# Patient Record
Sex: MALE | Race: ASIAN | NOT HISPANIC OR LATINO | URBAN - METROPOLITAN AREA
[De-identification: names, ages, dates, MRNs, and addresses within clinical notes are randomized per-mention and may not be internally consistent; named-entity substitution may affect disease eponyms.]

---

## 2017-04-09 ENCOUNTER — EMERGENCY (EMERGENCY)
Facility: HOSPITAL | Age: 67
LOS: 0 days | Discharge: HOME | End: 2017-04-09
Attending: EMERGENCY MEDICINE
Payer: MEDICAID

## 2017-04-09 VITALS
SYSTOLIC BLOOD PRESSURE: 164 MMHG | HEART RATE: 56 BPM | DIASTOLIC BLOOD PRESSURE: 76 MMHG | WEIGHT: 164.91 LBS | OXYGEN SATURATION: 98 % | TEMPERATURE: 99 F | RESPIRATION RATE: 17 BRPM | HEIGHT: 68 IN

## 2017-04-09 DIAGNOSIS — H00.14 CHALAZION LEFT UPPER EYELID: ICD-10-CM

## 2017-04-09 DIAGNOSIS — R60.9 EDEMA, UNSPECIFIED: ICD-10-CM

## 2017-04-09 DIAGNOSIS — E11.9 TYPE 2 DIABETES MELLITUS WITHOUT COMPLICATIONS: ICD-10-CM

## 2017-04-09 PROCEDURE — 99282 EMERGENCY DEPT VISIT SF MDM: CPT

## 2017-04-09 NOTE — ED PROVIDER NOTE - EYES, MLM
Clear bilaterally, pupils equal, round and reactive to light. left upper eyelid with swelling redness above lashes, otherwise no drainage actively.

## 2017-04-09 NOTE — ED PROVIDER NOTE - MEDICAL DECISION MAKING DETAILS
pt with romain noted to dc with Dr. Newell follow up for likely I&D and given warm compress instructions.

## 2017-04-09 NOTE — ED PROVIDER NOTE - OBJECTIVE STATEMENT
66 year old male with PMH of DM II presenting to ED due to left eyelid swelling x 1 week, no vision changes there is some pain on swollen area however and so came in for evaluation.

## 2017-05-15 ENCOUNTER — EMERGENCY (EMERGENCY)
Facility: HOSPITAL | Age: 67
LOS: 0 days | Discharge: HOME | End: 2017-05-15
Attending: EMERGENCY MEDICINE
Payer: MEDICAID

## 2017-05-15 VITALS
OXYGEN SATURATION: 98 % | HEIGHT: 68 IN | DIASTOLIC BLOOD PRESSURE: 74 MMHG | HEART RATE: 63 BPM | WEIGHT: 160.06 LBS | RESPIRATION RATE: 16 BRPM | TEMPERATURE: 97 F | SYSTOLIC BLOOD PRESSURE: 146 MMHG

## 2017-05-15 PROCEDURE — 71010: CPT | Mod: 26

## 2017-05-15 PROCEDURE — 99284 EMERGENCY DEPT VISIT MOD MDM: CPT

## 2017-05-15 RX ORDER — AZITHROMYCIN 500 MG/1
1 TABLET, FILM COATED ORAL
Qty: 4 | Refills: 0
Start: 2017-05-15 | End: 2017-05-19

## 2017-05-15 RX ORDER — IPRATROPIUM/ALBUTEROL SULFATE 18-103MCG
3 AEROSOL WITH ADAPTER (GRAM) INHALATION ONCE
Qty: 0 | Refills: 0 | Status: COMPLETED | OUTPATIENT
Start: 2017-05-15 | End: 2017-05-15

## 2017-05-15 RX ORDER — AZITHROMYCIN 500 MG/1
500 TABLET, FILM COATED ORAL ONCE
Qty: 0 | Refills: 0 | Status: COMPLETED | OUTPATIENT
Start: 2017-05-15 | End: 2017-05-15

## 2017-05-15 RX ORDER — ALBUTEROL 90 UG/1
2 AEROSOL, METERED ORAL
Qty: 1 | Refills: 0
Start: 2017-05-15 | End: 2017-06-14

## 2017-05-15 RX ADMIN — AZITHROMYCIN 500 MILLIGRAM(S): 500 TABLET, FILM COATED ORAL at 12:46

## 2017-05-15 RX ADMIN — Medication 3 MILLILITER(S): at 12:47

## 2017-05-15 NOTE — ED PROVIDER NOTE - OBJECTIVE STATEMENT
66 years old male walked in c/o productive coughs, nasal congestions , chest congestions for two weeks. Pt denies headache, dizziness, sob, chest pain, nausea, vomiting, fever, chills, abd pain.

## 2017-05-15 NOTE — ED ADULT NURSE NOTE - OBJECTIVE STATEMENT
x 2 weeks patient states he's had a cold in his chest, he has a cough with mucous and its not coming up. Patient states he cannot sleep well.

## 2017-05-15 NOTE — ED PROVIDER NOTE - PROGRESS NOTE DETAILS
Pt is alert and oriented x 3 smiling sts he is feeling and breathing much better now cxr no acute infil. Pt denies headache, dizziness, sob, chest pain, nausea, vomiting, fever, chills, abd pain.

## 2017-05-16 DIAGNOSIS — R09.81 NASAL CONGESTION: ICD-10-CM

## 2017-05-16 DIAGNOSIS — E11.9 TYPE 2 DIABETES MELLITUS WITHOUT COMPLICATIONS: ICD-10-CM

## 2017-05-16 DIAGNOSIS — R07.9 CHEST PAIN, UNSPECIFIED: ICD-10-CM

## 2017-05-16 DIAGNOSIS — R05 COUGH: ICD-10-CM

## 2017-05-16 DIAGNOSIS — J40 BRONCHITIS, NOT SPECIFIED AS ACUTE OR CHRONIC: ICD-10-CM

## 2018-03-04 ENCOUNTER — EMERGENCY (EMERGENCY)
Facility: HOSPITAL | Age: 68
LOS: 1 days | Discharge: HOME | End: 2018-03-04
Attending: EMERGENCY MEDICINE | Admitting: EMERGENCY MEDICINE
Payer: MEDICARE

## 2018-03-04 VITALS
RESPIRATION RATE: 20 BRPM | OXYGEN SATURATION: 100 % | HEART RATE: 53 BPM | DIASTOLIC BLOOD PRESSURE: 80 MMHG | SYSTOLIC BLOOD PRESSURE: 169 MMHG | TEMPERATURE: 98 F

## 2018-03-04 PROCEDURE — 99283 EMERGENCY DEPT VISIT LOW MDM: CPT

## 2018-03-04 RX ORDER — POLYMYXIN B SULF/TRIMETHOPRIM 10000-1/ML
2 DROPS OPHTHALMIC (EYE)
Qty: 10 | Refills: 0
Start: 2018-03-04 | End: 2018-03-08

## 2018-03-04 NOTE — ED PROVIDER NOTE - MEDICAL DECISION MAKING DETAILS
68 y/o male with Hx of NIDDM here for painful red eye x 2 days. Viral vs bacterial conjunctivitis. No urinary Sxs or any other Sxs to suggest gonorrhea. Does not wear contact lenses. Therefore will treat with antibiotic opthalmic drops and artifical tears for symptomatic relief. 66 y/o male with Hx of NIDDM here for painful red eye x 2 days. Viral vs bacterial conjunctivitis. No urinary Sxs or any other Sxs to suggest gonorrhea. Does not wear contact lenses. Therefore will treat with antibiotic opthalmic drops, and artifical tears for symptomatic relief.

## 2018-03-04 NOTE — ED PROVIDER NOTE - OBJECTIVE STATEMENT
66 y/o male, with PMHx of DM (on metformin and glipizide), presents to the ED for itchy, drainage, and redness to the left eye x yesterday. No eye contacts use. No known contact with individual with similar Sxs. No known trauma nor known FB entering the eye. No contact with house products. Pt only reports washing the drainage with warm tap water. Denies fever, chills, double vision, and any other complaints.

## 2018-03-04 NOTE — ED PROVIDER NOTE - EYE, LEFT
Injected conjunctiva on left with purulent drainage. Mild TTP. No edema nor erythema of the periorbital skin.

## 2018-03-04 NOTE — ED ADULT TRIAGE NOTE - CHIEF COMPLAINT QUOTE
Pt co left eye redness ,itchiness  and states eye was crusted this morning. Pt denies any visual problems

## 2019-06-11 ENCOUNTER — APPOINTMENT (OUTPATIENT)
Dept: OPHTHALMOLOGY | Facility: CLINIC | Age: 69
End: 2019-06-11
Payer: MEDICARE

## 2019-06-11 ENCOUNTER — NON-APPOINTMENT (OUTPATIENT)
Age: 69
End: 2019-06-11

## 2019-06-11 PROCEDURE — 92004 COMPRE OPH EXAM NEW PT 1/>: CPT

## 2019-06-11 PROCEDURE — 92285 EXTERNAL OCULAR PHOTOGRAPHY: CPT

## 2019-07-10 ENCOUNTER — APPOINTMENT (OUTPATIENT)
Dept: ENDOCRINOLOGY | Facility: CLINIC | Age: 69
End: 2019-07-10

## 2020-01-13 ENCOUNTER — APPOINTMENT (OUTPATIENT)
Dept: ENDOCRINOLOGY | Facility: CLINIC | Age: 70
End: 2020-01-13
Payer: MEDICARE

## 2020-01-13 VITALS
HEIGHT: 68 IN | WEIGHT: 158.25 LBS | SYSTOLIC BLOOD PRESSURE: 150 MMHG | DIASTOLIC BLOOD PRESSURE: 60 MMHG | BODY MASS INDEX: 23.98 KG/M2

## 2020-01-13 DIAGNOSIS — Z83.3 FAMILY HISTORY OF DIABETES MELLITUS: ICD-10-CM

## 2020-01-13 LAB — GLUCOSE BLDC GLUCOMTR-MCNC: 196

## 2020-01-13 PROCEDURE — 95251 CONT GLUC MNTR ANALYSIS I&R: CPT

## 2020-01-13 PROCEDURE — 95250 CONT GLUC MNTR PHYS/QHP EQP: CPT

## 2020-01-13 PROCEDURE — 99204 OFFICE O/P NEW MOD 45 MIN: CPT | Mod: 25

## 2020-01-13 PROCEDURE — 36415 COLL VENOUS BLD VENIPUNCTURE: CPT

## 2020-01-13 RX ORDER — LOSARTAN POTASSIUM 50 MG/1
50 TABLET, FILM COATED ORAL
Refills: 0 | Status: DISCONTINUED | COMMUNITY
End: 2020-01-13

## 2020-01-13 RX ORDER — GLIPIZIDE 10 MG/1
10 TABLET ORAL
Refills: 0 | Status: DISCONTINUED | COMMUNITY
End: 2020-01-13

## 2020-01-13 RX ORDER — REPAGLINIDE 1 MG/1
TABLET ORAL
Refills: 0 | Status: DISCONTINUED | COMMUNITY
End: 2020-01-13

## 2020-01-13 NOTE — ASSESSMENT
[Incretin Mimetic Therapy] : Risks and benefits of incretin mimetic therapy were discussed with the patient including nauseau, pancreatitis and potential risk of medullary thyroid cancer [Carbohydrate Consistent Diet] : carbohydrate consistent diet [Hypoglycemia Management] : hypoglycemia management [Glucagon Use] : glucagon use [Diabetes Foot Care] : diabetes foot care [Long Term Vascular Complications] : long term vascular complications of diabetes [Importance of Diet and Exercise] : importance of diet and exercise to improve glycemic control, achieve weight loss and improve cardiovascular health [Action and use of Insulin] : action and use of short and long-acting insulin [Self Monitoring of Blood Glucose] : self monitoring of blood glucose [Insulin Self-Administration] : insulin self-administration [Injection Technique, Storage, Sharps Disposal] : injection technique, storage, and sharps disposal [FreeTextEntry1] : Current approaches to diabetes management are discussed with the patient. \par Target ranges for blood sugar, blood pressure and cholesterol reviewed, and risk reduction strategies verified. \par Hypoglycemia precautions reviewed with the patient. \par Suggested extensive diabetes education program, including nutritional and diabetes teaching and evaluation. \par Proper dietary restrictions and exercise routines discussed. \par Glucometer/SMBG and log book charting discussed.\par - d/c prandin\par - Soliqua 15 un qd and uptitrate as directed. R+B\par - increase metformin 1000 mg bid and monitor renal functions\par - continue amaryl 4mg qd , increase actos 30mg qd for now\par - will eventually add Jardiance if GFR allows. Should benefit given his diabetic nephropathy\par - Christiano PRO\par - continue lipitor 20mg HS, cozaar 100mg, norvasc 5 mg\par RTC 2- 3 weeks for sensor download and CDE evaluation\par

## 2020-01-13 NOTE — HISTORY OF PRESENT ILLNESS
[FreeTextEntry1] : HISTORY OF PRESENT ILLNESS. \par \par Mr. BLAND was diagnosed with Diabetes Mellitus Type 2 in 2001 \par Reports history HTN, dyslipidemia, proteinuria/nephropathy. \par He denies CAD,  known complications of retinopathy or neuropathy. \par Presently on metformin 500 mg tid, amaryl 4mg qd, prandin 1mg tid, losartan 100mg, norvasc 5 mg, lipitor 20mg HS\par He stopped checking his blood sugars at home. \par Hypoglycemia frequency: occasional subjective hypoglycemia\par Fingerstick glucose in the office today is 196 mg/dL 2 hours after eating. \par Diet: not following ADA\par Exercise: \par \par Last dilated eye - 9/19\par Last podiatry visit  - many years ago\par Last cardiology evaluation - 8/19\par Last stress test - 8/19\par Last 2-D Echo 8/19\par Last nephrology evaluation - none\par Last neurology evaluation- none\par \par Lab review: a1c- 10.8 <-- 9.7 <--13.2; creatinine- 1.45 <-- 1.42, 25D- 13.6, macroalbuminuria, TSH- 2.67, LDL- 136\par \par

## 2020-01-13 NOTE — CONSULT LETTER
[Dear  ___] : Dear  [unfilled], [Sincerely,] : Sincerely, [FreeTextEntry1] : Thank you for referring  Mr. JIMMY BLAND to me for evaluation and treatment. Please, see attached consultation note. As always, if there are specific questions you would like to discuss, please feel free to contact me.\par Thank you for the courtesy of this evaluation.\par  [FreeTextEntry3] : Anthony Murphy MD, FACE, ECNU\par

## 2020-01-24 ENCOUNTER — APPOINTMENT (OUTPATIENT)
Dept: ENDOCRINOLOGY | Facility: CLINIC | Age: 70
End: 2020-01-24
Payer: MEDICARE

## 2020-01-24 PROCEDURE — G0108 DIAB MANAGE TRN  PER INDIV: CPT

## 2020-05-29 ENCOUNTER — APPOINTMENT (OUTPATIENT)
Dept: ENDOCRINOLOGY | Facility: CLINIC | Age: 70
End: 2020-05-29
Payer: MEDICARE

## 2020-05-29 VITALS
RESPIRATION RATE: 16 BRPM | OXYGEN SATURATION: 98 % | HEART RATE: 57 BPM | BODY MASS INDEX: 24.4 KG/M2 | DIASTOLIC BLOOD PRESSURE: 70 MMHG | HEIGHT: 68 IN | WEIGHT: 161 LBS | SYSTOLIC BLOOD PRESSURE: 160 MMHG

## 2020-05-29 LAB — GLUCOSE BLDC GLUCOMTR-MCNC: 224

## 2020-05-29 PROCEDURE — 36415 COLL VENOUS BLD VENIPUNCTURE: CPT

## 2020-05-29 PROCEDURE — 95250 CONT GLUC MNTR PHYS/QHP EQP: CPT

## 2020-05-29 PROCEDURE — 99215 OFFICE O/P EST HI 40 MIN: CPT | Mod: 25

## 2020-05-29 PROCEDURE — 95251 CONT GLUC MNTR ANALYSIS I&R: CPT

## 2020-05-29 RX ORDER — ATORVASTATIN CALCIUM 20 MG/1
20 TABLET, FILM COATED ORAL
Qty: 90 | Refills: 3 | Status: ACTIVE | COMMUNITY
Start: 1900-01-01 | End: 1900-01-01

## 2020-05-29 RX ORDER — METFORMIN HYDROCHLORIDE 1000 MG/1
1000 TABLET, COATED ORAL
Qty: 180 | Refills: 2 | Status: DISCONTINUED | COMMUNITY
End: 2020-05-29

## 2020-05-29 RX ORDER — INSULIN GLARGINE AND LIXISENATIDE 100; 33 U/ML; UG/ML
100-33 INJECTION, SOLUTION SUBCUTANEOUS
Qty: 3 | Refills: 6 | Status: DISCONTINUED | COMMUNITY
Start: 2020-01-13 | End: 2020-05-29

## 2020-05-29 NOTE — HISTORY OF PRESENT ILLNESS
[FreeTextEntry1] : F/u for diabetes management\par \par *** May 29, 2020 ***\par \par ran out of Soliqua about a month ago (not covered)  and his blood pressure medications\par diarrhea on metformin 1g bid\par taking actos 30 mg qd\par states that FS were in 90's when was taking Soliqua, now reports fbs and ppg in 180's- 200's\par \par HISTORY OF PRESENT ILLNESS. \par \par Mr. BLAND was diagnosed with Diabetes Mellitus Type 2 in 2001 \par Reports history HTN, dyslipidemia, proteinuria/nephropathy. \par He denies CAD,  known complications of retinopathy or neuropathy. \par Presently on metformin 500 mg tid, amaryl 4mg qd, prandin 1mg tid, losartan 100mg, norvasc 5 mg, lipitor 20mg HS\par He stopped checking his blood sugars at home. \par Hypoglycemia frequency: occasional subjective hypoglycemia\par Fingerstick glucose in the office today is 196 mg/dL 2 hours after eating. \par Diet: not following ADA\par Exercise: \par \par Last dilated eye - 9/19\par Last podiatry visit  - many years ago\par Last cardiology evaluation - 8/19\par Last stress test - 8/19\par Last 2-D Echo 8/19\par Last nephrology evaluation - none\par Last neurology evaluation- none\par \par Lab review: a1c- 10.8 <-- 9.7 <--13.2; creatinine- 1.45 <-- 1.42, 25D- 13.6, macroalbuminuria, TSH- 2.67, LDL- 136\par \par

## 2020-05-29 NOTE — ASSESSMENT
[Carbohydrate Consistent Diet] : carbohydrate consistent diet [Hypoglycemia Management] : hypoglycemia management [Glucagon Use] : glucagon use [Diabetes Foot Care] : diabetes foot care [Long Term Vascular Complications] : long term vascular complications of diabetes [Importance of Diet and Exercise] : importance of diet and exercise to improve glycemic control, achieve weight loss and improve cardiovascular health [Action and use of Insulin] : action and use of short and long-acting insulin [Self Monitoring of Blood Glucose] : self monitoring of blood glucose [Insulin Self-Administration] : insulin self-administration [Injection Technique, Storage, Sharps Disposal] : injection technique, storage, and sharps disposal [Incretin Mimetic Therapy] : Risks and benefits of incretin mimetic therapy were discussed with the patient including nauseau, pancreatitis and potential risk of medullary thyroid cancer [FreeTextEntry1] : - d/c Soliqua sine it's not covered\par - Basaglar 15 units HS\par - change metformin er 500mg 3 tablets with dinner and uptitrate if able\par - resume amaryl 4mg qd , actos 30mg qd for now\par - will eventually add Jardiance if GFR allows. Should benefit given his diabetic nephropathy\par - Christiano PRO\par - continue lipitor 20mg HS,  resume cozaar 100mg, norvasc 5 mg\par RTC 2- 3 weeks for sensor download and CDE f/u\par

## 2020-06-01 LAB
25(OH)D3 SERPL-MCNC: 15.1 NG/ML
ALBUMIN SERPL ELPH-MCNC: 4.4 G/DL
ALP BLD-CCNC: 96 U/L
ALT SERPL-CCNC: 18 U/L
ANION GAP SERPL CALC-SCNC: 12 MMOL/L
AST SERPL-CCNC: 15 U/L
BILIRUB SERPL-MCNC: 0.8 MG/DL
BUN SERPL-MCNC: 22 MG/DL
CALCIUM SERPL-MCNC: 9.9 MG/DL
CHLORIDE SERPL-SCNC: 104 MMOL/L
CHOLEST SERPL-MCNC: 184 MG/DL
CHOLEST/HDLC SERPL: 3 RATIO
CO2 SERPL-SCNC: 23 MMOL/L
CREAT SERPL-MCNC: 1.57 MG/DL
CREAT SPEC-SCNC: 205 MG/DL
ESTIMATED AVERAGE GLUCOSE: 260 MG/DL
FOLATE SERPL-MCNC: >20 NG/ML
FRUCTOSAMINE SERPL-MCNC: 424 UMOL/L
GLUCOSE SERPL-MCNC: 216 MG/DL
HBA1C MFR BLD HPLC: 10.7 %
HDLC SERPL-MCNC: 61 MG/DL
LDLC SERPL CALC-MCNC: 92 MG/DL
MICROALBUMIN 24H UR DL<=1MG/L-MCNC: 483.3 MG/DL
MICROALBUMIN/CREAT 24H UR-RTO: 2248 MG/G
POTASSIUM SERPL-SCNC: 5.3 MMOL/L
PROT SERPL-MCNC: 6.9 G/DL
SODIUM SERPL-SCNC: 139 MMOL/L
T4 FREE SERPL-MCNC: 1.4 NG/DL
TRIGL SERPL-MCNC: 153 MG/DL
TSH SERPL-ACNC: 2.25 UIU/ML
VIT B12 SERPL-MCNC: 395 PG/ML

## 2020-06-01 RX ORDER — ERGOCALCIFEROL 1.25 MG/1
1.25 MG CAPSULE ORAL
Qty: 13 | Refills: 1 | Status: ACTIVE | COMMUNITY
Start: 2020-06-01 | End: 1900-01-01

## 2020-06-12 ENCOUNTER — APPOINTMENT (OUTPATIENT)
Dept: ENDOCRINOLOGY | Facility: CLINIC | Age: 70
End: 2020-06-12
Payer: MEDICARE

## 2020-06-12 VITALS
DIASTOLIC BLOOD PRESSURE: 60 MMHG | RESPIRATION RATE: 16 BRPM | SYSTOLIC BLOOD PRESSURE: 124 MMHG | WEIGHT: 161 LBS | HEART RATE: 53 BPM | OXYGEN SATURATION: 99 % | HEIGHT: 68 IN | BODY MASS INDEX: 24.4 KG/M2

## 2020-06-12 DIAGNOSIS — M25.471 EFFUSION, RIGHT ANKLE: ICD-10-CM

## 2020-06-12 DIAGNOSIS — M25.474 EFFUSION, RIGHT ANKLE: ICD-10-CM

## 2020-06-12 DIAGNOSIS — M25.472 EFFUSION, RIGHT ANKLE: ICD-10-CM

## 2020-06-12 DIAGNOSIS — M25.475 EFFUSION, RIGHT ANKLE: ICD-10-CM

## 2020-06-12 LAB — GLUCOSE BLDC GLUCOMTR-MCNC: 161

## 2020-06-12 PROCEDURE — 99214 OFFICE O/P EST MOD 30 MIN: CPT | Mod: 25

## 2020-06-12 PROCEDURE — 82962 GLUCOSE BLOOD TEST: CPT

## 2020-06-12 RX ORDER — GLIMEPIRIDE 4 MG/1
4 TABLET ORAL
Qty: 90 | Refills: 3 | Status: DISCONTINUED | COMMUNITY
End: 2020-06-12

## 2020-06-12 NOTE — ASSESSMENT
[Hypoglycemia Management] : hypoglycemia management [Carbohydrate Consistent Diet] : carbohydrate consistent diet [Diabetes Foot Care] : diabetes foot care [Long Term Vascular Complications] : long term vascular complications of diabetes [Glucagon Use] : glucagon use [Self Monitoring of Blood Glucose] : self monitoring of blood glucose [Importance of Diet and Exercise] : importance of diet and exercise to improve glycemic control, achieve weight loss and improve cardiovascular health [Action and use of Insulin] : action and use of short and long-acting insulin [Insulin Self-Administration] : insulin self-administration [Injection Technique, Storage, Sharps Disposal] : injection technique, storage, and sharps disposal [Incretin Mimetic Therapy] : Risks and benefits of incretin mimetic therapy were discussed with the patient including nauseau, pancreatitis and potential risk of medullary thyroid cancer [FreeTextEntry1] : - Basaglar 14 units HS\par - increase metformin er 500mg 3 tablets with dinner and uptitrate if able\par - d/c amaryl\par - not sure if norvasc or actos contribute to leg edema\par - decr actos 15 mg and monitor for swelling\par - prandin 1-2-2\par - will eventually add Jardiance if GFR allows. Should benefit given his diabetic nephropathy\par - to see CDE\par - continue lipitor 20mg HS,  cozaar 100mg, norvasc 5 mg\par RTC 3 mos\par

## 2020-06-12 NOTE — HISTORY OF PRESENT ILLNESS
[FreeTextEntry1] : F/u for diabetes management\par \par *** Jun 12, 2020 ***\par \par taking Basaglar 14 units HS, metformin er 500mg 2 tablets with dinner ,  amaryl 4mg qd , actos 30mg qd, drisdol 50K qw\par feels much better. noticed some ankle swelling\par tolerates metformin very well\par reports fbs in 80's- low 90's, not checking ppg\par \par Date of download:  6/12/20\par Diabetes Medications and Dosage: as above\par Indication for CGMS: verify a change in the treatment regimen, identify periods of hypoglycemia/ hyperglycemia. \par Modal day report: pattern. \par Pt with HYPO 4 % of the time (5% below 54),  52% in target range\par Hyperglycemia:  39% elevation \par Identified issues: carbohydrate counting, spiking post lunch and dinner\par dates analyzed: 5/29/20-6/12/20\par \par \par *** May 29, 2020 ***\par \par ran out of Soliqua about a month ago (not covered)  and his blood pressure medications\par diarrhea on metformin 1g bid\par taking actos 30 mg qd\par states that FS were in 90's when was taking Soliqua, now reports fbs and ppg in 180's- 200's\par \par HISTORY OF PRESENT ILLNESS. \par \par Mr. BLAND was diagnosed with Diabetes Mellitus Type 2 in 2001 \par Reports history HTN, dyslipidemia, proteinuria/nephropathy. \par He denies CAD,  known complications of retinopathy or neuropathy. \par Presently on metformin 500 mg tid, amaryl 4mg qd, prandin 1mg tid, losartan 100mg, norvasc 5 mg, lipitor 20mg HS\par He stopped checking his blood sugars at home. \par Hypoglycemia frequency: occasional subjective hypoglycemia\par Fingerstick glucose in the office today is 196 mg/dL 2 hours after eating. \par Diet: not following ADA\par Exercise: \par \par Last dilated eye - 9/19\par Last podiatry visit  - many years ago\par Last cardiology evaluation - 8/19\par Last stress test - 8/19\par Last 2-D Echo 8/19\par Last nephrology evaluation - none\par Last neurology evaluation- none\par \par Lab review: a1c- 10.8 <-- 9.7 <--13.2; creatinine- 1.45 <-- 1.42, 25D- 13.6, macroalbuminuria, TSH- 2.67, LDL- 136\par \par

## 2020-06-22 ENCOUNTER — APPOINTMENT (OUTPATIENT)
Dept: ENDOCRINOLOGY | Facility: CLINIC | Age: 70
End: 2020-06-22
Payer: MEDICARE

## 2020-06-22 PROCEDURE — G0108 DIAB MANAGE TRN  PER INDIV: CPT

## 2020-09-09 ENCOUNTER — APPOINTMENT (OUTPATIENT)
Dept: ENDOCRINOLOGY | Facility: CLINIC | Age: 70
End: 2020-09-09
Payer: MEDICARE

## 2020-09-09 VITALS
HEIGHT: 68 IN | SYSTOLIC BLOOD PRESSURE: 161 MMHG | TEMPERATURE: 98.1 F | OXYGEN SATURATION: 99 % | RESPIRATION RATE: 16 BRPM | DIASTOLIC BLOOD PRESSURE: 70 MMHG | WEIGHT: 159 LBS | BODY MASS INDEX: 24.1 KG/M2 | HEART RATE: 53 BPM

## 2020-09-09 LAB — GLUCOSE BLDC GLUCOMTR-MCNC: 317

## 2020-09-09 PROCEDURE — 36415 COLL VENOUS BLD VENIPUNCTURE: CPT

## 2020-09-09 PROCEDURE — 99214 OFFICE O/P EST MOD 30 MIN: CPT | Mod: 25

## 2020-09-09 NOTE — HISTORY OF PRESENT ILLNESS
[FreeTextEntry1] : F/u for diabetes management\par \par *** Sep 09, 2020 ***\par \par unable to afford any basal insulin\par takes ReliOn 8 un in am, 7 un in pm,  metformin er 500mg 2 tablets with dinner ,  actos 15 mg,   drisdol 50K qw,  lipitor 20mg HS,  cozaar 100mg, norvasc 5 mg\par swelling resolved with decreasing actos\par did not start prandin yet\par reports fbs- 115's, not checking ppg\par stopped insulin few days ago for unclear reason. today ppg in the office- 317\par \par *** Jun 12, 2020 ***\par \par taking Basaglar 14 units HS, metformin er 500mg 2 tablets with dinner ,  amaryl 4mg qd , actos 30mg qd, drisdol 50K qw\par feels much better. noticed some ankle swelling\par tolerates metformin very well\par reports fbs in 80's- low 90's, not checking ppg\par \par Date of download:  6/12/20\par Diabetes Medications and Dosage: as above\par Indication for CGMS: verify a change in the treatment regimen, identify periods of hypoglycemia/ hyperglycemia. \par Modal day report: pattern. \par Pt with HYPO 4 % of the time (5% below 54),  52% in target range\par Hyperglycemia:  39% elevation \par Identified issues: carbohydrate counting, spiking post lunch and dinner\par dates analyzed: 5/29/20-6/12/20\par \par \par *** May 29, 2020 ***\par \par ran out of Soliqua about a month ago (not covered)  and his blood pressure medications\par diarrhea on metformin 1g bid\par taking actos 30 mg qd\par states that FS were in 90's when was taking Soliqua, now reports fbs and ppg in 180's- 200's\par \par HISTORY OF PRESENT ILLNESS. \par \par Mr. BLAND was diagnosed with Diabetes Mellitus Type 2 in 2001 \par Reports history HTN, dyslipidemia, proteinuria/nephropathy. \par He denies CAD,  known complications of retinopathy or neuropathy. \par Presently on metformin 500 mg tid, amaryl 4mg qd, prandin 1mg tid, losartan 100mg, norvasc 5 mg, lipitor 20mg HS\par He stopped checking his blood sugars at home. \par Hypoglycemia frequency: occasional subjective hypoglycemia\par Fingerstick glucose in the office today is 196 mg/dL 2 hours after eating. \par Diet: not following ADA\par Exercise: \par \par Last dilated eye - 9/19\par Last podiatry visit  - many years ago\par Last cardiology evaluation - 8/19\par Last stress test - 8/19\par Last 2-D Echo 8/19\par Last nephrology evaluation - none\par Last neurology evaluation- none\par \par Lab review: a1c- 10.8 <-- 9.7 <--13.2; creatinine- 1.45 <-- 1.42, 25D- 13.6, macroalbuminuria, TSH- 2.67, LDL- 136\par \par

## 2020-09-09 NOTE — ASSESSMENT
[Carbohydrate Consistent Diet] : carbohydrate consistent diet [Hypoglycemia Management] : hypoglycemia management [Diabetes Foot Care] : diabetes foot care [Glucagon Use] : glucagon use [Long Term Vascular Complications] : long term vascular complications of diabetes [Importance of Diet and Exercise] : importance of diet and exercise to improve glycemic control, achieve weight loss and improve cardiovascular health [Action and use of Insulin] : action and use of short and long-acting insulin [Self Monitoring of Blood Glucose] : self monitoring of blood glucose [Insulin Self-Administration] : insulin self-administration [Incretin Mimetic Therapy] : Risks and benefits of incretin mimetic therapy were discussed with the patient including nauseau, pancreatitis and potential risk of medullary thyroid cancer [Injection Technique, Storage, Sharps Disposal] : injection technique, storage, and sharps disposal [FreeTextEntry1] : - advised on meds compliance and danger of diabetic complications\par - I've also advised on possibility discharging from the practice due to non-compliance issues\par - resume Relion 8-7\par - metformin er 500mg 3 tablets with dinner and uptitrate if able\par - actos 15 mg and monitor for swelling. Pt is also on norvasc\par - start  prandin 1-2-2\par - will eventually add Jardiance if GFR allows. Should benefit given his diabetic nephropathy\par - to see CDE\par - continue lipitor 20mg HS,  cozaar 100mg, norvasc 5 mg\par RTC 3 mos\par

## 2020-09-11 LAB
ALBUMIN SERPL ELPH-MCNC: 4.2 G/DL
ALP BLD-CCNC: 95 U/L
ALT SERPL-CCNC: 10 U/L
ANION GAP SERPL CALC-SCNC: 14 MMOL/L
AST SERPL-CCNC: 12 U/L
BILIRUB SERPL-MCNC: 0.6 MG/DL
BUN SERPL-MCNC: 27 MG/DL
CALCIUM SERPL-MCNC: 9.4 MG/DL
CHLORIDE SERPL-SCNC: 100 MMOL/L
CHOLEST SERPL-MCNC: 186 MG/DL
CHOLEST/HDLC SERPL: 4.4 RATIO
CO2 SERPL-SCNC: 21 MMOL/L
CREAT SERPL-MCNC: 1.66 MG/DL
ESTIMATED AVERAGE GLUCOSE: 249 MG/DL
FOLATE SERPL-MCNC: >20 NG/ML
FRUCTOSAMINE SERPL-MCNC: 380 UMOL/L
GLUCOSE SERPL-MCNC: 306 MG/DL
HBA1C MFR BLD HPLC: 10.3 %
HDLC SERPL-MCNC: 42 MG/DL
LDLC SERPL CALC-MCNC: 84 MG/DL
POTASSIUM SERPL-SCNC: 6.1 MMOL/L
PROT SERPL-MCNC: 6.9 G/DL
SODIUM SERPL-SCNC: 135 MMOL/L
TRIGL SERPL-MCNC: 300 MG/DL
TSH SERPL-ACNC: 2.13 UIU/ML
VIT B12 SERPL-MCNC: 389 PG/ML

## 2020-09-14 ENCOUNTER — APPOINTMENT (OUTPATIENT)
Dept: ENDOCRINOLOGY | Facility: CLINIC | Age: 70
End: 2020-09-14
Payer: MEDICARE

## 2020-09-14 VITALS
SYSTOLIC BLOOD PRESSURE: 124 MMHG | BODY MASS INDEX: 24.1 KG/M2 | HEART RATE: 46 BPM | HEIGHT: 68 IN | DIASTOLIC BLOOD PRESSURE: 70 MMHG | RESPIRATION RATE: 16 BRPM | WEIGHT: 159 LBS | TEMPERATURE: 98.1 F | OXYGEN SATURATION: 98 %

## 2020-09-14 LAB
ANION GAP SERPL CALC-SCNC: 12 MMOL/L
BUN SERPL-MCNC: 25 MG/DL
CALCIUM SERPL-MCNC: 9.3 MG/DL
CHLORIDE SERPL-SCNC: 105 MMOL/L
CO2 SERPL-SCNC: 21 MMOL/L
CREAT SERPL-MCNC: 1.62 MG/DL
GLUCOSE BLDC GLUCOMTR-MCNC: 150
GLUCOSE SERPL-MCNC: 156 MG/DL
POTASSIUM SERPL-SCNC: 5.4 MMOL/L
POTASSIUM SERPL-SCNC: 5.7 MMOL/L
SODIUM SERPL-SCNC: 138 MMOL/L

## 2020-09-14 PROCEDURE — 99214 OFFICE O/P EST MOD 30 MIN: CPT

## 2020-09-14 PROCEDURE — 95250 CONT GLUC MNTR PHYS/QHP EQP: CPT

## 2020-09-14 PROCEDURE — 95251 CONT GLUC MNTR ANALYSIS I&R: CPT

## 2020-09-28 ENCOUNTER — APPOINTMENT (OUTPATIENT)
Dept: ENDOCRINOLOGY | Facility: CLINIC | Age: 70
End: 2020-09-28
Payer: MEDICARE

## 2020-09-28 PROCEDURE — G0108 DIAB MANAGE TRN  PER INDIV: CPT

## 2020-10-12 ENCOUNTER — APPOINTMENT (OUTPATIENT)
Dept: ENDOCRINOLOGY | Facility: CLINIC | Age: 70
End: 2020-10-12
Payer: MEDICARE

## 2020-10-12 PROCEDURE — G0108 DIAB MANAGE TRN  PER INDIV: CPT

## 2020-11-24 NOTE — ASSESSMENT
[Glucagon Use] : glucagon use [Carbohydrate Consistent Diet] : carbohydrate consistent diet [Hypoglycemia Management] : hypoglycemia management [Long Term Vascular Complications] : long term vascular complications of diabetes [Importance of Diet and Exercise] : importance of diet and exercise to improve glycemic control, achieve weight loss and improve cardiovascular health [Diabetes Foot Care] : diabetes foot care [Action and use of Insulin] : action and use of short and long-acting insulin [Self Monitoring of Blood Glucose] : self monitoring of blood glucose [Insulin Self-Administration] : insulin self-administration [Injection Technique, Storage, Sharps Disposal] : injection technique, storage, and sharps disposal [Incretin Mimetic Therapy] : Risks and benefits of incretin mimetic therapy were discussed with the patient including nauseau, pancreatitis and potential risk of medullary thyroid cancer [FreeTextEntry1] : - advised on meds compliance and danger of diabetic complications\par - I've also advised on possibility discharging from the practice due to non-compliance issues\par - Relion 10-10\par - metformin er 500mg 3 tablets with dinner and uptitrate if able\par - actos 15 mg and monitor for swelling. Pt is also on norvasc\par - resume prandin 1-2-2\par - no SGLT2 given low GFR. F/u with renal!\par - to see CDE\par - continue lipitor 20mg HS,  cozaar 100mg, norvasc 5 mg\par - antony PRO\par - follow up plasma K+ results. low K+ diet. might have to stop cozaar\par RTC 3 mos\par

## 2020-11-24 NOTE — ADDENDUM
[FreeTextEntry1] : 11/23/2020 \par \par Christiano 2 reader was provided to the patient by CDE\par \par \par 11/24/2020 \par \par Update:\par Christiano 2 reader was provided to the patient by CDE at no cost\par

## 2020-11-24 NOTE — HISTORY OF PRESENT ILLNESS
[FreeTextEntry1] : F/u for diabetes management\par \par *** Sep 14, 2020 ***\par \par resumed Relion 8-7,  metformin er 500mg 3 tablets with dinner,  actos 15 mg,  prandin 1-2-2\par had labs done this am for potassium recheck- still pending results\par states that his sugars are better since changing the regimen\par \par *** Sep 09, 2020 ***\par \par unable to afford any basal insulin\par takes ReliOn 8 un in am, 7 un in pm,  metformin er 500mg 2 tablets with dinner ,  actos 15 mg,   drisdol 50K qw,  lipitor 20mg HS,  cozaar 100mg, norvasc 5 mg\par swelling resolved with decreasing actos\par did not start prandin yet\par reports fbs- 115's, not checking ppg\par stopped insulin few days ago for unclear reason. today ppg in the office- 317\par \par *** Jun 12, 2020 ***\par \par taking Basaglar 14 units HS, metformin er 500mg 2 tablets with dinner ,  amaryl 4mg qd , actos 30mg qd, drisdol 50K qw\par feels much better. noticed some ankle swelling\par tolerates metformin very well\par reports fbs in 80's- low 90's, not checking ppg\par \par Date of download:  6/12/20\par Diabetes Medications and Dosage: as above\par Indication for CGMS: verify a change in the treatment regimen, identify periods of hypoglycemia/ hyperglycemia. \par Modal day report: pattern. \par Pt with HYPO 4 % of the time (5% below 54),  52% in target range\par Hyperglycemia:  39% elevation \par Identified issues: carbohydrate counting, spiking post lunch and dinner\par dates analyzed: 5/29/20-6/12/20\par \par \par *** May 29, 2020 ***\par \par ran out of Soliqua about a month ago (not covered)  and his blood pressure medications\par diarrhea on metformin 1g bid\par taking actos 30 mg qd\par states that FS were in 90's when was taking Soliqua, now reports fbs and ppg in 180's- 200's\par \par HISTORY OF PRESENT ILLNESS. \par \par Mr. BLAND was diagnosed with Diabetes Mellitus Type 2 in 2001 \par Reports history HTN, dyslipidemia, proteinuria/nephropathy. \par He denies CAD,  known complications of retinopathy or neuropathy. \par Presently on metformin 500 mg tid, amaryl 4mg qd, prandin 1mg tid, losartan 100mg, norvasc 5 mg, lipitor 20mg HS\par He stopped checking his blood sugars at home. \par Hypoglycemia frequency: occasional subjective hypoglycemia\par Fingerstick glucose in the office today is 196 mg/dL 2 hours after eating. \par Diet: not following ADA\par Exercise: \par \par Last dilated eye - 9/19\par Last podiatry visit  - many years ago\par Last cardiology evaluation - 8/19\par Last stress test - 8/19\par Last 2-D Echo 8/19\par Last nephrology evaluation - 2019\par Last neurology evaluation- none\par \par Lab review: a1c- 10.8 <-- 9.7 <--13.2; creatinine- 1.45 <-- 1.42, 25D- 13.6, macroalbuminuria, TSH- 2.67, LDL- 136\par \par

## 2020-12-09 ENCOUNTER — APPOINTMENT (OUTPATIENT)
Dept: ENDOCRINOLOGY | Facility: CLINIC | Age: 70
End: 2020-12-09

## 2020-12-14 ENCOUNTER — APPOINTMENT (OUTPATIENT)
Dept: ENDOCRINOLOGY | Facility: CLINIC | Age: 70
End: 2020-12-14
Payer: MEDICARE

## 2020-12-14 VITALS
WEIGHT: 156 LBS | SYSTOLIC BLOOD PRESSURE: 145 MMHG | TEMPERATURE: 98.3 F | HEIGHT: 68 IN | HEART RATE: 56 BPM | RESPIRATION RATE: 16 BRPM | DIASTOLIC BLOOD PRESSURE: 70 MMHG | BODY MASS INDEX: 23.64 KG/M2 | OXYGEN SATURATION: 97 %

## 2020-12-14 LAB
FRUCTOSAMINE SERPL-MCNC: 414 UMOL/L
GLUCOSE BLDC GLUCOMTR-MCNC: 88

## 2020-12-14 PROCEDURE — 99214 OFFICE O/P EST MOD 30 MIN: CPT | Mod: 25

## 2020-12-14 PROCEDURE — 36415 COLL VENOUS BLD VENIPUNCTURE: CPT

## 2020-12-14 NOTE — ASSESSMENT
[Carbohydrate Consistent Diet] : carbohydrate consistent diet [Hypoglycemia Management] : hypoglycemia management [Glucagon Use] : glucagon use [Diabetes Foot Care] : diabetes foot care [Long Term Vascular Complications] : long term vascular complications of diabetes [Importance of Diet and Exercise] : importance of diet and exercise to improve glycemic control, achieve weight loss and improve cardiovascular health [Action and use of Insulin] : action and use of short and long-acting insulin [Self Monitoring of Blood Glucose] : self monitoring of blood glucose [Insulin Self-Administration] : insulin self-administration [Injection Technique, Storage, Sharps Disposal] : injection technique, storage, and sharps disposal [Incretin Mimetic Therapy] : Risks and benefits of incretin mimetic therapy were discussed with the patient including nauseau, pancreatitis and potential risk of medullary thyroid cancer [FreeTextEntry1] : - advised on meds compliance and danger of diabetic complications\par - I've also advised on possibility discharging from the practice due to non-compliance issues\par - Relion 9-10\par - metformin er 500mg 3 tablets with dinner and uptitrate if able\par - actos 15 mg and monitor for swelling. Pt is also on norvasc\par - prandin 1-2-2\par - no SGLT2 given low GFR. F/u with renal!\par - to see CDE\par - continue lipitor 20mg HS,  cozaar 100mg, norvasc 5 mg\par - antony 2 teaching provided\par - follow up plasma K+ results. low K+ diet. might have to stop cozaar\par RTC 3 mos\par

## 2020-12-14 NOTE — HISTORY OF PRESENT ILLNESS
[FreeTextEntry1] : F/u for diabetes management\par \par *** Dec 14, 2020 ***\par \par received Christiano 2- teaching today\par takes Relion 9-10,  metformin er 500mg 3 tablets with dinner ,  actos 15 mg, prandin 1-2-2\par no more leg swelling\par reports fbs- 88- upto 130, ppg-  low 100's\par \par *** Sep 14, 2020 ***\par \par resumed Relion 8-7,  metformin er 500mg 3 tablets with dinner,  actos 15 mg,  prandin 1-2-2\par had labs done this am for potassium recheck- still pending results\par states that his sugars are better since changing the regimen\par \par *** Sep 09, 2020 ***\par \par unable to afford any basal insulin\par takes ReliOn 8 un in am, 7 un in pm,  metformin er 500mg 2 tablets with dinner ,  actos 15 mg,   drisdol 50K qw,  lipitor 20mg HS,  cozaar 100mg, norvasc 5 mg\par swelling resolved with decreasing actos\par did not start prandin yet\par reports fbs- 115's, not checking ppg\par stopped insulin few days ago for unclear reason. today ppg in the office- 317\par \par *** Jun 12, 2020 ***\par \par taking Basaglar 14 units HS, metformin er 500mg 2 tablets with dinner ,  amaryl 4mg qd , actos 30mg qd, drisdol 50K qw\par feels much better. noticed some ankle swelling\par tolerates metformin very well\par reports fbs in 80's- low 90's, not checking ppg\par \par Date of download:  6/12/20\par Diabetes Medications and Dosage: as above\par Indication for CGMS: verify a change in the treatment regimen, identify periods of hypoglycemia/ hyperglycemia. \par Modal day report: pattern. \par Pt with HYPO 4 % of the time (5% below 54),  52% in target range\par Hyperglycemia:  39% elevation \par Identified issues: carbohydrate counting, spiking post lunch and dinner\par dates analyzed: 5/29/20-6/12/20\par \par \par *** May 29, 2020 ***\par \par ran out of Soliqua about a month ago (not covered)  and his blood pressure medications\par diarrhea on metformin 1g bid\par taking actos 30 mg qd\par states that FS were in 90's when was taking Soliqua, now reports fbs and ppg in 180's- 200's\par \par HISTORY OF PRESENT ILLNESS. \par \par Mr. BLAND was diagnosed with Diabetes Mellitus Type 2 in 2001 \par Reports history HTN, dyslipidemia, proteinuria/nephropathy. \par He denies CAD,  known complications of retinopathy or neuropathy. \par Presently on metformin 500 mg tid, amaryl 4mg qd, prandin 1mg tid, losartan 100mg, norvasc 5 mg, lipitor 20mg HS\par He stopped checking his blood sugars at home. \par Hypoglycemia frequency: occasional subjective hypoglycemia\par Fingerstick glucose in the office today is 196 mg/dL 2 hours after eating. \par Diet: not following ADA\par Exercise: \par \par Last dilated eye - 9/19\par Last podiatry visit  - many years ago\par Last cardiology evaluation - 8/19\par Last stress test - 8/19\par Last 2-D Echo 8/19\par Last nephrology evaluation - 2019\par Last neurology evaluation- none\par \par Lab review: a1c- 10.8 <-- 9.7 <--13.2; creatinine- 1.45 <-- 1.42, 25D- 13.6, macroalbuminuria, TSH- 2.67, LDL- 136\par \par

## 2020-12-15 ENCOUNTER — APPOINTMENT (OUTPATIENT)
Dept: ENDOCRINOLOGY | Facility: CLINIC | Age: 70
End: 2020-12-15
Payer: MEDICARE

## 2020-12-15 LAB
25(OH)D3 SERPL-MCNC: 26.3 NG/ML
ALBUMIN SERPL ELPH-MCNC: 4.1 G/DL
ALP BLD-CCNC: 92 U/L
ALT SERPL-CCNC: 7 U/L
ANION GAP SERPL CALC-SCNC: 12 MMOL/L
AST SERPL-CCNC: 12 U/L
BILIRUB SERPL-MCNC: 0.7 MG/DL
BUN SERPL-MCNC: 21 MG/DL
CALCIUM SERPL-MCNC: 9.8 MG/DL
CHLORIDE SERPL-SCNC: 105 MMOL/L
CHOLEST SERPL-MCNC: 224 MG/DL
CO2 SERPL-SCNC: 23 MMOL/L
CREAT SERPL-MCNC: 1.89 MG/DL
CREAT SPEC-SCNC: 194 MG/DL
ESTIMATED AVERAGE GLUCOSE: 309 MG/DL
FOLATE SERPL-MCNC: >20 NG/ML
GLUCOSE SERPL-MCNC: 83 MG/DL
HBA1C MFR BLD HPLC: 12.4 %
HDLC SERPL-MCNC: 60 MG/DL
LDLC SERPL CALC-MCNC: 138 MG/DL
MICROALBUMIN 24H UR DL<=1MG/L-MCNC: 580.1 MG/DL
MICROALBUMIN/CREAT 24H UR-RTO: 2996 MG/G
NONHDLC SERPL-MCNC: 164 MG/DL
POTASSIUM SERPL-SCNC: 5.9 MMOL/L
POTASSIUM SERPL-SCNC: 6.3 MMOL/L
PROT SERPL-MCNC: 6.9 G/DL
SODIUM SERPL-SCNC: 140 MMOL/L
T4 FREE SERPL-MCNC: 1.2 NG/DL
TRIGL SERPL-MCNC: 130 MG/DL
TSH SERPL-ACNC: 2.6 UIU/ML
VIT B12 SERPL-MCNC: 373 PG/ML

## 2020-12-15 PROCEDURE — 99442: CPT | Mod: 95

## 2020-12-15 RX ORDER — INSULIN LISPRO 100 [IU]/ML
100 INJECTION, SOLUTION INTRAVENOUS; SUBCUTANEOUS
Qty: 2 | Refills: 6 | Status: ACTIVE | COMMUNITY
Start: 2020-12-15 | End: 1900-01-01

## 2020-12-15 RX ORDER — REPAGLINIDE 1 MG/1
1 TABLET ORAL
Qty: 150 | Refills: 11 | Status: DISCONTINUED | COMMUNITY
Start: 2020-06-12 | End: 2020-12-15

## 2020-12-15 RX ORDER — LOSARTAN POTASSIUM 100 MG/1
100 TABLET, FILM COATED ORAL
Qty: 90 | Refills: 3 | Status: DISCONTINUED | COMMUNITY
End: 2020-12-15

## 2021-01-18 ENCOUNTER — RX RENEWAL (OUTPATIENT)
Age: 71
End: 2021-01-18

## 2021-02-09 ENCOUNTER — APPOINTMENT (OUTPATIENT)
Dept: OPHTHALMOLOGY | Facility: CLINIC | Age: 71
End: 2021-02-09
Payer: MEDICARE

## 2021-02-09 ENCOUNTER — NON-APPOINTMENT (OUTPATIENT)
Age: 71
End: 2021-02-09

## 2021-02-09 PROCEDURE — 99072 ADDL SUPL MATRL&STAF TM PHE: CPT

## 2021-02-09 PROCEDURE — 92014 COMPRE OPH EXAM EST PT 1/>: CPT

## 2021-02-09 PROCEDURE — 92201 OPSCPY EXTND RTA DRAW UNI/BI: CPT

## 2021-03-22 ENCOUNTER — APPOINTMENT (OUTPATIENT)
Dept: ENDOCRINOLOGY | Facility: CLINIC | Age: 71
End: 2021-03-22

## 2021-05-04 ENCOUNTER — EMERGENCY (EMERGENCY)
Facility: HOSPITAL | Age: 71
LOS: 1 days | Discharge: HOME | End: 2021-05-04
Attending: STUDENT IN AN ORGANIZED HEALTH CARE EDUCATION/TRAINING PROGRAM | Admitting: STUDENT IN AN ORGANIZED HEALTH CARE EDUCATION/TRAINING PROGRAM
Payer: MEDICARE

## 2021-05-04 VITALS
HEART RATE: 54 BPM | OXYGEN SATURATION: 100 % | TEMPERATURE: 98 F | RESPIRATION RATE: 18 BRPM | SYSTOLIC BLOOD PRESSURE: 196 MMHG | DIASTOLIC BLOOD PRESSURE: 84 MMHG

## 2021-05-04 VITALS
DIASTOLIC BLOOD PRESSURE: 83 MMHG | HEIGHT: 68 IN | SYSTOLIC BLOOD PRESSURE: 200 MMHG | RESPIRATION RATE: 18 BRPM | TEMPERATURE: 98 F | OXYGEN SATURATION: 98 % | HEART RATE: 68 BPM

## 2021-05-04 LAB
ALBUMIN SERPL ELPH-MCNC: 3.8 G/DL — SIGNIFICANT CHANGE UP (ref 3.3–5)
ALP SERPL-CCNC: 115 U/L — SIGNIFICANT CHANGE UP (ref 40–120)
ALT FLD-CCNC: 13 U/L — SIGNIFICANT CHANGE UP (ref 4–41)
ANION GAP SERPL CALC-SCNC: 13 MMOL/L — SIGNIFICANT CHANGE UP (ref 7–14)
APPEARANCE UR: CLEAR — SIGNIFICANT CHANGE UP
AST SERPL-CCNC: 15 U/L — SIGNIFICANT CHANGE UP (ref 4–40)
B-OH-BUTYR SERPL-SCNC: 0.5 MMOL/L — HIGH (ref 0–0.4)
BACTERIA # UR AUTO: NEGATIVE — SIGNIFICANT CHANGE UP
BASOPHILS # BLD AUTO: 0.05 K/UL — SIGNIFICANT CHANGE UP (ref 0–0.2)
BASOPHILS NFR BLD AUTO: 0.6 % — SIGNIFICANT CHANGE UP (ref 0–2)
BILIRUB SERPL-MCNC: 0.6 MG/DL — SIGNIFICANT CHANGE UP (ref 0.2–1.2)
BILIRUB UR-MCNC: NEGATIVE — SIGNIFICANT CHANGE UP
BLOOD GAS VENOUS COMPREHENSIVE RESULT: SIGNIFICANT CHANGE UP
BUN SERPL-MCNC: 34 MG/DL — HIGH (ref 7–23)
CALCIUM SERPL-MCNC: 9.2 MG/DL — SIGNIFICANT CHANGE UP (ref 8.4–10.5)
CHLORIDE SERPL-SCNC: 100 MMOL/L — SIGNIFICANT CHANGE UP (ref 98–107)
CO2 SERPL-SCNC: 21 MMOL/L — LOW (ref 22–31)
COLOR SPEC: YELLOW — SIGNIFICANT CHANGE UP
CREAT SERPL-MCNC: 2.21 MG/DL — HIGH (ref 0.5–1.3)
DIFF PNL FLD: ABNORMAL
EOSINOPHIL # BLD AUTO: 0.41 K/UL — SIGNIFICANT CHANGE UP (ref 0–0.5)
EOSINOPHIL NFR BLD AUTO: 5.2 % — SIGNIFICANT CHANGE UP (ref 0–6)
EPI CELLS # UR: 2 /HPF — SIGNIFICANT CHANGE UP (ref 0–5)
GLUCOSE SERPL-MCNC: 204 MG/DL — HIGH (ref 70–99)
GLUCOSE UR QL: ABNORMAL
HCT VFR BLD CALC: 33.9 % — LOW (ref 39–50)
HGB BLD-MCNC: 10.4 G/DL — LOW (ref 13–17)
HYALINE CASTS # UR AUTO: 8 /LPF — HIGH (ref 0–7)
IANC: 4.62 K/UL — SIGNIFICANT CHANGE UP (ref 1.5–8.5)
IMM GRANULOCYTES NFR BLD AUTO: 0.3 % — SIGNIFICANT CHANGE UP (ref 0–1.5)
KETONES UR-MCNC: NEGATIVE — SIGNIFICANT CHANGE UP
LEUKOCYTE ESTERASE UR-ACNC: NEGATIVE — SIGNIFICANT CHANGE UP
LYMPHOCYTES # BLD AUTO: 2.17 K/UL — SIGNIFICANT CHANGE UP (ref 1–3.3)
LYMPHOCYTES # BLD AUTO: 27.7 % — SIGNIFICANT CHANGE UP (ref 13–44)
MCHC RBC-ENTMCNC: 19.8 PG — LOW (ref 27–34)
MCHC RBC-ENTMCNC: 30.7 GM/DL — LOW (ref 32–36)
MCV RBC AUTO: 64.4 FL — LOW (ref 80–100)
MONOCYTES # BLD AUTO: 0.56 K/UL — SIGNIFICANT CHANGE UP (ref 0–0.9)
MONOCYTES NFR BLD AUTO: 7.2 % — SIGNIFICANT CHANGE UP (ref 2–14)
NEUTROPHILS # BLD AUTO: 4.62 K/UL — SIGNIFICANT CHANGE UP (ref 1.8–7.4)
NEUTROPHILS NFR BLD AUTO: 59 % — SIGNIFICANT CHANGE UP (ref 43–77)
NITRITE UR-MCNC: NEGATIVE — SIGNIFICANT CHANGE UP
NRBC # BLD: 0 /100 WBCS — SIGNIFICANT CHANGE UP
NRBC # FLD: 0 K/UL — SIGNIFICANT CHANGE UP
PH UR: 6.5 — SIGNIFICANT CHANGE UP (ref 5–8)
PLATELET # BLD AUTO: 207 K/UL — SIGNIFICANT CHANGE UP (ref 150–400)
POTASSIUM SERPL-MCNC: 4.6 MMOL/L — SIGNIFICANT CHANGE UP (ref 3.5–5.3)
POTASSIUM SERPL-SCNC: 4.6 MMOL/L — SIGNIFICANT CHANGE UP (ref 3.5–5.3)
PROT SERPL-MCNC: 7.2 G/DL — SIGNIFICANT CHANGE UP (ref 6–8.3)
PROT UR-MCNC: ABNORMAL
RBC # BLD: 5.26 M/UL — SIGNIFICANT CHANGE UP (ref 4.2–5.8)
RBC # FLD: 16.2 % — HIGH (ref 10.3–14.5)
RBC CASTS # UR COMP ASSIST: 3 /HPF — SIGNIFICANT CHANGE UP (ref 0–4)
SODIUM SERPL-SCNC: 134 MMOL/L — LOW (ref 135–145)
SP GR SPEC: 1.02 — SIGNIFICANT CHANGE UP (ref 1.01–1.02)
UROBILINOGEN FLD QL: SIGNIFICANT CHANGE UP
WBC # BLD: 7.83 K/UL — SIGNIFICANT CHANGE UP (ref 3.8–10.5)
WBC # FLD AUTO: 7.83 K/UL — SIGNIFICANT CHANGE UP (ref 3.8–10.5)
WBC UR QL: 3 /HPF — SIGNIFICANT CHANGE UP (ref 0–5)

## 2021-05-04 PROCEDURE — 99284 EMERGENCY DEPT VISIT MOD MDM: CPT

## 2021-05-04 NOTE — ED PROVIDER NOTE - NSFOLLOWUPINSTRUCTIONS_ED_ALL_ED_FT
You were seen in the Emergency Department (ED) for high blood sugar. You do not have a urinary tract infection.     Take your home medications as prescribed.     Please follow up with your primary care doctor in the next 72 hours.     Please return to the ED if you experience any new or concerning symptoms, such as:   - chest pain  - difficulty breathing  - passing out  - unable to eat or drink  - unable to move or feel part of your body  - fever, chills    Thank you for visiting a [facility] ED.

## 2021-05-04 NOTE — ED PROVIDER NOTE - NS ED ROS FT
GENERAL: No fever, chills  EYES: no vision changes, no discharge.   ENT: no difficulty swallowing or speaking   CARDIAC: no chest pain/pressure, SOB, lower extremity swelling  PULMONARY: no cough, SOB  GI: no abdominal pain, n/v/d  : no dysuria, no hematuria + urinary frequency  SKIN: no rashes, no ecchymosis  NEURO: no headache, lightheadedness  MSK: No joint pain, myalgia, weakness.

## 2021-05-04 NOTE — ED PROVIDER NOTE - CLINICAL SUMMARY MEDICAL DECISION MAKING FREE TEXT BOX
69 yO M pmhx DM on insulin (non compliant), sent in by PMD for hyperglycemia. No significant finding on exam,  in ED. likely hyperglycemia, less likely in DKA. DKA labs, UA to r/o UTI. osmany AVILA home.

## 2021-05-04 NOTE — ED PROVIDER NOTE - PATIENT PORTAL LINK FT
You can access the FollowMyHealth Patient Portal offered by [facility] by registering at the following website: [email or web address]. By joining [facility]’s FollowMyHealth portal, you will also be able to view your health information using other applications (apps) compatible with our system.

## 2021-05-04 NOTE — ED PROVIDER NOTE - PHYSICAL EXAMINATION
GEN: Patient awake alert NAD.   HEENT: normocephalic, atraumatic, EOMI, no scleral icterus  CARDIAC: RRR, S1, S2, no murmur.   PULM: CTA B/L no wheeze, rhonchi, rales.   ABD: soft NT, ND, no rebound no guarding  MSK: Moving all extremities, no edema. 5/5 strength and full ROM in all extremities.     NEURO: A&Ox3, gait normal, no focal neurological deficits, CN 2-12 grossly intact  SKIN: warm, dry, no rash.

## 2021-05-04 NOTE — ED PROVIDER NOTE - ATTENDING CONTRIBUTION TO CARE
Jose KHAN: I agree with the above provided history and exam and addend/modify it as follows.    70M w/ pmh DM (metformin and glipizide) - sent by pcp for high blood sugar readings, in the 500s. Pt reports urinary frequency. Denies sob, cough, fever, n/v/d/c, dysuria, hematuria. Here in ED Glc is 200.     *GEN:   comfortable, in no acute distress, AOx3  *EYES:   pupils equally round and reactive to light, extra-occular movements intact  *HEENT:   airway patent  *CV:   regular rate and rhythm  *RESP:   clear to auscultation bilaterally, non-labored  *ABD:   soft, non-tender  *:   no cva/flank tenderness  *EXTREM:   no MSK tenderness, full ROM throughout, no leg swelling  *SKIN:   dry, intact  *NEURO:   AOx3, no focal weakness or loss of sensation     70M p/w hyperglycemia, only urinary frequency but no other complaints. Glc here is much improved compared with reported outpatient glc. Plan to check screening labs, ua, - r/o DKA but highly unlikely at this time; if results wnl likely dc w/ close outpt f/u    I Mak Garcia MD performed a history and physical exam of the patient and discussed their management with the resident and /or advanced care provider. I reviewed the resident and /or ACP's note and agree with the documented findings and plan of care. My medical decision making and observations are found above.

## 2021-05-04 NOTE — ED PROVIDER NOTE - OBJECTIVE STATEMENT
71 yO M pmhx DM on insulin (non compliant), sent in by PMD for hyperglycemia. Pt states he had outpt blood work and checkup with PMD on Friday, and was instructed to go to ED today for elevated blood sugar in the 500's. Pt endorse urinary frewuency and no other complaints, states he is otherwise at baseline. Pt denies fever chills, GI, 69 yO M pmhx DM on insulin (non compliant), sent in by PMD for hyperglycemia. Pt states he had outpt blood work and checkup with PMD on Friday, and was instructed to go to ED today for elevated blood sugar in the 500's. Pt endorse urinary frewuency and no other complaints, states he is otherwise at baseline. Pt denies fever chills, CP, SOB, GI, dysuria, hematuria.

## 2021-05-04 NOTE — ED ADULT NURSE NOTE - OBJECTIVE STATEMENT
pt received to 5, aox4.  pt c/o "I was at my PMD office the other day for routine blood work and ate a big meal before hand, on my blood work my bloos sugar was elevated and I was told to come to the ED".  pt deneis dizziness, weakness, lightheadedness.  pt offers no complaints.  SL placed, labs sent, awaiitng further orders, will monitor

## 2021-05-05 LAB
CULTURE RESULTS: SIGNIFICANT CHANGE UP
SPECIMEN SOURCE: SIGNIFICANT CHANGE UP

## 2021-09-16 ENCOUNTER — RX RENEWAL (OUTPATIENT)
Age: 71
End: 2021-09-16

## 2021-10-11 ENCOUNTER — APPOINTMENT (OUTPATIENT)
Dept: ENDOCRINOLOGY | Facility: CLINIC | Age: 71
End: 2021-10-11

## 2022-02-10 ENCOUNTER — APPOINTMENT (OUTPATIENT)
Dept: ENDOCRINOLOGY | Facility: CLINIC | Age: 72
End: 2022-02-10
Payer: MEDICARE

## 2022-02-10 ENCOUNTER — LABORATORY RESULT (OUTPATIENT)
Age: 72
End: 2022-02-10

## 2022-02-10 VITALS
TEMPERATURE: 98 F | SYSTOLIC BLOOD PRESSURE: 124 MMHG | BODY MASS INDEX: 22.73 KG/M2 | HEIGHT: 68 IN | HEART RATE: 55 BPM | WEIGHT: 150 LBS | RESPIRATION RATE: 16 BRPM | DIASTOLIC BLOOD PRESSURE: 60 MMHG | OXYGEN SATURATION: 91 %

## 2022-02-10 LAB — GLUCOSE BLDC GLUCOMTR-MCNC: 113

## 2022-02-10 PROCEDURE — 99214 OFFICE O/P EST MOD 30 MIN: CPT | Mod: 25

## 2022-02-10 PROCEDURE — 95250 CONT GLUC MNTR PHYS/QHP EQP: CPT

## 2022-02-10 PROCEDURE — 95251 CONT GLUC MNTR ANALYSIS I&R: CPT

## 2022-02-10 RX ORDER — METFORMIN ER 500 MG 500 MG/1
500 TABLET ORAL DAILY
Qty: 270 | Refills: 3 | Status: DISCONTINUED | COMMUNITY
Start: 2020-05-29 | End: 2022-02-10

## 2022-02-10 NOTE — ASSESSMENT
[Carbohydrate Consistent Diet] : carbohydrate consistent diet [Hypoglycemia Management] : hypoglycemia management [Glucagon Use] : glucagon use [Diabetes Foot Care] : diabetes foot care [Long Term Vascular Complications] : long term vascular complications of diabetes [Importance of Diet and Exercise] : importance of diet and exercise to improve glycemic control, achieve weight loss and improve cardiovascular health [Action and use of Insulin] : action and use of short and long-acting insulin [Self Monitoring of Blood Glucose] : self monitoring of blood glucose [Insulin Self-Administration] : insulin self-administration [Injection Technique, Storage, Sharps Disposal] : injection technique, storage, and sharps disposal [Incretin Mimetic Therapy] : Risks and benefits of incretin mimetic therapy were discussed with the patient including nauseau, pancreatitis and potential risk of medullary thyroid cancer [FreeTextEntry1] : T2DM, severely uncontrolled\par - extremely poor compliance- advised on meds adherence and danger of diabetic complications\par - I've also advised on possibility discharging from the practice due to non-compliance issues\par - labs today\par - at present, I need more information about his FS and blood work\par - Christiano PRO\par - needs to bring all his medications for review\par - no SGLT2 given low GFR. F/u with renal!\par - to see CDE\par - continue lipitor 20mg HS,  cozaar 100mg, norvasc 5 mg\par - he declined personal sensors\par - follow up plasma K+ results. low K+ diet. might have to stop cozaar\par \par RTC 2- 3 weeks for sensor download and CDE evaluation\par

## 2022-02-10 NOTE — HISTORY OF PRESENT ILLNESS
[FreeTextEntry1] : F/u for diabetes management\par \par *** Feb 10, 2022 ***\par \par missed f/u appts\par was in [place]- stopped meds for some time, was admitted for acure renal failure, uncontrolled diabetes\par no recent labs\par still taking prandin, cozaar, norvasc, chlorthalidone. Off metformin. not taking pioglitazone\par also, taking novolin 70/30 (?- not sure how much and prescribed by whom)\par log: fbs- 111-264, pm- 102-264\par \par *** Dec 14, 2020 ***\par \par received Christiano 2- teaching today\par takes Relion 9-10,  metformin er 500mg 3 tablets with dinner ,  actos 15 mg, prandin 1-2-2\par no more leg swelling\par reports fbs- 88- upto 130, ppg-  low 100's\par \par *** Sep 14, 2020 ***\par \par resumed Relion 8-7,  metformin er 500mg 3 tablets with dinner,  actos 15 mg,  prandin 1-2-2\par had labs done this am for potassium recheck- still pending results\par states that his sugars are better since changing the regimen\par \par *** Sep 09, 2020 ***\par \par unable to afford any basal insulin\par takes ReliOn 8 un in am, 7 un in pm,  metformin er 500mg 2 tablets with dinner ,  actos 15 mg,   drisdol 50K qw,  lipitor 20mg HS,  cozaar 100mg, norvasc 5 mg\par swelling resolved with decreasing actos\par did not start prandin yet\par reports fbs- 115's, not checking ppg\par stopped insulin few days ago for unclear reason. today ppg in the office- 317\par \par *** Jun 12, 2020 ***\par \par taking Basaglar 14 units HS, metformin er 500mg 2 tablets with dinner ,  amaryl 4mg qd , actos 30mg qd, drisdol 50K qw\par feels much better. noticed some ankle swelling\par tolerates metformin very well\par reports fbs in 80's- low 90's, not checking ppg\par \par Date of download:  6/12/20\par Diabetes Medications and Dosage: as above\par Indication for CGMS: verify a change in the treatment regimen, identify periods of hypoglycemia/ hyperglycemia. \par Modal day report: pattern. \par Pt with HYPO 4 % of the time (5% below 54),  52% in target range\par Hyperglycemia:  39% elevation \par Identified issues: carbohydrate counting, spiking post lunch and dinner\par dates analyzed: 5/29/20-6/12/20\par \par \par *** May 29, 2020 ***\par \par ran out of Soliqua about a month ago (not covered)  and his blood pressure medications\par diarrhea on metformin 1g bid\par taking actos 30 mg qd\par states that FS were in 90's when was taking Soliqua, now reports fbs and ppg in 180's- 200's\par \par HISTORY OF PRESENT ILLNESS. \par \par Mr. BLAND was diagnosed with Diabetes Mellitus Type 2 in 2001 \par Reports history HTN, dyslipidemia, proteinuria/nephropathy. \par He denies CAD,  known complications of retinopathy or neuropathy. \par Presently on metformin 500 mg tid, amaryl 4mg qd, prandin 1mg tid, losartan 100mg, norvasc 5 mg, lipitor 20mg HS\par He stopped checking his blood sugars at home. \par Hypoglycemia frequency: occasional subjective hypoglycemia\par Fingerstick glucose in the office today is 196 mg/dL 2 hours after eating. \par Diet: not following ADA\par Exercise: \par \par Last dilated eye - 9/19\par Last podiatry visit  - many years ago\par Last cardiology evaluation - 8/19\par Last stress test - 8/19\par Last 2-D Echo 8/19\par Last nephrology evaluation - 2019\par Last neurology evaluation- none\par \par Lab review: a1c- 10.8 <-- 9.7 <--13.2; creatinine- 1.45 <-- 1.42, 25D- 13.6, macroalbuminuria, TSH- 2.67, LDL- 136\par \par

## 2022-02-10 NOTE — ED PROVIDER NOTE - CONDUCTED A DETAILED DISCUSSION WITH PATIENT AND/OR GUARDIAN REGARDING, MDM
Previously Declined (within the last year) need for outpatient follow-up/return to ED if symptoms worsen, persist or questions arise/radiology results

## 2022-02-11 LAB
25(OH)D3 SERPL-MCNC: 29.9 NG/ML
ALBUMIN SERPL ELPH-MCNC: 4.5 G/DL
ALP BLD-CCNC: 88 U/L
ALT SERPL-CCNC: 12 U/L
ANION GAP SERPL CALC-SCNC: 15 MMOL/L
AST SERPL-CCNC: 15 U/L
BILIRUB SERPL-MCNC: 0.5 MG/DL
BUN SERPL-MCNC: 61 MG/DL
C PEPTIDE SERPL-MCNC: 4 NG/ML
CALCIUM SERPL-MCNC: 9.5 MG/DL
CHLORIDE SERPL-SCNC: 106 MMOL/L
CHOLEST SERPL-MCNC: 216 MG/DL
CO2 SERPL-SCNC: 16 MMOL/L
CREAT SERPL-MCNC: 3.11 MG/DL
CREAT SPEC-SCNC: 115 MG/DL
ESTIMATED AVERAGE GLUCOSE: 309 MG/DL
FRUCTOSAMINE SERPL-MCNC: 387 UMOL/L
GLUCOSE SERPL-MCNC: 117 MG/DL
HBA1C MFR BLD HPLC: 12.4 %
HDLC SERPL-MCNC: 53 MG/DL
LDLC SERPL CALC-MCNC: 129 MG/DL
MICROALBUMIN 24H UR DL<=1MG/L-MCNC: 181.7 MG/DL
MICROALBUMIN/CREAT 24H UR-RTO: 1574 MG/G
NONHDLC SERPL-MCNC: 163 MG/DL
POTASSIUM SERPL-SCNC: 4.7 MMOL/L
POTASSIUM SERPL-SCNC: 4.8 MMOL/L
PROT SERPL-MCNC: 7.2 G/DL
SODIUM SERPL-SCNC: 137 MMOL/L
T4 FREE SERPL-MCNC: 1.2 NG/DL
TRIGL SERPL-MCNC: 167 MG/DL
TSH SERPL-ACNC: 2.67 UIU/ML

## 2022-02-13 LAB
BASOPHILS # BLD AUTO: 0.05 K/UL
BASOPHILS NFR BLD AUTO: 0.6 %
EOSINOPHIL # BLD AUTO: 0.41 K/UL
EOSINOPHIL NFR BLD AUTO: 5.1 %
HCT VFR BLD CALC: 31 %
HGB BLD-MCNC: 9.3 G/DL
IMM GRANULOCYTES NFR BLD AUTO: 0.2 %
LYMPHOCYTES # BLD AUTO: 1.52 K/UL
LYMPHOCYTES NFR BLD AUTO: 18.9 %
MAN DIFF?: NORMAL
MCHC RBC-ENTMCNC: 20.2 PG
MCHC RBC-ENTMCNC: 30 GM/DL
MCV RBC AUTO: 67.2 FL
MONOCYTES # BLD AUTO: 0.48 K/UL
MONOCYTES NFR BLD AUTO: 6 %
NEUTROPHILS # BLD AUTO: 5.58 K/UL
NEUTROPHILS NFR BLD AUTO: 69.2 %
PLATELET # BLD AUTO: 252 K/UL
RBC # BLD: 4.61 M/UL
RBC # FLD: 17.7 %
WBC # FLD AUTO: 8.06 K/UL

## 2022-02-17 ENCOUNTER — APPOINTMENT (OUTPATIENT)
Dept: INTERNAL MEDICINE | Facility: CLINIC | Age: 72
End: 2022-02-17
Payer: MEDICARE

## 2022-02-17 ENCOUNTER — NON-APPOINTMENT (OUTPATIENT)
Age: 72
End: 2022-02-17

## 2022-02-17 VITALS
SYSTOLIC BLOOD PRESSURE: 160 MMHG | OXYGEN SATURATION: 100 % | HEART RATE: 60 BPM | DIASTOLIC BLOOD PRESSURE: 61 MMHG | HEIGHT: 68 IN | BODY MASS INDEX: 22.73 KG/M2 | TEMPERATURE: 97.8 F | WEIGHT: 150 LBS

## 2022-02-17 VITALS — SYSTOLIC BLOOD PRESSURE: 155 MMHG | DIASTOLIC BLOOD PRESSURE: 80 MMHG

## 2022-02-17 PROCEDURE — G0444 DEPRESSION SCREEN ANNUAL: CPT | Mod: 59

## 2022-02-17 PROCEDURE — 99213 OFFICE O/P EST LOW 20 MIN: CPT | Mod: 25

## 2022-02-17 PROCEDURE — G0439: CPT

## 2022-02-17 PROCEDURE — 93000 ELECTROCARDIOGRAM COMPLETE: CPT | Mod: 59

## 2022-02-17 NOTE — PLAN
[FreeTextEntry1] : D/c paper from the hospital was reviewed\par Patient  did a ct scan of the abd/pelvis (mildly atrophic kidney, enlarged prostate)\par Labs were reviewed from  02/10/22\par Has an uncontrolled DM2, HbA1C is 12.4  be compliant with meds(patient do not remember meds and did not bring with him). Close f/u with endocrinologist, dietician\par F/u with ophtalmologist, podiatrist\par For CRI, f/u with nephrologist\par For a HTN, recommend  to be compliant with BP meds(losartan and norvasc), rx were issued)\par For a hyperlipidemia, recommend  to increase lipitor to 40mg qhs(as per patient he sometimes forget to take medication), will continue lipitor 20 mg qhs, repeat a blood test in 3 mo\par For an anemia, in the past 2018 had a colonoscopy, referred to see a GI again\par Anemia can be due to anemia of chronic disease(CRI)\par HTN, DM2, EKG was done and reviewed Sinus bradycardia, T inversion in lead V1, referred to see a cardiologist for a possible stress test\par \par

## 2022-02-17 NOTE — HEALTH RISK ASSESSMENT
[Good] : ~his/her~  mood as  good [Intercurrent hospitalizations] : was admitted to the hospital  [Never] : Never [No] : No [No falls in past year] : Patient reported no falls in the past year [0] : 2) Feeling down, depressed, or hopeless: Not at all (0) [With Family] : lives with family [# of Members in Household ___] :  household currently consist of [unfilled] member(s) [Retired] : retired [High School] : high school [] :  [Sexually Active] : sexually active [Feels Safe at Home] : Feels safe at home [Fully functional (bathing, dressing, toileting, transferring, walking, feeding)] : Fully functional (bathing, dressing, toileting, transferring, walking, feeding) [Fully functional (using the telephone, shopping, preparing meals, housekeeping, doing laundry, using] : Fully functional and needs no help or supervision to perform IADLs (using the telephone, shopping, preparing meals, housekeeping, doing laundry, using transportation, managing medications and managing finances) [Smoke Detector] : smoke detector [Carbon Monoxide Detector] : carbon monoxide detector [Safety elements used in home] : safety elements used in home [Seat Belt] :  uses seat belt [[id number]] : 0 [Change in mental status noted] : No change in mental status noted [Language] : denies difficulty with language [Behavior] : denies difficulty with behavior [Handling Complex Tasks] : denies difficulty handling complex tasks [High Risk Behavior] : no high risk behavior [Reports changes in hearing] : Reports no changes in hearing [Reports changes in vision] : Reports no changes in vision [Reports normal functional visual acuity (ie: able to read med bottle)] : Reports poor functional visual acuity.  [Reports changes in dental health] : Reports no changes in dental health [Guns at Home] : no guns at home [Sunscreen] : does not use sunscreen [ColonoscopyDate] : 08/19 [ColonoscopyComments] : IH [HIVDate] : 05/14/19 [HIVComments] : negative [HepatitisCDate] : 05/14/19 [HepatitisCComments] : negative

## 2022-03-01 ENCOUNTER — APPOINTMENT (OUTPATIENT)
Dept: ENDOCRINOLOGY | Facility: CLINIC | Age: 72
End: 2022-03-01
Payer: MEDICARE

## 2022-03-01 PROCEDURE — G0108 DIAB MANAGE TRN  PER INDIV: CPT

## 2022-03-17 ENCOUNTER — APPOINTMENT (OUTPATIENT)
Dept: OPHTHALMOLOGY | Facility: CLINIC | Age: 72
End: 2022-03-17

## 2022-03-28 ENCOUNTER — APPOINTMENT (OUTPATIENT)
Dept: ENDOCRINOLOGY | Facility: CLINIC | Age: 72
End: 2022-03-28

## 2022-03-29 ENCOUNTER — APPOINTMENT (OUTPATIENT)
Dept: PODIATRY | Facility: CLINIC | Age: 72
End: 2022-03-29

## 2022-04-05 ENCOUNTER — APPOINTMENT (OUTPATIENT)
Dept: ENDOCRINOLOGY | Facility: CLINIC | Age: 72
End: 2022-04-05

## 2022-04-26 ENCOUNTER — APPOINTMENT (OUTPATIENT)
Dept: CARDIOLOGY | Facility: CLINIC | Age: 72
End: 2022-04-26

## 2022-05-26 ENCOUNTER — APPOINTMENT (OUTPATIENT)
Dept: ENDOCRINOLOGY | Facility: CLINIC | Age: 72
End: 2022-05-26
Payer: MEDICARE

## 2022-05-26 VITALS
HEIGHT: 68 IN | BODY MASS INDEX: 22.73 KG/M2 | TEMPERATURE: 98.1 F | HEART RATE: 66 BPM | WEIGHT: 150 LBS | OXYGEN SATURATION: 99 % | DIASTOLIC BLOOD PRESSURE: 78 MMHG | SYSTOLIC BLOOD PRESSURE: 140 MMHG

## 2022-05-26 LAB — GLUCOSE BLDC GLUCOMTR-MCNC: 89

## 2022-05-26 PROCEDURE — 99214 OFFICE O/P EST MOD 30 MIN: CPT | Mod: 25

## 2022-05-26 PROCEDURE — 36415 COLL VENOUS BLD VENIPUNCTURE: CPT

## 2022-05-26 NOTE — ASSESSMENT
[Carbohydrate Consistent Diet] : carbohydrate consistent diet [Hypoglycemia Management] : hypoglycemia management [Glucagon Use] : glucagon use [Diabetes Foot Care] : diabetes foot care [Long Term Vascular Complications] : long term vascular complications of diabetes [Importance of Diet and Exercise] : importance of diet and exercise to improve glycemic control, achieve weight loss and improve cardiovascular health [Action and use of Insulin] : action and use of short and long-acting insulin [Self Monitoring of Blood Glucose] : self monitoring of blood glucose [Insulin Self-Administration] : insulin self-administration [Injection Technique, Storage, Sharps Disposal] : injection technique, storage, and sharps disposal [Incretin Mimetic Therapy] : Risks and benefits of incretin mimetic therapy were discussed with the patient including nauseau, pancreatitis and potential risk of medullary thyroid cancer [FreeTextEntry1] : T2DM, severely uncontrolled\par - extremely poor compliance- advised on meds adherence and danger of diabetic complications\par - I've also advised on possibility discharging from the practice due to non-compliance issues\par - labs today\par - at present, I need more information about his FS and blood work\par - Christiano PRO\par - no SGLT2 given low GFR. F/u with renal!\par - d/c prandin\par - Novolog 70/30 16 ac B and 12 un ac D\par - continue lipitor 20mg HS,  cozaar 50mg, norvasc 10 mg but monitor for feet swelling\par - he declined personal sensors\par - follow up plasma K+ results. low K+ diet. might have to stop cozaar\par - again advised to f/u with renal\par \par RTC 2- 3 weeks for sensor download

## 2022-05-26 NOTE — HISTORY OF PRESENT ILLNESS
[FreeTextEntry1] : F/u for diabetes management\par \par *** May 26, 2022 ***\par \par lost insurance again, was not taking most of his meds for some time\par resumed Novolog 70/30 8 un at HS (?), prandin 1 mg tid ac , norvasc 10 mg, losartan 50 mg \par not checking his FS\par \par \par *** Feb 10, 2022 ***\par \par missed f/u appts\par was in [place]- stopped meds for some time, was admitted for acute renal failure, uncontrolled diabetes\par no recent labs\par still taking prandin, cozaar, norvasc, chlorthalidone. Off metformin. not taking pioglitazone\par also, taking novolin 70/30 (?- not sure how much and prescribed by whom)\par log: fbs- 111-264, pm- 102-264\par \par *** Dec 14, 2020 ***\par \par received Christiano 2- teaching today\par takes Relion 9-10,  metformin er 500mg 3 tablets with dinner ,  actos 15 mg, prandin 1-2-2\par no more leg swelling\par reports fbs- 88- upto 130, ppg-  low 100's\par \par *** Sep 14, 2020 ***\par \par resumed Relion 8-7,  metformin er 500mg 3 tablets with dinner,  actos 15 mg,  prandin 1-2-2\par had labs done this am for potassium recheck- still pending results\par states that his sugars are better since changing the regimen\par \par *** Sep 09, 2020 ***\par \par unable to afford any basal insulin\par takes ReliOn 8 un in am, 7 un in pm,  metformin er 500mg 2 tablets with dinner ,  actos 15 mg,   drisdol 50K qw,  lipitor 20mg HS,  cozaar 100mg, norvasc 5 mg\par swelling resolved with decreasing actos\par did not start prandin yet\par reports fbs- 115's, not checking ppg\par stopped insulin few days ago for unclear reason. today ppg in the office- 317\par \par *** Jun 12, 2020 ***\par \par taking Basaglar 14 units HS, metformin er 500mg 2 tablets with dinner ,  amaryl 4mg qd , actos 30mg qd, drisdol 50K qw\par feels much better. noticed some ankle swelling\par tolerates metformin very well\par reports fbs in 80's- low 90's, not checking ppg\par \par Date of download:  6/12/20\par Diabetes Medications and Dosage: as above\par Indication for CGMS: verify a change in the treatment regimen, identify periods of hypoglycemia/ hyperglycemia. \par Modal day report: pattern. \par Pt with HYPO 4 % of the time (5% below 54),  52% in target range\par Hyperglycemia:  39% elevation \par Identified issues: carbohydrate counting, spiking post lunch and dinner\par dates analyzed: 5/29/20-6/12/20\par \par \par *** May 29, 2020 ***\par \par ran out of Soliqua about a month ago (not covered)  and his blood pressure medications\par diarrhea on metformin 1g bid\par taking actos 30 mg qd\par states that FS were in 90's when was taking Soliqua, now reports fbs and ppg in 180's- 200's\par \par HISTORY OF PRESENT ILLNESS. \par \par Mr. BLAND was diagnosed with Diabetes Mellitus Type 2 in 2001 \par Reports history HTN, dyslipidemia, proteinuria/nephropathy. \par He denies CAD,  known complications of retinopathy or neuropathy. \par Presently on metformin 500 mg tid, amaryl 4mg qd, prandin 1mg tid, losartan 100mg, norvasc 5 mg, lipitor 20mg HS\par He stopped checking his blood sugars at home. \par Hypoglycemia frequency: occasional subjective hypoglycemia\par Fingerstick glucose in the office today is 196 mg/dL 2 hours after eating. \par Diet: not following ADA\par Exercise: \par \par Lab review: a1c- 10.8 <-- 9.7 <--13.2; creatinine- 1.45 <-- 1.42, 25D- 13.6, macroalbuminuria, TSH- 2.67, LDL- 136\par \par ****\par \par Last dilated eye - 9/19\par Last podiatry visit  - many years ago\par Last cardiology evaluation - 8/19\par Last stress test - 8/19\par Last 2-D Echo 8/19\par Last nephrology evaluation - 2019\par Last neurology evaluation- none\par

## 2022-06-02 ENCOUNTER — APPOINTMENT (OUTPATIENT)
Dept: INTERNAL MEDICINE | Facility: CLINIC | Age: 72
End: 2022-06-02

## 2022-06-13 ENCOUNTER — APPOINTMENT (OUTPATIENT)
Dept: NEPHROLOGY | Facility: CLINIC | Age: 72
End: 2022-06-13
Payer: MEDICARE

## 2022-06-13 VITALS
SYSTOLIC BLOOD PRESSURE: 144 MMHG | BODY MASS INDEX: 22.88 KG/M2 | TEMPERATURE: 98.5 F | HEART RATE: 54 BPM | HEIGHT: 68 IN | DIASTOLIC BLOOD PRESSURE: 72 MMHG | WEIGHT: 151 LBS | OXYGEN SATURATION: 98 %

## 2022-06-13 VITALS — DIASTOLIC BLOOD PRESSURE: 78 MMHG | SYSTOLIC BLOOD PRESSURE: 124 MMHG

## 2022-06-13 PROCEDURE — 99204 OFFICE O/P NEW MOD 45 MIN: CPT

## 2022-06-13 RX ORDER — HYDRALAZINE HYDROCHLORIDE 50 MG/1
50 TABLET ORAL 3 TIMES DAILY
Qty: 90 | Refills: 5 | Status: DISCONTINUED | COMMUNITY
Start: 2020-12-15 | End: 2022-06-13

## 2022-06-13 NOTE — PHYSICAL EXAM
[General Appearance - Alert] : alert [General Appearance - In No Acute Distress] : in no acute distress [General Appearance - Well Nourished] : well nourished [Sclera] : the sclera and conjunctiva were normal [Outer Ear] : the ears and nose were normal in appearance [Hearing Threshold Finger Rub Not [place]] : hearing was normal [Neck Appearance] : the appearance of the neck was normal [Respiration, Rhythm And Depth] : normal respiratory rhythm and effort [Exaggerated Use Of Accessory Muscles For Inspiration] : no accessory muscle use [Auscultation Breath Sounds / Voice Sounds] : lungs were clear to auscultation bilaterally [Apical Impulse] : the apical impulse was normal [Heart Rate And Rhythm] : heart rate was normal and rhythm regular [Heart Sounds] : normal S1 and S2 [Edema] : there was no peripheral edema [Veins - Varicosity Changes] : there were no varicosital changes [Bowel Sounds] : normal bowel sounds [Abdomen Tenderness] : non-tender [No CVA Tenderness] : no ~M costovertebral angle tenderness [No Spinal Tenderness] : no spinal tenderness [Abnormal Walk] : normal gait [Musculoskeletal - Swelling] : no joint swelling seen [Skin Color & Pigmentation] : normal skin color and pigmentation [Skin Turgor] : normal skin turgor [] : no rash [Skin Lesions] : no skin lesions [Sensation] : the sensory exam was normal to light touch and pinprick [Cranial Nerves] : cranial nerves 2-12 were intact [No Focal Deficits] : no focal deficits [Impaired Insight] : insight and judgment were intact [Mood] : the mood was normal [Affect] : the affect was normal

## 2022-06-13 NOTE — ASSESSMENT
[FreeTextEntry1] : \par 1. Chronic kidney disease stage 4  (Proteinuria). \par Patient's baseline serum creatinine is 3.3mg/dL, with a corresponding eGFR of around 20-22 ml/min/1.73m2.\par The etiology of CKD is likely secondary to diabetic nephropathy. I will get records from Dr. Mcwilliams. \par In order to slow progression, patient is advised have good blood pressure and blood glucose control and to avoid NSAIDs. \par He just had blood work done on Piper 10, 2022 at [facility]. \par >50% of the encounter was spent discussing about kidney function, stages of CKD, and steps to slow progression of kidney disease. \par \par 2. Hyperkalemia - likely due to CKD and losartan. Potassium improved with low potassium diet. \par \par 3. HTN - Goal BP for patient is < 130/80. \par Patient's current BP is acceptable. \par Continue amlodipine 10mg daily and losartan 100mg daily. \par Low salt diet recommended.\par \par 4. Metabolic acidosis - secondary to CKD. \par I will get lab report. If serum bicarb is < 20, we will consider starting sodium bicarb. \par \par 5. Medication toxicity monitoring. Medication dose reviewed. Since he is taking losartan, \par we will monitor for hyperkalemia.\par \par Patient is recommended to follow up in 6-8 weeks. \par \par

## 2022-06-13 NOTE — HISTORY OF PRESENT ILLNESS
[FreeTextEntry1] :  Mr. JIMMY BLAND is a 71 year-old man with a PMH of long-standing DM and HTN who [facility] for the management of Stage 4 CKD. \par \par \par Kidney history:\par Mr. BLAND has a baseline serum creatinine of 3.3mg/dL, with a corresponding eGFR of 20-22 ml/min/1.73m2.\par He was seeing Dr. Mcwilliams in Nov 21 and Feb 22. Dr. Mcwilliams had attributed the cause of CKD to Diabetes. No biopsy was done. He was also told that he had proteinuria. He has to switch nephrologist because of insurance issues. \par \par He denies having nausea/vomiting/diarrhea. He has a good appetite. He has frothy urine, but denies hematuria, or dysuria. He also has nocturia, but no hesitancy. He denies shortness of breath, chest pain, headache, edema. No hand tremors. He  denies skin rash, joint pains or hair loss. He takes ibuprofen 400mg for headache/ back pain once every 2-3 months. \par No family history of kidney disease.\par \par DM:\par Diagnosed in 2002\par Currently on insulin and pioglitazone. \par \par HTN:\par Diagnosed in 2000s\par He checks BP at home occasionally, with BP readings ranging from 125//70.\par Meals: mixed home cooked meal or eat out. [place], fast food. \par \par Nephrolithiasis:no\par

## 2022-06-15 ENCOUNTER — NON-APPOINTMENT (OUTPATIENT)
Age: 72
End: 2022-06-15

## 2022-06-15 RX ORDER — CHLORTHALIDONE 25 MG/1
25 TABLET ORAL DAILY
Qty: 45 | Refills: 3 | Status: DISCONTINUED | COMMUNITY
Start: 2022-06-13 | End: 2022-06-15

## 2022-06-15 RX ORDER — LOSARTAN POTASSIUM 100 MG/1
100 TABLET, FILM COATED ORAL DAILY
Qty: 90 | Refills: 1 | Status: DISCONTINUED | COMMUNITY
Start: 2022-02-17 | End: 2022-06-15

## 2022-06-20 RX ORDER — CHLORTHALIDONE 25 MG/1
25 TABLET ORAL
Qty: 45 | Refills: 0 | Status: DISCONTINUED | COMMUNITY
Start: 2021-11-26 | End: 2022-06-20

## 2022-07-07 RX ORDER — HUMAN INSULIN 100 [IU]/ML
100 INJECTION, SUSPENSION SUBCUTANEOUS
Qty: 1 | Refills: 5 | Status: ACTIVE | COMMUNITY
Start: 2020-07-09

## 2022-08-01 ENCOUNTER — APPOINTMENT (OUTPATIENT)
Dept: NEPHROLOGY | Facility: CLINIC | Age: 72
End: 2022-08-01

## 2022-08-01 VITALS
WEIGHT: 148 LBS | HEART RATE: 52 BPM | TEMPERATURE: 97.6 F | SYSTOLIC BLOOD PRESSURE: 203 MMHG | OXYGEN SATURATION: 98 % | HEIGHT: 68 IN | DIASTOLIC BLOOD PRESSURE: 73 MMHG | BODY MASS INDEX: 22.43 KG/M2

## 2022-08-01 VITALS — DIASTOLIC BLOOD PRESSURE: 80 MMHG | SYSTOLIC BLOOD PRESSURE: 180 MMHG

## 2022-08-01 VITALS — SYSTOLIC BLOOD PRESSURE: 200 MMHG | DIASTOLIC BLOOD PRESSURE: 80 MMHG

## 2022-08-01 PROCEDURE — 99215 OFFICE O/P EST HI 40 MIN: CPT

## 2022-08-01 NOTE — ASSESSMENT
[FreeTextEntry1] : \par 1. Chronic kidney disease stage 5  (Proteinuria). \par Patient's baseline serum creatinine is 4.8mg/dL, with a corresponding eGFR of around 12-15 ml/min/1.73m2.\par The etiology of CKD is likely secondary to diabetic nephropathy. \par In order to slow progression, patient is advised have good blood pressure and blood glucose control and to avoid NSAIDs. \par >50% of the encounter was spent discussing about kidney function, stages of CKD, and steps to slow progression of kidney disease. \par \par 2. Hyperkalemia - likely due to CKD. Continue low potassium diet and furosemide. \par \par 3. HTN - Goal BP for patient is < 130/80. \par Patient's current BP is acceptable. \par Continue amlodipine 10mg daily and furosemide 40mg BID.\par For hydralazien 25mg TID (7am, 2pm, 8pm).\par If early morning BP >200, to take 50mg am instead of 25mg. \par Low salt diet recommended.\par \par 4. Metabolic acidosis - secondary to CKD. \par I will get lab report. If serum bicarb is < 20, we will consider starting sodium bicarb. \par \par 5. Medication toxicity monitoring. Medication dose reviewed. Since he is taking furosemide, \par we will monitor for hypokalemia.\par \par Patient is recommended to follow up in 6-8 weeks. \par - Labwork done at [facility] 7/29/22. \par \par

## 2022-08-01 NOTE — PHYSICAL EXAM
[General Appearance - Alert] : alert [General Appearance - In No Acute Distress] : in no acute distress [General Appearance - Well Nourished] : well nourished [Sclera] : the sclera and conjunctiva were normal [Outer Ear] : the ears and nose were normal in appearance [Hearing Threshold Finger Rub Not [place]] : hearing was normal [Neck Appearance] : the appearance of the neck was normal [Respiration, Rhythm And Depth] : normal respiratory rhythm and effort [Exaggerated Use Of Accessory Muscles For Inspiration] : no accessory muscle use [Auscultation Breath Sounds / Voice Sounds] : lungs were clear to auscultation bilaterally [Apical Impulse] : the apical impulse was normal [Heart Rate And Rhythm] : heart rate was normal and rhythm regular [Heart Sounds] : normal S1 and S2 [Edema] : there was no peripheral edema [Veins - Varicosity Changes] : there were no varicosital changes [Bowel Sounds] : normal bowel sounds [Abdomen Tenderness] : non-tender [No CVA Tenderness] : no ~M costovertebral angle tenderness [No Spinal Tenderness] : no spinal tenderness [Abnormal Walk] : normal gait [Musculoskeletal - Swelling] : no joint swelling seen [Skin Color & Pigmentation] : normal skin color and pigmentation [Skin Turgor] : normal skin turgor [] : no rash [Skin Lesions] : no skin lesions [Cranial Nerves] : cranial nerves 2-12 were intact [Sensation] : the sensory exam was normal to light touch and pinprick [No Focal Deficits] : no focal deficits [Impaired Insight] : insight and judgment were intact [Affect] : the affect was normal [Mood] : the mood was normal

## 2022-08-01 NOTE — HISTORY OF PRESENT ILLNESS
[FreeTextEntry1] :  Mr. JIMMY BLAND is a 71 year-old man with a PMH of long-standing DM and HTN who [facility] for the management of Stage 5 CKD. \par \par \par Kidney history:\par Mr. BLAND has a baseline serum creatinine of 3.3mg/dL, with a corresponding eGFR of 20-22 ml/min/1.73m2.\par He was seeing Dr. Mcwilliams in Nov 21 and Feb 22. Dr. Mcwilliams had attributed the cause of CKD to Diabetes. No biopsy was done. He was also told that he had proteinuria. He has to switch nephrologist because of insurance issues. \par \par He denies having nausea/vomiting/diarrhea. He has a good appetite. He has frothy urine, but denies hematuria, or dysuria. He also has nocturia, but no hesitancy. He denies shortness of breath, chest pain, headache, edema. No hand tremors. He  denies skin rash, joint pains or hair loss. He takes ibuprofen 400mg for headache/ back pain once every 2-3 months. \par No family history of kidney disease.\par \par DM:\par Diagnosed in 2002\par Currently on insulin and pioglitazone. \par \par HTN:\par Diagnosed in 2000s\par Meals: mixed home cooked meal or eat out. [place], fast food. \par \par Nephrolithiasis:no\par \par Interval history:\par Poor appetite\par Currently, he denies having nausea/vomiting/metallic taste in His mouth. He denies hematuria, frothy urine or dysuria. He denies shortness of breath, chest pain, headache, edema. No hand tremors. He denies NSAID use or herbal supplements. \par \par BP at home - 130-164/60-70\par Am BP typically higher 160-180/70\par BP drops in the afternoon (130s/70s)\par - Currently, he is only taking hydralazine 25mg twice a day (7am, 6pm) instead of 3x a day. \par \par

## 2022-08-10 ENCOUNTER — NON-APPOINTMENT (OUTPATIENT)
Age: 72
End: 2022-08-10

## 2022-08-10 ENCOUNTER — APPOINTMENT (OUTPATIENT)
Dept: NEPHROLOGY | Facility: CLINIC | Age: 72
End: 2022-08-10

## 2022-08-10 ENCOUNTER — APPOINTMENT (OUTPATIENT)
Dept: TRANSPLANT | Facility: CLINIC | Age: 72
End: 2022-08-10

## 2022-08-10 ENCOUNTER — LABORATORY RESULT (OUTPATIENT)
Age: 72
End: 2022-08-10

## 2022-08-10 VITALS
TEMPERATURE: 97.7 F | OXYGEN SATURATION: 99 % | WEIGHT: 149 LBS | RESPIRATION RATE: 16 BRPM | DIASTOLIC BLOOD PRESSURE: 84 MMHG | BODY MASS INDEX: 22.58 KG/M2 | SYSTOLIC BLOOD PRESSURE: 220 MMHG | HEIGHT: 68 IN | HEART RATE: 57 BPM

## 2022-08-10 PROCEDURE — 99205 OFFICE O/P NEW HI 60 MIN: CPT

## 2022-08-10 PROCEDURE — 99072 ADDL SUPL MATRL&STAF TM PHE: CPT

## 2022-08-10 PROCEDURE — 99204 OFFICE O/P NEW MOD 45 MIN: CPT

## 2022-08-10 NOTE — PHYSICAL EXAM
[Well Developed] : well developed [Well Nourished] : well nourished [No Acute Distress] : no acute distress [Normocephalic] : normocephalic [Atraumatic] : atraumatic [Sclera Anicteric] : sclera anicteric [Good Dentition] : good dentition [Neck Supple] : neck supple [Clear to Auscultation] : clear to auscultation [Breathing Comfortably on RA] : breathing comfortably on room air [Normal Rate] : normal rate [Regular Rhythm] : regular rhythm [Murmur] : murmur [Systolic Murmur] : systolic murmur [Soft] : soft [Non-tender] : non-tender [No] : no fistula/graft [] : right dorsalis pedis palpable [Alert] : alert [Responds to Questions Appropriately] : responds to questions appropriately [Oriented] : oriented [Appropriate] : appropriate [FreeTextEntry1] : No carotid bruits [TextBox_34] : No ulcers or edema. [TextBox_86] : No adenopathy

## 2022-08-10 NOTE — PLAN
[FreeTextEntry1] : \par \par \par 1. Advanced CKD:  Mr. JIMMY BLAND  Is a good  candidate for kidney transplantation \par 2.Cardiac risk: needs an echo, stress test and cardiac evaluation \par 3. Cancer screening: needs a colonoscopy and check PSA\par 4. ID: Serology for acute and chronic viral infections. Screening for latent TB\par 5. Imaging: Renal/abdominal /chest /Iliac imaging\par 6.Diabetes Mellitus: check HbA1c \par \par I have personally discussed the risks and benefits of transplantation and patient attended transplant education class where the following was disclosed:\par  \par Reviewed factors affecting survival and morbidity while on dialysis, the transplant wait list and reviewed jin-operative and long-term risk factors affecting outcome in kidney transplantation. \par  \par One year SRTR outcomes for national and [facility] were discussed in regards to patient survival and graft survival after transplantation. \par  \par Details of transplant surgery, including complications were discussed.\par Immunosuppression and complications including infection including life threatening sepsis and opportunistic infections, malignancy and new onset diabetes were discussed. \par  \par Benefits of live donor transplantation as well as variability in wait times across regions and multiple listing were discussed. \par KDPI >85% and PHS high risk criteria donors were discussed. \par HCV kidney transplantation was discussed.\par  \par Will proceed with completing/ updating work up and listing for transplant/ live donor transplant once work up is reviewed and found to be acceptable by multidisciplinary listing committee.\par

## 2022-08-10 NOTE — HISTORY OF PRESENT ILLNESS
[Diabetes Mellitus] : Diabetes Mellitus [Insulin] : insulin treatment [Diabetes] : diabetes [Not Working] : Not working [80: Normal activity with effort; some signs or symptoms of disease.] : 80: Normal activity with effort; some signs or symptoms of disease.  [Previous Kidney Transplant] : no previous kidney transplant [Cardiac History] : no cardiac history [Blood Transfusion] : no prior blood transfusion [Hx of DVT/Thrombosis/Miscarriage] : no history of dvt/thrombosis/miscarriage [Retinopathy] : no retinopathy [Neuropathy] : no neuropathy [Annual Foot Exams] : no annual foot exams [Lower Extremity Ulcers] : no lower extremity ulcers [TextBox_16] : Pre-emptive [TextBox_20] : 2021 [TextBox_24] : 2010 [TextBox_28] : 2000 [TextBox_30] : 3 blocks [TextBox_39] : 2017 [FreeTextEntry3] : Previously worked in heavy construction [TextBox_42] : Born in [place]\par wife Pauline also present\par Hearing loss in right ear after fall at work\par Father:  - age 77 - DM\par Mother:  - age 87 - COVID\par Family history of renal disease: Denies\par Marital status: \par Children: 5 (children)\par Smoking: Denies\par Drinking: Denies\par Potential live donors: son\par

## 2022-08-10 NOTE — REVIEW OF SYSTEMS
[Fever] : no fever [Chills] : no chills [Fatigue] : no fatigue [Night Sweats] : no night sweats [Recent Weight Gain (___ Lbs)] : no recent weight gain [Recent Weight Loss (___ Lbs)] : recent [unfilled] ~Ulb weight loss [Sore throat] : no sore throat [Pain/Stiffness] : no pain/stiffness [Rhinorrhea] : no rhinorrhea [Trauma] : no trauma [Sclera anicteric] : sclera anicteric [Double vision] : no double vision [Blurred Vision] : no blurred vision [Eye Pain] : no eye pain [Wears glasses] : wears glasses [Chest Pain] : no chest pain [Palpitations] : no palpitations [Can Walk (___ Blocks)] : can walk [unfilled] blocks [SOB] : no shortness of breath [Wheezing] : no wheezing [Cough] : no cough [Dyspnea on Exertion] : no dyspnea on exertion [Pleuritic Chest Pain] : no pleuritic chest pain [Abdominal Pain] : no abdominal pain [Nausea] : no nausea [Constipation] : no constipation [Diarrhea] : diarrhea [Vomiting] : no vomiting [Dysuria] : no dysuria [Frequency] : no frequency [Urgency] : no urinary urgency [Incontinence] : no incontinence [Hematuria] : no hematuria [UTI] : no UTI [Urine Output: ____] : Urine Output: [unfilled] [Joint Pain] : no joint pain [Joint Stiffness] : no joint stiffness [Muscle Pain] : no muscle pain [Muscle Weakness] : no muscle weakness [Tattoos] : no tattoos [Itching] : itching [Skin Rash] : no skin rash [Hair Changes] : hair changes [Headache] : no headache [Dizziness] : no dizziness [Fainting] : no fainting [Confusion] : no confusion [Memory Loss] : no memory loss [Unsteady Gait] : steady gait [Seizures] : no seizures [Suicidal] : not suicidal [Hallucinations] : no hallucinations [Anxiety] : no anxiety [Depression] : no depression [Anemia] : anemia [Adenopathy] : no adenopathy [Easy Bleeding] : no easy bleeding [Easy Bruising] : no easy bruising

## 2022-08-10 NOTE — REASON FOR VISIT
[Initial] : an initial visit for [Kidney Transplant Evaluation] : kidney transplant evaluation [FreeTextEntry2] : Amrita Minor MD

## 2022-08-10 NOTE — PLAN
[We should proceed with our protocol for kidney transplantation evaluation.] : We should proceed with our protocol for kidney transplantation evaluation. [TextBox_6] : CT of abdomen and pelvis with NO IV contrast - to outline vessels

## 2022-08-10 NOTE — HISTORY OF PRESENT ILLNESS
[TextBox_42] : JIMMY BLAND is a 71 year old male who presents for kidney transplant evaluation. \par Cause of advanced CKD : Presumably secondary to DM , no biopsy done.\par Nephrologist: Dr Crowe \par Preemptive candidate \par \par Other PMH :\par Cardiac - HTN X 22 years. No h/o  MI , CHF, CAD\par Pulmonary- no h/o COPD, Asthma\par Infections- h/o TB, HIV, Hepatitis  \par Heme- no h/o malignancy or bleeding disorders.No DVT/PE\par Endo- DM, Diagnosed in 2002, on insulin and pioglitazone. No retinopathy . no Thyroid disease\par Neuro- no h/o CVA or seizures. \par Psych-no suicidal ideation, depression or anxiety. \par Sensitization events- no previous blood transfusions or previous transplant\par No infections or hospitalizations in the last 6 months \par \par Surgical h/o : none \par Functional status: reports excellent good functional status and can walk >10 blocks with no difficulty. \par Social history: lives with wife. non smoker, no alcohol or illicit drug use. \par Family history:  no family h/o kidney disease or malignancy. \par \par \par

## 2022-08-10 NOTE — CONSULT LETTER
[Dear  ___] : Dear  [unfilled], [Consult Letter:] : I had the pleasure of evaluating your patient, [unfilled]. [Please see my note below.] : Please see my note below. [Consult Closing:] : Thank you very much for allowing me to participate in the care of this patient.  If you have any questions, please do not hesitate to contact me. [Sincerely,] : Sincerely, [FreeTextEntry2] : Amrita Minor MD [FreeTextEntry3] : Kg

## 2022-08-10 NOTE — ASSESSMENT
[Good candidate] : a good candidate. We should proceed with our protocol for evaluation for kidney transplantation. [FreeTextEntry1] : Risk factors include:\par - diabetes\par - age

## 2022-08-11 LAB
ABO + RH PNL BLD: NORMAL
ABO + RH PNL BLD: NORMAL
ALBUMIN SERPL ELPH-MCNC: 4.2 G/DL
ALP BLD-CCNC: 84 U/L
ALT SERPL-CCNC: 7 U/L
ANION GAP SERPL CALC-SCNC: 14 MMOL/L
APPEARANCE: CLEAR
AST SERPL-CCNC: 10 U/L
BACTERIA: NEGATIVE
BASOPHILS # BLD AUTO: 0.06 K/UL
BASOPHILS NFR BLD AUTO: 0.7 %
BILIRUB SERPL-MCNC: 0.3 MG/DL
BILIRUBIN URINE: NEGATIVE
BLOOD URINE: NEGATIVE
BUN SERPL-MCNC: 52 MG/DL
C PEPTIDE SERPL-MCNC: 6.6 NG/ML
CALCIUM SERPL-MCNC: 9.2 MG/DL
CHLORIDE SERPL-SCNC: 106 MMOL/L
CMV IGG SERPL QL: 1.7 U/ML
CMV IGG SERPL-IMP: POSITIVE
CO2 SERPL-SCNC: 18 MMOL/L
COLOR: NORMAL
COVID-19 SPIKE DOMAIN ANTIBODY INTERPRETATION: POSITIVE
CREAT SERPL-MCNC: 4.72 MG/DL
CREAT SPEC-SCNC: 109 MG/DL
CREAT/PROT UR: 6 RATIO
EBV EA AB SER IA-ACNC: <5 U/ML
EBV EA AB TITR SER IF: POSITIVE
EBV EA IGG SER QL IA: 411 U/ML
EBV EA IGG SER-ACNC: NEGATIVE
EBV EA IGM SER IA-ACNC: NEGATIVE
EBV PATRN SPEC IB-IMP: NORMAL
EBV VCA IGG SER IA-ACNC: 499 U/ML
EBV VCA IGM SER QL IA: <10 U/ML
EGFR: 13 ML/MIN/1.73M2
EOSINOPHIL # BLD AUTO: 0.42 K/UL
EOSINOPHIL NFR BLD AUTO: 4.7 %
EPSTEIN-BARR VIRUS CAPSID ANTIGEN IGG: POSITIVE
ESTIMATED AVERAGE GLUCOSE: 237 MG/DL
GLUCOSE QUALITATIVE U: ABNORMAL
GLUCOSE SERPL-MCNC: 134 MG/DL
HBA1C MFR BLD HPLC: 9.9 %
HBV CORE IGG+IGM SER QL: NONREACTIVE
HBV SURFACE AB SER QL: NONREACTIVE
HBV SURFACE AB SERPL IA-ACNC: <3 MIU/ML
HBV SURFACE AG SER QL: NONREACTIVE
HCT VFR BLD CALC: 28.8 %
HCV AB SER QL: NONREACTIVE
HCV S/CO RATIO: 0.11 S/CO
HEPATITIS A IGG ANTIBODY: REACTIVE
HGB BLD-MCNC: 8.3 G/DL
HIV1+2 AB SPEC QL IA.RAPID: NONREACTIVE
HSV 1+2 IGG SER IA-IMP: NEGATIVE
HSV 1+2 IGG SER IA-IMP: POSITIVE
HSV1 IGG SER QL: 27.7 INDEX
HSV2 IGG SER QL: 0.08 INDEX
HYALINE CASTS: 2 /LPF
IMM GRANULOCYTES NFR BLD AUTO: 0.3 %
KETONES URINE: NEGATIVE
LEUKOCYTE ESTERASE URINE: NEGATIVE
LYMPHOCYTES # BLD AUTO: 1.86 K/UL
LYMPHOCYTES NFR BLD AUTO: 20.6 %
MAGNESIUM SERPL-MCNC: 2.7 MG/DL
MAN DIFF?: NORMAL
MCHC RBC-ENTMCNC: 19.4 PG
MCHC RBC-ENTMCNC: 28.8 GM/DL
MCV RBC AUTO: 67.4 FL
MICROSCOPIC-UA: NORMAL
MONOCYTES # BLD AUTO: 0.64 K/UL
MONOCYTES NFR BLD AUTO: 7.1 %
NEUTROPHILS # BLD AUTO: 6.02 K/UL
NEUTROPHILS NFR BLD AUTO: 66.6 %
NITRITE URINE: NEGATIVE
PH URINE: 6.5
PHOSPHATE SERPL-MCNC: 4.3 MG/DL
PLATELET # BLD AUTO: 261 K/UL
POTASSIUM SERPL-SCNC: 6 MMOL/L
PROT SERPL-MCNC: 6.9 G/DL
PROT UR-MCNC: 655 MG/DL
PROTEIN URINE: ABNORMAL
PSA SERPL-MCNC: 2.58 NG/ML
RBC # BLD: 4.27 M/UL
RBC # FLD: 16.8 %
RED BLOOD CELLS URINE: 5 /HPF
RUBV IGG FLD-ACNC: 15.8 INDEX
RUBV IGG SER-IMP: POSITIVE
SARS-COV-2 AB SERPL IA-ACNC: >250 U/ML
SODIUM SERPL-SCNC: 138 MMOL/L
SPECIFIC GRAVITY URINE: 1.02
SQUAMOUS EPITHELIAL CELLS: 1 /HPF
T GONDII AB SER-IMP: NEGATIVE
T GONDII IGG SER QL: <3 IU/ML
T PALLIDUM AB SER QL IA: NEGATIVE
URATE SERPL-MCNC: 6.9 MG/DL
UROBILINOGEN URINE: NORMAL
VZV AB TITR SER: POSITIVE
VZV IGG SER IF-ACNC: 494 INDEX
WBC # FLD AUTO: 9.03 K/UL
WHITE BLOOD CELLS URINE: 2 /HPF

## 2022-08-17 LAB
M TB IFN-G BLD-IMP: NEGATIVE
QUANTIFERON TB PLUS MITOGEN MINUS NIL: 3.31 IU/ML
QUANTIFERON TB PLUS NIL: 0.01 IU/ML
QUANTIFERON TB PLUS TB1 MINUS NIL: 0.01 IU/ML
QUANTIFERON TB PLUS TB2 MINUS NIL: 0 IU/ML

## 2022-08-19 RX ORDER — HYDRALAZINE HYDROCHLORIDE 25 MG/1
25 TABLET ORAL 3 TIMES DAILY
Qty: 270 | Refills: 3 | Status: DISCONTINUED | COMMUNITY
Start: 2022-07-07 | End: 2022-08-19

## 2022-08-23 ENCOUNTER — EMERGENCY (EMERGENCY)
Facility: HOSPITAL | Age: 72
LOS: 1 days | Discharge: HOME | End: 2022-08-23
Attending: EMERGENCY MEDICINE | Admitting: EMERGENCY MEDICINE

## 2022-08-23 VITALS
TEMPERATURE: 98 F | DIASTOLIC BLOOD PRESSURE: 75 MMHG | HEIGHT: 68 IN | OXYGEN SATURATION: 100 % | SYSTOLIC BLOOD PRESSURE: 265 MMHG | HEART RATE: 61 BPM | RESPIRATION RATE: 22 BRPM

## 2022-08-23 PROCEDURE — 99284 EMERGENCY DEPT VISIT MOD MDM: CPT

## 2022-08-23 RX ORDER — AMLODIPINE BESYLATE 2.5 MG/1
10 TABLET ORAL ONCE
Refills: 0 | Status: COMPLETED | OUTPATIENT
Start: 2022-08-23 | End: 2022-08-23

## 2022-08-23 RX ADMIN — AMLODIPINE BESYLATE 10 MILLIGRAM(S): 2.5 TABLET ORAL at 23:45

## 2022-08-23 NOTE — ED ADULT TRIAGE NOTE - CHIEF COMPLAINT QUOTE
Pt has PMH of DM and HTN and he has elevated blood pressure . Pt is on Hydralazine 25 mg three times a day. BP is 265/75 in triage. Pt denies any symptoms. Has no CP or SOB or dizziness or headache or nausea. His BP meds were adjusted a month ago. Pt says he is on the list for kidney transplant but he is not sure why and he is not on dialysis. Pt is on Novolin.

## 2022-08-23 NOTE — ED PROVIDER NOTE - NSFOLLOWUPINSTRUCTIONS_ED_ALL_ED_FT
High blood pressure (hypertension) is when the force of blood pumping through the arteries is too strong. The arteries are the blood vessels that carry blood from the heart throughout the body. Hypertension forces the heart to work harder to pump blood and may cause arteries to become narrow or stiff. Untreated or uncontrolled hypertension can cause a heart attack, heart failure, a stroke, kidney disease, and other problems.    A blood pressure reading consists of a higher number over a lower number. Ideally, your blood pressure should be below 120/80. The first ("top") number is called the systolic pressure. It is a measure of the pressure in your arteries as your heart beats. The second ("bottom") number is called the diastolic pressure. It is a measure of the pressure in your arteries as the heart relaxes.    What are the causes?    The exact cause of this condition is not known. There are some conditions that result in or are related to high blood pressure.    What increases the risk?    Some risk factors for high blood pressure are under your control. The following factors may make you more likely to develop this condition:  •Smoking.    •Having type 2 diabetes mellitus, high cholesterol, or both.    •Not getting enough exercise or physical activity.    •Being overweight.    •Having too much fat, sugar, calories, or salt (sodium) in your diet.    •Drinking too much alcohol.    Some risk factors for high blood pressure may be difficult or impossible to change. Some of these factors include:  •Having chronic kidney disease.    •Having a family history of high blood pressure.    •Age. Risk increases with age.    •Race. You may be at higher risk if you are .    •Gender. Men are at higher risk than women before age 45. After age 65, women are at higher risk than men.    •Having obstructive sleep apnea.    •Stress.    What are the signs or symptoms?    High blood pressure may not cause symptoms. Very high blood pressure (hypertensive crisis) may cause:  •Headache.    •Anxiety.    •Shortness of breath.    •Nosebleed.    •Nausea and vomiting.    •Vision changes.    •Severe chest pain.    •Seizures.    How is this diagnosed?    This condition is diagnosed by measuring your blood pressure while you are seated, with your arm resting on a flat surface, your legs uncrossed, and your feet flat on the floor. The cuff of the blood pressure monitor will be placed directly against the skin of your upper arm at the level of your heart. It should be measured at least twice using the same arm. Certain conditions can cause a difference in blood pressure between your right and left arms.    Certain factors can cause blood pressure readings to be lower or higher than normal for a short period of time:  •When your blood pressure is higher when you are in a health care provider's office than when you are at home, this is called white coat hypertension. Most people with this condition do not need medicines.    •When your blood pressure is higher at home than when you are in a health care provider's office, this is called masked hypertension. Most people with this condition may need medicines to control blood pressure.    If you have a high blood pressure reading during one visit or you have normal blood pressure with other risk factors, you may be asked to:  •Return on a different day to have your blood pressure checked again.    •Monitor your blood pressure at home for 1 week or longer.    If you are diagnosed with hypertension, you may have other blood or imaging tests to help your health care provider understand your overall risk for other conditions.    How is this treated?    This condition is treated by making healthy lifestyle changes, such as eating healthy foods, exercising more, and reducing your alcohol intake. Your health care provider may prescribe medicine if lifestyle changes are not enough to get your blood pressure under control, and if:  •Your systolic blood pressure is above 130.    •Your diastolic blood pressure is above 80.    Your personal target blood pressure may vary depending on your medical conditions, your age, and other factors.    Follow these instructions at home:    Eating and drinking    •Eat a diet that is high in fiber and potassium, and low in sodium, added sugar, and fat. An example eating plan is called the DASH (Dietary Approaches to Stop Hypertension) diet. To eat this way:  •Eat plenty of fresh fruits and vegetables. Try to fill one half of your plate at each meal with fruits and vegetables.    •Eat whole grains, such as whole-wheat pasta, brown rice, or whole-grain bread. Fill about one fourth of your plate with whole grains.    •Eat or drink low-fat dairy products, such as skim milk or low-fat yogurt.    •Avoid fatty cuts of meat, processed or cured meats, and poultry with skin. Fill about one fourth of your plate with lean proteins, such as fish, chicken without skin, beans, eggs, or tofu.    •Avoid pre-made and processed foods. These tend to be higher in sodium, added sugar, and fat.    •Reduce your daily sodium intake. Most people with hypertension should eat less than 1,500 mg of sodium a day.    • Do not drink alcohol if:  •Your health care provider tells you not to drink.    •You are pregnant, may be pregnant, or are planning to become pregnant.    •If you drink alcohol:•Limit how much you use to:  •0–1 drink a day for women.    •0–2 drinks a day for men.    •Be aware of how much alcohol is in your drink. In the [place] one drink equals one 12 oz bottle of beer (355 mL), one 5 oz glass of wine (148 mL), or one 1½ oz glass of hard liquor (44 mL).    Lifestyle      •Work with your health care provider to maintain a healthy body weight or to lose weight. Ask what an ideal weight is for you.    •Get at least 30 minutes of exercise most days of the week. Activities may include walking, swimming, or biking.    •Include exercise to strengthen your muscles (resistance exercise), such as Pilates or lifting weights, as part of your weekly exercise routine. Try to do these types of exercises for 30 minutes at least 3 days a week.    • Do not use any products that contain nicotine or tobacco, such as cigarettes, e-cigarettes, and chewing tobacco. If you need help quitting, ask your health care provider.    •Monitor your blood pressure at home as told by your health care provider.    •Keep all follow-up visits as told by your health care provider. This is important.    Medicines     •Take over-the-counter and prescription medicines only as told by your health care provider. Follow directions carefully. Blood pressure medicines must be taken as prescribed.    • Do not skip doses of blood pressure medicine. Doing this puts you at risk for problems and can make the medicine less effective.    •Ask your health care provider about side effects or reactions to medicines that you should watch for.    Contact a health care provider if you:    •Think you are having a reaction to a medicine you are taking.    •Have headaches that keep coming back (recurring).    •Feel dizzy.    •Have swelling in your ankles.    •Have trouble with your vision.    Get help right away if you:    •Develop a severe headache or confusion.    •Have unusual weakness or numbness.    •Feel faint.    •Have severe pain in your chest or abdomen.    •Vomit repeatedly.    •Have trouble breathing.

## 2022-08-23 NOTE — ED PROVIDER NOTE - PHYSICAL EXAMINATION
Vitals: I have reviewed the patients vital signs  General: nontoxic appearing  HEENT: Atraumatic, normocephalic, airway patent  Eyes: EOMI, tracking appropriately  Neck: no tracheal deviation  Chest/Lungs: no trauma, symmetric chest rise, speaking in complete sentences,  no resp distress  Heart: skin and extremities well perfused, regular rate and rhythm  Neuro: A+Ox3, ambulating without difficulty, appears non focal  MSK: strength at baseline in all extremities, no muscle wasting or atrophy  Skin: no cyanosis, no jaundice

## 2022-08-23 NOTE — ED PROVIDER NOTE - PATIENT PORTAL LINK FT
You can access the FollowMyHealth Patient Portal offered by [facility] by registering at the following website: [email or web address]. By joining [facility]’s FollowMyHealth portal, you will also be able to view your health information using other applications (apps) compatible with our system.

## 2022-08-23 NOTE — ED PROVIDER NOTE - CLINICAL SUMMARY MEDICAL DECISION MAKING FREE TEXT BOX
pt with HTN at home with no other symptoms.  Not on an ideal regimen at this time.  Will treat with new HTN here with an ideal 20% drop to ensure no hypotension relative to what he is used to.  Will need better outpatient BP management.  *The above represents an initial assessment/impression. Please refer to progress notes for potential changes in patient clinical course*

## 2022-08-23 NOTE — ED PROVIDER NOTE - OBJECTIVE STATEMENT
70 yo with PMH HTN poorly controlled and CKD presenting with elevated BP at home.  Has been on hydralizine from his PMD and over the last two weeks has been on a dose of 100mg TID.  BP has routinely been elevated to over 200 - 200 SBP despite taking medications.  He has no other symptoms.  No CP SOB HA or visual changes.

## 2022-08-23 NOTE — ED ADULT NURSE NOTE - OBJECTIVE STATEMENT
pt received in room 26. pt is AxOx4 and ambulatory. pt presents to ED with elevated BP. pt states his daughter was taking his BP and noticed it was high. pt BP was 231/78. Pt has PMH of HTN and DM, FS done in triage. pt denies chest pain, sob, headaches, dizziness, n/v/d, changes in vision and trouble/burning when urinating. pt RR are even and unlabored, skin is dry and intact, pt is NSR on cardiac monitor. pt medicated as ordered. Will continue to monitor.

## 2022-08-23 NOTE — ED ADULT TRIAGE NOTE - NS ED TRIAGE AVPU SCALE
No-Patient/Caregiver offered and refused free interpretation services. Alert-The patient is alert, awake and responds to voice. The patient is oriented to time, place, and person. The triage nurse is able to obtain subjective information.

## 2022-08-24 VITALS
SYSTOLIC BLOOD PRESSURE: 189 MMHG | OXYGEN SATURATION: 100 % | RESPIRATION RATE: 19 BRPM | DIASTOLIC BLOOD PRESSURE: 81 MMHG | TEMPERATURE: 98 F

## 2022-08-24 RX ORDER — AMLODIPINE BESYLATE 2.5 MG/1
1 TABLET ORAL
Qty: 14 | Refills: 0
Start: 2022-08-24

## 2022-08-25 ENCOUNTER — INPATIENT (INPATIENT)
Facility: HOSPITAL | Age: 72
LOS: 7 days | Discharge: HOME | End: 2022-09-02
Attending: STUDENT IN AN ORGANIZED HEALTH CARE EDUCATION/TRAINING PROGRAM | Admitting: STUDENT IN AN ORGANIZED HEALTH CARE EDUCATION/TRAINING PROGRAM

## 2022-08-25 ENCOUNTER — APPOINTMENT (OUTPATIENT)
Dept: INTERNAL MEDICINE | Facility: CLINIC | Age: 72
End: 2022-08-25

## 2022-08-25 ENCOUNTER — NON-APPOINTMENT (OUTPATIENT)
Age: 72
End: 2022-08-25

## 2022-08-25 VITALS — DIASTOLIC BLOOD PRESSURE: 80 MMHG | SYSTOLIC BLOOD PRESSURE: 180 MMHG

## 2022-08-25 VITALS
SYSTOLIC BLOOD PRESSURE: 185 MMHG | RESPIRATION RATE: 16 BRPM | HEART RATE: 58 BPM | OXYGEN SATURATION: 99 % | DIASTOLIC BLOOD PRESSURE: 65 MMHG | TEMPERATURE: 98 F | HEIGHT: 68 IN

## 2022-08-25 VITALS
OXYGEN SATURATION: 99 % | HEART RATE: 65 BPM | BODY MASS INDEX: 22.58 KG/M2 | TEMPERATURE: 98 F | HEIGHT: 68 IN | WEIGHT: 149 LBS

## 2022-08-25 DIAGNOSIS — E87.5 HYPERKALEMIA: ICD-10-CM

## 2022-08-25 LAB
ALBUMIN SERPL ELPH-MCNC: 4 G/DL — SIGNIFICANT CHANGE UP (ref 3.3–5)
ALP SERPL-CCNC: 90 U/L — SIGNIFICANT CHANGE UP (ref 40–120)
ALT FLD-CCNC: 14 U/L — SIGNIFICANT CHANGE UP (ref 4–41)
ANION GAP SERPL CALC-SCNC: 12 MMOL/L — SIGNIFICANT CHANGE UP (ref 7–14)
AST SERPL-CCNC: 12 U/L — SIGNIFICANT CHANGE UP (ref 4–40)
BASE EXCESS BLDV CALC-SCNC: -5.9 MMOL/L — LOW (ref -2–3)
BASOPHILS # BLD AUTO: 0.04 K/UL — SIGNIFICANT CHANGE UP (ref 0–0.2)
BASOPHILS NFR BLD AUTO: 0.6 % — SIGNIFICANT CHANGE UP (ref 0–2)
BILIRUB SERPL-MCNC: 0.3 MG/DL — SIGNIFICANT CHANGE UP (ref 0.2–1.2)
BLOOD GAS VENOUS COMPREHENSIVE RESULT: SIGNIFICANT CHANGE UP
BUN SERPL-MCNC: 54 MG/DL — HIGH (ref 7–23)
BUN SERPL-MCNC: 57 MG/DL — HIGH (ref 7–23)
CALCIUM SERPL-MCNC: 8.9 MG/DL — SIGNIFICANT CHANGE UP (ref 8.4–10.5)
CALCIUM SERPL-MCNC: 8.9 MG/DL — SIGNIFICANT CHANGE UP (ref 8.4–10.5)
CHLORIDE BLDV-SCNC: 110 MMOL/L — HIGH (ref 96–108)
CHLORIDE SERPL-SCNC: 109 MMOL/L — HIGH (ref 98–107)
CO2 BLDV-SCNC: 22.5 MMOL/L — SIGNIFICANT CHANGE UP (ref 22–26)
CO2 SERPL-SCNC: 18 MMOL/L — LOW (ref 22–31)
CO2 SERPL-SCNC: 19 MMOL/L — LOW (ref 22–31)
CREAT SERPL-MCNC: 4.81 MG/DL — HIGH (ref 0.5–1.3)
CREAT SERPL-MCNC: 5.15 MG/DL — HIGH (ref 0.5–1.3)
EGFR: 11 ML/MIN/1.73M2 — LOW
EGFR: 12 ML/MIN/1.73M2 — LOW
EOSINOPHIL # BLD AUTO: 0.42 K/UL — SIGNIFICANT CHANGE UP (ref 0–0.5)
EOSINOPHIL NFR BLD AUTO: 6.1 % — HIGH (ref 0–6)
FLUAV AG NPH QL: SIGNIFICANT CHANGE UP
FLUBV AG NPH QL: SIGNIFICANT CHANGE UP
GAS PNL BLDV: 138 MMOL/L — SIGNIFICANT CHANGE UP (ref 136–145)
GAS PNL BLDV: SIGNIFICANT CHANGE UP
GLUCOSE BLDV-MCNC: 113 MG/DL — HIGH (ref 70–99)
GLUCOSE SERPL-MCNC: 114 MG/DL — HIGH (ref 70–99)
GLUCOSE SERPL-MCNC: 57 MG/DL — LOW (ref 70–99)
HCO3 BLDV-SCNC: 21 MMOL/L — LOW (ref 22–29)
HCT VFR BLD CALC: 23.2 % — LOW (ref 39–50)
HCT VFR BLDA CALC: 22 % — LOW (ref 39–51)
HGB BLD CALC-MCNC: 7.3 G/DL — LOW (ref 13–17)
HGB BLD-MCNC: 7.2 G/DL — LOW (ref 13–17)
IANC: 4.27 K/UL — SIGNIFICANT CHANGE UP (ref 1.8–7.4)
IMM GRANULOCYTES NFR BLD AUTO: 0.3 % — SIGNIFICANT CHANGE UP (ref 0–1.5)
LACTATE BLDV-MCNC: 1.3 MMOL/L — SIGNIFICANT CHANGE UP (ref 0.5–2)
LYMPHOCYTES # BLD AUTO: 1.55 K/UL — SIGNIFICANT CHANGE UP (ref 1–3.3)
LYMPHOCYTES # BLD AUTO: 22.5 % — SIGNIFICANT CHANGE UP (ref 13–44)
MAGNESIUM SERPL-MCNC: 2.3 MG/DL — SIGNIFICANT CHANGE UP (ref 1.6–2.6)
MCHC RBC-ENTMCNC: 20.3 PG — LOW (ref 27–34)
MCHC RBC-ENTMCNC: 31 GM/DL — LOW (ref 32–36)
MCV RBC AUTO: 65.4 FL — LOW (ref 80–100)
MONOCYTES # BLD AUTO: 0.59 K/UL — SIGNIFICANT CHANGE UP (ref 0–0.9)
MONOCYTES NFR BLD AUTO: 8.6 % — SIGNIFICANT CHANGE UP (ref 2–14)
NEUTROPHILS # BLD AUTO: 4.27 K/UL — SIGNIFICANT CHANGE UP (ref 1.8–7.4)
NEUTROPHILS NFR BLD AUTO: 61.9 % — SIGNIFICANT CHANGE UP (ref 43–77)
NRBC # BLD: 0 /100 WBCS — SIGNIFICANT CHANGE UP (ref 0–0)
NRBC # FLD: 0 K/UL — SIGNIFICANT CHANGE UP (ref 0–0)
PCO2 BLDV: 48 MMHG — SIGNIFICANT CHANGE UP (ref 42–55)
PH BLDV: 7.25 — LOW (ref 7.32–7.43)
PHOSPHATE SERPL-MCNC: 4.8 MG/DL — HIGH (ref 2.5–4.5)
PLATELET # BLD AUTO: 216 K/UL — SIGNIFICANT CHANGE UP (ref 150–400)
PO2 BLDV: 29 MMHG — SIGNIFICANT CHANGE UP
POTASSIUM BLDV-SCNC: 5.7 MMOL/L — HIGH (ref 3.5–5.1)
POTASSIUM SERPL-MCNC: 5.8 MMOL/L — HIGH (ref 3.5–5.3)
POTASSIUM SERPL-SCNC: 5.8 MMOL/L — HIGH (ref 3.5–5.3)
PROT SERPL-MCNC: 6.7 G/DL — SIGNIFICANT CHANGE UP (ref 6–8.3)
RBC # BLD: 3.55 M/UL — LOW (ref 4.2–5.8)
RBC # FLD: 15.5 % — HIGH (ref 10.3–14.5)
RSV RNA NPH QL NAA+NON-PROBE: SIGNIFICANT CHANGE UP
SAO2 % BLDV: 38.8 % — SIGNIFICANT CHANGE UP
SARS-COV-2 RNA SPEC QL NAA+PROBE: SIGNIFICANT CHANGE UP
SODIUM SERPL-SCNC: 140 MMOL/L — SIGNIFICANT CHANGE UP (ref 135–145)
WBC # BLD: 6.89 K/UL — SIGNIFICANT CHANGE UP (ref 3.8–10.5)
WBC # FLD AUTO: 6.89 K/UL — SIGNIFICANT CHANGE UP (ref 3.8–10.5)

## 2022-08-25 PROCEDURE — 93000 ELECTROCARDIOGRAM COMPLETE: CPT

## 2022-08-25 PROCEDURE — 99285 EMERGENCY DEPT VISIT HI MDM: CPT | Mod: 25

## 2022-08-25 PROCEDURE — 99223 1ST HOSP IP/OBS HIGH 75: CPT

## 2022-08-25 PROCEDURE — 99214 OFFICE O/P EST MOD 30 MIN: CPT | Mod: 25

## 2022-08-25 PROCEDURE — 93010 ELECTROCARDIOGRAM REPORT: CPT

## 2022-08-25 RX ORDER — SODIUM ZIRCONIUM CYCLOSILICATE 10 G/10G
10 POWDER, FOR SUSPENSION ORAL ONCE
Refills: 0 | Status: COMPLETED | OUTPATIENT
Start: 2022-08-25 | End: 2022-08-25

## 2022-08-25 RX ORDER — AMLODIPINE BESYLATE 2.5 MG/1
0 TABLET ORAL
Qty: 0 | Refills: 0 | DISCHARGE

## 2022-08-25 RX ORDER — LOSARTAN POTASSIUM 100 MG/1
0 TABLET, FILM COATED ORAL
Qty: 0 | Refills: 1 | DISCHARGE

## 2022-08-25 RX ORDER — CALCIUM GLUCONATE 100 MG/ML
2 VIAL (ML) INTRAVENOUS ONCE
Refills: 0 | Status: COMPLETED | OUTPATIENT
Start: 2022-08-25 | End: 2022-08-25

## 2022-08-25 RX ORDER — DEXTROSE 50 % IN WATER 50 %
25 SYRINGE (ML) INTRAVENOUS ONCE
Refills: 0 | Status: COMPLETED | OUTPATIENT
Start: 2022-08-25 | End: 2022-08-25

## 2022-08-25 RX ORDER — INSULIN HUMAN 100 [IU]/ML
5 INJECTION, SOLUTION SUBCUTANEOUS ONCE
Refills: 0 | Status: COMPLETED | OUTPATIENT
Start: 2022-08-25 | End: 2022-08-25

## 2022-08-25 RX ORDER — ACETAMINOPHEN 500 MG
650 TABLET ORAL EVERY 6 HOURS
Refills: 0 | Status: DISCONTINUED | OUTPATIENT
Start: 2022-08-25 | End: 2022-09-02

## 2022-08-25 RX ADMIN — Medication 25 MILLILITER(S): at 21:31

## 2022-08-25 RX ADMIN — SODIUM ZIRCONIUM CYCLOSILICATE 10 GRAM(S): 10 POWDER, FOR SUSPENSION ORAL at 21:31

## 2022-08-25 RX ADMIN — INSULIN HUMAN 5 UNIT(S): 100 INJECTION, SOLUTION SUBCUTANEOUS at 21:30

## 2022-08-25 RX ADMIN — Medication 200 GRAM(S): at 20:24

## 2022-08-25 NOTE — H&P ADULT - PROBLEM SELECTOR PLAN 2
Pt w/ SBPs in 200s. Unclear home regimen, but per superscripts likely includes amlodipine 10, hydralazine 100, lasix 40. Pt making great volumes of urine. Pt asymptomatic.   - Can c/w amlodipine, hydralazine.  - Pt may benefit from further diuresis with IV lasix  - can give IV lopressor for SBP >190 or symptomatic   - VS q4 Pt w/ SBPs in 200s. Unclear home regimen, but per superscripts likely includes amlodipine 10, hydralazine 100, lasix 40. Pt making great volumes of urine. Pt asymptomatic.   - Can c/w amlodipine, hydralazine.  - Will give IV lasix 40 trial   - if further hypertensive, can start labetalol 100  - VS q4

## 2022-08-25 NOTE — ED PROVIDER NOTE - ATTENDING CONTRIBUTION TO CARE
72 yo M hx CKD, DM2, HTN, sent in by PMD for abnormal EKG and elevated K on outpatient labs. Pt denies cp, sob, palpitations. Reports elevated BP at home. EKG with peaked T waves V3 and V4-- pt given calcium for cardiac stabilization. Exam wnl. Plan for repeat labs, hyperK cocktail, tba

## 2022-08-25 NOTE — ED ADULT TRIAGE NOTE - CHIEF COMPLAINT QUOTE
pt arrives sent by PCP for abnormal EKG. Pt was seen in ED 2 days ago for elevated BP, followed up with PCP today and was sent to ED for further evaluation. Denies CP SOB palpitations lightheadedness etc. Was told last blood work on 8/10 showed hyperkalemia. Pt on list for kidney transplant, not on dialysis. PMHx CKD, DM, HTN

## 2022-08-25 NOTE — ED ADULT NURSE REASSESSMENT NOTE - NS ED NURSE REASSESS COMMENT FT1
Patient received from previous shift. Patient resting in bed comfortably and offers no complaints. Patient is Hypertensive .MD is aware. No distress noted. Nursing care continues

## 2022-08-25 NOTE — H&P ADULT - PROBLEM SELECTOR PLAN 1
Pt w/ hyperkalemia to 5.8 with peaked T waves in the setting of progressive kidney disease with metabolic acidosis. Pt asymptomatic, not volume overload,   - s/p insulin, dextrose, and lokelma in ED  - s/p calcium gluconate  - trending BMP q8 and will correct electrolytes as needed.   - follows with Dr. Amriat Minor ([facility])  - no emergent need for HD at this time  - consult nephrology for HD evaluation

## 2022-08-25 NOTE — H&P ADULT - HISTORY OF PRESENT ILLNESS
70 y/o M with hx of CKD5 not on dialysis, DM, HTN, currently on renal transplant list, sent by PMD (Dr. Prachi Omer - [phone]) for "abnormal EKG" and elevated blood pressure.  During today's follow up visit with PMD, patient had peaked T-waves and with hx of previous K+ value 6.0 on 8/10, instructed to go to the ER.  Patient denies any symptoms including F/C/N/V, CP, palpitations, SOB, urinary symptoms, HA, dizziness, blurry vision, Abd pain, Pt still makes significant amount of urine. No LE edema, orthopnea. No recent IV contrast, no NSAID use, no decreased PO intake.     Attempted to reach the patient's wife Pauline Art several times for medication list via phone. Confirmed phone # with patient.

## 2022-08-25 NOTE — ED PROVIDER NOTE - PHYSICAL EXAMINATION
Gen: Patient is well-appearing, NAD, AAOx3, able to ambulate without assistance  HEENT: NCAT, EOMI, PEERLA, normal conjunctiva, tongue midline, oral mucosa moist  Lung: CTAB, no respiratory distress, no wheezes/rhonchi/rales B/L, speaking in full sentences  CV: RRR, no murmurs, rubs or gallops, distal pulses 2+ b/l  Abd: soft, NT, ND, no guarding, no rigidity, no rebound tenderness, no CVA tenderness   MSK: no visible deformities, ROM normal in UE/LE, no back TTP  Neuro: No focal sensory or motor deficits  Skin: Warm, well perfused, no rash, no leg swelling  Psych: normal affect, calm

## 2022-08-25 NOTE — H&P ADULT - NSHPLABSRESULTS_GEN_ALL_CORE
Labs: Personally reviewed                        7.2    6.89  )-----------( 216      ( 25 Aug 2022 19:35 )             23.2     08-25    140  |  109<H>  |  57<H>  ----------------------------<  114<H>  5.8<H>   |  19<L>  |  5.15<H>    Ca    8.9      25 Aug 2022 19:35  Phos  5.1     08-25  Mg     2.30     08-25    TPro  6.7  /  Alb  4.0  /  TBili  0.3  /  DBili  x   /  AST  12  /  ALT  14  /  AlkPhos  90  08-25    EKG: peaked T waves in V2,V3,V4

## 2022-08-25 NOTE — ED PROVIDER NOTE - CLINICAL SUMMARY MEDICAL DECISION MAKING FREE TEXT BOX
suspect hyperkalemia. Will get labs, Mg, Phos, calcium gluconate treatment, will touch base with PMD to coordinate care.

## 2022-08-25 NOTE — ED PROVIDER NOTE - CARE PLAN
1 Principal Discharge DX:	Hyperkalemia  Secondary Diagnosis:	Acute kidney injury superimposed on chronic kidney disease

## 2022-08-25 NOTE — ED ADULT NURSE NOTE - NSIMPLEMENTINTERV_GEN_ALL_ED
Implemented All Universal Safety Interventions:  [place] to call system. Call bell, personal items and telephone within reach. Instruct patient to call for assistance. Room bathroom lighting operational. Non-slip footwear when patient is off stretcher. Physically safe environment: no spills, clutter or unnecessary equipment. Stretcher in lowest position, wheels locked, appropriate side rails in place.

## 2022-08-25 NOTE — H&P ADULT - PROBLEM SELECTOR PLAN 3
Pt w/ progressive kidney disease, now with hyperkalemia and mild metabolic acidosis to 7.24, bicarb 21. SCr on admission 5.5, last known 4.72 on 8/10, w/o significant change in BUN from previous. More likely progression of CKD vs MARLEN on CKD  - Pt noted with vitamin D level 29.9, 2/22', can repeat parathyroid function panel   - No emergent need for HD  - Pt may need to initiate HD this admission  -Consult nephrology,  - treat electrolyte abnormalities as needed

## 2022-08-25 NOTE — H&P ADULT - PROBLEM SELECTOR PLAN 5
Pt with known anemia from renal failure/ anemia of chronic disease, with Hg 8.3/28 on 8/10 but now progressed 7.2/23.2.   - Transfuse for Hg <7 or symptomatic, obtain T+S  - no iron profile seen in recent workup, can send [facility] workup   - Pt may benefit from EPO initiation outpatient

## 2022-08-25 NOTE — ED ADULT NURSE NOTE - OBJECTIVE STATEMENT
pt received in rm 22 AAO x 3. pt with hx of HTN, CKD, DM. pt reports he was sent by PCP for elevated BP and potassium level of 6 on blood work on 8/10. pt denies sob, chest pain, n/v/d, fevers, chills, cough, h/a, vision changes, numbness or tingling. respirations even and unlabored. pt placed on CM. NSR. 20g iv placed to right ac. will continue to monitor.

## 2022-08-25 NOTE — H&P ADULT - PROBLEM SELECTOR PLAN 4
Pt at home on Novolin 8 units in AM and 8 units in PM. Recent outpatient A1c 9.9.   - Given hospitalization, can c/w novolin 6 BID, with ISS Pt at home on Novolin 8 units in AM and 8 units in PM. Recent outpatient A1c 9.9.   - Given hospitalization for hyperkalemia with suspected need for further insulin q8, will keep on ISS today.

## 2022-08-25 NOTE — H&P ADULT - NSICDXFAMILYHX_GEN_ALL_CORE_FT
FAMILY HISTORY:  Father  Still living? Unknown  FHx: diabetes mellitus, Age at diagnosis: Age Unknown    Aunt  Still living? Unknown  FHx: diabetes mellitus, Age at diagnosis: Age Unknown

## 2022-08-25 NOTE — H&P ADULT - ASSESSMENT
72 y/o M with hx of CKD5 not on dialysis, DM, HTN, currently on renal transplant list, sent by PMD  for peaked T waves on EKG and hypertension, found to be hyperkalemic with mild metabolic acidosis, admitted for management.

## 2022-08-25 NOTE — H&P ADULT - ATTENDING COMMENTS
Pt with HTN, DM, CKD p/w elevated BP in office and abnormal EKG.  Found to be hyperkalemic with worsening Cr.    Currently pt reports no headache, blurred vision, nausea, dyspnea, or CP.  On exam, pt in NAD, Heart RRR, lungs CTA B,  abd s/nt/nd BS normal.  no edema  labs reviewed showing improved K+ on repeat.  BP remains elevated  Will consult nephrology today  Trend BMP  give home BP medications, and if needed, will add labetalol

## 2022-08-25 NOTE — H&P ADULT - NSHPPHYSICALEXAM_GEN_ALL_CORE
Physical Exam:  T(F): 98.2 (08-25), Max: 98.2 (08-25)  HR: 57 (08-25) (57 - 64)  BP: 191/78 (08-25) (185/65 - 209/74)  RR: 16 (08-25)  SpO2: 100% (08-25)    GENERAL: No acute distress, well-developed  HEAD:  Atraumatic, Normocephalic  ENT: EOMI, PERRLA, conjunctiva and sclera clear, Neck supple, No JVD, moist mucosa  CHEST/LUNG: Clear to auscultation bilaterally; No wheeze, equal breath sounds bilaterally   BACK: No spinal tenderness  HEART: Regular rate and rhythm; No murmurs, rubs, or gallops  ABDOMEN: Soft, Nontender, Nondistended; Bowel sounds present  EXTREMITIES:  No clubbing, cyanosis, or edema  PSYCH: Nl behavior, nl affect  NEUROLOGY: AAOx3, non-focal, cranial nerves intact  SKIN: Normal color, No rashes or lesions

## 2022-08-25 NOTE — ED PROVIDER NOTE - PROGRESS NOTE DETAILS
Paty PGY2: Dr. Omer office contacted. Unable to reach Dr. Omer. Left call back number to . Paty PGY2: patient's HGB 8.3 and K 6.0 on 08/10. Paty PGY2: patient's nephrologist Dr. Amrita Minor was contacted on Teams. Unable to reach Dr. Minor. Left message on Teams. Dr. Minor office also contacted.

## 2022-08-25 NOTE — H&P ADULT - NSHPREVIEWOFSYSTEMS_GEN_ALL_CORE
REVIEW OF SYSTEMS:  CONSTITUTIONAL: No weakness, fevers or chills  EYES/ENT: No visual changes;  No dysphagia  NECK: No pain or stiffness  RESPIRATORY: No cough, wheezing, hemoptysis; No shortness of breath  CARDIOVASCULAR: No chest pain or palpitations; No lower extremity edema  GASTROINTESTINAL: No abdominal or epigastric pain. No nausea, vomiting, or hematemesis; No diarrhea or constipation. No melena or hematochezia.  BACK: No back pain  GENITOURINARY: No dysuria, frequency or hematuria  NEUROLOGICAL: No numbness or weakness  SKIN: No itching, burning, rashes, or lesions   All other review of systems is negative unless indicated above.

## 2022-08-26 DIAGNOSIS — N18.5 CHRONIC KIDNEY DISEASE, STAGE 5: ICD-10-CM

## 2022-08-26 DIAGNOSIS — E87.5 HYPERKALEMIA: ICD-10-CM

## 2022-08-26 DIAGNOSIS — I16.0 HYPERTENSIVE URGENCY: ICD-10-CM

## 2022-08-26 DIAGNOSIS — E11.9 TYPE 2 DIABETES MELLITUS WITHOUT COMPLICATIONS: ICD-10-CM

## 2022-08-26 DIAGNOSIS — Z00.00 ENCOUNTER FOR GENERAL ADULT MEDICAL EXAMINATION WITHOUT ABNORMAL FINDINGS: ICD-10-CM

## 2022-08-26 DIAGNOSIS — E87.2 ACIDOSIS: ICD-10-CM

## 2022-08-26 DIAGNOSIS — I10 ESSENTIAL (PRIMARY) HYPERTENSION: ICD-10-CM

## 2022-08-26 DIAGNOSIS — N18.9 CHRONIC KIDNEY DISEASE, UNSPECIFIED: ICD-10-CM

## 2022-08-26 LAB
24R-OH-CALCIDIOL SERPL-MCNC: 16.1 NG/ML — LOW (ref 30–80)
A1C WITH ESTIMATED AVERAGE GLUCOSE RESULT: 9.4 % — HIGH (ref 4–5.6)
ALBUMIN SERPL ELPH-MCNC: 3.8 G/DL — SIGNIFICANT CHANGE UP (ref 3.3–5)
ALP SERPL-CCNC: 87 U/L — SIGNIFICANT CHANGE UP (ref 40–120)
ALT FLD-CCNC: 10 U/L — SIGNIFICANT CHANGE UP (ref 4–41)
ANION GAP SERPL CALC-SCNC: 12 MMOL/L — SIGNIFICANT CHANGE UP (ref 7–14)
ANION GAP SERPL CALC-SCNC: 14 MMOL/L — SIGNIFICANT CHANGE UP (ref 7–14)
AST SERPL-CCNC: 16 U/L — SIGNIFICANT CHANGE UP (ref 4–40)
BASOPHILS # BLD AUTO: 0.04 K/UL — SIGNIFICANT CHANGE UP (ref 0–0.2)
BASOPHILS NFR BLD AUTO: 0.5 % — SIGNIFICANT CHANGE UP (ref 0–2)
BILIRUB SERPL-MCNC: 0.4 MG/DL — SIGNIFICANT CHANGE UP (ref 0.2–1.2)
BLD GP AB SCN SERPL QL: NEGATIVE — SIGNIFICANT CHANGE UP
BUN SERPL-MCNC: 54 MG/DL — HIGH (ref 7–23)
CALCIUM SERPL-MCNC: 9.3 MG/DL — SIGNIFICANT CHANGE UP (ref 8.4–10.5)
CHLORIDE SERPL-SCNC: 104 MMOL/L — SIGNIFICANT CHANGE UP (ref 98–107)
CHLORIDE SERPL-SCNC: 107 MMOL/L — SIGNIFICANT CHANGE UP (ref 98–107)
CO2 SERPL-SCNC: 18 MMOL/L — LOW (ref 22–31)
CREAT SERPL-MCNC: 4.99 MG/DL — HIGH (ref 0.5–1.3)
EGFR: 12 ML/MIN/1.73M2 — LOW
EOSINOPHIL # BLD AUTO: 0.35 K/UL — SIGNIFICANT CHANGE UP (ref 0–0.5)
EOSINOPHIL NFR BLD AUTO: 4.5 % — SIGNIFICANT CHANGE UP (ref 0–6)
ESTIMATED AVERAGE GLUCOSE: 223 — SIGNIFICANT CHANGE UP
FERRITIN SERPL-MCNC: 257 NG/ML — SIGNIFICANT CHANGE UP (ref 30–400)
GLUCOSE BLDC GLUCOMTR-MCNC: 133 MG/DL — HIGH (ref 70–99)
GLUCOSE BLDC GLUCOMTR-MCNC: 159 MG/DL — HIGH (ref 70–99)
GLUCOSE BLDC GLUCOMTR-MCNC: 226 MG/DL — HIGH (ref 70–99)
GLUCOSE BLDC GLUCOMTR-MCNC: 279 MG/DL — HIGH (ref 70–99)
GLUCOSE SERPL-MCNC: 147 MG/DL — HIGH (ref 70–99)
HCT VFR BLD CALC: 24.3 % — LOW (ref 39–50)
HCV AB S/CO SERPL IA: 0.11 S/CO — SIGNIFICANT CHANGE UP (ref 0–0.99)
HCV AB SERPL-IMP: SIGNIFICANT CHANGE UP
HGB BLD-MCNC: 7.5 G/DL — LOW (ref 13–17)
IANC: 5.65 K/UL — SIGNIFICANT CHANGE UP (ref 1.8–7.4)
IMM GRANULOCYTES NFR BLD AUTO: 0.3 % — SIGNIFICANT CHANGE UP (ref 0–1.5)
IRON SATN MFR SERPL: 20 % — SIGNIFICANT CHANGE UP (ref 14–50)
IRON SATN MFR SERPL: 41 UG/DL — LOW (ref 45–165)
LYMPHOCYTES # BLD AUTO: 1.14 K/UL — SIGNIFICANT CHANGE UP (ref 1–3.3)
LYMPHOCYTES # BLD AUTO: 14.8 % — SIGNIFICANT CHANGE UP (ref 13–44)
MCHC RBC-ENTMCNC: 19.7 PG — LOW (ref 27–34)
MCHC RBC-ENTMCNC: 30.9 GM/DL — LOW (ref 32–36)
MCV RBC AUTO: 63.8 FL — LOW (ref 80–100)
MONOCYTES # BLD AUTO: 0.51 K/UL — SIGNIFICANT CHANGE UP (ref 0–0.9)
MONOCYTES NFR BLD AUTO: 6.6 % — SIGNIFICANT CHANGE UP (ref 2–14)
NEUTROPHILS # BLD AUTO: 5.65 K/UL — SIGNIFICANT CHANGE UP (ref 1.8–7.4)
NEUTROPHILS NFR BLD AUTO: 73.3 % — SIGNIFICANT CHANGE UP (ref 43–77)
NRBC # BLD: 0 /100 WBCS — SIGNIFICANT CHANGE UP (ref 0–0)
NRBC # FLD: 0 K/UL — SIGNIFICANT CHANGE UP (ref 0–0)
PLATELET # BLD AUTO: 195 K/UL — SIGNIFICANT CHANGE UP (ref 150–400)
POTASSIUM SERPL-MCNC: 4.3 MMOL/L — SIGNIFICANT CHANGE UP (ref 3.5–5.3)
POTASSIUM SERPL-MCNC: 4.8 MMOL/L — SIGNIFICANT CHANGE UP (ref 3.5–5.3)
POTASSIUM SERPL-SCNC: 4.3 MMOL/L — SIGNIFICANT CHANGE UP (ref 3.5–5.3)
POTASSIUM SERPL-SCNC: 4.8 MMOL/L — SIGNIFICANT CHANGE UP (ref 3.5–5.3)
PROT SERPL-MCNC: 7.2 G/DL — SIGNIFICANT CHANGE UP (ref 6–8.3)
PTH-INTACT FLD-MCNC: 86 PG/ML — HIGH (ref 15–65)
RBC # BLD: 3.81 M/UL — LOW (ref 4.2–5.8)
RBC # FLD: 15.8 % — HIGH (ref 10.3–14.5)
RH IG SCN BLD-IMP: POSITIVE — SIGNIFICANT CHANGE UP
SODIUM SERPL-SCNC: 136 MMOL/L — SIGNIFICANT CHANGE UP (ref 135–145)
SODIUM SERPL-SCNC: 137 MMOL/L — SIGNIFICANT CHANGE UP (ref 135–145)
TIBC SERPL-MCNC: 206 UG/DL — LOW (ref 220–430)
TRANSFERRIN SERPL-MCNC: 171 MG/DL — LOW (ref 200–360)
UIBC SERPL-MCNC: 165 UG/DL — SIGNIFICANT CHANGE UP (ref 110–370)
VIT D25+D1,25 OH+D1,25 PNL SERPL-MCNC: 11.9 PG/ML — LOW (ref 19.9–79.3)
WBC # BLD: 7.71 K/UL — SIGNIFICANT CHANGE UP (ref 3.8–10.5)
WBC # FLD AUTO: 7.71 K/UL — SIGNIFICANT CHANGE UP (ref 3.8–10.5)

## 2022-08-26 PROCEDURE — 99222 1ST HOSP IP/OBS MODERATE 55: CPT

## 2022-08-26 PROCEDURE — 99233 SBSQ HOSP IP/OBS HIGH 50: CPT

## 2022-08-26 RX ORDER — DEXTROSE 50 % IN WATER 50 %
15 SYRINGE (ML) INTRAVENOUS ONCE
Refills: 0 | Status: DISCONTINUED | OUTPATIENT
Start: 2022-08-26 | End: 2022-09-02

## 2022-08-26 RX ORDER — INSULIN LISPRO 100/ML
VIAL (ML) SUBCUTANEOUS AT BEDTIME
Refills: 0 | Status: DISCONTINUED | OUTPATIENT
Start: 2022-08-26 | End: 2022-09-02

## 2022-08-26 RX ORDER — DEXTROSE 50 % IN WATER 50 %
12.5 SYRINGE (ML) INTRAVENOUS ONCE
Refills: 0 | Status: DISCONTINUED | OUTPATIENT
Start: 2022-08-26 | End: 2022-09-02

## 2022-08-26 RX ORDER — HYDRALAZINE HCL 50 MG
100 TABLET ORAL THREE TIMES A DAY
Refills: 0 | Status: DISCONTINUED | OUTPATIENT
Start: 2022-08-26 | End: 2022-09-02

## 2022-08-26 RX ORDER — SODIUM CHLORIDE 9 MG/ML
1000 INJECTION, SOLUTION INTRAVENOUS
Refills: 0 | Status: DISCONTINUED | OUTPATIENT
Start: 2022-08-26 | End: 2022-09-02

## 2022-08-26 RX ORDER — HYDRALAZINE HCL 50 MG
100 TABLET ORAL
Refills: 0 | Status: DISCONTINUED | OUTPATIENT
Start: 2022-08-26 | End: 2022-08-26

## 2022-08-26 RX ORDER — INSULIN LISPRO 100/ML
VIAL (ML) SUBCUTANEOUS
Refills: 0 | Status: DISCONTINUED | OUTPATIENT
Start: 2022-08-26 | End: 2022-09-02

## 2022-08-26 RX ORDER — FUROSEMIDE 40 MG
40 TABLET ORAL
Refills: 0 | Status: DISCONTINUED | OUTPATIENT
Start: 2022-08-26 | End: 2022-08-29

## 2022-08-26 RX ORDER — DEXTROSE 50 % IN WATER 50 %
25 SYRINGE (ML) INTRAVENOUS ONCE
Refills: 0 | Status: DISCONTINUED | OUTPATIENT
Start: 2022-08-26 | End: 2022-09-02

## 2022-08-26 RX ORDER — FUROSEMIDE 40 MG
40 TABLET ORAL ONCE
Refills: 0 | Status: COMPLETED | OUTPATIENT
Start: 2022-08-26 | End: 2022-08-26

## 2022-08-26 RX ORDER — HYDRALAZINE HCL 50 MG
0 TABLET ORAL
Qty: 0 | Refills: 3 | DISCHARGE

## 2022-08-26 RX ORDER — AMLODIPINE BESYLATE 2.5 MG/1
10 TABLET ORAL DAILY
Refills: 0 | Status: DISCONTINUED | OUTPATIENT
Start: 2022-08-26 | End: 2022-08-26

## 2022-08-26 RX ORDER — FUROSEMIDE 40 MG
0 TABLET ORAL
Qty: 0 | Refills: 0 | DISCHARGE

## 2022-08-26 RX ORDER — NIFEDIPINE 30 MG
90 TABLET, EXTENDED RELEASE 24 HR ORAL DAILY
Refills: 0 | Status: DISCONTINUED | OUTPATIENT
Start: 2022-08-26 | End: 2022-08-30

## 2022-08-26 RX ORDER — FUROSEMIDE 40 MG
40 TABLET ORAL
Refills: 0 | Status: DISCONTINUED | OUTPATIENT
Start: 2022-08-26 | End: 2022-08-26

## 2022-08-26 RX ORDER — CARVEDILOL PHOSPHATE 80 MG/1
6.25 CAPSULE, EXTENDED RELEASE ORAL EVERY 12 HOURS
Refills: 0 | Status: DISCONTINUED | OUTPATIENT
Start: 2022-08-26 | End: 2022-09-01

## 2022-08-26 RX ORDER — SODIUM BICARBONATE 1 MEQ/ML
650 SYRINGE (ML) INTRAVENOUS THREE TIMES A DAY
Refills: 0 | Status: DISCONTINUED | OUTPATIENT
Start: 2022-08-26 | End: 2022-08-27

## 2022-08-26 RX ORDER — GLUCAGON INJECTION, SOLUTION 0.5 MG/.1ML
1 INJECTION, SOLUTION SUBCUTANEOUS ONCE
Refills: 0 | Status: DISCONTINUED | OUTPATIENT
Start: 2022-08-26 | End: 2022-09-02

## 2022-08-26 RX ADMIN — Medication 650 MILLIGRAM(S): at 14:35

## 2022-08-26 RX ADMIN — AMLODIPINE BESYLATE 10 MILLIGRAM(S): 2.5 TABLET ORAL at 05:54

## 2022-08-26 RX ADMIN — Medication 100 MILLIGRAM(S): at 01:17

## 2022-08-26 RX ADMIN — Medication 90 MILLIGRAM(S): at 14:35

## 2022-08-26 RX ADMIN — Medication 3: at 17:49

## 2022-08-26 RX ADMIN — Medication 650 MILLIGRAM(S): at 22:22

## 2022-08-26 RX ADMIN — Medication 40 MILLIGRAM(S): at 14:35

## 2022-08-26 RX ADMIN — Medication 100 MILLIGRAM(S): at 05:53

## 2022-08-26 RX ADMIN — Medication 1: at 09:27

## 2022-08-26 RX ADMIN — CARVEDILOL PHOSPHATE 6.25 MILLIGRAM(S): 80 CAPSULE, EXTENDED RELEASE ORAL at 17:49

## 2022-08-26 RX ADMIN — Medication 40 MILLIGRAM(S): at 01:18

## 2022-08-26 RX ADMIN — Medication 100 MILLIGRAM(S): at 14:35

## 2022-08-26 RX ADMIN — Medication 2: at 13:22

## 2022-08-26 NOTE — CONSULT NOTE ADULT - PROBLEM SELECTOR RECOMMENDATION 3
sBP 203 down to 180s this admission, up to 265 during recent ED visit. Asymptomatic    Recommendations  ##Pending attending attestation  -Furosemide IVP 40mg BID  -Coreg 6.25 BID  -Switch form amlodipine 10mg to Nifedipine XL 90mg, can titrate moving foward  -C/W Hydralazine 100mg TID  -Hold losartan (unclear if recently taking)

## 2022-08-26 NOTE — CONSULT NOTE ADULT - PROBLEM SELECTOR RECOMMENDATION 4
VBG with pH 7.25  BMP bicarb 19->18->18  pCO2 48  Metabolic and respiratory acidosis    Recommendations  ##Pending attending attestation  -Bicarb 650mg PO

## 2022-08-26 NOTE — CONSULT NOTE ADULT - ASSESSMENT
70 y/o M w/ ESRD requiring AVF/AVG planning.      -Vein mapping ordered  -Please protect right arm  -Please consult IR for possible permacath placement  -AVF planning for next week    D/w Fellow on call on behalf of Dr. Levy Sanchez MD  PGY-2  Vascular Surgery  #[id number] 72 y/o M w/ ESRD requiring AVF/AVG planning.      -Vein mapping ordered  -Please protect left arm  -Please consult IR for possible permacath placement  -AVF planning for next week    D/w Fellow on call on behalf of Dr. Levy Sanchez MD  PGY-2  Vascular Surgery  #[id number]

## 2022-08-26 NOTE — PROGRESS NOTE ADULT - ASSESSMENT
70 y/o M with hx of CKD5 not on dialysis, DM, HTN, currently on renal transplant list, sent by PMD  for peaked T waves on EKG and hypertension, found to be hyperkalemic with mild metabolic acidosis, admitted for management.

## 2022-08-26 NOTE — CONSULT NOTE ADULT - PROBLEM SELECTOR RECOMMENDATION 9
K+ 5.8 on admission, improved to 5.8 following am s/p Calcium gluconate, Insulin+Dex, Lokelma, Lasix  Hx Hyperkalemia (6.0 8/10/22; 6.1 9/9/20)       Recommendations  ##Pending attending attestation  -Lokelma if K>5,5  -Lasix IVP 40mg BID  -Trend BMP  -Trend ECG

## 2022-08-26 NOTE — CONSULT NOTE ADULT - PROBLEM SELECTOR RECOMMENDATION 2
Has been in early stages of planning for dialysis and getting on renal transplant list.  sCr ~3 in February, 4.72 Aug 10th, now ~5  eGFR 12    Recommendations  ##Pending attending attestation  -Dr. Javier to reach out to vascular re: inpatient workup/fistula  -Not requiring immediate HD but may get HD this admission  -Avoid NSAIDs, ACE-Is, ARBs, Nephrotoxins

## 2022-08-26 NOTE — CONSULT NOTE ADULT - SUBJECTIVE AND OBJECTIVE BOX
[facility] DIVISION OF KIDNEY DISEASES AND HYPERTENSION -- [phone]  -- INITIAL CONSULT NOTE  --------------------------------------------------------------------------------  HPI:    70 y/o M with hx of CKD5 with plans for dialysis and renal transplant, DM, HTN, sent by PMD (Dr. Prachi Omer - [phone]) for "abnormal EKG" and elevated blood pressure. PMD notes peaked T-waves and with hx of previous K+ value 6.0 on 8/10, instructed to go to the ER.  Patient denies any symptoms including F/C/N/V, CP, palpitations, SOB, urinary symptoms, HA, dizziness, blurry vision, Abd pain, Pt still makes significant amount of urine. No LE edema, orthopnea. No recent IV contrast, no NSAID use, no decreased PO intake. Unclear losartan use (pt unsure of medications and wife on phone disconnected, per outpt records looks like may have been on it prior to june of this year). Pt follows with Dr. Crowe, planning for HD with appointment in 2nd week of september. Pt also notes feeling cold for weeks.    Of note pt presented to [facility] ED 8/23 with 2x weeks of elevated BPs (sBPs>200) and, per wife, swollen ankle, face and UEs. Found to have /75. Improved to 206/66.  ED course this presentation/admission: K+ 5.8, received calc gluc, insulin+dex, lokelma, furosemide 40mg IVP improved to 4.3 (then 4.8). /74, HR 54-64, satting well on RA.       PAST HISTORY  --------------------------------------------------------------------------------  PAST MEDICAL & SURGICAL HISTORY:  Diabetes      Stage 5 chronic kidney disease not on chronic dialysis      No significant past surgical history        FAMILY HISTORY:  FHx: diabetes mellitus (Father, Aunt)      PAST SOCIAL HISTORY:    ALLERGIES & MEDICATIONS  --------------------------------------------------------------------------------  Allergies    No Known Allergies    Intolerances      Standing Inpatient Medications  amLODIPine   Tablet 10 milliGRAM(s) Oral daily  dextrose 5%. 1000 milliLiter(s) IV Continuous <Continuous>  dextrose 5%. 1000 milliLiter(s) IV Continuous <Continuous>  dextrose 50% Injectable 25 Gram(s) IV Push once  dextrose 50% Injectable 12.5 Gram(s) IV Push once  dextrose 50% Injectable 25 Gram(s) IV Push once  glucagon  Injectable 1 milliGRAM(s) IntraMuscular once  hydrALAZINE 100 milliGRAM(s) Oral three times a day  insulin lispro (ADMELOG) corrective regimen sliding scale   SubCutaneous three times a day before meals  insulin lispro (ADMELOG) corrective regimen sliding scale   SubCutaneous at bedtime    PRN Inpatient Medications  acetaminophen     Tablet .. 650 milliGRAM(s) Oral every 6 hours PRN  dextrose Oral Gel 15 Gram(s) Oral once PRN      REVIEW OF SYSTEMS  --------------------------------------------------------------------------------  Gen: No fevers/chills  Head/Eyes/Ears: Notes edema during recent ED visit. None now. No HA  Respiratory: No dyspnea, cough  CV: No chest pain  GI: No abdominal pain, diarrhea  : No dysuria, hematuria, voiding without issue  MSK: Notes UE edema during recent ED visit. None now.Skin: No rashes      All other systems were reviewed and are negative, except as noted.    VITALS/PHYSICAL EXAM  --------------------------------------------------------------------------------  T(C): 36.7 (08-26-22 @ 10:00), Max: 37 (08-26-22 @ 01:16)  HR: 69 (08-26-22 @ 10:00) (54 - 69)  BP: 185/62 (08-26-22 @ 10:00) (185/62 - 209/74)  RR: 17 (08-26-22 @ 10:00) (13 - 20)  SpO2: 100% (08-26-22 @ 10:00) (99% - 100%)  Wt(kg): --  Height (cm): 172.7 (08-25-22 @ 17:49)      08-26-22 @ 07:01  -  08-26-22 @ 11:42  --------------------------------------------------------  IN: 0 mL / OUT: 280 mL / NET: -280 mL        Physical Exam:  	Gen: NAD  	HEENT: Anicteric, no edema  	Pulm: CTA B/L  	CV: S1S2+, 1/6 systolic murmur appreciated at LLSB  	Abd: Soft, +BS, NTND  	Ext: No LE edema B/L  	Neuro: Awake, alert, speech clear and fluent without facial asymmetry  	Skin: Warm and dry  	Dialysis access: None    LABS/STUDIES  --------------------------------------------------------------------------------              7.5    7.71  >-----------<  195      [08-26-22 @ 06:34]              24.3     136  |  104  |  54  ----------------------------<  147      [08-26-22 @ 06:34]  4.8   |  18  |  4.99        Ca     9.3     [08-26-22 @ 06:34]      Mg     2.30     [08-25-22 @ 23:24]      Phos  4.8     [08-25-22 @ 23:24]    TPro  7.2  /  Alb  3.8  /  TBili  0.4  /  DBili  x   /  AST  16  /  ALT  10  /  AlkPhos  87  [08-26-22 @ 06:34]      Potassium, Serum: 4.8 mmol/L (08.26.22 @ 06:34)       Historical Values  Potassium, Serum: 4.8 mmol/L (08.26.22 @ 06:34)   Potassium, Serum: 4.3 mmol/L (08.25.22 @ 23:24)   Potassium, Serum: 5.8: SPECIMEN NOT HEMOLYZED mmol/ (08.25.22 @ 19:35)   Potassium, Serum: 4.6 mmol/L (05.04.21 @ 19:21)   Potassium, Serum: 4.6 mmol/L (08.26.14 @ 18:56)   Potassium, Serum: 4.7 mmol/L (08.13.13 @ 13:13)     Creatinine Trend:  SCr 4.99 [08-26 @ 06:34]  SCr 4.81 [08-25 @ 23:24]  SCr 5.15 [08-25 @ 19:35]    Urinalysis - [05-04-21 @ 19:21]      Color Yellow / Appearance Clear / SG 1.021 / pH 6.5      Gluc >= 1000 mg/dL / Ketone Negative  / Bili Negative / Urobili <2 mg/dL       Blood Small / Protein >600 mg/dL / Leuk Est Negative / Nitrite Negative      RBC 3 / WBC 3 / Hyaline 8 / Gran  / Sq Epi  / Non Sq Epi 2 / Bacteria Negative    Outpatient and previous labs reviewed:  Worsening Cr over last few years (1.08 in 2013, 1.11 in 2014, 1.5701,62 in 2020, 2.21 in 2021, 3.11 feb 2022, 4.72 8/10/22, 4.81-5.15 this admission)  K+ elevated in past; up to 6.1 in sept 2020  6.0 8/10/22    No renal imaging found in [facility] or [facility]

## 2022-08-26 NOTE — CONSULT NOTE ADULT - ASSESSMENT
72 y/o M with hx of CKD5 with plans for dialysis and renal transplant (still makes urine), DM, HTN, sent by PMD Hyperkalemia (6.0 on 8/10) with ecg changes (peaked T-waves). Pt endorses feeling cold. Notes recent swelling. Otherwise without symptoms Exam benign. Labs significant for severely decreased eGFR, hyperkalemia, metabolic and respiratory acidosis.    ED course this presentation/admission: K+ 5.8, received calc gluc, insulin+dex, lokelma, furosemide 40mg IVP improved to 4.3 (then 4.8). /74, HR 54-64, satting well on RA.     Impression: Hyperkalemia iso Kidney Failure a/w mixed acidosis

## 2022-08-26 NOTE — PATIENT PROFILE ADULT - FALL HARM RISK - UNIVERSAL INTERVENTIONS
Bed in lowest position, wheels locked, appropriate side rails in place/Call bell, personal items and telephone in reach/Instruct patient to call for assistance before getting out of bed or chair/Non-slip footwear when patient is out of bed/[place] to call system/Physically safe environment - no spills, clutter or unnecessary equipment/Purposeful Proactive Rounding/Room/bathroom lighting operational, light cord in reach

## 2022-08-26 NOTE — CONSULT NOTE ADULT - SUBJECTIVE AND OBJECTIVE BOX
HPI: 72 y/o M w/ PMH CKD Stage 5 (on transplant list), HTN, DM o who presents from PCP office for systolic HTN up to 200 and hyperkalemia as well as fluid overload in the face and extremities. Of note the patient has had episodes of hyperkalemia in the past but has never received dialysis and still makes urine. Per the wife the patient has uncontrolled DM and this is the indication for renal failure. The patient endorses he is right handed and does not have a pacemaker. Denies history of surgery on arm, vasculitis, thrombosis of arm, infection, trauma, etc.    Vascular Surgery is consulted i/s/o definitive HD access as the patient has emergent indications.     PMH: As above    PSH: None    Allergies: NKDA    Medications: Losartan, Hydralazine, Furosemide, Amlodipine, Statin    ROS: Lives with wife. Listed for transplant     Physical exam:    Vitals:    /68 HR 62 SpO2 100 T 36.9 RR 17    General- NAD. Non toxic appearing and pleasant man  Extremities: Strong radial artery pulse bilaterally. Strong brachial artery pulse bilaterally. No evidence of surgical scars. Superficial veins not identified. No ischemia of digits noted. Mega test negative.  Lungs- Comfortable on room air          HPI: 72 y/o M w/ PMH CKD Stage 5 (on transplant list), HTN, DM o who presents from PCP office for systolic HTN up to 200 and hyperkalemia as well as fluid overload in the face and extremities. Of note the patient has had episodes of hyperkalemia in the past but has never received dialysis and still makes urine. Per the wife the patient has uncontrolled DM and this is the indication for renal failure. The patient endorses he is right handed and does not have a pacemaker. Denies history of surgery on arm, vasculitis, thrombosis of arm, infection, trauma, etc.    In the ED received calcium gluconate for abnormal EKG. Also receiving blood pressure medications.     Vascular Surgery is consulted i/s/o definitive HD access as the patient has emergent indications.     PMH: As above    PSH: None    Allergies: NKDA    Medications: Losartan, Hydralazine, Furosemide, Amlodipine, Statin    ROS: Lives with wife. Listed for transplant     Physical exam:    Vitals:    /68 HR 62 SpO2 100 T 36.9 RR 17    General- NAD. Non toxic appearing and pleasant man  Extremities: Strong radial artery pulse bilaterally. Strong brachial artery pulse bilaterally. No evidence of surgical scars. Superficial veins not identified. No ischemia of digits noted. Mega test negative.  Lungs- Comfortable on room air

## 2022-08-27 DIAGNOSIS — E87.2 ACIDOSIS: ICD-10-CM

## 2022-08-27 LAB
ANION GAP SERPL CALC-SCNC: 15 MMOL/L — HIGH (ref 7–14)
BLD GP AB SCN SERPL QL: NEGATIVE — SIGNIFICANT CHANGE UP
BUN SERPL-MCNC: 58 MG/DL — HIGH (ref 7–23)
CALCIUM SERPL-MCNC: 8.6 MG/DL — SIGNIFICANT CHANGE UP (ref 8.4–10.5)
CHLORIDE SERPL-SCNC: 106 MMOL/L — SIGNIFICANT CHANGE UP (ref 98–107)
CO2 SERPL-SCNC: 17 MMOL/L — LOW (ref 22–31)
CREAT SERPL-MCNC: 5.86 MG/DL — HIGH (ref 0.5–1.3)
EGFR: 10 ML/MIN/1.73M2 — LOW
GLUCOSE BLDC GLUCOMTR-MCNC: 120 MG/DL — HIGH (ref 70–99)
GLUCOSE BLDC GLUCOMTR-MCNC: 186 MG/DL — HIGH (ref 70–99)
GLUCOSE BLDC GLUCOMTR-MCNC: 199 MG/DL — HIGH (ref 70–99)
GLUCOSE BLDC GLUCOMTR-MCNC: 264 MG/DL — HIGH (ref 70–99)
GLUCOSE SERPL-MCNC: 180 MG/DL — HIGH (ref 70–99)
HCT VFR BLD CALC: 22.7 % — LOW (ref 39–50)
HGB BLD-MCNC: 6.9 G/DL — CRITICAL LOW (ref 13–17)
MAGNESIUM SERPL-MCNC: 2.2 MG/DL — SIGNIFICANT CHANGE UP (ref 1.6–2.6)
MCHC RBC-ENTMCNC: 19.8 PG — LOW (ref 27–34)
MCHC RBC-ENTMCNC: 30.4 GM/DL — LOW (ref 32–36)
MCV RBC AUTO: 65 FL — LOW (ref 80–100)
NRBC # BLD: 0 /100 WBCS — SIGNIFICANT CHANGE UP (ref 0–0)
NRBC # FLD: 0 K/UL — SIGNIFICANT CHANGE UP (ref 0–0)
PHOSPHATE SERPL-MCNC: 5.6 MG/DL — HIGH (ref 2.5–4.5)
PLATELET # BLD AUTO: 200 K/UL — SIGNIFICANT CHANGE UP (ref 150–400)
POTASSIUM SERPL-MCNC: 5 MMOL/L — SIGNIFICANT CHANGE UP (ref 3.5–5.3)
POTASSIUM SERPL-SCNC: 5 MMOL/L — SIGNIFICANT CHANGE UP (ref 3.5–5.3)
RBC # BLD: 3.49 M/UL — LOW (ref 4.2–5.8)
RBC # FLD: 15.4 % — HIGH (ref 10.3–14.5)
RH IG SCN BLD-IMP: POSITIVE — SIGNIFICANT CHANGE UP
SODIUM SERPL-SCNC: 138 MMOL/L — SIGNIFICANT CHANGE UP (ref 135–145)
WBC # BLD: 6.88 K/UL — SIGNIFICANT CHANGE UP (ref 3.8–10.5)
WBC # FLD AUTO: 6.88 K/UL — SIGNIFICANT CHANGE UP (ref 3.8–10.5)

## 2022-08-27 PROCEDURE — 93970 EXTREMITY STUDY: CPT | Mod: 26

## 2022-08-27 PROCEDURE — 99233 SBSQ HOSP IP/OBS HIGH 50: CPT

## 2022-08-27 PROCEDURE — 99232 SBSQ HOSP IP/OBS MODERATE 35: CPT | Mod: GC

## 2022-08-27 PROCEDURE — 83020 HEMOGLOBIN ELECTROPHORESIS: CPT | Mod: 26

## 2022-08-27 RX ORDER — SODIUM BICARBONATE 1 MEQ/ML
1300 SYRINGE (ML) INTRAVENOUS
Refills: 0 | Status: DISCONTINUED | OUTPATIENT
Start: 2022-08-27 | End: 2022-09-02

## 2022-08-27 RX ADMIN — Medication 90 MILLIGRAM(S): at 05:46

## 2022-08-27 RX ADMIN — Medication 1300 MILLIGRAM(S): at 17:27

## 2022-08-27 RX ADMIN — Medication 3: at 13:28

## 2022-08-27 RX ADMIN — Medication 1: at 08:54

## 2022-08-27 RX ADMIN — Medication 650 MILLIGRAM(S): at 05:46

## 2022-08-27 RX ADMIN — Medication 40 MILLIGRAM(S): at 05:47

## 2022-08-27 RX ADMIN — CARVEDILOL PHOSPHATE 6.25 MILLIGRAM(S): 80 CAPSULE, EXTENDED RELEASE ORAL at 05:47

## 2022-08-27 RX ADMIN — Medication 40 MILLIGRAM(S): at 13:25

## 2022-08-27 NOTE — PROVIDER CONTACT NOTE (OTHER) - RECOMMENDATIONS
RITA Gordon notified. will continue to monitor BP.
RITA Gordon notified. will continue to monitor BP

## 2022-08-28 LAB
ANION GAP SERPL CALC-SCNC: 15 MMOL/L — HIGH (ref 7–14)
ANION GAP SERPL CALC-SCNC: 15 MMOL/L — HIGH (ref 7–14)
BUN SERPL-MCNC: 66 MG/DL — HIGH (ref 7–23)
BUN SERPL-MCNC: 73 MG/DL — HIGH (ref 7–23)
CALCIUM SERPL-MCNC: 8.2 MG/DL — LOW (ref 8.4–10.5)
CALCIUM SERPL-MCNC: 8.5 MG/DL — SIGNIFICANT CHANGE UP (ref 8.4–10.5)
CHLORIDE SERPL-SCNC: 101 MMOL/L — SIGNIFICANT CHANGE UP (ref 98–107)
CHLORIDE SERPL-SCNC: 104 MMOL/L — SIGNIFICANT CHANGE UP (ref 98–107)
CO2 SERPL-SCNC: 19 MMOL/L — LOW (ref 22–31)
CO2 SERPL-SCNC: 20 MMOL/L — LOW (ref 22–31)
CREAT SERPL-MCNC: 5.8 MG/DL — HIGH (ref 0.5–1.3)
CREAT SERPL-MCNC: 5.95 MG/DL — HIGH (ref 0.5–1.3)
EGFR: 10 ML/MIN/1.73M2 — LOW
EGFR: 10 ML/MIN/1.73M2 — LOW
GLUCOSE BLDC GLUCOMTR-MCNC: 165 MG/DL — HIGH (ref 70–99)
GLUCOSE BLDC GLUCOMTR-MCNC: 189 MG/DL — HIGH (ref 70–99)
GLUCOSE BLDC GLUCOMTR-MCNC: 211 MG/DL — HIGH (ref 70–99)
GLUCOSE BLDC GLUCOMTR-MCNC: 216 MG/DL — HIGH (ref 70–99)
GLUCOSE SERPL-MCNC: 146 MG/DL — HIGH (ref 70–99)
GLUCOSE SERPL-MCNC: 148 MG/DL — HIGH (ref 70–99)
HCT VFR BLD CALC: 21.4 % — LOW (ref 39–50)
HCT VFR BLD CALC: 27 % — LOW (ref 39–50)
HGB BLD-MCNC: 6.8 G/DL — CRITICAL LOW (ref 13–17)
HGB BLD-MCNC: 8.6 G/DL — LOW (ref 13–17)
MAGNESIUM SERPL-MCNC: 2.2 MG/DL — SIGNIFICANT CHANGE UP (ref 1.6–2.6)
MAGNESIUM SERPL-MCNC: 2.3 MG/DL — SIGNIFICANT CHANGE UP (ref 1.6–2.6)
MCHC RBC-ENTMCNC: 20.2 PG — LOW (ref 27–34)
MCHC RBC-ENTMCNC: 21.1 PG — LOW (ref 27–34)
MCHC RBC-ENTMCNC: 31.8 GM/DL — LOW (ref 32–36)
MCHC RBC-ENTMCNC: 31.9 GM/DL — LOW (ref 32–36)
MCV RBC AUTO: 63.7 FL — LOW (ref 80–100)
MCV RBC AUTO: 66.3 FL — LOW (ref 80–100)
NRBC # BLD: 0 /100 WBCS — SIGNIFICANT CHANGE UP (ref 0–0)
NRBC # BLD: 0 /100 WBCS — SIGNIFICANT CHANGE UP (ref 0–0)
NRBC # FLD: 0 K/UL — SIGNIFICANT CHANGE UP (ref 0–0)
NRBC # FLD: 0 K/UL — SIGNIFICANT CHANGE UP (ref 0–0)
PHOSPHATE SERPL-MCNC: 6.4 MG/DL — HIGH (ref 2.5–4.5)
PHOSPHATE SERPL-MCNC: 7.1 MG/DL — HIGH (ref 2.5–4.5)
PLATELET # BLD AUTO: 197 K/UL — SIGNIFICANT CHANGE UP (ref 150–400)
PLATELET # BLD AUTO: 206 K/UL — SIGNIFICANT CHANGE UP (ref 150–400)
POTASSIUM SERPL-MCNC: 4.5 MMOL/L — SIGNIFICANT CHANGE UP (ref 3.5–5.3)
POTASSIUM SERPL-MCNC: 6.1 MMOL/L — HIGH (ref 3.5–5.3)
POTASSIUM SERPL-SCNC: 4.5 MMOL/L — SIGNIFICANT CHANGE UP (ref 3.5–5.3)
POTASSIUM SERPL-SCNC: 6.1 MMOL/L — HIGH (ref 3.5–5.3)
RBC # BLD: 3.36 M/UL — LOW (ref 4.2–5.8)
RBC # BLD: 4.07 M/UL — LOW (ref 4.2–5.8)
RBC # FLD: 15.1 % — HIGH (ref 10.3–14.5)
RBC # FLD: 16.8 % — HIGH (ref 10.3–14.5)
SODIUM SERPL-SCNC: 136 MMOL/L — SIGNIFICANT CHANGE UP (ref 135–145)
SODIUM SERPL-SCNC: 138 MMOL/L — SIGNIFICANT CHANGE UP (ref 135–145)
WBC # BLD: 5.4 K/UL — SIGNIFICANT CHANGE UP (ref 3.8–10.5)
WBC # BLD: 6.34 K/UL — SIGNIFICANT CHANGE UP (ref 3.8–10.5)
WBC # FLD AUTO: 5.4 K/UL — SIGNIFICANT CHANGE UP (ref 3.8–10.5)
WBC # FLD AUTO: 6.34 K/UL — SIGNIFICANT CHANGE UP (ref 3.8–10.5)

## 2022-08-28 PROCEDURE — 99233 SBSQ HOSP IP/OBS HIGH 50: CPT

## 2022-08-28 RX ADMIN — Medication 100 MILLIGRAM(S): at 14:30

## 2022-08-28 RX ADMIN — CARVEDILOL PHOSPHATE 6.25 MILLIGRAM(S): 80 CAPSULE, EXTENDED RELEASE ORAL at 05:53

## 2022-08-28 RX ADMIN — CARVEDILOL PHOSPHATE 6.25 MILLIGRAM(S): 80 CAPSULE, EXTENDED RELEASE ORAL at 18:07

## 2022-08-28 RX ADMIN — Medication 1300 MILLIGRAM(S): at 05:52

## 2022-08-28 RX ADMIN — Medication 1300 MILLIGRAM(S): at 18:07

## 2022-08-28 RX ADMIN — Medication 90 MILLIGRAM(S): at 05:52

## 2022-08-28 RX ADMIN — Medication 2: at 09:28

## 2022-08-28 RX ADMIN — Medication 40 MILLIGRAM(S): at 14:30

## 2022-08-28 RX ADMIN — Medication 100 MILLIGRAM(S): at 21:32

## 2022-08-28 RX ADMIN — Medication 2: at 13:07

## 2022-08-28 RX ADMIN — Medication 40 MILLIGRAM(S): at 05:52

## 2022-08-28 RX ADMIN — Medication 1: at 18:08

## 2022-08-28 NOTE — PROVIDER CONTACT NOTE (CRITICAL VALUE NOTIFICATION) - ACTION/TREATMENT ORDERED:
RITA Brito aware. No new orders at this time. Safety maintained.
ACP Anika aware. No new orders at this time. Safety maintained.

## 2022-08-28 NOTE — PROVIDER CONTACT NOTE (CRITICAL VALUE NOTIFICATION) - ASSESSMENT
Pt is A&Ox4. No signs/symptoms or complaints of distress noted.
patient is A&Ox4. No signs/symptoms or complaints of distress noted.

## 2022-08-29 ENCOUNTER — APPOINTMENT (OUTPATIENT)
Dept: CT IMAGING | Facility: IMAGING CENTER | Age: 72
End: 2022-08-29

## 2022-08-29 ENCOUNTER — APPOINTMENT (OUTPATIENT)
Dept: CARDIOLOGY | Facility: CLINIC | Age: 72
End: 2022-08-29

## 2022-08-29 ENCOUNTER — APPOINTMENT (OUTPATIENT)
Dept: RADIOLOGY | Facility: IMAGING CENTER | Age: 72
End: 2022-08-29

## 2022-08-29 DIAGNOSIS — E83.39 OTHER DISORDERS OF PHOSPHORUS METABOLISM: ICD-10-CM

## 2022-08-29 LAB
ANION GAP SERPL CALC-SCNC: 16 MMOL/L — HIGH (ref 7–14)
BUN SERPL-MCNC: 77 MG/DL — HIGH (ref 7–23)
CALCIUM SERPL-MCNC: 8.2 MG/DL — LOW (ref 8.4–10.5)
CHLORIDE SERPL-SCNC: 101 MMOL/L — SIGNIFICANT CHANGE UP (ref 98–107)
CO2 SERPL-SCNC: 19 MMOL/L — LOW (ref 22–31)
CREAT SERPL-MCNC: 6.12 MG/DL — HIGH (ref 0.5–1.3)
EGFR: 9 ML/MIN/1.73M2 — LOW
GLUCOSE BLDC GLUCOMTR-MCNC: 132 MG/DL — HIGH (ref 70–99)
GLUCOSE BLDC GLUCOMTR-MCNC: 151 MG/DL — HIGH (ref 70–99)
GLUCOSE BLDC GLUCOMTR-MCNC: 174 MG/DL — HIGH (ref 70–99)
GLUCOSE BLDC GLUCOMTR-MCNC: 282 MG/DL — HIGH (ref 70–99)
GLUCOSE SERPL-MCNC: 143 MG/DL — HIGH (ref 70–99)
HCT VFR BLD CALC: 27.2 % — LOW (ref 39–50)
HGB BLD-MCNC: 8.6 G/DL — LOW (ref 13–17)
MAGNESIUM SERPL-MCNC: 2.2 MG/DL — SIGNIFICANT CHANGE UP (ref 1.6–2.6)
MCHC RBC-ENTMCNC: 20.8 PG — LOW (ref 27–34)
MCHC RBC-ENTMCNC: 31.6 GM/DL — LOW (ref 32–36)
MCV RBC AUTO: 65.9 FL — LOW (ref 80–100)
NRBC # BLD: 0 /100 WBCS — SIGNIFICANT CHANGE UP (ref 0–0)
NRBC # FLD: 0 K/UL — SIGNIFICANT CHANGE UP (ref 0–0)
PHOSPHATE SERPL-MCNC: 6.3 MG/DL — HIGH (ref 2.5–4.5)
PLATELET # BLD AUTO: 200 K/UL — SIGNIFICANT CHANGE UP (ref 150–400)
POTASSIUM SERPL-MCNC: 4.3 MMOL/L — SIGNIFICANT CHANGE UP (ref 3.5–5.3)
POTASSIUM SERPL-SCNC: 4.3 MMOL/L — SIGNIFICANT CHANGE UP (ref 3.5–5.3)
RBC # BLD: 4.13 M/UL — LOW (ref 4.2–5.8)
RBC # FLD: 16 % — HIGH (ref 10.3–14.5)
SODIUM SERPL-SCNC: 136 MMOL/L — SIGNIFICANT CHANGE UP (ref 135–145)
WBC # BLD: 6.25 K/UL — SIGNIFICANT CHANGE UP (ref 3.8–10.5)
WBC # FLD AUTO: 6.25 K/UL — SIGNIFICANT CHANGE UP (ref 3.8–10.5)

## 2022-08-29 PROCEDURE — 99232 SBSQ HOSP IP/OBS MODERATE 35: CPT

## 2022-08-29 PROCEDURE — 99233 SBSQ HOSP IP/OBS HIGH 50: CPT | Mod: GC

## 2022-08-29 RX ORDER — FUROSEMIDE 40 MG
40 TABLET ORAL DAILY
Refills: 0 | Status: DISCONTINUED | OUTPATIENT
Start: 2022-08-30 | End: 2022-09-01

## 2022-08-29 RX ORDER — CALCIUM ACETATE 667 MG
1334 TABLET ORAL
Refills: 0 | Status: DISCONTINUED | OUTPATIENT
Start: 2022-08-29 | End: 2022-09-02

## 2022-08-29 RX ORDER — ERYTHROPOIETIN 10000 [IU]/ML
2000 INJECTION, SOLUTION INTRAVENOUS; SUBCUTANEOUS
Refills: 0 | Status: DISCONTINUED | OUTPATIENT
Start: 2022-08-29 | End: 2022-09-02

## 2022-08-29 RX ORDER — SODIUM ZIRCONIUM CYCLOSILICATE 10 G/10G
10 POWDER, FOR SUSPENSION ORAL ONCE
Refills: 0 | Status: COMPLETED | OUTPATIENT
Start: 2022-08-29 | End: 2022-08-29

## 2022-08-29 RX ORDER — IRON SUCROSE 20 MG/ML
200 INJECTION, SOLUTION INTRAVENOUS EVERY 24 HOURS
Refills: 0 | Status: DISCONTINUED | OUTPATIENT
Start: 2022-08-29 | End: 2022-09-02

## 2022-08-29 RX ADMIN — Medication 40 MILLIGRAM(S): at 05:45

## 2022-08-29 RX ADMIN — Medication 100 MILLIGRAM(S): at 21:56

## 2022-08-29 RX ADMIN — Medication 1: at 08:51

## 2022-08-29 RX ADMIN — Medication 1300 MILLIGRAM(S): at 05:45

## 2022-08-29 RX ADMIN — Medication 3: at 12:47

## 2022-08-29 RX ADMIN — SODIUM ZIRCONIUM CYCLOSILICATE 10 GRAM(S): 10 POWDER, FOR SUSPENSION ORAL at 02:15

## 2022-08-29 RX ADMIN — Medication 1334 MILLIGRAM(S): at 18:02

## 2022-08-29 RX ADMIN — CARVEDILOL PHOSPHATE 6.25 MILLIGRAM(S): 80 CAPSULE, EXTENDED RELEASE ORAL at 18:00

## 2022-08-29 RX ADMIN — Medication 100 MILLIGRAM(S): at 05:46

## 2022-08-29 RX ADMIN — Medication 90 MILLIGRAM(S): at 05:45

## 2022-08-29 RX ADMIN — IRON SUCROSE 110 MILLIGRAM(S): 20 INJECTION, SOLUTION INTRAVENOUS at 18:50

## 2022-08-29 RX ADMIN — Medication 1300 MILLIGRAM(S): at 18:00

## 2022-08-29 RX ADMIN — Medication 100 MILLIGRAM(S): at 16:13

## 2022-08-29 RX ADMIN — CARVEDILOL PHOSPHATE 6.25 MILLIGRAM(S): 80 CAPSULE, EXTENDED RELEASE ORAL at 05:45

## 2022-08-29 NOTE — PROGRESS NOTE ADULT - ASSESSMENT
70 y/o M with hx of CKD5 not on dialysis, DM, HTN, currently on renal transplant list, sent by PMD for peaked T waves on EKG and hypertension, found to be hyperkalemic with mild metabolic acidosis, admitted for management. Pending AVF placement for eventual HD.

## 2022-08-30 DIAGNOSIS — Z01.818 ENCOUNTER FOR OTHER PREPROCEDURAL EXAMINATION: ICD-10-CM

## 2022-08-30 LAB
ANION GAP SERPL CALC-SCNC: 15 MMOL/L — HIGH (ref 7–14)
BUN SERPL-MCNC: 84 MG/DL — HIGH (ref 7–23)
CALCIUM SERPL-MCNC: 8.6 MG/DL — SIGNIFICANT CHANGE UP (ref 8.4–10.5)
CHLORIDE SERPL-SCNC: 100 MMOL/L — SIGNIFICANT CHANGE UP (ref 98–107)
CO2 SERPL-SCNC: 22 MMOL/L — SIGNIFICANT CHANGE UP (ref 22–31)
CREAT SERPL-MCNC: 6.3 MG/DL — HIGH (ref 0.5–1.3)
EGFR: 9 ML/MIN/1.73M2 — LOW
GLUCOSE BLDC GLUCOMTR-MCNC: 144 MG/DL — HIGH (ref 70–99)
GLUCOSE BLDC GLUCOMTR-MCNC: 158 MG/DL — HIGH (ref 70–99)
GLUCOSE BLDC GLUCOMTR-MCNC: 232 MG/DL — HIGH (ref 70–99)
GLUCOSE BLDC GLUCOMTR-MCNC: 244 MG/DL — HIGH (ref 70–99)
GLUCOSE SERPL-MCNC: 157 MG/DL — HIGH (ref 70–99)
HCT VFR BLD CALC: 28.7 % — LOW (ref 39–50)
HEMOGLOBIN INTERPRETATION: SIGNIFICANT CHANGE UP
HGB A MFR BLD: 95.7 % — SIGNIFICANT CHANGE UP (ref 95–97.6)
HGB A2 MFR BLD: 3.6 % — HIGH (ref 2.4–3.5)
HGB BLD-MCNC: 9.2 G/DL — LOW (ref 13–17)
HGB F MFR BLD: <1 % — SIGNIFICANT CHANGE UP (ref 0–1.5)
MAGNESIUM SERPL-MCNC: 2.3 MG/DL — SIGNIFICANT CHANGE UP (ref 1.6–2.6)
MCHC RBC-ENTMCNC: 20.9 PG — LOW (ref 27–34)
MCHC RBC-ENTMCNC: 32.1 GM/DL — SIGNIFICANT CHANGE UP (ref 32–36)
MCV RBC AUTO: 65.2 FL — LOW (ref 80–100)
NRBC # BLD: 0 /100 WBCS — SIGNIFICANT CHANGE UP (ref 0–0)
NRBC # FLD: 0 K/UL — SIGNIFICANT CHANGE UP (ref 0–0)
PHOSPHATE SERPL-MCNC: 6.3 MG/DL — HIGH (ref 2.5–4.5)
PLATELET # BLD AUTO: 202 K/UL — SIGNIFICANT CHANGE UP (ref 150–400)
POTASSIUM SERPL-MCNC: 4.2 MMOL/L — SIGNIFICANT CHANGE UP (ref 3.5–5.3)
POTASSIUM SERPL-SCNC: 4.2 MMOL/L — SIGNIFICANT CHANGE UP (ref 3.5–5.3)
RBC # BLD: 4.4 M/UL — SIGNIFICANT CHANGE UP (ref 4.2–5.8)
RBC # FLD: 16.4 % — HIGH (ref 10.3–14.5)
SODIUM SERPL-SCNC: 137 MMOL/L — SIGNIFICANT CHANGE UP (ref 135–145)
WBC # BLD: 5.58 K/UL — SIGNIFICANT CHANGE UP (ref 3.8–10.5)
WBC # FLD AUTO: 5.58 K/UL — SIGNIFICANT CHANGE UP (ref 3.8–10.5)

## 2022-08-30 PROCEDURE — 99233 SBSQ HOSP IP/OBS HIGH 50: CPT | Mod: GC

## 2022-08-30 PROCEDURE — 93010 ELECTROCARDIOGRAM REPORT: CPT

## 2022-08-30 PROCEDURE — 99232 SBSQ HOSP IP/OBS MODERATE 35: CPT

## 2022-08-30 RX ORDER — NIFEDIPINE 30 MG
120 TABLET, EXTENDED RELEASE 24 HR ORAL DAILY
Refills: 0 | Status: DISCONTINUED | OUTPATIENT
Start: 2022-08-31 | End: 2022-09-02

## 2022-08-30 RX ORDER — NIFEDIPINE 30 MG
30 TABLET, EXTENDED RELEASE 24 HR ORAL ONCE
Refills: 0 | Status: COMPLETED | OUTPATIENT
Start: 2022-08-30 | End: 2022-08-30

## 2022-08-30 RX ADMIN — Medication 1334 MILLIGRAM(S): at 12:58

## 2022-08-30 RX ADMIN — Medication 30 MILLIGRAM(S): at 15:40

## 2022-08-30 RX ADMIN — Medication 100 MILLIGRAM(S): at 12:58

## 2022-08-30 RX ADMIN — Medication 1334 MILLIGRAM(S): at 08:49

## 2022-08-30 RX ADMIN — Medication 40 MILLIGRAM(S): at 05:00

## 2022-08-30 RX ADMIN — Medication 2: at 12:57

## 2022-08-30 RX ADMIN — Medication 1300 MILLIGRAM(S): at 18:21

## 2022-08-30 RX ADMIN — Medication 100 MILLIGRAM(S): at 22:14

## 2022-08-30 RX ADMIN — CARVEDILOL PHOSPHATE 6.25 MILLIGRAM(S): 80 CAPSULE, EXTENDED RELEASE ORAL at 18:22

## 2022-08-30 RX ADMIN — Medication 1334 MILLIGRAM(S): at 18:21

## 2022-08-30 RX ADMIN — CARVEDILOL PHOSPHATE 6.25 MILLIGRAM(S): 80 CAPSULE, EXTENDED RELEASE ORAL at 05:00

## 2022-08-30 RX ADMIN — Medication 100 MILLIGRAM(S): at 05:00

## 2022-08-30 RX ADMIN — Medication 90 MILLIGRAM(S): at 05:00

## 2022-08-30 RX ADMIN — ERYTHROPOIETIN 2000 UNIT(S): 10000 INJECTION, SOLUTION INTRAVENOUS; SUBCUTANEOUS at 12:57

## 2022-08-30 RX ADMIN — Medication 1: at 08:48

## 2022-08-30 RX ADMIN — Medication 1300 MILLIGRAM(S): at 05:00

## 2022-08-30 RX ADMIN — IRON SUCROSE 110 MILLIGRAM(S): 20 INJECTION, SOLUTION INTRAVENOUS at 18:21

## 2022-08-30 NOTE — DIETITIAN INITIAL EVALUATION ADULT - PERTINENT LABORATORY DATA
08-30    137  |  100  |  84<H>  ----------------------------<  157<H>  4.2   |  22  |  6.30<H>    Ca    8.6      30 Aug 2022 08:06  Phos  6.3     08-30  Mg     2.30     08-30    POCT Blood Glucose.: 232 mg/dL (08-30-22 @ 12:33)  A1C with Estimated Average Glucose Result: 9.4 % (08-26-22 @ 06:34)

## 2022-08-30 NOTE — DIETITIAN INITIAL EVALUATION ADULT - PERTINENT MEDS FT
MEDICATIONS  (STANDING):  calcium acetate 1334 milliGRAM(s) Oral three times a day with meals  carvedilol 6.25 milliGRAM(s) Oral every 12 hours  dextrose 5%. 1000 milliLiter(s) (100 mL/Hr) IV Continuous <Continuous>  dextrose 5%. 1000 milliLiter(s) (50 mL/Hr) IV Continuous <Continuous>  dextrose 50% Injectable 25 Gram(s) IV Push once  dextrose 50% Injectable 12.5 Gram(s) IV Push once  dextrose 50% Injectable 25 Gram(s) IV Push once  epoetin reilly-epbx (RETACRIT) Injectable 2000 Unit(s) IV Push every 7 days  furosemide   Injectable 40 milliGRAM(s) IV Push daily  glucagon  Injectable 1 milliGRAM(s) IntraMuscular once  hydrALAZINE 100 milliGRAM(s) Oral three times a day  insulin lispro (ADMELOG) corrective regimen sliding scale   SubCutaneous three times a day before meals  insulin lispro (ADMELOG) corrective regimen sliding scale   SubCutaneous at bedtime  iron sucrose IVPB 200 milliGRAM(s) IV Intermittent every 24 hours  NIFEdipine XL 30 milliGRAM(s) Oral once  sodium bicarbonate 1300 milliGRAM(s) Oral two times a day    MEDICATIONS  (PRN):  acetaminophen     Tablet .. 650 milliGRAM(s) Oral every 6 hours PRN Temp greater or equal to 38C (100.4F), Mild Pain (1 - 3)  dextrose Oral Gel 15 Gram(s) Oral once PRN Blood Glucose LESS THAN 70 milliGRAM(s)/deciliter

## 2022-08-30 NOTE — DIETITIAN INITIAL EVALUATION ADULT - ORAL INTAKE PTA/DIET HISTORY
Pt lives with his wife, who usually helps with food preparation. Denies having difficulty with food procurement. Patient reports decline PO intake in the past 6 mos due to dislike the food his wife prepared for him. Pt states he had intentional weight lost of 20lb for the past 2 years. Pt reports follow low sodium and low sugar diet at home. Pt consumes salads most of the time. Likely patient is not meeting his protein calorie needs.

## 2022-08-30 NOTE — CONSULT NOTE ADULT - ATTENDING COMMENTS
Pre op for AVF.  No active cardiac conditions.   No objection to echo, but no other cardiac testing is required prior to procedure.
new onset ESRD now requiring HD  pt. needs HD access  will plan for AVF creation on this admission once medically optimized  protect L arm   vein mapping
71 year old male with hx uncontrolled HTN and DM recently established care with DR. Leon. CKD 5, acidosis, Hyperkalemia   issues are hyperkalemia recurrent with high BP   suggest   -add Coreg 6.25mg BID  -change Amlodipine to Nifedipine 90mg and titrate per BP  -start Lasix 40mg IV BID   -C/W Hydralazine 100mg TID  -Hold losartan (unclear if recently taking)  -Lokelma 10gm if K >5.5   -I called Dr. Lockett for fistula placement while here   -start Po bicarbonate as above   -will make a decision regarding needs for HD inpatient by early next week depending on how he does ( BP and K)  Further detailed plan of management and recommendation as outlined above by the renal resident.

## 2022-08-30 NOTE — CONSULT NOTE ADULT - ASSESSMENT
Mr. Art is a 71yoM presenting to the hospital for hypertensive urgency 2/2 worsening CKD 5.     #Pre-op  Cardiovascular Risk Stratification - RCRI =  (2).  1. History of ischemic heart disease: None  2. History of congestive heart failure: None  3. History of cerebrovascular disease (stroke or transient ischemic attack): None  4. History of diabetes requiring preoperative insulin use: 1  5. Chronic kidney disease (creatinine > 2 mg/dL): 1  6. Undergoing suprainguinal vascular, intraperitoneal, or intrathoracic surgery: 0   0 predictors = 0.4%, 1 predictor = 0.9%, 2 predictors = 6.6%, =3 predictors = >11%    - Overall this patient is as 6.6% risk (for cardiac death, nonfatal myocardial infarction, and nonfatal cardiac arrest perioperatively for this low risk procedure).    Mr. Cornelius is overall moderate risk for a low risk procedure.   It is reasonable to obtain a TTE prior to procedure.   Please repeat ECG now that hyperkalemia is resolved.   There is no additional cardiac testing that needs to be completed at this time.       #HTN  In the setting of worsening renal function. Improved from admission. Still above goal.   -Nephrology currently following and titrating antihypertensives. Will defer.

## 2022-08-30 NOTE — PROGRESS NOTE ADULT - ASSESSMENT
70 y/o M w/ ESRD requiring AVF/AVG planning. Vein mapping Reviewed.    Plan:  - plan for RUE AVF on Thu 9/1  - please continue to protect RUE (no IVs, BP, blood draws, etc)  - please document medical and cardiac clearance for the procedure  - please assess if pt will need dialysis on Wed 8/31 prior to procedure   - will preop and consent tomorrow   - will follow     Vascular Surgery  [id number]

## 2022-08-30 NOTE — DIETITIAN INITIAL EVALUATION ADULT - NS FNS WEIGHT CHANGE REASON
Patient reported UBW 180lbs 2 years ago, most current weight 158.9lbs. Patient follows low sodium and sugar diet at home./intentional

## 2022-08-30 NOTE — CONSULT NOTE ADULT - SUBJECTIVE AND OBJECTIVE BOX
Patient seen and evaluated at bedside    Reason for consult: pre-op eval    Update: Patient examined at bedside no concerns or complaints. No cardiac disease in past. Patient walks at least 4 blocks daily at home for exercise without any limitations. Denies any recent CP/SOB symptoms.       HPI:  72 y/o M with hx of CKD5 not on dialysis, DM, HTN, currently on renal transplant list, sent by PMD (Dr. Prachi Omer - [phone]) for "abnormal EKG" and elevated blood pressure.  During today's follow up visit with PMD, patient had peaked T-waves and with hx of previous K+ value 6.0 on 8/10, instructed to go to the ER.  Patient denies any symptoms including F/C/N/V, CP, palpitations, SOB, urinary symptoms, HA, dizziness, blurry vision, Abd pain, Pt still makes significant amount of urine. No LE edema, orthopnea. No recent IV contrast, no NSAID use, no decreased PO intake.      (25 Aug 2022 23:47)      PMHx:   Diabetes    Stage 5 chronic kidney disease not on chronic dialysis        PSHx:   No significant past surgical history        Allergies:  No Known Allergies      Home Meds:    Current Medications:   acetaminophen     Tablet .. 650 milliGRAM(s) Oral every 6 hours PRN  calcium acetate 1334 milliGRAM(s) Oral three times a day with meals  carvedilol 6.25 milliGRAM(s) Oral every 12 hours  dextrose 5%. 1000 milliLiter(s) IV Continuous <Continuous>  dextrose 5%. 1000 milliLiter(s) IV Continuous <Continuous>  dextrose 50% Injectable 25 Gram(s) IV Push once  dextrose 50% Injectable 12.5 Gram(s) IV Push once  dextrose 50% Injectable 25 Gram(s) IV Push once  dextrose Oral Gel 15 Gram(s) Oral once PRN  epoetin reilly-epbx (RETACRIT) Injectable 2000 Unit(s) IV Push every 7 days  furosemide   Injectable 40 milliGRAM(s) IV Push daily  glucagon  Injectable 1 milliGRAM(s) IntraMuscular once  hydrALAZINE 100 milliGRAM(s) Oral three times a day  insulin lispro (ADMELOG) corrective regimen sliding scale   SubCutaneous three times a day before meals  insulin lispro (ADMELOG) corrective regimen sliding scale   SubCutaneous at bedtime  iron sucrose IVPB 200 milliGRAM(s) IV Intermittent every 24 hours  sodium bicarbonate 1300 milliGRAM(s) Oral two times a day      FAMILY HISTORY:  FHx: diabetes mellitus (Father, Aunt)      Review of Systems:  REVIEW OF SYSTEMS:  CONSTITUTIONAL: No weakness, fevers or chills  EYES/ENT: No visual changes;  No dysphagia  NECK: No pain or stiffness  RESPIRATORY: No cough, wheezing, hemoptysis; No shortness of breath  CARDIOVASCULAR: No chest pain or palpitations; No lower extremity edema  GASTROINTESTINAL: No abdominal or epigastric pain. No nausea, vomiting, or diarrhea.  BACK: No back pain  GENITOURINARY: No dysuria, frequency or hematuria  NEUROLOGICAL: No numbness or weakness  SKIN: No itching, burning, rashes, or lesions     Physical Exam:  T(F): 98 (08-30), Max: 98.1 (08-29)  HR: 61 (08-30) (56 - 61)  BP: 161/60 (08-30) (152/62 - 168/65)  RR: 17 (08-30)  SpO2: 99% (08-30)  GENERAL: No acute distress, well-developed  HEAD:  Atraumatic, Normocephalic  ENT: EOMI, PERRLA, conjunctiva and sclera clear, Neck supple, No JVD, moist mucosa  CHEST/LUNG: Clear to auscultation bilaterally; No wheeze, equal breath sounds bilaterally   BACK: No spinal tenderness  HEART: Regular rate and rhythm; No murmurs, rubs, or gallops  ABDOMEN: Soft, Nontender, Nondistended; Bowel sounds present  EXTREMITIES:  No clubbing, cyanosis, or edema  PSYCH: Nl behavior, nl affect  NEUROLOGY: AAOx3, non-focal, cranial nerves intact  SKIN: Normal color, No rashes or lesions      CXR: Personally reviewed    Labs: Personally reviewed                        9.2    5.58  )-----------( 202      ( 30 Aug 2022 08:06 )             28.7     08-30    137  |  100  |  84<H>  ----------------------------<  157<H>  4.2   |  22  |  6.30<H>    Ca    8.6      30 Aug 2022 08:06  Phos  6.3     08-30  Mg     2.30     08-30

## 2022-08-30 NOTE — DIETITIAN INITIAL EVALUATION ADULT - ADD RECOMMEND
1) Recommend add Nepro 2x daily (850 kcals, 38.2g protein).   2) Monitor PO intake and document % intake.   3) Monitor BM, PO intake, Labs, weights, and skin integrity.   4) Nutrition education provided on foods choices on low Potassium and Phosphorus diet, protein sources, nutrition label reading, and food portion size. Encourage pt consume a balanced diet with adequate protein and fibers.

## 2022-08-30 NOTE — DIETITIAN INITIAL EVALUATION ADULT - OTHER INFO
Per chart review, 72 y/o M with hx of CKD5 not on dialysis, DM, HTN, currently on renal transplant list, sent by PMD for peaked T waves on EKG and hypertension, found to be hyperkalemic with mild metabolic acidosis, admitted for management. Pending AVF placement for eventual HD.    Pt seen at bedside. Reported good appetite, able to consume % of his meals. Breakfast tray noted with 75% of intake. Patient noted with bottles of Ensure original brought by his family member. Patient consumes 1-2 bottles of Ensure Original daily. Recommend change to Nepro 2x daily (850 kcals, 38.2g protein). Labs notably with Phos 6.3H currently ordered PhosLo. Denies chewing and swallowing difficulties. Denies any GI distress (nausea/vomiting/diarrhea/constipation.)

## 2022-08-30 NOTE — DIETITIAN INITIAL EVALUATION ADULT - NS FNS DIET ORDER
Diet, Renal Restrictions:   For patients receiving Renal Replacement - No Protein Restr, No Conc K, No Conc Phos, Low Sodium  Consistent Carbohydrate {No Snacks} (CSTCHO)  DASH/TLC {Sodium & Cholesterol Restricted} (DASH)  No Fish (08-29-22 @ 13:01)

## 2022-08-31 ENCOUNTER — TRANSCRIPTION ENCOUNTER (OUTPATIENT)
Age: 72
End: 2022-08-31

## 2022-08-31 LAB
ANION GAP SERPL CALC-SCNC: 16 MMOL/L — HIGH (ref 7–14)
BUN SERPL-MCNC: 91 MG/DL — HIGH (ref 7–23)
CALCIUM SERPL-MCNC: 8.4 MG/DL — SIGNIFICANT CHANGE UP (ref 8.4–10.5)
CHLORIDE SERPL-SCNC: 100 MMOL/L — SIGNIFICANT CHANGE UP (ref 98–107)
CO2 SERPL-SCNC: 22 MMOL/L — SIGNIFICANT CHANGE UP (ref 22–31)
CREAT SERPL-MCNC: 6.48 MG/DL — HIGH (ref 0.5–1.3)
EGFR: 9 ML/MIN/1.73M2 — LOW
GLUCOSE BLDC GLUCOMTR-MCNC: 122 MG/DL — HIGH (ref 70–99)
GLUCOSE BLDC GLUCOMTR-MCNC: 181 MG/DL — HIGH (ref 70–99)
GLUCOSE BLDC GLUCOMTR-MCNC: 201 MG/DL — HIGH (ref 70–99)
GLUCOSE BLDC GLUCOMTR-MCNC: 223 MG/DL — HIGH (ref 70–99)
GLUCOSE SERPL-MCNC: 167 MG/DL — HIGH (ref 70–99)
HCT VFR BLD CALC: 27.6 % — LOW (ref 39–50)
HGB BLD-MCNC: 9 G/DL — LOW (ref 13–17)
MAGNESIUM SERPL-MCNC: 2.2 MG/DL — SIGNIFICANT CHANGE UP (ref 1.6–2.6)
MCHC RBC-ENTMCNC: 21.3 PG — LOW (ref 27–34)
MCHC RBC-ENTMCNC: 32.6 GM/DL — SIGNIFICANT CHANGE UP (ref 32–36)
MCV RBC AUTO: 65.2 FL — LOW (ref 80–100)
NRBC # BLD: 0 /100 WBCS — SIGNIFICANT CHANGE UP (ref 0–0)
NRBC # FLD: 0 K/UL — SIGNIFICANT CHANGE UP (ref 0–0)
PHOSPHATE SERPL-MCNC: 5.4 MG/DL — HIGH (ref 2.5–4.5)
PLATELET # BLD AUTO: 201 K/UL — SIGNIFICANT CHANGE UP (ref 150–400)
POTASSIUM SERPL-MCNC: 4.1 MMOL/L — SIGNIFICANT CHANGE UP (ref 3.5–5.3)
POTASSIUM SERPL-SCNC: 4.1 MMOL/L — SIGNIFICANT CHANGE UP (ref 3.5–5.3)
RBC # BLD: 4.23 M/UL — SIGNIFICANT CHANGE UP (ref 4.2–5.8)
RBC # FLD: 16.4 % — HIGH (ref 10.3–14.5)
SARS-COV-2 RNA SPEC QL NAA+PROBE: SIGNIFICANT CHANGE UP
SODIUM SERPL-SCNC: 138 MMOL/L — SIGNIFICANT CHANGE UP (ref 135–145)
WBC # BLD: 5.94 K/UL — SIGNIFICANT CHANGE UP (ref 3.8–10.5)
WBC # FLD AUTO: 5.94 K/UL — SIGNIFICANT CHANGE UP (ref 3.8–10.5)

## 2022-08-31 PROCEDURE — 93306 TTE W/DOPPLER COMPLETE: CPT | Mod: 26

## 2022-08-31 PROCEDURE — 99232 SBSQ HOSP IP/OBS MODERATE 35: CPT

## 2022-08-31 PROCEDURE — 99233 SBSQ HOSP IP/OBS HIGH 50: CPT | Mod: GC

## 2022-08-31 PROCEDURE — 99222 1ST HOSP IP/OBS MODERATE 55: CPT

## 2022-08-31 RX ORDER — SODIUM CHLORIDE 9 MG/ML
1000 INJECTION, SOLUTION INTRAVENOUS
Refills: 0 | Status: DISCONTINUED | OUTPATIENT
Start: 2022-08-31 | End: 2022-08-31

## 2022-08-31 RX ADMIN — Medication 2: at 12:33

## 2022-08-31 RX ADMIN — Medication 40 MILLIGRAM(S): at 05:43

## 2022-08-31 RX ADMIN — Medication 100 MILLIGRAM(S): at 12:44

## 2022-08-31 RX ADMIN — Medication 100 MILLIGRAM(S): at 05:43

## 2022-08-31 RX ADMIN — Medication 1334 MILLIGRAM(S): at 08:56

## 2022-08-31 RX ADMIN — CARVEDILOL PHOSPHATE 6.25 MILLIGRAM(S): 80 CAPSULE, EXTENDED RELEASE ORAL at 05:43

## 2022-08-31 RX ADMIN — IRON SUCROSE 110 MILLIGRAM(S): 20 INJECTION, SOLUTION INTRAVENOUS at 18:46

## 2022-08-31 RX ADMIN — Medication 1300 MILLIGRAM(S): at 05:43

## 2022-08-31 RX ADMIN — CARVEDILOL PHOSPHATE 6.25 MILLIGRAM(S): 80 CAPSULE, EXTENDED RELEASE ORAL at 18:46

## 2022-08-31 RX ADMIN — Medication 1: at 08:57

## 2022-08-31 RX ADMIN — Medication 1300 MILLIGRAM(S): at 18:46

## 2022-08-31 RX ADMIN — Medication 100 MILLIGRAM(S): at 22:25

## 2022-08-31 RX ADMIN — Medication 120 MILLIGRAM(S): at 05:43

## 2022-08-31 RX ADMIN — Medication 1334 MILLIGRAM(S): at 12:34

## 2022-08-31 NOTE — PROGRESS NOTE ADULT - ASSESSMENT
70 y/o M w/ ESRD requiring AVF/AVG planning. Vein mapping Reviewed.    Plan:  - plan for RUE AVF on Thu 9/1  - please continue to protect RUE (no IVs, BP, blood draws, etc)  - appreciate medical and cardiac clearance for the procedure  - please assess if pt will need dialysis on Wed 8/31 prior to procedure   - consent in the chart   - please obtain preop labs on the morning of 9/1 at 4am  - consider gentle IVF while patient NPO overnight     Vascular Surgery  [id number]

## 2022-08-31 NOTE — PROVIDER CONTACT NOTE (OTHER) - SITUATION
Patient HR 51
Patient /54 HR 57
patient BP is 141/47. patients 22:00 hydralazine held as diastolic BP is below medications parameters. will continue to monitor BP.
patients /56. 22:00 dose of hydralazine held as per medications diastolic parameters. will continue to monitor BP

## 2022-08-31 NOTE — PROGRESS NOTE ADULT - ASSESSMENT
72 y/o M with hx of CKD5 not on dialysis, DM, HTN, currently on renal transplant list, sent by PMD for peaked T waves on EKG and hypertension, found to be hyperkalemic with mild metabolic acidosis, admitted for management. Pending AVF placement for eventual HD.

## 2022-08-31 NOTE — PROVIDER CONTACT NOTE (OTHER) - ACTION/TREATMENT ORDERED:
will continue to monitor BP.
ACP made aware. BP not in range for hydralazine medication, reassess BP in an hour.
ACP made aware. Continue to monitor on tele.
will continue to monitor BP

## 2022-08-31 NOTE — PROVIDER CONTACT NOTE (OTHER) - BACKGROUND
Patient admitted for hyperkalemia. PMH DM and CKD.
(Admit Diagnosis) Hyperkalemia  (PMH) Stage 5 chronic kidney disease not on chronic dialysis  (PMH) Diabetes  (Principal DC/DX) Hyperkalemia  (Problem/DX) Acidosis
Patient admitted for hyperkalemia. PMH DM and CKD.
(Admit Diagnosis) Hyperkalemia  (PMH) Stage 5 chronic kidney disease not on chronic dialysis  (PMH) Diabetes  (Principal DC/DX) Hyperkalemia

## 2022-08-31 NOTE — PROVIDER CONTACT NOTE (OTHER) - REASON
Patient HR 51
hydralazine held as per BP parameters
Patient /54 HR 57
22:00 hydralazine held for parameters

## 2022-08-31 NOTE — PROVIDER CONTACT NOTE (OTHER) - ASSESSMENT
patient BP is 141/47. patients 22:00 hydralazine held as diastolic BP is below medications parameters. will continue to monitor BP. ACP Jo Ann Gordon notified.
patients /56. 22:00 dose of hydralazine held as per medications diastolic parameters. will continue to monitor BP. ACP Jo Ann Gordon notified
Patient A&Ox4, VS as documented, NSR/SB on tele. Patient denies chest pain and SOB.
Patient A&Ox4, VS as documented, NSR/SB on tele. Patient denies chest pain and SOB.

## 2022-08-31 NOTE — PROGRESS NOTE ADULT - ATTENDING COMMENTS
Patient examined and ROS reviewed. A case of CKD stage V admitted with hyperkalemia and anemia in the setting of uncontrolled hypertension and diabetes. BP is better controlled now. Patient is being managed for hyperkalemia with diuresis and lokelma. Anemia and hyperphosphatemia is being managed with supplemental and chelating therapy. RRT has been discussed.
CKD stage V admitted with hyperkalemia and anemia in the setting of uncontrolled hypertension and diabetes.   BP acceptable on BP meds and addition of Nifedipine.   improving hyperkalemia with diuresis and lokelma. continue diuresis  Anemia and hyperphosphatemia plan as outlined above. on  Iv Venofer with EPO  awaiting AVF placement 9/1 per my conversation with Dr. Lockett .  avoid contrast studies, ACE-I, ARB, or NSAIDs    Further detailed plan of management and recommendation as outlined above by the renal fellow.
CKD stage V admitted with hyperkalemia and anemia in the setting of uncontrolled hypertension and diabetes.   BP high. increase Nifedipine as outlined above   improving hyperkalemia with diuresis and lokelma. continue diuresis  Anemia and hyperphosphatemia plan as outlined above. start Iv Venofer with EPO  awaiting AVF placement 9/1 per my conversation with Dr. Lockett this am   avoid contrast studies, ACE-I, ARB, or NSAIDs    Further detailed plan of management and recommendation as outlined above by the renal fellow.
CKD stage V admitted with hyperkalemia and anemia in the setting of uncontrolled hypertension and diabetes.   BP is better controlled now.  improving hyperkalemia with diuresis and lokelma.   Anemia and hyperphosphatemia plan as outlined above   awaiting AVF placement   continue diuresis  avoid contrast studies, ACE-I, ARB, or NSAIDs    Further detailed plan of management and recommendation as outlined above by the renal fellow.

## 2022-09-01 LAB
ALBUMIN SERPL ELPH-MCNC: 3.5 G/DL — SIGNIFICANT CHANGE UP (ref 3.3–5)
ALP SERPL-CCNC: 71 U/L — SIGNIFICANT CHANGE UP (ref 40–120)
ALT FLD-CCNC: 10 U/L — SIGNIFICANT CHANGE UP (ref 4–41)
ANION GAP SERPL CALC-SCNC: 15 MMOL/L — HIGH (ref 7–14)
AST SERPL-CCNC: 9 U/L — SIGNIFICANT CHANGE UP (ref 4–40)
BASOPHILS # BLD AUTO: 0.03 K/UL — SIGNIFICANT CHANGE UP (ref 0–0.2)
BASOPHILS NFR BLD AUTO: 0.4 % — SIGNIFICANT CHANGE UP (ref 0–2)
BILIRUB SERPL-MCNC: 0.3 MG/DL — SIGNIFICANT CHANGE UP (ref 0.2–1.2)
BLD GP AB SCN SERPL QL: NEGATIVE — SIGNIFICANT CHANGE UP
BUN SERPL-MCNC: 90 MG/DL — HIGH (ref 7–23)
CALCIUM SERPL-MCNC: 8.8 MG/DL — SIGNIFICANT CHANGE UP (ref 8.4–10.5)
CHLORIDE SERPL-SCNC: 98 MMOL/L — SIGNIFICANT CHANGE UP (ref 98–107)
CO2 SERPL-SCNC: 21 MMOL/L — LOW (ref 22–31)
CREAT SERPL-MCNC: 6.3 MG/DL — HIGH (ref 0.5–1.3)
EGFR: 9 ML/MIN/1.73M2 — LOW
EOSINOPHIL # BLD AUTO: 0.39 K/UL — SIGNIFICANT CHANGE UP (ref 0–0.5)
EOSINOPHIL NFR BLD AUTO: 4.9 % — SIGNIFICANT CHANGE UP (ref 0–6)
GLUCOSE BLDC GLUCOMTR-MCNC: 140 MG/DL — HIGH (ref 70–99)
GLUCOSE BLDC GLUCOMTR-MCNC: 147 MG/DL — HIGH (ref 70–99)
GLUCOSE BLDC GLUCOMTR-MCNC: 158 MG/DL — HIGH (ref 70–99)
GLUCOSE BLDC GLUCOMTR-MCNC: 198 MG/DL — HIGH (ref 70–99)
GLUCOSE SERPL-MCNC: 138 MG/DL — HIGH (ref 70–99)
HCT VFR BLD CALC: 28.5 % — LOW (ref 39–50)
HGB BLD-MCNC: 9.2 G/DL — LOW (ref 13–17)
IANC: 5.78 K/UL — SIGNIFICANT CHANGE UP (ref 1.8–7.4)
IMM GRANULOCYTES NFR BLD AUTO: 0.1 % — SIGNIFICANT CHANGE UP (ref 0–1.5)
INR BLD: 1.09 RATIO — SIGNIFICANT CHANGE UP (ref 0.88–1.16)
LYMPHOCYTES # BLD AUTO: 0.99 K/UL — LOW (ref 1–3.3)
LYMPHOCYTES # BLD AUTO: 12.4 % — LOW (ref 13–44)
MAGNESIUM SERPL-MCNC: 2.3 MG/DL — SIGNIFICANT CHANGE UP (ref 1.6–2.6)
MCHC RBC-ENTMCNC: 21.3 PG — LOW (ref 27–34)
MCHC RBC-ENTMCNC: 32.3 GM/DL — SIGNIFICANT CHANGE UP (ref 32–36)
MCV RBC AUTO: 66 FL — LOW (ref 80–100)
MONOCYTES # BLD AUTO: 0.78 K/UL — SIGNIFICANT CHANGE UP (ref 0–0.9)
MONOCYTES NFR BLD AUTO: 9.8 % — SIGNIFICANT CHANGE UP (ref 2–14)
NEUTROPHILS # BLD AUTO: 5.78 K/UL — SIGNIFICANT CHANGE UP (ref 1.8–7.4)
NEUTROPHILS NFR BLD AUTO: 72.4 % — SIGNIFICANT CHANGE UP (ref 43–77)
NRBC # BLD: 0 /100 WBCS — SIGNIFICANT CHANGE UP (ref 0–0)
NRBC # FLD: 0 K/UL — SIGNIFICANT CHANGE UP (ref 0–0)
PHOSPHATE SERPL-MCNC: 4.9 MG/DL — HIGH (ref 2.5–4.5)
PLATELET # BLD AUTO: 218 K/UL — SIGNIFICANT CHANGE UP (ref 150–400)
POTASSIUM SERPL-MCNC: 4.1 MMOL/L — SIGNIFICANT CHANGE UP (ref 3.5–5.3)
POTASSIUM SERPL-SCNC: 4.1 MMOL/L — SIGNIFICANT CHANGE UP (ref 3.5–5.3)
PROT SERPL-MCNC: 6.1 G/DL — SIGNIFICANT CHANGE UP (ref 6–8.3)
PROTHROM AB SERPL-ACNC: 12.7 SEC — SIGNIFICANT CHANGE UP (ref 10.5–13.4)
RBC # BLD: 4.32 M/UL — SIGNIFICANT CHANGE UP (ref 4.2–5.8)
RBC # FLD: 16.4 % — HIGH (ref 10.3–14.5)
RH IG SCN BLD-IMP: POSITIVE — SIGNIFICANT CHANGE UP
SODIUM SERPL-SCNC: 134 MMOL/L — LOW (ref 135–145)
WBC # BLD: 7.98 K/UL — SIGNIFICANT CHANGE UP (ref 3.8–10.5)
WBC # FLD AUTO: 7.98 K/UL — SIGNIFICANT CHANGE UP (ref 3.8–10.5)

## 2022-09-01 PROCEDURE — 99233 SBSQ HOSP IP/OBS HIGH 50: CPT | Mod: GC

## 2022-09-01 PROCEDURE — 36821 AV FUSION DIRECT ANY SITE: CPT | Mod: RT

## 2022-09-01 DEVICE — SURGIFOAM PAD 8CM X 12.5CM X 2MM (100C): Type: IMPLANTABLE DEVICE | Site: LEFT | Status: FUNCTIONAL

## 2022-09-01 DEVICE — LIGATING CLIPS WECK HORIZON MEDIUM (BLUE) 24: Type: IMPLANTABLE DEVICE | Site: LEFT | Status: FUNCTIONAL

## 2022-09-01 DEVICE — SURGICEL FIBRILLAR 2 X 4": Type: IMPLANTABLE DEVICE | Site: LEFT | Status: FUNCTIONAL

## 2022-09-01 DEVICE — LIGATING CLIPS WECK HORIZON SMALL-WIDE (RED) 24: Type: IMPLANTABLE DEVICE | Site: LEFT | Status: FUNCTIONAL

## 2022-09-01 RX ORDER — HEPARIN SODIUM 5000 [USP'U]/ML
5000 INJECTION INTRAVENOUS; SUBCUTANEOUS EVERY 8 HOURS
Refills: 0 | Status: DISCONTINUED | OUTPATIENT
Start: 2022-09-01 | End: 2022-09-02

## 2022-09-01 RX ORDER — CARVEDILOL PHOSPHATE 80 MG/1
12.5 CAPSULE, EXTENDED RELEASE ORAL EVERY 12 HOURS
Refills: 0 | Status: DISCONTINUED | OUTPATIENT
Start: 2022-09-01 | End: 2022-09-02

## 2022-09-01 RX ORDER — HYDRALAZINE HCL 50 MG
10 TABLET ORAL ONCE
Refills: 0 | Status: COMPLETED | OUTPATIENT
Start: 2022-09-01 | End: 2022-09-01

## 2022-09-01 RX ORDER — HYDROMORPHONE HYDROCHLORIDE 2 MG/ML
0.5 INJECTION INTRAMUSCULAR; INTRAVENOUS; SUBCUTANEOUS
Refills: 0 | Status: DISCONTINUED | OUTPATIENT
Start: 2022-09-01 | End: 2022-09-01

## 2022-09-01 RX ORDER — FUROSEMIDE 40 MG
40 TABLET ORAL
Refills: 0 | Status: DISCONTINUED | OUTPATIENT
Start: 2022-09-02 | End: 2022-09-02

## 2022-09-01 RX ORDER — HYDRALAZINE HCL 50 MG
100 TABLET ORAL ONCE
Refills: 0 | Status: COMPLETED | OUTPATIENT
Start: 2022-09-01 | End: 2022-09-01

## 2022-09-01 RX ADMIN — Medication 40 MILLIGRAM(S): at 05:56

## 2022-09-01 RX ADMIN — Medication 100 MILLIGRAM(S): at 05:54

## 2022-09-01 RX ADMIN — Medication 100 MILLIGRAM(S): at 22:19

## 2022-09-01 RX ADMIN — Medication 50 MILLIGRAM(S): at 18:42

## 2022-09-01 RX ADMIN — Medication 120 MILLIGRAM(S): at 05:54

## 2022-09-01 RX ADMIN — Medication 10 MILLIGRAM(S): at 17:10

## 2022-09-01 RX ADMIN — Medication 1: at 09:08

## 2022-09-01 RX ADMIN — HEPARIN SODIUM 5000 UNIT(S): 5000 INJECTION INTRAVENOUS; SUBCUTANEOUS at 22:19

## 2022-09-01 RX ADMIN — CARVEDILOL PHOSPHATE 12.5 MILLIGRAM(S): 80 CAPSULE, EXTENDED RELEASE ORAL at 18:29

## 2022-09-01 RX ADMIN — Medication 50 MILLIGRAM(S): at 18:38

## 2022-09-01 RX ADMIN — CARVEDILOL PHOSPHATE 6.25 MILLIGRAM(S): 80 CAPSULE, EXTENDED RELEASE ORAL at 05:54

## 2022-09-01 RX ADMIN — Medication 1300 MILLIGRAM(S): at 05:55

## 2022-09-01 RX ADMIN — Medication 1: at 16:52

## 2022-09-02 ENCOUNTER — TRANSCRIPTION ENCOUNTER (OUTPATIENT)
Age: 72
End: 2022-09-02

## 2022-09-02 VITALS
SYSTOLIC BLOOD PRESSURE: 146 MMHG | RESPIRATION RATE: 16 BRPM | DIASTOLIC BLOOD PRESSURE: 60 MMHG | TEMPERATURE: 98 F | HEART RATE: 65 BPM | OXYGEN SATURATION: 98 %

## 2022-09-02 LAB
ANION GAP SERPL CALC-SCNC: 16 MMOL/L — HIGH (ref 7–14)
BUN SERPL-MCNC: 85 MG/DL — HIGH (ref 7–23)
CALCIUM SERPL-MCNC: 8.5 MG/DL — SIGNIFICANT CHANGE UP (ref 8.4–10.5)
CHLORIDE SERPL-SCNC: 98 MMOL/L — SIGNIFICANT CHANGE UP (ref 98–107)
CO2 SERPL-SCNC: 22 MMOL/L — SIGNIFICANT CHANGE UP (ref 22–31)
CREAT SERPL-MCNC: 6.26 MG/DL — HIGH (ref 0.5–1.3)
EGFR: 9 ML/MIN/1.73M2 — LOW
GLUCOSE BLDC GLUCOMTR-MCNC: 149 MG/DL — HIGH (ref 70–99)
GLUCOSE BLDC GLUCOMTR-MCNC: 155 MG/DL — HIGH (ref 70–99)
GLUCOSE BLDC GLUCOMTR-MCNC: 277 MG/DL — HIGH (ref 70–99)
GLUCOSE BLDC GLUCOMTR-MCNC: 286 MG/DL — HIGH (ref 70–99)
GLUCOSE SERPL-MCNC: 147 MG/DL — HIGH (ref 70–99)
HCT VFR BLD CALC: 28.6 % — LOW (ref 39–50)
HGB BLD-MCNC: 9.4 G/DL — LOW (ref 13–17)
MAGNESIUM SERPL-MCNC: 2.2 MG/DL — SIGNIFICANT CHANGE UP (ref 1.6–2.6)
MCHC RBC-ENTMCNC: 21.7 PG — LOW (ref 27–34)
MCHC RBC-ENTMCNC: 32.9 GM/DL — SIGNIFICANT CHANGE UP (ref 32–36)
MCV RBC AUTO: 65.9 FL — LOW (ref 80–100)
NRBC # BLD: 0 /100 WBCS — SIGNIFICANT CHANGE UP (ref 0–0)
NRBC # FLD: 0 K/UL — SIGNIFICANT CHANGE UP (ref 0–0)
PHOSPHATE SERPL-MCNC: 5.1 MG/DL — HIGH (ref 2.5–4.5)
PLATELET # BLD AUTO: 215 K/UL — SIGNIFICANT CHANGE UP (ref 150–400)
POTASSIUM SERPL-MCNC: 4 MMOL/L — SIGNIFICANT CHANGE UP (ref 3.5–5.3)
POTASSIUM SERPL-SCNC: 4 MMOL/L — SIGNIFICANT CHANGE UP (ref 3.5–5.3)
RBC # BLD: 4.34 M/UL — SIGNIFICANT CHANGE UP (ref 4.2–5.8)
RBC # FLD: 16.4 % — HIGH (ref 10.3–14.5)
SODIUM SERPL-SCNC: 136 MMOL/L — SIGNIFICANT CHANGE UP (ref 135–145)
WBC # BLD: 7.37 K/UL — SIGNIFICANT CHANGE UP (ref 3.8–10.5)
WBC # FLD AUTO: 7.37 K/UL — SIGNIFICANT CHANGE UP (ref 3.8–10.5)

## 2022-09-02 PROCEDURE — 99239 HOSP IP/OBS DSCHRG MGMT >30: CPT

## 2022-09-02 PROCEDURE — 99233 SBSQ HOSP IP/OBS HIGH 50: CPT | Mod: GC

## 2022-09-02 RX ORDER — CALCIUM ACETATE 667 MG
2 TABLET ORAL
Qty: 120 | Refills: 0 | DISCHARGE
Start: 2022-09-02 | End: 2022-10-01

## 2022-09-02 RX ORDER — SODIUM BICARBONATE 1 MEQ/ML
2 SYRINGE (ML) INTRAVENOUS
Qty: 120 | Refills: 0
Start: 2022-09-02 | End: 2022-10-01

## 2022-09-02 RX ORDER — NIFEDIPINE 30 MG
2 TABLET, EXTENDED RELEASE 24 HR ORAL
Qty: 60 | Refills: 0
Start: 2022-09-02 | End: 2022-10-01

## 2022-09-02 RX ORDER — ACETAMINOPHEN 500 MG
2 TABLET ORAL
Qty: 0 | Refills: 0 | DISCHARGE
Start: 2022-09-02

## 2022-09-02 RX ORDER — CALCIUM ACETATE 667 MG
2 TABLET ORAL
Qty: 120 | Refills: 0
Start: 2022-09-02 | End: 2022-10-01

## 2022-09-02 RX ORDER — CARVEDILOL PHOSPHATE 80 MG/1
1 CAPSULE, EXTENDED RELEASE ORAL
Qty: 60 | Refills: 0
Start: 2022-09-02 | End: 2022-10-01

## 2022-09-02 RX ADMIN — Medication 3: at 13:12

## 2022-09-02 RX ADMIN — Medication 40 MILLIGRAM(S): at 13:13

## 2022-09-02 RX ADMIN — Medication 100 MILLIGRAM(S): at 05:56

## 2022-09-02 RX ADMIN — Medication 650 MILLIGRAM(S): at 13:13

## 2022-09-02 RX ADMIN — Medication 1334 MILLIGRAM(S): at 13:12

## 2022-09-02 RX ADMIN — Medication 1334 MILLIGRAM(S): at 09:12

## 2022-09-02 RX ADMIN — CARVEDILOL PHOSPHATE 12.5 MILLIGRAM(S): 80 CAPSULE, EXTENDED RELEASE ORAL at 05:56

## 2022-09-02 RX ADMIN — Medication 1300 MILLIGRAM(S): at 05:56

## 2022-09-02 RX ADMIN — HEPARIN SODIUM 5000 UNIT(S): 5000 INJECTION INTRAVENOUS; SUBCUTANEOUS at 05:56

## 2022-09-02 RX ADMIN — Medication 120 MILLIGRAM(S): at 05:55

## 2022-09-02 RX ADMIN — HEPARIN SODIUM 5000 UNIT(S): 5000 INJECTION INTRAVENOUS; SUBCUTANEOUS at 13:16

## 2022-09-02 RX ADMIN — Medication 100 MILLIGRAM(S): at 13:15

## 2022-09-02 RX ADMIN — Medication 40 MILLIGRAM(S): at 05:56

## 2022-09-02 NOTE — PROGRESS NOTE ADULT - NSPROGADDITIONALINFOA_GEN_ALL_CORE
CKD stage V admitted with hyperkalemia and anemia in the setting of uncontrolled hypertension and diabetes.   BP could be better. increase coreg   improving hyperkalemia with diuresis and lokelma. continue diuresis. switch to po BID in am   Anemia and hyperphosphatemia plan as outlined above. on  Iv Venofer with EPO   AVF placement today 9/1 per my conversation with Dr. Lockett .  avoid contrast studies, ACE-I, ARB, or NSAIDs
CKD stage V admitted with hyperkalemia and anemia in the setting of uncontrolled hypertension and diabetes.   BP acceptable   improving hyperkalemia .continue diuresis. s  Anemia and hyperphosphatemia plan as outlined above. on  Iv Venofer with EPO   AVF placement on  9/1   avoid contrast studies, ACE-I, ARB, or NSAIDs  no objection to DC   should f/u with Dr. Minor in one week

## 2022-09-02 NOTE — PROGRESS NOTE ADULT - PROBLEM SELECTOR PLAN 1
Pt with progression of CKD  - nephrology following, appreciate recs  - no indication for acute HD  - s/p AVF creation on 9/1 by vascular surgery  - c/w sodium bicarb 1300mg PO BID  - c/w Phoslo 1334mg PO TID  - continue to monitor BMP  - outpatient follow up with Dr. Minor in 1 week
Pt. is CKD stage 5 likely secondary to long standing DM and HTN. Pt. initially used to follow Dr. Mcwilliams and now follows with Dr. Amrita Minor (nephrologist). last visit on 8/1/22. Scr was elevated at 4.8 on 8/1/22. Pt. was made aware that he will need dialysis in near future. Scr was elevated at 5.15 on admission on 8/25/22. Scr is elevated at 6.12 today (8/29/22). Pt. is non oliguric, non uremic and not in fluid overload. No urgent indication for HD today. Pt. will likely need dialysis in near future. Will assess need for HD daily. Vascular note regarding AVF creation reviewed. Monitor labs and urine output. Avoid any potential nephrotoxins. Dose medications as per eGFR.
Pt. is CKD stage 5 likely secondary to long standing DM and HTN. Pt. initially used to follow Dr. Mcwilliams and now follows with Dr. Amrita Minor (nephrologist). last visit on 8/1/22. Scr was elevated at 4.8 on 8/1/22. Pt. was made aware that he will need dialysis in near future. Scr was elevated at 5.15 on admission on 8/25/22. Scr is elevated at 6.3 today . Pt. is non oliguric, non uremic and not in fluid overload. No urgent indication for HD. Pt. will likely need dialysis in near future. Will assess need for HD daily. s/p AVF creation 9/1   Recommend continuing lasix 40mg po BID   Monitor labs and urine output. Avoid any potential nephrotoxins.   Dose medications as per eGFR.
Pt w/ hyperkalemia to 5.8 with peaked T waves in the setting of progressive kidney disease with metabolic acidosis.  - now resolved s/p insulin, dextrose, and lokelma in ED
Pt. is CKD stage 5 likely secondary to long standing DM and HTN. Pt. initially used to follow Dr. Mcwilliams and now follows with Dr. Amrita Minor (nephrologist). last visit on 8/1/22. Scr was elevated at 4.8 on 8/1/22. Pt. was made aware that he will need dialysis in near future. Scr was elevated at 5.15 on admission on 8/25/22. Scr is elevated at 6.30 today (8/30/22). Pt. is non oliguric, non uremic and not in fluid overload. No urgent indication for HD today. Pt. will likely need dialysis in near future. Will assess need for HD daily. Vascular note regarding AVF creation reviewed. Recommend continuing lasix 40 mg IVP daily. Monitor labs and urine output. Avoid any potential nephrotoxins. Dose medications as per eGFR.
Pt w/ hyperkalemia to 5.8 with peaked T waves in the setting of progressive kidney disease with metabolic acidosis.  - now resolved s/p insulin, dextrose, and lokelma in ED  - no emergent need for HD at this time
Pt. is CKD stage 5 likely secondary to long standing DM and HTN. Pt. initially used to follow Dr. Mwcilliams and now follows with Dr. Amrita Minor (nephrologist). last visit on 8/1/22. Scr was elevated at 4.8 on 8/1/22. Pt. was made aware that he will need dialysis in near future. Scr was elevated at 5.15 on admission on 8/25/22. Scr is elevated at 6.3 today . Pt. is non oliguric, non uremic and not in fluid overload. No urgent indication for HD today. Pt. will likely need dialysis in near future. Will assess need for HD daily. awaiting AVF creation .   Recommend continuing lasix 40 mg IVP daily. can change to 40mg po BID in am   Monitor labs and urine output. Avoid any potential nephrotoxins.   Dose medications as per eGFR.
Pt w/ progressive kidney disease, now with hyperkalemia and mild metabolic acidosis to 7.24, bicarb 21. SCr on admission 5.5, last known 4.72 on 8/10. More likely progression of CKD  - nephrology following, appreciate recs  - vascular surgery consulted for AVF, tentative plan for surgery on 9/1  - c/w sodium bicarb 1300mg PO BID  - started Phoslo 1334mg PO TID for hyperphosphatemia  - BP better controlled  - continue to monitor BMP
Pt w/ hyperkalemia to 5.8 with peaked T waves in the setting of progressive kidney disease with metabolic acidosis.  - now resolved s/p insulin, dextrose, and lokelma in ED
Pt with progression of CKD  - nephrology following, appreciate recs  - no indication for acute HD  - vascular surgery consulted for AVF, plan for surgery on 9/1  - c/w sodium bicarb 1300mg PO BID  - c/w Phoslo 1334mg PO TID  - continue to monitor BMP
Pt. is CKD stage 5 likely secondary to long standing DM and HTN. Pt. initially used to follow Dr. Mcwilliams and now follows with Dr. Amrita Minor (nephrologist). last visit on 8/1/22. Scr was elevated at 4.8 on 8/1/22. Pt. was made aware that he will need dialysis in near future. Scr was elevated at 5.15 on admission on 8/25/22. Scr is elevated at 5.8 today (8/27/22). Pt. is non oliguric, non uremic and not in fluid overload. No urgent indication for HD today. Pt. will likely need dialysis in near future. Will assess need for HD daily. Vascular note regarding AVF creation reviewed. Monitor labs and urine output. Avoid any potential nephrotoxins. Dose medications as per eGFR.
Pt. is CKD stage 5 likely secondary to long standing DM and HTN. Pt. initially used to follow Dr. Mcwilliams and now follows with Dr. Amrita Minor (nephrologist). last visit on 8/1/22. Scr was elevated at 4.8 on 8/1/22. Pt. was made aware that he will need dialysis in near future. Scr was elevated at 5.15 on admission on 8/25/22. Scr is elevated at 6.48 today (8/31/22). Pt. is non oliguric, non uremic and not in fluid overload. No urgent indication for HD today. Pt. will likely need dialysis in near future. Will assess need for HD daily. Vascular note regarding AVF creation reviewed. Recommend continuing lasix 40 mg IVP daily. Monitor labs and urine output. Avoid any potential nephrotoxins. Dose medications as per eGFR.
Pt with progression of CKD  - nephrology following, appreciate recs  - no indication for acute HD  - vascular surgery consulted for AVF, tentative plan for surgery on 9/1  - c/w sodium bicarb 1300mg PO BID  - c/w Phoslo 1334mg PO TID  - continue to monitor BMP

## 2022-09-02 NOTE — PROGRESS NOTE ADULT - PROBLEM SELECTOR PLAN 6
Diet: Renal, consistent carb, DASH  DVT: IMPROVE score 1 for age, however patient is ambulating, will hold for now
Diet: Renal, consistent carb, DASH  DVT: IMPROVE score 1 for age, however patient is ambulating, will hold for now
BP better controlled now. Continue with nifedipine 90 mg , hydralazine 100 mg po TID and coreg 6.25 mg po BID. Continue lasix 40 mg IV OD. Continue holding off losartan. Monitor BP.     If you have any questions, please feel free to contact me  Chani Jalloh  Nephrology Fellow  [phone] / [employer] Teams(Preferred)  (After 5pm or on weekends please page the on-call fellow)
Continue with hydralazine 100 mg po TID, and Nifedipine 120 mg daily. and coreg 12.5po BID  Continue holding off losartan. Monitor BP.
Diet: Renal, consistent carb, DASH  DVT: IMPROVE score 1 for age, however patient is ambulating, will hold for now
BP better controlled now. Continue with hydralazine 100 mg po TID and coreg 6.25 mg po BID. Increase nifedipine to 120 mg daily. Continue lasix 40 mg IV OD. Continue holding off losartan. Monitor BP.     If you have any questions, please feel free to contact me  Chani Jalloh  Nephrology Fellow  [phone] / [employer] Teams(Preferred)  (After 5pm or on weekends please page the on-call fellow)
Diet: Renal, consistent carb, DASH  DVT: IMPROVE score 1 for age, however patient is ambulating, will hold for now
BP better controlled now. Continue with hydralazine 100 mg po TID, coreg 6.25 mg po BID, and Nifedipine 120 mg daily. Continue lasix 40 mg IV OD. Continue holding off losartan. Monitor BP.     If you have any questions, please feel free to contact me  Chani Jalloh  Nephrology Fellow  [phone] / [employer] Teams(Preferred)  (After 5pm or on weekends please page the on-call fellow)
Diet: Renal, consistent carb, DASH  DVT: IMPROVE score 1 for age, however patient is ambulating, will hold for now
Continue with hydralazine 100 mg po TID, and Nifedipine 120 mg daily.   increase coreg   Continue holding off losartan. Monitor BP.

## 2022-09-02 NOTE — PROGRESS NOTE ADULT - PROBLEM SELECTOR PLAN 5
Diet: Renal, consistent carb, DASH  DVT: IMPROVE score 1 for age, however patient is ambulating, will hold for now
Pt with hyperphosphatemia in the setting of CKD. Serum phos 4.9 Continue calcium acetate 1334 mg TID with meals. Monitor serum phosphorous and calcium.
BP better controlled now. Continue with nifedipine 90 mg , hydralazine 100 mg po TID and coreg 6.25 mg po BID. Continue lasix 40 mg IV OD. Continue holding off losartan. Monitor BP.     If you have any questions, please feel free to contact me  Elpidio Lopez  Nephrology Fellow  [phone]/ [employer] Teams(Preferred)  (After 5pm or on weekends please page the on-call fellow).
Diet: Renal, consistent carb, DASH  DVT: IMPROVE score 1 for age, however patient is ambulating, will hold for now  Dispo: D/c home today
Pt with hyperphosphatemia in the setting of CKD. Serum phos 5.4. Continue calcium acetate 1334 mg TID with meals. Monitor serum phosphorous and calcium.
Pt with hyperphosphatemia in the setting of CKD. Serum phos 6.3. Continue calcium [id number] mg TID with meals. Monitor serum phosphorous and calcium.
Pt with known anemia from renal failure/ anemia of chronic disease, with Hg 8.3 on 8/10 but now declined to 6.8  - no signs of bleeding  - continue to monitor  - f/u Hg electrophoresis  - today will transfuse 1 unit PRBC
Pt with hyperphosphatemia in the setting of CKD. Serum phos 5.1 Continue calcium acetate 1334 mg TID with meals. Monitor serum phosphorous and calcium.
Pt with known anemia from renal failure/ anemia of chronic disease, with Hg 8.3 on 8/10 but now declined to 6.8  - no signs of bleeding  - started on IV iron x5 days and Epogen  - continue to monitor  - s/p 1u PRBCs
Pt with hyperphosphatemia in the setting of CKD. Serum phos 6.3. Start calcium [id number] mg TID with meals. Monitor serum phosphorous and calcium.
Pt with known anemia from renal failure/ anemia of chronic disease, with Hg 8.3 on 8/10 but now declined to 6.8  - no signs of bleeding  - started on IV iron x5 days and Epogen  - continue to monitor  - s/p 1u PRBCs
Pt with known anemia from renal failure/ anemia of chronic disease, with Hg 8.3/28 on 8/10 but now progressed 7.2/23.2.   - no signs of bleeding  - low iron, normal ferritin  - microcytic anemia- check hemoglobin electrophoresis  - continue to monitor  - transfuse if Hgb<7
Pt with known anemia from renal failure/ anemia of chronic disease, with Hg 8.3/28 on 8/10 but now progressed 7.2/23.2.   - no signs of bleeding  - low iron, normal ferritin  - microcytic anemia- check hemoglobin electrophoresis  - continue to monitor  - transfuse if Hgb<7

## 2022-09-02 NOTE — PROGRESS NOTE ADULT - PROBLEM SELECTOR PLAN 2
Pt. with mild hyperkalemia in the setting of advanced CKD. ON admission, serum potassium was elevated at 5.8. Pt. received IV lasix 40 , insulin with D50, lokelma. Repeat Serum potassium was WNL at 4.3. Serum potassium WNl (5) today. Monitor serum potassium.
BP better  - c/w hydralazine 100mg PO TID, carvedilol 6.25mg PO BID, nifedipine 120mg PO daily  - c/w Lasix 40mg IV daily  - monitor BP
BP still above goal  - c/w hydralazine 100mg PO TID, carvedilol 6.25mg PO BID  - increase nifedipine to 120mg PO daily  - c/w Lasix 40mg IV to daily  - monitor BP
BP acceptable  - c/w hydralazine 100mg PO TID, carvedilol 12.5mg PO BID, nifedipine 120mg PO daily  - c/w Lasix 40mg PO BID  - monitor BP
Pt. with mild hyperkalemia in the setting of advanced CKD. ON admission, serum potassium was elevated at 5.8. Pt. received IV lasix 40 , insulin with D50, lokelma. Repeat Serum potassium was WNL at 4.3. Serum potassium WNl (4.3) today. Monitor serum potassium.
Pt. with mild hyperkalemia in the setting of advanced CKD. ON admission, serum potassium was elevated at 5.8. Pt. received IV lasix 40 , insulin with D50, lokelma. Serum K 4.1 WNL today Monitor serum potassium.
Pt. with mild hyperkalemia in the setting of advanced CKD. ON admission, serum potassium was elevated at 5.8. Pt. received IV lasix 40 , insulin with D50, lokelma.    WNL today Monitor serum potassium.  continue with lasix
Pt. with mild hyperkalemia in the setting of advanced CKD. ON admission, serum potassium was elevated at 5.8. Pt. received IV lasix 40 , insulin with D50, lokelma. Serum K 4.3 WNL today Monitor serum potassium.
BP improved  - c/w hydralazine 100mg PO TID, nifedipine 90mg PO qd, carvedilol 6.25mg PO BID  - decrease Lasix 40mg IV to daily  - monitor BP
Pt w/ progressive kidney disease, now with hyperkalemia and mild metabolic acidosis to 7.24, bicarb 21. SCr on admission 5.5, last known 4.72 on 8/10. More likely progression of CKD  - no emergent need for HD  - nephrology consulted, appreciate recs  - vascular surgery consulted for AVF eval  - started on sodium bicarb 650mg PO TID  - strict BP control  - continue to monitor
Pt w/ progressive kidney disease, now with hyperkalemia and mild metabolic acidosis to 7.24, bicarb 21. SCr on admission 5.5, last known 4.72 on 8/10. More likely progression of CKD  - nephrology consulted, appreciate recs  - vascular surgery consulted for AVF, likely next week after vein mapping; will discuss whether tunneled catheter is needed in short term based on renal function  - started on sodium bicarb 650mg PO TID  - strict BP control  - continue to monitor
Pt. with mild hyperkalemia in the setting of advanced CKD. ON admission, serum potassium was elevated at 5.8. Pt. received IV lasix 40 , insulin with D50, lokelma. Serum K 4.1 WNL today Monitor serum potassium.
Pt w/ progressive kidney disease, now with hyperkalemia and mild metabolic acidosis to 7.24, bicarb 21. SCr on admission 5.5, last known 4.72 on 8/10. More likely progression of CKD  - nephrology consulted, appreciate recs  - vascular surgery consulted for AVF, likely next week after vein mapping; will discuss whether tunneled catheter is needed in short term based on renal function  - started on sodium bicarb 650mg PO TID  - strict BP control  - continue to monitor

## 2022-09-02 NOTE — PROGRESS NOTE ADULT - PROBLEM SELECTOR PLAN 3
BP uncontrolled  - c/w hydralazine 100mg PO TID  - amlodipine d/valentin, started nifedipine 90mg PO qd  - added carvedilol 6.25mg PO BID  - started Lasix 40mg IV BID  - monitor BP q4h
Patient with anemia in the setting of ESRD. Hemoglobin below target range. Iron studies reviewed. Blood transfusion per primary team. Will start EPO wkly. Monitor hemoglobin.
Patient with anemia in the setting of ESRD. Hemoglobin below target range. Iron studies reviewed. Blood transfusion per primary team. Will start EPO wkly. Recommend starting venofer 200 mg daily for 5 days. Monitor hemoglobin.
Patient with anemia in the setting of ESRD. Hemoglobin below target range. Iron studies reviewed. Blood transfusion per primary team. Will start EPO wkly. Recommend starting venofer 200 mg daily for 5 days. Monitor hemoglobin.
Pt at home on Novolin 8 units in AM and 8 units in PM. Recent outpatient A1c 9.9.   - A1c 9.4%  - d/w pharmacy- recommend resume 80% of home insulin dose as inpatient which would be approximately Lantus 12u qHS and ISS qac  - FS acceptable, will hold off on starting Lantus at this time, monitor FS trend
Patient with anemia in the setting of ESRD. Hemoglobin below target range. Iron studies reviewed. Blood transfusion per primary team. Will start EPO wkly. Recommend starting venofer 200 mg daily for 5 days. Monitor hemoglobin.
RCRI score at least 3- confers a 15% chance of cardiac arrest, MI or death 30 days post-op  - cardiology consulted  - EKG on admission with peaked T waves and voltage criteria for LVH  - will repeat EKG today  - BP needs to be better controlled- nifedipine increased to 120mg daily
Pt at home on Novolin 8 units in AM and 8 units in PM. Recent outpatient A1c 9.9%  - A1c 9.4%  - d/w pharmacy- recommend resume 80% of home insulin dose as inpatient which would be approximately Lantus 12u qHS and ISS qac  - FS acceptable, will hold off on starting Lantus at this time, monitor FS trend
BP uncontrolled  - c/w hydralazine 100mg PO TID  - amlodipine d/valentin, started nifedipine 90mg PO qd  - added carvedilol 6.25mg PO BID  - started Lasix 40mg IV BID  - monitor BP q4h
Patient with anemia in the setting of ESRD. Hemoglobin below target range. Iron studies reviewed. Blood transfusion per primary team. started EPO wkly. on venofer 200 mg daily for 5 days. Monitor hemoglobin.
Patient with anemia in the setting of ESRD. Hemoglobin below target range. Iron studies reviewed.  started EPO wkly. on venofer 200 mg daily for 5 days. Monitor hemoglobin.
BP uncontrolled  - c/w hydralazine 100mg PO TID  - amlodipine d/valentin, started nifedipine 90mg PO qd  - added carvedilol 6.25mg PO BID  - started Lasix 40mg IV BID  - monitor BP q4h
RCRI score at least 3- confers a 15% chance of cardiac arrest, MI or death 30 days post-op  - cardiology consulted, appreciate recs  - TTE reviewed- normal EF, mild concentric LVH likely due to chronic HTN  - EKG on 8/30 reviewed- no ischemic changes noted  - patient is medically optimized for planned surgery tomorrow  - NPO after midnight

## 2022-09-02 NOTE — PROGRESS NOTE ADULT - ASSESSMENT
71 year old male with ESRD POD 1 s/p Right brachiocephalic fistula. Good thrill and no motorsensory deficit.     Plan  - Dressing removed. Follow up with Dr. Lockett as outpatient, 2 weeks after discharge.  - Call back with questions.     Daniel Peres MD, PGY 4  C Team Surgery  [id number]   71 year old male with ESRD POD 1 s/p Right radiocephalic fistula. Good thrill and no motorsensory deficit.     Plan  - Dressing removed. Follow up with Dr. Lockett as outpatient, 2 weeks after discharge.  - Call back with questions.     Daniel Peres MD, PGY 4  C Team Surgery  [id number]

## 2022-09-02 NOTE — DISCHARGE NOTE NURSING/CASE MANAGEMENT/SOCIAL WORK - NSDCPEFALRISK_GEN_ALL_CORE
For information on Fall & Injury Prevention, visit: [email or web address] OR  [email or web address] OR  https://www.cdc.gov/steadi/patient.html

## 2022-09-02 NOTE — PROGRESS NOTE ADULT - PROBLEM SELECTOR PROBLEM 4
Metabolic acidosis
Type 2 diabetes mellitus
Type 2 diabetes mellitus
Metabolic acidosis
Anemia secondary to renal failure
Type 2 diabetes mellitus
Metabolic acidosis
Metabolic acidosis
Anemia secondary to renal failure
Type 2 diabetes mellitus
Type 2 diabetes mellitus
Metabolic acidosis
Metabolic acidosis

## 2022-09-02 NOTE — DISCHARGE NOTE PROVIDER - NSDCCPCAREPLAN_GEN_ALL_CORE_FT
PRINCIPAL DISCHARGE DIAGNOSIS  Diagnosis: Stage 5 chronic kidney disease not on chronic dialysis  Assessment and Plan of Treatment:   - c/w sodium bicarb 1300mg PO BID  - c/w Phoslo 1334mg PO TID      SECONDARY DISCHARGE DIAGNOSES  Diagnosis: Hypertension  Assessment and Plan of Treatment:     Diagnosis: Type 2 diabetes mellitus  Assessment and Plan of Treatment:     Diagnosis: Hyperphosphatemia  Assessment and Plan of Treatment:      PRINCIPAL DISCHARGE DIAGNOSIS  Diagnosis: Stage 5 chronic kidney disease not on chronic dialysis  Assessment and Plan of Treatment: You underwent an AV fistula creation for preparation for hemodialysis need in the future. Currently you do not need hemodialysis but you need close follow up with Dr. Minor Nephrologist to montior your blood work and kidney function. Continue current medicatons as ordered      SECONDARY DISCHARGE DIAGNOSES  Diagnosis: Hypertension  Assessment and Plan of Treatment: low salt renal diet, continue antihypertensive medications as prescribed    Diagnosis: Type 2 diabetes mellitus  Assessment and Plan of Treatment: consistent carbohydrate diet, continue your home insulin regimen, your A1C is 9, please follow up with your PCP or Endocrinologist for furthet management , Monitor finger sticks pre-meal and bedtime,  Follow up with your ophthalmologist for routine yearly vision exams.      Diagnosis: Hyperphosphatemia  Assessment and Plan of Treatment: continue a renal diet, take your calcium acetate as prescribed    Diagnosis: Metabolic acidosis  Assessment and Plan of Treatment: continue your sodium bicarbonate tablets, follow up with Dr. Minor in 1 week     PRINCIPAL DISCHARGE DIAGNOSIS  Diagnosis: Stage 5 chronic kidney disease not on chronic dialysis  Assessment and Plan of Treatment: You underwent an AV fistula creation for preparation for hemodialysis need in the future. Currently you do not need hemodialysis but you need close follow up with Dr. Minor Nephrologist to monitor your blood work and kidney function. You will also need to follow up with Dr. Lockett in 2 weeks. Continue current medicatons as prescribed      SECONDARY DISCHARGE DIAGNOSES  Diagnosis: Type 2 diabetes mellitus  Assessment and Plan of Treatment: consistent carbohydrate diet, continue your home insulin regimen, your A1C is 9%, please follow up with your PCP or Endocrinologist for further management , Monitor sugar levels pre-meals and at bedtime,  Follow up with your ophthalmologist for routine yearly vision exams.      Diagnosis: Hypertension  Assessment and Plan of Treatment: low salt renal diet, continue antihypertensive medications as prescribed    Diagnosis: Hyperphosphatemia  Assessment and Plan of Treatment: continue a healthy renal diet, take your calcium acetate as prescribed    Diagnosis: Metabolic acidosis  Assessment and Plan of Treatment: continue your sodium bicarbonate tablets, follow up with Dr. Minor in 1 week

## 2022-09-02 NOTE — DISCHARGE NOTE PROVIDER - NSDCFUSCHEDAPPT_GEN_ALL_CORE_FT
Anthony Murphy  [facility] Physician [facility]  Endocrin [address]  Scheduled Appointment: 09/07/2022    Amrita Minor  [facility]  NEPHRO [address]  Scheduled Appointment: 09/19/2022

## 2022-09-02 NOTE — PROGRESS NOTE ADULT - PROBLEM SELECTOR PROBLEM 2
Stage 5 chronic kidney disease not on chronic dialysis
Hyperkalemia
Stage 5 chronic kidney disease not on chronic dialysis
Hyperkalemia
Stage 5 chronic kidney disease not on chronic dialysis
Hyperkalemia
Hypertensive urgency
Hyperkalemia
Hypertensive urgency

## 2022-09-02 NOTE — DISCHARGE NOTE PROVIDER - HOSPITAL COURSE
70 y/o M with hx of CKD5 not on dialysis, DM, HTN, currently on renal transplant list, sent by PMD for peaked T waves on EKG and hypertension, found to be hyperkalemic with mild metabolic acidosis, admitted for management.      Stage 5 chronic kidney disease not on chronic dialysis.   ·  Plan: Pt with progression of CKD  - nephrology following, appreciate recs  - no indication for acute HD  - vascular surgery consulted for AVF, s/p AVF creation on 9/1  - c/w sodium bicarb 1300mg PO BID  - c/w Phoslo 1334mg PO TID  - continue to monitor BMP.     Hypertensive urgency.   ·  Plan: BP better  - c/w hydralazine 100mg PO TID, carvedilol 6.25mg PO BID, nifedipine 120mg PO daily  - c/w Lasix 40mg IV daily  - monitor BP.     Preop examination.   ·  Plan: RCRI score at least 3- confers a 15% chance of cardiac arrest, MI or death 30 days post-op  - cardiology consulted, appreciate recs  - TTE reviewed- normal EF, mild concentric LVH likely due to chronic HTN  - EKG on 8/30 reviewed- no ischemic changes noted     Type 2 diabetes mellitus.   ·  Plan: Pt at home on Novolin 8 units in AM and 8 units in PM. Recent outpatient A1c 9.9%  - A1c 9.4%  - d/w pharmacy- recommend resume 80% of home insulin dose as inpatient which would be approximately Lantus 12u qHS and ISS qac  - FS acceptable, will hold off on starting Lantus at this time, monitor FS trend.  - Resumed home insulin for discharge      Anemia secondary to renal failure.   ·  Plan: Pt with known anemia from renal failure/ anemia of chronic disease, with Hg 8.3 on 8/10 but now declined to 6.8  - no signs of bleeding  - started on IV iron x5 days and Epogen  - continue to monitor  - s/p 1u PRBCs.      Patient stable and cleared for discharge home discussed with Dr. Wheatley on 9/2 . Prescriptions sent to mutually agreed upon pharmacy.

## 2022-09-02 NOTE — DISCHARGE NOTE PROVIDER - CARE PROVIDER_API CALL
Amrita Minor)  Internal Medicine; Nephrology  [address] [place] [address]  Phone: [phone]  Fax: [phone]  Follow Up Time: 1 week    Jessica Lockett)  Surgery; Vascular Surgery  [address], [address]  [place] [address]  Phone: [phone]  Fax: [phone]  Follow Up Time: 2 weeks

## 2022-09-02 NOTE — PROGRESS NOTE ADULT - SUBJECTIVE AND OBJECTIVE BOX
Patient is a 71y old  Male who presents with a chief complaint of elevated BP, abnormal EKG (26 Aug 2022 17:31)      SUBJECTIVE / OVERNIGHT EVENTS: Currently comfortable without complaint.    MEDICATIONS  (STANDING):  carvedilol 6.25 milliGRAM(s) Oral every 12 hours  dextrose 5%. 1000 milliLiter(s) (100 mL/Hr) IV Continuous <Continuous>  dextrose 5%. 1000 milliLiter(s) (50 mL/Hr) IV Continuous <Continuous>  dextrose 50% Injectable 25 Gram(s) IV Push once  dextrose 50% Injectable 12.5 Gram(s) IV Push once  dextrose 50% Injectable 25 Gram(s) IV Push once  furosemide   Injectable 40 milliGRAM(s) IV Push two times a day  glucagon  Injectable 1 milliGRAM(s) IntraMuscular once  hydrALAZINE 100 milliGRAM(s) Oral three times a day  insulin lispro (ADMELOG) corrective regimen sliding scale   SubCutaneous three times a day before meals  insulin lispro (ADMELOG) corrective regimen sliding scale   SubCutaneous at bedtime  NIFEdipine XL 90 milliGRAM(s) Oral daily  sodium bicarbonate 650 milliGRAM(s) Oral three times a day    MEDICATIONS  (PRN):  acetaminophen     Tablet .. 650 milliGRAM(s) Oral every 6 hours PRN Temp greater or equal to 38C (100.4F), Mild Pain (1 - 3)  dextrose Oral Gel 15 Gram(s) Oral once PRN Blood Glucose LESS THAN 70 milliGRAM(s)/deciliter      CAPILLARY BLOOD GLUCOSE      POCT Blood Glucose.: 186 mg/dL (27 Aug 2022 08:24)  POCT Blood Glucose.: 133 mg/dL (26 Aug 2022 22:12)  POCT Blood Glucose.: 279 mg/dL (26 Aug 2022 17:42)  POCT Blood Glucose.: 226 mg/dL (26 Aug 2022 12:35)    I&O's Summary    26 Aug 2022 07:01  -  27 Aug 2022 07:00  --------------------------------------------------------  IN: 480 mL / OUT: 480 mL / NET: 0 mL    27 Aug 2022 07:01  -  27 Aug 2022 11:07  --------------------------------------------------------  IN: 0 mL / OUT: 200 mL / NET: -200 mL        PHYSICAL EXAM:  Vital Signs Last 24 Hrs  T(C): 36.5 (27 Aug 2022 10:16), Max: 36.9 (26 Aug 2022 13:55)  T(F): 97.7 (27 Aug 2022 10:16), Max: 98.5 (27 Aug 2022 05:30)  HR: 55 (27 Aug 2022 10:16) (55 - 74)  BP: 129/48 (27 Aug 2022 10:16) (126/56 - 182/68)  BP(mean): --  RR: 18 (27 Aug 2022 10:16) (17 - 18)  SpO2: 99% (27 Aug 2022 10:16) (98% - 100%)    Parameters below as of 27 Aug 2022 10:16  Patient On (Oxygen Delivery Method): room air      CONSTITUTIONAL: NAD, well-developed, well-groomed  EYES: PERRLA; conjunctiva and sclera clear  ENMT: Moist oral mucosa, no pharyngeal injection or exudates; normal dentition  NECK: Supple, no palpable masses; no thyromegaly  RESPIRATORY: Normal respiratory effort; lungs are clear to auscultation bilaterally  CARDIOVASCULAR: Regular rate and rhythm, normal S1 and S2, no murmur/rub/gallop; No lower extremity edema; Peripheral pulses are 2+ bilaterally  ABDOMEN: Nontender to palpation, normoactive bowel sounds, no rebound/guarding; No hepatosplenomegaly  MUSCULOSKELETAL:  Normal gait; no clubbing or cyanosis of digits; no joint swelling or tenderness to palpation  PSYCH: A+O to person, place, and time; affect appropriate  NEUROLOGY: CN 2-12 are intact and symmetric; no gross sensory deficits   SKIN: No rashes; no palpable lesions    LABS:                        6.9    6.88  )-----------( 200      ( 27 Aug 2022 08:00 )             22.7     08-27    138  |  106  |  58<H>  ----------------------------<  180<H>  5.0   |  17<L>  |  5.86<H>    Ca    8.6      27 Aug 2022 08:00  Phos  5.6     08-27  Mg     2.20     08-27    TPro  7.2  /  Alb  3.8  /  TBili  0.4  /  DBili  x   /  AST  16  /  ALT  10  /  AlkPhos  87  08-26                RADIOLOGY & ADDITIONAL TESTS:  Results Reviewed:   Imaging Personally Reviewed:  Electrocardiogram Personally Reviewed:    COORDINATION OF CARE:  Care Discussed with Consultants/Other Providers [Y/N]:  Prior or Outpatient Records Reviewed [Y/N]:  
[facility] Division of Kidney Diseases & Hypertension  FOLLOW UP NOTE  [phone]--------------------------------------------------------------------------------  HPI: 71 -year-old Male with hx of CKD5, DM, HTN, sent to [facility] on 8/25 by PMD (Dr. Prachi Omer - [phone]) for "abnormal EKG" and elevated blood pressure. Initial vitals niER, concerning severe HTN with BP of 209/74mmhg. Initial labs in ER, showed hyperkalemia of 5.8. Pt. received IV lasix 40 , insulin with D50, lokelma. Repeat Serum potassium was WNL at 4.3. Nephrology team consulted for CKD and hyperkalemia. Pt. is CKD stage 5 likely secondary to long standing DM and HTN. Pt. initially used to follow Dr. Mcwilliams and now follows with Dr. Amrita Minor (nephrologist). last visit on 8/1/22. Scr was elevated at 4.8 on 8/1/22. Pt. was made aware that he will need dialysis in near future. Scr was elevated at 5.15 on admission on 8/25/22.     24 hour events/subjective:  Pt. seen and examined at bedside. Pt. reports feeling well today, has good appetite. Denies SOB, CP, HA, N/V, abdominal pain, loss of appetite, urinary symptoms or dizziness.       PAST HISTORY  --------------------------------------------------------------------------------  No significant changes to PMH, PSH, FHx, SHx, unless otherwise noted    ALLERGIES & MEDICATIONS  --------------------------------------------------------------------------------  Allergies    No Known Allergies    Intolerances      Standing Inpatient Medications  carvedilol 6.25 milliGRAM(s) Oral every 12 hours  dextrose 5%. 1000 milliLiter(s) IV Continuous <Continuous>  dextrose 5%. 1000 milliLiter(s) IV Continuous <Continuous>  dextrose 50% Injectable 25 Gram(s) IV Push once  dextrose 50% Injectable 12.5 Gram(s) IV Push once  dextrose 50% Injectable 25 Gram(s) IV Push once  glucagon  Injectable 1 milliGRAM(s) IntraMuscular once  hydrALAZINE 100 milliGRAM(s) Oral three times a day  insulin lispro (ADMELOG) corrective regimen sliding scale   SubCutaneous three times a day before meals  insulin lispro (ADMELOG) corrective regimen sliding scale   SubCutaneous at bedtime  NIFEdipine XL 90 milliGRAM(s) Oral daily  sodium bicarbonate 1300 milliGRAM(s) Oral two times a day    PRN Inpatient Medications  acetaminophen     Tablet .. 650 milliGRAM(s) Oral every 6 hours PRN  dextrose Oral Gel 15 Gram(s) Oral once PRN      REVIEW OF SYSTEMS  --------------------------------------------------------------------------------  Gen: No fevers   Respiratory: No dyspnea  CV: No chest pain  GI: No abdominal pain  : No dysuria  MSK: No  edema  Neuro: no dizziness  All other systems were reviewed and are negative, except as noted.    VITALS/PHYSICAL EXAM  --------------------------------------------------------------------------------  T(C): 36.9 (08-29-22 @ 13:30), Max: 36.9 (08-29-22 @ 01:30)  HR: 57 (08-29-22 @ 13:30) (56 - 60)  BP: 139/54 (08-29-22 @ 13:30) (139/54 - 164/76)  RR: 18 (08-29-22 @ 13:30) (16 - 18)  SpO2: 100% (08-29-22 @ 13:30) (98% - 100%)  Wt(kg): --        08-28-22 @ 07:01  -  08-29-22 @ 07:00  --------------------------------------------------------  IN: 1300 mL / OUT: 2600 mL / NET: -1300 mL    08-29-22 @ 07:01  -  08-29-22 @ 14:23  --------------------------------------------------------  IN: 120 mL / OUT: 500 mL / NET: -380 mL      Physical Exam:  	Gen: NAD  	HEENT: Anicteric, no edema  	Pulm: CTA B/L  	CV: S1S2+, 1/6 systolic murmur appreciated at LLSB  	Abd: Soft, +BS, NTND  	Ext: No LE edema B/L  	Neuro: Awake, alert, speech clear and fluent without facial asymmetry  	Skin: Warm and dry  	Dialysis access: None    LABS/STUDIES  --------------------------------------------------------------------------------              8.6    6.25  >-----------<  200      [08-29-22 @ 01:30]              27.2     136  |  101  |  77  ----------------------------<  143      [08-29-22 @ 01:30]  4.3   |  19  |  6.12        Ca     8.2     [08-29-22 @ 01:30]      Mg     2.20     [08-29-22 @ 01:30]      Phos  6.3     [08-29-22 @ 01:30]            Creatinine Trend:  SCr 6.12 [08-29 @ 01:30]  SCr 5.95 [08-28 @ 17:31]  SCr 5.80 [08-28 @ 07:41]  SCr 5.86 [08-27 @ 08:00]  SCr 4.99 [08-26 @ 06:34]        Iron 41, TIBC 206, %sat 20      [08-26-22 @ 06:34]  Ferritin 257      [08-26-22 @ 06:34]  PTH -- (Ca --)      [08-26-22 @ 06:34]   86  Vitamin D (25OH) 16.1      [08-26-22 @ 06:34]    HCV 0.11, Nonreact      [08-26-22 @ 06:34]    
Vascular Progress Note    S: Patient seen and examined. No acute events overnight. Reports No motorsensory deficit in the right hand. Reports minimal pain in the hand.     O:  Physical Exam:  Gen: Laying in bed, NAD  HEENT: atrumatic, EMOI  Resp: Unlabored breathing  Abd: soft, NT,ND, no rebound or guarding.   Vascular: Right wrist incision c/d/i, no erythema no hematoma. Radial pulse palpable. thrill over fistula.        Vital Signs Last 24 Hrs  T(C): 36.8 (02 Sep 2022 05:50), Max: 36.8 (02 Sep 2022 05:50)  T(F): 98.3 (02 Sep 2022 05:50), Max: 98.3 (02 Sep 2022 05:50)  HR: 68 (02 Sep 2022 05:50) (48 - 84)  BP: 164/70 (02 Sep 2022 05:50) (160/64 - 183/75)  BP(mean): 88 (01 Sep 2022 19:15) (88 - 102)  RR: 18 (02 Sep 2022 05:50) (10 - 18)  SpO2: 99% (02 Sep 2022 05:50) (95% - 99%)    Parameters below as of 02 Sep 2022 05:50  Patient On (Oxygen Delivery Method): room air        I&O's Detail    01 Sep 2022 07:01  -  02 Sep 2022 07:00  --------------------------------------------------------  IN:    Oral Fluid: 540 mL  Total IN: 540 mL    OUT:    Voided (mL): 1150 mL  Total OUT: 1150 mL    Total NET: -610 mL                                9.2    7.98  )-----------( 218      ( 01 Sep 2022 04:00 )             28.5       09-01    134<L>  |  98  |  90<H>  ----------------------------<  138<H>  4.1   |  21<L>  |  6.30<H>    Ca    8.8      01 Sep 2022 04:00  Phos  4.9     09-01  Mg     2.30     09-01    TPro  6.1  /  Alb  3.5  /  TBili  0.3  /  DBili  x   /  AST  9   /  ALT  10  /  AlkPhos  71  09-01      
Laurie Lee  [facility] Medicine  Pager #[phone]  Available on Microsoft Teams    Patient is a 71y old  Male who presents with a chief complaint of elevated BP, abnormal EKG (29 Aug 2022 14:22)      SUBJECTIVE / OVERNIGHT EVENTS: Patient seen and examined at bedside. Patient feels well, no complaints.    ADDITIONAL REVIEW OF SYSTEMS:    MEDICATIONS  (STANDING):  calcium acetate 1334 milliGRAM(s) Oral three times a day with meals  carvedilol 6.25 milliGRAM(s) Oral every 12 hours  dextrose 5%. 1000 milliLiter(s) (100 mL/Hr) IV Continuous <Continuous>  dextrose 5%. 1000 milliLiter(s) (50 mL/Hr) IV Continuous <Continuous>  dextrose 50% Injectable 25 Gram(s) IV Push once  dextrose 50% Injectable 12.5 Gram(s) IV Push once  dextrose 50% Injectable 25 Gram(s) IV Push once  epoetin reilly-epbx (RETACRIT) Injectable 2000 Unit(s) IV Push every 7 days  glucagon  Injectable 1 milliGRAM(s) IntraMuscular once  hydrALAZINE 100 milliGRAM(s) Oral three times a day  insulin lispro (ADMELOG) corrective regimen sliding scale   SubCutaneous three times a day before meals  insulin lispro (ADMELOG) corrective regimen sliding scale   SubCutaneous at bedtime  iron sucrose IVPB 200 milliGRAM(s) IV Intermittent every 24 hours  NIFEdipine XL 90 milliGRAM(s) Oral daily  sodium bicarbonate 1300 milliGRAM(s) Oral two times a day    MEDICATIONS  (PRN):  acetaminophen     Tablet .. 650 milliGRAM(s) Oral every 6 hours PRN Temp greater or equal to 38C (100.4F), Mild Pain (1 - 3)  dextrose Oral Gel 15 Gram(s) Oral once PRN Blood Glucose LESS THAN 70 milliGRAM(s)/deciliter      CAPILLARY BLOOD GLUCOSE      POCT Blood Glucose.: 282 mg/dL (29 Aug 2022 12:41)  POCT Blood Glucose.: 174 mg/dL (29 Aug 2022 08:35)  POCT Blood Glucose.: 189 mg/dL (28 Aug 2022 22:13)  POCT Blood Glucose.: 165 mg/dL (28 Aug 2022 17:36)    I&O's Summary    28 Aug 2022 07:01  -  29 Aug 2022 07:00  --------------------------------------------------------  IN: 1300 mL / OUT: 2600 mL / NET: -1300 mL    29 Aug 2022 07:01  -  29 Aug 2022 17:02  --------------------------------------------------------  IN: 120 mL / OUT: 500 mL / NET: -380 mL        PHYSICAL EXAM:    Vital Signs Last 24 Hrs  T(C): 36.9 (29 Aug 2022 13:30), Max: 36.9 (29 Aug 2022 01:30)  T(F): 98.4 (29 Aug 2022 13:30), Max: 98.5 (29 Aug 2022 05:30)  HR: 61 (29 Aug 2022 16:10) (56 - 61)  BP: 147/65 (29 Aug 2022 16:10) (139/54 - 164/76)  BP(mean): --  RR: 18 (29 Aug 2022 13:30) (16 - 18)  SpO2: 100% (29 Aug 2022 13:30) (98% - 100%)    Parameters below as of 29 Aug 2022 13:30  Patient On (Oxygen Delivery Method): room air      CONSTITUTIONAL: NAD, well-developed, well-groomed  EYES: Conjunctiva and sclera clear  ENMT: Moist oral mucosa  RESPIRATORY: Normal respiratory effort; lungs are clear to auscultation bilaterally  CARDIOVASCULAR: Regular rate and rhythm, normal S1 and S2, no murmur/rub/gallop; No lower extremity edema  ABDOMEN: Soft, nontender to palpation, normoactive bowel sounds  PSYCH: A+O to person, place, and time; affect appropriate  NEUROLOGY: No focal deficits  SKIN: No rashes; no palpable lesions    LABS:                        8.6    6.25  )-----------( 200      ( 29 Aug 2022 01:30 )             27.2     08-29    136  |  101  |  77<H>  ----------------------------<  143<H>  4.3   |  19<L>  |  6.12<H>    Ca    8.2<L>      29 Aug 2022 01:30  Phos  6.3     08-29  Mg     2.20     08-29      RADIOLOGY & ADDITIONAL TESTS: No new imaging  Results Reviewed: Yes  Imaging Personally Reviewed:  Electrocardiogram Personally Reviewed:    COORDINATION OF CARE:  Care Discussed with Consultants/Other Providers [Y/N]:  Prior or Outpatient Records Reviewed [Y/N]:  
[facility] Division of Kidney Diseases & Hypertension  FOLLOW UP NOTE  [phone]--------------------------------------------------------------------------------  HPI: 71 -year-old Male with hx of CKD5, DM, HTN, sent to [facility] on 8/25 by PMD (Dr. Prachi Omer - [phone]) for "abnormal EKG" and elevated blood pressure. Initial vitals niER, concerning severe HTN with BP of 209/74mmhg. Initial labs in ER, showed hyperkalemia of 5.8. Pt. received IV lasix 40 , insulin with D50, lokelma. Repeat Serum potassium was WNL at 4.3. Nephrology team consulted for CKD and hyperkalemia. Pt. is CKD stage 5 likely secondary to long standing DM and HTN. Pt. initially used to follow Dr. Mcwilliams and now follows with Dr. Amrita Minor (nephrologist). last visit on 8/1/22. Scr was elevated at 4.8 on 8/1/22. Pt. was made aware that he will need dialysis in near future. Scr was elevated at 5.15 on admission on 8/25/22.     24 hour events/subjective:  Pt. seen and examined at bedside. Pt. reports feeling well today, has good appetite. Denies SOB, CP, HA, N/V, abdominal pain, loss of appetite, urinary symptoms or dizziness.     PAST HISTORY  --------------------------------------------------------------------------------  No significant changes to PMH, PSH, FHx, SHx, unless otherwise noted    ALLERGIES & MEDICATIONS  --------------------------------------------------------------------------------  Allergies    No Known Allergies    Intolerances      Standing Inpatient Medications  calcium acetate 1334 milliGRAM(s) Oral three times a day with meals  carvedilol 6.25 milliGRAM(s) Oral every 12 hours  dextrose 5%. 1000 milliLiter(s) IV Continuous <Continuous>  dextrose 5%. 1000 milliLiter(s) IV Continuous <Continuous>  dextrose 50% Injectable 25 Gram(s) IV Push once  dextrose 50% Injectable 12.5 Gram(s) IV Push once  dextrose 50% Injectable 25 Gram(s) IV Push once  epoetin reilly-epbx (RETACRIT) Injectable 2000 Unit(s) IV Push every 7 days  furosemide   Injectable 40 milliGRAM(s) IV Push daily  glucagon  Injectable 1 milliGRAM(s) IntraMuscular once  hydrALAZINE 100 milliGRAM(s) Oral three times a day  insulin lispro (ADMELOG) corrective regimen sliding scale   SubCutaneous three times a day before meals  insulin lispro (ADMELOG) corrective regimen sliding scale   SubCutaneous at bedtime  iron sucrose IVPB 200 milliGRAM(s) IV Intermittent every 24 hours  NIFEdipine XL 90 milliGRAM(s) Oral daily  sodium bicarbonate 1300 milliGRAM(s) Oral two times a day    PRN Inpatient Medications  acetaminophen     Tablet .. 650 milliGRAM(s) Oral every 6 hours PRN  dextrose Oral Gel 15 Gram(s) Oral once PRN      REVIEW OF SYSTEMS  --------------------------------------------------------------------------------  Gen: No  fevers/chills  Head/Eyes/Ears/Mouth: No headache  Respiratory: No dyspnea  CV: No chest pain  GI: No abdominal pain, diarrhea, constipation, nausea, vomiting  : No increased frequency, dysuria, hematuria  MSK: No edema  Neuro: No dizziness/lightheadedness      All other systems were reviewed and are negative, except as noted.    VITALS/PHYSICAL EXAM  --------------------------------------------------------------------------------  T(C): 36.6 (08-30-22 @ 05:00), Max: 36.7 (08-29-22 @ 21:30)  HR: 61 (08-30-22 @ 05:00) (56 - 61)  BP: 166/62 (08-30-22 @ 05:00) (147/65 - 166/62)  RR: 18 (08-30-22 @ 05:00) (18 - 18)  SpO2: 98% (08-30-22 @ 05:00) (97% - 98%)  Wt(kg): --        08-29-22 @ 07:01  -  08-30-22 @ 07:00  --------------------------------------------------------  IN: 960 mL / OUT: 1650 mL / NET: -690 mL      Physical Exam:  	Gen: NAD  	HEENT: Anicteric, no edema  	Pulm: CTA B/L  	CV: S1S2+, 1/6 systolic murmur appreciated at LLSB  	Abd: Soft, +BS, NTND  	Ext: No LE edema B/L  	Neuro: Awake, alert, speech clear and fluent without facial asymmetry  	Skin: Warm and dry  	Dialysis access: None      LABS/STUDIES  --------------------------------------------------------------------------------              9.2    5.58  >-----------<  202      [08-30-22 @ 08:06]              28.7     137  |  100  |  84  ----------------------------<  157      [08-30-22 @ 08:06]  4.2   |  22  |  6.30        Ca     8.6     [08-30-22 @ 08:06]      Mg     2.30     [08-30-22 @ 08:06]      Phos  6.3     [08-30-22 @ 08:06]            Creatinine Trend:  SCr 6.30 [08-30 @ 08:06]  SCr 6.12 [08-29 @ 01:30]  SCr 5.95 [08-28 @ 17:31]  SCr 5.80 [08-28 @ 07:41]  SCr 5.86 [08-27 @ 08:00]        Iron 41, TIBC 206, %sat 20      [08-26-22 @ 06:34]  Ferritin 257      [08-26-22 @ 06:34]  PTH -- (Ca --)      [08-26-22 @ 06:34]   86  Vitamin D (25OH) 16.1      [08-26-22 @ 06:34]    HCV 0.11, Nonreact      [08-26-22 @ 06:34]    
Laurie Lee  [facility] Medicine  Pager #[phone]  Available on [employer] Teams    Patient is a 71y old  Male who presents with a chief complaint of Hyperkalemia     (30 Aug 2022 14:40)      SUBJECTIVE / OVERNIGHT EVENTS: Patient seen and examined at bedside. Patient feels well, no acute complaints.    ADDITIONAL REVIEW OF SYSTEMS:    MEDICATIONS  (STANDING):  calcium acetate 1334 milliGRAM(s) Oral three times a day with meals  carvedilol 6.25 milliGRAM(s) Oral every 12 hours  dextrose 5%. 1000 milliLiter(s) (100 mL/Hr) IV Continuous <Continuous>  dextrose 5%. 1000 milliLiter(s) (50 mL/Hr) IV Continuous <Continuous>  dextrose 50% Injectable 25 Gram(s) IV Push once  dextrose 50% Injectable 12.5 Gram(s) IV Push once  dextrose 50% Injectable 25 Gram(s) IV Push once  epoetin reilly-epbx (RETACRIT) Injectable 2000 Unit(s) IV Push every 7 days  furosemide   Injectable 40 milliGRAM(s) IV Push daily  glucagon  Injectable 1 milliGRAM(s) IntraMuscular once  hydrALAZINE 100 milliGRAM(s) Oral three times a day  insulin lispro (ADMELOG) corrective regimen sliding scale   SubCutaneous three times a day before meals  insulin lispro (ADMELOG) corrective regimen sliding scale   SubCutaneous at bedtime  iron sucrose IVPB 200 milliGRAM(s) IV Intermittent every 24 hours  sodium bicarbonate 1300 milliGRAM(s) Oral two times a day    MEDICATIONS  (PRN):  acetaminophen     Tablet .. 650 milliGRAM(s) Oral every 6 hours PRN Temp greater or equal to 38C (100.4F), Mild Pain (1 - 3)  dextrose Oral Gel 15 Gram(s) Oral once PRN Blood Glucose LESS THAN 70 milliGRAM(s)/deciliter      CAPILLARY BLOOD GLUCOSE      POCT Blood Glucose.: 232 mg/dL (30 Aug 2022 12:33)  POCT Blood Glucose.: 158 mg/dL (30 Aug 2022 08:24)  POCT Blood Glucose.: 151 mg/dL (29 Aug 2022 21:31)  POCT Blood Glucose.: 132 mg/dL (29 Aug 2022 17:51)    I&O's Summary    29 Aug 2022 07:01  -  30 Aug 2022 07:00  --------------------------------------------------------  IN: 960 mL / OUT: 1650 mL / NET: -690 mL        PHYSICAL EXAM:    Vital Signs Last 24 Hrs  T(C): 36.7 (30 Aug 2022 12:55), Max: 36.7 (29 Aug 2022 21:30)  T(F): 98 (30 Aug 2022 12:55), Max: 98.1 (29 Aug 2022 21:30)  HR: 60 (30 Aug 2022 12:55) (56 - 61)  BP: 168/65 (30 Aug 2022 12:55) (147/65 - 168/65)  BP(mean): --  RR: 17 (30 Aug 2022 12:55) (17 - 18)  SpO2: 99% (30 Aug 2022 12:55) (97% - 99%)    Parameters below as of 30 Aug 2022 12:55  Patient On (Oxygen Delivery Method): room air    CONSTITUTIONAL: NAD, well-developed, well-groomed  EYES: Conjunctiva and sclera clear  ENMT: Moist oral mucosa  RESPIRATORY: Normal respiratory effort; lungs are clear to auscultation bilaterally  CARDIOVASCULAR: Regular rate and rhythm, normal S1 and S2, no murmur/rub/gallop; No lower extremity edema  ABDOMEN: Soft, nontender to palpation, normoactive bowel sounds  PSYCH: A+O to person, place, and time; affect appropriate  NEUROLOGY: No focal deficits  SKIN: No rashes; no palpable lesions    LABS:                        9.2    5.58  )-----------( 202      ( 30 Aug 2022 08:06 )             28.7     08-30    137  |  100  |  84<H>  ----------------------------<  157<H>  4.2   |  22  |  6.30<H>    Ca    8.6      30 Aug 2022 08:06  Phos  6.3     08-30  Mg     2.30     08-30      RADIOLOGY & ADDITIONAL TESTS: No new imaging  Results Reviewed: Yes  Imaging Personally Reviewed:  Electrocardiogram Personally Reviewed:    COORDINATION OF CARE:  Care Discussed with Consultants/Other Providers [Y/N]:  Prior or Outpatient Records Reviewed [Y/N]:  
VASCULAR SURGERY DAILY PROGRESS NOTE:    Interval/Subjective  No acute events overnight. Patient seen and examined.     Vital Signs Last 24 Hrs  T(C): 36.7 (31 Aug 2022 05:40), Max: 36.9 (30 Aug 2022 22:10)  T(F): 98 (31 Aug 2022 05:40), Max: 98.4 (30 Aug 2022 22:10)  HR: 72 (31 Aug 2022 05:40) (60 - 72)  BP: 155/76 (31 Aug 2022 05:40) (155/76 - 168/66)  BP(mean): --  RR: 17 (31 Aug 2022 05:40) (17 - 17)  SpO2: 99% (31 Aug 2022 05:40) (99% - 100%)    Parameters below as of 31 Aug 2022 05:40  Patient On (Oxygen Delivery Method): room air        Exam:  Exam:  Gen: NAD, resting in bed, alert and responding appropriately  Resp: Airway patent, non-labored respirations  Extremities: palpable radial and ulnar artery pulse bilaterally. No evidence of surgical scars. Superficial veins not identified.    I&O's Detail    30 Aug 2022 07:01  -  31 Aug 2022 07:00  --------------------------------------------------------  IN:    Oral Fluid: 1000 mL  Total IN: 1000 mL    OUT:    Voided (mL): 1800 mL  Total OUT: 1800 mL    Total NET: -800 mL          Daily     Daily     MEDICATIONS  (STANDING):  calcium acetate 1334 milliGRAM(s) Oral three times a day with meals  carvedilol 6.25 milliGRAM(s) Oral every 12 hours  dextrose 5%. 1000 milliLiter(s) (100 mL/Hr) IV Continuous <Continuous>  dextrose 5%. 1000 milliLiter(s) (50 mL/Hr) IV Continuous <Continuous>  dextrose 50% Injectable 25 Gram(s) IV Push once  dextrose 50% Injectable 12.5 Gram(s) IV Push once  dextrose 50% Injectable 25 Gram(s) IV Push once  epoetin reilly-epbx (RETACRIT) Injectable 2000 Unit(s) IV Push every 7 days  furosemide   Injectable 40 milliGRAM(s) IV Push daily  glucagon  Injectable 1 milliGRAM(s) IntraMuscular once  hydrALAZINE 100 milliGRAM(s) Oral three times a day  insulin lispro (ADMELOG) corrective regimen sliding scale   SubCutaneous three times a day before meals  insulin lispro (ADMELOG) corrective regimen sliding scale   SubCutaneous at bedtime  iron sucrose IVPB 200 milliGRAM(s) IV Intermittent every 24 hours  lactated ringers. 1000 milliLiter(s) (30 mL/Hr) IV Continuous <Continuous>  NIFEdipine  milliGRAM(s) Oral daily  sodium bicarbonate 1300 milliGRAM(s) Oral two times a day    MEDICATIONS  (PRN):  acetaminophen     Tablet .. 650 milliGRAM(s) Oral every 6 hours PRN Temp greater or equal to 38C (100.4F), Mild Pain (1 - 3)  dextrose Oral Gel 15 Gram(s) Oral once PRN Blood Glucose LESS THAN 70 milliGRAM(s)/deciliter      LABS:                        9.0    5.94  )-----------( 201      ( 31 Aug 2022 07:01 )             27.6     08-31    138  |  100  |  91<H>  ----------------------------<  167<H>  4.1   |  22  |  6.48<H>    Ca    8.4      31 Aug 2022 07:01  Phos  5.4     08-31  Mg     2.20     08-31              
VASCULAR SURGERY DAILY PROGRESS NOTE:    Interval/Subjective  No acute events overnight. Patient seen and examined.    Vital Signs Last 24 Hrs  T(C): 36.6 (30 Aug 2022 05:00), Max: 36.9 (29 Aug 2022 13:30)  T(F): 97.8 (30 Aug 2022 05:00), Max: 98.4 (29 Aug 2022 13:30)  HR: 61 (30 Aug 2022 05:00) (56 - 61)  BP: 166/62 (30 Aug 2022 05:00) (139/54 - 166/62)  BP(mean): --  RR: 18 (30 Aug 2022 05:00) (18 - 18)  SpO2: 98% (30 Aug 2022 05:00) (97% - 100%)    Parameters below as of 30 Aug 2022 05:00  Patient On (Oxygen Delivery Method): room air        Exam:  Gen: NAD, resting in bed, alert and responding appropriately  Resp: Airway patent, non-labored respirations  Extremities: palpable radial and ulnar artery pulse bilaterally. No evidence of surgical scars. Superficial veins not identified.    29 Aug 2022 07:01  -  30 Aug 2022 07:00  --------------------------------------------------------  IN:    Oral Fluid: 960 mL  Total IN: 960 mL    OUT:    Voided (mL): 1650 mL  Total OUT: 1650 mL    Total NET: -690 mL          Daily     Daily     MEDICATIONS  (STANDING):  calcium acetate 1334 milliGRAM(s) Oral three times a day with meals  carvedilol 6.25 milliGRAM(s) Oral every 12 hours  dextrose 5%. 1000 milliLiter(s) (100 mL/Hr) IV Continuous <Continuous>  dextrose 5%. 1000 milliLiter(s) (50 mL/Hr) IV Continuous <Continuous>  dextrose 50% Injectable 25 Gram(s) IV Push once  dextrose 50% Injectable 12.5 Gram(s) IV Push once  dextrose 50% Injectable 25 Gram(s) IV Push once  epoetin reilly-epbx (RETACRIT) Injectable 2000 Unit(s) IV Push every 7 days  furosemide   Injectable 40 milliGRAM(s) IV Push daily  glucagon  Injectable 1 milliGRAM(s) IntraMuscular once  hydrALAZINE 100 milliGRAM(s) Oral three times a day  insulin lispro (ADMELOG) corrective regimen sliding scale   SubCutaneous three times a day before meals  insulin lispro (ADMELOG) corrective regimen sliding scale   SubCutaneous at bedtime  iron sucrose IVPB 200 milliGRAM(s) IV Intermittent every 24 hours  NIFEdipine XL 90 milliGRAM(s) Oral daily  sodium bicarbonate 1300 milliGRAM(s) Oral two times a day    MEDICATIONS  (PRN):  acetaminophen     Tablet .. 650 milliGRAM(s) Oral every 6 hours PRN Temp greater or equal to 38C (100.4F), Mild Pain (1 - 3)  dextrose Oral Gel 15 Gram(s) Oral once PRN Blood Glucose LESS THAN 70 milliGRAM(s)/deciliter      LABS:                        9.2    5.58  )-----------( 202      ( 30 Aug 2022 08:06 )             28.7     08-30    137  |  100  |  84<H>  ----------------------------<  157<H>  4.2   |  22  |  6.30<H>    Ca    8.6      30 Aug 2022 08:06  Phos  6.3     08-30  Mg     2.30     08-30                
Laurie Wheatley  [facility] Medicine  Pager #[phone]  Available on [employer] Teams    Patient is a 71y old  Male who presents with a chief complaint of elevated BP, abnormal EKG (31 Aug 2022 13:31)      SUBJECTIVE / OVERNIGHT EVENTS: Patient seen and examined at bedside. Patient feels well, no acute complaints. Aware of plan for surgery tomorrow.    ADDITIONAL REVIEW OF SYSTEMS:    MEDICATIONS  (STANDING):  calcium acetate 1334 milliGRAM(s) Oral three times a day with meals  carvedilol 6.25 milliGRAM(s) Oral every 12 hours  dextrose 5%. 1000 milliLiter(s) (100 mL/Hr) IV Continuous <Continuous>  dextrose 5%. 1000 milliLiter(s) (50 mL/Hr) IV Continuous <Continuous>  dextrose 50% Injectable 25 Gram(s) IV Push once  dextrose 50% Injectable 12.5 Gram(s) IV Push once  dextrose 50% Injectable 25 Gram(s) IV Push once  epoetin reilly-epbx (RETACRIT) Injectable 2000 Unit(s) IV Push every 7 days  furosemide   Injectable 40 milliGRAM(s) IV Push daily  glucagon  Injectable 1 milliGRAM(s) IntraMuscular once  hydrALAZINE 100 milliGRAM(s) Oral three times a day  insulin lispro (ADMELOG) corrective regimen sliding scale   SubCutaneous three times a day before meals  insulin lispro (ADMELOG) corrective regimen sliding scale   SubCutaneous at bedtime  iron sucrose IVPB 200 milliGRAM(s) IV Intermittent every 24 hours  lactated ringers. 1000 milliLiter(s) (30 mL/Hr) IV Continuous <Continuous>  NIFEdipine  milliGRAM(s) Oral daily  sodium bicarbonate 1300 milliGRAM(s) Oral two times a day    MEDICATIONS  (PRN):  acetaminophen     Tablet .. 650 milliGRAM(s) Oral every 6 hours PRN Temp greater or equal to 38C (100.4F), Mild Pain (1 - 3)  dextrose Oral Gel 15 Gram(s) Oral once PRN Blood Glucose LESS THAN 70 milliGRAM(s)/deciliter      CAPILLARY BLOOD GLUCOSE      POCT Blood Glucose.: 223 mg/dL (31 Aug 2022 12:21)  POCT Blood Glucose.: 181 mg/dL (31 Aug 2022 08:32)  POCT Blood Glucose.: 244 mg/dL (30 Aug 2022 21:28)  POCT Blood Glucose.: 144 mg/dL (30 Aug 2022 17:28)    I&O's Summary    30 Aug 2022 07:01  -  31 Aug 2022 07:00  --------------------------------------------------------  IN: 1000 mL / OUT: 1800 mL / NET: -800 mL    31 Aug 2022 07:01  -  31 Aug 2022 15:41  --------------------------------------------------------  IN: 240 mL / OUT: 400 mL / NET: -160 mL        PHYSICAL EXAM:    Vital Signs Last 24 Hrs  T(C): 36.8 (31 Aug 2022 12:30), Max: 36.9 (30 Aug 2022 22:10)  T(F): 98.2 (31 Aug 2022 12:30), Max: 98.4 (30 Aug 2022 22:10)  HR: 51 (31 Aug 2022 12:30) (51 - 72)  BP: 155/72 (31 Aug 2022 12:30) (155/72 - 168/66)  BP(mean): --  RR: 18 (31 Aug 2022 12:30) (17 - 18)  SpO2: 99% (31 Aug 2022 12:30) (99% - 100%)    Parameters below as of 31 Aug 2022 12:30  Patient On (Oxygen Delivery Method): room air    CONSTITUTIONAL: NAD, well-developed, well-groomed  EYES: Conjunctiva and sclera clear  ENMT: Moist oral mucosa  RESPIRATORY: Normal respiratory effort; lungs are clear to auscultation bilaterally  CARDIOVASCULAR: Regular rate and rhythm, normal S1 and S2, no murmur/rub/gallop; No lower extremity edema  ABDOMEN: Soft, nontender to palpation, normoactive bowel sounds  PSYCH: A+O to person, place, and time; affect appropriate  NEUROLOGY: No focal deficits  SKIN: No rashes; no palpable lesions    LABS:                        9.0    5.94  )-----------( 201      ( 31 Aug 2022 07:01 )             27.6     08-31    138  |  100  |  91<H>  ----------------------------<  167<H>  4.1   |  22  |  6.48<H>    Ca    8.4      31 Aug 2022 07:01  Phos  5.4     08-31  Mg     2.20     08-31      RADIOLOGY & ADDITIONAL TESTS: No new imaging  Results Reviewed: Yes  Imaging Personally Reviewed:  Electrocardiogram Personally Reviewed:    COORDINATION OF CARE:  Care Discussed with Consultants/Other Providers [Y/N]:  Prior or Outpatient Records Reviewed [Y/N]:  
[facility] DIVISION OF KIDNEY DISEASES AND HYPERTENSION -- FOLLOW UP NOTE  HOLLY Fellow pager # [phone]  Nephrology office # [phone]  Available on [employer] teams--> Robin Javier  -----------------------------------------------------------------------------  Chief Complaint:/subjective:  no complaints awaiting fistula placement       ALLERGIES & MEDICATIONS  --------------------------------------------------------------------------------  Allergies    No Known Allergies    Intolerances      Standing Inpatient Medications  calcium acetate 1334 milliGRAM(s) Oral three times a day with meals  carvedilol 6.25 milliGRAM(s) Oral every 12 hours  dextrose 5%. 1000 milliLiter(s) IV Continuous <Continuous>  dextrose 5%. 1000 milliLiter(s) IV Continuous <Continuous>  dextrose 50% Injectable 25 Gram(s) IV Push once  dextrose 50% Injectable 12.5 Gram(s) IV Push once  dextrose 50% Injectable 25 Gram(s) IV Push once  epoetin reilly-epbx (RETACRIT) Injectable 2000 Unit(s) IV Push every 7 days  furosemide   Injectable 40 milliGRAM(s) IV Push daily  glucagon  Injectable 1 milliGRAM(s) IntraMuscular once  hydrALAZINE 100 milliGRAM(s) Oral three times a day  insulin lispro (ADMELOG) corrective regimen sliding scale   SubCutaneous three times a day before meals  insulin lispro (ADMELOG) corrective regimen sliding scale   SubCutaneous at bedtime  iron sucrose IVPB 200 milliGRAM(s) IV Intermittent every 24 hours  NIFEdipine  milliGRAM(s) Oral daily  sodium bicarbonate 1300 milliGRAM(s) Oral two times a day    PRN Inpatient Medications  acetaminophen     Tablet .. 650 milliGRAM(s) Oral every 6 hours PRN  dextrose Oral Gel 15 Gram(s) Oral once PRN  HYDROmorphone  Injectable 0.5 milliGRAM(s) IV Push every 10 minutes PRN      REVIEW OF SYSTEMS  --------------------------------------------------------------------------------    Gen: No  fevers/chills  Skin: No rashes  Respiratory: no SOB  CV: No chest pain,   GI: No abdominal pain  :  -oliguria   MSK: No joint pain/   -swelling;     All other systems were reviewed and are negative, except as noted.      VITALS/PHYSICAL EXAM  --------------------------------------------------------------------------------  T(C): 36.1 (09-01-22 @ 11:22), Max: 36.7 (08-31-22 @ 22:20)  HR: 58 (09-01-22 @ 11:22) (55 - 74)  BP: 175/69 (09-01-22 @ 11:22) (152/70 - 175/69)  RR: 16 (09-01-22 @ 11:22) (16 - 18)  SpO2: 97% (09-01-22 @ 11:22) (97% - 99%)  Wt(kg): --  Height (cm): 172.7 (09-01-22 @ 11:32)  Weight (kg): 63.5 (09-01-22 @ 11:32)  BMI (kg/m2): 21.3 (09-01-22 @ 11:32)  BSA (m2): 1.76 (09-01-22 @ 11:32)      08-31-22 @ 07:01  -  09-01-22 @ 07:00  --------------------------------------------------------  IN: 440 mL / OUT: 900 mL / NET: -460 mL      Physical Exam:  	Gen NAD  	HEENT: no JVD  	Pulm: CTABL  	CV: S1S2,  	Abd: Soft,   	Ext:   - edema B/L LE   	Neuro: Awake and alert  	Skin: Warm and dry               LABS/STUDIES  --------------------------------------------------------------------------------              9.2    7.98  >-----------<  218      [09-01-22 @ 04:00]              28.5     Hemoglobin: 9.2 g/dL (09-01-22 @ 04:00)  Hemoglobin: 9.0 g/dL (08-31-22 @ 07:01)    Platelet Count - Automated: 218 K/uL (09-01-22 @ 04:00)  Platelet Count - Automated: 201 K/uL (08-31-22 @ 07:01)    134  |  98  |  90  ----------------------------<  138      [09-01-22 @ 04:00]  4.1   |  21  |  6.30        Ca     8.8     [09-01-22 @ 04:00]      Mg     2.30     [09-01-22 @ 04:00]      Phos  4.9     [09-01-22 @ 04:00]    TPro  6.1  /  Alb  3.5  /  TBili  0.3  /  DBili  x   /  AST  9   /  ALT  10  /  AlkPhos  71  [09-01-22 @ 04:00]    PT/INR: PT 12.7 , INR 1.09       [09-01-22 @ 04:00]      Creatinine, Serum: 6.30 mg/dL (09-01-22 @ 04:00)  Creatinine, Serum: 6.48 mg/dL (08-31-22 @ 07:01)  Creatinine, Serum: 6.30 mg/dL (08-30-22 @ 08:06)  Creatinine, Serum: 6.12 mg/dL (08-29-22 @ 01:30)  Creatinine, Serum: 5.95 mg/dL (08-28-22 @ 17:31)  Creatinine, Serum: 5.80 mg/dL (08-28-22 @ 07:41)  Creatinine, Serum: 5.86 mg/dL (08-27-22 @ 08:00)  Creatinine, Serum: 4.99 mg/dL (08-26-22 @ 06:34)  Creatinine, Serum: 4.81 mg/dL (08-25-22 @ 23:24)  Creatinine, Serum: 5.15 mg/dL (08-25-22 @ 19:35)    SODIUM TREND:  Sodium 134 [09-01 @ 04:00]  Sodium 138 [08-31 @ 07:01]  Sodium 137 [08-30 @ 08:06]  Sodium 136 [08-29 @ 01:30]  Sodium 136 [08-28 @ 17:31]  Sodium 138 [08-28 @ 07:41]  Sodium 138 [08-27 @ 08:00]  Sodium 136 [08-26 @ 06:34]  Sodium 137 [08-25 @ 23:24]  Sodium 140 [08-25 @ 19:35]        SCr 6.30 [09-01 @ 04:00]  SCr 6.48 [08-31 @ 07:01]  SCr 6.30 [08-30 @ 08:06]  SCr 6.12 [08-29 @ 01:30]  SCr 5.95 [08-28 @ 17:31]        Iron 41, TIBC 206, %sat 20      [08-26-22 @ 06:34]  Ferritin 257      [08-26-22 @ 06:34]  PTH -- (Ca --)      [08-26-22 @ 06:34]   86  Vitamin D (25OH) 16.1      [08-26-22 @ 06:34]    HCV 0.11, Nonreact      [08-26-22 @ 06:34]      
[facility] DIVISION OF KIDNEY DISEASES AND HYPERTENSION -- FOLLOW UP NOTE  HOLLY Fellow pager # [phone]  Nephrology office # [phone]  Available on [employer] teams--> Robin Javier  -----------------------------------------------------------------------------  Chief Complaint:/subjective:  s/p RC fistula yesterday no pain or SOB         24 hour events:            ALLERGIES & MEDICATIONS  --------------------------------------------------------------------------------  Allergies    No Known Allergies    Intolerances      Standing Inpatient Medications  calcium acetate 1334 milliGRAM(s) Oral three times a day with meals  carvedilol 12.5 milliGRAM(s) Oral every 12 hours  dextrose 5%. 1000 milliLiter(s) IV Continuous <Continuous>  dextrose 5%. 1000 milliLiter(s) IV Continuous <Continuous>  dextrose 50% Injectable 25 Gram(s) IV Push once  dextrose 50% Injectable 12.5 Gram(s) IV Push once  dextrose 50% Injectable 25 Gram(s) IV Push once  epoetin reilly-epbx (RETACRIT) Injectable 2000 Unit(s) IV Push every 7 days  furosemide    Tablet 40 milliGRAM(s) Oral two times a day  glucagon  Injectable 1 milliGRAM(s) IntraMuscular once  heparin   Injectable 5000 Unit(s) SubCutaneous every 8 hours  hydrALAZINE 100 milliGRAM(s) Oral three times a day  insulin lispro (ADMELOG) corrective regimen sliding scale   SubCutaneous three times a day before meals  insulin lispro (ADMELOG) corrective regimen sliding scale   SubCutaneous at bedtime  iron sucrose IVPB 200 milliGRAM(s) IV Intermittent every 24 hours  NIFEdipine  milliGRAM(s) Oral daily  sodium bicarbonate 1300 milliGRAM(s) Oral two times a day    PRN Inpatient Medications  acetaminophen     Tablet .. 650 milliGRAM(s) Oral every 6 hours PRN  dextrose Oral Gel 15 Gram(s) Oral once PRN      REVIEW OF SYSTEMS  --------------------------------------------------------------------------------    Gen: No  fevers/chills  Skin: No rashes  Respiratory: no SOB  CV: No chest pain,   GI: No abdominal pain  :   -oliguria   MSK: No joint pain/   -swelling;     All other systems were reviewed and are negative, except as noted.      VITALS/PHYSICAL EXAM  --------------------------------------------------------------------------------  T(C): 36.9 (09-02-22 @ 10:05), Max: 36.9 (09-02-22 @ 10:05)  HR: 68 (09-02-22 @ 10:05) (48 - 84)  BP: 149/69 (09-02-22 @ 10:05) (149/69 - 183/75)  RR: 18 (09-02-22 @ 10:05) (10 - 18)  SpO2: 98% (09-02-22 @ 10:05) (95% - 99%)  Wt(kg): --  Height (cm): 172.7 (09-01-22 @ 11:32)  Weight (kg): 63.5 (09-01-22 @ 11:32)  BMI (kg/m2): 21.3 (09-01-22 @ 11:32)  BSA (m2): 1.76 (09-01-22 @ 11:32)      09-01-22 @ 07:01  -  09-02-22 @ 07:00  --------------------------------------------------------  IN: 540 mL / OUT: 1150 mL / NET: -610 mL    09-02-22 @ 07:01  -  09-02-22 @ 10:59  --------------------------------------------------------  IN: 0 mL / OUT: 200 mL / NET: -200 mL      Physical Exam:  	Gen NAD  	HEENT: no JVD  	Pulm: CTABL  	CV: S1S2,  	Abd: Soft,   	Ext:   - edema B/L LE   	Neuro: Awake and alert  	Skin: Warm and dry.             Vascular R RC fistula + bruit                LABS/STUDIES  --------------------------------------------------------------------------------              9.4    7.37  >-----------<  215      [09-02-22 @ 06:57]              28.6     Hemoglobin: 9.4 g/dL (09-02-22 @ 06:57)  Hemoglobin: 9.2 g/dL (09-01-22 @ 04:00)    Platelet Count - Automated: 215 K/uL (09-02-22 @ 06:57)  Platelet Count - Automated: 218 K/uL (09-01-22 @ 04:00)    136  |  98  |  85  ----------------------------<  147      [09-02-22 @ 06:57]  4.0   |  22  |  6.26        Ca     8.5     [09-02-22 @ 06:57]      Mg     2.20     [09-02-22 @ 06:57]      Phos  5.1     [09-02-22 @ 06:57]    TPro  6.1  /  Alb  3.5  /  TBili  0.3  /  DBili  x   /  AST  9   /  ALT  10  /  AlkPhos  71  [09-01-22 @ 04:00]    PT/INR: PT 12.7 , INR 1.09       [09-01-22 @ 04:00]      Creatinine, Serum: 6.26 mg/dL (09-02-22 @ 06:57)  Creatinine, Serum: 6.30 mg/dL (09-01-22 @ 04:00)  Creatinine, Serum: 6.48 mg/dL (08-31-22 @ 07:01)  Creatinine, Serum: 6.30 mg/dL (08-30-22 @ 08:06)  Creatinine, Serum: 6.12 mg/dL (08-29-22 @ 01:30)  Creatinine, Serum: 5.95 mg/dL (08-28-22 @ 17:31)  Creatinine, Serum: 5.80 mg/dL (08-28-22 @ 07:41)  Creatinine, Serum: 5.86 mg/dL (08-27-22 @ 08:00)  Creatinine, Serum: 4.99 mg/dL (08-26-22 @ 06:34)  Creatinine, Serum: 4.81 mg/dL (08-25-22 @ 23:24)  Creatinine, Serum: 5.15 mg/dL (08-25-22 @ 19:35)    SODIUM TREND:  Sodium 136 [09-02 @ 06:57]  Sodium 134 [09-01 @ 04:00]  Sodium 138 [08-31 @ 07:01]  Sodium 137 [08-30 @ 08:06]  Sodium 136 [08-29 @ 01:30]  Sodium 136 [08-28 @ 17:31]  Sodium 138 [08-28 @ 07:41]  Sodium 138 [08-27 @ 08:00]  Sodium 136 [08-26 @ 06:34]  Sodium 137 [08-25 @ 23:24]        SCr 6.26 [09-02 @ 06:57]  SCr 6.30 [09-01 @ 04:00]  SCr 6.48 [08-31 @ 07:01]  SCr 6.30 [08-30 @ 08:06]  SCr 6.12 [08-29 @ 01:30]        Iron 41, TIBC 206, %sat 20      [08-26-22 @ 06:34]  Ferritin 257      [08-26-22 @ 06:34]  PTH -- (Ca --)      [08-26-22 @ 06:34]   86  Vitamin D (25OH) 16.1      [08-26-22 @ 06:34]    HCV 0.11, Nonreact      [08-26-22 @ 06:34]      
Laurie Wheatley  [facility] Medicine  Pager #[phone]  Available on [employer] Teams    Patient is a 71y old  Male who presents with a chief complaint of elevated BP, abnormal EKG (02 Sep 2022 12:39)      SUBJECTIVE / OVERNIGHT EVENTS: Patient seen and examined at bedside. S/p AVF creation yesterday. Denies having pain in his right arm currently. Feels tired.    ADDITIONAL REVIEW OF SYSTEMS:    MEDICATIONS  (STANDING):  calcium acetate 1334 milliGRAM(s) Oral three times a day with meals  carvedilol 12.5 milliGRAM(s) Oral every 12 hours  dextrose 5%. 1000 milliLiter(s) (100 mL/Hr) IV Continuous <Continuous>  dextrose 5%. 1000 milliLiter(s) (50 mL/Hr) IV Continuous <Continuous>  dextrose 50% Injectable 25 Gram(s) IV Push once  dextrose 50% Injectable 12.5 Gram(s) IV Push once  dextrose 50% Injectable 25 Gram(s) IV Push once  epoetin reilly-epbx (RETACRIT) Injectable 2000 Unit(s) IV Push every 7 days  furosemide    Tablet 40 milliGRAM(s) Oral two times a day  glucagon  Injectable 1 milliGRAM(s) IntraMuscular once  heparin   Injectable 5000 Unit(s) SubCutaneous every 8 hours  hydrALAZINE 100 milliGRAM(s) Oral three times a day  insulin lispro (ADMELOG) corrective regimen sliding scale   SubCutaneous three times a day before meals  insulin lispro (ADMELOG) corrective regimen sliding scale   SubCutaneous at bedtime  iron sucrose IVPB 200 milliGRAM(s) IV Intermittent every 24 hours  NIFEdipine  milliGRAM(s) Oral daily  sodium bicarbonate 1300 milliGRAM(s) Oral two times a day    MEDICATIONS  (PRN):  acetaminophen     Tablet .. 650 milliGRAM(s) Oral every 6 hours PRN Temp greater or equal to 38C (100.4F), Mild Pain (1 - 3)  dextrose Oral Gel 15 Gram(s) Oral once PRN Blood Glucose LESS THAN 70 milliGRAM(s)/deciliter      CAPILLARY BLOOD GLUCOSE      POCT Blood Glucose.: 277 mg/dL (02 Sep 2022 12:57)  POCT Blood Glucose.: 286 mg/dL (02 Sep 2022 12:08)  POCT Blood Glucose.: 155 mg/dL (02 Sep 2022 08:43)  POCT Blood Glucose.: 149 mg/dL (02 Sep 2022 08:20)  POCT Blood Glucose.: 140 mg/dL (01 Sep 2022 22:13)  POCT Blood Glucose.: 158 mg/dL (01 Sep 2022 16:11)    I&O's Summary    01 Sep 2022 07:01  -  02 Sep 2022 07:00  --------------------------------------------------------  IN: 540 mL / OUT: 1150 mL / NET: -610 mL    02 Sep 2022 07:01  -  02 Sep 2022 14:14  --------------------------------------------------------  IN: 0 mL / OUT: 200 mL / NET: -200 mL        PHYSICAL EXAM:    Vital Signs Last 24 Hrs  T(C): 36.9 (02 Sep 2022 10:05), Max: 36.9 (02 Sep 2022 10:05)  T(F): 98.4 (02 Sep 2022 10:05), Max: 98.4 (02 Sep 2022 10:05)  HR: 68 (02 Sep 2022 10:05) (48 - 84)  BP: 149/69 (02 Sep 2022 10:05) (149/69 - 183/75)  BP(mean): 88 (01 Sep 2022 19:15) (88 - 102)  RR: 18 (02 Sep 2022 10:05) (10 - 18)  SpO2: 98% (02 Sep 2022 10:05) (95% - 99%)    Parameters below as of 02 Sep 2022 10:05  Patient On (Oxygen Delivery Method): room air    CONSTITUTIONAL: NAD, well-developed, well-groomed  EYES: Conjunctiva and sclera clear  ENMT: Moist oral mucosa  RESPIRATORY: Normal respiratory effort; lungs are clear to auscultation bilaterally  CARDIOVASCULAR: Regular rate and rhythm, normal S1 and S2, no murmur/rub/gallop; No lower extremity edema  ABDOMEN: Soft, nontender to palpation, normoactive bowel sounds  PSYCH: A+O to person, place, and time; affect appropriate  NEUROLOGY: No focal deficits  SKIN: R wrist incision C/D/I, fistula with palpable thrill    LABS:                        9.4    7.37  )-----------( 215      ( 02 Sep 2022 06:57 )             28.6     09-02    136  |  98  |  85<H>  ----------------------------<  147<H>  4.0   |  22  |  6.26<H>    Ca    8.5      02 Sep 2022 06:57  Phos  5.1     09-02  Mg     2.20     09-02    TPro  6.1  /  Alb  3.5  /  TBili  0.3  /  DBili  x   /  AST  9   /  ALT  10  /  AlkPhos  71  09-01    PT/INR - ( 01 Sep 2022 04:00 )   PT: 12.7 sec;   INR: 1.09 ratio         RADIOLOGY & ADDITIONAL TESTS: No new imaging  Results Reviewed: Yes  Imaging Personally Reviewed:  Electrocardiogram Personally Reviewed:    COORDINATION OF CARE:  Care Discussed with Consultants/Other Providers [Y/N]:  Prior or Outpatient Records Reviewed [Y/N]:  
Laurie Lee  [facility] Medicine  Pager #[phone]  Available on [employer] Teams    Patient is a 71y old  Male who presents with a chief complaint of elevated BP, abnormal EKG (26 Aug 2022 11:25)      SUBJECTIVE / OVERNIGHT EVENTS: Patient seen and examined at bedside. Patient feels well, no complaints. Denies chest pain or SOB. States his BP is always high. Has been urinating well with Lasix.    ADDITIONAL REVIEW OF SYSTEMS:    MEDICATIONS  (STANDING):  carvedilol 6.25 milliGRAM(s) Oral every 12 hours  dextrose 5%. 1000 milliLiter(s) (100 mL/Hr) IV Continuous <Continuous>  dextrose 5%. 1000 milliLiter(s) (50 mL/Hr) IV Continuous <Continuous>  dextrose 50% Injectable 25 Gram(s) IV Push once  dextrose 50% Injectable 12.5 Gram(s) IV Push once  dextrose 50% Injectable 25 Gram(s) IV Push once  furosemide   Injectable 40 milliGRAM(s) IV Push two times a day  glucagon  Injectable 1 milliGRAM(s) IntraMuscular once  hydrALAZINE 100 milliGRAM(s) Oral three times a day  insulin lispro (ADMELOG) corrective regimen sliding scale   SubCutaneous three times a day before meals  insulin lispro (ADMELOG) corrective regimen sliding scale   SubCutaneous at bedtime  NIFEdipine XL 90 milliGRAM(s) Oral daily  sodium bicarbonate 650 milliGRAM(s) Oral three times a day    MEDICATIONS  (PRN):  acetaminophen     Tablet .. 650 milliGRAM(s) Oral every 6 hours PRN Temp greater or equal to 38C (100.4F), Mild Pain (1 - 3)  dextrose Oral Gel 15 Gram(s) Oral once PRN Blood Glucose LESS THAN 70 milliGRAM(s)/deciliter      CAPILLARY BLOOD GLUCOSE      POCT Blood Glucose.: 226 mg/dL (26 Aug 2022 12:35)  POCT Blood Glucose.: 159 mg/dL (26 Aug 2022 08:32)  POCT Blood Glucose.: 86 mg/dL (25 Aug 2022 22:19)  POCT Blood Glucose.: 94 mg/dL (25 Aug 2022 21:25)  POCT Blood Glucose.: 146 mg/dL (25 Aug 2022 17:49)    I&O's Summary    26 Aug 2022 07:01  -  26 Aug 2022 13:48  --------------------------------------------------------  IN: 0 mL / OUT: 280 mL / NET: -280 mL        PHYSICAL EXAM:    Vital Signs Last 24 Hrs  T(C): 36.7 (26 Aug 2022 10:00), Max: 37 (26 Aug 2022 01:16)  T(F): 98 (26 Aug 2022 10:00), Max: 98.6 (26 Aug 2022 01:16)  HR: 69 (26 Aug 2022 10:00) (54 - 69)  BP: 185/62 (26 Aug 2022 10:00) (185/62 - 209/74)  BP(mean): --  RR: 17 (26 Aug 2022 10:00) (13 - 20)  SpO2: 100% (26 Aug 2022 10:00) (99% - 100%)    Parameters below as of 26 Aug 2022 10:00  Patient On (Oxygen Delivery Method): room air    CONSTITUTIONAL: NAD, well-developed, well-groomed  EYES: Conjunctiva and sclera clear  ENMT: Moist oral mucosa  RESPIRATORY: Normal respiratory effort; lungs are clear to auscultation bilaterally  CARDIOVASCULAR: Regular rate and rhythm, normal S1 and S2, no murmur/rub/gallop; +1 pitting edema b/l lower legs around ankles  ABDOMEN: Soft, nontender to palpation, normoactive bowel sounds  PSYCH: A+O to person, place, and time; affect appropriate  NEUROLOGY: No focal deficits  SKIN: No rashes; no palpable lesions    LABS:                        7.5    7.71  )-----------( 195      ( 26 Aug 2022 06:34 )             24.3     08-26    136  |  104  |  54<H>  ----------------------------<  147<H>  4.8   |  18<L>  |  4.99<H>    Ca    9.3      26 Aug 2022 06:34  Phos  4.8     08-25  Mg     2.30     08-25    TPro  7.2  /  Alb  3.8  /  TBili  0.4  /  DBili  x   /  AST  16  /  ALT  10  /  AlkPhos  87  08-26      RADIOLOGY & ADDITIONAL TESTS: No new imaging  Results Reviewed: Yes  Imaging Personally Reviewed:  Electrocardiogram Personally Reviewed:    COORDINATION OF CARE:  Care Discussed with Consultants/Other Providers [Y/N]:  Prior or Outpatient Records Reviewed [Y/N]:  
[facility] Division of Kidney Diseases & Hypertension  FOLLOW UP NOTE  [phone]--------------------------------------------------------------------------------  HPI: 71 -year-old Male with hx of CKD5, DM, HTN, sent to [facility] on 8/25 by PMD (Dr. Prachi Omer - [phone]) for "abnormal EKG" and elevated blood pressure. Initial vitals niER, concerning severe HTN with BP of 209/74mmhg. Initial labs in ER, showed hyperkalemia of 5.8. Pt. received IV lasix 40 , insulin with D50, lokelma. Repeat Serum potassium was WNL at 4.3. Nephrology team consulted for CKD and hyperkalemia. Pt. is CKD stage 5 likely secondary to long standing DM and HTN. Pt. initially used to follow Dr. Mcwilliams and now follows with Dr. Amrita Minor (nephrologist). last visit on 8/1/22. Scr was elevated at 4.8 on 8/1/22. Pt. was made aware that he will need dialysis in near future. Scr was elevated at 5.15 on admission on 8/25/22.     24 hour events/subjective:  Pt. seen and examined at bedside. Pt. reports feeling well today, has good appetite. Denies SOB, CP, HA, N/V, abdominal pain, loss of appetite, urinary symptoms or dizziness.         PAST HISTORY  --------------------------------------------------------------------------------  No significant changes to PMH, PSH, FHx, SHx, unless otherwise noted    ALLERGIES & MEDICATIONS  --------------------------------------------------------------------------------  Allergies    No Known Allergies    Intolerances      Standing Inpatient Medications  calcium acetate 1334 milliGRAM(s) Oral three times a day with meals  carvedilol 6.25 milliGRAM(s) Oral every 12 hours  dextrose 5%. 1000 milliLiter(s) IV Continuous <Continuous>  dextrose 5%. 1000 milliLiter(s) IV Continuous <Continuous>  dextrose 50% Injectable 25 Gram(s) IV Push once  dextrose 50% Injectable 12.5 Gram(s) IV Push once  dextrose 50% Injectable 25 Gram(s) IV Push once  epoetin reilly-epbx (RETACRIT) Injectable 2000 Unit(s) IV Push every 7 days  furosemide   Injectable 40 milliGRAM(s) IV Push daily  glucagon  Injectable 1 milliGRAM(s) IntraMuscular once  hydrALAZINE 100 milliGRAM(s) Oral three times a day  insulin lispro (ADMELOG) corrective regimen sliding scale   SubCutaneous three times a day before meals  insulin lispro (ADMELOG) corrective regimen sliding scale   SubCutaneous at bedtime  iron sucrose IVPB 200 milliGRAM(s) IV Intermittent every 24 hours  lactated ringers. 1000 milliLiter(s) IV Continuous <Continuous>  NIFEdipine  milliGRAM(s) Oral daily  sodium bicarbonate 1300 milliGRAM(s) Oral two times a day    PRN Inpatient Medications  acetaminophen     Tablet .. 650 milliGRAM(s) Oral every 6 hours PRN  dextrose Oral Gel 15 Gram(s) Oral once PRN      REVIEW OF SYSTEMS  --------------------------------------------------------------------------------  Gen: No  fevers/chills  Head/Eyes/Ears/Mouth: No headache  Respiratory: No dyspnea  CV: No chest pain  GI: No abdominal pain, diarrhea, constipation, nausea, vomiting  : No increased frequency, dysuria, hematuria  MSK: No edema  Neuro: No dizziness/lightheadedness  All other systems were reviewed and are negative, except as noted.    VITALS/PHYSICAL EXAM  --------------------------------------------------------------------------------  T(C): 36.8 (08-31-22 @ 12:30), Max: 36.9 (08-30-22 @ 22:10)  HR: 51 (08-31-22 @ 12:30) (51 - 72)  BP: 155/72 (08-31-22 @ 12:30) (155/72 - 168/66)  RR: 18 (08-31-22 @ 12:30) (17 - 18)  SpO2: 99% (08-31-22 @ 12:30) (99% - 100%)  Wt(kg): --        08-30-22 @ 07:01  -  08-31-22 @ 07:00  --------------------------------------------------------  IN: 1000 mL / OUT: 1800 mL / NET: -800 mL      Physical Exam:  	Gen: NAD  	HEENT: Anicteric, no edema  	Pulm: CTA B/L  	CV: S1S2+, 1/6 systolic murmur appreciated at LLSB  	Abd: Soft, +BS, NTND  	Ext: No LE edema B/L  	Neuro: Awake, alert, speech clear and fluent without facial asymmetry  	Skin: Warm and dry  	Dialysis access: None    LABS/STUDIES  --------------------------------------------------------------------------------              9.0    5.94  >-----------<  201      [08-31-22 @ 07:01]              27.6     138  |  100  |  91  ----------------------------<  167      [08-31-22 @ 07:01]  4.1   |  22  |  6.48        Ca     8.4     [08-31-22 @ 07:01]      Mg     2.20     [08-31-22 @ 07:01]      Phos  5.4     [08-31-22 @ 07:01]            Creatinine Trend:  SCr 6.48 [08-31 @ 07:01]  SCr 6.30 [08-30 @ 08:06]  SCr 6.12 [08-29 @ 01:30]  SCr 5.95 [08-28 @ 17:31]  SCr 5.80 [08-28 @ 07:41]        Iron 41, TIBC 206, %sat 20      [08-26-22 @ 06:34]  Ferritin 257      [08-26-22 @ 06:34]  PTH -- (Ca --)      [08-26-22 @ 06:34]   86  Vitamin D (25OH) 16.1      [08-26-22 @ 06:34]    HCV 0.11, Nonreact      [08-26-22 @ 06:34]    
    Patient is a 71y old  Male who presents with a chief complaint of elevated BP, abnormal EKG (27 Aug 2022 11:29)      SUBJECTIVE / OVERNIGHT EVENTS: Pt did well overnight, got some sleep. Ate some breakfast this morning. Denies any N/V/D or bleeding.    MEDICATIONS  (STANDING):  carvedilol 6.25 milliGRAM(s) Oral every 12 hours  dextrose 5%. 1000 milliLiter(s) (100 mL/Hr) IV Continuous <Continuous>  dextrose 5%. 1000 milliLiter(s) (50 mL/Hr) IV Continuous <Continuous>  dextrose 50% Injectable 25 Gram(s) IV Push once  dextrose 50% Injectable 12.5 Gram(s) IV Push once  dextrose 50% Injectable 25 Gram(s) IV Push once  furosemide   Injectable 40 milliGRAM(s) IV Push two times a day  glucagon  Injectable 1 milliGRAM(s) IntraMuscular once  hydrALAZINE 100 milliGRAM(s) Oral three times a day  insulin lispro (ADMELOG) corrective regimen sliding scale   SubCutaneous three times a day before meals  insulin lispro (ADMELOG) corrective regimen sliding scale   SubCutaneous at bedtime  NIFEdipine XL 90 milliGRAM(s) Oral daily  sodium bicarbonate 1300 milliGRAM(s) Oral two times a day    MEDICATIONS  (PRN):  acetaminophen     Tablet .. 650 milliGRAM(s) Oral every 6 hours PRN Temp greater or equal to 38C (100.4F), Mild Pain (1 - 3)  dextrose Oral Gel 15 Gram(s) Oral once PRN Blood Glucose LESS THAN 70 milliGRAM(s)/deciliter      CAPILLARY BLOOD GLUCOSE      POCT Blood Glucose.: 211 mg/dL (28 Aug 2022 08:54)  POCT Blood Glucose.: 199 mg/dL (27 Aug 2022 22:23)  POCT Blood Glucose.: 120 mg/dL (27 Aug 2022 16:54)  POCT Blood Glucose.: 264 mg/dL (27 Aug 2022 12:32)    I&O's Summary    27 Aug 2022 07:01  -  28 Aug 2022 07:00  --------------------------------------------------------  IN: 480 mL / OUT: 500 mL / NET: -20 mL        PHYSICAL EXAM:  Vital Signs Last 24 Hrs  T(C): 36.6 (28 Aug 2022 09:30), Max: 36.7 (27 Aug 2022 21:30)  T(F): 97.8 (28 Aug 2022 09:30), Max: 98 (27 Aug 2022 21:30)  HR: 55 (28 Aug 2022 09:30) (51 - 64)  BP: 135/57 (28 Aug 2022 09:30) (134/53 - 156/53)  BP(mean): --  RR: 16 (28 Aug 2022 09:30) (16 - 19)  SpO2: 98% (28 Aug 2022 09:30) (98% - 100%)    Parameters below as of 28 Aug 2022 09:30  Patient On (Oxygen Delivery Method): room air      CONSTITUTIONAL: NAD, well-developed, well-groomed  EYES: PERRLA; conjunctiva and sclera clear  ENMT: Moist oral mucosa, no pharyngeal injection or exudates; normal dentition  NECK: Supple, no palpable masses; no thyromegaly  RESPIRATORY: Normal respiratory effort; lungs are clear to auscultation bilaterally  CARDIOVASCULAR: Regular rate and rhythm, normal S1 and S2, no murmur/rub/gallop; No lower extremity edema; Peripheral pulses are 2+ bilaterally  ABDOMEN: Nontender to palpation, normoactive bowel sounds, no rebound/guarding; No hepatosplenomegaly  MUSCULOSKELETAL:  Normal gait; no clubbing or cyanosis of digits; no joint swelling or tenderness to palpation  PSYCH: A+O to person, place, and time; affect appropriate  NEUROLOGY: CN 2-12 are intact and symmetric; no gross sensory deficits   SKIN: No rashes; no palpable lesions    LABS:                        6.8    5.40  )-----------( 197      ( 28 Aug 2022 07:41 )             21.4     08-28    138  |  104  |  66<H>  ----------------------------<  146<H>  4.5   |  19<L>  |  5.80<H>    Ca    8.2<L>      28 Aug 2022 07:41  Phos  6.4     08-28  Mg     2.20     08-28                  RADIOLOGY & ADDITIONAL TESTS:  Results Reviewed:   Imaging Personally Reviewed:  Electrocardiogram Personally Reviewed:    COORDINATION OF CARE:  Care Discussed with Consultants/Other Providers [Y/N]:  Prior or Outpatient Records Reviewed [Y/N]:  
[facility] DIVISION OF KIDNEY DISEASES AND HYPERTENSION -- FOLLOW UP NOTE  --------------------------------------------------------------------------------  HPI: 71 -year-old Male with hx of CKD5, DM, HTN, sent to [facility] on 8/25 by PMD (Dr. Prachi Omer - [phone]) for "abnormal EKG" and elevated blood pressure. Initial vitals niER, concerning severe HTN with BP of 209/74mmhg. Initial labs in ER, showed hyperkalemia of 5.8. Pt. received IV lasix 40 , insulin with D50, lokelma. Repeat Serum potassium was WNL at 4.3. Nephrology team consulted for CKD and hyperkalemia.     Pt. is CKD stage 5 likely secondary to long standing DM and HTN. Pt. initially used to follow Dr. Mcwilliams and now follows with Dr. Amrita Minor (nephrologist). last visit on 8/1/22. Scr was elevated at 4.8 on 8/1/22. Pt. was made aware that he will need dialysis in near future. Scr was elevated at 5.15 on admission on 8/25/22.     Pt. seen and examined at bedside. Pt. reports feeling better today. Denies SOB, CP, HA, N/V, abdominal pain , urinary symptoms or dizziness.     PAST HISTORY  --------------------------------------------------------------------------------  No significant changes to PMH, PSH, FHx, SHx, unless otherwise noted    ALLERGIES & MEDICATIONS  --------------------------------------------------------------------------------  Allergies    No Known Allergies    Intolerances    Standing Inpatient Medications  carvedilol 6.25 milliGRAM(s) Oral every 12 hours  dextrose 5%. 1000 milliLiter(s) IV Continuous <Continuous>  dextrose 5%. 1000 milliLiter(s) IV Continuous <Continuous>  dextrose 50% Injectable 25 Gram(s) IV Push once  dextrose 50% Injectable 12.5 Gram(s) IV Push once  dextrose 50% Injectable 25 Gram(s) IV Push once  furosemide   Injectable 40 milliGRAM(s) IV Push two times a day  glucagon  Injectable 1 milliGRAM(s) IntraMuscular once  hydrALAZINE 100 milliGRAM(s) Oral three times a day  insulin lispro (ADMELOG) corrective regimen sliding scale   SubCutaneous three times a day before meals  insulin lispro (ADMELOG) corrective regimen sliding scale   SubCutaneous at bedtime  NIFEdipine XL 90 milliGRAM(s) Oral daily  sodium bicarbonate 650 milliGRAM(s) Oral three times a day    PRN Inpatient Medications  acetaminophen     Tablet .. 650 milliGRAM(s) Oral every 6 hours PRN  dextrose Oral Gel 15 Gram(s) Oral once PRN    REVIEW OF SYSTEMS  --------------------------------------------------------------------------------  Gen: No fevers   Respiratory: No dyspnea  CV: No chest pain  GI: No abdominal pain  : No dysuria  MSK: No  edema  Neuro: no dizziness  All other systems were reviewed and are negative, except as noted.    VITALS/PHYSICAL EXAM  --------------------------------------------------------------------------------  T(C): 36.5 (08-27-22 @ 10:16), Max: 36.9 (08-26-22 @ 13:55)  HR: 55 (08-27-22 @ 10:16) (55 - 74)  BP: 129/48 (08-27-22 @ 10:16) (126/56 - 182/68)  RR: 18 (08-27-22 @ 10:16) (17 - 18)  SpO2: 99% (08-27-22 @ 10:16) (98% - 100%)  Wt(kg): --  Height (cm): 172.7 (08-26-22 @ 22:00)  Weight (kg): 69.2 (08-26-22 @ 22:00)  BMI (kg/m2): 23.2 (08-26-22 @ 22:00)  BSA (m2): 1.82 (08-26-22 @ 22:00)    08-26-22 @ 07:01  -  08-27-22 @ 07:00  --------------------------------------------------------  IN: 480 mL / OUT: 480 mL / NET: 0 mL    08-27-22 @ 07:01  -  08-27-22 @ 11:29  --------------------------------------------------------  IN: 0 mL / OUT: 200 mL / NET: -200 mL    Physical Exam:  	Gen: NAD  	HEENT: Anicteric, no edema  	Pulm: CTA B/L  	CV: S1S2+, 1/6 systolic murmur appreciated at LLSB  	Abd: Soft, +BS, NTND  	Ext: No LE edema B/L  	Neuro: Awake, alert, speech clear and fluent without facial asymmetry  	Skin: Warm and dry  	Dialysis access: None    LABS/STUDIES  --------------------------------------------------------------------------------              6.9    6.88  >-----------<  200      [08-27-22 @ 08:00]              22.7     138  |  106  |  58  ----------------------------<  180      [08-27-22 @ 08:00]  5.0   |  17  |  5.86        Ca     8.6     [08-27-22 @ 08:00]      Mg     2.20     [08-27-22 @ 08:00]      Phos  5.6     [08-27-22 @ 08:00]    TPro  7.2  /  Alb  3.8  /  TBili  0.4  /  DBili  x   /  AST  16  /  ALT  10  /  AlkPhos  87  [08-26-22 @ 06:34]    Creatinine Trend:  SCr 5.86 [08-27 @ 08:00]  SCr 4.99 [08-26 @ 06:34]  SCr 4.81 [08-25 @ 23:24]  SCr 5.15 [08-25 @ 19:35]    Iron 41, TIBC 206, %sat 20      [08-26-22 @ 06:34]  Ferritin 257      [08-26-22 @ 06:34]  PTH -- (Ca --)      [08-26-22 @ 06:34]   86  Vitamin D (25OH) 16.1      [08-26-22 @ 06:34]    HCV 0.11, Nonreact      [08-26-22 @ 06:34]

## 2022-09-02 NOTE — PROGRESS NOTE ADULT - REASON FOR ADMISSION
elevated BP, abnormal EKG

## 2022-09-02 NOTE — PROGRESS NOTE ADULT - PROBLEM SELECTOR PROBLEM 5
Anemia secondary to renal failure
Hyperphosphatemia
Anemia secondary to renal failure
Anemia secondary to renal failure
Hypertensive urgency
Anemia secondary to renal failure
Hyperphosphatemia
Healthcare maintenance
Anemia secondary to renal failure
Healthcare maintenance
Hyperphosphatemia

## 2022-09-02 NOTE — PROGRESS NOTE ADULT - PROBLEM SELECTOR PROBLEM 6
Healthcare maintenance
Hypertensive urgency
Healthcare maintenance
Healthcare maintenance
Hypertensive urgency
Hypertensive urgency
Healthcare maintenance
Hypertensive urgency
Healthcare maintenance
Hypertensive urgency

## 2022-09-02 NOTE — DISCHARGE NOTE NURSING/CASE MANAGEMENT/SOCIAL WORK - PATIENT PORTAL LINK FT
You can access the FollowMyHealth Patient Portal offered by [facility] by registering at the following website: [email or web address]. By joining [employer]’s FollowMyHealth portal, you will also be able to view your health information using other applications (apps) compatible with our system.

## 2022-09-02 NOTE — PROGRESS NOTE ADULT - ASSESSMENT
70 y/o M with hx of CKD5 not on dialysis, Type 2 DM, HTN, currently on renal transplant list, sent by PMD for peaked T waves on EKG and hypertension, found to be hyperkalemic with mild metabolic acidosis, admitted for management of CKD Stage 5. S/p AVF creation on 9/1.

## 2022-09-02 NOTE — DISCHARGE NOTE PROVIDER - NSDCMRMEDTOKEN_GEN_ALL_CORE_FT
acetaminophen 325 mg oral tablet: 2 tab(s) orally every 6 hours, As needed for pain  atorvastatin 20 mg oral tablet: 1 tab(s) orally once a day  calcium acetate 667 mg oral tablet: 2 tab(s) orally 2 times a day   carvedilol 12.5 mg oral tablet: 1 tab(s) orally every 12 hours  furosemide 40 mg oral tablet: 1 tab(s) orally 2 times a day  hydrALAZINE 100 mg oral tablet: 1 tab(s) orally 3 times a day  NIFEdipine 60 mg oral tablet, extended release: 2 tab(s) orally once a day  polymyxin B-trimethoprim 10,000 units-1 mg/mL ophthalmic solution: 2 drop(s) in each affected eye 4 times a day x 5 days   RELION NOVOLIN N FLEXPEN INJ: INJECT 8 UNITS SUBCUTANEOUSLY AT 10 AM AND 7 UNITS SUBCUTANEOUSLY AT 10 PM  sodium bicarbonate 650 mg oral tablet: 2 tab(s) orally 2 times a day

## 2022-09-02 NOTE — DISCHARGE NOTE PROVIDER - PROVIDER TOKENS
PROVIDER:[TOKEN:[[id number]],FOLLOWUP:[1 week]],PROVIDER:[TOKEN:[[id number]],FOLLOWUP:[2 weeks]]

## 2022-09-02 NOTE — PROGRESS NOTE ADULT - PROBLEM SELECTOR PROBLEM 1
Stage 5 chronic kidney disease not on chronic dialysis
Hyperkalemia
Stage 5 chronic kidney disease not on chronic dialysis
Hyperkalemia
Stage 5 chronic kidney disease not on chronic dialysis
Hyperkalemia

## 2022-09-02 NOTE — DISCHARGE NOTE PROVIDER - NSDCFUADDAPPT_GEN_ALL_CORE_FT
follow up with Dr. Lockett Vascular Surgeon in 2 weeks to monitor your AVF site   follow up with Nephrologist Dr. Minor in 1 week

## 2022-09-02 NOTE — PROGRESS NOTE ADULT - PROBLEM SELECTOR PLAN 4
Pt. with metabolic acidosis in the setting of advanced CKD. Serum Co2 is low at 22 today. Recommend continuing sodium bicarb to 1300 mg po BID. Monitor serum CO2 daily.
Pt. with metabolic acidosis in the setting of advanced CKD. Serum Co2 is low at 17 today. Recommend increasing sodium bicarb to 1300 mg po BID. Monitor serum CO2 daily.
Pt. with metabolic acidosis in the setting of advanced CKD. Serum Co2 is low at 17 today. Recommend increasing sodium bicarb to 1300 mg po BID. Monitor serum CO2 daily.
Pt with known anemia from renal failure/ anemia of chronic disease, with Hg 8.3 on 8/10 but then dropped to 6.8  - no signs of bleeding  - started on IV iron x5 days and Epogen  - continue to monitor  - s/p 1u PRBCs
Pt. with metabolic acidosis in the setting of advanced CKD. Serum Co2 is low at 22 today. Recommend continuing sodium bicarb to 1300 mg po BID. Monitor serum CO2 daily.
Pt at home on Novolin 8 units in AM and 8 units in PM. Recent outpatient A1c 9.9%  - A1c 9.4%  - d/w pharmacy- recommend resume 80% of home insulin dose as inpatient which would be approximately Lantus 12u qHS and ISS qac  - FS acceptable, will hold off on starting Lantus at this time, monitor FS trend
Pt with known anemia from renal failure/ anemia of chronic disease, with Hg 8.3 on 8/10 but now declined to 6.8  - no signs of bleeding  - started on IV iron x5 days and Epogen  - continue to monitor  - f/u Hg electrophoresis  - s/p 1u PRBCs
Pt at home on Novolin 8 units in AM and 8 units in PM. Recent outpatient A1c 9.9%  - A1c 9.4%  - d/w pharmacy- recommend resume 80% of home insulin dose as inpatient which would be approximately Lantus 12u qHS and ISS qac  - FS acceptable, will hold off on starting Lantus at this time, monitor FS trend
Pt at home on Novolin 8 units in AM and 8 units in PM. Recent outpatient A1c 9.9.   - A1c added on  - d/w pharmacy- recommend resume 80% of home insulin dose as inpatient which would be approximately Lantus 12u qHS and ISS qac  - AM FS acceptable, will hold off on starting Lantus at this time, monitor FS trend
Pt at home on Novolin 8 units in AM and 8 units in PM. Recent outpatient A1c 9.9.   - A1c added on  - d/w pharmacy- recommend resume 80% of home insulin dose as inpatient which would be approximately Lantus 12u qHS and ISS qac  - AM FS acceptable, will hold off on starting Lantus at this time, monitor FS trend
Pt. with metabolic acidosis in the setting of advanced CKD. Serum Co2 is low at 22 today. Recommend continuing sodium bicarb to 1300 mg po BID. Monitor serum CO2 daily.
Pt at home on Novolin 8 units in AM and 8 units in PM. Recent outpatient A1c 9.9.   - A1c added on  - d/w pharmacy- recommend resume 80% of home insulin dose as inpatient which would be approximately Lantus 12u qHS and ISS qac  - AM FS acceptable, will hold off on starting Lantus at this time, monitor FS trend
Pt. with metabolic acidosis in the setting of advanced CKD. Serum Co2 is low at 21 today. Recommend continuing sodium bicarb to 1300 mg po BID. Monitor serum CO2 daily.

## 2022-09-02 NOTE — PROGRESS NOTE ADULT - PROVIDER SPECIALTY LIST ADULT
Vascular Surgery
Nephrology
Hospitalist
Nephrology
Nephrology
Hospitalist
Hospitalist
Nephrology
Nephrology
Hospitalist
Nephrology

## 2022-09-02 NOTE — CHART NOTE - NSCHARTNOTEFT_GEN_A_CORE
POST-OPERATIVE CHECK    Patient is s/p left brachiocephalic fistula creation    Subjective:  Left arm in sling and resting peacefully in PACU  Patient reports no pain at the fistula site  Patient able to wiggle his fingers and squeeze my fingers    PAST MEDICAL & SURGICAL HISTORY:  Diabetes  Stage 5 chronic kidney disease not on chronic dialysis  No significant past surgical history      Objective:  Vital Signs Last 24 Hrs  T(C): 36.5 (01 Sep 2022 18:45), Max: 36.7 (31 Aug 2022 22:20)  T(F): 97.7 (01 Sep 2022 18:45), Max: 98.1 (31 Aug 2022 22:20)  HR: 48 (01 Sep 2022 19:15) (48 - 84)  BP: 165/65 (01 Sep 2022 19:15) (158/72 - 183/75)  BP(mean): 88 (01 Sep 2022 19:15) (88 - 102)  RR: 14 (01 Sep 2022 19:15) (10 - 18)  SpO2: 97% (01 Sep 2022 19:15) (95% - 99%)    Parameters below as of 01 Sep 2022 16:45  Patient On (Oxygen Delivery Method): room air        PHYSICAL EXAM:  Constitutional: NAD.  Neuro: AOx3  Respiratory:  Normal respiratory effort  Extremities: No edema, pressure dressing over AVF site, no strikethrough  Vascular: Bruit present with doppler, thrill palpable      I&O's Detail    31 Aug 2022 07:01  -  01 Sep 2022 07:00  --------------------------------------------------------  IN:    Oral Fluid: 440 mL  Total IN: 440 mL    OUT:    Voided (mL): 900 mL  Total OUT: 900 mL    Total NET: -460 mL      01 Sep 2022 07:01  -  01 Sep 2022 20:21  --------------------------------------------------------  IN:    Oral Fluid: 240 mL  Total IN: 240 mL    OUT:    Voided (mL): 200 mL  Total OUT: 200 mL    Total NET: 40 mL      LABS:                        9.2    7.98  )-----------( 218      ( 01 Sep 2022 04:00 )             28.5     09-01    134<L>  |  98  |  90<H>  ----------------------------<  138<H>  4.1   |  21<L>  |  6.30<H>    Ca    8.8      01 Sep 2022 04:00  Phos  4.9     09-01  Mg     2.30     09-01    TPro  6.1  /  Alb  3.5  /  TBili  0.3  /  DBili  x   /  AST  9   /  ALT  10  /  AlkPhos  71  09-01    PT/INR - ( 01 Sep 2022 04:00 )   PT: 12.7 sec;   INR: 1.09 ratio        POCT Blood Glucose.: 158 mg/dL (01 Sep 2022 16:11)  POCT Blood Glucose.: 147 mg/dL (01 Sep 2022 13:25)  POCT Blood Glucose.: 198 mg/dL (01 Sep 2022 08:29)  POCT Blood Glucose.: 201 mg/dL (31 Aug 2022 21:40)      carvedilol 12.5  heparin   Injectable 5000  hydrALAZINE 100  NIFEdipine       Assessment:  The patient is a 71y Male who is now several hours post-op from a left brachiocephalic AVF creation    Plan:  - Pain control as needed, PO Tylenol/Dilaudid  - Reg Renal diet  - DVT ppx  - OOB and ambulating as tolerated
Unable to see patient today- attempted at 11 AM but patient was off the floor in PACU and at 2:30 PM patient was in the OR.    Nephrology note reviewed- will change Lasix to 40mg PO BID starting tomorrow, and increase Coreg to 12.5mg PO BID.    Continue to monitor renal function and BP.    D/c planning tomorrow
Patient requested to speak with RD regarding nutrition education f/u. Patient received nutrition education per RD note (8/30). Reviewed renal and diabetes nutrition therapy w/ patient and answered all f/u questions patient had. Will continue to monitor and f/u per protocol.

## 2022-09-02 NOTE — PROGRESS NOTE ADULT - PROBLEM SELECTOR PROBLEM 3
Anemia secondary to renal failure
Hypertensive urgency
Hypertensive urgency
Anemia secondary to renal failure
Preop examination
Anemia secondary to renal failure
Type 2 diabetes mellitus
Anemia secondary to renal failure
Preop examination
Type 2 diabetes mellitus
Anemia secondary to renal failure
Anemia secondary to renal failure
Hypertensive urgency

## 2022-09-07 ENCOUNTER — APPOINTMENT (OUTPATIENT)
Dept: ENDOCRINOLOGY | Facility: CLINIC | Age: 72
End: 2022-09-07

## 2022-09-07 VITALS
OXYGEN SATURATION: 96 % | HEIGHT: 68 IN | BODY MASS INDEX: 21.07 KG/M2 | WEIGHT: 139 LBS | SYSTOLIC BLOOD PRESSURE: 122 MMHG | DIASTOLIC BLOOD PRESSURE: 60 MMHG | TEMPERATURE: 98 F | HEART RATE: 49 BPM | RESPIRATION RATE: 16 BRPM

## 2022-09-07 LAB — GLUCOSE BLDC GLUCOMTR-MCNC: 338

## 2022-09-07 PROCEDURE — 99214 OFFICE O/P EST MOD 30 MIN: CPT | Mod: 25

## 2022-09-07 PROCEDURE — 82962 GLUCOSE BLOOD TEST: CPT

## 2022-09-07 RX ORDER — PIOGLITAZONE HYDROCHLORIDE 15 MG/1
15 TABLET ORAL
Qty: 90 | Refills: 2 | Status: DISCONTINUED | COMMUNITY
Start: 2021-01-18 | End: 2022-09-07

## 2022-09-07 RX ORDER — INSULIN LISPRO-AABC 100 [IU]/ML
100 INJECTION, SOLUTION SUBCUTANEOUS
Qty: 3 | Refills: 6 | Status: ACTIVE | COMMUNITY
Start: 2022-09-07 | End: 1900-01-01

## 2022-09-07 NOTE — HISTORY OF PRESENT ILLNESS
[FreeTextEntry1] : F/u for diabetes management\par \par *** Sep 07, 2022 ***\par \par admitted 2 wks ago for uncontrolled HTN. planned for HD. being evaluated by a transplant team\par presently on Relion novolin N 8 un in am and 7 un in pm\par reports fbs- 133-159, HS- upto 319\par this am- 194, in the office ppg- 338\par a1c- 9.4%\par \par *** May 26, 2022 ***\par \par lost insurance again, was not taking most of his meds for some time\par resumed Novolog 70/30 8 un at HS (?), prandin 1 mg tid ac , norvasc 10 mg, losartan 50 mg \par not checking his FS\par \par \par *** Feb 10, 2022 ***\par \par missed f/u appts\par was in [place]- stopped meds for some time, was admitted for acute renal failure, uncontrolled diabetes\par no recent labs\par still taking prandin, cozaar, norvasc, chlorthalidone. Off metformin. not taking pioglitazone\par also, taking novolin 70/30 (?- not sure how much and prescribed by whom)\par log: fbs- 111-264, pm- 102-264\par \par *** Dec 14, 2020 ***\par \par received Christiano 2- teaching today\par takes Relion 9-10,  metformin er 500mg 3 tablets with dinner ,  actos 15 mg, prandin 1-2-2\par no more leg swelling\par reports fbs- 88- upto 130, ppg-  low 100's\par \par *** Sep 14, 2020 ***\par \par resumed Relion 8-7,  metformin er 500mg 3 tablets with dinner,  actos 15 mg,  prandin 1-2-2\par had labs done this am for potassium recheck- still pending results\par states that his sugars are better since changing the regimen\par \par *** Sep 09, 2020 ***\par \par unable to afford any basal insulin\par takes ReliOn 8 un in am, 7 un in pm,  metformin er 500mg 2 tablets with dinner ,  actos 15 mg,   drisdol 50K qw,  lipitor 20mg HS,  cozaar 100mg, norvasc 5 mg\par swelling resolved with decreasing actos\par did not start prandin yet\par reports fbs- 115's, not checking ppg\par stopped insulin few days ago for unclear reason. today ppg in the office- 317\par \par *** Jun 12, 2020 ***\par \par taking Basaglar 14 units HS, metformin er 500mg 2 tablets with dinner ,  amaryl 4mg qd , actos 30mg qd, drisdol 50K qw\par feels much better. noticed some ankle swelling\par tolerates metformin very well\par reports fbs in 80's- low 90's, not checking ppg\par \par Date of download:  6/12/20\par Diabetes Medications and Dosage: as above\par Indication for CGMS: verify a change in the treatment regimen, identify periods of hypoglycemia/ hyperglycemia. \par Modal day report: pattern. \par Pt with HYPO 4 % of the time (5% below 54),  52% in target range\par Hyperglycemia:  39% elevation \par Identified issues: carbohydrate counting, spiking post lunch and dinner\par dates analyzed: 5/29/20-6/12/20\par \par \par *** May 29, 2020 ***\par \par ran out of Soliqua about a month ago (not covered)  and his blood pressure medications\par diarrhea on metformin 1g bid\par taking actos 30 mg qd\par states that FS were in 90's when was taking Soliqua, now reports fbs and ppg in 180's- 200's\par \par HISTORY OF PRESENT ILLNESS. \par \par Mr. BLAND was diagnosed with Diabetes Mellitus Type 2 in 2001 \par Reports history HTN, dyslipidemia, proteinuria/nephropathy. \par He denies CAD,  known complications of retinopathy or neuropathy. \par Presently on metformin 500 mg tid, amaryl 4mg qd, prandin 1mg tid, losartan 100mg, norvasc 5 mg, lipitor 20mg HS\par He stopped checking his blood sugars at home. \par Hypoglycemia frequency: occasional subjective hypoglycemia\par Fingerstick glucose in the office today is 196 mg/dL 2 hours after eating. \par Diet: not following ADA\par Exercise: \par \par Lab review: a1c- 10.8 <-- 9.7 <--13.2; creatinine- 1.45 <-- 1.42, 25D- 13.6, macroalbuminuria, TSH- 2.67, LDL- 136\par \par ****\par \par Last dilated eye - 9/19\par Last podiatry visit  - many years ago\par Last cardiology evaluation - 8/19\par Last stress test - 8/19\par Last 2-D Echo 8/19\par Last nephrology evaluation - 2019\par Last neurology evaluation- none\par

## 2022-09-07 NOTE — ASSESSMENT
[Incretin Mimetic Therapy] : Risks and benefits of incretin mimetic therapy were discussed with the patient including nauseau, pancreatitis and potential risk of medullary thyroid cancer [Diabetes Foot Care] : diabetes foot care [Long Term Vascular Complications] : long term vascular complications of diabetes [Carbohydrate Consistent Diet] : carbohydrate consistent diet [Importance of Diet and Exercise] : importance of diet and exercise to improve glycemic control, achieve weight loss and improve cardiovascular health [Exercise/Effect on Glucose] : exercise/effect on glucose [Hypoglycemia Management] : hypoglycemia management [Glucagon Use] : glucagon use [Ketone Testing] : ketone testing [Action and use of Insulin] : action and use of short and long-acting insulin [Self Monitoring of Blood Glucose] : self monitoring of blood glucose [Insulin Self-Administration] : insulin self-administration [Injection Technique, Storage, Sharps Disposal] : injection technique, storage, and sharps disposal [Sick-Day Management] : sick-day management [Retinopathy Screening] : Patient was referred to ophthalmology for retinopathy screening [Diabetic Medications] : Risks and benefits of diabetic medications were discussed [FreeTextEntry1] : T2DM, severely uncontrolled\par - extremely poor compliance- advised to the patient and his wife on meds adherence and danger of diabetic complications\par - Christiano 2 (samples given)\par - no SGLT2 given very low GFR. F/u with renal!\par - d/c Relion\par - Basaglar 14 un HS\par - Lyumjev tid ac  meals (3-4-5-0-3-1-9-10-11)\par - continue lipitor 20mg HS,  cozaar 50mg, norvasc 10 mg but monitor for feet swelling\par - he declined personal sensors\par - follow up plasma K+ results. low K+ diet. might have to stop cozaar\par - again advised to f/u with renal\par \par RTC 2- 3 weeks for sensor download

## 2022-09-08 RX ORDER — INSULIN GLARGINE 100 [IU]/ML
100 INJECTION, SOLUTION SUBCUTANEOUS
Qty: 2 | Refills: 6 | Status: DISCONTINUED | COMMUNITY
Start: 2020-05-29 | End: 2022-09-08

## 2022-09-08 RX ORDER — INSULIN DEGLUDEC INJECTION 100 U/ML
100 INJECTION, SOLUTION SUBCUTANEOUS
Qty: 2 | Refills: 3 | Status: ACTIVE | COMMUNITY
Start: 2022-09-08 | End: 1900-01-01

## 2022-09-09 NOTE — ED ADULT NURSE NOTE - CAS EDP DISCH TYPE
Informed patient.  Patient will leave it as it is for now and call back if she wants to reschedule w/o contrast.    Home

## 2022-09-14 ENCOUNTER — APPOINTMENT (OUTPATIENT)
Dept: VASCULAR SURGERY | Facility: CLINIC | Age: 72
End: 2022-09-14

## 2022-09-14 VITALS
HEIGHT: 68 IN | BODY MASS INDEX: 21.07 KG/M2 | WEIGHT: 139 LBS | SYSTOLIC BLOOD PRESSURE: 138 MMHG | TEMPERATURE: 98.9 F | DIASTOLIC BLOOD PRESSURE: 65 MMHG | HEART RATE: 49 BPM

## 2022-09-14 PROCEDURE — 99024 POSTOP FOLLOW-UP VISIT: CPT

## 2022-09-14 NOTE — REASON FOR VISIT
[de-identified] : RUE radiocephalic fistula creation  [de-identified] : 9/1/22 [de-identified] : Pt. is doing well, denies any hand pain or weakness. \par Incision is well healed.\par Good thrill over AVF. palpable distal radial pulse. \par \par \par A/P: AVF is maturing well, not ready for use yet. \par will reevaluate maturation in 3-4w with duplex\par Pt is not on HD yet

## 2022-09-16 RX ORDER — PEN NEEDLE, DIABETIC 29 G X1/2"
32G X 4 MM NEEDLE, DISPOSABLE MISCELLANEOUS
Qty: 2 | Refills: 3 | Status: ACTIVE | COMMUNITY
Start: 2020-01-13 | End: 1900-01-01

## 2022-09-17 ENCOUNTER — OUTPATIENT (OUTPATIENT)
Dept: OUTPATIENT SERVICES | Facility: HOSPITAL | Age: 72
LOS: 1 days | End: 2022-09-17
Payer: COMMERCIAL

## 2022-09-17 ENCOUNTER — APPOINTMENT (OUTPATIENT)
Dept: RADIOLOGY | Facility: CLINIC | Age: 72
End: 2022-09-17

## 2022-09-17 ENCOUNTER — APPOINTMENT (OUTPATIENT)
Dept: CT IMAGING | Facility: CLINIC | Age: 72
End: 2022-09-17

## 2022-09-17 DIAGNOSIS — Z01.818 ENCOUNTER FOR OTHER PREPROCEDURAL EXAMINATION: ICD-10-CM

## 2022-09-17 PROCEDURE — 71045 X-RAY EXAM CHEST 1 VIEW: CPT | Mod: 26

## 2022-09-17 PROCEDURE — 74176 CT ABD & PELVIS W/O CONTRAST: CPT | Mod: 26

## 2022-09-17 PROCEDURE — 74176 CT ABD & PELVIS W/O CONTRAST: CPT

## 2022-09-17 PROCEDURE — 71045 X-RAY EXAM CHEST 1 VIEW: CPT

## 2022-09-19 ENCOUNTER — APPOINTMENT (OUTPATIENT)
Dept: NEPHROLOGY | Facility: CLINIC | Age: 72
End: 2022-09-19

## 2022-09-19 ENCOUNTER — LABORATORY RESULT (OUTPATIENT)
Age: 72
End: 2022-09-19

## 2022-09-19 VITALS
TEMPERATURE: 98.7 F | BODY MASS INDEX: 21.22 KG/M2 | DIASTOLIC BLOOD PRESSURE: 66 MMHG | HEART RATE: 50 BPM | SYSTOLIC BLOOD PRESSURE: 189 MMHG | HEIGHT: 68 IN | WEIGHT: 140 LBS | OXYGEN SATURATION: 100 %

## 2022-09-19 PROCEDURE — 99215 OFFICE O/P EST HI 40 MIN: CPT

## 2022-09-19 RX ORDER — CALCIUM ACETATE 667 MG/1
667 TABLET ORAL TWICE DAILY
Qty: 120 | Refills: 0 | Status: ACTIVE | COMMUNITY
Start: 2022-09-19 | End: 1900-01-01

## 2022-09-19 RX ORDER — AMLODIPINE BESYLATE 10 MG/1
10 TABLET ORAL DAILY
Qty: 90 | Refills: 3 | Status: DISCONTINUED | COMMUNITY
Start: 2022-02-17 | End: 2022-09-19

## 2022-09-19 RX ORDER — SODIUM BICARBONATE 650 MG/1
650 TABLET ORAL TWICE DAILY
Qty: 120 | Refills: 0 | Status: ACTIVE | COMMUNITY
Start: 2022-09-19

## 2022-09-20 ENCOUNTER — APPOINTMENT (OUTPATIENT)
Dept: CARDIOLOGY | Facility: CLINIC | Age: 72
End: 2022-09-20

## 2022-09-20 ENCOUNTER — NON-APPOINTMENT (OUTPATIENT)
Age: 72
End: 2022-09-20

## 2022-09-20 VITALS
DIASTOLIC BLOOD PRESSURE: 72 MMHG | HEIGHT: 68 IN | BODY MASS INDEX: 21.07 KG/M2 | OXYGEN SATURATION: 99 % | SYSTOLIC BLOOD PRESSURE: 202 MMHG | WEIGHT: 139 LBS | HEART RATE: 46 BPM

## 2022-09-20 PROCEDURE — 99214 OFFICE O/P EST MOD 30 MIN: CPT

## 2022-09-20 PROCEDURE — 93000 ELECTROCARDIOGRAM COMPLETE: CPT | Mod: NC

## 2022-09-20 PROCEDURE — 99072 ADDL SUPL MATRL&STAF TM PHE: CPT

## 2022-09-21 LAB
ALBUMIN SERPL ELPH-MCNC: 4 G/DL
ANION GAP SERPL CALC-SCNC: 16 MMOL/L
BASOPHILS # BLD AUTO: 0.04 K/UL
BASOPHILS NFR BLD AUTO: 0.7 %
BUN SERPL-MCNC: 82 MG/DL
CALCIUM SERPL-MCNC: 8.9 MG/DL
CHLORIDE SERPL-SCNC: 101 MMOL/L
CO2 SERPL-SCNC: 20 MMOL/L
CREAT SERPL-MCNC: 5.02 MG/DL
EGFR: 12 ML/MIN/1.73M2
EOSINOPHIL # BLD AUTO: 0.34 K/UL
EOSINOPHIL NFR BLD AUTO: 5.8 %
FERRITIN SERPL-MCNC: 943 NG/ML
GLUCOSE SERPL-MCNC: 300 MG/DL
HCT VFR BLD CALC: 28.5 %
HGB BLD-MCNC: 8.7 G/DL
IMM GRANULOCYTES NFR BLD AUTO: 0.3 %
IRON SATN MFR SERPL: 45 %
IRON SERPL-MCNC: 99 UG/DL
LYMPHOCYTES # BLD AUTO: 1.16 K/UL
LYMPHOCYTES NFR BLD AUTO: 19.6 %
MAN DIFF?: NORMAL
MCHC RBC-ENTMCNC: 21 PG
MCHC RBC-ENTMCNC: 30.5 GM/DL
MCV RBC AUTO: 68.8 FL
MONOCYTES # BLD AUTO: 0.43 K/UL
MONOCYTES NFR BLD AUTO: 7.3 %
NEUTROPHILS # BLD AUTO: 3.92 K/UL
NEUTROPHILS NFR BLD AUTO: 66.3 %
PHOSPHATE SERPL-MCNC: 4.1 MG/DL
PLATELET # BLD AUTO: 178 K/UL
POTASSIUM SERPL-SCNC: 4.4 MMOL/L
RBC # BLD: 4.14 M/UL
RBC # FLD: 16.8 %
SODIUM SERPL-SCNC: 137 MMOL/L
TIBC SERPL-MCNC: 221 UG/DL
UIBC SERPL-MCNC: 122 UG/DL
WBC # FLD AUTO: 5.91 K/UL

## 2022-09-21 NOTE — PHYSICAL EXAM
[General Appearance - Alert] : alert [General Appearance - In No Acute Distress] : in no acute distress [General Appearance - Well Nourished] : well nourished [Sclera] : the sclera and conjunctiva were normal [Outer Ear] : the ears and nose were normal in appearance [Hearing Threshold Finger Rub Not [place]] : hearing was normal [Neck Appearance] : the appearance of the neck was normal [Respiration, Rhythm And Depth] : normal respiratory rhythm and effort [Exaggerated Use Of Accessory Muscles For Inspiration] : no accessory muscle use [Auscultation Breath Sounds / Voice Sounds] : lungs were clear to auscultation bilaterally [Apical Impulse] : the apical impulse was normal [Heart Rate And Rhythm] : heart rate was normal and rhythm regular [Heart Sounds] : normal S1 and S2 [Edema] : there was no peripheral edema [Veins - Varicosity Changes] : there were no varicosital changes [Bowel Sounds] : normal bowel sounds [Abdomen Tenderness] : non-tender [No CVA Tenderness] : no ~M costovertebral angle tenderness [No Spinal Tenderness] : no spinal tenderness [Abnormal Walk] : normal gait [Musculoskeletal - Swelling] : no joint swelling seen [Skin Color & Pigmentation] : normal skin color and pigmentation [Skin Turgor] : normal skin turgor [] : no rash [Skin Lesions] : no skin lesions [Cranial Nerves] : cranial nerves 2-12 were intact [Sensation] : the sensory exam was normal to light touch and pinprick [No Focal Deficits] : no focal deficits [Impaired Insight] : insight and judgment were intact [Affect] : the affect was normal [Mood] : the mood was normal [FreeTextEntry1] : No asterixis

## 2022-09-21 NOTE — HISTORY OF PRESENT ILLNESS
[FreeTextEntry1] :  Mr. JIMMY BLAND is a 71 year-old man with a PMH of long-standing DM and HTN who presents [facility] for the management of Stage 5 CKD. \par \par \par Kidney history:\par Mr. BLAND has a baseline serum creatinine of 3.3mg/dL, with a corresponding eGFR of 20-22 ml/min/1.73m2.\par He was seeing Dr. Mcwilliams in Nov 21 and Feb 22. Dr. Mcwilliams had attributed the cause of CKD to Diabetes. No biopsy was done. He was also told that he had proteinuria. He has to switch nephrologist because of insurance issues. \par \par He denies having nausea/vomiting/diarrhea. He has a good appetite. He has frothy urine, but denies hematuria, or dysuria. He also has nocturia, but no hesitancy. He denies shortness of breath, chest pain, headache, edema. No hand tremors. He  denies skin rash, joint pains or hair loss. He takes ibuprofen 400mg for headache/ back pain once every 2-3 months. \par No family history of kidney disease.\par \par DM:\par Diagnosed in 2002\par Currently on insulin and pioglitazone. \par \par HTN:\par Diagnosed in 2000s\par Meals: mixed home cooked meal or eat out. [place], fast food. \par \par Nephrolithiasis:no\par \par \par -------------------------------------\par \par Interval history:\par Admitted to the hospital mid Aug-Early Sept 2022 for uncontrolled hypertension. He has an AVF fistula placed during that admission. Serum creatinine around mid 6, and K was 4 upon discharge. \par \par Home BP has been in the 130s-140s/70s.\par Furosemide 40mg BID\par Nifedipine ER 60mg (2 tabs)\par Sodium bicarb 650mg 2 tabs BID\par Calcium 667 2 tabs bid\par Hydralazine 100mg TID. \par \par

## 2022-09-21 NOTE — ASSESSMENT
[FreeTextEntry1] : \par 1. Chronic kidney disease stage 5  (Proteinuria). \par Patient's baseline serum creatinine is 6 mg/dL, with a corresponding eGFR of around 12 ml/min/1.73m2.\par The etiology of CKD is likely secondary to diabetic nephropathy. \par In order to slow progression, patient is advised have good blood pressure and blood glucose control and to avoid NSAIDs. \par >50% of the encounter was spent discussing about kidney function, stages of CKD, and steps to slow progression of kidney disease. \par He has a right forearm AVF. \par No immediate need for dialysis at the moment. Ideally, I would like to wait for his AVF to be mature before we start dialysis. \par \par 2. Hyperkalemia - likely due to CKD. Continue low potassium diet and furosemide. \par \par 3. HTN - Goal BP for patient is  140/80. Clinic BP elevated but home BP is acceptable. \par Continue nifedipine 60mg (2tabs) daily, hydralazine 100mg BID, carvedilol 12.5mg BID and furosemide 40mg BID.\par Low salt diet recommended.\par \par 4. Metabolic acidosis - secondary to CKD. \par Continue sodium bicarb 2 tabs BID. \par \par 5. Secondary hyperparathyroidism\par Continue calcium acetate 667 (2 tabs) with meals. \par \par 6. Medication toxicity monitoring. Medication dose reviewed. Since he is taking furosemide, \par we will monitor for hypokalemia.\par \par Patient is recommended to follow up in 6-8 weeks. \par \par

## 2022-09-23 NOTE — CARDIOLOGY SUMMARY
[de-identified] : 9/20/2022. Marked sinus bradycardia. 45 BPM. Voltage criteria for LVH with strain pattern. [de-identified] : 8/31/2022. LVEF 64%.\par CONCLUSIONS:\par 1. Mitral annular calcification, otherwise normal mitral\par valve. Minimal mitral regurgitation.\par 2. Mildly dilated left atrium.  LA volume index = 41 cc/m2.\par 3. Mild concentric left ventricular hypertrophy.\par 4. Normal left ventricular systolic function. No segmental\par wall motion abnormalities.\par 5. Normal right ventricular size and function.\par

## 2022-09-23 NOTE — DISCUSSION/SUMMARY
[EKG obtained to assist in diagnosis and management of assessed problem(s)] : EKG obtained to assist in diagnosis and management of assessed problem(s) [FreeTextEntry1] : He is a 71 year old [facility] man who presents today for preoperative cardiovascular evaluation prior to possible kidney transplant with known cardiac risk factors as above.\par \par His blood pressure today is 155/69 mm Hg. No changes to his medications were suggested. Continue carvedilol, nifedipine, furosemide and hydralazine.\par \par For a patient with dyslipidemia and radiographic coronary artery calcifications, he will continue atorvastatin nightly for cardiovascular risk reduction. He is not on aspirin therapy secondary to anemia. \par \par For a patient with poorly controlled insulin dependent diabetes mellitus, would recommend follow up for improved glycemic control. \par \par ECG and echocardiogram as above.\par \par A nuclear stress test will evaluate for any evidence of exercise induced ischemia. He understands that if he is unable to walk on the treadmill, conversion to a chemical stress test may be necessary.\par \par Follow up pending results.

## 2022-09-23 NOTE — HISTORY OF PRESENT ILLNESS
[FreeTextEntry1] : Megha Art presents today for preoperative cardiovascular evaluation prior to possible kidney transplant. \par At this time he is a preemptive candidate. He has no potential living donors.\par Mr. Art is a 71 year old [facility] man with known cardiac risk factors of chronic hypertension, dyslipidemia, poorly controlled insulin dependent diabetes mellitus (diagnosed 20 years ago, A1c 9.9%), and chronic kidney disease (not yet on dialysis, creatinine 5.02 mg/dL, eGFR 12). He was recently hospitalized for uncontrolled hypertension and a right radial AV fistula was created during his stay.\par Today he reports feeling well in general, aside from some frustration regarding the number of pills he takes. \par He remains active by walking in his neighborhood and is able to walk for > 10 blocks without any cardiopulmonary limitations. He denies any chest discomfort, shortness of breath, palpitations, lightheadedness or syncope. 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 He is a lifelong nonsmoker with no family history of heart disease. \par \par HIs father is . He had diabetes.\par His mother is . She  from complications of Covid. \par He is  with 5 children, all reported as healthy.

## 2022-09-23 NOTE — END OF VISIT
[FreeTextEntry3] : I saw the patient with Meri Manning NP and I agree with the findings and plan as above.  [Time Spent: ___ minutes] : I have spent [unfilled] minutes of time on the encounter.

## 2022-09-23 NOTE — PHYSICAL EXAM

## 2022-09-26 ENCOUNTER — APPOINTMENT (OUTPATIENT)
Dept: NEPHROLOGY | Facility: CLINIC | Age: 72
End: 2022-09-26

## 2022-09-26 VITALS — TEMPERATURE: 96.4 F | SYSTOLIC BLOOD PRESSURE: 121 MMHG | DIASTOLIC BLOOD PRESSURE: 58 MMHG | HEART RATE: 72 BPM

## 2022-09-26 PROCEDURE — 96372 THER/PROPH/DIAG INJ SC/IM: CPT

## 2022-09-26 RX ORDER — ERYTHROPOIETIN 10000 [IU]/ML
10000 INJECTION, SOLUTION INTRAVENOUS; SUBCUTANEOUS WEEKLY
Qty: 4 | Refills: 0 | Status: DISCONTINUED | OUTPATIENT
Start: 2022-09-26 | End: 2022-09-26

## 2022-09-26 RX ORDER — ERYTHROPOIETIN 4000 [IU]/ML
4000 INJECTION, SOLUTION INTRAVENOUS; SUBCUTANEOUS
Qty: 4 | Refills: 3 | Status: DISCONTINUED | COMMUNITY
Start: 2022-09-20 | End: 2022-09-26

## 2022-09-26 RX ORDER — ERYTHROPOIETIN 20000 [IU]/ML
20000 INJECTION, SOLUTION INTRAVENOUS; SUBCUTANEOUS WEEKLY
Qty: 1 | Refills: 0 | Status: COMPLETED | OUTPATIENT
Start: 2022-09-26 | End: 2022-09-26

## 2022-10-03 ENCOUNTER — APPOINTMENT (OUTPATIENT)
Dept: NEPHROLOGY | Facility: CLINIC | Age: 72
End: 2022-10-03

## 2022-10-03 VITALS
TEMPERATURE: 97.2 F | SYSTOLIC BLOOD PRESSURE: 169 MMHG | OXYGEN SATURATION: 98 % | HEART RATE: 79 BPM | HEIGHT: 68 IN | WEIGHT: 140 LBS | BODY MASS INDEX: 21.22 KG/M2 | DIASTOLIC BLOOD PRESSURE: 65 MMHG

## 2022-10-03 VITALS — SYSTOLIC BLOOD PRESSURE: 103 MMHG | DIASTOLIC BLOOD PRESSURE: 48 MMHG

## 2022-10-03 PROCEDURE — 96372 THER/PROPH/DIAG INJ SC/IM: CPT

## 2022-10-03 RX ORDER — NIFEDIPINE 60 MG/1
60 TABLET, EXTENDED RELEASE ORAL DAILY
Qty: 180 | Refills: 3 | Status: ACTIVE | COMMUNITY
Start: 2022-09-19 | End: 1900-01-01

## 2022-10-03 RX ORDER — ERYTHROPOIETIN 20000 [IU]/ML
20000 INJECTION, SOLUTION INTRAVENOUS; SUBCUTANEOUS
Qty: 1 | Refills: 0 | Status: COMPLETED | OUTPATIENT
Start: 2022-10-03

## 2022-10-10 ENCOUNTER — LABORATORY RESULT (OUTPATIENT)
Age: 72
End: 2022-10-10

## 2022-10-10 ENCOUNTER — APPOINTMENT (OUTPATIENT)
Dept: NEPHROLOGY | Facility: CLINIC | Age: 72
End: 2022-10-10

## 2022-10-10 VITALS
WEIGHT: 144.18 LBS | BODY MASS INDEX: 21.85 KG/M2 | TEMPERATURE: 99.4 F | SYSTOLIC BLOOD PRESSURE: 188 MMHG | HEART RATE: 78 BPM | HEIGHT: 68 IN | DIASTOLIC BLOOD PRESSURE: 70 MMHG | OXYGEN SATURATION: 99 %

## 2022-10-10 VITALS — DIASTOLIC BLOOD PRESSURE: 70 MMHG | SYSTOLIC BLOOD PRESSURE: 180 MMHG | TEMPERATURE: 98.3 F

## 2022-10-10 PROCEDURE — 99213 OFFICE O/P EST LOW 20 MIN: CPT

## 2022-10-11 LAB
BASOPHILS # BLD AUTO: 0.1 K/UL
BASOPHILS NFR BLD AUTO: 0.9 %
EOSINOPHIL # BLD AUTO: 0.19 K/UL
EOSINOPHIL NFR BLD AUTO: 1.7 %
HCT VFR BLD CALC: 26.7 %
HGB BLD-MCNC: 8.4 G/DL
LYMPHOCYTES # BLD AUTO: 0.75 K/UL
LYMPHOCYTES NFR BLD AUTO: 6.9 %
MAN DIFF?: NORMAL
MCHC RBC-ENTMCNC: 21.3 PG
MCHC RBC-ENTMCNC: 31.5 GM/DL
MCV RBC AUTO: 67.8 FL
MONOCYTES # BLD AUTO: 0.38 K/UL
MONOCYTES NFR BLD AUTO: 3.5 %
NEUTROPHILS # BLD AUTO: 9.52 K/UL
NEUTROPHILS NFR BLD AUTO: 87 %
PLATELET # BLD AUTO: 183 K/UL
RBC # BLD: 3.94 M/UL
RBC # FLD: 18.7 %
WBC # FLD AUTO: 10.94 K/UL

## 2022-10-12 ENCOUNTER — APPOINTMENT (OUTPATIENT)
Dept: VASCULAR SURGERY | Facility: CLINIC | Age: 72
End: 2022-10-12

## 2022-10-14 ENCOUNTER — APPOINTMENT (OUTPATIENT)
Dept: GASTROENTEROLOGY | Facility: CLINIC | Age: 72
End: 2022-10-14

## 2022-10-14 VITALS
HEIGHT: 68 IN | HEART RATE: 51 BPM | DIASTOLIC BLOOD PRESSURE: 62 MMHG | SYSTOLIC BLOOD PRESSURE: 160 MMHG | WEIGHT: 143 LBS | BODY MASS INDEX: 21.67 KG/M2 | RESPIRATION RATE: 15 BRPM | TEMPERATURE: 97.8 F | OXYGEN SATURATION: 98 %

## 2022-10-14 DIAGNOSIS — Z86.79 PERSONAL HISTORY OF OTHER DISEASES OF THE CIRCULATORY SYSTEM: ICD-10-CM

## 2022-10-14 DIAGNOSIS — Z78.9 OTHER SPECIFIED HEALTH STATUS: ICD-10-CM

## 2022-10-14 DIAGNOSIS — Z86.39 PERSONAL HISTORY OF OTHER ENDOCRINE, NUTRITIONAL AND METABOLIC DISEASE: ICD-10-CM

## 2022-10-14 DIAGNOSIS — N18.9 CHRONIC KIDNEY DISEASE, UNSPECIFIED: ICD-10-CM

## 2022-10-14 PROCEDURE — 99072 ADDL SUPL MATRL&STAF TM PHE: CPT

## 2022-10-14 PROCEDURE — 99204 OFFICE O/P NEW MOD 45 MIN: CPT

## 2022-10-14 RX ORDER — POLYETHYLENE GLYCOL 3350 AND ELECTROLYTES WITH LEMON FLAVOR 236; 22.74; 6.74; 5.86; 2.97 G/4L; G/4L; G/4L; G/4L; G/4L
236 POWDER, FOR SOLUTION ORAL
Qty: 1 | Refills: 0 | Status: ACTIVE | COMMUNITY
Start: 2022-10-14 | End: 1900-01-01

## 2022-10-15 PROBLEM — N18.9 CRF (CHRONIC RENAL FAILURE): Status: RESOLVED | Noted: 2022-10-15 | Resolved: 2022-10-15

## 2022-10-15 PROBLEM — Z78.9 NON-SMOKER: Status: ACTIVE | Noted: 2022-10-15

## 2022-10-15 PROBLEM — Z78.9 NON-SMOKER: Status: RESOLVED | Noted: 2020-01-13 | Resolved: 2022-10-15

## 2022-10-15 PROBLEM — Z86.79 HISTORY OF ESSENTIAL HYPERTENSION: Status: RESOLVED | Noted: 2022-10-15 | Resolved: 2022-10-15

## 2022-10-15 PROBLEM — Z86.39 HISTORY OF DIABETES MELLITUS: Status: RESOLVED | Noted: 2022-10-15 | Resolved: 2022-10-15

## 2022-10-15 NOTE — HISTORY OF PRESENT ILLNESS
[FreeTextEntry1] : He is an asymptomatic 71 year old male referred for a Pre Kidney transplant evaluation of his colon.  His last colonoscopy  was 5 years  ago which was normal. He denies a family history of colon cancer.  He is not on dialysis

## 2022-10-15 NOTE — CONSULT LETTER
[Dear  ___] : Dear  [unfilled], [Consult Letter:] : I had the pleasure of evaluating your patient, [unfilled]. [( Thank you for referring [unfilled] for consultation for _____ )] : Thank you for referring [unfilled] for consultation for [unfilled] [Please see my note below.] : Please see my note below. [Consult Closing:] : Thank you very much for allowing me to participate in the care of this patient.  If you have any questions, please do not hesitate to contact me. [FreeTextEntry3] : Reji Sommer MD\par \par Gastroenterology\par [facility]\par [facility]\par \par  [Sincerely,] : Sincerely,

## 2022-10-17 ENCOUNTER — APPOINTMENT (OUTPATIENT)
Dept: CARDIOLOGY | Facility: CLINIC | Age: 72
End: 2022-10-17

## 2022-10-17 ENCOUNTER — EMERGENCY (EMERGENCY)
Facility: HOSPITAL | Age: 72
LOS: 1 days | Discharge: HOME | End: 2022-10-17
Attending: EMERGENCY MEDICINE | Admitting: EMERGENCY MEDICINE

## 2022-10-17 VITALS
HEART RATE: 55 BPM | SYSTOLIC BLOOD PRESSURE: 191 MMHG | OXYGEN SATURATION: 100 % | RESPIRATION RATE: 16 BRPM | DIASTOLIC BLOOD PRESSURE: 70 MMHG | TEMPERATURE: 98 F

## 2022-10-17 VITALS
RESPIRATION RATE: 18 BRPM | DIASTOLIC BLOOD PRESSURE: 72 MMHG | HEART RATE: 59 BPM | SYSTOLIC BLOOD PRESSURE: 218 MMHG | TEMPERATURE: 98 F | OXYGEN SATURATION: 100 % | HEIGHT: 68 IN

## 2022-10-17 LAB
ALBUMIN SERPL ELPH-MCNC: 3.5 G/DL — SIGNIFICANT CHANGE UP (ref 3.3–5)
ALP SERPL-CCNC: 67 U/L — SIGNIFICANT CHANGE UP (ref 40–120)
ALT FLD-CCNC: 9 U/L — SIGNIFICANT CHANGE UP (ref 4–41)
ANION GAP SERPL CALC-SCNC: 15 MMOL/L — HIGH (ref 7–14)
APPEARANCE UR: CLEAR — SIGNIFICANT CHANGE UP
AST SERPL-CCNC: 19 U/L — SIGNIFICANT CHANGE UP (ref 4–40)
BACTERIA # UR AUTO: NEGATIVE — SIGNIFICANT CHANGE UP
BASOPHILS # BLD AUTO: 0.06 K/UL — SIGNIFICANT CHANGE UP (ref 0–0.2)
BASOPHILS NFR BLD AUTO: 0.9 % — SIGNIFICANT CHANGE UP (ref 0–2)
BILIRUB SERPL-MCNC: 0.4 MG/DL — SIGNIFICANT CHANGE UP (ref 0.2–1.2)
BILIRUB UR-MCNC: NEGATIVE — SIGNIFICANT CHANGE UP
BLOOD GAS VENOUS COMPREHENSIVE RESULT: SIGNIFICANT CHANGE UP
BUN SERPL-MCNC: 69 MG/DL — HIGH (ref 7–23)
CALCIUM SERPL-MCNC: 8.6 MG/DL — SIGNIFICANT CHANGE UP (ref 8.4–10.5)
CHLORIDE SERPL-SCNC: 107 MMOL/L — SIGNIFICANT CHANGE UP (ref 98–107)
CO2 SERPL-SCNC: 17 MMOL/L — LOW (ref 22–31)
COLOR SPEC: SIGNIFICANT CHANGE UP
CREAT SERPL-MCNC: 4.7 MG/DL — HIGH (ref 0.5–1.3)
DIFF PNL FLD: NEGATIVE — SIGNIFICANT CHANGE UP
EGFR: 13 ML/MIN/1.73M2 — LOW
EOSINOPHIL # BLD AUTO: 0.71 K/UL — HIGH (ref 0–0.5)
EOSINOPHIL NFR BLD AUTO: 11.1 % — HIGH (ref 0–6)
EPI CELLS # UR: 1 /HPF — SIGNIFICANT CHANGE UP (ref 0–5)
FLUAV AG NPH QL: SIGNIFICANT CHANGE UP
FLUBV AG NPH QL: SIGNIFICANT CHANGE UP
GLUCOSE SERPL-MCNC: 149 MG/DL — HIGH (ref 70–99)
GLUCOSE UR QL: ABNORMAL
HCT VFR BLD CALC: 27.5 % — LOW (ref 39–50)
HGB BLD-MCNC: 8.4 G/DL — LOW (ref 13–17)
HYALINE CASTS # UR AUTO: 1 /LPF — SIGNIFICANT CHANGE UP (ref 0–7)
IANC: 4.27 K/UL — SIGNIFICANT CHANGE UP (ref 1.8–7.4)
KETONES UR-MCNC: NEGATIVE — SIGNIFICANT CHANGE UP
LEUKOCYTE ESTERASE UR-ACNC: NEGATIVE — SIGNIFICANT CHANGE UP
LYMPHOCYTES # BLD AUTO: 0.6 K/UL — LOW (ref 1–3.3)
LYMPHOCYTES # BLD AUTO: 9.4 % — LOW (ref 13–44)
MCHC RBC-ENTMCNC: 20.4 PG — LOW (ref 27–34)
MCHC RBC-ENTMCNC: 30.5 GM/DL — LOW (ref 32–36)
MCV RBC AUTO: 66.9 FL — LOW (ref 80–100)
MONOCYTES # BLD AUTO: 0.33 K/UL — SIGNIFICANT CHANGE UP (ref 0–0.9)
MONOCYTES NFR BLD AUTO: 5.1 % — SIGNIFICANT CHANGE UP (ref 2–14)
NEUTROPHILS # BLD AUTO: 4.58 K/UL — SIGNIFICANT CHANGE UP (ref 1.8–7.4)
NEUTROPHILS NFR BLD AUTO: 71.8 % — SIGNIFICANT CHANGE UP (ref 43–77)
NITRITE UR-MCNC: NEGATIVE — SIGNIFICANT CHANGE UP
PH UR: 6.5 — SIGNIFICANT CHANGE UP (ref 5–8)
PLATELET # BLD AUTO: 193 K/UL — SIGNIFICANT CHANGE UP (ref 150–400)
POTASSIUM SERPL-MCNC: 5.8 MMOL/L — HIGH (ref 3.5–5.3)
POTASSIUM SERPL-SCNC: 5.8 MMOL/L — HIGH (ref 3.5–5.3)
PROT SERPL-MCNC: 6.7 G/DL — SIGNIFICANT CHANGE UP (ref 6–8.3)
PROT UR-MCNC: ABNORMAL
RBC # BLD: 4.11 M/UL — LOW (ref 4.2–5.8)
RBC # FLD: 18.2 % — HIGH (ref 10.3–14.5)
RBC CASTS # UR COMP ASSIST: 6 /HPF — HIGH (ref 0–4)
RSV RNA NPH QL NAA+NON-PROBE: SIGNIFICANT CHANGE UP
SARS-COV-2 RNA SPEC QL NAA+PROBE: SIGNIFICANT CHANGE UP
SODIUM SERPL-SCNC: 139 MMOL/L — SIGNIFICANT CHANGE UP (ref 135–145)
SP GR SPEC: 1.01 — SIGNIFICANT CHANGE UP (ref 1.01–1.05)
UROBILINOGEN FLD QL: SIGNIFICANT CHANGE UP
WBC # BLD: 6.38 K/UL — SIGNIFICANT CHANGE UP (ref 3.8–10.5)
WBC # FLD AUTO: 6.38 K/UL — SIGNIFICANT CHANGE UP (ref 3.8–10.5)
WBC UR QL: 2 /HPF — SIGNIFICANT CHANGE UP (ref 0–5)

## 2022-10-17 PROCEDURE — 99284 EMERGENCY DEPT VISIT MOD MDM: CPT

## 2022-10-17 RX ORDER — FUROSEMIDE 40 MG
40 TABLET ORAL ONCE
Refills: 0 | Status: COMPLETED | OUTPATIENT
Start: 2022-10-17 | End: 2022-10-17

## 2022-10-17 RX ORDER — CARVEDILOL PHOSPHATE 80 MG/1
12.5 CAPSULE, EXTENDED RELEASE ORAL ONCE
Refills: 0 | Status: COMPLETED | OUTPATIENT
Start: 2022-10-17 | End: 2022-10-17

## 2022-10-17 RX ORDER — HYDRALAZINE HCL 50 MG
50 TABLET ORAL ONCE
Refills: 0 | Status: COMPLETED | OUTPATIENT
Start: 2022-10-17 | End: 2022-10-17

## 2022-10-17 RX ORDER — NIFEDIPINE 30 MG
60 TABLET, EXTENDED RELEASE 24 HR ORAL ONCE
Refills: 0 | Status: COMPLETED | OUTPATIENT
Start: 2022-10-17 | End: 2022-10-17

## 2022-10-17 RX ADMIN — Medication 50 MILLIGRAM(S): at 10:15

## 2022-10-17 RX ADMIN — CARVEDILOL PHOSPHATE 12.5 MILLIGRAM(S): 80 CAPSULE, EXTENDED RELEASE ORAL at 10:15

## 2022-10-17 RX ADMIN — Medication 40 MILLIGRAM(S): at 10:15

## 2022-10-17 RX ADMIN — Medication 60 MILLIGRAM(S): at 10:15

## 2022-10-17 NOTE — ED PROVIDER NOTE - PATIENT PORTAL LINK FT
You can access the FollowMyHealth Patient Portal offered by [facility] by registering at the following website: [email or web address]. By joining [facility]’s FollowMyHealth portal, you will also be able to view your health information using other applications (apps) compatible with our system.

## 2022-10-17 NOTE — PHYSICAL EXAM
[General Appearance - Alert] : alert [General Appearance - In No Acute Distress] : in no acute distress [General Appearance - Well Nourished] : well nourished [Sclera] : the sclera and conjunctiva were normal [Outer Ear] : the ears and nose were normal in appearance [Hearing Threshold Finger Rub Not [place]] : hearing was normal [Neck Appearance] : the appearance of the neck was normal [Respiration, Rhythm And Depth] : normal respiratory rhythm and effort [Veins - Varicosity Changes] : there were no varicosital changes [Edema] : there was no peripheral edema [Abdomen Soft] : soft [Abdomen Tenderness] : non-tender [Skin Color & Pigmentation] : normal skin color and pigmentation [] : no rash [Skin Lesions] : no skin lesions [No Focal Deficits] : no focal deficits [Cranial Nerves] : cranial nerves 2-12 were intact [Impaired Insight] : insight and judgment were intact [Affect] : the affect was normal [Mood] : the mood was normal

## 2022-10-17 NOTE — ED PROVIDER NOTE - PROGRESS NOTE DETAILS
Mychal Moreno DO (PGY1)  Reached out to patient's nephrologist regarding further recs regarding patient's VBG. Patient bicarb at 18. Patient takes sodium bicarb at home x2/day Mychal Moreno DO (PGY1)  Spoke with nephrologist. Discussed patient's drop in Creatinine. States patient is ok to be d/c'd with f/u appt on Monday. Spoke with patient regarding disposition. Spoke with patient regarding necessary of compliance with BP medication. Discussed increase in fluid hydration and to take loperamide OTC if diarrhea happens again. States he went to the bathroom in hospital with no diarrhea.

## 2022-10-17 NOTE — ED PROVIDER NOTE - OBJECTIVE STATEMENT
71 y M, hx of CKD5 not on dialysis, DM, HTN, currently on renal transplant list presenting today for 3 days of intermittent diarrhea. Patient states >3 episodes per day. Patient also states he has not taken his BP medications since Saturday (Nifedipine) because it makes him dizzy. Denies nausea, vomiting, blood in stool, fever, chills, lower exremity swelling, chest pain, shortness of breath, abdominal pain, dizziness, headache. States that he was supposed to go for a cardiac stress test today but was told if patient develops any GI symptoms to go to the ER as per nephrologist Dr. Amrita Minor. Patient not currently on HD. Patient accompanied by wife. Patient had right AV fistula placed 09/01. 71 y M, hx of CKD5 not on dialysis, DM, HTN, currently on renal transplant list presenting today for 3 days of intermittent diarrhea. Patient states >3 episodes per day. Patient also states he has not taken his BP medications since Saturday (Nifedipine) because it makes him dizzy. Denies nausea, vomiting, blood in stool, fever, chills, lower extremity swelling, chest pain, shortness of breath, abdominal pain, dizziness, headache. States that he was supposed to go for a cardiac stress test today but was told if patient develops any GI symptoms to go to the ER as per nephrologist Dr. Amrita Minor. Patient not currently on HD. Patient accompanied by wife. Patient had right AV fistula placed 09/01. As per wife, patient has been non compliant with insulin regimen. Patient is on a strict renal diet with no salt/sugar.

## 2022-10-17 NOTE — ED ADULT NURSE NOTE - NS_SISCREENINGSR_GEN_ALL_ED
Problem: Activity/Exercise Intolerance  Goal: Patient will tolerate activity/exercise    Intervention: Progress activity per Cardiac Rehab protocol  Intervention Status  Done  Intervention: Progressive graded ambulation  Intervention Status  Done  Intervention: Collaborate with nursing to ensure ambulation 4-6 times per day  Intervention Status  Done  Intervention: Upper and lower extremity calisthenics per Cardiac Rehab protocol  Intervention Status  Done  Intervention: Monitor and document progressive activity response  Intervention Status  Done  Intervention: Encourage hourly incentive spirometry, cough and deep breathe  Intervention Status  Done         Negative

## 2022-10-17 NOTE — ED PROVIDER NOTE - NS ED ROS FT
CONSTITUTIONAL: No fevers, no chills  EYES: no visual changes, no eye pain  EARS: no ear drainage, no ear pain, no change in hearing  NOSE: no nasal congestion  MOUTH/THROAT: no sore throat  CV: No chest pain, no palpitations  RESP: No SOB, no cough  GI: +DIARRHEA   : no dysuria, no hematuria, no flank pain  MSK: no back pain, no extremity pain  SKIN: no rashes

## 2022-10-17 NOTE — ED PROVIDER NOTE - NSFOLLOWUPINSTRUCTIONS_ED_ALL_ED_FT
CONTINUE TAKING YOUR BLOOD PRESSURE MEDICATIONS AS PRESCRIBED.    Follow up with Dr Minor on monday as scheduled.    Diarrhea    Diarrhea is frequent loose or watery bowel movements that has many causes. Diarrhea can make you feel weak and cause you to become dehydrated. Diarrhea typically lasts 2–3 days, but can last longer if it is a sign of something more serious. Drink clear fluids to prevent dehydration. Eat bland, easy-to-digest foods as tolerated.     You may trial an over the counter medication called loperamide 4mg twice daily (for no more than 2 days) if you have more than 3 episodes of diarrhea.    SEEK IMMEDIATE MEDICAL CARE IF YOU HAVE ANY OF THE FOLLOWING SYMPTOMS: high fevers, lightheadedness/dizziness, chest pain, black or bloody stools, shortness of breath, severe abdominal or back pain, or any signs of dehydration.

## 2022-10-17 NOTE — ASSESSMENT
[FreeTextEntry1] : \par 1. Chronic kidney disease stage 5  (Proteinuria). \par Patient's baseline serum creatinine is 6 mg/dL, with a corresponding eGFR of around 12 ml/min/1.73m2.\par The etiology of CKD is likely secondary to diabetic nephropathy. \par In order to slow progression, patient is advised have good blood pressure and blood glucose control and to avoid NSAIDs. \par >50% of the encounter was spent discussing about kidney function, stages of CKD, and steps to slow progression of kidney disease. \par He has a right forearm AVF. \par No immediate need for dialysis at the moment. Ideally, I would like to wait for his AVF to be mature before we start dialysis. \par \par 2. Hyperkalemia - likely due to CKD. Continue low potassium diet and furosemide. \par \par 3. HTN - Goal BP for patient is  140/80. Clinic BP elevated but home BP is acceptable. \par Continue nifedipine 60mg (2tabs) daily, hydralazine 100mg BID, carvedilol 12.5mg BID and furosemide 40mg BID.\par Low salt diet recommended.\par \par 4. Metabolic acidosis - secondary to CKD. \par Continue sodium bicarb 2 tabs BID. \par \par 5. Anemia\par Hb low. FOr Procrit next week if BP better.\par \par 5. Secondary hyperparathyroidism\par Continue calcium acetate 667 (2 tabs) with meals. \par \par 6. Medication toxicity monitoring. Medication dose reviewed. Since he is taking furosemide, \par we will monitor for hypokalemia.\par \par Patient is recommended to follow up in 6-8 weeks. \par \par

## 2022-10-17 NOTE — ED PROVIDER NOTE - CLINICAL SUMMARY MEDICAL DECISION MAKING FREE TEXT BOX
71 y M, hx of CKD5 not on dialysis, DM, HTN, currently on renal transplant list presenting today for 3 days of intermittent diarrhea    Patient with nonfocal abdominal exam. Patient BP elevated - non compliant on home meds. Plan to give home meds for elevated BP.  Plan for basic labs, blood gas, GI PCR   DDx includes viral gastroenteritis vs. diverticulitis 71 y M, hx of CKD5 not on dialysis, DM, HTN, currently on renal transplant list presenting today for 3 days of intermittent diarrhea    Patient with nonfocal abdominal exam. Patient BP elevated - non compliant on home meds. Plan to give home meds for elevated BP.  Plan for basic labs, blood gas, GI PCR, urinalysis, flu with COVID  DDx includes viral gastroenteritis vs. diverticulitis vs. COVID. R/o need for urgent HD.

## 2022-10-17 NOTE — ED PROVIDER NOTE - PHYSICAL EXAMINATION
Physical Exam:    Gen: NAD, AOx3  Head: NCAT  HEENT: EOMI, PEERLA, normal conjunctiva, tongue midline, oral mucosa moist  Lung: CTAB, no respiratory distress, no wheezes/rhonchi/rales B/L, speaking in full sentences  CV: RRR, no murmurs, rubs or gallops  Abd: soft, NT, ND, no guarding, no rigidity, no rebound tenderness, no CVA tenderness   MSK: no visible deformities, ROM normal in UE/LE, no back pain  Neuro: No focal sensory or motor deficits  Skin: Warm, well perfused, no rash, no leg swelling

## 2022-10-17 NOTE — HISTORY OF PRESENT ILLNESS
[FreeTextEntry1] : SBP was elevated at 180s. He did not take nifedipine last night. He says that he has been having face flushing. \par \par Plan - defer procrit injection today. \par \par Patient is advised to take nifedipine 60mg BID instead of 120mg at bedtime to see if side effects improved. \par Come back next week. \par Repeat CBC today.

## 2022-10-17 NOTE — ED PROVIDER NOTE - ATTENDING CONTRIBUTION TO CARE
The patient is a 71 year old Male who has a past medical and surgical history of DM, HTN, CKD5 awaiting renal transplant who PTED today at the request of his pvt nephologist TBE for diarrhea as describd Patient looks very well and is here b/c he was told to come in. Of note he has not taken his BP medications since Saturday (Nifedipine) because it makes him dizzy but SBP of 218 noted in triage  VSS  PE as documented   IMP: hypovolemia from diarrhea dizziness from same; exacerbated by nifedipine use previously now possible 2/2 HTN   Plan  labs calcium phosphorus magnesium in case of hypo hyperkalemia   BP control with patients regular meds first IV if necessitated by end organ damage findings   reassess   dispo as per results and response

## 2022-10-17 NOTE — ED ADULT TRIAGE NOTE - CHIEF COMPLAINT QUOTE
Pt c/o diarrhea since yesterday and states he has not taken his BP meds since yesterday bc it makes him dizzy. Pt has LAV fistula, not presently on dialysis. PMH HTN DM

## 2022-10-17 NOTE — ED ADULT NURSE NOTE - OBJECTIVE STATEMENT
Patient is a 71-year-old male, A&OX3, ambulatory at baseline, Phx of CKD, DM, HTN, on renal transplant list c/o X 2-3 days of diarrhea. Pt says "my DrEdwar said if I have diarrhea I have to come to the emergency room". Pt also reports was scheduled for a stress test today but was unable to go because of feeling unwell. Pt did not take blood pressure medications this morning and BP noted to be >200's systolically. Denies and headaches, numbness, tingling. Neuro/sensory intact. Says BP meds make him dizzy. Had R AVF placed in September. Not on dialysis currently. On renal diet, not eating sugar/salt. 20 G IV placed in L AC. Labs drawn and sent. Safety maintained. Will continue to monitor.

## 2022-10-24 ENCOUNTER — APPOINTMENT (OUTPATIENT)
Dept: NEPHROLOGY | Facility: CLINIC | Age: 72
End: 2022-10-24

## 2022-10-24 VITALS
HEART RATE: 49 BPM | DIASTOLIC BLOOD PRESSURE: 71 MMHG | WEIGHT: 132.28 LBS | TEMPERATURE: 97.6 F | OXYGEN SATURATION: 100 % | BODY MASS INDEX: 20.05 KG/M2 | HEIGHT: 68 IN | SYSTOLIC BLOOD PRESSURE: 166 MMHG

## 2022-10-24 VITALS — DIASTOLIC BLOOD PRESSURE: 55 MMHG | SYSTOLIC BLOOD PRESSURE: 145 MMHG | HEART RATE: 84 BPM

## 2022-10-24 PROCEDURE — 96372 THER/PROPH/DIAG INJ SC/IM: CPT

## 2022-10-24 NOTE — HISTORY OF PRESENT ILLNESS
[FreeTextEntry1] : Mr. JIMMY BLAND is a 71 year-old man with a PMH of long-standing DM and HTN who presents [facility] for the management of Stage 5 CKD. \par \par \par Kidney history:\par Mr. BLAND has a baseline serum creatinine of 3.3mg/dL, with a corresponding eGFR of 20-22 ml/min/1.73m2.\par He was seeing Dr. Mcwilliams in Nov 21 and Feb 22. Dr. Mcwilliams had attributed the cause of CKD to Diabetes. No biopsy was done. He was also told that he had proteinuria. He has to switch nephrologist because of insurance issues. \par \par He denies having nausea/vomiting/diarrhea. He has a good appetite. He has frothy urine, but denies hematuria, or dysuria. He also has nocturia, but no hesitancy. He denies shortness of breath, chest pain, headache, edema. No hand tremors. He denies skin rash, joint pains or hair loss. He takes ibuprofen 400mg for headache/ back pain once every 2-3 months. \par No family history of kidney disease.\par \par DM:\par Diagnosed in 2002\par Currently on insulin and pioglitazone. \par \par HTN:\par Diagnosed in 2000s\par Meals: mixed home cooked meal or eat out. [place], fast food. \par \par Nephrolithiasis:no\par \par \par -------------------------------------\par \par Interval history:\par Recent ER visit after having diarrhea for 2 days. Serum creatinine was stable. He is not taking his medication consistently because he is having many side effects. Sometimes he gets flushing, sometimes he gets dizzy.  He also feels full after taking all the pills. He is very frustrated. \par No nausea/ vomiting. \par

## 2022-10-24 NOTE — PHYSICAL EXAM
[General Appearance - Alert] : alert [General Appearance - In No Acute Distress] : in no acute distress [General Appearance - Well Nourished] : well nourished [Sclera] : the sclera and conjunctiva were normal [Outer Ear] : the ears and nose were normal in appearance [Hearing Threshold Finger Rub Not [place]] : hearing was normal [Neck Appearance] : the appearance of the neck was normal [Respiration, Rhythm And Depth] : normal respiratory rhythm and effort [Apical Impulse] : the apical impulse was normal [Heart Rate And Rhythm] : heart rate was normal and rhythm regular [Heart Sounds Pericardial Friction Rub] : no pericardial rub [Edema] : there was no peripheral edema [Veins - Varicosity Changes] : there were no varicosital changes [Abdomen Soft] : soft [Abdomen Tenderness] : non-tender [No CVA Tenderness] : no ~M costovertebral angle tenderness [No Spinal Tenderness] : no spinal tenderness [Abnormal Walk] : normal gait [Musculoskeletal - Swelling] : no joint swelling seen [Skin Color & Pigmentation] : normal skin color and pigmentation [] : no rash [Skin Lesions] : no skin lesions [Cranial Nerves] : cranial nerves 2-12 were intact [No Focal Deficits] : no focal deficits [Impaired Insight] : insight and judgment were intact [Affect] : the affect was normal [Mood] : the mood was normal

## 2022-10-24 NOTE — REASON FOR VISIT
[Procedure: _________] : a [unfilled] procedure visit [Follow-Up] : a follow-up visit [FreeTextEntry1] : Follow up and Procrit shot

## 2022-10-24 NOTE — ASSESSMENT
[FreeTextEntry1] : pt here for Procrit due to low hgb 8.4 10/10/22.  Pt accompanied by spouse.  provided 7000 units Procrit sq to left upper arm without event.  BP elevated upon arrival however /55 before Procrit given.  Pt to return next week for another Procrit injection.  Pt to monitor his symptoms from his medications and the time he took his medications and bring this to next visit as instructed.  Pt's spouse will assist with this request.  Pt has c/o lightheaded from meds in past however is unsure of which medication is causing the lightheadedness.

## 2022-10-24 NOTE — PHYSICAL EXAM
[General Appearance - Alert] : alert [General Appearance - In No Acute Distress] : in no acute distress [General Appearance - Well Nourished] : well nourished [Sclera] : the sclera and conjunctiva were normal [Outer Ear] : the ears and nose were normal in appearance [Hearing Threshold Finger Rub Not [place]] : hearing was normal [Neck Appearance] : the appearance of the neck was normal [Respiration, Rhythm And Depth] : normal respiratory rhythm and effort [Apical Impulse] : the apical impulse was normal [Heart Rate And Rhythm] : heart rate was normal and rhythm regular [Heart Sounds Pericardial Friction Rub] : no pericardial rub [Edema] : there was no peripheral edema [Veins - Varicosity Changes] : there were no varicosital changes [Abdomen Soft] : soft [Abdomen Tenderness] : non-tender [No CVA Tenderness] : no ~M costovertebral angle tenderness [No Spinal Tenderness] : no spinal tenderness [Abnormal Walk] : normal gait [Musculoskeletal - Swelling] : no joint swelling seen [Skin Color & Pigmentation] : normal skin color and pigmentation [] : no rash [Skin Lesions] : no skin lesions [Cranial Nerves] : cranial nerves 2-12 were intact [No Focal Deficits] : no focal deficits [Impaired Insight] : insight and judgment were intact [Affect] : the affect was normal [Mood] : the mood was normal

## 2022-10-24 NOTE — HISTORY OF PRESENT ILLNESS
[FreeTextEntry1] : Mr. JIMMY BLAND is a 71 year-old man with a PMH of long-standing DM and HTN who presents to [facility] for the management of Stage 5 CKD. \par \par \par Kidney history:\par Mr. BLAND has a baseline serum creatinine of 3.3mg/dL, with a corresponding eGFR of 20-22 ml/min/1.73m2.\par He was seeing Dr. Mcwilliams in Nov 21 and Feb 22. Dr. Mcwilliams had attributed the cause of CKD to Diabetes. No biopsy was done. He was also told that he had proteinuria. He has to switch nephrologist because of insurance issues. \par \par He denies having nausea/vomiting/diarrhea. He has a good appetite. He has frothy urine, but denies hematuria, or dysuria. He also has nocturia, but no hesitancy. He denies shortness of breath, chest pain, headache, edema. No hand tremors. He denies skin rash, joint pains or hair loss. He takes ibuprofen 400mg for headache/ back pain once every 2-3 months. \par No family history of kidney disease.\par \par DM:\par Diagnosed in 2002\par Currently on insulin and pioglitazone. \par \par HTN:\par Diagnosed in 2000s\par Meals: mixed home cooked meal or eat out. [place], fast food. \par \par Nephrolithiasis:no\par \par \par -------------------------------------\par \par Interval history:\par Recent ER visit after having diarrhea for 2 days. Serum creatinine was stable. He is not taking his medication consistently because he is having many side effects. Sometimes he gets flushing, sometimes he gets dizzy.  He also feels full after taking all the pills. He is very frustrated. \par No nausea/ vomiting. \par

## 2022-10-31 ENCOUNTER — APPOINTMENT (OUTPATIENT)
Dept: NEPHROLOGY | Facility: CLINIC | Age: 72
End: 2022-10-31

## 2022-10-31 ENCOUNTER — APPOINTMENT (OUTPATIENT)
Dept: CARDIOLOGY | Facility: CLINIC | Age: 72
End: 2022-10-31

## 2022-10-31 VITALS
WEIGHT: 139.99 LBS | SYSTOLIC BLOOD PRESSURE: 168 MMHG | HEIGHT: 68 IN | HEART RATE: 63 BPM | BODY MASS INDEX: 21.22 KG/M2 | OXYGEN SATURATION: 100 % | DIASTOLIC BLOOD PRESSURE: 64 MMHG | TEMPERATURE: 98.3 F

## 2022-10-31 PROCEDURE — ZZZZZ: CPT

## 2022-10-31 RX ADMIN — ERYTHROPOIETIN 7000 UNIT/ML: 20000 INJECTION, SOLUTION INTRAVENOUS; SUBCUTANEOUS at 00:00

## 2022-11-07 ENCOUNTER — APPOINTMENT (OUTPATIENT)
Dept: NEPHROLOGY | Facility: CLINIC | Age: 72
End: 2022-11-07

## 2022-11-07 ENCOUNTER — LABORATORY RESULT (OUTPATIENT)
Age: 72
End: 2022-11-07

## 2022-11-07 VITALS
WEIGHT: 135 LBS | BODY MASS INDEX: 20.46 KG/M2 | HEART RATE: 82 BPM | SYSTOLIC BLOOD PRESSURE: 171 MMHG | HEIGHT: 68 IN | TEMPERATURE: 98 F | DIASTOLIC BLOOD PRESSURE: 76 MMHG | OXYGEN SATURATION: 97 %

## 2022-11-07 VITALS — DIASTOLIC BLOOD PRESSURE: 78 MMHG | SYSTOLIC BLOOD PRESSURE: 182 MMHG | HEART RATE: 75 BPM

## 2022-11-07 DIAGNOSIS — Z51.81 ENCOUNTER FOR THERAPEUTIC DRUG LVL MONITORING: ICD-10-CM

## 2022-11-07 PROCEDURE — 99215 OFFICE O/P EST HI 40 MIN: CPT

## 2022-11-07 NOTE — ASSESSMENT
[FreeTextEntry1] : \par 1. Chronic kidney disease stage 5  (Proteinuria). \par Patient's baseline serum creatinine is 5-6 mg/dL, with a corresponding eGFR of around 12 ml/min/1.73m2.\par The etiology of CKD is likely secondary to diabetic nephropathy. \par He has a right forearm AVF. He is schduled to see Dr. Lockett on nov 23, 2022. \par His seems to have early symptoms of uremia. I will repeat BMP today. If BUN and creatinine are worsening, I will  proceed to transitioning him to dialysis over the next 2-3 weeks. \par \par 2. Hyperkalemia - likely due to CKD. Continue low potassium diet. Repeat BMP today. \par \par 3. HTN - Goal BP for patient is  140/80. Clinic BP has been elevated, but per patient's wife, home BP has been better controlled. \par He was prescribed nifedipine 60mg (1 tab BID), hydralazine 100mg TID, carvedilol 12.5mg BID and furosemide 40mg BID.\par But he felt that all the medications are making him feel bad. \par For now, he is taking hydralazine 100mg TID. \par TO continue checking BP at home. \par \par 4. Metabolic acidosis - secondary to CKD. \par Patient is only taking sodium bicarbonate 1tab TID. Continue the current dose for now. \par \par 5. Anemia\par Hb low. We have planned for SILVA, but he has not received them for the past 3 weeks due to hypertension. We will hold off further SILVA for now. \par \par 5. Secondary hyperparathyroidism\par Continue calcium acetate 667 (2 tabs) with meals. \par \par 6. Medication toxicity monitoring. Medication dose reviewed. Since he is taking furosemide, \par we will monitor for hypokalemia.\par \par Patient is recommended to follow up in 6-8 weeks. \par \par

## 2022-11-07 NOTE — PHYSICAL EXAM
[General Appearance - Alert] : alert [General Appearance - In No Acute Distress] : in no acute distress [General Appearance - Well Nourished] : well nourished [Sclera] : the sclera and conjunctiva were normal [Outer Ear] : the ears and nose were normal in appearance [Hearing Threshold Finger Rub Not [place]] : hearing was normal [Neck Appearance] : the appearance of the neck was normal [Respiration, Rhythm And Depth] : normal respiratory rhythm and effort [Apical Impulse] : the apical impulse was normal [Heart Rate And Rhythm] : heart rate was normal and rhythm regular [Heart Sounds Pericardial Friction Rub] : no pericardial rub [Edema] : there was no peripheral edema [Veins - Varicosity Changes] : there were no varicosital changes [Abdomen Soft] : soft [Abdomen Tenderness] : non-tender [No CVA Tenderness] : no ~M costovertebral angle tenderness [No Spinal Tenderness] : no spinal tenderness [Abnormal Walk] : normal gait [Musculoskeletal - Swelling] : no joint swelling seen [Skin Color & Pigmentation] : normal skin color and pigmentation [] : no rash [Skin Lesions] : no skin lesions [Cranial Nerves] : cranial nerves 2-12 were intact [No Focal Deficits] : no focal deficits [Impaired Insight] : insight and judgment were intact [Affect] : the affect was normal [Mood] : the mood was normal

## 2022-11-07 NOTE — HISTORY OF PRESENT ILLNESS
[FreeTextEntry1] : Mr. JIMMY BLAND is a 71 year-old man with a PMH of long-standing DM and HTN who presents to [facility] for the management of Stage 5 CKD. \par \par \par Kidney history:\par Mr. BLAND has a baseline serum creatinine of 3.3mg/dL, with a corresponding eGFR of 20-22 ml/min/1.73m2.\par He was seeing Dr. Mcwilliams in Nov 21 and Feb 22. Dr. Mcwilliams had attributed the cause of CKD to Diabetes. No biopsy was done. He was also told that he had proteinuria. He has to switch nephrologist because of insurance issues. \par \par He denies having nausea/vomiting/diarrhea. He has a good appetite. He has frothy urine, but denies hematuria, or dysuria. He also has nocturia, but no hesitancy. He denies shortness of breath, chest pain, headache, edema. No hand tremors. He denies skin rash, joint pains or hair loss. He takes ibuprofen 400mg for headache/ back pain once every 2-3 months. \par No family history of kidney disease.\par \par DM:\par Diagnosed in 2002\par Currently on insulin and pioglitazone. \par \par HTN:\par Diagnosed in 2000s\par Meals: mixed home cooked meal or eat out. [place], fast food. \par \par Nephrolithiasis:no\par \par \par -------------------------------------\par \par Interval history:\par \par He came today initially for ESAinjection. But his BP is too elevated (170s - 180s/90s).\par Per patient's wife, his BP has been in the 130s-140s/80s at home.\par Currently, he is taking hydralazine 100mg TID, but he is not taking nifedipine/ carvedilol.\par He has a poor appetite, all food tastes bad too him, and he has been losing ~7 lb over the past few weeks.\par \par No chest pain, no tremors, no nausea/vomiting, no edema.

## 2022-11-09 LAB
ALBUMIN SERPL ELPH-MCNC: 3.6 G/DL
ANION GAP SERPL CALC-SCNC: 19 MMOL/L
BASOPHILS # BLD AUTO: 0.04 K/UL
BASOPHILS NFR BLD AUTO: 0.6 %
BUN SERPL-MCNC: 63 MG/DL
CALCIUM SERPL-MCNC: 9 MG/DL
CALCIUM SERPL-MCNC: 9 MG/DL
CHLORIDE SERPL-SCNC: 106 MMOL/L
CO2 SERPL-SCNC: 12 MMOL/L
CREAT SERPL-MCNC: 6.32 MG/DL
EGFR: 9 ML/MIN/1.73M2
EOSINOPHIL # BLD AUTO: 0.36 K/UL
EOSINOPHIL NFR BLD AUTO: 5.6 %
GLUCOSE SERPL-MCNC: 242 MG/DL
HBV CORE IGM SER QL: NONREACTIVE
HBV SURFACE AB SER QL: NONREACTIVE
HBV SURFACE AG SER QL: NONREACTIVE
HCT VFR BLD CALC: 31.9 %
HCV AB SER QL: NONREACTIVE
HCV S/CO RATIO: 0.07 S/CO
HGB BLD-MCNC: 9.6 G/DL
IMM GRANULOCYTES NFR BLD AUTO: 0.3 %
LYMPHOCYTES # BLD AUTO: 0.9 K/UL
LYMPHOCYTES NFR BLD AUTO: 14 %
M TB IFN-G BLD-IMP: NEGATIVE
MAN DIFF?: NORMAL
MCHC RBC-ENTMCNC: 20.5 PG
MCHC RBC-ENTMCNC: 30.1 GM/DL
MCV RBC AUTO: 68.2 FL
MONOCYTES # BLD AUTO: 0.39 K/UL
MONOCYTES NFR BLD AUTO: 6.1 %
NEUTROPHILS # BLD AUTO: 4.7 K/UL
NEUTROPHILS NFR BLD AUTO: 73.4 %
PARATHYROID HORMONE INTACT: 87 PG/ML
PHOSPHATE SERPL-MCNC: 5.2 MG/DL
PLATELET # BLD AUTO: 164 K/UL
POTASSIUM SERPL-SCNC: 5.2 MMOL/L
QUANTIFERON TB PLUS MITOGEN MINUS NIL: 2.32 IU/ML
QUANTIFERON TB PLUS NIL: 0.03 IU/ML
QUANTIFERON TB PLUS TB1 MINUS NIL: -0.01 IU/ML
QUANTIFERON TB PLUS TB2 MINUS NIL: 0 IU/ML
RBC # BLD: 4.68 M/UL
RBC # FLD: 19.6 %
SODIUM SERPL-SCNC: 137 MMOL/L
WBC # FLD AUTO: 6.41 K/UL

## 2022-11-11 ENCOUNTER — EMERGENCY (EMERGENCY)
Facility: HOSPITAL | Age: 72
LOS: 1 days | Discharge: HOME | End: 2022-11-11
Attending: EMERGENCY MEDICINE | Admitting: EMERGENCY MEDICINE

## 2022-11-11 ENCOUNTER — APPOINTMENT (OUTPATIENT)
Dept: VASCULAR SURGERY | Facility: CLINIC | Age: 72
End: 2022-11-11

## 2022-11-11 VITALS
WEIGHT: 135 LBS | HEART RATE: 89 BPM | HEIGHT: 68 IN | SYSTOLIC BLOOD PRESSURE: 241 MMHG | DIASTOLIC BLOOD PRESSURE: 90 MMHG | TEMPERATURE: 97.3 F | BODY MASS INDEX: 20.46 KG/M2

## 2022-11-11 VITALS
OXYGEN SATURATION: 98 % | DIASTOLIC BLOOD PRESSURE: 61 MMHG | HEART RATE: 65 BPM | SYSTOLIC BLOOD PRESSURE: 176 MMHG | RESPIRATION RATE: 20 BRPM

## 2022-11-11 VITALS
OXYGEN SATURATION: 100 % | HEART RATE: 61 BPM | TEMPERATURE: 98 F | SYSTOLIC BLOOD PRESSURE: 243 MMHG | RESPIRATION RATE: 16 BRPM | HEIGHT: 68 IN | DIASTOLIC BLOOD PRESSURE: 87 MMHG

## 2022-11-11 LAB
ALBUMIN SERPL ELPH-MCNC: 3.4 G/DL — SIGNIFICANT CHANGE UP (ref 3.3–5)
ALP SERPL-CCNC: 85 U/L — SIGNIFICANT CHANGE UP (ref 40–120)
ALT FLD-CCNC: 9 U/L — SIGNIFICANT CHANGE UP (ref 4–41)
ANION GAP SERPL CALC-SCNC: 12 MMOL/L — SIGNIFICANT CHANGE UP (ref 7–14)
ANION GAP SERPL CALC-SCNC: 15 MMOL/L — HIGH (ref 7–14)
AST SERPL-CCNC: 30 U/L — SIGNIFICANT CHANGE UP (ref 4–40)
BASOPHILS # BLD AUTO: 0.04 K/UL — SIGNIFICANT CHANGE UP (ref 0–0.2)
BASOPHILS NFR BLD AUTO: 0.6 % — SIGNIFICANT CHANGE UP (ref 0–2)
BILIRUB SERPL-MCNC: 0.4 MG/DL — SIGNIFICANT CHANGE UP (ref 0.2–1.2)
BUN SERPL-MCNC: 53 MG/DL — HIGH (ref 7–23)
BUN SERPL-MCNC: 61 MG/DL — HIGH (ref 7–23)
CALCIUM SERPL-MCNC: 7 MG/DL — LOW (ref 8.4–10.5)
CALCIUM SERPL-MCNC: 8.9 MG/DL — SIGNIFICANT CHANGE UP (ref 8.4–10.5)
CHLORIDE SERPL-SCNC: 106 MMOL/L — SIGNIFICANT CHANGE UP (ref 98–107)
CHLORIDE SERPL-SCNC: 115 MMOL/L — HIGH (ref 98–107)
CO2 SERPL-SCNC: 13 MMOL/L — LOW (ref 22–31)
CO2 SERPL-SCNC: 15 MMOL/L — LOW (ref 22–31)
CREAT SERPL-MCNC: 5.08 MG/DL — HIGH (ref 0.5–1.3)
CREAT SERPL-MCNC: 6.23 MG/DL — HIGH (ref 0.5–1.3)
EGFR: 12 ML/MIN/1.73M2 — LOW
EGFR: 9 ML/MIN/1.73M2 — LOW
EOSINOPHIL # BLD AUTO: 0.27 K/UL — SIGNIFICANT CHANGE UP (ref 0–0.5)
EOSINOPHIL NFR BLD AUTO: 4 % — SIGNIFICANT CHANGE UP (ref 0–6)
GLUCOSE SERPL-MCNC: 132 MG/DL — HIGH (ref 70–99)
GLUCOSE SERPL-MCNC: 148 MG/DL — HIGH (ref 70–99)
HCT VFR BLD CALC: 30 % — LOW (ref 39–50)
HGB BLD-MCNC: 8.9 G/DL — LOW (ref 13–17)
IANC: 5.15 K/UL — SIGNIFICANT CHANGE UP (ref 1.8–7.4)
IMM GRANULOCYTES NFR BLD AUTO: 0.1 % — SIGNIFICANT CHANGE UP (ref 0–0.9)
LYMPHOCYTES # BLD AUTO: 0.96 K/UL — LOW (ref 1–3.3)
LYMPHOCYTES # BLD AUTO: 14.2 % — SIGNIFICANT CHANGE UP (ref 13–44)
MCHC RBC-ENTMCNC: 19.9 PG — LOW (ref 27–34)
MCHC RBC-ENTMCNC: 29.7 GM/DL — LOW (ref 32–36)
MCV RBC AUTO: 67.1 FL — LOW (ref 80–100)
MONOCYTES # BLD AUTO: 0.32 K/UL — SIGNIFICANT CHANGE UP (ref 0–0.9)
MONOCYTES NFR BLD AUTO: 4.7 % — SIGNIFICANT CHANGE UP (ref 2–14)
NEUTROPHILS # BLD AUTO: 5.15 K/UL — SIGNIFICANT CHANGE UP (ref 1.8–7.4)
NEUTROPHILS NFR BLD AUTO: 76.4 % — SIGNIFICANT CHANGE UP (ref 43–77)
NRBC # BLD: 0 /100 WBCS — SIGNIFICANT CHANGE UP (ref 0–0)
NRBC # FLD: 0 K/UL — SIGNIFICANT CHANGE UP (ref 0–0)
PLATELET # BLD AUTO: 230 K/UL — SIGNIFICANT CHANGE UP (ref 150–400)
POTASSIUM SERPL-MCNC: 4.4 MMOL/L — SIGNIFICANT CHANGE UP (ref 3.5–5.3)
POTASSIUM SERPL-MCNC: 6.3 MMOL/L — CRITICAL HIGH (ref 3.5–5.3)
POTASSIUM SERPL-SCNC: 4.4 MMOL/L — SIGNIFICANT CHANGE UP (ref 3.5–5.3)
POTASSIUM SERPL-SCNC: 6.3 MMOL/L — CRITICAL HIGH (ref 3.5–5.3)
PROT SERPL-MCNC: 6.9 G/DL — SIGNIFICANT CHANGE UP (ref 6–8.3)
RBC # BLD: 4.47 M/UL — SIGNIFICANT CHANGE UP (ref 4.2–5.8)
RBC # FLD: 18.6 % — HIGH (ref 10.3–14.5)
SODIUM SERPL-SCNC: 136 MMOL/L — SIGNIFICANT CHANGE UP (ref 135–145)
SODIUM SERPL-SCNC: 140 MMOL/L — SIGNIFICANT CHANGE UP (ref 135–145)
TROPONIN T, HIGH SENSITIVITY RESULT: 100 NG/L — CRITICAL HIGH
WBC # BLD: 6.75 K/UL — SIGNIFICANT CHANGE UP (ref 3.8–10.5)
WBC # FLD AUTO: 6.75 K/UL — SIGNIFICANT CHANGE UP (ref 3.8–10.5)

## 2022-11-11 PROCEDURE — 93010 ELECTROCARDIOGRAM REPORT: CPT

## 2022-11-11 PROCEDURE — 99024 POSTOP FOLLOW-UP VISIT: CPT

## 2022-11-11 PROCEDURE — 99283 EMERGENCY DEPT VISIT LOW MDM: CPT | Mod: GC

## 2022-11-11 PROCEDURE — 93990 DOPPLER FLOW TESTING: CPT

## 2022-11-11 PROCEDURE — 99285 EMERGENCY DEPT VISIT HI MDM: CPT

## 2022-11-11 RX ORDER — CARVEDILOL PHOSPHATE 80 MG/1
12.5 CAPSULE, EXTENDED RELEASE ORAL ONCE
Refills: 0 | Status: COMPLETED | OUTPATIENT
Start: 2022-11-11 | End: 2022-11-11

## 2022-11-11 RX ORDER — HYDRALAZINE HCL 50 MG
100 TABLET ORAL ONCE
Refills: 0 | Status: COMPLETED | OUTPATIENT
Start: 2022-11-11 | End: 2022-11-11

## 2022-11-11 RX ORDER — NIFEDIPINE 30 MG
120 TABLET, EXTENDED RELEASE 24 HR ORAL ONCE
Refills: 0 | Status: COMPLETED | OUTPATIENT
Start: 2022-11-11 | End: 2022-11-11

## 2022-11-11 RX ADMIN — Medication 120 MILLIGRAM(S): at 11:32

## 2022-11-11 RX ADMIN — Medication 100 MILLIGRAM(S): at 18:12

## 2022-11-11 RX ADMIN — CARVEDILOL PHOSPHATE 12.5 MILLIGRAM(S): 80 CAPSULE, EXTENDED RELEASE ORAL at 11:32

## 2022-11-11 RX ADMIN — Medication 100 MILLIGRAM(S): at 11:32

## 2022-11-11 NOTE — ASSESSMENT
[FreeTextEntry1] : Impression - Chronic kidney disease, not on dialysis yet\par \par Right arm NANCY ultrasound\par ; veins < 6mm\par \par Plan\par Called EMS. Sent pt to [facility] ED to get his blood pressure under control\par Explained the importance of taking his antihypertensives as prescribed. \par Explained to the pt and his wife that the avf is not mature and will need a procedure\par Indications, risks, and benefits of balloon assisted maturation procedure reviewed. Pt and pt's wife agree and plan to proceed\par Schedule right arm fistulogram, balloon assisted maturation [Medication Management] : medication management

## 2022-11-11 NOTE — CONSULT NOTE ADULT - ASSESSMENT
72 y/o M with hx of CKD5 not on dialysis, DM, HTN, currently on renal transplant list presenting from vascular surgeon's office due to elevated blood pressures found to be in hypertensive urgency. Cardiology consulted for elevated troponins.    1. Hypertensive Urgency  2. Elevated troponins - nonischemic EKG troponins 127 -> 100 no active CP likely due to advanced CKD TTE 8/22 LVEF 64% with LVH      RECOMMENDATIONS:  - No need to trend further troponins  - Resume antihypertensives would gradually lower blood pressures ~25% over the first couple of hours with eventual goal of SBP < 160 in the 24-48 hours  - Management of CKD as per primary team        Note not final until signed by attending       Landon Curtis MD  Cardiology Fellow PGY-5  Phone: [phone]    For all New Consults  [email or web address]   Login: [place]

## 2022-11-11 NOTE — ED PROVIDER NOTE - OBJECTIVE STATEMENT
71yM with history of DM2, hypertension, CKD stage V, (right arm AV fistula placed on 09/01 has not begun dialysis) sent to ED from vascular surgery office for elevated blood pressure. Patient states that he is not compliant with his blood pressure medication as he does not feel well after he takes them. Patient denies headache, dizziness, blurry vision, chest pain, palpitations, shortness of breath, abdominal pain, nausea, vomiting, diarrhea, recent travel or illness or any other concerns.  Per prior discharge within 1 month, home meds for HTN: Carvedilol 12.5mg BID, Hydralazine 100mg TID, Nifedipine 120mg daily

## 2022-11-11 NOTE — REASON FOR VISIT
[Post Op: _________] : a [unfilled] post op visit [Spouse] : spouse [FreeTextEntry1] : right radiocephalic fistula

## 2022-11-11 NOTE — ED PROVIDER NOTE - CLINICAL SUMMARY MEDICAL DECISION MAKING FREE TEXT BOX
71yM with history of DM2, hypertension, CKD stage V, (right arm AV fistula placed on 09/01 has not begun dialysis) sent to ED from vascular surgery office for elevated blood pressure. Patient states that he is not compliant with his blood pressure medication as he does not feel well after he takes them. Currently asymptomatic. Pt is well appearing, hypertensive /80, non focal neuro exam, no active complaints. Per prior discharge within 1 month, home meds for HTN: Carvedilol 12.5mg BID, Hydralazine 100mg TID, Nifedipine 120mg daily. Plan: cbc/cmp, trop, EKG. Will give home BP meds and reassess.

## 2022-11-11 NOTE — CONSULT NOTE ADULT - ATTENDING COMMENTS
Megha Art is a 71-year-old man with history of CKD-5 (not on dialysis), DM, HTN, currently on renal transplant list presenting from vascular surgeon's office due hypertensive urgency, complicated by elevated HS Trop T. There were no acute ischemic ST-segment changes on ECG and HS Trop T 127 -> 100 ng/dL in setting of CKD, with no CP. TTE 8/22 LVEF 64% with LVH. Management includes no further ischemia assessment currently. Resume antihypertensive medication with aim to gradually lower blood pressure to eventual goal of SBP < 160 mm Hg within the next 24-48 hours

## 2022-11-11 NOTE — ED ADULT NURSE NOTE - CHIEF COMPLAINT QUOTE
Pt sent  from Doctors office with high BP.  Pt  with newly placed dialysis.  Denies chest, sob, dizziness, blurry vision, nausea.  Pt non compliant with BP meds x 1 week

## 2022-11-11 NOTE — ED ADULT NURSE NOTE - OBJECTIVE STATEMENT
Patient is a 70 yo male, h/x DM2, CKD 5, HTN, presenting with elevated BP from doctor's office. AAOx4, no signs of distress, patient reports he has not taken his BP meds for the past week because they make him feel "sick" and not able to eat or do anything. Denies chest pain, shortness of breath, headache, blurry vision, numbness, tingling, weakness. Patient with RUE AVF, has not started HD yet. Placed on cardiac monitor, NSR, /85, other VSS. Fall precautions maintained.

## 2022-11-11 NOTE — ED PROVIDER NOTE - PATIENT PORTAL LINK FT
You can access the FollowMyHealth Patient Portal offered by [facility] by registering at the following website: [email or web address]. By joining [facility]’s FollowMyHealth portal, you will also be able to view your health information using other applications (apps) compatible with our system.

## 2022-11-11 NOTE — ED PROVIDER NOTE - PROGRESS NOTE DETAILS
ROBBIN Noel: Spoke with cards will see pt given delta trop. ROBBIN Noel: pt seen by ashley, spoke with fellow, no intervention at this time. Pts BP improved and he continues to feel well. Will d/c home with PMD follow up, discussed with pt at length he needs to take his blood pressure medication as prescribed.

## 2022-11-11 NOTE — CONSULT NOTE ADULT - SUBJECTIVE AND OBJECTIVE BOX
HPI:    72 y/o M with hx of CKD5 not on dialysis, DM, HTN, currently on renal transplant list presenting from vascular surgeon's office due to elevated blood pressures. Patient reports he does not take his BP meds because when he ambulates he feels dizzy and tired. Has not checked his blood pressures at home. In the hospital when he receives his medications he reports feeling well. Denies CP/SOB, palpitations, orthopnea or PND. Patient is still able to make urine. Never has had a stress test. Not a smoker no FH of cardiac disease. Of note patient was recently discharged in September for hypertensive urgency and hyperkalemia.     PMH:   Diabetes    Stage 5 chronic kidney disease not on chronic dialysis      PSH:   No significant past surgical history      Medications:     Allergies:  No Known Allergies    FAMILY HISTORY:  FHx: diabetes mellitus (Father, Aunt)      Social History:  Smoking: denies  Alcohol: denies  Drugs: denies    Review of Systems:  Constitutional: [ ] Fever [ ] Chills [ ] Fatigue [ ] Weight change   HEENT: [ ] Blurred vision [ ] Eye Pain [ ] Headache [ ] Runny nose [ ] Sore Throat   Respiratory: [ ] Cough [ ] Wheezing [ ] Shortness of breath  Cardiovascular: [ ] Chest Pain [ ] Palpitations [ ] PANG [ ] PND [ ] Orthopnea  Gastrointestinal: [ ] Abdominal Pain [ ] Diarrhea [ ] Constipation [ ] Hemorrhoids [ ] Nausea [ ] Vomiting  Genitourinary: [ ] Nocturia [ ] Dysuria [ ] Incontinence  Extremities: [ ] Swelling [ ] Joint Pain  Neurologic: [ ] Focal deficit [ ] Paresthesias [ ] Syncope  Lymphatic: [ ] Swelling [ ] Lymphadenopathy   Skin: [ ] Rash [ ] Ecchymoses [ ] Wounds [ ] Lesions  Psychiatry: [ ] Depression [ ] Suicidal/Homicidal Ideation [ ] Anxiety [ ] Sleep Disturbances  [X ] 10 point review of systems is otherwise negative except as mentioned above            [ ]Unable to obtain    Physical Exam:  T(C): 37 (11-11-22 @ 15:58), Max: 37 (11-11-22 @ 15:58)  HR: 64 (11-11-22 @ 15:58) (60 - 67)  BP: 185/67 (11-11-22 @ 15:58) (155/69 - 254/85)  RR: 20 (11-11-22 @ 15:58) (14 - 20)  SpO2: 100% (11-11-22 @ 15:58) (100% - 100%)  Wt(kg): --    Daily Height in cm: 172.72 (11 Nov 2022 10:21)    Daily     Appearance: NAD  Eyes: PERRL, EOMI  HENT: Normal oral mucosa, NC/AT  Cardiovascular: normal S1 and S2, RRR, no m/r/g, no edema, normal JVP  Procedural Access Site:  No hematoma, non-tender to palpation, 2+ pulses distally, no bruit, no ecchymosis  Respiratory: Clear to auscultation bilaterally  Gastrointestinal: Soft, non-tender, non-distended, BS+  Musculoskeletal: No clubbing, no joint deformity   Neurologic: Non-focal  Lymphatic: No lymphadenopathy  Psychiatry: AAOx3, mood & affect appropriate  Skin: No rashes, no ecchymoses, no cyanosis    Cardiovascular Diagnostic Testing:  ECG:      Labs:                        8.9    6.75  )-----------( 230      ( 11 Nov 2022 11:43 )             30.0     11-11    140  |  115<H>  |  53<H>  ----------------------------<  148<H>  4.4   |  13<L>  |  5.08<H>    Ca    7.0<L>      11 Nov 2022 13:50    TPro  6.9  /  Alb  3.4  /  TBili  0.4  /  DBili  x   /  AST  30  /  ALT  9   /  AlkPhos  85  11-11

## 2022-11-11 NOTE — PHYSICAL EXAM
[JVD] : no jugular venous distention  [Normal Breath Sounds] : Normal breath sounds [2+] : left 2+ [Stool Sample Taken] : No stool obtained  on rectal exam [No Rash or Lesion] : No rash or lesion [Alert] : alert [Oriented to Person] : oriented to person [Oriented to Place] : oriented to place [Oriented to Time] : oriented to time [Calm] : calm [de-identified] : NAD [de-identified] : RAGHAVENDRAL [de-identified] : HIGH BLOOD PRESSURE 242/94 LEFT ARM [FreeTextEntry1] : right arm incision well healed, clean, dry and intact\par weak thrill\par right hand warm and well perfused, capillary refill < 3 sec [de-identified] : RAGHAVENDRAL [de-identified] : cooperative

## 2022-11-11 NOTE — ED ADULT NURSE NOTE - NS ED NURSE RECORD ANOTHER VITAL SIGN
Yes, record another set of vital signs [Joint Pain] : joint pain [Joint Stiffness] : joint stiffness [Negative] : Heme/Lymph

## 2022-11-11 NOTE — HISTORY OF PRESENT ILLNESS
[FreeTextEntry1] : Mr. Art, 71 year old male, presents to the office with his wife for post-op visit s/p right radiocephalic fistula on 9/1/22. Incision site is well healed, clean, dry and intact. Pt denies any fever, chills, pain, or drainage. Pt is not on dialysis.\par \par Pt's BP was 241/90 in the office today. I rechecked his BP at the end of the visit and it was 242/94. Pt does not take his blood pressure medications as prescribed. The last time he took his antihypertensives was on Monday 11/7/22. He states that his blood pressure medications make him feel sick and dizzy. Pt denies any headaches, vision changes, shortness of breath or chest pain. EMS was called and I sent the patient to [facility] ED.

## 2022-11-11 NOTE — DATA REVIEWED
[FreeTextEntry1] : 11/11/22 Right arm dialysis ultrasound\par Patent radial-ceph AVF\par \par veins < 6 mm

## 2022-11-11 NOTE — ED PROVIDER NOTE - ATTENDING APP SHARED VISIT CONTRIBUTION OF CARE
Dr. Carter: 71-year-old male with type 2 diabetes, hypertension, CKD stage V with a right arm AV fistula in place but not on dialysis yet, sent to the emergency department from his vascular surgeon's office due to hypertension.  Patient knows that he has hypertension, has been prescribed multiple medications to treat his hypertension, but he has not been taking them because he does not like the way that he feels when he takes them.  Patient states he has spoken to his doctors about this, but he has not been satisfied with the adjustments that they have made, and so he has not been taking the medications.  Patient denies all symptoms currently.  No fever, chest pain, SOB, N/V/D, abdominal pain, focal weakness or other complaints.  On exam pt overall well appearing, in NAD, heart RRR, lungs CTAB, abd NTND, extremities without swelling, strength 5/5 in all extremities and skin without rash.  Right UE with AV fistula in place, good thrill.

## 2022-11-11 NOTE — ED ADULT TRIAGE NOTE - CHIEF COMPLAINT QUOTE
Pt sent  from Doctors office with high BP.  Pt  with newly placed dialysis.  Denies chest, sob, dizziness, blurry vision, nausea Pt sent  from Doctors office with high BP.  Pt  with newly placed dialysis.  Denies chest, sob, dizziness, blurry vision, nausea.  Pt non compliant with BP meds x 1 week

## 2022-11-11 NOTE — ED PROVIDER NOTE - NSFOLLOWUPINSTRUCTIONS_ED_ALL_ED_FT
Follow up with your primary care doctor within 1 week  TAKE MEDICATIONS AS PRESCRIBED TO YOU  Return to the ER with any worsening or concerning symptoms, chest pain, shortness of breath, headache, blurry vision, feeling faint or any other concerns.

## 2022-11-22 ENCOUNTER — APPOINTMENT (OUTPATIENT)
Dept: CARDIOLOGY | Facility: CLINIC | Age: 72
End: 2022-11-22
Payer: MEDICARE

## 2022-11-22 PROCEDURE — A9500: CPT

## 2022-11-22 PROCEDURE — 93015 CV STRESS TEST SUPVJ I&R: CPT

## 2022-11-22 PROCEDURE — 78452 HT MUSCLE IMAGE SPECT MULT: CPT

## 2022-11-23 ENCOUNTER — APPOINTMENT (OUTPATIENT)
Dept: VASCULAR SURGERY | Facility: CLINIC | Age: 72
End: 2022-11-23

## 2022-11-28 DIAGNOSIS — Z01.818 ENCOUNTER FOR OTHER PREPROCEDURAL EXAMINATION: ICD-10-CM

## 2022-11-29 ENCOUNTER — APPOINTMENT (OUTPATIENT)
Dept: VASCULAR SURGERY | Facility: HOSPITAL | Age: 72
End: 2022-11-29

## 2022-11-29 ENCOUNTER — OUTPATIENT (OUTPATIENT)
Dept: OUTPATIENT SERVICES | Facility: HOSPITAL | Age: 72
LOS: 1 days | Discharge: HOME | End: 2022-11-29

## 2022-11-29 DIAGNOSIS — N18.6 END STAGE RENAL DISEASE: ICD-10-CM

## 2022-11-29 PROCEDURE — 99152 MOD SED SAME PHYS/QHP 5/>YRS: CPT

## 2022-11-29 PROCEDURE — 0715T: CPT

## 2022-11-29 PROCEDURE — 76937 US GUIDE VASCULAR ACCESS: CPT | Mod: 26

## 2022-11-29 PROCEDURE — 36902 INTRO CATH DIALYSIS CIRCUIT: CPT | Mod: 58

## 2022-11-29 RX ORDER — HYDRALAZINE HCL 50 MG
10 TABLET ORAL ONCE
Refills: 0 | Status: COMPLETED | OUTPATIENT
Start: 2022-11-29 | End: 2022-11-29

## 2022-11-29 RX ORDER — SODIUM CHLORIDE 9 MG/ML
3 INJECTION INTRAMUSCULAR; INTRAVENOUS; SUBCUTANEOUS EVERY 8 HOURS
Refills: 0 | Status: DISCONTINUED | OUTPATIENT
Start: 2022-11-29 | End: 2022-12-13

## 2022-11-29 RX ORDER — HYDRALAZINE HCL 50 MG
100 TABLET ORAL ONCE
Refills: 0 | Status: COMPLETED | OUTPATIENT
Start: 2022-11-29 | End: 2022-11-29

## 2022-11-29 RX ADMIN — Medication 100 MILLIGRAM(S): at 07:23

## 2022-11-29 RX ADMIN — Medication 10 MILLIGRAM(S): at 07:23

## 2022-11-29 NOTE — H&P CARDIOLOGY - COMMENTS
's on admit, did not take meds for 1-2 weeks; gave home dose Hydralazine 100mg po x1 and Hydralazine 10mg IV x1

## 2022-11-29 NOTE — H&P CARDIOLOGY - HISTORY OF PRESENT ILLNESS
72 year old male, non-compliant with medications with HTN, DM type 2, stage 5 CKD with AVF in place (not on HD as of yet) who presents for right AVF fistulogram with possible angioplasty.  Patient with a history of medication non-compliance, recently referred to [facility] ED after outpatient vascular office visit with /94 and not taking his meds.  Treated in ED and discharged.  Patient continues to choose to be non-compliant with meds, last take 1 week ago per patient but wife says actually 2 weeks ago. I educated patient on need for strict daily compliance with prescribed medications to protect his future health.  He continues to explain only reasons he does not take his medications, " they make me tired, don't feel good with them, they don't help me". I educated patient on the importance of each medication he takes and his current situation eventually being ESRD on HD and not taking meds have a risk of leading him to but not limited to MI/CVA and death. Patient just looks away with no eye contact and no interest in conversation.   Patient without complaints.     please see hard copy H&P in paper chart 11/11/22  PATIENT SEEN AND EXAMINED AND NO NEW CLINICAL CHANGES SINCE office visit

## 2022-12-02 RX ORDER — POLYETHYLENE GLYCOL 3350 AND ELECTROLYTES WITH LEMON FLAVOR 236; 22.74; 6.74; 5.86; 2.97 G/4L; G/4L; G/4L; G/4L; G/4L
236 POWDER, FOR SOLUTION ORAL
Qty: 1 | Refills: 0 | Status: ACTIVE | COMMUNITY
Start: 2022-12-02 | End: 1900-01-01

## 2022-12-04 RX ORDER — POLYETHYLENE GLYCOL 3350 AND ELECTROLYTES WITH LEMON FLAVOR 236; 22.74; 6.74; 5.86; 2.97 G/4L; G/4L; G/4L; G/4L; G/4L
236 POWDER, FOR SOLUTION ORAL
Qty: 1 | Refills: 0 | Status: ACTIVE | COMMUNITY
Start: 2022-12-04 | End: 1900-01-01

## 2022-12-06 ENCOUNTER — APPOINTMENT (OUTPATIENT)
Dept: GASTROENTEROLOGY | Facility: AMBULATORY SURGERY CENTER | Age: 72
End: 2022-12-06

## 2022-12-06 ENCOUNTER — NON-APPOINTMENT (OUTPATIENT)
Age: 72
End: 2022-12-06

## 2022-12-06 RX ORDER — POLYETHYLENE GLYCOL 3350 AND ELECTROLYTES WITH LEMON FLAVOR 236; 22.74; 6.74; 5.86; 2.97 G/4L; G/4L; G/4L; G/4L; G/4L
236 POWDER, FOR SOLUTION ORAL
Qty: 1 | Refills: 0 | Status: ACTIVE | COMMUNITY
Start: 2022-12-06 | End: 1900-01-01

## 2022-12-12 ENCOUNTER — INPATIENT (INPATIENT)
Facility: HOSPITAL | Age: 72
LOS: 1 days | Discharge: HOME | End: 2022-12-14
Attending: INTERNAL MEDICINE | Admitting: INTERNAL MEDICINE

## 2022-12-12 VITALS
TEMPERATURE: 99 F | SYSTOLIC BLOOD PRESSURE: 162 MMHG | RESPIRATION RATE: 18 BRPM | DIASTOLIC BLOOD PRESSURE: 60 MMHG | HEIGHT: 68 IN | OXYGEN SATURATION: 99 % | HEART RATE: 61 BPM

## 2022-12-12 DIAGNOSIS — D64.9 ANEMIA, UNSPECIFIED: ICD-10-CM

## 2022-12-12 LAB
ALBUMIN SERPL ELPH-MCNC: 3 G/DL — LOW (ref 3.3–5)
ALP SERPL-CCNC: 79 U/L — SIGNIFICANT CHANGE UP (ref 40–120)
ALT FLD-CCNC: <5 U/L — LOW (ref 4–41)
ANION GAP SERPL CALC-SCNC: 11 MMOL/L — SIGNIFICANT CHANGE UP (ref 7–14)
AST SERPL-CCNC: 14 U/L — SIGNIFICANT CHANGE UP (ref 4–40)
BASE EXCESS BLDV CALC-SCNC: 1 MMOL/L — SIGNIFICANT CHANGE UP (ref -2–3)
BASOPHILS # BLD AUTO: 0.03 K/UL — SIGNIFICANT CHANGE UP (ref 0–0.2)
BASOPHILS NFR BLD AUTO: 0.5 % — SIGNIFICANT CHANGE UP (ref 0–2)
BILIRUB SERPL-MCNC: 0.4 MG/DL — SIGNIFICANT CHANGE UP (ref 0.2–1.2)
BLD GP AB SCN SERPL QL: NEGATIVE — SIGNIFICANT CHANGE UP
BLOOD GAS VENOUS COMPREHENSIVE RESULT: SIGNIFICANT CHANGE UP
BUN SERPL-MCNC: 27 MG/DL — HIGH (ref 7–23)
CALCIUM SERPL-MCNC: 8.4 MG/DL — SIGNIFICANT CHANGE UP (ref 8.4–10.5)
CHLORIDE BLDV-SCNC: 102 MMOL/L — SIGNIFICANT CHANGE UP (ref 96–108)
CHLORIDE SERPL-SCNC: 101 MMOL/L — SIGNIFICANT CHANGE UP (ref 98–107)
CK MB BLD-MCNC: 2.8 % — HIGH (ref 0–2.5)
CK MB CFR SERPL CALC: 1.7 NG/ML — SIGNIFICANT CHANGE UP
CK SERPL-CCNC: 61 U/L — SIGNIFICANT CHANGE UP (ref 30–200)
CO2 BLDV-SCNC: 28.5 MMOL/L — HIGH (ref 22–26)
CO2 SERPL-SCNC: 23 MMOL/L — SIGNIFICANT CHANGE UP (ref 22–31)
CREAT SERPL-MCNC: 5.37 MG/DL — HIGH (ref 0.5–1.3)
EGFR: 11 ML/MIN/1.73M2 — LOW
EOSINOPHIL # BLD AUTO: 0.13 K/UL — SIGNIFICANT CHANGE UP (ref 0–0.5)
EOSINOPHIL NFR BLD AUTO: 2 % — SIGNIFICANT CHANGE UP (ref 0–6)
FLUAV AG NPH QL: SIGNIFICANT CHANGE UP
FLUBV AG NPH QL: SIGNIFICANT CHANGE UP
GAS PNL BLDV: 134 MMOL/L — LOW (ref 136–145)
GLUCOSE BLDV-MCNC: 144 MG/DL — HIGH (ref 70–99)
GLUCOSE SERPL-MCNC: 146 MG/DL — HIGH (ref 70–99)
HCO3 BLDV-SCNC: 27 MMOL/L — SIGNIFICANT CHANGE UP (ref 22–29)
HCT VFR BLD CALC: 23.5 % — LOW (ref 39–50)
HCT VFR BLDA CALC: 21 % — CRITICAL LOW (ref 39–51)
HGB BLD CALC-MCNC: 7.1 G/DL — LOW (ref 13–17)
HGB BLD-MCNC: 6.8 G/DL — CRITICAL LOW (ref 13–17)
IANC: 5.03 K/UL — SIGNIFICANT CHANGE UP (ref 1.8–7.4)
IMM GRANULOCYTES NFR BLD AUTO: 0.2 % — SIGNIFICANT CHANGE UP (ref 0–0.9)
LACTATE BLDV-MCNC: 1.2 MMOL/L — SIGNIFICANT CHANGE UP (ref 0.5–2)
LYMPHOCYTES # BLD AUTO: 0.73 K/UL — LOW (ref 1–3.3)
LYMPHOCYTES # BLD AUTO: 11.3 % — LOW (ref 13–44)
MAGNESIUM SERPL-MCNC: 2.1 MG/DL — SIGNIFICANT CHANGE UP (ref 1.6–2.6)
MCHC RBC-ENTMCNC: 19.5 PG — LOW (ref 27–34)
MCHC RBC-ENTMCNC: 28.9 GM/DL — LOW (ref 32–36)
MCV RBC AUTO: 67.5 FL — LOW (ref 80–100)
MONOCYTES # BLD AUTO: 0.53 K/UL — SIGNIFICANT CHANGE UP (ref 0–0.9)
MONOCYTES NFR BLD AUTO: 8.2 % — SIGNIFICANT CHANGE UP (ref 2–14)
NEUTROPHILS # BLD AUTO: 5.03 K/UL — SIGNIFICANT CHANGE UP (ref 1.8–7.4)
NEUTROPHILS NFR BLD AUTO: 77.8 % — HIGH (ref 43–77)
NRBC # BLD: 0 /100 WBCS — SIGNIFICANT CHANGE UP (ref 0–0)
NRBC # FLD: 0 K/UL — SIGNIFICANT CHANGE UP (ref 0–0)
PCO2 BLDV: 50 MMHG — SIGNIFICANT CHANGE UP (ref 42–55)
PH BLDV: 7.34 — SIGNIFICANT CHANGE UP (ref 7.32–7.43)
PHOSPHATE SERPL-MCNC: 4.2 MG/DL — SIGNIFICANT CHANGE UP (ref 2.5–4.5)
PLATELET # BLD AUTO: 232 K/UL — SIGNIFICANT CHANGE UP (ref 150–400)
PO2 BLDV: 26 MMHG — SIGNIFICANT CHANGE UP
POTASSIUM BLDV-SCNC: 5.3 MMOL/L — HIGH (ref 3.5–5.1)
POTASSIUM SERPL-MCNC: 5 MMOL/L — SIGNIFICANT CHANGE UP (ref 3.5–5.3)
POTASSIUM SERPL-SCNC: 5 MMOL/L — SIGNIFICANT CHANGE UP (ref 3.5–5.3)
PROT SERPL-MCNC: 5.5 G/DL — LOW (ref 6–8.3)
RBC # BLD: 3.48 M/UL — LOW (ref 4.2–5.8)
RBC # FLD: 16.7 % — HIGH (ref 10.3–14.5)
RH IG SCN BLD-IMP: POSITIVE — SIGNIFICANT CHANGE UP
RSV RNA NPH QL NAA+NON-PROBE: SIGNIFICANT CHANGE UP
SAO2 % BLDV: 39.4 % — SIGNIFICANT CHANGE UP
SARS-COV-2 RNA SPEC QL NAA+PROBE: SIGNIFICANT CHANGE UP
SODIUM SERPL-SCNC: 135 MMOL/L — SIGNIFICANT CHANGE UP (ref 135–145)
TROPONIN T, HIGH SENSITIVITY RESULT: 104 NG/L — CRITICAL HIGH
TROPONIN T, HIGH SENSITIVITY RESULT: 115 NG/L — CRITICAL HIGH
WBC # BLD: 6.46 K/UL — SIGNIFICANT CHANGE UP (ref 3.8–10.5)
WBC # FLD AUTO: 6.46 K/UL — SIGNIFICANT CHANGE UP (ref 3.8–10.5)

## 2022-12-12 PROCEDURE — 93010 ELECTROCARDIOGRAM REPORT: CPT

## 2022-12-12 PROCEDURE — 99285 EMERGENCY DEPT VISIT HI MDM: CPT

## 2022-12-12 NOTE — ED PROVIDER NOTE - PHYSICAL EXAMINATION
Vital signs reviewed.   CONSTITUTIONAL: Well-appearing; well-nourished; in no apparent distress. Non-toxic appearing.   HEAD: Normocephalic, atraumatic.  EYES: Normal conjunctiva and no sclera injection noted  ENT: normal nose; no rhinorrhea  CARD: Normal S1, S2  RESP: Normal chest excursion with respiration; breath sounds clear and equal bilaterally  ABD/GI: soft, non-distended; non-tender  Rectal: Declined  EXT/MS: moves all extremities; distal pulses are normal, no pedal edema. AV fistula RT LE  SKIN: Normal for age and race; warm; dry; good turgor; no apparent lesions or exudate noted.  NEURO: Awake, alert, oriented x 3  PSYCH: Normal mood; appropriate affect.

## 2022-12-12 NOTE — ED ADULT NURSE NOTE - OBJECTIVE STATEMENT
pt presents to ED A&04 ambualtory at baseline coming in sent from dialysis due to low hemoglobin. Patient started dialysis last week and was found to have hemoglobin of 7. Offers no complaints at this time. R arm fistula. Respirations even and unlabored. Lung sounds clear with equal chest rise bilaterally. ABD is soft, non tender, non distended with normal active bowel sounds No complaints of chest pain, headache, nausea, dizziness, vomiting  SOB, fever, chills, weakness verbalized.. 20GL hand in place, labs sent sinus bradycardia on cardiac monitor. will continue to monitor

## 2022-12-12 NOTE — ED PROVIDER NOTE - OBJECTIVE STATEMENT
70 Y/O M w/ PMH of DM, HTN, CKD started Dialysis last week presents to ER for low hemoglobin. Found to have HGB of 7 on outpatient labs sent from dialysis center for evaluation. Received dialysis last week. Offers no complaints at presents. No dark, bloody stools or hemoptysis. Denies fever, chills, nausea/vomiting, dizziness, headache, chest pain, shortness of breath, palpitations, syncope. No hx of blood transfusion.

## 2022-12-12 NOTE — ED PROVIDER NOTE - NS ED ROS FT
Constitutional: (-) fever   Head: Normal cephalic, Atraumatic  Cardiovascular: (-) chest pain, (-) wheezing  Respiratory: (-) cough, (-) shortness of breath  Gastrointestinal: (-) vomiting, (-) diarrhea, (-) abdominal pain  : (-) dysuria   Musculoskeletal: (-) back pain  Integumentary: (-) rash, (-) edema  Neurological: (-)loc  Allergic/Immunologic: (-) pruritus

## 2022-12-12 NOTE — ED PROVIDER NOTE - ATTENDING APP SHARED VISIT CONTRIBUTION OF CARE
I have personally performed a face to face medical and diagnostic evaluation of the patient. I have discussed with and reviewed the ACP's note and agree with the History, ROS, Physical Exam and MDM unless otherwise indicated. A brief summary of my personal evaluation and impression can be found below.     72-year-old male with past medical history of diabetes, hypertension, new diagnosis ESRD now on dialysis presenting to the ER for low hemoglobin.  Patient has not started dialysis yet outpatient labs showing hemoglobin of 7, sent from hospital admission for dialysis inpatient blood transfusion.  Patient without any complaints at this time - no chest pain, no shortness of breath, no weakness, no lightheadedness.  No complaints of any bleeding–no melena, no nosebleeds.  On exam, vital signs stable with no focal exam findings.  Anemia likely secondary to known renal dysfunction.  Given patient is now dialysis dependent, hemodynamically stable, and not actively bleeding, would benefit from transfusion during dialysis.  We will call nephrology to coordinate dialysis orders, patient will need admission. Alex Prado, ED Attending

## 2022-12-12 NOTE — ED PROVIDER NOTE - CLINICAL SUMMARY MEDICAL DECISION MAKING FREE TEXT BOX
70 Y/O M w/ PMH of DM, HTN, CKD started Dialysis last week presents to ER for low hemoglobin.   EKG sinus bradycardia  CMP assess renal function, electrolytes  HGB/HCT. Pt declined rectal.   Re-evaluate

## 2022-12-12 NOTE — ED ADULT NURSE REASSESSMENT NOTE - NS ED NURSE REASSESS COMMENT FT1
report given to [facility] 2 RN Radha, pt getting blood transfusion, A&04 VSS, respirations even and unlabored, appears in NAD. No complaints of chest pain, headache, nausea, dizziness, vomiting  SOB, fever, chills verbalized. stable upon exit.

## 2022-12-12 NOTE — ED ADULT TRIAGE NOTE - CHIEF COMPLAINT QUOTE
Pt brought in by EMS from dialysis for low hemoglobin. Pt was unable to receive dialysis because hemoglobin was low. pt denies chest pain, sob, n/v/d, fever or chills.

## 2022-12-12 NOTE — ED PROVIDER NOTE - NS ED MD TWO NIGHTS YN
Yes
Review of Systems:  •CONSTITUTIONAL - No fever, No diaphoresis, No weight change  •RESPIRATORY - No shortness of breath, No cough  •CARDIAC -No chest pain, No palpitations  •GI - No abdominal pain, No nausea, No vomiting, No diarrhea, No constipation, No bright red blood per rectum or melena. No flank pain  •NEUROLOGIC - No numbness, No headache, No dizziness  All other systems negative, unless specified in HPI

## 2022-12-13 DIAGNOSIS — N18.6 END STAGE RENAL DISEASE: ICD-10-CM

## 2022-12-13 DIAGNOSIS — E11.9 TYPE 2 DIABETES MELLITUS WITHOUT COMPLICATIONS: ICD-10-CM

## 2022-12-13 DIAGNOSIS — E78.5 HYPERLIPIDEMIA, UNSPECIFIED: ICD-10-CM

## 2022-12-13 DIAGNOSIS — D64.9 ANEMIA, UNSPECIFIED: ICD-10-CM

## 2022-12-13 DIAGNOSIS — R94.31 ABNORMAL ELECTROCARDIOGRAM [ECG] [EKG]: ICD-10-CM

## 2022-12-13 DIAGNOSIS — I10 ESSENTIAL (PRIMARY) HYPERTENSION: ICD-10-CM

## 2022-12-13 LAB
A1C WITH ESTIMATED AVERAGE GLUCOSE RESULT: 6.5 % — HIGH (ref 4–5.6)
ANION GAP SERPL CALC-SCNC: 13 MMOL/L — SIGNIFICANT CHANGE UP (ref 7–14)
BASOPHILS # BLD AUTO: 0.02 K/UL — SIGNIFICANT CHANGE UP (ref 0–0.2)
BASOPHILS NFR BLD AUTO: 0.4 % — SIGNIFICANT CHANGE UP (ref 0–2)
BUN SERPL-MCNC: 28 MG/DL — HIGH (ref 7–23)
CALCIUM SERPL-MCNC: 8.3 MG/DL — LOW (ref 8.4–10.5)
CHLORIDE SERPL-SCNC: 100 MMOL/L — SIGNIFICANT CHANGE UP (ref 98–107)
CHOLEST SERPL-MCNC: 216 MG/DL — HIGH
CO2 SERPL-SCNC: 23 MMOL/L — SIGNIFICANT CHANGE UP (ref 22–31)
CREAT SERPL-MCNC: 5.4 MG/DL — HIGH (ref 0.5–1.3)
DIALYSIS INSTRUMENT RESULT - HEPATITIS B SURFACE ANTIGEN: NEGATIVE — SIGNIFICANT CHANGE UP
EGFR: 11 ML/MIN/1.73M2 — LOW
EOSINOPHIL # BLD AUTO: 0.27 K/UL — SIGNIFICANT CHANGE UP (ref 0–0.5)
EOSINOPHIL NFR BLD AUTO: 5.2 % — SIGNIFICANT CHANGE UP (ref 0–6)
ESTIMATED AVERAGE GLUCOSE: 140 — SIGNIFICANT CHANGE UP
ESTIMATED AVERAGE GLUCOSE: 140 — SIGNIFICANT CHANGE UP
FERRITIN SERPL-MCNC: 491 NG/ML — HIGH (ref 30–400)
GLUCOSE BLDC GLUCOMTR-MCNC: 112 MG/DL — HIGH (ref 70–99)
GLUCOSE BLDC GLUCOMTR-MCNC: 114 MG/DL — HIGH (ref 70–99)
GLUCOSE BLDC GLUCOMTR-MCNC: 114 MG/DL — HIGH (ref 70–99)
GLUCOSE BLDC GLUCOMTR-MCNC: 123 MG/DL — HIGH (ref 70–99)
GLUCOSE BLDC GLUCOMTR-MCNC: 135 MG/DL — HIGH (ref 70–99)
GLUCOSE BLDC GLUCOMTR-MCNC: 137 MG/DL — HIGH (ref 70–99)
GLUCOSE BLDC GLUCOMTR-MCNC: 187 MG/DL — HIGH (ref 70–99)
GLUCOSE SERPL-MCNC: 92 MG/DL — SIGNIFICANT CHANGE UP (ref 70–99)
HCT VFR BLD CALC: 27.2 % — LOW (ref 39–50)
HDLC SERPL-MCNC: 50 MG/DL — SIGNIFICANT CHANGE UP
HGB BLD-MCNC: 8.3 G/DL — LOW (ref 13–17)
IANC: 3.34 K/UL — SIGNIFICANT CHANGE UP (ref 1.8–7.4)
IMM GRANULOCYTES NFR BLD AUTO: 0.2 % — SIGNIFICANT CHANGE UP (ref 0–0.9)
IRON SATN MFR SERPL: 39 % — SIGNIFICANT CHANGE UP (ref 14–50)
IRON SATN MFR SERPL: 65 UG/DL — SIGNIFICANT CHANGE UP (ref 45–165)
LIPID PNL WITH DIRECT LDL SERPL: 137 MG/DL — HIGH
LYMPHOCYTES # BLD AUTO: 1.06 K/UL — SIGNIFICANT CHANGE UP (ref 1–3.3)
LYMPHOCYTES # BLD AUTO: 20.3 % — SIGNIFICANT CHANGE UP (ref 13–44)
MCHC RBC-ENTMCNC: 21 PG — LOW (ref 27–34)
MCHC RBC-ENTMCNC: 30.5 GM/DL — LOW (ref 32–36)
MCV RBC AUTO: 68.7 FL — LOW (ref 80–100)
MONOCYTES # BLD AUTO: 0.52 K/UL — SIGNIFICANT CHANGE UP (ref 0–0.9)
MONOCYTES NFR BLD AUTO: 10 % — SIGNIFICANT CHANGE UP (ref 2–14)
NEUTROPHILS # BLD AUTO: 3.34 K/UL — SIGNIFICANT CHANGE UP (ref 1.8–7.4)
NEUTROPHILS NFR BLD AUTO: 63.9 % — SIGNIFICANT CHANGE UP (ref 43–77)
NON HDL CHOLESTEROL: 166 MG/DL — HIGH
NRBC # BLD: 0 /100 WBCS — SIGNIFICANT CHANGE UP (ref 0–0)
NRBC # FLD: 0 K/UL — SIGNIFICANT CHANGE UP (ref 0–0)
PLATELET # BLD AUTO: 216 K/UL — SIGNIFICANT CHANGE UP (ref 150–400)
POTASSIUM SERPL-MCNC: 4.4 MMOL/L — SIGNIFICANT CHANGE UP (ref 3.5–5.3)
POTASSIUM SERPL-SCNC: 4.4 MMOL/L — SIGNIFICANT CHANGE UP (ref 3.5–5.3)
RBC # BLD: 3.96 M/UL — LOW (ref 4.2–5.8)
RBC # FLD: 17.7 % — HIGH (ref 10.3–14.5)
SODIUM SERPL-SCNC: 136 MMOL/L — SIGNIFICANT CHANGE UP (ref 135–145)
T4 FREE SERPL-MCNC: 1.2 NG/DL — SIGNIFICANT CHANGE UP (ref 0.9–1.8)
TIBC SERPL-MCNC: 167 UG/DL — LOW (ref 220–430)
TRIGL SERPL-MCNC: 147 MG/DL — SIGNIFICANT CHANGE UP
TSH SERPL-MCNC: 5.16 UIU/ML — HIGH (ref 0.27–4.2)
UIBC SERPL-MCNC: 102 UG/DL — LOW (ref 110–370)
WBC # BLD: 5.22 K/UL — SIGNIFICANT CHANGE UP (ref 3.8–10.5)
WBC # FLD AUTO: 5.22 K/UL — SIGNIFICANT CHANGE UP (ref 3.8–10.5)

## 2022-12-13 PROCEDURE — 99223 1ST HOSP IP/OBS HIGH 75: CPT

## 2022-12-13 PROCEDURE — 12345: CPT | Mod: NC

## 2022-12-13 RX ORDER — LANOLIN ALCOHOL/MO/W.PET/CERES
3 CREAM (GRAM) TOPICAL AT BEDTIME
Refills: 0 | Status: DISCONTINUED | OUTPATIENT
Start: 2022-12-13 | End: 2022-12-14

## 2022-12-13 RX ORDER — DEXTROSE 50 % IN WATER 50 %
25 SYRINGE (ML) INTRAVENOUS ONCE
Refills: 0 | Status: DISCONTINUED | OUTPATIENT
Start: 2022-12-13 | End: 2022-12-14

## 2022-12-13 RX ORDER — SODIUM CHLORIDE 9 MG/ML
1000 INJECTION, SOLUTION INTRAVENOUS
Refills: 0 | Status: DISCONTINUED | OUTPATIENT
Start: 2022-12-13 | End: 2022-12-14

## 2022-12-13 RX ORDER — DEXTROSE 50 % IN WATER 50 %
12.5 SYRINGE (ML) INTRAVENOUS ONCE
Refills: 0 | Status: DISCONTINUED | OUTPATIENT
Start: 2022-12-13 | End: 2022-12-14

## 2022-12-13 RX ORDER — INSULIN LISPRO 100/ML
VIAL (ML) SUBCUTANEOUS AT BEDTIME
Refills: 0 | Status: DISCONTINUED | OUTPATIENT
Start: 2022-12-13 | End: 2022-12-14

## 2022-12-13 RX ORDER — HYDRALAZINE HCL 50 MG
100 TABLET ORAL ONCE
Refills: 0 | Status: COMPLETED | OUTPATIENT
Start: 2022-12-13 | End: 2022-12-13

## 2022-12-13 RX ORDER — CHLORHEXIDINE GLUCONATE 213 G/1000ML
1 SOLUTION TOPICAL DAILY
Refills: 0 | Status: DISCONTINUED | OUTPATIENT
Start: 2022-12-13 | End: 2022-12-14

## 2022-12-13 RX ORDER — SODIUM BICARBONATE 1 MEQ/ML
1300 SYRINGE (ML) INTRAVENOUS
Refills: 0 | Status: DISCONTINUED | OUTPATIENT
Start: 2022-12-13 | End: 2022-12-14

## 2022-12-13 RX ORDER — NIFEDIPINE 30 MG
120 TABLET, EXTENDED RELEASE 24 HR ORAL DAILY
Refills: 0 | Status: DISCONTINUED | OUTPATIENT
Start: 2022-12-13 | End: 2022-12-14

## 2022-12-13 RX ORDER — DEXTROSE 50 % IN WATER 50 %
15 SYRINGE (ML) INTRAVENOUS ONCE
Refills: 0 | Status: DISCONTINUED | OUTPATIENT
Start: 2022-12-13 | End: 2022-12-14

## 2022-12-13 RX ORDER — ACETAMINOPHEN 500 MG
650 TABLET ORAL EVERY 6 HOURS
Refills: 0 | Status: DISCONTINUED | OUTPATIENT
Start: 2022-12-13 | End: 2022-12-14

## 2022-12-13 RX ORDER — CALCIUM ACETATE 667 MG
1334 TABLET ORAL
Refills: 0 | Status: DISCONTINUED | OUTPATIENT
Start: 2022-12-13 | End: 2022-12-14

## 2022-12-13 RX ORDER — INSULIN LISPRO 100/ML
VIAL (ML) SUBCUTANEOUS
Refills: 0 | Status: DISCONTINUED | OUTPATIENT
Start: 2022-12-13 | End: 2022-12-14

## 2022-12-13 RX ORDER — ERYTHROPOIETIN 10000 [IU]/ML
10000 INJECTION, SOLUTION INTRAVENOUS; SUBCUTANEOUS ONCE
Refills: 0 | Status: COMPLETED | OUTPATIENT
Start: 2022-12-13 | End: 2022-12-13

## 2022-12-13 RX ORDER — CARVEDILOL PHOSPHATE 80 MG/1
12.5 CAPSULE, EXTENDED RELEASE ORAL EVERY 12 HOURS
Refills: 0 | Status: DISCONTINUED | OUTPATIENT
Start: 2022-12-13 | End: 2022-12-14

## 2022-12-13 RX ORDER — ONDANSETRON 8 MG/1
4 TABLET, FILM COATED ORAL EVERY 8 HOURS
Refills: 0 | Status: DISCONTINUED | OUTPATIENT
Start: 2022-12-13 | End: 2022-12-14

## 2022-12-13 RX ORDER — HYDRALAZINE HCL 50 MG
100 TABLET ORAL THREE TIMES A DAY
Refills: 0 | Status: DISCONTINUED | OUTPATIENT
Start: 2022-12-13 | End: 2022-12-14

## 2022-12-13 RX ORDER — SODIUM CHLORIDE 9 MG/ML
100 INJECTION INTRAMUSCULAR; INTRAVENOUS; SUBCUTANEOUS
Refills: 0 | Status: DISCONTINUED | OUTPATIENT
Start: 2022-12-13 | End: 2022-12-14

## 2022-12-13 RX ORDER — GLUCAGON INJECTION, SOLUTION 0.5 MG/.1ML
1 INJECTION, SOLUTION SUBCUTANEOUS ONCE
Refills: 0 | Status: DISCONTINUED | OUTPATIENT
Start: 2022-12-13 | End: 2022-12-14

## 2022-12-13 RX ORDER — ATORVASTATIN CALCIUM 80 MG/1
20 TABLET, FILM COATED ORAL AT BEDTIME
Refills: 0 | Status: DISCONTINUED | OUTPATIENT
Start: 2022-12-13 | End: 2022-12-14

## 2022-12-13 RX ADMIN — CARVEDILOL PHOSPHATE 12.5 MILLIGRAM(S): 80 CAPSULE, EXTENDED RELEASE ORAL at 07:04

## 2022-12-13 RX ADMIN — Medication 100 MILLIGRAM(S): at 07:04

## 2022-12-13 RX ADMIN — Medication 120 MILLIGRAM(S): at 07:04

## 2022-12-13 RX ADMIN — CARVEDILOL PHOSPHATE 12.5 MILLIGRAM(S): 80 CAPSULE, EXTENDED RELEASE ORAL at 21:49

## 2022-12-13 RX ADMIN — Medication 1334 MILLIGRAM(S): at 09:26

## 2022-12-13 RX ADMIN — Medication 100 MILLIGRAM(S): at 21:49

## 2022-12-13 RX ADMIN — Medication 1300 MILLIGRAM(S): at 21:49

## 2022-12-13 RX ADMIN — Medication 100 MILLIGRAM(S): at 14:02

## 2022-12-13 RX ADMIN — ATORVASTATIN CALCIUM 20 MILLIGRAM(S): 80 TABLET, FILM COATED ORAL at 21:49

## 2022-12-13 RX ADMIN — Medication 1: at 12:24

## 2022-12-13 RX ADMIN — ERYTHROPOIETIN 10000 UNIT(S): 10000 INJECTION, SOLUTION INTRAVENOUS; SUBCUTANEOUS at 17:45

## 2022-12-13 RX ADMIN — Medication 1300 MILLIGRAM(S): at 07:04

## 2022-12-13 RX ADMIN — Medication 100 MILLIGRAM(S): at 02:46

## 2022-12-13 NOTE — H&P ADULT - NSHPLABSRESULTS_GEN_ALL_CORE
labs reviewed                        6.8    6.46  )-----------( 232      ( 12 Dec 2022 21:16 )             23.5       12-12    135  |  101  |  27<H>  ----------------------------<  146<H>  5.0   |  23  |  5.37<H>    Ca    8.4      12 Dec 2022 21:15  Phos  4.2     12-12  Mg     2.10     12-12    TPro  5.5<L>  /  Alb  3.0<L>  /  TBili  0.4  /  DBili  x   /  AST  14  /  ALT  <5<L>  /  AlkPhos  79  12-12      CARDIAC MARKERS ( 12 Dec 2022 21:15 )  x     / x     / 61 U/L / x     / 1.7 ng/mL    20:37 - VBG - pH: 7.34  | pCO2: 50    | pO2: 26    | Lactate: 1.2        EKG interpreted by myself: with TWI II, III, AVF and Twave changes in V2

## 2022-12-13 NOTE — H&P ADULT - PROBLEM/PLAN-1
How Severe Is Your Skin Lesion?: moderate
Have Your Skin Lesions Been Treated?: not been treated
Is This A New Presentation, Or A Follow-Up?: Growth
DISPLAY PLAN FREE TEXT

## 2022-12-13 NOTE — H&P ADULT - PROBLEM SELECTOR PLAN 2
Chronic unstable  Missed HD today secondary to above  Nephrology Consult in AM ( follow with Dr. Franklin)

## 2022-12-13 NOTE — PATIENT PROFILE ADULT - NSTRANSFERBELONGINGSDISPO_GEN_A_NUR
Medication Question or Clarification    What medication are you calling about (include dose and sig)?:    Disp Refills Start End    ciprofloxacin HCl (CILOXAN) 0.3 % ophthalmic solution 5 mL 0 11/9/2020     Sig: Administer 1 drop, every 2 hours, while awake, for 2 days. Then 1 drop, every 4 hours, while awake, for the next 5 days.    Sent to pharmacy as: ciprofloxacin 0.3 % eye drops (CILOXAN)    E-Prescribing Status: Receipt confirmed by pharmacy (11/9/2020 10:15 AM CST)        Who prescribed the medication?: Willian Wright CNP   What is your question/concern?: 2 sets of directions came over, please clarify.   Requested [facility]acy: [facility]  Okay to leave a detailed message?: Yes       with patient

## 2022-12-13 NOTE — H&P ADULT - HISTORY OF PRESENT ILLNESS
81 yr old male with a pmh of HTN, HLD, T2DM on insulin< ESRD on HD MWF (started HD last week this was his 3rd session). Pt presented for HD and had lab work drawn where he was fdund to have an Hb of 7 and was sent to the ED. He did not have HD today.  Rectal: declined - stated "if I have no bleeding why do I need it" even after explaining to pt why it is necessary he still declined.  Denies  headache, dizziness, chest pain, palpitations, SOB, abdominal pain, joint pain, diarrhea/constipation, melena/BRPR urinary symptoms.   Vitals: T 98.6, HR 59, /80, RR 14 satting 100% RA

## 2022-12-13 NOTE — CONSULT NOTE ADULT - ASSESSMENT
81 yr old male with a pmh of HTN, HLD, T2DM on insulin, ESRD on HD MWF present with anemia    ESRD on HD MWF  via avf  hd today  consent in chart  renal diet    anemia  check iron panel, b12 and folate acid  epo with hd  transfuse to keep hb>8  monitor    htn  controlled  resume home meds  monitor    ckd-mbd  check pth, phos level  monitor phos and calcium daily 81 yr old male with a pmh of HTN, HLD, T2DM on insulin, ESRD on HD MWF present with anemia    ESRD on HD MWF  via avf  last hd friday  hd today  consent in chart  renal diet    anemia  check iron panel, b12 and folate acid  epo with hd  transfuse to keep hb>8  monitor    htn  controlled  resume home meds  monitor    ckd-mbd  check pth, phos level  monitor phos and calcium daily

## 2022-12-13 NOTE — CONSULT NOTE ADULT - ASSESSMENT
81 yr old male with a pmh of HTN, HLD, T2DM on insulin< ESRD on HD MWF (started HD last week this was his 3rd session). Pt presented for HD found to be anemic, HGB of 7, no symptoms or reports of bleeding. Cardiology called for elevated troponin and EKG with TWI, different from previous EKG.   81 yr old male with a pmh of HTN, HLD, T2DM on insulin< ESRD on HD MWF (started HD last week this was his 3rd session). Pt presented for HD found to be anemic, HGB of 7, no symptoms or reports of bleeding. Cardiology called for elevated troponin and EKG with TWI, different from previous EKG.    PLAN:  Patient seen and examined. He has no reports of chest pain or shortness of breath. He is resting comfortably in the ER  -EKG reviewed, there are TWI in V2 with non-specific ST changes in the inferior leads  -EKG repeated again in ER, and it is more reflective of LVH with repolarization  -low suspicion for ACS  -troponin elevation 115- delta 104 likely in setting of ESRD, CPK is 61 CKMB is 1.7 index 2.8  -ECHO reviewed from 8/22, EF of 64% with mild concentric LVH normal LV systolic function  -no need for ACS treatment at this time  -continue with home regimen for blood pressure  -continue supportive care with PRBC for anemia  -this consult has been discussed with Dr. Hima Mallapallatti, cardiology fellow.    Mavis Bejarano, [place]  [phone]

## 2022-12-13 NOTE — PROVIDER CONTACT NOTE (OTHER) - ACTION/TREATMENT ORDERED:
due BP meds given as ordered, no new orders made. will continue to monitor
provider to order 1 time dose of hydralazine, recheck BP in an hour

## 2022-12-13 NOTE — H&P ADULT - NSHPPHYSICALEXAM_GEN_ALL_CORE
PHYSICAL EXAM:  GENERAL: NAD, well-developed, well-nourished  HEAD:  Atraumatic, Normocephalic  EYES: EOMI, PERRL, conjunctiva and sclera clear  NECK: Supple, No JVD  CHEST/LUNG: Clear to auscultation bilaterally; No wheezes, rales or rhonchi  HEART: Regular rate and rhythm; No murmurs, rubs, or gallops, (+)S1, S2  ABDOMEN: Soft, Nontender, Nondistended; Normal Bowel sounds   Rectal: declined - stated "if I have no bleeding why do I need it"  EXTREMITIES:  2+ Peripheral Pulses, No clubbing, cyanosis, or edema  PSYCH: normal mood and affect  NEUROLOGY: AAOx3, non-focal  SKIN: No rashes or lesions

## 2022-12-13 NOTE — PROGRESS NOTE ADULT - SUBJECTIVE AND OBJECTIVE BOX
***************************************************************  Bowen Thao, PGY2  Internal Medicine   NS pager: [phone]  [facility] pager: [phone]  ***************************************************************        JIMMY BLAND  72y  Male      Patient is a 72y old  Male who presents with a chief complaint of Anemia (13 Dec 2022 01:34)      INTERVAL HPI/OVERNIGHT EVENTS:       FAMILY HISTORY:  FHx: diabetes mellitus (Father, Aunt)      T(C): 36.6 (12-13-22 @ 06:53), Max: 37 (12-12-22 @ 19:24)  HR: 60 (12-13-22 @ 06:53) (54 - 61)  BP: 190/70 (12-13-22 @ 06:53) (162/60 - 194/86)  RR: 17 (12-13-22 @ 06:53) (14 - 18)  SpO2: 100% (12-13-22 @ 06:53) (99% - 100%)  Wt(kg): --Vital Signs Last 24 Hrs  T(C): 36.6 (13 Dec 2022 06:53), Max: 37 (12 Dec 2022 19:24)  T(F): 97.9 (13 Dec 2022 06:53), Max: 98.6 (12 Dec 2022 19:24)  HR: 60 (13 Dec 2022 06:53) (54 - 61)  BP: 190/70 (13 Dec 2022 06:53) (162/60 - 194/86)  BP(mean): --  RR: 17 (13 Dec 2022 06:53) (14 - 18)  SpO2: 100% (13 Dec 2022 06:53) (99% - 100%)    Parameters below as of 13 Dec 2022 06:53  Patient On (Oxygen Delivery Method): room air      No Known Allergies      PHYSICAL EXAM:  GENERAL: NAD, laying comfortably in bed  HEAD:  Atraumatic, Normocephalic  EYES: EOMI, PERRLA, conjunctiva and sclera clear  ENMT: No tonsillar erythema, exudates, or enlargement; Moist mucous membranes  NECK: Supple, No JVD  NERVOUS SYSTEM:  Alert & Oriented X3, Good concentration; Motor Strength grossly intact in B/L upper and lower extremities;   CHEST/LUNG: Clear to auscultation bilaterally; No rales, rhonchi, wheezing, or rubs  HEART: Regular rate and rhythm; No murmurs, rubs, or gallops  ABDOMEN: Soft, Nontender, Nondistended; Bowel sounds present  EXTREMITIES:  No clubbing, cyanosis, or edema  LYMPH: No lymphadenopathy noted  SKIN: No rashes or lesions        LABS:                            6.8    6.46  )-----------( 232      ( 12 Dec 2022 21:16 )             23.5       12-12    135  |  101  |  27<H>  ----------------------------<  146<H>  5.0   |  23  |  5.37<H>    Ca    8.4      12 Dec 2022 21:15  Phos  4.2     12-12  Mg     2.10     12-12    TPro  5.5<L>  /  Alb  3.0<L>  /  TBili  0.4  /  DBili  x   /  AST  14  /  ALT  <5<L>  /  AlkPhos  79  12-12                      CARDIAC MARKERS ( 12 Dec 2022 21:15 )  x     / x     / 61 U/L / x     / 1.7 ng/mL        CAPILLARY BLOOD GLUCOSE      POCT Blood Glucose.: 114 mg/dL (13 Dec 2022 02:48)                RADIOLOGY & ADDITIONAL TESTS:      acetaminophen     Tablet .. 650 milliGRAM(s) Oral every 6 hours PRN  aluminum hydroxide/magnesium hydroxide/simethicone Suspension 30 milliLiter(s) Oral every 4 hours PRN  atorvastatin 20 milliGRAM(s) Oral at bedtime  calcium acetate 1334 milliGRAM(s) Oral two times a day with meals  carvedilol 12.5 milliGRAM(s) Oral every 12 hours  dextrose 5%. 1000 milliLiter(s) IV Continuous <Continuous>  dextrose 5%. 1000 milliLiter(s) IV Continuous <Continuous>  dextrose 50% Injectable 25 Gram(s) IV Push once  dextrose 50% Injectable 12.5 Gram(s) IV Push once  dextrose 50% Injectable 25 Gram(s) IV Push once  dextrose Oral Gel 15 Gram(s) Oral once PRN  glucagon  Injectable 1 milliGRAM(s) IntraMuscular once  hydrALAZINE 100 milliGRAM(s) Oral three times a day  insulin lispro (ADMELOG) corrective regimen sliding scale   SubCutaneous three times a day before meals  insulin lispro (ADMELOG) corrective regimen sliding scale   SubCutaneous at bedtime  melatonin 3 milliGRAM(s) Oral at bedtime PRN  NIFEdipine  milliGRAM(s) Oral daily  ondansetron Injectable 4 milliGRAM(s) IV Push every 8 hours PRN  sodium bicarbonate 1300 milliGRAM(s) Oral two times a day      HEALTH ISSUES - PROBLEM Dx:  Anemia    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    Type 2 diabetes mellitus treated with insulin    Benign essential HTN    HLD (hyperlipidemia)    Abnormal EKG           ***************************************************************  Bowen Thao, PGY2  Internal Medicine   NS pager: [phone]  [facility] pager: [phone]  ***************************************************************        JIMMY BLAND  72y  Male      Patient is a 72y old  Male who presents with a chief complaint of Anemia (13 Dec 2022 01:34)      INTERVAL HPI/OVERNIGHT EVENTS: Pt admitted overnight, at bedside had no complaints. Denied fever, chills, CP, SOB, nausea, vomiting, diarrhea, constipation, dysuria.      FAMILY HISTORY:  FHx: diabetes mellitus (Father, Aunt)      T(C): 36.6 (12-13-22 @ 06:53), Max: 37 (12-12-22 @ 19:24)  HR: 60 (12-13-22 @ 06:53) (54 - 61)  BP: 190/70 (12-13-22 @ 06:53) (162/60 - 194/86)  RR: 17 (12-13-22 @ 06:53) (14 - 18)  SpO2: 100% (12-13-22 @ 06:53) (99% - 100%)  Wt(kg): --Vital Signs Last 24 Hrs  T(C): 36.6 (13 Dec 2022 06:53), Max: 37 (12 Dec 2022 19:24)  T(F): 97.9 (13 Dec 2022 06:53), Max: 98.6 (12 Dec 2022 19:24)  HR: 60 (13 Dec 2022 06:53) (54 - 61)  BP: 190/70 (13 Dec 2022 06:53) (162/60 - 194/86)  BP(mean): --  RR: 17 (13 Dec 2022 06:53) (14 - 18)  SpO2: 100% (13 Dec 2022 06:53) (99% - 100%)    Parameters below as of 13 Dec 2022 06:53  Patient On (Oxygen Delivery Method): room air      No Known Allergies      PHYSICAL EXAM:  GENERAL: NAD, laying comfortably in bed  HEAD:  Atraumatic, Normocephalic  EYES: EOMI, PERRLA, conjunctiva and sclera clear  ENMT: No tonsillar erythema, exudates, or enlargement; Moist mucous membranes  NECK: Supple, No JVD  NERVOUS SYSTEM:  Alert & Oriented X3, Good concentration; Motor Strength grossly intact in B/L upper and lower extremities;   CHEST/LUNG: Clear to auscultation bilaterally; No rales, rhonchi, wheezing, or rubs  HEART: Regular rate and rhythm; No murmurs, rubs, or gallops  ABDOMEN: Soft, Nontender, Nondistended; Bowel sounds present  EXTREMITIES:  No clubbing, cyanosis, or edema  LYMPH: No lymphadenopathy noted  SKIN: No rashes or lesions        LABS:                            6.8    6.46  )-----------( 232      ( 12 Dec 2022 21:16 )             23.5       12-12    135  |  101  |  27<H>  ----------------------------<  146<H>  5.0   |  23  |  5.37<H>    Ca    8.4      12 Dec 2022 21:15  Phos  4.2     12-12  Mg     2.10     12-12    TPro  5.5<L>  /  Alb  3.0<L>  /  TBili  0.4  /  DBili  x   /  AST  14  /  ALT  <5<L>  /  AlkPhos  79  12-12                      CARDIAC MARKERS ( 12 Dec 2022 21:15 )  x     / x     / 61 U/L / x     / 1.7 ng/mL        CAPILLARY BLOOD GLUCOSE      POCT Blood Glucose.: 114 mg/dL (13 Dec 2022 02:48)                RADIOLOGY & ADDITIONAL TESTS:      acetaminophen     Tablet .. 650 milliGRAM(s) Oral every 6 hours PRN  aluminum hydroxide/magnesium hydroxide/simethicone Suspension 30 milliLiter(s) Oral every 4 hours PRN  atorvastatin 20 milliGRAM(s) Oral at bedtime  calcium acetate 1334 milliGRAM(s) Oral two times a day with meals  carvedilol 12.5 milliGRAM(s) Oral every 12 hours  dextrose 5%. 1000 milliLiter(s) IV Continuous <Continuous>  dextrose 5%. 1000 milliLiter(s) IV Continuous <Continuous>  dextrose 50% Injectable 25 Gram(s) IV Push once  dextrose 50% Injectable 12.5 Gram(s) IV Push once  dextrose 50% Injectable 25 Gram(s) IV Push once  dextrose Oral Gel 15 Gram(s) Oral once PRN  glucagon  Injectable 1 milliGRAM(s) IntraMuscular once  hydrALAZINE 100 milliGRAM(s) Oral three times a day  insulin lispro (ADMELOG) corrective regimen sliding scale   SubCutaneous three times a day before meals  insulin lispro (ADMELOG) corrective regimen sliding scale   SubCutaneous at bedtime  melatonin 3 milliGRAM(s) Oral at bedtime PRN  NIFEdipine  milliGRAM(s) Oral daily  ondansetron Injectable 4 milliGRAM(s) IV Push every 8 hours PRN  sodium bicarbonate 1300 milliGRAM(s) Oral two times a day      HEALTH ISSUES - PROBLEM Dx:  Anemia    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    Type 2 diabetes mellitus treated with insulin    Benign essential HTN    HLD (hyperlipidemia)    Abnormal EKG

## 2022-12-13 NOTE — CONSULT NOTE ADULT - SUBJECTIVE AND OBJECTIVE BOX
[facility] [place] [facility]   MD ELIANE BHAGAT MD KRISTINE SOLTANPOUR, DO ANGELA WONG, PA        TEL:  OFFICE: [phone]  From 5pm-7am answering service [phone]    --- INITIAL RENAL CONSULT NOTE ---date of service 12-13-22 @ 13:35    HPI:  81 yr old male with a pmh of HTN, HLD, T2DM on insulin, ESRD on HD MWF (started HD last week). Pt presented for HD and had lab work drawn where he was fdund to have an Hb of 7 and was sent to the ED. Denies  headache, dizziness, chest pain, palpitations, SOB, abdominal pain, joint pain, diarrhea/constipation, melena/BRPR urinary symptoms.     Allergies:  No Known Allergies      PAST MEDICAL & SURGICAL HISTORY:  Diabetes      Benign essential HTN      HLD (hyperlipidemia)      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      No significant past surgical history          Home Medications Reviewed    Hospital Medications:   MEDICATIONS  (STANDING):  atorvastatin 20 milliGRAM(s) Oral at bedtime  calcium acetate 1334 milliGRAM(s) Oral two times a day with meals  carvedilol 12.5 milliGRAM(s) Oral every 12 hours  dextrose 5%. 1000 milliLiter(s) (100 mL/Hr) IV Continuous <Continuous>  dextrose 5%. 1000 milliLiter(s) (50 mL/Hr) IV Continuous <Continuous>  dextrose 50% Injectable 25 Gram(s) IV Push once  dextrose 50% Injectable 12.5 Gram(s) IV Push once  dextrose 50% Injectable 25 Gram(s) IV Push once  glucagon  Injectable 1 milliGRAM(s) IntraMuscular once  hydrALAZINE 100 milliGRAM(s) Oral three times a day  insulin lispro (ADMELOG) corrective regimen sliding scale   SubCutaneous three times a day before meals  insulin lispro (ADMELOG) corrective regimen sliding scale   SubCutaneous at bedtime  NIFEdipine  milliGRAM(s) Oral daily  sodium bicarbonate 1300 milliGRAM(s) Oral two times a day      SOCIAL HISTORY:  Denies ETOh, Smoking,     FAMILY HISTORY:  FHx: diabetes mellitus (Father, Aunt)        REVIEW OF SYSTEMS:  CONSTITUTIONAL: No weakness, fevers or chills  EYES/ENT: No visual changes;  No vertigo or throat pain   NECK: No pain or stiffness  RESPIRATORY: No cough, wheezing, hemoptysis; No shortness of breath  CARDIOVASCULAR: No chest pain or palpitations.  GASTROINTESTINAL: No abdominal or epigastric pain. No nausea, vomiting, or hematemesis; No diarrhea or constipation. No melena or hematochezia.  GENITOURINARY: No dysuria, frequency, foamy urine, urinary urgency, incontinence or hematuria  NEUROLOGICAL: No numbness or weakness  SKIN: No itching, burning, rashes, or lesions   VASCULAR: No bilateral lower extremity edema.   All other review of systems is negative unless indicated above.    VITALS:  T(F): 97.9 (12-13-22 @ 06:53), Max: 98.6 (12-12-22 @ 19:24)  HR: 57 (12-13-22 @ 07:51)  BP: 143/56 (12-13-22 @ 07:51)  RR: 17 (12-13-22 @ 06:53)  SpO2: 100% (12-13-22 @ 06:53)  Wt(kg): --    Height (cm): 172.7 (12-13 @ 03:21)  Weight (kg): 62.4 (12-13 @ 03:21)  BMI (kg/m2): 20.9 (12-13 @ 03:21)  BSA (m2): 1.74 (12-13 @ 03:21)    PHYSICAL EXAM:  General: NAD  HEENT: anicteric sclera, oropharynx clear, MMM  Neck: No JVD  Respiratory: CTAB, no wheezes, rales or rhonchi  Cardiovascular: S1, S2, RRR  Gastrointestinal: BS+, soft, NT/ND  Extremities: No cyanosis or clubbing. No peripheral edema  Neurological: A/O x 3, no focal deficits  Psychiatric: Normal mood, normal affect  : No CVA tenderness. No belle.   Skin: No rashes  Vascular Access: right avf    LABS:  12-13    136  |  100  |  28<H>  ----------------------------<  92  4.4   |  23  |  5.40<H>    Ca    8.3<L>      13 Dec 2022 05:50  Phos  4.2     12-12  Mg     2.10     12-12    TPro  5.5<L>  /  Alb  3.0<L>  /  TBili  0.4  /  DBili      /  AST  14  /  ALT  <5<L>  /  AlkPhos  79  12-12    Creatinine Trend: 5.40 <--, 5.37 <--                        8.3    5.22  )-----------( 216      ( 13 Dec 2022 05:50 )             27.2     Urine Studies:        RADIOLOGY & ADDITIONAL STUDIES:                 [facility] [place] [facility]   MD ELIANE BHAGAT MD KRISTINE SOLTANPOUR, DO ANGELA WONG, PA        TEL:  OFFICE: [phone]  From 5pm-7am answering service [phone]    --- INITIAL RENAL CONSULT NOTE ---date of service 12-13-22 @ 13:35    HPI:  81 yr old male with a pmh of HTN, HLD, T2DM on insulin, ESRD on HD MWF (started HD last week). Pt presented for HD and had lab work drawn where he was fdund to have an Hb of 7 and was sent to the ED. Denies  headache, dizziness, chest pain, palpitations, SOB, abdominal pain, joint pain, diarrhea/constipation, melena/BRPR urinary symptoms. Nephrology consulted for dialysis needs.     Allergies:  No Known Allergies      PAST MEDICAL & SURGICAL HISTORY:  Diabetes      Benign essential HTN      HLD (hyperlipidemia)      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      No significant past surgical history          Home Medications Reviewed    Hospital Medications:   MEDICATIONS  (STANDING):  atorvastatin 20 milliGRAM(s) Oral at bedtime  calcium acetate 1334 milliGRAM(s) Oral two times a day with meals  carvedilol 12.5 milliGRAM(s) Oral every 12 hours  dextrose 5%. 1000 milliLiter(s) (100 mL/Hr) IV Continuous <Continuous>  dextrose 5%. 1000 milliLiter(s) (50 mL/Hr) IV Continuous <Continuous>  dextrose 50% Injectable 25 Gram(s) IV Push once  dextrose 50% Injectable 12.5 Gram(s) IV Push once  dextrose 50% Injectable 25 Gram(s) IV Push once  glucagon  Injectable 1 milliGRAM(s) IntraMuscular once  hydrALAZINE 100 milliGRAM(s) Oral three times a day  insulin lispro (ADMELOG) corrective regimen sliding scale   SubCutaneous three times a day before meals  insulin lispro (ADMELOG) corrective regimen sliding scale   SubCutaneous at bedtime  NIFEdipine  milliGRAM(s) Oral daily  sodium bicarbonate 1300 milliGRAM(s) Oral two times a day      SOCIAL HISTORY:  Denies ETOh, Smoking,     FAMILY HISTORY:  FHx: diabetes mellitus (Father, Aunt)        REVIEW OF SYSTEMS:  CONSTITUTIONAL: No weakness, fevers or chills  EYES/ENT: No visual changes;  No vertigo or throat pain   NECK: No pain or stiffness  RESPIRATORY: No cough, wheezing, hemoptysis; No shortness of breath  CARDIOVASCULAR: No chest pain or palpitations.  GASTROINTESTINAL: No abdominal or epigastric pain. No nausea, vomiting, or hematemesis; No diarrhea or constipation. No melena or hematochezia.  GENITOURINARY: No dysuria, frequency, foamy urine, urinary urgency, incontinence or hematuria  NEUROLOGICAL: No numbness or weakness  SKIN: No itching, burning, rashes, or lesions   VASCULAR: No bilateral lower extremity edema.   All other review of systems is negative unless indicated above.    VITALS:  T(F): 97.9 (12-13-22 @ 06:53), Max: 98.6 (12-12-22 @ 19:24)  HR: 57 (12-13-22 @ 07:51)  BP: 143/56 (12-13-22 @ 07:51)  RR: 17 (12-13-22 @ 06:53)  SpO2: 100% (12-13-22 @ 06:53)  Wt(kg): --    Height (cm): 172.7 (12-13 @ 03:21)  Weight (kg): 62.4 (12-13 @ 03:21)  BMI (kg/m2): 20.9 (12-13 @ 03:21)  BSA (m2): 1.74 (12-13 @ 03:21)    PHYSICAL EXAM:  General: NAD  HEENT: anicteric sclera, oropharynx clear, MMM  Neck: No JVD  Respiratory: CTAB, no wheezes, rales or rhonchi  Cardiovascular: S1, S2, RRR  Gastrointestinal: BS+, soft, NT/ND  Extremities: No cyanosis or clubbing. No peripheral edema  Neurological: A/O x 3, no focal deficits  Psychiatric: Normal mood, normal affect  : No CVA tenderness. No belle.   Skin: No rashes  Vascular Access: right avf    LABS:  12-13    136  |  100  |  28<H>  ----------------------------<  92  4.4   |  23  |  5.40<H>    Ca    8.3<L>      13 Dec 2022 05:50  Phos  4.2     12-12  Mg     2.10     12-12    TPro  5.5<L>  /  Alb  3.0<L>  /  TBili  0.4  /  DBili      /  AST  14  /  ALT  <5<L>  /  AlkPhos  79  12-12    Creatinine Trend: 5.40 <--, 5.37 <--                        8.3    5.22  )-----------( 216      ( 13 Dec 2022 05:50 )             27.2     Urine Studies:        RADIOLOGY & ADDITIONAL STUDIES:

## 2022-12-13 NOTE — PROGRESS NOTE ADULT - PROBLEM SELECTOR PLAN 5
Chronic moderate exacerbation    Pt not sure of home dose  Per note on 11/29/22 pt on Novolin 14units TID- discussed with pharmacy who advised to start LDCS and to monitor for the time being  A1c and lipid panel in AM

## 2022-12-13 NOTE — PROVIDER CONTACT NOTE (OTHER) - ASSESSMENT
no distress noted, patient denies s/s. dizziness/HA/SOB/chest pain
Pt alert and responsive, no signs of acute distress, denies pain or discomfort at this time

## 2022-12-13 NOTE — CONSULT NOTE ADULT - SUBJECTIVE AND OBJECTIVE BOX
Date of Admission:  12/12/22    CHIEF COMPLAINT:  presented to HD center, found to have HGB of 7    HISTORY OF PRESENT ILLNESS:  81 yr old male with a pmh of HTN, HLD, T2DM on insulin< ESRD on HD MWF (started HD last week this was his 3rd session). Pt presented for HD and had lab work drawn where he was fdund to have an Hb of 7 and was sent to the ED. He did not have HD today.  Rectal: declined - stated "if I have no bleeding why do I need it" even after explaining to pt why it is necessary he still declined.  Denies  headache, dizziness, chest pain, palpitations, SOB, abdominal pain, joint pain, diarrhea/constipation, melena/BRPR urinary symptoms.     Cardiology called for elevated troponin and EKG with TWI, different from previous EKG.    NKDA    MEDICATIONS:  carvedilol 12.5 milliGRAM(s) Oral every 12 hours  NIFEdipine  milliGRAM(s) Oral daily  acetaminophen     Tablet .. 650 milliGRAM(s) Oral every 6 hours PRN  melatonin 3 milliGRAM(s) Oral at bedtime PRN  ondansetron Injectable 4 milliGRAM(s) IV Push every 8 hours PRN  aluminum hydroxide/magnesium hydroxide/simethicone Suspension 30 milliLiter(s) Oral every 4 hours PRN  atorvastatin 20 milliGRAM(s) Oral at bedtime  dextrose 50% Injectable 25 Gram(s) IV Push once  dextrose 50% Injectable 12.5 Gram(s) IV Push once  dextrose 50% Injectable 25 Gram(s) IV Push once  dextrose Oral Gel 15 Gram(s) Oral once PRN  glucagon  Injectable 1 milliGRAM(s) IntraMuscular once  insulin lispro (ADMELOG) corrective regimen sliding scale   SubCutaneous three times a day before meals  insulin lispro (ADMELOG) corrective regimen sliding scale   SubCutaneous at bedtime  calcium acetate 1334 milliGRAM(s) Oral four times a day with meals  dextrose 5%. 1000 milliLiter(s) IV Continuous <Continuous>  dextrose 5%. 1000 milliLiter(s) IV Continuous <Continuous>  sodium bicarbonate 1300 milliGRAM(s) Oral two times a day      PAST MEDICAL & SURGICAL HISTORY:  Diabetes  Benign essential HTN  HLD (hyperlipidemia)  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    No significant past surgical history    FAMILY HISTORY:  FHx: diabetes mellitus (Father, Aunt)    SOCIAL HISTORY:    [ ] Non-smoker  [ ] Smoker  [ ] Alcohol      REVIEW OF SYSTEMS:  See HPI. Otherwise, 10 point ROS done and otherwise negative.      T(C): 36.1 (12-12-22 @ 23:15), Max: 37 (12-12-22 @ 19:24)  HR: 59 (12-12-22 @ 23:15) (54 - 61)  BP: 168/80 (12-12-22 @ 23:15) (162/60 - 168/80)  RR: 14 (12-12-22 @ 23:15) (14 - 18)  SpO2: 100% (12-12-22 @ 23:15) (99% - 100%)  Wt(kg): --  I&O's Summary      Physical Exam:  General: NAD  Cardiovascular: Normal S1 S2, No JVD, No murmurs, No edema  Respiratory: Lungs clear to auscultation	  Gastrointestinal:  Soft, Non-tender, + BS	  Skin: warm and dry, No rashes, No ecchymoses, No cyanosis	  Extremities:  No clubbing, cyanosis or edema  Vascular: Peripheral pulses palpable 2+ bilaterally    LABS:	   	    CBC Full  -  ( 12 Dec 2022 21:16 )  WBC Count : 6.46 K/uL  Hemoglobin : 6.8 g/dL  Hematocrit : 23.5 %  Platelet Count - Automated : 232 K/uL  Mean Cell Volume : 67.5 fL  Mean Cell Hemoglobin : 19.5 pg  Mean Cell Hemoglobin Concentration : 28.9 gm/dL  Auto Neutrophil # : 5.03 K/uL  Auto Lymphocyte # : 0.73 K/uL  Auto Monocyte # : 0.53 K/uL  Auto Eosinophil # : 0.13 K/uL  Auto Basophil # : 0.03 K/uL  Auto Neutrophil % : 77.8 %  Auto Lymphocyte % : 11.3 %  Auto Monocyte % : 8.2 %  Auto Eosinophil % : 2.0 %  Auto Basophil % : 0.5 %    12-12    135  |  101  |  27<H>  ----------------------------<  146<H>  5.0   |  23  |  5.37<H>    Ca    8.4      12 Dec 2022 21:15  Phos  4.2     12-12  Mg     2.10     12-12    TPro  5.5<L>  /  Alb  3.0<L>  /  TBili  0.4  /  DBili  x   /  AST  14  /  ALT  <5<L>  /  AlkPhos  79  12-12    < from: Transthoracic Echocardiogram (08.31.22 @ 07:30) >    Patient name: JIMMY BLAND  YOB: 1950   Age: 71 (M)   MR#: [id number]  Study Date: 8/31/2022  Location: [id number]: RYANNE Khan  Study quality: Technically good  Referring Physician: Laurie Wheatley MD  Blood Pressure: 155/76 mmHg  Height: 172 cm  Weight: 69 kg  BSA: 1.8 m2  ------------------------------------------------------------------------  PROCEDURE: Transthoracic echocardiogram with 2-D, M-Mode  and complete spectral and color flow Doppler.  INDICATION: Cerebral infarction, unspecified (I63.9)  ------------------------------------------------------------------------  DIMENSIONS:  Dimensions:     Normal Values:  LA:     3.8 cm    2.0 - 4.0 cm  Ao:     3.5 cm    2.0 - 3.8 cm  SEPTUM: 1.2 cm    0.6 - 1.2 cm  PWT:    1.2 cm    0.6 - 1.1 cm  LVIDd:  4.8 cm    3.0 - 5.6 cm  LVIDs:  3.1 cm    1.8 - 4.0 cm  Derived Variables:  LVMI: 120 g/m2  RWT: 0.50  Fractional short: 35 %  Ejection Fraction (Modified Tian Rule): 64 %  ------------------------------------------------------------------------  OBSERVATIONS:  Mitral Valve: Mitral annular calcification, otherwise  normal mitral valve. Minimal mitral regurgitation.  Aortic Root: Normal size aortic root. (Ao:3.5 cm).  Aortic Valve: Calcified trileaflet aortic valve with normal  opening. Minimal aortic regurgitation.  Left Atrium: Mildly dilated left atrium.  LA volume index =  41 cc/m2.  Left Ventricle: Normal left ventricular systolic function.  No segmental wall motion abnormalities. Mild concentric  left ventricular hypertrophy.  Right Heart: Normal right atrium. Normal right ventricular  size and function. Normal tricuspid valve.  Minimal  tricuspid regurgitation. Normal pulmonic valve. Minimal  pulmonic regurgitation.  Pericardium/PleuraNormal pericardium with no pericardial  effusion.  ------------------------------------------------------------------------  CONCLUSIONS:  1. Mitral annular calcification, otherwise normal mitral  valve. Minimal mitral regurgitation.  2. Mildly dilated left atrium.  LA volume index = 41 cc/m2.  3. Mild concentric left ventricular hypertrophy.  4. Normal left ventricular systolic function. No segmental  wall motion abnormalities.  5. Normal right ventricular size and function.  No obvious cardiac source of embolus was identified on this  transthoracic study.  If clinical suspicion is high,  consider KOBE for further evaluation.  ------------------------------------------------------------------------  Confirmed on  8/31/2022 - 08:23:00 by Adrian Saavedra M.D.,  [facility], [facility]  ------------------------------------------------------------------------    < end of copied text >

## 2022-12-13 NOTE — H&P ADULT - NSHPREVIEWOFSYSTEMS_GEN_ALL_CORE
REVIEW OF SYSTEMS:    CONSTITUTIONAL: No weakness, fevers or chills  EYES/ENT: No visual changes;  No dysphagia; No sore throat; No rhinorrhea; No sinus pain/pressure  NECK: No pain or stiffness  RESPIRATORY: No cough, wheezing, hemoptysis; No shortness of breath  CARDIOVASCULAR: No chest pain or palpitations; No lower extremity edema  GASTROINTESTINAL: No abdominal or epigastric pain. No nausea, vomiting, or hematemesis; No diarrhea or constipation. No melena or hematochezia.  GENITOURINARY: No dysuria, frequency or hematuria  NEUROLOGICAL: No numbness or weakness  MSK: ambulates without assistance   SKIN: No itching, burning, rashes, or lesions   All other review of systems is negative unless indicated above.

## 2022-12-13 NOTE — PHYSICAL THERAPY INITIAL EVALUATION ADULT - PERTINENT HX OF CURRENT PROBLEM, REHAB EVAL
Pt is a 81 year old male with a PMH of HTN, HLD, T2DM on insulin< ESRD on HD MWF. Pt presented to ED after lab work drawn showed Hb of 7. Pt admitted for Anemia.

## 2022-12-13 NOTE — H&P ADULT - PROBLEM SELECTOR PLAN 1
Acute on Chronic   Hb 7 at HD and 6.8 on presentation  Baseline 8-9  Baseline Hb 8-9  Pt declining CANDE  s/p 1 unit PRBC   Iron studies ordered  Consider GI consult in AM pending above results

## 2022-12-13 NOTE — CONSULT NOTE ADULT - NS ATTEND AMEND GEN_ALL_CORE FT
patient is without anginal symptoms  troponin in the setting of new ESRD, ECG without acute ischemic ST changes  not ACS
ESRD HD as tolerated.

## 2022-12-13 NOTE — PATIENT PROFILE ADULT - FUNCTIONAL ASSESSMENT - DAILY ACTIVITY SECTION LABEL
A Stent Synergy Xd 3x20mm Mr was deployed in the right coronary artery.   The stent was deployed at 11 agustín for 15 seconds at 11/10/2022 1:42 PM .  .

## 2022-12-13 NOTE — H&P ADULT - NSICDXPASTMEDICALHX_GEN_ALL_CORE_FT
PAST MEDICAL HISTORY:  Benign essential HTN     Diabetes     HLD (hyperlipidemia)     Stage 5 chronic kidney disease not on chronic dialysis     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

## 2022-12-13 NOTE — H&P ADULT - PROBLEM SELECTOR PLAN 4
Chronic moderate exacerbation    Pt not sure of home dose  Per note on 11/29/22 pt on Novolin 14units TID- discussed with pharmacy Chronic moderate exacerbation   /80  Continue nifedipine 120mg daily and coreg 12.5 BID  Holding lasix 40mg BID as euvolemic on exam and hydralazine 100mg TID in setting of low Hb- restart as appropriate  Monitor Chronic moderate exacerbation   /80  Continue nifedipine 120mg daily, hydralazine 100mg TID and coreg 12.5 BID with hold parameters  Holding lasix 40mg BID as euvolemic on exam restart as appropriate  Monitor

## 2022-12-13 NOTE — H&P ADULT - PROBLEM SELECTOR PLAN 3
New  EKG interpreted by myself: with TWI II, III, AVF and Twave changes in V2  Trop 115->104, Denies CP  ECHO 8/2022: EF 64%  Cardiology called for consult

## 2022-12-13 NOTE — H&P ADULT - PROBLEM SELECTOR PLAN 5
Chronic stable  Continue atorvastatin  Lipid panel in AM Chronic moderate exacerbation    Pt not sure of home dose  Per note on 11/29/22 pt on Novolin 14units TID- discussed with pharmacy Chronic moderate exacerbation    Pt not sure of home dose  Per note on 11/29/22 pt on Novolin 14units TID- discussed with pharmacy who advised to start LDCS and to monitor for the time being  A1c and lipid panel in AM

## 2022-12-14 ENCOUNTER — TRANSCRIPTION ENCOUNTER (OUTPATIENT)
Age: 72
End: 2022-12-14

## 2022-12-14 VITALS
TEMPERATURE: 98 F | SYSTOLIC BLOOD PRESSURE: 139 MMHG | HEART RATE: 63 BPM | OXYGEN SATURATION: 100 % | RESPIRATION RATE: 16 BRPM | DIASTOLIC BLOOD PRESSURE: 67 MMHG

## 2022-12-14 DIAGNOSIS — Z29.9 ENCOUNTER FOR PROPHYLACTIC MEASURES, UNSPECIFIED: ICD-10-CM

## 2022-12-14 LAB
ANION GAP SERPL CALC-SCNC: 12 MMOL/L — SIGNIFICANT CHANGE UP (ref 7–14)
BASOPHILS # BLD AUTO: 0.02 K/UL — SIGNIFICANT CHANGE UP (ref 0–0.2)
BASOPHILS NFR BLD AUTO: 0.4 % — SIGNIFICANT CHANGE UP (ref 0–2)
BUN SERPL-MCNC: 16 MG/DL — SIGNIFICANT CHANGE UP (ref 7–23)
CALCIUM SERPL-MCNC: 8 MG/DL — LOW (ref 8.4–10.5)
CHLORIDE SERPL-SCNC: 99 MMOL/L — SIGNIFICANT CHANGE UP (ref 98–107)
CO2 SERPL-SCNC: 26 MMOL/L — SIGNIFICANT CHANGE UP (ref 22–31)
CREAT SERPL-MCNC: 3.41 MG/DL — HIGH (ref 0.5–1.3)
EGFR: 19 ML/MIN/1.73M2 — LOW
EOSINOPHIL # BLD AUTO: 0.24 K/UL — SIGNIFICANT CHANGE UP (ref 0–0.5)
EOSINOPHIL NFR BLD AUTO: 4.7 % — SIGNIFICANT CHANGE UP (ref 0–6)
FOLATE SERPL-MCNC: 8.6 NG/ML — SIGNIFICANT CHANGE UP (ref 3.1–17.5)
GLUCOSE BLDC GLUCOMTR-MCNC: 107 MG/DL — HIGH (ref 70–99)
GLUCOSE BLDC GLUCOMTR-MCNC: 134 MG/DL — HIGH (ref 70–99)
GLUCOSE BLDC GLUCOMTR-MCNC: 137 MG/DL — HIGH (ref 70–99)
GLUCOSE BLDC GLUCOMTR-MCNC: 228 MG/DL — HIGH (ref 70–99)
GLUCOSE SERPL-MCNC: 116 MG/DL — HIGH (ref 70–99)
HBV CORE AB SER-ACNC: SIGNIFICANT CHANGE UP
HBV SURFACE AB SER-ACNC: <3 MIU/ML — LOW
HBV SURFACE AG SER-ACNC: SIGNIFICANT CHANGE UP
HCT VFR BLD CALC: 28.3 % — LOW (ref 39–50)
HCV AB S/CO SERPL IA: 0.06 S/CO — SIGNIFICANT CHANGE UP (ref 0–0.99)
HCV AB SERPL-IMP: SIGNIFICANT CHANGE UP
HGB BLD-MCNC: 8.6 G/DL — LOW (ref 13–17)
IANC: 3.5 K/UL — SIGNIFICANT CHANGE UP (ref 1.8–7.4)
IMM GRANULOCYTES NFR BLD AUTO: 0.2 % — SIGNIFICANT CHANGE UP (ref 0–0.9)
LYMPHOCYTES # BLD AUTO: 0.96 K/UL — LOW (ref 1–3.3)
LYMPHOCYTES # BLD AUTO: 18.9 % — SIGNIFICANT CHANGE UP (ref 13–44)
MAGNESIUM SERPL-MCNC: 2 MG/DL — SIGNIFICANT CHANGE UP (ref 1.6–2.6)
MCHC RBC-ENTMCNC: 20.5 PG — LOW (ref 27–34)
MCHC RBC-ENTMCNC: 30.4 GM/DL — LOW (ref 32–36)
MCV RBC AUTO: 67.5 FL — LOW (ref 80–100)
MONOCYTES # BLD AUTO: 0.35 K/UL — SIGNIFICANT CHANGE UP (ref 0–0.9)
MONOCYTES NFR BLD AUTO: 6.9 % — SIGNIFICANT CHANGE UP (ref 2–14)
NEUTROPHILS # BLD AUTO: 3.5 K/UL — SIGNIFICANT CHANGE UP (ref 1.8–7.4)
NEUTROPHILS NFR BLD AUTO: 68.9 % — SIGNIFICANT CHANGE UP (ref 43–77)
NRBC # BLD: 0 /100 WBCS — SIGNIFICANT CHANGE UP (ref 0–0)
NRBC # FLD: 0 K/UL — SIGNIFICANT CHANGE UP (ref 0–0)
PHOSPHATE SERPL-MCNC: 3.4 MG/DL — SIGNIFICANT CHANGE UP (ref 2.5–4.5)
PLATELET # BLD AUTO: 213 K/UL — SIGNIFICANT CHANGE UP (ref 150–400)
POTASSIUM SERPL-MCNC: 4 MMOL/L — SIGNIFICANT CHANGE UP (ref 3.5–5.3)
POTASSIUM SERPL-SCNC: 4 MMOL/L — SIGNIFICANT CHANGE UP (ref 3.5–5.3)
PTH-INTACT FLD-MCNC: 93 PG/ML — HIGH (ref 15–65)
RBC # BLD: 4.19 M/UL — LOW (ref 4.2–5.8)
RBC # FLD: 17.3 % — HIGH (ref 10.3–14.5)
SODIUM SERPL-SCNC: 137 MMOL/L — SIGNIFICANT CHANGE UP (ref 135–145)
VIT B12 SERPL-MCNC: 464 PG/ML — SIGNIFICANT CHANGE UP (ref 200–900)
WBC # BLD: 5.08 K/UL — SIGNIFICANT CHANGE UP (ref 3.8–10.5)
WBC # FLD AUTO: 5.08 K/UL — SIGNIFICANT CHANGE UP (ref 3.8–10.5)

## 2022-12-14 PROCEDURE — 99232 SBSQ HOSP IP/OBS MODERATE 35: CPT

## 2022-12-14 PROCEDURE — 99239 HOSP IP/OBS DSCHRG MGMT >30: CPT | Mod: GC

## 2022-12-14 RX ORDER — ERYTHROPOIETIN 10000 [IU]/ML
1 INJECTION, SOLUTION INTRAVENOUS; SUBCUTANEOUS
Qty: 1 | Refills: 0
Start: 2022-12-14

## 2022-12-14 RX ADMIN — CHLORHEXIDINE GLUCONATE 1 APPLICATION(S): 213 SOLUTION TOPICAL at 12:03

## 2022-12-14 RX ADMIN — Medication 1334 MILLIGRAM(S): at 09:33

## 2022-12-14 RX ADMIN — CARVEDILOL PHOSPHATE 12.5 MILLIGRAM(S): 80 CAPSULE, EXTENDED RELEASE ORAL at 12:02

## 2022-12-14 RX ADMIN — Medication 100 MILLIGRAM(S): at 05:22

## 2022-12-14 RX ADMIN — Medication 120 MILLIGRAM(S): at 05:22

## 2022-12-14 RX ADMIN — Medication 1300 MILLIGRAM(S): at 09:33

## 2022-12-14 NOTE — PROGRESS NOTE ADULT - SUBJECTIVE AND OBJECTIVE BOX
[facility] Progress Note    Interval Events:  s/p HD and PRBC x 1  no new symptoms      MEDICATIONS:  carvedilol 12.5 milliGRAM(s) Oral every 12 hours  hydrALAZINE 100 milliGRAM(s) Oral three times a day  NIFEdipine  milliGRAM(s) Oral daily  acetaminophen     Tablet .. 650 milliGRAM(s) Oral every 6 hours PRN  melatonin 3 milliGRAM(s) Oral at bedtime PRN  ondansetron Injectable 4 milliGRAM(s) IV Push every 8 hours PRN  aluminum hydroxide/magnesium hydroxide/simethicone Suspension 30 milliLiter(s) Oral every 4 hours PRN  atorvastatin 20 milliGRAM(s) Oral at bedtime  dextrose 50% Injectable 25 Gram(s) IV Push once  dextrose 50% Injectable 12.5 Gram(s) IV Push once  dextrose 50% Injectable 25 Gram(s) IV Push once  dextrose Oral Gel 15 Gram(s) Oral once PRN  glucagon  Injectable 1 milliGRAM(s) IntraMuscular once  insulin lispro (ADMELOG) corrective regimen sliding scale   SubCutaneous three times a day before meals  insulin lispro (ADMELOG) corrective regimen sliding scale   SubCutaneous at bedtime    calcium acetate 1334 milliGRAM(s) Oral two times a day with meals  chlorhexidine 2% Cloths 1 Application(s) Topical daily  dextrose 5%. 1000 milliLiter(s) IV Continuous <Continuous>  dextrose 5%. 1000 milliLiter(s) IV Continuous <Continuous>  sodium bicarbonate 1300 milliGRAM(s) Oral two times a day  sodium chloride 0.9% Bolus. 100 milliLiter(s) IV Bolus every 5 minutes PRN    PHYSICAL EXAM:  T(C): 36.4 (12-14-22 @ 11:59), Max: 36.8 (12-13-22 @ 19:58)  HR: 59 (12-14-22 @ 11:59) (54 - 64)  BP: 144/51 (12-14-22 @ 11:59) (143/56 - 164/80)  RR: 17 (12-14-22 @ 11:59) (16 - 18)  SpO2: 100% (12-14-22 @ 11:59) (97% - 100%)  Wt(kg): --  I&O's Summary    13 Dec 2022 07:01  -  14 Dec 2022 07:00  --------------------------------------------------------  IN: 400 mL / OUT: 1400 mL / NET: -1000 mL      Appearance: No acute distress  HEENT:   Normal oral mucosa, PERRL  Cardiovascular: Normal S1 S2, no elevated JVP, no murmurs, no edema  Respiratory: Lungs clear to auscultation	, good air movement  Psychiatry: A & O x 3, Mood & affect appropriate  Gastrointestinal:  soft nt  Skin: no cyanosis	  Neurologic: grossly non-focal  Extremities: Normal range of motion, no clubbing, cyanosis or edema    LABS:	 	  CBC Full  -  ( 14 Dec 2022 06:15 )  WBC Count : 5.08 K/uL  Hemoglobin : 8.6 g/dL  Hematocrit : 28.3 %  Platelet Count - Automated : 213 K/uL    12-14  137  |  99  |  16  ----------------------------<  116<H>  4.0   |  26  |  3.41<H>    12-13  136  |  100  |  28<H>  ----------------------------<  92  4.4   |  23  |  5.40<H>    Ca    8.0<L>      14 Dec 2022 06:15  Ca    8.3<L>      13 Dec 2022 05:50  Phos  3.4     12-14  Phos  4.2     12-12  Mg     2.00     12-14  Mg     2.10     12-12    TPro  5.5<L>  /  Alb  3.0<L>  /  TBili  0.4  /  DBili  x   /  AST  14  /  ALT  <5<L>  /  AlkPhos  79  12-12    proBNP:   Lipid Profile:   HgA1c:   TSH: Thyroid Stimulating Hormone, Serum: 5.16 uIU/mL (12-13 @ 05:50)    CARDIAC MARKERS:

## 2022-12-14 NOTE — DISCHARGE NOTE NURSING/CASE MANAGEMENT/SOCIAL WORK - NSDCPNINST_GEN_ALL_CORE
Patient alert and oriented x 4. Skin intact. Denies discomfort/pain. Patient alert and oriented x 4. Skin intact. Denies discomfort/pain. Right arm precaution with right AV fistula. Dry and intact.

## 2022-12-14 NOTE — PROGRESS NOTE ADULT - PROBLEM SELECTOR PLAN 7
DVT PPx: SCDs given possible c/f bleed  Diet: CC/DASH DVT PPx: SCDs given possible c/f bleed  Diet: CC/DASH  dispo: d/c today

## 2022-12-14 NOTE — DISCHARGE NOTE NURSING/CASE MANAGEMENT/SOCIAL WORK - PATIENT PORTAL LINK FT
You can access the FollowMyHealth Patient Portal offered by [facility] by registering at the following website: [email or web address]. By joining [employer]’s FollowMyHealth portal, you will also be able to view your health information using other applications (apps) compatible with our system.

## 2022-12-14 NOTE — PROGRESS NOTE ADULT - SUBJECTIVE AND OBJECTIVE BOX
***************************************************************  Bowen Thao, PGY2  Internal Medicine   NS pager: [phone]  [facility] pager: [phone]  ***************************************************************        JIMMY BLAND  72y  Male      Patient is a 72y old  Male who presents with a chief complaint of Anemia (13 Dec 2022 13:34)      INTERVAL HPI/OVERNIGHT EVENTS:       FAMILY HISTORY:  FHx: diabetes mellitus (Father, Aunt)      T(C): 36.7 (12-14-22 @ 05:22), Max: 36.8 (12-13-22 @ 19:58)  HR: 55 (12-14-22 @ 05:22) (54 - 64)  BP: 148/64 (12-14-22 @ 05:22) (143/56 - 164/80)  RR: 16 (12-14-22 @ 05:22) (16 - 18)  SpO2: 98% (12-14-22 @ 05:22) (97% - 100%)  Wt(kg): --Vital Signs Last 24 Hrs  T(C): 36.7 (14 Dec 2022 05:22), Max: 36.8 (13 Dec 2022 19:58)  T(F): 98.1 (14 Dec 2022 05:22), Max: 98.2 (13 Dec 2022 19:58)  HR: 55 (14 Dec 2022 05:22) (54 - 64)  BP: 148/64 (14 Dec 2022 05:22) (143/56 - 164/80)  BP(mean): --  RR: 16 (14 Dec 2022 05:22) (16 - 18)  SpO2: 98% (14 Dec 2022 05:22) (97% - 100%)    Parameters below as of 14 Dec 2022 05:22  Patient On (Oxygen Delivery Method): room air      No Known Allergies      PHYSICAL EXAM:  GENERAL: NAD, laying comfortably in bed  HEAD:  Atraumatic, Normocephalic  EYES: EOMI, PERRLA, conjunctiva and sclera clear  ENMT: No tonsillar erythema, exudates, or enlargement; Moist mucous membranes  NECK: Supple, No JVD  NERVOUS SYSTEM:  Alert & Oriented X3, Good concentration; Motor Strength grossly intact in B/L upper and lower extremities;   CHEST/LUNG: Clear to auscultation bilaterally; No rales, rhonchi, wheezing, or rubs  HEART: Regular rate and rhythm; No murmurs, rubs, or gallops  ABDOMEN: Soft, Nontender, Nondistended; Bowel sounds present  EXTREMITIES:  No clubbing, cyanosis, or edema  LYMPH: No lymphadenopathy noted  SKIN: No rashes or lesions        LABS:                            8.3    5.22  )-----------( 216      ( 13 Dec 2022 05:50 )             27.2       12-13    136  |  100  |  28<H>  ----------------------------<  92  4.4   |  23  |  5.40<H>    Ca    8.3<L>      13 Dec 2022 05:50  Phos  4.2     12-12  Mg     2.10     12-12    TPro  5.5<L>  /  Alb  3.0<L>  /  TBili  0.4  /  DBili  x   /  AST  14  /  ALT  <5<L>  /  AlkPhos  79  12-12                      CARDIAC MARKERS ( 12 Dec 2022 21:15 )  x     / x     / 61 U/L / x     / 1.7 ng/mL        CAPILLARY BLOOD GLUCOSE      POCT Blood Glucose.: 135 mg/dL (13 Dec 2022 21:56)                RADIOLOGY & ADDITIONAL TESTS:      acetaminophen     Tablet .. 650 milliGRAM(s) Oral every 6 hours PRN  aluminum hydroxide/magnesium hydroxide/simethicone Suspension 30 milliLiter(s) Oral every 4 hours PRN  atorvastatin 20 milliGRAM(s) Oral at bedtime  calcium acetate 1334 milliGRAM(s) Oral two times a day with meals  carvedilol 12.5 milliGRAM(s) Oral every 12 hours  chlorhexidine 2% Cloths 1 Application(s) Topical daily  dextrose 5%. 1000 milliLiter(s) IV Continuous <Continuous>  dextrose 5%. 1000 milliLiter(s) IV Continuous <Continuous>  dextrose 50% Injectable 25 Gram(s) IV Push once  dextrose 50% Injectable 12.5 Gram(s) IV Push once  dextrose 50% Injectable 25 Gram(s) IV Push once  dextrose Oral Gel 15 Gram(s) Oral once PRN  glucagon  Injectable 1 milliGRAM(s) IntraMuscular once  hydrALAZINE 100 milliGRAM(s) Oral three times a day  insulin lispro (ADMELOG) corrective regimen sliding scale   SubCutaneous three times a day before meals  insulin lispro (ADMELOG) corrective regimen sliding scale   SubCutaneous at bedtime  melatonin 3 milliGRAM(s) Oral at bedtime PRN  NIFEdipine  milliGRAM(s) Oral daily  ondansetron Injectable 4 milliGRAM(s) IV Push every 8 hours PRN  sodium bicarbonate 1300 milliGRAM(s) Oral two times a day  sodium chloride 0.9% Bolus. 100 milliLiter(s) IV Bolus every 5 minutes PRN      HEALTH ISSUES - PROBLEM Dx:  Anemia    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    Type 2 diabetes mellitus treated with insulin    Benign essential HTN    HLD (hyperlipidemia)    Abnormal EKG           ***************************************************************  Bowen Thao, PGY2  Internal Medicine   NS pager: [phone]  [facility] pager: [phone]  ***************************************************************        JIMMY BLAND  72y  Male      Patient is a 72y old  Male who presents with a chief complaint of Anemia (13 Dec 2022 13:34)      INTERVAL HPI/OVERNIGHT EVENTS: No acute events overnight. At bedside, pt had no complaints. Denied fever, chills, CP, SOB, nausea, vomiting, diarrhea, constipation, dysuria. Normal BMs, no bleeding, hematochezia, melena, no other signs of bleeding.      FAMILY HISTORY:  FHx: diabetes mellitus (Father, Aunt)      T(C): 36.7 (12-14-22 @ 05:22), Max: 36.8 (12-13-22 @ 19:58)  HR: 55 (12-14-22 @ 05:22) (54 - 64)  BP: 148/64 (12-14-22 @ 05:22) (143/56 - 164/80)  RR: 16 (12-14-22 @ 05:22) (16 - 18)  SpO2: 98% (12-14-22 @ 05:22) (97% - 100%)  Wt(kg): --Vital Signs Last 24 Hrs  T(C): 36.7 (14 Dec 2022 05:22), Max: 36.8 (13 Dec 2022 19:58)  T(F): 98.1 (14 Dec 2022 05:22), Max: 98.2 (13 Dec 2022 19:58)  HR: 55 (14 Dec 2022 05:22) (54 - 64)  BP: 148/64 (14 Dec 2022 05:22) (143/56 - 164/80)  BP(mean): --  RR: 16 (14 Dec 2022 05:22) (16 - 18)  SpO2: 98% (14 Dec 2022 05:22) (97% - 100%)    Parameters below as of 14 Dec 2022 05:22  Patient On (Oxygen Delivery Method): room air      No Known Allergies      PHYSICAL EXAM:  GENERAL: NAD, laying comfortably in bed  HEAD:  Atraumatic, Normocephalic  EYES: EOMI, PERRLA, conjunctiva and sclera clear  ENMT: No tonsillar erythema, exudates, or enlargement; Moist mucous membranes  NECK: Supple, No JVD  NERVOUS SYSTEM:  Alert & Oriented X3, Good concentration; Motor Strength grossly intact in B/L upper and lower extremities;   CHEST/LUNG: Clear to auscultation bilaterally; No rales, rhonchi, wheezing, or rubs  HEART: Regular rate and rhythm; No murmurs, rubs, or gallops  ABDOMEN: Soft, Nontender, Nondistended; Bowel sounds present  EXTREMITIES:  No clubbing, cyanosis, or edema  LYMPH: No lymphadenopathy noted  SKIN: No rashes or lesions        LABS:                            8.3    5.22  )-----------( 216      ( 13 Dec 2022 05:50 )             27.2       12-13    136  |  100  |  28<H>  ----------------------------<  92  4.4   |  23  |  5.40<H>    Ca    8.3<L>      13 Dec 2022 05:50  Phos  4.2     12-12  Mg     2.10     12-12    TPro  5.5<L>  /  Alb  3.0<L>  /  TBili  0.4  /  DBili  x   /  AST  14  /  ALT  <5<L>  /  AlkPhos  79  12-12                      CARDIAC MARKERS ( 12 Dec 2022 21:15 )  x     / x     / 61 U/L / x     / 1.7 ng/mL        CAPILLARY BLOOD GLUCOSE      POCT Blood Glucose.: 135 mg/dL (13 Dec 2022 21:56)                RADIOLOGY & ADDITIONAL TESTS:      acetaminophen     Tablet .. 650 milliGRAM(s) Oral every 6 hours PRN  aluminum hydroxide/magnesium hydroxide/simethicone Suspension 30 milliLiter(s) Oral every 4 hours PRN  atorvastatin 20 milliGRAM(s) Oral at bedtime  calcium acetate 1334 milliGRAM(s) Oral two times a day with meals  carvedilol 12.5 milliGRAM(s) Oral every 12 hours  chlorhexidine 2% Cloths 1 Application(s) Topical daily  dextrose 5%. 1000 milliLiter(s) IV Continuous <Continuous>  dextrose 5%. 1000 milliLiter(s) IV Continuous <Continuous>  dextrose 50% Injectable 25 Gram(s) IV Push once  dextrose 50% Injectable 12.5 Gram(s) IV Push once  dextrose 50% Injectable 25 Gram(s) IV Push once  dextrose Oral Gel 15 Gram(s) Oral once PRN  glucagon  Injectable 1 milliGRAM(s) IntraMuscular once  hydrALAZINE 100 milliGRAM(s) Oral three times a day  insulin lispro (ADMELOG) corrective regimen sliding scale   SubCutaneous three times a day before meals  insulin lispro (ADMELOG) corrective regimen sliding scale   SubCutaneous at bedtime  melatonin 3 milliGRAM(s) Oral at bedtime PRN  NIFEdipine  milliGRAM(s) Oral daily  ondansetron Injectable 4 milliGRAM(s) IV Push every 8 hours PRN  sodium bicarbonate 1300 milliGRAM(s) Oral two times a day  sodium chloride 0.9% Bolus. 100 milliLiter(s) IV Bolus every 5 minutes PRN      HEALTH ISSUES - PROBLEM Dx:  Anemia    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    Type 2 diabetes mellitus treated with insulin    Benign essential HTN    HLD (hyperlipidemia)    Abnormal EKG

## 2022-12-14 NOTE — DISCHARGE NOTE PROVIDER - YES NO FOR MLM POSITIVE OR NEGATIVE COVID RESULT
September 12, 2022      [facility]  [address] [address]  Phone: [phone]  Fax: [phone]       Patient: Jose Arellano   YOB: 2014  Date of Visit: 09/12/2022    To Whom It May Concern:    Gus Arellano  was at [facility] on 09/12/2022. The patient may return to work/school on 09/13/2022 with no restrictions. If you have any questions or concerns, or if I can be of further assistance, please do not hesitate to contact me.    Sincerely,    Jillian Correa MA     
,

## 2022-12-14 NOTE — DISCHARGE NOTE PROVIDER - DISCHARGE SERVICE FOR PATIENT
on the discharge service for the patient. I have reviewed and made amendments to the documentation where necessary. (1) body pink, extremities blue

## 2022-12-14 NOTE — DIETITIAN INITIAL EVALUATION ADULT - OTHER CALCULATIONS
Weight history, per chart: (12/13 post-HD) 139.9 lbs, (10/24) 132 lbs, (9/1) 139 lbs, (8/1) 148 lbs, (2/10) 150 lbs.  Ideal Body Weight: 154 lbs / 70 kg +/-10%

## 2022-12-14 NOTE — DISCHARGE NOTE PROVIDER - CARE PROVIDERS DIRECT ADDRESSES
[email or web address]

## 2022-12-14 NOTE — PROGRESS NOTE ADULT - PROBLEM SELECTOR PLAN 6
Chronic stable  Continue atorvastatin  Lipid panel in AM
-Continue atorvastatin  -LDL-C 137 on 12/13

## 2022-12-14 NOTE — PROGRESS NOTE ADULT - NS ATTEND AMEND GEN_ALL_CORE FT
Discontinue the bicarbonate. No longer needed as he is on dialysis. Ok for discharge. Getting short 2 hour of HD today and will resume his outpatient schedule. Spoke to Resident.

## 2022-12-14 NOTE — DISCHARGE NOTE PROVIDER - CARE PROVIDER_API CALL
Anabel Vasquez (DO)  Internal Medicine  [address] [place] [address]  Phone: [phone]  Fax: [phone]  Established Patient  Follow Up Time: 1-3 days    Mirela Singh (MD)  Internal Medicine  [address] [facility], [address]  [place] [address]  Phone: [phone]  Fax: [phone]  Follow Up Time: 1-3 days

## 2022-12-14 NOTE — PROGRESS NOTE ADULT - ATTENDING COMMENTS
73 yo M with hx of HTN,  ESRD on HD (MWF) sent by HD center for acute on chronic anemia     #anemia   likely 2/2 CKD. no active bleeding  -hgb 6.8 on admission, baseline low 8s. s/p 1 unit PRBC with appropriate response   -to start epo with HD as per nephro    #ESRD on HD  -to received HD today.  last 12/9  -f/u nephro recs    #HTN  -c/w home meds- coreg, hydralazine, nifedipine. resume lasix     rest of the plan as above
73 yo M with hx of HTN,  ESRD on HD (MWF) sent by HD center for acute on chronic anemia     #anemia   likely 2/2 CKD. no active bleeding  -hgb 6.8 on admission, baseline low 8s. s/p 1 unit PRBC with appropriate response   -started epo with HD as per nephro  -f/u PCP/GI outpt for possible EGD/coloscopy r/o GIB    #ESRD on HD  -received HD 12/13. to have additional session today  -nephro recs noted     #HTN  -c/w home meds- coreg, hydralazine, nifedipine.  lasix     stable for discharge home today after HD. total time spent on dc 37min

## 2022-12-14 NOTE — DIETITIAN INITIAL EVALUATION ADULT - NS FNS DIET ORDER
Consistent Carbohydrate {Evening Snack}  DASH/TLC {Sodium & Cholesterol Restricted}  1200mL Fluid Restriction  Renal Replacement {No Protein Restr, No Conc K, No Conc Phos, Low Sodium}

## 2022-12-14 NOTE — DISCHARGE NOTE PROVIDER - NSDCMRMEDTOKEN_GEN_ALL_CORE_FT
atorvastatin 20 mg oral tablet: 1 tab(s) orally once a day  calcium acetate 667 mg oral tablet: 2 tab(s) orally 2 times a day   carvedilol 12.5 mg oral tablet: 1 tab(s) orally every 12 hours  furosemide 40 mg oral tablet: 1 tab(s) orally 2 times a day  hydrALAZINE 100 mg oral tablet: 1 tab(s) orally 3 times a day  NIFEdipine 60 mg oral tablet, extended release: 2 tab(s) orally once a day  RELION NOVOLIN N FLEXPEN INJ: 14units 3 times a day with meals  Retacrit 10,000 units/mL injectable solution: 1 units/kg intravenously Monday, Wednesday, and Friday

## 2022-12-14 NOTE — PROGRESS NOTE ADULT - PROBLEM SELECTOR PLAN 5
Addended by: Candance Olmsted on: 4/29/2022 11:00 AM     Modules accepted: Orders Pt on Novolin 14units TID at home    -FSGs currently at goal, on ISS and only received minimal supplementary insulin  -A1c 6.5% on 12/13

## 2022-12-14 NOTE — DISCHARGE NOTE PROVIDER - NSDCFUADDAPPT_GEN_ALL_CORE_FT
Please follow up with your Nephrologist and PCP 1-3 days after discharge to follow up on your hemoglobin levels and possibly get a screening colonoscopy.

## 2022-12-14 NOTE — DIETITIAN INITIAL EVALUATION ADULT - ADD RECOMMEND
1) Recommend consistent carbohydrate renal diet. Fluid restriction per medical discretion. Will note lacto-vegetarian preference within  system.  2) Offered to provide Nepro shake, pt declines.  3) Offered to provide nutrition education, pt declines.  4) Obtain pre/post-HD weights.

## 2022-12-14 NOTE — PROGRESS NOTE ADULT - PROBLEM SELECTOR PLAN 4
Chronic moderate exacerbation   /80  Continue nifedipine 120mg daily, hydralazine 100mg TID and coreg 12.5 BID with hold parameters  Holding lasix 40mg BID as euvolemic on exam restart as appropriate  Monitor
-Continue home nifedipine 120mg daily, hydralazine 100mg TID and coreg 12.5 BID with hold parameters  -Holding Lasix 40mg BID for now, will monitor BPs and restart if needed

## 2022-12-14 NOTE — PROGRESS NOTE ADULT - PROBLEM SELECTOR PLAN 2
Nephrology consulted, follows with Dr. Franklin, covered by Dr. Quezada    -s/p HD on 12/13, 1L removed  -started on retacrit  -c/w sodium bicarb 1300 mg BID and calcium acetate 1334 mg BID Nephrology consulted, follows with Dr. Franklin, covered by Dr. Quezada    -s/p HD on 12/13 and 12/14, 1L removed  -started on retacrit  -c/w sodium bicarb 1300 mg BID and calcium acetate 1334 mg BID

## 2022-12-14 NOTE — DIETITIAN INITIAL EVALUATION ADULT - PERTINENT MEDS FT
atorvastatin, calcium acetate, ADMELOG corrective regimen sliding scale, sodium bicarbonate, aluminum hydroxide/magnesium hydroxide/simethicone Suspension PRN, ondansetron IV PRN

## 2022-12-14 NOTE — PROGRESS NOTE ADULT - SUBJECTIVE AND OBJECTIVE BOX
[facility]   MD ELIANE BHAGAT MD KRISTINE SOLTANPOUR, DO ANGELA WONG, PA    TEL:  OFFICE: [phone]  From 5pm-7am Answering Service [phone]    -- RENAL FOLLOW UP NOTE ---Date of Service 12-14-22 @ 12:38    Patient is a 72y old  Male who presents with a chief complaint of Anemia (14 Dec 2022 11:42)      Patient seen and examined at bedside. No chest pain/sob    VITALS:  T(F): 97.5 (12-14-22 @ 11:59), Max: 98.2 (12-13-22 @ 19:58)  HR: 59 (12-14-22 @ 11:59)  BP: 144/51 (12-14-22 @ 11:59)  RR: 17 (12-14-22 @ 11:59)  SpO2: 100% (12-14-22 @ 11:59)  Wt(kg): --    12-13 @ 07:01  -  12-14 @ 07:00  --------------------------------------------------------  IN: 400 mL / OUT: 1400 mL / NET: -1000 mL          PHYSICAL EXAM:  General: NAD  Neck: No JVD  Respiratory: CTAB, no wheezes, rales or rhonchi  Cardiovascular: S1, S2, RRR  Gastrointestinal: BS+, soft, NT/ND  Extremities: No peripheral edema    Hospital Medications:   MEDICATIONS  (STANDING):  atorvastatin 20 milliGRAM(s) Oral at bedtime  calcium acetate 1334 milliGRAM(s) Oral two times a day with meals  carvedilol 12.5 milliGRAM(s) Oral every 12 hours  chlorhexidine 2% Cloths 1 Application(s) Topical daily  dextrose 5%. 1000 milliLiter(s) (100 mL/Hr) IV Continuous <Continuous>  dextrose 5%. 1000 milliLiter(s) (50 mL/Hr) IV Continuous <Continuous>  dextrose 50% Injectable 25 Gram(s) IV Push once  dextrose 50% Injectable 12.5 Gram(s) IV Push once  dextrose 50% Injectable 25 Gram(s) IV Push once  glucagon  Injectable 1 milliGRAM(s) IntraMuscular once  hydrALAZINE 100 milliGRAM(s) Oral three times a day  insulin lispro (ADMELOG) corrective regimen sliding scale   SubCutaneous three times a day before meals  insulin lispro (ADMELOG) corrective regimen sliding scale   SubCutaneous at bedtime  NIFEdipine  milliGRAM(s) Oral daily  sodium bicarbonate 1300 milliGRAM(s) Oral two times a day      LABS:  12-14    137  |  99  |  16  ----------------------------<  116<H>  4.0   |  26  |  3.41<H>    Ca    8.0<L>      14 Dec 2022 06:15  Phos  3.4     12-14  Mg     2.00     12-14    TPro  5.5<L>  /  Alb  3.0<L>  /  TBili  0.4  /  DBili      /  AST  14  /  ALT  <5<L>  /  AlkPhos  79  12-12    Creatinine Trend: 3.41 <--, 5.40 <--, 5.37 <--    Phosphorus Level, Serum: 3.4 mg/dL (12-14 @ 06:15)    calcium--  intact pth93  parathyroid hormone intact, serum--                            8.6    5.08  )-----------( 213      ( 14 Dec 2022 06:15 )             28.3     Urine Studies:      Iron 65, TIBC 167, %sat 39      [12-13-22 @ 05:50]  Ferritin 491      [12-13-22 @ 05:50]  PTH -- (Ca --)      [12-14-22 @ 06:15]   93  PTH -- (Ca --)      [08-26-22 @ 06:34]   86  Vitamin D (25OH) 16.1      [08-26-22 @ 06:34]  TSH 5.16      [12-13-22 @ 05:50]  Lipid: chol 216, , HDL 50, LDL --      [12-13-22 @ 05:50]    HBsAb <3.0      [12-13-22 @ 16:55]  HBsAg Nonreact      [12-13-22 @ 16:55]  HBcAb Nonreact      [12-13-22 @ 16:55]  HCV 0.06, Nonreact      [12-13-22 @ 16:55]      RADIOLOGY & ADDITIONAL STUDIES:

## 2022-12-14 NOTE — DISCHARGE NOTE PROVIDER - PROVIDER TOKENS
PROVIDER:[TOKEN:[[id number]],FOLLOWUP:[1-3 days],ESTABLISHEDPATIENT:[T]],PROVIDER:[TOKEN:[[id number]],FOLLOWUP:[1-3 days]]

## 2022-12-14 NOTE — DIETITIAN INITIAL EVALUATION ADULT - OTHER INFO
Per chart, pt is 72 year old male PMH HTN, HLD, type 2 DM, ESRD on HD presenting with anemia. Nephrology following. Pt pending discharge home today.    Pt confirms NKFA, denies difficulties chewing/swallowing. Pt lives at home with spouse, who assists with groceries/meal preparation. Pt adheres to lacto-vegetarian diet. History of ESRD on HD and type 2 DM, HbA1c (12/13) 6.5%. Pt reports using 4-5U Novolin premeal dependent on fingerstick value.     Pt reports eating well in house thus far. No noted GI distress, no bowel regimen ordered at this time. Pt continues on PhosLo. Fingersticks WDL.

## 2022-12-14 NOTE — PROGRESS NOTE ADULT - PROBLEM SELECTOR PLAN 1
Acute on Chronic, likely 2/2 ESRD given pt reports no bleeding (melena or hematochezia).  Hb 7 at HD and 6.8 on presentation, improved to ~8 s/p 1U pRBC.    -Pt declining CANDE, denies melena and hematochezia, will watch stools and trend CBC. Low concern for GI bleed clinically but will consult GI if necessary  -Iron studies wnl  -Nephro following, started on retacrit, appreciate recs Acute on Chronic, likely 2/2 ESRD given pt reports no bleeding (melena or hematochezia).  Hb 7 at HD and 6.8 on presentation, improved to ~8 s/p 1U pRBC.    -Pt declining CANDE, denies melena and hematochezia, will watch stools and trend CBC. Low concern for GI bleed clinically but will consult GI if necessary  -Iron studies wnl  -Nephro following, started on retacrit, appreciate recs  -Hgb consistently in ~8 range, stable for d/c and outpatient follow up

## 2022-12-14 NOTE — DISCHARGE NOTE PROVIDER - HOSPITAL COURSE
73 y/o M with PMH of HTN, HLD, T2DM on insulin, ESRD (HD on MWF), presenting with anemia found on outpatient labs. Hemodynamically stable, Hgb 6.8 on admission, s/p 1U pRBC with improvement to ~8. Concern for anemia 2/2 ESRD vs GI bleed. Pt refused rectal exam but denied melena and hematochezia. Hgb remained stable, started on retacrit during HD by nephrology.     Given hemodynamic stability and improvement in Hgb after transfusion and retacrit initiation and low concern for GI bleed, pt stable for d/c with outpatient follow up with nephrology and PCP for colonoscopy.

## 2022-12-14 NOTE — DIETITIAN INITIAL EVALUATION ADULT - PERTINENT LABORATORY DATA
(12/14) Na 137, BUN 16, Cr 3.41<H>, <H>, K+ 4.0, Phos 3.4, Mg 2.00  (12/13) HbA1c 6.5%<H>  POCT: (12/14) 134-228, (12/13) 112-187

## 2022-12-14 NOTE — DISCHARGE NOTE PROVIDER - NSDCCPCAREPLAN_GEN_ALL_CORE_FT
PRINCIPAL DISCHARGE DIAGNOSIS  Diagnosis: Anemia  Assessment and Plan of Treatment: You were admitted to the hospital for low hemoglobin levels. You received 1 unit of packed red blood cells which helped improve your levels. You also received injections of a medication called Retacrit during dialysis to help restore your hemoglobin levels. You reported no signs of bleeding in your stool or urine. Please continue to see your nephrologist as an outpatient and please return to the ED if you feel dizzy or notice any signs of bleeding.

## 2022-12-14 NOTE — PROGRESS NOTE ADULT - PROBLEM SELECTOR PLAN 3
Pt initially presenting with TWI II, III, AVF and T wave changes in V2, Trop 115->104, mildly elevated likely 2/2 CKD, pt denies CP.    ECHO 8/2022: EF 64%  Cardiology consulted on admission, no ACS workup, appreciate recs
New  EKG interpreted by myself: with TWI II, III, AVF and Twave changes in V2  Trop 115->104, Denies CP  ECHO 8/2022: EF 64%  Cardiology called for consult

## 2022-12-14 NOTE — PROGRESS NOTE ADULT - ASSESSMENT
81 yr old male with a pmh of HTN, HLD, T2DM on insulin, ESRD on HD MWF present with anemia    ESRD on HD MWF  via avf  last hd yesterday  hd again today to keep MWF  dc planning per team  consent in chart  renal diet    anemia  likely sec to ckd  epo with hd  transfuse to keep hb>8  monitor    htn  controlled  resume home meds  monitor    ckd-mbd  ok pth, phos level  monitor phos and calcium daily
81M HTN, HLD, DM, ESRD who presented for anemia and found to have non-ACS elevation of troponin.    clinically stable and remains without evidence of angina  no further cardiac testing indicated at this time
73 y/o M with PMH of HTN, HLD, T2DM on insulin, ESRD (HD on MWF), presenting with anemia found on outpatient labs. Concern for anemia 2/2 ESRD vs GI bleed.
72 yr old male presenting with anemia

## 2022-12-15 LAB
MRSA PCR RESULT.: SIGNIFICANT CHANGE UP
S AUREUS DNA NOSE QL NAA+PROBE: SIGNIFICANT CHANGE UP

## 2022-12-20 NOTE — PACU DISCHARGE NOTE - HYDRATION STATUS:
Satisfactory Dutasteride Pregnancy And Lactation Text: This medication is absolutely contraindicated in women, especially during pregnancy and breast feeding. Feminization of male fetuses is possible if taking while pregnant.

## 2023-02-03 ENCOUNTER — NON-APPOINTMENT (OUTPATIENT)
Age: 73
End: 2023-02-03

## 2023-02-03 PROBLEM — N18.6 END STAGE RENAL DISEASE: Chronic | Status: ACTIVE | Noted: 2022-12-13

## 2023-02-03 PROBLEM — E78.5 HYPERLIPIDEMIA, UNSPECIFIED: Chronic | Status: ACTIVE | Noted: 2022-12-13

## 2023-02-03 PROBLEM — I10 ESSENTIAL (PRIMARY) HYPERTENSION: Chronic | Status: ACTIVE | Noted: 2022-12-13

## 2023-03-28 NOTE — HISTORY OF PRESENT ILLNESS
[FreeTextEntry1] : F/u exam [de-identified] : Mr. JIMMY BLAND 71 year male with a PMH uncontrolled DM2, hyperlipidemia, vit D deficiency, CRI , anemia present to the office to f/u on a BP reading. Patient did not come to f/u for a long period of time.  Patient is accompany by his wife.  Currently he is seeing a nephrologist (was also evaluated by a transplant team) on 08/10/22. Patient did a blood test, did not see md after blood test drawn. 2 days ago went to ER due to an elevated BP reading, was given amlodipine 10mg to an addition hydralazine 100mg TID.

## 2023-03-28 NOTE — PLAN
[FreeTextEntry1] : Mr. JIMMY BLAND 71 year male with a PMH uncontrolled DM2, hyperlipidemia, vit D deficiency, CRI , anemia present to the office to f/u on a BP reading. \par BP is elevated, EKG was done and reviewed, sinus bradycardia 52 bpm, Peak T wave in lead V2-V3\par Result of the blood test discussed with the patient in detail. \par Patient has elevated creatinine, K is 6(no hemolysis), referred to ER for an evaluation, may need dialysis(has changes on ekg, elev K level)\par Has an anemia Hb is 8.3(anemia chronic disease), needs to f/u with nephrologist, hematologist. Needs most likely procrit injection\par Uncontrolled Dm2 HbA1c is 9.9, improved from 12.4 continue presend meds(f/u with endocrinolgist) Recommend  to continue insulin injection novolog. Close monitor BG by finger sticks 2 -3 times per day.\par Patient was referred to ER for an evaluation. Copy of the lab result, ekg was provided to the patient\par \par \par \par Cell phone wife Pauline [phone]

## 2023-03-30 ENCOUNTER — NON-APPOINTMENT (OUTPATIENT)
Age: 73
End: 2023-03-30

## 2023-03-30 ENCOUNTER — APPOINTMENT (OUTPATIENT)
Dept: GASTROENTEROLOGY | Facility: AMBULATORY SURGERY CENTER | Age: 73
End: 2023-03-30

## 2023-04-25 ENCOUNTER — APPOINTMENT (OUTPATIENT)
Dept: OPHTHALMOLOGY | Facility: CLINIC | Age: 73
End: 2023-04-25

## 2023-05-15 ENCOUNTER — APPOINTMENT (OUTPATIENT)
Dept: INTERNAL MEDICINE | Facility: CLINIC | Age: 73
End: 2023-05-15
Payer: MEDICARE

## 2023-05-15 VITALS
SYSTOLIC BLOOD PRESSURE: 185 MMHG | OXYGEN SATURATION: 98 % | HEIGHT: 68 IN | WEIGHT: 123 LBS | BODY MASS INDEX: 18.64 KG/M2 | HEART RATE: 72 BPM | DIASTOLIC BLOOD PRESSURE: 80 MMHG | TEMPERATURE: 97.8 F

## 2023-05-15 DIAGNOSIS — I73.9 PERIPHERAL VASCULAR DISEASE, UNSPECIFIED: ICD-10-CM

## 2023-05-15 PROCEDURE — 99214 OFFICE O/P EST MOD 30 MIN: CPT

## 2023-05-15 NOTE — REVIEW OF SYSTEMS
[Negative] : Heme/Lymph [Headache] : no headache [Dizziness] : no dizziness [Confusion] : no confusion [Unsteady Walk] : no ataxia [FreeTextEntry2] : c/o ce [de-identified] : C/o decrease memory

## 2023-05-15 NOTE — HISTORY OF PRESENT ILLNESS
[Spouse] : spouse [FreeTextEntry1] : F/u exam [de-identified] : Mr. JIMMY BLAND 72 year male with a PMH uncontrolled DM2, hyperlipidemia, vit D deficiency, CRI , anemia present to the office to f/u. Patient did not come to f/u for a long period of time. Patient is accompany by his wife. Patient feels weak, today had dialysis. As per  wife he is not compliant with meds(DM2, HTN). Do not take meds as it is prescriped. C/o decrease memory.\par Patient had  visitation from insurance company was told that he has severe PAD

## 2023-05-15 NOTE — PLAN
[FreeTextEntry1] : Mr. JIMMY BLAND 72 year male with a PMH uncontrolled DM2, hyperlipidemia, vit D deficiency, CRI , anemia present to the office to f/u\par F/u  exam is performed. Recommend  to do a blood test (will try to arrange home draw) further management will depend on the blood test results.  \par HTN is not controlled, recommend  to be compliant with meds. Risk of getting stroke is explained to the patient\par DM2 recommend  to bring a log book. Recommend to continue insulin injection novolog. Close monitor BG by finger sticks 2 -3 times per day. F/u with endo, podiatrist, ophtalmologist\par ESRD on HD, continue present meds, renal diet. F/u with nephrologisy\par For decrease memory referred to see neurologist, do blood test vit B12 level\par  RTC to f/u in 2 wks after a blood test. Patient is verbalized understanding\par

## 2023-05-25 ENCOUNTER — APPOINTMENT (OUTPATIENT)
Dept: INTERNAL MEDICINE | Facility: CLINIC | Age: 73
End: 2023-05-25

## 2023-06-01 ENCOUNTER — NON-APPOINTMENT (OUTPATIENT)
Age: 73
End: 2023-06-01

## 2023-06-01 DIAGNOSIS — R05.9 COUGH, UNSPECIFIED: ICD-10-CM

## 2023-06-01 DIAGNOSIS — N18.4 CHRONIC KIDNEY DISEASE, STAGE 4 (SEVERE): ICD-10-CM

## 2023-06-16 RX ORDER — POLYETHYLENE GLYCOL 3350 AND ELECTROLYTES WITH LEMON FLAVOR 236; 22.74; 6.74; 5.86; 2.97 G/4L; G/4L; G/4L; G/4L; G/4L
236 POWDER, FOR SOLUTION ORAL
Qty: 1 | Refills: 0 | Status: ACTIVE | COMMUNITY
Start: 2023-03-28 | End: 1900-01-01

## 2023-06-20 ENCOUNTER — APPOINTMENT (OUTPATIENT)
Dept: GASTROENTEROLOGY | Facility: AMBULATORY SURGERY CENTER | Age: 73
End: 2023-06-20
Payer: MEDICARE

## 2023-06-20 PROCEDURE — 45378 DIAGNOSTIC COLONOSCOPY: CPT

## 2023-10-31 NOTE — PRE-OP CHECKLIST - INTERNAL PROSTHESES
Name: Chip Hutchinson  : 1970  MRN: [id number]    Date of Service: 10/31/2023    Preoperative Diagnosis: Screen.                                            Postoperative Diagnosis: 1) descending polyp.                                           2) rectal polyp    Procedure Performed: Colonoscopy with forceps and snare polypectomy    Surgeon: ARA Peters MD    Anesthesia: MAC    Indication: 53 year old male desires screening exam.    Procedure Description: Patient was taken to the procedure room and placed on the stretcher in left lateral decubitus position. After adequate IV sedation was obtained, digital rectal examination was done which was unremarkable. Flexible colonoscope was introduced transanally to the cecum. The ileocecal valve and appendiceal orifice identified.     Findings:   Inspection of the colon, rectum revealed descending polyp. This was removed using hot snare polypectomy technique. Post polypectomy site revealed no evidence of active bleeding.    Also, there is small polyp at rectum. This was removed by cold forceps biopsy. Post biopsy site showed no active bleeding.     Upon retroflexion view in the rectum mild internal hemorrhoids noted. Scope was pulled out transanally.    Patient tolerated the procedure well and transferred to the recovery room in stable condition.    Recommendations:  follow up colonoscopy in 5 years pending the pathology report.      Piero Peters MD    no

## 2023-11-16 ENCOUNTER — LABORATORY RESULT (OUTPATIENT)
Age: 73
End: 2023-11-16

## 2023-11-16 ENCOUNTER — APPOINTMENT (OUTPATIENT)
Dept: INTERNAL MEDICINE | Facility: CLINIC | Age: 73
End: 2023-11-16
Payer: MEDICARE

## 2023-11-16 VITALS
WEIGHT: 116 LBS | OXYGEN SATURATION: 99 % | HEIGHT: 68 IN | SYSTOLIC BLOOD PRESSURE: 110 MMHG | TEMPERATURE: 97.8 F | RESPIRATION RATE: 15 BRPM | BODY MASS INDEX: 17.58 KG/M2 | DIASTOLIC BLOOD PRESSURE: 70 MMHG | HEART RATE: 56 BPM

## 2023-11-16 VITALS
WEIGHT: 116 LBS | DIASTOLIC BLOOD PRESSURE: 70 MMHG | HEART RATE: 56 BPM | HEIGHT: 68 IN | SYSTOLIC BLOOD PRESSURE: 138 MMHG | BODY MASS INDEX: 17.58 KG/M2 | OXYGEN SATURATION: 99 %

## 2023-11-16 DIAGNOSIS — D50.8 OTHER IRON DEFICIENCY ANEMIAS: ICD-10-CM

## 2023-11-16 DIAGNOSIS — D64.9 ANEMIA, UNSPECIFIED: ICD-10-CM

## 2023-11-16 DIAGNOSIS — R80.1 PERSISTENT PROTEINURIA, UNSPECIFIED: ICD-10-CM

## 2023-11-16 DIAGNOSIS — Z00.00 ENCOUNTER FOR GENERAL ADULT MEDICAL EXAMINATION W/OUT ABNORMAL FINDINGS: ICD-10-CM

## 2023-11-16 PROBLEM — N18.4 CKD (CHRONIC KIDNEY DISEASE), STAGE IV: Status: ACTIVE | Noted: 2022-06-13

## 2023-11-16 PROBLEM — R05.9 COUGH: Status: ACTIVE | Noted: 2023-11-16

## 2023-11-16 PROCEDURE — G0439: CPT

## 2023-11-16 PROCEDURE — 36415 COLL VENOUS BLD VENIPUNCTURE: CPT

## 2023-11-16 RX ORDER — DONEPEZIL HYDROCHLORIDE 10 MG/1
10 TABLET ORAL
Refills: 0 | Status: ACTIVE | COMMUNITY

## 2023-11-30 ENCOUNTER — NON-APPOINTMENT (OUTPATIENT)
Age: 73
End: 2023-11-30

## 2023-12-01 ENCOUNTER — NON-APPOINTMENT (OUTPATIENT)
Age: 73
End: 2023-12-01

## 2023-12-03 LAB
25(OH)D3 SERPL-MCNC: 20.8 NG/ML
ALBUMIN SERPL ELPH-MCNC: 4.3 G/DL
ALP BLD-CCNC: 135 U/L
ALT SERPL-CCNC: 17 U/L
ANACR T: NEGATIVE
ANION GAP SERPL CALC-SCNC: 15 MMOL/L
AST SERPL-CCNC: 12 U/L
BASOPHILS # BLD AUTO: 0.04 K/UL
BASOPHILS NFR BLD AUTO: 0.6 %
BILIRUB SERPL-MCNC: 0.4 MG/DL
BUN SERPL-MCNC: 26 MG/DL
CALCIUM SERPL-MCNC: 9.1 MG/DL
CHLORIDE SERPL-SCNC: 93 MMOL/L
CHOLEST SERPL-MCNC: 118 MG/DL
CO2 SERPL-SCNC: 28 MMOL/L
CREAT SERPL-MCNC: 4.46 MG/DL
EGFR: 13 ML/MIN/1.73M2
EOSINOPHIL # BLD AUTO: 0.32 K/UL
EOSINOPHIL NFR BLD AUTO: 4.7 %
ERYTHROCYTE [SEDIMENTATION RATE] IN BLOOD BY WESTERGREN METHOD: 31 MM/HR
ESTIMATED AVERAGE GLUCOSE: 292 MG/DL
GLUCOSE SERPL-MCNC: 456 MG/DL
HBA1C MFR BLD HPLC: 11.8 %
HCT VFR BLD CALC: 37.4 %
HDLC SERPL-MCNC: 54 MG/DL
HGB BLD-MCNC: 10.9 G/DL
HIV1+2 AB SPEC QL IA.RAPID: NONREACTIVE
IMM GRANULOCYTES NFR BLD AUTO: 0.3 %
LDLC SERPL CALC-MCNC: 38 MG/DL
LYMPHOCYTES # BLD AUTO: 0.85 K/UL
LYMPHOCYTES NFR BLD AUTO: 12.4 %
MAN DIFF?: NORMAL
MCHC RBC-ENTMCNC: 19.9 PG
MCHC RBC-ENTMCNC: 29.1 GM/DL
MCV RBC AUTO: 68.1 FL
MONOCYTES # BLD AUTO: 0.37 K/UL
MONOCYTES NFR BLD AUTO: 5.4 %
NEUTROPHILS # BLD AUTO: 5.26 K/UL
NEUTROPHILS NFR BLD AUTO: 76.6 %
NONHDLC SERPL-MCNC: 64 MG/DL
NT-PROBNP SERPL-MCNC: 4378 PG/ML
PLATELET # BLD AUTO: 138 K/UL
POTASSIUM SERPL-SCNC: 4.9 MMOL/L
PROT SERPL-MCNC: 6.6 G/DL
PSA FREE FLD-MCNC: 55 %
PSA FREE SERPL-MCNC: 0.6 NG/ML
PSA SERPL-MCNC: 1.08 NG/ML
RBC # BLD: 5.49 M/UL
RBC # FLD: 18 %
SODIUM SERPL-SCNC: 136 MMOL/L
TRIGL SERPL-MCNC: 152 MG/DL
TSH SERPL-ACNC: 2.03 UIU/ML
URATE SERPL-MCNC: 4.1 MG/DL
VIT B12 SERPL-MCNC: 755 PG/ML
WBC # FLD AUTO: 6.86 K/UL

## 2023-12-04 LAB
ALBUMIN MFR SERPL ELPH: 61.3 %
ALBUMIN SERPL-MCNC: 4 G/DL
ALBUMIN/GLOB SERPL: 1.5 RATIO
ALPHA1 GLOB MFR SERPL ELPH: 4.4 %
ALPHA1 GLOB SERPL ELPH-MCNC: 0.3 G/DL
ALPHA2 GLOB MFR SERPL ELPH: 10.1 %
ALPHA2 GLOB SERPL ELPH-MCNC: 0.7 G/DL
B-GLOBULIN MFR SERPL ELPH: 11.6 %
B-GLOBULIN SERPL ELPH-MCNC: 0.8 G/DL
GAMMA GLOB FLD ELPH-MCNC: 0.8 G/DL
GAMMA GLOB MFR SERPL ELPH: 12.6 %
INTERPRETATION SERPL IEP-IMP: NORMAL
PROT SERPL-MCNC: 6.6 G/DL
PROT SERPL-MCNC: 6.6 G/DL

## 2023-12-14 ENCOUNTER — NON-APPOINTMENT (OUTPATIENT)
Age: 73
End: 2023-12-14

## 2023-12-21 ENCOUNTER — APPOINTMENT (OUTPATIENT)
Dept: PODIATRY | Facility: CLINIC | Age: 73
End: 2023-12-21

## 2024-01-15 ENCOUNTER — INPATIENT (INPATIENT)
Facility: HOSPITAL | Age: 74
LOS: 3 days | Discharge: HOME HEALTH | End: 2024-01-19
Attending: INTERNAL MEDICINE | Admitting: INTERNAL MEDICINE
Payer: MEDICARE

## 2024-01-15 VITALS
RESPIRATION RATE: 18 BRPM | HEART RATE: 55 BPM | SYSTOLIC BLOOD PRESSURE: 219 MMHG | TEMPERATURE: 98 F | OXYGEN SATURATION: 99 % | DIASTOLIC BLOOD PRESSURE: 81 MMHG

## 2024-01-15 DIAGNOSIS — I77.0 ARTERIOVENOUS FISTULA, ACQUIRED: Chronic | ICD-10-CM

## 2024-01-15 DIAGNOSIS — I10 ESSENTIAL (PRIMARY) HYPERTENSION: ICD-10-CM

## 2024-01-15 LAB
A1C WITH ESTIMATED AVERAGE GLUCOSE RESULT: 14 % — HIGH (ref 4–5.6)
ALBUMIN SERPL ELPH-MCNC: 3.8 G/DL — SIGNIFICANT CHANGE UP (ref 3.3–5)
ALP SERPL-CCNC: 132 U/L — HIGH (ref 40–120)
ALT FLD-CCNC: 9 U/L — SIGNIFICANT CHANGE UP (ref 4–41)
ANION GAP SERPL CALC-SCNC: 14 MMOL/L — SIGNIFICANT CHANGE UP (ref 7–14)
ANISOCYTOSIS BLD QL: SIGNIFICANT CHANGE UP
APTT BLD: 31.6 SEC — SIGNIFICANT CHANGE UP (ref 24.5–35.6)
AST SERPL-CCNC: 26 U/L — SIGNIFICANT CHANGE UP (ref 4–40)
B-OH-BUTYR SERPL-SCNC: 0.5 MMOL/L — HIGH (ref 0–0.4)
BASE EXCESS BLDV CALC-SCNC: 2.6 MMOL/L — SIGNIFICANT CHANGE UP (ref -2–3)
BASOPHILS # BLD AUTO: 0.06 K/UL — SIGNIFICANT CHANGE UP (ref 0–0.2)
BASOPHILS NFR BLD AUTO: 0.9 % — SIGNIFICANT CHANGE UP (ref 0–2)
BILIRUB SERPL-MCNC: 0.4 MG/DL — SIGNIFICANT CHANGE UP (ref 0.2–1.2)
BLOOD GAS VENOUS COMPREHENSIVE RESULT: SIGNIFICANT CHANGE UP
BUN SERPL-MCNC: 35 MG/DL — HIGH (ref 7–23)
CALCIUM SERPL-MCNC: 9.4 MG/DL — SIGNIFICANT CHANGE UP (ref 8.4–10.5)
CHLORIDE BLDV-SCNC: 91 MMOL/L — LOW (ref 96–108)
CHLORIDE SERPL-SCNC: 91 MMOL/L — LOW (ref 98–107)
CO2 BLDV-SCNC: 32 MMOL/L — HIGH (ref 22–26)
CO2 SERPL-SCNC: 26 MMOL/L — SIGNIFICANT CHANGE UP (ref 22–31)
CREAT SERPL-MCNC: 6.08 MG/DL — HIGH (ref 0.5–1.3)
DIALYSIS INSTRUMENT RESULT - HEPATITIS B SURFACE ANTIGEN: NEGATIVE — SIGNIFICANT CHANGE UP
EGFR: 9 ML/MIN/1.73M2 — LOW
EOSINOPHIL # BLD AUTO: 0.24 K/UL — SIGNIFICANT CHANGE UP (ref 0–0.5)
EOSINOPHIL NFR BLD AUTO: 3.4 % — SIGNIFICANT CHANGE UP (ref 0–6)
ESTIMATED AVERAGE GLUCOSE: 355 — SIGNIFICANT CHANGE UP
GAS PNL BLDV: 128 MMOL/L — LOW (ref 136–145)
GLUCOSE BLDC GLUCOMTR-MCNC: 279 MG/DL — HIGH (ref 70–99)
GLUCOSE BLDC GLUCOMTR-MCNC: 284 MG/DL — HIGH (ref 70–99)
GLUCOSE BLDC GLUCOMTR-MCNC: 474 MG/DL — CRITICAL HIGH (ref 70–99)
GLUCOSE BLDC GLUCOMTR-MCNC: 479 MG/DL — CRITICAL HIGH (ref 70–99)
GLUCOSE BLDC GLUCOMTR-MCNC: 529 MG/DL — CRITICAL HIGH (ref 70–99)
GLUCOSE BLDV-MCNC: 657 MG/DL — CRITICAL HIGH (ref 70–99)
GLUCOSE SERPL-MCNC: 581 MG/DL — CRITICAL HIGH (ref 70–99)
HCO3 BLDV-SCNC: 30 MMOL/L — HIGH (ref 22–29)
HCT VFR BLD CALC: 39.6 % — SIGNIFICANT CHANGE UP (ref 39–50)
HCT VFR BLDA CALC: 37 % — LOW (ref 39–51)
HGB BLD CALC-MCNC: 12.3 G/DL — LOW (ref 12.6–17.4)
HGB BLD-MCNC: 12.1 G/DL — LOW (ref 13–17)
HYPOCHROMIA BLD QL: SIGNIFICANT CHANGE UP
IANC: 5.6 K/UL — SIGNIFICANT CHANGE UP (ref 1.8–7.4)
INR BLD: 1.03 RATIO — SIGNIFICANT CHANGE UP (ref 0.85–1.18)
LACTATE BLDV-MCNC: 1.2 MMOL/L — SIGNIFICANT CHANGE UP (ref 0.5–2)
LYMPHOCYTES # BLD AUTO: 0.73 K/UL — LOW (ref 1–3.3)
LYMPHOCYTES # BLD AUTO: 10.3 % — LOW (ref 13–44)
MAGNESIUM SERPL-MCNC: 2.8 MG/DL — HIGH (ref 1.6–2.6)
MCHC RBC-ENTMCNC: 18.9 PG — LOW (ref 27–34)
MCHC RBC-ENTMCNC: 30.6 GM/DL — LOW (ref 32–36)
MCV RBC AUTO: 62 FL — LOW (ref 80–100)
MICROCYTES BLD QL: SIGNIFICANT CHANGE UP
MONOCYTES # BLD AUTO: 0.06 K/UL — SIGNIFICANT CHANGE UP (ref 0–0.9)
MONOCYTES NFR BLD AUTO: 0.9 % — LOW (ref 2–14)
NEUTROPHILS # BLD AUTO: 5.53 K/UL — SIGNIFICANT CHANGE UP (ref 1.8–7.4)
NEUTROPHILS NFR BLD AUTO: 77.6 % — HIGH (ref 43–77)
PCO2 BLDV: 60 MMHG — HIGH (ref 42–55)
PH BLDV: 7.31 — LOW (ref 7.32–7.43)
PHOSPHATE SERPL-MCNC: 2.8 MG/DL — SIGNIFICANT CHANGE UP (ref 2.5–4.5)
PLAT MORPH BLD: ABNORMAL
PLATELET # BLD AUTO: 172 K/UL — SIGNIFICANT CHANGE UP (ref 150–400)
PLATELET COUNT - ESTIMATE: NORMAL — SIGNIFICANT CHANGE UP
PO2 BLDV: 42 MMHG — SIGNIFICANT CHANGE UP (ref 25–45)
POIKILOCYTOSIS BLD QL AUTO: SLIGHT — SIGNIFICANT CHANGE UP
POLYCHROMASIA BLD QL SMEAR: SLIGHT — SIGNIFICANT CHANGE UP
POTASSIUM BLDV-SCNC: 5.2 MMOL/L — HIGH (ref 3.5–5.1)
POTASSIUM SERPL-MCNC: 4.9 MMOL/L — SIGNIFICANT CHANGE UP (ref 3.5–5.3)
POTASSIUM SERPL-SCNC: 4.9 MMOL/L — SIGNIFICANT CHANGE UP (ref 3.5–5.3)
PROT SERPL-MCNC: 6.9 G/DL — SIGNIFICANT CHANGE UP (ref 6–8.3)
PROTHROM AB SERPL-ACNC: 11.6 SEC — SIGNIFICANT CHANGE UP (ref 9.5–13)
RBC # BLD: 6.39 M/UL — HIGH (ref 4.2–5.8)
RBC # FLD: 19.6 % — HIGH (ref 10.3–14.5)
RBC BLD AUTO: SIGNIFICANT CHANGE UP
SAO2 % BLDV: 69 % — SIGNIFICANT CHANGE UP (ref 67–88)
SCHISTOCYTES BLD QL AUTO: SLIGHT — SIGNIFICANT CHANGE UP
SODIUM SERPL-SCNC: 131 MMOL/L — LOW (ref 135–145)
TARGETS BLD QL SMEAR: SIGNIFICANT CHANGE UP
TROPONIN T, HIGH SENSITIVITY RESULT: 75 NG/L — CRITICAL HIGH
TROPONIN T, HIGH SENSITIVITY RESULT: 76 NG/L — CRITICAL HIGH
VARIANT LYMPHS # BLD: 6.9 % — HIGH (ref 0–6)
WBC # BLD: 7.12 K/UL — SIGNIFICANT CHANGE UP (ref 3.8–10.5)
WBC # FLD AUTO: 7.12 K/UL — SIGNIFICANT CHANGE UP (ref 3.8–10.5)

## 2024-01-15 PROCEDURE — 99285 EMERGENCY DEPT VISIT HI MDM: CPT

## 2024-01-15 PROCEDURE — 71045 X-RAY EXAM CHEST 1 VIEW: CPT | Mod: 26

## 2024-01-15 RX ORDER — INSULIN LISPRO 100/ML
VIAL (ML) SUBCUTANEOUS EVERY 6 HOURS
Refills: 0 | Status: DISCONTINUED | OUTPATIENT
Start: 2024-01-15 | End: 2024-01-15

## 2024-01-15 RX ORDER — HYDRALAZINE HCL 50 MG
100 TABLET ORAL THREE TIMES A DAY
Refills: 0 | Status: DISCONTINUED | OUTPATIENT
Start: 2024-01-15 | End: 2024-01-17

## 2024-01-15 RX ORDER — INSULIN LISPRO 100/ML
VIAL (ML) SUBCUTANEOUS AT BEDTIME
Refills: 0 | Status: DISCONTINUED | OUTPATIENT
Start: 2024-01-15 | End: 2024-01-19

## 2024-01-15 RX ORDER — HYDRALAZINE HCL 50 MG
10 TABLET ORAL ONCE
Refills: 0 | Status: COMPLETED | OUTPATIENT
Start: 2024-01-15 | End: 2024-01-15

## 2024-01-15 RX ORDER — INSULIN LISPRO 100/ML
VIAL (ML) SUBCUTANEOUS EVERY 4 HOURS
Refills: 0 | Status: DISCONTINUED | OUTPATIENT
Start: 2024-01-15 | End: 2024-01-15

## 2024-01-15 RX ORDER — HYDRALAZINE HCL 50 MG
1 TABLET ORAL
Qty: 0 | Refills: 3 | DISCHARGE

## 2024-01-15 RX ORDER — INSULIN LISPRO 100/ML
5 VIAL (ML) SUBCUTANEOUS
Refills: 0 | Status: DISCONTINUED | OUTPATIENT
Start: 2024-01-15 | End: 2024-01-16

## 2024-01-15 RX ORDER — HUMAN INSULIN 100 [IU]/ML
0 INJECTION, SUSPENSION SUBCUTANEOUS
Qty: 0 | Refills: 5 | DISCHARGE

## 2024-01-15 RX ORDER — INSULIN LISPRO 100/ML
VIAL (ML) SUBCUTANEOUS
Refills: 0 | Status: DISCONTINUED | OUTPATIENT
Start: 2024-01-15 | End: 2024-01-19

## 2024-01-15 RX ORDER — SODIUM CHLORIDE 9 MG/ML
1000 INJECTION, SOLUTION INTRAVENOUS
Refills: 0 | Status: DISCONTINUED | OUTPATIENT
Start: 2024-01-15 | End: 2024-01-19

## 2024-01-15 RX ORDER — HEPARIN SODIUM 5000 [USP'U]/ML
5000 INJECTION INTRAVENOUS; SUBCUTANEOUS EVERY 12 HOURS
Refills: 0 | Status: DISCONTINUED | OUTPATIENT
Start: 2024-01-15 | End: 2024-01-19

## 2024-01-15 RX ORDER — SODIUM CHLORIDE 9 MG/ML
100 INJECTION INTRAMUSCULAR; INTRAVENOUS; SUBCUTANEOUS
Refills: 0 | Status: DISCONTINUED | OUTPATIENT
Start: 2024-01-15 | End: 2024-01-19

## 2024-01-15 RX ORDER — CALCIUM ACETATE 667 MG
1334 TABLET ORAL
Refills: 0 | Status: DISCONTINUED | OUTPATIENT
Start: 2024-01-15 | End: 2024-01-18

## 2024-01-15 RX ORDER — INSULIN HUMAN 100 [IU]/ML
10 INJECTION, SOLUTION SUBCUTANEOUS ONCE
Refills: 0 | Status: DISCONTINUED | OUTPATIENT
Start: 2024-01-15 | End: 2024-01-15

## 2024-01-15 RX ORDER — GLUCAGON INJECTION, SOLUTION 0.5 MG/.1ML
1 INJECTION, SOLUTION SUBCUTANEOUS ONCE
Refills: 0 | Status: DISCONTINUED | OUTPATIENT
Start: 2024-01-15 | End: 2024-01-19

## 2024-01-15 RX ORDER — CARVEDILOL PHOSPHATE 80 MG/1
12.5 CAPSULE, EXTENDED RELEASE ORAL EVERY 12 HOURS
Refills: 0 | Status: DISCONTINUED | OUTPATIENT
Start: 2024-01-15 | End: 2024-01-17

## 2024-01-15 RX ORDER — DEXTROSE 50 % IN WATER 50 %
12.5 SYRINGE (ML) INTRAVENOUS ONCE
Refills: 0 | Status: DISCONTINUED | OUTPATIENT
Start: 2024-01-15 | End: 2024-01-19

## 2024-01-15 RX ORDER — DEXTROSE 50 % IN WATER 50 %
25 SYRINGE (ML) INTRAVENOUS ONCE
Refills: 0 | Status: DISCONTINUED | OUTPATIENT
Start: 2024-01-15 | End: 2024-01-19

## 2024-01-15 RX ORDER — INSULIN LISPRO 100/ML
10 VIAL (ML) SUBCUTANEOUS ONCE
Refills: 0 | Status: COMPLETED | OUTPATIENT
Start: 2024-01-15 | End: 2024-01-15

## 2024-01-15 RX ORDER — HUMAN INSULIN 100 [IU]/ML
14 INJECTION, SUSPENSION SUBCUTANEOUS
Refills: 0 | Status: DISCONTINUED | OUTPATIENT
Start: 2024-01-15 | End: 2024-01-15

## 2024-01-15 RX ORDER — FUROSEMIDE 40 MG
1 TABLET ORAL
Qty: 0 | Refills: 0 | DISCHARGE

## 2024-01-15 RX ORDER — INSULIN LISPRO 100/ML
VIAL (ML) SUBCUTANEOUS
Refills: 0 | Status: DISCONTINUED | OUTPATIENT
Start: 2024-01-15 | End: 2024-01-15

## 2024-01-15 RX ORDER — ATORVASTATIN CALCIUM 80 MG/1
20 TABLET, FILM COATED ORAL AT BEDTIME
Refills: 0 | Status: DISCONTINUED | OUTPATIENT
Start: 2024-01-15 | End: 2024-01-19

## 2024-01-15 RX ORDER — DEXTROSE 50 % IN WATER 50 %
15 SYRINGE (ML) INTRAVENOUS ONCE
Refills: 0 | Status: DISCONTINUED | OUTPATIENT
Start: 2024-01-15 | End: 2024-01-19

## 2024-01-15 RX ORDER — NIFEDIPINE 30 MG
60 TABLET, EXTENDED RELEASE 24 HR ORAL
Refills: 0 | Status: DISCONTINUED | OUTPATIENT
Start: 2024-01-15 | End: 2024-01-17

## 2024-01-15 RX ORDER — HUMAN INSULIN 100 [IU]/ML
15 INJECTION, SUSPENSION SUBCUTANEOUS ONCE
Refills: 0 | Status: COMPLETED | OUTPATIENT
Start: 2024-01-16 | End: 2024-01-16

## 2024-01-15 RX ADMIN — ATORVASTATIN CALCIUM 20 MILLIGRAM(S): 80 TABLET, FILM COATED ORAL at 23:31

## 2024-01-15 RX ADMIN — Medication 10 UNIT(S): at 16:32

## 2024-01-15 RX ADMIN — Medication 60 MILLIGRAM(S): at 17:32

## 2024-01-15 RX ADMIN — CARVEDILOL PHOSPHATE 12.5 MILLIGRAM(S): 80 CAPSULE, EXTENDED RELEASE ORAL at 17:33

## 2024-01-15 RX ADMIN — Medication 100 MILLIGRAM(S): at 23:31

## 2024-01-15 RX ADMIN — Medication 1334 MILLIGRAM(S): at 17:33

## 2024-01-15 RX ADMIN — Medication 1: at 23:34

## 2024-01-15 RX ADMIN — HEPARIN SODIUM 5000 UNIT(S): 5000 INJECTION INTRAVENOUS; SUBCUTANEOUS at 17:32

## 2024-01-15 RX ADMIN — HUMAN INSULIN 14 UNIT(S): 100 INJECTION, SUSPENSION SUBCUTANEOUS at 17:31

## 2024-01-15 NOTE — H&P ADULT - NSICDXPASTMEDICALHX_GEN_ALL_CORE_FT
PAST MEDICAL HISTORY:  Benign essential HTN     Diabetes     ESRD on hemodialysis     HLD (hyperlipidemia)     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

## 2024-01-15 NOTE — PHARMACOTHERAPY INTERVENTION NOTE - COMMENTS
Medication history is complete. Medication list updated in Outpatient Medication Record (OMR). Please call spectra [id number] if you have any questions.     Medication history obtained from patient's outpatient pharmacy as patient unable to recall entirety of list on interview.  Medication history is complete. Medication list updated in Outpatient Medication Record (OMR). Please call spectra [id number] if you have any questions.     Medication history obtained from patient's outpatient pharmacy as patient unable to recall entirety of list on interview.  Medication history is complete. Medication list updated in Outpatient Medication Record (OMR). Please call spectra [id number] if you have any questions.     Medication history obtained from patient's outpatient pharmacy as patient unable to recall entirety of list on interview.

## 2024-01-15 NOTE — H&P ADULT - HISTORY OF PRESENT ILLNESS
74 y/o male w/ HTN, HLD, T2DM on insulin, ESRD (HD on MWF via R arm AVF. AVF creation surgery in 9/2022, HD since 12/2022)  for a little over a year, h/o poor compliance w meds and frequent admission, now being sent from the dialysis center 2/2 hypertensive urgency and hyperglycemia    Ptn is asymptomatic. FS in the ED close to 500. BP at the dialysis center: 200/80  Denies fevers, chills, nausea, vomiting, dizziness, chest pain, SOB, abdominal pain, dysuria, hematuria.  72 y/o male w/ HTN, HLD, T2DM on insulin, ESRD (HD on MWF via R arm AVF. AVF creation surgery in 9/2022, HD since 12/2022)  for a little over a year, h/o poor compliance w meds and frequent admission, now being sent from the dialysis center 2/2 hypertensive urgency and hyperglycemia    Ptn is asymptomatic. FS in the ED close to 500. BP at the dialysis center: 200/80  Denies fevers, chills, nausea, vomiting, dizziness, chest pain, SOB, abdominal pain, dysuria, hematuria.

## 2024-01-15 NOTE — ED PROVIDER NOTE - CLINICAL SUMMARY MEDICAL DECISION MAKING FREE TEXT BOX
74 y/o male w/ HTN, HLD, T2DM on insulin, ESRD (HD on MWF) is currently asymptomatic. Denies fevers, chills, nausea, vomiting, dizziness, chest pain, SOB, abdominal pain, dysuria, hematuria. Pt was sent from dialysis center because of HTN 200s/80s and high glucose. Hypertensive to 200s/80s in ED, otherwise asymptomatic. .Will draw DKA labs, hydralazine for HTN as per nephrology team (Swapnil) and reassess w/ admission for [facility] to [facility]. 72 y/o male w/ HTN, HLD, T2DM on insulin, ESRD (HD on MWF) is currently asymptomatic. Denies fevers, chills, nausea, vomiting, dizziness, chest pain, SOB, abdominal pain, dysuria, hematuria. Pt was sent from dialysis center because of HTN 200s/80s and high glucose. Hypertensive to 200s/80s in ED, otherwise asymptomatic. .Will draw DKA labs, hydralazine for HTN as per nephrology team (Swapnil) and reassess w/ admission for HD to [facility].

## 2024-01-15 NOTE — CONSULT NOTE ADULT - SUBJECTIVE AND OBJECTIVE BOX
Dr. Kraft  Office [phone] (9 am to 5 pm)  Service : [phone] ( 5pm to 9 am)    Jennifer SIEGEL      RENAL INITIAL CONSULT NOTE: DATE OF SERVICE 01-15-24 @ 15:00    HPI:  74 y/o male w/ HTN, HLD, T2DM on insulin, ESRD (HD on MWF via R arm AVF. AVF creation surgery in 9/2022, HD since 12/2022)  for a little over a year, h/o poor compliance w meds and frequent admission, now being sent from the dialysis center 2/2 hypertensive urgency and hyperglycemia      Allergies:  No Known Allergies      PAST MEDICAL & SURGICAL HISTORY:  Diabetes      Benign essential HTN      HLD (hyperlipidemia)      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      ESRD on hemodialysis      Arteriovenous fistula          Home Medications Reviewed    Hospital Medications:   MEDICATIONS  (STANDING):  atorvastatin 20 milliGRAM(s) Oral at bedtime  calcium acetate 1334 milliGRAM(s) Oral two times a day with meals  carvedilol 12.5 milliGRAM(s) Oral every 12 hours  dextrose 5%. 1000 milliLiter(s) (50 mL/Hr) IV Continuous <Continuous>  dextrose 5%. 1000 milliLiter(s) (100 mL/Hr) IV Continuous <Continuous>  dextrose 50% Injectable 25 Gram(s) IV Push once  dextrose 50% Injectable 12.5 Gram(s) IV Push once  dextrose 50% Injectable 25 Gram(s) IV Push once  glucagon  Injectable 1 milliGRAM(s) IntraMuscular once  heparin   Injectable 5000 Unit(s) SubCutaneous every 12 hours  hydrALAZINE 100 milliGRAM(s) Oral three times a day  insulin lispro (ADMELOG) corrective regimen sliding scale   SubCutaneous three times a day before meals  insulin NPH human recombinant 14 Unit(s) SubCutaneous three times a day before meals  NIFEdipine XL 60 milliGRAM(s) Oral two times a day      SOCIAL HISTORY:  Denies ETOh, Smoking,     FAMILY HISTORY:  FHx: diabetes mellitus (Father, Aunt)        REVIEW OF SYSTEMS:  CONSTITUTIONAL: No weakness, fevers or chills  EYES/ENT: No visual changes;  No vertigo or throat pain   NECK: No pain or stiffness  RESPIRATORY: No cough, wheezing, hemoptysis; No shortness of breath  CARDIOVASCULAR: No chest pain or palpitations.  GASTROINTESTINAL: No abdominal or epigastric pain. No nausea, vomiting, or hematemesis; No diarrhea or constipation. No melena or hematochezia.  GENITOURINARY: No dysuria, frequency, foamy urine, urinary urgency, incontinence or hematuria  NEUROLOGICAL: No numbness or weakness  SKIN: No itching, burning, rashes, or lesions   VASCULAR: No bilateral lower extremity edema.   All other review of systems is negative unless indicated above.    VITALS:  T(F): 98 (01-15-24 @ 11:30), Max: 98 (01-15-24 @ 11:30)  HR: 47 (01-15-24 @ 14:07)  BP: 196/56 (01-15-24 @ 14:07)  RR: 16 (01-15-24 @ 14:07)  SpO2: 100% (01-15-24 @ 14:07)  Wt(kg): --        PHYSICAL EXAM:  Constitutional: NAD  HEENT: anicteric sclera, oropharynx clear, MMM  Neck: No JVD  Respiratory: CTAB, no wheezes, rales or rhonchi  Cardiovascular: S1, S2, RRR  Gastrointestinal: BS+, soft, NT/ND  Extremities: No cyanosis or clubbing. No peripheral edema  Neurological: A/O x 3, no focal deficits  Psychiatric: Normal mood, normal affect  : No CVA tenderness. No belle.   Skin: No rashes  Vascular Access: AVF    LABS:  01-15    131<L>  |  91<L>  |  35<H>  ----------------------------<  581<HH>  4.9   |  26  |  6.08<H>    Ca    9.4      15 Deuce 2024 13:36  Phos  2.8     01-15  Mg     2.80     01-15    TPro  6.9  /  Alb  3.8  /  TBili  0.4  /  DBili      /  AST  26  /  ALT  9   /  AlkPhos  132<H>  01-15    Creatinine Trend: 6.08 <--                        12.1   7.12  )-----------( 172      ( 15 Deuce 2024 13:36 )             39.6     Urine Studies:  Urinalysis Basic - ( 15 Deuce 2024 13:36 )    Color:  / Appearance:  / SG:  / pH:   Gluc: 581 mg/dL / Ketone:   / Bili:  / Urobili:    Blood:  / Protein:  / Nitrite:    Leuk Esterase:  / RBC:  / WBC    Sq Epi:  / Non Sq Epi:  / Bacteria:           RADIOLOGY & ADDITIONAL STUDIES:                 Dr. Kraft  Office [phone] (9 am to 5 pm)  Service : [phone] ( 5pm to 9 am)    Jennifer SIEGEL      RENAL INITIAL CONSULT NOTE: DATE OF SERVICE 01-15-24 @ 15:00    HPI:  74 y/o male w/ HTN, HLD, T2DM on insulin, ESRD (HD on MWF via R arm AVF. AVF creation surgery in 9/2022, HD since 12/2022)  for a little over a year, h/o poor compliance w meds and frequent admission, now being sent from the dialysis center 2/2 hypertensive urgency and hyperglycemia      Allergies:  No Known Allergies      PAST MEDICAL & SURGICAL HISTORY:  Diabetes      Benign essential HTN      HLD (hyperlipidemia)      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      ESRD on hemodialysis      Arteriovenous fistula          Home Medications Reviewed    Hospital Medications:   MEDICATIONS  (STANDING):  atorvastatin 20 milliGRAM(s) Oral at bedtime  calcium acetate 1334 milliGRAM(s) Oral two times a day with meals  carvedilol 12.5 milliGRAM(s) Oral every 12 hours  dextrose 5%. 1000 milliLiter(s) (50 mL/Hr) IV Continuous <Continuous>  dextrose 5%. 1000 milliLiter(s) (100 mL/Hr) IV Continuous <Continuous>  dextrose 50% Injectable 25 Gram(s) IV Push once  dextrose 50% Injectable 12.5 Gram(s) IV Push once  dextrose 50% Injectable 25 Gram(s) IV Push once  glucagon  Injectable 1 milliGRAM(s) IntraMuscular once  heparin   Injectable 5000 Unit(s) SubCutaneous every 12 hours  hydrALAZINE 100 milliGRAM(s) Oral three times a day  insulin lispro (ADMELOG) corrective regimen sliding scale   SubCutaneous three times a day before meals  insulin NPH human recombinant 14 Unit(s) SubCutaneous three times a day before meals  NIFEdipine XL 60 milliGRAM(s) Oral two times a day      SOCIAL HISTORY:  Denies ETOh, Smoking,     FAMILY HISTORY:  FHx: diabetes mellitus (Father, Aunt)        REVIEW OF SYSTEMS:  CONSTITUTIONAL: No weakness, fevers or chills  EYES/ENT: No visual changes;  No vertigo or throat pain   NECK: No pain or stiffness  RESPIRATORY: No cough, wheezing, hemoptysis; No shortness of breath  CARDIOVASCULAR: No chest pain or palpitations.  GASTROINTESTINAL: No abdominal or epigastric pain. No nausea, vomiting, or hematemesis; No diarrhea or constipation. No melena or hematochezia.  GENITOURINARY: No dysuria, frequency, foamy urine, urinary urgency, incontinence or hematuria  NEUROLOGICAL: No numbness or weakness  SKIN: No itching, burning, rashes, or lesions   VASCULAR: No bilateral lower extremity edema.   All other review of systems is negative unless indicated above.    VITALS:  T(F): 98 (01-15-24 @ 11:30), Max: 98 (01-15-24 @ 11:30)  HR: 47 (01-15-24 @ 14:07)  BP: 196/56 (01-15-24 @ 14:07)  RR: 16 (01-15-24 @ 14:07)  SpO2: 100% (01-15-24 @ 14:07)  Wt(kg): --        PHYSICAL EXAM:  Constitutional: NAD  HEENT: anicteric sclera, oropharynx clear, MMM  Neck: No JVD  Respiratory: CTAB, no wheezes, rales or rhonchi  Cardiovascular: S1, S2, RRR  Gastrointestinal: BS+, soft, NT/ND  Extremities: No cyanosis or clubbing. No peripheral edema  Neurological: A/O x 3, no focal deficits  Psychiatric: Normal mood, normal affect  : No CVA tenderness. No belle.   Skin: No rashes  Vascular Access: AVF    LABS:  01-15    131<L>  |  91<L>  |  35<H>  ----------------------------<  581<HH>  4.9   |  26  |  6.08<H>    Ca    9.4      15 Deuce 2024 13:36  Phos  2.8     01-15  Mg     2.80     01-15    TPro  6.9  /  Alb  3.8  /  TBili  0.4  /  DBili      /  AST  26  /  ALT  9   /  AlkPhos  132<H>  01-15    Creatinine Trend: 6.08 <--                        12.1   7.12  )-----------( 172      ( 15 Deuce 2024 13:36 )             39.6     Urine Studies:  Urinalysis Basic - ( 15 Deuce 2024 13:36 )    Color:  / Appearance:  / SG:  / pH:   Gluc: 581 mg/dL / Ketone:   / Bili:  / Urobili:    Blood:  / Protein:  / Nitrite:    Leuk Esterase:  / RBC:  / WBC    Sq Epi:  / Non Sq Epi:  / Bacteria:           RADIOLOGY & ADDITIONAL STUDIES:                 Dr. Kraft  Office [phone] (9 am to 5 pm)  Service : [phone] ( 5pm to 9 am)    Jennifer SIEGEL      RENAL INITIAL CONSULT NOTE: DATE OF SERVICE 01-15-24 @ 15:00    HPI:  72 y/o male w/ HTN, HLD, T2DM on insulin, ESRD (HD on MWF via R arm AVF. AVF creation surgery in 9/2022, HD since 12/2022)  for a little over a year, h/o poor compliance w meds and frequent admission, now being sent from the dialysis center 2/2 hypertensive urgency and hyperglycemia      Allergies:  No Known Allergies      PAST MEDICAL & SURGICAL HISTORY:  Diabetes      Benign essential HTN      HLD (hyperlipidemia)      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      ESRD on hemodialysis      Arteriovenous fistula          Home Medications Reviewed    Hospital Medications:   MEDICATIONS  (STANDING):  atorvastatin 20 milliGRAM(s) Oral at bedtime  calcium acetate 1334 milliGRAM(s) Oral two times a day with meals  carvedilol 12.5 milliGRAM(s) Oral every 12 hours  dextrose 5%. 1000 milliLiter(s) (50 mL/Hr) IV Continuous <Continuous>  dextrose 5%. 1000 milliLiter(s) (100 mL/Hr) IV Continuous <Continuous>  dextrose 50% Injectable 25 Gram(s) IV Push once  dextrose 50% Injectable 12.5 Gram(s) IV Push once  dextrose 50% Injectable 25 Gram(s) IV Push once  glucagon  Injectable 1 milliGRAM(s) IntraMuscular once  heparin   Injectable 5000 Unit(s) SubCutaneous every 12 hours  hydrALAZINE 100 milliGRAM(s) Oral three times a day  insulin lispro (ADMELOG) corrective regimen sliding scale   SubCutaneous three times a day before meals  insulin NPH human recombinant 14 Unit(s) SubCutaneous three times a day before meals  NIFEdipine XL 60 milliGRAM(s) Oral two times a day      SOCIAL HISTORY:  Denies ETOh, Smoking,     FAMILY HISTORY:  FHx: diabetes mellitus (Father, Aunt)        REVIEW OF SYSTEMS:  CONSTITUTIONAL: No weakness, fevers or chills  EYES/ENT: No visual changes;  No vertigo or throat pain   NECK: No pain or stiffness  RESPIRATORY: No cough, wheezing, hemoptysis; No shortness of breath  CARDIOVASCULAR: No chest pain or palpitations.  GASTROINTESTINAL: No abdominal or epigastric pain. No nausea, vomiting, or hematemesis; No diarrhea or constipation. No melena or hematochezia.  GENITOURINARY: No dysuria, frequency, foamy urine, urinary urgency, incontinence or hematuria  NEUROLOGICAL: No numbness or weakness  SKIN: No itching, burning, rashes, or lesions   VASCULAR: No bilateral lower extremity edema.   All other review of systems is negative unless indicated above.    VITALS:  T(F): 98 (01-15-24 @ 11:30), Max: 98 (01-15-24 @ 11:30)  HR: 47 (01-15-24 @ 14:07)  BP: 196/56 (01-15-24 @ 14:07)  RR: 16 (01-15-24 @ 14:07)  SpO2: 100% (01-15-24 @ 14:07)  Wt(kg): --        PHYSICAL EXAM:  Constitutional: NAD  HEENT: anicteric sclera, oropharynx clear, MMM  Neck: No JVD  Respiratory: CTAB, no wheezes, rales or rhonchi  Cardiovascular: S1, S2, RRR  Gastrointestinal: BS+, soft, NT/ND  Extremities: No cyanosis or clubbing. No peripheral edema  Neurological: A/O x 3, no focal deficits  Psychiatric: Normal mood, normal affect  : No CVA tenderness. No belle.   Skin: No rashes  Vascular Access: AVF    LABS:  01-15    131<L>  |  91<L>  |  35<H>  ----------------------------<  581<HH>  4.9   |  26  |  6.08<H>    Ca    9.4      15 Deuce 2024 13:36  Phos  2.8     01-15  Mg     2.80     01-15    TPro  6.9  /  Alb  3.8  /  TBili  0.4  /  DBili      /  AST  26  /  ALT  9   /  AlkPhos  132<H>  01-15    Creatinine Trend: 6.08 <--                        12.1   7.12  )-----------( 172      ( 15 Deuce 2024 13:36 )             39.6     Urine Studies:  Urinalysis Basic - ( 15 Deuce 2024 13:36 )    Color:  / Appearance:  / SG:  / pH:   Gluc: 581 mg/dL / Ketone:   / Bili:  / Urobili:    Blood:  / Protein:  / Nitrite:    Leuk Esterase:  / RBC:  / WBC    Sq Epi:  / Non Sq Epi:  / Bacteria:           RADIOLOGY & ADDITIONAL STUDIES:

## 2024-01-15 NOTE — H&P ADULT - NSHPPHYSICALEXAM_GEN_ALL_CORE
T(C): 36.7 (01-15-24 @ 11:30), Max: 36.7 (01-15-24 @ 11:30)  HR: 55 (01-15-24 @ 11:30) (55 - 55)  BP: 219/81 (01-15-24 @ 11:30) (219/81 - 219/81)  RR: 18 (01-15-24 @ 11:30) (18 - 18)  SpO2: 99% (01-15-24 @ 11:30) (99% - 99%)    PHYSICAL EXAM:  GENERAL: NAD, well-developed  HEAD:  Atraumatic, Normocephalic  EYES: EOMI, PERRLA, conjunctiva and sclera clear  NECK: Supple, No JVD  CHEST/LUNG: Clear to auscultation bilaterally; No wheeze  HEART: Regular rate and rhythm; No murmurs, rubs, or gallops  ABDOMEN: Soft, Nontender, Nondistended; Bowel sounds present  EXTREMITIES:  2+ Peripheral Pulses, No clubbing, cyanosis, or edema  PSYCH: AAOx3  NEUROLOGY: non-focal  SKIN: No rashes or lesions

## 2024-01-15 NOTE — ED ADULT NURSE NOTE - OBJECTIVE STATEMENT
Ani RN: patient alert and oriented x4 ambulatory at baseline, c/o hypertension and hyperglycemia. patient brought in by EMS from dialysis center where they could not dialyze him due to his BP and elevated glucose. patient is well appearing, no acute distress. denies any other complaints at this time. patient states he has a past medical history of diabetes mellitus type 2, he takes "a pill" for his diabetes mellitus and is also supposed to take insulin but states he does not take it "most of the time". report given to Primary RN [facility]. 20G IV placed in L AC, labs sent.

## 2024-01-15 NOTE — CHART NOTE - NSCHARTNOTEFT_GEN_A_CORE
Name: JIMMY BLAND   MRN: [id number]  Location: [place] [address]    The patient is a 73yMale with  HTN, HLD, T2DM on insulin, ESRD (HD on MWF via R arm AVF. AVF creation surgery in 9/2022, HD since 12/2022)  for a little over a year, h/o poor compliance w meds and frequent admission, now being sent from the dialysis center 2/2 hypertensive urgency and hyperglycemia. Endocrinology consulted for uncontrolled T2DM      #Uncontrolled Type 2 Diabetes Mellitus   - A1C with Estimated Average Glucose Result: 14.0 % (01-15-24)  - home regimen: patient states he is on insulin but cannot recall what type or how much he takes. He states he usually doesn't take it, and hasn't taken any in 3 weeks  - eGFR: 9 mL/min/1.73m2 (01-15-24)  - no evidence of DKA on labs  - patient weight: not documented but pt states he weight 165 lbs (75 kg)  - s/p 14 units NPH at 5:30 PM    PLAN:  - will order additional 15 units NPH to be given tmrw AM, as bridge to lantus dosing in the evening   - Keep patient NPO and give low-dose sliding scale insulin q4h until glucose <300.   - once glucose <300: can start CC diet, change sliding scale to TIDAC and qhs, and add admelog 5 units before meals  - Please check FSG before meals and QHS, or q6h while NPO  - Inpatient glucose goals: <140 pre-meal, <180 random  - please document patient weight    Full consult to follow in AM    Paul Webb MD  Endocrine Fellow  For nonurgent matters, please email [email or web address] or [email or web address]. For urgent follow up questions, discharge recommendations, or new consults please call answering service at [phone] (weekdays), [phone] (nights/weekends). Name: JIMMY BLAND   MRN: [id number]  Location: [place] [address]    The patient is a 73yMale with  HTN, HLD, T2DM on insulin, ESRD (HD on MWF via R arm AVF. AVF creation surgery in 9/2022, HD since 12/2022)  for a little over a year, h/o poor compliance w meds and frequent admission, now being sent from the dialysis center 2/2 hypertensive urgency and hyperglycemia. Endocrinology consulted for uncontrolled T2DM      #Uncontrolled Type 2 Diabetes Mellitus   - A1C with Estimated Average Glucose Result: 14.0 % (01-15-24)  - home regimen: patient states he is on insulin but cannot recall what type or how much he takes. He states he usually doesn't take it, and hasn't taken any in 3 weeks  - eGFR: 9 mL/min/1.73m2 (01-15-24)  - no evidence of DKA on labs  - patient weight: not documented but pt states he weight 165 lbs (75 kg)  - s/p 14 units NPH at 5:30 PM    PLAN:  - will order additional 15 units NPH to be given tmrw AM, as bridge to lantus dosing in the evening   - Keep patient NPO and give low-dose sliding scale insulin q4h until glucose <300.   - once glucose <300: can start CC diet, change sliding scale to TIDAC and qhs, and add admelog 5 units TIDAC  - Please check FSG before meals and QHS, or q6h while NPO  - Inpatient glucose goals: <140 pre-meal, <180 random  - please document patient weight    Full consult to follow in AM    Paul Webb MD  Endocrine Fellow  For nonurgent matters, please email [email or web address] or [email or web address]. For urgent follow up questions, discharge recommendations, or new consults please call answering service at [phone] (weekdays), [phone] (nights/weekends). Name: JIMMY BLAND   MRN: [id number]  Location: [place] [address]    The patient is a 73yMale with  HTN, HLD, T2DM on insulin, ESRD (HD on MWF via R arm AVF. AVF creation surgery in 9/2022, HD since 12/2022)  for a little over a year, h/o poor compliance w meds and frequent admission, now being sent from the dialysis center 2/2 hypertensive urgency and hyperglycemia. Endocrinology consulted for uncontrolled T2DM      #Uncontrolled Type 2 Diabetes Mellitus   - A1C with Estimated Average Glucose Result: 14.0 % (01-15-24)  - home regimen: patient states he is on insulin but cannot recall what type or how much he takes. He states he usually doesn't take it, and hasn't taken any in 3 weeks  - eGFR: 9 mL/min/1.73m2 (01-15-24)  - no evidence of DKA on labs  - patient weight: not documented but pt states he weight 165 lbs (75 kg)  - s/p 14 units NPH at 5:30 PM    PLAN:  - will order additional 15 units NPH to be given tmrw AM, as bridge to lantus dosing in the evening   - Keep patient NPO and give low-dose sliding scale insulin q6h until glucose <300.   - once glucose <300: can start CC diet, change sliding scale to TIDAC and qhs, and add admelog 5 units TIDAC  - Please check FSG before meals and QHS, or q6h while NPO  - Inpatient glucose goals: <140 pre-meal, <180 random  - please document patient weight    Full consult to follow in AM    Paul Webb MD  Endocrine Fellow  For nonurgent matters, please email [email or web address] or [email or web address]. For urgent follow up questions, discharge recommendations, or new consults please call answering service at [phone] (weekdays), [phone] (nights/weekends).

## 2024-01-15 NOTE — ED ADULT TRIAGE NOTE - CHIEF COMPLAINT QUOTE
pt brought in by EMS from dialysis center for HTN and elevated glucose. Pt did not receive his dialysis. pt denies chest pain, sob, n/v/d, headache, blurry vision.

## 2024-01-15 NOTE — H&P ADULT - ASSESSMENT
72 y/o male w/ HTN, HLD, T2DM on insulin, ESRD (HD on MWF via R arm AVF. AVF creation surgery in 9/2022, HD since 12/2022)  for a little over a year, h/o poor compliance w meds and frequent admission, now being sent from the dialysis center 2/2 hypertensive urgency and hyperglycemia    Ptn is asymptomatic. FS in the ED close to 500. BP at the dialysis center: 200/80  Denies fevers, chills, nausea, vomiting, dizziness, chest pain, SOB, abdominal pain, dysuria, hematuria.     Hypertensive Urgency  ESRD on HD  Anemia, chronic    -HD as per renal  - anemia  - hyperglycemia  -hypertensive urgency  - poor medication compliance     dvt ppx wHSC 74 y/o male w/ HTN, HLD, T2DM on insulin, ESRD (HD on MWF via R arm AVF. AVF creation surgery in 9/2022, HD since 12/2022)  for a little over a year, h/o poor compliance w meds and frequent admission, now being sent from the dialysis center 2/2 hypertensive urgency and hyperglycemia    Ptn is asymptomatic. FS in the ED close to 500. BP at the dialysis center: 200/80  Denies fevers, chills, nausea, vomiting, dizziness, chest pain, SOB, abdominal pain, dysuria, hematuria.     Hypertensive Urgency  ESRD on HD  Anemia, chronic    -HD as per renal  - anemia  - hyperglycemia: endo called. will give reg insulin 10 U IVP for FS above 400. check FS hourly. 350-400 FS will give IV reg insulin 7Units, 300-349 IV insulin 5 Units, below  can be on insulin on a sliding  scale, this was d/w ACP  -hypertensive urgency: place on outptn meds  - poor medication compliance :"  i stopped using my meds"    dvt ppx wHSC

## 2024-01-15 NOTE — ED PROVIDER NOTE - PHYSICAL EXAMINATION
GENERAL: NAD  HEENT:  Atraumatic  CHEST/LUNG: Chest rise equal bilaterally  HEART: Regular rate and rhythm  ABDOMEN: Soft, Nontender, Nondistended  EXTREMITIES:  Extremities warm  PSYCH: A&Ox3  SKIN: No obvious rashes or lesion  NEUROLOGY: strength and sensation intact in all extremities

## 2024-01-15 NOTE — ED PROVIDER NOTE - OBJECTIVE STATEMENT
74 y/o male w/ HTN, HLD, T2DM on insulin, ESRD (HD on MWF) is currently asymptomatic. Denies fevers, chills, nausea, vomiting, dizziness, chest pain, SOB, abdominal pain, dysuria, hematuria. Pt was sent from dialysis center because of HTN 200s/80s and high glucose. 72 y/o male w/ HTN, HLD, T2DM on insulin, ESRD (HD on MWF) is currently asymptomatic. Denies fevers, chills, nausea, vomiting, dizziness, chest pain, SOB, abdominal pain, dysuria, hematuria. Pt was sent from dialysis center because of HTN 200s/80s and high glucose.

## 2024-01-15 NOTE — CONSULT NOTE ADULT - ASSESSMENT
74 y/o male w/ HTN, HLD, T2DM on insulin, ESRD (HD on MWF via R arm AVF. AVF creation surgery in 9/2022, HD since 12/2022)  for a little over a year, h/o poor compliance w meds and frequent admission, now being sent from the dialysis center 2/2 hypertensive urgency and hyperglycemia    A/P:  ESRD:  HD MWF  HD unit: QV  Nephro: Dr. Hardwick  Access: AVF  HD today  consent for HD in the chart  Renal diet    HTN:   Uncontrolled  s/p hydralazine 10mg IV one dose  resume home meds  monitor BP, will adjust meds as needed    Anemia:  Hb above goal  SILVA once Hb <11    CKD:MBD:  Check PO4, PTH 72 y/o male w/ HTN, HLD, T2DM on insulin, ESRD (HD on MWF via R arm AVF. AVF creation surgery in 9/2022, HD since 12/2022)  for a little over a year, h/o poor compliance w meds and frequent admission, now being sent from the dialysis center 2/2 hypertensive urgency and hyperglycemia    A/P:  ESRD:  HD MWF  HD unit: QV  Nephro: Dr. Hardwick  Access: AVF  HD today  consent for HD in the chart  Renal diet    HTN:   Uncontrolled  s/p hydralazine 10mg IV one dose  resume home meds  monitor BP, will adjust meds as needed    Anemia:  Hb above goal  SILVA once Hb <11    CKD:MBD:  Check PO4, PTH

## 2024-01-15 NOTE — ED ADULT NURSE NOTE - NSFALLUNIVINTERV_ED_ALL_ED
Bed/Stretcher in lowest position, wheels locked, appropriate side rails in place/Call bell, personal items and telephone in reach/Instruct patient to call for assistance before getting out of bed/chair/stretcher/Non-slip footwear applied when patient is off stretcher/[place] to call system/Physically safe environment - no spills, clutter or unnecessary equipment/Purposeful proactive rounding/Room/bathroom lighting operational, light cord in reach Bed/Stretcher in lowest position, wheels locked, appropriate side rails in place/Call bell, personal items and telephone in reach/Instruct patient to call for assistance before getting out of bed/chair/stretcher/Non-slip footwear applied when patient is off stretcher/[place] to call system/Physically safe environment - no spills, clutter or unnecessary equipment/Purposeful proactive rounding/Room/bathroom lighting operational, light cord in reach Bed/Stretcher in lowest position, wheels locked, appropriate side rails in place/Call bell, personal items and telephone in reach/Instruct patient to call for assistance before getting out of bed/chair/stretcher/Non-slip footwear applied when patient is off stretcher/[place] to call system/Physically safe environment - no spills, clutter or unnecessary equipment/Purposeful proactive rounding/Room/bathroom lighting operational, light cord in reach

## 2024-01-15 NOTE — CHART NOTE - NSCHARTNOTEFT_GEN_A_CORE
Notified by RN, pt with high blood sugars despite given admelog and NPH insulin. Dr Hayes notified. As per Dr Farrell, will give Regular insulin 10u SQ and consult endocrinology as BHB elevated to 0.5. Regular insulin ordered, endocrine consult emailed. Will sign out to oncoming PA to follow up endocrine recs.

## 2024-01-16 DIAGNOSIS — E11.65 TYPE 2 DIABETES MELLITUS WITH HYPERGLYCEMIA: ICD-10-CM

## 2024-01-16 DIAGNOSIS — E78.5 HYPERLIPIDEMIA, UNSPECIFIED: ICD-10-CM

## 2024-01-16 DIAGNOSIS — I10 ESSENTIAL (PRIMARY) HYPERTENSION: ICD-10-CM

## 2024-01-16 LAB
ANION GAP SERPL CALC-SCNC: 9 MMOL/L — SIGNIFICANT CHANGE UP (ref 7–14)
B-OH-BUTYR SERPL-SCNC: <0 MMOL/L — SIGNIFICANT CHANGE UP (ref 0–0.4)
BASE EXCESS BLDV CALC-SCNC: 3.9 MMOL/L — HIGH (ref -2–3)
BLOOD GAS VENOUS COMPREHENSIVE RESULT: SIGNIFICANT CHANGE UP
BUN SERPL-MCNC: 36 MG/DL — HIGH (ref 7–23)
CALCIUM SERPL-MCNC: 8.8 MG/DL — SIGNIFICANT CHANGE UP (ref 8.4–10.5)
CHLORIDE BLDV-SCNC: 96 MMOL/L — SIGNIFICANT CHANGE UP (ref 96–108)
CHLORIDE SERPL-SCNC: 98 MMOL/L — SIGNIFICANT CHANGE UP (ref 98–107)
CO2 BLDV-SCNC: 29.8 MMOL/L — HIGH (ref 22–26)
CO2 SERPL-SCNC: 28 MMOL/L — SIGNIFICANT CHANGE UP (ref 22–31)
CREAT SERPL-MCNC: 6.17 MG/DL — HIGH (ref 0.5–1.3)
EGFR: 9 ML/MIN/1.73M2 — LOW
GAS PNL BLDV: 131 MMOL/L — LOW (ref 136–145)
GAS PNL BLDV: SIGNIFICANT CHANGE UP
GLUCOSE BLDC GLUCOMTR-MCNC: 107 MG/DL — HIGH (ref 70–99)
GLUCOSE BLDC GLUCOMTR-MCNC: 125 MG/DL — HIGH (ref 70–99)
GLUCOSE BLDC GLUCOMTR-MCNC: 137 MG/DL — HIGH (ref 70–99)
GLUCOSE BLDC GLUCOMTR-MCNC: 138 MG/DL — HIGH (ref 70–99)
GLUCOSE BLDC GLUCOMTR-MCNC: 147 MG/DL — HIGH (ref 70–99)
GLUCOSE BLDC GLUCOMTR-MCNC: 157 MG/DL — HIGH (ref 70–99)
GLUCOSE BLDC GLUCOMTR-MCNC: 175 MG/DL — HIGH (ref 70–99)
GLUCOSE BLDC GLUCOMTR-MCNC: 205 MG/DL — HIGH (ref 70–99)
GLUCOSE BLDC GLUCOMTR-MCNC: 39 MG/DL — CRITICAL LOW (ref 70–99)
GLUCOSE BLDC GLUCOMTR-MCNC: 87 MG/DL — SIGNIFICANT CHANGE UP (ref 70–99)
GLUCOSE BLDV-MCNC: 210 MG/DL — HIGH (ref 70–99)
GLUCOSE SERPL-MCNC: 139 MG/DL — HIGH (ref 70–99)
HBV CORE AB SER-ACNC: SIGNIFICANT CHANGE UP
HBV SURFACE AB SER-ACNC: <3 MIU/ML — LOW
HBV SURFACE AG SER-ACNC: SIGNIFICANT CHANGE UP
HCO3 BLDV-SCNC: 28 MMOL/L — SIGNIFICANT CHANGE UP (ref 22–29)
HCT VFR BLD CALC: 34.6 % — LOW (ref 39–50)
HCT VFR BLDA CALC: 34 % — LOW (ref 39–51)
HCV AB S/CO SERPL IA: 0.09 S/CO — SIGNIFICANT CHANGE UP (ref 0–0.99)
HCV AB SERPL-IMP: SIGNIFICANT CHANGE UP
HGB BLD CALC-MCNC: 11.3 G/DL — LOW (ref 12.6–17.4)
HGB BLD-MCNC: 10.4 G/DL — LOW (ref 13–17)
LACTATE BLDV-MCNC: 2.3 MMOL/L — HIGH (ref 0.5–2)
LACTATE SERPL-SCNC: 1.3 MMOL/L — SIGNIFICANT CHANGE UP (ref 0.5–2)
MAGNESIUM SERPL-MCNC: 2.6 MG/DL — SIGNIFICANT CHANGE UP (ref 1.6–2.6)
MCHC RBC-ENTMCNC: 18.6 PG — LOW (ref 27–34)
MCHC RBC-ENTMCNC: 30.1 GM/DL — LOW (ref 32–36)
MCV RBC AUTO: 61.9 FL — LOW (ref 80–100)
MRSA PCR RESULT.: SIGNIFICANT CHANGE UP
MRSA PCR RESULT.: SIGNIFICANT CHANGE UP
NRBC # BLD: 0 /100 WBCS — SIGNIFICANT CHANGE UP (ref 0–0)
NRBC # FLD: 0 K/UL — SIGNIFICANT CHANGE UP (ref 0–0)
PCO2 BLDV: 42 MMHG — SIGNIFICANT CHANGE UP (ref 42–55)
PH BLDV: 7.44 — HIGH (ref 7.32–7.43)
PHOSPHATE SERPL-MCNC: 1.8 MG/DL — LOW (ref 2.5–4.5)
PLATELET # BLD AUTO: 226 K/UL — SIGNIFICANT CHANGE UP (ref 150–400)
PO2 BLDV: 126 MMHG — HIGH (ref 25–45)
POTASSIUM BLDV-SCNC: 3.3 MMOL/L — LOW (ref 3.5–5.1)
POTASSIUM SERPL-MCNC: 4 MMOL/L — SIGNIFICANT CHANGE UP (ref 3.5–5.3)
POTASSIUM SERPL-SCNC: 4 MMOL/L — SIGNIFICANT CHANGE UP (ref 3.5–5.3)
RBC # BLD: 5.59 M/UL — SIGNIFICANT CHANGE UP (ref 4.2–5.8)
RBC # FLD: 19 % — HIGH (ref 10.3–14.5)
S AUREUS DNA NOSE QL NAA+PROBE: SIGNIFICANT CHANGE UP
S AUREUS DNA NOSE QL NAA+PROBE: SIGNIFICANT CHANGE UP
SAO2 % BLDV: 98.5 % — HIGH (ref 67–88)
SODIUM SERPL-SCNC: 135 MMOL/L — SIGNIFICANT CHANGE UP (ref 135–145)
WBC # BLD: 6.61 K/UL — SIGNIFICANT CHANGE UP (ref 3.8–10.5)
WBC # FLD AUTO: 6.61 K/UL — SIGNIFICANT CHANGE UP (ref 3.8–10.5)

## 2024-01-16 PROCEDURE — 93306 TTE W/DOPPLER COMPLETE: CPT | Mod: 26

## 2024-01-16 PROCEDURE — 99223 1ST HOSP IP/OBS HIGH 75: CPT | Mod: GC

## 2024-01-16 RX ORDER — INSULIN GLARGINE 100 [IU]/ML
15 INJECTION, SOLUTION SUBCUTANEOUS AT BEDTIME
Refills: 0 | Status: DISCONTINUED | OUTPATIENT
Start: 2024-01-16 | End: 2024-01-16

## 2024-01-16 RX ORDER — CHLORHEXIDINE GLUCONATE 213 G/1000ML
1 SOLUTION TOPICAL DAILY
Refills: 0 | Status: DISCONTINUED | OUTPATIENT
Start: 2024-01-16 | End: 2024-01-19

## 2024-01-16 RX ORDER — CALAMINE AND ZINC OXIDE AND PHENOL 160; 10 MG/ML; MG/ML
1 LOTION TOPICAL ONCE
Refills: 0 | Status: COMPLETED | OUTPATIENT
Start: 2024-01-16 | End: 2024-01-17

## 2024-01-16 RX ORDER — DEXTROSE 50 % IN WATER 50 %
25 SYRINGE (ML) INTRAVENOUS ONCE
Refills: 0 | Status: COMPLETED | OUTPATIENT
Start: 2024-01-16 | End: 2024-01-16

## 2024-01-16 RX ADMIN — HUMAN INSULIN 15 UNIT(S): 100 INJECTION, SUSPENSION SUBCUTANEOUS at 09:59

## 2024-01-16 RX ADMIN — Medication 100 MILLIGRAM(S): at 10:19

## 2024-01-16 RX ADMIN — Medication 5 UNIT(S): at 09:58

## 2024-01-16 RX ADMIN — HEPARIN SODIUM 5000 UNIT(S): 5000 INJECTION INTRAVENOUS; SUBCUTANEOUS at 16:36

## 2024-01-16 RX ADMIN — CARVEDILOL PHOSPHATE 12.5 MILLIGRAM(S): 80 CAPSULE, EXTENDED RELEASE ORAL at 16:35

## 2024-01-16 RX ADMIN — Medication 25 GRAM(S): at 21:49

## 2024-01-16 RX ADMIN — Medication 60 MILLIGRAM(S): at 10:19

## 2024-01-16 RX ADMIN — Medication 1334 MILLIGRAM(S): at 10:18

## 2024-01-16 RX ADMIN — Medication 5 UNIT(S): at 17:34

## 2024-01-16 RX ADMIN — Medication 100 MILLIGRAM(S): at 16:35

## 2024-01-16 RX ADMIN — Medication 1: at 17:34

## 2024-01-16 RX ADMIN — Medication 60 MILLIGRAM(S): at 16:37

## 2024-01-16 RX ADMIN — HEPARIN SODIUM 5000 UNIT(S): 5000 INJECTION INTRAVENOUS; SUBCUTANEOUS at 05:35

## 2024-01-16 RX ADMIN — CARVEDILOL PHOSPHATE 12.5 MILLIGRAM(S): 80 CAPSULE, EXTENDED RELEASE ORAL at 10:19

## 2024-01-16 RX ADMIN — Medication 1334 MILLIGRAM(S): at 16:35

## 2024-01-16 RX ADMIN — Medication 5 UNIT(S): at 15:24

## 2024-01-16 NOTE — CONSULT NOTE ADULT - ATTENDING COMMENTS
73M DM2 uncontrolled HbA1c 14% poor historian but admits to being off insulin a few months.  From review of EMR had followed with Dr. Murphy on various insulins but last seen by endocrine in 2022, likely ran out of insulins. Supposed to be on basal bolus, Endocrine team consulted for uncontrolled diabetes. Patient is high risk with high level decision making due to uncontrolled diabetes which places patient at high risk for cardiovascular and cerebrovascular events. Patient with lability of glucose requiring close monitoring and insulin adjustments.    Lucrecia Sharma MD  Division of Endocrinology  Pager: [phone]    If after 6PM or before 9AM, or on weekends/holidays, please call endocrine answering service for assistance ([phone]).  For nonurgent matters email [email or web address] for assistance. 73M DM2 uncontrolled HbA1c 14% poor historian but admits to being off insulin a few months.  From review of EMR had followed with Dr. Murphy on various insulins but last seen by endocrine in 2022, likely ran out of insulins. Supposed to be on basal bolus, Endocrine team consulted for uncontrolled diabetes. Patient is high risk with high level decision making due to uncontrolled diabetes which places patient at high risk for cardiovascular and cerebrovascular events. Patient with lability of glucose requiring close monitoring and insulin adjustments.    Lucrecia Sharma MD  Division of Endocrinology  Pager: [phone]    If after 6PM or before 9AM, or on weekends/holidays, please call endocrine answering service for assistance ([phone]).  For nonurgent matters email [email or web address] for assistance.

## 2024-01-16 NOTE — PROGRESS NOTE ADULT - ASSESSMENT
81 yr old male with a pmh of HTN, HLD, T2DM on insulin, ESRD on HD MWF present with anemia    ESRD on HD MWF  via avf  last hd 1/15  plan for HD 1/17  continue HD schedule   dc planning per team  consent in chart  renal diet    anemia  likely sec to ckd  epo with hd  transfuse to keep hb>8  monitor    htn  fluctuating   resume home meds  monitor    ckd-mbd  ok pth, phos level  monitor phos and calcium daily 81 yr old male with a pmh of HTN, HLD, T2DM on insulin, ESRD on HD MWF present with anemia    ESRD on HD MWF  via avf  last hd 1/15  plan for HD 1/17  continue HD schedule   dc planning per team  consent in chart  renal diet    anemia  likely sec to ckd  epo with hd  transfuse to keep hb>8  monitor    htn  fluctuating but better controlled  monitor    ckd-mbd  ok pth, phos level  monitor phos and calcium daily

## 2024-01-16 NOTE — CHART NOTE - NSCHARTNOTEFT_GEN_A_CORE
ACP Medicine Night Coverage     On chart review, FS noted to be 39. Discussed with ED nurse, hypoglycemia protocol in place.   Patient seen and assessed at bedside with colleague. Patient noted to be AOx3 and follows commands however somnolent and frequently falls asleep. Patient notes feeling more tired than normal. Denies CP, SOB, HA, Abd pain.     Vital Signs Last 24 Hrs  T(C): 34.9 (16 Jan 2024 22:30), Max: 37 (16 Jan 2024 15:53)  T(F): 94.8 (16 Jan 2024 22:30), Max: 98.6 (16 Jan 2024 15:53)  HR: 40 (16 Jan 2024 22:30) (40 - 64)  BP: 139/57 (16 Jan 2024 22:30) (113/67 - 155/76)  RR: 14 (16 Jan 2024 22:30) (14 - 20)  SpO2: 100% (16 Jan 2024 22:30) (98% - 100%)    PHYSICAL EXAM:  General: AOx3 however somnolent. NAD.   Head: Normocephalic, atraumatic.    Heart: RRR.  Normal S1 and S2.  No murmurs, rubs, or gallops.    Lungs: Nonlabored breathing.  Good inspiratory effort. CTAB     Skin: Warm and dry.   Neuro: A&Ox3.  Strength and sensation in tact.     Assessment:   74 y/o male w/ HTN, HLD, T2DM on insulin, ESRD (HD on MWF via R arm AVF. AVF creation surgery in 9/2022, on HD presenting to the emergency room from the dialysis center 2/2 hypertensive urgency and hyperglycemia 2/2 to medication non-compliance. Now noted to be hypoglycemic, hypothermic, and somnolent.     #Hypoglycemia  [ ] Hypoglycemia protocol activated with improvement in FS to 157.   [ ]    #Hypothermia to 94.7 Rectal   [ ] CBC, BMP w/ Mg/phos, Serum lactate and VBG ordered. Will f/u   [ ] TSH, T3/T4 and cortisol levels ordered. Will f/u.   [ ] UA, Urine Culture and Blood Cultures x2 ordered. Will f/u.   [ ] RVP ordered. Will f/u.   [ ] CXR ordered will f/u.   [ ] hypothermia blanket ordered.    #Somnolence   [ ] CTH Head ordered to r/o stroke or any acute pathology. Will f/u.       Discussed case with Dr. Chaitanya Nicole, additionally recommended discontinuing standing bedtime lantus and pre-meal 5 units admelog.      Marilyn Shell PA-C  Department of Medicine   [id number] ACP Medicine Night Coverage     On chart review, FS noted to be 39. Discussed with ED nurse, hypoglycemia protocol in place.   Patient seen and assessed at bedside with colleague. Patient noted to be AOx3 and follows commands however somnolent and frequently falls asleep. Patient notes feeling more tired than normal. Patient notes new cough productive cough x1 day. Denies CP, SOB, HA, Abd pain.     Vital Signs Last 24 Hrs  T(C): 34.9 (16 Jan 2024 22:30), Max: 37 (16 Jan 2024 15:53)  T(F): 94.8 (16 Jan 2024 22:30), Max: 98.6 (16 Jan 2024 15:53)  HR: 40 (16 Jan 2024 22:30) (40 - 64)  BP: 139/57 (16 Jan 2024 22:30) (113/67 - 155/76)  RR: 14 (16 Jan 2024 22:30) (14 - 20)  SpO2: 100% (16 Jan 2024 22:30) (98% - 100%)    PHYSICAL EXAM:  General: AOx3 however somnolent. NAD.   Head: Normocephalic, atraumatic.    Heart: RRR.  Normal S1 and S2.  No murmurs, rubs, or gallops.    Lungs: Nonlabored breathing.  Good inspiratory effort. CTAB.   Abdomen: NT/ND.   Skin: Warm and dry.   Neuro: A&Ox3.  Strength and sensation in tact.     Assessment:   72 y/o male w/ HTN, HLD, T2DM on insulin, ESRD (HD on MWF via R arm AVF. AVF creation surgery in 9/2022, on HD presenting to the emergency room from the dialysis center 2/2 hypertensive urgency and hyperglycemia 2/2 to medication non-compliance. Now noted to be hypoglycemic, hypothermic, and somnolent.     #Hypoglycemia  [ ] Hypoglycemia protocol activated with improvement in FS to 205 > 157.     #Hypothermia to 94.7 Rectal   [ ] CBC, BMP w/ Mg/phos, Serum lactate and VBG ordered. Will f/u   [ ] TSH, T3/T4 and cortisol levels ordered. Will f/u.   [ ] UA, Urine Culture and Blood Cultures x2 ordered. Will f/u.   [ ] RVP ordered. Will f/u.   [ ] CXR ordered will f/u.   [ ] hypothermia blanket ordered.    #Somnolence   [ ] CTH Head ordered to r/o stroke or any acute pathology. Will f/u.      Discussed case with Dr. Chaitanya Nicole, additionally recommended discontinuing standing bedtime lantus and pre-meal 5 units admelog.      Marilyn Shell PA-C  Department of Medicine   [id number]    Addendum:   1 hour repeat FS noted to be 109.   Patient seen and assessed at bedside, more awake however still lethargic.   Gentle IV hydration of D5/NS started x 5 hours. Will monitor FS q2 hours.   Will continue to monitor closely  Will relay overnight events to AM team. ACP Medicine Night Coverage     On chart review, FS noted to be 39. Discussed with ED nurse, hypoglycemia protocol in place.   Patient seen and assessed at bedside with colleague. Patient noted to be AOx3 and follows commands however somnolent and frequently falls asleep. Patient notes feeling more tired than normal. Patient notes new cough productive cough x1 day. Denies CP, SOB, HA, Abd pain.     Vital Signs Last 24 Hrs  T(C): 34.9 (16 Jan 2024 22:30), Max: 37 (16 Jan 2024 15:53)  T(F): 94.8 (16 Jan 2024 22:30), Max: 98.6 (16 Jan 2024 15:53)  HR: 40 (16 Jan 2024 22:30) (40 - 64)  BP: 139/57 (16 Jan 2024 22:30) (113/67 - 155/76)  RR: 14 (16 Jan 2024 22:30) (14 - 20)  SpO2: 100% (16 Jan 2024 22:30) (98% - 100%)    PHYSICAL EXAM:  General: AOx3 however somnolent. NAD.   Head: Normocephalic, atraumatic.    Heart: +Bradycardic, regular rhythm.  Normal S1 and S2.  No murmurs, rubs, or gallops noted.   Lungs: Nonlabored breathing.  Good inspiratory effort. CTAB.   Abdomen: NT/ND. BS active.   Skin: Warm and dry.   Neuro: A&Ox3.  Strength and sensation in tact.     Assessment:   74 y/o male w/ HTN, HLD, T2DM on insulin, ESRD (HD on MWF via R arm AVF. AVF creation surgery in 9/2022, on HD presenting to the emergency room from the dialysis center 2/2 hypertensive urgency and hyperglycemia 2/2 to medication non-compliance. Now noted to be hypoglycemic, hypothermic, and somnolent.     #Hypoglycemia  [ ] Hypoglycemia protocol activated with improvement in FS to 205 > 157.     #Hypothermia to 94.7 Rectal   [ ] CBC, BMP w/ Mg/phos, Serum lactate and VBG ordered. Will f/u   [ ] TSH, T3/T4 and cortisol levels ordered. Will f/u.   [ ] UA, Urine Culture and Blood Cultures x2 ordered. Will f/u.   [ ] RVP ordered. Will f/u.   [ ] CXR ordered will f/u.   [ ] hypothermia blanket ordered.    #Somnolence   [ ] CTH Head ordered to r/o stroke or any acute pathology. Will f/u.    #Cough   [ ] consider cough suppressant once patient is less somnolent.       Discussed case with Dr. Chaitanya Nicole, additionally recommended discontinuing standing bedtime lantus and pre-meal 5 units admelog.      Marilyn Shell PA-C  Department of Medicine   [id number]    Addendum:   1 hour repeat FS noted to be 109 from 205.   Patient seen and assessed at bedside, more awake however still lethargic.   Gentle IV hydration of D5/NS started x 5 hours. Will monitor FS closely,   Will continue to monitor closely  Will relay overnight events to AM team. [facility] Medicine Night Coverage     On chart review, FS noted to be 39. Discussed with ED nurse, hypoglycemia protocol in place.   Patient seen and assessed at bedside with colleague. Patient noted to be AOx3 and follows commands however somnolent and frequently falls asleep. Patient notes feeling more tired than normal. Patient notes new cough productive cough x1 day. Denies CP, SOB, HA, Abd pain.     Vital Signs Last 24 Hrs  T(C): 34.9 (16 Jan 2024 22:30), Max: 37 (16 Jan 2024 15:53)  T(F): 94.8 (16 Jan 2024 22:30), Max: 98.6 (16 Jan 2024 15:53)  HR: 40 (16 Jan 2024 22:30) (40 - 64)  BP: 139/57 (16 Jan 2024 22:30) (113/67 - 155/76)  RR: 14 (16 Jan 2024 22:30) (14 - 20)  SpO2: 100% (16 Jan 2024 22:30) (98% - 100%)    PHYSICAL EXAM:  General: AOx3 however somnolent. NAD.   Head: Normocephalic, atraumatic.    Heart: +Bradycardic, regular rhythm.  Normal S1 and S2.  No murmurs, rubs, or gallops noted.   Lungs: Nonlabored breathing.  Good inspiratory effort. CTAB.   Abdomen: NT/ND. BS active.   Skin: Warm and dry.   Neuro: A&Ox3.  Strength and sensation in tact.     Assessment:   72 y/o male w/ HTN, HLD, T2DM on insulin, ESRD (HD on MWF via R arm AVF. AVF creation surgery in 9/2022, on HD presenting to the emergency room from the dialysis center 2/2 hypertensive urgency and hyperglycemia 2/2 to medication non-compliance. Now noted to be hypoglycemic, hypothermic, and somnolent.     #Hypoglycemia  [ ] Hypoglycemia protocol activated with improvement in FS to 205 > 157.   [ ] Follow-up in AM w/ endocrinology regarding medication adjustment.     #Hypothermia to 94.7 Rectal   [ ] CBC, BMP w/ Mg/phos, Serum lactate and VBG ordered. Will f/u   [ ] TSH, T3/T4 and cortisol levels ordered. Will f/u.   [ ] UA, Urine Culture and Blood Cultures x2 ordered. Will f/u.   [ ] RVP ordered. Will f/u.   [ ] CXR ordered will f/u.   [ ] hypothermia blanket ordered.    #Somnolence   [ ] CTH Head ordered to r/o stroke or any acute pathology. Will f/u.    #Cough   [ ] consider cough suppressant once patient is less somnolent.       Discussed case with Dr. Chaitanya Nicole, additionally recommended discontinuing standing bedtime lantus and pre-meal 5 units admelog.      Marilyn Shell PA-C  Department of Medicine   [id number]    Addendum:   1 hour repeat FS noted to be 109 from 205.   Patient seen and assessed at bedside, more awake however still lethargic.   Gentle IV hydration of D5/NS started x 5 hours. Will monitor FS closely,   Will continue to monitor closely  Will relay overnight events to AM team. [facility] Medicine Night Coverage     On chart review, FS noted to be 39. Discussed with ED nurse, hypoglycemia protocol in place.   Patient seen and assessed at bedside with colleague. Patient noted to be AOx3 and follows commands however somnolent and frequently falls asleep. Patient notes feeling more tired than normal. Patient notes new cough productive cough x1 day. Denies CP, SOB, HA, Abd pain.     Vital Signs Last 24 Hrs  T(C): 34.9 (16 Jan 2024 22:30), Max: 37 (16 Jan 2024 15:53)  T(F): 94.8 (16 Jan 2024 22:30), Max: 98.6 (16 Jan 2024 15:53)  HR: 40 (16 Jan 2024 22:30) (40 - 64)  BP: 139/57 (16 Jan 2024 22:30) (113/67 - 155/76)  RR: 14 (16 Jan 2024 22:30) (14 - 20)  SpO2: 100% (16 Jan 2024 22:30) (98% - 100%)    PHYSICAL EXAM:  General: AOx3 however somnolent. NAD.   Head: Normocephalic, atraumatic.    Heart: +Bradycardic, regular rhythm.  Normal S1 and S2.  No murmurs, rubs, or gallops noted.   Lungs: Nonlabored breathing.  Good inspiratory effort. CTAB.   Abdomen: NT/ND. BS active.   Skin: Warm and dry.   Neuro: A&Ox3.  Strength and sensation in tact.     Assessment:   72 y/o male w/ HTN, HLD, T2DM on insulin, ESRD (HD on MWF via R arm AVF. AVF creation surgery in 9/2022, on HD presenting to the emergency room from the dialysis center 2/2 hypertensive urgency and hyperglycemia 2/2 to medication non-compliance. Now noted to be hypoglycemic, hypothermic, and somnolent.     #Hypoglycemia  [ ] Hypoglycemia protocol activated with improvement in FS to 205 > 157.   [ ] Follow-up in AM w/ endocrinology regarding T2DM medication adjustment.     #Hypothermia to 94.7 Rectal   [ ] CBC, BMP w/ Mg/phos, Serum lactate and VBG ordered. Will f/u   [ ] TSH, T3/T4 and cortisol levels ordered. Will f/u.   [ ] UA, Urine Culture and Blood Cultures x2 ordered. Will f/u.   [ ] RVP ordered. Will f/u.   [ ] CXR ordered will f/u.   [ ] hypothermia blanket ordered.    #Somnolence   [ ] CTH Head ordered to r/o stroke or any acute pathology. Will f/u.    #Cough   [ ] consider cough suppressant once patient is less somnolent.       Discussed case with Dr. Chaitanya Nicole, additionally recommended discontinuing standing bedtime lantus and pre-meal 5 units admelog.      Marilyn Shell PA-C  Department of Medicine   [id number]    Addendum:   1 hour repeat FS noted to be 109 from 205.   Patient seen and assessed at bedside, more awake in comparison to prior however still notably somnolent.   Gentle IV hydration of D5/NS started x 5 hours. Will monitor FS closely,   Will continue to monitor closely  Will relay overnight events to AM team.

## 2024-01-16 NOTE — PROVIDER CONTACT NOTE (OTHER) - ACTION/TREATMENT ORDERED:
per provider, rn to administer ordered hydralazine; see emar.   Bedtime FS taken; q1 FS order discontinued.

## 2024-01-16 NOTE — PROVIDER CONTACT NOTE (OTHER) - BACKGROUND
Primary hypertension
pt sent to ED from outpt dialysis due to hypertensive urgency and hyperglycemia.

## 2024-01-16 NOTE — PROGRESS NOTE ADULT - ASSESSMENT
72 y/o male w/ HTN, HLD, T2DM on insulin, ESRD (HD on MWF via R arm AVF. AVF creation surgery in 9/2022, HD since 12/2022)  for a little over a year, h/o poor compliance w meds and frequent admission, now being sent from the dialysis center 2/2 hypertensive urgency and hyperglycemia    Ptn is asymptomatic. FS in the ED close to 500. BP at the dialysis center: 200/80  Denies fevers, chills, nausea, vomiting, dizziness, chest pain, SOB, abdominal pain, dysuria, hematuria.     Hypertensive Urgency  ESRD on HD  Anemia, chronic  DKA    -HD as per renal  - anemia  -BP has stabilized, DKA resolved,  FS are now in range. ptn is noncompliant w meds.   - poor medication compliance :"  i stopped using my meds"    dvt ppx w[facility]    stable for DC home

## 2024-01-16 NOTE — PROGRESS NOTE ADULT - SUBJECTIVE AND OBJECTIVE BOX
Patient is a 73y old  Male who presents with a chief complaint of     SUBJECTIVE / OVERNIGHT EVENTS: BP has stabilized, DKA resolved,  FS are now in range. ptn is noncompliant w meds.     MEDICATIONS  (STANDING):  atorvastatin 20 milliGRAM(s) Oral at bedtime  calamine/zinc oxide Lotion 1 Application(s) Topical once  calcium acetate 1334 milliGRAM(s) Oral two times a day with meals  carvedilol 12.5 milliGRAM(s) Oral every 12 hours  chlorhexidine 4% Liquid 1 Application(s) Topical daily  dextrose 5%. 1000 milliLiter(s) (50 mL/Hr) IV Continuous <Continuous>  dextrose 5%. 1000 milliLiter(s) (100 mL/Hr) IV Continuous <Continuous>  dextrose 50% Injectable 25 Gram(s) IV Push once  dextrose 50% Injectable 12.5 Gram(s) IV Push once  dextrose 50% Injectable 25 Gram(s) IV Push once  glucagon  Injectable 1 milliGRAM(s) IntraMuscular once  heparin   Injectable 5000 Unit(s) SubCutaneous every 12 hours  hydrALAZINE 100 milliGRAM(s) Oral three times a day  insulin glargine Injectable (LANTUS) 15 Unit(s) SubCutaneous at bedtime  insulin lispro (ADMELOG) corrective regimen sliding scale   SubCutaneous at bedtime  insulin lispro (ADMELOG) corrective regimen sliding scale   SubCutaneous three times a day before meals  insulin lispro Injectable (ADMELOG) 5 Unit(s) SubCutaneous three times a day before meals  NIFEdipine XL 60 milliGRAM(s) Oral two times a day    MEDICATIONS  (PRN):  dextrose Oral Gel 15 Gram(s) Oral once PRN Blood Glucose LESS THAN 70 milliGRAM(s)/deciliter  sodium chloride 0.9% Bolus. 100 milliLiter(s) IV Bolus every 5 minutes PRN SBP LESS THAN or EQUAL to 90 mmHg      Vital Signs Last 24 Hrs  T(F): 98.3 (01-16-24 @ 20:45), Max: 98.6 (01-16-24 @ 15:53)  HR: 55 (01-16-24 @ 20:45) (44 - 64)  BP: 113/67 (01-16-24 @ 20:45) (113/67 - 159/64)  RR: 20 (01-16-24 @ 20:45) (15 - 20)  SpO2: 98% (01-16-24 @ 20:45) (98% - 100%)  Telemetry:   CAPILLARY BLOOD GLUCOSE      POCT Blood Glucose.: 175 mg/dL (16 Jan 2024 17:08)  POCT Blood Glucose.: 147 mg/dL (16 Jan 2024 15:17)  POCT Blood Glucose.: 125 mg/dL (16 Jan 2024 12:46)  POCT Blood Glucose.: 107 mg/dL (16 Jan 2024 09:15)  POCT Blood Glucose.: 138 mg/dL (16 Jan 2024 07:58)  POCT Blood Glucose.: 137 mg/dL (16 Jan 2024 06:36)  POCT Blood Glucose.: 87 mg/dL (16 Jan 2024 05:47)  POCT Blood Glucose.: 279 mg/dL (15 Deuce 2024 23:26)    I&O's Summary    16 Jan 2024 07:01  -  16 Jan 2024 21:40  --------------------------------------------------------  IN: 400 mL / OUT: 1400 mL / NET: -1000 mL        PHYSICAL EXAM:  GENERAL: NAD, well-developed  HEAD:  Atraumatic, Normocephalic  EYES: EOMI, PERRLA, conjunctiva and sclera clear  NECK: Supple, No JVD  CHEST/LUNG: Clear to auscultation bilaterally; No wheeze  HEART: Regular rate and rhythm; No murmurs, rubs, or gallops  ABDOMEN: Soft, Nontender, Nondistended; Bowel sounds present  EXTREMITIES:  2+ Peripheral Pulses, No clubbing, cyanosis, or edema  PSYCH: AAOx3  NEUROLOGY: non-focal  SKIN: No rashes or lesions    LABS:                        10.4   6.61  )-----------( 226      ( 16 Jan 2024 06:22 )             34.6     01-16    135  |  98  |  36<H>  ----------------------------<  139<H>  4.0   |  28  |  6.17<H>    Ca    8.8      16 Jan 2024 06:22  Phos  1.8     01-16  Mg     2.60     01-16    TPro  6.9  /  Alb  3.8  /  TBili  0.4  /  DBili  x   /  AST  26  /  ALT  9   /  AlkPhos  132<H>  01-15    PT/INR - ( 15 Deuce 2024 13:36 )   PT: 11.6 sec;   INR: 1.03 ratio         PTT - ( 15 Deuce 2024 13:36 )  PTT:31.6 sec      Urinalysis Basic - ( 16 Jan 2024 06:22 )    Color: x / Appearance: x / SG: x / pH: x  Gluc: 139 mg/dL / Ketone: x  / Bili: x / Urobili: x   Blood: x / Protein: x / Nitrite: x   Leuk Esterase: x / RBC: x / WBC x   Sq Epi: x / Non Sq Epi: x / Bacteria: x

## 2024-01-16 NOTE — CONSULT NOTE ADULT - ASSESSMENT
74 y/o male w/ HTN, HLD, T2DM on insulin, ESRD (HD on MWF via R arm AVF. AVF creation surgery in 9/2022, HD since 12/2022)  for a little over a year, h/o poor compliance w meds and frequent admission, now being sent from the dialysis center 2/2 hypertensive urgency and hyperglycemia. Endocrinology consulted for management of T2DM.    Poorly controlled T2DM with hyperglycemia  - HbA1c: 14  - Home Regimen:   - Endocrinologist:  PLAN  - Hold oral DM agents while inpatient  - Start Lantus  units at bedtime. DO NOT HOLD IF NPO.  - Start Admelog  units TID pre-meal. HOLD IF NPO.  - Use moderate/Use low dose Admelog correction scale pre-meal  - Use moderate/Use low dose Admelog correction scale at bedtime  - Fingerstick BG before meals and bedtime  - Goal -180  - Carbohydrate consistent diet  - RD consult  Discharge plan:  - Discharge medications: ************************  - Patient to call doctor with persistent high or low BG at home.   - Ensure patient has glucometer, test strips and lancets on discharge.  - Recommend routine outpatient ophthalmology, podiatry and endocrinology f/u    HTN  - Home regimen: coreg 25mg bid  PLAN  - Can check urine microalbumin outpatient  - Outpatient goal BP <130/80. Management per primary team.    HLD  - Home regimen: atorvastatin 20mg daily  PLAN  - Continue atorvastatin 20mg daily per primary team  - Can check lipid profile if not done recently    Discussed with primary team.    Jian Poole MD, Endocrinology Fellow  Pager [phone] from 9am to 5pm. After hours and on weekends, please call [phone].   72 y/o male w/ HTN, HLD, T2DM on insulin, ESRD (HD on MWF via R arm AVF. AVF creation surgery in 9/2022, HD since 12/2022)  for a little over a year, h/o poor compliance w meds and frequent admission, now being sent from the dialysis center 2/2 hypertensive urgency and hyperglycemia. Endocrinology consulted for management of T2DM.    Poorly controlled T2DM with hyperglycemia  - HbA1c: 14  - Home Regimen:   - Endocrinologist:  PLAN  - Hold oral DM agents while inpatient  - Start Lantus  units at bedtime. DO NOT HOLD IF NPO.  - Start Admelog  units TID pre-meal. HOLD IF NPO.  - Use moderate/Use low dose Admelog correction scale pre-meal  - Use moderate/Use low dose Admelog correction scale at bedtime  - Fingerstick BG before meals and bedtime  - Goal -180  - Carbohydrate consistent diet  - RD consult  Discharge plan:  - Discharge medications: ************************  - Patient to call doctor with persistent high or low BG at home.   - Ensure patient has glucometer, test strips and lancets on discharge.  - Recommend routine outpatient ophthalmology, podiatry and endocrinology f/u    HTN  - Home regimen: coreg 25mg bid  PLAN  - Can check urine microalbumin outpatient  - Outpatient goal BP <130/80. Management per primary team.    HLD  - Home regimen: atorvastatin 20mg daily  PLAN  - Continue atorvastatin 20mg daily per primary team  - Can check lipid profile if not done recently    Discussed with primary team.    Jian Poole MD, Endocrinology Fellow  Pager [phone] from 9am to 5pm. After hours and on weekends, please call [phone].   74 y/o male w/ HTN, HLD, T2DM on insulin, ESRD (HD on MWF via R arm AVF. AVF creation surgery in 9/2022, HD since 12/2022)  for a little over a year, h/o poor compliance w meds and frequent admission, now being sent from the dialysis center 2/2 hypertensive urgency and hyperglycemia. Endocrinology consulted for management of T2DM.    Poorly controlled T2DM with hyperglycemia  - HbA1c: 14  - Home Regimen: not currently on medicaiton  - Endocrinologist: patient did not report he has an endocrinologist  PLAN  - Start Lantus  units at bedtime. DO NOT HOLD IF NPO.  - Start Admelog  units TID pre-meal. HOLD IF NPO.  - Use moderate/Use low dose Admelog correction scale pre-meal  - Use moderate/Use low dose Admelog correction scale at bedtime  - Fingerstick BG before meals and bedtime  - Goal -180  - Carbohydrate consistent diet  - RD consult  Discharge plan:  - Discharge medications: ************************  - Patient to call doctor with persistent high or low BG at home.   - Ensure patient has glucometer, test strips and lancets on discharge.  - Recommend routine outpatient ophthalmology, podiatry and endocrinology f/u    HTN  - Home regimen: coreg 25mg bid  PLAN  - Can check urine microalbumin outpatient  - Outpatient goal BP <130/80. Management per primary team.    HLD  - Home regimen: atorvastatin 20mg daily  PLAN  - Continue atorvastatin 20mg daily per primary team  - Can check lipid profile if not done recently    Discussed with primary team.    Jian Poole MD, Endocrinology Fellow  Pager [phone] from 9am to 5pm. After hours and on weekends, please call [phone].   74 y/o male w/ HTN, HLD, T2DM on insulin, ESRD (HD on MWF via R arm AVF. AVF creation surgery in 9/2022, HD since 12/2022)  for a little over a year, h/o poor compliance w meds and frequent admission, now being sent from the dialysis center 2/2 hypertensive urgency and hyperglycemia. Endocrinology consulted for management of T2DM.    Poorly controlled T2DM with hyperglycemia  - HbA1c: 14  - Home Regimen: not currently on medicaiton  - Endocrinologist: patient did not report he has an endocrinologist  PLAN  - Start Lantus  units at bedtime. DO NOT HOLD IF NPO.  - Start Admelog  units TID pre-meal. HOLD IF NPO.  - Use moderate/Use low dose Admelog correction scale pre-meal  - Use moderate/Use low dose Admelog correction scale at bedtime  - Fingerstick BG before meals and bedtime  - Goal -180  - Carbohydrate consistent diet  - RD consult  Discharge plan:  - Discharge medications: ************************  - Patient to call doctor with persistent high or low BG at home.   - Ensure patient has glucometer, test strips and lancets on discharge.  - Recommend routine outpatient ophthalmology, podiatry and endocrinology f/u    HTN  - Home regimen: coreg 25mg bid  PLAN  - Can check urine microalbumin outpatient  - Outpatient goal BP <130/80. Management per primary team.    HLD  - Home regimen: atorvastatin 20mg daily  PLAN  - Continue atorvastatin 20mg daily per primary team  - Can check lipid profile if not done recently    Discussed with primary team.    Jian Poole MD, Endocrinology Fellow  Pager [phone] from 9am to 5pm. After hours and on weekends, please call [phone].   74 y/o male w/ HTN, HLD, T2DM on insulin, ESRD (HD on MWF via R arm AVF. AVF creation surgery in 9/2022, HD since 12/2022)  for a little over a year, h/o poor compliance w meds and frequent admission, now being sent from the dialysis center 2/2 hypertensive urgency and hyperglycemia. Endocrinology consulted for management of T2DM.    Poorly controlled T2DM with hyperglycemia  - HbA1c: 14  - Home Regimen: not currently on medication  - Endocrinologist: Dr. Tony  PLAN  - Start Lantus 15 units at bedtime. DO NOT HOLD IF NPO.  - Start Admelog 5 units TID pre-meal. HOLD IF NPO.  - Use low dose Admelog correction scale pre-meal  - Use low dose Admelog correction scale at bedtime  - Fingerstick BG before meals and bedtime  - Goal -180  - hypoglycemia protocol prn  - Carbohydrate consistent diet  - RD consult  Discharge plan:  - Discharge medications: basal/bolus, doses tbd  - Patient to call doctor with persistent high or low BG at home.   - Ensure patient has glucometer, test strips and lancets on discharge.  - Recommend routine outpatient ophthalmology, podiatry and endocrinology f/u    HTN  - Home regimen: coreg 25mg bid  PLAN  - Can check urine microalbumin outpatient  - Outpatient goal BP <130/80. Management per primary team.    HLD  - Home regimen: atorvastatin 20mg daily  PLAN  - Continue atorvastatin 20mg daily per primary team  - Can check lipid profile if not done recently    Discussed with primary team.    Jian Poole MD, Endocrinology Fellow  Pager [phone] from 9am to 5pm. After hours and on weekends, please call [phone].   74 y/o male w/ HTN, HLD, T2DM on insulin, ESRD (HD on MWF via R arm AVF. AVF creation surgery in 9/2022, HD since 12/2022)  for a little over a year, h/o poor compliance w meds and frequent admission, now being sent from the dialysis center 2/2 hypertensive urgency and hyperglycemia. Endocrinology consulted for management of T2DM.    Poorly controlled T2DM with hyperglycemia  - HbA1c: 14  - Home Regimen: not currently on medication  - Endocrinologist: Dr. Tony  PLAN  - Start Lantus 15 units at bedtime. DO NOT HOLD IF NPO.  - Start Admelog 5 units TID pre-meal. HOLD IF NPO.  - Use low dose Admelog correction scale pre-meal  - Use low dose Admelog correction scale at bedtime  - Fingerstick BG before meals and bedtime  - Goal -180  - hypoglycemia protocol prn  - Carbohydrate consistent diet  - RD consult  Discharge plan:  - Discharge medications: basal/bolus, doses tbd  - Patient to call doctor with persistent high or low BG at home.   - Ensure patient has glucometer, test strips and lancets on discharge.  - Recommend routine outpatient ophthalmology, podiatry and endocrinology f/u    HTN  - Home regimen: coreg 25mg bid  PLAN  - Can check urine microalbumin outpatient  - Outpatient goal BP <130/80. Management per primary team.    HLD  - Home regimen: atorvastatin 20mg daily  PLAN  - Continue atorvastatin 20mg daily per primary team  - Can check lipid profile if not done recently    Discussed with primary team.    Jian Poole MD, Endocrinology Fellow  Pager [phone] from 9am to 5pm. After hours and on weekends, please call [phone].   72 y/o male w/ HTN, HLD, T2DM on insulin, ESRD (HD on MWF via R arm AVF. AVF creation surgery in 9/2022, HD since 12/2022)  for a little over a year, h/o poor compliance w meds and frequent admission, now being sent from the dialysis center 2/2 hypertensive urgency and hyperglycemia. Endocrinology consulted for management of T2DM.    Poorly controlled T2DM with hyperglycemia  - HbA1c: 14  - Home Regimen: not currently on medication, 9/22 and changed from novolin N 8/7 to tresiba 14 units daily and lyumjev 3-11 units sliding scale with meals, had novolog 70/30 filled 4/23.  - Endocrinologist: Dr. Tony  PLAN  - Start Lantus 15 units at bedtime. DO NOT HOLD IF NPO.  - Start Admelog 5 units TID pre-meal. HOLD IF NPO.  - Use low dose Admelog correction scale pre-meal  - Use low dose Admelog correction scale at bedtime  - Fingerstick BG before meals and bedtime  - Goal -180  - hypoglycemia protocol prn  - Carbohydrate consistent diet  - RD consult  Discharge plan:  - Discharge medications: basal/bolus, doses tbd  - Patient to call doctor with persistent high or low BG at home.   - Ensure patient has glucometer, test strips and lancets on discharge.  - Recommend routine outpatient ophthalmology, podiatry and endocrinology f/u    HTN  - Home regimen: coreg 25mg bid  PLAN  - Can check urine microalbumin outpatient  - Outpatient goal BP <130/80. Management per primary team.    HLD  - Home regimen: atorvastatin 20mg daily  PLAN  - Continue atorvastatin 20mg daily per primary team  - Can check lipid profile if not done recently    Discussed with primary team.    Jian Poole MD, Endocrinology Fellow  Pager [phone] from 9am to 5pm. After hours and on weekends, please call [phone].   74 y/o male w/ HTN, HLD, T2DM on insulin, ESRD (HD on MWF via R arm AVF. AVF creation surgery in 9/2022, HD since 12/2022)  for a little over a year, h/o poor compliance w meds and frequent admission, now being sent from the dialysis center 2/2 hypertensive urgency and hyperglycemia. Endocrinology consulted for management of T2DM.    Poorly controlled T2DM with hyperglycemia  - HbA1c: 14  - Home Regimen: not currently on medication, 9/22 and changed from novolin N 8/7 to tresiba 14 units daily and lyumjev 3-11 units sliding scale with meals, had novolog 70/30 filled 4/23.  - Endocrinologist: Dr. Tony  PLAN  - Start Lantus 15 units at bedtime. DO NOT HOLD IF NPO.  - Start Admelog 5 units TID pre-meal. HOLD IF NPO.  - Use low dose Admelog correction scale pre-meal  - Use low dose Admelog correction scale at bedtime  - Fingerstick BG before meals and bedtime  - Goal -180  - hypoglycemia protocol prn  - Carbohydrate consistent diet  - RD consult  Discharge plan:  - Discharge medications: basal/bolus, doses tbd  - Patient to call doctor with persistent high or low BG at home.   - Ensure patient has glucometer, test strips and lancets on discharge.  - Recommend routine outpatient ophthalmology, podiatry and endocrinology f/u    HTN  - Home regimen: coreg 25mg bid  PLAN  - Can check urine microalbumin outpatient  - Outpatient goal BP <130/80. Management per primary team.    HLD  - Home regimen: atorvastatin 20mg daily  PLAN  - Continue atorvastatin 20mg daily per primary team  - Can check lipid profile if not done recently    Discussed with primary team.    Jian Poole MD, Endocrinology Fellow  Pager [phone] from 9am to 5pm. After hours and on weekends, please call [phone].

## 2024-01-16 NOTE — PROGRESS NOTE ADULT - SUBJECTIVE AND OBJECTIVE BOX
[facility]   MD ELIANE BHAGAT MD ANGELA WONG, PA Venitha Krishnan, NP    TEL:  OFFICE: [phone]  From 5pm-7am Answering Service [phone]    -- RENAL FOLLOW UP NOTE ---Date of Service 01-16-24 @ 15:39    Patient is a 73y old  Male who presents with a chief complaint of     Patient seen and examined at bedside. No chest pain/sob    VITALS:  T(F): 98 (01-16-24 @ 10:08), Max: 98.6 (01-15-24 @ 19:36)  HR: 54 (01-16-24 @ 10:08)  BP: 155/76 (01-16-24 @ 10:08)  RR: 18 (01-16-24 @ 10:08)  SpO2: 100% (01-16-24 @ 10:08)  Wt(kg): --    01-16 @ 07:01  -  01-16 @ 15:39  --------------------------------------------------------  IN: 400 mL / OUT: 1400 mL / NET: -1000 mL      Height (cm): 172.7 (01-16 @ 05:45)    PHYSICAL EXAM:  General: NAD  Neck: No JVD  Respiratory: CTAB, no wheezes, rales or rhonchi  Cardiovascular: S1, S2, RRR  Gastrointestinal: BS+, soft, NT/ND  Extremities: No peripheral edema    Hospital Medications:   MEDICATIONS  (STANDING):  atorvastatin 20 milliGRAM(s) Oral at bedtime  calcium acetate 1334 milliGRAM(s) Oral two times a day with meals  carvedilol 12.5 milliGRAM(s) Oral every 12 hours  chlorhexidine 4% Liquid 1 Application(s) Topical daily  dextrose 5%. 1000 milliLiter(s) (100 mL/Hr) IV Continuous <Continuous>  dextrose 5%. 1000 milliLiter(s) (50 mL/Hr) IV Continuous <Continuous>  dextrose 50% Injectable 12.5 Gram(s) IV Push once  dextrose 50% Injectable 25 Gram(s) IV Push once  dextrose 50% Injectable 25 Gram(s) IV Push once  glucagon  Injectable 1 milliGRAM(s) IntraMuscular once  heparin   Injectable 5000 Unit(s) SubCutaneous every 12 hours  hydrALAZINE 100 milliGRAM(s) Oral three times a day  insulin lispro (ADMELOG) corrective regimen sliding scale   SubCutaneous three times a day before meals  insulin lispro (ADMELOG) corrective regimen sliding scale   SubCutaneous at bedtime  insulin lispro Injectable (ADMELOG) 5 Unit(s) SubCutaneous three times a day before meals  NIFEdipine XL 60 milliGRAM(s) Oral two times a day      LABS:  01-16    135  |  98  |  36<H>  ----------------------------<  139<H>  4.0   |  28  |  6.17<H>    Ca    8.8      16 Jan 2024 06:22  Phos  1.8     01-16  Mg     2.60     01-16    TPro  6.9  /  Alb  3.8  /  TBili  0.4  /  DBili      /  AST  26  /  ALT  9   /  AlkPhos  132<H>  01-15    Creatinine Trend: 6.17 <--, 6.08 <--    Phosphorus: 1.8 mg/dL (01-16 @ 06:22)                              10.4   6.61  )-----------( 226      ( 16 Jan 2024 06:22 )             34.6     Urine Studies:  Urinalysis - [01-16-24 @ 06:22]      Color  / Appearance  / SG  / pH       Gluc 139 / Ketone   / Bili  / Urobili        Blood  / Protein  / Leuk Est  / Nitrite       RBC  / WBC  / Hyaline  / Gran  / Sq Epi  / Non Sq Epi  / Bacteria         HBsAb <3.0      [01-16-24 @ 06:22]  HBsAg Nonreact      [01-16-24 @ 06:22]  HBcAb Nonreact      [01-16-24 @ 06:22]  HCV 0.09, Nonreact      [01-16-24 @ 06:22]      RADIOLOGY & ADDITIONAL STUDIES:   [facility]   MD ELIANE BHAGAT MD ANGELA WONG, PA Venitha Krishnan, NP    TEL:  OFFICE: [phone]  From 5pm-7am Answering Service [phone]    -- RENAL FOLLOW UP NOTE ---Date of Service 01-16-24 @ 15:39    Patient is a 73y old  Male who presents with a chief complaint of     Patient seen and examined at bedside. No chest pain/sob    VITALS:  T(F): 98 (01-16-24 @ 10:08), Max: 98.6 (01-15-24 @ 19:36)  HR: 54 (01-16-24 @ 10:08)  BP: 155/76 (01-16-24 @ 10:08)  RR: 18 (01-16-24 @ 10:08)  SpO2: 100% (01-16-24 @ 10:08)  Wt(kg): --    01-16 @ 07:01  -  01-16 @ 15:39  --------------------------------------------------------  IN: 400 mL / OUT: 1400 mL / NET: -1000 mL      Height (cm): 172.7 (01-16 @ 05:45)    PHYSICAL EXAM:  General: NAD  Neck: No JVD  Respiratory: CTAB, no wheezes, rales or rhonchi  Cardiovascular: S1, S2, RRR  Gastrointestinal: BS+, soft, NT/ND  Extremities: No peripheral edema    Hospital Medications:   MEDICATIONS  (STANDING):  atorvastatin 20 milliGRAM(s) Oral at bedtime  calcium acetate 1334 milliGRAM(s) Oral two times a day with meals  carvedilol 12.5 milliGRAM(s) Oral every 12 hours  chlorhexidine 4% Liquid 1 Application(s) Topical daily  dextrose 5%. 1000 milliLiter(s) (100 mL/Hr) IV Continuous <Continuous>  dextrose 5%. 1000 milliLiter(s) (50 mL/Hr) IV Continuous <Continuous>  dextrose 50% Injectable 12.5 Gram(s) IV Push once  dextrose 50% Injectable 25 Gram(s) IV Push once  dextrose 50% Injectable 25 Gram(s) IV Push once  glucagon  Injectable 1 milliGRAM(s) IntraMuscular once  heparin   Injectable 5000 Unit(s) SubCutaneous every 12 hours  hydrALAZINE 100 milliGRAM(s) Oral three times a day  insulin lispro (ADMELOG) corrective regimen sliding scale   SubCutaneous three times a day before meals  insulin lispro (ADMELOG) corrective regimen sliding scale   SubCutaneous at bedtime  insulin lispro Injectable (ADMELOG) 5 Unit(s) SubCutaneous three times a day before meals  NIFEdipine XL 60 milliGRAM(s) Oral two times a day      LABS:  01-16    135  |  98  |  36<H>  ----------------------------<  139<H>  4.0   |  28  |  6.17<H>    Ca    8.8      16 Jan 2024 06:22  Phos  1.8     01-16  Mg     2.60     01-16    TPro  6.9  /  Alb  3.8  /  TBili  0.4  /  DBili      /  AST  26  /  ALT  9   /  AlkPhos  132<H>  01-15    Creatinine Trend: 6.17 <--, 6.08 <--    Phosphorus: 1.8 mg/dL (01-16 @ 06:22)                              10.4   6.61  )-----------( 226      ( 16 Jan 2024 06:22 )             34.6     Urine Studies:  Urinalysis - [01-16-24 @ 06:22]      Color  / Appearance  / SG  / pH       Gluc 139 / Ketone   / Bili  / Urobili        Blood  / Protein  / Leuk Est  / Nitrite       RBC  / WBC  / Hyaline  / Gran  / Sq Epi  / Non Sq Epi  / Bacteria         HBsAb <3.0      [01-16-24 @ 06:22]  HBsAg Nonreact      [01-16-24 @ 06:22]  HBcAb Nonreact      [01-16-24 @ 06:22]  HCV 0.09, Nonreact      [01-16-24 @ 06:22]      RADIOLOGY & ADDITIONAL STUDIES:

## 2024-01-16 NOTE — PROVIDER CONTACT NOTE (OTHER) - ACTION/TREATMENT ORDERED:
Provider made aware; at bedside evaluating patient. Provider made aware; at bedside evaluating patient. Will order EKG, labs, and hypothermia blanket.

## 2024-01-16 NOTE — CONSULT NOTE ADULT - SUBJECTIVE AND OBJECTIVE BOX
ENDOCRINE INITIAL CONSULT - t2dm    HPI:  74 y/o male w/ HTN, HLD, T2DM on insulin, ESRD (HD on MWF via R arm AVF. AVF creation surgery in 9/2022, HD since 12/2022)  for a little over a year, h/o poor compliance w meds and frequent admission, now being sent from the dialysis center 2/2 hypertensive urgency and hyperglycemia    Ptn is asymptomatic. FS in the ED close to 500. BP at the dialysis center: 200/80  Denies fevers, chills, nausea, vomiting, dizziness, chest pain, SOB, abdominal pain, dysuria, hematuria.  (15 Deuce 2024 13:47)      ENDOCRINE HISTORY   Diagnosed with DM:   Last HbA1c: 14  Endocrinologist:   Home DM Meds:  Adherence:  Microvascular complications: Renal, opthalmologic, neuropathy  Macrovascular complications:  SMBG:  Symptoms:  Hypoglycemia episodes:  BG at home:  Diet at home:  Appetite in hospital:  Exercise:  PMHx:  PSHx:  Family hx:  Social hx:  Insurance: [employer]are  [facility]      PAST MEDICAL & SURGICAL HISTORY:  Diabetes      Benign essential HTN      HLD (hyperlipidemia)      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      ESRD on hemodialysis      Arteriovenous fistula          FAMILY HISTORY:  FHx: diabetes mellitus (Father, Aunt)        Social History:      Home Medications:  atorvastatin 20 mg oral tablet: 1 tab(s) orally once a day (15 Deuce 2024 13:56)  calcium acetate 667 mg oral tablet: 2 tab(s) orally 3 times a day (15 Deuce 2024 15:43)  Coreg 25 mg oral tablet: 1 tab(s) orally 2 times a day (15 Deuce 2024 15:43)  donepezil 10 mg oral tablet: 1 tab(s) orally once a day (at bedtime) (15 Deuce 2024 15:43)      MEDICATIONS  (STANDING):  atorvastatin 20 milliGRAM(s) Oral at bedtime  calcium acetate 1334 milliGRAM(s) Oral two times a day with meals  carvedilol 12.5 milliGRAM(s) Oral every 12 hours  chlorhexidine 4% Liquid 1 Application(s) Topical daily  dextrose 5%. 1000 milliLiter(s) (100 mL/Hr) IV Continuous <Continuous>  dextrose 5%. 1000 milliLiter(s) (50 mL/Hr) IV Continuous <Continuous>  dextrose 50% Injectable 12.5 Gram(s) IV Push once  dextrose 50% Injectable 25 Gram(s) IV Push once  dextrose 50% Injectable 25 Gram(s) IV Push once  glucagon  Injectable 1 milliGRAM(s) IntraMuscular once  heparin   Injectable 5000 Unit(s) SubCutaneous every 12 hours  hydrALAZINE 100 milliGRAM(s) Oral three times a day  insulin lispro (ADMELOG) corrective regimen sliding scale   SubCutaneous at bedtime  insulin lispro (ADMELOG) corrective regimen sliding scale   SubCutaneous three times a day before meals  insulin lispro Injectable (ADMELOG) 5 Unit(s) SubCutaneous three times a day before meals  NIFEdipine XL 60 milliGRAM(s) Oral two times a day    MEDICATIONS  (PRN):  dextrose Oral Gel 15 Gram(s) Oral once PRN Blood Glucose LESS THAN 70 milliGRAM(s)/deciliter  sodium chloride 0.9% Bolus. 100 milliLiter(s) IV Bolus every 5 minutes PRN SBP LESS THAN or EQUAL to 90 mmHg      Allergies    No Known Allergies    Intolerances        REVIEW OF SYSTEMS  Constitutional: No fever  Eyes: No blurry vision  Neuro: No tremors  HEENT: No pain  Cardiovascular: No chest pain, palpitations  Respiratory: No SOB, no cough  GI: No nausea, vomiting, abdominal pain  : No dysuria  Skin: no rash  Psych: no depression  Endocrine: no polyuria, polydipsia  Hem/lymph: no swelling  Osteoporosis: no fractures  ALL OTHER SYSTEMS REVIEWED AND NEGATIVE     UNABLE TO OBTAIN     PHYSICAL EXAM   Vital Signs Last 24 Hrs  T(C): 36.7 (16 Jan 2024 10:08), Max: 37 (15 Deuce 2024 19:36)  T(F): 98 (16 Jan 2024 10:08), Max: 98.6 (15 Deuce 2024 19:36)  HR: 54 (16 Jan 2024 10:08) (44 - 59)  BP: 155/76 (16 Jan 2024 10:08) (135/61 - 196/56)  BP(mean): --  RR: 18 (16 Jan 2024 10:08) (15 - 18)  SpO2: 100% (16 Jan 2024 10:08) (98% - 100%)    Parameters below as of 16 Jan 2024 10:08  Patient On (Oxygen Delivery Method): room air      GENERAL: NAD, well-groomed, well-developed  EYES: No proptosis, no lid lag, anicteric  HEENT:  Atraumatic, Normocephalic, moist mucous membranes  THYROID: Normal size, no palpable nodules  RESPIRATORY: Clear to auscultation bilaterally; No rales, rhonchi, wheezing  CARDIOVASCULAR: Regular rate and rhythm; No murmurs; no peripheral edema  GI: Soft, nontender, non distended, normal bowel sounds  SKIN: Dry, intact, No rashes or lesions  MUSCULOSKELETAL: Full range of motion, normal strength  NEURO: sensation intact, extraocular movements intact, no tremor  PSYCH: Alert and oriented x 3, normal affect, normal mood  CUSHING'S SIGNS: no striae    CAPILLARY BLOOD GLUCOSE      POCT Blood Glucose.: 107 mg/dL (16 Jan 2024 09:15)  POCT Blood Glucose.: 138 mg/dL (16 Jan 2024 07:58)  POCT Blood Glucose.: 137 mg/dL (16 Jan 2024 06:36)  POCT Blood Glucose.: 87 mg/dL (16 Jan 2024 05:47)  POCT Blood Glucose.: 279 mg/dL (15 Deuce 2024 23:26)  POCT Blood Glucose.: 284 mg/dL (15 Deuce 2024 21:39)  POCT Blood Glucose.: 474 mg/dL (15 Deuce 2024 17:31)  POCT Blood Glucose.: 529 mg/dL (15 Deuce 2024 16:30)  POCT Blood Glucose.: 479 mg/dL (15 Educe 2024 15:21)  POCT Blood Glucose.: 566 mg/dL (15 Deuce 2024 13:37)  POCT Blood Glucose.: 538 mg/dL (15 Deuce 2024 13:36)  POCT Blood Glucose.: 497 mg/dL (15 Deuce 2024 11:40)      A1C with Estimated Average Glucose Result: 14.0 % (01-15-24 @ 13:36)                            10.4   6.61  )-----------( 226      ( 16 Jan 2024 06:22 )             34.6       01-16    135  |  98  |  36<H>  ----------------------------<  139<H>  4.0   |  28  |  6.17<H>    Ca    8.8      16 Jan 2024 06:22  Phos  1.8     01-16  Mg     2.60     01-16    TPro  6.9  /  Alb  3.8  /  TBili  0.4  /  DBili  x   /  AST  26  /  ALT  9   /  AlkPhos  132<H>  01-15          LIPIDS    RADIOLOGY ENDOCRINE INITIAL CONSULT - t2dm    HPI:  74 y/o male w/ HTN, HLD, T2DM on insulin, ESRD (HD on MWF via R arm AVF. AVF creation surgery in 9/2022, HD since 12/2022)  for a little over a year, h/o poor compliance w meds and frequent admission, now being sent from the dialysis center 2/2 hypertensive urgency and hyperglycemia    Ptn is asymptomatic. FS in the ED close to 500. BP at the dialysis center: 200/80  Denies fevers, chills, nausea, vomiting, dizziness, chest pain, SOB, abdominal pain, dysuria, hematuria.  (15 Deuce 2024 13:47)      ENDOCRINE HISTORY   Diagnosed with DM:   Last HbA1c: 14  Endocrinologist:   Home DM Meds:  Adherence:  Microvascular complications: Renal, opthalmologic, neuropathy  Macrovascular complications:  SMBG:  Symptoms:  Hypoglycemia episodes:  BG at home:  Diet at home:  Appetite in hospital:  Exercise:  PMHx:  PSHx:  Family hx:  Social hx:  Insurance: [employer]  [facility]      PAST MEDICAL & SURGICAL HISTORY:  Diabetes      Benign essential HTN      HLD (hyperlipidemia)      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      ESRD on hemodialysis      Arteriovenous fistula          FAMILY HISTORY:  FHx: diabetes mellitus (Father, Aunt)        Social History:      Home Medications:  atorvastatin 20 mg oral tablet: 1 tab(s) orally once a day (15 Deuce 2024 13:56)  calcium acetate 667 mg oral tablet: 2 tab(s) orally 3 times a day (15 Deuce 2024 15:43)  Coreg 25 mg oral tablet: 1 tab(s) orally 2 times a day (15 Deuce 2024 15:43)  donepezil 10 mg oral tablet: 1 tab(s) orally once a day (at bedtime) (15 Deuce 2024 15:43)      MEDICATIONS  (STANDING):  atorvastatin 20 milliGRAM(s) Oral at bedtime  calcium acetate 1334 milliGRAM(s) Oral two times a day with meals  carvedilol 12.5 milliGRAM(s) Oral every 12 hours  chlorhexidine 4% Liquid 1 Application(s) Topical daily  dextrose 5%. 1000 milliLiter(s) (100 mL/Hr) IV Continuous <Continuous>  dextrose 5%. 1000 milliLiter(s) (50 mL/Hr) IV Continuous <Continuous>  dextrose 50% Injectable 12.5 Gram(s) IV Push once  dextrose 50% Injectable 25 Gram(s) IV Push once  dextrose 50% Injectable 25 Gram(s) IV Push once  glucagon  Injectable 1 milliGRAM(s) IntraMuscular once  heparin   Injectable 5000 Unit(s) SubCutaneous every 12 hours  hydrALAZINE 100 milliGRAM(s) Oral three times a day  insulin lispro (ADMELOG) corrective regimen sliding scale   SubCutaneous at bedtime  insulin lispro (ADMELOG) corrective regimen sliding scale   SubCutaneous three times a day before meals  insulin lispro Injectable (ADMELOG) 5 Unit(s) SubCutaneous three times a day before meals  NIFEdipine XL 60 milliGRAM(s) Oral two times a day    MEDICATIONS  (PRN):  dextrose Oral Gel 15 Gram(s) Oral once PRN Blood Glucose LESS THAN 70 milliGRAM(s)/deciliter  sodium chloride 0.9% Bolus. 100 milliLiter(s) IV Bolus every 5 minutes PRN SBP LESS THAN or EQUAL to 90 mmHg      Allergies    No Known Allergies    Intolerances        REVIEW OF SYSTEMS  Constitutional: No fever  Eyes: No blurry vision  Neuro: No tremors  HEENT: No pain  Cardiovascular: No chest pain, palpitations  Respiratory: No SOB, no cough  GI: No nausea, vomiting, abdominal pain  : No dysuria  Skin: no rash  Psych: no depression  Endocrine: no polyuria, polydipsia  Hem/lymph: no swelling  Osteoporosis: no fractures  ALL OTHER SYSTEMS REVIEWED AND NEGATIVE     UNABLE TO OBTAIN     PHYSICAL EXAM   Vital Signs Last 24 Hrs  T(C): 36.7 (16 Jan 2024 10:08), Max: 37 (15 Deuce 2024 19:36)  T(F): 98 (16 Jan 2024 10:08), Max: 98.6 (15 Deuce 2024 19:36)  HR: 54 (16 Jan 2024 10:08) (44 - 59)  BP: 155/76 (16 Jan 2024 10:08) (135/61 - 196/56)  BP(mean): --  RR: 18 (16 Jan 2024 10:08) (15 - 18)  SpO2: 100% (16 Jan 2024 10:08) (98% - 100%)    Parameters below as of 16 Jan 2024 10:08  Patient On (Oxygen Delivery Method): room air      GENERAL: NAD, well-groomed, well-developed  EYES: No proptosis, no lid lag, anicteric  HEENT:  Atraumatic, Normocephalic, moist mucous membranes  THYROID: Normal size, no palpable nodules  RESPIRATORY: Clear to auscultation bilaterally; No rales, rhonchi, wheezing  CARDIOVASCULAR: Regular rate and rhythm; No murmurs; no peripheral edema  GI: Soft, nontender, non distended, normal bowel sounds  SKIN: Dry, intact, No rashes or lesions  MUSCULOSKELETAL: Full range of motion, normal strength  NEURO: sensation intact, extraocular movements intact, no tremor  PSYCH: Alert and oriented x 3, normal affect, normal mood  CUSHING'S SIGNS: no striae    CAPILLARY BLOOD GLUCOSE      POCT Blood Glucose.: 107 mg/dL (16 Jan 2024 09:15)  POCT Blood Glucose.: 138 mg/dL (16 Jan 2024 07:58)  POCT Blood Glucose.: 137 mg/dL (16 Jan 2024 06:36)  POCT Blood Glucose.: 87 mg/dL (16 Jan 2024 05:47)  POCT Blood Glucose.: 279 mg/dL (15 Deuce 2024 23:26)  POCT Blood Glucose.: 284 mg/dL (15 Deuce 2024 21:39)  POCT Blood Glucose.: 474 mg/dL (15 Deuce 2024 17:31)  POCT Blood Glucose.: 529 mg/dL (15 Deuce 2024 16:30)  POCT Blood Glucose.: 479 mg/dL (15 Deuce 2024 15:21)  POCT Blood Glucose.: 566 mg/dL (15 Deuce 2024 13:37)  POCT Blood Glucose.: 538 mg/dL (15 Deuce 2024 13:36)  POCT Blood Glucose.: 497 mg/dL (15 Deuce 2024 11:40)      A1C with Estimated Average Glucose Result: 14.0 % (01-15-24 @ 13:36)                            10.4   6.61  )-----------( 226      ( 16 Jan 2024 06:22 )             34.6       01-16    135  |  98  |  36<H>  ----------------------------<  139<H>  4.0   |  28  |  6.17<H>    Ca    8.8      16 Jan 2024 06:22  Phos  1.8     01-16  Mg     2.60     01-16    TPro  6.9  /  Alb  3.8  /  TBili  0.4  /  DBili  x   /  AST  26  /  ALT  9   /  AlkPhos  132<H>  01-15          LIPIDS    RADIOLOGY ENDOCRINE INITIAL CONSULT - t2dm    HPI:  72 y/o male w/ HTN, HLD, T2DM on insulin, ESRD (HD on MWF via R arm AVF. AVF creation surgery in 9/2022, HD since 12/2022)  for a little over a year, h/o poor compliance w meds and frequent admission, now being sent from the dialysis center 2/2 hypertensive urgency and hyperglycemia    Ptn is asymptomatic. FS in the ED close to 500. BP at the dialysis center: 200/80  Denies fevers, chills, nausea, vomiting, dizziness, chest pain, SOB, abdominal pain, dysuria, hematuria.  (15 Deuce 2024 13:47)      ENDOCRINE HISTORY   Patient would not answer questions, wanted to rest. Per chart review, has had diabetes at least since 8/22 when HbA1c in the 9 range. A1c 14 here. Is ESRD on HD.       PAST MEDICAL & SURGICAL HISTORY:  Diabetes      Benign essential HTN      HLD (hyperlipidemia)      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      ESRD on hemodialysis      Arteriovenous fistula          FAMILY HISTORY:  FHx: diabetes mellitus (Father, Aunt)        Social History:      Home Medications:  atorvastatin 20 mg oral tablet: 1 tab(s) orally once a day (15 Deuce 2024 13:56)  calcium acetate 667 mg oral tablet: 2 tab(s) orally 3 times a day (15 Deuce 2024 15:43)  Coreg 25 mg oral tablet: 1 tab(s) orally 2 times a day (15 Deuce 2024 15:43)  donepezil 10 mg oral tablet: 1 tab(s) orally once a day (at bedtime) (15 Deuce 2024 15:43)      MEDICATIONS  (STANDING):  atorvastatin 20 milliGRAM(s) Oral at bedtime  calcium acetate 1334 milliGRAM(s) Oral two times a day with meals  carvedilol 12.5 milliGRAM(s) Oral every 12 hours  chlorhexidine 4% Liquid 1 Application(s) Topical daily  dextrose 5%. 1000 milliLiter(s) (100 mL/Hr) IV Continuous <Continuous>  dextrose 5%. 1000 milliLiter(s) (50 mL/Hr) IV Continuous <Continuous>  dextrose 50% Injectable 12.5 Gram(s) IV Push once  dextrose 50% Injectable 25 Gram(s) IV Push once  dextrose 50% Injectable 25 Gram(s) IV Push once  glucagon  Injectable 1 milliGRAM(s) IntraMuscular once  heparin   Injectable 5000 Unit(s) SubCutaneous every 12 hours  hydrALAZINE 100 milliGRAM(s) Oral three times a day  insulin lispro (ADMELOG) corrective regimen sliding scale   SubCutaneous at bedtime  insulin lispro (ADMELOG) corrective regimen sliding scale   SubCutaneous three times a day before meals  insulin lispro Injectable (ADMELOG) 5 Unit(s) SubCutaneous three times a day before meals  NIFEdipine XL 60 milliGRAM(s) Oral two times a day    MEDICATIONS  (PRN):  dextrose Oral Gel 15 Gram(s) Oral once PRN Blood Glucose LESS THAN 70 milliGRAM(s)/deciliter  sodium chloride 0.9% Bolus. 100 milliLiter(s) IV Bolus every 5 minutes PRN SBP LESS THAN or EQUAL to 90 mmHg      Allergies    No Known Allergies    Intolerances        REVIEW OF SYSTEMS  UNABLE TO OBTAIN AS PATIENT RESTING AND WOULD NOT ANSWER QUESTIONS    PHYSICAL EXAM   Vital Signs Last 24 Hrs  T(C): 36.7 (16 Jan 2024 10:08), Max: 37 (15 Deuce 2024 19:36)  T(F): 98 (16 Jan 2024 10:08), Max: 98.6 (15 Deuce 2024 19:36)  HR: 54 (16 Jan 2024 10:08) (44 - 59)  BP: 155/76 (16 Jan 2024 10:08) (135/61 - 196/56)  BP(mean): --  RR: 18 (16 Jan 2024 10:08) (15 - 18)  SpO2: 100% (16 Jan 2024 10:08) (98% - 100%)    Parameters below as of 16 Jan 2024 10:08  Patient On (Oxygen Delivery Method): room air      GENERAL: NAD, well-groomed, well-developed  EYES: No proptosis, no lid lag, anicteric  HEENT:  Atraumatic, Normocephalic, moist mucous membranes  THYROID: Normal size, no palpable nodules  RESPIRATORY: Clear to auscultation bilaterally; No rales, rhonchi, wheezing  CARDIOVASCULAR: Regular rate and rhythm; No murmurs; no peripheral edema  GI: Soft, nontender, non distended, normal bowel sounds  SKIN: Dry, intact, No rashes or lesions  MUSCULOSKELETAL: Full range of motion, normal strength  NEURO: sensation intact, extraocular movements intact, no tremor  PSYCH: Alert and oriented x 3, normal affect, normal mood  CUSHING'S SIGNS: no striae    CAPILLARY BLOOD GLUCOSE      POCT Blood Glucose.: 107 mg/dL (16 Jan 2024 09:15)  POCT Blood Glucose.: 138 mg/dL (16 Jan 2024 07:58)  POCT Blood Glucose.: 137 mg/dL (16 Jan 2024 06:36)  POCT Blood Glucose.: 87 mg/dL (16 Jan 2024 05:47)  POCT Blood Glucose.: 279 mg/dL (15 Deuce 2024 23:26)  POCT Blood Glucose.: 284 mg/dL (15 Deuce 2024 21:39)  POCT Blood Glucose.: 474 mg/dL (15 Deuce 2024 17:31)  POCT Blood Glucose.: 529 mg/dL (15 Deuce 2024 16:30)  POCT Blood Glucose.: 479 mg/dL (15 Deuce 2024 15:21)  POCT Blood Glucose.: 566 mg/dL (15 Deuce 2024 13:37)  POCT Blood Glucose.: 538 mg/dL (15 Deuce 2024 13:36)  POCT Blood Glucose.: 497 mg/dL (15 Deuce 2024 11:40)      A1C with Estimated Average Glucose Result: 14.0 % (01-15-24 @ 13:36)                            10.4   6.61  )-----------( 226      ( 16 Jan 2024 06:22 )             34.6       01-16    135  |  98  |  36<H>  ----------------------------<  139<H>  4.0   |  28  |  6.17<H>    Ca    8.8      16 Jan 2024 06:22  Phos  1.8     01-16  Mg     2.60     01-16    TPro  6.9  /  Alb  3.8  /  TBili  0.4  /  DBili  x   /  AST  26  /  ALT  9   /  AlkPhos  132<H>  01-15          LIPIDS    RADIOLOGY ENDOCRINE INITIAL CONSULT - t2dm    HPI:  74 y/o male w/ HTN, HLD, T2DM on insulin, ESRD (HD on MWF via R arm AVF. AVF creation surgery in 9/2022, HD since 12/2022)  for a little over a year, h/o poor compliance w meds and frequent admission, now being sent from the dialysis center 2/2 hypertensive urgency and hyperglycemia    Ptn is asymptomatic. FS in the ED close to 500. BP at the dialysis center: 200/80  Denies fevers, chills, nausea, vomiting, dizziness, chest pain, SOB, abdominal pain, dysuria, hematuria.  (15 Deuce 2024 13:47)      ENDOCRINE HISTORY   Patient would not answer questions, wanted to rest. Per chart review, has had diabetes at least since 8/22 when HbA1c in the 9 range. A1c 14 here. Is ESRD on HD.       PAST MEDICAL & SURGICAL HISTORY:  Diabetes      Benign essential HTN      HLD (hyperlipidemia)      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      ESRD on hemodialysis      Arteriovenous fistula          FAMILY HISTORY:  FHx: diabetes mellitus (Father, Aunt)        Social History:      Home Medications:  atorvastatin 20 mg oral tablet: 1 tab(s) orally once a day (15 Deuce 2024 13:56)  calcium acetate 667 mg oral tablet: 2 tab(s) orally 3 times a day (15 Deuce 2024 15:43)  Coreg 25 mg oral tablet: 1 tab(s) orally 2 times a day (15 Deuce 2024 15:43)  donepezil 10 mg oral tablet: 1 tab(s) orally once a day (at bedtime) (15 Deuce 2024 15:43)      MEDICATIONS  (STANDING):  atorvastatin 20 milliGRAM(s) Oral at bedtime  calcium acetate 1334 milliGRAM(s) Oral two times a day with meals  carvedilol 12.5 milliGRAM(s) Oral every 12 hours  chlorhexidine 4% Liquid 1 Application(s) Topical daily  dextrose 5%. 1000 milliLiter(s) (100 mL/Hr) IV Continuous <Continuous>  dextrose 5%. 1000 milliLiter(s) (50 mL/Hr) IV Continuous <Continuous>  dextrose 50% Injectable 12.5 Gram(s) IV Push once  dextrose 50% Injectable 25 Gram(s) IV Push once  dextrose 50% Injectable 25 Gram(s) IV Push once  glucagon  Injectable 1 milliGRAM(s) IntraMuscular once  heparin   Injectable 5000 Unit(s) SubCutaneous every 12 hours  hydrALAZINE 100 milliGRAM(s) Oral three times a day  insulin lispro (ADMELOG) corrective regimen sliding scale   SubCutaneous at bedtime  insulin lispro (ADMELOG) corrective regimen sliding scale   SubCutaneous three times a day before meals  insulin lispro Injectable (ADMELOG) 5 Unit(s) SubCutaneous three times a day before meals  NIFEdipine XL 60 milliGRAM(s) Oral two times a day    MEDICATIONS  (PRN):  dextrose Oral Gel 15 Gram(s) Oral once PRN Blood Glucose LESS THAN 70 milliGRAM(s)/deciliter  sodium chloride 0.9% Bolus. 100 milliLiter(s) IV Bolus every 5 minutes PRN SBP LESS THAN or EQUAL to 90 mmHg      Allergies    No Known Allergies    Intolerances        REVIEW OF SYSTEMS  UNABLE TO OBTAIN AS PATIENT RESTING AND WOULD NOT ANSWER QUESTIONS    PHYSICAL EXAM   Vital Signs Last 24 Hrs  T(C): 36.7 (16 Jan 2024 10:08), Max: 37 (15 Deuce 2024 19:36)  T(F): 98 (16 Jan 2024 10:08), Max: 98.6 (15 Deuce 2024 19:36)  HR: 54 (16 Jan 2024 10:08) (44 - 59)  BP: 155/76 (16 Jan 2024 10:08) (135/61 - 196/56)  BP(mean): --  RR: 18 (16 Jan 2024 10:08) (15 - 18)  SpO2: 100% (16 Jan 2024 10:08) (98% - 100%)    Parameters below as of 16 Jan 2024 10:08  Patient On (Oxygen Delivery Method): room air      GENERAL: NAD, well-groomed, well-developed  EYES: No proptosis, no lid lag, anicteric  HEENT:  Atraumatic, Normocephalic, moist mucous membranes  THYROID: Normal size, no palpable nodules  RESPIRATORY: Clear to auscultation bilaterally; No rales, rhonchi, wheezing  CARDIOVASCULAR: Regular rate and rhythm; No murmurs; no peripheral edema  GI: Soft, nontender, non distended, normal bowel sounds  SKIN: Dry, intact, No rashes or lesions  MUSCULOSKELETAL: Full range of motion, normal strength  NEURO: sensation intact, extraocular movements intact, no tremor  PSYCH: Alert and oriented x 3, normal affect, normal mood  CUSHING'S SIGNS: no striae    CAPILLARY BLOOD GLUCOSE      POCT Blood Glucose.: 107 mg/dL (16 Jan 2024 09:15)  POCT Blood Glucose.: 138 mg/dL (16 Jan 2024 07:58)  POCT Blood Glucose.: 137 mg/dL (16 Jan 2024 06:36)  POCT Blood Glucose.: 87 mg/dL (16 Jan 2024 05:47)  POCT Blood Glucose.: 279 mg/dL (15 Deuce 2024 23:26)  POCT Blood Glucose.: 284 mg/dL (15 Deuce 2024 21:39)  POCT Blood Glucose.: 474 mg/dL (15 Deuce 2024 17:31)  POCT Blood Glucose.: 529 mg/dL (15 Deuce 2024 16:30)  POCT Blood Glucose.: 479 mg/dL (15 Deuce 2024 15:21)  POCT Blood Glucose.: 566 mg/dL (15 Deuce 2024 13:37)  POCT Blood Glucose.: 538 mg/dL (15 Deuce 2024 13:36)  POCT Blood Glucose.: 497 mg/dL (15 Deuce 2024 11:40)      A1C with Estimated Average Glucose Result: 14.0 % (01-15-24 @ 13:36)                            10.4   6.61  )-----------( 226      ( 16 Jan 2024 06:22 )             34.6       01-16    135  |  98  |  36<H>  ----------------------------<  139<H>  4.0   |  28  |  6.17<H>    Ca    8.8      16 Jan 2024 06:22  Phos  1.8     01-16  Mg     2.60     01-16    TPro  6.9  /  Alb  3.8  /  TBili  0.4  /  DBili  x   /  AST  26  /  ALT  9   /  AlkPhos  132<H>  01-15          LIPIDS    RADIOLOGY ENDOCRINE INITIAL CONSULT - t2dm    HPI:  74 y/o male w/ HTN, HLD, T2DM on insulin, ESRD (HD on MWF via R arm AVF. AVF creation surgery in 9/2022, HD since 12/2022)  for a little over a year, h/o poor compliance w meds and frequent admission, now being sent from the dialysis center 2/2 hypertensive urgency and hyperglycemia    Ptn is asymptomatic. FS in the ED close to 500. BP at the dialysis center: 200/80  Denies fevers, chills, nausea, vomiting, dizziness, chest pain, SOB, abdominal pain, dysuria, hematuria.  (15 Deuce 2024 13:47)      ENDOCRINE HISTORY   Patient would not answer questions, wanted to rest. Per chart review, has had diabetes at least since 8/22 when HbA1c in the 9 range. A1c 14 here. Is ESRD on HD.       PAST MEDICAL & SURGICAL HISTORY:  Diabetes      Benign essential HTN      HLD (hyperlipidemia)      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      ESRD on hemodialysis      Arteriovenous fistula          FAMILY HISTORY:  FHx: diabetes mellitus (Father, Aunt)        Social History:      Home Medications:  atorvastatin 20 mg oral tablet: 1 tab(s) orally once a day (15 Deuce 2024 13:56)  calcium acetate 667 mg oral tablet: 2 tab(s) orally 3 times a day (15 Deuce 2024 15:43)  Coreg 25 mg oral tablet: 1 tab(s) orally 2 times a day (15 Deuce 2024 15:43)  donepezil 10 mg oral tablet: 1 tab(s) orally once a day (at bedtime) (15 Deuce 2024 15:43)      MEDICATIONS  (STANDING):  atorvastatin 20 milliGRAM(s) Oral at bedtime  calcium acetate 1334 milliGRAM(s) Oral two times a day with meals  carvedilol 12.5 milliGRAM(s) Oral every 12 hours  chlorhexidine 4% Liquid 1 Application(s) Topical daily  dextrose 5%. 1000 milliLiter(s) (100 mL/Hr) IV Continuous <Continuous>  dextrose 5%. 1000 milliLiter(s) (50 mL/Hr) IV Continuous <Continuous>  dextrose 50% Injectable 12.5 Gram(s) IV Push once  dextrose 50% Injectable 25 Gram(s) IV Push once  dextrose 50% Injectable 25 Gram(s) IV Push once  glucagon  Injectable 1 milliGRAM(s) IntraMuscular once  heparin   Injectable 5000 Unit(s) SubCutaneous every 12 hours  hydrALAZINE 100 milliGRAM(s) Oral three times a day  insulin lispro (ADMELOG) corrective regimen sliding scale   SubCutaneous at bedtime  insulin lispro (ADMELOG) corrective regimen sliding scale   SubCutaneous three times a day before meals  insulin lispro Injectable (ADMELOG) 5 Unit(s) SubCutaneous three times a day before meals  NIFEdipine XL 60 milliGRAM(s) Oral two times a day    MEDICATIONS  (PRN):  dextrose Oral Gel 15 Gram(s) Oral once PRN Blood Glucose LESS THAN 70 milliGRAM(s)/deciliter  sodium chloride 0.9% Bolus. 100 milliLiter(s) IV Bolus every 5 minutes PRN SBP LESS THAN or EQUAL to 90 mmHg      Allergies    No Known Allergies    Intolerances        REVIEW OF SYSTEMS  UNABLE TO OBTAIN AS PATIENT RESTING AND WOULD NOT ANSWER QUESTIONS    PHYSICAL EXAM   Vital Signs Last 24 Hrs  T(C): 36.7 (16 Jan 2024 10:08), Max: 37 (15 Deuce 2024 19:36)  T(F): 98 (16 Jan 2024 10:08), Max: 98.6 (15 Deuce 2024 19:36)  HR: 54 (16 Jan 2024 10:08) (44 - 59)  BP: 155/76 (16 Jan 2024 10:08) (135/61 - 196/56)  BP(mean): --  RR: 18 (16 Jan 2024 10:08) (15 - 18)  SpO2: 100% (16 Jan 2024 10:08) (98% - 100%)    Parameters below as of 16 Jan 2024 10:08  Patient On (Oxygen Delivery Method): room air      GENERAL: lethargic, lying in bed  EYES: No proptosis, anicteric  HEENT:  Atraumatic, Normocephalic, dry mucous membranes  THYROID: Normal size, no palpable nodules  RESPIRATORY: Clear to auscultation bilaterally anteriorly; No rales, rhonchi, wheezing  CARDIOVASCULAR: Regular rate and rhythm; No murmurs  GI: Soft, nontender, non distended, normal bowel sounds  SKIN: Dry, intact  MUSCULOSKELETAL: moving extremities spontaneously, no peripheral edema  NEURO: sensation intact, extraocular movements intact, no resting tremor tremor  PSYCH: Resting, occasionally shaking head yes or no to questions  CUSHING'S SIGNS: no striae, no acanthosis    CAPILLARY BLOOD GLUCOSE      POCT Blood Glucose.: 107 mg/dL (16 Jan 2024 09:15)  POCT Blood Glucose.: 138 mg/dL (16 Jan 2024 07:58)  POCT Blood Glucose.: 137 mg/dL (16 Jan 2024 06:36)  POCT Blood Glucose.: 87 mg/dL (16 Jan 2024 05:47)  POCT Blood Glucose.: 279 mg/dL (15 Deuce 2024 23:26)  POCT Blood Glucose.: 284 mg/dL (15 Deuce 2024 21:39)  POCT Blood Glucose.: 474 mg/dL (15 Deuce 2024 17:31)  POCT Blood Glucose.: 529 mg/dL (15 Deuce 2024 16:30)  POCT Blood Glucose.: 479 mg/dL (15 Deuce 2024 15:21)  POCT Blood Glucose.: 566 mg/dL (15 Deuce 2024 13:37)  POCT Blood Glucose.: 538 mg/dL (15 Deuce 2024 13:36)  POCT Blood Glucose.: 497 mg/dL (15 Deuce 2024 11:40)      A1C with Estimated Average Glucose Result: 14.0 % (01-15-24 @ 13:36)                            10.4   6.61  )-----------( 226      ( 16 Jan 2024 06:22 )             34.6       01-16    135  |  98  |  36<H>  ----------------------------<  139<H>  4.0   |  28  |  6.17<H>    Ca    8.8      16 Jan 2024 06:22  Phos  1.8     01-16  Mg     2.60     01-16    TPro  6.9  /  Alb  3.8  /  TBili  0.4  /  DBili  x   /  AST  26  /  ALT  9   /  AlkPhos  132<H>  01-15          LIPIDS    RADIOLOGY ENDOCRINE INITIAL CONSULT - t2dm    HPI:  72 y/o male w/ HTN, HLD, T2DM on insulin, ESRD (HD on MWF via R arm AVF. AVF creation surgery in 9/2022, HD since 12/2022)  for a little over a year, h/o poor compliance w meds and frequent admission, now being sent from the dialysis center 2/2 hypertensive urgency and hyperglycemia    Ptn is asymptomatic. FS in the ED close to 500. BP at the dialysis center: 200/80  Denies fevers, chills, nausea, vomiting, dizziness, chest pain, SOB, abdominal pain, dysuria, hematuria.  (15 Deuce 2024 13:47)      ENDOCRINE HISTORY   Patient would not answer questions, wanted to rest. Per chart review, has had diabetes at least since 8/22 when HbA1c in the 9 range. A1c 14 here. Is ESRD on HD.       PAST MEDICAL & SURGICAL HISTORY:  Diabetes      Benign essential HTN      HLD (hyperlipidemia)      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      ESRD on hemodialysis      Arteriovenous fistula          FAMILY HISTORY:  FHx: diabetes mellitus (Father, Aunt)        Social History:      Home Medications:  atorvastatin 20 mg oral tablet: 1 tab(s) orally once a day (15 Deuce 2024 13:56)  calcium acetate 667 mg oral tablet: 2 tab(s) orally 3 times a day (15 Deuce 2024 15:43)  Coreg 25 mg oral tablet: 1 tab(s) orally 2 times a day (15 Deuce 2024 15:43)  donepezil 10 mg oral tablet: 1 tab(s) orally once a day (at bedtime) (15 Deuce 2024 15:43)      MEDICATIONS  (STANDING):  atorvastatin 20 milliGRAM(s) Oral at bedtime  calcium acetate 1334 milliGRAM(s) Oral two times a day with meals  carvedilol 12.5 milliGRAM(s) Oral every 12 hours  chlorhexidine 4% Liquid 1 Application(s) Topical daily  dextrose 5%. 1000 milliLiter(s) (100 mL/Hr) IV Continuous <Continuous>  dextrose 5%. 1000 milliLiter(s) (50 mL/Hr) IV Continuous <Continuous>  dextrose 50% Injectable 12.5 Gram(s) IV Push once  dextrose 50% Injectable 25 Gram(s) IV Push once  dextrose 50% Injectable 25 Gram(s) IV Push once  glucagon  Injectable 1 milliGRAM(s) IntraMuscular once  heparin   Injectable 5000 Unit(s) SubCutaneous every 12 hours  hydrALAZINE 100 milliGRAM(s) Oral three times a day  insulin lispro (ADMELOG) corrective regimen sliding scale   SubCutaneous at bedtime  insulin lispro (ADMELOG) corrective regimen sliding scale   SubCutaneous three times a day before meals  insulin lispro Injectable (ADMELOG) 5 Unit(s) SubCutaneous three times a day before meals  NIFEdipine XL 60 milliGRAM(s) Oral two times a day    MEDICATIONS  (PRN):  dextrose Oral Gel 15 Gram(s) Oral once PRN Blood Glucose LESS THAN 70 milliGRAM(s)/deciliter  sodium chloride 0.9% Bolus. 100 milliLiter(s) IV Bolus every 5 minutes PRN SBP LESS THAN or EQUAL to 90 mmHg      Allergies    No Known Allergies    Intolerances        REVIEW OF SYSTEMS  UNABLE TO OBTAIN AS PATIENT RESTING AND WOULD NOT ANSWER QUESTIONS    PHYSICAL EXAM   Vital Signs Last 24 Hrs  T(C): 36.7 (16 Jan 2024 10:08), Max: 37 (15 Deuce 2024 19:36)  T(F): 98 (16 Jan 2024 10:08), Max: 98.6 (15 Deuce 2024 19:36)  HR: 54 (16 Jan 2024 10:08) (44 - 59)  BP: 155/76 (16 Jan 2024 10:08) (135/61 - 196/56)  BP(mean): --  RR: 18 (16 Jan 2024 10:08) (15 - 18)  SpO2: 100% (16 Jan 2024 10:08) (98% - 100%)    Parameters below as of 16 Jan 2024 10:08  Patient On (Oxygen Delivery Method): room air      GENERAL: lethargic, lying in bed  EYES: No proptosis, anicteric  HEENT:  Atraumatic, Normocephalic, dry mucous membranes  THYROID: Normal size, no palpable nodules  RESPIRATORY: Clear to auscultation bilaterally anteriorly; No rales, rhonchi, wheezing  CARDIOVASCULAR: Regular rate and rhythm; No murmurs  GI: Soft, nontender, non distended, normal bowel sounds  SKIN: Dry, intact  MUSCULOSKELETAL: moving extremities spontaneously, no peripheral edema  NEURO: sensation intact, extraocular movements intact, no resting tremor tremor  PSYCH: Resting, occasionally shaking head yes or no to questions  CUSHING'S SIGNS: no striae, no acanthosis    CAPILLARY BLOOD GLUCOSE      POCT Blood Glucose.: 107 mg/dL (16 Jan 2024 09:15)  POCT Blood Glucose.: 138 mg/dL (16 Jan 2024 07:58)  POCT Blood Glucose.: 137 mg/dL (16 Jan 2024 06:36)  POCT Blood Glucose.: 87 mg/dL (16 Jan 2024 05:47)  POCT Blood Glucose.: 279 mg/dL (15 Deuce 2024 23:26)  POCT Blood Glucose.: 284 mg/dL (15 Deuce 2024 21:39)  POCT Blood Glucose.: 474 mg/dL (15 Deuce 2024 17:31)  POCT Blood Glucose.: 529 mg/dL (15 Deuce 2024 16:30)  POCT Blood Glucose.: 479 mg/dL (15 Deuce 2024 15:21)  POCT Blood Glucose.: 566 mg/dL (15 Deuce 2024 13:37)  POCT Blood Glucose.: 538 mg/dL (15 Deuce 2024 13:36)  POCT Blood Glucose.: 497 mg/dL (15 Deuce 2024 11:40)      A1C with Estimated Average Glucose Result: 14.0 % (01-15-24 @ 13:36)                            10.4   6.61  )-----------( 226      ( 16 Jan 2024 06:22 )             34.6       01-16    135  |  98  |  36<H>  ----------------------------<  139<H>  4.0   |  28  |  6.17<H>    Ca    8.8      16 Jan 2024 06:22  Phos  1.8     01-16  Mg     2.60     01-16    TPro  6.9  /  Alb  3.8  /  TBili  0.4  /  DBili  x   /  AST  26  /  ALT  9   /  AlkPhos  132<H>  01-15          LIPIDS    RADIOLOGY ENDOCRINE INITIAL CONSULT - t2dm    HPI:  72 y/o male w/ HTN, HLD, T2DM on insulin, ESRD (HD on MWF via R arm AVF. AVF creation surgery in 9/2022, HD since 12/2022)  for a little over a year, h/o poor compliance w meds and frequent admission, now being sent from the dialysis center 2/2 hypertensive urgency and hyperglycemia    Ptn is asymptomatic. FS in the ED close to 500. BP at the dialysis center: 200/80  Denies fevers, chills, nausea, vomiting, dizziness, chest pain, SOB, abdominal pain, dysuria, hematuria.  (15 Deuce 2024 13:47)      ENDOCRINE HISTORY   Patient would not answer questions, wanted to rest. Per chart review, has had diabetes at least since 8/22 when HbA1c in the 9 range. A1c 14 here. Is ESRD on HD. Not on medication per chart review and outpatient med rec performed by pharmacy. Was filled novolog 70/30, last in 4/23.      PAST MEDICAL & SURGICAL HISTORY:  Diabetes      Benign essential HTN      HLD (hyperlipidemia)      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      ESRD on hemodialysis      Arteriovenous fistula          FAMILY HISTORY:  FHx: diabetes mellitus (Father, Aunt)        Social History:      Home Medications:  atorvastatin 20 mg oral tablet: 1 tab(s) orally once a day (15 Deuce 2024 13:56)  calcium acetate 667 mg oral tablet: 2 tab(s) orally 3 times a day (15 Deuce 2024 15:43)  Coreg 25 mg oral tablet: 1 tab(s) orally 2 times a day (15 Deuce 2024 15:43)  donepezil 10 mg oral tablet: 1 tab(s) orally once a day (at bedtime) (15 Deuce 2024 15:43)      MEDICATIONS  (STANDING):  atorvastatin 20 milliGRAM(s) Oral at bedtime  calcium acetate 1334 milliGRAM(s) Oral two times a day with meals  carvedilol 12.5 milliGRAM(s) Oral every 12 hours  chlorhexidine 4% Liquid 1 Application(s) Topical daily  dextrose 5%. 1000 milliLiter(s) (100 mL/Hr) IV Continuous <Continuous>  dextrose 5%. 1000 milliLiter(s) (50 mL/Hr) IV Continuous <Continuous>  dextrose 50% Injectable 12.5 Gram(s) IV Push once  dextrose 50% Injectable 25 Gram(s) IV Push once  dextrose 50% Injectable 25 Gram(s) IV Push once  glucagon  Injectable 1 milliGRAM(s) IntraMuscular once  heparin   Injectable 5000 Unit(s) SubCutaneous every 12 hours  hydrALAZINE 100 milliGRAM(s) Oral three times a day  insulin lispro (ADMELOG) corrective regimen sliding scale   SubCutaneous at bedtime  insulin lispro (ADMELOG) corrective regimen sliding scale   SubCutaneous three times a day before meals  insulin lispro Injectable (ADMELOG) 5 Unit(s) SubCutaneous three times a day before meals  NIFEdipine XL 60 milliGRAM(s) Oral two times a day    MEDICATIONS  (PRN):  dextrose Oral Gel 15 Gram(s) Oral once PRN Blood Glucose LESS THAN 70 milliGRAM(s)/deciliter  sodium chloride 0.9% Bolus. 100 milliLiter(s) IV Bolus every 5 minutes PRN SBP LESS THAN or EQUAL to 90 mmHg      Allergies    No Known Allergies    Intolerances        REVIEW OF SYSTEMS  UNABLE TO OBTAIN AS PATIENT RESTING AND WOULD NOT ANSWER QUESTIONS    PHYSICAL EXAM   Vital Signs Last 24 Hrs  T(C): 36.7 (16 Jan 2024 10:08), Max: 37 (15 Deuce 2024 19:36)  T(F): 98 (16 Jan 2024 10:08), Max: 98.6 (15 Deuec 2024 19:36)  HR: 54 (16 Jan 2024 10:08) (44 - 59)  BP: 155/76 (16 Jan 2024 10:08) (135/61 - 196/56)  BP(mean): --  RR: 18 (16 Jan 2024 10:08) (15 - 18)  SpO2: 100% (16 Jan 2024 10:08) (98% - 100%)    Parameters below as of 16 Jan 2024 10:08  Patient On (Oxygen Delivery Method): room air      GENERAL: lethargic, lying in bed  EYES: No proptosis, anicteric  HEENT:  Atraumatic, Normocephalic, dry mucous membranes  THYROID: Normal size, no palpable nodules  RESPIRATORY: Clear to auscultation bilaterally anteriorly; No rales, rhonchi, wheezing  CARDIOVASCULAR: Regular rate and rhythm; No murmurs  GI: Soft, nontender, non distended, normal bowel sounds  SKIN: Dry, intact  MUSCULOSKELETAL: moving extremities spontaneously, no peripheral edema  NEURO: sensation intact, extraocular movements intact, no resting tremor tremor  PSYCH: Resting, occasionally shaking head yes or no to questions  CUSHING'S SIGNS: no striae, no acanthosis    CAPILLARY BLOOD GLUCOSE      POCT Blood Glucose.: 107 mg/dL (16 Jan 2024 09:15)  POCT Blood Glucose.: 138 mg/dL (16 Jan 2024 07:58)  POCT Blood Glucose.: 137 mg/dL (16 Jan 2024 06:36)  POCT Blood Glucose.: 87 mg/dL (16 Jan 2024 05:47)  POCT Blood Glucose.: 279 mg/dL (15 Deuce 2024 23:26)  POCT Blood Glucose.: 284 mg/dL (15 Deuce 2024 21:39)  POCT Blood Glucose.: 474 mg/dL (15 Deuce 2024 17:31)  POCT Blood Glucose.: 529 mg/dL (15 Deuce 2024 16:30)  POCT Blood Glucose.: 479 mg/dL (15 Deuce 2024 15:21)  POCT Blood Glucose.: 566 mg/dL (15 Deuce 2024 13:37)  POCT Blood Glucose.: 538 mg/dL (15 Deuce 2024 13:36)  POCT Blood Glucose.: 497 mg/dL (15 Deuce 2024 11:40)      A1C with Estimated Average Glucose Result: 14.0 % (01-15-24 @ 13:36)                            10.4   6.61  )-----------( 226      ( 16 Jan 2024 06:22 )             34.6       01-16    135  |  98  |  36<H>  ----------------------------<  139<H>  4.0   |  28  |  6.17<H>    Ca    8.8      16 Jan 2024 06:22  Phos  1.8     01-16  Mg     2.60     01-16    TPro  6.9  /  Alb  3.8  /  TBili  0.4  /  DBili  x   /  AST  26  /  ALT  9   /  AlkPhos  132<H>  01-15          LIPIDS    RADIOLOGY ENDOCRINE INITIAL CONSULT - t2dm    HPI:  74 y/o male w/ HTN, HLD, T2DM on insulin, ESRD (HD on MWF via R arm AVF. AVF creation surgery in 9/2022, HD since 12/2022)  for a little over a year, h/o poor compliance w meds and frequent admission, now being sent from the dialysis center 2/2 hypertensive urgency and hyperglycemia    Ptn is asymptomatic. FS in the ED close to 500. BP at the dialysis center: 200/80  Denies fevers, chills, nausea, vomiting, dizziness, chest pain, SOB, abdominal pain, dysuria, hematuria.  (15 Deuce 2024 13:47)      ENDOCRINE HISTORY   Patient would not answer questions, wanted to rest. Per chart review, has had diabetes at least since 8/22 when HbA1c in the 9 range. A1c 14 here. Is ESRD on HD. Not on medication per chart review and outpatient med rec performed by pharmacy. Was filled novolog 70/30, last in 4/23.      PAST MEDICAL & SURGICAL HISTORY:  Diabetes      Benign essential HTN      HLD (hyperlipidemia)      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      ESRD on hemodialysis      Arteriovenous fistula          FAMILY HISTORY:  FHx: diabetes mellitus (Father, Aunt)        Social History:      Home Medications:  atorvastatin 20 mg oral tablet: 1 tab(s) orally once a day (15 Deuce 2024 13:56)  calcium acetate 667 mg oral tablet: 2 tab(s) orally 3 times a day (15 Deuce 2024 15:43)  Coreg 25 mg oral tablet: 1 tab(s) orally 2 times a day (15 Deuce 2024 15:43)  donepezil 10 mg oral tablet: 1 tab(s) orally once a day (at bedtime) (15 Deuce 2024 15:43)      MEDICATIONS  (STANDING):  atorvastatin 20 milliGRAM(s) Oral at bedtime  calcium acetate 1334 milliGRAM(s) Oral two times a day with meals  carvedilol 12.5 milliGRAM(s) Oral every 12 hours  chlorhexidine 4% Liquid 1 Application(s) Topical daily  dextrose 5%. 1000 milliLiter(s) (100 mL/Hr) IV Continuous <Continuous>  dextrose 5%. 1000 milliLiter(s) (50 mL/Hr) IV Continuous <Continuous>  dextrose 50% Injectable 12.5 Gram(s) IV Push once  dextrose 50% Injectable 25 Gram(s) IV Push once  dextrose 50% Injectable 25 Gram(s) IV Push once  glucagon  Injectable 1 milliGRAM(s) IntraMuscular once  heparin   Injectable 5000 Unit(s) SubCutaneous every 12 hours  hydrALAZINE 100 milliGRAM(s) Oral three times a day  insulin lispro (ADMELOG) corrective regimen sliding scale   SubCutaneous at bedtime  insulin lispro (ADMELOG) corrective regimen sliding scale   SubCutaneous three times a day before meals  insulin lispro Injectable (ADMELOG) 5 Unit(s) SubCutaneous three times a day before meals  NIFEdipine XL 60 milliGRAM(s) Oral two times a day    MEDICATIONS  (PRN):  dextrose Oral Gel 15 Gram(s) Oral once PRN Blood Glucose LESS THAN 70 milliGRAM(s)/deciliter  sodium chloride 0.9% Bolus. 100 milliLiter(s) IV Bolus every 5 minutes PRN SBP LESS THAN or EQUAL to 90 mmHg      Allergies    No Known Allergies    Intolerances        REVIEW OF SYSTEMS  UNABLE TO OBTAIN AS PATIENT RESTING AND WOULD NOT ANSWER QUESTIONS    PHYSICAL EXAM   Vital Signs Last 24 Hrs  T(C): 36.7 (16 Jan 2024 10:08), Max: 37 (15 Deuce 2024 19:36)  T(F): 98 (16 Jan 2024 10:08), Max: 98.6 (15 Deuce 2024 19:36)  HR: 54 (16 Jan 2024 10:08) (44 - 59)  BP: 155/76 (16 Jan 2024 10:08) (135/61 - 196/56)  BP(mean): --  RR: 18 (16 Jan 2024 10:08) (15 - 18)  SpO2: 100% (16 Jan 2024 10:08) (98% - 100%)    Parameters below as of 16 Jan 2024 10:08  Patient On (Oxygen Delivery Method): room air      GENERAL: lethargic, lying in bed  EYES: No proptosis, anicteric  HEENT:  Atraumatic, Normocephalic, dry mucous membranes  THYROID: Normal size, no palpable nodules  RESPIRATORY: Clear to auscultation bilaterally anteriorly; No rales, rhonchi, wheezing  CARDIOVASCULAR: Regular rate and rhythm; No murmurs  GI: Soft, nontender, non distended, normal bowel sounds  SKIN: Dry, intact  MUSCULOSKELETAL: moving extremities spontaneously, no peripheral edema  NEURO: sensation intact, extraocular movements intact, no resting tremor tremor  PSYCH: Resting, occasionally shaking head yes or no to questions  CUSHING'S SIGNS: no striae, no acanthosis    CAPILLARY BLOOD GLUCOSE      POCT Blood Glucose.: 107 mg/dL (16 Jan 2024 09:15)  POCT Blood Glucose.: 138 mg/dL (16 Jan 2024 07:58)  POCT Blood Glucose.: 137 mg/dL (16 Jan 2024 06:36)  POCT Blood Glucose.: 87 mg/dL (16 Jan 2024 05:47)  POCT Blood Glucose.: 279 mg/dL (15 Deuce 2024 23:26)  POCT Blood Glucose.: 284 mg/dL (15 Deuce 2024 21:39)  POCT Blood Glucose.: 474 mg/dL (15 Deuce 2024 17:31)  POCT Blood Glucose.: 529 mg/dL (15 Deuce 2024 16:30)  POCT Blood Glucose.: 479 mg/dL (15 Deuce 2024 15:21)  POCT Blood Glucose.: 566 mg/dL (15 Deuce 2024 13:37)  POCT Blood Glucose.: 538 mg/dL (15 Deuce 2024 13:36)  POCT Blood Glucose.: 497 mg/dL (15 Deuce 2024 11:40)      A1C with Estimated Average Glucose Result: 14.0 % (01-15-24 @ 13:36)                            10.4   6.61  )-----------( 226      ( 16 Jan 2024 06:22 )             34.6       01-16    135  |  98  |  36<H>  ----------------------------<  139<H>  4.0   |  28  |  6.17<H>    Ca    8.8      16 Jan 2024 06:22  Phos  1.8     01-16  Mg     2.60     01-16    TPro  6.9  /  Alb  3.8  /  TBili  0.4  /  DBili  x   /  AST  26  /  ALT  9   /  AlkPhos  132<H>  01-15          LIPIDS    RADIOLOGY ENDOCRINE INITIAL CONSULT - t2dm    HPI:  72 y/o male w/ HTN, HLD, T2DM on insulin, ESRD (HD on MWF via R arm AVF. AVF creation surgery in 9/2022, HD since 12/2022)  for a little over a year, h/o poor compliance w meds and frequent admission, now being sent from the dialysis center 2/2 hypertensive urgency and hyperglycemia    Ptn is asymptomatic. FS in the ED close to 500. BP at the dialysis center: 200/80  Denies fevers, chills, nausea, vomiting, dizziness, chest pain, SOB, abdominal pain, dysuria, hematuria.  (15 Deuce 2024 13:47)      ENDOCRINE HISTORY   Patient would not answer questions, wanted to rest. Per chart review, has had diabetes at least since 8/22 when HbA1c in the 9 range. A1c 14 here. Is ESRD on HD. Not on medication per chart review and outpatient med rec performed by pharmacy.  Per chart review, patient saw Dr. Tony 9/22 and changed from novolin N 8/7 to tresiba 14 units daily and lyumjev 3-11 units sliding scale with meals, had novolog 70/30 filled 4/23.      PAST MEDICAL & SURGICAL HISTORY:  Diabetes      Benign essential HTN      HLD (hyperlipidemia)      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      ESRD on hemodialysis      Arteriovenous fistula          FAMILY HISTORY:  FHx: diabetes mellitus (Father, Aunt)        Social History:      Home Medications:  atorvastatin 20 mg oral tablet: 1 tab(s) orally once a day (15 Deuce 2024 13:56)  calcium acetate 667 mg oral tablet: 2 tab(s) orally 3 times a day (15 Deuce 2024 15:43)  Coreg 25 mg oral tablet: 1 tab(s) orally 2 times a day (15 Deuce 2024 15:43)  donepezil 10 mg oral tablet: 1 tab(s) orally once a day (at bedtime) (15 Deuce 2024 15:43)      MEDICATIONS  (STANDING):  atorvastatin 20 milliGRAM(s) Oral at bedtime  calcium acetate 1334 milliGRAM(s) Oral two times a day with meals  carvedilol 12.5 milliGRAM(s) Oral every 12 hours  chlorhexidine 4% Liquid 1 Application(s) Topical daily  dextrose 5%. 1000 milliLiter(s) (100 mL/Hr) IV Continuous <Continuous>  dextrose 5%. 1000 milliLiter(s) (50 mL/Hr) IV Continuous <Continuous>  dextrose 50% Injectable 12.5 Gram(s) IV Push once  dextrose 50% Injectable 25 Gram(s) IV Push once  dextrose 50% Injectable 25 Gram(s) IV Push once  glucagon  Injectable 1 milliGRAM(s) IntraMuscular once  heparin   Injectable 5000 Unit(s) SubCutaneous every 12 hours  hydrALAZINE 100 milliGRAM(s) Oral three times a day  insulin lispro (ADMELOG) corrective regimen sliding scale   SubCutaneous at bedtime  insulin lispro (ADMELOG) corrective regimen sliding scale   SubCutaneous three times a day before meals  insulin lispro Injectable (ADMELOG) 5 Unit(s) SubCutaneous three times a day before meals  NIFEdipine XL 60 milliGRAM(s) Oral two times a day    MEDICATIONS  (PRN):  dextrose Oral Gel 15 Gram(s) Oral once PRN Blood Glucose LESS THAN 70 milliGRAM(s)/deciliter  sodium chloride 0.9% Bolus. 100 milliLiter(s) IV Bolus every 5 minutes PRN SBP LESS THAN or EQUAL to 90 mmHg      Allergies    No Known Allergies    Intolerances        REVIEW OF SYSTEMS  UNABLE TO OBTAIN AS PATIENT RESTING AND WOULD NOT ANSWER QUESTIONS    PHYSICAL EXAM   Vital Signs Last 24 Hrs  T(C): 36.7 (16 Jan 2024 10:08), Max: 37 (15 Deuce 2024 19:36)  T(F): 98 (16 Jan 2024 10:08), Max: 98.6 (15 Deuce 2024 19:36)  HR: 54 (16 Jan 2024 10:08) (44 - 59)  BP: 155/76 (16 Jan 2024 10:08) (135/61 - 196/56)  BP(mean): --  RR: 18 (16 Jan 2024 10:08) (15 - 18)  SpO2: 100% (16 Jan 2024 10:08) (98% - 100%)    Parameters below as of 16 Jan 2024 10:08  Patient On (Oxygen Delivery Method): room air      GENERAL: lethargic, lying in bed  EYES: No proptosis, anicteric  HEENT:  Atraumatic, Normocephalic, dry mucous membranes  THYROID: Normal size, no palpable nodules  RESPIRATORY: Clear to auscultation bilaterally anteriorly; No rales, rhonchi, wheezing  CARDIOVASCULAR: Regular rate and rhythm; No murmurs  GI: Soft, nontender, non distended, normal bowel sounds  SKIN: Dry, intact  MUSCULOSKELETAL: moving extremities spontaneously, no peripheral edema  NEURO: sensation intact, extraocular movements intact, no resting tremor tremor  PSYCH: Resting, occasionally shaking head yes or no to questions  CUSHING'S SIGNS: no striae, no acanthosis    CAPILLARY BLOOD GLUCOSE      POCT Blood Glucose.: 107 mg/dL (16 Jan 2024 09:15)  POCT Blood Glucose.: 138 mg/dL (16 Jan 2024 07:58)  POCT Blood Glucose.: 137 mg/dL (16 Jan 2024 06:36)  POCT Blood Glucose.: 87 mg/dL (16 Jan 2024 05:47)  POCT Blood Glucose.: 279 mg/dL (15 Deuce 2024 23:26)  POCT Blood Glucose.: 284 mg/dL (15 Deuce 2024 21:39)  POCT Blood Glucose.: 474 mg/dL (15 Deuce 2024 17:31)  POCT Blood Glucose.: 529 mg/dL (15 Deuce 2024 16:30)  POCT Blood Glucose.: 479 mg/dL (15 Deuce 2024 15:21)  POCT Blood Glucose.: 566 mg/dL (15 Deuce 2024 13:37)  POCT Blood Glucose.: 538 mg/dL (15 Deuce 2024 13:36)  POCT Blood Glucose.: 497 mg/dL (15 Deuce 2024 11:40)      A1C with Estimated Average Glucose Result: 14.0 % (01-15-24 @ 13:36)                            10.4   6.61  )-----------( 226      ( 16 Jan 2024 06:22 )             34.6       01-16    135  |  98  |  36<H>  ----------------------------<  139<H>  4.0   |  28  |  6.17<H>    Ca    8.8      16 Jan 2024 06:22  Phos  1.8     01-16  Mg     2.60     01-16    TPro  6.9  /  Alb  3.8  /  TBili  0.4  /  DBili  x   /  AST  26  /  ALT  9   /  AlkPhos  132<H>  01-15          LIPIDS    RADIOLOGY ENDOCRINE INITIAL CONSULT - t2dm    HPI:  74 y/o male w/ HTN, HLD, T2DM on insulin, ESRD (HD on MWF via R arm AVF. AVF creation surgery in 9/2022, HD since 12/2022)  for a little over a year, h/o poor compliance w meds and frequent admission, now being sent from the dialysis center 2/2 hypertensive urgency and hyperglycemia    Ptn is asymptomatic. FS in the ED close to 500. BP at the dialysis center: 200/80  Denies fevers, chills, nausea, vomiting, dizziness, chest pain, SOB, abdominal pain, dysuria, hematuria.  (15 Deuce 2024 13:47)      ENDOCRINE HISTORY   Patient would not answer questions, wanted to rest. Per chart review, has had diabetes at least since 8/22 when HbA1c in the 9 range. A1c 14 here. Is ESRD on HD. Not on medication per chart review and outpatient med rec performed by pharmacy.  Per chart review, patient saw Dr. Tony 9/22 and changed from novolin N 8/7 to tresiba 14 units daily and lyumjev 3-11 units sliding scale with meals, had novolog 70/30 filled 4/23.      PAST MEDICAL & SURGICAL HISTORY:  Diabetes      Benign essential HTN      HLD (hyperlipidemia)      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      ESRD on hemodialysis      Arteriovenous fistula          FAMILY HISTORY:  FHx: diabetes mellitus (Father, Aunt)        Social History:      Home Medications:  atorvastatin 20 mg oral tablet: 1 tab(s) orally once a day (15 Deuce 2024 13:56)  calcium acetate 667 mg oral tablet: 2 tab(s) orally 3 times a day (15 Deuce 2024 15:43)  Coreg 25 mg oral tablet: 1 tab(s) orally 2 times a day (15 Deuce 2024 15:43)  donepezil 10 mg oral tablet: 1 tab(s) orally once a day (at bedtime) (15 Deuce 2024 15:43)      MEDICATIONS  (STANDING):  atorvastatin 20 milliGRAM(s) Oral at bedtime  calcium acetate 1334 milliGRAM(s) Oral two times a day with meals  carvedilol 12.5 milliGRAM(s) Oral every 12 hours  chlorhexidine 4% Liquid 1 Application(s) Topical daily  dextrose 5%. 1000 milliLiter(s) (100 mL/Hr) IV Continuous <Continuous>  dextrose 5%. 1000 milliLiter(s) (50 mL/Hr) IV Continuous <Continuous>  dextrose 50% Injectable 12.5 Gram(s) IV Push once  dextrose 50% Injectable 25 Gram(s) IV Push once  dextrose 50% Injectable 25 Gram(s) IV Push once  glucagon  Injectable 1 milliGRAM(s) IntraMuscular once  heparin   Injectable 5000 Unit(s) SubCutaneous every 12 hours  hydrALAZINE 100 milliGRAM(s) Oral three times a day  insulin lispro (ADMELOG) corrective regimen sliding scale   SubCutaneous at bedtime  insulin lispro (ADMELOG) corrective regimen sliding scale   SubCutaneous three times a day before meals  insulin lispro Injectable (ADMELOG) 5 Unit(s) SubCutaneous three times a day before meals  NIFEdipine XL 60 milliGRAM(s) Oral two times a day    MEDICATIONS  (PRN):  dextrose Oral Gel 15 Gram(s) Oral once PRN Blood Glucose LESS THAN 70 milliGRAM(s)/deciliter  sodium chloride 0.9% Bolus. 100 milliLiter(s) IV Bolus every 5 minutes PRN SBP LESS THAN or EQUAL to 90 mmHg      Allergies    No Known Allergies    Intolerances        REVIEW OF SYSTEMS  UNABLE TO OBTAIN AS PATIENT RESTING AND WOULD NOT ANSWER QUESTIONS    PHYSICAL EXAM   Vital Signs Last 24 Hrs  T(C): 36.7 (16 Jan 2024 10:08), Max: 37 (15 Deuce 2024 19:36)  T(F): 98 (16 Jan 2024 10:08), Max: 98.6 (15 Deuce 2024 19:36)  HR: 54 (16 Jan 2024 10:08) (44 - 59)  BP: 155/76 (16 Jan 2024 10:08) (135/61 - 196/56)  BP(mean): --  RR: 18 (16 Jan 2024 10:08) (15 - 18)  SpO2: 100% (16 Jan 2024 10:08) (98% - 100%)    Parameters below as of 16 Jan 2024 10:08  Patient On (Oxygen Delivery Method): room air      GENERAL: lethargic, lying in bed  EYES: No proptosis, anicteric  HEENT:  Atraumatic, Normocephalic, dry mucous membranes  THYROID: Normal size, no palpable nodules  RESPIRATORY: Clear to auscultation bilaterally anteriorly; No rales, rhonchi, wheezing  CARDIOVASCULAR: Regular rate and rhythm; No murmurs  GI: Soft, nontender, non distended, normal bowel sounds  SKIN: Dry, intact  MUSCULOSKELETAL: moving extremities spontaneously, no peripheral edema  NEURO: sensation intact, extraocular movements intact, no resting tremor tremor  PSYCH: Resting, occasionally shaking head yes or no to questions  CUSHING'S SIGNS: no striae, no acanthosis    CAPILLARY BLOOD GLUCOSE      POCT Blood Glucose.: 107 mg/dL (16 Jan 2024 09:15)  POCT Blood Glucose.: 138 mg/dL (16 Jan 2024 07:58)  POCT Blood Glucose.: 137 mg/dL (16 Jan 2024 06:36)  POCT Blood Glucose.: 87 mg/dL (16 Jan 2024 05:47)  POCT Blood Glucose.: 279 mg/dL (15 Deuce 2024 23:26)  POCT Blood Glucose.: 284 mg/dL (15 Deuce 2024 21:39)  POCT Blood Glucose.: 474 mg/dL (15 Deuce 2024 17:31)  POCT Blood Glucose.: 529 mg/dL (15 Deuce 2024 16:30)  POCT Blood Glucose.: 479 mg/dL (15 Deuce 2024 15:21)  POCT Blood Glucose.: 566 mg/dL (15 Deuce 2024 13:37)  POCT Blood Glucose.: 538 mg/dL (15 Deuce 2024 13:36)  POCT Blood Glucose.: 497 mg/dL (15 Deuce 2024 11:40)      A1C with Estimated Average Glucose Result: 14.0 % (01-15-24 @ 13:36)                            10.4   6.61  )-----------( 226      ( 16 Jan 2024 06:22 )             34.6       01-16    135  |  98  |  36<H>  ----------------------------<  139<H>  4.0   |  28  |  6.17<H>    Ca    8.8      16 Jan 2024 06:22  Phos  1.8     01-16  Mg     2.60     01-16    TPro  6.9  /  Alb  3.8  /  TBili  0.4  /  DBili  x   /  AST  26  /  ALT  9   /  AlkPhos  132<H>  01-15          LIPIDS    RADIOLOGY

## 2024-01-16 NOTE — PROVIDER CONTACT NOTE (HYPOGLYCEMIA EVENT) - NS PROVIDER CONTACT BACKGROUND-HYPO
Age: 73y    Gender: Male    POCT Blood Glucose:  157 mg/dL (01-16-24 @ 22:14)  205 mg/dL (01-16-24 @ 21:59)  39 mg/dL (01-16-24 @ 21:40)  175 mg/dL (01-16-24 @ 17:08)  147 mg/dL (01-16-24 @ 15:17)  125 mg/dL (01-16-24 @ 12:46)  107 mg/dL (01-16-24 @ 09:15)  138 mg/dL (01-16-24 @ 07:58)      eMAR:atorvastatin   20 milliGRAM(s) Oral (01-15-24 @ 23:31)    dextrose 50% Injectable   25 Gram(s) IV Push (01-16-24 @ 21:49)    insulin lispro (ADMELOG) corrective regimen sliding scale   1 Unit(s) SubCutaneous (01-15-24 @ 23:34)    insulin lispro (ADMELOG) corrective regimen sliding scale   1 Unit(s) SubCutaneous (01-16-24 @ 17:34)    insulin lispro Injectable (ADMELOG)   5 Unit(s) SubCutaneous (01-16-24 @ 17:34)   5 Unit(s) SubCutaneous (01-16-24 @ 15:24)   5 Unit(s) SubCutaneous (01-16-24 @ 09:58)    insulin NPH human recombinant   15 Unit(s) SubCutaneous (01-16-24 @ 09:59)

## 2024-01-16 NOTE — PROVIDER CONTACT NOTE (OTHER) - ASSESSMENT
HR 40 /57 Temp 94.8 Axillary; Patient FS 39; hypoglycemia protocol initiated. Patient is very lethargic but arouses to voice.
patient blood glucose 467 after ademelog given  patient admitted for hyperglycemia and dialysis. a&ox4 denies  headache, dizziness   nad noted.   humulin given
pt a+ox4, denies dizziness and shortness of breath. no signs of acute distress noted.

## 2024-01-16 NOTE — CONSULT NOTE ADULT - SUBJECTIVE AND OBJECTIVE BOX
Cardiovascular Disease Initial Evaluation  Date of Service: 01-16-24 @ 08:47    CHIEF COMPLAINT:    HISTORY OF PRESENT ILLNESS:    This is a 73 year old man with HTN, HLD, T2DM on insulin, and ESRD on HD who presented to [facility] on 1/15/2024 from the dialysis center 2/2 hypertensive urgency and hyperglycemia    The patient is currently undergoing HD in no distress.   He denies fevers, chills, nausea, vomiting, dizziness, chest pain, SOB, abdominal pain, dysuria, hematuria.       Allergies  No Known Allergies        MEDICATIONS:  carvedilol 12.5 milliGRAM(s) Oral every 12 hours  heparin   Injectable 5000 Unit(s) SubCutaneous every 12 hours  hydrALAZINE 100 milliGRAM(s) Oral three times a day  NIFEdipine XL 60 milliGRAM(s) Oral two times a day            atorvastatin 20 milliGRAM(s) Oral at bedtime  dextrose 50% Injectable 12.5 Gram(s) IV Push once  dextrose 50% Injectable 25 Gram(s) IV Push once  dextrose 50% Injectable 25 Gram(s) IV Push once  dextrose Oral Gel 15 Gram(s) Oral once PRN  glucagon  Injectable 1 milliGRAM(s) IntraMuscular once  insulin lispro (ADMELOG) corrective regimen sliding scale   SubCutaneous at bedtime  insulin lispro (ADMELOG) corrective regimen sliding scale   SubCutaneous three times a day before meals  insulin lispro Injectable (ADMELOG) 5 Unit(s) SubCutaneous three times a day before meals  insulin NPH human recombinant 15 Unit(s) SubCutaneous once    calcium acetate 1334 milliGRAM(s) Oral two times a day with meals  chlorhexidine 4% Liquid 1 Application(s) Topical daily  dextrose 5%. 1000 milliLiter(s) IV Continuous <Continuous>  dextrose 5%. 1000 milliLiter(s) IV Continuous <Continuous>  sodium chloride 0.9% Bolus. 100 milliLiter(s) IV Bolus every 5 minutes PRN      PAST MEDICAL & SURGICAL HISTORY:  Diabetes      Benign essential HTN      HLD (hyperlipidemia)      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      ESRD on hemodialysis      Arteriovenous fistula          FAMILY HISTORY:  FHx: diabetes mellitus (Father, Aunt)        SOCIAL HISTORY:    The patient is a nonsmoker       REVIEW OF SYSTEMS:  See HPI, otherwise complete 14 point review of systems negative        PHYSICAL EXAM:  T(C): 36.6 (01-16-24 @ 06:25), Max: 37 (01-15-24 @ 19:36)  HR: 49 (01-16-24 @ 06:25) (44 - 55)  BP: 147/- (01-16-24 @ 06:25) (135/61 - 219/81)  RR: 18 (01-16-24 @ 06:25) (15 - 18)  SpO2: 100% (01-16-24 @ 06:25) (98% - 100%)  Wt(kg): --  I&O's Summary      Appearance: No Acute Distress; resting comfortably  HEENT:  Normal oral mucosa, PERRL, EOMI	  Cardiovascular: Normal S1 S2, No JVD, No murmurs/rubs/gallops  Respiratory: Normal respiratory effort; Lungs clear to auscultation bilaterally  Gastrointestinal:  Soft, Non-tender, + BS	  Skin: No rashes, No ecchymoses, No cyanosis	  Neurologic: Non-focal; no weakness  Extremities: No clubbing, cyanosis or edema  Vascular: Peripheral pulses palpable 2+ bilaterally  Psychiatry: A & O x 3, Mood & affect appropriate    Laboratory Data:	 	    CBC Full  -  ( 16 Jan 2024 06:22 )  WBC Count : 6.61 K/uL  Hemoglobin : 10.4 g/dL  Hematocrit : 34.6 %  Platelet Count - Automated : 226 K/uL  Mean Cell Volume : 61.9 fL  Mean Cell Hemoglobin : 18.6 pg  Mean Cell Hemoglobin Concentration : 30.1 gm/dL  Auto Neutrophil # : x  Auto Lymphocyte # : x  Auto Monocyte # : x  Auto Eosinophil # : x  Auto Basophil # : x  Auto Neutrophil % : x  Auto Lymphocyte % : x  Auto Monocyte % : x  Auto Eosinophil % : x  Auto Basophil % : x    01-16    135  |  98  |  36<H>  ----------------------------<  139<H>  4.0   |  28  |  6.17<H>  01-15    131<L>  |  91<L>  |  35<H>  ----------------------------<  581<HH>  4.9   |  26  |  6.08<H>    Ca    8.8      16 Jan 2024 06:22  Ca    9.4      15 Deuce 2024 13:36  Phos  1.8     01-16  Phos  2.8     01-15  Mg     2.60     01-16  Mg     2.80     01-15    TPro  6.9  /  Alb  3.8  /  TBili  0.4  /  DBili  x   /  AST  26  /  ALT  9   /  AlkPhos  132<H>  01-15        Interpretation of Telemetry: Sinus	    ECG:  	Sinus; LVH    Assessment: 73 year old man with HTN, HLD, T2DM on insulin, and ESRD on HD presents with supply demand ischemia and HTN urgency.     Plan of Care:    #Supply demand ischemia-  Secondary to HTN urgency.  Mr. Art is currently undergoing HD and denies chest pain.  Admission EKG is sinus with LVH and no ischemic changes.  Obtain echocardiogram.     #Sinus bradycardia-  No evidence of AV block on admission EKG.  Observe on telemetry.    #HTN urgency-  BP now improving with HD.    #ESRD-  HD as per renal.       Complex medical decision making.         76 minutes spent on total encounter; more than 50% of the visit was spent counseling and/or coordinating care by the attending physician.   	  Geovani Bravo MD [facility]  Cardiovascular Diseases  [phone]     Cardiovascular Disease Initial Evaluation  Date of Service: 01-16-24 @ 08:47    CHIEF COMPLAINT:    HISTORY OF PRESENT ILLNESS:    This is a 73 year old man with HTN, HLD, T2DM on insulin, and ESRD on HD who presented to [facility] on 1/15/2024 from the dialysis center 2/2 hypertensive urgency and hyperglycemia    The patient is currently undergoing HD in no distress.   He denies fevers, chills, nausea, vomiting, dizziness, chest pain, SOB, abdominal pain, dysuria, hematuria.       Allergies  No Known Allergies        MEDICATIONS:  carvedilol 12.5 milliGRAM(s) Oral every 12 hours  heparin   Injectable 5000 Unit(s) SubCutaneous every 12 hours  hydrALAZINE 100 milliGRAM(s) Oral three times a day  NIFEdipine XL 60 milliGRAM(s) Oral two times a day            atorvastatin 20 milliGRAM(s) Oral at bedtime  dextrose 50% Injectable 12.5 Gram(s) IV Push once  dextrose 50% Injectable 25 Gram(s) IV Push once  dextrose 50% Injectable 25 Gram(s) IV Push once  dextrose Oral Gel 15 Gram(s) Oral once PRN  glucagon  Injectable 1 milliGRAM(s) IntraMuscular once  insulin lispro (ADMELOG) corrective regimen sliding scale   SubCutaneous at bedtime  insulin lispro (ADMELOG) corrective regimen sliding scale   SubCutaneous three times a day before meals  insulin lispro Injectable (ADMELOG) 5 Unit(s) SubCutaneous three times a day before meals  insulin NPH human recombinant 15 Unit(s) SubCutaneous once    calcium acetate 1334 milliGRAM(s) Oral two times a day with meals  chlorhexidine 4% Liquid 1 Application(s) Topical daily  dextrose 5%. 1000 milliLiter(s) IV Continuous <Continuous>  dextrose 5%. 1000 milliLiter(s) IV Continuous <Continuous>  sodium chloride 0.9% Bolus. 100 milliLiter(s) IV Bolus every 5 minutes PRN      PAST MEDICAL & SURGICAL HISTORY:  Diabetes      Benign essential HTN      HLD (hyperlipidemia)      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      ESRD on hemodialysis      Arteriovenous fistula          FAMILY HISTORY:  FHx: diabetes mellitus (Father, Aunt)        SOCIAL HISTORY:    The patient is a nonsmoker       REVIEW OF SYSTEMS:  See HPI, otherwise complete 14 point review of systems negative        PHYSICAL EXAM:  T(C): 36.6 (01-16-24 @ 06:25), Max: 37 (01-15-24 @ 19:36)  HR: 49 (01-16-24 @ 06:25) (44 - 55)  BP: 147/- (01-16-24 @ 06:25) (135/61 - 219/81)  RR: 18 (01-16-24 @ 06:25) (15 - 18)  SpO2: 100% (01-16-24 @ 06:25) (98% - 100%)  Wt(kg): --  I&O's Summary      Appearance: No Acute Distress; resting comfortably  HEENT:  Normal oral mucosa, PERRL, EOMI	  Cardiovascular: Normal S1 S2, No JVD, No murmurs/rubs/gallops  Respiratory: Normal respiratory effort; Lungs clear to auscultation bilaterally  Gastrointestinal:  Soft, Non-tender, + BS	  Skin: No rashes, No ecchymoses, No cyanosis	  Neurologic: Non-focal; no weakness  Extremities: No clubbing, cyanosis or edema  Vascular: Peripheral pulses palpable 2+ bilaterally  Psychiatry: A & O x 3, Mood & affect appropriate    Laboratory Data:	 	    CBC Full  -  ( 16 Jan 2024 06:22 )  WBC Count : 6.61 K/uL  Hemoglobin : 10.4 g/dL  Hematocrit : 34.6 %  Platelet Count - Automated : 226 K/uL  Mean Cell Volume : 61.9 fL  Mean Cell Hemoglobin : 18.6 pg  Mean Cell Hemoglobin Concentration : 30.1 gm/dL  Auto Neutrophil # : x  Auto Lymphocyte # : x  Auto Monocyte # : x  Auto Eosinophil # : x  Auto Basophil # : x  Auto Neutrophil % : x  Auto Lymphocyte % : x  Auto Monocyte % : x  Auto Eosinophil % : x  Auto Basophil % : x    01-16    135  |  98  |  36<H>  ----------------------------<  139<H>  4.0   |  28  |  6.17<H>  01-15    131<L>  |  91<L>  |  35<H>  ----------------------------<  581<HH>  4.9   |  26  |  6.08<H>    Ca    8.8      16 Jan 2024 06:22  Ca    9.4      15 Deuce 2024 13:36  Phos  1.8     01-16  Phos  2.8     01-15  Mg     2.60     01-16  Mg     2.80     01-15    TPro  6.9  /  Alb  3.8  /  TBili  0.4  /  DBili  x   /  AST  26  /  ALT  9   /  AlkPhos  132<H>  01-15        Interpretation of Telemetry: Sinus	    ECG:  	Sinus; LVH    Assessment: 73 year old man with HTN, HLD, T2DM on insulin, and ESRD on HD presents with supply demand ischemia and HTN urgency.     Plan of Care:    #Supply demand ischemia-  Secondary to HTN urgency.  Mr. Art is currently undergoing HD and denies chest pain.  Admission EKG is sinus with LVH and no ischemic changes.  Obtain echocardiogram.     #Sinus bradycardia-  No evidence of AV block on admission EKG.  Observe on telemetry.    #HTN urgency-  BP now improving with HD.    #ESRD-  HD as per renal.       Complex medical decision making.         76 minutes spent on total encounter; more than 50% of the visit was spent counseling and/or coordinating care by the attending physician.   	  Geovani Bravo MD [facility]  Cardiovascular Diseases  [phone]

## 2024-01-17 LAB
ANION GAP SERPL CALC-SCNC: 10 MMOL/L — SIGNIFICANT CHANGE UP (ref 7–14)
ANION GAP SERPL CALC-SCNC: 13 MMOL/L — SIGNIFICANT CHANGE UP (ref 7–14)
ANION GAP SERPL CALC-SCNC: 16 MMOL/L — HIGH (ref 7–14)
B PERT DNA SPEC QL NAA+PROBE: SIGNIFICANT CHANGE UP
B PERT+PARAPERT DNA PNL SPEC NAA+PROBE: SIGNIFICANT CHANGE UP
BASE EXCESS BLDV CALC-SCNC: 5.4 MMOL/L — HIGH (ref -2–3)
BLOOD GAS VENOUS COMPREHENSIVE RESULT: SIGNIFICANT CHANGE UP
BORDETELLA PARAPERTUSSIS (RAPRVP): SIGNIFICANT CHANGE UP
BUN SERPL-MCNC: 20 MG/DL — SIGNIFICANT CHANGE UP (ref 7–23)
BUN SERPL-MCNC: 21 MG/DL — SIGNIFICANT CHANGE UP (ref 7–23)
BUN SERPL-MCNC: 21 MG/DL — SIGNIFICANT CHANGE UP (ref 7–23)
C PNEUM DNA SPEC QL NAA+PROBE: SIGNIFICANT CHANGE UP
CALCIUM SERPL-MCNC: 8.8 MG/DL — SIGNIFICANT CHANGE UP (ref 8.4–10.5)
CALCIUM SERPL-MCNC: 8.9 MG/DL — SIGNIFICANT CHANGE UP (ref 8.4–10.5)
CALCIUM SERPL-MCNC: 9.4 MG/DL — SIGNIFICANT CHANGE UP (ref 8.4–10.5)
CHLORIDE BLDV-SCNC: 97 MMOL/L — SIGNIFICANT CHANGE UP (ref 96–108)
CHLORIDE SERPL-SCNC: 95 MMOL/L — LOW (ref 98–107)
CHLORIDE SERPL-SCNC: 97 MMOL/L — LOW (ref 98–107)
CHLORIDE SERPL-SCNC: 98 MMOL/L — SIGNIFICANT CHANGE UP (ref 98–107)
CO2 BLDV-SCNC: 31.9 MMOL/L — HIGH (ref 22–26)
CO2 SERPL-SCNC: 23 MMOL/L — SIGNIFICANT CHANGE UP (ref 22–31)
CO2 SERPL-SCNC: 23 MMOL/L — SIGNIFICANT CHANGE UP (ref 22–31)
CO2 SERPL-SCNC: 27 MMOL/L — SIGNIFICANT CHANGE UP (ref 22–31)
CREAT SERPL-MCNC: 4.28 MG/DL — HIGH (ref 0.5–1.3)
CREAT SERPL-MCNC: 4.38 MG/DL — HIGH (ref 0.5–1.3)
CREAT SERPL-MCNC: 4.76 MG/DL — HIGH (ref 0.5–1.3)
EGFR: 12 ML/MIN/1.73M2 — LOW
EGFR: 14 ML/MIN/1.73M2 — LOW
EGFR: 14 ML/MIN/1.73M2 — LOW
FLUAV SUBTYP SPEC NAA+PROBE: SIGNIFICANT CHANGE UP
FLUBV RNA SPEC QL NAA+PROBE: SIGNIFICANT CHANGE UP
GAS PNL BLDV: 131 MMOL/L — LOW (ref 136–145)
GLUCOSE BLDC GLUCOMTR-MCNC: 105 MG/DL — HIGH (ref 70–99)
GLUCOSE BLDC GLUCOMTR-MCNC: 109 MG/DL — HIGH (ref 70–99)
GLUCOSE BLDC GLUCOMTR-MCNC: 120 MG/DL — HIGH (ref 70–99)
GLUCOSE BLDC GLUCOMTR-MCNC: 126 MG/DL — HIGH (ref 70–99)
GLUCOSE BLDC GLUCOMTR-MCNC: 129 MG/DL — HIGH (ref 70–99)
GLUCOSE BLDC GLUCOMTR-MCNC: 132 MG/DL — HIGH (ref 70–99)
GLUCOSE BLDC GLUCOMTR-MCNC: 148 MG/DL — HIGH (ref 70–99)
GLUCOSE BLDC GLUCOMTR-MCNC: 38 MG/DL — CRITICAL LOW (ref 70–99)
GLUCOSE BLDC GLUCOMTR-MCNC: 94 MG/DL — SIGNIFICANT CHANGE UP (ref 70–99)
GLUCOSE BLDV-MCNC: 107 MG/DL — HIGH (ref 70–99)
GLUCOSE SERPL-MCNC: 102 MG/DL — HIGH (ref 70–99)
GLUCOSE SERPL-MCNC: 77 MG/DL — SIGNIFICANT CHANGE UP (ref 70–99)
GLUCOSE SERPL-MCNC: 96 MG/DL — SIGNIFICANT CHANGE UP (ref 70–99)
HADV DNA SPEC QL NAA+PROBE: SIGNIFICANT CHANGE UP
HCO3 BLDV-SCNC: 30 MMOL/L — HIGH (ref 22–29)
HCOV 229E RNA SPEC QL NAA+PROBE: SIGNIFICANT CHANGE UP
HCOV HKU1 RNA SPEC QL NAA+PROBE: SIGNIFICANT CHANGE UP
HCOV NL63 RNA SPEC QL NAA+PROBE: SIGNIFICANT CHANGE UP
HCOV OC43 RNA SPEC QL NAA+PROBE: SIGNIFICANT CHANGE UP
HCT VFR BLD CALC: 37.4 % — LOW (ref 39–50)
HCT VFR BLD CALC: 39.4 % — SIGNIFICANT CHANGE UP (ref 39–50)
HCT VFR BLDA CALC: 34 % — LOW (ref 39–51)
HGB BLD CALC-MCNC: 11.4 G/DL — LOW (ref 12.6–17.4)
HGB BLD-MCNC: 11.2 G/DL — LOW (ref 13–17)
HGB BLD-MCNC: 11.7 G/DL — LOW (ref 13–17)
HMPV RNA SPEC QL NAA+PROBE: SIGNIFICANT CHANGE UP
HPIV1 RNA SPEC QL NAA+PROBE: SIGNIFICANT CHANGE UP
HPIV2 RNA SPEC QL NAA+PROBE: SIGNIFICANT CHANGE UP
HPIV3 RNA SPEC QL NAA+PROBE: SIGNIFICANT CHANGE UP
HPIV4 RNA SPEC QL NAA+PROBE: SIGNIFICANT CHANGE UP
LACTATE BLDV-MCNC: 1.6 MMOL/L — SIGNIFICANT CHANGE UP (ref 0.5–2)
LACTATE SERPL-SCNC: 1.6 MMOL/L — SIGNIFICANT CHANGE UP (ref 0.5–2)
M PNEUMO DNA SPEC QL NAA+PROBE: SIGNIFICANT CHANGE UP
MAGNESIUM SERPL-MCNC: 2.4 MG/DL — SIGNIFICANT CHANGE UP (ref 1.6–2.6)
MAGNESIUM SERPL-MCNC: 2.5 MG/DL — SIGNIFICANT CHANGE UP (ref 1.6–2.6)
MAGNESIUM SERPL-MCNC: 2.6 MG/DL — SIGNIFICANT CHANGE UP (ref 1.6–2.6)
MCHC RBC-ENTMCNC: 18.9 PG — LOW (ref 27–34)
MCHC RBC-ENTMCNC: 18.9 PG — LOW (ref 27–34)
MCHC RBC-ENTMCNC: 29.7 GM/DL — LOW (ref 32–36)
MCHC RBC-ENTMCNC: 29.9 GM/DL — LOW (ref 32–36)
MCV RBC AUTO: 63.1 FL — LOW (ref 80–100)
MCV RBC AUTO: 63.8 FL — LOW (ref 80–100)
NRBC # BLD: 0 /100 WBCS — SIGNIFICANT CHANGE UP (ref 0–0)
NRBC # BLD: 0 /100 WBCS — SIGNIFICANT CHANGE UP (ref 0–0)
NRBC # FLD: 0 K/UL — SIGNIFICANT CHANGE UP (ref 0–0)
NRBC # FLD: 0.02 K/UL — HIGH (ref 0–0)
PCO2 BLDV: 46 MMHG — SIGNIFICANT CHANGE UP (ref 42–55)
PH BLDV: 7.43 — SIGNIFICANT CHANGE UP (ref 7.32–7.43)
PHOSPHATE SERPL-MCNC: 2.3 MG/DL — LOW (ref 2.5–4.5)
PHOSPHATE SERPL-MCNC: 2.4 MG/DL — LOW (ref 2.5–4.5)
PHOSPHATE SERPL-MCNC: 2.5 MG/DL — SIGNIFICANT CHANGE UP (ref 2.5–4.5)
PLATELET # BLD AUTO: 239 K/UL — SIGNIFICANT CHANGE UP (ref 150–400)
PLATELET # BLD AUTO: 244 K/UL — SIGNIFICANT CHANGE UP (ref 150–400)
PO2 BLDV: 77 MMHG — HIGH (ref 25–45)
POTASSIUM BLDV-SCNC: 4 MMOL/L — SIGNIFICANT CHANGE UP (ref 3.5–5.1)
POTASSIUM SERPL-MCNC: 4.1 MMOL/L — SIGNIFICANT CHANGE UP (ref 3.5–5.3)
POTASSIUM SERPL-MCNC: 4.6 MMOL/L — SIGNIFICANT CHANGE UP (ref 3.5–5.3)
POTASSIUM SERPL-MCNC: 5.3 MMOL/L — SIGNIFICANT CHANGE UP (ref 3.5–5.3)
POTASSIUM SERPL-SCNC: 4.1 MMOL/L — SIGNIFICANT CHANGE UP (ref 3.5–5.3)
POTASSIUM SERPL-SCNC: 4.6 MMOL/L — SIGNIFICANT CHANGE UP (ref 3.5–5.3)
POTASSIUM SERPL-SCNC: 5.3 MMOL/L — SIGNIFICANT CHANGE UP (ref 3.5–5.3)
RAPID RVP RESULT: SIGNIFICANT CHANGE UP
RBC # BLD: 5.93 M/UL — HIGH (ref 4.2–5.8)
RBC # BLD: 6.18 M/UL — HIGH (ref 4.2–5.8)
RBC # FLD: 20 % — HIGH (ref 10.3–14.5)
RBC # FLD: 20.1 % — HIGH (ref 10.3–14.5)
RSV RNA SPEC QL NAA+PROBE: SIGNIFICANT CHANGE UP
RV+EV RNA SPEC QL NAA+PROBE: SIGNIFICANT CHANGE UP
SAO2 % BLDV: 96.8 % — HIGH (ref 67–88)
SARS-COV-2 RNA SPEC QL NAA+PROBE: SIGNIFICANT CHANGE UP
SODIUM SERPL-SCNC: 131 MMOL/L — LOW (ref 135–145)
SODIUM SERPL-SCNC: 135 MMOL/L — SIGNIFICANT CHANGE UP (ref 135–145)
SODIUM SERPL-SCNC: 136 MMOL/L — SIGNIFICANT CHANGE UP (ref 135–145)
T3 SERPL-MCNC: 93 NG/DL — SIGNIFICANT CHANGE UP (ref 80–200)
T4 AB SER-ACNC: 9.78 UG/DL — SIGNIFICANT CHANGE UP (ref 5.1–13)
TSH SERPL-MCNC: 3.47 UIU/ML — SIGNIFICANT CHANGE UP (ref 0.27–4.2)
WBC # BLD: 8.71 K/UL — SIGNIFICANT CHANGE UP (ref 3.8–10.5)
WBC # BLD: 8.85 K/UL — SIGNIFICANT CHANGE UP (ref 3.8–10.5)
WBC # FLD AUTO: 8.71 K/UL — SIGNIFICANT CHANGE UP (ref 3.8–10.5)
WBC # FLD AUTO: 8.85 K/UL — SIGNIFICANT CHANGE UP (ref 3.8–10.5)

## 2024-01-17 PROCEDURE — 99232 SBSQ HOSP IP/OBS MODERATE 35: CPT

## 2024-01-17 PROCEDURE — 70450 CT HEAD/BRAIN W/O DYE: CPT | Mod: 26

## 2024-01-17 PROCEDURE — 71045 X-RAY EXAM CHEST 1 VIEW: CPT | Mod: 26

## 2024-01-17 RX ORDER — HYDRALAZINE HCL 50 MG
100 TABLET ORAL THREE TIMES A DAY
Refills: 0 | Status: DISCONTINUED | OUTPATIENT
Start: 2024-01-17 | End: 2024-01-19

## 2024-01-17 RX ORDER — NIFEDIPINE 30 MG
60 TABLET, EXTENDED RELEASE 24 HR ORAL
Refills: 0 | Status: DISCONTINUED | OUTPATIENT
Start: 2024-01-17 | End: 2024-01-19

## 2024-01-17 RX ORDER — SODIUM CHLORIDE 9 MG/ML
1000 INJECTION, SOLUTION INTRAVENOUS
Refills: 0 | Status: DISCONTINUED | OUTPATIENT
Start: 2024-01-17 | End: 2024-01-18

## 2024-01-17 RX ORDER — CARVEDILOL PHOSPHATE 80 MG/1
12.5 CAPSULE, EXTENDED RELEASE ORAL EVERY 12 HOURS
Refills: 0 | Status: DISCONTINUED | OUTPATIENT
Start: 2024-01-17 | End: 2024-01-19

## 2024-01-17 RX ADMIN — CALAMINE AND ZINC OXIDE AND PHENOL 1 APPLICATION(S): 160; 10 LOTION TOPICAL at 09:24

## 2024-01-17 RX ADMIN — CARVEDILOL PHOSPHATE 12.5 MILLIGRAM(S): 80 CAPSULE, EXTENDED RELEASE ORAL at 18:41

## 2024-01-17 RX ADMIN — HEPARIN SODIUM 5000 UNIT(S): 5000 INJECTION INTRAVENOUS; SUBCUTANEOUS at 09:25

## 2024-01-17 RX ADMIN — Medication 1334 MILLIGRAM(S): at 09:25

## 2024-01-17 RX ADMIN — CHLORHEXIDINE GLUCONATE 1 APPLICATION(S): 213 SOLUTION TOPICAL at 18:38

## 2024-01-17 RX ADMIN — Medication 60 MILLIGRAM(S): at 18:40

## 2024-01-17 RX ADMIN — ATORVASTATIN CALCIUM 20 MILLIGRAM(S): 80 TABLET, FILM COATED ORAL at 22:01

## 2024-01-17 RX ADMIN — SODIUM CHLORIDE 40 MILLILITER(S): 9 INJECTION, SOLUTION INTRAVENOUS at 00:42

## 2024-01-17 RX ADMIN — Medication 1334 MILLIGRAM(S): at 18:40

## 2024-01-17 RX ADMIN — Medication 100 MILLIGRAM(S): at 22:05

## 2024-01-17 RX ADMIN — Medication 100 MILLIGRAM(S): at 14:28

## 2024-01-17 RX ADMIN — HEPARIN SODIUM 5000 UNIT(S): 5000 INJECTION INTRAVENOUS; SUBCUTANEOUS at 22:01

## 2024-01-17 NOTE — PHYSICAL THERAPY INITIAL EVALUATION ADULT - MANUAL MUSCLE TESTING RESULTS, REHAB EVAL
cardiac precautions; bilateral upper extremities and bilateral lower extremities at least 3+/5/grossly assessed due to

## 2024-01-17 NOTE — PROGRESS NOTE ADULT - ASSESSMENT
72 y/o male w/ HTN, HLD, T2DM on insulin, ESRD (HD on MWF via R arm AVF. AVF creation surgery in 9/2022, HD since 12/2022)  for a little over a year, h/o poor compliance w meds and frequent admission, now being sent from the dialysis center 2/2 hypertensive urgency and hyperglycemia. Endocrinology consulted for management of T2DM.    Poorly controlled T2DM with hyperglycemia  - HbA1c: 14  - Home Regimen: not currently on medication, 9/22 and changed from novolin N 8/7 to tresiba 14 units daily and lyumjev 3-11 units sliding scale with meals, had novolog 70/30 filled 4/23.  - Endocrinologist: Dr. Tony  PLAN  - Hold lantus for now   - Hold standing Admelog for now   - Continue low dose Admelog correction scale pre-meal and low dose Admelog correction scale at bedtime  - Fingerstick BG before meals and bedtime  - Goal -180: hypoglycemia at bedtime; Pt received NPH yesterday morning. Lantus was held yesterday evening. Glucose WNL today will observe off stanfing insulin for now   - hypoglycemia protocol prn  - Carbohydrate consistent diet  - RD consult  Discharge plan:  - Discharge medications: basal/bolus, doses tbd  - Patient to call doctor with persistent high or low BG at home.   - Ensure patient has glucometer, test strips and lancets on discharge.  - Recommend routine outpatient ophthalmology, podiatry and endocrinology f/u    HTN  - Home regimen: coreg 25mg bid  PLAN  - Can check urine microalbumin outpatient  - Outpatient goal BP <130/80. Management per primary team.    HLD  - Home regimen: atorvastatin 20mg daily  PLAN  - Continue atorvastatin 20mg daily per primary team  - Can check lipid profile if not done recently    Alix Mccurdy  Nurse Practitioner  Division of Endocrinology & Diabetes  In house pager #[phone]    If before 9AM or after 6PM, or on weekends/holidays, please call endocrine answering service for assistance ([phone]).For nonurgent matters email [email or web address] for [email or web address].

## 2024-01-17 NOTE — PATIENT PROFILE ADULT - NSPROMEDSBROUGHTTOHOSP_GEN_A_NUR
no You can access the FollowMyHealth Patient Portal offered by [facility] by registering at the following website: [email or web address]. By joining [facility]’s FollowMyHealth portal, you will also be able to view your health information using other applications (apps) compatible with our system. You can access the FollowMyHealth Patient Portal offered by [facility] by registering at the following website: [email or web address]. By joining [email or web address]’s FollowMyHealth portal, you will also be able to view your health information using other applications (apps) compatible with our system.

## 2024-01-17 NOTE — PROGRESS NOTE ADULT - ASSESSMENT
74 y/o male w/ HTN, HLD, T2DM on insulin, ESRD (HD on MWF via R arm AVF. AVF creation surgery in 9/2022, HD since 12/2022)  for a little over a year, h/o poor compliance w meds and frequent admission, now being sent from the dialysis center 2/2 hypertensive urgency and hyperglycemia    Ptn is asymptomatic. FS in the ED close to 500. BP at the dialysis center: 200/80  Denies fevers, chills, nausea, vomiting, dizziness, chest pain, SOB, abdominal pain, dysuria, hematuria.     Hypertensive Urgency  ESRD on HD  Anemia, chronic  DKA    -HD as per renal  - anemia  -BP has stabilized,   - DKA resolved,  last evening ptn was found to be lethargic and hypoglycemic, Lantus was NOT dispensed. premeal insulin cancelled, now only on insulin on a sliding scale. FS since then ( immediately after d50 : 257 and 157)   - awaiting endo f/u  - poor medication compliance :"  i stopped using my meds"    dvt ppx wHSC    stable for DC home

## 2024-01-17 NOTE — PHYSICAL THERAPY INITIAL EVALUATION ADULT - PATIENT PROFILE REVIEW, REHAB EVAL
ACTIVITY: Ambulate as Tolerated; Spoke with JG Overton prior to PT evaluation--> Pt OK for PT consult/OOB activity; heart rate 63bpm/yes

## 2024-01-17 NOTE — PROGRESS NOTE ADULT - ASSESSMENT
81 yr old male with a pmh of HTN, HLD, T2DM on insulin, ESRD on HD MWF present with anemia    ESRD on HD MWF  via avf  last hd 1/15  plan for HD 1/17  continue HD schedule   consent in chart  renal diet    anemia  likely sec to ckd  epo with hd  transfuse to keep hb>8  monitor    htn  fluctuatin  U Fwith HD  monitor    ckd-mbd  monitor phos and calcium daily

## 2024-01-17 NOTE — PHYSICAL THERAPY INITIAL EVALUATION ADULT - PERTINENT HX OF CURRENT PROBLEM, REHAB EVAL
72 y/o male w/ HTN, HLD, T2DM on insulin, ESRD (HD on MWF via Right arm AVF. AVF creation surgery in 9/2022, HD since 12/2022)  for a little over a year, h/o poor compliance w meds and frequent admission, now being sent from the dialysis center 2/2 hypertensive urgency and hyperglycemia

## 2024-01-17 NOTE — PHYSICAL THERAPY INITIAL EVALUATION ADULT - GENERAL OBSERVATIONS, REHAB EVAL
Pt encountered in semisupine position, no distress, AxOx4, sleepy (just had dialysis), with +IV, and +cardiac monitor. Pt agreeable to participate in PT evaluation.

## 2024-01-17 NOTE — PROGRESS NOTE ADULT - SUBJECTIVE AND OBJECTIVE BOX
[facility]   MD ELIANE BHAGAT MD RUORU WONG, PA    TEL:  FROM 9 AM to 5 PM ---OFFICE: [phone]  AVAILABLE ON TEAMS    FROM 5 PM - 9 AM PLEASE CALL ANSWERING SERVICE: [phone]    RENAL FOLLOW UP NOTE--Date of Service 01-17-24 @ 10:13  --------------------------------------------------------------------------------  HPI:      Pt seen and examined at bedside.       PAST HISTORY  --------------------------------------------------------------------------------  No significant changes to PMH, PSH, FHx, SHx, unless otherwise noted    ALLERGIES & MEDICATIONS  --------------------------------------------------------------------------------  Allergies    No Known Allergies    Intolerances      Standing Inpatient Medications  atorvastatin 20 milliGRAM(s) Oral at bedtime  calcium acetate 1334 milliGRAM(s) Oral two times a day with meals  carvedilol 12.5 milliGRAM(s) Oral every 12 hours  chlorhexidine 4% Liquid 1 Application(s) Topical daily  dextrose 5% + sodium chloride 0.9%. 1000 milliLiter(s) IV Continuous <Continuous>  dextrose 5%. 1000 milliLiter(s) IV Continuous <Continuous>  dextrose 5%. 1000 milliLiter(s) IV Continuous <Continuous>  dextrose 50% Injectable 12.5 Gram(s) IV Push once  dextrose 50% Injectable 25 Gram(s) IV Push once  dextrose 50% Injectable 25 Gram(s) IV Push once  glucagon  Injectable 1 milliGRAM(s) IntraMuscular once  heparin   Injectable 5000 Unit(s) SubCutaneous every 12 hours  hydrALAZINE 100 milliGRAM(s) Oral three times a day  insulin lispro (ADMELOG) corrective regimen sliding scale   SubCutaneous at bedtime  insulin lispro (ADMELOG) corrective regimen sliding scale   SubCutaneous three times a day before meals  NIFEdipine XL 60 milliGRAM(s) Oral two times a day    PRN Inpatient Medications  dextrose Oral Gel 15 Gram(s) Oral once PRN  sodium chloride 0.9% Bolus. 100 milliLiter(s) IV Bolus every 5 minutes PRN      REVIEW OF SYSTEMS  --------------------------------------------------------------------------------  General: no fever  MSK: no edema     VITALS/PHYSICAL EXAM  --------------------------------------------------------------------------------  T(C): 36.8 (01-17-24 @ 09:47), Max: 37 (01-16-24 @ 15:53)  HR: 60 (01-17-24 @ 09:47) (40 - 65)  BP: 168/66 (01-17-24 @ 09:47) (113/67 - 168/66)  RR: 16 (01-17-24 @ 09:47) (12 - 20)  SpO2: 98% (01-17-24 @ 09:47) (98% - 100%)  Wt(kg): --  Height (cm): 172.7 (01-16-24 @ 05:45)      01-16-24 @ 07:01  -  01-17-24 @ 07:00  --------------------------------------------------------  IN: 400 mL / OUT: 1400 mL / NET: -1000 mL      Physical Exam:  	Gen: NAD  	HEENT: MMM  	Pulm: CTA B/L  	CV: S1S2  	Abd: Soft, +BS  	Ext: No LE edema B/L                      Neuro: Awake   	Skin: Warm and Dry   	Vascular access: NO HD catheter , avf           no belle  LABS/STUDIES  --------------------------------------------------------------------------------              11.2   8.85  >-----------<  244      [01-17-24 @ 06:22]              37.4     136  |  97  |  21  ----------------------------<  102      [01-17-24 @ 06:22]  4.6   |  23  |  4.76        Ca     8.9     [01-17-24 @ 06:22]      Mg     2.40     [01-17-24 @ 06:22]      Phos  2.4     [01-17-24 @ 06:22]    TPro  6.9  /  Alb  3.8  /  TBili  0.4  /  DBili  x   /  AST  26  /  ALT  9   /  AlkPhos  132  [01-15-24 @ 13:36]    PT/INR: PT 11.6 , INR 1.03       [01-15-24 @ 13:36]  PTT: 31.6       [01-15-24 @ 13:36]      Creatinine Trend:  SCr 4.76 [01-17 @ 06:22]  SCr 4.38 [01-17 @ 01:29]  SCr 4.28 [01-16 @ 22:47]  SCr 6.17 [01-16 @ 06:22]  SCr 6.08 [01-15 @ 13:36]    Urinalysis - [01-17-24 @ 06:22]      Color  / Appearance  / SG  / pH       Gluc 102 / Ketone   / Bili  / Urobili        Blood  / Protein  / Leuk Est  / Nitrite       RBC  / WBC  / Hyaline  / Gran  / Sq Epi  / Non Sq Epi  / Bacteria       TSH 3.47      [01-16-24 @ 22:47]    HBsAb <3.0      [01-16-24 @ 06:22]  HBsAg Nonreact      [01-16-24 @ 06:22]  HBcAb Nonreact      [01-16-24 @ 06:22]  HCV 0.09, Nonreact      [01-16-24 @ 06:22]

## 2024-01-17 NOTE — PATIENT PROFILE ADULT - FALL HARM RISK - UNIVERSAL INTERVENTIONS
Bed in lowest position, wheels locked, appropriate side rails in place/Call bell, personal items and telephone in reach/Instruct patient to call for assistance before getting out of bed or chair/Non-slip footwear when patient is out of bed/[place] to call system/Physically safe environment - no spills, clutter or unnecessary equipment/Purposeful Proactive Rounding/Room/bathroom lighting operational, light cord in reach

## 2024-01-17 NOTE — PROGRESS NOTE ADULT - SUBJECTIVE AND OBJECTIVE BOX
Patient is a 73y old  Male who presents with a chief complaint of     SUBJECTIVE / OVERNIGHT EVENTS: last evening ptn was found to be lethargic and hypoglycemic, Lantus was NOT dispensed. premeal insulin cancelled, now only on insulin on a sliding scale. FS since then ( immediately after d50 : 257 and 157) BP is stable    MEDICATIONS  (STANDING):  atorvastatin 20 milliGRAM(s) Oral at bedtime  calcium acetate 1334 milliGRAM(s) Oral two times a day with meals  carvedilol 12.5 milliGRAM(s) Oral every 12 hours  chlorhexidine 4% Liquid 1 Application(s) Topical daily  dextrose 5% + sodium chloride 0.9%. 1000 milliLiter(s) (40 mL/Hr) IV Continuous <Continuous>  dextrose 5%. 1000 milliLiter(s) (50 mL/Hr) IV Continuous <Continuous>  dextrose 5%. 1000 milliLiter(s) (100 mL/Hr) IV Continuous <Continuous>  dextrose 50% Injectable 25 Gram(s) IV Push once  dextrose 50% Injectable 12.5 Gram(s) IV Push once  dextrose 50% Injectable 25 Gram(s) IV Push once  glucagon  Injectable 1 milliGRAM(s) IntraMuscular once  heparin   Injectable 5000 Unit(s) SubCutaneous every 12 hours  hydrALAZINE 100 milliGRAM(s) Oral three times a day  insulin lispro (ADMELOG) corrective regimen sliding scale   SubCutaneous at bedtime  insulin lispro (ADMELOG) corrective regimen sliding scale   SubCutaneous three times a day before meals  NIFEdipine XL 60 milliGRAM(s) Oral two times a day    MEDICATIONS  (PRN):  dextrose Oral Gel 15 Gram(s) Oral once PRN Blood Glucose LESS THAN 70 milliGRAM(s)/deciliter  sodium chloride 0.9% Bolus. 100 milliLiter(s) IV Bolus every 5 minutes PRN SBP LESS THAN or EQUAL to 90 mmHg      Vital Signs Last 24 Hrs  T(F): 98.4 (01-17-24 @ 14:18), Max: 98.6 (01-16-24 @ 15:53)  HR: 63 (01-17-24 @ 14:18) (40 - 65)  BP: 149/74 (01-17-24 @ 14:18) (113/67 - 168/71)  RR: 18 (01-17-24 @ 14:18) (12 - 20)  SpO2: 100% (01-17-24 @ 14:18) (98% - 100%)  Telemetry:   CAPILLARY BLOOD GLUCOSE  126 (17 Jan 2024 04:00)  105 (17 Jan 2024 02:25)      POCT Blood Glucose.: 120 mg/dL (17 Jan 2024 13:10)  POCT Blood Glucose.: 132 mg/dL (17 Jan 2024 10:21)  POCT Blood Glucose.: 94 mg/dL (17 Jan 2024 08:52)  POCT Blood Glucose.: 126 mg/dL (17 Jan 2024 04:03)  POCT Blood Glucose.: 105 mg/dL (17 Jan 2024 02:23)  POCT Blood Glucose.: 109 mg/dL (16 Jan 2024 23:52)  POCT Blood Glucose.: 157 mg/dL (16 Jan 2024 22:14)  POCT Blood Glucose.: 205 mg/dL (16 Jan 2024 21:59)  POCT Blood Glucose.: 38 mg/dL (16 Jan 2024 21:42)  POCT Blood Glucose.: 39 mg/dL (16 Jan 2024 21:40)  POCT Blood Glucose.: 175 mg/dL (16 Jan 2024 17:08)    I&O's Summary    16 Jan 2024 07:01  -  17 Jan 2024 07:00  --------------------------------------------------------  IN: 400 mL / OUT: 1400 mL / NET: -1000 mL    17 Jan 2024 07:01  -  17 Jan 2024 15:34  --------------------------------------------------------  IN: 400 mL / OUT: 1400 mL / NET: -1000 mL        PHYSICAL EXAM:  GENERAL: NAD, well-developed  HEAD:  Atraumatic, Normocephalic  EYES: EOMI, PERRLA, conjunctiva and sclera clear  NECK: Supple, No JVD  CHEST/LUNG: Clear to auscultation bilaterally; No wheeze  HEART: Regular rate and rhythm; No murmurs, rubs, or gallops  ABDOMEN: Soft, Nontender, Nondistended; Bowel sounds present  EXTREMITIES:  2+ Peripheral Pulses, No clubbing, cyanosis, or edema  PSYCH: AAOx3  NEUROLOGY: non-focal  SKIN: No rashes or lesions    LABS:                        11.2   8.85  )-----------( 244      ( 17 Jan 2024 06:22 )             37.4     01-17    136  |  97<L>  |  21  ----------------------------<  102<H>  4.6   |  23  |  4.76<H>    Ca    8.9      17 Jan 2024 06:22  Phos  2.4     01-17  Mg     2.40     01-17            Urinalysis Basic - ( 17 Jan 2024 06:22 )    Color: x / Appearance: x / SG: x / pH: x  Gluc: 102 mg/dL / Ketone: x  / Bili: x / Urobili: x   Blood: x / Protein: x / Nitrite: x   Leuk Esterase: x / RBC: x / WBC x   Sq Epi: x / Non Sq Epi: x / Bacteria: x        RADIOLOGY & ADDITIONAL TESTS:    Imaging Personally Reviewed:    Consultant(s) Notes Reviewed:      Care Discussed with Consultants/Other Providers:

## 2024-01-17 NOTE — PROGRESS NOTE ADULT - SUBJECTIVE AND OBJECTIVE BOX
Chief Complaint: Type 2 DM    History: Pt seen at bedside. Pt tolerating oral diet. Pt denies nausea and vomiting/any signs of hypoglycemia. Pt reports an adequate appetite.     MEDICATIONS  (STANDING):  atorvastatin 20 milliGRAM(s) Oral at bedtime  calcium acetate 1334 milliGRAM(s) Oral two times a day with meals  carvedilol 12.5 milliGRAM(s) Oral every 12 hours  chlorhexidine 4% Liquid 1 Application(s) Topical daily  dextrose 5% + sodium chloride 0.9%. 1000 milliLiter(s) (40 mL/Hr) IV Continuous <Continuous>  dextrose 5%. 1000 milliLiter(s) (100 mL/Hr) IV Continuous <Continuous>  dextrose 5%. 1000 milliLiter(s) (50 mL/Hr) IV Continuous <Continuous>  dextrose 50% Injectable 12.5 Gram(s) IV Push once  dextrose 50% Injectable 25 Gram(s) IV Push once  dextrose 50% Injectable 25 Gram(s) IV Push once  glucagon  Injectable 1 milliGRAM(s) IntraMuscular once  heparin   Injectable 5000 Unit(s) SubCutaneous every 12 hours  hydrALAZINE 100 milliGRAM(s) Oral three times a day  insulin lispro (ADMELOG) corrective regimen sliding scale   SubCutaneous at bedtime  insulin lispro (ADMELOG) corrective regimen sliding scale   SubCutaneous three times a day before meals  NIFEdipine XL 60 milliGRAM(s) Oral two times a day    MEDICATIONS  (PRN):  dextrose Oral Gel 15 Gram(s) Oral once PRN Blood Glucose LESS THAN 70 milliGRAM(s)/deciliter  sodium chloride 0.9% Bolus. 100 milliLiter(s) IV Bolus every 5 minutes PRN SBP LESS THAN or EQUAL to 90 mmHg      Allergies: No Known Allergies      Review of Systems:  Respiratory: No SOB, no cough  GI: No nausea, vomiting, abdominal pain  Endocrine: no polyuria, polydipsia    PHYSICAL EXAM:  VITALS: T(C): 36.9 (01-17-24 @ 14:18)  T(F): 98.4 (01-17-24 @ 14:18), Max: 98.5 (01-17-24 @ 10:30)  HR: 63 (01-17-24 @ 14:18) (40 - 65)  BP: 149/74 (01-17-24 @ 14:18) (113/67 - 168/71)  RR:  (12 - 20)  SpO2:  (98% - 100%)  Wt(kg): --  GENERAL: NAD, well-groomed, well-developed  RESPIRATORY: No labored breathing   GI: Soft, nontender, non distended  PSYCH: Alert and oriented x 3, normal affect, normal mood      CAPILLARY BLOOD GLUCOSE  126 (17 Jan 2024 04:00)  105 (17 Jan 2024 02:25)      POCT Blood Glucose.: 120 mg/dL (17 Jan 2024 13:10)  POCT Blood Glucose.: 132 mg/dL (17 Jan 2024 10:21)  POCT Blood Glucose.: 94 mg/dL (17 Jan 2024 08:52)  POCT Blood Glucose.: 126 mg/dL (17 Jan 2024 04:03)  POCT Blood Glucose.: 105 mg/dL (17 Jan 2024 02:23)  POCT Blood Glucose.: 109 mg/dL (16 Jan 2024 23:52)  POCT Blood Glucose.: 157 mg/dL (16 Jan 2024 22:14)  POCT Blood Glucose.: 205 mg/dL (16 Jan 2024 21:59)  POCT Blood Glucose.: 38 mg/dL (16 Jan 2024 21:42)  POCT Blood Glucose.: 39 mg/dL (16 Jan 2024 21:40)    A1C with Estimated Average Glucose (01.15.24 @ 13:36)    A1C with Estimated Average Glucose Result: 14.0   Estimated Average Glucose: 355      01-17    136  |  97<L>  |  21  ----------------------------<  102<H>  4.6   |  23  |  4.76<H>    eGFR: 12<L>    Ca    8.9      01-17  Mg     2.40     01-17  Phos  2.4     01-17    TPro  6.9  /  Alb  3.8  /  TBili  0.4  /  DBili  x   /  AST  26  /  ALT  9   /  AlkPhos  132<H>  01-15      Thyroid Function Tests:  01-16 @ 22:47 TSH 3.47 FreeT4 -- T3 93 Anti TPO -- Anti Thyroglobulin Ab -- TSI --      Diet, DASH/TLC:   Sodium & Cholesterol Restricted  For patients receiving Renal Replacement - No Protein Restr, No Conc K, No Conc Phos, Low Sodium (RENAL) (01-15-24 @ 22:03) [Active]

## 2024-01-17 NOTE — PHYSICAL THERAPY INITIAL EVALUATION ADULT - ADDITIONAL COMMENTS
Pt reports that he lives in a private  house with his wife with ~3-4 steps to enter; (+)bilateral handrails; bedroom/bathroom is on the first floor. Prior to hospital admission, pt was completely independent and used no assistive device with ambulation.     Pt left comfortable on stretcher in hallway, NAD, all lines intact, all precautions maintained, and RN aware of PT evaluation.

## 2024-01-17 NOTE — PROGRESS NOTE ADULT - SUBJECTIVE AND OBJECTIVE BOX
Cardiovascular Disease Progress Note  Date of Service: 01-17-24 @ 08:23    Overnight events: No acute events overnight.    The patient is in no distress.   Otherwise review of systems negative    Objective Findings:  T(C): 36.9 (01-17-24 @ 06:00), Max: 37 (01-16-24 @ 15:53)  HR: 65 (01-17-24 @ 06:00) (40 - 65)  BP: 141/54 (01-17-24 @ 06:00) (113/67 - 155/76)  RR: 12 (01-17-24 @ 06:00) (12 - 20)  SpO2: 98% (01-17-24 @ 06:00) (98% - 100%)  Wt(kg): --  Daily     Daily       Physical Exam:  Gen: NAD; Patient resting comfortably  HEENT: EOMI, Normocephalic/ atraumatic  CV: RRR, normal S1 + S2, no m/r/g  Lungs:  Normal respiratory effort; clear to auscultation bilaterally  Abd: soft, non-tender; bowel sounds present  Ext: No edema; warm and well perfused    Telemetry: Sinus    Laboratory Data:                        11.2   8.85  )-----------( 244      ( 17 Jan 2024 06:22 )             37.4     01-17    136  |  97<L>  |  21  ----------------------------<  102<H>  4.6   |  23  |  4.76<H>    Ca    8.9      17 Jan 2024 06:22  Phos  2.4     01-17  Mg     2.40     01-17    TPro  6.9  /  Alb  3.8  /  TBili  0.4  /  DBili  x   /  AST  26  /  ALT  9   /  AlkPhos  132<H>  01-15    PT/INR - ( 15 Deuce 2024 13:36 )   PT: 11.6 sec;   INR: 1.03 ratio         PTT - ( 15 Deuce 2024 13:36 )  PTT:31.6 sec          Inpatient Medications:  MEDICATIONS  (STANDING):  atorvastatin 20 milliGRAM(s) Oral at bedtime  calamine/zinc oxide Lotion 1 Application(s) Topical once  calcium acetate 1334 milliGRAM(s) Oral two times a day with meals  carvedilol 12.5 milliGRAM(s) Oral every 12 hours  chlorhexidine 4% Liquid 1 Application(s) Topical daily  dextrose 5% + sodium chloride 0.9%. 1000 milliLiter(s) (40 mL/Hr) IV Continuous <Continuous>  dextrose 5%. 1000 milliLiter(s) (50 mL/Hr) IV Continuous <Continuous>  dextrose 5%. 1000 milliLiter(s) (100 mL/Hr) IV Continuous <Continuous>  dextrose 50% Injectable 25 Gram(s) IV Push once  dextrose 50% Injectable 12.5 Gram(s) IV Push once  dextrose 50% Injectable 25 Gram(s) IV Push once  glucagon  Injectable 1 milliGRAM(s) IntraMuscular once  heparin   Injectable 5000 Unit(s) SubCutaneous every 12 hours  hydrALAZINE 100 milliGRAM(s) Oral three times a day  insulin lispro (ADMELOG) corrective regimen sliding scale   SubCutaneous at bedtime  insulin lispro (ADMELOG) corrective regimen sliding scale   SubCutaneous three times a day before meals  NIFEdipine XL 60 milliGRAM(s) Oral two times a day      Assessment: 73 year old man with HTN, HLD, T2DM on insulin, and ESRD on HD presents with supply demand ischemia and HTN urgency.     Plan of Care:    #Supply demand ischemia-  Secondary to HTN urgency.  BP is now much improved post HD.   No acute findings on echocardiogram.   No plan for ischemic work up at this time given normal wall motion on echo, medication nonadherence, and the absence of chest pain.     #Sinus bradycardia-  No evidence of AV block on admission EKG.  Observe on telemetry.    #HTN urgency-  BP now improved post HD.    #ESRD-  HD as per renal.       #ACP (advance care planning)-  Advanced care planning was discussed with the patient.  Advance care planning forms were discussed. Risks, benefits and alternatives of medical treatment and procedures were discussed in detail and all questions were answered.   30 additional minutes spent addressing advance care plans.    Complex medical decision making.         Over 55 minutes spent on total encounter; more than 50% of the visit was spent counseling and/or coordinating care by the attending physician.      Geovani Bravo MD [facility]  Cardiovascular Disease  [phone]

## 2024-01-18 ENCOUNTER — TRANSCRIPTION ENCOUNTER (OUTPATIENT)
Age: 74
End: 2024-01-18

## 2024-01-18 ENCOUNTER — APPOINTMENT (OUTPATIENT)
Dept: PODIATRY | Facility: CLINIC | Age: 74
End: 2024-01-18

## 2024-01-18 LAB
ANION GAP SERPL CALC-SCNC: 20 MMOL/L — HIGH (ref 7–14)
BUN SERPL-MCNC: 14 MG/DL — SIGNIFICANT CHANGE UP (ref 7–23)
CALCIUM SERPL-MCNC: 9 MG/DL — SIGNIFICANT CHANGE UP (ref 8.4–10.5)
CHLORIDE SERPL-SCNC: 97 MMOL/L — LOW (ref 98–107)
CO2 SERPL-SCNC: 24 MMOL/L — SIGNIFICANT CHANGE UP (ref 22–31)
CREAT SERPL-MCNC: 3.77 MG/DL — HIGH (ref 0.5–1.3)
EGFR: 16 ML/MIN/1.73M2 — LOW
GLUCOSE BLDC GLUCOMTR-MCNC: 197 MG/DL — HIGH (ref 70–99)
GLUCOSE BLDC GLUCOMTR-MCNC: 215 MG/DL — HIGH (ref 70–99)
GLUCOSE BLDC GLUCOMTR-MCNC: 251 MG/DL — HIGH (ref 70–99)
GLUCOSE BLDC GLUCOMTR-MCNC: 345 MG/DL — HIGH (ref 70–99)
GLUCOSE BLDC GLUCOMTR-MCNC: 95 MG/DL — SIGNIFICANT CHANGE UP (ref 70–99)
GLUCOSE SERPL-MCNC: 102 MG/DL — HIGH (ref 70–99)
HCT VFR BLD CALC: 37.7 % — LOW (ref 39–50)
HGB BLD-MCNC: 11.1 G/DL — LOW (ref 13–17)
MAGNESIUM SERPL-MCNC: 2.3 MG/DL — SIGNIFICANT CHANGE UP (ref 1.6–2.6)
MCHC RBC-ENTMCNC: 18.9 PG — LOW (ref 27–34)
MCHC RBC-ENTMCNC: 29.4 GM/DL — LOW (ref 32–36)
MCV RBC AUTO: 64.1 FL — LOW (ref 80–100)
NRBC # BLD: 0 /100 WBCS — SIGNIFICANT CHANGE UP (ref 0–0)
NRBC # FLD: 0 K/UL — SIGNIFICANT CHANGE UP (ref 0–0)
PHOSPHATE SERPL-MCNC: 2.5 MG/DL — SIGNIFICANT CHANGE UP (ref 2.5–4.5)
PLATELET # BLD AUTO: 219 K/UL — SIGNIFICANT CHANGE UP (ref 150–400)
POTASSIUM SERPL-MCNC: 4.5 MMOL/L — SIGNIFICANT CHANGE UP (ref 3.5–5.3)
POTASSIUM SERPL-SCNC: 4.5 MMOL/L — SIGNIFICANT CHANGE UP (ref 3.5–5.3)
RBC # BLD: 5.88 M/UL — HIGH (ref 4.2–5.8)
RBC # FLD: 19.8 % — HIGH (ref 10.3–14.5)
SODIUM SERPL-SCNC: 141 MMOL/L — SIGNIFICANT CHANGE UP (ref 135–145)
WBC # BLD: 6.39 K/UL — SIGNIFICANT CHANGE UP (ref 3.8–10.5)
WBC # FLD AUTO: 6.39 K/UL — SIGNIFICANT CHANGE UP (ref 3.8–10.5)

## 2024-01-18 PROCEDURE — 99232 SBSQ HOSP IP/OBS MODERATE 35: CPT

## 2024-01-18 RX ORDER — ISOPROPYL ALCOHOL, BENZOCAINE .7; .06 ML/ML; ML/ML
0 SWAB TOPICAL
Qty: 100 | Refills: 0
Start: 2024-01-18

## 2024-01-18 RX ORDER — INSULIN ASPART 100 [IU]/ML
2 INJECTION, SOLUTION SUBCUTANEOUS
Qty: 1 | Refills: 0
Start: 2024-01-18 | End: 2024-02-16

## 2024-01-18 RX ORDER — INSULIN GLARGINE 100 [IU]/ML
6 INJECTION, SOLUTION SUBCUTANEOUS
Qty: 1 | Refills: 0
Start: 2024-01-18 | End: 2024-02-16

## 2024-01-18 RX ADMIN — Medication 100 MILLIGRAM(S): at 22:11

## 2024-01-18 RX ADMIN — Medication 60 MILLIGRAM(S): at 18:30

## 2024-01-18 RX ADMIN — CARVEDILOL PHOSPHATE 12.5 MILLIGRAM(S): 80 CAPSULE, EXTENDED RELEASE ORAL at 18:30

## 2024-01-18 RX ADMIN — ATORVASTATIN CALCIUM 20 MILLIGRAM(S): 80 TABLET, FILM COATED ORAL at 22:11

## 2024-01-18 RX ADMIN — Medication 2: at 22:14

## 2024-01-18 RX ADMIN — HEPARIN SODIUM 5000 UNIT(S): 5000 INJECTION INTRAVENOUS; SUBCUTANEOUS at 18:29

## 2024-01-18 RX ADMIN — Medication 1334 MILLIGRAM(S): at 11:24

## 2024-01-18 RX ADMIN — HEPARIN SODIUM 5000 UNIT(S): 5000 INJECTION INTRAVENOUS; SUBCUTANEOUS at 06:02

## 2024-01-18 RX ADMIN — CARVEDILOL PHOSPHATE 12.5 MILLIGRAM(S): 80 CAPSULE, EXTENDED RELEASE ORAL at 06:02

## 2024-01-18 RX ADMIN — Medication 100 MILLIGRAM(S): at 14:29

## 2024-01-18 RX ADMIN — Medication 2: at 18:24

## 2024-01-18 RX ADMIN — CHLORHEXIDINE GLUCONATE 1 APPLICATION(S): 213 SOLUTION TOPICAL at 12:22

## 2024-01-18 RX ADMIN — Medication 3: at 14:22

## 2024-01-18 RX ADMIN — Medication 100 MILLIGRAM(S): at 06:02

## 2024-01-18 RX ADMIN — Medication 60 MILLIGRAM(S): at 06:02

## 2024-01-18 NOTE — DISCHARGE NOTE PROVIDER - CARE PROVIDER_API CALL
Anthony Murphy  Endocrinology/Metab/Diabetes  [address], [address]  [place] [address]  Phone: [phone]  Fax: [phone]  Established Patient  Follow Up Time: 1 week   Anthony Murphy  Endocrinology/Metab/Diabetes  [address], [address]  [place] [address]  Phone: [phone]  Fax: [phone]  Established Patient  Follow Up Time: 1 week    Primary Care Physician,   Call and schedule outpatient follow up  Phone: (   )    -  Fax: (   )    -  Follow Up Time:

## 2024-01-18 NOTE — PROGRESS NOTE ADULT - ASSESSMENT
72 y/o male w/ HTN, HLD, T2DM on insulin, ESRD (HD on MWF via R arm AVF. AVF creation surgery in 9/2022, HD since 12/2022)  for a little over a year, h/o poor compliance w meds and frequent admission, now being sent from the dialysis center 2/2 hypertensive urgency and hyperglycemia    Ptn is asymptomatic. FS in the ED close to 500. BP at the dialysis center: 200/80  Denies fevers, chills, nausea, vomiting, dizziness, chest pain, SOB, abdominal pain, dysuria, hematuria.     Hypertensive Urgency  ESRD on HD  Anemia, chronic  DKA    -HD as per renal  - anemia  -BP has stabilized,   - DKA resolved,  in pm of 1/16  ptn was found to be lethargic and hypoglycemic, Lantus was NOT dispensed. premeal insulin cancelled, now only on insulin on a sliding scale. FS since then ( immediately after d50 : 257 and 157)   - hypoglycemia resolved but now ptn is again hyperglycemic, endo following   - hopefully post DC ptn will become compliant w meds    dvt ppx wHSC

## 2024-01-18 NOTE — DIETITIAN INITIAL EVALUATION ADULT - ADD RECOMMEND
1. Encourage & assist Pt with meals; Monitor PO diet tolerance; Honor food preferences;   2. Add Nephro-noam 1 cap daily for micronutrient coverage;   3. Monitor labs, hydration status;   4. Pt to follow up with appropriate RDN upon discharge for the purposes of long-term nutrition evaluation and diet education;

## 2024-01-18 NOTE — DISCHARGE NOTE PROVIDER - NSDCCPCAREPLAN_GEN_ALL_CORE_FT
PRINCIPAL DISCHARGE DIAGNOSIS  Diagnosis: Hypertension  Assessment and Plan of Treatment: you received blood pressure medications and hemodialysis, now your blood pressures have improved.   Echocardiogram (ultrasound of your heart) unremarkable)    Continue blood pressure medication regimen as directed. Follow a low salt/cholesterol diet. Monitor for any visual changes, headaches or dizziness.  Monitor blood pressure regularly.  Follow up with your primary care provider for further management for high blood pressure.  -Can check urine microalbumin outpatient with your doctor.      SECONDARY DISCHARGE DIAGNOSES  Diagnosis: Poorly controlled type 2 diabetes mellitus  Assessment and Plan of Treatment: A1C 14%, you were evaluated by the endocrinologist in the hospital and your insulin was adjusted  Continue your medication regimen and a consistent carbohydrate diet (Meaning eating the same amount of carbohydrates at the same time each day). Monitor blood glucose levels throughout the day before meals and at bedtime. Record blood sugars and bring to outpatient providers appointment in order to be reviewed by your doctor for management modifications. If your sugars are more than 400 or less than 70 you should contact your PCP immediately. Monitor for signs/symptoms of low blood glucose, such as, dizziness, altered mental status, or cool/clammy skin. In addition, monitor for signs/symptoms of high blood glucose, such as, feeling hot, dry, fatigued, or with increased thirst/urination. Make regular podiatry appointments in order to have feet checked for wounds and uncontrolled toe nail growth to prevent infections, as well as, appointments with an ophthalmologist to monitor your vision.      Diagnosis: ESRD on dialysis  Assessment and Plan of Treatment: stable Please continue to follow your dialysis schedule and refer to your primary provider/nephrologist for further care and monitoring of kidney function and electrolytes. Continue a renal restricted diet (Avoiding foods high in potassium and phosphorus), your prescribed medications, and supplementations as directed.       PRINCIPAL DISCHARGE DIAGNOSIS  Diagnosis: Hypertension  Assessment and Plan of Treatment: you received blood pressure medications and hemodialysis, now your blood pressures have improved.   Echocardiogram (ultrasound of your heart) unremarkable)    Continue blood pressure medication regimen as directed. Follow a low salt/cholesterol diet. Monitor for any visual changes, headaches or dizziness.  Monitor blood pressure regularly.  Follow up with your primary care provider for further management for high blood pressure.  -Can check urine microalbumin outpatient with your doctor.      SECONDARY DISCHARGE DIAGNOSES  Diagnosis: Poorly controlled type 2 diabetes mellitus  Assessment and Plan of Treatment: A1C 14%, you were evaluated by the endocrinologist in the hospital and your insulin was adjusted  Continue your medication regimen Tresiba  6 units subcutaneous injection at bedtime  and if fingerstick glucose above 180 before meals  at home  restart on Humalog kwikpen pen 2 units subcutaneous injection three times a day before meals (please hold if npo/not eating) and contune consistent carbohydrate diet (Meaning eating the same amount of carbohydrates at the same time each day). Monitor blood glucose levels throughout the day before meals and at bedtime. Record blood sugars and bring to outpatient providers appointment in order to be reviewed by your doctor for management modifications. If your sugars are more than 400 or less than 70 you should contact your PCP immediately. Monitor for signs/symptoms of low blood glucose, such as, dizziness, altered mental status, or cool/clammy skin. In addition, monitor for signs/symptoms of high blood glucose, such as, feeling hot, dry, fatigued, or with increased thirst/urination. Make regular podiatry appointments in order to have feet checked for wounds and uncontrolled toe nail growth to prevent infections, as well as, appointments with an ophthalmologist to monitor your vision.      Diagnosis: ESRD on dialysis  Assessment and Plan of Treatment: stable Please continue to follow your dialysis schedule and refer to your primary provider/nephrologist for further care and monitoring of kidney function and electrolytes. Continue a renal restricted diet (Avoiding foods high in potassium and phosphorus), your prescribed medications, and supplementations as directed.

## 2024-01-18 NOTE — PROGRESS NOTE ADULT - SUBJECTIVE AND OBJECTIVE BOX
Patient is a 73y old  Male who presents with a chief complaint of Primary hypertension     (18 Jan 2024 10:35)      SUBJECTIVE / OVERNIGHT EVENTS: hypoglycemia resolved but now ptn is again hyperglycemic, endo following , BP in good control    MEDICATIONS  (STANDING):  atorvastatin 20 milliGRAM(s) Oral at bedtime  carvedilol 12.5 milliGRAM(s) Oral every 12 hours  chlorhexidine 4% Liquid 1 Application(s) Topical daily  dextrose 5%. 1000 milliLiter(s) (50 mL/Hr) IV Continuous <Continuous>  dextrose 5%. 1000 milliLiter(s) (100 mL/Hr) IV Continuous <Continuous>  dextrose 50% Injectable 25 Gram(s) IV Push once  dextrose 50% Injectable 12.5 Gram(s) IV Push once  dextrose 50% Injectable 25 Gram(s) IV Push once  glucagon  Injectable 1 milliGRAM(s) IntraMuscular once  heparin   Injectable 5000 Unit(s) SubCutaneous every 12 hours  hydrALAZINE 100 milliGRAM(s) Oral three times a day  insulin lispro (ADMELOG) corrective regimen sliding scale   SubCutaneous three times a day before meals  insulin lispro (ADMELOG) corrective regimen sliding scale   SubCutaneous at bedtime  Nephro-noam 1 Tablet(s) Oral daily  NIFEdipine XL 60 milliGRAM(s) Oral two times a day    MEDICATIONS  (PRN):  dextrose Oral Gel 15 Gram(s) Oral once PRN Blood Glucose LESS THAN 70 milliGRAM(s)/deciliter  sodium chloride 0.9% Bolus. 100 milliLiter(s) IV Bolus every 5 minutes PRN SBP LESS THAN or EQUAL to 90 mmHg      Vital Signs Last 24 Hrs  T(F): 97.9 (01-18-24 @ 21:05), Max: 98.1 (01-18-24 @ 12:48)  HR: 59 (01-18-24 @ 21:05) (59 - 65)  BP: 125/66 (01-18-24 @ 21:05) (125/66 - 148/56)  RR: 18 (01-18-24 @ 21:05) (16 - 18)  SpO2: 100% (01-18-24 @ 21:05) (98% - 100%)  Telemetry:   CAPILLARY BLOOD GLUCOSE      POCT Blood Glucose.: 345 mg/dL (18 Jan 2024 21:16)  POCT Blood Glucose.: 215 mg/dL (18 Jan 2024 17:59)  POCT Blood Glucose.: 251 mg/dL (18 Jan 2024 14:20)  POCT Blood Glucose.: 197 mg/dL (18 Jan 2024 13:05)  POCT Blood Glucose.: 95 mg/dL (18 Jan 2024 09:08)    I&O's Summary    17 Jan 2024 07:01  -  18 Jan 2024 07:00  --------------------------------------------------------  IN: 400 mL / OUT: 1400 mL / NET: -1000 mL    18 Jan 2024 07:01  -  18 Jan 2024 22:26  --------------------------------------------------------  IN: 360 mL / OUT: 0 mL / NET: 360 mL        PHYSICAL EXAM:  GENERAL: NAD, well-developed  HEAD:  Atraumatic, Normocephalic  EYES: EOMI, PERRLA, conjunctiva and sclera clear  NECK: Supple, No JVD  CHEST/LUNG: Clear to auscultation bilaterally; No wheeze  HEART: Regular rate and rhythm; No murmurs, rubs, or gallops  ABDOMEN: Soft, Nontender, Nondistended; Bowel sounds present  EXTREMITIES:  2+ Peripheral Pulses, No clubbing, cyanosis, or edema  PSYCH: AAOx3  NEUROLOGY: non-focal  SKIN: No rashes or lesions    LABS:                        11.1   6.39  )-----------( 219      ( 18 Jan 2024 06:11 )             37.7     01-18    141  |  97<L>  |  14  ----------------------------<  102<H>  4.5   |  24  |  3.77<H>    Ca    9.0      18 Jan 2024 06:11  Phos  2.5     01-18  Mg     2.30     01-18            Urinalysis Basic - ( 18 Jan 2024 06:11 )    Color: x / Appearance: x / SG: x / pH: x  Gluc: 102 mg/dL / Ketone: x  / Bili: x / Urobili: x   Blood: x / Protein: x / Nitrite: x   Leuk Esterase: x / RBC: x / WBC x   Sq Epi: x / Non Sq Epi: x / Bacteria: x        RADIOLOGY & ADDITIONAL TESTS:    Imaging Personally Reviewed:    Consultant(s) Notes Reviewed:      Care Discussed with Consultants/Other Providers:

## 2024-01-18 NOTE — PROGRESS NOTE ADULT - SUBJECTIVE AND OBJECTIVE BOX
Chief Complaint: Type 2 DM     History: Pt seen at bedside. Pt tolerating oral diet. Pt denies nausea and vomiting/any signs of hypoglycemia. Pt reports an adequate appetite. Wife at bedside. Pauline [phone]    MEDICATIONS  (STANDING):  atorvastatin 20 milliGRAM(s) Oral at bedtime  carvedilol 12.5 milliGRAM(s) Oral every 12 hours  chlorhexidine 4% Liquid 1 Application(s) Topical daily  dextrose 5% + sodium chloride 0.9%. 1000 milliLiter(s) (40 mL/Hr) IV Continuous <Continuous>  dextrose 5%. 1000 milliLiter(s) (50 mL/Hr) IV Continuous <Continuous>  dextrose 5%. 1000 milliLiter(s) (100 mL/Hr) IV Continuous <Continuous>  dextrose 50% Injectable 25 Gram(s) IV Push once  dextrose 50% Injectable 12.5 Gram(s) IV Push once  dextrose 50% Injectable 25 Gram(s) IV Push once  glucagon  Injectable 1 milliGRAM(s) IntraMuscular once  heparin   Injectable 5000 Unit(s) SubCutaneous every 12 hours  hydrALAZINE 100 milliGRAM(s) Oral three times a day  insulin lispro (ADMELOG) corrective regimen sliding scale   SubCutaneous three times a day before meals  insulin lispro (ADMELOG) corrective regimen sliding scale   SubCutaneous at bedtime  NIFEdipine XL 60 milliGRAM(s) Oral two times a day    MEDICATIONS  (PRN):  dextrose Oral Gel 15 Gram(s) Oral once PRN Blood Glucose LESS THAN 70 milliGRAM(s)/deciliter  sodium chloride 0.9% Bolus. 100 milliLiter(s) IV Bolus every 5 minutes PRN SBP LESS THAN or EQUAL to 90 mmHg      Allergies: No Known Allergies    Review of Systems:  Respiratory: No SOB, no cough  GI: No nausea, vomiting, abdominal pain  Endocrine: no polyuria, polydipsia    PHYSICAL EXAM:  VITALS: T(C): 36.7 (01-18-24 @ 12:48)  T(F): 98.1 (01-18-24 @ 12:48), Max: 99.1 (01-17-24 @ 22:00)  HR: 61 (01-18-24 @ 12:48) (61 - 66)  BP: 126/46 (01-18-24 @ 12:48) (126/46 - 141/49)  RR:  (17 - 18)  SpO2:  (98% - 100%)  Wt(kg): --  GENERAL: NAD, well-groomed, well-developed  RESPIRATORY: No labored breathing   GI: Soft, nontender, non distended  PSYCH: Alert and oriented x 3, normal affect, normal mood      CAPILLARY BLOOD GLUCOSE  POCT Blood Glucose.: 251 mg/dL (18 Jan 2024 14:20)  POCT Blood Glucose.: 197 mg/dL (18 Jan 2024 13:05)  POCT Blood Glucose.: 95 mg/dL (18 Jan 2024 09:08)  POCT Blood Glucose.: 129 mg/dL (17 Jan 2024 22:04)  POCT Blood Glucose.: 148 mg/dL (17 Jan 2024 18:27)    A1C with Estimated Average Glucose (01.15.24 @ 13:36)    A1C with Estimated Average Glucose Result: 14.0   Estimated Average Glucose: 355      01-18    141  |  97<L>  |  14  ----------------------------<  102<H>  4.5   |  24  |  3.77<H>    eGFR: 16<L>    Ca    9.0      01-18  Mg     2.30     01-18  Phos  2.5     01-18      Thyroid Function Tests:  01-16 @ 22:47 TSH 3.47 FreeT4 -- T3 93 Anti TPO -- Anti Thyroglobulin Ab -- TSI --

## 2024-01-18 NOTE — DISCHARGE NOTE PROVIDER - HOSPITAL COURSE
72 y/o male w/ HTN, HLD, T2DM on insulin, ESRD (HD on MWF via R arm AVF. AVF creation surgery in 9/2022, HD since 12/2022)  for a little over a year, h/o poor compliance w meds and frequent admission, now being sent from the dialysis center 2/2 hypertensive urgency and hyperglycemia.    Hospital Course:   Hypertensive Urgency  - improved s/p HD  -Home regimen: coreg 25mg bid  -Cards following  -TTE normal findings   -Can check urine microalbumin outpatient    DM   - A1 14%  - Endo following- on ISS    ESRD on HD (MWF)   - poor medication compliance   - nephro consulted     Anemia  -likely sec to ckd  -epo with hd  -transfuse to keep hb>7    Supply demand ischemia  -cardiology consulted   -Secondary to HTN urgency.  -BP is now much improved post HD.   -No acute findings on echocardiogram.   -No plan for ischemic work up at this time given normal wall motion on echo, medication nonadherence, and the absence of chest pain.     Sinus bradycardia  -No evidence of AV block on admission EKG or telemetry.  -No indication for pacing at this time.     HLD  - Home regimen: atorvastatin 20mg daily- continue     Dispo: home    On_________, case was discussed with __________, patient is medically cleared and optimized for discharge today. All medications were reviewed with attending, and sent to mutually agreed upon pharmacy. 74 y/o male w/ HTN, HLD, T2DM on insulin, ESRD (HD on MWF via R arm AVF. AVF creation surgery in 9/2022, HD since 12/2022)  for a little over a year, h/o poor compliance w meds and frequent admission, now being sent from the dialysis center 2/2 hypertensive urgency and hyperglycemia.    Hospital Course:   Hypertensive Urgency  - improved s/p HD  -Home regimen: coreg 25mg bid  -Cards following  -TTE normal findings   -Can check urine microalbumin outpatient    DM   - A1 14%  - Endo following- on ISS  -Home regimen with Lantus and     ESRD on HD (MWF)   - poor medication compliance   - nephro consulted     Anemia  -likely sec to ckd  -epo with hd  -transfuse to keep hb>7    Supply demand ischemia  -cardiology consulted   -Secondary to HTN urgency.  -BP is now much improved post HD.   -No acute findings on echocardiogram.   -No plan for ischemic work up at this time given normal wall motion on echo, medication nonadherence, and the absence of chest pain.     Sinus bradycardia  -No evidence of AV block on admission EKG or telemetry.  -No indication for pacing at this time.     HLD  - Home regimen: atorvastatin 20mg daily- continue     Dispo: home    On 1/19/24 case was discussed with , patient is medically cleared and optimized for discharge today. All medications were reviewed with attending, and sent to mutually agreed upon pharmacy. 74 y/o male w/ HTN, HLD, T2DM on insulin, ESRD (HD on MWF via R arm AVF. AVF creation surgery in 9/2022, HD since 12/2022)  for a little over a year, h/o poor compliance w meds and frequent admission, now being sent from the dialysis center 2/2 hypertensive urgency and hyperglycemia.    Hospital Course:   Hypertensive Urgency  - improved s/p HD  -Home regimen: coreg 25mg bid  -Cards following  -TTE normal findings   -Can check urine microalbumin outpatient    DM   - A1 14%  - Endo following- on ISS  -Home regimen with tresiba  6 units sq qhs and if glucose above 180 preprandial at home may restart on Lyumjev kwikpen pen 2 units TID AC (please hold if npo/not eating)    ESRD on HD (MWF)   - poor medication compliance   - nephro consulted     Anemia  -likely sec to ckd  -epo with hd  -transfuse to keep hb>7    Supply demand ischemia  -cardiology consulted   -Secondary to HTN urgency.  -BP is now much improved post HD.   -No acute findings on echocardiogram.   -No plan for ischemic work up at this time given normal wall motion on echo, medication nonadherence, and the absence of chest pain.     Sinus bradycardia  -No evidence of AV block on admission EKG or telemetry.  -No indication for pacing at this time.     HLD  - Home regimen: atorvastatin 20mg daily- continue     Dispo: home    On 1/19/24 case was discussed with Endocrine team, Nephrologist, and with , patient is medically cleared and optimized for discharge today 1/19. All medications were reviewed with attending, and sent to mutually agreed upon pharmacy.

## 2024-01-18 NOTE — PROGRESS NOTE ADULT - ASSESSMENT
81 yr old male with a pmh of HTN, HLD, T2DM on insulin, ESRD on HD MWF present with anemia    ESRD on HD MWF  HD unit QV  via avf  continue MWF   consent in chart  renal diet    Anemia  likely sec to ckd  epo with hd  transfuse to keep hb>7  monitor    HTN  fluctuating  UF with HD  monitor    ckd-mbd  PO4 low--hold binders   monitor phos and calcium daily

## 2024-01-18 NOTE — PROGRESS NOTE ADULT - ASSESSMENT
74 y/o male w/ HTN, HLD, T2DM on insulin, ESRD (HD on MWF via R arm AVF. AVF creation surgery in 9/2022, HD since 12/2022)  for a little over a year, h/o poor compliance w meds and frequent admission, now being sent from the dialysis center 2/2 hypertensive urgency and hyperglycemia. Endocrinology consulted for management of T2DM.    Poorly controlled T2DM with hyperglycemia  - HbA1c: 14  - Home Regimen: not currently on medication, 9/22 and changed from novolin N 8/7 to tresiba 14 units daily and lyumjev 3-11 units sliding scale with meals, had novolog 70/30 filled 4/23. Stopped taking Insulin months ago.   - Endocrinologist: Dr. Tony  PLAN  - Goal -180:  below goal this morning without insulin. now above goal at lunch will continue to monitor fs on current insulin regimen.   - Hold lantus for now   - Hold standing Admelog for now   - Continue low dose Admelog correction scale pre-meal and low dose Admelog correction scale at bedtime  - Fingerstick BG before meals and bedtime  - hypoglycemia protocol prn  - Advised team to change to consistent carb diet   - RD consult  Discharge plan:  - Discharge medications initiall dc plan was basal/bolus; Pt not requiring standing insulin while inp. As per wife pt eats high carbs at home. Tentative dc plan  basal insulin (based on insurance coverage) 6 units sq qhs and if glucose above 180 preprandial at home may start on premeal insulin 2 units TID AC (please hold if npo/not eating)   - For any further titration would defer to outpt provider   - Pt states he is motivated to make dietary changes but unclear if he would adhere   - Please call your doctor if your fs is 70 or below and or 250 and above   - Reviewed importance of medication adherence, following a consistent carb diet, and glucose monitoring  - Hypoglycemia and intervention reviewed   - For severe hypoglycemia: Please prescribe Gvoke HypoPen One Pack 1mG/0.2mL subcutaneous solution: 1 mG subcutaneously as needed for severe hypoglycemia or Baqsimi One Pack 3mG nasal powder: 3 mg (one actuation) into a single nostril as needed for severe hypoglycemia. If not covered, please prescribe Glucagon Emergency Kit for Low Blood Sugar 1 mG injection: 1 mG intramuscularly as needed for severe hypoglycemia. Glucose tablets 4G (take 4 tablets) or 15G tablets for blood sugar less than 70 mG/dL repeat fingerstick in 15 minutes. Patient and family will need instructions on proper use and to call 911 after use.   - Ensure patient has working glucometer, test strips, lancets, alcohol pads, and BD brittni pen needles  - Please also prescribe glucose tabs, Baqsimi nasal spray or glucagon emergency kit for hypoglycemia risk   - Recommend routine outpatient ophthalmology, podiatry and endocrinology f/u  - Please follow up with [facility] with Dr. Murphy.  [address] [place] [address] 1[address] [place] [address]; [phone]: please call to make an appointment     HTN  - Home regimen: coreg 25mg bid  PLAN  - Can check urine microalbumin outpatient  - Outpatient goal BP <130/80. Management per primary team.    HLD  - Home regimen: atorvastatin 20mg daily  PLAN  - Continue atorvastatin 20mg daily per primary team  - Can check lipid profile if not done recently    Alix Mccurdy  Nurse Practitioner  Division of Endocrinology & Diabetes  In house pager #[phone]    If before 9AM or after 6PM, or on weekends/holidays, please call endocrine answering service for assistance ([phone]).For nonurgent matters email [email or web address] for assistance.    74 y/o male w/ HTN, HLD, T2DM on insulin, ESRD (HD on MWF via R arm AVF. AVF creation surgery in 9/2022, HD since 12/2022)  for a little over a year, h/o poor compliance w meds and frequent admission, now being sent from the dialysis center 2/2 hypertensive urgency and hyperglycemia. Endocrinology consulted for management of T2DM.    Poorly controlled T2DM with hyperglycemia  - HbA1c: 14  - Home Regimen: not currently on medication, 9/22 and changed from novolin N 8/7 to tresiba 14 units daily and lyumjev 3-11 units sliding scale with meals, had novolog 70/30 filled 4/23. Stopped taking Insulin months ago.   - Endocrinologist: Dr. Tony  PLAN  - Goal -180:  below goal this morning without insulin. now above goal at lunch to 197 had peaches and didn't eat lunch then fs repeated 251 prior to eating lunch.  will continue to monitor fs on current insulin regimen.   - Hold lantus for now   - Hold standing Admelog for now ; if pre-prandial glucose 180 x 2 please consider starting Admelog 2 units TID AC (please hold if npo/not eating)  - Continue low dose Admelog correction scale pre-meal and low dose Admelog correction scale at bedtime  - Fingerstick BG before meals and bedtime  - hypoglycemia protocol prn  - Advised team to change to consistent carb diet   - RD consult  Discharge plan:  - Discharge medications initiall dc plan was basal/bolus; Pt not requiring standing insulin while inp. As per wife pt eats high carbs at home. Tentative dc plan  basal insulin (based on insurance coverage) 6 units sq qhs and if glucose above 180 preprandial at home may start on premeal insulin pen  2 units TID AC (please hold if npo/not eating)   - For any further titration would defer to outpt provider   - Pt states he is motivated to make dietary changes but unclear if he would adhere   - Please call your doctor if your fs is 70 or below and or 250 and above   - Reviewed importance of medication adherence, following a consistent carb diet, and glucose monitoring  - Hypoglycemia and intervention reviewed   - For severe hypoglycemia: Please prescribe Gvoke HypoPen One Pack 1mG/0.2mL subcutaneous solution: 1 mG subcutaneously as needed for severe hypoglycemia or Baqsimi One Pack 3mG nasal powder: 3 mg (one actuation) into a single nostril as needed for severe hypoglycemia. If not covered, please prescribe Glucagon Emergency Kit for Low Blood Sugar 1 mG injection: 1 mG intramuscularly as needed for severe hypoglycemia. Glucose tablets 4G (take 4 tablets) or 15G tablets for blood sugar less than 70 mG/dL repeat fingerstick in 15 minutes. Patient and family will need instructions on proper use and to call 911 after use.   - Ensure patient has working glucometer, test strips, lancets, alcohol pads, and BD brittni pen needles  - Please also prescribe glucose tabs, Baqsimi nasal spray or glucagon emergency kit for hypoglycemia risk   - Recommend routine outpatient ophthalmology, podiatry and endocrinology f/u  - Please follow up with [facility] with Dr. Murphy.  [address] [place] [address]; [phone]: please call to make an appointment     HTN  - Home regimen: coreg 25mg bid  PLAN  - Can check urine microalbumin outpatient  - Outpatient goal BP <130/80. Management per primary team.    HLD  - Home regimen: atorvastatin 20mg daily  PLAN  - Continue atorvastatin 20mg daily per primary team  - Can check lipid profile if not done recently    Alix Mccurdy  Nurse Practitioner  Division of Endocrinology & Diabetes  In house pager #[phone]    If before 9AM or after 6PM, or on weekends/holidays, please call endocrine answering service for assistance ([phone]).For nonurgent matters email [email or web address] for assistance.    72 y/o male w/ HTN, HLD, T2DM on insulin, ESRD (HD on MWF via R arm AVF. AVF creation surgery in 9/2022, HD since 12/2022)  for a little over a year, h/o poor compliance w meds and frequent admission, now being sent from the dialysis center 2/2 hypertensive urgency and hyperglycemia. Endocrinology consulted for management of T2DM.    Poorly controlled T2DM with hyperglycemia  - HbA1c: 14  - Home Regimen: not currently on medication, 9/22 and changed from novolin N 8/7 to tresiba 14 units daily and lyumjev 3-11 units sliding scale with meals, had novolog 70/30 filled 4/23. Stopped taking Insulin months ago.   - Endocrinologist: Dr. Tony  PLAN  - Goal -180:  below goal this morning without insulin. now above goal at lunch to 197 had peaches and didn't eat lunch then fs repeated 251 prior to eating lunch.  will continue to monitor fs on current insulin regimen.   - Hold lantus for now   - Hold standing Admelog for now ; if pre-prandial glucose 180 x 2 please consider starting Admelog 2 units TID AC (please hold if npo/not eating)  - Continue low dose Admelog correction scale pre-meal and low dose Admelog correction scale at bedtime  - Fingerstick BG before meals and bedtime  - hypoglycemia protocol prn  - Advised team to change to consistent carb diet   - RD consult  Discharge plan:  - Discharge medications initiall dc plan was basal/bolus; Pt not requiring standing insulin while inp. As per wife pt eats high carbs at home. Tentative dc plan basal insulin (based on insurance coverage) 6 units sq qhs and if glucose above 180 preprandial at home may start on premeal insulin pen 2 units TID AC (please hold if npo/not eating)   - For any further titration would defer to outpt provider   - Pt states he is motivated to make dietary changes but unclear if he would adhere   - Please call your doctor if your fs is 70 or below and or 250 and above   - Reviewed importance of medication adherence, following a consistent carb diet, and glucose monitoring  - Hypoglycemia and intervention reviewed   - For severe hypoglycemia: Please prescribe Gvoke HypoPen One Pack 1mG/0.2mL subcutaneous solution: 1 mG subcutaneously as needed for severe hypoglycemia or Baqsimi One Pack 3mG nasal powder: 3 mg (one actuation) into a single nostril as needed for severe hypoglycemia. If not covered, please prescribe Glucagon Emergency Kit for Low Blood Sugar 1 mG injection: 1 mG intramuscularly as needed for severe hypoglycemia. Glucose tablets 4G (take 4 tablets) or 15G tablets for blood sugar less than 70 mG/dL repeat fingerstick in 15 minutes. Patient and family will need instructions on proper use and to call 911 after use.   - Ensure patient has working glucometer, test strips, lancets, alcohol pads, and BD brittni pen needles  - Please also prescribe glucose tabs, Baqsimi nasal spray or glucagon emergency kit for hypoglycemia risk   - Recommend routine outpatient ophthalmology, podiatry and endocrinology f/u  - Please follow up with [facility] with Dr. Murphy.  [address] [place] [address]; [phone]: please call to make an appointment     HTN  - Home regimen: coreg 25mg bid  PLAN  - Can check urine microalbumin outpatient  - Outpatient goal BP <130/80. Management per primary team.    HLD  - Home regimen: atorvastatin 20mg daily  PLAN  - Continue atorvastatin 20mg daily per primary team  - Can check lipid profile if not done recently    Alix Mccurdy  Nurse Practitioner  Division of Endocrinology & Diabetes  In house pager #[phone]    If before 9AM or after 6PM, or on weekends/holidays, please call endocrine answering service for assistance ([phone]).For nonurgent matters email [email or web address] for assistance.    72 y/o male w/ HTN, HLD, T2DM on insulin, ESRD (HD on MWF via R arm AVF. AVF creation surgery in 9/2022, HD since 12/2022)  for a little over a year, h/o poor compliance w meds and frequent admission, now being sent from the dialysis center 2/2 hypertensive urgency and hyperglycemia. Endocrinology consulted for management of T2DM.    Poorly controlled T2DM with hyperglycemia  - HbA1c: 14  - Home Regimen: not currently on medication, 9/22 and changed from novolin N 8/7 to tresiba 14 units daily and lyumjev 3-11 units sliding scale with meals, had novolog 70/30 filled 4/23. Stopped taking Insulin months ago.   - Endocrinologist: Dr. Tony  PLAN  - Goal -180:  below goal this morning without insulin. now above goal at lunch to 197 had peaches and didn't eat lunch then fs repeated 251 prior to eating lunch. Pt had a late lunch. Will continue to monitor fs on current insulin regimen.   - Hold lantus for now   - Hold standing Admelog for now ; if pre-prandial glucose 180 x 2 please consider starting Admelog 2 units TID AC (please hold if npo/not eating)  - Continue low dose Admelog correction scale pre-meal and low dose Admelog correction scale at bedtime  - Fingerstick BG before meals and bedtime  - hypoglycemia protocol prn  - Advised team to change to consistent carb diet   - RD consult  Discharge plan:  - Discharge medications initiall dc plan was basal/bolus; Pt not requiring standing insulin while inp. As per wife pt eats high carbs at home. Tentative dc plan basal insulin (based on insurance coverage) 6 units sq qhs and if glucose above 180 preprandial at home may start on premeal insulin pen 2 units TID AC (please hold if npo/not eating)   - For any further titration would defer to outpt provider   - Pt states he is motivated to make dietary changes but unclear if he would adhere   - Please call your doctor if your fs is 70 or below and or 250 and above   - Reviewed importance of medication adherence, following a consistent carb diet, and glucose monitoring  - Hypoglycemia and intervention reviewed   - For severe hypoglycemia: Please prescribe Gvoke HypoPen One Pack 1mG/0.2mL subcutaneous solution: 1 mG subcutaneously as needed for severe hypoglycemia or Baqsimi One Pack 3mG nasal powder: 3 mg (one actuation) into a single nostril as needed for severe hypoglycemia. If not covered, please prescribe Glucagon Emergency Kit for Low Blood Sugar 1 mG injection: 1 mG intramuscularly as needed for severe hypoglycemia. Glucose tablets 4G (take 4 tablets) or 15G tablets for blood sugar less than 70 mG/dL repeat fingerstick in 15 minutes. Patient and family will need instructions on proper use and to call 911 after use.   - Ensure patient has working glucometer, test strips, lancets, alcohol pads, and BD brittni pen needles  - Please also prescribe glucose tabs, Baqsimi nasal spray or glucagon emergency kit for hypoglycemia risk   - Recommend routine outpatient ophthalmology, podiatry and endocrinology f/u  - Wife Pauline states she will supervise pt administering insulin and with glucose monitoring. Both pt and wife know how to administer insulin via pen. Please review prior to discharge   - Please follow up with [facility] with Dr. Murphy.  [address] [place] [address]; [phone]: please call to make an appointment     HTN  - Home regimen: coreg 25mg bid  PLAN  - Can check urine microalbumin outpatient  - Outpatient goal BP <130/80. Management per primary team.    HLD  - Home regimen: atorvastatin 20mg daily  PLAN  - Continue atorvastatin 20mg daily per primary team  - Can check lipid profile if not done recently    Metabolic Acidosis   anion gap 20 co2 24   most likely renal related; glucose 102 on serum   trend anion gap   defer to renal and primary team     Alix Mccurdy  Nurse Practitioner  Division of Endocrinology & Diabetes  In house pager #[phone]    If before 9AM or after 6PM, or on weekends/holidays, please call endocrine answering service for assistance ([phone]).For nonurgent matters email [email or web address] for assistance.    72 y/o male w/ HTN, HLD, T2DM on insulin, ESRD (HD on MWF via R arm AVF. AVF creation surgery in 9/2022, HD since 12/2022)  for a little over a year, h/o poor compliance w meds and frequent admission, now being sent from the dialysis center 2/2 hypertensive urgency and hyperglycemia. Endocrinology consulted for management of T2DM.    Poorly controlled T2DM with hyperglycemia  - HbA1c: 14  - Home Regimen: not currently on medication, 9/22 and changed from novolin N 8/7 to tresiba 14 units daily and lyumjev 3-11 units sliding scale with meals, had novolog 70/30 filled 4/23. Stopped taking Insulin months ago.   - Endocrinologist: Dr. Tony  PLAN  - Goal -180:  below goal this morning without insulin. now above goal at lunch to 197 had peaches and didn't eat lunch then fs repeated 251 prior to eating lunch. Pt had a late lunch. Will continue to monitor fs on current insulin regimen.   - Hold lantus for now   - Hold standing Admelog for now ; if pre-prandial glucose 180 x 2 please consider starting Admelog 2 units TID AC (please hold if npo/not eating)  - Continue low dose Admelog correction scale pre-meal and low dose Admelog correction scale at bedtime  - Fingerstick BG before meals and bedtime  - hypoglycemia protocol prn  - Advised team to change to consistent carb diet   - RD consult  Discharge plan:  - Discharge medications initiall dc plan was basal/bolus; Pt not requiring standing insulin while inp. As per wife pt eats high carbs at home. Tentative dc plan basal insulin (based on insurance coverage) 6 units sq qhs and if glucose above 180 preprandial at home may start on premeal insulin pen 2 units TID AC (please hold if npo/not eating); final plan tbs based on inpt glucose requirements  - For any further titration would defer to outpt provider   - Pt states he is motivated to make dietary changes but unclear if he would adhere as per wife  - Please call your doctor if your fs is 70 or below and or 250 and above   - Reviewed importance of medication adherence, following a consistent carb diet, and glucose monitoring  - Hypoglycemia and intervention reviewed   - For severe hypoglycemia: Please prescribe Gvoke HypoPen One Pack 1mG/0.2mL subcutaneous solution: 1 mG subcutaneously as needed for severe hypoglycemia or Baqsimi One Pack 3mG nasal powder: 3 mg (one actuation) into a single nostril as needed for severe hypoglycemia. If not covered, please prescribe Glucagon Emergency Kit for Low Blood Sugar 1 mG injection: 1 mG intramuscularly as needed for severe hypoglycemia. Glucose tablets 4G (take 4 tablets) or 15G tablets for blood sugar less than 70 mG/dL repeat fingerstick in 15 minutes. Patient and family will need instructions on proper use and to call 911 after use.   - Ensure patient has working glucometer, test strips, lancets, alcohol pads, and BD brittni pen needles  - Please also prescribe glucose tabs, Baqsimi nasal spray or glucagon emergency kit for hypoglycemia risk   - Recommend routine outpatient ophthalmology, podiatry and endocrinology f/u  - Wife Pauline states she will supervise pt administering insulin and with glucose monitoring. Both pt and wife know how to administer insulin via pen. Please review prior to discharge   - Please follow up with [facility] with Dr. Murphy.  [address] [place] [address]; [phone]: please call to make an appointment     HTN  - Home regimen: coreg 25mg bid  PLAN  - Can check urine microalbumin outpatient  - Outpatient goal BP <130/80. Management per primary team.    HLD  - Home regimen: atorvastatin 20mg daily  PLAN  - Continue atorvastatin 20mg daily per primary team  - Can check lipid profile if not done recently    Metabolic Acidosis   anion gap 20 co2 24   most likely renal related; glucose 102 on serum ; low risk for DKA  trend anion gap   defer to renal and primary team     Alix Mccurdy  Nurse Practitioner  Division of Endocrinology & Diabetes  In house pager #[phone]    If before 9AM or after 6PM, or on weekends/holidays, please call endocrine answering service for assistance ([phone]).For nonurgent matters email [email or web address] for assistance.

## 2024-01-18 NOTE — DISCHARGE NOTE PROVIDER - CARE PROVIDERS DIRECT ADDRESSES
[email or web address]

## 2024-01-18 NOTE — DIETITIAN INITIAL EVALUATION ADULT - OTHER INFO
Pt 74 yo male with PMHx of HTN, HLD, T2DM on insulin, ESRD (HD on MWF - HD since 12/2022) with poor compliance w meds and frequent admission; presented from dialysis center 2/2 hypertensive urgency and hyperglycemia - per chart review.    At time of visit, Pt awake, but somewhat disoriented/depressed. Pt's spouse at bed side answered most of RD's questions during interview. Pt's appetite not well for past few week. Pt with unintentional weight loss ~30# in 8 months. Food preferences discussed; Rec to add PO supplement: Nepro - 2x daily to diet rx. No report of chewing or swallowing difficulty; no report of vomiting or diarrhea @ this time.     Pt's height: 68" & Pt's UBW: 155# "8 months ago"; Pt's actual body weight: 125#. Of note, Pt's HbA1c level 14.0% (1/15/24). Pt does not take his Insulin according to Dr's prescription, per spouse. Pt avoids Regular sugar/honey at home, reported. Better food options discussed; RD answered concerns related to diet. RD remains available, Pt & his spouse made aware.

## 2024-01-18 NOTE — PROGRESS NOTE ADULT - SUBJECTIVE AND OBJECTIVE BOX
[facility] [place]   MD ELIANE BHAGAT MD RUORU WONG, PA    TEL:  FROM 9 AM to 5 PM ---OFFICE: [phone]  AVAILABLE ON TEAMS    FROM 5 PM - 9 AM PLEASE CALL ANSWERING SERVICE: [phone]    RENAL FOLLOW UP NOTE--Date of Service 01-18-24 @ 12:49  --------------------------------------------------------------------------------  HPI:      Pt seen and examined at bedside.   Shak[place] SOB, chest pain     PAST HISTORY  --------------------------------------------------------------------------------  No significant changes to PMH, PSH, FHx, SHx, unless otherwise noted    ALLERGIES & MEDICATIONS  --------------------------------------------------------------------------------  Allergies    No Known Allergies    Intolerances      Standing Inpatient Medications  atorvastatin 20 milliGRAM(s) Oral at bedtime  calcium acetate 1334 milliGRAM(s) Oral two times a day with meals  carvedilol 12.5 milliGRAM(s) Oral every 12 hours  chlorhexidine 4% Liquid 1 Application(s) Topical daily  dextrose 5% + sodium chloride 0.9%. 1000 milliLiter(s) IV Continuous <Continuous>  dextrose 5%. 1000 milliLiter(s) IV Continuous <Continuous>  dextrose 5%. 1000 milliLiter(s) IV Continuous <Continuous>  dextrose 50% Injectable 12.5 Gram(s) IV Push once  dextrose 50% Injectable 25 Gram(s) IV Push once  dextrose 50% Injectable 25 Gram(s) IV Push once  glucagon  Injectable 1 milliGRAM(s) IntraMuscular once  heparin   Injectable 5000 Unit(s) SubCutaneous every 12 hours  hydrALAZINE 100 milliGRAM(s) Oral three times a day  insulin lispro (ADMELOG) corrective regimen sliding scale   SubCutaneous at bedtime  insulin lispro (ADMELOG) corrective regimen sliding scale   SubCutaneous three times a day before meals  NIFEdipine XL 60 milliGRAM(s) Oral two times a day    PRN Inpatient Medications  dextrose Oral Gel 15 Gram(s) Oral once PRN  sodium chloride 0.9% Bolus. 100 milliLiter(s) IV Bolus every 5 minutes PRN      REVIEW OF SYSTEMS  --------------------------------------------------------------------------------  General: no fever,  MSK: no edema     VITALS/PHYSICAL EXAM  --------------------------------------------------------------------------------  T(C): 37.3 (01-17-24 @ 22:00), Max: 37.3 (01-17-24 @ 22:00)  HR: 63 (01-17-24 @ 22:00) (62 - 66)  BP: 141/49 (01-17-24 @ 22:00) (127/58 - 168/71)  RR: 17 (01-17-24 @ 22:00) (16 - 18)  SpO2: 100% (01-17-24 @ 22:00) (100% - 100%)  Wt(kg): --    Weight (kg): 55.2 (01-17-24 @ 18:38)      01-17-24 @ 07:01  -  01-18-24 @ 07:00  --------------------------------------------------------  IN: 400 mL / OUT: 1400 mL / NET: -1000 mL      Physical Exam:  	Gen: NAD  	HEENT: MMM  	Pulm: CTA B/L  	CV: S1S2  	Abd: Soft, +BS  	Ext: No LE edema B/L                      Neuro: Awake   	Skin: Warm and Dry   	Vascular access: NO HD catheter            no  belle  LABS/STUDIES  --------------------------------------------------------------------------------              11.1   6.39  >-----------<  219      [01-18-24 @ 06:11]              37.7     141  |  97  |  14  ----------------------------<  102      [01-18-24 @ 06:11]  4.5   |  24  |  3.77        Ca     9.0     [01-18-24 @ 06:11]      Mg     2.30     [01-18-24 @ 06:11]      Phos  2.5     [01-18-24 @ 06:11]            Creatinine Trend:  SCr 3.77 [01-18 @ 06:11]  SCr 4.76 [01-17 @ 06:22]  SCr 4.38 [01-17 @ 01:29]  SCr 4.28 [01-16 @ 22:47]  SCr 6.17 [01-16 @ 06:22]    Urinalysis - [01-18-24 @ 06:11]      Color  / Appearance  / SG  / pH       Gluc 102 / Ketone   / Bili  / Urobili        Blood  / Protein  / Leuk Est  / Nitrite       RBC  / WBC  / Hyaline  / Gran  / Sq Epi  / Non Sq Epi  / Bacteria       TSH 3.47      [01-16-24 @ 22:47]    HBsAb <3.0      [01-16-24 @ 06:22]  HBsAg Nonreact      [01-16-24 @ 06:22]  HBcAb Nonreact      [01-16-24 @ 06:22]  HCV 0.09, Nonreact      [01-16-24 @ 06:22]

## 2024-01-18 NOTE — DISCHARGE NOTE PROVIDER - NSDCFUADDAPPT_GEN_ALL_CORE_FT
[facility] with Dr. Murphy.  [address] [id number]; [phone]: please call to make an appointment

## 2024-01-18 NOTE — DISCHARGE NOTE PROVIDER - NSDCMRMEDTOKEN_GEN_ALL_CORE_FT
alcohol swabs: Apply topically to affected area 4 times a day  atorvastatin 20 mg oral tablet: 1 tab(s) orally once a day  calcium acetate 667 mg oral tablet: 2 tab(s) orally 3 times a day  Coreg 25 mg oral tablet: 1 tab(s) orally 2 times a day  donepezil 10 mg oral tablet: 1 tab(s) orally once a day (at bedtime)  glucometer (per patient&#x27;s insurance): Test blood sugars four times a day. Dispense #1 glucometer.  glucose tablets: Follow instructions on bottle when sugar is low.  Insulin Pen Needles, 4mm: 1 application subcutaneously 4 times a day. ** Use with insulin pen **  lancets: 1 application subcutaneously 4 times a day  Lantus Solostar Pen 100 units/mL subcutaneous solution: 6 unit(s) subcutaneously once a day (at bedtime) PRICE CHECK ONLY  NIFEdipine 60 mg oral tablet, extended release: 2 tab(s) orally once a day  NovoLOG FlexPen 100 units/mL injectable solution: 2 unit(s) subcutaneously 3 times a day (with meals) PRICE CHECK ONLY    Please hold if not eating a meal  Retacrit 10,000 units/mL injectable solution: 1 units/kg intravenously Monday, Wednesday, and Friday   test strips (per patient&#x27;s insurance): 1 application subcutaneously 4 times a day. ** Compatible with patient&#x27;s glucometer **   alcohol swabs: Apply topically to affected area 4 times a day  atorvastatin 20 mg oral tablet: 1 tab(s) orally once a day  calcium acetate 667 mg oral tablet: 2 tab(s) orally 3 times a day  Coreg 25 mg oral tablet: 0.5 tab(s) orally 2 times a day 12.5 mg two times a day  donepezil 10 mg oral tablet: 1 tab(s) orally once a day (at bedtime)  glucometer (per patient&#x27;s insurance): Test blood sugars four times a day. Dispense #1 glucometer.  glucose tablets: Follow instructions on bottle when sugar is low.  Insulin Pen Needles, 4mm: 1 application subcutaneously 4 times a day. ** Use with insulin pen **  lancets: 1 application subcutaneously 4 times a day  Lantus Solostar Pen 100 units/mL subcutaneous solution: 6 unit(s) subcutaneously once a day (at bedtime) PRICE CHECK ONLY  NIFEdipine 60 mg oral tablet, extended release: 1 tab(s) orally 2 times a day  NovoLOG FlexPen 100 units/mL injectable solution: 2 unit(s) subcutaneously 3 times a day (with meals) PRICE CHECK ONLY    Please hold if not eating a meal  Retacrit 10,000 units/mL injectable solution: 1 units/kg intravenously Monday, Wednesday, and Friday   test strips (per patient&#x27;s insurance): 1 application subcutaneously 4 times a day. ** Compatible with patient&#x27;s glucometer **   alcohol swabs: Apply topically to affected area 4 times a day  atorvastatin 20 mg oral tablet: 1 tab(s) orally once a day  calcium acetate 667 mg oral tablet: 2 tab(s) orally 3 times a day  Coreg 25 mg oral tablet: 0.5 tab(s) orally 2 times a day 12.5 mg two times a day  glucometer (per patient&#x27;s insurance): Test blood sugars four times a day. Dispense #1 glucometer.  glucose tablets: Follow instructions on bottle when sugar is low.  Insulin Pen Needles, 4mm: 1 application subcutaneously 4 times a day. ** Use with insulin pen **  lancets: 1 application subcutaneously 4 times a day  Lyumjev KwikPen 100 units/mL injectable solution: 2 unit(s) injectable 3 times a day (before meals)  NIFEdipine 60 mg oral tablet, extended release: 1 tab(s) orally 2 times a day  Retacrit 10,000 units/mL injectable solution: 1 units/kg intravenously Monday, Wednesday, and Friday   test strips (per patient&#x27;s insurance): 1 application subcutaneously 4 times a day. ** Compatible with patient&#x27;s glucometer **  Tresiba 100 units/mL subcutaneous solution: 6 unit(s) subcutaneous once a day (at bedtime)   alcohol swabs: Apply topically to affected area 4 times a day  atorvastatin 20 mg oral tablet: 1 tab(s) orally once a day  Coreg 25 mg oral tablet: 0.5 tab(s) orally 2 times a day 12.5 mg two times a day  glucometer (per patient&#x27;s insurance): Test blood sugars four times a day. Dispense #1 glucometer.  glucose tablets: Follow instructions on bottle when sugar is low.  hydrALAZINE 100 mg oral tablet: 1 tab(s) orally 3 times a day  Insulin Pen Needles, 4mm: 1 application subcutaneously 4 times a day. ** Use with insulin pen **  lancets: 1 application subcutaneously 4 times a day  Lyumjev KwikPen 100 units/mL injectable solution: 2 unit(s) injectable 3 times a day (before meals)  NIFEdipine 60 mg oral tablet, extended release: 1 tab(s) orally 2 times a day  Retacrit 10,000 units/mL injectable solution: 1 units/kg intravenously Monday, Wednesday, and Friday   test strips (per patient&#x27;s insurance): 1 application subcutaneously 4 times a day. ** Compatible with patient&#x27;s glucometer **  Tresiba 100 units/mL subcutaneous solution: 6 unit(s) subcutaneous once a day (at bedtime)   alcohol swabs: Apply topically to affected area 4 times a day  atorvastatin 20 mg oral tablet: 1 tab(s) orally once a day  calamine topical lotion: 1 Apply topically to affected area 3 times a day As needed Itching  Coreg 25 mg oral tablet: 0.5 tab(s) orally 2 times a day 12.5 mg two times a day  glucometer (per patient&#x27;s insurance): Test blood sugars four times a day. Dispense #1 glucometer.  glucose tablets: Follow instructions on bottle when sugar is low.  hydrALAZINE 100 mg oral tablet: 1 tab(s) orally 3 times a day  Insulin Pen Needles, 4mm: 1 application subcutaneously 4 times a day. ** Use with insulin pen **  lancets: 1 application subcutaneously 4 times a day  Lyumjev KwikPen 100 units/mL injectable solution: 2 unit(s) injectable 3 times a day (before meals)  NIFEdipine 60 mg oral tablet, extended release: 1 tab(s) orally 2 times a day  Retacrit 10,000 units/mL injectable solution: 1 units/kg intravenously Monday, Wednesday, and Friday   test strips (per patient&#x27;s insurance): 1 application subcutaneously 4 times a day. ** Compatible with patient&#x27;s glucometer **  Tresiba 100 units/mL subcutaneous solution: 6 unit(s) subcutaneous once a day (at bedtime)

## 2024-01-18 NOTE — DIETITIAN INITIAL EVALUATION ADULT - PERTINENT LABORATORY DATA
01-18    141  |  97<L>  |  14  ----------------------------<  102<H>  4.5   |  24  |  3.77<H>    Ca    9.0      18 Jan 2024 06:11  Phos  2.5     01-18  Mg     2.30     01-18    POCT Blood Glucose.: 251 mg/dL (01-18-24 @ 14:20)  A1C with Estimated Average Glucose Result: 14.0 % (01-15-24 @ 13:36)

## 2024-01-18 NOTE — DIETITIAN INITIAL EVALUATION ADULT - NS FNS DIET ORDER
DASH/TLC: Sodium & Cholesterol Restricted For patients receiving Renal Replacement - No Protein Restr, No Conc K, No Conc Phos, Low Sodium (RENAL) (01-15-24 @ 22:03) [Active]

## 2024-01-18 NOTE — PHARMACOTHERAPY INTERVENTION NOTE - COMMENTS
Pt educated on A1c, hyperglycemia and complications, insulin pen administration, hypoglycemia and treatment, healthy plate, exercise, and when to check BG, pt with poor return demo and verbalized understanding. Pt encouraged for outpt f/u with medicine and endocrine. Gave pt DM2 handouts (A1c, basal/bolus insulin, hypoglycemia & treatment, healthy plate, nutrition facts label). Counseled pt on where to inject insulin (abdomen, outer thighs), rotating injection site to prevent lipodystrophy), proper insulin storage, and sharps disposal. Pt seemingly disinterested in counseling, stopped taking his insulin months ago which led to A1C of 14%. Explained to patient that better control of DM is required, says he will manage. Will try to speak with wife as well.    Tim Murillo, PharmD  Clinical Pharmacy Specialist  [phone]

## 2024-01-18 NOTE — DISCHARGE NOTE PROVIDER - PROVIDER TOKENS
PROVIDER:[TOKEN:[[id number]],FOLLOWUP:[1 week],ESTABLISHEDPATIENT:[T]] PROVIDER:[TOKEN:[[id number]],FOLLOWUP:[1 week],ESTABLISHEDPATIENT:[T]],FREE:[LAST:[Primary Care Physician],PHONE:[(   )    -],FAX:[(   )    -],ADDRESS:[Call and schedule outpatient follow up]]

## 2024-01-19 ENCOUNTER — TRANSCRIPTION ENCOUNTER (OUTPATIENT)
Age: 74
End: 2024-01-19

## 2024-01-19 VITALS
DIASTOLIC BLOOD PRESSURE: 60 MMHG | OXYGEN SATURATION: 97 % | TEMPERATURE: 98 F | HEART RATE: 62 BPM | RESPIRATION RATE: 16 BRPM | SYSTOLIC BLOOD PRESSURE: 144 MMHG

## 2024-01-19 LAB
ANION GAP SERPL CALC-SCNC: 13 MMOL/L — SIGNIFICANT CHANGE UP (ref 7–14)
BUN SERPL-MCNC: 25 MG/DL — HIGH (ref 7–23)
CALCIUM SERPL-MCNC: 8.5 MG/DL — SIGNIFICANT CHANGE UP (ref 8.4–10.5)
CHLORIDE SERPL-SCNC: 94 MMOL/L — LOW (ref 98–107)
CO2 SERPL-SCNC: 25 MMOL/L — SIGNIFICANT CHANGE UP (ref 22–31)
CREAT SERPL-MCNC: 5.49 MG/DL — HIGH (ref 0.5–1.3)
EGFR: 10 ML/MIN/1.73M2 — LOW
GLUCOSE BLDC GLUCOMTR-MCNC: 123 MG/DL — HIGH (ref 70–99)
GLUCOSE BLDC GLUCOMTR-MCNC: 162 MG/DL — HIGH (ref 70–99)
GLUCOSE BLDC GLUCOMTR-MCNC: 244 MG/DL — HIGH (ref 70–99)
GLUCOSE BLDC GLUCOMTR-MCNC: 317 MG/DL — HIGH (ref 70–99)
GLUCOSE SERPL-MCNC: 223 MG/DL — HIGH (ref 70–99)
HCT VFR BLD CALC: 31.1 % — LOW (ref 39–50)
HGB BLD-MCNC: 9.7 G/DL — LOW (ref 13–17)
MAGNESIUM SERPL-MCNC: 2.3 MG/DL — SIGNIFICANT CHANGE UP (ref 1.6–2.6)
MCHC RBC-ENTMCNC: 19 PG — LOW (ref 27–34)
MCHC RBC-ENTMCNC: 31.2 GM/DL — LOW (ref 32–36)
MCV RBC AUTO: 60.9 FL — LOW (ref 80–100)
NRBC # BLD: 0 /100 WBCS — SIGNIFICANT CHANGE UP (ref 0–0)
NRBC # FLD: 0 K/UL — SIGNIFICANT CHANGE UP (ref 0–0)
PHOSPHATE SERPL-MCNC: 2.7 MG/DL — SIGNIFICANT CHANGE UP (ref 2.5–4.5)
PLATELET # BLD AUTO: 210 K/UL — SIGNIFICANT CHANGE UP (ref 150–400)
POTASSIUM SERPL-MCNC: 4.1 MMOL/L — SIGNIFICANT CHANGE UP (ref 3.5–5.3)
POTASSIUM SERPL-SCNC: 4.1 MMOL/L — SIGNIFICANT CHANGE UP (ref 3.5–5.3)
RBC # BLD: 5.11 M/UL — SIGNIFICANT CHANGE UP (ref 4.2–5.8)
RBC # FLD: 19.1 % — HIGH (ref 10.3–14.5)
SARS-COV-2 RNA SPEC QL NAA+PROBE: SIGNIFICANT CHANGE UP
SODIUM SERPL-SCNC: 132 MMOL/L — LOW (ref 135–145)
WBC # BLD: 5.31 K/UL — SIGNIFICANT CHANGE UP (ref 3.8–10.5)
WBC # FLD AUTO: 5.31 K/UL — SIGNIFICANT CHANGE UP (ref 3.8–10.5)

## 2024-01-19 PROCEDURE — 99232 SBSQ HOSP IP/OBS MODERATE 35: CPT

## 2024-01-19 RX ORDER — CALAMINE AND ZINC OXIDE AND PHENOL 160; 10 MG/ML; MG/ML
1 LOTION TOPICAL THREE TIMES A DAY
Refills: 0 | Status: DISCONTINUED | OUTPATIENT
Start: 2024-01-19 | End: 2024-01-19

## 2024-01-19 RX ORDER — ISOPROPYL ALCOHOL, BENZOCAINE .7; .06 ML/ML; ML/ML
0 SWAB TOPICAL
Qty: 100 | Refills: 0
Start: 2024-01-19

## 2024-01-19 RX ORDER — NIFEDIPINE 30 MG
1 TABLET, EXTENDED RELEASE 24 HR ORAL
Qty: 60 | Refills: 0
Start: 2024-01-19 | End: 2024-02-17

## 2024-01-19 RX ORDER — INSULIN LISPRO 100/ML
2 VIAL (ML) SUBCUTANEOUS
Qty: 1 | Refills: 0
Start: 2024-01-19 | End: 2024-02-17

## 2024-01-19 RX ORDER — HYDRALAZINE HCL 50 MG
1 TABLET ORAL
Qty: 90 | Refills: 0
Start: 2024-01-19 | End: 2024-02-17

## 2024-01-19 RX ORDER — INSULIN DEGLUDEC 100 U/ML
6 INJECTION, SOLUTION SUBCUTANEOUS
Qty: 1 | Refills: 0
Start: 2024-01-19

## 2024-01-19 RX ORDER — DONEPEZIL HYDROCHLORIDE 10 MG/1
1 TABLET, FILM COATED ORAL
Refills: 0 | DISCHARGE

## 2024-01-19 RX ORDER — CALAMINE AND ZINC OXIDE AND PHENOL 160; 10 MG/ML; MG/ML
1 LOTION TOPICAL
Qty: 0 | Refills: 0 | DISCHARGE
Start: 2024-01-19

## 2024-01-19 RX ADMIN — CHLORHEXIDINE GLUCONATE 1 APPLICATION(S): 213 SOLUTION TOPICAL at 15:23

## 2024-01-19 RX ADMIN — Medication 4: at 17:57

## 2024-01-19 RX ADMIN — Medication 1: at 12:17

## 2024-01-19 RX ADMIN — Medication 60 MILLIGRAM(S): at 17:57

## 2024-01-19 RX ADMIN — Medication 1 TABLET(S): at 12:18

## 2024-01-19 RX ADMIN — Medication 100 MILLIGRAM(S): at 15:08

## 2024-01-19 RX ADMIN — CARVEDILOL PHOSPHATE 12.5 MILLIGRAM(S): 80 CAPSULE, EXTENDED RELEASE ORAL at 17:57

## 2024-01-19 NOTE — DISCHARGE NOTE NURSING/CASE MANAGEMENT/SOCIAL WORK - NSDCFUADDAPPT_GEN_ALL_CORE_FT
[facility] with Dr. Murphy.  [address] [id number]; [phone]: please call to make an appointment

## 2024-01-19 NOTE — PROGRESS NOTE ADULT - SUBJECTIVE AND OBJECTIVE BOX
[facility] [place]   MD ELIANE BHAGAT MD RUORU WONG, PA    TEL:  FROM 9 AM to 5 PM ---OFFICE: [phone]  AVAILABLE ON TEAMS    FROM 5 PM - 9 AM PLEASE CALL ANSWERING SERVICE: [phone]    RENAL FOLLOW UP NOTE--Date of Service 01-19-24 @ 16:10  --------------------------------------------------------------------------------  HPI:      Pt seen and examined at bedside.     PAST HISTORY  --------------------------------------------------------------------------------  No significant changes to PMH, PSH, FHx, SHx, unless otherwise noted    ALLERGIES & MEDICATIONS  --------------------------------------------------------------------------------  Allergies    No Known Allergies    Intolerances      Standing Inpatient Medications  atorvastatin 20 milliGRAM(s) Oral at bedtime  carvedilol 12.5 milliGRAM(s) Oral every 12 hours  chlorhexidine 4% Liquid 1 Application(s) Topical daily  dextrose 5%. 1000 milliLiter(s) IV Continuous <Continuous>  dextrose 5%. 1000 milliLiter(s) IV Continuous <Continuous>  dextrose 50% Injectable 12.5 Gram(s) IV Push once  dextrose 50% Injectable 25 Gram(s) IV Push once  dextrose 50% Injectable 25 Gram(s) IV Push once  glucagon  Injectable 1 milliGRAM(s) IntraMuscular once  heparin   Injectable 5000 Unit(s) SubCutaneous every 12 hours  hydrALAZINE 100 milliGRAM(s) Oral three times a day  insulin lispro (ADMELOG) corrective regimen sliding scale   SubCutaneous at bedtime  insulin lispro (ADMELOG) corrective regimen sliding scale   SubCutaneous three times a day before meals  Nephro-noam 1 Tablet(s) Oral daily  NIFEdipine XL 60 milliGRAM(s) Oral two times a day    PRN Inpatient Medications  dextrose Oral Gel 15 Gram(s) Oral once PRN  sodium chloride 0.9% Bolus. 100 milliLiter(s) IV Bolus every 5 minutes PRN      REVIEW OF SYSTEMS  --------------------------------------------------------------------------------  General: no fever  MSK: no edema     VITALS/PHYSICAL EXAM  --------------------------------------------------------------------------------  T(C): 36.7 (01-19-24 @ 12:38), Max: 37.2 (01-19-24 @ 09:44)  HR: 63 (01-19-24 @ 12:38) (55 - 63)  BP: 136/48 (01-19-24 @ 12:38) (124/74 - 150/67)  RR: 17 (01-19-24 @ 12:38) (16 - 18)  SpO2: 97% (01-19-24 @ 12:38) (97% - 100%)  Wt(kg): --    Weight (kg): 55.2 (01-17-24 @ 18:38)      01-18-24 @ 07:01  -  01-19-24 @ 07:00  --------------------------------------------------------  IN: 360 mL / OUT: 0 mL / NET: 360 mL    01-19-24 @ 07:01  -  01-19-24 @ 16:10  --------------------------------------------------------  IN: 400 mL / OUT: 1400 mL / NET: -1000 mL      Physical Exam:  	Gen: NAD  	HEENT: MMM  	Pulm: CTA B/L  	CV: S1S2  	Abd: Soft, +BS  	Ext: No LE edema B/L                      Neuro: Awake   	Skin: Warm and Dry   	Vascular access: NO HD catheter             no yoshi  LABS/STUDIES  --------------------------------------------------------------------------------              9.7    5.31  >-----------<  210      [01-19-24 @ 06:00]              31.1     132  |  94  |  25  ----------------------------<  223      [01-19-24 @ 06:00]  4.1   |  25  |  5.49        Ca     8.5     [01-19-24 @ 06:00]      Mg     2.30     [01-19-24 @ 06:00]      Phos  2.7     [01-19-24 @ 06:00]            Creatinine Trend:  SCr 5.49 [01-19 @ 06:00]  SCr 3.77 [01-18 @ 06:11]  SCr 4.76 [01-17 @ 06:22]  SCr 4.38 [01-17 @ 01:29]  SCr 4.28 [01-16 @ 22:47]    Urinalysis - [01-19-24 @ 06:00]      Color  / Appearance  / SG  / pH       Gluc 223 / Ketone   / Bili  / Urobili        Blood  / Protein  / Leuk Est  / Nitrite       RBC  / WBC  / Hyaline  / Gran  / Sq Epi  / Non Sq Epi  / Bacteria       TSH 3.47      [01-16-24 @ 22:47]    HBsAb <3.0      [01-16-24 @ 06:22]  HBsAg Nonreact      [01-16-24 @ 06:22]  HBcAb Nonreact      [01-16-24 @ 06:22]  HCV 0.09, Nonreact      [01-16-24 @ 06:22]

## 2024-01-19 NOTE — PROGRESS NOTE ADULT - ASSESSMENT
72 y/o male w/ HTN, HLD, T2DM on insulin, ESRD (HD on MWF via R arm AVF. AVF creation surgery in 9/2022, HD since 12/2022)  for a little over a year, h/o poor compliance w meds and frequent admission, now being sent from the dialysis center 2/2 hypertensive urgency and hyperglycemia. Endocrinology consulted for management of T2DM.    Poorly controlled T2DM with hyperglycemia  - HbA1c: 14  - Home Regimen: not currently on medication, 9/22 and changed from novolin N 8/7 to tresiba 14 units daily and lyumjev 3-11 units sliding scale with meals, had novolog 70/30 filled 4/23. Stopped taking Insulin months ago.   - Endocrinologist: Dr. Tony  PLAN  - Goal -180:  in target range on correction scale only  - Hold lantus for now   - Hold standing Admelog for now ; if pre-prandial glucose 180 x 2 please consider starting Admelog 2 units TID AC (please hold if npo/not eating)  - Continue low dose Admelog correction scale pre-meal and low dose Admelog correction scale at bedtime  - Fingerstick BG before meals and bedtime  - hypoglycemia protocol prn  - Advised team to change to consistent carb diet   - RD consult  Discharge plan:  - Discharge medications initiall dc plan was basal/bolus; Suspect diet at home is much heavier in carbs than in hospital  Pt not requiring standing insulin while inp. As per wife pt eats high carbs at home.   dc plan basal insulin - on tresiba at home- and Lyumjev  Therefore please send tresiba  6 units sq qhs and   if glucose above 180 preprandial at home may restart on Lyumjev kwikpen pen 2 units TID AC (please hold if npo/not eating)  - For any further titration would defer to outpt provider   - Pt states he is motivated to make dietary changes but unclear if he would adhere as per wife  - Please call your doctor if your fs is 70 or below and or 250 and above   - Reviewed importance of medication adherence, following a consistent carb diet, and glucose monitoring  - Hypoglycemia and intervention reviewed   - For severe hypoglycemia: Please prescribe Gvoke HypoPen One Pack 1mG/0.2mL subcutaneous solution: 1 mG subcutaneously as needed for severe hypoglycemia or Baqsimi One Pack 3mG nasal powder: 3 mg (one actuation) into a single nostril as needed for severe hypoglycemia. If not covered, please prescribe Glucagon Emergency Kit for Low Blood Sugar 1 mG injection: 1 mG intramuscularly as needed for severe hypoglycemia. Glucose tablets 4G (take 4 tablets) or 15G tablets for blood sugar less than 70 mG/dL repeat fingerstick in 15 minutes. Patient and family will need instructions on proper use and to call 911 after use.   - Ensure patient has working glucometer, test strips, lancets, alcohol pads, and BD brittni pen needles  - Please also prescribe glucose tabs, Baqsimi nasal spray or glucagon emergency kit for hypoglycemia risk   - Recommend routine outpatient ophthalmology, podiatry and endocrinology f/u  - Wife Pauline states she will supervise pt administering insulin and with glucose monitoring. Both pt and wife know how to administer insulin via pen. Please review prior to discharge   - Please follow up with [facility] with Dr. Murphy.  [address] [place] [id number]; [phone]: please call to make an appointment     HTN  - Home regimen: coreg 25mg bid  PLAN  - Can check urine microalbumin outpatient  - Outpatient goal BP <130/80. Management per primary team.    HLD  - Home regimen: atorvastatin 20mg daily  PLAN  - Continue atorvastatin 20mg daily per primary team  - Can check lipid profile if not done recently    Metabolic Acidosis   anion gap 20 co2 24   most likely renal related; glucose 102 on serum ; low risk for DKA  trend anion gap   defer to renal and primary team     Shavon Stevens  Nurse Practitioner  Division of Endocrinology & Diabetes  Available via  Teams      If after 6PM or before 9AM, or on weekends/holidays, please call endocrine answering service for assistance ([phone]).  For nonurgent matters email [email or web address] for assistance.

## 2024-01-19 NOTE — PROGRESS NOTE ADULT - PROVIDER SPECIALTY LIST ADULT
Cardiology
Cardiology
Endocrinology
Nephrology
Cardiology
Internal Medicine
Nephrology
Endocrinology
Endocrinology

## 2024-01-19 NOTE — PROGRESS NOTE ADULT - ASSESSMENT
74 y/o male w/ HTN, HLD, T2DM on insulin, ESRD (HD on MWF via R arm AVF. AVF creation surgery in 9/2022, HD since 12/2022)  for a little over a year, h/o poor compliance w meds and frequent admission, now being sent from the dialysis center 2/2 hypertensive urgency and hyperglycemia    Ptn is asymptomatic. FS in the ED close to 500. BP at the dialysis center: 200/80  Denies fevers, chills, nausea, vomiting, dizziness, chest pain, SOB, abdominal pain, dysuria, hematuria.     Hypertensive Urgency  ESRD on HD  Anemia, chronic  DKA    -HD as per renal  - anemia  -BP has stabilized,   - DKA resolved, no further hypoglycemic episodes, cleared by endo for DC. insulin dosages sent to pharmacy   - hypoglycemia resolved but now ptn is again hyperglycemic, endo following   - hopefully post DC ptn will become compliant w meds  - dc home today  dvt ppx wHSC

## 2024-01-19 NOTE — PROGRESS NOTE ADULT - SUBJECTIVE AND OBJECTIVE BOX
Patient is a 73y old  Male who presents with a chief complaint of Primary hypertension     (18 Jan 2024 10:35)      SUBJECTIVE / OVERNIGHT EVENTS: fs and bp stable    MEDICATIONS  (STANDING):  atorvastatin 20 milliGRAM(s) Oral at bedtime  carvedilol 12.5 milliGRAM(s) Oral every 12 hours  chlorhexidine 4% Liquid 1 Application(s) Topical daily  dextrose 5%. 1000 milliLiter(s) (100 mL/Hr) IV Continuous <Continuous>  dextrose 5%. 1000 milliLiter(s) (50 mL/Hr) IV Continuous <Continuous>  dextrose 50% Injectable 12.5 Gram(s) IV Push once  dextrose 50% Injectable 25 Gram(s) IV Push once  dextrose 50% Injectable 25 Gram(s) IV Push once  glucagon  Injectable 1 milliGRAM(s) IntraMuscular once  heparin   Injectable 5000 Unit(s) SubCutaneous every 12 hours  hydrALAZINE 100 milliGRAM(s) Oral three times a day  insulin lispro (ADMELOG) corrective regimen sliding scale   SubCutaneous at bedtime  insulin lispro (ADMELOG) corrective regimen sliding scale   SubCutaneous three times a day before meals  Nephro-noam 1 Tablet(s) Oral daily  NIFEdipine XL 60 milliGRAM(s) Oral two times a day    MEDICATIONS  (PRN):  calamine/zinc oxide Lotion 1 Application(s) Topical three times a day PRN Itching  dextrose Oral Gel 15 Gram(s) Oral once PRN Blood Glucose LESS THAN 70 milliGRAM(s)/deciliter  sodium chloride 0.9% Bolus. 100 milliLiter(s) IV Bolus every 5 minutes PRN SBP LESS THAN or EQUAL to 90 mmHg      Vital Signs Last 24 Hrs  T(F): 98 (01-19-24 @ 18:00), Max: 98.9 (01-19-24 @ 09:44)  HR: 62 (01-19-24 @ 18:00) (55 - 63)  BP: 144/60 (01-19-24 @ 18:00) (124/74 - 150/67)  RR: 16 (01-19-24 @ 18:00) (16 - 17)  SpO2: 97% (01-19-24 @ 18:00) (97% - 100%)  Telemetry:   CAPILLARY BLOOD GLUCOSE      POCT Blood Glucose.: 317 mg/dL (19 Jan 2024 17:51)  POCT Blood Glucose.: 162 mg/dL (19 Jan 2024 12:14)  POCT Blood Glucose.: 123 mg/dL (19 Jan 2024 09:00)  POCT Blood Glucose.: 244 mg/dL (19 Jan 2024 04:24)    I&O's Summary    18 Jan 2024 07:01  -  19 Jan 2024 07:00  --------------------------------------------------------  IN: 360 mL / OUT: 0 mL / NET: 360 mL    19 Jan 2024 07:01  -  19 Jan 2024 21:43  --------------------------------------------------------  IN: 400 mL / OUT: 1400 mL / NET: -1000 mL        PHYSICAL EXAM:  GENERAL: NAD, well-developed  HEAD:  Atraumatic, Normocephalic  EYES: EOMI, PERRLA, conjunctiva and sclera clear  NECK: Supple, No JVD  CHEST/LUNG: Clear to auscultation bilaterally; No wheeze  HEART: Regular rate and rhythm; No murmurs, rubs, or gallops  ABDOMEN: Soft, Nontender, Nondistended; Bowel sounds present  EXTREMITIES:  2+ Peripheral Pulses, No clubbing, cyanosis, or edema  PSYCH: AAOx3  NEUROLOGY: non-focal  SKIN: No rashes or lesions    LABS:                        9.7    5.31  )-----------( 210      ( 19 Jan 2024 06:00 )             31.1     01-19    132<L>  |  94<L>  |  25<H>  ----------------------------<  223<H>  4.1   |  25  |  5.49<H>    Ca    8.5      19 Jan 2024 06:00  Phos  2.7     01-19  Mg     2.30     01-19            Urinalysis Basic - ( 19 Jan 2024 06:00 )    Color: x / Appearance: x / SG: x / pH: x  Gluc: 223 mg/dL / Ketone: x  / Bili: x / Urobili: x   Blood: x / Protein: x / Nitrite: x   Leuk Esterase: x / RBC: x / WBC x   Sq Epi: x / Non Sq Epi: x / Bacteria: x        RADIOLOGY & ADDITIONAL TESTS:    Imaging Personally Reviewed:    Consultant(s) Notes Reviewed:      Care Discussed with Consultants/Other Providers:

## 2024-01-19 NOTE — DISCHARGE NOTE NURSING/CASE MANAGEMENT/SOCIAL WORK - NSDCPEFALRISK_GEN_ALL_CORE
For information on Fall & Injury Prevention, visit: [email or web address] OR  [email or web address] OR  https://www.cdc.gov/steadi/patient.html

## 2024-01-19 NOTE — DISCHARGE NOTE NURSING/CASE MANAGEMENT/SOCIAL WORK - PATIENT PORTAL LINK FT
You can access the FollowMyHealth Patient Portal offered by [facility] by registering at the following website: [email or web address]. By joining [facility]’s FollowMyHealth portal, you will also be able to view your health information using other applications (apps) compatible with our system.

## 2024-01-19 NOTE — PROGRESS NOTE ADULT - SUBJECTIVE AND OBJECTIVE BOX
Chief Complaint: Type 2 DM     History: Pt seen at bedside. Pt tolerating oral diet. Pt denies nausea and vomiting/any signs of hypoglycemia. Pt reports an adequate appetite.       MEDICATIONS  (STANDING):  atorvastatin 20 milliGRAM(s) Oral at bedtime  carvedilol 12.5 milliGRAM(s) Oral every 12 hours  chlorhexidine 4% Liquid 1 Application(s) Topical daily  dextrose 5% + sodium chloride 0.9%. 1000 milliLiter(s) (40 mL/Hr) IV Continuous <Continuous>  dextrose 5%. 1000 milliLiter(s) (50 mL/Hr) IV Continuous <Continuous>  dextrose 5%. 1000 milliLiter(s) (100 mL/Hr) IV Continuous <Continuous>  dextrose 50% Injectable 25 Gram(s) IV Push once  dextrose 50% Injectable 12.5 Gram(s) IV Push once  dextrose 50% Injectable 25 Gram(s) IV Push once  glucagon  Injectable 1 milliGRAM(s) IntraMuscular once  heparin   Injectable 5000 Unit(s) SubCutaneous every 12 hours  hydrALAZINE 100 milliGRAM(s) Oral three times a day  insulin lispro (ADMELOG) corrective regimen sliding scale   SubCutaneous three times a day before meals  insulin lispro (ADMELOG) corrective regimen sliding scale   SubCutaneous at bedtime  NIFEdipine XL 60 milliGRAM(s) Oral two times a day    MEDICATIONS  (PRN):  dextrose Oral Gel 15 Gram(s) Oral once PRN Blood Glucose LESS THAN 70 milliGRAM(s)/deciliter  sodium chloride 0.9% Bolus. 100 milliLiter(s) IV Bolus every 5 minutes PRN SBP LESS THAN or EQUAL to 90 mmHg      Allergies: No Known Allergies    Review of Systems:  Respiratory: No SOB, no cough  GI: No nausea, vomiting, abdominal pain  Endocrine: no polyuria, polydipsia    Vital Signs Last 24 Hrs  T(C): 36.7 (19 Jan 2024 12:38), Max: 37.2 (19 Jan 2024 09:44)  T(F): 98.1 (19 Jan 2024 12:38), Max: 98.9 (19 Jan 2024 09:44)  HR: 63 (19 Jan 2024 12:38) (55 - 63)  BP: 136/48 (19 Jan 2024 12:38) (124/74 - 150/67)  BP(mean): --  RR: 17 (19 Jan 2024 12:38) (16 - 18)  SpO2: 97% (19 Jan 2024 12:38) (97% - 100%)    Parameters below as of 19 Jan 2024 12:38  Patient On (Oxygen Delivery Method): room air      GENERAL: NAD, well-groomed, well-developed  RESPIRATORY: No labored breathing   GI: Soft, nontender, non distended  PSYCH: Alert and oriented x 3, normal affect, normal mood      CAPILLARY BLOOD GLUCOSE    POCT Blood Glucose.: 162 mg/dL (19 Jan 2024 12:14)  POCT Blood Glucose.: 123 mg/dL (19 Jan 2024 09:00)  POCT Blood Glucose.: 244 mg/dL (19 Jan 2024 04:24)  POCT Blood Glucose.: 345 mg/dL (18 Jan 2024 21:16)  POCT Blood Glucose.: 215 mg/dL (18 Jan 2024 17:59)  POCT Blood Glucose.: 251 mg/dL (18 Jan 2024 14:20)  POCT Blood Glucose.: 197 mg/dL (18 Jan 2024 13:05)  POCT Blood Glucose.: 95 mg/dL (18 Jan 2024 09:08)  POCT Blood Glucose.: 129 mg/dL (17 Jan 2024 22:04)  POCT Blood Glucose.: 148 mg/dL (17 Jan 2024 18:27)    A1C with Estimated Average Glucose (01.15.24 @ 13:36)    A1C with Estimated Average Glucose Result: 14.0   Estimated Average Glucose: 355      01-19    132<L>  |  94<L>  |  25<H>  ----------------------------<  223<H>  4.1   |  25  |  5.49<H>    Ca    8.5      19 Jan 2024 06:00  Phos  2.7     01-19  Mg     2.30     01-19        Thyroid Function Tests:  01-16 @ 22:47 TSH 3.47 FreeT4 -- T3 93 Anti TPO -- Anti Thyroglobulin Ab -- TSI --

## 2024-01-19 NOTE — PROGRESS NOTE ADULT - SUBJECTIVE AND OBJECTIVE BOX
Cardiovascular Disease Progress Note  Date of Service: 24 @ 10:00    Overnight events: No acute events overnight.   The patient is undergoing HD in no distress.    Otherwise review of systems negative    Objective Findings:  T(C): 37.2 (24 @ 09:44), Max: 37.2 (24 @ 09:44)  HR: 57 (24 @ 09:44) (55 - 63)  BP: 150/67 (24 @ 09:44) (124/74 - 150/67)  RR: 16 (24 @ 09:44) (16 - 18)  SpO2: 100% (24 @ 22:08) (98% - 100%)  Wt(kg): --  Daily     Daily Weight in k.8 (2024 09:44)      Physical Exam:  Gen: NAD; Patient resting comfortably  HEENT: EOMI, Normocephalic/ atraumatic  CV: RRR, normal S1 + S2, no m/r/g  Lungs:  Normal respiratory effort; clear to auscultation bilaterally  Abd: soft, non-tender; bowel sounds present  Ext: No edema; warm and well perfused    Telemetry: Sinus    Laboratory Data:                        9.7    5.31  )-----------( 210      ( 2024 06:00 )             31.1         132<L>  |  94<L>  |  25<H>  ----------------------------<  223<H>  4.1   |  25  |  5.49<H>    Ca    8.5      2024 06:00  Phos  2.7       Mg     2.30                     Inpatient Medications:  MEDICATIONS  (STANDING):  atorvastatin 20 milliGRAM(s) Oral at bedtime  carvedilol 12.5 milliGRAM(s) Oral every 12 hours  chlorhexidine 4% Liquid 1 Application(s) Topical daily  dextrose 5%. 1000 milliLiter(s) (100 mL/Hr) IV Continuous <Continuous>  dextrose 5%. 1000 milliLiter(s) (50 mL/Hr) IV Continuous <Continuous>  dextrose 50% Injectable 12.5 Gram(s) IV Push once  dextrose 50% Injectable 25 Gram(s) IV Push once  dextrose 50% Injectable 25 Gram(s) IV Push once  glucagon  Injectable 1 milliGRAM(s) IntraMuscular once  heparin   Injectable 5000 Unit(s) SubCutaneous every 12 hours  hydrALAZINE 100 milliGRAM(s) Oral three times a day  insulin lispro (ADMELOG) corrective regimen sliding scale   SubCutaneous at bedtime  insulin lispro (ADMELOG) corrective regimen sliding scale   SubCutaneous three times a day before meals  Nephro-noam 1 Tablet(s) Oral daily  NIFEdipine XL 60 milliGRAM(s) Oral two times a day      Assessment: 73 year old man with HTN, HLD, T2DM on insulin, and ESRD on HD presents with supply demand ischemia and HTN urgency.     Plan of Care:    #Supply demand ischemia-  Secondary to HTN urgency.  BP is now much improved post HD.   No acute findings on echocardiogram.   No plan for ischemic work up at this time given normal wall motion on echo, medication nonadherence, and the absence of chest pain.     #Sinus bradycardia-  No evidence of AV block on admission EKG or telemetry.  No indication for pacing at this time.     #HTN urgency-  BP now improved post HD.    #ESRD-  HD as per renal.     #ACP (advance care planning)-  Advanced care planning was discussed with the patient.  Advance care planning forms were discussed. Risks, benefits and alternatives of medical treatment and procedures were discussed in detail and all questions were answered. 30 additional minutes spent addressing advance care plans.      Over 55 minutes spent on total encounter; more than 50% of the visit was spent counseling and/or coordinating care by the attending physician.      Geovani Bravo MD [facility]  Cardiovascular Disease  [phone]

## 2024-01-19 NOTE — PROGRESS NOTE ADULT - PROBLEM SELECTOR PROBLEM 1
Poorly controlled type 2 diabetes mellitus

## 2024-01-22 ENCOUNTER — NON-APPOINTMENT (OUTPATIENT)
Age: 74
End: 2024-01-22

## 2024-01-22 LAB
CULTURE RESULTS: SIGNIFICANT CHANGE UP
SPECIMEN SOURCE: SIGNIFICANT CHANGE UP

## 2024-01-22 RX ORDER — FUROSEMIDE 40 MG/1
40 TABLET ORAL TWICE DAILY
Qty: 180 | Refills: 0 | Status: DISCONTINUED | COMMUNITY
Start: 2022-06-15 | End: 2024-01-22

## 2024-01-22 RX ORDER — BENZONATATE 100 MG/1
100 CAPSULE ORAL 3 TIMES DAILY
Qty: 30 | Refills: 0 | Status: DISCONTINUED | COMMUNITY
Start: 2023-11-16 | End: 2024-01-22

## 2024-01-23 PROBLEM — N18.6 END STAGE RENAL DISEASE: Chronic | Status: ACTIVE | Noted: 2024-01-15

## 2024-01-24 LAB
CORTICOSTEROID BINDING GLOBULIN RESULT: 2.2 MG/DL — SIGNIFICANT CHANGE UP
CORTIS F/TOTAL MFR SERPL: 49 % — SIGNIFICANT CHANGE UP
CORTIS SERPL-MCNC: 29 UG/DL — HIGH
CORTISOL, FREE RESULT: 14 UG/DL — HIGH

## 2024-01-25 ENCOUNTER — APPOINTMENT (OUTPATIENT)
Dept: ENDOCRINOLOGY | Facility: CLINIC | Age: 74
End: 2024-01-25
Payer: MEDICARE

## 2024-01-25 VITALS
RESPIRATION RATE: 16 BRPM | HEIGHT: 68 IN | TEMPERATURE: 98 F | WEIGHT: 116 LBS | HEART RATE: 78 BPM | DIASTOLIC BLOOD PRESSURE: 70 MMHG | SYSTOLIC BLOOD PRESSURE: 116 MMHG | OXYGEN SATURATION: 99 % | BODY MASS INDEX: 17.58 KG/M2

## 2024-01-25 LAB — GLUCOSE BLDC GLUCOMTR-MCNC: 226

## 2024-01-25 PROCEDURE — 82962 GLUCOSE BLOOD TEST: CPT

## 2024-01-25 PROCEDURE — 99496 TRANSJ CARE MGMT HIGH F2F 7D: CPT | Mod: 25

## 2024-01-25 PROCEDURE — 99214 OFFICE O/P EST MOD 30 MIN: CPT | Mod: 25

## 2024-01-25 RX ORDER — GLUCAGON INJECTION, SOLUTION 1 MG/.2ML
1 INJECTION, SOLUTION SUBCUTANEOUS
Qty: 1 | Refills: 3 | Status: ACTIVE | COMMUNITY
Start: 2024-01-25 | End: 1900-01-01

## 2024-02-05 NOTE — ED PROVIDER NOTE - NS ED MD DISPO ADMITTING SERVICE
Behavioral Health  Yoselin Coleman PsyD.  Psychologist    Start time: 12:43 PM  Stop time: 1:23 PM  Time spent directly with patient/family/caregiver: 40 minutes     Reason for Appointment:  anxiety and selective mutism  Pediatrician/PCP/referring provider: Juan Manuel Walker MD   Accompanied by: Mother (Janet)     Pt has asked for his notes to be locked as he does not want to information shared in the appointment to be accessible. He did not want me to share any information discussed during the appt with mom.     S:  Pt reports that he is doing well. He does report continued trouble falling asleep at night. He feels that being on his phone helps him to sleep. He discussed wanting to return to school in person and reports that he has a meeting upcoming to discuss with the school. It was unclear in talking to the pt if he prefers to return now or next school year. Mom reports that they have an IEP meeting upcoming and plans to discuss him returning to school. Pt reports that she would like to identify who his  is and go to sleep earlier and then feels her will be ready to return. Mom would also like the pt to meet with the  a few times before returning.     O:  MSE:  Appearance    well groomed  Behavior: fidgeting  Speech   regular rate and rhythm   Mood   neutral   Affect   neutral   Thought Content    no delusions, no homicidal ideations, no hallucinations, and no suicidal ideations  Thought Process    goal directed, associations intact, sequential  Insight    age appropriate  Judgment    age appropriate  Orientation    oriented to person, place, time, and general circumstances  Memory   intact  Attention/Concentration    intact  Morbid ideation No  Suicide Assessment    none    History:    Medications:   No current outpatient medications on file.     No current facility-administered medications for this visit.   :    Social History: The patient played soccer summer 2022,  which he described to be good but his mother did note that he was quiet. They report that he would like to play football and basketball. Pt reports that his BFF is Kat who he reports he talks to on the phone and sees outside of school.     Family History: Patient lives with his parents, 2 older sisters, older brother, and 2 dogs, and bearded dragon (Manuel).  Pt siblings are home schooled.     Educational history: Patient is currently in 6th grade and is doing virtual school through [employer] program. His mother reports that he has an IEP for selective mutism. His mother reports that when the pt was in school he talked to his friends at recess and then was quiet when he came back into the building. She reports that he answered his teacher in a quiet tone or nonverbally. His mother reports that the patient started having difficulty going to school ~11/23 in 5th grade. She reports that he was was reporting stomachaches, headaches, and that his body felt weird. She reports that they made a plan for him to meet his  (Ms. Iniguez) and walk in with her, which they feel helped but Ms. Iniguez was not returning to school for his 6th grade year.     Stressors: Patient's mother reports that in 2020 they sold their house, moved into his MGP's, and then the COVID-19 pandemic occurred. She reports that the end of second grade and all of 3rd grade was virtual for him and transitioning back in person in fourth grade was a struggle. She reports that his MGP's have since moved to [place].      Pertinent symptom history:  Anxiety: Patient was described to be anxious in social situations and in new situations. His mother reports that he is also self-conscious about his height.     A:  Administered the PHQ-A, the patient's responses indicate minimal symptoms associated with depression. The only item endorsed are associated with sleep. Pt and his mother are reporting improvement in symptoms of  selective mutism, however, there are still symptoms that are persisting. Patient may benefit from continued  services to learn new coping skills and to help with symptom management/reduction.    PHQ-A score = 2  Interpretation of Total Score Depression Severity: 1-4 = Minimal depression, 5-9 = Mild depression, 10-14 = Moderate depression, 15-19 = Moderately severe depression, 20-27 = Severe depression    Diagnosis:  Selective mutism   R/O social anxiety disorder (pt denies worries about being judged/embarrassed in social situations but also reports that he would just refuse to be in certain situations)    Plan:  Pt interventions:  [place] setting to identify the pt's primary goals for visit and provided, practiced alternative thoughts for thinking errors (labeling), practiced conversation and exposure exercise for talking (would I rather game), and discussed strategies/accommodations for school    Treatment Goals:    Feel more comfortable communicating/talking to others; reduce physical symptoms of anxiety and negative/anxious thoughts, increase comfort social interactions and involvement in extracurricular activities    In this goal, Carlos demonstrated progress.    Pt Behavioral Change Plan:  1. Continue practicing relaxation/coping strategies 5-10 minutes a day.  2.Recommended using night mode if he is going to be on his phone before bed. If the goal is to return to school this school year I recommended he start working on getting his sleep schedule back on track now.   3. F/U in 1-2 wk F/U (pt preference)    In the next treatment session, we will focus on thematic material raised in this session as appropriate.    Please note this report has been partially produced using speech recognition software  And may cause contain errors related to that system including grammar, punctuation and spelling as well as words and phrases that may seem inappropriate. If there are questions or concerns please feel free to contact  me to clarify.    MED

## 2024-02-22 ENCOUNTER — APPOINTMENT (OUTPATIENT)
Dept: INTERNAL MEDICINE | Facility: CLINIC | Age: 74
End: 2024-02-22
Payer: MEDICARE

## 2024-02-22 VITALS
OXYGEN SATURATION: 96 % | BODY MASS INDEX: 17.13 KG/M2 | HEIGHT: 68 IN | HEART RATE: 80 BPM | SYSTOLIC BLOOD PRESSURE: 129 MMHG | DIASTOLIC BLOOD PRESSURE: 75 MMHG | WEIGHT: 113 LBS

## 2024-02-22 DIAGNOSIS — R41.3 OTHER AMNESIA: ICD-10-CM

## 2024-02-22 DIAGNOSIS — N18.5 CHRONIC KIDNEY DISEASE, STAGE 5: ICD-10-CM

## 2024-02-22 DIAGNOSIS — I10 ESSENTIAL (PRIMARY) HYPERTENSION: ICD-10-CM

## 2024-02-22 DIAGNOSIS — R53.83 OTHER FATIGUE: ICD-10-CM

## 2024-02-22 PROCEDURE — 99214 OFFICE O/P EST MOD 30 MIN: CPT

## 2024-02-22 NOTE — HISTORY OF PRESENT ILLNESS
[Spouse] : spouse [FreeTextEntry1] : F/u exam [de-identified] : Mr. JIMMY BLAND 73 year male with a PMH uncontrolled DM2, hyperlipidemia, vit D deficiency, ESRD on HD 3 times per week(M-W-F) came for a f/u. Patient seeing a nephrologist, cardiologist. Recently has thrombectomy from a AVF on 10/03/23 Patient was seen by a cardiologist, Dr. Kaiser Serrato from [facility] Recently was admitted to an [facility] on 01/15/24-01/19/24 due to an uncontrolled DM2 and HTN.As per patient's wife her  was not getting his insulin injection for the past 7-8 mo. Was seen by an endocrinologist on 01/25/24, resume his meds for DM2, recommend to f/u with NP in 6 wks. Has a f/u appointment on 03/05/23. As per patient he feels OK, denies complaints at present time, except weight loss and feeling weak after HD. Patient's wife suma blood test result from a HD from 02/14/24 - cbc, anemia - 10.7, HbA1c is 13.2

## 2024-02-22 NOTE — PLAN
[FreeTextEntry1] : Mr. JIMYM BLAND 72 year male with a PMH uncontrolled DM2, hyperlipidemia, vit D deficiency, CRI , anemia present to the office to f/u F/u  exam is performed. Recommend  to do a blood test (will do another time) further management will depend on the blood test results.   HTN is controlled, recommend  to continue coreg, hydralazine, nifedipine. Risk of getting stroke is explained to the patient DM2 recommend  to bring a log book. Recommend to continue insulin injection novolog, tresiba. Close monitor BG  F/u with endo, podiatrist, ophtalmologist ESRD on HD, continue present meds, renal diet. F/u with nephrologisy For decrease memory  continue , f/u with neurologist(donepezil was d/c) Anemia - continue procrit. Had a colonoscopy on 06/20/2023 - IH, diverticulosis, recommend  to repeat colonoscopy in 10 years  RTC to f/u in 2 wks after a blood test. Patient is verbalized understanding

## 2024-02-22 NOTE — REVIEW OF SYSTEMS
[Headache] : no headache [Dizziness] : no dizziness [Confusion] : no confusion [Unsteady Walk] : no ataxia [Negative] : Heme/Lymph [FreeTextEntry2] : c/o ce [de-identified] : C/o decrease memory

## 2024-03-05 ENCOUNTER — APPOINTMENT (OUTPATIENT)
Dept: ENDOCRINOLOGY | Facility: CLINIC | Age: 74
End: 2024-03-05
Payer: MEDICARE

## 2024-03-05 VITALS
HEART RATE: 43 BPM | TEMPERATURE: 98 F | RESPIRATION RATE: 16 BRPM | BODY MASS INDEX: 17.43 KG/M2 | DIASTOLIC BLOOD PRESSURE: 74 MMHG | HEIGHT: 68 IN | WEIGHT: 115 LBS | SYSTOLIC BLOOD PRESSURE: 158 MMHG | OXYGEN SATURATION: 98 %

## 2024-03-05 DIAGNOSIS — E87.5 HYPERKALEMIA: ICD-10-CM

## 2024-03-05 DIAGNOSIS — E55.9 VITAMIN D DEFICIENCY, UNSPECIFIED: ICD-10-CM

## 2024-03-05 DIAGNOSIS — D63.1 CHRONIC KIDNEY DISEASE, STAGE 5: ICD-10-CM

## 2024-03-05 DIAGNOSIS — N18.5 CHRONIC KIDNEY DISEASE, STAGE 5: ICD-10-CM

## 2024-03-05 DIAGNOSIS — E78.5 HYPERLIPIDEMIA, UNSPECIFIED: ICD-10-CM

## 2024-03-05 DIAGNOSIS — E11.65 TYPE 2 DIABETES MELLITUS WITH HYPERGLYCEMIA: ICD-10-CM

## 2024-03-05 PROCEDURE — 82962 GLUCOSE BLOOD TEST: CPT

## 2024-03-05 PROCEDURE — 99214 OFFICE O/P EST MOD 30 MIN: CPT | Mod: 25

## 2024-03-05 RX ORDER — HYDRALAZINE HYDROCHLORIDE 100 MG/1
100 TABLET ORAL 3 TIMES DAILY
Qty: 270 | Refills: 3 | Status: ACTIVE | COMMUNITY
Start: 2022-08-19 | End: 1900-01-01

## 2024-03-05 RX ORDER — BLOOD SUGAR DIAGNOSTIC
STRIP MISCELLANEOUS
Qty: 400 | Refills: 4 | Status: ACTIVE | COMMUNITY
Start: 2024-03-05 | End: 1900-01-01

## 2024-03-05 RX ORDER — CARVEDILOL 25 MG/1
25 TABLET, FILM COATED ORAL TWICE DAILY
Qty: 180 | Refills: 3 | Status: ACTIVE | COMMUNITY
Start: 2022-09-19 | End: 1900-01-01

## 2024-03-06 NOTE — ASSESSMENT
[FreeTextEntry1] : T2DM, Apparent improved compliance with wife at helm.   - [facility] no SGLT2 given low GFR. F/u with renal! - Increase Tresiba 10 u qhs, I educated Ms Art that Tresiba 10 u is a standing dose and can be given every night.  If BG is below 90 mg/dL Pt can have a small snack but should still take Tresiba as prescribed.   - Increase Humalog ac B (3-4-5-6-1-1-9-10-11),  L,D (2-4-8-6-1-5-10-11-11) - hypo's precautions - Glucagon kit - continue lipitor 20mg HS, cozaar 50mg, norvasc 10 mg but monitor for feet swelling - follow up plasma K+ results. low K+ diet. might have to stop cozaar - f/u with renal - blood work today -Total iron w/ binding capacity as ordered by Dr. Omer  RTC 3 months

## 2024-03-06 NOTE — HISTORY OF PRESENT ILLNESS
[FreeTextEntry1] : F/u for diabetes management ***Mar. 5, 2024*** feels well overall denies complaints, dialysis MWF Profound improvement : Wife has taken control of Diet, medication administration and BG monitoring/documentation. He is presently on Tresiba 10 u qhs (however Pt's wife reports that she uses HISS to titrate Tresiba daily), HISS TID premeal B,L (2-3-4-1-4-3-8-9-10) D (3-4-5-0-7-2-9-10-11) Brought logbook: FBG usually 120-140 mg/dL occasional 180, PPG taken in the evening 200-300 mg/dL Reported daily routine: wakes up around 7:30a breakfast at 8a oatmeal with berries, 2 eggs tea or not, maybe a slice of toast, Lunch is at 12-2p rice or quinoa, with baked fish and vegetables and salad, Dinner is toast or hot cereal, may have hot tea and crackers at night for snack.  Phipps goat and roti twice monthly.    HISTORY PER DR. GOOD: *** Jan 25, 2024 *** last visit in 2022 started on HD in 12/22 admitted last week to [facility] for uncontrolled hyperglycemia and HTN. Discharged last Friday. apparently , stopped taking insulin several months ago (as per wife "he did not want to") d/c paper reviewed a1c- 14.0, normal tft's d/c on Tresiba 6 un HS, Lyumjev 2 un tid ac meals, hydralazine 100 mg tid, Nifedipine er 60 mg, Coreg 25 mg bid, Lipitor 20 mg HS log: fbs- 160-261, p/dinner- 197-308  *** May 26, 2022 ***  lost insurance again, was not taking most of his meds for some time resumed Novolog 70/30 8 un at HS (?), prandin 1 mg tid ac , norvasc 10 mg, losartan 50 mg  not checking his FS   *** Feb 10, 2022 ***  missed f/u appts was in [place]- stopped meds for some time, was admitted for acute renal failure, uncontrolled diabetes no recent labs still taking prandin, cozaar, norvasc, chlorthalidone. Off metformin. 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 takes Relion 9-10,  metformin er 500mg 3 tablets with dinner ,  actos 15 mg, prandin 1-2-2 no more leg swelling reports fbs- 88- upto 130, ppg-  low 100's  *** Sep 14, 2020 ***  resumed Relion 8-7,  metformin er 500mg 3 tablets with dinner,  actos 15 mg,  prandin 1-2-2 had labs done this am for potassium recheck- still pending results states that his sugars are better since changing the regimen  *** Sep 09, 2020 ***  unable to afford any basal insulin takes ReliOn 8 un in am, 7 un in pm,  metformin er 500mg 2 tablets with dinner ,  actos 15 mg,   drisdol 50K qw,  lipitor 20mg HS,  cozaar 100mg, norvasc 5 mg swelling resolved with decreasing actos did not start prandin yet reports fbs- 115's, not checking ppg stopped insulin few days ago for unclear reason. today ppg in the office- 317  *** Jun 12, 2020 ***  taking Basaglar 14 units HS, metformin er 500mg 2 tablets with dinner ,  amaryl 4mg qd , actos 30mg qd, drisdol 50K qw feels much better. noticed some ankle swelling tolerates metformin very well reports fbs in 80's- low 90's, not checking ppg  Date of download:  6/12/20 Diabetes Medications and Dosage: as above Indication for CGMS: verify a change in the treatment regimen, identify periods of hypoglycemia/ hyperglycemia.  Modal day report: pattern.  Pt with HYPO 4 % of the time (5% below 54),  52% in target range Hyperglycemia:  39% elevation  Identified issues: carbohydrate counting, spiking post lunch and dinner dates analyzed: 5/29/20-6/12/20   *** May 29, 2020 ***  ran out of Geneix about a month ago (not covered)  and his blood pressure medications diarrhea on metformin 1g bid taking actos 30 mg qd states that FS were in 90's when was taking Soliqua, now reports fbs and ppg in 180's- 200's  HISTORY OF PRESENT ILLNESS.   Mr. BLAND was diagnosed with Diabetes Mellitus Type 2 in 2001  Reports history HTN, dyslipidemia, proteinuria/nephropathy.  He denies CAD,  known complications of retinopathy or neuropathy.  Presently on metformin 500 mg tid, amaryl 4mg qd, prandin 1mg tid, losartan 100mg, norvasc 5 mg, lipitor 20mg HS He stopped checking his blood sugars at home.  Hypoglycemia frequency: occasional subjective hypoglycemia Fingerstick glucose in the office today is 196 mg/dL 2 hours after eating.  Diet: not following ADA Exercise:   Lab review: a1c- 10.8 <-- 9.7 <--13.2; creatinine- 1.45 <-- 1.42, 25D- 13.6, macroalbuminuria, TSH- 2.67, LDL- 136  ****  Last dilated eye - 2023, to see in March Last podiatry visit  - 2023 Last cardiology evaluation - 8/19 Last stress test - 8/19 Last 2-D Echo 8/19 Last nephrology evaluation - 2019 Last neurology evaluation- none

## 2024-03-08 LAB — GLUCOSE BLDC GLUCOMTR-MCNC: 115

## 2024-03-19 ENCOUNTER — LABORATORY RESULT (OUTPATIENT)
Age: 74
End: 2024-03-19

## 2024-03-28 ENCOUNTER — APPOINTMENT (OUTPATIENT)
Dept: OPHTHALMOLOGY | Facility: CLINIC | Age: 74
End: 2024-03-28
Payer: MEDICARE

## 2024-03-28 ENCOUNTER — NON-APPOINTMENT (OUTPATIENT)
Age: 74
End: 2024-03-28

## 2024-03-28 PROCEDURE — 92201 OPSCPY EXTND RTA DRAW UNI/BI: CPT

## 2024-03-28 PROCEDURE — 92004 COMPRE OPH EXAM NEW PT 1/>: CPT

## 2024-03-28 PROCEDURE — 92025 CPTRIZED CORNEAL TOPOGRAPHY: CPT

## 2024-04-02 LAB
25(OH)D3 SERPL-MCNC: 34.6 NG/ML
ALBUMIN SERPL ELPH-MCNC: 4.8 G/DL
ALP BLD-CCNC: 85 U/L
ALT SERPL-CCNC: 12 U/L
ANION GAP SERPL CALC-SCNC: 23 MMOL/L
AST SERPL-CCNC: 14 U/L
BASOPHILS # BLD AUTO: 0.05 K/UL
BASOPHILS NFR BLD AUTO: 1.1 %
BILIRUB SERPL-MCNC: 0.7 MG/DL
BUN SERPL-MCNC: 52 MG/DL
CALCIUM SERPL-MCNC: 8.9 MG/DL
CHLORIDE SERPL-SCNC: 94 MMOL/L
CHOLEST SERPL-MCNC: 123 MG/DL
CO2 SERPL-SCNC: 23 MMOL/L
CREAT SERPL-MCNC: 6.51 MG/DL
CREAT SPEC-SCNC: 99 MG/DL
EGFR: 8 ML/MIN/1.73M2
EOSINOPHIL # BLD AUTO: 0.32 K/UL
EOSINOPHIL NFR BLD AUTO: 6.8 %
ESTIMATED AVERAGE GLUCOSE: 157 MG/DL
FOLATE SERPL-MCNC: 18.9 NG/ML
FRUCTOSAMINE SERPL-MCNC: 499 UMOL/L
GLUCOSE SERPL-MCNC: 63 MG/DL
GLYCOMARK.: 1.6 UG/ML
HBA1C MFR BLD HPLC: 7.1 %
HCT VFR BLD CALC: 45.3 %
HDLC SERPL-MCNC: 54 MG/DL
HGB BLD-MCNC: 13.7 G/DL
IMM GRANULOCYTES NFR BLD AUTO: 0.2 %
IRON SATN MFR SERPL: 20 %
IRON SERPL-MCNC: 49 UG/DL
LDLC SERPL CALC-MCNC: 51 MG/DL
LYMPHOCYTES # BLD AUTO: 0.95 K/UL
LYMPHOCYTES NFR BLD AUTO: 20 %
MAN DIFF?: NORMAL
MCHC RBC-ENTMCNC: 19.4 PG
MCHC RBC-ENTMCNC: 30.2 GM/DL
MCV RBC AUTO: 64.3 FL
MICROALBUMIN 24H UR DL<=1MG/L-MCNC: 277 MG/DL
MICROALBUMIN/CREAT 24H UR-RTO: 2802 MG/G
MONOCYTES # BLD AUTO: 0.48 K/UL
MONOCYTES NFR BLD AUTO: 10.1 %
NEUTROPHILS # BLD AUTO: 2.93 K/UL
NEUTROPHILS NFR BLD AUTO: 61.8 %
NONHDLC SERPL-MCNC: 69 MG/DL
PLATELET # BLD AUTO: NORMAL K/UL
POTASSIUM SERPL-SCNC: 4.8 MMOL/L
PROT SERPL-MCNC: 7.4 G/DL
RBC # BLD: 7.05 M/UL
RBC # FLD: 22.3 %
SODIUM SERPL-SCNC: 140 MMOL/L
T4 FREE SERPL-MCNC: 1.6 NG/DL
TIBC SERPL-MCNC: 253 UG/DL
TRIGL SERPL-MCNC: 96 MG/DL
TSH SERPL-ACNC: 2.33 UIU/ML
UIBC SERPL-MCNC: 203 UG/DL
VIT B12 SERPL-MCNC: 633 PG/ML
WBC # FLD AUTO: 4.74 K/UL

## 2024-04-29 ENCOUNTER — INPATIENT (INPATIENT)
Facility: HOSPITAL | Age: 74
LOS: 45 days | Discharge: ANOTHER HOSPITAL | End: 2024-06-14
Attending: STUDENT IN AN ORGANIZED HEALTH CARE EDUCATION/TRAINING PROGRAM | Admitting: STUDENT IN AN ORGANIZED HEALTH CARE EDUCATION/TRAINING PROGRAM
Payer: MEDICARE

## 2024-04-29 VITALS
HEART RATE: 56 BPM | DIASTOLIC BLOOD PRESSURE: 70 MMHG | SYSTOLIC BLOOD PRESSURE: 149 MMHG | RESPIRATION RATE: 20 BRPM | OXYGEN SATURATION: 98 % | TEMPERATURE: 98 F

## 2024-04-29 DIAGNOSIS — E87.8 OTHER DISORDERS OF ELECTROLYTE AND FLUID BALANCE, NOT ELSEWHERE CLASSIFIED: ICD-10-CM

## 2024-04-29 DIAGNOSIS — I77.0 ARTERIOVENOUS FISTULA, ACQUIRED: Chronic | ICD-10-CM

## 2024-04-29 LAB
ALBUMIN SERPL ELPH-MCNC: 4.8 G/DL — SIGNIFICANT CHANGE UP (ref 3.3–5)
ALP SERPL-CCNC: 92 U/L — SIGNIFICANT CHANGE UP (ref 40–120)
ALT FLD-CCNC: 10 U/L — SIGNIFICANT CHANGE UP (ref 4–41)
ANION GAP SERPL CALC-SCNC: 17 MMOL/L — HIGH (ref 7–14)
ANISOCYTOSIS BLD QL: SIGNIFICANT CHANGE UP
APTT BLD: 32.2 SEC — SIGNIFICANT CHANGE UP (ref 24.5–35.6)
AST SERPL-CCNC: 17 U/L — SIGNIFICANT CHANGE UP (ref 4–40)
BASE EXCESS BLDV CALC-SCNC: 4.4 MMOL/L — HIGH (ref -2–3)
BASOPHILS # BLD AUTO: 0 K/UL — SIGNIFICANT CHANGE UP (ref 0–0.2)
BASOPHILS NFR BLD AUTO: 0 % — SIGNIFICANT CHANGE UP (ref 0–2)
BILIRUB SERPL-MCNC: 0.7 MG/DL — SIGNIFICANT CHANGE UP (ref 0.2–1.2)
BLOOD GAS VENOUS COMPREHENSIVE RESULT: SIGNIFICANT CHANGE UP
BUN SERPL-MCNC: 43 MG/DL — HIGH (ref 7–23)
CALCIUM SERPL-MCNC: 8.6 MG/DL — SIGNIFICANT CHANGE UP (ref 8.4–10.5)
CHLORIDE BLDV-SCNC: 95 MMOL/L — LOW (ref 96–108)
CHLORIDE SERPL-SCNC: 93 MMOL/L — LOW (ref 98–107)
CK MB BLD-MCNC: 3.4 % — HIGH (ref 0–2.5)
CK MB CFR SERPL CALC: 1.7 NG/ML — SIGNIFICANT CHANGE UP
CK SERPL-CCNC: 50 U/L — SIGNIFICANT CHANGE UP (ref 30–200)
CO2 BLDV-SCNC: 32.8 MMOL/L — HIGH (ref 22–26)
CO2 SERPL-SCNC: 25 MMOL/L — SIGNIFICANT CHANGE UP (ref 22–31)
CREAT SERPL-MCNC: 6.28 MG/DL — HIGH (ref 0.5–1.3)
DACRYOCYTES BLD QL SMEAR: SLIGHT — SIGNIFICANT CHANGE UP
EGFR: 9 ML/MIN/1.73M2 — LOW
EOSINOPHIL # BLD AUTO: 0.11 K/UL — SIGNIFICANT CHANGE UP (ref 0–0.5)
EOSINOPHIL NFR BLD AUTO: 0.9 % — SIGNIFICANT CHANGE UP (ref 0–6)
GAS PNL BLDV: 132 MMOL/L — LOW (ref 136–145)
GIANT PLATELETS BLD QL SMEAR: PRESENT — SIGNIFICANT CHANGE UP
GLUCOSE BLDC GLUCOMTR-MCNC: 173 MG/DL — HIGH (ref 70–99)
GLUCOSE BLDC GLUCOMTR-MCNC: 194 MG/DL — HIGH (ref 70–99)
GLUCOSE BLDC GLUCOMTR-MCNC: 66 MG/DL — LOW (ref 70–99)
GLUCOSE BLDC GLUCOMTR-MCNC: 70 MG/DL — SIGNIFICANT CHANGE UP (ref 70–99)
GLUCOSE BLDV-MCNC: 241 MG/DL — HIGH (ref 70–99)
GLUCOSE SERPL-MCNC: 236 MG/DL — HIGH (ref 70–99)
HCO3 BLDV-SCNC: 31 MMOL/L — HIGH (ref 22–29)
HCT VFR BLD CALC: 38.2 % — LOW (ref 39–50)
HCT VFR BLDA CALC: 37 % — LOW (ref 39–51)
HGB BLD CALC-MCNC: 12.2 G/DL — LOW (ref 12.6–17.4)
HGB BLD-MCNC: 12.3 G/DL — LOW (ref 13–17)
IANC: 9.96 K/UL — HIGH (ref 1.8–7.4)
INR BLD: 1.06 RATIO — SIGNIFICANT CHANGE UP (ref 0.85–1.18)
LACTATE BLDV-MCNC: 1.1 MMOL/L — SIGNIFICANT CHANGE UP (ref 0.5–2)
LYMPHOCYTES # BLD AUTO: 0.98 K/UL — LOW (ref 1–3.3)
LYMPHOCYTES # BLD AUTO: 7.9 % — LOW (ref 13–44)
MACROCYTES BLD QL: SLIGHT — SIGNIFICANT CHANGE UP
MAGNESIUM SERPL-MCNC: 2.7 MG/DL — HIGH (ref 1.6–2.6)
MCHC RBC-ENTMCNC: 20.2 PG — LOW (ref 27–34)
MCHC RBC-ENTMCNC: 32.2 GM/DL — SIGNIFICANT CHANGE UP (ref 32–36)
MCV RBC AUTO: 62.7 FL — LOW (ref 80–100)
MICROCYTES BLD QL: SIGNIFICANT CHANGE UP
MONOCYTES # BLD AUTO: 0.76 K/UL — SIGNIFICANT CHANGE UP (ref 0–0.9)
MONOCYTES NFR BLD AUTO: 6.1 % — SIGNIFICANT CHANGE UP (ref 2–14)
NEUTROPHILS # BLD AUTO: 10.54 K/UL — HIGH (ref 1.8–7.4)
NEUTROPHILS NFR BLD AUTO: 85.1 % — HIGH (ref 43–77)
OVALOCYTES BLD QL SMEAR: SLIGHT — SIGNIFICANT CHANGE UP
PCO2 BLDV: 55 MMHG — SIGNIFICANT CHANGE UP (ref 42–55)
PH BLDV: 7.36 — SIGNIFICANT CHANGE UP (ref 7.32–7.43)
PHOSPHATE SERPL-MCNC: 5.3 MG/DL — HIGH (ref 2.5–4.5)
PLAT MORPH BLD: NORMAL — SIGNIFICANT CHANGE UP
PLATELET # BLD AUTO: 97 K/UL — LOW (ref 150–400)
PLATELET COUNT - ESTIMATE: ABNORMAL
PO2 BLDV: 30 MMHG — SIGNIFICANT CHANGE UP (ref 25–45)
POIKILOCYTOSIS BLD QL AUTO: SLIGHT — SIGNIFICANT CHANGE UP
POLYCHROMASIA BLD QL SMEAR: SLIGHT — SIGNIFICANT CHANGE UP
POTASSIUM BLDV-SCNC: 5.1 MMOL/L — SIGNIFICANT CHANGE UP (ref 3.5–5.1)
POTASSIUM SERPL-MCNC: 5.2 MMOL/L — SIGNIFICANT CHANGE UP (ref 3.5–5.3)
POTASSIUM SERPL-SCNC: 5.2 MMOL/L — SIGNIFICANT CHANGE UP (ref 3.5–5.3)
PROT SERPL-MCNC: 7.8 G/DL — SIGNIFICANT CHANGE UP (ref 6–8.3)
PROTHROM AB SERPL-ACNC: 11.9 SEC — SIGNIFICANT CHANGE UP (ref 9.5–13)
RBC # BLD: 6.09 M/UL — HIGH (ref 4.2–5.8)
RBC # FLD: 21.1 % — HIGH (ref 10.3–14.5)
RBC BLD AUTO: ABNORMAL
SAO2 % BLDV: 40.8 % — LOW (ref 67–88)
SODIUM SERPL-SCNC: 135 MMOL/L — SIGNIFICANT CHANGE UP (ref 135–145)
TROPONIN T, HIGH SENSITIVITY RESULT: 80 NG/L — CRITICAL HIGH
TROPONIN T, HIGH SENSITIVITY RESULT: 87 NG/L — CRITICAL HIGH
WBC # BLD: 12.39 K/UL — HIGH (ref 3.8–10.5)
WBC # FLD AUTO: 12.39 K/UL — HIGH (ref 3.8–10.5)

## 2024-04-29 PROCEDURE — 36620 INSERTION CATHETER ARTERY: CPT

## 2024-04-29 PROCEDURE — 99285 EMERGENCY DEPT VISIT HI MDM: CPT | Mod: 25

## 2024-04-29 PROCEDURE — 71045 X-RAY EXAM CHEST 1 VIEW: CPT | Mod: 26

## 2024-04-29 PROCEDURE — 36556 INSERT NON-TUNNEL CV CATH: CPT | Mod: LT

## 2024-04-29 RX ORDER — CALCIUM GLUCONATE 100 MG/ML
1 VIAL (ML) INTRAVENOUS ONCE
Refills: 0 | Status: COMPLETED | OUTPATIENT
Start: 2024-04-29 | End: 2024-04-29

## 2024-04-29 RX ORDER — DEXTROSE 50 % IN WATER 50 %
12.5 SYRINGE (ML) INTRAVENOUS ONCE
Refills: 0 | Status: DISCONTINUED | OUTPATIENT
Start: 2024-04-29 | End: 2024-06-14

## 2024-04-29 RX ORDER — INSULIN LISPRO 100/ML
VIAL (ML) SUBCUTANEOUS
Refills: 0 | Status: DISCONTINUED | OUTPATIENT
Start: 2024-04-29 | End: 2024-05-02

## 2024-04-29 RX ORDER — CARVEDILOL PHOSPHATE 80 MG/1
12.5 CAPSULE, EXTENDED RELEASE ORAL EVERY 12 HOURS
Refills: 0 | Status: ACTIVE | OUTPATIENT
Start: 2024-04-29 | End: 2025-03-28

## 2024-04-29 RX ORDER — DEXTROSE 50 % IN WATER 50 %
15 SYRINGE (ML) INTRAVENOUS ONCE
Refills: 0 | Status: DISCONTINUED | OUTPATIENT
Start: 2024-04-29 | End: 2024-06-14

## 2024-04-29 RX ORDER — SODIUM CHLORIDE 9 MG/ML
1000 INJECTION, SOLUTION INTRAVENOUS
Refills: 0 | Status: DISCONTINUED | OUTPATIENT
Start: 2024-04-29 | End: 2024-06-14

## 2024-04-29 RX ORDER — BACLOFEN 100 %
10 POWDER (GRAM) MISCELLANEOUS ONCE
Refills: 0 | Status: COMPLETED | OUTPATIENT
Start: 2024-04-29 | End: 2024-04-29

## 2024-04-29 RX ORDER — ATORVASTATIN CALCIUM 80 MG/1
1 TABLET, FILM COATED ORAL
Qty: 0 | Refills: 0 | DISCHARGE

## 2024-04-29 RX ORDER — NIFEDIPINE 30 MG
60 TABLET, EXTENDED RELEASE 24 HR ORAL
Refills: 0 | Status: ACTIVE | OUTPATIENT
Start: 2024-04-29 | End: 2025-03-28

## 2024-04-29 RX ORDER — GLUCAGON INJECTION, SOLUTION 0.5 MG/.1ML
1 INJECTION, SOLUTION SUBCUTANEOUS ONCE
Refills: 0 | Status: DISCONTINUED | OUTPATIENT
Start: 2024-04-29 | End: 2024-06-14

## 2024-04-29 RX ORDER — ATORVASTATIN CALCIUM 80 MG/1
20 TABLET, FILM COATED ORAL AT BEDTIME
Refills: 0 | Status: ACTIVE | OUTPATIENT
Start: 2024-04-29 | End: 2025-03-28

## 2024-04-29 RX ORDER — METOCLOPRAMIDE HCL 10 MG
10 TABLET ORAL ONCE
Refills: 0 | Status: COMPLETED | OUTPATIENT
Start: 2024-04-29 | End: 2024-04-29

## 2024-04-29 RX ORDER — HYDRALAZINE HCL 50 MG
100 TABLET ORAL THREE TIMES A DAY
Refills: 0 | Status: ACTIVE | OUTPATIENT
Start: 2024-04-29 | End: 2025-03-28

## 2024-04-29 RX ORDER — INSULIN GLARGINE 100 [IU]/ML
6 INJECTION, SOLUTION SUBCUTANEOUS AT BEDTIME
Refills: 0 | Status: DISCONTINUED | OUTPATIENT
Start: 2024-04-29 | End: 2024-05-02

## 2024-04-29 RX ORDER — INSULIN LISPRO 100/ML
2 VIAL (ML) SUBCUTANEOUS
Refills: 0 | Status: DISCONTINUED | OUTPATIENT
Start: 2024-04-29 | End: 2024-05-02

## 2024-04-29 RX ORDER — BACLOFEN 100 %
10 POWDER (GRAM) MISCELLANEOUS
Refills: 0 | Status: DISCONTINUED | OUTPATIENT
Start: 2024-04-29 | End: 2024-05-01

## 2024-04-29 RX ORDER — HEPARIN SODIUM 5000 [USP'U]/ML
5000 INJECTION INTRAVENOUS; SUBCUTANEOUS EVERY 12 HOURS
Refills: 0 | Status: DISCONTINUED | OUTPATIENT
Start: 2024-04-29 | End: 2024-05-03

## 2024-04-29 RX ORDER — DEXTROSE 50 % IN WATER 50 %
12.5 SYRINGE (ML) INTRAVENOUS ONCE
Refills: 0 | Status: COMPLETED | OUTPATIENT
Start: 2024-04-29 | End: 2024-04-29

## 2024-04-29 RX ORDER — DEXTROSE 10 % IN WATER 10 %
125 INTRAVENOUS SOLUTION INTRAVENOUS ONCE
Refills: 0 | Status: DISCONTINUED | OUTPATIENT
Start: 2024-04-29 | End: 2024-06-14

## 2024-04-29 RX ORDER — DEXTROSE 50 % IN WATER 50 %
25 SYRINGE (ML) INTRAVENOUS ONCE
Refills: 0 | Status: DISCONTINUED | OUTPATIENT
Start: 2024-04-29 | End: 2024-06-14

## 2024-04-29 RX ORDER — ERYTHROPOIETIN 10000 [IU]/ML
10000 INJECTION, SOLUTION INTRAVENOUS; SUBCUTANEOUS
Refills: 0 | Status: DISCONTINUED | OUTPATIENT
Start: 2024-04-29 | End: 2024-06-14

## 2024-04-29 RX ADMIN — Medication 12.5 GRAM(S): at 22:02

## 2024-04-29 RX ADMIN — Medication 100 GRAM(S): at 17:09

## 2024-04-29 RX ADMIN — Medication 10 MILLIGRAM(S): at 17:50

## 2024-04-29 RX ADMIN — Medication 10 MILLIGRAM(S): at 22:02

## 2024-04-29 RX ADMIN — Medication 100 MILLIGRAM(S): at 22:02

## 2024-04-29 RX ADMIN — ATORVASTATIN CALCIUM 20 MILLIGRAM(S): 80 TABLET, FILM COATED ORAL at 22:02

## 2024-04-29 RX ADMIN — Medication 100 GRAM(S): at 16:37

## 2024-04-29 RX ADMIN — INSULIN GLARGINE 6 UNIT(S): 100 INJECTION, SOLUTION SUBCUTANEOUS at 22:44

## 2024-04-29 NOTE — PROVIDER CONTACT NOTE (HYPOGLYCEMIA EVENT) - NS PROVIDER CONTACT BACKGROUND-HYPO
Age: 73y    Gender: Male    POCT Blood Glucose:  70 mg/dL (04-29-24 @ 21:53)  66 mg/dL (04-29-24 @ 21:49)  278 mg/dL (04-29-24 @ 15:48)      eMAR:atorvastatin   20 milliGRAM(s) Oral (04-29-24 @ 22:02)    dextrose 50% Injectable   12.5 Gram(s) IV Push (04-29-24 @ 22:02)

## 2024-04-29 NOTE — ED PROVIDER NOTE - CLINICAL SUMMARY MEDICAL DECISION MAKING FREE TEXT BOX
73-year-old male past medical history hypertension, ESRD on dialysis Monday Wednesday Friday, diabetes insulin-dependent presents with hiccups patient endorses persistent hiccups for the last 2 days. Patient went to dialysis 73-year-old male past medical history hypertension, ESRD on dialysis Monday Wednesday Friday, diabetes insulin-dependent presents with hiccups patient endorses persistent hiccups for the last 2 days. Patient went to dialysis today - 3 hours, normal scheduled is MWF. Denies chest pain, sob, lightheadness, dizziness, palpitations, syncope. On arrival ECG indicate hyperacute T waves, bradycardia, no ST wave elevations/depressions. PE indicates no murmurs on auscultations, lungs clear to auscultation, no peripheral edema/swelling. Differential includes hyperkalemia, other electrolyte abnormality, ACS, pericarditis, myocarditis. Will order trop, cbc/cmp/pt and ptt, calcium gluconate 2G, ECG, CXR and reassess.

## 2024-04-29 NOTE — H&P ADULT - NSHPPHYSICALEXAM_GEN_ALL_CORE
T(C): 36.8 (04-29-24 @ 20:17), Max: 36.8 (04-29-24 @ 20:17)  HR: 55 (04-29-24 @ 20:17) (54 - 56)  BP: 145/81 (04-29-24 @ 20:17) (145/81 - 158/61)  RR: 18 (04-29-24 @ 20:17) (18 - 20)  SpO2: 98% (04-29-24 @ 20:17) (98% - 100%)    PHYSICAL EXAM:  GENERAL: NAD, well-developed  HEAD:  Atraumatic, Normocephalic  EYES: EOMI, PERRLA, conjunctiva and sclera clear  NECK: Supple, No JVD  CHEST/LUNG: Clear to auscultation bilaterally; No wheeze  HEART: Regular rate and rhythm; No murmurs, rubs, or gallops  ABDOMEN: Soft, Nontender, Nondistended; Bowel sounds present  EXTREMITIES:  2+ Peripheral Pulses, No clubbing, cyanosis, or edema  PSYCH: AAOx3  NEUROLOGY: non-focal  SKIN: No rashes or lesions

## 2024-04-29 NOTE — H&P ADULT - HISTORY OF PRESENT ILLNESS
73-year-old male past medical history hypertension, ESRD on dialysis Monday Wednesday Friday, diabetes insulin-dependent presents with hiccups patient endorses persistent hiccups for the last 2 days. found to have peaked T waves, a new finding. denies dizziness, N/V, denies CP, palps, abd pain

## 2024-04-29 NOTE — ED ADULT NURSE NOTE - CHIEF COMPLAINT QUOTE
pt coming with hiccups started on Friday denies CP, no Dizziness, no N/V, has dialysis today   Hx DM2, dialysis left arm av fistula

## 2024-04-29 NOTE — ED ADULT NURSE NOTE - NSFALLUNIVINTERV_ED_ALL_ED
Bed/Stretcher in lowest position, wheels locked, appropriate side rails in place/Call bell, personal items and telephone in reach/Instruct patient to call for assistance before getting out of bed/chair/stretcher/Non-slip footwear applied when patient is off stretcher/[place] to call system/Physically safe environment - no spills, clutter or unnecessary equipment/Purposeful proactive rounding/Room/bathroom lighting operational, light cord in reach

## 2024-04-29 NOTE — ED ADULT NURSE NOTE - OBJECTIVE STATEMENT
Facilitator RN: Patient presents to ED for hiccups since Friday. At triage, EKG was shown to be abnormal and patient brought to room 16 for evaluation of possible STEMI. He is A&Ox4, in no acute distress, calm. He states he has been having persistent hiccups since Friday after starting a new kidney medication. Patient denies CP, dizziness, SOB, dyspnea, N/V, fevers, chills. Respirations even, unlabored on room air. No pallor, no cyanosis, no diaphoresis. He has right arm AV fistula, dialysis Monday, Wednesday, Friday. Last dialysis today. History of HTN, DM2, CKD. On cardiac monitor showing SR. 20G IV placed left AC, labs drawn and sent.  Endorsed to primary RN. Facilitator RN: Patient presents to ED for hiccups since Friday. At triage, EKG was shown to be abnormal and patient brought to room 16 for evaluation of possible STEMI. He is A&Ox4, in no acute distress, calm. He states he has been having persistent hiccups since Friday after starting a new kidney medication. Patient denies CP, dizziness, SOB, dyspnea, N/V, fevers, chills. Respirations even, unlabored on room air. No pallor, no cyanosis, no diaphoresis. He has right arm AV fistula, dialysis Monday, Wednesday, Friday. Last dialysis today. History of HTN, DM2, CKD. On cardiac monitor showing SR. 20G IV placed left AC, labs drawn and sent. VS stable. Endorsed to primary RN.

## 2024-04-29 NOTE — H&P ADULT - ASSESSMENT
73-year-old male past medical history hypertension, ESRD on dialysis Monday Wednesday Friday, diabetes insulin-dependent presents with hiccups patient endorses persistent hiccups for the last 2 days. found to have peaked T waves, a new finding. denies dizziness, N/V, denies CP, palps, abd pain    cardiology informed.  check TTE  cont outptn meds  check CT C/A/P, will need HD 2/2 contrast  start Baclofen  renal called  cont outptn meds  dvt ppx w HSC

## 2024-04-29 NOTE — ED PROVIDER NOTE - ATTENDING CONTRIBUTION TO CARE
I (Nick) agree with above, I performed a history and physical. Counseled [facility] medical staff, physician assistant, and/or medical student on medical decision making as documented. Medical decisions and treatment interventions were made in real time during the patient encounter. Additionally and/or with the following exceptions: the patient with past medical history of esrd, hypertension , mwf, presents to the emergency department with persistent hiccups. the patient was well appearing, abdomen soft, non-tender, non-distended. ekg with peaked t waves, other labs not emergently actionable, but patient [id number] admission for medical atomization. The patient's condition was not amenable to outpatient treatment due to either the lack of feasibility of outpatient care coordination, possibility for further decompensation with adverse outcome if discharge, or treatments and diagnostic  modalities only available during an inpatient hospitalization.

## 2024-04-29 NOTE — ED PROVIDER NOTE - PHYSICAL EXAMINATION
Physical Exam:  Gen: NAD, AOx3, non-toxic appearing, able to ambulate without assistance  Head: NCAT  HEENT: EOMI, PEERLA, normal conjunctiva, tongue midline, oral mucosa moist  Lung: CTAB, no respiratory distress, no wheezes/rhonchi/rales B/L, speaking in full sentences  CV: RRR, no murmurs, rubs or gallops  Abd: soft, NT, ND, no guarding, no rigidity, no rebound tenderness, no CVA tenderness   MSK: no visible deformities, ROM normal in UE/LE, no back pain  Neuro: No focal sensory or motor deficits  Skin: Warm, well perfused, no rash, no leg swelling  Vascular: palpable thrill R AV fistula   Psych: normal affect, calm

## 2024-04-29 NOTE — CONSULT NOTE ADULT - SUBJECTIVE AND OBJECTIVE BOX
[facility]   MD ELINAE BHAGAT MD MARIA SANTIAGO, NP        TEL:  OFFICE: [phone]  From 5pm-7am answering service [phone]    --- INITIAL RENAL CONSULT NOTE ---date of service 04-29-24 @ 18:46    HPI:  73-year-old male past medical history hypertension, ESRD on dialysis Monday Wednesday Friday, diabetes insulin-dependent presents with hiccups patient endorses persistent hiccups for the last 2 days. Nephrology consulted for HD needs.        Allergies:  No Known Allergies      PAST MEDICAL & SURGICAL HISTORY:  Diabetes      Benign essential HTN      HLD (hyperlipidemia)      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      ESRD on hemodialysis      Arteriovenous fistula          Home Medications Reviewed    Hospital Medications:   MEDICATIONS  (STANDING):      SOCIAL HISTORY:  Denies ETOh, Smoking,     FAMILY HISTORY:  FHx: diabetes mellitus (Father, Aunt)        REVIEW OF SYSTEMS:  CONSTITUTIONAL: No weakness, fevers or chills  EYES/ENT: No visual changes;  No vertigo or throat pain   NECK: No pain or stiffness  RESPIRATORY: No cough, wheezing, hemoptysis; No shortness of breath  CARDIOVASCULAR: No chest pain or palpitations.  GASTROINTESTINAL: as per HPI  GENITOURINARY: No dysuria, frequency, foamy urine, urinary urgency, incontinence or hematuria  NEUROLOGICAL: No numbness or weakness  SKIN: No itching, burning, rashes, or lesions   VASCULAR: No bilateral lower extremity edema.   All other review of systems is negative unless indicated above.    VITALS:  T(F): 98 (04-29-24 @ 15:45), Max: 98 (04-29-24 @ 15:45)  HR: 54 (04-29-24 @ 16:31)  BP: 158/61 (04-29-24 @ 16:31)  RR: 19 (04-29-24 @ 16:31)  SpO2: 100% (04-29-24 @ 16:31)  Wt(kg): --        PHYSICAL EXAM:  General: NAD  HEENT: anicteric sclera, oropharynx clear, MMM  Neck: No JVD  Respiratory: CTAB, no wheezes, rales or rhonchi  Cardiovascular: S1, S2, RRR  Gastrointestinal: BS+, soft, NT/ND; +hiccups  Extremities: No cyanosis or clubbing. No peripheral edema  Neurological: A/O x 3, no focal deficits  Psychiatric: Normal mood, normal affect  : No CVA tenderness. No belle.   Skin: No rashes  Vascular Access: R AVF    LABS:  04-29    135  |  93<L>  |  43<H>  ----------------------------<  236<H>  5.2   |  25  |  6.28<H>    Ca    8.6      29 Apr 2024 16:19  Phos  5.3     04-29  Mg     2.70     04-29    TPro  7.8  /  Alb  4.8  /  TBili  0.7  /  DBili      /  AST  17  /  ALT  10  /  AlkPhos  92  04-29    Creatinine Trend: 6.28 <--                        12.3   12.39 )-----------( 97       ( 29 Apr 2024 16:19 )             38.2     Urine Studies:  Urinalysis Basic - ( 29 Apr 2024 16:19 )    Color:  / Appearance:  / SG:  / pH:   Gluc: 236 mg/dL / Ketone:   / Bili:  / Urobili:    Blood:  / Protein:  / Nitrite:    Leuk Esterase:  / RBC:  / WBC    Sq Epi:  / Non Sq Epi:  / Bacteria:           RADIOLOGY & ADDITIONAL STUDIES:

## 2024-04-29 NOTE — CONSULT NOTE ADULT - ASSESSMENT
73-year-old male past medical history hypertension, ESRD on dialysis Monday Wednesday Friday, diabetes insulin-dependent presents with hiccups patient endorses persistent hiccups for the last 2 days. Nephrology consulted for HD needs.    A/P  ESRD:  Center: [place]  Nephrologist: Dr. Franklin  Access: R AVF  MWF schedule  Consent obtained and placed in ED chart  Last dialyzed today  Renal diet  Monitor BMP    HTN:  Fluctuating  Resume home meds  UF with HD  Monitor BP    Anemia:  Above goal  Monitor hb    CKD-MBD  PO4 acceptable  Get PTH  Monitor Ca, PO4 daily   73-year-old male past medical history hypertension, ESRD on dialysis Monday Wednesday Friday, diabetes insulin-dependent presents with hiccups patient endorses persistent hiccups for the last 2 days. Nephrology consulted for HD needs.    A/P  ESRD:  Center: [place]  Nephrologist: Dr. Hardwick  Access: R AV  MWF schedule  Consent obtained and placed in ED chart  Last dialyzed today  Renal diet  Monitor BMP    HTN:  Fluctuating  Resume home meds  UF with HD  Monitor BP    Anemia:  Above goal  Monitor hb    CKD-MBD  PO4 acceptable  Get PTH  Monitor Ca, PO4 daily

## 2024-04-29 NOTE — ED ADULT TRIAGE NOTE - CHIEF COMPLAINT QUOTE
pt coming with hiccups started on Friday denies CP, no Dizziness, no N/V   Hx DM2 pt coming with hiccups started on Friday denies CP, no Dizziness, no N/V, has dialysis today   Hx DM2, dialysis left arm av fistula

## 2024-04-29 NOTE — ED PROVIDER NOTE - OBJECTIVE STATEMENT
73-year-old male past medical history hypertension, ESRD on dialysis Monday Wednesday Friday, diabetes insulin-dependent presents with hiccups patient endorses persistent hiccups for the last 2 days.  Patient states he went to 3 hours of dialysis this morning, emergency room.  Denies chest pain, shortness of breath, dizziness, lightheadedness, syncope, diaphoresis, fever.

## 2024-04-30 ENCOUNTER — RESULT REVIEW (OUTPATIENT)
Age: 74
End: 2024-04-30

## 2024-04-30 LAB
A1C WITH ESTIMATED AVERAGE GLUCOSE RESULT: 6.9 % — HIGH (ref 4–5.6)
ANION GAP SERPL CALC-SCNC: 17 MMOL/L — HIGH (ref 7–14)
BASOPHILS # BLD AUTO: 0.04 K/UL — SIGNIFICANT CHANGE UP (ref 0–0.2)
BASOPHILS NFR BLD AUTO: 0.6 % — SIGNIFICANT CHANGE UP (ref 0–2)
BUN SERPL-MCNC: 49 MG/DL — HIGH (ref 7–23)
CALCIUM SERPL-MCNC: 8.8 MG/DL — SIGNIFICANT CHANGE UP (ref 8.4–10.5)
CHLORIDE SERPL-SCNC: 95 MMOL/L — LOW (ref 98–107)
CO2 SERPL-SCNC: 25 MMOL/L — SIGNIFICANT CHANGE UP (ref 22–31)
CREAT SERPL-MCNC: 7.74 MG/DL — HIGH (ref 0.5–1.3)
EGFR: 7 ML/MIN/1.73M2 — LOW
EOSINOPHIL # BLD AUTO: 0.42 K/UL — SIGNIFICANT CHANGE UP (ref 0–0.5)
EOSINOPHIL NFR BLD AUTO: 5.9 % — SIGNIFICANT CHANGE UP (ref 0–6)
ESTIMATED AVERAGE GLUCOSE: 151 — SIGNIFICANT CHANGE UP
GLUCOSE BLDC GLUCOMTR-MCNC: 114 MG/DL — HIGH (ref 70–99)
GLUCOSE BLDC GLUCOMTR-MCNC: 125 MG/DL — HIGH (ref 70–99)
GLUCOSE BLDC GLUCOMTR-MCNC: 181 MG/DL — HIGH (ref 70–99)
GLUCOSE BLDC GLUCOMTR-MCNC: 206 MG/DL — HIGH (ref 70–99)
GLUCOSE BLDC GLUCOMTR-MCNC: 80 MG/DL — SIGNIFICANT CHANGE UP (ref 70–99)
GLUCOSE SERPL-MCNC: 60 MG/DL — LOW (ref 70–99)
HCT VFR BLD CALC: 36.5 % — LOW (ref 39–50)
HGB BLD-MCNC: 11.8 G/DL — LOW (ref 13–17)
IANC: 4.55 K/UL — SIGNIFICANT CHANGE UP (ref 1.8–7.4)
IMM GRANULOCYTES NFR BLD AUTO: 0.3 % — SIGNIFICANT CHANGE UP (ref 0–0.9)
LYMPHOCYTES # BLD AUTO: 1.31 K/UL — SIGNIFICANT CHANGE UP (ref 1–3.3)
LYMPHOCYTES # BLD AUTO: 18.4 % — SIGNIFICANT CHANGE UP (ref 13–44)
MAGNESIUM SERPL-MCNC: 2.7 MG/DL — HIGH (ref 1.6–2.6)
MCHC RBC-ENTMCNC: 20.3 PG — LOW (ref 27–34)
MCHC RBC-ENTMCNC: 32.3 GM/DL — SIGNIFICANT CHANGE UP (ref 32–36)
MCV RBC AUTO: 62.9 FL — LOW (ref 80–100)
MONOCYTES # BLD AUTO: 0.78 K/UL — SIGNIFICANT CHANGE UP (ref 0–0.9)
MONOCYTES NFR BLD AUTO: 11 % — SIGNIFICANT CHANGE UP (ref 2–14)
NEUTROPHILS # BLD AUTO: 4.55 K/UL — SIGNIFICANT CHANGE UP (ref 1.8–7.4)
NEUTROPHILS NFR BLD AUTO: 63.8 % — SIGNIFICANT CHANGE UP (ref 43–77)
NRBC # BLD: 0 /100 WBCS — SIGNIFICANT CHANGE UP (ref 0–0)
NRBC # FLD: 0.02 K/UL — HIGH (ref 0–0)
PHOSPHATE SERPL-MCNC: 6.6 MG/DL — HIGH (ref 2.5–4.5)
PLATELET # BLD AUTO: 98 K/UL — LOW (ref 150–400)
POTASSIUM SERPL-MCNC: 5.2 MMOL/L — SIGNIFICANT CHANGE UP (ref 3.5–5.3)
POTASSIUM SERPL-SCNC: 5.2 MMOL/L — SIGNIFICANT CHANGE UP (ref 3.5–5.3)
RBC # BLD: 5.8 M/UL — SIGNIFICANT CHANGE UP (ref 4.2–5.8)
RBC # FLD: 20.5 % — HIGH (ref 10.3–14.5)
SODIUM SERPL-SCNC: 137 MMOL/L — SIGNIFICANT CHANGE UP (ref 135–145)
TSH SERPL-MCNC: 2.79 UIU/ML — SIGNIFICANT CHANGE UP (ref 0.27–4.2)
WBC # BLD: 7.12 K/UL — SIGNIFICANT CHANGE UP (ref 3.8–10.5)
WBC # FLD AUTO: 7.12 K/UL — SIGNIFICANT CHANGE UP (ref 3.8–10.5)

## 2024-04-30 PROCEDURE — 71260 CT THORAX DX C+: CPT | Mod: 26

## 2024-04-30 PROCEDURE — 99291 CRITICAL CARE FIRST HOUR: CPT | Mod: GC,25

## 2024-04-30 PROCEDURE — 93306 TTE W/DOPPLER COMPLETE: CPT | Mod: 26

## 2024-04-30 PROCEDURE — 74177 CT ABD & PELVIS W/CONTRAST: CPT | Mod: 26

## 2024-04-30 RX ORDER — METOCLOPRAMIDE HCL 10 MG
10 TABLET ORAL ONCE
Refills: 0 | Status: COMPLETED | OUTPATIENT
Start: 2024-04-30 | End: 2024-04-30

## 2024-04-30 RX ORDER — HALOPERIDOL DECANOATE 100 MG/ML
1 INJECTION INTRAMUSCULAR ONCE
Refills: 0 | Status: COMPLETED | OUTPATIENT
Start: 2024-04-30 | End: 2024-04-30

## 2024-04-30 RX ORDER — CALCIUM ACETATE 667 MG
667 TABLET ORAL
Refills: 0 | Status: ACTIVE | OUTPATIENT
Start: 2024-04-30 | End: 2025-03-29

## 2024-04-30 RX ORDER — METOCLOPRAMIDE HCL 10 MG
10 TABLET ORAL
Refills: 0 | Status: ACTIVE | OUTPATIENT
Start: 2024-04-30 | End: 2025-03-29

## 2024-04-30 RX ADMIN — Medication 100 MILLIGRAM(S): at 13:37

## 2024-04-30 RX ADMIN — Medication 10 MILLIGRAM(S): at 17:49

## 2024-04-30 RX ADMIN — HEPARIN SODIUM 5000 UNIT(S): 5000 INJECTION INTRAVENOUS; SUBCUTANEOUS at 17:49

## 2024-04-30 RX ADMIN — HALOPERIDOL DECANOATE 1 MILLIGRAM(S): 100 INJECTION INTRAMUSCULAR at 23:15

## 2024-04-30 RX ADMIN — HEPARIN SODIUM 5000 UNIT(S): 5000 INJECTION INTRAVENOUS; SUBCUTANEOUS at 05:05

## 2024-04-30 RX ADMIN — Medication 2 UNIT(S): at 08:58

## 2024-04-30 RX ADMIN — Medication 2 UNIT(S): at 17:49

## 2024-04-30 RX ADMIN — Medication 100 MILLIGRAM(S): at 05:01

## 2024-04-30 RX ADMIN — Medication 60 MILLIGRAM(S): at 17:49

## 2024-04-30 RX ADMIN — Medication 10 MILLIGRAM(S): at 05:01

## 2024-04-30 RX ADMIN — Medication 2 UNIT(S): at 13:06

## 2024-04-30 RX ADMIN — Medication 10 MILLIGRAM(S): at 22:23

## 2024-04-30 RX ADMIN — ATORVASTATIN CALCIUM 20 MILLIGRAM(S): 80 TABLET, FILM COATED ORAL at 21:50

## 2024-04-30 RX ADMIN — CARVEDILOL PHOSPHATE 12.5 MILLIGRAM(S): 80 CAPSULE, EXTENDED RELEASE ORAL at 10:33

## 2024-04-30 RX ADMIN — Medication 10 MILLIGRAM(S): at 13:06

## 2024-04-30 RX ADMIN — Medication 60 MILLIGRAM(S): at 05:01

## 2024-04-30 RX ADMIN — Medication 100 MILLIGRAM(S): at 21:50

## 2024-04-30 RX ADMIN — CARVEDILOL PHOSPHATE 12.5 MILLIGRAM(S): 80 CAPSULE, EXTENDED RELEASE ORAL at 21:50

## 2024-04-30 RX ADMIN — Medication 667 MILLIGRAM(S): at 17:49

## 2024-04-30 RX ADMIN — INSULIN GLARGINE 6 UNIT(S): 100 INJECTION, SOLUTION SUBCUTANEOUS at 21:50

## 2024-04-30 RX ADMIN — Medication 1: at 13:11

## 2024-04-30 NOTE — PATIENT PROFILE ADULT - FUNCTIONAL SCREEN CURRENT LEVEL: SWALLOWING (IF SCORE 2 OR MORE FOR ANY ITEM, CONSULT REHAB SERVICES), MLM)
----- Message from Jersey Lundberg MD sent at 4/27/2023 10:15 AM CDT -----  Please inform patient levels were normal at this time.   0 = swallows foods/liquids without difficulty

## 2024-04-30 NOTE — PROGRESS NOTE ADULT - ASSESSMENT
73-year-old male past medical history hypertension, ESRD on dialysis Monday Wednesday Friday, diabetes insulin-dependent presents with hiccups patient endorses persistent hiccups for the last 2 days. Nephrology consulted for HD needs.    A/P  ESRD:  Center: [place]  Nephrologist: Dr. Hardwick  Access: R AVF  MWF schedule  Consent obtained and placed in ED chart  Last dialyzed 4/29  Renal diet  Monitor BMP    HTN:  Fluctuating  Continue w/ meds  UF with HD  Monitor BP    Anemia:  Above goal  Monitor hb    CKD-MBD  PO4 elevated  Start calcium acetate TID  Get PTH  Monitor Ca, PO4 daily   73-year-old male past medical history hypertension, ESRD on dialysis Monday Wednesday Friday, diabetes insulin-dependent presents with hiccups patient endorses persistent hiccups for the last 2 days. Nephrology consulted for HD needs.    A/P  ESRD:  Center: [place]  Nephrologist: Dr. Hardwick  Access: R AVF  MWF schedule  Consent obtained and placed in ED chart  Last dialyzed 4/29  Renal diet  Monitor BMP    HTN:  Fluctuating  Continue w/ meds  UF with HD  Monitor BP    Anemia:  Above goal  Monitor hb    CKD-MBD  PO4 elevated  Start calcium acetate 667mg TID  Get PTH  Monitor Ca, PO4 daily

## 2024-04-30 NOTE — PROGRESS NOTE ADULT - ASSESSMENT
73-year-old male past medical history hypertension, ESRD on dialysis Monday Wednesday Friday, diabetes insulin-dependent presents with hiccups patient endorses persistent hiccups for the last 2 days. found to have peaked T waves, a new finding. denies dizziness, N/V, denies CP, palps, abd pain    check TTE  feels well, awaiting [facility] pharm stress test. had demand ischemia on prev admission recently in january  cont outptn meds  check CT C/A/P, will need HD 2/2 contrast  cont Baclofen  renal  and cardiology following  cont outptn meds  dvt ppx w HSC 73-year-old male past medical history hypertension, ESRD on dialysis Monday Wednesday Friday, diabetes insulin-dependent presents with hiccups patient endorses persistent hiccups for the last 2 days. found to have peaked T waves, a new finding. denies dizziness, N/V, denies CP, palps, abd pain    check TTE  feels well, awaiting Nucl pharm stress test. had demand ischemia on prev admission recently in january  cont outptn meds   CT C/A/P: distended stomach, prob 2/2 gastroparesis, GI called. start Reglan 10 mg prior to meals   cont Baclofen  renal  and cardiology following  cont outptn meds  dvt ppx w HSC

## 2024-04-30 NOTE — PROGRESS NOTE ADULT - SUBJECTIVE AND OBJECTIVE BOX
[facility] [place]   MD ELIANE BHAGAT MD MARIA SANTIAGO, NP    TEL:  OFFICE: [phone]  From 5pm-7am Answering Service [phone]    -- RENAL FOLLOW UP NOTE ---Date of Service 04-30-24 @ 14:04    Patient is a 73y old  Male who presents with a chief complaint of Unstable angina, abn EKG       Patient seen and examined at bedside. No chest pain/sob    VITALS:  T(F): 97.6 (04-30-24 @ 13:30), Max: 98.2 (04-29-24 @ 20:17)  HR: 52 (04-30-24 @ 13:30)  BP: 165/56 (04-30-24 @ 13:30)  RR: 16 (04-30-24 @ 13:30)  SpO2: 100% (04-30-24 @ 13:30)  Wt(kg): --    Height (cm): 172.7 (04-29 @ 23:49)  Weight (kg): 52.617 (04-29 @ 23:49)  BMI (kg/m2): 17.6 (04-29 @ 23:49)  BSA (m2): 1.62 (04-29 @ 23:49)    PHYSICAL EXAM:  General: NAD  Neck: No JVD  Respiratory: CTAB, no wheezes, rales or rhonchi  Cardiovascular: S1, S2, RRR  Gastrointestinal: BS+, soft, NT/ND  Extremities: No peripheral edema    Hospital Medications:   MEDICATIONS  (STANDING):  atorvastatin 20 milliGRAM(s) Oral at bedtime  baclofen 10 milliGRAM(s) Oral <User Schedule>  carvedilol 12.5 milliGRAM(s) Oral every 12 hours  dextrose 10% Bolus 125 milliLiter(s) IV Bolus once  dextrose 5%. 1000 milliLiter(s) (50 mL/Hr) IV Continuous <Continuous>  dextrose 5%. 1000 milliLiter(s) (100 mL/Hr) IV Continuous <Continuous>  dextrose 50% Injectable 25 Gram(s) IV Push once  dextrose 50% Injectable 12.5 Gram(s) IV Push once  epoetin reilly (EPOGEN) Injectable 16555 Unit(s) IV Push <User Schedule>  glucagon  Injectable 1 milliGRAM(s) IntraMuscular once  heparin   Injectable 5000 Unit(s) SubCutaneous every 12 hours  hydrALAZINE 100 milliGRAM(s) Oral three times a day  insulin glargine Injectable (LANTUS) 6 Unit(s) SubCutaneous at bedtime  insulin lispro (ADMELOG) corrective regimen sliding scale   SubCutaneous three times a day before meals  insulin lispro Injectable (ADMELOG) 2 Unit(s) SubCutaneous three times a day before meals  NIFEdipine XL 60 milliGRAM(s) Oral two times a day      LABS:  04-30    137  |  95<L>  |  49<H>  ----------------------------<  60<L>  5.2   |  25  |  7.74<H>    Ca    8.8      30 Apr 2024 06:03  Phos  6.6     04-30  Mg     2.70     04-30    TPro  7.8  /  Alb  4.8  /  TBili  0.7  /  DBili      /  AST  17  /  ALT  10  /  AlkPhos  92  04-29    Creatinine Trend: 7.74 <--, 6.28 <--    Phosphorus: 6.6 mg/dL (04-30 @ 06:03)  Albumin: 4.8 g/dL (04-29 @ 16:19)  Phosphorus: 5.3 mg/dL (04-29 @ 16:19)                              11.8   7.12  )-----------( 98       ( 30 Apr 2024 06:03 )             36.5     Urine Studies:  Urinalysis - [04-30-24 @ 06:03]      Color  / Appearance  / SG  / pH       Gluc 60 / Ketone   / Bili  / Urobili        Blood  / Protein  / Leuk Est  / Nitrite       RBC  / WBC  / Hyaline  / Gran  / Sq Epi  / Non Sq Epi  / Bacteria       TSH 2.79      [04-30-24 @ 06:03]        RADIOLOGY & ADDITIONAL STUDIES:

## 2024-04-30 NOTE — PROGRESS NOTE ADULT - SUBJECTIVE AND OBJECTIVE BOX
Patient is a 73y old  Male who presents with a chief complaint of Unstable angina, abn EKG (30 Apr 2024 14:04)      SUBJECTIVE / OVERNIGHT EVENTS: feels well, awaiting Nucl pharm stress test. had demand ischemia on prev admission recently in january    MEDICATIONS  (STANDING):  atorvastatin 20 milliGRAM(s) Oral at bedtime  baclofen 10 milliGRAM(s) Oral <User Schedule>  calcium acetate 667 milliGRAM(s) Oral three times a day with meals  carvedilol 12.5 milliGRAM(s) Oral every 12 hours  dextrose 10% Bolus 125 milliLiter(s) IV Bolus once  dextrose 5%. 1000 milliLiter(s) (100 mL/Hr) IV Continuous <Continuous>  dextrose 5%. 1000 milliLiter(s) (50 mL/Hr) IV Continuous <Continuous>  dextrose 50% Injectable 25 Gram(s) IV Push once  dextrose 50% Injectable 12.5 Gram(s) IV Push once  epoetin reilly (EPOGEN) Injectable 12338 Unit(s) IV Push <User Schedule>  glucagon  Injectable 1 milliGRAM(s) IntraMuscular once  heparin   Injectable 5000 Unit(s) SubCutaneous every 12 hours  hydrALAZINE 100 milliGRAM(s) Oral three times a day  insulin glargine Injectable (LANTUS) 6 Unit(s) SubCutaneous at bedtime  insulin lispro (ADMELOG) corrective regimen sliding scale   SubCutaneous three times a day before meals  insulin lispro Injectable (ADMELOG) 2 Unit(s) SubCutaneous three times a day before meals  NIFEdipine XL 60 milliGRAM(s) Oral two times a day    MEDICATIONS  (PRN):  dextrose Oral Gel 15 Gram(s) Oral once PRN Blood Glucose LESS THAN 70 milliGRAM(s)/deciliter      Vital Signs Last 24 Hrs  T(F): 97.6 (04-30-24 @ 17:40), Max: 97.9 (04-30-24 @ 10:24)  HR: 56 (04-30-24 @ 17:40) (51 - 56)  BP: 150/56 (04-30-24 @ 17:40) (135/63 - 165/56)  RR: 18 (04-30-24 @ 17:40) (16 - 18)  SpO2: 100% (04-30-24 @ 17:40) (98% - 100%)  Telemetry:   CAPILLARY BLOOD GLUCOSE      POCT Blood Glucose.: 125 mg/dL (30 Apr 2024 17:37)  POCT Blood Glucose.: 181 mg/dL (30 Apr 2024 13:02)  POCT Blood Glucose.: 80 mg/dL (30 Apr 2024 08:49)  POCT Blood Glucose.: 114 mg/dL (30 Apr 2024 03:04)  POCT Blood Glucose.: 194 mg/dL (29 Apr 2024 22:41)  POCT Blood Glucose.: 173 mg/dL (29 Apr 2024 22:25)  POCT Blood Glucose.: 70 mg/dL (29 Apr 2024 21:53)  POCT Blood Glucose.: 66 mg/dL (29 Apr 2024 21:49)    I&O's Summary      PHYSICAL EXAM:  GENERAL: NAD, well-developed  HEAD:  Atraumatic, Normocephalic  EYES: EOMI, PERRLA, conjunctiva and sclera clear  NECK: Supple, No JVD  CHEST/LUNG: Clear to auscultation bilaterally; No wheeze  HEART: Regular rate and rhythm; No murmurs, rubs, or gallops  ABDOMEN: Soft, Nontender, Nondistended; Bowel sounds present  EXTREMITIES:  2+ Peripheral Pulses, No clubbing, cyanosis, or edema  PSYCH: AAOx3  NEUROLOGY: non-focal  SKIN: No rashes or lesions    LABS:                        11.8   7.12  )-----------( 98       ( 30 Apr 2024 06:03 )             36.5     04-30    137  |  95<L>  |  49<H>  ----------------------------<  60<L>  5.2   |  25  |  7.74<H>    Ca    8.8      30 Apr 2024 06:03  Phos  6.6     04-30  Mg     2.70     04-30    TPro  7.8  /  Alb  4.8  /  TBili  0.7  /  DBili  x   /  AST  17  /  ALT  10  /  AlkPhos  92  04-29    PT/INR - ( 29 Apr 2024 16:19 )   PT: 11.9 sec;   INR: 1.06 ratio         PTT - ( 29 Apr 2024 16:19 )  PTT:32.2 sec  CARDIAC MARKERS ( 29 Apr 2024 16:19 )  x     / x     / 50 U/L / x     / 1.7 ng/mL      Urinalysis Basic - ( 30 Apr 2024 06:03 )    Color: x / Appearance: x / SG: x / pH: x  Gluc: 60 mg/dL / Ketone: x  / Bili: x / Urobili: x   Blood: x / Protein: x / Nitrite: x   Leuk Esterase: x / RBC: x / WBC x   Sq Epi: x / Non Sq Epi: x / Bacteria: x        RADIOLOGY & ADDITIONAL TESTS:    Imaging Personally Reviewed:    Consultant(s) Notes Reviewed:      Care Discussed with Consultants/Other Providers:

## 2024-05-01 LAB
ALBUMIN SERPL ELPH-MCNC: 4.4 G/DL — SIGNIFICANT CHANGE UP (ref 3.3–5)
ALP SERPL-CCNC: 94 U/L — SIGNIFICANT CHANGE UP (ref 40–120)
ALT FLD-CCNC: 8 U/L — SIGNIFICANT CHANGE UP (ref 4–41)
AMMONIA BLD-MCNC: 10 UMOL/L — LOW (ref 11–55)
ANION GAP SERPL CALC-SCNC: 17 MMOL/L — HIGH (ref 7–14)
ANION GAP SERPL CALC-SCNC: 20 MMOL/L — HIGH (ref 7–14)
ANION GAP SERPL CALC-SCNC: 22 MMOL/L — HIGH (ref 7–14)
AST SERPL-CCNC: 10 U/L — SIGNIFICANT CHANGE UP (ref 4–40)
BASE EXCESS BLDV CALC-SCNC: 2.4 MMOL/L — SIGNIFICANT CHANGE UP (ref -2–3)
BASOPHILS # BLD AUTO: 0.03 K/UL — SIGNIFICANT CHANGE UP (ref 0–0.2)
BASOPHILS # BLD AUTO: 0.04 K/UL — SIGNIFICANT CHANGE UP (ref 0–0.2)
BASOPHILS NFR BLD AUTO: 0.3 % — SIGNIFICANT CHANGE UP (ref 0–2)
BASOPHILS NFR BLD AUTO: 0.3 % — SIGNIFICANT CHANGE UP (ref 0–2)
BILIRUB SERPL-MCNC: 0.6 MG/DL — SIGNIFICANT CHANGE UP (ref 0.2–1.2)
BLOOD GAS VENOUS COMPREHENSIVE RESULT: SIGNIFICANT CHANGE UP
BUN SERPL-MCNC: 40 MG/DL — HIGH (ref 7–23)
BUN SERPL-MCNC: 47 MG/DL — HIGH (ref 7–23)
BUN SERPL-MCNC: 61 MG/DL — HIGH (ref 7–23)
CALCIUM SERPL-MCNC: 8.9 MG/DL — SIGNIFICANT CHANGE UP (ref 8.4–10.5)
CALCIUM SERPL-MCNC: 9 MG/DL — SIGNIFICANT CHANGE UP (ref 8.4–10.5)
CALCIUM SERPL-MCNC: 9.6 MG/DL — SIGNIFICANT CHANGE UP (ref 8.4–10.5)
CHLORIDE BLDV-SCNC: 94 MMOL/L — LOW (ref 96–108)
CHLORIDE SERPL-SCNC: 89 MMOL/L — LOW (ref 98–107)
CHLORIDE SERPL-SCNC: 92 MMOL/L — LOW (ref 98–107)
CHLORIDE SERPL-SCNC: 92 MMOL/L — LOW (ref 98–107)
CO2 BLDV-SCNC: 30.4 MMOL/L — HIGH (ref 22–26)
CO2 SERPL-SCNC: 22 MMOL/L — SIGNIFICANT CHANGE UP (ref 22–31)
CO2 SERPL-SCNC: 26 MMOL/L — SIGNIFICANT CHANGE UP (ref 22–31)
CO2 SERPL-SCNC: 27 MMOL/L — SIGNIFICANT CHANGE UP (ref 22–31)
CREAT SERPL-MCNC: 7.16 MG/DL — HIGH (ref 0.5–1.3)
CREAT SERPL-MCNC: 7.58 MG/DL — HIGH (ref 0.5–1.3)
CREAT SERPL-MCNC: 9.02 MG/DL — HIGH (ref 0.5–1.3)
DIALYSIS INSTRUMENT RESULT - HEPATITIS B SURFACE ANTIGEN: NEGATIVE — SIGNIFICANT CHANGE UP
EGFR: 6 ML/MIN/1.73M2 — LOW
EGFR: 7 ML/MIN/1.73M2 — LOW
EGFR: 8 ML/MIN/1.73M2 — LOW
EOSINOPHIL # BLD AUTO: 0.03 K/UL — SIGNIFICANT CHANGE UP (ref 0–0.5)
EOSINOPHIL # BLD AUTO: 0.05 K/UL — SIGNIFICANT CHANGE UP (ref 0–0.5)
EOSINOPHIL NFR BLD AUTO: 0.2 % — SIGNIFICANT CHANGE UP (ref 0–6)
EOSINOPHIL NFR BLD AUTO: 0.5 % — SIGNIFICANT CHANGE UP (ref 0–6)
FOLATE SERPL-MCNC: >20 NG/ML — HIGH (ref 3.1–17.5)
GAS PNL BLDV: 131 MMOL/L — LOW (ref 136–145)
GAS PNL BLDV: SIGNIFICANT CHANGE UP
GLUCOSE BLDC GLUCOMTR-MCNC: 102 MG/DL — HIGH (ref 70–99)
GLUCOSE BLDC GLUCOMTR-MCNC: 118 MG/DL — HIGH (ref 70–99)
GLUCOSE BLDC GLUCOMTR-MCNC: 127 MG/DL — HIGH (ref 70–99)
GLUCOSE BLDC GLUCOMTR-MCNC: 173 MG/DL — HIGH (ref 70–99)
GLUCOSE BLDC GLUCOMTR-MCNC: 196 MG/DL — HIGH (ref 70–99)
GLUCOSE BLDC GLUCOMTR-MCNC: 76 MG/DL — SIGNIFICANT CHANGE UP (ref 70–99)
GLUCOSE BLDC GLUCOMTR-MCNC: 95 MG/DL — SIGNIFICANT CHANGE UP (ref 70–99)
GLUCOSE BLDV-MCNC: 121 MG/DL — HIGH (ref 70–99)
GLUCOSE SERPL-MCNC: 111 MG/DL — HIGH (ref 70–99)
GLUCOSE SERPL-MCNC: 117 MG/DL — HIGH (ref 70–99)
GLUCOSE SERPL-MCNC: 98 MG/DL — SIGNIFICANT CHANGE UP (ref 70–99)
HCO3 BLDV-SCNC: 29 MMOL/L — SIGNIFICANT CHANGE UP (ref 22–29)
HCT VFR BLD CALC: 40.9 % — SIGNIFICANT CHANGE UP (ref 39–50)
HCT VFR BLD CALC: 40.9 % — SIGNIFICANT CHANGE UP (ref 39–50)
HCT VFR BLDA CALC: 40 % — SIGNIFICANT CHANGE UP (ref 39–51)
HEMOGLOBIN INTERPRETATION: SIGNIFICANT CHANGE UP
HGB A MFR BLD: 94.9 % — LOW (ref 95–97.6)
HGB A2 MFR BLD: 3.9 % — HIGH (ref 2.4–3.5)
HGB BLD CALC-MCNC: 13.2 G/DL — SIGNIFICANT CHANGE UP (ref 12.6–17.4)
HGB BLD-MCNC: 13.1 G/DL — SIGNIFICANT CHANGE UP (ref 13–17)
HGB BLD-MCNC: 13.2 G/DL — SIGNIFICANT CHANGE UP (ref 13–17)
HGB F MFR BLD: 1.2 % — SIGNIFICANT CHANGE UP (ref 0–1.5)
IANC: 11.35 K/UL — HIGH (ref 1.8–7.4)
IANC: 7.48 K/UL — HIGH (ref 1.8–7.4)
IMM GRANULOCYTES NFR BLD AUTO: 0.4 % — SIGNIFICANT CHANGE UP (ref 0–0.9)
IMM GRANULOCYTES NFR BLD AUTO: 0.5 % — SIGNIFICANT CHANGE UP (ref 0–0.9)
IRON SATN MFR SERPL: 47 UG/DL — SIGNIFICANT CHANGE UP (ref 45–165)
LACTATE BLDV-MCNC: 1.7 MMOL/L — SIGNIFICANT CHANGE UP (ref 0.5–2)
LYMPHOCYTES # BLD AUTO: 1.01 K/UL — SIGNIFICANT CHANGE UP (ref 1–3.3)
LYMPHOCYTES # BLD AUTO: 1.36 K/UL — SIGNIFICANT CHANGE UP (ref 1–3.3)
LYMPHOCYTES # BLD AUTO: 14.1 % — SIGNIFICANT CHANGE UP (ref 13–44)
LYMPHOCYTES # BLD AUTO: 7.6 % — LOW (ref 13–44)
MAGNESIUM SERPL-MCNC: 2.7 MG/DL — HIGH (ref 1.6–2.6)
MAGNESIUM SERPL-MCNC: 2.9 MG/DL — HIGH (ref 1.6–2.6)
MAGNESIUM SERPL-MCNC: 3 MG/DL — HIGH (ref 1.6–2.6)
MCHC RBC-ENTMCNC: 20.3 PG — LOW (ref 27–34)
MCHC RBC-ENTMCNC: 20.5 PG — LOW (ref 27–34)
MCHC RBC-ENTMCNC: 32 GM/DL — SIGNIFICANT CHANGE UP (ref 32–36)
MCHC RBC-ENTMCNC: 32.3 GM/DL — SIGNIFICANT CHANGE UP (ref 32–36)
MCV RBC AUTO: 63.4 FL — LOW (ref 80–100)
MCV RBC AUTO: 63.5 FL — LOW (ref 80–100)
MONOCYTES # BLD AUTO: 0.67 K/UL — SIGNIFICANT CHANGE UP (ref 0–0.9)
MONOCYTES # BLD AUTO: 0.84 K/UL — SIGNIFICANT CHANGE UP (ref 0–0.9)
MONOCYTES NFR BLD AUTO: 6.3 % — SIGNIFICANT CHANGE UP (ref 2–14)
MONOCYTES NFR BLD AUTO: 7 % — SIGNIFICANT CHANGE UP (ref 2–14)
NEUTROPHILS # BLD AUTO: 11.35 K/UL — HIGH (ref 1.8–7.4)
NEUTROPHILS # BLD AUTO: 7.48 K/UL — HIGH (ref 1.8–7.4)
NEUTROPHILS NFR BLD AUTO: 77.6 % — HIGH (ref 43–77)
NEUTROPHILS NFR BLD AUTO: 85.2 % — HIGH (ref 43–77)
NRBC # BLD: 0 /100 WBCS — SIGNIFICANT CHANGE UP (ref 0–0)
NRBC # BLD: 0 /100 WBCS — SIGNIFICANT CHANGE UP (ref 0–0)
NRBC # FLD: 0.03 K/UL — HIGH (ref 0–0)
NRBC # FLD: 0.05 K/UL — HIGH (ref 0–0)
PCO2 BLDV: 51 MMHG — SIGNIFICANT CHANGE UP (ref 42–55)
PH BLDV: 7.36 — SIGNIFICANT CHANGE UP (ref 7.32–7.43)
PHOSPHATE SERPL-MCNC: 5.8 MG/DL — HIGH (ref 2.5–4.5)
PHOSPHATE SERPL-MCNC: 6 MG/DL — HIGH (ref 2.5–4.5)
PHOSPHATE SERPL-MCNC: 7.3 MG/DL — HIGH (ref 2.5–4.5)
PLATELET # BLD AUTO: 114 K/UL — LOW (ref 150–400)
PLATELET # BLD AUTO: 134 K/UL — LOW (ref 150–400)
PO2 BLDV: 27 MMHG — SIGNIFICANT CHANGE UP (ref 25–45)
POTASSIUM BLDV-SCNC: 5.3 MMOL/L — HIGH (ref 3.5–5.1)
POTASSIUM SERPL-MCNC: 5.2 MMOL/L — SIGNIFICANT CHANGE UP (ref 3.5–5.3)
POTASSIUM SERPL-MCNC: 5.5 MMOL/L — HIGH (ref 3.5–5.3)
POTASSIUM SERPL-MCNC: 6 MMOL/L — HIGH (ref 3.5–5.3)
POTASSIUM SERPL-SCNC: 5.2 MMOL/L — SIGNIFICANT CHANGE UP (ref 3.5–5.3)
POTASSIUM SERPL-SCNC: 5.5 MMOL/L — HIGH (ref 3.5–5.3)
POTASSIUM SERPL-SCNC: 6 MMOL/L — HIGH (ref 3.5–5.3)
PROT SERPL-MCNC: 8 G/DL — SIGNIFICANT CHANGE UP (ref 6–8.3)
RBC # BLD: 6.44 M/UL — HIGH (ref 4.2–5.8)
RBC # BLD: 6.45 M/UL — HIGH (ref 4.2–5.8)
RBC # FLD: 21.7 % — HIGH (ref 10.3–14.5)
RBC # FLD: 21.9 % — HIGH (ref 10.3–14.5)
SAO2 % BLDV: 31.1 % — LOW (ref 67–88)
SODIUM SERPL-SCNC: 135 MMOL/L — SIGNIFICANT CHANGE UP (ref 135–145)
SODIUM SERPL-SCNC: 136 MMOL/L — SIGNIFICANT CHANGE UP (ref 135–145)
SODIUM SERPL-SCNC: 136 MMOL/L — SIGNIFICANT CHANGE UP (ref 135–145)
VIT B12 SERPL-MCNC: 715 PG/ML — SIGNIFICANT CHANGE UP (ref 200–900)
WBC # BLD: 13.32 K/UL — HIGH (ref 3.8–10.5)
WBC # BLD: 9.64 K/UL — SIGNIFICANT CHANGE UP (ref 3.8–10.5)
WBC # FLD AUTO: 13.32 K/UL — HIGH (ref 3.8–10.5)
WBC # FLD AUTO: 9.64 K/UL — SIGNIFICANT CHANGE UP (ref 3.8–10.5)

## 2024-05-01 PROCEDURE — 70450 CT HEAD/BRAIN W/O DYE: CPT | Mod: 26

## 2024-05-01 PROCEDURE — 36556 INSERT NON-TUNNEL CV CATH: CPT | Mod: RT

## 2024-05-01 RX ORDER — SODIUM ZIRCONIUM CYCLOSILICATE 10 G/10G
10 POWDER, FOR SUSPENSION ORAL ONCE
Refills: 0 | Status: COMPLETED | OUTPATIENT
Start: 2024-05-01 | End: 2024-05-01

## 2024-05-01 RX ORDER — HYDRALAZINE HCL 50 MG
10 TABLET ORAL ONCE
Refills: 0 | Status: DISCONTINUED | OUTPATIENT
Start: 2024-05-01 | End: 2024-05-02

## 2024-05-01 RX ORDER — DEXTROSE 50 % IN WATER 50 %
25 SYRINGE (ML) INTRAVENOUS ONCE
Refills: 0 | Status: COMPLETED | OUTPATIENT
Start: 2024-05-01 | End: 2024-05-01

## 2024-05-01 RX ORDER — DEXTROSE 50 % IN WATER 50 %
50 SYRINGE (ML) INTRAVENOUS ONCE
Refills: 0 | Status: DISCONTINUED | OUTPATIENT
Start: 2024-05-01 | End: 2024-05-01

## 2024-05-01 RX ORDER — DEXTROSE 50 % IN WATER 50 %
50 SYRINGE (ML) INTRAVENOUS ONCE
Refills: 0 | Status: COMPLETED | OUTPATIENT
Start: 2024-05-01 | End: 2024-05-01

## 2024-05-01 RX ORDER — DEXTROSE 50 % IN WATER 50 %
50 SYRINGE (ML) INTRAVENOUS ONCE
Refills: 0 | Status: DISCONTINUED | OUTPATIENT
Start: 2024-05-01 | End: 2024-05-03

## 2024-05-01 RX ORDER — CHLORHEXIDINE GLUCONATE 213 G/1000ML
1 SOLUTION TOPICAL
Refills: 0 | Status: DISCONTINUED | OUTPATIENT
Start: 2024-05-01 | End: 2024-06-14

## 2024-05-01 RX ORDER — INSULIN HUMAN 100 [IU]/ML
5 INJECTION, SOLUTION SUBCUTANEOUS ONCE
Refills: 0 | Status: DISCONTINUED | OUTPATIENT
Start: 2024-05-01 | End: 2024-05-02

## 2024-05-01 RX ORDER — INSULIN HUMAN 100 [IU]/ML
5 INJECTION, SOLUTION SUBCUTANEOUS ONCE
Refills: 0 | Status: COMPLETED | OUTPATIENT
Start: 2024-05-01 | End: 2024-05-01

## 2024-05-01 RX ADMIN — Medication 2 UNIT(S): at 08:44

## 2024-05-01 RX ADMIN — CARVEDILOL PHOSPHATE 12.5 MILLIGRAM(S): 80 CAPSULE, EXTENDED RELEASE ORAL at 05:57

## 2024-05-01 RX ADMIN — Medication 10 MILLIGRAM(S): at 05:58

## 2024-05-01 RX ADMIN — Medication 100 MILLIGRAM(S): at 05:57

## 2024-05-01 RX ADMIN — Medication 25 MILLILITER(S): at 12:00

## 2024-05-01 RX ADMIN — INSULIN HUMAN 5 UNIT(S): 100 INJECTION, SOLUTION SUBCUTANEOUS at 08:44

## 2024-05-01 RX ADMIN — Medication 50 MILLILITER(S): at 08:41

## 2024-05-01 RX ADMIN — HEPARIN SODIUM 5000 UNIT(S): 5000 INJECTION INTRAVENOUS; SUBCUTANEOUS at 05:57

## 2024-05-01 RX ADMIN — Medication 60 MILLIGRAM(S): at 05:57

## 2024-05-01 NOTE — PROGRESS NOTE ADULT - SUBJECTIVE AND OBJECTIVE BOX
Patient is a 73y Male     Patient is a 73y old  Male who presents with a chief complaint of Unstable angina, abn EKG (01 May 2024 07:53)      HPI:  73-year-old male past medical history hypertension, ESRD on dialysis Monday Wednesday Friday, diabetes insulin-dependent presents with hiccups patient endorses persistent hiccups for the last 2 days. found to have peaked T waves, a new finding. denies dizziness, N/V, denies CP, palps, abd pain (29 Apr 2024 21:15)      PAST MEDICAL & SURGICAL HISTORY:  Diabetes      Benign essential HTN      HLD (hyperlipidemia)      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      ESRD on hemodialysis      Arteriovenous fistula          MEDICATIONS  (STANDING):  atorvastatin 20 milliGRAM(s) Oral at bedtime  baclofen 10 milliGRAM(s) Oral <User Schedule>  calcium acetate 667 milliGRAM(s) Oral three times a day with meals  carvedilol 12.5 milliGRAM(s) Oral every 12 hours  dextrose 10% Bolus 125 milliLiter(s) IV Bolus once  dextrose 5%. 1000 milliLiter(s) (50 mL/Hr) IV Continuous <Continuous>  dextrose 5%. 1000 milliLiter(s) (100 mL/Hr) IV Continuous <Continuous>  dextrose 50% Injectable 50 milliLiter(s) IV Push once  dextrose 50% Injectable 12.5 Gram(s) IV Push once  dextrose 50% Injectable 25 Gram(s) IV Push once  epoetin reilly (EPOGEN) Injectable 02962 Unit(s) IV Push <User Schedule>  glucagon  Injectable 1 milliGRAM(s) IntraMuscular once  heparin   Injectable 5000 Unit(s) SubCutaneous every 12 hours  hydrALAZINE 100 milliGRAM(s) Oral three times a day  insulin glargine Injectable (LANTUS) 6 Unit(s) SubCutaneous at bedtime  insulin lispro (ADMELOG) corrective regimen sliding scale   SubCutaneous three times a day before meals  insulin lispro Injectable (ADMELOG) 2 Unit(s) SubCutaneous three times a day before meals  insulin regular  human recombinant 5 Unit(s) IV Push once  metoclopramide 10 milliGRAM(s) Oral three times a day before meals  NIFEdipine XL 60 milliGRAM(s) Oral two times a day      Allergies    No Known Allergies    Intolerances        SOCIAL HISTORY:  Denies ETOh,Smoking,     FAMILY HISTORY:  FHx: diabetes mellitus (Father, Aunt)        REVIEW OF SYSTEMS:    CONSTITUTIONAL: No weakness, fevers or chills  EYES/ENT: No visual changes;  No vertigo or throat pain   NECK: No pain or stiffness  RESPIRATORY: No cough, wheezing, hemoptysis; No shortness of breath  CARDIOVASCULAR: No chest pain or palpitations  GASTROINTESTINAL: No abdominal or epigastric pain. No nausea, vomiting, or hematemesis; No diarrhea or constipation. No melena or hematochezia.  GENITOURINARY: No dysuria, frequency or hematuria  NEUROLOGICAL: No numbness or weakness  SKIN: No itching, burning, rashes, or lesions   All other review of systems is negative unless indicated above.    VITAL:  T(C): , Max: 36.9 (04-30-24 @ 21:45)  T(F): , Max: 98.4 (04-30-24 @ 21:45)  HR: 58 (05-01-24 @ 06:00)  BP: 153/66 (05-01-24 @ 06:00)  BP(mean): --  RR: 20 (05-01-24 @ 06:00)  SpO2: 98% (05-01-24 @ 06:00)  Wt(kg): --    I and O's:        PHYSICAL EXAM:    Constitutional: NAD  HEENT: PERRLA,   Neck: No JVD  Respiratory: CTA B/L  Cardiovascular: S1 and S2  Gastrointestinal: BS+, soft, NT/ND  Extremities: No peripheral edema  Neurological: A/O x 3, no focal deficits  Psychiatric: Normal mood, normal affect  : No Abbasi  Skin: No rashes  Access: Not applicable  Back: No CVA tenderness    LABS:                        13.1   9.64  )-----------( 114      ( 01 May 2024 05:48 )             40.9     05-01    135  |  89<L>  |  61<H>  ----------------------------<  111<H>  6.0<H>   |  26  |  9.02<H>    Ca    8.9      01 May 2024 05:48  Phos  7.3     05-01  Mg     3.00     05-01    TPro  7.8  /  Alb  4.8  /  TBili  0.7  /  DBili  x   /  AST  17  /  ALT  10  /  AlkPhos  92  04-29          RADIOLOGY & ADDITIONAL STUDIES:

## 2024-05-01 NOTE — DIETITIAN INITIAL EVALUATION ADULT - REASON
Unable to get consent, however noted w/ overt signs of severe muscle loss in shoulder region per observation.

## 2024-05-01 NOTE — PROGRESS NOTE ADULT - SUBJECTIVE AND OBJECTIVE BOX
Cardiovascular Disease Progress Note  DATE OF SERVICE: 05-01-24 @ 07:53    Overnight events: No acute events overnight.    The patient is in no distress.  He does not want to engage in conversation and pulls the blanket over his head.     Objective Findings:  T(C): 36.9 (05-01-24 @ 06:00), Max: 36.9 (04-30-24 @ 21:45)  HR: 58 (05-01-24 @ 06:00) (52 - 65)  BP: 153/66 (05-01-24 @ 06:00) (132/50 - 165/56)  RR: 20 (05-01-24 @ 06:00) (16 - 20)  SpO2: 98% (05-01-24 @ 06:00) (97% - 100%)  Wt(kg): --  Daily     Daily       Physical Exam:  Gen: NAD; Patient resting comfortably  HEENT: EOMI, Normocephalic/ atraumatic  CV: IIR, normal S1 + S2, no m/r/g  Lungs:  Normal respiratory effort; clear to auscultation bilaterally  Abd: soft, non-tender; bowel sounds present  Ext: No edema; warm and well perfused    Telemetry: unable to interpret; motion artifact    Laboratory Data:                        13.1   9.64  )-----------( 114      ( 01 May 2024 05:48 )             40.9     05-01    135  |  89<L>  |  61<H>  ----------------------------<  111<H>  6.0<H>   |  26  |  9.02<H>    Ca    8.9      01 May 2024 05:48  Phos  7.3     05-01  Mg     3.00     05-01    TPro  7.8  /  Alb  4.8  /  TBili  0.7  /  DBili  x   /  AST  17  /  ALT  10  /  AlkPhos  92  04-29    PT/INR - ( 29 Apr 2024 16:19 )   PT: 11.9 sec;   INR: 1.06 ratio         PTT - ( 29 Apr 2024 16:19 )  PTT:32.2 sec  CARDIAC MARKERS ( 29 Apr 2024 16:19 )  x     / x     / 50 U/L / x     / 1.7 ng/mL          Inpatient Medications:  MEDICATIONS  (STANDING):  atorvastatin 20 milliGRAM(s) Oral at bedtime  baclofen 10 milliGRAM(s) Oral <User Schedule>  calcium acetate 667 milliGRAM(s) Oral three times a day with meals  carvedilol 12.5 milliGRAM(s) Oral every 12 hours  dextrose 10% Bolus 125 milliLiter(s) IV Bolus once  dextrose 5%. 1000 milliLiter(s) (100 mL/Hr) IV Continuous <Continuous>  dextrose 5%. 1000 milliLiter(s) (50 mL/Hr) IV Continuous <Continuous>  dextrose 50% Injectable 50 milliLiter(s) IV Push once  dextrose 50% Injectable 25 Gram(s) IV Push once  dextrose 50% Injectable 12.5 Gram(s) IV Push once  epoetin reilly (EPOGEN) Injectable 07703 Unit(s) IV Push <User Schedule>  glucagon  Injectable 1 milliGRAM(s) IntraMuscular once  heparin   Injectable 5000 Unit(s) SubCutaneous every 12 hours  hydrALAZINE 100 milliGRAM(s) Oral three times a day  insulin glargine Injectable (LANTUS) 6 Unit(s) SubCutaneous at bedtime  insulin lispro (ADMELOG) corrective regimen sliding scale   SubCutaneous three times a day before meals  insulin lispro Injectable (ADMELOG) 2 Unit(s) SubCutaneous three times a day before meals  insulin regular  human recombinant 5 Unit(s) IV Push once  metoclopramide 10 milliGRAM(s) Oral three times a day before meals  NIFEdipine XL 60 milliGRAM(s) Oral two times a day      Assessment:  73 year old man with HTN, HLD, T2DM on insulin, and ESRD on HD presents with supply demand ischemia and angina.    Plan of Care:    #Angina-  Patient with atypical symptoms, however multiple risk factors for CAD.   Of note, he was admitted earlier this year with supply demand ischemia.  Plan for nuclear stress test once hyperkalemia is corrected.     #Sinus bradycardia-  No evidence of AV block on admission EKG or telemetry.  No indication for pacing at this time.     #HTN-  BP controlled this morning.    #ESRD-  HD as per renal.     #ACP (advance care planning)-  Advanced care planning was discussed with the patient.  Advance care planning forms were discussed. Risks, benefits and alternatives of medical treatment and procedures were discussed in detail and all questions were answered.   30 additional minutes spent addressing advance care plans.    High and complex medical decision making in this patient with active comorbidities.           Over 75 minutes spent on total encounter; more than 50% of the visit was spent counseling and/or coordinating care by the attending physician.      Geovani Bravo MD [facility]  Cardiovascular Disease  [phone]

## 2024-05-01 NOTE — DIETITIAN INITIAL EVALUATION ADULT - PERTINENT MEDS FT
MEDICATIONS  (STANDING):  atorvastatin 20 milliGRAM(s) Oral at bedtime  baclofen 10 milliGRAM(s) Oral <User Schedule>  calcium acetate 667 milliGRAM(s) Oral three times a day with meals  carvedilol 12.5 milliGRAM(s) Oral every 12 hours  dextrose 10% Bolus 125 milliLiter(s) IV Bolus once  dextrose 5%. 1000 milliLiter(s) (100 mL/Hr) IV Continuous <Continuous>  dextrose 5%. 1000 milliLiter(s) (50 mL/Hr) IV Continuous <Continuous>  dextrose 50% Injectable 12.5 Gram(s) IV Push once  dextrose 50% Injectable 25 Gram(s) IV Push once  epoetin reilly (EPOGEN) Injectable 44653 Unit(s) IV Push <User Schedule>  glucagon  Injectable 1 milliGRAM(s) IntraMuscular once  heparin   Injectable 5000 Unit(s) SubCutaneous every 12 hours  hydrALAZINE 100 milliGRAM(s) Oral three times a day  insulin glargine Injectable (LANTUS) 6 Unit(s) SubCutaneous at bedtime  insulin lispro (ADMELOG) corrective regimen sliding scale   SubCutaneous three times a day before meals  insulin lispro Injectable (ADMELOG) 2 Unit(s) SubCutaneous three times a day before meals  metoclopramide 10 milliGRAM(s) Oral three times a day before meals  NIFEdipine XL 60 milliGRAM(s) Oral two times a day    MEDICATIONS  (PRN):  dextrose Oral Gel 15 Gram(s) Oral once PRN Blood Glucose LESS THAN 70 milliGRAM(s)/deciliter

## 2024-05-01 NOTE — CHART NOTE - NSCHARTNOTEFT_GEN_A_CORE
RRT called by RN on 7N due to c/f increased Lethargy/ AMS. Spouse and daughter at bedside. Spouse and daughter concerned about patient's lethargy/altered mental status as they say this is not his baseline. Per chart review and writer's exam of patient earlier this am, pt has been alert but not interactive throughout admission with any specialists today. In addition, per RN Documentation 4/30 evening - pt neurological status varying between A&OX1 and A&Ox 0. PT was admitted for chest pain and Hiccups. Writer presented to bedside during RRT -  On exam, PERRL, initially not following commands, but moving all extremities, no facial droop noted. FSG >100. Pt with thick secretions, suctioned. By end of rapid, pt following commands, moving all extremities. PT with elevated BP at rapid likely 2/2 pt maurilio arm, repeat BP 180s/100s.  Per eMar, pt was given Haldol 1 mg IVP ~ 2200 4/30 and Baclofen ~ 6 am 5/1. Discussed with Dr. Chaitanya Farrell, possible that lethargy is from baclofen and or Haldol. OF note, pt with hyperkalemia on AM labs. Went to HD however was only able to tolerate 1 hr of treatment due to pt moving/disorientation which subsequently led to infiltration of HD needle. Per Nephro, plan to repeat BMP at 1600 to determine further need for HD this evening vs tomorrow morning. K still elevated to 5.5 on repeat BMP - Plan for Lokelma 10 g q4h apart x2 and plan for HD this evening. Per discussion with Dr. Chaitanya Farrell, will obtain CT Head.     Sophie Keating NP   Department of Medicine, [facility]  P. [phone] RRT called by RN on 7N due to c/f increased Lethargy/ AMS. Spouse and daughter at bedside. Spouse and daughter concerned about patient's lethargy/altered mental status as they say this is not his baseline. Per chart review and writer's exam of patient earlier this am, pt has been alert but not interactive throughout admission with any specialists today. In addition, per RN Documentation 4/30 evening - pt neurological status varying between A&OX1 and A&Ox 0. PT was admitted for chest pain and Hiccups. Writer presented to bedside during RRT - On exam, PERRL, initially not following commands, but moving all extremities, no facial droop noted. FSG >100. Pt with thick secretions, suctioned. By end of rapid, pt following commands, moving all extremities. PT with elevated BP at rapid likely 2/2 pt maurilio arm, repeat BP 180s/100s. Per eMar, pt was given Haldol 1 mg IVP ~ 2200 4/30 and Baclofen ~ 6 am 5/1. Discussed with Dr. Chaitanya Farrell, possible that lethargy is from baclofen and or Haldol. Will discontinue Baclofen at present. OF note, pt with hyperkalemia on AM labs. Went to HD however was only able to tolerate 1 hr of treatment due to pt moving/disorientation which subsequently led to infiltration of HD needle. Per Nephro, plan to repeat BMP at 1600 to determine further need for HD this evening vs tomorrow morning. K still elevated to 5.5 on repeat BMP - Plan for Lokelma 10 g q4h apart x2 and plan for HD tomorrow AM. Per discussion with Dr. Chaitanya Farrell, will obtain CT Head.     Sophie Keating NP   Department of Medicine, [facility]  [phone] RRT called by RN on 7N due to c/f increased Lethargy/ AMS. Spouse and daughter at bedside. Spouse and daughter concerned about patient's lethargy/altered mental status as they say this is not his baseline. Per chart review and writer's exam of patient earlier this am, pt has been alert but not interactive throughout admission with any specialists today. In addition, per RN Documentation 4/30 evening - pt neurological status varying between A&OX1 and A&Ox 0. PT was admitted for chest pain and Hiccups. Writer presented to bedside during RRT - On exam, PERRL, initially not following commands, but moving all extremities, no facial droop noted. FSG >100. Pt with thick secretions, suctioned. By end of rapid, pt following commands, moving all extremities. PT with elevated BP at rapid likely 2/2 pt maurilio arm, repeat BP 180s/100s. Per eMar, pt was given Haldol 1 mg IVP ~ 2200 4/30 and Baclofen ~ 6 am 5/1. Discussed with Dr. Chaitanya Farrell, possible that lethargy is from baclofen and or Haldol. Will discontinue Baclofen at present. OF note, pt with hyperkalemia on AM labs. Went to HD however was only able to tolerate 1 hr of treatment due to pt moving/disorientation which subsequently led to infiltration of HD needle. Per Nephro, plan to repeat BMP at 1600 to determine further need for HD this evening vs tomorrow morning. K still elevated to 5.5 on repeat BMP - Plan for Lokelma 10 g q4h apart x2 and plan for HD this evening. Per discussion with Dr. Chaitanya Farrell, will obtain CT Head.     Sophie Keating NP   Department of Medicine, [facility]  P. [phone]

## 2024-05-01 NOTE — CHART NOTE - NSCHARTNOTEFT_GEN_A_CORE
Overnight Medicine ACP Coverage    Late Entry    RRT called for Lethargy/AMS. Patient was RRT during the day for lethargy. At conclusion of initial rapid pt was following commands, moving all extremities. Patient currently A&Ox0, protecting airway, currently on RA.    During rapid Wife Pauline was called for collateral. Patients baseline A&Ox2, able to walk and hold conversation. Patient was at baseline on Monday (4/30/24). Wife went home and when returning in the morning patient was described by wife to be very sleepy and unable to be woken up patient was scheduled for early HD so wife went home. Wife then returned to the hospital around 4-5pm in the afternoon this was when patient arrived to 7N. As per wife she noticed his eyes were rolled back to head and shaking. RRT was called. See RRT note from 5/1/24. CT head was done after initial rapid and showed No acute intracranial hemorrhage or acute territorial infarction.     GOC discussion: Wife wishes patient to be FULL CODE.    Neurology was consulted during second RRT. Recommend 2 hour EEG. Will keep patient on      Discussed with Dr. Hayes after second rapid. Neurology consulted and recommend to order BCx2. ID will be c/s in the Morning.     Bowen Boudreaux  Department of Medicine   [facility]  [id number] Overnight Medicine ACP Coverage    Late Entry    RRT called for Lethargy/AMS. Patient was RRT during the day for lethargy. At conclusion of initial rapid pt was following commands, moving all extremities. Patient currently A&Ox0, responsive to sternal rub, protecting airway, currently on RA.    VS on arrival /89, HR 81, RR 16, Temp 99.6 rectal, 97% on RA and      During rapid Wife Pauline was called for collateral. Patients baseline A&Ox2, able to walk and hold conversation. Patient was at baseline on Monday (4/30/24). Wife went home and when returning in the morning patient was described by wife to be very sleepy and unable to be woken up patient was scheduled for early HD so wife went home. Wife then returned to the hospital around 4-5pm in the afternoon this was when patient arrived to 7N. As per wife she noticed his eyes were rolled back to head and shaking. RRT was called. See RRT note from 5/1/24. CT head was done after initial rapid and showed No acute intracranial hemorrhage or acute territorial infarction.     GOC discussion: Wife wishes patient to be FULL CODE.    Neurology was consulted during second RRT. Recommend 2 hour EEG. Will keep patient on .    Druing Rapid labsw were drawn. IV hydral 10 given, Regular insulin 5 units and 1/2 amp given for shifting of potassium given previous labs show K of 5.6.  -labs reviewed.   WBC 13.31  Lactate 1.7  pCO2 51  BUN 47,   K 5.2,    Plan of HD tonight via Right femoral shiley placed by vascular bedside. Neurology consulted during rapid.   Discussed with Dr. Hayes after second rapid given leukocytosis recommend to order BCx2. ID will be c/s in the Morning.     Bowen Boudreaux  Department of Medicine   [facility]  [id number] Overnight Medicine ACP Coverage    Late Entry    RRT called for Lethargy/AMS. Patient was RRT during the day for lethargy. At conclusion of initial rapid pt was following commands, moving all extremities. Patient currently A&Ox0, responsive to sternal rub, protecting airway, currently on RA.    VS on arrival /89, HR 81, RR 16, Temp 99.6 rectal, 97% on RA and      During rapid Wife Pauline was called for collateral. Patients baseline A&Ox2, able to walk and hold conversation. Patient was at baseline on Monday (4/30/24). Wife went home and when returning in the morning patient was described by wife to be very sleepy and unable to be woken up patient was scheduled for early HD so wife went home. Wife then returned to the hospital around 4-5pm in the afternoon this was when patient arrived to 7N. As per wife she noticed his eyes were rolled back to head and shaking. RRT was called. See RRT note from 5/1/24. CT head was done after initial rapid and showed No acute intracranial hemorrhage or acute territorial infarction.     GOC discussion: Wife wishes patient to be FULL CODE.    Neurology was consulted during second RRT. Recommend 2 hour EEG. Will keep patient on . Can hold off and AED at this time.     During Rapid labs were drawn. IV hydral 10 given, Regular insulin 5 units and 1/2 amp given for shifting of potassium given previous labs show K of 5.6.  -labs reviewed.   WBC 13.31  Lactate 1.7  pCO2 51  BUN 47   K 5.2    Plan of HD tonight via Right femoral shiley placed by vascular bedside. Neurology consulted during rapid.   Discussed with Dr. Hayes after second rapid given leukocytosis recommend to order BCx2. ID will be c/s in the Morning.     Bowen Boudreaux  Department of Medicine   [facility]  [id number]

## 2024-05-01 NOTE — DIETITIAN NUTRITION RISK NOTIFICATION - TREATMENT: THE FOLLOWING DIET HAS BEEN RECOMMENDED
Diet, Renal Restrictions:   For patients receiving Renal Replacement - No Protein Restr, No Conc K, No Conc Phos, Low Sodium  Consistent Carbohydrate {Evening Snack} (CSTCHOSN)  Caffeine Free (NOCAFF) (04-30-24 @ 16:31) [Active]

## 2024-05-01 NOTE — PROGRESS NOTE ADULT - ASSESSMENT
73-year-old male past medical history hypertension, ESRD on dialysis Monday Wednesday Friday, diabetes insulin-dependent presents with hiccups patient endorses persistent hiccups for the last 2 days. found to have peaked T waves, a new finding. denies dizziness, N/V, denies CP, palps, abd pain    AMS today, received haldol last night and Baclofen, will dc baclofen. head CT neg. Neuro called, needs HD. has hyperkalemia. BP was elevated 2/2 needing HD. renal and card following. awaiting ischemic study. hiccups resolved, on reglan 2/2 Gastroparesis/gastric distention  cont outptn meds  CT C/A/P: distended stomach, prob 2/2 gastroparesis, seen by GI. cont Reglan 10 mg prior to meals   renal  and cardiology following  check TTE  cont outptn meds  dvt ppx w HSC

## 2024-05-01 NOTE — PROGRESS NOTE ADULT - SUBJECTIVE AND OBJECTIVE BOX
Patient is a 73y old  Male who presents with a chief complaint of Other disorders of electrolyte and fluid balance, not elsewhere classified     (01 May 2024 11:06)      SUBJECTIVE / OVERNIGHT EVENTS: AMS today, received haldol last night and Baclofen, will dc baclofen. head CT neg. Neuro called, needs HD. has hyperkalemia. BP was elevated 2/2 needing HD. renal and card following. awaiting ischemic study. hiccups resolved, on reglan 2/2 Gastroparesis/gastric distention    MEDICATIONS  (STANDING):  atorvastatin 20 milliGRAM(s) Oral at bedtime  calcium acetate 667 milliGRAM(s) Oral three times a day with meals  carvedilol 12.5 milliGRAM(s) Oral every 12 hours  chlorhexidine 2% Cloths 1 Application(s) Topical <User Schedule>  dextrose 10% Bolus 125 milliLiter(s) IV Bolus once  dextrose 5%. 1000 milliLiter(s) (50 mL/Hr) IV Continuous <Continuous>  dextrose 5%. 1000 milliLiter(s) (100 mL/Hr) IV Continuous <Continuous>  dextrose 50% Injectable 12.5 Gram(s) IV Push once  dextrose 50% Injectable 25 Gram(s) IV Push once  dextrose 50% Injectable 50 milliLiter(s) IV Push once  epoetin reilly (EPOGEN) Injectable 37731 Unit(s) IV Push <User Schedule>  glucagon  Injectable 1 milliGRAM(s) IntraMuscular once  heparin   Injectable 5000 Unit(s) SubCutaneous every 12 hours  hydrALAZINE 100 milliGRAM(s) Oral three times a day  hydrALAZINE Injectable 10 milliGRAM(s) IV Push once  insulin glargine Injectable (LANTUS) 6 Unit(s) SubCutaneous at bedtime  insulin lispro (ADMELOG) corrective regimen sliding scale   SubCutaneous three times a day before meals  insulin lispro Injectable (ADMELOG) 2 Unit(s) SubCutaneous three times a day before meals  insulin regular  human recombinant 5 Unit(s) IV Push once  metoclopramide 10 milliGRAM(s) Oral three times a day before meals  NIFEdipine XL 60 milliGRAM(s) Oral two times a day  sodium zirconium cyclosilicate 10 Gram(s) Oral once    MEDICATIONS  (PRN):  dextrose Oral Gel 15 Gram(s) Oral once PRN Blood Glucose LESS THAN 70 milliGRAM(s)/deciliter      Vital Signs Last 24 Hrs  T(F): 98 (05-01-24 @ 20:32), Max: 98.4 (05-01-24 @ 06:00)  HR: 65 (05-01-24 @ 20:32) (56 - 70)  BP: 162/72 (05-01-24 @ 20:32) (129/98 - 199/69)  RR: 18 (05-01-24 @ 20:32) (18 - 20)  SpO2: 95% (05-01-24 @ 20:32) (94% - 98%)  Telemetry:   CAPILLARY BLOOD GLUCOSE      POCT Blood Glucose.: 127 mg/dL (01 May 2024 22:25)  POCT Blood Glucose.: 102 mg/dL (01 May 2024 16:13)  POCT Blood Glucose.: 196 mg/dL (01 May 2024 12:31)  POCT Blood Glucose.: 76 mg/dL (01 May 2024 12:01)  POCT Blood Glucose.: 95 mg/dL (01 May 2024 11:04)  POCT Blood Glucose.: 173 mg/dL (01 May 2024 10:09)  POCT Blood Glucose.: 118 mg/dL (01 May 2024 08:42)    I&O's Summary    01 May 2024 07:01  -  01 May 2024 23:32  --------------------------------------------------------  IN: 400 mL / OUT: 691 mL / NET: -291 mL        PHYSICAL EXAM:  GENERAL: NAD, well-developed  HEAD:  Atraumatic, Normocephalic  EYES: EOMI, PERRLA, conjunctiva and sclera clear  NECK: Supple, No JVD  CHEST/LUNG: Clear to auscultation bilaterally; No wheeze  HEART: Regular rate and rhythm; No murmurs, rubs, or gallops  ABDOMEN: Soft, Nontender, Nondistended; Bowel sounds present  EXTREMITIES:  2+ Peripheral Pulses, No clubbing, cyanosis, or edema  PSYCH: AAOx3  NEUROLOGY: non-focal  SKIN: No rashes or lesions    LABS:                        13.1   9.64  )-----------( 114      ( 01 May 2024 05:48 )             40.9     05-01    136  |  92<L>  |  x   ----------------------------<  x   5.2   |  x   |  x     Ca    9.6      01 May 2024 15:17  Phos  5.8     05-01  Mg     2.90     05-01            Urinalysis Basic - ( 01 May 2024 15:17 )    Color: x / Appearance: x / SG: x / pH: x  Gluc: 98 mg/dL / Ketone: x  / Bili: x / Urobili: x   Blood: x / Protein: x / Nitrite: x   Leuk Esterase: x / RBC: x / WBC x   Sq Epi: x / Non Sq Epi: x / Bacteria: x        RADIOLOGY & ADDITIONAL TESTS:    Imaging Personally Reviewed:    Consultant(s) Notes Reviewed:      Care Discussed with Consultants/Other Providers:

## 2024-05-01 NOTE — PROGRESS NOTE ADULT - ASSESSMENT
73-year-old male past medical history hypertension, ESRD on dialysis Monday Wednesday Friday, diabetes insulin-dependent presents with hiccups patient endorses persistent hiccups for the last 2 days. Nephrology consulted for HD needs.    A/P  ESRD:  Center: [facility]  Nephrologist: Dr. Hardwick  Access: R AVF  MWF schedule  HD TODAY  Consent obtained and placed in ED chart  Renal diet  Monitor BMP    Hyperkalemia  Lokelma 10gm today   HD TODAY--HD unit aware  Low K Diet  Monitor closely     HTN:  Fluctuating  Continue w/ meds  UF with HD  Monitor BP    Anemia:  Above goal  Monitor hb    CKD-MBD  PO4 elevated  c/w calcium acetate 667mg TID  Get PTH  Monitor Ca, PO4 daily      discussed with medical team and HD unit

## 2024-05-01 NOTE — RAPID RESPONSE TEAM SUMMARY - NSOTHERINTERVENTIONSRRT_GEN_ALL_CORE
RRT for AMS possibly 2/2 delirium vs toxic metabolic encephalopathy, however does not seem to be acutely altered, pt has not been interactive with any specialists today. By end of rapid, pt following commands, moving all extremities. If pt continues to be not oriented, recommend CTH. High BP at rapid likely 2/2 pt maurilio arm, repeat BP 180s/100s. Would repeat BP when pt more relaxed. Pt on baclofen but no other sedating medications and baclofen unlikely to be causing AMS. Hyperkalemia and hypoerphosphatemia normalizing after initial HD, would c/w HD if pt tolerates. Aspiration precautions.

## 2024-05-01 NOTE — DIETITIAN INITIAL EVALUATION ADULT - PERTINENT LABORATORY DATA
05-01    135  |  89<L>  |  61<H>  ----------------------------<  111<H>  6.0<H>   |  26  |  9.02<H>    Ca    8.9      01 May 2024 05:48  Phos  7.3     05-01  Mg     3.00     05-01    TPro  7.8  /  Alb  4.8  /  TBili  0.7  /  DBili  x   /  AST  17  /  ALT  10  /  AlkPhos  92  04-29  POCT Blood Glucose.: 173 mg/dL (05-01-24 @ 10:09)  A1C with Estimated Average Glucose Result: 6.9 % (04-30-24 @ 06:03)  A1C with Estimated Average Glucose Result: 14.0 % (01-15-24 @ 13:36)

## 2024-05-01 NOTE — DIETITIAN INITIAL EVALUATION ADULT - OTHER INFO
73 year old male with a PMH of hypertension, ESRD, diabetes presents with hiccups patient endorses persistent hiccups for the last 2 days per chart.    Patient is currently on renal/consistent carbohydrate diet. No GI distress noted. Currently on Reglan for likely gastroparesis per chart review. Has no food allergies. Per previous RD note (1/18) noted w/ weight 56.7 kg and noted w/ h/o poor appetite and weight loss. ABW is 52.6 kg (4/29/24) *drug dosing per chart indicating a -7.3% weight loss x 3 months. No edema or pressure injuries noted per RN flow sheet. Patient

## 2024-05-01 NOTE — DIETITIAN INITIAL EVALUATION ADULT - ORAL INTAKE PTA/DIET HISTORY
Patient seen for assessment. Patient asleep despite attempts to gain attention. Tried again later, but off unit for HD. Information obtained from chart review. Noted w/ A1c 6.9% per chart.

## 2024-05-01 NOTE — PROCEDURE NOTE - ADDITIONAL PROCEDURE DETAILS
Patient undergoing RRT, emergent right femoral venous shiley dialysis catheter placed under sterile ultrasound guided technique. Single stick, secured with sutures, dressing applied, may be used.

## 2024-05-01 NOTE — DIETITIAN INITIAL EVALUATION ADULT - NS FNS DIET ORDER
Diet, Renal Restrictions:   For patients receiving Renal Replacement - No Protein Restr, No Conc K, No Conc Phos, Low Sodium  Consistent Carbohydrate {Evening Snack} (CSTCHOSN)  Caffeine Free (NOCAFF) (04-30-24 @ 16:31)

## 2024-05-01 NOTE — PROVIDER CONTACT NOTE (OTHER) - ACTION/TREATMENT ORDERED:
Treatment ended and BMP drawing @4pm tonight. Result will determine to bring patient back for dialysis tonight or tomorrow.

## 2024-05-01 NOTE — PROVIDER CONTACT NOTE (MEDICATION) - BACKGROUND
73-year-old male past medical history hypertension, ESRD diabetes insulin-dependent presents with  persistent hiccups for the last 2 days.

## 2024-05-01 NOTE — PROGRESS NOTE ADULT - SUBJECTIVE AND OBJECTIVE BOX
[facility]   MD ELIANE BHAGAT MD RUORU WONG, PA    TEL:  FROM 9 AM to 5 PM ---OFFICE: [phone]  AVAILABLE ON TEAMS    FROM 5 PM - 9 AM PLEASE CALL ANSWERING SERVICE: [phone]    RENAL FOLLOW UP NOTE--Date of Service 05-01-24 @ 09:04  --------------------------------------------------------------------------------  HPI:      Pt seen and examined at bedside.       PAST HISTORY  --------------------------------------------------------------------------------  No significant changes to PMH, PSH, FHx, SHx, unless otherwise noted    ALLERGIES & MEDICATIONS  --------------------------------------------------------------------------------  Allergies    No Known Allergies    Intolerances      Standing Inpatient Medications  atorvastatin 20 milliGRAM(s) Oral at bedtime  baclofen 10 milliGRAM(s) Oral <User Schedule>  calcium acetate 667 milliGRAM(s) Oral three times a day with meals  carvedilol 12.5 milliGRAM(s) Oral every 12 hours  dextrose 10% Bolus 125 milliLiter(s) IV Bolus once  dextrose 5%. 1000 milliLiter(s) IV Continuous <Continuous>  dextrose 5%. 1000 milliLiter(s) IV Continuous <Continuous>  dextrose 50% Injectable 12.5 Gram(s) IV Push once  dextrose 50% Injectable 25 Gram(s) IV Push once  epoetin reilly (EPOGEN) Injectable 79880 Unit(s) IV Push <User Schedule>  glucagon  Injectable 1 milliGRAM(s) IntraMuscular once  heparin   Injectable 5000 Unit(s) SubCutaneous every 12 hours  hydrALAZINE 100 milliGRAM(s) Oral three times a day  insulin glargine Injectable (LANTUS) 6 Unit(s) SubCutaneous at bedtime  insulin lispro (ADMELOG) corrective regimen sliding scale   SubCutaneous three times a day before meals  insulin lispro Injectable (ADMELOG) 2 Unit(s) SubCutaneous three times a day before meals  metoclopramide 10 milliGRAM(s) Oral three times a day before meals  NIFEdipine XL 60 milliGRAM(s) Oral two times a day    PRN Inpatient Medications  dextrose Oral Gel 15 Gram(s) Oral once PRN      REVIEW OF SYSTEMS  --------------------------------------------------------------------------------  General: no fever  MSK: no edema     VITALS/PHYSICAL EXAM  --------------------------------------------------------------------------------  T(C): 36.9 (05-01-24 @ 06:00), Max: 36.9 (04-30-24 @ 21:45)  HR: 58 (05-01-24 @ 06:00) (52 - 65)  BP: 153/66 (05-01-24 @ 06:00) (132/50 - 165/56)  RR: 20 (05-01-24 @ 06:00) (16 - 20)  SpO2: 98% (05-01-24 @ 06:00) (97% - 100%)  Wt(kg): --  Height (cm): 172.7 (04-29-24 @ 23:49)  Weight (kg): 52.617 (04-29-24 @ 23:49)  BMI (kg/m2): 17.6 (04-29-24 @ 23:49)  BSA (m2): 1.62 (04-29-24 @ 23:49)      Physical Exam:  	Gen: NAD  	HEENT: MMM  	Pulm: CTA B/L  	CV: S1S2  	Abd: Soft, +BS  	Ext: No LE edema B/L                      Neuro: Awake   	Skin: Warm and Dry   	Vascular access: avf          SAI no  yoshi  LABS/STUDIES  --------------------------------------------------------------------------------              13.1   9.64  >-----------<  114      [05-01-24 @ 05:48]              40.9     135  |  89  |  61  ----------------------------<  111      [05-01-24 @ 05:48]  6.0   |  26  |  9.02        Ca     8.9     [05-01-24 @ 05:48]      Mg     3.00     [05-01-24 @ 05:48]      Phos  7.3     [05-01-24 @ 05:48]    TPro  7.8  /  Alb  4.8  /  TBili  0.7  /  DBili  x   /  AST  17  /  ALT  10  /  AlkPhos  92  [04-29-24 @ 16:19]    PT/INR: PT 11.9 , INR 1.06       [04-29-24 @ 16:19]  PTT: 32.2       [04-29-24 @ 16:19]    CK 50      [04-29-24 @ 16:19]    Creatinine Trend:  SCr 9.02 [05-01 @ 05:48]  SCr 7.74 [04-30 @ 06:03]  SCr 6.28 [04-29 @ 16:19]    Urinalysis - [05-01-24 @ 05:48]      Color  / Appearance  / SG  / pH       Gluc 111 / Ketone   / Bili  / Urobili        Blood  / Protein  / Leuk Est  / Nitrite       RBC  / WBC  / Hyaline  / Gran  / Sq Epi  / Non Sq Epi  / Bacteria       Iron 47, TIBC --, %sat --      [05-01-24 @ 06:44]  TSH 2.79      [04-30-24 @ 06:03]

## 2024-05-01 NOTE — PROGRESS NOTE ADULT - ASSESSMENT
conservative gi magnment  agree with team/ dr. aviles likely gastropareiss  pt not communicative  no hiccoughs now  reglan is appropriate  will follow with you  risk>benefit egd

## 2024-05-02 ENCOUNTER — RESULT REVIEW (OUTPATIENT)
Age: 74
End: 2024-05-02

## 2024-05-02 LAB
ANION GAP SERPL CALC-SCNC: 24 MMOL/L — HIGH (ref 7–14)
APPEARANCE CSF: CLEAR — SIGNIFICANT CHANGE UP
APPEARANCE SPUN FLD: SIGNIFICANT CHANGE UP
BACTERIAL AG PNL SER: 0 % — SIGNIFICANT CHANGE UP
BLOOD GAS ARTERIAL COMPREHENSIVE RESULT: SIGNIFICANT CHANGE UP
BLOOD GAS VENOUS COMPREHENSIVE RESULT: SIGNIFICANT CHANGE UP
BUN SERPL-MCNC: 28 MG/DL — HIGH (ref 7–23)
C NEOFORM RRNA SPEC NAA+PROBE-ACNC: SIGNIFICANT CHANGE UP
CALCIUM SERPL-MCNC: 8.9 MG/DL — SIGNIFICANT CHANGE UP (ref 8.4–10.5)
CHLORIDE SERPL-SCNC: 95 MMOL/L — LOW (ref 98–107)
CMV DNA CSF QL NAA+PROBE: SIGNIFICANT CHANGE UP
CO2 SERPL-SCNC: 19 MMOL/L — LOW (ref 22–31)
COLOR CSF: SIGNIFICANT CHANGE UP
CREAT SERPL-MCNC: 4.98 MG/DL — HIGH (ref 0.5–1.3)
CSF PCR RESULT: SIGNIFICANT CHANGE UP
E COLI K1 DNA CSF QL NAA+NON-PROBE: SIGNIFICANT CHANGE UP
EGFR: 12 ML/MIN/1.73M2 — LOW
EOSINOPHIL # CSF: 0 % — SIGNIFICANT CHANGE UP
ESCHERICHIA COLI K1: SIGNIFICANT CHANGE UP
EV RNA CSF QL NAA+PROBE: SIGNIFICANT CHANGE UP
GLUCOSE BLDC GLUCOMTR-MCNC: 112 MG/DL — HIGH (ref 70–99)
GLUCOSE BLDC GLUCOMTR-MCNC: 122 MG/DL — HIGH (ref 70–99)
GLUCOSE BLDC GLUCOMTR-MCNC: 141 MG/DL — HIGH (ref 70–99)
GLUCOSE BLDC GLUCOMTR-MCNC: 151 MG/DL — HIGH (ref 70–99)
GLUCOSE BLDC GLUCOMTR-MCNC: 154 MG/DL — HIGH (ref 70–99)
GLUCOSE BLDC GLUCOMTR-MCNC: 157 MG/DL — HIGH (ref 70–99)
GLUCOSE BLDC GLUCOMTR-MCNC: 185 MG/DL — HIGH (ref 70–99)
GLUCOSE CSF-MCNC: 87 MG/DL — HIGH (ref 40–70)
GLUCOSE SERPL-MCNC: 114 MG/DL — HIGH (ref 70–99)
GP B STREP DNA SPEC QL NAA+PROBE: SIGNIFICANT CHANGE UP
GRAM STN FLD: SIGNIFICANT CHANGE UP
HAEM INFLU DNA SPEC QL NAA+PROBE: SIGNIFICANT CHANGE UP
HBV CORE AB SER-ACNC: SIGNIFICANT CHANGE UP
HBV SURFACE AB SER-ACNC: <3 MIU/ML — LOW
HCT VFR BLD CALC: 41.2 % — SIGNIFICANT CHANGE UP (ref 39–50)
HGB BLD-MCNC: 13.3 G/DL — SIGNIFICANT CHANGE UP (ref 13–17)
HHV6 DNA CSF QL NAA+PROBE: SIGNIFICANT CHANGE UP
HSV1 DNA CSF QL NAA+PROBE: SIGNIFICANT CHANGE UP
HSV2 DNA CSF QL NAA+PROBE: SIGNIFICANT CHANGE UP
L MONOCYTOG DNA SPEC QL NAA+PROBE: SIGNIFICANT CHANGE UP
LACTATE SERPL-SCNC: 3.1 MMOL/L — HIGH (ref 0.5–2)
LDH CSF L TO P-CCNC: 27 U/L — SIGNIFICANT CHANGE UP
LDH FLD-CCNC: 27 U/L — SIGNIFICANT CHANGE UP
LYMPHOCYTES # CSF: 45 % — SIGNIFICANT CHANGE UP
MAGNESIUM SERPL-MCNC: 2.3 MG/DL — SIGNIFICANT CHANGE UP (ref 1.6–2.6)
MCHC RBC-ENTMCNC: 20.3 PG — LOW (ref 27–34)
MCHC RBC-ENTMCNC: 32.3 GM/DL — SIGNIFICANT CHANGE UP (ref 32–36)
MCV RBC AUTO: 63 FL — LOW (ref 80–100)
MONOS+MACROS NFR CSF: 46 % — SIGNIFICANT CHANGE UP
MRSA PCR RESULT.: SIGNIFICANT CHANGE UP
N MEN DNA SPEC QL NAA+PROBE: SIGNIFICANT CHANGE UP
NEUTROPHILS # CSF: 9 % — SIGNIFICANT CHANGE UP
NRBC # BLD: 2 /100 WBCS — HIGH (ref 0–0)
NRBC # FLD: 0.37 K/UL — HIGH (ref 0–0)
NRBC NFR CSF: 1 CELLS/UL — SIGNIFICANT CHANGE UP (ref 0–5)
OTHER CELLS CSF MANUAL: 0 % — SIGNIFICANT CHANGE UP
PARECHOVIRUS A RNA SPEC QL NAA+PROBE: SIGNIFICANT CHANGE UP
PHOSPHATE SERPL-MCNC: 3.7 MG/DL — SIGNIFICANT CHANGE UP (ref 2.5–4.5)
PLATELET # BLD AUTO: 101 K/UL — LOW (ref 150–400)
POTASSIUM SERPL-MCNC: 4.4 MMOL/L — SIGNIFICANT CHANGE UP (ref 3.5–5.3)
POTASSIUM SERPL-SCNC: 4.4 MMOL/L — SIGNIFICANT CHANGE UP (ref 3.5–5.3)
PROLACTIN SERPL-MCNC: 124 NG/ML — HIGH (ref 4.1–18.4)
PROT CSF-MCNC: 86 MG/DL — HIGH (ref 15–45)
RBC # BLD: 6.54 M/UL — HIGH (ref 4.2–5.8)
RBC # CSF: 1 CELLS/UL — HIGH (ref 0–0)
RBC # FLD: 22.7 % — HIGH (ref 10.3–14.5)
S AUREUS DNA NOSE QL NAA+PROBE: SIGNIFICANT CHANGE UP
S PNEUM DNA SPEC QL NAA+PROBE: SIGNIFICANT CHANGE UP
SODIUM SERPL-SCNC: 138 MMOL/L — SIGNIFICANT CHANGE UP (ref 135–145)
SPECIMEN SOURCE: SIGNIFICANT CHANGE UP
TOTAL CELLS COUNTED, SPINAL FLUID: 11 CELLS — SIGNIFICANT CHANGE UP
TUBE TYPE: SIGNIFICANT CHANGE UP
VZV DNA CSF QL NAA+PROBE: SIGNIFICANT CHANGE UP
WBC # BLD: 24.85 K/UL — HIGH (ref 3.8–10.5)
WBC # FLD AUTO: 24.85 K/UL — HIGH (ref 3.8–10.5)

## 2024-05-02 PROCEDURE — 99291 CRITICAL CARE FIRST HOUR: CPT | Mod: GC,25

## 2024-05-02 PROCEDURE — 93931 UPPER EXTREMITY STUDY: CPT | Mod: 26,RT

## 2024-05-02 PROCEDURE — 62270 DX LMBR SPI PNXR: CPT | Mod: GC

## 2024-05-02 PROCEDURE — 99232 SBSQ HOSP IP/OBS MODERATE 35: CPT

## 2024-05-02 PROCEDURE — 76604 US EXAM CHEST: CPT | Mod: 26,GC

## 2024-05-02 PROCEDURE — 93308 TTE F-UP OR LMTD: CPT | Mod: 26,GC

## 2024-05-02 PROCEDURE — 71045 X-RAY EXAM CHEST 1 VIEW: CPT | Mod: 26

## 2024-05-02 RX ORDER — MIDAZOLAM HYDROCHLORIDE 1 MG/ML
6 INJECTION, SOLUTION INTRAMUSCULAR; INTRAVENOUS ONCE
Refills: 0 | Status: DISCONTINUED | OUTPATIENT
Start: 2024-05-02 | End: 2024-05-02

## 2024-05-02 RX ORDER — CHLORHEXIDINE GLUCONATE 213 G/1000ML
15 SOLUTION TOPICAL EVERY 12 HOURS
Refills: 0 | Status: DISCONTINUED | OUTPATIENT
Start: 2024-05-02 | End: 2024-05-10

## 2024-05-02 RX ORDER — NOREPINEPHRINE BITARTRATE/D5W 8 MG/250ML
0.1 PLASTIC BAG, INJECTION (ML) INTRAVENOUS
Qty: 8 | Refills: 0 | Status: DISCONTINUED | OUTPATIENT
Start: 2024-05-02 | End: 2024-05-03

## 2024-05-02 RX ORDER — PROPOFOL 10 MG/ML
20 INJECTION, EMULSION INTRAVENOUS ONCE
Refills: 0 | Status: COMPLETED | OUTPATIENT
Start: 2024-05-02 | End: 2024-05-02

## 2024-05-02 RX ORDER — INSULIN LISPRO 100/ML
VIAL (ML) SUBCUTANEOUS EVERY 6 HOURS
Refills: 0 | Status: DISCONTINUED | OUTPATIENT
Start: 2024-05-02 | End: 2024-05-07

## 2024-05-02 RX ORDER — FENTANYL CITRATE 50 UG/ML
100 INJECTION INTRAVENOUS ONCE
Refills: 0 | Status: DISCONTINUED | OUTPATIENT
Start: 2024-05-02 | End: 2024-05-02

## 2024-05-02 RX ORDER — PROPOFOL 10 MG/ML
20 INJECTION, EMULSION INTRAVENOUS
Qty: 1000 | Refills: 0 | Status: DISCONTINUED | OUTPATIENT
Start: 2024-05-02 | End: 2024-05-05

## 2024-05-02 RX ADMIN — HEPARIN SODIUM 5000 UNIT(S): 5000 INJECTION INTRAVENOUS; SUBCUTANEOUS at 05:34

## 2024-05-02 RX ADMIN — PROPOFOL 6.31 MICROGRAM(S)/KG/MIN: 10 INJECTION, EMULSION INTRAVENOUS at 19:57

## 2024-05-02 RX ADMIN — PROPOFOL 6.31 MICROGRAM(S)/KG/MIN: 10 INJECTION, EMULSION INTRAVENOUS at 09:06

## 2024-05-02 RX ADMIN — HEPARIN SODIUM 5000 UNIT(S): 5000 INJECTION INTRAVENOUS; SUBCUTANEOUS at 17:47

## 2024-05-02 RX ADMIN — Medication 9.87 MICROGRAM(S)/KG/MIN: at 09:06

## 2024-05-02 RX ADMIN — Medication 9.87 MICROGRAM(S)/KG/MIN: at 19:57

## 2024-05-02 RX ADMIN — Medication 1 APPLICATION(S): at 17:47

## 2024-05-02 RX ADMIN — FENTANYL CITRATE 100 MICROGRAM(S): 50 INJECTION INTRAVENOUS at 10:30

## 2024-05-02 RX ADMIN — MIDAZOLAM HYDROCHLORIDE 6 MILLIGRAM(S): 1 INJECTION, SOLUTION INTRAMUSCULAR; INTRAVENOUS at 09:05

## 2024-05-02 RX ADMIN — CHLORHEXIDINE GLUCONATE 1 APPLICATION(S): 213 SOLUTION TOPICAL at 05:36

## 2024-05-02 RX ADMIN — PROPOFOL 20 MILLIGRAM(S): 10 INJECTION, EMULSION INTRAVENOUS at 09:05

## 2024-05-02 RX ADMIN — Medication 1: at 05:35

## 2024-05-02 RX ADMIN — Medication 1: at 18:10

## 2024-05-02 RX ADMIN — CHLORHEXIDINE GLUCONATE 15 MILLILITER(S): 213 SOLUTION TOPICAL at 17:47

## 2024-05-02 RX ADMIN — Medication 1: at 13:16

## 2024-05-02 NOTE — CONSULT NOTE ADULT - SUBJECTIVE AND OBJECTIVE BOX
Interventional Radiology      HPI: 73y Male with HTN, ESRD (on HD M/W/F), DM who presented with hiccups x 2 days and chest pain found to have peaked T waves. He was evaluated by cardiology and was planned for nuclear stress test. He was started on reglan for possible gastroparesis. Hospital course c/b AMS s/p RRT on 5/1 and 5/2. Vascular consulted 2/2 RUE AVF access unable to be used s/p R femoral shiley placed for emergent HD. Transferred to MICU and intubated 5/2 for airway protection. IR was consulted for IJ Shiley placement.     Allergies: No Known Allergies    Medications (Abx/Cardiac/Anticoagulation/Blood Products)    carvedilol: 12.5 milliGRAM(s) Oral (05-01 @ 05:57)  heparin   Injectable: 5000 Unit(s) SubCutaneous (05-02 @ 05:34)  hydrALAZINE: 100 milliGRAM(s) Oral (05-01 @ 05:57)  NIFEdipine XL: 60 milliGRAM(s) Oral (05-01 @ 05:57)  norepinephrine Infusion: 9.87 mL/Hr IV Continuous (05-02 @ 09:05)    Data:    T(C): 37.8  HR: 60  BP: 134/35  RR: 18  SpO2: 100%    -WBC 24.85 / HgB 13.3 / Hct 41.2 / Plt 101  -Na 138 / Cl 95 / BUN 28 / Glucose 114  -K 4.4 / CO2 19 / Cr 4.98  -ALT -- / Alk Phos -- / T.Bili --  -INR 1.06 / PTT 32.2      Radiology:   Imaging reviewed.    Assessment/Plan:   73y Male with HTN, ESRD (on HD M/W/F), DM who presented with hiccups x 2 days and chest pain found to have peaked T waves. He was evaluated by cardiology and was planned for nuclear stress test. He was started on reglan for possible gastroparesis. Hospital course c/b AMS s/p RRT on 5/1 and 5/2. Vascular consulted 2/2 RUE AVF access unable to be used s/p R femoral shiley placed for emergent HD. Transferred to MICU and intubated 5/2 for airway protection. IR was consulted for IJ Shiley placement.     -- case discussed with Dr. Reyes  -- spoke to MICU resident Dr. Martínez. Agree with vascular that R femoral Shiley needs to be removed and IJ Shiley should be placed by MICU team. This should not preclude the patient from Permacath placement in the future.   -- if Permacath is needed, IR can be re-consulted once patient is stable and close to discharge  -- no IR intervention indicated at this time, IR will sign off     --  Lonnie Luque MD PGY-3  Available on Microsoft Teams    - Non-emergent consults: Place IR consult order in [place]  - Emergent issues (pager): [facility] [phone]; [facility] [phone]; [phone]  - Scheduling questions: [facility] [phone]; [facility] [phone]  - Clinic/outpatient booking: [facility] [phone]; [facility] [phone] Interventional Radiology      HPI: 73y Male with HTN, ESRD (on HD M/W/F), DM who presented with hiccups x 2 days and chest pain found to have peaked T waves. He was evaluated by cardiology and was planned for nuclear stress test. He was started on reglan for possible gastroparesis. Hospital course c/b AMS s/p RRT on 5/1 and 5/2. Vascular consulted 2/2 RUE AVF access unable to be used s/p R femoral shiley placed for emergent HD. Transferred to MICU and intubated 5/2 for airway protection. IR was consulted for IJ Shiley placement.     Allergies: No Known Allergies    Medications (Abx/Cardiac/Anticoagulation/Blood Products)    carvedilol: 12.5 milliGRAM(s) Oral (05-01 @ 05:57)  heparin   Injectable: 5000 Unit(s) SubCutaneous (05-02 @ 05:34)  hydrALAZINE: 100 milliGRAM(s) Oral (05-01 @ 05:57)  NIFEdipine XL: 60 milliGRAM(s) Oral (05-01 @ 05:57)  norepinephrine Infusion: 9.87 mL/Hr IV Continuous (05-02 @ 09:05)    Data:    T(C): 37.8  HR: 60  BP: 134/35  RR: 18  SpO2: 100%    -WBC 24.85 / HgB 13.3 / Hct 41.2 / Plt 101  -Na 138 / Cl 95 / BUN 28 / Glucose 114  -K 4.4 / CO2 19 / Cr 4.98  -ALT -- / Alk Phos -- / T.Bili --  -INR 1.06 / PTT 32.2      Radiology:   Imaging reviewed.    Assessment/Plan:   73y Male with HTN, ESRD (on HD M/W/F), DM who presented with hiccups x 2 days and chest pain found to have peaked T waves. He was evaluated by cardiology and was planned for nuclear stress test. He was started on reglan for possible gastroparesis. Hospital course c/b AMS s/p RRT on 5/1 and 5/2. Vascular consulted 2/2 RUE AVF access unable to be used s/p R femoral shiley placed for emergent HD. Transferred to MICU and intubated 5/2 for airway protection. IR was consulted for IJ Shiley placement.     -- case discussed with Dr. Reyes  -- spoke to MICU resident Dr. Martínez. Agree with vascular that R femoral Shiley needs to be removed and IJ Shiley should be placed by MICU team. This should not preclude the patient from tunneled catheter placement in the future.   -- if tunneled catheter is needed, IR can be re-consulted once patient is stable and close to discharge  -- no IR intervention indicated at this time, IR will sign off     --  Lonnie Luque MD PGY-3  Available on [employer] Teams    - Non-emergent consults: Place IR consult order in [place]  - Emergent issues (pager): [facility] [phone]; [facility] [phone]; [phone]  - Scheduling questions: [facility] [phone]; [facility] [phone]  - Clinic/outpatient booking: [facility] [phone]; [facility] [phone]

## 2024-05-02 NOTE — CHART NOTE - NSCHARTNOTEFT_GEN_A_CORE
: Patricia Florence     INDICATION: critical illness, altered mental status    PROCEDURE:  [ x ] LIMITED ECHO  [ x ] LIMITED CHEST  [ ] LIMITED RETROPERITONEAL  [ x ] LIMITED ABDOMINAL  [ ] LIMITED DVT  [ ] NEEDLE GUIDANCE VASCULAR  [ ] NEEDLE GUIDANCE THORACENTESIS  [ ] NEEDLE GUIDANCE PARACENTESIS  [ ] NEEDLE GUIDANCE PERICARDIOCENTESIS  [ ] OTHER    FINDINGS:  A line predominant in bilateral lung fields.  ECHO with normal cardiac function.   Hard to visualize stomach content.    INTERPRETATION:  Normal aeration pattern in bilateral lungs.   No cardiac limitations.      Performed with Dr. Beckman at bedside. : Shoaib Thompson    INDICATION: critical illness, altered mental status    PROCEDURE:  [ x ] LIMITED ECHO  [ x ] LIMITED CHEST  [ ] LIMITED RETROPERITONEAL  [ x ] LIMITED ABDOMINAL  [ ] LIMITED DVT  [ ] NEEDLE GUIDANCE VASCULAR  [ ] NEEDLE GUIDANCE THORACENTESIS  [ ] NEEDLE GUIDANCE PARACENTESIS  [ ] NEEDLE GUIDANCE PERICARDIOCENTESIS  [ ] OTHER    FINDINGS:  A line predominant in bilateral lung fields.  ECHO with normal cardiac function.   Hard to visualize stomach content. o Ascitres    INTERPRETATION:  Normal aeration pattern in bilateral lungs.   No cardiac limitations.      Performed with Dr. Beckman at bedside.    I have assisted and supervised entire procedure.    Shoaib Beckman MD

## 2024-05-02 NOTE — CONSULT NOTE ADULT - ASSESSMENT
74 y/o M PMhx HTN, ESRD (on HD M/W/F), DM who presented with hiccups x 2 days and chest pain found to have peaked T waves. He was evaluated by cardiology and was planned for nuclear stress test. He was started on reglan for possible gastroparesis. Hospital course c/b AMS s/p RRT on 5/1 and 5/2. Vascular consulted 2/2 RUE AVF access unable to be used s/p R femoral shiley placed for emergent HD. Transferred to MICU and intubated 5/2 for airway protection.     AMS, leukocytosis  no evidence of SSTI on exam  CT head- No acute intracranial hemorrhage or acute territorial infarction.  admission CT abd/pelvis- Moderate gastric distention. No CT evidence of mechanical obstruction or SBO  CXR- clear, no focal consolidation  TTE- no evidence of valvular disease  s/p LP 5/2, cell count not consistent w/ bacterial meningitis  MRSA PCR negative  unclear etiology of leukocytosis- very rapid increase in short period of time during period of AMS    Recommendations  agree w/ monitoring off antibiotics for now  if change in hemodynamics would start empiric zosyn and repeat CT abd/pelvis  f/u CSF PCR  f/u blood cultures  trend temps, cbc w/ diff    Steven Torres M.D.  [facility], Division of Infectious Diseases  [phone]  After 5pm on weekdays and all day on weekends - please call [phone]  72 y/o M PMhx HTN, ESRD (on HD M/W/F), DM who presented with hiccups x 2 days and chest pain found to have peaked T waves. He was evaluated by cardiology and was planned for nuclear stress test. He was started on reglan for possible gastroparesis. Hospital course c/b AMS s/p RRT on 5/1 and 5/2. Vascular consulted 2/2 RUE AVF access unable to be used s/p R femoral shiley placed for emergent HD. Transferred to MICU and intubated 5/2 for airway protection.     AMS, leukocytosis  afebrile  no evidence of SSTI on exam  CT head- No acute intracranial hemorrhage or acute territorial infarction.  admission CT abd/pelvis- Moderate gastric distention. No CT evidence of mechanical obstruction or SBO  CXR- clear, no focal consolidation  TTE- no evidence of valvular disease  s/p LP 5/2, cell count not consistent w/ bacterial meningitis  MRSA PCR negative  unclear etiology of leukocytosis- very rapid increase in short period of time during period of AMS    Recommendations  agree w/ monitoring off antibiotics for now  if change in hemodynamics would start empiric zosyn and repeat CT abd/pelvis  f/u CSF PCR  f/u blood cultures  trend temps, cbc w/ diff    Steven Torres M.D.  [facility], Division of Infectious Diseases  [phone]  After 5pm on weekdays and all day on weekends - please call [phone]

## 2024-05-02 NOTE — PROGRESS NOTE ADULT - ASSESSMENT
73M w/ PMH of HTN, ESRD (MWF), IDDM, p/f chest pain c/b hiccups for the last 2 days undergoing ischemic evaluation per cardiology. Pt s/p RRT x 2 for AMS. MICU consulted and accepting for airway monitoring in setting of ? baclofen toxicity.     NEUROLOGY   #AMS  - patient with multiple RRTs for AMS  - CTH without acute findings   - may be due to baclofen toxicity vs withdrawal  - neurology w/ concerns for meningoencephalitis; plan for LP     PLAN  - follow up tox and neuro recs  - obtain MRI brain and vEEG  - cw HD for concern of baclofen toxicity       CARDIOVASCULAR     #Angina  - patient admitted with chest pain and noted to have peaked T waves on admission   - TTE showing EF 72%  - pending NST with Cards once improvement in clinical status     #HTN  - cw coreg, hydralazine, and nifedipine     RESPIRATORY   [facility]    GI/NUTRITION   #Gastroporesis   cw reglan     /RENAL   #ESRD MWF  - cw HD with R fem shiley  - pending fistula study of RUE (failed previous HD session)      INFECTIOUS DISEASE   #leukocytosis  - noted on repeat labs  - follow up infectious workup  - would monitor off antibiotics for now     ENDOCRINE   #IDD  -cw lantus 6U and admelog 2U  - cw ISS    HEMATOLOGY/ONCOLOGY  [facility]      DVT PPX  SQH     ETHICS  Full code

## 2024-05-02 NOTE — PROGRESS NOTE ADULT - SUBJECTIVE AND OBJECTIVE BOX
Cardiovascular Disease Progress Note  DATE OF SERVICE: 24 @ 07:56    Overnight events: Events noted.  Mr. Art is in no distress.  He opens his eyes to tactile stimuli, but does not engage in conversation.        Objective Findings:  T(C): 37.9 (24 @ 05:00), Max: 37.9 (24 @ 05:00)  HR: 80 (24 @ 07:00) (60 - 108)  BP: 169/70 (24 @ 07:00) (96/81 - 199/69)  RR: 23 (24 @ 07:00) (15 - 23)  SpO2: 100% (24 @ 07:00) (92% - 100%)  Wt(kg): --  Daily     Daily Weight in k (02 May 2024 05:00)      Physical Exam:  Gen: NAD; Patient resting comfortably  HEENT:   Normocephalic/ atraumatic  CV: RRR, normal S1 + S2, no m/r/g  Lungs:  Normal respiratory effort; fair air entry to auscultation bilaterally  Abd: soft, non-tender; bowel sounds present  Ext: No edema; warm and well perfused    Telemetry: Sinus    Laboratory Data:                        13.3   24.85 )-----------( 101      ( 02 May 2024 03:37 )             41.2     05-    138  |  95<L>  |  28<H>  ----------------------------<  114<H>  4.4   |  19<L>  |  4.98<H>    Ca    8.9      02 May 2024 03:37  Phos  3.7       Mg     2.30         TPro  8.0  /  Alb  4.4  /  TBili  0.6  /  DBili  x   /  AST  10  /  ALT  8   /  AlkPhos  94  05-              Inpatient Medications:  MEDICATIONS  (STANDING):  atorvastatin 20 milliGRAM(s) Oral at bedtime  calcium acetate 667 milliGRAM(s) Oral three times a day with meals  carvedilol 12.5 milliGRAM(s) Oral every 12 hours  chlorhexidine 2% Cloths 1 Application(s) Topical <User Schedule>  dextrose 10% Bolus 125 milliLiter(s) IV Bolus once  dextrose 5%. 1000 milliLiter(s) (100 mL/Hr) IV Continuous <Continuous>  dextrose 5%. 1000 milliLiter(s) (50 mL/Hr) IV Continuous <Continuous>  dextrose 50% Injectable 12.5 Gram(s) IV Push once  dextrose 50% Injectable 25 Gram(s) IV Push once  dextrose 50% Injectable 50 milliLiter(s) IV Push once  epoetin reilly (EPOGEN) Injectable 79331 Unit(s) IV Push <User Schedule>  glucagon  Injectable 1 milliGRAM(s) IntraMuscular once  heparin   Injectable 5000 Unit(s) SubCutaneous every 12 hours  hydrALAZINE 100 milliGRAM(s) Oral three times a day  insulin lispro (ADMELOG) corrective regimen sliding scale   SubCutaneous every 6 hours  insulin regular  human recombinant 5 Unit(s) IV Push once  metoclopramide 10 milliGRAM(s) Oral three times a day before meals  NIFEdipine XL 60 milliGRAM(s) Oral two times a day      Assessment:  73 year old man with HTN, HLD, T2DM on insulin, and ESRD on HD presents with supply demand ischemia and angina.    Plan of Care:    #Angina-  Patient with atypical symptoms on presentation, however multiple risk factors for CAD.   Of note, he was admitted earlier this year with supply demand ischemia.  Plan for nuclear stress test now postponed due to encephalopathy.   Encephalopathy work up as per Neuro and MICU    #Sinus bradycardia-  No evidence of AV block on admission EKG or telemetry.  No indication for pacing at this time.     #HTN-  BP controlled this morning.    #ESRD-  HD as per renal.       Critical care in the MICU    Over 47 minutes of critical care time spent on total encounter; more than 50% of the visit was spent counseling and/or coordinating care by the attending physician.      Geovani Bravo MD [facility]  Cardiovascular Disease  [phone]

## 2024-05-02 NOTE — CONSULT NOTE ADULT - SUBJECTIVE AND OBJECTIVE BOX
~~~~~~~~~~~~~~~~~~~~~~~~~~~~~~~~~~~~~~~~~~~~~~~~~~  Neurology Service   [facility] Consult [phone], Spectra [id number]  [facility] General Neurology- Spectra [id number]  [facility] Epilepsy Monitoring Unit- Spectra   [facility] Stroke Service- spectra [phone]  [facility] Consult Service [id number], [phone]  [facility] Stroke Consult Service , [phone] (7a-7p, otherwise call [phone])  ~~~~~~~~~~~~~~~~~~~~~~~~~~~~~~~~~~~~~~~~~~~~~~~~~~    History:        HPI:   73-year-old unknown handed man with past medical history of hypertension, ESRD on dialysis IDDM, presented 4/9/2024 with 2 days of hiccups and mild chest pain.  At that time, he denied shortness of breath, lightheadedness, dizziness, potation's, syncope. ED course: Vital signs stable.  EKG/telemetry was notable for hyperacute T waves and bradycardia.  Labs were notable for neutrophilic predominant leukocytosis, microcytic anemia, thrombocytopenia.  Potassium was normal.  Creatinine 6/BUN 43.  Hypermagnesia, hyperphosphatemia, elevated cardiac enzymes.normal lactate and normal pH.      Course has been notable for hypoglycemia, asymptomatic sinus bradycardia, loss and restablishement of dialysis access, resolution and recurrence of leukocytosis again with neutrophilic predominance.  No infectious workup or antimicrobials during this admission.  Cardiology recommended nuclear stress test for atypical angina.  Nephrology recommended to calcium acetate for hyperphosphatemia.  CT chest on pelvis showed distended stomach thought to be due to gastroparesis for which medical provide was started. He was started on baclofen 5 mg 4/29/2024 evening through 5/1/2024~6 AM.  Required 1 mg of Haldol 4/30 ~2200 (indication unclear).  Noted by primary team to be awake and interactive throughout hospitalization.  Baseline awake alert and oriented x 2, ambulatory and conversational.  5/1/2020 was pulling blanket overhead and outlined to engage with cardiology.  Later could not be awoken by dietitian.  Noted to be awake but not oriented and confused during dialysis, dialysis was complicated by hypoglycemia to 76 treated with dextrose.  He only tolerated 1 hour of dialysis.  Rapid response was called in the afternoon for altered mental status after wife noted eyes were rolled back and and he was shaking.  Primary team noted the patient was alert but not always interactive throughout the admission representing a change.  Labs continue to show electrolyte derangements.  CT head was recommended and showed no acute pathology.  Neurology consulted for altered mental status.  Additional rapid response in the evening.  RRT note reflects the patient was withdrawing all 4 extremities to pain.  Pupils were sluggish but reactive.  Labs with mild uremia no hypercarbia.  He required suction for copious secretions.  Vitals with hypertension.  RRT team with concern for baclofen toxicity given 10 mg 3 times daily dosing in the setting of ESRD    Review of Systems:    Unable to obtain    PMHx:  Diabetes  Stage 5 chronic kidney disease not on chronic dialysis  Benign essential HTN  HLD (hyperlipidemia)  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  ESRD on hemodialysis    Social History:  Unable to obtain    Medications  Home Medications:  atorvastatin 20 mg oral tablet: 1 tab(s) orally once a day (29 Apr 2024 21:17)  calamine topical lotion: 1 Apply topically to affected area 3 times a day As needed Itching (29 Apr 2024 21:17)  Coreg 25 mg oral tablet: 0.5 tab(s) orally 2 times a day 12.5 mg two times a day (29 Apr 2024 21:17)    MEDICATIONS  (STANDING):  atorvastatin 20 milliGRAM(s) Oral at bedtime  calcium acetate 667 milliGRAM(s) Oral three times a day with meals  carvedilol 12.5 milliGRAM(s) Oral every 12 hours  chlorhexidine 2% Cloths 1 Application(s) Topical <User Schedule>  dextrose 10% Bolus 125 milliLiter(s) IV Bolus once  dextrose 5%. 1000 milliLiter(s) (50 mL/Hr) IV Continuous <Continuous>  dextrose 5%. 1000 milliLiter(s) (100 mL/Hr) IV Continuous <Continuous>  dextrose 50% Injectable 12.5 Gram(s) IV Push once  dextrose 50% Injectable 25 Gram(s) IV Push once  dextrose 50% Injectable 50 milliLiter(s) IV Push once  epoetin reilly (EPOGEN) Injectable 27577 Unit(s) IV Push <User Schedule>  glucagon  Injectable 1 milliGRAM(s) IntraMuscular once  heparin   Injectable 5000 Unit(s) SubCutaneous every 12 hours  hydrALAZINE 100 milliGRAM(s) Oral three times a day  hydrALAZINE Injectable 10 milliGRAM(s) IV Push once  insulin glargine Injectable (LANTUS) 6 Unit(s) SubCutaneous at bedtime  insulin lispro (ADMELOG) corrective regimen sliding scale   SubCutaneous three times a day before meals  insulin lispro Injectable (ADMELOG) 2 Unit(s) SubCutaneous three times a day before meals  insulin regular  human recombinant 5 Unit(s) IV Push once  metoclopramide 10 milliGRAM(s) Oral three times a day before meals  NIFEdipine XL 60 milliGRAM(s) Oral two times a day    MEDICATIONS  (PRN):  dextrose Oral Gel 15 Gram(s) Oral once PRN Blood Glucose LESS THAN 70 milliGRAM(s)/deciliter      Exam:  Vitals:  T(C): 36.9 (05-01-24 @ 23:59), Max: 37.6 (05-01-24 @ 22:15)  T(F): 98.5 (05-01-24 @ 23:59), Max: 99.6 (05-01-24 @ 22:15)  HR: 92 (05-01-24 @ 23:59) (58 - 92)  BP: 194/77 (05-01-24 @ 23:59) (129/98 - 199/69)  RR: 18 (05-01-24 @ 23:59) (18 - 20)  SpO2: 96% (05-01-24 @ 23:30) (94% - 98%)    I&O's Summary    01 May 2024 07:01  -  02 May 2024 01:51  --------------------------------------------------------  IN: 400 mL / OUT: 691 mL / NET: -291 mL      General:  ~01:15 5/2/24  Dialysis ongoing  Patient found eyes closed with nonrhythmic twitching in all 4 extremities most prominent in the left upper extremity.  In the left upper extremity twitching manifested as slight elbow extension moving from approximately 5-10 degrees flexed to fully extended.  This would occur sporadically sometimes as a single movement, sometimes in bursts of up to 3/second for 1 second sometimes not occurring for several seconds at a time.   Right upper extremity twitching somewhat less pronounced in both amplitude and frequency but otherwise similar.  There was occasional bilateral elbow flexion coinciding with bilateral hip flexion and wrist when patient would cough. Sometimes hip flexion occuring without cough or arm flexion.  Twitching in the lower extremities was of the bilateral quadriceps often without significant movement.  Also nonrhythmic/sporadic.  Often gargling.  Occasionally coughing.  Tearing from the bilateral eyes  Mental status: Eyes closed.  Eyes would open briefly to strong central noxious stimuli (not always reproducibly), quickly closing again afterwards.  Does not follow any commands.  No verbal output.  Cranial nerves.  Pupils with very mild anisocoria right bigger than left, likely within physiological limitations.  Nonreactive to light.  Ocular movements observed.  No facial asymmetry  Motor: Moves all extremities when coughing.  Hands are Tightly closed with a  that cannot be overcome bilaterally.  Left arm has increased tone and remains mostly to completely extended at the elbow.  Increased tone in the right arm as well.  Sensory: Withdraws to noxious in the bilateral lower extremities, no grimace or withdrawal or movement to noxious in the bilateral upper extremities  Reflexes: 2+ in the bilateral triceps, biceps, 1+ in the bilateral brachioradialis.  Not elicited in patella or Achilles bilaterally.  Hands are tonically contracted-unable to assess for Chuck.  Toes are tonically downgoing, no change on assessment for plantar response.  Coordination: Unable to assess due to mental status`  Gait: Unable to assess due to mental status      Objective Studies  Labs:             13.2   13.32 )-----------( 134      ( 01 May 2024 22:42 )             40.9   136  |  92<L>  |  47<H>  ----------------------------<  117<H>  5.2   |  22  |  7.58<H>    Ca    9.0      01 May 2024 22:42  Phos  6.0     05-01  Mg     2.70     05-01    TPro  8.0  /  Alb  4.4  /  TBili  0.6  /  DBili  x   /  AST  10  /  ALT  8   /  AlkPhos  94  05-01  Urinalysis Basic - ( 01 May 2024 22:42 )  Color: x / Appearance: x / SG: x / pH: x  Gluc: 117 mg/dL / Ketone: x  / Bili: x / Urobili: x   Blood: x / Protein: x / Nitrite: x   Leuk Esterase: x / RBC: x / WBC x   Sq Epi: x / Non Sq Epi: x / Bacteria: x  Lactate Trend  CAPILLARY BLOOD GLUCOSE  POCT Blood Glucose.: 122 mg/dL (02 May 2024 01:12)    CNS Imaging:  CT Head No Cont:  (01 May 2024 18:11)  < from: CT Head No Cont (05.01.24 @ 18:11) >    ACC: [id number] EXAM:  CT BRAIN   ORDERED BY: SHELBY MOREL     PROCEDURE DATE:  05/01/2024          INTERPRETATION:  CT OF THE HEAD WITHOUT CONTRAST    CLINICAL INDICATION: Lethargy.    TECHNIQUE: Volumetric CT acquisition was performed through the brain and   reviewed using brain and bone window technique.      COMPARISON: CT head from 1/17/2024    FINDINGS:    The ventricular and sulcal size and configuration is age appropriate.     There is no acute loss of gray-white differentiation. Stable bilateral   inferior frontal encephalomalacia and gliosis likely related to remote   trauma.    There is no evidence of mass effect, midline shift, acute intracranial   hemorrhage, or extra-axial collections.     The calvarium is intact. The paranasalsinuses are clear.The mastoid air   cells are predominantly clear. The orbits are unremarkable.      IMPRESSION:  No acute intracranial hemorrhage or acute territorial infarction. Chronic   findings as above.    --- End of Report ---          GILDARDO KHAN; Resident Radiologist  This document has been electronically signed.  LADARIUS DENNY MD; Attending Radiologist  This document has been electronically signed. May  1 2024  7:54PM    < end of copied text >           ~~~~~~~~~~~~~~~~~~~~~~~~~~~~~~~~~~~~~~~~~~~~~~~~~~  Neurology Service   [facility] Consult [phone], Spectra [phone]  [facility] General Neurology- Spectra [id number]  [facility] Epilepsy Monitoring Unit- Spectra   [facility] Stroke Service- spectra [phone]  [facility] Consult Service [phone], [phone]  [facility] Stroke Consult Service , [phone] (7a-7p, otherwise call [phone])  ~~~~~~~~~~~~~~~~~~~~~~~~~~~~~~~~~~~~~~~~~~~~~~~~~~    History:        HPI:   73-year-old unknown handed man with past medical history of hypertension, ESRD on dialysis IDDM, presented 4/9/2024 with 2 days of hiccups and mild chest pain.  At that time, he denied shortness of breath, lightheadedness, dizziness, potation's, syncope. ED course: Vital signs stable.  EKG/telemetry was notable for hyperacute T waves and bradycardia.  Labs were notable for neutrophilic predominant leukocytosis, microcytic anemia, thrombocytopenia.  Potassium was normal.  Creatinine 6/BUN 43.  Hypermagnesia, hyperphosphatemia, elevated cardiac enzymes.normal lactate and normal pH.      Course has been notable for hypoglycemia, asymptomatic sinus bradycardia, loss and reestablishment of dialysis access, resolution and recurrence of leukocytosis again with neutrophilic predominance.  No infectious workup or antimicrobials during this admission (BCx now ordered).  Cardiology recommended nuclear stress test for atypical angina, was awaiting resolution of electrolyte derangements.  Nephrology recommended to calcium acetate for hyperphosphatemia.  CT chest on pelvis showed distended stomach thought to be due to gastroparesis for which medical provide was started. He was started on baclofen 5 mg 4/29/2024 evening through 5/1/2024~6 AM.  Required 1 mg of Haldol 4/30 ~2200 (indication unclear).  Noted by primary team to be awake and interactive throughout hospitalization. AO4 on arrival, more confused AO3 on the floor, AO2 following day, now not awake. Per documentation however  Baseline awake alert and oriented x 2, ambulatory and conversational.  5/1/2020 was pulling blanket overhead and outlined to engage with cardiology.  Later could not be awoken by dietitian.  Noted to be awake but not oriented and confused during dialysis, dialysis was complicated by hypoglycemia to 76 treated with dextrose.  He only tolerated 1 hour of dialysis.  Rapid response was called in the afternoon for altered mental status after wife noted eyes were rolled back and and he was shaking..  Labs continue to show electrolyte derangements.  CT head was recommended and showed no acute pathology.    Additional rapid response in the evening.  RRT note reflects the patient was withdrawing all 4 extremities to pain.  Pupils were sluggish but reactive.  Labs with mild uremia no hypercarbia.  He required suction for copious secretions.  Vitals with hypertension.  RRT team with concern for baclofen toxicity given 10 mg 3 times daily dosing in the setting of ESRD. Neurology consulted for altered mental status.    Review of Systems:    Unable to obtain    PMHx:  Diabetes  Stage 5 chronic kidney disease not on chronic dialysis  Benign essential HTN  HLD (hyperlipidemia)  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  ESRD on hemodialysis    Social History:  Unable to obtain    Medications  Home Medications:  atorvastatin 20 mg oral tablet: 1 tab(s) orally once a day (29 Apr 2024 21:17)  calamine topical lotion: 1 Apply topically to affected area 3 times a day As needed Itching (29 Apr 2024 21:17)  Coreg 25 mg oral tablet: 0.5 tab(s) orally 2 times a day 12.5 mg two times a day (29 Apr 2024 21:17)    MEDICATIONS  (STANDING):  atorvastatin 20 milliGRAM(s) Oral at bedtime  calcium acetate 667 milliGRAM(s) Oral three times a day with meals  carvedilol 12.5 milliGRAM(s) Oral every 12 hours  chlorhexidine 2% Cloths 1 Application(s) Topical <User Schedule>  dextrose 10% Bolus 125 milliLiter(s) IV Bolus once  dextrose 5%. 1000 milliLiter(s) (50 mL/Hr) IV Continuous <Continuous>  dextrose 5%. 1000 milliLiter(s) (100 mL/Hr) IV Continuous <Continuous>  dextrose 50% Injectable 12.5 Gram(s) IV Push once  dextrose 50% Injectable 25 Gram(s) IV Push once  dextrose 50% Injectable 50 milliLiter(s) IV Push once  epoetin reilly (EPOGEN) Injectable 14371 Unit(s) IV Push <User Schedule>  glucagon  Injectable 1 milliGRAM(s) IntraMuscular once  heparin   Injectable 5000 Unit(s) SubCutaneous every 12 hours  hydrALAZINE 100 milliGRAM(s) Oral three times a day  hydrALAZINE Injectable 10 milliGRAM(s) IV Push once  insulin glargine Injectable (LANTUS) 6 Unit(s) SubCutaneous at bedtime  insulin lispro (ADMELOG) corrective regimen sliding scale   SubCutaneous three times a day before meals  insulin lispro Injectable (ADMELOG) 2 Unit(s) SubCutaneous three times a day before meals  insulin regular  human recombinant 5 Unit(s) IV Push once  metoclopramide 10 milliGRAM(s) Oral three times a day before meals  NIFEdipine XL 60 milliGRAM(s) Oral two times a day    MEDICATIONS  (PRN):  dextrose Oral Gel 15 Gram(s) Oral once PRN Blood Glucose LESS THAN 70 milliGRAM(s)/deciliter      Exam:  Vitals:  T(C): 36.9 (05-01-24 @ 23:59), Max: 37.6 (05-01-24 @ 22:15)  T(F): 98.5 (05-01-24 @ 23:59), Max: 99.6 (05-01-24 @ 22:15)  HR: 92 (05-01-24 @ 23:59) (58 - 92)  BP: 194/77 (05-01-24 @ 23:59) (129/98 - 199/69)  RR: 18 (05-01-24 @ 23:59) (18 - 20)  SpO2: 96% (05-01-24 @ 23:30) (94% - 98%)    I&O's Summary    01 May 2024 07:01  -  02 May 2024 01:51  --------------------------------------------------------  IN: 400 mL / OUT: 691 mL / NET: -291 mL      General:  ~01:15 5/2/24  Dialysis ongoing  Patient found eyes closed with nonrhythmic twitching in all 4 extremities most prominent in the left upper extremity.  In the left upper extremity twitching manifested as slight elbow extension moving from approximately 5-10 degrees flexed to fully extended.  This would occur sporadically sometimes as a single movement, sometimes in bursts of up to 3/second for 1 second sometimes not occurring for several seconds at a time.   Right upper extremity twitching somewhat less pronounced in both amplitude and frequency but otherwise similar.  There was occasional bilateral elbow flexion coinciding with bilateral hip flexion and wrist when patient would cough. Sometimes hip flexion occuring without cough or arm flexion.  Twitching in the lower extremities was of the bilateral quadriceps often without significant movement.  Also nonrhythmic/sporadic.  Often gargling.  Occasionally coughing.  Tearing from the bilateral eyes  Mental status: Eyes closed.  Eyes would open briefly to strong central noxious stimuli (not always reproducibly), quickly closing again afterwards.  Does not follow any commands.  No verbal output.  Cranial nerves.  Pupils with very mild anisocoria right bigger than left, likely within physiological limitations.  Minimally reactive to light.  +Dolls eyes, corneals intact  No facial asymmetry  Motor: Moves all extremities when coughing.  Hands are Tightly closed with a  that cannot be overcome bilaterally.  Left arm has increased tone and remains mostly to completely extended at the elbow.  Increased tone in the right arm as well.  Sensory: Withdraws to noxious in the bilateral lower extremities, no grimace or withdrawal or movement to noxious in the bilateral upper extremities  Reflexes: 2+ in the bilateral triceps, biceps, 1+ in the bilateral brachioradialis.  Not elicited in patella or Achilles bilaterally.  Hands are tonically contracted-unable to assess for Chuck.  Toes are tonically downgoing, no change on assessment for plantar response.  Coordination: Unable to assess due to mental status`  Gait: Unable to assess due to mental status      Objective Studies  Labs:             13.2   13.32 )-----------( 134      ( 01 May 2024 22:42 )             40.9   136  |  92<L>  |  47<H>  ----------------------------<  117<H>  5.2   |  22  |  7.58<H>    Ca    9.0      01 May 2024 22:42  Phos  6.0     05-01  Mg     2.70     05-01    TPro  8.0  /  Alb  4.4  /  TBili  0.6  /  DBili  x   /  AST  10  /  ALT  8   /  AlkPhos  94  05-01  Urinalysis Basic - ( 01 May 2024 22:42 )  Color: x / Appearance: x / SG: x / pH: x  Gluc: 117 mg/dL / Ketone: x  / Bili: x / Urobili: x   Blood: x / Protein: x / Nitrite: x   Leuk Esterase: x / RBC: x / WBC x   Sq Epi: x / Non Sq Epi: x / Bacteria: x  Lactate Trend  CAPILLARY BLOOD GLUCOSE  POCT Blood Glucose.: 122 mg/dL (02 May 2024 01:12)    CNS Imaging:  CT Head No Cont:  (01 May 2024 18:11)  < from: CT Head No Cont (05.01.24 @ 18:11) >    ACC: [id number] EXAM:  CT BRAIN   ORDERED BY: SHELBY MOREL     PROCEDURE DATE:  05/01/2024          INTERPRETATION:  CT OF THE HEAD WITHOUT CONTRAST    CLINICAL INDICATION: Lethargy.    TECHNIQUE: Volumetric CT acquisition was performed through the brain and   reviewed using brain and bone window technique.      COMPARISON: CT head from 1/17/2024    FINDINGS:    The ventricular and sulcal size and configuration is age appropriate.     There is no acute loss of gray-white differentiation. Stable bilateral   inferior frontal encephalomalacia and gliosis likely related to remote   trauma.    There is no evidence of mass effect, midline shift, acute intracranial   hemorrhage, or extra-axial collections.     The calvarium is intact. The paranasalsinuses are clear.The mastoid air   cells are predominantly clear. The orbits are unremarkable.      IMPRESSION:  No acute intracranial hemorrhage or acute territorial infarction. Chronic   findings as above.    --- End of Report ---          GILDARDO KHAN; Resident Radiologist  This document has been electronically signed.  LADARIUS DENNY MD; Attending Radiologist  This document has been electronically signed. May  1 2024  7:54PM    < end of copied text >           ~~~~~~~~~~~~~~~~~~~~~~~~~~~~~~~~~~~~~~~~~~~~~~~~~~  Neurology Service   [facility] Consult [phone], Spectra [phone]  [facility] General Neurology- Spectra [id number]  [facility] Epilepsy Monitoring Unit- Spectra   [facility] Stroke Service- spectra [phone]  [facility] Consult Service [phone], [phone]  [facility] Stroke Consult Service , [phone] (7a-7p, otherwise call [phone])  ~~~~~~~~~~~~~~~~~~~~~~~~~~~~~~~~~~~~~~~~~~~~~~~~~~    History:        HPI:   73-year-old unknown handed man with past medical history of hypertension, ESRD on dialysis IDDM, presented 4/29/2024 with 2 days of hiccups and mild chest pain.  At that time, he denied shortness of breath, lightheadedness, dizziness, potation's, syncope. ED course: Vital signs stable.  EKG/telemetry was notable for hyperacute T waves and bradycardia.  Labs were notable for neutrophilic predominant leukocytosis, microcytic anemia, thrombocytopenia.  Potassium was normal.  Creatinine 6/BUN 43.  Hypermagnesia, hyperphosphatemia, elevated cardiac enzymes.normal lactate and normal pH.      Course has been notable for hypoglycemia, asymptomatic sinus bradycardia, loss and reestablishment of dialysis access, resolution and recurrence of leukocytosis again with neutrophilic predominance.  No infectious workup or antimicrobials during this admission (BCx now ordered).  Cardiology recommended nuclear stress test for atypical angina, was awaiting resolution of electrolyte derangements.  Nephrology recommended to calcium acetate for hyperphosphatemia.  CT chest on pelvis showed distended stomach thought to be due to gastroparesis for which medical provide was started. He was started on baclofen 5 mg 4/29/2024 evening through 5/1/2024~6 AM.  Required 1 mg of Haldol 4/30 ~2200 (indication unclear).  Noted by primary team to be awake and interactive throughout hospitalization. AO4 on arrival, more confused AO3 on the floor, AO2 following day, now not awake. Per documentation however  Baseline awake alert and oriented x 2, ambulatory and conversational.  5/1/2020 was pulling blanket overhead and outlined to engage with cardiology.  Later could not be awoken by dietitian.  Noted to be awake but not oriented and confused during dialysis, dialysis was complicated by hypoglycemia to 76 treated with dextrose.  He only tolerated 1 hour of dialysis.  Rapid response was called in the afternoon for altered mental status after wife noted eyes were rolled back and and he was shaking..  Labs continue to show electrolyte derangements.  CT head was recommended and showed no acute pathology.    Additional rapid response in the evening.  RRT note reflects the patient was withdrawing all 4 extremities to pain.  Pupils were sluggish but reactive.  Labs with mild uremia no hypercarbia.  He required suction for copious secretions.  Vitals with hypertension.  RRT team with concern for baclofen toxicity given 10 mg 3 times daily dosing in the setting of ESRD. Neurology consulted for altered mental status.    Review of Systems:    Unable to obtain    PMHx:  Diabetes  Stage 5 chronic kidney disease not on chronic dialysis  Benign essential HTN  HLD (hyperlipidemia)  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  ESRD on hemodialysis    Social History:  Unable to obtain    Medications  Home Medications:  atorvastatin 20 mg oral tablet: 1 tab(s) orally once a day (29 Apr 2024 21:17)  calamine topical lotion: 1 Apply topically to affected area 3 times a day As needed Itching (29 Apr 2024 21:17)  Coreg 25 mg oral tablet: 0.5 tab(s) orally 2 times a day 12.5 mg two times a day (29 Apr 2024 21:17)    MEDICATIONS  (STANDING):  atorvastatin 20 milliGRAM(s) Oral at bedtime  calcium acetate 667 milliGRAM(s) Oral three times a day with meals  carvedilol 12.5 milliGRAM(s) Oral every 12 hours  chlorhexidine 2% Cloths 1 Application(s) Topical <User Schedule>  dextrose 10% Bolus 125 milliLiter(s) IV Bolus once  dextrose 5%. 1000 milliLiter(s) (50 mL/Hr) IV Continuous <Continuous>  dextrose 5%. 1000 milliLiter(s) (100 mL/Hr) IV Continuous <Continuous>  dextrose 50% Injectable 12.5 Gram(s) IV Push once  dextrose 50% Injectable 25 Gram(s) IV Push once  dextrose 50% Injectable 50 milliLiter(s) IV Push once  epoetin reilly (EPOGEN) Injectable 04989 Unit(s) IV Push <User Schedule>  glucagon  Injectable 1 milliGRAM(s) IntraMuscular once  heparin   Injectable 5000 Unit(s) SubCutaneous every 12 hours  hydrALAZINE 100 milliGRAM(s) Oral three times a day  hydrALAZINE Injectable 10 milliGRAM(s) IV Push once  insulin glargine Injectable (LANTUS) 6 Unit(s) SubCutaneous at bedtime  insulin lispro (ADMELOG) corrective regimen sliding scale   SubCutaneous three times a day before meals  insulin lispro Injectable (ADMELOG) 2 Unit(s) SubCutaneous three times a day before meals  insulin regular  human recombinant 5 Unit(s) IV Push once  metoclopramide 10 milliGRAM(s) Oral three times a day before meals  NIFEdipine XL 60 milliGRAM(s) Oral two times a day    MEDICATIONS  (PRN):  dextrose Oral Gel 15 Gram(s) Oral once PRN Blood Glucose LESS THAN 70 milliGRAM(s)/deciliter      Exam:  Vitals:  T(C): 36.9 (05-01-24 @ 23:59), Max: 37.6 (05-01-24 @ 22:15)  T(F): 98.5 (05-01-24 @ 23:59), Max: 99.6 (05-01-24 @ 22:15)  HR: 92 (05-01-24 @ 23:59) (58 - 92)  BP: 194/77 (05-01-24 @ 23:59) (129/98 - 199/69)  RR: 18 (05-01-24 @ 23:59) (18 - 20)  SpO2: 96% (05-01-24 @ 23:30) (94% - 98%)    I&O's Summary    01 May 2024 07:01  -  02 May 2024 01:51  --------------------------------------------------------  IN: 400 mL / OUT: 691 mL / NET: -291 mL      General:  ~01:15 5/2/24  Dialysis ongoing  Patient found eyes closed with nonrhythmic twitching in all 4 extremities most prominent in the left upper extremity.  In the left upper extremity twitching manifested as slight elbow extension moving from approximately 5-10 degrees flexed to fully extended.  This would occur sporadically sometimes as a single movement, sometimes in bursts of up to 3/second for 1 second sometimes not occurring for several seconds at a time.   Right upper extremity twitching somewhat less pronounced in both amplitude and frequency but otherwise similar.  There was occasional bilateral elbow flexion coinciding with bilateral hip flexion and wrist when patient would cough. Sometimes hip flexion occuring without cough or arm flexion.  Twitching in the lower extremities was of the bilateral quadriceps often without significant movement.  Also nonrhythmic/sporadic.  Often gargling.  Occasionally coughing.  Tearing from the bilateral eyes  Mental status: Eyes closed.  Eyes would open briefly to strong central noxious stimuli (not always reproducibly), quickly closing again afterwards.  Does not follow any commands.  No verbal output.  Cranial nerves.  Pupils with very mild anisocoria right bigger than left, likely within physiological limitations.  Minimally reactive to light.  +Dolls eyes, corneals intact  No facial asymmetry  Motor: Moves all extremities when coughing.  Hands are Tightly closed with a  that cannot be overcome bilaterally.  Left arm has increased tone and remains mostly to completely extended at the elbow.  Increased tone in the right arm as well.  Sensory: Withdraws to noxious in the bilateral lower extremities, no grimace or withdrawal or movement to noxious in the bilateral upper extremities  Reflexes: 2+ in the bilateral triceps, biceps, 1+ in the bilateral brachioradialis.  Not elicited in patella or Achilles bilaterally.  Hands are tonically contracted-unable to assess for Chuck.  Toes are tonically downgoing, no change on assessment for plantar response.  Coordination: Unable to assess due to mental status`  Gait: Unable to assess due to mental status      Objective Studies  Labs:             13.2   13.32 )-----------( 134      ( 01 May 2024 22:42 )             40.9   136  |  92<L>  |  47<H>  ----------------------------<  117<H>  5.2   |  22  |  7.58<H>    Ca    9.0      01 May 2024 22:42  Phos  6.0     05-01  Mg     2.70     05-01    TPro  8.0  /  Alb  4.4  /  TBili  0.6  /  DBili  x   /  AST  10  /  ALT  8   /  AlkPhos  94  05-01  Urinalysis Basic - ( 01 May 2024 22:42 )  Color: x / Appearance: x / SG: x / pH: x  Gluc: 117 mg/dL / Ketone: x  / Bili: x / Urobili: x   Blood: x / Protein: x / Nitrite: x   Leuk Esterase: x / RBC: x / WBC x   Sq Epi: x / Non Sq Epi: x / Bacteria: x  Lactate Trend  CAPILLARY BLOOD GLUCOSE  POCT Blood Glucose.: 122 mg/dL (02 May 2024 01:12)    C[facility] Imaging:  CT Head No Cont:  (01 May 2024 18:11)  < from: CT Head No Cont (05.01.24 @ 18:11) >    ACC: [id number] EXAM:  CT BRAIN   ORDERED BY: SHELBY MOREL     PROCEDURE DATE:  05/01/2024          INTERPRETATION:  CT OF THE HEAD WITHOUT CONTRAST    CLINICAL INDICATION: Lethargy.    TECHNIQUE: Volumetric CT acquisition was performed through the brain and   reviewed using brain and bone window technique.      COMPARISON: CT head from 1/17/2024    FINDINGS:    The ventricular and sulcal size and configuration is age appropriate.     There is no acute loss of gray-white differentiation. Stable bilateral   inferior frontal encephalomalacia and gliosis likely related to remote   trauma.    There is no evidence of mass effect, midline shift, acute intracranial   hemorrhage, or extra-axial collections.     The calvarium is intact. The paranasalsinuses are clear.The mastoid air   cells are predominantly clear. The orbits are unremarkable.      IMPRESSION:  No acute intracranial hemorrhage or acute territorial infarction. Chronic   findings as above.    --- End of Report ---          GILDARDO KHAN; Resident Radiologist  This document has been electronically signed.  LADARIUS DENNY MD; Attending Radiologist  This document has been electronically signed. May  1 2024  7:54PM    < end of copied text >

## 2024-05-02 NOTE — PROGRESS NOTE ADULT - ASSESSMENT
73-year-old male past medical history hypertension, ESRD on dialysis Monday Wednesday Friday, diabetes insulin-dependent presents with hiccups patient endorses persistent hiccups for the last 2 days.   found to have peaked T waves, a new finding. denied dizziness, N/V, denies CP, palps, abd pain  Upon admission seen by CArd, renal and GI  found to have a distended GB prob 2/2 gastroparesis, was started on regaln  has received 4 doses of baclofen since 4/30 2/2 hiccups, last dose on 5/1 at 5 am  had received Haldol for agitation at 11 pm on 4/30, AMS observed in pm of 5/1  AMS ongoing, RRT x 2 called  now transferred to MICU for airway protection   intubated for airway protection, on propofol and pressors.   HD was not done timely until femoral shiley was placed 2/2 clotted RUE AVF.   has been nonverbal since pm 5/1.   HD as per renal, last on 5/1      NEUROLOGY     - CTH without acute findings   - neurology w/ concerns for meningoencephalitis; LP done, initial result NEG  - obtain MRI brain and vEEG    CARDIOVASCULAR     - ptn never had CP. just peaked T waves. was awaiting ischemic study w nucl stress test  - TTE showing EF 72%  - pending NST with Cards once improvement in clinical status   - cw coreg, hydralazine, and nifedipine     GI  - diet as per MICU team  - Gastroparesis   - cw reglan     RENAL   - ESRD MWF  - cw HD with R fem shiley  - pending fistula study of RUE (failed previous HD session)      INFECTIOUS DISEASE   - new leukocytosis, r/o sepsis  - pan scan  of C/A/P on admission was neg  - check blood cx    ENDOCRINE   - DM  -cw lantus 6U and admelog 2U  - cw ISS    [place] [facility] [place]  Full code

## 2024-05-02 NOTE — PROGRESS NOTE ADULT - SUBJECTIVE AND OBJECTIVE BOX
Patient is a 73y old  Male who presents with a chief complaint of Unstable angina, abn EKG (02 May 2024 15:45)      SUBJECTIVE / OVERNIGHT EVENTS: ptn transferred to MICU 2/2 AMS and intubated for airway protection, on propofol and pressors. HD was not done timely until femoral shiley was placed 2/2 clotted RUE AVF. mental status changed after a dose of Haldol for agitation. has arden nonverbal since. HD as per renal, last on 5/1    MEDICATIONS  (STANDING):  chlorhexidine 0.12% Liquid 15 milliLiter(s) Oral Mucosa every 12 hours  chlorhexidine 2% Cloths 1 Application(s) Topical <User Schedule>  dextrose 10% Bolus 125 milliLiter(s) IV Bolus once  dextrose 5%. 1000 milliLiter(s) (50 mL/Hr) IV Continuous <Continuous>  dextrose 5%. 1000 milliLiter(s) (100 mL/Hr) IV Continuous <Continuous>  dextrose 50% Injectable 25 Gram(s) IV Push once  dextrose 50% Injectable 12.5 Gram(s) IV Push once  dextrose 50% Injectable 50 milliLiter(s) IV Push once  epoetin reilly (EPOGEN) Injectable 31751 Unit(s) IV Push <User Schedule>  glucagon  Injectable 1 milliGRAM(s) IntraMuscular once  heparin   Injectable 5000 Unit(s) SubCutaneous every 12 hours  insulin lispro (ADMELOG) corrective regimen sliding scale   SubCutaneous every 6 hours  norepinephrine Infusion 0.1 MICROgram(s)/kG/Min (9.87 mL/Hr) IV Continuous <Continuous>  petrolatum Ophthalmic Ointment 1 Application(s) Both EYES two times a day  propofol Infusion 20 MICROgram(s)/kG/Min (6.31 mL/Hr) IV Continuous <Continuous>    MEDICATIONS  (PRN):  dextrose Oral Gel 15 Gram(s) Oral once PRN Blood Glucose LESS THAN 70 milliGRAM(s)/deciliter      Vital Signs Last 24 Hrs  T(F): 99.4 (05-02-24 @ 16:00), Max: 100.2 (05-02-24 @ 05:00)  HR: 61 (05-02-24 @ 16:00) (47 - 108)  BP: 134/35 (05-02-24 @ 15:00) (83/60 - 195/67)  RR: 18 (05-02-24 @ 16:00) (12 - 24)  SpO2: 100% (05-02-24 @ 16:00) (92% - 100%)  Telemetry:   CAPILLARY BLOOD GLUCOSE      POCT Blood Glucose.: 185 mg/dL (02 May 2024 12:59)  POCT Blood Glucose.: 157 mg/dL (02 May 2024 05:30)  POCT Blood Glucose.: 112 mg/dL (02 May 2024 03:10)  POCT Blood Glucose.: 122 mg/dL (02 May 2024 01:12)  POCT Blood Glucose.: 151 mg/dL (02 May 2024 00:05)  POCT Blood Glucose.: 127 mg/dL (01 May 2024 22:25)    I&O's Summary    01 May 2024 07:01  -  02 May 2024 07:00  --------------------------------------------------------  IN: 900 mL / OUT: 2241 mL / NET: -1341 mL    02 May 2024 07:01  -  02 May 2024 17:09  --------------------------------------------------------  IN: 124.2 mL / OUT: 0 mL / NET: 124.2 mL        PHYSICAL EXAM:  GENERAL: NAD, well-developed  HEAD:  Atraumatic, Normocephalic  EYES: EOMI, PERRLA, conjunctiva and sclera clear  NECK: Supple, No JVD  CHEST/LUNG: Clear to auscultation bilaterally; No wheeze  HEART: Regular rate and rhythm; No murmurs, rubs, or gallops  ABDOMEN: Soft, Nontender, Nondistended; Bowel sounds present  EXTREMITIES:  2+ Peripheral Pulses, No clubbing, cyanosis, or edema  PSYCH: AAOx3  NEUROLOGY: non-focal  SKIN: No rashes or lesions    LABS:                        13.3   24.85 )-----------( 101      ( 02 May 2024 03:37 )             41.2     05-02    138  |  95<L>  |  28<H>  ----------------------------<  114<H>  4.4   |  19<L>  |  4.98<H>    Ca    8.9      02 May 2024 03:37  Phos  3.7     05-02  Mg     2.30     05-02    TPro  8.0  /  Alb  4.4  /  TBili  0.6  /  DBili  x   /  AST  10  /  ALT  8   /  AlkPhos  94  05-01          Urinalysis Basic - ( 02 May 2024 03:37 )    Color: x / Appearance: x / SG: x / pH: x  Gluc: 114 mg/dL / Ketone: x  / Bili: x / Urobili: x   Blood: x / Protein: x / Nitrite: x   Leuk Esterase: x / RBC: x / WBC x   Sq Epi: x / Non Sq Epi: x / Bacteria: x        RADIOLOGY & ADDITIONAL TESTS:    Imaging Personally Reviewed:    Consultant(s) Notes Reviewed:      Care Discussed with Consultants/Other Providers:

## 2024-05-02 NOTE — CONSULT NOTE ADULT - SUBJECTIVE AND OBJECTIVE BOX
Vascular Surgery Consult    Consulting attending: Levy    HPI:  73-year-old male past medical history hypertension, ESRD on dialysis Monday Wednesday Friday, diabetes insulin-dependent presents with hiccups patient endorses persistent hiccups for the last 2 days. found to have peaked T waves, a new finding. denies dizziness, N/V, denies CP, palps, abd pain (29 Apr 2024 21:15) Today patient only tolerated 1 hr of HD due to issues with the venous cannula during HD. Later a RRT called in the PM for AMS. Patient was found to be AOx1 and lethargic but following commands. Vascular was consulted for emergent HD. On examination patient was AOx0, unresponsive to sternal rub. RRT called for AMS. Patient noted to be hypertensive to 200/100 and tachycardic and well saturated on room air. Airway and breathing intact. Medical team bedside made decision to get labs and prepare patient for urgent dialysis. Family called and notified about Shiley placement in groin and need for urgent HD. Shiley placed.      PAST MEDICAL HISTORY:  Diabetes    Stage 5 chronic kidney disease not on chronic dialysis    Benign essential HTN    HLD (hyperlipidemia)    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    ESRD on hemodialysis        PAST SURGICAL HISTORY:  No significant past surgical history    AVF (arteriovenous fistula)    Arteriovenous fistula    ALLERGIES:  No Known Allergies      VITALS & I/Os:  Vital Signs Last 24 Hrs  T(C): 36.7 (01 May 2024 20:32), Max: 36.9 (01 May 2024 06:00)  T(F): 98 (01 May 2024 20:32), Max: 98.4 (01 May 2024 06:00)  HR: 65 (01 May 2024 20:32) (56 - 70)  BP: 162/72 (01 May 2024 20:32) (129/98 - 199/69)  RR: 18 (01 May 2024 20:32) (18 - 20)  SpO2: 95% (01 May 2024 20:32) (94% - 98%)    Parameters below as of 01 May 2024 20:32  Patient On (Oxygen Delivery Method): room air    I&O's Summary    01 May 2024 07:01  -  02 May 2024 00:05  --------------------------------------------------------  IN: 400 mL / OUT: 691 mL / NET: -291 mL      PHYSICAL EXAM:  General: No acute distress, laying in bed snoring, AOx0, unresponsive  Respiratory: Nonlabored on room air  Cardiovascular: tachycardic  Abdominal: Soft, nondistended, nontender.  Extremities: Warm, spontaneously moving limbs, palpable distal pulses      LABS:                        13.2   13.32 )-----------( 134      ( 01 May 2024 22:42 )             40.9     05-01    136  |  92<L>  |  47<H>  ----------------------------<  117<H>  5.2   |  22  |  7.58<H>    Ca    9.0      01 May 2024 22:42  Phos  6.0     05-01  Mg     2.70     05-01    TPro  8.0  /  Alb  4.4  /  TBili  0.6  /  DBili  x   /  AST  10  /  ALT  8   /  AlkPhos  94  05-01    Lactate:  05-01 @ 22:42  1.7    Urinalysis Basic - ( 01 May 2024 22:42 )    Color: x / Appearance: x / SG: x / pH: x  Gluc: 117 mg/dL / Ketone: x  / Bili: x / Urobili: x   Blood: x / Protein: x / Nitrite: x   Leuk Esterase: x / RBC: x / WBC x   Sq Epi: x / Non Sq Epi: x / Bacteria: x      IMAGING:

## 2024-05-02 NOTE — PROGRESS NOTE ADULT - SUBJECTIVE AND OBJECTIVE BOX
Tiffany Martínez, PGY1    Patient is a 73y old  Male who presents with a chief complaint of Unstable angina, abn EKG (02 May 2024 07:56)      SUBJECTIVE / OVERNIGHT EVENTS: NAEO. Pt denies chest pain, SOB, N/V, fever/chills, or changes in bowel movements.    MEDICATIONS  (STANDING):  atorvastatin 20 milliGRAM(s) Oral at bedtime  calcium acetate 667 milliGRAM(s) Oral three times a day with meals  carvedilol 12.5 milliGRAM(s) Oral every 12 hours  chlorhexidine 2% Cloths 1 Application(s) Topical <User Schedule>  dextrose 10% Bolus 125 milliLiter(s) IV Bolus once  dextrose 5%. 1000 milliLiter(s) (50 mL/Hr) IV Continuous <Continuous>  dextrose 5%. 1000 milliLiter(s) (100 mL/Hr) IV Continuous <Continuous>  dextrose 50% Injectable 12.5 Gram(s) IV Push once  dextrose 50% Injectable 50 milliLiter(s) IV Push once  dextrose 50% Injectable 25 Gram(s) IV Push once  epoetin reilly (EPOGEN) Injectable 64711 Unit(s) IV Push <User Schedule>  glucagon  Injectable 1 milliGRAM(s) IntraMuscular once  heparin   Injectable 5000 Unit(s) SubCutaneous every 12 hours  hydrALAZINE 100 milliGRAM(s) Oral three times a day  insulin lispro (ADMELOG) corrective regimen sliding scale   SubCutaneous every 6 hours  insulin regular  human recombinant 5 Unit(s) IV Push once  metoclopramide 10 milliGRAM(s) Oral three times a day before meals  NIFEdipine XL 60 milliGRAM(s) Oral two times a day    MEDICATIONS  (PRN):  dextrose Oral Gel 15 Gram(s) Oral once PRN Blood Glucose LESS THAN 70 milliGRAM(s)/deciliter      CAPILLARY BLOOD GLUCOSE      POCT Blood Glucose.: 157 mg/dL (02 May 2024 05:30)  POCT Blood Glucose.: 112 mg/dL (02 May 2024 03:10)  POCT Blood Glucose.: 122 mg/dL (02 May 2024 01:12)  POCT Blood Glucose.: 151 mg/dL (02 May 2024 00:05)  POCT Blood Glucose.: 127 mg/dL (01 May 2024 22:25)  POCT Blood Glucose.: 102 mg/dL (01 May 2024 16:13)  POCT Blood Glucose.: 196 mg/dL (01 May 2024 12:31)  POCT Blood Glucose.: 76 mg/dL (01 May 2024 12:01)  POCT Blood Glucose.: 95 mg/dL (01 May 2024 11:04)  POCT Blood Glucose.: 173 mg/dL (01 May 2024 10:09)  POCT Blood Glucose.: 118 mg/dL (01 May 2024 08:42)    I&O's Summary    01 May 2024 07:01  -  02 May 2024 07:00  --------------------------------------------------------  IN: 900 mL / OUT: 2241 mL / NET: -1341 mL        Vital Signs Last 24 Hrs  T(C): 37.1 (02 May 2024 08:00), Max: 37.9 (02 May 2024 05:00)  T(F): 98.8 (02 May 2024 08:00), Max: 100.2 (02 May 2024 05:00)  HR: 73 (02 May 2024 08:00) (60 - 108)  BP: 152/77 (02 May 2024 08:00) (96/81 - 199/69)  BP(mean): 99 (02 May 2024 08:00) (89 - 101)  RR: 14 (02 May 2024 08:00) (14 - 23)  SpO2: 100% (02 May 2024 08:00) (92% - 100%)    Parameters below as of 02 May 2024 08:00  Patient On (Oxygen Delivery Method): room air        PHYSICAL EXAM:  GENERAL: NAD, well-developed, well-nourished  HEAD: Atraumatic, Normocephalic  EYES: Conjunctiva and sclera clear  CHEST/LUNG: Clear to auscultation bilaterally; No wheezes or crackles  HEART: Normal S1/S2; Regular rate and rhythm; No murmurs, rubs, or gallops  ABDOMEN: Soft, Nontender, Nondistended; Bowel sounds present  EXTREMITIES: No clubbing, cyanosis, or edema  PSYCH: A&Ox3      LABS:                        13.3   24.85 )-----------( 101      ( 02 May 2024 03:37 )             41.2      05-02    138  |  95<L>  |  28<H>  ----------------------------<  114<H>  4.4   |  19<L>  |  4.98<H>    Ca    8.9      02 May 2024 03:37  Phos  3.7     05-02  Mg     2.30     05-02    TPro  8.0  /  Alb  4.4  /  TBili  0.6  /  DBili  x   /  AST  10  /  ALT  8   /  AlkPhos  94  05-01          Urinalysis Basic - ( 02 May 2024 03:37 )    Color: x / Appearance: x / SG: x / pH: x  Gluc: 114 mg/dL / Ketone: x  / Bili: x / Urobili: x   Blood: x / Protein: x / Nitrite: x   Leuk Esterase: x / RBC: x / WBC x   Sq Epi: x / Non Sq Epi: x / Bacteria: x        RADIOLOGY & ADDITIONAL TESTS:    73M w/ PMH of HTN, ESRD (MWF), IDDM, p/f chest pain c/b hiccups for the last 2 days undergoing ischemic evaluation per cardiology. Pt s/p RRT x 2 for AMS. MICU consulted and accepting for airway monitoring in setting of ? baclofen toxicity.     NEUROLOGY   #AMS  - patient with multiple RRTs for AMS  - CTH without acute findings   - may be due to baclofen toxicity     PLAN  - follow up tox and neuro recs  - obtain MRI brain and vEEG  - cw HD for concern of baclofen toxicity       CARDIOVASCULAR     #Angina  - patient admitted with chest pain and noted to have peaked T waves on admission   - TTE showing EF 72%  - pending NST with Cards once improvement in clinical status     #HTN  - cw coreg, hydralazine, and nifedipine     RESPIRATORY   [facility]    GI/NUTRITION   #Gastroporesis   cw reglan     /RENAL   #ESRD MWF  - cw HD with R fem kolby  - pending fistula study of RUE (failed previous HD session)      INFECTIOUS DISEASE   #leukocytosis  - noted on repeat labs  - follow up infectious workup  - would monitor off antibiotics for now     ENDOCRINE   #IDD  -cw lantus 6U and admelog 2U  - cw ISS    HEMATOLOGY/ONCOLOGY  [facility]      DVT PPX  SQH     ETHICS  Full code Tiffany Martínez, PGY1    Patient is a 73y old  Male who presents with a chief complaint of Unstable angina, abn EKG (02 May 2024 07:56)      SUBJECTIVE / OVERNIGHT EVENTS: Transferred from MICU overnight. Plan to intubate this morning    MEDICATIONS  (STANDING):  atorvastatin 20 milliGRAM(s) Oral at bedtime  calcium acetate 667 milliGRAM(s) Oral three times a day with meals  carvedilol 12.5 milliGRAM(s) Oral every 12 hours  chlorhexidine 2% Cloths 1 Application(s) Topical <User Schedule>  dextrose 10% Bolus 125 milliLiter(s) IV Bolus once  dextrose 5%. 1000 milliLiter(s) (50 mL/Hr) IV Continuous <Continuous>  dextrose 5%. 1000 milliLiter(s) (100 mL/Hr) IV Continuous <Continuous>  dextrose 50% Injectable 12.5 Gram(s) IV Push once  dextrose 50% Injectable 50 milliLiter(s) IV Push once  dextrose 50% Injectable 25 Gram(s) IV Push once  epoetin reilly (EPOGEN) Injectable 55767 Unit(s) IV Push <User Schedule>  glucagon  Injectable 1 milliGRAM(s) IntraMuscular once  heparin   Injectable 5000 Unit(s) SubCutaneous every 12 hours  hydrALAZINE 100 milliGRAM(s) Oral three times a day  insulin lispro (ADMELOG) corrective regimen sliding scale   SubCutaneous every 6 hours  insulin regular  human recombinant 5 Unit(s) IV Push once  metoclopramide 10 milliGRAM(s) Oral three times a day before meals  NIFEdipine XL 60 milliGRAM(s) Oral two times a day    MEDICATIONS  (PRN):  dextrose Oral Gel 15 Gram(s) Oral once PRN Blood Glucose LESS THAN 70 milliGRAM(s)/deciliter      CAPILLARY BLOOD GLUCOSE      POCT Blood Glucose.: 157 mg/dL (02 May 2024 05:30)  POCT Blood Glucose.: 112 mg/dL (02 May 2024 03:10)  POCT Blood Glucose.: 122 mg/dL (02 May 2024 01:12)  POCT Blood Glucose.: 151 mg/dL (02 May 2024 00:05)  POCT Blood Glucose.: 127 mg/dL (01 May 2024 22:25)  POCT Blood Glucose.: 102 mg/dL (01 May 2024 16:13)  POCT Blood Glucose.: 196 mg/dL (01 May 2024 12:31)  POCT Blood Glucose.: 76 mg/dL (01 May 2024 12:01)  POCT Blood Glucose.: 95 mg/dL (01 May 2024 11:04)  POCT Blood Glucose.: 173 mg/dL (01 May 2024 10:09)  POCT Blood Glucose.: 118 mg/dL (01 May 2024 08:42)    I&O's Summary    01 May 2024 07:01  -  02 May 2024 07:00  --------------------------------------------------------  IN: 900 mL / OUT: 2241 mL / NET: -1341 mL        Vital Signs Last 24 Hrs  T(C): 37.1 (02 May 2024 08:00), Max: 37.9 (02 May 2024 05:00)  T(F): 98.8 (02 May 2024 08:00), Max: 100.2 (02 May 2024 05:00)  HR: 73 (02 May 2024 08:00) (60 - 108)  BP: 152/77 (02 May 2024 08:00) (96/81 - 199/69)  BP(mean): 99 (02 May 2024 08:00) (89 - 101)  RR: 14 (02 May 2024 08:00) (14 - 23)  SpO2: 100% (02 May 2024 08:00) (92% - 100%)    Parameters below as of 02 May 2024 08:00  Patient On (Oxygen Delivery Method): room air        PHYSICAL EXAM:  GENERAL: ill-appearing   HEAD:  Atraumatic, Normocephalic  EYES: conjunctiva and sclera clear  ENT: Moist mucous membranes  NECK: Supple, No JVD  CHEST/LUNG: + abnormal breathing w/ upper airways secretions   HEART: Regular rate and rhythm; No murmurs, rubs, or gallops  ABDOMEN: BSx4; Soft, nontender, nondistended  EXTREMITIES:   No clubbing, cyanosis, or edema  NERVOUS SYSTEM:  A&Ox0, no focal deficits   SKIN: No rashes or lesions        LABS:                        13.3   24.85 )-----------( 101      ( 02 May 2024 03:37 )             41.2      05-02    138  |  95<L>  |  28<H>  ----------------------------<  114<H>  4.4   |  19<L>  |  4.98<H>    Ca    8.9      02 May 2024 03:37  Phos  3.7     05-02  Mg     2.30     05-02    TPro  8.0  /  Alb  4.4  /  TBili  0.6  /  DBili  x   /  AST  10  /  ALT  8   /  AlkPhos  94  05-01          Urinalysis Basic - ( 02 May 2024 03:37 )    Color: x / Appearance: x / SG: x / pH: x  Gluc: 114 mg/dL / Ketone: x  / Bili: x / Urobili: x   Blood: x / Protein: x / Nitrite: x   Leuk Esterase: x / RBC: x / WBC x   Sq Epi: x / Non Sq Epi: x / Bacteria: x        RADIOLOGY & ADDITIONAL TESTS:

## 2024-05-02 NOTE — CONSULT NOTE ADULT - ASSESSMENT
Assessment  73M PMHx HTN, ESRD, IDDM p/w hiccups and chest pain found to have hyperacute T waves, started on baclofen. Neurology consulted for AMS. Latest vitals with /77, HR 92, afebrile. satting well on RA RR 18. Exam is stuporous with nonrhythmic diffuse movements c/f spasm vs myoclonus. Labs w/ neutrophilic predominant leukocytosis, microcytic anemia, severe renal impariment occasional hyperK, HyperPhos, hyperMg, +tropinemia. Ammonia not elevated. B12 715. Folate >20. No hypercarbia or acidosis. CTH with bifrontal encephalomalacia.     Impression:  Toxic Metabolic Encephalopathy  -Consideration for iatrogenic from baclofen toxicity.  However this usually presents with hypotonia and a flaccid paralysis.  Hyperreflexia and spasticity as seen in this patient can be seen in baclofen withdrawal. Myoclonus however can be seen in baclofen toxicity  -It is concerning to me that his pupils were reactive for RRT team and are no longer. Baclofen toxicity can Supress brainstem reflexes and mimic brain death.    Recommendations  -Airway protection per primary team  -2 hour EEG, longer study maybe necessary pending prelim read  -Needs infectious workup  -MICU consult appreciated  -Tele,     Case not yet discussed with attending. Assessment  73M PMHx HTN, ESRD, IDDM p/w hiccups and chest pain found to have hyperacute T waves, started on baclofen. Neurology consulted for AMS. 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 Latest vitals with /77, HR 92, afebrile. satting well on RA RR 18. Exam is stuporous with nonrhythmic diffuse movements c/f spasm vs myoclonus. Labs w/ neutrophilic predominant leukocytosis, microcytic anemia, severe renal impariment occasional hyperK, HyperPhos, hyperMg, +tropinemia. Ammonia not elevated. B12 715. Folate >20. No hypercarbia or acidosis. CTH with bifrontal encephalomalacia.     Impression:  Toxic Metabolic Encephalopathy  Spasms vs myoclonus   Consideration for iatrogenic from baclofen toxicity.  However this usually presents with hypotonia and a flaccid paralysis.  Hyperreflexia and spasticity as seen in this patient can be seen in baclofen withdrawal. Myoclonus however can be seen in baclofen toxicity    Recommendations  -Airway protection per primary team  -MRI Brain with and without contrast  -2 hour EEG, longer study maybe necessary pending prelim read  -Needs infectious workup  -MICU consult appreciated  -Tele,     Case not yet discussed with attending. Assessment  73M PMHx HTN, ESRD, IDDM p/w hiccups and chest pain found to have hyperacute T waves, started on baclofen. Neurology consulted for AMS. 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 Latest vitals with /77, HR 92, afebrile. satting well on RA RR 18. Exam is stuporous with nonrhythmic diffuse movements c/f spasm vs myoclonus. Increased tone. Labs w/ neutrophilic predominant leukocytosis, microcytic anemia, severe renal impariment occasional hyperK, HyperPhos, hyperMg, +tropinemia. Ammonia not elevated. B12 715. Folate >20. No hypercarbia or acidosis. CTH with bifrontal encephalomalacia.     Impression:  Toxic Metabolic Encephalopathy  Spasms vs myoclonus   Consideration for iatrogenic from baclofen toxicity.  However this usually presents with hypotonia and a flaccid paralysis.  Spasticity as seen in this patient can be seen in baclofen withdrawal. Myoclonus however can be seen in baclofen toxicity    Recommendations  -Airway protection per primary team  -MRI Brain with and without contrast  -2 hour EEG, longer study maybe necessary pending prelim read  -Needs infectious workup  -MICU consult appreciated  -Tele,     Case not yet discussed with attending. Assessment  73M PMHx HTN, ESRD, IDDM p/w hiccups and chest pain found to have hyperacute T waves, started on baclofen. Neurology consulted for AMS. 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 Latest vitals with /77, HR 92, afebrile. satting well on RA RR 18. Exam is stuporous with nonrhythmic diffuse movements c/f spasm vs myoclonus. Increased tone. Labs w/ neutrophilic predominant leukocytosis, microcytic anemia, severe renal impariment occasional hyperK, HyperPhos, hyperMg, +tropinemia. Ammonia not elevated. B12 715. Folate >20. No hypercarbia or acidosis. CTH with bifrontal encephalomalacia.     Impression:  Toxic Metabolic Encephalopathy  Spasms vs myoclonus   Consideration for iatrogenic from baclofen toxicity.  However this usually presents with hypotonia and a flaccid paralysis.  Spasticity as seen in this patient can be seen in baclofen withdrawal. Myoclonus however can be seen in baclofen toxicity. Overall prudent to consider other causes for now as treatment for baclofen toxicity withdrawal is supportive  Given rapidly rising leukocytosis very concerned for meningitis/encephalitis     Recommendations  -Airway protection per primary team  -MRI Brain with and without contrast  -vEEG  -Needs infectious workup  -MICU consult appreciated  -Tele,   -Lumbar puncture   -Empiric antimicrobials for meningitis/encephalitis including antibiotics and acyclovir     Case discussed with attending.

## 2024-05-02 NOTE — RAPID RESPONSE TEAM SUMMARY - NSSITUATIONBACKGROUNDRRT_GEN_ALL_CORE
73 year old man with HTN, HLD, T2DM on insulin, and ESRD on HD presents with supply demand ischemia and angina and hiccups. RRT called for AMS.
73M PMH ESRD on HD, T2DM presenting with chest pain. Planned for nuclear stress test. RRT called for AMS. However, per primary team, pt has been alert/but not interactive through admission. Went for HD today for K 6.0, but only able to tolerate 1hour due to pt pulling at lines. Repeat K returned 5.5. Pt afebrile, BP 210s/100s, HR 80s. On exam, PERRL, initially not following commands, but moving all extremities, no facial droop noted. FSG >100. Pt with thick secretions, suctioned.

## 2024-05-02 NOTE — PROCEDURE NOTE - NSOPENPRESSURE_GEN_A_CORE
Physician Progress Note      Halie Romeo  [facility] #:                  [id number]  :                       1952  ADMIT DATE:       10/12/2023 10:11 PM  [address] [place] [address] DATE:        10/21/2023 4:17 PM  RESPONDING  PROVIDER #:        Natalee Martinez MD          QUERY TEXT:    Pt admitted for abd pain/N/V. Initial concern for UTI and antibiotics   discontinued on 10/17. Pt underwent EGD shows -? Gastritis, + gall stone, +   diabetic gastroparesis. Treated with PPI. HIDA scan negative. If possible,   please document in progress notes and discharge summary if you are evaluating   and /or treating any of the following: The medical record reflects the following:  Risk Factors: UTI    Clinical Indicators: Abd pain ,N/V, EGD shows -? Gastritis, + gall stone, +   diabetic gastroparesis. HIDA Scan negative    Treatment: PPI, EGD, IV Abx x5days  Options provided:  -- Abdominal Pain due to UTI  -- Abdominal Pain due to Gastritis  -- Abdominal Pain due to diabetic gastroparesis  -- Other - I will add my own diagnosis  -- Disagree - Not applicable / Not valid  -- Disagree - Clinically unable to determine / Unknown  -- Refer to Clinical Documentation Reviewer    PROVIDER RESPONSE TEXT:    This patient has abdominal pain due to diabetic gastroparesis. Query created by:  Miguelina Cho on 10/24/2023 1:06 PM      Electronically signed by:  Naatlee Martinez MD 10/25/2023 8:48 AM Measurement of opening pressure performed maintaining proper positioning of patient.

## 2024-05-02 NOTE — CHART NOTE - NSCHARTNOTEFT_GEN_A_CORE
MICU Accept Note    CHIEF COMPLAINT: AMS     HPI / INTERVAL HISTORY:  73-year-old male past medical history hypertension, ESRD on dialysis Monday Wednesday Friday, diabetes insulin-dependent presents with hiccups patient endorses persistent hiccups for the last 2 days. found to have peaked T waves, a new finding. denies dizziness, N/V, denies CP, palps, abd pain    RRT called on 5/1 PM for AMS. Per initial RRT note, pt had been alert but not interactive throughout admission with any specialists today. In addition, per RN Documentation 4/30 evening - pt neurological status varying between A&OX1 and A&Ox 0. PT was admitted for chest pain and Hiccups. Writer presented to bedside during RRT - On exam, PERRL, initially not following commands, but moving all extremities, no facial droop noted. FSG >100. Pt with thick secretions, suctioned. By end of rapid, pt following commands, moving all extremities. PT with elevated BP at rapid likely 2/2 pt maurilio arm, repeat BP 180s/100s. Per eMar, pt was given Haldol 1 mg IVP ~ 2200 4/30 and Baclofen ~ 6 am 5/1.     Second RRT called on 5/1 PM for AMS. During second RRT, pt AOx0, responsive to sternal rub, protecting airway while on RA. At conclusion of initial rapid pt was following commands, moving all extremities. Neurology consulted and recommended vEEG for 2 hours along with MRI w/w/o IV contrast.     Pt seen and examined at bedside after RRT x2. Pt AOx0. Limited ROS. Pt with significant secretions. B/l UE twitching noted. Limited response to pain. Will admit to MICU for airway monitoring.     PAST MEDICAL & SURGICAL HISTORY:  Diabetes      Benign essential HTN      HLD (hyperlipidemia)      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      ESRD on hemodialysis      Arteriovenous fistula          FAMILY HISTORY:  FHx: diabetes mellitus (Father, Aunt)        SOCIAL HISTORY:  Limited     HOME MEDICATIONS:      Allergies    No Known Allergies    Intolerances          REVIEW OF SYSTEMS:  Constitutional: No fevers, chills, weight loss, weight gain  HEENT: No vision problems, eye pain, nasal congestion, rhinorrhea, sore throat, dysphagia  CV: No chest pain, orthopnea, palpitations  Resp: No cough, dyspnea, wheezing, hemoptysis  GI: No nausea, vomiting, diarrhea, constipation, abdominal pain  : [ ] dysuria [ ] nocturia [ ] hematuria [ ] increased urinary frequency  Musculoskeletal: [ ] back pain [ ] myalgias [ ] arthralgias [ ] fracture  Skin: [ ] rash [ ] itch  Neurological: [ ] headache [ ] dizziness [ ] syncope [ ] weakness [ ] numbness  Psychiatric: [ ] anxiety [ ] depression  Endocrine: [ ] diabetes [ ] thyroid problem  Hematologic/Lymphatic: [ ] anemia [ ] bleeding problem  Allergic/Immunologic: [ ] itchy eyes [ ] nasal discharge [ ] hives [ ] angioedema  [ ] All other systems negative  [X] Unable to assess ROS because AMS    OBJECTIVE:  ICU Vital Signs Last 24 Hrs  T(C): 37.2 (02 May 2024 02:10), Max: 37.6 (01 May 2024 22:15)  T(F): 99 (02 May 2024 02:10), Max: 99.6 (01 May 2024 22:15)  HR: 91 (02 May 2024 02:10) (58 - 92)  BP: 158/81 (02 May 2024 02:10) (129/98 - 199/69)  BP(mean): --  ABP: --  ABP(mean): --  RR: 15 (02 May 2024 02:10) (15 - 20)  SpO2: 96% (01 May 2024 23:30) (94% - 98%)    O2 Parameters below as of 02 May 2024 02:10  Patient On (Oxygen Delivery Method): room air              05-01 @ 07:01  -  05-02 @ 03:37  --------------------------------------------------------  IN: 900 mL / OUT: 2241 mL / NET: -1341 mL      CAPILLARY BLOOD GLUCOSE      POCT Blood Glucose.: 112 mg/dL (02 May 2024 03:10)      PHYSICAL EXAM:  LOS: 3d    VITALS:   T(C): 37.2 (05-02-24 @ 02:10), Max: 37.6 (05-01-24 @ 22:15)  HR: 91 (05-02-24 @ 02:10) (58 - 92)  BP: 158/81 (05-02-24 @ 02:10) (129/98 - 199/69)  RR: 15 (05-02-24 @ 02:10) (15 - 20)  SpO2: 96% (05-01-24 @ 23:30) (94% - 98%)    GENERAL: ill-appearing   HEAD:  Atraumatic, Normocephalic  EYES: conjunctiva and sclera clear  ENT: Moist mucous membranes  NECK: Supple, No JVD  CHEST/LUNG: + abnormal breathing w/ upper airways secretions   HEART: Regular rate and rhythm; No murmurs, rubs, or gallops  ABDOMEN: BSx4; Soft, nontender, nondistended  EXTREMITIES:   No clubbing, cyanosis, or edema  NERVOUS SYSTEM:  A&Ox0, no focal deficits   SKIN: No rashes or lesions    LINES:     HOSPITAL MEDICATIONS:  MEDICATIONS  (STANDING):  atorvastatin 20 milliGRAM(s) Oral at bedtime  calcium acetate 667 milliGRAM(s) Oral three times a day with meals  carvedilol 12.5 milliGRAM(s) Oral every 12 hours  chlorhexidine 2% Cloths 1 Application(s) Topical <User Schedule>  dextrose 10% Bolus 125 milliLiter(s) IV Bolus once  dextrose 5%. 1000 milliLiter(s) (50 mL/Hr) IV Continuous <Continuous>  dextrose 5%. 1000 milliLiter(s) (100 mL/Hr) IV Continuous <Continuous>  dextrose 50% Injectable 12.5 Gram(s) IV Push once  dextrose 50% Injectable 25 Gram(s) IV Push once  dextrose 50% Injectable 50 milliLiter(s) IV Push once  epoetin reilly (EPOGEN) Injectable 72052 Unit(s) IV Push <User Schedule>  glucagon  Injectable 1 milliGRAM(s) IntraMuscular once  heparin   Injectable 5000 Unit(s) SubCutaneous every 12 hours  hydrALAZINE 100 milliGRAM(s) Oral three times a day  insulin glargine Injectable (LANTUS) 6 Unit(s) SubCutaneous at bedtime  insulin lispro (ADMELOG) corrective regimen sliding scale   SubCutaneous three times a day before meals  insulin lispro Injectable (ADMELOG) 2 Unit(s) SubCutaneous three times a day before meals  insulin regular  human recombinant 5 Unit(s) IV Push once  metoclopramide 10 milliGRAM(s) Oral three times a day before meals  NIFEdipine XL 60 milliGRAM(s) Oral two times a day    MEDICATIONS  (PRN):  dextrose Oral Gel 15 Gram(s) Oral once PRN Blood Glucose LESS THAN 70 milliGRAM(s)/deciliter      LABS:                        13.2   13.32 )-----------( 134      ( 01 May 2024 22:42 )             40.9     Hgb Trend: 13.2<--, 13.1<--, 11.8<--, 12.3<--  05-01    136  |  92<L>  |  47<H>  ----------------------------<  117<H>  5.2   |  22  |  7.58<H>    Ca    9.0      01 May 2024 22:42  Phos  6.0     05-01  Mg     2.70     05-01    TPro  8.0  /  Alb  4.4  /  TBili  0.6  /  DBili  x   /  AST  10  /  ALT  8   /  AlkPhos  94  05-01    Creatinine Trend: 7.58<--, 7.16<--, 9.02<--, 7.74<--, 6.28<--    Urinalysis Basic - ( 01 May 2024 22:42 )    Color: x / Appearance: x / SG: x / pH: x  Gluc: 117 mg/dL / Ketone: x  / Bili: x / Urobili: x   Blood: x / Protein: x / Nitrite: x   Leuk Esterase: x / RBC: x / WBC x   Sq Epi: x / Non Sq Epi: x / Bacteria: x        Venous Blood Gas:  05-01 @ 22:42  7.36/51/27/29/31.1  VBG Lactate: 1.7      MICROBIOLOGY:     RADIOLOGY & ADDITIONAL TESTS:      ASSESSMENT AND PLAN:  73M w/ PMH of HTN, ESRD (MWF), IDDM, p/f chest pain c/b hiccups for the last 2 days undergoing ischemic evaluation per cardiology. Pt s/p RRT x 2 for AMS. MICU consulted and accepting for airway monitoring in setting of ? baclofen toxicity.     NEUROLOGY     CARDIOVASCULAR     RESPIRATORY     GI/NUTRITION     /RENAL     SKIN     INFECTIOUS DISEASE     ENDOCRINE     HEMATOLOGY/ONCOLOGY     DVT PPX    ETHICS MICU Accept Note    CHIEF COMPLAINT: AMS     HPI / INTERVAL HISTORY:  73-year-old male past medical history hypertension, ESRD on dialysis Monday Wednesday Friday, diabetes insulin-dependent presents with hiccups patient endorses persistent hiccups for the last 2 days. found to have peaked T waves, a new finding. denies dizziness, N/V, denies CP, palps, abd pain    RRT called on 5/1 PM for AMS. Per initial RRT note, pt had been alert but not interactive throughout admission with any specialists today. In addition, per RN Documentation 4/30 evening - pt neurological status varying between A&OX1 and A&Ox 0. PT was admitted for chest pain and Hiccups. Writer presented to bedside during RRT - On exam, PERRL, initially not following commands, but moving all extremities, no facial droop noted. FSG >100. Pt with thick secretions, suctioned. By end of rapid, pt following commands, moving all extremities. PT with elevated BP at rapid likely 2/2 pt maurilio arm, repeat BP 180s/100s. Per eMar, pt was given Haldol 1 mg IVP ~ 2200 4/30 and Baclofen ~ 6 am 5/1.     Second RRT called on 5/1 PM for AMS. During second RRT, pt AOx0, responsive to sternal rub, protecting airway while on RA. At conclusion of initial rapid pt was following commands, moving all extremities. Neurology consulted and recommended vEEG for 2 hours along with MRI w/w/o IV contrast.     Pt seen and examined at bedside after RRT x2. Pt AOx0. Limited ROS. Pt with significant secretions. B/l UE twitching noted. Limited response to pain. Will admit to MICU for airway monitoring.     PAST MEDICAL & SURGICAL HISTORY:  Diabetes      Benign essential HTN      HLD (hyperlipidemia)      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      ESRD on hemodialysis      Arteriovenous fistula          FAMILY HISTORY:  FHx: diabetes mellitus (Father, Aunt)        SOCIAL HISTORY:  Limited     HOME MEDICATIONS:      Allergies    No Known Allergies    Intolerances          REVIEW OF SYSTEMS:  Constitutional: No fevers, chills, weight loss, weight gain  HEENT: No vision problems, eye pain, nasal congestion, rhinorrhea, sore throat, dysphagia  CV: No chest pain, orthopnea, palpitations  Resp: No cough, dyspnea, wheezing, hemoptysis  GI: No nausea, vomiting, diarrhea, constipation, abdominal pain  : [ ] dysuria [ ] nocturia [ ] hematuria [ ] increased urinary frequency  Musculoskeletal: [ ] back pain [ ] myalgias [ ] arthralgias [ ] fracture  Skin: [ ] rash [ ] itch  Neurological: [ ] headache [ ] dizziness [ ] syncope [ ] weakness [ ] numbness  Psychiatric: [ ] anxiety [ ] depression  Endocrine: [ ] diabetes [ ] thyroid problem  Hematologic/Lymphatic: [ ] anemia [ ] bleeding problem  Allergic/Immunologic: [ ] itchy eyes [ ] nasal discharge [ ] hives [ ] angioedema  [ ] All other systems negative  [X] Unable to assess ROS because AMS    OBJECTIVE:  ICU Vital Signs Last 24 Hrs  T(C): 37.2 (02 May 2024 02:10), Max: 37.6 (01 May 2024 22:15)  T(F): 99 (02 May 2024 02:10), Max: 99.6 (01 May 2024 22:15)  HR: 91 (02 May 2024 02:10) (58 - 92)  BP: 158/81 (02 May 2024 02:10) (129/98 - 199/69)  BP(mean): --  ABP: --  ABP(mean): --  RR: 15 (02 May 2024 02:10) (15 - 20)  SpO2: 96% (01 May 2024 23:30) (94% - 98%)    O2 Parameters below as of 02 May 2024 02:10  Patient On (Oxygen Delivery Method): room air              05-01 @ 07:01  -  05-02 @ 03:37  --------------------------------------------------------  IN: 900 mL / OUT: 2241 mL / NET: -1341 mL      CAPILLARY BLOOD GLUCOSE      POCT Blood Glucose.: 112 mg/dL (02 May 2024 03:10)      PHYSICAL EXAM:  LOS: 3d    VITALS:   T(C): 37.2 (05-02-24 @ 02:10), Max: 37.6 (05-01-24 @ 22:15)  HR: 91 (05-02-24 @ 02:10) (58 - 92)  BP: 158/81 (05-02-24 @ 02:10) (129/98 - 199/69)  RR: 15 (05-02-24 @ 02:10) (15 - 20)  SpO2: 96% (05-01-24 @ 23:30) (94% - 98%)    GENERAL: ill-appearing   HEAD:  Atraumatic, Normocephalic  EYES: conjunctiva and sclera clear  ENT: Moist mucous membranes  NECK: Supple, No JVD  CHEST/LUNG: + abnormal breathing w/ upper airways secretions   HEART: Regular rate and rhythm; No murmurs, rubs, or gallops  ABDOMEN: BSx4; Soft, nontender, nondistended  EXTREMITIES:   No clubbing, cyanosis, or edema  NERVOUS SYSTEM:  A&Ox0, no focal deficits   SKIN: No rashes or lesions    LINES:     HOSPITAL MEDICATIONS:  MEDICATIONS  (STANDING):  atorvastatin 20 milliGRAM(s) Oral at bedtime  calcium acetate 667 milliGRAM(s) Oral three times a day with meals  carvedilol 12.5 milliGRAM(s) Oral every 12 hours  chlorhexidine 2% Cloths 1 Application(s) Topical <User Schedule>  dextrose 10% Bolus 125 milliLiter(s) IV Bolus once  dextrose 5%. 1000 milliLiter(s) (50 mL/Hr) IV Continuous <Continuous>  dextrose 5%. 1000 milliLiter(s) (100 mL/Hr) IV Continuous <Continuous>  dextrose 50% Injectable 12.5 Gram(s) IV Push once  dextrose 50% Injectable 25 Gram(s) IV Push once  dextrose 50% Injectable 50 milliLiter(s) IV Push once  epoetin reilly (EPOGEN) Injectable 02878 Unit(s) IV Push <User Schedule>  glucagon  Injectable 1 milliGRAM(s) IntraMuscular once  heparin   Injectable 5000 Unit(s) SubCutaneous every 12 hours  hydrALAZINE 100 milliGRAM(s) Oral three times a day  insulin glargine Injectable (LANTUS) 6 Unit(s) SubCutaneous at bedtime  insulin lispro (ADMELOG) corrective regimen sliding scale   SubCutaneous three times a day before meals  insulin lispro Injectable (ADMELOG) 2 Unit(s) SubCutaneous three times a day before meals  insulin regular  human recombinant 5 Unit(s) IV Push once  metoclopramide 10 milliGRAM(s) Oral three times a day before meals  NIFEdipine XL 60 milliGRAM(s) Oral two times a day    MEDICATIONS  (PRN):  dextrose Oral Gel 15 Gram(s) Oral once PRN Blood Glucose LESS THAN 70 milliGRAM(s)/deciliter      LABS:                        13.2   13.32 )-----------( 134      ( 01 May 2024 22:42 )             40.9     Hgb Trend: 13.2<--, 13.1<--, 11.8<--, 12.3<--  05-01    136  |  92<L>  |  47<H>  ----------------------------<  117<H>  5.2   |  22  |  7.58<H>    Ca    9.0      01 May 2024 22:42  Phos  6.0     05-01  Mg     2.70     05-01    TPro  8.0  /  Alb  4.4  /  TBili  0.6  /  DBili  x   /  AST  10  /  ALT  8   /  AlkPhos  94  05-01    Creatinine Trend: 7.58<--, 7.16<--, 9.02<--, 7.74<--, 6.28<--    Urinalysis Basic - ( 01 May 2024 22:42 )    Color: x / Appearance: x / SG: x / pH: x  Gluc: 117 mg/dL / Ketone: x  / Bili: x / Urobili: x   Blood: x / Protein: x / Nitrite: x   Leuk Esterase: x / RBC: x / WBC x   Sq Epi: x / Non Sq Epi: x / Bacteria: x        Venous Blood Gas:  05-01 @ 22:42  7.36/51/27/29/31.1  VBG Lactate: 1.7      MICROBIOLOGY:     RADIOLOGY & ADDITIONAL TESTS:      ASSESSMENT AND PLAN:  73M w/ PMH of HTN, ESRD (MWF), IDDM, p/f chest pain c/b hiccups for the last 2 days undergoing ischemic evaluation per cardiology. Pt s/p RRT x 2 for AMS. MICU consulted and accepting for airway monitoring in setting of ? baclofen toxicity.     NEUROLOGY   #AMS  - patient with multiple RRTs for AMS  - CTH without acute findings   - may be due to baclofen toxicity     PLAN  - follow up tox and neuro recs  - obtain MRI brain and vEEG  - cw HD for concern of baclofen toxicity       CARDIOVASCULAR     #Angina  - patient admitted with chest pain and noted to have peaked T waves on admission   - TTE showing EF 72%  - pending NST with Cards once improvement in clinical status     #HTN  - cw coreg, hydralazine, and nifedipine     RESPIRATORY   FELIX    GI/NUTRITION   #Gastroporesis   cw reglan     /RENAL   #ESRD MWF  - cw HD      INFECTIOUS DISEASE   #leukocytosis  - noted on repeat labs  - follow up infectious workup  - would monitor off antibiotics for now     ENDOCRINE   #IDD  -cw lantus 6U and admelog 2U  - cw ISS    HEMATOLOGY/ONCOLOGY  FELIX      DVT PPX  SQH     ETHICS  Full code MICU Accept Note    CHIEF COMPLAINT: AMS     HPI / INTERVAL HISTORY:  73-year-old male past medical history hypertension, ESRD on dialysis Monday Wednesday Friday, diabetes insulin-dependent presents with hiccups patient endorses persistent hiccups for the last 2 days. found to have peaked T waves, a new finding. denies dizziness, N/V, denies CP, palps, abd pain    RRT called on 5/1 PM for AMS. Per initial RRT note, pt had been alert but not interactive throughout admission with any specialists today. In addition, per RN Documentation 4/30 evening - pt neurological status varying between A&OX1 and A&Ox 0. PT was admitted for chest pain and Hiccups. Writer presented to bedside during RRT - On exam, PERRL, initially not following commands, but moving all extremities, no facial droop noted. FSG >100. Pt with thick secretions, suctioned. By end of rapid, pt following commands, moving all extremities. PT with elevated BP at rapid likely 2/2 pt maurilio arm, repeat BP 180s/100s. Per eMar, pt was given Haldol 1 mg IVP ~ 2200 4/30 and Baclofen ~ 6 am 5/1.     Second RRT called on 5/1 PM for AMS. During second RRT, pt AOx0, responsive to sternal rub, protecting airway while on RA. At conclusion of initial rapid pt was following commands, moving all extremities. Neurology consulted and recommended vEEG for 2 hours along with MRI w/w/o IV contrast.     Pt seen and examined at bedside after RRT x2. Pt AOx0. Limited ROS. Pt with significant secretions. B/l UE twitching noted. Limited response to pain. Will admit to MICU for airway monitoring.     PAST MEDICAL & SURGICAL HISTORY:  Diabetes      Benign essential HTN      HLD (hyperlipidemia)      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      ESRD on hemodialysis      Arteriovenous fistula          FAMILY HISTORY:  FHx: diabetes mellitus (Father, Aunt)        SOCIAL HISTORY:  Limited     HOME MEDICATIONS:      Allergies    No Known Allergies    Intolerances          REVIEW OF SYSTEMS:  Constitutional: No fevers, chills, weight loss, weight gain  HEENT: No vision problems, eye pain, nasal congestion, rhinorrhea, sore throat, dysphagia  CV: No chest pain, orthopnea, palpitations  Resp: No cough, dyspnea, wheezing, hemoptysis  GI: No nausea, vomiting, diarrhea, constipation, abdominal pain  : [ ] dysuria [ ] nocturia [ ] hematuria [ ] increased urinary frequency  Musculoskeletal: [ ] back pain [ ] myalgias [ ] arthralgias [ ] fracture  Skin: [ ] rash [ ] itch  Neurological: [ ] headache [ ] dizziness [ ] syncope [ ] weakness [ ] numbness  Psychiatric: [ ] anxiety [ ] depression  Endocrine: [ ] diabetes [ ] thyroid problem  Hematologic/Lymphatic: [ ] anemia [ ] bleeding problem  Allergic/Immunologic: [ ] itchy eyes [ ] nasal discharge [ ] hives [ ] angioedema  [ ] All other systems negative  [X] Unable to assess ROS because AMS    OBJECTIVE:  ICU Vital Signs Last 24 Hrs  T(C): 37.2 (02 May 2024 02:10), Max: 37.6 (01 May 2024 22:15)  T(F): 99 (02 May 2024 02:10), Max: 99.6 (01 May 2024 22:15)  HR: 91 (02 May 2024 02:10) (58 - 92)  BP: 158/81 (02 May 2024 02:10) (129/98 - 199/69)  BP(mean): --  ABP: --  ABP(mean): --  RR: 15 (02 May 2024 02:10) (15 - 20)  SpO2: 96% (01 May 2024 23:30) (94% - 98%)    O2 Parameters below as of 02 May 2024 02:10  Patient On (Oxygen Delivery Method): room air              05-01 @ 07:01  -  05-02 @ 03:37  --------------------------------------------------------  IN: 900 mL / OUT: 2241 mL / NET: -1341 mL      CAPILLARY BLOOD GLUCOSE      POCT Blood Glucose.: 112 mg/dL (02 May 2024 03:10)      PHYSICAL EXAM:  LOS: 3d    VITALS:   T(C): 37.2 (05-02-24 @ 02:10), Max: 37.6 (05-01-24 @ 22:15)  HR: 91 (05-02-24 @ 02:10) (58 - 92)  BP: 158/81 (05-02-24 @ 02:10) (129/98 - 199/69)  RR: 15 (05-02-24 @ 02:10) (15 - 20)  SpO2: 96% (05-01-24 @ 23:30) (94% - 98%)    GENERAL: ill-appearing   HEAD:  Atraumatic, Normocephalic  EYES: conjunctiva and sclera clear  ENT: Moist mucous membranes  NECK: Supple, No JVD  CHEST/LUNG: + abnormal breathing w/ upper airways secretions   HEART: Regular rate and rhythm; No murmurs, rubs, or gallops  ABDOMEN: BSx4; Soft, nontender, nondistended  EXTREMITIES:   No clubbing, cyanosis, or edema  NERVOUS SYSTEM:  A&Ox0, no focal deficits   SKIN: No rashes or lesions    LINES:     HOSPITAL MEDICATIONS:  MEDICATIONS  (STANDING):  atorvastatin 20 milliGRAM(s) Oral at bedtime  calcium acetate 667 milliGRAM(s) Oral three times a day with meals  carvedilol 12.5 milliGRAM(s) Oral every 12 hours  chlorhexidine 2% Cloths 1 Application(s) Topical <User Schedule>  dextrose 10% Bolus 125 milliLiter(s) IV Bolus once  dextrose 5%. 1000 milliLiter(s) (50 mL/Hr) IV Continuous <Continuous>  dextrose 5%. 1000 milliLiter(s) (100 mL/Hr) IV Continuous <Continuous>  dextrose 50% Injectable 12.5 Gram(s) IV Push once  dextrose 50% Injectable 25 Gram(s) IV Push once  dextrose 50% Injectable 50 milliLiter(s) IV Push once  epoetin reilly (EPOGEN) Injectable 03058 Unit(s) IV Push <User Schedule>  glucagon  Injectable 1 milliGRAM(s) IntraMuscular once  heparin   Injectable 5000 Unit(s) SubCutaneous every 12 hours  hydrALAZINE 100 milliGRAM(s) Oral three times a day  insulin glargine Injectable (LANTUS) 6 Unit(s) SubCutaneous at bedtime  insulin lispro (ADMELOG) corrective regimen sliding scale   SubCutaneous three times a day before meals  insulin lispro Injectable (ADMELOG) 2 Unit(s) SubCutaneous three times a day before meals  insulin regular  human recombinant 5 Unit(s) IV Push once  metoclopramide 10 milliGRAM(s) Oral three times a day before meals  NIFEdipine XL 60 milliGRAM(s) Oral two times a day    MEDICATIONS  (PRN):  dextrose Oral Gel 15 Gram(s) Oral once PRN Blood Glucose LESS THAN 70 milliGRAM(s)/deciliter      LABS:                        13.2   13.32 )-----------( 134      ( 01 May 2024 22:42 )             40.9     Hgb Trend: 13.2<--, 13.1<--, 11.8<--, 12.3<--  05-01    136  |  92<L>  |  47<H>  ----------------------------<  117<H>  5.2   |  22  |  7.58<H>    Ca    9.0      01 May 2024 22:42  Phos  6.0     05-01  Mg     2.70     05-01    TPro  8.0  /  Alb  4.4  /  TBili  0.6  /  DBili  x   /  AST  10  /  ALT  8   /  AlkPhos  94  05-01    Creatinine Trend: 7.58<--, 7.16<--, 9.02<--, 7.74<--, 6.28<--    Urinalysis Basic - ( 01 May 2024 22:42 )    Color: x / Appearance: x / SG: x / pH: x  Gluc: 117 mg/dL / Ketone: x  / Bili: x / Urobili: x   Blood: x / Protein: x / Nitrite: x   Leuk Esterase: x / RBC: x / WBC x   Sq Epi: x / Non Sq Epi: x / Bacteria: x        Venous Blood Gas:  05-01 @ 22:42  7.36/51/27/29/31.1  VBG Lactate: 1.7      MICROBIOLOGY:     RADIOLOGY & ADDITIONAL TESTS:      ASSESSMENT AND PLAN:  73M w/ PMH of HTN, ESRD (MWF), IDDM, p/f chest pain c/b hiccups for the last 2 days undergoing ischemic evaluation per cardiology. Pt s/p RRT x 2 for AMS. MICU consulted and accepting for airway monitoring in setting of ? baclofen toxicity.     NEUROLOGY   #AMS  - patient with multiple RRTs for AMS  - CTH without acute findings   - may be due to baclofen toxicity     PLAN  - follow up tox and neuro recs  - obtain MRI brain and vEEG  - cw HD for concern of baclofen toxicity       CARDIOVASCULAR     #Angina  - patient admitted with chest pain and noted to have peaked T waves on admission   - TTE showing EF 72%  - pending NST with Cards once improvement in clinical status     #HTN  - cw coreg, hydralazine, and nifedipine     RESPIRATORY   FELIX    GI/NUTRITION   #Gastroporesis   cw reglan     /RENAL   #ESRD MWF  - cw HD with R fem shiley  - pending fistula study of RUE (failed previous HD session)      INFECTIOUS DISEASE   #leukocytosis  - noted on repeat labs  - follow up infectious workup  - would monitor off antibiotics for now     ENDOCRINE   #IDD  -cw lantus 6U and admelog 2U  - cw ISS    HEMATOLOGY/ONCOLOGY  FELIX      DVT PPX  SQH     ETHICS  Full code MICU Accept Note    CHIEF COMPLAINT: AMS     HPI / INTERVAL HISTORY:  73-year-old male past medical history hypertension, ESRD on dialysis Monday Wednesday Friday, diabetes insulin-dependent presents with hiccups patient endorses persistent hiccups for the last 2 days. found to have peaked T waves, a new finding. denies dizziness, N/V, denies CP, palps, abd pain    RRT called on 5/1 PM for AMS. Per initial RRT note, pt had been alert but not interactive throughout admission with any specialists today. In addition, per RN Documentation 4/30 evening - pt neurological status varying between A&OX1 and A&Ox 0. PT was admitted for chest pain and Hiccups. Writer presented to bedside during RRT - On exam, PERRL, initially not following commands, but moving all extremities, no facial droop noted. FSG >100. Pt with thick secretions, suctioned. By end of rapid, pt following commands, moving all extremities. PT with elevated BP at rapid likely 2/2 pt maurilio arm, repeat BP 180s/100s. Per eMar, pt was given Haldol 1 mg IVP ~ 2200 4/30 and Baclofen ~ 6 am 5/1.     Second RRT called on 5/1 PM for AMS. During second RRT, pt AOx0, responsive to sternal rub, protecting airway while on RA. At conclusion of initial rapid pt was following commands, moving all extremities. Neurology consulted and recommended vEEG for 2 hours along with MRI w/w/o IV contrast.     Pt seen and examined at bedside after RRT x2. Pt AOx0. Limited ROS. Pt with significant secretions. B/l UE twitching noted. Limited response to pain. Will admit to MICU for airway monitoring.     PAST MEDICAL & SURGICAL HISTORY:  Diabetes      Benign essential HTN      HLD (hyperlipidemia)      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      ESRD on hemodialysis      Arteriovenous fistula          FAMILY HISTORY:  FHx: diabetes mellitus (Father, Aunt)        SOCIAL HISTORY:  Limited     HOME MEDICATIONS:      Allergies    No Known Allergies    Intolerances          REVIEW OF SYSTEMS:  Constitutional: No fevers, chills, weight loss, weight gain  HEENT: No vision problems, eye pain, nasal congestion, rhinorrhea, sore throat, dysphagia  CV: No chest pain, orthopnea, palpitations  Resp: No cough, dyspnea, wheezing, hemoptysis  GI: No nausea, vomiting, diarrhea, constipation, abdominal pain  : [ ] dysuria [ ] nocturia [ ] hematuria [ ] increased urinary frequency  Musculoskeletal: [ ] back pain [ ] myalgias [ ] arthralgias [ ] fracture  Skin: [ ] rash [ ] itch  Neurological: [ ] headache [ ] dizziness [ ] syncope [ ] weakness [ ] numbness  Psychiatric: [ ] anxiety [ ] depression  Endocrine: [ ] diabetes [ ] thyroid problem  Hematologic/Lymphatic: [ ] anemia [ ] bleeding problem  Allergic/Immunologic: [ ] itchy eyes [ ] nasal discharge [ ] hives [ ] angioedema  [ ] All other systems negative  [X] Unable to assess ROS because AMS    OBJECTIVE:  ICU Vital Signs Last 24 Hrs  T(C): 37.2 (02 May 2024 02:10), Max: 37.6 (01 May 2024 22:15)  T(F): 99 (02 May 2024 02:10), Max: 99.6 (01 May 2024 22:15)  HR: 91 (02 May 2024 02:10) (58 - 92)  BP: 158/81 (02 May 2024 02:10) (129/98 - 199/69)  BP(mean): --  ABP: --  ABP(mean): --  RR: 15 (02 May 2024 02:10) (15 - 20)  SpO2: 96% (01 May 2024 23:30) (94% - 98%)    O2 Parameters below as of 02 May 2024 02:10  Patient On (Oxygen Delivery Method): room air              05-01 @ 07:01  -  05-02 @ 03:37  --------------------------------------------------------  IN: 900 mL / OUT: 2241 mL / NET: -1341 mL      CAPILLARY BLOOD GLUCOSE      POCT Blood Glucose.: 112 mg/dL (02 May 2024 03:10)      PHYSICAL EXAM:  LOS: 3d    VITALS:   T(C): 37.2 (05-02-24 @ 02:10), Max: 37.6 (05-01-24 @ 22:15)  HR: 91 (05-02-24 @ 02:10) (58 - 92)  BP: 158/81 (05-02-24 @ 02:10) (129/98 - 199/69)  RR: 15 (05-02-24 @ 02:10) (15 - 20)  SpO2: 96% (05-01-24 @ 23:30) (94% - 98%)    GENERAL: ill-appearing   HEAD:  Atraumatic, Normocephalic  EYES: conjunctiva and sclera clear  ENT: Moist mucous membranes  NECK: Supple, No JVD  CHEST/LUNG: + abnormal breathing w/ upper airways secretions   HEART: Regular rate and rhythm; No murmurs, rubs, or gallops  ABDOMEN: BSx4; Soft, nontender, nondistended  EXTREMITIES:   No clubbing, cyanosis, or edema  NERVOUS SYSTEM:  A&Ox0, no focal deficits   SKIN: No rashes or lesions    LINES:     HOSPITAL MEDICATIONS:  MEDICATIONS  (STANDING):  atorvastatin 20 milliGRAM(s) Oral at bedtime  calcium acetate 667 milliGRAM(s) Oral three times a day with meals  carvedilol 12.5 milliGRAM(s) Oral every 12 hours  chlorhexidine 2% Cloths 1 Application(s) Topical <User Schedule>  dextrose 10% Bolus 125 milliLiter(s) IV Bolus once  dextrose 5%. 1000 milliLiter(s) (50 mL/Hr) IV Continuous <Continuous>  dextrose 5%. 1000 milliLiter(s) (100 mL/Hr) IV Continuous <Continuous>  dextrose 50% Injectable 12.5 Gram(s) IV Push once  dextrose 50% Injectable 25 Gram(s) IV Push once  dextrose 50% Injectable 50 milliLiter(s) IV Push once  epoetin reilly (EPOGEN) Injectable 10034 Unit(s) IV Push <User Schedule>  glucagon  Injectable 1 milliGRAM(s) IntraMuscular once  heparin   Injectable 5000 Unit(s) SubCutaneous every 12 hours  hydrALAZINE 100 milliGRAM(s) Oral three times a day  insulin glargine Injectable (LANTUS) 6 Unit(s) SubCutaneous at bedtime  insulin lispro (ADMELOG) corrective regimen sliding scale   SubCutaneous three times a day before meals  insulin lispro Injectable (ADMELOG) 2 Unit(s) SubCutaneous three times a day before meals  insulin regular  human recombinant 5 Unit(s) IV Push once  metoclopramide 10 milliGRAM(s) Oral three times a day before meals  NIFEdipine XL 60 milliGRAM(s) Oral two times a day    MEDICATIONS  (PRN):  dextrose Oral Gel 15 Gram(s) Oral once PRN Blood Glucose LESS THAN 70 milliGRAM(s)/deciliter      LABS:                        13.2   13.32 )-----------( 134      ( 01 May 2024 22:42 )             40.9     Hgb Trend: 13.2<--, 13.1<--, 11.8<--, 12.3<--  05-01    136  |  92<L>  |  47<H>  ----------------------------<  117<H>  5.2   |  22  |  7.58<H>    Ca    9.0      01 May 2024 22:42  Phos  6.0     05-01  Mg     2.70     05-01    TPro  8.0  /  Alb  4.4  /  TBili  0.6  /  DBili  x   /  AST  10  /  ALT  8   /  AlkPhos  94  05-01    Creatinine Trend: 7.58<--, 7.16<--, 9.02<--, 7.74<--, 6.28<--    Urinalysis Basic - ( 01 May 2024 22:42 )    Color: x / Appearance: x / SG: x / pH: x  Gluc: 117 mg/dL / Ketone: x  / Bili: x / Urobili: x   Blood: x / Protein: x / Nitrite: x   Leuk Esterase: x / RBC: x / WBC x   Sq Epi: x / Non Sq Epi: x / Bacteria: x        Venous Blood Gas:  05-01 @ 22:42  7.36/51/27/29/31.1  VBG Lactate: 1.7      MICROBIOLOGY:     RADIOLOGY & ADDITIONAL TESTS:      ASSESSMENT AND PLAN:  73M w/ PMH of HTN, ESRD (MWF), IDDM, p/f chest pain c/b hiccups for the last 2 days undergoing ischemic evaluation per cardiology. Pt s/p RRT x 2 for AMS. MICU consulted and accepting for airway monitoring in setting of ? baclofen toxicity.     NEUROLOGY   #AMS  - patient with multiple RRTs for AMS  - CTH without acute findings   - may be due to baclofen toxicity     PLAN  - follow up tox and neuro recs  - obtain MRI brain and vEEG  - cw HD for concern of baclofen toxicity       CARDIOVASCULAR     #Angina  - patient admitted with chest pain and noted to have peaked T waves on admission   - TTE showing EF 72%  - pending NST with Cards once improvement in clinical status     #HTN  - cw coreg, hydralazine, and nifedipine     RESPIRATORY   FELIX    GI/NUTRITION   #Gastroporesis   cw reglan     /RENAL   #ESRD MWF  - cw HD with R fem shiley  - pending fistula study of RUE (failed previous HD session)      INFECTIOUS DISEASE   #leukocytosis  - noted on repeat labs  - follow up infectious workup  - would monitor off antibiotics for now     ENDOCRINE   #IDD  -cw lantus 6U and admelog 2U  - cw ISS    HEMATOLOGY/ONCOLOGY  FELIX      DVT PPX  SQH     ETHICS  Full code    Attending Attestation  Pt seen and examined with resident and I agree with above as documented by the resident except as noted below   74 yo with ESRD and history as above presented with hiccups and was treated with baclofen 10mg tid x 3d with last dose 5/1 morning.  Now with AMS - lethargy progressing to unresponsiveness and waxing and waning through the day.  W/U for AMS negative including head CT, bloodwork, and had 3h HD 5/1 without improvement in MS  He is intermittently unresponsive to sternal rub and otherwise arouses to voice and briefly attends without following commands.  Pupils are equal and sluggish.  He coughs, has +gag, but has required suctioning for audible oropharyngeal secretions.  U Ext shaking and jerking felt by neurology to be c/w myoclonic aot seizure activity.  Respirations are irregular, almost cheynne nascimento, but no desat on RA.    # AMS likely 2/2 baclofen toxicity.  Neuro input appreciated.  D/W toxicology. Supportive care and HD.  # Respiratory compromise; increased secretions, vbg adequate and on RA.  Will monitor closely as pt may require intubation for airway protection  # DVT ppx sq heparin  # GOC full code  I have personally and independently provided 50 min critical care services.  This excludes any time spent on separate procedures or teaching  I attest that I solely documented the information in the attending attestation  Evangelina Clifford MD.

## 2024-05-02 NOTE — CONSULT NOTE ADULT - SUBJECTIVE AND OBJECTIVE BOX
[facility], Division of Infectious Diseases  AUGUST Carbajal S. Shah, Y. Patel, G. Brian  [phone]  ([phone] - weekdays after 5pm and weekends)    JIMMY BLAND  73y, Male  [id number]    HPI--  HPI:  74 y/o M PMhx HTN, ESRD (on HD M/W/F), DM who presented with hiccups x 2 days and chest pain found to have peaked T waves. He was evaluated by cardiology and was planned for nuclear stress test. He was started on reglan for possible gastroparesis. Hospital course c/b AMS s/p RRT on 5/1 and 5/2. Vascular consulted 2/2 RUE AVF access unable to be used s/p R femoral shiley placed for emergent HD. Transferred to MICU and intubated 5/2 for airway protection. Neurology was consulted.   Patient unable to provide history therefore history obtained from chart review.     ROS: unable to assess 2/2 patient's mentation, pt intubated    Allergies: No Known Allergies    PMH -- Diabetes    Stage 5 chronic kidney disease not on chronic dialysis    Benign essential HTN    HLD (hyperlipidemia)    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    ESRD on hemodialysis      PSH -- No significant past surgical history    AVF (arteriovenous fistula)    Arteriovenous fistula      FH -- FHx: diabetes mellitus (Father, Aunt)      Social History -- unable to assess 2/2 patient's mentation, pt intubated    Physical Exam--  Vital Signs Last 24 Hrs  T(F): 100 (02 May 2024 12:00), Max: 100.2 (02 May 2024 05:00)  HR: 60 (02 May 2024 14:00) (47 - 108)  BP: 140/58 (02 May 2024 14:00) (83/60 - 199/69)  RR: 18 (02 May 2024 14:00) (12 - 24)  SpO2: 100% (02 May 2024 14:00) (92% - 100%)  General: no acute distress  HEENT: anicteric, ETT  Lungs: clear to auscultation bilaterally  Heart: S1, S2, normal rate.   Abdomen: Soft. Nondistended.  Neuro: sedated  Extremities: No LE edema.   Skin: Warm. Dry. No rash.  Psychiatric: unable to assess   Lines: R femoral shiley    Laboratory & Imaging Data--  CBC:                       13.3   24.85 )-----------( 101      ( 02 May 2024 03:37 )             41.2     WBC Count: 24.85 K/uL (05-02-24 @ 03:37)  WBC Count: 13.32 K/uL (05-01-24 @ 22:42)  WBC Count: 9.64 K/uL (05-01-24 @ 05:48)  WBC Count: 7.12 K/uL (04-30-24 @ 06:03)  WBC Count: 12.39 K/uL (04-29-24 @ 16:19)    CMP: 05-02    138  |  95<L>  |  28<H>  ----------------------------<  114<H>  4.4   |  19<L>  |  4.98<H>    Ca    8.9      02 May 2024 03:37  Phos  3.7     05-02  Mg     2.30     05-02    TPro  8.0  /  Alb  4.4  /  TBili  0.6  /  DBili  x   /  AST  10  /  ALT  8   /  AlkPhos  94  05-01    LIVER FUNCTIONS - ( 01 May 2024 22:42 )  Alb: 4.4 g/dL / Pro: 8.0 g/dL / ALK PHOS: 94 U/L / ALT: 8 U/L / AST: 10 U/L / GGT: x           Urinalysis Basic - ( 02 May 2024 03:37 )    Color: x / Appearance: x / SG: x / pH: x  Gluc: 114 mg/dL / Ketone: x  / Bili: x / Urobili: x   Blood: x / Protein: x / Nitrite: x   Leuk Esterase: x / RBC: x / WBC x   Sq Epi: x / Non Sq Epi: x / Bacteria: x      Microbiology:     Culture - CSF with Gram Stain (collected 05-02-24 @ 12:15)  Source: .CSF CSF lumbar  Gram Stain (05-02-24 @ 13:51):    polymorphonuclear leukocytes    No organisms seen    by cytocentrifuge        Radiology--  ***  Active Medications--  chlorhexidine 0.12% Liquid 15 milliLiter(s) Oral Mucosa every 12 hours  chlorhexidine 2% Cloths 1 Application(s) Topical <User Schedule>  dextrose 10% Bolus 125 milliLiter(s) IV Bolus once  dextrose 5%. 1000 milliLiter(s) IV Continuous <Continuous>  dextrose 5%. 1000 milliLiter(s) IV Continuous <Continuous>  dextrose 50% Injectable 12.5 Gram(s) IV Push once  dextrose 50% Injectable 25 Gram(s) IV Push once  dextrose 50% Injectable 50 milliLiter(s) IV Push once  dextrose Oral Gel 15 Gram(s) Oral once PRN  epoetin reilly (EPOGEN) Injectable 40516 Unit(s) IV Push <User Schedule>  glucagon  Injectable 1 milliGRAM(s) IntraMuscular once  heparin   Injectable 5000 Unit(s) SubCutaneous every 12 hours  insulin lispro (ADMELOG) corrective regimen sliding scale   SubCutaneous every 6 hours  insulin regular  human recombinant 5 Unit(s) IV Push once  norepinephrine Infusion 0.1 MICROgram(s)/kG/Min IV Continuous <Continuous>  propofol Infusion 20 MICROgram(s)/kG/Min IV Continuous <Continuous>    Antimicrobials:     Immunologic: epoetin reilly (EPOGEN) Injectable 35535 Unit(s) IV Push <User Schedule>

## 2024-05-02 NOTE — RAPID RESPONSE TEAM SUMMARY - NSADDTLFINDINGSRRT_GEN_ALL_CORE
Patient had previous RRT for AMS. Upon my arrival patient was unresponsive to commands and had mild twitching. Patient withdrawing all 4 extremities to pain, pupils sluggish but reactive. CTH taken earlier today without significant finding. No recent medications given to explain AMS. Labs reviewed, nothing to explains AMS as he is mildly uremic with no hypercarbia. Patient was hyperkalemic to 5.5 for which lokelma was not given due to AMS, so patient was given 5u insulin and 1/2 amp D50. HTN persistently to 200s/100s, 10mg hydralazine given. Patient has gag reflex and is moving extremities to pull at suction when it is being used, so he is both protecting his airway and this rules out a seizure. Copious secretions were suctioned out via nasotracheal suction. He was never hypoxic and HR in the 70s-80s. Given concern for airway protection as he is obtunded, MICU consulted and to see patient. C/f baclofen toxicity as he has been on 10mg TID and baclofen should be avoided in HD patients, if used it should be dosed at 2.5 TID. Otherwise no clear explanation for AMS.

## 2024-05-02 NOTE — CONSULT NOTE ADULT - CRITICAL CARE ATTENDING COMMENT
73M PMHx HTN, ESRD, IDDM p/w hiccups and started on baclofen with concern for AMS overnight and desaturation, ?cheye nascimento respirations. Labs with numerous metabolic derangements. CTH with bifrontal encephalomalacia, likley traumatic.   WBC inc significantly  Intubated this AM. Exam limited as on propofol, also on pressors.   s/p LP for meningitis concerns   Would continue to monitor for possible baclofen toxicity/withdrawl  f/u CSF  consider MRI brain ww/o when stable - there may be false dural enhancement post LP  EEG when able

## 2024-05-02 NOTE — PROGRESS NOTE ADULT - SUBJECTIVE AND OBJECTIVE BOX
[facility]   MD ELIANE BHAGAT MD RUORU WONG, PA    TEL:  FROM 9 AM to 5 PM ---OFFICE: [phone]  AVAILABLE ON TEAMS    FROM 5 PM - 9 AM PLEASE CALL ANSWERING SERVICE: [phone]    RENAL FOLLOW UP NOTE--Date of Service 05-02-24 @ 10:53  --------------------------------------------------------------------------------  HPI:      Pt seen and examined at bedside.       PAST HISTORY  --------------------------------------------------------------------------------  No significant changes to PMH, PSH, FHx, SHx, unless otherwise noted    ALLERGIES & MEDICATIONS  --------------------------------------------------------------------------------  Allergies    No Known Allergies    Intolerances      Standing Inpatient Medications  atorvastatin 20 milliGRAM(s) Oral at bedtime  calcium acetate 667 milliGRAM(s) Oral three times a day with meals  carvedilol 12.5 milliGRAM(s) Oral every 12 hours  chlorhexidine 0.12% Liquid 15 milliLiter(s) Oral Mucosa every 12 hours  chlorhexidine 2% Cloths 1 Application(s) Topical <User Schedule>  dextrose 10% Bolus 125 milliLiter(s) IV Bolus once  dextrose 5%. 1000 milliLiter(s) IV Continuous <Continuous>  dextrose 5%. 1000 milliLiter(s) IV Continuous <Continuous>  dextrose 50% Injectable 12.5 Gram(s) IV Push once  dextrose 50% Injectable 25 Gram(s) IV Push once  dextrose 50% Injectable 50 milliLiter(s) IV Push once  epoetin reilly (EPOGEN) Injectable 58392 Unit(s) IV Push <User Schedule>  glucagon  Injectable 1 milliGRAM(s) IntraMuscular once  heparin   Injectable 5000 Unit(s) SubCutaneous every 12 hours  hydrALAZINE 100 milliGRAM(s) Oral three times a day  insulin lispro (ADMELOG) corrective regimen sliding scale   SubCutaneous every 6 hours  insulin regular  human recombinant 5 Unit(s) IV Push once  metoclopramide 10 milliGRAM(s) Oral three times a day before meals  NIFEdipine XL 60 milliGRAM(s) Oral two times a day  norepinephrine Infusion 0.1 MICROgram(s)/kG/Min IV Continuous <Continuous>  propofol Infusion 20 MICROgram(s)/kG/Min IV Continuous <Continuous>    PRN Inpatient Medications  dextrose Oral Gel 15 Gram(s) Oral once PRN      REVIEW OF SYSTEMS  --------------------------------------------------------------------------------  unable to obtain   VITALS/PHYSICAL EXAM  --------------------------------------------------------------------------------  T(C): 37.1 (05-02-24 @ 08:00), Max: 37.9 (05-02-24 @ 05:00)  HR: 60 (05-02-24 @ 10:00) (60 - 108)  BP: 109/55 (05-02-24 @ 10:00) (93/71 - 199/69)  RR: 18 (05-02-24 @ 10:00) (12 - 23)  SpO2: 100% (05-02-24 @ 10:00) (92% - 100%)  Wt(kg): --        05-01-24 @ 07:01  -  05-02-24 @ 07:00  --------------------------------------------------------  IN: 900 mL / OUT: 2241 mL / NET: -1341 mL      Physical Exam:  	Gen: NAD  	HEENT:  yolie  	Pulm: CTA B/L  	CV: S1S2  	Abd: Soft, +BS  	Ext: No LE edema B/L                      Neuro: non verbal  	Skin: Warm and Dry   	Vascular access:  HD catheter         LABS/STUDIES  --------------------------------------------------------------------------------              13.3   24.85 >-----------<  101      [05-02-24 @ 03:37]              41.2     138  |  95  |  28  ----------------------------<  114      [05-02-24 @ 03:37]  4.4   |  19  |  4.98        Ca     8.9     [05-02-24 @ 03:37]      Mg     2.30     [05-02-24 @ 03:37]      Phos  3.7     [05-02-24 @ 03:37]    TPro  8.0  /  Alb  4.4  /  TBili  0.6  /  DBili  x   /  AST  10  /  ALT  8   /  AlkPhos  94  [05-01-24 @ 22:42]          Creatinine Trend:  SCr 4.98 [05-02 @ 03:37]  SCr 7.58 [05-01 @ 22:42]  SCr 7.16 [05-01 @ 15:17]  SCr 9.02 [05-01 @ 05:48]  SCr 7.74 [04-30 @ 06:03]    Urinalysis - [05-02-24 @ 03:37]      Color  / Appearance  / SG  / pH       Gluc 114 / Ketone   / Bili  / Urobili        Blood  / Protein  / Leuk Est  / Nitrite       RBC  / WBC  / Hyaline  / Gran  / Sq Epi  / Non Sq Epi  / Bacteria       Iron 47, TIBC --, %sat --      [05-01-24 @ 06:44]  TSH 2.79      [04-30-24 @ 06:03]

## 2024-05-02 NOTE — CONSULT NOTE ADULT - ASSESSMENT
73M w/ PMHx hypertension, ESRD on HD via R radiocephalic fistula (MWF), DM, HTN, p/w hiccups and peaked T waves. Patient received 1x HD on admission. Pt failed HD today and had 2 RRT for AMS. Vascular consulted for shiley placement for emergent HD.    PLAN:  - R femoral shiley placement and then HD  - F/u labs and CT Head via RRT  - will follow to evaluate RUE AVF    Discussed w/ Dr. Lockett    Vascular (C-Team) Surgery  [id number]

## 2024-05-02 NOTE — CHART NOTE - NSCHARTNOTEFT_GEN_A_CORE
73M s/p RUE radiocephalic AVF creation in 9/22 s/p BAM in 11/22 p/w AMS, difficulty with AVF cannulation for HD now admitted to MICU for airway monitoring and further w/u. S/p R femoral shiley placement by vascular overnight for emergent HD    - R femoral shiley to stay in for maximum 72 hours. Please consult IR for conversion to IJ temporary dialysis catheter  - Obtain RUE AVF duplex      - Will likely require fistulogram this admission   - Discussed w/ MICU       Vascular Surgery   [id number]

## 2024-05-02 NOTE — CHART NOTE - NSCHARTNOTEFT_GEN_A_CORE
Overnight Medicine ACP Coverage    Patient to be accepted to MICU closer monitoring. Wife Pauline (states she is HCP) updated of plan of care. Confirms patient to be full CODE.    Bowen Boudreaux PA-C  Department of Medicine  [facility]  [id number]

## 2024-05-02 NOTE — PROGRESS NOTE ADULT - ASSESSMENT
73-year-old male past medical history hypertension, ESRD on dialysis Monday Wednesday Friday, diabetes insulin-dependent presents with hiccups patient endorses persistent hiccups for the last 2 days. Nephrology consulted for HD needs.    A/P  ESRD:  Center: [place]  Nephrologist: Dr. Hardwick  Access: R AVF  MWF schedule  Vascular for fistulogram   s/p femoral shiley on 5/1 . Please get new catheter after 1-2 days  s/p HD on 5/1   Consent obtained and placed in ED chart  Renal diet  Monitor BMP    Hyperkalemia  s/p HD on 5/1  Low K Diet  Monitor closely     HTN:  Fluctuating  Care per ICU  Monitor BP    Anemia:  Above goal  Monitor hb    CKD-MBD  Get PTH  Monitor Ca, PO4 daily

## 2024-05-02 NOTE — PROGRESS NOTE ADULT - SUBJECTIVE AND OBJECTIVE BOX
Patient is a 73y Male     Patient is a 73y old  Male who presents with a chief complaint of Unstable angina, abn EKG (02 May 2024 10:52)      HPI:  73-year-old male past medical history hypertension, ESRD on dialysis Monday Wednesday Friday, diabetes insulin-dependent presents with hiccups patient endorses persistent hiccups for the last 2 days. found to have peaked T waves, a new finding. denies dizziness, N/V, denies CP, palps, abd pain (29 Apr 2024 21:15)      PAST MEDICAL & SURGICAL HISTORY:  Diabetes      Benign essential HTN      HLD (hyperlipidemia)      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      ESRD on hemodialysis      Arteriovenous fistula          MEDICATIONS  (STANDING):  chlorhexidine 0.12% Liquid 15 milliLiter(s) Oral Mucosa every 12 hours  chlorhexidine 2% Cloths 1 Application(s) Topical <User Schedule>  dextrose 10% Bolus 125 milliLiter(s) IV Bolus once  dextrose 5%. 1000 milliLiter(s) (50 mL/Hr) IV Continuous <Continuous>  dextrose 5%. 1000 milliLiter(s) (100 mL/Hr) IV Continuous <Continuous>  dextrose 50% Injectable 12.5 Gram(s) IV Push once  dextrose 50% Injectable 25 Gram(s) IV Push once  dextrose 50% Injectable 50 milliLiter(s) IV Push once  epoetin reilly (EPOGEN) Injectable 49464 Unit(s) IV Push <User Schedule>  glucagon  Injectable 1 milliGRAM(s) IntraMuscular once  heparin   Injectable 5000 Unit(s) SubCutaneous every 12 hours  insulin lispro (ADMELOG) corrective regimen sliding scale   SubCutaneous every 6 hours  insulin regular  human recombinant 5 Unit(s) IV Push once  norepinephrine Infusion 0.1 MICROgram(s)/kG/Min (9.87 mL/Hr) IV Continuous <Continuous>  propofol Infusion 20 MICROgram(s)/kG/Min (6.31 mL/Hr) IV Continuous <Continuous>      Allergies    No Known Allergies    Intolerances        SOCIAL HISTORY:  Denies ETOh,Smoking,     FAMILY HISTORY:  FHx: diabetes mellitus (Father, Aunt)        REVIEW OF SYSTEMS:    CONSTITUTIONAL: No weakness, fevers or chills  EYES/ENT: No visual changes;  No vertigo or throat pain   NECK: No pain or stiffness  RESPIRATORY: No cough, wheezing, hemoptysis; No shortness of breath  CARDIOVASCULAR: No chest pain or palpitations  GASTROINTESTINAL: No abdominal or epigastric pain. No nausea, vomiting, or hematemesis; No diarrhea or constipation. No melena or hematochezia.  GENITOURINARY: No dysuria, frequency or hematuria  NEUROLOGICAL: No numbness or weakness  SKIN: No itching, burning, rashes, or lesions   All other review of systems is negative unless indicated above.    VITAL:  T(C): , Max: 37.9 (05-02-24 @ 05:00)  T(F): , Max: 100.2 (05-02-24 @ 05:00)  HR: 54 (05-02-24 @ 13:00)  BP: 122/67 (05-02-24 @ 13:00)  BP(mean): 79 (05-02-24 @ 13:00)  RR: 18 (05-02-24 @ 13:00)  SpO2: 100% (05-02-24 @ 13:00)  Wt(kg): --    I and O's:    05-01 @ 07:01  -  05-02 @ 07:00  --------------------------------------------------------  IN: 900 mL / OUT: 2241 mL / NET: -1341 mL    05-02 @ 07:01  -  05-02 @ 14:05  --------------------------------------------------------  IN: 98.2 mL / OUT: 0 mL / NET: 98.2 mL          PHYSICAL EXAM:    Constitutional: NAD  HEENT: PERRLA,   Neck: No JVD  Respiratory: CTA B/L  Cardiovascular: S1 and S2  Gastrointestinal: BS+, soft, NT/ND  Extremities: No peripheral edema  Neurological: A/O x 3, no focal deficits  Psychiatric: Normal mood, normal affect  : No Abbasi  Skin: No rashes  Access: Not applicable  Back: No CVA tenderness    LABS:                        13.3   24.85 )-----------( 101      ( 02 May 2024 03:37 )             41.2     05-02    138  |  95<L>  |  28<H>  ----------------------------<  114<H>  4.4   |  19<L>  |  4.98<H>    Ca    8.9      02 May 2024 03:37  Phos  3.7     05-02  Mg     2.30     05-02    TPro  8.0  /  Alb  4.4  /  TBili  0.6  /  DBili  x   /  AST  10  /  ALT  8   /  AlkPhos  94  05-01          RADIOLOGY & ADDITIONAL STUDIES:

## 2024-05-03 LAB
ALBUMIN SERPL ELPH-MCNC: 3.7 G/DL — SIGNIFICANT CHANGE UP (ref 3.3–5)
ALBUMIN SERPL ELPH-MCNC: 4 G/DL — SIGNIFICANT CHANGE UP (ref 3.3–5)
ALP SERPL-CCNC: 73 U/L — SIGNIFICANT CHANGE UP (ref 40–120)
ALP SERPL-CCNC: 82 U/L — SIGNIFICANT CHANGE UP (ref 40–120)
ALT FLD-CCNC: 10 U/L — SIGNIFICANT CHANGE UP (ref 4–41)
ALT FLD-CCNC: 18 U/L — SIGNIFICANT CHANGE UP (ref 4–41)
ANION GAP SERPL CALC-SCNC: 24 MMOL/L — HIGH (ref 7–14)
ANION GAP SERPL CALC-SCNC: 26 MMOL/L — HIGH (ref 7–14)
ANISOCYTOSIS BLD QL: SIGNIFICANT CHANGE UP
APTT BLD: 26.1 SEC — SIGNIFICANT CHANGE UP (ref 24.5–35.6)
AST SERPL-CCNC: 12 U/L — SIGNIFICANT CHANGE UP (ref 4–40)
AST SERPL-CCNC: 31 U/L — SIGNIFICANT CHANGE UP (ref 4–40)
BASOPHILS # BLD AUTO: 0 K/UL — SIGNIFICANT CHANGE UP (ref 0–0.2)
BASOPHILS # BLD AUTO: 0.04 K/UL — SIGNIFICANT CHANGE UP (ref 0–0.2)
BASOPHILS NFR BLD AUTO: 0 % — SIGNIFICANT CHANGE UP (ref 0–2)
BASOPHILS NFR BLD AUTO: 0.3 % — SIGNIFICANT CHANGE UP (ref 0–2)
BILIRUB SERPL-MCNC: 0.8 MG/DL — SIGNIFICANT CHANGE UP (ref 0.2–1.2)
BILIRUB SERPL-MCNC: 1 MG/DL — SIGNIFICANT CHANGE UP (ref 0.2–1.2)
BLOOD GAS ARTERIAL COMPREHENSIVE RESULT: SIGNIFICANT CHANGE UP
BLOOD GAS VENOUS COMPREHENSIVE RESULT: SIGNIFICANT CHANGE UP
BUN SERPL-MCNC: 51 MG/DL — HIGH (ref 7–23)
BUN SERPL-MCNC: 60 MG/DL — HIGH (ref 7–23)
CA-I BLD-SCNC: 0.95 MMOL/L — LOW (ref 1.15–1.29)
CALCIUM SERPL-MCNC: 8.3 MG/DL — LOW (ref 8.4–10.5)
CALCIUM SERPL-MCNC: 8.9 MG/DL — SIGNIFICANT CHANGE UP (ref 8.4–10.5)
CHLORIDE SERPL-SCNC: 93 MMOL/L — LOW (ref 98–107)
CHLORIDE SERPL-SCNC: 93 MMOL/L — LOW (ref 98–107)
CK MB BLD-MCNC: 1 % — SIGNIFICANT CHANGE UP (ref 0–2.5)
CK MB CFR SERPL CALC: 1.8 NG/ML — SIGNIFICANT CHANGE UP
CK SERPL-CCNC: 172 U/L — SIGNIFICANT CHANGE UP (ref 30–200)
CO2 SERPL-SCNC: 17 MMOL/L — LOW (ref 22–31)
CO2 SERPL-SCNC: 18 MMOL/L — LOW (ref 22–31)
CREAT SERPL-MCNC: 7.72 MG/DL — HIGH (ref 0.5–1.3)
CREAT SERPL-MCNC: 9.22 MG/DL — HIGH (ref 0.5–1.3)
DACRYOCYTES BLD QL SMEAR: SLIGHT — SIGNIFICANT CHANGE UP
EGFR: 6 ML/MIN/1.73M2 — LOW
EGFR: 7 ML/MIN/1.73M2 — LOW
ELLIPTOCYTES BLD QL SMEAR: SLIGHT — SIGNIFICANT CHANGE UP
EOSINOPHIL # BLD AUTO: 0 K/UL — SIGNIFICANT CHANGE UP (ref 0–0.5)
EOSINOPHIL # BLD AUTO: 0 K/UL — SIGNIFICANT CHANGE UP (ref 0–0.5)
EOSINOPHIL NFR BLD AUTO: 0 % — SIGNIFICANT CHANGE UP (ref 0–6)
EOSINOPHIL NFR BLD AUTO: 0 % — SIGNIFICANT CHANGE UP (ref 0–6)
GIANT PLATELETS BLD QL SMEAR: PRESENT — SIGNIFICANT CHANGE UP
GLUCOSE BLDC GLUCOMTR-MCNC: 156 MG/DL — HIGH (ref 70–99)
GLUCOSE BLDC GLUCOMTR-MCNC: 188 MG/DL — HIGH (ref 70–99)
GLUCOSE BLDC GLUCOMTR-MCNC: 239 MG/DL — HIGH (ref 70–99)
GLUCOSE BLDC GLUCOMTR-MCNC: 240 MG/DL — HIGH (ref 70–99)
GLUCOSE BLDC GLUCOMTR-MCNC: 250 MG/DL — HIGH (ref 70–99)
GLUCOSE BLDC GLUCOMTR-MCNC: 268 MG/DL — HIGH (ref 70–99)
GLUCOSE SERPL-MCNC: 153 MG/DL — HIGH (ref 70–99)
GLUCOSE SERPL-MCNC: 203 MG/DL — HIGH (ref 70–99)
HCT VFR BLD CALC: 38.1 % — LOW (ref 39–50)
HCT VFR BLD CALC: 38.6 % — LOW (ref 39–50)
HGB BLD-MCNC: 12.2 G/DL — LOW (ref 13–17)
HGB BLD-MCNC: 12.5 G/DL — LOW (ref 13–17)
HYPOCHROMIA BLD QL: SLIGHT — SIGNIFICANT CHANGE UP
IANC: 15.22 K/UL — HIGH (ref 1.8–7.4)
IANC: 9.81 K/UL — HIGH (ref 1.8–7.4)
IMM GRANULOCYTES NFR BLD AUTO: 0.3 % — SIGNIFICANT CHANGE UP (ref 0–0.9)
INR BLD: 1.16 RATIO — SIGNIFICANT CHANGE UP (ref 0.85–1.18)
LYMPHOCYTES # BLD AUTO: 0.16 K/UL — LOW (ref 1–3.3)
LYMPHOCYTES # BLD AUTO: 0.9 % — LOW (ref 13–44)
LYMPHOCYTES # BLD AUTO: 1.14 K/UL — SIGNIFICANT CHANGE UP (ref 1–3.3)
LYMPHOCYTES # BLD AUTO: 9.3 % — LOW (ref 13–44)
MACROCYTES BLD QL: SLIGHT — SIGNIFICANT CHANGE UP
MAGNESIUM SERPL-MCNC: 2.3 MG/DL — SIGNIFICANT CHANGE UP (ref 1.6–2.6)
MAGNESIUM SERPL-MCNC: 2.6 MG/DL — SIGNIFICANT CHANGE UP (ref 1.6–2.6)
MCHC RBC-ENTMCNC: 20.1 PG — LOW (ref 27–34)
MCHC RBC-ENTMCNC: 20.3 PG — LOW (ref 27–34)
MCHC RBC-ENTMCNC: 32 GM/DL — SIGNIFICANT CHANGE UP (ref 32–36)
MCHC RBC-ENTMCNC: 32.4 GM/DL — SIGNIFICANT CHANGE UP (ref 32–36)
MCV RBC AUTO: 62 FL — LOW (ref 80–100)
MCV RBC AUTO: 63.4 FL — LOW (ref 80–100)
METAMYELOCYTES # FLD: 0.9 % — SIGNIFICANT CHANGE UP (ref 0–1)
MICROCYTES BLD QL: SIGNIFICANT CHANGE UP
MONOCYTES # BLD AUTO: 0.75 K/UL — SIGNIFICANT CHANGE UP (ref 0–0.9)
MONOCYTES # BLD AUTO: 1.23 K/UL — HIGH (ref 0–0.9)
MONOCYTES NFR BLD AUTO: 10 % — SIGNIFICANT CHANGE UP (ref 2–14)
MONOCYTES NFR BLD AUTO: 4.3 % — SIGNIFICANT CHANGE UP (ref 2–14)
NEUTROPHILS # BLD AUTO: 15.86 K/UL — HIGH (ref 1.8–7.4)
NEUTROPHILS # BLD AUTO: 9.81 K/UL — HIGH (ref 1.8–7.4)
NEUTROPHILS NFR BLD AUTO: 80.1 % — HIGH (ref 43–77)
NEUTROPHILS NFR BLD AUTO: 81.7 % — HIGH (ref 43–77)
NEUTS BAND # BLD: 9.6 % — HIGH (ref 0–6)
NRBC # BLD: 0 /100 WBCS — SIGNIFICANT CHANGE UP (ref 0–0)
NRBC # BLD: 6 /100 WBCS — HIGH (ref 0–0)
NRBC # FLD: 0.11 K/UL — HIGH (ref 0–0)
OVALOCYTES BLD QL SMEAR: SIGNIFICANT CHANGE UP
PHOSPHATE SERPL-MCNC: 6.5 MG/DL — HIGH (ref 2.5–4.5)
PHOSPHATE SERPL-MCNC: 6.6 MG/DL — HIGH (ref 2.5–4.5)
PLAT MORPH BLD: NORMAL — SIGNIFICANT CHANGE UP
PLATELET # BLD AUTO: 113 K/UL — LOW (ref 150–400)
PLATELET # BLD AUTO: 117 K/UL — LOW (ref 150–400)
PLATELET COUNT - ESTIMATE: ABNORMAL
POIKILOCYTOSIS BLD QL AUTO: SLIGHT — SIGNIFICANT CHANGE UP
POLYCHROMASIA BLD QL SMEAR: SLIGHT — SIGNIFICANT CHANGE UP
POTASSIUM SERPL-MCNC: 5.1 MMOL/L — SIGNIFICANT CHANGE UP (ref 3.5–5.3)
POTASSIUM SERPL-MCNC: 5.8 MMOL/L — HIGH (ref 3.5–5.3)
POTASSIUM SERPL-SCNC: 5.1 MMOL/L — SIGNIFICANT CHANGE UP (ref 3.5–5.3)
POTASSIUM SERPL-SCNC: 5.8 MMOL/L — HIGH (ref 3.5–5.3)
PROT SERPL-MCNC: 7 G/DL — SIGNIFICANT CHANGE UP (ref 6–8.3)
PROT SERPL-MCNC: 7 G/DL — SIGNIFICANT CHANGE UP (ref 6–8.3)
PROTHROM AB SERPL-ACNC: 13.1 SEC — HIGH (ref 9.5–13)
RBC # BLD: 6.01 M/UL — HIGH (ref 4.2–5.8)
RBC # BLD: 6.23 M/UL — HIGH (ref 4.2–5.8)
RBC # FLD: 21.6 % — HIGH (ref 10.3–14.5)
RBC # FLD: 21.8 % — HIGH (ref 10.3–14.5)
RBC BLD AUTO: ABNORMAL
SODIUM SERPL-SCNC: 135 MMOL/L — SIGNIFICANT CHANGE UP (ref 135–145)
SODIUM SERPL-SCNC: 136 MMOL/L — SIGNIFICANT CHANGE UP (ref 135–145)
TARGETS BLD QL SMEAR: SLIGHT — SIGNIFICANT CHANGE UP
TROPONIN T, HIGH SENSITIVITY RESULT: 153 NG/L — CRITICAL HIGH
VARIANT LYMPHS # BLD: 2.6 % — SIGNIFICANT CHANGE UP (ref 0–6)
WBC # BLD: 12.26 K/UL — HIGH (ref 3.8–10.5)
WBC # BLD: 17.37 K/UL — HIGH (ref 3.8–10.5)
WBC # FLD AUTO: 12.26 K/UL — HIGH (ref 3.8–10.5)
WBC # FLD AUTO: 17.37 K/UL — HIGH (ref 3.8–10.5)

## 2024-05-03 PROCEDURE — 36800 INSERTION OF CANNULA: CPT | Mod: GC

## 2024-05-03 PROCEDURE — 71045 X-RAY EXAM CHEST 1 VIEW: CPT | Mod: 26

## 2024-05-03 PROCEDURE — 74018 RADEX ABDOMEN 1 VIEW: CPT | Mod: 26

## 2024-05-03 PROCEDURE — 99291 CRITICAL CARE FIRST HOUR: CPT | Mod: GC,25

## 2024-05-03 PROCEDURE — 71045 X-RAY EXAM CHEST 1 VIEW: CPT | Mod: 26,77

## 2024-05-03 RX ORDER — SODIUM CHLORIDE 9 MG/ML
500 INJECTION, SOLUTION INTRAVENOUS ONCE
Refills: 0 | Status: COMPLETED | OUTPATIENT
Start: 2024-05-03 | End: 2024-05-03

## 2024-05-03 RX ORDER — PIPERACILLIN AND TAZOBACTAM 4; .5 G/20ML; G/20ML
3.38 INJECTION, POWDER, LYOPHILIZED, FOR SOLUTION INTRAVENOUS ONCE
Refills: 0 | Status: COMPLETED | OUTPATIENT
Start: 2024-05-03 | End: 2024-05-03

## 2024-05-03 RX ORDER — PIPERACILLIN AND TAZOBACTAM 4; .5 G/20ML; G/20ML
3.38 INJECTION, POWDER, LYOPHILIZED, FOR SOLUTION INTRAVENOUS ONCE
Refills: 0 | Status: COMPLETED | OUTPATIENT
Start: 2024-05-04 | End: 2024-05-04

## 2024-05-03 RX ORDER — CALCIUM GLUCONATE 100 MG/ML
1 VIAL (ML) INTRAVENOUS ONCE
Refills: 0 | Status: COMPLETED | OUTPATIENT
Start: 2024-05-03 | End: 2024-05-03

## 2024-05-03 RX ORDER — HEPARIN SODIUM 5000 [USP'U]/ML
INJECTION INTRAVENOUS; SUBCUTANEOUS
Qty: 25000 | Refills: 0 | Status: DISCONTINUED | OUTPATIENT
Start: 2024-05-03 | End: 2024-05-05

## 2024-05-03 RX ORDER — INSULIN HUMAN 100 [IU]/ML
5 INJECTION, SOLUTION SUBCUTANEOUS ONCE
Refills: 0 | Status: COMPLETED | OUTPATIENT
Start: 2024-05-03 | End: 2024-05-03

## 2024-05-03 RX ORDER — VASOPRESSIN 20 [USP'U]/ML
0.03 INJECTION INTRAVENOUS
Qty: 40 | Refills: 0 | Status: DISCONTINUED | OUTPATIENT
Start: 2024-05-03 | End: 2024-05-05

## 2024-05-03 RX ORDER — HYDRALAZINE HCL 50 MG
25 TABLET ORAL THREE TIMES A DAY
Refills: 0 | Status: DISCONTINUED | OUTPATIENT
Start: 2024-05-03 | End: 2024-05-03

## 2024-05-03 RX ORDER — CLOPIDOGREL BISULFATE 75 MG/1
300 TABLET, FILM COATED ORAL ONCE
Refills: 0 | Status: COMPLETED | OUTPATIENT
Start: 2024-05-03 | End: 2024-05-03

## 2024-05-03 RX ORDER — NOREPINEPHRINE BITARTRATE/D5W 8 MG/250ML
0.05 PLASTIC BAG, INJECTION (ML) INTRAVENOUS
Qty: 16 | Refills: 0 | Status: DISCONTINUED | OUTPATIENT
Start: 2024-05-03 | End: 2024-05-07

## 2024-05-03 RX ORDER — DEXTROSE 50 % IN WATER 50 %
50 SYRINGE (ML) INTRAVENOUS ONCE
Refills: 0 | Status: COMPLETED | OUTPATIENT
Start: 2024-05-03 | End: 2024-05-03

## 2024-05-03 RX ORDER — VANCOMYCIN HCL 1 G
1000 VIAL (EA) INTRAVENOUS ONCE
Refills: 0 | Status: COMPLETED | OUTPATIENT
Start: 2024-05-03 | End: 2024-05-03

## 2024-05-03 RX ORDER — HYDRALAZINE HCL 50 MG
10 TABLET ORAL ONCE
Refills: 0 | Status: COMPLETED | OUTPATIENT
Start: 2024-05-03 | End: 2024-05-03

## 2024-05-03 RX ORDER — LABETALOL HCL 100 MG
5 TABLET ORAL ONCE
Refills: 0 | Status: COMPLETED | OUTPATIENT
Start: 2024-05-03 | End: 2024-05-03

## 2024-05-03 RX ORDER — SODIUM ZIRCONIUM CYCLOSILICATE 10 G/10G
10 POWDER, FOR SUSPENSION ORAL ONCE
Refills: 0 | Status: DISCONTINUED | OUTPATIENT
Start: 2024-05-03 | End: 2024-05-03

## 2024-05-03 RX ORDER — HYDRALAZINE HCL 50 MG
5 TABLET ORAL ONCE
Refills: 0 | Status: COMPLETED | OUTPATIENT
Start: 2024-05-03 | End: 2024-05-03

## 2024-05-03 RX ORDER — PIPERACILLIN AND TAZOBACTAM 4; .5 G/20ML; G/20ML
3.38 INJECTION, POWDER, LYOPHILIZED, FOR SOLUTION INTRAVENOUS EVERY 12 HOURS
Refills: 0 | Status: COMPLETED | OUTPATIENT
Start: 2024-05-04 | End: 2024-05-07

## 2024-05-03 RX ORDER — ASPIRIN/CALCIUM CARB/MAGNESIUM 324 MG
325 TABLET ORAL ONCE
Refills: 0 | Status: COMPLETED | OUTPATIENT
Start: 2024-05-03 | End: 2024-05-03

## 2024-05-03 RX ORDER — ACETAMINOPHEN 500 MG
1000 TABLET ORAL ONCE
Refills: 0 | Status: COMPLETED | OUTPATIENT
Start: 2024-05-03 | End: 2024-05-03

## 2024-05-03 RX ADMIN — Medication 10 MILLIGRAM(S): at 14:32

## 2024-05-03 RX ADMIN — CHLORHEXIDINE GLUCONATE 15 MILLILITER(S): 213 SOLUTION TOPICAL at 05:11

## 2024-05-03 RX ADMIN — Medication 100 GRAM(S): at 23:40

## 2024-05-03 RX ADMIN — PIPERACILLIN AND TAZOBACTAM 200 GRAM(S): 4; .5 INJECTION, POWDER, LYOPHILIZED, FOR SOLUTION INTRAVENOUS at 17:59

## 2024-05-03 RX ADMIN — Medication 25 MILLIGRAM(S): at 16:41

## 2024-05-03 RX ADMIN — Medication 5 MILLIGRAM(S): at 17:37

## 2024-05-03 RX ADMIN — SODIUM CHLORIDE 500 MILLILITER(S): 9 INJECTION, SOLUTION INTRAVENOUS at 21:31

## 2024-05-03 RX ADMIN — Medication 400 MILLIGRAM(S): at 17:38

## 2024-05-03 RX ADMIN — Medication 5 MILLIGRAM(S): at 16:43

## 2024-05-03 RX ADMIN — Medication 5 MILLIGRAM(S): at 08:40

## 2024-05-03 RX ADMIN — Medication 1 APPLICATION(S): at 18:22

## 2024-05-03 RX ADMIN — PROPOFOL 6.31 MICROGRAM(S)/KG/MIN: 10 INJECTION, EMULSION INTRAVENOUS at 20:02

## 2024-05-03 RX ADMIN — Medication 50 MILLILITER(S): at 02:11

## 2024-05-03 RX ADMIN — CHLORHEXIDINE GLUCONATE 15 MILLILITER(S): 213 SOLUTION TOPICAL at 18:22

## 2024-05-03 RX ADMIN — CHLORHEXIDINE GLUCONATE 1 APPLICATION(S): 213 SOLUTION TOPICAL at 05:11

## 2024-05-03 RX ADMIN — Medication 5 MILLIGRAM(S): at 08:11

## 2024-05-03 RX ADMIN — Medication 2: at 05:15

## 2024-05-03 RX ADMIN — HEPARIN SODIUM 650 UNIT(S)/HR: 5000 INJECTION INTRAVENOUS; SUBCUTANEOUS at 22:14

## 2024-05-03 RX ADMIN — Medication 2: at 23:30

## 2024-05-03 RX ADMIN — Medication 1000 MILLIGRAM(S): at 17:49

## 2024-05-03 RX ADMIN — Medication 9.87 MICROGRAM(S)/KG/MIN: at 20:02

## 2024-05-03 RX ADMIN — CLOPIDOGREL BISULFATE 300 MILLIGRAM(S): 75 TABLET, FILM COATED ORAL at 22:18

## 2024-05-03 RX ADMIN — INSULIN HUMAN 5 UNIT(S): 100 INJECTION, SOLUTION SUBCUTANEOUS at 02:11

## 2024-05-03 RX ADMIN — Medication 325 MILLIGRAM(S): at 22:18

## 2024-05-03 RX ADMIN — VASOPRESSIN 4.5 UNIT(S)/MIN: 20 INJECTION INTRAVENOUS at 23:02

## 2024-05-03 RX ADMIN — HEPARIN SODIUM 5000 UNIT(S): 5000 INJECTION INTRAVENOUS; SUBCUTANEOUS at 05:12

## 2024-05-03 RX ADMIN — Medication 2: at 18:37

## 2024-05-03 RX ADMIN — Medication 1 APPLICATION(S): at 05:11

## 2024-05-03 RX ADMIN — PROPOFOL 6.31 MICROGRAM(S)/KG/MIN: 10 INJECTION, EMULSION INTRAVENOUS at 23:02

## 2024-05-03 RX ADMIN — HEPARIN SODIUM 5000 UNIT(S): 5000 INJECTION INTRAVENOUS; SUBCUTANEOUS at 18:22

## 2024-05-03 RX ADMIN — Medication 1: at 12:47

## 2024-05-03 RX ADMIN — Medication 250 MILLIGRAM(S): at 18:22

## 2024-05-03 RX ADMIN — PROPOFOL 6.31 MICROGRAM(S)/KG/MIN: 10 INJECTION, EMULSION INTRAVENOUS at 14:28

## 2024-05-03 NOTE — PROGRESS NOTE ADULT - SUBJECTIVE AND OBJECTIVE BOX
VASCULAR SURGERY DAILY PROGRESS NOTE:     SUBJECTIVE/ROS:   Patient seen and evaluated on AM rounds.        OBJECTIVE:  Vital Signs Last 24 Hrs  T(C): 36.6 (03 May 2024 08:00), Max: 37.8 (02 May 2024 12:00)  T(F): 97.9 (03 May 2024 08:00), Max: 100 (02 May 2024 12:00)  HR: 75 (03 May 2024 08:50) (47 - 94)  BP: 188/74 (03 May 2024 08:50) (83/60 - 220/95)  BP(mean): 96 (03 May 2024 08:50) (48 - 127)  RR: 19 (03 May 2024 08:50) (15 - 24)  SpO2: 99% (03 May 2024 08:50) (97% - 100%)    Parameters below as of 03 May 2024 04:00  Patient On (Oxygen Delivery Method): ventilator    O2 Concentration (%): 40  I&O's Detail    02 May 2024 07:01  -  03 May 2024 07:00  --------------------------------------------------------  IN:    Norepinephrine: 145.3 mL    Propofol: 145.2 mL  Total IN: 290.5 mL    OUT:  Total OUT: 0 mL    Total NET: 290.5 mL        Daily     Daily Weight in k (03 May 2024 04:00)  MEDICATIONS  (STANDING):  chlorhexidine 0.12% Liquid 15 milliLiter(s) Oral Mucosa every 12 hours  chlorhexidine 2% Cloths 1 Application(s) Topical <User Schedule>  dextrose 10% Bolus 125 milliLiter(s) IV Bolus once  dextrose 5%. 1000 milliLiter(s) (50 mL/Hr) IV Continuous <Continuous>  dextrose 5%. 1000 milliLiter(s) (100 mL/Hr) IV Continuous <Continuous>  dextrose 50% Injectable 25 Gram(s) IV Push once  dextrose 50% Injectable 12.5 Gram(s) IV Push once  dextrose 50% Injectable 50 milliLiter(s) IV Push once  epoetin reilly (EPOGEN) Injectable 79605 Unit(s) IV Push <User Schedule>  glucagon  Injectable 1 milliGRAM(s) IntraMuscular once  heparin   Injectable 5000 Unit(s) SubCutaneous every 12 hours  insulin lispro (ADMELOG) corrective regimen sliding scale   SubCutaneous every 6 hours  norepinephrine Infusion 0.1 MICROgram(s)/kG/Min (9.87 mL/Hr) IV Continuous <Continuous>  petrolatum Ophthalmic Ointment 1 Application(s) Both EYES two times a day  propofol Infusion 20 MICROgram(s)/kG/Min (6.31 mL/Hr) IV Continuous <Continuous>    MEDICATIONS  (PRN):  dextrose Oral Gel 15 Gram(s) Oral once PRN Blood Glucose LESS THAN 70 milliGRAM(s)/deciliter      Labs:                        12.2   12.26 )-----------( 113      ( 03 May 2024 00:30 )             38.1     05-    136  |  93<L>  |  51<H>  ----------------------------<  153<H>  5.8<H>   |  17<L>  |  7.72<H>    Ca    8.9      03 May 2024 00:30  Phos  6.6     05-03  Mg     2.60     05-03    TPro  7.0  /  Alb  4.0  /  TBili  0.8  /  DBili  x   /  AST  12  /  ALT  10  /  AlkPhos  82  05-03    PT/INR - ( 03 May 2024 00:30 )   PT: 13.1 sec;   INR: 1.16 ratio         PTT - ( 03 May 2024 00:30 )  PTT:26.1 sec  Urinalysis Basic - ( 03 May 2024 00:30 )    Color: x / Appearance: x / SG: x / pH: x  Gluc: 153 mg/dL / Ketone: x  / Bili: x / Urobili: x   Blood: x / Protein: x / Nitrite: x   Leuk Esterase: x / RBC: x / WBC x   Sq Epi: x / Non Sq Epi: x / Bacteria: x          Physical Exam:  General: sedated   Respiratory: intubated  Gastrointestinal: soft, nontender, nondistended  Extremities: FROM, warm  - RUE: RC AVF w/ faint thrill  - L groin shiley removed. Groin soft, no evidence of hematoma   Neurological: A+Ox3

## 2024-05-03 NOTE — PROCEDURE NOTE - ADDITIONAL PROCEDURE DETAILS
Bilateral lung sliding noted under POCUS pre and post procedure. Bilateral lung sliding noted under POCUS pre and post procedure.  Bedside agitated saline bubble test performed via distal port at bedside with agitated saline bubbles noted going into right sided heart under subcostal cardiac visualization. Central line may be used immediately. CXR ordered. Bilateral lung sliding noted under POCUS pre and post procedure.  Bedside agitated saline bubble test performed via distal port at bedside with agitated saline bubbles noted going into right sided heart under subcostal cardiac visualization. No shunt noted. Central line may be used immediately. Image saved. CXR ordered. Bilateral lung sliding noted under POCUS pre and post procedure.  Bedside agitated saline bubble test performed via distal port at bedside with agitated saline bubbles noted going into right sided heart under subcostal cardiac visualization. No shunt noted. Central line may be used immediately. Image saved.

## 2024-05-03 NOTE — PROGRESS NOTE ADULT - SUBJECTIVE AND OBJECTIVE BOX
Patient is a 73y Male     Patient is a 73y old  Male who presents with a chief complaint of Unstable angina, abn EKG (02 May 2024 17:09)      HPI:  73-year-old male past medical history hypertension, ESRD on dialysis Monday Wednesday Friday, diabetes insulin-dependent presents with hiccups patient endorses persistent hiccups for the last 2 days. found to have peaked T waves, a new finding. denies dizziness, N/V, denies CP, palps, abd pain (29 Apr 2024 21:15)      PAST MEDICAL & SURGICAL HISTORY:  Diabetes      Benign essential HTN      HLD (hyperlipidemia)      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      ESRD on hemodialysis      Arteriovenous fistula          MEDICATIONS  (STANDING):  chlorhexidine 0.12% Liquid 15 milliLiter(s) Oral Mucosa every 12 hours  chlorhexidine 2% Cloths 1 Application(s) Topical <User Schedule>  dextrose 10% Bolus 125 milliLiter(s) IV Bolus once  dextrose 5%. 1000 milliLiter(s) (50 mL/Hr) IV Continuous <Continuous>  dextrose 5%. 1000 milliLiter(s) (100 mL/Hr) IV Continuous <Continuous>  dextrose 50% Injectable 12.5 Gram(s) IV Push once  dextrose 50% Injectable 50 milliLiter(s) IV Push once  dextrose 50% Injectable 25 Gram(s) IV Push once  epoetin reilly (EPOGEN) Injectable 76721 Unit(s) IV Push <User Schedule>  glucagon  Injectable 1 milliGRAM(s) IntraMuscular once  heparin   Injectable 5000 Unit(s) SubCutaneous every 12 hours  insulin lispro (ADMELOG) corrective regimen sliding scale   SubCutaneous every 6 hours  norepinephrine Infusion 0.1 MICROgram(s)/kG/Min (9.87 mL/Hr) IV Continuous <Continuous>  petrolatum Ophthalmic Ointment 1 Application(s) Both EYES two times a day  propofol Infusion 20 MICROgram(s)/kG/Min (6.31 mL/Hr) IV Continuous <Continuous>      Allergies    No Known Allergies    Intolerances        SOCIAL HISTORY:  Denies ETOh,Smoking,     FAMILY HISTORY:  FHx: diabetes mellitus (Father, Aunt)        REVIEW OF SYSTEMS:    CONSTITUTIONAL: No weakness, fevers or chills  EYES/ENT: No visual changes;  No vertigo or throat pain   NECK: No pain or stiffness  RESPIRATORY: No cough, wheezing, hemoptysis; No shortness of breath  CARDIOVASCULAR: No chest pain or palpitations  GASTROINTESTINAL: No abdominal or epigastric pain. No nausea, vomiting, or hematemesis; No diarrhea or constipation. No melena or hematochezia.  GENITOURINARY: No dysuria, frequency or hematuria  NEUROLOGICAL: No numbness or weakness  SKIN: No itching, burning, rashes, or lesions   All other review of systems is negative unless indicated above.    VITAL:  T(C): , Max: 37.8 (05-02-24 @ 12:00)  T(F): , Max: 100 (05-02-24 @ 12:00)  HR: 58 (05-03-24 @ 06:00)  BP: 170/78 (05-03-24 @ 06:00)  BP(mean): 101 (05-03-24 @ 06:00)  RR: 18 (05-03-24 @ 06:00)  SpO2: 100% (05-03-24 @ 06:00)  Wt(kg): --    I and O's:    05-01 @ 07:01  -  05-02 @ 07:00  --------------------------------------------------------  IN: 900 mL / OUT: 2241 mL / NET: -1341 mL    05-02 @ 07:01  -  05-03 @ 06:23  --------------------------------------------------------  IN: 290.5 mL / OUT: 0 mL / NET: 290.5 mL          PHYSICAL EXAM:    Constitutional: NAD  HEENT: PERRLA,   Neck: No JVD  Respiratory: CTA B/L  Cardiovascular: S1 and S2  Gastrointestinal: BS+, soft, NT/ND  Extremities: No peripheral edema  Neurological: A/O x 3, no focal deficits  Psychiatric: Normal mood, normal affect  : No Abbasi  Skin: No rashes  Access: Not applicable  Back: No CVA tenderness    LABS:                        12.2   12.26 )-----------( 113      ( 03 May 2024 00:30 )             38.1     05-03    136  |  93<L>  |  51<H>  ----------------------------<  153<H>  5.8<H>   |  17<L>  |  7.72<H>    Ca    8.9      03 May 2024 00:30  Phos  6.6     05-03  Mg     2.60     05-03    TPro  7.0  /  Alb  4.0  /  TBili  0.8  /  DBili  x   /  AST  12  /  ALT  10  /  AlkPhos  82  05-03          RADIOLOGY & ADDITIONAL STUDIES:

## 2024-05-03 NOTE — PROGRESS NOTE ADULT - SUBJECTIVE AND OBJECTIVE BOX
[facility], Division of Infectious Diseases  AUGUST Carbajal Y. Patel, S. Shah, G. Brian  [phone]  ([phone] - weekdays after 5pm and weekends)    Name: JIMMY BLAND  Age/Gender: 73y Male  MRN: [id number]    Interval History:  Notes reviewed.   Afebrile.   remained intubated this AM    Allergies: No Known Allergies      Objective:  Vitals:   T(F): 97.2 (05-03-24 @ 12:00), Max: 99.4 (05-02-24 @ 16:00)  HR: 73 (05-03-24 @ 12:00) (56 - 79)  BP: 168/69 (05-03-24 @ 12:00) (112/75 - 220/95)  RR: 18 (05-03-24 @ 12:00) (18 - 19)  SpO2: 100% (05-03-24 @ 12:00) (97% - 100%)  Physical Examination:  General: no acute distress  HEENT: anicteric, ETT  Lungs: clear to auscultation bilaterally  Heart: S1, S2, normal rate.   Abdomen: Soft. Nondistended.  Neuro: sedated  Extremities: No LE edema.   Skin: Warm. Dry. No rash.    Laboratory Studies:  CBC:                       12.2   12.26 )-----------( 113      ( 03 May 2024 00:30 )             38.1     WBC Trend:  12.26 05-03-24 @ 00:30  24.85 05-02-24 @ 03:37  13.32 05-01-24 @ 22:42  9.64 05-01-24 @ 05:48  7.12 04-30-24 @ 06:03  12.39 04-29-24 @ 16:19    CMP: 05-03    136  |  93<L>  |  51<H>  ----------------------------<  153<H>  5.8<H>   |  17<L>  |  7.72<H>    Ca    8.9      03 May 2024 00:30  Phos  6.6     05-03  Mg     2.60     05-03    TPro  7.0  /  Alb  4.0  /  TBili  0.8  /  DBili  x   /  AST  12  /  ALT  10  /  AlkPhos  82  05-03      LIVER FUNCTIONS - ( 03 May 2024 00:30 )  Alb: 4.0 g/dL / Pro: 7.0 g/dL / ALK PHOS: 82 U/L / ALT: 10 U/L / AST: 12 U/L / GGT: x             Urinalysis Basic - ( 03 May 2024 00:30 )    Color: x / Appearance: x / SG: x / pH: x  Gluc: 153 mg/dL / Ketone: x  / Bili: x / Urobili: x   Blood: x / Protein: x / Nitrite: x   Leuk Esterase: x / RBC: x / WBC x   Sq Epi: x / Non Sq Epi: x / Bacteria: x      Microbiology: reviewed     Culture - Fungal, CSF (collected 05-02-24 @ 12:15)  Source: .CSF CSF lumbar  Preliminary Report (05-03-24 @ 08:49):    Testing in progress    Culture - CSF with Gram Stain (collected 05-02-24 @ 12:15)  Source: .CSF CSF lumbar  Gram Stain (05-02-24 @ 13:51):    polymorphonuclear leukocytes    No organisms seen    by cytocentrifuge  Preliminary Report (05-03-24 @ 08:51):    No growth to date    Culture - Blood (collected 05-02-24 @ 03:45)  Source: .Blood Blood-Peripheral  Preliminary Report (05-03-24 @ 10:06):    No growth at 24 hours    Culture - Blood (collected 05-02-24 @ 03:15)  Source: .Blood Blood-Venous  Preliminary Report (05-03-24 @ 10:06):    No growth at 24 hours      CSF PCR Result: NotDetec (05-02-24 @ 12:02)    Radiology: reviewed     Medications:  chlorhexidine 0.12% Liquid 15 milliLiter(s) Oral Mucosa every 12 hours  chlorhexidine 2% Cloths 1 Application(s) Topical <User Schedule>  dextrose 10% Bolus 125 milliLiter(s) IV Bolus once  dextrose 5%. 1000 milliLiter(s) IV Continuous <Continuous>  dextrose 5%. 1000 milliLiter(s) IV Continuous <Continuous>  dextrose 50% Injectable 12.5 Gram(s) IV Push once  dextrose 50% Injectable 25 Gram(s) IV Push once  dextrose 50% Injectable 50 milliLiter(s) IV Push once  dextrose Oral Gel 15 Gram(s) Oral once PRN  epoetin reilly (EPOGEN) Injectable 71964 Unit(s) IV Push <User Schedule>  glucagon  Injectable 1 milliGRAM(s) IntraMuscular once  heparin   Injectable 5000 Unit(s) SubCutaneous every 12 hours  hydrALAZINE 25 milliGRAM(s) Oral three times a day  insulin lispro (ADMELOG) corrective regimen sliding scale   SubCutaneous every 6 hours  norepinephrine Infusion 0.1 MICROgram(s)/kG/Min IV Continuous <Continuous>  petrolatum Ophthalmic Ointment 1 Application(s) Both EYES two times a day  propofol Infusion 20 MICROgram(s)/kG/Min IV Continuous <Continuous>    Antimicrobials:

## 2024-05-03 NOTE — PROGRESS NOTE ADULT - SUBJECTIVE AND OBJECTIVE BOX
[facility]   MD ELIANE BHAGAT MD RUORU WONG, PA    TEL:  FROM 9 AM to 5 PM ---OFFICE: [phone]  AVAILABLE ON TEAMS    FROM 5 PM - 9 AM PLEASE CALL ANSWERING SERVICE: [phone]    RENAL FOLLOW UP NOTE--Date of Service 05-03-24 @ 11:26  --------------------------------------------------------------------------------  HPI:      Pt seen and examined at bedside.       PAST HISTORY  --------------------------------------------------------------------------------  No significant changes to PMH, PSH, FHx, SHx, unless otherwise noted    ALLERGIES & MEDICATIONS  --------------------------------------------------------------------------------  Allergies    No Known Allergies    Intolerances      Standing Inpatient Medications  chlorhexidine 0.12% Liquid 15 milliLiter(s) Oral Mucosa every 12 hours  chlorhexidine 2% Cloths 1 Application(s) Topical <User Schedule>  dextrose 10% Bolus 125 milliLiter(s) IV Bolus once  dextrose 5%. 1000 milliLiter(s) IV Continuous <Continuous>  dextrose 5%. 1000 milliLiter(s) IV Continuous <Continuous>  dextrose 50% Injectable 25 Gram(s) IV Push once  dextrose 50% Injectable 12.5 Gram(s) IV Push once  dextrose 50% Injectable 50 milliLiter(s) IV Push once  epoetin reilly (EPOGEN) Injectable 96581 Unit(s) IV Push <User Schedule>  glucagon  Injectable 1 milliGRAM(s) IntraMuscular once  heparin   Injectable 5000 Unit(s) SubCutaneous every 12 hours  hydrALAZINE 25 milliGRAM(s) Oral three times a day  insulin lispro (ADMELOG) corrective regimen sliding scale   SubCutaneous every 6 hours  norepinephrine Infusion 0.1 MICROgram(s)/kG/Min IV Continuous <Continuous>  petrolatum Ophthalmic Ointment 1 Application(s) Both EYES two times a day  propofol Infusion 20 MICROgram(s)/kG/Min IV Continuous <Continuous>    PRN Inpatient Medications  dextrose Oral Gel 15 Gram(s) Oral once PRN      REVIEW OF SYSTEMS  --------------------------------------------------------------------------------  unable to obtain    VITALS/PHYSICAL EXAM  --------------------------------------------------------------------------------  T(C): 36.6 (05-03-24 @ 08:00), Max: 37.8 (05-02-24 @ 12:00)  HR: 66 (05-03-24 @ 11:13) (47 - 79)  BP: 170/67 (05-03-24 @ 10:10) (110/41 - 220/95)  RR: 18 (05-03-24 @ 10:10) (18 - 19)  SpO2: 100% (05-03-24 @ 11:13) (97% - 100%)  Wt(kg): --        05-02-24 @ 07:01  -  05-03-24 @ 07:00  --------------------------------------------------------  IN: 290.5 mL / OUT: 0 mL / NET: 290.5 mL      Physical Exam:  	Gen: NAD  	HEENT: ett  	Pulm: CTA B/L  	CV: S1S2  	Abd: Soft, +BS  	Ext: No LE edema B/L                      Neuro: sedated  	Skin: Warm and Dry   	Vascular access: NO HD catheter           SIA belle  LABS/STUDIES  --------------------------------------------------------------------------------              12.2   12.26 >-----------<  113      [05-03-24 @ 00:30]              38.1     136  |  93  |  51  ----------------------------<  153      [05-03-24 @ 00:30]  5.8   |  17  |  7.72        Ca     8.9     [05-03-24 @ 00:30]      Mg     2.60     [05-03-24 @ 00:30]      Phos  6.6     [05-03-24 @ 00:30]    TPro  7.0  /  Alb  4.0  /  TBili  0.8  /  DBili  x   /  AST  12  /  ALT  10  /  AlkPhos  82  [05-03-24 @ 00:30]    PT/INR: PT 13.1 , INR 1.16       [05-03-24 @ 00:30]  PTT: 26.1       [05-03-24 @ 00:30]      Creatinine Trend:  SCr 7.72 [05-03 @ 00:30]  SCr 4.98 [05-02 @ 03:37]  SCr 7.58 [05-01 @ 22:42]  SCr 7.16 [05-01 @ 15:17]  SCr 9.02 [05-01 @ 05:48]    Urinalysis - [05-03-24 @ 00:30]      Color  / Appearance  / SG  / pH       Gluc 153 / Ketone   / Bili  / Urobili        Blood  / Protein  / Leuk Est  / Nitrite       RBC  / WBC  / Hyaline  / Gran  / Sq Epi  / Non Sq Epi  / Bacteria       Iron 47, TIBC --, %sat --      [05-01-24 @ 06:44]  TSH 2.79      [04-30-24 @ 06:03]    HBsAb <3.0      [05-01-24 @ 14:42]  HBcAb Nonreact      [05-01-24 @ 14:42]

## 2024-05-03 NOTE — PROGRESS NOTE ADULT - ASSESSMENT
73M PMHx HTN, ESRD, IDDM p/w hiccups and started on baclofen with concern for AMS overnight and desaturation, ?cheye nascimento respirations. Labs with numerous metabolic derangements. CTH with bifrontal encephalomalacia, likley traumatic.   WBC inc significantly, now intubated. Exam limited as on propofol, also on pressors.   s/p LP for meningitis concerns however appears bland - not on antibiotics    AMS possibly due to baclofen toxicity  Intractable hiccups  hypoxic resp failure  ESRD on HD    vent mgmt per ICU  HD per nephro  f/u remainder of CSF  monitor off Abx per ID  consider MRI brain ww/o when stable - there may be false dural enhancement post LP  24 veeg  Dvt ppx

## 2024-05-03 NOTE — PROGRESS NOTE ADULT - SUBJECTIVE AND OBJECTIVE BOX
Patient is a 73y old  Male who presents with a chief complaint of Unstable angina, abn EKG (03 May 2024 14:01)      SUBJECTIVE / OVERNIGHT EVENTS: transferred to MICU 2/2 encephalopathy, etiology not clear. remains intubated for airway protection, sedated. on pressors, ID, Neuro following. being observed off Abx. esrd, had missed HD prior to AMS 2/2 clotted RUE AVF. fistulogram when medically stable. at present HD via RIght IJ Shiley. NGT in place in stomach. LP done, no sign of meningitis.     MEDICATIONS  (STANDING):  chlorhexidine 0.12% Liquid 15 milliLiter(s) Oral Mucosa every 12 hours  chlorhexidine 2% Cloths 1 Application(s) Topical <User Schedule>  dextrose 10% Bolus 125 milliLiter(s) IV Bolus once  dextrose 5%. 1000 milliLiter(s) (50 mL/Hr) IV Continuous <Continuous>  dextrose 5%. 1000 milliLiter(s) (100 mL/Hr) IV Continuous <Continuous>  dextrose 50% Injectable 12.5 Gram(s) IV Push once  dextrose 50% Injectable 25 Gram(s) IV Push once  dextrose 50% Injectable 50 milliLiter(s) IV Push once  epoetin reilly (EPOGEN) Injectable 35602 Unit(s) IV Push <User Schedule>  glucagon  Injectable 1 milliGRAM(s) IntraMuscular once  heparin   Injectable 5000 Unit(s) SubCutaneous every 12 hours  hydrALAZINE 25 milliGRAM(s) Oral three times a day  insulin lispro (ADMELOG) corrective regimen sliding scale   SubCutaneous every 6 hours  norepinephrine Infusion 0.1 MICROgram(s)/kG/Min (9.87 mL/Hr) IV Continuous <Continuous>  petrolatum Ophthalmic Ointment 1 Application(s) Both EYES two times a day  propofol Infusion 20 MICROgram(s)/kG/Min (6.31 mL/Hr) IV Continuous <Continuous>  vancomycin  IVPB 1000 milliGRAM(s) IV Intermittent once    MEDICATIONS  (PRN):  dextrose Oral Gel 15 Gram(s) Oral once PRN Blood Glucose LESS THAN 70 milliGRAM(s)/deciliter      Vital Signs Last 24 Hrs  T(F): 103 (05-03-24 @ 16:30), Max: 103 (05-03-24 @ 16:30)  HR: 89 (05-03-24 @ 18:00) (56 - 103)  BP: 98/55 (05-03-24 @ 18:00) (98/55 - 220/95)  RR: 18 (05-03-24 @ 18:00) (18 - 22)  SpO2: 97% (05-03-24 @ 18:00) (93% - 100%)  Telemetry:   CAPILLARY BLOOD GLUCOSE      POCT Blood Glucose.: 188 mg/dL (03 May 2024 12:32)  POCT Blood Glucose.: 250 mg/dL (03 May 2024 05:10)  POCT Blood Glucose.: 268 mg/dL (03 May 2024 02:46)  POCT Blood Glucose.: 156 mg/dL (03 May 2024 02:10)  POCT Blood Glucose.: 141 mg/dL (02 May 2024 23:13)    I&O's Summary    02 May 2024 07:01  -  03 May 2024 07:00  --------------------------------------------------------  IN: 290.5 mL / OUT: 0 mL / NET: 290.5 mL    03 May 2024 07:01  -  03 May 2024 18:21  --------------------------------------------------------  IN: 560.5 mL / OUT: 0 mL / NET: 560.5 mL        PHYSICAL EXAM:  GENERAL: NAD, well-developed  HEAD:  Atraumatic, Normocephalic  EYES: EOMI, PERRLA, conjunctiva and sclera clear  NECK: Supple, No JVD  CHEST/LUNG: Clear to auscultation bilaterally; No wheeze  HEART: Regular rate and rhythm; No murmurs, rubs, or gallops  ABDOMEN: Soft, Nontender, Nondistended; Bowel sounds present  EXTREMITIES:  2+ Peripheral Pulses, No clubbing, cyanosis, or edema  PSYCH: AAOx3  NEUROLOGY: non-focal  SKIN: No rashes or lesions    LABS:                        12.2   12.26 )-----------( 113      ( 03 May 2024 00:30 )             38.1     05-03    136  |  93<L>  |  51<H>  ----------------------------<  153<H>  5.8<H>   |  17<L>  |  7.72<H>    Ca    8.9      03 May 2024 00:30  Phos  6.6     05-03  Mg     2.60     05-03    TPro  7.0  /  Alb  4.0  /  TBili  0.8  /  DBili  x   /  AST  12  /  ALT  10  /  AlkPhos  82  05-03    PT/INR - ( 03 May 2024 00:30 )   PT: 13.1 sec;   INR: 1.16 ratio         PTT - ( 03 May 2024 00:30 )  PTT:26.1 sec      Urinalysis Basic - ( 03 May 2024 00:30 )    Color: x / Appearance: x / SG: x / pH: x  Gluc: 153 mg/dL / Ketone: x  / Bili: x / Urobili: x   Blood: x / Protein: x / Nitrite: x   Leuk Esterase: x / RBC: x / WBC x   Sq Epi: x / Non Sq Epi: x / Bacteria: x        RADIOLOGY & ADDITIONAL TESTS:    Imaging Personally Reviewed:    Consultant(s) Notes Reviewed:      Care Discussed with Consultants/Other Providers:

## 2024-05-03 NOTE — PROGRESS NOTE ADULT - ASSESSMENT
73-year-old male past medical history hypertension, ESRD on dialysis Monday Wednesday Friday, diabetes insulin-dependent presents with hiccups patient endorses persistent hiccups for the last 2 days. Nephrology consulted for HD needs.    A/P  ESRD:  Center: [place]  Nephrologist: Dr. Hardwick  Access: R AVF  MWF schedule  Vascular for fistulogram   s/p femoral shiley on 5/1 now removed  HD today after new Shiley discussed with MICU team  Consent obtained and placed in ED chart  Renal diet  Monitor BMP    Hyperkalemia  s/p HD on 5/1  HD TODAY after catheter   Low K Diet  Monitor closely     HTN:  Fluctuating  Care per ICU  Monitor BP    Anemia:  Above goal  Monitor hb    CKD-MBD  Get PTH  Monitor Ca, PO4 daily

## 2024-05-03 NOTE — PROGRESS NOTE ADULT - SUBJECTIVE AND OBJECTIVE BOX
Tiffany Martínez, PGY1    Patient is a 73y old  Male who presents with a chief complaint of Unstable angina, abn EKG (03 May 2024 06:23)      SUBJECTIVE / OVERNIGHT EVENTS: NAEO. Pt denies chest pain, SOB, N/V, fever/chills, or changes in bowel movements.    MEDICATIONS  (STANDING):  chlorhexidine 0.12% Liquid 15 milliLiter(s) Oral Mucosa every 12 hours  chlorhexidine 2% Cloths 1 Application(s) Topical <User Schedule>  dextrose 10% Bolus 125 milliLiter(s) IV Bolus once  dextrose 5%. 1000 milliLiter(s) (50 mL/Hr) IV Continuous <Continuous>  dextrose 5%. 1000 milliLiter(s) (100 mL/Hr) IV Continuous <Continuous>  dextrose 50% Injectable 12.5 Gram(s) IV Push once  dextrose 50% Injectable 25 Gram(s) IV Push once  dextrose 50% Injectable 50 milliLiter(s) IV Push once  epoetin reilly (EPOGEN) Injectable 67965 Unit(s) IV Push <User Schedule>  glucagon  Injectable 1 milliGRAM(s) IntraMuscular once  heparin   Injectable 5000 Unit(s) SubCutaneous every 12 hours  insulin lispro (ADMELOG) corrective regimen sliding scale   SubCutaneous every 6 hours  norepinephrine Infusion 0.1 MICROgram(s)/kG/Min (9.87 mL/Hr) IV Continuous <Continuous>  petrolatum Ophthalmic Ointment 1 Application(s) Both EYES two times a day  propofol Infusion 20 MICROgram(s)/kG/Min (6.31 mL/Hr) IV Continuous <Continuous>    MEDICATIONS  (PRN):  dextrose Oral Gel 15 Gram(s) Oral once PRN Blood Glucose LESS THAN 70 milliGRAM(s)/deciliter      CAPILLARY BLOOD GLUCOSE      POCT Blood Glucose.: 250 mg/dL (03 May 2024 05:10)  POCT Blood Glucose.: 268 mg/dL (03 May 2024 02:46)  POCT Blood Glucose.: 156 mg/dL (03 May 2024 02:10)  POCT Blood Glucose.: 141 mg/dL (02 May 2024 23:13)  POCT Blood Glucose.: 154 mg/dL (02 May 2024 17:52)  POCT Blood Glucose.: 185 mg/dL (02 May 2024 12:59)    I&O's Summary    02 May 2024 07:01  -  03 May 2024 07:00  --------------------------------------------------------  IN: 290.5 mL / OUT: 0 mL / NET: 290.5 mL        Vital Signs Last 24 Hrs  T(C): 36.7 (03 May 2024 04:00), Max: 37.8 (02 May 2024 12:00)  T(F): 98 (03 May 2024 04:00), Max: 100 (02 May 2024 12:00)  HR: 58 (03 May 2024 06:00) (47 - 94)  BP: 170/78 (03 May 2024 06:00) (83/60 - 205/68)  BP(mean): 101 (03 May 2024 06:00) (48 - 102)  RR: 18 (03 May 2024 06:00) (12 - 24)  SpO2: 100% (03 May 2024 06:00) (97% - 100%)    Parameters below as of 03 May 2024 04:00  Patient On (Oxygen Delivery Method): ventilator    O2 Concentration (%): 40    PHYSICAL EXAM:  GENERAL: NAD, well-developed, well-nourished  HEAD: Atraumatic, Normocephalic  EYES: Conjunctiva and sclera clear  CHEST/LUNG: Clear to auscultation bilaterally; No wheezes or crackles  HEART: Normal S1/S2; Regular rate and rhythm; No murmurs, rubs, or gallops  ABDOMEN: Soft, Nontender, Nondistended; Bowel sounds present  EXTREMITIES: No clubbing, cyanosis, or edema  PSYCH: A&Ox3      LABS:                        12.2   12.26 )-----------( 113      ( 03 May 2024 00:30 )             38.1      05-03    136  |  93<L>  |  51<H>  ----------------------------<  153<H>  5.8<H>   |  17<L>  |  7.72<H>    Ca    8.9      03 May 2024 00:30  Phos  6.6     05-03  Mg     2.60     05-03    TPro  7.0  /  Alb  4.0  /  TBili  0.8  /  DBili  x   /  AST  12  /  ALT  10  /  AlkPhos  82  05-03    PT/INR - ( 03 May 2024 00:30 )   PT: 13.1 sec;   INR: 1.16 ratio         PTT - ( 03 May 2024 00:30 )  PTT:26.1 sec      Urinalysis Basic - ( 03 May 2024 00:30 )    Color: x / Appearance: x / SG: x / pH: x  Gluc: 153 mg/dL / Ketone: x  / Bili: x / Urobili: x   Blood: x / Protein: x / Nitrite: x   Leuk Esterase: x / RBC: x / WBC x   Sq Epi: x / Non Sq Epi: x / Bacteria: x        RADIOLOGY & ADDITIONAL TESTS:     Tiffany Martínez, PGY1    Patient is a 73y old  Male who presents with a chief complaint of Unstable angina, abn EKG (03 May 2024 06:23)      SUBJECTIVE / OVERNIGHT EVENTS: NAEO. IJ shiley to be placed today.    MEDICATIONS  (STANDING):  chlorhexidine 0.12% Liquid 15 milliLiter(s) Oral Mucosa every 12 hours  chlorhexidine 2% Cloths 1 Application(s) Topical <User Schedule>  dextrose 10% Bolus 125 milliLiter(s) IV Bolus once  dextrose 5%. 1000 milliLiter(s) (50 mL/Hr) IV Continuous <Continuous>  dextrose 5%. 1000 milliLiter(s) (100 mL/Hr) IV Continuous <Continuous>  dextrose 50% Injectable 12.5 Gram(s) IV Push once  dextrose 50% Injectable 25 Gram(s) IV Push once  dextrose 50% Injectable 50 milliLiter(s) IV Push once  epoetin reilly (EPOGEN) Injectable 59016 Unit(s) IV Push <User Schedule>  glucagon  Injectable 1 milliGRAM(s) IntraMuscular once  heparin   Injectable 5000 Unit(s) SubCutaneous every 12 hours  insulin lispro (ADMELOG) corrective regimen sliding scale   SubCutaneous every 6 hours  norepinephrine Infusion 0.1 MICROgram(s)/kG/Min (9.87 mL/Hr) IV Continuous <Continuous>  petrolatum Ophthalmic Ointment 1 Application(s) Both EYES two times a day  propofol Infusion 20 MICROgram(s)/kG/Min (6.31 mL/Hr) IV Continuous <Continuous>    MEDICATIONS  (PRN):  dextrose Oral Gel 15 Gram(s) Oral once PRN Blood Glucose LESS THAN 70 milliGRAM(s)/deciliter      CAPILLARY BLOOD GLUCOSE      POCT Blood Glucose.: 250 mg/dL (03 May 2024 05:10)  POCT Blood Glucose.: 268 mg/dL (03 May 2024 02:46)  POCT Blood Glucose.: 156 mg/dL (03 May 2024 02:10)  POCT Blood Glucose.: 141 mg/dL (02 May 2024 23:13)  POCT Blood Glucose.: 154 mg/dL (02 May 2024 17:52)  POCT Blood Glucose.: 185 mg/dL (02 May 2024 12:59)    I&O's Summary    02 May 2024 07:01  -  03 May 2024 07:00  --------------------------------------------------------  IN: 290.5 mL / OUT: 0 mL / NET: 290.5 mL        Vital Signs Last 24 Hrs  T(C): 36.7 (03 May 2024 04:00), Max: 37.8 (02 May 2024 12:00)  T(F): 98 (03 May 2024 04:00), Max: 100 (02 May 2024 12:00)  HR: 58 (03 May 2024 06:00) (47 - 94)  BP: 170/78 (03 May 2024 06:00) (83/60 - 205/68)  BP(mean): 101 (03 May 2024 06:00) (48 - 102)  RR: 18 (03 May 2024 06:00) (12 - 24)  SpO2: 100% (03 May 2024 06:00) (97% - 100%)    Parameters below as of 03 May 2024 04:00  Patient On (Oxygen Delivery Method): ventilator    O2 Concentration (%): 40    PHYSICAL EXAM  GENERAL: ill-appearing   HEAD:  Atraumatic, Normocephalic  EYES: conjunctiva and sclera clear  ENT: Moist mucous membranes  NECK: Supple, No JVD  CHEST/LUNG:  rhonchi auscultated b/l   HEART: Regular rate and rhythm; No murmurs, rubs, or gallops  ABDOMEN: BSx4; Soft, nontender, nondistended  EXTREMITIES:   No clubbing, cyanosis, or edema  NERVOUS SYSTEM:  A&Ox0, no focal deficits   SKIN: No rashes or lesions        LABS:                        12.2   12.26 )-----------( 113      ( 03 May 2024 00:30 )             38.1      05-03    136  |  93<L>  |  51<H>  ----------------------------<  153<H>  5.8<H>   |  17<L>  |  7.72<H>    Ca    8.9      03 May 2024 00:30  Phos  6.6     05-03  Mg     2.60     05-03    TPro  7.0  /  Alb  4.0  /  TBili  0.8  /  DBili  x   /  AST  12  /  ALT  10  /  AlkPhos  82  05-03    PT/INR - ( 03 May 2024 00:30 )   PT: 13.1 sec;   INR: 1.16 ratio         PTT - ( 03 May 2024 00:30 )  PTT:26.1 sec      Urinalysis Basic - ( 03 May 2024 00:30 )    Color: x / Appearance: x / SG: x / pH: x  Gluc: 153 mg/dL / Ketone: x  / Bili: x / Urobili: x   Blood: x / Protein: x / Nitrite: x   Leuk Esterase: x / RBC: x / WBC x   Sq Epi: x / Non Sq Epi: x / Bacteria: x        RADIOLOGY & ADDITIONAL TESTS:

## 2024-05-03 NOTE — PROGRESS NOTE ADULT - SUBJECTIVE AND OBJECTIVE BOX
Cardiovascular Disease Progress Note  DATE OF SERVICE: 24 @ 11:03    Overnight events: No acute events overnight.  The patient is intubated and sedated.          Objective Findings:  T(C): 36.6 (24 @ 08:00), Max: 37.8 (24 @ 12:00)  HR: 79 (24 @ 10:10) (47 - 79)  BP: 170/67 (24 @ 10:10) (93/49 - 220/95)  RR: 18 (24 @ 10:10) (18 - 19)  SpO2: 100% (24 @ 10:10) (97% - 100%)  Wt(kg): --  Daily     Daily Weight in k (03 May 2024 04:00)      Physical Exam:  Gen: NAD; Patient resting comfortably  HEENT:  Normocephalic/ atraumatic  CV: RRR, normal S1 + S2, no m/r/g  Lungs:  Normal respiratory effort; Mechanical ventilation via ETT  Abd: soft, non-tender; bowel sounds present  Ext: No edema; warm and well perfused    Telemetry: Sinus     Laboratory Data:                        12.2   12.26 )-----------( 113      ( 03 May 2024 00:30 )             38.1         136  |  93<L>  |  51<H>  ----------------------------<  153<H>  5.8<H>   |  17<L>  |  7.72<H>    Ca    8.9      03 May 2024 00:30  Phos  6.6       Mg     2.60         TPro  7.0  /  Alb  4.0  /  TBili  0.8  /  DBili  x   /  AST  12  /  ALT  10  /  AlkPhos  82      PT/INR - ( 03 May 2024 00:30 )   PT: 13.1 sec;   INR: 1.16 ratio         PTT - ( 03 May 2024 00:30 )  PTT:26.1 sec          Inpatient Medications:  MEDICATIONS  (STANDING):  chlorhexidine 0.12% Liquid 15 milliLiter(s) Oral Mucosa every 12 hours  chlorhexidine 2% Cloths 1 Application(s) Topical <User Schedule>  dextrose 10% Bolus 125 milliLiter(s) IV Bolus once  dextrose 5%. 1000 milliLiter(s) (50 mL/Hr) IV Continuous <Continuous>  dextrose 5%. 1000 milliLiter(s) (100 mL/Hr) IV Continuous <Continuous>  dextrose 50% Injectable 12.5 Gram(s) IV Push once  dextrose 50% Injectable 50 milliLiter(s) IV Push once  dextrose 50% Injectable 25 Gram(s) IV Push once  epoetin reilly (EPOGEN) Injectable 75192 Unit(s) IV Push <User Schedule>  glucagon  Injectable 1 milliGRAM(s) IntraMuscular once  heparin   Injectable 5000 Unit(s) SubCutaneous every 12 hours  hydrALAZINE 25 milliGRAM(s) Oral three times a day  insulin lispro (ADMELOG) corrective regimen sliding scale   SubCutaneous every 6 hours  norepinephrine Infusion 0.1 MICROgram(s)/kG/Min (9.87 mL/Hr) IV Continuous <Continuous>  petrolatum Ophthalmic Ointment 1 Application(s) Both EYES two times a day  propofol Infusion 20 MICROgram(s)/kG/Min (6.31 mL/Hr) IV Continuous <Continuous>      Assessment: 73 year old man with HTN, HLD, T2DM on insulin, and ESRD on HD presents with supply demand ischemia and angina.    Plan of Care:    #Angina-  Noted on presentation.   Ischemic work up now postponed due to encephalopathy.   Encephalopathy work up as per Neuro and MICU    #Sinus bradycardia-  No evidence of AV block on admission EKG or telemetry.  No indication for pacing at this time.     #HTN-  BP labile.  Management as per MICU.     #ESRD-  HD as per renal.       Patient critically ill in the MICU        Over 44 minutes of critical care time spent on total encounter; more than 50% of the visit was spent counseling and/or coordinating care by the attending physician.      Geovani Bravo MD [facility]  Cardiovascular Disease  [phone]

## 2024-05-03 NOTE — PROGRESS NOTE ADULT - SUBJECTIVE AND OBJECTIVE BOX
Neurology Progress Note    S: Patient seen and examined.     Medication:  chlorhexidine 0.12% Liquid 15 milliLiter(s) Oral Mucosa every 12 hours  chlorhexidine 2% Cloths 1 Application(s) Topical <User Schedule>  dextrose 10% Bolus 125 milliLiter(s) IV Bolus once  dextrose 5%. 1000 milliLiter(s) IV Continuous <Continuous>  dextrose 5%. 1000 milliLiter(s) IV Continuous <Continuous>  dextrose 50% Injectable 25 Gram(s) IV Push once  dextrose 50% Injectable 50 milliLiter(s) IV Push once  dextrose 50% Injectable 12.5 Gram(s) IV Push once  dextrose Oral Gel 15 Gram(s) Oral once PRN  epoetin reilly (EPOGEN) Injectable 56978 Unit(s) IV Push <User Schedule>  glucagon  Injectable 1 milliGRAM(s) IntraMuscular once  heparin   Injectable 5000 Unit(s) SubCutaneous every 12 hours  hydrALAZINE 25 milliGRAM(s) Oral three times a day  insulin lispro (ADMELOG) corrective regimen sliding scale   SubCutaneous every 6 hours  norepinephrine Infusion 0.1 MICROgram(s)/kG/Min IV Continuous <Continuous>  petrolatum Ophthalmic Ointment 1 Application(s) Both EYES two times a day  propofol Infusion 20 MICROgram(s)/kG/Min IV Continuous <Continuous>      Vitals:  Vital Signs Last 24 Hrs  T(C): 36.2 (03 May 2024 12:00), Max: 37.4 (02 May 2024 20:00)  T(F): 97.2 (03 May 2024 12:00), Max: 99.3 (02 May 2024 20:00)  HR: 93 (03 May 2024 15:07) (56 - 93)  BP: 165/74 (03 May 2024 15:00) (117/56 - 220/95)  BP(mean): 96 (03 May 2024 15:00) (75 - 127)  RR: 19 (03 May 2024 15:00) (18 - 19)  SpO2: 100% (03 May 2024 15:07) (97% - 100%)    Parameters below as of 03 May 2024 04:00  Patient On (Oxygen Delivery Method): ventilator    O2 Concentration (%): 40    General Exam:   General Appearance: intubated, sedated     Head: Normocephalic, atraumatic and no dysmorphic features  Ear, Nose, and Throat: Moist mucous membranes  CVS: S1S2+  Resp: mechanical  Abd: soft NTND  Extremities: No edema, no cyanosis  Skin: No bruises, no rashes     Neurological Exam:  Mental Status: intubated, sedated Awake, alert and oriented x 3.  Able to follow simple and complex verbal commands. Able to name and repeat. fluent speech. No obvious aphasia or dysarthria noted.   Cranial Nerves: pupils 1-2mm, no facial asymmetry  Motor: dec tone througout, no spontaneous movement  Sensation: min Wd to noxious     I personally reviewed the below data/images/labs:      CBC Full  -  ( 03 May 2024 00:30 )  WBC Count : 12.26 K/uL  RBC Count : 6.01 M/uL  Hemoglobin : 12.2 g/dL  Hematocrit : 38.1 %  Platelet Count - Automated : 113 K/uL  Mean Cell Volume : 63.4 fL  Mean Cell Hemoglobin : 20.3 pg  Mean Cell Hemoglobin Concentration : 32.0 gm/dL  Auto Neutrophil # : 9.81 K/uL  Auto Lymphocyte # : 1.14 K/uL  Auto Monocyte # : 1.23 K/uL  Auto Eosinophil # : 0.00 K/uL  Auto Basophil # : 0.04 K/uL  Auto Neutrophil % : 80.1 %  Auto Lymphocyte % : 9.3 %  Auto Monocyte % : 10.0 %  Auto Eosinophil % : 0.0 %  Auto Basophil % : 0.3 %    05-03    136  |  93<L>  |  51<H>  ----------------------------<  153<H>  5.8<H>   |  17<L>  |  7.72<H>    Ca    8.9      03 May 2024 00:30  Phos  6.6     05-03  Mg     2.60     05-03    TPro  7.0  /  Alb  4.0  /  TBili  0.8  /  DBili  x   /  AST  12  /  ALT  10  /  AlkPhos  82  05-03    LIVER FUNCTIONS - ( 03 May 2024 00:30 )  Alb: 4.0 g/dL / Pro: 7.0 g/dL / ALK PHOS: 82 U/L / ALT: 10 U/L / AST: 12 U/L / GGT: x           PT/INR - ( 03 May 2024 00:30 )   PT: 13.1 sec;   INR: 1.16 ratio         PTT - ( 03 May 2024 00:30 )  PTT:26.1 sec  Urinalysis Basic - ( 03 May 2024 00:30 )    Color: x / Appearance: x / SG: x / pH: x  Gluc: 153 mg/dL / Ketone: x  / Bili: x / Urobili: x   Blood: x / Protein: x / Nitrite: x   Leuk Esterase: x / RBC: x / WBC x   Sq Epi: x / Non Sq Epi: x / Bacteria: x    CT Head No Cont:  (01 May 2024 18:11)  < from: CT Head No Cont (05.01.24 @ 18:11) >    ACC: [id number] EXAM:  CT BRAIN   ORDERED BY: SHELBY MOREL     PROCEDURE DATE:  05/01/2024          INTERPRETATION:  CT OF THE HEAD WITHOUT CONTRAST    CLINICAL INDICATION: Lethargy.    TECHNIQUE: Volumetric CT acquisition was performed through the brain and   reviewed using brain and bone window technique.      COMPARISON: CT head from 1/17/2024    FINDINGS:    The ventricular and sulcal size and configuration is age appropriate.     There is no acute loss of gray-white differentiation. Stable bilateral   inferior frontal encephalomalacia and gliosis likely related to remote   trauma.    There is no evidence of mass effect, midline shift, acute intracranial   hemorrhage, or extra-axial collections.     The calvarium is intact. The paranasalsinuses are clear.The mastoid air   cells are predominantly clear. The orbits are unremarkable.      IMPRESSION:  No acute intracranial hemorrhage or acute territorial infarction. Chronic   findings as above.    --- End of Report ---          GILDARDO KHAN; Resident Radiologist  This document has been electronically signed.  LADARIUS DENNY MD; Attending Radiologist  This document has been electronically signed. May  1 2024  7:54PM    < end of copied text >

## 2024-05-03 NOTE — PROGRESS NOTE ADULT - ASSESSMENT
73M w/ PMH of HTN, ESRD (MWF), IDDM, p/f chest pain c/b hiccups for the last 2 days undergoing ischemic evaluation per cardiology. Pt s/p RRT x 2 for AMS. MICU consulted and accepting for airway monitoring in setting of ? baclofen toxicity.     NEUROLOGY   #AMS  - patient with multiple RRTs for AMS  - CTH without acute findings   - may be due to baclofen toxicity vs withdrawal  - neurology w/ concerns for meningoencephalitis; plan for LP     PLAN  - follow up tox and neuro recs  - obtain MRI brain and vEEG  - cw HD for concern of baclofen toxicity       CARDIOVASCULAR     #Angina  - patient admitted with chest pain and noted to have peaked T waves on admission   - TTE showing EF 72%  - pending NST with Cards once improvement in clinical status     #HTN  - cw coreg, hydralazine, and nifedipine     RESPIRATORY   [facility]    GI/NUTRITION   #Gastroporesis   cw reglan     /RENAL   #ESRD MWF  - cw HD with R fem shiley  - pending fistula study of RUE (failed previous HD session)      INFECTIOUS DISEASE   #leukocytosis  - noted on repeat labs  - follow up infectious workup  - would monitor off antibiotics for now     ENDOCRINE   #IDD  -cw lantus 6U and admelog 2U  - cw ISS    HEMATOLOGY/ONCOLOGY  [facility]ELIX      DVT PPX  SQH     ETHICS  Full code 73M w/ PMH of HTN, ESRD (MWF), IDDM, p/f chest pain c/b hiccups for the last 2 days undergoing ischemic evaluation per cardiology. Pt s/p RRT x 2 for AMS. MICU consulted and accepting for airway monitoring in setting of ? baclofen toxicity.     NEUROLOGY   #AMS  - patient with multiple RRTs for AMS  - CTH without acute findings   - may be due to baclofen toxicity vs withdrawal  - neurology w/ concerns for meningoencephalitis; plan for LP     PLAN  - follow up tox and neuro recs  - obtain MRI brain and vEEG  - cw HD for concern of baclofen toxicity       CARDIOVASCULAR     #Angina  - patient admitted with chest pain and noted to have peaked T waves on admission   - TTE showing EF 72%  - pending NST with Cards once improvement in clinical status     #HTN  - cw hydralazine 25mg TID    RESPIRATORY   - intubated due to inability to protect airway    GI/NUTRITION   #Gastroporesis   - hold reglan for now     /RENAL   #ESRD MWF  - R fem shiley removed 5/2  - placement of IJ shiley 5/3  - pending fistula study of RUE (failed previous HD session)      INFECTIOUS DISEASE   #leukocytosis  - noted on repeat labs  - follow up infectious workup  - would monitor off antibiotics for now     ENDOCRINE   #IDD  - cw ISS    HEMATOLOGY/ONCOLOGY  FELIX      DVT PPX  SQH     ETHICS  Full code

## 2024-05-03 NOTE — PROGRESS NOTE ADULT - ASSESSMENT
72 y/o M PMhx HTN, ESRD (on HD M/W/F), DM who presented with hiccups x 2 days and chest pain found to have peaked T waves. He was evaluated by cardiology and was planned for nuclear stress test. He was started on reglan for possible gastroparesis. Hospital course c/b AMS s/p RRT on 5/1 and 5/2. Vascular consulted 2/2 RUE AVF access unable to be used s/p R femoral shiley placed for emergent HD. Transferred to MICU and intubated 5/2 for airway protection.     AMS, leukocytosis  afebrile  no evidence of SSTI on exam  admission CT abd/pelvis- Moderate gastric distention. No CT evidence of mechanical obstruction or SBO  CXR- clear, no focal consolidation  TTE- no evidence of valvular disease  s/p LP 5/2, cell count not consistent w/ bacterial meningitis, CSF PCR- negative  noted IJ shiley placed for HD access    Recommendations  monitor off antibiotics  f/u blood cultures- NGTD  trend temps, wbc    Steven Torres M.D.  [facility], Division of Infectious Diseases  [phone]  After 5pm on weekdays and all day on weekends - please call [phone]

## 2024-05-03 NOTE — PROGRESS NOTE ADULT - ASSESSMENT
73-year-old male past medical history hypertension, ESRD on dialysis Monday Wednesday Friday, diabetes insulin-dependent presents with hiccups patient endorses persistent hiccups for the last 2 days.   found to have peaked T waves, a new finding. denied dizziness, N/V, denies CP, palps, abd pain  Upon admission seen by CArd, renal and GI  found to have a distended GB prob 2/2 gastroparesis, was started on regaln  has received 4 doses of baclofen since 4/30 2/2 hiccups, last dose on 5/1 at 5 am  had received Haldol for agitation at 11 pm on 4/30, AMS observed in pm of 5/1  AMS ongoing, RRT x 2 called  now transferred to MICU for encephalopathy requiring airway protection airway protection on 5/2,  intubated for airway protection, on propofol and pressors.   HD was not done timely until femoral shiley was placed 2/2 clotted RUE AVF. now removed and RIJ shiley placed on 5/3  has been nonverbal since pm 5/1.     encephalopathy etiology not clear.   remains intubated for airway protection, sedated. on pressors, ID, Neuro following. being observed off Abx. esrd, had missed HD prior to AMS 2/2 clotted RUE AVF. fistulogram when medically stable. at present HD via RIght IJ Shiley. NGT in place in stomach. LP done, no sign of meningitis.         NEUROLOGY     - CTH without acute findings   - LP done, no acute findings  - awaiting  MRI brain and vEEG    CARDIOVASCULAR     - ptn never had CP. just peaked T waves. was awaiting ischemic study w nucl stress test  - TTE showing EF 72%  - pending NST with Cards once improvement in clinical status   - on pressors    GI  - on NGT feeds while sedated, intubated  - Gastroparesis       RENAL   - ESRD MWF  - cw HD with R IJ shiley. R fem removed  - pending fistula study of RUE      INFECTIOUS DISEASE   - new leukocytosis, r/o sepsis  - pan scan  of C/A/P on admission was neg  - check blood cx  - observe off ABx    ENDOCRINE   - DM  -cw lantus 6U and admelog 2U  - cw ISS    DVT ppx w [facility]  GOC:  Full code

## 2024-05-03 NOTE — PROGRESS NOTE ADULT - ASSESSMENT
73M w/ PMHx hypertension, ESRD on HD via R radiocephalic fistula (MWF), DM, HTN, p/w hiccups and peaked T waves. Patient received 1x HD on admission. Pt failed HD today and had 2 RRT for AMS. Vascular consulted for shiley placement for emergent HD.    Recommendations:   - Place IJ shiley for temporary dialysis access  - Plan for RUE fistulogram when medically optimized   - Global care per primary     Vascular Surgery  [id number]

## 2024-05-03 NOTE — CHART NOTE - NSCHARTNOTEFT_GEN_A_CORE
Pt w/ worsening hypotension and increasing pressor requirements. EKG performed and significant for ST elevations in V1, V2 w/ T wave changes. Discussed findings w/ Dr. Bravo (Cardiology). EKG changes likely 2/2 repolarization from possible ischemic event. Recommended to start ACS treatment w/ Aspirin 324, Plavix 300, Heparin gtt. Will begin ACS protocol and trend CE.     Elsi Burger MD   Internal Medicine, PGY-2

## 2024-05-03 NOTE — PROVIDER CONTACT NOTE (OTHER) - ACTION/TREATMENT ORDERED:
Nephrology ordered No hemodialysis today  for this patient, discussed CRRT with MICU team. Orders verified and read back.

## 2024-05-04 ENCOUNTER — RESULT REVIEW (OUTPATIENT)
Age: 74
End: 2024-05-04

## 2024-05-04 LAB
ALBUMIN SERPL ELPH-MCNC: 3.4 G/DL — SIGNIFICANT CHANGE UP (ref 3.3–5)
ALBUMIN SERPL ELPH-MCNC: 3.5 G/DL — SIGNIFICANT CHANGE UP (ref 3.3–5)
ALBUMIN SERPL ELPH-MCNC: 3.5 G/DL — SIGNIFICANT CHANGE UP (ref 3.3–5)
ALBUMIN SERPL ELPH-MCNC: 3.7 G/DL — SIGNIFICANT CHANGE UP (ref 3.3–5)
ALBUMIN SERPL ELPH-MCNC: 3.7 G/DL — SIGNIFICANT CHANGE UP (ref 3.3–5)
ALP SERPL-CCNC: 73 U/L — SIGNIFICANT CHANGE UP (ref 40–120)
ALP SERPL-CCNC: 74 U/L — SIGNIFICANT CHANGE UP (ref 40–120)
ALP SERPL-CCNC: 76 U/L — SIGNIFICANT CHANGE UP (ref 40–120)
ALP SERPL-CCNC: 77 U/L — SIGNIFICANT CHANGE UP (ref 40–120)
ALP SERPL-CCNC: 79 U/L — SIGNIFICANT CHANGE UP (ref 40–120)
ALT FLD-CCNC: 22 U/L — SIGNIFICANT CHANGE UP (ref 4–41)
ALT FLD-CCNC: 24 U/L — SIGNIFICANT CHANGE UP (ref 4–41)
ALT FLD-CCNC: 25 U/L — SIGNIFICANT CHANGE UP (ref 4–41)
ALT FLD-CCNC: 25 U/L — SIGNIFICANT CHANGE UP (ref 4–41)
ALT FLD-CCNC: 26 U/L — SIGNIFICANT CHANGE UP (ref 4–41)
ANION GAP SERPL CALC-SCNC: 17 MMOL/L — HIGH (ref 7–14)
ANION GAP SERPL CALC-SCNC: 19 MMOL/L — HIGH (ref 7–14)
ANION GAP SERPL CALC-SCNC: 20 MMOL/L — HIGH (ref 7–14)
ANION GAP SERPL CALC-SCNC: 20 MMOL/L — HIGH (ref 7–14)
ANION GAP SERPL CALC-SCNC: 22 MMOL/L — HIGH (ref 7–14)
APTT BLD: 48.8 SEC — HIGH (ref 24.5–35.6)
APTT BLD: 60.6 SEC — HIGH (ref 24.5–35.6)
APTT BLD: 69.5 SEC — HIGH (ref 24.5–35.6)
AST SERPL-CCNC: 22 U/L — SIGNIFICANT CHANGE UP (ref 4–40)
AST SERPL-CCNC: 23 U/L — SIGNIFICANT CHANGE UP (ref 4–40)
AST SERPL-CCNC: 25 U/L — SIGNIFICANT CHANGE UP (ref 4–40)
AST SERPL-CCNC: 26 U/L — SIGNIFICANT CHANGE UP (ref 4–40)
AST SERPL-CCNC: 26 U/L — SIGNIFICANT CHANGE UP (ref 4–40)
BASOPHILS # BLD AUTO: 0 K/UL — SIGNIFICANT CHANGE UP (ref 0–0.2)
BASOPHILS # BLD AUTO: 0.04 K/UL — SIGNIFICANT CHANGE UP (ref 0–0.2)
BASOPHILS # BLD AUTO: 0.04 K/UL — SIGNIFICANT CHANGE UP (ref 0–0.2)
BASOPHILS # BLD AUTO: 0.05 K/UL — SIGNIFICANT CHANGE UP (ref 0–0.2)
BASOPHILS # BLD AUTO: 0.06 K/UL — SIGNIFICANT CHANGE UP (ref 0–0.2)
BASOPHILS NFR BLD AUTO: 0 % — SIGNIFICANT CHANGE UP (ref 0–2)
BASOPHILS NFR BLD AUTO: 0.2 % — SIGNIFICANT CHANGE UP (ref 0–2)
BILIRUB SERPL-MCNC: 0.8 MG/DL — SIGNIFICANT CHANGE UP (ref 0.2–1.2)
BILIRUB SERPL-MCNC: 0.8 MG/DL — SIGNIFICANT CHANGE UP (ref 0.2–1.2)
BILIRUB SERPL-MCNC: 0.9 MG/DL — SIGNIFICANT CHANGE UP (ref 0.2–1.2)
BILIRUB SERPL-MCNC: 0.9 MG/DL — SIGNIFICANT CHANGE UP (ref 0.2–1.2)
BILIRUB SERPL-MCNC: 1 MG/DL — SIGNIFICANT CHANGE UP (ref 0.2–1.2)
BLD GP AB SCN SERPL QL: NEGATIVE — SIGNIFICANT CHANGE UP
BLOOD GAS ARTERIAL COMPREHENSIVE RESULT: SIGNIFICANT CHANGE UP
BUN SERPL-MCNC: 32 MG/DL — HIGH (ref 7–23)
BUN SERPL-MCNC: 36 MG/DL — HIGH (ref 7–23)
BUN SERPL-MCNC: 40 MG/DL — HIGH (ref 7–23)
BUN SERPL-MCNC: 50 MG/DL — HIGH (ref 7–23)
BUN SERPL-MCNC: 57 MG/DL — HIGH (ref 7–23)
CALCIUM SERPL-MCNC: 8.4 MG/DL — SIGNIFICANT CHANGE UP (ref 8.4–10.5)
CALCIUM SERPL-MCNC: 8.6 MG/DL — SIGNIFICANT CHANGE UP (ref 8.4–10.5)
CALCIUM SERPL-MCNC: 8.7 MG/DL — SIGNIFICANT CHANGE UP (ref 8.4–10.5)
CHLORIDE SERPL-SCNC: 95 MMOL/L — LOW (ref 98–107)
CHLORIDE SERPL-SCNC: 96 MMOL/L — LOW (ref 98–107)
CHLORIDE SERPL-SCNC: 98 MMOL/L — SIGNIFICANT CHANGE UP (ref 98–107)
CK SERPL-CCNC: 155 U/L — SIGNIFICANT CHANGE UP (ref 30–200)
CO2 SERPL-SCNC: 17 MMOL/L — LOW (ref 22–31)
CO2 SERPL-SCNC: 18 MMOL/L — LOW (ref 22–31)
CO2 SERPL-SCNC: 18 MMOL/L — LOW (ref 22–31)
CO2 SERPL-SCNC: 19 MMOL/L — LOW (ref 22–31)
CO2 SERPL-SCNC: 19 MMOL/L — LOW (ref 22–31)
CREAT SERPL-MCNC: 3.47 MG/DL — HIGH (ref 0.5–1.3)
CREAT SERPL-MCNC: 4.38 MG/DL — HIGH (ref 0.5–1.3)
CREAT SERPL-MCNC: 5.3 MG/DL — HIGH (ref 0.5–1.3)
CREAT SERPL-MCNC: 6.47 MG/DL — HIGH (ref 0.5–1.3)
CREAT SERPL-MCNC: 7.57 MG/DL — HIGH (ref 0.5–1.3)
EBV PCR: SIGNIFICANT CHANGE UP IU/ML
EGFR: 11 ML/MIN/1.73M2 — LOW
EGFR: 14 ML/MIN/1.73M2 — LOW
EGFR: 18 ML/MIN/1.73M2 — LOW
EGFR: 7 ML/MIN/1.73M2 — LOW
EGFR: 9 ML/MIN/1.73M2 — LOW
EOSINOPHIL # BLD AUTO: 0 K/UL — SIGNIFICANT CHANGE UP (ref 0–0.5)
EOSINOPHIL # BLD AUTO: 0.01 K/UL — SIGNIFICANT CHANGE UP (ref 0–0.5)
EOSINOPHIL # BLD AUTO: 0.01 K/UL — SIGNIFICANT CHANGE UP (ref 0–0.5)
EOSINOPHIL NFR BLD AUTO: 0 % — SIGNIFICANT CHANGE UP (ref 0–6)
GLUCOSE BLDC GLUCOMTR-MCNC: 127 MG/DL — HIGH (ref 70–99)
GLUCOSE BLDC GLUCOMTR-MCNC: 146 MG/DL — HIGH (ref 70–99)
GLUCOSE BLDC GLUCOMTR-MCNC: 162 MG/DL — HIGH (ref 70–99)
GLUCOSE BLDC GLUCOMTR-MCNC: 174 MG/DL — HIGH (ref 70–99)
GLUCOSE BLDC GLUCOMTR-MCNC: 219 MG/DL — HIGH (ref 70–99)
GLUCOSE SERPL-MCNC: 169 MG/DL — HIGH (ref 70–99)
GLUCOSE SERPL-MCNC: 185 MG/DL — HIGH (ref 70–99)
GLUCOSE SERPL-MCNC: 191 MG/DL — HIGH (ref 70–99)
GLUCOSE SERPL-MCNC: 229 MG/DL — HIGH (ref 70–99)
GLUCOSE SERPL-MCNC: 245 MG/DL — HIGH (ref 70–99)
GRAM STN FLD: ABNORMAL
HCT VFR BLD CALC: 33.8 % — LOW (ref 39–50)
HCT VFR BLD CALC: 34.9 % — LOW (ref 39–50)
HCT VFR BLD CALC: 35.2 % — LOW (ref 39–50)
HCT VFR BLD CALC: 35.9 % — LOW (ref 39–50)
HCT VFR BLD CALC: 36.9 % — LOW (ref 39–50)
HGB BLD-MCNC: 11 G/DL — LOW (ref 13–17)
HGB BLD-MCNC: 11.1 G/DL — LOW (ref 13–17)
HGB BLD-MCNC: 11.7 G/DL — LOW (ref 13–17)
HGB BLD-MCNC: 11.7 G/DL — LOW (ref 13–17)
HGB BLD-MCNC: 11.9 G/DL — LOW (ref 13–17)
IANC: 19.45 K/UL — HIGH (ref 1.8–7.4)
IANC: 20.29 K/UL — HIGH (ref 1.8–7.4)
IANC: 21.8 K/UL — HIGH (ref 1.8–7.4)
IANC: 22.03 K/UL — HIGH (ref 1.8–7.4)
IANC: 22.73 K/UL — HIGH (ref 1.8–7.4)
IMM GRANULOCYTES NFR BLD AUTO: 0.7 % — SIGNIFICANT CHANGE UP (ref 0–0.9)
IMM GRANULOCYTES NFR BLD AUTO: 1 % — HIGH (ref 0–0.9)
IMM GRANULOCYTES NFR BLD AUTO: 1 % — HIGH (ref 0–0.9)
IMM GRANULOCYTES NFR BLD AUTO: 1.4 % — HIGH (ref 0–0.9)
INR BLD: 1.24 RATIO — HIGH (ref 0.85–1.18)
INR BLD: 1.28 RATIO — HIGH (ref 0.85–1.18)
LYMPHOCYTES # BLD AUTO: 0.74 K/UL — LOW (ref 1–3.3)
LYMPHOCYTES # BLD AUTO: 0.82 K/UL — LOW (ref 1–3.3)
LYMPHOCYTES # BLD AUTO: 0.84 K/UL — LOW (ref 1–3.3)
LYMPHOCYTES # BLD AUTO: 0.96 K/UL — LOW (ref 1–3.3)
LYMPHOCYTES # BLD AUTO: 1.2 K/UL — SIGNIFICANT CHANGE UP (ref 1–3.3)
LYMPHOCYTES # BLD AUTO: 2.9 % — LOW (ref 13–44)
LYMPHOCYTES # BLD AUTO: 3.3 % — LOW (ref 13–44)
LYMPHOCYTES # BLD AUTO: 3.6 % — LOW (ref 13–44)
LYMPHOCYTES # BLD AUTO: 3.9 % — LOW (ref 13–44)
LYMPHOCYTES # BLD AUTO: 5.2 % — LOW (ref 13–44)
MAGNESIUM SERPL-MCNC: 2.2 MG/DL — SIGNIFICANT CHANGE UP (ref 1.6–2.6)
MAGNESIUM SERPL-MCNC: 2.2 MG/DL — SIGNIFICANT CHANGE UP (ref 1.6–2.6)
MAGNESIUM SERPL-MCNC: 2.3 MG/DL — SIGNIFICANT CHANGE UP (ref 1.6–2.6)
MAGNESIUM SERPL-MCNC: 2.3 MG/DL — SIGNIFICANT CHANGE UP (ref 1.6–2.6)
MAGNESIUM SERPL-MCNC: 2.4 MG/DL — SIGNIFICANT CHANGE UP (ref 1.6–2.6)
MCHC RBC-ENTMCNC: 20.1 PG — LOW (ref 27–34)
MCHC RBC-ENTMCNC: 20.3 PG — LOW (ref 27–34)
MCHC RBC-ENTMCNC: 20.4 PG — LOW (ref 27–34)
MCHC RBC-ENTMCNC: 20.6 PG — LOW (ref 27–34)
MCHC RBC-ENTMCNC: 20.7 PG — LOW (ref 27–34)
MCHC RBC-ENTMCNC: 31.8 GM/DL — LOW (ref 32–36)
MCHC RBC-ENTMCNC: 32.2 GM/DL — SIGNIFICANT CHANGE UP (ref 32–36)
MCHC RBC-ENTMCNC: 32.5 GM/DL — SIGNIFICANT CHANGE UP (ref 32–36)
MCHC RBC-ENTMCNC: 32.6 GM/DL — SIGNIFICANT CHANGE UP (ref 32–36)
MCHC RBC-ENTMCNC: 33.2 GM/DL — SIGNIFICANT CHANGE UP (ref 32–36)
MCV RBC AUTO: 62.2 FL — LOW (ref 80–100)
MCV RBC AUTO: 62.5 FL — LOW (ref 80–100)
MCV RBC AUTO: 62.9 FL — LOW (ref 80–100)
MCV RBC AUTO: 63.2 FL — LOW (ref 80–100)
MCV RBC AUTO: 63.3 FL — LOW (ref 80–100)
MONOCYTES # BLD AUTO: 1.63 K/UL — HIGH (ref 0–0.9)
MONOCYTES # BLD AUTO: 1.76 K/UL — HIGH (ref 0–0.9)
MONOCYTES # BLD AUTO: 1.8 K/UL — HIGH (ref 0–0.9)
MONOCYTES # BLD AUTO: 2.07 K/UL — HIGH (ref 0–0.9)
MONOCYTES # BLD AUTO: 2.1 K/UL — HIGH (ref 0–0.9)
MONOCYTES NFR BLD AUTO: 6.4 % — SIGNIFICANT CHANGE UP (ref 2–14)
MONOCYTES NFR BLD AUTO: 7.1 % — SIGNIFICANT CHANGE UP (ref 2–14)
MONOCYTES NFR BLD AUTO: 7.8 % — SIGNIFICANT CHANGE UP (ref 2–14)
MONOCYTES NFR BLD AUTO: 8.2 % — SIGNIFICANT CHANGE UP (ref 2–14)
MONOCYTES NFR BLD AUTO: 9.3 % — SIGNIFICANT CHANGE UP (ref 2–14)
NEUTROPHILS # BLD AUTO: 19.45 K/UL — HIGH (ref 1.8–7.4)
NEUTROPHILS # BLD AUTO: 19.45 K/UL — HIGH (ref 1.8–7.4)
NEUTROPHILS # BLD AUTO: 21.8 K/UL — HIGH (ref 1.8–7.4)
NEUTROPHILS # BLD AUTO: 22.03 K/UL — HIGH (ref 1.8–7.4)
NEUTROPHILS # BLD AUTO: 22.73 K/UL — HIGH (ref 1.8–7.4)
NEUTROPHILS NFR BLD AUTO: 68.7 % — SIGNIFICANT CHANGE UP (ref 43–77)
NEUTROPHILS NFR BLD AUTO: 85.9 % — HIGH (ref 43–77)
NEUTROPHILS NFR BLD AUTO: 86.9 % — HIGH (ref 43–77)
NEUTROPHILS NFR BLD AUTO: 88.1 % — HIGH (ref 43–77)
NEUTROPHILS NFR BLD AUTO: 89.5 % — HIGH (ref 43–77)
NRBC # BLD: 0 /100 WBCS — SIGNIFICANT CHANGE UP (ref 0–0)
NRBC # BLD: 1 /100 WBCS — HIGH (ref 0–0)
NRBC # FLD: 0.17 K/UL — HIGH (ref 0–0)
NRBC # FLD: 0.18 K/UL — HIGH (ref 0–0)
NRBC # FLD: 0.23 K/UL — HIGH (ref 0–0)
NRBC # FLD: 0.28 K/UL — HIGH (ref 0–0)
PHOSPHATE SERPL-MCNC: 5.4 MG/DL — HIGH (ref 2.5–4.5)
PHOSPHATE SERPL-MCNC: 5.5 MG/DL — HIGH (ref 2.5–4.5)
PHOSPHATE SERPL-MCNC: 6.1 MG/DL — HIGH (ref 2.5–4.5)
PHOSPHATE SERPL-MCNC: 6.2 MG/DL — HIGH (ref 2.5–4.5)
PHOSPHATE SERPL-MCNC: 6.8 MG/DL — HIGH (ref 2.5–4.5)
PLATELET # BLD AUTO: 106 K/UL — LOW (ref 150–400)
PLATELET # BLD AUTO: 107 K/UL — LOW (ref 150–400)
PLATELET # BLD AUTO: 112 K/UL — LOW (ref 150–400)
PLATELET # BLD AUTO: 113 K/UL — LOW (ref 150–400)
PLATELET # BLD AUTO: 114 K/UL — LOW (ref 150–400)
POTASSIUM SERPL-MCNC: 4.6 MMOL/L — SIGNIFICANT CHANGE UP (ref 3.5–5.3)
POTASSIUM SERPL-MCNC: 5.2 MMOL/L — SIGNIFICANT CHANGE UP (ref 3.5–5.3)
POTASSIUM SERPL-MCNC: 5.3 MMOL/L — SIGNIFICANT CHANGE UP (ref 3.5–5.3)
POTASSIUM SERPL-MCNC: 5.3 MMOL/L — SIGNIFICANT CHANGE UP (ref 3.5–5.3)
POTASSIUM SERPL-MCNC: 5.6 MMOL/L — HIGH (ref 3.5–5.3)
POTASSIUM SERPL-SCNC: 4.6 MMOL/L — SIGNIFICANT CHANGE UP (ref 3.5–5.3)
POTASSIUM SERPL-SCNC: 5.2 MMOL/L — SIGNIFICANT CHANGE UP (ref 3.5–5.3)
POTASSIUM SERPL-SCNC: 5.3 MMOL/L — SIGNIFICANT CHANGE UP (ref 3.5–5.3)
POTASSIUM SERPL-SCNC: 5.3 MMOL/L — SIGNIFICANT CHANGE UP (ref 3.5–5.3)
POTASSIUM SERPL-SCNC: 5.6 MMOL/L — HIGH (ref 3.5–5.3)
PROT SERPL-MCNC: 6.6 G/DL — SIGNIFICANT CHANGE UP (ref 6–8.3)
PROT SERPL-MCNC: 6.9 G/DL — SIGNIFICANT CHANGE UP (ref 6–8.3)
PROT SERPL-MCNC: 7 G/DL — SIGNIFICANT CHANGE UP (ref 6–8.3)
PROTHROM AB SERPL-ACNC: 13.9 SEC — HIGH (ref 9.5–13)
PROTHROM AB SERPL-ACNC: 14.2 SEC — HIGH (ref 9.5–13)
RBC # BLD: 5.34 M/UL — SIGNIFICANT CHANGE UP (ref 4.2–5.8)
RBC # BLD: 5.52 M/UL — SIGNIFICANT CHANGE UP (ref 4.2–5.8)
RBC # BLD: 5.66 M/UL — SIGNIFICANT CHANGE UP (ref 4.2–5.8)
RBC # BLD: 5.74 M/UL — SIGNIFICANT CHANGE UP (ref 4.2–5.8)
RBC # BLD: 5.87 M/UL — HIGH (ref 4.2–5.8)
RBC # FLD: 20.8 % — HIGH (ref 10.3–14.5)
RBC # FLD: 20.8 % — HIGH (ref 10.3–14.5)
RBC # FLD: 21.3 % — HIGH (ref 10.3–14.5)
RBC # FLD: 22.2 % — HIGH (ref 10.3–14.5)
RBC # FLD: 22.3 % — HIGH (ref 10.3–14.5)
RH IG SCN BLD-IMP: POSITIVE — SIGNIFICANT CHANGE UP
SODIUM SERPL-SCNC: 133 MMOL/L — LOW (ref 135–145)
SODIUM SERPL-SCNC: 133 MMOL/L — LOW (ref 135–145)
SODIUM SERPL-SCNC: 134 MMOL/L — LOW (ref 135–145)
SPECIMEN SOURCE: SIGNIFICANT CHANGE UP
TROPONIN T, HIGH SENSITIVITY RESULT: 145 NG/L — CRITICAL HIGH
WBC # BLD: 22.65 K/UL — HIGH (ref 3.8–10.5)
WBC # BLD: 23.05 K/UL — HIGH (ref 3.8–10.5)
WBC # BLD: 24.74 K/UL — HIGH (ref 3.8–10.5)
WBC # BLD: 25.36 K/UL — HIGH (ref 3.8–10.5)
WBC # BLD: 25.4 K/UL — HIGH (ref 3.8–10.5)
WBC # FLD AUTO: 22.65 K/UL — HIGH (ref 3.8–10.5)
WBC # FLD AUTO: 23.05 K/UL — HIGH (ref 3.8–10.5)
WBC # FLD AUTO: 24.74 K/UL — HIGH (ref 3.8–10.5)
WBC # FLD AUTO: 25.36 K/UL — HIGH (ref 3.8–10.5)
WBC # FLD AUTO: 25.4 K/UL — HIGH (ref 3.8–10.5)

## 2024-05-04 PROCEDURE — 99291 CRITICAL CARE FIRST HOUR: CPT

## 2024-05-04 PROCEDURE — 93308 TTE F-UP OR LMTD: CPT | Mod: 26,GC

## 2024-05-04 PROCEDURE — 95720 EEG PHY/QHP EA INCR W/VEEG: CPT

## 2024-05-04 RX ORDER — FENTANYL CITRATE 50 UG/ML
2 INJECTION INTRAVENOUS
Qty: 2500 | Refills: 0 | Status: DISCONTINUED | OUTPATIENT
Start: 2024-05-04 | End: 2024-05-04

## 2024-05-04 RX ORDER — FENTANYL CITRATE 50 UG/ML
1 INJECTION INTRAVENOUS
Qty: 2500 | Refills: 0 | Status: DISCONTINUED | OUTPATIENT
Start: 2024-05-04 | End: 2024-05-05

## 2024-05-04 RX ORDER — LEVETIRACETAM 250 MG/1
1500 TABLET, FILM COATED ORAL ONCE
Refills: 0 | Status: DISCONTINUED | OUTPATIENT
Start: 2024-05-04 | End: 2024-05-04

## 2024-05-04 RX ORDER — ASPIRIN/CALCIUM CARB/MAGNESIUM 324 MG
81 TABLET ORAL DAILY
Refills: 0 | Status: DISCONTINUED | OUTPATIENT
Start: 2024-05-04 | End: 2024-05-27

## 2024-05-04 RX ORDER — FENTANYL CITRATE 50 UG/ML
100 INJECTION INTRAVENOUS ONCE
Refills: 0 | Status: DISCONTINUED | OUTPATIENT
Start: 2024-05-04 | End: 2024-05-04

## 2024-05-04 RX ORDER — ATROPINE SULFATE 0.1 MG/ML
1 SYRINGE (ML) INJECTION ONCE
Refills: 0 | Status: COMPLETED | OUTPATIENT
Start: 2024-05-04 | End: 2024-05-04

## 2024-05-04 RX ORDER — LEVETIRACETAM 250 MG/1
1500 TABLET, FILM COATED ORAL ONCE
Refills: 0 | Status: COMPLETED | OUTPATIENT
Start: 2024-05-04 | End: 2024-05-04

## 2024-05-04 RX ORDER — LEVETIRACETAM 250 MG/1
1000 TABLET, FILM COATED ORAL EVERY 12 HOURS
Refills: 0 | Status: DISCONTINUED | OUTPATIENT
Start: 2024-05-04 | End: 2024-05-06

## 2024-05-04 RX ORDER — LEVETIRACETAM 250 MG/1
500 TABLET, FILM COATED ORAL EVERY 12 HOURS
Refills: 0 | Status: DISCONTINUED | OUTPATIENT
Start: 2024-05-04 | End: 2024-05-04

## 2024-05-04 RX ORDER — LEVETIRACETAM 250 MG/1
1000 TABLET, FILM COATED ORAL EVERY 12 HOURS
Refills: 0 | Status: DISCONTINUED | OUTPATIENT
Start: 2024-05-04 | End: 2024-05-04

## 2024-05-04 RX ADMIN — PIPERACILLIN AND TAZOBACTAM 25 GRAM(S): 4; .5 INJECTION, POWDER, LYOPHILIZED, FOR SOLUTION INTRAVENOUS at 01:42

## 2024-05-04 RX ADMIN — Medication 1 APPLICATION(S): at 05:35

## 2024-05-04 RX ADMIN — PIPERACILLIN AND TAZOBACTAM 25 GRAM(S): 4; .5 INJECTION, POWDER, LYOPHILIZED, FOR SOLUTION INTRAVENOUS at 11:31

## 2024-05-04 RX ADMIN — FENTANYL CITRATE 1.05 MICROGRAM(S)/KG/HR: 50 INJECTION INTRAVENOUS at 11:48

## 2024-05-04 RX ADMIN — Medication 1 APPLICATION(S): at 17:44

## 2024-05-04 RX ADMIN — LEVETIRACETAM 400 MILLIGRAM(S): 250 TABLET, FILM COATED ORAL at 21:05

## 2024-05-04 RX ADMIN — HEPARIN SODIUM 650 UNIT(S)/HR: 5000 INJECTION INTRAVENOUS; SUBCUTANEOUS at 04:37

## 2024-05-04 RX ADMIN — Medication 1: at 17:52

## 2024-05-04 RX ADMIN — CHLORHEXIDINE GLUCONATE 15 MILLILITER(S): 213 SOLUTION TOPICAL at 17:44

## 2024-05-04 RX ADMIN — CHLORHEXIDINE GLUCONATE 15 MILLILITER(S): 213 SOLUTION TOPICAL at 05:34

## 2024-05-04 RX ADMIN — PIPERACILLIN AND TAZOBACTAM 25 GRAM(S): 4; .5 INJECTION, POWDER, LYOPHILIZED, FOR SOLUTION INTRAVENOUS at 23:55

## 2024-05-04 RX ADMIN — LEVETIRACETAM 400 MILLIGRAM(S): 250 TABLET, FILM COATED ORAL at 16:47

## 2024-05-04 RX ADMIN — FENTANYL CITRATE 100 MICROGRAM(S): 50 INJECTION INTRAVENOUS at 04:58

## 2024-05-04 RX ADMIN — VASOPRESSIN 4.5 UNIT(S)/MIN: 20 INJECTION INTRAVENOUS at 19:20

## 2024-05-04 RX ADMIN — HEPARIN SODIUM 650 UNIT(S)/HR: 5000 INJECTION INTRAVENOUS; SUBCUTANEOUS at 11:47

## 2024-05-04 RX ADMIN — Medication 1: at 23:57

## 2024-05-04 RX ADMIN — Medication 2.47 MICROGRAM(S)/KG/MIN: at 19:20

## 2024-05-04 RX ADMIN — Medication 2: at 05:35

## 2024-05-04 RX ADMIN — Medication 1 MILLIGRAM(S): at 04:20

## 2024-05-04 RX ADMIN — CHLORHEXIDINE GLUCONATE 1 APPLICATION(S): 213 SOLUTION TOPICAL at 05:35

## 2024-05-04 RX ADMIN — FENTANYL CITRATE 1.05 MICROGRAM(S)/KG/HR: 50 INJECTION INTRAVENOUS at 19:20

## 2024-05-04 RX ADMIN — VASOPRESSIN 4.5 UNIT(S)/MIN: 20 INJECTION INTRAVENOUS at 11:48

## 2024-05-04 RX ADMIN — Medication 2.47 MICROGRAM(S)/KG/MIN: at 11:48

## 2024-05-04 RX ADMIN — FENTANYL CITRATE 1.05 MICROGRAM(S)/KG/HR: 50 INJECTION INTRAVENOUS at 05:34

## 2024-05-04 NOTE — PROGRESS NOTE ADULT - SUBJECTIVE AND OBJECTIVE BOX
Patient is a 73y old  Male who presents with a chief complaint of Unstable angina, abn EKG (04 May 2024 14:57)      SUBJECTIVE / OVERNIGHT EVENTS: remains intubated, sedated, on pressors, on CRRT, septic on ZOsyn, EKG changes on 5/3, loaded w DAPT now on Heparin drip    MEDICATIONS  (STANDING):  chlorhexidine 0.12% Liquid 15 milliLiter(s) Oral Mucosa every 12 hours  chlorhexidine 2% Cloths 1 Application(s) Topical <User Schedule>  CRRT Treatment    <Continuous>  dextrose 10% Bolus 125 milliLiter(s) IV Bolus once  dextrose 5%. 1000 milliLiter(s) (50 mL/Hr) IV Continuous <Continuous>  dextrose 5%. 1000 milliLiter(s) (100 mL/Hr) IV Continuous <Continuous>  dextrose 50% Injectable 12.5 Gram(s) IV Push once  dextrose 50% Injectable 25 Gram(s) IV Push once  epoetin reilly (EPOGEN) Injectable 41538 Unit(s) IV Push <User Schedule>  fentaNYL   Infusion..... 1 MICROgram(s)/kG/Hr (1.05 mL/Hr) IV Continuous <Continuous>  glucagon  Injectable 1 milliGRAM(s) IntraMuscular once  heparin  Infusion.  Unit(s)/Hr (6.5 mL/Hr) IV Continuous <Continuous>  insulin lispro (ADMELOG) corrective regimen sliding scale   SubCutaneous every 6 hours  norepinephrine Infusion 0.05 MICROgram(s)/kG/Min (2.47 mL/Hr) IV Continuous <Continuous>  petrolatum Ophthalmic Ointment 1 Application(s) Both EYES two times a day  piperacillin/tazobactam IVPB.. 3.375 Gram(s) IV Intermittent every 12 hours  PrismaSATE Dialysate BGK 4 / 2.5 5000 milliLiter(s) (1000 mL/Hr) CRRT <Continuous>  PrismaSOL Filtration BGK 4 / 2.5 5000 milliLiter(s) (200 mL/Hr) CRRT <Continuous>  PrismaSOL Filtration BGK 4 / 2.5 5000 milliLiter(s) (800 mL/Hr) CRRT <Continuous>  propofol Infusion 20 MICROgram(s)/kG/Min (6.31 mL/Hr) IV Continuous <Continuous>  vasopressin Infusion 0.03 Unit(s)/Min (4.5 mL/Hr) IV Continuous <Continuous>    MEDICATIONS  (PRN):  dextrose Oral Gel 15 Gram(s) Oral once PRN Blood Glucose LESS THAN 70 milliGRAM(s)/deciliter      Vital Signs Last 24 Hrs  T(F): 100.1 (05-04-24 @ 15:00), Max: 103 (05-03-24 @ 16:30)  HR: 44 (05-04-24 @ 15:00) (35 - 103)  BP: 144/63 (05-03-24 @ 22:15) (80/48 - 217/56)  RR: 14 (05-04-24 @ 15:00) (7 - 22)  SpO2: 100% (05-04-24 @ 15:00) (93% - 100%)  Telemetry:   CAPILLARY BLOOD GLUCOSE      POCT Blood Glucose.: 146 mg/dL (04 May 2024 11:31)  POCT Blood Glucose.: 219 mg/dL (04 May 2024 05:07)  POCT Blood Glucose.: 239 mg/dL (03 May 2024 23:22)  POCT Blood Glucose.: 240 mg/dL (03 May 2024 18:30)    I&O's Summary    03 May 2024 07:01  -  04 May 2024 07:00  --------------------------------------------------------  IN: 991.6 mL / OUT: 607 mL / NET: 384.6 mL    04 May 2024 07:01  -  04 May 2024 16:22  --------------------------------------------------------  IN: 209.2 mL / OUT: 22 mL / NET: 187.2 mL        PHYSICAL EXAM:  GENERAL: NAD, well-developed  HEAD:  Atraumatic, Normocephalic  EYES: EOMI, PERRLA, conjunctiva and sclera clear  NECK: Supple, No JVD  CHEST/LUNG: Clear to auscultation bilaterally; No wheeze  HEART: Regular rate and rhythm; No murmurs, rubs, or gallops  ABDOMEN: Soft, Nontender, Nondistended; Bowel sounds present  EXTREMITIES:  2+ Peripheral Pulses, No clubbing, cyanosis, or edema  PSYCH: AAOx3  NEUROLOGY: non-focal  SKIN: No rashes or lesions    LABS:                        11.1   24.74 )-----------( 112      ( 04 May 2024 14:55 )             34.9     05-04    134<L>  |  96<L>  |  36<H>  ----------------------------<  185<H>  5.3   |  18<L>  |  4.38<H>    Ca    8.6      04 May 2024 14:55  Phos  5.4     05-04  Mg     2.20     05-04    TPro  7.0  /  Alb  3.7  /  TBili  1.0  /  DBili  x   /  AST  23  /  ALT  26  /  AlkPhos  73  05-04    PT/INR - ( 04 May 2024 04:00 )   PT: 13.9 sec;   INR: 1.24 ratio         PTT - ( 04 May 2024 10:20 )  PTT:60.6 sec  CARDIAC MARKERS ( 04 May 2024 01:20 )  x     / x     / 155 U/L / x     / x      CARDIAC MARKERS ( 03 May 2024 21:20 )  x     / x     / 172 U/L / x     / 1.8 ng/mL      Urinalysis Basic - ( 04 May 2024 14:55 )    Color: x / Appearance: x / SG: x / pH: x  Gluc: 185 mg/dL / Ketone: x  / Bili: x / Urobili: x   Blood: x / Protein: x / Nitrite: x   Leuk Esterase: x / RBC: x / WBC x   Sq Epi: x / Non Sq Epi: x / Bacteria: x        RADIOLOGY & ADDITIONAL TESTS:    Imaging Personally Reviewed:    Consultant(s) Notes Reviewed:      Care Discussed with Consultants/Other Providers:

## 2024-05-04 NOTE — CHART NOTE - NSCHARTNOTEFT_GEN_A_CORE
Case discussed with Epilepsy fellow Dr. Rosa Rodriguez. EEG showing GPDs, indicating elevated risk for seizures.    Recommend starting Keppra - initial 1500mg load, 500mg BID maintenance dose, with additional 500mg after every HD session.    Recommendations communicated to MICU team. Case discussed with Epilepsy fellow Dr. Rosa Rodriguez. EEG showing GPDs, indicating elevated risk for seizures.    Recommend starting Keppra - initial 1500mg load. While pt is on CRRT, maintenance dose of 1000mg BID at the current time (please check serum Keppra level prior to PM dose on 5/5). If pt taken off CRRT and transitioned to intermittent HD, then recommend 500mg BID maintenance dose, with additional 500mg after every HD session.    Recommendations communicated to MICU team.

## 2024-05-04 NOTE — PROGRESS NOTE ADULT - ASSESSMENT
73-year-old male past medical history hypertension, ESRD on dialysis Monday Wednesday Friday, diabetes insulin-dependent presents with hiccups patient endorses persistent hiccups for the last 2 days.   found to have peaked T waves, a new finding. denied dizziness, N/V, denies CP, palps, abd pain  Upon admission seen by CArd, renal and GI  found to have a distended GB prob 2/2 gastroparesis, was started on regaln  has received 4 doses of baclofen since 4/30 2/2 hiccups, last dose on 5/1 at 5 am  had received Haldol for agitation at 11 pm on 4/30, AMS observed in pm of 5/1  AMS ongoing, RRT x 2 called  now transferred to MICU for encephalopathy requiring airway protection airway protection on 5/2,  intubated for airway protection, on propofol and pressors.   HD was not done timely until femoral shiley was placed 2/2 clotted RUE AVF. now removed and RIJ shiley placed on 5/3  has been nonverbal since pm 5/1.     encephalopathy etiology not clear.   remains intubated for airway protection, sedated. on pressors, ID, Neuro following. being observed off Abx. esrd, had missed HD prior to AMS 2/2 clotted RUE AVF. fistulogram when medically stable. at present HD via RIght IJ Shiley. NGT in place in stomach. LP done, no sign of meningitis.         NEUROLOGY     - CTH without acute findings   - LP done, no acute findings  - awaiting  MRI brain and vEEG    CARDIOVASCULAR     - ptn never had CP. just peaked T waves. was awaiting ischemic study w nucl stress test  - TTE showing EF 72%  - pending NST with Cards once improvement in clinical status   - on pressors    GI  - on NGT feeds while sedated, intubated  - Gastroparesis       RENAL   - ESRD MWF  - cw HD with R IJ shiley. R fem removed  - pending fistula study of RUE      INFECTIOUS DISEASE   - new leukocytosis, r/o sepsis  - pan scan  of C/A/P on admission was neg  - check blood cx  - observe off ABx    ENDOCRINE   - DM  -cw lantus 6U and admelog 2U  - cw ISS    DVT ppx w [facility]  GOC:  Full code 73-year-old male past medical history hypertension, ESRD on dialysis Monday Wednesday Friday, diabetes insulin-dependent presents with hiccups patient endorses persistent hiccups for the last 2 days.   found to have peaked T waves, a new finding. denied dizziness, N/V, denies CP, palps, abd pain  Upon admission seen by CArd, renal and GI  found to have a distended GB prob 2/2 gastroparesis, was started on regaln  has received 4 doses of baclofen since 4/30 2/2 hiccups, last dose on 5/1 at 5 am  had received Haldol for agitation at 11 pm on 4/30, AMS observed in pm of 5/1  AMS ongoing, RRT x 2 called  now transferred to MICU for encephalopathy requiring airway protection airway protection on 5/2,  intubated for airway protection, on propofol and pressors.   HD was not done timely until femoral shiley was placed 2/2 clotted RUE AVF. now removed and RIJ shiley placed on 5/3  has been nonverbal since pm 5/1.     encephalopathy etiology not clear.   remains intubated for airway protection, sedated. on pressors, ID, Neuro following. being observed off Abx. esrd, had missed HD prior to AMS 2/2 clotted RUE AVF. fistulogram when medically stable. at present HD via RIght IJ Shiley. NGT in place in stomach. LP done, no sign of meningitis.         73M w/ PMH of HTN, ESRD (MWF), IDDM, p/f chest pain c/b hiccups for the last 2 days undergoing ischemic evaluation per cardiology. Pt s/p RRT x 2 for AMS. MICU consulted and accepting for airway monitoring in setting of ? baclofen toxicity.     NEUROLOGY   #AMS  - patient with multiple RRTs for AMS  - CTH without acute findings   - may be due to baclofen toxicity vs withdrawal  - neurology w/ concerns for meningoencephalitis; LP performed w/o signs of infection    PLAN  - follow up tox and neuro recs  - obtain MRI brain and vEEG  - cw HD/CRRT for concern of baclofen toxicity       CARDIOVASCULAR     #Angina  - patient admitted with chest pain and noted to have peaked T waves on admission   - TTE showing EF 72%  - EKG 5/3 demonstrating ST deviation noted on EKG  - Per cardiology, no plan for cath at this time  - s/p DAPT load 5/4  - will c/w ASA 81 mg daily with heparin gtt x 48 hours  - repeat TTE (5/4) demonstrating no significant interval changes    #HTN  - will hold bp meds in the setting of hypotension    RESPIRATORY   - intubated due to inability to protect airway    GI/NUTRITION   #Gastroporesis   - hold reglan for now     /RENAL   #ESRD MWF  - R fem shiley removed 5/2  - placement of IJ shiley 5/3  - pending fistula study of RUE (failed previous HD session)      INFECTIOUS DISEASE   #leukocytosis  - noted on repeat labs  - follow up infectious workup  - would monitor off antibiotics for now     ENDOCRINE   #IDD  - cw ISS    HEMATOLOGY/ONCOLOGY  FELIX      DVT PPX  heparin gtt    ETHICS  Full code

## 2024-05-04 NOTE — CHART NOTE - NSCHARTNOTEFT_GEN_A_CORE
EEG preliminary read (not final) on the initial recording hour(s) = x 1     Generalized periodic discharges up to 2.5 Hz that signifies increased risk for generalized onset seizures. There is a reasonable chance that this activity may also be contributing to impaired mentation.  No clear seizures recorded otherwise.    Final report to follow tomorrow morning after completion of study.    [facility] EEG Reading Room Ph#: [phone]  Epilepsy Answering Service after 5PM and before 8:30AM: Ph#: [phone]

## 2024-05-04 NOTE — PROGRESS NOTE ADULT - SUBJECTIVE AND OBJECTIVE BOX
Tiffany Martínez, PGY1    Patient is a 73y old  Male who presents with a chief complaint of Unstable angina, abn EKG (03 May 2024 18:21)      SUBJECTIVE / OVERNIGHT EVENTS: NAEO. Pt denies chest pain, SOB, N/V, fever/chills, or changes in bowel movements.    MEDICATIONS  (STANDING):  chlorhexidine 0.12% Liquid 15 milliLiter(s) Oral Mucosa every 12 hours  chlorhexidine 2% Cloths 1 Application(s) Topical <User Schedule>  CRRT Treatment    <Continuous>  dextrose 10% Bolus 125 milliLiter(s) IV Bolus once  dextrose 5%. 1000 milliLiter(s) (50 mL/Hr) IV Continuous <Continuous>  dextrose 5%. 1000 milliLiter(s) (100 mL/Hr) IV Continuous <Continuous>  dextrose 50% Injectable 12.5 Gram(s) IV Push once  dextrose 50% Injectable 25 Gram(s) IV Push once  epoetin reilly (EPOGEN) Injectable 59694 Unit(s) IV Push <User Schedule>  fentaNYL   Infusion..... 1 MICROgram(s)/kG/Hr (1.05 mL/Hr) IV Continuous <Continuous>  glucagon  Injectable 1 milliGRAM(s) IntraMuscular once  heparin  Infusion.  Unit(s)/Hr (6.5 mL/Hr) IV Continuous <Continuous>  insulin lispro (ADMELOG) corrective regimen sliding scale   SubCutaneous every 6 hours  norepinephrine Infusion 0.05 MICROgram(s)/kG/Min (2.47 mL/Hr) IV Continuous <Continuous>  petrolatum Ophthalmic Ointment 1 Application(s) Both EYES two times a day  piperacillin/tazobactam IVPB.. 3.375 Gram(s) IV Intermittent every 12 hours  PrismaSATE Dialysate BK 0 / 3.5 5000 milliLiter(s) (1000 mL/Hr) CRRT <Continuous>  PrismaSOL Filtration BGK 4 / 2.5 5000 milliLiter(s) (800 mL/Hr) CRRT <Continuous>  PrismaSOL Filtration BGK 4 / 2.5 5000 milliLiter(s) (200 mL/Hr) CRRT <Continuous>  propofol Infusion 20 MICROgram(s)/kG/Min (6.31 mL/Hr) IV Continuous <Continuous>  vasopressin Infusion 0.03 Unit(s)/Min (4.5 mL/Hr) IV Continuous <Continuous>    MEDICATIONS  (PRN):  dextrose Oral Gel 15 Gram(s) Oral once PRN Blood Glucose LESS THAN 70 milliGRAM(s)/deciliter      CAPILLARY BLOOD GLUCOSE      POCT Blood Glucose.: 219 mg/dL (04 May 2024 05:07)  POCT Blood Glucose.: 239 mg/dL (03 May 2024 23:22)  POCT Blood Glucose.: 240 mg/dL (03 May 2024 18:30)  POCT Blood Glucose.: 188 mg/dL (03 May 2024 12:32)    I&O's Summary    03 May 2024 07:01  -  04 May 2024 07:00  --------------------------------------------------------  IN: 962.2 mL / OUT: 540 mL / NET: 422.2 mL        Vital Signs Last 24 Hrs  T(C): 34.5 (04 May 2024 04:00), Max: 39.4 (03 May 2024 16:30)  T(F): 94.1 (04 May 2024 04:00), Max: 103 (03 May 2024 16:30)  HR: 45 (04 May 2024 07:23) (35 - 103)  BP: 144/63 (03 May 2024 22:15) (80/48 - 220/95)  BP(mean): 84 (03 May 2024 22:15) (57 - 127)  RR: 14 (04 May 2024 07:23) (7 - 22)  SpO2: 99% (04 May 2024 07:23) (93% - 100%)    Parameters below as of 04 May 2024 06:00  Patient On (Oxygen Delivery Method): ventilator    O2 Concentration (%): 30    PHYSICAL EXAM:  GENERAL: NAD, well-developed, well-nourished  HEAD: Atraumatic, Normocephalic  EYES: Conjunctiva and sclera clear  CHEST/LUNG: Clear to auscultation bilaterally; No wheezes or crackles  HEART: Normal S1/S2; Regular rate and rhythm; No murmurs, rubs, or gallops  ABDOMEN: Soft, Nontender, Nondistended; Bowel sounds present  EXTREMITIES: No clubbing, cyanosis, or edema  PSYCH: A&Ox3      LABS:                        11.9   25.36 )-----------( 106      ( 04 May 2024 04:00 )             36.9      05-04    133<L>  |  95<L>  |  50<H>  ----------------------------<  245<H>  5.3   |  18<L>  |  6.47<H>    Ca    8.6      04 May 2024 04:00  Phos  6.8     05-04  Mg     2.30     05-04    TPro  7.0  /  Alb  3.5  /  TBili  0.8  /  DBili  x   /  AST  26  /  ALT  25  /  AlkPhos  79  05-04    PT/INR - ( 04 May 2024 04:00 )   PT: 13.9 sec;   INR: 1.24 ratio         PTT - ( 04 May 2024 04:00 )  PTT:69.5 sec  CARDIAC MARKERS ( 04 May 2024 01:20 )  x     / x     / 155 U/L / x     / x      CARDIAC MARKERS ( 03 May 2024 21:20 )  x     / x     / 172 U/L / x     / 1.8 ng/mL      Urinalysis Basic - ( 04 May 2024 04:00 )    Color: x / Appearance: x / SG: x / pH: x  Gluc: 245 mg/dL / Ketone: x  / Bili: x / Urobili: x   Blood: x / Protein: x / Nitrite: x   Leuk Esterase: x / RBC: x / WBC x   Sq Epi: x / Non Sq Epi: x / Bacteria: x        RADIOLOGY & ADDITIONAL TESTS:     Tiffany Martínez, PGY1    Patient is a 73y old  Male who presents with a chief complaint of Unstable angina, abn EKG (03 May 2024 18:21)      SUBJECTIVE / OVERNIGHT EVENTS: Pt hypothermic and bradycardic overnight, Pt given atropine.     MEDICATIONS  (STANDING):  chlorhexidine 0.12% Liquid 15 milliLiter(s) Oral Mucosa every 12 hours  chlorhexidine 2% Cloths 1 Application(s) Topical <User Schedule>  CRRT Treatment    <Continuous>  dextrose 10% Bolus 125 milliLiter(s) IV Bolus once  dextrose 5%. 1000 milliLiter(s) (50 mL/Hr) IV Continuous <Continuous>  dextrose 5%. 1000 milliLiter(s) (100 mL/Hr) IV Continuous <Continuous>  dextrose 50% Injectable 12.5 Gram(s) IV Push once  dextrose 50% Injectable 25 Gram(s) IV Push once  epoetin reilly (EPOGEN) Injectable 82819 Unit(s) IV Push <User Schedule>  fentaNYL   Infusion..... 1 MICROgram(s)/kG/Hr (1.05 mL/Hr) IV Continuous <Continuous>  glucagon  Injectable 1 milliGRAM(s) IntraMuscular once  heparin  Infusion.  Unit(s)/Hr (6.5 mL/Hr) IV Continuous <Continuous>  insulin lispro (ADMELOG) corrective regimen sliding scale   SubCutaneous every 6 hours  norepinephrine Infusion 0.05 MICROgram(s)/kG/Min (2.47 mL/Hr) IV Continuous <Continuous>  petrolatum Ophthalmic Ointment 1 Application(s) Both EYES two times a day  piperacillin/tazobactam IVPB.. 3.375 Gram(s) IV Intermittent every 12 hours  PrismaSATE Dialysate BK 0 / 3.5 5000 milliLiter(s) (1000 mL/Hr) CRRT <Continuous>  PrismaSOL Filtration BGK 4 / 2.5 5000 milliLiter(s) (800 mL/Hr) CRRT <Continuous>  PrismaSOL Filtration BGK 4 / 2.5 5000 milliLiter(s) (200 mL/Hr) CRRT <Continuous>  propofol Infusion 20 MICROgram(s)/kG/Min (6.31 mL/Hr) IV Continuous <Continuous>  vasopressin Infusion 0.03 Unit(s)/Min (4.5 mL/Hr) IV Continuous <Continuous>    MEDICATIONS  (PRN):  dextrose Oral Gel 15 Gram(s) Oral once PRN Blood Glucose LESS THAN 70 milliGRAM(s)/deciliter      CAPILLARY BLOOD GLUCOSE      POCT Blood Glucose.: 219 mg/dL (04 May 2024 05:07)  POCT Blood Glucose.: 239 mg/dL (03 May 2024 23:22)  POCT Blood Glucose.: 240 mg/dL (03 May 2024 18:30)  POCT Blood Glucose.: 188 mg/dL (03 May 2024 12:32)    I&O's Summary    03 May 2024 07:01  -  04 May 2024 07:00  --------------------------------------------------------  IN: 962.2 mL / OUT: 540 mL / NET: 422.2 mL        Vital Signs Last 24 Hrs  T(C): 34.5 (04 May 2024 04:00), Max: 39.4 (03 May 2024 16:30)  T(F): 94.1 (04 May 2024 04:00), Max: 103 (03 May 2024 16:30)  HR: 45 (04 May 2024 07:23) (35 - 103)  BP: 144/63 (03 May 2024 22:15) (80/48 - 220/95)  BP(mean): 84 (03 May 2024 22:15) (57 - 127)  RR: 14 (04 May 2024 07:23) (7 - 22)  SpO2: 99% (04 May 2024 07:23) (93% - 100%)    Parameters below as of 04 May 2024 06:00  Patient On (Oxygen Delivery Method): ventilator    O2 Concentration (%): 30    PHYSICAL EXAM  GENERAL: ill-appearing   HEAD:  Atraumatic, Normocephalic  EYES: conjunctiva and sclera clear  ENT: Moist mucous membranes  NECK: Supple, No JVD  CHEST/LUNG:  rhonchi auscultated b/l   HEART: Regular rate and rhythm; No murmurs, rubs, or gallops  ABDOMEN: BSx4; Soft, nontender, nondistended  EXTREMITIES:   No clubbing, cyanosis, or edema  NERVOUS SYSTEM:  sedated, A&Ox0, no focal deficits   SKIN: No rashes or lesions        LABS:                        11.9   25.36 )-----------( 106      ( 04 May 2024 04:00 )             36.9      05-04    133<L>  |  95<L>  |  50<H>  ----------------------------<  245<H>  5.3   |  18<L>  |  6.47<H>    Ca    8.6      04 May 2024 04:00  Phos  6.8     05-04  Mg     2.30     05-04    TPro  7.0  /  Alb  3.5  /  TBili  0.8  /  DBili  x   /  AST  26  /  ALT  25  /  AlkPhos  79  05-04    PT/INR - ( 04 May 2024 04:00 )   PT: 13.9 sec;   INR: 1.24 ratio         PTT - ( 04 May 2024 04:00 )  PTT:69.5 sec  CARDIAC MARKERS ( 04 May 2024 01:20 )  x     / x     / 155 U/L / x     / x      CARDIAC MARKERS ( 03 May 2024 21:20 )  x     / x     / 172 U/L / x     / 1.8 ng/mL      Urinalysis Basic - ( 04 May 2024 04:00 )    Color: x / Appearance: x / SG: x / pH: x  Gluc: 245 mg/dL / Ketone: x  / Bili: x / Urobili: x   Blood: x / Protein: x / Nitrite: x   Leuk Esterase: x / RBC: x / WBC x   Sq Epi: x / Non Sq Epi: x / Bacteria: x        RADIOLOGY & ADDITIONAL TESTS:

## 2024-05-04 NOTE — PROGRESS NOTE ADULT - SUBJECTIVE AND OBJECTIVE BOX
Patient is a 73y Male     Patient is a 73y old  Male who presents with a chief complaint of Unstable angina, abn EKG (04 May 2024 14:31)      HPI:  73-year-old male past medical history hypertension, ESRD on dialysis Monday Wednesday Friday, diabetes insulin-dependent presents with hiccups patient endorses persistent hiccups for the last 2 days. found to have peaked T waves, a new finding. denies dizziness, N/V, denies CP, palps, abd pain (29 Apr 2024 21:15)      PAST MEDICAL & SURGICAL HISTORY:  Diabetes      Benign essential HTN      HLD (hyperlipidemia)      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      ESRD on hemodialysis      Arteriovenous fistula          MEDICATIONS  (STANDING):  chlorhexidine 0.12% Liquid 15 milliLiter(s) Oral Mucosa every 12 hours  chlorhexidine 2% Cloths 1 Application(s) Topical <User Schedule>  CRRT Treatment    <Continuous>  dextrose 10% Bolus 125 milliLiter(s) IV Bolus once  dextrose 5%. 1000 milliLiter(s) (50 mL/Hr) IV Continuous <Continuous>  dextrose 5%. 1000 milliLiter(s) (100 mL/Hr) IV Continuous <Continuous>  dextrose 50% Injectable 12.5 Gram(s) IV Push once  dextrose 50% Injectable 25 Gram(s) IV Push once  epoetin reilly (EPOGEN) Injectable 21251 Unit(s) IV Push <User Schedule>  fentaNYL   Infusion..... 1 MICROgram(s)/kG/Hr (1.05 mL/Hr) IV Continuous <Continuous>  glucagon  Injectable 1 milliGRAM(s) IntraMuscular once  heparin  Infusion.  Unit(s)/Hr (6.5 mL/Hr) IV Continuous <Continuous>  insulin lispro (ADMELOG) corrective regimen sliding scale   SubCutaneous every 6 hours  norepinephrine Infusion 0.05 MICROgram(s)/kG/Min (2.47 mL/Hr) IV Continuous <Continuous>  petrolatum Ophthalmic Ointment 1 Application(s) Both EYES two times a day  piperacillin/tazobactam IVPB.. 3.375 Gram(s) IV Intermittent every 12 hours  PrismaSATE Dialysate BGK 4 / 2.5 5000 milliLiter(s) (1000 mL/Hr) CRRT <Continuous>  PrismaSOL Filtration BGK 4 / 2.5 5000 milliLiter(s) (200 mL/Hr) CRRT <Continuous>  PrismaSOL Filtration BGK 4 / 2.5 5000 milliLiter(s) (800 mL/Hr) CRRT <Continuous>  propofol Infusion 20 MICROgram(s)/kG/Min (6.31 mL/Hr) IV Continuous <Continuous>  vasopressin Infusion 0.03 Unit(s)/Min (4.5 mL/Hr) IV Continuous <Continuous>      Allergies    No Known Allergies    Intolerances        SOCIAL HISTORY:  Denies ETOh,Smoking,     FAMILY HISTORY:  FHx: diabetes mellitus (Father, Aunt)        REVIEW OF SYSTEMS:    CONSTITUTIONAL: No weakness, fevers or chills  EYES/ENT: No visual changes;  No vertigo or throat pain   NECK: No pain or stiffness  RESPIRATORY: No cough, wheezing, hemoptysis; No shortness of breath  CARDIOVASCULAR: No chest pain or palpitations  GASTROINTESTINAL: No abdominal or epigastric pain. No nausea, vomiting, or hematemesis; No diarrhea or constipation. No melena or hematochezia.  GENITOURINARY: No dysuria, frequency or hematuria  NEUROLOGICAL: No numbness or weakness  SKIN: No itching, burning, rashes, or lesions   All other review of systems is negative unless indicated above.    VITAL:  T(C): , Max: 39.4 (05-03-24 @ 16:30)  T(F): , Max: 103 (05-03-24 @ 16:30)  HR: 46 (05-04-24 @ 14:14)  BP: 144/63 (05-03-24 @ 22:15)  BP(mean): 84 (05-03-24 @ 22:15)  RR: 14 (05-04-24 @ 14:00)  SpO2: 99% (05-04-24 @ 14:14)  Wt(kg): --    I and O's:    05-02 @ 07:01  -  05-03 @ 07:00  --------------------------------------------------------  IN: 290.5 mL / OUT: 0 mL / NET: 290.5 mL    05-03 @ 07:01  -  05-04 @ 07:00  --------------------------------------------------------  IN: 991.6 mL / OUT: 607 mL / NET: 384.6 mL    05-04 @ 07:01  -  05-04 @ 14:57  --------------------------------------------------------  IN: 209.2 mL / OUT: 22 mL / NET: 187.2 mL          PHYSICAL EXAM:    Constitutional: NAD  HEENT: PERRLA,   Neck: No JVD  Respiratory: CTA B/L  Cardiovascular: S1 and S2  Gastrointestinal: BS+, soft, NT/ND  Extremities: No peripheral edema  Neurological: A/O x 3, no focal deficits  Psychiatric: Normal mood, normal affect  : No Abbasi  Skin: No rashes  Access: Not applicable  Back: No CVA tenderness    LABS:                        11.7   25.40 )-----------( 114      ( 04 May 2024 08:55 )             35.2     05-04    133<L>  |  95<L>  |  40<H>  ----------------------------<  191<H>  5.2   |  19<L>  |  5.30<H>    Ca    8.6      04 May 2024 08:55  Phos  6.2     05-04  Mg     2.20     05-04    TPro  7.0  /  Alb  3.7  /  TBili  0.9  /  DBili  x   /  AST  25  /  ALT  25  /  AlkPhos  74  05-04          RADIOLOGY & ADDITIONAL STUDIES:

## 2024-05-04 NOTE — PROGRESS NOTE ADULT - ASSESSMENT
73-year-old male past medical history hypertension, ESRD on dialysis Monday Wednesday Friday, diabetes insulin-dependent presents with hiccups patient endorses persistent hiccups for the last 2 days.   found to have peaked T waves, a new finding. denied dizziness, N/V, denies CP, palps, abd pain  Upon admission seen by CArd, renal and GI  found to have a distended GB prob 2/2 gastroparesis, was started on regaln  has received 4 doses of baclofen since 4/30 2/2 hiccups, last dose on 5/1 at 5 am  had received Haldol for agitation at 11 pm on 4/30, AMS observed in pm of 5/1  AMS ongoing, RRT x 2 called  now transferred to MICU for encephalopathy requiring airway protection airway protection on 5/2,  intubated for airway protection, on propofol and pressors.   HD was not done timely until femoral shiley was placed 2/2 clotted RUE AVF. now removed and RIJ shiley placed on 5/3  has been nonverbal since pm 5/1.     encephalopathy etiology not clear.   remains intubated for airway protection,   on pressors, on CRRT,   septic on ZOsyn w Tmax 103,   EKG changes on 5/3, loaded w DAPT now on Heparin drip  ID, Neuro , renal, cardiology following.  esrd, had missed HD prior to AMS 2/2 clotted RUE AVF. fistulogram when medically stable.    HD via RIght IJ Shiley.   NGT in place in stomach.   LP done, no sign of meningitis.         NEUROLOGY     - CTH without acute findings   - LP done, no acute findings  - awaiting  MRI brain and vEEG as per neuro    CARDIOVASCULAR     - ptn never had CP. just peaked T waves. was awaiting ischemic study w nucl stress test  - TTE showing EF 72%, rpt unchanged  -EKG changes on 5/3, loaded w DAPT now on Heparin drip  - on pressors    GI  - on NGT feeds while sedated, intubated  - Gastroparesis       RENAL   - CRRT in MICU  - via R IJ shiley. R fem removed  - pending fistula study of RUE when medically optimized      INFECTIOUS DISEASE   - sepsis, Tmax 103  - pan scan  of C/A/P on admission was neg  -  blood cx NTD  - on ZOsyn    ENDOCRINE   - DM  - cw ISS    GOC:  Full code

## 2024-05-04 NOTE — PROGRESS NOTE ADULT - ASSESSMENT
73-year-old male past medical history hypertension, ESRD on dialysis Monday Wednesday Friday, diabetes insulin-dependent presents with hiccups patient endorses persistent hiccups for the last 2 days. Nephrology consulted for HD needs.    A/P  ESRD:  Center: [facility]  Nephrologist: Dr. Hardwick  Access: R AVF  MWF schedule  Vascular for fistulogram   s/p femoral shiley on 5/1, now removed  s/p placement of IJ shiley 5/3  Currently on CRRT.  Consent obtained and placed in ED chart  Renal diet  Monitor BMP    Hyperkalemia  s/p HD on 5/1  Stable.  C/W CRRT.  Low K Diet.  Monitor closely     HTN:  Fluctuating; low  On pressors.  Care per ICU.  Monitor BP    Anemia:  Above goal  Monitor Hb  SILVA if Hgb <11.    CKD-MBD  Check PTH  PO4 high - consider calcium acetate 667mg TID.  Low PO4 diet.  Monitor Ca, PO4 daily

## 2024-05-04 NOTE — PROGRESS NOTE ADULT - SUBJECTIVE AND OBJECTIVE BOX
[facility] [place] [facility]   MD ELIANE BHAGAT MD ANGELA WONG, ROBBIN GREEN, NP    TEL:  OFFICE: [phone]  From 5pm-7am Answering Service [phone]    -- RENAL FOLLOW UP NOTE ---Date of Service 05-04-24 @ 14:31    Patient is a 73y old  Male who presents with a chief complaint of Unstable angina, abn EKG.    Patient seen and examined at bedside, in ICU.  Intubated; no apparent distress    VITALS:  T(F): 96.9 (05-04-24 @ 08:00), Max: 103 (05-03-24 @ 16:30)  HR: 46 (05-04-24 @ 14:14)  BP: 144/63 (05-03-24 @ 22:15)  RR: 14 (05-04-24 @ 13:30)  SpO2: 99% (05-04-24 @ 14:14)  Wt(kg): --    05-03 @ 07:01  -  05-04 @ 07:00  --------------------------------------------------------  IN: 991.6 mL / OUT: 607 mL / NET: 384.6 mL    05-04 @ 07:01  -  05-04 @ 14:31  --------------------------------------------------------  IN: 149.6 mL / OUT: 22 mL / NET: 127.6 mL      PHYSICAL EXAM:  General: NAD  Neck: No JVD  Respiratory: CTAB, no wheezes, rales or rhonchi.  Intubated; no respiratory distress.  Cardiovascular: S1, S2, RRR  Gastrointestinal: BS+, soft, NT/ND  Extremities: No peripheral edema    Hospital Medications:   MEDICATIONS  (STANDING):  chlorhexidine 0.12% Liquid 15 milliLiter(s) Oral Mucosa every 12 hours  chlorhexidine 2% Cloths 1 Application(s) Topical <User Schedule>  CRRT Treatment    <Continuous>  dextrose 10% Bolus 125 milliLiter(s) IV Bolus once  dextrose 5%. 1000 milliLiter(s) (50 mL/Hr) IV Continuous <Continuous>  dextrose 5%. 1000 milliLiter(s) (100 mL/Hr) IV Continuous <Continuous>  dextrose 50% Injectable 12.5 Gram(s) IV Push once  dextrose 50% Injectable 25 Gram(s) IV Push once  epoetin reilly (EPOGEN) Injectable 14748 Unit(s) IV Push <User Schedule>  fentaNYL   Infusion..... 1 MICROgram(s)/kG/Hr (1.05 mL/Hr) IV Continuous <Continuous>  glucagon  Injectable 1 milliGRAM(s) IntraMuscular once  heparin  Infusion.  Unit(s)/Hr (6.5 mL/Hr) IV Continuous <Continuous>  insulin lispro (ADMELOG) corrective regimen sliding scale   SubCutaneous every 6 hours  norepinephrine Infusion 0.05 MICROgram(s)/kG/Min (2.47 mL/Hr) IV Continuous <Continuous>  petrolatum Ophthalmic Ointment 1 Application(s) Both EYES two times a day  piperacillin/tazobactam IVPB.. 3.375 Gram(s) IV Intermittent every 12 hours  PrismaSATE Dialysate BGK 4 / 2.5 5000 milliLiter(s) (1000 mL/Hr) CRRT <Continuous>  PrismaSOL Filtration BGK 4 / 2.5 5000 milliLiter(s) (200 mL/Hr) CRRT <Continuous>  PrismaSOL Filtration BGK 4 / 2.5 5000 milliLiter(s) (800 mL/Hr) CRRT <Continuous>  propofol Infusion 20 MICROgram(s)/kG/Min (6.31 mL/Hr) IV Continuous <Continuous>  vasopressin Infusion 0.03 Unit(s)/Min (4.5 mL/Hr) IV Continuous <Continuous>      LABS:  05-04    133<L>  |  95<L>  |  40<H>  ----------------------------<  191<H>  5.2   |  19<L>  |  5.30<H>    Ca    8.6      04 May 2024 08:55  Phos  6.2     05-04  Mg     2.20     05-04    TPro  7.0  /  Alb  3.7  /  TBili  0.9  /  DBili      /  AST  25  /  ALT  25  /  AlkPhos  74  05-04    Creatinine Trend: 5.30 <--, 6.47 <--, 7.57 <--, 9.22 <--, 7.72 <--, 4.98 <--, 7.58 <--, 7.16 <--, 9.02 <--, 7.74 <--, 6.28 <--    Albumin: 3.7 g/dL (05-04 @ 08:55)  Phosphorus: 6.2 mg/dL (05-04 @ 08:55)  Albumin: 3.5 g/dL (05-04 @ 04:00)  Phosphorus: 6.8 mg/dL (05-04 @ 04:00)  Albumin: 3.4 g/dL (05-04 @ 01:20)  Phosphorus: 6.1 mg/dL (05-04 @ 01:20)  Albumin: 3.7 g/dL (05-03 @ 21:20)  Phosphorus: 6.5 mg/dL (05-03 @ 21:20)                          11.7   25.40 )-----------( 114      ( 04 May 2024 08:55 )             35.2     Urine Studies:  Urinalysis - [05-04-24 @ 08:55]      Color  / Appearance  / SG  / pH       Gluc 191 / Ketone   / Bili  / Urobili        Blood  / Protein  / Leuk Est  / Nitrite       RBC  / WBC  / Hyaline  / Gran  / Sq Epi  / Non Sq Epi  / Bacteria       Iron 47, TIBC --, %sat --      [05-01-24 @ 06:44]  TSH 2.79      [04-30-24 @ 06:03]    HBsAb <3.0      [05-01-24 @ 14:42]  HBcAb Nonreact      [05-01-24 @ 14:42]

## 2024-05-04 NOTE — PROGRESS NOTE ADULT - SUBJECTIVE AND OBJECTIVE BOX
Cardiovascular Disease Progress Note  DATE OF SERVICE: 24 @ 07:49    Overnight events: Events noted overnight.    The patient is intubated and sedated.  CRRT underway in MICU.     Objective Findings:  T(C): 34.5 (24 @ 04:00), Max: 39.4 (24 @ 16:30)  HR: 48 (24 @ 07:26) (35 - 103)  BP: 144/63 (24 @ 22:15) (80/48 - 220/95)  RR: 14 (24 @ 07:23) (7 - 22)  SpO2: 99% (24 @ 07:26) (93% - 100%)  Wt(kg): --  Daily     Daily Weight in k.6 (04 May 2024 00:00)      Physical Exam:  Gen: NAD; Patient resting comfortably  HEENT:  Normocephalic/ atraumatic  CV: RRR, normal S1 + S2, no m/r/g  Lungs:  Normal respiratory effort; mechanical ventilation via ETT  Abd: soft, non-tender; bowel sounds present  Ext: No edema; warm and well perfused    Telemetry: Sinus    Laboratory Data:                        11.9   25.36 )-----------( 106      ( 04 May 2024 04:00 )             36.9     05-04    133<L>  |  95<L>  |  50<H>  ----------------------------<  245<H>  5.3   |  18<L>  |  6.47<H>    Ca    8.6      04 May 2024 04:00  Phos  6.8     05-  Mg     2.30     05-    TPro  7.0  /  Alb  3.5  /  TBili  0.8  /  DBili  x   /  AST  26  /  ALT  25  /  AlkPhos  79  05-04    PT/INR - ( 04 May 2024 04:00 )   PT: 13.9 sec;   INR: 1.24 ratio         PTT - ( 04 May 2024 04:00 )  PTT:69.5 sec  CARDIAC MARKERS ( 04 May 2024 01:20 )  x     / x     / 155 U/L / x     / x      CARDIAC MARKERS ( 03 May 2024 21:20 )  x     / x     / 172 U/L / x     / 1.8 ng/mL          Inpatient Medications:  MEDICATIONS  (STANDING):  chlorhexidine 0.12% Liquid 15 milliLiter(s) Oral Mucosa every 12 hours  chlorhexidine 2% Cloths 1 Application(s) Topical <User Schedule>  CRRT Treatment    <Continuous>  dextrose 10% Bolus 125 milliLiter(s) IV Bolus once  dextrose 5%. 1000 milliLiter(s) (50 mL/Hr) IV Continuous <Continuous>  dextrose 5%. 1000 milliLiter(s) (100 mL/Hr) IV Continuous <Continuous>  dextrose 50% Injectable 12.5 Gram(s) IV Push once  dextrose 50% Injectable 25 Gram(s) IV Push once  epoetin reilly (EPOGEN) Injectable 63412 Unit(s) IV Push <User Schedule>  fentaNYL   Infusion..... 1 MICROgram(s)/kG/Hr (1.05 mL/Hr) IV Continuous <Continuous>  glucagon  Injectable 1 milliGRAM(s) IntraMuscular once  heparin  Infusion.  Unit(s)/Hr (6.5 mL/Hr) IV Continuous <Continuous>  insulin lispro (ADMELOG) corrective regimen sliding scale   SubCutaneous every 6 hours  norepinephrine Infusion 0.05 MICROgram(s)/kG/Min (2.47 mL/Hr) IV Continuous <Continuous>  petrolatum Ophthalmic Ointment 1 Application(s) Both EYES two times a day  piperacillin/tazobactam IVPB.. 3.375 Gram(s) IV Intermittent every 12 hours  PrismaSATE Dialysate BK 0 / 3.5 5000 milliLiter(s) (1000 mL/Hr) CRRT <Continuous>  PrismaSOL Filtration BGK 4 / 2.5 5000 milliLiter(s) (200 mL/Hr) CRRT <Continuous>  PrismaSOL Filtration BGK 4 / 2.5 5000 milliLiter(s) (800 mL/Hr) CRRT <Continuous>  propofol Infusion 20 MICROgram(s)/kG/Min (6.31 mL/Hr) IV Continuous <Continuous>  vasopressin Infusion 0.03 Unit(s)/Min (4.5 mL/Hr) IV Continuous <Continuous>      Assessment: 73 year old man with HTN, HLD, T2DM on insulin, and ESRD on HD presents with supply demand ischemia and angina.    Plan of Care:    #Type II myocardial infarction-  Secondary to septic shock with increasing pressor requirement.   ST deviation noted on EKG, however interpretation is limited by LVH with repolarization changes.  I would not pursue cath at this time given clinical instability.  Medical management of NSTEMI.  s/p ASA and Plavix load.  ASA 81 mg daily with heparin gtt x 48 hours while monitoring HD access site.  Repeat echo to assess for wall motion abnormality.     #Sinus bradycardia-  No evidence of AV block on admission EKG or telemetry.  No indication for pacing at this time.     #HTN-  Now in shock on multiple pressor support.     #ESRD-  UF underway in the MICU.       Patient critically ill in the MICU        Over 74 minutes of critical care time spent on total encounter; more than 50% of the visit was spent counseling and/or coordinating care by the attending physician.      Geovani Bravo MD [facility]  Cardiovascular Disease  [phone]

## 2024-05-05 LAB
ALBUMIN SERPL ELPH-MCNC: 3.2 G/DL — LOW (ref 3.3–5)
ALBUMIN SERPL ELPH-MCNC: 3.2 G/DL — LOW (ref 3.3–5)
ALBUMIN SERPL ELPH-MCNC: 3.4 G/DL — SIGNIFICANT CHANGE UP (ref 3.3–5)
ALP SERPL-CCNC: 73 U/L — SIGNIFICANT CHANGE UP (ref 40–120)
ALP SERPL-CCNC: 76 U/L — SIGNIFICANT CHANGE UP (ref 40–120)
ALP SERPL-CCNC: 81 U/L — SIGNIFICANT CHANGE UP (ref 40–120)
ALT FLD-CCNC: 24 U/L — SIGNIFICANT CHANGE UP (ref 4–41)
ANION GAP SERPL CALC-SCNC: 13 MMOL/L — SIGNIFICANT CHANGE UP (ref 7–14)
ANION GAP SERPL CALC-SCNC: 15 MMOL/L — HIGH (ref 7–14)
ANION GAP SERPL CALC-SCNC: 16 MMOL/L — HIGH (ref 7–14)
APTT BLD: 47.9 SEC — HIGH (ref 24.5–35.6)
APTT BLD: 49.1 SEC — HIGH (ref 24.5–35.6)
APTT BLD: 49.8 SEC — HIGH (ref 24.5–35.6)
APTT BLD: 64 SEC — HIGH (ref 24.5–35.6)
AST SERPL-CCNC: 21 U/L — SIGNIFICANT CHANGE UP (ref 4–40)
AST SERPL-CCNC: 31 U/L — SIGNIFICANT CHANGE UP (ref 4–40)
AST SERPL-CCNC: 33 U/L — SIGNIFICANT CHANGE UP (ref 4–40)
BASOPHILS # BLD AUTO: 0.02 K/UL — SIGNIFICANT CHANGE UP (ref 0–0.2)
BASOPHILS # BLD AUTO: 0.03 K/UL — SIGNIFICANT CHANGE UP (ref 0–0.2)
BASOPHILS # BLD AUTO: 0.03 K/UL — SIGNIFICANT CHANGE UP (ref 0–0.2)
BASOPHILS NFR BLD AUTO: 0.1 % — SIGNIFICANT CHANGE UP (ref 0–2)
BASOPHILS NFR BLD AUTO: 0.2 % — SIGNIFICANT CHANGE UP (ref 0–2)
BASOPHILS NFR BLD AUTO: 0.2 % — SIGNIFICANT CHANGE UP (ref 0–2)
BILIRUB SERPL-MCNC: 0.7 MG/DL — SIGNIFICANT CHANGE UP (ref 0.2–1.2)
BILIRUB SERPL-MCNC: 0.7 MG/DL — SIGNIFICANT CHANGE UP (ref 0.2–1.2)
BILIRUB SERPL-MCNC: 0.8 MG/DL — SIGNIFICANT CHANGE UP (ref 0.2–1.2)
BLOOD GAS ARTERIAL COMPREHENSIVE RESULT: SIGNIFICANT CHANGE UP
BUN SERPL-MCNC: 21 MG/DL — SIGNIFICANT CHANGE UP (ref 7–23)
BUN SERPL-MCNC: 26 MG/DL — HIGH (ref 7–23)
BUN SERPL-MCNC: 31 MG/DL — HIGH (ref 7–23)
CALCIUM SERPL-MCNC: 8.2 MG/DL — LOW (ref 8.4–10.5)
CALCIUM SERPL-MCNC: 8.2 MG/DL — LOW (ref 8.4–10.5)
CALCIUM SERPL-MCNC: 8.7 MG/DL — SIGNIFICANT CHANGE UP (ref 8.4–10.5)
CHLORIDE SERPL-SCNC: 100 MMOL/L — SIGNIFICANT CHANGE UP (ref 98–107)
CHLORIDE SERPL-SCNC: 98 MMOL/L — SIGNIFICANT CHANGE UP (ref 98–107)
CHLORIDE SERPL-SCNC: 99 MMOL/L — SIGNIFICANT CHANGE UP (ref 98–107)
CO2 SERPL-SCNC: 20 MMOL/L — LOW (ref 22–31)
CO2 SERPL-SCNC: 21 MMOL/L — LOW (ref 22–31)
CO2 SERPL-SCNC: 21 MMOL/L — LOW (ref 22–31)
CREAT SERPL-MCNC: 2.06 MG/DL — HIGH (ref 0.5–1.3)
CREAT SERPL-MCNC: 2.39 MG/DL — HIGH (ref 0.5–1.3)
CREAT SERPL-MCNC: 2.95 MG/DL — HIGH (ref 0.5–1.3)
CULTURE RESULTS: ABNORMAL
EGFR: 22 ML/MIN/1.73M2 — LOW
EGFR: 28 ML/MIN/1.73M2 — LOW
EGFR: 34 ML/MIN/1.73M2 — LOW
EOSINOPHIL # BLD AUTO: 0.04 K/UL — SIGNIFICANT CHANGE UP (ref 0–0.5)
EOSINOPHIL # BLD AUTO: 0.17 K/UL — SIGNIFICANT CHANGE UP (ref 0–0.5)
EOSINOPHIL # BLD AUTO: 0.23 K/UL — SIGNIFICANT CHANGE UP (ref 0–0.5)
EOSINOPHIL NFR BLD AUTO: 0.2 % — SIGNIFICANT CHANGE UP (ref 0–6)
EOSINOPHIL NFR BLD AUTO: 0.9 % — SIGNIFICANT CHANGE UP (ref 0–6)
EOSINOPHIL NFR BLD AUTO: 1.4 % — SIGNIFICANT CHANGE UP (ref 0–6)
GLUCOSE BLDC GLUCOMTR-MCNC: 125 MG/DL — HIGH (ref 70–99)
GLUCOSE BLDC GLUCOMTR-MCNC: 134 MG/DL — HIGH (ref 70–99)
GLUCOSE BLDC GLUCOMTR-MCNC: 244 MG/DL — HIGH (ref 70–99)
GLUCOSE BLDC GLUCOMTR-MCNC: 71 MG/DL — SIGNIFICANT CHANGE UP (ref 70–99)
GLUCOSE SERPL-MCNC: 104 MG/DL — HIGH (ref 70–99)
GLUCOSE SERPL-MCNC: 124 MG/DL — HIGH (ref 70–99)
GLUCOSE SERPL-MCNC: 168 MG/DL — HIGH (ref 70–99)
HCT VFR BLD CALC: 31.6 % — LOW (ref 39–50)
HCT VFR BLD CALC: 31.8 % — LOW (ref 39–50)
HCT VFR BLD CALC: 33.7 % — LOW (ref 39–50)
HGB BLD-MCNC: 10.1 G/DL — LOW (ref 13–17)
HGB BLD-MCNC: 10.3 G/DL — LOW (ref 13–17)
HGB BLD-MCNC: 10.5 G/DL — LOW (ref 13–17)
IANC: 13.98 K/UL — HIGH (ref 1.8–7.4)
IANC: 15.36 K/UL — HIGH (ref 1.8–7.4)
IANC: 16.28 K/UL — HIGH (ref 1.8–7.4)
IMM GRANULOCYTES NFR BLD AUTO: 0.4 % — SIGNIFICANT CHANGE UP (ref 0–0.9)
IMM GRANULOCYTES NFR BLD AUTO: 0.5 % — SIGNIFICANT CHANGE UP (ref 0–0.9)
IMM GRANULOCYTES NFR BLD AUTO: 0.5 % — SIGNIFICANT CHANGE UP (ref 0–0.9)
INR BLD: 1.28 RATIO — HIGH (ref 0.85–1.18)
LYMPHOCYTES # BLD AUTO: 0.89 K/UL — LOW (ref 1–3.3)
LYMPHOCYTES # BLD AUTO: 0.91 K/UL — LOW (ref 1–3.3)
LYMPHOCYTES # BLD AUTO: 0.98 K/UL — LOW (ref 1–3.3)
LYMPHOCYTES # BLD AUTO: 4.9 % — LOW (ref 13–44)
LYMPHOCYTES # BLD AUTO: 5.4 % — LOW (ref 13–44)
LYMPHOCYTES # BLD AUTO: 5.4 % — LOW (ref 13–44)
MAGNESIUM SERPL-MCNC: 2.5 MG/DL — SIGNIFICANT CHANGE UP (ref 1.6–2.6)
MAGNESIUM SERPL-MCNC: 2.5 MG/DL — SIGNIFICANT CHANGE UP (ref 1.6–2.6)
MAGNESIUM SERPL-MCNC: 2.6 MG/DL — SIGNIFICANT CHANGE UP (ref 1.6–2.6)
MCHC RBC-ENTMCNC: 20 PG — LOW (ref 27–34)
MCHC RBC-ENTMCNC: 20.3 PG — LOW (ref 27–34)
MCHC RBC-ENTMCNC: 20.4 PG — LOW (ref 27–34)
MCHC RBC-ENTMCNC: 31.2 GM/DL — LOW (ref 32–36)
MCHC RBC-ENTMCNC: 31.8 GM/DL — LOW (ref 32–36)
MCHC RBC-ENTMCNC: 32.6 GM/DL — SIGNIFICANT CHANGE UP (ref 32–36)
MCV RBC AUTO: 62.7 FL — LOW (ref 80–100)
MCV RBC AUTO: 63.9 FL — LOW (ref 80–100)
MCV RBC AUTO: 64.1 FL — LOW (ref 80–100)
MONOCYTES # BLD AUTO: 1.26 K/UL — HIGH (ref 0–0.9)
MONOCYTES # BLD AUTO: 1.3 K/UL — HIGH (ref 0–0.9)
MONOCYTES # BLD AUTO: 1.51 K/UL — HIGH (ref 0–0.9)
MONOCYTES NFR BLD AUTO: 7 % — SIGNIFICANT CHANGE UP (ref 2–14)
MONOCYTES NFR BLD AUTO: 7.6 % — SIGNIFICANT CHANGE UP (ref 2–14)
MONOCYTES NFR BLD AUTO: 8.3 % — SIGNIFICANT CHANGE UP (ref 2–14)
NEUTROPHILS # BLD AUTO: 13.98 K/UL — HIGH (ref 1.8–7.4)
NEUTROPHILS # BLD AUTO: 15.36 K/UL — HIGH (ref 1.8–7.4)
NEUTROPHILS # BLD AUTO: 16.28 K/UL — HIGH (ref 1.8–7.4)
NEUTROPHILS NFR BLD AUTO: 84.7 % — HIGH (ref 43–77)
NEUTROPHILS NFR BLD AUTO: 84.9 % — HIGH (ref 43–77)
NEUTROPHILS NFR BLD AUTO: 87.4 % — HIGH (ref 43–77)
NRBC # BLD: 0 /100 WBCS — SIGNIFICANT CHANGE UP (ref 0–0)
NRBC # FLD: 0.05 K/UL — HIGH (ref 0–0)
NRBC # FLD: 0.07 K/UL — HIGH (ref 0–0)
NRBC # FLD: 0.08 K/UL — HIGH (ref 0–0)
PHOSPHATE SERPL-MCNC: 2.9 MG/DL — SIGNIFICANT CHANGE UP (ref 2.5–4.5)
PHOSPHATE SERPL-MCNC: 3.4 MG/DL — SIGNIFICANT CHANGE UP (ref 2.5–4.5)
PHOSPHATE SERPL-MCNC: 4.7 MG/DL — HIGH (ref 2.5–4.5)
PLATELET # BLD AUTO: 101 K/UL — LOW (ref 150–400)
PLATELET # BLD AUTO: 101 K/UL — LOW (ref 150–400)
PLATELET # BLD AUTO: 106 K/UL — LOW (ref 150–400)
POTASSIUM SERPL-MCNC: 4.4 MMOL/L — SIGNIFICANT CHANGE UP (ref 3.5–5.3)
POTASSIUM SERPL-MCNC: 5.1 MMOL/L — SIGNIFICANT CHANGE UP (ref 3.5–5.3)
POTASSIUM SERPL-MCNC: 5.3 MMOL/L — SIGNIFICANT CHANGE UP (ref 3.5–5.3)
POTASSIUM SERPL-SCNC: 4.4 MMOL/L — SIGNIFICANT CHANGE UP (ref 3.5–5.3)
POTASSIUM SERPL-SCNC: 5.1 MMOL/L — SIGNIFICANT CHANGE UP (ref 3.5–5.3)
POTASSIUM SERPL-SCNC: 5.3 MMOL/L — SIGNIFICANT CHANGE UP (ref 3.5–5.3)
PROT SERPL-MCNC: 6.6 G/DL — SIGNIFICANT CHANGE UP (ref 6–8.3)
PROT SERPL-MCNC: 6.6 G/DL — SIGNIFICANT CHANGE UP (ref 6–8.3)
PROT SERPL-MCNC: 6.7 G/DL — SIGNIFICANT CHANGE UP (ref 6–8.3)
PROTHROM AB SERPL-ACNC: 14.2 SEC — HIGH (ref 9.5–13)
RBC # BLD: 4.98 M/UL — SIGNIFICANT CHANGE UP (ref 4.2–5.8)
RBC # BLD: 5.04 M/UL — SIGNIFICANT CHANGE UP (ref 4.2–5.8)
RBC # BLD: 5.26 M/UL — SIGNIFICANT CHANGE UP (ref 4.2–5.8)
RBC # FLD: 20.7 % — HIGH (ref 10.3–14.5)
RBC # FLD: 20.8 % — HIGH (ref 10.3–14.5)
RBC # FLD: 21.5 % — HIGH (ref 10.3–14.5)
SODIUM SERPL-SCNC: 133 MMOL/L — LOW (ref 135–145)
SODIUM SERPL-SCNC: 134 MMOL/L — LOW (ref 135–145)
SODIUM SERPL-SCNC: 136 MMOL/L — SIGNIFICANT CHANGE UP (ref 135–145)
SPECIMEN SOURCE: SIGNIFICANT CHANGE UP
WBC # BLD: 16.48 K/UL — HIGH (ref 3.8–10.5)
WBC # BLD: 18.14 K/UL — HIGH (ref 3.8–10.5)
WBC # BLD: 18.63 K/UL — HIGH (ref 3.8–10.5)
WBC # FLD AUTO: 16.48 K/UL — HIGH (ref 3.8–10.5)
WBC # FLD AUTO: 18.14 K/UL — HIGH (ref 3.8–10.5)
WBC # FLD AUTO: 18.63 K/UL — HIGH (ref 3.8–10.5)

## 2024-05-05 PROCEDURE — 95720 EEG PHY/QHP EA INCR W/VEEG: CPT

## 2024-05-05 PROCEDURE — 99291 CRITICAL CARE FIRST HOUR: CPT

## 2024-05-05 RX ORDER — DEXTROSE 50 % IN WATER 50 %
25 SYRINGE (ML) INTRAVENOUS ONCE
Refills: 0 | Status: COMPLETED | OUTPATIENT
Start: 2024-05-05 | End: 2024-05-05

## 2024-05-05 RX ADMIN — HEPARIN SODIUM 950 UNIT(S)/HR: 5000 INJECTION INTRAVENOUS; SUBCUTANEOUS at 23:57

## 2024-05-05 RX ADMIN — Medication 2.47 MICROGRAM(S)/KG/MIN: at 19:14

## 2024-05-05 RX ADMIN — HEPARIN SODIUM 850 UNIT(S)/HR: 5000 INJECTION INTRAVENOUS; SUBCUTANEOUS at 10:42

## 2024-05-05 RX ADMIN — Medication 81 MILLIGRAM(S): at 12:05

## 2024-05-05 RX ADMIN — PIPERACILLIN AND TAZOBACTAM 25 GRAM(S): 4; .5 INJECTION, POWDER, LYOPHILIZED, FOR SOLUTION INTRAVENOUS at 11:52

## 2024-05-05 RX ADMIN — HEPARIN SODIUM 750 UNIT(S)/HR: 5000 INJECTION INTRAVENOUS; SUBCUTANEOUS at 07:46

## 2024-05-05 RX ADMIN — Medication 2.47 MICROGRAM(S)/KG/MIN: at 07:45

## 2024-05-05 RX ADMIN — Medication 1 APPLICATION(S): at 17:34

## 2024-05-05 RX ADMIN — HEPARIN SODIUM 950 UNIT(S)/HR: 5000 INJECTION INTRAVENOUS; SUBCUTANEOUS at 19:15

## 2024-05-05 RX ADMIN — LEVETIRACETAM 400 MILLIGRAM(S): 250 TABLET, FILM COATED ORAL at 22:23

## 2024-05-05 RX ADMIN — HEPARIN SODIUM 750 UNIT(S)/HR: 5000 INJECTION INTRAVENOUS; SUBCUTANEOUS at 04:27

## 2024-05-05 RX ADMIN — PIPERACILLIN AND TAZOBACTAM 25 GRAM(S): 4; .5 INJECTION, POWDER, LYOPHILIZED, FOR SOLUTION INTRAVENOUS at 23:57

## 2024-05-05 RX ADMIN — Medication 25 GRAM(S): at 12:00

## 2024-05-05 RX ADMIN — FENTANYL CITRATE 1.05 MICROGRAM(S)/KG/HR: 50 INJECTION INTRAVENOUS at 07:45

## 2024-05-05 RX ADMIN — CHLORHEXIDINE GLUCONATE 15 MILLILITER(S): 213 SOLUTION TOPICAL at 05:02

## 2024-05-05 RX ADMIN — HEPARIN SODIUM 950 UNIT(S)/HR: 5000 INJECTION INTRAVENOUS; SUBCUTANEOUS at 17:23

## 2024-05-05 RX ADMIN — FENTANYL CITRATE 1.05 MICROGRAM(S)/KG/HR: 50 INJECTION INTRAVENOUS at 19:14

## 2024-05-05 RX ADMIN — LEVETIRACETAM 400 MILLIGRAM(S): 250 TABLET, FILM COATED ORAL at 10:01

## 2024-05-05 RX ADMIN — CHLORHEXIDINE GLUCONATE 1 APPLICATION(S): 213 SOLUTION TOPICAL at 05:02

## 2024-05-05 RX ADMIN — FENTANYL CITRATE 1.05 MICROGRAM(S)/KG/HR: 50 INJECTION INTRAVENOUS at 06:18

## 2024-05-05 RX ADMIN — Medication 1 APPLICATION(S): at 05:02

## 2024-05-05 RX ADMIN — CHLORHEXIDINE GLUCONATE 15 MILLILITER(S): 213 SOLUTION TOPICAL at 17:34

## 2024-05-05 NOTE — PROGRESS NOTE ADULT - SUBJECTIVE AND OBJECTIVE BOX
Patient is a 73y Male     Patient is a 73y old  Male who presents with a chief complaint of Unstable angina, abn EKG (05 May 2024 09:19)      HPI:  73-year-old male past medical history hypertension, ESRD on dialysis Monday Wednesday Friday, diabetes insulin-dependent presents with hiccups patient endorses persistent hiccups for the last 2 days. found to have peaked T waves, a new finding. denies dizziness, N/V, denies CP, palps, abd pain (29 Apr 2024 21:15)      PAST MEDICAL & SURGICAL HISTORY:  Diabetes      Benign essential HTN      HLD (hyperlipidemia)      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      ESRD on hemodialysis      Arteriovenous fistula          MEDICATIONS  (STANDING):  aspirin  chewable 81 milliGRAM(s) Oral daily  chlorhexidine 0.12% Liquid 15 milliLiter(s) Oral Mucosa every 12 hours  chlorhexidine 2% Cloths 1 Application(s) Topical <User Schedule>  CRRT Treatment    <Continuous>  dextrose 10% Bolus 125 milliLiter(s) IV Bolus once  dextrose 5%. 1000 milliLiter(s) (50 mL/Hr) IV Continuous <Continuous>  dextrose 5%. 1000 milliLiter(s) (100 mL/Hr) IV Continuous <Continuous>  dextrose 50% Injectable 12.5 Gram(s) IV Push once  dextrose 50% Injectable 25 Gram(s) IV Push once  epoetin reilly (EPOGEN) Injectable 38163 Unit(s) IV Push <User Schedule>  fentaNYL   Infusion..... 1 MICROgram(s)/kG/Hr (1.05 mL/Hr) IV Continuous <Continuous>  glucagon  Injectable 1 milliGRAM(s) IntraMuscular once  heparin  Infusion.  Unit(s)/Hr (6.5 mL/Hr) IV Continuous <Continuous>  insulin lispro (ADMELOG) corrective regimen sliding scale   SubCutaneous every 6 hours  levETIRAcetam  IVPB 1000 milliGRAM(s) IV Intermittent every 12 hours  norepinephrine Infusion 0.05 MICROgram(s)/kG/Min (2.47 mL/Hr) IV Continuous <Continuous>  petrolatum Ophthalmic Ointment 1 Application(s) Both EYES two times a day  piperacillin/tazobactam IVPB.. 3.375 Gram(s) IV Intermittent every 12 hours  PrismaSATE Dialysate BK 0 / 3.5 5000 milliLiter(s) (1000 mL/Hr) CRRT <Continuous>  PrismaSOL Filtration BGK 4 / 2.5 5000 milliLiter(s) (800 mL/Hr) CRRT <Continuous>  PrismaSOL Filtration BGK 4 / 2.5 5000 milliLiter(s) (200 mL/Hr) CRRT <Continuous>  vasopressin Infusion 0.03 Unit(s)/Min (4.5 mL/Hr) IV Continuous <Continuous>      Allergies    No Known Allergies    Intolerances        SOCIAL HISTORY:  Denies ETOh,Smoking,     FAMILY HISTORY:  FHx: diabetes mellitus (Father, Aunt)        REVIEW OF SYSTEMS:    CONSTITUTIONAL: No weakness, fevers or chills  EYES/ENT: No visual changes;  No vertigo or throat pain   NECK: No pain or stiffness  RESPIRATORY: No cough, wheezing, hemoptysis; No shortness of breath  CARDIOVASCULAR: No chest pain or palpitations  GASTROINTESTINAL: No abdominal or epigastric pain. No nausea, vomiting, or hematemesis; No diarrhea or constipation. No melena or hematochezia.  GENITOURINARY: No dysuria, frequency or hematuria  NEUROLOGICAL: No numbness or weakness  SKIN: No itching, burning, rashes, or lesions   All other review of systems is negative unless indicated above.    VITAL:  T(C): , Max: 37.8 (05-04-24 @ 15:00)  T(F): , Max: 100.1 (05-04-24 @ 15:00)  HR: 43 (05-05-24 @ 09:15)  BP: --  BP(mean): --  RR: 14 (05-05-24 @ 09:15)  SpO2: 100% (05-05-24 @ 09:15)  Wt(kg): --    I and O's:    05-03 @ 07:01  -  05-04 @ 07:00  --------------------------------------------------------  IN: 991.6 mL / OUT: 607 mL / NET: 384.6 mL    05-04 @ 07:01  -  05-05 @ 07:00  --------------------------------------------------------  IN: 895.7 mL / OUT: 1550 mL / NET: -654.3 mL    05-05 @ 07:01  -  05-05 @ 09:51  --------------------------------------------------------  IN: 24.2 mL / OUT: 0 mL / NET: 24.2 mL          PHYSICAL EXAM:    Constitutional: NAD  HEENT: PERRLA,   Neck: No JVD  Respiratory: CTA B/L  Cardiovascular: S1 and S2  Gastrointestinal: BS+, soft, NT/ND  Extremities: No peripheral edema  Neurological: A/O x 3, no focal deficits  Psychiatric: Normal mood, normal affect  : No Abbasi  Skin: No rashes  Access: Not applicable  Back: No CVA tenderness    LABS:                        10.5   18.63 )-----------( 101      ( 05 May 2024 03:10 )             33.7     05-05    134<L>  |  98  |  31<H>  ----------------------------<  168<H>  5.3   |  21<L>  |  2.95<H>    Ca    8.2<L>      05 May 2024 03:10  Phos  4.7     05-05  Mg     2.50     05-05    TPro  6.6  /  Alb  3.4  /  TBili  0.7  /  DBili  x   /  AST  21  /  ALT  24  /  AlkPhos  81  05-05          RADIOLOGY & ADDITIONAL STUDIES:

## 2024-05-05 NOTE — PROGRESS NOTE ADULT - SUBJECTIVE AND OBJECTIVE BOX
Cardiovascular Disease Progress Note  DATE OF SERVICE: 24 @ 09:19    Overnight events: No acute events overnight.  The patient remains intubated and sedated.  CRRT and EEG underway.      Objective Findings:  T(C): 36.9 (24 @ 04:00), Max: 37.8 (24 @ 15:00)  HR: 48 (24 @ 09:00) (41 - 63)  BP: --  RR: 14 (24 @ 09:00) (12 - 16)  SpO2: 100% (24 @ 09:00) (97% - 100%)  Wt(kg): --  Daily     Daily Weight in k.2 (05 May 2024 00:00)      Physical Exam:  Gen: NAD; Patient resting comfortably  HEENT:  Normocephalic/ atraumatic  CV: RRR, normal S1 + S2, no m/r/g  Lungs:  Normal respiratory effort; mechanical ventilation via ETT  Abd: soft, non-tender; bowel sounds present  Ext: No edema; warm and well perfused    Telemetry: Sinus    Laboratory Data:                        10.5   18.63 )-----------( 101      ( 05 May 2024 03:10 )             33.7     05-05    134<L>  |  98  |  31<H>  ----------------------------<  168<H>  5.3   |  21<L>  |  2.95<H>    Ca    8.2<L>      05 May 2024 03:10  Phos  4.7     05-05  Mg     2.50     05-05    TPro  6.6  /  Alb  3.4  /  TBili  0.7  /  DBili  x   /  AST  21  /  ALT  24  /  AlkPhos  81  05-05    PT/INR - ( 05 May 2024 03:10 )   PT: 14.2 sec;   INR: 1.28 ratio         PTT - ( 05 May 2024 03:10 )  PTT:47.9 sec  CARDIAC MARKERS ( 04 May 2024 01:20 )  x     / x     / 155 U/L / x     / x      CARDIAC MARKERS ( 03 May 2024 21:20 )  x     / x     / 172 U/L / x     / 1.8 ng/mL          Inpatient Medications:  MEDICATIONS  (STANDING):  aspirin  chewable 81 milliGRAM(s) Oral daily  chlorhexidine 0.12% Liquid 15 milliLiter(s) Oral Mucosa every 12 hours  chlorhexidine 2% Cloths 1 Application(s) Topical <User Schedule>  CRRT Treatment    <Continuous>  dextrose 10% Bolus 125 milliLiter(s) IV Bolus once  dextrose 5%. 1000 milliLiter(s) (50 mL/Hr) IV Continuous <Continuous>  dextrose 5%. 1000 milliLiter(s) (100 mL/Hr) IV Continuous <Continuous>  dextrose 50% Injectable 12.5 Gram(s) IV Push once  dextrose 50% Injectable 25 Gram(s) IV Push once  epoetin reilly (EPOGEN) Injectable 04049 Unit(s) IV Push <User Schedule>  fentaNYL   Infusion..... 1 MICROgram(s)/kG/Hr (1.05 mL/Hr) IV Continuous <Continuous>  glucagon  Injectable 1 milliGRAM(s) IntraMuscular once  heparin  Infusion.  Unit(s)/Hr (6.5 mL/Hr) IV Continuous <Continuous>  insulin lispro (ADMELOG) corrective regimen sliding scale   SubCutaneous every 6 hours  levETIRAcetam  IVPB 1000 milliGRAM(s) IV Intermittent every 12 hours  norepinephrine Infusion 0.05 MICROgram(s)/kG/Min (2.47 mL/Hr) IV Continuous <Continuous>  petrolatum Ophthalmic Ointment 1 Application(s) Both EYES two times a day  piperacillin/tazobactam IVPB.. 3.375 Gram(s) IV Intermittent every 12 hours  PrismaSATE Dialysate BK 0 / 3.5 5000 milliLiter(s) (1000 mL/Hr) CRRT <Continuous>  PrismaSOL Filtration BGK 4 / 2.5 5000 milliLiter(s) (800 mL/Hr) CRRT <Continuous>  PrismaSOL Filtration BGK 4 / 2.5 5000 milliLiter(s) (200 mL/Hr) CRRT <Continuous>  vasopressin Infusion 0.03 Unit(s)/Min (4.5 mL/Hr) IV Continuous <Continuous>      Assessment: 73 year old man with HTN, HLD, T2DM on insulin, and ESRD on HD presents with supply demand ischemia and angina.    Plan of Care:    #Type II myocardial infarction-  Secondary to distributive shock.   ST deviation noted on EKG, however interpretation is limited by LVH with repolarization changes.  The repeat echo shows no new wall motion abnormality.   I would not pursue cath at this time given clinical instability.  Medical management of NSTEMI.  ASA 81 mg daily with heparin gtt x 48 hours while monitoring HD access site.       #Sinus bradycardia-  No evidence of AV block on admission EKG or telemetry.  No indication for pacing at this time.     #HTN-  Now in shock.  Pressor requirements improving.     #ESRD-  CRRT underway in the MICU.       Patient critically ill in the MICU             Over 55 minutes of critical care time spent on total encounter; more than 50% of the visit was spent counseling and/or coordinating care by the attending physician.      Geovani Bravo MD [facility]  Cardiovascular Disease  [phone]

## 2024-05-05 NOTE — PROGRESS NOTE ADULT - ASSESSMENT
73-year-old male past medical history hypertension, ESRD on dialysis Monday Wednesday Friday, diabetes insulin-dependent presents with hiccups patient endorses persistent hiccups for the last 2 days.   found to have peaked T waves, a new finding. denied dizziness, N/V, denies CP, palps, abd pain  Upon admission seen by CArd, renal and GI  found to have a distended GB prob 2/2 gastroparesis, was started on regaln  has received 4 doses of baclofen since 4/30 2/2 hiccups, last dose on 5/1 at 5 am  had received Haldol for agitation at 11 pm on 4/30, AMS observed in pm of 5/1  AMS ongoing, RRT x 2 called  now transferred to MICU for encephalopathy requiring airway protection airway protection on 5/2,  intubated for airway protection, on propofol and pressors.   HD was not done timely until femoral shiley was placed 2/2 clotted RUE AVF. now removed and RIJ shiley placed on 5/3  has been nonverbal since pm 5/1.     encephalopathy etiology not clear. became lethargic and unresponsive after Haldol  received few doses for intractable hiccups. toxicology consulted, not impressed AMS 2/2 Haldol nor baclofen   on pressors for septic shock w Tmax 103  remains intubated for airway protection,   on pressors, on CRRT,   on ZOsyn now afebrile  leukocytosis improving  EKG changes on 5/3 c/w NSTEMI loaded w DAPT now on Heparin drip. rpt TTE is unchanged  s/p EEG,, now loaded w KEPPRA  ID, Neuro , renal, cardiology following.  esrd, had missed HD prior to AMS 2/2 clotted RUE AVF. fistulogram when medically stable.   HD via RIght IJ Shiley.   NGT in place in stomach.   LP done, no sign of meningitis.         NEUROLOGY     - CTH without acute findings   - LP done, no acute findings  - awaiting  MRI brain and vEEG as per neuro    CARDIOVASCULAR     - ptn never had CP. just peaked T waves. was awaiting ischemic study w nucl stress test  - TTE showing EF 72%, rpt unchanged  -EKG changes on 5/3, loaded w DAPT now on Heparin drip  - on pressors    GI  - on NGT feeds while sedated, intubated  - Gastroparesis       RENAL   - CRRT in MICU  - via R IJ shiley. R fem removed  - pending fistula study of RUE when medically optimized      INFECTIOUS DISEASE   - sepsis, Tmax 103  - pan scan  of C/A/P on admission was neg  -  blood cx NTD  - on ZOsyn    ENDOCRINE   - DM  - cw ISS    GOC:  Full code

## 2024-05-05 NOTE — PROGRESS NOTE ADULT - ASSESSMENT
73-year-old male past medical history hypertension, ESRD on dialysis Monday Wednesday Friday, diabetes insulin-dependent presents with hiccups patient endorses persistent hiccups for the last 2 days.   found to have peaked T waves, a new finding. denied dizziness, N/V, denies CP, palps, abd pain  Upon admission seen by CArd, renal and GI  found to have a distended GB prob 2/2 gastroparesis, was started on regaln  has received 4 doses of baclofen since 4/30 2/2 hiccups, last dose on 5/1 at 5 am  had received Haldol for agitation at 11 pm on 4/30, AMS observed in pm of 5/1  AMS ongoing, RRT x 2 called  now transferred to MICU for encephalopathy requiring airway protection airway protection on 5/2,  intubated for airway protection, on propofol and pressors.   HD was not done timely until femoral shiley was placed 2/2 clotted RUE AVF. now removed and RIJ shiley placed on 5/3  has been nonverbal since pm 5/1.     encephalopathy etiology not clear.   remains intubated for airway protection, sedated. on pressors, ID, Neuro following. being observed off Abx. esrd, had missed HD prior to AMS 2/2 clotted RUE AVF. fistulogram when medically stable. at present HD via RIght IJ Shiley. NGT in place in stomach. LP done, no sign of meningitis.         73M w/ PMH of HTN, ESRD (MWF), IDDM, p/f chest pain c/b hiccups for the last 2 days undergoing ischemic evaluation per cardiology. Pt s/p RRT x 2 for AMS. MICU consulted and accepting for airway monitoring in setting of ? baclofen toxicity.     NEUROLOGY   #AMS  - patient with multiple RRTs for AMS  - CTH without acute findings   - may be due to baclofen toxicity vs withdrawal  - neurology w/ concerns for meningoencephalitis; LP performed w/o signs of infection    PLAN  - follow up tox and neuro recs  - obtain MRI brain and vEEG  - cw HD/CRRT for concern of baclofen toxicity       CARDIOVASCULAR     #Angina  - patient admitted with chest pain and noted to have peaked T waves on admission   - TTE showing EF 72%  - EKG 5/3 demonstrating ST deviation noted on EKG  - Per cardiology, no plan for cath at this time  - s/p DAPT load 5/4  - will c/w ASA 81 mg daily with heparin gtt x 48 hours  - repeat TTE (5/4) demonstrating no significant interval changes    #HTN  - will hold bp meds in the setting of hypotension    RESPIRATORY   - intubated due to inability to protect airway    GI/NUTRITION   #Gastroporesis   - hold reglan for now     /RENAL   #ESRD MWF  - R fem shiley removed 5/2  - placement of IJ shiley 5/3  - pending fistula study of RUE (failed previous HD session)      INFECTIOUS DISEASE   #leukocytosis  - noted on repeat labs  - follow up infectious workup  - would monitor off antibiotics for now     ENDOCRINE   #IDD  - cw ISS    HEMATOLOGY/ONCOLOGY  FELIX      DVT PPX  heparin gtt    ETHICS  Full code   73M w/ PMH of HTN, ESRD (MWF), IDDM, p/f chest pain c/b hiccups for the last 2 days undergoing ischemic evaluation per cardiology. Pt s/p RRT x 2 for AMS. MICU consulted and accepting for airway monitoring in setting of ? baclofen toxicity.     NEUROLOGY   #AMS  - patient with multiple RRTs for AMS  - CTH without acute findings   - may be due to baclofen toxicity vs withdrawal (toxicology does not seem to think so however)  - LP negative  - follow up tox and neuro recs  - cw HD/CRRT for concern of baclofen toxicity   - MRI pending   - Cont EEG, fu report with neuro   - Lessen sedation today to try to wake patient and assess mental status       CARDIOVASCULAR   #Angina  - patient admitted with chest pain and noted to have peaked T waves on admission   - TTE showing EF 72%  - EKG 5/3 demonstrating ST deviation noted on EKG  - Per cardiology, no plan for cath at this time  - s/p DAPT load 5/4  - will c/w ASA 81 mg daily with heparin gtt x 48 hours  - repeat TTE (5/4) demonstrating no significant interval changes    #HTN  - will hold bp meds in the setting of hypotension    RESPIRATORY   - intubated due to inability to protect airway    GI/NUTRITION   #Gastroporesis   - hold reglan for now     /RENAL   #ESRD MWF  - R fem shiley removed 5/2  - placement of IJ shiley 5/3  - pending fistula study of RUE (failed previous HD session)      INFECTIOUS DISEASE   #leukocytosis  - noted on repeat labs  - follow up infectious workup  - Cont zosyn for total 5 days     ENDOCRINE   #IDD  - cw ISS    HEMATOLOGY/ONCOLOGY  FELIX      DVT PPX  heparin gtt    ETHICS  Full code

## 2024-05-05 NOTE — PROGRESS NOTE ADULT - ASSESSMENT
73-year-old male past medical history hypertension, ESRD on dialysis Monday Wednesday Friday, diabetes insulin-dependent presents with hiccups patient endorses persistent hiccups for the last 2 days. Nephrology consulted for HD needs.    A/P  ESRD:  Center: [facility]  Nephrologist: Dr. Hardwick  Access: R AVF  MWF schedule  Vascular for fistulogram   s/p femoral shiley on 5/1, now removed  s/p placement of IJ shiley 5/3  Currently on CRRT.  Consent obtained and placed in ED chart  Renal diet  Monitor BMP    Hyperkalemia  s/p HD on 5/1  Stable.  C/W CRRT.  Low K Diet.  Monitor closely     HTN:  Fluctuating; low  On pressors.  Care per ICU.  Monitor BP    Anemia:  Above goal  Monitor Hb  SILVA if Hgb <11.    CKD-MBD  Check PTH  PO4 acceptable  -  if high consider calcium acetate 667mg TID.  Low PO4 diet.  Monitor Ca, PO4 daily   73-year-old male past medical history hypertension, ESRD on dialysis Monday Wednesday Friday, diabetes insulin-dependent presents with hiccups patient endorses persistent hiccups for the last 2 days. Nephrology consulted for HD needs.    A/P  ESRD:  Center: [facility]  Nephrologist: Dr. Hardwick  Access: R AVF  MWF schedule  Vascular for fistulogram   s/p femoral shiley on 5/1, now removed  s/p placement of IJ shiley 5/3  Currently on CRRT.---continue  Consent obtained and placed in ED chart  Renal diet  Monitor BMP    Hyperkalemia  s/p HD on 5/1  Stable.  C/W CRRT.  Low K Diet.  Monitor closely     HTN:  Fluctuating; low  On pressors.  Care per ICU.  Monitor BP    Anemia:  Above goal  Monitor Hb  SILVA if Hgb <11.    CKD-MBD  Check PTH  PO4 acceptable  -  if high consider calcium acetate 667mg TID.  Low PO4 diet.  Monitor Ca, PO4 daily

## 2024-05-05 NOTE — PROGRESS NOTE ADULT - SUBJECTIVE AND OBJECTIVE BOX
INTERVAL HPI/OVERNIGHT EVENTS: No acute events overnight. Keppra loaded yesterday, ASA started, still on heparin drip.     SUBJECTIVE: Patient seen and examined at bedside.     MEDICATIONS  (STANDING):  aspirin  chewable 81 milliGRAM(s) Oral daily  chlorhexidine 0.12% Liquid 15 milliLiter(s) Oral Mucosa every 12 hours  chlorhexidine 2% Cloths 1 Application(s) Topical <User Schedule>  CRRT Treatment    <Continuous>  dextrose 10% Bolus 125 milliLiter(s) IV Bolus once  dextrose 5%. 1000 milliLiter(s) (50 mL/Hr) IV Continuous <Continuous>  dextrose 5%. 1000 milliLiter(s) (100 mL/Hr) IV Continuous <Continuous>  dextrose 50% Injectable 12.5 Gram(s) IV Push once  dextrose 50% Injectable 25 Gram(s) IV Push once  epoetin reilly (EPOGEN) Injectable 40005 Unit(s) IV Push <User Schedule>  fentaNYL   Infusion..... 1 MICROgram(s)/kG/Hr (1.05 mL/Hr) IV Continuous <Continuous>  glucagon  Injectable 1 milliGRAM(s) IntraMuscular once  heparin  Infusion.  Unit(s)/Hr (6.5 mL/Hr) IV Continuous <Continuous>  insulin lispro (ADMELOG) corrective regimen sliding scale   SubCutaneous every 6 hours  levETIRAcetam  IVPB 1000 milliGRAM(s) IV Intermittent every 12 hours  norepinephrine Infusion 0.05 MICROgram(s)/kG/Min (2.47 mL/Hr) IV Continuous <Continuous>  petrolatum Ophthalmic Ointment 1 Application(s) Both EYES two times a day  piperacillin/tazobactam IVPB.. 3.375 Gram(s) IV Intermittent every 12 hours  PrismaSATE Dialysate BK 0 / 3.5 5000 milliLiter(s) (1000 mL/Hr) CRRT <Continuous>  PrismaSOL Filtration BGK 4 / 2.5 5000 milliLiter(s) (800 mL/Hr) CRRT <Continuous>  PrismaSOL Filtration BGK 4 / 2.5 5000 milliLiter(s) (200 mL/Hr) CRRT <Continuous>  vasopressin Infusion 0.03 Unit(s)/Min (4.5 mL/Hr) IV Continuous <Continuous>    MEDICATIONS  (PRN):  dextrose Oral Gel 15 Gram(s) Oral once PRN Blood Glucose LESS THAN 70 milliGRAM(s)/deciliter      ALLERGIES:  Allergies    No Known Allergies    Intolerances        VITAL SIGNS:  ICU Vital Signs Last 24 Hrs  T(C): 36.6 (05 May 2024 12:00), Max: 37.8 (04 May 2024 15:00)  T(F): 97.8 (05 May 2024 12:00), Max: 100.1 (04 May 2024 15:00)  HR: 46 (05 May 2024 13:00) (41 - 63)  BP: --  BP(mean): --  ABP: 120/44 (05 May 2024 13:00) (95/41 - 196/68)  ABP(mean): 65 (05 May 2024 13:00) (57 - 108)  RR: 14 (05 May 2024 13:00) (12 - 16)  SpO2: 100% (05 May 2024 13:00) (99% - 100%)    O2 Parameters below as of 05 May 2024 12:00  Patient On (Oxygen Delivery Method): ventilator    O2 Concentration (%): 30      Mode: AC/ CMV (Assist Control/ Continuous Mandatory Ventilation), RR (machine): 14, TV (machine): 450, FiO2: 30, PEEP: 5, ITime: 0.85, MAP: 9, PIP: 27  Plateau pressure:   P/F ratio:     05-04 @ 07:01  -  05-05 @ 07:00  --------------------------------------------------------  IN: 895.7 mL / OUT: 1550 mL / NET: -654.3 mL    05-05 @ 07:01  -  05-05 @ 13:59  --------------------------------------------------------  IN: 185.6 mL / OUT: 392 mL / NET: -206.4 mL      CAPILLARY BLOOD GLUCOSE      POCT Blood Glucose.: 244 mg/dL (05 May 2024 12:14)    ECG:    PHYSICAL EXAM:  GENERAL: NAD, lying in bed comfortably, sedated, intubated  HEAD:  Atraumatic, normocephalic  EYES: opens eyes intermittently   ENT: Moist mucous membranes  NECK: Supple, no JVD  HEART: S1, S2, regular rate and rhythm, no murmurs, rubs, or gallops  LUNGS: Unlabored respirations.  Clear to auscultation bilaterally, no crackles, wheezing, or rhonchi  ABDOMEN: Soft, nontender, nondistended, +BS  EXTREMITIES: 2+ peripheral pulses bilaterally. No clubbing, cyanosis, or edema  NERVOUS SYSTEM: sedated, intubated   SKIN: No rashes or lesions  LINES:     LABS:                        10.3   18.14 )-----------( 106      ( 05 May 2024 10:15 )             31.6     05-05    133<L>  |  99  |  26<H>  ----------------------------<  104<H>  5.1   |  21<L>  |  2.39<H>    Ca    8.2<L>      05 May 2024 10:15  Phos  3.4     05-05  Mg     2.50     05-05    TPro  6.6  /  Alb  3.2<L>  /  TBili  0.7  /  DBili  x   /  AST  31  /  ALT  24  /  AlkPhos  76  05-05    PT/INR - ( 05 May 2024 03:10 )   PT: 14.2 sec;   INR: 1.28 ratio         PTT - ( 05 May 2024 10:15 )  PTT:49.1 sec  Urinalysis Basic - ( 05 May 2024 10:15 )    Color: x / Appearance: x / SG: x / pH: x  Gluc: 104 mg/dL / Ketone: x  / Bili: x / Urobili: x   Blood: x / Protein: x / Nitrite: x   Leuk Esterase: x / RBC: x / WBC x   Sq Epi: x / Non Sq Epi: x / Bacteria: x        RADIOLOGY & ADDITIONAL TESTS:

## 2024-05-05 NOTE — PROGRESS NOTE ADULT - SUBJECTIVE AND OBJECTIVE BOX
Patient is a 73y old  Male who presents with a chief complaint of Unstable angina, abn EKG (05 May 2024 14:44)      SUBJECTIVE / OVERNIGHT EVENTS: spoke to ptn's wife and daughter. all questions answered. they are staying optimistic about his improvement, leukocytosis improving. remains intubated in the MICU, sedated, on pressors, on CRRT, on KEPPRA, NSTEMI on 5/3, now on DAPT and HEparin drip, rpt TTE is unchanged, on ZOsyn for sepsis    MEDICATIONS  (STANDING):  aspirin  chewable 81 milliGRAM(s) Oral daily  chlorhexidine 0.12% Liquid 15 milliLiter(s) Oral Mucosa every 12 hours  chlorhexidine 2% Cloths 1 Application(s) Topical <User Schedule>  CRRT Treatment    <Continuous>  dextrose 10% Bolus 125 milliLiter(s) IV Bolus once  dextrose 5%. 1000 milliLiter(s) (50 mL/Hr) IV Continuous <Continuous>  dextrose 5%. 1000 milliLiter(s) (100 mL/Hr) IV Continuous <Continuous>  dextrose 50% Injectable 12.5 Gram(s) IV Push once  dextrose 50% Injectable 25 Gram(s) IV Push once  epoetin reilly (EPOGEN) Injectable 42827 Unit(s) IV Push <User Schedule>  glucagon  Injectable 1 milliGRAM(s) IntraMuscular once  heparin  Infusion.  Unit(s)/Hr (6.5 mL/Hr) IV Continuous <Continuous>  insulin lispro (ADMELOG) corrective regimen sliding scale   SubCutaneous every 6 hours  levETIRAcetam  IVPB 1000 milliGRAM(s) IV Intermittent every 12 hours  norepinephrine Infusion 0.05 MICROgram(s)/kG/Min (2.47 mL/Hr) IV Continuous <Continuous>  petrolatum Ophthalmic Ointment 1 Application(s) Both EYES two times a day  piperacillin/tazobactam IVPB.. 3.375 Gram(s) IV Intermittent every 12 hours  PrismaSATE Dialysate BK 0 / 3.5 5000 milliLiter(s) (1000 mL/Hr) CRRT <Continuous>  PrismaSOL Filtration BGK 4 / 2.5 5000 milliLiter(s) (800 mL/Hr) CRRT <Continuous>  PrismaSOL Filtration BGK 4 / 2.5 5000 milliLiter(s) (200 mL/Hr) CRRT <Continuous>    MEDICATIONS  (PRN):  dextrose Oral Gel 15 Gram(s) Oral once PRN Blood Glucose LESS THAN 70 milliGRAM(s)/deciliter      Vital Signs Last 24 Hrs  T(F): 97.7 (05-05-24 @ 20:00), Max: 98.4 (05-05-24 @ 00:00)  HR: 58 (05-05-24 @ 22:00) (43 - 63)  BP: --  RR: 14 (05-05-24 @ 22:00) (14 - 16)  SpO2: 100% (05-05-24 @ 22:00) (100% - 100%)  Telemetry:   CAPILLARY BLOOD GLUCOSE      POCT Blood Glucose.: 125 mg/dL (05 May 2024 16:44)  POCT Blood Glucose.: 244 mg/dL (05 May 2024 12:14)  POCT Blood Glucose.: 71 mg/dL (05 May 2024 11:58)  POCT Blood Glucose.: 134 mg/dL (05 May 2024 05:01)  POCT Blood Glucose.: 162 mg/dL (04 May 2024 23:31)  POCT Blood Glucose.: 127 mg/dL (04 May 2024 23:29)    I&O's Summary    04 May 2024 07:01  -  05 May 2024 07:00  --------------------------------------------------------  IN: 895.7 mL / OUT: 1550 mL / NET: -654.3 mL    05 May 2024 07:01  -  05 May 2024 22:32  --------------------------------------------------------  IN: 540.1 mL / OUT: 1183 mL / NET: -642.9 mL        PHYSICAL EXAM:  GENERAL: NAD, well-developed  HEAD:  Atraumatic, Normocephalic  EYES: EOMI, PERRLA, conjunctiva and sclera clear  NECK: Supple, No JVD  CHEST/LUNG: Clear to auscultation bilaterally; No wheeze  HEART: Regular rate and rhythm; No murmurs, rubs, or gallops  ABDOMEN: Soft, Nontender, Nondistended; Bowel sounds present  EXTREMITIES:  2+ Peripheral Pulses, No clubbing, cyanosis, or edema  PSYCH: AAOx3  NEUROLOGY: non-focal  SKIN: No rashes or lesions    LABS:                        10.1   16.48 )-----------( 101      ( 05 May 2024 16:30 )             31.8     05-05    136  |  100  |  21  ----------------------------<  124<H>  4.4   |  20<L>  |  2.06<H>    Ca    8.7      05 May 2024 16:30  Phos  2.9     05-05  Mg     2.60     05-05    TPro  6.7  /  Alb  3.2<L>  /  TBili  0.8  /  DBili  x   /  AST  33  /  ALT  24  /  AlkPhos  73  05-05    PT/INR - ( 05 May 2024 03:10 )   PT: 14.2 sec;   INR: 1.28 ratio         PTT - ( 05 May 2024 16:30 )  PTT:49.8 sec  CARDIAC MARKERS ( 04 May 2024 01:20 )  x     / x     / 155 U/L / x     / x          Urinalysis Basic - ( 05 May 2024 16:30 )    Color: x / Appearance: x / SG: x / pH: x  Gluc: 124 mg/dL / Ketone: x  / Bili: x / Urobili: x   Blood: x / Protein: x / Nitrite: x   Leuk Esterase: x / RBC: x / WBC x   Sq Epi: x / Non Sq Epi: x / Bacteria: x        RADIOLOGY & ADDITIONAL TESTS:    Imaging Personally Reviewed:    Consultant(s) Notes Reviewed:      Care Discussed with Consultants/Other Providers:

## 2024-05-05 NOTE — CHART NOTE - NSCHARTNOTEFT_GEN_A_CORE
CRITICAL CARE NUTRITION CONSULT / FOLLOW-UP      Consult received for tube feeding.    74 y/o M w Hx of ESRD on HD, HLD, HTN, IDDM presenting with hiccups and chest pain. Found to have distended gallbladder 2/2 gastroparesis, started on Reglan.  S/p RRT for AMS & transferred to MICU for encephalopathy requiring intubation for airway protection. Pt. with unstable angina & MI, per chart.  Previously deemed severely malnourished.  Pt. remains intubated, on pressor support.  vEEG monitoring now in place.  Also receiving CRRT.    Bowel movement (5/2).  No noted vomiting/diarrhea/constipation or abdominal distention.  Was receiving Nepro via OGT @ trickle rate prior to change to NPO today (5/5).  Hence, inadequate energy intake.  If/when medically appropriate to resume enteral nutrition, suggest Nepro with eventual goal of 45mL/hr x 18hrs + Liquid Protein Supplement (LPS) to help meet increased protein requirements.        _________________Diet___________________  Diet, NPO (05-05-24 @ 11:21) [Active]      Weight:                    Height:  68" / 172.7cm          Ideal Body Weight: 154lbs /70kg  52.2kg (5/5)  52.6kg (4/29 on admit)          _____Estimated Energy Needs (based on IBW)_____  20-25 KCals/kg = 3073-1253 KCals/d  1.4-1.6g protein/kg = 98-112g protein/d        Edema: None noted  Skin: No pressure injuries          ________________________Pertinent Medications____________   MEDICATIONS  (STANDING):  aspirin  chewable 81 milliGRAM(s) Oral daily  chlorhexidine 0.12% Liquid 15 milliLiter(s) Oral Mucosa every 12 hours  chlorhexidine 2% Cloths 1 Application(s) Topical <User Schedule>  CRRT Treatment    <Continuous>  dextrose 10% Bolus 125 milliLiter(s) IV Bolus once  dextrose 5%. 1000 milliLiter(s) (50 mL/Hr) IV Continuous <Continuous>  dextrose 5%. 1000 milliLiter(s) (100 mL/Hr) IV Continuous <Continuous>  dextrose 50% Injectable 25 Gram(s) IV Push once  dextrose 50% Injectable 12.5 Gram(s) IV Push once  epoetin reilly (EPOGEN) Injectable 39872 Unit(s) IV Push <User Schedule>  fentaNYL   Infusion..... 1 MICROgram(s)/kG/Hr (1.05 mL/Hr) IV Continuous <Continuous>  glucagon  Injectable 1 milliGRAM(s) IntraMuscular once  heparin  Infusion.  Unit(s)/Hr (6.5 mL/Hr) IV Continuous <Continuous>  insulin lispro (ADMELOG) corrective regimen sliding scale   SubCutaneous every 6 hours  levETIRAcetam  IVPB 1000 milliGRAM(s) IV Intermittent every 12 hours  norepinephrine Infusion 0.05 MICROgram(s)/kG/Min (2.47 mL/Hr) IV Continuous <Continuous>  petrolatum Ophthalmic Ointment 1 Application(s) Both EYES two times a day  piperacillin/tazobactam IVPB.. 3.375 Gram(s) IV Intermittent every 12 hours  PrismaSATE Dialysate BK 0 / 3.5 5000 milliLiter(s) (1000 mL/Hr) CRRT <Continuous>  PrismaSOL Filtration BGK 4 / 2.5 5000 milliLiter(s) (800 mL/Hr) CRRT <Continuous>  PrismaSOL Filtration BGK 4 / 2.5 5000 milliLiter(s) (200 mL/Hr) CRRT <Continuous>  vasopressin Infusion 0.03 Unit(s)/Min (4.5 mL/Hr) IV Continuous <Continuous>    MEDICATIONS  (PRN):  dextrose Oral Gel 15 Gram(s) Oral once PRN Blood Glucose LESS THAN 70 milliGRAM(s)/deciliter          _________________________Pertinent Labs____________________     05-05    133<L>  |  99  |  26<H>  ----------------------------<  104<H>  5.1   |  21<L>  |  2.39<H>    Ca    8.2<L>      05 May 2024 10:15  Phos  3.4     05-05  Mg     2.50     05-05    TPro  6.6  /  Alb  3.2<L>  /  TBili  0.7  /  DBili  x   /  AST  31  /  ALT  24  /  AlkPhos  76  05-05                                                                   10.3   18.14 )-----------( 106      ( 05 May 2024 10:15 )             31.6         CAPILLARY BLOOD GLUCOSE      POCT Blood Glucose.: 244 mg/dL (05 May 2024 12:14)    POCT Blood Glucose.: 244 mg/dL (05-05-24 @ 12:14)  POCT Blood Glucose.: 71 mg/dL (05-05-24 @ 11:58)  POCT Blood Glucose.: 134 mg/dL (05-05-24 @ 05:01)  POCT Blood Glucose.: 162 mg/dL (05-04-24 @ 23:31)  POCT Blood Glucose.: 127 mg/dL (05-04-24 @ 23:29)  POCT Blood Glucose.: 174 mg/dL (05-04-24 @ 17:50)      ________NUTRITION Dx_________    Pt. remains severely malnourished       PLAN/RECOMMENDATIONS:    1) If/when appropriate to resume tube feedings, start Nepro @ 10mL/hr then increase by 10mL q 4hrs, as tolerated, to goal of 45mL/hr x 18hrs + Liquid Protein Supplement (LPS) 3 packets per day    provides:  810mL total volume  1458 KCals  66g protein (+ 45g additional protein via LPS)= 111g total protein    2) Obtain daily weights  3) Monitor GI status, electrolytes, glucose     RDN remains available and will f/u PRN.          Janet Peñaloza, MAXIMO, CDN       pager [phone] or MS Teams

## 2024-05-05 NOTE — PROGRESS NOTE ADULT - SUBJECTIVE AND OBJECTIVE BOX
[facility]   MD ELIANE BHAGAT MD ANGELA WONG, PA Venitha Krishnan, NP    TEL:  OFFICE: [phone]  From 5pm-7am Answering Service [phone]    -- RENAL FOLLOW UP NOTE ---Date of Service 05-05-24 @ 14:45    Patient is a 73y old  Male who presents with a chief complaint of Unstable angina, abn EKG (05 May 2024 09:51)      Patient seen and examined in ICU. in no apparent distress.     VITALS:  T(F): 97.8 (05-05-24 @ 12:00), Max: 100.1 (05-04-24 @ 15:00)  HR: 43 (05-05-24 @ 14:00)  BP: --  RR: 14 (05-05-24 @ 14:00)  SpO2: 100% (05-05-24 @ 14:00)  Wt(kg): --    05-04 @ 07:01  -  05-05 @ 07:00  --------------------------------------------------------  IN: 895.7 mL / OUT: 1550 mL / NET: -654.3 mL    05-05 @ 07:01  -  05-05 @ 14:45  --------------------------------------------------------  IN: 185.6 mL / OUT: 392 mL / NET: -206.4 mL          PHYSICAL EXAM:  General: NAD  Neck: No JVD  Respiratory: CTAB, no wheezes, rales or rhonchi; intubated   Cardiovascular: S1, S2, RRR  Gastrointestinal: BS+, soft, NT/ND  Extremities: No peripheral edema    Hospital Medications:   MEDICATIONS  (STANDING):  aspirin  chewable 81 milliGRAM(s) Oral daily  chlorhexidine 0.12% Liquid 15 milliLiter(s) Oral Mucosa every 12 hours  chlorhexidine 2% Cloths 1 Application(s) Topical <User Schedule>  CRRT Treatment    <Continuous>  dextrose 10% Bolus 125 milliLiter(s) IV Bolus once  dextrose 5%. 1000 milliLiter(s) (50 mL/Hr) IV Continuous <Continuous>  dextrose 5%. 1000 milliLiter(s) (100 mL/Hr) IV Continuous <Continuous>  dextrose 50% Injectable 12.5 Gram(s) IV Push once  dextrose 50% Injectable 25 Gram(s) IV Push once  epoetin reilly (EPOGEN) Injectable 36073 Unit(s) IV Push <User Schedule>  fentaNYL   Infusion..... 1 MICROgram(s)/kG/Hr (1.05 mL/Hr) IV Continuous <Continuous>  glucagon  Injectable 1 milliGRAM(s) IntraMuscular once  heparin  Infusion.  Unit(s)/Hr (6.5 mL/Hr) IV Continuous <Continuous>  insulin lispro (ADMELOG) corrective regimen sliding scale   SubCutaneous every 6 hours  levETIRAcetam  IVPB 1000 milliGRAM(s) IV Intermittent every 12 hours  norepinephrine Infusion 0.05 MICROgram(s)/kG/Min (2.47 mL/Hr) IV Continuous <Continuous>  petrolatum Ophthalmic Ointment 1 Application(s) Both EYES two times a day  piperacillin/tazobactam IVPB.. 3.375 Gram(s) IV Intermittent every 12 hours  PrismaSATE Dialysate BK 0 / 3.5 5000 milliLiter(s) (1000 mL/Hr) CRRT <Continuous>  PrismaSOL Filtration BGK 4 / 2.5 5000 milliLiter(s) (800 mL/Hr) CRRT <Continuous>  PrismaSOL Filtration BGK 4 / 2.5 5000 milliLiter(s) (200 mL/Hr) CRRT <Continuous>  vasopressin Infusion 0.03 Unit(s)/Min (4.5 mL/Hr) IV Continuous <Continuous>      LABS:  05-05    133<L>  |  99  |  26<H>  ----------------------------<  104<H>  5.1   |  21<L>  |  2.39<H>    Ca    8.2<L>      05 May 2024 10:15  Phos  3.4     05-05  Mg     2.50     05-05    TPro  6.6  /  Alb  3.2<L>  /  TBili  0.7  /  DBili      /  AST  31  /  ALT  24  /  AlkPhos  76  05-05    Creatinine Trend: 2.39 <--, 2.95 <--, 3.47 <--, 4.38 <--, 5.30 <--, 6.47 <--, 7.57 <--, 9.22 <--, 7.72 <--, 4.98 <--, 7.58 <--, 7.16 <--, 9.02 <--, 7.74 <--, 6.28 <--    Albumin: 3.2 g/dL (05-05 @ 10:15)  Phosphorus: 3.4 mg/dL (05-05 @ 10:15)  Albumin: 3.4 g/dL (05-05 @ 03:10)  Phosphorus: 4.7 mg/dL (05-05 @ 03:10)  Albumin: 3.5 g/dL (05-04 @ 21:00)  Phosphorus: 5.5 mg/dL (05-04 @ 21:00)  Albumin: 3.7 g/dL (05-04 @ 14:55)  Phosphorus: 5.4 mg/dL (05-04 @ 14:55)                              10.3   18.14 )-----------( 106      ( 05 May 2024 10:15 )             31.6     Urine Studies:  Urinalysis - [05-05-24 @ 10:15]      Color  / Appearance  / SG  / pH       Gluc 104 / Ketone   / Bili  / Urobili        Blood  / Protein  / Leuk Est  / Nitrite       RBC  / WBC  / Hyaline  / Gran  / Sq Epi  / Non Sq Epi  / Bacteria       Iron 47, TIBC --, %sat --      [05-01-24 @ 06:44]  TSH 2.79      [04-30-24 @ 06:03]    HBsAb <3.0      [05-01-24 @ 14:42]  HBcAb Nonreact      [05-01-24 @ 14:42]      RADIOLOGY & ADDITIONAL STUDIES:

## 2024-05-05 NOTE — EEG REPORT - NS EEG TEXT BOX
[facility]: [address], [place] [address], Phone: [phone]  [facility]: [address], [place] [address], Phone: [phone]  [facility]: [address] [place] [address], Phone: [phone]    Patient Name: JIMMY BLAND    Age: 73 year, : 1950  MRN #: -, Anton: -[place] [id number]  Referring Physician: SHAN CHARLES  EEG #: [id number]     Study Date: 2024   Start Time: 10:43:35 AM      End Date: 2024         End Time: 08:00:04 AM     Study Duration: 20 HR 54 MN    Study Information:    EEG Recording Technique:  The patient underwent continuous Video-EEG monitoring, using Telemetry System hardware on the XLTek Digital System. EEG and video data were stored on a computer hard drive with important events saved in digital archive files. The material was reviewed by a physician (electroencephalographer / epileptologist) on a daily basis. Kushal and seizure detection algorithms were utilized and reviewed. An EEG Technician attended to the patient, and was available throughout daytime work hours.  The epilepsy center neurologist was available in person or on call 24-hours per day.    EEG Placement and Labeling of Electrodes:  The EEG was performed utilizing 20 channel referential EEG connections (coronal over temporal over parasagittal montage) using all standard 10-20 electrode placements with EKG, with additional electrodes placed in the inferior temporal region using the modified 10-10 montage electrode placements for elective admissions, or if deemed necessary. Recording was at a sampling rate of 256 samples per second per channel. Time synchronized digital video recording was done simultaneously with EEG recording. A low light infrared camera was used for low light recording.     History:   73M PMHx HTN, ESRD, IDDM p/w hiccups and started on baclofen with concern for AMS overnight and desaturation.    Medication  INSULIN    ATORVASTATIN    HYDRALALZINE    CARVEDILOL      Interpretation:    [[[Abbreviation Key:  PDR=alpha rhythm/posterior dominant rhythm. A-P=anterior posterior.  Amplitude: ‘very low’:<20; ‘low’:20-49; ‘medium’:; ‘high’:>150uV.  Persistence for periodic/rhythmic patterns (% of epoch) ‘rare’:<1%; ‘occasional’:1-10%; ‘frequent’:10-50%; ‘abundant’:50-90%; ‘continuous’:>90%.  Persistence for sporadic discharges: ‘rare’:<1/hr; ‘occasional’:1/min-1/hr; ‘frequent’:>1/min; ‘abundant’:>1/10 sec.  RPP=rhythmic and periodic patterns; GRDA=generalized rhythmic delta activity; FIRDA=frontal intermittent GRDA; LRDA=lateralized rhythmic delta activity; TIRDA=temporal intermittent rhythmic delta activity;  LPD=PLED=lateralized periodic discharges; GPD=generalized periodic discharges; BIPDs =bilateral independent periodic discharges; Mf=multifocal; SIRPDs=stimulus induced rhythmic, periodic, or ictal appearing discharges; BIRDs=brief potentially ictal rhythmic discharges >4 Hz, lasting .5-10s; PFA (paroxysmal bursts >13 Hz or =8 Hz <10s).  Modifiers: +F=with fast component; +S=with spike component; +R=with rhythmic component.  S-B=burst suppression pattern.  Max=maximal. N1-drowsy; N2-stage II sleep; N3-slow wave sleep. SSS/BETS=small sharp spikes/benign epileptiform transients of sleep. HV=hyperventilation; PS=photic stimulation]]]      Daily EEG Visual Analysis    FINDINGS:      Background:  Symmetry: symmetric  Continuous: continuous  PDR: Absent  Reactivity: present  Voltage: normal, [defined typically between 20-150uV]  Anterior Posterior Gradient: present  Breach: absent    Background Slowing:  Generalized slowing: Continuous polymorphic delta>theta activity.  Focal slowing: none was present.    State Changes:   -Drowsiness and stage II sleep transients were not recorded.    Sporadic Epileptiform Discharges:   Frequent generalized sharp waves, periodic (generalized periodic discharges, GPDs) at 1-2 Hz, at times with triphasic morphology,    Electrographic and Electroclinical seizures:  None    Other Clinical Events:  None    Activation Procedures:   -Hyperventilation was not performed.    -Photic stimulation was performed and did not elicit any abnormalities.      Artifacts:  Intermittent myogenic and movement artifacts were noted.    ECG:  The heart rate on single channel ECG was predominantly between 50-60 BPM.    EEG Summary / Classification:  Abnormal EEG   GPDs with triphasic morphology  Moderate diffuse cerebral slowing    EEG Impression / Clinical Correlate:  GPDs with triphasic morphology can be seen in toxic-metabolic encephalopathies, and when at higher frequencies, may indicate risk of generalized seizures.  Diffuse non-specific cerebral dysfunction – moderate  No seizures recorded.  ________________________________________    Sarahi Emanuel MD  Research EEG Fellow, [facility] [place]    Abdi Dyer MD  Attending Physician, [facility] [place]  ------------------------------------  EEG Reading Room: [phone]  On Call Service After Hours: [phone]

## 2024-05-06 LAB
ALBUMIN SERPL ELPH-MCNC: 3.5 G/DL — SIGNIFICANT CHANGE UP (ref 3.3–5)
ALP SERPL-CCNC: 93 U/L — SIGNIFICANT CHANGE UP (ref 40–120)
ALT FLD-CCNC: 26 U/L — SIGNIFICANT CHANGE UP (ref 4–41)
ANION GAP SERPL CALC-SCNC: 16 MMOL/L — HIGH (ref 7–14)
AST SERPL-CCNC: 50 U/L — HIGH (ref 4–40)
BASOPHILS # BLD AUTO: 0.02 K/UL — SIGNIFICANT CHANGE UP (ref 0–0.2)
BASOPHILS NFR BLD AUTO: 0.1 % — SIGNIFICANT CHANGE UP (ref 0–2)
BILIRUB SERPL-MCNC: 0.7 MG/DL — SIGNIFICANT CHANGE UP (ref 0.2–1.2)
BLOOD GAS ARTERIAL COMPREHENSIVE RESULT: SIGNIFICANT CHANGE UP
BUN SERPL-MCNC: 20 MG/DL — SIGNIFICANT CHANGE UP (ref 7–23)
CALCIUM SERPL-MCNC: 8.8 MG/DL — SIGNIFICANT CHANGE UP (ref 8.4–10.5)
CHLORIDE SERPL-SCNC: 97 MMOL/L — LOW (ref 98–107)
CO2 SERPL-SCNC: 21 MMOL/L — LOW (ref 22–31)
CREAT SERPL-MCNC: 1.83 MG/DL — HIGH (ref 0.5–1.3)
EGFR: 39 ML/MIN/1.73M2 — LOW
EOSINOPHIL # BLD AUTO: 0.29 K/UL — SIGNIFICANT CHANGE UP (ref 0–0.5)
EOSINOPHIL NFR BLD AUTO: 1.8 % — SIGNIFICANT CHANGE UP (ref 0–6)
GLUCOSE BLDC GLUCOMTR-MCNC: 135 MG/DL — HIGH (ref 70–99)
GLUCOSE BLDC GLUCOMTR-MCNC: 148 MG/DL — HIGH (ref 70–99)
GLUCOSE BLDC GLUCOMTR-MCNC: 152 MG/DL — HIGH (ref 70–99)
GLUCOSE BLDC GLUCOMTR-MCNC: 192 MG/DL — HIGH (ref 70–99)
GLUCOSE BLDC GLUCOMTR-MCNC: 257 MG/DL — HIGH (ref 70–99)
GLUCOSE SERPL-MCNC: 147 MG/DL — HIGH (ref 70–99)
HCT VFR BLD CALC: 32.5 % — LOW (ref 39–50)
HGB BLD-MCNC: 10.5 G/DL — LOW (ref 13–17)
IANC: 14.28 K/UL — HIGH (ref 1.8–7.4)
IMM GRANULOCYTES NFR BLD AUTO: 0.5 % — SIGNIFICANT CHANGE UP (ref 0–0.9)
INR BLD: 1.1 RATIO — SIGNIFICANT CHANGE UP (ref 0.85–1.18)
LYMPHOCYTES # BLD AUTO: 0.69 K/UL — LOW (ref 1–3.3)
LYMPHOCYTES # BLD AUTO: 4.2 % — LOW (ref 13–44)
MAGNESIUM SERPL-MCNC: 2.5 MG/DL — SIGNIFICANT CHANGE UP (ref 1.6–2.6)
MCHC RBC-ENTMCNC: 20.2 PG — LOW (ref 27–34)
MCHC RBC-ENTMCNC: 32.3 GM/DL — SIGNIFICANT CHANGE UP (ref 32–36)
MCV RBC AUTO: 62.4 FL — LOW (ref 80–100)
MONOCYTES # BLD AUTO: 1.11 K/UL — HIGH (ref 0–0.9)
MONOCYTES NFR BLD AUTO: 6.7 % — SIGNIFICANT CHANGE UP (ref 2–14)
NEUTROPHILS # BLD AUTO: 14.28 K/UL — HIGH (ref 1.8–7.4)
NEUTROPHILS NFR BLD AUTO: 86.7 % — HIGH (ref 43–77)
NRBC # BLD: 0 /100 WBCS — SIGNIFICANT CHANGE UP (ref 0–0)
NRBC # FLD: 0.04 K/UL — HIGH (ref 0–0)
PHOSPHATE SERPL-MCNC: 3 MG/DL — SIGNIFICANT CHANGE UP (ref 2.5–4.5)
PLATELET # BLD AUTO: 109 K/UL — LOW (ref 150–400)
POTASSIUM SERPL-MCNC: 4.2 MMOL/L — SIGNIFICANT CHANGE UP (ref 3.5–5.3)
POTASSIUM SERPL-SCNC: 4.2 MMOL/L — SIGNIFICANT CHANGE UP (ref 3.5–5.3)
PROT SERPL-MCNC: 7 G/DL — SIGNIFICANT CHANGE UP (ref 6–8.3)
PROTHROM AB SERPL-ACNC: 12.3 SEC — SIGNIFICANT CHANGE UP (ref 9.5–13)
RBC # BLD: 5.21 M/UL — SIGNIFICANT CHANGE UP (ref 4.2–5.8)
RBC # FLD: 20.5 % — HIGH (ref 10.3–14.5)
SODIUM SERPL-SCNC: 134 MMOL/L — LOW (ref 135–145)
VDRL CSF-TITR: SIGNIFICANT CHANGE UP
WBC # BLD: 16.47 K/UL — HIGH (ref 3.8–10.5)
WBC # FLD AUTO: 16.47 K/UL — HIGH (ref 3.8–10.5)
WNV IGG CSF IA-ACNC: NEGATIVE — SIGNIFICANT CHANGE UP
WNV IGM CSF IA-ACNC: NEGATIVE — SIGNIFICANT CHANGE UP

## 2024-05-06 PROCEDURE — 95718 EEG PHYS/QHP 2-12 HR W/VEEG: CPT

## 2024-05-06 PROCEDURE — 93308 TTE F-UP OR LMTD: CPT | Mod: 26

## 2024-05-06 PROCEDURE — 76604 US EXAM CHEST: CPT | Mod: 26

## 2024-05-06 PROCEDURE — 70553 MRI BRAIN STEM W/O & W/DYE: CPT | Mod: 26

## 2024-05-06 PROCEDURE — 99291 CRITICAL CARE FIRST HOUR: CPT | Mod: GC

## 2024-05-06 RX ORDER — LEVETIRACETAM 250 MG/1
500 TABLET, FILM COATED ORAL EVERY 12 HOURS
Refills: 0 | Status: DISCONTINUED | OUTPATIENT
Start: 2024-05-06 | End: 2024-05-08

## 2024-05-06 RX ORDER — LEVETIRACETAM 250 MG/1
500 TABLET, FILM COATED ORAL EVERY 12 HOURS
Refills: 0 | Status: DISCONTINUED | OUTPATIENT
Start: 2024-05-06 | End: 2024-05-06

## 2024-05-06 RX ORDER — HEPARIN SODIUM 5000 [USP'U]/ML
5000 INJECTION INTRAVENOUS; SUBCUTANEOUS EVERY 12 HOURS
Refills: 0 | Status: DISCONTINUED | OUTPATIENT
Start: 2024-05-06 | End: 2024-05-12

## 2024-05-06 RX ADMIN — Medication 1: at 00:06

## 2024-05-06 RX ADMIN — HEPARIN SODIUM 5000 UNIT(S): 5000 INJECTION INTRAVENOUS; SUBCUTANEOUS at 05:57

## 2024-05-06 RX ADMIN — CHLORHEXIDINE GLUCONATE 15 MILLILITER(S): 213 SOLUTION TOPICAL at 05:57

## 2024-05-06 RX ADMIN — LEVETIRACETAM 400 MILLIGRAM(S): 250 TABLET, FILM COATED ORAL at 21:24

## 2024-05-06 RX ADMIN — Medication 1 APPLICATION(S): at 18:23

## 2024-05-06 RX ADMIN — Medication 81 MILLIGRAM(S): at 11:18

## 2024-05-06 RX ADMIN — Medication 1 APPLICATION(S): at 05:57

## 2024-05-06 RX ADMIN — LEVETIRACETAM 400 MILLIGRAM(S): 250 TABLET, FILM COATED ORAL at 10:02

## 2024-05-06 RX ADMIN — PIPERACILLIN AND TAZOBACTAM 25 GRAM(S): 4; .5 INJECTION, POWDER, LYOPHILIZED, FOR SOLUTION INTRAVENOUS at 11:17

## 2024-05-06 RX ADMIN — PIPERACILLIN AND TAZOBACTAM 25 GRAM(S): 4; .5 INJECTION, POWDER, LYOPHILIZED, FOR SOLUTION INTRAVENOUS at 23:42

## 2024-05-06 RX ADMIN — Medication 1: at 18:22

## 2024-05-06 RX ADMIN — HEPARIN SODIUM 5000 UNIT(S): 5000 INJECTION INTRAVENOUS; SUBCUTANEOUS at 18:23

## 2024-05-06 RX ADMIN — CHLORHEXIDINE GLUCONATE 1 APPLICATION(S): 213 SOLUTION TOPICAL at 05:58

## 2024-05-06 RX ADMIN — CHLORHEXIDINE GLUCONATE 15 MILLILITER(S): 213 SOLUTION TOPICAL at 18:22

## 2024-05-06 RX ADMIN — Medication 3: at 23:04

## 2024-05-06 NOTE — PROGRESS NOTE ADULT - ASSESSMENT
73M PMHx HTN, ESRD, IDDM p/w hiccups and started on baclofen with concern for AMS overnight and desaturation, ?cheye nascimento respirations. Labs with numerous metabolic derangements. CTH with bifrontal encephalomalacia, likley traumatic.   WBC inc significantly, now intubated. Exam limited as on propofol, also on pressors.   s/p LP for meningitis concerns however appears bland - not on antibiotics  EEG with GPDs, no seizures    AMS of unclear etiology - ? baclofen toxicity, no evidence of seizures.  would r/o stroke or other intracranial process  Intractable hiccups  hypoxic resp failure  ESRD on HD    Would prioritize MRI brain with and without contrast  d/c eeg  Keppra started for GPDs, no improvement in mental status  vent mgmt per ICU  HD per nephro  f/u remainder of CSF - neg thus far  on zosyn empirically   Dvt ppx

## 2024-05-06 NOTE — PROGRESS NOTE ADULT - SUBJECTIVE AND OBJECTIVE BOX
Progress Note    JIMMY Glasgow MD 73y (1950) Male [id number]  04-29-24 (7d)      Chief Complaint: Unstable angina, abn EKG    Subjective:  No acute events overnight. Patient seen and examined at bedside.    Review of Systems:  CONSTITUTIONAL: No fever, weight loss, or fatigue  EYES: No eye pain, visual disturbances, or discharge  ENMT:  No difficulty hearing, tinnitus, vertigo; No sinus or throat pain  NECK: No pain or stiffness  RESPIRATORY: No cough, wheezing, chills or hemoptysis; No shortness of breath  CARDIOVASCULAR: No chest pain, palpitations, dizziness, or leg swelling  GASTROINTESTINAL: No abdominal or epigastric pain. No nausea, vomiting, or hematemesis; No diarrhea or constipation. No melena or hematochezia.  GENITOURINARY: No dysuria, frequency, hematuria, or incontinence  NEUROLOGICAL: No headaches, memory loss, loss of strength, numbness, or tremors  SKIN: No itching, burning, rashes, or lesions   MUSCULOSKELETAL: No joint pain or swelling; No muscle, back, or extremity pain      PAST MEDICAL & SURGICAL HISTORY:  Diabetes [250.00]    Stage 5 chronic kidney disease not on chronic dialysis [N18.5]    Benign essential HTN [I10]    HLD (hyperlipidemia) [E78.5]    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 [N18.6]    ESRD on hemodialysis [N18.6]    No significant past surgical history [080279097]    AVF (arteriovenous fistula) [I77.0]    Refined to: Arteriovenous fistula    Arteriovenous fistula [I77.0]      aspirin  chewable 81 milliGRAM(s) Oral daily  chlorhexidine 0.12% Liquid 15 milliLiter(s) Oral Mucosa every 12 hours  chlorhexidine 2% Cloths 1 Application(s) Topical <User Schedule>  CRRT Treatment    <Continuous>  dextrose 10% Bolus 125 milliLiter(s) IV Bolus once  dextrose 5%. 1000 milliLiter(s) IV Continuous <Continuous>  dextrose 5%. 1000 milliLiter(s) IV Continuous <Continuous>  dextrose 50% Injectable 12.5 Gram(s) IV Push once  dextrose 50% Injectable 25 Gram(s) IV Push once  dextrose Oral Gel 15 Gram(s) Oral once PRN  epoetin reilly (EPOGEN) Injectable 75094 Unit(s) IV Push <User Schedule>  glucagon  Injectable 1 milliGRAM(s) IntraMuscular once  heparin   Injectable 5000 Unit(s) SubCutaneous every 12 hours  insulin lispro (ADMELOG) corrective regimen sliding scale   SubCutaneous every 6 hours  levETIRAcetam  IVPB 1000 milliGRAM(s) IV Intermittent every 12 hours  norepinephrine Infusion 0.05 MICROgram(s)/kG/Min IV Continuous <Continuous>  petrolatum Ophthalmic Ointment 1 Application(s) Both EYES two times a day  piperacillin/tazobactam IVPB.. 3.375 Gram(s) IV Intermittent every 12 hours  PrismaSATE Dialysate BK 0 / 3.5 5000 milliLiter(s) CRRT <Continuous>  PrismaSOL Filtration BGK 4 / 2.5 5000 milliLiter(s) CRRT <Continuous>  PrismaSOL Filtration BGK 4 / 2.5 5000 milliLiter(s) CRRT <Continuous>    Objective:  T(C): 36.6 (05-06-24 @ 04:00), Max: 36.7 (05-05-24 @ 08:00)  HR: 70 (05-06-24 @ 06:00) (43 - 79)  BP: --  RR: 14 (05-06-24 @ 06:00) (14 - 15)  SpO2: 100% (05-06-24 @ 06:00) (100% - 100%)    Physical exam:  GENERAL: NAD, well-developed  HEAD:  Atraumatic, Normocephalic  EYES: EOMI, PERRLA, conjunctiva and sclera clear  NECK: Supple, No JVD  CHEST/LUNG: Clear to auscultation bilaterally; No wheeze  HEART: Regular rate and rhythm; No murmurs, rubs, or gallops  ABDOMEN: Soft, Nontender, Nondistended; Bowel sounds present  EXTREMITIES:  2+ Peripheral Pulses, No clubbing, cyanosis, or edema  PSYCH: AAOx3  NEUROLOGY: non-focal  SKIN: No rashes or lesions      05-05-24 @ 07:01  -  05-06-24 @ 07:00  --------------------------------------------------------  IN: 709.6 mL / OUT: 1765 mL / NET: -1055.4 mL        CAPILLARY BLOOD GLUCOSE      (05-06 @ 00:00)                      10.5  16.47 )-----------( 109                 32.5    Neutrophils = 14.28 (86.7%)  Lymphocytes = 0.69 (4.2%)  Eosinophils = 0.29 (1.8%)  Basophils = 0.02 (0.1%)  Monocytes = 1.11 (6.7%)  Bands = --%    05-06    134<L>  |  97<L>  |  20  ----------------------------<  147<H>  4.2   |  21<L>  |  1.83<H>    Ca    8.8      06 May 2024 00:00  Phos  3.0     05-06  Mg     2.50     05-06    TPro  7.0  /  Alb  3.5  /  TBili  0.7  /  DBili  x   /  AST  50<H>  /  ALT  26  /  AlkPhos  93  05-06    ( 06 May 2024 00:00 )   PT: 12.3 sec;   INR: 1.10 ratio;       PTT:64.0 sec      RVP:      Arterial Blood Gas:  05-06 @ 00:00  7.35/45/115/25/98.9/-1.0  ABG lactate: --  Arterial Blood Gas:  05-05 @ 16:30  7.41/38/119/24/98.7/-0.4  ABG lactate: --  Arterial Blood Gas:  05-05 @ 10:15  7.40/38/156/24/98.9/-1.1  ABG lactate: --      Tox:         Urinalysis Basic - ( 06 May 2024 00:00 )    Color: x / Appearance: x / SG: x / pH: x  Gluc: 147 mg/dL / Ketone: x  / Bili: x / Urobili: x   Blood: x / Protein: x / Nitrite: x   Leuk Esterase: x / RBC: x / WBC x   Sq Epi: x / Non Sq Epi: x / Bacteria: x        WBC Trend: 16.47<--, 16.48<--, 18.14<--    Hb Trend: 10.5<--, 10.1<--, 10.3<--, 10.5<--, 11.0<--        New imaging in last 24 hours:  Consult notes reviewed: Progress Note    JIMMY Glasgow MD 73y (1950) Male [id number]  04-29-24 (7d)      Chief Complaint: Unstable angina, abn EKG    Subjective:  Intubated, unable to obtain history from patient.   Opening eyes to voice.     Review of Systems:  Unable to obtain.       PAST MEDICAL & SURGICAL HISTORY:  Diabetes [250.00]    Stage 5 chronic kidney disease not on chronic dialysis [N18.5]    Benign essential HTN [I10]    HLD (hyperlipidemia) [E78.5]    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 [N18.6]    ESRD on hemodialysis [N18.6]    No significant past surgical history [560914125]    AVF (arteriovenous fistula) [I77.0]    Refined to: Arteriovenous fistula    Arteriovenous fistula [I77.0]      aspirin  chewable 81 milliGRAM(s) Oral daily  chlorhexidine 0.12% Liquid 15 milliLiter(s) Oral Mucosa every 12 hours  chlorhexidine 2% Cloths 1 Application(s) Topical <User Schedule>  CRRT Treatment    <Continuous>  dextrose 10% Bolus 125 milliLiter(s) IV Bolus once  dextrose 5%. 1000 milliLiter(s) IV Continuous <Continuous>  dextrose 5%. 1000 milliLiter(s) IV Continuous <Continuous>  dextrose 50% Injectable 12.5 Gram(s) IV Push once  dextrose 50% Injectable 25 Gram(s) IV Push once  dextrose Oral Gel 15 Gram(s) Oral once PRN  epoetin reilly (EPOGEN) Injectable 47208 Unit(s) IV Push <User Schedule>  glucagon  Injectable 1 milliGRAM(s) IntraMuscular once  heparin   Injectable 5000 Unit(s) SubCutaneous every 12 hours  insulin lispro (ADMELOG) corrective regimen sliding scale   SubCutaneous every 6 hours  levETIRAcetam  IVPB 1000 milliGRAM(s) IV Intermittent every 12 hours  norepinephrine Infusion 0.05 MICROgram(s)/kG/Min IV Continuous <Continuous>  petrolatum Ophthalmic Ointment 1 Application(s) Both EYES two times a day  piperacillin/tazobactam IVPB.. 3.375 Gram(s) IV Intermittent every 12 hours  PrismaSATE Dialysate BK 0 / 3.5 5000 milliLiter(s) CRRT <Continuous>  PrismaSOL Filtration BGK 4 / 2.5 5000 milliLiter(s) CRRT <Continuous>  PrismaSOL Filtration BGK 4 / 2.5 5000 milliLiter(s) CRRT <Continuous>    Objective:  T(C): 36.6 (05-06-24 @ 04:00), Max: 36.7 (05-05-24 @ 08:00)  HR: 70 (05-06-24 @ 06:00) (43 - 79)  BP: --  RR: 14 (05-06-24 @ 06:00) (14 - 15)  SpO2: 100% (05-06-24 @ 06:00) (100% - 100%)    Physical exam:  GENERAL: NAD, well-developed  HEAD:  Atraumatic, Normocephalic  EYES: EOMI, PERRLA, conjunctiva and sclera clear  NECK: Supple, No JVD  CHEST/LUNG: Clear to auscultation bilaterally; No wheeze. ETT in place, on vent.   HEART: Regular rate and rhythm; No murmurs, rubs, or gallops  ABDOMEN: Soft, Nontender, Nondistended; Bowel sounds present  EXTREMITIES:  2+ Peripheral Pulses, No clubbing, cyanosis, or edema  PSYCH: Opening eyes to voice.   NEUROLOGY: No moving extremities or withdrawing to pain.   SKIN: No rashes or lesions      05-05-24 @ 07:01  -  05-06-24 @ 07:00  --------------------------------------------------------  IN: 709.6 mL / OUT: 1765 mL / NET: -1055.4 mL        CAPILLARY BLOOD GLUCOSE      (05-06 @ 00:00)                      10.5  16.47 )-----------( 109                 32.5    Neutrophils = 14.28 (86.7%)  Lymphocytes = 0.69 (4.2%)  Eosinophils = 0.29 (1.8%)  Basophils = 0.02 (0.1%)  Monocytes = 1.11 (6.7%)  Bands = --%    05-06    134<L>  |  97<L>  |  20  ----------------------------<  147<H>  4.2   |  21<L>  |  1.83<H>    Ca    8.8      06 May 2024 00:00  Phos  3.0     05-06  Mg     2.50     05-06    TPro  7.0  /  Alb  3.5  /  TBili  0.7  /  DBili  x   /  AST  50<H>  /  ALT  26  /  AlkPhos  93  05-06    ( 06 May 2024 00:00 )   PT: 12.3 sec;   INR: 1.10 ratio;       PTT:64.0 sec      RVP:      Arterial Blood Gas:  05-06 @ 00:00  7.35/45/115/25/98.9/-1.0  ABG lactate: --  Arterial Blood Gas:  05-05 @ 16:30  7.41/38/119/24/98.7/-0.4  ABG lactate: --  Arterial Blood Gas:  05-05 @ 10:15  7.40/38/156/24/98.9/-1.1  ABG lactate: --      Tox:         Urinalysis Basic - ( 06 May 2024 00:00 )    Color: x / Appearance: x / SG: x / pH: x  Gluc: 147 mg/dL / Ketone: x  / Bili: x / Urobili: x   Blood: x / Protein: x / Nitrite: x   Leuk Esterase: x / RBC: x / WBC x   Sq Epi: x / Non Sq Epi: x / Bacteria: x        WBC Trend: 16.47<--, 16.48<--, 18.14<--    Hb Trend: 10.5<--, 10.1<--, 10.3<--, 10.5<--, 11.0<--        New imaging in last 24 hours:  Consult notes reviewed:

## 2024-05-06 NOTE — EEG REPORT - NS EEG TEXT BOX
JIMMY BLAND [id number]     Study Date: 5/5/24 08:00 - 5/6/24 08:00  Duration: 24 hr  --------------------------------------------------------------------------------------------------  History:  CC/ HPI Patient is a 73y old  Male who presents with a chief complaint of Unstable angina, abn EKG (06 May 2024 08:37)    MEDICATIONS  (STANDING):  aspirin  chewable 81 milliGRAM(s) Oral daily  chlorhexidine 0.12% Liquid 15 milliLiter(s) Oral Mucosa every 12 hours  chlorhexidine 2% Cloths 1 Application(s) Topical <User Schedule>  dextrose 10% Bolus 125 milliLiter(s) IV Bolus once  dextrose 5%. 1000 milliLiter(s) (50 mL/Hr) IV Continuous <Continuous>  dextrose 5%. 1000 milliLiter(s) (100 mL/Hr) IV Continuous <Continuous>  dextrose 50% Injectable 12.5 Gram(s) IV Push once  dextrose 50% Injectable 25 Gram(s) IV Push once  epoetin reilly (EPOGEN) Injectable 24602 Unit(s) IV Push <User Schedule>  glucagon  Injectable 1 milliGRAM(s) IntraMuscular once  heparin   Injectable 5000 Unit(s) SubCutaneous every 12 hours  insulin lispro (ADMELOG) corrective regimen sliding scale   SubCutaneous every 6 hours  levETIRAcetam  IVPB 500 milliGRAM(s) IV Intermittent every 12 hours  norepinephrine Infusion 0.05 MICROgram(s)/kG/Min (2.47 mL/Hr) IV Continuous <Continuous>  petrolatum Ophthalmic Ointment 1 Application(s) Both EYES two times a day  piperacillin/tazobactam IVPB.. 3.375 Gram(s) IV Intermittent every 12 hours    --------------------------------------------------------------------------------------------------  Study Interpretation:    [[[Abbreviation Key:  PDR=alpha rhythm/posterior dominant rhythm. A-P=anterior posterior.  Amplitude: ‘very low’:<20; ‘low’:20-49; ‘medium’:; ‘high’:>150uV.  Persistence for periodic/rhythmic patterns (% of epoch) ‘rare’:<1%; ‘occasional’:1-10%; ‘frequent’:10-50%; ‘abundant’:50-90%; ‘continuous’:>90%.  Persistence for sporadic discharges: ‘rare’:<1/hr; ‘occasional’:1/min-1/hr; ‘frequent’:>1/min; ‘abundant’:>1/10 sec.  RPP=rhythmic and periodic patterns; GRDA=generalized rhythmic delta activity; FIRDA=frontal intermittent GRDA; LRDA=lateralized rhythmic delta activity; TIRDA=temporal intermittent rhythmic delta activity;  LPD=PLED=lateralized periodic discharges; GPD=generalized periodic discharges; BIPDs =bilateral independent periodic discharges; Mf=multifocal; SIRPDs=stimulus induced rhythmic, periodic, or ictal appearing discharges; BIRDs=brief potentially ictal rhythmic discharges >4 Hz, lasting .5-10s; PFA (paroxysmal bursts >13 Hz or =8 Hz <10s).  Modifiers: +F=with fast component; +S=with spike component; +R=with rhythmic component.  S-B=burst suppression pattern.  Max=maximal. N1-drowsy; N2-stage II sleep; N3-slow wave sleep. SSS/BETS=small sharp spikes/benign epileptiform transients of sleep. HV=hyperventilation; PS=photic stimulation]]]    Daily EEG Visual Analysis    FINDINGS:      Background:  The background is continuous and symmetric. The predominant background in the most wakeful state consists of diffuse polymorphic theta and delta slowing. There is no posterior dominant rhythm.   Later in recording, there is a more wakeful state with an intermittent symmetric, reactive 7-7.5-Hz PDR and continuous diffuse polymorphic theta and delta slowing.    Background Slowing:  Generalized slowing: As above  Focal slowing: None    State Changes:   Drowsiness is characterized by slowing of the background activity.  No normal stage 2 sleep.    Interictal Findings:  Generalized sharp waves with triphasic morphology: in the beginning of recording, appearing as frequent GPDs at 1-1.5 Hz, later in recording decreasing to occasional sporadic discharges at times predominant on the right or the left, rarely semi-periodic at 0.5-1 Hz.    Electrographic and Electroclinical seizures:  None    Other Clinical Events:  None    Activation Procedures:   Hyperventilation is not performed.    Photic stimulation is not performed.    Artifacts:  Frequent myogenic and movement artifacts and intermittently disconnected electrodes are present, at times limiting interpretation.    EKG:  Single-lead EKG shows regular rhythm.     EEG Classification / Summary:  Abnormal EEG in the awake, drowsy states.   Generalized sharp waves with triphasic morphology, initially appearing as frequent GPDs at 1-1.5 Hz, decreasing in prevalence later in recording.  Moderate improving to mild diffuse slowing.  No electrographic seizures are captured.     Clinical Impression:  Generalized sharp waves with triphasic morphology can be seen in toxic-metabolic encephalopathies and indicate some risk of generalized seizures, especially when at higher frequencies than in this study. These decrease in prevalence over the course of recording.  Moderate improving to mild diffuse cerebral dysfunction is nonspecific in etiology.   No seizures x2 days of recording.          -------------------------------------------------------------------------------------------------------    Christine Bustamante MD  Attending Physician, [facility]    -------------------------------------------------------------------------------------------------------    To reach EEG technologist:  Please use the pager number for the appropriate hospital or contact the .  At [facility] - Pager #: [phone]    To reach EEG-reading physician:  [facility] EEG Reading Room Phone #: [phone]  Epilepsy Answering Service after 5PM and before 8:30AM: Phone #: [phone]     JIMMY BLAND [id number]     Study Date: 5/5/24 08:00 - 5/6/24 13:28  Duration: 29 hr 25 min  --------------------------------------------------------------------------------------------------  History:  CC/ HPI Patient is a 73y old  Male who presents with a chief complaint of Unstable angina, abn EKG (06 May 2024 08:37)    MEDICATIONS  (STANDING):  aspirin  chewable 81 milliGRAM(s) Oral daily  chlorhexidine 0.12% Liquid 15 milliLiter(s) Oral Mucosa every 12 hours  chlorhexidine 2% Cloths 1 Application(s) Topical <User Schedule>  dextrose 10% Bolus 125 milliLiter(s) IV Bolus once  dextrose 5%. 1000 milliLiter(s) (50 mL/Hr) IV Continuous <Continuous>  dextrose 5%. 1000 milliLiter(s) (100 mL/Hr) IV Continuous <Continuous>  dextrose 50% Injectable 12.5 Gram(s) IV Push once  dextrose 50% Injectable 25 Gram(s) IV Push once  epoetin reilly (EPOGEN) Injectable 14136 Unit(s) IV Push <User Schedule>  glucagon  Injectable 1 milliGRAM(s) IntraMuscular once  heparin   Injectable 5000 Unit(s) SubCutaneous every 12 hours  insulin lispro (ADMELOG) corrective regimen sliding scale   SubCutaneous every 6 hours  levETIRAcetam  IVPB 500 milliGRAM(s) IV Intermittent every 12 hours  norepinephrine Infusion 0.05 MICROgram(s)/kG/Min (2.47 mL/Hr) IV Continuous <Continuous>  petrolatum Ophthalmic Ointment 1 Application(s) Both EYES two times a day  piperacillin/tazobactam IVPB.. 3.375 Gram(s) IV Intermittent every 12 hours    --------------------------------------------------------------------------------------------------  Study Interpretation:    [[[Abbreviation Key:  PDR=alpha rhythm/posterior dominant rhythm. A-P=anterior posterior.  Amplitude: ‘very low’:<20; ‘low’:20-49; ‘medium’:; ‘high’:>150uV.  Persistence for periodic/rhythmic patterns (% of epoch) ‘rare’:<1%; ‘occasional’:1-10%; ‘frequent’:10-50%; ‘abundant’:50-90%; ‘continuous’:>90%.  Persistence for sporadic discharges: ‘rare’:<1/hr; ‘occasional’:1/min-1/hr; ‘frequent’:>1/min; ‘abundant’:>1/10 sec.  RPP=rhythmic and periodic patterns; GRDA=generalized rhythmic delta activity; FIRDA=frontal intermittent GRDA; LRDA=lateralized rhythmic delta activity; TIRDA=temporal intermittent rhythmic delta activity;  LPD=PLED=lateralized periodic discharges; GPD=generalized periodic discharges; BIPDs =bilateral independent periodic discharges; Mf=multifocal; SIRPDs=stimulus induced rhythmic, periodic, or ictal appearing discharges; BIRDs=brief potentially ictal rhythmic discharges >4 Hz, lasting .5-10s; PFA (paroxysmal bursts >13 Hz or =8 Hz <10s).  Modifiers: +F=with fast component; +S=with spike component; +R=with rhythmic component.  S-B=burst suppression pattern.  Max=maximal. N1-drowsy; N2-stage II sleep; N3-slow wave sleep. SSS/BETS=small sharp spikes/benign epileptiform transients of sleep. HV=hyperventilation; PS=photic stimulation]]]    Daily EEG Visual Analysis    FINDINGS:      Background:  The background is continuous and symmetric. The predominant background in the most wakeful state consists of diffuse polymorphic theta and delta slowing. There is no posterior dominant rhythm.   Later in recording, there is at times a more wakeful state with an intermittent symmetric, reactive 7-7.5-Hz PDR and continuous diffuse polymorphic theta and delta slowing, not seen in the last several hours of recording.    Background Slowing:  Generalized slowing: As above  Focal slowing: None    State Changes:   Drowsiness is characterized by slowing of the background activity.  No normal stage 2 sleep.    Interictal Findings:  Generalized sharp waves with triphasic morphology: in the beginning of recording, appearing as frequent GPDs at 1-1.5 Hz, later in recording decreasing to occasional sporadic discharges at times predominant on the right or the left, rarely semi-periodic at 0.5-1 Hz.    Electrographic and Electroclinical seizures:  None    Other Clinical Events:  None    Activation Procedures:   Hyperventilation is not performed.    Photic stimulation is not performed.    Artifacts:  Frequent myogenic and movement artifacts and intermittently disconnected electrodes are present, at times limiting interpretation.    EKG:  Single-lead EKG shows regular rhythm.     EEG Classification / Summary:  Abnormal EEG in the awake, drowsy states.   -Generalized sharp waves with triphasic morphology, initially appearing as frequent GPDs at 1-1.5 Hz, decreasing in prevalence later in recording.  -Variable moderate to mild diffuse slowing.  -No electrographic seizures are captured.     Clinical Impression:  -Generalized sharp waves with triphasic morphology can be seen in toxic-metabolic encephalopathies and indicate some risk of generalized seizures, especially when at higher frequencies than in this study. These decrease in prevalence over the course of recording.  -Variable moderate to mild diffuse cerebral dysfunction is nonspecific in etiology.   -No seizures x2 days of recording.          -------------------------------------------------------------------------------------------------------    Christine Bustamante MD  Attending Physician, [facility]    -------------------------------------------------------------------------------------------------------    To reach EEG technologist:  Please use the pager number for the appropriate hospital or contact the .  At [facility] - Pager #: [phone]    To reach EEG-reading physician:  [facility] EEG Reading Room Phone #: [phone]  Epilepsy Answering Service after 5PM and before 8:30AM: Phone #: [phone]

## 2024-05-06 NOTE — PROGRESS NOTE ADULT - ASSESSMENT
73-year-old male past medical history hypertension, ESRD on dialysis Monday Wednesday Friday, diabetes insulin-dependent presents with hiccups patient endorses persistent hiccups for the last 2 days. Nephrology consulted for HD needs.    A/P  ESRD:  Center: [facility]  Nephrologist: Dr. Hardwick  Access: R AVF  MWF schedule  Vascular for fistulogram   s/p femoral shiley on 5/1, now removed  s/p placement of IJ shiley 5/3  Stop CRRT   Restart HD  Consent obtained and placed in ED chart  Renal diet  Monitor BMP    Hyperkalemia  s/p HD on 5/1  Stable.  Low K Diet.  Monitor closely     HTN:  Fluctuating; low  On pressors.  Care per ICU.  Monitor BP    Anemia:  At goal  Monitor Hb  SILVA if Hgb <11.    CKD-MBD  Check PTH  PO4 acceptable  -  if high consider calcium acetate 667mg TID.  Low PO4 diet.  Monitor Ca, PO4 daily   73-year-old male past medical history hypertension, ESRD on dialysis Monday Wednesday Friday, diabetes insulin-dependent presents with hiccups patient endorses persistent hiccups for the last 2 days. Nephrology consulted for HD needs.    A/P  ESRD:  Center: [facility]  Nephrologist: Dr. Hardwick  Access: R AVF  MWF schedule  Vascular for fistulogram   s/p femoral shiley on 5/1, now removed  s/p placement of IJ shiley 5/3  Stopped CRRT   Restart IHD-Likely HD tomorrow  Consent obtained and placed in ED chart  Renal diet  Monitor BMP    Hyperkalemia  s/p HD on 5/1  Stable.  Low K Diet.  Monitor closely     HTN:  Fluctuating; low  On pressors.  Care per ICU.  Monitor BP    Anemia:  At goal  Monitor Hb  SILVA if Hgb <11.    CKD-MBD  Check PTH  PO4 acceptable  -  if high consider calcium acetate 667mg TID.  Low PO4 diet.  Monitor Ca, PO4 daily

## 2024-05-06 NOTE — PROGRESS NOTE ADULT - SUBJECTIVE AND OBJECTIVE BOX
Patient is a 73y Male     Patient is a 73y old  Male who presents with a chief complaint of Unstable angina, abn EKG (06 May 2024 08:21)      HPI:  73-year-old male past medical history hypertension, ESRD on dialysis Monday Wednesday Friday, diabetes insulin-dependent presents with hiccups patient endorses persistent hiccups for the last 2 days. found to have peaked T waves, a new finding. denies dizziness, N/V, denies CP, palps, abd pain (29 Apr 2024 21:15)      PAST MEDICAL & SURGICAL HISTORY:  Diabetes      Benign essential HTN      HLD (hyperlipidemia)      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      ESRD on hemodialysis      Arteriovenous fistula          MEDICATIONS  (STANDING):  aspirin  chewable 81 milliGRAM(s) Oral daily  chlorhexidine 0.12% Liquid 15 milliLiter(s) Oral Mucosa every 12 hours  chlorhexidine 2% Cloths 1 Application(s) Topical <User Schedule>  CRRT Treatment    <Continuous>  dextrose 10% Bolus 125 milliLiter(s) IV Bolus once  dextrose 5%. 1000 milliLiter(s) (50 mL/Hr) IV Continuous <Continuous>  dextrose 5%. 1000 milliLiter(s) (100 mL/Hr) IV Continuous <Continuous>  dextrose 50% Injectable 12.5 Gram(s) IV Push once  dextrose 50% Injectable 25 Gram(s) IV Push once  epoetin reilly (EPOGEN) Injectable 13322 Unit(s) IV Push <User Schedule>  glucagon  Injectable 1 milliGRAM(s) IntraMuscular once  heparin   Injectable 5000 Unit(s) SubCutaneous every 12 hours  insulin lispro (ADMELOG) corrective regimen sliding scale   SubCutaneous every 6 hours  levETIRAcetam  IVPB 1000 milliGRAM(s) IV Intermittent every 12 hours  norepinephrine Infusion 0.05 MICROgram(s)/kG/Min (2.47 mL/Hr) IV Continuous <Continuous>  petrolatum Ophthalmic Ointment 1 Application(s) Both EYES two times a day  piperacillin/tazobactam IVPB.. 3.375 Gram(s) IV Intermittent every 12 hours  PrismaSATE Dialysate BK 0 / 3.5 5000 milliLiter(s) (1000 mL/Hr) CRRT <Continuous>  PrismaSOL Filtration BGK 4 / 2.5 5000 milliLiter(s) (800 mL/Hr) CRRT <Continuous>  PrismaSOL Filtration BGK 4 / 2.5 5000 milliLiter(s) (200 mL/Hr) CRRT <Continuous>      Allergies    No Known Allergies    Intolerances        SOCIAL HISTORY:  Denies ETOh,Smoking,     FAMILY HISTORY:  FHx: diabetes mellitus (Father, Aunt)        REVIEW OF SYSTEMS:    CONSTITUTIONAL: No weakness, fevers or chills  EYES/ENT: No visual changes;  No vertigo or throat pain   NECK: No pain or stiffness  RESPIRATORY: No cough, wheezing, hemoptysis; No shortness of breath  CARDIOVASCULAR: No chest pain or palpitations  GASTROINTESTINAL: No abdominal or epigastric pain. No nausea, vomiting, or hematemesis; No diarrhea or constipation. No melena or hematochezia.  GENITOURINARY: No dysuria, frequency or hematuria  NEUROLOGICAL: No numbness or weakness  SKIN: No itching, burning, rashes, or lesions   All other review of systems is negative unless indicated above.    VITAL:  T(C): , Max: 37.7 (05-06-24 @ 08:00)  T(F): , Max: 99.8 (05-06-24 @ 08:00)  HR: 79 (05-06-24 @ 08:24)  BP: --  BP(mean): --  RR: 19 (05-06-24 @ 08:00)  SpO2: 100% (05-06-24 @ 08:24)  Wt(kg): --    I and O's:    05-04 @ 07:01  -  05-05 @ 07:00  --------------------------------------------------------  IN: 895.7 mL / OUT: 1550 mL / NET: -654.3 mL    05-05 @ 07:01  -  05-06 @ 07:00  --------------------------------------------------------  IN: 709.6 mL / OUT: 1765 mL / NET: -1055.4 mL          PHYSICAL EXAM:    Constitutional: NAD  HEENT: PERRLA,   Neck: No JVD  Respiratory: CTA B/L  Cardiovascular: S1 and S2  Gastrointestinal: BS+, soft, NT/ND  Extremities: No peripheral edema  Neurological: A/O x 3, no focal deficits  Psychiatric: Normal mood, normal affect  : No Abbasi  Skin: No rashes  Access: Not applicable  Back: No CVA tenderness    LABS:                        10.5   16.47 )-----------( 109      ( 06 May 2024 00:00 )             32.5     05-06    134<L>  |  97<L>  |  20  ----------------------------<  147<H>  4.2   |  21<L>  |  1.83<H>    Ca    8.8      06 May 2024 00:00  Phos  3.0     05-06  Mg     2.50     05-06    TPro  7.0  /  Alb  3.5  /  TBili  0.7  /  DBili  x   /  AST  50<H>  /  ALT  26  /  AlkPhos  93  05-06          RADIOLOGY & ADDITIONAL STUDIES:

## 2024-05-06 NOTE — PROGRESS NOTE ADULT - ASSESSMENT
72 y/o M PMhx HTN, ESRD (on HD M/W/F), DM who presented with hiccups x 2 days and chest pain found to have peaked T waves. He was evaluated by cardiology and was planned for nuclear stress test. He was started on reglan for possible gastroparesis. Hospital course c/b AMS s/p RRT on 5/1 and 5/2. Vascular consulted 2/2 RUE AVF access unable to be used s/p R femoral shiley placed for emergent HD. Transferred to MICU and intubated 5/2 for airway protection.     AMS, leukocytosis  febrile on 5/3  no evidence of SSTI on exam  admission CT abd/pelvis- Moderate gastric distention. No CT evidence of mechanical obstruction or SBO  CXR- clear, no focal consolidation  TTE- no evidence of valvular disease  s/p LP 5/2, cell count not consistent w/ bacterial meningitis, CSF PCR- negative  noted IJ shiley placed for HD access    Recommendations  on empiric zosyn x 5 days  if febrile again would repeat cultures  f/u pending CSF studies  trend temps, wbc    Steven Torres M.D.  [facility], Division of Infectious Diseases  [phone]  After 5pm on weekdays and all day on weekends - please call [phone]

## 2024-05-06 NOTE — PROGRESS NOTE ADULT - SUBJECTIVE AND OBJECTIVE BOX
[facility], Division of Infectious Diseases  AUGUST Carbajal Y. Patel, S. Shah, G. Brian  [phone]  ([phone] - weekdays after 5pm and weekends)    Name: JIMMY BLAND  Age/Gender: 73y Male  MRN: [id number]    Interval History:  Notes reviewed.   remains intubated  febrile on 5/3    Allergies: No Known Allergies      Objective:  Vitals:   T(F): 99.2 (05-06-24 @ 12:00), Max: 99.8 (05-06-24 @ 08:00)  HR: 69 (05-06-24 @ 14:00) (45 - 82)  BP: 133/70 (05-06-24 @ 14:00) (99/63 - 133/70)  RR: 19 (05-06-24 @ 14:00) (14 - 19)  SpO2: 100% (05-06-24 @ 14:00) (100% - 100%)  Physical Examination:  General: no acute distress  HEENT: anicteric, ETT  Lungs: clear to auscultation anteriorly  Heart: S1, S2, normal rate. RIJ CVC  Abdomen: Soft. Nondistended.  Extremities: No LE edema.   Skin: Warm. Dry. No rash.    Laboratory Studies:  CBC:                       10.5   16.47 )-----------( 109      ( 06 May 2024 00:00 )             32.5     WBC Trend:  16.47 05-06-24 @ 00:00  16.48 05-05-24 @ 16:30  18.14 05-05-24 @ 10:15  18.63 05-05-24 @ 03:10  23.05 05-04-24 @ 21:00  24.74 05-04-24 @ 14:55  25.40 05-04-24 @ 08:55  25.36 05-04-24 @ 04:00  22.65 05-04-24 @ 01:20  17.37 05-03-24 @ 21:20  12.26 05-03-24 @ 00:30  24.85 05-02-24 @ 03:37  13.32 05-01-24 @ 22:42  9.64 05-01-24 @ 05:48  7.12 04-30-24 @ 06:03  12.39 04-29-24 @ 16:19    CMP: 05-06    134<L>  |  97<L>  |  20  ----------------------------<  147<H>  4.2   |  21<L>  |  1.83<H>    Ca    8.8      06 May 2024 00:00  Phos  3.0     05-06  Mg     2.50     05-06    TPro  7.0  /  Alb  3.5  /  TBili  0.7  /  DBili  x   /  AST  50<H>  /  ALT  26  /  AlkPhos  93  05-06      LIVER FUNCTIONS - ( 06 May 2024 00:00 )  Alb: 3.5 g/dL / Pro: 7.0 g/dL / ALK PHOS: 93 U/L / ALT: 26 U/L / AST: 50 U/L / GGT: x             Urinalysis Basic - ( 06 May 2024 00:00 )    Color: x / Appearance: x / SG: x / pH: x  Gluc: 147 mg/dL / Ketone: x  / Bili: x / Urobili: x   Blood: x / Protein: x / Nitrite: x   Leuk Esterase: x / RBC: x / WBC x   Sq Epi: x / Non Sq Epi: x / Bacteria: x      Microbiology: reviewed     Culture - Sputum (collected 05-04-24 @ 04:10)  Source: ET Tube ET Tube  Gram Stain (05-04-24 @ 13:40):    Few polymorphonuclear leukocytes seen per low power field    No Squamous epithelial cells per low power field    Numerous Gram Positive Rods seen per oil power field    Rare Gram Positive Cocci in Clusters seen per oil power field  Final Report (05-05-24 @ 17:14):    Normal Respiratory Jocelyn present    Culture - CSF with Gram Stain (collected 05-02-24 @ 12:15)  Source: .CSF CSF lumbar  Gram Stain (05-02-24 @ 13:51):    polymorphonuclear leukocytes    No organisms seen    by cytocentrifuge  Preliminary Report (05-03-24 @ 08:51):    No growth to date    Culture - Fungal, CSF (collected 05-02-24 @ 12:15)  Source: .CSF CSF lumbar  Preliminary Report (05-04-24 @ 23:03):    Culture is being performed. Fungal cultures are held for 4 weeks.    Culture - Blood (collected 05-02-24 @ 03:45)  Source: .Blood Blood-Peripheral  Preliminary Report (05-06-24 @ 10:01):    No growth at 4 days    Culture - Blood (collected 05-02-24 @ 03:15)  Source: .Blood Blood-Venous  Preliminary Report (05-06-24 @ 10:01):    No growth at 4 days      CSF PCR Result: NotDetec (05-02-24 @ 12:02)  West Nile Virus IgG - CSF: Negative (05-02-24 @ 12:02)  West Nile Virus IgM - CSF: Negative (05-02-24 @ 12:02)    Radiology: reviewed     Medications:  aspirin  chewable 81 milliGRAM(s) Oral daily  chlorhexidine 0.12% Liquid 15 milliLiter(s) Oral Mucosa every 12 hours  chlorhexidine 2% Cloths 1 Application(s) Topical <User Schedule>  dextrose 10% Bolus 125 milliLiter(s) IV Bolus once  dextrose 5%. 1000 milliLiter(s) IV Continuous <Continuous>  dextrose 5%. 1000 milliLiter(s) IV Continuous <Continuous>  dextrose 50% Injectable 12.5 Gram(s) IV Push once  dextrose 50% Injectable 25 Gram(s) IV Push once  dextrose Oral Gel 15 Gram(s) Oral once PRN  epoetin reilly (EPOGEN) Injectable 42386 Unit(s) IV Push <User Schedule>  glucagon  Injectable 1 milliGRAM(s) IntraMuscular once  heparin   Injectable 5000 Unit(s) SubCutaneous every 12 hours  insulin lispro (ADMELOG) corrective regimen sliding scale   SubCutaneous every 6 hours  levETIRAcetam  IVPB 500 milliGRAM(s) IV Intermittent every 12 hours  norepinephrine Infusion 0.05 MICROgram(s)/kG/Min IV Continuous <Continuous>  petrolatum Ophthalmic Ointment 1 Application(s) Both EYES two times a day  piperacillin/tazobactam IVPB.. 3.375 Gram(s) IV Intermittent every 12 hours    Antimicrobials:  piperacillin/tazobactam IVPB.. 3.375 Gram(s) IV Intermittent every 12 hours

## 2024-05-06 NOTE — PROGRESS NOTE ADULT - SUBJECTIVE AND OBJECTIVE BOX
Neurology Progress Note    S: Patient seen and examined.     Medications: MEDICATIONS  (STANDING):  aspirin  chewable 81 milliGRAM(s) Oral daily  chlorhexidine 0.12% Liquid 15 milliLiter(s) Oral Mucosa every 12 hours  chlorhexidine 2% Cloths 1 Application(s) Topical <User Schedule>  dextrose 10% Bolus 125 milliLiter(s) IV Bolus once  dextrose 5%. 1000 milliLiter(s) (50 mL/Hr) IV Continuous <Continuous>  dextrose 5%. 1000 milliLiter(s) (100 mL/Hr) IV Continuous <Continuous>  dextrose 50% Injectable 12.5 Gram(s) IV Push once  dextrose 50% Injectable 25 Gram(s) IV Push once  epoetin reilly (EPOGEN) Injectable 31091 Unit(s) IV Push <User Schedule>  glucagon  Injectable 1 milliGRAM(s) IntraMuscular once  heparin   Injectable 5000 Unit(s) SubCutaneous every 12 hours  insulin lispro (ADMELOG) corrective regimen sliding scale   SubCutaneous every 6 hours  levETIRAcetam  IVPB 500 milliGRAM(s) IV Intermittent every 12 hours  norepinephrine Infusion 0.05 MICROgram(s)/kG/Min (2.47 mL/Hr) IV Continuous <Continuous>  petrolatum Ophthalmic Ointment 1 Application(s) Both EYES two times a day  piperacillin/tazobactam IVPB.. 3.375 Gram(s) IV Intermittent every 12 hours    MEDICATIONS  (PRN):  dextrose Oral Gel 15 Gram(s) Oral once PRN Blood Glucose LESS THAN 70 milliGRAM(s)/deciliter       Vitals:  Vital Signs Last 24 Hrs  T(C): 37.8 (06 May 2024 16:00), Max: 37.8 (06 May 2024 16:00)  T(F): 100 (06 May 2024 16:00), Max: 100 (06 May 2024 16:00)  HR: 67 (06 May 2024 16:00) (47 - 82)  BP: 91/58 (06 May 2024 16:00) (91/58 - 133/70)  BP(mean): 68 (06 May 2024 16:00) (68 - 86)  RR: 14 (06 May 2024 16:00) (14 - 19)  SpO2: 100% (06 May 2024 16:00) (100% - 100%)    Parameters below as of 06 May 2024 16:00  Patient On (Oxygen Delivery Method): ventilator    O2 Concentration (%): 30      General Exam:   General Appearance: intubated, not sedasted    Head: Normocephalic, atraumatic and no dysmorphic features  Ear, Nose, and Throat: Moist mucous membranes  CVS: S1S2+  Resp: mechanical  Abd: soft NTND  Extremities: No edema, no cyanosis  Skin: No bruises, no rashes     Neurological Exam:  Mental Status: intubated, not sedated. Opens eyes to voice, does not track or FC  Cranial Nerves: pupils 1-2mm, no facial asymmetry  Motor: dec tone througout, no spontaneous movement  Sensation: no Wd to noxious     I personally reviewed the below data/images/labs:      LABS:                          10.5   16.47 )-----------( 109      ( 06 May 2024 00:00 )             32.5     05-06    134<L>  |  97<L>  |  20  ----------------------------<  147<H>  4.2   |  21<L>  |  1.83<H>    Ca    8.8      06 May 2024 00:00  Phos  3.0     05-06  Mg     2.50     05-06    TPro  7.0  /  Alb  3.5  /  TBili  0.7  /  DBili  x   /  AST  50<H>  /  ALT  26  /  AlkPhos  93  05-06    LIVER FUNCTIONS - ( 06 May 2024 00:00 )  Alb: 3.5 g/dL / Pro: 7.0 g/dL / ALK PHOS: 93 U/L / ALT: 26 U/L / AST: 50 U/L / GGT: x           PT/INR - ( 06 May 2024 00:00 )   PT: 12.3 sec;   INR: 1.10 ratio         PTT - ( 05 May 2024 23:00 )  PTT:64.0 sec  Urinalysis Basic - ( 06 May 2024 00:00 )    Color: x / Appearance: x / SG: x / pH: x  Gluc: 147 mg/dL / Ketone: x  / Bili: x / Urobili: x   Blood: x / Protein: x / Nitrite: x   Leuk Esterase: x / RBC: x / WBC x   Sq Epi: x / Non Sq Epi: x / Bacteria: x        CT Head No Cont:  (01 May 2024 18:11)  < from: CT Head No Cont (05.01.24 @ 18:11) >    ACC: [id number] EXAM:  CT BRAIN   ORDERED BY: SHELBY MOREL     PROCEDURE DATE:  05/01/2024          INTERPRETATION:  CT OF THE HEAD WITHOUT CONTRAST    CLINICAL INDICATION: Lethargy.    TECHNIQUE: Volumetric CT acquisition was performed through the brain and   reviewed using brain and bone window technique.      COMPARISON: CT head from 1/17/2024    FINDINGS:    The ventricular and sulcal size and configuration is age appropriate.     There is no acute loss of gray-white differentiation. Stable bilateral   inferior frontal encephalomalacia and gliosis likely related to remote   trauma.    There is no evidence of mass effect, midline shift, acute intracranial   hemorrhage, or extra-axial collections.     The calvarium is intact. The paranasalsinuses are clear.The mastoid air   cells are predominantly clear. The orbits are unremarkable.      IMPRESSION:  No acute intracranial hemorrhage or acute territorial infarction. Chronic   findings as above.    --- End of Report ---          GILDARDO KHAN; Resident Radiologist  This document has been electronically signed.  LADARIUS DENNY MD; Attending Radiologist  This document has been electronically signed. May  1 2024  7:54PM    < end of copied text >      EEG Classification / Summary:  Abnormal EEG in the awake, drowsy states.   -Generalized sharp waves with triphasic morphology, initially appearing as frequent GPDs at 1-1.5 Hz, decreasing in prevalence later in recording.  -Variable moderate to mild diffuse slowing.  -No electrographic seizures are captured.     Clinical Impression:  -Generalized sharp waves with triphasic morphology can be seen in toxic-metabolic encephalopathies and indicate some risk of generalized seizures, especially when at higher frequencies than in this study. These decrease in prevalence over the course of recording.  -Variable moderate to mild diffuse cerebral dysfunction is nonspecific in etiology.   -No seizures x2 days of recording.

## 2024-05-06 NOTE — PROGRESS NOTE ADULT - SUBJECTIVE AND OBJECTIVE BOX
[facility] [place] [facility]   MD ELIANE BHAGAT MD MARIA SANTIAGO, NP    TEL:  OFFICE: [phone]  From 5pm-7am Answering Service [phone]    -- RENAL FOLLOW UP NOTE ---Date of Service 05-06-24 @ 16:38    Patient is a 73y old  Male who presents with a chief complaint of Unstable angina, abn EKG       Patient seen and examined in ICU. Patient intubated in no apparent distress.    VITALS:  T(F): 100 (05-06-24 @ 16:00), Max: 100 (05-06-24 @ 16:00)  HR: 67 (05-06-24 @ 16:00)  BP: 91/58 (05-06-24 @ 16:00)  RR: 14 (05-06-24 @ 16:00)  SpO2: 100% (05-06-24 @ 16:00)  Wt(kg): --    05-05 @ 07:01  -  05-06 @ 07:00  --------------------------------------------------------  IN: 709.6 mL / OUT: 1765 mL / NET: -1055.4 mL    05-06 @ 07:01  -  05-06 @ 16:38  --------------------------------------------------------  IN: 155 mL / OUT: 0 mL / NET: 155 mL          PHYSICAL EXAM:  General: NAD  Neck: No JVD  Respiratory: intubated  Cardiovascular: S1, S2, RRR  Gastrointestinal: BS+, soft, NT/ND  Extremities: No peripheral edema    Hospital Medications:   MEDICATIONS  (STANDING):  aspirin  chewable 81 milliGRAM(s) Oral daily  chlorhexidine 0.12% Liquid 15 milliLiter(s) Oral Mucosa every 12 hours  chlorhexidine 2% Cloths 1 Application(s) Topical <User Schedule>  dextrose 10% Bolus 125 milliLiter(s) IV Bolus once  dextrose 5%. 1000 milliLiter(s) (50 mL/Hr) IV Continuous <Continuous>  dextrose 5%. 1000 milliLiter(s) (100 mL/Hr) IV Continuous <Continuous>  dextrose 50% Injectable 25 Gram(s) IV Push once  dextrose 50% Injectable 12.5 Gram(s) IV Push once  epoetin reilly (EPOGEN) Injectable 86362 Unit(s) IV Push <User Schedule>  glucagon  Injectable 1 milliGRAM(s) IntraMuscular once  heparin   Injectable 5000 Unit(s) SubCutaneous every 12 hours  insulin lispro (ADMELOG) corrective regimen sliding scale   SubCutaneous every 6 hours  levETIRAcetam  IVPB 500 milliGRAM(s) IV Intermittent every 12 hours  norepinephrine Infusion 0.05 MICROgram(s)/kG/Min (2.47 mL/Hr) IV Continuous <Continuous>  petrolatum Ophthalmic Ointment 1 Application(s) Both EYES two times a day  piperacillin/tazobactam IVPB.. 3.375 Gram(s) IV Intermittent every 12 hours      LABS:  05-06    134<L>  |  97<L>  |  20  ----------------------------<  147<H>  4.2   |  21<L>  |  1.83<H>    Ca    8.8      06 May 2024 00:00  Phos  3.0     05-06  Mg     2.50     05-06    TPro  7.0  /  Alb  3.5  /  TBili  0.7  /  DBili      /  AST  50<H>  /  ALT  26  /  AlkPhos  93  05-06    Creatinine Trend: 1.83 <--, 2.06 <--, 2.39 <--, 2.95 <--, 3.47 <--, 4.38 <--, 5.30 <--, 6.47 <--, 7.57 <--, 9.22 <--, 7.72 <--, 4.98 <--, 7.58 <--, 7.16 <--, 9.02 <--, 7.74 <--    Phosphorus: 3.0 mg/dL (05-06 @ 00:00)  Albumin: 3.5 g/dL (05-06 @ 00:00)                              10.5   16.47 )-----------( 109      ( 06 May 2024 00:00 )             32.5     Urine Studies:  Urinalysis - [05-06-24 @ 00:00]      Color  / Appearance  / SG  / pH       Gluc 147 / Ketone   / Bili  / Urobili        Blood  / Protein  / Leuk Est  / Nitrite       RBC  / WBC  / Hyaline  / Gran  / Sq Epi  / Non Sq Epi  / Bacteria       Iron 47, TIBC --, %sat --      [05-01-24 @ 06:44]  TSH 2.79      [04-30-24 @ 06:03]    HBsAb <3.0      [05-01-24 @ 14:42]  HBcAb Nonreact      [05-01-24 @ 14:42]      RADIOLOGY & ADDITIONAL STUDIES:

## 2024-05-06 NOTE — CHART NOTE - NSCHARTNOTEFT_GEN_A_CORE
Left IJ central line and L radial arterial lines were removed. Patient placed in Trendelenburg positioning. Gauze held to sites until hemostasis achieved. Sterile gauze and dressing applied to site.

## 2024-05-06 NOTE — PROGRESS NOTE ADULT - SUBJECTIVE AND OBJECTIVE BOX
Cardiovascular Disease Progress Note  DATE OF SERVICE: 24 @ 08:21    Overnight events: No acute events overnight.    The patient remains intubated.   Otherwise review of systems negative    Objective Findings:  T(C): 37.7 (24 @ 08:00), Max: 37.7 (24 @ 08:00)  HR: 82 (24 @ 08:00) (43 - 82)  BP: --  RR: 19 (24 @ 08:00) (14 - 19)  SpO2: 100% (24 @ 08:00) (100% - 100%)  Wt(kg): --  Daily     Daily Weight in k (06 May 2024 00:00)      Physical Exam:  Gen: NAD; Patient resting comfortably  HEENT:  Normocephalic/ atraumatic  CV: RRR, normal S1 + S2, no m/r/g  Lungs:  Normal respiratory effort; mechanical ventilation via ETT  Abd: soft, non-tender; bowel sounds present  Ext: No edema; warm and well perfused    Telemetry: Sinus; no ectopy    Laboratory Data:                        10.5   16.47 )-----------( 109      ( 06 May 2024 00:00 )             32.5         134<L>  |  97<L>  |  20  ----------------------------<  147<H>  4.2   |  21<L>  |  1.83<H>    Ca    8.8      06 May 2024 00:00  Phos  3.0       Mg     2.50         TPro  7.0  /  Alb  3.5  /  TBili  0.7  /  DBili  x   /  AST  50<H>  /  ALT  26  /  AlkPhos  93  -    PT/INR - ( 06 May 2024 00:00 )   PT: 12.3 sec;   INR: 1.10 ratio         PTT - ( 05 May 2024 23:00 )  PTT:64.0 sec          Inpatient Medications:  MEDICATIONS  (STANDING):  aspirin  chewable 81 milliGRAM(s) Oral daily  chlorhexidine 0.12% Liquid 15 milliLiter(s) Oral Mucosa every 12 hours  chlorhexidine 2% Cloths 1 Application(s) Topical <User Schedule>  CRRT Treatment    <Continuous>  dextrose 10% Bolus 125 milliLiter(s) IV Bolus once  dextrose 5%. 1000 milliLiter(s) (50 mL/Hr) IV Continuous <Continuous>  dextrose 5%. 1000 milliLiter(s) (100 mL/Hr) IV Continuous <Continuous>  dextrose 50% Injectable 12.5 Gram(s) IV Push once  dextrose 50% Injectable 25 Gram(s) IV Push once  epoetin reilly (EPOGEN) Injectable 52544 Unit(s) IV Push <User Schedule>  glucagon  Injectable 1 milliGRAM(s) IntraMuscular once  heparin   Injectable 5000 Unit(s) SubCutaneous every 12 hours  insulin lispro (ADMELOG) corrective regimen sliding scale   SubCutaneous every 6 hours  levETIRAcetam  IVPB 1000 milliGRAM(s) IV Intermittent every 12 hours  norepinephrine Infusion 0.05 MICROgram(s)/kG/Min (2.47 mL/Hr) IV Continuous <Continuous>  petrolatum Ophthalmic Ointment 1 Application(s) Both EYES two times a day  piperacillin/tazobactam IVPB.. 3.375 Gram(s) IV Intermittent every 12 hours  PrismaSATE Dialysate BK 0 / 3.5 5000 milliLiter(s) (1000 mL/Hr) CRRT <Continuous>  PrismaSOL Filtration BGK 4 / 2.5 5000 milliLiter(s) (800 mL/Hr) CRRT <Continuous>  PrismaSOL Filtration BGK 4 / 2.5 5000 milliLiter(s) (200 mL/Hr) CRRT <Continuous>      Assessment: 73 year old man with HTN, HLD, T2DM on insulin, and ESRD on HD presents with supply demand ischemia and angina.    Plan of Care:    #Type II myocardial infarction-  Secondary to distributive shock.   ST deviation noted on EKG, however interpretation is limited by LVH with repolarization changes.  The repeat echo shows no new wall motion abnormality.   I would not pursue cath at this time given clinical instability.  Medical management of NSTEMI.  ASA 81 mg daily         #Sinus bradycardia-  No evidence of AV block on admission EKG or telemetry.  No indication for pacing at this time.     #HTN-  Recent shock.  Pressors now off    #ESRD-  CRRT   in the MICU.       Patient critically ill in the MICU          Over 46 minutes of critical care time spent on total encounter; more than 50% of the visit was spent counseling and/or coordinating care by the attending physician.      Geovani Bravo MD [facility]  Cardiovascular Disease  [phone]

## 2024-05-06 NOTE — CHART NOTE - NSCHARTNOTEFT_GEN_A_CORE
: Nila Gibbs PA-C    PROCEDURE:  [X] LIMITED ECHO  [X] LIMITED CHEST  [ ] LIMITED RETROPERITONEAL  [ ] LIMITED ABDOMINAL  [ ] LIMITED DVT  [ ] NEEDLE GUIDANCE VASCULAR  [ ] NEEDLE GUIDANCE THORACENTESIS  [ ] NEEDLE GUIDANCE PARACENTESIS  [ ] NEEDLE GUIDANCE PERICARDIOCENTESIS  [ ] OTHER    FINDINGS:    Normal LV and RV systolic function noted. No pericardial effusion. IVC 1.41 cm. A-lines to anterior chest wall bilaterally. No significant pleural effusions noted.    INTERPRETATION:    Grossly normal cardiac function with normal aeration pattern of lungs. : Nila Gibbs PA-C    Indication: acute hypoxemic respiratory failure    PROCEDURE:  [X] LIMITED ECHO  [X] LIMITED CHEST  [ ] LIMITED RETROPERITONEAL  [ ] LIMITED ABDOMINAL  [ ] LIMITED DVT  [ ] NEEDLE GUIDANCE VASCULAR  [ ] NEEDLE GUIDANCE THORACENTESIS  [ ] NEEDLE GUIDANCE PARACENTESIS  [ ] NEEDLE GUIDANCE PERICARDIOCENTESIS  [ ] OTHER    FINDINGS:    Normal LV and RV systolic function noted. No pericardial effusion. IVC 1.41 cm. A-lines to anterior chest wall bilaterally. No significant pleural effusions noted.    INTERPRETATION:    Grossly normal cardiac function with normal aeration pattern of lungs.    Attending Attestation:  I was present during the key portions of the procedure and immediately available during the entire procedure.  Bonnie Bhakta MD  Attending  Pulmonary & Critical Care Medicine

## 2024-05-06 NOTE — PROGRESS NOTE ADULT - SUBJECTIVE AND OBJECTIVE BOX
Patient is a 73y old  Male who presents with a chief complaint of Unstable angina, abn EKG (06 May 2024 16:38)      SUBJECTIVE / OVERNIGHT EVENTS: MR brain c/w L talya and L cerebellar acute infarcts, no hemorrhage . no SZ focus on EEG, off CRRT, will resume HD as per renal. on pressors, intubated, on KEPPRA, completing 5 days of empiric ZOSYN on 5/7    MEDICATIONS  (STANDING):  aspirin  chewable 81 milliGRAM(s) Oral daily  chlorhexidine 0.12% Liquid 15 milliLiter(s) Oral Mucosa every 12 hours  chlorhexidine 2% Cloths 1 Application(s) Topical <User Schedule>  dextrose 10% Bolus 125 milliLiter(s) IV Bolus once  dextrose 5%. 1000 milliLiter(s) (50 mL/Hr) IV Continuous <Continuous>  dextrose 5%. 1000 milliLiter(s) (100 mL/Hr) IV Continuous <Continuous>  dextrose 50% Injectable 12.5 Gram(s) IV Push once  dextrose 50% Injectable 25 Gram(s) IV Push once  epoetin reilly (EPOGEN) Injectable 74102 Unit(s) IV Push <User Schedule>  glucagon  Injectable 1 milliGRAM(s) IntraMuscular once  heparin   Injectable 5000 Unit(s) SubCutaneous every 12 hours  insulin lispro (ADMELOG) corrective regimen sliding scale   SubCutaneous every 6 hours  levETIRAcetam  IVPB 500 milliGRAM(s) IV Intermittent every 12 hours  norepinephrine Infusion 0.05 MICROgram(s)/kG/Min (2.47 mL/Hr) IV Continuous <Continuous>  petrolatum Ophthalmic Ointment 1 Application(s) Both EYES two times a day  piperacillin/tazobactam IVPB.. 3.375 Gram(s) IV Intermittent every 12 hours    MEDICATIONS  (PRN):  dextrose Oral Gel 15 Gram(s) Oral once PRN Blood Glucose LESS THAN 70 milliGRAM(s)/deciliter      Vital Signs Last 24 Hrs  T(F): 100 (05-06-24 @ 16:00), Max: 100 (05-06-24 @ 16:00)  HR: 66 (05-06-24 @ 20:00) (58 - 82)  BP: 119/65 (05-06-24 @ 18:00) (91/58 - 133/70)  RR: 14 (05-06-24 @ 18:00) (14 - 20)  SpO2: 100% (05-06-24 @ 20:00) (98% - 100%)  Telemetry:   CAPILLARY BLOOD GLUCOSE      POCT Blood Glucose.: 192 mg/dL (06 May 2024 18:06)  POCT Blood Glucose.: 135 mg/dL (06 May 2024 11:23)  POCT Blood Glucose.: 148 mg/dL (06 May 2024 05:56)  POCT Blood Glucose.: 152 mg/dL (06 May 2024 00:00)    I&O's Summary    05 May 2024 07:01  -  06 May 2024 07:00  --------------------------------------------------------  IN: 709.6 mL / OUT: 1765 mL / NET: -1055.4 mL    06 May 2024 07:01  -  06 May 2024 20:15  --------------------------------------------------------  IN: 175 mL / OUT: 0 mL / NET: 175 mL        PHYSICAL EXAM:  GENERAL: NAD, well-developed  HEAD:  Atraumatic, Normocephalic  EYES: EOMI, PERRLA, conjunctiva and sclera clear  NECK: Supple, No JVD  CHEST/LUNG: Clear to auscultation bilaterally; No wheeze  HEART: Regular rate and rhythm; No murmurs, rubs, or gallops  ABDOMEN: Soft, Nontender, Nondistended; Bowel sounds present  EXTREMITIES:  2+ Peripheral Pulses, No clubbing, cyanosis, or edema  PSYCH: AAOx3  NEUROLOGY: non-focal  SKIN: No rashes or lesions    LABS:                        10.5   16.47 )-----------( 109      ( 06 May 2024 00:00 )             32.5     05-06    134<L>  |  97<L>  |  20  ----------------------------<  147<H>  4.2   |  21<L>  |  1.83<H>    Ca    8.8      06 May 2024 00:00  Phos  3.0     05-06  Mg     2.50     05-06    TPro  7.0  /  Alb  3.5  /  TBili  0.7  /  DBili  x   /  AST  50<H>  /  ALT  26  /  AlkPhos  93  05-06    PT/INR - ( 06 May 2024 00:00 )   PT: 12.3 sec;   INR: 1.10 ratio         PTT - ( 05 May 2024 23:00 )  PTT:64.0 sec      Urinalysis Basic - ( 06 May 2024 00:00 )    Color: x / Appearance: x / SG: x / pH: x  Gluc: 147 mg/dL / Ketone: x  / Bili: x / Urobili: x   Blood: x / Protein: x / Nitrite: x   Leuk Esterase: x / RBC: x / WBC x   Sq Epi: x / Non Sq Epi: x / Bacteria: x        RADIOLOGY & ADDITIONAL TESTS:    Imaging Personally Reviewed:    Consultant(s) Notes Reviewed:      Care Discussed with Consultants/Other Providers:

## 2024-05-06 NOTE — PROGRESS NOTE ADULT - ASSESSMENT
73-year-old male past medical history hypertension, ESRD on dialysis Monday Wednesday Friday, diabetes insulin-dependent presents with hiccups patient endorses persistent hiccups for the last 2 days.   found to have peaked T waves, a new finding. denied dizziness, N/V, denies CP, palps, abd pain  Upon admission seen by CArd, renal and GI  found to have a distended GB prob 2/2 gastroparesis, was started on regaln  has received 4 doses of baclofen since 4/30 2/2 hiccups, last dose on 5/1 at 5 am  had received Haldol for agitation at 11 pm on 4/30, AMS observed in pm of 5/1  AMS ongoing, RRT x 2 called  now transferred to MICU for encephalopathy requiring airway protection airway protection on 5/2,  intubated for airway protection, on propofol and pressors.   HD was not done timely until femoral shiley was placed 2/2 clotted RUE AVF. now removed and RIJ shiley placed on 5/3  has been nonverbal since pm 5/1.     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   encephalopathy probably 2/2 acute CVA  no SZ focus on EEG,   off CRRT, will resume HD as per renal.   on pressors, intubated, on KEPPRA, completing 5 days of empiric ZOSYN on 5/7  toxicology consulted upon dx of encephalopathy, not impressed AMS 2/2 Haldol nor baclofen   on pressors for septic shock w Tmax 103, now afebrile  remains intubated for airway protection,   leukocytosis improving  EKG changes on 5/3 c/w NSTEMI loaded w DAPT , was  on Heparin drip x 48 hrs. rpt TTE is unchanged  ID, Neuro , renal, cardiology following.  esrd, had missed HD prior to AMS 2/2 clotted RUE AVF. fistulogram when medically stable.   HD via RIght IJ Shiley.   NGT in place in stomach.   LP done, no sign of meningitis.         NEUROLOGY     - CTH without acute findings   - LP done, no acute findings  - MR brain w acute infarcts: L talya and L cerebellum, nonhemorrhagic  - no Sz focus on EEG    CARDIOVASCULAR     - ptn never had CP. just peaked T waves. was awaiting ischemic study w nucl stress test  - TTE showing EF 72%, rpt unchanged  -EKG changes on 5/3, loaded w DAPT now on Heparin drip  - on pressors    GI  - on NGT feeds while sedated, intubated  - Gastroparesis       RENAL   - s/p CRRT in MICU, now off, willr esume HD as per renal  - via R IJ kolby. R fem removed  - pending fistula study of RUE when medically optimized      INFECTIOUS DISEASE   - sepsis, Tmax 103  - pan scan  of C/A/P on admission was neg  -  blood cx NTD  - on ZOsyn    ENDOCRINE   - DM  - cw ISS    GOC:  Full code

## 2024-05-07 LAB
ALBUMIN SERPL ELPH-MCNC: 3.1 G/DL — LOW (ref 3.3–5)
ALP SERPL-CCNC: 87 U/L — SIGNIFICANT CHANGE UP (ref 40–120)
ALT FLD-CCNC: 32 U/L — SIGNIFICANT CHANGE UP (ref 4–41)
ANION GAP SERPL CALC-SCNC: 19 MMOL/L — HIGH (ref 7–14)
AST SERPL-CCNC: 44 U/L — HIGH (ref 4–40)
BASOPHILS # BLD AUTO: 0.02 K/UL — SIGNIFICANT CHANGE UP (ref 0–0.2)
BASOPHILS NFR BLD AUTO: 0.2 % — SIGNIFICANT CHANGE UP (ref 0–2)
BILIRUB SERPL-MCNC: 0.6 MG/DL — SIGNIFICANT CHANGE UP (ref 0.2–1.2)
BLOOD GAS VENOUS COMPREHENSIVE RESULT: SIGNIFICANT CHANGE UP
BUN SERPL-MCNC: 46 MG/DL — HIGH (ref 7–23)
CALCIUM SERPL-MCNC: 8.3 MG/DL — LOW (ref 8.4–10.5)
CHLORIDE SERPL-SCNC: 95 MMOL/L — LOW (ref 98–107)
CO2 SERPL-SCNC: 18 MMOL/L — LOW (ref 22–31)
CREAT SERPL-MCNC: 3.77 MG/DL — HIGH (ref 0.5–1.3)
CULTURE RESULTS: SIGNIFICANT CHANGE UP
EGFR: 16 ML/MIN/1.73M2 — LOW
EOSINOPHIL # BLD AUTO: 0.04 K/UL — SIGNIFICANT CHANGE UP (ref 0–0.5)
EOSINOPHIL NFR BLD AUTO: 0.3 % — SIGNIFICANT CHANGE UP (ref 0–6)
GLUCOSE BLDC GLUCOMTR-MCNC: 224 MG/DL — HIGH (ref 70–99)
GLUCOSE BLDC GLUCOMTR-MCNC: 290 MG/DL — HIGH (ref 70–99)
GLUCOSE BLDC GLUCOMTR-MCNC: 297 MG/DL — HIGH (ref 70–99)
GLUCOSE SERPL-MCNC: 253 MG/DL — HIGH (ref 70–99)
HCT VFR BLD CALC: 30.2 % — LOW (ref 39–50)
HGB BLD-MCNC: 9.6 G/DL — LOW (ref 13–17)
IANC: 10.47 K/UL — HIGH (ref 1.8–7.4)
IMM GRANULOCYTES NFR BLD AUTO: 0.4 % — SIGNIFICANT CHANGE UP (ref 0–0.9)
INR BLD: 0.93 RATIO — SIGNIFICANT CHANGE UP (ref 0.85–1.18)
LYMPHOCYTES # BLD AUTO: 0.67 K/UL — LOW (ref 1–3.3)
LYMPHOCYTES # BLD AUTO: 5.5 % — LOW (ref 13–44)
MAGNESIUM SERPL-MCNC: 2.7 MG/DL — HIGH (ref 1.6–2.6)
MCHC RBC-ENTMCNC: 20.1 PG — LOW (ref 27–34)
MCHC RBC-ENTMCNC: 31.8 GM/DL — LOW (ref 32–36)
MCV RBC AUTO: 63.2 FL — LOW (ref 80–100)
MONOCYTES # BLD AUTO: 0.85 K/UL — SIGNIFICANT CHANGE UP (ref 0–0.9)
MONOCYTES NFR BLD AUTO: 7 % — SIGNIFICANT CHANGE UP (ref 2–14)
NEUTROPHILS # BLD AUTO: 10.47 K/UL — HIGH (ref 1.8–7.4)
NEUTROPHILS NFR BLD AUTO: 86.6 % — HIGH (ref 43–77)
NRBC # BLD: 0 /100 WBCS — SIGNIFICANT CHANGE UP (ref 0–0)
NRBC # FLD: 0.09 K/UL — HIGH (ref 0–0)
PHOSPHATE SERPL-MCNC: 3.4 MG/DL — SIGNIFICANT CHANGE UP (ref 2.5–4.5)
PLATELET # BLD AUTO: 102 K/UL — LOW (ref 150–400)
POTASSIUM SERPL-MCNC: 4.3 MMOL/L — SIGNIFICANT CHANGE UP (ref 3.5–5.3)
POTASSIUM SERPL-SCNC: 4.3 MMOL/L — SIGNIFICANT CHANGE UP (ref 3.5–5.3)
PROT SERPL-MCNC: 6.6 G/DL — SIGNIFICANT CHANGE UP (ref 6–8.3)
PROTHROM AB SERPL-ACNC: 10.5 SEC — SIGNIFICANT CHANGE UP (ref 9.5–13)
RBC # BLD: 4.78 M/UL — SIGNIFICANT CHANGE UP (ref 4.2–5.8)
RBC # FLD: 19.8 % — HIGH (ref 10.3–14.5)
SODIUM SERPL-SCNC: 132 MMOL/L — LOW (ref 135–145)
SPECIMEN SOURCE: SIGNIFICANT CHANGE UP
WBC # BLD: 12.1 K/UL — HIGH (ref 3.8–10.5)
WBC # FLD AUTO: 12.1 K/UL — HIGH (ref 3.8–10.5)

## 2024-05-07 PROCEDURE — 99291 CRITICAL CARE FIRST HOUR: CPT | Mod: GC

## 2024-05-07 RX ORDER — INSULIN LISPRO 100/ML
VIAL (ML) SUBCUTANEOUS EVERY 6 HOURS
Refills: 0 | Status: DISCONTINUED | OUTPATIENT
Start: 2024-05-07 | End: 2024-06-14

## 2024-05-07 RX ADMIN — LEVETIRACETAM 400 MILLIGRAM(S): 250 TABLET, FILM COATED ORAL at 09:43

## 2024-05-07 RX ADMIN — Medication 1 APPLICATION(S): at 05:20

## 2024-05-07 RX ADMIN — Medication 1 APPLICATION(S): at 18:15

## 2024-05-07 RX ADMIN — Medication 3: at 05:20

## 2024-05-07 RX ADMIN — Medication 81 MILLIGRAM(S): at 11:41

## 2024-05-07 RX ADMIN — CHLORHEXIDINE GLUCONATE 15 MILLILITER(S): 213 SOLUTION TOPICAL at 05:21

## 2024-05-07 RX ADMIN — Medication 4: at 11:41

## 2024-05-07 RX ADMIN — Medication 6: at 18:15

## 2024-05-07 RX ADMIN — CHLORHEXIDINE GLUCONATE 1 APPLICATION(S): 213 SOLUTION TOPICAL at 05:21

## 2024-05-07 RX ADMIN — CHLORHEXIDINE GLUCONATE 15 MILLILITER(S): 213 SOLUTION TOPICAL at 18:15

## 2024-05-07 RX ADMIN — PIPERACILLIN AND TAZOBACTAM 25 GRAM(S): 4; .5 INJECTION, POWDER, LYOPHILIZED, FOR SOLUTION INTRAVENOUS at 11:41

## 2024-05-07 RX ADMIN — HEPARIN SODIUM 5000 UNIT(S): 5000 INJECTION INTRAVENOUS; SUBCUTANEOUS at 05:20

## 2024-05-07 RX ADMIN — HEPARIN SODIUM 5000 UNIT(S): 5000 INJECTION INTRAVENOUS; SUBCUTANEOUS at 18:15

## 2024-05-07 RX ADMIN — LEVETIRACETAM 400 MILLIGRAM(S): 250 TABLET, FILM COATED ORAL at 22:29

## 2024-05-07 NOTE — PROGRESS NOTE ADULT - SUBJECTIVE AND OBJECTIVE BOX
Patient is a 73y Male     Patient is a 73y old  Male who presents with a chief complaint of Unstable angina, abn EKG (06 May 2024 20:14)      HPI:  73-year-old male past medical history hypertension, ESRD on dialysis Monday Wednesday Friday, diabetes insulin-dependent presents with hiccups patient endorses persistent hiccups for the last 2 days. found to have peaked T waves, a new finding. denies dizziness, N/V, denies CP, palps, abd pain (29 Apr 2024 21:15)      PAST MEDICAL & SURGICAL HISTORY:  Diabetes      Benign essential HTN      HLD (hyperlipidemia)      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      ESRD on hemodialysis      Arteriovenous fistula          MEDICATIONS  (STANDING):  aspirin  chewable 81 milliGRAM(s) Oral daily  chlorhexidine 0.12% Liquid 15 milliLiter(s) Oral Mucosa every 12 hours  chlorhexidine 2% Cloths 1 Application(s) Topical <User Schedule>  dextrose 10% Bolus 125 milliLiter(s) IV Bolus once  dextrose 5%. 1000 milliLiter(s) (50 mL/Hr) IV Continuous <Continuous>  dextrose 5%. 1000 milliLiter(s) (100 mL/Hr) IV Continuous <Continuous>  dextrose 50% Injectable 12.5 Gram(s) IV Push once  dextrose 50% Injectable 25 Gram(s) IV Push once  epoetin reilly (EPOGEN) Injectable 58315 Unit(s) IV Push <User Schedule>  glucagon  Injectable 1 milliGRAM(s) IntraMuscular once  heparin   Injectable 5000 Unit(s) SubCutaneous every 12 hours  insulin lispro (ADMELOG) corrective regimen sliding scale   SubCutaneous every 6 hours  levETIRAcetam  IVPB 500 milliGRAM(s) IV Intermittent every 12 hours  norepinephrine Infusion 0.05 MICROgram(s)/kG/Min (2.47 mL/Hr) IV Continuous <Continuous>  petrolatum Ophthalmic Ointment 1 Application(s) Both EYES two times a day  piperacillin/tazobactam IVPB.. 3.375 Gram(s) IV Intermittent every 12 hours      Allergies    No Known Allergies    Intolerances        SOCIAL HISTORY:  Denies ETOh,Smoking,     FAMILY HISTORY:  FHx: diabetes mellitus (Father, Aunt)        REVIEW OF SYSTEMS:    CONSTITUTIONAL: No weakness, fevers or chills  EYES/ENT: No visual changes;  No vertigo or throat pain   NECK: No pain or stiffness  RESPIRATORY: No cough, wheezing, hemoptysis; No shortness of breath  CARDIOVASCULAR: No chest pain or palpitations  GASTROINTESTINAL: No abdominal or epigastric pain. No nausea, vomiting, or hematemesis; No diarrhea or constipation. No melena or hematochezia.  GENITOURINARY: No dysuria, frequency or hematuria  NEUROLOGICAL: No numbness or weakness  SKIN: No itching, burning, rashes, or lesions   All other review of systems is negative unless indicated above.    VITAL:  T(C): , Max: 37.9 (05-07-24 @ 04:00)  T(F): , Max: 100.3 (05-07-24 @ 04:00)  HR: 66 (05-07-24 @ 06:00)  BP: 120/66 (05-07-24 @ 06:00)  BP(mean): 83 (05-07-24 @ 06:00)  RR: 14 (05-07-24 @ 06:00)  SpO2: 98% (05-07-24 @ 06:00)  Wt(kg): --    I and O's:    05-04 @ 07:01  -  05-05 @ 07:00  --------------------------------------------------------  IN: 895.7 mL / OUT: 1550 mL / NET: -654.3 mL    05-05 @ 07:01  -  05-06 @ 07:00  --------------------------------------------------------  IN: 709.6 mL / OUT: 1765 mL / NET: -1055.4 mL    05-06 @ 07:01  -  05-07 @ 06:35  --------------------------------------------------------  IN: 545 mL / OUT: 0 mL / NET: 545 mL          PHYSICAL EXAM:    Constitutional: NAD  HEENT: PERRLA,   Neck: No JVD  Respiratory: CTA B/L  Cardiovascular: S1 and S2  Gastrointestinal: BS+, soft, NT/ND  Extremities: No peripheral edema  Neurological: A/O x 3, no focal deficits  Psychiatric: Normal mood, normal affect  : No Abbasi  Skin: No rashes  Access: Not applicable  Back: No CVA tenderness    LABS:                        9.6    12.10 )-----------( 102      ( 07 May 2024 00:30 )             30.2     05-07    132<L>  |  95<L>  |  46<H>  ----------------------------<  253<H>  4.3   |  18<L>  |  3.77<H>    Ca    8.3<L>      07 May 2024 00:30  Phos  3.4     05-07  Mg     2.70     05-07    TPro  6.6  /  Alb  3.1<L>  /  TBili  0.6  /  DBili  x   /  AST  44<H>  /  ALT  32  /  AlkPhos  87  05-07          RADIOLOGY & ADDITIONAL STUDIES:

## 2024-05-07 NOTE — PROGRESS NOTE ADULT - ASSESSMENT
73-year-old male past medical history hypertension, ESRD on dialysis Monday Wednesday Friday, diabetes insulin-dependent presents with hiccups patient endorses persistent hiccups for the last 2 days.   found to have peaked T waves, a new finding. denied dizziness, N/V, denies CP, palps, abd pain  Upon admission seen by CArd, renal and GI  found to have a distended GB prob 2/2 gastroparesis, was started on regaln  has received 4 doses of baclofen since 4/30 2/2 hiccups, last dose on 5/1 at 5 am  had received Haldol for agitation at 11 pm on 4/30, AMS observed in pm of 5/1  AMS ongoing, RRT x 2 called  now transferred to MICU for encephalopathy requiring  airway protection on 5/2,  intubated for airway protection, was on  propofol and pressors. now off   HD was not done timely until femoral shiley was placed 2/2 clotted RUE AVF. now removed and RIJ shiley placed on 5/3  has been nonverbal since pm 5/1.     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 as per neuro: embolic in nature.   encephalopathy probably 2/2 acute CVA  no SZ focus on EEG,   off CRRT, will resume HD as per renal.   remains intubated,   neuro recs are : dc keppra  off AC, off pressors, off CRRT, on HD as per renal,   completed 5 days of empiric ZOSYN on 5/7  toxicology consulted upon dx of encephalopathy, not impressed AMS 2/2 Haldol nor baclofen   on pressors for septic shock w Tmax 103, now afebrile, off pressors, shock resolved  remains intubated for airway protection,   leukocytosis improving  EKG changes on 5/3 c/w NSTEMI loaded w DAPT , was  on Heparin drip x 48 hrs. rpt TTE is unchanged  ID, Neuro , renal, cardiology following.  esrd, had missed HD prior to AMS 2/2 clotted RUE AVF. fistulogram when medically stable.   HD via RIght IJ Shiley.   NGT in place in stomach.   LP done, no sign of meningitis.         NEUROLOGY     - CTH without acute findings   - LP done, no acute findings  - MR brain w acute infarcts: L talya and L cerebellum, nonhemorrhagic  - no Sz focus on EEG    CARDIOVASCULAR     - ptn never had CP. just peaked T waves. was awaiting ischemic study w nucl stress test  - TTE showing EF 72%, rpt unchanged  -EKG changes on 5/3, loaded w DAPT now on Heparin drip      GI  - on NGT feeds while sedated, intubated  - Gastroparesis       RENAL   - s/p CRRT in MICU, now off, HD as per renal  - via R IJ josephineley. R fem removed  - pending fistula study of RUE when medically optimized      INFECTIOUS DISEASE   - sepsis resolved  - pan scan  of C/A/P on admission was neg  -  blood cx NTD  - s/p 5 daus pf empiric  ZOsyn    ENDOCRINE   - DM  - cw ISS    GOC:  Full code

## 2024-05-07 NOTE — PROGRESS NOTE ADULT - SUBJECTIVE AND OBJECTIVE BOX
[facility]   MD ELIANE BHAGAT MD MARIA SANTIAGO, NP    TEL:  OFFICE: [phone]  From 5pm-7am Answering Service [phone]    -- RENAL FOLLOW UP NOTE ---Date of Service 05-07-24 @ 15:53    Patient is a 73y old  Male who presents with a chief complaint of Unstable angina, abn EKG       Patient seen and examined in ICU. Patient intubated in no apparent distress    VITALS:  T(F): 99.6 (05-07-24 @ 16:00), Max: 100.3 (05-07-24 @ 04:00)  HR: 71 (05-07-24 @ 15:00)  BP: 121/66 (05-07-24 @ 15:00)  RR: 14 (05-07-24 @ 15:00)  SpO2: 98% (05-07-24 @ 15:00)  Wt(kg): --    05-06 @ 07:01  -  05-07 @ 07:00  --------------------------------------------------------  IN: 545 mL / OUT: 0 mL / NET: 545 mL    05-07 @ 07:01  -  05-07 @ 15:53  --------------------------------------------------------  IN: 790 mL / OUT: 1778 mL / NET: -988 mL          PHYSICAL EXAM:  General: NAD  Neck: No JVD  Respiratory: intubated  Cardiovascular: S1, S2, RRR  Gastrointestinal: BS+, soft, NT/ND  Extremities: No peripheral edema    Hospital Medications:   MEDICATIONS  (STANDING):  aspirin  chewable 81 milliGRAM(s) Oral daily  chlorhexidine 0.12% Liquid 15 milliLiter(s) Oral Mucosa every 12 hours  chlorhexidine 2% Cloths 1 Application(s) Topical <User Schedule>  dextrose 10% Bolus 125 milliLiter(s) IV Bolus once  dextrose 5%. 1000 milliLiter(s) (50 mL/Hr) IV Continuous <Continuous>  dextrose 5%. 1000 milliLiter(s) (100 mL/Hr) IV Continuous <Continuous>  dextrose 50% Injectable 25 Gram(s) IV Push once  dextrose 50% Injectable 12.5 Gram(s) IV Push once  epoetin reilly (EPOGEN) Injectable 78283 Unit(s) IV Push <User Schedule>  glucagon  Injectable 1 milliGRAM(s) IntraMuscular once  heparin   Injectable 5000 Unit(s) SubCutaneous every 12 hours  insulin lispro (ADMELOG) corrective regimen sliding scale   SubCutaneous every 6 hours  levETIRAcetam  IVPB 500 milliGRAM(s) IV Intermittent every 12 hours  petrolatum Ophthalmic Ointment 1 Application(s) Both EYES two times a day      LABS:  05-07    132<L>  |  95<L>  |  46<H>  ----------------------------<  253<H>  4.3   |  18<L>  |  3.77<H>    Ca    8.3<L>      07 May 2024 00:30  Phos  3.4     05-07  Mg     2.70     05-07    TPro  6.6  /  Alb  3.1<L>  /  TBili  0.6  /  DBili      /  AST  44<H>  /  ALT  32  /  AlkPhos  87  05-07    Creatinine Trend: 3.77 <--, 1.83 <--, 2.06 <--, 2.39 <--, 2.95 <--, 3.47 <--, 4.38 <--, 5.30 <--, 6.47 <--, 7.57 <--, 9.22 <--, 7.72 <--, 4.98 <--, 7.58 <--, 7.16 <--, 9.02 <--    Albumin: 3.1 g/dL (05-07 @ 00:30)  Phosphorus: 3.4 mg/dL (05-07 @ 00:30)                              9.6    12.10 )-----------( 102      ( 07 May 2024 00:30 )             30.2     Urine Studies:  Urinalysis - [05-07-24 @ 00:30]      Color  / Appearance  / SG  / pH       Gluc 253 / Ketone   / Bili  / Urobili        Blood  / Protein  / Leuk Est  / Nitrite       RBC  / WBC  / Hyaline  / Gran  / Sq Epi  / Non Sq Epi  / Bacteria       Iron 47, TIBC --, %sat --      [05-01-24 @ 06:44]  TSH 2.79      [04-30-24 @ 06:03]    HBsAb <3.0      [05-01-24 @ 14:42]  HBcAb Nonreact      [05-01-24 @ 14:42]      RADIOLOGY & ADDITIONAL STUDIES:

## 2024-05-07 NOTE — PROGRESS NOTE ADULT - SUBJECTIVE AND OBJECTIVE BOX
Vascular Surgery Progress Note     Subjective:  Patient seen and examined on AM rounds. Patient is off pressors but remains intubated on AC. Opens eyes to voice.      Vital Signs:  Vital Signs Last 24 Hrs  T(C): 37.6 (07 May 2024 08:00), Max: 37.9 (07 May 2024 04:00)  T(F): 99.7 (07 May 2024 08:00), Max: 100.3 (07 May 2024 04:00)  HR: 56 (07 May 2024 09:00) (56 - 71)  BP: 127/65 (07 May 2024 09:00) (91/58 - 148/68)  BP(mean): 83 (07 May 2024 09:00) (68 - 91)  RR: 14 (07 May 2024 09:00) (14 - 20)  SpO2: 98% (07 May 2024 09:00) (93% - 100%)    Parameters below as of 07 May 2024 09:00  Patient On (Oxygen Delivery Method): ventilator    O2 Concentration (%): 21    CAPILLARY BLOOD GLUCOSE      POCT Blood Glucose.: 290 mg/dL (07 May 2024 05:19)  POCT Blood Glucose.: 257 mg/dL (06 May 2024 23:02)  POCT Blood Glucose.: 192 mg/dL (06 May 2024 18:06)  POCT Blood Glucose.: 135 mg/dL (06 May 2024 11:23)      I&O's Detail    06 May 2024 07:01  -  07 May 2024 07:00  --------------------------------------------------------  IN:    IV PiggyBack: 200 mL    Nepro with Carb Steady: 345 mL  Total IN: 545 mL    OUT:  Total OUT: 0 mL    Total NET: 545 mL      07 May 2024 07:01  -  07 May 2024 09:32  --------------------------------------------------------  IN:  Total IN: 0 mL    OUT:    Voided (mL): 0 mL  Total OUT: 0 mL    Total NET: 0 mL            Physical Exam:  General: NAD, sedated but opens eyes to voice  HEENT: Normocephalic atraumatic  Respiratory: Intubated on vent  Cardio: regular rate  Vascular: extremities are warm and well perfused, palpable b/l radial pulses, R AVF w/ faint thrill, R IJ shiley in place      Labs:    05-07    132<L>  |  95<L>  |  46<H>  ----------------------------<  253<H>  4.3   |  18<L>  |  3.77<H>    Ca    8.3<L>      07 May 2024 00:30  Phos  3.4     05-07  Mg     2.70     05-07    TPro  6.6  /  Alb  3.1<L>  /  TBili  0.6  /  DBili  x   /  AST  44<H>  /  ALT  32  /  AlkPhos  87  05-07    LIVER FUNCTIONS - ( 07 May 2024 00:30 )  Alb: 3.1 g/dL / Pro: 6.6 g/dL / ALK PHOS: 87 U/L / ALT: 32 U/L / AST: 44 U/L / GGT: x                                 9.6    12.10 )-----------( 102      ( 07 May 2024 00:30 )             30.2     PT/INR - ( 07 May 2024 00:30 )   PT: 10.5 sec;   INR: 0.93 ratio         PTT - ( 05 May 2024 23:00 )  PTT:64.0 sec     Vascular Surgery Progress Note     Subjective:  Patient seen and examined on AM rounds. Patient is off pressors but remains intubated. Opens eyes to voice.      Vital Signs:  Vital Signs Last 24 Hrs  T(C): 37.6 (07 May 2024 08:00), Max: 37.9 (07 May 2024 04:00)  T(F): 99.7 (07 May 2024 08:00), Max: 100.3 (07 May 2024 04:00)  HR: 56 (07 May 2024 09:00) (56 - 71)  BP: 127/65 (07 May 2024 09:00) (91/58 - 148/68)  BP(mean): 83 (07 May 2024 09:00) (68 - 91)  RR: 14 (07 May 2024 09:00) (14 - 20)  SpO2: 98% (07 May 2024 09:00) (93% - 100%)    Parameters below as of 07 May 2024 09:00  Patient On (Oxygen Delivery Method): ventilator    O2 Concentration (%): 21    CAPILLARY BLOOD GLUCOSE      POCT Blood Glucose.: 290 mg/dL (07 May 2024 05:19)  POCT Blood Glucose.: 257 mg/dL (06 May 2024 23:02)  POCT Blood Glucose.: 192 mg/dL (06 May 2024 18:06)  POCT Blood Glucose.: 135 mg/dL (06 May 2024 11:23)      I&O's Detail    06 May 2024 07:01  -  07 May 2024 07:00  --------------------------------------------------------  IN:    IV PiggyBack: 200 mL    Nepro with Carb Steady: 345 mL  Total IN: 545 mL    OUT:  Total OUT: 0 mL    Total NET: 545 mL      07 May 2024 07:01  -  07 May 2024 09:32  --------------------------------------------------------  IN:  Total IN: 0 mL    OUT:    Voided (mL): 0 mL  Total OUT: 0 mL    Total NET: 0 mL            Physical Exam:  General: NAD, sedated but opens eyes to voice  HEENT: Normocephalic atraumatic  Respiratory: Intubated on vent  Cardio: regular rate  Vascular: extremities are warm and well perfused, palpable b/l radial pulses, R AVF w/ faint thrill, R IJ shiley in place      Labs:    05-07    132<L>  |  95<L>  |  46<H>  ----------------------------<  253<H>  4.3   |  18<L>  |  3.77<H>    Ca    8.3<L>      07 May 2024 00:30  Phos  3.4     05-07  Mg     2.70     05-07    TPro  6.6  /  Alb  3.1<L>  /  TBili  0.6  /  DBili  x   /  AST  44<H>  /  ALT  32  /  AlkPhos  87  05-07    LIVER FUNCTIONS - ( 07 May 2024 00:30 )  Alb: 3.1 g/dL / Pro: 6.6 g/dL / ALK PHOS: 87 U/L / ALT: 32 U/L / AST: 44 U/L / GGT: x                                 9.6    12.10 )-----------( 102      ( 07 May 2024 00:30 )             30.2     PT/INR - ( 07 May 2024 00:30 )   PT: 10.5 sec;   INR: 0.93 ratio         PTT - ( 05 May 2024 23:00 )  PTT:64.0 sec

## 2024-05-07 NOTE — PROGRESS NOTE ADULT - SUBJECTIVE AND OBJECTIVE BOX
Progress Note    JIMMY Glasgow MD 73y (1950) Male [id number]  04-29-24 (8d)      Chief Complaint: Unstable angina, abn EKG    Subjective:  No acute events overnight. Patient seen and examined at bedside.    Review of Systems:  CONSTITUTIONAL: No fever, weight loss, or fatigue  EYES: No eye pain, visual disturbances, or discharge  ENMT:  No difficulty hearing, tinnitus, vertigo; No sinus or throat pain  NECK: No pain or stiffness  RESPIRATORY: No cough, wheezing, chills or hemoptysis; No shortness of breath  CARDIOVASCULAR: No chest pain, palpitations, dizziness, or leg swelling  GASTROINTESTINAL: No abdominal or epigastric pain. No nausea, vomiting, or hematemesis; No diarrhea or constipation. No melena or hematochezia.  GENITOURINARY: No dysuria, frequency, hematuria, or incontinence  NEUROLOGICAL: No headaches, memory loss, loss of strength, numbness, or tremors  SKIN: No itching, burning, rashes, or lesions   MUSCULOSKELETAL: No joint pain or swelling; No muscle, back, or extremity pain      PAST MEDICAL & SURGICAL HISTORY:  Diabetes [250.00]    Stage 5 chronic kidney disease not on chronic dialysis [N18.5]    Benign essential HTN [I10]    HLD (hyperlipidemia) [E78.5]    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 [N18.6]    ESRD on hemodialysis [N18.6]    No significant past surgical history [664046435]    AVF (arteriovenous fistula) [I77.0]    Refined to: Arteriovenous fistula    Arteriovenous fistula [I77.0]      aspirin  chewable 81 milliGRAM(s) Oral daily  chlorhexidine 0.12% Liquid 15 milliLiter(s) Oral Mucosa every 12 hours  chlorhexidine 2% Cloths 1 Application(s) Topical <User Schedule>  dextrose 10% Bolus 125 milliLiter(s) IV Bolus once  dextrose 5%. 1000 milliLiter(s) IV Continuous <Continuous>  dextrose 5%. 1000 milliLiter(s) IV Continuous <Continuous>  dextrose 50% Injectable 12.5 Gram(s) IV Push once  dextrose 50% Injectable 25 Gram(s) IV Push once  dextrose Oral Gel 15 Gram(s) Oral once PRN  epoetin reilly (EPOGEN) Injectable 83394 Unit(s) IV Push <User Schedule>  glucagon  Injectable 1 milliGRAM(s) IntraMuscular once  heparin   Injectable 5000 Unit(s) SubCutaneous every 12 hours  insulin lispro (ADMELOG) corrective regimen sliding scale   SubCutaneous every 6 hours  levETIRAcetam  IVPB 500 milliGRAM(s) IV Intermittent every 12 hours  norepinephrine Infusion 0.05 MICROgram(s)/kG/Min IV Continuous <Continuous>  petrolatum Ophthalmic Ointment 1 Application(s) Both EYES two times a day  piperacillin/tazobactam IVPB.. 3.375 Gram(s) IV Intermittent every 12 hours    Objective:  T(C): 37.9 (05-07-24 @ 04:00), Max: 37.9 (05-07-24 @ 04:00)  HR: 66 (05-07-24 @ 06:00) (59 - 82)  BP: 120/66 (05-07-24 @ 06:00) (91/58 - 148/68)  RR: 14 (05-07-24 @ 06:00) (14 - 20)  SpO2: 98% (05-07-24 @ 06:00) (93% - 100%)    Physical exam:  GENERAL: NAD, well-developed  HEAD:  Atraumatic, Normocephalic  EYES: EOMI, PERRLA, conjunctiva and sclera clear  NECK: Supple, No JVD  CHEST/LUNG: Clear to auscultation bilaterally; No wheeze  HEART: Regular rate and rhythm; No murmurs, rubs, or gallops  ABDOMEN: Soft, Nontender, Nondistended; Bowel sounds present  EXTREMITIES:  2+ Peripheral Pulses, No clubbing, cyanosis, or edema  PSYCH: AAOx3  NEUROLOGY: non-focal  SKIN: No rashes or lesions      05-06-24 @ 07:01  -  05-07-24 @ 07:00  --------------------------------------------------------  IN: 545 mL / OUT: 0 mL / NET: 545 mL        CAPILLARY BLOOD GLUCOSE      (05-07 @ 00:30)                      9.6  12.10 )-----------( 102                 30.2    Neutrophils = 10.47 (86.6%)  Lymphocytes = 0.67 (5.5%)  Eosinophils = 0.04 (0.3%)  Basophils = 0.02 (0.2%)  Monocytes = 0.85 (7.0%)  Bands = --%    05-07    132<L>  |  95<L>  |  46<H>  ----------------------------<  253<H>  4.3   |  18<L>  |  3.77<H>    Ca    8.3<L>      07 May 2024 00:30  Phos  3.4     05-07  Mg     2.70     05-07    TPro  6.6  /  Alb  3.1<L>  /  TBili  0.6  /  DBili  x   /  AST  44<H>  /  ALT  32  /  AlkPhos  87  05-07    ( 07 May 2024 00:30 )   PT: 10.5 sec;   INR: 0.93 ratio;       PTT:64.0 sec      RVP:    Venous Blood Gas:  05-07 @ 01:00  7.42/36/54/23/83.0  VBG Lactate: 1.3        Tox:         Urinalysis Basic - ( 07 May 2024 00:30 )    Color: x / Appearance: x / SG: x / pH: x  Gluc: 253 mg/dL / Ketone: x  / Bili: x / Urobili: x   Blood: x / Protein: x / Nitrite: x   Leuk Esterase: x / RBC: x / WBC x   Sq Epi: x / Non Sq Epi: x / Bacteria: x        WBC Trend: 12.10<--, 16.47<--, 16.48<--    Hb Trend: 9.6<--, 10.5<--, 10.1<--, 10.3<--, 10.5<--        New imaging in last 24 hours:  Consult notes reviewed: Progress Note    JIMMY Glasgow MD 73y (1950) Male [id number]  04-29-24 (8d)      Chief Complaint: Unstable angina, abn EKG    Subjective:  No acute events overnight.   Lying in bed shakes head no to pain. On vent.     Review of Systems:  Denies any pain.       PAST MEDICAL & SURGICAL HISTORY:  Diabetes [250.00]    Stage 5 chronic kidney disease not on chronic dialysis [N18.5]    Benign essential HTN [I10]    HLD (hyperlipidemia) [E78.5]    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 [N18.6]    ESRD on hemodialysis [N18.6]    No significant past surgical history [554659588]    AVF (arteriovenous fistula) [I77.0]    Refined to: Arteriovenous fistula    Arteriovenous fistula [I77.0]      aspirin  chewable 81 milliGRAM(s) Oral daily  chlorhexidine 0.12% Liquid 15 milliLiter(s) Oral Mucosa every 12 hours  chlorhexidine 2% Cloths 1 Application(s) Topical <User Schedule>  dextrose 10% Bolus 125 milliLiter(s) IV Bolus once  dextrose 5%. 1000 milliLiter(s) IV Continuous <Continuous>  dextrose 5%. 1000 milliLiter(s) IV Continuous <Continuous>  dextrose 50% Injectable 12.5 Gram(s) IV Push once  dextrose 50% Injectable 25 Gram(s) IV Push once  dextrose Oral Gel 15 Gram(s) Oral once PRN  epoetin reilly (EPOGEN) Injectable 60576 Unit(s) IV Push <User Schedule>  glucagon  Injectable 1 milliGRAM(s) IntraMuscular once  heparin   Injectable 5000 Unit(s) SubCutaneous every 12 hours  insulin lispro (ADMELOG) corrective regimen sliding scale   SubCutaneous every 6 hours  levETIRAcetam  IVPB 500 milliGRAM(s) IV Intermittent every 12 hours  norepinephrine Infusion 0.05 MICROgram(s)/kG/Min IV Continuous <Continuous>  petrolatum Ophthalmic Ointment 1 Application(s) Both EYES two times a day  piperacillin/tazobactam IVPB.. 3.375 Gram(s) IV Intermittent every 12 hours    Objective:  T(C): 37.9 (05-07-24 @ 04:00), Max: 37.9 (05-07-24 @ 04:00)  HR: 66 (05-07-24 @ 06:00) (59 - 82)  BP: 120/66 (05-07-24 @ 06:00) (91/58 - 148/68)  RR: 14 (05-07-24 @ 06:00) (14 - 20)  SpO2: 98% (05-07-24 @ 06:00) (93% - 100%)    Physical exam:  GENERAL: NAD, cachetic  HEAD:  Atraumatic, Normocephalic  EYES: EOMI, PERRLA, conjunctiva and sclera clear  NECK: Supple, No JVD  CHEST/LUNG: Clear to auscultation bilaterally; No wheeze  HEART: Regular rate and rhythm; No murmurs, rubs, or gallops  ABDOMEN: Soft, Nontender, Nondistended; Bowel sounds present  EXTREMITIES:  2+ Peripheral Pulses, No clubbing, cyanosis, or edema  PSYCH: following commands on vent  NEUROLOGY: not squeezing right hand. Otherwise moving extremities weakly. Intermittently not initiating his own breaths.   SKIN: No rashes or lesions      05-06-24 @ 07:01  -  05-07-24 @ 07:00  --------------------------------------------------------  IN: 545 mL / OUT: 0 mL / NET: 545 mL        CAPILLARY BLOOD GLUCOSE      (05-07 @ 00:30)                      9.6  12.10 )-----------( 102                 30.2    Neutrophils = 10.47 (86.6%)  Lymphocytes = 0.67 (5.5%)  Eosinophils = 0.04 (0.3%)  Basophils = 0.02 (0.2%)  Monocytes = 0.85 (7.0%)  Bands = --%    05-07    132<L>  |  95<L>  |  46<H>  ----------------------------<  253<H>  4.3   |  18<L>  |  3.77<H>    Ca    8.3<L>      07 May 2024 00:30  Phos  3.4     05-07  Mg     2.70     05-07    TPro  6.6  /  Alb  3.1<L>  /  TBili  0.6  /  DBili  x   /  AST  44<H>  /  ALT  32  /  AlkPhos  87  05-07    ( 07 May 2024 00:30 )   PT: 10.5 sec;   INR: 0.93 ratio;       PTT:64.0 sec      RVP:    Venous Blood Gas:  05-07 @ 01:00  7.42/36/54/23/83.0  VBG Lactate: 1.3        Tox:         Urinalysis Basic - ( 07 May 2024 00:30 )    Color: x / Appearance: x / SG: x / pH: x  Gluc: 253 mg/dL / Ketone: x  / Bili: x / Urobili: x   Blood: x / Protein: x / Nitrite: x   Leuk Esterase: x / RBC: x / WBC x   Sq Epi: x / Non Sq Epi: x / Bacteria: x        WBC Trend: 12.10<--, 16.47<--, 16.48<--    Hb Trend: 9.6<--, 10.5<--, 10.1<--, 10.3<--, 10.5<--        New imaging in last 24 hours:  Consult notes reviewed:

## 2024-05-07 NOTE — PROGRESS NOTE ADULT - ASSESSMENT
73-year-old male past medical history hypertension, ESRD on dialysis Monday Wednesday Friday, diabetes insulin-dependent presents with hiccups patient endorses persistent hiccups for the last 2 days. Nephrology consulted for HD needs.    A/P  ESRD:  Center: [facility]  Nephrologist: Dr. Hardwick  Access: R AVF  MWF schedule  Vascular for fistulogram   s/p femoral shiley on 5/1, now removed  s/p placement of IJ shiley 5/3  Stopped CRRT   Restart IHD-Likely HD tomorrow  Consent obtained and placed in ED chart  Renal diet  Monitor BMP    Hyperkalemia  s/p HD on 5/1  Stable.  Low K Diet.  Monitor closely     HTN:  Fluctuating; low  On pressors.  Care per ICU.  Monitor BP    Anemia:  At goal  Monitor Hb  SILVA if Hgb <11.    CKD-MBD  Check PTH  PO4 acceptable  -  if high consider calcium acetate 667mg TID.  Low PO4 diet.  Monitor Ca, PO4 daily   73-year-old male past medical history hypertension, ESRD on dialysis Monday Wednesday Friday, diabetes insulin-dependent presents with hiccups patient endorses persistent hiccups for the last 2 days. Nephrology consulted for HD needs.    A/P  ESRD:  Center: [facility]  Nephrologist: Dr. Hardwick  Access: R AVF  MWF schedule  Vascular for fistulogram   s/p femoral shiley on 5/1, now removed  s/p placement of IJ shiley 5/3  Stopped CRRT   Restart IHD  s/p HD today UF [id number]; treatment terminated early due to hypotension   Consent obtained and placed in ED chart  Renal diet  Monitor BMP    Hyperkalemia  s/p HD on 5/1  Stable.  Low K Diet.  Monitor closely     HTN:  Marginal   Off pressors.  Care per ICU.  Monitor BP    Anemia:  SILVA w/ HD  Monitor Hb    CKD-MBD  Check PTH  PO4 acceptable  -  if high consider calcium acetate 667mg TID.  Low PO4 diet.  Monitor Ca, PO4 daily   73-year-old male past medical history hypertension, ESRD on dialysis Monday Wednesday Friday, diabetes insulin-dependent presents with hiccups patient endorses persistent hiccups for the last 2 days. Nephrology consulted for HD needs.    A/P  ESRD:  Center: [facility]  Nephrologist: Dr. Hardwick  Access: R AVF  MWF schedule  Vascular for fistulogram   s/p femoral shiley on 5/1, now removed  s/p placement of IJ shiley 5/3  Stopped CRRT   Restarted IHD  s/p HD today UF 1778; treatment terminated early due to hypotension   Consent obtained and placed in ED chart  Renal diet  Monitor BMP    Hyperkalemia  s/p HD on 5/1  Stable.  Low K Diet.  Monitor closely     HTN:  Marginal   Off pressors.  Care per ICU.  Monitor BP    Anemia:  SILVA w/ HD  Monitor Hb    CKD-MBD  Check PTH  PO4 acceptable  -  if high consider calcium acetate 667mg TID.  Low PO4 diet.  Monitor Ca, PO4 daily

## 2024-05-07 NOTE — PROGRESS NOTE ADULT - SUBJECTIVE AND OBJECTIVE BOX
Neurology Progress Note    S: Patient seen and examined. MR reviewed    Medications: MEDICATIONS  (STANDING):  aspirin  chewable 81 milliGRAM(s) Oral daily  chlorhexidine 0.12% Liquid 15 milliLiter(s) Oral Mucosa every 12 hours  chlorhexidine 2% Cloths 1 Application(s) Topical <User Schedule>  dextrose 10% Bolus 125 milliLiter(s) IV Bolus once  dextrose 5%. 1000 milliLiter(s) (100 mL/Hr) IV Continuous <Continuous>  dextrose 5%. 1000 milliLiter(s) (50 mL/Hr) IV Continuous <Continuous>  dextrose 50% Injectable 12.5 Gram(s) IV Push once  dextrose 50% Injectable 25 Gram(s) IV Push once  epoetin reilly (EPOGEN) Injectable 00980 Unit(s) IV Push <User Schedule>  glucagon  Injectable 1 milliGRAM(s) IntraMuscular once  heparin   Injectable 5000 Unit(s) SubCutaneous every 12 hours  insulin lispro (ADMELOG) corrective regimen sliding scale   SubCutaneous every 6 hours  levETIRAcetam  IVPB 500 milliGRAM(s) IV Intermittent every 12 hours  petrolatum Ophthalmic Ointment 1 Application(s) Both EYES two times a day    MEDICATIONS  (PRN):  dextrose Oral Gel 15 Gram(s) Oral once PRN Blood Glucose LESS THAN 70 milliGRAM(s)/deciliter       Vitals:  Vital Signs Last 24 Hrs  T(C): 37.7 (07 May 2024 20:00), Max: 37.9 (07 May 2024 04:00)  T(F): 99.9 (07 May 2024 20:00), Max: 100.3 (07 May 2024 04:00)  HR: 74 (07 May 2024 20:00) (54 - 82)  BP: 106/67 (07 May 2024 20:00) (102/61 - 164/75)  BP(mean): 80 (07 May 2024 20:00) (73 - 100)  RR: 14 (07 May 2024 20:00) (14 - 19)  SpO2: 99% (07 May 2024 20:00) (79% - 100%)    Parameters below as of 07 May 2024 20:00  Patient On (Oxygen Delivery Method): ventilator          General Exam:   General Appearance: intubated, not sedasted    Head: Normocephalic, atraumatic and no dysmorphic features  Ear, Nose, and Throat: Moist mucous membranes  CVS: S1S2+  Resp: mechanical  Abd: soft NTND  Extremities: No edema, no cyanosis  Skin: No bruises, no rashes     Neurological Exam:  Mental Status: intubated, not sedated. Opens eyes to voice, does not track or FC  Cranial Nerves: pupils 1-2mm, no facial asymmetry  Motor: dec tone througout, no spontaneous movement  Sensation: no Wd to noxious     I personally reviewed the below data/images/labs:      LABS:                          9.6    12.10 )-----------( 102      ( 07 May 2024 00:30 )             30.2     05-07    132<L>  |  95<L>  |  46<H>  ----------------------------<  253<H>  4.3   |  18<L>  |  3.77<H>    Ca    8.3<L>      07 May 2024 00:30  Phos  3.4     05-07  Mg     2.70     05-07    TPro  6.6  /  Alb  3.1<L>  /  TBili  0.6  /  DBili  x   /  AST  44<H>  /  ALT  32  /  AlkPhos  87  05-07    LIVER FUNCTIONS - ( 07 May 2024 00:30 )  Alb: 3.1 g/dL / Pro: 6.6 g/dL / ALK PHOS: 87 U/L / ALT: 32 U/L / AST: 44 U/L / GGT: x           PT/INR - ( 07 May 2024 00:30 )   PT: 10.5 sec;   INR: 0.93 ratio         PTT - ( 05 May 2024 23:00 )  PTT:64.0 sec  Urinalysis Basic - ( 07 May 2024 00:30 )    Color: x / Appearance: x / SG: x / pH: x  Gluc: 253 mg/dL / Ketone: x  / Bili: x / Urobili: x   Blood: x / Protein: x / Nitrite: x   Leuk Esterase: x / RBC: x / WBC x   Sq Epi: x / Non Sq Epi: x / Bacteria: x          CT Head No Cont:  (01 May 2024 18:11)  < from: CT Head No Cont (05.01.24 @ 18:11) >    ACC: [id number] EXAM:  CT BRAIN   ORDERED BY: SHELBY MOREL     PROCEDURE DATE:  05/01/2024          INTERPRETATION:  CT OF THE HEAD WITHOUT CONTRAST    CLINICAL INDICATION: Lethargy.    TECHNIQUE: Volumetric CT acquisition was performed through the brain and   reviewed using brain and bone window technique.      COMPARISON: CT head from 1/17/2024    FINDINGS:    The ventricular and sulcal size and configuration is age appropriate.     There is no acute loss of gray-white differentiation. Stable bilateral   inferior frontal encephalomalacia and gliosis likely related to remote   trauma.    There is no evidence of mass effect, midline shift, acute intracranial   hemorrhage, or extra-axial collections.     The calvarium is intact. The paranasalsinuses are clear.The mastoid air   cells are predominantly clear. The orbits are unremarkable.      IMPRESSION:  No acute intracranial hemorrhage or acute territorial infarction. Chronic   findings as above.    --- End of Report ---          GILDARDO KHAN; Resident Radiologist  This document has been electronically signed.  LADARIUS DENNY MD; Attending Radiologist  This document has been electronically signed. May  1 2024  7:54PM    < end of copied text >      EEG Classification / Summary:  Abnormal EEG in the awake, drowsy states.   -Generalized sharp waves with triphasic morphology, initially appearing as frequent GPDs at 1-1.5 Hz, decreasing in prevalence later in recording.  -Variable moderate to mild diffuse slowing.  -No electrographic seizures are captured.     Clinical Impression:  -Generalized sharp waves with triphasic morphology can be seen in toxic-metabolic encephalopathies and indicate some risk of generalized seizures, especially when at higher frequencies than in this study. These decrease in prevalence over the course of recording.  -Variable moderate to mild diffuse cerebral dysfunction is nonspecific in etiology.   -No seizures x2 days of recording.    m< from: MR Head w/wo IV Cont (05.06.24 @ 18:10) >    ACC: [id number] EXAM:  MR BRAIN WAW IC   ORDERED BY: DOLORES QUICK     PROCEDURE DATE:  05/06/2024          INTERPRETATION:  CLINICAL INDICATION: Altered mental status.    TECHNIQUE: Multi-planar, multi-sequence magnetic resonance imaging of the   brain was performed with and without intravenous contrast.    CONTRAST: Post-contrast MR images were obtained following infusion of 5   mL of Gadavist    COMPARISON: No prior studies are available for comparison    FINDINGS:    VENTRICLES AND SULCI:  Normal.  INTRA-AXIAL: Punctate foci of restricted diffusion in the left talya and   left cerebellum with associated T2 prolongation consistent with acute   infarcts. No acute intracranial hemorrhage. Encephalomalacia/gliosis   noted in the inferior frontal lobes. No abnormal enhancement. There are   patchy and confluent foci of hyperintense T2 signal within the   subcortical and periventricular white matter which are nonspecific but   likely related to chronic microvascular ischemic disease.  EXTRA-AXIAL:  No mass or collection.  VISUALIZED SINUSES: Mild polypoid mucosal thickening. Fluid noted in the   nasopharynx.  VISUALIZED MASTOIDS:  Clear.  CALVARIUM:  Normal.  CAROTID FLOW VOIDS: Normal.  MISCELLANEOUS:  None.    IMPRESSION: PUNCTATE FOCI OF RESTRICTED DIFFUSION IN THE LEFT TALYA AND   LEFT CEREBELLUM WITH ASSOCIATED T2 PROLONGATION CONSISTENT WITH ACUTE   INFARCTS. NO ACUTE INTRACRANIAL HEMORRHAGE. NO AREA OF ABNORMAL   ENHANCEMENT.    < end of copied text >

## 2024-05-07 NOTE — PROGRESS NOTE ADULT - ASSESSMENT
73M PMHx HTN, ESRD, IDDM p/w hiccups and started on baclofen with concern for AMS overnight and desaturation, ?cheye nascimento respirations. Labs with numerous metabolic derangements. CTH with bifrontal encephalomalacia, likley traumatic.   WBC inc significantly, now intubated. Exam limited as on propofol, also on pressors.   s/p LP for meningitis concerns however appears bland - not on antibiotics  EEG with GPDs, no seizures  MR brain with punctuate L pontine and L cerebellar infarcts    AMS of unclear etiology - ? baclofen toxicity, no evidence of seizures. Metabolic encephalopathy  L pontine and L cerebellar strokes - embolic appearing  Intractable hiccups  hypoxic resp failure  ESRD on HD    MRI brain reviewed, as above  cont aspirin 81mg  would eventually need vessel imaging - either CTA H/N prior to HD or MRA H/N to complete w/u  TTE reviewed, no need to repeat for now  Keppra started for GPDs, no improvement in mental status - would consider stopping Keppra as it can be interfering with mental status.   vent mgmt per ICU  HD per nephro  f/u remainder of CSF - neg thus far  s/p Zosyn for pna  Dvt ppx

## 2024-05-07 NOTE — PROGRESS NOTE ADULT - SUBJECTIVE AND OBJECTIVE BOX
Cardiovascular Disease Progress Note  DATE OF SERVICE: 24 @ 08:05    Overnight events: No acute events overnight.    The patient remains intubated.  No distress.        Objective Findings:  T(C): 37.9 (24 @ 04:00), Max: 37.9 (24 @ 04:00)  HR: 65 (24 @ 07:00) (59 - 79)  BP: 135/69 (24 @ 07:00) (91/58 - 148/68)  RR: 14 (24 @ 07:00) (14 - 20)  SpO2: 96% (24 @ 07:00) (93% - 100%)  Wt(kg): --  Daily     Daily Weight in k (07 May 2024 06:00)      Physical Exam:  Gen: NAD; Patient resting comfortably  HEENT:   Normocephalic/ atraumatic  CV: RRR, normal S1 + S2, no m/r/g  Lungs:  Normal respiratory effort; mechanical ventilation via ETT.   Abd: soft, non-tender; bowel sounds present  Ext: No edema; warm and well perfused    Telemetry: Sinus    Laboratory Data:                        9.6    12.10 )-----------( 102      ( 07 May 2024 00:30 )             30.2         132<L>  |  95<L>  |  46<H>  ----------------------------<  253<H>  4.3   |  18<L>  |  3.77<H>    Ca    8.3<L>      07 May 2024 00:30  Phos  3.4       Mg     2.70         TPro  6.6  /  Alb  3.1<L>  /  TBili  0.6  /  DBili  x   /  AST  44<H>  /  ALT  32  /  AlkPhos  87      PT/INR - ( 07 May 2024 00:30 )   PT: 10.5 sec;   INR: 0.93 ratio         PTT - ( 05 May 2024 23:00 )  PTT:64.0 sec          Inpatient Medications:  MEDICATIONS  (STANDING):  aspirin  chewable 81 milliGRAM(s) Oral daily  chlorhexidine 0.12% Liquid 15 milliLiter(s) Oral Mucosa every 12 hours  chlorhexidine 2% Cloths 1 Application(s) Topical <User Schedule>  dextrose 10% Bolus 125 milliLiter(s) IV Bolus once  dextrose 5%. 1000 milliLiter(s) (50 mL/Hr) IV Continuous <Continuous>  dextrose 5%. 1000 milliLiter(s) (100 mL/Hr) IV Continuous <Continuous>  dextrose 50% Injectable 12.5 Gram(s) IV Push once  dextrose 50% Injectable 25 Gram(s) IV Push once  epoetin reilly (EPOGEN) Injectable 24601 Unit(s) IV Push <User Schedule>  glucagon  Injectable 1 milliGRAM(s) IntraMuscular once  heparin   Injectable 5000 Unit(s) SubCutaneous every 12 hours  insulin lispro (ADMELOG) corrective regimen sliding scale   SubCutaneous every 6 hours  levETIRAcetam  IVPB 500 milliGRAM(s) IV Intermittent every 12 hours  norepinephrine Infusion 0.05 MICROgram(s)/kG/Min (2.47 mL/Hr) IV Continuous <Continuous>  petrolatum Ophthalmic Ointment 1 Application(s) Both EYES two times a day  piperacillin/tazobactam IVPB.. 3.375 Gram(s) IV Intermittent every 12 hours      Assessment: 73 year old man with HTN, HLD, T2DM on insulin, and ESRD on HD presents with supply demand ischemia and angina.    Plan of Care:    #Type II myocardial infarction-  Secondary to distributive shock.   ST deviation noted on EKG, however interpretation is limited by LVH with repolarization changes.  The repeat echo shows no new wall motion abnormality.   I would not pursue cath at this time given mental status changes.   Medical management of NSTEMI. .  ASA 81 mg daily         #Sinus bradycardia-  No evidence of AV block on admission EKG or telemetry.  No indication for pacing at this time.     #HTN-  Recent shock.  Pressors now off    #ESRD-  CRRT in the MICU.       Patient critically ill in the MICU        Over 48 minutes of critical care time spent on total encounter; more than 50% of the visit was spent counseling and/or coordinating care by the attending physician.      Geovani Bravo MD [facility]  Cardiovascular Disease  [phone]

## 2024-05-07 NOTE — PROGRESS NOTE ADULT - SUBJECTIVE AND OBJECTIVE BOX
[facility], Division of Infectious Diseases  AUGUST Carbajal Y. Patel, S. Shah, G. Brian  [phone]  ([phone] - weekdays after 5pm and weekends)    Name: JIMMY BLAND  Age/Gender: 73y Male  MRN: [id number]    Interval History:  Notes reviewed.   Afebrile.   MRI w/ acute CVA    Allergies: No Known Allergies      Objective:  Vitals:   T(F): 96.6 (05-07-24 @ 11:05), Max: 100.3 (05-07-24 @ 04:00)  HR: 66 (05-07-24 @ 12:00) (54 - 71)  BP: 127/71 (05-07-24 @ 12:00) (91/58 - 164/75)  RR: 14 (05-07-24 @ 12:00) (14 - 20)  SpO2: 98% (05-07-24 @ 12:00) (93% - 100%)  Physical Examination:  General: no acute distress  HEENT: anicteric, ETT  Lungs: clear to auscultation anteriorly  Heart: S1, S2, normal rate. RIJ CVC  Abdomen: Soft. Nondistended.  Extremities: No LE edema.   Skin: Warm. Dry. No rash.    Laboratory Studies:  CBC:                       9.6    12.10 )-----------( 102      ( 07 May 2024 00:30 )             30.2     WBC Trend:  12.10 05-07-24 @ 00:30  16.47 05-06-24 @ 00:00  16.48 05-05-24 @ 16:30  18.14 05-05-24 @ 10:15  18.63 05-05-24 @ 03:10  23.05 05-04-24 @ 21:00  24.74 05-04-24 @ 14:55  25.40 05-04-24 @ 08:55  25.36 05-04-24 @ 04:00  22.65 05-04-24 @ 01:20  17.37 05-03-24 @ 21:20  12.26 05-03-24 @ 00:30  24.85 05-02-24 @ 03:37  13.32 05-01-24 @ 22:42  9.64 05-01-24 @ 05:48    CMP: 05-07    132<L>  |  95<L>  |  46<H>  ----------------------------<  253<H>  4.3   |  18<L>  |  3.77<H>    Ca    8.3<L>      07 May 2024 00:30  Phos  3.4     05-07  Mg     2.70     05-07    TPro  6.6  /  Alb  3.1<L>  /  TBili  0.6  /  DBili  x   /  AST  44<H>  /  ALT  32  /  AlkPhos  87  05-07      LIVER FUNCTIONS - ( 07 May 2024 00:30 )  Alb: 3.1 g/dL / Pro: 6.6 g/dL / ALK PHOS: 87 U/L / ALT: 32 U/L / AST: 44 U/L / GGT: x             Urinalysis Basic - ( 07 May 2024 00:30 )    Color: x / Appearance: x / SG: x / pH: x  Gluc: 253 mg/dL / Ketone: x  / Bili: x / Urobili: x   Blood: x / Protein: x / Nitrite: x   Leuk Esterase: x / RBC: x / WBC x   Sq Epi: x / Non Sq Epi: x / Bacteria: x      Microbiology: reviewed     Culture - Sputum (collected 05-04-24 @ 04:10)  Source: ET Tube ET Tube  Gram Stain (05-04-24 @ 13:40):    Few polymorphonuclear leukocytes seen per low power field    No Squamous epithelial cells per low power field    Numerous Gram Positive Rods seen per oil power field    Rare Gram Positive Cocci in Clusters seen per oil power field  Final Report (05-05-24 @ 17:14):    Normal Respiratory Jocelyn present    Culture - CSF with Gram Stain (collected 05-02-24 @ 12:15)  Source: .CSF CSF lumbar  Gram Stain (05-02-24 @ 13:51):    polymorphonuclear leukocytes    No organisms seen    by cytocentrifuge  Final Report (05-07-24 @ 08:05):    No growth at 5 days    Culture - Fungal, CSF (collected 05-02-24 @ 12:15)  Source: .CSF CSF lumbar  Preliminary Report (05-04-24 @ 23:03):    Culture is being performed. Fungal cultures are held for 4 weeks.    Culture - Blood (collected 05-02-24 @ 03:45)  Source: .Blood Blood-Peripheral  Final Report (05-07-24 @ 10:01):    No growth at 5 days    Culture - Blood (collected 05-02-24 @ 03:15)  Source: .Blood Blood-Venous  Final Report (05-07-24 @ 10:01):    No growth at 5 days        Radiology: reviewed     Medications:  aspirin  chewable 81 milliGRAM(s) Oral daily  chlorhexidine 0.12% Liquid 15 milliLiter(s) Oral Mucosa every 12 hours  chlorhexidine 2% Cloths 1 Application(s) Topical <User Schedule>  dextrose 10% Bolus 125 milliLiter(s) IV Bolus once  dextrose 5%. 1000 milliLiter(s) IV Continuous <Continuous>  dextrose 5%. 1000 milliLiter(s) IV Continuous <Continuous>  dextrose 50% Injectable 12.5 Gram(s) IV Push once  dextrose 50% Injectable 25 Gram(s) IV Push once  dextrose Oral Gel 15 Gram(s) Oral once PRN  epoetin reilly (EPOGEN) Injectable 13070 Unit(s) IV Push <User Schedule>  glucagon  Injectable 1 milliGRAM(s) IntraMuscular once  heparin   Injectable 5000 Unit(s) SubCutaneous every 12 hours  insulin lispro (ADMELOG) corrective regimen sliding scale   SubCutaneous every 6 hours  levETIRAcetam  IVPB 500 milliGRAM(s) IV Intermittent every 12 hours  petrolatum Ophthalmic Ointment 1 Application(s) Both EYES two times a day    Antimicrobials:

## 2024-05-07 NOTE — PROGRESS NOTE ADULT - ASSESSMENT
73M w/ PMHx hypertension, ESRD on HD via R radiocephalic fistula (MWF), DM, HTN, p/w hiccups and peaked T waves, admitted to MICU for c/f baclofen toxicity. Patient received 1x HD on admission. R femoral shiley removed 5/2, had 2 RRT for AMS and failed HD via AVF 5/3. S/p emergent R IJ shiley placement 5/3, started CRRT which is now being transitioned back to iHD. Vascular planning RUE fistulogram when medically optimized.     Recommendations:   - Patient is off pressors and CRRT but remains intubated  - Planning for RUE fistulogram when medically optimized/when patient is downgraded from ICU  - Global care per primary     Vascular Surgery  [id number]

## 2024-05-07 NOTE — PROGRESS NOTE ADULT - ASSESSMENT
73M w/ PMH of HTN, ESRD (MWF), IDDM, p/f chest pain c/b hiccups for the last 2 days undergoing ischemic evaluation per cardiology. Pt s/p RRT x 2 for AMS. MICU consulted and accepting for airway monitoring in setting of ? baclofen toxicity.     NEUROLOGY   #AMS  - patient with multiple RRTs for AMS  - CTH without acute findings   - may be due to baclofen toxicity  - LP negative  - cw HD/CRRT for concern of baclofen toxicity   - MRI pending   - Cont EEG, fu report with neuro   - continue to wean sedation as tolerated      CARDIOVASCULAR   #Angina  - patient admitted with chest pain and noted to have peaked T waves on admission   - TTE showing EF 72%  - EKG 5/3 demonstrating ST deviation noted on EKG  - Per cardiology, no plan for cath at this time  - s/p DAPT load 5/4  - will c/w ASA 81 mg daily with heparin gtt x 48 hours (now complete)  - repeat TTE (5/4) demonstrating no significant interval changes    #HTN  - will hold bp meds in the setting of hypotension    RESPIRATORY   - intubated due to inability to protect airway    GI/NUTRITION   #Gastroporesis   - hold reglan for now     /RENAL   #ESRD MWF  - R fem shiley removed 5/2  - placement of IJ shiley 5/3  - pending fistula study of RUE (failed previous HD session)  - will trial iHD with CRRT circuit clotted      INFECTIOUS DISEASE   #leukocytosis  - noted on repeat labs  - follow up infectious workup  - 5 day course of zosyn (5/3 - )    ENDOCRINE   #IDD  - cw ISS    HEMATOLOGY/ONCOLOGY  FELIX      DVT PPX  heparin SQ    ETHICS  Full code   73M w/ PMH of HTN, ESRD (MWF), IDDM, p/f chest pain c/b hiccups for the last 2 days undergoing ischemic evaluation per cardiology. Pt s/p RRT x 2 for AMS. MICU consulted and accepting for airway monitoring in setting of ? baclofen toxicity.     NEUROLOGY   #AMS  - patient with multiple RRTs for AMS  - CTH without acute findings   - may be due to baclofen toxicity  - LP negative  - cw HD/CRRT for concern of baclofen toxicity   - MRI pending   - Cont EEG, fu report with neuro   - continue to wean sedation as tolerated    #Pontine and Cerebellar strokes  - MR from 5/6 showing R pontine and cerebellar infarcts  - unclear timeline but may be inciting event for resp failure  - neurology following  - previously treated with Plavix, heparin, ASA for NSTEMI  - CTM      CARDIOVASCULAR   #Angina  - patient admitted with chest pain and noted to have peaked T waves on admission   - TTE showing EF 72%  - EKG 5/3 demonstrating ST deviation noted on EKG  - Per cardiology, no plan for cath at this time  - s/p DAPT load 5/4  - will c/w ASA 81 mg daily with heparin gtt x 48 hours (now complete)  - repeat TTE (5/4) demonstrating no significant interval changes    #HTN  - will hold bp meds in the setting of hypotension    RESPIRATORY   - intubated due to inability to protect airway    GI/NUTRITION   #Gastroporesis   - hold reglan for now     /RENAL   #ESRD MWF  - R fem shiley removed 5/2  - placement of IJ shiley 5/3  - pending fistula study of RUE (failed previous HD session)  - will trial iHD with CRRT circuit clotted      INFECTIOUS DISEASE   #leukocytosis  - noted on repeat labs  - follow up infectious workup  - 5 day course of zosyn (5/3 - )    ENDOCRINE   #IDD  - cw ISS    HEMATOLOGY/ONCOLOGY  FELIX      DVT PPX  heparin SQ    ETHICS  Full code

## 2024-05-07 NOTE — PROGRESS NOTE ADULT - SUBJECTIVE AND OBJECTIVE BOX
Patient is a 73y old  Male who presents with a chief complaint of Unstable angina, abn EKG (07 May 2024 15:52)      SUBJECTIVE / OVERNIGHT EVENTS: remains intubated, neuro recs are : dc keppra. acute CVA, appears embolic in nature, off AC, off pressors, off CRRT, on HD as per renal,     MEDICATIONS  (STANDING):  aspirin  chewable 81 milliGRAM(s) Oral daily  chlorhexidine 0.12% Liquid 15 milliLiter(s) Oral Mucosa every 12 hours  chlorhexidine 2% Cloths 1 Application(s) Topical <User Schedule>  dextrose 10% Bolus 125 milliLiter(s) IV Bolus once  dextrose 5%. 1000 milliLiter(s) (50 mL/Hr) IV Continuous <Continuous>  dextrose 5%. 1000 milliLiter(s) (100 mL/Hr) IV Continuous <Continuous>  dextrose 50% Injectable 25 Gram(s) IV Push once  dextrose 50% Injectable 12.5 Gram(s) IV Push once  epoetin reilly (EPOGEN) Injectable 04004 Unit(s) IV Push <User Schedule>  glucagon  Injectable 1 milliGRAM(s) IntraMuscular once  heparin   Injectable 5000 Unit(s) SubCutaneous every 12 hours  insulin lispro (ADMELOG) corrective regimen sliding scale   SubCutaneous every 6 hours  levETIRAcetam  IVPB 500 milliGRAM(s) IV Intermittent every 12 hours  petrolatum Ophthalmic Ointment 1 Application(s) Both EYES two times a day    MEDICATIONS  (PRN):  dextrose Oral Gel 15 Gram(s) Oral once PRN Blood Glucose LESS THAN 70 milliGRAM(s)/deciliter      Vital Signs Last 24 Hrs  T(F): 99.9 (05-07-24 @ 20:00), Max: 100.3 (05-07-24 @ 04:00)  HR: 74 (05-07-24 @ 20:00) (54 - 82)  BP: 106/67 (05-07-24 @ 20:00) (102/61 - 164/75)  RR: 14 (05-07-24 @ 20:00) (14 - 19)  SpO2: 99% (05-07-24 @ 20:00) (79% - 99%)  Telemetry:   CAPILLARY BLOOD GLUCOSE      POCT Blood Glucose.: 297 mg/dL (07 May 2024 18:04)  POCT Blood Glucose.: 224 mg/dL (07 May 2024 11:30)  POCT Blood Glucose.: 290 mg/dL (07 May 2024 05:19)    I&O's Summary    06 May 2024 07:01  -  07 May 2024 07:00  --------------------------------------------------------  IN: 545 mL / OUT: 0 mL / NET: 545 mL    07 May 2024 07:01  -  07 May 2024 23:29  --------------------------------------------------------  IN: 1035 mL / OUT: 1778 mL / NET: -743 mL        PHYSICAL EXAM:  GENERAL: NAD, well-developed  HEAD:  Atraumatic, Normocephalic  EYES: EOMI, PERRLA, conjunctiva and sclera clear  NECK: Supple, No JVD  CHEST/LUNG: Clear to auscultation bilaterally; No wheeze  HEART: Regular rate and rhythm; No murmurs, rubs, or gallops  ABDOMEN: Soft, Nontender, Nondistended; Bowel sounds present  EXTREMITIES:  2+ Peripheral Pulses, No clubbing, cyanosis, or edema  PSYCH: AAOx3  NEUROLOGY: non-focal  SKIN: No rashes or lesions    LABS:                        9.6    12.10 )-----------( 102      ( 07 May 2024 00:30 )             30.2     05-07    132<L>  |  95<L>  |  46<H>  ----------------------------<  253<H>  4.3   |  18<L>  |  3.77<H>    Ca    8.3<L>      07 May 2024 00:30  Phos  3.4     05-07  Mg     2.70     05-07    TPro  6.6  /  Alb  3.1<L>  /  TBili  0.6  /  DBili  x   /  AST  44<H>  /  ALT  32  /  AlkPhos  87  05-07    PT/INR - ( 07 May 2024 00:30 )   PT: 10.5 sec;   INR: 0.93 ratio               Urinalysis Basic - ( 07 May 2024 00:30 )    Color: x / Appearance: x / SG: x / pH: x  Gluc: 253 mg/dL / Ketone: x  / Bili: x / Urobili: x   Blood: x / Protein: x / Nitrite: x   Leuk Esterase: x / RBC: x / WBC x   Sq Epi: x / Non Sq Epi: x / Bacteria: x        RADIOLOGY & ADDITIONAL TESTS:    Imaging Personally Reviewed:    Consultant(s) Notes Reviewed:      Care Discussed with Consultants/Other Providers:

## 2024-05-07 NOTE — PROGRESS NOTE ADULT - ASSESSMENT
74 y/o M PMhx HTN, ESRD (on HD M/W/F), DM who presented with hiccups x 2 days and chest pain found to have peaked T waves. He was evaluated by cardiology and was planned for nuclear stress test. He was started on reglan for possible gastroparesis. Hospital course c/b AMS s/p RRT on 5/1 and 5/2. Vascular consulted 2/2 RUE AVF access unable to be used s/p R femoral shiley placed for emergent HD. Transferred to MICU and intubated 5/2 for airway protection.     AMS, leukocytosis, CVA  febrile on 5/3  no evidence of SSTI on exam  CXR- clear, no focal consolidation  TTE- no evidence of valvular disease  s/p LP 5/2, cell count not consistent w/ bacterial meningitis, CSF PCR- negative  MRI- Punctate foci of restricted diffusion in the left talya and left cerebellum with associated T2 prolongation consistent with acute infarcts    leukocytosis may be reactive to CVA  Recommendations  zosyn x 5 days  f/u pending CSF studies  f/u neurology recs    Steven Torres M.D.  [facility], Division of Infectious Diseases  [phone]  After 5pm on weekdays and all day on weekends - please call [phone]

## 2024-05-08 LAB
ALBUMIN SERPL ELPH-MCNC: 3.2 G/DL — LOW (ref 3.3–5)
ALP SERPL-CCNC: 120 U/L — SIGNIFICANT CHANGE UP (ref 40–120)
ALT FLD-CCNC: 37 U/L — SIGNIFICANT CHANGE UP (ref 4–41)
AMPA-R AB CBA, CSF: NEGATIVE — SIGNIFICANT CHANGE UP
AMPHIPHYSIN AB TITR CSF: NEGATIVE — SIGNIFICANT CHANGE UP
ANION GAP SERPL CALC-SCNC: 18 MMOL/L — HIGH (ref 7–14)
AST SERPL-CCNC: 48 U/L — HIGH (ref 4–40)
BASOPHILS # BLD AUTO: 0.02 K/UL — SIGNIFICANT CHANGE UP (ref 0–0.2)
BASOPHILS NFR BLD AUTO: 0.2 % — SIGNIFICANT CHANGE UP (ref 0–2)
BILIRUB SERPL-MCNC: 0.4 MG/DL — SIGNIFICANT CHANGE UP (ref 0.2–1.2)
BLOOD GAS VENOUS COMPREHENSIVE RESULT: SIGNIFICANT CHANGE UP
BUN SERPL-MCNC: 46 MG/DL — HIGH (ref 7–23)
CALCIUM SERPL-MCNC: 8.2 MG/DL — LOW (ref 8.4–10.5)
CASPR2-IGG CBA, CSF: NEGATIVE — SIGNIFICANT CHANGE UP
CHLORIDE SERPL-SCNC: 97 MMOL/L — LOW (ref 98–107)
CO2 SERPL-SCNC: 22 MMOL/L — SIGNIFICANT CHANGE UP (ref 22–31)
CREAT SERPL-MCNC: 3.94 MG/DL — HIGH (ref 0.5–1.3)
CV2 IGG TITR CSF: NEGATIVE — SIGNIFICANT CHANGE UP
DPPX ANTIBODY IFA, CSF: NEGATIVE — SIGNIFICANT CHANGE UP
EGFR: 16 ML/MIN/1.73M2 — LOW
EOSINOPHIL # BLD AUTO: 0.12 K/UL — SIGNIFICANT CHANGE UP (ref 0–0.5)
EOSINOPHIL NFR BLD AUTO: 1.5 % — SIGNIFICANT CHANGE UP (ref 0–6)
GABA-B-R AB CBA, CSF: NEGATIVE — SIGNIFICANT CHANGE UP
GAD65 AB CSF-SCNC: 0 NMOL/L — SIGNIFICANT CHANGE UP
GFAP IFA, CSF: NEGATIVE — SIGNIFICANT CHANGE UP
GLIAL NUC TYPE 1 AB TITR CSF: NEGATIVE — SIGNIFICANT CHANGE UP
GLUCOSE BLDC GLUCOMTR-MCNC: 195 MG/DL — HIGH (ref 70–99)
GLUCOSE BLDC GLUCOMTR-MCNC: 196 MG/DL — HIGH (ref 70–99)
GLUCOSE BLDC GLUCOMTR-MCNC: 229 MG/DL — HIGH (ref 70–99)
GLUCOSE BLDC GLUCOMTR-MCNC: 257 MG/DL — HIGH (ref 70–99)
GLUCOSE BLDC GLUCOMTR-MCNC: 293 MG/DL — HIGH (ref 70–99)
GLUCOSE SERPL-MCNC: 308 MG/DL — HIGH (ref 70–99)
GRAM STN FLD: SIGNIFICANT CHANGE UP
HCT VFR BLD CALC: 28.9 % — LOW (ref 39–50)
HGB BLD-MCNC: 9.6 G/DL — LOW (ref 13–17)
HU1 AB TITR CSF IF: NEGATIVE — SIGNIFICANT CHANGE UP
HU2 AB TITR CSF IF: NEGATIVE — SIGNIFICANT CHANGE UP
HU3 AB TITR CSF: NEGATIVE — SIGNIFICANT CHANGE UP
IANC: 6.37 K/UL — SIGNIFICANT CHANGE UP (ref 1.8–7.4)
IFA NOTES: SIGNIFICANT CHANGE UP
IGLON5 IFA, CSF: NEGATIVE — SIGNIFICANT CHANGE UP
IMM GRANULOCYTES NFR BLD AUTO: 0.6 % — SIGNIFICANT CHANGE UP (ref 0–0.9)
IMMUNOLOGIST REVIEW: SIGNIFICANT CHANGE UP
INR BLD: 0.99 RATIO — SIGNIFICANT CHANGE UP (ref 0.85–1.18)
LGI1-IGG CBA, CSF: NEGATIVE — SIGNIFICANT CHANGE UP
LYMPHOCYTES # BLD AUTO: 0.65 K/UL — LOW (ref 1–3.3)
LYMPHOCYTES # BLD AUTO: 8 % — LOW (ref 13–44)
MAGNESIUM SERPL-MCNC: 2.7 MG/DL — HIGH (ref 1.6–2.6)
MCHC RBC-ENTMCNC: 20.7 PG — LOW (ref 27–34)
MCHC RBC-ENTMCNC: 33.2 GM/DL — SIGNIFICANT CHANGE UP (ref 32–36)
MCV RBC AUTO: 62.4 FL — LOW (ref 80–100)
MGLUR1 AB IFA, CSF: NEGATIVE — SIGNIFICANT CHANGE UP
MONOCYTES # BLD AUTO: 0.93 K/UL — HIGH (ref 0–0.9)
MONOCYTES NFR BLD AUTO: 11.4 % — SIGNIFICANT CHANGE UP (ref 2–14)
NEUTROPHILS # BLD AUTO: 6.37 K/UL — SIGNIFICANT CHANGE UP (ref 1.8–7.4)
NEUTROPHILS NFR BLD AUTO: 78.3 % — HIGH (ref 43–77)
NRBC # BLD: 0 /100 WBCS — SIGNIFICANT CHANGE UP (ref 0–0)
NRBC # FLD: 0.04 K/UL — HIGH (ref 0–0)
PCA-TR AB TITR CSF: NEGATIVE — SIGNIFICANT CHANGE UP
PHOSPHATE SERPL-MCNC: 2.1 MG/DL — LOW (ref 2.5–4.5)
PLATELET # BLD AUTO: 118 K/UL — LOW (ref 150–400)
POTASSIUM SERPL-MCNC: 3.7 MMOL/L — SIGNIFICANT CHANGE UP (ref 3.5–5.3)
POTASSIUM SERPL-SCNC: 3.7 MMOL/L — SIGNIFICANT CHANGE UP (ref 3.5–5.3)
PROT SERPL-MCNC: 6.5 G/DL — SIGNIFICANT CHANGE UP (ref 6–8.3)
PROTHROM AB SERPL-ACNC: 11.2 SEC — SIGNIFICANT CHANGE UP (ref 9.5–13)
PURKINJE CELL CYTOPLASMIC AB TYPE 2: NEGATIVE — SIGNIFICANT CHANGE UP
PURKINJE CELLS AB TITR CSF IF: NEGATIVE — SIGNIFICANT CHANGE UP
RBC # BLD: 4.63 M/UL — SIGNIFICANT CHANGE UP (ref 4.2–5.8)
RBC # FLD: 20.7 % — HIGH (ref 10.3–14.5)
SODIUM SERPL-SCNC: 137 MMOL/L — SIGNIFICANT CHANGE UP (ref 135–145)
SPECIMEN SOURCE: SIGNIFICANT CHANGE UP
WBC # BLD: 8.14 K/UL — SIGNIFICANT CHANGE UP (ref 3.8–10.5)
WBC # FLD AUTO: 8.14 K/UL — SIGNIFICANT CHANGE UP (ref 3.8–10.5)

## 2024-05-08 PROCEDURE — 99291 CRITICAL CARE FIRST HOUR: CPT | Mod: GC

## 2024-05-08 RX ORDER — HUMAN INSULIN 100 [IU]/ML
3 INJECTION, SUSPENSION SUBCUTANEOUS EVERY 6 HOURS
Refills: 0 | Status: DISCONTINUED | OUTPATIENT
Start: 2024-05-08 | End: 2024-05-08

## 2024-05-08 RX ORDER — HUMAN INSULIN 100 [IU]/ML
6 INJECTION, SUSPENSION SUBCUTANEOUS EVERY 6 HOURS
Refills: 0 | Status: DISCONTINUED | OUTPATIENT
Start: 2024-05-08 | End: 2024-05-09

## 2024-05-08 RX ORDER — POTASSIUM PHOSPHATE, MONOBASIC POTASSIUM PHOSPHATE, DIBASIC 236; 224 MG/ML; MG/ML
30 INJECTION, SOLUTION INTRAVENOUS ONCE
Refills: 0 | Status: COMPLETED | OUTPATIENT
Start: 2024-05-08 | End: 2024-05-08

## 2024-05-08 RX ADMIN — HUMAN INSULIN 6 UNIT(S): 100 INJECTION, SUSPENSION SUBCUTANEOUS at 11:45

## 2024-05-08 RX ADMIN — HUMAN INSULIN 6 UNIT(S): 100 INJECTION, SUSPENSION SUBCUTANEOUS at 23:35

## 2024-05-08 RX ADMIN — Medication 81 MILLIGRAM(S): at 14:01

## 2024-05-08 RX ADMIN — HUMAN INSULIN 3 UNIT(S): 100 INJECTION, SUSPENSION SUBCUTANEOUS at 02:36

## 2024-05-08 RX ADMIN — Medication 4: at 06:23

## 2024-05-08 RX ADMIN — Medication 1 APPLICATION(S): at 17:37

## 2024-05-08 RX ADMIN — CHLORHEXIDINE GLUCONATE 15 MILLILITER(S): 213 SOLUTION TOPICAL at 06:22

## 2024-05-08 RX ADMIN — HUMAN INSULIN 6 UNIT(S): 100 INJECTION, SUSPENSION SUBCUTANEOUS at 17:37

## 2024-05-08 RX ADMIN — POTASSIUM PHOSPHATE, MONOBASIC POTASSIUM PHOSPHATE, DIBASIC 83.33 MILLIMOLE(S): 236; 224 INJECTION, SOLUTION INTRAVENOUS at 05:05

## 2024-05-08 RX ADMIN — Medication 6: at 17:37

## 2024-05-08 RX ADMIN — HEPARIN SODIUM 5000 UNIT(S): 5000 INJECTION INTRAVENOUS; SUBCUTANEOUS at 17:36

## 2024-05-08 RX ADMIN — Medication 1 APPLICATION(S): at 06:22

## 2024-05-08 RX ADMIN — CHLORHEXIDINE GLUCONATE 1 APPLICATION(S): 213 SOLUTION TOPICAL at 06:21

## 2024-05-08 RX ADMIN — Medication 2: at 23:36

## 2024-05-08 RX ADMIN — HEPARIN SODIUM 5000 UNIT(S): 5000 INJECTION INTRAVENOUS; SUBCUTANEOUS at 06:22

## 2024-05-08 RX ADMIN — Medication 2: at 11:45

## 2024-05-08 RX ADMIN — CHLORHEXIDINE GLUCONATE 15 MILLILITER(S): 213 SOLUTION TOPICAL at 17:36

## 2024-05-08 RX ADMIN — Medication 6: at 02:14

## 2024-05-08 NOTE — PROGRESS NOTE ADULT - SUBJECTIVE AND OBJECTIVE BOX
Patient is a 73y old  Male who presents with a chief complaint of Unstable angina, abn EKG (08 May 2024 13:43)      SUBJECTIVE / OVERNIGHT EVENTS: ptn remains in MICU intubated, off sedation, responds appropriately, on HD, leukocytosis resolved, completed ABx on 5/7 ( Zosyn), MRI on 5/6 c/w L pontine and cerebellar CVA, prob embolic in nature as per neuro. HDS.     MEDICATIONS  (STANDING):  aspirin  chewable 81 milliGRAM(s) Oral daily  chlorhexidine 0.12% Liquid 15 milliLiter(s) Oral Mucosa every 12 hours  chlorhexidine 2% Cloths 1 Application(s) Topical <User Schedule>  dextrose 10% Bolus 125 milliLiter(s) IV Bolus once  dextrose 5%. 1000 milliLiter(s) (100 mL/Hr) IV Continuous <Continuous>  dextrose 5%. 1000 milliLiter(s) (50 mL/Hr) IV Continuous <Continuous>  dextrose 50% Injectable 12.5 Gram(s) IV Push once  dextrose 50% Injectable 25 Gram(s) IV Push once  epoetin reilly (EPOGEN) Injectable 74684 Unit(s) IV Push <User Schedule>  glucagon  Injectable 1 milliGRAM(s) IntraMuscular once  heparin   Injectable 5000 Unit(s) SubCutaneous every 12 hours  insulin lispro (ADMELOG) corrective regimen sliding scale   SubCutaneous every 6 hours  insulin NPH human recombinant 6 Unit(s) SubCutaneous every 6 hours  petrolatum Ophthalmic Ointment 1 Application(s) Both EYES two times a day    MEDICATIONS  (PRN):  dextrose Oral Gel 15 Gram(s) Oral once PRN Blood Glucose LESS THAN 70 milliGRAM(s)/deciliter      Vital Signs Last 24 Hrs  T(F): 99.5 (05-08-24 @ 16:00), Max: 99.5 (05-08-24 @ 16:00)  HR: 70 (05-08-24 @ 19:32) (61 - 83)  BP: 127/61 (05-08-24 @ 18:00) (119/70 - 172/76)  RR: 14 (05-08-24 @ 18:00) (10 - 16)  SpO2: 97% (05-08-24 @ 19:32) (94% - 100%)  Telemetry:   CAPILLARY BLOOD GLUCOSE      POCT Blood Glucose.: 257 mg/dL (08 May 2024 17:20)  POCT Blood Glucose.: 195 mg/dL (08 May 2024 11:42)  POCT Blood Glucose.: 229 mg/dL (08 May 2024 06:01)  POCT Blood Glucose.: 293 mg/dL (08 May 2024 01:30)    I&O's Summary    07 May 2024 07:01  -  08 May 2024 07:00  --------------------------------------------------------  IN: 1950 mL / OUT: 1778 mL / NET: 172 mL    08 May 2024 07:01  -  08 May 2024 20:01  --------------------------------------------------------  IN: 315 mL / OUT: 0 mL / NET: 315 mL        PHYSICAL EXAM:  GENERAL: NAD, well-developed  HEAD:  Atraumatic, Normocephalic  EYES: EOMI, PERRLA, conjunctiva and sclera clear  NECK: Supple, No JVD  CHEST/LUNG: Clear to auscultation bilaterally; No wheeze  HEART: Regular rate and rhythm; No murmurs, rubs, or gallops  ABDOMEN: Soft, Nontender, Nondistended; Bowel sounds present  EXTREMITIES:  2+ Peripheral Pulses, No clubbing, cyanosis, or edema  PSYCH: AAOx3  NEUROLOGY: non-focal  SKIN: No rashes or lesions    LABS:                        9.6    8.14  )-----------( 118      ( 08 May 2024 01:33 )             28.9     05-08    137  |  97<L>  |  46<H>  ----------------------------<  308<H>  3.7   |  22  |  3.94<H>    Ca    8.2<L>      08 May 2024 01:33  Phos  2.1     05-08  Mg     2.70     05-08    TPro  6.5  /  Alb  3.2<L>  /  TBili  0.4  /  DBili  x   /  AST  48<H>  /  ALT  37  /  AlkPhos  120  05-08    PT/INR - ( 08 May 2024 01:33 )   PT: 11.2 sec;   INR: 0.99 ratio               Urinalysis Basic - ( 08 May 2024 01:33 )    Color: x / Appearance: x / SG: x / pH: x  Gluc: 308 mg/dL / Ketone: x  / Bili: x / Urobili: x   Blood: x / Protein: x / Nitrite: x   Leuk Esterase: x / RBC: x / WBC x   Sq Epi: x / Non Sq Epi: x / Bacteria: x        RADIOLOGY & ADDITIONAL TESTS:    Imaging Personally Reviewed:    Consultant(s) Notes Reviewed:      Care Discussed with Consultants/Other Providers:

## 2024-05-08 NOTE — PROGRESS NOTE ADULT - SUBJECTIVE AND OBJECTIVE BOX
[facility], Division of Infectious Diseases  AUGUST Carbajal Y. Patel, S. Shah, G. Brian  [phone]  ([phone] - weekdays after 5pm and weekends)    Name: JIMMY BLAND  Age/Gender: 73y Male  MRN: [id number]    Interval History:  Notes reviewed.   No concerning overnight events.  Afebrile.   remains intubated    Allergies: No Known Allergies      Objective:  Vitals:   T(F): 99.2 (05-08-24 @ 08:00), Max: 99.9 (05-07-24 @ 20:00)  HR: 64 (05-08-24 @ 10:24) (62 - 83)  BP: 150/73 (05-08-24 @ 10:00) (102/61 - 172/76)  RR: 10 (05-08-24 @ 10:00) (10 - 19)  SpO2: 98% (05-08-24 @ 10:24) (79% - 99%)  Physical Examination:  General: no acute distress  HEENT: anicteric, ETT  Lungs: clear to auscultation anteriorly  Heart: S1, S2, normal rate. RIJ CVC  Abdomen: Soft. Nondistended.  Extremities: No LE edema.   Skin: Warm. Dry.     Laboratory Studies:  CBC:                       9.6    8.14  )-----------( 118      ( 08 May 2024 01:33 )             28.9     WBC Trend:  8.14 05-08-24 @ 01:33  12.10 05-07-24 @ 00:30  16.47 05-06-24 @ 00:00  16.48 05-05-24 @ 16:30  18.14 05-05-24 @ 10:15  18.63 05-05-24 @ 03:10  23.05 05-04-24 @ 21:00  24.74 05-04-24 @ 14:55  25.40 05-04-24 @ 08:55  25.36 05-04-24 @ 04:00  22.65 05-04-24 @ 01:20  17.37 05-03-24 @ 21:20  12.26 05-03-24 @ 00:30  24.85 05-02-24 @ 03:37  13.32 05-01-24 @ 22:42    CMP: 05-08    137  |  97<L>  |  46<H>  ----------------------------<  308<H>  3.7   |  22  |  3.94<H>    Ca    8.2<L>      08 May 2024 01:33  Phos  2.1     05-08  Mg     2.70     05-08    TPro  6.5  /  Alb  3.2<L>  /  TBili  0.4  /  DBili  x   /  AST  48<H>  /  ALT  37  /  AlkPhos  120  05-08      LIVER FUNCTIONS - ( 08 May 2024 01:33 )  Alb: 3.2 g/dL / Pro: 6.5 g/dL / ALK PHOS: 120 U/L / ALT: 37 U/L / AST: 48 U/L / GGT: x             Urinalysis Basic - ( 08 May 2024 01:33 )    Color: x / Appearance: x / SG: x / pH: x  Gluc: 308 mg/dL / Ketone: x  / Bili: x / Urobili: x   Blood: x / Protein: x / Nitrite: x   Leuk Esterase: x / RBC: x / WBC x   Sq Epi: x / Non Sq Epi: x / Bacteria: x      Microbiology: reviewed     Culture - Sputum (collected 05-04-24 @ 04:10)  Source: ET Tube ET Tube  Gram Stain (05-04-24 @ 13:40):    Few polymorphonuclear leukocytes seen per low power field    No Squamous epithelial cells per low power field    Numerous Gram Positive Rods seen per oil power field    Rare Gram Positive Cocci in Clusters seen per oil power field  Final Report (05-05-24 @ 17:14):    Normal Respiratory Jocelyn present    Culture - Fungal, CSF (collected 05-02-24 @ 12:15)  Source: .CSF CSF lumbar  Preliminary Report (05-04-24 @ 23:03):    Culture is being performed. Fungal cultures are held for 4 weeks.    Culture - CSF with Gram Stain (collected 05-02-24 @ 12:15)  Source: .CSF CSF lumbar  Gram Stain (05-02-24 @ 13:51):    polymorphonuclear leukocytes    No organisms seen    by cytocentrifuge  Final Report (05-07-24 @ 08:05):    No growth at 5 days    Culture - Blood (collected 05-02-24 @ 03:45)  Source: .Blood Blood-Peripheral  Final Report (05-07-24 @ 10:01):    No growth at 5 days    Culture - Blood (collected 05-02-24 @ 03:15)  Source: .Blood Blood-Venous  Final Report (05-07-24 @ 10:01):    No growth at 5 days        Radiology: reviewed     Medications:  aspirin  chewable 81 milliGRAM(s) Oral daily  chlorhexidine 0.12% Liquid 15 milliLiter(s) Oral Mucosa every 12 hours  chlorhexidine 2% Cloths 1 Application(s) Topical <User Schedule>  dextrose 10% Bolus 125 milliLiter(s) IV Bolus once  dextrose 5%. 1000 milliLiter(s) IV Continuous <Continuous>  dextrose 5%. 1000 milliLiter(s) IV Continuous <Continuous>  dextrose 50% Injectable 12.5 Gram(s) IV Push once  dextrose 50% Injectable 25 Gram(s) IV Push once  dextrose Oral Gel 15 Gram(s) Oral once PRN  epoetin reilly (EPOGEN) Injectable 75271 Unit(s) IV Push <User Schedule>  glucagon  Injectable 1 milliGRAM(s) IntraMuscular once  heparin   Injectable 5000 Unit(s) SubCutaneous every 12 hours  insulin lispro (ADMELOG) corrective regimen sliding scale   SubCutaneous every 6 hours  insulin NPH human recombinant 6 Unit(s) SubCutaneous every 6 hours  petrolatum Ophthalmic Ointment 1 Application(s) Both EYES two times a day    Antimicrobials:

## 2024-05-08 NOTE — PROGRESS NOTE ADULT - SUBJECTIVE AND OBJECTIVE BOX
Vascular Surgery Progress Note     Subjective:  Patient seen and examined on AM rounds. Patient remains intubated, awake and responds to voice. Following commands UE, not on LE.      Vital Signs:  Vital Signs Last 24 Hrs  T(C): 37.2 (08 May 2024 04:00), Max: 37.7 (07 May 2024 20:00)  T(F): 99 (08 May 2024 04:00), Max: 99.9 (07 May 2024 20:00)  HR: 64 (08 May 2024 08:00) (54 - 83)  BP: 141/76 (08 May 2024 08:00) (102/61 - 172/76)  BP(mean): 94 (08 May 2024 08:00) (73 - 103)  RR: 14 (08 May 2024 08:00) (12 - 19)  SpO2: 97% (08 May 2024 08:00) (79% - 99%)    Parameters below as of 08 May 2024 08:00  Patient On (Oxygen Delivery Method): ventilator    O2 Concentration (%): 21    CAPILLARY BLOOD GLUCOSE      POCT Blood Glucose.: 229 mg/dL (08 May 2024 06:01)  POCT Blood Glucose.: 293 mg/dL (08 May 2024 01:30)  POCT Blood Glucose.: 297 mg/dL (07 May 2024 18:04)  POCT Blood Glucose.: 224 mg/dL (07 May 2024 11:30)      I&O's Detail    07 May 2024 07:01  -  08 May 2024 07:00  --------------------------------------------------------  IN:    IV PiggyBack: 700 mL    Nepro with Carb Steady: 650 mL    Other (mL): 600 mL  Total IN: 1950 mL    OUT:    Other (mL): 1778 mL    Voided (mL): 0 mL  Total OUT: 1778 mL    Total NET: 172 mL            Physical Exam:  General: NAD, sedated but opens eyes to voice  HEENT: Normocephalic atraumatic  Respiratory: Intubated on vent  Cardio: regular rate  Vascular: extremities are warm and well perfused, palpable b/l radial pulses, R AVF w/ palpable thrill, R IJ shiley in place      Labs:    05-08    137  |  97<L>  |  46<H>  ----------------------------<  308<H>  3.7   |  22  |  3.94<H>    Ca    8.2<L>      08 May 2024 01:33  Phos  2.1     05-08  Mg     2.70     05-08    TPro  6.5  /  Alb  3.2<L>  /  TBili  0.4  /  DBili  x   /  AST  48<H>  /  ALT  37  /  AlkPhos  120  05-08    LIVER FUNCTIONS - ( 08 May 2024 01:33 )  Alb: 3.2 g/dL / Pro: 6.5 g/dL / ALK PHOS: 120 U/L / ALT: 37 U/L / AST: 48 U/L / GGT: x                                 9.6    8.14  )-----------( 118      ( 08 May 2024 01:33 )             28.9     PT/INR - ( 08 May 2024 01:33 )   PT: 11.2 sec;   INR: 0.99 ratio

## 2024-05-08 NOTE — PROGRESS NOTE ADULT - ASSESSMENT
73M w/ PMH of HTN, ESRD (MWF), IDDM, p/f chest pain c/b hiccups for the last 2 days undergoing ischemic evaluation per cardiology. Pt s/p RRT x 2 for AMS. MICU consulted and accepting for airway monitoring in setting of ? baclofen toxicity.     NEUROLOGY   #AMS  - patient with multiple RRTs for AMS  - CTH without acute findings   - may be due to baclofen toxicity  - LP negative  - cw HD/CRRT for concern of baclofen toxicity   - MRI showing R pontine and cerebellar stroke  - EEG negative  - weaned off sedation and now answering questions appropriately.     #Pontine and Cerebellar strokes  - MR from 5/6 showing R pontine and cerebellar infarcts  - unclear timeline but may be inciting event for resp failure  - neurology following  - previously treated with Plavix, heparin, ASA for NSTEMI  - will need either CTA head and and neck (coordinated with HD) or MRA head and neck    CARDIOVASCULAR   #Angina  - patient admitted with chest pain and noted to have peaked T waves on admission   - TTE showing EF 72%  - EKG 5/3 demonstrating ST deviation noted on EKG  - Per cardiology, no plan for cath at this time  - s/p DAPT load 5/4  - will c/w ASA 81 mg daily with heparin gtt x 48 hours (now complete)  - repeat TTE (5/4) demonstrating no significant interval changes    #HTN  - will hold bp meds in the setting of hypotension    RESPIRATORY   - intubated due to inability to protect airway    GI/NUTRITION   #Gastroporesis   - hold reglan for now     /RENAL   #ESRD MWF  - R fem shiley removed 5/2  - placement of IJ shiley 5/3  - pending fistula study of RUE (failed previous HD session)  - tolerating HD; plan for 2L removed today.       INFECTIOUS DISEASE   #leukocytosis  - noted on repeat labs  - follow up infectious workup  - 5 day course of zosyn (5/3 - 5/8)    ENDOCRINE   #IDD  - cw ISS    HEMATOLOGY/ONCOLOGY  FELIX      DVT PPX  heparin SQ    ETHICS  Full code   73M w/ PMH of HTN, ESRD (MWF), IDDM, p/f chest pain c/b hiccups for the last 2 days undergoing ischemic evaluation per cardiology. Pt s/p RRT x 2 for AMS. MICU consulted and accepting for airway monitoring in setting of ? baclofen toxicity.     NEUROLOGY   #AMS  - patient with multiple RRTs for AMS  - CTH without acute findings   - may be due to baclofen toxicity  - LP negative  - cw HD/CRRT for concern of baclofen toxicity   - MRI showing R pontine and cerebellar stroke  - EEG negative  - weaned off sedation and now answering questions appropriately.     #Pontine and Cerebellar strokes  - MR from 5/6 showing R pontine and cerebellar infarcts  - unclear timeline but may be inciting event for resp failure  - neurology following  - previously treated with Plavix, heparin, ASA for NSTEMI  - will need either CTA head and and neck (coordinated with HD) or MRA head and neck    CARDIOVASCULAR   #Angina  - patient admitted with chest pain and noted to have peaked T waves on admission   - TTE showing EF 72%  - EKG 5/3 demonstrating ST deviation noted on EKG  - Per cardiology, no plan for cath at this time  - s/p DAPT load 5/4  - will c/w ASA 81 mg daily with heparin gtt x 48 hours (now complete)  - repeat TTE (5/4) demonstrating no significant interval changes    #HTN  - will hold bp meds in the setting of hypotension    RESPIRATORY   - intubated due to inability to protect airway    GI/NUTRITION   #Gastroporesis   - hold reglan for now     /RENAL   #ESRD MWF  - R fem shiley removed 5/2  - placement of IJ shiley 5/3  - pending fistula study of RUE (failed previous HD session)  - tolerating HD; plan for 2L removed today.       INFECTIOUS DISEASE   #leukocytosis  - noted on repeat labs  - follow up infectious workup  - 5 day course of zosyn (5/3 - 5/8)    ENDOCRINE   #IDD  - NPH 6u q6hrs  - cw ISS    HEMATOLOGY/ONCOLOGY  FELIX      DVT PPX  heparin SQ    ETHICS  Full code   73M w/ PMH of HTN, ESRD (MWF), IDDM, p/f chest pain c/b hiccups for the last 2 days undergoing ischemic evaluation per cardiology. Pt s/p RRT x 2 for AMS. MICU consulted and accepting for airway monitoring in setting of ? baclofen toxicity.     NEUROLOGY   #AMS  - patient with multiple RRTs for AMS  - CTH without acute findings   - may be due to baclofen toxicity  - LP negative  - cw HD/CRRT for concern of baclofen toxicity   - MRI showing L talya and L cerebellar stroke  - EEG negative  - weaned off sedation and now answering questions appropriately.     #Pontine and Cerebellar strokes  - MR from 5/6 showing R pontine and cerebellar infarcts  - unclear timeline but may be inciting event for resp failure  - neurology following  - previously treated with Plavix, heparin, ASA for NSTEMI  - will need either CTA head and and neck (coordinated with HD) or MRA head and neck    CARDIOVASCULAR   #Angina  - patient admitted with chest pain and noted to have peaked T waves on admission   - TTE showing EF 72%  - EKG 5/3 demonstrating ST deviation noted on EKG  - Per cardiology, no plan for cath at this time  - s/p DAPT load 5/4  - will c/w ASA 81 mg daily with heparin gtt x 48 hours (now complete)  - repeat TTE (5/4) demonstrating no significant interval changes    #HTN  - will hold bp meds in the setting of hypotension    RESPIRATORY   - intubated due to inability to protect airway    GI/NUTRITION   #Gastroporesis   - hold reglan for now     /RENAL   #ESRD MWF  - R fem shiley removed 5/2  - placement of IJ shiley 5/3  - pending fistula study of RUE (failed previous HD session)  - tolerating HD; plan for 2L removed today.       INFECTIOUS DISEASE   #leukocytosis  - noted on repeat labs  - follow up infectious workup  - 5 day course of zosyn (5/3 - 5/8)    ENDOCRINE   #IDD  - NPH 6u q6hrs  - cw ISS    HEMATOLOGY/ONCOLOGY  FELIX      DVT PPX  heparin SQ    ETHICS  Full code

## 2024-05-08 NOTE — PROGRESS NOTE ADULT - ASSESSMENT
73M w/ PMHx hypertension, ESRD on HD via R radiocephalic fistula (MWF), DM, HTN, p/w hiccups and peaked T waves, admitted to MICU for c/f baclofen toxicity. Patient received 1x HD on admission. R femoral shiley removed 5/2, had 2 RRT for AMS and failed HD via AVF 5/3. S/p emergent R IJ shiley placement 5/3, started CRRT which is now transitioned back to iHD. Vascular planning RUE fistulogram when medically optimized.     Recommendations:   - Patient is off pressors and CRRT but remains intubated  - Planning for RUE fistulogram when medically optimized/when patient is downgraded from ICU  - Global care per primary     Vascular Surgery  [id number]

## 2024-05-08 NOTE — PROGRESS NOTE ADULT - SUBJECTIVE AND OBJECTIVE BOX
Cardiovascular Disease Progress Note  DATE OF SERVICE: 24 @ 07:42    Overnight events: No acute events overnight.    The patient remains intubated.        Objective Findings:  T(C): 37.2 (24 @ 04:00), Max: 37.7 (24 @ 20:00)  HR: 67 (24 @ 07:03) (54 - 83)  BP: 149/74 (24 @ 07:00) (102/61 - 172/76)  RR: 14 (24 @ 07:00) (12 - 19)  SpO2: 98% (24 @ 07:03) (79% - 99%)  Wt(kg): --  Daily     Daily Weight in k.1 (08 May 2024 04:00)      Physical Exam:  Gen: NAD; Patient resting comfortably  HEENT:   Normocephalic/ atraumatic  CV: RRR, normal S1 + S2, no m/r/g  Lungs:  Normal respiratory effort; mechanical ventilation via ETT  Abd: soft, non-tender; bowel sounds present  Ext: No edema; warm and well perfused    Telemetry: Sinus; no ectopy    Laboratory Data:                        9.6    8.14  )-----------( 118      ( 08 May 2024 01:33 )             28.9         137  |  97<L>  |  46<H>  ----------------------------<  308<H>  3.7   |  22  |  3.94<H>    Ca    8.2<L>      08 May 2024 01:33  Phos  2.1       Mg     2.70         TPro  6.5  /  Alb  3.2<L>  /  TBili  0.4  /  DBili  x   /  AST  48<H>  /  ALT  37  /  AlkPhos  120      PT/INR - ( 08 May 2024 01:33 )   PT: 11.2 sec;   INR: 0.99 ratio                   Inpatient Medications:  MEDICATIONS  (STANDING):  aspirin  chewable 81 milliGRAM(s) Oral daily  chlorhexidine 0.12% Liquid 15 milliLiter(s) Oral Mucosa every 12 hours  chlorhexidine 2% Cloths 1 Application(s) Topical <User Schedule>  dextrose 10% Bolus 125 milliLiter(s) IV Bolus once  dextrose 5%. 1000 milliLiter(s) (50 mL/Hr) IV Continuous <Continuous>  dextrose 5%. 1000 milliLiter(s) (100 mL/Hr) IV Continuous <Continuous>  dextrose 50% Injectable 12.5 Gram(s) IV Push once  dextrose 50% Injectable 25 Gram(s) IV Push once  epoetin reilly (EPOGEN) Injectable 08836 Unit(s) IV Push <User Schedule>  glucagon  Injectable 1 milliGRAM(s) IntraMuscular once  heparin   Injectable 5000 Unit(s) SubCutaneous every 12 hours  insulin lispro (ADMELOG) corrective regimen sliding scale   SubCutaneous every 6 hours  insulin NPH human recombinant 3 Unit(s) SubCutaneous every 6 hours  petrolatum Ophthalmic Ointment 1 Application(s) Both EYES two times a day      Assessment: 73 year old man with HTN, HLD, T2DM on insulin, and ESRD on HD presents with supply demand ischemia and angina.    Plan of Care:    #Type II myocardial infarction-  Secondary to distributive shock.   ST deviation noted on EKG, however interpretation is limited by LVH with repolarization changes.  The repeat echo shows no new wall motion abnormality.   I would not pursue cath at this time given mental status changes.   Medical management of NSTEMI. .  ASA 81 mg daily         #Sinus bradycardia-  No evidence of AV block on admission EKG or telemetry.  No indication for pacing at this time.     #HTN-  Recent shock.  Pressors now off    #ESRD-  CRRT in the MICU.       Patient critically ill in the MICU         Over 51 minutes of critical care time spent on total encounter; more than 50% of the visit was spent counseling and/or coordinating care by the attending physician.      Geovani Bravo MD [facility]  Cardiovascular Disease  [phone]

## 2024-05-08 NOTE — PROGRESS NOTE ADULT - SUBJECTIVE AND OBJECTIVE BOX
Progress Note    JIMMY Glasgow MD 73y (1950) Male [id number]  04-29-24 (9d)      Chief Complaint: Unstable angina, abn EKG    Subjective:  No acute events overnight. Patient seen and examined at bedside. Shaking head yes/no to questions and denies any pain.     Review of Systems:  Denies any pain; intubated.     PAST MEDICAL & SURGICAL HISTORY:  Diabetes [250.00]    Stage 5 chronic kidney disease not on chronic dialysis [N18.5]    Benign essential HTN [I10]    HLD (hyperlipidemia) [E78.5]    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 [N18.6]    ESRD on hemodialysis [N18.6]    No significant past surgical history [443120030]    AVF (arteriovenous fistula) [I77.0]    Refined to: Arteriovenous fistula    Arteriovenous fistula [I77.0]      aspirin  chewable 81 milliGRAM(s) Oral daily  chlorhexidine 0.12% Liquid 15 milliLiter(s) Oral Mucosa every 12 hours  chlorhexidine 2% Cloths 1 Application(s) Topical <User Schedule>  dextrose 10% Bolus 125 milliLiter(s) IV Bolus once  dextrose 5%. 1000 milliLiter(s) IV Continuous <Continuous>  dextrose 5%. 1000 milliLiter(s) IV Continuous <Continuous>  dextrose 50% Injectable 25 Gram(s) IV Push once  dextrose 50% Injectable 12.5 Gram(s) IV Push once  dextrose Oral Gel 15 Gram(s) Oral once PRN  epoetin reilly (EPOGEN) Injectable 54863 Unit(s) IV Push <User Schedule>  glucagon  Injectable 1 milliGRAM(s) IntraMuscular once  heparin   Injectable 5000 Unit(s) SubCutaneous every 12 hours  insulin lispro (ADMELOG) corrective regimen sliding scale   SubCutaneous every 6 hours  insulin NPH human recombinant 6 Unit(s) SubCutaneous every 6 hours  petrolatum Ophthalmic Ointment 1 Application(s) Both EYES two times a day    Objective:  T(C): 37.3 (05-08-24 @ 12:00), Max: 37.7 (05-07-24 @ 20:00)  HR: 65 (05-08-24 @ 13:00) (61 - 83)  BP: 123/67 (05-08-24 @ 13:00) (104/59 - 172/76)  RR: 14 (05-08-24 @ 13:00) (10 - 19)  SpO2: 97% (05-08-24 @ 13:00) (79% - 100%)    Physical exam:  GENERAL: NAD, well-developed  HEAD:  Atraumatic, Normocephalic  EYES: EOMI, PERRLA, conjunctiva and sclera clear  NECK: Supple, No JVD  CHEST/LUNG: Clear to auscultation bilaterally; No wheeze  HEART: Regular rate and rhythm; No murmurs, rubs, or gallops  ABDOMEN: Soft, Nontender, Nondistended; Bowel sounds present  EXTREMITIES:  2+ Peripheral Pulses, No clubbing, cyanosis, or edema  PSYCH: AAOx1, intubated  NEUROLOGY: unable to move right hand. Poor inspiratory effort and prolonged apneic periods with ventillator pause.   SKIN: No rashes or lesions      05-07-24 @ 07:01  -  05-08-24 @ 07:00  --------------------------------------------------------  IN: 1950 mL / OUT: 1778 mL / NET: 172 mL    05-08-24 @ 07:01  -  05-08-24 @ 13:44  --------------------------------------------------------  IN: 90 mL / OUT: 0 mL / NET: 90 mL        CAPILLARY BLOOD GLUCOSE      (05-08 @ 01:33)                      9.6  8.14 )-----------( 118                 28.9    Neutrophils = 6.37 (78.3%)  Lymphocytes = 0.65 (8.0%)  Eosinophils = 0.12 (1.5%)  Basophils = 0.02 (0.2%)  Monocytes = 0.93 (11.4%)  Bands = --%    05-08    137  |  97<L>  |  46<H>  ----------------------------<  308<H>  3.7   |  22  |  3.94<H>    Ca    8.2<L>      08 May 2024 01:33  Phos  2.1     05-08  Mg     2.70     05-08    TPro  6.5  /  Alb  3.2<L>  /  TBili  0.4  /  DBili  x   /  AST  48<H>  /  ALT  37  /  AlkPhos  120  05-08    ( 08 May 2024 01:33 )   PT: 11.2 sec;   INR: 0.99 ratio;             RVP:    Venous Blood Gas:  05-08 @ 01:33  7.45/41/41/28/65.3  VBG Lactate: 1.6        Tox:         Urinalysis Basic - ( 08 May 2024 01:33 )    Color: x / Appearance: x / SG: x / pH: x  Gluc: 308 mg/dL / Ketone: x  / Bili: x / Urobili: x   Blood: x / Protein: x / Nitrite: x   Leuk Esterase: x / RBC: x / WBC x   Sq Epi: x / Non Sq Epi: x / Bacteria: x        WBC Trend: 8.14<--, 12.10<--, 16.47<--    Hb Trend: 9.6<--, 9.6<--, 10.5<--, 10.1<--, 10.3<--        New imaging in last 24 hours:  Consult notes reviewed:

## 2024-05-08 NOTE — PROGRESS NOTE ADULT - SUBJECTIVE AND OBJECTIVE BOX
Patient is a 73y Male     Patient is a 73y old  Male who presents with a chief complaint of Unstable angina, abn EKG (07 May 2024 23:29)      HPI:  73-year-old male past medical history hypertension, ESRD on dialysis Monday Wednesday Friday, diabetes insulin-dependent presents with hiccups patient endorses persistent hiccups for the last 2 days. found to have peaked T waves, a new finding. denies dizziness, N/V, denies CP, palps, abd pain (29 Apr 2024 21:15)      PAST MEDICAL & SURGICAL HISTORY:  Diabetes      Benign essential HTN      HLD (hyperlipidemia)      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      ESRD on hemodialysis      Arteriovenous fistula          MEDICATIONS  (STANDING):  aspirin  chewable 81 milliGRAM(s) Oral daily  chlorhexidine 0.12% Liquid 15 milliLiter(s) Oral Mucosa every 12 hours  chlorhexidine 2% Cloths 1 Application(s) Topical <User Schedule>  dextrose 10% Bolus 125 milliLiter(s) IV Bolus once  dextrose 5%. 1000 milliLiter(s) (50 mL/Hr) IV Continuous <Continuous>  dextrose 5%. 1000 milliLiter(s) (100 mL/Hr) IV Continuous <Continuous>  dextrose 50% Injectable 12.5 Gram(s) IV Push once  dextrose 50% Injectable 25 Gram(s) IV Push once  epoetin reilly (EPOGEN) Injectable 52964 Unit(s) IV Push <User Schedule>  glucagon  Injectable 1 milliGRAM(s) IntraMuscular once  heparin   Injectable 5000 Unit(s) SubCutaneous every 12 hours  insulin lispro (ADMELOG) corrective regimen sliding scale   SubCutaneous every 6 hours  insulin NPH human recombinant 3 Unit(s) SubCutaneous every 6 hours  levETIRAcetam  IVPB 500 milliGRAM(s) IV Intermittent every 12 hours  petrolatum Ophthalmic Ointment 1 Application(s) Both EYES two times a day      Allergies    No Known Allergies    Intolerances        SOCIAL HISTORY:  Denies ETOh,Smoking,     FAMILY HISTORY:  FHx: diabetes mellitus (Father, Aunt)        REVIEW OF SYSTEMS:    CONSTITUTIONAL: No weakness, fevers or chills  EYES/ENT: No visual changes;  No vertigo or throat pain   NECK: No pain or stiffness  RESPIRATORY: No cough, wheezing, hemoptysis; No shortness of breath  CARDIOVASCULAR: No chest pain or palpitations  GASTROINTESTINAL: No abdominal or epigastric pain. No nausea, vomiting, or hematemesis; No diarrhea or constipation. No melena or hematochezia.  GENITOURINARY: No dysuria, frequency or hematuria  NEUROLOGICAL: No numbness or weakness  SKIN: No itching, burning, rashes, or lesions   All other review of systems is negative unless indicated above.    VITAL:  T(C): , Max: 37.7 (05-07-24 @ 20:00)  T(F): , Max: 99.9 (05-07-24 @ 20:00)  HR: 70 (05-08-24 @ 06:00)  BP: 152/68 (05-08-24 @ 06:00)  BP(mean): 92 (05-08-24 @ 06:00)  RR: 14 (05-08-24 @ 06:00)  SpO2: 96% (05-08-24 @ 06:00)  Wt(kg): --    I and O's:    05-05 @ 07:01  -  05-06 @ 07:00  --------------------------------------------------------  IN: 709.6 mL / OUT: 1765 mL / NET: -1055.4 mL    05-06 @ 07:01  -  05-07 @ 07:00  --------------------------------------------------------  IN: 545 mL / OUT: 0 mL / NET: 545 mL    05-07 @ 07:01  -  05-08 @ 06:54  --------------------------------------------------------  IN: 1415 mL / OUT: 1778 mL / NET: -363 mL          PHYSICAL EXAM:    Constitutional: NAD  HEENT: PERRLA,   Neck: No JVD  Respiratory: CTA B/L  Cardiovascular: S1 and S2  Gastrointestinal: BS+, soft, NT/ND  Extremities: No peripheral edema  Neurological: A/O x 3, no focal deficits  Psychiatric: Normal mood, normal affect  : No Abbasi  Skin: No rashes  Access: Not applicable  Back: No CVA tenderness    LABS:                        9.6    8.14  )-----------( 118      ( 08 May 2024 01:33 )             28.9     05-08    137  |  97<L>  |  46<H>  ----------------------------<  308<H>  3.7   |  22  |  3.94<H>    Ca    8.2<L>      08 May 2024 01:33  Phos  2.1     05-08  Mg     2.70     05-08    TPro  6.5  /  Alb  3.2<L>  /  TBili  0.4  /  DBili  x   /  AST  48<H>  /  ALT  37  /  AlkPhos  120  05-08          RADIOLOGY & ADDITIONAL STUDIES:

## 2024-05-08 NOTE — PROGRESS NOTE ADULT - SUBJECTIVE AND OBJECTIVE BOX
[facility]   MD ELIANE BHAGAT MD RUORU WONG, PA    TEL:  FROM 9 AM to 5 PM ---OFFICE: [phone]  AVAILABLE ON TEAMS    FROM 5 PM - 9 AM PLEASE CALL ANSWERING SERVICE: [phone]    RENAL FOLLOW UP NOTE--Date of Service 05-08-24 @ 13:26  --------------------------------------------------------------------------------  HPI:      Pt seen and examined at bedside.       PAST HISTORY  --------------------------------------------------------------------------------  No significant changes to PMH, PSH, FHx, SHx, unless otherwise noted    ALLERGIES & MEDICATIONS  --------------------------------------------------------------------------------  Allergies    No Known Allergies    Intolerances      Standing Inpatient Medications  aspirin  chewable 81 milliGRAM(s) Oral daily  chlorhexidine 0.12% Liquid 15 milliLiter(s) Oral Mucosa every 12 hours  chlorhexidine 2% Cloths 1 Application(s) Topical <User Schedule>  dextrose 10% Bolus 125 milliLiter(s) IV Bolus once  dextrose 5%. 1000 milliLiter(s) IV Continuous <Continuous>  dextrose 5%. 1000 milliLiter(s) IV Continuous <Continuous>  dextrose 50% Injectable 12.5 Gram(s) IV Push once  dextrose 50% Injectable 25 Gram(s) IV Push once  epoetin reilly (EPOGEN) Injectable 95583 Unit(s) IV Push <User Schedule>  glucagon  Injectable 1 milliGRAM(s) IntraMuscular once  heparin   Injectable 5000 Unit(s) SubCutaneous every 12 hours  insulin lispro (ADMELOG) corrective regimen sliding scale   SubCutaneous every 6 hours  insulin NPH human recombinant 6 Unit(s) SubCutaneous every 6 hours  petrolatum Ophthalmic Ointment 1 Application(s) Both EYES two times a day    PRN Inpatient Medications  dextrose Oral Gel 15 Gram(s) Oral once PRN      REVIEW OF SYSTEMS  --------------------------------------------------------------------------------  General: no fever  MSK: no edema     VITALS/PHYSICAL EXAM  --------------------------------------------------------------------------------  T(C): 37.3 (05-08-24 @ 12:00), Max: 37.7 (05-07-24 @ 20:00)  HR: 65 (05-08-24 @ 13:00) (61 - 83)  BP: 123/67 (05-08-24 @ 13:00) (104/59 - 172/76)  RR: 14 (05-08-24 @ 13:00) (10 - 19)  SpO2: 97% (05-08-24 @ 13:00) (79% - 100%)  Wt(kg): --        05-07-24 @ 07:01  -  05-08-24 @ 07:00  --------------------------------------------------------  IN: 1950 mL / OUT: 1778 mL / NET: 172 mL    05-08-24 @ 07:01  -  05-08-24 @ 13:26  --------------------------------------------------------  IN: 90 mL / OUT: 0 mL / NET: 90 mL      Physical Exam:  	Gen: NAD  	HEENT: ett  	Pulm: CTA B/L  	CV: S1S2  	Abd: Soft, +BS  	Ext: No LE edema B/L                      Neuro: sedated  	Skin: Warm and Dry   	Vascular access:  HD catheter           LABS/STUDIES  --------------------------------------------------------------------------------              9.6    8.14  >-----------<  118      [05-08-24 @ 01:33]              28.9     137  |  97  |  46  ----------------------------<  308      [05-08-24 @ 01:33]  3.7   |  22  |  3.94        Ca     8.2     [05-08-24 @ 01:33]      Mg     2.70     [05-08-24 @ 01:33]      Phos  2.1     [05-08-24 @ 01:33]    TPro  6.5  /  Alb  3.2  /  TBili  0.4  /  DBili  x   /  AST  48  /  ALT  37  /  AlkPhos  120  [05-08-24 @ 01:33]    PT/INR: PT 11.2 , INR 0.99       [05-08-24 @ 01:33]      Creatinine Trend:  SCr 3.94 [05-08 @ 01:33]  SCr 3.77 [05-07 @ 00:30]  SCr 1.83 [05-06 @ 00:00]  SCr 2.06 [05-05 @ 16:30]  SCr 2.39 [05-05 @ 10:15]    Urinalysis - [05-08-24 @ 01:33]      Color  / Appearance  / SG  / pH       Gluc 308 / Ketone   / Bili  / Urobili        Blood  / Protein  / Leuk Est  / Nitrite       RBC  / WBC  / Hyaline  / Gran  / Sq Epi  / Non Sq Epi  / Bacteria       Iron 47, TIBC --, %sat --      [05-01-24 @ 06:44]  TSH 2.79      [04-30-24 @ 06:03]    HBsAb <3.0      [05-01-24 @ 14:42]  HBcAb Nonreact      [05-01-24 @ 14:42]

## 2024-05-08 NOTE — PROGRESS NOTE ADULT - ASSESSMENT
73-year-old male past medical history hypertension, ESRD on dialysis Monday Wednesday Friday, diabetes insulin-dependent presents with hiccups patient endorses persistent hiccups for the last 2 days. Nephrology consulted for HD needs.    A/P  ESRD:  Center: [facility]  Nephrologist: Dr. Hardwick  Access: R AVF  MWF schedule  Vascular for fistulogram   s/p femoral shiley on 5/1, now removed  s/p placement of IJ shiley 5/3  Stopped CRRT   Restarted IHD  s/p HD 5/7 UF 1778; treatment terminated early due to hypotension   HD tomorrow  Consent obtained and placed in ED chart  Renal diet  Monitor BMP    Hyperkalemia  HD as above  Monitor closely     HTN:  Marginal   Off pressors.  Care per ICU.  Monitor BP    Anemia:  SILVA w/ HD  Monitor Hb    CKD-MBD  Check PTH  PO4 acceptable  -  if high consider calcium acetate 667mg TID.  Low PO4 diet.  Monitor Ca, PO4 daily

## 2024-05-08 NOTE — PROGRESS NOTE ADULT - ASSESSMENT
73-year-old male past medical history hypertension, ESRD on dialysis Monday Wednesday Friday, diabetes insulin-dependent presents with hiccups patient endorses persistent hiccups for the last 2 days.   found to have peaked T waves, a new finding. denied dizziness, N/V, denies CP, palps, abd pain  Upon admission seen by CArd, renal and GI  found to have a distended GB prob 2/2 gastroparesis, was started on regaln  has received 4 doses of baclofen since 4/30 2/2 hiccups, last dose on 5/1 at 5 am  had received Haldol for agitation at 11 pm on 4/30, AMS observed in pm of 5/1  AMS ongoing, RRT x 2 called  now transferred to MICU for encephalopathy requiring  airway protection on 5/2,  intubated for airway protection, was on  propofol and pressors. now off   HD was not done timely until femoral shiley was placed 2/2 clotted RUE AVF. now removed and RIJ shiley placed on 5/3  has been nonverbal since pm 5/1.     MR brain 5/6 c/w L pontine and L cerebellar acute infarcts, no hemorrhage . as per neuro: embolic in nature.   encephalopathy probably 2/2 acute CVA, now follows commands appropriately off sedation  no SZ focus on EEG,   off CRRT,  HD resumed as per renal.   remains intubated,   off keppra  off AC, off pressors, off CRRT, on HD as per renal,   completed 5 days of empiric ZOSYN on 5/7  toxicology consulted upon dx of encephalopathy, not impressed AMS 2/2 Haldol nor baclofen . AMS was 2/2 acute CVA   afebrile, off pressors, shock resolved  remains intubated for airway protection,   leukocytosis resolved  EKG changes on 5/3 c/w NSTEMI loaded w DAPT , was  on Heparin drip x 48 hrs. rpt TTE is unchanged  ID, Neuro , renal, cardiology following.  esrd, had missed HD prior to AMS 2/2 clotted RUE AVF. fistulogram when medically stable. vascular following   HD via RIght IJ Shiley.   NGT in place in stomach.   LP done, no sign of meningitis.         NEUROLOGY     - CTH without acute findings   - LP done, no acute findings  - MR brain w acute infarcts: L talya and L cerebellum, nonhemorrhagic  - no Sz focus on EEG    CARDIOVASCULAR     - ptn never had CP. just peaked T waves. was awaiting ischemic study w nucl stress test  - TTE showing EF 72%, rpt unchanged  -EKG changes on 5/3, loaded w DAPT now on Heparin drip      GI  - on NGT feeds while sedated, intubated  - Gastroparesis       RENAL   - s/p CRRT in MICU, now off, HD as per renal  - via R IJ kolby. R fem removed  - pending fistula study of RUE when medically optimized      INFECTIOUS DISEASE   - sepsis resolved  - pan scan  of C/A/P on admission was neg  -  blood cx NTD  - s/p 5 days pf empiric  ZOsyn    ENDOCRINE   - DM  - cw ISS    GOC:  Full code

## 2024-05-08 NOTE — PROGRESS NOTE ADULT - ASSESSMENT
72 y/o M PMhx HTN, ESRD (on HD M/W/F), DM who presented with hiccups x 2 days and chest pain found to have peaked T waves. He was evaluated by cardiology and was planned for nuclear stress test. He was started on reglan for possible gastroparesis. Hospital course c/b AMS s/p RRT on 5/1 and 5/2. Vascular consulted 2/2 RUE AVF access unable to be used s/p R femoral shiley placed for emergent HD. Transferred to MICU and intubated 5/2 for airway protection.     AMS, CVA  CXR- clear, no focal consolidation  TTE- no evidence of valvular disease  s/p LP 5/2, cell count not consistent w/ bacterial meningitis, CSF PCR- negative  s/p empiric zosyn x 5 days, completed 5/7  MRI- Punctate foci of restricted diffusion in the left talya and left cerebellum with associated T2 prolongation consistent with acute infarcts    leukocytosis- resolved    ESRD  tentative plan for RUE fistulogram when medically optimized    Recommendations  monitor off antibiotics  f/u pending CSF studies  f/u neurology recs    Steven Torres M.D.  [facility], Division of Infectious Diseases  [phone]  After 5pm on weekdays and all day on weekends - please call [phone]

## 2024-05-09 LAB
ALBUMIN SERPL ELPH-MCNC: 2.9 G/DL — LOW (ref 3.3–5)
ALP SERPL-CCNC: 104 U/L — SIGNIFICANT CHANGE UP (ref 40–120)
ALT FLD-CCNC: 35 U/L — SIGNIFICANT CHANGE UP (ref 4–41)
ANION GAP SERPL CALC-SCNC: 20 MMOL/L — HIGH (ref 7–14)
APTT BLD: 27.7 SEC — SIGNIFICANT CHANGE UP (ref 24.5–35.6)
AST SERPL-CCNC: 33 U/L — SIGNIFICANT CHANGE UP (ref 4–40)
BASOPHILS # BLD AUTO: 0.03 K/UL — SIGNIFICANT CHANGE UP (ref 0–0.2)
BASOPHILS NFR BLD AUTO: 0.3 % — SIGNIFICANT CHANGE UP (ref 0–2)
BILIRUB SERPL-MCNC: 0.3 MG/DL — SIGNIFICANT CHANGE UP (ref 0.2–1.2)
BLD GP AB SCN SERPL QL: NEGATIVE — SIGNIFICANT CHANGE UP
BLOOD GAS VENOUS COMPREHENSIVE RESULT: SIGNIFICANT CHANGE UP
BUN SERPL-MCNC: 68 MG/DL — HIGH (ref 7–23)
CALCIUM SERPL-MCNC: 8.1 MG/DL — LOW (ref 8.4–10.5)
CHLORIDE SERPL-SCNC: 101 MMOL/L — SIGNIFICANT CHANGE UP (ref 98–107)
CO2 SERPL-SCNC: 21 MMOL/L — LOW (ref 22–31)
CREAT SERPL-MCNC: 5.81 MG/DL — HIGH (ref 0.5–1.3)
EGFR: 10 ML/MIN/1.73M2 — LOW
EOSINOPHIL # BLD AUTO: 0.37 K/UL — SIGNIFICANT CHANGE UP (ref 0–0.5)
EOSINOPHIL NFR BLD AUTO: 3.9 % — SIGNIFICANT CHANGE UP (ref 0–6)
GLUCOSE BLDC GLUCOMTR-MCNC: 125 MG/DL — HIGH (ref 70–99)
GLUCOSE BLDC GLUCOMTR-MCNC: 135 MG/DL — HIGH (ref 70–99)
GLUCOSE BLDC GLUCOMTR-MCNC: 174 MG/DL — HIGH (ref 70–99)
GLUCOSE BLDC GLUCOMTR-MCNC: 256 MG/DL — HIGH (ref 70–99)
GLUCOSE SERPL-MCNC: 215 MG/DL — HIGH (ref 70–99)
HCT VFR BLD CALC: 28.2 % — LOW (ref 39–50)
HGB BLD-MCNC: 9.2 G/DL — LOW (ref 13–17)
IANC: 7.15 K/UL — SIGNIFICANT CHANGE UP (ref 1.8–7.4)
IMM GRANULOCYTES NFR BLD AUTO: 1.2 % — HIGH (ref 0–0.9)
INR BLD: 1.03 RATIO — SIGNIFICANT CHANGE UP (ref 0.85–1.18)
LYMPHOCYTES # BLD AUTO: 0.85 K/UL — LOW (ref 1–3.3)
LYMPHOCYTES # BLD AUTO: 9 % — LOW (ref 13–44)
MAGNESIUM SERPL-MCNC: 2.9 MG/DL — HIGH (ref 1.6–2.6)
MCHC RBC-ENTMCNC: 20.4 PG — LOW (ref 27–34)
MCHC RBC-ENTMCNC: 32.6 GM/DL — SIGNIFICANT CHANGE UP (ref 32–36)
MCV RBC AUTO: 62.7 FL — LOW (ref 80–100)
MONOCYTES # BLD AUTO: 0.92 K/UL — HIGH (ref 0–0.9)
MONOCYTES NFR BLD AUTO: 9.8 % — SIGNIFICANT CHANGE UP (ref 2–14)
NEUTROPHILS # BLD AUTO: 7.15 K/UL — SIGNIFICANT CHANGE UP (ref 1.8–7.4)
NEUTROPHILS NFR BLD AUTO: 75.8 % — SIGNIFICANT CHANGE UP (ref 43–77)
NRBC # BLD: 0 /100 WBCS — SIGNIFICANT CHANGE UP (ref 0–0)
NRBC # FLD: 0.02 K/UL — HIGH (ref 0–0)
PHOSPHATE SERPL-MCNC: 5.7 MG/DL — HIGH (ref 2.5–4.5)
PLATELET # BLD AUTO: 155 K/UL — SIGNIFICANT CHANGE UP (ref 150–400)
POTASSIUM SERPL-MCNC: 3.9 MMOL/L — SIGNIFICANT CHANGE UP (ref 3.5–5.3)
POTASSIUM SERPL-SCNC: 3.9 MMOL/L — SIGNIFICANT CHANGE UP (ref 3.5–5.3)
PROT SERPL-MCNC: 6.3 G/DL — SIGNIFICANT CHANGE UP (ref 6–8.3)
PROTHROM AB SERPL-ACNC: 11.6 SEC — SIGNIFICANT CHANGE UP (ref 9.5–13)
RBC # BLD: 4.5 M/UL — SIGNIFICANT CHANGE UP (ref 4.2–5.8)
RBC # FLD: 20.4 % — HIGH (ref 10.3–14.5)
RH IG SCN BLD-IMP: POSITIVE — SIGNIFICANT CHANGE UP
SODIUM SERPL-SCNC: 142 MMOL/L — SIGNIFICANT CHANGE UP (ref 135–145)
TROPONIN T, HIGH SENSITIVITY RESULT: 70 NG/L — CRITICAL HIGH
WBC # BLD: 9.43 K/UL — SIGNIFICANT CHANGE UP (ref 3.8–10.5)
WBC # FLD AUTO: 9.43 K/UL — SIGNIFICANT CHANGE UP (ref 3.8–10.5)

## 2024-05-09 PROCEDURE — 70544 MR ANGIOGRAPHY HEAD W/O DYE: CPT | Mod: 26

## 2024-05-09 PROCEDURE — 70549 MR ANGIOGRAPH NECK W/O&W/DYE: CPT | Mod: 26

## 2024-05-09 PROCEDURE — 99291 CRITICAL CARE FIRST HOUR: CPT | Mod: GC

## 2024-05-09 RX ORDER — ALTEPLASE 100 MG
2 KIT INTRAVENOUS ONCE
Refills: 0 | Status: COMPLETED | OUTPATIENT
Start: 2024-05-09 | End: 2024-05-09

## 2024-05-09 RX ORDER — HUMAN INSULIN 100 [IU]/ML
9 INJECTION, SUSPENSION SUBCUTANEOUS EVERY 6 HOURS
Refills: 0 | Status: DISCONTINUED | OUTPATIENT
Start: 2024-05-09 | End: 2024-05-10

## 2024-05-09 RX ORDER — POTASSIUM PHOSPHATE, MONOBASIC POTASSIUM PHOSPHATE, DIBASIC 236; 224 MG/ML; MG/ML
30 INJECTION, SOLUTION INTRAVENOUS ONCE
Refills: 0 | Status: DISCONTINUED | OUTPATIENT
Start: 2024-05-09 | End: 2024-05-09

## 2024-05-09 RX ADMIN — Medication 1 APPLICATION(S): at 05:34

## 2024-05-09 RX ADMIN — CHLORHEXIDINE GLUCONATE 15 MILLILITER(S): 213 SOLUTION TOPICAL at 05:34

## 2024-05-09 RX ADMIN — Medication 0: at 17:45

## 2024-05-09 RX ADMIN — Medication 81 MILLIGRAM(S): at 12:57

## 2024-05-09 RX ADMIN — HUMAN INSULIN 6 UNIT(S): 100 INJECTION, SUSPENSION SUBCUTANEOUS at 05:32

## 2024-05-09 RX ADMIN — Medication 2: at 12:57

## 2024-05-09 RX ADMIN — Medication 6: at 05:33

## 2024-05-09 RX ADMIN — ALTEPLASE 2 MILLIGRAM(S): KIT at 18:00

## 2024-05-09 RX ADMIN — CHLORHEXIDINE GLUCONATE 15 MILLILITER(S): 213 SOLUTION TOPICAL at 17:45

## 2024-05-09 RX ADMIN — CHLORHEXIDINE GLUCONATE 1 APPLICATION(S): 213 SOLUTION TOPICAL at 06:04

## 2024-05-09 RX ADMIN — HEPARIN SODIUM 5000 UNIT(S): 5000 INJECTION INTRAVENOUS; SUBCUTANEOUS at 17:46

## 2024-05-09 RX ADMIN — HEPARIN SODIUM 5000 UNIT(S): 5000 INJECTION INTRAVENOUS; SUBCUTANEOUS at 05:34

## 2024-05-09 RX ADMIN — Medication 1 APPLICATION(S): at 17:45

## 2024-05-09 NOTE — PROGRESS NOTE ADULT - SUBJECTIVE AND OBJECTIVE BOX
Patient is a 73y old  Male who presents with a chief complaint of Unstable angina, abn EKG (09 May 2024 15:54)      SUBJECTIVE / OVERNIGHT EVENTS: off pressors, on HD, remains in MICU, intubated    MEDICATIONS  (STANDING):  aspirin  chewable 81 milliGRAM(s) Oral daily  chlorhexidine 0.12% Liquid 15 milliLiter(s) Oral Mucosa every 12 hours  chlorhexidine 2% Cloths 1 Application(s) Topical <User Schedule>  dextrose 10% Bolus 125 milliLiter(s) IV Bolus once  dextrose 5%. 1000 milliLiter(s) (50 mL/Hr) IV Continuous <Continuous>  dextrose 5%. 1000 milliLiter(s) (100 mL/Hr) IV Continuous <Continuous>  dextrose 50% Injectable 12.5 Gram(s) IV Push once  dextrose 50% Injectable 25 Gram(s) IV Push once  epoetin reilly (EPOGEN) Injectable 06039 Unit(s) IV Push <User Schedule>  glucagon  Injectable 1 milliGRAM(s) IntraMuscular once  heparin   Injectable 5000 Unit(s) SubCutaneous every 12 hours  insulin lispro (ADMELOG) corrective regimen sliding scale   SubCutaneous every 6 hours  insulin NPH human recombinant 9 Unit(s) SubCutaneous every 6 hours  petrolatum Ophthalmic Ointment 1 Application(s) Both EYES two times a day    MEDICATIONS  (PRN):  dextrose Oral Gel 15 Gram(s) Oral once PRN Blood Glucose LESS THAN 70 milliGRAM(s)/deciliter      Vital Signs Last 24 Hrs  T(F): 97 (05-09-24 @ 22:10), Max: 99.6 (05-09-24 @ 00:00)  HR: 87 (05-09-24 @ 22:58) (57 - 87)  BP: 138/82 (05-09-24 @ 22:10) (112/76 - 165/80)  RR: 19 (05-09-24 @ 22:10) (10 - 22)  SpO2: 94% (05-09-24 @ 22:58) (94% - 100%)  Telemetry:   CAPILLARY BLOOD GLUCOSE      POCT Blood Glucose.: 135 mg/dL (09 May 2024 17:35)  POCT Blood Glucose.: 174 mg/dL (09 May 2024 12:20)  POCT Blood Glucose.: 256 mg/dL (09 May 2024 05:25)  POCT Blood Glucose.: 196 mg/dL (08 May 2024 23:18)    I&O's Summary    08 May 2024 07:01  -  09 May 2024 07:00  --------------------------------------------------------  IN: 1175 mL / OUT: 0 mL / NET: 1175 mL    09 May 2024 07:01  -  09 May 2024 23:00  --------------------------------------------------------  IN: 600 mL / OUT: 2173 mL / NET: -1573 mL        PHYSICAL EXAM:  GENERAL: NAD, well-developed  HEAD:  Atraumatic, Normocephalic  EYES: EOMI, PERRLA, conjunctiva and sclera clear  NECK: Supple, No JVD  CHEST/LUNG: Clear to auscultation bilaterally; No wheeze  HEART: Regular rate and rhythm; No murmurs, rubs, or gallops  ABDOMEN: Soft, Nontender, Nondistended; Bowel sounds present  EXTREMITIES:  2+ Peripheral Pulses, No clubbing, cyanosis, or edema  PSYCH: AAOx3  NEUROLOGY: non-focal  SKIN: No rashes or lesions    LABS:                        9.2    9.43  )-----------( 155      ( 09 May 2024 00:50 )             28.2     05-09    142  |  101  |  68<H>  ----------------------------<  215<H>  3.9   |  21<L>  |  5.81<H>    Ca    8.1<L>      09 May 2024 00:50  Phos  5.7     05-09  Mg     2.90     05-09    TPro  6.3  /  Alb  2.9<L>  /  TBili  0.3  /  DBili  x   /  AST  33  /  ALT  35  /  AlkPhos  104  05-09    PT/INR - ( 09 May 2024 00:50 )   PT: 11.6 sec;   INR: 1.03 ratio         PTT - ( 09 May 2024 00:50 )  PTT:27.7 sec      Urinalysis Basic - ( 09 May 2024 00:50 )    Color: x / Appearance: x / SG: x / pH: x  Gluc: 215 mg/dL / Ketone: x  / Bili: x / Urobili: x   Blood: x / Protein: x / Nitrite: x   Leuk Esterase: x / RBC: x / WBC x   Sq Epi: x / Non Sq Epi: x / Bacteria: x        RADIOLOGY & ADDITIONAL TESTS:    Imaging Personally Reviewed:    Consultant(s) Notes Reviewed:      Care Discussed with Consultants/Other Providers:

## 2024-05-09 NOTE — PROGRESS NOTE ADULT - SUBJECTIVE AND OBJECTIVE BOX
Patient is a 73y Male     Patient is a 73y old  Male who presents with a chief complaint of Unstable angina, abn EKG (09 May 2024 06:53)      HPI:  73-year-old male past medical history hypertension, ESRD on dialysis Monday Wednesday Friday, diabetes insulin-dependent presents with hiccups patient endorses persistent hiccups for the last 2 days. found to have peaked T waves, a new finding. denies dizziness, N/V, denies CP, palps, abd pain (29 Apr 2024 21:15)      PAST MEDICAL & SURGICAL HISTORY:  Diabetes      Benign essential HTN      HLD (hyperlipidemia)      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      ESRD on hemodialysis      Arteriovenous fistula          MEDICATIONS  (STANDING):  aspirin  chewable 81 milliGRAM(s) Oral daily  chlorhexidine 0.12% Liquid 15 milliLiter(s) Oral Mucosa every 12 hours  chlorhexidine 2% Cloths 1 Application(s) Topical <User Schedule>  dextrose 10% Bolus 125 milliLiter(s) IV Bolus once  dextrose 5%. 1000 milliLiter(s) (50 mL/Hr) IV Continuous <Continuous>  dextrose 5%. 1000 milliLiter(s) (100 mL/Hr) IV Continuous <Continuous>  dextrose 50% Injectable 12.5 Gram(s) IV Push once  dextrose 50% Injectable 25 Gram(s) IV Push once  epoetin reilly (EPOGEN) Injectable 18934 Unit(s) IV Push <User Schedule>  glucagon  Injectable 1 milliGRAM(s) IntraMuscular once  heparin   Injectable 5000 Unit(s) SubCutaneous every 12 hours  insulin lispro (ADMELOG) corrective regimen sliding scale   SubCutaneous every 6 hours  insulin NPH human recombinant 6 Unit(s) SubCutaneous every 6 hours  petrolatum Ophthalmic Ointment 1 Application(s) Both EYES two times a day      Allergies    No Known Allergies    Intolerances        SOCIAL HISTORY:  Denies ETOh,Smoking,     FAMILY HISTORY:  FHx: diabetes mellitus (Father, Aunt)        REVIEW OF SYSTEMS:    CONSTITUTIONAL: No weakness, fevers or chills  EYES/ENT: No visual changes;  No vertigo or throat pain   NECK: No pain or stiffness  RESPIRATORY: No cough, wheezing, hemoptysis; No shortness of breath  CARDIOVASCULAR: No chest pain or palpitations  GASTROINTESTINAL: No abdominal or epigastric pain. No nausea, vomiting, or hematemesis; No diarrhea or constipation. No melena or hematochezia.  GENITOURINARY: No dysuria, frequency or hematuria  NEUROLOGICAL: No numbness or weakness  SKIN: No itching, burning, rashes, or lesions   All other review of systems is negative unless indicated above.    VITAL:  T(C): , Max: 37.8 (05-08-24 @ 20:00)  T(F): , Max: 100.1 (05-08-24 @ 20:00)  HR: 67 (05-09-24 @ 06:44)  BP: 137/66 (05-09-24 @ 06:00)  BP(mean): 88 (05-09-24 @ 06:00)  RR: 14 (05-09-24 @ 06:00)  SpO2: 97% (05-09-24 @ 06:44)  Wt(kg): --    I and O's:    05-07 @ 07:01  -  05-08 @ 07:00  --------------------------------------------------------  IN: 1950 mL / OUT: 1778 mL / NET: 172 mL    05-08 @ 07:01  -  05-09 @ 07:00  --------------------------------------------------------  IN: 1175 mL / OUT: 0 mL / NET: 1175 mL          PHYSICAL EXAM:    Constitutional: NAD  HEENT: PERRLA,   Neck: No JVD  Respiratory: CTA B/L  Cardiovascular: S1 and S2  Gastrointestinal: BS+, soft, NT/ND  Extremities: No peripheral edema  Neurological: A/O x 3, no focal deficits  Psychiatric: Normal mood, normal affect  : No Abbasi  Skin: No rashes  Access: Not applicable  Back: No CVA tenderness    LABS:                        9.2    9.43  )-----------( 155      ( 09 May 2024 00:50 )             28.2     05-09    142  |  101  |  68<H>  ----------------------------<  215<H>  3.9   |  21<L>  |  5.81<H>    Ca    8.1<L>      09 May 2024 00:50  Phos  5.7     05-09  Mg     2.90     05-09    TPro  6.3  /  Alb  2.9<L>  /  TBili  0.3  /  DBili  x   /  AST  33  /  ALT  35  /  AlkPhos  104  05-09          RADIOLOGY & ADDITIONAL STUDIES:

## 2024-05-09 NOTE — CHART NOTE - NSCHARTNOTEFT_GEN_A_CORE
NUTRITION FOLLOW-UP        72 y/o M w Hx of ESRD on HD, HLD, HTN, IDDM presenting with hiccups and chest pain. Found to have distended gallbladder 2/2 gastroparesis, started on Reglan.  S/p RRT for AMS & transferred to MICU for encephalopathy requiring intubation for airway protection. Pt. with unstable angina, acute hypoxic respiratory failure, aspiration PNA, acute ischemic stroke and on HD.  Pt. may be extubated today to BiPAP if does well on CPAP trials.    Pt. remains intubated @ this time.  Unable to speak to RD.  About to be taken off unit for MRI.    Ordered for enteral feedings with Nepro.  No nausea/vomiting/diarrhea/constipation or abdominal distention.  Last bowel movement (5/8).    Per chart review, Pt. had reached goal of 45mL/hr yesterday (5/8)... however previously had been below recommended/prescribed goal. Hence, overall suboptimal energy intake.  Weight variations likely somewhat fluid related considering dialysis.      Diet order requires clarification as 18hrs (not 24hr duration).   18hr tube feeding provides:   810mL total volume  1458 KCals  66g protein (+ 45g additional protein via LPS)= 111g total protein.          _________________Diet___________________  Diet, NPO with Tube Feed:   Tube Feeding Modality: Orogastric  Nepro with Carb Steady (NEPRORTH)  Total Volume for 24 Hours (mL): 1080  Continuous  Starting Tube Feed Rate {mL per Hour}: 10  Increase Tube Feed Rate by (mL): 35     Every hour  Until Goal Tube Feed Rate (mL per Hour): 45  Tube Feed Duration (in Hours): 24  Tube Feed Start Time: 11:00  Tube Feed Stop Time: 05:00  Liquid Protein Supplement     Qty per Day:  3 (05-06-24 @ 18:19) [Active]          Weight:                    Height:  68" / 172.7cm          Ideal Body Weight: 154lbs /70kg  50.0kg (5/9)  52.2kg (5/5)  52.6kg (4/29 on admit)        _____Estimated Energy Needs (based on IBW)_____  20-25 KCals/kg = 5764-8783 KCals/d  1.4-1.6g protein/kg = 98-112g protein/d      Edema: None.  Skin: No pressure injuries      ________________________Pertinent Medications____________   MEDICATIONS  (STANDING):  aspirin  chewable 81 milliGRAM(s) Oral daily  chlorhexidine 0.12% Liquid 15 milliLiter(s) Oral Mucosa every 12 hours  chlorhexidine 2% Cloths 1 Application(s) Topical <User Schedule>  dextrose 10% Bolus 125 milliLiter(s) IV Bolus once  dextrose 5%. 1000 milliLiter(s) (50 mL/Hr) IV Continuous <Continuous>  dextrose 5%. 1000 milliLiter(s) (100 mL/Hr) IV Continuous <Continuous>  dextrose 50% Injectable 12.5 Gram(s) IV Push once  dextrose 50% Injectable 25 Gram(s) IV Push once  epoetin reilly (EPOGEN) Injectable 76023 Unit(s) IV Push <User Schedule>  glucagon  Injectable 1 milliGRAM(s) IntraMuscular once  heparin   Injectable 5000 Unit(s) SubCutaneous every 12 hours  insulin lispro (ADMELOG) corrective regimen sliding scale   SubCutaneous every 6 hours  insulin NPH human recombinant 9 Unit(s) SubCutaneous every 6 hours  petrolatum Ophthalmic Ointment 1 Application(s) Both EYES two times a day    MEDICATIONS  (PRN):  dextrose Oral Gel 15 Gram(s) Oral once PRN Blood Glucose LESS THAN 70 milliGRAM(s)/deciliter          _________________________Pertinent Labs____________________     05-09    142  |  101  |  68<H>  ----------------------------<  215<H>  3.9   |  21<L>  |  5.81<H>    Ca    8.1<L>      09 May 2024 00:50  Phos  5.7     05-09  Mg     2.90     05-09    TPro  6.3  /  Alb  2.9<L>  /  TBili  0.3  /  DBili  x   /  AST  33  /  ALT  35  /  AlkPhos  104  05-09                                                                   9.2    9.43  )-----------( 155      ( 09 May 2024 00:50 )             28.2         CAPILLARY BLOOD GLUCOSE    POCT Blood Glucose.: 174 mg/dL (05-09-24 @ 12:20)  POCT Blood Glucose.: 256 mg/dL (05-09-24 @ 05:25)  POCT Blood Glucose.: 196 mg/dL (05-08-24 @ 23:18)  POCT Blood Glucose.: 257 mg/dL (05-08-24 @ 17:20)      ________NUTRITION Dx_________    Pt. remains severely malnourished         PLAN/RECOMMENDATIONS:    1) Nepro @ 45mL/hr x 18hrs (11a-5a) + Liquid Protein Supplement (LPS) 3 packets per day  2) Obtain daily & pre/post HD weights  3) Monitor tolerance to TF, GI status, electrolytes, glucose     RDN remains available and will f/u PRN.          Janet Peñaloza RDN, CDN       pager [phone] or MS Teams

## 2024-05-09 NOTE — PROGRESS NOTE ADULT - ASSESSMENT
73M w/ PMHx hypertension, ESRD on HD via R radiocephalic fistula (MWF), DM, HTN, p/w hiccups and peaked T waves, admitted to MICU for c/f baclofen toxicity. Patient received 1x HD on admission. R femoral shiley removed 5/2, had 2 RRT for AMS and failed HD via AVF 5/3. S/p emergent R IJ shiley placement 5/3, started CRRT which is now transitioned back to iHD. Vascular planning RUE fistulogram when medically optimized.     Recommendations:   - Patient is off pressors and CRRT but remains intubated  - Getting HD via R IJ shiley today  - Planning for RUE fistulogram when medically optimized/when patient is downgraded from ICU  - Global care per primary     Vascular Surgery  [id number]

## 2024-05-09 NOTE — PROGRESS NOTE ADULT - ASSESSMENT
72 y/o M PMhx HTN, ESRD (on HD M/W/F), DM who presented with hiccups x 2 days and chest pain found to have peaked T waves. He was evaluated by cardiology and was planned for nuclear stress test. He was started on reglan for possible gastroparesis. Hospital course c/b AMS s/p RRT on 5/1 and 5/2. Vascular consulted 2/2 RUE AVF access unable to be used s/p R femoral shiley placed for emergent HD. Transferred to MICU and intubated 5/2 for airway protection.     AMS, CVA  TTE- no evidence of valvular disease  s/p LP 5/2, cell count not consistent w/ bacterial meningitis, CSF PCR- negative  MRI- Punctate foci of restricted diffusion in the left talya and left cerebellum with associated T2 prolongation consistent with acute infarcts    leukocytosis- resolved  s/p empiric zosyn x 5 days, completed 5/7    ESRD  tentative plan for RUE fistulogram when medically optimized    Recommendations  monitor off antibiotics  f/u pending CSF studies  f/u neurology recs    We will sign off. Thank you for allowing us to participate in the care of Mr. Art. Please feel free to call with any questions or concerns.     Steven Torres M.D.  [facility], Division of Infectious Diseases  [phone]  After 5pm on weekdays and all day on weekends - please call [phone]

## 2024-05-09 NOTE — PROGRESS NOTE ADULT - SUBJECTIVE AND OBJECTIVE BOX
Progress Note    JIMMY Glasgow MD 73y (1950) Male [id number]  04-29-24 (10d)      Chief Complaint: Unstable angina, abn EKG    Subjective:  No acute events overnight. Patient seen and examined at bedside.    Review of Systems:  CONSTITUTIONAL: No fever, weight loss, or fatigue  EYES: No eye pain, visual disturbances, or discharge  ENMT:  No difficulty hearing, tinnitus, vertigo; No sinus or throat pain  NECK: No pain or stiffness  RESPIRATORY: No cough, wheezing, chills or hemoptysis; No shortness of breath  CARDIOVASCULAR: No chest pain, palpitations, dizziness, or leg swelling  GASTROINTESTINAL: No abdominal or epigastric pain. No nausea, vomiting, or hematemesis; No diarrhea or constipation. No melena or hematochezia.  GENITOURINARY: No dysuria, frequency, hematuria, or incontinence  NEUROLOGICAL: No headaches, memory loss, loss of strength, numbness, or tremors  SKIN: No itching, burning, rashes, or lesions   MUSCULOSKELETAL: No joint pain or swelling; No muscle, back, or extremity pain      PAST MEDICAL & SURGICAL HISTORY:  Diabetes [250.00]    Stage 5 chronic kidney disease not on chronic dialysis [N18.5]    Benign essential HTN [I10]    HLD (hyperlipidemia) [E78.5]    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 [N18.6]    ESRD on hemodialysis [N18.6]    No significant past surgical history [090039234]    AVF (arteriovenous fistula) [I77.0]    Refined to: Arteriovenous fistula    Arteriovenous fistula [I77.0]      aspirin  chewable 81 milliGRAM(s) Oral daily  chlorhexidine 0.12% Liquid 15 milliLiter(s) Oral Mucosa every 12 hours  chlorhexidine 2% Cloths 1 Application(s) Topical <User Schedule>  dextrose 10% Bolus 125 milliLiter(s) IV Bolus once  dextrose 5%. 1000 milliLiter(s) IV Continuous <Continuous>  dextrose 5%. 1000 milliLiter(s) IV Continuous <Continuous>  dextrose 50% Injectable 12.5 Gram(s) IV Push once  dextrose 50% Injectable 25 Gram(s) IV Push once  dextrose Oral Gel 15 Gram(s) Oral once PRN  epoetin reilly (EPOGEN) Injectable 52887 Unit(s) IV Push <User Schedule>  glucagon  Injectable 1 milliGRAM(s) IntraMuscular once  heparin   Injectable 5000 Unit(s) SubCutaneous every 12 hours  insulin lispro (ADMELOG) corrective regimen sliding scale   SubCutaneous every 6 hours  insulin NPH human recombinant 6 Unit(s) SubCutaneous every 6 hours  petrolatum Ophthalmic Ointment 1 Application(s) Both EYES two times a day    Objective:  T(C): 37.6 (05-09-24 @ 04:00), Max: 37.8 (05-08-24 @ 20:00)  HR: 67 (05-09-24 @ 06:44) (61 - 79)  BP: 137/66 (05-09-24 @ 06:00) (104/86 - 161/78)  RR: 14 (05-09-24 @ 06:00) (10 - 15)  SpO2: 97% (05-09-24 @ 06:44) (95% - 100%)    Physical exam:  GENERAL: NAD, well-developed  HEAD:  Atraumatic, Normocephalic  EYES: EOMI, PERRLA, conjunctiva and sclera clear  NECK: Supple, No JVD  CHEST/LUNG: Clear to auscultation bilaterally; No wheeze  HEART: Regular rate and rhythm; No murmurs, rubs, or gallops  ABDOMEN: Soft, Nontender, Nondistended; Bowel sounds present  EXTREMITIES:  2+ Peripheral Pulses, No clubbing, cyanosis, or edema  PSYCH: AAOx3  NEUROLOGY: non-focal  SKIN: No rashes or lesions      05-07-24 @ 07:01  -  05-08-24 @ 07:00  --------------------------------------------------------  IN: 1950 mL / OUT: 1778 mL / NET: 172 mL    05-08-24 @ 07:01  -  05-09-24 @ 06:54  --------------------------------------------------------  IN: 1175 mL / OUT: 0 mL / NET: 1175 mL        CAPILLARY BLOOD GLUCOSE      (05-09 @ 00:50)                      9.2  9.43 )-----------( 155                 28.2    Neutrophils = 7.15 (75.8%)  Lymphocytes = 0.85 (9.0%)  Eosinophils = 0.37 (3.9%)  Basophils = 0.03 (0.3%)  Monocytes = 0.92 (9.8%)  Bands = --%    05-09    142  |  101  |  68<H>  ----------------------------<  215<H>  3.9   |  21<L>  |  5.81<H>    Ca    8.1<L>      09 May 2024 00:50  Phos  5.7     05-09  Mg     2.90     05-09    TPro  6.3  /  Alb  2.9<L>  /  TBili  0.3  /  DBili  x   /  AST  33  /  ALT  35  /  AlkPhos  104  05-09    ( 09 May 2024 00:50 )   PT: 11.6 sec;   INR: 1.03 ratio;       PTT:27.7 sec      RVP:    Venous Blood Gas:  05-09 @ 00:50  7.42/38/45/25/72.5  VBG Lactate: 1.5        Tox:         Urinalysis Basic - ( 09 May 2024 00:50 )    Color: x / Appearance: x / SG: x / pH: x  Gluc: 215 mg/dL / Ketone: x  / Bili: x / Urobili: x   Blood: x / Protein: x / Nitrite: x   Leuk Esterase: x / RBC: x / WBC x   Sq Epi: x / Non Sq Epi: x / Bacteria: x        WBC Trend: 9.43<--, 8.14<--, 12.10<--    Hb Trend: 9.2<--, 9.6<--, 9.6<--, 10.5<--, 10.1<--      Culture - Sputum (collected 05-08-24 @ 10:05)  Source: ET Tube ET Tube  Gram Stain (05-08-24 @ 20:37):    Few polymorphonuclear leukocytes per low power field    No Squamous epithelial cells per low power field    No organisms seen per oil power field        New imaging in last 24 hours:  Consult notes reviewed: Progress Note    JIMMY Glasgow MD 73y (1950) Male [id number]  04-29-24 (10d)      Chief Complaint: Unstable angina, abn EKG    Subjective:  No acute events overnight.   More awake; responding appropriately. Denies pain but wants ETT out.     Review of Systems:  No pain, wants ETT out.       PAST MEDICAL & SURGICAL HISTORY:  Diabetes [250.00]    Stage 5 chronic kidney disease not on chronic dialysis [N18.5]    Benign essential HTN [I10]    HLD (hyperlipidemia) [E78.5]    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 [N18.6]    ESRD on hemodialysis [N18.6]    No significant past surgical history [212605813]    AVF (arteriovenous fistula) [I77.0]    Refined to: Arteriovenous fistula    Arteriovenous fistula [I77.0]      aspirin  chewable 81 milliGRAM(s) Oral daily  chlorhexidine 0.12% Liquid 15 milliLiter(s) Oral Mucosa every 12 hours  chlorhexidine 2% Cloths 1 Application(s) Topical <User Schedule>  dextrose 10% Bolus 125 milliLiter(s) IV Bolus once  dextrose 5%. 1000 milliLiter(s) IV Continuous <Continuous>  dextrose 5%. 1000 milliLiter(s) IV Continuous <Continuous>  dextrose 50% Injectable 12.5 Gram(s) IV Push once  dextrose 50% Injectable 25 Gram(s) IV Push once  dextrose Oral Gel 15 Gram(s) Oral once PRN  epoetin reilly (EPOGEN) Injectable 15819 Unit(s) IV Push <User Schedule>  glucagon  Injectable 1 milliGRAM(s) IntraMuscular once  heparin   Injectable 5000 Unit(s) SubCutaneous every 12 hours  insulin lispro (ADMELOG) corrective regimen sliding scale   SubCutaneous every 6 hours  insulin NPH human recombinant 6 Unit(s) SubCutaneous every 6 hours  petrolatum Ophthalmic Ointment 1 Application(s) Both EYES two times a day    Objective:  T(C): 37.6 (05-09-24 @ 04:00), Max: 37.8 (05-08-24 @ 20:00)  HR: 67 (05-09-24 @ 06:44) (61 - 79)  BP: 137/66 (05-09-24 @ 06:00) (104/86 - 161/78)  RR: 14 (05-09-24 @ 06:00) (10 - 15)  SpO2: 97% (05-09-24 @ 06:44) (95% - 100%)    Physical exam:  GENERAL: NAD, well-developed  HEAD:  Atraumatic, Normocephalic  EYES: EOMI, PERRLA, conjunctiva and sclera clear  NECK: Supple, No JVD  CHEST/LUNG: Clear to auscultation bilaterally; No wheeze. ETT in place.   HEART: Regular rate and rhythm; No murmurs, rubs, or gallops  ABDOMEN: Soft, Nontender, Nondistended; Bowel sounds present  EXTREMITIES:  2+ Peripheral Pulses, No clubbing, cyanosis, or edema  PSYCH: AAO with ETT in place.   NEUROLOGY: non-focal. Moving all extremities to command. Weak cough.   SKIN: No rashes or lesions      05-07-24 @ 07:01  -  05-08-24 @ 07:00  --------------------------------------------------------  IN: 1950 mL / OUT: 1778 mL / NET: 172 mL    05-08-24 @ 07:01  -  05-09-24 @ 06:54  --------------------------------------------------------  IN: 1175 mL / OUT: 0 mL / NET: 1175 mL        CAPILLARY BLOOD GLUCOSE      (05-09 @ 00:50)                      9.2  9.43 )-----------( 155                 28.2    Neutrophils = 7.15 (75.8%)  Lymphocytes = 0.85 (9.0%)  Eosinophils = 0.37 (3.9%)  Basophils = 0.03 (0.3%)  Monocytes = 0.92 (9.8%)  Bands = --%    05-09    142  |  101  |  68<H>  ----------------------------<  215<H>  3.9   |  21<L>  |  5.81<H>    Ca    8.1<L>      09 May 2024 00:50  Phos  5.7     05-09  Mg     2.90     05-09    TPro  6.3  /  Alb  2.9<L>  /  TBili  0.3  /  DBili  x   /  AST  33  /  ALT  35  /  AlkPhos  104  05-09    ( 09 May 2024 00:50 )   PT: 11.6 sec;   INR: 1.03 ratio;       PTT:27.7 sec      RVP:    Venous Blood Gas:  05-09 @ 00:50  7.42/38/45/25/72.5  VBG Lactate: 1.5        Tox:         Urinalysis Basic - ( 09 May 2024 00:50 )    Color: x / Appearance: x / SG: x / pH: x  Gluc: 215 mg/dL / Ketone: x  / Bili: x / Urobili: x   Blood: x / Protein: x / Nitrite: x   Leuk Esterase: x / RBC: x / WBC x   Sq Epi: x / Non Sq Epi: x / Bacteria: x        WBC Trend: 9.43<--, 8.14<--, 12.10<--    Hb Trend: 9.2<--, 9.6<--, 9.6<--, 10.5<--, 10.1<--      Culture - Sputum (collected 05-08-24 @ 10:05)  Source: ET Tube ET Tube  Gram Stain (05-08-24 @ 20:37):    Few polymorphonuclear leukocytes per low power field    No Squamous epithelial cells per low power field    No organisms seen per oil power field        New imaging in last 24 hours:  Consult notes reviewed:

## 2024-05-09 NOTE — PROGRESS NOTE ADULT - SUBJECTIVE AND OBJECTIVE BOX
Cardiovascular Disease Progress Note  DATE OF SERVICE: 24 @ 09:01    Overnight events: No acute events overnight.    The patient remains intubated.        Objective Findings:  T(C): 37.6 (24 @ 04:00), Max: 37.8 (24 @ 20:00)  HR: 67 (24 @ 06:44) (61 - 79)  BP: 137/66 (24 @ 06:00) (104/86 - 161/78)  RR: 14 (24 @ 06:00) (10 - 15)  SpO2: 97% (24 @ 06:44) (95% - 100%)  Wt(kg): --  Daily     Daily Weight in k (09 May 2024 04:00)      Physical Exam:  Gen: NAD; Patient resting comfortably  HEENT:  Normocephalic/ atraumatic  CV: RRR, normal S1 + S2, no m/r/g  Lungs:  Normal respiratory effort; mechanical ventilation via ETT.   Abd: soft, non-tender; bowel sounds present  Ext: No edema; warm and well perfused    Telemetry: Sinus; no ectopy    Laboratory Data:                        9.2    9.43  )-----------( 155      ( 09 May 2024 00:50 )             28.2         142  |  101  |  68<H>  ----------------------------<  215<H>  3.9   |  21<L>  |  5.81<H>    Ca    8.1<L>      09 May 2024 00:50  Phos  5.7       Mg     2.90         TPro  6.3  /  Alb  2.9<L>  /  TBili  0.3  /  DBili  x   /  AST  33  /  ALT  35  /  AlkPhos  104      PT/INR - ( 09 May 2024 00:50 )   PT: 11.6 sec;   INR: 1.03 ratio         PTT - ( 09 May 2024 00:50 )  PTT:27.7 sec          Inpatient Medications:  MEDICATIONS  (STANDING):  aspirin  chewable 81 milliGRAM(s) Oral daily  chlorhexidine 0.12% Liquid 15 milliLiter(s) Oral Mucosa every 12 hours  chlorhexidine 2% Cloths 1 Application(s) Topical <User Schedule>  dextrose 10% Bolus 125 milliLiter(s) IV Bolus once  dextrose 5%. 1000 milliLiter(s) (50 mL/Hr) IV Continuous <Continuous>  dextrose 5%. 1000 milliLiter(s) (100 mL/Hr) IV Continuous <Continuous>  dextrose 50% Injectable 12.5 Gram(s) IV Push once  dextrose 50% Injectable 25 Gram(s) IV Push once  epoetin reilly (EPOGEN) Injectable 11056 Unit(s) IV Push <User Schedule>  glucagon  Injectable 1 milliGRAM(s) IntraMuscular once  heparin   Injectable 5000 Unit(s) SubCutaneous every 12 hours  insulin lispro (ADMELOG) corrective regimen sliding scale   SubCutaneous every 6 hours  insulin NPH human recombinant 6 Unit(s) SubCutaneous every 6 hours  petrolatum Ophthalmic Ointment 1 Application(s) Both EYES two times a day      Assessment: 73 year old man with HTN, HLD, T2DM on insulin, and ESRD on HD presents with supply demand ischemia and angina.    Plan of Care:    #Type II myocardial infarction-  Secondary to distributive shock.   ST deviation noted on EKG, however interpretation is limited by LVH with repolarization changes.  The repeat echo shows no new wall motion abnormality.   I would not pursue cath until his mental status is reassessed post extubation.   Medical management of NSTEMI.    ASA 81 mg daily         #Sinus bradycardia-  No evidence of AV block on admission EKG or telemetry.  No indication for pacing at this time.     #HTN-  Recent shock.  Pressors now off    #ESRD-  CRRT in the MICU.       Patient critically ill in the MICU             Over 55 minutes of critical care time spent on total encounter; more than 50% of the visit was spent counseling and/or coordinating care by the attending physician.      Geovani Bravo MD [facility]  Cardiovascular Disease  [phone]

## 2024-05-09 NOTE — PROGRESS NOTE ADULT - ASSESSMENT
73M w/ PMH of HTN, ESRD (MWF), IDDM, p/f chest pain c/b hiccups for the last 2 days undergoing ischemic evaluation per cardiology. Pt s/p RRT x 2 for AMS. MICU consulted and accepting for airway monitoring in setting of ? baclofen toxicity.     NEUROLOGY   #AMS  - patient with multiple RRTs for AMS  - CTH without acute findings   - may be due to baclofen toxicity  - LP negative  - cw HD/CRRT for concern of baclofen toxicity   - MRI showing L talya and L cerebellar stroke  - EEG negative  - weaned off sedation and now answering questions appropriately.     #Pontine and Cerebellar strokes  - MR from 5/6 showing R pontine and cerebellar infarcts  - unclear timeline but may be inciting event for resp failure  - neurology following  - previously treated with Plavix, heparin, ASA for NSTEMI  - will need either CTA head and and neck (coordinated with HD) or MRA head and neck    CARDIOVASCULAR   #Angina  - patient admitted with chest pain and noted to have peaked T waves on admission   - TTE showing EF 72%  - EKG 5/3 demonstrating ST deviation noted on EKG  - Per cardiology, no plan for cath at this time  - s/p DAPT load 5/4  - will c/w ASA 81 mg daily with heparin gtt x 48 hours (now complete)  - repeat TTE (5/4) demonstrating no significant interval changes    #HTN  - will hold bp meds in the setting of hypotension    RESPIRATORY   - intubated due to inability to protect airway    GI/NUTRITION   #Gastroporesis   - hold reglan for now     /RENAL   #ESRD MWF  - R fem shiley removed 5/2  - placement of IJ shiley 5/3  - pending fistula study of RUE (failed previous HD session)  - tolerating HD; plan for 2L removed today.       INFECTIOUS DISEASE   #leukocytosis  - noted on repeat labs  - follow up infectious workup  - 5 day course of zosyn (5/3 - 5/8)    ENDOCRINE   #IDD  - NPH 6u q6hrs  - cw ISS    HEMATOLOGY/ONCOLOGY  FELIX      DVT PPX  heparin SQ    ETHICS  Full code   73M w/ PMH of HTN, ESRD (MWF), IDDM, p/f chest pain c/b hiccups for the last 2 days undergoing ischemic evaluation per cardiology. Pt s/p RRT x 2 for AMS. MICU consulted and accepting for airway monitoring in setting of baclofen toxicity +/- stroke.     NEUROLOGY   #AMS  - patient with multiple RRTs for AMS  - CTH without acute findings   - may be due to baclofen toxicity  - LP negative  - cw HD/CRRT for concern of baclofen toxicity   - MRI showing L talya and L cerebellar stroke  - EEG negative  - weaned off sedation and now answering questions appropriately and initiating breaths.     #Pontine and Cerebellar strokes  - MR from 5/6 showing R pontine and cerebellar infarcts  - unclear timeline but may be inciting event for resp failure  - neurology following  - previously treated with Plavix, heparin, ASA for NSTEMI  - will need  MRA head and neck (followed by HD) to complete stoke workup.     CARDIOVASCULAR   #Angina  - patient admitted with chest pain and noted to have peaked T waves on admission   - TTE showing EF 72%  - EKG 5/3 demonstrating ST deviation noted on EKG  - Per cardiology, no plan for cath at this time  - s/p DAPT load 5/4  - will c/w ASA 81 mg daily with heparin gtt x 48 hours (now complete)  - repeat TTE (5/4) demonstrating no significant interval changes    #HTN  - will hold bp meds in the setting of hypotension    RESPIRATORY   - intubated due to inability to protect airway    GI/NUTRITION   #Gastroporesis   - hold reglan for now     /RENAL   #ESRD MWF  - R fem shiley removed 5/2  - placement of IJ shiley 5/3  - pending fistula study of RUE (failed previous HD session)  - tolerating HD plan for HD today after MRA      INFECTIOUS DISEASE   #leukocytosis  - noted on repeat labs  - follow up infectious workup  - 5 day course of zosyn (5/3 - 5/8)    ENDOCRINE   #IDD  - NPH 6u q6hrs  - cw ISS    HEMATOLOGY/ONCOLOGY  FELIX      DVT PPX  heparin SQ    ETHICS  Full code

## 2024-05-09 NOTE — PROGRESS NOTE ADULT - SUBJECTIVE AND OBJECTIVE BOX
Vascular Surgery Progress Note     Subjective:  Patient seen and examined on AM rounds. Remains intubated, off pressors, opens eyes to voice.      Vital Signs:  Vital Signs Last 24 Hrs  T(C): 37.6 (09 May 2024 04:00), Max: 37.8 (08 May 2024 20:00)  T(F): 99.6 (09 May 2024 04:00), Max: 100.1 (08 May 2024 20:00)  HR: 67 (09 May 2024 06:44) (61 - 79)  BP: 137/66 (09 May 2024 06:00) (104/86 - 161/78)  BP(mean): 88 (09 May 2024 06:00) (75 - 103)  RR: 14 (09 May 2024 06:00) (10 - 15)  SpO2: 97% (09 May 2024 06:44) (95% - 100%)    Parameters below as of 09 May 2024 06:00  Patient On (Oxygen Delivery Method): ventilator    O2 Concentration (%): 21    CAPILLARY BLOOD GLUCOSE      POCT Blood Glucose.: 256 mg/dL (09 May 2024 05:25)  POCT Blood Glucose.: 196 mg/dL (08 May 2024 23:18)  POCT Blood Glucose.: 257 mg/dL (08 May 2024 17:20)  POCT Blood Glucose.: 195 mg/dL (08 May 2024 11:42)      I&O's Detail    08 May 2024 07:01  -  09 May 2024 07:00  --------------------------------------------------------  IN:    Nepro with Carb Steady: 1175 mL  Total IN: 1175 mL    OUT:    Voided (mL): 0 mL  Total OUT: 0 mL    Total NET: 1175 mL            Physical Exam:  General: NAD, sedated but opens eyes to voice  HEENT: Normocephalic atraumatic  Respiratory: Intubated on vent  Cardio: regular rate  Vascular: extremities are warm and well perfused, palpable b/l radial pulses, R AVF w/ palpable thrill, R IJ shinicky in place    Labs:    05-09    142  |  101  |  68<H>  ----------------------------<  215<H>  3.9   |  21<L>  |  5.81<H>    Ca    8.1<L>      09 May 2024 00:50  Phos  5.7     05-09  Mg     2.90     05-09    TPro  6.3  /  Alb  2.9<L>  /  TBili  0.3  /  DBili  x   /  AST  33  /  ALT  35  /  AlkPhos  104  05-09    LIVER FUNCTIONS - ( 09 May 2024 00:50 )  Alb: 2.9 g/dL / Pro: 6.3 g/dL / ALK PHOS: 104 U/L / ALT: 35 U/L / AST: 33 U/L / GGT: x                                 9.2    9.43  )-----------( 155      ( 09 May 2024 00:50 )             28.2     PT/INR - ( 09 May 2024 00:50 )   PT: 11.6 sec;   INR: 1.03 ratio         PTT - ( 09 May 2024 00:50 )  PTT:27.7 sec

## 2024-05-09 NOTE — PROGRESS NOTE ADULT - SUBJECTIVE AND OBJECTIVE BOX
[facility], Division of Infectious Diseases  AUGUST Carbajal Y. Patel, S. Shah, G. Casimir  [phone]  ([phone] - weekdays after 5pm and weekends)    Name: JIMMY BLAND  Age/Gender: 73y Male  MRN: [id number]    Interval History:  Notes reviewed.   remains intubated  off keppra    Allergies: No Known Allergies      Objective:  Vitals:   T(F): 97.9 (05-09-24 @ 08:00), Max: 100.1 (05-08-24 @ 20:00)  HR: 62 (05-09-24 @ 10:28) (61 - 79)  BP: 156/70 (05-09-24 @ 10:00) (104/86 - 161/78)  RR: 15 (05-09-24 @ 10:00) (13 - 22)  SpO2: 100% (05-09-24 @ 10:28) (95% - 100%)  Physical Examination:  General: no acute distress  HEENT: anicteric, ETT  Lungs: clear to auscultation anteriorly  Heart: S1, S2, normal rate  Abdomen: Soft. Nondistended. NT  Extremities: No LE edema.   Skin: Warm. Dry.   neuro: alert, nodding his head to yes/ no questions    Laboratory Studies:  CBC:                       9.2    9.43  )-----------( 155      ( 09 May 2024 00:50 )             28.2     WBC Trend:  9.43 05-09-24 @ 00:50  8.14 05-08-24 @ 01:33  12.10 05-07-24 @ 00:30  16.47 05-06-24 @ 00:00  16.48 05-05-24 @ 16:30  18.14 05-05-24 @ 10:15  18.63 05-05-24 @ 03:10  23.05 05-04-24 @ 21:00  24.74 05-04-24 @ 14:55  25.40 05-04-24 @ 08:55  25.36 05-04-24 @ 04:00  22.65 05-04-24 @ 01:20  17.37 05-03-24 @ 21:20  12.26 05-03-24 @ 00:30    CMP: 05-09    142  |  101  |  68<H>  ----------------------------<  215<H>  3.9   |  21<L>  |  5.81<H>    Ca    8.1<L>      09 May 2024 00:50  Phos  5.7     05-09  Mg     2.90     05-09    TPro  6.3  /  Alb  2.9<L>  /  TBili  0.3  /  DBili  x   /  AST  33  /  ALT  35  /  AlkPhos  104  05-09      LIVER FUNCTIONS - ( 09 May 2024 00:50 )  Alb: 2.9 g/dL / Pro: 6.3 g/dL / ALK PHOS: 104 U/L / ALT: 35 U/L / AST: 33 U/L / GGT: x             Urinalysis Basic - ( 09 May 2024 00:50 )    Color: x / Appearance: x / SG: x / pH: x  Gluc: 215 mg/dL / Ketone: x  / Bili: x / Urobili: x   Blood: x / Protein: x / Nitrite: x   Leuk Esterase: x / RBC: x / WBC x   Sq Epi: x / Non Sq Epi: x / Bacteria: x      Microbiology: reviewed     Culture - Sputum (collected 05-08-24 @ 10:05)  Source: ET Tube ET Tube  Gram Stain (05-08-24 @ 20:37):    Few polymorphonuclear leukocytes per low power field    No Squamous epithelial cells per low power field    No organisms seen per oil power field    Culture - Sputum (collected 05-04-24 @ 04:10)  Source: ET Tube ET Tube  Gram Stain (05-04-24 @ 13:40):    Few polymorphonuclear leukocytes seen per low power field    No Squamous epithelial cells per low power field    Numerous Gram Positive Rods seen per oil power field    Rare Gram Positive Cocci in Clusters seen per oil power field  Final Report (05-05-24 @ 17:14):    Normal Respiratory Jocelyn present    Culture - CSF with Gram Stain (collected 05-02-24 @ 12:15)  Source: .CSF CSF lumbar  Gram Stain (05-02-24 @ 13:51):    polymorphonuclear leukocytes    No organisms seen    by cytocentrifuge  Final Report (05-07-24 @ 08:05):    No growth at 5 days    Culture - Fungal, CSF (collected 05-02-24 @ 12:15)  Source: .CSF CSF lumbar  Preliminary Report (05-04-24 @ 23:03):    Culture is being performed. Fungal cultures are held for 4 weeks.    Culture - Blood (collected 05-02-24 @ 03:45)  Source: .Blood Blood-Peripheral  Final Report (05-07-24 @ 10:01):    No growth at 5 days    Culture - Blood (collected 05-02-24 @ 03:15)  Source: .Blood Blood-Venous  Final Report (05-07-24 @ 10:01):    No growth at 5 days        Radiology: reviewed     Medications:  aspirin  chewable 81 milliGRAM(s) Oral daily  chlorhexidine 0.12% Liquid 15 milliLiter(s) Oral Mucosa every 12 hours  chlorhexidine 2% Cloths 1 Application(s) Topical <User Schedule>  dextrose 10% Bolus 125 milliLiter(s) IV Bolus once  dextrose 5%. 1000 milliLiter(s) IV Continuous <Continuous>  dextrose 5%. 1000 milliLiter(s) IV Continuous <Continuous>  dextrose 50% Injectable 12.5 Gram(s) IV Push once  dextrose 50% Injectable 25 Gram(s) IV Push once  dextrose Oral Gel 15 Gram(s) Oral once PRN  epoetin reilly (EPOGEN) Injectable 09263 Unit(s) IV Push <User Schedule>  glucagon  Injectable 1 milliGRAM(s) IntraMuscular once  heparin   Injectable 5000 Unit(s) SubCutaneous every 12 hours  insulin lispro (ADMELOG) corrective regimen sliding scale   SubCutaneous every 6 hours  insulin NPH human recombinant 9 Unit(s) SubCutaneous every 6 hours  petrolatum Ophthalmic Ointment 1 Application(s) Both EYES two times a day    Antimicrobials:

## 2024-05-09 NOTE — PROGRESS NOTE ADULT - SUBJECTIVE AND OBJECTIVE BOX
[facility]   MD ELIANE BHAGAT MD ANGELA WONG, PA QIAN CHEN, NP    TEL:  OFFICE: [phone]  From 5pm-7am Answering Service [phone]    -- RENAL FOLLOW UP NOTE ---Date of Service 05-09-24 @ 15:58    Patient is a 73y old  Male who presents with a chief complaint of Unstable angina, abn EKG.    Patient seen and examined at bedside in ICU.  Intubated but awake, alert, and w/o distress.    VITALS:  T(F): 99.2 (05-09-24 @ 12:00), Max: 100.1 (05-08-24 @ 20:00)  HR: 64 (05-09-24 @ 14:00)  BP: 153/96 (05-09-24 @ 14:00)  RR: 15 (05-09-24 @ 14:00)  SpO2: 100% (05-09-24 @ 14:00)  Wt(kg): --    05-08 @ 07:01  -  05-09 @ 07:00  --------------------------------------------------------  IN: 1175 mL / OUT: 0 mL / NET: 1175 mL      PHYSICAL EXAM:  General: NAD  Neck: No JVD  Respiratory: CTAB, no wheezes, rales or rhonchi.  Intubated; breathing unlabored and with even rise of chest.  Cardiovascular: S1, S2, RRR  Gastrointestinal: BS+, soft, NT/ND  Extremities: No peripheral edema    Hospital Medications:   MEDICATIONS  (STANDING):  aspirin  chewable 81 milliGRAM(s) Oral daily  chlorhexidine 0.12% Liquid 15 milliLiter(s) Oral Mucosa every 12 hours  chlorhexidine 2% Cloths 1 Application(s) Topical <User Schedule>  dextrose 10% Bolus 125 milliLiter(s) IV Bolus once  dextrose 5%. 1000 milliLiter(s) (50 mL/Hr) IV Continuous <Continuous>  dextrose 5%. 1000 milliLiter(s) (100 mL/Hr) IV Continuous <Continuous>  dextrose 50% Injectable 12.5 Gram(s) IV Push once  dextrose 50% Injectable 25 Gram(s) IV Push once  epoetin reilly (EPOGEN) Injectable 32318 Unit(s) IV Push <User Schedule>  glucagon  Injectable 1 milliGRAM(s) IntraMuscular once  heparin   Injectable 5000 Unit(s) SubCutaneous every 12 hours  insulin lispro (ADMELOG) corrective regimen sliding scale   SubCutaneous every 6 hours  insulin NPH human recombinant 9 Unit(s) SubCutaneous every 6 hours  petrolatum Ophthalmic Ointment 1 Application(s) Both EYES two times a day      LABS:  05-09    142  |  101  |  68<H>  ----------------------------<  215<H>  3.9   |  21<L>  |  5.81<H>    Ca    8.1<L>      09 May 2024 00:50  Phos  5.7     05-09  Mg     2.90     05-09    TPro  6.3  /  Alb  2.9<L>  /  TBili  0.3  /  DBili      /  AST  33  /  ALT  35  /  AlkPhos  104  05-09    Creatinine Trend: 5.81 <--, 3.94 <--, 3.77 <--, 1.83 <--, 2.06 <--, 2.39 <--, 2.95 <--, 3.47 <--, 4.38 <--, 5.30 <--, 6.47 <--, 7.57 <--, 9.22 <--, 7.72 <--    Albumin: 2.9 g/dL (05-09 @ 00:50)  Phosphorus: 5.7 mg/dL (05-09 @ 00:50)                          9.2    9.43  )-----------( 155      ( 09 May 2024 00:50 )             28.2     Urine Studies:  Urinalysis - [05-09-24 @ 00:50]      Color  / Appearance  / SG  / pH       Gluc 215 / Ketone   / Bili  / Urobili        Blood  / Protein  / Leuk Est  / Nitrite       RBC  / WBC  / Hyaline  / Gran  / Sq Epi  / Non Sq Epi  / Bacteria       Iron 47, TIBC --, %sat --      [05-01-24 @ 06:44]  TSH 2.79      [04-30-24 @ 06:03]    HBsAb <3.0      [05-01-24 @ 14:42]  HBcAb Nonreact      [05-01-24 @ 14:42]

## 2024-05-09 NOTE — PROGRESS NOTE ADULT - ASSESSMENT
73-year-old male past medical history hypertension, ESRD on dialysis Monday Wednesday Friday, diabetes insulin-dependent presents with hiccups patient endorses persistent hiccups for the last 2 days. Nephrology consulted for HD needs.    A/P  ESRD:  Center: [facility]  Nephrologist: Dr. Hardwick  Access: R AVF  MWF schedule  Vascular for fistulogram   s/p femoral shiley on 5/1, now removed  s/p placement of IJ shiley 5/3  Stopped CRRT   Restarted IHD  s/p HD 5/7 UF 1778; treatment terminated early due to hypotension   Pending HD today post MRA head/neck w/ gadolinium.  Will need to dialyze 3 days consecutively; today --> Sat.  Consent obtained and placed in ED chart  Renal diet  Monitor BMP    Hyperkalemia  HD as above  Low K diet.  Monitor closely     HTN:  BP fluctuating.  Off pressors.  Care per ICU.  Monitor BP.    Anemia:  SILVA w/ HD  Monitor Hb    CKD-MBD  Check PTH  PO4 suboptimal - consider calcium acetate 667mg TID.  Low PO4 diet.  Monitor Ca, PO4 daily    Hypocalcemia:  In setting of CKD vs. hypoalbuminemia.  Optimize albumin.  Monitor Ca.

## 2024-05-10 LAB
-  BLOOD PCR PANEL: SIGNIFICANT CHANGE UP
ALBUMIN SERPL ELPH-MCNC: 3.4 G/DL — SIGNIFICANT CHANGE UP (ref 3.3–5)
ALBUMIN SERPL ELPH-MCNC: 3.5 G/DL — SIGNIFICANT CHANGE UP (ref 3.3–5)
ALP SERPL-CCNC: 99 U/L — SIGNIFICANT CHANGE UP (ref 40–120)
ALP SERPL-CCNC: 99 U/L — SIGNIFICANT CHANGE UP (ref 40–120)
ALT FLD-CCNC: 38 U/L — SIGNIFICANT CHANGE UP (ref 4–41)
ALT FLD-CCNC: 39 U/L — SIGNIFICANT CHANGE UP (ref 4–41)
ANION GAP SERPL CALC-SCNC: 19 MMOL/L — HIGH (ref 7–14)
ANION GAP SERPL CALC-SCNC: 20 MMOL/L — HIGH (ref 7–14)
AST SERPL-CCNC: 25 U/L — SIGNIFICANT CHANGE UP (ref 4–40)
AST SERPL-CCNC: 34 U/L — SIGNIFICANT CHANGE UP (ref 4–40)
BASOPHILS # BLD AUTO: 0.06 K/UL — SIGNIFICANT CHANGE UP (ref 0–0.2)
BASOPHILS # BLD AUTO: 0.06 K/UL — SIGNIFICANT CHANGE UP (ref 0–0.2)
BASOPHILS NFR BLD AUTO: 0.4 % — SIGNIFICANT CHANGE UP (ref 0–2)
BASOPHILS NFR BLD AUTO: 0.5 % — SIGNIFICANT CHANGE UP (ref 0–2)
BILIRUB SERPL-MCNC: 0.5 MG/DL — SIGNIFICANT CHANGE UP (ref 0.2–1.2)
BILIRUB SERPL-MCNC: 0.5 MG/DL — SIGNIFICANT CHANGE UP (ref 0.2–1.2)
BLOOD GAS ARTERIAL COMPREHENSIVE RESULT: SIGNIFICANT CHANGE UP
BLOOD GAS ARTERIAL COMPREHENSIVE RESULT: SIGNIFICANT CHANGE UP
BLOOD GAS VENOUS COMPREHENSIVE RESULT: SIGNIFICANT CHANGE UP
BUN SERPL-MCNC: 39 MG/DL — HIGH (ref 7–23)
BUN SERPL-MCNC: 40 MG/DL — HIGH (ref 7–23)
CALCIUM SERPL-MCNC: 8.5 MG/DL — SIGNIFICANT CHANGE UP (ref 8.4–10.5)
CALCIUM SERPL-MCNC: 8.6 MG/DL — SIGNIFICANT CHANGE UP (ref 8.4–10.5)
CHLORIDE SERPL-SCNC: 99 MMOL/L — SIGNIFICANT CHANGE UP (ref 98–107)
CHLORIDE SERPL-SCNC: 99 MMOL/L — SIGNIFICANT CHANGE UP (ref 98–107)
CO2 SERPL-SCNC: 21 MMOL/L — LOW (ref 22–31)
CO2 SERPL-SCNC: 22 MMOL/L — SIGNIFICANT CHANGE UP (ref 22–31)
CREAT SERPL-MCNC: 3.99 MG/DL — HIGH (ref 0.5–1.3)
CREAT SERPL-MCNC: 4.23 MG/DL — HIGH (ref 0.5–1.3)
CULTURE RESULTS: SIGNIFICANT CHANGE UP
EGFR: 14 ML/MIN/1.73M2 — LOW
EGFR: 15 ML/MIN/1.73M2 — LOW
EOSINOPHIL # BLD AUTO: 0.3 K/UL — SIGNIFICANT CHANGE UP (ref 0–0.5)
EOSINOPHIL # BLD AUTO: 0.31 K/UL — SIGNIFICANT CHANGE UP (ref 0–0.5)
EOSINOPHIL NFR BLD AUTO: 2.1 % — SIGNIFICANT CHANGE UP (ref 0–6)
EOSINOPHIL NFR BLD AUTO: 2.4 % — SIGNIFICANT CHANGE UP (ref 0–6)
GLUCOSE BLDC GLUCOMTR-MCNC: 142 MG/DL — HIGH (ref 70–99)
GLUCOSE BLDC GLUCOMTR-MCNC: 151 MG/DL — HIGH (ref 70–99)
GLUCOSE BLDC GLUCOMTR-MCNC: 152 MG/DL — HIGH (ref 70–99)
GLUCOSE BLDC GLUCOMTR-MCNC: 168 MG/DL — HIGH (ref 70–99)
GLUCOSE SERPL-MCNC: 147 MG/DL — HIGH (ref 70–99)
GLUCOSE SERPL-MCNC: 148 MG/DL — HIGH (ref 70–99)
GRAM STN FLD: ABNORMAL
GRAM STN FLD: ABNORMAL
HCT VFR BLD CALC: 32.5 % — LOW (ref 39–50)
HCT VFR BLD CALC: 32.9 % — LOW (ref 39–50)
HGB BLD-MCNC: 10.7 G/DL — LOW (ref 13–17)
HGB BLD-MCNC: 10.7 G/DL — LOW (ref 13–17)
IANC: 10.87 K/UL — HIGH (ref 1.8–7.4)
IANC: 11.79 K/UL — HIGH (ref 1.8–7.4)
IMM GRANULOCYTES NFR BLD AUTO: 0.6 % — SIGNIFICANT CHANGE UP (ref 0–0.9)
IMM GRANULOCYTES NFR BLD AUTO: 0.7 % — SIGNIFICANT CHANGE UP (ref 0–0.9)
INR BLD: 1.07 RATIO — SIGNIFICANT CHANGE UP (ref 0.85–1.18)
LYMPHOCYTES # BLD AUTO: 0.67 K/UL — LOW (ref 1–3.3)
LYMPHOCYTES # BLD AUTO: 1.16 K/UL — SIGNIFICANT CHANGE UP (ref 1–3.3)
LYMPHOCYTES # BLD AUTO: 5.2 % — LOW (ref 13–44)
LYMPHOCYTES # BLD AUTO: 8.1 % — LOW (ref 13–44)
MAGNESIUM SERPL-MCNC: 2.4 MG/DL — SIGNIFICANT CHANGE UP (ref 1.6–2.6)
MAGNESIUM SERPL-MCNC: 2.5 MG/DL — SIGNIFICANT CHANGE UP (ref 1.6–2.6)
MCHC RBC-ENTMCNC: 20.4 PG — LOW (ref 27–34)
MCHC RBC-ENTMCNC: 20.5 PG — LOW (ref 27–34)
MCHC RBC-ENTMCNC: 32.5 GM/DL — SIGNIFICANT CHANGE UP (ref 32–36)
MCHC RBC-ENTMCNC: 32.9 GM/DL — SIGNIFICANT CHANGE UP (ref 32–36)
MCV RBC AUTO: 62.3 FL — LOW (ref 80–100)
MCV RBC AUTO: 62.7 FL — LOW (ref 80–100)
METHOD TYPE: SIGNIFICANT CHANGE UP
MONOCYTES # BLD AUTO: 0.79 K/UL — SIGNIFICANT CHANGE UP (ref 0–0.9)
MONOCYTES # BLD AUTO: 0.88 K/UL — SIGNIFICANT CHANGE UP (ref 0–0.9)
MONOCYTES NFR BLD AUTO: 6.2 % — SIGNIFICANT CHANGE UP (ref 2–14)
MONOCYTES NFR BLD AUTO: 6.2 % — SIGNIFICANT CHANGE UP (ref 2–14)
NEUTROPHILS # BLD AUTO: 10.87 K/UL — HIGH (ref 1.8–7.4)
NEUTROPHILS # BLD AUTO: 11.79 K/UL — HIGH (ref 1.8–7.4)
NEUTROPHILS NFR BLD AUTO: 82.5 % — HIGH (ref 43–77)
NEUTROPHILS NFR BLD AUTO: 85.1 % — HIGH (ref 43–77)
NRBC # BLD: 0 /100 WBCS — SIGNIFICANT CHANGE UP (ref 0–0)
NRBC # BLD: 0 /100 WBCS — SIGNIFICANT CHANGE UP (ref 0–0)
NRBC # FLD: 0.02 K/UL — HIGH (ref 0–0)
NRBC # FLD: 0.02 K/UL — HIGH (ref 0–0)
PHOSPHATE SERPL-MCNC: 4.6 MG/DL — HIGH (ref 2.5–4.5)
PHOSPHATE SERPL-MCNC: 5.2 MG/DL — HIGH (ref 2.5–4.5)
PLATELET # BLD AUTO: 220 K/UL — SIGNIFICANT CHANGE UP (ref 150–400)
PLATELET # BLD AUTO: 244 K/UL — SIGNIFICANT CHANGE UP (ref 150–400)
POTASSIUM SERPL-MCNC: 3.9 MMOL/L — SIGNIFICANT CHANGE UP (ref 3.5–5.3)
POTASSIUM SERPL-MCNC: 4.5 MMOL/L — SIGNIFICANT CHANGE UP (ref 3.5–5.3)
POTASSIUM SERPL-SCNC: 3.9 MMOL/L — SIGNIFICANT CHANGE UP (ref 3.5–5.3)
POTASSIUM SERPL-SCNC: 4.5 MMOL/L — SIGNIFICANT CHANGE UP (ref 3.5–5.3)
PROT SERPL-MCNC: 7.4 G/DL — SIGNIFICANT CHANGE UP (ref 6–8.3)
PROT SERPL-MCNC: 7.4 G/DL — SIGNIFICANT CHANGE UP (ref 6–8.3)
PROTHROM AB SERPL-ACNC: 12 SEC — SIGNIFICANT CHANGE UP (ref 9.5–13)
RBC # BLD: 5.22 M/UL — SIGNIFICANT CHANGE UP (ref 4.2–5.8)
RBC # BLD: 5.25 M/UL — SIGNIFICANT CHANGE UP (ref 4.2–5.8)
RBC # FLD: 20.1 % — HIGH (ref 10.3–14.5)
RBC # FLD: 20.3 % — HIGH (ref 10.3–14.5)
SODIUM SERPL-SCNC: 139 MMOL/L — SIGNIFICANT CHANGE UP (ref 135–145)
SODIUM SERPL-SCNC: 141 MMOL/L — SIGNIFICANT CHANGE UP (ref 135–145)
SPECIMEN SOURCE: SIGNIFICANT CHANGE UP
TROPONIN T, HIGH SENSITIVITY RESULT: 119 NG/L — CRITICAL HIGH
TROPONIN T, HIGH SENSITIVITY RESULT: 123 NG/L — CRITICAL HIGH
TROPONIN T, HIGH SENSITIVITY RESULT: 87 NG/L — CRITICAL HIGH
VANCOMYCIN TROUGH SERPL-MCNC: 14.9 UG/ML — SIGNIFICANT CHANGE UP (ref 10–20)
WBC # BLD: 12.78 K/UL — HIGH (ref 3.8–10.5)
WBC # BLD: 14.29 K/UL — HIGH (ref 3.8–10.5)
WBC # FLD AUTO: 12.78 K/UL — HIGH (ref 3.8–10.5)
WBC # FLD AUTO: 14.29 K/UL — HIGH (ref 3.8–10.5)

## 2024-05-10 PROCEDURE — 71045 X-RAY EXAM CHEST 1 VIEW: CPT | Mod: 26,76

## 2024-05-10 PROCEDURE — 99291 CRITICAL CARE FIRST HOUR: CPT | Mod: GC

## 2024-05-10 RX ORDER — VANCOMYCIN HCL 1 G
1000 VIAL (EA) INTRAVENOUS ONCE
Refills: 0 | Status: COMPLETED | OUTPATIENT
Start: 2024-05-10 | End: 2024-05-10

## 2024-05-10 RX ORDER — NITROGLYCERIN 6.5 MG
10 CAPSULE, EXTENDED RELEASE ORAL
Qty: 50 | Refills: 0 | Status: DISCONTINUED | OUTPATIENT
Start: 2024-05-10 | End: 2024-05-10

## 2024-05-10 RX ORDER — DEXTROSE 10 % IN WATER 10 %
1000 INTRAVENOUS SOLUTION INTRAVENOUS
Refills: 0 | Status: DISCONTINUED | OUTPATIENT
Start: 2024-05-10 | End: 2024-05-10

## 2024-05-10 RX ORDER — VANCOMYCIN HCL 1 G
1000 VIAL (EA) INTRAVENOUS ONCE
Refills: 0 | Status: DISCONTINUED | OUTPATIENT
Start: 2024-05-10 | End: 2024-05-10

## 2024-05-10 RX ORDER — MEROPENEM 1 G/30ML
500 INJECTION INTRAVENOUS EVERY 24 HOURS
Refills: 0 | Status: DISCONTINUED | OUTPATIENT
Start: 2024-05-10 | End: 2024-05-10

## 2024-05-10 RX ORDER — ACETAMINOPHEN 500 MG
1000 TABLET ORAL ONCE
Refills: 0 | Status: COMPLETED | OUTPATIENT
Start: 2024-05-10 | End: 2024-05-10

## 2024-05-10 RX ORDER — DEXTROSE 50 % IN WATER 50 %
25 SYRINGE (ML) INTRAVENOUS ONCE
Refills: 0 | Status: DISCONTINUED | OUTPATIENT
Start: 2024-05-10 | End: 2024-05-10

## 2024-05-10 RX ADMIN — HEPARIN SODIUM 5000 UNIT(S): 5000 INJECTION INTRAVENOUS; SUBCUTANEOUS at 17:34

## 2024-05-10 RX ADMIN — MEROPENEM 100 MILLIGRAM(S): 1 INJECTION INTRAVENOUS at 03:46

## 2024-05-10 RX ADMIN — Medication 400 MILLIGRAM(S): at 02:45

## 2024-05-10 RX ADMIN — Medication 3 MICROGRAM(S)/MIN: at 03:13

## 2024-05-10 RX ADMIN — ERYTHROPOIETIN 10000 UNIT(S): 10000 INJECTION, SOLUTION INTRAVENOUS; SUBCUTANEOUS at 08:49

## 2024-05-10 RX ADMIN — CHLORHEXIDINE GLUCONATE 1 APPLICATION(S): 213 SOLUTION TOPICAL at 06:04

## 2024-05-10 RX ADMIN — HEPARIN SODIUM 5000 UNIT(S): 5000 INJECTION INTRAVENOUS; SUBCUTANEOUS at 05:40

## 2024-05-10 RX ADMIN — Medication 250 MILLIGRAM(S): at 05:38

## 2024-05-10 RX ADMIN — Medication 2: at 12:38

## 2024-05-10 RX ADMIN — Medication 1000 MILLIGRAM(S): at 03:30

## 2024-05-10 RX ADMIN — Medication 3 MICROGRAM(S)/MIN: at 02:09

## 2024-05-10 RX ADMIN — Medication 2: at 23:27

## 2024-05-10 NOTE — PROGRESS NOTE ADULT - ASSESSMENT
73M PMHx HTN, ESRD, IDDM p/w hiccups and started on baclofen with concern for AMS overnight and desaturation, ?cheye nascimento respirations. Labs with numerous metabolic derangements. CTH with bifrontal encephalomalacia, likley traumatic.   WBC inc significantly, now intubated. Exam limited as on propofol, also on pressors.   s/p LP for meningitis concerns however appears bland - not on antibiotics  EEG with GPDs, no seizures  MR brain with punctuate L pontine and L cerebellar infarcts  MRA H/N with mild basilar fenestration, otherwise neg  mental status improving post extubation     AMS of unclear etiology - ? baclofen toxicity, no evidence of seizures. Metabolic encephalopathy  L pontine and L cerebellar strokes - embolic appearing  Intractable hiccups  hypoxic resp failure  ESRD on HD    MRI, MRA reviewed, as above  cont aspirin 81mg  TTE reviewed, no need to repeat for now  Keppra started for GPDs, no improvement in mental status - now better off keppra  continue to monitor off Keppra, avoid baclofen  vent mgmt per ICU  HD per nephro  f/u remainder of CSF - neg thus far  s/p Zosyn for pna  Dvt ppx

## 2024-05-10 NOTE — PROGRESS NOTE ADULT - SUBJECTIVE AND OBJECTIVE BOX
Neurology Progress Note    S: Patient seen and examined. Extubated    Medications: MEDICATIONS  (STANDING):  aspirin  chewable 81 milliGRAM(s) Oral daily  chlorhexidine 2% Cloths 1 Application(s) Topical <User Schedule>  dextrose 10% Bolus 125 milliLiter(s) IV Bolus once  dextrose 5%. 1000 milliLiter(s) (100 mL/Hr) IV Continuous <Continuous>  dextrose 5%. 1000 milliLiter(s) (50 mL/Hr) IV Continuous <Continuous>  dextrose 50% Injectable 25 Gram(s) IV Push once  dextrose 50% Injectable 12.5 Gram(s) IV Push once  epoetin rielly (EPOGEN) Injectable 62330 Unit(s) IV Push <User Schedule>  glucagon  Injectable 1 milliGRAM(s) IntraMuscular once  heparin   Injectable 5000 Unit(s) SubCutaneous every 12 hours  insulin lispro (ADMELOG) corrective regimen sliding scale   SubCutaneous every 6 hours    MEDICATIONS  (PRN):  dextrose Oral Gel 15 Gram(s) Oral once PRN Blood Glucose LESS THAN 70 milliGRAM(s)/deciliter       Vitals:  Vital Signs Last 24 Hrs  T(C): 37.8 (10 May 2024 20:00), Max: 38.6 (10 May 2024 04:10)  T(F): 100 (10 May 2024 20:00), Max: 101.4 (10 May 2024 04:10)  HR: 83 (10 May 2024 22:00) (82 - 132)  BP: 110/61 (10 May 2024 22:00) (83/61 - 205/91)  BP(mean): 74 (10 May 2024 22:00) (65 - 127)  RR: 27 (10 May 2024 22:00) (20 - 41)  SpO2: 96% (10 May 2024 22:00) (82% - 100%)    Parameters below as of 10 May 2024 22:00  Patient On (Oxygen Delivery Method): nasal cannula, high flow  O2 Flow (L/min): 55  O2 Concentration (%): 50        General Exam:   General Appearance: NAD  Head: Normocephalic, atraumatic and no dysmorphic features  Ear, Nose, and Throat: Moist mucous membranes  CVS: S1S2+  Resp: on HFNC  Abd: soft NTND  Extremities: No edema, no cyanosis  Skin: No bruises, no rashes     Neurological Exam:  Mental Status: awake, tracks, follows simple commands, no verbal output  Cranial Nerves: pupils 1-2mm, EOMI, no clear facial asymmetry  Motor: slight inc tone. Moves all extremities antigravity   Sensation:  Wd to noxious x4    I personally reviewed the below data/images/labs:    LABS:                          10.7   14.29 )-----------( 244      ( 10 May 2024 00:50 )             32.9     05-10    141  |  99  |  40<H>  ----------------------------<  148<H>  4.5   |  22  |  4.23<H>    Ca    8.6      10 May 2024 00:50  Phos  5.2     05-10  Mg     2.50     05-10    TPro  7.4  /  Alb  3.5  /  TBili  0.5  /  DBili  x   /  AST  34  /  ALT  39  /  AlkPhos  99  05-10    LIVER FUNCTIONS - ( 10 May 2024 00:50 )  Alb: 3.5 g/dL / Pro: 7.4 g/dL / ALK PHOS: 99 U/L / ALT: 39 U/L / AST: 34 U/L / GGT: x           PT/INR - ( 10 May 2024 00:50 )   PT: 12.0 sec;   INR: 1.07 ratio         PTT - ( 09 May 2024 00:50 )  PTT:27.7 sec  Urinalysis Basic - ( 10 May 2024 00:50 )    Color: x / Appearance: x / SG: x / pH: x  Gluc: 148 mg/dL / Ketone: x  / Bili: x / Urobili: x   Blood: x / Protein: x / Nitrite: x   Leuk Esterase: x / RBC: x / WBC x   Sq Epi: x / Non Sq Epi: x / Bacteria: x            CT Head No Cont:  (01 May 2024 18:11)  < from: CT Head No Cont (05.01.24 @ 18:11) >    ACC: [id number] EXAM:  CT BRAIN   ORDERED BY: SHEBLY MOREL     PROCEDURE DATE:  05/01/2024          INTERPRETATION:  CT OF THE HEAD WITHOUT CONTRAST    CLINICAL INDICATION: Lethargy.    TECHNIQUE: Volumetric CT acquisition was performed through the brain and   reviewed using brain and bone window technique.      COMPARISON: CT head from 1/17/2024    FINDINGS:    The ventricular and sulcal size and configuration is age appropriate.     There is no acute loss of gray-white differentiation. Stable bilateral   inferior frontal encephalomalacia and gliosis likely related to remote   trauma.    There is no evidence of mass effect, midline shift, acute intracranial   hemorrhage, or extra-axial collections.     The calvarium is intact. The paranasalsinuses are clear.The mastoid air   cells are predominantly clear. The orbits are unremarkable.      IMPRESSION:  No acute intracranial hemorrhage or acute territorial infarction. Chronic   findings as above.    --- End of Report ---          GILDARDO KHAN; Resident Radiologist  This document has been electronically signed.  LADARIUS DENNY MD; Attending Radiologist  This document has been electronically signed. May  1 2024  7:54PM    < end of copied text >      EEG Classification / Summary:  Abnormal EEG in the awake, drowsy states.   -Generalized sharp waves with triphasic morphology, initially appearing as frequent GPDs at 1-1.5 Hz, decreasing in prevalence later in recording.  -Variable moderate to mild diffuse slowing.  -No electrographic seizures are captured.     Clinical Impression:  -Generalized sharp waves with triphasic morphology can be seen in toxic-metabolic encephalopathies and indicate some risk of generalized seizures, especially when at higher frequencies than in this study. These decrease in prevalence over the course of recording.  -Variable moderate to mild diffuse cerebral dysfunction is nonspecific in etiology.   -No seizures x2 days of recording.    m< from: MR Head w/wo IV Cont (05.06.24 @ 18:10) >    ACC: [id number] EXAM:  MR BRAIN WAW IC   ORDERED BY: DOLORES QUICK     PROCEDURE DATE:  05/06/2024          INTERPRETATION:  CLINICAL INDICATION: Altered mental status.    TECHNIQUE: Multi-planar, multi-sequence magnetic resonance imaging of the   brain was performed with and without intravenous contrast.    CONTRAST: Post-contrast MR images were obtained following infusion of 5   mL of Gadavist    COMPARISON: No prior studies are available for comparison    FINDINGS:    VENTRICLES AND SULCI:  Normal.  INTRA-AXIAL: Punctate foci of restricted diffusion in the left talya and   left cerebellum with associated T2 prolongation consistent with acute   infarcts. No acute intracranial hemorrhage. Encephalomalacia/gliosis   noted in the inferior frontal lobes. No abnormal enhancement. There are   patchy and confluent foci of hyperintense T2 signal within the   subcortical and periventricular white matter which are nonspecific but   likely related to chronic microvascular ischemic disease.  EXTRA-AXIAL:  No mass or collection.  VISUALIZED SINUSES: Mild polypoid mucosal thickening. Fluid noted in the   nasopharynx.  VISUALIZED MASTOIDS:  Clear.  CALVARIUM:  Normal.  CAROTID FLOW VOIDS: Normal.  MISCELLANEOUS:  None.    IMPRESSION: PUNCTATE FOCI OF RESTRICTED DIFFUSION IN THE LEFT TALYA AND   LEFT CEREBELLUM WITH ASSOCIATED T2 PROLONGATION CONSISTENT WITH ACUTE   INFARCTS. NO ACUTE INTRACRANIAL HEMORRHAGE. NO AREA OF ABNORMAL   ENHANCEMENT.    < end of copied text >    m< from: MR Angio Head No Cont (05.09.24 @ 15:58) >    ACC: [id number] EXAM:  MR ANGIO NECK WAW IC   ORDERED BY: OMAR NESBITT     ACC: [id number] EXAM:  MR ANGIO BRAIN   ORDERED BY: OMAR NESBITT     PROCEDURE DATE:  05/09/2024          INTERPRETATION:  .    CLINICAL INFORMATION: Stroke workup. Talya/cerebellar stroke.    TECHNIQUE: MRA images through the neck and [place] were obtained   using a combination of 2-D and 3-D time-of-flight acquisition. Post   contrast MR angiography of the neck was also performed. The data was then   reformatted intoa volumetric data set and reviewed as rotational MIP   images. 5 cc's of IV Gadavist was administered without immediate   complication. 2.5 cc's was discarded.    COMPARISON: Prior head CT exam dated 5/1/2024.    FINDINGS:    MRA Neck: There is a standard anatomic configuration to the aortic arch.    The origins of the great vessels appear unremarkable. The bilateral   common carotid arteries and carotid bifurcations appear unremarkable.    The bilateral cervical internal carotid arteries are within normal limits.    The origins of the bilateral vertebral arteries are normal. The bilateral   cervical vertebral arteries are normal in course and caliber.    MRA [place]: The bilateral intracranial internal carotid,   anterior, and middle cerebral arteries appear unremarkable.    The anterior and bilateral posterior communicating arteries are notable.    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 Otherwise, the   bilateral intradural vertebral arteries, vertebrobasilar junction,   basilar artery, and basilar tip appear unremarkable as well as the   bilateral posterior cerebral arteries.    IMPRESSION: No large vessel occlusion or stenosis.    < end of copied text >

## 2024-05-10 NOTE — PROGRESS NOTE ADULT - ASSESSMENT
73M w/ PMHx hypertension, ESRD on HD via R radiocephalic fistula (MWF), DM, HTN, p/w hiccups and peaked T waves, admitted to MICU for c/f baclofen toxicity. Patient received 1x HD on admission. R femoral shiley removed 5/2, had 2 RRT for AMS and failed HD via AVF 5/3. S/p emergent R IJ shiley placement 5/3, started CRRT which is now transitioned back to iHD. Vascular planning RUE fistulogram when medically optimized.     Recommendations:   - Patient extubated to NC  - F/u fever/infectious workup  - S/p successful HD via R IJ shiley  - Planning for RUE fistulogram Thursday 5/16 w/ Dr. Lockett.  - Global care per primary     Vascular Surgery  [id number]

## 2024-05-10 NOTE — PROGRESS NOTE ADULT - SUBJECTIVE AND OBJECTIVE BOX
Cardiovascular Disease Progress Note  DATE OF SERVICE: 05-10-24 @ 09:23    Overnight events: No acute events overnight.    The patient is in no distress on HFNC.  HD underway at bedside       Objective Findings:  T(C): 37.1 (05-10-24 @ 06:25), Max: 38.6 (05-10-24 @ 04:10)  HR: 97 (05-10-24 @ 08:01) (57 - 132)  BP: 107/64 (05-10-24 @ 08:00) (83/61 - 205/91)  RR: 25 (05-10-24 @ 08:01) (10 - 41)  SpO2: 90% (05-10-24 @ 08:01) (89% - 100%)  Wt(kg): --  Daily     Daily Weight in k (10 May 2024 04:10)      Physical Exam:  Gen: NAD; Patient resting comfortably  HEENT:   Normocephalic/ atraumatic  CV: RRR, normal S1 + S2   Lungs:  Normal respiratory effort; fair air entry to auscultation bilaterally  Abd: soft, non-tender; bowel sounds present  Ext: No edema; warm and well perfused    Telemetry: Sinus    Laboratory Data:                        10.7   14.29 )-----------( 244      ( 10 May 2024 00:50 )             32.9     05-10    141  |  99  |  40<H>  ----------------------------<  148<H>  4.5   |  22  |  4.23<H>    Ca    8.6      10 May 2024 00:50  Phos  5.2     05-10  Mg     2.50     05-10    TPro  7.4  /  Alb  3.5  /  TBili  0.5  /  DBili  x   /  AST  34  /  ALT  39  /  AlkPhos  99  05-10    PT/INR - ( 10 May 2024 00:50 )   PT: 12.0 sec;   INR: 1.07 ratio         PTT - ( 09 May 2024 00:50 )  PTT:27.7 sec          Inpatient Medications:  MEDICATIONS  (STANDING):  aspirin  chewable 81 milliGRAM(s) Oral daily  chlorhexidine 2% Cloths 1 Application(s) Topical <User Schedule>  dextrose 10% Bolus 125 milliLiter(s) IV Bolus once  dextrose 5%. 1000 milliLiter(s) (100 mL/Hr) IV Continuous <Continuous>  dextrose 5%. 1000 milliLiter(s) (50 mL/Hr) IV Continuous <Continuous>  dextrose 50% Injectable 25 Gram(s) IV Push once  dextrose 50% Injectable 12.5 Gram(s) IV Push once  epoetin reilly (EPOGEN) Injectable 88116 Unit(s) IV Push <User Schedule>  glucagon  Injectable 1 milliGRAM(s) IntraMuscular once  heparin   Injectable 5000 Unit(s) SubCutaneous every 12 hours  insulin lispro (ADMELOG) corrective regimen sliding scale   SubCutaneous every 6 hours  insulin NPH human recombinant 9 Unit(s) SubCutaneous every 6 hours  meropenem  IVPB 500 milliGRAM(s) IV Intermittent every 24 hours  nitroglycerin  Infusion 10 MICROgram(s)/Min (3 mL/Hr) IV Continuous <Continuous>      Assessment: 73 year old man with HTN, HLD, T2DM on insulin, and ESRD on HD presents with supply demand ischemia and angina.    Plan of Care:    #Type II myocardial infarction-  Secondary to distributive shock earlier on admission. .   ST deviation noted on EKG, however interpretation is limited by LVH with repolarization changes.  The repeat echo shows no new wall motion abnormality.   I would not pursue cath until his mental status is reassessed post extubation.   Medical management of NSTEMI.    Please stop trending troponins, as it will not .   ASA 81 mg daily         #Sinus bradycardia-  No evidence of AV block on admission EKG or telemetry.  No indication for pacing at this time.     #HTN-  Recent shock.  Pressors now off    #ESRD-  CRRT in the MICU.       Patient critically ill in the MICU             Over 39 minutes of critical care time spent on total encounter; more than 50% of the visit was spent counseling and/or coordinating care by the attending physician.      Geovani Bravo MD [facility]  Cardiovascular Disease  [phone]

## 2024-05-10 NOTE — PROGRESS NOTE ADULT - SUBJECTIVE AND OBJECTIVE BOX
Vascular Surgery Progress Note     Subjective:  Patient seen and examined on AM rounds. Tmax 101.4 w/ increasing leukocytosis and lactate to 2.7. Patient extubated to HFNC.      Vital Signs:  Vital Signs Last 24 Hrs  T(C): 37.1 (10 May 2024 06:25), Max: 38.6 (10 May 2024 04:10)  T(F): 98.8 (10 May 2024 06:25), Max: 101.4 (10 May 2024 04:10)  HR: 97 (10 May 2024 08:01) (57 - 132)  BP: 107/64 (10 May 2024 08:00) (83/61 - 205/91)  BP(mean): 77 (10 May 2024 08:00) (65 - 127)  RR: 25 (10 May 2024 08:01) (10 - 41)  SpO2: 90% (10 May 2024 08:01) (89% - 100%)    Parameters below as of 10 May 2024 08:01  Patient On (Oxygen Delivery Method): nasal cannula, high flow  O2 Flow (L/min): 55  O2 Concentration (%): 100    CAPILLARY BLOOD GLUCOSE      POCT Blood Glucose.: 168 mg/dL (10 May 2024 05:30)  POCT Blood Glucose.: 125 mg/dL (09 May 2024 23:16)  POCT Blood Glucose.: 135 mg/dL (09 May 2024 17:35)  POCT Blood Glucose.: 174 mg/dL (09 May 2024 12:20)      I&O's Detail    09 May 2024 07:01  -  10 May 2024 07:00  --------------------------------------------------------  IN:    IV PiggyBack: 150 mL    Nitroglycerin: 35 mL    Other (mL): 600 mL  Total IN: 785 mL    OUT:    Nepro with Carb Steady: 0 mL    Oral Fluid: 0 mL    Other (mL): 2173 mL  Total OUT: 2173 mL    Total NET: -1388 mL            Physical Exam:  General: NAD, awake and alert  HEENT: Normocephalic atraumatic  Respiratory: Nonlabored respirations, HFNC  Cardio: regular rate  Vascular: extremities are warm and well perfused, palpable b/l radial pulses, R AVF w/ faint palpable thrill, R IJ shiley in place    Labs:    05-10    141  |  99  |  40<H>  ----------------------------<  148<H>  4.5   |  22  |  4.23<H>    Ca    8.6      10 May 2024 00:50  Phos  5.2     05-10  Mg     2.50     05-10    TPro  7.4  /  Alb  3.5  /  TBili  0.5  /  DBili  x   /  AST  34  /  ALT  39  /  AlkPhos  99  05-10    LIVER FUNCTIONS - ( 10 May 2024 00:50 )  Alb: 3.5 g/dL / Pro: 7.4 g/dL / ALK PHOS: 99 U/L / ALT: 39 U/L / AST: 34 U/L / GGT: x                                 10.7   14.29 )-----------( 244      ( 10 May 2024 00:50 )             32.9     PT/INR - ( 10 May 2024 00:50 )   PT: 12.0 sec;   INR: 1.07 ratio         PTT - ( 09 May 2024 00:50 )  PTT:27.7 sec

## 2024-05-10 NOTE — PROGRESS NOTE ADULT - ASSESSMENT
73M w/ PMH of HTN, ESRD (MWF), IDDM, p/f chest pain c/b hiccups for the last 2 days undergoing ischemic evaluation per cardiology. Pt s/p RRT x 2 for AMS. MICU consulted and accepting for airway monitoring in setting of baclofen toxicity +/- stroke.     NEUROLOGY   #AMS  - patient with multiple RRTs for AMS  - CTH without acute findings   - may be due to baclofen toxicity  - LP negative  - cw HD/CRRT for concern of baclofen toxicity   - MRI showing L talya and L cerebellar stroke  - EEG negative  - weaned off sedation and now answering questions appropriately and initiating breaths.     #Pontine and Cerebellar strokes  - MR from 5/6 showing R pontine and cerebellar infarcts  - unclear timeline but may be inciting event for resp failure  - neurology following  - previously treated with Plavix, heparin, ASA for NSTEMI  - will need  MRA head and neck (followed by HD) to complete stoke workup.     CARDIOVASCULAR   #Angina  - patient admitted with chest pain and noted to have peaked T waves on admission   - TTE showing EF 72%  - EKG 5/3 demonstrating ST deviation noted on EKG  - Per cardiology, no plan for cath at this time  - s/p DAPT load 5/4  - will c/w ASA 81 mg daily with heparin gtt x 48 hours (now complete)  - repeat TTE (5/4) demonstrating no significant interval changes    #HTN  - will hold bp meds in the setting of hypotension    RESPIRATORY   - intubated due to inability to protect airway    GI/NUTRITION   #Gastroporesis   - hold reglan for now     /RENAL   #ESRD MWF  - R fem shiley removed 5/2  - placement of IJ shiley 5/3  - pending fistula study of RUE (failed previous HD session)  - tolerating HD plan for HD today after MRA      INFECTIOUS DISEASE   #leukocytosis  - noted on repeat labs  - follow up infectious workup  - 5 day course of zosyn (5/3 - 5/8)    ENDOCRINE   #IDD  - NPH 6u q6hrs  - cw ISS    HEMATOLOGY/ONCOLOGY  FELIX      DVT PPX  heparin SQ    ETHICS  Full code   73M w/ PMH of HTN, ESRD (MWF), IDDM, p/f chest pain c/b hiccups for the last 2 days undergoing ischemic evaluation per cardiology. Pt s/p RRT x 2 for AMS. MICU consulted and accepting for airway monitoring in setting of baclofen toxicity +/- stroke.     NEUROLOGY   #AMS  - patient with multiple RRTs for AMS  - CTH without acute findings   - may be due to baclofen toxicity  - LP negative  - cw HD/CRRT for concern of baclofen toxicity   - MRI showing L talya and L cerebellar stroke  - EEG negative  - extubated 5/9 PM with weak cough    #Pontine and Cerebellar strokes  - MR from 5/6 showing R pontine and cerebellar infarcts  - unclear timeline but may be inciting event for resp failure  - neurology following  - previously treated with Plavix, heparin, ASA for NSTEMI  - MRA head and neck w/o large vessel occlusion    CARDIOVASCULAR   #Angina  - patient admitted with chest pain and noted to have peaked T waves on admission   - TTE showing EF 72%  - EKG 5/3 demonstrating ST deviation noted on EKG  - Per cardiology, no plan for cath at this time  - s/p DAPT load 5/4  - will c/w ASA 81 mg daily with heparin gtt x 48 hours (now complete)  - repeat TTE (5/4) demonstrating no significant interval changes  - asymptomatic troponin rise from 70 to 87 iso ESRD; trend to peak    #HTN  - will hold bp meds in the setting of hypotension    RESPIRATORY   - intubated due to inability to protect airway  - extubated 5/9 c/b weak cough with desatting improved with deep nasal suction and chest PT     GI/NUTRITION   #Gastroporesis   - hold reglan for now   - will need post extubation bedside swallow eval, possible formal speech and swallow    /RENAL   #ESRD MWF  - R fem shiley removed 5/2  - placement of IJ shiley 5/3  - pending fistula study of RUE (failed previous HD session)  - tolerating HD plan for HD today after MRA      INFECTIOUS DISEASE   #leukocytosis  - noted on repeat labs  - follow up infectious workup  - 5 day course of zosyn (5/3 - 5/8)    ENDOCRINE   #IDD  - NPH 9u q6hrs (hold when NPO)  - cw ISS    HEMATOLOGY/ONCOLOGY  FELIX      DVT PPX  heparin SQ    ETHICS  Full code

## 2024-05-10 NOTE — PROGRESS NOTE ADULT - SUBJECTIVE AND OBJECTIVE BOX
Patient is a 73y old  Male who presents with a chief complaint of Unstable angina, abn EKG (10 May 2024 15:45)      SUBJECTIVE / OVERNIGHT EVENTS: s/p extubation, post extubation required deep suction, now on HFNC, Tmax 101.4, getting HD, s/p a single 500 mg dose of Meropenem now on IV VAnco. ID notified.    MEDICATIONS  (STANDING):  aspirin  chewable 81 milliGRAM(s) Oral daily  chlorhexidine 2% Cloths 1 Application(s) Topical <User Schedule>  dextrose 10% Bolus 125 milliLiter(s) IV Bolus once  dextrose 5%. 1000 milliLiter(s) (100 mL/Hr) IV Continuous <Continuous>  dextrose 5%. 1000 milliLiter(s) (50 mL/Hr) IV Continuous <Continuous>  dextrose 50% Injectable 25 Gram(s) IV Push once  dextrose 50% Injectable 12.5 Gram(s) IV Push once  epoetin reilly (EPOGEN) Injectable 36058 Unit(s) IV Push <User Schedule>  glucagon  Injectable 1 milliGRAM(s) IntraMuscular once  heparin   Injectable 5000 Unit(s) SubCutaneous every 12 hours  insulin lispro (ADMELOG) corrective regimen sliding scale   SubCutaneous every 6 hours  vancomycin  IVPB 1000 milliGRAM(s) IV Intermittent once    MEDICATIONS  (PRN):  dextrose Oral Gel 15 Gram(s) Oral once PRN Blood Glucose LESS THAN 70 milliGRAM(s)/deciliter      Vital Signs Last 24 Hrs  T(F): 100 (05-10-24 @ 20:00), Max: 101.4 (05-10-24 @ 04:10)  HR: 83 (05-10-24 @ 20:00) (75 - 132)  BP: 123/60 (05-10-24 @ 20:00) (83/61 - 205/91)  RR: 22 (05-10-24 @ 20:00) (17 - 41)  SpO2: 100% (05-10-24 @ 20:00) (82% - 100%)  Telemetry:   CAPILLARY BLOOD GLUCOSE      POCT Blood Glucose.: 142 mg/dL (10 May 2024 17:32)  POCT Blood Glucose.: 152 mg/dL (10 May 2024 12:33)  POCT Blood Glucose.: 168 mg/dL (10 May 2024 05:30)  POCT Blood Glucose.: 125 mg/dL (09 May 2024 23:16)    I&O's Summary    09 May 2024 07:01  -  10 May 2024 07:00  --------------------------------------------------------  IN: 785 mL / OUT: 2173 mL / NET: -1388 mL    10 May 2024 07:01  -  10 May 2024 20:25  --------------------------------------------------------  IN: 500 mL / OUT: 1000 mL / NET: -500 mL        PHYSICAL EXAM:  GENERAL: NAD, well-developed  HEAD:  Atraumatic, Normocephalic  EYES: EOMI, PERRLA, conjunctiva and sclera clear  NECK: Supple, No JVD  CHEST/LUNG: Clear to auscultation bilaterally; No wheeze  HEART: Regular rate and rhythm; No murmurs, rubs, or gallops  ABDOMEN: Soft, Nontender, Nondistended; Bowel sounds present  EXTREMITIES:  2+ Peripheral Pulses, No clubbing, cyanosis, or edema  PSYCH: AAOx3  NEUROLOGY: non-focal  SKIN: No rashes or lesions    LABS:                        10.7   14.29 )-----------( 244      ( 10 May 2024 00:50 )             32.9     05-10    141  |  99  |  40<H>  ----------------------------<  148<H>  4.5   |  22  |  4.23<H>    Ca    8.6      10 May 2024 00:50  Phos  5.2     05-10  Mg     2.50     05-10    TPro  7.4  /  Alb  3.5  /  TBili  0.5  /  DBili  x   /  AST  34  /  ALT  39  /  AlkPhos  99  05-10    PT/INR - ( 10 May 2024 00:50 )   PT: 12.0 sec;   INR: 1.07 ratio         PTT - ( 09 May 2024 00:50 )  PTT:27.7 sec      Urinalysis Basic - ( 10 May 2024 00:50 )    Color: x / Appearance: x / SG: x / pH: x  Gluc: 148 mg/dL / Ketone: x  / Bili: x / Urobili: x   Blood: x / Protein: x / Nitrite: x   Leuk Esterase: x / RBC: x / WBC x   Sq Epi: x / Non Sq Epi: x / Bacteria: x        RADIOLOGY & ADDITIONAL TESTS:    Imaging Personally Reviewed:    Consultant(s) Notes Reviewed:      Care Discussed with Consultants/Other Providers:

## 2024-05-10 NOTE — PROGRESS NOTE ADULT - ASSESSMENT
73-year-old male past medical history hypertension, ESRD on dialysis Monday Wednesday Friday, diabetes insulin-dependent presents with hiccups patient endorses persistent hiccups for the last 2 days. Nephrology consulted for HD needs.    A/P  ESRD:  Center: [place]  Nephrologist: Dr. Hardwick  Access: R AVF  MWF schedule  Vascular for fistulogram   s/p femoral shiley on 5/1, now removed  s/p placement of IJ shiley 5/3  Stopped CRRT   Restarted IHD  s/p HD 5/7 UF 1778; treatment terminated early due to hypotension   S/P MRA head/neck w/ gadolinium --> Received HD on 5/9 and 5/10.  Will need to dialyze 3 days consecutively--> plan for HD on 5/11.  Spoke to ICU; will UF net even tomorrow.  Consent obtained and placed in ED chart  Renal diet  Monitor BMP    Hyperkalemia  HD as above  Low K diet.  Monitor closely     HTN:  BP fluctuating.  Off pressors and antihypertensives.  Care per ICU.  Monitor BP.    Anemia:  At goal.  SILVA w/ HD  Monitor Hb    CKD-MBD  Check PTH  PO4 suboptimal - consider calcium acetate 667mg TID.  Low PO4 diet.  Monitor Ca, PO4 daily    Hypocalcemia:  In setting of CKD vs. hypoalbuminemia.  Optimize albumin.  Improving.  Monitor Ca.    D/W ICU team and wife at bedside.

## 2024-05-10 NOTE — PROGRESS NOTE ADULT - SUBJECTIVE AND OBJECTIVE BOX
Patient is a 73y Male     Patient is a 73y old  Male who presents with a chief complaint of Unstable angina, abn EKG (09 May 2024 22:59)      HPI:  73-year-old male past medical history hypertension, ESRD on dialysis Monday Wednesday Friday, diabetes insulin-dependent presents with hiccups patient endorses persistent hiccups for the last 2 days. found to have peaked T waves, a new finding. denies dizziness, N/V, denies CP, palps, abd pain (29 Apr 2024 21:15)      PAST MEDICAL & SURGICAL HISTORY:  Diabetes      Benign essential HTN      HLD (hyperlipidemia)      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      ESRD on hemodialysis      Arteriovenous fistula          MEDICATIONS  (STANDING):  aspirin  chewable 81 milliGRAM(s) Oral daily  chlorhexidine 2% Cloths 1 Application(s) Topical <User Schedule>  dextrose 10% Bolus 125 milliLiter(s) IV Bolus once  dextrose 5%. 1000 milliLiter(s) (100 mL/Hr) IV Continuous <Continuous>  dextrose 5%. 1000 milliLiter(s) (50 mL/Hr) IV Continuous <Continuous>  dextrose 50% Injectable 25 Gram(s) IV Push once  dextrose 50% Injectable 12.5 Gram(s) IV Push once  epoetin reilly (EPOGEN) Injectable 48997 Unit(s) IV Push <User Schedule>  glucagon  Injectable 1 milliGRAM(s) IntraMuscular once  heparin   Injectable 5000 Unit(s) SubCutaneous every 12 hours  insulin lispro (ADMELOG) corrective regimen sliding scale   SubCutaneous every 6 hours  insulin NPH human recombinant 9 Unit(s) SubCutaneous every 6 hours  meropenem  IVPB 500 milliGRAM(s) IV Intermittent every 24 hours  nitroglycerin  Infusion 10 MICROgram(s)/Min (3 mL/Hr) IV Continuous <Continuous>      Allergies    No Known Allergies    Intolerances        SOCIAL HISTORY:  Denies ETOh,Smoking,     FAMILY HISTORY:  FHx: diabetes mellitus (Father, Aunt)        REVIEW OF SYSTEMS:    CONSTITUTIONAL: No weakness, fevers or chills  EYES/ENT: No visual changes;  No vertigo or throat pain   NECK: No pain or stiffness  RESPIRATORY: No cough, wheezing, hemoptysis; No shortness of breath  CARDIOVASCULAR: No chest pain or palpitations  GASTROINTESTINAL: No abdominal or epigastric pain. No nausea, vomiting, or hematemesis; No diarrhea or constipation. No melena or hematochezia.  GENITOURINARY: No dysuria, frequency or hematuria  NEUROLOGICAL: No numbness or weakness  SKIN: No itching, burning, rashes, or lesions   All other review of systems is negative unless indicated above.    VITAL:  T(C): , Max: 38.6 (05-10-24 @ 04:10)  T(F): , Max: 101.4 (05-10-24 @ 04:10)  HR: 89 (05-10-24 @ 06:25)  BP: 107/78 (05-10-24 @ 06:25)  BP(mean): 88 (05-10-24 @ 06:00)  RR: 20 (05-10-24 @ 06:25)  SpO2: 99% (05-10-24 @ 06:00)  Wt(kg): --    I and O's:    05-08 @ 07:01  -  05-09 @ 07:00  --------------------------------------------------------  IN: 1175 mL / OUT: 0 mL / NET: 1175 mL    05-09 @ 07:01  -  05-10 @ 07:00  --------------------------------------------------------  IN: 785 mL / OUT: 2173 mL / NET: -1388 mL          PHYSICAL EXAM:    Constitutional: NAD  HEENT: PERRLA,   Neck: No JVD  Respiratory: CTA B/L  Cardiovascular: S1 and S2  Gastrointestinal: BS+, soft, NT/ND  Extremities: No peripheral edema  Neurological: A/O x 3, no focal deficits  Psychiatric: Normal mood, normal affect  : No Abbasi  Skin: No rashes  Access: Not applicable  Back: No CVA tenderness    LABS:                        10.7   14.29 )-----------( 244      ( 10 May 2024 00:50 )             32.9     05-10    141  |  99  |  40<H>  ----------------------------<  148<H>  4.5   |  22  |  4.23<H>    Ca    8.6      10 May 2024 00:50  Phos  5.2     05-10  Mg     2.50     05-10    TPro  7.4  /  Alb  3.5  /  TBili  0.5  /  DBili  x   /  AST  34  /  ALT  39  /  AlkPhos  99  05-10          RADIOLOGY & ADDITIONAL STUDIES:

## 2024-05-10 NOTE — PROGRESS NOTE ADULT - ASSESSMENT
73-year-old male past medical history hypertension, ESRD on dialysis Monday Wednesday Friday, diabetes insulin-dependent presents with hiccups patient endorses persistent hiccups for the last 2 days.   found to have peaked T waves, a new finding. denied dizziness, N/V, denies CP, palps, abd pain  Upon admission seen by CArd, renal and GI  found to have a distended GB prob 2/2 gastroparesis, was started on Reglan  has received 4 doses of baclofen since 4/30 2/2 hiccups, last dose on 5/1 at 5 am  had received Haldol for agitation at 11 pm on 4/30, AMS observed in pm of 5/1  AMS ongoing, RRT x 2 called  now transferred to MICU for encephalopathy requiring  airway protection on 5/2,  intubated for airway protection, was on  propofol and pressors. now off   HD was not done timely until femoral shiley was placed 2/2 clotted RUE AVF. now removed and RIJ shiley placed on 5/3  has been nonverbal since pm 5/1.     MR brain 5/6 c/w L pontine and L cerebellar acute infarcts, no hemorrhage . as per neuro: embolic in nature.   encephalopathy probably 2/2 acute CVA, now follows commands appropriately off sedation  no SZ focus on EEG,   off CRRT,  HD resumed as per renal.   remains intubated,   off keppra  off AC, off pressors, off CRRT, on HD as per renal,   completed 5 days of empiric ZOSYN on 5/7  toxicology consulted upon dx of encephalopathy, not impressed AMS 2/2 Haldol nor baclofen . AMS was 2/2 acute CVA   afebrile, off pressors, shock resolved  remains intubated for airway protection,   leukocytosis resolved  EKG changes on 5/3 c/w NSTEMI loaded w DAPT , was  on Heparin drip x 48 hrs. rpt TTE is unchanged  ID, Neuro , renal, cardiology following.  esrd, had missed HD prior to AMS 2/2 clotted RUE AVF. fistulogram when medically stable. vascular following   HD via RIght IJ Shiley.   NGT in place in stomach.   LP done, no sign of meningitis.         NEUROLOGY     - CTH without acute findings   - LP done, no acute findings  - MR brain w acute infarcts: L talya and L cerebellum, nonhemorrhagic  - no Sz focus on EEG    CARDIOVASCULAR     - ptn never had CP. just peaked T waves. was awaiting ischemic study w nucl stress test  - TTE showing EF 72%, rpt unchanged  - EKG changes on 5/3, loaded w DAPT now on Heparin drip      GI  - on NGT feeds while sedated, intubated  - Gastroparesis       RENAL   - s/p CRRT in MICU, now off, HD as per renal  - via R IJ kolby. R fem removed  - scheduled for fistula study of RUE  on 5/16 w Dr. Lockett      INFECTIOUS DISEASE   - sepsis resolved, now febrile post extubation   - Tmax 101.4F  - 5/9: s/p extubation, post extubation required deep suction, now on HFNC, Tmax 101.4, getting HD, s/p a single 500 mg dose of Meropenem now on IV VAnco. ID notified.  - pan scan  of C/A/P on admission was neg  -  blood cx NTD  - s/p 5 days pf empiric  ZOsyn    ENDOCRINE   - DM  - cw ISS    GOC:  Full code

## 2024-05-10 NOTE — PROGRESS NOTE ADULT - SUBJECTIVE AND OBJECTIVE BOX
[facility]   MD ELIANE BHAGAT MD ANGELA WONG, PA QIAN CHEN, NP    TEL:  OFFICE: [phone]  From 5pm-7am Answering Service [phone]    -- RENAL FOLLOW UP NOTE ---Date of Service 05-10-24 @ 15:45    Patient is a 73y old  Male who presents with a chief complaint of Unstable angina, abn EKG.  Patient seen and examined at bedside, in ICU. No chest pain/SOB.    VITALS:  T(F): 99.6 (05-10-24 @ 12:00), Max: 101.4 (05-10-24 @ 04:10)  HR: 89 (05-10-24 @ 14:00)  BP: 110/67 (05-10-24 @ 14:00)  RR: 28 (05-10-24 @ 14:00)  SpO2: 96% (05-10-24 @ 14:00)  Wt(kg): --    05-09 @ 07:01  -  05-10 @ 07:00  --------------------------------------------------------  IN: 785 mL / OUT: 2173 mL / NET: -1388 mL    05-10 @ 07:01  -  05-10 @ 15:45  --------------------------------------------------------  IN: 500 mL / OUT: 1000 mL / NET: -500 mL      PHYSICAL EXAM:  General: NAD  Neck: No JVD  Respiratory: CTAB, no wheezes, rales or rhonchi  Cardiovascular: S1, S2, RRR  Gastrointestinal: BS+, soft, NT/ND  Extremities: No peripheral edema.    Hospital Medications:   MEDICATIONS  (STANDING):  aspirin  chewable 81 milliGRAM(s) Oral daily  chlorhexidine 2% Cloths 1 Application(s) Topical <User Schedule>  dextrose 10% Bolus 125 milliLiter(s) IV Bolus once  dextrose 5%. 1000 milliLiter(s) (100 mL/Hr) IV Continuous <Continuous>  dextrose 5%. 1000 milliLiter(s) (50 mL/Hr) IV Continuous <Continuous>  dextrose 50% Injectable 25 Gram(s) IV Push once  dextrose 50% Injectable 12.5 Gram(s) IV Push once  epoetin reilly (EPOGEN) Injectable 78335 Unit(s) IV Push <User Schedule>  glucagon  Injectable 1 milliGRAM(s) IntraMuscular once  heparin   Injectable 5000 Unit(s) SubCutaneous every 12 hours  insulin lispro (ADMELOG) corrective regimen sliding scale   SubCutaneous every 6 hours      LABS:  05-10    141  |  99  |  40<H>  ----------------------------<  148<H>  4.5   |  22  |  4.23<H>    Ca    8.6      10 May 2024 00:50  Phos  5.2     05-10  Mg     2.50     05-10    TPro  7.4  /  Alb  3.5  /  TBili  0.5  /  DBili      /  AST  34  /  ALT  39  /  AlkPhos  99  05-10    Creatinine Trend: 4.23 <--, 3.99 <--, 5.81 <--, 3.94 <--, 3.77 <--, 1.83 <--, 2.06 <--, 2.39 <--, 2.95 <--, 3.47 <--, 4.38 <--, 5.30 <--, 6.47 <--, 7.57 <--, 9.22 <--    Albumin: 3.5 g/dL (05-10 @ 00:50)  Phosphorus: 5.2 mg/dL (05-10 @ 00:50)  Albumin: 3.4 g/dL (05-09 @ 23:57)  Phosphorus: 4.6 mg/dL (05-09 @ 23:57)                          10.7   14.29 )-----------( 244      ( 10 May 2024 00:50 )             32.9     Urine Studies:  Urinalysis - [05-10-24 @ 00:50]      Color  / Appearance  / SG  / pH       Gluc 148 / Ketone   / Bili  / Urobili        Blood  / Protein  / Leuk Est  / Nitrite       RBC  / WBC  / Hyaline  / Gran  / Sq Epi  / Non Sq Epi  / Bacteria       Iron 47, TIBC --, %sat --      [05-01-24 @ 06:44]  TSH 2.79      [04-30-24 @ 06:03]    HBsAb <3.0      [05-01-24 @ 14:42]  HBcAb Nonreact      [05-01-24 @ 14:42]

## 2024-05-10 NOTE — PROGRESS NOTE ADULT - SUBJECTIVE AND OBJECTIVE BOX
Progress Note    JIMMY Glasgow MD 73y (1950) Male [id number]  04-29-24 (11d)      Chief Complaint: Unstable angina, abn EKG    Subjective:  No acute events overnight. Patient seen and examined at bedside.    Review of Systems:  CONSTITUTIONAL: No fever, weight loss, or fatigue  EYES: No eye pain, visual disturbances, or discharge  ENMT:  No difficulty hearing, tinnitus, vertigo; No sinus or throat pain  NECK: No pain or stiffness  RESPIRATORY: No cough, wheezing, chills or hemoptysis; No shortness of breath  CARDIOVASCULAR: No chest pain, palpitations, dizziness, or leg swelling  GASTROINTESTINAL: No abdominal or epigastric pain. No nausea, vomiting, or hematemesis; No diarrhea or constipation. No melena or hematochezia.  GENITOURINARY: No dysuria, frequency, hematuria, or incontinence  NEUROLOGICAL: No headaches, memory loss, loss of strength, numbness, or tremors  SKIN: No itching, burning, rashes, or lesions   MUSCULOSKELETAL: No joint pain or swelling; No muscle, back, or extremity pain      PAST MEDICAL & SURGICAL HISTORY:  Diabetes [250.00]    Stage 5 chronic kidney disease not on chronic dialysis [N18.5]    Benign essential HTN [I10]    HLD (hyperlipidemia) [E78.5]    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 [N18.6]    ESRD on hemodialysis [N18.6]    No significant past surgical history [156858597]    AVF (arteriovenous fistula) [I77.0]    Refined to: Arteriovenous fistula    Arteriovenous fistula [I77.0]      aspirin  chewable 81 milliGRAM(s) Oral daily  chlorhexidine 2% Cloths 1 Application(s) Topical <User Schedule>  dextrose 10% Bolus 125 milliLiter(s) IV Bolus once  dextrose 5%. 1000 milliLiter(s) IV Continuous <Continuous>  dextrose 5%. 1000 milliLiter(s) IV Continuous <Continuous>  dextrose 50% Injectable 25 Gram(s) IV Push once  dextrose 50% Injectable 12.5 Gram(s) IV Push once  dextrose Oral Gel 15 Gram(s) Oral once PRN  epoetin reilly (EPOGEN) Injectable 90229 Unit(s) IV Push <User Schedule>  glucagon  Injectable 1 milliGRAM(s) IntraMuscular once  heparin   Injectable 5000 Unit(s) SubCutaneous every 12 hours  insulin lispro (ADMELOG) corrective regimen sliding scale   SubCutaneous every 6 hours  insulin NPH human recombinant 9 Unit(s) SubCutaneous every 6 hours  meropenem  IVPB 500 milliGRAM(s) IV Intermittent every 24 hours  nitroglycerin  Infusion 10 MICROgram(s)/Min IV Continuous <Continuous>    Objective:  T(C): 37.1 (05-10-24 @ 06:25), Max: 38.6 (05-10-24 @ 04:10)  HR: 105 (05-10-24 @ 07:00) (57 - 132)  BP: 176/23 (05-10-24 @ 07:00) (83/61 - 205/91)  RR: 32 (05-10-24 @ 07:00) (10 - 41)  SpO2: 91% (05-10-24 @ 07:00) (89% - 100%)    Physical exam:  GENERAL: NAD, well-developed  HEAD:  Atraumatic, Normocephalic  EYES: EOMI, PERRLA, conjunctiva and sclera clear  NECK: Supple, No JVD  CHEST/LUNG: Clear to auscultation bilaterally; No wheeze  HEART: Regular rate and rhythm; No murmurs, rubs, or gallops  ABDOMEN: Soft, Nontender, Nondistended; Bowel sounds present  EXTREMITIES:  2+ Peripheral Pulses, No clubbing, cyanosis, or edema  PSYCH: AAOx3  NEUROLOGY: non-focal  SKIN: No rashes or lesions      05-09-24 @ 07:01  -  05-10-24 @ 07:00  --------------------------------------------------------  IN: 785 mL / OUT: 2173 mL / NET: -1388 mL        CAPILLARY BLOOD GLUCOSE      (05-10 @ 00:50)                      10.7  14.29 )-----------( 244                 32.9    Neutrophils = 11.79 (82.5%)  Lymphocytes = 1.16 (8.1%)  Eosinophils = 0.30 (2.1%)  Basophils = 0.06 (0.4%)  Monocytes = 0.88 (6.2%)  Bands = --%    05-10    141  |  99  |  40<H>  ----------------------------<  148<H>  4.5   |  22  |  4.23<H>    Ca    8.6      10 May 2024 00:50  Phos  5.2     05-10  Mg     2.50     05-10    TPro  7.4  /  Alb  3.5  /  TBili  0.5  /  DBili  x   /  AST  34  /  ALT  39  /  AlkPhos  99  05-10    ( 10 May 2024 00:50 )   PT: 12.0 sec;   INR: 1.07 ratio;       PTT:27.7 sec      RVP:    Venous Blood Gas:  05-10 @ 00:50  7.31/50/54/25/78.8  VBG Lactate: 2.0    Arterial Blood Gas:  05-10 @ 02:30  7.34/41/69/22/92.0/-3.5  ABG lactate: --  Arterial Blood Gas:  05-09 @ 23:57  7.39/41/73/25/93.6/-0.2  ABG lactate: --      Tox:         Urinalysis Basic - ( 10 May 2024 00:50 )    Color: x / Appearance: x / SG: x / pH: x  Gluc: 148 mg/dL / Ketone: x  / Bili: x / Urobili: x   Blood: x / Protein: x / Nitrite: x   Leuk Esterase: x / RBC: x / WBC x   Sq Epi: x / Non Sq Epi: x / Bacteria: x        WBC Trend: 14.29<--, 12.78<--, 9.43<--    Hb Trend: 10.7<--, 10.7<--, 9.2<--, 9.6<--, 9.6<--        New imaging in last 24 hours:  Consult notes reviewed: Progress Note    JIMMY Glasgow MD 73y (1950) Male [id number]  04-29-24 (11d)      Chief Complaint: Unstable angina, abn EKG    Subjective:  Extubated around 2230 last night.   Having very weak cough and some desating improved after deep nasal suctioning.   Desating again in the AM improved with chest PT    Review of Systems:  CONSTITUTIONAL: No fever  EYES: No eye pain  ENMT:  No difficulty hearing, tinnitus, vertigo  NECK: No pain or stiffness  RESPIRATORY: weak cough  CARDIOVASCULAR: No chest pain  GASTROINTESTINAL: No abdominal pain. No constipation  GENITOURINARY: No dysuria, frequency, hematuria, or incontinence  NEUROLOGICAL: No headaches, memory loss, loss of strength, numbness, or tremors  SKIN: No itching, burning, rashes, or lesions   MUSCULOSKELETAL: No joint pain or swelling      PAST MEDICAL & SURGICAL HISTORY:  Diabetes [250.00]    Stage 5 chronic kidney disease not on chronic dialysis [N18.5]    Benign essential HTN [I10]    HLD (hyperlipidemia) [E78.5]    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 [N18.6]    ESRD on hemodialysis [N18.6]    No significant past surgical history [752452670]    AVF (arteriovenous fistula) [I77.0]    Refined to: Arteriovenous fistula    Arteriovenous fistula [I77.0]      aspirin  chewable 81 milliGRAM(s) Oral daily  chlorhexidine 2% Cloths 1 Application(s) Topical <User Schedule>  dextrose 10% Bolus 125 milliLiter(s) IV Bolus once  dextrose 5%. 1000 milliLiter(s) IV Continuous <Continuous>  dextrose 5%. 1000 milliLiter(s) IV Continuous <Continuous>  dextrose 50% Injectable 25 Gram(s) IV Push once  dextrose 50% Injectable 12.5 Gram(s) IV Push once  dextrose Oral Gel 15 Gram(s) Oral once PRN  epoetin reilly (EPOGEN) Injectable 55761 Unit(s) IV Push <User Schedule>  glucagon  Injectable 1 milliGRAM(s) IntraMuscular once  heparin   Injectable 5000 Unit(s) SubCutaneous every 12 hours  insulin lispro (ADMELOG) corrective regimen sliding scale   SubCutaneous every 6 hours  insulin NPH human recombinant 9 Unit(s) SubCutaneous every 6 hours  meropenem  IVPB 500 milliGRAM(s) IV Intermittent every 24 hours  nitroglycerin  Infusion 10 MICROgram(s)/Min IV Continuous <Continuous>    Objective:  T(C): 37.1 (05-10-24 @ 06:25), Max: 38.6 (05-10-24 @ 04:10)  HR: 105 (05-10-24 @ 07:00) (57 - 132)  BP: 176/23 (05-10-24 @ 07:00) (83/61 - 205/91)  RR: 32 (05-10-24 @ 07:00) (10 - 41)  SpO2: 91% (05-10-24 @ 07:00) (89% - 100%)    Physical exam:  GENERAL: NAD, cachetic  HEAD:  Atraumatic, Normocephalic  EYES: EOMI, PERRLA, conjunctiva and sclera clear  NECK: Supple, No JVD  CHEST/LUNG: No wheeze. very weak to absent cough. increased secretions with deep nasal suctioning  HEART: Regular rate and rhythm; No murmurs, rubs, or gallops  ABDOMEN: Soft, Nontender, Nondistended; Bowel sounds present  EXTREMITIES:  2+ Peripheral Pulses, No clubbing, cyanosis, or edema  PSYCH: AAOx3  NEUROLOGY: non-focal  SKIN: No rashes or lesions      05-09-24 @ 07:01  -  05-10-24 @ 07:00  --------------------------------------------------------  IN: 785 mL / OUT: 2173 mL / NET: -1388 mL        CAPILLARY BLOOD GLUCOSE      (05-10 @ 00:50)                      10.7  14.29 )-----------( 244                 32.9    Neutrophils = 11.79 (82.5%)  Lymphocytes = 1.16 (8.1%)  Eosinophils = 0.30 (2.1%)  Basophils = 0.06 (0.4%)  Monocytes = 0.88 (6.2%)  Bands = --%    05-10    141  |  99  |  40<H>  ----------------------------<  148<H>  4.5   |  22  |  4.23<H>    Ca    8.6      10 May 2024 00:50  Phos  5.2     05-10  Mg     2.50     05-10    TPro  7.4  /  Alb  3.5  /  TBili  0.5  /  DBili  x   /  AST  34  /  ALT  39  /  AlkPhos  99  05-10    ( 10 May 2024 00:50 )   PT: 12.0 sec;   INR: 1.07 ratio;       PTT:27.7 sec      RVP:    Venous Blood Gas:  05-10 @ 00:50  7.31/50/54/25/78.8  VBG Lactate: 2.0    Arterial Blood Gas:  05-10 @ 02:30  7.34/41/69/22/92.0/-3.5  ABG lactate: --  Arterial Blood Gas:  05-09 @ 23:57  7.39/41/73/25/93.6/-0.2  ABG lactate: --      Tox:         Urinalysis Basic - ( 10 May 2024 00:50 )    Color: x / Appearance: x / SG: x / pH: x  Gluc: 148 mg/dL / Ketone: x  / Bili: x / Urobili: x   Blood: x / Protein: x / Nitrite: x   Leuk Esterase: x / RBC: x / WBC x   Sq Epi: x / Non Sq Epi: x / Bacteria: x        WBC Trend: 14.29<--, 12.78<--, 9.43<--    Hb Trend: 10.7<--, 10.7<--, 9.2<--, 9.6<--, 9.6<--        New imaging in last 24 hours:  Consult notes reviewed:

## 2024-05-11 LAB
ALBUMIN SERPL ELPH-MCNC: 3.1 G/DL — LOW (ref 3.3–5)
ALP SERPL-CCNC: 96 U/L — SIGNIFICANT CHANGE UP (ref 40–120)
ALT FLD-CCNC: 26 U/L — SIGNIFICANT CHANGE UP (ref 4–41)
ANION GAP SERPL CALC-SCNC: 28 MMOL/L — HIGH (ref 7–14)
APTT BLD: 31.4 SEC — SIGNIFICANT CHANGE UP (ref 24.5–35.6)
AST SERPL-CCNC: 16 U/L — SIGNIFICANT CHANGE UP (ref 4–40)
BASOPHILS # BLD AUTO: 0 K/UL — SIGNIFICANT CHANGE UP (ref 0–0.2)
BASOPHILS NFR BLD AUTO: 0 % — SIGNIFICANT CHANGE UP (ref 0–2)
BILIRUB SERPL-MCNC: 0.4 MG/DL — SIGNIFICANT CHANGE UP (ref 0.2–1.2)
BLOOD GAS VENOUS COMPREHENSIVE RESULT: SIGNIFICANT CHANGE UP
BUN SERPL-MCNC: 45 MG/DL — HIGH (ref 7–23)
CA-I BLD-SCNC: 1.07 MMOL/L — LOW (ref 1.15–1.29)
CALCIUM SERPL-MCNC: 8.6 MG/DL — SIGNIFICANT CHANGE UP (ref 8.4–10.5)
CHLORIDE SERPL-SCNC: 97 MMOL/L — LOW (ref 98–107)
CO2 SERPL-SCNC: 19 MMOL/L — LOW (ref 22–31)
CREAT SERPL-MCNC: 4.24 MG/DL — HIGH (ref 0.5–1.3)
CULTURE RESULTS: ABNORMAL
EGFR: 14 ML/MIN/1.73M2 — LOW
EOSINOPHIL # BLD AUTO: 0 K/UL — SIGNIFICANT CHANGE UP (ref 0–0.5)
EOSINOPHIL NFR BLD AUTO: 0 % — SIGNIFICANT CHANGE UP (ref 0–6)
GLUCOSE BLDC GLUCOMTR-MCNC: 152 MG/DL — HIGH (ref 70–99)
GLUCOSE BLDC GLUCOMTR-MCNC: 238 MG/DL — HIGH (ref 70–99)
GLUCOSE BLDC GLUCOMTR-MCNC: 288 MG/DL — HIGH (ref 70–99)
GLUCOSE SERPL-MCNC: 164 MG/DL — HIGH (ref 70–99)
HCT VFR BLD CALC: 33 % — LOW (ref 39–50)
HGB BLD-MCNC: 10.3 G/DL — LOW (ref 13–17)
IANC: 25.36 K/UL — HIGH (ref 1.8–7.4)
INR BLD: 1.16 RATIO — SIGNIFICANT CHANGE UP (ref 0.85–1.18)
LYMPHOCYTES # BLD AUTO: 0.46 K/UL — LOW (ref 1–3.3)
LYMPHOCYTES # BLD AUTO: 1.7 % — LOW (ref 13–44)
MAGNESIUM SERPL-MCNC: 2.5 MG/DL — SIGNIFICANT CHANGE UP (ref 1.6–2.6)
MCHC RBC-ENTMCNC: 20.2 PG — LOW (ref 27–34)
MCHC RBC-ENTMCNC: 31.2 GM/DL — LOW (ref 32–36)
MCV RBC AUTO: 64.6 FL — LOW (ref 80–100)
MONOCYTES # BLD AUTO: 0.46 K/UL — SIGNIFICANT CHANGE UP (ref 0–0.9)
MONOCYTES NFR BLD AUTO: 1.7 % — LOW (ref 2–14)
NEUTROPHILS # BLD AUTO: 25.92 K/UL — HIGH (ref 1.8–7.4)
NEUTROPHILS NFR BLD AUTO: 94.9 % — HIGH (ref 43–77)
ORGANISM # SPEC MICROSCOPIC CNT: ABNORMAL
ORGANISM # SPEC MICROSCOPIC CNT: ABNORMAL
PHOSPHATE SERPL-MCNC: 6.3 MG/DL — HIGH (ref 2.5–4.5)
PLATELET # BLD AUTO: 254 K/UL — SIGNIFICANT CHANGE UP (ref 150–400)
POTASSIUM SERPL-MCNC: 4.7 MMOL/L — SIGNIFICANT CHANGE UP (ref 3.5–5.3)
POTASSIUM SERPL-SCNC: 4.7 MMOL/L — SIGNIFICANT CHANGE UP (ref 3.5–5.3)
PROT SERPL-MCNC: 6.6 G/DL — SIGNIFICANT CHANGE UP (ref 6–8.3)
PROTHROM AB SERPL-ACNC: 13.1 SEC — HIGH (ref 9.5–13)
RBC # BLD: 5.11 M/UL — SIGNIFICANT CHANGE UP (ref 4.2–5.8)
RBC # FLD: 20.5 % — HIGH (ref 10.3–14.5)
SODIUM SERPL-SCNC: 144 MMOL/L — SIGNIFICANT CHANGE UP (ref 135–145)
SPECIMEN SOURCE: SIGNIFICANT CHANGE UP
WBC # BLD: 27.31 K/UL — HIGH (ref 3.8–10.5)
WBC # FLD AUTO: 27.31 K/UL — HIGH (ref 3.8–10.5)

## 2024-05-11 PROCEDURE — 99291 CRITICAL CARE FIRST HOUR: CPT | Mod: GC

## 2024-05-11 RX ORDER — VANCOMYCIN HCL 1 G
1250 VIAL (EA) INTRAVENOUS ONCE
Refills: 0 | Status: COMPLETED | OUTPATIENT
Start: 2024-05-11 | End: 2024-05-11

## 2024-05-11 RX ORDER — MEROPENEM 1 G/30ML
500 INJECTION INTRAVENOUS EVERY 24 HOURS
Refills: 0 | Status: DISCONTINUED | OUTPATIENT
Start: 2024-05-11 | End: 2024-05-15

## 2024-05-11 RX ADMIN — Medication 6: at 17:26

## 2024-05-11 RX ADMIN — Medication 2: at 05:03

## 2024-05-11 RX ADMIN — HEPARIN SODIUM 5000 UNIT(S): 5000 INJECTION INTRAVENOUS; SUBCUTANEOUS at 05:03

## 2024-05-11 RX ADMIN — Medication 4: at 13:03

## 2024-05-11 RX ADMIN — MEROPENEM 100 MILLIGRAM(S): 1 INJECTION INTRAVENOUS at 21:20

## 2024-05-11 RX ADMIN — HEPARIN SODIUM 5000 UNIT(S): 5000 INJECTION INTRAVENOUS; SUBCUTANEOUS at 17:29

## 2024-05-11 RX ADMIN — CHLORHEXIDINE GLUCONATE 1 APPLICATION(S): 213 SOLUTION TOPICAL at 05:04

## 2024-05-11 RX ADMIN — Medication 166.67 MILLIGRAM(S): at 14:58

## 2024-05-11 NOTE — PROGRESS NOTE ADULT - ASSESSMENT
74 y/o M PMhx HTN, ESRD (on HD M/W/F), DM who presented with hiccups x 2 days and chest pain found to have peaked T waves. Hospital course c/b AMS s/p RRT on 5/1 and 5/2. Vascular consulted 2/2 RUE AVF access unable to be used s/p R femoral shiley placed for emergent HD. Transferred to MICU and intubated 5/2 for airway protection.     RECOMMENDATIONS  1-FEVER and Leukocytosis and positive blood culture  CXR-5/10-opac RT lung base, 27.31 05-11 @ 00:20, fevers noted since 5/10,   concerns for untreated infection but localization PNA vs intra-abdominal unclear   (05.10.24 @ 03:45) Blood culture with Growth in anaerobic bottle: Bacillus cereus  -Most B. cereus isolates produce beta-lactamases so penicillins and cephalosporins should not be used for empiric treatment of Bacillus spp infection, vancomycin is generally considered the preferred antibiotic choice pending sensitivities but with possible abd source if consistent with GOC would recommend  Vancomycin IV and Meropenem (recent Zosyn).   repeat blood cultures, GI-PCR, kayden culture and consider abd/pelvic imaging    2-AMS, CVA  TTE- no evidence of valvular disease  s/p LP 5/2, cell count not consistent w/ bacterial meningitis, CSF PCR- negative  MRI- Punctate foci of restricted diffusion in the left talya and left cerebellum with associated T2 prolongation consistent with acute infarcts  s/p empiric zosyn x 5 days, completed 5/7    3-ESRD  tentative plan for RUE fistulogram when medically optimized    Thank you for consulting us and involving us in the management of this most interesting and challenging case.  We will follow along in the care of this patient. Please call us at [phone] or text me directly on my cell# at [phone] with any concerns.    Starting Monday Dr Steven Torres will be covering this patient so please contact him with further questions office [phone] or our answering service at [phone]

## 2024-05-11 NOTE — PROGRESS NOTE ADULT - ASSESSMENT
73M w/ PMHx hypertension, ESRD on HD via R radiocephalic fistula (MWF), DM, HTN, p/w hiccups and peaked T waves, admitted to MICU for c/f baclofen toxicity. Patient received 1x HD on admission. R femoral shiley removed 5/2, had 2 RRT for AMS and failed HD via AVF 5/3. S/p emergent R IJ shiley placement 5/3, started CRRT which is now transitioned back to iHD. Vascular planning RUE fistulogram when medically optimized. Extubated to [facility].    Recommendations:   - Patient w/ rising leukocytosis - f/u fever/infectious workup, possible aspiration on extubation?  - Fistulogram may need to be postponed given leukocytosis and concern for active infection  - Continue HD via R IJ shiley  - Global care per primary     Vascular Surgery  [id number]

## 2024-05-11 NOTE — PROGRESS NOTE ADULT - SUBJECTIVE AND OBJECTIVE BOX
Patient is a 73y Male     Patient is a 73y old  Male who presents with a chief complaint of Unstable angina, abn EKG (11 May 2024 09:13)      HPI:  73-year-old male past medical history hypertension, ESRD on dialysis Monday Wednesday Friday, diabetes insulin-dependent presents with hiccups patient endorses persistent hiccups for the last 2 days. found to have peaked T waves, a new finding. denies dizziness, N/V, denies CP, palps, abd pain (29 Apr 2024 21:15)      PAST MEDICAL & SURGICAL HISTORY:  Diabetes      Benign essential HTN      HLD (hyperlipidemia)      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      ESRD on hemodialysis      Arteriovenous fistula          MEDICATIONS  (STANDING):  aspirin  chewable 81 milliGRAM(s) Oral daily  chlorhexidine 2% Cloths 1 Application(s) Topical <User Schedule>  dextrose 10% Bolus 125 milliLiter(s) IV Bolus once  dextrose 5%. 1000 milliLiter(s) (100 mL/Hr) IV Continuous <Continuous>  dextrose 5%. 1000 milliLiter(s) (50 mL/Hr) IV Continuous <Continuous>  dextrose 50% Injectable 25 Gram(s) IV Push once  dextrose 50% Injectable 12.5 Gram(s) IV Push once  epoetin reilly (EPOGEN) Injectable 36405 Unit(s) IV Push <User Schedule>  glucagon  Injectable 1 milliGRAM(s) IntraMuscular once  heparin   Injectable 5000 Unit(s) SubCutaneous every 12 hours  insulin lispro (ADMELOG) corrective regimen sliding scale   SubCutaneous every 6 hours      Allergies    No Known Allergies    Intolerances        SOCIAL HISTORY:  Denies ETOh,Smoking,     FAMILY HISTORY:  FHx: diabetes mellitus (Father, Aunt)        REVIEW OF SYSTEMS:    CONSTITUTIONAL: No weakness, fevers or chills  EYES/ENT: No visual changes;  No vertigo or throat pain   NECK: No pain or stiffness  RESPIRATORY: No cough, wheezing, hemoptysis; No shortness of breath  CARDIOVASCULAR: No chest pain or palpitations  GASTROINTESTINAL: No abdominal or epigastric pain. No nausea, vomiting, or hematemesis; No diarrhea or constipation. No melena or hematochezia.  GENITOURINARY: No dysuria, frequency or hematuria  NEUROLOGICAL: No numbness or weakness  SKIN: No itching, burning, rashes, or lesions   All other review of systems is negative unless indicated above.    VITAL:  T(C): , Max: 38 (05-10-24 @ 16:00)  T(F): , Max: 100.4 (05-10-24 @ 16:00)  HR: 70 (05-11-24 @ 09:18)  BP: 132/76 (05-11-24 @ 09:00)  BP(mean): 90 (05-11-24 @ 09:00)  RR: 26 (05-11-24 @ 09:18)  SpO2: 100% (05-11-24 @ 09:18)  Wt(kg): --    I and O's:    05-09 @ 07:01  -  05-10 @ 07:00  --------------------------------------------------------  IN: 785 mL / OUT: 2173 mL / NET: -1388 mL    05-10 @ 07:01  -  05-11 @ 07:00  --------------------------------------------------------  IN: 500 mL / OUT: 1000 mL / NET: -500 mL          PHYSICAL EXAM:    Constitutional: NAD  HEENT: PERRLA,   Neck: No JVD  Respiratory: CTA B/L  Cardiovascular: S1 and S2  Gastrointestinal: BS+, soft, NT/ND  Extremities: No peripheral edema  Neurological: A/O x 3, no focal deficits  Psychiatric: Normal mood, normal affect  : No Abbasi  Skin: No rashes  Access: Not applicable  Back: No CVA tenderness    LABS:                        10.3   27.31 )-----------( 254      ( 11 May 2024 00:20 )             33.0     05-11    144  |  97<L>  |  45<H>  ----------------------------<  164<H>  4.7   |  19<L>  |  4.24<H>    Ca    8.6      11 May 2024 00:20  Phos  6.3     05-11  Mg     2.50     05-11    TPro  6.6  /  Alb  3.1<L>  /  TBili  0.4  /  DBili  x   /  AST  16  /  ALT  26  /  AlkPhos  96  05-11          RADIOLOGY & ADDITIONAL STUDIES:

## 2024-05-11 NOTE — PROGRESS NOTE ADULT - ASSESSMENT
73M w/ PMH of HTN, ESRD (MWF), IDDM, p/f chest pain c/b hiccups for the last 2 days undergoing ischemic evaluation per cardiology. Pt s/p RRT x 2 for AMS. MICU consulted and accepting for airway monitoring in setting of baclofen toxicity +/- stroke.     NEUROLOGY   #AMS  - patient with multiple RRTs for AMS  - CTH without acute findings   - may be due to baclofen toxicity  - LP negative  - cw HD/CRRT for concern of baclofen toxicity   - MRI showing L talya and L cerebellar stroke  - EEG negative  - extubated 5/9 PM with weak cough    #Pontine and Cerebellar strokes  - MR from 5/6 showing R pontine and cerebellar infarcts  - unclear timeline but may be inciting event for resp failure  - neurology following  - previously treated with Plavix, heparin, ASA for NSTEMI  - MRA head and neck w/o large vessel occlusion    CARDIOVASCULAR   #Angina  - patient admitted with chest pain and noted to have peaked T waves on admission   - TTE showing EF 72%  - EKG 5/3 demonstrating ST deviation noted on EKG  - Per cardiology, no plan for cath at this time  - s/p DAPT load 5/4  - will c/w ASA 81 mg daily with heparin gtt x 48 hours (now complete)  - repeat TTE (5/4) demonstrating no significant interval changes  - asymptomatic troponin rise from 70 to 87 iso ESRD; trend to peak    #HTN  - will hold bp meds in the setting of hypotension    RESPIRATORY   - intubated due to inability to protect airway  - extubated 5/9 c/b weak cough with desatting improved with deep nasal suction and chest PT     GI/NUTRITION   #Gastroporesis   - hold reglan for now   - will need post extubation bedside swallow eval, possible formal speech and swallow    /RENAL   #ESRD MWF  - R fem shiley removed 5/2  - placement of IJ shiley 5/3  - pending fistula study of RUE (failed previous HD session)  - tolerating HD plan for HD today after MRA      INFECTIOUS DISEASE   #leukocytosis  - noted on repeat labs  - follow up infectious workup  - 5 day course of zosyn (5/3 - 5/8)    ENDOCRINE   #IDD  - NPH 9u q6hrs (hold when NPO)  - cw ISS    HEMATOLOGY/ONCOLOGY  FELIX      DVT PPX  heparin SQ    ETHICS  Full code   73M w/ PMH of HTN, ESRD (MWF), IDDM, p/f chest pain c/b hiccups for the last 2 days undergoing ischemic evaluation per cardiology. Pt s/p RRT x 2 for AMS. MICU consulted and accepting for airway monitoring in setting of baclofen toxicity +/- stroke.     NEUROLOGY   #AMS  - patient with multiple RRTs for AMS  - CTH without acute findings   - may be due to baclofen toxicity  - LP negative  - cw HD/CRRT for concern of baclofen toxicity   - MRI showing L talya and L cerebellar stroke  - EEG negative  - extubated 5/9 PM with weak cough  - now responding and writing but no verbal communication    #Pontine and Cerebellar strokes  - MR from 5/6 showing R pontine and cerebellar infarcts  - unclear timeline but may be inciting event for resp failure  - neurology following  - previously treated with Plavix, heparin, ASA for NSTEMI  - MRA head and neck w/o large vessel occlusion    CARDIOVASCULAR   #Angina  - patient admitted with chest pain and noted to have peaked T waves on admission   - TTE showing EF 72%  - EKG 5/3 demonstrating ST deviation noted on EKG  - Per cardiology, no plan for cath at this time  - s/p DAPT load 5/4  - will c/w ASA 81 mg daily with heparin gtt x 48 hours (now complete)  - repeat TTE (5/4) demonstrating no significant interval changes  - asymptomatic troponin iso ESRD; trend to peak    #HTN  - will hold bp meds in the setting of hypotension initially    RESPIRATORY   - intubated due to inability to protect airway  - extubated 5/9 c/b weak cough with desatting improved with deep nasal suction and chest PT     GI/NUTRITION   #Gastroporesis   - hold reglan for now   - will need post extubation bedside swallow eval, possible formal speech and swallow    /RENAL   #ESRD MWF  - R fem shiley removed 5/2  - placement of IJ shiley 5/3 then removed 5/10 iso positive BCx  - pending fistula study of RUE (failed previous HD session)  - tolerating HD  - post MRI JET HD 2/3 completed, last to be completed today after new shiley placed.       INFECTIOUS DISEASE   #leukocytosis  - noted on repeat labs  - follow up infectious workup  - 5 day course of zosyn (5/3 - 5/8)  - now with rising WBC iso fever and positive BCx; also with RLQ abdominal pain  - consider appendicitis vs aspiration iso recent extubation vs bacteremia    ENDOCRINE   #IDD  - NPH 9u q6hrs (hold when NPO)  - cw ISS    HEMATOLOGY/ONCOLOGY  FELIX      DVT PPX  heparin SQ    ETHICS  Full code

## 2024-05-11 NOTE — PROGRESS NOTE ADULT - SUBJECTIVE AND OBJECTIVE BOX
[facility] DIVISION of INFECTIOUS DISEASE  Kenton Fox MD PhD, Bell Wheatley MD, Saray Toribio MD, Delano Ross MD, Steven Torres MD  and providing coverage with Radha Duvall MD  Providing Infectious Disease Consultations at [facility], [facility], [facility], [facility]'s    Office# [phone] to schedule follow up appointments  Answering Service for urgent calls or New Consults [phone]  Cell# to text for urgent issues Kenton Fox [phone]     infectious diseases progress note:    JIMMY BLAND is a 73y y. o. Male patient    Overnight and events of the last 24hrs reviewed    Allergies    No Known Allergies    Intolerances        ANTIBIOTICS/RELEVANT:  antimicrobials    immunologic:  epoetin reilly (EPOGEN) Injectable 71670 Unit(s) IV Push <User Schedule>    OTHER:  aspirin  chewable 81 milliGRAM(s) Oral daily  chlorhexidine 2% Cloths 1 Application(s) Topical <User Schedule>  dextrose 10% Bolus 125 milliLiter(s) IV Bolus once  dextrose 5%. 1000 milliLiter(s) IV Continuous <Continuous>  dextrose 5%. 1000 milliLiter(s) IV Continuous <Continuous>  dextrose 50% Injectable 25 Gram(s) IV Push once  dextrose 50% Injectable 12.5 Gram(s) IV Push once  dextrose Oral Gel 15 Gram(s) Oral once PRN  glucagon  Injectable 1 milliGRAM(s) IntraMuscular once  heparin   Injectable 5000 Unit(s) SubCutaneous every 12 hours  insulin lispro (ADMELOG) corrective regimen sliding scale   SubCutaneous every 6 hours      Objective:  Vital Signs Last 24 Hrs  T(C): 37.4 (11 May 2024 08:00), Max: 38 (10 May 2024 16:00)  T(F): 99.3 (11 May 2024 08:00), Max: 100.4 (10 May 2024 16:00)  HR: 84 (11 May 2024 12:00) (42 - 91)  BP: 192/85 (11 May 2024 12:00) (89/56 - 192/85)  BP(mean): 114 (11 May 2024 12:00) (65 - 114)  RR: 28 (11 May 2024 12:00) (21 - 34)  SpO2: 94% (11 May 2024 12:00) (82% - 100%)    Parameters below as of 11 May 2024 12:00  Patient On (Oxygen Delivery Method): nasal cannula, high flow  O2 Flow (L/min): 40  O2 Concentration (%): 40    T(C): 37.4 (05-11-24 @ 08:00), Max: 38.6 (05-10-24 @ 04:10)  T(C): 37.4 (05-11-24 @ 08:00), Max: 38.6 (05-10-24 @ 04:10)  T(C): 37.4 (05-11-24 @ 08:00), Max: 38.6 (05-10-24 @ 04:10)    PHYSICAL EXAM:  HEENT: NC atraumatic  Neck: supple  Respiratory: no accessory muscle use, breathing comfortably  Cardiovascular: distant  Gastrointestinal: normal appearing, nondistended  Extremities: no clubbing, no cyanosis,        LABS:                          10.3   27.31 )-----------( 254      ( 11 May 2024 00:20 )             33.0       WBC  27.31 05-11 @ 00:20  14.29 05-10 @ 00:50  12.78 05-09 @ 23:57  9.43 05-09 @ 00:50  8.14 05-08 @ 01:33  12.10 05-07 @ 00:30  16.47 05-06 @ 00:00  16.48 05-05 @ 16:30  18.14 05-05 @ 10:15  18.63 05-05 @ 03:10  23.05 05-04 @ 21:00  24.74 05-04 @ 14:55      05-11    144  |  97<L>  |  45<H>  ----------------------------<  164<H>  4.7   |  19<L>  |  4.24<H>    Ca    8.6      11 May 2024 00:20  Phos  6.3     05-11  Mg     2.50     05-11    TPro  6.6  /  Alb  3.1<L>  /  TBili  0.4  /  DBili  x   /  AST  16  /  ALT  26  /  AlkPhos  96  05-11      Creatinine: 4.24 mg/dL (05-11-24 @ 00:20)  Creatinine: 4.23 mg/dL (05-10-24 @ 00:50)  Creatinine: 3.99 mg/dL (05-09-24 @ 23:57)  Creatinine: 5.81 mg/dL (05-09-24 @ 00:50)  Creatinine: 3.94 mg/dL (05-08-24 @ 01:33)  Creatinine: 3.77 mg/dL (05-07-24 @ 00:30)  Creatinine: 1.83 mg/dL (05-06-24 @ 00:00)  Creatinine: 2.06 mg/dL (05-05-24 @ 16:30)  Creatinine: 2.39 mg/dL (05-05-24 @ 10:15)  Creatinine: 2.95 mg/dL (05-05-24 @ 03:10)  Creatinine: 3.47 mg/dL (05-04-24 @ 21:00)  Creatinine: 4.38 mg/dL (05-04-24 @ 14:55)      PT/INR - ( 11 May 2024 00:20 )   PT: 13.1 sec;   INR: 1.16 ratio         PTT - ( 11 May 2024 00:20 )  PTT:31.4 sec  Urinalysis Basic - ( 11 May 2024 00:20 )    Color: x / Appearance: x / SG: x / pH: x  Gluc: 164 mg/dL / Ketone: x  / Bili: x / Urobili: x   Blood: x / Protein: x / Nitrite: x   Leuk Esterase: x / RBC: x / WBC x   Sq Epi: x / Non Sq Epi: x / Bacteria: x            INFLAMMATORY MARKERS      MICROBIOLOGY:    Culture - Blood (05.10.24 @ 03:45)    -  Blood PCR Panel: NEG   Gram Stain:   Growth in anaerobic bottle: Gram Positive Rods   Specimen Source: .Blood Blood-Peripheral   Organism: Blood Culture PCR   Culture Results:   Growth in anaerobic bottle: Bacillus cereus  Direct identification is available within approximately 3-5  hours either by Blood Panel Multiplexed PCR or Direct  MALDI-TOF. Details: [email or web address]   Organism Identification: Blood Culture PCR   Method Type: PCR        RADIOLOGY & ADDITIONAL STUDIES:  < from: Xray Chest 1 View- PORTABLE-Urgent (Xray Chest 1 View- PORTABLE-Urgent .) (05.10.24 @ 10:57) >    ACC: [id number] EXAM:  XR CHEST PORTABLE URGENT 1V   ORDERED BY: DOLORES QUICK     ACC: [id number] EXAM:  XR CHEST PORTABLE URGENT 1V   ORDERED BY: NADIR ARCINIEGA     PROCEDURE DATE:  05/10/2024          INTERPRETATION:  CLINICAL INFORMATION: Hypoxia.    TECHNIQUE: Single portable view of the chest 5/10/2024 at 2:55 AM 10:44   AM.    COMPARISON: Chest x-ray 5/3/2024.    FINDINGS:    2:55 AM:    Interval extubation and removal of enteric tube.  Right IJ hemodialysis tip is in the SVC.  New opacity at the right lung base with suspected elevation of the   hemidiaphragm consistent with lower lobe atelectasis.  No pneumothorax.  Heart size within normal limits.    10:44 AM:    No significant interval change.    IMPRESSION: Opacity at the right lung base suspicious for lower lobe   atelectasis.

## 2024-05-11 NOTE — PROGRESS NOTE ADULT - SUBJECTIVE AND OBJECTIVE BOX
[facility]   MD ELIANE BHAGAT MD MARIA SANTIAGO, NP    TEL:  OFFICE: [phone]  From 5pm-7am Answering Service [phone]    -- RENAL FOLLOW UP NOTE ---Date of Service 05-11-24 @ 15:34    Patient is a 73y old  Male who presents with a chief complaint of Unstable angina, abn EKG       Patient seen and examined at bedside. No chest pain, on high flow nasal cannula    VITALS:  T(F): 99.7 (05-11-24 @ 12:00), Max: 100.4 (05-10-24 @ 16:00)  HR: 82 (05-11-24 @ 15:08)  BP: 142/67 (05-11-24 @ 15:00)  RR: 24 (05-11-24 @ 15:08)  SpO2: 99% (05-11-24 @ 15:08)  Wt(kg): --    05-10 @ 07:01  -  05-11 @ 07:00  --------------------------------------------------------  IN: 500 mL / OUT: 1000 mL / NET: -500 mL    05-11 @ 07:01  -  05-11 @ 15:34  --------------------------------------------------------  IN: 250 mL / OUT: 0 mL / NET: 250 mL          PHYSICAL EXAM:  General: NAD  Neck: No JVD  Respiratory: CTAB, no wheezes, rales or rhonchi; on high flow oxygen  Cardiovascular: S1, S2, RRR  Gastrointestinal: BS+, soft, NT/ND  Extremities: No peripheral edema    Hospital Medications:   MEDICATIONS  (STANDING):  aspirin  chewable 81 milliGRAM(s) Oral daily  chlorhexidine 2% Cloths 1 Application(s) Topical <User Schedule>  dextrose 10% Bolus 125 milliLiter(s) IV Bolus once  dextrose 5%. 1000 milliLiter(s) (100 mL/Hr) IV Continuous <Continuous>  dextrose 5%. 1000 milliLiter(s) (50 mL/Hr) IV Continuous <Continuous>  dextrose 50% Injectable 25 Gram(s) IV Push once  dextrose 50% Injectable 12.5 Gram(s) IV Push once  epoetin reilly (EPOGEN) Injectable 04737 Unit(s) IV Push <User Schedule>  glucagon  Injectable 1 milliGRAM(s) IntraMuscular once  heparin   Injectable 5000 Unit(s) SubCutaneous every 12 hours  insulin lispro (ADMELOG) corrective regimen sliding scale   SubCutaneous every 6 hours  meropenem  IVPB 500 milliGRAM(s) IV Intermittent every 24 hours      LABS:  05-11    144  |  97<L>  |  45<H>  ----------------------------<  164<H>  4.7   |  19<L>  |  4.24<H>    Ca    8.6      11 May 2024 00:20  Phos  6.3     05-11  Mg     2.50     05-11    TPro  6.6  /  Alb  3.1<L>  /  TBili  0.4  /  DBili      /  AST  16  /  ALT  26  /  AlkPhos  96  05-11    Creatinine Trend: 4.24 <--, 4.23 <--, 3.99 <--, 5.81 <--, 3.94 <--, 3.77 <--, 1.83 <--, 2.06 <--, 2.39 <--, 2.95 <--, 3.47 <--    Phosphorus: 6.3 mg/dL (05-11 @ 00:20)  Albumin: 3.1 g/dL (05-11 @ 00:20)                              10.3   27.31 )-----------( 254      ( 11 May 2024 00:20 )             33.0     Urine Studies:  Urinalysis - [05-11-24 @ 00:20]      Color  / Appearance  / SG  / pH       Gluc 164 / Ketone   / Bili  / Urobili        Blood  / Protein  / Leuk Est  / Nitrite       RBC  / WBC  / Hyaline  / Gran  / Sq Epi  / Non Sq Epi  / Bacteria       Iron 47, TIBC --, %sat --      [05-01-24 @ 06:44]  TSH 2.79      [04-30-24 @ 06:03]    HBsAb <3.0      [05-01-24 @ 14:42]  HBcAb Nonreact      [05-01-24 @ 14:42]      RADIOLOGY & ADDITIONAL STUDIES:

## 2024-05-11 NOTE — PROGRESS NOTE ADULT - SUBJECTIVE AND OBJECTIVE BOX
Cardiovascular Disease Progress Note  DATE OF SERVICE: 24 @ 09:14    Overnight events: No acute events overnight.   The patient is in no distress on HFNC.         Objective Findings:  T(C): 37.4 (24 @ 08:00), Max: 38 (05-10-24 @ 16:00)  HR: 75 (24 @ 08:00) (75 - 91)  BP: 145/63 (24 @ 08:00) (89/56 - 146/63)  RR: 24 (24 @ 08:00) (21 - 34)  SpO2: 99% (24 @ 08:00) (82% - 100%)  Wt(kg): --  Daily     Daily Weight in k (11 May 2024 04:00)      Physical Exam:  Gen: NAD; Patient resting comfortably  HEENT:  Normocephalic/ atraumatic  CV: RRR, normal S1 + S2, no m/r/g  Lungs:  Normal respiratory effort; decreased air entry to auscultation bilaterally  Abd: soft, non-tender; bowel sounds present  Ext: No edema; warm and well perfused    Telemetry: Sinus    Laboratory Data:                        10.3   27.31 )-----------( 254      ( 11 May 2024 00:20 )             33.0         144  |  97<L>  |  45<H>  ----------------------------<  164<H>  4.7   |  19<L>  |  4.24<H>    Ca    8.6      11 May 2024 00:20  Phos  6.3       Mg     2.50         TPro  6.6  /  Alb  3.1<L>  /  TBili  0.4  /  DBili  x   /  AST  16  /  ALT  26  /  AlkPhos  96      PT/INR - ( 11 May 2024 00:20 )   PT: 13.1 sec;   INR: 1.16 ratio         PTT - ( 11 May 2024 00:20 )  PTT:31.4 sec          Inpatient Medications:  MEDICATIONS  (STANDING):  aspirin  chewable 81 milliGRAM(s) Oral daily  chlorhexidine 2% Cloths 1 Application(s) Topical <User Schedule>  dextrose 10% Bolus 125 milliLiter(s) IV Bolus once  dextrose 5%. 1000 milliLiter(s) (100 mL/Hr) IV Continuous <Continuous>  dextrose 5%. 1000 milliLiter(s) (50 mL/Hr) IV Continuous <Continuous>  dextrose 50% Injectable 25 Gram(s) IV Push once  dextrose 50% Injectable 12.5 Gram(s) IV Push once  epoetin reilly (EPOGEN) Injectable 54035 Unit(s) IV Push <User Schedule>  glucagon  Injectable 1 milliGRAM(s) IntraMuscular once  heparin   Injectable 5000 Unit(s) SubCutaneous every 12 hours  insulin lispro (ADMELOG) corrective regimen sliding scale   SubCutaneous every 6 hours      Assessment: 73 year old man with HTN, HLD, T2DM on insulin, and ESRD on HD presents with supply demand ischemia and angina.    Plan of Care:    #Type II myocardial infarction-  Secondary to distributive shock earlier on admission. .   ST deviation noted on EKG, however interpretation is limited by LVH with repolarization changes.  The repeat echo shows no new wall motion abnormality.   I would not pursue cath until his mental status is reassessed post extubation.   Medical management of NSTEMI.    Please stop trending troponins, as it will not .   ASA 81 mg daily         #Sinus bradycardia-  No evidence of AV block on admission EKG or telemetry.  No indication for pacing at this time.     #HTN-  Recent shock.  Pressors now off    #ESRD-  CRRT in the MICU.       Patient critically ill in the MICU       Over 55 minutes of critical care time spent on total encounter; more than 50% of the visit was spent counseling and/or coordinating care by the attending physician.      Geovani Bravo MD [facility]  Cardiovascular Disease  [phone]

## 2024-05-11 NOTE — PROGRESS NOTE ADULT - ASSESSMENT
73-year-old male past medical history hypertension, ESRD on dialysis Monday Wednesday Friday, diabetes insulin-dependent presents with hiccups patient endorses persistent hiccups for the last 2 days. Nephrology consulted for HD needs.    A/P  ESRD:  Center: [facility]  Nephrologist: Dr. Hardwick  Access: R AVF  MWF schedule  Vascular for fistulogram   s/p femoral shiley on 5/1, now removed  s/p placement of IJ shiley 5/3  Stopped CRRT   Restarted IHD  s/p HD 5/7 UF 1778; treatment terminated early due to hypotension   S/P MRA head/neck w/ gadolinium --> Received HD on 5/9 and 5/10.  5/10 Will need to dialyze 3 days consecutively--> plan for HD on 5/11  5/10 Spoke to ICU; will UF net even tomorrow  5/11 shiley removed due to bacteremia; No HD today  Consent obtained and placed in ED chart  Renal diet  Monitor BMP    Hyperkalemia  Better  Low K diet.  Monitor closely     HTN:  BP fluctuating.  Off pressors and antihypertensives.  Care per ICU.  Monitor BP.    Anemia:  At goal.  SILVA w/ HD  Monitor Hb    CKD-MBD  Check PTH  PO4 suboptimal - consider calcium acetate 667mg TID.  Low PO4 diet.  Monitor Ca, PO4 daily    Hypocalcemia:  In setting of CKD vs. hypoalbuminemia.  Optimize albumin.  Improving.  Monitor Ca.    D/W ICU team

## 2024-05-11 NOTE — PROGRESS NOTE ADULT - SUBJECTIVE AND OBJECTIVE BOX
Vascular Surgery Progress Note     Subjective:  Patient seen and examined on AM rounds. On HFNC, somnolent but arousable.    Vital Signs:  Vital Signs Last 24 Hrs  T(C): 37.4 (11 May 2024 08:00), Max: 38 (10 May 2024 16:00)  T(F): 99.3 (11 May 2024 08:00), Max: 100.4 (10 May 2024 16:00)  HR: 75 (11 May 2024 08:00) (75 - 91)  BP: 145/63 (11 May 2024 08:00) (89/56 - 146/63)  BP(mean): 83 (11 May 2024 08:00) (65 - 91)  RR: 24 (11 May 2024 08:00) (21 - 34)  SpO2: 99% (11 May 2024 08:00) (82% - 100%)    Parameters below as of 11 May 2024 08:00  Patient On (Oxygen Delivery Method): nasal cannula, high flow  O2 Flow (L/min): 40  O2 Concentration (%): 40    CAPILLARY BLOOD GLUCOSE      POCT Blood Glucose.: 152 mg/dL (11 May 2024 05:01)  POCT Blood Glucose.: 151 mg/dL (10 May 2024 23:09)  POCT Blood Glucose.: 142 mg/dL (10 May 2024 17:32)  POCT Blood Glucose.: 152 mg/dL (10 May 2024 12:33)      I&O's Detail    10 May 2024 07:01  -  11 May 2024 07:00  --------------------------------------------------------  IN:    Other (mL): 500 mL  Total IN: 500 mL    OUT:    Other (mL): 1000 mL  Total OUT: 1000 mL    Total NET: -500 mL            Physical Exam:  General: NAD, awake and alert  HEENT: Normocephalic atraumatic  Respiratory: Nonlabored respirations, on HFNC  Cardio: regular rate  Vascular: extremities are warm and well perfused, palpable b/l radial pulses, R AVF w/ faint palpable thrill, R IJ shiley in place      Labs:    05-11    144  |  97<L>  |  45<H>  ----------------------------<  164<H>  4.7   |  19<L>  |  4.24<H>    Ca    8.6      11 May 2024 00:20  Phos  6.3     05-11  Mg     2.50     05-11    TPro  6.6  /  Alb  3.1<L>  /  TBili  0.4  /  DBili  x   /  AST  16  /  ALT  26  /  AlkPhos  96  05-11    LIVER FUNCTIONS - ( 11 May 2024 00:20 )  Alb: 3.1 g/dL / Pro: 6.6 g/dL / ALK PHOS: 96 U/L / ALT: 26 U/L / AST: 16 U/L / GGT: x                                 10.3   27.31 )-----------( 254      ( 11 May 2024 00:20 )             33.0     PT/INR - ( 11 May 2024 00:20 )   PT: 13.1 sec;   INR: 1.16 ratio         PTT - ( 11 May 2024 00:20 )  PTT:31.4 sec

## 2024-05-11 NOTE — PROGRESS NOTE ADULT - SUBJECTIVE AND OBJECTIVE BOX
INTERVAL HPI/OVERNIGHT EVENTS:    SUBJECTIVE: Patient seen and examined at bedside. Intubated, sedated, ROS cannot be obtained.       VITAL SIGNS:  ICU Vital Signs Last 24 Hrs  T(C): 37.5 (11 May 2024 04:00), Max: 38 (10 May 2024 16:00)  T(F): 99.5 (11 May 2024 04:00), Max: 100.4 (10 May 2024 16:00)  HR: 83 (11 May 2024 07:00) (76 - 97)  BP: 133/75 (11 May 2024 07:00) (89/56 - 146/63)  BP(mean): 91 (11 May 2024 07:00) (65 - 91)  ABP: --  ABP(mean): --  RR: 28 (11 May 2024 07:00) (21 - 34)  SpO2: 95% (11 May 2024 07:00) (82% - 100%)    O2 Parameters below as of 11 May 2024 07:00  Patient On (Oxygen Delivery Method): nasal cannula, high flow  O2 Flow (L/min): 40  O2 Concentration (%): 40        Plateau pressure:   P/F ratio:     05-10 @ 07:01  -  05-11 @ 07:00  --------------------------------------------------------  IN: 500 mL / OUT: 1000 mL / NET: -500 mL      CAPILLARY BLOOD GLUCOSE      POCT Blood Glucose.: 152 mg/dL (11 May 2024 05:01)    ECG:    PHYSICAL EXAMINATION:  General: Comfortable, no acute distress, cooperative with exam.  HEENT: PERRLA, EOMI, moist mucous membranes.  Respiratory: CTAB, normal respiratory effort, no coughing, wheezes, crackles, or rales.  CV: RRR, S1S2, no murmurs, rubs or gallops. No JVD. Distal pulses intact.  Abdominal: Soft, nontender, nondistended, no rebound or guarding, normal bowel sounds.  Neurology: AOx3, no focal neuro defects, BLACK x 4.  Extremities: No pitting edema, + Peripheral pulses.  Incisions:   Tubes:    MEDICATIONS:  MEDICATIONS  (STANDING):  aspirin  chewable 81 milliGRAM(s) Oral daily  chlorhexidine 2% Cloths 1 Application(s) Topical <User Schedule>  dextrose 10% Bolus 125 milliLiter(s) IV Bolus once  dextrose 5%. 1000 milliLiter(s) (100 mL/Hr) IV Continuous <Continuous>  dextrose 5%. 1000 milliLiter(s) (50 mL/Hr) IV Continuous <Continuous>  dextrose 50% Injectable 25 Gram(s) IV Push once  dextrose 50% Injectable 12.5 Gram(s) IV Push once  epoetin reilly (EPOGEN) Injectable 21360 Unit(s) IV Push <User Schedule>  glucagon  Injectable 1 milliGRAM(s) IntraMuscular once  heparin   Injectable 5000 Unit(s) SubCutaneous every 12 hours  insulin lispro (ADMELOG) corrective regimen sliding scale   SubCutaneous every 6 hours    MEDICATIONS  (PRN):  dextrose Oral Gel 15 Gram(s) Oral once PRN Blood Glucose LESS THAN 70 milliGRAM(s)/deciliter      ALLERGIES:  Allergies    No Known Allergies    Intolerances        LABS:                        10.3   27.31 )-----------( 254      ( 11 May 2024 00:20 )             33.0     05-11    144  |  97<L>  |  45<H>  ----------------------------<  164<H>  4.7   |  19<L>  |  4.24<H>    Ca    8.6      11 May 2024 00:20  Phos  6.3     05-11  Mg     2.50     05-11    TPro  6.6  /  Alb  3.1<L>  /  TBili  0.4  /  DBili  x   /  AST  16  /  ALT  26  /  AlkPhos  96  05-11    PT/INR - ( 11 May 2024 00:20 )   PT: 13.1 sec;   INR: 1.16 ratio         PTT - ( 11 May 2024 00:20 )  PTT:31.4 sec  Urinalysis Basic - ( 11 May 2024 00:20 )    Color: x / Appearance: x / SG: x / pH: x  Gluc: 164 mg/dL / Ketone: x  / Bili: x / Urobili: x   Blood: x / Protein: x / Nitrite: x   Leuk Esterase: x / RBC: x / WBC x   Sq Epi: x / Non Sq Epi: x / Bacteria: x        RADIOLOGY & ADDITIONAL TESTS: Reviewed.   INTERVAL HPI/OVERNIGHT EVENTS:    SUBJECTIVE: Patient seen and examined at bedside.   Unable to speak but writing that he wants to talk to his wife and leave the hospital as soon as possible.   Denies any pain.       VITAL SIGNS:  ICU Vital Signs Last 24 Hrs  T(C): 37.5 (11 May 2024 04:00), Max: 38 (10 May 2024 16:00)  T(F): 99.5 (11 May 2024 04:00), Max: 100.4 (10 May 2024 16:00)  HR: 83 (11 May 2024 07:00) (76 - 97)  BP: 133/75 (11 May 2024 07:00) (89/56 - 146/63)  BP(mean): 91 (11 May 2024 07:00) (65 - 91)  ABP: --  ABP(mean): --  RR: 28 (11 May 2024 07:00) (21 - 34)  SpO2: 95% (11 May 2024 07:00) (82% - 100%)    O2 Parameters below as of 11 May 2024 07:00  Patient On (Oxygen Delivery Method): nasal cannula, high flow  O2 Flow (L/min): 40  O2 Concentration (%): 40        Plateau pressure:   P/F ratio:     05-10 @ 07:01  -  05-11 @ 07:00  --------------------------------------------------------  IN: 500 mL / OUT: 1000 mL / NET: -500 mL      CAPILLARY BLOOD GLUCOSE      POCT Blood Glucose.: 152 mg/dL (11 May 2024 05:01)    ECG:    PHYSICAL EXAMINATION:  General: Comfortable, no acute distress, cooperative with exam.  HEENT: PERRLA, EOMI, moist mucous membranes.  Respiratory: CTAB, normal respiratory effort, no coughing, wheezes, crackles, or rales.  CV: RRR, S1S2, no murmurs, rubs or gallops. No JVD. Distal pulses intact.  Abdominal: Soft, + RLQ TTP, nondistended, no rebound or guarding, normal bowel sounds.  Neurology: AOx3, no focal neuro defects, BLACK x 4.  Extremities: No pitting edema, + Peripheral pulses.      MEDICATIONS:  MEDICATIONS  (STANDING):  aspirin  chewable 81 milliGRAM(s) Oral daily  chlorhexidine 2% Cloths 1 Application(s) Topical <User Schedule>  dextrose 10% Bolus 125 milliLiter(s) IV Bolus once  dextrose 5%. 1000 milliLiter(s) (100 mL/Hr) IV Continuous <Continuous>  dextrose 5%. 1000 milliLiter(s) (50 mL/Hr) IV Continuous <Continuous>  dextrose 50% Injectable 25 Gram(s) IV Push once  dextrose 50% Injectable 12.5 Gram(s) IV Push once  epoetin reilly (EPOGEN) Injectable 77609 Unit(s) IV Push <User Schedule>  glucagon  Injectable 1 milliGRAM(s) IntraMuscular once  heparin   Injectable 5000 Unit(s) SubCutaneous every 12 hours  insulin lispro (ADMELOG) corrective regimen sliding scale   SubCutaneous every 6 hours    MEDICATIONS  (PRN):  dextrose Oral Gel 15 Gram(s) Oral once PRN Blood Glucose LESS THAN 70 milliGRAM(s)/deciliter      ALLERGIES:  Allergies    No Known Allergies    Intolerances        LABS:                        10.3   27.31 )-----------( 254      ( 11 May 2024 00:20 )             33.0     05-11    144  |  97<L>  |  45<H>  ----------------------------<  164<H>  4.7   |  19<L>  |  4.24<H>    Ca    8.6      11 May 2024 00:20  Phos  6.3     05-11  Mg     2.50     05-11    TPro  6.6  /  Alb  3.1<L>  /  TBili  0.4  /  DBili  x   /  AST  16  /  ALT  26  /  AlkPhos  96  05-11    PT/INR - ( 11 May 2024 00:20 )   PT: 13.1 sec;   INR: 1.16 ratio         PTT - ( 11 May 2024 00:20 )  PTT:31.4 sec  Urinalysis Basic - ( 11 May 2024 00:20 )    Color: x / Appearance: x / SG: x / pH: x  Gluc: 164 mg/dL / Ketone: x  / Bili: x / Urobili: x   Blood: x / Protein: x / Nitrite: x   Leuk Esterase: x / RBC: x / WBC x   Sq Epi: x / Non Sq Epi: x / Bacteria: x        RADIOLOGY & ADDITIONAL TESTS: Reviewed.

## 2024-05-11 NOTE — SWALLOW BEDSIDE ASSESSMENT ADULT - COMMENTS
MICU Resident Note 5/11: "73M w/ PMH of HTN, ESRD (MWF), IDDM, p/f chest pain c/b hiccups for the last 2 days undergoing ischemic evaluation per cardiology. Pt s/p RRT x 2 for AMS. MICU consulted and accepting for airway monitoring in setting of baclofen toxicity +/- stroke."  Per chart review - patient was intubated on 5/4 and extubated 5/9.    Chest xray 5/10: IMPRESSION: Opacity at the right lung base suspicious for lower lobe atelectasis.  Brain MRI 5/6: IMPRESSION: PUNCTATE FOCI OF RESTRICTED DIFFUSION IN THE LEFT DA AND LEFT CEREBELLUM WITH ASSOCIATED T2 PROLONGATION CONSISTENT WITH ACUTE INFARCTS. NO ACUTE INTRACRANIAL HEMORRHAGE. NO AREA OF ABNORMAL ENHANCEMENT.     Neurology Note Assessment 5/10: "AMS of unclear etiology - ? baclofen toxicity, no evidence of seizures. Metabolic encephalopathy  L pontine and L cerebellar strokes - embolic appearing  Intractable hiccups  hypoxic resp failure  ESRD on HD"    Patient received lethargic, rouses to verbal stimuli, positioned upright in bed, on 40% HFNC, RN at bedside.  Patient agreeable to assessment with head nod yes. MICU Resident Note 5/11: "73M w/ PMH of HTN, ESRD (MWF), IDDM, p/f chest pain c/b hiccups for the last 2 days undergoing ischemic evaluation per cardiology. Pt s/p RRT x 2 for AMS. MICU consulted and accepting for airway monitoring in setting of baclofen toxicity +/- stroke."  Per chart review - patient was intubated on 5/4 and extubated 5/9.    Chest xray 5/10: IMPRESSION: Opacity at the right lung base suspicious for lower lobe atelectasis.    Brain MRI 5/6: IMPRESSION: PUNCTATE FOCI OF RESTRICTED DIFFUSION IN THE LEFT DA AND LEFT CEREBELLUM WITH ASSOCIATED T2 PROLONGATION CONSISTENT WITH ACUTE INFARCTS. NO ACUTE INTRACRANIAL HEMORRHAGE. NO AREA OF ABNORMAL ENHANCEMENT.     Neurology Note Assessment 5/10: "AMS of unclear etiology - ? baclofen toxicity, no evidence of seizures. Metabolic encephalopathy  L pontine and L cerebellar strokes - embolic appearing  Intractable hiccups  hypoxic resp failure  ESRD on HD"    Patient received lethargic, rouses to verbal stimuli, positioned upright in bed, on 40% HFNC, RN at bedside.  Patient agreeable to assessment with head nod yes.

## 2024-05-12 LAB
ADD ON TEST-SPECIMEN IN LAB: SIGNIFICANT CHANGE UP
ALBUMIN SERPL ELPH-MCNC: 2.9 G/DL — LOW (ref 3.3–5)
ALBUMIN SERPL ELPH-MCNC: 3.5 G/DL — SIGNIFICANT CHANGE UP (ref 3.3–5)
ALP SERPL-CCNC: 109 U/L — SIGNIFICANT CHANGE UP (ref 40–120)
ALP SERPL-CCNC: 144 U/L — HIGH (ref 40–120)
ALT FLD-CCNC: 16 U/L — SIGNIFICANT CHANGE UP (ref 4–41)
ALT FLD-CCNC: 25 U/L — SIGNIFICANT CHANGE UP (ref 4–41)
ANION GAP SERPL CALC-SCNC: 28 MMOL/L — HIGH (ref 7–14)
ANION GAP SERPL CALC-SCNC: 31 MMOL/L — HIGH (ref 7–14)
APTT BLD: 28.2 SEC — SIGNIFICANT CHANGE UP (ref 24.5–35.6)
AST SERPL-CCNC: 10 U/L — SIGNIFICANT CHANGE UP (ref 4–40)
AST SERPL-CCNC: 17 U/L — SIGNIFICANT CHANGE UP (ref 4–40)
B PERT IGG+IGM PNL SER: ABNORMAL
B-OH-BUTYR SERPL-SCNC: 2.2 MMOL/L — HIGH (ref 0–0.4)
BASE EXCESS BLDV CALC-SCNC: -1.9 MMOL/L — SIGNIFICANT CHANGE UP (ref -2–3)
BASE EXCESS BLDV CALC-SCNC: -9.1 MMOL/L — LOW (ref -2–3)
BASOPHILS # BLD AUTO: 0.06 K/UL — SIGNIFICANT CHANGE UP (ref 0–0.2)
BASOPHILS NFR BLD AUTO: 0.2 % — SIGNIFICANT CHANGE UP (ref 0–2)
BILIRUB SERPL-MCNC: 0.3 MG/DL — SIGNIFICANT CHANGE UP (ref 0.2–1.2)
BILIRUB SERPL-MCNC: 0.5 MG/DL — SIGNIFICANT CHANGE UP (ref 0.2–1.2)
BLD GP AB SCN SERPL QL: NEGATIVE — SIGNIFICANT CHANGE UP
BLOOD GAS ARTERIAL COMPREHENSIVE RESULT: SIGNIFICANT CHANGE UP
BLOOD GAS ARTERIAL COMPREHENSIVE RESULT: SIGNIFICANT CHANGE UP
BLOOD GAS VENOUS COMPREHENSIVE RESULT: SIGNIFICANT CHANGE UP
BLOOD GAS VENOUS COMPREHENSIVE RESULT: SIGNIFICANT CHANGE UP
BUN SERPL-MCNC: 80 MG/DL — HIGH (ref 7–23)
BUN SERPL-MCNC: 97 MG/DL — HIGH (ref 7–23)
CA-I BLD-SCNC: 0.99 MMOL/L — LOW (ref 1.15–1.29)
CALCIUM SERPL-MCNC: 8.7 MG/DL — SIGNIFICANT CHANGE UP (ref 8.4–10.5)
CALCIUM SERPL-MCNC: 9.8 MG/DL — SIGNIFICANT CHANGE UP (ref 8.4–10.5)
CHLORIDE BLDV-SCNC: 95 MMOL/L — LOW (ref 96–108)
CHLORIDE BLDV-SCNC: 98 MMOL/L — SIGNIFICANT CHANGE UP (ref 96–108)
CHLORIDE SERPL-SCNC: 92 MMOL/L — LOW (ref 98–107)
CHLORIDE SERPL-SCNC: 93 MMOL/L — LOW (ref 98–107)
CO2 BLDV-SCNC: 19.3 MMOL/L — LOW (ref 22–26)
CO2 BLDV-SCNC: 25.7 MMOL/L — SIGNIFICANT CHANGE UP (ref 22–26)
CO2 SERPL-SCNC: 13 MMOL/L — LOW (ref 22–31)
CO2 SERPL-SCNC: 19 MMOL/L — LOW (ref 22–31)
COLOR FLD: ABNORMAL
CREAT SERPL-MCNC: 6.68 MG/DL — HIGH (ref 0.5–1.3)
CREAT SERPL-MCNC: 7.97 MG/DL — HIGH (ref 0.5–1.3)
CULTURE RESULTS: ABNORMAL
EGFR: 7 ML/MIN/1.73M2 — LOW
EGFR: 8 ML/MIN/1.73M2 — LOW
EOSINOPHIL # BLD AUTO: 0 K/UL — SIGNIFICANT CHANGE UP (ref 0–0.5)
EOSINOPHIL NFR BLD AUTO: 0 % — SIGNIFICANT CHANGE UP (ref 0–6)
FLUID INTAKE SUBSTANCE CLASS: SIGNIFICANT CHANGE UP
GAS PNL BLDV: 132 MMOL/L — LOW (ref 136–145)
GAS PNL BLDV: 134 MMOL/L — LOW (ref 136–145)
GAS PNL BLDV: SIGNIFICANT CHANGE UP
GLUCOSE BLDC GLUCOMTR-MCNC: 184 MG/DL — HIGH (ref 70–99)
GLUCOSE BLDC GLUCOMTR-MCNC: 189 MG/DL — HIGH (ref 70–99)
GLUCOSE BLDC GLUCOMTR-MCNC: 192 MG/DL — HIGH (ref 70–99)
GLUCOSE BLDC GLUCOMTR-MCNC: 214 MG/DL — HIGH (ref 70–99)
GLUCOSE BLDC GLUCOMTR-MCNC: 224 MG/DL — HIGH (ref 70–99)
GLUCOSE BLDV-MCNC: 205 MG/DL — HIGH (ref 70–99)
GLUCOSE BLDV-MCNC: 249 MG/DL — HIGH (ref 70–99)
GLUCOSE SERPL-MCNC: 184 MG/DL — HIGH (ref 70–99)
GLUCOSE SERPL-MCNC: 221 MG/DL — HIGH (ref 70–99)
GRAM STN FLD: ABNORMAL
HCO3 BLDV-SCNC: 18 MMOL/L — LOW (ref 22–29)
HCO3 BLDV-SCNC: 24 MMOL/L — SIGNIFICANT CHANGE UP (ref 22–29)
HCT VFR BLD CALC: 29.1 % — LOW (ref 39–50)
HCT VFR BLD CALC: 37.3 % — LOW (ref 39–50)
HCT VFR BLDA CALC: 34 % — LOW (ref 39–51)
HCT VFR BLDA CALC: 38 % — LOW (ref 39–51)
HGB BLD CALC-MCNC: 11.4 G/DL — LOW (ref 12.6–17.4)
HGB BLD CALC-MCNC: 12.8 G/DL — SIGNIFICANT CHANGE UP (ref 12.6–17.4)
HGB BLD-MCNC: 12.4 G/DL — LOW (ref 13–17)
HGB BLD-MCNC: 9.4 G/DL — LOW (ref 13–17)
IANC: 26.59 K/UL — HIGH (ref 1.8–7.4)
IMM GRANULOCYTES NFR BLD AUTO: 1.6 % — HIGH (ref 0–0.9)
INR BLD: 1.13 RATIO — SIGNIFICANT CHANGE UP (ref 0.85–1.18)
LACTATE BLDV-MCNC: 2.6 MMOL/L — HIGH (ref 0.5–2)
LACTATE BLDV-MCNC: 2.7 MMOL/L — HIGH (ref 0.5–2)
LYMPHOCYTES # BLD AUTO: 0.47 K/UL — LOW (ref 1–3.3)
LYMPHOCYTES # BLD AUTO: 1.7 % — LOW (ref 13–44)
LYMPHOCYTES # FLD: 5 % — SIGNIFICANT CHANGE UP
MAGNESIUM SERPL-MCNC: 2.9 MG/DL — HIGH (ref 1.6–2.6)
MAGNESIUM SERPL-MCNC: 3 MG/DL — HIGH (ref 1.6–2.6)
MCHC RBC-ENTMCNC: 20 PG — LOW (ref 27–34)
MCHC RBC-ENTMCNC: 20.8 PG — LOW (ref 27–34)
MCHC RBC-ENTMCNC: 32.3 GM/DL — SIGNIFICANT CHANGE UP (ref 32–36)
MCHC RBC-ENTMCNC: 33.2 GM/DL — SIGNIFICANT CHANGE UP (ref 32–36)
MCV RBC AUTO: 62 FL — LOW (ref 80–100)
MCV RBC AUTO: 62.5 FL — LOW (ref 80–100)
MESOTHL CELL # FLD: 4 % — SIGNIFICANT CHANGE UP
MONOCYTES # BLD AUTO: 0.66 K/UL — SIGNIFICANT CHANGE UP (ref 0–0.9)
MONOCYTES NFR BLD AUTO: 2.3 % — SIGNIFICANT CHANGE UP (ref 2–14)
MONOS+MACROS # FLD: 13 % — SIGNIFICANT CHANGE UP
NEUTROPHILS # BLD AUTO: 26.59 K/UL — HIGH (ref 1.8–7.4)
NEUTROPHILS NFR BLD AUTO: 94.2 % — HIGH (ref 43–77)
NEUTROPHILS-BODY FLUID: 78 % — SIGNIFICANT CHANGE UP
NRBC # BLD: 0 /100 WBCS — SIGNIFICANT CHANGE UP (ref 0–0)
NRBC # BLD: 0 /100 WBCS — SIGNIFICANT CHANGE UP (ref 0–0)
NRBC # FLD: 0 K/UL — SIGNIFICANT CHANGE UP (ref 0–0)
NRBC # FLD: 0.07 K/UL — HIGH (ref 0–0)
PCO2 BLDV: 43 MMHG — SIGNIFICANT CHANGE UP (ref 42–55)
PCO2 BLDV: 46 MMHG — SIGNIFICANT CHANGE UP (ref 42–55)
PH BLDV: 7.23 — LOW (ref 7.32–7.43)
PH BLDV: 7.33 — SIGNIFICANT CHANGE UP (ref 7.32–7.43)
PHOSPHATE SERPL-MCNC: 8.1 MG/DL — HIGH (ref 2.5–4.5)
PHOSPHATE SERPL-MCNC: 9 MG/DL — HIGH (ref 2.5–4.5)
PLATELET # BLD AUTO: 403 K/UL — HIGH (ref 150–400)
PLATELET # BLD AUTO: 435 K/UL — HIGH (ref 150–400)
PO2 BLDV: 28 MMHG — SIGNIFICANT CHANGE UP (ref 25–45)
PO2 BLDV: 53 MMHG — HIGH (ref 25–45)
POTASSIUM BLDV-SCNC: 5.4 MMOL/L — HIGH (ref 3.5–5.1)
POTASSIUM BLDV-SCNC: 5.6 MMOL/L — HIGH (ref 3.5–5.1)
POTASSIUM SERPL-MCNC: 5.2 MMOL/L — SIGNIFICANT CHANGE UP (ref 3.5–5.3)
POTASSIUM SERPL-MCNC: 5.3 MMOL/L — SIGNIFICANT CHANGE UP (ref 3.5–5.3)
POTASSIUM SERPL-SCNC: 5.2 MMOL/L — SIGNIFICANT CHANGE UP (ref 3.5–5.3)
POTASSIUM SERPL-SCNC: 5.3 MMOL/L — SIGNIFICANT CHANGE UP (ref 3.5–5.3)
PROT SERPL-MCNC: 6.6 G/DL — SIGNIFICANT CHANGE UP (ref 6–8.3)
PROT SERPL-MCNC: 8.4 G/DL — HIGH (ref 6–8.3)
PROTHROM AB SERPL-ACNC: 12.7 SEC — SIGNIFICANT CHANGE UP (ref 9.5–13)
RBC # BLD: 4.69 M/UL — SIGNIFICANT CHANGE UP (ref 4.2–5.8)
RBC # BLD: 5.97 M/UL — HIGH (ref 4.2–5.8)
RBC # FLD: 19.7 % — HIGH (ref 10.3–14.5)
RBC # FLD: 20.1 % — HIGH (ref 10.3–14.5)
RCV VOL RI: SIGNIFICANT CHANGE UP CELLS/UL (ref 0–5)
RH IG SCN BLD-IMP: POSITIVE — SIGNIFICANT CHANGE UP
SAO2 % BLDV: 30.2 % — LOW (ref 67–88)
SAO2 % BLDV: 72.6 % — SIGNIFICANT CHANGE UP (ref 67–88)
SODIUM SERPL-SCNC: 137 MMOL/L — SIGNIFICANT CHANGE UP (ref 135–145)
SODIUM SERPL-SCNC: 139 MMOL/L — SIGNIFICANT CHANGE UP (ref 135–145)
SPECIMEN SOURCE: SIGNIFICANT CHANGE UP
SPECIMEN SOURCE: SIGNIFICANT CHANGE UP
TOTAL CELLS COUNTED, BODY FLUID: 100 CELLS — SIGNIFICANT CHANGE UP
TOTAL NUCLEATED CELL COUNT, BODY FLUID: SIGNIFICANT CHANGE UP CELLS/UL (ref 0–5)
TUBE TYPE: SIGNIFICANT CHANGE UP
VANCOMYCIN FLD-MCNC: 39 UG/ML
WBC # BLD: 28.22 K/UL — HIGH (ref 3.8–10.5)
WBC # BLD: 31.3 K/UL — HIGH (ref 3.8–10.5)
WBC # FLD AUTO: 28.22 K/UL — HIGH (ref 3.8–10.5)
WBC # FLD AUTO: 31.3 K/UL — HIGH (ref 3.8–10.5)

## 2024-05-12 PROCEDURE — 74177 CT ABD & PELVIS W/CONTRAST: CPT | Mod: 26

## 2024-05-12 PROCEDURE — 31624 DX BRONCHOSCOPE/LAVAGE: CPT | Mod: GC

## 2024-05-12 PROCEDURE — 31500 INSERT EMERGENCY AIRWAY: CPT | Mod: GC

## 2024-05-12 PROCEDURE — 31646 BRNCHSC W/THER ASPIR SBSQ: CPT | Mod: GC

## 2024-05-12 PROCEDURE — 71045 X-RAY EXAM CHEST 1 VIEW: CPT | Mod: 26

## 2024-05-12 PROCEDURE — 99291 CRITICAL CARE FIRST HOUR: CPT | Mod: GC,25

## 2024-05-12 PROCEDURE — 71045 X-RAY EXAM CHEST 1 VIEW: CPT | Mod: 26,77

## 2024-05-12 RX ORDER — CHLORHEXIDINE GLUCONATE 213 G/1000ML
15 SOLUTION TOPICAL EVERY 12 HOURS
Refills: 0 | Status: DISCONTINUED | OUTPATIENT
Start: 2024-05-12 | End: 2024-06-07

## 2024-05-12 RX ORDER — HEPARIN SODIUM 5000 [USP'U]/ML
INJECTION INTRAVENOUS; SUBCUTANEOUS
Qty: 25000 | Refills: 0 | Status: DISCONTINUED | OUTPATIENT
Start: 2024-05-12 | End: 2024-05-13

## 2024-05-12 RX ORDER — SODIUM ZIRCONIUM CYCLOSILICATE 10 G/10G
10 POWDER, FOR SUSPENSION ORAL ONCE
Refills: 0 | Status: COMPLETED | OUTPATIENT
Start: 2024-05-12 | End: 2024-05-12

## 2024-05-12 RX ORDER — AMLODIPINE BESYLATE 2.5 MG/1
5 TABLET ORAL DAILY
Refills: 0 | Status: DISCONTINUED | OUTPATIENT
Start: 2024-05-12 | End: 2024-05-12

## 2024-05-12 RX ORDER — PROPOFOL 10 MG/ML
40 INJECTION, EMULSION INTRAVENOUS ONCE
Refills: 0 | Status: COMPLETED | OUTPATIENT
Start: 2024-05-12 | End: 2024-05-12

## 2024-05-12 RX ORDER — PROPOFOL 10 MG/ML
19.99 INJECTION, EMULSION INTRAVENOUS
Qty: 1000 | Refills: 0 | Status: DISCONTINUED | OUTPATIENT
Start: 2024-05-12 | End: 2024-05-16

## 2024-05-12 RX ORDER — AMLODIPINE BESYLATE 2.5 MG/1
5 TABLET ORAL ONCE
Refills: 0 | Status: COMPLETED | OUTPATIENT
Start: 2024-05-12 | End: 2024-05-12

## 2024-05-12 RX ORDER — NIFEDIPINE 30 MG
60 TABLET, EXTENDED RELEASE 24 HR ORAL
Refills: 0 | Status: DISCONTINUED | OUTPATIENT
Start: 2024-05-12 | End: 2024-05-12

## 2024-05-12 RX ORDER — SODIUM CHLORIDE 9 MG/ML
4 INJECTION INTRAMUSCULAR; INTRAVENOUS; SUBCUTANEOUS EVERY 12 HOURS
Refills: 0 | Status: DISCONTINUED | OUTPATIENT
Start: 2024-05-12 | End: 2024-06-01

## 2024-05-12 RX ORDER — SEVELAMER CARBONATE 2400 MG/1
800 POWDER, FOR SUSPENSION ORAL EVERY 8 HOURS
Refills: 0 | Status: DISCONTINUED | OUTPATIENT
Start: 2024-05-12 | End: 2024-05-14

## 2024-05-12 RX ORDER — MIDAZOLAM HYDROCHLORIDE 1 MG/ML
8 INJECTION, SOLUTION INTRAMUSCULAR; INTRAVENOUS ONCE
Refills: 0 | Status: DISCONTINUED | OUTPATIENT
Start: 2024-05-12 | End: 2024-05-12

## 2024-05-12 RX ORDER — NOREPINEPHRINE BITARTRATE/D5W 8 MG/250ML
0.05 PLASTIC BAG, INJECTION (ML) INTRAVENOUS
Qty: 8 | Refills: 0 | Status: DISCONTINUED | OUTPATIENT
Start: 2024-05-12 | End: 2024-05-13

## 2024-05-12 RX ORDER — CALCIUM GLUCONATE 100 MG/ML
2 VIAL (ML) INTRAVENOUS ONCE
Refills: 0 | Status: COMPLETED | OUTPATIENT
Start: 2024-05-12 | End: 2024-05-12

## 2024-05-12 RX ORDER — SODIUM BICARBONATE 1 MEQ/ML
650 SYRINGE (ML) INTRAVENOUS EVERY 8 HOURS
Refills: 0 | Status: DISCONTINUED | OUTPATIENT
Start: 2024-05-12 | End: 2024-06-03

## 2024-05-12 RX ADMIN — HEPARIN SODIUM 1000 UNIT(S)/HR: 5000 INJECTION INTRAVENOUS; SUBCUTANEOUS at 20:53

## 2024-05-12 RX ADMIN — Medication 200 GRAM(S): at 05:22

## 2024-05-12 RX ADMIN — CHLORHEXIDINE GLUCONATE 1 APPLICATION(S): 213 SOLUTION TOPICAL at 05:23

## 2024-05-12 RX ADMIN — Medication 2: at 00:17

## 2024-05-12 RX ADMIN — Medication 2: at 05:27

## 2024-05-12 RX ADMIN — Medication 2: at 23:44

## 2024-05-12 RX ADMIN — Medication 4: at 11:38

## 2024-05-12 RX ADMIN — SODIUM CHLORIDE 4 MILLILITER(S): 9 INJECTION INTRAMUSCULAR; INTRAVENOUS; SUBCUTANEOUS at 21:39

## 2024-05-12 RX ADMIN — Medication 650 MILLIGRAM(S): at 17:32

## 2024-05-12 RX ADMIN — PROPOFOL 40 MILLIGRAM(S): 10 INJECTION, EMULSION INTRAVENOUS at 13:31

## 2024-05-12 RX ADMIN — CHLORHEXIDINE GLUCONATE 15 MILLILITER(S): 213 SOLUTION TOPICAL at 17:05

## 2024-05-12 RX ADMIN — SODIUM ZIRCONIUM CYCLOSILICATE 10 GRAM(S): 10 POWDER, FOR SUSPENSION ORAL at 09:53

## 2024-05-12 RX ADMIN — PROPOFOL 6.31 MICROGRAM(S)/KG/MIN: 10 INJECTION, EMULSION INTRAVENOUS at 17:08

## 2024-05-12 RX ADMIN — MIDAZOLAM HYDROCHLORIDE 8 MILLIGRAM(S): 1 INJECTION, SOLUTION INTRAMUSCULAR; INTRAVENOUS at 13:35

## 2024-05-12 RX ADMIN — PROPOFOL 6.31 MICROGRAM(S)/KG/MIN: 10 INJECTION, EMULSION INTRAVENOUS at 20:34

## 2024-05-12 RX ADMIN — MEROPENEM 100 MILLIGRAM(S): 1 INJECTION INTRAVENOUS at 19:54

## 2024-05-12 RX ADMIN — Medication 4: at 17:29

## 2024-05-12 RX ADMIN — SEVELAMER CARBONATE 800 MILLIGRAM(S): 2400 POWDER, FOR SUSPENSION ORAL at 21:24

## 2024-05-12 RX ADMIN — Medication 81 MILLIGRAM(S): at 11:38

## 2024-05-12 RX ADMIN — SODIUM ZIRCONIUM CYCLOSILICATE 10 GRAM(S): 10 POWDER, FOR SUSPENSION ORAL at 14:06

## 2024-05-12 RX ADMIN — Medication 650 MILLIGRAM(S): at 21:24

## 2024-05-12 RX ADMIN — Medication 4.93 MICROGRAM(S)/KG/MIN: at 20:34

## 2024-05-12 RX ADMIN — SODIUM CHLORIDE 4 MILLILITER(S): 9 INJECTION INTRAMUSCULAR; INTRAVENOUS; SUBCUTANEOUS at 03:13

## 2024-05-12 RX ADMIN — AMLODIPINE BESYLATE 5 MILLIGRAM(S): 2.5 TABLET ORAL at 06:08

## 2024-05-12 RX ADMIN — HEPARIN SODIUM 5000 UNIT(S): 5000 INJECTION INTRAVENOUS; SUBCUTANEOUS at 17:05

## 2024-05-12 RX ADMIN — Medication 4.93 MICROGRAM(S)/KG/MIN: at 14:04

## 2024-05-12 RX ADMIN — HEPARIN SODIUM 5000 UNIT(S): 5000 INJECTION INTRAVENOUS; SUBCUTANEOUS at 05:28

## 2024-05-12 RX ADMIN — AMLODIPINE BESYLATE 5 MILLIGRAM(S): 2.5 TABLET ORAL at 09:53

## 2024-05-12 RX ADMIN — PROPOFOL 6.31 MICROGRAM(S)/KG/MIN: 10 INJECTION, EMULSION INTRAVENOUS at 14:04

## 2024-05-12 RX ADMIN — Medication 4.93 MICROGRAM(S)/KG/MIN: at 17:08

## 2024-05-12 NOTE — PROGRESS NOTE ADULT - ASSESSMENT
73-year-old male past medical history hypertension, ESRD on dialysis Monday Wednesday Friday, diabetes insulin-dependent presents with hiccups patient endorses persistent hiccups for the last 2 days. Nephrology consulted for HD needs.    A/P  ESRD:  Center: [facility]  Nephrologist: Dr. Hardwick  Access: R AVF  MWF schedule  Vascular for fistulogram   s/p femoral shiley on 5/1, now removed  s/p placement of IJ shiley 5/3  Stopped CRRT   Restarted IHD  s/p HD 5/7 UF 1778; treatment terminated early due to hypotension   S/P MRA head/neck w/ gadolinium --> Received HD on 5/9 and 5/10.  5/10 Will need to dialyze 3 days consecutively--> plan for HD on 5/11  5/10 Spoke to ICU; will UF net even tomorrow  5/11 shiley removed due to bacteremia; did not receive HD.  Line holiday  5/12 - BUN worsened; K up trending.  D/W ICU will need shiley and plan to dialyze tomorrow.  On vancomycin - adjust dose per vanco trough level; level 39 today.  Consent obtained and placed in ED chart  Renal diet  Monitor BMP    Hyperkalemia  Up trending.  Lokelma 10gm x2 doses today.  HD tomorrow as planned.  Low K diet.  Monitor closely     HTN:  BP fluctuating.  Off pressors and antihypertensives.  Care per ICU.  Monitor BP.    Anemia:  Above goal.  SILVA w/ HD; hold for Hgb >11.  Monitor Hb.    CKD-MBD  Check PTH  PO4 worsened.  Consider sevelamer 1600mg TID.  Low PO4 diet.  Monitor Ca, PO4 daily    Hypocalcemia:  In setting of CKD vs. hypoalbuminemia.  Optimize albumin.  Improving.  Monitor Ca.    D/W ICU team and family at bedside.

## 2024-05-12 NOTE — PROGRESS NOTE ADULT - SUBJECTIVE AND OBJECTIVE BOX
[facility]   MD ELIANE BHAGAT MD ANGELA WONG, PA QIAN CHEN, NP    TEL:  OFFICE: [phone]  From 5pm-7am Answering Service [phone]    -- RENAL FOLLOW UP NOTE ---Date of Service 05-12-24 @ 11:32    Patient is a 73y old  Male who presents with a chief complaint of Unstable angina, abn EKG.    Patient seen and examined at bedside, in ICU. No chest pain/SOB.    VITALS:  T(F): 98 (05-12-24 @ 08:00), Max: 100.1 (05-11-24 @ 16:00)  HR: 84 (05-12-24 @ 11:22)  BP: 132/120 (05-12-24 @ 11:00)  RR: 24 (05-12-24 @ 11:00)  SpO2: 95% (05-12-24 @ 11:22)  Wt(kg): --    05-11 @ 07:01  -  05-12 @ 07:00  --------------------------------------------------------  IN: 400 mL / OUT: 0 mL / NET: 400 mL      PHYSICAL EXAM:  General: NAD  Neck: No JVD  Respiratory: CTAB, no wheezes, rales or rhonchi  Cardiovascular: S1, S2, RRR  Gastrointestinal: BS+, soft, NT/ND  Extremities: No peripheral edema    Hospital Medications:   MEDICATIONS  (STANDING):  aspirin  chewable 81 milliGRAM(s) Oral daily  chlorhexidine 2% Cloths 1 Application(s) Topical <User Schedule>  dextrose 10% Bolus 125 milliLiter(s) IV Bolus once  dextrose 5%. 1000 milliLiter(s) (100 mL/Hr) IV Continuous <Continuous>  dextrose 5%. 1000 milliLiter(s) (50 mL/Hr) IV Continuous <Continuous>  dextrose 50% Injectable 25 Gram(s) IV Push once  dextrose 50% Injectable 12.5 Gram(s) IV Push once  epoetin reilly (EPOGEN) Injectable 83741 Unit(s) IV Push <User Schedule>  glucagon  Injectable 1 milliGRAM(s) IntraMuscular once  heparin   Injectable 5000 Unit(s) SubCutaneous every 12 hours  insulin lispro (ADMELOG) corrective regimen sliding scale   SubCutaneous every 6 hours  meropenem  IVPB 500 milliGRAM(s) IV Intermittent every 24 hours  sodium chloride 3%  Inhalation 4 milliLiter(s) Inhalation every 12 hours  sodium zirconium cyclosilicate 10 Gram(s) Oral once      LABS:  05-12    139  |  92<L>  |  80<H>  ----------------------------<  184<H>  5.3   |  19<L>  |  6.68<H>    Ca    9.8      12 May 2024 03:00  Phos  8.1     05-12  Mg     3.00     05-12    TPro  8.4<H>  /  Alb  3.5  /  TBili  0.5  /  DBili      /  AST  17  /  ALT  25  /  AlkPhos  144<H>  05-12    Creatinine Trend: 6.68 <--, 4.24 <--, 4.23 <--, 3.99 <--, 5.81 <--, 3.94 <--, 3.77 <--, 1.83 <--, 2.06 <--    Albumin: 3.5 g/dL (05-12 @ 03:00)  Phosphorus: 8.1 mg/dL (05-12 @ 03:00)                          12.4   28.22 )-----------( 403      ( 12 May 2024 03:00 )             37.3     Urine Studies:  Urinalysis - [05-12-24 @ 03:00]      Color  / Appearance  / SG  / pH       Gluc 184 / Ketone   / Bili  / Urobili        Blood  / Protein  / Leuk Est  / Nitrite       RBC  / WBC  / Hyaline  / Gran  / Sq Epi  / Non Sq Epi  / Bacteria       Iron 47, TIBC --, %sat --      [05-01-24 @ 06:44]  TSH 2.79      [04-30-24 @ 06:03]    HBsAb <3.0      [05-01-24 @ 14:42]  HBcAb Nonreact      [05-01-24 @ 14:42]

## 2024-05-12 NOTE — PROCEDURE NOTE - NSBRONCHHISTORY_GEN_A_CORE_FT
74YO male w/ PMH of HTN, ESRD (MWF), IDDM, p/f chest pain c/b hiccups who was admitted to MICU with AMS and respiratory failure on Baclofen s/p intubation and extubation now reintubated again due to aspiration and acute hypoxic respiratory failure

## 2024-05-12 NOTE — PROGRESS NOTE ADULT - SUBJECTIVE AND OBJECTIVE BOX
[facility] DIV[facility] of INFECTIOUS DISEASE  Kenton Fox MD PhD, Bell Wheatley MD, Saray Toribio MD, Delano Ross MD, Steven Torres MD  and providing coverage with Radha Duvall MD  Providing Infectious Disease Consultations at [facility], [facility], [place], [facility], [facility]'s    Office# [phone] to schedule follow up appointments  Answering Service for urgent calls or New Consults [phone]  Cell# to text for urgent issues Kenton Fox [phone]     infectious diseases progress note:    JIMMY BLAND is a 73y y. o. Male patient    Overnight and events of the last 24hrs reviewed    Allergies    No Known Allergies    Intolerances        ANTIBIOTICS/RELEVANT:  antimicrobials  meropenem  IVPB 500 milliGRAM(s) IV Intermittent every 24 hours    immunologic:  epoetin reilly (EPOGEN) Injectable 68644 Unit(s) IV Push <User Schedule>    OTHER:  aspirin  chewable 81 milliGRAM(s) Oral daily  chlorhexidine 2% Cloths 1 Application(s) Topical <User Schedule>  dextrose 10% Bolus 125 milliLiter(s) IV Bolus once  dextrose 5%. 1000 milliLiter(s) IV Continuous <Continuous>  dextrose 5%. 1000 milliLiter(s) IV Continuous <Continuous>  dextrose 50% Injectable 25 Gram(s) IV Push once  dextrose 50% Injectable 12.5 Gram(s) IV Push once  dextrose Oral Gel 15 Gram(s) Oral once PRN  glucagon  Injectable 1 milliGRAM(s) IntraMuscular once  heparin   Injectable 5000 Unit(s) SubCutaneous every 12 hours  insulin lispro (ADMELOG) corrective regimen sliding scale   SubCutaneous every 6 hours  sevelamer carbonate 800 milliGRAM(s) Oral every 8 hours  sodium chloride 3%  Inhalation 4 milliLiter(s) Inhalation every 12 hours  sodium zirconium cyclosilicate 10 Gram(s) Oral once      Objective:  Vital Signs Last 24 Hrs  T(C): 36.7 (12 May 2024 08:00), Max: 37.8 (11 May 2024 16:00)  T(F): 98 (12 May 2024 08:00), Max: 100.1 (11 May 2024 16:00)  HR: 84 (12 May 2024 11:22) (79 - 93)  BP: 132/120 (12 May 2024 11:00) (114/58 - 197/85)  BP(mean): 126 (12 May 2024 11:00) (72 - 134)  RR: 24 (12 May 2024 11:00) (22 - 31)  SpO2: 95% (12 May 2024 11:22) (88% - 100%)    Parameters below as of 12 May 2024 11:22  Patient On (Oxygen Delivery Method): nasal cannula        T(C): 36.7 (05-12-24 @ 08:00), Max: 38 (05-10-24 @ 16:00)  T(C): 36.7 (05-12-24 @ 08:00), Max: 38.6 (05-10-24 @ 04:10)  T(C): 36.7 (05-12-24 @ 08:00), Max: 38.6 (05-10-24 @ 04:10)    PHYSICAL EXAM:  HEENT: NC atraumatic, tube in nares  Neck: supple  Respiratory: no accessory muscle use, breathing comfortably  Cardiovascular: distant  Gastrointestinal: normal appearing, nondistended  Extremities: no clubbing, no cyanosis,  Neuro: limited      LABS:                          12.4   28.22 )-----------( 403      ( 12 May 2024 03:00 )             37.3       WBC  28.22 05-12 @ 03:00  27.31 05-11 @ 00:20  14.29 05-10 @ 00:50  12.78 05-09 @ 23:57  9.43 05-09 @ 00:50  8.14 05-08 @ 01:33  12.10 05-07 @ 00:30  16.47 05-06 @ 00:00  16.48 05-05 @ 16:30      05-12    139  |  92<L>  |  80<H>  ----------------------------<  184<H>  5.3   |  19<L>  |  6.68<H>    Ca    9.8      12 May 2024 03:00  Phos  8.1     05-12  Mg     3.00     05-12    TPro  8.4<H>  /  Alb  3.5  /  TBili  0.5  /  DBili  x   /  AST  17  /  ALT  25  /  AlkPhos  144<H>  05-12      Creatinine: 6.68 mg/dL (05-12-24 @ 03:00)  Creatinine: 4.24 mg/dL (05-11-24 @ 00:20)  Creatinine: 4.23 mg/dL (05-10-24 @ 00:50)  Creatinine: 3.99 mg/dL (05-09-24 @ 23:57)  Creatinine: 5.81 mg/dL (05-09-24 @ 00:50)  Creatinine: 3.94 mg/dL (05-08-24 @ 01:33)  Creatinine: 3.77 mg/dL (05-07-24 @ 00:30)  Creatinine: 1.83 mg/dL (05-06-24 @ 00:00)  Creatinine: 2.06 mg/dL (05-05-24 @ 16:30)      PT/INR - ( 11 May 2024 00:20 )   PT: 13.1 sec;   INR: 1.16 ratio         PTT - ( 11 May 2024 00:20 )  PTT:31.4 sec  Urinalysis Basic - ( 12 May 2024 03:00 )    Color: x / Appearance: x / SG: x / pH: x  Gluc: 184 mg/dL / Ketone: x  / Bili: x / Urobili: x   Blood: x / Protein: x / Nitrite: x   Leuk Esterase: x / RBC: x / WBC x   Sq Epi: x / Non Sq Epi: x / Bacteria: x            INFLAMMATORY MARKERS      MICROBIOLOGY:              RADIOLOGY & ADDITIONAL STUDIES:

## 2024-05-12 NOTE — PROGRESS NOTE ADULT - SUBJECTIVE AND OBJECTIVE BOX
PROGRESS NOTE:   Authored by Dr. Asif Martínez MD     Patient is a 73y old  Male who presents with a chief complaint of Unstable angina, abn EKG (11 May 2024 15:34)      SUBJECTIVE / OVERNIGHT EVENTS:  Ng tube placed and started on amlodipine. Patient has no acute complaints this morning.     MEDICATIONS  (STANDING):  amLODIPine   Tablet 5 milliGRAM(s) Oral daily  aspirin  chewable 81 milliGRAM(s) Oral daily  chlorhexidine 2% Cloths 1 Application(s) Topical <User Schedule>  dextrose 10% Bolus 125 milliLiter(s) IV Bolus once  dextrose 5%. 1000 milliLiter(s) (100 mL/Hr) IV Continuous <Continuous>  dextrose 5%. 1000 milliLiter(s) (50 mL/Hr) IV Continuous <Continuous>  dextrose 50% Injectable 25 Gram(s) IV Push once  dextrose 50% Injectable 12.5 Gram(s) IV Push once  epoetin reilly (EPOGEN) Injectable 42718 Unit(s) IV Push <User Schedule>  glucagon  Injectable 1 milliGRAM(s) IntraMuscular once  heparin   Injectable 5000 Unit(s) SubCutaneous every 12 hours  insulin lispro (ADMELOG) corrective regimen sliding scale   SubCutaneous every 6 hours  meropenem  IVPB 500 milliGRAM(s) IV Intermittent every 24 hours  sodium chloride 3%  Inhalation 4 milliLiter(s) Inhalation every 12 hours    MEDICATIONS  (PRN):  dextrose Oral Gel 15 Gram(s) Oral once PRN Blood Glucose LESS THAN 70 milliGRAM(s)/deciliter      CAPILLARY BLOOD GLUCOSE      POCT Blood Glucose.: 192 mg/dL (12 May 2024 05:26)  POCT Blood Glucose.: 189 mg/dL (12 May 2024 00:08)  POCT Blood Glucose.: 288 mg/dL (11 May 2024 17:15)  POCT Blood Glucose.: 238 mg/dL (11 May 2024 13:00)    I&O's Summary    11 May 2024 07:01  -  12 May 2024 07:00  --------------------------------------------------------  IN: 400 mL / OUT: 0 mL / NET: 400 mL        PHYSICAL EXAM:  Vital Signs Last 24 Hrs  T(C): 36.2 (12 May 2024 04:00), Max: 37.8 (11 May 2024 16:00)  T(F): 97.2 (12 May 2024 04:00), Max: 100.1 (11 May 2024 16:00)  HR: 82 (12 May 2024 07:00) (42 - 93)  BP: 176/83 (12 May 2024 07:00) (114/58 - 197/85)  BP(mean): 109 (12 May 2024 07:00) (72 - 134)  RR: 23 (12 May 2024 07:00) (22 - 31)  SpO2: 100% (12 May 2024 07:00) (88% - 100%)    Parameters below as of 12 May 2024 07:00  Patient On (Oxygen Delivery Method): nasal cannula  O2 Flow (L/min): 2      CONSTITUTIONAL: NAD  HEET: MMM, EOMI, PERRLA  NECK: supple  RESPIRATORY: crackles   CARDIOVASCULAR: Regular rate and rhythm, normal S1 and S2, no murmur/rub/gallop; No lower extremity edema; Peripheral pulses are 2+ bilaterally  ABDOMEN: Nontender to palpation, normoactive bowel sounds, no rebound/guarding; No hepatosplenomegaly  MUSCULOSKELETAL: no clubbing or cyanosis of digits; no joint swelling or tenderness to palpation  PSYCH: A+O to person, place, and time; affect appropriate  SKIN: No rash    LABS:                        12.4   28.22 )-----------( 403      ( 12 May 2024 03:00 )             37.3     05-12    139  |  92<L>  |  80<H>  ----------------------------<  184<H>  5.3   |  19<L>  |  6.68<H>    Ca    9.8      12 May 2024 03:00  Phos  8.1     05-12  Mg     3.00     05-12    TPro  8.4<H>  /  Alb  3.5  /  TBili  0.5  /  DBili  x   /  AST  17  /  ALT  25  /  AlkPhos  144<H>  05-12    PT/INR - ( 11 May 2024 00:20 )   PT: 13.1 sec;   INR: 1.16 ratio         PTT - ( 11 May 2024 00:20 )  PTT:31.4 sec      Urinalysis Basic - ( 12 May 2024 03:00 )    Color: x / Appearance: x / SG: x / pH: x  Gluc: 184 mg/dL / Ketone: x  / Bili: x / Urobili: x   Blood: x / Protein: x / Nitrite: x   Leuk Esterase: x / RBC: x / WBC x   Sq Epi: x / Non Sq Epi: x / Bacteria: x        Culture - Sputum (collected 10 May 2024 16:45)  Source: .Sputum Sputum  Gram Stain (10 May 2024 22:57):    Rare polymorphonuclear leukocytes per low power field    Rare Squamous epithelial cells per low power field    Rare Gram Negative Rods per oil power field  Preliminary Report (11 May 2024 16:28):    Normal Respiratory Jocelyn present    Culture - Blood (collected 10 May 2024 04:00)  Source: .Blood Blood-Peripheral  Preliminary Report (12 May 2024 07:01):    No growth at 48 Hours    Culture - Blood (collected 10 May 2024 03:45)  Source: .Blood Blood-Peripheral  Gram Stain (10 May 2024 19:34):    Growth in anaerobic bottle: Gram Positive Rods  Final Report (11 May 2024 14:47):    Growth in anaerobic bottle: Bacillus cereus "Susceptibilities not    performed"    Direct identification is available within approximately 3-5    hours either by Blood Panel Multiplexed PCR or Direct    MALDI-TOF. Details: [email or web address]  Organism: Blood Culture PCR (11 May 2024 14:47)  Organism: Blood Culture PCR (11 May 2024 14:47)        Tele Reviewed:    RADIOLOGY & ADDITIONAL TESTS:  Results Reviewed:   Imaging Personally Reviewed:  Electrocardiogram Personally Reviewed:

## 2024-05-12 NOTE — PROGRESS NOTE ADULT - SUBJECTIVE AND OBJECTIVE BOX
Cardiovascular Disease Progress Note  DATE OF SERVICE: 05-12-24 @ 09:17    Overnight events: No acute events overnight.    The patient denies chest pain or SOB.   NGT in place    Objective Findings:  T(C): 36.2 (05-12-24 @ 04:00), Max: 37.8 (05-11-24 @ 16:00)  HR: 82 (05-12-24 @ 07:00) (70 - 93)  BP: 176/83 (05-12-24 @ 07:00) (114/58 - 197/85)  RR: 23 (05-12-24 @ 07:00) (22 - 31)  SpO2: 100% (05-12-24 @ 07:00) (88% - 100%)  Wt(kg): --  Daily     Daily       Physical Exam:  Gen: NAD; Patient resting comfortably  HEENT: EOMI, Normocephalic/ atraumatic  CV: RRR, normal S1 + S2, no m/r/g  Lungs:  Normal respiratory effort; clear to auscultation bilaterally  Abd: soft, non-tender; bowel sounds present  Ext: No edema; warm and well perfused    Telemetry: Sinus    Laboratory Data:                        12.4   28.22 )-----------( 403      ( 12 May 2024 03:00 )             37.3     05-12    139  |  92<L>  |  80<H>  ----------------------------<  184<H>  5.3   |  19<L>  |  6.68<H>    Ca    9.8      12 May 2024 03:00  Phos  8.1     05-12  Mg     3.00     05-12    TPro  8.4<H>  /  Alb  3.5  /  TBili  0.5  /  DBili  x   /  AST  17  /  ALT  25  /  AlkPhos  144<H>  05-12    PT/INR - ( 11 May 2024 00:20 )   PT: 13.1 sec;   INR: 1.16 ratio         PTT - ( 11 May 2024 00:20 )  PTT:31.4 sec          Inpatient Medications:  MEDICATIONS  (STANDING):  amLODIPine   Tablet 5 milliGRAM(s) Oral daily  aspirin  chewable 81 milliGRAM(s) Oral daily  chlorhexidine 2% Cloths 1 Application(s) Topical <User Schedule>  dextrose 10% Bolus 125 milliLiter(s) IV Bolus once  dextrose 5%. 1000 milliLiter(s) (100 mL/Hr) IV Continuous <Continuous>  dextrose 5%. 1000 milliLiter(s) (50 mL/Hr) IV Continuous <Continuous>  dextrose 50% Injectable 25 Gram(s) IV Push once  dextrose 50% Injectable 12.5 Gram(s) IV Push once  epoetin reilly (EPOGEN) Injectable 79669 Unit(s) IV Push <User Schedule>  glucagon  Injectable 1 milliGRAM(s) IntraMuscular once  heparin   Injectable 5000 Unit(s) SubCutaneous every 12 hours  insulin lispro (ADMELOG) corrective regimen sliding scale   SubCutaneous every 6 hours  meropenem  IVPB 500 milliGRAM(s) IV Intermittent every 24 hours  sodium chloride 3%  Inhalation 4 milliLiter(s) Inhalation every 12 hours      Assessment: 73 year old man with HTN, HLD, T2DM on insulin, and ESRD on HD presents with supply demand ischemia and angina.    Plan of Care:    #Type II myocardial infarction-  Secondary to distributive shock earlier on admission. .   ST deviation noted on EKG, however interpretation is limited by LVH with repolarization changes.  The repeat echo shows no new wall motion abnormality.   I would not pursue cath at this time given debility/ ICU myopathy.   Medical management of NSTEMI.    ASA 81 mg daily         #Sinus bradycardia-  No evidence of AV block on admission EKG or telemetry.  No indication for pacing at this time.     #HTN-  Recent shock.  Pressors now off    #ESRD-  RRT in the MICU.     Care discussed with Mr. Art and wife at bedside.       Patient critically ill in the MICU              Over 51 minutes of critical care time spent.  spent on total encounter; more than 50% of the visit was spent counseling and/or coordinating care by the attending physician.      Geovani Bravo MD [facility]  Cardiovascular Disease  [phone]

## 2024-05-12 NOTE — PROGRESS NOTE ADULT - SUBJECTIVE AND OBJECTIVE BOX
Patient is a 73y Male     Patient is a 73y old  Male who presents with a chief complaint of Unstable angina, abn EKG (12 May 2024 09:17)      HPI:  73-year-old male past medical history hypertension, ESRD on dialysis Monday Wednesday Friday, diabetes insulin-dependent presents with hiccups patient endorses persistent hiccups for the last 2 days. found to have peaked T waves, a new finding. denies dizziness, N/V, denies CP, palps, abd pain (29 Apr 2024 21:15)      PAST MEDICAL & SURGICAL HISTORY:  Diabetes      Benign essential HTN      HLD (hyperlipidemia)      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      ESRD on hemodialysis      Arteriovenous fistula          MEDICATIONS  (STANDING):  aspirin  chewable 81 milliGRAM(s) Oral daily  chlorhexidine 2% Cloths 1 Application(s) Topical <User Schedule>  dextrose 10% Bolus 125 milliLiter(s) IV Bolus once  dextrose 5%. 1000 milliLiter(s) (100 mL/Hr) IV Continuous <Continuous>  dextrose 5%. 1000 milliLiter(s) (50 mL/Hr) IV Continuous <Continuous>  dextrose 50% Injectable 25 Gram(s) IV Push once  dextrose 50% Injectable 12.5 Gram(s) IV Push once  epoetin reilly (EPOGEN) Injectable 95477 Unit(s) IV Push <User Schedule>  glucagon  Injectable 1 milliGRAM(s) IntraMuscular once  heparin   Injectable 5000 Unit(s) SubCutaneous every 12 hours  insulin lispro (ADMELOG) corrective regimen sliding scale   SubCutaneous every 6 hours  meropenem  IVPB 500 milliGRAM(s) IV Intermittent every 24 hours  sodium chloride 3%  Inhalation 4 milliLiter(s) Inhalation every 12 hours  sodium zirconium cyclosilicate 10 Gram(s) Oral once      Allergies    No Known Allergies    Intolerances        SOCIAL HISTORY:  Denies ETOh,Smoking,     FAMILY HISTORY:  FHx: diabetes mellitus (Father, Aunt)        REVIEW OF SYSTEMS:    CONSTITUTIONAL: No weakness, fevers or chills  EYES/ENT: No visual changes;  No vertigo or throat pain   NECK: No pain or stiffness  RESPIRATORY: No cough, wheezing, hemoptysis; No shortness of breath  CARDIOVASCULAR: No chest pain or palpitations  GASTROINTESTINAL: No abdominal or epigastric pain. No nausea, vomiting, or hematemesis; No diarrhea or constipation. No melena or hematochezia.  GENITOURINARY: No dysuria, frequency or hematuria  NEUROLOGICAL: No numbness or weakness  SKIN: No itching, burning, rashes, or lesions   All other review of systems is negative unless indicated above.    VITAL:  T(C): , Max: 37.8 (05-11-24 @ 16:00)  T(F): , Max: 100.1 (05-11-24 @ 16:00)  HR: 82 (05-12-24 @ 07:00)  BP: 176/83 (05-12-24 @ 07:00)  BP(mean): 109 (05-12-24 @ 07:00)  RR: 23 (05-12-24 @ 07:00)  SpO2: 100% (05-12-24 @ 07:00)  Wt(kg): --    I and O's:    05-10 @ 07:01  -  05-11 @ 07:00  --------------------------------------------------------  IN: 500 mL / OUT: 1000 mL / NET: -500 mL    05-11 @ 07:01  -  05-12 @ 07:00  --------------------------------------------------------  IN: 400 mL / OUT: 0 mL / NET: 400 mL          PHYSICAL EXAM:    Constitutional: NAD  HEENT: PERRLA,   Neck: No JVD  Respiratory: CTA B/L  Cardiovascular: S1 and S2  Gastrointestinal: BS+, soft, NT/ND  Extremities: No peripheral edema  Neurological: A/O x 3, no focal deficits  Psychiatric: Normal mood, normal affect  : No Abbasi  Skin: No rashes  Access: Not applicable  Back: No CVA tenderness    LABS:                        12.4   28.22 )-----------( 403      ( 12 May 2024 03:00 )             37.3     05-12    139  |  92<L>  |  80<H>  ----------------------------<  184<H>  5.3   |  19<L>  |  6.68<H>    Ca    9.8      12 May 2024 03:00  Phos  8.1     05-12  Mg     3.00     05-12    TPro  8.4<H>  /  Alb  3.5  /  TBili  0.5  /  DBili  x   /  AST  17  /  ALT  25  /  AlkPhos  144<H>  05-12          RADIOLOGY & ADDITIONAL STUDIES:

## 2024-05-12 NOTE — PROGRESS NOTE ADULT - ASSESSMENT
73M w/ PMH of HTN, ESRD (MWF), IDDM, p/f chest pain c/b hiccups for the last 2 days undergoing ischemic evaluation per cardiology. Pt s/p RRT x 2 for AMS. MICU consulted and accepting for airway monitoring in setting of baclofen toxicity +/- stroke.     NEUROLOGY     #Pontine and Cerebellar strokes  - MR from 5/6 showing R pontine and cerebellar infarcts  - unclear timeline but may be inciting event for resp failure  - neurology following  - previously treated with Plavix, heparin, ASA for NSTEMI  - MRA head and neck w/o large vessel occlusion    CARDIOVASCULAR   #Angina  - patient admitted with chest pain and noted to have peaked T waves on admission   - TTE showing EF 72%  - EKG 5/3 demonstrating ST deviation noted on EKG  - Per cardiology, no plan for cath at this time  - s/p DAPT load 5/4  - will c/w ASA 81 mg daily with heparin gtt x 48 hours (now complete)  - repeat TTE (5/4) demonstrating no significant interval changes  - asymptomatic troponin iso ESRD; trend to peak    #HTN  - started on norvasc and will uptitrate anti hypertensive agents     RESPIRATORY   - intubated due to inability to protect airway  - extubated 5/9 c/b weak cough with desatting improved with deep nasal suction and chest PT   - hypophonic and requiring airway clearance     GI/NUTRITION   #Gastroporesis   - hold reglan for now   - will need post extubation bedside swallow eval, possible formal speech and swallow--> pureed diet     /RENAL   #ESRD MWF  - R fem shiley removed 5/2  - placement of IJ shiley 5/3 then removed 5/10 iso positive BCx  - pending fistula study of RUE (failed previous HD session)  - tolerating HD  - post MRI JET HD 5/6 completed, last to be completed today after new shiley placed.   - next HD scheduled 5/13      INFECTIOUS DISEASE   #leukocytosis  - noted on repeat labs  - follow up infectious workup  - 5 day course of zosyn (5/3 - 5/8)  - now with rising WBC iso fever and positive BCx; also with RLQ abdominal pain  - likey aspiration iso recent extubation vs bacteremia  - started on meropenem and vanc (will need to be dosed by level)    ENDOCRINE   #IDD  - NPH 9u q6hrs (hold when NPO)  - cw ISS    HEMATOLOGY/ONCOLOGY  FELIX      DVT PPX  heparin SQ    ETHICS  Full code   73M w/ PMH of HTN, ESRD (MWF), IDDM, p/f chest pain c/b hiccups for the last 2 days undergoing ischemic evaluation per cardiology. Pt s/p RRT x 2 for AMS. MICU consulted and accepting for airway monitoring in setting of baclofen toxicity +/- stroke.     NEUROLOGY     #Pontine and Cerebellar strokes  - MR from 5/6 showing R pontine and cerebellar infarcts  - unclear timeline but may be inciting event for resp failure  - neurology following  - previously treated with Plavix, heparin, ASA for NSTEMI  - MRA head and neck w/o large vessel occlusion    CARDIOVASCULAR   #Angina  - patient admitted with chest pain and noted to have peaked T waves on admission   - TTE showing EF 72%  - EKG 5/3 demonstrating ST deviation noted on EKG  - Per cardiology, no plan for cath at this time  - s/p DAPT load 5/4  - will c/w ASA 81 mg daily with heparin gtt x 48 hours (now complete)  - repeat TTE (5/4) demonstrating no significant interval changes  - asymptomatic troponin iso ESRD; trend to peak    #HTN  - started on norvasc and will uptitrate anti hypertensive agents     RESPIRATORY   - intubated due to inability to protect airway  - extubated 5/9 c/b weak cough with desatting improved with deep nasal suction and chest PT   - hypophonic and requiring airway clearance     GI/NUTRITION   #Gastroporesis   - hold reglan for now   - will need post extubation bedside swallow eval, possible formal speech and swallow    /RENAL   #ESRD MWF  - R fem shiley removed 5/2  - placement of IJ shiley 5/3 then removed 5/10 iso positive BCx  - pending fistula study of RUE (failed previous HD session)  - tolerating HD  - post MRI JET HD 5/6 completed, last to be completed today after new shiley placed.   - next HD scheduled 5/13      INFECTIOUS DISEASE   #leukocytosis  - noted on repeat labs  - follow up infectious workup  - 5 day course of zosyn (5/3 - 5/8)  - now with rising WBC iso fever and positive BCx; also with RLQ abdominal pain  - likey aspiration iso recent extubation vs bacteremia  - started on meropenem and vanc (will need to be dosed by level)  - f/u CT A/P    ENDOCRINE   #IDD  - NPH 9u q6hrs (hold when NPO)  - cw ISS    HEMATOLOGY/ONCOLOGY  FELIX      DVT PPX  heparin SQ    ETHICS  Full code

## 2024-05-12 NOTE — PROCEDURE NOTE - NSTRACHPOSTINTU_RESP_A_CORE
Appropriate capnography/Breath sounds bilateral/Breath sounds equal/Chest excursion noted Breath sounds bilateral/Breath sounds equal/Chest excursion noted/Positive end tidal Co2 noted

## 2024-05-12 NOTE — PROGRESS NOTE ADULT - ASSESSMENT
72 y/o M PMhx HTN, ESRD (on HD M/W/F), DM who presented with hiccups x 2 days and chest pain found to have peaked T waves. Hospital course c/b AMS s/p RRT on 5/1 and 5/2. Vascular consulted 2/2 RUE AVF access unable to be used s/p R femoral shiley placed for emergent HD. Transferred to MICU and intubated 5/2 for airway protection.     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 cereus isolates produce beta-lactamases so penicillins and cephalosporins should not be used for empiric treatment of Bacillus spp infection, vancomycin is generally considered the preferred antibiotic choice pending sensitivities but with possible abd source if rec  Vancomycin IV (by levels) and Meropenem (recent Zosyn) started 5/11.  repeat blood cultures 5/11-NGTD,   GI-PCR, stool culture-not collected   abd/pelvic imaging-formal read pending    2-AMS, CVA  TTE- no evidence of valvular disease  s/p LP 5/2, cell count not consistent w/ bacterial meningitis, CSF PCR- negative  MRI- Punctate foci of restricted diffusion in the left talya and left cerebellum with associated T2 prolongation consistent with acute infarcts  s/p empiric zosyn x 5 days, completed 5/7    3-ESRD  tentative plan for RUE fistulogram when medically optimized    Continues to require ICU level care    Thank you for consulting us and involving us in the management of this most interesting and challenging case.  We will follow along in the care of this patient. Please call us at [phone] or text me directly on my cell# at [phone] with any concerns.    Starting Monday Dr Steven Torres will be covering this patient so please contact him with further questions office [phone] or our answering service at [phone]

## 2024-05-12 NOTE — PROCEDURE NOTE - NSBRONCHPROCDETAILS_GEN_A_CORE_FT
Bronchoscope inserted through ETT. PT started procedure saturating low 90sETT noted to be in proximal trachea advanced to 26cm. Bilateral airways serially inspected. Left side with minimal secretions. Right mainstem bronchus noted with thick mucoid secretions which were therapeutically aspirated. Right sided airways serially inspected and mucous plugs suctioned with aid of saline lavage. BAL performed in superior segment of RLL. At end of procedure sat airways noted to be clear minor scope trauma in RML. No overt bleeding noted. Pt tolerated procedure well and saturation noted to improve to 96%. Bronchoscope then withdrawn from ETT. Bronchoscope inserted through ETT. PT started procedure saturating low 90s ETT noted to be in proximal trachea advanced to 26cm. Bilateral airways serially inspected. Left side with minimal secretions. Right mainstem bronchus noted with thick mucoid secretions which were therapeutically aspirated. Right sided airways serially inspected and mucous plugs suctioned with aid of saline lavage. BAL performed in superior segment of RLL. At end of procedure sat airways noted to be clear minor scope trauma in RML. No overt bleeding noted. Pt tolerated procedure well and saturation noted to improve to 96%. Bronchoscope then withdrawn from ETT.

## 2024-05-13 LAB
ALBUMIN SERPL ELPH-MCNC: 2.5 G/DL — LOW (ref 3.3–5)
ALP SERPL-CCNC: 110 U/L — SIGNIFICANT CHANGE UP (ref 40–120)
ALT FLD-CCNC: 12 U/L — SIGNIFICANT CHANGE UP (ref 4–41)
ANION GAP SERPL CALC-SCNC: 30 MMOL/L — HIGH (ref 7–14)
APTT BLD: 62.3 SEC — HIGH (ref 24.5–35.6)
AST SERPL-CCNC: 9 U/L — SIGNIFICANT CHANGE UP (ref 4–40)
BILIRUB SERPL-MCNC: 0.3 MG/DL — SIGNIFICANT CHANGE UP (ref 0.2–1.2)
BLOOD GAS ARTERIAL COMPREHENSIVE RESULT: SIGNIFICANT CHANGE UP
BUN SERPL-MCNC: 102 MG/DL — HIGH (ref 7–23)
CALCIUM SERPL-MCNC: 8.9 MG/DL — SIGNIFICANT CHANGE UP (ref 8.4–10.5)
CHLORIDE SERPL-SCNC: 93 MMOL/L — LOW (ref 98–107)
CO2 SERPL-SCNC: 14 MMOL/L — LOW (ref 22–31)
CREAT SERPL-MCNC: 8 MG/DL — HIGH (ref 0.5–1.3)
EGFR: 7 ML/MIN/1.73M2 — LOW
GLUCOSE BLDC GLUCOMTR-MCNC: 142 MG/DL — HIGH (ref 70–99)
GLUCOSE BLDC GLUCOMTR-MCNC: 203 MG/DL — HIGH (ref 70–99)
GLUCOSE BLDC GLUCOMTR-MCNC: 212 MG/DL — HIGH (ref 70–99)
GLUCOSE BLDC GLUCOMTR-MCNC: 233 MG/DL — HIGH (ref 70–99)
GLUCOSE SERPL-MCNC: 189 MG/DL — HIGH (ref 70–99)
HCT VFR BLD CALC: 24.8 % — LOW (ref 39–50)
HGB BLD-MCNC: 8.6 G/DL — LOW (ref 13–17)
MAGNESIUM SERPL-MCNC: 2.9 MG/DL — HIGH (ref 1.6–2.6)
MCHC RBC-ENTMCNC: 20.7 PG — LOW (ref 27–34)
MCHC RBC-ENTMCNC: 34.7 GM/DL — SIGNIFICANT CHANGE UP (ref 32–36)
MCV RBC AUTO: 59.6 FL — LOW (ref 80–100)
NIGHT BLUE STAIN TISS: SIGNIFICANT CHANGE UP
NRBC # BLD: 0 /100 WBCS — SIGNIFICANT CHANGE UP (ref 0–0)
NRBC # FLD: 0.05 K/UL — HIGH (ref 0–0)
PHOSPHATE SERPL-MCNC: 8.1 MG/DL — HIGH (ref 2.5–4.5)
PLATELET # BLD AUTO: 385 K/UL — SIGNIFICANT CHANGE UP (ref 150–400)
POTASSIUM SERPL-MCNC: 4.5 MMOL/L — SIGNIFICANT CHANGE UP (ref 3.5–5.3)
POTASSIUM SERPL-SCNC: 4.5 MMOL/L — SIGNIFICANT CHANGE UP (ref 3.5–5.3)
PROT SERPL-MCNC: 5.9 G/DL — LOW (ref 6–8.3)
RBC # BLD: 4.16 M/UL — LOW (ref 4.2–5.8)
RBC # FLD: 18.6 % — HIGH (ref 10.3–14.5)
SODIUM SERPL-SCNC: 137 MMOL/L — SIGNIFICANT CHANGE UP (ref 135–145)
SPECIMEN SOURCE: SIGNIFICANT CHANGE UP
VANCOMYCIN FLD-MCNC: 26.9 UG/ML
WBC # BLD: 21.36 K/UL — HIGH (ref 3.8–10.5)
WBC # FLD AUTO: 21.36 K/UL — HIGH (ref 3.8–10.5)

## 2024-05-13 PROCEDURE — 99291 CRITICAL CARE FIRST HOUR: CPT | Mod: GC,25

## 2024-05-13 PROCEDURE — 76604 US EXAM CHEST: CPT | Mod: 26

## 2024-05-13 PROCEDURE — 71045 X-RAY EXAM CHEST 1 VIEW: CPT | Mod: 26

## 2024-05-13 PROCEDURE — 93308 TTE F-UP OR LMTD: CPT | Mod: 26

## 2024-05-13 PROCEDURE — 36800 INSERTION OF CANNULA: CPT | Mod: GC

## 2024-05-13 RX ORDER — ALTEPLASE 100 MG
2 KIT INTRAVENOUS ONCE
Refills: 0 | Status: COMPLETED | OUTPATIENT
Start: 2024-05-13 | End: 2024-05-13

## 2024-05-13 RX ORDER — CHLORHEXIDINE GLUCONATE 213 G/1000ML
1 SOLUTION TOPICAL
Refills: 0 | Status: DISCONTINUED | OUTPATIENT
Start: 2024-05-13 | End: 2024-05-15

## 2024-05-13 RX ORDER — NOREPINEPHRINE BITARTRATE/D5W 8 MG/250ML
0.05 PLASTIC BAG, INJECTION (ML) INTRAVENOUS
Qty: 8 | Refills: 0 | Status: DISCONTINUED | OUTPATIENT
Start: 2024-05-13 | End: 2024-05-16

## 2024-05-13 RX ORDER — DESMOPRESSIN ACETATE 0.1 MG/1
15 TABLET ORAL ONCE
Refills: 0 | Status: DISCONTINUED | OUTPATIENT
Start: 2024-05-13 | End: 2024-05-13

## 2024-05-13 RX ORDER — DESMOPRESSIN ACETATE 0.1 MG/1
15 TABLET ORAL ONCE
Refills: 0 | Status: COMPLETED | OUTPATIENT
Start: 2024-05-13 | End: 2024-05-13

## 2024-05-13 RX ORDER — SODIUM CHLORIDE 9 MG/ML
10 INJECTION INTRAMUSCULAR; INTRAVENOUS; SUBCUTANEOUS
Refills: 0 | Status: DISCONTINUED | OUTPATIENT
Start: 2024-05-13 | End: 2024-06-14

## 2024-05-13 RX ORDER — ALTEPLASE 100 MG
2 KIT INTRAVENOUS ONCE
Refills: 0 | Status: DISCONTINUED | OUTPATIENT
Start: 2024-05-13 | End: 2024-05-15

## 2024-05-13 RX ORDER — HEPARIN SODIUM 5000 [USP'U]/ML
INJECTION INTRAVENOUS; SUBCUTANEOUS
Qty: 25000 | Refills: 0 | Status: DISCONTINUED | OUTPATIENT
Start: 2024-05-13 | End: 2024-05-14

## 2024-05-13 RX ADMIN — DESMOPRESSIN ACETATE 215 MICROGRAM(S): 0.1 TABLET ORAL at 11:49

## 2024-05-13 RX ADMIN — CHLORHEXIDINE GLUCONATE 15 MILLILITER(S): 213 SOLUTION TOPICAL at 17:23

## 2024-05-13 RX ADMIN — PROPOFOL 6.31 MICROGRAM(S)/KG/MIN: 10 INJECTION, EMULSION INTRAVENOUS at 08:22

## 2024-05-13 RX ADMIN — Medication 81 MILLIGRAM(S): at 11:48

## 2024-05-13 RX ADMIN — Medication 4: at 17:24

## 2024-05-13 RX ADMIN — SEVELAMER CARBONATE 800 MILLIGRAM(S): 2400 POWDER, FOR SUSPENSION ORAL at 05:23

## 2024-05-13 RX ADMIN — SODIUM CHLORIDE 4 MILLILITER(S): 9 INJECTION INTRAMUSCULAR; INTRAVENOUS; SUBCUTANEOUS at 09:47

## 2024-05-13 RX ADMIN — Medication 4: at 23:35

## 2024-05-13 RX ADMIN — Medication 650 MILLIGRAM(S): at 05:24

## 2024-05-13 RX ADMIN — CHLORHEXIDINE GLUCONATE 15 MILLILITER(S): 213 SOLUTION TOPICAL at 05:24

## 2024-05-13 RX ADMIN — MEROPENEM 100 MILLIGRAM(S): 1 INJECTION INTRAVENOUS at 21:35

## 2024-05-13 RX ADMIN — HEPARIN SODIUM 1000 UNIT(S)/HR: 5000 INJECTION INTRAVENOUS; SUBCUTANEOUS at 18:27

## 2024-05-13 RX ADMIN — SEVELAMER CARBONATE 800 MILLIGRAM(S): 2400 POWDER, FOR SUSPENSION ORAL at 13:08

## 2024-05-13 RX ADMIN — Medication 650 MILLIGRAM(S): at 13:08

## 2024-05-13 RX ADMIN — CHLORHEXIDINE GLUCONATE 1 APPLICATION(S): 213 SOLUTION TOPICAL at 05:24

## 2024-05-13 RX ADMIN — SEVELAMER CARBONATE 800 MILLIGRAM(S): 2400 POWDER, FOR SUSPENSION ORAL at 21:35

## 2024-05-13 RX ADMIN — Medication 1 APPLICATION(S): at 18:27

## 2024-05-13 RX ADMIN — SODIUM CHLORIDE 4 MILLILITER(S): 9 INJECTION INTRAMUSCULAR; INTRAVENOUS; SUBCUTANEOUS at 22:22

## 2024-05-13 RX ADMIN — ERYTHROPOIETIN 10000 UNIT(S): 10000 INJECTION, SOLUTION INTRAVENOUS; SUBCUTANEOUS at 20:45

## 2024-05-13 RX ADMIN — Medication 650 MILLIGRAM(S): at 21:35

## 2024-05-13 RX ADMIN — Medication 4: at 05:29

## 2024-05-13 RX ADMIN — HEPARIN SODIUM 1000 UNIT(S)/HR: 5000 INJECTION INTRAVENOUS; SUBCUTANEOUS at 03:29

## 2024-05-13 RX ADMIN — ALTEPLASE 2 MILLIGRAM(S): KIT at 21:09

## 2024-05-13 NOTE — PROGRESS NOTE ADULT - SUBJECTIVE AND OBJECTIVE BOX
Patient is a 73y old  Male who presents with a chief complaint of Unstable angina, abn EKG (13 May 2024 14:54)      SUBJECTIVE / OVERNIGHT EVENTS: ptn is intubated, sedated, on pressors in MICU, trache being planned. on Romaine and Vanc. vanc level is high. shiley placed R IJ for HD to resume. B.cereus bacteremia.     MEDICATIONS  (STANDING):  alteplase for catheter clearance 2 milliGRAM(s) Catheter once  aspirin  chewable 81 milliGRAM(s) Oral daily  chlorhexidine 0.12% Liquid 15 milliLiter(s) Oral Mucosa every 12 hours  chlorhexidine 2% Cloths 1 Application(s) Topical <User Schedule>  chlorhexidine 4% Liquid 1 Application(s) Topical <User Schedule>  dextrose 10% Bolus 125 milliLiter(s) IV Bolus once  dextrose 5%. 1000 milliLiter(s) (100 mL/Hr) IV Continuous <Continuous>  dextrose 5%. 1000 milliLiter(s) (50 mL/Hr) IV Continuous <Continuous>  dextrose 50% Injectable 25 Gram(s) IV Push once  dextrose 50% Injectable 12.5 Gram(s) IV Push once  epoetin reilly (EPOGEN) Injectable 13315 Unit(s) IV Push <User Schedule>  glucagon  Injectable 1 milliGRAM(s) IntraMuscular once  heparin  Infusion.  Unit(s)/Hr (10 mL/Hr) IV Continuous <Continuous>  insulin lispro (ADMELOG) corrective regimen sliding scale   SubCutaneous every 6 hours  meropenem  IVPB 500 milliGRAM(s) IV Intermittent every 24 hours  norepinephrine Infusion 0.05 MICROgram(s)/kG/Min (4.93 mL/Hr) IV Continuous <Continuous>  petrolatum Ophthalmic Ointment 1 Application(s) Both EYES two times a day  propofol Infusion 19.987 MICROgram(s)/kG/Min (6.31 mL/Hr) IV Continuous <Continuous>  sevelamer carbonate 800 milliGRAM(s) Oral every 8 hours  sodium bicarbonate 650 milliGRAM(s) Oral every 8 hours  sodium chloride 3%  Inhalation 4 milliLiter(s) Inhalation every 12 hours    MEDICATIONS  (PRN):  dextrose Oral Gel 15 Gram(s) Oral once PRN Blood Glucose LESS THAN 70 milliGRAM(s)/deciliter  sodium chloride 0.9% lock flush 10 milliLiter(s) IV Push every 1 hour PRN Pre/post blood products, medications, blood draw, and to maintain line patency      Vital Signs Last 24 Hrs  T(F): 97 (05-13-24 @ 20:55), Max: 98.5 (05-13-24 @ 00:00)  HR: 77 (05-13-24 @ 22:26) (54 - 91)  BP: 119/50 (05-13-24 @ 21:00) (84/36 - 168/36)  RR: 20 (05-13-24 @ 21:00) (18 - 24)  SpO2: 99% (05-13-24 @ 22:26) (94% - 100%)  Telemetry:   CAPILLARY BLOOD GLUCOSE      POCT Blood Glucose.: 212 mg/dL (13 May 2024 17:19)  POCT Blood Glucose.: 142 mg/dL (13 May 2024 11:38)  POCT Blood Glucose.: 203 mg/dL (13 May 2024 05:27)  POCT Blood Glucose.: 184 mg/dL (12 May 2024 23:36)    I&O's Summary    12 May 2024 07:01  -  13 May 2024 07:00  --------------------------------------------------------  IN: 657.1 mL / OUT: 0 mL / NET: 657.1 mL    13 May 2024 07:01  -  13 May 2024 22:29  --------------------------------------------------------  IN: 1323.4 mL / OUT: 800 mL / NET: 523.4 mL        PHYSICAL EXAM:  GENERAL: NAD, well-developed  HEAD:  Atraumatic, Normocephalic  EYES: EOMI, PERRLA, conjunctiva and sclera clear  NECK: Supple, No JVD  CHEST/LUNG: Clear to auscultation bilaterally; No wheeze  HEART: Regular rate and rhythm; No murmurs, rubs, or gallops  ABDOMEN: Soft, Nontender, Nondistended; Bowel sounds present  EXTREMITIES:  2+ Peripheral Pulses, No clubbing, cyanosis, or edema  PSYCH: AAOx3  NEUROLOGY: non-focal  SKIN: No rashes or lesions    LABS:                        8.6    21.36 )-----------( 385      ( 13 May 2024 02:40 )             24.8     05-13    137  |  93<L>  |  102<H>  ----------------------------<  189<H>  4.5   |  14<L>  |  8.00<H>    Ca    8.9      13 May 2024 02:40  Phos  8.1     05-13  Mg     2.90     05-13    TPro  5.9<L>  /  Alb  2.5<L>  /  TBili  0.3  /  DBili  x   /  AST  9   /  ALT  12  /  AlkPhos  110  05-13    PT/INR - ( 12 May 2024 20:28 )   PT: 12.7 sec;   INR: 1.13 ratio         PTT - ( 13 May 2024 02:40 )  PTT:62.3 sec      Urinalysis Basic - ( 13 May 2024 02:40 )    Color: x / Appearance: x / SG: x / pH: x  Gluc: 189 mg/dL / Ketone: x  / Bili: x / Urobili: x   Blood: x / Protein: x / Nitrite: x   Leuk Esterase: x / RBC: x / WBC x   Sq Epi: x / Non Sq Epi: x / Bacteria: x        RADIOLOGY & ADDITIONAL TESTS:    Imaging Personally Reviewed:    Consultant(s) Notes Reviewed:      Care Discussed with Consultants/Other Providers:

## 2024-05-13 NOTE — PROGRESS NOTE ADULT - ASSESSMENT
73-year-old male past medical history hypertension, ESRD on dialysis Monday Wednesday Friday, diabetes insulin-dependent presents with hiccups patient endorses persistent hiccups for the last 2 days.   found to have peaked T waves, a new finding. denied dizziness, N/V, denies CP, palps, abd pain  Upon admission seen by CArd, renal and GI  found to have a distended GB prob 2/2 gastroparesis, was started on Reglan  has received 4 doses of baclofen since 4/30 2/2 hiccups, last dose on 5/1 at 5 am  had received Haldol for agitation at 11 pm on 4/30, AMS observed in pm of 5/1  AMS ongoing, RRT x 2 called  now transferred to MICU for encephalopathy requiring  airway protection on 5/2,  intubated for airway protection, was on  propofol and pressors. now off   HD was not done timely until femoral shiley was placed 2/2 clotted RUE AVF. now removed and RIJ shiley placed on 5/3  has been nonverbal since pm 5/1.     MR brain 5/6 c/w L pontine and L cerebellar acute infarcts, no hemorrhage . as per neuro: embolic in nature.   encephalopathy probably 2/2 acute CVA, now follows commands appropriately off sedation  no SZ focus on EEG,   off CRRT,  HD resumed as per renal.   remains intubated,   off keppra  off AC, off pressors, off CRRT, on HD as per renal,   completed 5 days of empiric ZOSYN on 5/7  toxicology consulted upon dx of encephalopathy, not impressed AMS 2/2 Haldol nor baclofen . AMS was 2/2 acute CVA   afebrile, off pressors, shock resolved  remains intubated for airway protection,   leukocytosis resolved  EKG changes on 5/3 c/w NSTEMI loaded w DAPT , was  on Heparin drip x 48 hrs. rpt TTE is unchanged  ID, Neuro , renal, cardiology following.  esrd, had missed HD prior to AMS 2/2 clotted RUE AVF. fistulogram when medically stable. vascular following   HD via RIght IJ Shiley.   NGT in place in stomach.   LP done, no sign of meningitis.         NEUROLOGY     - CTH without acute findings   - LP done, no acute findings  - MR brain w acute infarcts: L talya and L cerebellum, nonhemorrhagic  - no Sz focus on EEG    CARDIOVASCULAR     - ptn never had CP. just peaked T waves. was awaiting ischemic study w nucl stress test  - TTE showing EF 72%, rpt unchanged  - EKG changes on 5/3, loaded w DAPT now on Heparin drip      GI  - on NGT feeds while sedated, intubated  - Gastroparesis       RENAL   - s/p CRRT in MICU, now off, HD as per renal  - via R IJ kolby. R fem removed  - scheduled for fistula study of RUE  on 5/16 w Dr. Lockett      INFECTIOUS DISEASE     - 5/9: s/p extubation, post extubation required deep suction, then on HFNC, Tmax 101.4, getting HD, s/p a single 500 mg dose of Meropenem now on IV VAnco. ID notified. reintubated 2/2 resp failure and sepsis on 5/11  - 5/13: ptn is intubated, sedated, on pressors in MICU, trache being planned. on Romaine and Vanc. vanc level is high. kolby placed R IJ for HD to resume. B.cereus bacteremia.            ENDOCRINE   - DM  - cw ISS    GOC:  Full code

## 2024-05-13 NOTE — PROCEDURE NOTE - NSPOSTPRCRAD_GEN_A_CORE
pending central line located in the superior vena cava/no pneumothorax/post-procedure radiography performed

## 2024-05-13 NOTE — PROGRESS NOTE ADULT - ASSESSMENT
73M w/ PMH of HTN, ESRD (MWF), IDDM, p/f chest pain c/b hiccups for the last 2 days undergoing ischemic evaluation per cardiology. Pt s/p RRT x 2 for AMS. MICU consulted and accepting for airway monitoring in setting of baclofen toxicity +/- stroke.     NEUROLOGY     #Pontine and Cerebellar strokes  - MR from 5/6 showing R pontine and cerebellar infarcts  - unclear timeline but may be inciting event for resp failure  - neurology following  - previously treated with Plavix, heparin, ASA for NSTEMI  - MRA head and neck w/o large vessel occlusion    CARDIOVASCULAR   #Angina  - patient admitted with chest pain and noted to have peaked T waves on admission   - TTE showing EF 72%  - EKG 5/3 demonstrating ST deviation noted on EKG  - Per cardiology, no plan for cath at this time  - s/p DAPT load 5/4  - will c/w ASA 81 mg daily with heparin gtt x 48 hours (now complete)  - repeat TTE (5/4) demonstrating no significant interval changes  - asymptomatic troponin iso ESRD; trend to peak    #HTN  - started on norvasc and will uptitrate anti hypertensive agents     RESPIRATORY   - intubated due to inability to protect airway  - extubated 5/9 c/b weak cough with desatting improved with deep nasal suction and chest PT   - hypophonic and requiring airway clearance     GI/NUTRITION   #Gastroporesis   - hold reglan for now   - will need post extubation bedside swallow eval, possible formal speech and swallow    /RENAL   #ESRD MWF  - R fem shiley removed 5/2  - placement of IJ shiley 5/3 then removed 5/10 iso positive BCx  - pending fistula study of RUE (failed previous HD session)  - tolerating HD  - post MRI JET HD 5/6 completed, last to be completed today after new shiley placed.   - next HD scheduled 5/13      INFECTIOUS DISEASE   #leukocytosis  - noted on repeat labs  - follow up infectious workup  - 5 day course of zosyn (5/3 - 5/8)  - now with rising WBC iso fever and positive BCx; also with RLQ abdominal pain  - likey aspiration iso recent extubation vs bacteremia  - started on meropenem and vanc (will need to be dosed by level)  - f/u CT A/P    ENDOCRINE   #IDD  - NPH 9u q6hrs (hold when NPO)  - cw ISS    HEMATOLOGY/ONCOLOGY  FELIX      DVT PPX  heparin SQ    ETHICS  Full code   73M w/ PMH of HTN, ESRD (MWF), IDDM, p/f chest pain c/b hiccups for the last 2 days undergoing ischemic evaluation per cardiology. Pt s/p RRT x 2 for AMS. MICU consulted and accepting for airway monitoring in setting of baclofen toxicity +/- stroke now pending trach.     NEUROLOGY     #Pontine and Cerebellar strokes  - MR from 5/6 showing R pontine and cerebellar infarcts  - unclear timeline but may be inciting event for resp failure  - neurology following  - previously treated with Plavix, heparin, ASA for NSTEMI  - MRA head and neck w/o large vessel occlusion    CARDIOVASCULAR   #Angina  - patient admitted with chest pain and noted to have peaked T waves on admission   - TTE showing EF 72%  - EKG 5/3 demonstrating ST deviation noted on EKG  - Per cardiology, no plan for cath at this time  - s/p DAPT load 5/4  - will c/w ASA 81 mg daily with heparin gtt x 48 hours (now complete)  - repeat TTE (5/4) demonstrating no significant interval changes  - asymptomatic troponin iso ESRD    #HTN  - holding home medication iso hypotension    RESPIRATORY   - intubated due to inability to protect airway  - extubated 5/9 c/b weak cough with desatting improved with deep nasal suction and chest PT   - hypophonic and requiring airway clearance   - hypoxic and poor cough; reintubated on 5/12 for airway protection and hypoxia.     GI/NUTRITION   #Gastroporesis   - hold reglan for now   - failed speech and swallow after extubation    /RENAL   #ESRD MWF  - R fem shiley removed 5/2  - placement of IJ shiley 5/3 then removed 5/10 iso positive BCx  - pending fistula study of RUE (failed previous HD session)  - tolerating HD  - post MRI JET HD completed (2 sessions)  - new HD IJ catheter placed 5/13 with HD scheduled 5/13      INFECTIOUS DISEASE   #leukocytosis  - noted on repeat labs  - 5 day course of zosyn (5/3 - 5/8)  - now with rising WBC iso fever and positive BCx (B. Cereus; 1/2 with subsequent BCx negative at 24 hours); also with RLQ abdominal pain and CT abd showing R common iliac thrombus.   - started on meropenem and vanc (will need to be dosed by level)  - f/u pre-HD vanc level  - appreciate ID reccs.     ENDOCRINE   #IDDM  - cw ISS    HEMATOLOGY/ONCOLOGY  #Common illiac vein thrombus  - heparin gtt    ETHICS  Full code

## 2024-05-13 NOTE — PROGRESS NOTE ADULT - SUBJECTIVE AND OBJECTIVE BOX
Patient is a 73y Male     Patient is a 73y old  Male who presents with a chief complaint of Unstable angina, abn EKG (13 May 2024 08:04)      HPI:  73-year-old male past medical history hypertension, ESRD on dialysis Monday Wednesday Friday, diabetes insulin-dependent presents with hiccups patient endorses persistent hiccups for the last 2 days. found to have peaked T waves, a new finding. denies dizziness, N/V, denies CP, palps, abd pain (29 Apr 2024 21:15)      PAST MEDICAL & SURGICAL HISTORY:  Diabetes      Benign essential HTN      HLD (hyperlipidemia)      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      ESRD on hemodialysis      Arteriovenous fistula          MEDICATIONS  (STANDING):  aspirin  chewable 81 milliGRAM(s) Oral daily  chlorhexidine 0.12% Liquid 15 milliLiter(s) Oral Mucosa every 12 hours  chlorhexidine 2% Cloths 1 Application(s) Topical <User Schedule>  dextrose 10% Bolus 125 milliLiter(s) IV Bolus once  dextrose 5%. 1000 milliLiter(s) (100 mL/Hr) IV Continuous <Continuous>  dextrose 5%. 1000 milliLiter(s) (50 mL/Hr) IV Continuous <Continuous>  dextrose 50% Injectable 25 Gram(s) IV Push once  dextrose 50% Injectable 12.5 Gram(s) IV Push once  epoetin reilly (EPOGEN) Injectable 33252 Unit(s) IV Push <User Schedule>  glucagon  Injectable 1 milliGRAM(s) IntraMuscular once  heparin  Infusion.  Unit(s)/Hr (10 mL/Hr) IV Continuous <Continuous>  insulin lispro (ADMELOG) corrective regimen sliding scale   SubCutaneous every 6 hours  meropenem  IVPB 500 milliGRAM(s) IV Intermittent every 24 hours  norepinephrine Infusion 0.05 MICROgram(s)/kG/Min (4.93 mL/Hr) IV Continuous <Continuous>  propofol Infusion 19.987 MICROgram(s)/kG/Min (6.31 mL/Hr) IV Continuous <Continuous>  sevelamer carbonate 800 milliGRAM(s) Oral every 8 hours  sodium bicarbonate 650 milliGRAM(s) Oral every 8 hours  sodium chloride 3%  Inhalation 4 milliLiter(s) Inhalation every 12 hours      Allergies    No Known Allergies    Intolerances        SOCIAL HISTORY:  Denies ETOh,Smoking,     FAMILY HISTORY:  FHx: diabetes mellitus (Father, Aunt)        REVIEW OF SYSTEMS:    CONSTITUTIONAL: No weakness, fevers or chills  EYES/ENT: No visual changes;  No vertigo or throat pain   NECK: No pain or stiffness  RESPIRATORY: No cough, wheezing, hemoptysis; No shortness of breath  CARDIOVASCULAR: No chest pain or palpitations  GASTROINTESTINAL: No abdominal or epigastric pain. No nausea, vomiting, or hematemesis; No diarrhea or constipation. No melena or hematochezia.  GENITOURINARY: No dysuria, frequency or hematuria  NEUROLOGICAL: No numbness or weakness  SKIN: No itching, burning, rashes, or lesions   All other review of systems is negative unless indicated above.    VITAL:  T(C): , Max: 37.9 (05-12-24 @ 20:00)  T(F): , Max: 100.2 (05-12-24 @ 20:00)  HR: 59 (05-13-24 @ 07:48)  BP: 119/23 (05-13-24 @ 07:00)  BP(mean): 51 (05-13-24 @ 07:00)  RR: 20 (05-13-24 @ 07:00)  SpO2: 100% (05-13-24 @ 07:48)  Wt(kg): --    I and O's:    05-11 @ 07:01  -  05-12 @ 07:00  --------------------------------------------------------  IN: 400 mL / OUT: 0 mL / NET: 400 mL    05-12 @ 07:01  -  05-13 @ 07:00  --------------------------------------------------------  IN: 657.1 mL / OUT: 0 mL / NET: 657.1 mL          PHYSICAL EXAM:    Constitutional: NAD  HEENT: PERRLA,   Neck: No JVD  Respiratory: CTA B/L  Cardiovascular: S1 and S2  Gastrointestinal: BS+, soft, NT/ND  Extremities: No peripheral edema  Neurological: A/O x 3, no focal deficits  Psychiatric: Normal mood, normal affect  : No Abbasi  Skin: No rashes  Access: Not applicable  Back: No CVA tenderness    LABS:                        8.6    21.36 )-----------( 385      ( 13 May 2024 02:40 )             24.8     05-13    137  |  93<L>  |  102<H>  ----------------------------<  189<H>  4.5   |  14<L>  |  8.00<H>    Ca    8.9      13 May 2024 02:40  Phos  8.1     05-13  Mg     2.90     05-13    TPro  5.9<L>  /  Alb  2.5<L>  /  TBili  0.3  /  DBili  x   /  AST  9   /  ALT  12  /  AlkPhos  110  05-13          RADIOLOGY & ADDITIONAL STUDIES:

## 2024-05-13 NOTE — PROGRESS NOTE ADULT - SUBJECTIVE AND OBJECTIVE BOX
Vascular Surgery Progress Note     Subjective:  Patient seen and examined on AM rounds. Reintubated 5/12 for hypoxic resp failure, bronch w/ mucus plug evacuation. Patient sedated w/ propofol, on levophed.    Vital Signs:  Vital Signs Last 24 Hrs  T(C): 36.1 (13 May 2024 04:00), Max: 37.9 (12 May 2024 20:00)  T(F): 97 (13 May 2024 04:00), Max: 100.2 (12 May 2024 20:00)  HR: 59 (13 May 2024 07:48) (56 - 98)  BP: 119/23 (13 May 2024 07:00) (66/55 - 179/22)  BP(mean): 51 (13 May 2024 07:00) (33 - 143)  RR: 20 (13 May 2024 07:00) (13 - 29)  SpO2: 100% (13 May 2024 07:48) (81% - 100%)    Parameters below as of 13 May 2024 07:00  Patient On (Oxygen Delivery Method): ventilator    O2 Concentration (%): 40    CAPILLARY BLOOD GLUCOSE      POCT Blood Glucose.: 203 mg/dL (13 May 2024 05:27)  POCT Blood Glucose.: 184 mg/dL (12 May 2024 23:36)  POCT Blood Glucose.: 224 mg/dL (12 May 2024 17:18)  POCT Blood Glucose.: 214 mg/dL (12 May 2024 11:20)      I&O's Detail    12 May 2024 07:01  -  13 May 2024 07:00  --------------------------------------------------------  IN:    Heparin Infusion: 110 mL    IV PiggyBack: 100 mL    Norepinephrine: 9.9 mL    Norepinephrine: 236.6 mL    Propofol: 200.6 mL  Total IN: 657.1 mL    OUT:  Total OUT: 0 mL    Total NET: 657.1 mL            Physical Exam:  General: sedated on propofol  HEENT: Normocephalic atraumatic  Respiratory: intubated on vent  Cardio: regular rate  Vascular: extremities are warm and well perfused, palpable b/l radial pulses, R AVF w/ faint palpable thrill        Labs:    05-13    137  |  93<L>  |  102<H>  ----------------------------<  189<H>  4.5   |  14<L>  |  8.00<H>    Ca    8.9      13 May 2024 02:40  Phos  8.1     05-13  Mg     2.90     05-13    TPro  5.9<L>  /  Alb  2.5<L>  /  TBili  0.3  /  DBili  x   /  AST  9   /  ALT  12  /  AlkPhos  110  05-13    LIVER FUNCTIONS - ( 13 May 2024 02:40 )  Alb: 2.5 g/dL / Pro: 5.9 g/dL / ALK PHOS: 110 U/L / ALT: 12 U/L / AST: 9 U/L / GGT: x                                 8.6    21.36 )-----------( 385      ( 13 May 2024 02:40 )             24.8     PT/INR - ( 12 May 2024 20:28 )   PT: 12.7 sec;   INR: 1.13 ratio         PTT - ( 13 May 2024 02:40 )  PTT:62.3 sec

## 2024-05-13 NOTE — PROGRESS NOTE ADULT - SUBJECTIVE AND OBJECTIVE BOX
[facility], Division of Infectious Diseases  AUGUST Carbajal Y. Patel, S. Shah, G. Brian  [phone]  ([phone] - weekdays after 5pm and weekends)    Name: JIMMY BLAND  Age/Gender: 73y Male  MRN: [id number]    Interval History:  Notes reviewed.   intubated in ICU, on pressors    Allergies: No Known Allergies      Objective:  Vitals:   T(F): 96.1 (05-13-24 @ 12:00), Max: 100.2 (05-12-24 @ 20:00)  HR: 59 (05-13-24 @ 13:00) (54 - 97)  BP: 127/38 (05-13-24 @ 13:00) (82/21 - 179/22)  RR: 18 (05-13-24 @ 13:00) (18 - 24)  SpO2: 99% (05-13-24 @ 13:00) (99% - 100%)  Physical Examination:  General: no acute distress  HEENT: anicteric, ETT  Lungs: clear to auscultation anteriorly  Heart: S1, S2, normal rate  Abdomen: Soft. Nondistended.  Extremities: No LE edema.   Skin: Warm. Dry.     Laboratory Studies:  CBC:                       8.6    21.36 )-----------( 385      ( 13 May 2024 02:40 )             24.8     WBC Trend:  21.36 05-13-24 @ 02:40  31.30 05-12-24 @ 20:28  28.22 05-12-24 @ 03:00  27.31 05-11-24 @ 00:20  14.29 05-10-24 @ 00:50  12.78 05-09-24 @ 23:57  9.43 05-09-24 @ 00:50  8.14 05-08-24 @ 01:33  12.10 05-07-24 @ 00:30    CMP: 05-13    137  |  93<L>  |  102<H>  ----------------------------<  189<H>  4.5   |  14<L>  |  8.00<H>    Ca    8.9      13 May 2024 02:40  Phos  8.1     05-13  Mg     2.90     05-13    TPro  5.9<L>  /  Alb  2.5<L>  /  TBili  0.3  /  DBili  x   /  AST  9   /  ALT  12  /  AlkPhos  110  05-13      LIVER FUNCTIONS - ( 13 May 2024 02:40 )  Alb: 2.5 g/dL / Pro: 5.9 g/dL / ALK PHOS: 110 U/L / ALT: 12 U/L / AST: 9 U/L / GGT: x           Vancomycin Level, Random: 26.9 ug/mL (05-13-24 @ 13:50)  Vancomycin Level, Random: 39.0 ug/mL (05-12-24 @ 03:00)  Vancomycin Level, Trough: 14.9 ug/mL (05-10-24 @ 21:00)    Urinalysis Basic - ( 13 May 2024 02:40 )    Color: x / Appearance: x / SG: x / pH: x  Gluc: 189 mg/dL / Ketone: x  / Bili: x / Urobili: x   Blood: x / Protein: x / Nitrite: x   Leuk Esterase: x / RBC: x / WBC x   Sq Epi: x / Non Sq Epi: x / Bacteria: x      Microbiology: reviewed     Culture - Acid Fast - Bronchial w/Smear (collected 05-12-24 @ 15:06)  Source: .Bronchial Bronchial Lavage    Culture - Bronchial (collected 05-12-24 @ 15:06)  Source: .Bronchial Bronchial Lavage  Gram Stain (05-12-24 @ 22:40):    Moderate polymorphonuclear leukocytes per low power field    Rare Squamous epithelial cells per low power field    Rare Gram Positive Cocci in Clusters per oil power field  Preliminary Report (05-13-24 @ 14:50):    Normal Respiratory Jocelyn present    Culture - Fungal, Bronchial (collected 05-12-24 @ 15:06)  Source: .Bronchial Bronchial Lavage  Preliminary Report (05-13-24 @ 08:55):    Testing in progress    Culture - Blood (collected 05-11-24 @ 14:00)  Source: .Blood Blood-Peripheral  Preliminary Report (05-12-24 @ 19:02):    No growth at 24 hours    Culture - Blood (collected 05-11-24 @ 14:00)  Source: .Blood Blood-Peripheral  Preliminary Report (05-12-24 @ 19:02):    No growth at 24 hours    Culture - Sputum (collected 05-10-24 @ 16:45)  Source: .Sputum Sputum  Gram Stain (05-10-24 @ 22:57):    Rare polymorphonuclear leukocytes per low power field    Rare Squamous epithelial cells per low power field    Rare Gram Negative Rods per oil power field  Final Report (05-12-24 @ 16:49):    Normal Respiratory Jocelyn present    Culture - Blood (collected 05-10-24 @ 04:00)  Source: .Blood Blood-Peripheral  Preliminary Report (05-13-24 @ 07:01):    No growth at 72 Hours    Culture - Blood (collected 05-10-24 @ 03:45)  Source: .Blood Blood-Peripheral  Gram Stain (05-10-24 @ 19:34):    Growth in anaerobic bottle: Gram Positive Rods  Final Report (05-11-24 @ 14:47):    Growth in anaerobic bottle: Bacillus cereus "Susceptibilities not    performed"    Direct identification is available within approximately 3-5    hours either by Blood Panel Multiplexed PCR or Direct    MALDI-TOF. Details: [email or web address]  Organism: Blood Culture PCR (05-11-24 @ 14:47)  Organism: Blood Culture PCR (05-11-24 @ 14:47)      Method Type: PCR      -  Blood PCR Panel: NEG    Culture - Sputum (collected 05-08-24 @ 10:05)  Source: ET Tube ET Tube  Gram Stain (05-08-24 @ 20:37):    Few polymorphonuclear leukocytes per low power field    No Squamous epithelial cells per low power field    No organisms seen per oil power field  Final Report (05-10-24 @ 11:45):    Normal Respiratory Jocelyn present    Culture - Sputum (collected 05-04-24 @ 04:10)  Source: ET Tube ET Tube  Gram Stain (05-04-24 @ 13:40):    Few polymorphonuclear leukocytes seen per low power field    No Squamous epithelial cells per low power field    Numerous Gram Positive Rods seen per oil power field    Rare Gram Positive Cocci in Clusters seen per oil power field  Final Report (05-05-24 @ 17:14):    Normal Respiratory Jocelyn present    Culture - Fungal, CSF (collected 05-02-24 @ 12:15)  Source: .CSF CSF lumbar  Preliminary Report (05-11-24 @ 23:02):    No fungus isolated at 1 week.    Culture - CSF with Gram Stain (collected 05-02-24 @ 12:15)  Source: .CSF CSF lumbar  Gram Stain (05-02-24 @ 13:51):    polymorphonuclear leukocytes    No organisms seen    by cytocentrifuge  Final Report (05-07-24 @ 08:05):    No growth at 5 days    Culture - Blood (collected 05-02-24 @ 03:45)  Source: .Blood Blood-Peripheral  Final Report (05-07-24 @ 10:01):    No growth at 5 days    Culture - Blood (collected 05-02-24 @ 03:15)  Source: .Blood Blood-Venous  Final Report (05-07-24 @ 10:01):    No growth at 5 days        Radiology: reviewed     Medications:  aspirin  chewable 81 milliGRAM(s) Oral daily  chlorhexidine 0.12% Liquid 15 milliLiter(s) Oral Mucosa every 12 hours  chlorhexidine 2% Cloths 1 Application(s) Topical <User Schedule>  chlorhexidine 4% Liquid 1 Application(s) Topical <User Schedule>  dextrose 10% Bolus 125 milliLiter(s) IV Bolus once  dextrose 5%. 1000 milliLiter(s) IV Continuous <Continuous>  dextrose 5%. 1000 milliLiter(s) IV Continuous <Continuous>  dextrose 50% Injectable 25 Gram(s) IV Push once  dextrose 50% Injectable 12.5 Gram(s) IV Push once  dextrose Oral Gel 15 Gram(s) Oral once PRN  epoetin reilly (EPOGEN) Injectable 04726 Unit(s) IV Push <User Schedule>  glucagon  Injectable 1 milliGRAM(s) IntraMuscular once  heparin  Infusion.  Unit(s)/Hr IV Continuous <Continuous>  insulin lispro (ADMELOG) corrective regimen sliding scale   SubCutaneous every 6 hours  meropenem  IVPB 500 milliGRAM(s) IV Intermittent every 24 hours  norepinephrine Infusion 0.05 MICROgram(s)/kG/Min IV Continuous <Continuous>  petrolatum Ophthalmic Ointment 1 Application(s) Both EYES two times a day  propofol Infusion 19.987 MICROgram(s)/kG/Min IV Continuous <Continuous>  sevelamer carbonate 800 milliGRAM(s) Oral every 8 hours  sodium bicarbonate 650 milliGRAM(s) Oral every 8 hours  sodium chloride 0.9% lock flush 10 milliLiter(s) IV Push every 1 hour PRN  sodium chloride 3%  Inhalation 4 milliLiter(s) Inhalation every 12 hours    Antimicrobials:  meropenem  IVPB 500 milliGRAM(s) IV Intermittent every 24 hours

## 2024-05-13 NOTE — PROGRESS NOTE ADULT - SUBJECTIVE AND OBJECTIVE BOX
Cardiovascular Disease Progress Note  DATE OF SERVICE: 24 @ 08:06       The patient is re-intubated and sedated.        Objective Findings:  T(C): 36.1 (24 @ 04:00), Max: 37.9 (24 @ 20:00)  HR: 59 (24 @ 07:48) (56 - 98)  BP: 119/23 (24 @ 07:00) (66/55 - 179/22)  RR: 20 (24 @ 07:00) (13 - 29)  SpO2: 100% (24 @ 07:48) (81% - 100%)  Wt(kg): --  Daily     Daily Weight in k.2 (13 May 2024 04:00)      Physical Exam:  Gen: NAD; Patient resting comfortably  HEENT:  Normocephalic/ atraumatic  CV: RRR, normal S1 + S2, no m/r/g  Lungs:  Normal respiratory effort; mechanical ventilation via ETT  Abd: soft, non-tender; bowel sounds present  Ext: No edema; warm and well perfused    Telemetry: Sinus    Laboratory Data:                        8.6    21.36 )-----------( 385      ( 13 May 2024 02:40 )             24.8         137  |  93<L>  |  102<H>  ----------------------------<  189<H>  4.5   |  14<L>  |  8.00<H>    Ca    8.9      13 May 2024 02:40  Phos  8.1       Mg     2.90         TPro  5.9<L>  /  Alb  2.5<L>  /  TBili  0.3  /  DBili  x   /  AST  9   /  ALT  12  /  AlkPhos  110      PT/INR - ( 12 May 2024 20:28 )   PT: 12.7 sec;   INR: 1.13 ratio         PTT - ( 13 May 2024 02:40 )  PTT:62.3 sec          Inpatient Medications:  MEDICATIONS  (STANDING):  aspirin  chewable 81 milliGRAM(s) Oral daily  chlorhexidine 0.12% Liquid 15 milliLiter(s) Oral Mucosa every 12 hours  chlorhexidine 2% Cloths 1 Application(s) Topical <User Schedule>  dextrose 10% Bolus 125 milliLiter(s) IV Bolus once  dextrose 5%. 1000 milliLiter(s) (100 mL/Hr) IV Continuous <Continuous>  dextrose 5%. 1000 milliLiter(s) (50 mL/Hr) IV Continuous <Continuous>  dextrose 50% Injectable 25 Gram(s) IV Push once  dextrose 50% Injectable 12.5 Gram(s) IV Push once  epoetin reilly (EPOGEN) Injectable 90912 Unit(s) IV Push <User Schedule>  glucagon  Injectable 1 milliGRAM(s) IntraMuscular once  heparin  Infusion.  Unit(s)/Hr (10 mL/Hr) IV Continuous <Continuous>  insulin lispro (ADMELOG) corrective regimen sliding scale   SubCutaneous every 6 hours  meropenem  IVPB 500 milliGRAM(s) IV Intermittent every 24 hours  norepinephrine Infusion 0.05 MICROgram(s)/kG/Min (4.93 mL/Hr) IV Continuous <Continuous>  propofol Infusion 19.987 MICROgram(s)/kG/Min (6.31 mL/Hr) IV Continuous <Continuous>  sevelamer carbonate 800 milliGRAM(s) Oral every 8 hours  sodium bicarbonate 650 milliGRAM(s) Oral every 8 hours  sodium chloride 3%  Inhalation 4 milliLiter(s) Inhalation every 12 hours      Assessment: 73 year old man with HTN, HLD, T2DM on insulin, and ESRD on HD presents with supply demand ischemia and angina.    Plan of Care:    #Type II myocardial infarction-  Secondary to distributive shock earlier on admission. .   ST deviation noted on EKG, however interpretation is limited by LVH with repolarization changes.  The repeat echo shows no new wall motion abnormality.   I would not pursue cath at this time given recurrent aspiration and respiratory failure.   Medical management of NSTEMI.    ASA 81 mg daily         #Sinus bradycardia-  No evidence of AV block on admission EKG or telemetry.  No indication for pacing at this time.     #HTN-  Recent shock.  Pressors now off    #ESRD-  RRT in the MICU.         Patient critically ill in the MICU.      Over 45 minutes of critical care time spent on total encounter; more than 50% of the visit was spent counseling and/or coordinating care by the attending physician.      Geovani Bravo MD [facility]  Cardiovascular Disease  [phone]

## 2024-05-13 NOTE — PROGRESS NOTE ADULT - SUBJECTIVE AND OBJECTIVE BOX
INTERVAL HPI/OVERNIGHT EVENTS:    SUBJECTIVE: Patient seen and examined at bedside. Intubated, sedated, ROS cannot be obtained.       VITAL SIGNS:  ICU Vital Signs Last 24 Hrs  T(C): 36.1 (13 May 2024 04:00), Max: 37.9 (12 May 2024 20:00)  T(F): 97 (13 May 2024 04:00), Max: 100.2 (12 May 2024 20:00)  HR: 56 (13 May 2024 06:00) (56 - 98)  BP: 122/21 (13 May 2024 06:00) (66/55 - 179/22)  BP(mean): 50 (13 May 2024 06:00) (33 - 143)  ABP: --  ABP(mean): --  RR: 20 (13 May 2024 06:00) (13 - 29)  SpO2: 100% (13 May 2024 06:00) (81% - 100%)    O2 Parameters below as of 13 May 2024 06:00  Patient On (Oxygen Delivery Method): ventilator    O2 Concentration (%): 40      Mode: AC/ CMV (Assist Control/ Continuous Mandatory Ventilation), RR (machine): 24, TV (machine): 500, FiO2: 50, PEEP: 8, ITime: 0.72, MAP: 14, PIP: 34  Plateau pressure:   P/F ratio:     05-11 @ 07:01  -  05-12 @ 07:00  --------------------------------------------------------  IN: 400 mL / OUT: 0 mL / NET: 400 mL    05-12 @ 07:01  -  05-13 @ 06:43  --------------------------------------------------------  IN: 585.7 mL / OUT: 0 mL / NET: 585.7 mL      CAPILLARY BLOOD GLUCOSE      POCT Blood Glucose.: 203 mg/dL (13 May 2024 05:27)    ECG:    PHYSICAL EXAMINATION:  General: Comfortable, no acute distress, cooperative with exam.  HEENT: PERRLA, EOMI, moist mucous membranes.  Respiratory: CTAB, normal respiratory effort, no coughing, wheezes, crackles, or rales.  CV: RRR, S1S2, no murmurs, rubs or gallops. No JVD. Distal pulses intact.  Abdominal: Soft, nontender, nondistended, no rebound or guarding, normal bowel sounds.  Neurology: AOx3, no focal neuro defects, BLACK x 4.  Extremities: No pitting edema, + Peripheral pulses.  Incisions:   Tubes:    MEDICATIONS:  MEDICATIONS  (STANDING):  aspirin  chewable 81 milliGRAM(s) Oral daily  chlorhexidine 0.12% Liquid 15 milliLiter(s) Oral Mucosa every 12 hours  chlorhexidine 2% Cloths 1 Application(s) Topical <User Schedule>  dextrose 10% Bolus 125 milliLiter(s) IV Bolus once  dextrose 5%. 1000 milliLiter(s) (100 mL/Hr) IV Continuous <Continuous>  dextrose 5%. 1000 milliLiter(s) (50 mL/Hr) IV Continuous <Continuous>  dextrose 50% Injectable 25 Gram(s) IV Push once  dextrose 50% Injectable 12.5 Gram(s) IV Push once  epoetin reilly (EPOGEN) Injectable 53010 Unit(s) IV Push <User Schedule>  glucagon  Injectable 1 milliGRAM(s) IntraMuscular once  heparin  Infusion.  Unit(s)/Hr (10 mL/Hr) IV Continuous <Continuous>  insulin lispro (ADMELOG) corrective regimen sliding scale   SubCutaneous every 6 hours  meropenem  IVPB 500 milliGRAM(s) IV Intermittent every 24 hours  norepinephrine Infusion 0.05 MICROgram(s)/kG/Min (4.93 mL/Hr) IV Continuous <Continuous>  propofol Infusion 19.987 MICROgram(s)/kG/Min (6.31 mL/Hr) IV Continuous <Continuous>  sevelamer carbonate 800 milliGRAM(s) Oral every 8 hours  sodium bicarbonate 650 milliGRAM(s) Oral every 8 hours  sodium chloride 3%  Inhalation 4 milliLiter(s) Inhalation every 12 hours    MEDICATIONS  (PRN):  dextrose Oral Gel 15 Gram(s) Oral once PRN Blood Glucose LESS THAN 70 milliGRAM(s)/deciliter      ALLERGIES:  Allergies    No Known Allergies    Intolerances        LABS:                        8.6    21.36 )-----------( 385      ( 13 May 2024 02:40 )             24.8     05-13    137  |  93<L>  |  102<H>  ----------------------------<  189<H>  4.5   |  14<L>  |  8.00<H>    Ca    8.9      13 May 2024 02:40  Phos  8.1     05-13  Mg     2.90     05-13    TPro  5.9<L>  /  Alb  2.5<L>  /  TBili  0.3  /  DBili  x   /  AST  9   /  ALT  12  /  AlkPhos  110  05-13    PT/INR - ( 12 May 2024 20:28 )   PT: 12.7 sec;   INR: 1.13 ratio         PTT - ( 13 May 2024 02:40 )  PTT:62.3 sec  Urinalysis Basic - ( 13 May 2024 02:40 )    Color: x / Appearance: x / SG: x / pH: x  Gluc: 189 mg/dL / Ketone: x  / Bili: x / Urobili: x   Blood: x / Protein: x / Nitrite: x   Leuk Esterase: x / RBC: x / WBC x   Sq Epi: x / Non Sq Epi: x / Bacteria: x        RADIOLOGY & ADDITIONAL TESTS: Reviewed.   INTERVAL HPI/OVERNIGHT EVENTS:    SUBJECTIVE: Patient seen and examined at bedside.   Intubated, sedated, ROS cannot be obtained.       VITAL SIGNS:  ICU Vital Signs Last 24 Hrs  T(C): 36.1 (13 May 2024 04:00), Max: 37.9 (12 May 2024 20:00)  T(F): 97 (13 May 2024 04:00), Max: 100.2 (12 May 2024 20:00)  HR: 56 (13 May 2024 06:00) (56 - 98)  BP: 122/21 (13 May 2024 06:00) (66/55 - 179/22)  BP(mean): 50 (13 May 2024 06:00) (33 - 143)  ABP: --  ABP(mean): --  RR: 20 (13 May 2024 06:00) (13 - 29)  SpO2: 100% (13 May 2024 06:00) (81% - 100%)    O2 Parameters below as of 13 May 2024 06:00  Patient On (Oxygen Delivery Method): ventilator    O2 Concentration (%): 40      Mode: AC/ CMV (Assist Control/ Continuous Mandatory Ventilation), RR (machine): 24, TV (machine): 500, FiO2: 50, PEEP: 8, ITime: 0.72, MAP: 14, PIP: 34  Plateau pressure:   P/F ratio:     05-11 @ 07:01  -  05-12 @ 07:00  --------------------------------------------------------  IN: 400 mL / OUT: 0 mL / NET: 400 mL    05-12 @ 07:01  -  05-13 @ 06:43  --------------------------------------------------------  IN: 585.7 mL / OUT: 0 mL / NET: 585.7 mL      CAPILLARY BLOOD GLUCOSE      POCT Blood Glucose.: 203 mg/dL (13 May 2024 05:27)    ECG:    PHYSICAL EXAMINATION:  General: Sedated, not opening eyes or moving to stimulus.   HEENT: PERRLA, EOMI, moist mucous membranes.  Respiratory: CTAB, normal respiratory effort, no coughing, wheezes, crackles, or rales. ETT in place.   CV: RRR, S1S2, no murmurs, rubs or gallops. No JVD. Distal pulses intact.  Abdominal: Soft, nontender, nondistended, no rebound or guarding, normal bowel sounds.  Neurology: AOx0 on sedation  Extremities: No pitting edema, + Peripheral pulses.  Tubes: ETT    MEDICATIONS:  MEDICATIONS  (STANDING):  aspirin  chewable 81 milliGRAM(s) Oral daily  chlorhexidine 0.12% Liquid 15 milliLiter(s) Oral Mucosa every 12 hours  chlorhexidine 2% Cloths 1 Application(s) Topical <User Schedule>  dextrose 10% Bolus 125 milliLiter(s) IV Bolus once  dextrose 5%. 1000 milliLiter(s) (100 mL/Hr) IV Continuous <Continuous>  dextrose 5%. 1000 milliLiter(s) (50 mL/Hr) IV Continuous <Continuous>  dextrose 50% Injectable 25 Gram(s) IV Push once  dextrose 50% Injectable 12.5 Gram(s) IV Push once  epoetin reilly (EPOGEN) Injectable 38588 Unit(s) IV Push <User Schedule>  glucagon  Injectable 1 milliGRAM(s) IntraMuscular once  heparin  Infusion.  Unit(s)/Hr (10 mL/Hr) IV Continuous <Continuous>  insulin lispro (ADMELOG) corrective regimen sliding scale   SubCutaneous every 6 hours  meropenem  IVPB 500 milliGRAM(s) IV Intermittent every 24 hours  norepinephrine Infusion 0.05 MICROgram(s)/kG/Min (4.93 mL/Hr) IV Continuous <Continuous>  propofol Infusion 19.987 MICROgram(s)/kG/Min (6.31 mL/Hr) IV Continuous <Continuous>  sevelamer carbonate 800 milliGRAM(s) Oral every 8 hours  sodium bicarbonate 650 milliGRAM(s) Oral every 8 hours  sodium chloride 3%  Inhalation 4 milliLiter(s) Inhalation every 12 hours    MEDICATIONS  (PRN):  dextrose Oral Gel 15 Gram(s) Oral once PRN Blood Glucose LESS THAN 70 milliGRAM(s)/deciliter      ALLERGIES:  Allergies    No Known Allergies    Intolerances        LABS:                        8.6    21.36 )-----------( 385      ( 13 May 2024 02:40 )             24.8     05-13    137  |  93<L>  |  102<H>  ----------------------------<  189<H>  4.5   |  14<L>  |  8.00<H>    Ca    8.9      13 May 2024 02:40  Phos  8.1     05-13  Mg     2.90     05-13    TPro  5.9<L>  /  Alb  2.5<L>  /  TBili  0.3  /  DBili  x   /  AST  9   /  ALT  12  /  AlkPhos  110  05-13    PT/INR - ( 12 May 2024 20:28 )   PT: 12.7 sec;   INR: 1.13 ratio         PTT - ( 13 May 2024 02:40 )  PTT:62.3 sec  Urinalysis Basic - ( 13 May 2024 02:40 )    Color: x / Appearance: x / SG: x / pH: x  Gluc: 189 mg/dL / Ketone: x  / Bili: x / Urobili: x   Blood: x / Protein: x / Nitrite: x   Leuk Esterase: x / RBC: x / WBC x   Sq Epi: x / Non Sq Epi: x / Bacteria: x        RADIOLOGY & ADDITIONAL TESTS: Reviewed.

## 2024-05-13 NOTE — PROGRESS NOTE ADULT - ASSESSMENT
72 y/o M PMhx HTN, ESRD (on HD M/W/F), DM who presented with hiccups x 2 days and chest pain found to have peaked T waves. Hospital course c/b AMS s/p RRT on 5/1 and 5/2. Vascular consulted 2/2 RUE AVF access unable to be used s/p R femoral shiley placed for emergent HD. Transferred to MICU and intubated 5/2 for airway protection.     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 Elevated RIGHT hemidiaphragm and RLL atelectasis    2-AMS, CVA  TTE- no evidence of valvular disease  s/p LP 5/2, cell count not consistent w/ bacterial meningitis, CSF PCR- negative  MRI- Punctate foci of restricted diffusion in the left talya and left cerebellum with associated T2 prolongation consistent with acute infarcts  s/p empiric zosyn x 5 days, completed 5/7    3-ESRD  tentative plan for RUE fistulogram when medically optimized    Recommendations  vanc level elevated  repeat random vanc level tomorrow AM  c/w meropenem for now  f/u blood cultures    Steven Torres M.D.  [facility], Division of Infectious Diseases  [phone]  After 5pm on weekdays and all day on weekends - please call [phone]

## 2024-05-13 NOTE — PROGRESS NOTE ADULT - ASSESSMENT
73M PMHx HTN, ESRD, IDDM p/w hiccups and started on baclofen with concern for AMS overnight and desaturation, ?cheye nascimento respirations. Labs with numerous metabolic derangements. CTH with bifrontal encephalomalacia, likley traumatic.   WBC inc significantly, now intubated. Exam limited as on propofol, also on pressors.   s/p LP for meningitis concerns however appears bland - not on antibiotics  EEG with GPDs, no seizures  MR brain with punctuate L pontine and L cerebellar infarcts  MRA H/N with mild basilar fenestration, otherwise neg  mental status improving post extubation but now reintubated for recurrent aspiration     AMS of unclear etiology - ? baclofen toxicity, no evidence of seizures. Metabolic encephalopathy  L pontine and L cerebellar strokes - embolic appearing  Intractable hiccups  hypoxic resp failure  ESRD on HD  B cereus bacteremia     MRI, MRA reviewed, as above  cont aspirin 81mg  TTE reviewed, no need to repeat for now  Keppra started for GPDs, no improvement in mental status - now better off keppra  continue to monitor off Keppra, avoid baclofen  possible trach/peg per MICU  vent mgmt per ICU  HD per nephro  f/u remainder of CSF - negative  s/p Zosyn for pna  Now on Meropenem  Dvt ppx

## 2024-05-13 NOTE — CHART NOTE - NSCHARTNOTEFT_GEN_A_CORE
: Nila Gibbs PA-C    INDICATION: AHRF    PROCEDURE:  [X] LIMITED ECHO  [X] LIMITED CHEST  [ ] LIMITED RETROPERITONEAL  [ ] LIMITED ABDOMINAL  [ ] LIMITED DVT  [ ] NEEDLE GUIDANCE VASCULAR  [ ] NEEDLE GUIDANCE THORACENTESIS  [ ] NEEDLE GUIDANCE PARACENTESIS  [ ] NEEDLE GUIDANCE PERICARDIOCENTESIS  [ ] OTHER    FINDINGS:    Grossly normal cardiac systolic function. IVC 1.89 cm. A-line predominate pattern to anterior lung fields bilaterally. Pleural effusion noted to right lung base with consolidation.     INTERPRETATION:    Grossly normal cardiac systolic function and aeration pattern to anterior lung fields. PLEF to right lung base with consolidation. : Nila Gibbs PA-C    INDICATION: AHRF    PROCEDURE:  [X] LIMITED ECHO  [X] LIMITED CHEST  [ ] LIMITED RETROPERITONEAL  [ ] LIMITED ABDOMINAL  [ ] LIMITED DVT  [ ] NEEDLE GUIDANCE VASCULAR  [ ] NEEDLE GUIDANCE THORACENTESIS  [ ] NEEDLE GUIDANCE PARACENTESIS  [ ] NEEDLE GUIDANCE PERICARDIOCENTESIS  [ ] OTHER    FINDINGS:    Grossly normal LV systolic function. IVC 1.89 cm. A-line predominate pattern to anterior lung fields bilaterally. Pleural effusion noted to right lung base with consolidation.     INTERPRETATION:    Grossly normal cardiac systolic function and aeration pattern to anterior lung fields. PLEF to right lung base with consolidation.    Attending Attestation:  I was present during the key portions of the procedure and immediately available during the entire procedure.  Bonnie Bhakta MD  Attending  Pulmonary & Critical Care Medicine

## 2024-05-13 NOTE — PROGRESS NOTE ADULT - SUBJECTIVE AND OBJECTIVE BOX
[facility]   MD ELIANE BHAGAT MD MARIA SANTIAGO, NP    TEL:  OFFICE: [phone]  From 5pm-7am Answering Service [phone]    -- RENAL FOLLOW UP NOTE ---Date of Service 05-13-24 @ 13:52    Patient is a 73y old  Male who presents with a chief complaint of Unstable angina, abn EKG       Patient seen and examined at in ICU. Patient intubated in no apparent distress.    VITALS:  T(F): 96.1 (05-13-24 @ 12:00), Max: 100.2 (05-12-24 @ 20:00)  HR: 59 (05-13-24 @ 13:00)  BP: 127/38 (05-13-24 @ 13:00)  RR: 18 (05-13-24 @ 13:00)  SpO2: 99% (05-13-24 @ 13:00)  Wt(kg): --    05-12 @ 07:01  -  05-13 @ 07:00  --------------------------------------------------------  IN: 657.1 mL / OUT: 0 mL / NET: 657.1 mL    05-13 @ 07:01  -  05-13 @ 13:52  --------------------------------------------------------  IN: 192.3 mL / OUT: 0 mL / NET: 192.3 mL          PHYSICAL EXAM:  General: NAD  Neck: No JVD  Respiratory: intubated  Cardiovascular: S1, S2, RRR  Gastrointestinal: BS+, soft, NT/ND  Extremities: No peripheral edema    Hospital Medications:   MEDICATIONS  (STANDING):  aspirin  chewable 81 milliGRAM(s) Oral daily  chlorhexidine 0.12% Liquid 15 milliLiter(s) Oral Mucosa every 12 hours  chlorhexidine 2% Cloths 1 Application(s) Topical <User Schedule>  chlorhexidine 4% Liquid 1 Application(s) Topical <User Schedule>  dextrose 10% Bolus 125 milliLiter(s) IV Bolus once  dextrose 5%. 1000 milliLiter(s) (100 mL/Hr) IV Continuous <Continuous>  dextrose 5%. 1000 milliLiter(s) (50 mL/Hr) IV Continuous <Continuous>  dextrose 50% Injectable 25 Gram(s) IV Push once  dextrose 50% Injectable 12.5 Gram(s) IV Push once  epoetin reilly (EPOGEN) Injectable 09758 Unit(s) IV Push <User Schedule>  glucagon  Injectable 1 milliGRAM(s) IntraMuscular once  heparin  Infusion.  Unit(s)/Hr (10 mL/Hr) IV Continuous <Continuous>  insulin lispro (ADMELOG) corrective regimen sliding scale   SubCutaneous every 6 hours  meropenem  IVPB 500 milliGRAM(s) IV Intermittent every 24 hours  norepinephrine Infusion 0.05 MICROgram(s)/kG/Min (4.93 mL/Hr) IV Continuous <Continuous>  petrolatum Ophthalmic Ointment 1 Application(s) Both EYES two times a day  propofol Infusion 19.987 MICROgram(s)/kG/Min (6.31 mL/Hr) IV Continuous <Continuous>  sevelamer carbonate 800 milliGRAM(s) Oral every 8 hours  sodium bicarbonate 650 milliGRAM(s) Oral every 8 hours  sodium chloride 3%  Inhalation 4 milliLiter(s) Inhalation every 12 hours      LABS:  05-13    137  |  93<L>  |  102<H>  ----------------------------<  189<H>  4.5   |  14<L>  |  8.00<H>    Ca    8.9      13 May 2024 02:40  Phos  8.1     05-13  Mg     2.90     05-13    TPro  5.9<L>  /  Alb  2.5<L>  /  TBili  0.3  /  DBili      /  AST  9   /  ALT  12  /  AlkPhos  110  05-13    Creatinine Trend: 8.00 <--, 7.97 <--, 6.68 <--, 4.24 <--, 4.23 <--, 3.99 <--, 5.81 <--, 3.94 <--, 3.77 <--    Albumin: 2.5 g/dL (05-13 @ 02:40)  Phosphorus: 8.1 mg/dL (05-13 @ 02:40)  Albumin: 2.9 g/dL (05-12 @ 20:28)  Phosphorus: 9.0 mg/dL (05-12 @ 20:28)                              8.6    21.36 )-----------( 385      ( 13 May 2024 02:40 )             24.8     Urine Studies:  Urinalysis - [05-13-24 @ 02:40]      Color  / Appearance  / SG  / pH       Gluc 189 / Ketone   / Bili  / Urobili        Blood  / Protein  / Leuk Est  / Nitrite       RBC  / WBC  / Hyaline  / Gran  / Sq Epi  / Non Sq Epi  / Bacteria       Iron 47, TIBC --, %sat --      [05-01-24 @ 06:44]  TSH 2.79      [04-30-24 @ 06:03]    HBsAb <3.0      [05-01-24 @ 14:42]  HBcAb Nonreact      [05-01-24 @ 14:42]      RADIOLOGY & ADDITIONAL STUDIES:

## 2024-05-13 NOTE — PROCEDURE NOTE - SUPERVISORY STATEMENT
Pre intubation, patient with saturation in 80s despite 100% HFNC.  Post intubation, saturation wavered from 80 to 90% despite 100% fio2 on ventilator, requiring urgent bronchoscopy.  Thick mucous plug on right aspirated with improvement in saturation.
Bowen Membreno MD  Interventional Pulmonology & Critical Care Medicine
Ultrasound guided insertion of dialysis catheter.

## 2024-05-13 NOTE — PROGRESS NOTE ADULT - ASSESSMENT
73M w/ PMHx hypertension, ESRD on HD via R radiocephalic fistula (MWF), DM, HTN, p/w hiccups and peaked T waves, admitted to MICU for c/f baclofen toxicity. Patient received 1x HD on admission. R femoral shiley removed 5/2, had 2 RRT for AMS and failed HD via AVF 5/3. S/p emergent R IJ shiley placement 5/3, started CRRT which is now transitioned back to iHD. Vascular planning RUE fistulogram when medically optimized. Extubated to HFNC then reintubated 5/12 for c/f aspiration w/ hypoxic resp failure.    Recommendations:   - Patient reintubated, on pressors - fistulogram postponed until further medically optimized  - Line holiday for BCx 5/10 + for bacillus cereus x1, repeat BCx NGTD - MICU plan to replace shiley for HD  - Global care per primary     Vascular Surgery  [id number] 73M w/ PMHx hypertension, ESRD on HD via R radiocephalic fistula (MWF), DM, HTN, p/w hiccups and peaked T waves, admitted to MICU for c/f baclofen toxicity. Patient received 1x HD on admission. R femoral shiley removed 5/2, had 2 RRT for AMS and failed HD via AVF 5/3. S/p emergent R IJ shiley placement 5/3, started CRRT which is now transitioned back to iHD. Vascular planning RUE fistulogram when medically optimized. Extubated to HFNC then reintubated 5/12 for c/f aspiration w/ hypoxic resp failure.    Recommendations:   - Patient reintubated, on pressors - fistulogram postponed until further medically optimized  - Line holiday for BCx 5/10 + for bacillus cereus x1, repeat BCx NGTD - MICU plan to replace shiley for HD  - Hep gtt started for nonocclusive R common iliac DVT  - Global care per primary     Vascular Surgery  [id number] 73M w/ PMHx hypertension, ESRD on HD via R radiocephalic fistula (MWF), DM, HTN, p/w hiccups and peaked T waves, admitted to MICU for c/f baclofen toxicity. Patient received 1x HD on admission. R femoral shiley removed 5/2, had 2 RRT for AMS and failed HD via AVF 5/3. S/p emergent R IJ shiley placement 5/3, started CRRT which is now transitioned back to iHD. Vascular planning RUE fistulogram when medically optimized. Extubated to HFNC then reintubated 5/12 for c/f aspiration w/ hypoxic resp failure.    Recommendations:   - Patient reintubated, on pressors - fistulogram postponed until further medically optimized  - Line holiday for BCx 5/10 + for bacillus cereus x1, repeat BCx NGTD - MICU plan to replace shiley for HD  - Hep gtt started for nonocclusive R common iliac DVT  - Global care per primary   - Vascular to follow peripherally, please call back when patient extubated or if concerns about bleeding, ect from the UE.    Vascular Surgery  [id number]

## 2024-05-13 NOTE — PROGRESS NOTE ADULT - SUBJECTIVE AND OBJECTIVE BOX
Neurology Progress Note    S: Patient seen and examined. Reintubated yesterday    Medications: MEDICATIONS  (STANDING):  alteplase for catheter clearance 2 milliGRAM(s) Catheter once  aspirin  chewable 81 milliGRAM(s) Oral daily  chlorhexidine 0.12% Liquid 15 milliLiter(s) Oral Mucosa every 12 hours  chlorhexidine 2% Cloths 1 Application(s) Topical <User Schedule>  chlorhexidine 4% Liquid 1 Application(s) Topical <User Schedule>  dextrose 10% Bolus 125 milliLiter(s) IV Bolus once  dextrose 5%. 1000 milliLiter(s) (100 mL/Hr) IV Continuous <Continuous>  dextrose 5%. 1000 milliLiter(s) (50 mL/Hr) IV Continuous <Continuous>  dextrose 50% Injectable 25 Gram(s) IV Push once  dextrose 50% Injectable 12.5 Gram(s) IV Push once  epoetin reilly (EPOGEN) Injectable 45847 Unit(s) IV Push <User Schedule>  glucagon  Injectable 1 milliGRAM(s) IntraMuscular once  heparin  Infusion.  Unit(s)/Hr (10 mL/Hr) IV Continuous <Continuous>  insulin lispro (ADMELOG) corrective regimen sliding scale   SubCutaneous every 6 hours  meropenem  IVPB 500 milliGRAM(s) IV Intermittent every 24 hours  norepinephrine Infusion 0.05 MICROgram(s)/kG/Min (4.93 mL/Hr) IV Continuous <Continuous>n  petrolatum Ophthalmic Ointment 1 Application(s) Both EYES two times a day  propofol Infusion 19.987 MICROgram(s)/kG/Min (6.31 mL/Hr) IV Continuous <Continuous>  sevelamer carbonate 800 milliGRAM(s) Oral every 8 hours  sodium bicarbonate 650 milliGRAM(s) Oral every 8 hours  sodium chloride 3%  Inhalation 4 milliLiter(s) Inhalation every 12 hours    MEDICATIONS  (PRN):  dextrose Oral Gel 15 Gram(s) Oral once PRN Blood Glucose LESS THAN 70 milliGRAM(s)/deciliter  sodium chloride 0.9% lock flush 10 milliLiter(s) IV Push every 1 hour PRN Pre/post blood products, medications, blood draw, and to maintain line patency       Vitals:  Vital Signs Last 24 Hrs  T(C): 36.1 (13 May 2024 20:55), Max: 36.9 (13 May 2024 00:00)  T(F): 97 (13 May 2024 20:55), Max: 98.5 (13 May 2024 00:00)  HR: 77 (13 May 2024 22:26) (54 - 91)  BP: 128/57 (13 May 2024 22:00) (84/36 - 168/36)  BP(mean): 79 (13 May 2024 22:00) (46 - 93)  RR: 20 (13 May 2024 22:00) (18 - 24)  SpO2: 99% (13 May 2024 22:26) (94% - 100%)    Parameters below as of 13 May 2024 22:23  Patient On (Oxygen Delivery Method): ventilator            General Exam:   General Appearance: intubated, not sedasted    Head: Normocephalic, atraumatic and no dysmorphic features  Ear, Nose, and Throat: Moist mucous membranes  CVS: S1S2+  Resp: mechanical  Abd: soft NTND  Extremities: No edema, no cyanosis  Skin: No bruises, no rashes     Neurological Exam:  Mental Status: intubated, not sedated. Opens eyes to voice, does not track or FC  Cranial Nerves: pupils 1-2mm, no facial asymmetry  Motor: dec tone througout, no spontaneous movement  Sensation: no Wd to noxious       I personally reviewed the below data/images/labs:    LABS:                          8.6    21.36 )-----------( 385      ( 13 May 2024 02:40 )             24.8     05-13    137  |  93<L>  |  102<H>  ----------------------------<  189<H>  4.5   |  14<L>  |  8.00<H>    Ca    8.9      13 May 2024 02:40  Phos  8.1     05-13  Mg     2.90     05-13    TPro  5.9<L>  /  Alb  2.5<L>  /  TBili  0.3  /  DBili  x   /  AST  9   /  ALT  12  /  AlkPhos  110  05-13    LIVER FUNCTIONS - ( 13 May 2024 02:40 )  Alb: 2.5 g/dL / Pro: 5.9 g/dL / ALK PHOS: 110 U/L / ALT: 12 U/L / AST: 9 U/L / GGT: x           PT/INR - ( 12 May 2024 20:28 )   PT: 12.7 sec;   INR: 1.13 ratio         PTT - ( 13 May 2024 02:40 )  PTT:62.3 sec  Urinalysis Basic - ( 13 May 2024 02:40 )    Color: x / Appearance: x / SG: x / pH: x  Gluc: 189 mg/dL / Ketone: x  / Bili: x / Urobili: x   Blood: x / Protein: x / Nitrite: x   Leuk Esterase: x / RBC: x / WBC x   Sq Epi: x / Non Sq Epi: x / Bacteria: x            CT Head No Cont:  (01 May 2024 18:11)  < from: CT Head No Cont (05.01.24 @ 18:11) >    ACC: [id number] EXAM:  CT BRAIN   ORDERED BY: SHELBY MOREL     PROCEDURE DATE:  05/01/2024          INTERPRETATION:  CT OF THE HEAD WITHOUT CONTRAST    CLINICAL INDICATION: Lethargy.    TECHNIQUE: Volumetric CT acquisition was performed through the brain and   reviewed using brain and bone window technique.      COMPARISON: CT head from 1/17/2024    FINDINGS:    The ventricular and sulcal size and configuration is age appropriate.     There is no acute loss of gray-white differentiation. Stable bilateral   inferior frontal encephalomalacia and gliosis likely related to remote   trauma.    There is no evidence of mass effect, midline shift, acute intracranial   hemorrhage, or extra-axial collections.     The calvarium is intact. The paranasalsinuses are clear.The mastoid air   cells are predominantly clear. The orbits are unremarkable.      IMPRESSION:  No acute intracranial hemorrhage or acute territorial infarction. Chronic   findings as above.    --- End of Report ---          GILDARDO KHAN; Resident Radiologist  This document has been electronically signed.  LADARIUS DENNY MD; Attending Radiologist  This document has been electronically signed. May  1 2024  7:54PM    < end of copied text >      EEG Classification / Summary:  Abnormal EEG in the awake, drowsy states.   -Generalized sharp waves with triphasic morphology, initially appearing as frequent GPDs at 1-1.5 Hz, decreasing in prevalence later in recording.  -Variable moderate to mild diffuse slowing.  -No electrographic seizures are captured.     Clinical Impression:  -Generalized sharp waves with triphasic morphology can be seen in toxic-metabolic encephalopathies and indicate some risk of generalized seizures, especially when at higher frequencies than in this study. These decrease in prevalence over the course of recording.  -Variable moderate to mild diffuse cerebral dysfunction is nonspecific in etiology.   -No seizures x2 days of recording.    m< from: MR Head w/wo IV Cont (05.06.24 @ 18:10) >    ACC: [id number] EXAM:  MR BRAIN WAW IC   ORDERED BY: DOLORES QUICK     PROCEDURE DATE:  05/06/2024          INTERPRETATION:  CLINICAL INDICATION: Altered mental status.    TECHNIQUE: Multi-planar, multi-sequence magnetic resonance imaging of the   brain was performed with and without intravenous contrast.    CONTRAST: Post-contrast MR images were obtained following infusion of 5   mL of Gadavist    COMPARISON: No prior studies are available for comparison    FINDINGS:    VENTRICLES AND SULCI:  Normal.  INTRA-AXIAL: Punctate foci of restricted diffusion in the left talya and   left cerebellum with associated T2 prolongation consistent with acute   infarcts. No acute intracranial hemorrhage. Encephalomalacia/gliosis   noted in the inferior frontal lobes. No abnormal enhancement. There are   patchy and confluent foci of hyperintense T2 signal within the   subcortical and periventricular white matter which are nonspecific but   likely related to chronic microvascular ischemic disease.  EXTRA-AXIAL:  No mass or collection.  VISUALIZED SINUSES: Mild polypoid mucosal thickening. Fluid noted in the   nasopharynx.  VISUALIZED MASTOIDS:  Clear.  CALVARIUM:  Normal.  CAROTID FLOW VOIDS: Normal.  MISCELLANEOUS:  None.    IMPRESSION: PUNCTATE FOCI OF RESTRICTED DIFFUSION IN THE LEFT TALYA AND   LEFT CEREBELLUM WITH ASSOCIATED T2 PROLONGATION CONSISTENT WITH ACUTE   INFARCTS. NO ACUTE INTRACRANIAL HEMORRHAGE. NO AREA OF ABNORMAL   ENHANCEMENT.    < end of copied text >    m< from: MR Angio Head No Cont (05.09.24 @ 15:58) >    ACC: [id number] EXAM:  MR ANGIO NECK WAW IC   ORDERED BY: OMAR NESBITT     ACC: [id number] EXAM:  MR ANGIO BRAIN   ORDERED BY: MOAR NESBITT     PROCEDURE DATE:  05/09/2024          INTERPRETATION:  .    CLINICAL INFORMATION: Stroke workup. Talya/cerebellar stroke.    TECHNIQUE: MRA images through the neck and [place] were obtained   using a combination of 2-D and 3-D time-of-flight acquisition. Post   contrast MR angiography of the neck was also performed. The data was then   reformatted intoa volumetric data set and reviewed as rotational MIP   images. 5 cc's of IV Gadavist was administered without immediate   complication. 2.5 cc's was discarded.    COMPARISON: Prior head CT exam dated 5/1/2024.    FINDINGS:    MRA Neck: There is a standard anatomic configuration to the aortic arch.    The origins of the great vessels appear unremarkable. The bilateral   common carotid arteries and carotid bifurcations appear unremarkable.    The bilateral cervical internal carotid arteries are within normal limits.    The origins of the bilateral vertebral arteries are normal. The bilateral   cervical vertebral arteries are normal in course and caliber.    MRA [place]: The bilateral intracranial internal carotid,   anterior, and middle cerebral arteries appear unremarkable.    The anterior and bilateral posterior communicating arteries are notable.    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 Otherwise, the   bilateral intradural vertebral arteries, vertebrobasilar junction,   basilar artery, and basilar tip appear unremarkable as well as the   bilateral posterior cerebral arteries.    IMPRESSION: No large vessel occlusion or stenosis.    < end of copied text >

## 2024-05-13 NOTE — PROGRESS NOTE ADULT - ASSESSMENT
73-year-old male past medical history hypertension, ESRD on dialysis Monday Wednesday Friday, diabetes insulin-dependent presents with hiccups patient endorses persistent hiccups for the last 2 days. Nephrology consulted for HD needs.    A/P  ESRD:  Center: [place]  Nephrologist: Dr. Hardwick  Access: R AVF  MWF schedule  Vascular for fistulogram   s/p femoral shiley on 5/1, now removed  s/p placement of IJ shiley 5/3  Stopped CRRT   Restarted IHD  s/p HD 5/7 UF 1778; treatment terminated early due to hypotension   S/P MRA head/neck w/ gadolinium --> Received HD on 5/9 and 5/10.  5/10 Will need to dialyze 3 days consecutively--> plan for HD on 5/11  5/10 Spoke to ICU; will UF net even tomorrow  5/11 shiley removed due to bacteremia; did not receive HD.  Line holiday  5/12 - BUN worsened; K up trending.  D/W ICU will need shiley and plan to dialyze tomorrow.  5/13 Shiley placed, HD today  On vancomycin - adjust dose per vanco trough level; level 39 today.  Consent obtained and placed in ED chart  Renal diet  Monitor BMP    Hyperkalemia  Up trending.  Lokelma 10gm x2 doses 5/12  Better doay  HD today  Low K diet.  Monitor closely     HTN:  BP fluctuating.  Back on pressors and antihypertensives.  Care per ICU.  Monitor BP.    Anemia:  SIVLA w/ HD; hold for Hgb >11.  Monitor Hb.    CKD-MBD  Check PTH  PO4 fluctuating  Consider sevelamer 1600mg TID.  Low PO4 diet.  Monitor Ca, PO4 daily    Hypocalcemia:  In setting of CKD vs. hypoalbuminemia.  Optimize albumin.  Improving.  Monitor Ca.    D/W ICU team

## 2024-05-14 LAB
ALBUMIN SERPL ELPH-MCNC: 2.7 G/DL — LOW (ref 3.3–5)
ALP SERPL-CCNC: 114 U/L — SIGNIFICANT CHANGE UP (ref 40–120)
ALT FLD-CCNC: 9 U/L — SIGNIFICANT CHANGE UP (ref 4–41)
ANION GAP SERPL CALC-SCNC: 23 MMOL/L — HIGH (ref 7–14)
APTT BLD: 45.2 SEC — HIGH (ref 24.5–35.6)
AST SERPL-CCNC: 14 U/L — SIGNIFICANT CHANGE UP (ref 4–40)
BASE EXCESS BLDV CALC-SCNC: -2.8 MMOL/L — LOW (ref -2–3)
BASOPHILS # BLD AUTO: 0.01 K/UL — SIGNIFICANT CHANGE UP (ref 0–0.2)
BASOPHILS NFR BLD AUTO: 0.1 % — SIGNIFICANT CHANGE UP (ref 0–2)
BILIRUB SERPL-MCNC: 0.3 MG/DL — SIGNIFICANT CHANGE UP (ref 0.2–1.2)
BLD GP AB SCN SERPL QL: NEGATIVE — SIGNIFICANT CHANGE UP
BLOOD GAS VENOUS COMPREHENSIVE RESULT: SIGNIFICANT CHANGE UP
BUN SERPL-MCNC: 79 MG/DL — HIGH (ref 7–23)
CALCIUM SERPL-MCNC: 8.4 MG/DL — SIGNIFICANT CHANGE UP (ref 8.4–10.5)
CHLORIDE BLDV-SCNC: 97 MMOL/L — SIGNIFICANT CHANGE UP (ref 96–108)
CHLORIDE SERPL-SCNC: 94 MMOL/L — LOW (ref 98–107)
CO2 BLDV-SCNC: 22.2 MMOL/L — SIGNIFICANT CHANGE UP (ref 22–26)
CO2 SERPL-SCNC: 19 MMOL/L — LOW (ref 22–31)
CREAT SERPL-MCNC: 5.83 MG/DL — HIGH (ref 0.5–1.3)
CULTURE RESULTS: ABNORMAL
EGFR: 10 ML/MIN/1.73M2 — LOW
EOSINOPHIL # BLD AUTO: 0.08 K/UL — SIGNIFICANT CHANGE UP (ref 0–0.5)
EOSINOPHIL NFR BLD AUTO: 0.6 % — SIGNIFICANT CHANGE UP (ref 0–6)
GAS PNL BLDV: 134 MMOL/L — LOW (ref 136–145)
GLUCOSE BLDC GLUCOMTR-MCNC: 162 MG/DL — HIGH (ref 70–99)
GLUCOSE BLDC GLUCOMTR-MCNC: 192 MG/DL — HIGH (ref 70–99)
GLUCOSE BLDC GLUCOMTR-MCNC: 230 MG/DL — HIGH (ref 70–99)
GLUCOSE BLDC GLUCOMTR-MCNC: 247 MG/DL — HIGH (ref 70–99)
GLUCOSE BLDC GLUCOMTR-MCNC: 253 MG/DL — HIGH (ref 70–99)
GLUCOSE BLDV-MCNC: 217 MG/DL — HIGH (ref 70–99)
GLUCOSE SERPL-MCNC: 228 MG/DL — HIGH (ref 70–99)
HCO3 BLDV-SCNC: 21 MMOL/L — LOW (ref 22–29)
HCT VFR BLD CALC: 25 % — LOW (ref 39–50)
HCT VFR BLDA CALC: 18 % — CRITICAL LOW (ref 39–51)
HGB BLD CALC-MCNC: 6.1 G/DL — CRITICAL LOW (ref 12.6–17.4)
HGB BLD-MCNC: 8.4 G/DL — LOW (ref 13–17)
IANC: 12.29 K/UL — HIGH (ref 1.8–7.4)
IMM GRANULOCYTES NFR BLD AUTO: 0.4 % — SIGNIFICANT CHANGE UP (ref 0–0.9)
INR BLD: 1.14 RATIO — SIGNIFICANT CHANGE UP (ref 0.85–1.18)
LACTATE BLDV-MCNC: 1.6 MMOL/L — SIGNIFICANT CHANGE UP (ref 0.5–2)
LYMPHOCYTES # BLD AUTO: 0.55 K/UL — LOW (ref 1–3.3)
LYMPHOCYTES # BLD AUTO: 4.1 % — LOW (ref 13–44)
MAGNESIUM SERPL-MCNC: 2.4 MG/DL — SIGNIFICANT CHANGE UP (ref 1.6–2.6)
MCHC RBC-ENTMCNC: 20.1 PG — LOW (ref 27–34)
MCHC RBC-ENTMCNC: 33.6 GM/DL — SIGNIFICANT CHANGE UP (ref 32–36)
MCV RBC AUTO: 60 FL — LOW (ref 80–100)
MONOCYTES # BLD AUTO: 0.54 K/UL — SIGNIFICANT CHANGE UP (ref 0–0.9)
MONOCYTES NFR BLD AUTO: 4 % — SIGNIFICANT CHANGE UP (ref 2–14)
NEUTROPHILS # BLD AUTO: 12.29 K/UL — HIGH (ref 1.8–7.4)
NEUTROPHILS NFR BLD AUTO: 90.8 % — HIGH (ref 43–77)
NRBC # BLD: 0 /100 WBCS — SIGNIFICANT CHANGE UP (ref 0–0)
NRBC # FLD: 0.08 K/UL — HIGH (ref 0–0)
PCO2 BLDV: 32 MMHG — LOW (ref 42–55)
PH BLDV: 7.43 — SIGNIFICANT CHANGE UP (ref 7.32–7.43)
PHOSPHATE SERPL-MCNC: 6.6 MG/DL — HIGH (ref 2.5–4.5)
PLATELET # BLD AUTO: 360 K/UL — SIGNIFICANT CHANGE UP (ref 150–400)
PO2 BLDV: 46 MMHG — HIGH (ref 25–45)
POTASSIUM BLDV-SCNC: 4 MMOL/L — SIGNIFICANT CHANGE UP (ref 3.5–5.1)
POTASSIUM SERPL-MCNC: 3.8 MMOL/L — SIGNIFICANT CHANGE UP (ref 3.5–5.3)
POTASSIUM SERPL-SCNC: 3.8 MMOL/L — SIGNIFICANT CHANGE UP (ref 3.5–5.3)
PROT SERPL-MCNC: 5.8 G/DL — LOW (ref 6–8.3)
PROTHROM AB SERPL-ACNC: 12.7 SEC — SIGNIFICANT CHANGE UP (ref 9.5–13)
RBC # BLD: 4.17 M/UL — LOW (ref 4.2–5.8)
RBC # FLD: 19.5 % — HIGH (ref 10.3–14.5)
RH IG SCN BLD-IMP: POSITIVE — SIGNIFICANT CHANGE UP
SAO2 % BLDV: 75.8 % — SIGNIFICANT CHANGE UP (ref 67–88)
SODIUM SERPL-SCNC: 136 MMOL/L — SIGNIFICANT CHANGE UP (ref 135–145)
SPECIMEN SOURCE: SIGNIFICANT CHANGE UP
WBC # BLD: 13.53 K/UL — HIGH (ref 3.8–10.5)
WBC # FLD AUTO: 13.53 K/UL — HIGH (ref 3.8–10.5)

## 2024-05-14 PROCEDURE — 99291 CRITICAL CARE FIRST HOUR: CPT | Mod: GC,25

## 2024-05-14 PROCEDURE — 31600 PLANNED TRACHEOSTOMY: CPT | Mod: GC

## 2024-05-14 PROCEDURE — 31646 BRNCHSC W/THER ASPIR SBSQ: CPT | Mod: GC

## 2024-05-14 PROCEDURE — 71045 X-RAY EXAM CHEST 1 VIEW: CPT | Mod: 26

## 2024-05-14 PROCEDURE — 31624 DX BRONCHOSCOPE/LAVAGE: CPT | Mod: GC

## 2024-05-14 PROCEDURE — 76536 US EXAM OF HEAD AND NECK: CPT | Mod: 26,GC

## 2024-05-14 RX ORDER — CISATRACURIUM BESYLATE 2 MG/ML
20 INJECTION INTRAVENOUS ONCE
Refills: 0 | Status: COMPLETED | OUTPATIENT
Start: 2024-05-14 | End: 2024-05-14

## 2024-05-14 RX ORDER — DESMOPRESSIN ACETATE 0.1 MG/1
15 TABLET ORAL ONCE
Refills: 0 | Status: COMPLETED | OUTPATIENT
Start: 2024-05-14 | End: 2024-05-14

## 2024-05-14 RX ORDER — FENTANYL CITRATE 50 UG/ML
100 INJECTION INTRAVENOUS ONCE
Refills: 0 | Status: DISCONTINUED | OUTPATIENT
Start: 2024-05-14 | End: 2024-05-14

## 2024-05-14 RX ORDER — SEVELAMER CARBONATE 2400 MG/1
800 POWDER, FOR SUSPENSION ORAL EVERY 8 HOURS
Refills: 0 | Status: DISCONTINUED | OUTPATIENT
Start: 2024-05-14 | End: 2024-05-17

## 2024-05-14 RX ORDER — MIDAZOLAM HYDROCHLORIDE 1 MG/ML
4 INJECTION, SOLUTION INTRAMUSCULAR; INTRAVENOUS ONCE
Refills: 0 | Status: DISCONTINUED | OUTPATIENT
Start: 2024-05-14 | End: 2024-05-14

## 2024-05-14 RX ADMIN — Medication 1 APPLICATION(S): at 05:05

## 2024-05-14 RX ADMIN — FENTANYL CITRATE 100 MICROGRAM(S): 50 INJECTION INTRAVENOUS at 15:30

## 2024-05-14 RX ADMIN — CHLORHEXIDINE GLUCONATE 15 MILLILITER(S): 213 SOLUTION TOPICAL at 05:05

## 2024-05-14 RX ADMIN — MEROPENEM 100 MILLIGRAM(S): 1 INJECTION INTRAVENOUS at 19:50

## 2024-05-14 RX ADMIN — Medication 4: at 17:53

## 2024-05-14 RX ADMIN — SODIUM CHLORIDE 4 MILLILITER(S): 9 INJECTION INTRAMUSCULAR; INTRAVENOUS; SUBCUTANEOUS at 22:45

## 2024-05-14 RX ADMIN — SEVELAMER CARBONATE 800 MILLIGRAM(S): 2400 POWDER, FOR SUSPENSION ORAL at 21:08

## 2024-05-14 RX ADMIN — Medication 2: at 23:54

## 2024-05-14 RX ADMIN — CISATRACURIUM BESYLATE 20 MILLIGRAM(S): 2 INJECTION INTRAVENOUS at 15:40

## 2024-05-14 RX ADMIN — Medication 2: at 05:05

## 2024-05-14 RX ADMIN — FENTANYL CITRATE 100 MICROGRAM(S): 50 INJECTION INTRAVENOUS at 15:31

## 2024-05-14 RX ADMIN — MIDAZOLAM HYDROCHLORIDE 4 MILLIGRAM(S): 1 INJECTION, SOLUTION INTRAMUSCULAR; INTRAVENOUS at 15:32

## 2024-05-14 RX ADMIN — DESMOPRESSIN ACETATE 215 MICROGRAM(S): 0.1 TABLET ORAL at 15:21

## 2024-05-14 RX ADMIN — SEVELAMER CARBONATE 800 MILLIGRAM(S): 2400 POWDER, FOR SUSPENSION ORAL at 05:05

## 2024-05-14 RX ADMIN — Medication 1 APPLICATION(S): at 17:53

## 2024-05-14 RX ADMIN — PROPOFOL 6.31 MICROGRAM(S)/KG/MIN: 10 INJECTION, EMULSION INTRAVENOUS at 19:51

## 2024-05-14 RX ADMIN — CHLORHEXIDINE GLUCONATE 1 APPLICATION(S): 213 SOLUTION TOPICAL at 05:06

## 2024-05-14 RX ADMIN — CHLORHEXIDINE GLUCONATE 15 MILLILITER(S): 213 SOLUTION TOPICAL at 18:01

## 2024-05-14 RX ADMIN — Medication 650 MILLIGRAM(S): at 05:05

## 2024-05-14 RX ADMIN — SODIUM CHLORIDE 4 MILLILITER(S): 9 INJECTION INTRAMUSCULAR; INTRAVENOUS; SUBCUTANEOUS at 06:30

## 2024-05-14 RX ADMIN — Medication 650 MILLIGRAM(S): at 21:07

## 2024-05-14 RX ADMIN — Medication 4.93 MICROGRAM(S)/KG/MIN: at 19:51

## 2024-05-14 NOTE — PROGRESS NOTE ADULT - SUBJECTIVE AND OBJECTIVE BOX
Patient is a 73y Male     Patient is a 73y old  Male who presents with a chief complaint of Unstable angina, abn EKG (14 May 2024 08:32)      HPI:  73-year-old male past medical history hypertension, ESRD on dialysis Monday Wednesday Friday, diabetes insulin-dependent presents with hiccups patient endorses persistent hiccups for the last 2 days. found to have peaked T waves, a new finding. denies dizziness, N/V, denies CP, palps, abd pain (29 Apr 2024 21:15)      PAST MEDICAL & SURGICAL HISTORY:  Diabetes      Benign essential HTN      HLD (hyperlipidemia)      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      ESRD on hemodialysis      Arteriovenous fistula          MEDICATIONS  (STANDING):  alteplase for catheter clearance 2 milliGRAM(s) Catheter once  aspirin  chewable 81 milliGRAM(s) Oral daily  chlorhexidine 0.12% Liquid 15 milliLiter(s) Oral Mucosa every 12 hours  chlorhexidine 2% Cloths 1 Application(s) Topical <User Schedule>  chlorhexidine 4% Liquid 1 Application(s) Topical <User Schedule>  dextrose 10% Bolus 125 milliLiter(s) IV Bolus once  dextrose 5%. 1000 milliLiter(s) (100 mL/Hr) IV Continuous <Continuous>  dextrose 5%. 1000 milliLiter(s) (50 mL/Hr) IV Continuous <Continuous>  dextrose 50% Injectable 12.5 Gram(s) IV Push once  dextrose 50% Injectable 25 Gram(s) IV Push once  epoetin reilly (EPOGEN) Injectable 42431 Unit(s) IV Push <User Schedule>  glucagon  Injectable 1 milliGRAM(s) IntraMuscular once  insulin lispro (ADMELOG) corrective regimen sliding scale   SubCutaneous every 6 hours  meropenem  IVPB 500 milliGRAM(s) IV Intermittent every 24 hours  norepinephrine Infusion 0.05 MICROgram(s)/kG/Min (4.93 mL/Hr) IV Continuous <Continuous>  petrolatum Ophthalmic Ointment 1 Application(s) Both EYES two times a day  propofol Infusion 19.987 MICROgram(s)/kG/Min (6.31 mL/Hr) IV Continuous <Continuous>  sevelamer carbonate 800 milliGRAM(s) Oral every 8 hours  sodium bicarbonate 650 milliGRAM(s) Oral every 8 hours  sodium chloride 3%  Inhalation 4 milliLiter(s) Inhalation every 12 hours      Allergies    No Known Allergies    Intolerances        SOCIAL HISTORY:  Denies ETOh,Smoking,     FAMILY HISTORY:  FHx: diabetes mellitus (Father, Aunt)        REVIEW OF SYSTEMS:    CONSTITUTIONAL: No weakness, fevers or chills  EYES/ENT: No visual changes;  No vertigo or throat pain   NECK: No pain or stiffness  RESPIRATORY: No cough, wheezing, hemoptysis; No shortness of breath  CARDIOVASCULAR: No chest pain or palpitations  GASTROINTESTINAL: No abdominal or epigastric pain. No nausea, vomiting, or hematemesis; No diarrhea or constipation. No melena or hematochezia.  GENITOURINARY: No dysuria, frequency or hematuria  NEUROLOGICAL: No numbness or weakness  SKIN: No itching, burning, rashes, or lesions   All other review of systems is negative unless indicated above.    VITAL:  T(C): , Max: 37.4 (05-14-24 @ 00:00)  T(F): , Max: 99.4 (05-14-24 @ 00:00)  HR: 70 (05-14-24 @ 08:00)  BP: 139/63 (05-14-24 @ 08:00)  BP(mean): 85 (05-14-24 @ 08:00)  RR: 20 (05-14-24 @ 08:00)  SpO2: 100% (05-14-24 @ 08:00)  Wt(kg): --    I and O's:    05-12 @ 07:01  -  05-13 @ 07:00  --------------------------------------------------------  IN: 657.1 mL / OUT: 0 mL / NET: 657.1 mL    05-13 @ 07:01  -  05-14 @ 07:00  --------------------------------------------------------  IN: 1777 mL / OUT: 800 mL / NET: 977 mL    05-14 @ 07:01  -  05-14 @ 08:58  --------------------------------------------------------  IN: 42 mL / OUT: 0 mL / NET: 42 mL          PHYSICAL EXAM:    Constitutional: NAD  HEENT: PERRLA,   Neck: No JVD  Respiratory: CTA B/L  Cardiovascular: S1 and S2  Gastrointestinal: BS+, soft, NT/ND  Extremities: No peripheral edema  Neurological: A/O x 3, no focal deficits  Psychiatric: Normal mood, normal affect  : No Abbasi  Skin: No rashes  Access: Not applicable  Back: No CVA tenderness    LABS:                        8.4    13.53 )-----------( 360      ( 14 May 2024 00:13 )             25.0     05-14    136  |  94<L>  |  79<H>  ----------------------------<  228<H>  3.8   |  19<L>  |  5.83<H>    Ca    8.4      14 May 2024 00:13  Phos  6.6     05-14  Mg     2.40     05-14    TPro  5.8<L>  /  Alb  2.7<L>  /  TBili  0.3  /  DBili  x   /  AST  14  /  ALT  9   /  AlkPhos  114  05-14          RADIOLOGY & ADDITIONAL STUDIES:

## 2024-05-14 NOTE — PROGRESS NOTE ADULT - ASSESSMENT
74 y/o M PMhx HTN, ESRD (on HD M/W/F), DM who presented with hiccups x 2 days and chest pain found to have peaked T waves. Hospital course c/b AMS s/p RRT on 5/1 and 5/2. Vascular consulted 2/2 RUE AVF access unable to be used s/p R femoral shiley placed for emergent HD. Transferred to MICU and intubated 5/2 for airway protection.     sepsis  fevers noted since 5/10  blood cx 5/10 w/ Bacillus cereus in 1/4 bottles  unclear whether true pathogen, pt also w/ fever, leukocytosis  B. cereus usually associated w/ GI source, however, has been implicated in central line infections  repeat blood cultures 5/11- NGTD   CT abd/pelvs- Nonocclusive R common iliac vein deep venous thrombosis. elevated R hemidiaphragm and RLL atelectasis  leukocytosis improving    AMS, CVA  TTE- no evidence of valvular disease  s/p LP 5/2, cell count not consistent w/ bacterial meningitis, CSF PCR- negative  MRI- Punctate foci of restricted diffusion in the left talya and left cerebellum with associated T2 prolongation consistent with acute infarcts  noted tentative plan for trach and PEG    Recommendations  complete 10 day course of vanc for B cereus bacteremia w/ last day 5/20  repeat random vanc level 5/15 AM (prior to HD)  - dose by level post-HD  c/w meropenem to complete 5 days w/ last day today    Steven Torres M.D.  [facility], Division of Infectious Diseases  [phone]  After 5pm on weekdays and all day on weekends - please call [phone]

## 2024-05-14 NOTE — CHART NOTE - NSCHARTNOTEFT_GEN_A_CORE
CRITICAL  CARE NUTRITION FOLLOW-UP    74 y/o M w Hx of ESRD on HD, HLD, HTN, IDDM presenting with hiccups and chest pain. Found to have distended gallbladder 2/2 gastroparesis, started on Reglan.  S/p RRT for AMS & transferred to MICU for encephalopathy requiring intubation for airway protection. Pt. with unstable angina, acute hypoxic respiratory failure, aspiration PNA, acute ischemic stroke and on HD.  Pt. intubated then extubated (5/9), but reintubated NPO for tracheostomy placement today (5/14).  Remains sedated & ~419 non-nutritive lipid calories provided by Propofol over past 24hrs.      Prior to NPO status tolearted enteral feeding with Nepro @ 45mL/hr x 18rs. No noted emesis/diarrhea/constipation @ this time.  Last bowel movement (5/14).    T F Provides:  810mL total volume  1458 KCals + (300 Kcals from LPS)=1758 Kcals (+ Propofol KCals).  66g protein (+ 45g additional protein via LPS)= 111g total protein.    Would resume this enteral regimen once medically appropriate, as it meets estimated energy needs.    No significant weight change @ this time.          _________________Diet___________________  Diet, NPO after Midnight:      NPO Start Date: 13-May-2024,   NPO Start Time: 23:59 (05-13-24 @ 20:01) [Active]    Diet, NPO with Tube Feed:   Tube Feeding Modality: Orogastric  Nepro with Carb Steady (NEPRORTH)  Total Volume for 24 Hours (mL): 1080  Continuous  Starting Tube Feed Rate {mL per Hour}: 10  Increase Tube Feed Rate by (mL): 35     Every hour  Until Goal Tube Feed Rate (mL per Hour): 45  Tube Feed Duration (in Hours): 24  Tube Feed Start Time: 11:00  Tube Feed Stop Time: 05:00  Liquid Protein Supplement     Qty per Day:  3 (05-13-24 @ 09:39) [Active]      Weight:                    Height:  68" / 172.7cm          Ideal Body Weight: 154lbs /70kg  50.0kg (5/9)  52.2kg (5/5)  52.6kg (4/29 on admit)      _____Estimated Energy Needs (based on IBW)_____  25-30 KCals/kg = 1750 -2100KCals/d  1.4-1.6g protein/kg = 98-112g protein/d      Edema: 1+ generalized  Skin: No pressure injuries        ________________________Pertinent Medications____________   MEDICATIONS  (STANDING):  alteplase for catheter clearance 2 milliGRAM(s) Catheter once  aspirin  chewable 81 milliGRAM(s) Oral daily  chlorhexidine 0.12% Liquid 15 milliLiter(s) Oral Mucosa every 12 hours  chlorhexidine 2% Cloths 1 Application(s) Topical <User Schedule>  chlorhexidine 4% Liquid 1 Application(s) Topical <User Schedule>  dextrose 10% Bolus 125 milliLiter(s) IV Bolus once  dextrose 5%. 1000 milliLiter(s) (50 mL/Hr) IV Continuous <Continuous>  dextrose 5%. 1000 milliLiter(s) (100 mL/Hr) IV Continuous <Continuous>  dextrose 50% Injectable 12.5 Gram(s) IV Push once  dextrose 50% Injectable 25 Gram(s) IV Push once  epoetin reilly (EPOGEN) Injectable 77796 Unit(s) IV Push <User Schedule>  glucagon  Injectable 1 milliGRAM(s) IntraMuscular once  insulin lispro (ADMELOG) corrective regimen sliding scale   SubCutaneous every 6 hours  meropenem  IVPB 500 milliGRAM(s) IV Intermittent every 24 hours  norepinephrine Infusion 0.05 MICROgram(s)/kG/Min (4.93 mL/Hr) IV Continuous <Continuous>  petrolatum Ophthalmic Ointment 1 Application(s) Both EYES two times a day  propofol Infusion 19.987 MICROgram(s)/kG/Min (6.31 mL/Hr) IV Continuous <Continuous>  sevelamer carbonate Powder 800 milliGRAM(s) Oral every 8 hours  sodium bicarbonate 650 milliGRAM(s) Oral every 8 hours  sodium chloride 3%  Inhalation 4 milliLiter(s) Inhalation every 12 hours    MEDICATIONS  (PRN):  dextrose Oral Gel 15 Gram(s) Oral once PRN Blood Glucose LESS THAN 70 milliGRAM(s)/deciliter  sodium chloride 0.9% lock flush 10 milliLiter(s) IV Push every 1 hour PRN Pre/post blood products, medications, blood draw, and to maintain line patency          _________________________Pertinent Labs____________________     05-14    136  |  94<L>  |  79<H>  ----------------------------<  228<H>  3.8   |  19<L>  |  5.83<H>    Ca    8.4      14 May 2024 00:13  Phos  6.6     05-14  Mg     2.40     05-14    TPro  5.8<L>  /  Alb  2.7<L>  /  TBili  0.3  /  DBili  x   /  AST  14  /  ALT  9   /  AlkPhos  114  05-14                                                                   8.4    13.53 )-----------( 360      ( 14 May 2024 00:13 )             25.0         CAPILLARY BLOOD GLUCOSE      POCT Blood Glucose.: 253 mg/dL (14 May 2024 14:24)    POCT Blood Glucose.: 253 mg/dL (05-14-24 @ 14:24)  POCT Blood Glucose.: 230 mg/dL (05-14-24 @ 11:43)  POCT Blood Glucose.: 192 mg/dL (05-14-24 @ 05:04)  POCT Blood Glucose.: 233 mg/dL (05-13-24 @ 23:33)  POCT Blood Glucose.: 212 mg/dL (05-13-24 @ 17:19)        ________NUTRITION Dx_________    Pt. remains severely malnourished       PLAN/RECOMMENDATIONS:    1) Resume Nepro @ 45mL/hr x 18hrs + Liquid Protein Supplement (LPS) 3 packets per day, as medically appropriate  2) Obtain daily & pre/post HD weights  3) Monitor tolerance to TF, GI status, electrolytes, glucose     RDN remains available and will f/u PRN.          Janet Peñaloza, RDN, CDN       pager [phone] or MS Teams

## 2024-05-14 NOTE — CHART NOTE - NSCHARTNOTEFT_GEN_A_CORE
: Swapnil    INDICATION: Percutaneous Tracheostomy    PROCEDURE:  [ ] LIMITED ECHO  [ ] LIMITED CHEST  [ ] LIMITED RETROPERITONEAL  [ ] LIMITED ABDOMINAL  [ ] LIMITED DVT  [ ] NEEDLE GUIDANCE VASCULAR  [ ] NEEDLE GUIDANCE THORACENTESIS  [ ] NEEDLE GUIDANCE PARACENTESIS  [ ] NEEDLE GUIDANCE PERICARDIOCENTESIS  [X] LIMITED NECK    FINDINGS: The thyroid cartilage, cricoid cartilage and tracheal rings were located. The thyroid and isthmus was located. No high riding vessel noted. No vessels noted in the field of the percutaneous tracheostomy.     INTERPRETATION: No contraindications to proceeding with percutaneous tracheostomy.     Images stored on [facility].

## 2024-05-14 NOTE — PROGRESS NOTE ADULT - SUBJECTIVE AND OBJECTIVE BOX
Progress Note    JIMMY Glasgow MD 73y (1950) Male [id number]  04-29-24 (15d)      Chief Complaint: Unstable angina, abn EKG    Subjective:  No acute events overnight. Patient seen and examined at bedside.    Review of Systems:  CONSTITUTIONAL: No fever, weight loss, or fatigue  EYES: No eye pain, visual disturbances, or discharge  ENMT:  No difficulty hearing, tinnitus, vertigo; No sinus or throat pain  NECK: No pain or stiffness  RESPIRATORY: No cough, wheezing, chills or hemoptysis; No shortness of breath  CARDIOVASCULAR: No chest pain, palpitations, dizziness, or leg swelling  GASTROINTESTINAL: No abdominal or epigastric pain. No nausea, vomiting, or hematemesis; No diarrhea or constipation. No melena or hematochezia.  GENITOURINARY: No dysuria, frequency, hematuria, or incontinence  NEUROLOGICAL: No headaches, memory loss, loss of strength, numbness, or tremors  SKIN: No itching, burning, rashes, or lesions   MUSCULOSKELETAL: No joint pain or swelling; No muscle, back, or extremity pain      PAST MEDICAL & SURGICAL HISTORY:  Diabetes [250.00]    Stage 5 chronic kidney disease not on chronic dialysis [N18.5]    Benign essential HTN [I10]    HLD (hyperlipidemia) [E78.5]    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 [N18.6]    ESRD on hemodialysis [N18.6]    No significant past surgical history [017087848]    AVF (arteriovenous fistula) [I77.0]    Refined to: Arteriovenous fistula    Arteriovenous fistula [I77.0]      alteplase for catheter clearance 2 milliGRAM(s) Catheter once  aspirin  chewable 81 milliGRAM(s) Oral daily  chlorhexidine 0.12% Liquid 15 milliLiter(s) Oral Mucosa every 12 hours  chlorhexidine 2% Cloths 1 Application(s) Topical <User Schedule>  chlorhexidine 4% Liquid 1 Application(s) Topical <User Schedule>  dextrose 10% Bolus 125 milliLiter(s) IV Bolus once  dextrose 5%. 1000 milliLiter(s) IV Continuous <Continuous>  dextrose 5%. 1000 milliLiter(s) IV Continuous <Continuous>  dextrose 50% Injectable 12.5 Gram(s) IV Push once  dextrose 50% Injectable 25 Gram(s) IV Push once  dextrose Oral Gel 15 Gram(s) Oral once PRN  epoetin reilly (EPOGEN) Injectable 88665 Unit(s) IV Push <User Schedule>  glucagon  Injectable 1 milliGRAM(s) IntraMuscular once  insulin lispro (ADMELOG) corrective regimen sliding scale   SubCutaneous every 6 hours  meropenem  IVPB 500 milliGRAM(s) IV Intermittent every 24 hours  norepinephrine Infusion 0.05 MICROgram(s)/kG/Min IV Continuous <Continuous>  petrolatum Ophthalmic Ointment 1 Application(s) Both EYES two times a day  propofol Infusion 19.987 MICROgram(s)/kG/Min IV Continuous <Continuous>  sevelamer carbonate 800 milliGRAM(s) Oral every 8 hours  sodium bicarbonate 650 milliGRAM(s) Oral every 8 hours  sodium chloride 0.9% lock flush 10 milliLiter(s) IV Push every 1 hour PRN  sodium chloride 3%  Inhalation 4 milliLiter(s) Inhalation every 12 hours    Objective:  T(C): 36.9 (05-14-24 @ 04:00), Max: 37.4 (05-14-24 @ 00:00)  HR: 71 (05-14-24 @ 06:28) (54 - 91)  BP: 136/59 (05-14-24 @ 06:00) (84/36 - 177/43)  RR: 20 (05-14-24 @ 06:00) (18 - 20)  SpO2: 100% (05-14-24 @ 06:28) (93% - 100%)    Physical exam:  GENERAL: NAD, well-developed  HEAD:  Atraumatic, Normocephalic  EYES: EOMI, PERRLA, conjunctiva and sclera clear  NECK: Supple, No JVD  CHEST/LUNG: Clear to auscultation bilaterally; No wheeze  HEART: Regular rate and rhythm; No murmurs, rubs, or gallops  ABDOMEN: Soft, Nontender, Nondistended; Bowel sounds present  EXTREMITIES:  2+ Peripheral Pulses, No clubbing, cyanosis, or edema  PSYCH: AAOx3  NEUROLOGY: non-focal  SKIN: No rashes or lesions      05-12-24 @ 07:01  -  05-13-24 @ 07:00  --------------------------------------------------------  IN: 657.1 mL / OUT: 0 mL / NET: 657.1 mL    05-13-24 @ 07:01  -  05-14-24 @ 06:45  --------------------------------------------------------  IN: 1756.2 mL / OUT: 800 mL / NET: 956.2 mL        CAPILLARY BLOOD GLUCOSE      (05-14 @ 00:13)                      8.4  13.53 )-----------( 360                 25.0    Neutrophils = 12.29 (90.8%)  Lymphocytes = 0.55 (4.1%)  Eosinophils = 0.08 (0.6%)  Basophils = 0.01 (0.1%)  Monocytes = 0.54 (4.0%)  Bands = --%    05-14    136  |  94<L>  |  79<H>  ----------------------------<  228<H>  3.8   |  19<L>  |  5.83<H>    Ca    8.4      14 May 2024 00:13  Phos  6.6     05-14  Mg     2.40     05-14    TPro  5.8<L>  /  Alb  2.7<L>  /  TBili  0.3  /  DBili  x   /  AST  14  /  ALT  9   /  AlkPhos  114  05-14    ( 14 May 2024 00:13 )   PT: 12.7 sec;   INR: 1.14 ratio;       PTT:45.2 sec      RVP:    Venous Blood Gas:  05-14 @ 00:13  7.43/32/46/21/75.8  VBG Lactate: 1.6        Tox:         Urinalysis Basic - ( 14 May 2024 00:13 )    Color: x / Appearance: x / SG: x / pH: x  Gluc: 228 mg/dL / Ketone: x  / Bili: x / Urobili: x   Blood: x / Protein: x / Nitrite: x   Leuk Esterase: x / RBC: x / WBC x   Sq Epi: x / Non Sq Epi: x / Bacteria: x        WBC Trend: 13.53<--, 21.36<--, 31.30<--    Hb Trend: 8.4<--, 8.6<--, 9.4<--, 12.4<--, 10.3<--        New imaging in last 24 hours:  Consult notes reviewed: Progress Note    JIMMY Glasgow MD 73y (1950) Male [id number]  04-29-24 (15d)      Chief Complaint: Unstable angina, abn EKG    Subjective:  No acute events overnight.   Sedated on vent    Review of Systems:  Unable to obtain.       PAST MEDICAL & SURGICAL HISTORY:  Diabetes [250.00]    Stage 5 chronic kidney disease not on chronic dialysis [N18.5]    Benign essential HTN [I10]    HLD (hyperlipidemia) [E78.5]    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 [N18.6]    ESRD on hemodialysis [N18.6]    No significant past surgical history [126430945]    AVF (arteriovenous fistula) [I77.0]    Refined to: Arteriovenous fistula    Arteriovenous fistula [I77.0]      alteplase for catheter clearance 2 milliGRAM(s) Catheter once  aspirin  chewable 81 milliGRAM(s) Oral daily  chlorhexidine 0.12% Liquid 15 milliLiter(s) Oral Mucosa every 12 hours  chlorhexidine 2% Cloths 1 Application(s) Topical <User Schedule>  chlorhexidine 4% Liquid 1 Application(s) Topical <User Schedule>  dextrose 10% Bolus 125 milliLiter(s) IV Bolus once  dextrose 5%. 1000 milliLiter(s) IV Continuous <Continuous>  dextrose 5%. 1000 milliLiter(s) IV Continuous <Continuous>  dextrose 50% Injectable 12.5 Gram(s) IV Push once  dextrose 50% Injectable 25 Gram(s) IV Push once  dextrose Oral Gel 15 Gram(s) Oral once PRN  epoetin reilly (EPOGEN) Injectable 24838 Unit(s) IV Push <User Schedule>  glucagon  Injectable 1 milliGRAM(s) IntraMuscular once  insulin lispro (ADMELOG) corrective regimen sliding scale   SubCutaneous every 6 hours  meropenem  IVPB 500 milliGRAM(s) IV Intermittent every 24 hours  norepinephrine Infusion 0.05 MICROgram(s)/kG/Min IV Continuous <Continuous>  petrolatum Ophthalmic Ointment 1 Application(s) Both EYES two times a day  propofol Infusion 19.987 MICROgram(s)/kG/Min IV Continuous <Continuous>  sevelamer carbonate 800 milliGRAM(s) Oral every 8 hours  sodium bicarbonate 650 milliGRAM(s) Oral every 8 hours  sodium chloride 0.9% lock flush 10 milliLiter(s) IV Push every 1 hour PRN  sodium chloride 3%  Inhalation 4 milliLiter(s) Inhalation every 12 hours    Objective:  T(C): 36.9 (05-14-24 @ 04:00), Max: 37.4 (05-14-24 @ 00:00)  HR: 71 (05-14-24 @ 06:28) (54 - 91)  BP: 136/59 (05-14-24 @ 06:00) (84/36 - 177/43)  RR: 20 (05-14-24 @ 06:00) (18 - 20)  SpO2: 100% (05-14-24 @ 06:28) (93% - 100%)    Physical exam:  GENERAL: NAD, cachetic  HEAD:  Atraumatic, Normocephalic  EYES: EOMI, PERRLA, conjunctiva and sclera clear  NECK: Supple, No JVD  CHEST/LUNG: Clear to auscultation bilaterally; No wheeze; on vent  HEART: Regular rate and rhythm; No murmurs, rubs, or gallops  ABDOMEN: Soft, Nontender, Nondistended; Bowel sounds present  EXTREMITIES:  2+ Peripheral Pulses, No clubbing, cyanosis, or edema  PSYCH: sedated  NEUROLOGY: non-focal  SKIN: No rashes or lesions      05-12-24 @ 07:01  -  05-13-24 @ 07:00  --------------------------------------------------------  IN: 657.1 mL / OUT: 0 mL / NET: 657.1 mL    05-13-24 @ 07:01  -  05-14-24 @ 06:45  --------------------------------------------------------  IN: 1756.2 mL / OUT: 800 mL / NET: 956.2 mL        CAPILLARY BLOOD GLUCOSE      (05-14 @ 00:13)                      8.4  13.53 )-----------( 360                 25.0    Neutrophils = 12.29 (90.8%)  Lymphocytes = 0.55 (4.1%)  Eosinophils = 0.08 (0.6%)  Basophils = 0.01 (0.1%)  Monocytes = 0.54 (4.0%)  Bands = --%    05-14    136  |  94<L>  |  79<H>  ----------------------------<  228<H>  3.8   |  19<L>  |  5.83<H>    Ca    8.4      14 May 2024 00:13  Phos  6.6     05-14  Mg     2.40     05-14    TPro  5.8<L>  /  Alb  2.7<L>  /  TBili  0.3  /  DBili  x   /  AST  14  /  ALT  9   /  AlkPhos  114  05-14    ( 14 May 2024 00:13 )   PT: 12.7 sec;   INR: 1.14 ratio;       PTT:45.2 sec      RVP:    Venous Blood Gas:  05-14 @ 00:13  7.43/32/46/21/75.8  VBG Lactate: 1.6        Tox:         Urinalysis Basic - ( 14 May 2024 00:13 )    Color: x / Appearance: x / SG: x / pH: x  Gluc: 228 mg/dL / Ketone: x  / Bili: x / Urobili: x   Blood: x / Protein: x / Nitrite: x   Leuk Esterase: x / RBC: x / WBC x   Sq Epi: x / Non Sq Epi: x / Bacteria: x        WBC Trend: 13.53<--, 21.36<--, 31.30<--    Hb Trend: 8.4<--, 8.6<--, 9.4<--, 12.4<--, 10.3<--        New imaging in last 24 hours:  Consult notes reviewed:

## 2024-05-14 NOTE — PROGRESS NOTE ADULT - SUBJECTIVE AND OBJECTIVE BOX
Patient is a 73y old  Male who presents with a chief complaint of Unstable angina, abn EKG (14 May 2024 15:24)      SUBJECTIVE / OVERNIGHT EVENTS: remains sedated, intubated, on pressors s/p B.cereus sepsis, on Vanc, meropenem. awaiting trache    MEDICATIONS  (STANDING):  alteplase for catheter clearance 2 milliGRAM(s) Catheter once  aspirin  chewable 81 milliGRAM(s) Oral daily  chlorhexidine 0.12% Liquid 15 milliLiter(s) Oral Mucosa every 12 hours  chlorhexidine 2% Cloths 1 Application(s) Topical <User Schedule>  chlorhexidine 4% Liquid 1 Application(s) Topical <User Schedule>  dextrose 10% Bolus 125 milliLiter(s) IV Bolus once  dextrose 5%. 1000 milliLiter(s) (50 mL/Hr) IV Continuous <Continuous>  dextrose 5%. 1000 milliLiter(s) (100 mL/Hr) IV Continuous <Continuous>  dextrose 50% Injectable 12.5 Gram(s) IV Push once  dextrose 50% Injectable 25 Gram(s) IV Push once  epoetin reilly (EPOGEN) Injectable 84047 Unit(s) IV Push <User Schedule>  glucagon  Injectable 1 milliGRAM(s) IntraMuscular once  insulin lispro (ADMELOG) corrective regimen sliding scale   SubCutaneous every 6 hours  meropenem  IVPB 500 milliGRAM(s) IV Intermittent every 24 hours  norepinephrine Infusion 0.05 MICROgram(s)/kG/Min (4.93 mL/Hr) IV Continuous <Continuous>  petrolatum Ophthalmic Ointment 1 Application(s) Both EYES two times a day  propofol Infusion 19.987 MICROgram(s)/kG/Min (6.31 mL/Hr) IV Continuous <Continuous>  sevelamer carbonate Powder 800 milliGRAM(s) Oral every 8 hours  sodium bicarbonate 650 milliGRAM(s) Oral every 8 hours  sodium chloride 3%  Inhalation 4 milliLiter(s) Inhalation every 12 hours    MEDICATIONS  (PRN):  dextrose Oral Gel 15 Gram(s) Oral once PRN Blood Glucose LESS THAN 70 milliGRAM(s)/deciliter  sodium chloride 0.9% lock flush 10 milliLiter(s) IV Push every 1 hour PRN Pre/post blood products, medications, blood draw, and to maintain line patency      Vital Signs Last 24 Hrs  T(F): 99.2 (05-14-24 @ 16:00), Max: 99.4 (05-14-24 @ 00:00)  HR: 69 (05-14-24 @ 21:00) (62 - 95)  BP: 157/59 (05-14-24 @ 21:00) (94/57 - 177/43)  RR: 20 (05-14-24 @ 21:00) (11 - 20)  SpO2: 100% (05-14-24 @ 21:00) (93% - 100%)  Telemetry:   CAPILLARY BLOOD GLUCOSE      POCT Blood Glucose.: 247 mg/dL (14 May 2024 17:47)  POCT Blood Glucose.: 253 mg/dL (14 May 2024 14:24)  POCT Blood Glucose.: 230 mg/dL (14 May 2024 11:43)  POCT Blood Glucose.: 192 mg/dL (14 May 2024 05:04)  POCT Blood Glucose.: 233 mg/dL (13 May 2024 23:33)    I&O's Summary    13 May 2024 07:01  -  14 May 2024 07:00  --------------------------------------------------------  IN: 1777 mL / OUT: 800 mL / NET: 977 mL    14 May 2024 07:01  -  14 May 2024 21:55  --------------------------------------------------------  IN: 295.2 mL / OUT: 0 mL / NET: 295.2 mL        PHYSICAL EXAM:  GENERAL: NAD, well-developed  HEAD:  Atraumatic, Normocephalic  EYES: EOMI, PERRLA, conjunctiva and sclera clear  NECK: Supple, No JVD  CHEST/LUNG: Clear to auscultation bilaterally; No wheeze  HEART: Regular rate and rhythm; No murmurs, rubs, or gallops  ABDOMEN: Soft, Nontender, Nondistended; Bowel sounds present  EXTREMITIES:  2+ Peripheral Pulses, No clubbing, cyanosis, or edema  PSYCH: AAOx3  NEUROLOGY: non-focal  SKIN: No rashes or lesions    LABS:                        8.4    13.53 )-----------( 360      ( 14 May 2024 00:13 )             25.0     05-14    136  |  94<L>  |  79<H>  ----------------------------<  228<H>  3.8   |  19<L>  |  5.83<H>    Ca    8.4      14 May 2024 00:13  Phos  6.6     05-14  Mg     2.40     05-14    TPro  5.8<L>  /  Alb  2.7<L>  /  TBili  0.3  /  DBili  x   /  AST  14  /  ALT  9   /  AlkPhos  114  05-14    PT/INR - ( 14 May 2024 00:13 )   PT: 12.7 sec;   INR: 1.14 ratio         PTT - ( 14 May 2024 00:13 )  PTT:45.2 sec      Urinalysis Basic - ( 14 May 2024 00:13 )    Color: x / Appearance: x / SG: x / pH: x  Gluc: 228 mg/dL / Ketone: x  / Bili: x / Urobili: x   Blood: x / Protein: x / Nitrite: x   Leuk Esterase: x / RBC: x / WBC x   Sq Epi: x / Non Sq Epi: x / Bacteria: x        RADIOLOGY & ADDITIONAL TESTS:    Imaging Personally Reviewed:    Consultant(s) Notes Reviewed:      Care Discussed with Consultants/Other Providers:

## 2024-05-14 NOTE — PROGRESS NOTE ADULT - SUBJECTIVE AND OBJECTIVE BOX
[facility] [place] [facility]   MD ELIANE BHAGAT MD MARIA SANTIAGO, NP    TEL:  OFFICE: [phone]  From 5pm-7am Answering Service [phone]    -- RENAL FOLLOW UP NOTE ---Date of Service 05-14-24 @ 15:24    Patient is a 73y old  Male who presents with a chief complaint of Unstable angina, abn EKG     Patient seen and examined in ICU. Patient intubated in no apparent distress.    VITALS:  T(F): 98.9 (05-14-24 @ 08:00), Max: 99.4 (05-14-24 @ 00:00)  HR: 68 (05-14-24 @ 11:00)  BP: 122/60 (05-14-24 @ 11:00)  RR: 20 (05-14-24 @ 11:00)  SpO2: 100% (05-14-24 @ 11:00)  Wt(kg): --    05-13 @ 07:01  -  05-14 @ 07:00  --------------------------------------------------------  IN: 1777 mL / OUT: 800 mL / NET: 977 mL    05-14 @ 07:01  -  05-14 @ 15:24  --------------------------------------------------------  IN: 63 mL / OUT: 0 mL / NET: 63 mL          PHYSICAL EXAM:  General: NAD  Neck: No JVD  Respiratory: intubated  Cardiovascular: S1, S2, RRR  Gastrointestinal: BS+, soft, NT/ND  Extremities: No peripheral edema    Hospital Medications:   MEDICATIONS  (STANDING):  alteplase for catheter clearance 2 milliGRAM(s) Catheter once  aspirin  chewable 81 milliGRAM(s) Oral daily  chlorhexidine 0.12% Liquid 15 milliLiter(s) Oral Mucosa every 12 hours  chlorhexidine 2% Cloths 1 Application(s) Topical <User Schedule>  chlorhexidine 4% Liquid 1 Application(s) Topical <User Schedule>  dextrose 10% Bolus 125 milliLiter(s) IV Bolus once  dextrose 5%. 1000 milliLiter(s) (100 mL/Hr) IV Continuous <Continuous>  dextrose 5%. 1000 milliLiter(s) (50 mL/Hr) IV Continuous <Continuous>  dextrose 50% Injectable 12.5 Gram(s) IV Push once  dextrose 50% Injectable 25 Gram(s) IV Push once  epoetin reilly (EPOGEN) Injectable 38816 Unit(s) IV Push <User Schedule>  glucagon  Injectable 1 milliGRAM(s) IntraMuscular once  insulin lispro (ADMELOG) corrective regimen sliding scale   SubCutaneous every 6 hours  meropenem  IVPB 500 milliGRAM(s) IV Intermittent every 24 hours  norepinephrine Infusion 0.05 MICROgram(s)/kG/Min (4.93 mL/Hr) IV Continuous <Continuous>  petrolatum Ophthalmic Ointment 1 Application(s) Both EYES two times a day  propofol Infusion 19.987 MICROgram(s)/kG/Min (6.31 mL/Hr) IV Continuous <Continuous>  sevelamer carbonate Powder 800 milliGRAM(s) Oral every 8 hours  sodium bicarbonate 650 milliGRAM(s) Oral every 8 hours  sodium chloride 3%  Inhalation 4 milliLiter(s) Inhalation every 12 hours      LABS:  05-14    136  |  94<L>  |  79<H>  ----------------------------<  228<H>  3.8   |  19<L>  |  5.83<H>    Ca    8.4      14 May 2024 00:13  Phos  6.6     05-14  Mg     2.40     05-14    TPro  5.8<L>  /  Alb  2.7<L>  /  TBili  0.3  /  DBili      /  AST  14  /  ALT  9   /  AlkPhos  114  05-14    Creatinine Trend: 5.83 <--, 8.00 <--, 7.97 <--, 6.68 <--, 4.24 <--, 4.23 <--, 3.99 <--, 5.81 <--, 3.94 <--    Albumin: 2.7 g/dL (05-14 @ 00:13)  Phosphorus: 6.6 mg/dL (05-14 @ 00:13)                              8.4    13.53 )-----------( 360      ( 14 May 2024 00:13 )             25.0     Urine Studies:  Urinalysis - [05-14-24 @ 00:13]      Color  / Appearance  / SG  / pH       Gluc 228 / Ketone   / Bili  / Urobili        Blood  / Protein  / Leuk Est  / Nitrite       RBC  / WBC  / Hyaline  / Gran  / Sq Epi  / Non Sq Epi  / Bacteria       Iron 47, TIBC --, %sat --      [05-01-24 @ 06:44]  TSH 2.79      [04-30-24 @ 06:03]    HBsAb <3.0      [05-01-24 @ 14:42]  HBcAb Nonreact      [05-01-24 @ 14:42]      RADIOLOGY & ADDITIONAL STUDIES:

## 2024-05-14 NOTE — PROGRESS NOTE ADULT - ASSESSMENT
73-year-old male past medical history hypertension, ESRD on dialysis Monday Wednesday Friday, diabetes insulin-dependent presents with hiccups patient endorses persistent hiccups for the last 2 days. Nephrology consulted for HD needs.    A/P  ESRD:  Center: [place]  Nephrologist: Dr. Hardwick  Access: R AVF  MWF schedule  Vascular for fistulogram   s/p femoral shiley on 5/1, now removed  s/p placement of IJ shiley 5/3  Stopped CRRT   Restarted IHD  s/p HD 5/7 UF 1778; treatment terminated early due to hypotension   S/P MRA head/neck w/ gadolinium --> Received HD on 5/9 and 5/10.  5/10 Will need to dialyze 3 days consecutively--> plan for HD on 5/11  5/10 Spoke to ICU; will UF net even tomorrow  5/11 shiley removed due to bacteremia; did not receive HD.  Line holiday  5/12 - BUN worsened; K up trending.  D/W ICU will need shiley and plan to dialyze tomorrow.  5/13 Shiley placed, s/p HD   On vancomycin - adjust dose per vanco trough level;   Consent obtained and placed in ED chart  Renal diet  Monitor BMP    Hyperkalemia  Up trending.  Lokelma 10gm x2 doses 5/12  Stable  HD 5/13  Low K diet.  Monitor closely     HTN:  BP fluctuating.  Back on pressors and antihypertensives.  Care per ICU.  Monitor BP.    Anemia:  SILVA w/ HD; hold for Hgb >11.  Monitor Hb.    CKD-MBD  Check PTH  PO4 fluctuating  Consider sevelamer 1600mg TID.  Low PO4 diet.  Monitor Ca, PO4 daily    Hypocalcemia:  In setting of CKD vs. hypoalbuminemia.  Optimize albumin.  Improving.  Monitor Ca.    D/W ICU team  73-year-old male past medical history hypertension, ESRD on dialysis Monday Wednesday Friday, diabetes insulin-dependent presents with hiccups patient endorses persistent hiccups for the last 2 days. Nephrology consulted for HD needs.    A/P  ESRD:  Center: [place]  Nephrologist: Dr. Hardwick  Access: R AVF  MWF schedule  Vascular for fistulogram   s/p femoral shiley on 5/1, now removed  s/p placement of IJ shiley 5/3  Stopped CRRT   Restarted IHD  s/p HD 5/7 UF 1778; treatment terminated early due to hypotension   S/P MRA head/neck w/ gadolinium --> Received HD on 5/9 and 5/10.  5/10 Will need to dialyze 3 days consecutively--> plan for HD on 5/11  5/10 Spoke to ICU; will UF net even tomorrow  5/11 shiley removed due to bacteremia; did not receive HD.  Line holiday  5/12 - BUN worsened; K up trending.    5/13 Shiley placed, s/p HD   Next HD tomorrow  Consent obtained and placed in ED chart  Renal diet  Monitor BMP    Hyperkalemia  Up trending.  Lokelma 10gm x2 doses 5/12  Stable  HD 5/13  Low K diet.  Monitor closely     HTN:  BP fluctuating.  Back on pressors and antihypertensives.  Care per ICU.  Monitor BP.    Anemia:  SILVA w/ HD; hold for Hgb >11.  Monitor Hb.    CKD-MBD  Check PTH  PO4 fluctuating  Consider sevelamer 1600mg TID.  Low PO4 diet.  Monitor Ca, PO4 daily    Hypocalcemia:  In setting of CKD vs. hypoalbuminemia.  Optimize albumin.  Improving.  Monitor Ca.    D/W ICU team

## 2024-05-14 NOTE — PROGRESS NOTE ADULT - SUBJECTIVE AND OBJECTIVE BOX
Neurology Progress Note    S: Patient seen and examined. Remains sedated      Medications: MEDICATIONS  (STANDING):  alteplase for catheter clearance 2 milliGRAM(s) Catheter once  aspirin  chewable 81 milliGRAM(s) Oral daily  chlorhexidine 0.12% Liquid 15 milliLiter(s) Oral Mucosa every 12 hours  chlorhexidine 2% Cloths 1 Application(s) Topical <User Schedule>  chlorhexidine 4% Liquid 1 Application(s) Topical <User Schedule>  dextrose 10% Bolus 125 milliLiter(s) IV Bolus once  dextrose 5%. 1000 milliLiter(s) (50 mL/Hr) IV Continuous <Continuous>  dextrose 5%. 1000 milliLiter(s) (100 mL/Hr) IV Continuous <Continuous>  dextrose 50% Injectable 12.5 Gram(s) IV Push once  dextrose 50% Injectable 25 Gram(s) IV Push once  epoetin reilly (EPOGEN) Injectable 79048 Unit(s) IV Push <User Schedule>  glucagon  Injectable 1 milliGRAM(s) IntraMuscular once  insulin lispro (ADMELOG) corrective regimen sliding scale   SubCutaneous every 6 hours  meropenem  IVPB 500 milliGRAM(s) IV Intermittent every 24 hours  norepinephrine Infusion 0.05 MICROgram(s)/kG/Min (4.93 mL/Hr) IV Continuous <Continuous>  petrolatum Ophthalmic Ointment 1 Application(s) Both EYES two times a day  propofol Infusion 19.987 MICROgram(s)/kG/Min (6.31 mL/Hr) IV Continuous <Continuous>  sevelamer carbonate Powder 800 milliGRAM(s) Oral every 8 hours  sodium bicarbonate 650 milliGRAM(s) Oral every 8 hours  sodium chloride 3%  Inhalation 4 milliLiter(s) Inhalation every 12 hours    MEDICATIONS  (PRN):  dextrose Oral Gel 15 Gram(s) Oral once PRN Blood Glucose LESS THAN 70 milliGRAM(s)/deciliter  sodium chloride 0.9% lock flush 10 milliLiter(s) IV Push every 1 hour PRN Pre/post blood products, medications, blood draw, and to maintain line patency       Vitals:  Vital Signs Last 24 Hrs  T(C): 37.3 (14 May 2024 16:00), Max: 37.4 (14 May 2024 00:00)  T(F): 99.2 (14 May 2024 16:00), Max: 99.4 (14 May 2024 00:00)  HR: 69 (14 May 2024 19:28) (62 - 95)  BP: 129/52 (14 May 2024 19:00) (84/36 - 177/43)  BP(mean): 73 (14 May 2024 19:00) (49 - 91)  RR: 20 (14 May 2024 19:00) (11 - 20)  SpO2: 100% (14 May 2024 19:28) (93% - 100%)    Parameters below as of 14 May 2024 19:00  Patient On (Oxygen Delivery Method): ventilator    O2 Concentration (%): 40      General Exam:   General Appearance: intubated, not sedasted    Head: Normocephalic, atraumatic and no dysmorphic features  Ear, Nose, and Throat: Moist mucous membranes  CVS: S1S2+  Resp: mechanical  Abd: soft NTND  Extremities: No edema, no cyanosis  Skin: No bruises, no rashes     Neurological Exam:  Mental Status: intubated, not sedated. Opens eyes to voice, does not track or FC  Cranial Nerves: pupils 1-2mm, no facial asymmetry  Motor: dec tone througout, no spontaneous movement  Sensation: no Wd to noxious       I personally reviewed the below data/images/labs:    LABS:                          8.4    13.53 )-----------( 360      ( 14 May 2024 00:13 )             25.0     05-14    136  |  94<L>  |  79<H>  ----------------------------<  228<H>  3.8   |  19<L>  |  5.83<H>    Ca    8.4      14 May 2024 00:13  Phos  6.6     05-14  Mg     2.40     05-14    TPro  5.8<L>  /  Alb  2.7<L>  /  TBili  0.3  /  DBili  x   /  AST  14  /  ALT  9   /  AlkPhos  114  05-14    LIVER FUNCTIONS - ( 14 May 2024 00:13 )  Alb: 2.7 g/dL / Pro: 5.8 g/dL / ALK PHOS: 114 U/L / ALT: 9 U/L / AST: 14 U/L / GGT: x           PT/INR - ( 14 May 2024 00:13 )   PT: 12.7 sec;   INR: 1.14 ratio         PTT - ( 14 May 2024 00:13 )  PTT:45.2 sec  Urinalysis Basic - ( 14 May 2024 00:13 )    Color: x / Appearance: x / SG: x / pH: x  Gluc: 228 mg/dL / Ketone: x  / Bili: x / Urobili: x   Blood: x / Protein: x / Nitrite: x   Leuk Esterase: x / RBC: x / WBC x   Sq Epi: x / Non Sq Epi: x / Bacteria: x              CT Head No Cont:  (01 May 2024 18:11)  < from: CT Head No Cont (05.01.24 @ 18:11) >    ACC: [id number] EXAM:  CT BRAIN   ORDERED BY: SHELBY MOREL     PROCEDURE DATE:  05/01/2024          INTERPRETATION:  CT OF THE HEAD WITHOUT CONTRAST    CLINICAL INDICATION: Lethargy.    TECHNIQUE: Volumetric CT acquisition was performed through the brain and   reviewed using brain and bone window technique.      COMPARISON: CT head from 1/17/2024    FINDINGS:    The ventricular and sulcal size and configuration is age appropriate.     There is no acute loss of gray-white differentiation. Stable bilateral   inferior frontal encephalomalacia and gliosis likely related to remote   trauma.    There is no evidence of mass effect, midline shift, acute intracranial   hemorrhage, or extra-axial collections.     The calvarium is intact. The paranasalsinuses are clear.The mastoid air   cells are predominantly clear. The orbits are unremarkable.      IMPRESSION:  No acute intracranial hemorrhage or acute territorial infarction. Chronic   findings as above.    --- End of Report ---          GILDARDO KHAN; Resident Radiologist  This document has been electronically signed.  LADARIUS DENNY MD; Attending Radiologist  This document has been electronically signed. May  1 2024  7:54PM    < end of copied text >      EEG Classification / Summary:  Abnormal EEG in the awake, drowsy states.   -Generalized sharp waves with triphasic morphology, initially appearing as frequent GPDs at 1-1.5 Hz, decreasing in prevalence later in recording.  -Variable moderate to mild diffuse slowing.  -No electrographic seizures are captured.     Clinical Impression:  -Generalized sharp waves with triphasic morphology can be seen in toxic-metabolic encephalopathies and indicate some risk of generalized seizures, especially when at higher frequencies than in this study. These decrease in prevalence over the course of recording.  -Variable moderate to mild diffuse cerebral dysfunction is nonspecific in etiology.   -No seizures x2 days of recording.    m< from: MR Head w/wo IV Cont (05.06.24 @ 18:10) >    ACC: [id number] EXAM:  MR BRAIN WAW IC   ORDERED BY: DOLORES QUICK     PROCEDURE DATE:  05/06/2024          INTERPRETATION:  CLINICAL INDICATION: Altered mental status.    TECHNIQUE: Multi-planar, multi-sequence magnetic resonance imaging of the   brain was performed with and without intravenous contrast.    CONTRAST: Post-contrast MR images were obtained following infusion of 5   mL of Gadavist    COMPARISON: No prior studies are available for comparison    FINDINGS:    VENTRICLES AND SULCI:  Normal.  INTRA-AXIAL: Punctate foci of restricted diffusion in the left talya and   left cerebellum with associated T2 prolongation consistent with acute   infarcts. No acute intracranial hemorrhage. Encephalomalacia/gliosis   noted in the inferior frontal lobes. No abnormal enhancement. There are   patchy and confluent foci of hyperintense T2 signal within the   subcortical and periventricular white matter which are nonspecific but   likely related to chronic microvascular ischemic disease.  EXTRA-AXIAL:  No mass or collection.  VISUALIZED SINUSES: Mild polypoid mucosal thickening. Fluid noted in the   nasopharynx.  VISUALIZED MASTOIDS:  Clear.  CALVARIUM:  Normal.  CAROTID FLOW VOIDS: Normal.  MISCELLANEOUS:  None.    IMPRESSION: PUNCTATE FOCI OF RESTRICTED DIFFUSION IN THE LEFT TALYA AND   LEFT CEREBELLUM WITH ASSOCIATED T2 PROLONGATION CONSISTENT WITH ACUTE   INFARCTS. NO ACUTE INTRACRANIAL HEMORRHAGE. NO AREA OF ABNORMAL   ENHANCEMENT.    < end of copied text >    m< from: MR Angio Head No Cont (05.09.24 @ 15:58) >    ACC: [id number] EXAM:  MR ANGIO NECK WAW IC   ORDERED BY: OMAR NESBITT     ACC: [id number] EXAM:  MR ANGIO BRAIN   ORDERED BY: OMAR NESBITT     PROCEDURE DATE:  05/09/2024          INTERPRETATION:  .    CLINICAL INFORMATION: Stroke workup. Talya/cerebellar stroke.    TECHNIQUE: MRA images through the neck and [place] of [place] were obtained   using a combination of 2-D and 3-D time-of-flight acquisition. Post   contrast MR angiography of the neck was also performed. The data was then   reformatted intoa volumetric data set and reviewed as rotational MIP   images. 5 cc's of IV Gadavist was administered without immediate   complication. 2.5 cc's was discarded.    COMPARISON: Prior head CT exam dated 5/1/2024.    FINDINGS:    MRA Neck: There is a standard anatomic configuration to the aortic arch.    The origins of the great vessels appear unremarkable. The bilateral   common carotid arteries and carotid bifurcations appear unremarkable.    The bilateral cervical internal carotid arteries are within normal limits.    The origins of the bilateral vertebral arteries are normal. The bilateral   cervical vertebral arteries are normal in course and caliber.    MRA [place] of [place]: The bilateral intracranial internal carotid,   anterior, and middle cerebral arteries appear unremarkable.    The anterior and bilateral posterior communicating arteries are notable.    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 Otherwise, the   bilateral intradural vertebral arteries, vertebrobasilar junction,   basilar artery, and basilar tip appear unremarkable as well as the   bilateral posterior cerebral arteries.    IMPRESSION: No large vessel occlusion or stenosis.    < end of copied text >     Neurology Progress Note    S: Patient seen and examined. Remains sedated      Medications: MEDICATIONS  (STANDING):  alteplase for catheter clearance 2 milliGRAM(s) Catheter once  aspirin  chewable 81 milliGRAM(s) Oral daily  chlorhexidine 0.12% Liquid 15 milliLiter(s) Oral Mucosa every 12 hours  chlorhexidine 2% Cloths 1 Application(s) Topical <User Schedule>  chlorhexidine 4% Liquid 1 Application(s) Topical <User Schedule>  dextrose 10% Bolus 125 milliLiter(s) IV Bolus once  dextrose 5%. 1000 milliLiter(s) (50 mL/Hr) IV Continuous <Continuous>  dextrose 5%. 1000 milliLiter(s) (100 mL/Hr) IV Continuous <Continuous>  dextrose 50% Injectable 12.5 Gram(s) IV Push once  dextrose 50% Injectable 25 Gram(s) IV Push once  epoetin reilly (EPOGEN) Injectable 03783 Unit(s) IV Push <User Schedule>  glucagon  Injectable 1 milliGRAM(s) IntraMuscular once  insulin lispro (ADMELOG) corrective regimen sliding scale   SubCutaneous every 6 hours  meropenem  IVPB 500 milliGRAM(s) IV Intermittent every 24 hours  norepinephrine Infusion 0.05 MICROgram(s)/kG/Min (4.93 mL/Hr) IV Continuous <Continuous>  petrolatum Ophthalmic Ointment 1 Application(s) Both EYES two times a day  propofol Infusion 19.987 MICROgram(s)/kG/Min (6.31 mL/Hr) IV Continuous <Continuous>  sevelamer carbonate Powder 800 milliGRAM(s) Oral every 8 hours  sodium bicarbonate 650 milliGRAM(s) Oral every 8 hours  sodium chloride 3%  Inhalation 4 milliLiter(s) Inhalation every 12 hours    MEDICATIONS  (PRN):  dextrose Oral Gel 15 Gram(s) Oral once PRN Blood Glucose LESS THAN 70 milliGRAM(s)/deciliter  sodium chloride 0.9% lock flush 10 milliLiter(s) IV Push every 1 hour PRN Pre/post blood products, medications, blood draw, and to maintain line patency       Vitals:  Vital Signs Last 24 Hrs  T(C): 37.3 (14 May 2024 16:00), Max: 37.4 (14 May 2024 00:00)  T(F): 99.2 (14 May 2024 16:00), Max: 99.4 (14 May 2024 00:00)  HR: 69 (14 May 2024 19:28) (62 - 95)  BP: 129/52 (14 May 2024 19:00) (84/36 - 177/43)  BP(mean): 73 (14 May 2024 19:00) (49 - 91)  RR: 20 (14 May 2024 19:00) (11 - 20)  SpO2: 100% (14 May 2024 19:28) (93% - 100%)    Parameters below as of 14 May 2024 19:00  Patient On (Oxygen Delivery Method): ventilator    O2 Concentration (%): 40      General Exam:   General Appearance: intubated, sedated  Head: Normocephalic, atraumatic and no dysmorphic features  Ear, Nose, and Throat: Moist mucous membranes  CVS: S1S2+  Resp: mechanical  Abd: soft NTND  Extremities: No edema, no cyanosis  Skin: No bruises, no rashes     Neurological Exam:  Mental Status: intubated, sedated sedated. Does not open eyes or follow commands. No BTT bialterally   Cranial Nerves: pupils 1-2mm, no facial asymmetry  Motor: dec tone througout, no spontaneous movement  Sensation: no Wd to noxious         I personally reviewed the below data/images/labs:    LABS:                          8.4    13.53 )-----------( 360      ( 14 May 2024 00:13 )             25.0     05-14    136  |  94<L>  |  79<H>  ----------------------------<  228<H>  3.8   |  19<L>  |  5.83<H>    Ca    8.4      14 May 2024 00:13  Phos  6.6     05-14  Mg     2.40     05-14    TPro  5.8<L>  /  Alb  2.7<L>  /  TBili  0.3  /  DBili  x   /  AST  14  /  ALT  9   /  AlkPhos  114  05-14    LIVER FUNCTIONS - ( 14 May 2024 00:13 )  Alb: 2.7 g/dL / Pro: 5.8 g/dL / ALK PHOS: 114 U/L / ALT: 9 U/L / AST: 14 U/L / GGT: x           PT/INR - ( 14 May 2024 00:13 )   PT: 12.7 sec;   INR: 1.14 ratio         PTT - ( 14 May 2024 00:13 )  PTT:45.2 sec  Urinalysis Basic - ( 14 May 2024 00:13 )    Color: x / Appearance: x / SG: x / pH: x  Gluc: 228 mg/dL / Ketone: x  / Bili: x / Urobili: x   Blood: x / Protein: x / Nitrite: x   Leuk Esterase: x / RBC: x / WBC x   Sq Epi: x / Non Sq Epi: x / Bacteria: x              CT Head No Cont:  (01 May 2024 18:11)  < from: CT Head No Cont (05.01.24 @ 18:11) >    ACC: [id number] EXAM:  CT BRAIN   ORDERED BY: SHELBY MOREL     PROCEDURE DATE:  05/01/2024          INTERPRETATION:  CT OF THE HEAD WITHOUT CONTRAST    CLINICAL INDICATION: Lethargy.    TECHNIQUE: Volumetric CT acquisition was performed through the brain and   reviewed using brain and bone window technique.      COMPARISON: CT head from 1/17/2024    FINDINGS:    The ventricular and sulcal size and configuration is age appropriate.     There is no acute loss of gray-white differentiation. Stable bilateral   inferior frontal encephalomalacia and gliosis likely related to remote   trauma.    There is no evidence of mass effect, midline shift, acute intracranial   hemorrhage, or extra-axial collections.     The calvarium is intact. The paranasalsinuses are clear.The mastoid air   cells are predominantly clear. The orbits are unremarkable.      IMPRESSION:  No acute intracranial hemorrhage or acute territorial infarction. Chronic   findings as above.    --- End of Report ---          GILDARDO KHAN; Resident Radiologist  This document has been electronically signed.  LADARIUS DENNY MD; Attending Radiologist  This document has been electronically signed. May  1 2024  7:54PM    < end of copied text >      EEG Classification / Summary:  Abnormal EEG in the awake, drowsy states.   -Generalized sharp waves with triphasic morphology, initially appearing as frequent GPDs at 1-1.5 Hz, decreasing in prevalence later in recording.  -Variable moderate to mild diffuse slowing.  -No electrographic seizures are captured.     Clinical Impression:  -Generalized sharp waves with triphasic morphology can be seen in toxic-metabolic encephalopathies and indicate some risk of generalized seizures, especially when at higher frequencies than in this study. These decrease in prevalence over the course of recording.  -Variable moderate to mild diffuse cerebral dysfunction is nonspecific in etiology.   -No seizures x2 days of recording.    m< from: MR Head w/wo IV Cont (05.06.24 @ 18:10) >    ACC: [id number] EXAM:  MR BRAIN WAW IC   ORDERED BY: DOLORES QUICK     PROCEDURE DATE:  05/06/2024          INTERPRETATION:  CLINICAL INDICATION: Altered mental status.    TECHNIQUE: Multi-planar, multi-sequence magnetic resonance imaging of the   brain was performed with and without intravenous contrast.    CONTRAST: Post-contrast MR images were obtained following infusion of 5   mL of Gadavist    COMPARISON: No prior studies are available for comparison    FINDINGS:    VENTRICLES AND SULCI:  Normal.  INTRA-AXIAL: Punctate foci of restricted diffusion in the left talya and   left cerebellum with associated T2 prolongation consistent with acute   infarcts. No acute intracranial hemorrhage. Encephalomalacia/gliosis   noted in the inferior frontal lobes. No abnormal enhancement. There are   patchy and confluent foci of hyperintense T2 signal within the   subcortical and periventricular white matter which are nonspecific but   likely related to chronic microvascular ischemic disease.  EXTRA-AXIAL:  No mass or collection.  VISUALIZED SINUSES: Mild polypoid mucosal thickening. Fluid noted in the   nasopharynx.  VISUALIZED MASTOIDS:  Clear.  CALVARIUM:  Normal.  CAROTID FLOW VOIDS: Normal.  MISCELLANEOUS:  None.    IMPRESSION: PUNCTATE FOCI OF RESTRICTED DIFFUSION IN THE LEFT TALYA AND   LEFT CEREBELLUM WITH ASSOCIATED T2 PROLONGATION CONSISTENT WITH ACUTE   INFARCTS. NO ACUTE INTRACRANIAL HEMORRHAGE. NO AREA OF ABNORMAL   ENHANCEMENT.    < end of copied text >    m< from: MR Angio Head No Cont (05.09.24 @ 15:58) >    ACC: [id number] EXAM:  MR ANGIO NECK WAW IC   ORDERED BY: OMAR NESBITT     ACC: [id number] EXAM:  MR ANGIO BRAIN   ORDERED BY: OMAR NESBITT     PROCEDURE DATE:  05/09/2024          INTERPRETATION:  .    CLINICAL INFORMATION: Stroke workup. Talya/cerebellar stroke.    TECHNIQUE: MRA images through the neck and [place] of [place] were obtained   using a combination of 2-D and 3-D time-of-flight acquisition. Post   contrast MR angiography of the neck was also performed. The data was then   reformatted intoa volumetric data set and reviewed as rotational MIP   images. 5 cc's of IV Gadavist was administered without immediate   complication. 2.5 cc's was discarded.    COMPARISON: Prior head CT exam dated 5/1/2024.    FINDINGS:    MRA Neck: There is a standard anatomic configuration to the aortic arch.    The origins of the great vessels appear unremarkable. The bilateral   common carotid arteries and carotid bifurcations appear unremarkable.    The bilateral cervical internal carotid arteries are within normal limits.    The origins of the bilateral vertebral arteries are normal. The bilateral   cervical vertebral arteries are normal in course and caliber.    MRA [place] of [place]: The bilateral intracranial internal carotid,   anterior, and middle cerebral arteries appear unremarkable.    The anterior and bilateral posterior communicating arteries are notable.    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 Otherwise, the   bilateral intradural vertebral arteries, vertebrobasilar junction,   basilar artery, and basilar tip appear unremarkable as well as the   bilateral posterior cerebral arteries.    IMPRESSION: No large vessel occlusion or stenosis.    < end of copied text >

## 2024-05-14 NOTE — CONSULT NOTE ADULT - ASSESSMENT
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 After addressing the goals of care, given the family’s decision to continue mechanical ventilation, patient will require tracheostomy tube placement. Family showed good understanding of the indications, contraindications, risks and benefits of percutaneous tracheostomy. Based on ventilatory requirements, neck anatomy and comorbidities, a shared decision was taken to proceed with bedside placement    We will perform the following:  Check CBC, BMP and coagulation profile ( Pt, PTT, INR) prior to the procedure.  Hold all anticoagulants/antiplatelet agents prior to the procedure.  Arrange flexible bronchoscopy at the time of the percutaneous tracheostomy for endoscopic guidance.        Post-tracheostomy care instructions:  -Minimize tension on tracheostomy: Keep tracheostomy elevated on towels to maintain perpendicular to neck and keep enough slack on vent tubing to avoid tension  -Sedate/Restrain patient to ensure does not pull at tracheostomy  -Keep tracheostomy retention collar in place at all times  -Utilize tracheostomy specific in-line suction or red rubber suction tubing through the swivel valve  -First dressing change at 72 hours. If dressings are grossly saturated before that time, reenforce dressings and page pulmonary/critical care fellow  -At 4 days the 2 o'clock and 8 o'clock sutures may be removed. The other 2 sutures are to remain in place for 10 days, please do not remove before then. Sutures may be removed by the primary service. If feel there are issues with the sutures, please contact MICU/Pulmonary fellow  -First tracheostomy change to happen in 3 weeks. If patient still admitted at that time please page pulmonary service to arrange exchange      -If issues with bleeding call pulmonary/critical care fellow on call  -If tracheostomy becomes dislodged prior to initial tracheostomy exchange do NOT attempt to replace the tracheostomy. Call MICU or Anesthesia immediately to intubate the patient orally with an ETT and page pulmonary/critical care fellow on call    Harpreet Kraft MD.   Interventional Pulmonology.

## 2024-05-14 NOTE — PROCEDURE NOTE - NSBRONCHPROCDETAILS_GEN_A_CORE_FT
With the patient in a supine position, the patient had an indwelling 7.5 cm endotracheal tube (ETT). A swivel airway adaptor was connected to the ETT through which bronchoscopy was performed for guiding the tracheostomy procedure. The tip of the ETT was seen at  4 cm above the main gatito. The lower trachea, mainstem bronchi and distal lobar and segmental bronchi looked normal except extensive mucoid secretions in the RLL. We pulled back the ETT in the subglottis under direct bronchoscopic visualization. We secured the ETT and selected the site for the percutaneous tracheostomy in between the 1st and the 2nd tracheal rings. All instruments and accessories used for tracheostomy including the finder needle, the large needle, the guide wire, the short dilator, stiffening catheter, the cone dilator and eventually the tracheostomy tube were visualized entering the trachea in the correct location. The inner cannula was inserted, the cuff was inflated and ventilation was switched from the endotracheal tube to the newly placed tracheostomy tube. A swivel adaptor was placed over the tracheostomy tube, and bronchoscopy was performed to assure that the airways were clean and to document the position of the tip of the tube. There was no evidence of bleeding and the tip of the tube was at 4 cm away from the main gatito. We then removed the bronchoscope form the tracheostomy tube and reinserted it in the ETT and visualized the tracheostomy balloon. The cuff of the ETT was deflated and the ETT was removed. It was noted that the stoma was at 3 cm away from the vocal cords. The bronchoscope was then reinserted into the tracheostomy tube. Therapeutic aspiration of secretions was performed. There was no evidence of bleeding, pneumothorax or hemodynamic instability. With the patient in a supine position, the patient had an indwelling 7.5 cm endotracheal tube (ETT). A swivel airway adaptor was connected to the ETT through which bronchoscopy was performed for guiding the tracheostomy procedure. The tip of the ETT was seen at  4 cm above the main gatito. The lower trachea, mainstem bronchi and distal lobar and segmental bronchi looked normal except extensive mucoid secretions in the RLL. We pulled back the ETT in the subglottis under direct bronchoscopic visualization. We secured the ETT and selected the site for the percutaneous tracheostomy in between the 1st and the 2nd tracheal rings. All instruments and accessories used for tracheostomy including the finder needle, the large needle, the guide wire, the short dilator, stiffening catheter, the cone dilator and eventually the tracheostomy tube were visualized entering the trachea in the correct location. The inner cannula was inserted, the cuff was inflated and ventilation was switched from the endotracheal tube to the newly placed tracheostomy tube. A swivel adaptor was placed over the tracheostomy tube, and bronchoscopy was performed to assure that the airways were clean and to document the position of the tip of the tube. There was no evidence of bleeding and the tip of the tube was at 4 cm away from the main gatito. We then removed the bronchoscope form the tracheostomy tube and reinserted it in the ETT and visualized the tracheostomy balloon. The cuff of the ETT was deflated and the ETT was removed. It was noted that the stoma was at 3 cm away from the vocal cords. The bronchoscope was then reinserted into the tracheostomy tube. Therapeutic aspiration of secretions was performed of thick secretions in lower lobes bilaterally, mostly in RLL.  BAL done with 15 cc of NS. There was no evidence of bleeding, pneumothorax or hemodynamic instability.

## 2024-05-14 NOTE — PROCEDURE NOTE - NSBRONCHFINDINGS_GEN_A_CORE_FT
Extensive mucoid secretions in the RLL - therapeutically suctioned  No endobronchial lesions  No post procedural bleeding
Fever, unspecified fever cause

## 2024-05-14 NOTE — CONSULT NOTE ADULT - SUBJECTIVE AND OBJECTIVE BOX
Chief complaint: Inability to wean off mechanical ventilation    HPI: This  is a  73y y/o Male with a prolonged respiratory failure who was referred to our service to be evaluated for percutaneous tracheostomy. Patient has respiratory failure which required endotracheal intubation and mechanical ventilation. He has a history of ESRD on HD, DM here with hiccups c/b AMS, concern for baclofen toxicity with acute hypoxemic respiratory failure requiring intubation, now with concern fo GPDs/ seizures, extubated and then reintubated on 5/12 for acute hypoxemic respiratory failure  MRI with acute L nat and cerebellum ischemic infarct Repeated weaning trails have failed and after discussions of goals of care with patient’s family, a decision was taken to proceed with prolonged mechanical ventilation for which the patient will require a secure artificial airway. Currently patient is on pressure support/assist control pressure/volume control mechanical ventilation    PMH/PSH/Social/FMH: Other disorders of electrolyte and fluid balance, not elsewhere classified    Anemia, unspecified    Chronic kidney disease, stage 4 (severe)    Chronic kidney disease, stage 5    Chronic kidney disease, unspecified    Contact with and (suspected) exposure to covid-19    Dependence on renal dialysis    Diarrhea, unspecified    Disorder of mineral metabolism, unspecified    Effusion, right ankle    Encounter for other preprocedural examination    Encounter for therapeutic drug level monitoring    End stage renal disease    Essential (primary) hypertension    Family history of disorders of kidney and ureter    Hyperkalemia    Hyperlipidemia, unspecified    Hypertensive chronic kidney disease with stage 5 chronic kidney disease or end stage renal disease    Kidney transplant status    Other iron deficiency anemias    Other specified diabetes mellitus with diabetic chronic kidney disease    Persistent proteinuria, unspecified    Personal history of other diseases of the circulatory system    Personal history of other endocrine, nutritional and metabolic disease    Stenosis of other vascular prosth dev/grft, init    Surgical operation with anastomosis, bypass or graft as the cause of abnormal reaction of the patient, or of later complication, without mention of misadventure at the time of the procedure    Type 2 diabetes mellitus with diabetic chronic kidney disease    Type 2 diabetes mellitus with hyperglycemia    Vitamin D deficiency, unspecified    FHx: diabetes mellitus (Father, Aunt)    Handoff    MEWS Score    Diabetes    Stage 5 chronic kidney disease not on chronic dialysis    Benign essential HTN    HLD (hyperlipidemia)    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    ESRD on hemodialysis    Hiccups    Electrolyte abnormality    No significant past surgical history    AVF (arteriovenous fistula)    Arteriovenous fistula    HICCUPS    90+    [email or web address]        Medications:  alteplase for catheter clearance 2 milliGRAM(s) Catheter once  aspirin  chewable 81 milliGRAM(s) Oral daily  chlorhexidine 0.12% Liquid 15 milliLiter(s) Oral Mucosa every 12 hours  chlorhexidine 2% Cloths 1 Application(s) Topical <User Schedule>  chlorhexidine 4% Liquid 1 Application(s) Topical <User Schedule>  dextrose 10% Bolus 125 milliLiter(s) IV Bolus once  dextrose 5%. 1000 milliLiter(s) IV Continuous <Continuous>  dextrose 5%. 1000 milliLiter(s) IV Continuous <Continuous>  dextrose 50% Injectable 12.5 Gram(s) IV Push once  dextrose 50% Injectable 25 Gram(s) IV Push once  dextrose Oral Gel 15 Gram(s) Oral once PRN  epoetin reilly (EPOGEN) Injectable 34145 Unit(s) IV Push <User Schedule>  glucagon  Injectable 1 milliGRAM(s) IntraMuscular once  insulin lispro (ADMELOG) corrective regimen sliding scale   SubCutaneous every 6 hours  meropenem  IVPB 500 milliGRAM(s) IV Intermittent every 24 hours  norepinephrine Infusion 0.05 MICROgram(s)/kG/Min IV Continuous <Continuous>  petrolatum Ophthalmic Ointment 1 Application(s) Both EYES two times a day  propofol Infusion 19.987 MICROgram(s)/kG/Min IV Continuous <Continuous>  sevelamer carbonate Powder 800 milliGRAM(s) Oral every 8 hours  sodium bicarbonate 650 milliGRAM(s) Oral every 8 hours  sodium chloride 0.9% lock flush 10 milliLiter(s) IV Push every 1 hour PRN  sodium chloride 3%  Inhalation 4 milliLiter(s) Inhalation every 12 hours      Allergies: No Known Allergies        ROS:     Review of Systems    Examination:        Vitals: T(C): 37.3 (05-14-24 @ 16:00), Max: 37.4 (05-14-24 @ 00:00)  HR: 71 (05-14-24 @ 22:00) (62 - 95)  BP: 148/58 (05-14-24 @ 22:00) (94/57 - 177/43)  RR: 20 (05-14-24 @ 22:00) (11 - 20)  SpO2: 100% (05-14-24 @ 22:00) (93% - 100%)    Mode: AC/ CMV (Assist Control/ Continuous Mandatory Ventilation), RR (machine): 20, TV (machine): 500, FiO2: 100, PEEP: 8, ITime: 0.84, MAP: 12, PIP: 24    Physical Exam    GENERAL: NAD, cachetic  HEAD:  Atraumatic, Normocephalic  EYES: EOMI, PERRLA, conjunctiva and sclera clear  NECK: Supple, No JVD  CHEST/LUNG: Clear to auscultation bilaterally; No wheeze; on vent  HEART: Regular rate and rhythm; No murmurs, rubs, or gallops  ABDOMEN: Soft, Nontender, Nondistended; Bowel sounds present  EXTREMITIES:  2+ Peripheral Pulses, No clubbing, cyanosis, or edema  PSYCH: sedated  NEUROLOGY: Sedated  SKIN: No rashes or lesions    Imaging: Reviewed.     Laboratory values:                           8.4    13.53 )-----------( 360      ( 14 May 2024 00:13 )             25.0       05-14    136  |  94<L>  |  79<H>  ----------------------------<  228<H>  3.8   |  19<L>  |  5.83<H>    Ca    8.4      14 May 2024 00:13  Phos  6.6     05-14  Mg     2.40     05-14    TPro  5.8<L>  /  Alb  2.7<L>  /  TBili  0.3  /  DBili  x   /  AST  14  /  ALT  9   /  AlkPhos  114  05-14

## 2024-05-14 NOTE — PROGRESS NOTE ADULT - ASSESSMENT
73M w/ PMH of HTN, ESRD (MWF), IDDM, p/f chest pain c/b hiccups for the last 2 days undergoing ischemic evaluation per cardiology. Pt s/p RRT x 2 for AMS. MICU consulted and accepting for airway monitoring in setting of baclofen toxicity +/- stroke now pending trach.     NEUROLOGY     #Pontine and Cerebellar strokes  - MR from 5/6 showing R pontine and cerebellar infarcts  - unclear timeline but may be inciting event for resp failure  - neurology following  - previously treated with Plavix, heparin, ASA for NSTEMI  - MRA head and neck w/o large vessel occlusion    CARDIOVASCULAR   #Angina  - patient admitted with chest pain and noted to have peaked T waves on admission   - TTE showing EF 72%  - EKG 5/3 demonstrating ST deviation noted on EKG  - Per cardiology, no plan for cath at this time  - s/p DAPT load 5/4  - will c/w ASA 81 mg daily with heparin gtt x 48 hours (now complete)  - repeat TTE (5/4) demonstrating no significant interval changes  - asymptomatic troponin iso ESRD    #HTN  - holding home medication iso hypotension    RESPIRATORY   - intubated due to inability to protect airway  - extubated 5/9 c/b weak cough with desatting improved with deep nasal suction and chest PT   - hypophonic and requiring airway clearance   - hypoxic and poor cough; reintubated on 5/12 for airway protection and hypoxia.     GI/NUTRITION   #Gastroporesis   - hold reglan for now   - failed speech and swallow after extubation    /RENAL   #ESRD MWF  - R fem shiley removed 5/2  - placement of IJ shiley 5/3 then removed 5/10 iso positive BCx  - pending fistula study of RUE (failed previous HD session)  - tolerating HD  - post MRI JET HD completed (2 sessions)  - new HD IJ catheter placed 5/13 with HD scheduled 5/13      INFECTIOUS DISEASE   #leukocytosis  - noted on repeat labs  - 5 day course of zosyn (5/3 - 5/8)  - now with rising WBC iso fever and positive BCx (B. Cereus; 1/2 with subsequent BCx negative at 24 hours); also with RLQ abdominal pain and CT abd showing R common iliac thrombus.   - started on meropenem and vanc (will need to be dosed by level)  - f/u pre-HD vanc level  - appreciate ID reccs.     ENDOCRINE   #IDDM  - cw ISS    HEMATOLOGY/ONCOLOGY  #Common illiac vein thrombus  - heparin gtt    ETHICS  Full code   73M w/ PMH of HTN, ESRD (MWF), IDDM, p/f chest pain c/b hiccups for the last 2 days undergoing ischemic evaluation per cardiology. Pt s/p RRT x 2 for AMS. MICU consulted and accepting for airway monitoring in setting of baclofen toxicity +/- stroke now pending trach.     NEUROLOGY     #Pontine and Cerebellar strokes  - MR from 5/6 showing R pontine and cerebellar infarcts  - unclear timeline but may be inciting event for resp failure  - neurology following  - previously treated with Plavix, heparin, ASA for NSTEMI  - MRA head and neck w/o large vessel occlusion    CARDIOVASCULAR   #Angina  - patient admitted with chest pain and noted to have peaked T waves on admission   - TTE showing EF 72%  - EKG 5/3 demonstrating ST deviation noted on EKG  - Per cardiology, no plan for cath at this time  - s/p DAPT load 5/4  - will c/w ASA 81 mg daily with heparin gtt x 48 hours (now complete)  - repeat TTE (5/4) demonstrating no significant interval changes  - asymptomatic troponin iso ESRD    #HTN  - holding home medication iso hypotension    RESPIRATORY   - intubated due to inability to protect airway  - extubated 5/9 c/b weak cough with desatting improved with deep nasal suction and chest PT   - hypophonic and requiring airway clearance   - hypoxic and poor cough; reintubated on 5/12 for airway protection and hypoxia.  - trach 5/14     GI/NUTRITION   #Gastroporesis   - hold reglan for now   - failed speech and swallow after extubation    /RENAL   #ESRD MWF  - R fem shiley removed 5/2  - placement of IJ shiley 5/3 then removed 5/10 iso positive BCx  - pending fistula study of RUE (failed previous HD session)  - tolerating HD  - post MRI JET HD completed (2 sessions)  - new HD IJ catheter placed 5/13 with HD scheduled 5/13  - s/p cath keli 5/14 now easily flushing      INFECTIOUS DISEASE   #leukocytosis  - noted on repeat labs  - 5 day course of zosyn (5/3 - 5/8)  - now with rising WBC iso fever and positive BCx (B. Cereus; 1/2 with subsequent BCx negative at 24 hours); also with RLQ abdominal pain and CT abd showing R common iliac thrombus.   - started on meropenem and vanc (will need to be dosed by level)  - appreciate ID reccs    ENDOCRINE   #IDDM  - cw ISS    HEMATOLOGY/ONCOLOGY  #Common illiac vein thrombus  - holding heparin gtt for trach today    ETHICS  Full code

## 2024-05-14 NOTE — PROGRESS NOTE ADULT - ASSESSMENT
73-year-old male past medical history hypertension, ESRD on dialysis Monday Wednesday Friday, diabetes insulin-dependent presents with hiccups patient endorses persistent hiccups for the last 2 days.   found to have peaked T waves, a new finding. denied dizziness, N/V, denies CP, palps, abd pain  Upon admission seen by CArd, renal and GI  found to have a distended GB prob 2/2 gastroparesis, was started on Reglan  has received 4 doses of baclofen since 4/30 2/2 hiccups, last dose on 5/1 at 5 am  had received Haldol for agitation at 11 pm on 4/30, AMS observed in pm of 5/1  AMS ongoing, RRT x 2 called  now transferred to MICU for encephalopathy requiring  airway protection on 5/2,  intubated for airway protection, was on  propofol and pressors. now off   HD was not done timely until femoral shiley was placed 2/2 clotted RUE AVF. now removed and RIJ shiley placed on 5/3  has been nonverbal since pm 5/1.     MR brain 5/6 c/w L pontine and L cerebellar acute infarcts, no hemorrhage . as per neuro: embolic in nature.   encephalopathy probably 2/2 acute CVA, now follows commands appropriately off sedation  no SZ focus on EEG,   off CRRT,  HD resumed as per renal.   remains intubated,   off keppra  off AC, off pressors, off CRRT, on HD as per renal,   completed 5 days of empiric ZOSYN on 5/7  toxicology consulted upon dx of encephalopathy, not impressed AMS 2/2 Haldol nor baclofen . AMS was 2/2 acute CVA   afebrile, off pressors, shock resolved  remains intubated for airway protection,   leukocytosis resolved  EKG changes on 5/3 c/w NSTEMI loaded w DAPT , was  on Heparin drip x 48 hrs. rpt TTE is unchanged  ID, Neuro , renal, cardiology following.  esrd, had missed HD prior to AMS 2/2 clotted RUE AVF. fistulogram when medically stable. vascular following   HD via RIght IJ Shiley.   NGT in place in stomach.   LP done, no sign of meningitis.         NEUROLOGY     - CTH without acute findings   - LP done, no acute findings  - MR brain w acute infarcts: L talya and L cerebellum, nonhemorrhagic  - no Sz focus on EEG    CARDIOVASCULAR     - ptn never had CP. just peaked T waves. was awaiting ischemic study w nucl stress test  - TTE showing EF 72%, rpt unchanged  - EKG changes on 5/3, loaded w DAPT now on Heparin drip      GI  - on NGT feeds while sedated, intubated  - Gastroparesis       RENAL   - s/p CRRT in MICU, now off, HD as per renal  - via R IJ josephineley. R fem removed  - scheduled for fistula study of RUE  on 5/16 w Dr. Lockett      INFECTIOUS DISEASE     - 5/9: s/p extubation, post extubation required deep suction, then on HFNC, Tmax 101.4, getting HD, s/p a single 500 mg dose of Meropenem now on IV VAnco. ID notified. reintubated 2/2 resp failure and sepsis on 5/11  - 5/13: ptn is intubated, sedated, on pressors in MICU, trache being planned. on Romaine and Vanc. vanc level is high. kolby placed R IJ for HD to resume. B.cereus bacteremia.    5/14: remains sedated, intubated, on pressors s/p B.cereus sepsis, on Vanc, meropenem. awaiting trache          ENDOCRINE   - DM  - cw ISS    GOC:  Full code

## 2024-05-14 NOTE — PROGRESS NOTE ADULT - SUBJECTIVE AND OBJECTIVE BOX
Cardiovascular Disease Progress Note  DATE OF SERVICE: 24 @ 08:32    Overnight events: No acute events overnight.    The patient remains intubated and sedated.        Objective Findings:  T(C): 37.2 (24 @ 08:00), Max: 37.4 (24 @ 00:00)  HR: 70 (24 @ 08:00) (54 - 91)  BP: 139/63 (24 @ 08:00) (84/36 - 177/43)  RR: 20 (24 @ 08:00) (18 - 20)  SpO2: 100% (24 @ 08:00) (93% - 100%)  Wt(kg): --  Daily     Daily Weight in k.5 (14 May 2024 00:00)      Physical Exam:  Gen: NAD; Patient resting comfortably  HEENT: Normocephalic/ atraumatic  CV: RRR, normal S1 + S2, no m/r/g  Lungs:  Normal respiratory effort; mechanical ventilation via ETT  Abd: soft, non-tender; bowel sounds present  Ext: No edema; warm and well perfused    Telemetry: Sinus; no ectopy    Laboratory Data:                        8.4    13.53 )-----------( 360      ( 14 May 2024 00:13 )             25.0         136  |  94<L>  |  79<H>  ----------------------------<  228<H>  3.8   |  19<L>  |  5.83<H>    Ca    8.4      14 May 2024 00:13  Phos  6.6       Mg     2.40         TPro  5.8<L>  /  Alb  2.7<L>  /  TBili  0.3  /  DBili  x   /  AST  14  /  ALT  9   /  AlkPhos  114      PT/INR - ( 14 May 2024 00:13 )   PT: 12.7 sec;   INR: 1.14 ratio         PTT - ( 14 May 2024 00:13 )  PTT:45.2 sec          Inpatient Medications:  MEDICATIONS  (STANDING):  alteplase for catheter clearance 2 milliGRAM(s) Catheter once  aspirin  chewable 81 milliGRAM(s) Oral daily  chlorhexidine 0.12% Liquid 15 milliLiter(s) Oral Mucosa every 12 hours  chlorhexidine 2% Cloths 1 Application(s) Topical <User Schedule>  chlorhexidine 4% Liquid 1 Application(s) Topical <User Schedule>  dextrose 10% Bolus 125 milliLiter(s) IV Bolus once  dextrose 5%. 1000 milliLiter(s) (100 mL/Hr) IV Continuous <Continuous>  dextrose 5%. 1000 milliLiter(s) (50 mL/Hr) IV Continuous <Continuous>  dextrose 50% Injectable 12.5 Gram(s) IV Push once  dextrose 50% Injectable 25 Gram(s) IV Push once  epoetin reilly (EPOGEN) Injectable 51574 Unit(s) IV Push <User Schedule>  glucagon  Injectable 1 milliGRAM(s) IntraMuscular once  insulin lispro (ADMELOG) corrective regimen sliding scale   SubCutaneous every 6 hours  meropenem  IVPB 500 milliGRAM(s) IV Intermittent every 24 hours  norepinephrine Infusion 0.05 MICROgram(s)/kG/Min (4.93 mL/Hr) IV Continuous <Continuous>  petrolatum Ophthalmic Ointment 1 Application(s) Both EYES two times a day  propofol Infusion 19.987 MICROgram(s)/kG/Min (6.31 mL/Hr) IV Continuous <Continuous>  sevelamer carbonate 800 milliGRAM(s) Oral every 8 hours  sodium bicarbonate 650 milliGRAM(s) Oral every 8 hours  sodium chloride 3%  Inhalation 4 milliLiter(s) Inhalation every 12 hours      Assessment: 73 year old man with HTN, HLD, T2DM on insulin, and ESRD on HD presents with supply demand ischemia and angina.    Plan of Care:    #Type II myocardial infarction-  Secondary to distributive shock earlier on admission. .   The repeat echo shows no new wall motion abnormality.   I would not pursue cath at this time given recurrent aspiration and respiratory failure.   Medical management of NSTEMI.    ASA 81 mg daily         #Sinus bradycardia-  No evidence of AV block on admission EKG or telemetry.  No indication for pacing at this time.     #HTN-  Recent shock.  Now on minimal Levophed.     #ESRD-  RRT in the MICU.         Patient critically ill in the MICU.            Over 55 minutes of critical care time spent on total encounter; more than 50% of the visit was spent counseling and/or coordinating care by the attending physician.      Geovani Bravo MD [facility]  Cardiovascular Disease  [phone]

## 2024-05-14 NOTE — PROGRESS NOTE ADULT - SUBJECTIVE AND OBJECTIVE BOX
[facility], Division of Infectious Diseases  AUGUST Carbajal Y. Patel, S. Shah, G. Brian  [phone]  ([phone] - weekdays after 5pm and weekends)    Name: JIMMY BLAND  Age/Gender: 73y Male  MRN: [id number]    Interval History:  Notes reviewed.   Afebrile.   remains intubated    Allergies: No Known Allergies      Objective:  Vitals:   T(F): 98.9 (05-14-24 @ 08:00), Max: 99.4 (05-14-24 @ 00:00)  HR: 68 (05-14-24 @ 11:00) (57 - 91)  BP: 122/60 (05-14-24 @ 11:00) (84/36 - 177/43)  RR: 20 (05-14-24 @ 11:00) (20 - 20)  SpO2: 100% (05-14-24 @ 11:00) (93% - 100%)  Physical Examination:  General: sedated, intubated  HEENT: anicteric, ETT  Lungs: clear to auscultation anteriorly  Heart: S1, S2, normal rate  Abdomen: Soft. Nondistended.  Extremities: No LE edema.   Skin: Warm. Dry.     Laboratory Studies:  CBC:                       8.4    13.53 )-----------( 360      ( 14 May 2024 00:13 )             25.0     WBC Trend:  13.53 05-14-24 @ 00:13  21.36 05-13-24 @ 02:40  31.30 05-12-24 @ 20:28  28.22 05-12-24 @ 03:00  27.31 05-11-24 @ 00:20  14.29 05-10-24 @ 00:50  12.78 05-09-24 @ 23:57  9.43 05-09-24 @ 00:50  8.14 05-08-24 @ 01:33    CMP: 05-14    136  |  94<L>  |  79<H>  ----------------------------<  228<H>  3.8   |  19<L>  |  5.83<H>    Ca    8.4      14 May 2024 00:13  Phos  6.6     05-14  Mg     2.40     05-14    TPro  5.8<L>  /  Alb  2.7<L>  /  TBili  0.3  /  DBili  x   /  AST  14  /  ALT  9   /  AlkPhos  114  05-14      LIVER FUNCTIONS - ( 14 May 2024 00:13 )  Alb: 2.7 g/dL / Pro: 5.8 g/dL / ALK PHOS: 114 U/L / ALT: 9 U/L / AST: 14 U/L / GGT: x           Vancomycin Level, Random: 26.9 ug/mL (05-13-24 @ 13:50)  Vancomycin Level, Random: 39.0 ug/mL (05-12-24 @ 03:00)  Vancomycin Level, Trough: 14.9 ug/mL (05-10-24 @ 21:00)    Urinalysis Basic - ( 14 May 2024 00:13 )    Color: x / Appearance: x / SG: x / pH: x  Gluc: 228 mg/dL / Ketone: x  / Bili: x / Urobili: x   Blood: x / Protein: x / Nitrite: x   Leuk Esterase: x / RBC: x / WBC x   Sq Epi: x / Non Sq Epi: x / Bacteria: x      Microbiology: reviewed     Culture - Fungal, Bronchial (collected 05-12-24 @ 15:06)  Source: .Bronchial Bronchial Lavage  Preliminary Report (05-13-24 @ 08:55):    Testing in progress    Culture - Acid Fast - Bronchial w/Smear (collected 05-12-24 @ 15:06)  Source: .Bronchial Bronchial Lavage    Culture - Bronchial (collected 05-12-24 @ 15:06)  Source: .Bronchial Bronchial Lavage  Gram Stain (05-12-24 @ 22:40):    Moderate polymorphonuclear leukocytes per low power field    Rare Squamous epithelial cells per low power field    Rare Gram Positive Cocci in Clusters per oil power field  Final Report (05-14-24 @ 08:10):    Normal Respiratory Jocelyn present    Culture - Blood (collected 05-11-24 @ 14:00)  Source: .Blood Blood-Peripheral  Preliminary Report (05-13-24 @ 19:01):    No growth at 48 Hours    Culture - Blood (collected 05-11-24 @ 14:00)  Source: .Blood Blood-Peripheral  Preliminary Report (05-13-24 @ 19:01):    No growth at 48 Hours    Culture - Sputum (collected 05-10-24 @ 16:45)  Source: .Sputum Sputum  Gram Stain (05-10-24 @ 22:57):    Rare polymorphonuclear leukocytes per low power field    Rare Squamous epithelial cells per low power field    Rare Gram Negative Rods per oil power field  Final Report (05-12-24 @ 16:49):    Normal Respiratory Jocelyn present    Culture - Blood (collected 05-10-24 @ 04:00)  Source: .Blood Blood-Peripheral  Preliminary Report (05-14-24 @ 07:01):    No growth at 4 days    Culture - Blood (collected 05-10-24 @ 03:45)  Source: .Blood Blood-Peripheral  Gram Stain (05-10-24 @ 19:34):    Growth in anaerobic bottle: Gram Positive Rods  Final Report (05-11-24 @ 14:47):    Growth in anaerobic bottle: Bacillus cereus "Susceptibilities not    performed"    Direct identification is available within approximately 3-5    hours either by Blood Panel Multiplexed PCR or Direct    MALDI-TOF. Details: [email or web address]  Organism: Blood Culture PCR (05-11-24 @ 14:47)  Organism: Blood Culture PCR (05-11-24 @ 14:47)      Method Type: PCR      -  Blood PCR Panel: NEG    Culture - Sputum (collected 05-08-24 @ 10:05)  Source: ET Tube ET Tube  Gram Stain (05-08-24 @ 20:37):    Few polymorphonuclear leukocytes per low power field    No Squamous epithelial cells per low power field    No organisms seen per oil power field  Final Report (05-10-24 @ 11:45):    Normal Respiratory Jocelyn present    Culture - Sputum (collected 05-04-24 @ 04:10)  Source: ET Tube ET Tube  Gram Stain (05-04-24 @ 13:40):    Few polymorphonuclear leukocytes seen per low power field    No Squamous epithelial cells per low power field    Numerous Gram Positive Rods seen per oil power field    Rare Gram Positive Cocci in Clusters seen per oil power field  Final Report (05-05-24 @ 17:14):    Normal Respiratory Jocelyn present    Culture - CSF with Gram Stain (collected 05-02-24 @ 12:15)  Source: .CSF CSF lumbar  Gram Stain (05-02-24 @ 13:51):    polymorphonuclear leukocytes    No organisms seen    by cytocentrifuge  Final Report (05-07-24 @ 08:05):    No growth at 5 days    Culture - Fungal, CSF (collected 05-02-24 @ 12:15)  Source: .CSF CSF lumbar  Preliminary Report (05-11-24 @ 23:02):    No fungus isolated at 1 week.    Culture - Blood (collected 05-02-24 @ 03:45)  Source: .Blood Blood-Peripheral  Final Report (05-07-24 @ 10:01):    No growth at 5 days    Culture - Blood (collected 05-02-24 @ 03:15)  Source: .Blood Blood-Venous  Final Report (05-07-24 @ 10:01):    No growth at 5 days        Radiology: reviewed     Medications:  alteplase for catheter clearance 2 milliGRAM(s) Catheter once  aspirin  chewable 81 milliGRAM(s) Oral daily  chlorhexidine 0.12% Liquid 15 milliLiter(s) Oral Mucosa every 12 hours  chlorhexidine 2% Cloths 1 Application(s) Topical <User Schedule>  chlorhexidine 4% Liquid 1 Application(s) Topical <User Schedule>  desmopressin IVPB 15 MICROGram(s) IV Intermittent once  dextrose 10% Bolus 125 milliLiter(s) IV Bolus once  dextrose 5%. 1000 milliLiter(s) IV Continuous <Continuous>  dextrose 5%. 1000 milliLiter(s) IV Continuous <Continuous>  dextrose 50% Injectable 25 Gram(s) IV Push once  dextrose 50% Injectable 12.5 Gram(s) IV Push once  dextrose Oral Gel 15 Gram(s) Oral once PRN  epoetin reilly (EPOGEN) Injectable 70968 Unit(s) IV Push <User Schedule>  glucagon  Injectable 1 milliGRAM(s) IntraMuscular once  insulin lispro (ADMELOG) corrective regimen sliding scale   SubCutaneous every 6 hours  meropenem  IVPB 500 milliGRAM(s) IV Intermittent every 24 hours  norepinephrine Infusion 0.05 MICROgram(s)/kG/Min IV Continuous <Continuous>  petrolatum Ophthalmic Ointment 1 Application(s) Both EYES two times a day  propofol Infusion 19.987 MICROgram(s)/kG/Min IV Continuous <Continuous>  sevelamer carbonate Powder 800 milliGRAM(s) Oral every 8 hours  sodium bicarbonate 650 milliGRAM(s) Oral every 8 hours  sodium chloride 0.9% lock flush 10 milliLiter(s) IV Push every 1 hour PRN  sodium chloride 3%  Inhalation 4 milliLiter(s) Inhalation every 12 hours    Antimicrobials:  meropenem  IVPB 500 milliGRAM(s) IV Intermittent every 24 hours

## 2024-05-15 LAB
ALBUMIN SERPL ELPH-MCNC: 2.3 G/DL — LOW (ref 3.3–5)
ALP SERPL-CCNC: 103 U/L — SIGNIFICANT CHANGE UP (ref 40–120)
ALT FLD-CCNC: 8 U/L — SIGNIFICANT CHANGE UP (ref 4–41)
ANION GAP SERPL CALC-SCNC: 23 MMOL/L — HIGH (ref 7–14)
APTT BLD: 25.9 SEC — SIGNIFICANT CHANGE UP (ref 24.5–35.6)
AST SERPL-CCNC: 15 U/L — SIGNIFICANT CHANGE UP (ref 4–40)
BASE EXCESS BLDV CALC-SCNC: -0.8 MMOL/L — SIGNIFICANT CHANGE UP (ref -2–3)
BASOPHILS # BLD AUTO: 0.02 K/UL — SIGNIFICANT CHANGE UP (ref 0–0.2)
BASOPHILS NFR BLD AUTO: 0.2 % — SIGNIFICANT CHANGE UP (ref 0–2)
BILIRUB SERPL-MCNC: 0.2 MG/DL — SIGNIFICANT CHANGE UP (ref 0.2–1.2)
BLOOD GAS ARTERIAL COMPREHENSIVE RESULT: SIGNIFICANT CHANGE UP
BLOOD GAS ARTERIAL COMPREHENSIVE RESULT: SIGNIFICANT CHANGE UP
BLOOD GAS VENOUS COMPREHENSIVE RESULT: SIGNIFICANT CHANGE UP
BUN SERPL-MCNC: 93 MG/DL — HIGH (ref 7–23)
CA-I BLD-SCNC: 0.97 MMOL/L — LOW (ref 1.15–1.29)
CALCIUM SERPL-MCNC: 7.9 MG/DL — LOW (ref 8.4–10.5)
CHLORIDE BLDV-SCNC: 97 MMOL/L — SIGNIFICANT CHANGE UP (ref 96–108)
CHLORIDE SERPL-SCNC: 95 MMOL/L — LOW (ref 98–107)
CO2 BLDV-SCNC: 26.2 MMOL/L — HIGH (ref 22–26)
CO2 SERPL-SCNC: 19 MMOL/L — LOW (ref 22–31)
CREAT SERPL-MCNC: 6.91 MG/DL — HIGH (ref 0.5–1.3)
CULTURE RESULTS: SIGNIFICANT CHANGE UP
EGFR: 8 ML/MIN/1.73M2 — LOW
EOSINOPHIL # BLD AUTO: 0.16 K/UL — SIGNIFICANT CHANGE UP (ref 0–0.5)
EOSINOPHIL NFR BLD AUTO: 1.7 % — SIGNIFICANT CHANGE UP (ref 0–6)
FUNGITELL B-D-GLUCAN,  BRONCHIAL LAVAGE: SIGNIFICANT CHANGE UP
GAS PNL BLDV: 135 MMOL/L — LOW (ref 136–145)
GLUCOSE BLDC GLUCOMTR-MCNC: 159 MG/DL — HIGH (ref 70–99)
GLUCOSE BLDC GLUCOMTR-MCNC: 245 MG/DL — HIGH (ref 70–99)
GLUCOSE BLDC GLUCOMTR-MCNC: 280 MG/DL — HIGH (ref 70–99)
GLUCOSE BLDC GLUCOMTR-MCNC: 292 MG/DL — HIGH (ref 70–99)
GLUCOSE BLDV-MCNC: 179 MG/DL — HIGH (ref 70–99)
GLUCOSE SERPL-MCNC: 193 MG/DL — HIGH (ref 70–99)
HCO3 BLDV-SCNC: 25 MMOL/L — SIGNIFICANT CHANGE UP (ref 22–29)
HCT VFR BLD CALC: 20.5 % — CRITICAL LOW (ref 39–50)
HCT VFR BLDA CALC: 22 % — LOW (ref 39–51)
HGB BLD CALC-MCNC: 7.3 G/DL — LOW (ref 12.6–17.4)
HGB BLD-MCNC: 7.2 G/DL — LOW (ref 13–17)
IANC: 8.29 K/UL — HIGH (ref 1.8–7.4)
IMM GRANULOCYTES NFR BLD AUTO: 0.5 % — SIGNIFICANT CHANGE UP (ref 0–0.9)
INR BLD: 1.18 RATIO — SIGNIFICANT CHANGE UP (ref 0.85–1.18)
LACTATE BLDV-MCNC: 1.1 MMOL/L — SIGNIFICANT CHANGE UP (ref 0.5–2)
LYMPHOCYTES # BLD AUTO: 0.63 K/UL — LOW (ref 1–3.3)
LYMPHOCYTES # BLD AUTO: 6.5 % — LOW (ref 13–44)
MAGNESIUM SERPL-MCNC: 2.5 MG/DL — SIGNIFICANT CHANGE UP (ref 1.6–2.6)
MCHC RBC-ENTMCNC: 20.6 PG — LOW (ref 27–34)
MCHC RBC-ENTMCNC: 35.1 GM/DL — SIGNIFICANT CHANGE UP (ref 32–36)
MCV RBC AUTO: 58.6 FL — LOW (ref 80–100)
MONOCYTES # BLD AUTO: 0.52 K/UL — SIGNIFICANT CHANGE UP (ref 0–0.9)
MONOCYTES NFR BLD AUTO: 5.4 % — SIGNIFICANT CHANGE UP (ref 2–14)
NEUTROPHILS # BLD AUTO: 8.29 K/UL — HIGH (ref 1.8–7.4)
NEUTROPHILS NFR BLD AUTO: 85.7 % — HIGH (ref 43–77)
NRBC # BLD: 0 /100 WBCS — SIGNIFICANT CHANGE UP (ref 0–0)
NRBC # FLD: 0 K/UL — SIGNIFICANT CHANGE UP (ref 0–0)
PCO2 BLDV: 45 MMHG — SIGNIFICANT CHANGE UP (ref 42–55)
PH BLDV: 7.35 — SIGNIFICANT CHANGE UP (ref 7.32–7.43)
PHOSPHATE SERPL-MCNC: 6.3 MG/DL — HIGH (ref 2.5–4.5)
PLATELET # BLD AUTO: 305 K/UL — SIGNIFICANT CHANGE UP (ref 150–400)
PO2 BLDV: 56 MMHG — HIGH (ref 25–45)
POTASSIUM BLDV-SCNC: 4.1 MMOL/L — SIGNIFICANT CHANGE UP (ref 3.5–5.1)
POTASSIUM SERPL-MCNC: 3.9 MMOL/L — SIGNIFICANT CHANGE UP (ref 3.5–5.3)
POTASSIUM SERPL-SCNC: 3.9 MMOL/L — SIGNIFICANT CHANGE UP (ref 3.5–5.3)
PROT SERPL-MCNC: 5.7 G/DL — LOW (ref 6–8.3)
PROTHROM AB SERPL-ACNC: 13.3 SEC — HIGH (ref 9.5–13)
RBC # BLD: 3.5 M/UL — LOW (ref 4.2–5.8)
RBC # FLD: 18.2 % — HIGH (ref 10.3–14.5)
SAO2 % BLDV: 81.1 % — SIGNIFICANT CHANGE UP (ref 67–88)
SODIUM SERPL-SCNC: 137 MMOL/L — SIGNIFICANT CHANGE UP (ref 135–145)
SPECIMEN SOURCE: SIGNIFICANT CHANGE UP
VANCOMYCIN FLD-MCNC: 22.3 UG/ML — SIGNIFICANT CHANGE UP
WBC # BLD: 9.67 K/UL — SIGNIFICANT CHANGE UP (ref 3.8–10.5)
WBC # FLD AUTO: 9.67 K/UL — SIGNIFICANT CHANGE UP (ref 3.8–10.5)

## 2024-05-15 PROCEDURE — 99291 CRITICAL CARE FIRST HOUR: CPT | Mod: GC

## 2024-05-15 RX ORDER — ACETAMINOPHEN 500 MG
1000 TABLET ORAL ONCE
Refills: 0 | Status: COMPLETED | OUTPATIENT
Start: 2024-05-15 | End: 2024-05-15

## 2024-05-15 RX ORDER — ACETAMINOPHEN 500 MG
650 TABLET ORAL ONCE
Refills: 0 | Status: DISCONTINUED | OUTPATIENT
Start: 2024-05-15 | End: 2024-05-15

## 2024-05-15 RX ORDER — VANCOMYCIN HCL 1 G
500 VIAL (EA) INTRAVENOUS ONCE
Refills: 0 | Status: COMPLETED | OUTPATIENT
Start: 2024-05-15 | End: 2024-05-15

## 2024-05-15 RX ADMIN — Medication 2: at 11:29

## 2024-05-15 RX ADMIN — Medication 6: at 05:39

## 2024-05-15 RX ADMIN — Medication 400 MILLIGRAM(S): at 21:00

## 2024-05-15 RX ADMIN — Medication 100 MILLIGRAM(S): at 17:59

## 2024-05-15 RX ADMIN — ERYTHROPOIETIN 10000 UNIT(S): 10000 INJECTION, SOLUTION INTRAVENOUS; SUBCUTANEOUS at 11:57

## 2024-05-15 RX ADMIN — SEVELAMER CARBONATE 800 MILLIGRAM(S): 2400 POWDER, FOR SUSPENSION ORAL at 15:22

## 2024-05-15 RX ADMIN — Medication 81 MILLIGRAM(S): at 11:29

## 2024-05-15 RX ADMIN — SODIUM CHLORIDE 4 MILLILITER(S): 9 INJECTION INTRAMUSCULAR; INTRAVENOUS; SUBCUTANEOUS at 23:11

## 2024-05-15 RX ADMIN — Medication 4: at 17:58

## 2024-05-15 RX ADMIN — CHLORHEXIDINE GLUCONATE 15 MILLILITER(S): 213 SOLUTION TOPICAL at 05:39

## 2024-05-15 RX ADMIN — Medication 650 MILLIGRAM(S): at 21:18

## 2024-05-15 RX ADMIN — Medication 6: at 23:22

## 2024-05-15 RX ADMIN — CHLORHEXIDINE GLUCONATE 15 MILLILITER(S): 213 SOLUTION TOPICAL at 17:58

## 2024-05-15 RX ADMIN — CHLORHEXIDINE GLUCONATE 1 APPLICATION(S): 213 SOLUTION TOPICAL at 05:38

## 2024-05-15 RX ADMIN — Medication 1 APPLICATION(S): at 05:39

## 2024-05-15 RX ADMIN — SEVELAMER CARBONATE 800 MILLIGRAM(S): 2400 POWDER, FOR SUSPENSION ORAL at 05:38

## 2024-05-15 RX ADMIN — Medication 1 APPLICATION(S): at 17:58

## 2024-05-15 RX ADMIN — SODIUM CHLORIDE 4 MILLILITER(S): 9 INJECTION INTRAMUSCULAR; INTRAVENOUS; SUBCUTANEOUS at 07:27

## 2024-05-15 RX ADMIN — Medication 650 MILLIGRAM(S): at 05:38

## 2024-05-15 RX ADMIN — Medication 650 MILLIGRAM(S): at 15:22

## 2024-05-15 RX ADMIN — SEVELAMER CARBONATE 800 MILLIGRAM(S): 2400 POWDER, FOR SUSPENSION ORAL at 21:18

## 2024-05-15 NOTE — PROGRESS NOTE ADULT - ASSESSMENT
73M w/ PMH of HTN, ESRD (MWF), IDDM, p/f chest pain c/b hiccups for the last 2 days undergoing ischemic evaluation per cardiology. Pt s/p RRT x 2 for AMS. MICU consulted and accepting for airway monitoring in setting of baclofen toxicity +/- stroke now pending trach.     NEUROLOGY     #Pontine and Cerebellar strokes  - MR from 5/6 showing R pontine and cerebellar infarcts  - unclear timeline but may be inciting event for resp failure  - neurology following  - previously treated with Plavix, heparin, ASA for NSTEMI  - MRA head and neck w/o large vessel occlusion    CARDIOVASCULAR   #Angina  - patient admitted with chest pain and noted to have peaked T waves on admission   - TTE showing EF 72%  - EKG 5/3 demonstrating ST deviation noted on EKG  - Per cardiology, no plan for cath at this time  - s/p DAPT load 5/4  - will c/w ASA 81 mg daily with heparin gtt x 48 hours (now complete)  - repeat TTE (5/4) demonstrating no significant interval changes  - asymptomatic troponin iso ESRD    #HTN  - holding home medication iso hypotension    RESPIRATORY   - intubated due to inability to protect airway  - extubated 5/9 c/b weak cough with desatting improved with deep nasal suction and chest PT   - hypophonic and requiring airway clearance   - hypoxic and poor cough; reintubated on 5/12 for airway protection and hypoxia.  - trach 5/14     GI/NUTRITION   #Gastroporesis   - hold reglan for now   - failed speech and swallow after extubation    /RENAL   #ESRD MWF  - R fem shiley removed 5/2  - placement of IJ shiley 5/3 then removed 5/10 iso positive BCx  - pending fistula study of RUE (failed previous HD session)  - tolerating HD  - post MRI JET HD completed (2 sessions)  - new HD IJ catheter placed 5/13 with HD scheduled 5/13  - s/p cath keli 5/14 now easily flushing      INFECTIOUS DISEASE   #leukocytosis  - noted on repeat labs  - 5 day course of zosyn (5/3 - 5/8)  - now with rising WBC iso fever and positive BCx (B. Cereus; 1/2 with subsequent BCx negative at 24 hours); also with RLQ abdominal pain and CT abd showing R common iliac thrombus.   - started on meropenem and vanc (will need to be dosed by level)  - appreciate ID reccs    ENDOCRINE   #IDDM  - cw ISS    HEMATOLOGY/ONCOLOGY  #Common illiac vein thrombus  - holding heparin gtt for trach today    ETHICS  Full code

## 2024-05-15 NOTE — PROGRESS NOTE ADULT - SUBJECTIVE AND OBJECTIVE BOX
Cardiovascular Disease Progress Note  DATE OF SERVICE: 05-15-24 @ 07:51    Overnight events: No acute events overnight.    The patient is on mechanical ventilation via trach.        Objective Findings:  T(C): 37.8 (05-15-24 @ 04:00), Max: 37.8 (05-15-24 @ 04:00)  HR: 72 (05-15-24 @ 07:29) (62 - 95)  BP: 161/62 (05-15-24 @ 07:00) (94/57 - 161/62)  RR: 12 (05-15-24 @ 07:00) (11 - 20)  SpO2: 100% (05-15-24 @ 07:29) (96% - 100%)  Wt(kg): --  Daily     Daily Weight in k.5 (15 May 2024 04:00)      Physical Exam:  Gen: NAD; Patient resting comfortably  HEENT:  Normocephalic/ atraumatic  CV: RRR, normal S1 + S2, no m/r/g  Lungs:  Normal respiratory effort; clear to auscultation bilaterally  Abd: soft, non-tender; bowel sounds present  Ext: No edema; warm and well perfused    Telemetry: Sinus    Laboratory Data:                        7.2    9.67  )-----------( 305      ( 15 May 2024 01:15 )             20.5     -15    137  |  95<L>  |  93<H>  ----------------------------<  193<H>  3.9   |  19<L>  |  6.91<H>    Ca    7.9<L>      15 May 2024 01:15  Phos  6.3     -15  Mg     2.50     05-15    TPro  5.7<L>  /  Alb  2.3<L>  /  TBili  0.2  /  DBili  x   /  AST  15  /  ALT  8   /  AlkPhos  103  -15    PT/INR - ( 15 May 2024 01:15 )   PT: 13.3 sec;   INR: 1.18 ratio         PTT - ( 15 May 2024 01:15 )  PTT:25.9 sec          Inpatient Medications:  MEDICATIONS  (STANDING):  alteplase for catheter clearance 2 milliGRAM(s) Catheter once  aspirin  chewable 81 milliGRAM(s) Oral daily  chlorhexidine 0.12% Liquid 15 milliLiter(s) Oral Mucosa every 12 hours  chlorhexidine 2% Cloths 1 Application(s) Topical <User Schedule>  dextrose 10% Bolus 125 milliLiter(s) IV Bolus once  dextrose 5%. 1000 milliLiter(s) (50 mL/Hr) IV Continuous <Continuous>  dextrose 5%. 1000 milliLiter(s) (100 mL/Hr) IV Continuous <Continuous>  dextrose 50% Injectable 12.5 Gram(s) IV Push once  dextrose 50% Injectable 25 Gram(s) IV Push once  epoetin reilly (EPOGEN) Injectable 69765 Unit(s) IV Push <User Schedule>  glucagon  Injectable 1 milliGRAM(s) IntraMuscular once  insulin lispro (ADMELOG) corrective regimen sliding scale   SubCutaneous every 6 hours  meropenem  IVPB 500 milliGRAM(s) IV Intermittent every 24 hours  norepinephrine Infusion 0.05 MICROgram(s)/kG/Min (4.93 mL/Hr) IV Continuous <Continuous>  petrolatum Ophthalmic Ointment 1 Application(s) Both EYES two times a day  propofol Infusion 19.987 MICROgram(s)/kG/Min (6.31 mL/Hr) IV Continuous <Continuous>  sevelamer carbonate Powder 800 milliGRAM(s) Oral every 8 hours  sodium bicarbonate 650 milliGRAM(s) Oral every 8 hours  sodium chloride 3%  Inhalation 4 milliLiter(s) Inhalation every 12 hours      Assessment: 73 year old man with HTN, HLD, T2DM on insulin, and ESRD on HD presents with supply demand ischemia and angina.    Plan of Care:    #Type II myocardial infarction-  Secondary to distributive shock earlier on admission.   The repeat echo shows no new wall motion abnormality.   I would not pursue cath at this time given recurrent aspiration and chronic respiratory failure s/p trach .   Medical management of NSTEMI.    ASA 81 mg daily         #Sinus bradycardia-  No evidence of AV block on admission EKG or telemetry.  No indication for pacing at this time.     #HTN-  Recent shock.  Monitor BP trend off pressors.      #ESRD-  RRT in the MICU.         Patient critically ill in the MICU.             Over 51 minutes of critical care time spent on total encounter; more than 50% of the visit was spent counseling and/or coordinating care by the attending physician.      Geovani Bravo MD [facility]  Cardiovascular Disease  [phone]

## 2024-05-15 NOTE — PROGRESS NOTE ADULT - ASSESSMENT
73-year-old male past medical history hypertension, ESRD on dialysis Monday Wednesday Friday, diabetes insulin-dependent presents with hiccups patient endorses persistent hiccups for the last 2 days.   found to have peaked T waves, a new finding. denied dizziness, N/V, denies CP, palps, abd pain  Upon admission seen by CArd, renal and GI  found to have a distended GB prob 2/2 gastroparesis, was started on Reglan  has received 4 doses of baclofen since 4/30 2/2 hiccups, last dose on 5/1 at 5 am  had received Haldol for agitation at 11 pm on 4/30, AMS observed in pm of 5/1  AMS ongoing, RRT x 2 called  now transferred to MICU for encephalopathy requiring  airway protection on 5/2,  intubated for airway protection, was on  propofol and pressors. now off   HD was not done timely until femoral shiley was placed 2/2 clotted RUE AVF. now removed and RIJ shiley placed on 5/3  has been nonverbal since pm 5/1.     MR brain 5/6 c/w L pontine and L cerebellar acute infarcts, no hemorrhage . as per neuro: embolic in nature.   encephalopathy probably 2/2 acute CVA, now follows commands appropriately off sedation  no SZ focus on EEG,   off CRRT,  HD resumed as per renal.   remains intubated,   off keppra  off AC, off pressors, off CRRT, on HD as per renal,   completed 5 days of empiric ZOSYN on 5/7  toxicology consulted upon dx of encephalopathy, not impressed AMS 2/2 Haldol nor baclofen . AMS was 2/2 acute CVA    s/p trache placement by pulm, IR unable to find a window for G-J tube. HD via IJ. remains vented, sedated and on pressors. on Van for B .cereus sepsis/ bacteremia     leukocytosis resolved  EKG changes on 5/3 c/w NSTEMI loaded w DAPT , was  on Heparin drip x 48 hrs. rpt TTE is unchanged  ID, Neuro , renal, cardiology following.  esrd, had missed HD prior to AMS 2/2 clotted RUE AVF. fistulogram when medically stable. vascular following   HD via RIght IJ Shiley.   NGT in place in stomach.   LP done, no sign of meningitis.         NEUROLOGY     - CTH without acute findings   - LP done, no acute findings  - MR brain w acute infarcts: L talya and L cerebellum, nonhemorrhagic  - no Sz focus on EEG    CARDIOVASCULAR     - ptn never had CP. just peaked T waves. was awaiting ischemic study w nucl stress test  - TTE showing EF 72%, rpt unchanged  - EKG changes on 5/3, loaded w DAPT now on Heparin drip      GI  - on NGT feeds while sedated, intubated  - Gastroparesis       RENAL   - s/p CRRT in MICU, now off, HD as per renal  - via R IJ josephineley. R fem removed  - scheduled for fistula study of RUE  on 5/16 w Dr. Lockett      INFECTIOUS DISEASE     - 5/9: s/p extubation, post extubation required deep suction, then on HFNC, Tmax 101.4, getting HD, s/p a single 500 mg dose of Meropenem now on IV VAnco. ID notified. reintubated 2/2 resp failure and sepsis on 5/11  - 5/13: ptn is intubated, sedated, on pressors in MICU, trache being planned. on Romaine and Vanc. vanc level is high. kolby placed R IJ for HD to resume. B.cereus bacteremia.    5/14: remains sedated, intubated, on pressors s/p B.cereus sepsis, on Vanc, meropenem. awaiting trache          ENDOCRINE   - DM  - cw ISS    GOC:  Full code

## 2024-05-15 NOTE — PROGRESS NOTE ADULT - SUBJECTIVE AND OBJECTIVE BOX
CHIEF COMPLAINT: Patient is a 73y old  Male who presents with a chief complaint of Unstable angina, abn EKG (15 May 2024 17:27)      Interval Events:  tolerated trach placement, no complications    REVIEW OF SYSTEMS:  [x] All other systems negative except per HPI   [ ] Unable to assess ROS because ________    OBJECTIVE:  ICU Vital Signs Last 24 Hrs  T(C): 35.8 (15 May 2024 14:08), Max: 37.8 (15 May 2024 04:00)  T(F): 96.5 (15 May 2024 14:08), Max: 100.1 (15 May 2024 04:00)  HR: 77 (15 May 2024 18:00) (60 - 78)  BP: 154/60 (15 May 2024 18:00) (101/51 - 161/62)  BP(mean): 83 (15 May 2024 18:00) (66 - 92)  ABP: --  ABP(mean): --  RR: 12 (15 May 2024 18:00) (12 - 20)  SpO2: 100% (15 May 2024 18:00) (99% - 100%)    O2 Parameters below as of 15 May 2024 18:00  Patient On (Oxygen Delivery Method): ventilator          Mode: AC/ CMV (Assist Control/ Continuous Mandatory Ventilation), RR (machine): 12, TV (machine): 500, FiO2: 40, PEEP: 8, ITime: 0.8, MAP: 11, PIP: 24    05-14 @ 07:01  -  05-15 @ 07:00  --------------------------------------------------------  IN: 809.8 mL / OUT: 0 mL / NET: 809.8 mL    05-15 @ 07:01  -  05-15 @ 19:00  --------------------------------------------------------  IN: 611.2 mL / OUT: 1400 mL / NET: -788.8 mL        PHYSICAL EXAM:  GENERAL: NAD, well-groomed, well-developed  HEAD:  Atraumatic, Normocephalic  EYES: EOMI, PERRLA, conjunctiva and sclera clear  ENMT: No tonsillar erythema, exudates, or enlargement; Moist mucous membranes, Good dentition, No lesions  NECK: Supple, No JVD, Normal thyroid  CHEST/LUNG: Clear to auscultation bilaterally; No rales, rhonchi, wheezing, or rubs  HEART: Regular rate and rhythm; No murmurs, rubs, or gallops  ABDOMEN: Soft, Nontender, Nondistended; Bowel sounds present  VASCULAR:  2+ Peripheral Pulses, No clubbing, cyanosis, or edema  LYMPH: No lymphadenopathy noted  SKIN: No rashes or lesions  NERVOUS SYSTEM:  sedated,     HOSPITAL MEDICATIONS:  MEDICATIONS  (STANDING):  aspirin  chewable 81 milliGRAM(s) Oral daily  chlorhexidine 0.12% Liquid 15 milliLiter(s) Oral Mucosa every 12 hours  chlorhexidine 2% Cloths 1 Application(s) Topical <User Schedule>  dextrose 10% Bolus 125 milliLiter(s) IV Bolus once  dextrose 5%. 1000 milliLiter(s) (50 mL/Hr) IV Continuous <Continuous>  dextrose 5%. 1000 milliLiter(s) (100 mL/Hr) IV Continuous <Continuous>  dextrose 50% Injectable 12.5 Gram(s) IV Push once  dextrose 50% Injectable 25 Gram(s) IV Push once  epoetin reilly (EPOGEN) Injectable 17640 Unit(s) IV Push <User Schedule>  glucagon  Injectable 1 milliGRAM(s) IntraMuscular once  insulin lispro (ADMELOG) corrective regimen sliding scale   SubCutaneous every 6 hours  norepinephrine Infusion 0.05 MICROgram(s)/kG/Min (4.93 mL/Hr) IV Continuous <Continuous>  petrolatum Ophthalmic Ointment 1 Application(s) Both EYES two times a day  propofol Infusion 19.987 MICROgram(s)/kG/Min (6.31 mL/Hr) IV Continuous <Continuous>  sevelamer carbonate Powder 800 milliGRAM(s) Oral every 8 hours  sodium bicarbonate 650 milliGRAM(s) Oral every 8 hours  sodium chloride 3%  Inhalation 4 milliLiter(s) Inhalation every 12 hours    MEDICATIONS  (PRN):  dextrose Oral Gel 15 Gram(s) Oral once PRN Blood Glucose LESS THAN 70 milliGRAM(s)/deciliter  sodium chloride 0.9% lock flush 10 milliLiter(s) IV Push every 1 hour PRN Pre/post blood products, medications, blood draw, and to maintain line patency      LABS:    The Labs were reviewed by me   The Radiology was reviewed by me    EKG tracing reviewed by me    05-15    137  |  95<L>  |  93<H>  ----------------------------<  193<H>  3.9   |  19<L>  |  6.91<H>  05-14    136  |  94<L>  |  79<H>  ----------------------------<  228<H>  3.8   |  19<L>  |  5.83<H>  05-13    137  |  93<L>  |  102<H>  ----------------------------<  189<H>  4.5   |  14<L>  |  8.00<H>    Ca    7.9<L>      15 May 2024 01:15  Ca    8.4      14 May 2024 00:13  Ca    8.9      13 May 2024 02:40  Phos  6.3     05-15  Mg     2.50     05-15    TPro  5.7<L>  /  Alb  2.3<L>  /  TBili  0.2  /  DBili  x   /  AST  15  /  ALT  8   /  AlkPhos  103  05-15  TPro  5.8<L>  /  Alb  2.7<L>  /  TBili  0.3  /  DBili  x   /  AST  14  /  ALT  9   /  AlkPhos  114  05-14  TPro  5.9<L>  /  Alb  2.5<L>  /  TBili  0.3  /  DBili  x   /  AST  9   /  ALT  12  /  AlkPhos  110  05-13    Magnesium: 2.50 mg/dL (05-15-24 @ 01:15)  Magnesium: 2.40 mg/dL (05-14-24 @ 00:13)  Magnesium: 2.90 mg/dL (05-13-24 @ 02:40)  Magnesium: 2.90 mg/dL (05-12-24 @ 20:28)    Phosphorus: 6.3 mg/dL (05-15-24 @ 01:15)  Phosphorus: 6.6 mg/dL (05-14-24 @ 00:13)  Phosphorus: 8.1 mg/dL (05-13-24 @ 02:40)  Phosphorus: 9.0 mg/dL (05-12-24 @ 20:28)      PT/INR - ( 15 May 2024 01:15 )   PT: 13.3 sec;   INR: 1.18 ratio         PTT - ( 15 May 2024 01:15 )  PTT:25.9 sec              Urinalysis Basic - ( 15 May 2024 01:15 )    Color: x / Appearance: x / SG: x / pH: x  Gluc: 193 mg/dL / Ketone: x  / Bili: x / Urobili: x   Blood: x / Protein: x / Nitrite: x   Leuk Esterase: x / RBC: x / WBC x   Sq Epi: x / Non Sq Epi: x / Bacteria: x      ABG - ( 15 May 2024 03:30 )  pH, Arterial: 7.51  pH, Blood: x     /  pCO2: 30    /  pO2: 142   / HCO3: 24    / Base Excess: 0.9   /  SaO2: 99.4                                    7.2    9.67  )-----------( 305      ( 15 May 2024 01:15 )             20.5                         8.4    13.53 )-----------( 360      ( 14 May 2024 00:13 )             25.0                         8.6    21.36 )-----------( 385      ( 13 May 2024 02:40 )             24.8     CAPILLARY BLOOD GLUCOSE      POCT Blood Glucose.: 245 mg/dL (15 May 2024 17:56)  POCT Blood Glucose.: 159 mg/dL (15 May 2024 11:18)  POCT Blood Glucose.: 292 mg/dL (15 May 2024 05:37)  POCT Blood Glucose.: 162 mg/dL (14 May 2024 23:34)        MICROBIOLOGY:     RADIOLOGY:  [ ] Reviewed and interpreted by me    Point of Care Ultrasound Findings:    PFT:    EKG:

## 2024-05-15 NOTE — PROGRESS NOTE ADULT - ASSESSMENT
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 After addressing the goals of care, given the family’s decision to continue mechanical ventilation, patient will require tracheostomy tube placement. Family showed good understanding of the indications, contraindications, risks and benefits of percutaneous tracheostomy. Based on ventilatory requirements, neck anatomy and comorbidities, a shared decision was taken to proceed with bedside placement        Post-tracheostomy care instructions:  -Minimize tension on tracheostomy: Keep tracheostomy elevated on towels to maintain perpendicular to neck and keep enough slack on vent tubing to avoid tension  -Sedate/Restrain patient to ensure does not pull at tracheostomy  -Keep tracheostomy retention collar in place at all times  -Utilize tracheostomy specific in-line suction or red rubber suction tubing through the swivel valve  -First dressing change at 72 hours. If dressings are grossly saturated before that time, reenforce dressings and page pulmonary/critical care fellow  - 2 sutures are to remain in place for 10 days, please do not remove before then. Sutures may be removed by the primary service. If feel there are issues with the sutures, please contact MICU/Pulmonary fellow  -First tracheostomy change to happen in 3 weeks. If patient still admitted at that time please page pulmonary service to arrange exchange      -If issues with bleeding call pulmonary/critical care fellow on call  -If tracheostomy becomes dislodged prior to initial tracheostomy exchange do NOT attempt to replace the tracheostomy. Call MICU or Anesthesia immediately to intubate the patient orally with an ETT and page pulmonary/critical care fellow on call   73 M with ESRD on HD, DM here with hiccups c/b AMS, concern for baclofen toxicity with acute hypoxemic respiratory failure requiring intubation, now with concern fo GPDs/ seizures, extubated and then reintubated on 5/12 for acute hypoxemic respiratory failure   MRI with acute L nat and cerebellum ischemic infarct. After addressing the goals of care, given the family’s decision to continue mechanical ventilation, patient will require tracheostomy tube placement. Family showed good understanding of the indications, contraindications, risks and benefits of percutaneous tracheostomy. Based on ventilatory requirements, neck anatomy and comorbidities, a shared decision was taken to proceed with bedside placement    POD$#1 Percutaneous tracheostomy Cuffed Portex 8.       Post-tracheostomy care instructions:  -Minimize tension on tracheostomy: Keep tracheostomy elevated on towels to maintain perpendicular to neck and keep enough slack on vent tubing to avoid tension  -Sedate/Restrain patient to ensure does not pull at tracheostomy  -Keep tracheostomy retention collar in place at all times  -Utilize tracheostomy specific in-line suction or red rubber suction tubing through the swivel valve  -First dressing change at 72 hours. If dressings are grossly saturated before that time, reenforce dressings and page pulmonary/critical care fellow  - 2 sutures are to remain in place for 10 days, please do not remove before then. Sutures may be removed by the primary service. If feel there are issues with the sutures, please contact MICU/Pulmonary fellow  -First tracheostomy change to happen in 3 weeks. If patient still admitted at that time please page pulmonary service to arrange exchange      -If issues with bleeding call pulmonary/critical care fellow on call  -If tracheostomy becomes dislodged prior to initial tracheostomy exchange do NOT attempt to replace the tracheostomy. Call MICU or Anesthesia immediately to intubate the patient orally with an ETT and page pulmonary/critical care fellow on call

## 2024-05-15 NOTE — CONSULT NOTE ADULT - ASSESSMENT
Interventional Radiology    Evaluate for Procedure:     HPI: 73M w/ PMH of HTN, ESRD (MWF), IDDM, p/f chest pain c/b hiccups for the last 2 days undergoing ischemic evaluation per cardiology. Pt s/p RRT x 2 for AMS. MICU consulted and accepting for airway monitoring in setting of baclofen toxicity +/- stroke now pending trach. IR consulted for G-J tube placement.     Allergies: No Known Allergies    Medications (Abx/Cardiac/Anticoagulation/Blood Products)    alteplase for catheter clearance: 2 milliGRAM(s) Catheter (05-13 @ 21:09)  alteplase for catheter clearance: 2 milliGRAM(s) Catheter (05-13 @ 21:09)  aspirin  chewable: 81 milliGRAM(s) Oral (05-15 @ 11:29)  heparin  Infusion.: 1000 Unit(s)/Hr IV Continuous (05-13 @ 18:20)  meropenem  IVPB: 100 mL/Hr IV Intermittent (05-14 @ 19:50)    Data:    T(C): 35.8  HR: 70  BP: 138/78  RR: 12  SpO2: 100%    -WBC 9.67 / HgB 7.2 / Hct 20.5 / Plt 305  -Na 137 / Cl 95 / BUN 93 / Glucose 193  -K 3.9 / CO2 19 / Cr 6.91  -ALT 8 / Alk Phos 103 / T.Bili 0.2  -INR 1.18 / PTT 25.9      Radiology:     Assessment/Plan:     -- CT reviewed, no percutaneous window for placement  -- no IR intervention at this time    --  Martha Arvizu, PGY-3  Interventional Radiology   Available on [employer] Teams    - Non-emergent consults: Place IR consult order in [place]  - Emergent issues (pager): [facility] [phone]; [facility] [phone]; [phone]  - Scheduling questions: [facility] [phone]; [facility] [phone]  - Clinic/outpatient booking: [facility] [phone]; [facility] [phone]

## 2024-05-15 NOTE — PROGRESS NOTE ADULT - ASSESSMENT
72 y/o M PMhx HTN, ESRD (on HD M/W/F), DM who presented with hiccups x 2 days and chest pain found to have peaked T waves. Hospital course c/b AMS s/p RRT on 5/1 and 5/2. Vascular consulted 2/2 RUE AVF access unable to be used s/p R femoral shiley placed for emergent HD. Transferred to MICU and intubated 5/2 for airway protection.     sepsis  fevers noted since 5/10  blood cx 5/10 w/ Bacillus cereus in 1/4 bottles  unclear whether true pathogen, pt also w/ fever, leukocytosis  B. cereus usually associated w/ GI source, however, has been implicated in central line infections  repeat blood cultures 5/11- NGTD   CT abd/pelvs- Nonocclusive R common iliac vein deep venous thrombosis. elevated R hemidiaphragm and RLL atelectasis  s/p meropenem x 5 days, completed 5/14    AMS, CVA  TTE- no evidence of valvular disease  s/p LP 5/2, cell count not consistent w/ bacterial meningitis, CSF PCR- negative  MRI- Punctate foci of restricted diffusion in the left talya and left cerebellum with associated T2 prolongation consistent with acute infarcts  noted tentative plan for trach and PEG    Recommendations  complete 10 day course of vanc for B cereus bacteremia w/ last day 5/20  random vanc level today pending  - dose by level post-HD    Steven Torres M.D.  [facility], Division of Infectious Diseases  [phone]  After 5pm on weekdays and all day on weekends - please call [phone]  72 y/o M PMhx HTN, ESRD (on HD M/W/F), DM who presented with hiccups x 2 days and chest pain found to have peaked T waves. Hospital course c/b AMS s/p RRT on 5/1 and 5/2. Vascular consulted 2/2 RUE AVF access unable to be used s/p R femoral shiley placed for emergent HD. Transferred to MICU and intubated 5/2 for airway protection.     sepsis  fevers noted since 5/10  blood cx 5/10 w/ Bacillus cereus in 1/4 bottles  unclear whether true pathogen, pt also w/ fever, leukocytosis  B. cereus usually associated w/ GI source, however, has been implicated in central line infections  repeat blood cultures 5/11- NGTD   CT abd/pelvs- Nonocclusive R common iliac vein deep venous thrombosis. elevated R hemidiaphragm and RLL atelectasis  s/p meropenem x 5 days, completed 5/14    AMS, CVA  TTE- no evidence of valvular disease  s/p LP 5/2, cell count not consistent w/ bacterial meningitis, CSF PCR- negative  MRI- Punctate foci of restricted diffusion in the left talya and left cerebellum with associated T2 prolongation consistent with acute infarcts  noted tentative plan for trach and PEG    Recommendations  complete 10 day course of vanc for B cereus bacteremia w/ last day 5/20  random vanc level today pending  - dose by level post-HD    repeat blood cultures have been negative  no objection from ID perspective to proceed w/ fistulogram    Steven Torres M.D.  [facility], Division of Infectious Diseases  [phone]  After 5pm on weekdays and all day on weekends - please call [phone]  72 y/o M PMhx HTN, ESRD (on HD M/W/F), DM who presented with hiccups x 2 days and chest pain found to have peaked T waves. Hospital course c/b AMS s/p RRT on 5/1 and 5/2. Vascular consulted 2/2 RUE AVF access unable to be used s/p R femoral shiley placed for emergent HD. Transferred to MICU and intubated 5/2 for airway protection.     sepsis  fevers noted since 5/10  blood cx 5/10 w/ Bacillus cereus in 1/4 bottles  unclear whether true pathogen, pt also w/ fever, leukocytosis  B. cereus usually associated w/ GI source, however, has been implicated in central line infections  repeat blood cultures 5/11- NGTD   CT abd/pelvs- Nonocclusive R common iliac vein deep venous thrombosis. elevated R hemidiaphragm and RLL atelectasis  s/p meropenem x 5 days, completed 5/14    AMS, CVA  TTE- no evidence of valvular disease  s/p LP 5/2, cell count not consistent w/ bacterial meningitis, CSF PCR- negative  MRI- Punctate foci of restricted diffusion in the left talya and left cerebellum with associated T2 prolongation consistent with acute infarcts  s/p trach 5/14  noted tentative plan for PEG    Recommendations  complete 10 day course of vanc for B cereus bacteremia w/ last day 5/20  random vanc level today pending  - dose by level post-HD    repeat blood cultures have been negative  no objection from ID perspective to proceed w/ fistulogram    Steven Torres M.D.  [facility], Division of Infectious Diseases  [phone]  After 5pm on weekdays and all day on weekends - please call [phone]

## 2024-05-15 NOTE — PROGRESS NOTE ADULT - ASSESSMENT
73-year-old male past medical history hypertension, ESRD on dialysis Monday Wednesday Friday, diabetes insulin-dependent presents with hiccups patient endorses persistent hiccups for the last 2 days. Nephrology consulted for HD needs.    A/P  ESRD:  Center: [place]  Nephrologist: Dr. Hardwick  Access: R AVF  MWF schedule  Vascular for fistulogram   s/p femoral shiley on 5/1, now removed  s/p placement of IJ shiley 5/3  Stopped CRRT   Restarted IHD  s/p HD 5/7 UF 1778; treatment terminated early due to hypotension   S/P MRA head/neck w/ gadolinium --> Received HD on 5/9 and 5/10.  5/10 Will need to dialyze 3 days consecutively--> plan for HD on 5/11  5/10 Spoke to ICU; will UF net even tomorrow  5/11 shiley removed due to bacteremia; did not receive HD.  Line holiday  5/12 - BUN worsened; K up trending.    5/13 Shiley placed, s/p HD   HD today  Consent obtained and placed in ED chart  Renal diet  Monitor BMP    Hyperkalemia  Up trending.  Lokelma 10gm x2 doses 5/12  Stable  HD 5/13  Low K diet.  Monitor closely     HTN:  BP fluctuating.  Back on pressors and antihypertensives.  Care per ICU.  Monitor BP.    Anemia:  SILVA w/ HD; hold for Hgb >11.  Monitor Hb.    CKD-MBD  Check PTH  PO4 fluctuating; elevated  Consider increased sevelamer 800mg to 1600mg TID.  Low PO4 diet.  Monitor Ca, PO4 daily    Hypocalcemia:  In setting of CKD vs. hypoalbuminemia.  Optimize albumin.  Improving.  Monitor Ca.    D/W ICU team

## 2024-05-15 NOTE — PROGRESS NOTE ADULT - SUBJECTIVE AND OBJECTIVE BOX
Patient is a 73y Male     Patient is a 73y old  Male who presents with a chief complaint of Unstable angina, abn EKG (15 May 2024 07:51)      HPI:  73-year-old male past medical history hypertension, ESRD on dialysis Monday Wednesday Friday, diabetes insulin-dependent presents with hiccups patient endorses persistent hiccups for the last 2 days. found to have peaked T waves, a new finding. denies dizziness, N/V, denies CP, palps, abd pain (29 Apr 2024 21:15)      PAST MEDICAL & SURGICAL HISTORY:  Diabetes      Benign essential HTN      HLD (hyperlipidemia)      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      ESRD on hemodialysis      Arteriovenous fistula          MEDICATIONS  (STANDING):  alteplase for catheter clearance 2 milliGRAM(s) Catheter once  aspirin  chewable 81 milliGRAM(s) Oral daily  chlorhexidine 0.12% Liquid 15 milliLiter(s) Oral Mucosa every 12 hours  chlorhexidine 2% Cloths 1 Application(s) Topical <User Schedule>  dextrose 10% Bolus 125 milliLiter(s) IV Bolus once  dextrose 5%. 1000 milliLiter(s) (100 mL/Hr) IV Continuous <Continuous>  dextrose 5%. 1000 milliLiter(s) (50 mL/Hr) IV Continuous <Continuous>  dextrose 50% Injectable 12.5 Gram(s) IV Push once  dextrose 50% Injectable 25 Gram(s) IV Push once  epoetin reilly (EPOGEN) Injectable 15704 Unit(s) IV Push <User Schedule>  glucagon  Injectable 1 milliGRAM(s) IntraMuscular once  insulin lispro (ADMELOG) corrective regimen sliding scale   SubCutaneous every 6 hours  meropenem  IVPB 500 milliGRAM(s) IV Intermittent every 24 hours  norepinephrine Infusion 0.05 MICROgram(s)/kG/Min (4.93 mL/Hr) IV Continuous <Continuous>  petrolatum Ophthalmic Ointment 1 Application(s) Both EYES two times a day  propofol Infusion 19.987 MICROgram(s)/kG/Min (6.31 mL/Hr) IV Continuous <Continuous>  sevelamer carbonate Powder 800 milliGRAM(s) Oral every 8 hours  sodium bicarbonate 650 milliGRAM(s) Oral every 8 hours  sodium chloride 3%  Inhalation 4 milliLiter(s) Inhalation every 12 hours      Allergies    No Known Allergies    Intolerances        SOCIAL HISTORY:  Denies ETOh,Smoking,     FAMILY HISTORY:  FHx: diabetes mellitus (Father, Aunt)        REVIEW OF SYSTEMS:    CONSTITUTIONAL: No weakness, fevers or chills  EYES/ENT: No visual changes;  No vertigo or throat pain   NECK: No pain or stiffness  RESPIRATORY: No cough, wheezing, hemoptysis; No shortness of breath  CARDIOVASCULAR: No chest pain or palpitations  GASTROINTESTINAL: No abdominal or epigastric pain. No nausea, vomiting, or hematemesis; No diarrhea or constipation. No melena or hematochezia.  GENITOURINARY: No dysuria, frequency or hematuria  NEUROLOGICAL: No numbness or weakness  SKIN: No itching, burning, rashes, or lesions   All other review of systems is negative unless indicated above.    VITAL:  T(C): , Max: 37.8 (05-15-24 @ 04:00)  T(F): , Max: 100.1 (05-15-24 @ 04:00)  HR: 72 (05-15-24 @ 07:29)  BP: 161/62 (05-15-24 @ 07:00)  BP(mean): 88 (05-15-24 @ 07:00)  RR: 12 (05-15-24 @ 07:00)  SpO2: 100% (05-15-24 @ 07:29)  Wt(kg): --    I and O's:    05-13 @ 07:01  -  05-14 @ 07:00  --------------------------------------------------------  IN: 1777 mL / OUT: 800 mL / NET: 977 mL    05-14 @ 07:01  -  05-15 @ 07:00  --------------------------------------------------------  IN: 804.8 mL / OUT: 0 mL / NET: 804.8 mL          PHYSICAL EXAM:    Constitutional: NAD  HEENT: PERRLA,   Neck: No JVD  Respiratory: CTA B/L  Cardiovascular: S1 and S2  Gastrointestinal: BS+, soft, NT/ND  Extremities: No peripheral edema  Neurological: A/O x 3, no focal deficits  Psychiatric: Normal mood, normal affect  : No Abbasi  Skin: No rashes  Access: Not applicable  Back: No CVA tenderness    LABS:                        7.2    9.67  )-----------( 305      ( 15 May 2024 01:15 )             20.5     05-15    137  |  95<L>  |  93<H>  ----------------------------<  193<H>  3.9   |  19<L>  |  6.91<H>    Ca    7.9<L>      15 May 2024 01:15  Phos  6.3     05-15  Mg     2.50     05-15    TPro  5.7<L>  /  Alb  2.3<L>  /  TBili  0.2  /  DBili  x   /  AST  15  /  ALT  8   /  AlkPhos  103  05-15          RADIOLOGY & ADDITIONAL STUDIES:

## 2024-05-15 NOTE — PROGRESS NOTE ADULT - SUBJECTIVE AND OBJECTIVE BOX
Neurology Progress Note    S: Patient seen and examined. Remains sedated, s/p trach      Medications: MEDICATIONS  (STANDING):  aspirin  chewable 81 milliGRAM(s) Oral daily  chlorhexidine 0.12% Liquid 15 milliLiter(s) Oral Mucosa every 12 hours  chlorhexidine 2% Cloths 1 Application(s) Topical <User Schedule>  dextrose 10% Bolus 125 milliLiter(s) IV Bolus once  dextrose 5%. 1000 milliLiter(s) (100 mL/Hr) IV Continuous <Continuous>  dextrose 5%. 1000 milliLiter(s) (50 mL/Hr) IV Continuous <Continuous>  dextrose 50% Injectable 12.5 Gram(s) IV Push once  dextrose 50% Injectable 25 Gram(s) IV Push once  epoetin reilly (EPOGEN) Injectable 81413 Unit(s) IV Push <User Schedule>  glucagon  Injectable 1 milliGRAM(s) IntraMuscular once  insulin lispro (ADMELOG) corrective regimen sliding scale   SubCutaneous every 6 hours  norepinephrine Infusion 0.05 MICROgram(s)/kG/Min (4.93 mL/Hr) IV Continuous <Continuous>  petrolatum Ophthalmic Ointment 1 Application(s) Both EYES two times a day  propofol Infusion 19.987 MICROgram(s)/kG/Min (6.31 mL/Hr) IV Continuous <Continuous>  sevelamer carbonate Powder 800 milliGRAM(s) Oral every 8 hours  sodium bicarbonate 650 milliGRAM(s) Oral every 8 hours  sodium chloride 3%  Inhalation 4 milliLiter(s) Inhalation every 12 hours    MEDICATIONS  (PRN):  dextrose Oral Gel 15 Gram(s) Oral once PRN Blood Glucose LESS THAN 70 milliGRAM(s)/deciliter  sodium chloride 0.9% lock flush 10 milliLiter(s) IV Push every 1 hour PRN Pre/post blood products, medications, blood draw, and to maintain line patency       Vitals:  Vital Signs Last 24 Hrs  T(C): 37.8 (15 May 2024 08:00), Max: 37.8 (15 May 2024 04:00)  T(F): 100 (15 May 2024 08:00), Max: 100.1 (15 May 2024 04:00)  HR: 62 (15 May 2024 10:54) (62 - 95)  BP: 149/64 (15 May 2024 10:54) (94/57 - 161/62)  BP(mean): 84 (15 May 2024 10:00) (63 - 88)  RR: 12 (15 May 2024 10:54) (11 - 20)  SpO2: 99% (15 May 2024 10:54) (96% - 100%)    Parameters below as of 15 May 2024 10:54  Patient On (Oxygen Delivery Method): ventilator        General Exam:   General Appearance: intubated, sedated  Head: Normocephalic, atraumatic and no dysmorphic features  Ear, Nose, and Throat: Moist mucous membranes  CVS: S1S2+  Resp: mechanical  Abd: soft NTND  Extremities: No edema, no cyanosis  Skin: No bruises, no rashes     Neurological Exam:  Mental Status: intubated, sedated sedated. Does not open eyes or follow commands. No BTT bialterally   Cranial Nerves: pupils 1-2mm, no facial asymmetry  Motor: dec tone througout, no spontaneous movement  Sensation: no Wd to noxious       I personally reviewed the below data/images/labs:    LABS:                          7.2    9.67  )-----------( 305      ( 15 May 2024 01:15 )             20.5     05-15    137  |  95<L>  |  93<H>  ----------------------------<  193<H>  3.9   |  19<L>  |  6.91<H>    Ca    7.9<L>      15 May 2024 01:15  Phos  6.3     05-15  Mg     2.50     05-15    TPro  5.7<L>  /  Alb  2.3<L>  /  TBili  0.2  /  DBili  x   /  AST  15  /  ALT  8   /  AlkPhos  103  05-15    LIVER FUNCTIONS - ( 15 May 2024 01:15 )  Alb: 2.3 g/dL / Pro: 5.7 g/dL / ALK PHOS: 103 U/L / ALT: 8 U/L / AST: 15 U/L / GGT: x           PT/INR - ( 15 May 2024 01:15 )   PT: 13.3 sec;   INR: 1.18 ratio         PTT - ( 15 May 2024 01:15 )  PTT:25.9 sec  Urinalysis Basic - ( 15 May 2024 01:15 )    Color: x / Appearance: x / SG: x / pH: x  Gluc: 193 mg/dL / Ketone: x  / Bili: x / Urobili: x   Blood: x / Protein: x / Nitrite: x   Leuk Esterase: x / RBC: x / WBC x   Sq Epi: x / Non Sq Epi: x / Bacteria: x            CT Head No Cont:  (01 May 2024 18:11)  < from: CT Head No Cont (05.01.24 @ 18:11) >    ACC: [id number] EXAM:  CT BRAIN   ORDERED BY: SHELBY MOREL     PROCEDURE DATE:  05/01/2024          INTERPRETATION:  CT OF THE HEAD WITHOUT CONTRAST    CLINICAL INDICATION: Lethargy.    TECHNIQUE: Volumetric CT acquisition was performed through the brain and   reviewed using brain and bone window technique.      COMPARISON: CT head from 1/17/2024    FINDINGS:    The ventricular and sulcal size and configuration is age appropriate.     There is no acute loss of gray-white differentiation. Stable bilateral   inferior frontal encephalomalacia and gliosis likely related to remote   trauma.    There is no evidence of mass effect, midline shift, acute intracranial   hemorrhage, or extra-axial collections.     The calvarium is intact. The paranasalsinuses are clear.The mastoid air   cells are predominantly clear. The orbits are unremarkable.      IMPRESSION:  No acute intracranial hemorrhage or acute territorial infarction. Chronic   findings as above.    --- End of Report ---          GILDARDO KHAN; Resident Radiologist  This document has been electronically signed.  LADARIUS DENNY MD; Attending Radiologist  This document has been electronically signed. May  1 2024  7:54PM    < end of copied text >      EEG Classification / Summary:  Abnormal EEG in the awake, drowsy states.   -Generalized sharp waves with triphasic morphology, initially appearing as frequent GPDs at 1-1.5 Hz, decreasing in prevalence later in recording.  -Variable moderate to mild diffuse slowing.  -No electrographic seizures are captured.     Clinical Impression:  -Generalized sharp waves with triphasic morphology can be seen in toxic-metabolic encephalopathies and indicate some risk of generalized seizures, especially when at higher frequencies than in this study. These decrease in prevalence over the course of recording.  -Variable moderate to mild diffuse cerebral dysfunction is nonspecific in etiology.   -No seizures x2 days of recording.    m< from: MR Head w/wo IV Cont (05.06.24 @ 18:10) >    ACC: [id number] EXAM:  MR BRAIN WAW IC   ORDERED BY: DOLORES QUICK     PROCEDURE DATE:  05/06/2024          INTERPRETATION:  CLINICAL INDICATION: Altered mental status.    TECHNIQUE: Multi-planar, multi-sequence magnetic resonance imaging of the   brain was performed with and without intravenous contrast.    CONTRAST: Post-contrast MR images were obtained following infusion of 5   mL of Gadavist    COMPARISON: No prior studies are available for comparison    FINDINGS:    VENTRICLES AND SULCI:  Normal.  INTRA-AXIAL: Punctate foci of restricted diffusion in the left talya and   left cerebellum with associated T2 prolongation consistent with acute   infarcts. No acute intracranial hemorrhage. Encephalomalacia/gliosis   noted in the inferior frontal lobes. No abnormal enhancement. There are   patchy and confluent foci of hyperintense T2 signal within the   subcortical and periventricular white matter which are nonspecific but   likely related to chronic microvascular ischemic disease.  EXTRA-AXIAL:  No mass or collection.  VISUALIZED SINUSES: Mild polypoid mucosal thickening. Fluid noted in the   nasopharynx.  VISUALIZED MASTOIDS:  Clear.  CALVARIUM:  Normal.  CAROTID FLOW VOIDS: Normal.  MISCELLANEOUS:  None.    IMPRESSION: PUNCTATE FOCI OF RESTRICTED DIFFUSION IN THE LEFT TALYA AND   LEFT CEREBELLUM WITH ASSOCIATED T2 PROLONGATION CONSISTENT WITH ACUTE   INFARCTS. NO ACUTE INTRACRANIAL HEMORRHAGE. NO AREA OF ABNORMAL   ENHANCEMENT.    < end of copied text >    m< from: MR Angio Head No Cont (05.09.24 @ 15:58) >    ACC: [id number] EXAM:  MR ANGIO NECK WAW IC   ORDERED BY: OMAR NESBITT     ACC: [id number] EXAM:  MR ANGIO BRAIN   ORDERED BY: OMAR NESBITT     PROCEDURE DATE:  05/09/2024          INTERPRETATION:  .    CLINICAL INFORMATION: Stroke workup. Talya/cerebellar stroke.    TECHNIQUE: MRA images through the neck and [place] were obtained   using a combination of 2-D and 3-D time-of-flight acquisition. Post   contrast MR angiography of the neck was also performed. The data was then   reformatted intoa volumetric data set and reviewed as rotational MIP   images. 5 cc's of IV Gadavist was administered without immediate   complication. 2.5 cc's was discarded.    COMPARISON: Prior head CT exam dated 5/1/2024.    FINDINGS:    MRA Neck: There is a standard anatomic configuration to the aortic arch.    The origins of the great vessels appear unremarkable. The bilateral   common carotid arteries and carotid bifurcations appear unremarkable.    The bilateral cervical internal carotid arteries are within normal limits.    The origins of the bilateral vertebral arteries are normal. The bilateral   cervical vertebral arteries are normal in course and caliber.    MRA [place]: The bilateral intracranial internal carotid,   anterior, and middle cerebral arteries appear unremarkable.    The anterior and bilateral posterior communicating arteries are notable.    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 Otherwise, the   bilateral intradural vertebral arteries, vertebrobasilar junction,   basilar artery, and basilar tip appear unremarkable as well as the   bilateral posterior cerebral arteries.    IMPRESSION: No large vessel occlusion or stenosis.    < end of copied text >

## 2024-05-15 NOTE — PROGRESS NOTE ADULT - SUBJECTIVE AND OBJECTIVE BOX
Patient is a 73y old  Male who presents with a chief complaint of Unstable angina, abn EKG (15 May 2024 18:59)      SUBJECTIVE / OVERNIGHT EVENTS: s/p trache placement by pulm, IR unable to find a window for G-J tube. HD via IJ. remains vented, sedated and on pressors. on Van for B .cereus sepsis/ bacteremia     MEDICATIONS  (STANDING):  acetaminophen   IVPB .. 1000 milliGRAM(s) IV Intermittent once  aspirin  chewable 81 milliGRAM(s) Oral daily  chlorhexidine 0.12% Liquid 15 milliLiter(s) Oral Mucosa every 12 hours  chlorhexidine 2% Cloths 1 Application(s) Topical <User Schedule>  dextrose 10% Bolus 125 milliLiter(s) IV Bolus once  dextrose 5%. 1000 milliLiter(s) (50 mL/Hr) IV Continuous <Continuous>  dextrose 5%. 1000 milliLiter(s) (100 mL/Hr) IV Continuous <Continuous>  dextrose 50% Injectable 12.5 Gram(s) IV Push once  dextrose 50% Injectable 25 Gram(s) IV Push once  epoetin reilly (EPOGEN) Injectable 22131 Unit(s) IV Push <User Schedule>  glucagon  Injectable 1 milliGRAM(s) IntraMuscular once  insulin lispro (ADMELOG) corrective regimen sliding scale   SubCutaneous every 6 hours  norepinephrine Infusion 0.05 MICROgram(s)/kG/Min (4.93 mL/Hr) IV Continuous <Continuous>  petrolatum Ophthalmic Ointment 1 Application(s) Both EYES two times a day  propofol Infusion 19.987 MICROgram(s)/kG/Min (6.31 mL/Hr) IV Continuous <Continuous>  sevelamer carbonate Powder 800 milliGRAM(s) Oral every 8 hours  sodium bicarbonate 650 milliGRAM(s) Oral every 8 hours  sodium chloride 3%  Inhalation 4 milliLiter(s) Inhalation every 12 hours    MEDICATIONS  (PRN):  dextrose Oral Gel 15 Gram(s) Oral once PRN Blood Glucose LESS THAN 70 milliGRAM(s)/deciliter  sodium chloride 0.9% lock flush 10 milliLiter(s) IV Push every 1 hour PRN Pre/post blood products, medications, blood draw, and to maintain line patency      Vital Signs Last 24 Hrs  T(F): 96.5 (05-15-24 @ 14:08), Max: 100.1 (05-15-24 @ 04:00)  HR: 78 (05-15-24 @ 20:00) (60 - 79)  BP: 152/62 (05-15-24 @ 20:00) (101/51 - 161/62)  RR: 12 (05-15-24 @ 20:00) (12 - 20)  SpO2: 99% (05-15-24 @ 20:00) (99% - 100%)  Telemetry:   CAPILLARY BLOOD GLUCOSE      POCT Blood Glucose.: 245 mg/dL (15 May 2024 17:56)  POCT Blood Glucose.: 159 mg/dL (15 May 2024 11:18)  POCT Blood Glucose.: 292 mg/dL (15 May 2024 05:37)  POCT Blood Glucose.: 162 mg/dL (14 May 2024 23:34)    I&O's Summary    14 May 2024 07:01  -  15 May 2024 07:00  --------------------------------------------------------  IN: 809.8 mL / OUT: 0 mL / NET: 809.8 mL    15 May 2024 07:01  -  15 May 2024 21:11  --------------------------------------------------------  IN: 611.2 mL / OUT: 1400 mL / NET: -788.8 mL        PHYSICAL EXAM:  GENERAL: NAD, well-developed  HEAD:  Atraumatic, Normocephalic  EYES: EOMI, PERRLA, conjunctiva and sclera clear  NECK: Supple, No JVD  CHEST/LUNG: Clear to auscultation bilaterally; No wheeze  HEART: Regular rate and rhythm; No murmurs, rubs, or gallops  ABDOMEN: Soft, Nontender, Nondistended; Bowel sounds present  EXTREMITIES:  2+ Peripheral Pulses, No clubbing, cyanosis, or edema  PSYCH: AAOx3  NEUROLOGY: non-focal  SKIN: No rashes or lesions    LABS:                        7.2    9.67  )-----------( 305      ( 15 May 2024 01:15 )             20.5     05-15    137  |  95<L>  |  93<H>  ----------------------------<  193<H>  3.9   |  19<L>  |  6.91<H>    Ca    7.9<L>      15 May 2024 01:15  Phos  6.3     05-15  Mg     2.50     05-15    TPro  5.7<L>  /  Alb  2.3<L>  /  TBili  0.2  /  DBili  x   /  AST  15  /  ALT  8   /  AlkPhos  103  05-15    PT/INR - ( 15 May 2024 01:15 )   PT: 13.3 sec;   INR: 1.18 ratio         PTT - ( 15 May 2024 01:15 )  PTT:25.9 sec      Urinalysis Basic - ( 15 May 2024 01:15 )    Color: x / Appearance: x / SG: x / pH: x  Gluc: 193 mg/dL / Ketone: x  / Bili: x / Urobili: x   Blood: x / Protein: x / Nitrite: x   Leuk Esterase: x / RBC: x / WBC x   Sq Epi: x / Non Sq Epi: x / Bacteria: x        RADIOLOGY & ADDITIONAL TESTS:    Imaging Personally Reviewed:    Consultant(s) Notes Reviewed:      Care Discussed with Consultants/Other Providers:

## 2024-05-15 NOTE — PROGRESS NOTE ADULT - ASSESSMENT
73M PMHx HTN, ESRD, IDDM p/w hiccups and started on baclofen with concern for AMS overnight and desaturation, ?cheye nascimento respirations. Labs with numerous metabolic derangements. CTH with bifrontal encephalomalacia, likley traumatic.   WBC inc significantly, now intubated. Exam limited as on propofol, also on pressors.   s/p LP for meningitis concerns however appears bland - not on antibiotics  EEG with GPDs, no seizures  MR brain with punctuate L pontine and L cerebellar infarcts  MRA H/N with mild basilar fenestration, otherwise neg  mental status improving post extubation but now reintubated for recurrent aspiration   s/p trach, remains sedated    AMS of unclear etiology - ? baclofen toxicity, no evidence of seizures. 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

## 2024-05-15 NOTE — PROGRESS NOTE ADULT - SUBJECTIVE AND OBJECTIVE BOX
Progress Note    JIMMY Glasgow MD 73y (1950) Male [id number]  04-29-24 (16d)      Chief Complaint: Unstable angina, abn EKG    Subjective:  No acute events overnight. Patient seen and examined at bedside.    Review of Systems:  CONSTITUTIONAL: No fever, weight loss, or fatigue  EYES: No eye pain, visual disturbances, or discharge  ENMT:  No difficulty hearing, tinnitus, vertigo; No sinus or throat pain  NECK: No pain or stiffness  RESPIRATORY: No cough, wheezing, chills or hemoptysis; No shortness of breath  CARDIOVASCULAR: No chest pain, palpitations, dizziness, or leg swelling  GASTROINTESTINAL: No abdominal or epigastric pain. No nausea, vomiting, or hematemesis; No diarrhea or constipation. No melena or hematochezia.  GENITOURINARY: No dysuria, frequency, hematuria, or incontinence  NEUROLOGICAL: No headaches, memory loss, loss of strength, numbness, or tremors  SKIN: No itching, burning, rashes, or lesions   MUSCULOSKELETAL: No joint pain or swelling; No muscle, back, or extremity pain      PAST MEDICAL & SURGICAL HISTORY:  Diabetes [250.00]    Stage 5 chronic kidney disease not on chronic dialysis [N18.5]    Benign essential HTN [I10]    HLD (hyperlipidemia) [E78.5]    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 [N18.6]    ESRD on hemodialysis [N18.6]    No significant past surgical history [522239612]    AVF (arteriovenous fistula) [I77.0]    Refined to: Arteriovenous fistula    Arteriovenous fistula [I77.0]      alteplase for catheter clearance 2 milliGRAM(s) Catheter once  aspirin  chewable 81 milliGRAM(s) Oral daily  chlorhexidine 0.12% Liquid 15 milliLiter(s) Oral Mucosa every 12 hours  chlorhexidine 2% Cloths 1 Application(s) Topical <User Schedule>  chlorhexidine 4% Liquid 1 Application(s) Topical <User Schedule>  dextrose 10% Bolus 125 milliLiter(s) IV Bolus once  dextrose 5%. 1000 milliLiter(s) IV Continuous <Continuous>  dextrose 5%. 1000 milliLiter(s) IV Continuous <Continuous>  dextrose 50% Injectable 12.5 Gram(s) IV Push once  dextrose 50% Injectable 25 Gram(s) IV Push once  dextrose Oral Gel 15 Gram(s) Oral once PRN  epoetin reilly (EPOGEN) Injectable 42909 Unit(s) IV Push <User Schedule>  glucagon  Injectable 1 milliGRAM(s) IntraMuscular once  insulin lispro (ADMELOG) corrective regimen sliding scale   SubCutaneous every 6 hours  meropenem  IVPB 500 milliGRAM(s) IV Intermittent every 24 hours  norepinephrine Infusion 0.05 MICROgram(s)/kG/Min IV Continuous <Continuous>  petrolatum Ophthalmic Ointment 1 Application(s) Both EYES two times a day  propofol Infusion 19.987 MICROgram(s)/kG/Min IV Continuous <Continuous>  sevelamer carbonate Powder 800 milliGRAM(s) Oral every 8 hours  sodium bicarbonate 650 milliGRAM(s) Oral every 8 hours  sodium chloride 0.9% lock flush 10 milliLiter(s) IV Push every 1 hour PRN  sodium chloride 3%  Inhalation 4 milliLiter(s) Inhalation every 12 hours    Objective:  T(C): 37.8 (05-15-24 @ 04:00), Max: 37.8 (05-15-24 @ 04:00)  HR: 71 (05-15-24 @ 06:00) (62 - 95)  BP: 154/61 (05-15-24 @ 06:00) (94/57 - 159/58)  RR: 12 (05-15-24 @ 06:00) (11 - 20)  SpO2: 100% (05-15-24 @ 06:00) (96% - 100%)    Physical exam:  GENERAL: NAD, well-developed  HEAD:  Atraumatic, Normocephalic  EYES: EOMI, PERRLA, conjunctiva and sclera clear  NECK: Supple, No JVD  CHEST/LUNG: Clear to auscultation bilaterally; No wheeze  HEART: Regular rate and rhythm; No murmurs, rubs, or gallops  ABDOMEN: Soft, Nontender, Nondistended; Bowel sounds present  EXTREMITIES:  2+ Peripheral Pulses, No clubbing, cyanosis, or edema  PSYCH: AAOx3  NEUROLOGY: non-focal  SKIN: No rashes or lesions      05-13-24 @ 07:01  -  05-14-24 @ 07:00  --------------------------------------------------------  IN: 1777 mL / OUT: 800 mL / NET: 977 mL    05-14-24 @ 07:01  -  05-15-24 @ 06:54  --------------------------------------------------------  IN: 804.8 mL / OUT: 0 mL / NET: 804.8 mL        CAPILLARY BLOOD GLUCOSE      (05-15 @ 01:15)                      7.2  9.67 )-----------( 305                 20.5    Neutrophils = 8.29 (85.7%)  Lymphocytes = 0.63 (6.5%)  Eosinophils = 0.16 (1.7%)  Basophils = 0.02 (0.2%)  Monocytes = 0.52 (5.4%)  Bands = --%    05-15    137  |  95<L>  |  93<H>  ----------------------------<  193<H>  3.9   |  19<L>  |  6.91<H>    Ca    7.9<L>      15 May 2024 01:15  Phos  6.3     05-15  Mg     2.50     05-15    TPro  5.7<L>  /  Alb  2.3<L>  /  TBili  0.2  /  DBili  x   /  AST  15  /  ALT  8   /  AlkPhos  103  05-15    ( 15 May 2024 01:15 )   PT: 13.3 sec;   INR: 1.18 ratio;       PTT:25.9 sec      RVP:      Arterial Blood Gas:  05-15 @ 03:30  7.51/30/142/24/99.4/0.9  ABG lactate: --  Arterial Blood Gas:  05-15 @ 01:15  7.64/18/163/19/98.8/-1.7  ABG lactate: --      Tox:         Urinalysis Basic - ( 15 May 2024 01:15 )    Color: x / Appearance: x / SG: x / pH: x  Gluc: 193 mg/dL / Ketone: x  / Bili: x / Urobili: x   Blood: x / Protein: x / Nitrite: x   Leuk Esterase: x / RBC: x / WBC x   Sq Epi: x / Non Sq Epi: x / Bacteria: x        WBC Trend: 9.67<--, 13.53<--, 21.36<--    Hb Trend: 7.2<--, 8.4<--, 8.6<--, 9.4<--, 12.4<--        New imaging in last 24 hours:  Consult notes reviewed:

## 2024-05-15 NOTE — PROGRESS NOTE ADULT - SUBJECTIVE AND OBJECTIVE BOX
[facility], Division of Infectious Diseases  AUGUST Carbajal Y. Patel, S. Shah, G. Casimir  [phone]  ([phone] - weekdays after 5pm and weekends)    Name: JIMMY BLAND  Age/Gender: 73y Male  MRN: [id number]    Interval History:  Notes reviewed.   No concerning overnight events.  Afebrile.     Allergies: No Known Allergies      Objective:  Vitals:   T(F): 100 (05-15-24 @ 08:00), Max: 100.1 (05-15-24 @ 04:00)  HR: 60 (05-15-24 @ 11:00) (60 - 95)  BP: 158/66 (05-15-24 @ 11:00) (94/57 - 161/62)  RR: 12 (05-15-24 @ 11:00) (11 - 20)  SpO2: 99% (05-15-24 @ 10:54) (96% - 100%)  Physical Examination:  General: no acute distress  HEENT: anicteric, NGT, tracheostomy, on vanc  Lungs: clear to auscultation anteriorly  Heart: S1, S2, normal rate  Abdomen: Soft. Nondistended.  Extremities: No LE edema.   Skin: Warm. Dry.     Laboratory Studies:  CBC:                       7.2    9.67  )-----------( 305      ( 15 May 2024 01:15 )             20.5     WBC Trend:  9.67 05-15-24 @ 01:15  13.53 05-14-24 @ 00:13  21.36 05-13-24 @ 02:40  31.30 05-12-24 @ 20:28  28.22 05-12-24 @ 03:00  27.31 05-11-24 @ 00:20  14.29 05-10-24 @ 00:50  12.78 05-09-24 @ 23:57  9.43 05-09-24 @ 00:50    CMP: 05-15    137  |  95<L>  |  93<H>  ----------------------------<  193<H>  3.9   |  19<L>  |  6.91<H>    Ca    7.9<L>      15 May 2024 01:15  Phos  6.3     05-15  Mg     2.50     05-15    TPro  5.7<L>  /  Alb  2.3<L>  /  TBili  0.2  /  DBili  x   /  AST  15  /  ALT  8   /  AlkPhos  103  05-15      LIVER FUNCTIONS - ( 15 May 2024 01:15 )  Alb: 2.3 g/dL / Pro: 5.7 g/dL / ALK PHOS: 103 U/L / ALT: 8 U/L / AST: 15 U/L / GGT: x           Vancomycin Level, Random: 26.9 ug/mL (05-13-24 @ 13:50)  Vancomycin Level, Random: 39.0 ug/mL (05-12-24 @ 03:00)  Vancomycin Level, Trough: 14.9 ug/mL (05-10-24 @ 21:00)    Urinalysis Basic - ( 15 May 2024 01:15 )    Color: x / Appearance: x / SG: x / pH: x  Gluc: 193 mg/dL / Ketone: x  / Bili: x / Urobili: x   Blood: x / Protein: x / Nitrite: x   Leuk Esterase: x / RBC: x / WBC x   Sq Epi: x / Non Sq Epi: x / Bacteria: x      Microbiology: reviewed     Culture - Fungal, Bronchial (collected 05-12-24 @ 15:06)  Source: .Bronchial Bronchial Lavage  Preliminary Report (05-13-24 @ 08:55):    Testing in progress    Culture - Acid Fast - Bronchial w/Smear (collected 05-12-24 @ 15:06)  Source: .Bronchial Bronchial Lavage    Culture - Bronchial (collected 05-12-24 @ 15:06)  Source: .Bronchial Bronchial Lavage  Gram Stain (05-12-24 @ 22:40):    Moderate polymorphonuclear leukocytes per low power field    Rare Squamous epithelial cells per low power field    Rare Gram Positive Cocci in Clusters per oil power field  Final Report (05-14-24 @ 08:10):    Normal Respiratory Jocelyn present    Culture - Blood (collected 05-11-24 @ 14:00)  Source: .Blood Blood-Peripheral  Preliminary Report (05-14-24 @ 19:02):    No growth at 72 Hours    Culture - Blood (collected 05-11-24 @ 14:00)  Source: .Blood Blood-Peripheral  Preliminary Report (05-14-24 @ 19:02):    No growth at 72 Hours    Culture - Sputum (collected 05-10-24 @ 16:45)  Source: .Sputum Sputum  Gram Stain (05-10-24 @ 22:57):    Rare polymorphonuclear leukocytes per low power field    Rare Squamous epithelial cells per low power field    Rare Gram Negative Rods per oil power field  Final Report (05-12-24 @ 16:49):    Normal Respiratory Jocelyn present    Culture - Blood (collected 05-10-24 @ 04:00)  Source: .Blood Blood-Peripheral  Final Report (05-15-24 @ 07:01):    No growth at 5 days    Culture - Blood (collected 05-10-24 @ 03:45)  Source: .Blood Blood-Peripheral  Gram Stain (05-10-24 @ 19:34):    Growth in anaerobic bottle: Gram Positive Rods  Final Report (05-11-24 @ 14:47):    Growth in anaerobic bottle: Bacillus cereus "Susceptibilities not    performed"    Direct identification is available within approximately 3-5    hours either by Blood Panel Multiplexed PCR or Direct    MALDI-TOF. Details: [email or web address]  Organism: Blood Culture PCR (05-11-24 @ 14:47)  Organism: Blood Culture PCR (05-11-24 @ 14:47)      Method Type: PCR      -  Blood PCR Panel: NEG    Culture - Sputum (collected 05-08-24 @ 10:05)  Source: ET Tube ET Tube  Gram Stain (05-08-24 @ 20:37):    Few polymorphonuclear leukocytes per low power field    No Squamous epithelial cells per low power field    No organisms seen per oil power field  Final Report (05-10-24 @ 11:45):    Normal Respiratory Jocelyn present    Culture - Sputum (collected 05-04-24 @ 04:10)  Source: ET Tube ET Tube  Gram Stain (05-04-24 @ 13:40):    Few polymorphonuclear leukocytes seen per low power field    No Squamous epithelial cells per low power field    Numerous Gram Positive Rods seen per oil power field    Rare Gram Positive Cocci in Clusters seen per oil power field  Final Report (05-05-24 @ 17:14):    Normal Respiratory Jocelyn present    Culture - CSF with Gram Stain (collected 05-02-24 @ 12:15)  Source: .CSF CSF lumbar  Gram Stain (05-02-24 @ 13:51):    polymorphonuclear leukocytes    No organisms seen    by cytocentrifuge  Final Report (05-07-24 @ 08:05):    No growth at 5 days    Culture - Fungal, CSF (collected 05-02-24 @ 12:15)  Source: .CSF CSF lumbar  Preliminary Report (05-11-24 @ 23:02):    No fungus isolated at 1 week.    Culture - Blood (collected 05-02-24 @ 03:45)  Source: .Blood Blood-Peripheral  Final Report (05-07-24 @ 10:01):    No growth at 5 days    Culture - Blood (collected 05-02-24 @ 03:15)  Source: .Blood Blood-Venous  Final Report (05-07-24 @ 10:01):    No growth at 5 days        Radiology: reviewed     Medications:  aspirin  chewable 81 milliGRAM(s) Oral daily  chlorhexidine 0.12% Liquid 15 milliLiter(s) Oral Mucosa every 12 hours  chlorhexidine 2% Cloths 1 Application(s) Topical <User Schedule>  dextrose 10% Bolus 125 milliLiter(s) IV Bolus once  dextrose 5%. 1000 milliLiter(s) IV Continuous <Continuous>  dextrose 5%. 1000 milliLiter(s) IV Continuous <Continuous>  dextrose 50% Injectable 12.5 Gram(s) IV Push once  dextrose 50% Injectable 25 Gram(s) IV Push once  dextrose Oral Gel 15 Gram(s) Oral once PRN  epoetin reilly (EPOGEN) Injectable 53515 Unit(s) IV Push <User Schedule>  glucagon  Injectable 1 milliGRAM(s) IntraMuscular once  insulin lispro (ADMELOG) corrective regimen sliding scale   SubCutaneous every 6 hours  norepinephrine Infusion 0.05 MICROgram(s)/kG/Min IV Continuous <Continuous>  petrolatum Ophthalmic Ointment 1 Application(s) Both EYES two times a day  propofol Infusion 19.987 MICROgram(s)/kG/Min IV Continuous <Continuous>  sevelamer carbonate Powder 800 milliGRAM(s) Oral every 8 hours  sodium bicarbonate 650 milliGRAM(s) Oral every 8 hours  sodium chloride 0.9% lock flush 10 milliLiter(s) IV Push every 1 hour PRN  sodium chloride 3%  Inhalation 4 milliLiter(s) Inhalation every 12 hours    Antimicrobials:

## 2024-05-16 ENCOUNTER — TRANSCRIPTION ENCOUNTER (OUTPATIENT)
Age: 74
End: 2024-05-16

## 2024-05-16 LAB
ALBUMIN SERPL ELPH-MCNC: 2.3 G/DL — LOW (ref 3.3–5)
ALP SERPL-CCNC: 136 U/L — HIGH (ref 40–120)
ALT FLD-CCNC: 8 U/L — SIGNIFICANT CHANGE UP (ref 4–41)
ANION GAP SERPL CALC-SCNC: 16 MMOL/L — HIGH (ref 7–14)
APTT BLD: 28.4 SEC — SIGNIFICANT CHANGE UP (ref 24.5–35.6)
APTT BLD: 31.3 SEC — SIGNIFICANT CHANGE UP (ref 24.5–35.6)
AST SERPL-CCNC: 11 U/L — SIGNIFICANT CHANGE UP (ref 4–40)
BASOPHILS # BLD AUTO: 0.03 K/UL — SIGNIFICANT CHANGE UP (ref 0–0.2)
BASOPHILS NFR BLD AUTO: 0.3 % — SIGNIFICANT CHANGE UP (ref 0–2)
BILIRUB SERPL-MCNC: <0.2 MG/DL — SIGNIFICANT CHANGE UP (ref 0.2–1.2)
BLOOD GAS ARTERIAL - LYTES,HGB,ICA,LACT RESULT: SIGNIFICANT CHANGE UP
BUN SERPL-MCNC: 52 MG/DL — HIGH (ref 7–23)
CALCIUM SERPL-MCNC: 8.2 MG/DL — LOW (ref 8.4–10.5)
CHLORIDE SERPL-SCNC: 95 MMOL/L — LOW (ref 98–107)
CO2 SERPL-SCNC: 25 MMOL/L — SIGNIFICANT CHANGE UP (ref 22–31)
CREAT SERPL-MCNC: 4.55 MG/DL — HIGH (ref 0.5–1.3)
CULTURE RESULTS: SIGNIFICANT CHANGE UP
CULTURE RESULTS: SIGNIFICANT CHANGE UP
EGFR: 13 ML/MIN/1.73M2 — LOW
EOSINOPHIL # BLD AUTO: 0.31 K/UL — SIGNIFICANT CHANGE UP (ref 0–0.5)
EOSINOPHIL NFR BLD AUTO: 2.9 % — SIGNIFICANT CHANGE UP (ref 0–6)
GALACTOMANNAN AG SERPL-ACNC: 0.47 INDEX — SIGNIFICANT CHANGE UP (ref 0–0.49)
GLUCOSE BLDC GLUCOMTR-MCNC: 193 MG/DL — HIGH (ref 70–99)
GLUCOSE BLDC GLUCOMTR-MCNC: 208 MG/DL — HIGH (ref 70–99)
GLUCOSE BLDC GLUCOMTR-MCNC: 212 MG/DL — HIGH (ref 70–99)
GLUCOSE BLDC GLUCOMTR-MCNC: 212 MG/DL — HIGH (ref 70–99)
GLUCOSE BLDC GLUCOMTR-MCNC: 227 MG/DL — HIGH (ref 70–99)
GLUCOSE SERPL-MCNC: 229 MG/DL — HIGH (ref 70–99)
HCT VFR BLD CALC: 20.5 % — CRITICAL LOW (ref 39–50)
HCT VFR BLD CALC: 23.5 % — LOW (ref 39–50)
HGB BLD-MCNC: 6.9 G/DL — CRITICAL LOW (ref 13–17)
HGB BLD-MCNC: 8.1 G/DL — LOW (ref 13–17)
IANC: 9.27 K/UL — HIGH (ref 1.8–7.4)
IMM GRANULOCYTES NFR BLD AUTO: 1 % — HIGH (ref 0–0.9)
INR BLD: 1.21 RATIO — HIGH (ref 0.85–1.18)
LYMPHOCYTES # BLD AUTO: 0.6 K/UL — LOW (ref 1–3.3)
LYMPHOCYTES # BLD AUTO: 5.5 % — LOW (ref 13–44)
MAGNESIUM SERPL-MCNC: 2.4 MG/DL — SIGNIFICANT CHANGE UP (ref 1.6–2.6)
MCHC RBC-ENTMCNC: 20.4 PG — LOW (ref 27–34)
MCHC RBC-ENTMCNC: 21.1 PG — LOW (ref 27–34)
MCHC RBC-ENTMCNC: 33.7 GM/DL — SIGNIFICANT CHANGE UP (ref 32–36)
MCHC RBC-ENTMCNC: 34.5 GM/DL — SIGNIFICANT CHANGE UP (ref 32–36)
MCV RBC AUTO: 60.5 FL — LOW (ref 80–100)
MCV RBC AUTO: 61.4 FL — LOW (ref 80–100)
MONOCYTES # BLD AUTO: 0.55 K/UL — SIGNIFICANT CHANGE UP (ref 0–0.9)
MONOCYTES NFR BLD AUTO: 5.1 % — SIGNIFICANT CHANGE UP (ref 2–14)
NEUTROPHILS # BLD AUTO: 9.27 K/UL — HIGH (ref 1.8–7.4)
NEUTROPHILS NFR BLD AUTO: 85.2 % — HIGH (ref 43–77)
NRBC # BLD: 0 /100 WBCS — SIGNIFICANT CHANGE UP (ref 0–0)
NRBC # BLD: 0 /100 WBCS — SIGNIFICANT CHANGE UP (ref 0–0)
NRBC # FLD: 0.02 K/UL — HIGH (ref 0–0)
NRBC # FLD: 0.03 K/UL — HIGH (ref 0–0)
PHOSPHATE SERPL-MCNC: 5.5 MG/DL — HIGH (ref 2.5–4.5)
PLATELET # BLD AUTO: 269 K/UL — SIGNIFICANT CHANGE UP (ref 150–400)
PLATELET # BLD AUTO: 286 K/UL — SIGNIFICANT CHANGE UP (ref 150–400)
POTASSIUM SERPL-MCNC: 4 MMOL/L — SIGNIFICANT CHANGE UP (ref 3.5–5.3)
POTASSIUM SERPL-SCNC: 4 MMOL/L — SIGNIFICANT CHANGE UP (ref 3.5–5.3)
PROT SERPL-MCNC: 5.8 G/DL — LOW (ref 6–8.3)
PROTHROM AB SERPL-ACNC: 13.5 SEC — HIGH (ref 9.5–13)
RBC # BLD: 3.39 M/UL — LOW (ref 4.2–5.8)
RBC # BLD: 3.83 M/UL — LOW (ref 4.2–5.8)
RBC # FLD: 18.9 % — HIGH (ref 10.3–14.5)
RBC # FLD: 19.6 % — HIGH (ref 10.3–14.5)
SODIUM SERPL-SCNC: 136 MMOL/L — SIGNIFICANT CHANGE UP (ref 135–145)
SPECIMEN SOURCE: SIGNIFICANT CHANGE UP
SPECIMEN SOURCE: SIGNIFICANT CHANGE UP
WBC # BLD: 10.87 K/UL — HIGH (ref 3.8–10.5)
WBC # BLD: 9.46 K/UL — SIGNIFICANT CHANGE UP (ref 3.8–10.5)
WBC # FLD AUTO: 10.87 K/UL — HIGH (ref 3.8–10.5)
WBC # FLD AUTO: 9.46 K/UL — SIGNIFICANT CHANGE UP (ref 3.8–10.5)

## 2024-05-16 PROCEDURE — 76604 US EXAM CHEST: CPT | Mod: 26

## 2024-05-16 PROCEDURE — 93308 TTE F-UP OR LMTD: CPT | Mod: 26

## 2024-05-16 PROCEDURE — 99291 CRITICAL CARE FIRST HOUR: CPT | Mod: GC

## 2024-05-16 PROCEDURE — 99232 SBSQ HOSP IP/OBS MODERATE 35: CPT | Mod: GC

## 2024-05-16 PROCEDURE — 99222 1ST HOSP IP/OBS MODERATE 55: CPT | Mod: GC

## 2024-05-16 RX ORDER — ONDANSETRON 8 MG/1
4 TABLET, FILM COATED ORAL EVERY 8 HOURS
Refills: 0 | Status: DISCONTINUED | OUTPATIENT
Start: 2024-05-16 | End: 2024-05-18

## 2024-05-16 RX ORDER — ACETAMINOPHEN 500 MG
650 TABLET ORAL EVERY 6 HOURS
Refills: 0 | Status: DISCONTINUED | OUTPATIENT
Start: 2024-05-16 | End: 2024-05-21

## 2024-05-16 RX ORDER — CARVEDILOL PHOSPHATE 80 MG/1
12.5 CAPSULE, EXTENDED RELEASE ORAL EVERY 12 HOURS
Refills: 0 | Status: DISCONTINUED | OUTPATIENT
Start: 2024-05-16 | End: 2024-06-01

## 2024-05-16 RX ORDER — NIFEDIPINE 30 MG
60 TABLET, EXTENDED RELEASE 24 HR ORAL DAILY
Refills: 0 | Status: DISCONTINUED | OUTPATIENT
Start: 2024-05-16 | End: 2024-05-16

## 2024-05-16 RX ORDER — ATORVASTATIN CALCIUM 80 MG/1
20 TABLET, FILM COATED ORAL AT BEDTIME
Refills: 0 | Status: DISCONTINUED | OUTPATIENT
Start: 2024-05-16 | End: 2024-06-14

## 2024-05-16 RX ORDER — LANOLIN ALCOHOL/MO/W.PET/CERES
3 CREAM (GRAM) TOPICAL AT BEDTIME
Refills: 0 | Status: DISCONTINUED | OUTPATIENT
Start: 2024-05-16 | End: 2024-06-07

## 2024-05-16 RX ORDER — HUMAN INSULIN 100 [IU]/ML
4 INJECTION, SUSPENSION SUBCUTANEOUS EVERY 6 HOURS
Refills: 0 | Status: DISCONTINUED | OUTPATIENT
Start: 2024-05-16 | End: 2024-05-27

## 2024-05-16 RX ORDER — HEPARIN SODIUM 5000 [USP'U]/ML
900 INJECTION INTRAVENOUS; SUBCUTANEOUS
Qty: 25000 | Refills: 0 | Status: DISCONTINUED | OUTPATIENT
Start: 2024-05-16 | End: 2024-05-17

## 2024-05-16 RX ORDER — IPRATROPIUM/ALBUTEROL SULFATE 18-103MCG
3 AEROSOL WITH ADAPTER (GRAM) INHALATION EVERY 12 HOURS
Refills: 0 | Status: DISCONTINUED | OUTPATIENT
Start: 2024-05-16 | End: 2024-06-01

## 2024-05-16 RX ORDER — HYDRALAZINE HCL 50 MG
100 TABLET ORAL THREE TIMES A DAY
Refills: 0 | Status: DISCONTINUED | OUTPATIENT
Start: 2024-05-16 | End: 2024-06-14

## 2024-05-16 RX ORDER — INSULIN HUMAN 100 [IU]/ML
4 INJECTION, SOLUTION SUBCUTANEOUS EVERY 6 HOURS
Refills: 0 | Status: DISCONTINUED | OUTPATIENT
Start: 2024-05-16 | End: 2024-05-16

## 2024-05-16 RX ORDER — FENTANYL CITRATE 50 UG/ML
100 INJECTION INTRAVENOUS ONCE
Refills: 0 | Status: DISCONTINUED | OUTPATIENT
Start: 2024-05-16 | End: 2024-05-16

## 2024-05-16 RX ORDER — HEPARIN SODIUM 5000 [USP'U]/ML
INJECTION INTRAVENOUS; SUBCUTANEOUS
Qty: 25000 | Refills: 0 | Status: DISCONTINUED | OUTPATIENT
Start: 2024-05-16 | End: 2024-05-16

## 2024-05-16 RX ADMIN — CARVEDILOL PHOSPHATE 12.5 MILLIGRAM(S): 80 CAPSULE, EXTENDED RELEASE ORAL at 07:21

## 2024-05-16 RX ADMIN — CHLORHEXIDINE GLUCONATE 15 MILLILITER(S): 213 SOLUTION TOPICAL at 17:15

## 2024-05-16 RX ADMIN — Medication 4: at 05:28

## 2024-05-16 RX ADMIN — Medication 1 APPLICATION(S): at 05:28

## 2024-05-16 RX ADMIN — Medication 1000 MILLIGRAM(S): at 00:12

## 2024-05-16 RX ADMIN — Medication 650 MILLIGRAM(S): at 14:05

## 2024-05-16 RX ADMIN — SODIUM CHLORIDE 4 MILLILITER(S): 9 INJECTION INTRAMUSCULAR; INTRAVENOUS; SUBCUTANEOUS at 23:02

## 2024-05-16 RX ADMIN — CHLORHEXIDINE GLUCONATE 1 APPLICATION(S): 213 SOLUTION TOPICAL at 05:28

## 2024-05-16 RX ADMIN — Medication 650 MILLIGRAM(S): at 14:04

## 2024-05-16 RX ADMIN — Medication 1 APPLICATION(S): at 17:12

## 2024-05-16 RX ADMIN — SODIUM CHLORIDE 4 MILLILITER(S): 9 INJECTION INTRAMUSCULAR; INTRAVENOUS; SUBCUTANEOUS at 07:50

## 2024-05-16 RX ADMIN — Medication 100 MILLIGRAM(S): at 22:20

## 2024-05-16 RX ADMIN — Medication 650 MILLIGRAM(S): at 14:30

## 2024-05-16 RX ADMIN — HEPARIN SODIUM 900 UNIT(S)/HR: 5000 INJECTION INTRAVENOUS; SUBCUTANEOUS at 14:05

## 2024-05-16 RX ADMIN — Medication 650 MILLIGRAM(S): at 22:20

## 2024-05-16 RX ADMIN — SEVELAMER CARBONATE 800 MILLIGRAM(S): 2400 POWDER, FOR SUSPENSION ORAL at 14:05

## 2024-05-16 RX ADMIN — CHLORHEXIDINE GLUCONATE 15 MILLILITER(S): 213 SOLUTION TOPICAL at 05:28

## 2024-05-16 RX ADMIN — Medication 100 MILLIGRAM(S): at 03:47

## 2024-05-16 RX ADMIN — SEVELAMER CARBONATE 800 MILLIGRAM(S): 2400 POWDER, FOR SUSPENSION ORAL at 05:29

## 2024-05-16 RX ADMIN — SEVELAMER CARBONATE 800 MILLIGRAM(S): 2400 POWDER, FOR SUSPENSION ORAL at 22:20

## 2024-05-16 RX ADMIN — Medication 100 MILLIGRAM(S): at 10:57

## 2024-05-16 RX ADMIN — ATORVASTATIN CALCIUM 20 MILLIGRAM(S): 80 TABLET, FILM COATED ORAL at 22:20

## 2024-05-16 RX ADMIN — FENTANYL CITRATE 100 MICROGRAM(S): 50 INJECTION INTRAVENOUS at 03:17

## 2024-05-16 RX ADMIN — Medication 81 MILLIGRAM(S): at 10:57

## 2024-05-16 RX ADMIN — Medication 650 MILLIGRAM(S): at 05:30

## 2024-05-16 RX ADMIN — Medication 4: at 17:56

## 2024-05-16 RX ADMIN — HEPARIN SODIUM 900 UNIT(S)/HR: 5000 INJECTION INTRAVENOUS; SUBCUTANEOUS at 10:57

## 2024-05-16 RX ADMIN — HEPARIN SODIUM 1100 UNIT(S)/HR: 5000 INJECTION INTRAVENOUS; SUBCUTANEOUS at 18:28

## 2024-05-16 RX ADMIN — Medication 3 MILLILITER(S): at 23:01

## 2024-05-16 NOTE — PROGRESS NOTE ADULT - ASSESSMENT
73M w/ PMH of HTN, ESRD (MWF), IDDM, p/f chest pain c/b hiccups for the last 2 days undergoing ischemic evaluation per cardiology. Pt s/p RRT x 2 for AMS. MICU consulted and accepting for airway monitoring in setting of baclofen toxicity +/- stroke now pending trach.     NEUROLOGY     #Pontine and Cerebellar strokes  - MR from 5/6 showing R pontine and cerebellar infarcts  - unclear timeline but may be inciting event for resp failure  - neurology following  - previously treated with Plavix, heparin, ASA for NSTEMI  - MRA head and neck w/o large vessel occlusion    CARDIOVASCULAR   #Angina  - patient admitted with chest pain and noted to have peaked T waves on admission   - TTE showing EF 72%  - EKG 5/3 demonstrating ST deviation noted on EKG  - Per cardiology, no plan for cath at this time  - s/p DAPT load 5/4  - will c/w ASA 81 mg daily with heparin gtt x 48 hours (now complete)  - repeat TTE (5/4) demonstrating no significant interval changes  - asymptomatic troponin iso ESRD    #HTN  - holding home medication iso hypotension    RESPIRATORY   - intubated due to inability to protect airway  - extubated 5/9 c/b weak cough with desatting improved with deep nasal suction and chest PT   - hypophonic and requiring airway clearance   - hypoxic and poor cough; reintubated on 5/12 for airway protection and hypoxia.  - trach 5/14     GI/NUTRITION   #Gastroporesis   - hold reglan for now   - failed speech and swallow after extubation    /RENAL   #ESRD MWF  - R fem shiley removed 5/2  - placement of IJ shiley 5/3 then removed 5/10 iso positive BCx  - pending fistula study of RUE (failed previous HD session)  - tolerating HD  - post MRI JET HD completed (2 sessions)  - new HD IJ catheter placed 5/13 with HD scheduled 5/13  - s/p cath keli 5/14 now easily flushing      INFECTIOUS DISEASE   #leukocytosis  - noted on repeat labs  - 5 day course of zosyn (5/3 - 5/8)  - now with rising WBC iso fever and positive BCx (B. Cereus; 1/2 with subsequent BCx negative at 24 hours); also with RLQ abdominal pain and CT abd showing R common iliac thrombus.   - started on meropenem and vanc (will need to be dosed by level)  - appreciate ID reccs    ENDOCRINE   #IDDM  - cw ISS    HEMATOLOGY/ONCOLOGY  #Common illiac vein thrombus  - holding heparin gtt for trach today    ETHICS  Full code   73M w/ PMH of HTN, ESRD (MWF), IDDM, p/f chest pain c/b hiccups for the last 2 days undergoing ischemic evaluation per cardiology. Pt s/p RRT x 2 for AMS. MICU consulted and accepting for airway monitoring in setting of baclofen toxicity +/- stroke now pending trach.     NEUROLOGY     #Pontine and Cerebellar strokes  - MR from 5/6 showing R pontine and cerebellar infarcts  - unclear timeline but may be inciting event for resp failure  - neurology following  - previously treated with Plavix, heparin, ASA for NSTEMI  - MRA head and neck w/o large vessel occlusion    CARDIOVASCULAR   #Angina  - patient admitted with chest pain and noted to have peaked T waves on admission   - TTE showing EF 72%  - EKG 5/3 demonstrating ST deviation noted on EKG  - Per cardiology, no plan for cath at this time  - s/p DAPT load 5/4  - will c/w ASA 81 mg daily with heparin gtt x 48 hours (now complete)  - repeat TTE (5/4) demonstrating no significant interval changes  - asymptomatic troponin iso ESRD    #HTN  - holding home medication iso hypotension    RESPIRATORY   - intubated due to inability to protect airway  - extubated 5/9 c/b weak cough with desatting improved with deep nasal suction and chest PT   - hypophonic and requiring airway clearance   - hypoxic and poor cough; reintubated on 5/12 for airway protection and hypoxia.  - trach 5/14     GI/NUTRITION   #Gastroporesis   - hold reglan for now   - failed speech and swallow after extubation  - plan for surgical PEG tube    /RENAL   #ESRD MWF  - R fem shiley removed 5/2  - placement of IJ shiley 5/3 then removed 5/10 iso positive BCx  - pending fistula study of RUE (failed previous HD session)  - tolerating HD  - post MRI JET HD completed (2 sessions)  - new HD IJ catheter placed 5/13 with HD scheduled 5/13  - s/p cath keli 5/14 now easily flushing      INFECTIOUS DISEASE   #leukocytosis  - noted on repeat labs  - 5 day course of zosyn (5/3 - 5/8)  - now with rising WBC iso fever and positive BCx (B. Cereus; 1/2 with subsequent BCx negative at 24 hours); also with RLQ abdominal pain and CT abd showing R common iliac thrombus.   - started on meropenem and vanc (will need to be dosed by level)  - meropenem completed  - vanc through 5/20  - appreciate ID reccs    ENDOCRINE   #IDDM  - cw ISS    HEMATOLOGY/ONCOLOGY  #Common illiac vein thrombus  - restart heparin gtt today; hold at midnight for procedure    ETHICS  Full code

## 2024-05-16 NOTE — PROGRESS NOTE ADULT - ASSESSMENT
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 After addressing the goals of care, given the family’s decision to continue mechanical ventilation, patient will require tracheostomy tube placement. Family showed good understanding of the indications, contraindications, risks and benefits of percutaneous tracheostomy. Based on ventilatory requirements, neck anatomy and comorbidities, a shared decision was taken to proceed with bedside placement    POD#2 Percutaneous tracheostomy Cuffed Portex 8.       Post-tracheostomy care instructions:  -Minimize tension on tracheostomy: Keep tracheostomy elevated on towels to maintain perpendicular to neck and keep enough slack on vent tubing to avoid tension  -Sedate/Restrain patient to ensure does not pull at tracheostomy  -Keep tracheostomy retention collar in place at all times  -Utilize tracheostomy specific in-line suction or red rubber suction tubing through the swivel valve  -First dressing change at 72 hours. If dressings are grossly saturated before that time, reenforce dressings and page pulmonary/critical care fellow  - 2 sutures are to remain in place for 10 days, please do not remove before then. Sutures may be removed by the primary service. If feel there are issues with the sutures, please contact MICU/Pulmonary fellow  -First tracheostomy change to happen in 3 weeks. If patient still admitted at that time please page pulmonary service to arrange exchange      -If issues with bleeding call pulmonary/critical care fellow on call  -If tracheostomy becomes dislodged prior to initial tracheostomy exchange do NOT attempt to replace the tracheostomy. Call MICU or Anesthesia immediately to intubate the patient orally with an ETT and page pulmonary/critical care fellow on call

## 2024-05-16 NOTE — PROGRESS NOTE ADULT - ATTENDING COMMENTS
Agree with above. Patient with persistent respiratory failure requiring mechanical ventilation and airway, unable to wean from vent. s/p bedside portex 8 cuffed placement yesterday. POD#2 today. No active bleeding noted. Site looks well. Post tracheostomy care instructions as above. IP team to follow for tracheostomy management. Continue to wean from ventilator as tolerated. Vent management per ICU.
1. Acute hypoxemic respiratory failure. Pt unable to maintain airway and requiring more  supplemental 02 . Pt electively intubated,     Start Zosyn for probable aspiration pneumonia. Check ABG on current AC vent settings.    2.Neuro: Change in mental status most likely from Baclofen toxicity. Pt with multiple fasciculations in arms and legs.                 Check EEG r/o seizure. Low suspicion.                 Will perform LP  3. Renal.  ESRD on HD. Shunt not working. Received HD late last night. Remove femoral HD catheter. Replace tomorrow when will receive next HD.               Ultrasound of shunt.  4.DM. Insulin sliding scale.  5. DVT: prophylaxis: SQ heparin  6. GOC: Full code.
73 M with ESRD on HD, DM here with hiccups c/b AMS, concern for baclofen toxicity with acute hypoxemic respiratory failure requiring intubation, now with concern fo GPDs/ seizures, extubated and then reintubated on 5/12 for acute hypoxemic respiratory failure   MRI with acute L nat and cerebellum ischemic infarct    suspect stroke playing a role in initial presentation and weak cough  recurrent aspirations leading to reintubation, thick secretions on urgent bronchoscopy done yesterday    # acute hypoxemic respiratory failure  # ESRD on HD  # baclofen toxicity  # acute ischemic stroke  # aspiration pneumonia  - c/w full vent support for now  - monitoring off AEDs as per neuro reccs, currently sedated for intubation  - HD as per renal, will eventually need fistulogram; plan to do shiley today and then do HD  - had one blood culture with b cereus but other bottle was negative so suspect it was a contaminant, will treat gnr in sputum as aspiration pneumonia though, c/w abx, f/u ID reccs. f/u repeat sputum culture from yesterday    ongoing GOC; palliative consult, family deciding on whether to proceed with trach/peg which would be indicated at this point given recurrent aspiration, stroke
73 M with ESRD on HD, DM here with hiccups c/b AMS, concern for baclofen toxicity with acute hypoxemic respiratory failure requiring intubation, now with concern fo GPDs/ seizures  MRI with acute L nat and cerebellum ischemic infarct    Patient awake and following commands  PS trials better today    # acute hypoxemic respiratory failure  # ESRD on HD  # baclofen toxicity  # acute ischemic stroke  # aspiration pneumonia  - c/w full vent support, PS trials as tolerated, may extubate today  - waking up  - monitoring off AEDs as per neuro reccs  - HD as per renal, will eventually need fistulogram  - off abx
73 M with ESRD on HD, DM here with hiccups c/b AMS, concern for baclofen toxicity with acute hypoxemic respiratory failure requiring intubation, now with concern fo GPDs/ seizures, extubated and then reintubated on 5/12 for acute hypoxemic respiratory failure, s/p trach 5/14     MRI with acute L nat and cerebellum ischemic infarct    # acute hypoxemic respiratory failure  # ESRD on HD  # baclofen toxicity  # acute ischemic stroke  # aspiration pneumonia  - c/w full vent support for now  - monitoring off AEDs as per neuro reccs, wean sedation  - HD as per renal, will eventually need fistulogram; patient medically optimized for fistulogram, including ID and cardiac perspective (see notes); f/u vascular for timing of fistulogram  - abx as per ID  - feeding tube as per surgery
73M w/ PMH of HTN, ESRD (MWF), IDDM, p/f chest pain c/b hiccups for the last 2 days undergoing ischemic evaluation per cardiology. Pt s/p RRT x 2 for AMS, s/p LEANNE for airway protection in setting of baclofen toxicity, airway protection. + signs/symptoms of dysautonomia, hypo/hyperthermia, new pressor requirement. d/w toxicology, unlikely baclofen withdrawal given few doses that pt received and no hx of chronic use. EEG with features of generalized periodic discharges, start keprra for seizure ppx. ? seizure risk related to any head trauma given CT head findings. f/u with neuro. S/p LP negaive. pending MRI brain; CRRT started due to hypotension and intolerance to HD overnight. Has MILTON jarrett 5/3/24. + pending fistula study. monitor off antibiotics for now. Continue ventilatory, pressor support.
Agree with above. Patient with persistent respiratory failure requiring mechanical ventilation and airway, unable to wean from vent. s/p bedside portex 8 cuffed placement yesterday. POD#1 today. No active bleeding noted. Site looks well. Post tracheostomy care instructions as above. IP team to follow for tracheostomy management. Continue to wean from ventilator as tolerated. Vent management per ICU.
73 M with ESRD on HD, DM here with hiccups c/b AMS, concern for baclofen toxicity with acute hypoxemic respiratory failure requiring intubation, now with concern fo GPDs/ seizures    Slightly more awake. Eyes open, moving b/l feet to command. Squeezes L hand on command. Not moving R had.    MRI with acute L talya and L cerebellun infarcts overnight.    # acute hypoxemic respiratory failure  # ESRD on HD  # baclofen toxicity  # aspiration pneumonia  # Acute L talya/cerebellum infarcts  - c/w full vent support, spontaneous breaths on PSV 10/5 but with frequent periods of apnea  - Monitor off sedation  - c/w AEDs as per neuro, check keppra level  - c/w ASA  - f/u neuro recs  - HD as per renal, will eventually need fistulogram  - finish abx for aspiration pneumonia  - PT eval    discussed with family at bedside    Kody Rivas MD  Pulmonary & Critical Care
73 M with ESRD on HD, DM here with hiccups c/b AMS, concern for baclofen toxicity with acute hypoxemic respiratory failure requiring intubation, now with concern fo GPDs/ seizures  MRI with acute L nat and cerebellum ischemic infarct    Patient awake and following commands    # acute hypoxemic respiratory failure  # ESRD on HD  # baclofen toxicity  # acute ischemic stroke  # aspiration pneumonia  - c/w full vent support, PS trials as tolerated  - waking up  - monitoring off AEDs as per neuro reccs  - HD as per renal, will eventually need fistulogram  - off abx    Critically ill patient requiring frequent bedside visits with therapy changes.
73 M with ESRD on HD, DM here with hiccups c/b AMS, concern for baclofen toxicity with acute hypoxemic respiratory failure requiring intubation, now with concern fo GPDs/ seizures  MRI with acute L nat and cerebellum ischemic infarct    extubated a few days ago to HFNC, requirements improving now  suspect stroke playing a role in initial presentation and weak cough    # acute hypoxemic respiratory failure  # ESRD on HD  # baclofen toxicity  # acute ischemic stroke  # aspiration pneumonia  - c/w HFNC for now, encourage chest PT  - monitoring off AEDs as per neuro reccs, very alert  - HD as per renal, will eventually need fistulogram  - had one blood culture with b cereus but other bottle was negative so suspect it was a contaminant, will treat gnr in sputum as aspiration pneumonia though, c/w abx, f/u ID reccs
73 M with ESRD on HD, DM here with hiccups c/b AMS, concern for baclofen toxicity with acute hypoxemic respiratory failure requiring intubation, now with concern fo GPDs/ seizures  MRI with acute L nat and cerebellum ischemic infarct    requiring HFNC 100/50 overnight with plug, slowly improving  suspect stroke playing a role in initial presentation and weak cough    # acute hypoxemic respiratory failure  # ESRD on HD  # baclofen toxicity  # acute ischemic stroke  # aspiration pneumonia  - c/w HFNC for now, encourage chest PT  - monitoring off AEDs as per neuro reccs, very alert  - HD as per renal, will eventually need fistulogram  - will monitor off abx, send sputum culture, f/u bcx
73 M with ESRD on HD, DM here with hiccups c/b AMS, concern for baclofen toxicity with acute hypoxemic respiratory failure requiring intubation, now with concern fo GPDs/ seizures, extubated and then reintubated on 5/12 for acute hypoxemic respiratory failure, s/p trach 5/14     MRI with acute L nat and cerebellum ischemic infarct      # acute hypoxemic respiratory failure  # ESRD on HD  # baclofen toxicity  # acute ischemic stroke  # aspiration pneumonia  - c/w full vent support for now  - monitoring off AEDs as per neuro reccs, currently sedated, will try to wake up  - HD as per renal, will eventually need fistulogram; HD today as per renal, will discuss with ID re: clearance for fistulogram, else patient is medically optimized after HD today for fistulogram  - abx as per ID       ongoing GOC; PEG pending
73M w/ PMH of HTN, ESRD (MWF), IDDM, p/f chest pain c/b hiccups for the last 2 days undergoing ischemic evaluation per cardiology. Pt s/p RRT x 2 for AMS, s/p LEANNE for airway protection in setting of baclofen toxicity, airway protection. + signs/symptoms of dysautonomia, hypo/hyperthermia, new pressor requirement. d/w toxicology, unlikely baclofen withdrawal given few doses that pt received and no hx of chronic use. EEG with features of generalized periodic discharges, start keprra for seizure ppx. Per family, pt had a fall 15 years ago and resulted in head trauma without deficits. s/p LP negaive. pending MRI brain; continue HD for concern of baclofen toxicity. Has MILTON jarrett 5/3/24. + pending fistula study. On zosyn empirically given fever onset. wean sedation as tolerated to assess mental status.
1. Acute hypoxemic respiratory failure. Pt unable to maintain airway and requiring more  supplemental 02 . Pt electively intubated,     Start Zosyn for probable aspiration pneumonia. Check ABG on current AC vent settings.    2.Neuro: Change in mental status most likely from Baclofen toxicity. Pt with multiple fasciculations in arms and legs.                 Check EEG r/o seizure. Low suspicion.                 Will perform LP  3. Renal.  ESRD on HD. Shunt not working. Received HD late last night. Remove femoral HD catheter. Replace tomorrow when will receive next HD.               Ultrasound of shunt.  4.DM. Insulin sliding scale.  5. DVT: prophylaxis: SQ heparin  6. GOC: Full code.
73 M with ESRD on HD, DM here with hiccups c/b AMS, concern for baclofen toxicity with acute hypoxemic respiratory failure requiring intubation, now with concern fo GPDs/ seizures  MRI with acute L nat and cerebellum ischemic infarct    extubated a few days ago to HFNC, requirements improving now  suspect stroke playing a role in initial presentation and weak cough    # acute hypoxemic respiratory failure  # ESRD on HD  # baclofen toxicity  # acute ischemic stroke  # aspiration pneumonia  - c/w HFNC for now, encourage chest PT/chest vest  - monitoring off AEDs as per neuro reccs, very alert  - HD as per renal, will eventually need fistulogram; plan to reinsert HD catheter tomorrow for HD, medical mgmt today for hyperK  - had one blood culture with b cereus but other bottle was negative so suspect it was a contaminant, will treat gnr in sputum as aspiration pneumonia though, c/w abx, f/u ID reccs. CT abd/pelvis.
73 M with ESRD on HD, DM here with hiccups c/b AMS, concern for baclofen toxicity with acute hypoxemic respiratory failure requiring intubation, now with concern fo GPDs/ seizures    Patient not fully awake although opening eyes    # acute hypoxemic respiratory failure  # ESRD on HD  # baclofen toxicity  # aspiration pneumonia  - c/w full vent support  - off sedation, not fully awake, will get MRI  - c/w AEDs as per neuro  - HD as per renal, will eventually need fistulogram  - finish abx for aspiration pneumonia    discussed with family at bedside
73 M with ESRD on HD, DM here with hiccups c/b AMS, concern for baclofen toxicity with acute hypoxemic respiratory failure requiring intubation, now with concern fo GPDs/ seizures, extubated and then reintubated on 5/12 for acute hypoxemic respiratory failure   MRI with acute L nat and cerebellum ischemic infarct    suspect stroke playing a role in initial presentation and weak cough  recurrent aspirations leading to reintubation, thick secretions on urgent bronchoscopy done during reintubation    # acute hypoxemic respiratory failure  # ESRD on HD  # baclofen toxicity  # acute ischemic stroke  # aspiration pneumonia  - c/w full vent support for now  - monitoring off AEDs as per neuro reccs, currently sedated for intubation and trach today  - HD as per renal, will eventually need fistulogram; s/p shiley yesterday, may need to be pulled back a little given concern with flow with catheter, no need for HD today though  - had one blood culture with b cereus but other bottle was negative so suspect it was a contaminant, will treat gnr in sputum as aspiration pneumonia though, c/w abx, f/u ID reccs. f/u repeat sputum culture and ID reccs    ongoing GOC; family amenable to trach and PEG, plan for trach today

## 2024-05-16 NOTE — CONSULT NOTE ADULT - ATTENDING COMMENTS
Unable to be dialyzed via AVF  emergency shiley placed last night  will need fistulogram next week once medically optimized
Patient with esrd, concern for baclofen overdose, worked up for acs s/p dapt, and hep gtt for dvt. Patient failed extubation and is s/p trach, ct scan showing no window for peg for gi or ir.  plan  medical and cardiac optimization  plan for peg possible lap assisted  d/w surgeon of week    I have personally interviewed and examined this patient, reviewed pertinent labs and imaging, and discussed the case with colleagues, residents, and physician assistants on B Team rounds.    The active care issues are:  1. peg placement    The Acute Care Surgery (B Team) Attending Group Practice:  Dr. Molly Ross, Dr. Te Fuller, Dr. Ady Lanza, Dr. Jalen Wheatley,     urgent issues - spectra [phone]  nonurgent issues - [phone]  patient appointments or afterhours - [phone]

## 2024-05-16 NOTE — DISCHARGE NOTE PROVIDER - HOSPITAL COURSE
72 YO M with PMHx of ESRD on HD MWF, HTN, and IDDM2 A1C 6.9 who presented chest pain complicated by hiccups x 2 days and admitted for NSTEMI concern to medicine.     While on medicine, patient continued on medical management for NSTEMI. Trops elevated, but CKMB negative. ECHO normal. ECG with ST deviation. Case discussed with cardiology and loaded with DAPT and started on heparin GTT x 48 hours for medical management. No CATH recommended at this time. Patient started on baclofen for hiccup control, however course complicated by AMS with concern for baclofen toxicity. RRT called x 2 and patient ultimately intubated for airway monitoring and transferred to MICU.     While in MICU, patient found with DA and cerebellar stroke via MRI BRAIN and sedation with mental status improved and extubated. For baclofen toxicity, patient received HD x 1, however then noted with right radiocephalic fistula stenosis requiring R IJ SHILEY placement in MICU on 5/3 and CRRT. VA AVF (5/2) with Right radiocephalic arteriovenous fistula has no hemodynamically significant stenosis present at the anastomotic site ( cm/sec, EDV 49 cm/sec). The usable segment is partially thrombosed with minimal patency.CRRT converted back to iHD on MWF. Course complicated by fevers and aspiration event requiring reintubation . Course further complicated by B Cereus Bacteremia (5/14) and R IJ SHILEY line removed and replaced on 5/13, R common iliac DVT, and prolonged vent time requiring tracheostomy with IP on 5/14. Patient ultimately transferred to RCU on 5/16.    74 YO M with PMHx of ESRD on HD MWF, HTN, and 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 Baclofen started and course complicated by AMS second to baclofen toxicity requiring intubation and MICU transfer. Course further complicated by LEFT pontine and cerebellar stroke, R AVF stenosis requiring fistuloplasty, aspiration PNA, and B Cereus bacteremia and RLE DVT. s/p trach and transferred to RCU 5/16 and course complicated by intermittent fever spikes with likely aspiration citrobacter PNA, AOCD, and GIB vs Fe stools. s/p fistulogram 6/4 and able to wean off vent to TC ATC.     NEUROLOGY  # AMS   - MRI HEAD (5/6) with punctate foci in the left talya and left cerebellum with concern for acute infarct.   - MRA HEAD and NECK (5/6) with no large vessel occlusion or stenosis.  - Continue on Lipitor for medical management     PSYCH   # Depression   - CO started, however formal discussion and evaluation with no true SI     CARDIOVASCULAR  # CP with NSTEMI < demand ischemia with HTN   - Admitted for CP and HTN with peaked T WAVES and ECG with ST deviation on admission   - Troponins mildly elevated on admission with negative CKMB   - TTE (4/30) with EF 72, normal LVRVSF, and no regional wall motion abnormalities   - No need to pursue cath at this time     # Septic vs vasoplegic shock  # Hx of HTN   - Continue on coreg 25 (increased 6/1), hydralazine 100 TID and Norvasc 10mg (increased 5/31)     RESPIRATORY  # Respiratory failure   - AMS noted with baclofen toxicity requiring intubation   - Attempted extubation in MICU however course complicated by aspiration and prolonged course and now s/p tracheostomy with IP on 5/14  - Continued on vent 12/500/5/30 and weaned to TC ATC   - Able to tolerate PMV during the day with good phonation   - Copious secretions this secretions but hold HTS as strong cough and continue on albuterol and NS 0.9%  - 6/12 and 6/13 improved secretions     GI  # Dysphagia   - s/p surgical PEG 5/17   - Failed green dye on 6/1 and continue on tube feeds via PEG and RPT SLP (6/11) again with e/o gross aspiration via green dye test    # GIB  - Noted with several episodes of black stool with drop in Hgb (5/26 overnight) requiring PRBC with appropriate response  - Case discussed with GI, questionable anemia from AOCD with ESRD and black stool likely from iron, and no plan for scope at this time  - Continue on PPI     RENAL  # ESRD on HD MWF with R BCF  # Fistula stenosis   - s/p RUE Fistuloplasty on 6/4   - s/p Successfully HD via RUE AVF on 6/5  - s/p R IJ Shiley removed 6/6  - Continue on HD MWF per Renal     # Hyperphosphatemia   - Renvela 1600 however phos corrects well with HD   - Monitor off Renvela     INFECTIOUS DISEASE  # Citrobacter aspiration vs trachitis concern   - SCx (5/30) with citrobacter and completed Zosyn empirically (5/18 - 5/28)  - Thick secretions without fever and RPT SCx negative, however will empirically tx with cipro   - secretions improved 6/12    # B Cereus Bacteremia   - Case discussed with ID and s/p Vancomycin through 5/21 x 10 day course.     HEME  # AOCD with ESRD   - Continue on EPO 10K and Ferrous supplement  - s/p PRBC on 5/16, 5/26 and 6/7    VASCULAR   # RLE DVT   - CT AP (5/12) with nonocclusive RIGHT common iliac vein deep venous thrombosis and case discussed with IR with no plans for IVC filter.   - RLE duplex 5/28 with no evidence of DVT  - Initially started on a Heparin GTT with course complicated by questionable GIB given dark tarry stools, however more likely second to iron tabs.   - Discontinued heparin GTT 6/6 and transitioned to Eliquis    DISPO - PMR recc ACUTE    On 6/13/24 this case was reviewed with Dr. Holloway, the patient is medically stable and optimized for discharge to acute rehab.   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 Baclofen started and course complicated by AMS second to baclofen toxicity requiring intubation and MICU transfer. Course further complicated by LEFT pontine and cerebellar stroke, R AVF stenosis requiring fistuloplasty, aspiration PNA, and B Cereus bacteremia and RLE DVT. s/p trach and transferred to RCU 5/16 and course complicated by intermittent fever spikes with likely aspiration citrobacter PNA, AOCD, and GIB vs Fe stools. s/p fistulogram 6/4 and able to wean off vent to TC ATC.     NEUROLOGY  # AMS   - MRI HEAD (5/6) with punctate foci in the left talya and left cerebellum with concern for acute infarct.   - MRA HEAD and NECK (5/6) with no large vessel occlusion or stenosis.  - Continue on Lipitor for medical management     PSYCH   # Depression   - CO started, however formal discussion and evaluation with no true SI     CARDIOVASCULAR  # CP with NSTEMI < demand ischemia with HTN   - Admitted for CP and HTN with peaked T WAVES and ECG with ST deviation on admission   - Troponins mildly elevated on admission with negative CKMB   - TTE (4/30) with EF 72, normal LVRVSF, and no regional wall motion abnormalities   - No need to pursue cath at this time     # Septic vs vasoplegic shock  # Hx of HTN   - Continue on coreg 25 (increased 6/1), hydralazine 100 TID and Norvasc 10mg (increased 5/31)     RESPIRATORY  # Respiratory failure   - AMS noted with baclofen toxicity requiring intubation   - Attempted extubation in MICU however course complicated by aspiration and prolonged course and now s/p tracheostomy with IP on 5/14  - Continued on vent 12/500/5/30 and weaned to TC ATC   - Able to tolerate PMV during the day with good phonation   - Copious secretions this secretions but hold HTS as strong cough and continue on albuterol and NS 0.9%  - 6/12 and 6/13 improved secretions     GI  # Dysphagia   - s/p surgical PEG 5/17   - Failed green dye on 6/1 and continue on tube feeds via PEG and RPT SLP (6/11) again with e/o gross aspiration via green dye test    # GIB  - Noted with several episodes of black stool with drop in Hgb (5/26 overnight) requiring PRBC with appropriate response  - Case discussed with GI, questionable anemia from AOCD with ESRD and black stool likely from iron, and no plan for scope at this time  - Continue on PPI     RENAL  # ESRD on HD MWF with R BCF  # Fistula stenosis   - s/p RUE Fistuloplasty on 6/4   - s/p Successfully HD via RUE AVF on 6/5  - s/p R IJ Shiley removed 6/6  - Continue on HD MWF per Renal     # Hyperphosphatemia   - Renvela 1600 however phos corrects well with HD   - Monitor off Renvela     INFECTIOUS DISEASE  # Citrobacter aspiration vs trachitis concern   - SCx (5/30) with citrobacter and completed Zosyn empirically (5/18 - 5/28)  - Thick secretions without fever and RPT SCx negative, however will empirically tx with cipro   - secretions improved 6/12    # B Cereus Bacteremia   - Case discussed with ID and s/p Vancomycin through 5/21 x 10 day course.     HEME  # AOCD with ESRD   - Continue on EPO 10K and Ferrous supplement  - s/p PRBC on 5/16, 5/26 and 6/7    VASCULAR   # RLE DVT   - CT AP (5/12) with nonocclusive RIGHT common iliac vein deep venous thrombosis and case discussed with IR with no plans for IVC filter.   - RLE duplex 5/28 with no evidence of DVT  - Initially started on a Heparin GTT with course complicated by questionable GIB given dark tarry stools, however more likely second to iron tabs.   - Discontinued heparin GTT 6/6 and transitioned to Eliquis    DISPO - PMR ACUTE REHAB    On 6/14/24 this case was reviewed with Dr. Holloway, the patient is medically stable and optimized for discharge to acute rehab.

## 2024-05-16 NOTE — PROGRESS NOTE ADULT - ASSESSMENT
73M PMHx HTN, ESRD, IDDM p/w hiccups and started on baclofen with concern for AMS overnight and desaturation, ?cheye nascimenot respirations. Labs with numerous metabolic derangements. CTH with bifrontal encephalomalacia, likley traumatic.   WBC inc significantly, now intubated. Exam limited as on propofol, also on pressors.   s/p LP for meningitis concerns however appears bland - not on antibiotics  EEG with GPDs, no seizures  MR brain with punctuate L pontine and L cerebellar infarcts  MRA H/N with mild basilar fenestration, otherwise neg  mental status improving post extubation but now reintubated for recurrent aspiration   s/p trach, remains sedated    AMS of unclear etiology - ? baclofen toxicity, no evidence of seizures. Metabolic encephalopathy- would not expect AMS to this degree from small strokes  L pontine and L cerebellar strokes - embolic appearing  Intractable hiccups  hypoxic resp failure  ESRD on HD  B cereus bacteremia, likely contaminant  Nonocclusive R common iliac DVT    MRI, MRA reviewed, as above  cont aspirin 81mg  TTE reviewed, no need to repeat for now  Keppra started for GPDs, no improvement in mental status - now better off keppra  continue to monitor off Keppra, avoid baclofen and reglan  s/p trach  PEG pending  s/p trach  HD per nephro  f/u remainder of CSF - negative  s/p Zosyn, Meropenem for pna  on hep gtt for DVT  Fistulogram planned per vasc sx

## 2024-05-16 NOTE — PROGRESS NOTE ADULT - ASSESSMENT
73M w/ PMHx hypertension, ESRD on HD via R radiocephalic fistula (MWF), DM, HTN, p/w hiccups and peaked T waves, admitted to MICU for c/f baclofen toxicity. Patient received 1x HD on admission. R femoral shiley removed 5/2, had 2 RRT for AMS and failed HD via AVF 5/3. S/p emergent R IJ shiley placement 5/3, started CRRT which is now transitioned back to iHD. Vascular planning RUE fistulogram when medically optimized. Extubated to HFNC then reintubated 5/12 for c/f aspiration w/ hypoxic resp failure. S/p trach 5/14.    Recommendations:   - Hep gtt started for nonocclusive R common iliac DVT - to be held for B Team G tube  - Fistulogram planning w/ Dr. Lockett pending fluoro OR availability  - Med/cards/ID optimization, risk stratification documented - patient listed for MICU downgrade  - Continue HD via replaced R IJ shiley in MICU  - Global care per primary     Vascular Surgery  [id number]

## 2024-05-16 NOTE — PROGRESS NOTE ADULT - SUBJECTIVE AND OBJECTIVE BOX
Neurology Progress Note    S: Patient seen and examined. Off sedation    Medications: MEDICATIONS  (STANDING):  albuterol/ipratropium for Nebulization 3 milliLiter(s) Nebulizer every 12 hours  aspirin  chewable 81 milliGRAM(s) Oral daily  atorvastatin 20 milliGRAM(s) Oral at bedtime  carvedilol 12.5 milliGRAM(s) Oral every 12 hours  chlorhexidine 0.12% Liquid 15 milliLiter(s) Oral Mucosa every 12 hours  chlorhexidine 2% Cloths 1 Application(s) Topical <User Schedule>  dextrose 10% Bolus 125 milliLiter(s) IV Bolus once  dextrose 5%. 1000 milliLiter(s) (50 mL/Hr) IV Continuous <Continuous>  dextrose 5%. 1000 milliLiter(s) (100 mL/Hr) IV Continuous <Continuous>  dextrose 50% Injectable 12.5 Gram(s) IV Push once  dextrose 50% Injectable 25 Gram(s) IV Push once  epoetin reilly (EPOGEN) Injectable 68956 Unit(s) IV Push <User Schedule>  glucagon  Injectable 1 milliGRAM(s) IntraMuscular once  heparin  Infusion. 900 Unit(s)/Hr (9 mL/Hr) IV Continuous <Continuous>  hydrALAZINE 100 milliGRAM(s) Oral three times a day  insulin lispro (ADMELOG) corrective regimen sliding scale   SubCutaneous every 6 hours  insulin NPH human recombinant 4 Unit(s) SubCutaneous every 6 hours  petrolatum Ophthalmic Ointment 1 Application(s) Both EYES two times a day  sevelamer carbonate Powder 800 milliGRAM(s) Oral every 8 hours  sodium bicarbonate 650 milliGRAM(s) Oral every 8 hours  sodium chloride 3%  Inhalation 4 milliLiter(s) Inhalation every 12 hours    MEDICATIONS  (PRN):  acetaminophen     Tablet .. 650 milliGRAM(s) Oral every 6 hours PRN Temp greater or equal to 38C (100.4F), Mild Pain (1 - 3)  aluminum hydroxide/magnesium hydroxide/simethicone Suspension 30 milliLiter(s) Oral every 4 hours PRN Dyspepsia  dextrose Oral Gel 15 Gram(s) Oral once PRN Blood Glucose LESS THAN 70 milliGRAM(s)/deciliter  melatonin 3 milliGRAM(s) Oral at bedtime PRN Insomnia  ondansetron Injectable 4 milliGRAM(s) IV Push every 8 hours PRN Nausea and/or Vomiting  sodium chloride 0.9% lock flush 10 milliLiter(s) IV Push every 1 hour PRN Pre/post blood products, medications, blood draw, and to maintain line patency       Vitals:  Vital Signs Last 24 Hrs  T(C): 37.1 (16 May 2024 16:00), Max: 37.9 (16 May 2024 04:00)  T(F): 98.7 (16 May 2024 16:00), Max: 100.2 (16 May 2024 04:00)  HR: 68 (16 May 2024 19:50) (60 - 86)  BP: 122/57 (16 May 2024 16:00) (114/80 - 199/60)  BP(mean): 81 (16 May 2024 10:00) (77 - 159)  RR: 14 (16 May 2024 16:00) (12 - 14)  SpO2: 98% (16 May 2024 19:50) (94% - 100%)    Parameters below as of 16 May 2024 16:00  Patient On (Oxygen Delivery Method): ventilator  O2 Flow (L/min): 40          General Exam:   General Appearance: + trach  Head: Normocephalic, atraumatic and no dysmorphic features  Ear, Nose, and Throat: Moist mucous membranes  CVS: S1S2+  Resp: mechanical  Abd: soft NTND  Extremities: No edema, no cyanosis  Skin: No bruises, no rashes     Neurological Exam:  Mental Status: Opens to voice, tracks minimally, follows simple commands.    Cranial Nerves: pupils 1-2mm, no facial asymmetry  Motor: slight inc tone throughout, uppers > lowers antigravity   Sensation:  intact      I personally reviewed the below data/images/labs:    LABS:                          8.1    9.46  )-----------( 269      ( 16 May 2024 16:55 )             23.5     05-16    136  |  95<L>  |  52<H>  ----------------------------<  229<H>  4.0   |  25  |  4.55<H>    Ca    8.2<L>      16 May 2024 01:00  Phos  5.5     05-16  Mg     2.40     05-16    TPro  5.8<L>  /  Alb  2.3<L>  /  TBili  <0.2  /  DBili  x   /  AST  11  /  ALT  8   /  AlkPhos  136<H>  05-16    LIVER FUNCTIONS - ( 16 May 2024 01:00 )  Alb: 2.3 g/dL / Pro: 5.8 g/dL / ALK PHOS: 136 U/L / ALT: 8 U/L / AST: 11 U/L / GGT: x           PT/INR - ( 16 May 2024 01:00 )   PT: 13.5 sec;   INR: 1.21 ratio         PTT - ( 16 May 2024 16:55 )  PTT:31.3 sec  Urinalysis Basic - ( 16 May 2024 01:00 )    Color: x / Appearance: x / SG: x / pH: x  Gluc: 229 mg/dL / Ketone: x  / Bili: x / Urobili: x   Blood: x / Protein: x / Nitrite: x   Leuk Esterase: x / RBC: x / WBC x   Sq Epi: x / Non Sq Epi: x / Bacteria: x              CT Head No Cont:  (01 May 2024 18:11)  < from: CT Head No Cont (05.01.24 @ 18:11) >    ACC: [id number] EXAM:  CT BRAIN   ORDERED BY: SHELBY MOREL     PROCEDURE DATE:  05/01/2024          INTERPRETATION:  CT OF THE HEAD WITHOUT CONTRAST    CLINICAL INDICATION: Lethargy.    TECHNIQUE: Volumetric CT acquisition was performed through the brain and   reviewed using brain and bone window technique.      COMPARISON: CT head from 1/17/2024    FINDINGS:    The ventricular and sulcal size and configuration is age appropriate.     There is no acute loss of gray-white differentiation. Stable bilateral   inferior frontal encephalomalacia and gliosis likely related to remote   trauma.    There is no evidence of mass effect, midline shift, acute intracranial   hemorrhage, or extra-axial collections.     The calvarium is intact. The paranasalsinuses are clear.The mastoid air   cells are predominantly clear. The orbits are unremarkable.      IMPRESSION:  No acute intracranial hemorrhage or acute territorial infarction. Chronic   findings as above.    --- End of Report ---          GILDARDO KHAN; Resident Radiologist  This document has been electronically signed.  LADARIUS DENNY MD; Attending Radiologist  This document has been electronically signed. May  1 2024  7:54PM    < end of copied text >      EEG Classification / Summary:  Abnormal EEG in the awake, drowsy states.   -Generalized sharp waves with triphasic morphology, initially appearing as frequent GPDs at 1-1.5 Hz, decreasing in prevalence later in recording.  -Variable moderate to mild diffuse slowing.  -No electrographic seizures are captured.     Clinical Impression:  -Generalized sharp waves with triphasic morphology can be seen in toxic-metabolic encephalopathies and indicate some risk of generalized seizures, especially when at higher frequencies than in this study. These decrease in prevalence over the course of recording.  -Variable moderate to mild diffuse cerebral dysfunction is nonspecific in etiology.   -No seizures x2 days of recording.    m< from: MR Head w/wo IV Cont (05.06.24 @ 18:10) >    ACC: [id number] EXAM:  MR BRAIN WAW IC   ORDERED BY: DOLORES QUICK     PROCEDURE DATE:  05/06/2024          INTERPRETATION:  CLINICAL INDICATION: Altered mental status.    TECHNIQUE: Multi-planar, multi-sequence magnetic resonance imaging of the   brain was performed with and without intravenous contrast.    CONTRAST: Post-contrast MR images were obtained following infusion of 5   mL of Gadavist    COMPARISON: No prior studies are available for comparison    FINDINGS:    VENTRICLES AND SULCI:  Normal.  INTRA-AXIAL: Punctate foci of restricted diffusion in the left talya and   left cerebellum with associated T2 prolongation consistent with acute   infarcts. No acute intracranial hemorrhage. Encephalomalacia/gliosis   noted in the inferior frontal lobes. No abnormal enhancement. There are   patchy and confluent foci of hyperintense T2 signal within the   subcortical and periventricular white matter which are nonspecific but   likely related to chronic microvascular ischemic disease.  EXTRA-AXIAL:  No mass or collection.  VISUALIZED SINUSES: Mild polypoid mucosal thickening. Fluid noted in the   nasopharynx.  VISUALIZED MASTOIDS:  Clear.  CALVARIUM:  Normal.  CAROTID FLOW VOIDS: Normal.  MISCELLANEOUS:  None.    IMPRESSION: PUNCTATE FOCI OF RESTRICTED DIFFUSION IN THE LEFT TALYA AND   LEFT CEREBELLUM WITH ASSOCIATED T2 PROLONGATION CONSISTENT WITH ACUTE   INFARCTS. NO ACUTE INTRACRANIAL HEMORRHAGE. NO AREA OF ABNORMAL   ENHANCEMENT.    < end of copied text >    m< from: MR Angio Head No Cont (05.09.24 @ 15:58) >    ACC: [id number] EXAM:  MR ANGIO NECK WAW IC   ORDERED BY: OMAR BUTTON     ACC: [id number] EXAM:  MR ANGIO BRAIN   ORDERED BY: OMAR BUTTON     PROCEDURE DATE:  05/09/2024          INTERPRETATION:  .    CLINICAL INFORMATION: Stroke workup. Talya/cerebellar stroke.    TECHNIQUE: MRA images through the neck and [place] were obtained   using a combination of 2-D and 3-D time-of-flight acquisition. Post   contrast MR angiography of the neck was also performed. The data was then   reformatted intoa volumetric data set and reviewed as rotational MIP   images. 5 cc's of IV Gadavist was administered without immediate   complication. 2.5 cc's was discarded.    COMPARISON: Prior head CT exam dated 5/1/2024.    FINDINGS:    MRA Neck: There is a standard anatomic configuration to the aortic arch.    The origins of the great vessels appear unremarkable. The bilateral   common carotid arteries and carotid bifurcations appear unremarkable.    The bilateral cervical internal carotid arteries are within normal limits.    The origins of the bilateral vertebral arteries are normal. The bilateral   cervical vertebral arteries are normal in course and caliber.    MRA [place]: The bilateral intracranial internal carotid,   anterior, and middle cerebral arteries appear unremarkable.    The anterior and bilateral posterior communicating arteries are notable.    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 Otherwise, the   bilateral intradural vertebral arteries, vertebrobasilar junction,   basilar artery, and basilar tip appear unremarkable as well as the   bilateral posterior cerebral arteries.    IMPRESSION: No large vessel occlusion or stenosis.    < end of copied text >

## 2024-05-16 NOTE — CHART NOTE - NSCHARTNOTEFT_GEN_A_CORE
: Nila Gibbs PA-C    INDICATION: AHRF    PROCEDURE:  [X] LIMITED ECHO  [X] LIMITED CHEST  [ ] LIMITED RETROPERITONEAL  [ ] LIMITED ABDOMINAL  [ ] LIMITED DVT  [ ] NEEDLE GUIDANCE VASCULAR  [ ] NEEDLE GUIDANCE THORACENTESIS  [ ] NEEDLE GUIDANCE PARACENTESIS  [ ] NEEDLE GUIDANCE PERICARDIOCENTESIS  [ ] OTHER    FINDINGS:    Grossly normal appearing cardiac systolic function. LV > RV. No pericardial effusion. IVC 1.87 cm. A-line predominate to anterior lung fields with scattered B-lines to lung bases.     INTERPRETATION:    Grossly normal appearing cardiac systolic function and aeration pattern to lungs. : Nila Gibbs PA-C    INDICATION: AHRF    PROCEDURE:  [X] LIMITED ECHO  [X] LIMITED CHEST  [ ] LIMITED RETROPERITONEAL  [ ] LIMITED ABDOMINAL  [ ] LIMITED DVT  [ ] NEEDLE GUIDANCE VASCULAR  [ ] NEEDLE GUIDANCE THORACENTESIS  [ ] NEEDLE GUIDANCE PARACENTESIS  [ ] NEEDLE GUIDANCE PERICARDIOCENTESIS  [ ] OTHER    FINDINGS:    Grossly normal appearing cardiac systolic function. LV > RV. No pericardial effusion. IVC 1.87 cm. A-line predominate to anterior lung fields with scattered B-lines to lung bases.     INTERPRETATION:    Grossly normal appearing cardiac systolic function and aeration pattern to lungs.    Attending Attestation:  I was present during the key portions of the procedure and immediately available during the entire procedure.  Bonnie Bhakta MD  Attending  Pulmonary & Critical Care Medicine

## 2024-05-16 NOTE — PROGRESS NOTE ADULT - SUBJECTIVE AND OBJECTIVE BOX
Cardiovascular Disease Progress Note  DATE OF SERVICE: 24 @ 09:09    Overnight events: No acute events overnight.    The patient remains on mechanical ventilation via trach.        Objective Findings:  T(C): 37.9 (24 @ 04:00), Max: 38.2 (05-15-24 @ 20:00)  HR: 80 (24 @ 08:00) (60 - 86)  BP: 175/60 (24 @ 08:00) (114/80 - 199/60)  RR: 12 (24 @ 08:00) (12 - 13)  SpO2: 100% (24 @ 08:00) (94% - 100%)  Wt(kg): --  Daily     Daily Weight in k.5 (16 May 2024 05:00)      Physical Exam:  Gen: NAD; Patient resting comfortably  HEENT:   Normocephalic/ atraumatic  CV: RRR, normal S1 + S2, no m/r/g  Lungs:  Normal respiratory effort; clear to auscultation bilaterally  Abd: soft, non-tender; bowel sounds present  Ext: No edema; warm and well perfused    Telemetry: Sinus    Laboratory Data:                        6.9    10.87 )-----------( 286      ( 16 May 2024 01:00 )             20.5         136  |  95<L>  |  52<H>  ----------------------------<  229<H>  4.0   |  25  |  4.55<H>    Ca    8.2<L>      16 May 2024 01:00  Phos  5.5       Mg     2.40         TPro  5.8<L>  /  Alb  2.3<L>  /  TBili  <0.2  /  DBili  x   /  AST  11  /  ALT  8   /  AlkPhos  136<H>      PT/INR - ( 16 May 2024 01:00 )   PT: 13.5 sec;   INR: 1.21 ratio         PTT - ( 16 May 2024 01:00 )  PTT:28.4 sec          Inpatient Medications:  MEDICATIONS  (STANDING):  aspirin  chewable 81 milliGRAM(s) Oral daily  carvedilol 12.5 milliGRAM(s) Oral every 12 hours  chlorhexidine 0.12% Liquid 15 milliLiter(s) Oral Mucosa every 12 hours  chlorhexidine 2% Cloths 1 Application(s) Topical <User Schedule>  dextrose 10% Bolus 125 milliLiter(s) IV Bolus once  dextrose 5%. 1000 milliLiter(s) (50 mL/Hr) IV Continuous <Continuous>  dextrose 5%. 1000 milliLiter(s) (100 mL/Hr) IV Continuous <Continuous>  dextrose 50% Injectable 12.5 Gram(s) IV Push once  dextrose 50% Injectable 25 Gram(s) IV Push once  epoetin reilly (EPOGEN) Injectable 16480 Unit(s) IV Push <User Schedule>  glucagon  Injectable 1 milliGRAM(s) IntraMuscular once  hydrALAZINE 100 milliGRAM(s) Oral three times a day  insulin lispro (ADMELOG) corrective regimen sliding scale   SubCutaneous every 6 hours  NIFEdipine XL 60 milliGRAM(s) Oral daily  petrolatum Ophthalmic Ointment 1 Application(s) Both EYES two times a day  sevelamer carbonate Powder 800 milliGRAM(s) Oral every 8 hours  sodium bicarbonate 650 milliGRAM(s) Oral every 8 hours  sodium chloride 3%  Inhalation 4 milliLiter(s) Inhalation every 12 hours      Assessment: 73 year old man with HTN, HLD, T2DM on insulin, and ESRD on HD presents with supply demand ischemia and angina.    Plan of Care:    #Type II myocardial infarction-  Secondary to distributive shock earlier on admission.   The repeat echo shows no new wall motion abnormality.   I would not pursue cath at this time given recurrent aspiration and chronic respiratory failure s/p trach .   The patient is optimized from a cardiac standpoint to proceed with fistulogram.        #Sinus bradycardia-  No evidence of AV block on admission EKG or telemetry.  No indication for pacing at this time.     #HTN-  BP labile.  Monitor trends.     #ESRD-  RRT in the MICU.         Patient critically ill in the MICU.             Over 55 of critical care time minutes spent on total encounter; more than 50% of the visit was spent counseling and/or coordinating care by the attending physician.      Geovani Bravo MD [facility]  Cardiovascular Disease  [phone] Cardiovascular Disease Progress Note  DATE OF SERVICE: 24 @ 09:09    Overnight events: No acute events overnight.    The patient remains on mechanical ventilation via trach.        Objective Findings:  T(C): 37.9 (24 @ 04:00), Max: 38.2 (05-15-24 @ 20:00)  HR: 80 (24 @ 08:00) (60 - 86)  BP: 175/60 (24 @ 08:00) (114/80 - 199/60)  RR: 12 (24 @ 08:00) (12 - 13)  SpO2: 100% (24 @ 08:00) (94% - 100%)  Wt(kg): --  Daily     Daily Weight in k.5 (16 May 2024 05:00)      Physical Exam:  Gen: NAD; Patient resting comfortably  HEENT:   Normocephalic/ atraumatic  CV: RRR, normal S1 + S2, no m/r/g  Lungs:  Normal respiratory effort; clear to auscultation bilaterally  Abd: soft, non-tender; bowel sounds present  Ext: No edema; warm and well perfused    Telemetry: Sinus    Laboratory Data:                        6.9    10.87 )-----------( 286      ( 16 May 2024 01:00 )             20.5         136  |  95<L>  |  52<H>  ----------------------------<  229<H>  4.0   |  25  |  4.55<H>    Ca    8.2<L>      16 May 2024 01:00  Phos  5.5       Mg     2.40         TPro  5.8<L>  /  Alb  2.3<L>  /  TBili  <0.2  /  DBili  x   /  AST  11  /  ALT  8   /  AlkPhos  136<H>      PT/INR - ( 16 May 2024 01:00 )   PT: 13.5 sec;   INR: 1.21 ratio         PTT - ( 16 May 2024 01:00 )  PTT:28.4 sec          Inpatient Medications:  MEDICATIONS  (STANDING):  aspirin  chewable 81 milliGRAM(s) Oral daily  carvedilol 12.5 milliGRAM(s) Oral every 12 hours  chlorhexidine 0.12% Liquid 15 milliLiter(s) Oral Mucosa every 12 hours  chlorhexidine 2% Cloths 1 Application(s) Topical <User Schedule>  dextrose 10% Bolus 125 milliLiter(s) IV Bolus once  dextrose 5%. 1000 milliLiter(s) (50 mL/Hr) IV Continuous <Continuous>  dextrose 5%. 1000 milliLiter(s) (100 mL/Hr) IV Continuous <Continuous>  dextrose 50% Injectable 12.5 Gram(s) IV Push once  dextrose 50% Injectable 25 Gram(s) IV Push once  epoetin reilly (EPOGEN) Injectable 29887 Unit(s) IV Push <User Schedule>  glucagon  Injectable 1 milliGRAM(s) IntraMuscular once  hydrALAZINE 100 milliGRAM(s) Oral three times a day  insulin lispro (ADMELOG) corrective regimen sliding scale   SubCutaneous every 6 hours  NIFEdipine XL 60 milliGRAM(s) Oral daily  petrolatum Ophthalmic Ointment 1 Application(s) Both EYES two times a day  sevelamer carbonate Powder 800 milliGRAM(s) Oral every 8 hours  sodium bicarbonate 650 milliGRAM(s) Oral every 8 hours  sodium chloride 3%  Inhalation 4 milliLiter(s) Inhalation every 12 hours      Assessment: 73 year old man with HTN, HLD, T2DM on insulin, and ESRD on HD presents with supply demand ischemia and angina.    Plan of Care:    #Type II myocardial infarction-  Secondary to distributive shock earlier on admission.   The repeat echo shows no new wall motion abnormality.   I would not pursue cath at this time given recurrent aspiration and chronic respiratory failure s/p trach .   The patient is optimized from a cardiac standpoint to proceed with fistulogram and PEG placement.        #Sinus bradycardia-  No evidence of AV block on admission EKG or telemetry.  No indication for pacing at this time.     #HTN-  BP labile.  Monitor trends.     #ESRD-  RRT in the MICU.         Patient critically ill in the MICU.             Over 55 of critical care time minutes spent on total encounter; more than 50% of the visit was spent counseling and/or coordinating care by the attending physician.      Geovani Bravo MD [facility]  Cardiovascular Disease  [phone]

## 2024-05-16 NOTE — PROGRESS NOTE ADULT - CRITICAL CARE SERVICES
35
35
Conjuntivae and eyelids appear normal , Sclerae : White without injection , no scalp lesions
35
32
35
40
40
35
35
65
35
35
40
35
40

## 2024-05-16 NOTE — PROGRESS NOTE ADULT - SUBJECTIVE AND OBJECTIVE BOX
Patient is a 73y old  Male who presents with a chief complaint of Unstable angina, abn EKG (16 May 2024 19:26)      SUBJECTIVE / OVERNIGHT EVENTS: ptn transferred out of MICU today, on  trache vented, follows commands, weaned off pressors and sedation successfully , VSS, FS elevated, on Heparin drip    MEDICATIONS  (STANDING):  albuterol/ipratropium for Nebulization 3 milliLiter(s) Nebulizer every 12 hours  aspirin  chewable 81 milliGRAM(s) Oral daily  atorvastatin 20 milliGRAM(s) Oral at bedtime  carvedilol 12.5 milliGRAM(s) Oral every 12 hours  chlorhexidine 0.12% Liquid 15 milliLiter(s) Oral Mucosa every 12 hours  chlorhexidine 2% Cloths 1 Application(s) Topical <User Schedule>  dextrose 10% Bolus 125 milliLiter(s) IV Bolus once  dextrose 5%. 1000 milliLiter(s) (50 mL/Hr) IV Continuous <Continuous>  dextrose 5%. 1000 milliLiter(s) (100 mL/Hr) IV Continuous <Continuous>  dextrose 50% Injectable 12.5 Gram(s) IV Push once  dextrose 50% Injectable 25 Gram(s) IV Push once  epoetin reilly (EPOGEN) Injectable 70930 Unit(s) IV Push <User Schedule>  glucagon  Injectable 1 milliGRAM(s) IntraMuscular once  heparin  Infusion. 900 Unit(s)/Hr (9 mL/Hr) IV Continuous <Continuous>  hydrALAZINE 100 milliGRAM(s) Oral three times a day  insulin lispro (ADMELOG) corrective regimen sliding scale   SubCutaneous every 6 hours  insulin NPH human recombinant 4 Unit(s) SubCutaneous every 6 hours  petrolatum Ophthalmic Ointment 1 Application(s) Both EYES two times a day  sevelamer carbonate Powder 800 milliGRAM(s) Oral every 8 hours  sodium bicarbonate 650 milliGRAM(s) Oral every 8 hours  sodium chloride 3%  Inhalation 4 milliLiter(s) Inhalation every 12 hours    MEDICATIONS  (PRN):  acetaminophen     Tablet .. 650 milliGRAM(s) Oral every 6 hours PRN Temp greater or equal to 38C (100.4F), Mild Pain (1 - 3)  aluminum hydroxide/magnesium hydroxide/simethicone Suspension 30 milliLiter(s) Oral every 4 hours PRN Dyspepsia  dextrose Oral Gel 15 Gram(s) Oral once PRN Blood Glucose LESS THAN 70 milliGRAM(s)/deciliter  melatonin 3 milliGRAM(s) Oral at bedtime PRN Insomnia  ondansetron Injectable 4 milliGRAM(s) IV Push every 8 hours PRN Nausea and/or Vomiting  sodium chloride 0.9% lock flush 10 milliLiter(s) IV Push every 1 hour PRN Pre/post blood products, medications, blood draw, and to maintain line patency      Vital Signs Last 24 Hrs  T(F): 98.7 (05-16-24 @ 16:00), Max: 100.2 (05-16-24 @ 04:00)  HR: 68 (05-16-24 @ 19:50) (60 - 86)  BP: 122/57 (05-16-24 @ 16:00) (114/80 - 199/60)  RR: 14 (05-16-24 @ 16:00) (12 - 14)  SpO2: 98% (05-16-24 @ 19:50) (94% - 100%)  Telemetry:   CAPILLARY BLOOD GLUCOSE      POCT Blood Glucose.: 208 mg/dL (16 May 2024 17:09)  POCT Blood Glucose.: 212 mg/dL (16 May 2024 13:54)  POCT Blood Glucose.: 193 mg/dL (16 May 2024 10:51)  POCT Blood Glucose.: 212 mg/dL (16 May 2024 05:26)  POCT Blood Glucose.: 280 mg/dL (15 May 2024 23:10)    I&O's Summary    15 May 2024 07:01  -  16 May 2024 07:00  --------------------------------------------------------  IN: 891.2 mL / OUT: 1400 mL / NET: -508.8 mL    16 May 2024 07:01  -  16 May 2024 20:46  --------------------------------------------------------  IN: 18 mL / OUT: 0 mL / NET: 18 mL        PHYSICAL EXAM:  GENERAL: NAD, well-developed  HEAD:  Atraumatic, Normocephalic  EYES: EOMI, PERRLA, conjunctiva and sclera clear  NECK: Supple, No JVD  CHEST/LUNG: Clear to auscultation bilaterally; No wheeze  HEART: Regular rate and rhythm; No murmurs, rubs, or gallops  ABDOMEN: Soft, Nontender, Nondistended; Bowel sounds present  EXTREMITIES:  2+ Peripheral Pulses, No clubbing, cyanosis, or edema  PSYCH: AAOx3  NEUROLOGY: non-focal  SKIN: No rashes or lesions    LABS:                        8.1    9.46  )-----------( 269      ( 16 May 2024 16:55 )             23.5     05-16    136  |  95<L>  |  52<H>  ----------------------------<  229<H>  4.0   |  25  |  4.55<H>    Ca    8.2<L>      16 May 2024 01:00  Phos  5.5     05-16  Mg     2.40     05-16    TPro  5.8<L>  /  Alb  2.3<L>  /  TBili  <0.2  /  DBili  x   /  AST  11  /  ALT  8   /  AlkPhos  136<H>  05-16    PT/INR - ( 16 May 2024 01:00 )   PT: 13.5 sec;   INR: 1.21 ratio         PTT - ( 16 May 2024 16:55 )  PTT:31.3 sec      Urinalysis Basic - ( 16 May 2024 01:00 )    Color: x / Appearance: x / SG: x / pH: x  Gluc: 229 mg/dL / Ketone: x  / Bili: x / Urobili: x   Blood: x / Protein: x / Nitrite: x   Leuk Esterase: x / RBC: x / WBC x   Sq Epi: x / Non Sq Epi: x / Bacteria: x        RADIOLOGY & ADDITIONAL TESTS:    Imaging Personally Reviewed:    Consultant(s) Notes Reviewed:      Care Discussed with Consultants/Other Providers:

## 2024-05-16 NOTE — DISCHARGE NOTE PROVIDER - NSDCMRMEDTOKEN_GEN_ALL_CORE_FT
alcohol swabs: Apply topically to affected area 4 times a day  atorvastatin 20 mg oral tablet: 1 tab(s) orally once a day  calamine topical lotion: 1 Apply topically to affected area 3 times a day As needed Itching  Coreg 25 mg oral tablet: 0.5 tab(s) orally 2 times a day 12.5 mg two times a day  glucometer (per patient&#x27;s insurance): Test blood sugars four times a day. Dispense #1 glucometer.  glucose tablets: Follow instructions on bottle when sugar is low.  hydrALAZINE 100 mg oral tablet: 1 tab(s) orally 3 times a day  Insulin Pen Needles, 4mm: 1 application subcutaneously 4 times a day. ** Use with insulin pen **  lancets: 1 application subcutaneously 4 times a day  Lyumjev KwikPen 100 units/mL injectable solution: 2 unit(s) injectable 3 times a day (before meals)  NIFEdipine 60 mg oral tablet, extended release: 1 tab(s) orally 2 times a day  Retacrit 10,000 units/mL injectable solution: 1 units/kg intravenously Monday, Wednesday, and Friday   test strips (per patient&#x27;s insurance): 1 application subcutaneously 4 times a day. ** Compatible with patient&#x27;s glucometer **  Tresiba 100 units/mL subcutaneous solution: 6 unit(s) subcutaneous once a day (at bedtime)   acetaminophen 160 mg/5 mL oral suspension: 20.31 milliliter(s) orally every 6 hours as needed for Temp greater or equal to 38C (100.4F), Moderate Pain (4 - 6)  albuterol 2.5 mg/3 mL (0.083%) inhalation solution: 3 milliliter(s) inhaled every 6 hours  amLODIPine 10 mg oral tablet: 1 tab(s) orally once a day  apixaban 5 mg oral tablet: 1 tab(s) orally every 12 hours  aspirin 81 mg oral tablet, chewable: 1 tab(s) orally once a day  atorvastatin 20 mg oral tablet: 1 tab(s) orally once a day  carvedilol 25 mg oral tablet: 1 tab(s) orally every 12 hours  epoetin reilly: 10,000 unit(s) intravenous 3 times a week 10,000u IVP M-W-F with HD  ferrous sulfate 300 mg/5 mL (60 mg/5 mL elemental iron) oral liquid: 5 milliliter(s) orally once a day  hydrALAZINE 100 mg oral tablet: 1 tab(s) orally 3 times a day  insulin isophane (NPH) 100 units/mL human recombinant subcutaneous suspension: 4 unit(s) subcutaneous every 6 hours  insulin lispro 100 units/mL injectable solution: injectable 4 times a day Accuchecks every 6 h  2 Unit(s) if Glucose 151 - 200  4 Unit(s) if Glucose 201 - 250  6 Unit(s) if Glucose 251 - 300  8 Unit(s) if Glucose 301 - 350  10 Unit(s) if Glucose 351 - 400  12 Unit(s) if Glucose Greater Than 400  lidocaine 4% topical film: Apply topically to affected area once a day  polyethylene glycol 3350 oral powder for reconstitution: 17 gram(s) orally once a day  senna leaf extract oral tablet: 2 tab(s) orally once a day (at bedtime)

## 2024-05-16 NOTE — PROGRESS NOTE ADULT - ASSESSMENT
73-year-old male past medical history hypertension, ESRD on dialysis Monday Wednesday Friday, diabetes insulin-dependent presents with hiccups patient endorses persistent hiccups for the last 2 days.   found to have peaked T waves, a new finding. denied dizziness, N/V, denies CP, palps, abd pain  Upon admission seen by CArd, renal and GI  found to have a distended GB prob 2/2 gastroparesis, was started on Reglan  has received 4 doses of baclofen since 4/30 2/2 hiccups, last dose on 5/1 at 5 am  had received Haldol for agitation at 11 pm on 4/30, AMS observed in pm of 5/1  AMS ongoing, RRT x 2 called  now transferred to MICU for encephalopathy requiring  airway protection on 5/2,  intubated for airway protection, was on  propofol and pressors. now off  toxicology consulted upon dx of encephalopathy, not impressed AMS 2/2 Haldol nor baclofen . AMS was 2/2 acute CVA   HD was not done timely until femoral shiley was placed 2/2 clotted RUE AVF. now removed and RIJ shiley placed on 5/3  has been nonverbal since pm 5/1.     MR brain 5/6 c/w L pontine and L cerebellar acute infarcts, no hemorrhage . as per neuro: embolic in nature.   encephalopathy probably 2/2 acute CVA, now follows commands appropriately off sedation.   no SZ focus on EEG,   off CRRT,  HD resumed as per renal.   remains intubated, now trached  off keppra  AC resumed, on Heparin drip  completed 5 days of empiric ZOSYN on 5/7, shortly after became septic again after an extubation trial. bacteremia w B. cereus, on Vanc through 5/20 as per ID    s/p trache placement by pulm, IR unable to find a window for G-J tube.   HD via IJ.    leukocytosis resolved  EKG changes on 5/3 c/w NSTEMI loaded w DAPT , was  on Heparin drip x 48 hrs. rpt TTE is unchanged  ID, Neuro , renal, cardiology following.  clotted RUE AVF. fistulogram when medically stable. vascular following   HD via RIght IJ Shiley.   NGT in place in stomach.   LP done, no sign of meningitis.   NEUROLOGY     - CTH without acute findings   - LP done, no acute findings  - MR brain w acute infarcts: L talya and L cerebellum, nonhemorrhagic  - no Sz focus on EEG    CARDIOVASCULAR   - ptn never had CP. just peaked T waves. was awaiting ischemic study w nucl stress test  - TTE showing EF 72%, rpt unchanged  - EKG changes on 5/3, loaded w DAPT now on Heparin drip    GI  - on NGT feeds   - Gastroparesis   - G tube placement in IR not successful. consider surgical consult.      RENAL   -  HD as per renal  - via R IJ shiley  -  fistula study of RUE  as per VAscular      INFECTIOUS DISEASE     completed a course of Zosyn, then became septic, bacteremic a B. cereus, completed 3 days of Meropenem, now on vanc through 5/20    ENDOCRINE   - DM  - cw ISS    GOC:  Full code

## 2024-05-16 NOTE — PROGRESS NOTE ADULT - SUBJECTIVE AND OBJECTIVE BOX
Vascular Surgery Progress Note     Subjective:  Patient seen and examined on AM rounds. Patient is awake and alert, following commands. S/p trach, on vent.      Vital Signs:  Vital Signs Last 24 Hrs  T(C): 37.9 (16 May 2024 04:00), Max: 38.2 (15 May 2024 20:00)  T(F): 100.2 (16 May 2024 04:00), Max: 100.8 (15 May 2024 20:00)  HR: 69 (16 May 2024 09:00) (60 - 86)  BP: 137/59 (16 May 2024 09:00) (114/80 - 199/60)  BP(mean): 81 (16 May 2024 09:00) (77 - 159)  RR: 12 (16 May 2024 09:00) (12 - 13)  SpO2: 100% (16 May 2024 09:00) (94% - 100%)    Parameters below as of 16 May 2024 08:00  Patient On (Oxygen Delivery Method): ventilator    O2 Concentration (%): 40    CAPILLARY BLOOD GLUCOSE      POCT Blood Glucose.: 193 mg/dL (16 May 2024 10:51)  POCT Blood Glucose.: 212 mg/dL (16 May 2024 05:26)  POCT Blood Glucose.: 280 mg/dL (15 May 2024 23:10)  POCT Blood Glucose.: 245 mg/dL (15 May 2024 17:56)  POCT Blood Glucose.: 159 mg/dL (15 May 2024 11:18)      I&O's Detail    15 May 2024 07:01  -  16 May 2024 07:00  --------------------------------------------------------  IN:    Modular Fluid: 1 mL    Nepro: 135 mL    Norepinephrine: 30 mL    Other (mL): 400 mL    PRBCs (Packed Red Blood Cells): 280 mL    Propofol: 45.2 mL  Total IN: 891.2 mL    OUT:    Other (mL): 1400 mL  Total OUT: 1400 mL    Total NET: -508.8 mL            Physical Exam:  General: awake and alert  HEENT: Normocephalic atraumatic  Respiratory: s/p trach, on vent  Cardio: regular rate  Vascular: extremities are warm and well perfused, palpable b/l radial pulses, R AVF w/ palpable thrill, R IJ shiley in place        Labs:    05-16    136  |  95<L>  |  52<H>  ----------------------------<  229<H>  4.0   |  25  |  4.55<H>    Ca    8.2<L>      16 May 2024 01:00  Phos  5.5     05-16  Mg     2.40     05-16    TPro  5.8<L>  /  Alb  2.3<L>  /  TBili  <0.2  /  DBili  x   /  AST  11  /  ALT  8   /  AlkPhos  136<H>  05-16    LIVER FUNCTIONS - ( 16 May 2024 01:00 )  Alb: 2.3 g/dL / Pro: 5.8 g/dL / ALK PHOS: 136 U/L / ALT: 8 U/L / AST: 11 U/L / GGT: x                                 6.9    10.87 )-----------( 286      ( 16 May 2024 01:00 )             20.5     PT/INR - ( 16 May 2024 01:00 )   PT: 13.5 sec;   INR: 1.21 ratio         PTT - ( 16 May 2024 01:00 )  PTT:28.4 sec

## 2024-05-16 NOTE — PROGRESS NOTE ADULT - ASSESSMENT
72 y/o M PMhx HTN, ESRD (on HD M/W/F), DM who presented with hiccups x 2 days and chest pain found to have peaked T waves. Hospital course c/b AMS s/p RRT on 5/1 and 5/2. Vascular consulted 2/2 RUE AVF access unable to be used s/p R femoral shiley placed for emergent HD. Transferred to MICU and intubated 5/2 for airway protection.     sepsis  fevers noted since 5/10  blood cx 5/10 w/ Bacillus cereus in 1/4 bottles  repeat blood cultures 5/11- NGTD   s/p meropenem x 5 days, completed 5/14    AMS, CVA  TTE- no evidence of valvular disease  s/p LP 5/2, cell count not consistent w/ bacterial meningitis, CSF PCR- negative  MRI- Punctate foci of restricted diffusion in the left talya and left cerebellum with associated T2 prolongation consistent with acute infarcts  s/p trach 5/14  noted tentative plan for PEG vs lap assisted vs open G tube 5/17    DVT  CT- Nonocclusive R common iliac vein deep venous thrombosis.    febrile 5/15, unclear etiology  differential would include post-op fever 2/2 recent trach, DVT, new infection  no clinical findings on exam to suggest new infection    Recommendations  complete 10 day course of vanc for B cereus bacteremia w/ last day 5/20  random vanc level today pending  - dose by level post-HD    Steven Torres M.D.  [facility] [place], Division of Infectious Diseases  [phone]  After 5pm on weekdays and all day on weekends - please call [phone]

## 2024-05-16 NOTE — CHART NOTE - NSCHARTNOTEFT_GEN_A_CORE
MICU Transfer Note  ---------------------------    Transfer from: MICU  Transfer to:  (  ) Medicine    (  ) Telemetry    ( X ) RCU    (  ) Palliative    (  ) Stroke Unit    (  ) _______________  Accepting Physician:      MICU COURSE    73M w/ PMH of HTN, ESRD (MWF), IDDM, p/f chest pain c/b hiccups for 2 days prior to coming into hospital found to have NSTEMI, Pt s/p RRT x 2 for AMS. MICU consulted and accepted for airway monitoring in setting of baclofen toxicity +/- talya stroke. Mental status cleared and was extubated but then aspirated several days later and was reintubated for protection now s/p trach on 5/14 pending PEG placement and fistulogram. Course c/b fevers and B cereus positive blood culture (1/4) now completing vanc course, and also common iliac DVT on heparin ggt.     To-Do:    [ ] f/u fistologram with vascular  [ ] PEG tube with surgery possibly 5/17  [ ] stop heparin ggt and NPO midnight if going for PEG  [ ] dose vanc by level (f/u value in AM) through 5/20      OBJECTIVE --  Vital Signs Last 24 Hrs  T(C): 37.9 (16 May 2024 04:00), Max: 38.2 (15 May 2024 20:00)  T(F): 100.2 (16 May 2024 04:00), Max: 100.8 (15 May 2024 20:00)  HR: 69 (16 May 2024 09:00) (60 - 86)  BP: 137/59 (16 May 2024 09:00) (114/80 - 199/60)  BP(mean): 81 (16 May 2024 09:00) (77 - 159)  RR: 12 (16 May 2024 09:00) (12 - 13)  SpO2: 100% (16 May 2024 09:00) (94% - 100%)    Parameters below as of 16 May 2024 08:00  Patient On (Oxygen Delivery Method): ventilator    O2 Concentration (%): 40    I&O's Summary    15 May 2024 07:01  -  16 May 2024 07:00  --------------------------------------------------------  IN: 891.2 mL / OUT: 1400 mL / NET: -508.8 mL        MEDICATIONS  (STANDING):  aspirin  chewable 81 milliGRAM(s) Oral daily  carvedilol 12.5 milliGRAM(s) Oral every 12 hours  chlorhexidine 0.12% Liquid 15 milliLiter(s) Oral Mucosa every 12 hours  chlorhexidine 2% Cloths 1 Application(s) Topical <User Schedule>  dextrose 10% Bolus 125 milliLiter(s) IV Bolus once  dextrose 5%. 1000 milliLiter(s) (100 mL/Hr) IV Continuous <Continuous>  dextrose 5%. 1000 milliLiter(s) (50 mL/Hr) IV Continuous <Continuous>  dextrose 50% Injectable 12.5 Gram(s) IV Push once  dextrose 50% Injectable 25 Gram(s) IV Push once  epoetin reilly (EPOGEN) Injectable 72692 Unit(s) IV Push <User Schedule>  glucagon  Injectable 1 milliGRAM(s) IntraMuscular once  heparin  Infusion.  Unit(s)/Hr (10 mL/Hr) IV Continuous <Continuous>  hydrALAZINE 100 milliGRAM(s) Oral three times a day  insulin lispro (ADMELOG) corrective regimen sliding scale   SubCutaneous every 6 hours  insulin regular  human recombinant 4 Unit(s) SubCutaneous every 6 hours  NIFEdipine XL 60 milliGRAM(s) Oral daily  petrolatum Ophthalmic Ointment 1 Application(s) Both EYES two times a day  sevelamer carbonate Powder 800 milliGRAM(s) Oral every 8 hours  sodium bicarbonate 650 milliGRAM(s) Oral every 8 hours  sodium chloride 3%  Inhalation 4 milliLiter(s) Inhalation every 12 hours    MEDICATIONS  (PRN):  dextrose Oral Gel 15 Gram(s) Oral once PRN Blood Glucose LESS THAN 70 milliGRAM(s)/deciliter  sodium chloride 0.9% lock flush 10 milliLiter(s) IV Push every 1 hour PRN Pre/post blood products, medications, blood draw, and to maintain line patency        LABS                                            6.9                   Neurophils% (auto):   85.2   (05-16 @ 01:00):    10.87)-----------(286          Lymphocytes% (auto):  5.5                                           20.5                   Eosinphils% (auto):   2.9      Manual%: Neutrophils x    ; Lymphocytes x    ; Eosinophils x    ; Bands%: x    ; Blasts x                                    136    |  95     |  52                  Calcium: 8.2   / iCa: x      (05-16 @ 01:00)    ----------------------------<  229       Magnesium: 2.40                             4.0     |  25     |  4.55             Phosphorous: 5.5      TPro  5.8    /  Alb  2.3    /  TBili  <0.2   /  DBili  x      /  AST  11     /  ALT  8      /  AlkPhos  136    16 May 2024 01:00    ( 05-16 @ 01:00 )   PT: 13.5 sec;   INR: 1.21 ratio  aPTT: 28.4 sec          ASSESSMENT & PLAN:     73M w/ PMH of HTN, ESRD (MWF), IDDM, p/f chest pain c/b hiccups for the last 2 days undergoing ischemic evaluation per cardiology. Pt s/p RRT x 2 for AMS. MICU consulted and accepting for airway monitoring in setting of baclofen toxicity +/- stroke now pending trach.     NEUROLOGY     #Pontine and Cerebellar strokes  - MR from 5/6 showing R pontine and cerebellar infarcts  - unclear timeline but may be inciting event for resp failure  - neurology following  - previously treated with Plavix, heparin, ASA for NSTEMI  - MRA head and neck w/o large vessel occlusion    CARDIOVASCULAR   #Angina  - patient admitted with chest pain and noted to have peaked T waves on admission   - TTE showing EF 72%  - EKG 5/3 demonstrating ST deviation noted on EKG  - Per cardiology, no plan for cath at this time  - s/p DAPT load 5/4  - will c/w ASA 81 mg daily with heparin gtt x 48 hours (now complete)  - repeat TTE (5/4) demonstrating no significant interval changes  - asymptomatic troponin iso ESRD    #HTN  - holding home medication iso hypotension    RESPIRATORY   - intubated due to inability to protect airway  - extubated 5/9 c/b weak cough with desatting improved with deep nasal suction and chest PT   - hypophonic and requiring airway clearance   - hypoxic and poor cough; reintubated on 5/12 for airway protection and hypoxia.  - trach 5/14     GI/NUTRITION   #Gastroporesis   - hold reglan for now   - failed speech and swallow after extubation    /RENAL   #ESRD MWF  - R fem shiley removed 5/2  - placement of IJ shiley 5/3 then removed 5/10 iso positive BCx  - pending fistula study of RUE (failed previous HD session)  - tolerating HD  - post MRI JET HD completed (2 sessions)  - new HD IJ catheter placed 5/13 with HD scheduled 5/13  - s/p cath keli 5/14 now easily flushing      INFECTIOUS DISEASE   #leukocytosis  - noted on repeat labs  - 5 day course of zosyn (5/3 - 5/8)  - now with rising WBC iso fever and positive BCx (B. Cereus; 1/2 with subsequent BCx negative at 24 hours); also with RLQ abdominal pain and CT abd showing R common iliac thrombus.   - started on meropenem and vanc (will need to be dosed by level)  - appreciate ID reccs    ENDOCRINE   #IDDM  - cw ISS    HEMATOLOGY/ONCOLOGY  #Common illiac vein thrombus  - holding heparin gtt for trach today    ETHICS  Full code MICU Transfer Note  ---------------------------    Transfer from: MICU  Transfer to:  (  ) Medicine    (  ) Telemetry    ( X ) RCU    (  ) Palliative    (  ) Stroke Unit    (  ) _______________  Accepting Physician:      MICU COURSE    73M w/ PMH of HTN, ESRD (MWF), IDDM, p/f chest pain c/b hiccups for 2 days prior to coming into hospital found to have NSTEMI, Pt s/p RRT x 2 for AMS. MICU consulted and accepted for airway monitoring in setting of baclofen toxicity +/- talya stroke. Mental status cleared and was extubated but then aspirated several days later and was reintubated for protection now s/p trach on 5/14 pending PEG placement and fistulogram. Course c/b fevers and B cereus positive blood culture (1/4) now completing vanc course, and also common iliac DVT on heparin ggt.     To-Do:    [ ] f/u fistologram with vascular  [ ] PEG tube with surgery possibly 5/17  [ ] stop heparin ggt at midnight and NPO midnight  [ ] dose vanc by level (f/u value in AM) through 5/20      OBJECTIVE --  Vital Signs Last 24 Hrs  T(C): 37.9 (16 May 2024 04:00), Max: 38.2 (15 May 2024 20:00)  T(F): 100.2 (16 May 2024 04:00), Max: 100.8 (15 May 2024 20:00)  HR: 69 (16 May 2024 09:00) (60 - 86)  BP: 137/59 (16 May 2024 09:00) (114/80 - 199/60)  BP(mean): 81 (16 May 2024 09:00) (77 - 159)  RR: 12 (16 May 2024 09:00) (12 - 13)  SpO2: 100% (16 May 2024 09:00) (94% - 100%)    Parameters below as of 16 May 2024 08:00  Patient On (Oxygen Delivery Method): ventilator    O2 Concentration (%): 40    I&O's Summary    15 May 2024 07:01  -  16 May 2024 07:00  --------------------------------------------------------  IN: 891.2 mL / OUT: 1400 mL / NET: -508.8 mL        MEDICATIONS  (STANDING):  aspirin  chewable 81 milliGRAM(s) Oral daily  carvedilol 12.5 milliGRAM(s) Oral every 12 hours  chlorhexidine 0.12% Liquid 15 milliLiter(s) Oral Mucosa every 12 hours  chlorhexidine 2% Cloths 1 Application(s) Topical <User Schedule>  dextrose 10% Bolus 125 milliLiter(s) IV Bolus once  dextrose 5%. 1000 milliLiter(s) (100 mL/Hr) IV Continuous <Continuous>  dextrose 5%. 1000 milliLiter(s) (50 mL/Hr) IV Continuous <Continuous>  dextrose 50% Injectable 12.5 Gram(s) IV Push once  dextrose 50% Injectable 25 Gram(s) IV Push once  epoetin reilly (EPOGEN) Injectable 52979 Unit(s) IV Push <User Schedule>  glucagon  Injectable 1 milliGRAM(s) IntraMuscular once  heparin  Infusion.  Unit(s)/Hr (10 mL/Hr) IV Continuous <Continuous>  hydrALAZINE 100 milliGRAM(s) Oral three times a day  insulin lispro (ADMELOG) corrective regimen sliding scale   SubCutaneous every 6 hours  insulin regular  human recombinant 4 Unit(s) SubCutaneous every 6 hours  NIFEdipine XL 60 milliGRAM(s) Oral daily  petrolatum Ophthalmic Ointment 1 Application(s) Both EYES two times a day  sevelamer carbonate Powder 800 milliGRAM(s) Oral every 8 hours  sodium bicarbonate 650 milliGRAM(s) Oral every 8 hours  sodium chloride 3%  Inhalation 4 milliLiter(s) Inhalation every 12 hours    MEDICATIONS  (PRN):  dextrose Oral Gel 15 Gram(s) Oral once PRN Blood Glucose LESS THAN 70 milliGRAM(s)/deciliter  sodium chloride 0.9% lock flush 10 milliLiter(s) IV Push every 1 hour PRN Pre/post blood products, medications, blood draw, and to maintain line patency        LABS                                            6.9                   Neurophils% (auto):   85.2   (05-16 @ 01:00):    10.87)-----------(286          Lymphocytes% (auto):  5.5                                           20.5                   Eosinphils% (auto):   2.9      Manual%: Neutrophils x    ; Lymphocytes x    ; Eosinophils x    ; Bands%: x    ; Blasts x                                    136    |  95     |  52                  Calcium: 8.2   / iCa: x      (05-16 @ 01:00)    ----------------------------<  229       Magnesium: 2.40                             4.0     |  25     |  4.55             Phosphorous: 5.5      TPro  5.8    /  Alb  2.3    /  TBili  <0.2   /  DBili  x      /  AST  11     /  ALT  8      /  AlkPhos  136    16 May 2024 01:00    ( 05-16 @ 01:00 )   PT: 13.5 sec;   INR: 1.21 ratio  aPTT: 28.4 sec          ASSESSMENT & PLAN:     73M w/ PMH of HTN, ESRD (MWF), IDDM, p/f chest pain c/b hiccups for the last 2 days undergoing ischemic evaluation per cardiology. Pt s/p RRT x 2 for AMS. MICU consulted and accepting for airway monitoring in setting of baclofen toxicity +/- stroke now pending trach.     NEUROLOGY     #Pontine and Cerebellar strokes  - MR from 5/6 showing R pontine and cerebellar infarcts  - unclear timeline but may be inciting event for resp failure  - neurology following  - previously treated with Plavix, heparin, ASA for NSTEMI  - MRA head and neck w/o large vessel occlusion    CARDIOVASCULAR   #Angina  - patient admitted with chest pain and noted to have peaked T waves on admission   - TTE showing EF 72%  - EKG 5/3 demonstrating ST deviation noted on EKG  - Per cardiology, no plan for cath at this time  - s/p DAPT load 5/4  - will c/w ASA 81 mg daily with heparin gtt x 48 hours (now complete)  - repeat TTE (5/4) demonstrating no significant interval changes  - asymptomatic troponin iso ESRD    #HTN  - holding home medication iso hypotension    RESPIRATORY   - intubated due to inability to protect airway  - extubated 5/9 c/b weak cough with desatting improved with deep nasal suction and chest PT   - hypophonic and requiring airway clearance   - hypoxic and poor cough; reintubated on 5/12 for airway protection and hypoxia.  - trach 5/14     GI/NUTRITION   #Gastroporesis   - hold reglan for now   - failed speech and swallow after extubation    /RENAL   #ESRD MWF  - R fem shiley removed 5/2  - placement of IJ shiley 5/3 then removed 5/10 iso positive BCx  - pending fistula study of RUE (failed previous HD session)  - tolerating HD  - post MRI JET HD completed (2 sessions)  - new HD IJ catheter placed 5/13 with HD scheduled 5/13  - s/p cath keli 5/14 now easily flushing      INFECTIOUS DISEASE   #leukocytosis  - noted on repeat labs  - 5 day course of zosyn (5/3 - 5/8)  - now with rising WBC iso fever and positive BCx (B. Cereus; 1/2 with subsequent BCx negative at 24 hours); also with RLQ abdominal pain and CT abd showing R common iliac thrombus.   - started on meropenem and vanc (will need to be dosed by level)  - appreciate ID reccs    ENDOCRINE   #IDDM  - cw ISS    HEMATOLOGY/ONCOLOGY  #Common illiac vein thrombus  - holding heparin gtt for trach today    ETHICS  Full code

## 2024-05-16 NOTE — DISCHARGE NOTE PROVIDER - CARE PROVIDER_API CALL
Prachi Omer  Internal Medicine  [address] [facility] [facility]  [place] [address]  Phone: [phone]  Fax: [phone]  Established Patient  Follow Up Time: 1 week    Geovani Bravo  Internal Medicine  [address] [place] [address]  Phone: [phone]  Fax: [phone]  Follow Up Time:     Luis F Stallworth  Gastroenterology  [address] [facility], [address]  [place] [address]  Phone: [phone]  Fax: [phone]  Follow Up Time:     Adolfo Garcia  Nephrology  [address] [place] [address]  Phone: [phone]  Fax: [phone]  Follow Up Time:     Kellee Mora  Neurology  [address] [facility] [address]  [place] [address]  Phone: [phone]  Fax: [phone]  Follow Up Time: 1 week    Jessica Lockett  Vascular Surgery  [address] [facility], [address]  [place] [address]  Phone: [phone]  Fax: [phone]  Follow Up Time: 2 weeks

## 2024-05-16 NOTE — DISCHARGE NOTE PROVIDER - DETAILS OF MALNUTRITION DIAGNOSIS/DIAGNOSES
This patient has been assessed with a concern for Malnutrition and was treated during this hospitalization for the following Nutrition diagnosis/diagnoses:     -  05/01/2024: Severe protein-calorie malnutrition   -  05/01/2024: Underweight (BMI < 19)

## 2024-05-16 NOTE — PROGRESS NOTE ADULT - SUBJECTIVE AND OBJECTIVE BOX
INTERVAL HPI/OVERNIGHT EVENTS:    SUBJECTIVE: Patient seen and examined at bedside. Intubated, sedated, ROS cannot be obtained.       VITAL SIGNS:  ICU Vital Signs Last 24 Hrs  T(C): 37.9 (16 May 2024 04:00), Max: 38.2 (15 May 2024 20:00)  T(F): 100.2 (16 May 2024 04:00), Max: 100.8 (15 May 2024 20:00)  HR: 85 (16 May 2024 07:50) (60 - 86)  BP: 199/60 (16 May 2024 07:00) (114/80 - 199/60)  BP(mean): 96 (16 May 2024 07:00) (77 - 159)  ABP: --  ABP(mean): --  RR: 12 (16 May 2024 07:00) (12 - 13)  SpO2: 99% (16 May 2024 07:50) (94% - 100%)    O2 Parameters below as of 16 May 2024 07:50  Patient On (Oxygen Delivery Method): ventilator          Mode: AC/ CMV (Assist Control/ Continuous Mandatory Ventilation), RR (machine): 12, TV (machine): 500, FiO2: 40, PEEP: 8, ITime: 0.8, MAP: 11, PIP: 26  Plateau pressure:   P/F ratio:     05-15 @ 07:01  -  05-16 @ 07:00  --------------------------------------------------------  IN: 891.2 mL / OUT: 1400 mL / NET: -508.8 mL      CAPILLARY BLOOD GLUCOSE      POCT Blood Glucose.: 212 mg/dL (16 May 2024 05:26)    ECG:    PHYSICAL EXAMINATION:  General: Comfortable, no acute distress, cooperative with exam.  HEENT: PERRLA, EOMI, moist mucous membranes.  Respiratory: CTAB, normal respiratory effort, no coughing, wheezes, crackles, or rales.  CV: RRR, S1S2, no murmurs, rubs or gallops. No JVD. Distal pulses intact.  Abdominal: Soft, nontender, nondistended, no rebound or guarding, normal bowel sounds.  Neurology: AOx3, no focal neuro defects, BLACK x 4.  Extremities: No pitting edema, + Peripheral pulses.  Incisions:   Tubes:    MEDICATIONS:  MEDICATIONS  (STANDING):  aspirin  chewable 81 milliGRAM(s) Oral daily  carvedilol 12.5 milliGRAM(s) Oral every 12 hours  chlorhexidine 0.12% Liquid 15 milliLiter(s) Oral Mucosa every 12 hours  chlorhexidine 2% Cloths 1 Application(s) Topical <User Schedule>  dextrose 10% Bolus 125 milliLiter(s) IV Bolus once  dextrose 5%. 1000 milliLiter(s) (50 mL/Hr) IV Continuous <Continuous>  dextrose 5%. 1000 milliLiter(s) (100 mL/Hr) IV Continuous <Continuous>  dextrose 50% Injectable 12.5 Gram(s) IV Push once  dextrose 50% Injectable 25 Gram(s) IV Push once  epoetin reilly (EPOGEN) Injectable 23526 Unit(s) IV Push <User Schedule>  glucagon  Injectable 1 milliGRAM(s) IntraMuscular once  hydrALAZINE 100 milliGRAM(s) Oral three times a day  insulin lispro (ADMELOG) corrective regimen sliding scale   SubCutaneous every 6 hours  NIFEdipine XL 60 milliGRAM(s) Oral daily  petrolatum Ophthalmic Ointment 1 Application(s) Both EYES two times a day  sevelamer carbonate Powder 800 milliGRAM(s) Oral every 8 hours  sodium bicarbonate 650 milliGRAM(s) Oral every 8 hours  sodium chloride 3%  Inhalation 4 milliLiter(s) Inhalation every 12 hours    MEDICATIONS  (PRN):  dextrose Oral Gel 15 Gram(s) Oral once PRN Blood Glucose LESS THAN 70 milliGRAM(s)/deciliter  sodium chloride 0.9% lock flush 10 milliLiter(s) IV Push every 1 hour PRN Pre/post blood products, medications, blood draw, and to maintain line patency      ALLERGIES:  Allergies    No Known Allergies    Intolerances        LABS:                        6.9    10.87 )-----------( 286      ( 16 May 2024 01:00 )             20.5     05-16    136  |  95<L>  |  52<H>  ----------------------------<  229<H>  4.0   |  25  |  4.55<H>    Ca    8.2<L>      16 May 2024 01:00  Phos  5.5     05-16  Mg     2.40     05-16    TPro  5.8<L>  /  Alb  2.3<L>  /  TBili  <0.2  /  DBili  x   /  AST  11  /  ALT  8   /  AlkPhos  136<H>  05-16    PT/INR - ( 16 May 2024 01:00 )   PT: 13.5 sec;   INR: 1.21 ratio         PTT - ( 16 May 2024 01:00 )  PTT:28.4 sec  Urinalysis Basic - ( 16 May 2024 01:00 )    Color: x / Appearance: x / SG: x / pH: x  Gluc: 229 mg/dL / Ketone: x  / Bili: x / Urobili: x   Blood: x / Protein: x / Nitrite: x   Leuk Esterase: x / RBC: x / WBC x   Sq Epi: x / Non Sq Epi: x / Bacteria: x        RADIOLOGY & ADDITIONAL TESTS: Reviewed.   INTERVAL HPI/OVERNIGHT EVENTS:    SUBJECTIVE: Patient seen and examined at bedside.   Trach on vent  Shaking head no to any pain.       VITAL SIGNS:  ICU Vital Signs Last 24 Hrs  T(C): 37.9 (16 May 2024 04:00), Max: 38.2 (15 May 2024 20:00)  T(F): 100.2 (16 May 2024 04:00), Max: 100.8 (15 May 2024 20:00)  HR: 85 (16 May 2024 07:50) (60 - 86)  BP: 199/60 (16 May 2024 07:00) (114/80 - 199/60)  BP(mean): 96 (16 May 2024 07:00) (77 - 159)  ABP: --  ABP(mean): --  RR: 12 (16 May 2024 07:00) (12 - 13)  SpO2: 99% (16 May 2024 07:50) (94% - 100%)    O2 Parameters below as of 16 May 2024 07:50  Patient On (Oxygen Delivery Method): ventilator          Mode: AC/ CMV (Assist Control/ Continuous Mandatory Ventilation), RR (machine): 12, TV (machine): 500, FiO2: 40, PEEP: 8, ITime: 0.8, MAP: 11, PIP: 26  Plateau pressure:   P/F ratio:     05-15 @ 07:01  -  05-16 @ 07:00  --------------------------------------------------------  IN: 891.2 mL / OUT: 1400 mL / NET: -508.8 mL      CAPILLARY BLOOD GLUCOSE      POCT Blood Glucose.: 212 mg/dL (16 May 2024 05:26)    ECG:    PHYSICAL EXAMINATION:  General: Comfortable, no acute distress, cooperative with exam.  HEENT: PERRLA, EOMI, moist mucous membranes.  Respiratory: CTAB, normal respiratory effort, no coughing, wheezes, crackles, or rales.  CV: RRR, S1S2, no murmurs, rubs or gallops. No JVD. Distal pulses intact.  Abdominal: Soft, nontender, nondistended, no rebound or guarding, normal bowel sounds.  Neurology: AO, no focal neuro defects, BLACK x 4.  Extremities: No pitting edema, + Peripheral pulses.  Tubes: HD cath RIJ    MEDICATIONS:  MEDICATIONS  (STANDING):  aspirin  chewable 81 milliGRAM(s) Oral daily  carvedilol 12.5 milliGRAM(s) Oral every 12 hours  chlorhexidine 0.12% Liquid 15 milliLiter(s) Oral Mucosa every 12 hours  chlorhexidine 2% Cloths 1 Application(s) Topical <User Schedule>  dextrose 10% Bolus 125 milliLiter(s) IV Bolus once  dextrose 5%. 1000 milliLiter(s) (50 mL/Hr) IV Continuous <Continuous>  dextrose 5%. 1000 milliLiter(s) (100 mL/Hr) IV Continuous <Continuous>  dextrose 50% Injectable 12.5 Gram(s) IV Push once  dextrose 50% Injectable 25 Gram(s) IV Push once  epoetin reilly (EPOGEN) Injectable 36808 Unit(s) IV Push <User Schedule>  glucagon  Injectable 1 milliGRAM(s) IntraMuscular once  hydrALAZINE 100 milliGRAM(s) Oral three times a day  insulin lispro (ADMELOG) corrective regimen sliding scale   SubCutaneous every 6 hours  NIFEdipine XL 60 milliGRAM(s) Oral daily  petrolatum Ophthalmic Ointment 1 Application(s) Both EYES two times a day  sevelamer carbonate Powder 800 milliGRAM(s) Oral every 8 hours  sodium bicarbonate 650 milliGRAM(s) Oral every 8 hours  sodium chloride 3%  Inhalation 4 milliLiter(s) Inhalation every 12 hours    MEDICATIONS  (PRN):  dextrose Oral Gel 15 Gram(s) Oral once PRN Blood Glucose LESS THAN 70 milliGRAM(s)/deciliter  sodium chloride 0.9% lock flush 10 milliLiter(s) IV Push every 1 hour PRN Pre/post blood products, medications, blood draw, and to maintain line patency      ALLERGIES:  Allergies    No Known Allergies    Intolerances        LABS:                        6.9    10.87 )-----------( 286      ( 16 May 2024 01:00 )             20.5     05-16    136  |  95<L>  |  52<H>  ----------------------------<  229<H>  4.0   |  25  |  4.55<H>    Ca    8.2<L>      16 May 2024 01:00  Phos  5.5     05-16  Mg     2.40     05-16    TPro  5.8<L>  /  Alb  2.3<L>  /  TBili  <0.2  /  DBili  x   /  AST  11  /  ALT  8   /  AlkPhos  136<H>  05-16    PT/INR - ( 16 May 2024 01:00 )   PT: 13.5 sec;   INR: 1.21 ratio         PTT - ( 16 May 2024 01:00 )  PTT:28.4 sec  Urinalysis Basic - ( 16 May 2024 01:00 )    Color: x / Appearance: x / SG: x / pH: x  Gluc: 229 mg/dL / Ketone: x  / Bili: x / Urobili: x   Blood: x / Protein: x / Nitrite: x   Leuk Esterase: x / RBC: x / WBC x   Sq Epi: x / Non Sq Epi: x / Bacteria: x        RADIOLOGY & ADDITIONAL TESTS: Reviewed.

## 2024-05-16 NOTE — PROGRESS NOTE ADULT - SUBJECTIVE AND OBJECTIVE BOX
[facility]   MD ELIANE BHAGAT MD ANGELA WONG, PA QIAN CHEN, NP    TEL:  OFFICE: [phone]  From 5pm-7am Answering Service [phone]    -- RENAL FOLLOW UP NOTE ---Date of Service 05-16-24 @ 10:39    Patient is a 73y old  Male who presents with a chief complaint of Unstable angina, abn EKG.    Patient seen and examined at bedside, in ICU. No chest pain/SOB.  Trached to vent; no respiratory distress.  Equal rise/fall of chest.    VITALS:  T(F): 100.2 (05-16-24 @ 04:00), Max: 100.8 (05-15-24 @ 20:00)  HR: 69 (05-16-24 @ 09:00)  BP: 137/59 (05-16-24 @ 09:00)  RR: 12 (05-16-24 @ 09:00)  SpO2: 100% (05-16-24 @ 09:00)  Wt(kg): --    05-15 @ 07:01  -  05-16 @ 07:00  --------------------------------------------------------  IN: 891.2 mL / OUT: 1400 mL / NET: -508.8 mL      PHYSICAL EXAM:  General: NAD  Neck: No JVD  Respiratory: CTAB, no wheezes, rales or rhonchi  Cardiovascular: S1, S2, RRR  Gastrointestinal: BS+, soft, NT/ND  Extremities: No peripheral edema    Hospital Medications:   MEDICATIONS  (STANDING):  aspirin  chewable 81 milliGRAM(s) Oral daily  carvedilol 12.5 milliGRAM(s) Oral every 12 hours  chlorhexidine 0.12% Liquid 15 milliLiter(s) Oral Mucosa every 12 hours  chlorhexidine 2% Cloths 1 Application(s) Topical <User Schedule>  dextrose 10% Bolus 125 milliLiter(s) IV Bolus once  dextrose 5%. 1000 milliLiter(s) (50 mL/Hr) IV Continuous <Continuous>  dextrose 5%. 1000 milliLiter(s) (100 mL/Hr) IV Continuous <Continuous>  dextrose 50% Injectable 12.5 Gram(s) IV Push once  dextrose 50% Injectable 25 Gram(s) IV Push once  epoetin reilly (EPOGEN) Injectable 48510 Unit(s) IV Push <User Schedule>  glucagon  Injectable 1 milliGRAM(s) IntraMuscular once  heparin  Infusion. 900 Unit(s)/Hr (9 mL/Hr) IV Continuous <Continuous>  hydrALAZINE 100 milliGRAM(s) Oral three times a day  insulin lispro (ADMELOG) corrective regimen sliding scale   SubCutaneous every 6 hours  insulin NPH human recombinant 4 Unit(s) SubCutaneous every 6 hours  NIFEdipine XL 60 milliGRAM(s) Oral daily  petrolatum Ophthalmic Ointment 1 Application(s) Both EYES two times a day  sevelamer carbonate Powder 800 milliGRAM(s) Oral every 8 hours  sodium bicarbonate 650 milliGRAM(s) Oral every 8 hours  sodium chloride 3%  Inhalation 4 milliLiter(s) Inhalation every 12 hours      LABS:  05-16    136  |  95<L>  |  52<H>  ----------------------------<  229<H>  4.0   |  25  |  4.55<H>    Ca    8.2<L>      16 May 2024 01:00  Phos  5.5     05-16  Mg     2.40     05-16    TPro  5.8<L>  /  Alb  2.3<L>  /  TBili  <0.2  /  DBili      /  AST  11  /  ALT  8   /  AlkPhos  136<H>  05-16    Creatinine Trend: 4.55 <--, 6.91 <--, 5.83 <--, 8.00 <--, 7.97 <--, 6.68 <--, 4.24 <--, 4.23 <--, 3.99 <--    Albumin: 2.3 g/dL (05-16 @ 01:00)  Phosphorus: 5.5 mg/dL (05-16 @ 01:00)                          6.9    10.87 )-----------( 286      ( 16 May 2024 01:00 )             20.5     Urine Studies:  Urinalysis - [05-16-24 @ 01:00]      Color  / Appearance  / SG  / pH       Gluc 229 / Ketone   / Bili  / Urobili        Blood  / Protein  / Leuk Est  / Nitrite       RBC  / WBC  / Hyaline  / Gran  / Sq Epi  / Non Sq Epi  / Bacteria       Iron 47, TIBC --, %sat --      [05-01-24 @ 06:44]  TSH 2.79      [04-30-24 @ 06:03]    HBsAb <3.0      [05-01-24 @ 14:42]  HBcAb Nonreact      [05-01-24 @ 14:42]

## 2024-05-16 NOTE — DISCHARGE NOTE PROVIDER - CARE PROVIDERS DIRECT ADDRESSES
[email or web address]

## 2024-05-16 NOTE — PROGRESS NOTE ADULT - ATTENDING SUPERVISION STATEMENT
Fellow
Resident
Fellow
Resident

## 2024-05-16 NOTE — PROGRESS NOTE ADULT - SUBJECTIVE AND OBJECTIVE BOX
Patient is a 73y Male     Patient is a 73y old  Male who presents with a chief complaint of Unstable angina, abn EKG (15 May 2024 21:11)      HPI:  73-year-old male past medical history hypertension, ESRD on dialysis Monday Wednesday Friday, diabetes insulin-dependent presents with hiccups patient endorses persistent hiccups for the last 2 days. found to have peaked T waves, a new finding. denies dizziness, N/V, denies CP, palps, abd pain (29 Apr 2024 21:15)      PAST MEDICAL & SURGICAL HISTORY:  Diabetes      Benign essential HTN      HLD (hyperlipidemia)      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      ESRD on hemodialysis      Arteriovenous fistula          MEDICATIONS  (STANDING):  aspirin  chewable 81 milliGRAM(s) Oral daily  chlorhexidine 0.12% Liquid 15 milliLiter(s) Oral Mucosa every 12 hours  chlorhexidine 2% Cloths 1 Application(s) Topical <User Schedule>  dextrose 10% Bolus 125 milliLiter(s) IV Bolus once  dextrose 5%. 1000 milliLiter(s) (50 mL/Hr) IV Continuous <Continuous>  dextrose 5%. 1000 milliLiter(s) (100 mL/Hr) IV Continuous <Continuous>  dextrose 50% Injectable 12.5 Gram(s) IV Push once  dextrose 50% Injectable 25 Gram(s) IV Push once  epoetin reilly (EPOGEN) Injectable 97841 Unit(s) IV Push <User Schedule>  glucagon  Injectable 1 milliGRAM(s) IntraMuscular once  hydrALAZINE 100 milliGRAM(s) Oral three times a day  insulin lispro (ADMELOG) corrective regimen sliding scale   SubCutaneous every 6 hours  petrolatum Ophthalmic Ointment 1 Application(s) Both EYES two times a day  sevelamer carbonate Powder 800 milliGRAM(s) Oral every 8 hours  sodium bicarbonate 650 milliGRAM(s) Oral every 8 hours  sodium chloride 3%  Inhalation 4 milliLiter(s) Inhalation every 12 hours      Allergies    No Known Allergies    Intolerances        SOCIAL HISTORY:  Denies ETOh,Smoking,     FAMILY HISTORY:  FHx: diabetes mellitus (Father, Aunt)        REVIEW OF SYSTEMS:    CONSTITUTIONAL: No weakness, fevers or chills  EYES/ENT: No visual changes;  No vertigo or throat pain   NECK: No pain or stiffness  RESPIRATORY: No cough, wheezing, hemoptysis; No shortness of breath  CARDIOVASCULAR: No chest pain or palpitations  GASTROINTESTINAL: No abdominal or epigastric pain. No nausea, vomiting, or hematemesis; No diarrhea or constipation. No melena or hematochezia.  GENITOURINARY: No dysuria, frequency or hematuria  NEUROLOGICAL: No numbness or weakness  SKIN: No itching, burning, rashes, or lesions   All other review of systems is negative unless indicated above.    VITAL:  T(C): , Max: 38.2 (05-15-24 @ 20:00)  T(F): , Max: 100.8 (05-15-24 @ 20:00)  HR: 72 (05-16-24 @ 05:00)  BP: 114/80 (05-16-24 @ 05:00)  BP(mean): 92 (05-16-24 @ 05:00)  RR: 13 (05-16-24 @ 05:00)  SpO2: 100% (05-16-24 @ 05:00)  Wt(kg): --    I and O's:    05-13 @ 07:01  -  05-14 @ 07:00  --------------------------------------------------------  IN: 1777 mL / OUT: 800 mL / NET: 977 mL    05-14 @ 07:01  -  05-15 @ 07:00  --------------------------------------------------------  IN: 809.8 mL / OUT: 0 mL / NET: 809.8 mL    05-15 @ 07:01  -  05-16 @ 06:37  --------------------------------------------------------  IN: 891.2 mL / OUT: 1400 mL / NET: -508.8 mL          PHYSICAL EXAM:    Constitutional: NAD  HEENT: PERRLA,   Neck: No JVD  Respiratory: CTA B/L  Cardiovascular: S1 and S2  Gastrointestinal: BS+, soft, NT/ND  Extremities: No peripheral edema  Neurological: A/O x 3, no focal deficits  Psychiatric: Normal mood, normal affect  : No Abbasi  Skin: No rashes  Access: Not applicable  Back: No CVA tenderness    LABS:                        6.9    10.87 )-----------( 286      ( 16 May 2024 01:00 )             20.5     05-16    136  |  95<L>  |  52<H>  ----------------------------<  229<H>  4.0   |  25  |  4.55<H>    Ca    8.2<L>      16 May 2024 01:00  Phos  5.5     05-16  Mg     2.40     05-16    TPro  5.8<L>  /  Alb  2.3<L>  /  TBili  <0.2  /  DBili  x   /  AST  11  /  ALT  8   /  AlkPhos  136<H>  05-16          RADIOLOGY & ADDITIONAL STUDIES:

## 2024-05-16 NOTE — DISCHARGE NOTE PROVIDER - PROVIDER TOKENS
PROVIDER:[TOKEN:[[id number]],FOLLOWUP:[1 week],ESTABLISHEDPATIENT:[T]],PROVIDER:[TOKEN:[[id number]]],PROVIDER:[TOKEN:[[id number]]],PROVIDER:[TOKEN:[[id number]]],PROVIDER:[TOKEN:[[id number]],FOLLOWUP:[1 week]],PROVIDER:[TOKEN:[[id number]],FOLLOWUP:[2 weeks]]

## 2024-05-16 NOTE — CHART NOTE - NSCHARTNOTEFT_GEN_A_CORE
RCU Acceptance Note     HOSPITAL COURSE   72 YO M with PMHx of ESRD on HD MWF, HTN, and IDDM2 A1C 6.9 who presented chest pain complicated by hiccups x 2 days and admitted for NSTEMI concern to medicine.     While on medicine, patient continued on medical management for NSTEMI. Trops elevated, but CKMB negative. ECHO normal. ECG with ST deviation. Case discussed with cardiology and loaded with DAPT and started on heparin GTT x 48 hours for medical management. No CATH recommended at this time. Patient started on baclofen for hiccup control, however course complicated by AMS with concern for baclofen toxicity. RRT called x 2 and patient ultimately intubated for airway monitoring and transferred to MICU.     While in MICU, patient found with DA and cerebellar stroke via MRI BRAIN and sedation with mental status improved and extubated. For baclofen toxicity, patient received HD x 1, however then noted with right radiocephalic fistula stenosis requiring R IJ SHILEY placement in MICU on 5/3 and CRRT. VA AVF (5/2) with Right radiocephalic arteriovenous fistula has no hemodynamically significant stenosis present at the anastomotic site ( cm/sec, EDV 49 cm/sec). The usable segment is partially thrombosed with minimal patency. CRRT converted back to iHD on MWF. Course complicated by fevers and aspiration event requiring reintubation . Course further complicated by B Cereus Bacteremia (5/14) and R IJ SHILEY line removed and replaced on 5/13, R common iliac DVT, and prolonged vent time requiring tracheostomy with IP on 5/14. Patient ultimately transferred to RCU on 5/16.       Mode: AC/ CMV (Assist Control/ Continuous Mandatory Ventilation), RR (machine): 12, TV (machine): 500, FiO2: 30, PEEP: 8, ITime: 0.8, MAP: 10, PIP: 19  Arterial Blood Gas:  05-16 @ 01:00  7.48/39/115/29/98.7/5.1  ABG lactate: --  Arterial Blood Gas:  05-15 @ 03:30  7.51/30/142/24/99.4/0.9  ABG lactate: --  Arterial Blood Gas:  05-15 @ 01:15  7.64/18/163/19/98.8/-1.7  ABG lactate: --      OBJECTIVE:  ICU Vital Signs Last 24 Hrs  T(C): 37.1 (16 May 2024 16:00), Max: 38.2 (15 May 2024 20:00)  T(F): 98.7 (16 May 2024 16:00), Max: 100.8 (15 May 2024 20:00)  HR: 60 (16 May 2024 17:02) (60 - 86)  BP: 122/57 (16 May 2024 16:00) (114/80 - 199/60)  BP(mean): 81 (16 May 2024 10:00) (77 - 159)  ABP: --  ABP(mean): --  RR: 14 (16 May 2024 16:00) (12 - 14)  SpO2: 99% (16 May 2024 17:02) (94% - 100%)    O2 Parameters below as of 16 May 2024 16:00  Patient On (Oxygen Delivery Method): ventilator  O2 Flow (L/min): 40        Mode: AC/ CMV (Assist Control/ Continuous Mandatory Ventilation), RR (machine): 12, TV (machine): 500, FiO2: 30, PEEP: 8, ITime: 0.8, MAP: 10, PIP: 19    05-15 @ 07:01  -  05-16 @ 07:00  --------------------------------------------------------  IN: 891.2 mL / OUT: 1400 mL / NET: -508.8 mL    05-16 @ 07:01 - 05-16 @ 19:25  --------------------------------------------------------  IN: 18 mL / OUT: 0 mL / NET: 18 mL      CAPILLARY BLOOD GLUCOSE  POCT Blood Glucose.: 208 mg/dL (16 May 2024 17:09)      HOSPITAL MEDICATIONS:  MEDICATIONS  (STANDING):  albuterol/ipratropium for Nebulization 3 milliLiter(s) Nebulizer every 12 hours  aspirin  chewable 81 milliGRAM(s) Oral daily  atorvastatin 20 milliGRAM(s) Oral at bedtime  carvedilol 12.5 milliGRAM(s) Oral every 12 hours  chlorhexidine 0.12% Liquid 15 milliLiter(s) Oral Mucosa every 12 hours  chlorhexidine 2% Cloths 1 Application(s) Topical <User Schedule>  dextrose 10% Bolus 125 milliLiter(s) IV Bolus once  dextrose 5%. 1000 milliLiter(s) (50 mL/Hr) IV Continuous <Continuous>  dextrose 5%. 1000 milliLiter(s) (100 mL/Hr) IV Continuous <Continuous>  dextrose 50% Injectable 12.5 Gram(s) IV Push once  dextrose 50% Injectable 25 Gram(s) IV Push once  epoetin reilly (EPOGEN) Injectable 51644 Unit(s) IV Push <User Schedule>  glucagon  Injectable 1 milliGRAM(s) IntraMuscular once  heparin  Infusion. 900 Unit(s)/Hr (9 mL/Hr) IV Continuous <Continuous>  hydrALAZINE 100 milliGRAM(s) Oral three times a day  insulin lispro (ADMELOG) corrective regimen sliding scale   SubCutaneous every 6 hours  insulin NPH human recombinant 4 Unit(s) SubCutaneous every 6 hours  petrolatum Ophthalmic Ointment 1 Application(s) Both EYES two times a day  sevelamer carbonate Powder 800 milliGRAM(s) Oral every 8 hours  sodium bicarbonate 650 milliGRAM(s) Oral every 8 hours  sodium chloride 3%  Inhalation 4 milliLiter(s) Inhalation every 12 hours    MEDICATIONS  (PRN):  acetaminophen     Tablet .. 650 milliGRAM(s) Oral every 6 hours PRN Temp greater or equal to 38C (100.4F), Mild Pain (1 - 3)  aluminum hydroxide/magnesium hydroxide/simethicone Suspension 30 milliLiter(s) Oral every 4 hours PRN Dyspepsia  dextrose Oral Gel 15 Gram(s) Oral once PRN Blood Glucose LESS THAN 70 milliGRAM(s)/deciliter  melatonin 3 milliGRAM(s) Oral at bedtime PRN Insomnia  ondansetron Injectable 4 milliGRAM(s) IV Push every 8 hours PRN Nausea and/or Vomiting  sodium chloride 0.9% lock flush 10 milliLiter(s) IV Push every 1 hour PRN Pre/post blood products, medications, blood draw, and to maintain line patency    LABS:                        8.1    9.46  )-----------( 269      ( 16 May 2024 16:55 )             23.5     05-16    136  |  95<L>  |  52<H>  ----------------------------<  229<H>  4.0   |  25  |  4.55<H>    Ca    8.2<L>      16 May 2024 01:00  Phos  5.5     05-16  Mg     2.40     05-16    TPro  5.8<L>  /  Alb  2.3<L>  /  TBili  <0.2  /  DBili  x   /  AST  11  /  ALT  8   /  AlkPhos  136<H>  05-16    PT/INR - ( 16 May 2024 01:00 )   PT: 13.5 sec;   INR: 1.21 ratio         PTT - ( 16 May 2024 16:55 )  PTT:31.3 sec  Urinalysis Basic - ( 16 May 2024 01:00 )    Color: x / Appearance: x / SG: x / pH: x  Gluc: 229 mg/dL / Ketone: x  / Bili: x / Urobili: x   Blood: x / Protein: x / Nitrite: x   Leuk Esterase: x / RBC: x / WBC x   Sq Epi: x / Non Sq Epi: x / Bacteria: x      Arterial Blood Gas:  05-16 @ 01:00  7.48/39/115/29/98.7/5.1  ABG lactate: --  Arterial Blood Gas:  05-15 @ 03:30  7.51/30/142/24/99.4/0.9  ABG lactate: --  Arterial Blood Gas:  05-15 @ 01:15  7.64/18/163/19/98.8/-1.7  ABG lactate: --    Venous Blood Gas:  05-15 @ 09:50  7.35/45/56/25/81.1  VBG Lactate: 1.1      PAST MEDICAL & SURGICAL HISTORY:  Diabetes      Benign essential HTN      HLD (hyperlipidemia)      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    ASSESSMENT AND PLAN  72 YO M with PMHx of ESRD on HD MWF, HTN, and 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 Baclofen started and course complicated by AMS second to baclofen toxicity requiring intubation and MICU transfer. Course further complicated by LEFT da and cerebellum stroke, R AVF stenosis, aspiration and B Cereus bacteremia and RLE DVT. s/p trach and transferred to RCU 5/16    NEUROLOGY  # AMS   - MRI HEAD (5/6) with punctate foci in the left da and left cerebellum with concern for acute infarct.   - MRA HEAD and NECK (5/6) with no large vessel occlusion or stenosis.  - Continue on aspirin and hold DAPT for now pending procedures   - Continue on lipitor for medical management   - Supportive care     CARDIOVASCULAR  # CP with NSTEMI concern   - Admitted for CP with peaked T WAVES and ECG with ST deviation on admission   - Troponins elevated on admission with negative CKMB   - TTE (4/30) with EF 72, normal LVRVSF, and no regional wall motion abnormalities   - Case discussed with cardiology and no acute need for cath. DAPT loaded on 5/4 and plan for medical management with ASA, lipitor and heparin GTT.     # Septic vs vasoplegic shock  # Hx of HTN   - Home BP medications held and pressors weaned off   - Continue on coreg 12.5 and hydral 100   - Monitor BP     RESPIRATORY  # Respiratory failure   - AMS noted with baclofen toxicity requiring intubation   - Attempted extubation in MICU however course complicated by aspiration and prolonged course and now s/p tracheostomy with IP on 5/14  - Continue on vent and wean as able   - Continue on nebs and HTS Q12H for now     GI  # Dysphagia   - Continue on NGT   - Plan for PEG tomorrow with sx     RENAL  # ESRD on HD MWF with R BCF  # Fistula stenosis ?  - VA AVF (5/2) with Right radiocephalic arteriovenous fistula has no hemodynamically significant stenosis present at the anastomotic site ( cm/sec, EDV 49 cm/sec). The usable segment is partially thrombosed with minimal patency.  - Required R FEMORAL line on medicine, then removed on 5/2, and replaced with R IJ SHILEY on 5/3 for CRRT then converted back to iHD MWF   - R SHILEY removed on 5/10 second to bacteremia and replaced on 5/13   - Continue on HD MWF per HD   - Continue on renvela and HCO3 tabs   - Plan for fistulogram tomorrow with vascular during PEG     INFECTIOUS DISEASE  # Aspiration   # B Cereus Bacteremia   - Course complicated by fevers and aspiration event   - MRSA (5/1) negative   - BCx (5/2) negative   - SCx (5/4) and BAL (5/12) negative   - BCx (5/10) with bacillus cereus and cleared on (5/12).   - Case discussed with ID and plans to continue on vanco through 5/20.     HEME  # AOCD with ESRD   - Transfused on 5/16 and EPO 10K continued on TIW   - Monitor HH and check anemia panel     VASCULAR   # RLE DVT   - CT AP (5/12) with nonocclusive RIGHT common iliac vein deep venous thrombosis  - Continue on heparin GTT   - RLE precautions     ENDOCRINE  # IDDM2 A1C 6.9  - Continue on NPH 4U and ISS   - Monitor and adjust insulin based on FS     SKIN  - R FEMORAL (5/1-5/2)   - R IJ SHILEY (5/3-5/10)   - R IJ SHILEY (5/13 - )     ETHICS/ GOC    - FULL CODE     DISPO - TBD    JANET Mercedes, PA-C  Department of Medicine/ [facility]  In house [facility] [phone]  In house Medicine [facility] [phone]  Reachable via teams

## 2024-05-16 NOTE — PROGRESS NOTE ADULT - CRITICAL CARE ATTENDING COMMENT
continue cvvhdf
Critically ill patient requiring frequent bedside visits with therapy changes.
Medical management as above, review of results/records, discussion with patient and primary team, documentation.
Critically ill patient requiring frequent bedside visits with therapy changes.
continue cvvhdf
Critically ill patient requiring frequent bedside visits with therapy changes.
Medical management as above, review of results/records, discussion with patient and primary team, documentation.
Critically ill patient requiring frequent bedside visits with therapy changes.
Critically ill patient requiring frequent bedside visits with therapy changes.

## 2024-05-16 NOTE — PROGRESS NOTE ADULT - SUBJECTIVE AND OBJECTIVE BOX
CHIEF COMPLAINT: Patient is a 73y old  Male who presents with a chief complaint of Unstable angina, abn EKG (15 May 2024 17:27)      Interval Events:  POD2, no active issues, trach site looks well.     REVIEW OF SYSTEMS:  [x] All other systems negative except per HPI   [ ] Unable to assess ROS because ________    OBJECTIVE:  ICU Vital Signs Last 24 Hrs  T(C): 37.1 (16 May 2024 16:00), Max: 38.2 (15 May 2024 20:00)  T(F): 98.7 (16 May 2024 16:00), Max: 100.8 (15 May 2024 20:00)  HR: 60 (16 May 2024 17:02) (60 - 86)  BP: 122/57 (16 May 2024 16:00) (114/80 - 199/60)  BP(mean): 81 (16 May 2024 10:00) (77 - 159)  ABP: --  ABP(mean): --  RR: 14 (16 May 2024 16:00) (12 - 14)  SpO2: 99% (16 May 2024 17:02) (94% - 100%)    O2 Parameters below as of 16 May 2024 16:00  Patient On (Oxygen Delivery Method): ventilator  O2 Flow (L/min): 40              Mode: AC/ CMV (Assist Control/ Continuous Mandatory Ventilation), RR (machine): 12, TV (machine): 500, FiO2: 40, PEEP: 8, ITime: 0.8, MAP: 11, PIP: 24    05-14 @ 07:01  -  05-15 @ 07:00  --------------------------------------------------------  IN: 809.8 mL / OUT: 0 mL / NET: 809.8 mL    05-15 @ 07:01  -  05-15 @ 19:00  --------------------------------------------------------  IN: 611.2 mL / OUT: 1400 mL / NET: -788.8 mL        PHYSICAL EXAM:  GENERAL: NAD, well-groomed, well-developed  HEAD:  Atraumatic, Normocephalic  EYES: EOMI, PERRLA, conjunctiva and sclera clear  ENMT: No tonsillar erythema, exudates, or enlargement; Moist mucous membranes, Good dentition, No lesions  NECK: Supple, No JVD, Normal thyroid  CHEST/LUNG: Clear to auscultation bilaterally; No rales, rhonchi, wheezing, or rubs  HEART: Regular rate and rhythm; No murmurs, rubs, or gallops  ABDOMEN: Soft, Nontender, Nondistended; Bowel sounds present  VASCULAR:  2+ Peripheral Pulses, No clubbing, cyanosis, or edema  LYMPH: No lymphadenopathy noted  SKIN: No rashes or lesions  NERVOUS SYSTEM:  sedated,     HOSPITAL MEDICATIONS:  MEDICATIONS  (STANDING):  aspirin  chewable 81 milliGRAM(s) Oral daily  chlorhexidine 0.12% Liquid 15 milliLiter(s) Oral Mucosa every 12 hours  chlorhexidine 2% Cloths 1 Application(s) Topical <User Schedule>  dextrose 10% Bolus 125 milliLiter(s) IV Bolus once  dextrose 5%. 1000 milliLiter(s) (50 mL/Hr) IV Continuous <Continuous>  dextrose 5%. 1000 milliLiter(s) (100 mL/Hr) IV Continuous <Continuous>  dextrose 50% Injectable 12.5 Gram(s) IV Push once  dextrose 50% Injectable 25 Gram(s) IV Push once  epoetin reilly (EPOGEN) Injectable 10899 Unit(s) IV Push <User Schedule>  glucagon  Injectable 1 milliGRAM(s) IntraMuscular once  insulin lispro (ADMELOG) corrective regimen sliding scale   SubCutaneous every 6 hours  norepinephrine Infusion 0.05 MICROgram(s)/kG/Min (4.93 mL/Hr) IV Continuous <Continuous>  petrolatum Ophthalmic Ointment 1 Application(s) Both EYES two times a day  propofol Infusion 19.987 MICROgram(s)/kG/Min (6.31 mL/Hr) IV Continuous <Continuous>  sevelamer carbonate Powder 800 milliGRAM(s) Oral every 8 hours  sodium bicarbonate 650 milliGRAM(s) Oral every 8 hours  sodium chloride 3%  Inhalation 4 milliLiter(s) Inhalation every 12 hours    MEDICATIONS  (PRN):  dextrose Oral Gel 15 Gram(s) Oral once PRN Blood Glucose LESS THAN 70 milliGRAM(s)/deciliter  sodium chloride 0.9% lock flush 10 milliLiter(s) IV Push every 1 hour PRN Pre/post blood products, medications, blood draw, and to maintain line patency      LABS:    The Labs were reviewed by me   The Radiology was reviewed by me    EKG tracing reviewed by me    05-15    137  |  95<L>  |  93<H>  ----------------------------<  193<H>  3.9   |  19<L>  |  6.91<H>  05-14    136  |  94<L>  |  79<H>  ----------------------------<  228<H>  3.8   |  19<L>  |  5.83<H>  05-13    137  |  93<L>  |  102<H>  ----------------------------<  189<H>  4.5   |  14<L>  |  8.00<H>    Ca    7.9<L>      15 May 2024 01:15  Ca    8.4      14 May 2024 00:13  Ca    8.9      13 May 2024 02:40  Phos  6.3     05-15  Mg     2.50     05-15    TPro  5.7<L>  /  Alb  2.3<L>  /  TBili  0.2  /  DBili  x   /  AST  15  /  ALT  8   /  AlkPhos  103  05-15  TPro  5.8<L>  /  Alb  2.7<L>  /  TBili  0.3  /  DBili  x   /  AST  14  /  ALT  9   /  AlkPhos  114  05-14  TPro  5.9<L>  /  Alb  2.5<L>  /  TBili  0.3  /  DBili  x   /  AST  9   /  ALT  12  /  AlkPhos  110  05-13    Magnesium: 2.50 mg/dL (05-15-24 @ 01:15)  Magnesium: 2.40 mg/dL (05-14-24 @ 00:13)  Magnesium: 2.90 mg/dL (05-13-24 @ 02:40)  Magnesium: 2.90 mg/dL (05-12-24 @ 20:28)    Phosphorus: 6.3 mg/dL (05-15-24 @ 01:15)  Phosphorus: 6.6 mg/dL (05-14-24 @ 00:13)  Phosphorus: 8.1 mg/dL (05-13-24 @ 02:40)  Phosphorus: 9.0 mg/dL (05-12-24 @ 20:28)      PT/INR - ( 15 May 2024 01:15 )   PT: 13.3 sec;   INR: 1.18 ratio         PTT - ( 15 May 2024 01:15 )  PTT:25.9 sec              Urinalysis Basic - ( 15 May 2024 01:15 )    Color: x / Appearance: x / SG: x / pH: x  Gluc: 193 mg/dL / Ketone: x  / Bili: x / Urobili: x   Blood: x / Protein: x / Nitrite: x   Leuk Esterase: x / RBC: x / WBC x   Sq Epi: x / Non Sq Epi: x / Bacteria: x      ABG - ( 15 May 2024 03:30 )  pH, Arterial: 7.51  pH, Blood: x     /  pCO2: 30    /  pO2: 142   / HCO3: 24    / Base Excess: 0.9   /  SaO2: 99.4                                    7.2    9.67  )-----------( 305      ( 15 May 2024 01:15 )             20.5                         8.4    13.53 )-----------( 360      ( 14 May 2024 00:13 )             25.0                         8.6    21.36 )-----------( 385      ( 13 May 2024 02:40 )             24.8     CAPILLARY BLOOD GLUCOSE      POCT Blood Glucose.: 245 mg/dL (15 May 2024 17:56)  POCT Blood Glucose.: 159 mg/dL (15 May 2024 11:18)  POCT Blood Glucose.: 292 mg/dL (15 May 2024 05:37)  POCT Blood Glucose.: 162 mg/dL (14 May 2024 23:34)        MICROBIOLOGY:     RADIOLOGY:  [ ] Reviewed and interpreted by me    Point of Care Ultrasound Findings:    PFT:    EKG:

## 2024-05-16 NOTE — DISCHARGE NOTE PROVIDER - NSDCCPCAREPLAN_GEN_ALL_CORE_FT
PRINCIPAL DISCHARGE DIAGNOSIS  Diagnosis: Stroke  Assessment and Plan of Treatment: You had an MRI which showed "punctate foci in the left talya and left cerebellum with concern for acute infarct". You were seen by Neurology and your medication regimen was adjusted. Please continue medication as prescribed. Please follow up as outpatient with Neurology after rehab for continued care.      SECONDARY DISCHARGE DIAGNOSES  Diagnosis: Hypertension  Assessment and Plan of Treatment: Continue blood pressure medication regimen as directed. Monitor for any visual changes, headaches or dizziness.  Monitor blood pressure regularly.  Follow up with your primary care provider for further management for high blood pressure.      Diagnosis: Acute respiratory failure with hypoxia  Assessment and Plan of Treatment: You were intubated for airway protection in the setting of altered mental status. You had a prolonged course and now have a tracheostomy. Please follow up as outpatient with Pulmonology upon discharge and continue tracheostomy care per nursing protocol at the Rehab.    Diagnosis: Dysphagia  Assessment and Plan of Treatment: You have a surgical feeding tube which was placed on 5/17. Please continue with feeds through the feeding tube. Please do not eat normally through your mouth as you are high risk for aspiration/ food into the lung which can result in aspiration pneumonia, serious infection, respiratory failure and worst case death. Please follow up with your PCP.    Diagnosis: Anemia secondary to renal failure  Assessment and Plan of Treatment: You were given multiple units of blood for anemia due to your kidney disease. There was question of gastrointestinal bleed and the GI doctors were consulted. You are continued on iron supplementation and a proton pump inhibitor. Please follow up with Gastrology, Nephrology and your primary care doctor upon discharge.    Diagnosis: ESRD on dialysis  Assessment and Plan of Treatment: Please continue to follow your dialysis schedule and refer to your primary provider/nephrologist for further care and monitoring of kidney function and electrolytes. Continue a renal restricted diet (Avoiding foods high in potassium and phosphorus), your prescribed medications, and supplementations as directed.      Diagnosis: Deep vein thrombosis (DVT)  Assessment and Plan of Treatment: You have a blood clot also know as a DVT. You are on a blood thinner called Eliquis. Please continue to take your blood thinner as prescribed. Please follow up as outpatient with your PCP. Please monitor for shortness of breath, chest pain, black or bloody stools, weakness and if you have concerning signs or symptoms please return to ER.    Diagnosis: Non-ST elevation MI (NSTEMI)  Assessment and Plan of Treatment: You had a type 2 heart attack due to distributive shock. Please continue your current medication regimen and follow up with Cardiology and your PCP upon discharge.

## 2024-05-16 NOTE — PROGRESS NOTE ADULT - ASSESSMENT
73-year-old male past medical history hypertension, ESRD on dialysis Monday Wednesday Friday, diabetes insulin-dependent presents with hiccups patient endorses persistent hiccups for the last 2 days. Nephrology consulted for HD needs.    A/P  ESRD:  Center: [facility]  Nephrologist: Dr. Hardwick  Access: R AVF  MWF schedule  Vascular for fistulogram   s/p femoral shiley on 5/1, now removed  s/p placement of IJ shiley 5/3  Stopped CRRT   Restarted IHD  s/p HD 5/7 UF 1778; treatment terminated early due to hypotension   S/P MRA head/neck w/ gadolinium --> Received HD on 5/9 and 5/10.  5/10 Will need to dialyze 3 days consecutively--> plan for HD on 5/11  5/10 Spoke to ICU; will UF net even tomorrow  5/11 shiley removed due to bacteremia; did not receive HD.  Line holiday  5/12 - BUN worsened; K up trending.    5/13 Shiley placed, s/p HD   5/16 - s/p HD on 5/15.  Plan for HD tomorrow.  Consent obtained and placed in ED chart  Renal diet  Monitor BMP    Hyperkalemia  Improved w/ HD.  C/W HD schedule as above.  Lokelma 10gm PRN for K >5.3 on nonHD days.  Low K diet.  Monitor closely     HTN:  BP fluctuating.  Off pressors.  On carvedilol 12.5mg BID, hydralazine 100mg TID, nifedipine 60mg qd.  Care per ICU.  Monitor BP.    Anemia:  SILVA w/ HD.  Tranfuse for Hgb <7.  Monitor Hb.    CKD-MBD  Check PTH  PO4 improving but remains high.  Consider increased sevelamer 800mg to 1600mg TID.  Low PO4 diet.  Monitor Ca, PO4 daily    Hypocalcemia:  In setting of CKD vs. hypoalbuminemia.  Optimize albumin.  Improving.  Monitor Ca.    D/W family.

## 2024-05-16 NOTE — DISCHARGE NOTE PROVIDER - NSFOLLOWUPCLINICS_GEN_ALL_ED_FT
[facility] Pulmonolgy and Sleep Medicine  Pulmonology  [address], [address]  [place] [address]  Phone: [phone]  Fax:     [facility]  Psychiatry  [address] [place] [address]  Phone: [phone]  Fax:

## 2024-05-16 NOTE — CONSULT NOTE ADULT - ASSESSMENT
ASSESSMENT: 72y/o M w/ PMHx HTN, ESRD on dialysis MWF, DM, presented with hiccups x2days, found to have peaked T waves. S/p RRT for AMS x2 5/1 and was intubated for airway protection in setting of possible baclofen overdose. Hospital course c/b L pontine and cerebellar infarcts (MRI 5/6), concern for ACS s/p DAPT load and heparin gtt x48hrs and acute hypoxic resp failure s/p extubation 5/9 and re-intubation 5/12, s/p trach 5/14. Patient also on heparin gtt for nonocclusive R common iliac vein thrombus. General surgery consulted for G tube placement.    PLAN:   - Will plan for PEG vs lap assisted vs open G tube Friday 5/17  - Hold heparin gtt starting at midnight before OR  - NPO at midnight  - Preop labs - CBC, BMP, coags  - Continue medical optimization, including transfusion for Hgb <7.0  - Please document cardiac optimization/risk stratification  - Will speak with wife for consent      Patient discussed with attending surgeon, Dr. Wheatley.    Liana Sanders, PGY-2  B Team Surgery  [phone]

## 2024-05-16 NOTE — DISCHARGE NOTE PROVIDER - NSDCFUADDINST_GEN_ALL_CORE_FT
WOUND CARE  - Tracheostomy:  Cleanse around trach site with NS. Pat dry. Apply Silicone foam dressing without border beneath trach collar, cut mid dressing to "Y" shape. Change foam dressing every shift and prn. Change trach ties every 3 days.   - PEG with Peritubular Skin: Cleanse q shift with NS, apply liquid barrier film beneath jalen disc.  If redness noted under jalen disc bumper apply thin foam  dressing without border (Mepilex Lite)- cut to mid dressing with a 'Y' shape. Secondary securement to abdomen taping in 'H' fashion with Steri-strips.  - Continue to turn  and position as per hospital protocol with Z flow positioning devices   - Incontience care as per hospital protocol   - Continue to offload heels with comaplte cair boots.

## 2024-05-16 NOTE — CONSULT NOTE ADULT - SUBJECTIVE AND OBJECTIVE BOX
GENERAL SURGERY CONSULT NOTE  --------------------------------------------------------------------------------------------  HPI:  73-year-old male past medical history hypertension, ESRD on dialysis Monday Wednesday Friday, diabetes insulin-dependent presents with hiccups patient endorses persistent hiccups for the last 2 days. found to have peaked T waves, a new finding. denies dizziness, N/V, denies CP, palps, abd pain (29 Apr 2024 21:15)      ***      PAST MEDICAL & SURGICAL HISTORY:  Diabetes      Benign essential HTN      HLD (hyperlipidemia)      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      ESRD on hemodialysis      Arteriovenous fistula        FAMILY HISTORY:  FHx: diabetes mellitus (Father, Aunt)    [] Family history not pertinent as reviewed with the patient and family    SOCIAL HISTORY:  ***    ALLERGIES: No Known Allergies      HOME MEDICATIONS: ***    CURRENT MEDICATIONS  MEDICATIONS (STANDING): aspirin  chewable 81 milliGRAM(s) Oral daily  carvedilol 12.5 milliGRAM(s) Oral every 12 hours  dextrose 10% Bolus 125 milliLiter(s) IV Bolus once  dextrose 5%. 1000 milliLiter(s) IV Continuous <Continuous>  dextrose 5%. 1000 milliLiter(s) IV Continuous <Continuous>  dextrose 50% Injectable 25 Gram(s) IV Push once  dextrose 50% Injectable 12.5 Gram(s) IV Push once  epoetin reilly (EPOGEN) Injectable 70525 Unit(s) IV Push <User Schedule>  glucagon  Injectable 1 milliGRAM(s) IntraMuscular once  hydrALAZINE 100 milliGRAM(s) Oral three times a day  insulin lispro (ADMELOG) corrective regimen sliding scale   SubCutaneous every 6 hours  NIFEdipine XL 60 milliGRAM(s) Oral daily  sevelamer carbonate Powder 800 milliGRAM(s) Oral every 8 hours  sodium bicarbonate 650 milliGRAM(s) Oral every 8 hours  sodium chloride 3%  Inhalation 4 milliLiter(s) Inhalation every 12 hours    MEDICATIONS (PRN):dextrose Oral Gel 15 Gram(s) Oral once PRN Blood Glucose LESS THAN 70 milliGRAM(s)/deciliter  sodium chloride 0.9% lock flush 10 milliLiter(s) IV Push every 1 hour PRN Pre/post blood products, medications, blood draw, and to maintain line patency    --------------------------------------------------------------------------------------------    Vitals:   T(C): 37.9 (05-16-24 @ 04:00), Max: 38.2 (05-15-24 @ 20:00)  HR: 85 (05-16-24 @ 07:50) (60 - 86)  BP: 199/60 (05-16-24 @ 07:00) (114/80 - 199/60)  RR: 12 (05-16-24 @ 07:00) (12 - 13)  SpO2: 99% (05-16-24 @ 07:50) (94% - 100%)  CAPILLARY BLOOD GLUCOSE      POCT Blood Glucose.: 212 mg/dL (16 May 2024 05:26)  POCT Blood Glucose.: 280 mg/dL (15 May 2024 23:10)  POCT Blood Glucose.: 245 mg/dL (15 May 2024 17:56)  POCT Blood Glucose.: 159 mg/dL (15 May 2024 11:18)      05-15 @ 07:01  -  05-16 @ 07:00  --------------------------------------------------------  IN:    Modular Fluid: 1 mL    Nepro: 135 mL    Norepinephrine: 30 mL    Other (mL): 400 mL    PRBCs (Packed Red Blood Cells): 280 mL    Propofol: 45.2 mL  Total IN: 891.2 mL    OUT:    Other (mL): 1400 mL  Total OUT: 1400 mL    Total NET: -508.8 mL              PHYSICAL EXAM: ***  General: NAD, Lying in bed comfortably  Neuro: Alert and answering questions appropriately   HEENT: Grossly normal, EOMI  Cardio: No peripherial edema  Resp: Good effort, no signs of respiratory distress  GI/Abd: Soft, NT/ND, no rebound/guarding, no masses palpated  Vascular: All 4 extremities warm  Musculoskeletal: All 4 extremities moving spontaneously, no limitations  --------------------------------------------------------------------------------------------    LABS  CBC (05-16 @ 01:00)                              6.9<LL>                         10.87<H>  )----------------(  286        85.2<H>% Neutrophils, 5.5<L>% Lymphocytes, ANC: 9.27<H>                              20.5<LL>  CBC (05-15 @ 01:15)                              7.2<L>                         9.67    )----------------(  305        85.7<H>% Neutrophils, 6.5<L>% Lymphocytes, ANC: 8.29<H>                              20.5<LL>    BMP (05-16 @ 01:00)             136     |  95<L>   |  52<H> 		Ca++ --      Ca 8.2<L>             ---------------------------------( 229<H>		Mg 2.40               4.0     |  25      |  4.55<H>			Ph 5.5<H>  BMP (05-15 @ 09:50)             --      |  --      |  --    		Ca++ 0.97<L>  Ca --                 ---------------------------------( --    		Mg --                 --      |  --      |  --    			Ph --        LFTs (05-16 @ 01:00)      TPro 5.8<L> / Alb 2.3<L> / TBili <0.2 / DBili -- / AST 11 / ALT 8 / AlkPhos 136<H>  LFTs (05-15 @ 01:15)      TPro 5.7<L> / Alb 2.3<L> / TBili 0.2 / DBili -- / AST 15 / ALT 8 / AlkPhos 103    Coags (05-16 @ 01:00)  aPTT 28.4 / INR 1.21<H> / PT 13.5<H>  Coags (05-15 @ 01:15)  aPTT 25.9 / INR 1.18 / PT 13.3<H>      ABG (05-16 @ 01:00)     7.48<H> / 39 / 115<H> / 29<H> / 5.1<H> / 98.7<H>%     Lactate:    ABG (05-15 @ 03:30)     7.51<H> / 30<L> / 142<H> / 24 / 0.9 / 99.4<H>%     Lactate:      VBG (05-15 @ 09:50)     7.35 / 45 / 56<H> / 25 / -0.8 / 81.1%     Lactate: 1.1    --------------------------------------------------------------------------------------------    MICROBIOLOGY  Urinalysis (05-16 @ 01:00):     Color:  / Appearance:  / SG:  / pH:  / Gluc: 229<H> / Ketones:  / Bili:  / Urobili:  / Protein : / Nitrites:  / Leuk.Est:  / RBC:  / WBC:  / Sq Epi:  / Non Sq Epi:  / Bacteria        -> .Bronchial Bronchial Lavage Culture (05-12 @ 15:06)       Moderate polymorphonuclear leukocytes per low power field  Rare Squamous epithelial cells per low power field  Rare Gram Positive Cocci in Clusters per oil power field<!>    NG    Normal Respiratory Jocelyn present<!>    -> .Blood Blood-Peripheral Culture (05-11 @ 14:00)     NG    NG    No growth at 4 days    -> .Sputum Sputum Culture (05-10 @ 16:45)       Rare polymorphonuclear leukocytes per low power field  Rare Squamous epithelial cells per low power field  Rare Gram Negative Rods per oil power field<!>    NG    Normal Respiratory Jocelyn present<!>    -> .Blood Blood-Peripheral Culture (05-10 @ 04:00)     NG    NG    No growth at 5 days    -> .Blood Blood-Peripheral Culture (05-10 @ 03:45)       Growth in anaerobic bottle: Gram Positive Rods<!>    Blood Culture PCR<!>    Growth in anaerobic bottle: Bacillus cereus "Susceptibilities not  performed"  Direct identification is available within approximately 3-5  hours either by Blood Panel Multiplexed PCR or Direct  MALDI-TOF. Details: [email or web address]<!>    -> ET Tube ET Tube Culture (05-08 @ 10:05)       Few polymorphonuclear leukocytes per low power field  No Squamous epithelial cells per low power field  No organisms seen per oil power field    NG    Normal Respiratory Jocelyn present      --------------------------------------------------------------------------------------------    IMAGING  ***    --------------------------------------------------------------------------------------------    ASSESSMENT: Patient is a 73ym pmhx ***    PLAN:  ***  -   -   -   -   - Patient seen/examined with attending.  - Plan to be discussed with Attending,   GENERAL SURGERY CONSULT NOTE  --------------------------------------------------------------------------------------------  HPI:    74y/o M w/ PMHx HTN, ESRD on dialysis MWF, DM, presented with hiccups x2days, found to have peaked T waves. S/p RRT for AMS x2 5/1 and was intubated for airway protection in setting of possible baclofen overdose. Hospital course c/b L pontine and cerebellar infarcts (MRI 5/6), concern for ACS s/p DAPT load and hepariin gtt x48hrs and acute hypoxic resp failure s/p extubation 5/9 and re-intubation 5/12, s/p trach 5/14. Patient also on heparin gtt for nonocclusive R common iliac vein thrombus. General surgery consulted for G tube placement.    Patient seen and examined, he is awake and alert, trached on vent w/ NGT in place. Per MICU, patient has been tolerating feeds without issue and there is not a concern for gastroparesis (previously documented) at this time. Per chart review, patient has not had past abdominal surgeries and has no abdominal scars. GI does not see a adequate window for PEG tube, as well as IR on CT review.        PAST MEDICAL & SURGICAL HISTORY:  Diabetes      Benign essential HTN      HLD (hyperlipidemia)      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      ESRD on hemodialysis      Arteriovenous fistula        FAMILY HISTORY:  FHx: diabetes mellitus (Father, Aunt)    [] Family history not pertinent as reviewed with the patient and family    SOCIAL HISTORY:      ALLERGIES: No Known Allergies      HOME MEDICATIONS:     CURRENT MEDICATIONS  MEDICATIONS (STANDING): aspirin  chewable 81 milliGRAM(s) Oral daily  carvedilol 12.5 milliGRAM(s) Oral every 12 hours  dextrose 10% Bolus 125 milliLiter(s) IV Bolus once  dextrose 5%. 1000 milliLiter(s) IV Continuous <Continuous>  dextrose 5%. 1000 milliLiter(s) IV Continuous <Continuous>  dextrose 50% Injectable 25 Gram(s) IV Push once  dextrose 50% Injectable 12.5 Gram(s) IV Push once  epoetin reilly (EPOGEN) Injectable 50383 Unit(s) IV Push <User Schedule>  glucagon  Injectable 1 milliGRAM(s) IntraMuscular once  hydrALAZINE 100 milliGRAM(s) Oral three times a day  insulin lispro (ADMELOG) corrective regimen sliding scale   SubCutaneous every 6 hours  NIFEdipine XL 60 milliGRAM(s) Oral daily  sevelamer carbonate Powder 800 milliGRAM(s) Oral every 8 hours  sodium bicarbonate 650 milliGRAM(s) Oral every 8 hours  sodium chloride 3%  Inhalation 4 milliLiter(s) Inhalation every 12 hours    MEDICATIONS (PRN):dextrose Oral Gel 15 Gram(s) Oral once PRN Blood Glucose LESS THAN 70 milliGRAM(s)/deciliter  sodium chloride 0.9% lock flush 10 milliLiter(s) IV Push every 1 hour PRN Pre/post blood products, medications, blood draw, and to maintain line patency    --------------------------------------------------------------------------------------------    Vitals:   T(C): 37.9 (05-16-24 @ 04:00), Max: 38.2 (05-15-24 @ 20:00)  HR: 85 (05-16-24 @ 07:50) (60 - 86)  BP: 199/60 (05-16-24 @ 07:00) (114/80 - 199/60)  RR: 12 (05-16-24 @ 07:00) (12 - 13)  SpO2: 99% (05-16-24 @ 07:50) (94% - 100%)  CAPILLARY BLOOD GLUCOSE      POCT Blood Glucose.: 212 mg/dL (16 May 2024 05:26)  POCT Blood Glucose.: 280 mg/dL (15 May 2024 23:10)  POCT Blood Glucose.: 245 mg/dL (15 May 2024 17:56)  POCT Blood Glucose.: 159 mg/dL (15 May 2024 11:18)      05-15 @ 07:01  -  05-16 @ 07:00  --------------------------------------------------------  IN:    Modular Fluid: 1 mL    Nepro: 135 mL    Norepinephrine: 30 mL    Other (mL): 400 mL    PRBCs (Packed Red Blood Cells): 280 mL    Propofol: 45.2 mL  Total IN: 891.2 mL    OUT:    Other (mL): 1400 mL  Total OUT: 1400 mL    Total NET: -508.8 mL              PHYSICAL EXAM:   General: NAD, resting in bed comfortably  Neuro: Awake and alert, following commands  Cardio: Regular rate  Resp: Good effort, no signs of respiratory distress, s/p trach, on vent  GI/Abd: Soft, nontender, nondistended, KO tube in place  Vascular: All 4 extremities warm, R AVF, R IJ shiley  --------------------------------------------------------------------------------------------    LABS  CBC (05-16 @ 01:00)                              6.9<LL>                         10.87<H>  )----------------(  286        85.2<H>% Neutrophils, 5.5<L>% Lymphocytes, ANC: 9.27<H>                              20.5<LL>  CBC (05-15 @ 01:15)                              7.2<L>                         9.67    )----------------(  305        85.7<H>% Neutrophils, 6.5<L>% Lymphocytes, ANC: 8.29<H>                              20.5<LL>    BMP (05-16 @ 01:00)             136     |  95<L>   |  52<H> 		Ca++ --      Ca 8.2<L>             ---------------------------------( 229<H>		Mg 2.40               4.0     |  25      |  4.55<H>			Ph 5.5<H>  BMP (05-15 @ 09:50)             --      |  --      |  --    		Ca++ 0.97<L>  Ca --                 ---------------------------------( --    		Mg --                 --      |  --      |  --    			Ph --        LFTs (05-16 @ 01:00)      TPro 5.8<L> / Alb 2.3<L> / TBili <0.2 / DBili -- / AST 11 / ALT 8 / AlkPhos 136<H>  LFTs (05-15 @ 01:15)      TPro 5.7<L> / Alb 2.3<L> / TBili 0.2 / DBili -- / AST 15 / ALT 8 / AlkPhos 103    Coags (05-16 @ 01:00)  aPTT 28.4 / INR 1.21<H> / PT 13.5<H>  Coags (05-15 @ 01:15)  aPTT 25.9 / INR 1.18 / PT 13.3<H>      ABG (05-16 @ 01:00)     7.48<H> / 39 / 115<H> / 29<H> / 5.1<H> / 98.7<H>%     Lactate:    ABG (05-15 @ 03:30)     7.51<H> / 30<L> / 142<H> / 24 / 0.9 / 99.4<H>%     Lactate:      VBG (05-15 @ 09:50)     7.35 / 45 / 56<H> / 25 / -0.8 / 81.1%     Lactate: 1.1    --------------------------------------------------------------------------------------------    MICROBIOLOGY  Urinalysis (05-16 @ 01:00):     Color:  / Appearance:  / SG:  / pH:  / Gluc: 229<H> / Ketones:  / Bili:  / Urobili:  / Protein : / Nitrites:  / Leuk.Est:  / RBC:  / WBC:  / Sq Epi:  / Non Sq Epi:  / Bacteria        -> .Bronchial Bronchial Lavage Culture (05-12 @ 15:06)       Moderate polymorphonuclear leukocytes per low power field  Rare Squamous epithelial cells per low power field  Rare Gram Positive Cocci in Clusters per oil power field<!>    NG    Normal Respiratory Jocelyn present<!>    -> .Blood Blood-Peripheral Culture (05-11 @ 14:00)     NG    NG    No growth at 4 days    -> .Sputum Sputum Culture (05-10 @ 16:45)       Rare polymorphonuclear leukocytes per low power field  Rare Squamous epithelial cells per low power field  Rare Gram Negative Rods per oil power field<!>    NG    Normal Respiratory Jocelyn present<!>    -> .Blood Blood-Peripheral Culture (05-10 @ 04:00)     NG    NG    No growth at 5 days    -> .Blood Blood-Peripheral Culture (05-10 @ 03:45)       Growth in anaerobic bottle: Gram Positive Rods<!>    Blood Culture PCR<!>    Growth in anaerobic bottle: Bacillus cereus "Susceptibilities not  performed"  Direct identification is available within approximately 3-5  hours either by Blood Panel Multiplexed PCR or Direct  MALDI-TOF. Details: [email or web address]<!>    -> ET Tube ET Tube Culture (05-08 @ 10:05)       Few polymorphonuclear leukocytes per low power field  No Squamous epithelial cells per low power field  No organisms seen per oil power field    NG    Normal Respiratory Jocelyn present      --------------------------------------------------------------------------------------------    IMAGING    --------------------------------------------------------------------------------------------

## 2024-05-16 NOTE — PROGRESS NOTE ADULT - SUBJECTIVE AND OBJECTIVE BOX
[facility], Division of Infectious Diseases  AUGUST Carbajal Y. Patel, S. Shah, G. Brian  [phone]  ([phone] - weekdays after 5pm and weekends)    Name: JIMMY BLAND  Age/Gender: 73y Male  MRN: [id number]    Interval History:  Notes reviewed.   febrile overnight  surgery consulted for G tube placement    Allergies: No Known Allergies      Objective:  Vitals:   T(F): 100.2 (05-16-24 @ 04:00), Max: 100.8 (05-15-24 @ 20:00)  HR: 68 (05-16-24 @ 11:02) (65 - 86)  BP: 141/64 (05-16-24 @ 10:00) (114/80 - 199/60)  RR: 14 (05-16-24 @ 10:00) (12 - 14)  SpO2: 99% (05-16-24 @ 11:02) (94% - 100%)  Physical Examination:  General: no acute distress  HEENT: anicteric, NGT, tracheostomy, on vent  Lungs: clear to auscultation anteriorly  Heart: S1, S2, normal rate, RIJ shiley  Abdomen: Soft. Nondistended. NT  Extremities: No LE edema.   Skin: Warm. Dry.     Laboratory Studies:  CBC:                       6.9    10.87 )-----------( 286      ( 16 May 2024 01:00 )             20.5     WBC Trend:  10.87 05-16-24 @ 01:00  9.67 05-15-24 @ 01:15  13.53 05-14-24 @ 00:13  21.36 05-13-24 @ 02:40  31.30 05-12-24 @ 20:28  28.22 05-12-24 @ 03:00  27.31 05-11-24 @ 00:20  14.29 05-10-24 @ 00:50  12.78 05-09-24 @ 23:57    CMP: 05-16    136  |  95<L>  |  52<H>  ----------------------------<  229<H>  4.0   |  25  |  4.55<H>    Ca    8.2<L>      16 May 2024 01:00  Phos  5.5     05-16  Mg     2.40     05-16    TPro  5.8<L>  /  Alb  2.3<L>  /  TBili  <0.2  /  DBili  x   /  AST  11  /  ALT  8   /  AlkPhos  136<H>  05-16      LIVER FUNCTIONS - ( 16 May 2024 01:00 )  Alb: 2.3 g/dL / Pro: 5.8 g/dL / ALK PHOS: 136 U/L / ALT: 8 U/L / AST: 11 U/L / GGT: x           Vancomycin Level, Random: 22.3 ug/mL (05-15-24 @ 09:50)  Vancomycin Level, Random: 26.9 ug/mL (05-13-24 @ 13:50)  Vancomycin Level, Random: 39.0 ug/mL (05-12-24 @ 03:00)    Urinalysis Basic - ( 16 May 2024 01:00 )    Color: x / Appearance: x / SG: x / pH: x  Gluc: 229 mg/dL / Ketone: x  / Bili: x / Urobili: x   Blood: x / Protein: x / Nitrite: x   Leuk Esterase: x / RBC: x / WBC x   Sq Epi: x / Non Sq Epi: x / Bacteria: x      Microbiology: reviewed     Culture - Fungal, Bronchial (collected 05-12-24 @ 15:06)  Source: .Bronchial Bronchial Lavage  Preliminary Report (05-15-24 @ 23:03):    Culture is being performed. Fungal cultures are held for 4 weeks.    Culture - Acid Fast - Bronchial w/Smear (collected 05-12-24 @ 15:06)  Source: .Bronchial Bronchial Lavage  Preliminary Report (05-15-24 @ 23:09):    Culture is being performed.    Culture - Bronchial (collected 05-12-24 @ 15:06)  Source: .Bronchial Bronchial Lavage  Gram Stain (05-12-24 @ 22:40):    Moderate polymorphonuclear leukocytes per low power field    Rare Squamous epithelial cells per low power field    Rare Gram Positive Cocci in Clusters per oil power field  Final Report (05-14-24 @ 08:10):    Normal Respiratory Jocelyn present    Culture - Blood (collected 05-11-24 @ 14:00)  Source: .Blood Blood-Peripheral  Preliminary Report (05-15-24 @ 19:01):    No growth at 4 days    Culture - Blood (collected 05-11-24 @ 14:00)  Source: .Blood Blood-Peripheral  Preliminary Report (05-15-24 @ 19:01):    No growth at 4 days    Culture - Sputum (collected 05-10-24 @ 16:45)  Source: .Sputum Sputum  Gram Stain (05-10-24 @ 22:57):    Rare polymorphonuclear leukocytes per low power field    Rare Squamous epithelial cells per low power field    Rare Gram Negative Rods per oil power field  Final Report (05-12-24 @ 16:49):    Normal Respiratory Jocelyn present    Culture - Blood (collected 05-10-24 @ 04:00)  Source: .Blood Blood-Peripheral  Final Report (05-15-24 @ 07:01):    No growth at 5 days    Culture - Blood (collected 05-10-24 @ 03:45)  Source: .Blood Blood-Peripheral  Gram Stain (05-10-24 @ 19:34):    Growth in anaerobic bottle: Gram Positive Rods  Final Report (05-11-24 @ 14:47):    Growth in anaerobic bottle: Bacillus cereus "Susceptibilities not    performed"    Direct identification is available within approximately 3-5    hours either by Blood Panel Multiplexed PCR or Direct    MALDI-TOF. Details: [email or web address]  Organism: Blood Culture PCR (05-11-24 @ 14:47)  Organism: Blood Culture PCR (05-11-24 @ 14:47)      Method Type: PCR      -  Blood PCR Panel: NEG    Culture - Sputum (collected 05-08-24 @ 10:05)  Source: ET Tube ET Tube  Gram Stain (05-08-24 @ 20:37):    Few polymorphonuclear leukocytes per low power field    No Squamous epithelial cells per low power field    No organisms seen per oil power field  Final Report (05-10-24 @ 11:45):    Normal Respiratory Jocelyn present    Culture - Sputum (collected 05-04-24 @ 04:10)  Source: ET Tube ET Tube  Gram Stain (05-04-24 @ 13:40):    Few polymorphonuclear leukocytes seen per low power field    No Squamous epithelial cells per low power field    Numerous Gram Positive Rods seen per oil power field    Rare Gram Positive Cocci in Clusters seen per oil power field  Final Report (05-05-24 @ 17:14):    Normal Respiratory Jocelyn present    Culture - CSF with Gram Stain (collected 05-02-24 @ 12:15)  Source: .CSF CSF lumbar  Gram Stain (05-02-24 @ 13:51):    polymorphonuclear leukocytes    No organisms seen    by cytocentrifuge  Final Report (05-07-24 @ 08:05):    No growth at 5 days    Culture - Fungal, CSF (collected 05-02-24 @ 12:15)  Source: .CSF CSF lumbar  Preliminary Report (05-11-24 @ 23:02):    No fungus isolated at 1 week.        Radiology: reviewed     Medications:  acetaminophen     Tablet .. 650 milliGRAM(s) Oral every 6 hours PRN  aluminum hydroxide/magnesium hydroxide/simethicone Suspension 30 milliLiter(s) Oral every 4 hours PRN  aspirin  chewable 81 milliGRAM(s) Oral daily  carvedilol 12.5 milliGRAM(s) Oral every 12 hours  chlorhexidine 0.12% Liquid 15 milliLiter(s) Oral Mucosa every 12 hours  chlorhexidine 2% Cloths 1 Application(s) Topical <User Schedule>  dextrose 10% Bolus 125 milliLiter(s) IV Bolus once  dextrose 5%. 1000 milliLiter(s) IV Continuous <Continuous>  dextrose 5%. 1000 milliLiter(s) IV Continuous <Continuous>  dextrose 50% Injectable 12.5 Gram(s) IV Push once  dextrose 50% Injectable 25 Gram(s) IV Push once  dextrose Oral Gel 15 Gram(s) Oral once PRN  epoetin reilly (EPOGEN) Injectable 85359 Unit(s) IV Push <User Schedule>  glucagon  Injectable 1 milliGRAM(s) IntraMuscular once  heparin  Infusion. 900 Unit(s)/Hr IV Continuous <Continuous>  hydrALAZINE 100 milliGRAM(s) Oral three times a day  insulin lispro (ADMELOG) corrective regimen sliding scale   SubCutaneous every 6 hours  insulin NPH human recombinant 4 Unit(s) SubCutaneous every 6 hours  melatonin 3 milliGRAM(s) Oral at bedtime PRN  NIFEdipine XL 60 milliGRAM(s) Oral daily  ondansetron Injectable 4 milliGRAM(s) IV Push every 8 hours PRN  petrolatum Ophthalmic Ointment 1 Application(s) Both EYES two times a day  sevelamer carbonate Powder 800 milliGRAM(s) Oral every 8 hours  sodium bicarbonate 650 milliGRAM(s) Oral every 8 hours  sodium chloride 0.9% lock flush 10 milliLiter(s) IV Push every 1 hour PRN  sodium chloride 3%  Inhalation 4 milliLiter(s) Inhalation every 12 hours    Antimicrobials:

## 2024-05-16 NOTE — DISCHARGE NOTE PROVIDER - INSTRUCTIONS
nepro with carb steady  continuos with starting rate 10 mL/ hour  increase by 10 mL Q6H  unit goal tube feed rate 45 mL per hour   banatrol TF 1 per day

## 2024-05-17 LAB
ALBUMIN SERPL ELPH-MCNC: 2.8 G/DL — LOW (ref 3.3–5)
ALP SERPL-CCNC: 125 U/L — HIGH (ref 40–120)
ALT FLD-CCNC: 10 U/L — SIGNIFICANT CHANGE UP (ref 4–41)
ANION GAP SERPL CALC-SCNC: 22 MMOL/L — HIGH (ref 7–14)
APTT BLD: 37.9 SEC — HIGH (ref 24.5–35.6)
AST SERPL-CCNC: 19 U/L — SIGNIFICANT CHANGE UP (ref 4–40)
BASOPHILS # BLD AUTO: 0.06 K/UL — SIGNIFICANT CHANGE UP (ref 0–0.2)
BASOPHILS NFR BLD AUTO: 0.6 % — SIGNIFICANT CHANGE UP (ref 0–2)
BILIRUB DIRECT SERPL-MCNC: <0.2 MG/DL — SIGNIFICANT CHANGE UP (ref 0–0.3)
BILIRUB INDIRECT FLD-MCNC: >0 MG/DL — SIGNIFICANT CHANGE UP (ref 0–1)
BILIRUB SERPL-MCNC: 0.2 MG/DL — SIGNIFICANT CHANGE UP (ref 0.2–1.2)
BLD GP AB SCN SERPL QL: NEGATIVE — SIGNIFICANT CHANGE UP
BUN SERPL-MCNC: 70 MG/DL — HIGH (ref 7–23)
CALCIUM SERPL-MCNC: 8.4 MG/DL — SIGNIFICANT CHANGE UP (ref 8.4–10.5)
CHLORIDE SERPL-SCNC: 91 MMOL/L — LOW (ref 98–107)
CHOLEST SERPL-MCNC: 86 MG/DL — SIGNIFICANT CHANGE UP
CO2 SERPL-SCNC: 23 MMOL/L — SIGNIFICANT CHANGE UP (ref 22–31)
CREAT SERPL-MCNC: 6.17 MG/DL — HIGH (ref 0.5–1.3)
EGFR: 9 ML/MIN/1.73M2 — LOW
EOSINOPHIL # BLD AUTO: 0.48 K/UL — SIGNIFICANT CHANGE UP (ref 0–0.5)
EOSINOPHIL NFR BLD AUTO: 4.5 % — SIGNIFICANT CHANGE UP (ref 0–6)
FERRITIN SERPL-MCNC: 720 NG/ML — HIGH (ref 30–400)
FOLATE SERPL-MCNC: 10.8 NG/ML — SIGNIFICANT CHANGE UP (ref 3.1–17.5)
GLUCOSE BLDC GLUCOMTR-MCNC: 126 MG/DL — HIGH (ref 70–99)
GLUCOSE BLDC GLUCOMTR-MCNC: 131 MG/DL — HIGH (ref 70–99)
GLUCOSE BLDC GLUCOMTR-MCNC: 135 MG/DL — HIGH (ref 70–99)
GLUCOSE BLDC GLUCOMTR-MCNC: 140 MG/DL — HIGH (ref 70–99)
GLUCOSE SERPL-MCNC: 127 MG/DL — HIGH (ref 70–99)
HCT VFR BLD CALC: 28.1 % — LOW (ref 39–50)
HCT VFR BLD CALC: 29.9 % — LOW (ref 39–50)
HDLC SERPL-MCNC: 27 MG/DL — LOW
HGB BLD-MCNC: 10.3 G/DL — LOW (ref 13–17)
HGB BLD-MCNC: 9.3 G/DL — LOW (ref 13–17)
IANC: 8.42 K/UL — HIGH (ref 1.8–7.4)
IMM GRANULOCYTES NFR BLD AUTO: 1.2 % — HIGH (ref 0–0.9)
IRON SATN MFR SERPL: 13 % — LOW (ref 14–50)
IRON SATN MFR SERPL: 16 UG/DL — LOW (ref 45–165)
LIPID PNL WITH DIRECT LDL SERPL: 29 MG/DL — SIGNIFICANT CHANGE UP
LYMPHOCYTES # BLD AUTO: 0.77 K/UL — LOW (ref 1–3.3)
LYMPHOCYTES # BLD AUTO: 7.3 % — LOW (ref 13–44)
MAGNESIUM SERPL-MCNC: 2.8 MG/DL — HIGH (ref 1.6–2.6)
MCHC RBC-ENTMCNC: 20.9 PG — LOW (ref 27–34)
MCHC RBC-ENTMCNC: 21.3 PG — LOW (ref 27–34)
MCHC RBC-ENTMCNC: 33.1 GM/DL — SIGNIFICANT CHANGE UP (ref 32–36)
MCHC RBC-ENTMCNC: 34.4 GM/DL — SIGNIFICANT CHANGE UP (ref 32–36)
MCV RBC AUTO: 61.8 FL — LOW (ref 80–100)
MCV RBC AUTO: 63 FL — LOW (ref 80–100)
MONOCYTES # BLD AUTO: 0.73 K/UL — SIGNIFICANT CHANGE UP (ref 0–0.9)
MONOCYTES NFR BLD AUTO: 6.9 % — SIGNIFICANT CHANGE UP (ref 2–14)
NEUTROPHILS # BLD AUTO: 8.42 K/UL — HIGH (ref 1.8–7.4)
NEUTROPHILS NFR BLD AUTO: 79.5 % — HIGH (ref 43–77)
NON HDL CHOLESTEROL: 59 MG/DL — SIGNIFICANT CHANGE UP
NRBC # BLD: 0 /100 WBCS — SIGNIFICANT CHANGE UP (ref 0–0)
NRBC # BLD: 0 /100 WBCS — SIGNIFICANT CHANGE UP (ref 0–0)
NRBC # FLD: 0.03 K/UL — HIGH (ref 0–0)
NRBC # FLD: 0.04 K/UL — HIGH (ref 0–0)
PHOSPHATE SERPL-MCNC: 6.5 MG/DL — HIGH (ref 2.5–4.5)
PLATELET # BLD AUTO: 314 K/UL — SIGNIFICANT CHANGE UP (ref 150–400)
PLATELET # BLD AUTO: 315 K/UL — SIGNIFICANT CHANGE UP (ref 150–400)
POTASSIUM SERPL-MCNC: 4.5 MMOL/L — SIGNIFICANT CHANGE UP (ref 3.5–5.3)
POTASSIUM SERPL-SCNC: 4.5 MMOL/L — SIGNIFICANT CHANGE UP (ref 3.5–5.3)
PROT SERPL-MCNC: 6.7 G/DL — SIGNIFICANT CHANGE UP (ref 6–8.3)
RBC # BLD: 4.46 M/UL — SIGNIFICANT CHANGE UP (ref 4.2–5.8)
RBC # BLD: 4.84 M/UL — SIGNIFICANT CHANGE UP (ref 4.2–5.8)
RBC # FLD: 20.3 % — HIGH (ref 10.3–14.5)
RBC # FLD: 20.3 % — HIGH (ref 10.3–14.5)
RH IG SCN BLD-IMP: POSITIVE — SIGNIFICANT CHANGE UP
SODIUM SERPL-SCNC: 136 MMOL/L — SIGNIFICANT CHANGE UP (ref 135–145)
TIBC SERPL-MCNC: 121 UG/DL — LOW (ref 220–430)
TRIGL SERPL-MCNC: 151 MG/DL — HIGH
TSH SERPL-MCNC: 2.6 UIU/ML — SIGNIFICANT CHANGE UP (ref 0.27–4.2)
UIBC SERPL-MCNC: 105 UG/DL — LOW (ref 110–370)
VANCOMYCIN FLD-MCNC: 17.5 UG/ML — SIGNIFICANT CHANGE UP
VANCOMYCIN FLD-MCNC: 23.8 UG/ML — SIGNIFICANT CHANGE UP
VIT B12 SERPL-MCNC: >2000 PG/ML — HIGH (ref 200–900)
WBC # BLD: 10.59 K/UL — HIGH (ref 3.8–10.5)
WBC # BLD: 7.71 K/UL — SIGNIFICANT CHANGE UP (ref 3.8–10.5)
WBC # FLD AUTO: 10.59 K/UL — HIGH (ref 3.8–10.5)
WBC # FLD AUTO: 7.71 K/UL — SIGNIFICANT CHANGE UP (ref 3.8–10.5)

## 2024-05-17 PROCEDURE — 99233 SBSQ HOSP IP/OBS HIGH 50: CPT

## 2024-05-17 PROCEDURE — 43246 EGD PLACE GASTROSTOMY TUBE: CPT | Mod: GC

## 2024-05-17 DEVICE — FEEDING TUBE PEG KIT MIC 20FR PULL: Type: IMPLANTABLE DEVICE | Status: FUNCTIONAL

## 2024-05-17 RX ORDER — ONDANSETRON 8 MG/1
4 TABLET, FILM COATED ORAL ONCE
Refills: 0 | Status: DISCONTINUED | OUTPATIENT
Start: 2024-05-17 | End: 2024-05-17

## 2024-05-17 RX ORDER — SEVELAMER CARBONATE 2400 MG/1
1600 POWDER, FOR SUSPENSION ORAL EVERY 8 HOURS
Refills: 0 | Status: DISCONTINUED | OUTPATIENT
Start: 2024-05-17 | End: 2024-05-17

## 2024-05-17 RX ORDER — FENTANYL CITRATE 50 UG/ML
50 INJECTION INTRAVENOUS ONCE
Refills: 0 | Status: DISCONTINUED | OUTPATIENT
Start: 2024-05-17 | End: 2024-05-17

## 2024-05-17 RX ORDER — SEVELAMER CARBONATE 2400 MG/1
1600 POWDER, FOR SUSPENSION ORAL EVERY 8 HOURS
Refills: 0 | Status: DISCONTINUED | OUTPATIENT
Start: 2024-05-17 | End: 2024-05-21

## 2024-05-17 RX ORDER — FENTANYL CITRATE 50 UG/ML
25 INJECTION INTRAVENOUS
Refills: 0 | Status: DISCONTINUED | OUTPATIENT
Start: 2024-05-17 | End: 2024-05-17

## 2024-05-17 RX ORDER — HEPARIN SODIUM 5000 [USP'U]/ML
1100 INJECTION INTRAVENOUS; SUBCUTANEOUS
Qty: 25000 | Refills: 0 | Status: DISCONTINUED | OUTPATIENT
Start: 2024-05-17 | End: 2024-05-24

## 2024-05-17 RX ORDER — FERROUS SULFATE 325(65) MG
300 TABLET ORAL DAILY
Refills: 0 | Status: DISCONTINUED | OUTPATIENT
Start: 2024-05-17 | End: 2024-06-14

## 2024-05-17 RX ADMIN — Medication 3 MILLILITER(S): at 22:10

## 2024-05-17 RX ADMIN — Medication 100 MILLIGRAM(S): at 12:55

## 2024-05-17 RX ADMIN — SEVELAMER CARBONATE 800 MILLIGRAM(S): 2400 POWDER, FOR SUSPENSION ORAL at 05:59

## 2024-05-17 RX ADMIN — CHLORHEXIDINE GLUCONATE 1 APPLICATION(S): 213 SOLUTION TOPICAL at 06:51

## 2024-05-17 RX ADMIN — Medication 3 MILLILITER(S): at 07:50

## 2024-05-17 RX ADMIN — SEVELAMER CARBONATE 1600 MILLIGRAM(S): 2400 POWDER, FOR SUSPENSION ORAL at 21:01

## 2024-05-17 RX ADMIN — CHLORHEXIDINE GLUCONATE 15 MILLILITER(S): 213 SOLUTION TOPICAL at 18:03

## 2024-05-17 RX ADMIN — SODIUM CHLORIDE 4 MILLILITER(S): 9 INJECTION INTRAMUSCULAR; INTRAVENOUS; SUBCUTANEOUS at 22:11

## 2024-05-17 RX ADMIN — Medication 650 MILLIGRAM(S): at 05:58

## 2024-05-17 RX ADMIN — SODIUM CHLORIDE 4 MILLILITER(S): 9 INJECTION INTRAMUSCULAR; INTRAVENOUS; SUBCUTANEOUS at 07:50

## 2024-05-17 RX ADMIN — CARVEDILOL PHOSPHATE 12.5 MILLIGRAM(S): 80 CAPSULE, EXTENDED RELEASE ORAL at 21:01

## 2024-05-17 RX ADMIN — Medication 650 MILLIGRAM(S): at 21:01

## 2024-05-17 RX ADMIN — ERYTHROPOIETIN 10000 UNIT(S): 10000 INJECTION, SOLUTION INTRAVENOUS; SUBCUTANEOUS at 19:06

## 2024-05-17 RX ADMIN — Medication 4: at 00:25

## 2024-05-17 RX ADMIN — Medication 1 APPLICATION(S): at 18:15

## 2024-05-17 RX ADMIN — Medication 1 APPLICATION(S): at 05:59

## 2024-05-17 RX ADMIN — ATORVASTATIN CALCIUM 20 MILLIGRAM(S): 80 TABLET, FILM COATED ORAL at 21:01

## 2024-05-17 RX ADMIN — Medication 100 MILLIGRAM(S): at 21:00

## 2024-05-17 RX ADMIN — HEPARIN SODIUM 1100 UNIT(S)/HR: 5000 INJECTION INTRAVENOUS; SUBCUTANEOUS at 16:34

## 2024-05-17 RX ADMIN — Medication 100 MILLIGRAM(S): at 05:58

## 2024-05-17 RX ADMIN — CHLORHEXIDINE GLUCONATE 15 MILLILITER(S): 213 SOLUTION TOPICAL at 05:59

## 2024-05-17 NOTE — PROGRESS NOTE ADULT - ASSESSMENT
73M PMHx HTN, ESRD, IDDM p/w hiccups and started on baclofen with concern for AMS overnight and desaturation, ?cheye nascimento respirations. Labs with numerous metabolic derangements. CTH with bifrontal encephalomalacia, likley traumatic.   WBC inc significantly, now intubated. Exam limited as on propofol, also on pressors.   s/p LP for meningitis concerns however appears bland - not on antibiotics  EEG with GPDs, no seizures  MR brain with punctuate L pontine and L cerebellar infarcts  MRA H/N with mild basilar fenestration, otherwise neg  mental status improving post extubation but now reintubated for recurrent aspiration   s/p trach, remains sedated    AMS of unclear etiology - ? baclofen toxicity, no evidence of seizures. Metabolic encephalopathy- would not expect AMS to this degree from small strokes  L pontine and L cerebellar strokes - embolic appearing  Intractable hiccups  hypoxic resp failure  ESRD on HD  B cereus bacteremia, likely contaminant  Nonocclusive R common iliac DVT    MRI, MRA reviewed, as above  cont aspirin 81mg  TTE reviewed, no need to repeat for now  Keppra started for GPDs, no improvement in mental status - now better off keppra  continue to monitor off Keppra, avoid baclofen and reglan  s/p trach  PEG pending today  s/p trach  HD per nephro  f/u remainder of CSF - negative  s/p Zosyn, Meropenem for pna  on hep gtt for DVT  Fistulogram planned per vasc sx

## 2024-05-17 NOTE — PROGRESS NOTE ADULT - SUBJECTIVE AND OBJECTIVE BOX
CHIEF COMPLAINT: Patient is a 73y old  Male who presents with a chief complaint of Unstable angina, abn EKG (17 May 2024 08:00)      INTERVAL EVENTS:    REVIEW OF SYSTEMS: Seen by bedside during AM rounds and     Mode: AC/ CMV (Assist Control/ Continuous Mandatory Ventilation), RR (machine): 12, TV (machine): 500, FiO2: 30, PEEP: 8, ITime: 0.83, MAP: 11, PIP: 22  Arterial Blood Gas:  05-16 @ 01:00  7.48/39/115/29/98.7/5.1  ABG lactate: --      OBJECTIVE:  ICU Vital Signs Last 24 Hrs  T(C): 37.1 (17 May 2024 04:00), Max: 37.3 (16 May 2024 14:00)  T(F): 98.8 (17 May 2024 04:00), Max: 99.1 (16 May 2024 14:00)  HR: 66 (17 May 2024 07:48) (59 - 75)  BP: 159/48 (17 May 2024 04:00) (122/57 - 159/48)  BP(mean): 81 (16 May 2024 10:00) (81 - 81)  ABP: --  ABP(mean): --  RR: 13 (17 May 2024 04:00) (12 - 14)  SpO2: 98% (17 May 2024 07:48) (94% - 100%)    O2 Parameters below as of 17 May 2024 07:48  Patient On (Oxygen Delivery Method): ventilator          Mode: AC/ CMV (Assist Control/ Continuous Mandatory Ventilation), RR (machine): 12, TV (machine): 500, FiO2: 30, PEEP: 8, ITime: 0.83, MAP: 11, PIP: 22    05-16 @ 07:01  -  05-17 @ 07:00  --------------------------------------------------------  IN: 178 mL / OUT: 0 mL / NET: 178 mL      CAPILLARY BLOOD GLUCOSE      POCT Blood Glucose.: 135 mg/dL (17 May 2024 05:33)      HOSPITAL MEDICATIONS:  MEDICATIONS  (STANDING):  albuterol/ipratropium for Nebulization 3 milliLiter(s) Nebulizer every 12 hours  aspirin  chewable 81 milliGRAM(s) Oral daily  atorvastatin 20 milliGRAM(s) Oral at bedtime  carvedilol 12.5 milliGRAM(s) Oral every 12 hours  chlorhexidine 0.12% Liquid 15 milliLiter(s) Oral Mucosa every 12 hours  chlorhexidine 2% Cloths 1 Application(s) Topical <User Schedule>  dextrose 10% Bolus 125 milliLiter(s) IV Bolus once  dextrose 5%. 1000 milliLiter(s) (100 mL/Hr) IV Continuous <Continuous>  dextrose 5%. 1000 milliLiter(s) (50 mL/Hr) IV Continuous <Continuous>  dextrose 50% Injectable 12.5 Gram(s) IV Push once  dextrose 50% Injectable 25 Gram(s) IV Push once  epoetin reilly (EPOGEN) Injectable 78048 Unit(s) IV Push <User Schedule>  glucagon  Injectable 1 milliGRAM(s) IntraMuscular once  heparin  Infusion. 900 Unit(s)/Hr (9 mL/Hr) IV Continuous <Continuous>  hydrALAZINE 100 milliGRAM(s) Oral three times a day  insulin lispro (ADMELOG) corrective regimen sliding scale   SubCutaneous every 6 hours  insulin NPH human recombinant 4 Unit(s) SubCutaneous every 6 hours  petrolatum Ophthalmic Ointment 1 Application(s) Both EYES two times a day  sevelamer carbonate Powder 800 milliGRAM(s) Oral every 8 hours  sodium bicarbonate 650 milliGRAM(s) Oral every 8 hours  sodium chloride 3%  Inhalation 4 milliLiter(s) Inhalation every 12 hours    MEDICATIONS  (PRN):  acetaminophen     Tablet .. 650 milliGRAM(s) Oral every 6 hours PRN Temp greater or equal to 38C (100.4F), Mild Pain (1 - 3)  aluminum hydroxide/magnesium hydroxide/simethicone Suspension 30 milliLiter(s) Oral every 4 hours PRN Dyspepsia  dextrose Oral Gel 15 Gram(s) Oral once PRN Blood Glucose LESS THAN 70 milliGRAM(s)/deciliter  melatonin 3 milliGRAM(s) Oral at bedtime PRN Insomnia  ondansetron Injectable 4 milliGRAM(s) IV Push every 8 hours PRN Nausea and/or Vomiting  sodium chloride 0.9% lock flush 10 milliLiter(s) IV Push every 1 hour PRN Pre/post blood products, medications, blood draw, and to maintain line patency      PHYSICAL EXAMINATION    LABS:                        10.3   10.59 )-----------( 315      ( 17 May 2024 06:00 )             29.9     05-17    136  |  91<L>  |  70<H>  ----------------------------<  127<H>  4.5   |  23  |  6.17<H>    Ca    8.4      17 May 2024 06:00  Phos  6.5     05-17  Mg     2.80     05-17    TPro  6.7  /  Alb  2.8<L>  /  TBili  0.2  /  DBili  <0.2  /  AST  19  /  ALT  10  /  AlkPhos  125<H>  05-17    PT/INR - ( 16 May 2024 01:00 )   PT: 13.5 sec;   INR: 1.21 ratio         PTT - ( 16 May 2024 16:55 )  PTT:31.3 sec  Urinalysis Basic - ( 17 May 2024 06:00 )    Color: x / Appearance: x / SG: x / pH: x  Gluc: 127 mg/dL / Ketone: x  / Bili: x / Urobili: x   Blood: x / Protein: x / Nitrite: x   Leuk Esterase: x / RBC: x / WBC x   Sq Epi: x / Non Sq Epi: x / Bacteria: x      Arterial Blood Gas:  05-16 @ 01:00  7.48/39/115/29/98.7/5.1  ABG lactate: --    Venous Blood Gas:  05-15 @ 09:50  7.35/45/56/25/81.1  VBG Lactate: 1.1      PAST MEDICAL & SURGICAL HISTORY:  Diabetes      Benign essential HTN      HLD (hyperlipidemia)      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 CHIEF COMPLAINT: Patient is a 73y old  Male who presents with a chief complaint of Unstable angina, abn EKG (17 May 2024 08:00)      INTERVAL EVENTS:  - No interval events overnight   - PEG placed today   - Fistulogram rescheduled for next week     REVIEW OF SYSTEMS: Seen by bedside during AM rounds and unable to assess ROS as vented    Mode: AC/ CMV (Assist Control/ Continuous Mandatory Ventilation), RR (machine): 12, TV (machine): 500, FiO2: 30, PEEP: 8, ITime: 0.83, MAP: 11, PIP: 22  Arterial Blood Gas:  05-16 @ 01:00  7.48/39/115/29/98.7/5.1  ABG lactate: --      OBJECTIVE:  ICU Vital Signs Last 24 Hrs  T(C): 37.1 (17 May 2024 04:00), Max: 37.3 (16 May 2024 14:00)  T(F): 98.8 (17 May 2024 04:00), Max: 99.1 (16 May 2024 14:00)  HR: 66 (17 May 2024 07:48) (59 - 75)  BP: 159/48 (17 May 2024 04:00) (122/57 - 159/48)  BP(mean): 81 (16 May 2024 10:00) (81 - 81)  ABP: --  ABP(mean): --  RR: 13 (17 May 2024 04:00) (12 - 14)  SpO2: 98% (17 May 2024 07:48) (94% - 100%)    O2 Parameters below as of 17 May 2024 07:48  Patient On (Oxygen Delivery Method): ventilator          Mode: AC/ CMV (Assist Control/ Continuous Mandatory Ventilation), RR (machine): 12, TV (machine): 500, FiO2: 30, PEEP: 8, ITime: 0.83, MAP: 11, PIP: 22    05-16 @ 07:01  -  05-17 @ 07:00  --------------------------------------------------------  IN: 178 mL / OUT: 0 mL / NET: 178 mL      CAPILLARY BLOOD GLUCOSE  POCT Blood Glucose.: 135 mg/dL (17 May 2024 05:33)      HOSPITAL MEDICATIONS:  MEDICATIONS  (STANDING):  albuterol/ipratropium for Nebulization 3 milliLiter(s) Nebulizer every 12 hours  aspirin  chewable 81 milliGRAM(s) Oral daily  atorvastatin 20 milliGRAM(s) Oral at bedtime  carvedilol 12.5 milliGRAM(s) Oral every 12 hours  chlorhexidine 0.12% Liquid 15 milliLiter(s) Oral Mucosa every 12 hours  chlorhexidine 2% Cloths 1 Application(s) Topical <User Schedule>  dextrose 10% Bolus 125 milliLiter(s) IV Bolus once  dextrose 5%. 1000 milliLiter(s) (100 mL/Hr) IV Continuous <Continuous>  dextrose 5%. 1000 milliLiter(s) (50 mL/Hr) IV Continuous <Continuous>  dextrose 50% Injectable 12.5 Gram(s) IV Push once  dextrose 50% Injectable 25 Gram(s) IV Push once  epoetin reilly (EPOGEN) Injectable 35315 Unit(s) IV Push <User Schedule>  glucagon  Injectable 1 milliGRAM(s) IntraMuscular once  heparin  Infusion. 900 Unit(s)/Hr (9 mL/Hr) IV Continuous <Continuous>  hydrALAZINE 100 milliGRAM(s) Oral three times a day  insulin lispro (ADMELOG) corrective regimen sliding scale   SubCutaneous every 6 hours  insulin NPH human recombinant 4 Unit(s) SubCutaneous every 6 hours  petrolatum Ophthalmic Ointment 1 Application(s) Both EYES two times a day  sevelamer carbonate Powder 800 milliGRAM(s) Oral every 8 hours  sodium bicarbonate 650 milliGRAM(s) Oral every 8 hours  sodium chloride 3%  Inhalation 4 milliLiter(s) Inhalation every 12 hours    MEDICATIONS  (PRN):  acetaminophen     Tablet .. 650 milliGRAM(s) Oral every 6 hours PRN Temp greater or equal to 38C (100.4F), Mild Pain (1 - 3)  aluminum hydroxide/magnesium hydroxide/simethicone Suspension 30 milliLiter(s) Oral every 4 hours PRN Dyspepsia  dextrose Oral Gel 15 Gram(s) Oral once PRN Blood Glucose LESS THAN 70 milliGRAM(s)/deciliter  melatonin 3 milliGRAM(s) Oral at bedtime PRN Insomnia  ondansetron Injectable 4 milliGRAM(s) IV Push every 8 hours PRN Nausea and/or Vomiting  sodium chloride 0.9% lock flush 10 milliLiter(s) IV Push every 1 hour PRN Pre/post blood products, medications, blood draw, and to maintain line patency      PHYSICAL EXAMINATION  General: NAD   HEENT: Trach present   Cards: S1/S2, no murmurs   Pulm: Course vent sounds bilaterally. No wheezes.   Abdomen: Soft, nondistended and nontender. BS (+) PEG placed today.   Extremities: No pedal edema. THAI of BL upper and lower extremities with severe weakness.  Neurology: Awakens with eyes open and able to follow commands, however severely limited by weakness with no acute focal neurological deficits     LABS:                        10.3   10.59 )-----------( 315      ( 17 May 2024 06:00 )             29.9     05-17    136  |  91<L>  |  70<H>  ----------------------------<  127<H>  4.5   |  23  |  6.17<H>    Ca    8.4      17 May 2024 06:00  Phos  6.5     05-17  Mg     2.80     05-17    TPro  6.7  /  Alb  2.8<L>  /  TBili  0.2  /  DBili  <0.2  /  AST  19  /  ALT  10  /  AlkPhos  125<H>  05-17    PT/INR - ( 16 May 2024 01:00 )   PT: 13.5 sec;   INR: 1.21 ratio         PTT - ( 16 May 2024 16:55 )  PTT:31.3 sec    Urinalysis Basic - ( 17 May 2024 06:00 )  Color: x / Appearance: x / SG: x / pH: x  Gluc: 127 mg/dL / Ketone: x  / Bili: x / Urobili: x   Blood: x / Protein: x / Nitrite: x   Leuk Esterase: x / RBC: x / WBC x   Sq Epi: x / Non Sq Epi: x / Bacteria: x      Arterial Blood Gas:  05-16 @ 01:00  7.48/39/115/29/98.7/5.1  ABG lactate: --    Venous Blood Gas:  05-15 @ 09:50  7.35/45/56/25/81.1  VBG Lactate: 1.1      PAST MEDICAL & SURGICAL HISTORY:  Diabetes      Benign essential HTN      HLD (hyperlipidemia)      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

## 2024-05-17 NOTE — PROGRESS NOTE ADULT - SUBJECTIVE AND OBJECTIVE BOX
Vascular Surgery Progress Note     Subjective:  Patient seen and examined on AM rounds.      Vital Signs:  Vital Signs Last 24 Hrs  T(C): 37.1 (17 May 2024 04:00), Max: 37.3 (16 May 2024 14:00)  T(F): 98.8 (17 May 2024 04:00), Max: 99.1 (16 May 2024 14:00)  HR: 66 (17 May 2024 07:48) (59 - 75)  BP: 159/48 (17 May 2024 04:00) (122/57 - 159/48)  BP(mean): --  RR: 13 (17 May 2024 04:00) (12 - 14)  SpO2: 98% (17 May 2024 07:48) (94% - 100%)    Parameters below as of 17 May 2024 07:48  Patient On (Oxygen Delivery Method): ventilator        CAPILLARY BLOOD GLUCOSE      POCT Blood Glucose.: 135 mg/dL (17 May 2024 05:33)  POCT Blood Glucose.: 227 mg/dL (16 May 2024 23:47)  POCT Blood Glucose.: 208 mg/dL (16 May 2024 17:09)  POCT Blood Glucose.: 212 mg/dL (16 May 2024 13:54)      I&O's Detail    16 May 2024 07:01  -  17 May 2024 07:00  --------------------------------------------------------  IN:    Heparin Infusion: 18 mL    Nepro: 160 mL  Total IN: 178 mL    OUT:  Total OUT: 0 mL    Total NET: 178 mL            Physical Exam:  General: awake and alert  HEENT: Normocephalic atraumatic  Respiratory: s/p trach, on vent  Cardio: regular rate  Vascular: extremities are warm and well perfused, palpable b/l radial pulses, R AVF w/ palpable thrill, R IJ shiley in place        Labs:    05-17    136  |  91<L>  |  70<H>  ----------------------------<  127<H>  4.5   |  23  |  6.17<H>    Ca    8.4      17 May 2024 06:00  Phos  6.5     05-17  Mg     2.80     05-17    TPro  6.7  /  Alb  2.8<L>  /  TBili  0.2  /  DBili  <0.2  /  AST  19  /  ALT  10  /  AlkPhos  125<H>  05-17    LIVER FUNCTIONS - ( 17 May 2024 06:00 )  Alb: 2.8 g/dL / Pro: 6.7 g/dL / ALK PHOS: 125 U/L / ALT: 10 U/L / AST: 19 U/L / GGT: x                                 10.3   10.59 )-----------( 315      ( 17 May 2024 06:00 )             29.9     PT/INR - ( 16 May 2024 01:00 )   PT: 13.5 sec;   INR: 1.21 ratio         PTT - ( 16 May 2024 16:55 )  PTT:31.3 sec

## 2024-05-17 NOTE — PROGRESS NOTE ADULT - SUBJECTIVE AND OBJECTIVE BOX
Patient is a 73y Male     Patient is a 73y old  Male who presents with a chief complaint of Unstable angina, abn EKG (16 May 2024 20:46)      HPI:  73-year-old male past medical history hypertension, ESRD on dialysis Monday Wednesday Friday, diabetes insulin-dependent presents with hiccups patient endorses persistent hiccups for the last 2 days. found to have peaked T waves, a new finding. denies dizziness, N/V, denies CP, palps, abd pain (29 Apr 2024 21:15)      PAST MEDICAL & SURGICAL HISTORY:  Diabetes      Benign essential HTN      HLD (hyperlipidemia)      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      ESRD on hemodialysis      Arteriovenous fistula          MEDICATIONS  (STANDING):  albuterol/ipratropium for Nebulization 3 milliLiter(s) Nebulizer every 12 hours  aspirin  chewable 81 milliGRAM(s) Oral daily  atorvastatin 20 milliGRAM(s) Oral at bedtime  carvedilol 12.5 milliGRAM(s) Oral every 12 hours  chlorhexidine 0.12% Liquid 15 milliLiter(s) Oral Mucosa every 12 hours  chlorhexidine 2% Cloths 1 Application(s) Topical <User Schedule>  dextrose 10% Bolus 125 milliLiter(s) IV Bolus once  dextrose 5%. 1000 milliLiter(s) (50 mL/Hr) IV Continuous <Continuous>  dextrose 5%. 1000 milliLiter(s) (100 mL/Hr) IV Continuous <Continuous>  dextrose 50% Injectable 12.5 Gram(s) IV Push once  dextrose 50% Injectable 25 Gram(s) IV Push once  epoetin reilly (EPOGEN) Injectable 15203 Unit(s) IV Push <User Schedule>  glucagon  Injectable 1 milliGRAM(s) IntraMuscular once  heparin  Infusion. 900 Unit(s)/Hr (9 mL/Hr) IV Continuous <Continuous>  hydrALAZINE 100 milliGRAM(s) Oral three times a day  insulin lispro (ADMELOG) corrective regimen sliding scale   SubCutaneous every 6 hours  insulin NPH human recombinant 4 Unit(s) SubCutaneous every 6 hours  petrolatum Ophthalmic Ointment 1 Application(s) Both EYES two times a day  sevelamer carbonate Powder 800 milliGRAM(s) Oral every 8 hours  sodium bicarbonate 650 milliGRAM(s) Oral every 8 hours  sodium chloride 3%  Inhalation 4 milliLiter(s) Inhalation every 12 hours      Allergies    No Known Allergies    Intolerances        SOCIAL HISTORY:  Denies ETOh,Smoking,     FAMILY HISTORY:  FHx: diabetes mellitus (Father, Aunt)        REVIEW OF SYSTEMS:    CONSTITUTIONAL: No weakness, fevers or chills  EYES/ENT: No visual changes;  No vertigo or throat pain   NECK: No pain or stiffness  RESPIRATORY: No cough, wheezing, hemoptysis; No shortness of breath  CARDIOVASCULAR: No chest pain or palpitations  GASTROINTESTINAL: No abdominal or epigastric pain. No nausea, vomiting, or hematemesis; No diarrhea or constipation. No melena or hematochezia.  GENITOURINARY: No dysuria, frequency or hematuria  NEUROLOGICAL: No numbness or weakness  SKIN: No itching, burning, rashes, or lesions   All other review of systems is negative unless indicated above.    VITAL:  T(C): , Max: 37.3 (05-16-24 @ 14:00)  T(F): , Max: 99.1 (05-16-24 @ 14:00)  HR: 66 (05-17-24 @ 07:48)  BP: 159/48 (05-17-24 @ 04:00)  BP(mean): 81 (05-16-24 @ 10:00)  RR: 13 (05-17-24 @ 04:00)  SpO2: 98% (05-17-24 @ 07:48)  Wt(kg): --    I and O's:    05-15 @ 07:01  -  05-16 @ 07:00  --------------------------------------------------------  IN: 891.2 mL / OUT: 1400 mL / NET: -508.8 mL    05-16 @ 07:01  -  05-17 @ 07:00  --------------------------------------------------------  IN: 178 mL / OUT: 0 mL / NET: 178 mL          PHYSICAL EXAM:    Constitutional: NAD  HEENT: PERRLA,   Neck: No JVD  Respiratory: CTA B/L  Cardiovascular: S1 and S2  Gastrointestinal: BS+, soft, NT/ND  Extremities: No peripheral edema  Neurological: A/O x 3, no focal deficits  Psychiatric: Normal mood, normal affect  : No Abbasi  Skin: No rashes  Access: Not applicable  Back: No CVA tenderness    LABS:                        10.3   10.59 )-----------( 315      ( 17 May 2024 06:00 )             29.9     05-17    136  |  91<L>  |  70<H>  ----------------------------<  127<H>  4.5   |  23  |  6.17<H>    Ca    8.4      17 May 2024 06:00  Phos  6.5     05-17  Mg     2.80     05-17    TPro  6.7  /  Alb  2.8<L>  /  TBili  0.2  /  DBili  <0.2  /  AST  19  /  ALT  10  /  AlkPhos  125<H>  05-17          RADIOLOGY & ADDITIONAL STUDIES:

## 2024-05-17 NOTE — PROGRESS NOTE ADULT - ASSESSMENT
72 y/o M PMhx HTN, ESRD (on HD M/W/F), DM who presented with hiccups x 2 days and chest pain found to have peaked T waves. Hospital course c/b AMS s/p RRT on 5/1 and 5/2. Vascular consulted 2/2 RUE AVF access unable to be used s/p R femoral shiley placed for emergent HD. Transferred to MICU and intubated 5/2 for airway protection.   received empiric course of zosyn x 5 days, completed 5/7 and meropenem x 5 days, completed 5/14    B. cereus bacteremia  blood cx 5/10 w/ Bacillus cereus in 1/4 bottles  repeat blood cultures 5/11- NGTD     AMS, CVA  c/f possible baclofen toxicity  TTE- no evidence of valvular disease  s/p LP 5/2, cell count not consistent w/ bacterial meningitis, CSF PCR- negative  MRI- Punctate foci of restricted diffusion in the left talya and left cerebellum with associated T2 prolongation consistent with acute infarcts  s/p trach 5/14  noted tentative plan for PEG vs lap assisted vs open G tube 5/17    DVT  CT- Nonocclusive R common iliac vein deep venous thrombosis.    Recommendations  complete 10 day course of vanc for B cereus bacteremia w/ last day 5/20  - dose by level  - give doses post-HD    Dr. Bell Wheatley will be covering 5/18-5/19. I will resume care on 5/20.     Steven Torres M.D.  [facility], Division of Infectious Diseases  [phone]  After 5pm on weekdays and all day on weekends - please call [phone]  72 y/o M PMhx HTN, ESRD (on HD M/W/F), DM who presented with hiccups x 2 days and chest pain found to have peaked T waves. Hospital course c/b AMS s/p RRT on 5/1 and 5/2. Vascular consulted 2/2 RUE AVF access unable to be used s/p R femoral shiley placed for emergent HD. Transferred to MICU and intubated 5/2 for airway protection.   received empiric course of zosyn x 5 days, completed 5/7 and meropenem x 5 days, completed 5/14    B. cereus bacteremia  blood cx 5/10 w/ Bacillus cereus in 1/4 bottles  repeat blood cultures 5/11- NGTD     AMS, CVA  c/f possible baclofen toxicity  TTE- no evidence of valvular disease  s/p LP 5/2, cell count not consistent w/ bacterial meningitis, CSF PCR- negative  MRI- Punctate foci of restricted diffusion in the left talya and left cerebellum with associated T2 prolongation consistent with acute infarcts  s/p trach 5/14  noted tentative plan for PEG vs lap assisted vs open G tube 5/17    DVT  CT- Nonocclusive R common iliac vein deep venous thrombosis.    Recommendations  complete 10 day course of vanc for B cereus bacteremia w/ last day 5/20  - vanc level noted today  - give vanc 500mg post HD today  - check random level prior to next HD session    Dr. Bell Wheatley will be covering 5/18-5/19. I will resume care on 5/20.     Steven Torres M.D.  [facility], Division of Infectious Diseases  [phone]  After 5pm on weekdays and all day on weekends - please call [phone]

## 2024-05-17 NOTE — PROGRESS NOTE ADULT - ASSESSMENT
73-year-old male past medical history hypertension, ESRD on dialysis Monday Wednesday Friday, diabetes insulin-dependent presents with hiccups patient endorses persistent hiccups for the last 2 days.   found to have peaked T waves, a new finding. denied dizziness, N/V, denies CP, palps, abd pain  Upon admission seen by CArd, renal and GI  found to have a distended GB prob 2/2 gastroparesis, was started on Reglan  has received 4 doses of baclofen since 4/30 2/2 hiccups, last dose on 5/1 at 5 am  had received Haldol for agitation at 11 pm on 4/30, AMS observed in pm of 5/1  AMS ongoing, RRT x 2 called  now transferred to MICU for encephalopathy requiring  airway protection on 5/2,  intubated for airway protection, was on  propofol and pressors. now off  toxicology consulted upon dx of encephalopathy, not impressed AMS 2/2 Haldol nor baclofen . AMS was 2/2 acute CVA   HD was not done timely until femoral shiley was placed 2/2 clotted RUE AVF. now removed and RIJ shiley placed on 5/3  has been nonverbal since pm 5/1.     MR brain 5/6 c/w L pontine and L cerebellar acute infarcts, no hemorrhage . as per neuro: embolic in nature.   encephalopathy probably 2/2 acute CVA, now follows commands appropriately off sedation.   no SZ focus on EEG,   off CRRT,  HD resumed as per renal.   remains intubated, now trached  off keppra  AC resumed, on Heparin drip for R common iliac DVT  completed 5 days of empiric ZOSYN on 5/7, shortly after became septic again after an extubation trial. bacteremia w B. cereus, on Vanc through 5/20 as per ID    s/p trache placement by pulm,   IR unable to find a window for G-J tube. PEG placed by surgery 5/17, resume feeds when cleared by surgery  HD via R IJ.    leukocytosis resolved  EKG changes on 5/3 c/w NSTEMI loaded w DAPT , was  on Heparin drip x 48 hrs. rpt TTE is unchanged  ID, Neuro , renal, cardiology following.  clotted RUE AVF. fistulogram when medically stable. vascular following   HD via RIght IJ Shiley.   NGT in place in stomach.   LP done, no sign of meningitis.   NEUROLOGY     - CTH without acute findings   - LP done, no acute findings  - MR brain w acute infarcts: L talya and L cerebellum, nonhemorrhagic  - no Sz focus on EEG    CARDIOVASCULAR   - ptn never had CP. just peaked T waves. was awaiting ischemic study w nucl stress test  - TTE showing EF 72%, rpt unchanged  - EKG changes on 5/3, loaded w DAPT now on Heparin drip 2/2 DVT    GI  - PEG placed, npo until cleared by surgery  - Gastroparesis       RENAL   -  HD as per renal  - via R IJ shiley  -  fistula study of RUE  as per VAscular      INFECTIOUS DISEASE     completed a course of Zosyn, then became septic, bacteremic a B. cereus, completed 3 days of Meropenem, now on vanc through 5/20    ENDOCRINE   - DM  - cw ISS    GOC:  Full code

## 2024-05-17 NOTE — PROGRESS NOTE ADULT - ASSESSMENT
73-year-old male past medical history hypertension, ESRD on dialysis Monday Wednesday Friday, diabetes insulin-dependent presents with hiccups patient endorses persistent hiccups for the last 2 days. Nephrology consulted for HD needs.    A/P  ESRD:  Center: [place]  Nephrologist: Dr. Hardwick  Access: R AVF  MWF schedule  Vascular for fistulogram   s/p femoral shiley on 5/1, now removed  s/p placement of IJ shiley 5/3  Stopped CRRT   Restarted IHD  s/p HD 5/7 UF 1778; treatment terminated early due to hypotension   S/P MRA head/neck w/ gadolinium --> Received HD on 5/9 and 5/10.  5/10 Will need to dialyze 3 days consecutively--> plan for HD on 5/11  5/10 Spoke to ICU; will UF net even tomorrow  5/11 shiley removed due to bacteremia; did not receive HD.  Line holiday  5/12 - BUN worsened; K up trending.    5/13 Shiley placed.  5/17: s/p PEG placement today.  Planned for fistulogram but rescheduled for next week.  + swelling of RUE.  HD today w/ UF goal of 1.5L.  Consent obtained and placed in ED chart  Renal diet  Monitor BMP    Hyperkalemia  Improved w/ HD.  C/W HD schedule as above.  Lokelma 10gm PRN for K >5.3 on nonHD days.  Low K diet.  Monitor closely     HTN:  BP fluctuating.  Off pressors.  On carvedilol 12.5mg BID, hydralazine 100mg TID, nifedipine 60mg qd.  UF w/ HD.  Monitor BP.    Anemia:  At goal.  + iron deficiency.  Tsat 13% - on ferrous sulfate 300mg qd via PEG.  Will hold venofer for now in view of up trending leukocytosis.  SILVA w/ HD.  Tranfuse for Hgb <7.  Monitor Hb.    CKD-MBD  Check PTH  PO4 worsened.  Increase Sevelamer 800mg to 1600mg TID.  Low PO4 diet.  Monitor Ca, PO4 daily    Hypocalcemia:  In setting of CKD vs. hypoalbuminemia.  Optimize albumin.  Corrected Ca WNL.  Improving.  Monitor Ca.    D/W family and ACP.

## 2024-05-17 NOTE — PROGRESS NOTE ADULT - SUBJECTIVE AND OBJECTIVE BOX
[facility], Division of Infectious Diseases  AUGUST Carbajal Y. Patel, S. Shah, G. Brian  [phone]  ([phone] - weekdays after 5pm and weekends)    Name: JIMMY BLAND  Age/Gender: 73y Male  MRN: [id number]    Interval History:  Notes reviewed.   Afebrile.   remains intubated  transferred to [facility]    Allergies: No Known Allergies      Objective:  Vitals:   T(F): 98.8 (05-17-24 @ 04:00), Max: 99.1 (05-16-24 @ 14:00)  HR: 66 (05-17-24 @ 07:48) (59 - 75)  BP: 159/48 (05-17-24 @ 04:00) (122/57 - 159/48)  RR: 13 (05-17-24 @ 04:00) (12 - 14)  SpO2: 98% (05-17-24 @ 07:48) (94% - 100%)  Physical Examination:  General: no acute distress  HEENT: anicteric, NGT, tracheostomy, on vent  Lungs: clear to auscultation anteriorly  Heart: S1, S2, normal rate, RIJ shiley  Abdomen: Soft. Nondistended. NT  Extremities: No LE edema.   Skin: Warm. Dry.     Laboratory Studies:  CBC:                       10.3   10.59 )-----------( 315      ( 17 May 2024 06:00 )             29.9     WBC Trend:  10.59 05-17-24 @ 06:00  9.46 05-16-24 @ 16:55  10.87 05-16-24 @ 01:00  9.67 05-15-24 @ 01:15  13.53 05-14-24 @ 00:13  21.36 05-13-24 @ 02:40  31.30 05-12-24 @ 20:28  28.22 05-12-24 @ 03:00  27.31 05-11-24 @ 00:20    CMP: 05-17    136  |  91<L>  |  70<H>  ----------------------------<  127<H>  4.5   |  23  |  6.17<H>    Ca    8.4      17 May 2024 06:00  Phos  6.5     05-17  Mg     2.80     05-17    TPro  6.7  /  Alb  2.8<L>  /  TBili  0.2  /  DBili  <0.2  /  AST  19  /  ALT  10  /  AlkPhos  125<H>  05-17      LIVER FUNCTIONS - ( 17 May 2024 06:00 )  Alb: 2.8 g/dL / Pro: 6.7 g/dL / ALK PHOS: 125 U/L / ALT: 10 U/L / AST: 19 U/L / GGT: x           Vancomycin Level, Random: 23.8 ug/mL (05-17-24 @ 06:00)  Vancomycin Level, Random: 22.3 ug/mL (05-15-24 @ 09:50)  Vancomycin Level, Random: 26.9 ug/mL (05-13-24 @ 13:50)    Urinalysis Basic - ( 17 May 2024 06:00 )    Color: x / Appearance: x / SG: x / pH: x  Gluc: 127 mg/dL / Ketone: x  / Bili: x / Urobili: x   Blood: x / Protein: x / Nitrite: x   Leuk Esterase: x / RBC: x / WBC x   Sq Epi: x / Non Sq Epi: x / Bacteria: x      Microbiology: reviewed     Culture - Acid Fast - Bronchial w/Smear (collected 05-12-24 @ 15:06)  Source: .Bronchial Bronchial Lavage  Preliminary Report (05-15-24 @ 23:09):    Culture is being performed.    Culture - Fungal, Bronchial (collected 05-12-24 @ 15:06)  Source: .Bronchial Bronchial Lavage  Preliminary Report (05-15-24 @ 23:03):    Culture is being performed. Fungal cultures are held for 4 weeks.    Culture - Bronchial (collected 05-12-24 @ 15:06)  Source: .Bronchial Bronchial Lavage  Gram Stain (05-12-24 @ 22:40):    Moderate polymorphonuclear leukocytes per low power field    Rare Squamous epithelial cells per low power field    Rare Gram Positive Cocci in Clusters per oil power field  Final Report (05-14-24 @ 08:10):    Normal Respiratory Jocelyn present    Culture - Blood (collected 05-11-24 @ 14:00)  Source: .Blood Blood-Peripheral  Final Report (05-16-24 @ 19:01):    No growth at 5 days    Culture - Blood (collected 05-11-24 @ 14:00)  Source: .Blood Blood-Peripheral  Final Report (05-16-24 @ 19:01):    No growth at 5 days    Culture - Sputum (collected 05-10-24 @ 16:45)  Source: .Sputum Sputum  Gram Stain (05-10-24 @ 22:57):    Rare polymorphonuclear leukocytes per low power field    Rare Squamous epithelial cells per low power field    Rare Gram Negative Rods per oil power field  Final Report (05-12-24 @ 16:49):    Normal Respiratory Jocelyn present    Culture - Blood (collected 05-10-24 @ 04:00)  Source: .Blood Blood-Peripheral  Final Report (05-15-24 @ 07:01):    No growth at 5 days    Culture - Blood (collected 05-10-24 @ 03:45)  Source: .Blood Blood-Peripheral  Gram Stain (05-10-24 @ 19:34):    Growth in anaerobic bottle: Gram Positive Rods  Final Report (05-11-24 @ 14:47):    Growth in anaerobic bottle: Bacillus cereus "Susceptibilities not    performed"    Direct identification is available within approximately 3-5    hours either by Blood Panel Multiplexed PCR or Direct    MALDI-TOF. Details: [email or web address]  Organism: Blood Culture PCR (05-11-24 @ 14:47)  Organism: Blood Culture PCR (05-11-24 @ 14:47)      Method Type: PCR      -  Blood PCR Panel: NEG    Culture - Sputum (collected 05-08-24 @ 10:05)  Source: ET Tube ET Tube  Gram Stain (05-08-24 @ 20:37):    Few polymorphonuclear leukocytes per low power field    No Squamous epithelial cells per low power field    No organisms seen per oil power field  Final Report (05-10-24 @ 11:45):    Normal Respiratory Jocelyn present    Culture - Sputum (collected 05-04-24 @ 04:10)  Source: ET Tube ET Tube  Gram Stain (05-04-24 @ 13:40):    Few polymorphonuclear leukocytes seen per low power field    No Squamous epithelial cells per low power field    Numerous Gram Positive Rods seen per oil power field    Rare Gram Positive Cocci in Clusters seen per oil power field  Final Report (05-05-24 @ 17:14):    Normal Respiratory Jocelyn present        Radiology: reviewed     Medications:  acetaminophen     Tablet .. 650 milliGRAM(s) Oral every 6 hours PRN  albuterol/ipratropium for Nebulization 3 milliLiter(s) Nebulizer every 12 hours  aluminum hydroxide/magnesium hydroxide/simethicone Suspension 30 milliLiter(s) Oral every 4 hours PRN  aspirin  chewable 81 milliGRAM(s) Oral daily  atorvastatin 20 milliGRAM(s) Oral at bedtime  carvedilol 12.5 milliGRAM(s) Oral every 12 hours  chlorhexidine 0.12% Liquid 15 milliLiter(s) Oral Mucosa every 12 hours  chlorhexidine 2% Cloths 1 Application(s) Topical <User Schedule>  dextrose 10% Bolus 125 milliLiter(s) IV Bolus once  dextrose 5%. 1000 milliLiter(s) IV Continuous <Continuous>  dextrose 5%. 1000 milliLiter(s) IV Continuous <Continuous>  dextrose 50% Injectable 12.5 Gram(s) IV Push once  dextrose 50% Injectable 25 Gram(s) IV Push once  dextrose Oral Gel 15 Gram(s) Oral once PRN  epoetin reilly (EPOGEN) Injectable 72732 Unit(s) IV Push <User Schedule>  glucagon  Injectable 1 milliGRAM(s) IntraMuscular once  heparin  Infusion. 900 Unit(s)/Hr IV Continuous <Continuous>  hydrALAZINE 100 milliGRAM(s) Oral three times a day  insulin lispro (ADMELOG) corrective regimen sliding scale   SubCutaneous every 6 hours  insulin NPH human recombinant 4 Unit(s) SubCutaneous every 6 hours  melatonin 3 milliGRAM(s) Oral at bedtime PRN  ondansetron Injectable 4 milliGRAM(s) IV Push every 8 hours PRN  petrolatum Ophthalmic Ointment 1 Application(s) Both EYES two times a day  sevelamer carbonate Powder 800 milliGRAM(s) Oral every 8 hours  sodium bicarbonate 650 milliGRAM(s) Oral every 8 hours  sodium chloride 0.9% lock flush 10 milliLiter(s) IV Push every 1 hour PRN  sodium chloride 3%  Inhalation 4 milliLiter(s) Inhalation every 12 hours    Antimicrobials:

## 2024-05-17 NOTE — PROGRESS NOTE ADULT - SUBJECTIVE AND OBJECTIVE BOX
Patient is a 73y old  Male who presents with a chief complaint of Unstable angina, abn EKG (17 May 2024 18:05)      SUBJECTIVE / OVERNIGHT EVENTS: PEG placed by surgery, NPO until am, on Heparin drip for nonocclusive R common iliac DVT    MEDICATIONS  (STANDING):  albuterol/ipratropium for Nebulization 3 milliLiter(s) Nebulizer every 12 hours  aspirin  chewable 81 milliGRAM(s) Oral daily  atorvastatin 20 milliGRAM(s) Oral at bedtime  carvedilol 12.5 milliGRAM(s) Oral every 12 hours  chlorhexidine 0.12% Liquid 15 milliLiter(s) Oral Mucosa every 12 hours  chlorhexidine 2% Cloths 1 Application(s) Topical <User Schedule>  dextrose 10% Bolus 125 milliLiter(s) IV Bolus once  dextrose 5%. 1000 milliLiter(s) (100 mL/Hr) IV Continuous <Continuous>  dextrose 5%. 1000 milliLiter(s) (50 mL/Hr) IV Continuous <Continuous>  dextrose 50% Injectable 12.5 Gram(s) IV Push once  dextrose 50% Injectable 25 Gram(s) IV Push once  epoetin reilly (EPOGEN) Injectable 99468 Unit(s) IV Push <User Schedule>  ferrous    sulfate Liquid 300 milliGRAM(s) Enteral Tube daily  glucagon  Injectable 1 milliGRAM(s) IntraMuscular once  heparin  Infusion. 1100 Unit(s)/Hr (11 mL/Hr) IV Continuous <Continuous>  hydrALAZINE 100 milliGRAM(s) Oral three times a day  insulin lispro (ADMELOG) corrective regimen sliding scale   SubCutaneous every 6 hours  insulin NPH human recombinant 4 Unit(s) SubCutaneous every 6 hours  petrolatum Ophthalmic Ointment 1 Application(s) Both EYES two times a day  sevelamer carbonate Powder 1600 milliGRAM(s) Oral every 8 hours  sodium bicarbonate 650 milliGRAM(s) Oral every 8 hours  sodium chloride 3%  Inhalation 4 milliLiter(s) Inhalation every 12 hours    MEDICATIONS  (PRN):  acetaminophen     Tablet .. 650 milliGRAM(s) Oral every 6 hours PRN Temp greater or equal to 38C (100.4F), Mild Pain (1 - 3)  aluminum hydroxide/magnesium hydroxide/simethicone Suspension 30 milliLiter(s) Oral every 4 hours PRN Dyspepsia  dextrose Oral Gel 15 Gram(s) Oral once PRN Blood Glucose LESS THAN 70 milliGRAM(s)/deciliter  melatonin 3 milliGRAM(s) Oral at bedtime PRN Insomnia  ondansetron Injectable 4 milliGRAM(s) IV Push every 8 hours PRN Nausea and/or Vomiting  sodium chloride 0.9% lock flush 10 milliLiter(s) IV Push every 1 hour PRN Pre/post blood products, medications, blood draw, and to maintain line patency      Vital Signs Last 24 Hrs  T(F): 97.8 (05-17-24 @ 20:11), Max: 98.8 (05-17-24 @ 04:00)  HR: 70 (05-17-24 @ 20:11) (59 - 77)  BP: 139/62 (05-17-24 @ 20:11) (139/62 - 174/54)  RR: 18 (05-17-24 @ 20:11) (12 - 18)  SpO2: 100% (05-17-24 @ 20:11) (94% - 100%)  Telemetry:   CAPILLARY BLOOD GLUCOSE      POCT Blood Glucose.: 126 mg/dL (17 May 2024 17:13)  POCT Blood Glucose.: 140 mg/dL (17 May 2024 12:10)  POCT Blood Glucose.: 135 mg/dL (17 May 2024 05:33)  POCT Blood Glucose.: 227 mg/dL (16 May 2024 23:47)    I&O's Summary    16 May 2024 07:01  -  17 May 2024 07:00  --------------------------------------------------------  IN: 178 mL / OUT: 0 mL / NET: 178 mL    17 May 2024 07:01  -  17 May 2024 22:09  --------------------------------------------------------  IN: 400 mL / OUT: 1900 mL / NET: -1500 mL        PHYSICAL EXAM:  GENERAL: NAD, well-developed  HEAD:  Atraumatic, Normocephalic  EYES: EOMI, PERRLA, conjunctiva and sclera clear  NECK: Supple, No JVD  CHEST/LUNG: Clear to auscultation bilaterally; No wheeze  HEART: Regular rate and rhythm; No murmurs, rubs, or gallops  ABDOMEN: Soft, Nontender, Nondistended; Bowel sounds present  EXTREMITIES:  2+ Peripheral Pulses, No clubbing, cyanosis, or edema  PSYCH: AAOx3  NEUROLOGY: non-focal  SKIN: No rashes or lesions    LABS:                        10.3   10.59 )-----------( 315      ( 17 May 2024 06:00 )             29.9     05-17    136  |  91<L>  |  70<H>  ----------------------------<  127<H>  4.5   |  23  |  6.17<H>    Ca    8.4      17 May 2024 06:00  Phos  6.5     05-17  Mg     2.80     05-17    TPro  6.7  /  Alb  2.8<L>  /  TBili  0.2  /  DBili  <0.2  /  AST  19  /  ALT  10  /  AlkPhos  125<H>  05-17    PT/INR - ( 16 May 2024 01:00 )   PT: 13.5 sec;   INR: 1.21 ratio         PTT - ( 16 May 2024 16:55 )  PTT:31.3 sec      Urinalysis Basic - ( 17 May 2024 06:00 )    Color: x / Appearance: x / SG: x / pH: x  Gluc: 127 mg/dL / Ketone: x  / Bili: x / Urobili: x   Blood: x / Protein: x / Nitrite: x   Leuk Esterase: x / RBC: x / WBC x   Sq Epi: x / Non Sq Epi: x / Bacteria: x        RADIOLOGY & ADDITIONAL TESTS:    Imaging Personally Reviewed:    Consultant(s) Notes Reviewed:      Care Discussed with Consultants/Other Providers:

## 2024-05-17 NOTE — CHART NOTE - NSCHARTNOTEFT_GEN_A_CORE
Pt seen for SEVERE MALNUTRITION FOLLOW UP     Medical Course:  72 y/o male w/ PMHx hypertension, ESRD on HD via R radiocephalic fistula (MWF), DM, HTN, p/w hiccups and peaked T waves, admitted to MICU for c/f baclofen toxicity. Patient received 1x HD on admission. R femoral shiley removed 5/2, had 2 RRT for AMS and failed HD via AVF 5/3. S/p emergent R IJ shiley placement 5/3, started CRRT which is now transitioned back to iHD. Vascular planning RUE fistulogram when medically optimized. Extubated to HFNC then reintubated 5/12 for c/f aspiration w/ hypoxic resp failure. S/p trach 5/14. Downgraded from MICU to RCU 5/16.      Nutrition Course:   Unable to conduct nutrition interview 2/2 decreased cognition. Information obtained from comprehensive chart review and per RN today:  No recent episodes of nausea, vomiting, diarrhea, or constipation. Last BM noted 5/16 per RN flowsheets. Pt remains NPO TF only as sole source of nutrition and hydration- Nepro @ 45ml/hr x24hrs (1080ml total vol, 1944 kcal (38kcal/kg), 87 gm protein (1.7gm/kg), 788ml free water from formula). Pt originally ordered for nepro @ 45ml/hr x18hrs, however current TF provisions better meet Pt's needs for HD and wt gain. Recommend continue as same. Will recommend d/c LPS 3x/day as it far exceeds protein needs based on ABW at this time.       Diet Prescription: Diet, NPO with Tube Feed:   Tube Feeding Modality: Gastrostomy  Nepro with Carb Steady (NEPRORTH)  Total Volume for 24 Hours (mL): 1080  Continuous  Starting Tube Feed Rate {mL per Hour}: 45  Until Goal Tube Feed Rate (mL per Hour): 45  Tube Feed Duration (in Hours): 24  Tube Feed Start Time: 19:30  Liquid Protein Supplement     Qty per Day:  3 (05-16-24 @ 19:28)  Diet, NPO after Midnight:      NPO Start Date: 16-May-2024,   NPO Start Time: 23:59  Except Medications (05-16-24 @ 18:13)    Pertinent Medications: MEDICATIONS  (STANDING):  albuterol/ipratropium for Nebulization 3 milliLiter(s) Nebulizer every 12 hours  aspirin  chewable 81 milliGRAM(s) Oral daily  atorvastatin 20 milliGRAM(s) Oral at bedtime  carvedilol 12.5 milliGRAM(s) Oral every 12 hours  chlorhexidine 0.12% Liquid 15 milliLiter(s) Oral Mucosa every 12 hours  chlorhexidine 2% Cloths 1 Application(s) Topical <User Schedule>  dextrose 10% Bolus 125 milliLiter(s) IV Bolus once  dextrose 5%. 1000 milliLiter(s) (50 mL/Hr) IV Continuous <Continuous>  dextrose 5%. 1000 milliLiter(s) (100 mL/Hr) IV Continuous <Continuous>  dextrose 50% Injectable 12.5 Gram(s) IV Push once  dextrose 50% Injectable 25 Gram(s) IV Push once  epoetin reilly (EPOGEN) Injectable 66366 Unit(s) IV Push <User Schedule>  ferrous    sulfate Liquid 300 milliGRAM(s) Enteral Tube daily  glucagon  Injectable 1 milliGRAM(s) IntraMuscular once  heparin  Infusion. 900 Unit(s)/Hr (9 mL/Hr) IV Continuous <Continuous>  hydrALAZINE 100 milliGRAM(s) Oral three times a day  insulin lispro (ADMELOG) corrective regimen sliding scale   SubCutaneous every 6 hours  insulin NPH human recombinant 4 Unit(s) SubCutaneous every 6 hours  petrolatum Ophthalmic Ointment 1 Application(s) Both EYES two times a day  sevelamer carbonate Powder 800 milliGRAM(s) Oral every 8 hours  sodium bicarbonate 650 milliGRAM(s) Oral every 8 hours  sodium chloride 3%  Inhalation 4 milliLiter(s) Inhalation every 12 hours    MEDICATIONS  (PRN):  acetaminophen     Tablet .. 650 milliGRAM(s) Oral every 6 hours PRN Temp greater or equal to 38C (100.4F), Mild Pain (1 - 3)  aluminum hydroxide/magnesium hydroxide/simethicone Suspension 30 milliLiter(s) Oral every 4 hours PRN Dyspepsia  dextrose Oral Gel 15 Gram(s) Oral once PRN Blood Glucose LESS THAN 70 milliGRAM(s)/deciliter  fentaNYL    Injectable 50 MICROGram(s) IV Push once PRN Severe Pain (7 - 10)  fentaNYL    Injectable 25 MICROGram(s) IV Push every 5 minutes PRN Moderate Pain (4 - 6)  melatonin 3 milliGRAM(s) Oral at bedtime PRN Insomnia  ondansetron Injectable 4 milliGRAM(s) IV Push every 8 hours PRN Nausea and/or Vomiting  ondansetron Injectable 4 milliGRAM(s) IV Push once PRN Nausea and/or Vomiting  sodium chloride 0.9% lock flush 10 milliLiter(s) IV Push every 1 hour PRN Pre/post blood products, medications, blood draw, and to maintain line patency    Pertinent Labs: 05-17 Na136 mmol/L Glu 127 mg/dL<H> K+ 4.5 mmol/L Cr  6.17 mg/dL<H> BUN 70 mg/dL<H> 05-17 Phos 6.5 mg/dL<H> 05-17 Alb 2.8 g/dL<L> 05-17 Chol 86 mg/dL LDL --    HDL 27 mg/dL<L> Trig 151 mg/dL<H>    POCT Blood Glucose.: 140 mg/dL (17 May 2024 12:10)  POCT Blood Glucose.: 135 mg/dL (17 May 2024 05:33)  POCT Blood Glucose.: 227 mg/dL (16 May 2024 23:47)  POCT Blood Glucose.: 208 mg/dL (16 May 2024 17:09)  POCT Blood Glucose.: 212 mg/dL (16 May 2024 13:54)      Weight: Height (cm): 172.7 (04-29 @ 23:49)  Weight (kg): 50.5 (05-16), 52.617 (04-29 @ 23:49)  BMI (kg/m2): 17.6 (04-29 @ 23:49)  Weight Assessment: Pt noted with significant wt loss ~2.5 weeks (-3.8%), possibly r/t fluid shifts as pt was noted with generalized 1+ edema and no now longer has edema      Physical Assessment, per flowsheets:  Edema: none noted  Skin: intact         Estimated Needs:   [X] No change since previous assessment      Previous Nutrition Diagnosis:  [x ] Malnutrition   Nutrition Diagnosis is [ x] ongoing  [ ] resolved [ ] not applicable   New Nutrition Diagnosis: [ x] not applicable     Education:  [  ] Given today        Type of education provided:   [  ] Given on previous assessment by RD   [ x ] Not applicable 2/2 cognitive deficit  [  ] Pt refusal of education offered   [  ] Not applicable 2/2 current prognosis  [  ] Not warranted at present    Interventions:   1) Continue on current TF regimen: Nepro @ 45ml/hr x24hrs  2) Recommend D/c LPS supplement at this time as Pt no longer needs  3) RD to f/u prn     Monitor & Evaluate:  PO intake, tolerance to diet/supplement, nutrition related lab values, weight trends, BMs/GI distress, hydration status, skin integrity.    Emerald Krishnan MS, RDN (Pager #[phone]) | Also available on TEAMS

## 2024-05-17 NOTE — PROGRESS NOTE ADULT - SUBJECTIVE AND OBJECTIVE BOX
[facility]   MD ELIANE BHAGAT MD ANGELA WONG, ROBBIN GREEN, NP    TEL:  OFFICE: [phone]  From 5pm-7am Answering Service [phone]    -- RENAL FOLLOW UP NOTE ---Date of Service 05-17-24 @ 18:06    Patient is a 73y old  Male who presents with a chief complaint of Unstable angina, abn EKG.    Patient seen and examined at bedside. No chest pain/SOB.    VITALS:  T(F): 98.8 (05-17-24 @ 16:45), Max: 98.8 (05-16-24 @ 20:00)  HR: 65 (05-17-24 @ 17:20)  BP: 144/64 (05-17-24 @ 16:45)  RR: 18 (05-17-24 @ 16:45)  SpO2: 100% (05-17-24 @ 17:20)  Wt(kg): --    05-16 @ 07:01  -  05-17 @ 07:00  --------------------------------------------------------  IN: 178 mL / OUT: 0 mL / NET: 178 mL    05-17 @ 07:01  -  05-17 @ 18:06  --------------------------------------------------------  IN: 0 mL / OUT: 0 mL / NET: 0 mL      PHYSICAL EXAM:  General: NAD  Neck: No JVD  Respiratory: CTAB, no wheezes, rales or rhonchi.  Trached to vent; equal rise and fall of chest.  No labored breathing.  Cardiovascular: S1, S2, RRR  Gastrointestinal: BS+, soft, NT/ND  Extremities: + swelling of RUE.    Hospital Medications:   MEDICATIONS  (STANDING):  albuterol/ipratropium for Nebulization 3 milliLiter(s) Nebulizer every 12 hours  aspirin  chewable 81 milliGRAM(s) Oral daily  atorvastatin 20 milliGRAM(s) Oral at bedtime  carvedilol 12.5 milliGRAM(s) Oral every 12 hours  chlorhexidine 0.12% Liquid 15 milliLiter(s) Oral Mucosa every 12 hours  chlorhexidine 2% Cloths 1 Application(s) Topical <User Schedule>  dextrose 10% Bolus 125 milliLiter(s) IV Bolus once  dextrose 5%. 1000 milliLiter(s) (50 mL/Hr) IV Continuous <Continuous>  dextrose 5%. 1000 milliLiter(s) (100 mL/Hr) IV Continuous <Continuous>  dextrose 50% Injectable 12.5 Gram(s) IV Push once  dextrose 50% Injectable 25 Gram(s) IV Push once  epoetin reilly (EPOGEN) Injectable 09607 Unit(s) IV Push <User Schedule>  ferrous    sulfate Liquid 300 milliGRAM(s) Enteral Tube daily  glucagon  Injectable 1 milliGRAM(s) IntraMuscular once  heparin  Infusion. 1100 Unit(s)/Hr (11 mL/Hr) IV Continuous <Continuous>  hydrALAZINE 100 milliGRAM(s) Oral three times a day  insulin lispro (ADMELOG) corrective regimen sliding scale   SubCutaneous every 6 hours  insulin NPH human recombinant 4 Unit(s) SubCutaneous every 6 hours  petrolatum Ophthalmic Ointment 1 Application(s) Both EYES two times a day  sevelamer carbonate Powder 800 milliGRAM(s) Oral every 8 hours  sodium bicarbonate 650 milliGRAM(s) Oral every 8 hours  sodium chloride 3%  Inhalation 4 milliLiter(s) Inhalation every 12 hours      LABS:  05-17    136  |  91<L>  |  70<H>  ----------------------------<  127<H>  4.5   |  23  |  6.17<H>    Ca    8.4      17 May 2024 06:00  Phos  6.5     05-17  Mg     2.80     05-17    TPro  6.7  /  Alb  2.8<L>  /  TBili  0.2  /  DBili  <0.2  /  AST  19  /  ALT  10  /  AlkPhos  125<H>  05-17    Creatinine Trend: 6.17 <--, 4.55 <--, 6.91 <--, 5.83 <--, 8.00 <--, 7.97 <--, 6.68 <--, 4.24 <--    Phosphorus: 6.5 mg/dL (05-17 @ 06:00)  Albumin: 2.8 g/dL (05-17 @ 06:00)  Iron Total: 16 ug/dL (05-17 @ 06:00)  Iron - Total Binding Capacity.: 121 ug/dL (05-17 @ 06:00)  Ferritin: 720 ng/mL (05-17 @ 06:00)                          10.3   10.59 )-----------( 315      ( 17 May 2024 06:00 )             29.9     Urine Studies:  Urinalysis - [05-17-24 @ 06:00]      Color  / Appearance  / SG  / pH       Gluc 127 / Ketone   / Bili  / Urobili        Blood  / Protein  / Leuk Est  / Nitrite       RBC  / WBC  / Hyaline  / Gran  / Sq Epi  / Non Sq Epi  / Bacteria       Iron 16, TIBC 121, %sat 13      [05-17-24 @ 06:00]  Ferritin 720      [05-17-24 @ 06:00]  TSH 2.60      [05-17-24 @ 06:00]  Lipid: chol 86, , HDL 27, LDL --      [05-17-24 @ 06:00]    HBsAb <3.0      [05-01-24 @ 14:42]  HBcAb Nonreact      [05-01-24 @ 14:42]

## 2024-05-17 NOTE — PROGRESS NOTE ADULT - SUBJECTIVE AND OBJECTIVE BOX
Cardiovascular Disease Progress Note  Date of service: 05-17-24 @ 10:15    Overnight events: No acute events overnight.  Patient is in no distress. Trache in place.   Otherwise review of systems negative    Objective Findings:  T(C): 37.1 (05-17-24 @ 04:00), Max: 37.3 (05-16-24 @ 14:00)  HR: 66 (05-17-24 @ 07:48) (59 - 75)  BP: 159/48 (05-17-24 @ 04:00) (122/57 - 159/48)  RR: 13 (05-17-24 @ 04:00) (12 - 14)  SpO2: 98% (05-17-24 @ 07:48) (94% - 100%)  Wt(kg): --  Daily     Daily       Physical Exam:  Gen: NAD; Patient resting comfortably  HEENT: EOMI, Normocephalic/ atraumatic  CV: RRR, normal S1 + S2, no m/r/g  Lungs:  Trach  Abd: soft, non-tender; bowel sounds present  Ext: No edema; warm and well perfused    Telemetry: Sinus     Laboratory Data:                        10.3   10.59 )-----------( 315      ( 17 May 2024 06:00 )             29.9     05-17    136  |  91<L>  |  70<H>  ----------------------------<  127<H>  4.5   |  23  |  6.17<H>    Ca    8.4      17 May 2024 06:00  Phos  6.5     05-17  Mg     2.80     05-17    TPro  6.7  /  Alb  2.8<L>  /  TBili  0.2  /  DBili  <0.2  /  AST  19  /  ALT  10  /  AlkPhos  125<H>  05-17    PT/INR - ( 16 May 2024 01:00 )   PT: 13.5 sec;   INR: 1.21 ratio         PTT - ( 16 May 2024 16:55 )  PTT:31.3 sec          Inpatient Medications:  MEDICATIONS  (STANDING):  albuterol/ipratropium for Nebulization 3 milliLiter(s) Nebulizer every 12 hours  aspirin  chewable 81 milliGRAM(s) Oral daily  atorvastatin 20 milliGRAM(s) Oral at bedtime  carvedilol 12.5 milliGRAM(s) Oral every 12 hours  chlorhexidine 0.12% Liquid 15 milliLiter(s) Oral Mucosa every 12 hours  chlorhexidine 2% Cloths 1 Application(s) Topical <User Schedule>  dextrose 10% Bolus 125 milliLiter(s) IV Bolus once  dextrose 5%. 1000 milliLiter(s) (50 mL/Hr) IV Continuous <Continuous>  dextrose 5%. 1000 milliLiter(s) (100 mL/Hr) IV Continuous <Continuous>  dextrose 50% Injectable 12.5 Gram(s) IV Push once  dextrose 50% Injectable 25 Gram(s) IV Push once  epoetin reilly (EPOGEN) Injectable 16349 Unit(s) IV Push <User Schedule>  ferrous    sulfate Liquid 300 milliGRAM(s) Enteral Tube daily  glucagon  Injectable 1 milliGRAM(s) IntraMuscular once  heparin  Infusion. 900 Unit(s)/Hr (9 mL/Hr) IV Continuous <Continuous>  hydrALAZINE 100 milliGRAM(s) Oral three times a day  insulin lispro (ADMELOG) corrective regimen sliding scale   SubCutaneous every 6 hours  insulin NPH human recombinant 4 Unit(s) SubCutaneous every 6 hours  petrolatum Ophthalmic Ointment 1 Application(s) Both EYES two times a day  sevelamer carbonate Powder 800 milliGRAM(s) Oral every 8 hours  sodium bicarbonate 650 milliGRAM(s) Oral every 8 hours  sodium chloride 3%  Inhalation 4 milliLiter(s) Inhalation every 12 hours      Assessment: 73 year old man with HTN, HLD, T2DM on insulin, and ESRD on HD presents with supply demand ischemia and angina.    Plan of Care:    #Type II myocardial infarction-  Secondary to distributive shock earlier on admission.   The repeat echo shows no new wall motion abnormality.   I would not pursue cath at this time given recurrent aspiration and chronic respiratory failure s/p trach 5/14.   The patient is optimized from a cardiac standpoint to proceed with fistulogram and PEG placement.        #Sinus bradycardia-  No evidence of AV block on admission EKG or telemetry.  No indication for pacing at this time.     #HTN-  BP labile.  Monitor trends.     #ESRD-  Defer to renal     #DVT   - R common Iliac DVT  - Hep GTT  - AC management as per vascular team.          Over 55 minutes spent on total encounter; more than 50% of the visit was spent counseling and/or coordinating care by the attending physician.      Tico Bravo DO [facility]  Cardiovascular Disease  [phone] Cardiovascular Disease Progress Note  Date of service: 05-17-24 @ 10:15    Overnight events: No acute events overnight.  Patient is in no distress. Trache in place.   Otherwise review of systems negative    Objective Findings:  T(C): 37.1 (05-17-24 @ 04:00), Max: 37.3 (05-16-24 @ 14:00)  HR: 66 (05-17-24 @ 07:48) (59 - 75)  BP: 159/48 (05-17-24 @ 04:00) (122/57 - 159/48)  RR: 13 (05-17-24 @ 04:00) (12 - 14)  SpO2: 98% (05-17-24 @ 07:48) (94% - 100%)  Wt(kg): --  Daily     Daily       Physical Exam:  Gen: NAD; Patient resting comfortably  HEENT: EOMI, Normocephalic/ atraumatic  CV: RRR, normal S1 + S2, no m/r/g  Lungs:  Trach  Abd: soft, non-tender; bowel sounds present  Ext: No edema; warm and well perfused    Telemetry: Sinus     Laboratory Data:                        10.3   10.59 )-----------( 315      ( 17 May 2024 06:00 )             29.9     05-17    136  |  91<L>  |  70<H>  ----------------------------<  127<H>  4.5   |  23  |  6.17<H>    Ca    8.4      17 May 2024 06:00  Phos  6.5     05-17  Mg     2.80     05-17    TPro  6.7  /  Alb  2.8<L>  /  TBili  0.2  /  DBili  <0.2  /  AST  19  /  ALT  10  /  AlkPhos  125<H>  05-17    PT/INR - ( 16 May 2024 01:00 )   PT: 13.5 sec;   INR: 1.21 ratio         PTT - ( 16 May 2024 16:55 )  PTT:31.3 sec          Inpatient Medications:  MEDICATIONS  (STANDING):  albuterol/ipratropium for Nebulization 3 milliLiter(s) Nebulizer every 12 hours  aspirin  chewable 81 milliGRAM(s) Oral daily  atorvastatin 20 milliGRAM(s) Oral at bedtime  carvedilol 12.5 milliGRAM(s) Oral every 12 hours  chlorhexidine 0.12% Liquid 15 milliLiter(s) Oral Mucosa every 12 hours  chlorhexidine 2% Cloths 1 Application(s) Topical <User Schedule>  dextrose 10% Bolus 125 milliLiter(s) IV Bolus once  dextrose 5%. 1000 milliLiter(s) (50 mL/Hr) IV Continuous <Continuous>  dextrose 5%. 1000 milliLiter(s) (100 mL/Hr) IV Continuous <Continuous>  dextrose 50% Injectable 12.5 Gram(s) IV Push once  dextrose 50% Injectable 25 Gram(s) IV Push once  epoetin reilly (EPOGEN) Injectable 93357 Unit(s) IV Push <User Schedule>  ferrous    sulfate Liquid 300 milliGRAM(s) Enteral Tube daily  glucagon  Injectable 1 milliGRAM(s) IntraMuscular once  heparin  Infusion. 900 Unit(s)/Hr (9 mL/Hr) IV Continuous <Continuous>  hydrALAZINE 100 milliGRAM(s) Oral three times a day  insulin lispro (ADMELOG) corrective regimen sliding scale   SubCutaneous every 6 hours  insulin NPH human recombinant 4 Unit(s) SubCutaneous every 6 hours  petrolatum Ophthalmic Ointment 1 Application(s) Both EYES two times a day  sevelamer carbonate Powder 800 milliGRAM(s) Oral every 8 hours  sodium bicarbonate 650 milliGRAM(s) Oral every 8 hours  sodium chloride 3%  Inhalation 4 milliLiter(s) Inhalation every 12 hours      Assessment: 73 year old man with HTN, HLD, T2DM on insulin, and ESRD on HD presents with supply demand ischemia and angina.    Plan of Care:    #Type II myocardial infarction-  Secondary to distributive shock earlier on admission.   The repeat echo shows no new wall motion abnormality.   I would not pursue cath at this time given recurrent aspiration and chronic respiratory failure s/p trach 5/14.   The patient is optimized from a cardiac standpoint to proceed with fistulogram and PEG placement.        #Sinus bradycardia-  No evidence of AV block on admission EKG or telemetry.  No indication for pacing at this time.     #HTN-  BP labile.  Monitor trends.     #ESRD-  Defer to renal     #DVT   - R common Iliac DVT  - Hep GTT  - AC management as per vascular team.      High and complex medical decision making in this patient with active comorbidities.     Over 55 minutes spent on total encounter; more than 50% of the visit was spent counseling and/or coordinating care by the attending physician.      Tico Bravo DO Located within [facility]  Cardiovascular Disease  [phone]

## 2024-05-17 NOTE — PROGRESS NOTE ADULT - SUBJECTIVE AND OBJECTIVE BOX
Neurology Progress Note    S: Patient seen and examined. Peg planned for today    Medications: MEDICATIONS  (STANDING):  albuterol/ipratropium for Nebulization 3 milliLiter(s) Nebulizer every 12 hours  aspirin  chewable 81 milliGRAM(s) Oral daily  atorvastatin 20 milliGRAM(s) Oral at bedtime  carvedilol 12.5 milliGRAM(s) Oral every 12 hours  chlorhexidine 0.12% Liquid 15 milliLiter(s) Oral Mucosa every 12 hours  chlorhexidine 2% Cloths 1 Application(s) Topical <User Schedule>  dextrose 10% Bolus 125 milliLiter(s) IV Bolus once  dextrose 5%. 1000 milliLiter(s) (50 mL/Hr) IV Continuous <Continuous>  dextrose 5%. 1000 milliLiter(s) (100 mL/Hr) IV Continuous <Continuous>  dextrose 50% Injectable 12.5 Gram(s) IV Push once  dextrose 50% Injectable 25 Gram(s) IV Push once  epoetin reilly (EPOGEN) Injectable 56618 Unit(s) IV Push <User Schedule>  ferrous    sulfate Liquid 300 milliGRAM(s) Enteral Tube daily  glucagon  Injectable 1 milliGRAM(s) IntraMuscular once  heparin  Infusion. 1100 Unit(s)/Hr (11 mL/Hr) IV Continuous <Continuous>  hydrALAZINE 100 milliGRAM(s) Oral three times a day  insulin lispro (ADMELOG) corrective regimen sliding scale   SubCutaneous every 6 hours  insulin NPH human recombinant 4 Unit(s) SubCutaneous every 6 hours  petrolatum Ophthalmic Ointment 1 Application(s) Both EYES two times a day  sevelamer carbonate Powder 800 milliGRAM(s) Oral every 8 hours  sodium bicarbonate 650 milliGRAM(s) Oral every 8 hours  sodium chloride 3%  Inhalation 4 milliLiter(s) Inhalation every 12 hours    MEDICATIONS  (PRN):  acetaminophen     Tablet .. 650 milliGRAM(s) Oral every 6 hours PRN Temp greater or equal to 38C (100.4F), Mild Pain (1 - 3)  aluminum hydroxide/magnesium hydroxide/simethicone Suspension 30 milliLiter(s) Oral every 4 hours PRN Dyspepsia  dextrose Oral Gel 15 Gram(s) Oral once PRN Blood Glucose LESS THAN 70 milliGRAM(s)/deciliter  melatonin 3 milliGRAM(s) Oral at bedtime PRN Insomnia  ondansetron Injectable 4 milliGRAM(s) IV Push every 8 hours PRN Nausea and/or Vomiting  sodium chloride 0.9% lock flush 10 milliLiter(s) IV Push every 1 hour PRN Pre/post blood products, medications, blood draw, and to maintain line patency       Vitals:  Vital Signs Last 24 Hrs  T(C): 36.6 (17 May 2024 14:00), Max: 37.1 (16 May 2024 20:00)  T(F): 97.9 (17 May 2024 14:00), Max: 98.8 (16 May 2024 20:00)  HR: 63 (17 May 2024 14:34) (59 - 75)  BP: 161/50 (17 May 2024 14:00) (126/64 - 174/54)  BP(mean): 79 (17 May 2024 14:00) (79 - 85)  RR: 12 (17 May 2024 14:00) (12 - 13)  SpO2: 99% (17 May 2024 14:34) (94% - 100%)    Parameters below as of 17 May 2024 14:00  Patient On (Oxygen Delivery Method): ventilator    O2 Concentration (%): 40          General Exam:   General Appearance: + trach  Head: Normocephalic, atraumatic and no dysmorphic features  Ear, Nose, and Throat: Moist mucous membranes  CVS: S1S2+  Resp: mechanical  Abd: soft NTND  Extremities: No edema, no cyanosis  Skin: No bruises, no rashes     Neurological Exam:  Mental Status: Opens to voice, tracks minimally, follows simple commands.    Cranial Nerves: pupils 1-2mm, no facial asymmetry  Motor: slight inc tone throughout, uppers > lowers antigravity   Sensation:  intact      I personally reviewed the below data/images/labs:    LABS:                          10.3   10.59 )-----------( 315      ( 17 May 2024 06:00 )             29.9     05-17    136  |  91<L>  |  70<H>  ----------------------------<  127<H>  4.5   |  23  |  6.17<H>    Ca    8.4      17 May 2024 06:00  Phos  6.5     05-17  Mg     2.80     05-17    TPro  6.7  /  Alb  2.8<L>  /  TBili  0.2  /  DBili  <0.2  /  AST  19  /  ALT  10  /  AlkPhos  125<H>  05-17    LIVER FUNCTIONS - ( 17 May 2024 06:00 )  Alb: 2.8 g/dL / Pro: 6.7 g/dL / ALK PHOS: 125 U/L / ALT: 10 U/L / AST: 19 U/L / GGT: x           PT/INR - ( 16 May 2024 01:00 )   PT: 13.5 sec;   INR: 1.21 ratio         PTT - ( 16 May 2024 16:55 )  PTT:31.3 sec  Urinalysis Basic - ( 17 May 2024 06:00 )    Color: x / Appearance: x / SG: x / pH: x  Gluc: 127 mg/dL / Ketone: x  / Bili: x / Urobili: x   Blood: x / Protein: x / Nitrite: x   Leuk Esterase: x / RBC: x / WBC x   Sq Epi: x / Non Sq Epi: x / Bacteria: x                  CT Head No Cont:  (01 May 2024 18:11)  < from: CT Head No Cont (05.01.24 @ 18:11) >    ACC: [id number] EXAM:  CT BRAIN   ORDERED BY: SHELBY MOREL     PROCEDURE DATE:  05/01/2024          INTERPRETATION:  CT OF THE HEAD WITHOUT CONTRAST    CLINICAL INDICATION: Lethargy.    TECHNIQUE: Volumetric CT acquisition was performed through the brain and   reviewed using brain and bone window technique.      COMPARISON: CT head from 1/17/2024    FINDINGS:    The ventricular and sulcal size and configuration is age appropriate.     There is no acute loss of gray-white differentiation. Stable bilateral   inferior frontal encephalomalacia and gliosis likely related to remote   trauma.    There is no evidence of mass effect, midline shift, acute intracranial   hemorrhage, or extra-axial collections.     The calvarium is intact. The paranasalsinuses are clear.The mastoid air   cells are predominantly clear. The orbits are unremarkable.      IMPRESSION:  No acute intracranial hemorrhage or acute territorial infarction. Chronic   findings as above.    --- End of Report ---          GILDARDO KHAN; Resident Radiologist  This document has been electronically signed.  LADARIUS DENNY MD; Attending Radiologist  This document has been electronically signed. May  1 2024  7:54PM    < end of copied text >      EEG Classification / Summary:  Abnormal EEG in the awake, drowsy states.   -Generalized sharp waves with triphasic morphology, initially appearing as frequent GPDs at 1-1.5 Hz, decreasing in prevalence later in recording.  -Variable moderate to mild diffuse slowing.  -No electrographic seizures are captured.     Clinical Impression:  -Generalized sharp waves with triphasic morphology can be seen in toxic-metabolic encephalopathies and indicate some risk of generalized seizures, especially when at higher frequencies than in this study. These decrease in prevalence over the course of recording.  -Variable moderate to mild diffuse cerebral dysfunction is nonspecific in etiology.   -No seizures x2 days of recording.    m< from: MR Head w/wo IV Cont (05.06.24 @ 18:10) >    ACC: [id number] EXAM:  MR BRAIN WAW IC   ORDERED BY: DOLORES QUICK     PROCEDURE DATE:  05/06/2024          INTERPRETATION:  CLINICAL INDICATION: Altered mental status.    TECHNIQUE: Multi-planar, multi-sequence magnetic resonance imaging of the   brain was performed with and without intravenous contrast.    CONTRAST: Post-contrast MR images were obtained following infusion of 5   mL of Gadavist    COMPARISON: No prior studies are available for comparison    FINDINGS:    VENTRICLES AND SULCI:  Normal.  INTRA-AXIAL: Punctate foci of restricted diffusion in the left talya and   left cerebellum with associated T2 prolongation consistent with acute   infarcts. No acute intracranial hemorrhage. Encephalomalacia/gliosis   noted in the inferior frontal lobes. No abnormal enhancement. There are   patchy and confluent foci of hyperintense T2 signal within the   subcortical and periventricular white matter which are nonspecific but   likely related to chronic microvascular ischemic disease.  EXTRA-AXIAL:  No mass or collection.  VISUALIZED SINUSES: Mild polypoid mucosal thickening. Fluid noted in the   nasopharynx.  VISUALIZED MASTOIDS:  Clear.  CALVARIUM:  Normal.  CAROTID FLOW VOIDS: Normal.  MISCELLANEOUS:  None.    IMPRESSION: PUNCTATE FOCI OF RESTRICTED DIFFUSION IN THE LEFT TALYA AND   LEFT CEREBELLUM WITH ASSOCIATED T2 PROLONGATION CONSISTENT WITH ACUTE   INFARCTS. NO ACUTE INTRACRANIAL HEMORRHAGE. NO AREA OF ABNORMAL   ENHANCEMENT.    < end of copied text >    m< from: MR Angio Head No Cont (05.09.24 @ 15:58) >    ACC: [id number] EXAM:  MR ANGIO NECK WAW IC   ORDERED BY: OMAR NESBITT     ACC: [id number] EXAM:  MR ANGIO BRAIN   ORDERED BY: OMAR NESBITT     PROCEDURE DATE:  05/09/2024          INTERPRETATION:  .    CLINICAL INFORMATION: Stroke workup. Talya/cerebellar stroke.    TECHNIQUE: MRA images through the neck and [place] were obtained   using a combination of 2-D and 3-D time-of-flight acquisition. Post   contrast MR angiography of the neck was also performed. The data was then   reformatted intoa volumetric data set and reviewed as rotational MIP   images. 5 cc's of IV Gadavist was administered without immediate   complication. 2.5 cc's was discarded.    COMPARISON: Prior head CT exam dated 5/1/2024.    FINDINGS:    MRA Neck: There is a standard anatomic configuration to the aortic arch.    The origins of the great vessels appear unremarkable. The bilateral   common carotid arteries and carotid bifurcations appear unremarkable.    The bilateral cervical internal carotid arteries are within normal limits.    The origins of the bilateral vertebral arteries are normal. The bilateral   cervical vertebral arteries are normal in course and caliber.    MRA [place]: The bilateral intracranial internal carotid,   anterior, and middle cerebral arteries appear unremarkable.    The anterior and bilateral posterior communicating arteries are notable.    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 Otherwise, the   bilateral intradural vertebral arteries, vertebrobasilar junction,   basilar artery, and basilar tip appear unremarkable as well as the   bilateral posterior cerebral arteries.    IMPRESSION: No large vessel occlusion or stenosis.    < end of copied text >

## 2024-05-17 NOTE — PROGRESS NOTE ADULT - ASSESSMENT
72y/o M w/ PMHx HTN, ESRD on dialysis MWF, DM, presented with hiccups x2days, found to have peaked T waves. S/p RRT for AMS x2 5/1 and was intubated for airway protection in setting of possible baclofen overdose. Hospital course c/b L pontine and cerebellar infarcts (MRI 5/6), concern for ACS s/p DAPT load and heparin gtt x48hrs and acute hypoxic resp failure s/p extubation 5/9 and re-intubation 5/12, s/p trach 5/14. Patient also on heparin gtt for nonocclusive R common iliac vein thrombus. General surgery consulted for G tube placement.    Plan:  - NPO  - Cont to hold hep Gtt  - OR today for PEG vs Lap assisted vs open G tube  - please do not send pt to HD until after OR    B Team  [id number]

## 2024-05-17 NOTE — PROGRESS NOTE ADULT - ASSESSMENT
73M w/ PMHx hypertension, ESRD on HD via R radiocephalic fistula (MWF), DM, HTN, p/w hiccups and peaked T waves, admitted to MICU for c/f baclofen toxicity. Patient received 1x HD on admission. R femoral shiley removed 5/2, had 2 RRT for AMS and failed HD via AVF 5/3. S/p emergent R IJ shiley placement 5/3, started CRRT which is now transitioned back to iHD. Vascular planning RUE fistulogram when medically optimized. Extubated to HFNC then reintubated 5/12 for c/f aspiration w/ hypoxic resp failure. S/p trach 5/14. Downgraded from MICU to RCU 5/16.    Recommendations:   - Hep gtt started for nonocclusive R common iliac DVT  - Fistulogram planning w/ Dr. Lockett - was unable to coordinate concurrent procedure w/ B Team Gtube today  - Consent signed by wife, in chart  - Med/cards/ID optimization, risk stratification documented  - Continue HD via replaced R IJ shiley  - Global care per primary     Vascular Surgery  [id number]

## 2024-05-17 NOTE — PROGRESS NOTE ADULT - SUBJECTIVE AND OBJECTIVE BOX
SUBJECTIVE: Patient seen and examined on AM rounds.     Vital Signs Last 24 Hrs  T(C): 37.1 (17 May 2024 04:00), Max: 37.3 (16 May 2024 14:00)  T(F): 98.8 (17 May 2024 04:00), Max: 99.1 (16 May 2024 14:00)  HR: 66 (17 May 2024 07:48) (59 - 75)  BP: 159/48 (17 May 2024 04:00) (122/57 - 159/48)  BP(mean): 81 (16 May 2024 10:00) (81 - 81)  RR: 13 (17 May 2024 04:00) (12 - 14)  SpO2: 98% (17 May 2024 07:48) (94% - 100%)    Parameters below as of 17 May 2024 07:48  Patient On (Oxygen Delivery Method): ventilator        I&O's Detail    16 May 2024 07:01  -  17 May 2024 07:00  --------------------------------------------------------  IN:    Heparin Infusion: 18 mL    Nepro: 160 mL  Total IN: 178 mL    OUT:  Total OUT: 0 mL    Total NET: 178 mL          Physical Exam:  General: NAD, resting in bed comfortably  Neuro: Awake and alert, following commands  Cardio: Regular rate  Resp: Good effort, no signs of respiratory distress, s/p trach, on vent  GI/Abd: Soft, nontender, nondistended, KO tube in place  Vascular: All 4 extremities warm, R AVF, R IJ shiley    LABS:                        10.3   10.59 )-----------( 315      ( 17 May 2024 06:00 )             29.9     05-17    136  |  91<L>  |  70<H>  ----------------------------<  127<H>  4.5   |  23  |  6.17<H>    Ca    8.4      17 May 2024 06:00  Phos  6.5     05-17  Mg     2.80     05-17    TPro  6.7  /  Alb  2.8<L>  /  TBili  0.2  /  DBili  <0.2  /  AST  19  /  ALT  10  /  AlkPhos  125<H>  05-17    PT/INR - ( 16 May 2024 01:00 )   PT: 13.5 sec;   INR: 1.21 ratio         PTT - ( 16 May 2024 16:55 )  PTT:31.3 sec  Urinalysis Basic - ( 17 May 2024 06:00 )    Color: x / Appearance: x / SG: x / pH: x  Gluc: 127 mg/dL / Ketone: x  / Bili: x / Urobili: x   Blood: x / Protein: x / Nitrite: x   Leuk Esterase: x / RBC: x / WBC x   Sq Epi: x / Non Sq Epi: x / Bacteria: x        RADIOLOGY & ADDITIONAL STUDIES:

## 2024-05-17 NOTE — CHART NOTE - NSCHARTNOTEFT_GEN_A_CORE
Patient seen and examined. Vent settings adjusted. Patient resting comfortably. PEG site c/d/i.     A/P:    73M presented w/ baclofen toxicity presented to MICU and suffered stroke requiring tracheostomy now s/p EGD and PEG.    -Feeds to start 4 hours post op  -Meds can be given immediately  -Local wound care and care not to over tighten bumper  -Trickle feeds, advance as tolerated  -Restart hep gtt tonight  -Rest of care per primary    B Team

## 2024-05-17 NOTE — PROGRESS NOTE ADULT - ASSESSMENT
74 YO M with PMHx of ESRD on HD MWF, HTN, and 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 Baclofen started and course complicated by AMS second to baclofen toxicity requiring intubation and MICU transfer. Course further complicated by LEFT talya and cerebellum stroke, R AVF stenosis, aspiration and B Cereus bacteremia and RLE DVT. s/p trach and transferred to RCU 5/16    NEUROLOGY  # AMS   - MRI HEAD (5/6) with punctate foci in the left talya and left cerebellum with concern for acute infarct.   - MRA HEAD and NECK (5/6) with no large vessel occlusion or stenosis.  - Continue on aspirin and hold DAPT for now pending procedures   - Continue on lipitor for medical management   - Supportive care     CARDIOVASCULAR  # CP with NSTEMI vs demand ischemia concern   - Admitted for CP with peaked T WAVES and ECG with ST deviation on admission   - Troponins elevated on admission with negative CKMB   - TTE (4/30) with EF 72, normal LVRVSF, and no regional wall motion abnormalities   - Case discussed with cardiology and no acute need for cath. DAPT loaded on 5/4 and plan for medical management with ASA, lipitor and heparin GTT.     # Septic vs vasoplegic shock  # Hx of HTN   - Home BP medications held and pressors weaned off   - Continue on coreg 12.5 and hydral 100   - Monitor BP with goal 150/90s    RESPIRATORY  # Respiratory failure   - AMS noted with baclofen toxicity requiring intubation   - Attempted extubation in MICU however course complicated by aspiration and prolonged course and now s/p tracheostomy with IP on 5/14  - Continue on vent 12/500/8/30 and wean as able   - Continue on nebs and HTS Q12H for now     GI  # Dysphagia   - Continue on NGT   - Plan for PEG TODAY with sx     RENAL  # ESRD on HD MWF with R BCF  # Fistula stenosis ?  - VA AVF (5/2) with Right radiocephalic arteriovenous fistula has no hemodynamically significant stenosis present at the anastomotic site ( cm/sec, EDV 49 cm/sec). The usable segment is partially thrombosed with minimal patency.  - Required R FEMORAL line on medicine, then removed on 5/2, and replaced with R IJ SHILEY on 5/3 for CRRT then converted back to iHD MWF   - R SHILEY removed on 5/10 second to bacteremia and replaced on 5/13   - Continue on HD MWF per HD   - Continue on renvela 800 and increase as needed   - Continue on HCO3 650 tabs   - Plan for fistulogram TODAY with vascular during PEG     INFECTIOUS DISEASE  # Aspiration   # B Cereus Bacteremia   - Course complicated by fevers and aspiration event   - MRSA (5/1) negative   - BCx (5/2) negative   - SCx (5/4) and BAL (5/12) negative   - BCx (5/10) with bacillus cereus and cleared on (5/12).   - Case discussed with ID and plans to continue on vanco through 5/21 x 10 day course.     HEME  # AOCD with ESRD   - Anemia panel with AOCD and low # sat   - Transfused on 5/16   - EPO 10K continued on TIW   - Ferrous supplement added   - Monitor HH     VASCULAR   # RLE DVT   - CT AP (5/12) with nonocclusive RIGHT common iliac vein deep venous thrombosis  - Continue on heparin GTT   - RLE precautions     ENDOCRINE  # IDDM2 A1C 6.9  - Continue on NPH 4U and ISS   - Monitor and adjust insulin based on FS     SKIN  - R FEMORAL (5/1-5/2)   - R IJ SHILEY (5/3-5/10)   - R IJ SHILEY (5/13 - )     ETHICS/ GOC    - FULL CODE     DISPO - TBD   72 YO M with PMHx of ESRD on HD MWF, HTN, and 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 Baclofen started and course complicated by AMS second to baclofen toxicity requiring intubation and MICU transfer. Course further complicated by LEFT talya and cerebellum stroke, R AVF stenosis, aspiration and B Cereus bacteremia and RLE DVT. s/p trach and transferred to RCU 5/16    NEUROLOGY  # AMS   - MRI HEAD (5/6) with punctate foci in the left talya and left cerebellum with concern for acute infarct.   - MRA HEAD and NECK (5/6) with no large vessel occlusion or stenosis.  - Continue on aspirin and hold DAPT for now pending procedures   - Continue on lipitor for medical management   - Supportive care     CARDIOVASCULAR  # CP with NSTEMI vs demand ischemia with HTN   - Admitted for CP and HTN with peaked T WAVES and ECG with ST deviation on admission   - Troponins elevated on admission with negative CKMB   - TTE (4/30) with EF 72, normal LVRVSF, and no regional wall motion abnormalities   - Case discussed with cardiology and no acute need for cath. DAPT loaded on 5/4 and plan for medical management with ASA, lipitor and heparin GTT.     # Septic vs vasoplegic shock  # Hx of HTN   - Home BP medications held and pressors weaned off   - Continue on coreg 12.5 and hydral 100   - Monitor BP with goal 150/90s    RESPIRATORY  # Respiratory failure   - AMS noted with baclofen toxicity requiring intubation   - Attempted extubation in MICU however course complicated by aspiration and prolonged course and now s/p tracheostomy with IP on 5/14  - Continue on vent 12/500/5/30 and attempted SBT however apneic likely second to weakness vs talya stroke  - Continue on nebs and HTS Q12H for now     GI  # Dysphagia   - s/p surgical PEG 5/17   - Continue on tube feeds via PEG     RENAL  # ESRD on HD MWF with R BCF  # Fistula stenosis ?  - VA AVF (5/2) with Right radiocephalic arteriovenous fistula has no hemodynamically significant stenosis present at the anastomotic site ( cm/sec, EDV 49 cm/sec). The usable segment is partially thrombosed with minimal patency.  - Required R FEMORAL line on medicine, then removed on 5/2, and replaced with R IJ SHILEY on 5/3 for CRRT then converted back to iHD MWF   - R SHILEY removed on 5/10 second to bacteremia and replaced on 5/13   - Continue on HD MWF per HD   - Continue on renvela 1600   - Continue on HCO3 650 tabs   - Plan for fistulogram next week with vascular    INFECTIOUS DISEASE  # Aspiration   # B Cereus Bacteremia   - Course complicated by fevers and aspiration event   - MRSA (5/1) negative   - BCx (5/2) negative   - SCx (5/4) and BAL (5/12) negative   - BCx (5/10) with bacillus cereus and cleared on (5/12).   - Case discussed with ID and plans to continue on vanco through 5/21 x 10 day course.     HEME  # AOCD with ESRD   - Anemia panel with AOCD and low # sat   - Transfused on 5/16   - EPO 10K continued on TIW   - Ferrous supplement added   - Monitor HH     VASCULAR   # RLE DVT   - CT AP (5/12) with nonocclusive RIGHT common iliac vein deep venous thrombosis  - Continue on heparin GTT   - RLE precautions     ENDOCRINE  # IDDM2 A1C 6.9  - Continue on NPH 4U and ISS   - Monitor and adjust insulin based on FS     SKIN  - R FEMORAL (5/1-5/2)   - R IJ SHILEY (5/3-5/10)   - R IJ SHILEY (5/13 - )     ETHICS/ GOC    - FULL CODE     DISPO - Vent facility

## 2024-05-18 LAB
ADD ON TEST-SPECIMEN IN LAB: SIGNIFICANT CHANGE UP
ANION GAP SERPL CALC-SCNC: 15 MMOL/L — HIGH (ref 7–14)
APTT BLD: 58.6 SEC — HIGH (ref 24.5–35.6)
APTT BLD: 63.6 SEC — HIGH (ref 24.5–35.6)
APTT BLD: 64.7 SEC — HIGH (ref 24.5–35.6)
BASOPHILS # BLD AUTO: 0.02 K/UL — SIGNIFICANT CHANGE UP (ref 0–0.2)
BASOPHILS NFR BLD AUTO: 0.3 % — SIGNIFICANT CHANGE UP (ref 0–2)
BUN SERPL-MCNC: 32 MG/DL — HIGH (ref 7–23)
CALCIUM SERPL-MCNC: 8.3 MG/DL — LOW (ref 8.4–10.5)
CHLORIDE SERPL-SCNC: 96 MMOL/L — LOW (ref 98–107)
CO2 SERPL-SCNC: 26 MMOL/L — SIGNIFICANT CHANGE UP (ref 22–31)
CREAT SERPL-MCNC: 3.64 MG/DL — HIGH (ref 0.5–1.3)
EGFR: 17 ML/MIN/1.73M2 — LOW
EOSINOPHIL # BLD AUTO: 0.22 K/UL — SIGNIFICANT CHANGE UP (ref 0–0.5)
EOSINOPHIL NFR BLD AUTO: 2.9 % — SIGNIFICANT CHANGE UP (ref 0–6)
GLUCOSE BLDC GLUCOMTR-MCNC: 137 MG/DL — HIGH (ref 70–99)
GLUCOSE BLDC GLUCOMTR-MCNC: 151 MG/DL — HIGH (ref 70–99)
GLUCOSE BLDC GLUCOMTR-MCNC: 175 MG/DL — HIGH (ref 70–99)
GLUCOSE BLDC GLUCOMTR-MCNC: 198 MG/DL — HIGH (ref 70–99)
GLUCOSE BLDC GLUCOMTR-MCNC: 242 MG/DL — HIGH (ref 70–99)
GLUCOSE SERPL-MCNC: 143 MG/DL — HIGH (ref 70–99)
GRAM STN FLD: SIGNIFICANT CHANGE UP
HCT VFR BLD CALC: 25.9 % — LOW (ref 39–50)
HGB BLD-MCNC: 8.3 G/DL — LOW (ref 13–17)
IANC: 6.09 K/UL — SIGNIFICANT CHANGE UP (ref 1.8–7.4)
IMM GRANULOCYTES NFR BLD AUTO: 1.1 % — HIGH (ref 0–0.9)
INR BLD: 1.25 RATIO — HIGH (ref 0.85–1.18)
LYMPHOCYTES # BLD AUTO: 0.62 K/UL — LOW (ref 1–3.3)
LYMPHOCYTES # BLD AUTO: 8.3 % — LOW (ref 13–44)
MAGNESIUM SERPL-MCNC: 2.3 MG/DL — SIGNIFICANT CHANGE UP (ref 1.6–2.6)
MCHC RBC-ENTMCNC: 20.3 PG — LOW (ref 27–34)
MCHC RBC-ENTMCNC: 32 GM/DL — SIGNIFICANT CHANGE UP (ref 32–36)
MCV RBC AUTO: 63.5 FL — LOW (ref 80–100)
MONOCYTES # BLD AUTO: 0.44 K/UL — SIGNIFICANT CHANGE UP (ref 0–0.9)
MONOCYTES NFR BLD AUTO: 5.9 % — SIGNIFICANT CHANGE UP (ref 2–14)
NEUTROPHILS # BLD AUTO: 6.09 K/UL — SIGNIFICANT CHANGE UP (ref 1.8–7.4)
NEUTROPHILS NFR BLD AUTO: 81.5 % — HIGH (ref 43–77)
NRBC # BLD: 0 /100 WBCS — SIGNIFICANT CHANGE UP (ref 0–0)
NRBC # FLD: 0.03 K/UL — HIGH (ref 0–0)
PHOSPHATE SERPL-MCNC: 4.4 MG/DL — SIGNIFICANT CHANGE UP (ref 2.5–4.5)
PLATELET # BLD AUTO: 312 K/UL — SIGNIFICANT CHANGE UP (ref 150–400)
POTASSIUM SERPL-MCNC: 4 MMOL/L — SIGNIFICANT CHANGE UP (ref 3.5–5.3)
POTASSIUM SERPL-SCNC: 4 MMOL/L — SIGNIFICANT CHANGE UP (ref 3.5–5.3)
PROTHROM AB SERPL-ACNC: 14 SEC — HIGH (ref 9.5–13)
RBC # BLD: 4.08 M/UL — LOW (ref 4.2–5.8)
RBC # FLD: 19.5 % — HIGH (ref 10.3–14.5)
SODIUM SERPL-SCNC: 137 MMOL/L — SIGNIFICANT CHANGE UP (ref 135–145)
SPECIMEN SOURCE: SIGNIFICANT CHANGE UP
WBC # BLD: 7.35 K/UL — SIGNIFICANT CHANGE UP (ref 3.8–10.5)
WBC # FLD AUTO: 7.35 K/UL — SIGNIFICANT CHANGE UP (ref 3.8–10.5)

## 2024-05-18 PROCEDURE — 99232 SBSQ HOSP IP/OBS MODERATE 35: CPT

## 2024-05-18 PROCEDURE — 71045 X-RAY EXAM CHEST 1 VIEW: CPT | Mod: 26

## 2024-05-18 RX ORDER — PIPERACILLIN AND TAZOBACTAM 4; .5 G/20ML; G/20ML
3.38 INJECTION, POWDER, LYOPHILIZED, FOR SOLUTION INTRAVENOUS ONCE
Refills: 0 | Status: COMPLETED | OUTPATIENT
Start: 2024-05-18 | End: 2024-05-18

## 2024-05-18 RX ORDER — ACETAMINOPHEN 500 MG
1000 TABLET ORAL ONCE
Refills: 0 | Status: COMPLETED | OUTPATIENT
Start: 2024-05-18 | End: 2024-05-18

## 2024-05-18 RX ORDER — PIPERACILLIN AND TAZOBACTAM 4; .5 G/20ML; G/20ML
3.38 INJECTION, POWDER, LYOPHILIZED, FOR SOLUTION INTRAVENOUS EVERY 12 HOURS
Refills: 0 | Status: DISCONTINUED | OUTPATIENT
Start: 2024-05-19 | End: 2024-05-24

## 2024-05-18 RX ADMIN — CHLORHEXIDINE GLUCONATE 15 MILLILITER(S): 213 SOLUTION TOPICAL at 17:17

## 2024-05-18 RX ADMIN — PIPERACILLIN AND TAZOBACTAM 25 GRAM(S): 4; .5 INJECTION, POWDER, LYOPHILIZED, FOR SOLUTION INTRAVENOUS at 17:13

## 2024-05-18 RX ADMIN — Medication 400 MILLIGRAM(S): at 03:50

## 2024-05-18 RX ADMIN — HEPARIN SODIUM 1300 UNIT(S)/HR: 5000 INJECTION INTRAVENOUS; SUBCUTANEOUS at 19:30

## 2024-05-18 RX ADMIN — Medication 1000 MILLIGRAM(S): at 04:20

## 2024-05-18 RX ADMIN — Medication 3 MILLILITER(S): at 10:18

## 2024-05-18 RX ADMIN — Medication 650 MILLIGRAM(S): at 06:25

## 2024-05-18 RX ADMIN — Medication 100 MILLIGRAM(S): at 19:30

## 2024-05-18 RX ADMIN — Medication 2: at 17:17

## 2024-05-18 RX ADMIN — Medication 100 MILLIGRAM(S): at 04:15

## 2024-05-18 RX ADMIN — SODIUM CHLORIDE 4 MILLILITER(S): 9 INJECTION INTRAMUSCULAR; INTRAVENOUS; SUBCUTANEOUS at 10:18

## 2024-05-18 RX ADMIN — Medication 4: at 23:24

## 2024-05-18 RX ADMIN — SEVELAMER CARBONATE 1600 MILLIGRAM(S): 2400 POWDER, FOR SUSPENSION ORAL at 11:28

## 2024-05-18 RX ADMIN — SEVELAMER CARBONATE 1600 MILLIGRAM(S): 2400 POWDER, FOR SUSPENSION ORAL at 06:25

## 2024-05-18 RX ADMIN — SODIUM CHLORIDE 4 MILLILITER(S): 9 INJECTION INTRAMUSCULAR; INTRAVENOUS; SUBCUTANEOUS at 20:35

## 2024-05-18 RX ADMIN — HUMAN INSULIN 4 UNIT(S): 100 INJECTION, SUSPENSION SUBCUTANEOUS at 06:45

## 2024-05-18 RX ADMIN — ATORVASTATIN CALCIUM 20 MILLIGRAM(S): 80 TABLET, FILM COATED ORAL at 21:13

## 2024-05-18 RX ADMIN — Medication 100 MILLIGRAM(S): at 11:27

## 2024-05-18 RX ADMIN — HUMAN INSULIN 4 UNIT(S): 100 INJECTION, SUSPENSION SUBCUTANEOUS at 00:11

## 2024-05-18 RX ADMIN — Medication 81 MILLIGRAM(S): at 11:28

## 2024-05-18 RX ADMIN — HEPARIN SODIUM 1300 UNIT(S)/HR: 5000 INJECTION INTRAVENOUS; SUBCUTANEOUS at 07:21

## 2024-05-18 RX ADMIN — CARVEDILOL PHOSPHATE 12.5 MILLIGRAM(S): 80 CAPSULE, EXTENDED RELEASE ORAL at 19:30

## 2024-05-18 RX ADMIN — Medication 650 MILLIGRAM(S): at 11:28

## 2024-05-18 RX ADMIN — Medication 300 MILLIGRAM(S): at 11:27

## 2024-05-18 RX ADMIN — PIPERACILLIN AND TAZOBACTAM 200 GRAM(S): 4; .5 INJECTION, POWDER, LYOPHILIZED, FOR SOLUTION INTRAVENOUS at 11:24

## 2024-05-18 RX ADMIN — Medication 1 APPLICATION(S): at 06:25

## 2024-05-18 RX ADMIN — Medication 3 MILLILITER(S): at 20:35

## 2024-05-18 RX ADMIN — Medication 2: at 11:38

## 2024-05-18 RX ADMIN — Medication 650 MILLIGRAM(S): at 21:13

## 2024-05-18 RX ADMIN — SEVELAMER CARBONATE 1600 MILLIGRAM(S): 2400 POWDER, FOR SUSPENSION ORAL at 21:12

## 2024-05-18 RX ADMIN — CARVEDILOL PHOSPHATE 12.5 MILLIGRAM(S): 80 CAPSULE, EXTENDED RELEASE ORAL at 10:22

## 2024-05-18 RX ADMIN — HUMAN INSULIN 4 UNIT(S): 100 INJECTION, SUSPENSION SUBCUTANEOUS at 17:18

## 2024-05-18 RX ADMIN — HEPARIN SODIUM 1300 UNIT(S)/HR: 5000 INJECTION INTRAVENOUS; SUBCUTANEOUS at 16:15

## 2024-05-18 RX ADMIN — CHLORHEXIDINE GLUCONATE 15 MILLILITER(S): 213 SOLUTION TOPICAL at 06:24

## 2024-05-18 RX ADMIN — HEPARIN SODIUM 1300 UNIT(S)/HR: 5000 INJECTION INTRAVENOUS; SUBCUTANEOUS at 09:43

## 2024-05-18 RX ADMIN — HUMAN INSULIN 4 UNIT(S): 100 INJECTION, SUSPENSION SUBCUTANEOUS at 11:38

## 2024-05-18 RX ADMIN — HEPARIN SODIUM 1300 UNIT(S)/HR: 5000 INJECTION INTRAVENOUS; SUBCUTANEOUS at 00:34

## 2024-05-18 RX ADMIN — HUMAN INSULIN 4 UNIT(S): 100 INJECTION, SUSPENSION SUBCUTANEOUS at 23:25

## 2024-05-18 RX ADMIN — CHLORHEXIDINE GLUCONATE 1 APPLICATION(S): 213 SOLUTION TOPICAL at 06:24

## 2024-05-18 NOTE — PROGRESS NOTE ADULT - SUBJECTIVE AND OBJECTIVE BOX
Surgery Progress Note    Subjective:     Patient seen and examined at bedside. POD 1. On TF at goal. Hep gtt restarted. PEG site clean, no evidence of reflux.     OBJECTIVE:     T(C): 38.3 (05-18-24 @ 04:00), Max: 38.3 (05-18-24 @ 04:00)  HR: 67 (05-18-24 @ 07:47) (59 - 77)  BP: 139/54 (05-18-24 @ 04:00) (118/49 - 174/54)  RR: 13 (05-18-24 @ 04:00) (12 - 18)  SpO2: 100% (05-18-24 @ 04:00) (97% - 100%)  Wt(kg): --    I&O's Detail    17 May 2024 07:01  -  18 May 2024 07:00  --------------------------------------------------------  IN:    Other (mL): 400 mL  Total IN: 400 mL    OUT:    Oral Fluid: 0 mL    Other (mL): 1900 mL  Total OUT: 1900 mL    Total NET: -1500 mL          PHYSICAL EXAM:    General: NAD, resting in bed comfortably  Neuro: Awake and alert, following commands  Cardio: Regular rate  Resp: Good effort, no signs of respiratory distress, s/p trach, on vent  GI/Abd: Soft, nontender, nondistended, PEG at 2cm bumper, c/d/i w/ Mepilex  Vascular: All 4 extremities warm, R AVF, R IJ shiley     MEDICATIONS  (STANDING):  albuterol/ipratropium for Nebulization 3 milliLiter(s) Nebulizer every 12 hours  aspirin  chewable 81 milliGRAM(s) Oral daily  atorvastatin 20 milliGRAM(s) Oral at bedtime  carvedilol 12.5 milliGRAM(s) Oral every 12 hours  chlorhexidine 0.12% Liquid 15 milliLiter(s) Oral Mucosa every 12 hours  chlorhexidine 2% Cloths 1 Application(s) Topical <User Schedule>  dextrose 10% Bolus 125 milliLiter(s) IV Bolus once  dextrose 5%. 1000 milliLiter(s) (50 mL/Hr) IV Continuous <Continuous>  dextrose 5%. 1000 milliLiter(s) (100 mL/Hr) IV Continuous <Continuous>  dextrose 50% Injectable 12.5 Gram(s) IV Push once  dextrose 50% Injectable 25 Gram(s) IV Push once  epoetin reilly (EPOGEN) Injectable 56030 Unit(s) IV Push <User Schedule>  ferrous    sulfate Liquid 300 milliGRAM(s) Enteral Tube daily  glucagon  Injectable 1 milliGRAM(s) IntraMuscular once  heparin  Infusion. 1100 Unit(s)/Hr (11 mL/Hr) IV Continuous <Continuous>  hydrALAZINE 100 milliGRAM(s) Oral three times a day  insulin lispro (ADMELOG) corrective regimen sliding scale   SubCutaneous every 6 hours  insulin NPH human recombinant 4 Unit(s) SubCutaneous every 6 hours  sevelamer carbonate Powder 1600 milliGRAM(s) Oral every 8 hours  sodium bicarbonate 650 milliGRAM(s) Oral every 8 hours  sodium chloride 3%  Inhalation 4 milliLiter(s) Inhalation every 12 hours    MEDICATIONS  (PRN):  acetaminophen     Tablet .. 650 milliGRAM(s) Oral every 6 hours PRN Temp greater or equal to 38C (100.4F), Mild Pain (1 - 3)  dextrose Oral Gel 15 Gram(s) Oral once PRN Blood Glucose LESS THAN 70 milliGRAM(s)/deciliter  melatonin 3 milliGRAM(s) Oral at bedtime PRN Insomnia  sodium chloride 0.9% lock flush 10 milliLiter(s) IV Push every 1 hour PRN Pre/post blood products, medications, blood draw, and to maintain line patency      LABS:                          8.3    7.35  )-----------( 312      ( 18 May 2024 03:45 )             25.9     05-18    137  |  96<L>  |  32<H>  ----------------------------<  143<H>  4.0   |  26  |  3.64<H>    Ca    8.3<L>      18 May 2024 03:45  Phos  4.4     05-18  Mg     2.30     05-18    TPro  6.7  /  Alb  2.8<L>  /  TBili  0.2  /  DBili  <0.2  /  AST  19  /  ALT  10  /  AlkPhos  125<H>  05-17    PT/INR - ( 18 May 2024 03:45 )   PT: 14.0 sec;   INR: 1.25 ratio         PTT - ( 18 May 2024 03:45 )  PTT:58.6 sec

## 2024-05-18 NOTE — PROGRESS NOTE ADULT - SUBJECTIVE AND OBJECTIVE BOX
Cardiovascular Disease Progress Note  DATE OF SERVICE: 24 @ 09:12    Overnight events: No acute events overnight.    The patient is in no distress on mechanical ventilation via trach.   He denies pain.     Objective Findings:  T(C): 38.3 (24 @ 04:00), Max: 38.3 (24 @ 04:00)  HR: 67 (24 @ 07:47) (59 - 77)  BP: 139/54 (24 @ 04:00) (118/49 - 174/54)  RR: 13 (24 @ 04:00) (12 - 18)  SpO2: 100% (24 @ 04:00) (97% - 100%)  Wt(kg): --  Daily     Daily Weight in k.5 (17 May 2024 20:11)      Physical Exam:  Gen: NAD; Patient resting comfortably  HEENT: EOMI, Normocephalic/ atraumatic  CV: RRR, normal S1 + S2, no m/r/g  Lungs:  Normal respiratory effort; clear to auscultation bilaterally  Abd: soft, non-tender; bowel sounds present  Ext: No edema; warm and well perfused    Telemetry: n/a    Laboratory Data:                        8.3    7.35  )-----------( 312      ( 18 May 2024 03:45 )             25.9     -    137  |  96<L>  |  32<H>  ----------------------------<  143<H>  4.0   |  26  |  3.64<H>    Ca    8.3<L>      18 May 2024 03:45  Phos  4.4       Mg     2.30         TPro  6.7  /  Alb  2.8<L>  /  TBili  0.2  /  DBili  <0.2  /  AST  19  /  ALT  10  /  AlkPhos  125<H>      PT/INR - ( 18 May 2024 03:45 )   PT: 14.0 sec;   INR: 1.25 ratio         PTT - ( 18 May 2024 03:45 )  PTT:58.6 sec          Inpatient Medications:  MEDICATIONS  (STANDING):  albuterol/ipratropium for Nebulization 3 milliLiter(s) Nebulizer every 12 hours  aspirin  chewable 81 milliGRAM(s) Oral daily  atorvastatin 20 milliGRAM(s) Oral at bedtime  carvedilol 12.5 milliGRAM(s) Oral every 12 hours  chlorhexidine 0.12% Liquid 15 milliLiter(s) Oral Mucosa every 12 hours  chlorhexidine 2% Cloths 1 Application(s) Topical <User Schedule>  dextrose 10% Bolus 125 milliLiter(s) IV Bolus once  dextrose 5%. 1000 milliLiter(s) (50 mL/Hr) IV Continuous <Continuous>  dextrose 5%. 1000 milliLiter(s) (100 mL/Hr) IV Continuous <Continuous>  dextrose 50% Injectable 12.5 Gram(s) IV Push once  dextrose 50% Injectable 25 Gram(s) IV Push once  epoetin reilly (EPOGEN) Injectable 32946 Unit(s) IV Push <User Schedule>  ferrous    sulfate Liquid 300 milliGRAM(s) Enteral Tube daily  glucagon  Injectable 1 milliGRAM(s) IntraMuscular once  heparin  Infusion. 1100 Unit(s)/Hr (11 mL/Hr) IV Continuous <Continuous>  hydrALAZINE 100 milliGRAM(s) Oral three times a day  insulin lispro (ADMELOG) corrective regimen sliding scale   SubCutaneous every 6 hours  insulin NPH human recombinant 4 Unit(s) SubCutaneous every 6 hours  sevelamer carbonate Powder 1600 milliGRAM(s) Oral every 8 hours  sodium bicarbonate 650 milliGRAM(s) Oral every 8 hours  sodium chloride 3%  Inhalation 4 milliLiter(s) Inhalation every 12 hours      Assessment: 73 year old man with HTN, HLD, T2DM on insulin, and ESRD on HD presents with supply demand ischemia and angina.    Plan of Care:    #Type II myocardial infarction-  Secondary to distributive shock earlier on admission.   The repeat echo shows no new wall motion abnormality.   I would not pursue cath at this time given recurrent aspiration and chronic respiratory failure s/p trach .   The patient is optimized from a cardiac standpoint to proceed with fistulogram.        #Sinus bradycardia-  No evidence of AV block on admission EKG or telemetry.  No indication for pacing at this time.     #HTN-  BP labile.  Monitor trends.     #ESRD-  Defer to renal     #DVT   - R common Iliac DVT  - Hep GTT  - AC management as per vascular team.       #ACP (advance care planning)-  Advanced care planning was discussed with the patient.  Advance care planning forms were discussed.   Cardiac findings were discussed in detail and all questions were answered.   30 additional minutes spent addressing advance care plans.      High and complex medical decision making in this patient with active comorbidities.     Over 55 minutes spent on total encounter; more than 50% of the visit was spent counseling and/or coordinating care by the attending physician.      Geovani Bravo MD [facility]  Cardiovascular Disease  [phone]

## 2024-05-18 NOTE — PROGRESS NOTE ADULT - SUBJECTIVE AND OBJECTIVE BOX
Patient is a 73y Male     Patient is a 73y old  Male who presents with a chief complaint of Unstable angina, abn EKG (18 May 2024 09:12)      HPI:  73-year-old male past medical history hypertension, ESRD on dialysis Monday Wednesday Friday, diabetes insulin-dependent presents with hiccups patient endorses persistent hiccups for the last 2 days. found to have peaked T waves, a new finding. denies dizziness, N/V, denies CP, palps, abd pain (29 Apr 2024 21:15)      PAST MEDICAL & SURGICAL HISTORY:  Diabetes      Benign essential HTN      HLD (hyperlipidemia)      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      ESRD on hemodialysis      Arteriovenous fistula          MEDICATIONS  (STANDING):  albuterol/ipratropium for Nebulization 3 milliLiter(s) Nebulizer every 12 hours  aspirin  chewable 81 milliGRAM(s) Oral daily  atorvastatin 20 milliGRAM(s) Oral at bedtime  carvedilol 12.5 milliGRAM(s) Oral every 12 hours  chlorhexidine 0.12% Liquid 15 milliLiter(s) Oral Mucosa every 12 hours  chlorhexidine 2% Cloths 1 Application(s) Topical <User Schedule>  dextrose 10% Bolus 125 milliLiter(s) IV Bolus once  dextrose 5%. 1000 milliLiter(s) (50 mL/Hr) IV Continuous <Continuous>  dextrose 5%. 1000 milliLiter(s) (100 mL/Hr) IV Continuous <Continuous>  dextrose 50% Injectable 12.5 Gram(s) IV Push once  dextrose 50% Injectable 25 Gram(s) IV Push once  epoetin reilly (EPOGEN) Injectable 10830 Unit(s) IV Push <User Schedule>  ferrous    sulfate Liquid 300 milliGRAM(s) Enteral Tube daily  glucagon  Injectable 1 milliGRAM(s) IntraMuscular once  heparin  Infusion. 1100 Unit(s)/Hr (11 mL/Hr) IV Continuous <Continuous>  hydrALAZINE 100 milliGRAM(s) Oral three times a day  insulin lispro (ADMELOG) corrective regimen sliding scale   SubCutaneous every 6 hours  insulin NPH human recombinant 4 Unit(s) SubCutaneous every 6 hours  sevelamer carbonate Powder 1600 milliGRAM(s) Oral every 8 hours  sodium bicarbonate 650 milliGRAM(s) Oral every 8 hours  sodium chloride 3%  Inhalation 4 milliLiter(s) Inhalation every 12 hours      Allergies    No Known Allergies    Intolerances        SOCIAL HISTORY:  Denies ETOh,Smoking,     FAMILY HISTORY:  FHx: diabetes mellitus (Father, Aunt)        REVIEW OF SYSTEMS:    CONSTITUTIONAL: No weakness, fevers or chills  EYES/ENT: No visual changes;  No vertigo or throat pain   NECK: No pain or stiffness  RESPIRATORY: No cough, wheezing, hemoptysis; No shortness of breath  CARDIOVASCULAR: No chest pain or palpitations  GASTROINTESTINAL: No abdominal or epigastric pain. No nausea, vomiting, or hematemesis; No diarrhea or constipation. No melena or hematochezia.  GENITOURINARY: No dysuria, frequency or hematuria  NEUROLOGICAL: No numbness or weakness  SKIN: No itching, burning, rashes, or lesions   All other review of systems is negative unless indicated above.    VITAL:  T(C): , Max: 38.3 (05-18-24 @ 04:00)  T(F): , Max: 101 (05-18-24 @ 04:00)  HR: 106 (05-18-24 @ 08:00)  BP: 118/75 (05-18-24 @ 08:00)  BP(mean): 79 (05-17-24 @ 14:00)  RR: 13 (05-18-24 @ 08:00)  SpO2: 94% (05-18-24 @ 08:00)  Wt(kg): --    I and O's:    05-16 @ 07:01  -  05-17 @ 07:00  --------------------------------------------------------  IN: 178 mL / OUT: 0 mL / NET: 178 mL    05-17 @ 07:01  -  05-18 @ 07:00  --------------------------------------------------------  IN: 400 mL / OUT: 1900 mL / NET: -1500 mL          PHYSICAL EXAM:    Constitutional: NAD  HEENT: PERRLA,   Neck: No JVD  Respiratory: CTA B/L  Cardiovascular: S1 and S2  Gastrointestinal: BS+, soft, NT/ND  Extremities: No peripheral edema  Neurological: A/O x 3, no focal deficits  Psychiatric: Normal mood, normal affect  : No Abbasi  Skin: No rashes  Access: Not applicable  Back: No CVA tenderness    LABS:                        8.3    7.35  )-----------( 312      ( 18 May 2024 03:45 )             25.9     05-18    137  |  96<L>  |  32<H>  ----------------------------<  143<H>  4.0   |  26  |  3.64<H>    Ca    8.3<L>      18 May 2024 03:45  Phos  4.4     05-18  Mg     2.30     05-18    TPro  6.7  /  Alb  2.8<L>  /  TBili  0.2  /  DBili  <0.2  /  AST  19  /  ALT  10  /  AlkPhos  125<H>  05-17          RADIOLOGY & ADDITIONAL STUDIES:

## 2024-05-18 NOTE — PROGRESS NOTE ADULT - SUBJECTIVE AND OBJECTIVE BOX
CHIEF COMPLAINT: Patient is a 73y old  Male who presents with a chief complaint of Unstable angina, abn EKG (17 May 2024 22:09)      INTERVAL EVENTS:  - Fever spike overnight     REVIEW OF SYSTEMS: Seen by bedside during AM rounds and     Mode: AC/ CMV (Assist Control/ Continuous Mandatory Ventilation), RR (machine): 12, TV (machine): 500, FiO2: 30, PEEP: 5, ITime: 0.8, MAP: 9, PIP: 22      OBJECTIVE:  ICU Vital Signs Last 24 Hrs  T(C): 38.3 (18 May 2024 04:00), Max: 38.3 (18 May 2024 04:00)  T(F): 101 (18 May 2024 04:00), Max: 101 (18 May 2024 04:00)  HR: 67 (18 May 2024 07:47) (59 - 77)  BP: 139/54 (18 May 2024 04:00) (118/49 - 174/54)  BP(mean): 79 (17 May 2024 14:00) (79 - 85)  ABP: --  ABP(mean): --  RR: 13 (18 May 2024 04:00) (12 - 18)  SpO2: 100% (18 May 2024 04:00) (97% - 100%)    O2 Parameters below as of 18 May 2024 04:00  Patient On (Oxygen Delivery Method): ventilator          Mode: AC/ CMV (Assist Control/ Continuous Mandatory Ventilation), RR (machine): 12, TV (machine): 500, FiO2: 30, PEEP: 5, ITime: 0.8, MAP: 9, PIP: 22    05-17 @ 07:01  -  05-18 @ 07:00  --------------------------------------------------------  IN: 400 mL / OUT: 1900 mL / NET: -1500 mL      CAPILLARY BLOOD GLUCOSE  POCT Blood Glucose.: 137 mg/dL (18 May 2024 06:42)      HOSPITAL MEDICATIONS:  MEDICATIONS  (STANDING):  albuterol/ipratropium for Nebulization 3 milliLiter(s) Nebulizer every 12 hours  aspirin  chewable 81 milliGRAM(s) Oral daily  atorvastatin 20 milliGRAM(s) Oral at bedtime  carvedilol 12.5 milliGRAM(s) Oral every 12 hours  chlorhexidine 0.12% Liquid 15 milliLiter(s) Oral Mucosa every 12 hours  chlorhexidine 2% Cloths 1 Application(s) Topical <User Schedule>  dextrose 10% Bolus 125 milliLiter(s) IV Bolus once  dextrose 5%. 1000 milliLiter(s) (100 mL/Hr) IV Continuous <Continuous>  dextrose 5%. 1000 milliLiter(s) (50 mL/Hr) IV Continuous <Continuous>  dextrose 50% Injectable 25 Gram(s) IV Push once  dextrose 50% Injectable 12.5 Gram(s) IV Push once  epoetin reilly (EPOGEN) Injectable 96095 Unit(s) IV Push <User Schedule>  ferrous    sulfate Liquid 300 milliGRAM(s) Enteral Tube daily  glucagon  Injectable 1 milliGRAM(s) IntraMuscular once  heparin  Infusion. 1100 Unit(s)/Hr (11 mL/Hr) IV Continuous <Continuous>  hydrALAZINE 100 milliGRAM(s) Oral three times a day  insulin lispro (ADMELOG) corrective regimen sliding scale   SubCutaneous every 6 hours  insulin NPH human recombinant 4 Unit(s) SubCutaneous every 6 hours  sevelamer carbonate Powder 1600 milliGRAM(s) Oral every 8 hours  sodium bicarbonate 650 milliGRAM(s) Oral every 8 hours  sodium chloride 3%  Inhalation 4 milliLiter(s) Inhalation every 12 hours    MEDICATIONS  (PRN):  acetaminophen     Tablet .. 650 milliGRAM(s) Oral every 6 hours PRN Temp greater or equal to 38C (100.4F), Mild Pain (1 - 3)  dextrose Oral Gel 15 Gram(s) Oral once PRN Blood Glucose LESS THAN 70 milliGRAM(s)/deciliter  melatonin 3 milliGRAM(s) Oral at bedtime PRN Insomnia  sodium chloride 0.9% lock flush 10 milliLiter(s) IV Push every 1 hour PRN Pre/post blood products, medications, blood draw, and to maintain line patency      PHYSICAL EXAMINATION    LABS:                        8.3    7.35  )-----------( 312      ( 18 May 2024 03:45 )             25.9     05-18    137  |  96<L>  |  32<H>  ----------------------------<  143<H>  4.0   |  26  |  3.64<H>    Ca    8.3<L>      18 May 2024 03:45  Phos  4.4     05-18  Mg     2.30     05-18    TPro  6.7  /  Alb  2.8<L>  /  TBili  0.2  /  DBili  <0.2  /  AST  19  /  ALT  10  /  AlkPhos  125<H>  05-17    PT/INR - ( 18 May 2024 03:45 )   PT: 14.0 sec;   INR: 1.25 ratio       PTT - ( 18 May 2024 03:45 )  PTT:58.6 sec    Urinalysis Basic - ( 18 May 2024 03:45 )  Color: x / Appearance: x / SG: x / pH: x  Gluc: 143 mg/dL / Ketone: x  / Bili: x / Urobili: x   Blood: x / Protein: x / Nitrite: x   Leuk Esterase: x / RBC: x / WBC x   Sq Epi: x / Non Sq Epi: x / Bacteria: x            PAST MEDICAL & SURGICAL HISTORY:  Diabetes      Benign essential HTN      HLD (hyperlipidemia)      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 CHIEF COMPLAINT: Patient is a 73y old  Male who presents with a chief complaint of Unstable angina, abn EKG (17 May 2024 22:09)      INTERVAL EVENTS:  - Fever spike overnight likely second to recurrent RLL PNA and zosyn empirically started   - SBT 10/5 x several hours today     REVIEW OF SYSTEMS: Seen by bedside during AM rounds and denies pain and SOB, but limited second to vented     Mode: AC/ CMV (Assist Control/ Continuous Mandatory Ventilation), RR (machine): 12, TV (machine): 500, FiO2: 30, PEEP: 5, ITime: 0.8, MAP: 9, PIP: 22      OBJECTIVE:  ICU Vital Signs Last 24 Hrs  T(C): 38.3 (18 May 2024 04:00), Max: 38.3 (18 May 2024 04:00)  T(F): 101 (18 May 2024 04:00), Max: 101 (18 May 2024 04:00)  HR: 67 (18 May 2024 07:47) (59 - 77)  BP: 139/54 (18 May 2024 04:00) (118/49 - 174/54)  BP(mean): 79 (17 May 2024 14:00) (79 - 85)  ABP: --  ABP(mean): --  RR: 13 (18 May 2024 04:00) (12 - 18)  SpO2: 100% (18 May 2024 04:00) (97% - 100%)    O2 Parameters below as of 18 May 2024 04:00  Patient On (Oxygen Delivery Method): ventilator          Mode: AC/ CMV (Assist Control/ Continuous Mandatory Ventilation), RR (machine): 12, TV (machine): 500, FiO2: 30, PEEP: 5, ITime: 0.8, MAP: 9, PIP: 22    05-17 @ 07:01  -  05-18 @ 07:00  --------------------------------------------------------  IN: 400 mL / OUT: 1900 mL / NET: -1500 mL      CAPILLARY BLOOD GLUCOSE  POCT Blood Glucose.: 137 mg/dL (18 May 2024 06:42)      HOSPITAL MEDICATIONS:  MEDICATIONS  (STANDING):  albuterol/ipratropium for Nebulization 3 milliLiter(s) Nebulizer every 12 hours  aspirin  chewable 81 milliGRAM(s) Oral daily  atorvastatin 20 milliGRAM(s) Oral at bedtime  carvedilol 12.5 milliGRAM(s) Oral every 12 hours  chlorhexidine 0.12% Liquid 15 milliLiter(s) Oral Mucosa every 12 hours  chlorhexidine 2% Cloths 1 Application(s) Topical <User Schedule>  dextrose 10% Bolus 125 milliLiter(s) IV Bolus once  dextrose 5%. 1000 milliLiter(s) (100 mL/Hr) IV Continuous <Continuous>  dextrose 5%. 1000 milliLiter(s) (50 mL/Hr) IV Continuous <Continuous>  dextrose 50% Injectable 25 Gram(s) IV Push once  dextrose 50% Injectable 12.5 Gram(s) IV Push once  epoetin reilly (EPOGEN) Injectable 63550 Unit(s) IV Push <User Schedule>  ferrous    sulfate Liquid 300 milliGRAM(s) Enteral Tube daily  glucagon  Injectable 1 milliGRAM(s) IntraMuscular once  heparin  Infusion. 1100 Unit(s)/Hr (11 mL/Hr) IV Continuous <Continuous>  hydrALAZINE 100 milliGRAM(s) Oral three times a day  insulin lispro (ADMELOG) corrective regimen sliding scale   SubCutaneous every 6 hours  insulin NPH human recombinant 4 Unit(s) SubCutaneous every 6 hours  sevelamer carbonate Powder 1600 milliGRAM(s) Oral every 8 hours  sodium bicarbonate 650 milliGRAM(s) Oral every 8 hours  sodium chloride 3%  Inhalation 4 milliLiter(s) Inhalation every 12 hours    MEDICATIONS  (PRN):  acetaminophen     Tablet .. 650 milliGRAM(s) Oral every 6 hours PRN Temp greater or equal to 38C (100.4F), Mild Pain (1 - 3)  dextrose Oral Gel 15 Gram(s) Oral once PRN Blood Glucose LESS THAN 70 milliGRAM(s)/deciliter  melatonin 3 milliGRAM(s) Oral at bedtime PRN Insomnia  sodium chloride 0.9% lock flush 10 milliLiter(s) IV Push every 1 hour PRN Pre/post blood products, medications, blood draw, and to maintain line patency      PHYSICAL EXAMINATION  General: NAD   HEENT: Trach present   Cards: S1/S2, no murmurs   Pulm: Course vent sounds bilaterally. No wheezes.   Abdomen: Soft, nondistended and nontender. BS (+) PEG present.   Extremities: No pedal edema. THAI of BL upper and lower extremities, however limited by severe weakness   Neurology: Awake and able to follow commands and mouth simple words with no acute focal neurological deficits     LABS:                        8.3    7.35  )-----------( 312      ( 18 May 2024 03:45 )             25.9     05-18    137  |  96<L>  |  32<H>  ----------------------------<  143<H>  4.0   |  26  |  3.64<H>    Ca    8.3<L>      18 May 2024 03:45  Phos  4.4     05-18  Mg     2.30     05-18    TPro  6.7  /  Alb  2.8<L>  /  TBili  0.2  /  DBili  <0.2  /  AST  19  /  ALT  10  /  AlkPhos  125<H>  05-17    PT/INR - ( 18 May 2024 03:45 )   PT: 14.0 sec;   INR: 1.25 ratio       PTT - ( 18 May 2024 03:45 )  PTT:58.6 sec    Urinalysis Basic - ( 18 May 2024 03:45 )  Color: x / Appearance: x / SG: x / pH: x  Gluc: 143 mg/dL / Ketone: x  / Bili: x / Urobili: x   Blood: x / Protein: x / Nitrite: x   Leuk Esterase: x / RBC: x / WBC x   Sq Epi: x / Non Sq Epi: x / Bacteria: x            PAST MEDICAL & SURGICAL HISTORY:  Diabetes      Benign essential HTN      HLD (hyperlipidemia)      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

## 2024-05-18 NOTE — PROGRESS NOTE ADULT - ASSESSMENT
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 Baclofen started and course complicated by AMS second to baclofen toxicity requiring intubation and MICU transfer. Course further complicated by LEFT talya and cerebellum stroke, R AVF stenosis, aspiration and B Cereus bacteremia and RLE DVT. s/p trach and transferred to RCU 5/16    NEUROLOGY  # AMS   - MRI HEAD (5/6) with punctate foci in the left talya and left cerebellum with concern for acute infarct.   - MRA HEAD and NECK (5/6) with no large vessel occlusion or stenosis.  - Continue on aspirin and hold DAPT for now pending procedures   - Continue on lipitor for medical management   - Supportive care     CARDIOVASCULAR  # CP with NSTEMI vs demand ischemia with HTN   - Admitted for CP and HTN with peaked T WAVES and ECG with ST deviation on admission   - Troponins elevated on admission with negative CKMB   - TTE (4/30) with EF 72, normal LVRVSF, and no regional wall motion abnormalities   - Case discussed with cardiology and no acute need for cath. DAPT loaded on 5/4 and plan for medical management with ASA, lipitor and heparin GTT.     # Septic vs vasoplegic shock  # Hx of HTN   - Home BP medications held and pressors weaned off   - Continue on coreg 12.5 and hydral 100   - Monitor BP with goal 150/90s    RESPIRATORY  # Respiratory failure   - AMS noted with baclofen toxicity requiring intubation   - Attempted extubation in MICU however course complicated by aspiration and prolonged course and now s/p tracheostomy with IP on 5/14  - Continue on vent 12/500/5/30 and attempted SBT however apneic likely second to weakness vs talya stroke  - Continue on nebs and HTS Q12H for now     GI  # Dysphagia   - s/p surgical PEG 5/17   - Continue on tube feeds via PEG     RENAL  # ESRD on HD MWF with R BCF  # Fistula stenosis ?  - VA AVF (5/2) with Right radiocephalic arteriovenous fistula has no hemodynamically significant stenosis present at the anastomotic site ( cm/sec, EDV 49 cm/sec). The usable segment is partially thrombosed with minimal patency.  - Required R FEMORAL line on medicine, then removed on 5/2, and replaced with R IJ SHILEY on 5/3 for CRRT then converted back to iHD MWF   - R SHILEY removed on 5/10 second to bacteremia and replaced on 5/13   - Continue on HD MWF per HD   - Continue on renvela 1600   - Continue on HCO3 650 tabs   - Plan for fistulogram next week with vascular    INFECTIOUS DISEASE  # Aspiration   # B Cereus Bacteremia   - Course complicated by fevers and aspiration event   - MRSA (5/1) negative   - BCx (5/2) negative   - SCx (5/4) and BAL (5/12) negative   - BCx (5/10) with bacillus cereus and cleared on (5/12).   - Case discussed with ID and plans to continue on vanco through 5/21 x 10 day course.     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 Baclofen started and course complicated by AMS second to baclofen toxicity requiring intubation and MICU transfer. Course further complicated by LEFT talya and cerebellum stroke, R AVF stenosis, aspiration and B Cereus bacteremia and RLE DVT. s/p trach and transferred to RCU 5/16    NEUROLOGY  # AMS   - MRI HEAD (5/6) with punctate foci in the left talya and left cerebellum with concern for acute infarct.   - MRA HEAD and NECK (5/6) with no large vessel occlusion or stenosis.  - Continue on aspirin and hold DAPT for now pending procedures   - Continue on lipitor for medical management   - Supportive care     CARDIOVASCULAR  # CP with NSTEMI vs demand ischemia with HTN   - Admitted for CP and HTN with peaked T WAVES and ECG with ST deviation on admission   - Troponins elevated on admission with negative CKMB   - TTE (4/30) with EF 72, normal LVRVSF, and no regional wall motion abnormalities   - Case discussed with cardiology and no acute need for cath. DAPT loaded on 5/4 and plan for medical management with ASA, lipitor and heparin GTT.     # Septic vs vasoplegic shock  # Hx of HTN   - Home BP medications held and pressors weaned off   - Continue on coreg 12.5 and hydral 100   - Monitor BP with goal 150/90s    RESPIRATORY  # Respiratory failure   - AMS noted with baclofen toxicity requiring intubation   - Attempted extubation in MICU however course complicated by aspiration and prolonged course and now s/p tracheostomy with IP on 5/14  - Continue on vent 12/500/5/30 and able to tolerate SBT 10/5 x several hours but limited by weakness   - Continue on nebs and HTS Q12H for now     GI  # Dysphagia   - s/p surgical PEG 5/17   - Continue on tube feeds via PEG     RENAL  # ESRD on HD MWF with R BCF  # Fistula stenosis ?  - VA AVF (5/2) with Right radiocephalic arteriovenous fistula has no hemodynamically significant stenosis present at the anastomotic site ( cm/sec, EDV 49 cm/sec). The usable segment is partially thrombosed with minimal patency.  - Required R FEMORAL line on medicine, then removed on 5/2, and replaced with R IJ SHILEY on 5/3 for CRRT then converted back to iHD MWF   - R SHILEY removed on 5/10 second to bacteremia and replaced on 5/13   - Continue on HD MWF per HD   - Continue on renvela 1600   - Continue on HCO3 650 tabs   - Plan for fistulogram next week with vascular    INFECTIOUS DISEASE  # Aspiration   - Recurrent fever spike 5/17 overnight and CXR with RLL opacity   - BCx and SCx sent and pending.   - Zosyn started empirically (5/18 - )     # B Cereus Bacteremia   - Course complicated by fevers and aspiration event   - MRSA (5/1) negative   - BCx (5/2) negative   - SCx (5/4) and BAL (5/12) negative   - BCx (5/10) with bacillus cereus and cleared on (5/12).   - Case discussed with ID and plans to continue on vanco through 5/21 x 10 day course. Dose per level.     HEME  # AOCD with ESRD   - Anemia panel with AOCD and low # sat   - Transfused on 5/16   - EPO 10K continued on TIW   - Ferrous supplement added   - Monitor HH     VASCULAR   # RLE DVT   - CT AP (5/12) with nonocclusive RIGHT common iliac vein deep venous thrombosis  - Continue on heparin GTT   - RLE precautions     ENDOCRINE  # IDDM2 A1C 6.9  - Continue on NPH 4U and ISS   - Monitor and adjust insulin based on FS     SKIN  - R FEMORAL (5/1-5/2)   - R IJ SHILEY (5/3-5/10)   - R IJ SHILEY (5/13 - )     ETHICS/ GOC    - FULL CODE     DISPO - Vent facility

## 2024-05-18 NOTE — PROGRESS NOTE ADULT - ASSESSMENT
74y/o M w/ PMHx HTN, ESRD on dialysis MWF, DM, presented with hiccups x2days, found to have peaked T waves. S/p RRT for AMS x2 5/1 and was intubated for airway protection in setting of possible baclofen overdose. Hospital course c/b L pontine and cerebellar infarcts (MRI 5/6), concern for ACS s/p DAPT load and heparin gtt x48hrs and acute hypoxic resp failure s/p extubation 5/9 and re-intubation 5/12, s/p trach 5/14. Patient also on heparin gtt for nonocclusive R common iliac vein thrombus. General surgery consulted for G tube placement. Now s/p EGD w/ PEG 5/17, recovering well.    Plan:  - May continue feeds  - Local wound care, mild leakage CAN occur and is typically self resolving  - Surgery is available please do not hesitate to reach out    Discussed on AM rounds    B Team  [id number]

## 2024-05-18 NOTE — PROGRESS NOTE ADULT - ASSESSMENT
73-year-old male past medical history hypertension, ESRD on dialysis Monday Wednesday Friday, diabetes insulin-dependent presents with hiccups patient endorses persistent hiccups for the last 2 days. Nephrology consulted for HD needs.    A/P  ESRD:  Center: [place]  Nephrologist: Dr. Hardwick  Access: R AVF  MWF schedule  Vascular for fistulogram   s/p femoral shiley on 5/1, now removed  s/p placement of IJ shiley 5/3  Stopped CRRT   Restarted IHD  s/p HD 5/7 UF 1778; treatment terminated early due to hypotension   S/P MRA head/neck w/ gadolinium --> Received HD on 5/9 and 5/10.  5/10 Will need to dialyze 3 days consecutively--> plan for HD on 5/11 5/11 shiley removed due to bacteremia; did not receive HD.  Line holiday  5/12 - BUN worsened; K up trending.    5/13 Shiley placed.  5/17: s/p PEG placement. Pllanned for fistulogram but rescheduled for next week.  + swelling of RUE.  HD today w/ UF goal of 1.5L.  Consent obtained and placed in ED chart  Renal diet  Monitor BMP    Hyperkalemia  Improved w/ HD.  C/W HD schedule as above.  Lokelma 10gm PRN for K >5.3 on nonHD days.  Low K diet.  Monitor closely     HTN:  BP fluctuating.  Off pressors.  On carvedilol 12.5mg BID, hydralazine 100mg TID, nifedipine 60mg qd.  UF w/ HD.  Monitor BP.    Anemia:  At goal.  + iron deficiency.  Tsat 13% - on ferrous sulfate 300mg qd via PEG.  Will hold venofer for now in view of up trending leukocytosis.  SILVA w/ HD.  Tranfuse for Hgb <7.  Monitor Hb.    CKD-MBD  Check PTH  PO4 worsened.  Increase Sevelamer 800mg to 1600mg TID.  Low PO4 diet.  Monitor Ca, PO4 daily    Hypocalcemia:  In setting of CKD vs. hypoalbuminemia.  Optimize albumin.  Corrected Ca WNL.  Improving.  Monitor Ca.    D/W family and ACP.

## 2024-05-18 NOTE — PROGRESS NOTE ADULT - SUBJECTIVE AND OBJECTIVE BOX
JIMMY BLAND  73y  Patient is a 73y old  Male who presents with a chief complaint of Unstable angina, abn EKG (18 May 2024 22:53)    HPI:  ESRD on HD.    HEALTH ISSUES - PROBLEM Dx:        MEDICATIONS  (STANDING):  albuterol/ipratropium for Nebulization 3 milliLiter(s) Nebulizer every 12 hours  aspirin  chewable 81 milliGRAM(s) Oral daily  atorvastatin 20 milliGRAM(s) Oral at bedtime  carvedilol 12.5 milliGRAM(s) Oral every 12 hours  chlorhexidine 0.12% Liquid 15 milliLiter(s) Oral Mucosa every 12 hours  chlorhexidine 2% Cloths 1 Application(s) Topical <User Schedule>  dextrose 10% Bolus 125 milliLiter(s) IV Bolus once  dextrose 5%. 1000 milliLiter(s) (50 mL/Hr) IV Continuous <Continuous>  dextrose 5%. 1000 milliLiter(s) (100 mL/Hr) IV Continuous <Continuous>  dextrose 50% Injectable 12.5 Gram(s) IV Push once  dextrose 50% Injectable 25 Gram(s) IV Push once  epoetin reilly (EPOGEN) Injectable 78702 Unit(s) IV Push <User Schedule>  ferrous    sulfate Liquid 300 milliGRAM(s) Enteral Tube daily  glucagon  Injectable 1 milliGRAM(s) IntraMuscular once  heparin  Infusion. 1100 Unit(s)/Hr (11 mL/Hr) IV Continuous <Continuous>  hydrALAZINE 100 milliGRAM(s) Oral three times a day  insulin lispro (ADMELOG) corrective regimen sliding scale   SubCutaneous every 6 hours  insulin NPH human recombinant 4 Unit(s) SubCutaneous every 6 hours  sevelamer carbonate Powder 1600 milliGRAM(s) Oral every 8 hours  sodium bicarbonate 650 milliGRAM(s) Oral every 8 hours  sodium chloride 3%  Inhalation 4 milliLiter(s) Inhalation every 12 hours    MEDICATIONS  (PRN):  acetaminophen     Tablet .. 650 milliGRAM(s) Oral every 6 hours PRN Temp greater or equal to 38C (100.4F), Mild Pain (1 - 3)  dextrose Oral Gel 15 Gram(s) Oral once PRN Blood Glucose LESS THAN 70 milliGRAM(s)/deciliter  melatonin 3 milliGRAM(s) Oral at bedtime PRN Insomnia  sodium chloride 0.9% lock flush 10 milliLiter(s) IV Push every 1 hour PRN Pre/post blood products, medications, blood draw, and to maintain line patency    Vital Signs Last 24 Hrs  T(C): 37.2 (18 May 2024 20:00), Max: 38.3 (18 May 2024 04:00)  T(F): 99 (18 May 2024 20:00), Max: 101 (18 May 2024 04:00)  HR: 67 (18 May 2024 20:35) (67 - 106)  BP: 115/52 (18 May 2024 20:00) (115/52 - 149/58)  BP(mean): 69 (18 May 2024 20:00) (69 - 69)  RR: 12 (18 May 2024 20:00) (12 - 13)  SpO2: 99% (18 May 2024 20:35) (94% - 100%)    Parameters below as of 18 May 2024 20:35  Patient On (Oxygen Delivery Method): ventilator      Daily     Daily     PHYSICAL EXAM:  Constitutional: He  appears comfortable and not distressed. Not diaphoretic.    Neck:  The thyroid is normal. Trachea is midline.     Breasts: Normal examination.    Respiratory: The lungs are clear to auscultation. No dullness and expansion is normal.    Cardiovascular: S1 and S2 are normal. No mummurs, rubs or gallops are present.    Gastrointestinal: The abdomen is soft. No tenderness is present. No masses are present. Bowel sounds are normal.    Genitourinary: The bladder is not distended. No CVA tenderness is present.    Extremities: No edema is noted. No deformities are present.    Neurological: Tone, power and sensation are normal.     Skin: No leasions are seen  or palpated.    Lymph Nodes: No lymphadenopathy is present.                              8.3    7.35  )-----------( 312      ( 18 May 2024 03:45 )             25.9     05-18    137  |  96<L>  |  32<H>  ----------------------------<  143<H>  4.0   |  26  |  3.64<H>    Ca    8.3<L>      18 May 2024 03:45  Phos  4.4     05-18  Mg     2.30     05-18    TPro  6.7  /  Alb  2.8<L>  /  TBili  0.2  /  DBili  <0.2  /  AST  19  /  ALT  10  /  AlkPhos  125<H>  05-17    Urinalysis Basic - ( 18 May 2024 03:45 )    Color: x / Appearance: x / SG: x / pH: x  Gluc: 143 mg/dL / Ketone: x  / Bili: x / Urobili: x   Blood: x / Protein: x / Nitrite: x   Leuk Esterase: x / RBC: x / WBC x   Sq Epi: x / Non Sq Epi: x / Bacteria: x

## 2024-05-18 NOTE — PROGRESS NOTE ADULT - SUBJECTIVE AND OBJECTIVE BOX
Patient is a 73y old  Male who presents with a chief complaint of Unstable angina, abn EKG (18 May 2024 09:48)      SUBJECTIVE / OVERNIGHT EVENTS: Tmax overnight 101, cxr c/w RLL PNA, now on Zosyn, blood Cx s sent. remains on Vanco for B. cereus bacteremia     MEDICATIONS  (STANDING):  albuterol/ipratropium for Nebulization 3 milliLiter(s) Nebulizer every 12 hours  aspirin  chewable 81 milliGRAM(s) Oral daily  atorvastatin 20 milliGRAM(s) Oral at bedtime  carvedilol 12.5 milliGRAM(s) Oral every 12 hours  chlorhexidine 0.12% Liquid 15 milliLiter(s) Oral Mucosa every 12 hours  chlorhexidine 2% Cloths 1 Application(s) Topical <User Schedule>  dextrose 10% Bolus 125 milliLiter(s) IV Bolus once  dextrose 5%. 1000 milliLiter(s) (50 mL/Hr) IV Continuous <Continuous>  dextrose 5%. 1000 milliLiter(s) (100 mL/Hr) IV Continuous <Continuous>  dextrose 50% Injectable 12.5 Gram(s) IV Push once  dextrose 50% Injectable 25 Gram(s) IV Push once  epoetin reilly (EPOGEN) Injectable 59506 Unit(s) IV Push <User Schedule>  ferrous    sulfate Liquid 300 milliGRAM(s) Enteral Tube daily  glucagon  Injectable 1 milliGRAM(s) IntraMuscular once  heparin  Infusion. 1100 Unit(s)/Hr (11 mL/Hr) IV Continuous <Continuous>  hydrALAZINE 100 milliGRAM(s) Oral three times a day  insulin lispro (ADMELOG) corrective regimen sliding scale   SubCutaneous every 6 hours  insulin NPH human recombinant 4 Unit(s) SubCutaneous every 6 hours  sevelamer carbonate Powder 1600 milliGRAM(s) Oral every 8 hours  sodium bicarbonate 650 milliGRAM(s) Oral every 8 hours  sodium chloride 3%  Inhalation 4 milliLiter(s) Inhalation every 12 hours    MEDICATIONS  (PRN):  acetaminophen     Tablet .. 650 milliGRAM(s) Oral every 6 hours PRN Temp greater or equal to 38C (100.4F), Mild Pain (1 - 3)  dextrose Oral Gel 15 Gram(s) Oral once PRN Blood Glucose LESS THAN 70 milliGRAM(s)/deciliter  melatonin 3 milliGRAM(s) Oral at bedtime PRN Insomnia  sodium chloride 0.9% lock flush 10 milliLiter(s) IV Push every 1 hour PRN Pre/post blood products, medications, blood draw, and to maintain line patency      Vital Signs Last 24 Hrs  T(F): 99 (05-18-24 @ 20:00), Max: 101 (05-18-24 @ 04:00)  HR: 67 (05-18-24 @ 20:35) (67 - 106)  BP: 115/52 (05-18-24 @ 20:00) (115/52 - 149/58)  RR: 12 (05-18-24 @ 20:00) (12 - 13)  SpO2: 99% (05-18-24 @ 20:35) (94% - 100%)  Telemetry:   CAPILLARY BLOOD GLUCOSE      POCT Blood Glucose.: 198 mg/dL (18 May 2024 16:49)  POCT Blood Glucose.: 175 mg/dL (18 May 2024 11:15)  POCT Blood Glucose.: 137 mg/dL (18 May 2024 06:42)  POCT Blood Glucose.: 151 mg/dL (18 May 2024 05:07)  POCT Blood Glucose.: 131 mg/dL (17 May 2024 23:20)    I&O's Summary    17 May 2024 07:01  -  18 May 2024 07:00  --------------------------------------------------------  IN: 400 mL / OUT: 1900 mL / NET: -1500 mL    18 May 2024 07:01  -  18 May 2024 22:54  --------------------------------------------------------  IN: 26 mL / OUT: 0 mL / NET: 26 mL        PHYSICAL EXAM:  GENERAL: NAD, well-developed  HEAD:  Atraumatic, Normocephalic  EYES: EOMI, PERRLA, conjunctiva and sclera clear  NECK: Supple, No JVD  CHEST/LUNG: Clear to auscultation bilaterally; No wheeze  HEART: Regular rate and rhythm; No murmurs, rubs, or gallops  ABDOMEN: Soft, Nontender, Nondistended; Bowel sounds present  EXTREMITIES:  2+ Peripheral Pulses, No clubbing, cyanosis, or edema  PSYCH: AAOx3  NEUROLOGY: non-focal  SKIN: No rashes or lesions    LABS:                        8.3    7.35  )-----------( 312      ( 18 May 2024 03:45 )             25.9     05-18    137  |  96<L>  |  32<H>  ----------------------------<  143<H>  4.0   |  26  |  3.64<H>    Ca    8.3<L>      18 May 2024 03:45  Phos  4.4     05-18  Mg     2.30     05-18    TPro  6.7  /  Alb  2.8<L>  /  TBili  0.2  /  DBili  <0.2  /  AST  19  /  ALT  10  /  AlkPhos  125<H>  05-17    PT/INR - ( 18 May 2024 03:45 )   PT: 14.0 sec;   INR: 1.25 ratio         PTT - ( 18 May 2024 14:21 )  PTT:63.6 sec      Urinalysis Basic - ( 18 May 2024 03:45 )    Color: x / Appearance: x / SG: x / pH: x  Gluc: 143 mg/dL / Ketone: x  / Bili: x / Urobili: x   Blood: x / Protein: x / Nitrite: x   Leuk Esterase: x / RBC: x / WBC x   Sq Epi: x / Non Sq Epi: x / Bacteria: x        RADIOLOGY & ADDITIONAL TESTS:    Imaging Personally Reviewed:    Consultant(s) Notes Reviewed:      Care Discussed with Consultants/Other Providers:

## 2024-05-18 NOTE — PROGRESS NOTE ADULT - NS ATTEND AMEND GEN_ALL_CORE FT
73-year-old male with a history of ESRD on dialysis, hypertension and diabetes who was admitted with demand ischemia versus NSTEMI.  Course was complicated secondary to baclofen toxicity requiring intubation and MICU transfer.  Course complicated by left talya and cerebellum stroke, aspiration and be serious bacteremia and right lower extremity DVT.    agree with plan above

## 2024-05-18 NOTE — PROGRESS NOTE ADULT - ASSESSMENT
73-year-old male past medical history hypertension, ESRD on dialysis Monday Wednesday Friday, diabetes insulin-dependent presents with hiccups patient endorses persistent hiccups for the last 2 days.   found to have peaked T waves, a new finding. denied dizziness, N/V, denies CP, palps, abd pain  Upon admission seen by CArd, renal and GI  found to have a distended GB prob 2/2 gastroparesis, was started on Reglan  has received 4 doses of baclofen since 4/30 2/2 hiccups, last dose on 5/1 at 5 am  had received Haldol for agitation at 11 pm on 4/30, AMS observed in pm of 5/1  AMS ongoing, RRT x 2 called  now transferred to MICU for encephalopathy requiring  airway protection on 5/2,  intubated for airway protection, was on  propofol and pressors. now off  toxicology consulted upon dx of encephalopathy, not impressed AMS 2/2 Haldol nor baclofen . AMS was 2/2 acute CVA   HD was not done timely until femoral shiley was placed 2/2 clotted RUE AVF. now removed and RIJ shiley placed on 5/3  has been nonverbal since pm 5/1.     MR brain 5/6 c/w L pontine and L cerebellar acute infarcts, no hemorrhage . as per neuro: embolic in nature.   encephalopathy probably 2/2 acute CVA, now follows commands appropriately off sedation.   no SZ focus on EEG,   off CRRT,  HD resumed as per renal.   remains intubated, now trached  off keppra  AC resumed, on Heparin drip for R common iliac DVT  completed 5 days of empiric ZOSYN on 5/7, shortly after became septic again after an extubation trial. bacteremia w B. cereus, on Vanc through 5/20 as per ID    s/p trache placement by pulm,   IR unable to find a window for G-J tube. PEG placed by surgery 5/17, resume feeds when cleared by surgery  HD via R IJ.  Tmax overnight( 5/18)  101, cxr c/w RLL PNA, now on Zosyn, blood Cx s sent. remains on Vanco for B. cereus bacteremia   leukocytosis resolved  EKG changes on 5/3 c/w NSTEMI loaded w DAPT , was  on Heparin drip x 48 hrs. rpt TTE is unchanged  ID, Neuro , renal, cardiology following.  clotted RUE AVF. fistulogram when medically stable. vascular following   HD via RIght IJ Shiley.   NGT in place in stomach.   LP done, no sign of meningitis.   NEUROLOGY     - CTH without acute findings   - LP done, no acute findings  - MR brain w acute infarcts: L talya and L cerebellum, nonhemorrhagic  - no Sz focus on EEG    CARDIOVASCULAR   - ptn never had CP. just peaked T waves. was awaiting ischemic study w nucl stress test  - TTE showing EF 72%, rpt unchanged  - EKG changes on 5/3, loaded w DAPT now on Heparin drip 2/2 DVT    GI  - PEG placed, npo w tube feeds  - Gastroparesis       RENAL   -  HD as per renal  - via R IJ shiley  -  fistula study of RUE  as per VAscular      INFECTIOUS DISEASE     completed a course of Zosyn, then became septic, bacteremic a B. cereus, completed 3 days of Meropenem, now on vanc through 5/20  on Zostn since 5/18 for RLL PNA    ENDOCRINE   - DM  - cw ISS    GOC:  Full code

## 2024-05-19 LAB
ANION GAP SERPL CALC-SCNC: 16 MMOL/L — HIGH (ref 7–14)
APTT BLD: 117.2 SEC — SIGNIFICANT CHANGE UP (ref 24.5–35.6)
APTT BLD: 48.4 SEC — HIGH (ref 24.5–35.6)
APTT BLD: 82.4 SEC — HIGH (ref 24.5–35.6)
BASOPHILS # BLD AUTO: 0.05 K/UL — SIGNIFICANT CHANGE UP (ref 0–0.2)
BASOPHILS NFR BLD AUTO: 0.5 % — SIGNIFICANT CHANGE UP (ref 0–2)
BUN SERPL-MCNC: 51 MG/DL — HIGH (ref 7–23)
CALCIUM SERPL-MCNC: 7.9 MG/DL — LOW (ref 8.4–10.5)
CHLORIDE SERPL-SCNC: 92 MMOL/L — LOW (ref 98–107)
CO2 SERPL-SCNC: 25 MMOL/L — SIGNIFICANT CHANGE UP (ref 22–31)
CREAT SERPL-MCNC: 5.08 MG/DL — HIGH (ref 0.5–1.3)
EGFR: 11 ML/MIN/1.73M2 — LOW
EOSINOPHIL # BLD AUTO: 0.38 K/UL — SIGNIFICANT CHANGE UP (ref 0–0.5)
EOSINOPHIL NFR BLD AUTO: 3.8 % — SIGNIFICANT CHANGE UP (ref 0–6)
GLUCOSE BLDC GLUCOMTR-MCNC: 140 MG/DL — HIGH (ref 70–99)
GLUCOSE BLDC GLUCOMTR-MCNC: 233 MG/DL — HIGH (ref 70–99)
GLUCOSE BLDC GLUCOMTR-MCNC: 251 MG/DL — HIGH (ref 70–99)
GLUCOSE SERPL-MCNC: 193 MG/DL — HIGH (ref 70–99)
HCT VFR BLD CALC: 23.9 % — LOW (ref 39–50)
HGB BLD-MCNC: 8.1 G/DL — LOW (ref 13–17)
IANC: 8.15 K/UL — HIGH (ref 1.8–7.4)
IMM GRANULOCYTES NFR BLD AUTO: 1.2 % — HIGH (ref 0–0.9)
LYMPHOCYTES # BLD AUTO: 0.88 K/UL — LOW (ref 1–3.3)
LYMPHOCYTES # BLD AUTO: 8.7 % — LOW (ref 13–44)
MAGNESIUM SERPL-MCNC: 2.5 MG/DL — SIGNIFICANT CHANGE UP (ref 1.6–2.6)
MCHC RBC-ENTMCNC: 21 PG — LOW (ref 27–34)
MCHC RBC-ENTMCNC: 33.9 GM/DL — SIGNIFICANT CHANGE UP (ref 32–36)
MCV RBC AUTO: 61.9 FL — LOW (ref 80–100)
MONOCYTES # BLD AUTO: 0.5 K/UL — SIGNIFICANT CHANGE UP (ref 0–0.9)
MONOCYTES NFR BLD AUTO: 5 % — SIGNIFICANT CHANGE UP (ref 2–14)
NEUTROPHILS # BLD AUTO: 8.15 K/UL — HIGH (ref 1.8–7.4)
NEUTROPHILS NFR BLD AUTO: 80.8 % — HIGH (ref 43–77)
NRBC # BLD: 0 /100 WBCS — SIGNIFICANT CHANGE UP (ref 0–0)
NRBC # FLD: 0.02 K/UL — HIGH (ref 0–0)
PHOSPHATE SERPL-MCNC: 2.7 MG/DL — SIGNIFICANT CHANGE UP (ref 2.5–4.5)
PLATELET # BLD AUTO: 334 K/UL — SIGNIFICANT CHANGE UP (ref 150–400)
POTASSIUM SERPL-MCNC: 3.8 MMOL/L — SIGNIFICANT CHANGE UP (ref 3.5–5.3)
POTASSIUM SERPL-SCNC: 3.8 MMOL/L — SIGNIFICANT CHANGE UP (ref 3.5–5.3)
RBC # BLD: 3.86 M/UL — LOW (ref 4.2–5.8)
RBC # FLD: 20.2 % — HIGH (ref 10.3–14.5)
SODIUM SERPL-SCNC: 133 MMOL/L — LOW (ref 135–145)
VANCOMYCIN FLD-MCNC: 18.6 UG/ML — SIGNIFICANT CHANGE UP
WBC # BLD: 10.08 K/UL — SIGNIFICANT CHANGE UP (ref 3.8–10.5)
WBC # FLD AUTO: 10.08 K/UL — SIGNIFICANT CHANGE UP (ref 3.8–10.5)

## 2024-05-19 PROCEDURE — 99223 1ST HOSP IP/OBS HIGH 75: CPT

## 2024-05-19 RX ADMIN — CARVEDILOL PHOSPHATE 12.5 MILLIGRAM(S): 80 CAPSULE, EXTENDED RELEASE ORAL at 20:57

## 2024-05-19 RX ADMIN — PIPERACILLIN AND TAZOBACTAM 25 GRAM(S): 4; .5 INJECTION, POWDER, LYOPHILIZED, FOR SOLUTION INTRAVENOUS at 05:43

## 2024-05-19 RX ADMIN — Medication 650 MILLIGRAM(S): at 05:43

## 2024-05-19 RX ADMIN — CARVEDILOL PHOSPHATE 12.5 MILLIGRAM(S): 80 CAPSULE, EXTENDED RELEASE ORAL at 07:32

## 2024-05-19 RX ADMIN — Medication 100 MILLIGRAM(S): at 11:12

## 2024-05-19 RX ADMIN — Medication 6: at 11:24

## 2024-05-19 RX ADMIN — HEPARIN SODIUM 1400 UNIT(S)/HR: 5000 INJECTION INTRAVENOUS; SUBCUTANEOUS at 07:42

## 2024-05-19 RX ADMIN — Medication 3 MILLILITER(S): at 08:55

## 2024-05-19 RX ADMIN — HUMAN INSULIN 4 UNIT(S): 100 INJECTION, SUSPENSION SUBCUTANEOUS at 05:50

## 2024-05-19 RX ADMIN — Medication 100 MILLIGRAM(S): at 04:59

## 2024-05-19 RX ADMIN — HEPARIN SODIUM 1300 UNIT(S)/HR: 5000 INJECTION INTRAVENOUS; SUBCUTANEOUS at 14:57

## 2024-05-19 RX ADMIN — SODIUM CHLORIDE 4 MILLILITER(S): 9 INJECTION INTRAMUSCULAR; INTRAVENOUS; SUBCUTANEOUS at 08:55

## 2024-05-19 RX ADMIN — Medication 650 MILLIGRAM(S): at 21:46

## 2024-05-19 RX ADMIN — ATORVASTATIN CALCIUM 20 MILLIGRAM(S): 80 TABLET, FILM COATED ORAL at 21:46

## 2024-05-19 RX ADMIN — Medication 100 MILLIGRAM(S): at 20:57

## 2024-05-19 RX ADMIN — SEVELAMER CARBONATE 1600 MILLIGRAM(S): 2400 POWDER, FOR SUSPENSION ORAL at 11:11

## 2024-05-19 RX ADMIN — PIPERACILLIN AND TAZOBACTAM 25 GRAM(S): 4; .5 INJECTION, POWDER, LYOPHILIZED, FOR SOLUTION INTRAVENOUS at 17:44

## 2024-05-19 RX ADMIN — CHLORHEXIDINE GLUCONATE 15 MILLILITER(S): 213 SOLUTION TOPICAL at 05:43

## 2024-05-19 RX ADMIN — HUMAN INSULIN 4 UNIT(S): 100 INJECTION, SUSPENSION SUBCUTANEOUS at 17:45

## 2024-05-19 RX ADMIN — Medication 650 MILLIGRAM(S): at 11:12

## 2024-05-19 RX ADMIN — SODIUM CHLORIDE 4 MILLILITER(S): 9 INJECTION INTRAMUSCULAR; INTRAVENOUS; SUBCUTANEOUS at 20:27

## 2024-05-19 RX ADMIN — HUMAN INSULIN 4 UNIT(S): 100 INJECTION, SUSPENSION SUBCUTANEOUS at 11:25

## 2024-05-19 RX ADMIN — Medication 3 MILLILITER(S): at 20:27

## 2024-05-19 RX ADMIN — SEVELAMER CARBONATE 1600 MILLIGRAM(S): 2400 POWDER, FOR SUSPENSION ORAL at 05:43

## 2024-05-19 RX ADMIN — SEVELAMER CARBONATE 1600 MILLIGRAM(S): 2400 POWDER, FOR SUSPENSION ORAL at 21:46

## 2024-05-19 RX ADMIN — Medication 4: at 05:50

## 2024-05-19 RX ADMIN — CHLORHEXIDINE GLUCONATE 15 MILLILITER(S): 213 SOLUTION TOPICAL at 17:46

## 2024-05-19 RX ADMIN — Medication 300 MILLIGRAM(S): at 11:12

## 2024-05-19 RX ADMIN — Medication 81 MILLIGRAM(S): at 11:12

## 2024-05-19 RX ADMIN — HEPARIN SODIUM 1300 UNIT(S)/HR: 5000 INJECTION INTRAVENOUS; SUBCUTANEOUS at 19:25

## 2024-05-19 RX ADMIN — CHLORHEXIDINE GLUCONATE 1 APPLICATION(S): 213 SOLUTION TOPICAL at 05:45

## 2024-05-19 NOTE — PHYSICAL THERAPY INITIAL EVALUATION ADULT - PRECAUTIONS/LIMITATIONS, REHAB EVAL
oxygen therapy device and L/min
cardiac precautions/fall precautions/oxygen therapy device and L/min

## 2024-05-19 NOTE — PHYSICAL THERAPY INITIAL EVALUATION ADULT - ADDITIONAL COMMENTS
Prior level of function gathered from family at bedside. Pt lives with family in a house, 4 steps to enter and a flight inside. Prior to admission pt was fully independent in ADLs and ambulation without device.    Following evaluation, pt was left semireclined in bed in no distress, all lines in tact, call bell in reach.
Prior level of function gathered from previous PT note. Pt lives with family in a house, 4 steps to enter and a flight inside. Prior to admission pt was fully independent in ADLs and ambulation without device.  Patient performed active assistive ROM of both UE / LE in functional plains with 10 repetition.

## 2024-05-19 NOTE — PROGRESS NOTE ADULT - SUBJECTIVE AND OBJECTIVE BOX
CHIEF COMPLAINT: Patient is a 73y old  Male who presents with a chief complaint of Unstable angina, abn EKG (19 May 2024 07:38)      INTERVAL EVENTS:    REVIEW OF SYSTEMS: Seen by bedside during AM rounds and     Mode: AC/ CMV (Assist Control/ Continuous Mandatory Ventilation), RR (machine): 12, TV (machine): 500, FiO2: 30, PEEP: 5, ITime: 0.8, MAP: 9, PIP: 22      OBJECTIVE:  ICU Vital Signs Last 24 Hrs  T(C): 37.5 (19 May 2024 04:00), Max: 37.5 (19 May 2024 04:00)  T(F): 99.5 (19 May 2024 04:00), Max: 99.5 (19 May 2024 04:00)  HR: 67 (19 May 2024 08:56) (66 - 71)  BP: 157/57 (19 May 2024 04:00) (115/52 - 157/57)  BP(mean): 81 (19 May 2024 04:00) (69 - 81)  ABP: --  ABP(mean): --  RR: 20 (19 May 2024 04:00) (12 - 20)  SpO2: 99% (19 May 2024 08:56) (99% - 100%)    O2 Parameters below as of 19 May 2024 08:56  Patient On (Oxygen Delivery Method): ventilator          Mode: AC/ CMV (Assist Control/ Continuous Mandatory Ventilation), RR (machine): 12, TV (machine): 500, FiO2: 30, PEEP: 5, ITime: 0.8, MAP: 9, PIP: 22    05-18 @ 07:01  -  05-19 @ 07:00  --------------------------------------------------------  IN: 722 mL / OUT: 0 mL / NET: 722 mL      CAPILLARY BLOOD GLUCOSE      POCT Blood Glucose.: 233 mg/dL (19 May 2024 05:48)      HOSPITAL MEDICATIONS:  MEDICATIONS  (STANDING):  albuterol/ipratropium for Nebulization 3 milliLiter(s) Nebulizer every 12 hours  aspirin  chewable 81 milliGRAM(s) Oral daily  atorvastatin 20 milliGRAM(s) Oral at bedtime  carvedilol 12.5 milliGRAM(s) Oral every 12 hours  chlorhexidine 0.12% Liquid 15 milliLiter(s) Oral Mucosa every 12 hours  chlorhexidine 2% Cloths 1 Application(s) Topical <User Schedule>  dextrose 10% Bolus 125 milliLiter(s) IV Bolus once  dextrose 5%. 1000 milliLiter(s) (100 mL/Hr) IV Continuous <Continuous>  dextrose 5%. 1000 milliLiter(s) (50 mL/Hr) IV Continuous <Continuous>  dextrose 50% Injectable 12.5 Gram(s) IV Push once  dextrose 50% Injectable 25 Gram(s) IV Push once  epoetin reilly (EPOGEN) Injectable 52883 Unit(s) IV Push <User Schedule>  ferrous    sulfate Liquid 300 milliGRAM(s) Enteral Tube daily  glucagon  Injectable 1 milliGRAM(s) IntraMuscular once  heparin  Infusion. 1100 Unit(s)/Hr (11 mL/Hr) IV Continuous <Continuous>  hydrALAZINE 100 milliGRAM(s) Oral three times a day  insulin lispro (ADMELOG) corrective regimen sliding scale   SubCutaneous every 6 hours  insulin NPH human recombinant 4 Unit(s) SubCutaneous every 6 hours  piperacillin/tazobactam IVPB.. 3.375 Gram(s) IV Intermittent every 12 hours  sevelamer carbonate Powder 1600 milliGRAM(s) Oral every 8 hours  sodium bicarbonate 650 milliGRAM(s) Oral every 8 hours  sodium chloride 3%  Inhalation 4 milliLiter(s) Inhalation every 12 hours    MEDICATIONS  (PRN):  acetaminophen     Tablet .. 650 milliGRAM(s) Oral every 6 hours PRN Temp greater or equal to 38C (100.4F), Mild Pain (1 - 3)  dextrose Oral Gel 15 Gram(s) Oral once PRN Blood Glucose LESS THAN 70 milliGRAM(s)/deciliter  melatonin 3 milliGRAM(s) Oral at bedtime PRN Insomnia  sodium chloride 0.9% lock flush 10 milliLiter(s) IV Push every 1 hour PRN Pre/post blood products, medications, blood draw, and to maintain line patency      PHYSICAL EXAMINATION    LABS:                        8.1    10.08 )-----------( 334      ( 19 May 2024 05:50 )             23.9     05-19    133<L>  |  92<L>  |  51<H>  ----------------------------<  193<H>  3.8   |  25  |  5.08<H>    Ca    7.9<L>      19 May 2024 05:50  Phos  2.7     05-19  Mg     2.50     05-19      PT/INR - ( 18 May 2024 03:45 )   PT: 14.0 sec;   INR: 1.25 ratio         PTT - ( 19 May 2024 05:50 )  PTT:48.4 sec  Urinalysis Basic - ( 19 May 2024 05:50 )    Color: x / Appearance: x / SG: x / pH: x  Gluc: 193 mg/dL / Ketone: x  / Bili: x / Urobili: x   Blood: x / Protein: x / Nitrite: x   Leuk Esterase: x / RBC: x / WBC x   Sq Epi: x / Non Sq Epi: x / Bacteria: x            PAST MEDICAL & SURGICAL HISTORY:  Diabetes      Benign essential HTN      HLD (hyperlipidemia)      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 CHIEF COMPLAINT: Patient is a 73y old  Male who presents with a chief complaint of Unstable angina, abn EKG (19 May 2024 07:38)      INTERVAL EVENTS:  - No interval events overnight   - Tolerating PS today   - Mild oozing from trach site     REVIEW OF SYSTEMS: Seen by bedside during AM rounds and denies pain or SOB    Mode: AC/ CMV (Assist Control/ Continuous Mandatory Ventilation), RR (machine): 12, TV (machine): 500, FiO2: 30, PEEP: 5, ITime: 0.8, MAP: 9, PIP: 22      OBJECTIVE:  ICU Vital Signs Last 24 Hrs  T(C): 37.5 (19 May 2024 04:00), Max: 37.5 (19 May 2024 04:00)  T(F): 99.5 (19 May 2024 04:00), Max: 99.5 (19 May 2024 04:00)  HR: 67 (19 May 2024 08:56) (66 - 71)  BP: 157/57 (19 May 2024 04:00) (115/52 - 157/57)  BP(mean): 81 (19 May 2024 04:00) (69 - 81)  ABP: --  ABP(mean): --  RR: 20 (19 May 2024 04:00) (12 - 20)  SpO2: 99% (19 May 2024 08:56) (99% - 100%)    O2 Parameters below as of 19 May 2024 08:56  Patient On (Oxygen Delivery Method): ventilator          Mode: AC/ CMV (Assist Control/ Continuous Mandatory Ventilation), RR (machine): 12, TV (machine): 500, FiO2: 30, PEEP: 5, ITime: 0.8, MAP: 9, PIP: 22    05-18 @ 07:01  -  05-19 @ 07:00  --------------------------------------------------------  IN: 722 mL / OUT: 0 mL / NET: 722 mL      CAPILLARY BLOOD GLUCOSE  POCT Blood Glucose.: 233 mg/dL (19 May 2024 05:48)      HOSPITAL MEDICATIONS:  MEDICATIONS  (STANDING):  albuterol/ipratropium for Nebulization 3 milliLiter(s) Nebulizer every 12 hours  aspirin  chewable 81 milliGRAM(s) Oral daily  atorvastatin 20 milliGRAM(s) Oral at bedtime  carvedilol 12.5 milliGRAM(s) Oral every 12 hours  chlorhexidine 0.12% Liquid 15 milliLiter(s) Oral Mucosa every 12 hours  chlorhexidine 2% Cloths 1 Application(s) Topical <User Schedule>  dextrose 10% Bolus 125 milliLiter(s) IV Bolus once  dextrose 5%. 1000 milliLiter(s) (100 mL/Hr) IV Continuous <Continuous>  dextrose 5%. 1000 milliLiter(s) (50 mL/Hr) IV Continuous <Continuous>  dextrose 50% Injectable 12.5 Gram(s) IV Push once  dextrose 50% Injectable 25 Gram(s) IV Push once  epoetin reilly (EPOGEN) Injectable 82539 Unit(s) IV Push <User Schedule>  ferrous    sulfate Liquid 300 milliGRAM(s) Enteral Tube daily  glucagon  Injectable 1 milliGRAM(s) IntraMuscular once  heparin  Infusion. 1100 Unit(s)/Hr (11 mL/Hr) IV Continuous <Continuous>  hydrALAZINE 100 milliGRAM(s) Oral three times a day  insulin lispro (ADMELOG) corrective regimen sliding scale   SubCutaneous every 6 hours  insulin NPH human recombinant 4 Unit(s) SubCutaneous every 6 hours  piperacillin/tazobactam IVPB.. 3.375 Gram(s) IV Intermittent every 12 hours  sevelamer carbonate Powder 1600 milliGRAM(s) Oral every 8 hours  sodium bicarbonate 650 milliGRAM(s) Oral every 8 hours  sodium chloride 3%  Inhalation 4 milliLiter(s) Inhalation every 12 hours    MEDICATIONS  (PRN):  acetaminophen     Tablet .. 650 milliGRAM(s) Oral every 6 hours PRN Temp greater or equal to 38C (100.4F), Mild Pain (1 - 3)  dextrose Oral Gel 15 Gram(s) Oral once PRN Blood Glucose LESS THAN 70 milliGRAM(s)/deciliter  melatonin 3 milliGRAM(s) Oral at bedtime PRN Insomnia  sodium chloride 0.9% lock flush 10 milliLiter(s) IV Push every 1 hour PRN Pre/post blood products, medications, blood draw, and to maintain line patency      PHYSICAL EXAMINATION  General: NAD   HEENT: Trach present with mild bright red bloody oozing. Surgicell applied.   Cards: S1/S2, no murmurs   Pulm: Course vent sounds bilaterally. No wheezes.   Abdomen: Soft, nondistended and nontender. BS (+) PEG present.   Extremities: No pedal edema. THAI of BL upper and lower extremities, however limited by severe weakness   Neurology: Awake and able to follow commands and mouth simple words with no acute focal neurological deficits     LABS:                        8.1    10.08 )-----------( 334      ( 19 May 2024 05:50 )             23.9     05-19    133<L>  |  92<L>  |  51<H>  ----------------------------<  193<H>  3.8   |  25  |  5.08<H>    Ca    7.9<L>      19 May 2024 05:50  Phos  2.7     05-19  Mg     2.50     05-19      PT/INR - ( 18 May 2024 03:45 )   PT: 14.0 sec;   INR: 1.25 ratio         PTT - ( 19 May 2024 05:50 )  PTT:48.4 sec    Urinalysis Basic - ( 19 May 2024 05:50 )  Color: x / Appearance: x / SG: x / pH: x  Gluc: 193 mg/dL / Ketone: x  / Bili: x / Urobili: x   Blood: x / Protein: x / Nitrite: x   Leuk Esterase: x / RBC: x / WBC x   Sq Epi: x / Non Sq Epi: x / Bacteria: x            PAST MEDICAL & SURGICAL HISTORY:  Diabetes      Benign essential HTN      HLD (hyperlipidemia)      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

## 2024-05-19 NOTE — CHART NOTE - NSCHARTNOTEFT_GEN_A_CORE
[facility] PA NOTE     Called by RN for patient with mild tracheostomy stoma oozing of bright red blood. Seen by bedside and site examined with minimal oozing. Surgicell applied, heparin GTT continued, and will monitor for now.     JANET Mercedes, PA-C  Department of Medicine/ [facility]  In house [facility] [id number]  In house Medicine [facility] [id number]  Reachable via teams

## 2024-05-19 NOTE — PHYSICAL THERAPY INITIAL EVALUATION ADULT - CRITERIA FOR SKILLED THERAPEUTIC INTERVENTIONS
impairments found/rehab potential/therapy frequency/predicted duration of therapy intervention/anticipated discharge recommendation
impairments found/functional limitations in following categories

## 2024-05-19 NOTE — PROGRESS NOTE ADULT - ASSESSMENT
74 YO M with PMHx of ESRD on HD MWF, HTN, and 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 Baclofen started and course complicated by AMS second to baclofen toxicity requiring intubation and MICU transfer. Course further complicated by LEFT talya and cerebellum stroke, R AVF stenosis, aspiration and B Cereus bacteremia and RLE DVT. s/p trach and transferred to RCU 5/16    NEUROLOGY  # AMS   - MRI HEAD (5/6) with punctate foci in the left talya and left cerebellum with concern for acute infarct.   - MRA HEAD and NECK (5/6) with no large vessel occlusion or stenosis.  - Continue on aspirin and hold DAPT for now pending procedures   - Continue on lipitor for medical management   - Supportive care     CARDIOVASCULAR  # CP with NSTEMI vs demand ischemia with HTN   - Admitted for CP and HTN with peaked T WAVES and ECG with ST deviation on admission   - Troponins elevated on admission with negative CKMB   - TTE (4/30) with EF 72, normal LVRVSF, and no regional wall motion abnormalities   - Case discussed with cardiology and no acute need for cath. DAPT loaded on 5/4 and plan for medical management with ASA, lipitor and heparin GTT.     # Septic vs vasoplegic shock  # Hx of HTN   - Home BP medications held and pressors weaned off   - Continue on coreg 12.5 and hydral 100   - Monitor BP with goal 150/90s    RESPIRATORY  # Respiratory failure   - AMS noted with baclofen toxicity requiring intubation   - Attempted extubation in MICU however course complicated by aspiration and prolonged course and now s/p tracheostomy with IP on 5/14  - Continue on vent 12/500/5/30 and able to tolerate SBT 10/5 x several hours but limited by weakness   - Continue on nebs and HTS Q12H for now     GI  # Dysphagia   - s/p surgical PEG 5/17   - Continue on tube feeds via PEG     RENAL  # ESRD on HD MWF with R BCF  # Fistula stenosis ?  - VA AVF (5/2) with Right radiocephalic arteriovenous fistula has no hemodynamically significant stenosis present at the anastomotic site ( cm/sec, EDV 49 cm/sec). The usable segment is partially thrombosed with minimal patency.  - Required R FEMORAL line on medicine, then removed on 5/2, and replaced with R IJ SHILEY on 5/3 for CRRT then converted back to iHD MWF   - R SHILEY removed on 5/10 second to bacteremia and replaced on 5/13   - Continue on HD MWF per HD   - Continue on renvela 1600   - Continue on HCO3 650 tabs   - Plan for fistulogram next week with vascular    INFECTIOUS DISEASE  # Aspiration   - Recurrent fever spike 5/17 overnight and CXR with RLL opacity   - BCx and SCx sent and pending.   - Zosyn started empirically (5/18 - )     # B Cereus Bacteremia   - Course complicated by fevers and aspiration event   - MRSA (5/1) negative   - BCx (5/2) negative   - SCx (5/4) and BAL (5/12) negative   - BCx (5/10) with bacillus cereus and cleared on (5/12).   - Case discussed with ID and plans to continue on vanco through 5/21 x 10 day course. Dose per level.     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 Baclofen started and course complicated by AMS second to baclofen toxicity requiring intubation and MICU transfer. Course further complicated by LEFT talya and cerebellum stroke, R AVF stenosis, aspiration and B Cereus bacteremia and RLE DVT. s/p trach and transferred to RCU 5/16    NEUROLOGY  # AMS   - MRI HEAD (5/6) with punctate foci in the left talya and left cerebellum with concern for acute infarct.   - MRA HEAD and NECK (5/6) with no large vessel occlusion or stenosis.  - Continue on aspirin and hold DAPT for now pending procedures   - Continue on lipitor for medical management   - Supportive care     CARDIOVASCULAR  # CP with NSTEMI vs demand ischemia with HTN   - Admitted for CP and HTN with peaked T WAVES and ECG with ST deviation on admission   - Troponins elevated on admission with negative CKMB   - TTE (4/30) with EF 72, normal LVRVSF, and no regional wall motion abnormalities   - Case discussed with cardiology and no acute need for cath. DAPT loaded on 5/4 and plan for medical management with ASA, lipitor and heparin GTT.     # Septic vs vasoplegic shock  # Hx of HTN   - Home BP medications held and pressors weaned off   - Continue on coreg 12.5 and hydral 100   - Monitor BP with goal 150/90s    RESPIRATORY  # Respiratory failure   - AMS noted with baclofen toxicity requiring intubation   - Attempted extubation in MICU however course complicated by aspiration and prolonged course and now s/p tracheostomy with IP on 5/14  - Continue on vent 12/500/5/30 and able to tolerate SBT 10/5 x several hours but limited by weakness   - Continue on nebs and HTS Q12H for now     GI  # Dysphagia   - s/p surgical PEG 5/17   - Continue on tube feeds via PEG     RENAL  # ESRD on HD MWF with R BCF  # Fistula stenosis ?  - VA AVF (5/2) with Right radiocephalic arteriovenous fistula has no hemodynamically significant stenosis present at the anastomotic site ( cm/sec, EDV 49 cm/sec). The usable segment is partially thrombosed with minimal patency.  - Required R FEMORAL line on medicine, then removed on 5/2, and replaced with R IJ SHILEY on 5/3 for CRRT then converted back to iHD MWF   - R SHILEY removed on 5/10 second to bacteremia and replaced on 5/13   - Continue on HD MWF per HD   - Continue on renvela 1600   - Continue on HCO3 650 tabs   - Plan for fistulogram next week with vascular    INFECTIOUS DISEASE  # Aspiration   - Recurrent fever spike 5/17 overnight and CXR with RLL opacity   - BCx and SCx negative, however fever spikes improved on ABX   - Zosyn continued empirically (5/18 - )     # B Cereus Bacteremia   - Course complicated by fevers and aspiration event   - MRSA (5/1) negative   - BCx (5/2) negative   - SCx (5/4) and BAL (5/12) negative   - BCx (5/10) with bacillus cereus and cleared on (5/12).   - Case discussed with ID and plans to continue on vanco through 5/21 x 10 day course. Dose per level.     HEME  # AOCD with ESRD   - Anemia panel with AOCD and low # sat   - Transfused on 5/16   - EPO 10K continued on TIW   - Ferrous supplement added   - Monitor HH     VASCULAR   # RLE DVT   - CT AP (5/12) with nonocclusive RIGHT common iliac vein deep venous thrombosis  - Continue on heparin GTT   - RLE precautions     ENDOCRINE  # IDDM2 A1C 6.9  - Continue on NPH 4U and ISS   - Monitor and adjust insulin based on FS     SKIN  - R FEMORAL (5/1-5/2)   - R IJ SHILEY (5/3-5/10)   - R IJ SHILEY (5/13 - )     ETHICS/ GOC    - FULL CODE     DISPO - Vent facility

## 2024-05-19 NOTE — PROGRESS NOTE ADULT - SUBJECTIVE AND OBJECTIVE BOX
Patient is a 73y Male     Patient is a 73y old  Male who presents with a chief complaint of Unstable angina, abn EKG (18 May 2024 22:53)      HPI:  73-year-old male past medical history hypertension, ESRD on dialysis Monday Wednesday Friday, diabetes insulin-dependent presents with hiccups patient endorses persistent hiccups for the last 2 days. found to have peaked T waves, a new finding. denies dizziness, N/V, denies CP, palps, abd pain (29 Apr 2024 21:15)      PAST MEDICAL & SURGICAL HISTORY:  Diabetes      Benign essential HTN      HLD (hyperlipidemia)      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      ESRD on hemodialysis      Arteriovenous fistula          MEDICATIONS  (STANDING):  albuterol/ipratropium for Nebulization 3 milliLiter(s) Nebulizer every 12 hours  aspirin  chewable 81 milliGRAM(s) Oral daily  atorvastatin 20 milliGRAM(s) Oral at bedtime  carvedilol 12.5 milliGRAM(s) Oral every 12 hours  chlorhexidine 0.12% Liquid 15 milliLiter(s) Oral Mucosa every 12 hours  chlorhexidine 2% Cloths 1 Application(s) Topical <User Schedule>  dextrose 10% Bolus 125 milliLiter(s) IV Bolus once  dextrose 5%. 1000 milliLiter(s) (50 mL/Hr) IV Continuous <Continuous>  dextrose 5%. 1000 milliLiter(s) (100 mL/Hr) IV Continuous <Continuous>  dextrose 50% Injectable 12.5 Gram(s) IV Push once  dextrose 50% Injectable 25 Gram(s) IV Push once  epoetin reilly (EPOGEN) Injectable 06124 Unit(s) IV Push <User Schedule>  ferrous    sulfate Liquid 300 milliGRAM(s) Enteral Tube daily  glucagon  Injectable 1 milliGRAM(s) IntraMuscular once  heparin  Infusion. 1100 Unit(s)/Hr (11 mL/Hr) IV Continuous <Continuous>  hydrALAZINE 100 milliGRAM(s) Oral three times a day  insulin lispro (ADMELOG) corrective regimen sliding scale   SubCutaneous every 6 hours  insulin NPH human recombinant 4 Unit(s) SubCutaneous every 6 hours  piperacillin/tazobactam IVPB.. 3.375 Gram(s) IV Intermittent every 12 hours  sevelamer carbonate Powder 1600 milliGRAM(s) Oral every 8 hours  sodium bicarbonate 650 milliGRAM(s) Oral every 8 hours  sodium chloride 3%  Inhalation 4 milliLiter(s) Inhalation every 12 hours      Allergies    No Known Allergies    Intolerances        SOCIAL HISTORY:  Denies ETOh,Smoking,     FAMILY HISTORY:  FHx: diabetes mellitus (Father, Aunt)        REVIEW OF SYSTEMS:    CONSTITUTIONAL: No weakness, fevers or chills  EYES/ENT: No visual changes;  No vertigo or throat pain   NECK: No pain or stiffness  RESPIRATORY: No cough, wheezing, hemoptysis; No shortness of breath  CARDIOVASCULAR: No chest pain or palpitations  GASTROINTESTINAL: No abdominal or epigastric pain. No nausea, vomiting, or hematemesis; No diarrhea or constipation. No melena or hematochezia.  GENITOURINARY: No dysuria, frequency or hematuria  NEUROLOGICAL: No numbness or weakness  SKIN: No itching, burning, rashes, or lesions   All other review of systems is negative unless indicated above.    VITAL:  T(C): , Max: 37.5 (05-19-24 @ 04:00)  T(F): , Max: 99.5 (05-19-24 @ 04:00)  HR: 66 (05-19-24 @ 04:45)  BP: 157/57 (05-19-24 @ 04:00)  BP(mean): 81 (05-19-24 @ 04:00)  RR: 20 (05-19-24 @ 04:00)  SpO2: 100% (05-19-24 @ 04:45)  Wt(kg): --    I and O's:    05-17 @ 07:01  -  05-18 @ 07:00  --------------------------------------------------------  IN: 400 mL / OUT: 1900 mL / NET: -1500 mL    05-18 @ 07:01  -  05-19 @ 07:00  --------------------------------------------------------  IN: 722 mL / OUT: 0 mL / NET: 722 mL          PHYSICAL EXAM:    Constitutional: NAD  HEENT: PERRLA,   Neck: No JVD  Respiratory: CTA B/L  Cardiovascular: S1 and S2  Gastrointestinal: BS+, soft, NT/ND  Extremities: No peripheral edema  Neurological: A/O x 3, no focal deficits  Psychiatric: Normal mood, normal affect  : No Abbasi  Skin: No rashes  Access: Not applicable  Back: No CVA tenderness    LABS:                        8.1    10.08 )-----------( 334      ( 19 May 2024 05:50 )             23.9     05-18    137  |  96<L>  |  32<H>  ----------------------------<  143<H>  4.0   |  26  |  3.64<H>    Ca    8.3<L>      18 May 2024 03:45  Phos  4.4     05-18  Mg     2.30     05-18            RADIOLOGY & ADDITIONAL STUDIES:

## 2024-05-19 NOTE — PROGRESS NOTE ADULT - NS ATTEND AMEND GEN_ALL_CORE FT
73-year-old male with a history of ESRD on dialysis, hypertension and diabetes who was admitted with demand ischemia versus NSTEMI.  Course was complicated secondary to baclofen toxicity requiring intubation and MICU transfer.  Course complicated by left talya and cerebellum stroke, aspiration and be serious bacteremia and right lower extremity DVT.     - continue heparin gtt  - on VC AC, tolerating PST intermittently   - afebrile currently, on Zosyn, Vanc.   - awaiting fistulogram per vascular

## 2024-05-19 NOTE — PROGRESS NOTE ADULT - SUBJECTIVE AND OBJECTIVE BOX
Vascular Surgery Progress Note     Subjective:  Patient seen and examined on AM rounds. Patient remains on vent via trach, awake and alert, following commands.      Vital Signs:  Vital Signs Last 24 Hrs  T(C): 37.3 (19 May 2024 08:00), Max: 37.5 (19 May 2024 04:00)  T(F): 99.1 (19 May 2024 08:00), Max: 99.5 (19 May 2024 04:00)  HR: 67 (19 May 2024 08:56) (66 - 71)  BP: 146/55 (19 May 2024 08:00) (115/52 - 157/57)  BP(mean): 80 (19 May 2024 08:00) (69 - 81)  RR: 18 (19 May 2024 08:00) (12 - 20)  SpO2: 99% (19 May 2024 08:56) (99% - 100%)    Parameters below as of 19 May 2024 08:56  Patient On (Oxygen Delivery Method): ventilator        CAPILLARY BLOOD GLUCOSE      POCT Blood Glucose.: 233 mg/dL (19 May 2024 05:48)  POCT Blood Glucose.: 242 mg/dL (18 May 2024 23:23)  POCT Blood Glucose.: 198 mg/dL (18 May 2024 16:49)  POCT Blood Glucose.: 175 mg/dL (18 May 2024 11:15)      I&O's Detail    18 May 2024 07:01  -  19 May 2024 07:00  --------------------------------------------------------  IN:    Heparin Infusion: 182 mL    Nepro: 540 mL  Total IN: 722 mL    OUT:  Total OUT: 0 mL    Total NET: 722 mL            Physical Exam:  General: awake and alert  HEENT: Normocephalic atraumatic  Respiratory: s/p trach, on vent  Cardio: regular rate  Vascular: extremities are warm and well perfused, palpable b/l radial pulses, R AVF w/ palpable thrill, R IJ shiley in place      Labs:    05-19    133<L>  |  92<L>  |  51<H>  ----------------------------<  193<H>  3.8   |  25  |  5.08<H>    Ca    7.9<L>      19 May 2024 05:50  Phos  2.7     05-19  Mg     2.50     05-19                              8.1    10.08 )-----------( 334      ( 19 May 2024 05:50 )             23.9     PT/INR - ( 18 May 2024 03:45 )   PT: 14.0 sec;   INR: 1.25 ratio         PTT - ( 19 May 2024 05:50 )  PTT:48.4 sec

## 2024-05-19 NOTE — PHYSICAL THERAPY INITIAL EVALUATION ADULT - DIAGNOSIS, PT EVAL
left talya and left cerebellum
Generalized weakness, impaired balance, impaired mobility, deconditioning.

## 2024-05-19 NOTE — PHYSICAL THERAPY INITIAL EVALUATION ADULT - GENERAL OBSERVATIONS, REHAB EVAL
Patient seen semi supine in bed, connected to tach, multiple medical lines.
Pt encountered semireclined in bed in no distress, +telemetry monitor, +HFNC, +IV. SpO2 = 100%, heart rate = 85 bpm.

## 2024-05-19 NOTE — PROGRESS NOTE ADULT - SUBJECTIVE AND OBJECTIVE BOX
Patient is a 73y old  Male who presents with a chief complaint of Unstable angina, abn EKG (19 May 2024 21:17)      SUBJECTIVE / OVERNIGHT EVENTS: afebrile since Zosyn, BP is stable, on PEG feeds, trached on a vent, RUE fistulogram next week w vascular    MEDICATIONS  (STANDING):  albuterol/ipratropium for Nebulization 3 milliLiter(s) Nebulizer every 12 hours  aspirin  chewable 81 milliGRAM(s) Oral daily  atorvastatin 20 milliGRAM(s) Oral at bedtime  carvedilol 12.5 milliGRAM(s) Oral every 12 hours  chlorhexidine 0.12% Liquid 15 milliLiter(s) Oral Mucosa every 12 hours  chlorhexidine 2% Cloths 1 Application(s) Topical <User Schedule>  dextrose 10% Bolus 125 milliLiter(s) IV Bolus once  dextrose 5%. 1000 milliLiter(s) (100 mL/Hr) IV Continuous <Continuous>  dextrose 5%. 1000 milliLiter(s) (50 mL/Hr) IV Continuous <Continuous>  dextrose 50% Injectable 12.5 Gram(s) IV Push once  dextrose 50% Injectable 25 Gram(s) IV Push once  epoetin reilly (EPOGEN) Injectable 99040 Unit(s) IV Push <User Schedule>  ferrous    sulfate Liquid 300 milliGRAM(s) Enteral Tube daily  glucagon  Injectable 1 milliGRAM(s) IntraMuscular once  heparin  Infusion. 1100 Unit(s)/Hr (11 mL/Hr) IV Continuous <Continuous>  hydrALAZINE 100 milliGRAM(s) Oral three times a day  insulin lispro (ADMELOG) corrective regimen sliding scale   SubCutaneous every 6 hours  insulin NPH human recombinant 4 Unit(s) SubCutaneous every 6 hours  piperacillin/tazobactam IVPB.. 3.375 Gram(s) IV Intermittent every 12 hours  sevelamer carbonate Powder 1600 milliGRAM(s) Oral every 8 hours  sodium bicarbonate 650 milliGRAM(s) Oral every 8 hours  sodium chloride 3%  Inhalation 4 milliLiter(s) Inhalation every 12 hours    MEDICATIONS  (PRN):  acetaminophen     Tablet .. 650 milliGRAM(s) Oral every 6 hours PRN Temp greater or equal to 38C (100.4F), Mild Pain (1 - 3)  dextrose Oral Gel 15 Gram(s) Oral once PRN Blood Glucose LESS THAN 70 milliGRAM(s)/deciliter  melatonin 3 milliGRAM(s) Oral at bedtime PRN Insomnia  sodium chloride 0.9% lock flush 10 milliLiter(s) IV Push every 1 hour PRN Pre/post blood products, medications, blood draw, and to maintain line patency      Vital Signs Last 24 Hrs  T(F): 97.5 (05-19-24 @ 20:00), Max: 99.5 (05-19-24 @ 04:00)  HR: 56 (05-19-24 @ 22:30) (52 - 70)  BP: 131/52 (05-19-24 @ 20:00) (131/52 - 159/52)  RR: 12 (05-19-24 @ 20:00) (12 - 20)  SpO2: 99% (05-19-24 @ 22:30) (98% - 100%)  Telemetry:   CAPILLARY BLOOD GLUCOSE      POCT Blood Glucose.: 140 mg/dL (19 May 2024 17:13)  POCT Blood Glucose.: 251 mg/dL (19 May 2024 11:21)  POCT Blood Glucose.: 233 mg/dL (19 May 2024 05:48)    I&O's Summary    18 May 2024 07:01  -  19 May 2024 07:00  --------------------------------------------------------  IN: 722 mL / OUT: 0 mL / NET: 722 mL    19 May 2024 07:01  -  19 May 2024 23:24  --------------------------------------------------------  IN: 90 mL / OUT: 0 mL / NET: 90 mL        PHYSICAL EXAM:  GENERAL: NAD, well-developed  HEAD:  Atraumatic, Normocephalic  EYES: EOMI, PERRLA, conjunctiva and sclera clear  NECK: Supple, No JVD  CHEST/LUNG: Clear to auscultation bilaterally; No wheeze  HEART: Regular rate and rhythm; No murmurs, rubs, or gallops  ABDOMEN: Soft, Nontender, Nondistended; Bowel sounds present  EXTREMITIES:  2+ Peripheral Pulses, No clubbing, cyanosis, or edema  PSYCH: AAOx3  NEUROLOGY: non-focal  SKIN: No rashes or lesions    LABS:                        8.1    10.08 )-----------( 334      ( 19 May 2024 05:50 )             23.9     05-19    133<L>  |  92<L>  |  51<H>  ----------------------------<  193<H>  3.8   |  25  |  5.08<H>    Ca    7.9<L>      19 May 2024 05:50  Phos  2.7     05-19  Mg     2.50     05-19      PT/INR - ( 18 May 2024 03:45 )   PT: 14.0 sec;   INR: 1.25 ratio         PTT - ( 19 May 2024 21:53 )  PTT:82.4 sec      Urinalysis Basic - ( 19 May 2024 05:50 )    Color: x / Appearance: x / SG: x / pH: x  Gluc: 193 mg/dL / Ketone: x  / Bili: x / Urobili: x   Blood: x / Protein: x / Nitrite: x   Leuk Esterase: x / RBC: x / WBC x   Sq Epi: x / Non Sq Epi: x / Bacteria: x        RADIOLOGY & ADDITIONAL TESTS:    Imaging Personally Reviewed:    Consultant(s) Notes Reviewed:      Care Discussed with Consultants/Other Providers:

## 2024-05-19 NOTE — PROGRESS NOTE ADULT - SUBJECTIVE AND OBJECTIVE BOX
Cardiovascular Disease Progress Note  Date of service: 24 @ 14:30    Overnight events: No acute events overnight.  Patient is in no distress.   Otherwise review of systems negative    Objective Findings:  T(C): 37.3 (24 @ 08:00), Max: 37.5 (24 @ 04:00)  HR: 65 (24 @ 11:01) (65 - 70)  BP: 146/55 (24 @ 08:00) (115/52 - 157/57)  RR: 18 (24 @ 08:00) (12 - 20)  SpO2: 100% (24 @ 11:01) (99% - 100%)  Wt(kg): --  Daily     Daily Weight in k.3 (19 May 2024 02:33)      Physical Exam:  Gen: NAD; Patient resting comfortably  HEENT: EOMI, Normocephalic/ atraumatic  CV: RRR, normal S1 + S2, no m/r/g  Lungs:  Trach  Abd: soft, non-tender; bowel sounds present  Ext: No edema; warm and well perfused    Telemetry: sinus     Laboratory Data:                        8.1    10.08 )-----------( 334      ( 19 May 2024 05:50 )             23.9         133<L>  |  92<L>  |  51<H>  ----------------------------<  193<H>  3.8   |  25  |  5.08<H>    Ca    7.9<L>      19 May 2024 05:50  Phos  2.7       Mg     2.50           PT/INR - ( 18 May 2024 03:45 )   PT: 14.0 sec;   INR: 1.25 ratio         PTT - ( 19 May 2024 05:50 )  PTT:48.4 sec          Inpatient Medications:  MEDICATIONS  (STANDING):  albuterol/ipratropium for Nebulization 3 milliLiter(s) Nebulizer every 12 hours  aspirin  chewable 81 milliGRAM(s) Oral daily  atorvastatin 20 milliGRAM(s) Oral at bedtime  carvedilol 12.5 milliGRAM(s) Oral every 12 hours  chlorhexidine 0.12% Liquid 15 milliLiter(s) Oral Mucosa every 12 hours  chlorhexidine 2% Cloths 1 Application(s) Topical <User Schedule>  dextrose 10% Bolus 125 milliLiter(s) IV Bolus once  dextrose 5%. 1000 milliLiter(s) (100 mL/Hr) IV Continuous <Continuous>  dextrose 5%. 1000 milliLiter(s) (50 mL/Hr) IV Continuous <Continuous>  dextrose 50% Injectable 12.5 Gram(s) IV Push once  dextrose 50% Injectable 25 Gram(s) IV Push once  epoetin reilly (EPOGEN) Injectable 30651 Unit(s) IV Push <User Schedule>  ferrous    sulfate Liquid 300 milliGRAM(s) Enteral Tube daily  glucagon  Injectable 1 milliGRAM(s) IntraMuscular once  heparin  Infusion. 1100 Unit(s)/Hr (11 mL/Hr) IV Continuous <Continuous>  hydrALAZINE 100 milliGRAM(s) Oral three times a day  insulin lispro (ADMELOG) corrective regimen sliding scale   SubCutaneous every 6 hours  insulin NPH human recombinant 4 Unit(s) SubCutaneous every 6 hours  piperacillin/tazobactam IVPB.. 3.375 Gram(s) IV Intermittent every 12 hours  sevelamer carbonate Powder 1600 milliGRAM(s) Oral every 8 hours  sodium bicarbonate 650 milliGRAM(s) Oral every 8 hours  sodium chloride 3%  Inhalation 4 milliLiter(s) Inhalation every 12 hours      Assessment:  73 year old man with HTN, HLD, T2DM on insulin, and ESRD on HD presents with supply demand ischemia and angina.    Plan of Care:    #Type II myocardial infarction-  Secondary to distributive shock earlier on admission.   The repeat echo shows no new wall motion abnormality.   I would not pursue cath at this time given recurrent aspiration and chronic respiratory failure s/p trach .   The patient is optimized from a cardiac standpoint to proceed with fistulogram.        #Sinus bradycardia-  No evidence of AV block on admission EKG or telemetry.  No indication for pacing at this time.     #HTN-  BP labile.  Monitor trends.     #ESRD-  Defer to renal     #DVT   - R common Iliac DVT  - Hep GTT  - AC management as per vascular team.      Over 55 minutes spent on total encounter; more than 50% of the visit was spent counseling and/or coordinating care by the attending physician.      Tico Bravo DO [facility]  Cardiovascular Disease  [phone]

## 2024-05-19 NOTE — PROGRESS NOTE ADULT - ASSESSMENT
73-year-old male past medical history hypertension, ESRD on dialysis Monday Wednesday Friday, diabetes insulin-dependent presents with hiccups patient endorses persistent hiccups for the last 2 days.   found to have peaked T waves, a new finding. denied dizziness, N/V, denies CP, palps, abd pain  Upon admission seen by CArd, renal and GI  found to have a distended GB prob 2/2 gastroparesis, was started on Reglan  has received 4 doses of baclofen since 4/30 2/2 hiccups, last dose on 5/1 at 5 am  had received Haldol for agitation at 11 pm on 4/30, AMS observed in pm of 5/1  AMS ongoing, RRT x 2 called  now transferred to MICU for encephalopathy requiring  airway protection on 5/2,  intubated for airway protection, was on  propofol and pressors. now off  toxicology consulted upon dx of encephalopathy, not impressed AMS 2/2 Haldol nor baclofen . AMS was 2/2 acute CVA   HD was not done timely until femoral shiley was placed 2/2 clotted RUE AVF. now removed and RIJ shiley placed on 5/3  has been nonverbal since pm 5/1.     MR brain 5/6 c/w L pontine and L cerebellar acute infarcts, no hemorrhage . as per neuro: embolic in nature.   encephalopathy probably 2/2 acute CVA, now follows commands appropriately off sedation.   no SZ focus on EEG,   off CRRT,  HD resumed as per renal.   remains intubated, now trached  off keppra  AC resumed, on Heparin drip for R common iliac DVT  completed 5 days of empiric ZOSYN on 5/7, shortly after became septic again after an extubation trial. bacteremia w B. cereus, on Vanc through 5/20 as per ID    s/p trache placement by pulm,   IR unable to find a window for G-J tube. PEG placed by surgery 5/17, resume feeds when cleared by surgery  HD via R IJ.  Tmax overnight( 5/18)  101, cxr c/w RLL PNA, now on Zosyn, blood Cx s sent. remains on Vanco for B. cereus bacteremia   leukocytosis resolved  EKG changes on 5/3 c/w NSTEMI loaded w DAPT , was  on Heparin drip x 48 hrs. rpt TTE is unchanged  ID, Neuro , renal, cardiology following.  clotted RUE AVF. fistulogram when medically stable. vascular following   HD via RIght IJ Shiley.   NGT in place in stomach.   LP done, no sign of meningitis.   NEUROLOGY     - CTH without acute findings   - LP done, no acute findings  - MR brain w acute infarcts: L talya and L cerebellum, nonhemorrhagic  - no Sz focus on EEG    CARDIOVASCULAR   - ptn never had CP. just peaked T waves. was awaiting ischemic study w nucl stress test  - TTE showing EF 72%, rpt unchanged  - EKG changes on 5/3, loaded w DAPT now on Heparin drip 2/2 DVT    GI  - PEG placed, npo w tube feeds  - Gastroparesis       RENAL   -  HD as per renal  - via R IJ shiley  -  fistula study of RUE  as per VAscular next week      INFECTIOUS DISEASE     completed a course of Zosyn, then became septic, bacteremic a B. cereus, completed 3 days of Meropenem, now on vanc through 5/20  on Zosyn since 5/18 for RLL PNA, afebrile    ENDOCRINE   - DM  - cw ISS    GOC:  Full code

## 2024-05-19 NOTE — PROGRESS NOTE ADULT - SUBJECTIVE AND OBJECTIVE BOX
JIMMY BLAND  73y  Patient is a 73y old  Male who presents with a chief complaint of Unstable angina, abn EKG (19 May 2024 14:30)    HPI:  Followed for ESRD on HD.  Last treated on Friday.      HEALTH ISSUES - PROBLEM Dx:        MEDICATIONS  (STANDING):  albuterol/ipratropium for Nebulization 3 milliLiter(s) Nebulizer every 12 hours  aspirin  chewable 81 milliGRAM(s) Oral daily  atorvastatin 20 milliGRAM(s) Oral at bedtime  carvedilol 12.5 milliGRAM(s) Oral every 12 hours  chlorhexidine 0.12% Liquid 15 milliLiter(s) Oral Mucosa every 12 hours  chlorhexidine 2% Cloths 1 Application(s) Topical <User Schedule>  dextrose 10% Bolus 125 milliLiter(s) IV Bolus once  dextrose 5%. 1000 milliLiter(s) (50 mL/Hr) IV Continuous <Continuous>  dextrose 5%. 1000 milliLiter(s) (100 mL/Hr) IV Continuous <Continuous>  dextrose 50% Injectable 12.5 Gram(s) IV Push once  dextrose 50% Injectable 25 Gram(s) IV Push once  epoetin reilly (EPOGEN) Injectable 18035 Unit(s) IV Push <User Schedule>  ferrous    sulfate Liquid 300 milliGRAM(s) Enteral Tube daily  glucagon  Injectable 1 milliGRAM(s) IntraMuscular once  heparin  Infusion. 1100 Unit(s)/Hr (11 mL/Hr) IV Continuous <Continuous>  hydrALAZINE 100 milliGRAM(s) Oral three times a day  insulin lispro (ADMELOG) corrective regimen sliding scale   SubCutaneous every 6 hours  insulin NPH human recombinant 4 Unit(s) SubCutaneous every 6 hours  piperacillin/tazobactam IVPB.. 3.375 Gram(s) IV Intermittent every 12 hours  sevelamer carbonate Powder 1600 milliGRAM(s) Oral every 8 hours  sodium bicarbonate 650 milliGRAM(s) Oral every 8 hours  sodium chloride 3%  Inhalation 4 milliLiter(s) Inhalation every 12 hours    MEDICATIONS  (PRN):  acetaminophen     Tablet .. 650 milliGRAM(s) Oral every 6 hours PRN Temp greater or equal to 38C (100.4F), Mild Pain (1 - 3)  dextrose Oral Gel 15 Gram(s) Oral once PRN Blood Glucose LESS THAN 70 milliGRAM(s)/deciliter  melatonin 3 milliGRAM(s) Oral at bedtime PRN Insomnia  sodium chloride 0.9% lock flush 10 milliLiter(s) IV Push every 1 hour PRN Pre/post blood products, medications, blood draw, and to maintain line patency    Vital Signs Last 24 Hrs  T(C): 36.4 (19 May 2024 20:00), Max: 37.5 (19 May 2024 04:00)  T(F): 97.5 (19 May 2024 20:00), Max: 99.5 (19 May 2024 04:00)  HR: 53 (19 May 2024 20:27) (52 - 70)  BP: 131/52 (19 May 2024 20:00) (131/52 - 159/52)  BP(mean): 72 (19 May 2024 20:00) (72 - 81)  RR: 12 (19 May 2024 20:00) (12 - 20)  SpO2: 99% (19 May 2024 20:27) (98% - 100%)    Parameters below as of 19 May 2024 20:27  Patient On (Oxygen Delivery Method): ventilator      Daily     Daily Weight in k.3 (19 May 2024 02:33)    PHYSICAL EXAM:  Constitutional:  He appears comfortable and not distressed. Not diaphoretic.    Neck:  The thyroid is normal. Trachea is midline.     Breasts: Normal examination.    Respiratory: The lungs are clear to auscultation. No dullness and expansion is normal.    Cardiovascular: S1 and S2 are normal. No mummurs, rubs or gallops are present.    Gastrointestinal: The abdomen is soft. No tenderness is present. No masses are present. Bowel sounds are normal.    Genitourinary: The bladder is not distended. No CVA tenderness is present.    Extremities: No edema is noted. No deformities are present.    Neurological: Cognition is normal. Tone, power and sensation are normal. Gait is steady.    Skin: No leasions are seen  or palpated.    Lymph Nodes: No lymphadenopathy is present.    Psychiatric: Mood is appropriate. No hallucinations or flight of ideas are noted.                              8.1    10.08 )-----------( 334      ( 19 May 2024 05:50 )             23.9     05-19    133<L>  |  92<L>  |  51<H>  ----------------------------<  193<H>  3.8   |  25  |  5.08<H>    Ca    7.9<L>      19 May 2024 05:50  Phos  2.7       Mg     2.50

## 2024-05-19 NOTE — PHYSICAL THERAPY INITIAL EVALUATION ADULT - PERTINENT HX OF CURRENT PROBLEM, REHAB EVAL
Patient is 73 year old male, history of ESRD on HD MWF, HTN, and 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 Baclofen started and course complicated by AMS second to baclofen toxicity requiring intubation and MICU transfer. Course further complicated by LEFT talya and cerebellum stroke, R AVF stenosis, aspiration and B Cereus bacteremia and RLE DVT. s/p trach and transferred to RCU 5/16
73 year old male presents with chest pain and hiccups for 2 days, noted to have peaked T waves. Post two rapid response teams for altered mental status, transferred to MICU for encephalopathy requiring  airway protection on 5/2, intubated for airway protection. MRI Brain with left pontine and left cerebellar acute infarcts, no hemorrhage. Now extubated 5/9 and placed on high flow nasal cannula.

## 2024-05-19 NOTE — PROGRESS NOTE ADULT - ASSESSMENT
73-year-old male past medical history hypertension, ESRD on dialysis Monday Wednesday Friday, diabetes insulin-dependent presents with hiccups patient endorses persistent hiccups for the last 2 days. Nephrology consulted for HD needs.    A/P  ESRD:  Center: [place]  Nephrologist: Dr. Hardwick  Access: R AVF  MWF schedule  Vascular for fistulogram   s/p femoral shiley on 5/1, now removed  s/p placement of IJ shiley 5/3  Stopped CRRT   Restarted IHD  s/p HD 5/7 UF 1778; treatment terminated early due to hypotension   S/P MRA head/neck w/ gadolinium --> Received HD on 5/9 and 5/10.  5/10 Will need to dialyze 3 days consecutively--> plan for HD on 5/11 5/11 shiley removed due to bacteremia; did not receive HD.  Line holiday  5/12 - BUN worsened; K up trending.    5/13 Shiley placed.  5/17: s/p PEG placement. Planned for fistulogram but rescheduled for next week.  + swelling of RUE.  HD on 5/18 w/ UF goal of 1.5L.  Consent obtained and placed in ED chart  Renal diet  Monitor BMP    Hyperkalemia  Improved w/ HD.  C/W HD schedule as above.  Lokelma 10gm PRN for K >5.3 on non-HD days.  Low K diet.  Monitor closely     HTN:  BP fluctuating.  On carvedilol 12.5mg BID, hydralazine 100mg TID, nifedipine 60mg qd.  UF w/ HD.  Monitor BP.    Anemia:  At goal.  + iron deficiency.  Tsat 13% - on ferrous sulfate 300mg qd via PEG.  Will hold venofer for now in view of up trending leukocytosis.  SILVA w/ HD.  Tranfuse for Hgb <7.  Monitor Hb.    CKD-MBD  Check PTH  PO4 worsened.  Increase Sevelamer 800mg to 1600mg TID.  Low PO4 diet.  Monitor Ca, PO4 daily    Hypocalcemia:  In setting of CKD vs. hypoalbuminemia.  Optimize albumin.  Corrected Ca WNL.  Improving.  Monitor Ca.    D/W family and ACP.

## 2024-05-19 NOTE — PHYSICAL THERAPY INITIAL EVALUATION ADULT - PATIENT PROFILE REVIEW, REHAB EVAL
Activity - Increase as tolerated. Pt cleared for PT evaluation prior to session by JG Somers./yes
yes

## 2024-05-19 NOTE — PROGRESS NOTE ADULT - ASSESSMENT
73M w/ PMHx hypertension, ESRD on HD via R radiocephalic fistula (MWF), DM, HTN, p/w hiccups and peaked T waves, admitted to MICU for c/f baclofen toxicity. Patient received 1x HD on admission. R femoral shiley removed 5/2, had 2 RRT for AMS and failed HD via AVF 5/3. S/p emergent R IJ shiley placement 5/3, started CRRT which is now transitioned back to iHD. Vascular planning RUE fistulogram when medically optimized. Extubated to HFNC then reintubated 5/12 for c/f aspiration w/ hypoxic resp failure. S/p trach 5/14. Downgraded from MICU to RCU 5/16.    Recommendations:   - On hep gtt for nonocclusive R common iliac DVT  - Fistulogram planning pending OR availability  - Consent signed by wife, in chart  - Med/cards/ID optimization, risk stratification documented  - Continue HD via replaced R IJ shiley  - Global care per primary     Vascular Surgery  [id number]

## 2024-05-20 LAB
ANION GAP SERPL CALC-SCNC: 15 MMOL/L — HIGH (ref 7–14)
APTT BLD: 129.2 SEC — CRITICAL HIGH (ref 24.5–35.6)
APTT BLD: 70.5 SEC — HIGH (ref 24.5–35.6)
APTT BLD: 83.1 SEC — HIGH (ref 24.5–35.6)
BASE EXCESS BLDV CALC-SCNC: 4.4 MMOL/L — HIGH (ref -2–3)
BUN SERPL-MCNC: 68 MG/DL — HIGH (ref 7–23)
CA-I SERPL-SCNC: 1.1 MMOL/L — LOW (ref 1.15–1.33)
CALCIUM SERPL-MCNC: 7.7 MG/DL — LOW (ref 8.4–10.5)
CHLORIDE BLDV-SCNC: 90 MMOL/L — LOW (ref 96–108)
CHLORIDE SERPL-SCNC: 88 MMOL/L — LOW (ref 98–107)
CO2 BLDV-SCNC: 30.6 MMOL/L — HIGH (ref 22–26)
CO2 SERPL-SCNC: 27 MMOL/L — SIGNIFICANT CHANGE UP (ref 22–31)
CREAT SERPL-MCNC: 6.25 MG/DL — HIGH (ref 0.5–1.3)
CULTURE RESULTS: SIGNIFICANT CHANGE UP
EGFR: 9 ML/MIN/1.73M2 — LOW
GAS PNL BLDV: 125 MMOL/L — LOW (ref 136–145)
GAS PNL BLDV: SIGNIFICANT CHANGE UP
GLUCOSE BLDC GLUCOMTR-MCNC: 181 MG/DL — HIGH (ref 70–99)
GLUCOSE BLDC GLUCOMTR-MCNC: 208 MG/DL — HIGH (ref 70–99)
GLUCOSE BLDC GLUCOMTR-MCNC: 226 MG/DL — HIGH (ref 70–99)
GLUCOSE BLDC GLUCOMTR-MCNC: 228 MG/DL — HIGH (ref 70–99)
GLUCOSE BLDC GLUCOMTR-MCNC: 235 MG/DL — HIGH (ref 70–99)
GLUCOSE BLDV-MCNC: 145 MG/DL — HIGH (ref 70–99)
GLUCOSE SERPL-MCNC: 207 MG/DL — HIGH (ref 70–99)
HCO3 BLDV-SCNC: 29 MMOL/L — SIGNIFICANT CHANGE UP (ref 22–29)
HCT VFR BLD CALC: 21.6 % — LOW (ref 39–50)
HCT VFR BLDA CALC: 24 % — LOW (ref 39–51)
HGB BLD CALC-MCNC: 7.9 G/DL — LOW (ref 12.6–17.4)
HGB BLD-MCNC: 7.4 G/DL — LOW (ref 13–17)
LACTATE BLDV-MCNC: 1.3 MMOL/L — SIGNIFICANT CHANGE UP (ref 0.5–2)
MAGNESIUM SERPL-MCNC: 2.8 MG/DL — HIGH (ref 1.6–2.6)
MCHC RBC-ENTMCNC: 21.2 PG — LOW (ref 27–34)
MCHC RBC-ENTMCNC: 34.3 GM/DL — SIGNIFICANT CHANGE UP (ref 32–36)
MCV RBC AUTO: 61.9 FL — LOW (ref 80–100)
NRBC # BLD: 0 /100 WBCS — SIGNIFICANT CHANGE UP (ref 0–0)
NRBC # FLD: 0.02 K/UL — HIGH (ref 0–0)
PCO2 BLDV: 44 MMHG — SIGNIFICANT CHANGE UP (ref 42–55)
PH BLDV: 7.43 — SIGNIFICANT CHANGE UP (ref 7.32–7.43)
PHOSPHATE SERPL-MCNC: 4 MG/DL — SIGNIFICANT CHANGE UP (ref 2.5–4.5)
PLATELET # BLD AUTO: 349 K/UL — SIGNIFICANT CHANGE UP (ref 150–400)
PO2 BLDV: 153 MMHG — HIGH (ref 25–45)
POTASSIUM BLDV-SCNC: 4.4 MMOL/L — SIGNIFICANT CHANGE UP (ref 3.5–5.1)
POTASSIUM SERPL-MCNC: 3.9 MMOL/L — SIGNIFICANT CHANGE UP (ref 3.5–5.3)
POTASSIUM SERPL-SCNC: 3.9 MMOL/L — SIGNIFICANT CHANGE UP (ref 3.5–5.3)
RBC # BLD: 3.49 M/UL — LOW (ref 4.2–5.8)
RBC # FLD: 19.8 % — HIGH (ref 10.3–14.5)
SAO2 % BLDV: 98.4 % — HIGH (ref 67–88)
SODIUM SERPL-SCNC: 130 MMOL/L — LOW (ref 135–145)
SPECIMEN SOURCE: SIGNIFICANT CHANGE UP
VANCOMYCIN FLD-MCNC: 17.1 UG/ML — SIGNIFICANT CHANGE UP
WBC # BLD: 9.06 K/UL — SIGNIFICANT CHANGE UP (ref 3.8–10.5)
WBC # FLD AUTO: 9.06 K/UL — SIGNIFICANT CHANGE UP (ref 3.8–10.5)

## 2024-05-20 PROCEDURE — 99233 SBSQ HOSP IP/OBS HIGH 50: CPT

## 2024-05-20 RX ORDER — HYDRALAZINE HCL 50 MG
100 TABLET ORAL ONCE
Refills: 0 | Status: COMPLETED | OUTPATIENT
Start: 2024-05-20 | End: 2024-05-20

## 2024-05-20 RX ORDER — VANCOMYCIN HCL 1 G
500 VIAL (EA) INTRAVENOUS ONCE
Refills: 0 | Status: COMPLETED | OUTPATIENT
Start: 2024-05-20 | End: 2024-05-21

## 2024-05-20 RX ADMIN — Medication 650 MILLIGRAM(S): at 14:10

## 2024-05-20 RX ADMIN — Medication 100 MILLIGRAM(S): at 08:15

## 2024-05-20 RX ADMIN — SODIUM CHLORIDE 4 MILLILITER(S): 9 INJECTION INTRAMUSCULAR; INTRAVENOUS; SUBCUTANEOUS at 19:30

## 2024-05-20 RX ADMIN — SEVELAMER CARBONATE 1600 MILLIGRAM(S): 2400 POWDER, FOR SUSPENSION ORAL at 14:12

## 2024-05-20 RX ADMIN — SODIUM CHLORIDE 4 MILLILITER(S): 9 INJECTION INTRAMUSCULAR; INTRAVENOUS; SUBCUTANEOUS at 07:50

## 2024-05-20 RX ADMIN — Medication 650 MILLIGRAM(S): at 22:24

## 2024-05-20 RX ADMIN — Medication 4: at 00:37

## 2024-05-20 RX ADMIN — Medication 81 MILLIGRAM(S): at 11:19

## 2024-05-20 RX ADMIN — Medication 300 MILLIGRAM(S): at 11:19

## 2024-05-20 RX ADMIN — ATORVASTATIN CALCIUM 20 MILLIGRAM(S): 80 TABLET, FILM COATED ORAL at 22:25

## 2024-05-20 RX ADMIN — CHLORHEXIDINE GLUCONATE 15 MILLILITER(S): 213 SOLUTION TOPICAL at 05:28

## 2024-05-20 RX ADMIN — CHLORHEXIDINE GLUCONATE 15 MILLILITER(S): 213 SOLUTION TOPICAL at 17:30

## 2024-05-20 RX ADMIN — Medication 650 MILLIGRAM(S): at 05:27

## 2024-05-20 RX ADMIN — PIPERACILLIN AND TAZOBACTAM 25 GRAM(S): 4; .5 INJECTION, POWDER, LYOPHILIZED, FOR SOLUTION INTRAVENOUS at 05:29

## 2024-05-20 RX ADMIN — Medication 650 MILLIGRAM(S): at 13:41

## 2024-05-20 RX ADMIN — HEPARIN SODIUM 1100 UNIT(S)/HR: 5000 INJECTION INTRAVENOUS; SUBCUTANEOUS at 08:16

## 2024-05-20 RX ADMIN — ERYTHROPOIETIN 10000 UNIT(S): 10000 INJECTION, SOLUTION INTRAVENOUS; SUBCUTANEOUS at 19:48

## 2024-05-20 RX ADMIN — Medication 3 MILLILITER(S): at 20:29

## 2024-05-20 RX ADMIN — Medication 3 MILLILITER(S): at 07:49

## 2024-05-20 RX ADMIN — HUMAN INSULIN 4 UNIT(S): 100 INJECTION, SUSPENSION SUBCUTANEOUS at 00:38

## 2024-05-20 RX ADMIN — SEVELAMER CARBONATE 1600 MILLIGRAM(S): 2400 POWDER, FOR SUSPENSION ORAL at 05:28

## 2024-05-20 RX ADMIN — Medication 4: at 11:20

## 2024-05-20 RX ADMIN — Medication 4: at 17:30

## 2024-05-20 RX ADMIN — SEVELAMER CARBONATE 1600 MILLIGRAM(S): 2400 POWDER, FOR SUSPENSION ORAL at 22:24

## 2024-05-20 RX ADMIN — Medication 100 MILLIGRAM(S): at 05:28

## 2024-05-20 RX ADMIN — HEPARIN SODIUM 0 UNIT(S)/HR: 5000 INJECTION INTRAVENOUS; SUBCUTANEOUS at 07:09

## 2024-05-20 RX ADMIN — HUMAN INSULIN 4 UNIT(S): 100 INJECTION, SUSPENSION SUBCUTANEOUS at 11:20

## 2024-05-20 RX ADMIN — Medication 650 MILLIGRAM(S): at 14:12

## 2024-05-20 RX ADMIN — HUMAN INSULIN 4 UNIT(S): 100 INJECTION, SUSPENSION SUBCUTANEOUS at 06:17

## 2024-05-20 RX ADMIN — PIPERACILLIN AND TAZOBACTAM 25 GRAM(S): 4; .5 INJECTION, POWDER, LYOPHILIZED, FOR SOLUTION INTRAVENOUS at 22:23

## 2024-05-20 RX ADMIN — HEPARIN SODIUM 1100 UNIT(S)/HR: 5000 INJECTION INTRAVENOUS; SUBCUTANEOUS at 15:06

## 2024-05-20 RX ADMIN — CARVEDILOL PHOSPHATE 12.5 MILLIGRAM(S): 80 CAPSULE, EXTENDED RELEASE ORAL at 10:37

## 2024-05-20 RX ADMIN — Medication 4: at 06:17

## 2024-05-20 RX ADMIN — Medication 100 MILLIGRAM(S): at 22:22

## 2024-05-20 RX ADMIN — HUMAN INSULIN 4 UNIT(S): 100 INJECTION, SUSPENSION SUBCUTANEOUS at 17:30

## 2024-05-20 RX ADMIN — CHLORHEXIDINE GLUCONATE 1 APPLICATION(S): 213 SOLUTION TOPICAL at 05:28

## 2024-05-20 RX ADMIN — HEPARIN SODIUM 1300 UNIT(S)/HR: 5000 INJECTION INTRAVENOUS; SUBCUTANEOUS at 06:58

## 2024-05-20 RX ADMIN — CARVEDILOL PHOSPHATE 12.5 MILLIGRAM(S): 80 CAPSULE, EXTENDED RELEASE ORAL at 22:23

## 2024-05-20 NOTE — PROGRESS NOTE ADULT - SUBJECTIVE AND OBJECTIVE BOX
JIMMY [id number]  73yMale  T(C): 36.8 (05-20-24 @ 04:00), Max: 37.2 (05-19-24 @ 16:00)  HR: 57 (05-20-24 @ 07:50) (52 - 68)  BP: 151/51 (05-20-24 @ 04:00) (126/49 - 159/52)  RR: 12 (05-20-24 @ 04:00) (12 - 20)  SpO2: 97% (05-20-24 @ 07:50) (97% - 100%)  Wt(kg): --  05-19 @ 07:01  -  05-20 @ 07:00  --------------------------------------------------------  IN: 696 mL / OUT: 0 mL / NET: 696 mL      normal cephalic atraumatic  s1s2   clear to ascultation bilaterally  soft, non tender, non distended no guarding or rebound  no clubbing cyanosis or edema

## 2024-05-20 NOTE — PROGRESS NOTE ADULT - SUBJECTIVE AND OBJECTIVE BOX
Vascular Surgery Progress Note     Subjective:  Patient seen and examined on AM rounds. Patient awake and alert, no complaints.      Vital Signs:  Vital Signs Last 24 Hrs  T(C): 36.8 (20 May 2024 04:00), Max: 37.2 (19 May 2024 16:00)  T(F): 98.3 (20 May 2024 04:00), Max: 98.9 (19 May 2024 16:00)  HR: 57 (20 May 2024 07:50) (52 - 68)  BP: 151/51 (20 May 2024 04:00) (126/49 - 159/52)  BP(mean): 77 (20 May 2024 04:00) (70 - 79)  RR: 12 (20 May 2024 04:00) (12 - 20)  SpO2: 97% (20 May 2024 07:50) (97% - 100%)    Parameters below as of 20 May 2024 04:00  Patient On (Oxygen Delivery Method): ventilator    O2 Concentration (%): 30    CAPILLARY BLOOD GLUCOSE      POCT Blood Glucose.: 235 mg/dL (20 May 2024 06:14)  POCT Blood Glucose.: 228 mg/dL (20 May 2024 00:33)  POCT Blood Glucose.: 140 mg/dL (19 May 2024 17:13)  POCT Blood Glucose.: 251 mg/dL (19 May 2024 11:21)      I&O's Detail    19 May 2024 07:01  -  20 May 2024 07:00  --------------------------------------------------------  IN:    Heparin Infusion: 156 mL    Nepro: 540 mL  Total IN: 696 mL    OUT:    Voided (mL): 0 mL  Total OUT: 0 mL    Total NET: 696 mL            Physical Exam:  General: awake and alert  HEENT: Normocephalic atraumatic  Respiratory: s/p trach, on vent  Cardio: regular rate  Vascular: extremities are warm and well perfused, palpable b/l radial pulses, R AVF w/ palpable thrill, R IJ shiley in place      Labs:    05-20    130<L>  |  88<L>  |  68<H>  ----------------------------<  207<H>  3.9   |  27  |  6.25<H>    Ca    7.7<L>      20 May 2024 06:31  Phos  4.0     05-20  Mg     2.80     05-20                              7.4    9.06  )-----------( 349      ( 20 May 2024 06:31 )             21.6     PTT - ( 20 May 2024 06:31 )  PTT:129.2 sec

## 2024-05-20 NOTE — PROGRESS NOTE ADULT - SUBJECTIVE AND OBJECTIVE BOX
CHIEF COMPLAINT: Patient is a 73y old  Male who presents with a chief complaint of Unstable angina, abn EKG (20 May 2024 08:44)      INTERVAL EVENTS:     ROS: Seen by bedside during AM rounds     OBJECTIVE:  ICU Vital Signs Last 24 Hrs  T(C): 36.8 (20 May 2024 04:00), Max: 37.2 (19 May 2024 16:00)  T(F): 98.3 (20 May 2024 04:00), Max: 98.9 (19 May 2024 16:00)  HR: 59 (20 May 2024 08:00) (52 - 65)  BP: 178/51 (20 May 2024 08:00) (126/49 - 178/51)  BP(mean): 77 (20 May 2024 04:00) (70 - 79)  ABP: --  ABP(mean): --  RR: 12 (20 May 2024 04:00) (12 - 20)  SpO2: 97% (20 May 2024 07:50) (97% - 100%)    O2 Parameters below as of 20 May 2024 07:50  Patient On (Oxygen Delivery Method): ventilator          Mode: CPAP with PS, FiO2: 30, PEEP: 5, MAP: 8, PIP: 16    05-19 @ 07:01  -  05-20 @ 07:00  --------------------------------------------------------  IN: 696 mL / OUT: 0 mL / NET: 696 mL      CAPILLARY BLOOD GLUCOSE      POCT Blood Glucose.: 235 mg/dL (20 May 2024 06:14)      PHYSICAL EXAM:  General:   HEENT:   Lymph Nodes:  Neck:   Respiratory:   Cardiovascular:   Abdomen:   Extremities:   Skin:   Neurological:  Psychiatry:    Mode: CPAP with PS  FiO2: 30  PEEP: 5  MAP: 8  PIP: 16      HOSPITAL MEDICATIONS:  MEDICATIONS  (STANDING):  albuterol/ipratropium for Nebulization 3 milliLiter(s) Nebulizer every 12 hours  aspirin  chewable 81 milliGRAM(s) Oral daily  atorvastatin 20 milliGRAM(s) Oral at bedtime  carvedilol 12.5 milliGRAM(s) Oral every 12 hours  chlorhexidine 0.12% Liquid 15 milliLiter(s) Oral Mucosa every 12 hours  chlorhexidine 2% Cloths 1 Application(s) Topical <User Schedule>  dextrose 10% Bolus 125 milliLiter(s) IV Bolus once  dextrose 5%. 1000 milliLiter(s) (50 mL/Hr) IV Continuous <Continuous>  dextrose 5%. 1000 milliLiter(s) (100 mL/Hr) IV Continuous <Continuous>  dextrose 50% Injectable 12.5 Gram(s) IV Push once  dextrose 50% Injectable 25 Gram(s) IV Push once  epoetin reilly (EPOGEN) Injectable 46177 Unit(s) IV Push <User Schedule>  ferrous    sulfate Liquid 300 milliGRAM(s) Enteral Tube daily  glucagon  Injectable 1 milliGRAM(s) IntraMuscular once  heparin  Infusion. 1100 Unit(s)/Hr (11 mL/Hr) IV Continuous <Continuous>  hydrALAZINE 100 milliGRAM(s) Oral three times a day  insulin lispro (ADMELOG) corrective regimen sliding scale   SubCutaneous every 6 hours  insulin NPH human recombinant 4 Unit(s) SubCutaneous every 6 hours  piperacillin/tazobactam IVPB.. 3.375 Gram(s) IV Intermittent every 12 hours  sevelamer carbonate Powder 1600 milliGRAM(s) Oral every 8 hours  sodium bicarbonate 650 milliGRAM(s) Oral every 8 hours  sodium chloride 3%  Inhalation 4 milliLiter(s) Inhalation every 12 hours    MEDICATIONS  (PRN):  acetaminophen     Tablet .. 650 milliGRAM(s) Oral every 6 hours PRN Temp greater or equal to 38C (100.4F), Mild Pain (1 - 3)  dextrose Oral Gel 15 Gram(s) Oral once PRN Blood Glucose LESS THAN 70 milliGRAM(s)/deciliter  melatonin 3 milliGRAM(s) Oral at bedtime PRN Insomnia  sodium chloride 0.9% lock flush 10 milliLiter(s) IV Push every 1 hour PRN Pre/post blood products, medications, blood draw, and to maintain line patency      LABS:                        7.4    9.06  )-----------( 349      ( 20 May 2024 06:31 )             21.6     05-20    130<L>  |  88<L>  |  68<H>  ----------------------------<  207<H>  3.9   |  27  |  6.25<H>    Ca    7.7<L>      20 May 2024 06:31  Phos  4.0     05-20  Mg     2.80     05-20      PTT - ( 20 May 2024 06:31 )  PTT:129.2 sec  Urinalysis Basic - ( 20 May 2024 06:31 )    Color: x / Appearance: x / SG: x / pH: x  Gluc: 207 mg/dL / Ketone: x  / Bili: x / Urobili: x   Blood: x / Protein: x / Nitrite: x   Leuk Esterase: x / RBC: x / WBC x   Sq Epi: x / Non Sq Epi: x / Bacteria: x         CHIEF COMPLAINT: Patient is a 73y old  Male who presents with a chief complaint of Unstable angina, abn EKG (20 May 2024 08:44)      INTERVAL EVENTS:   - No acute overnight events.     ROS: Seen by bedside during AM rounds. Unable to obtain 2/2 mental status. Wife at bedside, states patient has been improving and more alert today.     OBJECTIVE:  ICU Vital Signs Last 24 Hrs  T(C): 36.8 (20 May 2024 04:00), Max: 37.2 (19 May 2024 16:00)  T(F): 98.3 (20 May 2024 04:00), Max: 98.9 (19 May 2024 16:00)  HR: 59 (20 May 2024 08:00) (52 - 65)  BP: 178/51 (20 May 2024 08:00) (126/49 - 178/51)  BP(mean): 77 (20 May 2024 04:00) (70 - 79)  ABP: --  ABP(mean): --  RR: 12 (20 May 2024 04:00) (12 - 20)  SpO2: 97% (20 May 2024 07:50) (97% - 100%)    O2 Parameters below as of 20 May 2024 07:50  Patient On (Oxygen Delivery Method): ventilator          Mode: CPAP with PS, FiO2: 30, PEEP: 5, MAP: 8, PIP: 16    05-19 @ 07:01  -  05-20 @ 07:00  --------------------------------------------------------  IN: 696 mL / OUT: 0 mL / NET: 696 mL      CAPILLARY BLOOD GLUCOSE      POCT Blood Glucose.: 235 mg/dL (20 May 2024 06:14)      PHYSICAL EXAM:  General: NAD  Neck: neck supple, no JVD, +Trach collar, site is c/d/i, +RIJ shiley   Respiratory: lungs cta b/l, no wheeze or rhonchi, no rales, no resp distress  Cardiovascular: normal s1, s2, reg rate and rhythm  Abdomen: soft, non tender, +PEG   Extremities: no LE edema b/l  Skin: warm and dry  Neurological: AAO x 4, awake and alert, follows commands, no gross focal deficits  Psychiatry: normal affect, normal behavior      Mode: CPAP with PS  FiO2: 30  PEEP: 5  MAP: 8  PIP: 16      HOSPITAL MEDICATIONS:  MEDICATIONS  (STANDING):  albuterol/ipratropium for Nebulization 3 milliLiter(s) Nebulizer every 12 hours  aspirin  chewable 81 milliGRAM(s) Oral daily  atorvastatin 20 milliGRAM(s) Oral at bedtime  carvedilol 12.5 milliGRAM(s) Oral every 12 hours  chlorhexidine 0.12% Liquid 15 milliLiter(s) Oral Mucosa every 12 hours  chlorhexidine 2% Cloths 1 Application(s) Topical <User Schedule>  dextrose 10% Bolus 125 milliLiter(s) IV Bolus once  dextrose 5%. 1000 milliLiter(s) (50 mL/Hr) IV Continuous <Continuous>  dextrose 5%. 1000 milliLiter(s) (100 mL/Hr) IV Continuous <Continuous>  dextrose 50% Injectable 12.5 Gram(s) IV Push once  dextrose 50% Injectable 25 Gram(s) IV Push once  epoetin reilly (EPOGEN) Injectable 39608 Unit(s) IV Push <User Schedule>  ferrous    sulfate Liquid 300 milliGRAM(s) Enteral Tube daily  glucagon  Injectable 1 milliGRAM(s) IntraMuscular once  heparin  Infusion. 1100 Unit(s)/Hr (11 mL/Hr) IV Continuous <Continuous>  hydrALAZINE 100 milliGRAM(s) Oral three times a day  insulin lispro (ADMELOG) corrective regimen sliding scale   SubCutaneous every 6 hours  insulin NPH human recombinant 4 Unit(s) SubCutaneous every 6 hours  piperacillin/tazobactam IVPB.. 3.375 Gram(s) IV Intermittent every 12 hours  sevelamer carbonate Powder 1600 milliGRAM(s) Oral every 8 hours  sodium bicarbonate 650 milliGRAM(s) Oral every 8 hours  sodium chloride 3%  Inhalation 4 milliLiter(s) Inhalation every 12 hours    MEDICATIONS  (PRN):  acetaminophen     Tablet .. 650 milliGRAM(s) Oral every 6 hours PRN Temp greater or equal to 38C (100.4F), Mild Pain (1 - 3)  dextrose Oral Gel 15 Gram(s) Oral once PRN Blood Glucose LESS THAN 70 milliGRAM(s)/deciliter  melatonin 3 milliGRAM(s) Oral at bedtime PRN Insomnia  sodium chloride 0.9% lock flush 10 milliLiter(s) IV Push every 1 hour PRN Pre/post blood products, medications, blood draw, and to maintain line patency      LABS:                        7.4    9.06  )-----------( 349      ( 20 May 2024 06:31 )             21.6     05-20    130<L>  |  88<L>  |  68<H>  ----------------------------<  207<H>  3.9   |  27  |  6.25<H>    Ca    7.7<L>      20 May 2024 06:31  Phos  4.0     05-20  Mg     2.80     05-20      PTT - ( 20 May 2024 06:31 )  PTT:129.2 sec  Urinalysis Basic - ( 20 May 2024 06:31 )    Color: x / Appearance: x / SG: x / pH: x  Gluc: 207 mg/dL / Ketone: x  / Bili: x / Urobili: x   Blood: x / Protein: x / Nitrite: x   Leuk Esterase: x / RBC: x / WBC x   Sq Epi: x / Non Sq Epi: x / Bacteria: x         CHIEF COMPLAINT: Patient is a 73y old  Male who presents with a chief complaint of Unstable angina, abn EKG (20 May 2024 08:44)      INTERVAL EVENTS:   - No acute overnight events.     ROS: Seen by bedside during AM rounds. Unable to obtain 2/2 mental status. Wife at bedside, states patient has been improving and more alert today.     OBJECTIVE:  ICU Vital Signs Last 24 Hrs  T(C): 36.8 (20 May 2024 04:00), Max: 37.2 (19 May 2024 16:00)  T(F): 98.3 (20 May 2024 04:00), Max: 98.9 (19 May 2024 16:00)  HR: 59 (20 May 2024 08:00) (52 - 65)  BP: 178/51 (20 May 2024 08:00) (126/49 - 178/51)  BP(mean): 77 (20 May 2024 04:00) (70 - 79)  ABP: --  ABP(mean): --  RR: 12 (20 May 2024 04:00) (12 - 20)  SpO2: 97% (20 May 2024 07:50) (97% - 100%)    O2 Parameters below as of 20 May 2024 07:50  Patient On (Oxygen Delivery Method): ventilator          Mode: CPAP with PS, FiO2: 30, PEEP: 5, MAP: 8, PIP: 16    05-19 @ 07:01  -  05-20 @ 07:00  --------------------------------------------------------  IN: 696 mL / OUT: 0 mL / NET: 696 mL      CAPILLARY BLOOD GLUCOSE      POCT Blood Glucose.: 235 mg/dL (20 May 2024 06:14)      PHYSICAL EXAM:  General: NAD  Neck: neck supple, no JVD, +Trach collar, site is c/d/i, +RIJ shiley   Respiratory: lungs cta b/l, no wheeze or rhonchi, no rales, no resp distress  Cardiovascular: normal s1, s2, reg rate and rhythm  Abdomen: soft, non tender, +PEG   Extremities: no LE edema b/l  Skin: warm and dry  Neurological: Awake, eyes open spontaneously follows simple commands, raises UE b/l, squeezes fingers   Psychiatry: no agitation      Mode: CPAP with PS  FiO2: 30  PEEP: 5  MAP: 8  PIP: 16      HOSPITAL MEDICATIONS:  MEDICATIONS  (STANDING):  albuterol/ipratropium for Nebulization 3 milliLiter(s) Nebulizer every 12 hours  aspirin  chewable 81 milliGRAM(s) Oral daily  atorvastatin 20 milliGRAM(s) Oral at bedtime  carvedilol 12.5 milliGRAM(s) Oral every 12 hours  chlorhexidine 0.12% Liquid 15 milliLiter(s) Oral Mucosa every 12 hours  chlorhexidine 2% Cloths 1 Application(s) Topical <User Schedule>  dextrose 10% Bolus 125 milliLiter(s) IV Bolus once  dextrose 5%. 1000 milliLiter(s) (50 mL/Hr) IV Continuous <Continuous>  dextrose 5%. 1000 milliLiter(s) (100 mL/Hr) IV Continuous <Continuous>  dextrose 50% Injectable 12.5 Gram(s) IV Push once  dextrose 50% Injectable 25 Gram(s) IV Push once  epoetin reilly (EPOGEN) Injectable 39963 Unit(s) IV Push <User Schedule>  ferrous    sulfate Liquid 300 milliGRAM(s) Enteral Tube daily  glucagon  Injectable 1 milliGRAM(s) IntraMuscular once  heparin  Infusion. 1100 Unit(s)/Hr (11 mL/Hr) IV Continuous <Continuous>  hydrALAZINE 100 milliGRAM(s) Oral three times a day  insulin lispro (ADMELOG) corrective regimen sliding scale   SubCutaneous every 6 hours  insulin NPH human recombinant 4 Unit(s) SubCutaneous every 6 hours  piperacillin/tazobactam IVPB.. 3.375 Gram(s) IV Intermittent every 12 hours  sevelamer carbonate Powder 1600 milliGRAM(s) Oral every 8 hours  sodium bicarbonate 650 milliGRAM(s) Oral every 8 hours  sodium chloride 3%  Inhalation 4 milliLiter(s) Inhalation every 12 hours    MEDICATIONS  (PRN):  acetaminophen     Tablet .. 650 milliGRAM(s) Oral every 6 hours PRN Temp greater or equal to 38C (100.4F), Mild Pain (1 - 3)  dextrose Oral Gel 15 Gram(s) Oral once PRN Blood Glucose LESS THAN 70 milliGRAM(s)/deciliter  melatonin 3 milliGRAM(s) Oral at bedtime PRN Insomnia  sodium chloride 0.9% lock flush 10 milliLiter(s) IV Push every 1 hour PRN Pre/post blood products, medications, blood draw, and to maintain line patency      LABS:                        7.4    9.06  )-----------( 349      ( 20 May 2024 06:31 )             21.6     05-20    130<L>  |  88<L>  |  68<H>  ----------------------------<  207<H>  3.9   |  27  |  6.25<H>    Ca    7.7<L>      20 May 2024 06:31  Phos  4.0     05-20  Mg     2.80     05-20      PTT - ( 20 May 2024 06:31 )  PTT:129.2 sec  Urinalysis Basic - ( 20 May 2024 06:31 )    Color: x / Appearance: x / SG: x / pH: x  Gluc: 207 mg/dL / Ketone: x  / Bili: x / Urobili: x   Blood: x / Protein: x / Nitrite: x   Leuk Esterase: x / RBC: x / WBC x   Sq Epi: x / Non Sq Epi: x / Bacteria: x

## 2024-05-20 NOTE — PROGRESS NOTE ADULT - ASSESSMENT
73M w/ PMHx hypertension, ESRD on HD via R radiocephalic fistula (MWF), DM, HTN, p/w hiccups and peaked T waves, admitted to MICU for c/f baclofen toxicity. Patient received 1x HD on admission. R femoral shiley removed 5/2, had 2 RRT for AMS and failed HD via AVF 5/3. S/p emergent R IJ shiley placement 5/3, started CRRT which is now transitioned back to iHD. Vascular planning RUE fistulogram when medically optimized. Extubated to HFNC then reintubated 5/12 for c/f aspiration w/ hypoxic resp failure. S/p trach 5/14. Downgraded from MICU to RCU 5/16.    Recommendations:   - On hep gtt for nonocclusive R common iliac DVT  - Fistulogram planning pending OR availability  - Consent signed by wife, in chart  - Med/cards/ID optimization, risk stratification documented  - Continue HD via replaced R IJ shiley  - Global care per primary     Vascular Surgery  [id number]

## 2024-05-20 NOTE — PROGRESS NOTE ADULT - SUBJECTIVE AND OBJECTIVE BOX
Patient is a 73y old  Male who presents with a chief complaint of Unstable angina, abn EKG (20 May 2024 17:15)      SUBJECTIVE / OVERNIGHT EVENTS: tolerating trache, vented, more alert today, on ZOsyn, Vanco, RUE fustulogram some time this week w vascular    MEDICATIONS  (STANDING):  albuterol/ipratropium for Nebulization 3 milliLiter(s) Nebulizer every 12 hours  aspirin  chewable 81 milliGRAM(s) Oral daily  atorvastatin 20 milliGRAM(s) Oral at bedtime  carvedilol 12.5 milliGRAM(s) Oral every 12 hours  chlorhexidine 0.12% Liquid 15 milliLiter(s) Oral Mucosa every 12 hours  chlorhexidine 2% Cloths 1 Application(s) Topical <User Schedule>  dextrose 10% Bolus 125 milliLiter(s) IV Bolus once  dextrose 5%. 1000 milliLiter(s) (100 mL/Hr) IV Continuous <Continuous>  dextrose 5%. 1000 milliLiter(s) (50 mL/Hr) IV Continuous <Continuous>  dextrose 50% Injectable 12.5 Gram(s) IV Push once  dextrose 50% Injectable 25 Gram(s) IV Push once  epoetin reilly (EPOGEN) Injectable 06177 Unit(s) IV Push <User Schedule>  ferrous    sulfate Liquid 300 milliGRAM(s) Enteral Tube daily  glucagon  Injectable 1 milliGRAM(s) IntraMuscular once  heparin  Infusion. 1100 Unit(s)/Hr (11 mL/Hr) IV Continuous <Continuous>  hydrALAZINE 100 milliGRAM(s) Oral three times a day  insulin lispro (ADMELOG) corrective regimen sliding scale   SubCutaneous every 6 hours  insulin NPH human recombinant 4 Unit(s) SubCutaneous every 6 hours  piperacillin/tazobactam IVPB.. 3.375 Gram(s) IV Intermittent every 12 hours  sevelamer carbonate Powder 1600 milliGRAM(s) Oral every 8 hours  sodium bicarbonate 650 milliGRAM(s) Oral every 8 hours  sodium chloride 3%  Inhalation 4 milliLiter(s) Inhalation every 12 hours    MEDICATIONS  (PRN):  acetaminophen     Tablet .. 650 milliGRAM(s) Oral every 6 hours PRN Temp greater or equal to 38C (100.4F), Mild Pain (1 - 3)  dextrose Oral Gel 15 Gram(s) Oral once PRN Blood Glucose LESS THAN 70 milliGRAM(s)/deciliter  melatonin 3 milliGRAM(s) Oral at bedtime PRN Insomnia  sodium chloride 0.9% lock flush 10 milliLiter(s) IV Push every 1 hour PRN Pre/post blood products, medications, blood draw, and to maintain line patency      Vital Signs Last 24 Hrs  T(F): 98.5 (05-20-24 @ 21:15), Max: 98.9 (05-20-24 @ 13:00)  HR: 65 (05-20-24 @ 21:15) (52 - 66)  BP: 165/59 (05-20-24 @ 21:15) (114/68 - 178/51)  RR: 22 (05-20-24 @ 21:15) (12 - 22)  SpO2: 100% (05-20-24 @ 20:34) (97% - 100%)  Telemetry:   CAPILLARY BLOOD GLUCOSE      POCT Blood Glucose.: 208 mg/dL (20 May 2024 17:18)  POCT Blood Glucose.: 226 mg/dL (20 May 2024 11:12)  POCT Blood Glucose.: 235 mg/dL (20 May 2024 06:14)  POCT Blood Glucose.: 228 mg/dL (20 May 2024 00:33)    I&O's Summary    19 May 2024 07:01  -  20 May 2024 07:00  --------------------------------------------------------  IN: 696 mL / OUT: 0 mL / NET: 696 mL    20 May 2024 07:01  -  20 May 2024 21:50  --------------------------------------------------------  IN: 400 mL / OUT: 2000 mL / NET: -1600 mL        PHYSICAL EXAM:  GENERAL: NAD, well-developed  HEAD:  Atraumatic, Normocephalic  EYES: EOMI, PERRLA, conjunctiva and sclera clear  NECK: Supple, No JVD  CHEST/LUNG: Clear to auscultation bilaterally; No wheeze  HEART: Regular rate and rhythm; No murmurs, rubs, or gallops  ABDOMEN: Soft, Nontender, Nondistended; Bowel sounds present  EXTREMITIES:  2+ Peripheral Pulses, No clubbing, cyanosis, or edema  PSYCH: AAOx3  NEUROLOGY: non-focal  SKIN: No rashes or lesions    LABS:                        7.4    9.06  )-----------( 349      ( 20 May 2024 06:31 )             21.6     05-20    130<L>  |  88<L>  |  68<H>  ----------------------------<  207<H>  3.9   |  27  |  6.25<H>    Ca    7.7<L>      20 May 2024 06:31  Phos  4.0     05-20  Mg     2.80     05-20      PTT - ( 20 May 2024 20:20 )  PTT:70.5 sec      Urinalysis Basic - ( 20 May 2024 06:31 )    Color: x / Appearance: x / SG: x / pH: x  Gluc: 207 mg/dL / Ketone: x  / Bili: x / Urobili: x   Blood: x / Protein: x / Nitrite: x   Leuk Esterase: x / RBC: x / WBC x   Sq Epi: x / Non Sq Epi: x / Bacteria: x        RADIOLOGY & ADDITIONAL TESTS:    Imaging Personally Reviewed:    Consultant(s) Notes Reviewed:      Care Discussed with Consultants/Other Providers:

## 2024-05-20 NOTE — PROGRESS NOTE ADULT - NS ATTEND AMEND GEN_ALL_CORE FT
Patient is a 72 yo M w/ ESRD on dialysis, HTN and T2DM who was admitted with demand ischemia versus NSTEMI.  Hospital course was complicated by baclofen toxicity requiring intubation and MICU transfer, left talya and cerebellum stroke, aspiration and B cereus bacteremia and RLE DVT. Now s/p Trach 5/16    #Respiratory failure  #Trach dependence  #DVT  #ESRD  - c/w heparin gtt  - c/w mechanical ventilatory support, tolerating PSV 10/5 today  - Complete course of Vanc today  - Complete empiric course of Zosyn for recent fever  - Awaiting fistulogram per vascular  - HD as per Renal    Kody Rivas MD  Pulmonary & Critical Care

## 2024-05-20 NOTE — PROGRESS NOTE ADULT - ASSESSMENT
73M PMHx HTN, ESRD, IDDM p/w hiccups and started on baclofen with concern for AMS overnight and desaturation, ?cheye nascimento respirations. Labs with numerous metabolic derangements. CTH with bifrontal encephalomalacia, likley traumatic.   WBC inc significantly, now intubated. Exam limited as on propofol, also on pressors.   s/p LP for meningitis concerns however appears bland - not on antibiotics  EEG with GPDs, no seizures  MR brain with punctuate L pontine and L cerebellar infarcts  MRA H/N with mild basilar fenestration, otherwise neg  mental status improving post extubation but now reintubated for recurrent aspiration   s/p trach, remains sedated    AMS of unclear etiology - ? baclofen toxicity, no evidence of seizures. Metabolic encephalopathy- would not expect AMS to this degree from small strokes  L pontine and L cerebellar strokes - embolic appearing  Intractable hiccups  hypoxic resp failure  ESRD on HD  B cereus bacteremia, likely contaminant  Nonocclusive R common iliac DVT    MRI, MRA reviewed, as above  cont aspirin 81mg  TTE reviewed, no need to repeat for now  Keppra started for GPDs, no improvement in mental status - now better off keppra  continue to monitor off Keppra, avoid baclofen and reglan  s/p trach  PEG pending today  s/p trach  HD per nephro  f/u remainder of CSF - negative  s/p Zosyn, Meropenem for pna  on hep gtt for DVT  Fistulogram planned per vasc sx     73M PMHx HTN, ESRD, IDDM p/w hiccups and started on baclofen with concern for AMS overnight and desaturation, ?cheye nascimento respirations. Labs with numerous metabolic derangements. CTH with bifrontal encephalomalacia, likley traumatic.   WBC inc significantly, now intubated. Exam limited as on propofol, also on pressors.   s/p LP for meningitis concerns however appears bland - not on antibiotics  EEG with GPDs, no seizures  MR brain with punctuate L pontine and L cerebellar infarcts  MRA H/N with mild basilar fenestration, otherwise neg  mental status improving post extubation but now reintubated for recurrent aspiration   s/p trach, neurologically improving  o/e with R hemiparesis, remains anarthric but follows commands     AMS of unclear etiology - ? baclofen toxicity, no evidence of seizures. 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 - embolic appearing  Intractable hiccups  hypoxic resp failure  ESRD on HD  B cereus bacteremia, likely contaminant  Nonocclusive R common iliac DVT    MRI, MRA reviewed, as above - R hemiparesis on exam likely related to known L sided strokes - previously giving poor effort on exam   cont aspirin 81mg  TTE reviewed, no need to repeat for now  Keppra started for GPDs, no improvement in mental status - now better off keppra  continue to monitor off Keppra, avoid baclofen and reglan  s/p trach, peg   HD per nephro  f/u remainder of CSF - negative  s/p Zosyn, Meropenem for pna  Now on ZOsyn for PNA and Vanc for bactremia   on hep gtt for DVT  Fistulogram planned per vasc sx

## 2024-05-20 NOTE — PROGRESS NOTE ADULT - SUBJECTIVE AND OBJECTIVE BOX
Dr. Kraft  Office [phone] (9 am to 5 pm)  Service: [phone] (5pm to 9am)  Jennifer SIEGEL      RENAL PROGRESS NOTE: DATE OF SERVICE 05-20-24 @ 17:16    Patient is a 73y old  Male who presents with a chief complaint of Unstable angina, abn EKG (20 May 2024 13:54)      Patient seen and examined at bedside. No apparent distress    VITALS:  T(F): 98.6 (05-20-24 @ 10:35), Max: 98.6 (05-20-24 @ 10:35)  HR: 57 (05-20-24 @ 15:05)  BP: 141/47 (05-20-24 @ 10:35)  RR: 20 (05-20-24 @ 10:35)  SpO2: 99% (05-20-24 @ 15:05)  Wt(kg): --    05-19 @ 07:01  -  05-20 @ 07:00  --------------------------------------------------------  IN: 696 mL / OUT: 0 mL / NET: 696 mL          PHYSICAL EXAM:  Constitutional: NAD  Neck: No JVD, trach+  Respiratory: CTAB, no wheezes, rales or rhonchi  Cardiovascular: S1, S2, RRR  Gastrointestinal: BS+, soft, NT/ND  Extremities: No peripheral edema    Hospital Medications:   MEDICATIONS  (STANDING):  albuterol/ipratropium for Nebulization 3 milliLiter(s) Nebulizer every 12 hours  aspirin  chewable 81 milliGRAM(s) Oral daily  atorvastatin 20 milliGRAM(s) Oral at bedtime  carvedilol 12.5 milliGRAM(s) Oral every 12 hours  chlorhexidine 0.12% Liquid 15 milliLiter(s) Oral Mucosa every 12 hours  chlorhexidine 2% Cloths 1 Application(s) Topical <User Schedule>  dextrose 10% Bolus 125 milliLiter(s) IV Bolus once  dextrose 5%. 1000 milliLiter(s) (100 mL/Hr) IV Continuous <Continuous>  dextrose 5%. 1000 milliLiter(s) (50 mL/Hr) IV Continuous <Continuous>  dextrose 50% Injectable 25 Gram(s) IV Push once  dextrose 50% Injectable 12.5 Gram(s) IV Push once  epoetin reilly (EPOGEN) Injectable 39803 Unit(s) IV Push <User Schedule>  ferrous    sulfate Liquid 300 milliGRAM(s) Enteral Tube daily  glucagon  Injectable 1 milliGRAM(s) IntraMuscular once  heparin  Infusion. 1100 Unit(s)/Hr (11 mL/Hr) IV Continuous <Continuous>  hydrALAZINE 100 milliGRAM(s) Oral three times a day  insulin lispro (ADMELOG) corrective regimen sliding scale   SubCutaneous every 6 hours  insulin NPH human recombinant 4 Unit(s) SubCutaneous every 6 hours  piperacillin/tazobactam IVPB.. 3.375 Gram(s) IV Intermittent every 12 hours  sevelamer carbonate Powder 1600 milliGRAM(s) Oral every 8 hours  sodium bicarbonate 650 milliGRAM(s) Oral every 8 hours  sodium chloride 3%  Inhalation 4 milliLiter(s) Inhalation every 12 hours      LABS:  05-20    130<L>  |  88<L>  |  68<H>  ----------------------------<  207<H>  3.9   |  27  |  6.25<H>    Ca    7.7<L>      20 May 2024 06:31  Phos  4.0     05-20  Mg     2.80     05-20      Creatinine Trend: 6.25 <--, 5.08 <--, 3.64 <--, 6.17 <--, 4.55 <--, 6.91 <--, 5.83 <--    Phosphorus: 4.0 mg/dL (05-20 @ 06:31)                              7.4    9.06  )-----------( 349      ( 20 May 2024 06:31 )             21.6     Urine Studies:  Urinalysis - [05-20-24 @ 06:31]      Color  / Appearance  / SG  / pH       Gluc 207 / Ketone   / Bili  / Urobili        Blood  / Protein  / Leuk Est  / Nitrite       RBC  / WBC  / Hyaline  / Gran  / Sq Epi  / Non Sq Epi  / Bacteria       Iron 16, TIBC 121, %sat 13      [05-17-24 @ 06:00]  Ferritin 720      [05-17-24 @ 06:00]  TSH 2.60      [05-17-24 @ 06:00]  Lipid: chol 86, , HDL 27, LDL --      [05-17-24 @ 06:00]    HBsAb <3.0      [05-01-24 @ 14:42]  HBcAb Nonreact      [05-01-24 @ 14:42]      RADIOLOGY & ADDITIONAL STUDIES:

## 2024-05-20 NOTE — PROGRESS NOTE ADULT - ASSESSMENT
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 Baclofen started and course complicated by AMS second to baclofen toxicity requiring intubation and MICU transfer. Course further complicated by LEFT talya and cerebellum stroke, R AVF stenosis, aspiration and B Cereus bacteremia and RLE DVT. s/p trach and transferred to RCU 5/16    NEUROLOGY  # AMS   - MRI HEAD (5/6) with punctate foci in the left talya and left cerebellum with concern for acute infarct.   - MRA HEAD and NECK (5/6) with no large vessel occlusion or stenosis.  - Continue on aspirin and hold DAPT for now pending procedures   - Continue on lipitor for medical management   - Supportive care     CARDIOVASCULAR  # CP with NSTEMI vs demand ischemia with HTN   - Admitted for CP and HTN with peaked T WAVES and ECG with ST deviation on admission   - Troponins elevated on admission with negative CKMB   - TTE (4/30) with EF 72, normal LVRVSF, and no regional wall motion abnormalities   - Case discussed with cardiology and no acute need for cath. DAPT loaded on 5/4 and plan for medical management with ASA, lipitor and heparin GTT.     # Septic vs vasoplegic shock  # Hx of HTN   - Home BP medications held and pressors weaned off   - Continue on coreg 12.5 and hydral 100   - Monitor BP with goal 150/90s    RESPIRATORY  # Respiratory failure   - AMS noted with baclofen toxicity requiring intubation   - Attempted extubation in MICU however course complicated by aspiration and prolonged course and now s/p tracheostomy with IP on 5/14  - Continue on vent 12/500/5/30 and able to tolerate SBT 10/5 x several hours but limited by weakness   - Continue on nebs and HTS Q12H for now     GI  # Dysphagia   - s/p surgical PEG 5/17   - Continue on tube feeds via PEG     RENAL  # ESRD on HD MWF with R BCF  # Fistula stenosis ?  - VA AVF (5/2) with Right radiocephalic arteriovenous fistula has no hemodynamically significant stenosis present at the anastomotic site ( cm/sec, EDV 49 cm/sec). The usable segment is partially thrombosed with minimal patency.  - Required R FEMORAL line on medicine, then removed on 5/2, and replaced with R IJ SHILEY on 5/3 for CRRT then converted back to iHD MWF   - R SHILEY removed on 5/10 second to bacteremia and replaced on 5/13   - Continue on HD MWF per HD   - Continue on renvela 1600   - Continue on HCO3 650 tabs   - Plan for fistulogram next week with vascular    INFECTIOUS DISEASE  # Aspiration   - Recurrent fever spike 5/17 overnight and CXR with RLL opacity   - BCx and SCx negative, however fever spikes improved on ABX   - Zosyn continued empirically (5/18 - )     # B Cereus Bacteremia   - Course complicated by fevers and aspiration event   - MRSA (5/1) negative   - BCx (5/2) negative   - SCx (5/4) and BAL (5/12) negative   - BCx (5/10) with bacillus cereus and cleared on (5/12).   - Case discussed with ID and plans to continue on vanco through 5/21 x 10 day course. Dose per level.     HEME  # AOCD with ESRD   - Anemia panel with AOCD and low # sat   - Transfused on 5/16   - EPO 10K continued on TIW   - Ferrous supplement added   - Monitor HH     VASCULAR   # RLE DVT   - CT AP (5/12) with nonocclusive RIGHT common iliac vein deep venous thrombosis  - Continue on heparin GTT   - RLE precautions     ENDOCRINE  # IDDM2 A1C 6.9  - Continue on NPH 4U and ISS   - Monitor and adjust insulin based on FS     SKIN  - R FEMORAL (5/1-5/2)   - R IJ SHILEY (5/3-5/10)   - R IJ SHILEY (5/13 - )     ETHICS/ GOC    - FULL CODE     DISPO - Vent facility

## 2024-05-20 NOTE — PROGRESS NOTE ADULT - ASSESSMENT
73-year-old male past medical history hypertension, ESRD on dialysis Monday Wednesday Friday, diabetes insulin-dependent presents with hiccups patient endorses persistent hiccups for the last 2 days. Nephrology consulted for HD needs.    A/P  ESRD:  Center: [facility]  Nephrologist: Dr. Hardwick  Access: R AVF  MWF schedule  Vascular for fistulogram   s/p femoral shiley on 5/1, now removed  s/p placement of IJ shiley 5/3  Stopped CRRT   Restarted IHD  s/p HD 5/7 UF 1778; treatment terminated early due to hypotension   S/P MRA head/neck w/ gadolinium --> Received HD on 5/9 and 5/10.  5/10 Will need to dialyze 3 days consecutively--> plan for HD on 5/11 5/11 shiley removed due to bacteremia; did not receive HD.  Line holiday  5/12 - BUN worsened; K up trending.    5/13 Shiley placed.  5/17: s/p PEG placement.   Planned for fistulogram  + swelling of RUE.-follow up vascular  continue HD MWF   Consent obtained and placed in ED chart  Renal diet  Monitor BMP  consider [facility] for rehab when his outpatient Nephro-Dr. Hardwick can follow him    Hyperkalemia  Improved w/ HD.  C/W HD schedule as above.  Lokelma 10gm PRN for K >5.3 on non-HD days.  Low K diet.  Monitor closely     HTN:  BP fluctuating.  On carvedilol 12.5mg BID, hydralazine 100mg TID, nifedipine 60mg qd.  UF w/ HD.  Monitor BP.    Anemia:  + iron deficiency.  Tsat 13% - on ferrous sulfate 300mg qd via PEG.  Will hold venofer for now in view of up trending leukocytosis.  SILVA w/ HD.  Tranfuse for Hgb <7.  Monitor Hb.    CKD-MBD  Check PTH  PO4 better  continue Sevelamer 1600mg TID.  Low PO4 diet.  Monitor Ca, PO4 daily    Hypocalcemia:  In setting of CKD vs. hypoalbuminemia.  Optimize albumin.  Corrected Ca WNL.  Improving.  Monitor Ca.

## 2024-05-20 NOTE — PROGRESS NOTE ADULT - SUBJECTIVE AND OBJECTIVE BOX
Neurology Progress Note    S: Patient seen and examined    Medications: MEDICATIONS  (STANDING):  albuterol/ipratropium for Nebulization 3 milliLiter(s) Nebulizer every 12 hours  aspirin  chewable 81 milliGRAM(s) Oral daily  atorvastatin 20 milliGRAM(s) Oral at bedtime  carvedilol 12.5 milliGRAM(s) Oral every 12 hours  chlorhexidine 0.12% Liquid 15 milliLiter(s) Oral Mucosa every 12 hours  chlorhexidine 2% Cloths 1 Application(s) Topical <User Schedule>  dextrose 10% Bolus 125 milliLiter(s) IV Bolus once  dextrose 5%. 1000 milliLiter(s) (100 mL/Hr) IV Continuous <Continuous>  dextrose 5%. 1000 milliLiter(s) (50 mL/Hr) IV Continuous <Continuous>  dextrose 50% Injectable 12.5 Gram(s) IV Push once  dextrose 50% Injectable 25 Gram(s) IV Push once  epoetin reilly (EPOGEN) Injectable 91706 Unit(s) IV Push <User Schedule>  ferrous    sulfate Liquid 300 milliGRAM(s) Enteral Tube daily  glucagon  Injectable 1 milliGRAM(s) IntraMuscular once  heparin  Infusion. 1100 Unit(s)/Hr (11 mL/Hr) IV Continuous <Continuous>  hydrALAZINE 100 milliGRAM(s) Oral three times a day  insulin lispro (ADMELOG) corrective regimen sliding scale   SubCutaneous every 6 hours  insulin NPH human recombinant 4 Unit(s) SubCutaneous every 6 hours  piperacillin/tazobactam IVPB.. 3.375 Gram(s) IV Intermittent every 12 hours  sevelamer carbonate Powder 1600 milliGRAM(s) Oral every 8 hours  sodium bicarbonate 650 milliGRAM(s) Oral every 8 hours  sodium chloride 3%  Inhalation 4 milliLiter(s) Inhalation every 12 hours    MEDICATIONS  (PRN):  acetaminophen     Tablet .. 650 milliGRAM(s) Oral every 6 hours PRN Temp greater or equal to 38C (100.4F), Mild Pain (1 - 3)  dextrose Oral Gel 15 Gram(s) Oral once PRN Blood Glucose LESS THAN 70 milliGRAM(s)/deciliter  melatonin 3 milliGRAM(s) Oral at bedtime PRN Insomnia  sodium chloride 0.9% lock flush 10 milliLiter(s) IV Push every 1 hour PRN Pre/post blood products, medications, blood draw, and to maintain line patency       Vitals:  Vital Signs Last 24 Hrs  T(C): 36.8 (20 May 2024 04:00), Max: 37.2 (19 May 2024 16:00)  T(F): 98.3 (20 May 2024 04:00), Max: 98.9 (19 May 2024 16:00)  HR: 59 (20 May 2024 08:00) (52 - 65)  BP: 178/51 (20 May 2024 08:00) (126/49 - 178/51)  BP(mean): 77 (20 May 2024 04:00) (70 - 79)  RR: 12 (20 May 2024 04:00) (12 - 20)  SpO2: 97% (20 May 2024 07:50) (97% - 100%)    Parameters below as of 20 May 2024 07:50  Patient On (Oxygen Delivery Method): ventilator              General Exam:   General Appearance: + trach  Head: Normocephalic, atraumatic and no dysmorphic features  Ear, Nose, and Throat: Moist mucous membranes  CVS: S1S2+  Resp: mechanical  Abd: soft NTND  Extremities: No edema, no cyanosis  Skin: No bruises, no rashes     Neurological Exam:  Mental Status: Opens to voice, tracks minimally, follows simple commands.    Cranial Nerves: pupils 1-2mm, no facial asymmetry  Motor: slight inc tone throughout, uppers > lowers antigravity   Sensation:  intact      I personally reviewed the below data/images/labs:      LABS:                          7.4    9.06  )-----------( 349      ( 20 May 2024 06:31 )             21.6     05-20    130<L>  |  88<L>  |  68<H>  ----------------------------<  207<H>  3.9   |  27  |  6.25<H>    Ca    7.7<L>      20 May 2024 06:31  Phos  4.0     05-20  Mg     2.80     05-20        PTT - ( 20 May 2024 06:31 )  PTT:129.2 sec  Urinalysis Basic - ( 20 May 2024 06:31 )    Color: x / Appearance: x / SG: x / pH: x  Gluc: 207 mg/dL / Ketone: x  / Bili: x / Urobili: x   Blood: x / Protein: x / Nitrite: x   Leuk Esterase: x / RBC: x / WBC x   Sq Epi: x / Non Sq Epi: x / Bacteria: x            CT Head No Cont:  (01 May 2024 18:11)  < from: CT Head No Cont (05.01.24 @ 18:11) >    ACC: [id number] EXAM:  CT BRAIN   ORDERED BY: SHELBY MOREL     PROCEDURE DATE:  05/01/2024          INTERPRETATION:  CT OF THE HEAD WITHOUT CONTRAST    CLINICAL INDICATION: Lethargy.    TECHNIQUE: Volumetric CT acquisition was performed through the brain and   reviewed using brain and bone window technique.      COMPARISON: CT head from 1/17/2024    FINDINGS:    The ventricular and sulcal size and configuration is age appropriate.     There is no acute loss of gray-white differentiation. Stable bilateral   inferior frontal encephalomalacia and gliosis likely related to remote   trauma.    There is no evidence of mass effect, midline shift, acute intracranial   hemorrhage, or extra-axial collections.     The calvarium is intact. The paranasalsinuses are clear.The mastoid air   cells are predominantly clear. The orbits are unremarkable.      IMPRESSION:  No acute intracranial hemorrhage or acute territorial infarction. Chronic   findings as above.    --- End of Report ---          GILDARDO KHAN; Resident Radiologist  This document has been electronically signed.  LADARIUS DENNY MD; Attending Radiologist  This document has been electronically signed. May  1 2024  7:54PM    < end of copied text >      EEG Classification / Summary:  Abnormal EEG in the awake, drowsy states.   -Generalized sharp waves with triphasic morphology, initially appearing as frequent GPDs at 1-1.5 Hz, decreasing in prevalence later in recording.  -Variable moderate to mild diffuse slowing.  -No electrographic seizures are captured.     Clinical Impression:  -Generalized sharp waves with triphasic morphology can be seen in toxic-metabolic encephalopathies and indicate some risk of generalized seizures, especially when at higher frequencies than in this study. These decrease in prevalence over the course of recording.  -Variable moderate to mild diffuse cerebral dysfunction is nonspecific in etiology.   -No seizures x2 days of recording.    m< from: MR Head w/wo IV Cont (05.06.24 @ 18:10) >    ACC: [id number] EXAM:  MR BRAIN WAW IC   ORDERED BY: DOLORES QUICK     PROCEDURE DATE:  05/06/2024          INTERPRETATION:  CLINICAL INDICATION: Altered mental status.    TECHNIQUE: Multi-planar, multi-sequence magnetic resonance imaging of the   brain was performed with and without intravenous contrast.    CONTRAST: Post-contrast MR images were obtained following infusion of 5   mL of Gadavist    COMPARISON: No prior studies are available for comparison    FINDINGS:    VENTRICLES AND SULCI:  Normal.  INTRA-AXIAL: Punctate foci of restricted diffusion in the left talya and   left cerebellum with associated T2 prolongation consistent with acute   infarcts. No acute intracranial hemorrhage. Encephalomalacia/gliosis   noted in the inferior frontal lobes. No abnormal enhancement. There are   patchy and confluent foci of hyperintense T2 signal within the   subcortical and periventricular white matter which are nonspecific but   likely related to chronic microvascular ischemic disease.  EXTRA-AXIAL:  No mass or collection.  VISUALIZED SINUSES: Mild polypoid mucosal thickening. Fluid noted in the   nasopharynx.  VISUALIZED MASTOIDS:  Clear.  CALVARIUM:  Normal.  CAROTID FLOW VOIDS: Normal.  MISCELLANEOUS:  None.    IMPRESSION: PUNCTATE FOCI OF RESTRICTED DIFFUSION IN THE LEFT TALYA AND   LEFT CEREBELLUM WITH ASSOCIATED T2 PROLONGATION CONSISTENT WITH ACUTE   INFARCTS. NO ACUTE INTRACRANIAL HEMORRHAGE. NO AREA OF ABNORMAL   ENHANCEMENT.    < end of copied text >    m< from: MR Angio Head No Cont (05.09.24 @ 15:58) >    ACC: [id number] EXAM:  MR ANGIO NECK WAW IC   ORDERED BY: [employer]     ACC: [id number] EXAM:  MR ANGIO BRAIN   ORDERED BY: OMAR BUTTON     PROCEDURE DATE:  05/09/2024          INTERPRETATION:  .    CLINICAL INFORMATION: Stroke workup. Talya/cerebellar stroke.    TECHNIQUE: MRA images through the neck and [place] were obtained   using a combination of 2-D and 3-D time-of-flight acquisition. Post   contrast MR angiography of the neck was also performed. The data was then   reformatted intoa volumetric data set and reviewed as rotational MIP   images. 5 cc's of IV Gadavist was administered without immediate   complication. 2.5 cc's was discarded.    COMPARISON: Prior head CT exam dated 5/1/2024.    FINDINGS:    MRA Neck: There is a standard anatomic configuration to the aortic arch.    The origins of the great vessels appear unremarkable. The bilateral   common carotid arteries and carotid bifurcations appear unremarkable.    The bilateral cervical internal carotid arteries are within normal limits.    The origins of the bilateral vertebral arteries are normal. The bilateral   cervical vertebral arteries are normal in course and caliber.    MRA [place]: The bilateral intracranial internal carotid,   anterior, and middle cerebral arteries appear unremarkable.    The anterior and bilateral posterior communicating arteries are notable.    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 Otherwise, the   bilateral intradural vertebral arteries, vertebrobasilar junction,   basilar artery, and basilar tip appear unremarkable as well as the   bilateral posterior cerebral arteries.    IMPRESSION: No large vessel occlusion or stenosis.    < end of copied text >     Neurology Progress Note    S: Patient seen and examined    Medications: MEDICATIONS  (STANDING):  albuterol/ipratropium for Nebulization 3 milliLiter(s) Nebulizer every 12 hours  aspirin  chewable 81 milliGRAM(s) Oral daily  atorvastatin 20 milliGRAM(s) Oral at bedtime  carvedilol 12.5 milliGRAM(s) Oral every 12 hours  chlorhexidine 0.12% Liquid 15 milliLiter(s) Oral Mucosa every 12 hours  chlorhexidine 2% Cloths 1 Application(s) Topical <User Schedule>  dextrose 10% Bolus 125 milliLiter(s) IV Bolus once  dextrose 5%. 1000 milliLiter(s) (100 mL/Hr) IV Continuous <Continuous>  dextrose 5%. 1000 milliLiter(s) (50 mL/Hr) IV Continuous <Continuous>  dextrose 50% Injectable 12.5 Gram(s) IV Push once  dextrose 50% Injectable 25 Gram(s) IV Push once  epoetin reilly (EPOGEN) Injectable 21567 Unit(s) IV Push <User Schedule>  ferrous    sulfate Liquid 300 milliGRAM(s) Enteral Tube daily  glucagon  Injectable 1 milliGRAM(s) IntraMuscular once  heparin  Infusion. 1100 Unit(s)/Hr (11 mL/Hr) IV Continuous <Continuous>  hydrALAZINE 100 milliGRAM(s) Oral three times a day  insulin lispro (ADMELOG) corrective regimen sliding scale   SubCutaneous every 6 hours  insulin NPH human recombinant 4 Unit(s) SubCutaneous every 6 hours  piperacillin/tazobactam IVPB.. 3.375 Gram(s) IV Intermittent every 12 hours  sevelamer carbonate Powder 1600 milliGRAM(s) Oral every 8 hours  sodium bicarbonate 650 milliGRAM(s) Oral every 8 hours  sodium chloride 3%  Inhalation 4 milliLiter(s) Inhalation every 12 hours    MEDICATIONS  (PRN):  acetaminophen     Tablet .. 650 milliGRAM(s) Oral every 6 hours PRN Temp greater or equal to 38C (100.4F), Mild Pain (1 - 3)  dextrose Oral Gel 15 Gram(s) Oral once PRN Blood Glucose LESS THAN 70 milliGRAM(s)/deciliter  melatonin 3 milliGRAM(s) Oral at bedtime PRN Insomnia  sodium chloride 0.9% lock flush 10 milliLiter(s) IV Push every 1 hour PRN Pre/post blood products, medications, blood draw, and to maintain line patency       Vitals:  Vital Signs Last 24 Hrs  T(C): 36.8 (20 May 2024 04:00), Max: 37.2 (19 May 2024 16:00)  T(F): 98.3 (20 May 2024 04:00), Max: 98.9 (19 May 2024 16:00)  HR: 59 (20 May 2024 08:00) (52 - 65)  BP: 178/51 (20 May 2024 08:00) (126/49 - 178/51)  BP(mean): 77 (20 May 2024 04:00) (70 - 79)  RR: 12 (20 May 2024 04:00) (12 - 20)  SpO2: 97% (20 May 2024 07:50) (97% - 100%)    Parameters below as of 20 May 2024 07:50  Patient On (Oxygen Delivery Method): ventilator              General Exam:   General Appearance: + trach  Head: Normocephalic, atraumatic and no dysmorphic features  Ear, Nose, and Throat: Moist mucous membranes  CVS: S1S2+  Resp: mechanical  Abd: soft NTND  Extremities: No edema, no cyanosis  Skin: No bruises, no rashes     Neurological Exam:  Mental Status: Opens to voice, tracks minimally, follows simple commands.  No verbal output   Cranial Nerves: pupils 1-2mm, R facial palsy with smile  Motor: slight inc tone throughout, RUE and RLE drift.  Sensation:  intact      I personally reviewed the below data/images/labs:      LABS:                          7.4    9.06  )-----------( 349      ( 20 May 2024 06:31 )             21.6     05-20    130<L>  |  88<L>  |  68<H>  ----------------------------<  207<H>  3.9   |  27  |  6.25<H>    Ca    7.7<L>      20 May 2024 06:31  Phos  4.0     05-20  Mg     2.80     05-20        PTT - ( 20 May 2024 06:31 )  PTT:129.2 sec  Urinalysis Basic - ( 20 May 2024 06:31 )    Color: x / Appearance: x / SG: x / pH: x  Gluc: 207 mg/dL / Ketone: x  / Bili: x / Urobili: x   Blood: x / Protein: x / Nitrite: x   Leuk Esterase: x / RBC: x / WBC x   Sq Epi: x / Non Sq Epi: x / Bacteria: x            CT Head No Cont:  (01 May 2024 18:11)  < from: CT Head No Cont (05.01.24 @ 18:11) >    ACC: [id number] EXAM:  CT BRAIN   ORDERED BY: SHELBY MOREL     PROCEDURE DATE:  05/01/2024          INTERPRETATION:  CT OF THE HEAD WITHOUT CONTRAST    CLINICAL INDICATION: Lethargy.    TECHNIQUE: Volumetric CT acquisition was performed through the brain and   reviewed using brain and bone window technique.      COMPARISON: CT head from 1/17/2024    FINDINGS:    The ventricular and sulcal size and configuration is age appropriate.     There is no acute loss of gray-white differentiation. Stable bilateral   inferior frontal encephalomalacia and gliosis likely related to remote   trauma.    There is no evidence of mass effect, midline shift, acute intracranial   hemorrhage, or extra-axial collections.     The calvarium is intact. The paranasalsinuses are clear.The mastoid air   cells are predominantly clear. The orbits are unremarkable.      IMPRESSION:  No acute intracranial hemorrhage or acute territorial infarction. Chronic   findings as above.    --- End of Report ---          GILDARDO KHAN; Resident Radiologist  This document has been electronically signed.  LADARIUS DENNY MD; Attending Radiologist  This document has been electronically signed. May  1 2024  7:54PM    < end of copied text >      EEG Classification / Summary:  Abnormal EEG in the awake, drowsy states.   -Generalized sharp waves with triphasic morphology, initially appearing as frequent GPDs at 1-1.5 Hz, decreasing in prevalence later in recording.  -Variable moderate to mild diffuse slowing.  -No electrographic seizures are captured.     Clinical Impression:  -Generalized sharp waves with triphasic morphology can be seen in toxic-metabolic encephalopathies and indicate some risk of generalized seizures, especially when at higher frequencies than in this study. These decrease in prevalence over the course of recording.  -Variable moderate to mild diffuse cerebral dysfunction is nonspecific in etiology.   -No seizures x2 days of recording.    m< from: MR Head w/wo IV Cont (05.06.24 @ 18:10) >    ACC: [id number] EXAM:  MR BRAIN WAW IC   ORDERED BY: DOLORES QUICK     PROCEDURE DATE:  05/06/2024          INTERPRETATION:  CLINICAL INDICATION: Altered mental status.    TECHNIQUE: Multi-planar, multi-sequence magnetic resonance imaging of the   brain was performed with and without intravenous contrast.    CONTRAST: Post-contrast MR images were obtained following infusion of 5   mL of Gadavist    COMPARISON: No prior studies are available for comparison    FINDINGS:    VENTRICLES AND SULCI:  Normal.  INTRA-AXIAL: Punctate foci of restricted diffusion in the left talya and   left cerebellum with associated T2 prolongation consistent with acute   infarcts. No acute intracranial hemorrhage. Encephalomalacia/gliosis   noted in the inferior frontal lobes. No abnormal enhancement. There are   patchy and confluent foci of hyperintense T2 signal within the   subcortical and periventricular white matter which are nonspecific but   likely related to chronic microvascular ischemic disease.  EXTRA-AXIAL:  No mass or collection.  VISUALIZED SINUSES: Mild polypoid mucosal thickening. Fluid noted in the   nasopharynx.  VISUALIZED MASTOIDS:  Clear.  CALVARIUM:  Normal.  CAROTID FLOW VOIDS: Normal.  MISCELLANEOUS:  None.    IMPRESSION: PUNCTATE FOCI OF RESTRICTED DIFFUSION IN THE LEFT TALYA AND   LEFT CEREBELLUM WITH ASSOCIATED T2 PROLONGATION CONSISTENT WITH ACUTE   INFARCTS. NO ACUTE INTRACRANIAL HEMORRHAGE. NO AREA OF ABNORMAL   ENHANCEMENT.    < end of copied text >    m< from: MR Angio Head No Cont (05.09.24 @ 15:58) >    ACC: [id number] EXAM:  MR ANGIO NECK WAW IC   ORDERED BY: [employer]     ACC: [id number] EXAM:  MR ANGIO BRAIN   ORDERED BY: OMAR BUTTON     PROCEDURE DATE:  05/09/2024          INTERPRETATION:  .    CLINICAL INFORMATION: Stroke workup. Talya/cerebellar stroke.    TECHNIQUE: MRA images through the neck and [place] were obtained   using a combination of 2-D and 3-D time-of-flight acquisition. Post   contrast MR angiography of the neck was also performed. The data was then   reformatted intoa volumetric data set and reviewed as rotational MIP   images. 5 cc's of IV Gadavist was administered without immediate   complication. 2.5 cc's was discarded.    COMPARISON: Prior head CT exam dated 5/1/2024.    FINDINGS:    MRA Neck: There is a standard anatomic configuration to the aortic arch.    The origins of the great vessels appear unremarkable. The bilateral   common carotid arteries and carotid bifurcations appear unremarkable.    The bilateral cervical internal carotid arteries are within normal limits.    The origins of the bilateral vertebral arteries are normal. The bilateral   cervical vertebral arteries are normal in course and caliber.    MRA [place]: The bilateral intracranial internal carotid,   anterior, and middle cerebral arteries appear unremarkable.    The anterior and bilateral posterior communicating arteries are notable.    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 Otherwise, the   bilateral intradural vertebral arteries, vertebrobasilar junction,   basilar artery, and basilar tip appear unremarkable as well as the   bilateral posterior cerebral arteries.    IMPRESSION: No large vessel occlusion or stenosis.    < end of copied text >

## 2024-05-20 NOTE — PROGRESS NOTE ADULT - ASSESSMENT
74 y/o M PMhx HTN, ESRD (on HD M/W/F), DM who presented with hiccups x 2 days and chest pain found to have peaked T waves. Hospital course c/b AMS s/p RRT on 5/1 and 5/2. Vascular consulted 2/2 RUE AVF access unable to be used s/p R femoral shiley placed for emergent HD. Transferred to MICU and intubated 5/2 for airway protection.   received empiric course of zosyn x 5 days, completed 5/7 and meropenem x 5 days, completed 5/14    PNA, fever  febrile 5/18  CXR- Hazy opacity in the right lower lung field    B. cereus bacteremia  blood cx 5/10 w/ Bacillus cereus in 1/4 bottles  repeat blood cultures 5/11- NGTD     AMS, CVA  TTE- no evidence of valvular disease  s/p LP 5/2, viral encephalitis/ meningitis ruled out  MRI- Punctate foci of restricted diffusion in the left talya and left cerebellum with associated T2 prolongation consistent with acute infarcts  s/p trach 5/14, s/p PEG 5/17    DVT  CT- Nonocclusive R common iliac vein deep venous thrombosis.    Recommendations  complete 10 day course of vanc for B cereus bacteremia w/ last day today 5/20  c/w zosyn for PNA  would treat for 5 days w/ last day 5/23    Steven Torres M.D.  \[facility]\"", Division of Infectious Diseases  [phone]  After 5pm on weekdays and all day on weekends - please call [phone]

## 2024-05-20 NOTE — CHART NOTE - NSCHARTNOTEFT_GEN_A_CORE
ACP NIGHT MEDICINE COVERAGE    Notified by RN that pt had bleeding from LUE IV site while on heparin gtt, hemostasis achievement prior to writer's assessment.  Pt seen at bedside during HD session.  Pt awake, denies any pain, no active bleeding observed from IV access in L upper arm, aPTT found to be therapeutic - 70.5.  Pt tolerating HD w/o issue.  BP noted to be elevated during HD, pt due for PM Coreg following HD completion.  D/w RN, pt, and family member at bedside.    Vital Signs Last 24 Hrs  T(C): 36.9 (20 May 2024 21:15), Max: 37.2 (20 May 2024 13:00)  T(F): 98.5 (20 May 2024 21:15), Max: 98.9 (20 May 2024 13:00)  HR: 65 (20 May 2024 21:15) (52 - 66)  BP: 165/59 (20 May 2024 21:15) (114/68 - 178/51)  BP(mean): 77 (20 May 2024 04:00) (70 - 77)  RR: 22 (20 May 2024 21:15) (12 - 22)  SpO2: 100% (20 May 2024 20:34) (95% - 100%)  O2 Parameters below as of 20 May 2024 20:34  Patient On (Oxygen Delivery Method): ventilator    Bowen Kendall PA-C  Department of Hospital Medicine - Night ACP  In-House Pager: #[phone]

## 2024-05-20 NOTE — PROGRESS NOTE ADULT - SUBJECTIVE AND OBJECTIVE BOX
[facility], Division of Infectious Diseases  AUGUST Carbajal Y. Patel, S. Shah, G. Brian  [phone]  ([phone] - weekdays after 5pm and weekends)    Name: JIMMY BLAND  Age/Gender: 73y Male  MRN: [id number]    Interval History:  Notes reviewed.   No concerning overnight events.  Afebrile.   spouse at bedside    Allergies: No Known Allergies      Objective:  Vitals:   T(F): 98.6 (05-20-24 @ 10:35), Max: 98.9 (05-19-24 @ 16:00)  HR: 60 (05-20-24 @ 10:35) (52 - 65)  BP: 141/47 (05-20-24 @ 10:35) (126/49 - 178/51)  RR: 20 (05-20-24 @ 10:35) (12 - 20)  SpO2: 98% (05-20-24 @ 10:35) (97% - 100%)  Physical Examination:  General: no acute distress  HEENT: anicteric, NGT, tracheostomy, on vent  Lungs: clear to auscultation anteriorly  Heart: S1, S2, normal rate, RIJ shiley  Abdomen: Soft. ND. NT  Extremities: No LE edema.   Skin: Warm. Dry.     Laboratory Studies:  CBC:                       7.4    9.06  )-----------( 349      ( 20 May 2024 06:31 )             21.6     WBC Trend:  9.06 05-20-24 @ 06:31  10.08 05-19-24 @ 05:50  7.35 05-18-24 @ 03:45  7.71 05-17-24 @ 22:30  10.59 05-17-24 @ 06:00  9.46 05-16-24 @ 16:55  10.87 05-16-24 @ 01:00  9.67 05-15-24 @ 01:15  13.53 05-14-24 @ 00:13    CMP: 05-20    130<L>  |  88<L>  |  68<H>  ----------------------------<  207<H>  3.9   |  27  |  6.25<H>    Ca    7.7<L>      20 May 2024 06:31  Phos  4.0     05-20  Mg     2.80     05-20          Vancomycin Level, Random: 17.1 ug/mL (05-20-24 @ 06:31)  Vancomycin Level, Random: 18.6 ug/mL (05-19-24 @ 05:50)  Vancomycin Level, Random: 17.5 ug/mL (05-17-24 @ 22:30)  Vancomycin Level, Random: 23.8 ug/mL (05-17-24 @ 06:00)    Urinalysis Basic - ( 20 May 2024 06:31 )    Color: x / Appearance: x / SG: x / pH: x  Gluc: 207 mg/dL / Ketone: x  / Bili: x / Urobili: x   Blood: x / Protein: x / Nitrite: x   Leuk Esterase: x / RBC: x / WBC x   Sq Epi: x / Non Sq Epi: x / Bacteria: x      Microbiology: reviewed     Culture - Sputum (collected 05-18-24 @ 04:30)  Source: .Sputum Sputum  Gram Stain (05-18-24 @ 21:05):    Few polymorphonuclear leukocytes per low power field    No Squamous epithelial cells per low power field    No organisms seen per oil power field  Final Report (05-20-24 @ 07:02):    Normal Respiratory Jocelyn present    Culture - Blood (collected 05-18-24 @ 04:05)  Source: .Blood Blood-Venous  Preliminary Report (05-20-24 @ 11:02):    No growth at 48 Hours    Culture - Blood (collected 05-18-24 @ 03:45)  Source: .Blood Blood-Venous  Preliminary Report (05-20-24 @ 09:02):    No growth at 48 Hours    Culture - Fungal, Bronchial (collected 05-12-24 @ 15:06)  Source: .Bronchial Bronchial Lavage  Preliminary Report (05-15-24 @ 23:03):    Culture is being performed. Fungal cultures are held for 4 weeks.    Culture - Acid Fast - Bronchial w/Smear (collected 05-12-24 @ 15:06)  Source: .Bronchial Bronchial Lavage  Preliminary Report (05-15-24 @ 23:09):    Culture is being performed.    Culture - Bronchial (collected 05-12-24 @ 15:06)  Source: .Bronchial Bronchial Lavage  Gram Stain (05-12-24 @ 22:40):    Moderate polymorphonuclear leukocytes per low power field    Rare Squamous epithelial cells per low power field    Rare Gram Positive Cocci in Clusters per oil power field  Final Report (05-14-24 @ 08:10):    Normal Respiratory Jocelyn present    Culture - Blood (collected 05-11-24 @ 14:00)  Source: .Blood Blood-Peripheral  Final Report (05-16-24 @ 19:01):    No growth at 5 days    Culture - Blood (collected 05-11-24 @ 14:00)  Source: .Blood Blood-Peripheral  Final Report (05-16-24 @ 19:01):    No growth at 5 days    Culture - Sputum (collected 05-10-24 @ 16:45)  Source: .Sputum Sputum  Gram Stain (05-10-24 @ 22:57):    Rare polymorphonuclear leukocytes per low power field    Rare Squamous epithelial cells per low power field    Rare Gram Negative Rods per oil power field  Final Report (05-12-24 @ 16:49):    Normal Respiratory Jocelyn present    Culture - Blood (collected 05-10-24 @ 04:00)  Source: .Blood Blood-Peripheral  Final Report (05-15-24 @ 07:01):    No growth at 5 days    Culture - Blood (collected 05-10-24 @ 03:45)  Source: .Blood Blood-Peripheral  Gram Stain (05-10-24 @ 19:34):    Growth in anaerobic bottle: Gram Positive Rods  Final Report (05-11-24 @ 14:47):    Growth in anaerobic bottle: Bacillus cereus "Susceptibilities not    performed"    Direct identification is available within approximately 3-5    hours either by Blood Panel Multiplexed PCR or Direct    MALDI-TOF. Details: [email or web address]  Organism: Blood Culture PCR (05-11-24 @ 14:47)  Organism: Blood Culture PCR (05-11-24 @ 14:47)      Method Type: PCR      -  Blood PCR Panel: NEG    Culture - Sputum (collected 05-08-24 @ 10:05)  Source: ET Tube ET Tube  Gram Stain (05-08-24 @ 20:37):    Few polymorphonuclear leukocytes per low power field    No Squamous epithelial cells per low power field    No organisms seen per oil power field  Final Report (05-10-24 @ 11:45):    Normal Respiratory Jocelyn present        Radiology: reviewed     Medications:  acetaminophen     Tablet .. 650 milliGRAM(s) Oral every 6 hours PRN  albuterol/ipratropium for Nebulization 3 milliLiter(s) Nebulizer every 12 hours  aspirin  chewable 81 milliGRAM(s) Oral daily  atorvastatin 20 milliGRAM(s) Oral at bedtime  carvedilol 12.5 milliGRAM(s) Oral every 12 hours  chlorhexidine 0.12% Liquid 15 milliLiter(s) Oral Mucosa every 12 hours  chlorhexidine 2% Cloths 1 Application(s) Topical <User Schedule>  dextrose 10% Bolus 125 milliLiter(s) IV Bolus once  dextrose 5%. 1000 milliLiter(s) IV Continuous <Continuous>  dextrose 5%. 1000 milliLiter(s) IV Continuous <Continuous>  dextrose 50% Injectable 12.5 Gram(s) IV Push once  dextrose 50% Injectable 25 Gram(s) IV Push once  dextrose Oral Gel 15 Gram(s) Oral once PRN  epoetin reilly (EPOGEN) Injectable 24896 Unit(s) IV Push <User Schedule>  ferrous    sulfate Liquid 300 milliGRAM(s) Enteral Tube daily  glucagon  Injectable 1 milliGRAM(s) IntraMuscular once  heparin  Infusion. 1100 Unit(s)/Hr IV Continuous <Continuous>  hydrALAZINE 100 milliGRAM(s) Oral three times a day  insulin lispro (ADMELOG) corrective regimen sliding scale   SubCutaneous every 6 hours  insulin NPH human recombinant 4 Unit(s) SubCutaneous every 6 hours  melatonin 3 milliGRAM(s) Oral at bedtime PRN  piperacillin/tazobactam IVPB.. 3.375 Gram(s) IV Intermittent every 12 hours  sevelamer carbonate Powder 1600 milliGRAM(s) Oral every 8 hours  sodium bicarbonate 650 milliGRAM(s) Oral every 8 hours  sodium chloride 0.9% lock flush 10 milliLiter(s) IV Push every 1 hour PRN  sodium chloride 3%  Inhalation 4 milliLiter(s) Inhalation every 12 hours    Antimicrobials:  piperacillin/tazobactam IVPB.. 3.375 Gram(s) IV Intermittent every 12 hours

## 2024-05-20 NOTE — PROGRESS NOTE ADULT - ASSESSMENT
73-year-old male past medical history hypertension, ESRD on dialysis Monday Wednesday Friday, diabetes insulin-dependent presents with hiccups patient endorses persistent hiccups for the last 2 days.   found to have peaked T waves, a new finding. denied dizziness, N/V, denies CP, palps, abd pain  Upon admission seen by CArd, renal and GI  found to have a distended GB prob 2/2 gastroparesis, was started on Reglan  has received 4 doses of baclofen since 4/30 2/2 hiccups, last dose on 5/1 at 5 am  had received Haldol for agitation at 11 pm on 4/30, AMS observed in pm of 5/1  AMS ongoing, RRT x 2 called  now transferred to MICU for encephalopathy requiring  airway protection on 5/2,  intubated for airway protection, was on  propofol and pressors. now off  toxicology consulted upon dx of encephalopathy, not impressed AMS 2/2 Haldol nor baclofen . AMS was 2/2 acute CVA   HD was not done timely until femoral shiley was placed 2/2 clotted RUE AVF. now removed and RIJ shiley placed on 5/3  has been nonverbal since pm 5/1.     MR brain 5/6 c/w L pontine and L cerebellar acute infarcts, no hemorrhage . as per neuro: embolic in nature.   encephalopathy probably 2/2 acute CVA, now follows commands appropriately off sedation.   no SZ focus on EEG,   off CRRT,  HD resumed as per renal.   remains intubated, now trached  off keppra  AC resumed, on Heparin drip for R common iliac DVT  completed 5 days of empiric ZOSYN on 5/7, shortly after became septic again after an extubation trial. bacteremia w B. cereus, on Vanc through 5/20 as per ID    s/p trache placement by pulm,   IR unable to find a window for G-J tube. PEG placed by surgery 5/17, resume feeds when cleared by surgery  HD via R IJ.  Tmax overnight( 5/18)  101, cxr c/w RLL PNA, now on Zosyn, blood Cx s sent. remains on Vanco for B. cereus bacteremia   leukocytosis resolved  EKG changes on 5/3 c/w NSTEMI loaded w DAPT , was  on Heparin drip x 48 hrs. rpt TTE is unchanged  ID, Neuro , renal, cardiology following.  clotted RUE AVF. fistulogram some time this week w Dr. Lockett,  vascular  HD via RIght IJ Shiley.   LP done, no sign of meningitis.   NEUROLOGY     - CTH without acute findings   - LP done, no acute findings  - MR brain w acute infarcts: L talya and L cerebellum, nonhemorrhagic  - no Sz focus on EEG    CARDIOVASCULAR   - ptn never had CP. just peaked T waves. was awaiting ischemic study w nucl stress test  - TTE showing EF 72%, rpt unchanged  - EKG changes on 5/3, loaded w DAPT now on Heparin drip 2/2 DVT    GI  - PEG placed, npo w tube feeds  - Gastroparesis       RENAL   -  HD as per renal  - via R IJ shiley  -  fistula study of RUE  as per VAscular next week      INFECTIOUS DISEASE     completed a course of Zosyn, then became septic, bacteremic a B. cereus, completed 3 days of Meropenem, now on vanc through 5/20  on Zosyn since 5/18 for RLL PNA, afebrile    ENDOCRINE   - DM  - cw ISS    GOC:  Full code

## 2024-05-20 NOTE — PROGRESS NOTE ADULT - SUBJECTIVE AND OBJECTIVE BOX
Cardiovascular Disease Progress Note  Date of service: 24 @ 13:13    Overnight events: No acute events overnight.  Patient is in no distress.   Otherwise review of systems negative    Objective Findings:  T(C): 37 (24 @ 10:35), Max: 37.2 (24 @ 16:00)  HR: 60 (24 @ 10:35) (52 - 65)  BP: 141/47 (24 @ 10:35) (126/49 - 178/51)  RR: 20 (24 @ 10:35) (12 - 20)  SpO2: 98% (24 @ 10:35) (97% - 100%)  Wt(kg): --  Daily     Daily Weight in k.3 (20 May 2024 04:00)      Physical Exam:  Gen: NAD; Patient resting comfortably  HEENT: EOMI, Normocephalic/ atraumatic  CV: RRR, normal S1 + S2, no m/r/g  Lungs:  Trach in place.   Abd: soft, non-tender; bowel sounds present  Ext: No edema; warm and well perfused    Telemetry:    Laboratory Data:                        7.4    9.06  )-----------( 349      ( 20 May 2024 06:31 )             21.6     05-    130<L>  |  88<L>  |  68<H>  ----------------------------<  207<H>  3.9   |  27  |  6.25<H>    Ca    7.7<L>      20 May 2024 06:31  Phos  4.0       Mg     2.80           PTT - ( 20 May 2024 06:31 )  PTT:129.2 sec          Inpatient Medications:  MEDICATIONS  (STANDING):  albuterol/ipratropium for Nebulization 3 milliLiter(s) Nebulizer every 12 hours  aspirin  chewable 81 milliGRAM(s) Oral daily  atorvastatin 20 milliGRAM(s) Oral at bedtime  carvedilol 12.5 milliGRAM(s) Oral every 12 hours  chlorhexidine 0.12% Liquid 15 milliLiter(s) Oral Mucosa every 12 hours  chlorhexidine 2% Cloths 1 Application(s) Topical <User Schedule>  dextrose 10% Bolus 125 milliLiter(s) IV Bolus once  dextrose 5%. 1000 milliLiter(s) (50 mL/Hr) IV Continuous <Continuous>  dextrose 5%. 1000 milliLiter(s) (100 mL/Hr) IV Continuous <Continuous>  dextrose 50% Injectable 12.5 Gram(s) IV Push once  dextrose 50% Injectable 25 Gram(s) IV Push once  epoetin reilly (EPOGEN) Injectable 71258 Unit(s) IV Push <User Schedule>  ferrous    sulfate Liquid 300 milliGRAM(s) Enteral Tube daily  glucagon  Injectable 1 milliGRAM(s) IntraMuscular once  heparin  Infusion. 1100 Unit(s)/Hr (11 mL/Hr) IV Continuous <Continuous>  hydrALAZINE 100 milliGRAM(s) Oral three times a day  insulin lispro (ADMELOG) corrective regimen sliding scale   SubCutaneous every 6 hours  insulin NPH human recombinant 4 Unit(s) SubCutaneous every 6 hours  piperacillin/tazobactam IVPB.. 3.375 Gram(s) IV Intermittent every 12 hours  sevelamer carbonate Powder 1600 milliGRAM(s) Oral every 8 hours  sodium bicarbonate 650 milliGRAM(s) Oral every 8 hours  sodium chloride 3%  Inhalation 4 milliLiter(s) Inhalation every 12 hours      Assessment:  73 year old man with HTN, HLD, T2DM on insulin, and ESRD on HD presents with supply demand ischemia and angina.    Plan of Care:    #Type II myocardial infarction-  Secondary to distributive shock earlier on admission.   The repeat echo shows no new wall motion abnormality.   I would not pursue cath at this time given recurrent aspiration and chronic respiratory failure s/p trach .   The patient is optimized from a cardiac standpoint to proceed with fistulogram.        #Sinus bradycardia-  No evidence of AV block on admission EKG or telemetry.  No indication for pacing at this time.   - Continue Coreg with holding parameters.     #HTN-  BP labile.  Monitor trends.     #ESRD-  Defer to renal     #DVT   - R common Iliac DVT  - Hep GTT  - AC management as per vascular team.            Over 55 minutes spent on total encounter; more than 50% of the visit was spent counseling and/or coordinating care by the attending physician.      Tico Bravo DO [facility]  Cardiovascular Disease  [phone]

## 2024-05-21 LAB
ANION GAP SERPL CALC-SCNC: 15 MMOL/L — HIGH (ref 7–14)
APTT BLD: 61.5 SEC — HIGH (ref 24.5–35.6)
BUN SERPL-MCNC: 33 MG/DL — HIGH (ref 7–23)
CALCIUM SERPL-MCNC: 8 MG/DL — LOW (ref 8.4–10.5)
CHLORIDE SERPL-SCNC: 92 MMOL/L — LOW (ref 98–107)
CO2 SERPL-SCNC: 26 MMOL/L — SIGNIFICANT CHANGE UP (ref 22–31)
CREAT SERPL-MCNC: 3.58 MG/DL — HIGH (ref 0.5–1.3)
EGFR: 17 ML/MIN/1.73M2 — LOW
GLUCOSE BLDC GLUCOMTR-MCNC: 217 MG/DL — HIGH (ref 70–99)
GLUCOSE BLDC GLUCOMTR-MCNC: 228 MG/DL — HIGH (ref 70–99)
GLUCOSE BLDC GLUCOMTR-MCNC: 229 MG/DL — HIGH (ref 70–99)
GLUCOSE BLDC GLUCOMTR-MCNC: 235 MG/DL — HIGH (ref 70–99)
GLUCOSE SERPL-MCNC: 219 MG/DL — HIGH (ref 70–99)
HCT VFR BLD CALC: 23.6 % — LOW (ref 39–50)
HGB BLD-MCNC: 7.9 G/DL — LOW (ref 13–17)
MAGNESIUM SERPL-MCNC: 2.4 MG/DL — SIGNIFICANT CHANGE UP (ref 1.6–2.6)
MCHC RBC-ENTMCNC: 20.7 PG — LOW (ref 27–34)
MCHC RBC-ENTMCNC: 33.5 GM/DL — SIGNIFICANT CHANGE UP (ref 32–36)
MCV RBC AUTO: 61.8 FL — LOW (ref 80–100)
NRBC # BLD: 0 /100 WBCS — SIGNIFICANT CHANGE UP (ref 0–0)
NRBC # FLD: 0 K/UL — SIGNIFICANT CHANGE UP (ref 0–0)
PHOSPHATE SERPL-MCNC: 1.5 MG/DL — LOW (ref 2.5–4.5)
PLATELET # BLD AUTO: 378 K/UL — SIGNIFICANT CHANGE UP (ref 150–400)
POTASSIUM SERPL-MCNC: 3.4 MMOL/L — LOW (ref 3.5–5.3)
POTASSIUM SERPL-SCNC: 3.4 MMOL/L — LOW (ref 3.5–5.3)
RBC # BLD: 3.82 M/UL — LOW (ref 4.2–5.8)
RBC # FLD: 20.3 % — HIGH (ref 10.3–14.5)
SODIUM SERPL-SCNC: 133 MMOL/L — LOW (ref 135–145)
VANCOMYCIN FLD-MCNC: 21.8 UG/ML — SIGNIFICANT CHANGE UP
WBC # BLD: 10.27 K/UL — SIGNIFICANT CHANGE UP (ref 3.8–10.5)
WBC # FLD AUTO: 10.27 K/UL — SIGNIFICANT CHANGE UP (ref 3.8–10.5)

## 2024-05-21 PROCEDURE — 99233 SBSQ HOSP IP/OBS HIGH 50: CPT

## 2024-05-21 PROCEDURE — 71045 X-RAY EXAM CHEST 1 VIEW: CPT | Mod: 26

## 2024-05-21 RX ORDER — SODIUM,POTASSIUM PHOSPHATES 278-250MG
1 POWDER IN PACKET (EA) ORAL ONCE
Refills: 0 | Status: COMPLETED | OUTPATIENT
Start: 2024-05-21 | End: 2024-05-21

## 2024-05-21 RX ORDER — POTASSIUM CHLORIDE 20 MEQ
40 PACKET (EA) ORAL ONCE
Refills: 0 | Status: COMPLETED | OUTPATIENT
Start: 2024-05-21 | End: 2024-05-21

## 2024-05-21 RX ORDER — ACETAMINOPHEN 500 MG
650 TABLET ORAL EVERY 6 HOURS
Refills: 0 | Status: DISCONTINUED | OUTPATIENT
Start: 2024-05-21 | End: 2024-06-14

## 2024-05-21 RX ADMIN — CHLORHEXIDINE GLUCONATE 15 MILLILITER(S): 213 SOLUTION TOPICAL at 17:35

## 2024-05-21 RX ADMIN — ATORVASTATIN CALCIUM 20 MILLIGRAM(S): 80 TABLET, FILM COATED ORAL at 21:41

## 2024-05-21 RX ADMIN — HUMAN INSULIN 4 UNIT(S): 100 INJECTION, SUSPENSION SUBCUTANEOUS at 00:50

## 2024-05-21 RX ADMIN — Medication 100 MILLIGRAM(S): at 21:40

## 2024-05-21 RX ADMIN — Medication 300 MILLIGRAM(S): at 11:33

## 2024-05-21 RX ADMIN — HUMAN INSULIN 4 UNIT(S): 100 INJECTION, SUSPENSION SUBCUTANEOUS at 23:44

## 2024-05-21 RX ADMIN — HUMAN INSULIN 4 UNIT(S): 100 INJECTION, SUSPENSION SUBCUTANEOUS at 06:08

## 2024-05-21 RX ADMIN — Medication 3 MILLILITER(S): at 09:08

## 2024-05-21 RX ADMIN — HUMAN INSULIN 4 UNIT(S): 100 INJECTION, SUSPENSION SUBCUTANEOUS at 17:35

## 2024-05-21 RX ADMIN — Medication 100 MILLIGRAM(S): at 04:42

## 2024-05-21 RX ADMIN — SODIUM CHLORIDE 4 MILLILITER(S): 9 INJECTION INTRAMUSCULAR; INTRAVENOUS; SUBCUTANEOUS at 09:09

## 2024-05-21 RX ADMIN — Medication 4: at 06:06

## 2024-05-21 RX ADMIN — Medication 2: at 00:50

## 2024-05-21 RX ADMIN — Medication 3 MILLILITER(S): at 20:23

## 2024-05-21 RX ADMIN — Medication 4: at 11:33

## 2024-05-21 RX ADMIN — SEVELAMER CARBONATE 1600 MILLIGRAM(S): 2400 POWDER, FOR SUSPENSION ORAL at 05:14

## 2024-05-21 RX ADMIN — CARVEDILOL PHOSPHATE 12.5 MILLIGRAM(S): 80 CAPSULE, EXTENDED RELEASE ORAL at 08:36

## 2024-05-21 RX ADMIN — Medication 1 PACKET(S): at 11:32

## 2024-05-21 RX ADMIN — Medication 650 MILLIGRAM(S): at 05:14

## 2024-05-21 RX ADMIN — CHLORHEXIDINE GLUCONATE 15 MILLILITER(S): 213 SOLUTION TOPICAL at 05:13

## 2024-05-21 RX ADMIN — Medication 4: at 23:43

## 2024-05-21 RX ADMIN — Medication 100 MILLIGRAM(S): at 00:51

## 2024-05-21 RX ADMIN — Medication 81 MILLIGRAM(S): at 11:28

## 2024-05-21 RX ADMIN — SODIUM CHLORIDE 4 MILLILITER(S): 9 INJECTION INTRAMUSCULAR; INTRAVENOUS; SUBCUTANEOUS at 20:23

## 2024-05-21 RX ADMIN — HUMAN INSULIN 4 UNIT(S): 100 INJECTION, SUSPENSION SUBCUTANEOUS at 11:33

## 2024-05-21 RX ADMIN — Medication 40 MILLIEQUIVALENT(S): at 11:32

## 2024-05-21 RX ADMIN — PIPERACILLIN AND TAZOBACTAM 25 GRAM(S): 4; .5 INJECTION, POWDER, LYOPHILIZED, FOR SOLUTION INTRAVENOUS at 05:13

## 2024-05-21 RX ADMIN — Medication 4: at 17:35

## 2024-05-21 RX ADMIN — Medication 650 MILLIGRAM(S): at 13:22

## 2024-05-21 RX ADMIN — Medication 100 MILLIGRAM(S): at 11:28

## 2024-05-21 RX ADMIN — PIPERACILLIN AND TAZOBACTAM 25 GRAM(S): 4; .5 INJECTION, POWDER, LYOPHILIZED, FOR SOLUTION INTRAVENOUS at 17:36

## 2024-05-21 RX ADMIN — Medication 650 MILLIGRAM(S): at 21:41

## 2024-05-21 RX ADMIN — CHLORHEXIDINE GLUCONATE 1 APPLICATION(S): 213 SOLUTION TOPICAL at 05:13

## 2024-05-21 NOTE — PROGRESS NOTE ADULT - SUBJECTIVE AND OBJECTIVE BOX
Patient is a 73y old  Male who presents with a chief complaint of Unstable angina, abn EKG (21 May 2024 15:28)      SUBJECTIVE / OVERNIGHT EVENTS: PAS and RUE fistulogram planned for 5/24( fri),     MEDICATIONS  (STANDING):  albuterol/ipratropium for Nebulization 3 milliLiter(s) Nebulizer every 12 hours  aspirin  chewable 81 milliGRAM(s) Oral daily  atorvastatin 20 milliGRAM(s) Oral at bedtime  carvedilol 12.5 milliGRAM(s) Oral every 12 hours  chlorhexidine 0.12% Liquid 15 milliLiter(s) Oral Mucosa every 12 hours  chlorhexidine 2% Cloths 1 Application(s) Topical <User Schedule>  dextrose 10% Bolus 125 milliLiter(s) IV Bolus once  dextrose 5%. 1000 milliLiter(s) (100 mL/Hr) IV Continuous <Continuous>  dextrose 5%. 1000 milliLiter(s) (50 mL/Hr) IV Continuous <Continuous>  dextrose 50% Injectable 12.5 Gram(s) IV Push once  dextrose 50% Injectable 25 Gram(s) IV Push once  epoetin reilly (EPOGEN) Injectable 15150 Unit(s) IV Push <User Schedule>  ferrous    sulfate Liquid 300 milliGRAM(s) Enteral Tube daily  glucagon  Injectable 1 milliGRAM(s) IntraMuscular once  heparin  Infusion. 1100 Unit(s)/Hr (11 mL/Hr) IV Continuous <Continuous>  hydrALAZINE 100 milliGRAM(s) Oral three times a day  insulin lispro (ADMELOG) corrective regimen sliding scale   SubCutaneous every 6 hours  insulin NPH human recombinant 4 Unit(s) SubCutaneous every 6 hours  piperacillin/tazobactam IVPB.. 3.375 Gram(s) IV Intermittent every 12 hours  sodium bicarbonate 650 milliGRAM(s) Oral every 8 hours  sodium chloride 3%  Inhalation 4 milliLiter(s) Inhalation every 12 hours    MEDICATIONS  (PRN):  acetaminophen   Oral Liquid .. 650 milliGRAM(s) Oral every 6 hours PRN Temp greater or equal to 38C (100.4F), Moderate Pain (4 - 6)  dextrose Oral Gel 15 Gram(s) Oral once PRN Blood Glucose LESS THAN 70 milliGRAM(s)/deciliter  melatonin 3 milliGRAM(s) Oral at bedtime PRN Insomnia  sodium chloride 0.9% lock flush 10 milliLiter(s) IV Push every 1 hour PRN Pre/post blood products, medications, blood draw, and to maintain line patency      Vital Signs Last 24 Hrs  T(F): 97.9 (05-21-24 @ 16:00), Max: 98.9 (05-21-24 @ 04:00)  HR: 56 (05-21-24 @ 20:24) (56 - 79)  BP: 125/53 (05-21-24 @ 16:00) (125/53 - 168/71)  RR: 15 (05-21-24 @ 16:00) (15 - 18)  SpO2: 98% (05-21-24 @ 20:24) (97% - 100%)  Telemetry:   CAPILLARY BLOOD GLUCOSE      POCT Blood Glucose.: 228 mg/dL (21 May 2024 17:06)  POCT Blood Glucose.: 235 mg/dL (21 May 2024 11:13)  POCT Blood Glucose.: 229 mg/dL (21 May 2024 05:20)  POCT Blood Glucose.: 181 mg/dL (20 May 2024 23:54)    I&O's Summary    20 May 2024 07:01  -  21 May 2024 07:00  --------------------------------------------------------  IN: 1072 mL / OUT: 2150 mL / NET: -1078 mL        PHYSICAL EXAM:  GENERAL: NAD, well-developed  HEAD:  Atraumatic, Normocephalic  EYES: EOMI, PERRLA, conjunctiva and sclera clear  NECK: Supple, No JVD  CHEST/LUNG: Clear to auscultation bilaterally; No wheeze  HEART: Regular rate and rhythm; No murmurs, rubs, or gallops  ABDOMEN: Soft, Nontender, Nondistended; Bowel sounds present  EXTREMITIES:  2+ Peripheral Pulses, No clubbing, cyanosis, or edema  PSYCH: AAOx3  NEUROLOGY: non-focal  SKIN: No rashes or lesions    LABS:                        7.9    10.27 )-----------( 378      ( 21 May 2024 05:25 )             23.6     05-21    133<L>  |  92<L>  |  33<H>  ----------------------------<  219<H>  3.4<L>   |  26  |  3.58<H>    Ca    8.0<L>      21 May 2024 05:25  Phos  1.5     05-21  Mg     2.40     05-21      PTT - ( 21 May 2024 05:25 )  PTT:61.5 sec      Urinalysis Basic - ( 21 May 2024 05:25 )    Color: x / Appearance: x / SG: x / pH: x  Gluc: 219 mg/dL / Ketone: x  / Bili: x / Urobili: x   Blood: x / Protein: x / Nitrite: x   Leuk Esterase: x / RBC: x / WBC x   Sq Epi: x / Non Sq Epi: x / Bacteria: x        RADIOLOGY & ADDITIONAL TESTS:    Imaging Personally Reviewed:    Consultant(s) Notes Reviewed:      Care Discussed with Consultants/Other Providers:

## 2024-05-21 NOTE — PROGRESS NOTE ADULT - SUBJECTIVE AND OBJECTIVE BOX
[facility], Division of Infectious Diseases  AUGUST Carbajal Y. Patel, S. Shah, G. Brian  [phone]  ([phone] - weekdays after 5pm and weekends)    Name: JIMMY BLAND  Age/Gender: 73y Male  MRN: [id number]    Interval History:  Notes reviewed.   No concerning overnight events.  Afebrile.   daughter at bedside    Allergies: No Known Allergies      Objective:  Vitals:   T(F): 98.9 (05-21-24 @ 04:00), Max: 98.9 (05-21-24 @ 04:00)  HR: 65 (05-21-24 @ 11:02) (55 - 79)  BP: 168/71 (05-21-24 @ 04:00) (136/74 - 170/64)  RR: 18 (05-21-24 @ 04:00) (14 - 22)  SpO2: 97% (05-21-24 @ 11:02) (95% - 100%)  Physical Examination:  General: no acute distress  HEENT: anicteric, NGT, tracheostomy, on vent  Lungs: clear to auscultation anteriorly  Heart: S1, S2, normal rate, RIJ shiley  Abdomen: Soft. ND. NT  Extremities: No LE edema.   Skin: Warm. Dry.     Laboratory Studies:  CBC:                       7.9    10.27 )-----------( 378      ( 21 May 2024 05:25 )             23.6     WBC Trend:  10.27 05-21-24 @ 05:25  9.06 05-20-24 @ 06:31  10.08 05-19-24 @ 05:50  7.35 05-18-24 @ 03:45  7.71 05-17-24 @ 22:30  10.59 05-17-24 @ 06:00  9.46 05-16-24 @ 16:55  10.87 05-16-24 @ 01:00  9.67 05-15-24 @ 01:15    CMP: 05-21    133<L>  |  92<L>  |  33<H>  ----------------------------<  219<H>  3.4<L>   |  26  |  3.58<H>    Ca    8.0<L>      21 May 2024 05:25  Phos  1.5     05-21  Mg     2.40     05-21          Vancomycin Level, Random: 21.8 ug/mL (05-21-24 @ 05:25)  Vancomycin Level, Random: 17.1 ug/mL (05-20-24 @ 06:31)  Vancomycin Level, Random: 18.6 ug/mL (05-19-24 @ 05:50)  Vancomycin Level, Random: 17.5 ug/mL (05-17-24 @ 22:30)  Vancomycin Level, Random: 23.8 ug/mL (05-17-24 @ 06:00)    Urinalysis Basic - ( 21 May 2024 05:25 )    Color: x / Appearance: x / SG: x / pH: x  Gluc: 219 mg/dL / Ketone: x  / Bili: x / Urobili: x   Blood: x / Protein: x / Nitrite: x   Leuk Esterase: x / RBC: x / WBC x   Sq Epi: x / Non Sq Epi: x / Bacteria: x      Microbiology: reviewed     Culture - Sputum (collected 05-18-24 @ 04:30)  Source: .Sputum Sputum  Gram Stain (05-18-24 @ 21:05):    Few polymorphonuclear leukocytes per low power field    No Squamous epithelial cells per low power field    No organisms seen per oil power field  Final Report (05-20-24 @ 07:02):    Normal Respiratory Jocelyn present    Culture - Blood (collected 05-18-24 @ 04:05)  Source: .Blood Blood-Venous  Preliminary Report (05-21-24 @ 11:01):    No growth at 72 Hours    Culture - Blood (collected 05-18-24 @ 03:45)  Source: .Blood Blood-Venous  Preliminary Report (05-21-24 @ 09:01):    No growth at 72 Hours    Culture - Fungal, Bronchial (collected 05-12-24 @ 15:06)  Source: .Bronchial Bronchial Lavage  Preliminary Report (05-15-24 @ 23:03):    Culture is being performed. Fungal cultures are held for 4 weeks.    Culture - Acid Fast - Bronchial w/Smear (collected 05-12-24 @ 15:06)  Source: .Bronchial Bronchial Lavage  Preliminary Report (05-15-24 @ 23:09):    Culture is being performed.    Culture - Bronchial (collected 05-12-24 @ 15:06)  Source: .Bronchial Bronchial Lavage  Gram Stain (05-12-24 @ 22:40):    Moderate polymorphonuclear leukocytes per low power field    Rare Squamous epithelial cells per low power field    Rare Gram Positive Cocci in Clusters per oil power field  Final Report (05-14-24 @ 08:10):    Normal Respiratory Jocelyn present    Culture - Blood (collected 05-11-24 @ 14:00)  Source: .Blood Blood-Peripheral  Final Report (05-16-24 @ 19:01):    No growth at 5 days    Culture - Blood (collected 05-11-24 @ 14:00)  Source: .Blood Blood-Peripheral  Final Report (05-16-24 @ 19:01):    No growth at 5 days    Culture - Sputum (collected 05-10-24 @ 16:45)  Source: .Sputum Sputum  Gram Stain (05-10-24 @ 22:57):    Rare polymorphonuclear leukocytes per low power field    Rare Squamous epithelial cells per low power field    Rare Gram Negative Rods per oil power field  Final Report (05-12-24 @ 16:49):    Normal Respiratory Jocelyn present    Culture - Blood (collected 05-10-24 @ 04:00)  Source: .Blood Blood-Peripheral  Final Report (05-15-24 @ 07:01):    No growth at 5 days    Culture - Blood (collected 05-10-24 @ 03:45)  Source: .Blood Blood-Peripheral  Gram Stain (05-10-24 @ 19:34):    Growth in anaerobic bottle: Gram Positive Rods  Final Report (05-11-24 @ 14:47):    Growth in anaerobic bottle: Bacillus cereus "Susceptibilities not    performed"    Direct identification is available within approximately 3-5    hours either by Blood Panel Multiplexed PCR or Direct    MALDI-TOF. Details: [email or web address]  Organism: Blood Culture PCR (05-11-24 @ 14:47)  Organism: Blood Culture PCR (05-11-24 @ 14:47)      Method Type: PCR      -  Blood PCR Panel: NEG    Culture - Sputum (collected 05-08-24 @ 10:05)  Source: ET Tube ET Tube  Gram Stain (05-08-24 @ 20:37):    Few polymorphonuclear leukocytes per low power field    No Squamous epithelial cells per low power field    No organisms seen per oil power field  Final Report (05-10-24 @ 11:45):    Normal Respiratory Jocelyn present        Radiology: reviewed     Medications:  acetaminophen     Tablet .. 650 milliGRAM(s) Oral every 6 hours PRN  albuterol/ipratropium for Nebulization 3 milliLiter(s) Nebulizer every 12 hours  aspirin  chewable 81 milliGRAM(s) Oral daily  atorvastatin 20 milliGRAM(s) Oral at bedtime  carvedilol 12.5 milliGRAM(s) Oral every 12 hours  chlorhexidine 0.12% Liquid 15 milliLiter(s) Oral Mucosa every 12 hours  chlorhexidine 2% Cloths 1 Application(s) Topical <User Schedule>  dextrose 10% Bolus 125 milliLiter(s) IV Bolus once  dextrose 5%. 1000 milliLiter(s) IV Continuous <Continuous>  dextrose 5%. 1000 milliLiter(s) IV Continuous <Continuous>  dextrose 50% Injectable 25 Gram(s) IV Push once  dextrose 50% Injectable 12.5 Gram(s) IV Push once  dextrose Oral Gel 15 Gram(s) Oral once PRN  epoetin reilly (EPOGEN) Injectable 60876 Unit(s) IV Push <User Schedule>  ferrous    sulfate Liquid 300 milliGRAM(s) Enteral Tube daily  glucagon  Injectable 1 milliGRAM(s) IntraMuscular once  heparin  Infusion. 1100 Unit(s)/Hr IV Continuous <Continuous>  hydrALAZINE 100 milliGRAM(s) Oral three times a day  insulin lispro (ADMELOG) corrective regimen sliding scale   SubCutaneous every 6 hours  insulin NPH human recombinant 4 Unit(s) SubCutaneous every 6 hours  melatonin 3 milliGRAM(s) Oral at bedtime PRN  piperacillin/tazobactam IVPB.. 3.375 Gram(s) IV Intermittent every 12 hours  sodium bicarbonate 650 milliGRAM(s) Oral every 8 hours  sodium chloride 0.9% lock flush 10 milliLiter(s) IV Push every 1 hour PRN  sodium chloride 3%  Inhalation 4 milliLiter(s) Inhalation every 12 hours    Antimicrobials:  piperacillin/tazobactam IVPB.. 3.375 Gram(s) IV Intermittent every 12 hours

## 2024-05-21 NOTE — PROGRESS NOTE ADULT - ASSESSMENT
74 YO M with PMHx of ESRD on HD MWF, HTN, and 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 Baclofen started and course complicated by AMS second to baclofen toxicity requiring intubation and MICU transfer. Course further complicated by LEFT talya and cerebellum stroke, R AVF stenosis, aspiration and B Cereus bacteremia and RLE DVT. s/p trach and transferred to RCU 5/16    NEUROLOGY  # AMS   - MRI HEAD (5/6) with punctate foci in the left talya and left cerebellum with concern for acute infarct.   - MRA HEAD and NECK (5/6) with no large vessel occlusion or stenosis.  - Continue on aspirin and hold DAPT for now pending procedures   - Continue on lipitor for medical management   - Supportive care     CARDIOVASCULAR  # CP with NSTEMI vs demand ischemia with HTN   - Admitted for CP and HTN with peaked T WAVES and ECG with ST deviation on admission   - Troponins elevated on admission with negative CKMB   - TTE (4/30) with EF 72, normal LVRVSF, and no regional wall motion abnormalities   - Case discussed with cardiology and no acute need for cath. DAPT loaded on 5/4 and plan for medical management with ASA, lipitor and heparin GTT.     # Septic vs vasoplegic shock  # Hx of HTN   - Home BP medications held and pressors weaned off   - Continue on coreg 12.5 and hydral 100   - Monitor BP with goal 150/90s    RESPIRATORY  # Respiratory failure   - AMS noted with baclofen toxicity requiring intubation   - Attempted extubation in MICU however course complicated by aspiration and prolonged course and now s/p tracheostomy with IP on 5/14  - Continue on vent 12/500/5/30 and able to tolerate SBT 10/5 x several hours but limited by weakness   - Continue on nebs and HTS Q12H for now     GI  # Dysphagia   - s/p surgical PEG 5/17   - Continue on tube feeds via PEG     RENAL  # ESRD on HD MWF with R BCF  # Fistula stenosis ?  - VA AVF (5/2) with Right radiocephalic arteriovenous fistula has no hemodynamically significant stenosis present at the anastomotic site ( cm/sec, EDV 49 cm/sec). The usable segment is partially thrombosed with minimal patency.  - Required R FEMORAL line on medicine, then removed on 5/2, and replaced with R IJ SHILEY on 5/3 for CRRT then converted back to iHD MWF   - R SHILEY removed on 5/10 second to bacteremia and replaced on 5/13   - Continue on HD MWF per HD   - Continue on renvela 1600   - Continue on HCO3 650 tabs   - Plan for fistulogram next week with vascular    INFECTIOUS DISEASE  # Aspiration   - Recurrent fever spike 5/17 overnight and CXR with RLL opacity   - BCx and SCx negative, however fever spikes improved on ABX   - Zosyn continued empirically (5/18 - )     # B Cereus Bacteremia   - Course complicated by fevers and aspiration event   - MRSA (5/1) negative   - BCx (5/2) negative   - SCx (5/4) and BAL (5/12) negative   - BCx (5/10) with bacillus cereus and cleared on (5/12).   - Case discussed with ID and plans to continue on vanco through 5/21 x 10 day course. Dose per level.     HEME  # AOCD with ESRD   - Anemia panel with AOCD and low # sat   - Transfused on 5/16   - EPO 10K continued on TIW   - Ferrous supplement added   - Monitor HH     VASCULAR   # RLE DVT   - CT AP (5/12) with nonocclusive RIGHT common iliac vein deep venous thrombosis  - Continue on heparin GTT   - RLE precautions     ENDOCRINE  # IDDM2 A1C 6.9  - Continue on NPH 4U and ISS   - Monitor and adjust insulin based on FS     SKIN  - R FEMORAL (5/1-5/2)   - R IJ SHILEY (5/3-5/10)   - R IJ SHILEY (5/13 - )     ETHICS/ GOC    - FULL CODE     DISPO - Vent facility

## 2024-05-21 NOTE — PROGRESS NOTE ADULT - SUBJECTIVE AND OBJECTIVE BOX
Cardiovascular Disease Progress Note  Date of service: 24 @ 13:10    Overnight events: No acute events overnight.  Patient is in no distress. Wife at bedside.   Otherwise review of systems negative    Objective Findings:  T(C): 37.2 (24 @ 04:00), Max: 37.2 (24 @ 04:00)  HR: 65 (24 @ 11:02) (55 - 79)  BP: 168/71 (24 @ 04:00) (136/74 - 170/64)  RR: 18 (24 @ 04:00) (14 - 22)  SpO2: 97% (24 @ 11:02) (95% - 100%)  Wt(kg): --  Daily     Daily Weight in k.2 (20 May 2024 21:15)      Physical Exam:  Gen: NAD; Patient resting comfortably  HEENT: EOMI, Normocephalic/ atraumatic  CV: RRR, normal S1 + S2, no m/r/g  Lungs:  Trach  Abd: soft, non-tender; bowel sounds present  Ext: No edema; warm and well perfused    Telemetry: Sinus     Laboratory Data:                        7.9    10.27 )-----------( 378      ( 21 May 2024 05:25 )             23.6         133<L>  |  92<L>  |  33<H>  ----------------------------<  219<H>  3.4<L>   |  26  |  3.58<H>    Ca    8.0<L>      21 May 2024 05:25  Phos  1.5       Mg     2.40           PTT - ( 21 May 2024 05:25 )  PTT:61.5 sec          Inpatient Medications:  MEDICATIONS  (STANDING):  albuterol/ipratropium for Nebulization 3 milliLiter(s) Nebulizer every 12 hours  aspirin  chewable 81 milliGRAM(s) Oral daily  atorvastatin 20 milliGRAM(s) Oral at bedtime  carvedilol 12.5 milliGRAM(s) Oral every 12 hours  chlorhexidine 0.12% Liquid 15 milliLiter(s) Oral Mucosa every 12 hours  chlorhexidine 2% Cloths 1 Application(s) Topical <User Schedule>  dextrose 10% Bolus 125 milliLiter(s) IV Bolus once  dextrose 5%. 1000 milliLiter(s) (50 mL/Hr) IV Continuous <Continuous>  dextrose 5%. 1000 milliLiter(s) (100 mL/Hr) IV Continuous <Continuous>  dextrose 50% Injectable 12.5 Gram(s) IV Push once  dextrose 50% Injectable 25 Gram(s) IV Push once  epoetin reilly (EPOGEN) Injectable 84622 Unit(s) IV Push <User Schedule>  ferrous    sulfate Liquid 300 milliGRAM(s) Enteral Tube daily  glucagon  Injectable 1 milliGRAM(s) IntraMuscular once  heparin  Infusion. 1100 Unit(s)/Hr (11 mL/Hr) IV Continuous <Continuous>  hydrALAZINE 100 milliGRAM(s) Oral three times a day  insulin lispro (ADMELOG) corrective regimen sliding scale   SubCutaneous every 6 hours  insulin NPH human recombinant 4 Unit(s) SubCutaneous every 6 hours  piperacillin/tazobactam IVPB.. 3.375 Gram(s) IV Intermittent every 12 hours  sodium bicarbonate 650 milliGRAM(s) Oral every 8 hours  sodium chloride 3%  Inhalation 4 milliLiter(s) Inhalation every 12 hours      Assessment:  73 year old man with HTN, HLD, T2DM on insulin, and ESRD on HD presents with supply demand ischemia and angina.    Plan of Care:    #Type II myocardial infarction-  Secondary to distributive shock earlier on admission.   The repeat echo shows no new wall motion abnormality.   I would not pursue cath at this time given recurrent aspiration and chronic respiratory failure s/p trach .   The patient is optimized from a cardiac standpoint to proceed with fistulogram.        #Sinus bradycardia-  No evidence of AV block on admission EKG or telemetry.  No indication for pacing at this time.   - Continue Coreg with holding parameters.     #HTN-  BP labile.  Monitor trends.     #ESRD-  Defer to renal     #DVT   - R common Iliac DVT  - Hep GTT  - AC management as per vascular team.        Over 55 minutes spent on total encounter; more than 50% of the visit was spent counseling and/or coordinating care by the attending physician.      Tico Bravo DO [facility]  Cardiovascular Disease  [phone]

## 2024-05-21 NOTE — PROGRESS NOTE ADULT - ASSESSMENT
73-year-old male past medical history hypertension, ESRD on dialysis Monday Wednesday Friday, diabetes insulin-dependent presents with hiccups patient endorses persistent hiccups for the last 2 days. Nephrology consulted for HD needs.    A/P  ESRD:  Center: [facility]  Nephrologist: Dr. Hardwick  Access: R AVF  MWF schedule  Vascular for fistulogram   s/p femoral shiley on 5/1, now removed  s/p placement of IJ shiley 5/3  Stopped CRRT   Restarted IHD  s/p HD 5/7 UF 1778; treatment terminated early due to hypotension   S/P MRA head/neck w/ gadolinium --> Received HD on 5/9 and 5/10.  5/10 Will need to dialyze 3 days consecutively--> plan for HD on 5/11 5/11 shiley removed due to bacteremia; did not receive HD.  Line holiday  5/12 - BUN worsened; K up trending.    5/13 Shiley placed.  5/17: s/p PEG placement.   5/21 IR consulted for shiley exchange; IR recommended to keep current shiley in place, with pending fistulogram   Planned for fistulogram on 5/24 + swelling of RUE.-follow up vascular  continue HD MWF   Consent obtained and placed in ED chart  Renal diet  Monitor BMP  consider [facility] for rehab when his outpatient Nephro-Dr. Hardwick can follow him    Hyperkalemia/Hypokalemia  Improved w/ HD.  C/W HD schedule as above.  Lokelma 10gm PRN for K >5.3 on non-HD days  PhosNaK given 5/21  Low K diet.  Monitor closely     HTN:  BP fluctuating.  On carvedilol 12.5mg BID, hydralazine 100mg TID, nifedipine 60mg qd.  UF w/ HD.  Monitor BP.    Anemia:  + iron deficiency.  Tsat 13% - on ferrous sulfate 300mg qd via PEG.  Will hold venofer for now in view of up trending leukocytosis.  SILVA w/ HD.  Tranfuse for Hgb <7.  Monitor Hb.    CKD-MBD  Check PTH  PO4 low   s/p PhosNaK 5/21  Sevelamer 1600mg TID d/c'ed today  Low PO4 diet.  Monitor Ca, PO4 daily    Hypocalcemia:  In setting of CKD vs. hypoalbuminemia.  Optimize albumin.  Corrected Ca WNL.  Improving.  Monitor Ca.   73-year-old male past medical history hypertension, ESRD on dialysis Monday Wednesday Friday, diabetes insulin-dependent presents with hiccups patient endorses persistent hiccups for the last 2 days. Nephrology consulted for HD needs.    A/P  ESRD:  Center: [facility]  Nephrologist: Dr. Hardwick  Access: R AVF  MWF schedule  Vascular for fistulogram   s/p femoral shiley on 5/1, now removed  s/p placement of IJ shiley 5/3  Stopped CRRT   Restarted IHD  s/p HD 5/7 UF 1778; treatment terminated early due to hypotension   S/P MRA head/neck w/ gadolinium --> Received HD on 5/9 and 5/10.  5/10 Will need to dialyze 3 days consecutively--> plan for HD on 5/11 5/11 shiley removed due to bacteremia; did not receive HD.  Line holiday  5/12 - BUN worsened; K up trending.    5/13 Shiley placed.  5/17: s/p PEG placement.   5/21 IR consulted for shiley exchange; IR recommended to keep current shiley in place, with pending fistulogram   Planned for fistulogram on 5/24 + swelling of RUE.-follow up vascular  continue HD MWF   Consent obtained and placed in ED chart  Renal diet  Monitor [facility]  consider [facility] for rehab where his outpatient Nephro-Dr. Hardwick can follow him    Hyperkalemia/Hypokalemia  Improved w/ HD.  C/W HD schedule as above.  Lokelma 10gm PRN for K >5.3 on non-HD days  PhosNaK given 5/21  Low K diet.  Monitor closely     HTN:  BP fluctuating.  On carvedilol 12.5mg BID, hydralazine 100mg TID, nifedipine 60mg qd.  UF w/ HD.  Monitor BP.    Anemia:  + iron deficiency.  Tsat 13% - on ferrous sulfate 300mg qd via PEG.  Will hold venofer for now in view of up trending leukocytosis.  SILVA w/ HD.  Tranfuse for Hgb <7.  Monitor Hb.    CKD-MBD  Check PTH  PO4 low   s/p PhosNaK 5/21  Sevelamer 1600mg TID d/c'ed today  Low PO4 diet.  Monitor Ca, PO4 daily    Hypocalcemia:  In setting of CKD vs. hypoalbuminemia.  Optimize albumin.  Corrected Ca WNL.  Improving.  Monitor Ca.

## 2024-05-21 NOTE — PROGRESS NOTE ADULT - ASSESSMENT
72 y/o M PMhx HTN, ESRD (on HD M/W/F), DM who presented with hiccups x 2 days and chest pain found to have peaked T waves. Hospital course c/b AMS s/p RRT on 5/1 and 5/2. Vascular consulted 2/2 RUE AVF access unable to be used s/p R femoral shiley placed for emergent HD. Transferred to MICU and intubated 5/2 for airway protection.   received empiric course of zosyn x 5 days, completed 5/7 and meropenem x 5 days, completed 5/14    PNA, fever  febrile 5/18  CXR- Hazy opacity in the right lower lung field    B. cereus bacteremia  blood cx 5/10 w/ Bacillus cereus in 1/4 bottles  repeat blood cultures 5/11- NGTD   s/p vanc x 10 days, completed 5/20    AMS, CVA  TTE- no evidence of valvular disease  s/p LP 5/2, viral encephalitis/ meningitis ruled out  MRI- Punctate foci of restricted diffusion in the left talya and left cerebellum with associated T2 prolongation consistent with acute infarcts  s/p trach 5/14, s/p PEG 5/17    DVT  CT- Nonocclusive R common iliac vein deep venous thrombosis.    Recommendations  c/w zosyn for PNA  complete 5 days w/ last day 5/23    Steven Torres M.D.  [facility], Division of Infectious Diseases  [phone]  After 5pm on weekdays and all day on weekends - please call [phone]

## 2024-05-21 NOTE — PROGRESS NOTE ADULT - SUBJECTIVE AND OBJECTIVE BOX
Neurology Progress Note    S: Patient seen and examined    Medications: MEDICATIONS  (STANDING):  albuterol/ipratropium for Nebulization 3 milliLiter(s) Nebulizer every 12 hours  aspirin  chewable 81 milliGRAM(s) Oral daily  atorvastatin 20 milliGRAM(s) Oral at bedtime  carvedilol 12.5 milliGRAM(s) Oral every 12 hours  chlorhexidine 0.12% Liquid 15 milliLiter(s) Oral Mucosa every 12 hours  chlorhexidine 2% Cloths 1 Application(s) Topical <User Schedule>  dextrose 10% Bolus 125 milliLiter(s) IV Bolus once  dextrose 5%. 1000 milliLiter(s) (100 mL/Hr) IV Continuous <Continuous>  dextrose 5%. 1000 milliLiter(s) (50 mL/Hr) IV Continuous <Continuous>  dextrose 50% Injectable 12.5 Gram(s) IV Push once  dextrose 50% Injectable 25 Gram(s) IV Push once  epoetin reilly (EPOGEN) Injectable 65218 Unit(s) IV Push <User Schedule>  ferrous    sulfate Liquid 300 milliGRAM(s) Enteral Tube daily  glucagon  Injectable 1 milliGRAM(s) IntraMuscular once  heparin  Infusion. 1100 Unit(s)/Hr (11 mL/Hr) IV Continuous <Continuous>  hydrALAZINE 100 milliGRAM(s) Oral three times a day  insulin lispro (ADMELOG) corrective regimen sliding scale   SubCutaneous every 6 hours  insulin NPH human recombinant 4 Unit(s) SubCutaneous every 6 hours  piperacillin/tazobactam IVPB.. 3.375 Gram(s) IV Intermittent every 12 hours  sodium bicarbonate 650 milliGRAM(s) Oral every 8 hours  sodium chloride 3%  Inhalation 4 milliLiter(s) Inhalation every 12 hours    MEDICATIONS  (PRN):  acetaminophen   Oral Liquid .. 650 milliGRAM(s) Oral every 6 hours PRN Temp greater or equal to 38C (100.4F), Moderate Pain (4 - 6)  dextrose Oral Gel 15 Gram(s) Oral once PRN Blood Glucose LESS THAN 70 milliGRAM(s)/deciliter  melatonin 3 milliGRAM(s) Oral at bedtime PRN Insomnia  sodium chloride 0.9% lock flush 10 milliLiter(s) IV Push every 1 hour PRN Pre/post blood products, medications, blood draw, and to maintain line patency       Vitals:  Vital Signs Last 24 Hrs  T(C): 36.6 (21 May 2024 16:00), Max: 37.2 (21 May 2024 04:00)  T(F): 97.9 (21 May 2024 16:00), Max: 98.9 (21 May 2024 04:00)  HR: 56 (21 May 2024 20:24) (56 - 79)  BP: 125/53 (21 May 2024 16:00) (125/53 - 170/64)  BP(mean): --  RR: 15 (21 May 2024 16:00) (14 - 22)  SpO2: 98% (21 May 2024 20:24) (95% - 100%)    Parameters below as of 21 May 2024 20:24  Patient On (Oxygen Delivery Method): ventilator                General Exam:   General Appearance: + trach  Head: Normocephalic, atraumatic and no dysmorphic features  Ear, Nose, and Throat: Moist mucous membranes  CVS: S1S2+  Resp: mechanical  Abd: soft NTND  Extremities: No edema, no cyanosis  Skin: No bruises, no rashes     Neurological Exam:  Mental Status: Opens to voice, tracks, follows simple commands. Mouths words  Cranial Nerves: pupils 1-2mm, R facial palsy with smile  Motor: normal tone, RUE and RLE drift.  Sensation:  intact      I personally reviewed the below data/images/labs:      LABS:                          7.9    10.27 )-----------( 378      ( 21 May 2024 05:25 )             23.6     05-21    133<L>  |  92<L>  |  33<H>  ----------------------------<  219<H>  3.4<L>   |  26  |  3.58<H>    Ca    8.0<L>      21 May 2024 05:25  Phos  1.5     05-21  Mg     2.40     05-21        PTT - ( 21 May 2024 05:25 )  PTT:61.5 sec  Urinalysis Basic - ( 21 May 2024 05:25 )    Color: x / Appearance: x / SG: x / pH: x  Gluc: 219 mg/dL / Ketone: x  / Bili: x / Urobili: x   Blood: x / Protein: x / Nitrite: x   Leuk Esterase: x / RBC: x / WBC x   Sq Epi: x / Non Sq Epi: x / Bacteria: x            CT Head No Cont:  (01 May 2024 18:11)  < from: CT Head No Cont (05.01.24 @ 18:11) >    ACC: [id number] EXAM:  CT BRAIN   ORDERED BY: SHELBY MOREL     PROCEDURE DATE:  05/01/2024          INTERPRETATION:  CT OF THE HEAD WITHOUT CONTRAST    CLINICAL INDICATION: Lethargy.    TECHNIQUE: Volumetric CT acquisition was performed through the brain and   reviewed using brain and bone window technique.      COMPARISON: CT head from 1/17/2024    FINDINGS:    The ventricular and sulcal size and configuration is age appropriate.     There is no acute loss of gray-white differentiation. Stable bilateral   inferior frontal encephalomalacia and gliosis likely related to remote   trauma.    There is no evidence of mass effect, midline shift, acute intracranial   hemorrhage, or extra-axial collections.     The calvarium is intact. The paranasalsinuses are clear.The mastoid air   cells are predominantly clear. The orbits are unremarkable.      IMPRESSION:  No acute intracranial hemorrhage or acute territorial infarction. Chronic   findings as above.    --- End of Report ---          GILDARDO KHAN; Resident Radiologist  This document has been electronically signed.  LADARIUS DENNY MD; Attending Radiologist  This document has been electronically signed. May  1 2024  7:54PM    < end of copied text >      EEG Classification / Summary:  Abnormal EEG in the awake, drowsy states.   -Generalized sharp waves with triphasic morphology, initially appearing as frequent GPDs at 1-1.5 Hz, decreasing in prevalence later in recording.  -Variable moderate to mild diffuse slowing.  -No electrographic seizures are captured.     Clinical Impression:  -Generalized sharp waves with triphasic morphology can be seen in toxic-metabolic encephalopathies and indicate some risk of generalized seizures, especially when at higher frequencies than in this study. These decrease in prevalence over the course of recording.  -Variable moderate to mild diffuse cerebral dysfunction is nonspecific in etiology.   -No seizures x2 days of recording.    m< from: MR Head w/wo IV Cont (05.06.24 @ 18:10) >    ACC: [id number] EXAM:  MR BRAIN WAW IC   ORDERED BY: DOLORES QUICK     PROCEDURE DATE:  05/06/2024          INTERPRETATION:  CLINICAL INDICATION: Altered mental status.    TECHNIQUE: Multi-planar, multi-sequence magnetic resonance imaging of the   brain was performed with and without intravenous contrast.    CONTRAST: Post-contrast MR images were obtained following infusion of 5   mL of Gadavist    COMPARISON: No prior studies are available for comparison    FINDINGS:    VENTRICLES AND SULCI:  Normal.  INTRA-AXIAL: Punctate foci of restricted diffusion in the left talya and   left cerebellum with associated T2 prolongation consistent with acute   infarcts. No acute intracranial hemorrhage. Encephalomalacia/gliosis   noted in the inferior frontal lobes. No abnormal enhancement. There are   patchy and confluent foci of hyperintense T2 signal within the   subcortical and periventricular white matter which are nonspecific but   likely related to chronic microvascular ischemic disease.  EXTRA-AXIAL:  No mass or collection.  VISUALIZED SINUSES: Mild polypoid mucosal thickening. Fluid noted in the   nasopharynx.  VISUALIZED MASTOIDS:  Clear.  CALVARIUM:  Normal.  CAROTID FLOW VOIDS: Normal.  MISCELLANEOUS:  None.    IMPRESSION: PUNCTATE FOCI OF RESTRICTED DIFFUSION IN THE LEFT TALYA AND   LEFT CEREBELLUM WITH ASSOCIATED T2 PROLONGATION CONSISTENT WITH ACUTE   INFARCTS. NO ACUTE INTRACRANIAL HEMORRHAGE. NO AREA OF ABNORMAL   ENHANCEMENT.    < end of copied text >    m< from: MR Angio Head No Cont (05.09.24 @ 15:58) >    ACC: [id number] EXAM:  MR ANGIO NECK WAW IC   ORDERED BY: OMAR NESBITT     ACC: [id number] EXAM:  MR ANGIO BRAIN   ORDERED BY: OMAR NESBITT     PROCEDURE DATE:  05/09/2024          INTERPRETATION:  .    CLINICAL INFORMATION: Stroke workup. Talya/cerebellar stroke.    TECHNIQUE: MRA images through the neck and [place] of [place] were obtained   using a combination of 2-D and 3-D time-of-flight acquisition. Post   contrast MR angiography of the neck was also performed. The data was then   reformatted intoa volumetric data set and reviewed as rotational MIP   images. 5 cc's of IV Gadavist was administered without immediate   complication. 2.5 cc's was discarded.    COMPARISON: Prior head CT exam dated 5/1/2024.    FINDINGS:    MRA Neck: There is a standard anatomic configuration to the aortic arch.    The origins of the great vessels appear unremarkable. The bilateral   common carotid arteries and carotid bifurcations appear unremarkable.    The bilateral cervical internal carotid arteries are within normal limits.    The origins of the bilateral vertebral arteries are normal. The bilateral   cervical vertebral arteries are normal in course and caliber.    MRA [place] of [place]: The bilateral intracranial internal carotid,   anterior, and middle cerebral arteries appear unremarkable.    The anterior and bilateral posterior communicating arteries are notable.    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 Otherwise, the   bilateral intradural vertebral arteries, vertebrobasilar junction,   basilar artery, and basilar tip appear unremarkable as well as the   bilateral posterior cerebral arteries.    IMPRESSION: No large vessel occlusion or stenosis.    < end of copied text >

## 2024-05-21 NOTE — PROGRESS NOTE ADULT - ASSESSMENT
73M w/ PMHx hypertension, ESRD on HD via R radiocephalic fistula (MWF), DM, HTN, p/w hiccups and peaked T waves, admitted to MICU for c/f baclofen toxicity. Patient received 1x HD on admission. R femoral shiley removed 5/2, had 2 RRT for AMS and failed HD via AVF 5/3. S/p emergent R IJ shiley placement 5/3, started CRRT which is now transitioned back to iHD. Vascular planning RUE fistulogram when medically optimized. Extubated to HFNC then reintubated 5/12 for c/f aspiration w/ hypoxic resp failure. S/p trach 5/14. Downgraded from MICU to RCU 5/16.    Recommendations:   - On hep gtt for nonocclusive R common iliac DVT  - Fistulogram planning for Friday with Dr. Lockett  - Consent signed by wife, in chart  - Med/cards/ID optimization, please make sure risk stratification documented  - Continue HD via replaced R IJ shiley  - Global care per primary     Vascular Surgery  [id number]

## 2024-05-21 NOTE — PROGRESS NOTE ADULT - SUBJECTIVE AND OBJECTIVE BOX
TEAM [ *C* ] Surgery Daily Progress Note  =====================================================    SUBJECTIVE: Patient seen and examined at bedside on AM rounds.  Patient awake and alert.       ALLERGIES:  No Known Allergies      --------------------------------------------------------------------------------------    MEDICATIONS:    Neurologic Medications  acetaminophen     Tablet .. 650 milliGRAM(s) Oral every 6 hours PRN Temp greater or equal to 38C (100.4F), Mild Pain (1 - 3)  melatonin 3 milliGRAM(s) Oral at bedtime PRN Insomnia    Respiratory Medications  albuterol/ipratropium for Nebulization 3 milliLiter(s) Nebulizer every 12 hours  sodium chloride 3%  Inhalation 4 milliLiter(s) Inhalation every 12 hours    Cardiovascular Medications  carvedilol 12.5 milliGRAM(s) Oral every 12 hours  hydrALAZINE 100 milliGRAM(s) Oral three times a day    Gastrointestinal Medications  dextrose 10% Bolus 125 milliLiter(s) IV Bolus once  dextrose 5%. 1000 milliLiter(s) IV Continuous <Continuous>  dextrose 5%. 1000 milliLiter(s) IV Continuous <Continuous>  ferrous    sulfate Liquid 300 milliGRAM(s) Enteral Tube daily  sodium bicarbonate 650 milliGRAM(s) Oral every 8 hours  sodium chloride 0.9% lock flush 10 milliLiter(s) IV Push every 1 hour PRN Pre/post blood products, medications, blood draw, and to maintain line patency    Genitourinary Medications    Hematologic/Oncologic Medications  aspirin  chewable 81 milliGRAM(s) Oral daily  epoetin reilly (EPOGEN) Injectable 88610 Unit(s) IV Push <User Schedule>  heparin  Infusion. 1100 Unit(s)/Hr IV Continuous <Continuous>    Antimicrobial/Immunologic Medications  piperacillin/tazobactam IVPB.. 3.375 Gram(s) IV Intermittent every 12 hours    Endocrine/Metabolic Medications  atorvastatin 20 milliGRAM(s) Oral at bedtime  dextrose 50% Injectable 25 Gram(s) IV Push once  dextrose 50% Injectable 12.5 Gram(s) IV Push once  dextrose Oral Gel 15 Gram(s) Oral once PRN Blood Glucose LESS THAN 70 milliGRAM(s)/deciliter  glucagon  Injectable 1 milliGRAM(s) IntraMuscular once  insulin lispro (ADMELOG) corrective regimen sliding scale   SubCutaneous every 6 hours  insulin NPH human recombinant 4 Unit(s) SubCutaneous every 6 hours    Topical/Other Medications  chlorhexidine 0.12% Liquid 15 milliLiter(s) Oral Mucosa every 12 hours  chlorhexidine 2% Cloths 1 Application(s) Topical <User Schedule>    --------------------------------------------------------------------------------------    VITAL SIGNS:  T(C): 37.2 (05-21-24 @ 04:00), Max: 37.2 (05-20-24 @ 13:00)  HR: 65 (05-21-24 @ 11:02) (55 - 79)  BP: 168/71 (05-21-24 @ 04:00) (114/68 - 170/64)  RR: 18 (05-21-24 @ 04:00) (14 - 22)  SpO2: 97% (05-21-24 @ 11:02) (95% - 100%)  --------------------------------------------------------------------------------------    INS AND OUTS:    05-20-24 @ 07:01  -  05-21-24 @ 07:00  --------------------------------------------------------  IN: 1072 mL / OUT: 2150 mL / NET: -1078 mL      --------------------------------------------------------------------------------------      EXAM    Physical Examination:  General: awake and alert  Respiratory: s/p trach, on vent  Cardio: regular rate  Vascular: extremities are warm and well perfused, Right AVF w/ palpable thrill, palpable b/l radial pulses,  Right IJ shiley in place    --------------------------------------------------------------------------------------    LABS                          7.9    10.27 )-----------( 378      ( 21 May 2024 05:25 )             23.6     05-21    133<L>  |  92<L>  |  33<H>  ----------------------------<  219<H>  3.4<L>   |  26  |  3.58<H>    Ca    8.0<L>      21 May 2024 05:25  Phos  1.5     05-21  Mg     2.40     05-21      PTT - ( 21 May 2024 05:25 )  PTT:61.5 sec  Urinalysis Basic - ( 21 May 2024 05:25 )    Color: x / Appearance: x / SG: x / pH: x  Gluc: 219 mg/dL / Ketone: x  / Bili: x / Urobili: x   Blood: x / Protein: x / Nitrite: x   Leuk Esterase: x / RBC: x / WBC x   Sq Epi: x / Non Sq Epi: x / Bacteria: x      No Known Allergies    T(C): 37.2 (05-21-24 @ 04:00), Max: 37.2 (05-20-24 @ 13:00)  HR: 65 (05-21-24 @ 11:02) (55 - 79)  BP: 168/71 (05-21-24 @ 04:00) (114/68 - 170/64)  RR: 18 (05-21-24 @ 04:00) (14 - 22)  SpO2: 97% (05-21-24 @ 11:02) (95% - 100%)    05-20-24 @ 07:01  -  05-21-24 @ 07:00  --------------------------------------------------------  IN: 1072 mL / OUT: 2150 mL / NET: -1078 mL

## 2024-05-21 NOTE — PROGRESS NOTE ADULT - NS ATTEND AMEND GEN_ALL_CORE FT
Patient is a 72 yo M w/ ESRD on dialysis, HTN and T2DM who was admitted with demand ischemia versus NSTEMI.  Hospital course was complicated by baclofen toxicity requiring intubation and MICU transfer, left talya and cerebellum stroke, aspiration and B cereus bacteremia and RLE DVT. Now s/p Trach 5/16    #Respiratory failure  #Trach dependence  #DVT  #ESRD  - c/w heparin gtt  - c/w mechanical ventilatory support, tolerating PSV 10/5 today  - Completed course of Vanc for B cereus bacteremia  - Complete empiric course of Zosyn x 7 days for recent fever  - Awaiting fistulogram per vascular, planned for Friday. Patient is medically optimized for procedure.  - HD as per Renal    Kody Rivas MD  Pulmonary & Critical Care

## 2024-05-21 NOTE — CHART NOTE - NSCHARTNOTEFT_GEN_A_CORE
Pt seen for SEVERE MALNUTRITION FOLLOW UP     Medical Course:  74 y/o male with PMHx of ESRD on HD MWF, HTN, and 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 Baclofen started and course complicated by AMS second to baclofen toxicity requiring intubation and MICU transfer. Course further complicated by LEFT talya and cerebellum stroke, R AVF stenosis, aspiration and B Cereus bacteremia and RLE DVT. s/p trach and transferred to RCU 5/16      Nutrition Course:   Unable to conduct nutrition interview 2/2 decreased cognition. Information obtained from comprehensive chart review and per RN today: No recent episodes of nausea, vomiting, diarrhea, or constipation. Last BM noted 5/21 per RN today. (x2 loose BMs). Will recommend Banatrol 1x/day to aide in normal BMs and promote gut health.     Pt remains NPO TF only as sole source of nutrition and hydration; Nepro @ 45ml/hr x24hrs (52kg) (1080ml total vol, 1944 kcal (37kcal/kg), 87 gm protein (1.7gm/kg), 788ml free water from formula). No TF intolerances noted per RN today.       Diet Prescription: Diet, NPO with Tube Feed:   Tube Feeding Modality: Gastrostomy  Nepro with Carb Steady (NEPRORTH)  Total Volume for 24 Hours (mL): 1080  Continuous  Starting Tube Feed Rate {mL per Hour}: 20  Increase Tube Feed Rate by (mL): 10     Every 4 hours  Until Goal Tube Feed Rate (mL per Hour): 45  Tube Feed Duration (in Hours): 24  Tube Feed Start Time: 19:30 (05-17-24 @ 15:35)    Pertinent Medications: MEDICATIONS  (STANDING):  albuterol/ipratropium for Nebulization 3 milliLiter(s) Nebulizer every 12 hours  aspirin  chewable 81 milliGRAM(s) Oral daily  atorvastatin 20 milliGRAM(s) Oral at bedtime  carvedilol 12.5 milliGRAM(s) Oral every 12 hours  chlorhexidine 0.12% Liquid 15 milliLiter(s) Oral Mucosa every 12 hours  chlorhexidine 2% Cloths 1 Application(s) Topical <User Schedule>  dextrose 10% Bolus 125 milliLiter(s) IV Bolus once  dextrose 5%. 1000 milliLiter(s) (50 mL/Hr) IV Continuous <Continuous>  dextrose 5%. 1000 milliLiter(s) (100 mL/Hr) IV Continuous <Continuous>  dextrose 50% Injectable 25 Gram(s) IV Push once  dextrose 50% Injectable 12.5 Gram(s) IV Push once  epoetin reilly (EPOGEN) Injectable 00174 Unit(s) IV Push <User Schedule>  ferrous    sulfate Liquid 300 milliGRAM(s) Enteral Tube daily  glucagon  Injectable 1 milliGRAM(s) IntraMuscular once  heparin  Infusion. 1100 Unit(s)/Hr (11 mL/Hr) IV Continuous <Continuous>  hydrALAZINE 100 milliGRAM(s) Oral three times a day  insulin lispro (ADMELOG) corrective regimen sliding scale   SubCutaneous every 6 hours  insulin NPH human recombinant 4 Unit(s) SubCutaneous every 6 hours  piperacillin/tazobactam IVPB.. 3.375 Gram(s) IV Intermittent every 12 hours  sodium bicarbonate 650 milliGRAM(s) Oral every 8 hours  sodium chloride 3%  Inhalation 4 milliLiter(s) Inhalation every 12 hours    MEDICATIONS  (PRN):  acetaminophen     Tablet .. 650 milliGRAM(s) Oral every 6 hours PRN Temp greater or equal to 38C (100.4F), Mild Pain (1 - 3)  dextrose Oral Gel 15 Gram(s) Oral once PRN Blood Glucose LESS THAN 70 milliGRAM(s)/deciliter  melatonin 3 milliGRAM(s) Oral at bedtime PRN Insomnia  sodium chloride 0.9% lock flush 10 milliLiter(s) IV Push every 1 hour PRN Pre/post blood products, medications, blood draw, and to maintain line patency    Pertinent Labs: 05-21 Na133 mmol/L<L> Glu 219 mg/dL<H> K+ 3.4 mmol/L<L> Cr  3.58 mg/dL<H> BUN 33 mg/dL<H> 05-21 Phos 1.5 mg/dL<L> 05-17 Alb 2.8 g/dL<L> 05-17 Chol 86 mg/dL LDL --    HDL 27 mg/dL<L> Trig 151 mg/dL<H>    POCT Blood Glucose.: 235 mg/dL (21 May 2024 11:13)  POCT Blood Glucose.: 229 mg/dL (21 May 2024 05:20)  POCT Blood Glucose.: 181 mg/dL (20 May 2024 23:54)  POCT Blood Glucose.: 208 mg/dL (20 May 2024 17:18)      Weight: Height (cm): 172.7 (04-29 @ 23:49)  Weight (kg):  51.8 (05-20), 52.617 (04-29 @ 23:49)  BMI (kg/m2): 17.6 (04-29 @ 23:49)  Weight Assessment: Pt with non-significant wt loss x ~3 weeks (-1.4%)       Physical Assessment, per flowsheets:  Edema: none   Skin: intact     Estimated Needs:   [X] No change since previous assessment    Previous Nutrition Diagnosis: [ x] Malnutrition   Nutrition Diagnosis is [x ] ongoing  [ ] resolved [ ] not applicable   New Nutrition Diagnosis: [x ] not applicable     Education:  [  ] Given today        Type of education provided:   [  ] Given on previous assessment by RD   [ x ] Not applicable 2/2 cognitive deficit  [  ] Pt refusal of education offered   [  ] Not applicable 2/2 current prognosis  [  ] Not warranted at present    Interventions:   1) Continue on current TF regimen: Nepro @ 45ml/hr x24hrs  2) Recommend Banatrol 1x/day to aide in normal BMs and promote gut health  3) Consider d/c Sevelamer as Pt phos noted to be low on most recent labs.  4) Continue on Ferrous sulfate supplementation for mineral provisions  5) RD to f/u prn     Monitor & Evaluate:  tolerance to diet/supplement, nutrition related lab values, weight trends, BMs/GI distress, hydration status, skin integrity.    Emerald Krishnan, MS, RDN (Pager #[phone]) | Also available on TEAMS

## 2024-05-21 NOTE — PROGRESS NOTE ADULT - ASSESSMENT
73-year-old male past medical history hypertension, ESRD on dialysis Monday Wednesday Friday, diabetes insulin-dependent presents with hiccups patient endorses persistent hiccups for the last 2 days.   found to have peaked T waves, a new finding. denied dizziness, N/V, denies CP, palps, abd pain  Upon admission seen by CArd, renal and GI  found to have a distended GB prob 2/2 gastroparesis, was started on Reglan  has received 4 doses of baclofen since 4/30 2/2 hiccups, last dose on 5/1 at 5 am  had received Haldol for agitation at 11 pm on 4/30, AMS observed in pm of 5/1  AMS ongoing, RRT x 2 called  now transferred to MICU for encephalopathy requiring  airway protection on 5/2,  intubated for airway protection, was on  propofol and pressors. now off  toxicology consulted upon dx of encephalopathy, not impressed AMS 2/2 Haldol nor baclofen . AMS was 2/2 acute CVA   HD was not done timely until femoral shiley was placed 2/2 clotted RUE AVF. now removed and RIJ shiley placed on 5/3  has been nonverbal since pm 5/1.     MR brain 5/6 c/w L pontine and L cerebellar acute infarcts, no hemorrhage . as per neuro: embolic in nature.   encephalopathy probably 2/2 acute CVA, now follows commands appropriately off sedation.   no SZ focus on EEG,   off CRRT,  HD resumed as per renal.   remains intubated, now trached  off keppra  AC resumed, on Heparin drip for R common iliac DVT  completed 5 days of empiric ZOSYN on 5/7, shortly after became septic again after an extubation trial. bacteremia w B. cereus, on Vanc through 5/20 as per ID    s/p trache placement by pulm,   IR unable to find a window for G-J tube. PEG placed by surgery 5/17, resume feeds when cleared by surgery  HD via R IJ.  Tmax overnight( 5/18)  101, cxr c/w RLL PNA, now on Zosyn, blood Cx s sent. remains on Vanco for B. cereus bacteremia   leukocytosis resolved  EKG changes on 5/3 c/w NSTEMI loaded w DAPT , was  on Heparin drip x 48 hrs. rpt TTE is unchanged  ID, Neuro , renal, cardiology following.  clotted RUE AVF. fistulogram some time this week w Dr. Lockett,  vascular  HD via RIght IJ Shiley.   LP done, no sign of meningitis.   NEUROLOGY     - CTH without acute findings   - LP done, no acute findings  - MR brain w acute infarcts: L talya and L cerebellum, nonhemorrhagic  - no Sz focus on EEG    CARDIOVASCULAR   - ptn never had CP. just peaked T waves. was awaiting ischemic study w nucl stress test  - TTE showing EF 72%, rpt unchanged  - EKG changes on 5/3, loaded w DAPT now on Heparin drip 2/2 DVT    GI  - PEG placed, npo w tube feeds  - Gastroparesis       RENAL   -  HD as per renal  - via R IJ shiley  -  fistula study of RUE  as per VAscular 5/24  - PAC on 5/24      INFECTIOUS DISEASE     completed a course of Zosyn, then became septic, bacteremic a B. cereus, completed 3 days of Meropenem, now on vanc through 5/20  on Zosyn since 5/18 for RLL PNA, afebrile    ENDOCRINE   - DM  - cw ISS    GOC:  Full code

## 2024-05-21 NOTE — PROGRESS NOTE ADULT - ASSESSMENT
73M PMHx HTN, ESRD, IDDM p/w hiccups and started on baclofen with concern for AMS overnight and desaturation, ?cheye nascimento respirations. Labs with numerous metabolic derangements. CTH with bifrontal encephalomalacia, likley traumatic.   WBC inc significantly, now intubated. Exam limited as on propofol, also on pressors.   s/p LP for meningitis concerns however appears bland - not on antibiotics  EEG with GPDs, no seizures  MR brain with punctuate L pontine and L cerebellar infarcts  MRA H/N with mild basilar fenestration, otherwise neg  mental status improving post extubation but now reintubated for recurrent aspiration   s/p trach, neurologically improving  o/e with R hemiparesis, mental status markedly improved    AMS of unclear etiology - ? baclofen toxicity, no evidence of seizures. 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 - embolic appearing  Intractable hiccups  hypoxic resp failure  ESRD on HD  B cereus bacteremia, likely contaminant  Nonocclusive R common iliac DVT    MRI, MRA reviewed, as above - R hemiparesis on exam likely related to known L sided strokes - previously giving poor effort on exam   cont aspirin 81mg  TTE reviewed, no need to repeat for now  Keppra started for GPDs, no improvement in mental status - now better off keppra  continue to monitor off Keppra, avoid baclofen and reglan  s/p trach, peg   HD per nephro  f/u remainder of CSF - negative  s/p Zosyn, Meropenem for pna   Now on ZOsyn for PNA and Vanc for bactremia per ID  on hep gtt for DVT  Fistulogram planned per vasc sx

## 2024-05-21 NOTE — PROGRESS NOTE ADULT - SUBJECTIVE AND OBJECTIVE BOX
[facility]   MD ELIANE BHAGAT MD MARIA SANTIAGO, NP    TEL:  OFFICE: [phone]  From 5pm-7am Answering Service [phone]    -- RENAL FOLLOW UP NOTE ---Date of Service 05-21-24 @ 15:28    Patient is a 73y old  Male who presents with a chief complaint of Unstable angina, abn EKG      Patient seen and examined at bedside. Patient has trach to vent in no apparent distress.    VITALS:  T(F): 98.9 (05-21-24 @ 04:00), Max: 98.9 (05-21-24 @ 04:00)  HR: 65 (05-21-24 @ 11:02)  BP: 168/71 (05-21-24 @ 04:00)  RR: 18 (05-21-24 @ 04:00)  SpO2: 97% (05-21-24 @ 11:02)  Wt(kg): --    05-20 @ 07:01  -  05-21 @ 07:00  --------------------------------------------------------  IN: 1072 mL / OUT: 2150 mL / NET: -1078 mL          PHYSICAL EXAM:  General: NAD  Neck: No JVD  Respiratory: CTAB, no wheezes, rales or rhonchi; + trach  Cardiovascular: S1, S2, RRR  Gastrointestinal: BS+, soft, NT/ND  Extremities: No peripheral edema    Hospital Medications:   MEDICATIONS  (STANDING):  albuterol/ipratropium for Nebulization 3 milliLiter(s) Nebulizer every 12 hours  aspirin  chewable 81 milliGRAM(s) Oral daily  atorvastatin 20 milliGRAM(s) Oral at bedtime  carvedilol 12.5 milliGRAM(s) Oral every 12 hours  chlorhexidine 0.12% Liquid 15 milliLiter(s) Oral Mucosa every 12 hours  chlorhexidine 2% Cloths 1 Application(s) Topical <User Schedule>  dextrose 10% Bolus 125 milliLiter(s) IV Bolus once  dextrose 5%. 1000 milliLiter(s) (50 mL/Hr) IV Continuous <Continuous>  dextrose 5%. 1000 milliLiter(s) (100 mL/Hr) IV Continuous <Continuous>  dextrose 50% Injectable 25 Gram(s) IV Push once  dextrose 50% Injectable 12.5 Gram(s) IV Push once  epoetin reilly (EPOGEN) Injectable 74589 Unit(s) IV Push <User Schedule>  ferrous    sulfate Liquid 300 milliGRAM(s) Enteral Tube daily  glucagon  Injectable 1 milliGRAM(s) IntraMuscular once  heparin  Infusion. 1100 Unit(s)/Hr (11 mL/Hr) IV Continuous <Continuous>  hydrALAZINE 100 milliGRAM(s) Oral three times a day  insulin lispro (ADMELOG) corrective regimen sliding scale   SubCutaneous every 6 hours  insulin NPH human recombinant 4 Unit(s) SubCutaneous every 6 hours  piperacillin/tazobactam IVPB.. 3.375 Gram(s) IV Intermittent every 12 hours  sodium bicarbonate 650 milliGRAM(s) Oral every 8 hours  sodium chloride 3%  Inhalation 4 milliLiter(s) Inhalation every 12 hours      LABS:  05-21    133<L>  |  92<L>  |  33<H>  ----------------------------<  219<H>  3.4<L>   |  26  |  3.58<H>    Ca    8.0<L>      21 May 2024 05:25  Phos  1.5     05-21  Mg     2.40     05-21      Creatinine Trend: 3.58 <--, 6.25 <--, 5.08 <--, 3.64 <--, 6.17 <--, 4.55 <--, 6.91 <--    Phosphorus: 1.5 mg/dL (05-21 @ 05:25)                              7.9    10.27 )-----------( 378      ( 21 May 2024 05:25 )             23.6     Urine Studies:  Urinalysis - [05-21-24 @ 05:25]      Color  / Appearance  / SG  / pH       Gluc 219 / Ketone   / Bili  / Urobili        Blood  / Protein  / Leuk Est  / Nitrite       RBC  / WBC  / Hyaline  / Gran  / Sq Epi  / Non Sq Epi  / Bacteria       Iron 16, TIBC 121, %sat 13      [05-17-24 @ 06:00]  Ferritin 720      [05-17-24 @ 06:00]  TSH 2.60      [05-17-24 @ 06:00]  Lipid: chol 86, , HDL 27, LDL --      [05-17-24 @ 06:00]    HBsAb <3.0      [05-01-24 @ 14:42]  HBcAb Nonreact      [05-01-24 @ 14:42]      RADIOLOGY & ADDITIONAL STUDIES:

## 2024-05-21 NOTE — PROGRESS NOTE ADULT - SUBJECTIVE AND OBJECTIVE BOX
Patient is a 73y Male     Patient is a 73y old  Male who presents with a chief complaint of Unstable angina, abn EKG (20 May 2024 21:49)      HPI:  73-year-old male past medical history hypertension, ESRD on dialysis Monday Wednesday Friday, diabetes insulin-dependent presents with hiccups patient endorses persistent hiccups for the last 2 days. found to have peaked T waves, a new finding. denies dizziness, N/V, denies CP, palps, abd pain (29 Apr 2024 21:15)      PAST MEDICAL & SURGICAL HISTORY:  Diabetes      Benign essential HTN      HLD (hyperlipidemia)      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      ESRD on hemodialysis      Arteriovenous fistula          MEDICATIONS  (STANDING):  albuterol/ipratropium for Nebulization 3 milliLiter(s) Nebulizer every 12 hours  aspirin  chewable 81 milliGRAM(s) Oral daily  atorvastatin 20 milliGRAM(s) Oral at bedtime  carvedilol 12.5 milliGRAM(s) Oral every 12 hours  chlorhexidine 0.12% Liquid 15 milliLiter(s) Oral Mucosa every 12 hours  chlorhexidine 2% Cloths 1 Application(s) Topical <User Schedule>  dextrose 10% Bolus 125 milliLiter(s) IV Bolus once  dextrose 5%. 1000 milliLiter(s) (100 mL/Hr) IV Continuous <Continuous>  dextrose 5%. 1000 milliLiter(s) (50 mL/Hr) IV Continuous <Continuous>  dextrose 50% Injectable 12.5 Gram(s) IV Push once  dextrose 50% Injectable 25 Gram(s) IV Push once  epoetin reilly (EPOGEN) Injectable 70124 Unit(s) IV Push <User Schedule>  ferrous    sulfate Liquid 300 milliGRAM(s) Enteral Tube daily  glucagon  Injectable 1 milliGRAM(s) IntraMuscular once  heparin  Infusion. 1100 Unit(s)/Hr (11 mL/Hr) IV Continuous <Continuous>  hydrALAZINE 100 milliGRAM(s) Oral three times a day  insulin lispro (ADMELOG) corrective regimen sliding scale   SubCutaneous every 6 hours  insulin NPH human recombinant 4 Unit(s) SubCutaneous every 6 hours  piperacillin/tazobactam IVPB.. 3.375 Gram(s) IV Intermittent every 12 hours  sevelamer carbonate Powder 1600 milliGRAM(s) Oral every 8 hours  sodium bicarbonate 650 milliGRAM(s) Oral every 8 hours  sodium chloride 3%  Inhalation 4 milliLiter(s) Inhalation every 12 hours      Allergies    No Known Allergies    Intolerances        SOCIAL HISTORY:  Denies ETOh,Smoking,     FAMILY HISTORY:  FHx: diabetes mellitus (Father, Aunt)        REVIEW OF SYSTEMS:    CONSTITUTIONAL: No weakness, fevers or chills  EYES/ENT: No visual changes;  No vertigo or throat pain   NECK: No pain or stiffness  RESPIRATORY: No cough, wheezing, hemoptysis; No shortness of breath  CARDIOVASCULAR: No chest pain or palpitations  GASTROINTESTINAL: No abdominal or epigastric pain. No nausea, vomiting, or hematemesis; No diarrhea or constipation. No melena or hematochezia.  GENITOURINARY: No dysuria, frequency or hematuria  NEUROLOGICAL: No numbness or weakness  SKIN: No itching, burning, rashes, or lesions   All other review of systems is negative unless indicated above.    VITAL:  T(C): , Max: 37.2 (05-20-24 @ 13:00)  T(F): , Max: 98.9 (05-20-24 @ 13:00)  HR: 67 (05-21-24 @ 03:51)  BP: 161/76 (05-21-24 @ 01:00)  BP(mean): --  RR: 17 (05-21-24 @ 01:00)  SpO2: 100% (05-21-24 @ 03:51)  Wt(kg): --    I and O's:    05-18 @ 07:01  -  05-19 @ 07:00  --------------------------------------------------------  IN: 722 mL / OUT: 0 mL / NET: 722 mL    05-19 @ 07:01  -  05-20 @ 07:00  --------------------------------------------------------  IN: 696 mL / OUT: 0 mL / NET: 696 mL    05-20 @ 07:01  -  05-21 @ 06:47  --------------------------------------------------------  IN: 1072 mL / OUT: 2150 mL / NET: -1078 mL          PHYSICAL EXAM:    Constitutional: NAD  HEENT: PERRLA,   Neck: No JVD  Respiratory: CTA B/L  Cardiovascular: S1 and S2  Gastrointestinal: BS+, soft, NT/ND  Extremities: No peripheral edema  Neurological: A/O x 3, no focal deficits  Psychiatric: Normal mood, normal affect  : No Abbasi  Skin: No rashes  Access: Not applicable  Back: No CVA tenderness    LABS:                        7.4    9.06  )-----------( 349      ( 20 May 2024 06:31 )             21.6     05-20    130<L>  |  88<L>  |  68<H>  ----------------------------<  207<H>  3.9   |  27  |  6.25<H>    Ca    7.7<L>      20 May 2024 06:31  Phos  4.0     05-20  Mg     2.80     05-20            RADIOLOGY & ADDITIONAL STUDIES:

## 2024-05-21 NOTE — CONSULT NOTE ADULT - ASSESSMENT
Interventional Radiology    Evaluate for Procedure: shiley exchange     HPI: 73-year-old male past medical history hypertension, ESRD on dialysis Monday Wednesday Friday, diabetes insulin-dependent presents with hiccups patient endorses persistent hiccups for the last 2 days. found to have peaked T waves, a new finding. denies dizziness, N/V, denies CP, palps, abd pain    Allergies: No Known Allergies    Medications (Abx/Cardiac/Anticoagulation/Blood Products)    aspirin  chewable: 81 milliGRAM(s) Oral (05-21 @ 11:28)  carvedilol: 12.5 milliGRAM(s) Oral (05-21 @ 08:36)  heparin  Infusion.: 1100 Unit(s)/Hr IV Continuous (05-20 @ 08:17)  hydrALAZINE: 100 milliGRAM(s) Oral (05-20 @ 08:15)  hydrALAZINE: 100 milliGRAM(s) Oral (05-21 @ 11:28)  piperacillin/tazobactam IVPB..: 25 mL/Hr IV Intermittent (05-21 @ 05:13)  vancomycin  IVPB: 100 mL/Hr IV Intermittent (05-21 @ 00:51)    Data:    T(C): 37.2  HR: 65  BP: 168/71  RR: 18  SpO2: 97%    -WBC 10.27 / HgB 7.9 / Hct 23.6 / Plt 378  -Na 133 / Cl 92 / BUN 33 / Glucose 219  -K 3.4 / CO2 26 / Cr 3.58  -ALT -- / Alk Phos -- / T.Bili --  -INR -- / PTT 61.5      Radiology:   - relevant imaging reviewed     Assessment/Plan:   73M PMH ESRD on HD MWF, HTN p/w hiccups found to have demand ischemia and angina. Course c/b encephalopathy req MICU stay with intubation for airway protection s/p trach now on zosyn for PNA, acute L pontine and L cerebellar infarcts on MRI 5/6, thrombosed RUE AVF and DVT on heparin gtt. Pt currently receiving HD via RENETTA jarrett placed 5/13. IR consulted for exchange.    - No role for exchange of noninfected nontunneled HD catheter. Pt is currently planned for fistulogram friday. Recommend keeping current nontunneled HD cath in place pending fistulogram.   - If pt requires permacath after fistulogram, please reconsult IR to eval for permacath placement.     --  Siddharth Nava MD, PGY-3  Vascular and Interventional Radiology   Available on Microsoft Teams    - Non-emergent consults: Place IR consult order in [place]  - Emergent issues (pager): [facility] [phone]; [facility] [phone]; [phone]  - Scheduling questions: [facility] [phone]; [facility] [phone]  - Clinic/outpatient booking: [facility] [phone]; [facility] [phone]

## 2024-05-21 NOTE — CHART NOTE - NSCHARTNOTEFT_GEN_A_CORE
[facility] NIGHT MEDICINE COVERAGE    Notified by RN that pt is having increased pink to blood-tinged to secretions throughout evening while on heparin gtt, pt satting well on vent, noted to be hypertensive.  aPTT has been therapeutic x2.  Pt reassessed at bedside, pt awake, nods "no" to question of if he is in pain or having any difficulty breathing.  Suction cannister noted to contain ~150 mL of dark red secretions, RN notes that the cannister had some of this content at the start of their shift.  Repeat tracheal suctioning performed by RN during bedside visit, no suction output noted.  Will send AM labs including CBC.  MICU curbsided for recs regarding increased bloody output from trach, recommended checking CXR.  CXR ordered.  Pt is hemodynamically stable presently.  Will endorse to day provider in AM.  D/w RN.    Vital Signs Last 24 Hrs  T(C): 37.1 (21 May 2024 01:00), Max: 37.2 (20 May 2024 13:00)  T(F): 98.7 (21 May 2024 01:00), Max: 98.9 (20 May 2024 13:00)  HR: 67 (21 May 2024 03:51) (55 - 79)  BP: 161/76 (21 May 2024 01:00) (114/68 - 178/51)  RR: 17 (21 May 2024 01:00) (14 - 22)  SpO2: 100% (21 May 2024 03:51) (95% - 100%)  O2 Parameters below as of 21 May 2024 01:00  Patient On (Oxygen Delivery Method): ventilator  O2 Concentration (%): 30    Bowen Kendall PA-C  Department of Hospital Medicine - Night [facility]  In-House Pager: #[phone]

## 2024-05-21 NOTE — PROGRESS NOTE ADULT - SUBJECTIVE AND OBJECTIVE BOX
CHIEF COMPLAINT: Patient is a 73y old  Male who presents with a chief complaint of Unstable angina, abn EKG (21 May 2024 06:47)      Interval Events:    REVIEW OF SYSTEMS:  [ ] All other systems negative  [ ] Unable to assess ROS because ________    Mode: AC/ CMV (Assist Control/ Continuous Mandatory Ventilation), RR (machine): 12, TV (machine): 500, FiO2: 30, PEEP: 5, ITime: 0.67, MAP: 9, PIP: 24      OBJECTIVE:  ICU Vital Signs Last 24 Hrs  T(C): 37.2 (21 May 2024 04:00), Max: 37.2 (20 May 2024 13:00)  T(F): 98.9 (21 May 2024 04:00), Max: 98.9 (20 May 2024 13:00)  HR: 68 (21 May 2024 09:54) (55 - 79)  BP: 168/71 (21 May 2024 04:00) (114/68 - 170/64)  BP(mean): --  ABP: --  ABP(mean): --  RR: 18 (21 May 2024 04:00) (14 - 22)  SpO2: 100% (21 May 2024 09:54) (95% - 100%)    O2 Parameters below as of 21 May 2024 09:54  Patient On (Oxygen Delivery Method): ventilator          Mode: AC/ CMV (Assist Control/ Continuous Mandatory Ventilation), RR (machine): 12, TV (machine): 500, FiO2: 30, PEEP: 5, ITime: 0.67, MAP: 9, PIP: 24    05-20 @ 07:01  -  05-21 @ 07:00  --------------------------------------------------------  IN: 1072 mL / OUT: 2150 mL / NET: -1078 mL      CAPILLARY BLOOD GLUCOSE      POCT Blood Glucose.: 229 mg/dL (21 May 2024 05:20)      HOSPITAL MEDICATIONS:  MEDICATIONS  (STANDING):  albuterol/ipratropium for Nebulization 3 milliLiter(s) Nebulizer every 12 hours  aspirin  chewable 81 milliGRAM(s) Oral daily  atorvastatin 20 milliGRAM(s) Oral at bedtime  carvedilol 12.5 milliGRAM(s) Oral every 12 hours  chlorhexidine 0.12% Liquid 15 milliLiter(s) Oral Mucosa every 12 hours  chlorhexidine 2% Cloths 1 Application(s) Topical <User Schedule>  dextrose 10% Bolus 125 milliLiter(s) IV Bolus once  dextrose 5%. 1000 milliLiter(s) (50 mL/Hr) IV Continuous <Continuous>  dextrose 5%. 1000 milliLiter(s) (100 mL/Hr) IV Continuous <Continuous>  dextrose 50% Injectable 12.5 Gram(s) IV Push once  dextrose 50% Injectable 25 Gram(s) IV Push once  epoetin reilly (EPOGEN) Injectable 31741 Unit(s) IV Push <User Schedule>  ferrous    sulfate Liquid 300 milliGRAM(s) Enteral Tube daily  glucagon  Injectable 1 milliGRAM(s) IntraMuscular once  heparin  Infusion. 1100 Unit(s)/Hr (11 mL/Hr) IV Continuous <Continuous>  hydrALAZINE 100 milliGRAM(s) Oral three times a day  insulin lispro (ADMELOG) corrective regimen sliding scale   SubCutaneous every 6 hours  insulin NPH human recombinant 4 Unit(s) SubCutaneous every 6 hours  piperacillin/tazobactam IVPB.. 3.375 Gram(s) IV Intermittent every 12 hours  potassium chloride   Powder 40 milliEquivalent(s) Oral once  potassium phosphate / sodium phosphate Powder (PHOS-NaK) 1 Packet(s) Oral once  sevelamer carbonate Powder 1600 milliGRAM(s) Oral every 8 hours  sodium bicarbonate 650 milliGRAM(s) Oral every 8 hours  sodium chloride 3%  Inhalation 4 milliLiter(s) Inhalation every 12 hours    MEDICATIONS  (PRN):  acetaminophen     Tablet .. 650 milliGRAM(s) Oral every 6 hours PRN Temp greater or equal to 38C (100.4F), Mild Pain (1 - 3)  dextrose Oral Gel 15 Gram(s) Oral once PRN Blood Glucose LESS THAN 70 milliGRAM(s)/deciliter  melatonin 3 milliGRAM(s) Oral at bedtime PRN Insomnia  sodium chloride 0.9% lock flush 10 milliLiter(s) IV Push every 1 hour PRN Pre/post blood products, medications, blood draw, and to maintain line patency      LABS:                        7.9    10.27 )-----------( 378      ( 21 May 2024 05:25 )             23.6     05-21    133<L>  |  92<L>  |  33<H>  ----------------------------<  219<H>  3.4<L>   |  26  |  3.58<H>    Ca    8.0<L>      21 May 2024 05:25  Phos  1.5     05-21  Mg     2.40     05-21      PTT - ( 21 May 2024 05:25 )  PTT:61.5 sec  Urinalysis Basic - ( 21 May 2024 05:25 )    Color: x / Appearance: x / SG: x / pH: x  Gluc: 219 mg/dL / Ketone: x  / Bili: x / Urobili: x   Blood: x / Protein: x / Nitrite: x   Leuk Esterase: x / RBC: x / WBC x   Sq Epi: x / Non Sq Epi: x / Bacteria: x        Venous Blood Gas:  05-20 @ 14:09  7.43/44/153/29/98.4  VBG Lactate: 1.3      PAST MEDICAL & SURGICAL HISTORY:  Diabetes      Benign essential HTN      HLD (hyperlipidemia)      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 CHIEF COMPLAINT: Patient is a 73y old  Male who presents with a chief complaint of Unstable angina, abn EKG (21 May 2024 06:47)    Interval Events: +small blood tinged sputum noted from trach overnight, no active bleeding at this time. Clinically unchanged, and stable. IR and Nephro recs appreciated. Plan for possible fistulogram/Permacath on Friday. VSS, and medications reviewed.    REVIEW OF SYSTEMS:  See above  [x] All other systems negative    Mode: AC/ CMV (Assist Control/ Continuous Mandatory Ventilation), RR (machine): 12, TV (machine): 500, FiO2: 30, PEEP: 5, ITime: 0.67, MAP: 9, PIP: 24    OBJECTIVE:  ICU Vital Signs Last 24 Hrs  T(C): 37.2 (21 May 2024 04:00), Max: 37.2 (20 May 2024 13:00)  T(F): 98.9 (21 May 2024 04:00), Max: 98.9 (20 May 2024 13:00)  HR: 68 (21 May 2024 09:54) (55 - 79)  BP: 168/71 (21 May 2024 04:00) (114/68 - 170/64)  BP(mean): --  ABP: --  ABP(mean): --  RR: 18 (21 May 2024 04:00) (14 - 22)  SpO2: 100% (21 May 2024 09:54) (95% - 100%)    O2 Parameters below as of 21 May 2024 09:54  Patient On (Oxygen Delivery Method): ventilator    Mode: AC/ CMV (Assist Control/ Continuous Mandatory Ventilation), RR (machine): 12, TV (machine): 500, FiO2: 30, PEEP: 5, ITime: 0.67, MAP: 9, PIP: 24    05-20 @ 07:01  -  05-21 @ 07:00  --------------------------------------------------------  IN: 1072 mL / OUT: 2150 mL / NET: -1078 mL    CAPILLARY BLOOD GLUCOSE    POCT Blood Glucose.: 229 mg/dL (21 May 2024 05:20)    HOSPITAL MEDICATIONS:  MEDICATIONS  (STANDING):  albuterol/ipratropium for Nebulization 3 milliLiter(s) Nebulizer every 12 hours  aspirin  chewable 81 milliGRAM(s) Oral daily  atorvastatin 20 milliGRAM(s) Oral at bedtime  carvedilol 12.5 milliGRAM(s) Oral every 12 hours  chlorhexidine 0.12% Liquid 15 milliLiter(s) Oral Mucosa every 12 hours  chlorhexidine 2% Cloths 1 Application(s) Topical <User Schedule>  dextrose 10% Bolus 125 milliLiter(s) IV Bolus once  dextrose 5%. 1000 milliLiter(s) (50 mL/Hr) IV Continuous <Continuous>  dextrose 5%. 1000 milliLiter(s) (100 mL/Hr) IV Continuous <Continuous>  dextrose 50% Injectable 12.5 Gram(s) IV Push once  dextrose 50% Injectable 25 Gram(s) IV Push once  epoetin reilly (EPOGEN) Injectable 97625 Unit(s) IV Push <User Schedule>  ferrous    sulfate Liquid 300 milliGRAM(s) Enteral Tube daily  glucagon  Injectable 1 milliGRAM(s) IntraMuscular once  heparin  Infusion. 1100 Unit(s)/Hr (11 mL/Hr) IV Continuous <Continuous>  hydrALAZINE 100 milliGRAM(s) Oral three times a day  insulin lispro (ADMELOG) corrective regimen sliding scale   SubCutaneous every 6 hours  insulin NPH human recombinant 4 Unit(s) SubCutaneous every 6 hours  piperacillin/tazobactam IVPB.. 3.375 Gram(s) IV Intermittent every 12 hours  potassium chloride   Powder 40 milliEquivalent(s) Oral once  potassium phosphate / sodium phosphate Powder (PHOS-NaK) 1 Packet(s) Oral once  sevelamer carbonate Powder 1600 milliGRAM(s) Oral every 8 hours  sodium bicarbonate 650 milliGRAM(s) Oral every 8 hours  sodium chloride 3%  Inhalation 4 milliLiter(s) Inhalation every 12 hours    MEDICATIONS  (PRN):  acetaminophen     Tablet .. 650 milliGRAM(s) Oral every 6 hours PRN Temp greater or equal to 38C (100.4F), Mild Pain (1 - 3)  dextrose Oral Gel 15 Gram(s) Oral once PRN Blood Glucose LESS THAN 70 milliGRAM(s)/deciliter  melatonin 3 milliGRAM(s) Oral at bedtime PRN Insomnia  sodium chloride 0.9% lock flush 10 milliLiter(s) IV Push every 1 hour PRN Pre/post blood products, medications, blood draw, and to maintain line patency    LABS:                        7.9    10.27 )-----------( 378      ( 21 May 2024 05:25 )             23.6     05-21    133<L>  |  92<L>  |  33<H>  ----------------------------<  219<H>  3.4<L>   |  26  |  3.58<H>    Ca    8.0<L>      21 May 2024 05:25  Phos  1.5     05-21  Mg     2.40     05-21      PTT - ( 21 May 2024 05:25 )  PTT:61.5 sec  Urinalysis Basic - ( 21 May 2024 05:25 )    Color: x / Appearance: x / SG: x / pH: x  Gluc: 219 mg/dL / Ketone: x  / Bili: x / Urobili: x   Blood: x / Protein: x / Nitrite: x   Leuk Esterase: x / RBC: x / WBC x   Sq Epi: x / Non Sq Epi: x / Bacteria: x    Venous Blood Gas:  05-20 @ 14:09  7.43/44/153/29/98.4  VBG Lactate: 1.3    PAST MEDICAL & SURGICAL HISTORY:  Diabetes    Benign essential HTN    HLD (hyperlipidemia)    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    ESRD on hemodialysis    Arteriovenous fistula    FAMILY HISTORY:  FHx: diabetes mellitus (Father, Aunt)    Social History:    RADIOLOGY:  [ ] Reviewed and interpreted by me    PULMONARY FUNCTION TESTS:    EKG:

## 2024-05-22 LAB
ADD ON TEST-SPECIMEN IN LAB: SIGNIFICANT CHANGE UP
ALBUMIN SERPL ELPH-MCNC: 1.9 G/DL — LOW (ref 3.3–5)
ANION GAP SERPL CALC-SCNC: 14 MMOL/L — SIGNIFICANT CHANGE UP (ref 7–14)
APTT BLD: 67 SEC — HIGH (ref 24.5–35.6)
BLD GP AB SCN SERPL QL: NEGATIVE — SIGNIFICANT CHANGE UP
BUN SERPL-MCNC: 43 MG/DL — HIGH (ref 7–23)
CALCIUM SERPL-MCNC: 6.5 MG/DL — CRITICAL LOW (ref 8.4–10.5)
CHLORIDE SERPL-SCNC: 99 MMOL/L — SIGNIFICANT CHANGE UP (ref 98–107)
CO2 SERPL-SCNC: 21 MMOL/L — LOW (ref 22–31)
CREAT SERPL-MCNC: 4.22 MG/DL — HIGH (ref 0.5–1.3)
EGFR: 14 ML/MIN/1.73M2 — LOW
GLUCOSE BLDC GLUCOMTR-MCNC: 159 MG/DL — HIGH (ref 70–99)
GLUCOSE BLDC GLUCOMTR-MCNC: 206 MG/DL — HIGH (ref 70–99)
GLUCOSE BLDC GLUCOMTR-MCNC: 215 MG/DL — HIGH (ref 70–99)
GLUCOSE BLDC GLUCOMTR-MCNC: 237 MG/DL — HIGH (ref 70–99)
GLUCOSE BLDC GLUCOMTR-MCNC: 291 MG/DL — HIGH (ref 70–99)
GLUCOSE SERPL-MCNC: 209 MG/DL — HIGH (ref 70–99)
HCT VFR BLD CALC: 20.2 % — CRITICAL LOW (ref 39–50)
HCT VFR BLD CALC: 21.6 % — LOW (ref 39–50)
HGB BLD-MCNC: 6.8 G/DL — CRITICAL LOW (ref 13–17)
HGB BLD-MCNC: 7.3 G/DL — LOW (ref 13–17)
MAGNESIUM SERPL-MCNC: 2.2 MG/DL — SIGNIFICANT CHANGE UP (ref 1.6–2.6)
MCHC RBC-ENTMCNC: 20.9 PG — LOW (ref 27–34)
MCHC RBC-ENTMCNC: 21.3 PG — LOW (ref 27–34)
MCHC RBC-ENTMCNC: 33.7 GM/DL — SIGNIFICANT CHANGE UP (ref 32–36)
MCHC RBC-ENTMCNC: 33.8 GM/DL — SIGNIFICANT CHANGE UP (ref 32–36)
MCV RBC AUTO: 62 FL — LOW (ref 80–100)
MCV RBC AUTO: 63 FL — LOW (ref 80–100)
NRBC # BLD: 0 /100 WBCS — SIGNIFICANT CHANGE UP (ref 0–0)
NRBC # BLD: 0 /100 WBCS — SIGNIFICANT CHANGE UP (ref 0–0)
NRBC # FLD: 0.04 K/UL — HIGH (ref 0–0)
NRBC # FLD: 0.05 K/UL — HIGH (ref 0–0)
PHOSPHATE SERPL-MCNC: 1.3 MG/DL — LOW (ref 2.5–4.5)
PLATELET # BLD AUTO: 367 K/UL — SIGNIFICANT CHANGE UP (ref 150–400)
PLATELET # BLD AUTO: 405 K/UL — HIGH (ref 150–400)
POTASSIUM SERPL-MCNC: 4.2 MMOL/L — SIGNIFICANT CHANGE UP (ref 3.5–5.3)
POTASSIUM SERPL-SCNC: 4.2 MMOL/L — SIGNIFICANT CHANGE UP (ref 3.5–5.3)
RBC # BLD: 3.26 M/UL — LOW (ref 4.2–5.8)
RBC # BLD: 3.43 M/UL — LOW (ref 4.2–5.8)
RBC # FLD: 19.7 % — HIGH (ref 10.3–14.5)
RBC # FLD: 20.7 % — HIGH (ref 10.3–14.5)
RH IG SCN BLD-IMP: POSITIVE — SIGNIFICANT CHANGE UP
SODIUM SERPL-SCNC: 134 MMOL/L — LOW (ref 135–145)
WBC # BLD: 11.06 K/UL — HIGH (ref 3.8–10.5)
WBC # BLD: 9.71 K/UL — SIGNIFICANT CHANGE UP (ref 3.8–10.5)
WBC # FLD AUTO: 11.06 K/UL — HIGH (ref 3.8–10.5)
WBC # FLD AUTO: 9.71 K/UL — SIGNIFICANT CHANGE UP (ref 3.8–10.5)

## 2024-05-22 PROCEDURE — 99233 SBSQ HOSP IP/OBS HIGH 50: CPT

## 2024-05-22 RX ADMIN — CHLORHEXIDINE GLUCONATE 1 APPLICATION(S): 213 SOLUTION TOPICAL at 06:27

## 2024-05-22 RX ADMIN — PIPERACILLIN AND TAZOBACTAM 25 GRAM(S): 4; .5 INJECTION, POWDER, LYOPHILIZED, FOR SOLUTION INTRAVENOUS at 17:55

## 2024-05-22 RX ADMIN — Medication 100 MILLIGRAM(S): at 20:25

## 2024-05-22 RX ADMIN — Medication 100 MILLIGRAM(S): at 11:23

## 2024-05-22 RX ADMIN — HEPARIN SODIUM 1100 UNIT(S)/HR: 5000 INJECTION INTRAVENOUS; SUBCUTANEOUS at 07:11

## 2024-05-22 RX ADMIN — HUMAN INSULIN 4 UNIT(S): 100 INJECTION, SUSPENSION SUBCUTANEOUS at 17:55

## 2024-05-22 RX ADMIN — Medication 300 MILLIGRAM(S): at 11:24

## 2024-05-22 RX ADMIN — ATORVASTATIN CALCIUM 20 MILLIGRAM(S): 80 TABLET, FILM COATED ORAL at 20:26

## 2024-05-22 RX ADMIN — ERYTHROPOIETIN 10000 UNIT(S): 10000 INJECTION, SOLUTION INTRAVENOUS; SUBCUTANEOUS at 07:31

## 2024-05-22 RX ADMIN — SODIUM CHLORIDE 4 MILLILITER(S): 9 INJECTION INTRAMUSCULAR; INTRAVENOUS; SUBCUTANEOUS at 21:17

## 2024-05-22 RX ADMIN — CARVEDILOL PHOSPHATE 12.5 MILLIGRAM(S): 80 CAPSULE, EXTENDED RELEASE ORAL at 00:48

## 2024-05-22 RX ADMIN — Medication 4: at 17:54

## 2024-05-22 RX ADMIN — SODIUM CHLORIDE 4 MILLILITER(S): 9 INJECTION INTRAMUSCULAR; INTRAVENOUS; SUBCUTANEOUS at 08:44

## 2024-05-22 RX ADMIN — HUMAN INSULIN 4 UNIT(S): 100 INJECTION, SUSPENSION SUBCUTANEOUS at 11:24

## 2024-05-22 RX ADMIN — HUMAN INSULIN 4 UNIT(S): 100 INJECTION, SUSPENSION SUBCUTANEOUS at 23:21

## 2024-05-22 RX ADMIN — CARVEDILOL PHOSPHATE 12.5 MILLIGRAM(S): 80 CAPSULE, EXTENDED RELEASE ORAL at 08:00

## 2024-05-22 RX ADMIN — Medication 3 MILLILITER(S): at 08:44

## 2024-05-22 RX ADMIN — CARVEDILOL PHOSPHATE 12.5 MILLIGRAM(S): 80 CAPSULE, EXTENDED RELEASE ORAL at 20:26

## 2024-05-22 RX ADMIN — Medication 2: at 11:23

## 2024-05-22 RX ADMIN — HUMAN INSULIN 4 UNIT(S): 100 INJECTION, SUSPENSION SUBCUTANEOUS at 06:30

## 2024-05-22 RX ADMIN — Medication 4: at 23:21

## 2024-05-22 RX ADMIN — Medication 81 MILLIGRAM(S): at 11:22

## 2024-05-22 RX ADMIN — Medication 650 MILLIGRAM(S): at 05:01

## 2024-05-22 RX ADMIN — Medication 100 MILLIGRAM(S): at 04:52

## 2024-05-22 RX ADMIN — CHLORHEXIDINE GLUCONATE 15 MILLILITER(S): 213 SOLUTION TOPICAL at 17:55

## 2024-05-22 RX ADMIN — Medication 650 MILLIGRAM(S): at 20:26

## 2024-05-22 RX ADMIN — PIPERACILLIN AND TAZOBACTAM 25 GRAM(S): 4; .5 INJECTION, POWDER, LYOPHILIZED, FOR SOLUTION INTRAVENOUS at 05:01

## 2024-05-22 RX ADMIN — CHLORHEXIDINE GLUCONATE 15 MILLILITER(S): 213 SOLUTION TOPICAL at 05:01

## 2024-05-22 RX ADMIN — Medication 3 MILLILITER(S): at 21:17

## 2024-05-22 RX ADMIN — Medication 6: at 06:32

## 2024-05-22 RX ADMIN — Medication 650 MILLIGRAM(S): at 13:16

## 2024-05-22 NOTE — PROGRESS NOTE ADULT - ASSESSMENT
73-year-old male past medical history hypertension, ESRD on dialysis Monday Wednesday Friday, diabetes insulin-dependent presents with hiccups patient endorses persistent hiccups for the last 2 days. Nephrology consulted for HD needs.    A/P  ESRD:  Center: [place]  Nephrologist: Dr. Hardwick  Access: R AVF  MWF schedule  Vascular for fistulogram   s/p femoral shiley on 5/1, now removed  s/p placement of IJ shiley 5/3  Stopped CRRT   Restarted IHD  s/p HD 5/7 UF 1778; treatment terminated early due to hypotension   S/P MRA head/neck w/ gadolinium --> Received HD on 5/9 and 5/10.  5/10 Will need to dialyze 3 days consecutively--> plan for HD on 5/11 5/11 shiley removed due to bacteremia; did not receive HD.  Line holiday  5/12 - BUN worsened; K up trending.    5/13 Shiley placed.  5/17: s/p PEG placement.   5/21 IR consulted for shiley exchange; IR recommended to keep current shiley in place, with pending fistulogram   Planned for fistulogram on 5/24 + swelling of RUE.-follow up vascular  continue HD MWF   Consent obtained and placed in ED chart  Renal diet  Monitor [facility]  consider [facility] for rehab where his outpatient Nephro-Dr. Hardwick can follow him    Hyperkalemia/Hypokalemia  Improved w/ HD.  C/W HD schedule as above.  Lokelma 10gm PRN for K >5.3 on non-HD days  PhosNaK given 5/21  K better  Low K diet.  Monitor closely     HTN:  BP fluctuating.  On carvedilol 12.5mg BID, hydralazine 100mg TID, nifedipine 60mg qd.  UF w/ HD.  Monitor BP.    Anemia:  + iron deficiency.  Tsat 13% - on ferrous sulfate 300mg qd via PEG.  Will hold venofer for now in view of up trending leukocytosis.  SILVA w/ HD.  Tranfuse for Hgb <7.  Monitor Hb.    CKD-MBD  Check PTH  PO4 low   s/p PhosNaK 5/21  Sevelamer 1600mg TID d/c'ed 5/21  Repeat labs today  Low PO4 diet.  Monitor Ca, PO4 daily    Hypocalcemia:  In setting of CKD vs. hypoalbuminemia.  Optimize albumin.  Corrected Ca WNL.  Worsened today  Repeat labs  Monitor Ca.   73-year-old male past medical history hypertension, ESRD on dialysis Monday Wednesday Friday, diabetes insulin-dependent presents with hiccups patient endorses persistent hiccups for the last 2 days. Nephrology consulted for HD needs.    A/P  ESRD:  Center: [place]  Nephrologist: Dr. Hardwick  Access: R AVF  MWF schedule  Vascular for fistulogram   s/p femoral shiley on 5/1, now removed  s/p placement of IJ shiley 5/3  Stopped CRRT   Restarted IHD  s/p HD 5/7 UF 1778; treatment terminated early due to hypotension   S/P MRA head/neck w/ gadolinium --> Received HD on 5/9 and 5/10.  5/10 Will need to dialyze 3 days consecutively--> plan for HD on 5/11 5/11 shiley removed due to bacteremia; did not receive HD.  Line holiday  5/12 - BUN worsened; K up trending.    5/13 Shiley placed.  5/17: s/p PEG placement.   5/21 IR consulted for shiley exchange; IR recommended to keep current shiley in place, with pending fistulogram   Planned for fistulogram on 5/24 + swelling of RUE.-follow up vascular  continue HD MWF   Consent obtained and placed in ED chart  Renal diet  Monitor [facility]  consider [facility] for rehab where his outpatient Nephro-Dr. Hardwick can follow him    Hyperkalemia/Hypokalemia  Improved w/ HD.  C/W HD schedule as above.  Lokelma 10gm PRN for K >5.3 on non-HD days  PhosNaK given 5/21  K better  Low K diet.  Monitor closely     HTN:  BP fluctuating.  On carvedilol 12.5mg BID, hydralazine 100mg TID, nifedipine 60mg qd.  UF w/ HD.  Monitor BP.    Anemia:  + iron deficiency.  Tsat 13% - on ferrous sulfate 300mg qd via PEG.  Will hold venofer for now in view of up trending leukocytosis.  SILVA w/ HD.  Tranfuse for Hgb <7.  Monitor Hb.    CKD-MBD  Check PTH  PO4 low   s/p PhosNaK 5/21  Sevelamer 1600mg TID d/c'ed 5/21  Repeat labs today  Low PO4 diet.  Monitor Ca, PO4 daily    Hypocalcemia:  In setting of CKD vs. hypoalbuminemia.  Optimize albumin.  Corrected Ca WNL.  Worsened today  Repeat labs if Ca still low, replete  Monitor Ca.

## 2024-05-22 NOTE — PROGRESS NOTE ADULT - ASSESSMENT
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 Baclofen started and course complicated by AMS second to baclofen toxicity requiring intubation and MICU transfer. Course further complicated by LEFT talya and cerebellum stroke, R AVF stenosis, aspiration and B Cereus bacteremia and RLE DVT. s/p trach and transferred to RCU 5/16    NEUROLOGY  # AMS   - MRI HEAD (5/6) with punctate foci in the left talya and left cerebellum with concern for acute infarct.   - MRA HEAD and NECK (5/6) with no large vessel occlusion or stenosis.  - Continue on aspirin and hold DAPT for now pending procedures   - Continue on lipitor for medical management   - Supportive care     CARDIOVASCULAR  # CP with NSTEMI vs demand ischemia with HTN   - Admitted for CP and HTN with peaked T WAVES and ECG with ST deviation on admission   - Troponins elevated on admission with negative CKMB   - TTE (4/30) with EF 72, normal LVRVSF, and no regional wall motion abnormalities   - Case discussed with cardiology and no acute need for cath. DAPT loaded on 5/4 and plan for medical management with ASA, lipitor and heparin GTT.     # Septic vs vasoplegic shock  # Hx of HTN   - Home BP medications held and pressors weaned off   - Continue on coreg 12.5 and hydral 100   - Monitor BP with goal 150/90s    RESPIRATORY  # Respiratory failure   - AMS noted with baclofen toxicity requiring intubation   - Attempted extubation in MICU however course complicated by aspiration and prolonged course and now s/p tracheostomy with IP on 5/14  - Continue on vent 12/500/5/30 and able to tolerate SBT 10/5 x several hours but limited by weakness   - Continue on nebs and HTS Q12H for now     GI  # Dysphagia   - s/p surgical PEG 5/17   - Continue on tube feeds via PEG     RENAL  # ESRD on HD MWF with R BCF  # Fistula stenosis ?  - VA AVF (5/2) with Right radiocephalic arteriovenous fistula has no hemodynamically significant stenosis present at the anastomotic site ( cm/sec, EDV 49 cm/sec). The usable segment is partially thrombosed with minimal patency.  - Required R FEMORAL line on medicine, then removed on 5/2, and replaced with R IJ SHILEY on 5/3 for CRRT then converted back to iHD MWF   - R SHILEY removed on 5/10 second to bacteremia and replaced on 5/13   - Continue on HD MWF per HD   - Continue on renvela 1600   - Continue on HCO3 650 tabs   - Plan for fistulogram next week with vascular    INFECTIOUS DISEASE  # Aspiration   - Recurrent fever spike 5/17 overnight and CXR with RLL opacity   - BCx and SCx negative, however fever spikes improved on ABX   - Zosyn continued empirically (5/18 - )     # B Cereus Bacteremia   - Course complicated by fevers and aspiration event   - MRSA (5/1) negative   - BCx (5/2) negative   - SCx (5/4) and BAL (5/12) negative   - BCx (5/10) with bacillus cereus and cleared on (5/12).   - Case discussed with ID and plans to continue on vanco through 5/21 x 10 day course. Dose per level.     HEME  # AOCD with ESRD   - Anemia panel with AOCD and low # sat   - Transfused on 5/16   - EPO 10K continued on TIW   - Ferrous supplement added   - Monitor HH     VASCULAR   # RLE DVT   - CT AP (5/12) with nonocclusive RIGHT common iliac vein deep venous thrombosis  - Continue on heparin GTT   - RLE precautions     ENDOCRINE  # IDDM2 A1C 6.9  - Continue on NPH 4U and ISS   - Monitor and adjust insulin based on FS     SKIN  - R FEMORAL (5/1-5/2)   - R IJ SHILEY (5/3-5/10)   - R IJ SHILEY (5/13 - )     ETHICS/ GOC    - FULL CODE     DISPO - Vent facility    74 YO M with PMHx of ESRD on HD MWF, HTN, and 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 Baclofen started and course complicated by AMS second to baclofen toxicity requiring intubation and MICU transfer. Course further complicated by LEFT talya and cerebellum stroke, R AVF stenosis, aspiration and B Cereus bacteremia and RLE DVT. s/p trach and transferred to RCU 5/16    NEUROLOGY  # AMS   - MRI HEAD (5/6) with punctate foci in the left talya and left cerebellum with concern for acute infarct.   - MRA HEAD and NECK (5/6) with no large vessel occlusion or stenosis.  - Continue on aspirin and hold DAPT for now pending procedures   - Continue on Lipitor for medical management   - Supportive care     CARDIOVASCULAR  # CP with NSTEMI vs demand ischemia with HTN   - Admitted for CP and HTN with peaked T WAVES and ECG with ST deviation on admission   - Troponins elevated on admission with negative CKMB   - TTE (4/30) with EF 72, normal LVRVSF, and no regional wall motion abnormalities   - Case discussed with cardiology and no acute need for cath. DAPT loaded on 5/4 and plan for medical management with ASA, lipitor and heparin GTT.     # Septic vs vasoplegic shock  # Hx of HTN   - Home BP medications held and pressors weaned off   - Continue on coreg 12.5 and hydralazine 100 TID   - Monitor BP with goal 150/90s    RESPIRATORY  # Respiratory failure   - AMS noted with baclofen toxicity requiring intubation   - Attempted extubation in MICU however course complicated by aspiration and prolonged course and now s/p tracheostomy with IP on 5/14  - Continue on vent 12/500/5/30 and able to tolerate SBT 10/5 x several hours but limited by weakness   - Continue on nebs and HTS Q12H for now     GI  # Dysphagia   - s/p surgical PEG 5/17   - Continue on tube feeds via PEG     RENAL  # ESRD on HD MWF with R BCF  # Fistula stenosis ?  - VA AVF (5/2) with Right radiocephalic arteriovenous fistula has no hemodynamically significant stenosis present at the anastomotic site ( cm/sec, EDV 49 cm/sec). The usable segment is partially thrombosed with minimal patency.  - Required R FEMORAL line on medicine, then removed on 5/2, and replaced with R IJ SHILEY on 5/3 for CRRT then converted back to iHD MWF   - R SHILEY removed on 5/10 second to bacteremia and replaced on 5/13   - Continue on HD MWF per HD   - Continue on Renvela 1600   - Continue on HCO3 650 tabs   - Plan for Fistulogram tomorrow with Vascular - Will make NPO p MN    INFECTIOUS DISEASE  # Aspiration   - Recurrent fever spike 5/17 overnight and CXR with RLL opacity   - BCx and SCx negative, however fever spikes improved on ABX   - Zosyn continued empirically (5/18 - until 5/23)   - Blood cx 5/18 NGTD    # B Cereus Bacteremia   - Course complicated by fevers and aspiration event   - MRSA (5/1) negative   - BCx (5/2) negative   - SCx (5/4) and BAL (5/12) negative   - BCx (5/10) with bacillus cereus and cleared on (5/12).   - Case discussed with ID and s/p Vancomycin through 5/21 x 10 day course.     HEME  # AOCD with ESRD   - Anemia panel with AOCD and low # sat   - Transfused on 5/16   - EPO 10K continued on TIW   - Ferrous supplement added   - Monitor HH     VASCULAR   # RLE DVT   - CT AP (5/12) with nonocclusive RIGHT common iliac vein deep venous thrombosis  - Continue on heparin GTT   - RLE precautions     ENDOCRINE  # IDDM2 A1C 6.9  - Continue on NPH 4U and ISS   - Monitor and adjust insulin based on FS     SKIN  - R FEMORAL (5/1-5/2)   - R IJ SHILEY (5/3-5/10)   - R IJ SHILEY (5/13 - )     ETHICS/ GOC    - FULL CODE     DISPO - Vent facility

## 2024-05-22 NOTE — PROGRESS NOTE ADULT - ASSESSMENT
74 y/o M PMhx HTN, ESRD (on HD M/W/F), DM who presented with hiccups x 2 days and chest pain found to have peaked T waves. Hospital course c/b AMS s/p RRT on 5/1 and 5/2. Vascular consulted 2/2 RUE AVF access unable to be used s/p R femoral shiley placed for emergent HD. Transferred to MICU and intubated 5/2 for airway protection.   received empiric course of zosyn x 5 days, completed 5/7 and meropenem x 5 days, completed 5/14    PNA, fever  febrile 5/18  CXR- Hazy opacity in the right lower lung field    B. cereus bacteremia  blood cx 5/10 w/ Bacillus cereus in 1/4 bottles  repeat blood cultures 5/11- NGTD   s/p vanc x 10 days, completed 5/20    AMS, CVA  TTE- no evidence of valvular disease  s/p LP 5/2, viral encephalitis/ meningitis ruled out  MRI- Punctate foci of restricted diffusion in the left talya and left cerebellum with associated T2 prolongation consistent with acute infarcts  s/p trach 5/14, s/p PEG 5/17    DVT  CT- Nonocclusive R common iliac vein deep venous thrombosis.    Recommendations  c/w zosyn for PNA  complete 5 days w/ last day tomorrow 5/23    Steven Torres M.D.  [facility], Division of Infectious Diseases  [phone]  After 5pm on weekdays and all day on weekends - please call [phone]

## 2024-05-22 NOTE — PROGRESS NOTE ADULT - SUBJECTIVE AND OBJECTIVE BOX
Patient is a 73y Male     Patient is a 73y old  Male who presents with a chief complaint of Unstable angina, abn EKG (21 May 2024 22:35)      HPI:  73-year-old male past medical history hypertension, ESRD on dialysis Monday Wednesday Friday, diabetes insulin-dependent presents with hiccups patient endorses persistent hiccups for the last 2 days. found to have peaked T waves, a new finding. denies dizziness, N/V, denies CP, palps, abd pain (29 Apr 2024 21:15)      PAST MEDICAL & SURGICAL HISTORY:  Diabetes      Benign essential HTN      HLD (hyperlipidemia)      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      ESRD on hemodialysis      Arteriovenous fistula          MEDICATIONS  (STANDING):  albuterol/ipratropium for Nebulization 3 milliLiter(s) Nebulizer every 12 hours  aspirin  chewable 81 milliGRAM(s) Oral daily  atorvastatin 20 milliGRAM(s) Oral at bedtime  carvedilol 12.5 milliGRAM(s) Oral every 12 hours  chlorhexidine 0.12% Liquid 15 milliLiter(s) Oral Mucosa every 12 hours  chlorhexidine 2% Cloths 1 Application(s) Topical <User Schedule>  dextrose 10% Bolus 125 milliLiter(s) IV Bolus once  dextrose 5%. 1000 milliLiter(s) (50 mL/Hr) IV Continuous <Continuous>  dextrose 5%. 1000 milliLiter(s) (100 mL/Hr) IV Continuous <Continuous>  dextrose 50% Injectable 12.5 Gram(s) IV Push once  dextrose 50% Injectable 25 Gram(s) IV Push once  epoetin reilly (EPOGEN) Injectable 81062 Unit(s) IV Push <User Schedule>  ferrous    sulfate Liquid 300 milliGRAM(s) Enteral Tube daily  glucagon  Injectable 1 milliGRAM(s) IntraMuscular once  heparin  Infusion. 1100 Unit(s)/Hr (11 mL/Hr) IV Continuous <Continuous>  hydrALAZINE 100 milliGRAM(s) Oral three times a day  insulin lispro (ADMELOG) corrective regimen sliding scale   SubCutaneous every 6 hours  insulin NPH human recombinant 4 Unit(s) SubCutaneous every 6 hours  piperacillin/tazobactam IVPB.. 3.375 Gram(s) IV Intermittent every 12 hours  sodium bicarbonate 650 milliGRAM(s) Oral every 8 hours  sodium chloride 3%  Inhalation 4 milliLiter(s) Inhalation every 12 hours      Allergies    No Known Allergies    Intolerances        SOCIAL HISTORY:  Denies ETOh,Smoking,     FAMILY HISTORY:  FHx: diabetes mellitus (Father, Aunt)        REVIEW OF SYSTEMS:    CONSTITUTIONAL: No weakness, fevers or chills  EYES/ENT: No visual changes;  No vertigo or throat pain   NECK: No pain or stiffness  RESPIRATORY: No cough, wheezing, hemoptysis; No shortness of breath  CARDIOVASCULAR: No chest pain or palpitations  GASTROINTESTINAL: No abdominal or epigastric pain. No nausea, vomiting, or hematemesis; No diarrhea or constipation. No melena or hematochezia.  GENITOURINARY: No dysuria, frequency or hematuria  NEUROLOGICAL: No numbness or weakness  SKIN: No itching, burning, rashes, or lesions   All other review of systems is negative unless indicated above.    VITAL:  T(C): , Max: 37.1 (05-22-24 @ 00:00)  T(F): , Max: 98.7 (05-22-24 @ 00:00)  HR: 58 (05-22-24 @ 06:30)  BP: 180/53 (05-22-24 @ 06:30)  BP(mean): 79 (05-22-24 @ 04:00)  RR: 21 (05-22-24 @ 06:30)  SpO2: 100% (05-22-24 @ 06:30)  Wt(kg): --    I and O's:    05-20 @ 07:01  -  05-21 @ 07:00  --------------------------------------------------------  IN: 1072 mL / OUT: 2150 mL / NET: -1078 mL    05-21 @ 07:01  -  05-22 @ 07:00  --------------------------------------------------------  IN: 672 mL / OUT: 0 mL / NET: 672 mL          PHYSICAL EXAM:    Constitutional: NAD  HEENT: PERRLA,   Neck: No JVD  Respiratory: CTA B/L  Cardiovascular: S1 and S2  Gastrointestinal: BS+, soft, NT/ND  Extremities: No peripheral edema  Neurological: A/O x 3, no focal deficits  Psychiatric: Normal mood, normal affect  : No Abbasi  Skin: No rashes  Access: Not applicable  Back: No CVA tenderness    LABS:                        7.9    10.27 )-----------( 378      ( 21 May 2024 05:25 )             23.6     05-21    133<L>  |  92<L>  |  33<H>  ----------------------------<  219<H>  3.4<L>   |  26  |  3.58<H>    Ca    8.0<L>      21 May 2024 05:25  Phos  1.5     05-21  Mg     2.40     05-21            RADIOLOGY & ADDITIONAL STUDIES:

## 2024-05-22 NOTE — PROGRESS NOTE ADULT - ASSESSMENT
73M w/ PMHx hypertension, ESRD on HD via R radiocephalic fistula (MWF), DM, HTN, p/w hiccups and peaked T waves, admitted to MICU for c/f baclofen toxicity. Patient received 1x HD on admission. R femoral shiley removed 5/2, had 2 RRT for AMS and failed HD via AVF 5/3. S/p emergent R IJ shiley placement 5/3, started CRRT which is now transitioned back to iHD. Vascular planning RUE fistulogram when medically optimized. Extubated to HFNC then reintubated 5/12 for c/f aspiration w/ hypoxic resp failure. S/p trach 5/14. Downgraded from MICU to RCU 5/16.    Recommendations:   - Planning for fistulogram tomorrow  - 4 am labs (cbc, bmp, coags, type and screen)  - HOLD tube feeds at midnight  - hold heparin gtt on call to the OR  - hep gtt for nonocclusive R common iliac DVT  - Consent signed by wife, in chart  - Med/cards/ID optimization, please make sure risk stratification documented  - Continue HD via replaced R IJ shiley  - Global care per primary     Vascular Surgery  [id number]   73M w/ PMHx hypertension, ESRD on HD via R radiocephalic fistula (MWF), DM, HTN, p/w hiccups and peaked T waves, admitted to MICU for c/f baclofen toxicity. Patient received 1x HD on admission. R femoral shiley removed 5/2, had 2 RRT for AMS and failed HD via AVF 5/3. S/p emergent R IJ shiley placement 5/3, started CRRT which is now transitioned back to iHD. Vascular planning RUE fistulogram when medically optimized. Extubated to HFNC then reintubated 5/12 for c/f aspiration w/ hypoxic resp failure. S/p trach 5/14. Downgraded from MICU to RCU 5/16.    Recommendations:   - Planning for fistulogram tomorrow  - 4 am labs (cbc, bmp, coags, type and screen)  - HOLD tube feeds at midnight  - hold heparin gtt on call to the OR  - hep gtt for nonocclusive R common iliac DVT  - Consent signed by wife, in chart  - Med/cards/ID optimization, please make sure risk stratification documented  - Continue HD via replaced R IJ shiley  - Global care per primary     Vascular Surgery  [id number]

## 2024-05-22 NOTE — PROGRESS NOTE ADULT - SUBJECTIVE AND OBJECTIVE BOX
Patient is a 73y old  Male who presents with a chief complaint of Unstable angina, abn EKG (22 May 2024 13:05)      SUBJECTIVE / OVERNIGHT EVENTS: tolerating heparin drip, on trache, vented, RUE fistulogram in am, on Zosyn 2/2 presumed aspiration PNA since 5/18    MEDICATIONS  (STANDING):  albuterol/ipratropium for Nebulization 3 milliLiter(s) Nebulizer every 12 hours  aspirin  chewable 81 milliGRAM(s) Oral daily  atorvastatin 20 milliGRAM(s) Oral at bedtime  carvedilol 12.5 milliGRAM(s) Oral every 12 hours  chlorhexidine 0.12% Liquid 15 milliLiter(s) Oral Mucosa every 12 hours  chlorhexidine 2% Cloths 1 Application(s) Topical <User Schedule>  dextrose 10% Bolus 125 milliLiter(s) IV Bolus once  dextrose 5%. 1000 milliLiter(s) (50 mL/Hr) IV Continuous <Continuous>  dextrose 5%. 1000 milliLiter(s) (100 mL/Hr) IV Continuous <Continuous>  dextrose 50% Injectable 12.5 Gram(s) IV Push once  dextrose 50% Injectable 25 Gram(s) IV Push once  epoetin reilly (EPOGEN) Injectable 75060 Unit(s) IV Push <User Schedule>  ferrous    sulfate Liquid 300 milliGRAM(s) Enteral Tube daily  glucagon  Injectable 1 milliGRAM(s) IntraMuscular once  heparin  Infusion. 1100 Unit(s)/Hr (11 mL/Hr) IV Continuous <Continuous>  hydrALAZINE 100 milliGRAM(s) Oral three times a day  insulin lispro (ADMELOG) corrective regimen sliding scale   SubCutaneous every 6 hours  insulin NPH human recombinant 4 Unit(s) SubCutaneous every 6 hours  piperacillin/tazobactam IVPB.. 3.375 Gram(s) IV Intermittent every 12 hours  sodium bicarbonate 650 milliGRAM(s) Oral every 8 hours  sodium chloride 3%  Inhalation 4 milliLiter(s) Inhalation every 12 hours    MEDICATIONS  (PRN):  acetaminophen   Oral Liquid .. 650 milliGRAM(s) Oral every 6 hours PRN Temp greater or equal to 38C (100.4F), Moderate Pain (4 - 6)  dextrose Oral Gel 15 Gram(s) Oral once PRN Blood Glucose LESS THAN 70 milliGRAM(s)/deciliter  melatonin 3 milliGRAM(s) Oral at bedtime PRN Insomnia  sodium chloride 0.9% lock flush 10 milliLiter(s) IV Push every 1 hour PRN Pre/post blood products, medications, blood draw, and to maintain line patency      Vital Signs Last 24 Hrs  T(F): 98.9 (05-22-24 @ 21:00), Max: 99.2 (05-22-24 @ 16:18)  HR: 62 (05-22-24 @ 21:00) (56 - 64)  BP: 109/69 (05-22-24 @ 21:00) (109/69 - 191/56)  RR: 19 (05-22-24 @ 21:00) (18 - 21)  SpO2: 100% (05-22-24 @ 21:00) (96% - 100%)  Telemetry:   CAPILLARY BLOOD GLUCOSE      POCT Blood Glucose.: 237 mg/dL (22 May 2024 21:51)  POCT Blood Glucose.: 206 mg/dL (22 May 2024 16:56)  POCT Blood Glucose.: 159 mg/dL (22 May 2024 11:13)  POCT Blood Glucose.: 291 mg/dL (22 May 2024 05:30)  POCT Blood Glucose.: 217 mg/dL (21 May 2024 23:14)    I&O's Summary    21 May 2024 07:01  -  22 May 2024 07:00  --------------------------------------------------------  IN: 672 mL / OUT: 0 mL / NET: 672 mL    22 May 2024 07:01  -  22 May 2024 22:18  --------------------------------------------------------  IN: 1172 mL / OUT: 2000 mL / NET: -828 mL        PHYSICAL EXAM:  GENERAL: NAD, well-developed  HEAD:  Atraumatic, Normocephalic  EYES: EOMI, PERRLA, conjunctiva and sclera clear  NECK: Supple, No JVD  CHEST/LUNG: Clear to auscultation bilaterally; No wheeze  HEART: Regular rate and rhythm; No murmurs, rubs, or gallops  ABDOMEN: Soft, Nontender, Nondistended; Bowel sounds present  EXTREMITIES:  2+ Peripheral Pulses, No clubbing, cyanosis, or edema  PSYCH: AAOx3  NEUROLOGY: non-focal  SKIN: No rashes or lesions    LABS:                        7.3    11.06 )-----------( 405      ( 22 May 2024 07:25 )             21.6     05-22    134<L>  |  99  |  43<H>  ----------------------------<  209<H>  4.2   |  21<L>  |  4.22<H>    Ca    6.5<LL>      22 May 2024 05:30  Phos  1.3     05-22  Mg     2.20     05-22    TPro  x   /  Alb  1.9<L>  /  TBili  x   /  DBili  x   /  AST  x   /  ALT  x   /  AlkPhos  x   05-22    PTT - ( 22 May 2024 05:30 )  PTT:67.0 sec      Urinalysis Basic - ( 22 May 2024 05:30 )    Color: x / Appearance: x / SG: x / pH: x  Gluc: 209 mg/dL / Ketone: x  / Bili: x / Urobili: x   Blood: x / Protein: x / Nitrite: x   Leuk Esterase: x / RBC: x / WBC x   Sq Epi: x / Non Sq Epi: x / Bacteria: x        RADIOLOGY & ADDITIONAL TESTS:    Imaging Personally Reviewed:    Consultant(s) Notes Reviewed:      Care Discussed with Consultants/Other Providers:

## 2024-05-22 NOTE — PROGRESS NOTE ADULT - SUBJECTIVE AND OBJECTIVE BOX
Neurology Progress Note    S: Patient seen and examined, daughter at bedside    Medications: MEDICATIONS  (STANDING):  albuterol/ipratropium for Nebulization 3 milliLiter(s) Nebulizer every 12 hours  aspirin  chewable 81 milliGRAM(s) Oral daily  atorvastatin 20 milliGRAM(s) Oral at bedtime  carvedilol 12.5 milliGRAM(s) Oral every 12 hours  chlorhexidine 0.12% Liquid 15 milliLiter(s) Oral Mucosa every 12 hours  chlorhexidine 2% Cloths 1 Application(s) Topical <User Schedule>  dextrose 10% Bolus 125 milliLiter(s) IV Bolus once  dextrose 5%. 1000 milliLiter(s) (50 mL/Hr) IV Continuous <Continuous>  dextrose 5%. 1000 milliLiter(s) (100 mL/Hr) IV Continuous <Continuous>  dextrose 50% Injectable 12.5 Gram(s) IV Push once  dextrose 50% Injectable 25 Gram(s) IV Push once  epoetin reilly (EPOGEN) Injectable 02818 Unit(s) IV Push <User Schedule>  ferrous    sulfate Liquid 300 milliGRAM(s) Enteral Tube daily  glucagon  Injectable 1 milliGRAM(s) IntraMuscular once  heparin  Infusion. 1100 Unit(s)/Hr (11 mL/Hr) IV Continuous <Continuous>  hydrALAZINE 100 milliGRAM(s) Oral three times a day  insulin lispro (ADMELOG) corrective regimen sliding scale   SubCutaneous every 6 hours  insulin NPH human recombinant 4 Unit(s) SubCutaneous every 6 hours  piperacillin/tazobactam IVPB.. 3.375 Gram(s) IV Intermittent every 12 hours  sodium bicarbonate 650 milliGRAM(s) Oral every 8 hours  sodium chloride 3%  Inhalation 4 milliLiter(s) Inhalation every 12 hours    MEDICATIONS  (PRN):  acetaminophen   Oral Liquid .. 650 milliGRAM(s) Oral every 6 hours PRN Temp greater or equal to 38C (100.4F), Moderate Pain (4 - 6)  dextrose Oral Gel 15 Gram(s) Oral once PRN Blood Glucose LESS THAN 70 milliGRAM(s)/deciliter  melatonin 3 milliGRAM(s) Oral at bedtime PRN Insomnia  sodium chloride 0.9% lock flush 10 milliLiter(s) IV Push every 1 hour PRN Pre/post blood products, medications, blood draw, and to maintain line patency       Vitals:  Vital Signs Last 24 Hrs  T(C): 37.3 (22 May 2024 16:18), Max: 37.3 (22 May 2024 16:18)  T(F): 99.2 (22 May 2024 16:18), Max: 99.2 (22 May 2024 16:18)  HR: 60 (22 May 2024 16:18) (56 - 64)  BP: 122/40 (22 May 2024 16:18) (122/40 - 191/56)  BP(mean): 79 (22 May 2024 04:00) (79 - 93)  RR: 19 (22 May 2024 16:18) (18 - 21)  SpO2: 100% (22 May 2024 16:18) (96% - 100%)    Parameters below as of 22 May 2024 16:18  Patient On (Oxygen Delivery Method): ventilator    O2 Concentration (%): 30              General Exam:   General Appearance: + trach  Head: Normocephalic, atraumatic and no dysmorphic features  Ear, Nose, and Throat: Moist mucous membranes  CVS: S1S2+  Resp: mechanical  Abd: soft NTND  Extremities: No edema, no cyanosis  Skin: No bruises, no rashes     Neurological Exam:  Mental Status: Opens to voice, tracks, follows simple commands. Mouths words  Cranial Nerves: pupils 1-2mm, R facial palsy with smile  Motor: normal tone, RUE and RLE drift.  Sensation:  intact      I personally reviewed the below data/images/labs:    LABS:                          7.3    11.06 )-----------( 405      ( 22 May 2024 07:25 )             21.6     05-22    134<L>  |  99  |  43<H>  ----------------------------<  209<H>  4.2   |  21<L>  |  4.22<H>    Ca    6.5<LL>      22 May 2024 05:30  Phos  1.3     05-22  Mg     2.20     05-22    TPro  x   /  Alb  1.9<L>  /  TBili  x   /  DBili  x   /  AST  x   /  ALT  x   /  AlkPhos  x   05-22    LIVER FUNCTIONS - ( 22 May 2024 05:30 )  Alb: 1.9 g/dL / Pro: x     / ALK PHOS: x     / ALT: x     / AST: x     / GGT: x           PTT - ( 22 May 2024 05:30 )  PTT:67.0 sec  Urinalysis Basic - ( 22 May 2024 05:30 )    Color: x / Appearance: x / SG: x / pH: x  Gluc: 209 mg/dL / Ketone: x  / Bili: x / Urobili: x   Blood: x / Protein: x / Nitrite: x   Leuk Esterase: x / RBC: x / WBC x   Sq Epi: x / Non Sq Epi: x / Bacteria: x          CT Head No Cont:  (01 May 2024 18:11)  < from: CT Head No Cont (05.01.24 @ 18:11) >    ACC: [id number] EXAM:  CT BRAIN   ORDERED BY: SHELBY MOREL     PROCEDURE DATE:  05/01/2024          INTERPRETATION:  CT OF THE HEAD WITHOUT CONTRAST    CLINICAL INDICATION: Lethargy.    TECHNIQUE: Volumetric CT acquisition was performed through the brain and   reviewed using brain and bone window technique.      COMPARISON: CT head from 1/17/2024    FINDINGS:    The ventricular and sulcal size and configuration is age appropriate.     There is no acute loss of gray-white differentiation. Stable bilateral   inferior frontal encephalomalacia and gliosis likely related to remote   trauma.    There is no evidence of mass effect, midline shift, acute intracranial   hemorrhage, or extra-axial collections.     The calvarium is intact. The paranasalsinuses are clear.The mastoid air   cells are predominantly clear. The orbits are unremarkable.      IMPRESSION:  No acute intracranial hemorrhage or acute territorial infarction. Chronic   findings as above.    --- End of Report ---          GILDARDO KHAN; Resident Radiologist  This document has been electronically signed.  LADARIUS DENNY MD; Attending Radiologist  This document has been electronically signed. May  1 2024  7:54PM    < end of copied text >      EEG Classification / Summary:  Abnormal EEG in the awake, drowsy states.   -Generalized sharp waves with triphasic morphology, initially appearing as frequent GPDs at 1-1.5 Hz, decreasing in prevalence later in recording.  -Variable moderate to mild diffuse slowing.  -No electrographic seizures are captured.     Clinical Impression:  -Generalized sharp waves with triphasic morphology can be seen in toxic-metabolic encephalopathies and indicate some risk of generalized seizures, especially when at higher frequencies than in this study. These decrease in prevalence over the course of recording.  -Variable moderate to mild diffuse cerebral dysfunction is nonspecific in etiology.   -No seizures x2 days of recording.    m< from: MR Head w/wo IV Cont (05.06.24 @ 18:10) >    ACC: [id number] EXAM:  MR BRAIN WAW IC   ORDERED BY: DOLORES QUICK     PROCEDURE DATE:  05/06/2024          INTERPRETATION:  CLINICAL INDICATION: Altered mental status.    TECHNIQUE: Multi-planar, multi-sequence magnetic resonance imaging of the   brain was performed with and without intravenous contrast.    CONTRAST: Post-contrast MR images were obtained following infusion of 5   mL of Gadavist    COMPARISON: No prior studies are available for comparison    FINDINGS:    VENTRICLES AND SULCI:  Normal.  INTRA-AXIAL: Punctate foci of restricted diffusion in the left talya and   left cerebellum with associated T2 prolongation consistent with acute   infarcts. No acute intracranial hemorrhage. Encephalomalacia/gliosis   noted in the inferior frontal lobes. No abnormal enhancement. There are   patchy and confluent foci of hyperintense T2 signal within the   subcortical and periventricular white matter which are nonspecific but   likely related to chronic microvascular ischemic disease.  EXTRA-AXIAL:  No mass or collection.  VISUALIZED SINUSES: Mild polypoid mucosal thickening. Fluid noted in the   nasopharynx.  VISUALIZED MASTOIDS:  Clear.  CALVARIUM:  Normal.  CAROTID FLOW VOIDS: Normal.  MISCELLANEOUS:  None.    IMPRESSION: PUNCTATE FOCI OF RESTRICTED DIFFUSION IN THE LEFT TALYA AND   LEFT CEREBELLUM WITH ASSOCIATED T2 PROLONGATION CONSISTENT WITH ACUTE   INFARCTS. NO ACUTE INTRACRANIAL HEMORRHAGE. NO AREA OF ABNORMAL   ENHANCEMENT.    < end of copied text >    m< from: MR Angio Head No Cont (05.09.24 @ 15:58) >    ACC: [id number] EXAM:  MR ANGIO NECK WAW IC   ORDERED BY: OMAR NESBITT     ACC: [id number] EXAM:  MR ANGIO BRAIN   ORDERED BY: OMAR NESBITT     PROCEDURE DATE:  05/09/2024          INTERPRETATION:  .    CLINICAL INFORMATION: Stroke workup. Talya/cerebellar stroke.    TECHNIQUE: MRA images through the neck and [place] were obtained   using a combination of 2-D and 3-D time-of-flight acquisition. Post   contrast MR angiography of the neck was also performed. The data was then   reformatted intoa volumetric data set and reviewed as rotational MIP   images. 5 cc's of IV Gadavist was administered without immediate   complication. 2.5 cc's was discarded.    COMPARISON: Prior head CT exam dated 5/1/2024.    FINDINGS:    MRA Neck: There is a standard anatomic configuration to the aortic arch.    The origins of the great vessels appear unremarkable. The bilateral   common carotid arteries and carotid bifurcations appear unremarkable.    The bilateral cervical internal carotid arteries are within normal limits.    The origins of the bilateral vertebral arteries are normal. The bilateral   cervical vertebral arteries are normal in course and caliber.    MRA [place]: The bilateral intracranial internal carotid,   anterior, and middle cerebral arteries appear unremarkable.    The anterior and bilateral posterior communicating arteries are notable.    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 Otherwise, the   bilateral intradural vertebral arteries, vertebrobasilar junction,   basilar artery, and basilar tip appear unremarkable as well as the   bilateral posterior cerebral arteries.    IMPRESSION: No large vessel occlusion or stenosis.    < end of copied text >

## 2024-05-22 NOTE — PROGRESS NOTE ADULT - SUBJECTIVE AND OBJECTIVE BOX
CHIEF COMPLAINT: Patient is a 73y old  Male who presents with a chief complaint of Unstable angina, abn EKG (22 May 2024 08:09)      Interval Events:    REVIEW OF SYSTEMS:  [ ] All other systems negative  [ ] Unable to assess ROS because ________    Mode: AC/ CMV (Assist Control/ Continuous Mandatory Ventilation), RR (machine): 12, TV (machine): 500, FiO2: 30, PEEP: 5, ITime: 0.67, MAP: 9, PIP: 21      OBJECTIVE:  ICU Vital Signs Last 24 Hrs  T(C): 36.4 (22 May 2024 09:48), Max: 37.1 (22 May 2024 00:00)  T(F): 97.6 (22 May 2024 09:48), Max: 98.7 (22 May 2024 00:00)  HR: 58 (22 May 2024 09:48) (56 - 65)  BP: 156/51 (22 May 2024 09:48) (125/53 - 191/56)  BP(mean): 79 (22 May 2024 04:00) (79 - 93)  ABP: --  ABP(mean): --  RR: 20 (22 May 2024 09:48) (15 - 21)  SpO2: 100% (22 May 2024 09:48) (97% - 100%)    O2 Parameters below as of 22 May 2024 09:48  Patient On (Oxygen Delivery Method): ventilator          Mode: AC/ CMV (Assist Control/ Continuous Mandatory Ventilation), RR (machine): 12, TV (machine): 500, FiO2: 30, PEEP: 5, ITime: 0.67, MAP: 9, PIP: 21    05-21 @ 07:01 - 05-22 @ 07:00  --------------------------------------------------------  IN: 672 mL / OUT: 0 mL / NET: 672 mL    05-22 @ 07:01  -  05-22 @ 10:17  --------------------------------------------------------  IN: 500 mL / OUT: 2000 mL / NET: -1500 mL      CAPILLARY BLOOD GLUCOSE      POCT Blood Glucose.: 291 mg/dL (22 May 2024 05:30)      HOSPITAL MEDICATIONS:  MEDICATIONS  (STANDING):  albuterol/ipratropium for Nebulization 3 milliLiter(s) Nebulizer every 12 hours  aspirin  chewable 81 milliGRAM(s) Oral daily  atorvastatin 20 milliGRAM(s) Oral at bedtime  carvedilol 12.5 milliGRAM(s) Oral every 12 hours  chlorhexidine 0.12% Liquid 15 milliLiter(s) Oral Mucosa every 12 hours  chlorhexidine 2% Cloths 1 Application(s) Topical <User Schedule>  dextrose 10% Bolus 125 milliLiter(s) IV Bolus once  dextrose 5%. 1000 milliLiter(s) (50 mL/Hr) IV Continuous <Continuous>  dextrose 5%. 1000 milliLiter(s) (100 mL/Hr) IV Continuous <Continuous>  dextrose 50% Injectable 12.5 Gram(s) IV Push once  dextrose 50% Injectable 25 Gram(s) IV Push once  epoetin reilly (EPOGEN) Injectable 04749 Unit(s) IV Push <User Schedule>  ferrous    sulfate Liquid 300 milliGRAM(s) Enteral Tube daily  glucagon  Injectable 1 milliGRAM(s) IntraMuscular once  heparin  Infusion. 1100 Unit(s)/Hr (11 mL/Hr) IV Continuous <Continuous>  hydrALAZINE 100 milliGRAM(s) Oral three times a day  insulin lispro (ADMELOG) corrective regimen sliding scale   SubCutaneous every 6 hours  insulin NPH human recombinant 4 Unit(s) SubCutaneous every 6 hours  piperacillin/tazobactam IVPB.. 3.375 Gram(s) IV Intermittent every 12 hours  sodium bicarbonate 650 milliGRAM(s) Oral every 8 hours  sodium chloride 3%  Inhalation 4 milliLiter(s) Inhalation every 12 hours    MEDICATIONS  (PRN):  acetaminophen   Oral Liquid .. 650 milliGRAM(s) Oral every 6 hours PRN Temp greater or equal to 38C (100.4F), Moderate Pain (4 - 6)  dextrose Oral Gel 15 Gram(s) Oral once PRN Blood Glucose LESS THAN 70 milliGRAM(s)/deciliter  melatonin 3 milliGRAM(s) Oral at bedtime PRN Insomnia  sodium chloride 0.9% lock flush 10 milliLiter(s) IV Push every 1 hour PRN Pre/post blood products, medications, blood draw, and to maintain line patency      LABS:                        7.3    11.06 )-----------( 405      ( 22 May 2024 07:25 )             21.6     05-22    134<L>  |  99  |  43<H>  ----------------------------<  209<H>  4.2   |  21<L>  |  4.22<H>    Ca    6.5<LL>      22 May 2024 05:30  Phos  1.3     05-22  Mg     2.20     05-22    TPro  x   /  Alb  1.9<L>  /  TBili  x   /  DBili  x   /  AST  x   /  ALT  x   /  AlkPhos  x   05-22    PTT - ( 22 May 2024 05:30 )  PTT:67.0 sec  Urinalysis Basic - ( 22 May 2024 05:30 )    Color: x / Appearance: x / SG: x / pH: x  Gluc: 209 mg/dL / Ketone: x  / Bili: x / Urobili: x   Blood: x / Protein: x / Nitrite: x   Leuk Esterase: x / RBC: x / WBC x   Sq Epi: x / Non Sq Epi: x / Bacteria: x        Venous Blood Gas:  05-20 @ 14:09  7.43/44/153/29/98.4  VBG Lactate: 1.3      PAST MEDICAL & SURGICAL HISTORY:  Diabetes      Benign essential HTN      HLD (hyperlipidemia)      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 Pt tolerated HD well today. He denies any new pain at this time. Vascular surgery and ID following; recs appreciated.                         Will make NPO p MN w plan for Fistulogram w Vascular tomorrow. Plan to complete Abx tomorrow. VSS and medications reviewed.     REVIEW OF SYSTEMS:  [x] All other systems negative    Mode: AC/ CMV (Assist Control/ Continuous Mandatory Ventilation), RR (machine): 12, TV (machine): 500, FiO2: 30, PEEP: 5, ITime: 0.67, MAP: 9, PIP: 21    OBJECTIVE:  ICU Vital Signs Last 24 Hrs  T(C): 36.4 (22 May 2024 09:48), Max: 37.1 (22 May 2024 00:00)  T(F): 97.6 (22 May 2024 09:48), Max: 98.7 (22 May 2024 00:00)  HR: 58 (22 May 2024 09:48) (56 - 65)  BP: 156/51 (22 May 2024 09:48) (125/53 - 191/56)  BP(mean): 79 (22 May 2024 04:00) (79 - 93)  ABP: --  ABP(mean): --  RR: 20 (22 May 2024 09:48) (15 - 21)  SpO2: 100% (22 May 2024 09:48) (97% - 100%)    O2 Parameters below as of 22 May 2024 09:48  Patient On (Oxygen Delivery Method): ventilator    Mode: AC/ CMV (Assist Control/ Continuous Mandatory Ventilation), RR (machine): 12, TV (machine): 500, FiO2: 30, PEEP: 5, ITime: 0.67, MAP: 9, PIP: 21    05-21 @ 07:01  -  05-22 @ 07:00  --------------------------------------------------------  IN: 672 mL / OUT: 0 mL / NET: 672 mL    05-22 @ 07:01 - 05-22 @ 10:17  --------------------------------------------------------  IN: 500 mL / OUT: 2000 mL / NET: -1500 mL    CAPILLARY BLOOD GLUCOSE    POCT Blood Glucose.: 291 mg/dL (22 May 2024 05:30)    HOSPITAL MEDICATIONS:  MEDICATIONS  (STANDING):  albuterol/ipratropium for Nebulization 3 milliLiter(s) Nebulizer every 12 hours  aspirin  chewable 81 milliGRAM(s) Oral daily  atorvastatin 20 milliGRAM(s) Oral at bedtime  carvedilol 12.5 milliGRAM(s) Oral every 12 hours  chlorhexidine 0.12% Liquid 15 milliLiter(s) Oral Mucosa every 12 hours  chlorhexidine 2% Cloths 1 Application(s) Topical <User Schedule>  dextrose 10% Bolus 125 milliLiter(s) IV Bolus once  dextrose 5%. 1000 milliLiter(s) (50 mL/Hr) IV Continuous <Continuous>  dextrose 5%. 1000 milliLiter(s) (100 mL/Hr) IV Continuous <Continuous>  dextrose 50% Injectable 12.5 Gram(s) IV Push once  dextrose 50% Injectable 25 Gram(s) IV Push once  epoetin reilly (EPOGEN) Injectable 12401 Unit(s) IV Push <User Schedule>  ferrous    sulfate Liquid 300 milliGRAM(s) Enteral Tube daily  glucagon  Injectable 1 milliGRAM(s) IntraMuscular once  heparin  Infusion. 1100 Unit(s)/Hr (11 mL/Hr) IV Continuous <Continuous>  hydrALAZINE 100 milliGRAM(s) Oral three times a day  insulin lispro (ADMELOG) corrective regimen sliding scale   SubCutaneous every 6 hours  insulin NPH human recombinant 4 Unit(s) SubCutaneous every 6 hours  piperacillin/tazobactam IVPB.. 3.375 Gram(s) IV Intermittent every 12 hours  sodium bicarbonate 650 milliGRAM(s) Oral every 8 hours  sodium chloride 3%  Inhalation 4 milliLiter(s) Inhalation every 12 hours    MEDICATIONS  (PRN):  acetaminophen   Oral Liquid .. 650 milliGRAM(s) Oral every 6 hours PRN Temp greater or equal to 38C (100.4F), Moderate Pain (4 - 6)  dextrose Oral Gel 15 Gram(s) Oral once PRN Blood Glucose LESS THAN 70 milliGRAM(s)/deciliter  melatonin 3 milliGRAM(s) Oral at bedtime PRN Insomnia  sodium chloride 0.9% lock flush 10 milliLiter(s) IV Push every 1 hour PRN Pre/post blood products, medications, blood draw, and to maintain line patency    PHYSICAL EXAM  General: Laying in bed comfortably, NAD  HEENT: AT/NC, +Trach, area c/d/i, +R IJ Shiley noted, neck supple, no JVD  HEART: +s1, s2   LUNGS: Non labored breathing. +coarse breath sounds noted  GI: +BS, soft, +PEG, area c/d/i, no peritoneal signs noted    MSK: +trace edema b/l lower ext, no swelling   Derm: as per RN assessment sheet   NEURO: Alert and awake, non focal, following commands    LABS:                        7.3    11.06 )-----------( 405      ( 22 May 2024 07:25 )             21.6     05-22    134<L>  |  99  |  43<H>  ----------------------------<  209<H>  4.2   |  21<L>  |  4.22<H>    Ca    6.5<LL>      22 May 2024 05:30  Phos  1.3     05-22  Mg     2.20     05-22    TPro  x   /  Alb  1.9<L>  /  TBili  x   /  DBili  x   /  AST  x   /  ALT  x   /  AlkPhos  x   05-22    PTT - ( 22 May 2024 05:30 )  PTT:67.0 sec  Urinalysis Basic - ( 22 May 2024 05:30 )    Color: x / Appearance: x / SG: x / pH: x  Gluc: 209 mg/dL / Ketone: x  / Bili: x / Urobili: x   Blood: x / Protein: x / Nitrite: x   Leuk Esterase: x / RBC: x / WBC x   Sq Epi: x / Non Sq Epi: x / Bacteria: x    Venous Blood Gas:  05-20 @ 14:09  7.43/44/153/29/98.4  VBG Lactate: 1.3    PAST MEDICAL & SURGICAL HISTORY:  Diabetes    Benign essential HTN    HLD (hyperlipidemia)    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    ESRD on hemodialysis    Arteriovenous fistula    FAMILY HISTORY:  FHx: diabetes mellitus (Father, Aunt)    Social History:    RADIOLOGY:  [ ] Reviewed and interpreted by me    PULMONARY FUNCTION TESTS:    EKG:

## 2024-05-22 NOTE — PROGRESS NOTE ADULT - NS ATTEND AMEND GEN_ALL_CORE FT
Patient is a 72 yo M w/ ESRD on dialysis, HTN and T2DM who was admitted with demand ischemia versus NSTEMI.  Hospital course was complicated by baclofen toxicity requiring intubation and MICU transfer, left talya and cerebellum stroke, aspiration and B cereus bacteremia and RLE DVT. Now s/p Trach 5/16    #Respiratory failure  #Trach dependence  #DVT  #ESRD  - c/w heparin gtt, will hold on call to OR as per Vascular  - c/w mechanical ventilatory support, tolerating PSV 10/5 today  - Completed course of Vanc for B cereus bacteremia  - Complete empiric course of Zosyn x 7 days for recent fever  - Awaiting fistulogram per vascular, planned for tomorrow. Patient is medically optimized for procedure.  - WIll check 4AM labs as per vascular  - HD as per Renal  - Plan for possible Permacath pending fistulogram    Kody Rivas MD  Pulmonary & Critical Care

## 2024-05-22 NOTE — PROGRESS NOTE ADULT - ASSESSMENT
73-year-old male past medical history hypertension, ESRD on dialysis Monday Wednesday Friday, diabetes insulin-dependent presents with hiccups patient endorses persistent hiccups for the last 2 days.   found to have peaked T waves, a new finding. denied dizziness, N/V, denies CP, palps, abd pain  Upon admission seen by CArd, renal and GI  found to have a distended GB prob 2/2 gastroparesis, was started on Reglan  has received 4 doses of baclofen since 4/30 2/2 hiccups, last dose on 5/1 at 5 am  had received Haldol for agitation at 11 pm on 4/30, AMS observed in pm of 5/1  AMS ongoing, RRT x 2 called  now transferred to MICU for encephalopathy requiring  airway protection on 5/2,  intubated for airway protection, was on  propofol and pressors. now off  toxicology consulted upon dx of encephalopathy, not impressed AMS 2/2 Haldol nor baclofen . AMS was 2/2 acute CVA   HD was not done timely until femoral shiley was placed 2/2 clotted RUE AVF. now removed and RIJ shiley placed on 5/3  has been nonverbal since pm 5/1.     MR brain 5/6 c/w L pontine and L cerebellar acute infarcts, no hemorrhage . as per neuro: embolic in nature.   encephalopathy probably 2/2 acute CVA, now follows commands appropriately off sedation.   no SZ focus on EEG,   off CRRT,  HD resumed as per renal.   remains intubated, now trached  off keppra  AC resumed, on Heparin drip for R common iliac DVT  completed 5 days of empiric ZOSYN on 5/7, shortly after became septic again after an extubation trial. bacteremia w B. cereus, on Vanc through 5/20 as per ID    s/p trache placement by pulm,   IR unable to find a window for G-J tube. PEG placed by surgery 5/17, resume feeds when cleared by surgery  HD via R IJ.  Tmax overnight( 5/18)  101, cxr c/w RLL PNA, now on Zosyn, blood Cx s sent. remains on Vanco for B. cereus bacteremia   leukocytosis resolved  EKG changes on 5/3 c/w NSTEMI loaded w DAPT , was  on Heparin drip x 48 hrs. rpt TTE is unchanged  ID, Neuro , renal, cardiology following.  clotted RUE AVF. fistulogram some time this week w Dr. Lockett,  vascular  HD via RIght IJ Shiley.   LP done, no sign of meningitis.   NEUROLOGY     - CTH without acute findings   - LP done, no acute findings  - MR brain w acute infarcts: L talya and L cerebellum, nonhemorrhagic  - no Sz focus on EEG    CARDIOVASCULAR   - ptn never had CP. just peaked T waves. was awaiting ischemic study w nucl stress test  - TTE showing EF 72%, rpt unchanged  - EKG changes on 5/3, loaded w DAPT now on Heparin drip 2/2 DVT    GI  - PEG placed, npo w tube feeds  - Gastroparesis       RENAL   -  HD as per renal  - via R IJ shiley  -  fistula study of RUE  as per VAscular in am, 5/23  - PAC on 5/24      INFECTIOUS DISEASE     completed a course of Zosyn, then became septic, bacteremic a B. cereus, completed 3 days of Meropenem, completed vanc through 5/21  on Zosyn since 5/18 for RLL PNA, afebrile    ENDOCRINE   - DM  - cw ISS    DVT  - RLE , on Heparin drip    GOC:  Full code

## 2024-05-22 NOTE — PROGRESS NOTE ADULT - SUBJECTIVE AND OBJECTIVE BOX
TEAM [ C ] Surgery Daily Progress Note  =====================================================    SUBJECTIVE: Patient seen and examined at bedside on AM rounds. No acute complaints overnight.      ALLERGIES:  No Known Allergies      --------------------------------------------------------------------------------------    MEDICATIONS:    Neurologic Medications  acetaminophen   Oral Liquid .. 650 milliGRAM(s) Oral every 6 hours PRN Temp greater or equal to 38C (100.4F), Moderate Pain (4 - 6)  melatonin 3 milliGRAM(s) Oral at bedtime PRN Insomnia    Respiratory Medications  albuterol/ipratropium for Nebulization 3 milliLiter(s) Nebulizer every 12 hours  sodium chloride 3%  Inhalation 4 milliLiter(s) Inhalation every 12 hours    Cardiovascular Medications  carvedilol 12.5 milliGRAM(s) Oral every 12 hours  hydrALAZINE 100 milliGRAM(s) Oral three times a day    Gastrointestinal Medications  dextrose 10% Bolus 125 milliLiter(s) IV Bolus once  dextrose 5%. 1000 milliLiter(s) IV Continuous <Continuous>  dextrose 5%. 1000 milliLiter(s) IV Continuous <Continuous>  ferrous    sulfate Liquid 300 milliGRAM(s) Enteral Tube daily  sodium bicarbonate 650 milliGRAM(s) Oral every 8 hours  sodium chloride 0.9% lock flush 10 milliLiter(s) IV Push every 1 hour PRN Pre/post blood products, medications, blood draw, and to maintain line patency    Genitourinary Medications    Hematologic/Oncologic Medications  aspirin  chewable 81 milliGRAM(s) Oral daily  epoetin reilly (EPOGEN) Injectable 80582 Unit(s) IV Push <User Schedule>  heparin  Infusion. 1100 Unit(s)/Hr IV Continuous <Continuous>    Antimicrobial/Immunologic Medications  piperacillin/tazobactam IVPB.. 3.375 Gram(s) IV Intermittent every 12 hours    Endocrine/Metabolic Medications  atorvastatin 20 milliGRAM(s) Oral at bedtime  dextrose 50% Injectable 25 Gram(s) IV Push once  dextrose 50% Injectable 12.5 Gram(s) IV Push once  dextrose Oral Gel 15 Gram(s) Oral once PRN Blood Glucose LESS THAN 70 milliGRAM(s)/deciliter  glucagon  Injectable 1 milliGRAM(s) IntraMuscular once  insulin lispro (ADMELOG) corrective regimen sliding scale   SubCutaneous every 6 hours  insulin NPH human recombinant 4 Unit(s) SubCutaneous every 6 hours    Topical/Other Medications  chlorhexidine 0.12% Liquid 15 milliLiter(s) Oral Mucosa every 12 hours  chlorhexidine 2% Cloths 1 Application(s) Topical <User Schedule>    --------------------------------------------------------------------------------------    VITAL SIGNS:  T(C): 36.4 (05-22-24 @ 09:48), Max: 37.1 (05-22-24 @ 00:00)  HR: 57 (05-22-24 @ 10:41) (56 - 65)  BP: 156/51 (05-22-24 @ 09:48) (125/53 - 191/56)  RR: 20 (05-22-24 @ 09:48) (15 - 21)  SpO2: 96% (05-22-24 @ 10:41) (96% - 100%)  --------------------------------------------------------------------------------------    EXAM    General: NAD, resting in bed comfortably.  Cardiac: regular rate, warm and well perfused  Respiratory: trached on vent  Abdomen: soft, nontender, nondistended.  Extremities: : extremities are warm and well perfused, Right AVF w/ palpable thrill, palpable b/l radial pulses,  Right IJ shiley in place    --------------------------------------------------------------------------------------    LABS                        7.3    11.06 )-----------( 405      ( 22 May 2024 07:25 )             21.6   05-22    134<L>  |  99  |  43<H>  ----------------------------<  209<H>  4.2   |  21<L>  |  4.22<H>    Ca    6.5<LL>      22 May 2024 05:30  Phos  1.3     05-22  Mg     2.20     05-22    TPro  x   /  Alb  1.9<L>  /  TBili  x   /  DBili  x   /  AST  x   /  ALT  x   /  AlkPhos  x   05-22    --------------------------------------------------------------------------------------    INS AND OUTS:    05-21-24 @ 07:01  -  05-22-24 @ 07:00  --------------------------------------------------------  IN: 672 mL / OUT: 0 mL / NET: 672 mL    05-22-24 @ 07:01  -  05-22-24 @ 11:01  --------------------------------------------------------  IN: 500 mL / OUT: 2000 mL / NET: -1500 mL      -------------------------------------------------------------------------------------- TEAM [ C ] Surgery Daily Progress Note  =====================================================    SUBJECTIVE: Patient seen and examined at bedside on AM rounds. No acute events overnight. Getting HD this AM.       ALLERGIES:  No Known Allergies      --------------------------------------------------------------------------------------    MEDICATIONS:    Neurologic Medications  acetaminophen   Oral Liquid .. 650 milliGRAM(s) Oral every 6 hours PRN Temp greater or equal to 38C (100.4F), Moderate Pain (4 - 6)  melatonin 3 milliGRAM(s) Oral at bedtime PRN Insomnia    Respiratory Medications  albuterol/ipratropium for Nebulization 3 milliLiter(s) Nebulizer every 12 hours  sodium chloride 3%  Inhalation 4 milliLiter(s) Inhalation every 12 hours    Cardiovascular Medications  carvedilol 12.5 milliGRAM(s) Oral every 12 hours  hydrALAZINE 100 milliGRAM(s) Oral three times a day    Gastrointestinal Medications  dextrose 10% Bolus 125 milliLiter(s) IV Bolus once  dextrose 5%. 1000 milliLiter(s) IV Continuous <Continuous>  dextrose 5%. 1000 milliLiter(s) IV Continuous <Continuous>  ferrous    sulfate Liquid 300 milliGRAM(s) Enteral Tube daily  sodium bicarbonate 650 milliGRAM(s) Oral every 8 hours  sodium chloride 0.9% lock flush 10 milliLiter(s) IV Push every 1 hour PRN Pre/post blood products, medications, blood draw, and to maintain line patency    Genitourinary Medications    Hematologic/Oncologic Medications  aspirin  chewable 81 milliGRAM(s) Oral daily  epoetin reilly (EPOGEN) Injectable 21524 Unit(s) IV Push <User Schedule>  heparin  Infusion. 1100 Unit(s)/Hr IV Continuous <Continuous>    Antimicrobial/Immunologic Medications  piperacillin/tazobactam IVPB.. 3.375 Gram(s) IV Intermittent every 12 hours    Endocrine/Metabolic Medications  atorvastatin 20 milliGRAM(s) Oral at bedtime  dextrose 50% Injectable 25 Gram(s) IV Push once  dextrose 50% Injectable 12.5 Gram(s) IV Push once  dextrose Oral Gel 15 Gram(s) Oral once PRN Blood Glucose LESS THAN 70 milliGRAM(s)/deciliter  glucagon  Injectable 1 milliGRAM(s) IntraMuscular once  insulin lispro (ADMELOG) corrective regimen sliding scale   SubCutaneous every 6 hours  insulin NPH human recombinant 4 Unit(s) SubCutaneous every 6 hours    Topical/Other Medications  chlorhexidine 0.12% Liquid 15 milliLiter(s) Oral Mucosa every 12 hours  chlorhexidine 2% Cloths 1 Application(s) Topical <User Schedule>    --------------------------------------------------------------------------------------    VITAL SIGNS:  T(C): 36.4 (05-22-24 @ 09:48), Max: 37.1 (05-22-24 @ 00:00)  HR: 57 (05-22-24 @ 10:41) (56 - 65)  BP: 156/51 (05-22-24 @ 09:48) (125/53 - 191/56)  RR: 20 (05-22-24 @ 09:48) (15 - 21)  SpO2: 96% (05-22-24 @ 10:41) (96% - 100%)  --------------------------------------------------------------------------------------    EXAM    General: NAD, resting in bed comfortably.  Cardiac: regular rate, warm and well perfused  Respiratory: trached on vent  Abdomen: soft, nontender, nondistended.  Extremities: : extremities are warm and well perfused, Right AVF w/ palpable thrill, palpable b/l radial pulses,  Right IJ shiley in place    --------------------------------------------------------------------------------------    LABS                        7.3    11.06 )-----------( 405      ( 22 May 2024 07:25 )             21.6   05-22    134<L>  |  99  |  43<H>  ----------------------------<  209<H>  4.2   |  21<L>  |  4.22<H>    Ca    6.5<LL>      22 May 2024 05:30  Phos  1.3     05-22  Mg     2.20     05-22    TPro  x   /  Alb  1.9<L>  /  TBili  x   /  DBili  x   /  AST  x   /  ALT  x   /  AlkPhos  x   05-22    --------------------------------------------------------------------------------------    INS AND OUTS:    05-21-24 @ 07:01  -  05-22-24 @ 07:00  --------------------------------------------------------  IN: 672 mL / OUT: 0 mL / NET: 672 mL    05-22-24 @ 07:01  -  05-22-24 @ 11:01  --------------------------------------------------------  IN: 500 mL / OUT: 2000 mL / NET: -1500 mL      --------------------------------------------------------------------------------------

## 2024-05-22 NOTE — PROGRESS NOTE ADULT - ASSESSMENT
73M PMHx HTN, ESRD, IDDM p/w hiccups and started on baclofen with concern for AMS overnight and desaturation, ?cheye nascimento respirations. Labs with numerous metabolic derangements. CTH with bifrontal encephalomalacia, likley traumatic.   WBC inc significantly, now intubated. Exam limited as on propofol, also on pressors.   s/p LP for meningitis concerns however appears bland - not on antibiotics  EEG with GPDs, no seizures  MR brain with punctuate L pontine and L cerebellar infarcts  MRA H/N with mild basilar fenestration, otherwise neg  mental status improving post extubation but now reintubated for recurrent aspiration   s/p trach, neurologically improving  o/e with R hemiparesis, mental status markedly improved    AMS of unclear etiology - ? baclofen toxicity, no evidence of seizures. 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 - embolic appearing  Intractable hiccups  hypoxic resp failure  ESRD on HD  B cereus bacteremia, likely contaminant  Nonocclusive R common iliac DVT    MRI, MRA reviewed, as above - R hemiparesis on exam likely related to known L sided strokes - previously giving poor effort on exam   cont aspirin 81mg  TTE reviewed, no need to repeat for now  Keppra started for GPDs, no improvement in mental status - now better off keppra  continue to monitor off Keppra, avoid baclofen and reglan  s/p trach, peg   HD per nephro  f/u remainder of CSF - negative  s/p Zosyn, Meropenem for pna   Abx per ID  on hep gtt for DVT  Fistulogram planned per vasc sx

## 2024-05-22 NOTE — PROGRESS NOTE ADULT - SUBJECTIVE AND OBJECTIVE BOX
[facility], Division of Infectious Diseases  AUGUST Carbajal Y. Patel, S. Shah, G. Brian  [phone]  ([phone] - weekdays after 5pm and weekends)    Name: JIMMY BLAND  Age/Gender: 73y Male  MRN: [id number]    Interval History:  Notes reviewed.   No concerning overnight events.  Afebrile.     Allergies: No Known Allergies      Objective:  Vitals:   T(F): 97.8 (05-22-24 @ 11:30), Max: 98.7 (05-22-24 @ 00:00)  HR: 60 (05-22-24 @ 11:30) (56 - 64)  BP: 152/59 (05-22-24 @ 11:30) (125/53 - 191/56)  RR: 18 (05-22-24 @ 11:30) (15 - 21)  SpO2: 100% (05-22-24 @ 11:30) (96% - 100%)  Physical Examination:  General: no acute distress  HEENT: anicteric, NGT, tracheostomy, on vent  Lungs: clear to auscultation anteriorly  Heart: S1, S2, normal rate, RIJ shiley  Abdomen: Soft. ND. NT  Extremities: No LE edema.   Skin: Warm. Dry.     Laboratory Studies:  CBC:                       7.3    11.06 )-----------( 405      ( 22 May 2024 07:25 )             21.6     WBC Trend:  11.06 05-22-24 @ 07:25  9.71 05-22-24 @ 05:30  10.27 05-21-24 @ 05:25  9.06 05-20-24 @ 06:31  10.08 05-19-24 @ 05:50  7.35 05-18-24 @ 03:45  7.71 05-17-24 @ 22:30  10.59 05-17-24 @ 06:00  9.46 05-16-24 @ 16:55  10.87 05-16-24 @ 01:00    CMP: 05-22    134<L>  |  99  |  43<H>  ----------------------------<  209<H>  4.2   |  21<L>  |  4.22<H>    Ca    6.5<LL>      22 May 2024 05:30  Phos  1.3     05-22  Mg     2.20     05-22    TPro  x   /  Alb  1.9<L>  /  TBili  x   /  DBili  x   /  AST  x   /  ALT  x   /  AlkPhos  x   05-22      LIVER FUNCTIONS - ( 22 May 2024 05:30 )  Alb: 1.9 g/dL / Pro: x     / ALK PHOS: x     / ALT: x     / AST: x     / GGT: x           Vancomycin Level, Random: 21.8 ug/mL (05-21-24 @ 05:25)  Vancomycin Level, Random: 17.1 ug/mL (05-20-24 @ 06:31)  Vancomycin Level, Random: 18.6 ug/mL (05-19-24 @ 05:50)  Vancomycin Level, Random: 17.5 ug/mL (05-17-24 @ 22:30)    Urinalysis Basic - ( 22 May 2024 05:30 )    Color: x / Appearance: x / SG: x / pH: x  Gluc: 209 mg/dL / Ketone: x  / Bili: x / Urobili: x   Blood: x / Protein: x / Nitrite: x   Leuk Esterase: x / RBC: x / WBC x   Sq Epi: x / Non Sq Epi: x / Bacteria: x      Microbiology: reviewed     Culture - Sputum (collected 05-18-24 @ 04:30)  Source: .Sputum Sputum  Gram Stain (05-18-24 @ 21:05):    Few polymorphonuclear leukocytes per low power field    No Squamous epithelial cells per low power field    No organisms seen per oil power field  Final Report (05-20-24 @ 07:02):    Normal Respiratory Jocelyn present    Culture - Blood (collected 05-18-24 @ 04:05)  Source: .Blood Blood-Venous  Preliminary Report (05-22-24 @ 11:01):    No growth at 4 days    Culture - Blood (collected 05-18-24 @ 03:45)  Source: .Blood Blood-Venous  Preliminary Report (05-22-24 @ 09:01):    No growth at 4 days    Culture - Fungal, Bronchial (collected 05-12-24 @ 15:06)  Source: .Bronchial Bronchial Lavage  Preliminary Report (05-15-24 @ 23:03):    Culture is being performed. Fungal cultures are held for 4 weeks.    Culture - Acid Fast - Bronchial w/Smear (collected 05-12-24 @ 15:06)  Source: .Bronchial Bronchial Lavage  Preliminary Report (05-15-24 @ 23:09):    Culture is being performed.    Culture - Bronchial (collected 05-12-24 @ 15:06)  Source: .Bronchial Bronchial Lavage  Gram Stain (05-12-24 @ 22:40):    Moderate polymorphonuclear leukocytes per low power field    Rare Squamous epithelial cells per low power field    Rare Gram Positive Cocci in Clusters per oil power field  Final Report (05-14-24 @ 08:10):    Normal Respiratory Jocelyn present    Culture - Blood (collected 05-11-24 @ 14:00)  Source: .Blood Blood-Peripheral  Final Report (05-16-24 @ 19:01):    No growth at 5 days    Culture - Blood (collected 05-11-24 @ 14:00)  Source: .Blood Blood-Peripheral  Final Report (05-16-24 @ 19:01):    No growth at 5 days    Culture - Sputum (collected 05-10-24 @ 16:45)  Source: .Sputum Sputum  Gram Stain (05-10-24 @ 22:57):    Rare polymorphonuclear leukocytes per low power field    Rare Squamous epithelial cells per low power field    Rare Gram Negative Rods per oil power field  Final Report (05-12-24 @ 16:49):    Normal Respiratory Jocelyn present    Culture - Blood (collected 05-10-24 @ 04:00)  Source: .Blood Blood-Peripheral  Final Report (05-15-24 @ 07:01):    No growth at 5 days    Culture - Blood (collected 05-10-24 @ 03:45)  Source: .Blood Blood-Peripheral  Gram Stain (05-10-24 @ 19:34):    Growth in anaerobic bottle: Gram Positive Rods  Final Report (05-11-24 @ 14:47):    Growth in anaerobic bottle: Bacillus cereus "Susceptibilities not    performed"    Direct identification is available within approximately 3-5    hours either by Blood Panel Multiplexed PCR or Direct    MALDI-TOF. Details: [email or web address]  Organism: Blood Culture PCR (05-11-24 @ 14:47)  Organism: Blood Culture PCR (05-11-24 @ 14:47)      Method Type: PCR      -  Blood PCR Panel: NEG        Radiology: reviewed     Medications:  acetaminophen   Oral Liquid .. 650 milliGRAM(s) Oral every 6 hours PRN  albuterol/ipratropium for Nebulization 3 milliLiter(s) Nebulizer every 12 hours  aspirin  chewable 81 milliGRAM(s) Oral daily  atorvastatin 20 milliGRAM(s) Oral at bedtime  carvedilol 12.5 milliGRAM(s) Oral every 12 hours  chlorhexidine 0.12% Liquid 15 milliLiter(s) Oral Mucosa every 12 hours  chlorhexidine 2% Cloths 1 Application(s) Topical <User Schedule>  dextrose 10% Bolus 125 milliLiter(s) IV Bolus once  dextrose 5%. 1000 milliLiter(s) IV Continuous <Continuous>  dextrose 5%. 1000 milliLiter(s) IV Continuous <Continuous>  dextrose 50% Injectable 12.5 Gram(s) IV Push once  dextrose 50% Injectable 25 Gram(s) IV Push once  dextrose Oral Gel 15 Gram(s) Oral once PRN  epoetin reilly (EPOGEN) Injectable 27735 Unit(s) IV Push <User Schedule>  ferrous    sulfate Liquid 300 milliGRAM(s) Enteral Tube daily  glucagon  Injectable 1 milliGRAM(s) IntraMuscular once  heparin  Infusion. 1100 Unit(s)/Hr IV Continuous <Continuous>  hydrALAZINE 100 milliGRAM(s) Oral three times a day  insulin lispro (ADMELOG) corrective regimen sliding scale   SubCutaneous every 6 hours  insulin NPH human recombinant 4 Unit(s) SubCutaneous every 6 hours  melatonin 3 milliGRAM(s) Oral at bedtime PRN  piperacillin/tazobactam IVPB.. 3.375 Gram(s) IV Intermittent every 12 hours  sodium bicarbonate 650 milliGRAM(s) Oral every 8 hours  sodium chloride 0.9% lock flush 10 milliLiter(s) IV Push every 1 hour PRN  sodium chloride 3%  Inhalation 4 milliLiter(s) Inhalation every 12 hours    Antimicrobials:  piperacillin/tazobactam IVPB.. 3.375 Gram(s) IV Intermittent every 12 hours

## 2024-05-22 NOTE — PROGRESS NOTE ADULT - SUBJECTIVE AND OBJECTIVE BOX
[facility] [place] [facility]   MD ELIANE BHAGAT MD MARIA SANTIAGO, NP    TEL:  OFFICE: [phone]  From 5pm-7am Answering Service [phone]    -- RENAL FOLLOW UP NOTE ---Date of Service 05-22-24 @ 13:05    Patient is a 73y old  Male who presents with a chief complaint of Unstable angina, abn EKG       Patient seen and examined at bedside. Patient is trach to vent in no apparent distress.    VITALS:  T(F): 97.8 (05-22-24 @ 11:30), Max: 98.7 (05-22-24 @ 00:00)  HR: 60 (05-22-24 @ 11:30)  BP: 152/59 (05-22-24 @ 11:30)  RR: 18 (05-22-24 @ 11:30)  SpO2: 100% (05-22-24 @ 11:30)  Wt(kg): --    05-21 @ 07:01  -  05-22 @ 07:00  --------------------------------------------------------  IN: 672 mL / OUT: 0 mL / NET: 672 mL    05-22 @ 07:01  -  05-22 @ 13:05  --------------------------------------------------------  IN: 500 mL / OUT: 2000 mL / NET: -1500 mL          PHYSICAL EXAM:  General: NAD  Neck: No JVD  Respiratory: trach to vent;   Cardiovascular: S1, S2, RRR  Gastrointestinal: BS+, soft, NT/ND  Extremities: No peripheral edema    Hospital Medications:   MEDICATIONS  (STANDING):  albuterol/ipratropium for Nebulization 3 milliLiter(s) Nebulizer every 12 hours  aspirin  chewable 81 milliGRAM(s) Oral daily  atorvastatin 20 milliGRAM(s) Oral at bedtime  carvedilol 12.5 milliGRAM(s) Oral every 12 hours  chlorhexidine 0.12% Liquid 15 milliLiter(s) Oral Mucosa every 12 hours  chlorhexidine 2% Cloths 1 Application(s) Topical <User Schedule>  dextrose 10% Bolus 125 milliLiter(s) IV Bolus once  dextrose 5%. 1000 milliLiter(s) (100 mL/Hr) IV Continuous <Continuous>  dextrose 5%. 1000 milliLiter(s) (50 mL/Hr) IV Continuous <Continuous>  dextrose 50% Injectable 12.5 Gram(s) IV Push once  dextrose 50% Injectable 25 Gram(s) IV Push once  epoetin reilly (EPOGEN) Injectable 82069 Unit(s) IV Push <User Schedule>  ferrous    sulfate Liquid 300 milliGRAM(s) Enteral Tube daily  glucagon  Injectable 1 milliGRAM(s) IntraMuscular once  heparin  Infusion. 1100 Unit(s)/Hr (11 mL/Hr) IV Continuous <Continuous>  hydrALAZINE 100 milliGRAM(s) Oral three times a day  insulin lispro (ADMELOG) corrective regimen sliding scale   SubCutaneous every 6 hours  insulin NPH human recombinant 4 Unit(s) SubCutaneous every 6 hours  piperacillin/tazobactam IVPB.. 3.375 Gram(s) IV Intermittent every 12 hours  sodium bicarbonate 650 milliGRAM(s) Oral every 8 hours  sodium chloride 3%  Inhalation 4 milliLiter(s) Inhalation every 12 hours      LABS:  05-22    134<L>  |  99  |  43<H>  ----------------------------<  209<H>  4.2   |  21<L>  |  4.22<H>    Ca    6.5<LL>      22 May 2024 05:30  Phos  1.3     05-22  Mg     2.20     05-22    TPro      /  Alb  1.9<L>  /  TBili      /  DBili      /  AST      /  ALT      /  AlkPhos      05-22    Creatinine Trend: 4.22 <--, 3.58 <--, 6.25 <--, 5.08 <--, 3.64 <--, 6.17 <--, 4.55 <--    Phosphorus: 1.3 mg/dL (05-22 @ 05:30)  Albumin: 1.9 g/dL (05-22 @ 05:30)                              7.3    11.06 )-----------( 405      ( 22 May 2024 07:25 )             21.6     Urine Studies:  Urinalysis - [05-22-24 @ 05:30]      Color  / Appearance  / SG  / pH       Gluc 209 / Ketone   / Bili  / Urobili        Blood  / Protein  / Leuk Est  / Nitrite       RBC  / WBC  / Hyaline  / Gran  / Sq Epi  / Non Sq Epi  / Bacteria       Iron 16, TIBC 121, %sat 13      [05-17-24 @ 06:00]  Ferritin 720      [05-17-24 @ 06:00]  TSH 2.60      [05-17-24 @ 06:00]  Lipid: chol 86, , HDL 27, LDL --      [05-17-24 @ 06:00]    HBsAb <3.0      [05-01-24 @ 14:42]  HBcAb Nonreact      [05-01-24 @ 14:42]      RADIOLOGY & ADDITIONAL STUDIES:

## 2024-05-22 NOTE — PROGRESS NOTE ADULT - SUBJECTIVE AND OBJECTIVE BOX
Cardiovascular Disease Progress Note  Date of service: 05-22-24 @ 08:09    Overnight events: No acute events overnight.  Patient is in no distress. Trach in place. Currently receiving HD  Otherwise review of systems negative    Objective Findings:  T(C): 36.8 (05-22-24 @ 07:30), Max: 37.1 (05-22-24 @ 00:00)  HR: 56 (05-22-24 @ 07:37) (56 - 74)  BP: 191/54 (05-22-24 @ 07:30) (125/53 - 191/54)  RR: 20 (05-22-24 @ 07:30) (15 - 21)  SpO2: 100% (05-22-24 @ 07:37) (97% - 100%)  Wt(kg): --  Daily     Daily       Physical Exam:  Gen: NAD; Patient resting comfortably  HEENT: EOMI, Normocephalic/ atraumatic  CV: RRR, normal S1 + S2, no m/r/g  Lungs:  trach  Abd: soft, non-tender; bowel sounds present  Ext: No edema; warm and well perfused    Telemetry: Sinus     Laboratory Data:                        6.8    9.71  )-----------( 367      ( 22 May 2024 05:30 )             20.2     05-22    134<L>  |  99  |  43<H>  ----------------------------<  209<H>  4.2   |  21<L>  |  4.22<H>    Ca    6.5<LL>      22 May 2024 05:30  Phos  1.3     05-22  Mg     2.20     05-22    TPro  x   /  Alb  1.9<L>  /  TBili  x   /  DBili  x   /  AST  x   /  ALT  x   /  AlkPhos  x   05-22    PTT - ( 22 May 2024 05:30 )  PTT:67.0 sec          Inpatient Medications:  MEDICATIONS  (STANDING):  albuterol/ipratropium for Nebulization 3 milliLiter(s) Nebulizer every 12 hours  aspirin  chewable 81 milliGRAM(s) Oral daily  atorvastatin 20 milliGRAM(s) Oral at bedtime  carvedilol 12.5 milliGRAM(s) Oral every 12 hours  chlorhexidine 0.12% Liquid 15 milliLiter(s) Oral Mucosa every 12 hours  chlorhexidine 2% Cloths 1 Application(s) Topical <User Schedule>  dextrose 10% Bolus 125 milliLiter(s) IV Bolus once  dextrose 5%. 1000 milliLiter(s) (50 mL/Hr) IV Continuous <Continuous>  dextrose 5%. 1000 milliLiter(s) (100 mL/Hr) IV Continuous <Continuous>  dextrose 50% Injectable 12.5 Gram(s) IV Push once  dextrose 50% Injectable 25 Gram(s) IV Push once  epoetin reilly (EPOGEN) Injectable 27870 Unit(s) IV Push <User Schedule>  ferrous    sulfate Liquid 300 milliGRAM(s) Enteral Tube daily  glucagon  Injectable 1 milliGRAM(s) IntraMuscular once  heparin  Infusion. 1100 Unit(s)/Hr (11 mL/Hr) IV Continuous <Continuous>  hydrALAZINE 100 milliGRAM(s) Oral three times a day  insulin lispro (ADMELOG) corrective regimen sliding scale   SubCutaneous every 6 hours  insulin NPH human recombinant 4 Unit(s) SubCutaneous every 6 hours  piperacillin/tazobactam IVPB.. 3.375 Gram(s) IV Intermittent every 12 hours  sodium bicarbonate 650 milliGRAM(s) Oral every 8 hours  sodium chloride 3%  Inhalation 4 milliLiter(s) Inhalation every 12 hours      Assessment:  73 year old man with HTN, HLD, T2DM on insulin, and ESRD on HD presents with supply demand ischemia and angina.    Plan of Care:    #Type II myocardial infarction-  Secondary to distributive shock earlier on admission.   The repeat echo shows no new wall motion abnormality.   I would not pursue cath at this time given recurrent aspiration and chronic respiratory failure s/p trach 5/14.   The patient is optimized from a cardiac standpoint to proceed with fistulogram. Tentative plan for Friday       #Sinus bradycardia-  No evidence of AV block on admission EKG or telemetry.  No indication for pacing at this time.   - Continue Coreg with holding parameters.     #HTN-  BP labile.  Continue current regimen  Monitor trends.     #ESRD-  HD as per renal     #DVT   - R common Iliac DVT  - Hep GTT  - AC management as per vascular team.    #ACP (advance care planning)-  Advanced care planning was discussed. 30 additional minutes spent addressing advance care plans.        Over 55 minutes spent on total encounter; more than 50% of the visit was spent counseling and/or coordinating care by the attending physician.      Tico Bravo DO [facility]  Cardiovascular Disease  [phone]

## 2024-05-23 LAB
ANION GAP SERPL CALC-SCNC: 13 MMOL/L — SIGNIFICANT CHANGE UP (ref 7–14)
APTT BLD: 95.7 SEC — HIGH (ref 24.5–35.6)
BLD GP AB SCN SERPL QL: NEGATIVE — SIGNIFICANT CHANGE UP
BUN SERPL-MCNC: 28 MG/DL — HIGH (ref 7–23)
CALCIUM SERPL-MCNC: 8.1 MG/DL — LOW (ref 8.4–10.5)
CHLORIDE SERPL-SCNC: 95 MMOL/L — LOW (ref 98–107)
CO2 SERPL-SCNC: 27 MMOL/L — SIGNIFICANT CHANGE UP (ref 22–31)
CREAT SERPL-MCNC: 3.29 MG/DL — HIGH (ref 0.5–1.3)
CULTURE RESULTS: SIGNIFICANT CHANGE UP
CULTURE RESULTS: SIGNIFICANT CHANGE UP
EGFR: 19 ML/MIN/1.73M2 — LOW
GLUCOSE BLDC GLUCOMTR-MCNC: 112 MG/DL — HIGH (ref 70–99)
GLUCOSE BLDC GLUCOMTR-MCNC: 124 MG/DL — HIGH (ref 70–99)
GLUCOSE BLDC GLUCOMTR-MCNC: 156 MG/DL — HIGH (ref 70–99)
GLUCOSE BLDC GLUCOMTR-MCNC: 222 MG/DL — HIGH (ref 70–99)
GLUCOSE SERPL-MCNC: 203 MG/DL — HIGH (ref 70–99)
HCT VFR BLD CALC: 25.7 % — LOW (ref 39–50)
HGB BLD-MCNC: 8.2 G/DL — LOW (ref 13–17)
INR BLD: 1.19 RATIO — HIGH (ref 0.85–1.18)
MAGNESIUM SERPL-MCNC: 2.3 MG/DL — SIGNIFICANT CHANGE UP (ref 1.6–2.6)
MCHC RBC-ENTMCNC: 20.8 PG — LOW (ref 27–34)
MCHC RBC-ENTMCNC: 31.9 GM/DL — LOW (ref 32–36)
MCV RBC AUTO: 65.2 FL — LOW (ref 80–100)
NRBC # BLD: 1 /100 WBCS — HIGH (ref 0–0)
NRBC # FLD: 0.14 K/UL — HIGH (ref 0–0)
PHOSPHATE SERPL-MCNC: 1.9 MG/DL — LOW (ref 2.5–4.5)
PLATELET # BLD AUTO: 426 K/UL — HIGH (ref 150–400)
POTASSIUM SERPL-MCNC: 4 MMOL/L — SIGNIFICANT CHANGE UP (ref 3.5–5.3)
POTASSIUM SERPL-SCNC: 4 MMOL/L — SIGNIFICANT CHANGE UP (ref 3.5–5.3)
PROTHROM AB SERPL-ACNC: 13.4 SEC — HIGH (ref 9.5–13)
RBC # BLD: 3.94 M/UL — LOW (ref 4.2–5.8)
RBC # FLD: 20.7 % — HIGH (ref 10.3–14.5)
RH IG SCN BLD-IMP: POSITIVE — SIGNIFICANT CHANGE UP
SODIUM SERPL-SCNC: 135 MMOL/L — SIGNIFICANT CHANGE UP (ref 135–145)
SPECIMEN SOURCE: SIGNIFICANT CHANGE UP
SPECIMEN SOURCE: SIGNIFICANT CHANGE UP
WBC # BLD: 11.06 K/UL — HIGH (ref 3.8–10.5)
WBC # FLD AUTO: 11.06 K/UL — HIGH (ref 3.8–10.5)

## 2024-05-23 PROCEDURE — 99233 SBSQ HOSP IP/OBS HIGH 50: CPT

## 2024-05-23 RX ORDER — SODIUM,POTASSIUM PHOSPHATES 278-250MG
1 POWDER IN PACKET (EA) ORAL
Refills: 0 | Status: COMPLETED | OUTPATIENT
Start: 2024-05-23 | End: 2024-05-23

## 2024-05-23 RX ORDER — HYDRALAZINE HCL 50 MG
10 TABLET ORAL ONCE
Refills: 0 | Status: COMPLETED | OUTPATIENT
Start: 2024-05-23 | End: 2024-05-23

## 2024-05-23 RX ADMIN — Medication 100 MILLIGRAM(S): at 11:03

## 2024-05-23 RX ADMIN — CHLORHEXIDINE GLUCONATE 15 MILLILITER(S): 213 SOLUTION TOPICAL at 17:16

## 2024-05-23 RX ADMIN — CARVEDILOL PHOSPHATE 12.5 MILLIGRAM(S): 80 CAPSULE, EXTENDED RELEASE ORAL at 20:59

## 2024-05-23 RX ADMIN — SODIUM CHLORIDE 4 MILLILITER(S): 9 INJECTION INTRAMUSCULAR; INTRAVENOUS; SUBCUTANEOUS at 20:50

## 2024-05-23 RX ADMIN — Medication 1 PACKET(S): at 17:15

## 2024-05-23 RX ADMIN — Medication 81 MILLIGRAM(S): at 11:03

## 2024-05-23 RX ADMIN — Medication 650 MILLIGRAM(S): at 21:00

## 2024-05-23 RX ADMIN — PIPERACILLIN AND TAZOBACTAM 25 GRAM(S): 4; .5 INJECTION, POWDER, LYOPHILIZED, FOR SOLUTION INTRAVENOUS at 17:15

## 2024-05-23 RX ADMIN — Medication 1 PACKET(S): at 10:56

## 2024-05-23 RX ADMIN — Medication 3 MILLILITER(S): at 20:47

## 2024-05-23 RX ADMIN — SODIUM CHLORIDE 4 MILLILITER(S): 9 INJECTION INTRAMUSCULAR; INTRAVENOUS; SUBCUTANEOUS at 08:34

## 2024-05-23 RX ADMIN — Medication 2: at 11:08

## 2024-05-23 RX ADMIN — CHLORHEXIDINE GLUCONATE 15 MILLILITER(S): 213 SOLUTION TOPICAL at 05:19

## 2024-05-23 RX ADMIN — PIPERACILLIN AND TAZOBACTAM 25 GRAM(S): 4; .5 INJECTION, POWDER, LYOPHILIZED, FOR SOLUTION INTRAVENOUS at 05:20

## 2024-05-23 RX ADMIN — Medication 10 MILLIGRAM(S): at 16:51

## 2024-05-23 RX ADMIN — Medication 650 MILLIGRAM(S): at 13:41

## 2024-05-23 RX ADMIN — HEPARIN SODIUM 1100 UNIT(S)/HR: 5000 INJECTION INTRAVENOUS; SUBCUTANEOUS at 01:12

## 2024-05-23 RX ADMIN — Medication 300 MILLIGRAM(S): at 11:05

## 2024-05-23 RX ADMIN — Medication 100 MILLIGRAM(S): at 04:04

## 2024-05-23 RX ADMIN — CARVEDILOL PHOSPHATE 12.5 MILLIGRAM(S): 80 CAPSULE, EXTENDED RELEASE ORAL at 08:16

## 2024-05-23 RX ADMIN — CHLORHEXIDINE GLUCONATE 1 APPLICATION(S): 213 SOLUTION TOPICAL at 05:20

## 2024-05-23 RX ADMIN — HUMAN INSULIN 4 UNIT(S): 100 INJECTION, SUSPENSION SUBCUTANEOUS at 17:16

## 2024-05-23 RX ADMIN — Medication 3 MILLILITER(S): at 08:34

## 2024-05-23 RX ADMIN — Medication 4: at 23:29

## 2024-05-23 RX ADMIN — Medication 650 MILLIGRAM(S): at 05:19

## 2024-05-23 RX ADMIN — ATORVASTATIN CALCIUM 20 MILLIGRAM(S): 80 TABLET, FILM COATED ORAL at 20:59

## 2024-05-23 RX ADMIN — HUMAN INSULIN 4 UNIT(S): 100 INJECTION, SUSPENSION SUBCUTANEOUS at 23:29

## 2024-05-23 RX ADMIN — Medication 100 MILLIGRAM(S): at 21:00

## 2024-05-23 NOTE — PROGRESS NOTE ADULT - SUBJECTIVE AND OBJECTIVE BOX
Neurology Progress Note    S: Patient seen and examined. Planned for fistulogram today    Medications: MEDICATIONS  (STANDING):  albuterol/ipratropium for Nebulization 3 milliLiter(s) Nebulizer every 12 hours  aspirin  chewable 81 milliGRAM(s) Oral daily  atorvastatin 20 milliGRAM(s) Oral at bedtime  carvedilol 12.5 milliGRAM(s) Oral every 12 hours  chlorhexidine 0.12% Liquid 15 milliLiter(s) Oral Mucosa every 12 hours  chlorhexidine 2% Cloths 1 Application(s) Topical <User Schedule>  dextrose 10% Bolus 125 milliLiter(s) IV Bolus once  dextrose 5%. 1000 milliLiter(s) (100 mL/Hr) IV Continuous <Continuous>  dextrose 5%. 1000 milliLiter(s) (50 mL/Hr) IV Continuous <Continuous>  dextrose 50% Injectable 12.5 Gram(s) IV Push once  dextrose 50% Injectable 25 Gram(s) IV Push once  epoetin reilly (EPOGEN) Injectable 94322 Unit(s) IV Push <User Schedule>  ferrous    sulfate Liquid 300 milliGRAM(s) Enteral Tube daily  glucagon  Injectable 1 milliGRAM(s) IntraMuscular once  heparin  Infusion. 1100 Unit(s)/Hr (11 mL/Hr) IV Continuous <Continuous>  hydrALAZINE 100 milliGRAM(s) Oral three times a day  insulin lispro (ADMELOG) corrective regimen sliding scale   SubCutaneous every 6 hours  insulin NPH human recombinant 4 Unit(s) SubCutaneous every 6 hours  piperacillin/tazobactam IVPB.. 3.375 Gram(s) IV Intermittent every 12 hours  potassium phosphate / sodium phosphate Powder (PHOS-NaK) 1 Packet(s) Oral two times a day  sodium bicarbonate 650 milliGRAM(s) Oral every 8 hours  sodium chloride 3%  Inhalation 4 milliLiter(s) Inhalation every 12 hours    MEDICATIONS  (PRN):  acetaminophen   Oral Liquid .. 650 milliGRAM(s) Oral every 6 hours PRN Temp greater or equal to 38C (100.4F), Moderate Pain (4 - 6)  dextrose Oral Gel 15 Gram(s) Oral once PRN Blood Glucose LESS THAN 70 milliGRAM(s)/deciliter  melatonin 3 milliGRAM(s) Oral at bedtime PRN Insomnia  sodium chloride 0.9% lock flush 10 milliLiter(s) IV Push every 1 hour PRN Pre/post blood products, medications, blood draw, and to maintain line patency       Vitals:  Vital Signs Last 24 Hrs  T(C): 37.1 (23 May 2024 08:00), Max: 37.3 (22 May 2024 16:18)  T(F): 98.7 (23 May 2024 08:00), Max: 99.2 (22 May 2024 16:18)  HR: 59 (23 May 2024 10:42) (59 - 64)  BP: 151/86 (23 May 2024 09:41) (109/69 - 183/53)  BP(mean): --  RR: 12 (23 May 2024 08:00) (12 - 19)  SpO2: 99% (23 May 2024 10:42) (98% - 100%)    Parameters below as of 23 May 2024 09:17  Patient On (Oxygen Delivery Method): ventilator          General Exam:   General Appearance: + trach  Head: Normocephalic, atraumatic and no dysmorphic features  Ear, Nose, and Throat: Moist mucous membranes  CVS: S1S2+  Resp: mechanical  Abd: soft NTND  Extremities: No edema, no cyanosis  Skin: No bruises, no rashes     Neurological Exam:  Mental Status: Opens to voice, tracks, follows simple commands. Mouths words  Cranial Nerves: pupils 1-2mm, R facial palsy with smile  Motor: normal tone, RUE and RLE drift.  Sensation:  intact      I personally reviewed the below data/images/labs:    LABS:                          8.2    11.06 )-----------( 426      ( 23 May 2024 00:15 )             25.7     05-23    135  |  95<L>  |  28<H>  ----------------------------<  203<H>  4.0   |  27  |  3.29<H>    Ca    8.1<L>      23 May 2024 00:15  Phos  1.9     05-23  Mg     2.30     05-23    TPro  x   /  Alb  1.9<L>  /  TBili  x   /  DBili  x   /  AST  x   /  ALT  x   /  AlkPhos  x   05-22    LIVER FUNCTIONS - ( 22 May 2024 05:30 )  Alb: 1.9 g/dL / Pro: x     / ALK PHOS: x     / ALT: x     / AST: x     / GGT: x           PT/INR - ( 23 May 2024 00:15 )   PT: 13.4 sec;   INR: 1.19 ratio         PTT - ( 23 May 2024 00:15 )  PTT:95.7 sec  Urinalysis Basic - ( 23 May 2024 00:15 )    Color: x / Appearance: x / SG: x / pH: x  Gluc: 203 mg/dL / Ketone: x  / Bili: x / Urobili: x   Blood: x / Protein: x / Nitrite: x   Leuk Esterase: x / RBC: x / WBC x   Sq Epi: x / Non Sq Epi: x / Bacteria: x              CT Head No Cont:  (01 May 2024 18:11)  < from: CT Head No Cont (05.01.24 @ 18:11) >    ACC: [id number] EXAM:  CT BRAIN   ORDERED BY: SHELBY MOREL     PROCEDURE DATE:  05/01/2024          INTERPRETATION:  CT OF THE HEAD WITHOUT CONTRAST    CLINICAL INDICATION: Lethargy.    TECHNIQUE: Volumetric CT acquisition was performed through the brain and   reviewed using brain and bone window technique.      COMPARISON: CT head from 1/17/2024    FINDINGS:    The ventricular and sulcal size and configuration is age appropriate.     There is no acute loss of gray-white differentiation. Stable bilateral   inferior frontal encephalomalacia and gliosis likely related to remote   trauma.    There is no evidence of mass effect, midline shift, acute intracranial   hemorrhage, or extra-axial collections.     The calvarium is intact. The paranasalsinuses are clear.The mastoid air   cells are predominantly clear. The orbits are unremarkable.      IMPRESSION:  No acute intracranial hemorrhage or acute territorial infarction. Chronic   findings as above.    --- End of Report ---          GILDARDO KHAN; Resident Radiologist  This document has been electronically signed.  LADARIUS DENNY MD; Attending Radiologist  This document has been electronically signed. May  1 2024  7:54PM    < end of copied text >      EEG Classification / Summary:  Abnormal EEG in the awake, drowsy states.   -Generalized sharp waves with triphasic morphology, initially appearing as frequent GPDs at 1-1.5 Hz, decreasing in prevalence later in recording.  -Variable moderate to mild diffuse slowing.  -No electrographic seizures are captured.     Clinical Impression:  -Generalized sharp waves with triphasic morphology can be seen in toxic-metabolic encephalopathies and indicate some risk of generalized seizures, especially when at higher frequencies than in this study. These decrease in prevalence over the course of recording.  -Variable moderate to mild diffuse cerebral dysfunction is nonspecific in etiology.   -No seizures x2 days of recording.    m< from: MR Head w/wo IV Cont (05.06.24 @ 18:10) >    ACC: [id number] EXAM:  MR BRAIN WAW IC   ORDERED BY: DOLORES QUICK     PROCEDURE DATE:  05/06/2024          INTERPRETATION:  CLINICAL INDICATION: Altered mental status.    TECHNIQUE: Multi-planar, multi-sequence magnetic resonance imaging of the   brain was performed with and without intravenous contrast.    CONTRAST: Post-contrast MR images were obtained following infusion of 5   mL of Gadavist    COMPARISON: No prior studies are available for comparison    FINDINGS:    VENTRICLES AND SULCI:  Normal.  INTRA-AXIAL: Punctate foci of restricted diffusion in the left talya and   left cerebellum with associated T2 prolongation consistent with acute   infarcts. No acute intracranial hemorrhage. Encephalomalacia/gliosis   noted in the inferior frontal lobes. No abnormal enhancement. There are   patchy and confluent foci of hyperintense T2 signal within the   subcortical and periventricular white matter which are nonspecific but   likely related to chronic microvascular ischemic disease.  EXTRA-AXIAL:  No mass or collection.  VISUALIZED SINUSES: Mild polypoid mucosal thickening. Fluid noted in the   nasopharynx.  VISUALIZED MASTOIDS:  Clear.  CALVARIUM:  Normal.  CAROTID FLOW VOIDS: Normal.  MISCELLANEOUS:  None.    IMPRESSION: PUNCTATE FOCI OF RESTRICTED DIFFUSION IN THE LEFT TALYA AND   LEFT CEREBELLUM WITH ASSOCIATED T2 PROLONGATION CONSISTENT WITH ACUTE   INFARCTS. NO ACUTE INTRACRANIAL HEMORRHAGE. NO AREA OF ABNORMAL   ENHANCEMENT.    < end of copied text >    m< from: MR Angio Head No Cont (05.09.24 @ 15:58) >    ACC: [id number] EXAM:  MR ANGIO NECK WAW IC   ORDERED BY: OMAR NESBITT     ACC: [id number] EXAM:  MR ANGIO BRAIN   ORDERED BY: OMAR NESBITT     PROCEDURE DATE:  05/09/2024          INTERPRETATION:  .    CLINICAL INFORMATION: Stroke workup. Talya/cerebellar stroke.    TECHNIQUE: MRA images through the neck and [place] were obtained   using a combination of 2-D and 3-D time-of-flight acquisition. Post   contrast MR angiography of the neck was also performed. The data was then   reformatted intoa volumetric data set and reviewed as rotational MIP   images. 5 cc's of IV Gadavist was administered without immediate   complication. 2.5 cc's was discarded.    COMPARISON: Prior head CT exam dated 5/1/2024.    FINDINGS:    MRA Neck: There is a standard anatomic configuration to the aortic arch.    The origins of the great vessels appear unremarkable. The bilateral   common carotid arteries and carotid bifurcations appear unremarkable.    The bilateral cervical internal carotid arteries are within normal limits.    The origins of the bilateral vertebral arteries are normal. The bilateral   cervical vertebral arteries are normal in course and caliber.    MRA [place]: The bilateral intracranial internal carotid,   anterior, and middle cerebral arteries appear unremarkable.    The anterior and bilateral posterior communicating arteries are notable.    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 Otherwise, the   bilateral intradural vertebral arteries, vertebrobasilar junction,   basilar artery, and basilar tip appear unremarkable as well as the   bilateral posterior cerebral arteries.    IMPRESSION: No large vessel occlusion or stenosis.    < end of copied text >

## 2024-05-23 NOTE — PROGRESS NOTE ADULT - SUBJECTIVE AND OBJECTIVE BOX
TEAM [ C ] Surgery Daily Progress Note  =====================================================    SUBJECTIVE: Patient seen and examined at bedside on AM rounds. Subjective limited as patient is trached and on ventilator       ALLERGIES:  No Known Allergies      --------------------------------------------------------------------------------------    MEDICATIONS:    Neurologic Medications  acetaminophen   Oral Liquid .. 650 milliGRAM(s) Oral every 6 hours PRN Temp greater or equal to 38C (100.4F), Moderate Pain (4 - 6)  melatonin 3 milliGRAM(s) Oral at bedtime PRN Insomnia    Respiratory Medications  albuterol/ipratropium for Nebulization 3 milliLiter(s) Nebulizer every 12 hours  sodium chloride 3%  Inhalation 4 milliLiter(s) Inhalation every 12 hours    Cardiovascular Medications  carvedilol 12.5 milliGRAM(s) Oral every 12 hours  hydrALAZINE 100 milliGRAM(s) Oral three times a day    Gastrointestinal Medications  dextrose 10% Bolus 125 milliLiter(s) IV Bolus once  dextrose 5%. 1000 milliLiter(s) IV Continuous <Continuous>  dextrose 5%. 1000 milliLiter(s) IV Continuous <Continuous>  ferrous    sulfate Liquid 300 milliGRAM(s) Enteral Tube daily  sodium bicarbonate 650 milliGRAM(s) Oral every 8 hours  sodium chloride 0.9% lock flush 10 milliLiter(s) IV Push every 1 hour PRN Pre/post blood products, medications, blood draw, and to maintain line patency    Genitourinary Medications    Hematologic/Oncologic Medications  aspirin  chewable 81 milliGRAM(s) Oral daily  epoetin reilly (EPOGEN) Injectable 16405 Unit(s) IV Push <User Schedule>  heparin  Infusion. 1100 Unit(s)/Hr IV Continuous <Continuous>    Antimicrobial/Immunologic Medications  piperacillin/tazobactam IVPB.. 3.375 Gram(s) IV Intermittent every 12 hours    Endocrine/Metabolic Medications  atorvastatin 20 milliGRAM(s) Oral at bedtime  dextrose 50% Injectable 25 Gram(s) IV Push once  dextrose 50% Injectable 12.5 Gram(s) IV Push once  dextrose Oral Gel 15 Gram(s) Oral once PRN Blood Glucose LESS THAN 70 milliGRAM(s)/deciliter  glucagon  Injectable 1 milliGRAM(s) IntraMuscular once  insulin lispro (ADMELOG) corrective regimen sliding scale   SubCutaneous every 6 hours  insulin NPH human recombinant 4 Unit(s) SubCutaneous every 6 hours    Topical/Other Medications  chlorhexidine 0.12% Liquid 15 milliLiter(s) Oral Mucosa every 12 hours  chlorhexidine 2% Cloths 1 Application(s) Topical <User Schedule>    --------------------------------------------------------------------------------------    VITAL SIGNS:  T(C): 37.2 (05-23-24 @ 04:00), Max: 37.3 (05-22-24 @ 16:18)  HR: 60 (05-23-24 @ 07:38) (57 - 64)  BP: 162/52 (05-23-24 @ 04:00) (109/69 - 162/52)  RR: 19 (05-23-24 @ 04:00) (18 - 20)  SpO2: 98% (05-23-24 @ 07:38) (96% - 100%)  --------------------------------------------------------------------------------------    EXAM    General: NAD, resting in bed comfortably.  Cardiac: regular rate, warm and well perfused  Respiratory: Trached on ventilator  Abdomen: soft, nontender, nondistended.   Extremities: normal strength, FROM, no deformities    --------------------------------------------------------------------------------------    LABS                        8.2    11.06 )-----------( 426      ( 23 May 2024 00:15 )             25.7     --------------------------------------------------------------------------------------    INS AND OUTS:  05-23    135  |  95<L>  |  28<H>  ----------------------------<  203<H>  4.0   |  27  |  3.29<H>    Ca    8.1<L>      23 May 2024 00:15  Phos  1.9     05-23  Mg     2.30     05-23    TPro  x   /  Alb  1.9<L>  /  TBili  x   /  DBili  x   /  AST  x   /  ALT  x   /  AlkPhos  x   05-22 05-22-24 @ 07:01  -  05-23-24 @ 07:00  --------------------------------------------------------  IN: 1397 mL / OUT: 2200 mL / NET: -803 mL      --------------------------------------------------------------------------------------

## 2024-05-23 NOTE — PROGRESS NOTE ADULT - ASSESSMENT
73-year-old male past medical history hypertension, ESRD on dialysis Monday Wednesday Friday, diabetes insulin-dependent presents with hiccups patient endorses persistent hiccups for the last 2 days.   found to have peaked T waves, a new finding. denied dizziness, N/V, denies CP, palps, abd pain  Upon admission seen by CArd, renal and GI  found to have a distended GB prob 2/2 gastroparesis, was started on Reglan  has received 4 doses of baclofen since 4/30 2/2 hiccups, last dose on 5/1 at 5 am  had received Haldol for agitation at 11 pm on 4/30, AMS observed in pm of 5/1  AMS ongoing, RRT x 2 called  now transferred to MICU for encephalopathy requiring  airway protection on 5/2,  intubated for airway protection, was on  propofol and pressors. now off  toxicology consulted upon dx of encephalopathy, not impressed AMS 2/2 Haldol nor baclofen . AMS was 2/2 acute CVA   HD was not done timely until femoral shiley was placed 2/2 clotted RUE AVF. now removed and RIJ shiley placed on 5/3  has been nonverbal since pm 5/1.     MR brain 5/6 c/w L pontine and L cerebellar acute infarcts, no hemorrhage . as per neuro: embolic in nature.   encephalopathy probably 2/2 acute CVA, now follows commands appropriately off sedation.   no SZ focus on EEG,   off CRRT,  HD resumed as per renal.   remains intubated, now trached  off keppra  AC resumed, on Heparin drip for R common iliac DVT  completed 5 days of empiric ZOSYN on 5/7, shortly after became septic again after an extubation trial. bacteremia w B. cereus, on Vanc through 5/20 as per ID    s/p trache placement by pulm,   IR unable to find a window for G-J tube. PEG placed by surgery 5/17, resume feeds when cleared by surgery  HD via R IJ.  Tmax overnight( 5/18)  101, cxr c/w RLL PNA, now on Zosyn, blood Cx s sent. remains on Vanco for B. cereus bacteremia   leukocytosis resolved  EKG changes on 5/3 c/w NSTEMI loaded w DAPT , was  on Heparin drip x 48 hrs. rpt TTE is unchanged  ID, Neuro , renal, cardiology following.  clotted RUE AVF. fistulogram some time this week w Dr. Lockett,  vascular  HD via RIght IJ Shiley.   LP done, no sign of meningitis.   NEUROLOGY     - CTH without acute findings   - LP done, no acute findings  - MR brain w acute infarcts: L talya and L cerebellum, nonhemorrhagic  - no Sz focus on EEG    CARDIOVASCULAR   - ptn never had CP. just peaked T waves. was awaiting ischemic study w nucl stress test  - TTE showing EF 72%, rpt unchanged  - EKG changes on 5/3, loaded w DAPT now on Heparin drip 2/2 DVT    GI  - PEG placed, npo w tube feeds  - Gastroparesis       RENAL   -  HD as per renal  - via R IJ shiley  -  fistula study of RUE  as per Vascular : 5/24  - permacath some time after fistulogram       INFECTIOUS DISEASE     completed a course of Zosyn, then became septic, bacteremic a B. cereus, completed 3 days of Meropenem, completed vanc through 5/21  on Zosyn since 5/18 for RLL PNA, afebrile, completed today    ENDOCRINE   - DM  - cw ISS    DVT  - RLE , on Heparin drip    GOC:  Full code

## 2024-05-23 NOTE — CHART NOTE - NSCHARTNOTEFT_GEN_A_CORE
PRE-INTERVENTIONAL RADIOLOGY PROCEDURE NOTE    Patient Age: 73y  Patient Gender: Male    Procedure: Fistulogram     Diagnosis / Indication: ESRD on HD, failed HD via AVF     Vascular Attending Physician: Bowen Yoo)  Ordering Attending Physician: Dr. Gates    Pertinent medical history:     Pertinent labs:                       8.2    11.06 )-----------( 426      ( 23 May 2024 00:15 )             25.7       05-23    135  |  95<L>  |  28<H>  ----------------------------<  203<H>  4.0   |  27  |  3.29<H>    Ca    8.1<L>      23 May 2024 00:15  Phos  1.9     05-23  Mg     2.30     05-23    TPro  x   /  Alb  1.9<L>  /  TBili  x   /  DBili  x   /  AST  x   /  ALT  x   /  AlkPhos  x   05-22      PT/INR - ( 23 May 2024 00:15 )   PT: 13.4 sec;   INR: 1.19 ratio         PTT - ( 23 May 2024 00:15 )  PTT:95.7 sec    Patient and Family aware? Yes    April Leblanc PA-C   Contact #: pgr [phone] PRE-VASCULAR SURGERY PROCEDURE NOTE    Patient Age: 73y  Patient Gender: Male    Procedure: Fistulogram     Diagnosis / Indication: ESRD on HD, failed HD via AVF     Vascular Attending Physician: Bowen Yoo)  Ordering Attending Physician: Dr. Gates    Pertinent medical history:     Pertinent labs:                       8.2    11.06 )-----------( 426      ( 23 May 2024 00:15 )             25.7       05-23    135  |  95<L>  |  28<H>  ----------------------------<  203<H>  4.0   |  27  |  3.29<H>    Ca    8.1<L>      23 May 2024 00:15  Phos  1.9     05-23  Mg     2.30     05-23    TPro  x   /  Alb  1.9<L>  /  TBili  x   /  DBili  x   /  AST  x   /  ALT  x   /  AlkPhos  x   05-22      PT/INR - ( 23 May 2024 00:15 )   PT: 13.4 sec;   INR: 1.19 ratio         PTT - ( 23 May 2024 00:15 )  PTT:95.7 sec    Patient and Family aware? Yes    April Leblanc PA-C   Contact #: pgr [phone]

## 2024-05-23 NOTE — PROGRESS NOTE ADULT - ASSESSMENT
74 YO M with PMHx of ESRD on HD MWF, HTN, and 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 Baclofen started and course complicated by AMS second to baclofen toxicity requiring intubation and MICU transfer. Course further complicated by LEFT talya and cerebellum stroke, R AVF stenosis, aspiration and B Cereus bacteremia and RLE DVT. s/p trach and transferred to RCU 5/16    NEUROLOGY  # AMS   - MRI HEAD (5/6) with punctate foci in the left talya and left cerebellum with concern for acute infarct.   - MRA HEAD and NECK (5/6) with no large vessel occlusion or stenosis.  - Continue on aspirin and hold DAPT for now pending procedures   - Continue on Lipitor for medical management   - Supportive care     CARDIOVASCULAR  # CP with NSTEMI vs demand ischemia with HTN   - Admitted for CP and HTN with peaked T WAVES and ECG with ST deviation on admission   - Troponins elevated on admission with negative CKMB   - TTE (4/30) with EF 72, normal LVRVSF, and no regional wall motion abnormalities   - Case discussed with cardiology and no acute need for cath. DAPT loaded on 5/4 and plan for medical management with ASA, lipitor and heparin GTT.     # Septic vs vasoplegic shock  # Hx of HTN   - Home BP medications held and pressors weaned off   - Continue on coreg 12.5 and hydralazine 100 TID   - Monitor BP with goal 150/90s    RESPIRATORY  # Respiratory failure   - AMS noted with baclofen toxicity requiring intubation   - Attempted extubation in MICU however course complicated by aspiration and prolonged course and now s/p tracheostomy with IP on 5/14  - Continue on vent 12/500/5/30 and able to tolerate SBT 10/5 x several hours but limited by weakness   - Continue on nebs and HTS Q12H for now     GI  # Dysphagia   - s/p surgical PEG 5/17   - Continue on tube feeds via PEG     RENAL  # ESRD on HD MWF with R BCF  # Fistula stenosis ?  - VA AVF (5/2) with Right radiocephalic arteriovenous fistula has no hemodynamically significant stenosis present at the anastomotic site ( cm/sec, EDV 49 cm/sec). The usable segment is partially thrombosed with minimal patency.  - Required R FEMORAL line on medicine, then removed on 5/2, and replaced with R IJ SHILEY on 5/3 for CRRT then converted back to iHD MWF   - R SHILEY removed on 5/10 second to bacteremia and replaced on 5/13   - Continue on HD MWF per HD   - Continue on Renvela 1600   - Continue on HCO3 650 tabs   - Plan for Fistulogram tomorrow with Vascular - Will make NPO p MN    INFECTIOUS DISEASE  # Aspiration   - Recurrent fever spike 5/17 overnight and CXR with RLL opacity   - BCx and SCx negative, however fever spikes improved on ABX   - Zosyn continued empirically (5/18 - until 5/23)   - Blood cx 5/18 NGTD    # B Cereus Bacteremia   - Course complicated by fevers and aspiration event   - MRSA (5/1) negative   - BCx (5/2) negative   - SCx (5/4) and BAL (5/12) negative   - BCx (5/10) with bacillus cereus and cleared on (5/12).   - Case discussed with ID and s/p Vancomycin through 5/21 x 10 day course.     HEME  # AOCD with ESRD   - Anemia panel with AOCD and low # sat   - Transfused on 5/16   - EPO 10K continued on TIW   - Ferrous supplement added   - Monitor HH     VASCULAR   # RLE DVT   - CT AP (5/12) with nonocclusive RIGHT common iliac vein deep venous thrombosis  - Continue on heparin GTT   - RLE precautions     ENDOCRINE  # IDDM2 A1C 6.9  - Continue on NPH 4U and ISS   - Monitor and adjust insulin based on FS     SKIN  - R FEMORAL (5/1-5/2)   - R IJ SHILEY (5/3-5/10)   - R IJ SHILEY (5/13 - )     ETHICS/ GOC    - FULL CODE     DISPO - Vent facility    74 YO M with PMHx of ESRD on HD MWF, HTN, and 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 Baclofen started and course complicated by AMS second to baclofen toxicity requiring intubation and MICU transfer. Course further complicated by LEFT talya and cerebellum stroke, R AVF stenosis, aspiration and B Cereus bacteremia and RLE DVT. s/p trach and transferred to RCU 5/16    NEUROLOGY  # AMS   - MRI HEAD (5/6) with punctate foci in the left talya and left cerebellum with concern for acute infarct.   - MRA HEAD and NECK (5/6) with no large vessel occlusion or stenosis.  - Continue on aspirin and hold DAPT for now pending procedures   - Continue on Lipitor for medical management   - Supportive care     CARDIOVASCULAR  # CP with NSTEMI vs demand ischemia with HTN   - Admitted for CP and HTN with peaked T WAVES and ECG with ST deviation on admission   - Troponins elevated on admission with negative CKMB   - TTE (4/30) with EF 72, normal LVRVSF, and no regional wall motion abnormalities   - Case discussed with cardiology and no acute need for cath. DAPT loaded on 5/4 and plan for medical management with ASA, lipitor and heparin GTT.     # Septic vs vasoplegic shock  # Hx of HTN   - Home BP medications held and pressors weaned off   - Continue on coreg 12.5 and hydralazine 100 TID   - Monitor BP with goal 150/90s    RESPIRATORY  # Respiratory failure   - AMS noted with baclofen toxicity requiring intubation   - Attempted extubation in MICU however course complicated by aspiration and prolonged course and now s/p tracheostomy with IP on 5/14  - Continue on vent 12/500/5/30 and able to tolerate SBT 10/5 x several hours but limited by weakness   - Continue on nebs and HTS Q12H for now     GI  # Dysphagia   - s/p surgical PEG 5/17   - Continue on tube feeds via PEG     RENAL  # ESRD on HD MWF with R BCF  # Fistula stenosis ?  - VA AVF (5/2) with Right radiocephalic arteriovenous fistula has no hemodynamically significant stenosis present at the anastomotic site ( cm/sec, EDV 49 cm/sec). The usable segment is partially thrombosed with minimal patency.  - Required R FEMORAL line on medicine, then removed on 5/2, and replaced with R IJ SHILEY on 5/3 for CRRT then converted back to iHD MWF   - R SHILEY removed on 5/10 second to bacteremia and replaced on 5/13   - Continue on HD MWF per HD   - Continue on Renvela 1600   - Continue on HCO3 650 tabs   - Initially the plan for Fistulogram was for 5/23, however cancelled by Vascular given emergent procedure.  - Will plan for tomorrow 5/24 - Will make NPO p MN, and pre op AM labs ordered     INFECTIOUS DISEASE  # Aspiration   - Recurrent fever spike 5/17 overnight and CXR with RLL opacity   - BCx and SCx negative, however fever spikes improved on ABX   - s/p treated with Zosyn empirically (5/18 - 5/23)   - Blood cx 5/18 NGTD    # B Cereus Bacteremia   - Course complicated by fevers and aspiration event   - MRSA (5/1) negative   - BCx (5/2) negative   - SCx (5/4) and BAL (5/12) negative   - BCx (5/10) with bacillus cereus and cleared on (5/12).   - Case discussed with ID and s/p Vancomycin through 5/21 x 10 day course.     HEME  # AOCD with ESRD   - Anemia panel with AOCD and low # sat   - Transfused on 5/16   - EPO 10K continued on TIW   - Ferrous supplement added   - Monitor HH     VASCULAR   # RLE DVT   - CT AP (5/12) with nonocclusive RIGHT common iliac vein deep venous thrombosis  - Continue on heparin GTT - will hold on call to OR tomorrow  - RLE precautions     ENDOCRINE  # IDDM2 A1C 6.9  - Continue on NPH 4U q 6 and ISS   - Monitor and adjust insulin based on FS     SKIN  - R FEMORAL (5/1-5/2)   - R IJ SHILEY (5/3-5/10)   - R IJ SHILEY (5/13 - )     ETHICS/ GOC    - FULL CODE     DISPO - Vent facility

## 2024-05-23 NOTE — CHART NOTE - NSCHARTNOTEFT_GEN_A_CORE
73M w/ PMHx hypertension, ESRD on HD via R radiocephalic fistula (MWF), DM, HTN, p/w hiccups and peaked T waves, admitted to MICU for c/f baclofen toxicity. Patient received 1x HD on admission. R femoral shiley removed 5/2, had 2 RRT for AMS and failed HD via AVF 5/3. S/p emergent R IJ shiley placement 5/3, started CRRT which is now transitioned back to iHD. Vascular planning RUE fistulogram when medically optimized. Extubated to HFNC then reintubated 5/12 for c/f aspiration w/ hypoxic resp failure. S/p trach 5/14. Downgraded from MICU to RCU 5/16.      RUE fistulogram postponed to tomorrow. Please preop patient.  - NPO at MN  - 4am preop labs - CBC, BMP, coags  - Please plan for HD tonight vs after case tomorrow depending on nephro plans  - Consent signed, in chart      Vascular Surgery  [id number] 73M w/ PMHx hypertension, ESRD on HD via R radiocephalic fistula (MWF), DM, HTN, p/w hiccups and peaked T waves, admitted to MICU for c/f baclofen toxicity. Patient received 1x HD on admission. R femoral shiley removed 5/2, had 2 RRT for AMS and failed HD via AVF 5/3. S/p emergent R IJ shiley placement 5/3, started CRRT which is now transitioned back to iHD. Vascular planning RUE fistulogram when medically optimized. Extubated to HFNC then reintubated 5/12 for c/f aspiration w/ hypoxic resp failure. S/p trach 5/14. Downgraded from MICU to RCU 5/16.      RUE fistulogram postponed to tomorrow. Please preop patient.  - NPO at MN  - 4am preop labs - CBC, BMP, coags  - Please plan for HD tonight vs after case tomorrow depending on nephro plans  - Consent signed, in chart  - Hold heparin on call to OR      Vascular Surgery  [id number]

## 2024-05-23 NOTE — PROGRESS NOTE ADULT - ASSESSMENT
73-year-old male past medical history hypertension, ESRD on dialysis Monday Wednesday Friday, diabetes insulin-dependent presents with hiccups patient endorses persistent hiccups for the last 2 days. Nephrology consulted for HD needs.    A/P  ESRD:  Center: [place]  Nephrologist: Dr. Hardwick  Access: R AVF  MWF schedule  Vascular for fistulogram   s/p femoral shiley on 5/1, now removed  s/p placement of IJ shiley 5/3  Stopped CRRT   Restarted IHD  s/p HD 5/7 UF 1778; treatment terminated early due to hypotension   S/P MRA head/neck w/ gadolinium --> Received HD on 5/9 and 5/10.  5/10 Will need to dialyze 3 days consecutively--> plan for HD on 5/11 5/11 shiley removed due to bacteremia; did not receive HD.  Line holiday  5/12 - BUN worsened; K up trending.    5/13 Shiley placed.  5/17: s/p PEG placement.   5/21 IR consulted for shiley exchange; IR recommended to keep current shiley in place, with pending fistulogram   Planned for fistulogram today.  Plan for HD tomorrow --> increase UF goal in view of b/l UE edema.  continue HD MWF   Consent obtained and placed in ED chart  Renal diet  Monitor BMP  consider [facility] for rehab where his outpatient Nephro-Dr. Hardwick can follow him    Hyperkalemia/Hypokalemia  Improved w/ HD.  C/W HD schedule as above.  Lokelma 10gm PRN for K >5.3 on non-HD days  PhosNaK given 5/21 and again today.  K better  Low K diet.  Monitor closely     HTN:  BP fluctuating; suboptimal.  On carvedilol 12.5mg BID, hydralazine 100mg TID.  Currently of nifedipine --> consider restarting nifedipine @ 30mg if BP remains suboptimal.  UF w/ HD as BP permits.  Monitor BP.    Anemia:  + iron deficiency.  Tsat 13% - on ferrous sulfate 300mg qd via PEG.  Will hold venofer for now in view of up trending leukocytosis.  SILVA w/ HD.  Hgb improving.  Tranfuse for Hgb <7.  Monitor Hb.    CKD-MBD  Check PTH  PO4 remains low.  Sevelamer 1600mg TID d/c'ed 5/21.  Repleted w/ PhosNaK x2 doses today.  Monitor Ca, PO4 daily    Hypocalcemia:  In setting of CKD vs. hypoalbuminemia.  Optimize albumin.  Corrected Ca WNL.  Repeat Ca better today.  Monitor Ca.

## 2024-05-23 NOTE — PROGRESS NOTE ADULT - SUBJECTIVE AND OBJECTIVE BOX
Patient is a 73y Male     Patient is a 73y old  Male who presents with a chief complaint of Unstable angina, abn EKG (22 May 2024 22:18)      HPI:  73-year-old male past medical history hypertension, ESRD on dialysis Monday Wednesday Friday, diabetes insulin-dependent presents with hiccups patient endorses persistent hiccups for the last 2 days. found to have peaked T waves, a new finding. denies dizziness, N/V, denies CP, palps, abd pain (29 Apr 2024 21:15)      PAST MEDICAL & SURGICAL HISTORY:  Diabetes      Benign essential HTN      HLD (hyperlipidemia)      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      ESRD on hemodialysis      Arteriovenous fistula          MEDICATIONS  (STANDING):  albuterol/ipratropium for Nebulization 3 milliLiter(s) Nebulizer every 12 hours  aspirin  chewable 81 milliGRAM(s) Oral daily  atorvastatin 20 milliGRAM(s) Oral at bedtime  carvedilol 12.5 milliGRAM(s) Oral every 12 hours  chlorhexidine 0.12% Liquid 15 milliLiter(s) Oral Mucosa every 12 hours  chlorhexidine 2% Cloths 1 Application(s) Topical <User Schedule>  dextrose 10% Bolus 125 milliLiter(s) IV Bolus once  dextrose 5%. 1000 milliLiter(s) (50 mL/Hr) IV Continuous <Continuous>  dextrose 5%. 1000 milliLiter(s) (100 mL/Hr) IV Continuous <Continuous>  dextrose 50% Injectable 12.5 Gram(s) IV Push once  dextrose 50% Injectable 25 Gram(s) IV Push once  epoetin reilly (EPOGEN) Injectable 92849 Unit(s) IV Push <User Schedule>  ferrous    sulfate Liquid 300 milliGRAM(s) Enteral Tube daily  glucagon  Injectable 1 milliGRAM(s) IntraMuscular once  heparin  Infusion. 1100 Unit(s)/Hr (11 mL/Hr) IV Continuous <Continuous>  hydrALAZINE 100 milliGRAM(s) Oral three times a day  insulin lispro (ADMELOG) corrective regimen sliding scale   SubCutaneous every 6 hours  insulin NPH human recombinant 4 Unit(s) SubCutaneous every 6 hours  piperacillin/tazobactam IVPB.. 3.375 Gram(s) IV Intermittent every 12 hours  sodium bicarbonate 650 milliGRAM(s) Oral every 8 hours  sodium chloride 3%  Inhalation 4 milliLiter(s) Inhalation every 12 hours      Allergies    No Known Allergies    Intolerances        SOCIAL HISTORY:  Denies ETOh,Smoking,     FAMILY HISTORY:  FHx: diabetes mellitus (Father, Aunt)        REVIEW OF SYSTEMS:    CONSTITUTIONAL: No weakness, fevers or chills  EYES/ENT: No visual changes;  No vertigo or throat pain   NECK: No pain or stiffness  RESPIRATORY: No cough, wheezing, hemoptysis; No shortness of breath  CARDIOVASCULAR: No chest pain or palpitations  GASTROINTESTINAL: No abdominal or epigastric pain. No nausea, vomiting, or hematemesis; No diarrhea or constipation. No melena or hematochezia.  GENITOURINARY: No dysuria, frequency or hematuria  NEUROLOGICAL: No numbness or weakness  SKIN: No itching, burning, rashes, or lesions   All other review of systems is negative unless indicated above.    VITAL:  T(C): , Max: 37.3 (05-22-24 @ 16:18)  T(F): , Max: 99.2 (05-22-24 @ 16:18)  HR: 61 (05-23-24 @ 04:00)  BP: 162/52 (05-23-24 @ 04:00)  BP(mean): --  RR: 19 (05-23-24 @ 04:00)  SpO2: 100% (05-23-24 @ 04:00)  Wt(kg): --    I and O's:    05-20 @ 07:01  -  05-21 @ 07:00  --------------------------------------------------------  IN: 1072 mL / OUT: 2150 mL / NET: -1078 mL    05-21 @ 07:01  -  05-22 @ 07:00  --------------------------------------------------------  IN: 672 mL / OUT: 0 mL / NET: 672 mL    05-22 @ 07:01  -  05-23 @ 06:53  --------------------------------------------------------  IN: 1397 mL / OUT: 2200 mL / NET: -803 mL          PHYSICAL EXAM:    Constitutional: NAD  HEENT: PERRLA,   Neck: No JVD  Respiratory: CTA B/L  Cardiovascular: S1 and S2  Gastrointestinal: BS+, soft, NT/ND  Extremities: No peripheral edema  Neurological: A/O x 3, no focal deficits  Psychiatric: Normal mood, normal affect  : No Abbasi  Skin: No rashes  Access: Not applicable  Back: No CVA tenderness    LABS:                        8.2    11.06 )-----------( 426      ( 23 May 2024 00:15 )             25.7     05-23    135  |  95<L>  |  28<H>  ----------------------------<  203<H>  4.0   |  27  |  3.29<H>    Ca    8.1<L>      23 May 2024 00:15  Phos  1.9     05-23  Mg     2.30     05-23    TPro  x   /  Alb  1.9<L>  /  TBili  x   /  DBili  x   /  AST  x   /  ALT  x   /  AlkPhos  x   05-22          RADIOLOGY & ADDITIONAL STUDIES:

## 2024-05-23 NOTE — PROGRESS NOTE ADULT - SUBJECTIVE AND OBJECTIVE BOX
[facility]   MD ELIANE BHAGAT MD ANGELA WONG, PA QIAN CHEN, [place]    TEL:  OFFICE: [phone]  From 5pm-7am Answering Service [phone]    -- RENAL FOLLOW UP NOTE ---Date of Service 05-23-24 @ 13:16    Patient is a 73y old  Male who presents with a chief complaint of Unstable angina, abn EKG.      Patient seen and examined at bedside. Trached to vent; no respiratory distress.    VITALS:  T(F): 98.7 (05-23-24 @ 08:00), Max: 99.2 (05-22-24 @ 16:18)  HR: 59 (05-23-24 @ 10:42)  BP: 151/86 (05-23-24 @ 09:41)  RR: 12 (05-23-24 @ 08:00)  SpO2: 99% (05-23-24 @ 10:42)  Wt(kg): --    05-22 @ 07:01  -  05-23 @ 07:00  --------------------------------------------------------  IN: 1397 mL / OUT: 2200 mL / NET: -803 mL      PHYSICAL EXAM:  General: NAD  Neck: No JVD  Respiratory: + b/l rhonchi  Cardiovascular: S1, S2, RRR  Gastrointestinal: BS+, soft, NT/ND  Extremities: + swelling of b/l arms.    Hospital Medications:   MEDICATIONS  (STANDING):  albuterol/ipratropium for Nebulization 3 milliLiter(s) Nebulizer every 12 hours  aspirin  chewable 81 milliGRAM(s) Oral daily  atorvastatin 20 milliGRAM(s) Oral at bedtime  carvedilol 12.5 milliGRAM(s) Oral every 12 hours  chlorhexidine 0.12% Liquid 15 milliLiter(s) Oral Mucosa every 12 hours  chlorhexidine 2% Cloths 1 Application(s) Topical <User Schedule>  dextrose 10% Bolus 125 milliLiter(s) IV Bolus once  dextrose 5%. 1000 milliLiter(s) (50 mL/Hr) IV Continuous <Continuous>  dextrose 5%. 1000 milliLiter(s) (100 mL/Hr) IV Continuous <Continuous>  dextrose 50% Injectable 25 Gram(s) IV Push once  dextrose 50% Injectable 12.5 Gram(s) IV Push once  epoetin reilly (EPOGEN) Injectable 24060 Unit(s) IV Push <User Schedule>  ferrous    sulfate Liquid 300 milliGRAM(s) Enteral Tube daily  glucagon  Injectable 1 milliGRAM(s) IntraMuscular once  heparin  Infusion. 1100 Unit(s)/Hr (11 mL/Hr) IV Continuous <Continuous>  hydrALAZINE 100 milliGRAM(s) Oral three times a day  insulin lispro (ADMELOG) corrective regimen sliding scale   SubCutaneous every 6 hours  insulin NPH human recombinant 4 Unit(s) SubCutaneous every 6 hours  piperacillin/tazobactam IVPB.. 3.375 Gram(s) IV Intermittent every 12 hours  potassium phosphate / sodium phosphate Powder (PHOS-NaK) 1 Packet(s) Oral two times a day  sodium bicarbonate 650 milliGRAM(s) Oral every 8 hours  sodium chloride 3%  Inhalation 4 milliLiter(s) Inhalation every 12 hours      LABS:  05-23    135  |  95<L>  |  28<H>  ----------------------------<  203<H>  4.0   |  27  |  3.29<H>    Ca    8.1<L>      23 May 2024 00:15  Phos  1.9     05-23  Mg     2.30     05-23    TPro      /  Alb  1.9<L>  /  TBili      /  DBili      /  AST      /  ALT      /  AlkPhos      05-22    Creatinine Trend: 3.29 <--, 4.22 <--, 3.58 <--, 6.25 <--, 5.08 <--, 3.64 <--, 6.17 <--    Phosphorus: 1.9 mg/dL (05-23 @ 00:15)                 8.2    11.06 )-----------( 426      ( 23 May 2024 00:15 )             25.7     Urine Studies:  Urinalysis - [05-23-24 @ 00:15]      Color  / Appearance  / SG  / pH       Gluc 203 / Ketone   / Bili  / Urobili        Blood  / Protein  / Leuk Est  / Nitrite       RBC  / WBC  / Hyaline  / Gran  / Sq Epi  / Non Sq Epi  / Bacteria       Iron 16, TIBC 121, %sat 13      [05-17-24 @ 06:00]  Ferritin 720      [05-17-24 @ 06:00]  TSH 2.60      [05-17-24 @ 06:00]  Lipid: chol 86, , HDL 27, LDL --      [05-17-24 @ 06:00]    HBsAb <3.0      [05-01-24 @ 14:42]  HBcAb Nonreact      [05-01-24 @ 14:42]

## 2024-05-23 NOTE — PROGRESS NOTE ADULT - SUBJECTIVE AND OBJECTIVE BOX
Patient is a 73y old  Male who presents with a chief complaint of Unstable angina, abn EKG (23 May 2024 13:43)      SUBJECTIVE / OVERNIGHT EVENTS: fistulogram postponed to 5/24, completed ABx    MEDICATIONS  (STANDING):  albuterol/ipratropium for Nebulization 3 milliLiter(s) Nebulizer every 12 hours  aspirin  chewable 81 milliGRAM(s) Oral daily  atorvastatin 20 milliGRAM(s) Oral at bedtime  carvedilol 12.5 milliGRAM(s) Oral every 12 hours  chlorhexidine 0.12% Liquid 15 milliLiter(s) Oral Mucosa every 12 hours  chlorhexidine 2% Cloths 1 Application(s) Topical <User Schedule>  dextrose 10% Bolus 125 milliLiter(s) IV Bolus once  dextrose 5%. 1000 milliLiter(s) (50 mL/Hr) IV Continuous <Continuous>  dextrose 5%. 1000 milliLiter(s) (100 mL/Hr) IV Continuous <Continuous>  dextrose 50% Injectable 12.5 Gram(s) IV Push once  dextrose 50% Injectable 25 Gram(s) IV Push once  epoetin reilly (EPOGEN) Injectable 39667 Unit(s) IV Push <User Schedule>  ferrous    sulfate Liquid 300 milliGRAM(s) Enteral Tube daily  glucagon  Injectable 1 milliGRAM(s) IntraMuscular once  heparin  Infusion. 1100 Unit(s)/Hr (11 mL/Hr) IV Continuous <Continuous>  hydrALAZINE 100 milliGRAM(s) Oral three times a day  insulin lispro (ADMELOG) corrective regimen sliding scale   SubCutaneous every 6 hours  insulin NPH human recombinant 4 Unit(s) SubCutaneous every 6 hours  piperacillin/tazobactam IVPB.. 3.375 Gram(s) IV Intermittent every 12 hours  sodium bicarbonate 650 milliGRAM(s) Oral every 8 hours  sodium chloride 3%  Inhalation 4 milliLiter(s) Inhalation every 12 hours    MEDICATIONS  (PRN):  acetaminophen   Oral Liquid .. 650 milliGRAM(s) Oral every 6 hours PRN Temp greater or equal to 38C (100.4F), Moderate Pain (4 - 6)  dextrose Oral Gel 15 Gram(s) Oral once PRN Blood Glucose LESS THAN 70 milliGRAM(s)/deciliter  melatonin 3 milliGRAM(s) Oral at bedtime PRN Insomnia  sodium chloride 0.9% lock flush 10 milliLiter(s) IV Push every 1 hour PRN Pre/post blood products, medications, blood draw, and to maintain line patency      Vital Signs Last 24 Hrs  T(F): 99 (05-23-24 @ 16:12), Max: 99 (05-23-24 @ 16:12)  HR: 64 (05-23-24 @ 19:12) (59 - 90)  BP: 165/54 (05-23-24 @ 16:55) (151/86 - 185/58)  RR: 19 (05-23-24 @ 16:55) (12 - 22)  SpO2: 100% (05-23-24 @ 19:12) (98% - 100%)  Telemetry:   CAPILLARY BLOOD GLUCOSE      POCT Blood Glucose.: 124 mg/dL (23 May 2024 17:14)  POCT Blood Glucose.: 156 mg/dL (23 May 2024 11:06)  POCT Blood Glucose.: 112 mg/dL (23 May 2024 05:00)  POCT Blood Glucose.: 215 mg/dL (22 May 2024 23:17)  POCT Blood Glucose.: 237 mg/dL (22 May 2024 21:51)    I&O's Summary    22 May 2024 07:01  -  23 May 2024 07:00  --------------------------------------------------------  IN: 1397 mL / OUT: 2200 mL / NET: -803 mL    23 May 2024 07:01  -  23 May 2024 21:45  --------------------------------------------------------  IN: 672 mL / OUT: 0 mL / NET: 672 mL        PHYSICAL EXAM:  GENERAL: NAD, well-developed  HEAD:  Atraumatic, Normocephalic  EYES: EOMI, PERRLA, conjunctiva and sclera clear  NECK: Supple, No JVD  CHEST/LUNG: Clear to auscultation bilaterally; No wheeze  HEART: Regular rate and rhythm; No murmurs, rubs, or gallops  ABDOMEN: Soft, Nontender, Nondistended; Bowel sounds present  EXTREMITIES:  2+ Peripheral Pulses, No clubbing, cyanosis, or edema  PSYCH: AAOx3  NEUROLOGY: non-focal  SKIN: No rashes or lesions    LABS:                        8.2    11.06 )-----------( 426      ( 23 May 2024 00:15 )             25.7     05-23    135  |  95<L>  |  28<H>  ----------------------------<  203<H>  4.0   |  27  |  3.29<H>    Ca    8.1<L>      23 May 2024 00:15  Phos  1.9     05-23  Mg     2.30     05-23    TPro  x   /  Alb  1.9<L>  /  TBili  x   /  DBili  x   /  AST  x   /  ALT  x   /  AlkPhos  x   05-22    PT/INR - ( 23 May 2024 00:15 )   PT: 13.4 sec;   INR: 1.19 ratio         PTT - ( 23 May 2024 00:15 )  PTT:95.7 sec      Urinalysis Basic - ( 23 May 2024 00:15 )    Color: x / Appearance: x / SG: x / pH: x  Gluc: 203 mg/dL / Ketone: x  / Bili: x / Urobili: x   Blood: x / Protein: x / Nitrite: x   Leuk Esterase: x / RBC: x / WBC x   Sq Epi: x / Non Sq Epi: x / Bacteria: x        RADIOLOGY & ADDITIONAL TESTS:    Imaging Personally Reviewed:    Consultant(s) Notes Reviewed:      Care Discussed with Consultants/Other Providers:

## 2024-05-23 NOTE — PROGRESS NOTE ADULT - ASSESSMENT
72 y/o M PMhx HTN, ESRD (on HD M/W/F), DM who presented with hiccups x 2 days and chest pain found to have peaked T waves. Hospital course c/b AMS s/p RRT on 5/1 and 5/2. Vascular consulted 2/2 RUE AVF access unable to be used s/p R femoral shiley placed for emergent HD. Transferred to MICU and intubated 5/2 for airway protection.   received empiric course of zosyn x 5 days, completed 5/7 and meropenem x 5 days, completed 5/14    PNA, fever  febrile 5/18  CXR- Hazy opacity in the right lower lung field    B. cereus bacteremia  blood cx 5/10 w/ Bacillus cereus in 1/4 bottles  repeat blood cultures 5/11- NGTD   s/p vanc x 10 days, completed 5/20    AMS, CVA  TTE- no evidence of valvular disease  s/p LP 5/2, viral encephalitis/ meningitis ruled out  MRI- Punctate foci of restricted diffusion in the left talya and left cerebellum with associated T2 prolongation consistent with acute infarcts  s/p trach 5/14, s/p PEG 5/17    DVT  CT- Nonocclusive R common iliac vein deep venous thrombosis.    Recommendations  c/w zosyn w/ last day today 5/23    Steven Torres M.D.  [facility], Division of Infectious Diseases  [phone]  After 5pm on weekdays and all day on weekends - please call [phone]

## 2024-05-23 NOTE — PROGRESS NOTE ADULT - SUBJECTIVE AND OBJECTIVE BOX
Cardiovascular Disease Progress Note  DATE OF SERVICE: 05-23-24 @ 10:08    Overnight events: No acute events overnight.    The patient denies chest pain or SOB.   He is in no distress on mechanical ventilation via trach.   Otherwise review of systems negative    Objective Findings:  T(C): 37.1 (05-23-24 @ 08:00), Max: 37.3 (05-22-24 @ 16:18)  HR: 60 (05-23-24 @ 09:41) (57 - 64)  BP: 151/86 (05-23-24 @ 09:41) (109/69 - 183/53)  RR: 12 (05-23-24 @ 08:00) (12 - 19)  SpO2: 100% (05-23-24 @ 09:17) (96% - 100%)  Wt(kg): --  Daily     Daily       Physical Exam:  Gen: NAD; Patient resting comfortably  HEENT: EOMI, Normocephalic/ atraumatic  CV: RRR, normal S1 + S2, no m/r/g  Lungs:  Normal respiratory effort; fair air entry to auscultation bilaterally  Abd: soft, non-tender; bowel sounds present  Ext: No edema; warm and well perfused    Telemetry: n/a    Laboratory Data:                        8.2    11.06 )-----------( [id number]      ( 23 May 2024 00:15 )             25.7     05-23    135  |  95<L>  |  28<H>  ----------------------------<  203<H>  4.0   |  27  |  3.29<H>    Ca    8.1<L>      23 May 2024 00:15  Phos  1.9     05-23  Mg     2.30     05-23    TPro  x   /  Alb  1.9<L>  /  TBili  x   /  DBili  x   /  AST  x   /  ALT  x   /  AlkPhos  x   05-22    PT/INR - ( 23 May 2024 00:15 )   PT: 13.4 sec;   INR: 1.19 ratio         PTT - ( 23 May 2024 00:15 )  PTT:95.7 sec          Inpatient Medications:  MEDICATIONS  (STANDING):  albuterol/ipratropium for Nebulization 3 milliLiter(s) Nebulizer every 12 hours  aspirin  chewable 81 milliGRAM(s) Oral daily  atorvastatin 20 milliGRAM(s) Oral at bedtime  carvedilol 12.5 milliGRAM(s) Oral every 12 hours  chlorhexidine 0.12% Liquid 15 milliLiter(s) Oral Mucosa every 12 hours  chlorhexidine 2% Cloths 1 Application(s) Topical <User Schedule>  dextrose 10% Bolus 125 milliLiter(s) IV Bolus once  dextrose 5%. 1000 milliLiter(s) (50 mL/Hr) IV Continuous <Continuous>  dextrose 5%. 1000 milliLiter(s) (100 mL/Hr) IV Continuous <Continuous>  dextrose 50% Injectable 12.5 Gram(s) IV Push once  dextrose 50% Injectable 25 Gram(s) IV Push once  epoetin reilly (EPOGEN) Injectable 34668 Unit(s) IV Push <User Schedule>  ferrous    sulfate Liquid 300 milliGRAM(s) Enteral Tube daily  glucagon  Injectable 1 milliGRAM(s) IntraMuscular once  heparin  Infusion. 1100 Unit(s)/Hr (11 mL/Hr) IV Continuous <Continuous>  hydrALAZINE 100 milliGRAM(s) Oral three times a day  insulin lispro (ADMELOG) corrective regimen sliding scale   SubCutaneous every 6 hours  insulin NPH human recombinant 4 Unit(s) SubCutaneous every 6 hours  piperacillin/tazobactam IVPB.. 3.375 Gram(s) IV Intermittent every 12 hours  sodium bicarbonate 650 milliGRAM(s) Oral every 8 hours  sodium chloride 3%  Inhalation 4 milliLiter(s) Inhalation every 12 hours       Assessment: 73 year old man with HTN, HLD, T2DM on insulin, and ESRD on HD presents with supply demand ischemia and angina.    Plan of Care:    #Type II myocardial infarction-  Secondary to distributive shock earlier on admission.   The repeat echo shows no new wall motion abnormality.   I would not pursue cath at this time given recurrent aspiration and chronic respiratory failure s/p trach 5/14.   The patient is optimized from a cardiac standpoint to proceed with fistulogram.        #Sinus bradycardia-  No evidence of AV block on admission EKG or telemetry.  No indication for pacing at this time.   - Continue Coreg with holding parameters.     #HTN-  BP labile.  Continue current regimen  Monitor trends.     #ESRD-  HD as per renal     #DVT   - R common Iliac DVT  - Hep GTT  - AC management as per vascular team.           Over 55 minutes spent on total encounter; more than 50% of the visit was spent counseling and/or coordinating care by the attending physician.      Geovani Bravo MD [facility]  Cardiovascular Disease  [phone]

## 2024-05-23 NOTE — PROGRESS NOTE ADULT - SUBJECTIVE AND OBJECTIVE BOX
CHIEF COMPLAINT: Patient is a 73y old  Male who presents with a chief complaint of Unstable angina, abn EKG (23 May 2024 07:56)    Interval Events:    REVIEW OF SYSTEMS:  [ ] All other systems negative  [ ] Unable to assess ROS because ________    Mode: AC/ CMV (Assist Control/ Continuous Mandatory Ventilation), RR (machine): 12, TV (machine): 500, FiO2: 30, PEEP: 5, ITime: 0.67, MAP: 9, PIP: 30    OBJECTIVE:  ICU Vital Signs Last 24 Hrs  T(C): 37.2 (23 May 2024 04:00), Max: 37.3 (22 May 2024 16:18)  T(F): 98.9 (23 May 2024 04:00), Max: 99.2 (22 May 2024 16:18)  HR: 60 (23 May 2024 09:17) (57 - 64)  BP: 162/52 (23 May 2024 04:00) (109/69 - 162/52)  BP(mean): --  ABP: --  ABP(mean): --  RR: 19 (23 May 2024 04:00) (18 - 20)  SpO2: 100% (23 May 2024 09:17) (96% - 100%)    O2 Parameters below as of 23 May 2024 09:17  Patient On (Oxygen Delivery Method): ventilator    Mode: AC/ CMV (Assist Control/ Continuous Mandatory Ventilation), RR (machine): 12, TV (machine): 500, FiO2: 30, PEEP: 5, ITime: 0.67, MAP: 9, PIP: 30    05-22 @ 07:01  -  05-23 @ 07:00  --------------------------------------------------------  IN: 1397 mL / OUT: 2200 mL / NET: -803 mL      CAPILLARY BLOOD GLUCOSE    POCT Blood Glucose.: 112 mg/dL (23 May 2024 05:00)    HOSPITAL MEDICATIONS:  MEDICATIONS  (STANDING):  albuterol/ipratropium for Nebulization 3 milliLiter(s) Nebulizer every 12 hours  aspirin  chewable 81 milliGRAM(s) Oral daily  atorvastatin 20 milliGRAM(s) Oral at bedtime  carvedilol 12.5 milliGRAM(s) Oral every 12 hours  chlorhexidine 0.12% Liquid 15 milliLiter(s) Oral Mucosa every 12 hours  chlorhexidine 2% Cloths 1 Application(s) Topical <User Schedule>  dextrose 10% Bolus 125 milliLiter(s) IV Bolus once  dextrose 5%. 1000 milliLiter(s) (50 mL/Hr) IV Continuous <Continuous>  dextrose 5%. 1000 milliLiter(s) (100 mL/Hr) IV Continuous <Continuous>  dextrose 50% Injectable 25 Gram(s) IV Push once  dextrose 50% Injectable 12.5 Gram(s) IV Push once  epoetin reilly (EPOGEN) Injectable 21182 Unit(s) IV Push <User Schedule>  ferrous    sulfate Liquid 300 milliGRAM(s) Enteral Tube daily  glucagon  Injectable 1 milliGRAM(s) IntraMuscular once  heparin  Infusion. 1100 Unit(s)/Hr (11 mL/Hr) IV Continuous <Continuous>  hydrALAZINE 100 milliGRAM(s) Oral three times a day  insulin lispro (ADMELOG) corrective regimen sliding scale   SubCutaneous every 6 hours  insulin NPH human recombinant 4 Unit(s) SubCutaneous every 6 hours  piperacillin/tazobactam IVPB.. 3.375 Gram(s) IV Intermittent every 12 hours  sodium bicarbonate 650 milliGRAM(s) Oral every 8 hours  sodium chloride 3%  Inhalation 4 milliLiter(s) Inhalation every 12 hours    MEDICATIONS  (PRN):  acetaminophen   Oral Liquid .. 650 milliGRAM(s) Oral every 6 hours PRN Temp greater or equal to 38C (100.4F), Moderate Pain (4 - 6)  dextrose Oral Gel 15 Gram(s) Oral once PRN Blood Glucose LESS THAN 70 milliGRAM(s)/deciliter  melatonin 3 milliGRAM(s) Oral at bedtime PRN Insomnia  sodium chloride 0.9% lock flush 10 milliLiter(s) IV Push every 1 hour PRN Pre/post blood products, medications, blood draw, and to maintain line patency      LABS:                        8.2    11.06 )-----------( 426      ( 23 May 2024 00:15 )             25.7     05-23    135  |  95<L>  |  28<H>  ----------------------------<  203<H>  4.0   |  27  |  3.29<H>    Ca    8.1<L>      23 May 2024 00:15  Phos  1.9     05-23  Mg     2.30     05-23    TPro  x   /  Alb  1.9<L>  /  TBili  x   /  DBili  x   /  AST  x   /  ALT  x   /  AlkPhos  x   05-22    PT/INR - ( 23 May 2024 00:15 )   PT: 13.4 sec;   INR: 1.19 ratio         PTT - ( 23 May 2024 00:15 )  PTT:95.7 sec  Urinalysis Basic - ( 23 May 2024 00:15 )    Color: x / Appearance: x / SG: x / pH: x  Gluc: 203 mg/dL / Ketone: x  / Bili: x / Urobili: x   Blood: x / Protein: x / Nitrite: x   Leuk Esterase: x / RBC: x / WBC x   Sq Epi: x / Non Sq Epi: x / Bacteria: x    PAST MEDICAL & SURGICAL HISTORY:  Diabetes    Benign essential HTN    HLD (hyperlipidemia)    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    ESRD on hemodialysis    Arteriovenous fistula    FAMILY HISTORY:  FHx: diabetes mellitus (Father, Aunt)    Social History:    RADIOLOGY:  [ ] Reviewed and interpreted by me    PULMONARY FUNCTION TESTS:    EKG: CHIEF COMPLAINT: Patient is a 73y old  Male who presents with a chief complaint of Unstable angina, abn EKG (23 May 2024 07:56)    Interval Events: None reported overnight. Clinically unchanged. Pt reports no new complaints. No new nursing issues.                        Initially scheduled for Fistulogram today with Vascular, however case got cancelled and rescheduled for tomorrow. (emergent vascular sx procedure)                        Will make NPO p MN tonight, and preop am labs ordered.     REVIEW OF SYSTEMS:  See above   [x] Unable to fully assess ROS because poor phonation    Mode: AC/ CMV (Assist Control/ Continuous Mandatory Ventilation), RR (machine): 12, TV (machine): 500, FiO2: 30, PEEP: 5, ITime: 0.67, MAP: 9, PIP: 30    OBJECTIVE:  ICU Vital Signs Last 24 Hrs  T(C): 37.2 (23 May 2024 04:00), Max: 37.3 (22 May 2024 16:18)  T(F): 98.9 (23 May 2024 04:00), Max: 99.2 (22 May 2024 16:18)  HR: 60 (23 May 2024 09:17) (57 - 64)  BP: 162/52 (23 May 2024 04:00) (109/69 - 162/52)  BP(mean): --  ABP: --  ABP(mean): --  RR: 19 (23 May 2024 04:00) (18 - 20)  SpO2: 100% (23 May 2024 09:17) (96% - 100%)    O2 Parameters below as of 23 May 2024 09:17  Patient On (Oxygen Delivery Method): ventilator    Mode: AC/ CMV (Assist Control/ Continuous Mandatory Ventilation), RR (machine): 12, TV (machine): 500, FiO2: 30, PEEP: 5, ITime: 0.67, MAP: 9, PIP: 30    05-22 @ 07:01  -  05-23 @ 07:00  --------------------------------------------------------  IN: 1397 mL / OUT: 2200 mL / NET: -803 mL    CAPILLARY BLOOD GLUCOSE    POCT Blood Glucose.: 112 mg/dL (23 May 2024 05:00)    HOSPITAL MEDICATIONS:  MEDICATIONS  (STANDING):  albuterol/ipratropium for Nebulization 3 milliLiter(s) Nebulizer every 12 hours  aspirin  chewable 81 milliGRAM(s) Oral daily  atorvastatin 20 milliGRAM(s) Oral at bedtime  carvedilol 12.5 milliGRAM(s) Oral every 12 hours  chlorhexidine 0.12% Liquid 15 milliLiter(s) Oral Mucosa every 12 hours  chlorhexidine 2% Cloths 1 Application(s) Topical <User Schedule>  dextrose 10% Bolus 125 milliLiter(s) IV Bolus once  dextrose 5%. 1000 milliLiter(s) (50 mL/Hr) IV Continuous <Continuous>  dextrose 5%. 1000 milliLiter(s) (100 mL/Hr) IV Continuous <Continuous>  dextrose 50% Injectable 25 Gram(s) IV Push once  dextrose 50% Injectable 12.5 Gram(s) IV Push once  epoetin reilly (EPOGEN) Injectable 73605 Unit(s) IV Push <User Schedule>  ferrous    sulfate Liquid 300 milliGRAM(s) Enteral Tube daily  glucagon  Injectable 1 milliGRAM(s) IntraMuscular once  heparin  Infusion. 1100 Unit(s)/Hr (11 mL/Hr) IV Continuous <Continuous>  hydrALAZINE 100 milliGRAM(s) Oral three times a day  insulin lispro (ADMELOG) corrective regimen sliding scale   SubCutaneous every 6 hours  insulin NPH human recombinant 4 Unit(s) SubCutaneous every 6 hours  piperacillin/tazobactam IVPB.. 3.375 Gram(s) IV Intermittent every 12 hours  sodium bicarbonate 650 milliGRAM(s) Oral every 8 hours  sodium chloride 3%  Inhalation 4 milliLiter(s) Inhalation every 12 hours    MEDICATIONS  (PRN):  acetaminophen   Oral Liquid .. 650 milliGRAM(s) Oral every 6 hours PRN Temp greater or equal to 38C (100.4F), Moderate Pain (4 - 6)  dextrose Oral Gel 15 Gram(s) Oral once PRN Blood Glucose LESS THAN 70 milliGRAM(s)/deciliter  melatonin 3 milliGRAM(s) Oral at bedtime PRN Insomnia  sodium chloride 0.9% lock flush 10 milliLiter(s) IV Push every 1 hour PRN Pre/post blood products, medications, blood draw, and to maintain line patency    PHYSICAL EXAM  General: Laying in bed comfortably, NAD  HEENT: AT/NC, +Trach, area c/d/i, +R IJ Shiley noted, Neck supple, no JVD  HEART: +s1, s2   LUNGS: Non labored breathing. +coarse breath sounds noted  GI: +BS, soft, +PEG, area c/d/i, no peritoneal signs noted    MSK: +trace edema b/l lower ext, no swelling   Derm: as per RN assessment sheet   NEURO: Alert and awake, non focal, following commands    LABS:                        8.2    11.06 )-----------( 426      ( 23 May 2024 00:15 )             25.7     05-23    135  |  95<L>  |  28<H>  ----------------------------<  203<H>  4.0   |  27  |  3.29<H>    Ca    8.1<L>      23 May 2024 00:15  Phos  1.9     05-23  Mg     2.30     05-23    TPro  x   /  Alb  1.9<L>  /  TBili  x   /  DBili  x   /  AST  x   /  ALT  x   /  AlkPhos  x   05-22    PT/INR - ( 23 May 2024 00:15 )   PT: 13.4 sec;   INR: 1.19 ratio      PTT - ( 23 May 2024 00:15 )  PTT:95.7 sec  Urinalysis Basic - ( 23 May 2024 00:15 )    Color: x / Appearance: x / SG: x / pH: x  Gluc: 203 mg/dL / Ketone: x  / Bili: x / Urobili: x   Blood: x / Protein: x / Nitrite: x   Leuk Esterase: x / RBC: x / WBC x   Sq Epi: x / Non Sq Epi: x / Bacteria: x    PAST MEDICAL & SURGICAL HISTORY:  Diabetes    Benign essential HTN    HLD (hyperlipidemia)    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    ESRD on hemodialysis    Arteriovenous fistula    FAMILY HISTORY:  FHx: diabetes mellitus (Father, Aunt)    Social History:    RADIOLOGY:  [ ] Reviewed and interpreted by me    PULMONARY FUNCTION TESTS:    EKG:

## 2024-05-23 NOTE — PROGRESS NOTE ADULT - NS ATTEND AMEND GEN_ALL_CORE FT
Patient is a 72 yo M w/ ESRD on dialysis, HTN and T2DM who was admitted with demand ischemia versus NSTEMI.  Hospital course was complicated by baclofen toxicity requiring intubation and MICU transfer, left talya and cerebellum stroke, aspiration and B cereus bacteremia and RLE DVT. Now s/p Trach 5/16    #Respiratory failure  #Trach dependence  #DVT  #ESRD  - c/w heparin gtt, will hold on call to OR as per Vascular  - c/w mechanical ventilatory support, tolerating PSV 10/5 today  - Completed course of Vanc for B cereus bacteremia  - Complete empiric course of Zosyn x 7 days for recent fever  - Awaiting fistulogram per vascular, planned for today. Patient is medically optimized for procedure.  - HD as per Renal  - Plan for possible Permacath pending fistulogram    Kody Rivas MD  Pulmonary & Critical Care

## 2024-05-23 NOTE — PROGRESS NOTE ADULT - ASSESSMENT
73M w/ PMHx hypertension, ESRD on HD via R radiocephalic fistula (MWF), DM, HTN, p/w hiccups and peaked T waves, admitted to MICU for c/f baclofen toxicity. Patient received 1x HD on admission. R femoral shiley removed 5/2, had 2 RRT for AMS and failed HD via AVF 5/3. S/p emergent R IJ shiley placement 5/3, started CRRT which is now transitioned back to iHD. Vascular planning RUE fistulogram when medically optimized. Extubated to HFNC then reintubated 5/12 for c/f aspiration w/ hypoxic resp failure. S/p trach 5/14. Downgraded from MICU to RCU 5/16.    Recommendations:   - Planning for fistulogram today  - HOLD tube feeds at midnight  - hold heparin gtt on call to the OR  - hep gtt for nonocclusive R common iliac DVT  - Consent signed by wife, in chart  - Med/cards/ID appreciate documented risk stratification  - Continue HD via replaced R IJ shiley  - Global care per primary     Vascular Surgery  [id number]

## 2024-05-23 NOTE — PROGRESS NOTE ADULT - SUBJECTIVE AND OBJECTIVE BOX
[facility], Division of Infectious Diseases  AUGUST Carbajal Y. Patel, S. Shah, G. Brian  [phone]  ([phone] - weekdays after 5pm and weekends)    Name: JIMMY BALND  Age/Gender: 73y Male  MRN: [id number]    Interval History:  Notes reviewed.   No concerning overnight events.  Afebrile.     Allergies: No Known Allergies      Objective:  Vitals:   T(F): 98.7 (05-23-24 @ 08:00), Max: 99.2 (05-22-24 @ 16:18)  HR: 59 (05-23-24 @ 10:42) (59 - 64)  BP: 151/86 (05-23-24 @ 09:41) (109/69 - 183/53)  RR: 12 (05-23-24 @ 08:00) (12 - 19)  SpO2: 99% (05-23-24 @ 10:42) (98% - 100%)  Physical Examination:  General: no acute distress  HEENT: anicteric, tracheostomy  Lungs: clear to auscultation anteriorly  Heart: S1, S2, normal rate, RIJ shiley  Abdomen: Soft. ND. NT  Extremities: No LE edema.   Skin: Warm. Dry.     Laboratory Studies:  CBC:                       8.2    11.06 )-----------( 426      ( 23 May 2024 00:15 )             25.7     WBC Trend:  11.06 05-23-24 @ 00:15  11.06 05-22-24 @ 07:25  9.71 05-22-24 @ 05:30  10.27 05-21-24 @ 05:25  9.06 05-20-24 @ 06:31  10.08 05-19-24 @ 05:50  7.35 05-18-24 @ 03:45  7.71 05-17-24 @ 22:30  10.59 05-17-24 @ 06:00  9.46 05-16-24 @ 16:55    CMP: 05-23    135  |  95<L>  |  28<H>  ----------------------------<  203<H>  4.0   |  27  |  3.29<H>    Ca    8.1<L>      23 May 2024 00:15  Phos  1.9     05-23  Mg     2.30     05-23    TPro  x   /  Alb  1.9<L>  /  TBili  x   /  DBili  x   /  AST  x   /  ALT  x   /  AlkPhos  x   05-22      LIVER FUNCTIONS - ( 22 May 2024 05:30 )  Alb: 1.9 g/dL / Pro: x     / ALK PHOS: x     / ALT: x     / AST: x     / GGT: x           Vancomycin Level, Random: 21.8 ug/mL (05-21-24 @ 05:25)  Vancomycin Level, Random: 17.1 ug/mL (05-20-24 @ 06:31)  Vancomycin Level, Random: 18.6 ug/mL (05-19-24 @ 05:50)    Urinalysis Basic - ( 23 May 2024 00:15 )    Color: x / Appearance: x / SG: x / pH: x  Gluc: 203 mg/dL / Ketone: x  / Bili: x / Urobili: x   Blood: x / Protein: x / Nitrite: x   Leuk Esterase: x / RBC: x / WBC x   Sq Epi: x / Non Sq Epi: x / Bacteria: x      Microbiology: reviewed     Culture - Sputum (collected 05-18-24 @ 04:30)  Source: .Sputum Sputum  Gram Stain (05-18-24 @ 21:05):    Few polymorphonuclear leukocytes per low power field    No Squamous epithelial cells per low power field    No organisms seen per oil power field  Final Report (05-20-24 @ 07:02):    Normal Respiratory Jocelyn present    Culture - Blood (collected 05-18-24 @ 04:05)  Source: .Blood Blood-Venous  Final Report (05-23-24 @ 11:00):    No growth at 5 days    Culture - Blood (collected 05-18-24 @ 03:45)  Source: .Blood Blood-Venous  Final Report (05-23-24 @ 09:00):    No growth at 5 days    Culture - Fungal, Bronchial (collected 05-12-24 @ 15:06)  Source: .Bronchial Bronchial Lavage  Preliminary Report (05-22-24 @ 23:02):    No fungus isolated at 1 week.    Culture - Acid Fast - Bronchial w/Smear (collected 05-12-24 @ 15:06)  Source: .Bronchial Bronchial Lavage  Preliminary Report (05-22-24 @ 23:08):    No acid-fast bacilli isolated at 1 week. ****Acid-fast cultures are held    for 6 weeks.****    Culture - Bronchial (collected 05-12-24 @ 15:06)  Source: .Bronchial Bronchial Lavage  Gram Stain (05-12-24 @ 22:40):    Moderate polymorphonuclear leukocytes per low power field    Rare Squamous epithelial cells per low power field    Rare Gram Positive Cocci in Clusters per oil power field  Final Report (05-14-24 @ 08:10):    Normal Respiratory Jocelyn present    Culture - Blood (collected 05-11-24 @ 14:00)  Source: .Blood Blood-Peripheral  Final Report (05-16-24 @ 19:01):    No growth at 5 days    Culture - Blood (collected 05-11-24 @ 14:00)  Source: .Blood Blood-Peripheral  Final Report (05-16-24 @ 19:01):    No growth at 5 days    Culture - Sputum (collected 05-10-24 @ 16:45)  Source: .Sputum Sputum  Gram Stain (05-10-24 @ 22:57):    Rare polymorphonuclear leukocytes per low power field    Rare Squamous epithelial cells per low power field    Rare Gram Negative Rods per oil power field  Final Report (05-12-24 @ 16:49):    Normal Respiratory Jocelyn present    Culture - Blood (collected 05-10-24 @ 04:00)  Source: .Blood Blood-Peripheral  Final Report (05-15-24 @ 07:01):    No growth at 5 days    Culture - Blood (collected 05-10-24 @ 03:45)  Source: .Blood Blood-Peripheral  Gram Stain (05-10-24 @ 19:34):    Growth in anaerobic bottle: Gram Positive Rods  Final Report (05-11-24 @ 14:47):    Growth in anaerobic bottle: Bacillus cereus "Susceptibilities not    performed"    Direct identification is available within approximately 3-5    hours either by Blood Panel Multiplexed PCR or Direct    MALDI-TOF. Details: [email or web address]  Organism: Blood Culture PCR (05-11-24 @ 14:47)  Organism: Blood Culture PCR (05-11-24 @ 14:47)      Method Type: PCR      -  Blood PCR Panel: NEG        Radiology: reviewed     Medications:  acetaminophen   Oral Liquid .. 650 milliGRAM(s) Oral every 6 hours PRN  albuterol/ipratropium for Nebulization 3 milliLiter(s) Nebulizer every 12 hours  aspirin  chewable 81 milliGRAM(s) Oral daily  atorvastatin 20 milliGRAM(s) Oral at bedtime  carvedilol 12.5 milliGRAM(s) Oral every 12 hours  chlorhexidine 0.12% Liquid 15 milliLiter(s) Oral Mucosa every 12 hours  chlorhexidine 2% Cloths 1 Application(s) Topical <User Schedule>  dextrose 10% Bolus 125 milliLiter(s) IV Bolus once  dextrose 5%. 1000 milliLiter(s) IV Continuous <Continuous>  dextrose 5%. 1000 milliLiter(s) IV Continuous <Continuous>  dextrose 50% Injectable 12.5 Gram(s) IV Push once  dextrose 50% Injectable 25 Gram(s) IV Push once  dextrose Oral Gel 15 Gram(s) Oral once PRN  epoetin reilly (EPOGEN) Injectable 41345 Unit(s) IV Push <User Schedule>  ferrous    sulfate Liquid 300 milliGRAM(s) Enteral Tube daily  glucagon  Injectable 1 milliGRAM(s) IntraMuscular once  heparin  Infusion. 1100 Unit(s)/Hr IV Continuous <Continuous>  hydrALAZINE 100 milliGRAM(s) Oral three times a day  insulin lispro (ADMELOG) corrective regimen sliding scale   SubCutaneous every 6 hours  insulin NPH human recombinant 4 Unit(s) SubCutaneous every 6 hours  melatonin 3 milliGRAM(s) Oral at bedtime PRN  piperacillin/tazobactam IVPB.. 3.375 Gram(s) IV Intermittent every 12 hours  potassium phosphate / sodium phosphate Powder (PHOS-NaK) 1 Packet(s) Oral two times a day  sodium bicarbonate 650 milliGRAM(s) Oral every 8 hours  sodium chloride 0.9% lock flush 10 milliLiter(s) IV Push every 1 hour PRN  sodium chloride 3%  Inhalation 4 milliLiter(s) Inhalation every 12 hours    Antimicrobials:  piperacillin/tazobactam IVPB.. 3.375 Gram(s) IV Intermittent every 12 hours

## 2024-05-24 LAB
ANION GAP SERPL CALC-SCNC: 14 MMOL/L — SIGNIFICANT CHANGE UP (ref 7–14)
APTT BLD: 116.1 SEC — HIGH (ref 24.5–35.6)
APTT BLD: 118.8 SEC — SIGNIFICANT CHANGE UP (ref 24.5–35.6)
APTT BLD: 121 SEC — CRITICAL HIGH (ref 24.5–35.6)
BUN SERPL-MCNC: 40 MG/DL — HIGH (ref 7–23)
CALCIUM SERPL-MCNC: 7.6 MG/DL — LOW (ref 8.4–10.5)
CHLORIDE SERPL-SCNC: 94 MMOL/L — LOW (ref 98–107)
CO2 SERPL-SCNC: 26 MMOL/L — SIGNIFICANT CHANGE UP (ref 22–31)
CREAT SERPL-MCNC: 4.64 MG/DL — HIGH (ref 0.5–1.3)
EGFR: 13 ML/MIN/1.73M2 — LOW
GLUCOSE BLDC GLUCOMTR-MCNC: 111 MG/DL — HIGH (ref 70–99)
GLUCOSE BLDC GLUCOMTR-MCNC: 147 MG/DL — HIGH (ref 70–99)
GLUCOSE BLDC GLUCOMTR-MCNC: 165 MG/DL — HIGH (ref 70–99)
GLUCOSE SERPL-MCNC: 151 MG/DL — HIGH (ref 70–99)
HCT VFR BLD CALC: 25.5 % — LOW (ref 39–50)
HGB BLD-MCNC: 8 G/DL — LOW (ref 13–17)
INR BLD: 1.27 RATIO — HIGH (ref 0.85–1.18)
MAGNESIUM SERPL-MCNC: 2.4 MG/DL — SIGNIFICANT CHANGE UP (ref 1.6–2.6)
MCHC RBC-ENTMCNC: 20.4 PG — LOW (ref 27–34)
MCHC RBC-ENTMCNC: 31.4 GM/DL — LOW (ref 32–36)
MCV RBC AUTO: 64.9 FL — LOW (ref 80–100)
NRBC # BLD: 1 /100 WBCS — HIGH (ref 0–0)
NRBC # FLD: 0.16 K/UL — HIGH (ref 0–0)
PHOSPHATE SERPL-MCNC: 3.3 MG/DL — SIGNIFICANT CHANGE UP (ref 2.5–4.5)
PLATELET # BLD AUTO: 467 K/UL — HIGH (ref 150–400)
POTASSIUM SERPL-MCNC: 4.3 MMOL/L — SIGNIFICANT CHANGE UP (ref 3.5–5.3)
POTASSIUM SERPL-SCNC: 4.3 MMOL/L — SIGNIFICANT CHANGE UP (ref 3.5–5.3)
PROTHROM AB SERPL-ACNC: 14.2 SEC — HIGH (ref 9.5–13)
RBC # BLD: 3.93 M/UL — LOW (ref 4.2–5.8)
RBC # FLD: 21.2 % — HIGH (ref 10.3–14.5)
SODIUM SERPL-SCNC: 134 MMOL/L — LOW (ref 135–145)
WBC # BLD: 11.93 K/UL — HIGH (ref 3.8–10.5)
WBC # FLD AUTO: 11.93 K/UL — HIGH (ref 3.8–10.5)

## 2024-05-24 PROCEDURE — 99233 SBSQ HOSP IP/OBS HIGH 50: CPT

## 2024-05-24 RX ORDER — HEPARIN SODIUM 5000 [USP'U]/ML
900 INJECTION INTRAVENOUS; SUBCUTANEOUS
Qty: 25000 | Refills: 0 | Status: DISCONTINUED | OUTPATIENT
Start: 2024-05-24 | End: 2024-05-25

## 2024-05-24 RX ORDER — PIPERACILLIN AND TAZOBACTAM 4; .5 G/20ML; G/20ML
3.38 INJECTION, POWDER, LYOPHILIZED, FOR SOLUTION INTRAVENOUS EVERY 12 HOURS
Refills: 0 | Status: DISCONTINUED | OUTPATIENT
Start: 2024-05-24 | End: 2024-05-26

## 2024-05-24 RX ADMIN — CHLORHEXIDINE GLUCONATE 15 MILLILITER(S): 213 SOLUTION TOPICAL at 05:17

## 2024-05-24 RX ADMIN — ERYTHROPOIETIN 10000 UNIT(S): 10000 INJECTION, SOLUTION INTRAVENOUS; SUBCUTANEOUS at 18:50

## 2024-05-24 RX ADMIN — Medication 100 MILLIGRAM(S): at 21:08

## 2024-05-24 RX ADMIN — Medication 3 MILLILITER(S): at 09:18

## 2024-05-24 RX ADMIN — Medication 81 MILLIGRAM(S): at 11:14

## 2024-05-24 RX ADMIN — Medication 300 MILLIGRAM(S): at 11:14

## 2024-05-24 RX ADMIN — Medication 100 MILLIGRAM(S): at 11:14

## 2024-05-24 RX ADMIN — HEPARIN SODIUM 1000 UNIT(S)/HR: 5000 INJECTION INTRAVENOUS; SUBCUTANEOUS at 18:24

## 2024-05-24 RX ADMIN — SODIUM CHLORIDE 4 MILLILITER(S): 9 INJECTION INTRAMUSCULAR; INTRAVENOUS; SUBCUTANEOUS at 20:36

## 2024-05-24 RX ADMIN — CARVEDILOL PHOSPHATE 12.5 MILLIGRAM(S): 80 CAPSULE, EXTENDED RELEASE ORAL at 06:15

## 2024-05-24 RX ADMIN — HEPARIN SODIUM 900 UNIT(S)/HR: 5000 INJECTION INTRAVENOUS; SUBCUTANEOUS at 19:36

## 2024-05-24 RX ADMIN — Medication 650 MILLIGRAM(S): at 21:08

## 2024-05-24 RX ADMIN — SODIUM CHLORIDE 4 MILLILITER(S): 9 INJECTION INTRAMUSCULAR; INTRAVENOUS; SUBCUTANEOUS at 09:19

## 2024-05-24 RX ADMIN — HEPARIN SODIUM 1000 UNIT(S)/HR: 5000 INJECTION INTRAVENOUS; SUBCUTANEOUS at 05:13

## 2024-05-24 RX ADMIN — Medication 650 MILLIGRAM(S): at 14:00

## 2024-05-24 RX ADMIN — Medication 100 MILLIGRAM(S): at 04:16

## 2024-05-24 RX ADMIN — PIPERACILLIN AND TAZOBACTAM 25 GRAM(S): 4; .5 INJECTION, POWDER, LYOPHILIZED, FOR SOLUTION INTRAVENOUS at 17:20

## 2024-05-24 RX ADMIN — ATORVASTATIN CALCIUM 20 MILLIGRAM(S): 80 TABLET, FILM COATED ORAL at 21:08

## 2024-05-24 RX ADMIN — Medication 2: at 11:20

## 2024-05-24 RX ADMIN — HEPARIN SODIUM 1000 UNIT(S)/HR: 5000 INJECTION INTRAVENOUS; SUBCUTANEOUS at 19:16

## 2024-05-24 RX ADMIN — CHLORHEXIDINE GLUCONATE 1 APPLICATION(S): 213 SOLUTION TOPICAL at 05:16

## 2024-05-24 RX ADMIN — HEPARIN SODIUM 1100 UNIT(S)/HR: 5000 INJECTION INTRAVENOUS; SUBCUTANEOUS at 01:39

## 2024-05-24 RX ADMIN — HEPARIN SODIUM 900 UNIT(S)/HR: 5000 INJECTION INTRAVENOUS; SUBCUTANEOUS at 23:50

## 2024-05-24 RX ADMIN — Medication 650 MILLIGRAM(S): at 05:16

## 2024-05-24 RX ADMIN — CHLORHEXIDINE GLUCONATE 15 MILLILITER(S): 213 SOLUTION TOPICAL at 17:20

## 2024-05-24 RX ADMIN — CARVEDILOL PHOSPHATE 12.5 MILLIGRAM(S): 80 CAPSULE, EXTENDED RELEASE ORAL at 21:08

## 2024-05-24 RX ADMIN — Medication 3 MILLILITER(S): at 20:36

## 2024-05-24 RX ADMIN — PIPERACILLIN AND TAZOBACTAM 25 GRAM(S): 4; .5 INJECTION, POWDER, LYOPHILIZED, FOR SOLUTION INTRAVENOUS at 05:14

## 2024-05-24 NOTE — PROGRESS NOTE ADULT - SUBJECTIVE AND OBJECTIVE BOX
TEAM [ *C* ] Surgery Daily Progress Note  =====================================================    SUBJECTIVE: Patient seen and examined at bedside on AM rounds. Resting in bed, will go to OR today.     ALLERGIES:  No Known Allergies      --------------------------------------------------------------------------------------    MEDICATIONS:    Neurologic Medications  acetaminophen   Oral Liquid .. 650 milliGRAM(s) Oral every 6 hours PRN Temp greater or equal to 38C (100.4F), Moderate Pain (4 - 6)  melatonin 3 milliGRAM(s) Oral at bedtime PRN Insomnia    Respiratory Medications  albuterol/ipratropium for Nebulization 3 milliLiter(s) Nebulizer every 12 hours  sodium chloride 3%  Inhalation 4 milliLiter(s) Inhalation every 12 hours    Cardiovascular Medications  carvedilol 12.5 milliGRAM(s) Oral every 12 hours  hydrALAZINE 100 milliGRAM(s) Oral three times a day    Gastrointestinal Medications  dextrose 10% Bolus 125 milliLiter(s) IV Bolus once  dextrose 5%. 1000 milliLiter(s) IV Continuous <Continuous>  dextrose 5%. 1000 milliLiter(s) IV Continuous <Continuous>  ferrous    sulfate Liquid 300 milliGRAM(s) Enteral Tube daily  sodium bicarbonate 650 milliGRAM(s) Oral every 8 hours  sodium chloride 0.9% lock flush 10 milliLiter(s) IV Push every 1 hour PRN Pre/post blood products, medications, blood draw, and to maintain line patency    Genitourinary Medications    Hematologic/Oncologic Medications  aspirin  chewable 81 milliGRAM(s) Oral daily  epoetin reilly (EPOGEN) Injectable 64450 Unit(s) IV Push <User Schedule>  heparin  Infusion. 1100 Unit(s)/Hr IV Continuous <Continuous>    Antimicrobial/Immunologic Medications    Endocrine/Metabolic Medications  atorvastatin 20 milliGRAM(s) Oral at bedtime  dextrose 50% Injectable 12.5 Gram(s) IV Push once  dextrose 50% Injectable 25 Gram(s) IV Push once  dextrose Oral Gel 15 Gram(s) Oral once PRN Blood Glucose LESS THAN 70 milliGRAM(s)/deciliter  glucagon  Injectable 1 milliGRAM(s) IntraMuscular once  insulin lispro (ADMELOG) corrective regimen sliding scale   SubCutaneous every 6 hours  insulin NPH human recombinant 4 Unit(s) SubCutaneous every 6 hours    Topical/Other Medications  chlorhexidine 0.12% Liquid 15 milliLiter(s) Oral Mucosa every 12 hours  chlorhexidine 2% Cloths 1 Application(s) Topical <User Schedule>    --------------------------------------------------------------------------------------    VITAL SIGNS:  T(C): 37.1 (05-24-24 @ 08:00), Max: 37.2 (05-23-24 @ 11:00)  HR: 57 (05-24-24 @ 08:00) (57 - 90)  BP: 161/58 (05-24-24 @ 08:00) (150/55 - 185/58)  RR: 18 (05-24-24 @ 08:00) (16 - 22)  SpO2: 100% (05-24-24 @ 08:00) (99% - 100%)  --------------------------------------------------------------------------------------    INS AND OUTS:    05-23-24 @ 07:01  -  05-24-24 @ 07:00  --------------------------------------------------------  IN: 897 mL / OUT: 0 mL / NET: 897 mL      --------------------------------------------------------------------------------------      EXAM    General: NAD, resting in bed comfortably   Cardiac: regular rate, warm and well perfused  Respiratory: Trached on ventilator  Extremities: RUE with palpable thrill and palpable radial and ulnar pulses       --------------------------------------------------------------------------------------    LABS                          8.0    11.93 )-----------( 467      ( 24 May 2024 03:30 )             25.5     05-24    134<L>  |  94<L>  |  40<H>  ----------------------------<  151<H>  4.3   |  26  |  4.64<H>    Ca    7.6<L>      24 May 2024 03:30  Phos  3.3     05-24  Mg     2.40     05-24      PT/INR - ( 24 May 2024 03:30 )   PT: 14.2 sec;   INR: 1.27 ratio         PTT - ( 24 May 2024 03:30 )  PTT:121.0 sec  Urinalysis Basic - ( 24 May 2024 03:30 )    Color: x / Appearance: x / SG: x / pH: x  Gluc: 151 mg/dL / Ketone: x  / Bili: x / Urobili: x   Blood: x / Protein: x / Nitrite: x   Leuk Esterase: x / RBC: x / WBC x   Sq Epi: x / Non Sq Epi: x / Bacteria: x      No Known Allergies    T(C): 37.1 (05-24-24 @ 08:00), Max: 37.2 (05-23-24 @ 11:00)  HR: 57 (05-24-24 @ 08:00) (57 - 90)  BP: 161/58 (05-24-24 @ 08:00) (150/55 - 185/58)  RR: 18 (05-24-24 @ 08:00) (16 - 22)  SpO2: 100% (05-24-24 @ 08:00) (99% - 100%)    05-23-24 @ 07:01  -  05-24-24 @ 07:00  --------------------------------------------------------  IN: 897 mL / OUT: 0 mL / NET: 897 mL

## 2024-05-24 NOTE — PROGRESS NOTE ADULT - ASSESSMENT
73-year-old male past medical history hypertension, ESRD on dialysis Monday Wednesday Friday, diabetes insulin-dependent presents with hiccups patient endorses persistent hiccups for the last 2 days.   found to have peaked T waves, a new finding. denied dizziness, N/V, denies CP, palps, abd pain  Upon admission seen by CArd, renal and GI  found to have a distended GB prob 2/2 gastroparesis, was started on Reglan  has received 4 doses of baclofen since 4/30 2/2 hiccups, last dose on 5/1 at 5 am  had received Haldol for agitation at 11 pm on 4/30, AMS observed in pm of 5/1  AMS ongoing, RRT x 2 called  now transferred to MICU for encephalopathy requiring  airway protection on 5/2,  intubated for airway protection, was on  propofol and pressors. now off  toxicology consulted upon dx of encephalopathy, not impressed AMS 2/2 Haldol nor baclofen . AMS was 2/2 acute CVA   HD was not done timely until femoral shiley was placed 2/2 clotted RUE AVF. now removed and RIJ shiley placed on 5/3  has been nonverbal since pm 5/1.     MR brain 5/6 c/w L pontine and L cerebellar acute infarcts, no hemorrhage . as per neuro: embolic in nature.   encephalopathy probably 2/2 acute CVA, now follows commands appropriately off sedation.   no SZ focus on EEG,   off CRRT,  HD resumed as per renal.   remains intubated, now trached  off keppra  AC resumed, on Heparin drip for R common iliac DVT  completed 5 days of empiric ZOSYN on 5/7, shortly after became septic again after an extubation trial. bacteremia w B. cereus, on Vanc through 5/20 as per ID    s/p trache placement by pulm,   IR unable to find a window for G-J tube. PEG placed by surgery 5/17, resume feeds when cleared by surgery  HD via R IJ.  Tmax overnight( 5/18)  101, cxr c/w RLL PNA, now on Zosyn, blood Cx s sent. remains on Vanco for B. cereus bacteremia   leukocytosis resolved  EKG changes on 5/3 c/w NSTEMI loaded w DAPT , was  on Heparin drip x 48 hrs. rpt TTE is unchanged  ID, Neuro , renal, cardiology following.  clotted RUE AVF. fistulogram 5/24  HD via RIght IJ Shiley.   LP done, no sign of meningitis.   NEUROLOGY     - CTH without acute findings   - LP done, no acute findings  - MR brain w acute infarcts: L talya and L cerebellum, nonhemorrhagic  - no Sz focus on EEG    CARDIOVASCULAR   - ptn never had CP. just peaked T waves. was awaiting ischemic study w nucl stress test  - TTE showing EF 72%, rpt unchanged  - EKG changes on 5/3, loaded w DAPT now on Heparin drip 2/2 DVT    GI  - PEG placed, npo w tube feeds  - Gastroparesis       RENAL   -  HD as per renal  - via R IJ shiley  -  fistula study of RUE  as per Vascular : 5/24  - permacath some time after fistulogram       INFECTIOUS DISEASE     completed a course of Zosyn, then became septic, bacteremic a B. cereus, completed 3 days of Meropenem, completed vanc through 5/21  on Zosyn since 5/18 for RLL PNA, afebrile, completed today    ENDOCRINE   - DM  - cw ISS    DVT  - RLE , on Heparin drip    GOC:  Full code

## 2024-05-24 NOTE — PROGRESS NOTE ADULT - SUBJECTIVE AND OBJECTIVE BOX
Patient is a 73y old  Male who presents with a chief complaint of Unstable angina, abn EKG (24 May 2024 14:51)      SUBJECTIVE / OVERNIGHT EVENTS: fistulogram today    MEDICATIONS  (STANDING):  albuterol/ipratropium for Nebulization 3 milliLiter(s) Nebulizer every 12 hours  aspirin  chewable 81 milliGRAM(s) Oral daily  atorvastatin 20 milliGRAM(s) Oral at bedtime  carvedilol 12.5 milliGRAM(s) Oral every 12 hours  chlorhexidine 0.12% Liquid 15 milliLiter(s) Oral Mucosa every 12 hours  chlorhexidine 2% Cloths 1 Application(s) Topical <User Schedule>  dextrose 10% Bolus 125 milliLiter(s) IV Bolus once  dextrose 5%. 1000 milliLiter(s) (100 mL/Hr) IV Continuous <Continuous>  dextrose 5%. 1000 milliLiter(s) (50 mL/Hr) IV Continuous <Continuous>  dextrose 50% Injectable 12.5 Gram(s) IV Push once  dextrose 50% Injectable 25 Gram(s) IV Push once  epoetin reilly (EPOGEN) Injectable 28435 Unit(s) IV Push <User Schedule>  ferrous    sulfate Liquid 300 milliGRAM(s) Enteral Tube daily  glucagon  Injectable 1 milliGRAM(s) IntraMuscular once  heparin  Infusion. 900 Unit(s)/Hr (9 mL/Hr) IV Continuous <Continuous>  hydrALAZINE 100 milliGRAM(s) Oral three times a day  insulin lispro (ADMELOG) corrective regimen sliding scale   SubCutaneous every 6 hours  insulin NPH human recombinant 4 Unit(s) SubCutaneous every 6 hours  piperacillin/tazobactam IVPB.. 3.375 Gram(s) IV Intermittent every 12 hours  sodium bicarbonate 650 milliGRAM(s) Oral every 8 hours  sodium chloride 3%  Inhalation 4 milliLiter(s) Inhalation every 12 hours    MEDICATIONS  (PRN):  acetaminophen   Oral Liquid .. 650 milliGRAM(s) Oral every 6 hours PRN Temp greater or equal to 38C (100.4F), Moderate Pain (4 - 6)  dextrose Oral Gel 15 Gram(s) Oral once PRN Blood Glucose LESS THAN 70 milliGRAM(s)/deciliter  melatonin 3 milliGRAM(s) Oral at bedtime PRN Insomnia  sodium chloride 0.9% lock flush 10 milliLiter(s) IV Push every 1 hour PRN Pre/post blood products, medications, blood draw, and to maintain line patency      Vital Signs Last 24 Hrs  T(F): 98.8 (05-24-24 @ 20:00), Max: 98.8 (05-24-24 @ 20:00)  HR: 65 (05-24-24 @ 20:00) (57 - 66)  BP: 98/52 (05-24-24 @ 20:00) (98/52 - 174/66)  RR: 19 (05-24-24 @ 20:00) (16 - 22)  SpO2: 100% (05-24-24 @ 20:00) (100% - 100%)  Telemetry:   CAPILLARY BLOOD GLUCOSE      POCT Blood Glucose.: 111 mg/dL (24 May 2024 17:32)  POCT Blood Glucose.: 165 mg/dL (24 May 2024 11:19)  POCT Blood Glucose.: 147 mg/dL (24 May 2024 05:43)  POCT Blood Glucose.: 222 mg/dL (23 May 2024 23:23)    I&O's Summary    23 May 2024 07:01  -  24 May 2024 07:00  --------------------------------------------------------  IN: 897 mL / OUT: 0 mL / NET: 897 mL    24 May 2024 07:01  -  24 May 2024 21:41  --------------------------------------------------------  IN: 1120 mL / OUT: 2400 mL / NET: -1280 mL        PHYSICAL EXAM:  GENERAL: NAD, well-developed  HEAD:  Atraumatic, Normocephalic  EYES: EOMI, PERRLA, conjunctiva and sclera clear  NECK: Supple, No JVD  CHEST/LUNG: Clear to auscultation bilaterally; No wheeze  HEART: Regular rate and rhythm; No murmurs, rubs, or gallops  ABDOMEN: Soft, Nontender, Nondistended; Bowel sounds present  EXTREMITIES:  2+ Peripheral Pulses, No clubbing, cyanosis, or edema  PSYCH: AAOx3  NEUROLOGY: non-focal  SKIN: No rashes or lesions    LABS:                        8.0    11.93 )-----------( 467      ( 24 May 2024 03:30 )             25.5     05-24    134<L>  |  94<L>  |  40<H>  ----------------------------<  151<H>  4.3   |  26  |  4.64<H>    Ca    7.6<L>      24 May 2024 03:30  Phos  3.3     05-24  Mg     2.40     05-24      PT/INR - ( 24 May 2024 03:30 )   PT: 14.2 sec;   INR: 1.27 ratio         PTT - ( 24 May 2024 16:30 )  PTT:118.8 sec      Urinalysis Basic - ( 24 May 2024 03:30 )    Color: x / Appearance: x / SG: x / pH: x  Gluc: 151 mg/dL / Ketone: x  / Bili: x / Urobili: x   Blood: x / Protein: x / Nitrite: x   Leuk Esterase: x / RBC: x / WBC x   Sq Epi: x / Non Sq Epi: x / Bacteria: x        RADIOLOGY & ADDITIONAL TESTS:    Imaging Personally Reviewed:    Consultant(s) Notes Reviewed:      Care Discussed with Consultants/Other Providers:

## 2024-05-24 NOTE — PROGRESS NOTE ADULT - ASSESSMENT
73M w/ PMHx hypertension, ESRD on HD via R radiocephalic fistula (MWF), DM, HTN, p/w hiccups and peaked T waves, admitted to MICU for c/f baclofen toxicity. Patient received 1x HD on admission. R femoral shiley removed 5/2, had 2 RRT for AMS and failed HD via AVF 5/3. S/p emergent R IJ shiley placement 5/3, started CRRT which is now transitioned back to iHD. Vascular planning RUE fistulogram when medically optimized. Extubated to HFNC then reintubated 5/12 for c/f aspiration w/ hypoxic resp failure. S/p trach 5/14. Downgraded from MICU to RCU 5/16.    Recommendations:   - Planning for fistulogram today  - hold heparin gtt on call to the OR  - hep gtt for nonocclusive R common iliac DVT  - Consent signed by wife, in chart  - Med/cards/ID appreciate documented risk stratification  - Continue HD via replaced R IJ shiley  - Global care per primary     Vascular Surgery  [id number]

## 2024-05-24 NOTE — PROGRESS NOTE ADULT - ASSESSMENT
74 y/o M PMhx HTN, ESRD (on HD M/W/F), DM who presented with hiccups x 2 days and chest pain found to have peaked T waves. Hospital course c/b AMS s/p RRT on 5/1 and 5/2. Vascular consulted 2/2 RUE AVF access unable to be used s/p R femoral shiley placed for emergent HD. Transferred to MICU and intubated 5/2 for airway protection.   received empiric course of zosyn x 5 days, completed 5/7 and meropenem x 5 days, completed 5/14    PNA, fever  febrile 5/18  CXR- Hazy opacity in the right lower lung field    B. cereus bacteremia  blood cx 5/10 w/ Bacillus cereus in 1/4 bottles  repeat blood cultures 5/11- NGTD   s/p vanc, completed 5/20    AMS, CVA  TTE- no evidence of valvular disease  s/p LP 5/2, viral encephalitis/ meningitis ruled out  MRI- Punctate foci of restricted diffusion in the left talya and left cerebellum with associated T2 prolongation consistent with acute infarcts  s/p trach 5/14, s/p PEG 5/17    DVT  CT- Nonocclusive R common iliac vein deep venous thrombosis.    Recommendations  s/p zosyn, completed 5/23  monitor off antibiotics    Steven Torres M.D.  [facility], Division of Infectious Diseases  [phone]  After 5pm on weekdays and all day on weekends - please call [phone]

## 2024-05-24 NOTE — CHART NOTE - NSCHARTNOTEFT_GEN_A_CORE
RCU PA NOTE     Procedure: Tracheostomy suture removal     Seen by bedside and tracheostomy clean, dry and intact, and 2/4 sutures present. Chart reviewed and given POD 10 decision made to remove remaining sutures per protocol. Patient placed supine and pillow removed from behind head for optimal position. Sutures clipped and accounted for. All sharps placed in sharps container procedure. Pillow replaced and patient's HOB increased back to 30-45 degrees. RN notified.     JANET Mercedes, PA-C  Department of Medicine/ RCU  In house RCU [facility] [id number]  In house Medicine [facility] [id number]  Reachable via teams

## 2024-05-24 NOTE — PROGRESS NOTE ADULT - ASSESSMENT
73-year-old male past medical history hypertension, ESRD on dialysis Monday Wednesday Friday, diabetes insulin-dependent presents with hiccups patient endorses persistent hiccups for the last 2 days. Nephrology consulted for HD needs.    A/P  ESRD:  Center: [facility]  Nephrologist: Dr. Hardwick  Access: R AVF  MWF schedule  Vascular for fistulogram   s/p femoral shiley on 5/1, now removed  s/p placement of IJ shiley 5/3  Stopped CRRT   Restarted IHD  s/p HD 5/7 UF 1778; treatment terminated early due to hypotension   S/P MRA head/neck w/ gadolinium --> Received HD on 5/9 and 5/10.  5/10 Will need to dialyze 3 days consecutively--> plan for HD on 5/11 5/11 shiley removed due to bacteremia; did not receive HD.  Line holiday  5/12 - BUN worsened; K up trending.    5/13 Shiley placed.  5/17: s/p PEG placement.   5/21 IR consulted for shiley exchange; IR recommended to keep current shiley in place, with pending fistulogram   Planned for fistulogram today.  HD today after fistulogram.  Continue HD MWF   Consent obtained and placed in ED chart  Renal diet  Monitor BMP  [place] where his outpatient Nephro-Dr. Hardwick can follow him    Hyperkalemia/Hypokalemia  Improved w/ HD.  C/W HD schedule as above.  Lokelma 10gm PRN for K >5.3 on non-HD days  PhosNaK given 5/21 and again 5/23.  K better  Low K diet.  Monitor closely     HTN:  BP fluctuating; suboptimal.  On carvedilol 12.5mg BID, hydralazine 100mg TID.  Currently of nifedipine --> consider restarting nifedipine @ 30mg if BP remains suboptimal.  UF w/ HD as BP permits.  Monitor BP.    Anemia:  Hgb low.  + iron deficiency.  Tsat 13% - on ferrous sulfate 300mg qd via PEG.  Will hold venofer for now in view of up trending leukocytosis.  SILVA w/ HD.  Tranfuse for Hgb <7.  Monitor Hb.    CKD-MBD  Check PTH  PO4 at goal.  Sevelamer 1600mg TID d/c'ed 5/21.  S/p PhosNaK x2 doses 5/23.  Monitor Ca, PO4 daily    Hypocalcemia:  In setting of CKD vs. hypoalbuminemia.  Optimize albumin.  Corrected Ca WNL.  Replete as needed.  Monitor Ca.

## 2024-05-24 NOTE — PROGRESS NOTE ADULT - NS ATTEND AMEND GEN_ALL_CORE FT
Patient is a 74 yo M w/ ESRD on dialysis, HTN and T2DM who was admitted with demand ischemia versus NSTEMI.  Hospital course was complicated by baclofen toxicity requiring intubation and MICU transfer, left talya and cerebellum stroke, aspiration and B cereus bacteremia and RLE DVT. Now s/p Trach 5/16    #Respiratory failure  #Trach dependence  #DVT  #ESRD  - c/w heparin gtt, will hold on call to OR as per Vascular  - c/w mechanical ventilatory support, tolerating PSV 10/5 today  - Completed course of Vanc for B cereus bacteremia  - Complete empiric course of Zosyn x 7 days for recent fever  - Awaiting fistulogram per vascular, planned for today. Patient is medically optimized for procedure.  - HD as per Renal, HD today  - Plan for possible Permacath pending fistulogram    Kody Rivas MD  Pulmonary & Critical Care

## 2024-05-24 NOTE — PROGRESS NOTE ADULT - SUBJECTIVE AND OBJECTIVE BOX
[facility] [place] [facility]   MD ELIANE BHAGAT MD ANGELA WONG, PA QIAN CHEN, [place]    TEL:  OFFICE: [phone]  From 5pm-7am Answering Service [phone]    -- RENAL FOLLOW UP NOTE ---Date of Service 05-24-24 @ 14:51    Patient is a 73y old  Male who presents with a chief complaint of Unstable angina, abn EKG.    Patient seen and examined at bedside. Trached to vent; no respiratory distress.    VITALS:  T(F): 98.7 (05-24-24 @ 08:00), Max: 99 (05-23-24 @ 16:12)  HR: 60 (05-24-24 @ 10:51)  BP: 161/58 (05-24-24 @ 08:00)  RR: 18 (05-24-24 @ 08:00)  SpO2: 100% (05-24-24 @ 10:51)  Wt(kg): --    05-23 @ 07:01  -  05-24 @ 07:00  --------------------------------------------------------  IN: 897 mL / OUT: 0 mL / NET: 897 mL      PHYSICAL EXAM:  General: NAD  Neck: No JVD  Respiratory: + rhonchi  Cardiovascular: S1, S2, RRR  Gastrointestinal: BS+, soft, NT/ND  Extremities: + swelling of b/l UE.    Hospital Medications:   MEDICATIONS  (STANDING):  albuterol/ipratropium for Nebulization 3 milliLiter(s) Nebulizer every 12 hours  aspirin  chewable 81 milliGRAM(s) Oral daily  atorvastatin 20 milliGRAM(s) Oral at bedtime  carvedilol 12.5 milliGRAM(s) Oral every 12 hours  chlorhexidine 0.12% Liquid 15 milliLiter(s) Oral Mucosa every 12 hours  chlorhexidine 2% Cloths 1 Application(s) Topical <User Schedule>  dextrose 10% Bolus 125 milliLiter(s) IV Bolus once  dextrose 5%. 1000 milliLiter(s) (50 mL/Hr) IV Continuous <Continuous>  dextrose 5%. 1000 milliLiter(s) (100 mL/Hr) IV Continuous <Continuous>  dextrose 50% Injectable 12.5 Gram(s) IV Push once  dextrose 50% Injectable 25 Gram(s) IV Push once  epoetin reilly (EPOGEN) Injectable 76012 Unit(s) IV Push <User Schedule>  ferrous    sulfate Liquid 300 milliGRAM(s) Enteral Tube daily  glucagon  Injectable 1 milliGRAM(s) IntraMuscular once  heparin  Infusion. 1100 Unit(s)/Hr (11 mL/Hr) IV Continuous <Continuous>  hydrALAZINE 100 milliGRAM(s) Oral three times a day  insulin lispro (ADMELOG) corrective regimen sliding scale   SubCutaneous every 6 hours  insulin NPH human recombinant 4 Unit(s) SubCutaneous every 6 hours  piperacillin/tazobactam IVPB.. 3.375 Gram(s) IV Intermittent every 12 hours  sodium bicarbonate 650 milliGRAM(s) Oral every 8 hours  sodium chloride 3%  Inhalation 4 milliLiter(s) Inhalation every 12 hours      LABS:  05-24    134<L>  |  94<L>  |  40<H>  ----------------------------<  151<H>  4.3   |  26  |  4.64<H>    Ca    7.6<L>      24 May 2024 03:30  Phos  3.3     05-24  Mg     2.40     05-24      Creatinine Trend: 4.64 <--, 3.29 <--, 4.22 <--, 3.58 <--, 6.25 <--, 5.08 <--, 3.64 <--    Phosphorus: 3.3 mg/dL (05-24 @ 03:30)                          8.0    11.93 )-----------( 467      ( 24 May 2024 03:30 )             25.5     Urine Studies:  Urinalysis - [05-24-24 @ 03:30]      Color  / Appearance  / SG  / pH       Gluc 151 / Ketone   / Bili  / Urobili        Blood  / Protein  / Leuk Est  / Nitrite       RBC  / WBC  / Hyaline  / Gran  / Sq Epi  / Non Sq Epi  / Bacteria       Iron 16, TIBC 121, %sat 13      [05-17-24 @ 06:00]  Ferritin 720      [05-17-24 @ 06:00]  TSH 2.60      [05-17-24 @ 06:00]  Lipid: chol 86, , HDL 27, LDL --      [05-17-24 @ 06:00]    HBsAb <3.0      [05-01-24 @ 14:42]  HBcAb Nonreact      [05-01-24 @ 14:42]

## 2024-05-24 NOTE — PROGRESS NOTE ADULT - SUBJECTIVE AND OBJECTIVE BOX
[facility], Division of Infectious Diseases  AUGUST Carbajal Y. Patel, S. Shah, G. Brian  [phone]  ([phone] - weekdays after 5pm and weekends)    Name: JIMMY BLAND  Age/Gender: 73y Male  MRN: [id number]    Interval History:  Notes reviewed.   No concerning overnight events.  Afebrile.     Allergies: No Known Allergies      Objective:  Vitals:   T(F): 98.7 (05-24-24 @ 08:00), Max: 99 (05-23-24 @ 16:12)  HR: 60 (05-24-24 @ 10:51) (57 - 90)  BP: 161/58 (05-24-24 @ 08:00) (150/55 - 185/58)  RR: 18 (05-24-24 @ 08:00) (18 - 22)  SpO2: 100% (05-24-24 @ 10:51) (100% - 100%)  Physical Examination:  General: no acute distress  HEENT: anicteric, tracheostomy  Lungs: clear to auscultation anteriorly  Heart: S1, S2, normal rate, RIJ shiley  Abdomen: Soft. ND. NT  Extremities: No LE edema.   Skin: Warm. Dry.     Laboratory Studies:  CBC:                       8.0    11.93 )-----------( 467      ( 24 May 2024 03:30 )             25.5     WBC Trend:  11.93 05-24-24 @ 03:30  11.06 05-23-24 @ 00:15  11.06 05-22-24 @ 07:25  9.71 05-22-24 @ 05:30  10.27 05-21-24 @ 05:25  9.06 05-20-24 @ 06:31  10.08 05-19-24 @ 05:50  7.35 05-18-24 @ 03:45  7.71 05-17-24 @ 22:30    CMP: 05-24    134<L>  |  94<L>  |  40<H>  ----------------------------<  151<H>  4.3   |  26  |  4.64<H>    Ca    7.6<L>      24 May 2024 03:30  Phos  3.3     05-24  Mg     2.40     05-24          Vancomycin Level, Random: 21.8 ug/mL (05-21-24 @ 05:25)  Vancomycin Level, Random: 17.1 ug/mL (05-20-24 @ 06:31)    Urinalysis Basic - ( 24 May 2024 03:30 )    Color: x / Appearance: x / SG: x / pH: x  Gluc: 151 mg/dL / Ketone: x  / Bili: x / Urobili: x   Blood: x / Protein: x / Nitrite: x   Leuk Esterase: x / RBC: x / WBC x   Sq Epi: x / Non Sq Epi: x / Bacteria: x      Microbiology: reviewed     Culture - Sputum (collected 05-18-24 @ 04:30)  Source: .Sputum Sputum  Gram Stain (05-18-24 @ 21:05):    Few polymorphonuclear leukocytes per low power field    No Squamous epithelial cells per low power field    No organisms seen per oil power field  Final Report (05-20-24 @ 07:02):    Normal Respiratory Jocelyn present    Culture - Blood (collected 05-18-24 @ 04:05)  Source: .Blood Blood-Venous  Final Report (05-23-24 @ 11:00):    No growth at 5 days    Culture - Blood (collected 05-18-24 @ 03:45)  Source: .Blood Blood-Venous  Final Report (05-23-24 @ 09:00):    No growth at 5 days    Culture - Fungal, Bronchial (collected 05-12-24 @ 15:06)  Source: .Bronchial Bronchial Lavage  Preliminary Report (05-22-24 @ 23:02):    No fungus isolated at 1 week.    Culture - Bronchial (collected 05-12-24 @ 15:06)  Source: .Bronchial Bronchial Lavage  Gram Stain (05-12-24 @ 22:40):    Moderate polymorphonuclear leukocytes per low power field    Rare Squamous epithelial cells per low power field    Rare Gram Positive Cocci in Clusters per oil power field  Final Report (05-14-24 @ 08:10):    Normal Respiratory Jocelyn present    Culture - Acid Fast - Bronchial w/Smear (collected 05-12-24 @ 15:06)  Source: .Bronchial Bronchial Lavage  Preliminary Report (05-22-24 @ 23:08):    No acid-fast bacilli isolated at 1 week. ****Acid-fast cultures are held    for 6 weeks.****    Culture - Blood (collected 05-11-24 @ 14:00)  Source: .Blood Blood-Peripheral  Final Report (05-16-24 @ 19:01):    No growth at 5 days    Culture - Blood (collected 05-11-24 @ 14:00)  Source: .Blood Blood-Peripheral  Final Report (05-16-24 @ 19:01):    No growth at 5 days    Culture - Sputum (collected 05-10-24 @ 16:45)  Source: .Sputum Sputum  Gram Stain (05-10-24 @ 22:57):    Rare polymorphonuclear leukocytes per low power field    Rare Squamous epithelial cells per low power field    Rare Gram Negative Rods per oil power field  Final Report (05-12-24 @ 16:49):    Normal Respiratory Jocelyn present        Radiology: reviewed     Medications:  acetaminophen   Oral Liquid .. 650 milliGRAM(s) Oral every 6 hours PRN  albuterol/ipratropium for Nebulization 3 milliLiter(s) Nebulizer every 12 hours  aspirin  chewable 81 milliGRAM(s) Oral daily  atorvastatin 20 milliGRAM(s) Oral at bedtime  carvedilol 12.5 milliGRAM(s) Oral every 12 hours  chlorhexidine 0.12% Liquid 15 milliLiter(s) Oral Mucosa every 12 hours  chlorhexidine 2% Cloths 1 Application(s) Topical <User Schedule>  dextrose 10% Bolus 125 milliLiter(s) IV Bolus once  dextrose 5%. 1000 milliLiter(s) IV Continuous <Continuous>  dextrose 5%. 1000 milliLiter(s) IV Continuous <Continuous>  dextrose 50% Injectable 12.5 Gram(s) IV Push once  dextrose 50% Injectable 25 Gram(s) IV Push once  dextrose Oral Gel 15 Gram(s) Oral once PRN  epoetin reilly (EPOGEN) Injectable 92857 Unit(s) IV Push <User Schedule>  ferrous    sulfate Liquid 300 milliGRAM(s) Enteral Tube daily  glucagon  Injectable 1 milliGRAM(s) IntraMuscular once  heparin  Infusion. 1100 Unit(s)/Hr IV Continuous <Continuous>  hydrALAZINE 100 milliGRAM(s) Oral three times a day  insulin lispro (ADMELOG) corrective regimen sliding scale   SubCutaneous every 6 hours  insulin NPH human recombinant 4 Unit(s) SubCutaneous every 6 hours  melatonin 3 milliGRAM(s) Oral at bedtime PRN  piperacillin/tazobactam IVPB.. 3.375 Gram(s) IV Intermittent every 12 hours  sodium bicarbonate 650 milliGRAM(s) Oral every 8 hours  sodium chloride 0.9% lock flush 10 milliLiter(s) IV Push every 1 hour PRN  sodium chloride 3%  Inhalation 4 milliLiter(s) Inhalation every 12 hours    Antimicrobials:  piperacillin/tazobactam IVPB.. 3.375 Gram(s) IV Intermittent every 12 hours

## 2024-05-24 NOTE — PROGRESS NOTE ADULT - SUBJECTIVE AND OBJECTIVE BOX
Cardiovascular Disease Progress Note  DATE OF SERVICE: 05-24-24 @ 08:19    Overnight events: No acute events overnight.    The patient is in no distress on mechanical ventilation via trach.   Otherwise review of systems negative    Objective Findings:  T(C): 37 (05-24-24 @ 04:00), Max: 37.2 (05-23-24 @ 11:00)  HR: 58 (05-24-24 @ 07:47) (58 - 90)  BP: 174/66 (05-24-24 @ 04:00) (150/55 - 185/58)  RR: 19 (05-24-24 @ 04:00) (16 - 22)  SpO2: 100% (05-24-24 @ 07:47) (99% - 100%)  Wt(kg): --  Daily     Daily       Physical Exam:  Gen: NAD; Patient resting comfortably  HEENT: EOMI, Normocephalic/ atraumatic  CV: RRR, normal S1 + S2, no m/r/g  Lungs:  Normal respiratory effort; fair air entry to auscultation bilaterally  Abd: soft, non-tender; bowel sounds present  Ext: No edema; warm and well perfused    Telemetry: n/a    Laboratory Data:                        8.0    11.93 )-----------( 467      ( 24 May 2024 03:30 )             25.5     05-24    134<L>  |  94<L>  |  40<H>  ----------------------------<  151<H>  4.3   |  26  |  4.64<H>    Ca    7.6<L>      24 May 2024 03:30  Phos  3.3     05-24  Mg     2.40     05-24      PT/INR - ( 24 May 2024 03:30 )   PT: 14.2 sec;   INR: 1.27 ratio         PTT - ( 24 May 2024 03:30 )  PTT:121.0 sec          Inpatient Medications:  MEDICATIONS  (STANDING):  albuterol/ipratropium for Nebulization 3 milliLiter(s) Nebulizer every 12 hours  aspirin  chewable 81 milliGRAM(s) Oral daily  atorvastatin 20 milliGRAM(s) Oral at bedtime  carvedilol 12.5 milliGRAM(s) Oral every 12 hours  chlorhexidine 0.12% Liquid 15 milliLiter(s) Oral Mucosa every 12 hours  chlorhexidine 2% Cloths 1 Application(s) Topical <User Schedule>  dextrose 10% Bolus 125 milliLiter(s) IV Bolus once  dextrose 5%. 1000 milliLiter(s) (50 mL/Hr) IV Continuous <Continuous>  dextrose 5%. 1000 milliLiter(s) (100 mL/Hr) IV Continuous <Continuous>  dextrose 50% Injectable 25 Gram(s) IV Push once  dextrose 50% Injectable 12.5 Gram(s) IV Push once  epoetin reilly (EPOGEN) Injectable 00938 Unit(s) IV Push <User Schedule>  ferrous    sulfate Liquid 300 milliGRAM(s) Enteral Tube daily  glucagon  Injectable 1 milliGRAM(s) IntraMuscular once  heparin  Infusion. 1100 Unit(s)/Hr (11 mL/Hr) IV Continuous <Continuous>  hydrALAZINE 100 milliGRAM(s) Oral three times a day  insulin lispro (ADMELOG) corrective regimen sliding scale   SubCutaneous every 6 hours  insulin NPH human recombinant 4 Unit(s) SubCutaneous every 6 hours  piperacillin/tazobactam IVPB.. 3.375 Gram(s) IV Intermittent every 12 hours  sodium bicarbonate 650 milliGRAM(s) Oral every 8 hours  sodium chloride 3%  Inhalation 4 milliLiter(s) Inhalation every 12 hours      Assessment: 73 year old man with HTN, HLD, T2DM on insulin, and ESRD on HD presents with supply demand ischemia and angina.    Plan of Care:    #Type II myocardial infarction-  Secondary to distributive shock earlier on admission.   The repeat echo shows no new wall motion abnormality.   I would not pursue cath at this time given recurrent aspiration and chronic respiratory failure s/p trach 5/14.   The patient is optimized from a cardiac standpoint to proceed with fistulogram.        #Sinus bradycardia-  No evidence of AV block on admission EKG or telemetry.  No indication for pacing at this time.   - Continue Coreg with holding parameters.     #HTN-  BP labile.  Continue current regimen  Monitor trends.     #ESRD-  HD as per renal     #DVT   - R common Iliac DVT  - Hep GTT  - AC management as per vascular team.               Over 55 minutes spent on total encounter; more than 50% of the visit was spent counseling and/or coordinating care by the attending physician.      Geovani Bravo MD [facility]  Cardiovascular Disease  [phone]

## 2024-05-24 NOTE — PROGRESS NOTE ADULT - SUBJECTIVE AND OBJECTIVE BOX
Patient is a 73y Male     Patient is a 73y old  Male who presents with a chief complaint of Unstable angina, abn EKG (23 May 2024 21:44)      HPI:  73-year-old male past medical history hypertension, ESRD on dialysis Monday Wednesday Friday, diabetes insulin-dependent presents with hiccups patient endorses persistent hiccups for the last 2 days. found to have peaked T waves, a new finding. denies dizziness, N/V, denies CP, palps, abd pain (29 Apr 2024 21:15)      PAST MEDICAL & SURGICAL HISTORY:  Diabetes      Benign essential HTN      HLD (hyperlipidemia)      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      ESRD on hemodialysis      Arteriovenous fistula          MEDICATIONS  (STANDING):  albuterol/ipratropium for Nebulization 3 milliLiter(s) Nebulizer every 12 hours  aspirin  chewable 81 milliGRAM(s) Oral daily  atorvastatin 20 milliGRAM(s) Oral at bedtime  carvedilol 12.5 milliGRAM(s) Oral every 12 hours  chlorhexidine 0.12% Liquid 15 milliLiter(s) Oral Mucosa every 12 hours  chlorhexidine 2% Cloths 1 Application(s) Topical <User Schedule>  dextrose 10% Bolus 125 milliLiter(s) IV Bolus once  dextrose 5%. 1000 milliLiter(s) (50 mL/Hr) IV Continuous <Continuous>  dextrose 5%. 1000 milliLiter(s) (100 mL/Hr) IV Continuous <Continuous>  dextrose 50% Injectable 12.5 Gram(s) IV Push once  dextrose 50% Injectable 25 Gram(s) IV Push once  epoetin reilly (EPOGEN) Injectable 71769 Unit(s) IV Push <User Schedule>  ferrous    sulfate Liquid 300 milliGRAM(s) Enteral Tube daily  glucagon  Injectable 1 milliGRAM(s) IntraMuscular once  heparin  Infusion. 1100 Unit(s)/Hr (11 mL/Hr) IV Continuous <Continuous>  hydrALAZINE 100 milliGRAM(s) Oral three times a day  insulin lispro (ADMELOG) corrective regimen sliding scale   SubCutaneous every 6 hours  insulin NPH human recombinant 4 Unit(s) SubCutaneous every 6 hours  piperacillin/tazobactam IVPB.. 3.375 Gram(s) IV Intermittent every 12 hours  sodium bicarbonate 650 milliGRAM(s) Oral every 8 hours  sodium chloride 3%  Inhalation 4 milliLiter(s) Inhalation every 12 hours      Allergies    No Known Allergies    Intolerances        SOCIAL HISTORY:  Denies ETOh,Smoking,     FAMILY HISTORY:  FHx: diabetes mellitus (Father, Aunt)        REVIEW OF SYSTEMS:    CONSTITUTIONAL: No weakness, fevers or chills  EYES/ENT: No visual changes;  No vertigo or throat pain   NECK: No pain or stiffness  RESPIRATORY: No cough, wheezing, hemoptysis; No shortness of breath  CARDIOVASCULAR: No chest pain or palpitations  GASTROINTESTINAL: No abdominal or epigastric pain. No nausea, vomiting, or hematemesis; No diarrhea or constipation. No melena or hematochezia.  GENITOURINARY: No dysuria, frequency or hematuria  NEUROLOGICAL: No numbness or weakness  SKIN: No itching, burning, rashes, or lesions   All other review of systems is negative unless indicated above.    VITAL:  T(C): , Max: 37.2 (05-23-24 @ 11:00)  T(F): , Max: 99 (05-23-24 @ 16:12)  HR: 65 (05-24-24 @ 04:00)  BP: 174/66 (05-24-24 @ 04:00)  BP(mean): --  RR: 19 (05-24-24 @ 04:00)  SpO2: 100% (05-24-24 @ 04:00)  Wt(kg): --    I and O's:    05-21 @ 07:01  -  05-22 @ 07:00  --------------------------------------------------------  IN: 672 mL / OUT: 0 mL / NET: 672 mL    05-22 @ 07:01  -  05-23 @ 07:00  --------------------------------------------------------  IN: 1397 mL / OUT: 2200 mL / NET: -803 mL    05-23 @ 07:01  -  05-24 @ 06:53  --------------------------------------------------------  IN: 897 mL / OUT: 0 mL / NET: 897 mL          PHYSICAL EXAM:    Constitutional: NAD  HEENT: PERRLA,   Neck: No JVD  Respiratory: CTA B/L  Cardiovascular: S1 and S2  Gastrointestinal: BS+, soft, NT/ND  Extremities: No peripheral edema  Neurological: A/O x 3, no focal deficits  Psychiatric: Normal mood, normal affect  : No Abbasi  Skin: No rashes  Access: Not applicable  Back: No CVA tenderness    LABS:                        8.0    11.93 )-----------( 467      ( 24 May 2024 03:30 )             25.5     05-24    134<L>  |  94<L>  |  40<H>  ----------------------------<  151<H>  4.3   |  26  |  4.64<H>    Ca    7.6<L>      24 May 2024 03:30  Phos  3.3     05-24  Mg     2.40     05-24            RADIOLOGY & ADDITIONAL STUDIES:

## 2024-05-24 NOTE — PROGRESS NOTE ADULT - SUBJECTIVE AND OBJECTIVE BOX
CHIEF COMPLAINT: Patient is a 73y old  Male who presents with a chief complaint of Unstable angina, abn EKG (24 May 2024 08:19)      INTERVAL EVENTS:    REVIEW OF SYSTEMS: Seen by bedside during AM rounds and     Mode: AC/ CMV (Assist Control/ Continuous Mandatory Ventilation), RR (machine): 12, TV (machine): 500, FiO2: 30, PEEP: 5, ITime: 0.67, MAP: 9, PIP: 32      OBJECTIVE:  ICU Vital Signs Last 24 Hrs  T(C): 37 (24 May 2024 04:00), Max: 37.2 (23 May 2024 11:00)  T(F): 98.6 (24 May 2024 04:00), Max: 99 (23 May 2024 16:12)  HR: 58 (24 May 2024 07:47) (58 - 90)  BP: 174/66 (24 May 2024 04:00) (150/55 - 185/58)  BP(mean): --  ABP: --  ABP(mean): --  RR: 19 (24 May 2024 04:00) (16 - 22)  SpO2: 100% (24 May 2024 07:47) (99% - 100%)    O2 Parameters below as of 24 May 2024 04:00  Patient On (Oxygen Delivery Method): ventilator    O2 Concentration (%): 30      Mode: AC/ CMV (Assist Control/ Continuous Mandatory Ventilation), RR (machine): 12, TV (machine): 500, FiO2: 30, PEEP: 5, ITime: 0.67, MAP: 9, PIP: 32    05-23 @ 07:01  -  05-24 @ 07:00  --------------------------------------------------------  IN: 897 mL / OUT: 0 mL / NET: 897 mL      CAPILLARY BLOOD GLUCOSE      POCT Blood Glucose.: 147 mg/dL (24 May 2024 05:43)      HOSPITAL MEDICATIONS:  MEDICATIONS  (STANDING):  albuterol/ipratropium for Nebulization 3 milliLiter(s) Nebulizer every 12 hours  aspirin  chewable 81 milliGRAM(s) Oral daily  atorvastatin 20 milliGRAM(s) Oral at bedtime  carvedilol 12.5 milliGRAM(s) Oral every 12 hours  chlorhexidine 0.12% Liquid 15 milliLiter(s) Oral Mucosa every 12 hours  chlorhexidine 2% Cloths 1 Application(s) Topical <User Schedule>  dextrose 10% Bolus 125 milliLiter(s) IV Bolus once  dextrose 5%. 1000 milliLiter(s) (50 mL/Hr) IV Continuous <Continuous>  dextrose 5%. 1000 milliLiter(s) (100 mL/Hr) IV Continuous <Continuous>  dextrose 50% Injectable 12.5 Gram(s) IV Push once  dextrose 50% Injectable 25 Gram(s) IV Push once  epoetin reilly (EPOGEN) Injectable 90754 Unit(s) IV Push <User Schedule>  ferrous    sulfate Liquid 300 milliGRAM(s) Enteral Tube daily  glucagon  Injectable 1 milliGRAM(s) IntraMuscular once  heparin  Infusion. 1100 Unit(s)/Hr (11 mL/Hr) IV Continuous <Continuous>  hydrALAZINE 100 milliGRAM(s) Oral three times a day  insulin lispro (ADMELOG) corrective regimen sliding scale   SubCutaneous every 6 hours  insulin NPH human recombinant 4 Unit(s) SubCutaneous every 6 hours  sodium bicarbonate 650 milliGRAM(s) Oral every 8 hours  sodium chloride 3%  Inhalation 4 milliLiter(s) Inhalation every 12 hours    MEDICATIONS  (PRN):  acetaminophen   Oral Liquid .. 650 milliGRAM(s) Oral every 6 hours PRN Temp greater or equal to 38C (100.4F), Moderate Pain (4 - 6)  dextrose Oral Gel 15 Gram(s) Oral once PRN Blood Glucose LESS THAN 70 milliGRAM(s)/deciliter  melatonin 3 milliGRAM(s) Oral at bedtime PRN Insomnia  sodium chloride 0.9% lock flush 10 milliLiter(s) IV Push every 1 hour PRN Pre/post blood products, medications, blood draw, and to maintain line patency      PHYSICAL EXAMINATION    LABS:                        8.0    11.93 )-----------( 467      ( 24 May 2024 03:30 )             25.5     05-24    134<L>  |  94<L>  |  40<H>  ----------------------------<  151<H>  4.3   |  26  |  4.64<H>    Ca    7.6<L>      24 May 2024 03:30  Phos  3.3     05-24  Mg     2.40     05-24      PT/INR - ( 24 May 2024 03:30 )   PT: 14.2 sec;   INR: 1.27 ratio         PTT - ( 24 May 2024 03:30 )  PTT:121.0 sec  Urinalysis Basic - ( 24 May 2024 03:30 )    Color: x / Appearance: x / SG: x / pH: x  Gluc: 151 mg/dL / Ketone: x  / Bili: x / Urobili: x   Blood: x / Protein: x / Nitrite: x   Leuk Esterase: x / RBC: x / WBC x   Sq Epi: x / Non Sq Epi: x / Bacteria: x            PAST MEDICAL & SURGICAL HISTORY:  Diabetes      Benign essential HTN      HLD (hyperlipidemia)      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 CHIEF COMPLAINT: Patient is a 73y old  Male who presents with a chief complaint of Unstable angina, abn EKG (24 May 2024 08:19)      INTERVAL EVENTS:  - No interval events overnight   - Plan for fistulogram today     REVIEW OF SYSTEMS: Seen by bedside during AM rounds and denies SOB or pain    Mode: AC/ CMV (Assist Control/ Continuous Mandatory Ventilation), RR (machine): 12, TV (machine): 500, FiO2: 30, PEEP: 5, ITime: 0.67, MAP: 9, PIP: 32      OBJECTIVE:  ICU Vital Signs Last 24 Hrs  T(C): 37 (24 May 2024 04:00), Max: 37.2 (23 May 2024 11:00)  T(F): 98.6 (24 May 2024 04:00), Max: 99 (23 May 2024 16:12)  HR: 58 (24 May 2024 07:47) (58 - 90)  BP: 174/66 (24 May 2024 04:00) (150/55 - 185/58)  BP(mean): --  ABP: --  ABP(mean): --  RR: 19 (24 May 2024 04:00) (16 - 22)  SpO2: 100% (24 May 2024 07:47) (99% - 100%)    O2 Parameters below as of 24 May 2024 04:00  Patient On (Oxygen Delivery Method): ventilator    O2 Concentration (%): 30      Mode: AC/ CMV (Assist Control/ Continuous Mandatory Ventilation), RR (machine): 12, TV (machine): 500, FiO2: 30, PEEP: 5, ITime: 0.67, MAP: 9, PIP: 32    05-23 @ 07:01  -  05-24 @ 07:00  --------------------------------------------------------  IN: 897 mL / OUT: 0 mL / NET: 897 mL      CAPILLARY BLOOD GLUCOSE  POCT Blood Glucose.: 147 mg/dL (24 May 2024 05:43)      HOSPITAL MEDICATIONS:  MEDICATIONS  (STANDING):  albuterol/ipratropium for Nebulization 3 milliLiter(s) Nebulizer every 12 hours  aspirin  chewable 81 milliGRAM(s) Oral daily  atorvastatin 20 milliGRAM(s) Oral at bedtime  carvedilol 12.5 milliGRAM(s) Oral every 12 hours  chlorhexidine 0.12% Liquid 15 milliLiter(s) Oral Mucosa every 12 hours  chlorhexidine 2% Cloths 1 Application(s) Topical <User Schedule>  dextrose 10% Bolus 125 milliLiter(s) IV Bolus once  dextrose 5%. 1000 milliLiter(s) (50 mL/Hr) IV Continuous <Continuous>  dextrose 5%. 1000 milliLiter(s) (100 mL/Hr) IV Continuous <Continuous>  dextrose 50% Injectable 12.5 Gram(s) IV Push once  dextrose 50% Injectable 25 Gram(s) IV Push once  epoetin reilly (EPOGEN) Injectable 41975 Unit(s) IV Push <User Schedule>  ferrous    sulfate Liquid 300 milliGRAM(s) Enteral Tube daily  glucagon  Injectable 1 milliGRAM(s) IntraMuscular once  heparin  Infusion. 1100 Unit(s)/Hr (11 mL/Hr) IV Continuous <Continuous>  hydrALAZINE 100 milliGRAM(s) Oral three times a day  insulin lispro (ADMELOG) corrective regimen sliding scale   SubCutaneous every 6 hours  insulin NPH human recombinant 4 Unit(s) SubCutaneous every 6 hours  sodium bicarbonate 650 milliGRAM(s) Oral every 8 hours  sodium chloride 3%  Inhalation 4 milliLiter(s) Inhalation every 12 hours    MEDICATIONS  (PRN):  acetaminophen   Oral Liquid .. 650 milliGRAM(s) Oral every 6 hours PRN Temp greater or equal to 38C (100.4F), Moderate Pain (4 - 6)  dextrose Oral Gel 15 Gram(s) Oral once PRN Blood Glucose LESS THAN 70 milliGRAM(s)/deciliter  melatonin 3 milliGRAM(s) Oral at bedtime PRN Insomnia  sodium chloride 0.9% lock flush 10 milliLiter(s) IV Push every 1 hour PRN Pre/post blood products, medications, blood draw, and to maintain line patency      PHYSICAL EXAMINATION  General: NAD   HEENT: Trach present with mild bright red bloody oozing. Surgicell applied.   Cards: S1/S2, no murmurs   Pulm: Course vent sounds bilaterally. No wheezes.   Abdomen: Soft, nondistended and nontender. BS (+) PEG present.   Extremities: No pedal edema. THAI of BL upper and lower extremities, however limited by severe weakness   Neurology: Awake and able to follow commands and mouth simple words with no acute focal neurological deficits     LABS:                        8.0    11.93 )-----------( 467      ( 24 May 2024 03:30 )             25.5     05-24    134<L>  |  94<L>  |  40<H>  ----------------------------<  151<H>  4.3   |  26  |  4.64<H>    Ca    7.6<L>      24 May 2024 03:30  Phos  3.3     05-24  Mg     2.40     05-24      PT/INR - ( 24 May 2024 03:30 )   PT: 14.2 sec;   INR: 1.27 ratio         PTT - ( 24 May 2024 03:30 )  PTT:121.0 sec    Urinalysis Basic - ( 24 May 2024 03:30 )  Color: x / Appearance: x / SG: x / pH: x  Gluc: 151 mg/dL / Ketone: x  / Bili: x / Urobili: x   Blood: x / Protein: x / Nitrite: x   Leuk Esterase: x / RBC: x / WBC x   Sq Epi: x / Non Sq Epi: x / Bacteria: x            PAST MEDICAL & SURGICAL HISTORY:  Diabetes      Benign essential HTN      HLD (hyperlipidemia)      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

## 2024-05-24 NOTE — PROGRESS NOTE ADULT - SUBJECTIVE AND OBJECTIVE BOX
Neurology Progress Note    S: Patient seen and examined. Fistulogram planned for today     Medications: MEDICATIONS  (STANDING):  albuterol/ipratropium for Nebulization 3 milliLiter(s) Nebulizer every 12 hours  aspirin  chewable 81 milliGRAM(s) Oral daily  atorvastatin 20 milliGRAM(s) Oral at bedtime  carvedilol 12.5 milliGRAM(s) Oral every 12 hours  chlorhexidine 0.12% Liquid 15 milliLiter(s) Oral Mucosa every 12 hours  chlorhexidine 2% Cloths 1 Application(s) Topical <User Schedule>  dextrose 10% Bolus 125 milliLiter(s) IV Bolus once  dextrose 5%. 1000 milliLiter(s) (50 mL/Hr) IV Continuous <Continuous>  dextrose 5%. 1000 milliLiter(s) (100 mL/Hr) IV Continuous <Continuous>  dextrose 50% Injectable 25 Gram(s) IV Push once  dextrose 50% Injectable 12.5 Gram(s) IV Push once  epoetin reilly (EPOGEN) Injectable 82421 Unit(s) IV Push <User Schedule>  ferrous    sulfate Liquid 300 milliGRAM(s) Enteral Tube daily  glucagon  Injectable 1 milliGRAM(s) IntraMuscular once  heparin  Infusion. 1100 Unit(s)/Hr (11 mL/Hr) IV Continuous <Continuous>  hydrALAZINE 100 milliGRAM(s) Oral three times a day  insulin lispro (ADMELOG) corrective regimen sliding scale   SubCutaneous every 6 hours  insulin NPH human recombinant 4 Unit(s) SubCutaneous every 6 hours  sodium bicarbonate 650 milliGRAM(s) Oral every 8 hours  sodium chloride 3%  Inhalation 4 milliLiter(s) Inhalation every 12 hours    MEDICATIONS  (PRN):  acetaminophen   Oral Liquid .. 650 milliGRAM(s) Oral every 6 hours PRN Temp greater or equal to 38C (100.4F), Moderate Pain (4 - 6)  dextrose Oral Gel 15 Gram(s) Oral once PRN Blood Glucose LESS THAN 70 milliGRAM(s)/deciliter  melatonin 3 milliGRAM(s) Oral at bedtime PRN Insomnia  sodium chloride 0.9% lock flush 10 milliLiter(s) IV Push every 1 hour PRN Pre/post blood products, medications, blood draw, and to maintain line patency       Vitals:  Vital Signs Last 24 Hrs  T(C): 37.1 (24 May 2024 08:00), Max: 37.2 (23 May 2024 11:00)  T(F): 98.7 (24 May 2024 08:00), Max: 99 (23 May 2024 16:12)  HR: 57 (24 May 2024 08:00) (57 - 90)  BP: 161/58 (24 May 2024 08:00) (150/55 - 185/58)  BP(mean): --  RR: 18 (24 May 2024 08:00) (16 - 22)  SpO2: 100% (24 May 2024 08:00) (99% - 100%)    Parameters below as of 24 May 2024 08:00  Patient On (Oxygen Delivery Method): ventilator    O2 Concentration (%): 30      General Exam:   General Appearance: + trach  Head: Normocephalic, atraumatic and no dysmorphic features  Ear, Nose, and Throat: Moist mucous membranes  CVS: S1S2+  Resp: mechanical  Abd: soft NTND  Extremities: No edema, no cyanosis  Skin: No bruises, no rashes     Neurological Exam:  Mental Status: Opens to voice, tracks, follows simple commands. Mouths words  Cranial Nerves: pupils 1-2mm, R facial palsy with smile  Motor: normal tone, RUE and RLE drift.  Sensation:  intact      I personally reviewed the below data/images/labs:    LABS:                          8.0    11.93 )-----------( 467      ( 24 May 2024 03:30 )             25.5     05-24    134<L>  |  94<L>  |  40<H>  ----------------------------<  151<H>  4.3   |  26  |  4.64<H>    Ca    7.6<L>      24 May 2024 03:30  Phos  3.3     05-24  Mg     2.40     05-24        PT/INR - ( 24 May 2024 03:30 )   PT: 14.2 sec;   INR: 1.27 ratio         PTT - ( 24 May 2024 03:30 )  PTT:121.0 sec  Urinalysis Basic - ( 24 May 2024 03:30 )    Color: x / Appearance: x / SG: x / pH: x  Gluc: 151 mg/dL / Ketone: x  / Bili: x / Urobili: x   Blood: x / Protein: x / Nitrite: x   Leuk Esterase: x / RBC: x / WBC x   Sq Epi: x / Non Sq Epi: x / Bacteria: x              CT Head No Cont:  (01 May 2024 18:11)  < from: CT Head No Cont (05.01.24 @ 18:11) >    ACC: [id number] EXAM:  CT BRAIN   ORDERED BY: SHELBY MOREL     PROCEDURE DATE:  05/01/2024          INTERPRETATION:  CT OF THE HEAD WITHOUT CONTRAST    CLINICAL INDICATION: Lethargy.    TECHNIQUE: Volumetric CT acquisition was performed through the brain and   reviewed using brain and bone window technique.      COMPARISON: CT head from 1/17/2024    FINDINGS:    The ventricular and sulcal size and configuration is age appropriate.     There is no acute loss of gray-white differentiation. Stable bilateral   inferior frontal encephalomalacia and gliosis likely related to remote   trauma.    There is no evidence of mass effect, midline shift, acute intracranial   hemorrhage, or extra-axial collections.     The calvarium is intact. The paranasalsinuses are clear.The mastoid air   cells are predominantly clear. The orbits are unremarkable.      IMPRESSION:  No acute intracranial hemorrhage or acute territorial infarction. Chronic   findings as above.    --- End of Report ---          GILDARDO KHAN; Resident Radiologist  This document has been electronically signed.  LADARIUS DENNY MD; Attending Radiologist  This document has been electronically signed. May  1 2024  7:54PM    < end of copied text >      EEG Classification / Summary:  Abnormal EEG in the awake, drowsy states.   -Generalized sharp waves with triphasic morphology, initially appearing as frequent GPDs at 1-1.5 Hz, decreasing in prevalence later in recording.  -Variable moderate to mild diffuse slowing.  -No electrographic seizures are captured.     Clinical Impression:  -Generalized sharp waves with triphasic morphology can be seen in toxic-metabolic encephalopathies and indicate some risk of generalized seizures, especially when at higher frequencies than in this study. These decrease in prevalence over the course of recording.  -Variable moderate to mild diffuse cerebral dysfunction is nonspecific in etiology.   -No seizures x2 days of recording.    m< from: MR Head w/wo IV Cont (05.06.24 @ 18:10) >    ACC: [id number] EXAM:  MR BRAIN WAW IC   ORDERED BY: DOLORES QUICK     PROCEDURE DATE:  05/06/2024          INTERPRETATION:  CLINICAL INDICATION: Altered mental status.    TECHNIQUE: Multi-planar, multi-sequence magnetic resonance imaging of the   brain was performed with and without intravenous contrast.    CONTRAST: Post-contrast MR images were obtained following infusion of 5   mL of Gadavist    COMPARISON: No prior studies are available for comparison    FINDINGS:    VENTRICLES AND SULCI:  Normal.  INTRA-AXIAL: Punctate foci of restricted diffusion in the left talya and   left cerebellum with associated T2 prolongation consistent with acute   infarcts. No acute intracranial hemorrhage. Encephalomalacia/gliosis   noted in the inferior frontal lobes. No abnormal enhancement. There are   patchy and confluent foci of hyperintense T2 signal within the   subcortical and periventricular white matter which are nonspecific but   likely related to chronic microvascular ischemic disease.  EXTRA-AXIAL:  No mass or collection.  VISUALIZED SINUSES: Mild polypoid mucosal thickening. Fluid noted in the   nasopharynx.  VISUALIZED MASTOIDS:  Clear.  CALVARIUM:  Normal.  CAROTID FLOW VOIDS: Normal.  MISCELLANEOUS:  None.    IMPRESSION: PUNCTATE FOCI OF RESTRICTED DIFFUSION IN THE LEFT TALYA AND   LEFT CEREBELLUM WITH ASSOCIATED T2 PROLONGATION CONSISTENT WITH ACUTE   INFARCTS. NO ACUTE INTRACRANIAL HEMORRHAGE. NO AREA OF ABNORMAL   ENHANCEMENT.    < end of copied text >    m< from: MR Angio Head No Cont (05.09.24 @ 15:58) >    ACC: [id number] EXAM:  MR ANGIO NECK WAW IC   ORDERED BY: Mozaik Media     ACC: [id number] EXAM:  MR ANGIO BRAIN   ORDERED BY: Mozaik Media     PROCEDURE DATE:  05/09/2024          INTERPRETATION:  .    CLINICAL INFORMATION: Stroke workup. Talya/cerebellar stroke.    TECHNIQUE: MRA images through the neck and [place] were obtained   using a combination of 2-D and 3-D time-of-flight acquisition. Post   contrast MR angiography of the neck was also performed. The data was then   reformatted intoa volumetric data set and reviewed as rotational MIP   images. 5 cc's of IV Gadavist was administered without immediate   complication. 2.5 cc's was discarded.    COMPARISON: Prior head CT exam dated 5/1/2024.    FINDINGS:    MRA Neck: There is a standard anatomic configuration to the aortic arch.    The origins of the great vessels appear unremarkable. The bilateral   common carotid arteries and carotid bifurcations appear unremarkable.    The bilateral cervical internal carotid arteries are within normal limits.    The origins of the bilateral vertebral arteries are normal. The bilateral   cervical vertebral arteries are normal in course and caliber.    MRA [place]: The bilateral intracranial internal carotid,   anterior, and middle cerebral arteries appear unremarkable.    The anterior and bilateral posterior communicating arteries are notable.    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 Otherwise, the   bilateral intradural vertebral arteries, vertebrobasilar junction,   basilar artery, and basilar tip appear unremarkable as well as the   bilateral posterior cerebral arteries.    IMPRESSION: No large vessel occlusion or stenosis.    < end of copied text >

## 2024-05-24 NOTE — PROGRESS NOTE ADULT - ASSESSMENT
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 Baclofen started and course complicated by AMS second to baclofen toxicity requiring intubation and MICU transfer. Course further complicated by LEFT talya and cerebellum stroke, R AVF stenosis, aspiration and B Cereus bacteremia and RLE DVT. s/p trach and transferred to RCU 5/16    NEUROLOGY  # AMS   - MRI HEAD (5/6) with punctate foci in the left talya and left cerebellum with concern for acute infarct.   - MRA HEAD and NECK (5/6) with no large vessel occlusion or stenosis.  - Continue on aspirin and hold DAPT for now pending procedures   - Continue on Lipitor for medical management   - Supportive care     CARDIOVASCULAR  # CP with NSTEMI vs demand ischemia with HTN   - Admitted for CP and HTN with peaked T WAVES and ECG with ST deviation on admission   - Troponins elevated on admission with negative CKMB   - TTE (4/30) with EF 72, normal LVRVSF, and no regional wall motion abnormalities   - Case discussed with cardiology and no acute need for cath. DAPT loaded on 5/4 and plan for medical management with ASA, lipitor and heparin GTT.     # Septic vs vasoplegic shock  # Hx of HTN   - Home BP medications held and pressors weaned off   - Continue on coreg 12.5 and hydralazine 100 TID   - Monitor BP with goal 150/90s    RESPIRATORY  # Respiratory failure   - AMS noted with baclofen toxicity requiring intubation   - Attempted extubation in MICU however course complicated by aspiration and prolonged course and now s/p tracheostomy with IP on 5/14  - Continue on vent 12/500/5/30 and able to tolerate SBT 10/5 x several hours but limited by weakness   - Continue on nebs and HTS Q12H for now     GI  # Dysphagia   - s/p surgical PEG 5/17   - Continue on tube feeds via PEG     RENAL  # ESRD on HD MWF with R BCF  # Fistula stenosis ?  - VA AVF (5/2) with Right radiocephalic arteriovenous fistula has no hemodynamically significant stenosis present at the anastomotic site ( cm/sec, EDV 49 cm/sec). The usable segment is partially thrombosed with minimal patency.  - Required R FEMORAL line on medicine, then removed on 5/2, and replaced with R IJ SHILEY on 5/3 for CRRT then converted back to iHD MWF   - R SHILEY removed on 5/10 second to bacteremia and replaced on 5/13   - Continue on HD MWF per HD   - Continue on Renvela 1600   - Continue on HCO3 650 tabs   - Initially the plan for Fistulogram was for 5/23, however cancelled by Vascular given emergent procedure.  - Will plan for tomorrow 5/24 - Will make NPO p MN, and pre op AM labs ordered     INFECTIOUS DISEASE  # Aspiration   - Recurrent fever spike 5/17 overnight and CXR with RLL opacity   - BCx and SCx negative, however fever spikes improved on ABX   - s/p treated with Zosyn empirically (5/18 - 5/23)   - Blood cx 5/18 NGTD    # B Cereus Bacteremia   - Course complicated by fevers and aspiration event   - MRSA (5/1) negative   - BCx (5/2) negative   - SCx (5/4) and BAL (5/12) negative   - BCx (5/10) with bacillus cereus and cleared on (5/12).   - Case discussed with ID and s/p Vancomycin through 5/21 x 10 day course.     HEME  # AOCD with ESRD   - Anemia panel with AOCD and low # sat   - Transfused on 5/16   - EPO 10K continued on TIW   - Ferrous supplement added   - Monitor HH     VASCULAR   # RLE DVT   - CT AP (5/12) with nonocclusive RIGHT common iliac vein deep venous thrombosis  - Continue on heparin GTT - will hold on call to OR tomorrow  - RLE precautions     ENDOCRINE  # IDDM2 A1C 6.9  - Continue on NPH 4U q 6 and ISS   - Monitor and adjust insulin based on FS     SKIN  - R FEMORAL (5/1-5/2)   - R IJ SHILEY (5/3-5/10)   - R IJ SHILEY (5/13 - )     ETHICS/ GOC    - FULL CODE     DISPO - Vent facility    74 YO M with PMHx of ESRD on HD MWF, HTN, and 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 Baclofen started and course complicated by AMS second to baclofen toxicity requiring intubation and MICU transfer. Course further complicated by LEFT talya and cerebellum stroke, R AVF stenosis, aspiration and B Cereus bacteremia and RLE DVT. s/p trach and transferred to RCU 5/16    NEUROLOGY  # AMS   - MRI HEAD (5/6) with punctate foci in the left tlaya and left cerebellum with concern for acute infarct.   - MRA HEAD and NECK (5/6) with no large vessel occlusion or stenosis.  - Continue on aspirin and hold DAPT for now pending procedures   - Continue on Lipitor for medical management   - Supportive care     CARDIOVASCULAR  # CP with NSTEMI vs demand ischemia with HTN   - Admitted for CP and HTN with peaked T WAVES and ECG with ST deviation on admission   - Troponins elevated on admission with negative CKMB   - TTE (4/30) with EF 72, normal LVRVSF, and no regional wall motion abnormalities   - Case discussed with cardiology and no acute need for cath. DAPT loaded on 5/4 and plan for medical management with ASA, lipitor and heparin GTT.     # Septic vs vasoplegic shock  # Hx of HTN   - Home BP medications held and pressors weaned off   - Continue on coreg 12.5 and hydralazine 100 TID   - Monitor BP with goal 150/90s    RESPIRATORY  # Respiratory failure   - AMS noted with baclofen toxicity requiring intubation   - Attempted extubation in MICU however course complicated by aspiration and prolonged course and now s/p tracheostomy with IP on 5/14  - Continue on vent 12/500/5/30 and able to tolerate SBT 10/5 x several hours but limited by weakness   - Continue on nebs and HTS Q12H for now     GI  # Dysphagia   - s/p surgical PEG 5/17   - Continue on tube feeds via PEG     RENAL  # ESRD on HD MWF with R BCF  # Fistula stenosis ?  - VA AVF (5/2) with Right radiocephalic arteriovenous fistula has no hemodynamically significant stenosis present at the anastomotic site ( cm/sec, EDV 49 cm/sec). The usable segment is partially thrombosed with minimal patency.  - Required R FEMORAL line on medicine, then removed on 5/2, and replaced with R IJ SHILEY on 5/3 for CRRT then converted back to iHD MWF   - R SHILEY removed on 5/10 second to bacteremia and replaced on 5/13   - Continue on HD MWF per HD   - Continue on Renvela 1600   - Continue on HCO3 650 tabs   - Plan for Fistulogram TODAY    INFECTIOUS DISEASE  # Aspiration   - Recurrent fever spike and CXR with RLL opacity   - BCx and SCx negative, however fever spikes improved on ABX   - Continue on Zosyn empirically (5/18 - 5/27) x 10 day course     # B Cereus Bacteremia   - Course complicated by fevers and aspiration event   - MRSA (5/1) negative   - BCx (5/2) negative   - SCx (5/4) and BAL (5/12) negative   - BCx (5/10) with bacillus cereus and cleared on (5/12).   - Case discussed with ID and s/p Vancomycin through 5/21 x 10 day course.     HEME  # AOCD with ESRD   - Anemia panel with AOCD and low # sat   - Transfused on 5/16   - EPO 10K continued on TIW   - Ferrous supplement added   - Monitor HH     VASCULAR   # RLE DVT   - CT AP (5/12) with nonocclusive RIGHT common iliac vein deep venous thrombosis  - Continue on heparin GTT and will hold on call to OR tomorrow  - RLE precautions     ENDOCRINE  # IDDM2 A1C 6.9  - Continue on NPH 4U q 6 and ISS   - Monitor and adjust insulin based on FS     SKIN  - R FEMORAL (5/1-5/2)   - R IJ SHILEY (5/3-5/10)   - R IJ SHILEY (5/13 - )     ETHICS/ GOC    - FULL CODE     DISPO - Vent facility

## 2024-05-25 LAB
ANION GAP SERPL CALC-SCNC: 21 MMOL/L — HIGH (ref 7–14)
ANISOCYTOSIS BLD QL: SIGNIFICANT CHANGE UP
APTT BLD: 111.8 SEC — HIGH (ref 24.5–35.6)
APTT BLD: 75.1 SEC — HIGH (ref 24.5–35.6)
APTT BLD: 80.7 SEC — HIGH (ref 24.5–35.6)
APTT BLD: 92.1 SEC — HIGH (ref 24.5–35.6)
APTT BLD: 94.3 SEC — HIGH (ref 24.5–35.6)
BASE EXCESS BLDV CALC-SCNC: 2.8 MMOL/L — SIGNIFICANT CHANGE UP (ref -2–3)
BASO STIPL BLD QL SMEAR: PRESENT — SIGNIFICANT CHANGE UP
BASOPHILS # BLD AUTO: 0.06 K/UL — SIGNIFICANT CHANGE UP (ref 0–0.2)
BASOPHILS NFR BLD AUTO: 0.5 % — SIGNIFICANT CHANGE UP (ref 0–2)
BLD GP AB SCN SERPL QL: NEGATIVE — SIGNIFICANT CHANGE UP
BLOOD GAS VENOUS COMPREHENSIVE RESULT: SIGNIFICANT CHANGE UP
BUN SERPL-MCNC: 20 MG/DL — SIGNIFICANT CHANGE UP (ref 7–23)
CALCIUM SERPL-MCNC: 7.7 MG/DL — LOW (ref 8.4–10.5)
CHLORIDE BLDV-SCNC: 97 MMOL/L — SIGNIFICANT CHANGE UP (ref 96–108)
CHLORIDE SERPL-SCNC: 90 MMOL/L — LOW (ref 98–107)
CO2 BLDV-SCNC: 28.4 MMOL/L — HIGH (ref 22–26)
CO2 SERPL-SCNC: 23 MMOL/L — SIGNIFICANT CHANGE UP (ref 22–31)
CREAT SERPL-MCNC: 3.16 MG/DL — HIGH (ref 0.5–1.3)
DACRYOCYTES BLD QL SMEAR: SLIGHT — SIGNIFICANT CHANGE UP
EGFR: 20 ML/MIN/1.73M2 — LOW
EOSINOPHIL # BLD AUTO: 0.32 K/UL — SIGNIFICANT CHANGE UP (ref 0–0.5)
EOSINOPHIL NFR BLD AUTO: 2.6 % — SIGNIFICANT CHANGE UP (ref 0–6)
GAS PNL BLDV: 134 MMOL/L — LOW (ref 136–145)
GIANT PLATELETS BLD QL SMEAR: PRESENT — SIGNIFICANT CHANGE UP
GLUCOSE BLDC GLUCOMTR-MCNC: 135 MG/DL — HIGH (ref 70–99)
GLUCOSE BLDC GLUCOMTR-MCNC: 135 MG/DL — HIGH (ref 70–99)
GLUCOSE BLDC GLUCOMTR-MCNC: 148 MG/DL — HIGH (ref 70–99)
GLUCOSE BLDC GLUCOMTR-MCNC: 148 MG/DL — HIGH (ref 70–99)
GLUCOSE BLDC GLUCOMTR-MCNC: 239 MG/DL — HIGH (ref 70–99)
GLUCOSE BLDV-MCNC: 167 MG/DL — HIGH (ref 70–99)
GLUCOSE SERPL-MCNC: 338 MG/DL — HIGH (ref 70–99)
HCO3 BLDV-SCNC: 27 MMOL/L — SIGNIFICANT CHANGE UP (ref 22–29)
HCT VFR BLD CALC: 23.2 % — LOW (ref 39–50)
HCT VFR BLD CALC: 23.9 % — LOW (ref 39–50)
HCT VFR BLD CALC: 24.4 % — LOW (ref 39–50)
HCT VFR BLDA CALC: 22 % — LOW (ref 39–51)
HGB BLD CALC-MCNC: 7.3 G/DL — LOW (ref 12.6–17.4)
HGB BLD-MCNC: 7.3 G/DL — LOW (ref 13–17)
HGB BLD-MCNC: 7.6 G/DL — LOW (ref 13–17)
HGB BLD-MCNC: 7.7 G/DL — LOW (ref 13–17)
HYPOCHROMIA BLD QL: SIGNIFICANT CHANGE UP
IANC: 10.26 K/UL — HIGH (ref 1.8–7.4)
IMM GRANULOCYTES NFR BLD AUTO: 0.8 % — SIGNIFICANT CHANGE UP (ref 0–0.9)
LACTATE BLDV-MCNC: 1.4 MMOL/L — SIGNIFICANT CHANGE UP (ref 0.5–2)
LYMPHOCYTES # BLD AUTO: 1.05 K/UL — SIGNIFICANT CHANGE UP (ref 1–3.3)
LYMPHOCYTES # BLD AUTO: 8.4 % — LOW (ref 13–44)
MAGNESIUM SERPL-MCNC: 2 MG/DL — SIGNIFICANT CHANGE UP (ref 1.6–2.6)
MANUAL SMEAR VERIFICATION: SIGNIFICANT CHANGE UP
MCHC RBC-ENTMCNC: 20.8 PG — LOW (ref 27–34)
MCHC RBC-ENTMCNC: 20.9 PG — LOW (ref 27–34)
MCHC RBC-ENTMCNC: 20.9 PG — LOW (ref 27–34)
MCHC RBC-ENTMCNC: 31.5 GM/DL — LOW (ref 32–36)
MCHC RBC-ENTMCNC: 31.6 GM/DL — LOW (ref 32–36)
MCHC RBC-ENTMCNC: 31.8 GM/DL — LOW (ref 32–36)
MCV RBC AUTO: 65.8 FL — LOW (ref 80–100)
MCV RBC AUTO: 65.8 FL — LOW (ref 80–100)
MCV RBC AUTO: 66.5 FL — LOW (ref 80–100)
MICROCYTES BLD QL: SIGNIFICANT CHANGE UP
MONOCYTES # BLD AUTO: 0.75 K/UL — SIGNIFICANT CHANGE UP (ref 0–0.9)
MONOCYTES NFR BLD AUTO: 6 % — SIGNIFICANT CHANGE UP (ref 2–14)
NEUTROPHILS # BLD AUTO: 10.26 K/UL — HIGH (ref 1.8–7.4)
NEUTROPHILS NFR BLD AUTO: 81.7 % — HIGH (ref 43–77)
NRBC # BLD: 0 /100 WBCS — SIGNIFICANT CHANGE UP (ref 0–0)
NRBC # BLD: 1 /100 WBCS — HIGH (ref 0–0)
NRBC # FLD: 0.09 K/UL — HIGH (ref 0–0)
NRBC # FLD: 0.09 K/UL — HIGH (ref 0–0)
NRBC # FLD: 0.1 K/UL — HIGH (ref 0–0)
OVALOCYTES BLD QL SMEAR: SLIGHT — SIGNIFICANT CHANGE UP
PCO2 BLDV: 40 MMHG — LOW (ref 42–55)
PH BLDV: 7.44 — HIGH (ref 7.32–7.43)
PHOSPHATE SERPL-MCNC: 3.1 MG/DL — SIGNIFICANT CHANGE UP (ref 2.5–4.5)
PLAT MORPH BLD: ABNORMAL
PLATELET # BLD AUTO: 452 K/UL — HIGH (ref 150–400)
PLATELET # BLD AUTO: 484 K/UL — HIGH (ref 150–400)
PLATELET # BLD AUTO: 493 K/UL — HIGH (ref 150–400)
PLATELET COUNT - ESTIMATE: NORMAL — SIGNIFICANT CHANGE UP
PO2 BLDV: 43 MMHG — SIGNIFICANT CHANGE UP (ref 25–45)
POIKILOCYTOSIS BLD QL AUTO: SLIGHT — SIGNIFICANT CHANGE UP
POLYCHROMASIA BLD QL SMEAR: SLIGHT — SIGNIFICANT CHANGE UP
POTASSIUM BLDV-SCNC: 3.6 MMOL/L — SIGNIFICANT CHANGE UP (ref 3.5–5.1)
POTASSIUM SERPL-MCNC: 3.7 MMOL/L — SIGNIFICANT CHANGE UP (ref 3.5–5.3)
POTASSIUM SERPL-SCNC: 3.7 MMOL/L — SIGNIFICANT CHANGE UP (ref 3.5–5.3)
RBC # BLD: 3.49 M/UL — LOW (ref 4.2–5.8)
RBC # BLD: 3.63 M/UL — LOW (ref 4.2–5.8)
RBC # BLD: 3.71 M/UL — LOW (ref 4.2–5.8)
RBC # FLD: 21.2 % — HIGH (ref 10.3–14.5)
RBC # FLD: 22 % — HIGH (ref 10.3–14.5)
RBC # FLD: 22.2 % — HIGH (ref 10.3–14.5)
RBC BLD AUTO: ABNORMAL
RH IG SCN BLD-IMP: POSITIVE — SIGNIFICANT CHANGE UP
SAO2 % BLDV: 70.9 % — SIGNIFICANT CHANGE UP (ref 67–88)
SCHISTOCYTES BLD QL AUTO: SLIGHT — SIGNIFICANT CHANGE UP
SODIUM SERPL-SCNC: 134 MMOL/L — LOW (ref 135–145)
TARGETS BLD QL SMEAR: SLIGHT — SIGNIFICANT CHANGE UP
VARIANT LYMPHS # BLD: 1.8 % — SIGNIFICANT CHANGE UP (ref 0–6)
WBC # BLD: 11.35 K/UL — HIGH (ref 3.8–10.5)
WBC # BLD: 11.52 K/UL — HIGH (ref 3.8–10.5)
WBC # BLD: 12.54 K/UL — HIGH (ref 3.8–10.5)
WBC # FLD AUTO: 11.35 K/UL — HIGH (ref 3.8–10.5)
WBC # FLD AUTO: 11.52 K/UL — HIGH (ref 3.8–10.5)
WBC # FLD AUTO: 12.54 K/UL — HIGH (ref 3.8–10.5)

## 2024-05-25 PROCEDURE — 99233 SBSQ HOSP IP/OBS HIGH 50: CPT

## 2024-05-25 PROCEDURE — 71045 X-RAY EXAM CHEST 1 VIEW: CPT | Mod: 26

## 2024-05-25 RX ORDER — HEPARIN SODIUM 5000 [USP'U]/ML
800 INJECTION INTRAVENOUS; SUBCUTANEOUS
Qty: 25000 | Refills: 0 | Status: DISCONTINUED | OUTPATIENT
Start: 2024-05-25 | End: 2024-05-26

## 2024-05-25 RX ORDER — ACETAMINOPHEN 500 MG
325 TABLET ORAL ONCE
Refills: 0 | Status: COMPLETED | OUTPATIENT
Start: 2024-05-25 | End: 2024-05-25

## 2024-05-25 RX ADMIN — HEPARIN SODIUM 900 UNIT(S)/HR: 5000 INJECTION INTRAVENOUS; SUBCUTANEOUS at 08:19

## 2024-05-25 RX ADMIN — CARVEDILOL PHOSPHATE 12.5 MILLIGRAM(S): 80 CAPSULE, EXTENDED RELEASE ORAL at 08:19

## 2024-05-25 RX ADMIN — Medication 325 MILLIGRAM(S): at 22:01

## 2024-05-25 RX ADMIN — Medication 650 MILLIGRAM(S): at 11:34

## 2024-05-25 RX ADMIN — Medication 650 MILLIGRAM(S): at 05:21

## 2024-05-25 RX ADMIN — CHLORHEXIDINE GLUCONATE 15 MILLILITER(S): 213 SOLUTION TOPICAL at 17:44

## 2024-05-25 RX ADMIN — Medication 3 MILLILITER(S): at 06:55

## 2024-05-25 RX ADMIN — SODIUM CHLORIDE 4 MILLILITER(S): 9 INJECTION INTRAMUSCULAR; INTRAVENOUS; SUBCUTANEOUS at 06:55

## 2024-05-25 RX ADMIN — CHLORHEXIDINE GLUCONATE 1 APPLICATION(S): 213 SOLUTION TOPICAL at 05:21

## 2024-05-25 RX ADMIN — HEPARIN SODIUM 800 UNIT(S)/HR: 5000 INJECTION INTRAVENOUS; SUBCUTANEOUS at 16:40

## 2024-05-25 RX ADMIN — PIPERACILLIN AND TAZOBACTAM 25 GRAM(S): 4; .5 INJECTION, POWDER, LYOPHILIZED, FOR SOLUTION INTRAVENOUS at 16:40

## 2024-05-25 RX ADMIN — Medication 100 MILLIGRAM(S): at 21:27

## 2024-05-25 RX ADMIN — Medication 100 MILLIGRAM(S): at 03:12

## 2024-05-25 RX ADMIN — Medication 3 MILLILITER(S): at 20:35

## 2024-05-25 RX ADMIN — HEPARIN SODIUM 800 UNIT(S)/HR: 5000 INJECTION INTRAVENOUS; SUBCUTANEOUS at 19:37

## 2024-05-25 RX ADMIN — SODIUM CHLORIDE 4 MILLILITER(S): 9 INJECTION INTRAMUSCULAR; INTRAVENOUS; SUBCUTANEOUS at 20:37

## 2024-05-25 RX ADMIN — HEPARIN SODIUM 900 UNIT(S)/HR: 5000 INJECTION INTRAVENOUS; SUBCUTANEOUS at 02:17

## 2024-05-25 RX ADMIN — Medication 650 MILLIGRAM(S): at 21:27

## 2024-05-25 RX ADMIN — ATORVASTATIN CALCIUM 20 MILLIGRAM(S): 80 TABLET, FILM COATED ORAL at 21:27

## 2024-05-25 RX ADMIN — PIPERACILLIN AND TAZOBACTAM 25 GRAM(S): 4; .5 INJECTION, POWDER, LYOPHILIZED, FOR SOLUTION INTRAVENOUS at 05:21

## 2024-05-25 RX ADMIN — HUMAN INSULIN 4 UNIT(S): 100 INJECTION, SUSPENSION SUBCUTANEOUS at 17:43

## 2024-05-25 RX ADMIN — Medication 81 MILLIGRAM(S): at 11:34

## 2024-05-25 RX ADMIN — CHLORHEXIDINE GLUCONATE 15 MILLILITER(S): 213 SOLUTION TOPICAL at 05:21

## 2024-05-25 RX ADMIN — HEPARIN SODIUM 800 UNIT(S)/HR: 5000 INJECTION INTRAVENOUS; SUBCUTANEOUS at 22:02

## 2024-05-25 RX ADMIN — Medication 325 MILLIGRAM(S): at 22:31

## 2024-05-25 RX ADMIN — CARVEDILOL PHOSPHATE 12.5 MILLIGRAM(S): 80 CAPSULE, EXTENDED RELEASE ORAL at 21:27

## 2024-05-25 RX ADMIN — Medication 4: at 23:39

## 2024-05-25 RX ADMIN — HUMAN INSULIN 4 UNIT(S): 100 INJECTION, SUSPENSION SUBCUTANEOUS at 23:39

## 2024-05-25 RX ADMIN — Medication 650 MILLIGRAM(S): at 18:38

## 2024-05-25 RX ADMIN — Medication 300 MILLIGRAM(S): at 11:34

## 2024-05-25 RX ADMIN — HUMAN INSULIN 4 UNIT(S): 100 INJECTION, SUSPENSION SUBCUTANEOUS at 11:33

## 2024-05-25 NOTE — PROGRESS NOTE ADULT - ASSESSMENT
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 Baclofen started and course complicated by AMS second to baclofen toxicity requiring intubation and MICU transfer. Course further complicated by LEFT talya and cerebellum stroke, R AVF stenosis, aspiration and B Cereus bacteremia and RLE DVT. s/p trach and transferred to RCU 5/16    NEUROLOGY  # AMS   - MRI HEAD (5/6) with punctate foci in the left talya and left cerebellum with concern for acute infarct.   - MRA HEAD and NECK (5/6) with no large vessel occlusion or stenosis.  - Continue on aspirin and hold DAPT for now pending procedures   - Continue on Lipitor for medical management   - Supportive care     CARDIOVASCULAR  # CP with NSTEMI vs demand ischemia with HTN   - Admitted for CP and HTN with peaked T WAVES and ECG with ST deviation on admission   - Troponins elevated on admission with negative CKMB   - TTE (4/30) with EF 72, normal LVRVSF, and no regional wall motion abnormalities   - Case discussed with cardiology and no acute need for cath. DAPT loaded on 5/4 and plan for medical management with ASA, lipitor and heparin GTT.     # Septic vs vasoplegic shock  # Hx of HTN   - Home BP medications held and pressors weaned off   - Continue on coreg 12.5 and hydralazine 100 TID   - Monitor BP with goal 150/90s    RESPIRATORY  # Respiratory failure   - AMS noted with baclofen toxicity requiring intubation   - Attempted extubation in MICU however course complicated by aspiration and prolonged course and now s/p tracheostomy with IP on 5/14  - Continue on vent 12/500/5/30 and able to tolerate SBT 10/5 x several hours but limited by weakness   - Continue on nebs and HTS Q12H for now     GI  # Dysphagia   - s/p surgical PEG 5/17   - Continue on tube feeds via PEG     RENAL  # ESRD on HD MWF with R BCF  # Fistula stenosis ?  - VA AVF (5/2) with Right radiocephalic arteriovenous fistula has no hemodynamically significant stenosis present at the anastomotic site ( cm/sec, EDV 49 cm/sec). The usable segment is partially thrombosed with minimal patency.  - Required R FEMORAL line on medicine, then removed on 5/2, and replaced with R IJ SHILEY on 5/3 for CRRT then converted back to iHD MWF   - R SHILEY removed on 5/10 second to bacteremia and replaced on 5/13   - Continue on HD MWF per HD   - Continue on Renvela 1600   - Continue on HCO3 650 tabs   - Plan for Fistulogram TODAY    INFECTIOUS DISEASE  # Aspiration   - Recurrent fever spike and CXR with RLL opacity   - BCx and SCx negative, however fever spikes improved on ABX   - Continue on Zosyn empirically (5/18 - 5/27) x 10 day course     # B Cereus Bacteremia   - Course complicated by fevers and aspiration event   - MRSA (5/1) negative   - BCx (5/2) negative   - SCx (5/4) and BAL (5/12) negative   - BCx (5/10) with bacillus cereus and cleared on (5/12).   - Case discussed with ID and s/p Vancomycin through 5/21 x 10 day course.     HEME  # AOCD with ESRD   - Anemia panel with AOCD and low # sat   - Transfused on 5/16   - EPO 10K continued on TIW   - Ferrous supplement added   - Monitor HH     VASCULAR   # RLE DVT   - CT AP (5/12) with nonocclusive RIGHT common iliac vein deep venous thrombosis  - Continue on heparin GTT and will hold on call to OR tomorrow  - RLE precautions     ENDOCRINE  # IDDM2 A1C 6.9  - Continue on NPH 4U q 6 and ISS   - Monitor and adjust insulin based on FS     SKIN  - R FEMORAL (5/1-5/2)   - R IJ SHILEY (5/3-5/10)   - R IJ SHILEY (5/13 - )     ETHICS/ GOC    - FULL CODE     DISPO - Vent facility    72 YO M with PMHx of ESRD on HD MWF, HTN, and 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 Baclofen started and course complicated by AMS second to baclofen toxicity requiring intubation and MICU transfer. Course further complicated by LEFT talya and cerebellum stroke, R AVF stenosis, aspiration and B Cereus bacteremia and RLE DVT. s/p trach and transferred to RCU 5/16    NEUROLOGY  # AMS   - MRI HEAD (5/6) with punctate foci in the left talya and left cerebellum with concern for acute infarct.   - MRA HEAD and NECK (5/6) with no large vessel occlusion or stenosis.  - Continue on aspirin and hold DAPT for now pending procedures   - Continue on Lipitor for medical management   - Supportive care     CARDIOVASCULAR  # CP with NSTEMI vs demand ischemia with HTN   - Admitted for CP and HTN with peaked T WAVES and ECG with ST deviation on admission   - Troponins elevated on admission with negative CKMB   - TTE (4/30) with EF 72, normal LVRVSF, and no regional wall motion abnormalities   - Case discussed with cardiology and no acute need for cath. DAPT loaded on 5/4 and plan for medical management with ASA, lipitor and heparin GTT.     # Septic vs vasoplegic shock  # Hx of HTN   - Home BP medications held and pressors weaned off   - Continue on coreg 12.5 and hydralazine 100 TID   - Monitor BP with goal 150/90s    RESPIRATORY  # Respiratory failure   - AMS noted with baclofen toxicity requiring intubation   - Attempted extubation in MICU however course complicated by aspiration and prolonged course and now s/p tracheostomy with IP on 5/14  - Continue on vent 12/500/5/30 and able to tolerate SBT 10/5 x several hours but limited by weakness   - Continue on nebs and HTS Q12H for now     GI  # Dysphagia   - s/p surgical PEG 5/17   - Continue on tube feeds via PEG     RENAL  # ESRD on HD MWF with R BCF  # Fistula stenosis ?  - VA AVF (5/2) with Right radiocephalic arteriovenous fistula has no hemodynamically significant stenosis present at the anastomotic site ( cm/sec, EDV 49 cm/sec). The usable segment is partially thrombosed with minimal patency.  - Required R FEMORAL line on medicine, then removed on 5/2, and replaced with R IJ SHILEY on 5/3 for CRRT then converted back to iHD MWF   - R SHILEY removed on 5/10 second to bacteremia and replaced on 5/13   - Continue on HD MWF per HD   - Continue on Renvela 1600   - Continue on HCO3 650 tabs   - Plan for Fistulogram still pending    INFECTIOUS DISEASE  # Aspiration   - Recurrent fever spike and CXR with RLL opacity   - BCx and SCx negative, however fever spikes improved on ABX   - Continue on Zosyn empirically (5/18 - 5/27) x 10 day course     # B Cereus Bacteremia   - Course complicated by fevers and aspiration event   - MRSA (5/1) negative   - BCx (5/2) negative   - SCx (5/4) and BAL (5/12) negative   - BCx (5/10) with bacillus cereus and cleared on (5/12).   - Case discussed with ID and s/p Vancomycin through 5/21 x 10 day course.     HEME  # AOCD with ESRD   - Anemia panel with AOCD and low # sat   - Transfused on 5/16   - EPO 10K continued on TIW   - Ferrous supplement added   - Monitor HH     VASCULAR   # RLE DVT   - CT AP (5/12) with nonocclusive RIGHT common iliac vein deep venous thrombosis  - Continue on heparin GTT and will hold on call to OR when planned  - RLE precautions     ENDOCRINE  # IDDM2 A1C 6.9  - Continue on NPH 4U q 6 and ISS   - Monitor and adjust insulin based on FS     SKIN  - R FEMORAL (5/1-5/2)   - R IJ SHILEY (5/3-5/10)   - R IJ SHILEY (5/13 - )     ETHICS/ GOC    - FULL CODE     DISPO - Vent facility

## 2024-05-25 NOTE — PROGRESS NOTE ADULT - ASSESSMENT
73-year-old male past medical history hypertension, ESRD on dialysis Monday Wednesday Friday, diabetes insulin-dependent presents with hiccups patient endorses persistent hiccups for the last 2 days. Nephrology consulted for HD needs.    A/P  ESRD:  Center: [place]  Nephrologist: Dr. Hardwick  Access: R AVF  MWF schedule  Vascular for fistulogram   s/p femoral shiley on 5/1, now removed  s/p placement of IJ shiley 5/3  Stopped CRRT   Restarted IHD  s/p HD 5/7 UF 1778; treatment terminated early due to hypotension   S/P MRA head/neck w/ gadolinium --> Received HD on 5/9 and 5/10.  5/10 Will need to dialyze 3 days consecutively--> plan for HD on 5/11 5/11 shiley removed due to bacteremia; did not receive HD.  Line holiday  5/12 - BUN worsened; K up trending.    5/13 Shiley placed.  5/17: s/p PEG placement.   5/21 IR consulted for shiley exchange; IR recommended to keep current shiley in place, with pending fistulogram   5/25 - Pt still did not go for fistulogram d/t scheduling issues.  Please exchange shiley that was placed on 5/13 or switch to PC. D/W team.  Continue HD MWF   Consent obtained and placed in ED chart  Renal diet  Monitor BMP  consider [facility] for rehab where his outpatient Nephro-Dr. Hardwick can follow him    Hyperkalemia/Hypokalemia  Improved w/ HD.  C/W HD schedule as above.  Lokelma 10gm PRN for K >5.3 on non-HD days  PhosNaK given 5/21 and again 5/23.  K stable.  Low K diet.  Monitor closely     HTN:  BP fluctuating; suboptimal.  On carvedilol 12.5mg BID, hydralazine 100mg TID.  Currently off nifedipine --> consider restarting nifedipine @ 30mg if BP remains suboptimal.  UF w/ HD as BP permits.  Monitor BP.    Anemia:  Hgb low.  + iron deficiency.  Tsat 13% - on ferrous sulfate 300mg qd via PEG.  Will hold venofer for now in view of up trending leukocytosis.  SILVA w/ HD.  Tranfuse for Hgb <7.  Monitor Hb.    CKD-MBD  Check PTH  PO4 at goal.  Sevelamer 1600mg TID d/c'ed 5/21.  S/p PhosNaK x2 doses 5/23.  PO4 WNL.  Monitor Ca, PO4 daily    Hypocalcemia:  In setting of CKD vs. hypoalbuminemia.  Optimize albumin.  Corrected Ca WNL.  Replete as needed.  Monitor Ca.

## 2024-05-25 NOTE — PROGRESS NOTE ADULT - SUBJECTIVE AND OBJECTIVE BOX
[facility]   MD ELIANE BHAGAT MD ANGELA WONG, PA QIAN CHEN, NP    TEL:  OFFICE: [phone]  From 5pm-7am Answering Service [phone]    -- RENAL FOLLOW UP NOTE ---Date of Service 05-25-24 @ 13:50    Patient is a 73y old  Male who presents with a chief complaint of Unstable angina, abn EKG.    Patient seen and examined at bedside.  Trached to vent - no respiratory distress observed.    VITALS:  T(F): 98.9 (05-25-24 @ 12:05), Max: 98.9 (05-25-24 @ 08:00)  HR: 63 (05-25-24 @ 12:05)  BP: 164/47 (05-25-24 @ 12:05)  RR: 19 (05-25-24 @ 12:05)  SpO2: 99% (05-25-24 @ 12:05)  Wt(kg): --    05-24 @ 07:01  -  05-25 @ 07:00  --------------------------------------------------------  IN: 1120 mL / OUT: 2400 mL / NET: -1280 mL      PHYSICAL EXAM:  General: NAD  Neck: No JVD  Respiratory: CTAB, no wheezes, rales or rhonchi.  Trached to vent.  Cardiovascular: S1, S2, RRR  Gastrointestinal: BS+, soft, NT/ND  Extremities: No peripheral edema    Hospital Medications:   MEDICATIONS  (STANDING):  albuterol/ipratropium for Nebulization 3 milliLiter(s) Nebulizer every 12 hours  aspirin  chewable 81 milliGRAM(s) Oral daily  atorvastatin 20 milliGRAM(s) Oral at bedtime  carvedilol 12.5 milliGRAM(s) Oral every 12 hours  chlorhexidine 0.12% Liquid 15 milliLiter(s) Oral Mucosa every 12 hours  chlorhexidine 2% Cloths 1 Application(s) Topical <User Schedule>  dextrose 10% Bolus 125 milliLiter(s) IV Bolus once  dextrose 5%. 1000 milliLiter(s) (50 mL/Hr) IV Continuous <Continuous>  dextrose 5%. 1000 milliLiter(s) (100 mL/Hr) IV Continuous <Continuous>  dextrose 50% Injectable 12.5 Gram(s) IV Push once  dextrose 50% Injectable 25 Gram(s) IV Push once  epoetin reilly (EPOGEN) Injectable 75192 Unit(s) IV Push <User Schedule>  ferrous    sulfate Liquid 300 milliGRAM(s) Enteral Tube daily  glucagon  Injectable 1 milliGRAM(s) IntraMuscular once  heparin  Infusion. 900 Unit(s)/Hr (9 mL/Hr) IV Continuous <Continuous>  hydrALAZINE 100 milliGRAM(s) Oral three times a day  insulin lispro (ADMELOG) corrective regimen sliding scale   SubCutaneous every 6 hours  insulin NPH human recombinant 4 Unit(s) SubCutaneous every 6 hours  piperacillin/tazobactam IVPB.. 3.375 Gram(s) IV Intermittent every 12 hours  sodium bicarbonate 650 milliGRAM(s) Oral every 8 hours  sodium chloride 3%  Inhalation 4 milliLiter(s) Inhalation every 12 hours      LABS:  05-25    134<L>  |  90<L>  |  20  ----------------------------<  338<H>  3.7   |  23  |  3.16<H>    Ca    7.7<L>      25 May 2024 06:35  Phos  3.1     05-25  Mg     2.00     05-25      Creatinine Trend: 3.16 <--, 4.64 <--, 3.29 <--, 4.22 <--, 3.58 <--, 6.25 <--, 5.08 <--    Phosphorus: 3.1 mg/dL (05-25 @ 06:35)                          7.6    11.35 )-----------( 493      ( 25 May 2024 06:35 )             23.9     Urine Studies:  Urinalysis - [05-25-24 @ 06:35]      Color  / Appearance  / SG  / pH       Gluc 338 / Ketone   / Bili  / Urobili        Blood  / Protein  / Leuk Est  / Nitrite       RBC  / WBC  / Hyaline  / Gran  / Sq Epi  / Non Sq Epi  / Bacteria       Iron 16, TIBC 121, %sat 13      [05-17-24 @ 06:00]  Ferritin 720      [05-17-24 @ 06:00]  TSH 2.60      [05-17-24 @ 06:00]  Lipid: chol 86, , HDL 27, LDL --      [05-17-24 @ 06:00]    HBsAb <3.0      [05-01-24 @ 14:42]  HBcAb Nonreact      [05-01-24 @ 14:42]

## 2024-05-25 NOTE — PROGRESS NOTE ADULT - SUBJECTIVE AND OBJECTIVE BOX
Cardiovascular Disease Progress Note  DATE OF SERVICE: 24 @ 10:05    Overnight events: No acute events overnight.  The patient is in no distress on CPAP via trach.     Otherwise review of systems negative    Objective Findings:  T(C): 37.2 (24 @ 08:00), Max: 37.2 (24 @ 08:00)  HR: 75 (24 @ 08:00) (58 - 75)  BP: 169/47 (24 @ 08:00) (98/52 - 169/47)  RR: 12 (24 @ 08:00) (12 - 22)  SpO2: 100% (24 @ 08:00) (100% - 100%)  Wt(kg): --  Daily     Daily Weight in k (24 May 2024 19:25)      Physical Exam:  Gen: NAD; Patient resting comfortably  HEENT: EOMI, Normocephalic/ atraumatic  CV: RRR, normal S1 + S2, no m/r/g  Lungs:  Normal respiratory effort; clear to auscultation bilaterally  Abd: soft, non-tender; bowel sounds present  Ext: No edema; warm and well perfused    Telemetry: n/a    Laboratory Data:                        7.6    11.35 )-----------( 493      ( 25 May 2024 06:35 )             23.9     05-25    134<L>  |  90<L>  |  20  ----------------------------<  338<H>  3.7   |  23  |  3.16<H>    Ca    7.7<L>      25 May 2024 06:35  Phos  3.1     05-25  Mg     2.00     05-25      PT/INR - ( 24 May 2024 03:30 )   PT: 14.2 sec;   INR: 1.27 ratio         PTT - ( 25 May 2024 09:10 )  PTT:111.8 sec          Inpatient Medications:  MEDICATIONS  (STANDING):  albuterol/ipratropium for Nebulization 3 milliLiter(s) Nebulizer every 12 hours  aspirin  chewable 81 milliGRAM(s) Oral daily  atorvastatin 20 milliGRAM(s) Oral at bedtime  carvedilol 12.5 milliGRAM(s) Oral every 12 hours  chlorhexidine 0.12% Liquid 15 milliLiter(s) Oral Mucosa every 12 hours  chlorhexidine 2% Cloths 1 Application(s) Topical <User Schedule>  dextrose 10% Bolus 125 milliLiter(s) IV Bolus once  dextrose 5%. 1000 milliLiter(s) (50 mL/Hr) IV Continuous <Continuous>  dextrose 5%. 1000 milliLiter(s) (100 mL/Hr) IV Continuous <Continuous>  dextrose 50% Injectable 12.5 Gram(s) IV Push once  dextrose 50% Injectable 25 Gram(s) IV Push once  epoetin reilly (EPOGEN) Injectable 30293 Unit(s) IV Push <User Schedule>  ferrous    sulfate Liquid 300 milliGRAM(s) Enteral Tube daily  glucagon  Injectable 1 milliGRAM(s) IntraMuscular once  heparin  Infusion. 900 Unit(s)/Hr (9 mL/Hr) IV Continuous <Continuous>  hydrALAZINE 100 milliGRAM(s) Oral three times a day  insulin lispro (ADMELOG) corrective regimen sliding scale   SubCutaneous every 6 hours  insulin NPH human recombinant 4 Unit(s) SubCutaneous every 6 hours  piperacillin/tazobactam IVPB.. 3.375 Gram(s) IV Intermittent every 12 hours  sodium bicarbonate 650 milliGRAM(s) Oral every 8 hours  sodium chloride 3%  Inhalation 4 milliLiter(s) Inhalation every 12 hours      Assessment: 73 year old man with HTN, HLD, T2DM on insulin, and ESRD on HD presents with supply demand ischemia and angina.    Plan of Care:    #Type II myocardial infarction-  Secondary to distributive shock earlier on admission.   The repeat echo shows no new wall motion abnormality.   I would not pursue cath at this time given recurrent aspiration and chronic respiratory failure s/p trach .   The patient is optimized from a cardiac standpoint to proceed with fistulogram.        #Sinus bradycardia-  No evidence of AV block on admission EKG or telemetry.  No indication for pacing at this time.   - Continue Coreg with holding parameters.     #HTN-  BP labile.  Continue current regimen       #ESRD-  HD as per renal     #DVT   - R common Iliac DVT  - Hep GTT  - AC management as per vascular team.    #ACP (advance care planning)-  Advanced care planning was discussed with the patient.  Advance care planning forms were discussed. Risks, benefits and alternatives of medical treatment and procedures were discussed in detail and all questions were answered.   30 additional minutes spent addressing advance care plans.      Over 55 minutes spent on total encounter; more than 50% of the visit was spent counseling and/or coordinating care by the attending physician.      Geovani Bravo MD [facility]  Cardiovascular Disease  [phone]

## 2024-05-25 NOTE — PROGRESS NOTE ADULT - SUBJECTIVE AND OBJECTIVE BOX
Patient is a 73y old  Male who presents with a chief complaint of Unstable angina, abn EKG (25 May 2024 14:26)      SUBJECTIVE / OVERNIGHT EVENTS: fistulogram delayed to today    MEDICATIONS  (STANDING):  albuterol/ipratropium for Nebulization 3 milliLiter(s) Nebulizer every 12 hours  aspirin  chewable 81 milliGRAM(s) Oral daily  atorvastatin 20 milliGRAM(s) Oral at bedtime  carvedilol 12.5 milliGRAM(s) Oral every 12 hours  chlorhexidine 0.12% Liquid 15 milliLiter(s) Oral Mucosa every 12 hours  chlorhexidine 2% Cloths 1 Application(s) Topical <User Schedule>  dextrose 10% Bolus 125 milliLiter(s) IV Bolus once  dextrose 5%. 1000 milliLiter(s) (50 mL/Hr) IV Continuous <Continuous>  dextrose 5%. 1000 milliLiter(s) (100 mL/Hr) IV Continuous <Continuous>  dextrose 50% Injectable 12.5 Gram(s) IV Push once  dextrose 50% Injectable 25 Gram(s) IV Push once  epoetin reilly (EPOGEN) Injectable 65485 Unit(s) IV Push <User Schedule>  ferrous    sulfate Liquid 300 milliGRAM(s) Enteral Tube daily  glucagon  Injectable 1 milliGRAM(s) IntraMuscular once  heparin  Infusion. 900 Unit(s)/Hr (9 mL/Hr) IV Continuous <Continuous>  hydrALAZINE 100 milliGRAM(s) Oral three times a day  insulin lispro (ADMELOG) corrective regimen sliding scale   SubCutaneous every 6 hours  insulin NPH human recombinant 4 Unit(s) SubCutaneous every 6 hours  piperacillin/tazobactam IVPB.. 3.375 Gram(s) IV Intermittent every 12 hours  sodium bicarbonate 650 milliGRAM(s) Oral every 8 hours  sodium chloride 3%  Inhalation 4 milliLiter(s) Inhalation every 12 hours    MEDICATIONS  (PRN):  acetaminophen   Oral Liquid .. 650 milliGRAM(s) Oral every 6 hours PRN Temp greater or equal to 38C (100.4F), Moderate Pain (4 - 6)  dextrose Oral Gel 15 Gram(s) Oral once PRN Blood Glucose LESS THAN 70 milliGRAM(s)/deciliter  melatonin 3 milliGRAM(s) Oral at bedtime PRN Insomnia  sodium chloride 0.9% lock flush 10 milliLiter(s) IV Push every 1 hour PRN Pre/post blood products, medications, blood draw, and to maintain line patency      Vital Signs Last 24 Hrs  T(F): 98.9 (05-25-24 @ 12:05), Max: 98.9 (05-25-24 @ 08:00)  HR: 75 (05-25-24 @ 15:36) (60 - 75)  BP: 164/47 (05-25-24 @ 12:05) (98/52 - 169/47)  RR: 19 (05-25-24 @ 12:05) (12 - 19)  SpO2: 100% (05-25-24 @ 15:36) (99% - 100%)  Telemetry:   CAPILLARY BLOOD GLUCOSE      POCT Blood Glucose.: 135 mg/dL (25 May 2024 17:10)  POCT Blood Glucose.: 148 mg/dL (25 May 2024 11:22)  POCT Blood Glucose.: 148 mg/dL (25 May 2024 06:22)  POCT Blood Glucose.: 135 mg/dL (25 May 2024 00:26)    I&O's Summary    24 May 2024 07:01  -  25 May 2024 07:00  --------------------------------------------------------  IN: 1120 mL / OUT: 2400 mL / NET: -1280 mL        PHYSICAL EXAM:  GENERAL: NAD, well-developed  HEAD:  Atraumatic, Normocephalic  EYES: EOMI, PERRLA, conjunctiva and sclera clear  NECK: Supple, No JVD  CHEST/LUNG: Clear to auscultation bilaterally; No wheeze  HEART: Regular rate and rhythm; No murmurs, rubs, or gallops  ABDOMEN: Soft, Nontender, Nondistended; Bowel sounds present  EXTREMITIES:  2+ Peripheral Pulses, No clubbing, cyanosis, or edema  PSYCH: AAOx3  NEUROLOGY: non-focal  SKIN: No rashes or lesions    LABS:                        7.6    11.35 )-----------( 493      ( 25 May 2024 06:35 )             23.9     05-25    134<L>  |  90<L>  |  20  ----------------------------<  338<H>  3.7   |  23  |  3.16<H>    Ca    7.7<L>      25 May 2024 06:35  Phos  3.1     05-25  Mg     2.00     05-25      PT/INR - ( 24 May 2024 03:30 )   PT: 14.2 sec;   INR: 1.27 ratio         PTT - ( 25 May 2024 09:10 )  PTT:111.8 sec      Urinalysis Basic - ( 25 May 2024 06:35 )    Color: x / Appearance: x / SG: x / pH: x  Gluc: 338 mg/dL / Ketone: x  / Bili: x / Urobili: x   Blood: x / Protein: x / Nitrite: x   Leuk Esterase: x / RBC: x / WBC x   Sq Epi: x / Non Sq Epi: x / Bacteria: x        RADIOLOGY & ADDITIONAL TESTS:    Imaging Personally Reviewed:    Consultant(s) Notes Reviewed:      Care Discussed with Consultants/Other Providers:

## 2024-05-25 NOTE — PROGRESS NOTE ADULT - NS ATTEND AMEND GEN_ALL_CORE FT
Pt is a 73M w/ MHx ESRD on HD, HTN, and DMII initialled presenting to [facility] on 4/29/24 with chest pressure and hiccups admitted 2/2 concern for demand ischemia with hospital course c/b baclofen toxicity and acute ischemic CVA left talya/cerebellum c/b acute hypoxemic and hypercapnic respiratory failure s/p ETT intubation/MV with failure to wean from ventilator s/p tracheostomy. Hospital course further complicated by aspiration and B cereus bacteremia and RLE DVT. Now transferred to RCU 5/16 for further management.     Pt with acute encephalopathy while in hospital concerning for baclofen toxicity. Pt further with MRI Head (5/6) concerning for left talya and left cerebellum ischemic infarct with no vessel occlusion/stenosis on MRA. Continue on aspirin and hold DAPT for now pending procedures. Statin in place. Neurology recs appreciated. PT/OT consulted.    Pt with acute combined hypoxemic and hypercapnic respiratory failure s/p ETT intubation/MV with failure to wean from ventilator requiring tracheostomy placement (IP 5/14). SBTs as tolerated. ABG on current settings wnl. Trach care and suctioning as per RCU team. Oral hygiene and aspiration precautions in place. Wean O2 supplementation for goal O2 saturation 90-95%. Airway clearance therapy in place.     Pt initially admitted with demand ischemia versus NSTEMI. EKG concerning for ST deviation/t-wave peak with (+) troponin leak. TTE (4/30) without regional wall abnormalities. No indication for catheterization at this time. DAP initiated. Cardiology following and recs appreciated. Pt remains HD stable and clinically euvolemic. No events on telemetry.     Pt with oropharyngeal dysphagia s/p PEG placement (Surgery, 5/17). Pt tolerating feeds at goal rate. Pt with ESRD via right AVF found to have stenosis requiring temporary access. Right shiley removed (5/10) 2/2 bacteremia, replaced 5/13. Plan for fistulogram with vascular sx. HD as per nephrology team.     Hospital course further c/b aspiration PNA c/b B. cereus bacteremia (5/10) with clearance of cx 5/12, now s/p completion of Abx with Zosyn through 5/27 and Vancomycin through 5/21.     Hospital course further c/b RLE DVT, on heparin drip. Held in preparation for OR as per Vascular sx team. Will need possible permacath.     Dispo pending medical optimization. Pt full code. DVT ppx heparin drip full A/C. PT consulted. Discussed with pt and wife at bedside today, will continue to update throughout hospitalization.

## 2024-05-25 NOTE — PROGRESS NOTE ADULT - SUBJECTIVE AND OBJECTIVE BOX
Patient is a 73y Male     Patient is a 73y old  Male who presents with a chief complaint of Unstable angina, abn EKG (25 May 2024 10:05)      HPI:  73-year-old male past medical history hypertension, ESRD on dialysis Monday Wednesday Friday, diabetes insulin-dependent presents with hiccups patient endorses persistent hiccups for the last 2 days. found to have peaked T waves, a new finding. denies dizziness, N/V, denies CP, palps, abd pain (29 Apr 2024 21:15)      PAST MEDICAL & SURGICAL HISTORY:  Diabetes      Benign essential HTN      HLD (hyperlipidemia)      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      ESRD on hemodialysis      Arteriovenous fistula          MEDICATIONS  (STANDING):  albuterol/ipratropium for Nebulization 3 milliLiter(s) Nebulizer every 12 hours  aspirin  chewable 81 milliGRAM(s) Oral daily  atorvastatin 20 milliGRAM(s) Oral at bedtime  carvedilol 12.5 milliGRAM(s) Oral every 12 hours  chlorhexidine 0.12% Liquid 15 milliLiter(s) Oral Mucosa every 12 hours  chlorhexidine 2% Cloths 1 Application(s) Topical <User Schedule>  dextrose 10% Bolus 125 milliLiter(s) IV Bolus once  dextrose 5%. 1000 milliLiter(s) (100 mL/Hr) IV Continuous <Continuous>  dextrose 5%. 1000 milliLiter(s) (50 mL/Hr) IV Continuous <Continuous>  dextrose 50% Injectable 12.5 Gram(s) IV Push once  dextrose 50% Injectable 25 Gram(s) IV Push once  epoetin reilly (EPOGEN) Injectable 98044 Unit(s) IV Push <User Schedule>  ferrous    sulfate Liquid 300 milliGRAM(s) Enteral Tube daily  glucagon  Injectable 1 milliGRAM(s) IntraMuscular once  heparin  Infusion. 900 Unit(s)/Hr (9 mL/Hr) IV Continuous <Continuous>  hydrALAZINE 100 milliGRAM(s) Oral three times a day  insulin lispro (ADMELOG) corrective regimen sliding scale   SubCutaneous every 6 hours  insulin NPH human recombinant 4 Unit(s) SubCutaneous every 6 hours  piperacillin/tazobactam IVPB.. 3.375 Gram(s) IV Intermittent every 12 hours  sodium bicarbonate 650 milliGRAM(s) Oral every 8 hours  sodium chloride 3%  Inhalation 4 milliLiter(s) Inhalation every 12 hours      Allergies    No Known Allergies    Intolerances        SOCIAL HISTORY:  Denies ETOh,Smoking,     FAMILY HISTORY:  FHx: diabetes mellitus (Father, Aunt)        REVIEW OF SYSTEMS:    CONSTITUTIONAL: No weakness, fevers or chills  EYES/ENT: No visual changes;  No vertigo or throat pain   NECK: No pain or stiffness  RESPIRATORY: No cough, wheezing, hemoptysis; No shortness of breath  CARDIOVASCULAR: No chest pain or palpitations  GASTROINTESTINAL: No abdominal or epigastric pain. No nausea, vomiting, or hematemesis; No diarrhea or constipation. No melena or hematochezia.  GENITOURINARY: No dysuria, frequency or hematuria  NEUROLOGICAL: No numbness or weakness  SKIN: No itching, burning, rashes, or lesions   All other review of systems is negative unless indicated above.    VITAL:  T(C): , Max: 37.2 (05-25-24 @ 08:00)  T(F): , Max: 98.9 (05-25-24 @ 08:00)  HR: 75 (05-25-24 @ 08:00)  BP: 169/47 (05-25-24 @ 08:00)  BP(mean): --  RR: 12 (05-25-24 @ 08:00)  SpO2: 100% (05-25-24 @ 08:00)  Wt(kg): --    I and O's:    05-23 @ 07:01  -  05-24 @ 07:00  --------------------------------------------------------  IN: 897 mL / OUT: 0 mL / NET: 897 mL    05-24 @ 07:01  -  05-25 @ 07:00  --------------------------------------------------------  IN: 1120 mL / OUT: 2400 mL / NET: -1280 mL          PHYSICAL EXAM:    Constitutional: NAD  HEENT: PERRLA,   Neck: No JVD  Respiratory: CTA B/L  Cardiovascular: S1 and S2  Gastrointestinal: BS+, soft, NT/ND  Extremities: No peripheral edema  Neurological: A/O x 3, no focal deficits  Psychiatric: Normal mood, normal affect  : No Abbasi  Skin: No rashes  Access: Not applicable  Back: No CVA tenderness    LABS:                        7.6    11.35 )-----------( 493      ( 25 May 2024 06:35 )             23.9     05-25    134<L>  |  90<L>  |  20  ----------------------------<  338<H>  3.7   |  23  |  3.16<H>    Ca    7.7<L>      25 May 2024 06:35  Phos  3.1     05-25  Mg     2.00     05-25            RADIOLOGY & ADDITIONAL STUDIES:

## 2024-05-25 NOTE — PROGRESS NOTE ADULT - ASSESSMENT
73-year-old male past medical history hypertension, ESRD on dialysis Monday Wednesday Friday, diabetes insulin-dependent presents with hiccups patient endorses persistent hiccups for the last 2 days.   found to have peaked T waves, a new finding. denied dizziness, N/V, denies CP, palps, abd pain  Upon admission seen by CArd, renal and GI  found to have a distended GB prob 2/2 gastroparesis, was started on Reglan  has received 4 doses of baclofen since 4/30 2/2 hiccups, last dose on 5/1 at 5 am  had received Haldol for agitation at 11 pm on 4/30, AMS observed in pm of 5/1  AMS ongoing, RRT x 2 called  now transferred to MICU for encephalopathy requiring  airway protection on 5/2,  intubated for airway protection, was on  propofol and pressors. now off  toxicology consulted upon dx of encephalopathy, not impressed AMS 2/2 Haldol nor baclofen . AMS was 2/2 acute CVA   HD was not done timely until femoral shiley was placed 2/2 clotted RUE AVF. now removed and RIJ shiley placed on 5/3  has been nonverbal since pm 5/1.     MR brain 5/6 c/w L pontine and L cerebellar acute infarcts, no hemorrhage . as per neuro: embolic in nature.   encephalopathy probably 2/2 acute CVA, now follows commands appropriately off sedation.   no SZ focus on EEG,   off CRRT,  HD resumed as per renal.   remains intubated, now trached  off keppra  AC resumed, on Heparin drip for R common iliac DVT  completed 5 days of empiric ZOSYN on 5/7, shortly after became septic again after an extubation trial. bacteremia w B. cereus, on Vanc through 5/20 as per ID    s/p trache placement by pulm,   IR unable to find a window for G-J tube. PEG placed by surgery 5/17, resume feeds when cleared by surgery  HD via R IJ.  Tmax overnight( 5/18)  101, cxr c/w RLL PNA, now on Zosyn, blood Cx s sent. remains on Vanco for B. cereus bacteremia   leukocytosis resolved  EKG changes on 5/3 c/w NSTEMI loaded w DAPT , was  on Heparin drip x 48 hrs. rpt TTE is unchanged  ID, Neuro , renal, cardiology following.  clotted RUE AVF. fistulogram delayed to today  HD via RIght IJ Shiley.   LP done, no sign of meningitis.   NEUROLOGY     - CTH without acute findings   - LP done, no acute findings  - MR brain w acute infarcts: L talya and L cerebellum, nonhemorrhagic  - no Sz focus on EEG    CARDIOVASCULAR   - ptn never had CP. just peaked T waves. was awaiting ischemic study w nucl stress test  - TTE showing EF 72%, rpt unchanged  - EKG changes on 5/3, loaded w DAPT now on Heparin drip 2/2 DVT    GI  - PEG placed, npo w tube feeds  - Gastroparesis       RENAL   -  HD as per renal  - via R IJ shiley  -  fistula study of RUE  as per Vascular : 5/24  - permacath some time after fistulogram       INFECTIOUS DISEASE     completed a course of Zosyn, then became septic, bacteremic a B. cereus, completed 3 days of Meropenem, completed vanc through 5/21  on Zosyn since 5/18 for RLL PNA, afebrile, completed 5/23    ENDOCRINE   - DM  - cw ISS    DVT  - RLE , on Heparin drip    GOC:  Full code

## 2024-05-25 NOTE — PROGRESS NOTE ADULT - SUBJECTIVE AND OBJECTIVE BOX
TEAM [ *C* ] Surgery Daily Progress Note  =====================================================    SUBJECTIVE:  Patient seen and examined at bedside on AM rounds. Resting in bed, will go to OR today (added on).     ALLERGIES:  No Known Allergies      --------------------------------------------------------------------------------------    MEDICATIONS:    Neurologic Medications  acetaminophen   Oral Liquid .. 650 milliGRAM(s) Oral every 6 hours PRN Temp greater or equal to 38C (100.4F), Moderate Pain (4 - 6)  melatonin 3 milliGRAM(s) Oral at bedtime PRN Insomnia    Respiratory Medications  albuterol/ipratropium for Nebulization 3 milliLiter(s) Nebulizer every 12 hours  sodium chloride 3%  Inhalation 4 milliLiter(s) Inhalation every 12 hours    Cardiovascular Medications  carvedilol 12.5 milliGRAM(s) Oral every 12 hours  hydrALAZINE 100 milliGRAM(s) Oral three times a day    Gastrointestinal Medications  dextrose 10% Bolus 125 milliLiter(s) IV Bolus once  dextrose 5%. 1000 milliLiter(s) IV Continuous <Continuous>  dextrose 5%. 1000 milliLiter(s) IV Continuous <Continuous>  ferrous    sulfate Liquid 300 milliGRAM(s) Enteral Tube daily  sodium bicarbonate 650 milliGRAM(s) Oral every 8 hours  sodium chloride 0.9% lock flush 10 milliLiter(s) IV Push every 1 hour PRN Pre/post blood products, medications, blood draw, and to maintain line patency    Genitourinary Medications    Hematologic/Oncologic Medications  aspirin  chewable 81 milliGRAM(s) Oral daily  epoetin reilly (EPOGEN) Injectable 24460 Unit(s) IV Push <User Schedule>  heparin  Infusion. 900 Unit(s)/Hr IV Continuous <Continuous>    Antimicrobial/Immunologic Medications  piperacillin/tazobactam IVPB.. 3.375 Gram(s) IV Intermittent every 12 hours    Endocrine/Metabolic Medications  atorvastatin 20 milliGRAM(s) Oral at bedtime  dextrose 50% Injectable 12.5 Gram(s) IV Push once  dextrose 50% Injectable 25 Gram(s) IV Push once  dextrose Oral Gel 15 Gram(s) Oral once PRN Blood Glucose LESS THAN 70 milliGRAM(s)/deciliter  glucagon  Injectable 1 milliGRAM(s) IntraMuscular once  insulin lispro (ADMELOG) corrective regimen sliding scale   SubCutaneous every 6 hours  insulin NPH human recombinant 4 Unit(s) SubCutaneous every 6 hours    Topical/Other Medications  chlorhexidine 0.12% Liquid 15 milliLiter(s) Oral Mucosa every 12 hours  chlorhexidine 2% Cloths 1 Application(s) Topical <User Schedule>    --------------------------------------------------------------------------------------    VITAL SIGNS:  T(C): 37.2 (05-25-24 @ 12:05), Max: 37.2 (05-25-24 @ 08:00)  HR: 63 (05-25-24 @ 12:05) (59 - 75)  BP: 164/47 (05-25-24 @ 12:05) (98/52 - 169/47)  RR: 19 (05-25-24 @ 12:05) (12 - 22)  SpO2: 99% (05-25-24 @ 12:05) (99% - 100%)  --------------------------------------------------------------------------------------    INS AND OUTS:    05-24-24 @ 07:01  -  05-25-24 @ 07:00  --------------------------------------------------------  IN: 1120 mL / OUT: 2400 mL / NET: -1280 mL      --------------------------------------------------------------------------------------      EXAM      General: NAD, resting in bed comfortably   Cardiac: regular rate, warm and well perfused  Respiratory: Trached on ventilator  Extremities: RUE with palpable thrill and palpable radial and ulnar pulses     --------------------------------------------------------------------------------------    LABS                          7.6    11.35 )-----------( 493      ( 25 May 2024 06:35 )             23.9     05-25    134<L>  |  90<L>  |  20  ----------------------------<  338<H>  3.7   |  23  |  3.16<H>    Ca    7.7<L>      25 May 2024 06:35  Phos  3.1     05-25  Mg     2.00     05-25      PT/INR - ( 24 May 2024 03:30 )   PT: 14.2 sec;   INR: 1.27 ratio         PTT - ( 25 May 2024 09:10 )  PTT:111.8 sec  Urinalysis Basic - ( 25 May 2024 06:35 )    Color: x / Appearance: x / SG: x / pH: x  Gluc: 338 mg/dL / Ketone: x  / Bili: x / Urobili: x   Blood: x / Protein: x / Nitrite: x   Leuk Esterase: x / RBC: x / WBC x   Sq Epi: x / Non Sq Epi: x / Bacteria: x      No Known Allergies    T(C): 37.2 (05-25-24 @ 12:05), Max: 37.2 (05-25-24 @ 08:00)  HR: 63 (05-25-24 @ 12:05) (59 - 75)  BP: 164/47 (05-25-24 @ 12:05) (98/52 - 169/47)  RR: 19 (05-25-24 @ 12:05) (12 - 22)  SpO2: 99% (05-25-24 @ 12:05) (99% - 100%)    05-24-24 @ 07:01  -  05-25-24 @ 07:00  --------------------------------------------------------  IN: 1120 mL / OUT: 2400 mL / NET: -1280 mL

## 2024-05-25 NOTE — PROGRESS NOTE ADULT - ASSESSMENT
73M w/ PMHx hypertension, ESRD on HD via R radiocephalic fistula (MWF), DM, HTN, p/w hiccups and peaked T waves, admitted to MICU for c/f baclofen toxicity. Patient received 1x HD on admission. R femoral shiley removed 5/2, had 2 RRT for AMS and failed HD via AVF 5/3. S/p emergent R IJ shiley placement 5/3, started CRRT which is now transitioned back to iHD. Vascular planning RUE fistulogram when medically optimized. Extubated to HFNC then reintubated 5/12 for c/f aspiration w/ hypoxic resp failure. S/p trach 5/14. Downgraded from MICU to RCU 5/16.    Recommendations:   - Planning for fistulogram today (added on)  - hold heparin gtt on call to the OR  - hep gtt for nonocclusive R common iliac DVT  - Consent signed by wife, in chart  - Med/cards/ID appreciate documented risk stratification  - Continue HD via replaced R IJ shiley  - Global care per primary     Vascular Surgery  [id number]

## 2024-05-25 NOTE — PROGRESS NOTE ADULT - SUBJECTIVE AND OBJECTIVE BOX
CHIEF COMPLAINT:Patient is a 73y old  Male who presents with a chief complaint of Unstable angina, abn EKG (24 May 2024 21:41)      INTERVAL EVENTS:     ROS: Seen by bedside during AM rounds     OBJECTIVE:  ICU Vital Signs Last 24 Hrs  T(C): 37.2 (25 May 2024 08:00), Max: 37.2 (25 May 2024 08:00)  T(F): 98.9 (25 May 2024 08:00), Max: 98.9 (25 May 2024 08:00)  HR: 75 (25 May 2024 08:00) (58 - 75)  BP: 169/47 (25 May 2024 08:00) (98/52 - 169/47)  BP(mean): --  ABP: --  ABP(mean): --  RR: 12 (25 May 2024 08:00) (12 - 22)  SpO2: 100% (25 May 2024 08:00) (100% - 100%)    O2 Parameters below as of 25 May 2024 08:00  Patient On (Oxygen Delivery Method): ventilator    O2 Concentration (%): 30      Mode: CPAP with PS, FiO2: 30, PEEP: 5, PS: 15    05-24 @ 07:01  -  05-25 @ 07:00  --------------------------------------------------------  IN: 1120 mL / OUT: 2400 mL / NET: -1280 mL      CAPILLARY BLOOD GLUCOSE      POCT Blood Glucose.: 148 mg/dL (25 May 2024 06:22)      PHYSICAL EXAM:  General:   HEENT:   Lymph Nodes:  Neck:   Respiratory:   Cardiovascular:   Abdomen:   Extremities:   Skin:   Neurological:  Psychiatry:    Mode: CPAP with PS  FiO2: 30  PEEP: 5  PS: 15      HOSPITAL MEDICATIONS:  MEDICATIONS  (STANDING):  albuterol/ipratropium for Nebulization 3 milliLiter(s) Nebulizer every 12 hours  aspirin  chewable 81 milliGRAM(s) Oral daily  atorvastatin 20 milliGRAM(s) Oral at bedtime  carvedilol 12.5 milliGRAM(s) Oral every 12 hours  chlorhexidine 0.12% Liquid 15 milliLiter(s) Oral Mucosa every 12 hours  chlorhexidine 2% Cloths 1 Application(s) Topical <User Schedule>  dextrose 10% Bolus 125 milliLiter(s) IV Bolus once  dextrose 5%. 1000 milliLiter(s) (100 mL/Hr) IV Continuous <Continuous>  dextrose 5%. 1000 milliLiter(s) (50 mL/Hr) IV Continuous <Continuous>  dextrose 50% Injectable 25 Gram(s) IV Push once  dextrose 50% Injectable 12.5 Gram(s) IV Push once  epoetin reilly (EPOGEN) Injectable 01699 Unit(s) IV Push <User Schedule>  ferrous    sulfate Liquid 300 milliGRAM(s) Enteral Tube daily  glucagon  Injectable 1 milliGRAM(s) IntraMuscular once  heparin  Infusion. 900 Unit(s)/Hr (9 mL/Hr) IV Continuous <Continuous>  hydrALAZINE 100 milliGRAM(s) Oral three times a day  insulin lispro (ADMELOG) corrective regimen sliding scale   SubCutaneous every 6 hours  insulin NPH human recombinant 4 Unit(s) SubCutaneous every 6 hours  piperacillin/tazobactam IVPB.. 3.375 Gram(s) IV Intermittent every 12 hours  sodium bicarbonate 650 milliGRAM(s) Oral every 8 hours  sodium chloride 3%  Inhalation 4 milliLiter(s) Inhalation every 12 hours    MEDICATIONS  (PRN):  acetaminophen   Oral Liquid .. 650 milliGRAM(s) Oral every 6 hours PRN Temp greater or equal to 38C (100.4F), Moderate Pain (4 - 6)  dextrose Oral Gel 15 Gram(s) Oral once PRN Blood Glucose LESS THAN 70 milliGRAM(s)/deciliter  melatonin 3 milliGRAM(s) Oral at bedtime PRN Insomnia  sodium chloride 0.9% lock flush 10 milliLiter(s) IV Push every 1 hour PRN Pre/post blood products, medications, blood draw, and to maintain line patency      LABS:                        7.6    11.35 )-----------( 493      ( 25 May 2024 06:35 )             23.9     05-25    134<L>  |  90<L>  |  20  ----------------------------<  338<H>  3.7   |  23  |  3.16<H>    Ca    7.7<L>      25 May 2024 06:35  Phos  3.1     05-25  Mg     2.00     05-25      PT/INR - ( 24 May 2024 03:30 )   PT: 14.2 sec;   INR: 1.27 ratio         PTT - ( 25 May 2024 06:57 )  PTT:92.1 sec  Urinalysis Basic - ( 25 May 2024 06:35 )    Color: x / Appearance: x / SG: x / pH: x  Gluc: 338 mg/dL / Ketone: x  / Bili: x / Urobili: x   Blood: x / Protein: x / Nitrite: x   Leuk Esterase: x / RBC: x / WBC x   Sq Epi: x / Non Sq Epi: x / Bacteria: x         CHIEF COMPLAINT:Patient is a 73y old  Male who presents with a chief complaint of Unstable angina, abn EKG (24 May 2024 21:41)      INTERVAL EVENTS:     ROS: Seen by bedside during AM rounds     OBJECTIVE:  ICU Vital Signs Last 24 Hrs  T(C): 37.2 (25 May 2024 08:00), Max: 37.2 (25 May 2024 08:00)  T(F): 98.9 (25 May 2024 08:00), Max: 98.9 (25 May 2024 08:00)  HR: 75 (25 May 2024 08:00) (58 - 75)  BP: 169/47 (25 May 2024 08:00) (98/52 - 169/47)  BP(mean): --  ABP: --  ABP(mean): --  RR: 12 (25 May 2024 08:00) (12 - 22)  SpO2: 100% (25 May 2024 08:00) (100% - 100%)    O2 Parameters below as of 25 May 2024 08:00  Patient On (Oxygen Delivery Method): ventilator    O2 Concentration (%): 30      Mode: CPAP with PS, FiO2: 30, PEEP: 5, PS: 15    05-24 @ 07:01  -  05-25 @ 07:00  --------------------------------------------------------  IN: 1120 mL / OUT: 2400 mL / NET: -1280 mL      CAPILLARY BLOOD GLUCOSE      POCT Blood Glucose.: 148 mg/dL (25 May 2024 06:22)      PHYSICAL EXAM:  General: NAD   Neck: (+) Trach tube noted, site c/d/i.  Cards: S1/S2, no murmurs   Pulm: CTA bilaterally with some coarse inspiratory rhonchi. No resp distress.   Abdomen: Soft, NTND. (+) PEG noted, site c/d/i.   Extremities: No pedal edema. Extremities warm to touch. Intact distal pulses. RUE AVF.  Neurology: AOx3. 5/5 motor strength x4E. Follows commands.   Skin: warm to touch, color appropriate for ethnicity. Refer to RN assessment for further details.      Mode: CPAP with PS  FiO2: 30  PEEP: 5  PS: 15      HOSPITAL MEDICATIONS:  MEDICATIONS  (STANDING):  albuterol/ipratropium for Nebulization 3 milliLiter(s) Nebulizer every 12 hours  aspirin  chewable 81 milliGRAM(s) Oral daily  atorvastatin 20 milliGRAM(s) Oral at bedtime  carvedilol 12.5 milliGRAM(s) Oral every 12 hours  chlorhexidine 0.12% Liquid 15 milliLiter(s) Oral Mucosa every 12 hours  chlorhexidine 2% Cloths 1 Application(s) Topical <User Schedule>  dextrose 10% Bolus 125 milliLiter(s) IV Bolus once  dextrose 5%. 1000 milliLiter(s) (100 mL/Hr) IV Continuous <Continuous>  dextrose 5%. 1000 milliLiter(s) (50 mL/Hr) IV Continuous <Continuous>  dextrose 50% Injectable 25 Gram(s) IV Push once  dextrose 50% Injectable 12.5 Gram(s) IV Push once  epoetin reilly (EPOGEN) Injectable 57050 Unit(s) IV Push <User Schedule>  ferrous    sulfate Liquid 300 milliGRAM(s) Enteral Tube daily  glucagon  Injectable 1 milliGRAM(s) IntraMuscular once  heparin  Infusion. 900 Unit(s)/Hr (9 mL/Hr) IV Continuous <Continuous>  hydrALAZINE 100 milliGRAM(s) Oral three times a day  insulin lispro (ADMELOG) corrective regimen sliding scale   SubCutaneous every 6 hours  insulin NPH human recombinant 4 Unit(s) SubCutaneous every 6 hours  piperacillin/tazobactam IVPB.. 3.375 Gram(s) IV Intermittent every 12 hours  sodium bicarbonate 650 milliGRAM(s) Oral every 8 hours  sodium chloride 3%  Inhalation 4 milliLiter(s) Inhalation every 12 hours    MEDICATIONS  (PRN):  acetaminophen   Oral Liquid .. 650 milliGRAM(s) Oral every 6 hours PRN Temp greater or equal to 38C (100.4F), Moderate Pain (4 - 6)  dextrose Oral Gel 15 Gram(s) Oral once PRN Blood Glucose LESS THAN 70 milliGRAM(s)/deciliter  melatonin 3 milliGRAM(s) Oral at bedtime PRN Insomnia  sodium chloride 0.9% lock flush 10 milliLiter(s) IV Push every 1 hour PRN Pre/post blood products, medications, blood draw, and to maintain line patency      LABS:                        7.6    11.35 )-----------( 493      ( 25 May 2024 06:35 )             23.9     05-25    134<L>  |  90<L>  |  20  ----------------------------<  338<H>  3.7   |  23  |  3.16<H>    Ca    7.7<L>      25 May 2024 06:35  Phos  3.1     05-25  Mg     2.00     05-25      PT/INR - ( 24 May 2024 03:30 )   PT: 14.2 sec;   INR: 1.27 ratio         PTT - ( 25 May 2024 06:57 )  PTT:92.1 sec  Urinalysis Basic - ( 25 May 2024 06:35 )    Color: x / Appearance: x / SG: x / pH: x  Gluc: 338 mg/dL / Ketone: x  / Bili: x / Urobili: x   Blood: x / Protein: x / Nitrite: x   Leuk Esterase: x / RBC: x / WBC x   Sq Epi: x / Non Sq Epi: x / Bacteria: x         CHIEF COMPLAINT:Patient is a 73y old  Male who presents with a chief complaint of Unstable angina, abn EKG (24 May 2024 21:41)      INTERVAL EVENTS: no events. Tele reviewed, 3 beats NSVT noted.     ROS: Seen by bedside during AM rounds     OBJECTIVE:  ICU Vital Signs Last 24 Hrs  T(C): 37.2 (25 May 2024 08:00), Max: 37.2 (25 May 2024 08:00)  T(F): 98.9 (25 May 2024 08:00), Max: 98.9 (25 May 2024 08:00)  HR: 75 (25 May 2024 08:00) (58 - 75)  BP: 169/47 (25 May 2024 08:00) (98/52 - 169/47)  BP(mean): --  ABP: --  ABP(mean): --  RR: 12 (25 May 2024 08:00) (12 - 22)  SpO2: 100% (25 May 2024 08:00) (100% - 100%)    O2 Parameters below as of 25 May 2024 08:00  Patient On (Oxygen Delivery Method): ventilator    O2 Concentration (%): 30      Mode: CPAP with PS, FiO2: 30, PEEP: 5, PS: 15    05-24 @ 07:01  -  05-25 @ 07:00  --------------------------------------------------------  IN: 1120 mL / OUT: 2400 mL / NET: -1280 mL      CAPILLARY BLOOD GLUCOSE      POCT Blood Glucose.: 148 mg/dL (25 May 2024 06:22)      PHYSICAL EXAM:  General: NAD   Neck: (+) Trach tube noted, site c/d/i.  Cards: S1/S2, no murmurs   Pulm: CTA bilaterally with some coarse inspiratory rhonchi. No resp distress.   Abdomen: Soft, NTND. (+) PEG noted, site c/d/i.   Extremities: No pedal edema. Extremities warm to touch. Intact distal pulses. RUE AVF.  Neurology: AOx3. 5/5 motor strength x4E. Follows commands.   Skin: warm to touch, color appropriate for ethnicity. Refer to RN assessment for further details.      Mode: CPAP with PS  FiO2: 30  PEEP: 5  PS: 15      HOSPITAL MEDICATIONS:  MEDICATIONS  (STANDING):  albuterol/ipratropium for Nebulization 3 milliLiter(s) Nebulizer every 12 hours  aspirin  chewable 81 milliGRAM(s) Oral daily  atorvastatin 20 milliGRAM(s) Oral at bedtime  carvedilol 12.5 milliGRAM(s) Oral every 12 hours  chlorhexidine 0.12% Liquid 15 milliLiter(s) Oral Mucosa every 12 hours  chlorhexidine 2% Cloths 1 Application(s) Topical <User Schedule>  dextrose 10% Bolus 125 milliLiter(s) IV Bolus once  dextrose 5%. 1000 milliLiter(s) (100 mL/Hr) IV Continuous <Continuous>  dextrose 5%. 1000 milliLiter(s) (50 mL/Hr) IV Continuous <Continuous>  dextrose 50% Injectable 25 Gram(s) IV Push once  dextrose 50% Injectable 12.5 Gram(s) IV Push once  epoetin reilly (EPOGEN) Injectable 41451 Unit(s) IV Push <User Schedule>  ferrous    sulfate Liquid 300 milliGRAM(s) Enteral Tube daily  glucagon  Injectable 1 milliGRAM(s) IntraMuscular once  heparin  Infusion. 900 Unit(s)/Hr (9 mL/Hr) IV Continuous <Continuous>  hydrALAZINE 100 milliGRAM(s) Oral three times a day  insulin lispro (ADMELOG) corrective regimen sliding scale   SubCutaneous every 6 hours  insulin NPH human recombinant 4 Unit(s) SubCutaneous every 6 hours  piperacillin/tazobactam IVPB.. 3.375 Gram(s) IV Intermittent every 12 hours  sodium bicarbonate 650 milliGRAM(s) Oral every 8 hours  sodium chloride 3%  Inhalation 4 milliLiter(s) Inhalation every 12 hours    MEDICATIONS  (PRN):  acetaminophen   Oral Liquid .. 650 milliGRAM(s) Oral every 6 hours PRN Temp greater or equal to 38C (100.4F), Moderate Pain (4 - 6)  dextrose Oral Gel 15 Gram(s) Oral once PRN Blood Glucose LESS THAN 70 milliGRAM(s)/deciliter  melatonin 3 milliGRAM(s) Oral at bedtime PRN Insomnia  sodium chloride 0.9% lock flush 10 milliLiter(s) IV Push every 1 hour PRN Pre/post blood products, medications, blood draw, and to maintain line patency      LABS:                        7.6    11.35 )-----------( 493      ( 25 May 2024 06:35 )             23.9     05-25    134<L>  |  90<L>  |  20  ----------------------------<  338<H>  3.7   |  23  |  3.16<H>    Ca    7.7<L>      25 May 2024 06:35  Phos  3.1     05-25  Mg     2.00     05-25      PT/INR - ( 24 May 2024 03:30 )   PT: 14.2 sec;   INR: 1.27 ratio         PTT - ( 25 May 2024 06:57 )  PTT:92.1 sec  Urinalysis Basic - ( 25 May 2024 06:35 )    Color: x / Appearance: x / SG: x / pH: x  Gluc: 338 mg/dL / Ketone: x  / Bili: x / Urobili: x   Blood: x / Protein: x / Nitrite: x   Leuk Esterase: x / RBC: x / WBC x   Sq Epi: x / Non Sq Epi: x / Bacteria: x

## 2024-05-25 NOTE — CHART NOTE - NSCHARTNOTEFT_GEN_A_CORE
Notified by RN that patient with new fever 101F. Currently completing course of Zosyn for Bacillus bacteremia & PNA. Will reculture and c/w current abx for now. Low threshold to broaden if signs of HD instability or continued fever spikes.

## 2024-05-26 LAB
ALBUMIN SERPL ELPH-MCNC: 2.4 G/DL — LOW (ref 3.3–5)
ALP SERPL-CCNC: 104 U/L — SIGNIFICANT CHANGE UP (ref 40–120)
ALT FLD-CCNC: 16 U/L — SIGNIFICANT CHANGE UP (ref 4–41)
ANION GAP SERPL CALC-SCNC: 13 MMOL/L — SIGNIFICANT CHANGE UP (ref 7–14)
APTT BLD: 50.6 SEC — HIGH (ref 24.5–35.6)
APTT BLD: 71.2 SEC — HIGH (ref 24.5–35.6)
APTT BLD: 77 SEC — HIGH (ref 24.5–35.6)
AST SERPL-CCNC: 21 U/L — SIGNIFICANT CHANGE UP (ref 4–40)
BASOPHILS # BLD AUTO: 0.06 K/UL — SIGNIFICANT CHANGE UP (ref 0–0.2)
BASOPHILS NFR BLD AUTO: 0.5 % — SIGNIFICANT CHANGE UP (ref 0–2)
BILIRUB SERPL-MCNC: 0.2 MG/DL — SIGNIFICANT CHANGE UP (ref 0.2–1.2)
BLD GP AB SCN SERPL QL: NEGATIVE — SIGNIFICANT CHANGE UP
BUN SERPL-MCNC: 29 MG/DL — HIGH (ref 7–23)
CALCIUM SERPL-MCNC: 7.8 MG/DL — LOW (ref 8.4–10.5)
CHLORIDE SERPL-SCNC: 95 MMOL/L — LOW (ref 98–107)
CO2 SERPL-SCNC: 25 MMOL/L — SIGNIFICANT CHANGE UP (ref 22–31)
CREAT SERPL-MCNC: 4.48 MG/DL — HIGH (ref 0.5–1.3)
EGFR: 13 ML/MIN/1.73M2 — LOW
EOSINOPHIL # BLD AUTO: 0.3 K/UL — SIGNIFICANT CHANGE UP (ref 0–0.5)
EOSINOPHIL NFR BLD AUTO: 2.4 % — SIGNIFICANT CHANGE UP (ref 0–6)
GLUCOSE BLDC GLUCOMTR-MCNC: 191 MG/DL — HIGH (ref 70–99)
GLUCOSE BLDC GLUCOMTR-MCNC: 228 MG/DL — HIGH (ref 70–99)
GLUCOSE BLDC GLUCOMTR-MCNC: 230 MG/DL — HIGH (ref 70–99)
GLUCOSE BLDC GLUCOMTR-MCNC: 231 MG/DL — HIGH (ref 70–99)
GLUCOSE SERPL-MCNC: 202 MG/DL — HIGH (ref 70–99)
GRAM STN FLD: ABNORMAL
HCT VFR BLD CALC: 19.4 % — CRITICAL LOW (ref 39–50)
HCT VFR BLD CALC: 19.5 % — CRITICAL LOW (ref 39–50)
HCT VFR BLD CALC: 21.4 % — LOW (ref 39–50)
HCT VFR BLD CALC: 22.4 % — LOW (ref 39–50)
HGB BLD-MCNC: 6.2 G/DL — CRITICAL LOW (ref 13–17)
HGB BLD-MCNC: 6.3 G/DL — CRITICAL LOW (ref 13–17)
HGB BLD-MCNC: 7 G/DL — CRITICAL LOW (ref 13–17)
HGB BLD-MCNC: 7.1 G/DL — LOW (ref 13–17)
IANC: 10.42 K/UL — HIGH (ref 1.8–7.4)
IMM GRANULOCYTES NFR BLD AUTO: 1 % — HIGH (ref 0–0.9)
INR BLD: 1.4 RATIO — HIGH (ref 0.85–1.18)
LYMPHOCYTES # BLD AUTO: 0.91 K/UL — LOW (ref 1–3.3)
LYMPHOCYTES # BLD AUTO: 7.2 % — LOW (ref 13–44)
MAGNESIUM SERPL-MCNC: 2 MG/DL — SIGNIFICANT CHANGE UP (ref 1.6–2.6)
MCHC RBC-ENTMCNC: 20.8 PG — LOW (ref 27–34)
MCHC RBC-ENTMCNC: 20.9 PG — LOW (ref 27–34)
MCHC RBC-ENTMCNC: 20.9 PG — LOW (ref 27–34)
MCHC RBC-ENTMCNC: 21.1 PG — LOW (ref 27–34)
MCHC RBC-ENTMCNC: 31.7 GM/DL — LOW (ref 32–36)
MCHC RBC-ENTMCNC: 32 GM/DL — SIGNIFICANT CHANGE UP (ref 32–36)
MCHC RBC-ENTMCNC: 32.3 GM/DL — SIGNIFICANT CHANGE UP (ref 32–36)
MCHC RBC-ENTMCNC: 32.7 GM/DL — SIGNIFICANT CHANGE UP (ref 32–36)
MCV RBC AUTO: 64.7 FL — LOW (ref 80–100)
MCV RBC AUTO: 64.8 FL — LOW (ref 80–100)
MCV RBC AUTO: 65.3 FL — LOW (ref 80–100)
MCV RBC AUTO: 65.5 FL — LOW (ref 80–100)
MONOCYTES # BLD AUTO: 0.78 K/UL — SIGNIFICANT CHANGE UP (ref 0–0.9)
MONOCYTES NFR BLD AUTO: 6.2 % — SIGNIFICANT CHANGE UP (ref 2–14)
NEUTROPHILS # BLD AUTO: 10.42 K/UL — HIGH (ref 1.8–7.4)
NEUTROPHILS NFR BLD AUTO: 82.7 % — HIGH (ref 43–77)
NRBC # BLD: 0 /100 WBCS — SIGNIFICANT CHANGE UP (ref 0–0)
NRBC # FLD: 0.04 K/UL — HIGH (ref 0–0)
NRBC # FLD: 0.04 K/UL — HIGH (ref 0–0)
NRBC # FLD: 0.05 K/UL — HIGH (ref 0–0)
NRBC # FLD: 0.08 K/UL — HIGH (ref 0–0)
OB PNL STL: POSITIVE
PHOSPHATE SERPL-MCNC: 3.3 MG/DL — SIGNIFICANT CHANGE UP (ref 2.5–4.5)
PLATELET # BLD AUTO: 370 K/UL — SIGNIFICANT CHANGE UP (ref 150–400)
PLATELET # BLD AUTO: 372 K/UL — SIGNIFICANT CHANGE UP (ref 150–400)
PLATELET # BLD AUTO: 420 K/UL — HIGH (ref 150–400)
PLATELET # BLD AUTO: 447 K/UL — HIGH (ref 150–400)
POTASSIUM SERPL-MCNC: 3.6 MMOL/L — SIGNIFICANT CHANGE UP (ref 3.5–5.3)
POTASSIUM SERPL-SCNC: 3.6 MMOL/L — SIGNIFICANT CHANGE UP (ref 3.5–5.3)
PROT SERPL-MCNC: 5.6 G/DL — LOW (ref 6–8.3)
PROTHROM AB SERPL-ACNC: 15.5 SEC — HIGH (ref 9.5–13)
PTH-INTACT FLD-MCNC: 122 PG/ML — HIGH (ref 15–65)
RBC # BLD: 2.97 M/UL — LOW (ref 4.2–5.8)
RBC # BLD: 3.01 M/UL — LOW (ref 4.2–5.8)
RBC # BLD: 3.31 M/UL — LOW (ref 4.2–5.8)
RBC # BLD: 3.42 M/UL — LOW (ref 4.2–5.8)
RBC # FLD: 21.2 % — HIGH (ref 10.3–14.5)
RBC # FLD: 21.3 % — HIGH (ref 10.3–14.5)
RBC # FLD: 21.5 % — HIGH (ref 10.3–14.5)
RBC # FLD: 22 % — HIGH (ref 10.3–14.5)
RH IG SCN BLD-IMP: POSITIVE — SIGNIFICANT CHANGE UP
SODIUM SERPL-SCNC: 133 MMOL/L — LOW (ref 135–145)
SPECIMEN SOURCE: SIGNIFICANT CHANGE UP
WBC # BLD: 12.15 K/UL — HIGH (ref 3.8–10.5)
WBC # BLD: 12.6 K/UL — HIGH (ref 3.8–10.5)
WBC # BLD: 8.65 K/UL — SIGNIFICANT CHANGE UP (ref 3.8–10.5)
WBC # BLD: 9.28 K/UL — SIGNIFICANT CHANGE UP (ref 3.8–10.5)
WBC # FLD AUTO: 12.15 K/UL — HIGH (ref 3.8–10.5)
WBC # FLD AUTO: 12.6 K/UL — HIGH (ref 3.8–10.5)
WBC # FLD AUTO: 8.65 K/UL — SIGNIFICANT CHANGE UP (ref 3.8–10.5)
WBC # FLD AUTO: 9.28 K/UL — SIGNIFICANT CHANGE UP (ref 3.8–10.5)

## 2024-05-26 PROCEDURE — 99233 SBSQ HOSP IP/OBS HIGH 50: CPT

## 2024-05-26 RX ORDER — PANTOPRAZOLE SODIUM 20 MG/1
80 TABLET, DELAYED RELEASE ORAL ONCE
Refills: 0 | Status: COMPLETED | OUTPATIENT
Start: 2024-05-26 | End: 2024-05-26

## 2024-05-26 RX ORDER — ACETAMINOPHEN 500 MG
1000 TABLET ORAL ONCE
Refills: 0 | Status: COMPLETED | OUTPATIENT
Start: 2024-05-26 | End: 2024-05-26

## 2024-05-26 RX ORDER — PIPERACILLIN AND TAZOBACTAM 4; .5 G/20ML; G/20ML
3.38 INJECTION, POWDER, LYOPHILIZED, FOR SOLUTION INTRAVENOUS EVERY 12 HOURS
Refills: 0 | Status: DISCONTINUED | OUTPATIENT
Start: 2024-05-27 | End: 2024-05-28

## 2024-05-26 RX ORDER — PANTOPRAZOLE SODIUM 20 MG/1
40 TABLET, DELAYED RELEASE ORAL EVERY 12 HOURS
Refills: 0 | Status: DISCONTINUED | OUTPATIENT
Start: 2024-05-27 | End: 2024-06-14

## 2024-05-26 RX ADMIN — Medication 100 MILLIGRAM(S): at 19:09

## 2024-05-26 RX ADMIN — SODIUM CHLORIDE 4 MILLILITER(S): 9 INJECTION INTRAMUSCULAR; INTRAVENOUS; SUBCUTANEOUS at 06:30

## 2024-05-26 RX ADMIN — HUMAN INSULIN 4 UNIT(S): 100 INJECTION, SUSPENSION SUBCUTANEOUS at 05:23

## 2024-05-26 RX ADMIN — ATORVASTATIN CALCIUM 20 MILLIGRAM(S): 80 TABLET, FILM COATED ORAL at 21:06

## 2024-05-26 RX ADMIN — CARVEDILOL PHOSPHATE 12.5 MILLIGRAM(S): 80 CAPSULE, EXTENDED RELEASE ORAL at 19:09

## 2024-05-26 RX ADMIN — HUMAN INSULIN 4 UNIT(S): 100 INJECTION, SUSPENSION SUBCUTANEOUS at 18:12

## 2024-05-26 RX ADMIN — Medication 300 MILLIGRAM(S): at 12:16

## 2024-05-26 RX ADMIN — CARVEDILOL PHOSPHATE 12.5 MILLIGRAM(S): 80 CAPSULE, EXTENDED RELEASE ORAL at 05:53

## 2024-05-26 RX ADMIN — Medication 650 MILLIGRAM(S): at 21:06

## 2024-05-26 RX ADMIN — Medication 3 MILLILITER(S): at 06:30

## 2024-05-26 RX ADMIN — Medication 650 MILLIGRAM(S): at 12:16

## 2024-05-26 RX ADMIN — Medication 4: at 05:23

## 2024-05-26 RX ADMIN — Medication 4: at 18:11

## 2024-05-26 RX ADMIN — SODIUM CHLORIDE 4 MILLILITER(S): 9 INJECTION INTRAMUSCULAR; INTRAVENOUS; SUBCUTANEOUS at 21:46

## 2024-05-26 RX ADMIN — CHLORHEXIDINE GLUCONATE 15 MILLILITER(S): 213 SOLUTION TOPICAL at 05:22

## 2024-05-26 RX ADMIN — Medication 100 MILLIGRAM(S): at 12:15

## 2024-05-26 RX ADMIN — Medication 81 MILLIGRAM(S): at 12:17

## 2024-05-26 RX ADMIN — PIPERACILLIN AND TAZOBACTAM 25 GRAM(S): 4; .5 INJECTION, POWDER, LYOPHILIZED, FOR SOLUTION INTRAVENOUS at 17:07

## 2024-05-26 RX ADMIN — PANTOPRAZOLE SODIUM 80 MILLIGRAM(S): 20 TABLET, DELAYED RELEASE ORAL at 20:49

## 2024-05-26 RX ADMIN — Medication 650 MILLIGRAM(S): at 05:22

## 2024-05-26 RX ADMIN — CHLORHEXIDINE GLUCONATE 1 APPLICATION(S): 213 SOLUTION TOPICAL at 05:23

## 2024-05-26 RX ADMIN — HEPARIN SODIUM 800 UNIT(S)/HR: 5000 INJECTION INTRAVENOUS; SUBCUTANEOUS at 11:53

## 2024-05-26 RX ADMIN — PIPERACILLIN AND TAZOBACTAM 25 GRAM(S): 4; .5 INJECTION, POWDER, LYOPHILIZED, FOR SOLUTION INTRAVENOUS at 05:22

## 2024-05-26 RX ADMIN — Medication 100 MILLIGRAM(S): at 03:48

## 2024-05-26 RX ADMIN — Medication 1000 MILLIGRAM(S): at 06:24

## 2024-05-26 RX ADMIN — Medication 3 MILLILITER(S): at 21:45

## 2024-05-26 RX ADMIN — Medication 400 MILLIGRAM(S): at 05:53

## 2024-05-26 NOTE — PROGRESS NOTE ADULT - ASSESSMENT
72 y/o M PMhx HTN, ESRD (on HD M/W/F), DM who presented with hiccups x 2 days and chest pain found to have peaked T waves. Hospital course c/b AMS s/p RRT on 5/1 and 5/2. Vascular consulted 2/2 RUE AVF access unable to be used s/p R femoral shiley placed for emergent HD. Transferred to MICU and intubated 5/2 for airway protection.   received empiric course of zosyn x 5 days, completed 5/7 and meropenem x 5 days, completed 5/14  B. cereus bacteremia, s/p vanc, completed 5/20    fever  restarted on zosyn  abd non-tender  pt denies  symptoms  possible RLL consolidation on CXR    AMS, CVA  TTE- no evidence of valvular disease  s/p LP 5/2, viral encephalitis/ meningitis ruled out  MRI- Punctate foci of restricted diffusion in the left talya and left cerebellum with associated T2 prolongation consistent with acute infarcts  s/p trach 5/14, s/p PEG 5/17    DVT  CT- Nonocclusive R common iliac vein deep venous thrombosis.    Recommendations  c/w zosyn  f/u blood cultures  check deep tracheal aspirate  trend temps, wbc    Steven Torres M.D.  [facility], Division of Infectious Diseases  [phone]  After 5pm on weekdays and all day on weekends - please call [phone]

## 2024-05-26 NOTE — PROGRESS NOTE ADULT - SUBJECTIVE AND OBJECTIVE BOX
[facility], Division of Infectious Diseases  AUGUST Carbajal Y. Patel, S. Shah, G. Casimir  [phone]  ([phone] - weekdays after 5pm and weekends)    Name: JIMMY BLAND  Age/Gender: 73y Male  MRN: [id number]    Interval History:  Notes reviewed.   febrile yesterday evening     Allergies: No Known Allergies      Objective:  Vitals:   T(F): 100 (05-26-24 @ 04:00), Max: 101 (05-25-24 @ 18:25)  HR: 66 (05-26-24 @ 06:30) (63 - 80)  BP: 191/50 (05-26-24 @ 04:00) (139/49 - 191/50)  RR: 20 (05-26-24 @ 04:00) (12 - 20)  SpO2: 99% (05-26-24 @ 06:30) (99% - 100%)  Physical Examination:  General: no acute distress  HEENT: anicteric, tracheostomy  Lungs: clear to auscultation anteriorly  Heart: S1, S2, normal rate, RIJ CVC  Abdomen: Soft. ND. NT  Extremities: No LE edema.   Skin: Warm. Dry.     Laboratory Studies:  CBC:                       7.0    12.60 )-----------( 447      ( 26 May 2024 01:20 )             21.4     WBC Trend:  12.60 05-26-24 @ 01:20  12.54 05-25-24 @ 18:45  11.35 05-25-24 @ 06:35  11.93 05-24-24 @ 03:30  11.06 05-23-24 @ 00:15  11.06 05-22-24 @ 07:25  9.71 05-22-24 @ 05:30  10.27 05-21-24 @ 05:25  9.06 05-20-24 @ 06:31    CMP: 05-26    133<L>  |  95<L>  |  29<H>  ----------------------------<  202<H>  3.6   |  25  |  4.48<H>    Ca    7.8<L>      26 May 2024 01:20  Phos  3.3     05-26  Mg     2.00     05-26    TPro  5.6<L>  /  Alb  2.4<L>  /  TBili  0.2  /  DBili  x   /  AST  21  /  ALT  16  /  AlkPhos  104  05-26      LIVER FUNCTIONS - ( 26 May 2024 01:20 )  Alb: 2.4 g/dL / Pro: 5.6 g/dL / ALK PHOS: 104 U/L / ALT: 16 U/L / AST: 21 U/L / GGT: x             Urinalysis Basic - ( 26 May 2024 01:20 )    Color: x / Appearance: x / SG: x / pH: x  Gluc: 202 mg/dL / Ketone: x  / Bili: x / Urobili: x   Blood: x / Protein: x / Nitrite: x   Leuk Esterase: x / RBC: x / WBC x   Sq Epi: x / Non Sq Epi: x / Bacteria: x      Microbiology: reviewed     Culture - Sputum (collected 05-18-24 @ 04:30)  Source: .Sputum Sputum  Gram Stain (05-18-24 @ 21:05):    Few polymorphonuclear leukocytes per low power field    No Squamous epithelial cells per low power field    No organisms seen per oil power field  Final Report (05-20-24 @ 07:02):    Normal Respiratory Jocelyn present    Culture - Blood (collected 05-18-24 @ 04:05)  Source: .Blood Blood-Venous  Final Report (05-23-24 @ 11:00):    No growth at 5 days    Culture - Blood (collected 05-18-24 @ 03:45)  Source: .Blood Blood-Venous  Final Report (05-23-24 @ 09:00):    No growth at 5 days    Culture - Fungal, Bronchial (collected 05-12-24 @ 15:06)  Source: .Bronchial Bronchial Lavage  Preliminary Report (05-22-24 @ 23:02):    No fungus isolated at 1 week.    Culture - Acid Fast - Bronchial w/Smear (collected 05-12-24 @ 15:06)  Source: .Bronchial Bronchial Lavage  Preliminary Report (05-22-24 @ 23:08):    No acid-fast bacilli isolated at 1 week. ****Acid-fast cultures are held    for 6 weeks.****    Culture - Bronchial (collected 05-12-24 @ 15:06)  Source: .Bronchial Bronchial Lavage  Gram Stain (05-12-24 @ 22:40):    Moderate polymorphonuclear leukocytes per low power field    Rare Squamous epithelial cells per low power field    Rare Gram Positive Cocci in Clusters per oil power field  Final Report (05-14-24 @ 08:10):    Normal Respiratory Jocelyn present        Radiology: reviewed     Medications:  acetaminophen   Oral Liquid .. 650 milliGRAM(s) Oral every 6 hours PRN  albuterol/ipratropium for Nebulization 3 milliLiter(s) Nebulizer every 12 hours  aspirin  chewable 81 milliGRAM(s) Oral daily  atorvastatin 20 milliGRAM(s) Oral at bedtime  carvedilol 12.5 milliGRAM(s) Oral every 12 hours  chlorhexidine 0.12% Liquid 15 milliLiter(s) Oral Mucosa every 12 hours  chlorhexidine 2% Cloths 1 Application(s) Topical <User Schedule>  dextrose 10% Bolus 125 milliLiter(s) IV Bolus once  dextrose 5%. 1000 milliLiter(s) IV Continuous <Continuous>  dextrose 5%. 1000 milliLiter(s) IV Continuous <Continuous>  dextrose 50% Injectable 25 Gram(s) IV Push once  dextrose 50% Injectable 12.5 Gram(s) IV Push once  dextrose Oral Gel 15 Gram(s) Oral once PRN  epoetin reilly (EPOGEN) Injectable 72306 Unit(s) IV Push <User Schedule>  ferrous    sulfate Liquid 300 milliGRAM(s) Enteral Tube daily  glucagon  Injectable 1 milliGRAM(s) IntraMuscular once  heparin  Infusion. 800 Unit(s)/Hr IV Continuous <Continuous>  hydrALAZINE 100 milliGRAM(s) Oral three times a day  insulin lispro (ADMELOG) corrective regimen sliding scale   SubCutaneous every 6 hours  insulin NPH human recombinant 4 Unit(s) SubCutaneous every 6 hours  melatonin 3 milliGRAM(s) Oral at bedtime PRN  piperacillin/tazobactam IVPB.. 3.375 Gram(s) IV Intermittent every 12 hours  sodium bicarbonate 650 milliGRAM(s) Oral every 8 hours  sodium chloride 0.9% lock flush 10 milliLiter(s) IV Push every 1 hour PRN  sodium chloride 3%  Inhalation 4 milliLiter(s) Inhalation every 12 hours    Antimicrobials:  piperacillin/tazobactam IVPB.. 3.375 Gram(s) IV Intermittent every 12 hours

## 2024-05-26 NOTE — PROGRESS NOTE ADULT - ASSESSMENT
74 YO M with PMHx of ESRD on HD MWF, HTN, and 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 Baclofen started and course complicated by AMS second to baclofen toxicity requiring intubation and MICU transfer. Course further complicated by LEFT talya and cerebellum stroke, R AVF stenosis, aspiration and B Cereus bacteremia and RLE DVT. s/p trach and transferred to RCU 5/16    NEUROLOGY  # AMS   - MRI HEAD (5/6) with punctate foci in the left talya and left cerebellum with concern for acute infarct.   - MRA HEAD and NECK (5/6) with no large vessel occlusion or stenosis.  - Continue on aspirin and hold DAPT for now pending procedures   - Continue on Lipitor for medical management   - Supportive care     CARDIOVASCULAR  # CP with NSTEMI vs demand ischemia with HTN   - Admitted for CP and HTN with peaked T WAVES and ECG with ST deviation on admission   - Troponins elevated on admission with negative CKMB   - TTE (4/30) with EF 72, normal LVRVSF, and no regional wall motion abnormalities   - Case discussed with cardiology and no acute need for cath. DAPT loaded on 5/4 and plan for medical management with ASA, lipitor and heparin GTT.     # Septic vs vasoplegic shock  # Hx of HTN   - Home BP medications held and pressors weaned off   - Continue on coreg 12.5 and hydralazine 100 TID   - Monitor BP with goal 150/90s    RESPIRATORY  # Respiratory failure   - AMS noted with baclofen toxicity requiring intubation   - Attempted extubation in MICU however course complicated by aspiration and prolonged course and now s/p tracheostomy with IP on 5/14  - Continue on vent 12/500/5/30 and able to tolerate SBT 10/5 x several hours but limited by weakness   - Continue on nebs and HTS Q12H for now     GI  # Dysphagia   - s/p surgical PEG 5/17   - Continue on tube feeds via PEG     RENAL  # ESRD on HD MWF with R BCF  # Fistula stenosis ?  - VA AVF (5/2) with Right radiocephalic arteriovenous fistula has no hemodynamically significant stenosis present at the anastomotic site ( cm/sec, EDV 49 cm/sec). The usable segment is partially thrombosed with minimal patency.  - Required R FEMORAL line on medicine, then removed on 5/2, and replaced with R IJ SHILEY on 5/3 for CRRT then converted back to iHD MWF   - R SHILEY removed on 5/10 second to bacteremia and replaced on 5/13   - Continue on HD MWF per HD   - Continue on Renvela 1600   - Continue on HCO3 650 tabs   - Plan for Fistulogram still pending    INFECTIOUS DISEASE  # Aspiration   - Recurrent fever spike and CXR with RLL opacity   - BCx and SCx negative, however fever spikes improved on ABX   - Continue on Zosyn empirically (5/18 - 5/27) x 10 day course     # B Cereus Bacteremia   - Course complicated by fevers and aspiration event   - MRSA (5/1) negative   - BCx (5/2) negative   - SCx (5/4) and BAL (5/12) negative   - BCx (5/10) with bacillus cereus and cleared on (5/12).   - Case discussed with ID and s/p Vancomycin through 5/21 x 10 day course.     HEME  # AOCD with ESRD   - Anemia panel with AOCD and low # sat   - Transfused on 5/16   - EPO 10K continued on TIW   - Ferrous supplement added   - Monitor HH     VASCULAR   # RLE DVT   - CT AP (5/12) with nonocclusive RIGHT common iliac vein deep venous thrombosis  - Continue on heparin GTT and will hold on call to OR when planned  - RLE precautions     ENDOCRINE  # IDDM2 A1C 6.9  - Continue on NPH 4U q 6 and ISS   - Monitor and adjust insulin based on FS     SKIN  - R FEMORAL (5/1-5/2)   - R IJ SHILEY (5/3-5/10)   - R IJ SHILEY (5/13 - )     ETHICS/ GOC    - FULL CODE     DISPO - Vent facility    74 YO M with PMHx of ESRD on HD MWF, HTN, and 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 Baclofen started and course complicated by AMS second to baclofen toxicity requiring intubation and MICU transfer. Course further complicated by LEFT pontine and cerebellar stroke, R AVF stenosis, aspiration and B Cereus bacteremia and RLE DVT. s/p trach and transferred to RCU 5/16    NEUROLOGY  # AMS   - MRI HEAD (5/6) with punctate foci in the left talya and left cerebellum with concern for acute infarct.   - MRA HEAD and NECK (5/6) with no large vessel occlusion or stenosis.  - Continue on aspirin and hold DAPT for now pending procedures   - Continue on Lipitor for medical management   - Supportive care     CARDIOVASCULAR  # CP with NSTEMI vs demand ischemia with HTN   - Admitted for CP and HTN with peaked T WAVES and ECG with ST deviation on admission   - Troponins elevated on admission with negative CKMB   - TTE (4/30) with EF 72, normal LVRVSF, and no regional wall motion abnormalities   - Case discussed with cardiology and no acute need for cath. DAPT loaded on 5/4 and plan for medical management with ASA, lipitor and heparin GTT.     # Septic vs vasoplegic shock  # Hx of HTN   - Home BP medications held and pressors weaned off   - Continue on coreg 12.5 and hydralazine 100 TID   - Monitor BP with goal 150/90s    RESPIRATORY  # Respiratory failure   - AMS noted with baclofen toxicity requiring intubation   - Attempted extubation in MICU however course complicated by aspiration and prolonged course and now s/p tracheostomy with IP on 5/14  - Continue on vent 12/500/5/30 and able to tolerate SBT 10/5 x several hours but limited by weakness   - Continue on nebs and HTS Q12H for now     GI  # Dysphagia   - s/p surgical PEG 5/17   - Continue on tube feeds via PEG     RENAL  # ESRD on HD MWF with R BCF  # Fistula stenosis ?  - VA AVF (5/2) with Right radiocephalic arteriovenous fistula has no hemodynamically significant stenosis present at the anastomotic site ( cm/sec, EDV 49 cm/sec). The usable segment is partially thrombosed with minimal patency.  - Required R FEMORAL line on medicine, then removed on 5/2, and replaced with R IJ SHILEY on 5/3 for CRRT then converted back to iHD MWF   - R SHILEY removed on 5/10 second to bacteremia and replaced on 5/13   - Continue on HD MWF per Renal   - Continue on Renvela 1600   - Continue on HCO3 650 tabs   - Plan for Fistulogram still pending    INFECTIOUS DISEASE  # Aspiration   - Recurrent fever spike and CXR with RLL opacity   - BCx and SCx negative, however fever spikes improved on ABX   - Initially planned for Zosyn empirically (5/18 - 5/27) x 10 day course however given new fever with new L-sided haziness poss layering PLEF (5/25) will extend course (5/18 - )   - F/u RPT BCx, SCx sent    # B Cereus Bacteremia   - Course complicated by fevers and aspiration event   - MRSA (5/1) negative   - BCx (5/2) negative   - SCx (5/4) and BAL (5/12) negative   - BCx (5/10) with bacillus cereus and cleared on (5/12).   - Case discussed with ID and s/p Vancomycin through 5/21 x 10 day course.     HEME  # AOCD with ESRD   - Anemia panel with AOCD and low # sat   - Transfused on 5/16   - EPO 10K continued on TIW   - Ferrous supplement added   - Monitor HH     VASCULAR   # RLE DVT   - CT AP (5/12) with nonocclusive RIGHT common iliac vein deep venous thrombosis  - Continue on heparin GTT pending fistulogram   - RLE precautions     ENDOCRINE  # IDDM2 A1C 6.9  - Continue on NPH 4U q 6 and ISS   - Monitor and adjust insulin based on FS     SKIN  - R FEMORAL (5/1-5/2)   - R IJ SHILEY (5/3-5/10)   - R IJ SHILEY (5/13 - )     ETHICS/ GOC    - FULL CODE     DISPO - Vent facility

## 2024-05-26 NOTE — PROGRESS NOTE ADULT - SUBJECTIVE AND OBJECTIVE BOX
Patient is a 73y Male     Patient is a 73y old  Male who presents with a chief complaint of Unstable angina, abn EKG (26 May 2024 08:56)      HPI:  73-year-old male past medical history hypertension, ESRD on dialysis Monday Wednesday Friday, diabetes insulin-dependent presents with hiccups patient endorses persistent hiccups for the last 2 days. found to have peaked T waves, a new finding. denies dizziness, N/V, denies CP, palps, abd pain (29 Apr 2024 21:15)      PAST MEDICAL & SURGICAL HISTORY:  Diabetes      Benign essential HTN      HLD (hyperlipidemia)      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      ESRD on hemodialysis      Arteriovenous fistula          MEDICATIONS  (STANDING):  albuterol/ipratropium for Nebulization 3 milliLiter(s) Nebulizer every 12 hours  aspirin  chewable 81 milliGRAM(s) Oral daily  atorvastatin 20 milliGRAM(s) Oral at bedtime  carvedilol 12.5 milliGRAM(s) Oral every 12 hours  chlorhexidine 0.12% Liquid 15 milliLiter(s) Oral Mucosa every 12 hours  chlorhexidine 2% Cloths 1 Application(s) Topical <User Schedule>  dextrose 10% Bolus 125 milliLiter(s) IV Bolus once  dextrose 5%. 1000 milliLiter(s) (50 mL/Hr) IV Continuous <Continuous>  dextrose 5%. 1000 milliLiter(s) (100 mL/Hr) IV Continuous <Continuous>  dextrose 50% Injectable 12.5 Gram(s) IV Push once  dextrose 50% Injectable 25 Gram(s) IV Push once  epoetin reilly (EPOGEN) Injectable 10398 Unit(s) IV Push <User Schedule>  ferrous    sulfate Liquid 300 milliGRAM(s) Enteral Tube daily  glucagon  Injectable 1 milliGRAM(s) IntraMuscular once  heparin  Infusion. 800 Unit(s)/Hr (8 mL/Hr) IV Continuous <Continuous>  hydrALAZINE 100 milliGRAM(s) Oral three times a day  insulin lispro (ADMELOG) corrective regimen sliding scale   SubCutaneous every 6 hours  insulin NPH human recombinant 4 Unit(s) SubCutaneous every 6 hours  piperacillin/tazobactam IVPB.. 3.375 Gram(s) IV Intermittent every 12 hours  sodium bicarbonate 650 milliGRAM(s) Oral every 8 hours  sodium chloride 3%  Inhalation 4 milliLiter(s) Inhalation every 12 hours      Allergies    No Known Allergies    Intolerances        SOCIAL HISTORY:  Denies ETOh,Smoking,     FAMILY HISTORY:  FHx: diabetes mellitus (Father, Aunt)        REVIEW OF SYSTEMS:    CONSTITUTIONAL: No weakness, fevers or chills  EYES/ENT: No visual changes;  No vertigo or throat pain   NECK: No pain or stiffness  RESPIRATORY: No cough, wheezing, hemoptysis; No shortness of breath  CARDIOVASCULAR: No chest pain or palpitations  GASTROINTESTINAL: No abdominal or epigastric pain. No nausea, vomiting, or hematemesis; No diarrhea or constipation. No melena or hematochezia.  GENITOURINARY: No dysuria, frequency or hematuria  NEUROLOGICAL: No numbness or weakness  SKIN: No itching, burning, rashes, or lesions   All other review of systems is negative unless indicated above.    VITAL:  T(C): , Max: 38.3 (05-25-24 @ 18:25)  T(F): , Max: 101 (05-25-24 @ 18:25)  HR: 66 (05-26-24 @ 06:30)  BP: 191/50 (05-26-24 @ 04:00)  BP(mean): --  RR: 20 (05-26-24 @ 04:00)  SpO2: 99% (05-26-24 @ 06:30)  Wt(kg): --    I and O's:    05-24 @ 07:01  -  05-25 @ 07:00  --------------------------------------------------------  IN: 1120 mL / OUT: 2400 mL / NET: -1280 mL    05-25 @ 07:01  -  05-26 @ 07:00  --------------------------------------------------------  IN: 540 mL / OUT: 0 mL / NET: 540 mL          PHYSICAL EXAM:    Constitutional: NAD  HEENT: PERRLA,   Neck: No JVD  Respiratory: CTA B/L  Cardiovascular: S1 and S2  Gastrointestinal: BS+, soft, NT/ND  Extremities: No peripheral edema  Neurological: A/O x 3, no focal deficits  Psychiatric: Normal mood, normal affect  : No Abbasi  Skin: No rashes  Access: Not applicable  Back: No CVA tenderness    LABS:                        7.1    12.15 )-----------( 420      ( 26 May 2024 08:38 )             22.4     05-26    133<L>  |  95<L>  |  29<H>  ----------------------------<  202<H>  3.6   |  25  |  4.48<H>    Ca    7.8<L>      26 May 2024 01:20  Phos  3.3     05-26  Mg     2.00     05-26    TPro  5.6<L>  /  Alb  2.4<L>  /  TBili  0.2  /  DBili  x   /  AST  21  /  ALT  16  /  AlkPhos  104  05-26          RADIOLOGY & ADDITIONAL STUDIES:

## 2024-05-26 NOTE — PROGRESS NOTE ADULT - ASSESSMENT
73M w/ PMHx hypertension, ESRD on HD via R radiocephalic fistula (MWF), DM, HTN, p/w hiccups and peaked T waves, admitted to MICU for c/f baclofen toxicity. Patient received 1x HD on admission. R femoral shiley removed 5/2, had 2 RRT for AMS and failed HD via AVF 5/3. S/p emergent R IJ shiley placement 5/3, started CRRT which is now transitioned back to iHD. Vascular planning RUE fistulogram when medically optimized. Extubated to HFNC then reintubated 5/12 for c/f aspiration w/ hypoxic resp failure. S/p trach 5/14. Downgraded from MICU to RCU 5/16.    Recommendations:   - Planned fistulogram 5/26 cancelled for fever of unknown origin 5/25 - 5/26 overnight.   - Recommend f/u fever work up and will proceed with surgery when the patient is once again medically optimized and re-risk stratified after fever source is worked up/resolved  - hep gtt for nonocclusive R common iliac DVT  - Continue HD via replaced R IJ shiley  - Rest of care per primary  Vascular Surgery  [id number]   73M w/ PMHx hypertension, ESRD on HD via R radiocephalic fistula (MWF), DM, HTN, p/w hiccups and peaked T waves, admitted to MICU for c/f baclofen toxicity. Patient received 1x HD on admission. R femoral shiley removed 5/2, had 2 RRT for AMS and failed HD via AVF 5/3. S/p emergent R IJ shiley placement 5/3, started CRRT which is now transitioned back to iHD. Vascular planning RUE fistulogram when medically optimized. Extubated to HFNC then reintubated 5/12 for c/f aspiration w/ hypoxic resp failure. S/p trach 5/14. Downgraded from MICU to RCU 5/16.    Recommendations:   - Planned fistulogram 5/26 cancelled for fever of unknown origin 5/25 - 5/26 overnight.   - Given inability to have fistulogram - ordered repeat duplex of AVF and vein mapping for possible re-intervention planning  - Recommend f/u fever work up and will proceed with surgery when the patient is once again medically optimized and re-risk stratified after fever source is worked up/resolved  - hep gtt for nonocclusive R common iliac DVT  - Continue HD via replaced R IJ shiley  - Rest of care per primary    Vascular Surgery  [id number]

## 2024-05-26 NOTE — PROGRESS NOTE ADULT - ASSESSMENT
73-year-old male past medical history hypertension, ESRD on dialysis Monday Wednesday Friday, diabetes insulin-dependent presents with hiccups patient endorses persistent hiccups for the last 2 days. Nephrology consulted for HD needs.    A/P  ESRD:  Center: [facility]  Nephrologist: Dr. Hardwick  Access: R AVF  MWF schedule  Vascular for fistulogram   s/p femoral shiley on 5/1, now removed  s/p placement of IJ shiley 5/3  Stopped CRRT   Restarted IHD  s/p HD 5/7 UF 1778; treatment terminated early due to hypotension   S/P MRA head/neck w/ gadolinium --> Received HD on 5/9 and 5/10.  5/10 Will need to dialyze 3 days consecutively--> plan for HD on 5/11 5/11 shiley removed due to bacteremia; did not receive HD.  Line holiday  5/12 - BUN worsened; K up trending.    5/13 Shiley placed.  5/17: s/p PEG placement.   5/21 IR consulted for shiley exchange; IR recommended to keep current shiley in place, with pending fistulogram   5/25 - Pt still did not go for fistulogram d/t scheduling issues.  Please exchange shiley that was placed on 5/13 or switch to PC. D/W team.  Continue HD MWF   Consent obtained and placed in ED chart  Renal diet  Monitor BMP  consider [facility] for rehab where his outpatient Nephro-Dr. Hardwick can follow him    Hyperkalemia/Hypokalemia  Improved w/ HD.  C/W HD schedule as above.  Lokelma 10gm PRN for K >5.3 on non-HD days  PhosNaK given 5/21 and again 5/23.  K stable.  Low K diet.  Monitor closely     HTN:  BP fluctuating. BP acceptable at this time  On carvedilol 12.5mg BID, hydralazine 100mg TID.  Currently off nifedipine --> consider restarting nifedipine @ 30mg if BP remains suboptimal.  UF w/ HD as BP permits.  Monitor BP.    Anemia:  Hgb low.  + iron deficiency.  Tsat 13% - on ferrous sulfate 300mg qd via PEG.  Will hold venofer for now in view of up trending leukocytosis.  SILVA w/ HD.  Tranfuse for Hgb <7.  Monitor Hb.    CKD-MBD  Check PTH  PO4 at goal.  Sevelamer 1600mg TID d/c'ed 5/21.  S/p PhosNaK x2 doses 5/23.  PO4 WNL.  Monitor Ca, PO4 daily    Hypocalcemia:  In setting of CKD vs. hypoalbuminemia.  Optimize albumin.  Corrected Ca WNL.  Replete as needed.  Monitor Ca.

## 2024-05-26 NOTE — PROGRESS NOTE ADULT - SUBJECTIVE AND OBJECTIVE BOX
[facility]   MD ELIANE BHAGAT MD RUORU WONG, PA CHEN QIAN NP    TEL:  OFFICE: [phone]      RENAL FOLLOW UP NOTE-- Date of Service ;; 05-26-24 @ 11:13  --------------------------------------------------------------------------------  HPI:  Pt seen and examined at bedside  Denies SOB, chest pain.         PAST HISTORY  --------------------------------------------------------------------------------  No significant changes to PMH, PSH, FHx, SHx, unless otherwise noted    ALLERGIES & MEDICATIONS  --------------------------------------------------------------------------------  Allergies    No Known Allergies    Intolerances      Standing Inpatient Medications  albuterol/ipratropium for Nebulization 3 milliLiter(s) Nebulizer every 12 hours  aspirin  chewable 81 milliGRAM(s) Oral daily  atorvastatin 20 milliGRAM(s) Oral at bedtime  carvedilol 12.5 milliGRAM(s) Oral every 12 hours  chlorhexidine 0.12% Liquid 15 milliLiter(s) Oral Mucosa every 12 hours  chlorhexidine 2% Cloths 1 Application(s) Topical <User Schedule>  dextrose 10% Bolus 125 milliLiter(s) IV Bolus once  dextrose 5%. 1000 milliLiter(s) IV Continuous <Continuous>  dextrose 5%. 1000 milliLiter(s) IV Continuous <Continuous>  dextrose 50% Injectable 25 Gram(s) IV Push once  dextrose 50% Injectable 12.5 Gram(s) IV Push once  epoetin reilly (EPOGEN) Injectable 78313 Unit(s) IV Push <User Schedule>  ferrous    sulfate Liquid 300 milliGRAM(s) Enteral Tube daily  glucagon  Injectable 1 milliGRAM(s) IntraMuscular once  heparin  Infusion. 800 Unit(s)/Hr IV Continuous <Continuous>  hydrALAZINE 100 milliGRAM(s) Oral three times a day  insulin lispro (ADMELOG) corrective regimen sliding scale   SubCutaneous every 6 hours  insulin NPH human recombinant 4 Unit(s) SubCutaneous every 6 hours  piperacillin/tazobactam IVPB.. 3.375 Gram(s) IV Intermittent every 12 hours  sodium bicarbonate 650 milliGRAM(s) Oral every 8 hours  sodium chloride 3%  Inhalation 4 milliLiter(s) Inhalation every 12 hours    PRN Inpatient Medications  acetaminophen   Oral Liquid .. 650 milliGRAM(s) Oral every 6 hours PRN  dextrose Oral Gel 15 Gram(s) Oral once PRN  melatonin 3 milliGRAM(s) Oral at bedtime PRN  sodium chloride 0.9% lock flush 10 milliLiter(s) IV Push every 1 hour PRN      REVIEW OF SYSTEMS  --------------------------------------------------------------------------------  General: no fever  CVS: no chest pain  RESP: no sob, no cough, trach to vent  ABD: no abdominal pain  : no dysuria,  MSK: no edema     VITALS/PHYSICAL EXAM  --------------------------------------------------------------------------------  T(C): 37.8 (05-26-24 @ 04:00), Max: 38.3 (05-25-24 @ 18:25)  HR: 64 (05-26-24 @ 08:00) (63 - 80)  BP: 161/53 (05-26-24 @ 08:00) (139/49 - 191/50)  RR: 14 (05-26-24 @ 08:00) (14 - 20)  SpO2: 100% (05-26-24 @ 08:00) (99% - 100%)  Wt(kg): --        05-25-24 @ 07:01  -  05-26-24 @ 07:00  --------------------------------------------------------  IN: 540 mL / OUT: 0 mL / NET: 540 mL      Physical Exam:  	Gen: NAD  	HEENT: MMM  	Pulm: CTA B/L Trach to vent  	CV: S1S2  	Abd: Soft, +BS GT feed  	Ext: No LE edema B/L                      Neuro: Awake , alert  	Skin: Warm and Dry   	Vascular access: [facility]             LABS/STUDIES  --------------------------------------------------------------------------------              7.1    12.15 >-----------<  420      [05-26-24 @ 08:38]              22.4     133  |  95  |  29  ----------------------------<  202      [05-26-24 @ 01:20]  3.6   |  25  |  4.48        Ca     7.8     [05-26-24 @ 01:20]      Mg     2.00     [05-26-24 @ 01:20]      Phos  3.3     [05-26-24 @ 01:20]    TPro  5.6  /  Alb  2.4  /  TBili  0.2  /  DBili  x   /  AST  21  /  ALT  16  /  AlkPhos  104  [05-26-24 @ 01:20]    PT/INR: PT 15.5 , INR 1.40       [05-26-24 @ 01:20]  PTT: 71.2       [05-26-24 @ 08:38]      Creatinine Trend:  SCr 4.48 [05-26 @ 01:20]  SCr 3.16 [05-25 @ 06:35]  SCr 4.64 [05-24 @ 03:30]  SCr 3.29 [05-23 @ 00:15]  SCr 4.22 [05-22 @ 05:30]    Urinalysis - [05-26-24 @ 01:20]      Color  / Appearance  / SG  / pH       Gluc 202 / Ketone   / Bili  / Urobili        Blood  / Protein  / Leuk Est  / Nitrite       RBC  / WBC  / Hyaline  / Gran  / Sq Epi  / Non Sq Epi  / Bacteria       Iron 16, TIBC 121, %sat 13      [05-17-24 @ 06:00]  Ferritin 720      [05-17-24 @ 06:00]  PTH -- (Ca --)      [05-26-24 @ 01:20]   122  TSH 2.60      [05-17-24 @ 06:00]  Lipid: chol 86, , HDL 27, LDL --      [05-17-24 @ 06:00]    HBsAb <3.0      [05-01-24 @ 14:42]  HBcAb Nonreact      [05-01-24 @ 14:42]

## 2024-05-26 NOTE — PROGRESS NOTE ADULT - NS ATTEND AMEND GEN_ALL_CORE FT
Pt is a 73M w/ MHx ESRD on HD, HTN, and DMII initialled presenting to [facility] on 4/29/24 with chest pressure and hiccups admitted 2/2 concern for demand ischemia with hospital course c/b baclofen toxicity and acute ischemic CVA left talya/cerebellum c/b acute hypoxemic and hypercapnic respiratory failure s/p ETT intubation/MV with failure to wean from ventilator s/p tracheostomy. Hospital course further complicated by aspiration and B cereus bacteremia and RLE DVT. Now transferred to RCU 5/16 for further management.     Pt with acute encephalopathy while in hospital concerning for baclofen toxicity. Pt further with MRI Head (5/6) concerning for left talya and left cerebellum ischemic infarct with no vessel occlusion/stenosis on MRA. Continue on aspirin and hold DAPT for now pending procedures. Statin in place. Neurology recs appreciated. PT/OT consulted.    Pt with acute combined hypoxemic and hypercapnic respiratory failure s/p ETT intubation/MV with failure to wean from ventilator requiring tracheostomy placement (IP 5/14). SBTs as tolerated. ABG on current settings wnl. Trach care and suctioning as per RCU team. Oral hygiene and aspiration precautions in place. Wean O2 supplementation for goal O2 saturation 90-95%. Airway clearance therapy in place.     Pt initially admitted with demand ischemia versus NSTEMI. EKG concerning for ST deviation/t-wave peak with (+) troponin leak. TTE (4/30) without regional wall abnormalities. No indication for catheterization at this time. DAP initiated. Cardiology following and recs appreciated. Pt remains HD stable and clinically euvolemic. No events on telemetry. Cardiology following, recs appreciated.     Pt with oropharyngeal dysphagia s/p PEG placement (Surgery, 5/17). Pt tolerating feeds at goal rate. Pt with ESRD via right AVF found to have stenosis requiring temporary access. Right shiley removed (5/10) 2/2 bacteremia, replaced 5/13. Pt was planning for fistulogram with vascular sx however had fever overnight so case cancelled. Vascular sx recommends repeat duplex of AVF and vein mapping for possible re-intervention planning instead. Given fever, plan for line holiday after next HD session. HD as per nephrology team.     Hospital course further c/b aspiration PNA c/b B. cereus bacteremia (5/10) with clearance of cx 5/12, now s/p completion of Abx with Zosyn through 5/27 and Vancomycin through 5/21. Pt with fever overnight, repeat cx sent. Plan for HD with line holiday followed by IR catheter before next HD session.     Hospital course further c/b RLE DVT, on heparin drip. Held in preparation for OR as per Vascular sx team. Will need possible permacath.     Dispo pending medical optimization. Pt full code. DVT ppx heparin drip full A/C. PT consulted. Discussed with pt and wife at bedside today, will continue to update throughout hospitalization.

## 2024-05-26 NOTE — CONSULT NOTE ADULT - ASSESSMENT
-----------------------------------------------------------  Interventional Radiology Brief Consult Note  -----------------------------------------------------------    Reason for Referral: non tunneled HD catheter exchange     Clinical Summary: 73y Male with pmh of HTN, ESRD (on HD M/W/F with non functioning RUE AVF with plan for fistulogram with vascular), who presented with chest pain and peaked T waves initially. Hospital course was c/b by admission to MICU and intubation, bacteremia now s/p IV abx. Patient was also found to have acute DVT, now on heparin gtt. Patient had a right internal jugular non tunneled catheter placed for HD access on 5/3/24 that IR is consulted to exchange, given recent fever.    Vitals:  T(F): 100 (05-26-24 @ 04:00), Max: 101 (05-25-24 @ 18:25)  HR: 64 (05-26-24 @ 15:04) (60 - 80)  BP: 161/53 (05-26-24 @ 08:00) (139/49 - 191/50)  RR: 14 (05-26-24 @ 08:00) (14 - 20)  SpO2: 100% (05-26-24 @ 15:04) (99% - 100%)    Labs:           7.1  12.15)-----(420     (05-26-24 @ 08:38)         22.4     133 | 95 | 29  --------------------< 202     (05-26-24 @ 01:20)  3.6 | 25 | 4.48       PT: -- 05-26-24 @ 08:38  aPTT: 71.2<H> 05-26-24 @ 08:38   INR: -- 05-26-24 @ 08:38    Imaging: Xray chest 5/25/24 was reviewed     Assessment: 73y Male with pmh of ESRD requiring HD, currently via a right internal jugular vein non tunneled HD catheter. IR is consulted for non tunneled HD catheter exchange given recent fever.     Recommendations:  - Given recent fever, recommend another session of HD and pulling the non tunneled HD catheter after to give patient a line holiday with plan for future new non tunneled vs tunneled HD catheter placement.   - Appreciate ID and nephrology input.   - Please reach out to IR with final plans to coordinate for new HD catheter placement.     --  Ellie Vidal MD  Interventional Radiology Resident (PGY-5)  Available on [employer] TEAMS    For EMERGENT inquiries/questions:  IR Pager ([facility]): [phone]  IR Pager ([facility]): [phone] ; [phone]    For non-emergent consults/questions:   Please place a sunrise order "Consult- Interventional Radiology" with an appropriate callback number    For questions about scheduling during appropriate work hours, call IR :  [facility]: [phone]  [facility]: [phone]    For outpatient IR booking:  [facility]: [phone]  [facility]: [phone]

## 2024-05-26 NOTE — PROGRESS NOTE ADULT - ASSESSMENT
73-year-old male past medical history hypertension, ESRD on dialysis Monday Wednesday Friday, diabetes insulin-dependent presents with hiccups patient endorses persistent hiccups for the last 2 days.   found to have peaked T waves, a new finding. denied dizziness, N/V, denies CP, palps, abd pain  Upon admission seen by CArd, renal and GI  found to have a distended GB prob 2/2 gastroparesis, was started on Reglan  has received 4 doses of baclofen since 4/30 2/2 hiccups, last dose on 5/1 at 5 am  had received Haldol for agitation at 11 pm on 4/30, AMS observed in pm of 5/1  AMS ongoing, RRT x 2 called  now transferred to MICU for encephalopathy requiring  airway protection on 5/2,  intubated for airway protection, was on  propofol and pressors. now off  toxicology consulted upon dx of encephalopathy, not impressed AMS 2/2 Haldol nor baclofen . AMS was 2/2 acute CVA   HD was not done timely until femoral shiley was placed 2/2 clotted RUE AVF. now removed and RIJ shiley placed on 5/3  has been nonverbal since pm 5/1.     MR brain 5/6 c/w L pontine and L cerebellar acute infarcts, no hemorrhage . as per neuro: embolic in nature.   encephalopathy probably 2/2 acute CVA, now follows commands appropriately off sedation.   no SZ focus on EEG,   off CRRT,  HD resumed as per renal.   remains intubated, now trached  off keppra  AC resumed, on Heparin drip for R common iliac DVT  completed 5 days of empiric ZOSYN on 5/7, shortly after became septic again after an extubation trial. bacteremia w B. cereus, on Vanc through 5/20 as per ID    s/p trache placement by pulm,   IR unable to find a window for G-J tube. PEG placed by surgery 5/17, resume feeds when cleared by surgery  HD via R IJ.  Tmax overnight( 5/18)  101, cxr c/w RLL PNA, now on Zosyn, blood Cx s sent. remains on Vanco for B. cereus bacteremia   leukocytosis resolved  EKG changes on 5/3 c/w NSTEMI loaded w DAPT , was  on Heparin drip x 48 hrs. rpt TTE is unchanged  ID, Neuro , renal, cardiology following.  clotted RUE AVF.  fistulogram was cancelled on 5/25 2/2 fever  HD via RIght IJ Shiley.   LP done, no sign of meningitis.   NEUROLOGY     - CTH without acute findings   - LP done, no acute findings  - MR brain w acute infarcts: L talya and L cerebellum, nonhemorrhagic  - no Sz focus on EEG    CARDIOVASCULAR   - ptn never had CP. just peaked T waves. was awaiting ischemic study w nucl stress test  - TTE showing EF 72%, rpt unchanged  - EKG changes on 5/3, loaded w DAPT now on Heparin drip 2/2 DVT    GI  - PEG placed, npo w tube feeds  - Gastroparesis       RENAL   -  HD as per renal  - via R IJ shiley  -  fistula study of RUE  as per Vascular : 5/24  - permacath some time after fistulogram       INFECTIOUS DISEASE     completed a course of Zosyn, then became septic, bacteremic a B. cereus, completed 3 days of Meropenem, completed vanc through 5/21  on Zosyn since 5/18 for RLL PNA, afebrile, completed 5/23  recurrent fever 5/25, ZOsyn resumed    ENDOCRINE   - DM  - cw ISS    DVT  - RLE , on Heparin drip    GOC:  Full code

## 2024-05-26 NOTE — PROGRESS NOTE ADULT - SUBJECTIVE AND OBJECTIVE BOX
TEAM [ C ] Surgery Daily Progress Note  =====================================================    SUBJECTIVE: Patient seen and examined at bedside on AM rounds. Pt had fever overnight to 101.F, planned fistulogram cancelled as patient is no longer medically optimized for fistulogram pending fever workup.     PMH:   PAST MEDICAL & SURGICAL HISTORY:  Diabetes      Benign essential HTN      HLD (hyperlipidemia)      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      ESRD on hemodialysis      Arteriovenous fistula          ALLERGIES:  No Known Allergies      --------------------------------------------------------------------------------------    MEDICATIONS:    Neurologic Medications  acetaminophen   Oral Liquid .. 650 milliGRAM(s) Oral every 6 hours PRN Temp greater or equal to 38C (100.4F), Moderate Pain (4 - 6)  melatonin 3 milliGRAM(s) Oral at bedtime PRN Insomnia    Respiratory Medications  albuterol/ipratropium for Nebulization 3 milliLiter(s) Nebulizer every 12 hours  sodium chloride 3%  Inhalation 4 milliLiter(s) Inhalation every 12 hours    Cardiovascular Medications  carvedilol 12.5 milliGRAM(s) Oral every 12 hours  hydrALAZINE 100 milliGRAM(s) Oral three times a day    Gastrointestinal Medications  dextrose 10% Bolus 125 milliLiter(s) IV Bolus once  dextrose 5%. 1000 milliLiter(s) IV Continuous <Continuous>  dextrose 5%. 1000 milliLiter(s) IV Continuous <Continuous>  ferrous    sulfate Liquid 300 milliGRAM(s) Enteral Tube daily  sodium bicarbonate 650 milliGRAM(s) Oral every 8 hours  sodium chloride 0.9% lock flush 10 milliLiter(s) IV Push every 1 hour PRN Pre/post blood products, medications, blood draw, and to maintain line patency    Genitourinary Medications    Hematologic/Oncologic Medications  aspirin  chewable 81 milliGRAM(s) Oral daily  epoetin reilly (EPOGEN) Injectable 57221 Unit(s) IV Push <User Schedule>  heparin  Infusion. 800 Unit(s)/Hr IV Continuous <Continuous>    Antimicrobial/Immunologic Medications  piperacillin/tazobactam IVPB.. 3.375 Gram(s) IV Intermittent every 12 hours    Endocrine/Metabolic Medications  atorvastatin 20 milliGRAM(s) Oral at bedtime  dextrose 50% Injectable 12.5 Gram(s) IV Push once  dextrose 50% Injectable 25 Gram(s) IV Push once  dextrose Oral Gel 15 Gram(s) Oral once PRN Blood Glucose LESS THAN 70 milliGRAM(s)/deciliter  glucagon  Injectable 1 milliGRAM(s) IntraMuscular once  insulin lispro (ADMELOG) corrective regimen sliding scale   SubCutaneous every 6 hours  insulin NPH human recombinant 4 Unit(s) SubCutaneous every 6 hours    Topical/Other Medications  chlorhexidine 0.12% Liquid 15 milliLiter(s) Oral Mucosa every 12 hours  chlorhexidine 2% Cloths 1 Application(s) Topical <User Schedule>    --------------------------------------------------------------------------------------    VITAL SIGNS:    T(C): 37.8 (05-26-24 @ 04:00), Max: 38.3 (05-25-24 @ 18:25)  HR: 64 (05-26-24 @ 08:00) (63 - 80)  BP: 161/53 (05-26-24 @ 08:00) (139/49 - 191/50)  RR: 14 (05-26-24 @ 08:00) (14 - 20)  SpO2: 100% (05-26-24 @ 08:00) (99% - 100%)    --------------------------------------------------------------------------------------  EXAM    General: NAD, resting in bed comfortably   Cardiac: regular rate, warm and well perfused  Respiratory: Trached on ventilator  Extremities: RUE with palpable thrill    --------------------------------------------------------------------------------------  LABS                        7.1    12.15 )-----------( 420      ( 26 May 2024 08:38 )             22.4   05-26    133<L>  |  95<L>  |  29<H>  ----------------------------<  202<H>  3.6   |  25  |  4.48<H>    Ca    7.8<L>      26 May 2024 01:20  Phos  3.3     05-26  Mg     2.00     05-26    TPro  5.6<L>  /  Alb  2.4<L>  /  TBili  0.2  /  DBili  x   /  AST  21  /  ALT  16  /  AlkPhos  104  05-26  --------------------------------------------------------------------------------------    INS AND OUTS:    05-25-24 @ 07:01  -  05-26-24 @ 07:00  --------------------------------------------------------  IN: 540 mL / OUT: 0 mL / NET: 540 mL  --------------------------------------------------------------------------------------

## 2024-05-26 NOTE — PROGRESS NOTE ADULT - SUBJECTIVE AND OBJECTIVE BOX
Cardiovascular Disease Progress Note  DATE OF SERVICE: 05-26-24 @ 09:58    Overnight events: No acute events overnight.  The patient is in no distress on mechanical ventilation via trach.     Otherwise review of systems negative    Objective Findings:  T(C): 37.8 (05-26-24 @ 04:00), Max: 38.3 (05-25-24 @ 18:25)  HR: 66 (05-26-24 @ 06:30) (63 - 80)  BP: 191/50 (05-26-24 @ 04:00) (139/49 - 191/50)  RR: 20 (05-26-24 @ 04:00) (19 - 20)  SpO2: 99% (05-26-24 @ 06:30) (99% - 100%)  Wt(kg): --  Daily     Daily       Physical Exam:  Gen: NAD; Patient resting comfortably  HEENT: EOMI, Normocephalic/ atraumatic  CV: RRR, normal S1 + S2, no m/r/g  Lungs:  Normal respiratory effort; clear to auscultation bilaterally  Abd: soft, non-tender; bowel sounds present  Ext: No edema; warm and well perfused    Telemetry: n/a    Laboratory Data:                        7.1    12.15 )-----------( 420      ( 26 May 2024 08:38 )             22.4     05-26    133<L>  |  95<L>  |  29<H>  ----------------------------<  202<H>  3.6   |  25  |  4.48<H>    Ca    7.8<L>      26 May 2024 01:20  Phos  3.3     05-26  Mg     2.00     05-26    TPro  5.6<L>  /  Alb  2.4<L>  /  TBili  0.2  /  DBili  x   /  AST  21  /  ALT  16  /  AlkPhos  104  05-26    PT/INR - ( 26 May 2024 01:20 )   PT: 15.5 sec;   INR: 1.40 ratio         PTT - ( 26 May 2024 08:38 )  PTT:71.2 sec          Inpatient Medications:  MEDICATIONS  (STANDING):  albuterol/ipratropium for Nebulization 3 milliLiter(s) Nebulizer every 12 hours  aspirin  chewable 81 milliGRAM(s) Oral daily  atorvastatin 20 milliGRAM(s) Oral at bedtime  carvedilol 12.5 milliGRAM(s) Oral every 12 hours  chlorhexidine 0.12% Liquid 15 milliLiter(s) Oral Mucosa every 12 hours  chlorhexidine 2% Cloths 1 Application(s) Topical <User Schedule>  dextrose 10% Bolus 125 milliLiter(s) IV Bolus once  dextrose 5%. 1000 milliLiter(s) (50 mL/Hr) IV Continuous <Continuous>  dextrose 5%. 1000 milliLiter(s) (100 mL/Hr) IV Continuous <Continuous>  dextrose 50% Injectable 25 Gram(s) IV Push once  dextrose 50% Injectable 12.5 Gram(s) IV Push once  epoetin reilly (EPOGEN) Injectable 35267 Unit(s) IV Push <User Schedule>  ferrous    sulfate Liquid 300 milliGRAM(s) Enteral Tube daily  glucagon  Injectable 1 milliGRAM(s) IntraMuscular once  heparin  Infusion. 800 Unit(s)/Hr (8 mL/Hr) IV Continuous <Continuous>  hydrALAZINE 100 milliGRAM(s) Oral three times a day  insulin lispro (ADMELOG) corrective regimen sliding scale   SubCutaneous every 6 hours  insulin NPH human recombinant 4 Unit(s) SubCutaneous every 6 hours  piperacillin/tazobactam IVPB.. 3.375 Gram(s) IV Intermittent every 12 hours  sodium bicarbonate 650 milliGRAM(s) Oral every 8 hours  sodium chloride 3%  Inhalation 4 milliLiter(s) Inhalation every 12 hours      Assessment:  73 year old man with HTN, HLD, T2DM on insulin, and ESRD on HD presents with supply demand ischemia and angina.    Plan of Care:    #Type II myocardial infarction-  Secondary to distributive shock earlier on admission.   The repeat echo shows no new wall motion abnormality.   I would not pursue cath at this time given recurrent aspiration and chronic respiratory failure s/p trach 5/14.   The patient is optimized from a cardiac standpoint to proceed with fistulogram.        #Sinus bradycardia-  No evidence of AV block on admission EKG or telemetry.  No indication for pacing at this time.   - Continue Coreg with holding parameters.     #HTN-  BP elevated prior to AM meds.  Monitor BP response.           #ESRD-  HD as per renal     #DVT   - R common Iliac DVT  - Hep GTT  - AC management as per vascular team.             Over 55 minutes spent on total encounter; more than 50% of the visit was spent counseling and/or coordinating care by the attending physician.      Geovani Bravo MD [facility]  Cardiovascular Disease  [phone]

## 2024-05-26 NOTE — CHART NOTE - NSCHARTNOTEFT_GEN_A_CORE
ACP NIGHT MEDICINE COVERAGE    PM CBC result noted, Hgb 6.2, HCT 19.4.  Repeat CBC sent with FOBT, Hgb 6.3, HCT 19.5.  FOBT positive.  Heparin gtt d/valentin, pt made NPO, Protonix 80mg IVP x1 ordered, pt to start Protonix 40mg IVP BID in AM.  Pt asymptomatic, VSS.  GI Dr. Stallworth notified, in agreement with above, transfuse for Hgb < 7.0, active PRBC consent in chart.  Per discussion, might need consult for IVC filter evaluation in AM.  D/w RN.  Will continue to monitor.    Vital Signs Last 24 Hrs  T(C): 37.2 (26 May 2024 16:00), Max: 37.8 (26 May 2024 04:00)  T(F): 99 (26 May 2024 16:00), Max: 100 (26 May 2024 04:00)  HR: 63 (26 May 2024 16:00) (60 - 68)  BP: 154/64 (26 May 2024 16:00) (139/49 - 191/50)  RR: 12 (26 May 2024 16:00) (12 - 20)  SpO2: 100% (26 May 2024 16:00) (99% - 100%)  O2 Parameters below as of 26 May 2024 16:00  Patient On (Oxygen Delivery Method): ventilator    Bowen Kendall PA-C  Department of Hospital Medicine - Night ACP  In-House Pager: #[phone]

## 2024-05-26 NOTE — PROGRESS NOTE ADULT - SUBJECTIVE AND OBJECTIVE BOX
CHIEF COMPLAINT:Patient is a 73y old  Male who presents with a chief complaint of Unstable angina, abn EKG (26 May 2024 07:47)      INTERVAL EVENTS:     ROS: Seen by bedside during AM rounds     OBJECTIVE:  ICU Vital Signs Last 24 Hrs  T(C): 37.8 (26 May 2024 04:00), Max: 38.3 (25 May 2024 18:25)  T(F): 100 (26 May 2024 04:00), Max: 101 (25 May 2024 18:25)  HR: 66 (26 May 2024 06:30) (63 - 80)  BP: 191/50 (26 May 2024 04:00) (139/49 - 191/50)  BP(mean): --  ABP: --  ABP(mean): --  RR: 20 (26 May 2024 04:00) (19 - 20)  SpO2: 99% (26 May 2024 06:30) (99% - 100%)    O2 Parameters below as of 26 May 2024 06:30  Patient On (Oxygen Delivery Method): ventilator          Mode: AC/ CMV (Assist Control/ Continuous Mandatory Ventilation), RR (machine): 12, TV (machine): 500, FiO2: 30, PEEP: 5, ITime: 0.67, MAP: 8, PIP: 22    05-25 @ 07:01  -  05-26 @ 07:00  --------------------------------------------------------  IN: 540 mL / OUT: 0 mL / NET: 540 mL      CAPILLARY BLOOD GLUCOSE      POCT Blood Glucose.: 230 mg/dL (26 May 2024 05:17)      PHYSICAL EXAM:  General:   HEENT:   Lymph Nodes:  Neck:   Respiratory:   Cardiovascular:   Abdomen:   Extremities:   Skin:   Neurological:  Psychiatry:    Mode: AC/ CMV (Assist Control/ Continuous Mandatory Ventilation)  RR (machine): 12  TV (machine): 500  FiO2: 30  PEEP: 5  ITime: 0.67  MAP: 8  PIP: 22      HOSPITAL MEDICATIONS:  MEDICATIONS  (STANDING):  albuterol/ipratropium for Nebulization 3 milliLiter(s) Nebulizer every 12 hours  aspirin  chewable 81 milliGRAM(s) Oral daily  atorvastatin 20 milliGRAM(s) Oral at bedtime  carvedilol 12.5 milliGRAM(s) Oral every 12 hours  chlorhexidine 0.12% Liquid 15 milliLiter(s) Oral Mucosa every 12 hours  chlorhexidine 2% Cloths 1 Application(s) Topical <User Schedule>  dextrose 10% Bolus 125 milliLiter(s) IV Bolus once  dextrose 5%. 1000 milliLiter(s) (100 mL/Hr) IV Continuous <Continuous>  dextrose 5%. 1000 milliLiter(s) (50 mL/Hr) IV Continuous <Continuous>  dextrose 50% Injectable 12.5 Gram(s) IV Push once  dextrose 50% Injectable 25 Gram(s) IV Push once  epoetin reilly (EPOGEN) Injectable 39735 Unit(s) IV Push <User Schedule>  ferrous    sulfate Liquid 300 milliGRAM(s) Enteral Tube daily  glucagon  Injectable 1 milliGRAM(s) IntraMuscular once  heparin  Infusion. 800 Unit(s)/Hr (8 mL/Hr) IV Continuous <Continuous>  hydrALAZINE 100 milliGRAM(s) Oral three times a day  insulin lispro (ADMELOG) corrective regimen sliding scale   SubCutaneous every 6 hours  insulin NPH human recombinant 4 Unit(s) SubCutaneous every 6 hours  piperacillin/tazobactam IVPB.. 3.375 Gram(s) IV Intermittent every 12 hours  sodium bicarbonate 650 milliGRAM(s) Oral every 8 hours  sodium chloride 3%  Inhalation 4 milliLiter(s) Inhalation every 12 hours    MEDICATIONS  (PRN):  acetaminophen   Oral Liquid .. 650 milliGRAM(s) Oral every 6 hours PRN Temp greater or equal to 38C (100.4F), Moderate Pain (4 - 6)  dextrose Oral Gel 15 Gram(s) Oral once PRN Blood Glucose LESS THAN 70 milliGRAM(s)/deciliter  melatonin 3 milliGRAM(s) Oral at bedtime PRN Insomnia  sodium chloride 0.9% lock flush 10 milliLiter(s) IV Push every 1 hour PRN Pre/post blood products, medications, blood draw, and to maintain line patency      LABS:                        7.0    12.60 )-----------( 447      ( 26 May 2024 01:20 )             21.4     05-26    133<L>  |  95<L>  |  29<H>  ----------------------------< 202<H>  3.6   |  25  |  4.48<H>    Ca    7.8<L>      26 May 2024 01:20  Phos  3.3     05-26  Mg     2.00     05-26    TPro  5.6<L>  /  Alb  2.4<L>  /  TBili  0.2  /  DBili  x   /  AST  21  /  ALT  16  /  AlkPhos  104  05-26    PT/INR - ( 26 May 2024 01:20 )   PT: 15.5 sec;   INR: 1.40 ratio         PTT - ( 26 May 2024 01:20 )  PTT:50.6 sec  Urinalysis Basic - ( 26 May 2024 01:20 )    Color: x / Appearance: x / SG: x / pH: x  Gluc: 202 mg/dL / Ketone: x  / Bili: x / Urobili: x   Blood: x / Protein: x / Nitrite: x   Leuk Esterase: x / RBC: x / WBC x   Sq Epi: x / Non Sq Epi: x / Bacteria: x        Venous Blood Gas:  05-25 @ 18:45  7.44/40/43/27/70.9  VBG Lactate: 1.4   CHIEF COMPLAINT:Patient is a 73y old  Male who presents with a chief complaint of Unstable angina, abn EKG (26 May 2024 07:47)      INTERVAL EVENTS:     ROS: Seen by bedside during AM rounds     OBJECTIVE:  ICU Vital Signs Last 24 Hrs  T(C): 37.8 (26 May 2024 04:00), Max: 38.3 (25 May 2024 18:25)  T(F): 100 (26 May 2024 04:00), Max: 101 (25 May 2024 18:25)  HR: 66 (26 May 2024 06:30) (63 - 80)  BP: 191/50 (26 May 2024 04:00) (139/49 - 191/50)  BP(mean): --  ABP: --  ABP(mean): --  RR: 20 (26 May 2024 04:00) (19 - 20)  SpO2: 99% (26 May 2024 06:30) (99% - 100%)    O2 Parameters below as of 26 May 2024 06:30  Patient On (Oxygen Delivery Method): ventilator          Mode: AC/ CMV (Assist Control/ Continuous Mandatory Ventilation), RR (machine): 12, TV (machine): 500, FiO2: 30, PEEP: 5, ITime: 0.67, MAP: 8, PIP: 22    05-25 @ 07:01  -  05-26 @ 07:00  --------------------------------------------------------  IN: 540 mL / OUT: 0 mL / NET: 540 mL      CAPILLARY BLOOD GLUCOSE      POCT Blood Glucose.: 230 mg/dL (26 May 2024 05:17)      PHYSICAL EXAM:  General: NAD   Neck: (+) Trach tube noted, site c/d/i.  Cards: S1/S2, no murmurs   Pulm: CTA bilaterally. No wheezes. No resp distress.   Abdomen: Soft, NTND. (+) PEG noted, site c/d/i.   Extremities: No pedal edema. Extremities warm to touch. Intact distal pulses.   Neurology: Limited given patient's apparent disinterest in participation. Sleeping but rouses to verbal stimuli. Following basic commands (wiggle fingers/toes). Tracks.   Psych: appears disinterested in examiner.   Skin: warm to touch, color appropriate for ethnicity. Refer to RN assessment for further details.      Mode: AC/ CMV (Assist Control/ Continuous Mandatory Ventilation)  RR (machine): 12  TV (machine): 500  FiO2: 30  PEEP: 5  ITime: 0.67  MAP: 8  PIP: 22      HOSPITAL MEDICATIONS:  MEDICATIONS  (STANDING):  albuterol/ipratropium for Nebulization 3 milliLiter(s) Nebulizer every 12 hours  aspirin  chewable 81 milliGRAM(s) Oral daily  atorvastatin 20 milliGRAM(s) Oral at bedtime  carvedilol 12.5 milliGRAM(s) Oral every 12 hours  chlorhexidine 0.12% Liquid 15 milliLiter(s) Oral Mucosa every 12 hours  chlorhexidine 2% Cloths 1 Application(s) Topical <User Schedule>  dextrose 10% Bolus 125 milliLiter(s) IV Bolus once  dextrose 5%. 1000 milliLiter(s) (100 mL/Hr) IV Continuous <Continuous>  dextrose 5%. 1000 milliLiter(s) (50 mL/Hr) IV Continuous <Continuous>  dextrose 50% Injectable 12.5 Gram(s) IV Push once  dextrose 50% Injectable 25 Gram(s) IV Push once  epoetin reilly (EPOGEN) Injectable 90127 Unit(s) IV Push <User Schedule>  ferrous    sulfate Liquid 300 milliGRAM(s) Enteral Tube daily  glucagon  Injectable 1 milliGRAM(s) IntraMuscular once  heparin  Infusion. 800 Unit(s)/Hr (8 mL/Hr) IV Continuous <Continuous>  hydrALAZINE 100 milliGRAM(s) Oral three times a day  insulin lispro (ADMELOG) corrective regimen sliding scale   SubCutaneous every 6 hours  insulin NPH human recombinant 4 Unit(s) SubCutaneous every 6 hours  piperacillin/tazobactam IVPB.. 3.375 Gram(s) IV Intermittent every 12 hours  sodium bicarbonate 650 milliGRAM(s) Oral every 8 hours  sodium chloride 3%  Inhalation 4 milliLiter(s) Inhalation every 12 hours    MEDICATIONS  (PRN):  acetaminophen   Oral Liquid .. 650 milliGRAM(s) Oral every 6 hours PRN Temp greater or equal to 38C (100.4F), Moderate Pain (4 - 6)  dextrose Oral Gel 15 Gram(s) Oral once PRN Blood Glucose LESS THAN 70 milliGRAM(s)/deciliter  melatonin 3 milliGRAM(s) Oral at bedtime PRN Insomnia  sodium chloride 0.9% lock flush 10 milliLiter(s) IV Push every 1 hour PRN Pre/post blood products, medications, blood draw, and to maintain line patency      LABS:                        7.0    12.60 )-----------( 447      ( 26 May 2024 01:20 )             21.4     05-26    133<L>  |  95<L>  |  29<H>  ----------------------------<  202<H>  3.6   |  25  |  4.48<H>    Ca    7.8<L>      26 May 2024 01:20  Phos  3.3     05-26  Mg     2.00     05-26    TPro  5.6<L>  /  Alb  2.4<L>  /  TBili  0.2  /  DBili  x   /  AST  21  /  ALT  16  /  AlkPhos  104  05-26    PT/INR - ( 26 May 2024 01:20 )   PT: 15.5 sec;   INR: 1.40 ratio         PTT - ( 26 May 2024 01:20 )  PTT:50.6 sec  Urinalysis Basic - ( 26 May 2024 01:20 )    Color: x / Appearance: x / SG: x / pH: x  Gluc: 202 mg/dL / Ketone: x  / Bili: x / Urobili: x   Blood: x / Protein: x / Nitrite: x   Leuk Esterase: x / RBC: x / WBC x   Sq Epi: x / Non Sq Epi: x / Bacteria: x        Venous Blood Gas:  05-25 @ 18:45  7.44/40/43/27/70.9  VBG Lactate: 1.4   CHIEF COMPLAINT:Patient is a 73y old  Male who presents with a chief complaint of Unstable angina, abn EKG (26 May 2024 07:47)      INTERVAL EVENTS: last evening became febrile. Will c/w Zosyn and f/u repeat cultures sent. Plan for fistulogram on hold. No events on tele, NSR underlying rhythm.     ROS: Seen by bedside during AM rounds     OBJECTIVE:  ICU Vital Signs Last 24 Hrs  T(C): 37.8 (26 May 2024 04:00), Max: 38.3 (25 May 2024 18:25)  T(F): 100 (26 May 2024 04:00), Max: 101 (25 May 2024 18:25)  HR: 66 (26 May 2024 06:30) (63 - 80)  BP: 191/50 (26 May 2024 04:00) (139/49 - 191/50)  BP(mean): --  ABP: --  ABP(mean): --  RR: 20 (26 May 2024 04:00) (19 - 20)  SpO2: 99% (26 May 2024 06:30) (99% - 100%)    O2 Parameters below as of 26 May 2024 06:30  Patient On (Oxygen Delivery Method): ventilator          Mode: AC/ CMV (Assist Control/ Continuous Mandatory Ventilation), RR (machine): 12, TV (machine): 500, FiO2: 30, PEEP: 5, ITime: 0.67, MAP: 8, PIP: 22    05-25 @ 07:01  -  05-26 @ 07:00  --------------------------------------------------------  IN: 540 mL / OUT: 0 mL / NET: 540 mL      CAPILLARY BLOOD GLUCOSE      POCT Blood Glucose.: 230 mg/dL (26 May 2024 05:17)      PHYSICAL EXAM:  General: NAD   Neck: (+) Trach tube noted, site c/d/i.  Cards: S1/S2, no murmurs   Pulm: CTA bilaterally. No wheezes. No resp distress.   Abdomen: Soft, NTND. (+) PEG noted, site c/d/i.   Extremities: No pedal edema. Extremities warm to touch. Intact distal pulses.   Neurology: Limited given patient's apparent disinterest in participation. Sleeping but rouses to verbal stimuli. Following basic commands (wiggle fingers/toes). Tracks.   Psych: appears withdrawn.  Skin: warm to touch, color appropriate for ethnicity. Refer to RN assessment for further details.      Mode: AC/ CMV (Assist Control/ Continuous Mandatory Ventilation)  RR (machine): 12  TV (machine): 500  FiO2: 30  PEEP: 5  ITime: 0.67  MAP: 8  PIP: 22      HOSPITAL MEDICATIONS:  MEDICATIONS  (STANDING):  albuterol/ipratropium for Nebulization 3 milliLiter(s) Nebulizer every 12 hours  aspirin  chewable 81 milliGRAM(s) Oral daily  atorvastatin 20 milliGRAM(s) Oral at bedtime  carvedilol 12.5 milliGRAM(s) Oral every 12 hours  chlorhexidine 0.12% Liquid 15 milliLiter(s) Oral Mucosa every 12 hours  chlorhexidine 2% Cloths 1 Application(s) Topical <User Schedule>  dextrose 10% Bolus 125 milliLiter(s) IV Bolus once  dextrose 5%. 1000 milliLiter(s) (100 mL/Hr) IV Continuous <Continuous>  dextrose 5%. 1000 milliLiter(s) (50 mL/Hr) IV Continuous <Continuous>  dextrose 50% Injectable 12.5 Gram(s) IV Push once  dextrose 50% Injectable 25 Gram(s) IV Push once  epoetin reilly (EPOGEN) Injectable 69505 Unit(s) IV Push <User Schedule>  ferrous    sulfate Liquid 300 milliGRAM(s) Enteral Tube daily  glucagon  Injectable 1 milliGRAM(s) IntraMuscular once  heparin  Infusion. 800 Unit(s)/Hr (8 mL/Hr) IV Continuous <Continuous>  hydrALAZINE 100 milliGRAM(s) Oral three times a day  insulin lispro (ADMELOG) corrective regimen sliding scale   SubCutaneous every 6 hours  insulin NPH human recombinant 4 Unit(s) SubCutaneous every 6 hours  piperacillin/tazobactam IVPB.. 3.375 Gram(s) IV Intermittent every 12 hours  sodium bicarbonate 650 milliGRAM(s) Oral every 8 hours  sodium chloride 3%  Inhalation 4 milliLiter(s) Inhalation every 12 hours    MEDICATIONS  (PRN):  acetaminophen   Oral Liquid .. 650 milliGRAM(s) Oral every 6 hours PRN Temp greater or equal to 38C (100.4F), Moderate Pain (4 - 6)  dextrose Oral Gel 15 Gram(s) Oral once PRN Blood Glucose LESS THAN 70 milliGRAM(s)/deciliter  melatonin 3 milliGRAM(s) Oral at bedtime PRN Insomnia  sodium chloride 0.9% lock flush 10 milliLiter(s) IV Push every 1 hour PRN Pre/post blood products, medications, blood draw, and to maintain line patency      LABS:                        7.0    12.60 )-----------( 447      ( 26 May 2024 01:20 )             21.4     05-26    133<L>  |  95<L>  |  29<H>  ----------------------------<  202<H>  3.6   |  25  |  4.48<H>    Ca    7.8<L>      26 May 2024 01:20  Phos  3.3     05-26  Mg     2.00     05-26    TPro  5.6<L>  /  Alb  2.4<L>  /  TBili  0.2  /  DBili  x   /  AST  21  /  ALT  16  /  AlkPhos  104  05-26    PT/INR - ( 26 May 2024 01:20 )   PT: 15.5 sec;   INR: 1.40 ratio         PTT - ( 26 May 2024 01:20 )  PTT:50.6 sec  Urinalysis Basic - ( 26 May 2024 01:20 )    Color: x / Appearance: x / SG: x / pH: x  Gluc: 202 mg/dL / Ketone: x  / Bili: x / Urobili: x   Blood: x / Protein: x / Nitrite: x   Leuk Esterase: x / RBC: x / WBC x   Sq Epi: x / Non Sq Epi: x / Bacteria: x        Venous Blood Gas:  05-25 @ 18:45  7.44/40/43/27/70.9  VBG Lactate: 1.4

## 2024-05-26 NOTE — PROGRESS NOTE ADULT - SUBJECTIVE AND OBJECTIVE BOX
Patient is a 73y old  Male who presents with a chief complaint of Unstable angina, abn EKG (26 May 2024 11:13)      SUBJECTIVE / OVERNIGHT EVENTS: Tmax 101, fistulogram was cancelled on 5/25 2/2 fever    MEDICATIONS  (STANDING):  albuterol/ipratropium for Nebulization 3 milliLiter(s) Nebulizer every 12 hours  aspirin  chewable 81 milliGRAM(s) Oral daily  atorvastatin 20 milliGRAM(s) Oral at bedtime  carvedilol 12.5 milliGRAM(s) Oral every 12 hours  chlorhexidine 0.12% Liquid 15 milliLiter(s) Oral Mucosa every 12 hours  chlorhexidine 2% Cloths 1 Application(s) Topical <User Schedule>  dextrose 10% Bolus 125 milliLiter(s) IV Bolus once  dextrose 5%. 1000 milliLiter(s) (50 mL/Hr) IV Continuous <Continuous>  dextrose 5%. 1000 milliLiter(s) (100 mL/Hr) IV Continuous <Continuous>  dextrose 50% Injectable 12.5 Gram(s) IV Push once  dextrose 50% Injectable 25 Gram(s) IV Push once  epoetin reilly (EPOGEN) Injectable 99805 Unit(s) IV Push <User Schedule>  ferrous    sulfate Liquid 300 milliGRAM(s) Enteral Tube daily  glucagon  Injectable 1 milliGRAM(s) IntraMuscular once  heparin  Infusion. 800 Unit(s)/Hr (8 mL/Hr) IV Continuous <Continuous>  hydrALAZINE 100 milliGRAM(s) Oral three times a day  insulin lispro (ADMELOG) corrective regimen sliding scale   SubCutaneous every 6 hours  insulin NPH human recombinant 4 Unit(s) SubCutaneous every 6 hours  piperacillin/tazobactam IVPB.. 3.375 Gram(s) IV Intermittent every 12 hours  sodium bicarbonate 650 milliGRAM(s) Oral every 8 hours  sodium chloride 3%  Inhalation 4 milliLiter(s) Inhalation every 12 hours    MEDICATIONS  (PRN):  acetaminophen   Oral Liquid .. 650 milliGRAM(s) Oral every 6 hours PRN Temp greater or equal to 38C (100.4F), Moderate Pain (4 - 6)  dextrose Oral Gel 15 Gram(s) Oral once PRN Blood Glucose LESS THAN 70 milliGRAM(s)/deciliter  melatonin 3 milliGRAM(s) Oral at bedtime PRN Insomnia  sodium chloride 0.9% lock flush 10 milliLiter(s) IV Push every 1 hour PRN Pre/post blood products, medications, blood draw, and to maintain line patency      Vital Signs Last 24 Hrs  T(F): 100 (05-26-24 @ 04:00), Max: 101 (05-25-24 @ 18:25)  HR: 64 (05-26-24 @ 08:00) (63 - 80)  BP: 161/53 (05-26-24 @ 08:00) (139/49 - 191/50)  RR: 14 (05-26-24 @ 08:00) (14 - 20)  SpO2: 100% (05-26-24 @ 08:00) (99% - 100%)  Telemetry:   CAPILLARY BLOOD GLUCOSE      POCT Blood Glucose.: 228 mg/dL (26 May 2024 11:14)  POCT Blood Glucose.: 230 mg/dL (26 May 2024 05:17)  POCT Blood Glucose.: 239 mg/dL (25 May 2024 23:28)  POCT Blood Glucose.: 135 mg/dL (25 May 2024 17:10)    I&O's Summary    25 May 2024 07:01  -  26 May 2024 07:00  --------------------------------------------------------  IN: 540 mL / OUT: 0 mL / NET: 540 mL        PHYSICAL EXAM:  GENERAL: NAD, well-developed  HEAD:  Atraumatic, Normocephalic  EYES: EOMI, PERRLA, conjunctiva and sclera clear  NECK: Supple, No JVD  CHEST/LUNG: Clear to auscultation bilaterally; No wheeze  HEART: Regular rate and rhythm; No murmurs, rubs, or gallops  ABDOMEN: Soft, Nontender, Nondistended; Bowel sounds present  EXTREMITIES:  2+ Peripheral Pulses, No clubbing, cyanosis, or edema  PSYCH: AAOx3  NEUROLOGY: non-focal  SKIN: No rashes or lesions    LABS:                        7.1    12.15 )-----------( 420      ( 26 May 2024 08:38 )             22.4     05-26    133<L>  |  95<L>  |  29<H>  ----------------------------<  202<H>  3.6   |  25  |  4.48<H>    Ca    7.8<L>      26 May 2024 01:20  Phos  3.3     05-26  Mg     2.00     05-26    TPro  5.6<L>  /  Alb  2.4<L>  /  TBili  0.2  /  DBili  x   /  AST  21  /  ALT  16  /  AlkPhos  104  05-26    PT/INR - ( 26 May 2024 01:20 )   PT: 15.5 sec;   INR: 1.40 ratio         PTT - ( 26 May 2024 08:38 )  PTT:71.2 sec      Urinalysis Basic - ( 26 May 2024 01:20 )    Color: x / Appearance: x / SG: x / pH: x  Gluc: 202 mg/dL / Ketone: x  / Bili: x / Urobili: x   Blood: x / Protein: x / Nitrite: x   Leuk Esterase: x / RBC: x / WBC x   Sq Epi: x / Non Sq Epi: x / Bacteria: x        RADIOLOGY & ADDITIONAL TESTS:    Imaging Personally Reviewed:    Consultant(s) Notes Reviewed:      Care Discussed with Consultants/Other Providers:

## 2024-05-27 LAB
ANION GAP SERPL CALC-SCNC: 18 MMOL/L — HIGH (ref 7–14)
BASOPHILS # BLD AUTO: 0.05 K/UL — SIGNIFICANT CHANGE UP (ref 0–0.2)
BASOPHILS NFR BLD AUTO: 0.6 % — SIGNIFICANT CHANGE UP (ref 0–2)
BUN SERPL-MCNC: 43 MG/DL — HIGH (ref 7–23)
CALCIUM SERPL-MCNC: 8.2 MG/DL — LOW (ref 8.4–10.5)
CHLORIDE SERPL-SCNC: 93 MMOL/L — LOW (ref 98–107)
CO2 SERPL-SCNC: 23 MMOL/L — SIGNIFICANT CHANGE UP (ref 22–31)
CREAT SERPL-MCNC: 5.73 MG/DL — HIGH (ref 0.5–1.3)
CULTURE RESULTS: ABNORMAL
EGFR: 10 ML/MIN/1.73M2 — LOW
EOSINOPHIL # BLD AUTO: 0.48 K/UL — SIGNIFICANT CHANGE UP (ref 0–0.5)
EOSINOPHIL NFR BLD AUTO: 5.6 % — SIGNIFICANT CHANGE UP (ref 0–6)
GLUCOSE BLDC GLUCOMTR-MCNC: 101 MG/DL — HIGH (ref 70–99)
GLUCOSE BLDC GLUCOMTR-MCNC: 120 MG/DL — HIGH (ref 70–99)
GLUCOSE BLDC GLUCOMTR-MCNC: 132 MG/DL — HIGH (ref 70–99)
GLUCOSE BLDC GLUCOMTR-MCNC: 151 MG/DL — HIGH (ref 70–99)
GLUCOSE SERPL-MCNC: 135 MG/DL — HIGH (ref 70–99)
HCT VFR BLD CALC: 23.9 % — LOW (ref 39–50)
HCT VFR BLD CALC: 26.3 % — LOW (ref 39–50)
HGB BLD-MCNC: 7.8 G/DL — LOW (ref 13–17)
HGB BLD-MCNC: 8.4 G/DL — LOW (ref 13–17)
IANC: 6.42 K/UL — SIGNIFICANT CHANGE UP (ref 1.8–7.4)
IMM GRANULOCYTES NFR BLD AUTO: 0.8 % — SIGNIFICANT CHANGE UP (ref 0–0.9)
LYMPHOCYTES # BLD AUTO: 0.97 K/UL — LOW (ref 1–3.3)
LYMPHOCYTES # BLD AUTO: 11.3 % — LOW (ref 13–44)
MAGNESIUM SERPL-MCNC: 2.1 MG/DL — SIGNIFICANT CHANGE UP (ref 1.6–2.6)
MCHC RBC-ENTMCNC: 21.6 PG — LOW (ref 27–34)
MCHC RBC-ENTMCNC: 22 PG — LOW (ref 27–34)
MCHC RBC-ENTMCNC: 31.9 GM/DL — LOW (ref 32–36)
MCHC RBC-ENTMCNC: 32.6 GM/DL — SIGNIFICANT CHANGE UP (ref 32–36)
MCV RBC AUTO: 67.3 FL — LOW (ref 80–100)
MCV RBC AUTO: 67.8 FL — LOW (ref 80–100)
MONOCYTES # BLD AUTO: 0.63 K/UL — SIGNIFICANT CHANGE UP (ref 0–0.9)
MONOCYTES NFR BLD AUTO: 7.3 % — SIGNIFICANT CHANGE UP (ref 2–14)
NEUTROPHILS # BLD AUTO: 6.42 K/UL — SIGNIFICANT CHANGE UP (ref 1.8–7.4)
NEUTROPHILS NFR BLD AUTO: 74.4 % — SIGNIFICANT CHANGE UP (ref 43–77)
NRBC # BLD: 0 /100 WBCS — SIGNIFICANT CHANGE UP (ref 0–0)
NRBC # BLD: 0 /100 WBCS — SIGNIFICANT CHANGE UP (ref 0–0)
NRBC # FLD: 0.03 K/UL — HIGH (ref 0–0)
NRBC # FLD: 0.04 K/UL — HIGH (ref 0–0)
PHOSPHATE SERPL-MCNC: 4.7 MG/DL — HIGH (ref 2.5–4.5)
PLATELET # BLD AUTO: 375 K/UL — SIGNIFICANT CHANGE UP (ref 150–400)
PLATELET # BLD AUTO: 375 K/UL — SIGNIFICANT CHANGE UP (ref 150–400)
POTASSIUM SERPL-MCNC: 4.1 MMOL/L — SIGNIFICANT CHANGE UP (ref 3.5–5.3)
POTASSIUM SERPL-SCNC: 4.1 MMOL/L — SIGNIFICANT CHANGE UP (ref 3.5–5.3)
RBC # BLD: 3.55 M/UL — LOW (ref 4.2–5.8)
RBC # BLD: 3.88 M/UL — LOW (ref 4.2–5.8)
RBC # FLD: 21.7 % — HIGH (ref 10.3–14.5)
RBC # FLD: 22.8 % — HIGH (ref 10.3–14.5)
SODIUM SERPL-SCNC: 134 MMOL/L — LOW (ref 135–145)
SPECIMEN SOURCE: SIGNIFICANT CHANGE UP
WBC # BLD: 7.04 K/UL — SIGNIFICANT CHANGE UP (ref 3.8–10.5)
WBC # BLD: 8.62 K/UL — SIGNIFICANT CHANGE UP (ref 3.8–10.5)
WBC # FLD AUTO: 7.04 K/UL — SIGNIFICANT CHANGE UP (ref 3.8–10.5)
WBC # FLD AUTO: 8.62 K/UL — SIGNIFICANT CHANGE UP (ref 3.8–10.5)

## 2024-05-27 PROCEDURE — 99233 SBSQ HOSP IP/OBS HIGH 50: CPT

## 2024-05-27 RX ORDER — HUMAN INSULIN 100 [IU]/ML
2 INJECTION, SUSPENSION SUBCUTANEOUS EVERY 6 HOURS
Refills: 0 | Status: DISCONTINUED | OUTPATIENT
Start: 2024-05-27 | End: 2024-05-28

## 2024-05-27 RX ORDER — HYDRALAZINE HCL 50 MG
5 TABLET ORAL ONCE
Refills: 0 | Status: COMPLETED | OUTPATIENT
Start: 2024-05-27 | End: 2024-05-27

## 2024-05-27 RX ADMIN — PANTOPRAZOLE SODIUM 40 MILLIGRAM(S): 20 TABLET, DELAYED RELEASE ORAL at 05:26

## 2024-05-27 RX ADMIN — Medication 100 MILLIGRAM(S): at 11:32

## 2024-05-27 RX ADMIN — Medication 650 MILLIGRAM(S): at 20:44

## 2024-05-27 RX ADMIN — HUMAN INSULIN 4 UNIT(S): 100 INJECTION, SUSPENSION SUBCUTANEOUS at 11:30

## 2024-05-27 RX ADMIN — HUMAN INSULIN 4 UNIT(S): 100 INJECTION, SUSPENSION SUBCUTANEOUS at 17:22

## 2024-05-27 RX ADMIN — PIPERACILLIN AND TAZOBACTAM 25 GRAM(S): 4; .5 INJECTION, POWDER, LYOPHILIZED, FOR SOLUTION INTRAVENOUS at 05:26

## 2024-05-27 RX ADMIN — CHLORHEXIDINE GLUCONATE 15 MILLILITER(S): 213 SOLUTION TOPICAL at 17:19

## 2024-05-27 RX ADMIN — ATORVASTATIN CALCIUM 20 MILLIGRAM(S): 80 TABLET, FILM COATED ORAL at 20:44

## 2024-05-27 RX ADMIN — CHLORHEXIDINE GLUCONATE 1 APPLICATION(S): 213 SOLUTION TOPICAL at 05:27

## 2024-05-27 RX ADMIN — Medication 650 MILLIGRAM(S): at 21:44

## 2024-05-27 RX ADMIN — Medication 3 MILLILITER(S): at 19:32

## 2024-05-27 RX ADMIN — Medication 100 MILLIGRAM(S): at 20:44

## 2024-05-27 RX ADMIN — PIPERACILLIN AND TAZOBACTAM 25 GRAM(S): 4; .5 INJECTION, POWDER, LYOPHILIZED, FOR SOLUTION INTRAVENOUS at 17:18

## 2024-05-27 RX ADMIN — Medication 650 MILLIGRAM(S): at 20:41

## 2024-05-27 RX ADMIN — SODIUM CHLORIDE 4 MILLILITER(S): 9 INJECTION INTRAMUSCULAR; INTRAVENOUS; SUBCUTANEOUS at 19:33

## 2024-05-27 RX ADMIN — CARVEDILOL PHOSPHATE 12.5 MILLIGRAM(S): 80 CAPSULE, EXTENDED RELEASE ORAL at 20:44

## 2024-05-27 RX ADMIN — SODIUM CHLORIDE 4 MILLILITER(S): 9 INJECTION INTRAMUSCULAR; INTRAVENOUS; SUBCUTANEOUS at 08:43

## 2024-05-27 RX ADMIN — HUMAN INSULIN 2 UNIT(S): 100 INJECTION, SUSPENSION SUBCUTANEOUS at 23:31

## 2024-05-27 RX ADMIN — Medication 300 MILLIGRAM(S): at 11:31

## 2024-05-27 RX ADMIN — Medication 3 MILLILITER(S): at 08:43

## 2024-05-27 RX ADMIN — CHLORHEXIDINE GLUCONATE 15 MILLILITER(S): 213 SOLUTION TOPICAL at 05:27

## 2024-05-27 RX ADMIN — Medication 650 MILLIGRAM(S): at 13:08

## 2024-05-27 RX ADMIN — Medication 5 MILLIGRAM(S): at 08:30

## 2024-05-27 RX ADMIN — PANTOPRAZOLE SODIUM 40 MILLIGRAM(S): 20 TABLET, DELAYED RELEASE ORAL at 17:20

## 2024-05-27 NOTE — PROGRESS NOTE ADULT - SUBJECTIVE AND OBJECTIVE BOX
Cardiovascular Disease Progress Note  DATE OF SERVICE: 24 @ 10:03    Overnight events: HTN noted.   HD underway at bedside.        Objective Findings:  T(C): 36.9 (24 @ 09:00), Max: 37.3 (24 @ 20:00)  HR: 58 (24 @ 09:30) (56 - 64)  BP: 208/55 (24 @ 09:00) (124/42 - 208/55)  RR: 14 (24 @ 09:00) (12 - 17)  SpO2: 100% (24 @ 09:30) (100% - 100%)  Wt(kg): --  Daily     Daily Weight in k (27 May 2024 09:00)      Physical Exam:  Gen: NAD; Patient resting comfortably  HEENT: EOMI, Normocephalic/ atraumatic  CV: RRR, normal S1 + S2, no m/r/g  Lungs:  Normal respiratory effort; clear to auscultation bilaterally  Abd: soft, non-tender; bowel sounds present  Ext: No edema; warm and well perfused    Telemetry: n/a    Laboratory Data:                        8.4    8.62  )-----------( 375      ( 27 May 2024 05:45 )             26.3         134<L>  |  93<L>  |  43<H>  ----------------------------<  135<H>  4.1   |  23  |  5.73<H>    Ca    8.2<L>      27 May 2024 05:45  Phos  4.7       Mg     2.10         TPro  5.6<L>  /  Alb  2.4<L>  /  TBili  0.2  /  DBili  x   /  AST  21  /  ALT  16  /  AlkPhos  104      PT/INR - ( 26 May 2024 01:20 )   PT: 15.5 sec;   INR: 1.40 ratio         PTT - ( 26 May 2024 15:33 )  PTT:77.0 sec          Inpatient Medications:  MEDICATIONS  (STANDING):  albuterol/ipratropium for Nebulization 3 milliLiter(s) Nebulizer every 12 hours  atorvastatin 20 milliGRAM(s) Oral at bedtime  carvedilol 12.5 milliGRAM(s) Oral every 12 hours  chlorhexidine 0.12% Liquid 15 milliLiter(s) Oral Mucosa every 12 hours  chlorhexidine 2% Cloths 1 Application(s) Topical <User Schedule>  dextrose 10% Bolus 125 milliLiter(s) IV Bolus once  dextrose 5%. 1000 milliLiter(s) (100 mL/Hr) IV Continuous <Continuous>  dextrose 5%. 1000 milliLiter(s) (50 mL/Hr) IV Continuous <Continuous>  dextrose 50% Injectable 12.5 Gram(s) IV Push once  dextrose 50% Injectable 25 Gram(s) IV Push once  epoetin reilly (EPOGEN) Injectable 21738 Unit(s) IV Push <User Schedule>  ferrous    sulfate Liquid 300 milliGRAM(s) Enteral Tube daily  glucagon  Injectable 1 milliGRAM(s) IntraMuscular once  hydrALAZINE 100 milliGRAM(s) Oral three times a day  insulin lispro (ADMELOG) corrective regimen sliding scale   SubCutaneous every 6 hours  insulin NPH human recombinant 4 Unit(s) SubCutaneous every 6 hours  pantoprazole  Injectable 40 milliGRAM(s) IV Push every 12 hours  piperacillin/tazobactam IVPB.. 3.375 Gram(s) IV Intermittent every 12 hours  sodium bicarbonate 650 milliGRAM(s) Oral every 8 hours  sodium chloride 3%  Inhalation 4 milliLiter(s) Inhalation every 12 hours      Assessment: 73 year old man with HTN, HLD, T2DM on insulin, and ESRD on HD presents with supply demand ischemia and angina.    Plan of Care:    #Type II myocardial infarction-  Secondary to distributive shock earlier on admission.   The repeat echo shows no new wall motion abnormality.   I would not pursue cath at this time given recurrent aspiration and chronic respiratory failure s/p trach .   The patient is optimized from a cardiac standpoint to proceed with fistulogram once cleared from ID.        #Sinus bradycardia-  No evidence of AV block on admission EKG or telemetry.  No indication for pacing at this time.   - Continue Coreg with holding parameters.     #HTN-  BP elevated   HD underway.           #ESRD-  HD as per renal     #DVT   - R common Iliac DVT  - Hep GTT  - AC management as per vascular team.                Over 55 minutes spent on total encounter; more than 50% of the visit was spent counseling and/or coordinating care by the attending physician.      Geovani Bravo MD [facility]  Cardiovascular Disease  [phone]

## 2024-05-27 NOTE — PROGRESS NOTE ADULT - SUBJECTIVE AND OBJECTIVE BOX
Patient is a 73y Male     Patient is a 73y old  Male who presents with a chief complaint of Unstable angina, abn EKG (27 May 2024 08:16)      HPI:  73-year-old male past medical history hypertension, ESRD on dialysis Monday Wednesday Friday, diabetes insulin-dependent presents with hiccups patient endorses persistent hiccups for the last 2 days. found to have peaked T waves, a new finding. denies dizziness, N/V, denies CP, palps, abd pain (29 Apr 2024 21:15)      PAST MEDICAL & SURGICAL HISTORY:  Diabetes      Benign essential HTN      HLD (hyperlipidemia)      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      ESRD on hemodialysis      Arteriovenous fistula          MEDICATIONS  (STANDING):  albuterol/ipratropium for Nebulization 3 milliLiter(s) Nebulizer every 12 hours  atorvastatin 20 milliGRAM(s) Oral at bedtime  carvedilol 12.5 milliGRAM(s) Oral every 12 hours  chlorhexidine 0.12% Liquid 15 milliLiter(s) Oral Mucosa every 12 hours  chlorhexidine 2% Cloths 1 Application(s) Topical <User Schedule>  dextrose 10% Bolus 125 milliLiter(s) IV Bolus once  dextrose 5%. 1000 milliLiter(s) (50 mL/Hr) IV Continuous <Continuous>  dextrose 5%. 1000 milliLiter(s) (100 mL/Hr) IV Continuous <Continuous>  dextrose 50% Injectable 12.5 Gram(s) IV Push once  dextrose 50% Injectable 25 Gram(s) IV Push once  epoetin reilly (EPOGEN) Injectable 56961 Unit(s) IV Push <User Schedule>  ferrous    sulfate Liquid 300 milliGRAM(s) Enteral Tube daily  glucagon  Injectable 1 milliGRAM(s) IntraMuscular once  hydrALAZINE 100 milliGRAM(s) Oral three times a day  insulin lispro (ADMELOG) corrective regimen sliding scale   SubCutaneous every 6 hours  insulin NPH human recombinant 4 Unit(s) SubCutaneous every 6 hours  pantoprazole  Injectable 40 milliGRAM(s) IV Push every 12 hours  piperacillin/tazobactam IVPB.. 3.375 Gram(s) IV Intermittent every 12 hours  sodium bicarbonate 650 milliGRAM(s) Oral every 8 hours  sodium chloride 3%  Inhalation 4 milliLiter(s) Inhalation every 12 hours      Allergies    No Known Allergies    Intolerances        SOCIAL HISTORY:  Denies ETOh,Smoking,     FAMILY HISTORY:  FHx: diabetes mellitus (Father, Aunt)        REVIEW OF SYSTEMS:    CONSTITUTIONAL: No weakness, fevers or chills  EYES/ENT: No visual changes;  No vertigo or throat pain   NECK: No pain or stiffness  RESPIRATORY: No cough, wheezing, hemoptysis; No shortness of breath  CARDIOVASCULAR: No chest pain or palpitations  GASTROINTESTINAL: No abdominal or epigastric pain. No nausea, vomiting, or hematemesis; No diarrhea or constipation. No melena or hematochezia.  GENITOURINARY: No dysuria, frequency or hematuria  NEUROLOGICAL: No numbness or weakness  SKIN: No itching, burning, rashes, or lesions   All other review of systems is negative unless indicated above.    VITAL:  T(C): , Max: 37.3 (05-26-24 @ 20:00)  T(F): , Max: 99.1 (05-26-24 @ 20:00)  HR: 58 (05-27-24 @ 08:16)  BP: 200/169 (05-27-24 @ 08:16)  BP(mean): --  RR: 17 (05-27-24 @ 08:16)  SpO2: 100% (05-27-24 @ 08:16)  Wt(kg): --    I and O's:    05-25 @ 07:01  -  05-26 @ 07:00  --------------------------------------------------------  IN: 540 mL / OUT: 0 mL / NET: 540 mL          PHYSICAL EXAM:    Constitutional: NAD  HEENT: PERRLA,   Neck: No JVD  Respiratory: CTA B/L  Cardiovascular: S1 and S2  Gastrointestinal: BS+, soft, NT/ND  Extremities: No peripheral edema  Neurological: A/O x 3, no focal deficits  Psychiatric: Normal mood, normal affect  : No Abbasi  Skin: No rashes  Access: Not applicable  Back: No CVA tenderness    LABS:                        8.4    8.62  )-----------( 375      ( 27 May 2024 05:45 )             26.3     05-27    134<L>  |  93<L>  |  43<H>  ----------------------------<  135<H>  4.1   |  23  |  5.73<H>    Ca    8.2<L>      27 May 2024 05:45  Phos  4.7     05-27  Mg     2.10     05-27    TPro  5.6<L>  /  Alb  2.4<L>  /  TBili  0.2  /  DBili  x   /  AST  21  /  ALT  16  /  AlkPhos  104  05-26          RADIOLOGY & ADDITIONAL STUDIES:

## 2024-05-27 NOTE — PROGRESS NOTE ADULT - NS ATTEND AMEND GEN_ALL_CORE FT
Pt is a 73M w/ MHx ESRD on HD, HTN, and DMII initialled presenting to [facility] on 4/29/24 with chest pressure and hiccups admitted 2/2 concern for demand ischemia with hospital course c/b baclofen toxicity and acute ischemic CVA left talya/cerebellum c/b acute hypoxemic and hypercapnic respiratory failure s/p ETT intubation/MV with failure to wean from ventilator s/p tracheostomy. Hospital course further complicated by aspiration and B cereus bacteremia and RLE DVT. Now transferred to RCU 5/16 for further management.     Pt with acute encephalopathy while in hospital concerning for baclofen toxicity. Pt further with MRI Head (5/6) concerning for left talya and left cerebellum ischemic infarct with no vessel occlusion/stenosis on MRA. Continue on aspirin and hold DAPT for now pending procedures. Statin in place. Neurology recs appreciated. PT/OT consulted.    Pt with acute combined hypoxemic and hypercapnic respiratory failure s/p ETT intubation/MV with failure to wean from ventilator requiring tracheostomy placement (IP 5/14). SBTs as tolerated. ABG on current settings wnl. Trach care and suctioning as per RCU team. Oral hygiene and aspiration precautions in place. Wean O2 supplementation for goal O2 saturation 90-95%. Airway clearance therapy in place.     Pt initially admitted with demand ischemia versus NSTEMI. EKG concerning for ST deviation/t-wave peak with (+) troponin leak. TTE (4/30) without regional wall abnormalities. No indication for catheterization at this time. DAP initiated. Cardiology following and recs appreciated. Pt remains HD stable and clinically euvolemic. No events on telemetry. Cardiology following, recs appreciated.     Pt with oropharyngeal dysphagia s/p PEG placement (Surgery, 5/17). Pt was tolerating feeds at goal rate. Had an episode of melena with drop in Hgb overnight. Heparin gtt d/valentin, pt made NPO. GI consulted, but not a candidate for endoscopy given high surgical risk. Will CTM carefully. PPI BID initiated. Pt responded well to 1U pRBC. Restarted trickle feeds, no further episodes of melena since last night. CBC q12hr.     Pt with ESRD via right AVF found to have stenosis requiring temporary access. Right Shiley removed (5/10) 2/2 bacteremia, replaced 5/13. Pt was planning for fistulogram with vascular sx however had fever this weekend so case cancelled. Vascular sx recommends repeat duplex of AVF and vein mapping for possible re-intervention planning instead. Given fever, plan for line holiday after next HD session now. HD as per nephrology team.     Hospital course further c/b aspiration PNA c/b B. cereus bacteremia (5/10) with clearance of cx 5/12, now s/p completion of Abx with Zosyn through 5/27 and Vancomycin through 5/21. Pt with fever overnight, repeat cx sent. Plan for HD with line holiday followed by IR catheter before next HD session.     Hospital course further c/b RLE DVT, on heparin drip. Initially held in preparation for OR as per Vascular sx team, will now hold 2/2 concern for possible GIB. Will need possible permacath.     Dispo pending medical optimization. Pt full code. DVT ppx heparin drip full A/C, held so will need SCDs. PT consulted. Discussed with pt and wife at bedside today, will continue to update throughout hospitalization.

## 2024-05-27 NOTE — CHART NOTE - NSCHARTNOTEFT_GEN_A_CORE
Patient with new fever two days ago with hx bacteremia earlier this admission. Shiley now in place approximately 2 weeks. D/w IR, recommending line holiday, which Nephrology & ID were in agreement with.     Dressing removed. Two anchoring sutures incised with scalpel and removed. Shiley catheter was removed from site and direct manual pressure for 2 mins was applied with folded gauze with successful hemostasis. Pressure dressing applied (gauze and tegaderm). Primary RN present at bedside during event. No immediate complications.

## 2024-05-27 NOTE — PROGRESS NOTE ADULT - ASSESSMENT
trach and peg  high risk endoscopic ealation  and had egd by surgery with gastrotomy  recommend ppi bid  not candide for colon  consider filter

## 2024-05-27 NOTE — PROGRESS NOTE ADULT - ASSESSMENT
72 y/o M PMhx HTN, ESRD (on HD M/W/F), DM who presented with hiccups x 2 days and chest pain found to have peaked T waves. Hospital course c/b AMS s/p RRT on 5/1 and 5/2. Vascular consulted 2/2 RUE AVF access unable to be used s/p R femoral shiley placed for emergent HD. Transferred to MICU and intubated 5/2 for airway protection.   received empiric course of zosyn x 5 days, completed 5/7 and meropenem x 5 days, completed 5/14  B. cereus bacteremia, s/p vanc, completed 5/20    fever  restarted on zosyn  abd non-tender  pt denies  symptoms  possible RLL consolidation on CXR  afebrile x 24 hours  appreciate IR eval- HD prior to removal of non tunneled HD catheter w/ line holiday prior to new catheter being placed     AMS, CVA  TTE- no evidence of valvular disease  s/p LP 5/2, viral encephalitis/ meningitis ruled out  MRI- Punctate foci of restricted diffusion in the left talya and left cerebellum with associated T2 prolongation consistent with acute infarcts  s/p trach 5/14, s/p PEG 5/17    DVT  CT- Nonocclusive R common iliac vein deep venous thrombosis.    melena  drop in H/H  nurse reports melena    Recommendations  c/w zosyn  f/u blood cultures- NGTD  f/u resp culture GNR ID and sensitivities  trend temps, CBC  transfusion per ROSSY Torres M.D.  [facility], Division of Infectious Diseases  [phone]  After 5pm on weekdays and all day on weekends - please call [phone]

## 2024-05-27 NOTE — PROGRESS NOTE ADULT - SUBJECTIVE AND OBJECTIVE BOX
[facility], Division of Infectious Diseases  AUGUST Carbajal Y. Patel, S. Shah, G. Brian  [phone]  ([phone] - weekdays after 5pm and weekends)    Name: JIMMY BLAND  Age/Gender: 73y Male  MRN: [id number]    Interval History:  Notes reviewed.   No concerning overnight events.  Afebrile.   no with melena    Allergies: No Known Allergies      Objective:  Vitals:   T(F): 98.7 (05-27-24 @ 04:00), Max: 99.1 (05-26-24 @ 20:00)  HR: 58 (05-27-24 @ 04:13) (56 - 64)  BP: 170/58 (05-27-24 @ 04:00) (124/42 - 178/60)  RR: 12 (05-27-24 @ 04:00) (12 - 14)  SpO2: 100% (05-27-24 @ 04:13) (100% - 100%)  Physical Examination:  General: no acute distress  HEENT: anicteric, tracheostomy  Lungs: clear to auscultation anteriorly  Heart: S1, S2, normal rate, RIJ CVC  Abdomen: Soft. ND. NT  Extremities: No LE edema.   Skin: Warm. Dry.     Laboratory Studies:  CBC:                       8.4    8.62  )-----------( 375      ( 27 May 2024 05:45 )             26.3     WBC Trend:  8.62 05-27-24 @ 05:45  8.65 05-26-24 @ 21:09  9.28 05-26-24 @ 19:49  12.15 05-26-24 @ 08:38  12.60 05-26-24 @ 01:20  12.54 05-25-24 @ 18:45  11.35 05-25-24 @ 06:35  11.93 05-24-24 @ 03:30  11.06 05-23-24 @ 00:15  11.06 05-22-24 @ 07:25  9.71 05-22-24 @ 05:30  10.27 05-21-24 @ 05:25    CMP: 05-27    134<L>  |  93<L>  |  43<H>  ----------------------------<  135<H>  4.1   |  23  |  5.73<H>    Ca    8.2<L>      27 May 2024 05:45  Phos  3.3     05-26  Mg     2.10     05-27    TPro  5.6<L>  /  Alb  2.4<L>  /  TBili  0.2  /  DBili  x   /  AST  21  /  ALT  16  /  AlkPhos  104  05-26      LIVER FUNCTIONS - ( 26 May 2024 01:20 )  Alb: 2.4 g/dL / Pro: 5.6 g/dL / ALK PHOS: 104 U/L / ALT: 16 U/L / AST: 21 U/L / GGT: x             Urinalysis Basic - ( 27 May 2024 05:45 )    Color: x / Appearance: x / SG: x / pH: x  Gluc: 135 mg/dL / Ketone: x  / Bili: x / Urobili: x   Blood: x / Protein: x / Nitrite: x   Leuk Esterase: x / RBC: x / WBC x   Sq Epi: x / Non Sq Epi: x / Bacteria: x      Microbiology: reviewed     Culture - Sputum (collected 05-25-24 @ 21:28)  Source: Trach Asp Tracheal Aspirate  Gram Stain (05-26-24 @ 13:57):    Few polymorphonuclear leukocytes per low power field    Rare Gram Negative Rods per oil power field    Culture - Blood (collected 05-25-24 @ 19:00)  Source: .Blood Blood-Peripheral  Preliminary Report (05-26-24 @ 22:02):    No growth at 24 hours    Culture - Blood (collected 05-25-24 @ 18:45)  Source: .Blood Blood-Peripheral  Preliminary Report (05-26-24 @ 22:02):    No growth at 24 hours    Culture - Sputum (collected 05-18-24 @ 04:30)  Source: .Sputum Sputum  Gram Stain (05-18-24 @ 21:05):    Few polymorphonuclear leukocytes per low power field    No Squamous epithelial cells per low power field    No organisms seen per oil power field  Final Report (05-20-24 @ 07:02):    Normal Respiratory Jocelyn present    Culture - Blood (collected 05-18-24 @ 04:05)  Source: .Blood Blood-Venous  Final Report (05-23-24 @ 11:00):    No growth at 5 days    Culture - Blood (collected 05-18-24 @ 03:45)  Source: .Blood Blood-Venous  Final Report (05-23-24 @ 09:00):    No growth at 5 days        Radiology: reviewed     Medications:  acetaminophen   Oral Liquid .. 650 milliGRAM(s) Oral every 6 hours PRN  albuterol/ipratropium for Nebulization 3 milliLiter(s) Nebulizer every 12 hours  aspirin  chewable 81 milliGRAM(s) Oral daily  atorvastatin 20 milliGRAM(s) Oral at bedtime  carvedilol 12.5 milliGRAM(s) Oral every 12 hours  chlorhexidine 0.12% Liquid 15 milliLiter(s) Oral Mucosa every 12 hours  chlorhexidine 2% Cloths 1 Application(s) Topical <User Schedule>  dextrose 10% Bolus 125 milliLiter(s) IV Bolus once  dextrose 5%. 1000 milliLiter(s) IV Continuous <Continuous>  dextrose 5%. 1000 milliLiter(s) IV Continuous <Continuous>  dextrose 50% Injectable 12.5 Gram(s) IV Push once  dextrose 50% Injectable 25 Gram(s) IV Push once  dextrose Oral Gel 15 Gram(s) Oral once PRN  epoetin reilly (EPOGEN) Injectable 84781 Unit(s) IV Push <User Schedule>  ferrous    sulfate Liquid 300 milliGRAM(s) Enteral Tube daily  glucagon  Injectable 1 milliGRAM(s) IntraMuscular once  hydrALAZINE 100 milliGRAM(s) Oral three times a day  insulin lispro (ADMELOG) corrective regimen sliding scale   SubCutaneous every 6 hours  insulin NPH human recombinant 4 Unit(s) SubCutaneous every 6 hours  melatonin 3 milliGRAM(s) Oral at bedtime PRN  pantoprazole  Injectable 40 milliGRAM(s) IV Push every 12 hours  piperacillin/tazobactam IVPB.. 3.375 Gram(s) IV Intermittent every 12 hours  sodium bicarbonate 650 milliGRAM(s) Oral every 8 hours  sodium chloride 0.9% lock flush 10 milliLiter(s) IV Push every 1 hour PRN  sodium chloride 3%  Inhalation 4 milliLiter(s) Inhalation every 12 hours    Antimicrobials:  piperacillin/tazobactam IVPB.. 3.375 Gram(s) IV Intermittent every 12 hours

## 2024-05-27 NOTE — PROGRESS NOTE ADULT - ASSESSMENT
74 YO M with PMHx of ESRD on HD MWF, HTN, and 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 Baclofen started and course complicated by AMS second to baclofen toxicity requiring intubation and MICU transfer. Course further complicated by LEFT pontine and cerebellar stroke, R AVF stenosis, aspiration and B Cereus bacteremia and RLE DVT. s/p trach and transferred to RCU 5/16    NEUROLOGY  # AMS   - MRI HEAD (5/6) with punctate foci in the left talya and left cerebellum with concern for acute infarct.   - MRA HEAD and NECK (5/6) with no large vessel occlusion or stenosis.  - Continue on aspirin and hold DAPT for now pending procedures   - Continue on Lipitor for medical management   - Supportive care     CARDIOVASCULAR  # CP with NSTEMI vs demand ischemia with HTN   - Admitted for CP and HTN with peaked T WAVES and ECG with ST deviation on admission   - Troponins elevated on admission with negative CKMB   - TTE (4/30) with EF 72, normal LVRVSF, and no regional wall motion abnormalities   - Case discussed with cardiology and no acute need for cath. DAPT loaded on 5/4 and plan for medical management with ASA, lipitor and heparin GTT.     # Septic vs vasoplegic shock  # Hx of HTN   - Home BP medications held and pressors weaned off   - Continue on coreg 12.5 and hydralazine 100 TID   - Monitor BP with goal 150/90s    RESPIRATORY  # Respiratory failure   - AMS noted with baclofen toxicity requiring intubation   - Attempted extubation in MICU however course complicated by aspiration and prolonged course and now s/p tracheostomy with IP on 5/14  - Continue on vent 12/500/5/30 and able to tolerate SBT 10/5 x several hours but limited by weakness   - Continue on nebs and HTS Q12H for now     GI  # Dysphagia   - s/p surgical PEG 5/17   - Continue on tube feeds via PEG     RENAL  # ESRD on HD MWF with R BCF  # Fistula stenosis ?  - VA AVF (5/2) with Right radiocephalic arteriovenous fistula has no hemodynamically significant stenosis present at the anastomotic site ( cm/sec, EDV 49 cm/sec). The usable segment is partially thrombosed with minimal patency.  - Required R FEMORAL line on medicine, then removed on 5/2, and replaced with R IJ SHILEY on 5/3 for CRRT then converted back to iHD MWF   - R SHILEY removed on 5/10 second to bacteremia and replaced on 5/13   - Continue on HD MWF per Renal   - Continue on Renvela 1600   - Continue on HCO3 650 tabs   - Plan for Fistulogram still pending    INFECTIOUS DISEASE  # Aspiration   - Recurrent fever spike and CXR with RLL opacity   - BCx and SCx negative, however fever spikes improved on ABX   - Initially planned for Zosyn empirically (5/18 - 5/27) x 10 day course however given new fever with new L-sided haziness poss layering PLEF (5/25) will extend course (5/18 - )   - F/u RPT BCx, SCx sent    # B Cereus Bacteremia   - Course complicated by fevers and aspiration event   - MRSA (5/1) negative   - BCx (5/2) negative   - SCx (5/4) and BAL (5/12) negative   - BCx (5/10) with bacillus cereus and cleared on (5/12).   - Case discussed with ID and s/p Vancomycin through 5/21 x 10 day course.     HEME  # AOCD with ESRD   - Anemia panel with AOCD and low # sat   - Transfused on 5/16   - EPO 10K continued on TIW   - Ferrous supplement added   - Monitor HH     VASCULAR   # RLE DVT   - CT AP (5/12) with nonocclusive RIGHT common iliac vein deep venous thrombosis  - Continue on heparin GTT pending fistulogram   - RLE precautions     ENDOCRINE  # IDDM2 A1C 6.9  - Continue on NPH 4U q 6 and ISS   - Monitor and adjust insulin based on FS     SKIN  - R FEMORAL (5/1-5/2)   - R IJ SHILEY (5/3-5/10)   - R IJ SHILEY (5/13 - )     ETHICS/ GOC    - FULL CODE     DISPO - Vent facility    74 YO M with PMHx of ESRD on HD MWF, HTN, and 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 Baclofen started and course complicated by AMS second to baclofen toxicity requiring intubation and MICU transfer. Course further complicated by LEFT pontine and cerebellar stroke, R AVF stenosis, aspiration and B Cereus bacteremia and RLE DVT. s/p trach and transferred to RCU 5/16    NEUROLOGY  # AMS   - MRI HEAD (5/6) with punctate foci in the left talya and left cerebellum with concern for acute infarct.   - MRA HEAD and NECK (5/6) with no large vessel occlusion or stenosis.  - Continue on aspirin and hold DAPT for now pending procedures   - Continue on Lipitor for medical management   - Supportive care     CARDIOVASCULAR  # CP with NSTEMI vs demand ischemia with HTN   - Admitted for CP and HTN with peaked T WAVES and ECG with ST deviation on admission   - Troponins elevated on admission with negative CKMB   - TTE (4/30) with EF 72, normal LVRVSF, and no regional wall motion abnormalities   - Case discussed with cardiology and no acute need for cath. DAPT loaded on 5/4 and plan for medical management with ASA, lipitor and heparin GTT.   - Hold Heparin GTT in s/o GIB as below (5/27)    # Septic vs vasoplegic shock  # Hx of HTN   - Home BP medications held and pressors weaned off   - Continue on coreg 12.5 and hydralazine 100 TID   - Monitor BP with goal 150/90s    RESPIRATORY  # Respiratory failure   - AMS noted with baclofen toxicity requiring intubation   - Attempted extubation in MICU however course complicated by aspiration and prolonged course and now s/p tracheostomy with IP on 5/14  - Continue on vent 12/500/5/30 and able to tolerate SBT 10/5 x several hours but limited by weakness   - Continue on nebs and HTS Q12H for now     GI  # Dysphagia   - s/p surgical PEG 5/17   - Continue on tube feeds via PEG     # GIB  - Noted with several episodes of black stool with drop in Hgb (5/26 overnight) requiring PRBC with appropraite response  - GI following, no plan for scope at this time  - C/w PPI BID & restart feeds at TRICKLE rate  - C/w HOLDING HEPARIN GTT  - CBC BID    RENAL  # ESRD on HD MWF with R BCF  # Fistula stenosis ?  - VA AVF (5/2) with Right radiocephalic arteriovenous fistula has no hemodynamically significant stenosis present at the anastomotic site ( cm/sec, EDV 49 cm/sec). The usable segment is partially thrombosed with minimal patency.  - Required R FEMORAL line on medicine, then removed on 5/2, and replaced with R IJ SHILEY on 5/3 for CRRT then converted back to iHD MWF   - R SHILEY removed on 5/10 second to bacteremia and replaced on 5/13   - Continue on HD MWF per Renal   - Continue on Renvela 1600   - Continue on HCO3 650 tabs   - RIJ Shiley removed 5/27 for LINE HOLIDAY in s/o new fever pending NEG BCx   - Plan for Fistulogram still pending    INFECTIOUS DISEASE  # Aspiration   - Recurrent fever spike and CXR with RLL opacity   - BCx and SCx negative, however fever spikes improved on ABX   - Initially planned for Zosyn empirically (5/18 - 5/27) x 10 day course however given new fever with new L-sided haziness poss layering PLEF (5/25) will extend course (5/18 - )   - F/u RPT BCx, SCx sent    # B Cereus Bacteremia   - Course complicated by fevers and aspiration event   - MRSA (5/1) negative   - BCx (5/2) negative   - SCx (5/4) and BAL (5/12) negative   - BCx (5/10) with bacillus cereus and cleared on (5/12).   - Case discussed with ID and s/p Vancomycin through 5/21 x 10 day course.     HEME  # AOCD with ESRD   - Anemia panel with AOCD and low # sat   - Transfused on 5/16   - EPO 10K continued on TIW   - Ferrous supplement added   - Monitor HH     VASCULAR   # RLE DVT   - CT AP (5/12) with nonocclusive RIGHT common iliac vein deep venous thrombosis  - Initially started on a Heparin GTT with course c/b GIB so now heparin on hold (5/27)   - RLE precautions   - IR consulted both for Shiley replacement post line holiday & for IVC filter placement    ENDOCRINE  # IDDM2 A1C 6.9  - Continue on NPH 4U q 6 and ISS - reduced to 2u while on TRICKLE feeds (5/27)  - Monitor and adjust insulin based on FS     SKIN  - R FEMORAL (5/1-5/2)   - R IJ SHILEY (5/3-5/10)   - R IJ SHILEY (5/13 - 5/27)     ETHICS/ GOC    - FULL CODE     DISPO - Vent facility

## 2024-05-27 NOTE — PROGRESS NOTE ADULT - SUBJECTIVE AND OBJECTIVE BOX
Patient is a 73y old  Male who presents with a chief complaint of Unstable angina, abn EKG (27 May 2024 14:21)      SUBJECTIVE / OVERNIGHT EVENTS: afebrile, fistulogram pending    MEDICATIONS  (STANDING):  albuterol/ipratropium for Nebulization 3 milliLiter(s) Nebulizer every 12 hours  atorvastatin 20 milliGRAM(s) Oral at bedtime  carvedilol 12.5 milliGRAM(s) Oral every 12 hours  chlorhexidine 0.12% Liquid 15 milliLiter(s) Oral Mucosa every 12 hours  chlorhexidine 2% Cloths 1 Application(s) Topical <User Schedule>  dextrose 10% Bolus 125 milliLiter(s) IV Bolus once  dextrose 5%. 1000 milliLiter(s) (50 mL/Hr) IV Continuous <Continuous>  dextrose 5%. 1000 milliLiter(s) (100 mL/Hr) IV Continuous <Continuous>  dextrose 50% Injectable 12.5 Gram(s) IV Push once  dextrose 50% Injectable 25 Gram(s) IV Push once  epoetin reilly (EPOGEN) Injectable 51385 Unit(s) IV Push <User Schedule>  ferrous    sulfate Liquid 300 milliGRAM(s) Enteral Tube daily  glucagon  Injectable 1 milliGRAM(s) IntraMuscular once  hydrALAZINE 100 milliGRAM(s) Oral three times a day  insulin lispro (ADMELOG) corrective regimen sliding scale   SubCutaneous every 6 hours  insulin NPH human recombinant 2 Unit(s) SubCutaneous every 6 hours  pantoprazole  Injectable 40 milliGRAM(s) IV Push every 12 hours  piperacillin/tazobactam IVPB.. 3.375 Gram(s) IV Intermittent every 12 hours  sodium bicarbonate 650 milliGRAM(s) Oral every 8 hours  sodium chloride 3%  Inhalation 4 milliLiter(s) Inhalation every 12 hours    MEDICATIONS  (PRN):  acetaminophen   Oral Liquid .. 650 milliGRAM(s) Oral every 6 hours PRN Temp greater or equal to 38C (100.4F), Moderate Pain (4 - 6)  dextrose Oral Gel 15 Gram(s) Oral once PRN Blood Glucose LESS THAN 70 milliGRAM(s)/deciliter  melatonin 3 milliGRAM(s) Oral at bedtime PRN Insomnia  sodium chloride 0.9% lock flush 10 milliLiter(s) IV Push every 1 hour PRN Pre/post blood products, medications, blood draw, and to maintain line patency      Vital Signs Last 24 Hrs  T(F): 99.4 (05-27-24 @ 16:00), Max: 99.4 (05-27-24 @ 16:00)  HR: 74 (05-27-24 @ 19:31) (56 - 79)  BP: 169/71 (05-27-24 @ 16:00) (155/58 - 208/55)  RR: 22 (05-27-24 @ 16:00) (12 - 22)  SpO2: 100% (05-27-24 @ 19:31) (100% - 100%)  Telemetry:   CAPILLARY BLOOD GLUCOSE      POCT Blood Glucose.: 132 mg/dL (27 May 2024 17:19)  POCT Blood Glucose.: 101 mg/dL (27 May 2024 11:13)  POCT Blood Glucose.: 151 mg/dL (27 May 2024 05:14)  POCT Blood Glucose.: 191 mg/dL (26 May 2024 23:38)    I&O's Summary    27 May 2024 07:01  -  27 May 2024 22:23  --------------------------------------------------------  IN: 400 mL / OUT: 2400 mL / NET: -2000 mL        PHYSICAL EXAM:  GENERAL: NAD, well-developed  HEAD:  Atraumatic, Normocephalic  EYES: EOMI, PERRLA, conjunctiva and sclera clear  NECK: Supple, No JVD  CHEST/LUNG: Clear to auscultation bilaterally; No wheeze  HEART: Regular rate and rhythm; No murmurs, rubs, or gallops  ABDOMEN: Soft, Nontender, Nondistended; Bowel sounds present  EXTREMITIES:  2+ Peripheral Pulses, No clubbing, cyanosis, or edema  PSYCH: AAOx3  NEUROLOGY: non-focal  SKIN: No rashes or lesions    LABS:                        7.8    7.04  )-----------( 375      ( 27 May 2024 19:22 )             23.9     05-27    134<L>  |  93<L>  |  43<H>  ----------------------------<  135<H>  4.1   |  23  |  5.73<H>    Ca    8.2<L>      27 May 2024 05:45  Phos  4.7     05-27  Mg     2.10     05-27    TPro  5.6<L>  /  Alb  2.4<L>  /  TBili  0.2  /  DBili  x   /  AST  21  /  ALT  16  /  AlkPhos  104  05-26    PT/INR - ( 26 May 2024 01:20 )   PT: 15.5 sec;   INR: 1.40 ratio         PTT - ( 26 May 2024 15:33 )  PTT:77.0 sec      Urinalysis Basic - ( 27 May 2024 05:45 )    Color: x / Appearance: x / SG: x / pH: x  Gluc: 135 mg/dL / Ketone: x  / Bili: x / Urobili: x   Blood: x / Protein: x / Nitrite: x   Leuk Esterase: x / RBC: x / WBC x   Sq Epi: x / Non Sq Epi: x / Bacteria: x        RADIOLOGY & ADDITIONAL TESTS:    Imaging Personally Reviewed:    Consultant(s) Notes Reviewed:      Care Discussed with Consultants/Other Providers:

## 2024-05-27 NOTE — PROGRESS NOTE ADULT - SUBJECTIVE AND OBJECTIVE BOX
CHIEF COMPLAINT:    INTERVAL EVENTS:     ROS: Seen by bedside during AM rounds     OBJECTIVE:  ICU Vital Signs Last 24 Hrs  T(C): 37.1 (27 May 2024 04:00), Max: 37.3 (26 May 2024 20:00)  T(F): 98.7 (27 May 2024 04:00), Max: 99.1 (26 May 2024 20:00)  HR: 57 (27 May 2024 07:36) (56 - 64)  BP: 170/58 (27 May 2024 04:00) (124/42 - 178/60)  BP(mean): --  ABP: --  ABP(mean): --  RR: 12 (27 May 2024 04:00) (12 - 13)  SpO2: 100% (27 May 2024 07:36) (100% - 100%)    O2 Parameters below as of 27 May 2024 04:00  Patient On (Oxygen Delivery Method): ventilator    O2 Concentration (%): 30      Mode: AC/ CMV (Assist Control/ Continuous Mandatory Ventilation), RR (machine): 12, TV (machine): 500, FiO2: 40, PEEP: 5, ITime: 0.8, MAP: 8, PIP: 21    CAPILLARY BLOOD GLUCOSE      POCT Blood Glucose.: 151 mg/dL (27 May 2024 05:14)      PHYSICAL EXAM:  General:   HEENT:   Lymph Nodes:  Neck:   Respiratory:   Cardiovascular:   Abdomen:   Extremities:   Skin:   Neurological:  Psychiatry:    Mode: AC/ CMV (Assist Control/ Continuous Mandatory Ventilation)  RR (machine): 12  TV (machine): 500  FiO2: 40  PEEP: 5  ITime: 0.8  MAP: 8  PIP: 21      HOSPITAL MEDICATIONS:  MEDICATIONS  (STANDING):  albuterol/ipratropium for Nebulization 3 milliLiter(s) Nebulizer every 12 hours  atorvastatin 20 milliGRAM(s) Oral at bedtime  carvedilol 12.5 milliGRAM(s) Oral every 12 hours  chlorhexidine 0.12% Liquid 15 milliLiter(s) Oral Mucosa every 12 hours  chlorhexidine 2% Cloths 1 Application(s) Topical <User Schedule>  dextrose 10% Bolus 125 milliLiter(s) IV Bolus once  dextrose 5%. 1000 milliLiter(s) (50 mL/Hr) IV Continuous <Continuous>  dextrose 5%. 1000 milliLiter(s) (100 mL/Hr) IV Continuous <Continuous>  dextrose 50% Injectable 12.5 Gram(s) IV Push once  dextrose 50% Injectable 25 Gram(s) IV Push once  epoetin reilly (EPOGEN) Injectable 33331 Unit(s) IV Push <User Schedule>  ferrous    sulfate Liquid 300 milliGRAM(s) Enteral Tube daily  glucagon  Injectable 1 milliGRAM(s) IntraMuscular once  hydrALAZINE 100 milliGRAM(s) Oral three times a day  insulin lispro (ADMELOG) corrective regimen sliding scale   SubCutaneous every 6 hours  insulin NPH human recombinant 4 Unit(s) SubCutaneous every 6 hours  pantoprazole  Injectable 40 milliGRAM(s) IV Push every 12 hours  piperacillin/tazobactam IVPB.. 3.375 Gram(s) IV Intermittent every 12 hours  sodium bicarbonate 650 milliGRAM(s) Oral every 8 hours  sodium chloride 3%  Inhalation 4 milliLiter(s) Inhalation every 12 hours    MEDICATIONS  (PRN):  acetaminophen   Oral Liquid .. 650 milliGRAM(s) Oral every 6 hours PRN Temp greater or equal to 38C (100.4F), Moderate Pain (4 - 6)  dextrose Oral Gel 15 Gram(s) Oral once PRN Blood Glucose LESS THAN 70 milliGRAM(s)/deciliter  melatonin 3 milliGRAM(s) Oral at bedtime PRN Insomnia  sodium chloride 0.9% lock flush 10 milliLiter(s) IV Push every 1 hour PRN Pre/post blood products, medications, blood draw, and to maintain line patency      LABS:                        8.4    8.62  )-----------( 375      ( 27 May 2024 05:45 )             26.3     05-27    134<L>  |  93<L>  |  43<H>  ----------------------------<  135<H>  4.1   |  23  |  5.73<H>    Ca    8.2<L>      27 May 2024 05:45  Phos  4.7     05-27  Mg     2.10     05-27    TPro  5.6<L>  /  Alb  2.4<L>  /  TBili  0.2  /  DBili  x   /  AST  21  /  ALT  16  /  AlkPhos  104  05-26    PT/INR - ( 26 May 2024 01:20 )   PT: 15.5 sec;   INR: 1.40 ratio         PTT - ( 26 May 2024 15:33 )  PTT:77.0 sec  Urinalysis Basic - ( 27 May 2024 05:45 )    Color: x / Appearance: x / SG: x / pH: x  Gluc: 135 mg/dL / Ketone: x  / Bili: x / Urobili: x   Blood: x / Protein: x / Nitrite: x   Leuk Esterase: x / RBC: x / WBC x   Sq Epi: x / Non Sq Epi: x / Bacteria: x        Venous Blood Gas:  05-25 @ 18:45  7.44/40/43/27/70.9  VBG Lactate: 1.4   CHIEF COMPLAINT:Patient is a 73y old  Male who presents with a chief complaint of Unstable angina, abn EKG (27 May 2024 08:54)      INTERVAL EVENTS:     ROS: Seen by bedside during AM rounds     OBJECTIVE:  ICU Vital Signs Last 24 Hrs  T(C): 37.1 (27 May 2024 04:00), Max: 37.3 (26 May 2024 20:00)  T(F): 98.7 (27 May 2024 04:00), Max: 99.1 (26 May 2024 20:00)  HR: 57 (27 May 2024 07:36) (56 - 64)  BP: 170/58 (27 May 2024 04:00) (124/42 - 178/60)  BP(mean): --  ABP: --  ABP(mean): --  RR: 12 (27 May 2024 04:00) (12 - 13)  SpO2: 100% (27 May 2024 07:36) (100% - 100%)    O2 Parameters below as of 27 May 2024 04:00  Patient On (Oxygen Delivery Method): ventilator    O2 Concentration (%): 30      Mode: AC/ CMV (Assist Control/ Continuous Mandatory Ventilation), RR (machine): 12, TV (machine): 500, FiO2: 40, PEEP: 5, ITime: 0.8, MAP: 8, PIP: 21    CAPILLARY BLOOD GLUCOSE      POCT Blood Glucose.: 151 mg/dL (27 May 2024 05:14)      PHYSICAL EXAM:  General: NAD   Neck: (+) Trach tube noted, site c/d/i. (+)RIJ Shiley in neck, site c/d/i.   Cards: S1/S2, no murmurs   Pulm: CTA bilaterally. No wheezes.   Abdomen: Soft, NTND. (+) PEG noted, site c/d/i.   Extremities: No pedal edema. Extremities warm to touch.  Neurology: Nonverbal but communicates via writing. Oriented x3. Follows basic commands. Tracks. 5/5 motor strength x4E.   Psych: Still appears somewhat withdrawn. Intermittently refusing care per nursing.   Skin: warm to touch, color appropriate for ethnicity. Refer to RN assessment for further details.    Mode: AC/ CMV (Assist Control/ Continuous Mandatory Ventilation)  RR (machine): 12  TV (machine): 500  FiO2: 40  PEEP: 5  ITime: 0.8  MAP: 8  PIP: 21      HOSPITAL MEDICATIONS:  MEDICATIONS  (STANDING):  albuterol/ipratropium for Nebulization 3 milliLiter(s) Nebulizer every 12 hours  atorvastatin 20 milliGRAM(s) Oral at bedtime  carvedilol 12.5 milliGRAM(s) Oral every 12 hours  chlorhexidine 0.12% Liquid 15 milliLiter(s) Oral Mucosa every 12 hours  chlorhexidine 2% Cloths 1 Application(s) Topical <User Schedule>  dextrose 10% Bolus 125 milliLiter(s) IV Bolus once  dextrose 5%. 1000 milliLiter(s) (50 mL/Hr) IV Continuous <Continuous>  dextrose 5%. 1000 milliLiter(s) (100 mL/Hr) IV Continuous <Continuous>  dextrose 50% Injectable 12.5 Gram(s) IV Push once  dextrose 50% Injectable 25 Gram(s) IV Push once  epoetin reilly (EPOGEN) Injectable 59583 Unit(s) IV Push <User Schedule>  ferrous    sulfate Liquid 300 milliGRAM(s) Enteral Tube daily  glucagon  Injectable 1 milliGRAM(s) IntraMuscular once  hydrALAZINE 100 milliGRAM(s) Oral three times a day  insulin lispro (ADMELOG) corrective regimen sliding scale   SubCutaneous every 6 hours  insulin NPH human recombinant 4 Unit(s) SubCutaneous every 6 hours  pantoprazole  Injectable 40 milliGRAM(s) IV Push every 12 hours  piperacillin/tazobactam IVPB.. 3.375 Gram(s) IV Intermittent every 12 hours  sodium bicarbonate 650 milliGRAM(s) Oral every 8 hours  sodium chloride 3%  Inhalation 4 milliLiter(s) Inhalation every 12 hours    MEDICATIONS  (PRN):  acetaminophen   Oral Liquid .. 650 milliGRAM(s) Oral every 6 hours PRN Temp greater or equal to 38C (100.4F), Moderate Pain (4 - 6)  dextrose Oral Gel 15 Gram(s) Oral once PRN Blood Glucose LESS THAN 70 milliGRAM(s)/deciliter  melatonin 3 milliGRAM(s) Oral at bedtime PRN Insomnia  sodium chloride 0.9% lock flush 10 milliLiter(s) IV Push every 1 hour PRN Pre/post blood products, medications, blood draw, and to maintain line patency      LABS:                        8.4    8.62  )-----------( 375      ( 27 May 2024 05:45 )             26.3     05-27    134<L>  |  93<L>  |  43<H>  ----------------------------<  135<H>  4.1   |  23  |  5.73<H>    Ca    8.2<L>      27 May 2024 05:45  Phos  4.7     05-27  Mg     2.10     05-27    TPro  5.6<L>  /  Alb  2.4<L>  /  TBili  0.2  /  DBili  x   /  AST  21  /  ALT  16  /  AlkPhos  104  05-26    PT/INR - ( 26 May 2024 01:20 )   PT: 15.5 sec;   INR: 1.40 ratio         PTT - ( 26 May 2024 15:33 )  PTT:77.0 sec  Urinalysis Basic - ( 27 May 2024 05:45 )    Color: x / Appearance: x / SG: x / pH: x  Gluc: 135 mg/dL / Ketone: x  / Bili: x / Urobili: x   Blood: x / Protein: x / Nitrite: x   Leuk Esterase: x / RBC: x / WBC x   Sq Epi: x / Non Sq Epi: x / Bacteria: x        Venous Blood Gas:  05-25 @ 18:45  7.44/40/43/27/70.9  VBG Lactate: 1.4   CHIEF COMPLAINT:Patient is a 73y old  Male who presents with a chief complaint of Unstable angina, abn EKG (27 May 2024 08:54)      INTERVAL EVENTS: episodes of melena with drop in Hgb overnight requiring PRBC. GI note appreciated, deferring scope for now. Hgb responded appropriately. Holding AC. Started PPI BID. Will trend CBC closelyi.     ROS: Seen by bedside during AM rounds     OBJECTIVE:  ICU Vital Signs Last 24 Hrs  T(C): 37.1 (27 May 2024 04:00), Max: 37.3 (26 May 2024 20:00)  T(F): 98.7 (27 May 2024 04:00), Max: 99.1 (26 May 2024 20:00)  HR: 57 (27 May 2024 07:36) (56 - 64)  BP: 170/58 (27 May 2024 04:00) (124/42 - 178/60)  BP(mean): --  ABP: --  ABP(mean): --  RR: 12 (27 May 2024 04:00) (12 - 13)  SpO2: 100% (27 May 2024 07:36) (100% - 100%)    O2 Parameters below as of 27 May 2024 04:00  Patient On (Oxygen Delivery Method): ventilator    O2 Concentration (%): 30      Mode: AC/ CMV (Assist Control/ Continuous Mandatory Ventilation), RR (machine): 12, TV (machine): 500, FiO2: 40, PEEP: 5, ITime: 0.8, MAP: 8, PIP: 21    CAPILLARY BLOOD GLUCOSE      POCT Blood Glucose.: 151 mg/dL (27 May 2024 05:14)      PHYSICAL EXAM:  General: NAD   Neck: (+) Trach tube noted, site c/d/i. (+)RIJ Shiley in neck, site c/d/i.   Cards: S1/S2, no murmurs   Pulm: CTA bilaterally. No wheezes.   Abdomen: Soft, NTND. (+) PEG noted, site c/d/i.   Extremities: No pedal edema. Extremities warm to touch.  Neurology: Nonverbal but communicates via writing. Oriented x3. Follows basic commands. Tracks. 5/5 motor strength x4E.   Psych: Still appears somewhat withdrawn. Intermittently refusing care per nursing.   Skin: warm to touch, color appropriate for ethnicity. Refer to RN assessment for further details.    Mode: AC/ CMV (Assist Control/ Continuous Mandatory Ventilation)  RR (machine): 12  TV (machine): 500  FiO2: 40  PEEP: 5  ITime: 0.8  MAP: 8  PIP: 21      HOSPITAL MEDICATIONS:  MEDICATIONS  (STANDING):  albuterol/ipratropium for Nebulization 3 milliLiter(s) Nebulizer every 12 hours  atorvastatin 20 milliGRAM(s) Oral at bedtime  carvedilol 12.5 milliGRAM(s) Oral every 12 hours  chlorhexidine 0.12% Liquid 15 milliLiter(s) Oral Mucosa every 12 hours  chlorhexidine 2% Cloths 1 Application(s) Topical <User Schedule>  dextrose 10% Bolus 125 milliLiter(s) IV Bolus once  dextrose 5%. 1000 milliLiter(s) (50 mL/Hr) IV Continuous <Continuous>  dextrose 5%. 1000 milliLiter(s) (100 mL/Hr) IV Continuous <Continuous>  dextrose 50% Injectable 12.5 Gram(s) IV Push once  dextrose 50% Injectable 25 Gram(s) IV Push once  epoetin reilly (EPOGEN) Injectable 67842 Unit(s) IV Push <User Schedule>  ferrous    sulfate Liquid 300 milliGRAM(s) Enteral Tube daily  glucagon  Injectable 1 milliGRAM(s) IntraMuscular once  hydrALAZINE 100 milliGRAM(s) Oral three times a day  insulin lispro (ADMELOG) corrective regimen sliding scale   SubCutaneous every 6 hours  insulin NPH human recombinant 4 Unit(s) SubCutaneous every 6 hours  pantoprazole  Injectable 40 milliGRAM(s) IV Push every 12 hours  piperacillin/tazobactam IVPB.. 3.375 Gram(s) IV Intermittent every 12 hours  sodium bicarbonate 650 milliGRAM(s) Oral every 8 hours  sodium chloride 3%  Inhalation 4 milliLiter(s) Inhalation every 12 hours    MEDICATIONS  (PRN):  acetaminophen   Oral Liquid .. 650 milliGRAM(s) Oral every 6 hours PRN Temp greater or equal to 38C (100.4F), Moderate Pain (4 - 6)  dextrose Oral Gel 15 Gram(s) Oral once PRN Blood Glucose LESS THAN 70 milliGRAM(s)/deciliter  melatonin 3 milliGRAM(s) Oral at bedtime PRN Insomnia  sodium chloride 0.9% lock flush 10 milliLiter(s) IV Push every 1 hour PRN Pre/post blood products, medications, blood draw, and to maintain line patency      LABS:                        8.4    8.62  )-----------( 375      ( 27 May 2024 05:45 )             26.3     05-27    134<L>  |  93<L>  |  43<H>  ----------------------------<  135<H>  4.1   |  23  |  5.73<H>    Ca    8.2<L>      27 May 2024 05:45  Phos  4.7     05-27  Mg     2.10     05-27    TPro  5.6<L>  /  Alb  2.4<L>  /  TBili  0.2  /  DBili  x   /  AST  21  /  ALT  16  /  AlkPhos  104  05-26    PT/INR - ( 26 May 2024 01:20 )   PT: 15.5 sec;   INR: 1.40 ratio         PTT - ( 26 May 2024 15:33 )  PTT:77.0 sec  Urinalysis Basic - ( 27 May 2024 05:45 )    Color: x / Appearance: x / SG: x / pH: x  Gluc: 135 mg/dL / Ketone: x  / Bili: x / Urobili: x   Blood: x / Protein: x / Nitrite: x   Leuk Esterase: x / RBC: x / WBC x   Sq Epi: x / Non Sq Epi: x / Bacteria: x        Venous Blood Gas:  05-25 @ 18:45  7.44/40/43/27/70.9  VBG Lactate: 1.4

## 2024-05-27 NOTE — PROGRESS NOTE ADULT - ASSESSMENT
73M PMHx HTN, ESRD, IDDM p/w hiccups and started on baclofen with concern for AMS overnight and desaturation, ?cheye nascimento respirations. Labs with numerous metabolic derangements. CTH with bifrontal encephalomalacia, likley traumatic.   WBC inc significantly, now intubated. Exam limited as on propofol, also on pressors.   s/p LP for meningitis concerns however appears bland - not on antibiotics  EEG with GPDs, no seizures  MR brain with punctuate L pontine and L cerebellar infarcts  MRA H/N with mild basilar fenestration, otherwise neg  mental status improving post extubation but now reintubated for recurrent aspiration   s/p trach, neurologically improving  o/e with R hemiparesis, mental status markedly improved    AMS of unclear etiology - ? baclofen toxicity, no evidence of seizures. 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 - embolic appearing  Intractable hiccups  hypoxic resp failure  ESRD on HD  B cereus bacteremia, likely contaminant  Nonocclusive R common iliac DVT    MRI, MRA reviewed, as above - R hemiparesis on exam likely related to known L sided strokes - previously giving poor effort on exam   cont aspirin 81mg  TTE reviewed, no need to repeat for now  Keppra started for GPDs, no improvement in mental status - now better off keppra  continue to monitor off Keppra, avoid baclofen and reglan  s/p trach, peg   HD per nephro  f/u remainder of CSF - negative  s/p Zosyn, Meropenem for pna   Abx per ID  on hep gtt for DVT  fistulogam per vascular  respeat infectious w/u per ID

## 2024-05-27 NOTE — PROGRESS NOTE ADULT - SUBJECTIVE AND OBJECTIVE BOX
[facility]   MD ELIANE BHAGAT MD RUORU WONG, PA CHEN QIAN NP    TEL:  OFFICE: [phone]      RENAL FOLLOW UP NOTE-- Date of Service ;; 05-27-24 @ 14:21  --------------------------------------------------------------------------------  HPI:  Pt seen and examined at bedside  Denies SOB, chest pain.         PAST HISTORY  --------------------------------------------------------------------------------  No significant changes to PMH, PSH, FHx, SHx, unless otherwise noted    ALLERGIES & MEDICATIONS  --------------------------------------------------------------------------------  Allergies    No Known Allergies    Intolerances      Standing Inpatient Medications  albuterol/ipratropium for Nebulization 3 milliLiter(s) Nebulizer every 12 hours  atorvastatin 20 milliGRAM(s) Oral at bedtime  carvedilol 12.5 milliGRAM(s) Oral every 12 hours  chlorhexidine 0.12% Liquid 15 milliLiter(s) Oral Mucosa every 12 hours  chlorhexidine 2% Cloths 1 Application(s) Topical <User Schedule>  dextrose 10% Bolus 125 milliLiter(s) IV Bolus once  dextrose 5%. 1000 milliLiter(s) IV Continuous <Continuous>  dextrose 5%. 1000 milliLiter(s) IV Continuous <Continuous>  dextrose 50% Injectable 25 Gram(s) IV Push once  dextrose 50% Injectable 12.5 Gram(s) IV Push once  epoetin reilly (EPOGEN) Injectable 83635 Unit(s) IV Push <User Schedule>  ferrous    sulfate Liquid 300 milliGRAM(s) Enteral Tube daily  glucagon  Injectable 1 milliGRAM(s) IntraMuscular once  hydrALAZINE 100 milliGRAM(s) Oral three times a day  insulin lispro (ADMELOG) corrective regimen sliding scale   SubCutaneous every 6 hours  insulin NPH human recombinant 4 Unit(s) SubCutaneous every 6 hours  pantoprazole  Injectable 40 milliGRAM(s) IV Push every 12 hours  piperacillin/tazobactam IVPB.. 3.375 Gram(s) IV Intermittent every 12 hours  sodium bicarbonate 650 milliGRAM(s) Oral every 8 hours  sodium chloride 3%  Inhalation 4 milliLiter(s) Inhalation every 12 hours    PRN Inpatient Medications  acetaminophen   Oral Liquid .. 650 milliGRAM(s) Oral every 6 hours PRN  dextrose Oral Gel 15 Gram(s) Oral once PRN  melatonin 3 milliGRAM(s) Oral at bedtime PRN  sodium chloride 0.9% lock flush 10 milliLiter(s) IV Push every 1 hour PRN      REVIEW OF SYSTEMS  --------------------------------------------------------------------------------  General: no fever  CVS: no chest pain  RESP: no sob, no cough  ABD: no abdominal pain  : no dysuria,  MSK: no edema     VITALS/PHYSICAL EXAM  --------------------------------------------------------------------------------  T(C): 37.2 (05-27-24 @ 12:30), Max: 37.3 (05-26-24 @ 20:00)  HR: 78 (05-27-24 @ 12:30) (56 - 78)  BP: 166/55 (05-27-24 @ 12:30) (124/42 - 208/55)  RR: 22 (05-27-24 @ 12:30) (12 - 22)  SpO2: 100% (05-27-24 @ 10:47) (100% - 100%)  Wt(kg): --        05-27-24 @ 07:01  -  05-27-24 @ 14:21  --------------------------------------------------------  IN: 400 mL / OUT: 2400 mL / NET: -2000 mL      Physical Exam:  	Gen: NAD  	HEENT: MMM  	Pulm: CTA B/L. Trach to vent  	CV: S1S2  	Abd: Soft, +BS, GT feed  	Ext: No LE edema B/L                      Neuro: Awake , alert  	Skin: Warm and Dry   	Vascular access: [facility]            LABS/STUDIES  --------------------------------------------------------------------------------              8.4    8.62  >-----------<  375      [05-27-24 @ 05:45]              26.3     134  |  93  |  43  ----------------------------<  135      [05-27-24 @ 05:45]  4.1   |  23  |  5.73        Ca     8.2     [05-27-24 @ 05:45]      Mg     2.10     [05-27-24 @ 05:45]      Phos  4.7     [05-27-24 @ 05:45]    TPro  5.6  /  Alb  2.4  /  TBili  0.2  /  DBili  x   /  AST  21  /  ALT  16  /  AlkPhos  104  [05-26-24 @ 01:20]    PT/INR: PT 15.5 , INR 1.40       [05-26-24 @ 01:20]  PTT: 77.0       [05-26-24 @ 15:33]      Creatinine Trend:  SCr 5.73 [05-27 @ 05:45]  SCr 4.48 [05-26 @ 01:20]  SCr 3.16 [05-25 @ 06:35]  SCr 4.64 [05-24 @ 03:30]  SCr 3.29 [05-23 @ 00:15]    Urinalysis - [05-27-24 @ 05:45]      Color  / Appearance  / SG  / pH       Gluc 135 / Ketone   / Bili  / Urobili        Blood  / Protein  / Leuk Est  / Nitrite       RBC  / WBC  / Hyaline  / Gran  / Sq Epi  / Non Sq Epi  / Bacteria       Iron 16, TIBC 121, %sat 13      [05-17-24 @ 06:00]  Ferritin 720      [05-17-24 @ 06:00]  PTH -- (Ca --)      [05-26-24 @ 01:20]   122  TSH 2.60      [05-17-24 @ 06:00]  Lipid: chol 86, , HDL 27, LDL --      [05-17-24 @ 06:00]    HBsAb <3.0      [05-01-24 @ 14:42]  HBcAb Nonreact      [05-01-24 @ 14:42]

## 2024-05-27 NOTE — PROGRESS NOTE ADULT - SUBJECTIVE AND OBJECTIVE BOX
Neurology Progress Note    S: Patient seen and examined.     Medications: MEDICATIONS  (STANDING):  albuterol/ipratropium for Nebulization 3 milliLiter(s) Nebulizer every 12 hours  atorvastatin 20 milliGRAM(s) Oral at bedtime  carvedilol 12.5 milliGRAM(s) Oral every 12 hours  chlorhexidine 0.12% Liquid 15 milliLiter(s) Oral Mucosa every 12 hours  chlorhexidine 2% Cloths 1 Application(s) Topical <User Schedule>  dextrose 10% Bolus 125 milliLiter(s) IV Bolus once  dextrose 5%. 1000 milliLiter(s) (50 mL/Hr) IV Continuous <Continuous>  dextrose 5%. 1000 milliLiter(s) (100 mL/Hr) IV Continuous <Continuous>  dextrose 50% Injectable 12.5 Gram(s) IV Push once  dextrose 50% Injectable 25 Gram(s) IV Push once  epoetin reilly (EPOGEN) Injectable 77864 Unit(s) IV Push <User Schedule>  ferrous    sulfate Liquid 300 milliGRAM(s) Enteral Tube daily  glucagon  Injectable 1 milliGRAM(s) IntraMuscular once  hydrALAZINE 100 milliGRAM(s) Oral three times a day  insulin lispro (ADMELOG) corrective regimen sliding scale   SubCutaneous every 6 hours  insulin NPH human recombinant 2 Unit(s) SubCutaneous every 6 hours  pantoprazole  Injectable 40 milliGRAM(s) IV Push every 12 hours  piperacillin/tazobactam IVPB.. 3.375 Gram(s) IV Intermittent every 12 hours  sodium bicarbonate 650 milliGRAM(s) Oral every 8 hours  sodium chloride 3%  Inhalation 4 milliLiter(s) Inhalation every 12 hours    MEDICATIONS  (PRN):  acetaminophen   Oral Liquid .. 650 milliGRAM(s) Oral every 6 hours PRN Temp greater or equal to 38C (100.4F), Moderate Pain (4 - 6)  dextrose Oral Gel 15 Gram(s) Oral once PRN Blood Glucose LESS THAN 70 milliGRAM(s)/deciliter  melatonin 3 milliGRAM(s) Oral at bedtime PRN Insomnia  sodium chloride 0.9% lock flush 10 milliLiter(s) IV Push every 1 hour PRN Pre/post blood products, medications, blood draw, and to maintain line patency       Vitals:  Vital Signs Last 24 Hrs  T(C): 37.4 (27 May 2024 16:00), Max: 37.4 (27 May 2024 16:00)  T(F): 99.4 (27 May 2024 16:00), Max: 99.4 (27 May 2024 16:00)  HR: 74 (27 May 2024 19:31) (56 - 79)  BP: 169/71 (27 May 2024 16:00) (124/42 - 208/55)  BP(mean): --  RR: 22 (27 May 2024 16:00) (12 - 22)  SpO2: 100% (27 May 2024 19:31) (100% - 100%)    Parameters below as of 27 May 2024 19:31  Patient On (Oxygen Delivery Method): ventilator            General Exam:   General Appearance: + trach  Head: Normocephalic, atraumatic and no dysmorphic features  Ear, Nose, and Throat: Moist mucous membranes  CVS: S1S2+  Resp: mechanical  Abd: soft NTND  Extremities: No edema, no cyanosis  Skin: No bruises, no rashes     Neurological Exam:  Mental Status: Opens to voice, tracks, follows simple commands. Mouths words  Cranial Nerves: pupils 1-2mm, R facial palsy with smile  Motor: normal tone, RUE and RLE drift.  Sensation:  intact      I personally reviewed the below data/images/labs:    LABS:                          7.8    7.04  )-----------( 375      ( 27 May 2024 19:22 )             23.9     05-27    134<L>  |  93<L>  |  43<H>  ----------------------------<  135<H>  4.1   |  23  |  5.73<H>    Ca    8.2<L>      27 May 2024 05:45  Phos  4.7     05-27  Mg     2.10     05-27    TPro  5.6<L>  /  Alb  2.4<L>  /  TBili  0.2  /  DBili  x   /  AST  21  /  ALT  16  /  AlkPhos  104  05-26    LIVER FUNCTIONS - ( 26 May 2024 01:20 )  Alb: 2.4 g/dL / Pro: 5.6 g/dL / ALK PHOS: 104 U/L / ALT: 16 U/L / AST: 21 U/L / GGT: x           PT/INR - ( 26 May 2024 01:20 )   PT: 15.5 sec;   INR: 1.40 ratio         PTT - ( 26 May 2024 15:33 )  PTT:77.0 sec  Urinalysis Basic - ( 27 May 2024 05:45 )    Color: x / Appearance: x / SG: x / pH: x  Gluc: 135 mg/dL / Ketone: x  / Bili: x / Urobili: x   Blood: x / Protein: x / Nitrite: x   Leuk Esterase: x / RBC: x / WBC x   Sq Epi: x / Non Sq Epi: x / Bacteria: x                CT Head No Cont:  (01 May 2024 18:11)  < from: CT Head No Cont (05.01.24 @ 18:11) >    ACC: [id number] EXAM:  CT BRAIN   ORDERED BY: SHELBY MOREL     PROCEDURE DATE:  05/01/2024          INTERPRETATION:  CT OF THE HEAD WITHOUT CONTRAST    CLINICAL INDICATION: Lethargy.    TECHNIQUE: Volumetric CT acquisition was performed through the brain and   reviewed using brain and bone window technique.      COMPARISON: CT head from 1/17/2024    FINDINGS:    The ventricular and sulcal size and configuration is age appropriate.     There is no acute loss of gray-white differentiation. Stable bilateral   inferior frontal encephalomalacia and gliosis likely related to remote   trauma.    There is no evidence of mass effect, midline shift, acute intracranial   hemorrhage, or extra-axial collections.     The calvarium is intact. The paranasalsinuses are clear.The mastoid air   cells are predominantly clear. The orbits are unremarkable.      IMPRESSION:  No acute intracranial hemorrhage or acute territorial infarction. Chronic   findings as above.    --- End of Report ---          GILDARDO KHAN; Resident Radiologist  This document has been electronically signed.  LADARIUS DENNY MD; Attending Radiologist  This document has been electronically signed. May  1 2024  7:54PM    < end of copied text >      EEG Classification / Summary:  Abnormal EEG in the awake, drowsy states.   -Generalized sharp waves with triphasic morphology, initially appearing as frequent GPDs at 1-1.5 Hz, decreasing in prevalence later in recording.  -Variable moderate to mild diffuse slowing.  -No electrographic seizures are captured.     Clinical Impression:  -Generalized sharp waves with triphasic morphology can be seen in toxic-metabolic encephalopathies and indicate some risk of generalized seizures, especially when at higher frequencies than in this study. These decrease in prevalence over the course of recording.  -Variable moderate to mild diffuse cerebral dysfunction is nonspecific in etiology.   -No seizures x2 days of recording.    m< from: MR Head w/wo IV Cont (05.06.24 @ 18:10) >    ACC: [id number] EXAM:  MR BRAIN WAW IC   ORDERED BY: DOLORES QUICK     PROCEDURE DATE:  05/06/2024          INTERPRETATION:  CLINICAL INDICATION: Altered mental status.    TECHNIQUE: Multi-planar, multi-sequence magnetic resonance imaging of the   brain was performed with and without intravenous contrast.    CONTRAST: Post-contrast MR images were obtained following infusion of 5   mL of Gadavist    COMPARISON: No prior studies are available for comparison    FINDINGS:    VENTRICLES AND SULCI:  Normal.  INTRA-AXIAL: Punctate foci of restricted diffusion in the left talya and   left cerebellum with associated T2 prolongation consistent with acute   infarcts. No acute intracranial hemorrhage. Encephalomalacia/gliosis   noted in the inferior frontal lobes. No abnormal enhancement. There are   patchy and confluent foci of hyperintense T2 signal within the   subcortical and periventricular white matter which are nonspecific but   likely related to chronic microvascular ischemic disease.  EXTRA-AXIAL:  No mass or collection.  VISUALIZED SINUSES: Mild polypoid mucosal thickening. Fluid noted in the   nasopharynx.  VISUALIZED MASTOIDS:  Clear.  CALVARIUM:  Normal.  CAROTID FLOW VOIDS: Normal.  MISCELLANEOUS:  None.    IMPRESSION: PUNCTATE FOCI OF RESTRICTED DIFFUSION IN THE LEFT TALYA AND   LEFT CEREBELLUM WITH ASSOCIATED T2 PROLONGATION CONSISTENT WITH ACUTE   INFARCTS. NO ACUTE INTRACRANIAL HEMORRHAGE. NO AREA OF ABNORMAL   ENHANCEMENT.    < end of copied text >    m< from: MR Angio Head No Cont (05.09.24 @ 15:58) >    ACC: [id number] EXAM:  MR ANGIO NECK WAW IC   ORDERED BY: OMAR NESBITT     ACC: [id number] EXAM:  MR ANGIO BRAIN   ORDERED BY: OMAR NESBITT     PROCEDURE DATE:  05/09/2024          INTERPRETATION:  .    CLINICAL INFORMATION: Stroke workup. Talya/cerebellar stroke.    TECHNIQUE: MRA images through the neck and [place] were obtained   using a combination of 2-D and 3-D time-of-flight acquisition. Post   contrast MR angiography of the neck was also performed. The data was then   reformatted intoa volumetric data set and reviewed as rotational MIP   images. 5 cc's of IV Gadavist was administered without immediate   complication. 2.5 cc's was discarded.    COMPARISON: Prior head CT exam dated 5/1/2024.    FINDINGS:    MRA Neck: There is a standard anatomic configuration to the aortic arch.    The origins of the great vessels appear unremarkable. The bilateral   common carotid arteries and carotid bifurcations appear unremarkable.    The bilateral cervical internal carotid arteries are within normal limits.    The origins of the bilateral vertebral arteries are normal. The bilateral   cervical vertebral arteries are normal in course and caliber.    MRA [place]: The bilateral intracranial internal carotid,   anterior, and middle cerebral arteries appear unremarkable.    The anterior and bilateral posterior communicating arteries are notable.    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 Otherwise, the   bilateral intradural vertebral arteries, vertebrobasilar junction,   basilar artery, and basilar tip appear unremarkable as well as the   bilateral posterior cerebral arteries.    IMPRESSION: No large vessel occlusion or stenosis.    < end of copied text >

## 2024-05-27 NOTE — CHART NOTE - NSCHARTNOTEFT_GEN_A_CORE
Patient appeared rather withdrawn over the past 2-3 days upon my assessment. Additionally has been intermittently refusing care/meds per nursing. S/w patient this morning. He preferred to respond via writing. Discussed observation that patient has been appearing rather withdrawn and down which patient agreed with. Offered  services to assess options for mood optimization. Patient agreed to  consult.     Asked patient whether he had any thoughts of self harm. Initially the patient paused then nodded his head 'yes'. Upon repeat questioning, he paused the nodded his head 'no.'    Will initiate CO for now as a precaution and have  consult tomorrow.

## 2024-05-27 NOTE — PROGRESS NOTE ADULT - ASSESSMENT
I have difficulty breathing, chills since 6 a.m. 73-year-old male past medical history hypertension, ESRD on dialysis Monday Wednesday Friday, diabetes insulin-dependent presents with hiccups patient endorses persistent hiccups for the last 2 days. Nephrology consulted for HD needs.    A/P  ESRD:  Center: [facility]  Nephrologist: Dr. Hardwick  Access: R AVF  MWF schedule  Vascular for fistulogram   s/p femoral shiley on 5/1, now removed  s/p placement of IJ shiley 5/3  Stopped CRRT   Restarted IHD  s/p HD 5/7 UF 1778; treatment terminated early due to hypotension   S/P MRA head/neck w/ gadolinium --> Received HD on 5/9 and 5/10.  5/10 Will need to dialyze 3 days consecutively--> plan for HD on 5/11 5/11 shiley removed due to bacteremia; did not receive HD.  Line holiday  5/12 - BUN worsened; K up trending.    5/13 Shiley placed.  5/17: s/p PEG placement.   5/21 IR consulted for shiley exchange; IR recommended to keep current shiley in place, with pending fistulogram   5/25 - Pt still did not go for fistulogram d/t scheduling issues.  Please exchange shiley that was placed on 5/13 or switch to PC. D/W team.  Continue HD MWF   HD today before shiley removal  Consent obtained and placed in ED chart  Renal diet  Monitor BMP  consider [facility] for rehab where his outpatient Nephro-Dr. Hardwick can follow him    Hyperkalemia/Hypokalemia  Improved w/ HD.  C/W HD schedule as above.  Lokelma 10gm PRN for K >5.3 on non-HD days  PhosNaK given 5/21 and again 5/23.  K stable.  Low K diet.  Monitor closely     HTN:  BP fluctuating. BP acceptable at this time  On carvedilol 12.5mg BID, hydralazine 100mg TID.  Currently off nifedipine --> consider restarting nifedipine @ 30mg if BP remains suboptimal.  UF w/ HD as BP permits.  Monitor BP.    Anemia:  Hgb low.  + iron deficiency.  Tsat 13% - on ferrous sulfate 300mg qd via PEG.  Will hold venofer for now in view of up trending leukocytosis.  SILVA w/ HD.  Tranfuse for Hgb <7.  Monitor Hb.    CKD-MBD  Check PTH  PO4 increasing again  Sevelamer 1600mg TID d/c'ed 5/21.  S/p PhosNaK x2 doses 5/23.  PO4 WNL.  Monitor Ca, PO4 daily    Hypocalcemia:  In setting of CKD vs. hypoalbuminemia.  Optimize albumin.  Corrected Ca WNL.  Replete as needed.  Monitor Ca.

## 2024-05-28 ENCOUNTER — RESULT REVIEW (OUTPATIENT)
Age: 74
End: 2024-05-28

## 2024-05-28 LAB
ANION GAP SERPL CALC-SCNC: 16 MMOL/L — HIGH (ref 7–14)
BASOPHILS # BLD AUTO: 0.05 K/UL — SIGNIFICANT CHANGE UP (ref 0–0.2)
BASOPHILS NFR BLD AUTO: 0.7 % — SIGNIFICANT CHANGE UP (ref 0–2)
BUN SERPL-MCNC: 22 MG/DL — SIGNIFICANT CHANGE UP (ref 7–23)
CALCIUM SERPL-MCNC: 8.1 MG/DL — LOW (ref 8.4–10.5)
CHLORIDE SERPL-SCNC: 96 MMOL/L — LOW (ref 98–107)
CO2 SERPL-SCNC: 25 MMOL/L — SIGNIFICANT CHANGE UP (ref 22–31)
CREAT SERPL-MCNC: 3.96 MG/DL — HIGH (ref 0.5–1.3)
EGFR: 15 ML/MIN/1.73M2 — LOW
EOSINOPHIL # BLD AUTO: 0.49 K/UL — SIGNIFICANT CHANGE UP (ref 0–0.5)
EOSINOPHIL NFR BLD AUTO: 6.4 % — HIGH (ref 0–6)
GLUCOSE BLDC GLUCOMTR-MCNC: 121 MG/DL — HIGH (ref 70–99)
GLUCOSE BLDC GLUCOMTR-MCNC: 123 MG/DL — HIGH (ref 70–99)
GLUCOSE BLDC GLUCOMTR-MCNC: 138 MG/DL — HIGH (ref 70–99)
GLUCOSE BLDC GLUCOMTR-MCNC: 151 MG/DL — HIGH (ref 70–99)
GLUCOSE BLDC GLUCOMTR-MCNC: 168 MG/DL — HIGH (ref 70–99)
GLUCOSE SERPL-MCNC: 113 MG/DL — HIGH (ref 70–99)
HCT VFR BLD CALC: 25 % — LOW (ref 39–50)
HCT VFR BLD CALC: 26 % — LOW (ref 39–50)
HGB BLD-MCNC: 8.2 G/DL — LOW (ref 13–17)
HGB BLD-MCNC: 8.6 G/DL — LOW (ref 13–17)
IANC: 5.84 K/UL — SIGNIFICANT CHANGE UP (ref 1.8–7.4)
IMM GRANULOCYTES NFR BLD AUTO: 0.4 % — SIGNIFICANT CHANGE UP (ref 0–0.9)
LYMPHOCYTES # BLD AUTO: 0.68 K/UL — LOW (ref 1–3.3)
LYMPHOCYTES # BLD AUTO: 8.8 % — LOW (ref 13–44)
MAGNESIUM SERPL-MCNC: 2.1 MG/DL — SIGNIFICANT CHANGE UP (ref 1.6–2.6)
MCHC RBC-ENTMCNC: 22.2 PG — LOW (ref 27–34)
MCHC RBC-ENTMCNC: 22.4 PG — LOW (ref 27–34)
MCHC RBC-ENTMCNC: 32.8 GM/DL — SIGNIFICANT CHANGE UP (ref 32–36)
MCHC RBC-ENTMCNC: 33.1 GM/DL — SIGNIFICANT CHANGE UP (ref 32–36)
MCV RBC AUTO: 67.7 FL — LOW (ref 80–100)
MCV RBC AUTO: 67.8 FL — LOW (ref 80–100)
MONOCYTES # BLD AUTO: 0.6 K/UL — SIGNIFICANT CHANGE UP (ref 0–0.9)
MONOCYTES NFR BLD AUTO: 7.8 % — SIGNIFICANT CHANGE UP (ref 2–14)
NEUTROPHILS # BLD AUTO: 5.84 K/UL — SIGNIFICANT CHANGE UP (ref 1.8–7.4)
NEUTROPHILS NFR BLD AUTO: 75.9 % — SIGNIFICANT CHANGE UP (ref 43–77)
NRBC # BLD: 0 /100 WBCS — SIGNIFICANT CHANGE UP (ref 0–0)
NRBC # BLD: 0 /100 WBCS — SIGNIFICANT CHANGE UP (ref 0–0)
NRBC # FLD: 0 K/UL — SIGNIFICANT CHANGE UP (ref 0–0)
NRBC # FLD: 0 K/UL — SIGNIFICANT CHANGE UP (ref 0–0)
PHOSPHATE SERPL-MCNC: 3.5 MG/DL — SIGNIFICANT CHANGE UP (ref 2.5–4.5)
PLATELET # BLD AUTO: 357 K/UL — SIGNIFICANT CHANGE UP (ref 150–400)
PLATELET # BLD AUTO: 389 K/UL — SIGNIFICANT CHANGE UP (ref 150–400)
POTASSIUM SERPL-MCNC: 3.2 MMOL/L — LOW (ref 3.5–5.3)
POTASSIUM SERPL-SCNC: 3.2 MMOL/L — LOW (ref 3.5–5.3)
RBC # BLD: 3.69 M/UL — LOW (ref 4.2–5.8)
RBC # BLD: 3.84 M/UL — LOW (ref 4.2–5.8)
RBC # FLD: 22.5 % — HIGH (ref 10.3–14.5)
RBC # FLD: 22.8 % — HIGH (ref 10.3–14.5)
SODIUM SERPL-SCNC: 137 MMOL/L — SIGNIFICANT CHANGE UP (ref 135–145)
WBC # BLD: 5.92 K/UL — SIGNIFICANT CHANGE UP (ref 3.8–10.5)
WBC # BLD: 7.69 K/UL — SIGNIFICANT CHANGE UP (ref 3.8–10.5)
WBC # FLD AUTO: 5.92 K/UL — SIGNIFICANT CHANGE UP (ref 3.8–10.5)
WBC # FLD AUTO: 7.69 K/UL — SIGNIFICANT CHANGE UP (ref 3.8–10.5)

## 2024-05-28 PROCEDURE — 93931 UPPER EXTREMITY STUDY: CPT | Mod: 26,RT

## 2024-05-28 PROCEDURE — 99233 SBSQ HOSP IP/OBS HIGH 50: CPT

## 2024-05-28 PROCEDURE — 93971 EXTREMITY STUDY: CPT | Mod: 26

## 2024-05-28 RX ORDER — AMLODIPINE BESYLATE 2.5 MG/1
5 TABLET ORAL DAILY
Refills: 0 | Status: DISCONTINUED | OUTPATIENT
Start: 2024-05-28 | End: 2024-05-31

## 2024-05-28 RX ORDER — HUMAN INSULIN 100 [IU]/ML
4 INJECTION, SUSPENSION SUBCUTANEOUS EVERY 6 HOURS
Refills: 0 | Status: DISCONTINUED | OUTPATIENT
Start: 2024-05-28 | End: 2024-06-14

## 2024-05-28 RX ADMIN — SODIUM CHLORIDE 4 MILLILITER(S): 9 INJECTION INTRAMUSCULAR; INTRAVENOUS; SUBCUTANEOUS at 19:25

## 2024-05-28 RX ADMIN — ATORVASTATIN CALCIUM 20 MILLIGRAM(S): 80 TABLET, FILM COATED ORAL at 21:18

## 2024-05-28 RX ADMIN — Medication 100 MILLIGRAM(S): at 20:07

## 2024-05-28 RX ADMIN — HUMAN INSULIN 2 UNIT(S): 100 INJECTION, SUSPENSION SUBCUTANEOUS at 12:29

## 2024-05-28 RX ADMIN — Medication 2: at 17:45

## 2024-05-28 RX ADMIN — AMLODIPINE BESYLATE 5 MILLIGRAM(S): 2.5 TABLET ORAL at 12:37

## 2024-05-28 RX ADMIN — PIPERACILLIN AND TAZOBACTAM 25 GRAM(S): 4; .5 INJECTION, POWDER, LYOPHILIZED, FOR SOLUTION INTRAVENOUS at 05:16

## 2024-05-28 RX ADMIN — PANTOPRAZOLE SODIUM 40 MILLIGRAM(S): 20 TABLET, DELAYED RELEASE ORAL at 05:15

## 2024-05-28 RX ADMIN — CARVEDILOL PHOSPHATE 12.5 MILLIGRAM(S): 80 CAPSULE, EXTENDED RELEASE ORAL at 20:08

## 2024-05-28 RX ADMIN — Medication 100 MILLIGRAM(S): at 12:25

## 2024-05-28 RX ADMIN — PANTOPRAZOLE SODIUM 40 MILLIGRAM(S): 20 TABLET, DELAYED RELEASE ORAL at 17:47

## 2024-05-28 RX ADMIN — CHLORHEXIDINE GLUCONATE 1 APPLICATION(S): 213 SOLUTION TOPICAL at 05:14

## 2024-05-28 RX ADMIN — Medication 650 MILLIGRAM(S): at 05:16

## 2024-05-28 RX ADMIN — Medication 650 MILLIGRAM(S): at 22:10

## 2024-05-28 RX ADMIN — Medication 3 MILLILITER(S): at 19:22

## 2024-05-28 RX ADMIN — Medication 300 MILLIGRAM(S): at 12:25

## 2024-05-28 RX ADMIN — CARVEDILOL PHOSPHATE 12.5 MILLIGRAM(S): 80 CAPSULE, EXTENDED RELEASE ORAL at 08:22

## 2024-05-28 RX ADMIN — Medication 100 MILLIGRAM(S): at 03:44

## 2024-05-28 RX ADMIN — CHLORHEXIDINE GLUCONATE 15 MILLILITER(S): 213 SOLUTION TOPICAL at 17:47

## 2024-05-28 RX ADMIN — HUMAN INSULIN 4 UNIT(S): 100 INJECTION, SUSPENSION SUBCUTANEOUS at 23:52

## 2024-05-28 RX ADMIN — CHLORHEXIDINE GLUCONATE 15 MILLILITER(S): 213 SOLUTION TOPICAL at 05:15

## 2024-05-28 RX ADMIN — Medication 3 MILLILITER(S): at 08:51

## 2024-05-28 RX ADMIN — SODIUM CHLORIDE 4 MILLILITER(S): 9 INJECTION INTRAMUSCULAR; INTRAVENOUS; SUBCUTANEOUS at 08:51

## 2024-05-28 RX ADMIN — HUMAN INSULIN 2 UNIT(S): 100 INJECTION, SUSPENSION SUBCUTANEOUS at 05:34

## 2024-05-28 RX ADMIN — Medication 650 MILLIGRAM(S): at 14:13

## 2024-05-28 RX ADMIN — Medication 650 MILLIGRAM(S): at 21:18

## 2024-05-28 RX ADMIN — Medication 650 MILLIGRAM(S): at 21:17

## 2024-05-28 RX ADMIN — Medication 2: at 23:51

## 2024-05-28 RX ADMIN — HUMAN INSULIN 2 UNIT(S): 100 INJECTION, SUSPENSION SUBCUTANEOUS at 17:46

## 2024-05-28 NOTE — PROGRESS NOTE ADULT - SUBJECTIVE AND OBJECTIVE BOX
Vascular Surgery Progress Note     Subjective:  Patient seen and examined on AM rounds. Awake and alert, remains on vent via trach. Afebrile, last fever 5/25.      Vital Signs:  Vital Signs Last 24 Hrs  T(C): 36.7 (28 May 2024 04:00), Max: 37.4 (27 May 2024 16:00)  T(F): 98.1 (28 May 2024 04:00), Max: 99.4 (27 May 2024 16:00)  HR: 60 (28 May 2024 07:52) (58 - 79)  BP: 167/67 (28 May 2024 04:00) (134/72 - 208/55)  BP(mean): --  RR: 12 (28 May 2024 04:00) (12 - 22)  SpO2: 100% (28 May 2024 07:52) (100% - 100%)    Parameters below as of 28 May 2024 04:00  Patient On (Oxygen Delivery Method): ventilator    O2 Concentration (%): 30    CAPILLARY BLOOD GLUCOSE      POCT Blood Glucose.: 123 mg/dL (28 May 2024 05:23)  POCT Blood Glucose.: 120 mg/dL (27 May 2024 23:24)  POCT Blood Glucose.: 132 mg/dL (27 May 2024 17:19)  POCT Blood Glucose.: 101 mg/dL (27 May 2024 11:13)      I&O's Detail    27 May 2024 07:01  -  28 May 2024 07:00  --------------------------------------------------------  IN:    Nepro: 120 mL    Other (mL): 400 mL  Total IN: 520 mL    OUT:    Other (mL): 2400 mL  Total OUT: 2400 mL    Total NET: -1880 mL            Physical Exam:  General: NAD, resting comfortably in bed  Respiratory: Nonlabored respirations, s/p trach on vent  Cardio: regular rate  Vascular: R AVF w/ palpable thrill and pulse      Labs:    05-28    137  |  96<L>  |  22  ----------------------------<  113<H>  3.2<L>   |  25  |  3.96<H>    Ca    8.1<L>      28 May 2024 05:25  Phos  3.5     05-28  Mg     2.10     05-28                              8.2    5.92  )-----------( 389      ( 28 May 2024 05:25 )             25.0     PTT - ( 26 May 2024 15:33 )  PTT:77.0 sec

## 2024-05-28 NOTE — PROGRESS NOTE ADULT - NS ATTEND AMEND GEN_ALL_CORE FT
73M PMH ESRD on HD, HTN, and DM2 presented to [facility] on 4/29/24 with chest pressure and hiccups, admitted 2/2 concern for demand ischemia with hospital course c/b baclofen toxicity and acute ischemic CVA left talya/cerebellum c/b acute hypoxemic and hypercapnic respiratory failure s/p ETT intubation/MV with failure to wean from ventilator s/p tracheostomy. Hospital course further complicated by aspiration and B cereus bacteremia and RLE DVT. Now transferred to RCU 5/16 for further management.     #Neuro: improved acute encephalopathy due to baclofen toxicity. D/c 1:1MRI head 5/6 with left talya and left cerebellum ischemic infarcts with no vessel occlusion/stenosis on MRA. Will need to restart aspirin if H/H remains stable and resolved melena. Continue statin. F/up neuro  #CV: HD stable, continue carvedilol and hydralazine for HTN. Start amlodipine 5 mg qd  #Pulm: continue lung protective ventilation, now doing well on pressure support 5/5 30%. Trach collar trial as tolerates. Airway clearance therapy with albuterol-ipratropium nebs + 3%NaCl nebs  #GI: monitor melena, now improved. If H/H remains stable, will restart heparin gtt and aspirin 81. PPI BID. Continue PEG feeds  #Renal: ESRD, f/up vascular surgery regarding fistulogram. F/up IR regarding permacath placement. S/p HD 5/27 followed by line removal for line holiday  #ID: completed course of Zosyn today for aspiration pneumonia and B. cereus bacteremia. F/up ID. Monitor for recurrence of fevers  #Heme: RLE DVT, check duplex. Restart heparin gtt if H/H remains stable and melena improves  #Dispo: full code, discussed with patient and wife at bedside

## 2024-05-28 NOTE — PROGRESS NOTE ADULT - SUBJECTIVE AND OBJECTIVE BOX
[facility] [place] [facility]   MD ELIANE BHAGAT MD MARIA SANTIAGO, NP    TEL:  OFFICE: [phone]  From 5pm-7am Answering Service [phone]    -- RENAL FOLLOW UP NOTE ---Date of Service 05-28-24 @ 16:23    Patient is a 73y old  Male who presents with a chief complaint of Unstable angina, abn EKG       Patient seen and examined at bedside. Pt is trach to vent, in no apparent distress.    VITALS:  T(F): 98.2 (05-28-24 @ 12:25), Max: 98.2 (05-28-24 @ 08:21)  HR: 85 (05-28-24 @ 15:31)  BP: 167/67 (05-28-24 @ 12:25)  RR: 17 (05-28-24 @ 12:25)  SpO2: 98% (05-28-24 @ 15:31)  Wt(kg): --    05-27 @ 07:01  -  05-28 @ 07:00  --------------------------------------------------------  IN: 520 mL / OUT: 2400 mL / NET: -1880 mL          PHYSICAL EXAM:  General: NAD  Neck: No JVD  Respiratory: CTAB, no wheezes, rales or rhonchi; trach to vent  Cardiovascular: S1, S2, RRR  Gastrointestinal: BS+, soft, NT/ND  Extremities: No peripheral edema    Hospital Medications:   MEDICATIONS  (STANDING):  albuterol/ipratropium for Nebulization 3 milliLiter(s) Nebulizer every 12 hours  amLODIPine   Tablet 5 milliGRAM(s) Oral daily  atorvastatin 20 milliGRAM(s) Oral at bedtime  carvedilol 12.5 milliGRAM(s) Oral every 12 hours  chlorhexidine 0.12% Liquid 15 milliLiter(s) Oral Mucosa every 12 hours  chlorhexidine 2% Cloths 1 Application(s) Topical <User Schedule>  dextrose 10% Bolus 125 milliLiter(s) IV Bolus once  dextrose 5%. 1000 milliLiter(s) (50 mL/Hr) IV Continuous <Continuous>  dextrose 5%. 1000 milliLiter(s) (100 mL/Hr) IV Continuous <Continuous>  dextrose 50% Injectable 12.5 Gram(s) IV Push once  dextrose 50% Injectable 25 Gram(s) IV Push once  epoetin reilly (EPOGEN) Injectable 03589 Unit(s) IV Push <User Schedule>  ferrous    sulfate Liquid 300 milliGRAM(s) Enteral Tube daily  glucagon  Injectable 1 milliGRAM(s) IntraMuscular once  hydrALAZINE 100 milliGRAM(s) Oral three times a day  insulin lispro (ADMELOG) corrective regimen sliding scale   SubCutaneous every 6 hours  insulin NPH human recombinant 2 Unit(s) SubCutaneous every 6 hours  pantoprazole  Injectable 40 milliGRAM(s) IV Push every 12 hours  sodium bicarbonate 650 milliGRAM(s) Oral every 8 hours  sodium chloride 3%  Inhalation 4 milliLiter(s) Inhalation every 12 hours      LABS:  05-28    137  |  96<L>  |  22  ----------------------------<  113<H>  3.2<L>   |  25  |  3.96<H>    Ca    8.1<L>      28 May 2024 05:25  Phos  3.5     05-28  Mg     2.10     05-28      Creatinine Trend: 3.96 <--, 5.73 <--, 4.48 <--, 3.16 <--, 4.64 <--, 3.29 <--, 4.22 <--    Phosphorus: 3.5 mg/dL (05-28 @ 05:25)                              8.2    5.92  )-----------( 389      ( 28 May 2024 05:25 )             25.0     Urine Studies:  Urinalysis - [05-28-24 @ 05:25]      Color  / Appearance  / SG  / pH       Gluc 113 / Ketone   / Bili  / Urobili        Blood  / Protein  / Leuk Est  / Nitrite       RBC  / WBC  / Hyaline  / Gran  / Sq Epi  / Non Sq Epi  / Bacteria       Iron 16, TIBC 121, %sat 13      [05-17-24 @ 06:00]  Ferritin 720      [05-17-24 @ 06:00]  PTH -- (Ca --)      [05-26-24 @ 01:20]   122  TSH 2.60      [05-17-24 @ 06:00]  Lipid: chol 86, , HDL 27, LDL --      [05-17-24 @ 06:00]    HBsAb <3.0      [05-01-24 @ 14:42]  HBcAb Nonreact      [05-01-24 @ 14:42]      RADIOLOGY & ADDITIONAL STUDIES:

## 2024-05-28 NOTE — PROGRESS NOTE ADULT - SUBJECTIVE AND OBJECTIVE BOX
Patient is a 73y old  Male who presents with a chief complaint of Unstable angina, abn EKG (28 May 2024 16:22)      SUBJECTIVE / OVERNIGHT EVENTS: fistulogram pending    MEDICATIONS  (STANDING):  albuterol/ipratropium for Nebulization 3 milliLiter(s) Nebulizer every 12 hours  amLODIPine   Tablet 5 milliGRAM(s) Oral daily  atorvastatin 20 milliGRAM(s) Oral at bedtime  carvedilol 12.5 milliGRAM(s) Oral every 12 hours  chlorhexidine 0.12% Liquid 15 milliLiter(s) Oral Mucosa every 12 hours  chlorhexidine 2% Cloths 1 Application(s) Topical <User Schedule>  dextrose 10% Bolus 125 milliLiter(s) IV Bolus once  dextrose 5%. 1000 milliLiter(s) (50 mL/Hr) IV Continuous <Continuous>  dextrose 5%. 1000 milliLiter(s) (100 mL/Hr) IV Continuous <Continuous>  dextrose 50% Injectable 12.5 Gram(s) IV Push once  dextrose 50% Injectable 25 Gram(s) IV Push once  epoetin reilly (EPOGEN) Injectable 59172 Unit(s) IV Push <User Schedule>  ferrous    sulfate Liquid 300 milliGRAM(s) Enteral Tube daily  glucagon  Injectable 1 milliGRAM(s) IntraMuscular once  hydrALAZINE 100 milliGRAM(s) Oral three times a day  insulin lispro (ADMELOG) corrective regimen sliding scale   SubCutaneous every 6 hours  insulin NPH human recombinant 2 Unit(s) SubCutaneous every 6 hours  pantoprazole  Injectable 40 milliGRAM(s) IV Push every 12 hours  sodium bicarbonate 650 milliGRAM(s) Oral every 8 hours  sodium chloride 3%  Inhalation 4 milliLiter(s) Inhalation every 12 hours    MEDICATIONS  (PRN):  acetaminophen   Oral Liquid .. 650 milliGRAM(s) Oral every 6 hours PRN Temp greater or equal to 38C (100.4F), Moderate Pain (4 - 6)  dextrose Oral Gel 15 Gram(s) Oral once PRN Blood Glucose LESS THAN 70 milliGRAM(s)/deciliter  melatonin 3 milliGRAM(s) Oral at bedtime PRN Insomnia  sodium chloride 0.9% lock flush 10 milliLiter(s) IV Push every 1 hour PRN Pre/post blood products, medications, blood draw, and to maintain line patency      Vital Signs Last 24 Hrs  T(F): 98.2 (05-28-24 @ 12:25), Max: 98.2 (05-28-24 @ 08:21)  HR: 85 (05-28-24 @ 15:31) (60 - 85)  BP: 167/67 (05-28-24 @ 12:25) (134/72 - 180/68)  RR: 17 (05-28-24 @ 12:25) (12 - 17)  SpO2: 98% (05-28-24 @ 15:31) (98% - 100%)  Telemetry:   CAPILLARY BLOOD GLUCOSE      POCT Blood Glucose.: 138 mg/dL (28 May 2024 12:26)  POCT Blood Glucose.: 121 mg/dL (28 May 2024 11:04)  POCT Blood Glucose.: 123 mg/dL (28 May 2024 05:23)  POCT Blood Glucose.: 120 mg/dL (27 May 2024 23:24)  POCT Blood Glucose.: 132 mg/dL (27 May 2024 17:19)    I&O's Summary    27 May 2024 07:01  -  28 May 2024 07:00  --------------------------------------------------------  IN: 520 mL / OUT: 2400 mL / NET: -1880 mL        PHYSICAL EXAM:  GENERAL: NAD, well-developed  HEAD:  Atraumatic, Normocephalic  EYES: EOMI, PERRLA, conjunctiva and sclera clear  NECK: Supple, No JVD  CHEST/LUNG: Clear to auscultation bilaterally; No wheeze  HEART: Regular rate and rhythm; No murmurs, rubs, or gallops  ABDOMEN: Soft, Nontender, Nondistended; Bowel sounds present  EXTREMITIES:  2+ Peripheral Pulses, No clubbing, cyanosis, or edema  PSYCH: AAOx3  NEUROLOGY: non-focal  SKIN: No rashes or lesions    LABS:                        8.2    5.92  )-----------( 389      ( 28 May 2024 05:25 )             25.0     05-28    137  |  96<L>  |  22  ----------------------------<  113<H>  3.2<L>   |  25  |  3.96<H>    Ca    8.1<L>      28 May 2024 05:25  Phos  3.5     05-28  Mg     2.10     05-28            Urinalysis Basic - ( 28 May 2024 05:25 )    Color: x / Appearance: x / SG: x / pH: x  Gluc: 113 mg/dL / Ketone: x  / Bili: x / Urobili: x   Blood: x / Protein: x / Nitrite: x   Leuk Esterase: x / RBC: x / WBC x   Sq Epi: x / Non Sq Epi: x / Bacteria: x        RADIOLOGY & ADDITIONAL TESTS:    Imaging Personally Reviewed:    Consultant(s) Notes Reviewed:      Care Discussed with Consultants/Other Providers:

## 2024-05-28 NOTE — PROGRESS NOTE ADULT - SUBJECTIVE AND OBJECTIVE BOX
Patient is a 73y Male     Patient is a 73y old  Male who presents with a chief complaint of Unstable angina, abn EKG (27 May 2024 22:23)      HPI:  73-year-old male past medical history hypertension, ESRD on dialysis Monday Wednesday Friday, diabetes insulin-dependent presents with hiccups patient endorses persistent hiccups for the last 2 days. found to have peaked T waves, a new finding. denies dizziness, N/V, denies CP, palps, abd pain (29 Apr 2024 21:15)      PAST MEDICAL & SURGICAL HISTORY:  Diabetes      Benign essential HTN      HLD (hyperlipidemia)      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      ESRD on hemodialysis      Arteriovenous fistula          MEDICATIONS  (STANDING):  albuterol/ipratropium for Nebulization 3 milliLiter(s) Nebulizer every 12 hours  atorvastatin 20 milliGRAM(s) Oral at bedtime  carvedilol 12.5 milliGRAM(s) Oral every 12 hours  chlorhexidine 0.12% Liquid 15 milliLiter(s) Oral Mucosa every 12 hours  chlorhexidine 2% Cloths 1 Application(s) Topical <User Schedule>  dextrose 10% Bolus 125 milliLiter(s) IV Bolus once  dextrose 5%. 1000 milliLiter(s) (100 mL/Hr) IV Continuous <Continuous>  dextrose 5%. 1000 milliLiter(s) (50 mL/Hr) IV Continuous <Continuous>  dextrose 50% Injectable 12.5 Gram(s) IV Push once  dextrose 50% Injectable 25 Gram(s) IV Push once  epoetin reilly (EPOGEN) Injectable 22223 Unit(s) IV Push <User Schedule>  ferrous    sulfate Liquid 300 milliGRAM(s) Enteral Tube daily  glucagon  Injectable 1 milliGRAM(s) IntraMuscular once  hydrALAZINE 100 milliGRAM(s) Oral three times a day  insulin lispro (ADMELOG) corrective regimen sliding scale   SubCutaneous every 6 hours  insulin NPH human recombinant 2 Unit(s) SubCutaneous every 6 hours  pantoprazole  Injectable 40 milliGRAM(s) IV Push every 12 hours  piperacillin/tazobactam IVPB.. 3.375 Gram(s) IV Intermittent every 12 hours  sodium bicarbonate 650 milliGRAM(s) Oral every 8 hours  sodium chloride 3%  Inhalation 4 milliLiter(s) Inhalation every 12 hours      Allergies    No Known Allergies    Intolerances        SOCIAL HISTORY:  Denies ETOh,Smoking,     FAMILY HISTORY:  FHx: diabetes mellitus (Father, Aunt)        REVIEW OF SYSTEMS:    CONSTITUTIONAL: No weakness, fevers or chills  EYES/ENT: No visual changes;  No vertigo or throat pain   NECK: No pain or stiffness  RESPIRATORY: No cough, wheezing, hemoptysis; No shortness of breath  CARDIOVASCULAR: No chest pain or palpitations  GASTROINTESTINAL: No abdominal or epigastric pain. No nausea, vomiting, or hematemesis; No diarrhea or constipation. No melena or hematochezia.  GENITOURINARY: No dysuria, frequency or hematuria  NEUROLOGICAL: No numbness or weakness  SKIN: No itching, burning, rashes, or lesions   All other review of systems is negative unless indicated above.    VITAL:  T(C): , Max: 37.4 (05-27-24 @ 16:00)  T(F): , Max: 99.4 (05-27-24 @ 16:00)  HR: 62 (05-28-24 @ 04:00)  BP: 167/67 (05-28-24 @ 04:00)  BP(mean): --  RR: 12 (05-28-24 @ 04:00)  SpO2: 100% (05-28-24 @ 04:00)  Wt(kg): --    I and O's:    05-27 @ 07:01  -  05-28 @ 07:00  --------------------------------------------------------  IN: 520 mL / OUT: 2400 mL / NET: -1880 mL          PHYSICAL EXAM:    Constitutional: NAD  HEENT: PERRLA,   Neck: No JVD  Respiratory: CTA B/L  Cardiovascular: S1 and S2  Gastrointestinal: BS+, soft, NT/ND  Extremities: No peripheral edema  Neurological: A/O x 3, no focal deficits  Psychiatric: Normal mood, normal affect  : No Abbasi  Skin: No rashes  Access: Not applicable  Back: No CVA tenderness    LABS:                        8.2    5.92  )-----------( 389      ( 28 May 2024 05:25 )             25.0     05-28    137  |  96<L>  |  22  ----------------------------<  113<H>  3.2<L>   |  25  |  3.96<H>    Ca    8.1<L>      28 May 2024 05:25  Phos  3.5     05-28  Mg     2.10     05-28            RADIOLOGY & ADDITIONAL STUDIES:

## 2024-05-28 NOTE — PROGRESS NOTE ADULT - SUBJECTIVE AND OBJECTIVE BOX
[facility], Division of Infectious Diseases  AUGUST Carbajal Y. Patel, S. Shah, G. Brian  [phone]  ([phone] - weekdays after 5pm and weekends)    Name: JIMMY BLAND  Age/Gender: 73y Male  MRN: [id number]    Interval History:  Notes reviewed.   No concerning overnight events.  Afebrile.   denies abd pain, SOB,  symptoms    Allergies: No Known Allergies      Objective:  Vitals:   T(F): 98.1 (05-28-24 @ 04:00), Max: 99.4 (05-27-24 @ 16:00)  HR: 65 (05-28-24 @ 11:37) (60 - 79)  BP: 167/67 (05-28-24 @ 04:00) (134/72 - 169/71)  RR: 12 (05-28-24 @ 04:00) (12 - 22)  SpO2: 100% (05-28-24 @ 11:37) (100% - 100%)  Physical Examination:  General: no acute distress  HEENT: anicteric, tracheostomy  Lungs: clear to auscultation anteriorly  Heart: S1, S2, normal rate, RIJ CVC  Abdomen: Soft. ND. NT  Extremities: No LE edema.   Skin: Warm. Dry.     Laboratory Studies:  CBC:                       8.2    5.92  )-----------( 389      ( 28 May 2024 05:25 )             25.0     WBC Trend:  5.92 05-28-24 @ 05:25  7.04 05-27-24 @ 19:22  8.62 05-27-24 @ 05:45  8.65 05-26-24 @ 21:09  9.28 05-26-24 @ 19:49  12.15 05-26-24 @ 08:38  12.60 05-26-24 @ 01:20  12.54 05-25-24 @ 18:45  11.35 05-25-24 @ 06:35  11.93 05-24-24 @ 03:30  11.06 05-23-24 @ 00:15  11.06 05-22-24 @ 07:25  9.71 05-22-24 @ 05:30    CMP: 05-28    137  |  96<L>  |  22  ----------------------------<  113<H>  3.2<L>   |  25  |  3.96<H>    Ca    8.1<L>      28 May 2024 05:25  Phos  3.5     05-28  Mg     2.10     05-28            Urinalysis Basic - ( 28 May 2024 05:25 )    Color: x / Appearance: x / SG: x / pH: x  Gluc: 113 mg/dL / Ketone: x  / Bili: x / Urobili: x   Blood: x / Protein: x / Nitrite: x   Leuk Esterase: x / RBC: x / WBC x   Sq Epi: x / Non Sq Epi: x / Bacteria: x      Microbiology: reviewed     Culture - Sputum (collected 05-25-24 @ 21:28)  Source: Trach Asp Tracheal Aspirate  Gram Stain (05-26-24 @ 13:57):    Few polymorphonuclear leukocytes per low power field    Rare Gram Negative Rods per oil power field  Final Report (05-27-24 @ 18:28):    Normal Respiratory Jocelyn present    Culture - Blood (collected 05-25-24 @ 19:00)  Source: .Blood Blood-Peripheral  Preliminary Report (05-27-24 @ 22:01):    No growth at 48 Hours    Culture - Blood (collected 05-25-24 @ 18:45)  Source: .Blood Blood-Peripheral  Preliminary Report (05-27-24 @ 22:01):    No growth at 48 Hours    Culture - Sputum (collected 05-18-24 @ 04:30)  Source: .Sputum Sputum  Gram Stain (05-18-24 @ 21:05):    Few polymorphonuclear leukocytes per low power field    No Squamous epithelial cells per low power field    No organisms seen per oil power field  Final Report (05-20-24 @ 07:02):    Normal Respiratory Jocelyn present    Culture - Blood (collected 05-18-24 @ 04:05)  Source: .Blood Blood-Venous  Final Report (05-23-24 @ 11:00):    No growth at 5 days    Culture - Blood (collected 05-18-24 @ 03:45)  Source: .Blood Blood-Venous  Final Report (05-23-24 @ 09:00):    No growth at 5 days        Radiology: reviewed     Medications:  acetaminophen   Oral Liquid .. 650 milliGRAM(s) Oral every 6 hours PRN  albuterol/ipratropium for Nebulization 3 milliLiter(s) Nebulizer every 12 hours  amLODIPine   Tablet 5 milliGRAM(s) Oral daily  atorvastatin 20 milliGRAM(s) Oral at bedtime  carvedilol 12.5 milliGRAM(s) Oral every 12 hours  chlorhexidine 0.12% Liquid 15 milliLiter(s) Oral Mucosa every 12 hours  chlorhexidine 2% Cloths 1 Application(s) Topical <User Schedule>  dextrose 10% Bolus 125 milliLiter(s) IV Bolus once  dextrose 5%. 1000 milliLiter(s) IV Continuous <Continuous>  dextrose 5%. 1000 milliLiter(s) IV Continuous <Continuous>  dextrose 50% Injectable 12.5 Gram(s) IV Push once  dextrose 50% Injectable 25 Gram(s) IV Push once  dextrose Oral Gel 15 Gram(s) Oral once PRN  epoetin reilly (EPOGEN) Injectable 78320 Unit(s) IV Push <User Schedule>  ferrous    sulfate Liquid 300 milliGRAM(s) Enteral Tube daily  glucagon  Injectable 1 milliGRAM(s) IntraMuscular once  hydrALAZINE 100 milliGRAM(s) Oral three times a day  insulin lispro (ADMELOG) corrective regimen sliding scale   SubCutaneous every 6 hours  insulin NPH human recombinant 2 Unit(s) SubCutaneous every 6 hours  melatonin 3 milliGRAM(s) Oral at bedtime PRN  pantoprazole  Injectable 40 milliGRAM(s) IV Push every 12 hours  sodium bicarbonate 650 milliGRAM(s) Oral every 8 hours  sodium chloride 0.9% lock flush 10 milliLiter(s) IV Push every 1 hour PRN  sodium chloride 3%  Inhalation 4 milliLiter(s) Inhalation every 12 hours    Antimicrobials:   [facility], Division of Infectious Diseases  AUGUST Carbajal Y. Patel, S. Shah, G. Brian  [phone]  ([phone] - weekdays after 5pm and weekends)    Name: JIMMY BLAND  Age/Gender: 73y Male  MRN: [id number]    Interval History:  Notes reviewed.   No concerning overnight events.  Afebrile.   denies abd pain, SOB,  symptoms    Allergies: No Known Allergies      Objective:  Vitals:   T(F): 98.1 (05-28-24 @ 04:00), Max: 99.4 (05-27-24 @ 16:00)  HR: 65 (05-28-24 @ 11:37) (60 - 79)  BP: 167/67 (05-28-24 @ 04:00) (134/72 - 169/71)  RR: 12 (05-28-24 @ 04:00) (12 - 22)  SpO2: 100% (05-28-24 @ 11:37) (100% - 100%)  Physical Examination:  General: no acute distress  HEENT: anicteric, tracheostomy  Lungs: clear to auscultation anteriorly  Heart: S1, S2, normal rate  Abdomen: Soft. ND. NT  Extremities: No LE edema.   Skin: Warm. Dry.     Laboratory Studies:  CBC:                       8.2    5.92  )-----------( 389      ( 28 May 2024 05:25 )             25.0     WBC Trend:  5.92 05-28-24 @ 05:25  7.04 05-27-24 @ 19:22  8.62 05-27-24 @ 05:45  8.65 05-26-24 @ 21:09  9.28 05-26-24 @ 19:49  12.15 05-26-24 @ 08:38  12.60 05-26-24 @ 01:20  12.54 05-25-24 @ 18:45  11.35 05-25-24 @ 06:35  11.93 05-24-24 @ 03:30  11.06 05-23-24 @ 00:15  11.06 05-22-24 @ 07:25  9.71 05-22-24 @ 05:30    CMP: 05-28    137  |  96<L>  |  22  ----------------------------<  113<H>  3.2<L>   |  25  |  3.96<H>    Ca    8.1<L>      28 May 2024 05:25  Phos  3.5     05-28  Mg     2.10     05-28            Urinalysis Basic - ( 28 May 2024 05:25 )    Color: x / Appearance: x / SG: x / pH: x  Gluc: 113 mg/dL / Ketone: x  / Bili: x / Urobili: x   Blood: x / Protein: x / Nitrite: x   Leuk Esterase: x / RBC: x / WBC x   Sq Epi: x / Non Sq Epi: x / Bacteria: x      Microbiology: reviewed     Culture - Sputum (collected 05-25-24 @ 21:28)  Source: Trach Asp Tracheal Aspirate  Gram Stain (05-26-24 @ 13:57):    Few polymorphonuclear leukocytes per low power field    Rare Gram Negative Rods per oil power field  Final Report (05-27-24 @ 18:28):    Normal Respiratory Jocelyn present    Culture - Blood (collected 05-25-24 @ 19:00)  Source: .Blood Blood-Peripheral  Preliminary Report (05-27-24 @ 22:01):    No growth at 48 Hours    Culture - Blood (collected 05-25-24 @ 18:45)  Source: .Blood Blood-Peripheral  Preliminary Report (05-27-24 @ 22:01):    No growth at 48 Hours    Culture - Sputum (collected 05-18-24 @ 04:30)  Source: .Sputum Sputum  Gram Stain (05-18-24 @ 21:05):    Few polymorphonuclear leukocytes per low power field    No Squamous epithelial cells per low power field    No organisms seen per oil power field  Final Report (05-20-24 @ 07:02):    Normal Respiratory Jocelyn present    Culture - Blood (collected 05-18-24 @ 04:05)  Source: .Blood Blood-Venous  Final Report (05-23-24 @ 11:00):    No growth at 5 days    Culture - Blood (collected 05-18-24 @ 03:45)  Source: .Blood Blood-Venous  Final Report (05-23-24 @ 09:00):    No growth at 5 days        Radiology: reviewed     Medications:  acetaminophen   Oral Liquid .. 650 milliGRAM(s) Oral every 6 hours PRN  albuterol/ipratropium for Nebulization 3 milliLiter(s) Nebulizer every 12 hours  amLODIPine   Tablet 5 milliGRAM(s) Oral daily  atorvastatin 20 milliGRAM(s) Oral at bedtime  carvedilol 12.5 milliGRAM(s) Oral every 12 hours  chlorhexidine 0.12% Liquid 15 milliLiter(s) Oral Mucosa every 12 hours  chlorhexidine 2% Cloths 1 Application(s) Topical <User Schedule>  dextrose 10% Bolus 125 milliLiter(s) IV Bolus once  dextrose 5%. 1000 milliLiter(s) IV Continuous <Continuous>  dextrose 5%. 1000 milliLiter(s) IV Continuous <Continuous>  dextrose 50% Injectable 12.5 Gram(s) IV Push once  dextrose 50% Injectable 25 Gram(s) IV Push once  dextrose Oral Gel 15 Gram(s) Oral once PRN  epoetin reilly (EPOGEN) Injectable 26714 Unit(s) IV Push <User Schedule>  ferrous    sulfate Liquid 300 milliGRAM(s) Enteral Tube daily  glucagon  Injectable 1 milliGRAM(s) IntraMuscular once  hydrALAZINE 100 milliGRAM(s) Oral three times a day  insulin lispro (ADMELOG) corrective regimen sliding scale   SubCutaneous every 6 hours  insulin NPH human recombinant 2 Unit(s) SubCutaneous every 6 hours  melatonin 3 milliGRAM(s) Oral at bedtime PRN  pantoprazole  Injectable 40 milliGRAM(s) IV Push every 12 hours  sodium bicarbonate 650 milliGRAM(s) Oral every 8 hours  sodium chloride 0.9% lock flush 10 milliLiter(s) IV Push every 1 hour PRN  sodium chloride 3%  Inhalation 4 milliLiter(s) Inhalation every 12 hours    Antimicrobials:

## 2024-05-28 NOTE — PROGRESS NOTE ADULT - SUBJECTIVE AND OBJECTIVE BOX
Cardiovascular Disease Progress Note  DATE OF SERVICE: 24 @ 10:07    Overnight events: No acute events overnight.    The patient is in no distress on mechanical ventilation via trach.   Otherwise review of systems negative    Objective Findings:  T(C): 36.7 (24 @ 04:00), Max: 37.4 (24 @ 16:00)  HR: 60 (24 @ 09:53) (60 - 79)  BP: 167/67 (24 @ 04:00) (134/72 - 169/71)  RR: 12 (24 @ 04:00) (12 - 22)  SpO2: 100% (24 @ 09:53) (100% - 100%)  Wt(kg): --  Daily     Daily Weight in k (27 May 2024 12:30)      Physical Exam:  Gen: NAD; Patient resting comfortably  HEENT: EOMI, Normocephalic/ atraumatic  CV: RRR, normal S1 + S2, no m/r/g  Lungs:  Normal respiratory effort; clear to auscultation bilaterally  Abd: soft, non-tender; bowel sounds present  Ext: No edema; warm and well perfused    Telemetry: n/a    Laboratory Data:                        8.2    5.92  )-----------( 389      ( 28 May 2024 05:25 )             25.0         137  |  96<L>  |  22  ----------------------------<  113<H>  3.2<L>   |  25  |  3.96<H>    Ca    8.1<L>      28 May 2024 05:25  Phos  3.5       Mg     2.10           PTT - ( 26 May 2024 15:33 )  PTT:77.0 sec          Inpatient Medications:  MEDICATIONS  (STANDING):  albuterol/ipratropium for Nebulization 3 milliLiter(s) Nebulizer every 12 hours  atorvastatin 20 milliGRAM(s) Oral at bedtime  carvedilol 12.5 milliGRAM(s) Oral every 12 hours  chlorhexidine 0.12% Liquid 15 milliLiter(s) Oral Mucosa every 12 hours  chlorhexidine 2% Cloths 1 Application(s) Topical <User Schedule>  dextrose 10% Bolus 125 milliLiter(s) IV Bolus once  dextrose 5%. 1000 milliLiter(s) (50 mL/Hr) IV Continuous <Continuous>  dextrose 5%. 1000 milliLiter(s) (100 mL/Hr) IV Continuous <Continuous>  dextrose 50% Injectable 12.5 Gram(s) IV Push once  dextrose 50% Injectable 25 Gram(s) IV Push once  epoetin reilly (EPOGEN) Injectable 42367 Unit(s) IV Push <User Schedule>  ferrous    sulfate Liquid 300 milliGRAM(s) Enteral Tube daily  glucagon  Injectable 1 milliGRAM(s) IntraMuscular once  hydrALAZINE 100 milliGRAM(s) Oral three times a day  insulin lispro (ADMELOG) corrective regimen sliding scale   SubCutaneous every 6 hours  insulin NPH human recombinant 2 Unit(s) SubCutaneous every 6 hours  pantoprazole  Injectable 40 milliGRAM(s) IV Push every 12 hours  sodium bicarbonate 650 milliGRAM(s) Oral every 8 hours  sodium chloride 3%  Inhalation 4 milliLiter(s) Inhalation every 12 hours      Assessment: 73 year old man with HTN, HLD, T2DM on insulin, and ESRD on HD presents with supply demand ischemia and angina.    Plan of Care:    #Type II myocardial infarction-  Secondary to distributive shock earlier on admission.   The repeat echo shows no new wall motion abnormality.   I would not pursue cath at this time given recurrent aspiration and chronic respiratory failure s/p trach .   The patient is optimized from a cardiac standpoint to proceed with fistulogram once cleared from ID.        #Sinus bradycardia-  No evidence of AV block on admission EKG or telemetry.  No indication for pacing at this time.   - Continue Coreg with holding parameters.     #HTN-  BP elevated   HD underway.           #ESRD-  HD as per renal     #DVT   - R common Iliac DVT  - Hep GTT  - AC management as per vascular team.    #ACP (advance care planning)-  Advanced care planning was discussed with the patient.  Advance care planning forms were discussed. Risks, benefits and alternatives of medical treatment and procedures were discussed in detail and all questions were answered.   30 additional minutes spent addressing advance care plans.      Over 55 minutes spent on total encounter; more than 50% of the visit was spent counseling and/or coordinating care by the attending physician.      Geovani Bravo MD [facility]  Cardiovascular Disease  [phone]

## 2024-05-28 NOTE — PROGRESS NOTE ADULT - ASSESSMENT
74 YO M with PMHx of ESRD on HD MWF, HTN, and 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 Baclofen started and course complicated by AMS second to baclofen toxicity requiring intubation and MICU transfer. Course further complicated by LEFT pontine and cerebellar stroke, R AVF stenosis, aspiration and B Cereus bacteremia and RLE DVT. s/p trach and transferred to RCU 5/16    NEUROLOGY  # AMS   - MRI HEAD (5/6) with punctate foci in the left talya and left cerebellum with concern for acute infarct.   - MRA HEAD and NECK (5/6) with no large vessel occlusion or stenosis.  - Continue on aspirin and hold DAPT for now pending procedures   - Continue on Lipitor for medical management   - Supportive care     CARDIOVASCULAR  # CP with NSTEMI vs demand ischemia with HTN   - Admitted for CP and HTN with peaked T WAVES and ECG with ST deviation on admission   - Troponins elevated on admission with negative CKMB   - TTE (4/30) with EF 72, normal LVRVSF, and no regional wall motion abnormalities   - Case discussed with cardiology and no acute need for cath. DAPT loaded on 5/4 and plan for medical management with ASA, lipitor and heparin GTT.   - Hold Heparin GTT in s/o GIB as below (5/27)    # Septic vs vasoplegic shock  # Hx of HTN   - Home BP medications held and pressors weaned off   - Continue on coreg 12.5 and hydralazine 100 TID   - Monitor BP with goal 150/90s    RESPIRATORY  # Respiratory failure   - AMS noted with baclofen toxicity requiring intubation   - Attempted extubation in MICU however course complicated by aspiration and prolonged course and now s/p tracheostomy with IP on 5/14  - Continue on vent 12/500/5/30 and able to tolerate SBT 10/5 x several hours but limited by weakness   - Continue on nebs and HTS Q12H for now     GI  # Dysphagia   - s/p surgical PEG 5/17   - Continue on tube feeds via PEG     # GIB  - Noted with several episodes of black stool with drop in Hgb (5/26 overnight) requiring PRBC with appropraite response  - GI following, no plan for scope at this time  - C/w PPI BID & restart feeds at TRICKLE rate  - C/w HOLDING HEPARIN GTT  - CBC BID    RENAL  # ESRD on HD MWF with R BCF  # Fistula stenosis ?  - VA AVF (5/2) with Right radiocephalic arteriovenous fistula has no hemodynamically significant stenosis present at the anastomotic site ( cm/sec, EDV 49 cm/sec). The usable segment is partially thrombosed with minimal patency.  - Required R FEMORAL line on medicine, then removed on 5/2, and replaced with R IJ SHILEY on 5/3 for CRRT then converted back to iHD MWF   - R SHILEY removed on 5/10 second to bacteremia and replaced on 5/13   - Continue on HD MWF per Renal   - Continue on Renvela 1600   - Continue on HCO3 650 tabs   - RIJ Shiley removed 5/27 for LINE HOLIDAY in s/o new fever pending NEG BCx   - Plan for Fistulogram still pending    INFECTIOUS DISEASE  # Aspiration   - Recurrent fever spike and CXR with RLL opacity   - BCx and SCx negative, however fever spikes improved on ABX   - Initially planned for Zosyn empirically (5/18 - 5/27) x 10 day course however given new fever with new L-sided haziness poss layering PLEF (5/25) will extend course (5/18 - )   - F/u RPT BCx, SCx sent    # B Cereus Bacteremia   - Course complicated by fevers and aspiration event   - MRSA (5/1) negative   - BCx (5/2) negative   - SCx (5/4) and BAL (5/12) negative   - BCx (5/10) with bacillus cereus and cleared on (5/12).   - Case discussed with ID and s/p Vancomycin through 5/21 x 10 day course.     HEME  # AOCD with ESRD   - Anemia panel with AOCD and low # sat   - Transfused on 5/16   - EPO 10K continued on TIW   - Ferrous supplement added   - Monitor HH     VASCULAR   # RLE DVT   - CT AP (5/12) with nonocclusive RIGHT common iliac vein deep venous thrombosis  - Initially started on a Heparin GTT with course c/b GIB so now heparin on hold (5/27)   - RLE precautions   - IR consulted both for Shiley replacement post line holiday & for IVC filter placement    ENDOCRINE  # IDDM2 A1C 6.9  - Continue on NPH 4U q 6 and ISS - reduced to 2u while on TRICKLE feeds (5/27)  - Monitor and adjust insulin based on FS     SKIN  - R FEMORAL (5/1-5/2)   - R IJ SHILEY (5/3-5/10)   - R IJ SHILEY (5/13 - 5/27)     ETHICS/ GOC    - FULL CODE     DISPO - Vent facility    74 YO M with PMHx of ESRD on HD MWF, HTN, and 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 Baclofen started and course complicated by AMS second to baclofen toxicity requiring intubation and MICU transfer. Course further complicated by LEFT pontine and cerebellar stroke, R AVF stenosis, aspiration and B Cereus bacteremia and RLE DVT. s/p trach and transferred to RCU 5/16    NEUROLOGY  # AMS   - MRI HEAD (5/6) with punctate foci in the left talya and left cerebellum with concern for acute infarct.   - MRA HEAD and NECK (5/6) with no large vessel occlusion or stenosis.  - Continue on aspirin and hold DAPT for now pending procedures   - Continue on Lipitor for medical management   - Supportive care   - d/c 1:1 today, no SI    CARDIOVASCULAR  # CP with NSTEMI vs demand ischemia with HTN   - Admitted for CP and HTN with peaked T WAVES and ECG with ST deviation on admission   - Troponins elevated on admission with negative CKMB   - TTE (4/30) with EF 72, normal LVRVSF, and no regional wall motion abnormalities   - Case discussed with cardiology and no acute need for cath. DAPT loaded on 5/4 and plan for medical management with ASA, lipitor and heparin GTT.   - Hold Heparin GTT in s/o GIB as below (5/27)    # Septic vs vasoplegic shock  # Hx of HTN   - Home BP medications held and pressors weaned off   - Continue on coreg 12.5 and hydralazine 100 TID  - Added Amlodipine 5mg qd on 5/28  - Monitor BP with goal 150/90s    RESPIRATORY  # Respiratory failure   - AMS noted with baclofen toxicity requiring intubation   - Attempted extubation in MICU however course complicated by aspiration and prolonged course and now s/p tracheostomy with IP on 5/14  - Continue on vent 12/500/5/30 and able to tolerate SBT 10/5 x several hours but limited by weakness   - Continue on nebs and HTS Q12H for now     GI  # Dysphagia   - s/p surgical PEG 5/17   - Continue on tube feeds via PEG     # GIB  - Noted with several episodes of black stool with drop in Hgb (5/26 overnight) requiring PRBC with appropraite response  - GI following, no plan for scope at this time  - c/w PPI BID & restart feeds at TRICKLE rate  - c/w HOLDING HEPARIN GTT  - H/H Stable for now    RENAL  # ESRD on HD MWF with R BCF  # Fistula stenosis ?  - VA AVF (5/2) with Right radiocephalic arteriovenous fistula has no hemodynamically significant stenosis present at the anastomotic site ( cm/sec, EDV 49 cm/sec). The usable segment is partially thrombosed with minimal patency.  - Required R FEMORAL line on medicine, then removed on 5/2, and replaced with R IJ SHILEY on 5/3 for CRRT then converted back to iHD MWF   - R SHILEY removed on 5/10 second to bacteremia and replaced on 5/13   - Continue on HD MWF per Renal   - Continue on Renvela 1600   - Continue on HCO3 650 tabs   - RIJ Shiley removed 5/27 for LINE HOLIDAY in s/o new fever pending NEG BCx   - Plan for Fistulogram still pending    INFECTIOUS DISEASE  # Aspiration   - Recurrent fever spike and CXR with RLL opacity   - s/p Zosyn empirically (5/18 - 5/28) - ID following, recs appreciated   - RPT BCx NGTD, SCx sent    # B Cereus Bacteremia   - Course complicated by fevers and aspiration event   - MRSA (5/1) negative   - BCx (5/2) negative   - SCx (5/4) and BAL (5/12) negative   - BCx (5/10) with bacillus cereus and cleared on (5/12).   - Case discussed with ID and s/p Vancomycin through 5/21 x 10 day course.   - Monitor off Abx for now    HEME  # AOCD with ESRD   - Anemia panel with AOCD and low # sat   - Transfused on 5/16   - EPO 10K continued on TIW   - Ferrous supplement added   - Monitor HH     VASCULAR   # RLE DVT   - CT AP (5/12) with nonocclusive RIGHT common iliac vein deep venous thrombosis  - Initially started on a Heparin GTT with course c/b GIB so now heparin on hold (5/27)    - IR consulted both for Shiley replacement post line holiday & for IVC filter placement  - RLE Doppler - No evidence of deep vein thrombosis in the visualized right lower extremity    ENDOCRINE  # IDDM2 A1C 6.9  - c/w NPH 4U q 6 and ISS  - Monitor and adjust insulin based on FS     SKIN  - R FEMORAL (5/1-5/2)   - R IJ SHILEY (5/3-5/10)   - AMAIRANI OWENS (5/13 - 5/27)     ETHICS/ GOC    - FULL CODE     DISPO - Vent facility

## 2024-05-28 NOTE — PROGRESS NOTE ADULT - ASSESSMENT
73M w/ PMHx hypertension, ESRD on HD via R radiocephalic fistula (MWF), DM, HTN, p/w hiccups and peaked T waves, admitted to MICU for c/f baclofen toxicity. Patient received 1x HD on admission. R femoral shiley removed 5/2, had 2 RRT for AMS and failed HD via AVF 5/3. S/p emergent R IJ shiley placement 5/3, started CRRT which is now transitioned back to iHD. Vascular planning RUE fistulogram when medically optimized. Extubated to HFNC then reintubated 5/12 for c/f aspiration w/ hypoxic resp failure. S/p trach 5/14. Downgraded from MICU to RCU 5/16.    Recommendations:   - Planned fistulogram 5/26 cancelled for fever 5/25 - 5/26 overnight - BCx NGTD, tracheal aspirate w/ rare GNRs, normal resp dominick  - F/u repeat duplex of AVF and vein mapping for possible re-intervention planning  - Will proceed w/ fistulogram when the patient is once again medically optimized and re-risk stratified  - Hep gtt for nonocclusive R common iliac DVT on hold given melena/concern for UGIB - conservative management per GI at this time  - R IJ shiley removed 5/27 for line holiday  - Rest of care per primary    Vascular Surgery  [id number]

## 2024-05-28 NOTE — PROGRESS NOTE ADULT - SUBJECTIVE AND OBJECTIVE BOX
CHIEF COMPLAINT: Patient is a 73y old  Male who presents with a chief complaint of Unstable angina, abn EKG (28 May 2024 14:27)      Interval Events:    REVIEW OF SYSTEMS:  [ ] All other systems negative  [ ] Unable to assess ROS because ________    Mode: CPAP with PS, FiO2: 30, PEEP: 5, PS: 5, MAP: 7, PIP: 11      OBJECTIVE:  ICU Vital Signs Last 24 Hrs  T(C): 36.8 (28 May 2024 12:25), Max: 36.8 (28 May 2024 08:21)  T(F): 98.2 (28 May 2024 12:25), Max: 98.2 (28 May 2024 08:21)  HR: 85 (28 May 2024 15:31) (60 - 85)  BP: 167/67 (28 May 2024 12:25) (134/72 - 180/68)  BP(mean): 93 (28 May 2024 12:25) (93 - 96)  ABP: --  ABP(mean): --  RR: 17 (28 May 2024 12:25) (12 - 17)  SpO2: 98% (28 May 2024 15:31) (98% - 100%)    O2 Parameters below as of 28 May 2024 12:25  Patient On (Oxygen Delivery Method): ventilator    O2 Concentration (%): 30    Mode: CPAP with PS, FiO2: 30, PEEP: 5, PS: 5, MAP: 7, PIP: 11    05-27 @ 07:01  -  05-28 @ 07:00  --------------------------------------------------------  IN: 520 mL / OUT: 2400 mL / NET: -1880 mL    CAPILLARY BLOOD GLUCOSE    POCT Blood Glucose.: 138 mg/dL (28 May 2024 12:26)    HOSPITAL MEDICATIONS:  MEDICATIONS  (STANDING):  albuterol/ipratropium for Nebulization 3 milliLiter(s) Nebulizer every 12 hours  amLODIPine   Tablet 5 milliGRAM(s) Oral daily  atorvastatin 20 milliGRAM(s) Oral at bedtime  carvedilol 12.5 milliGRAM(s) Oral every 12 hours  chlorhexidine 0.12% Liquid 15 milliLiter(s) Oral Mucosa every 12 hours  chlorhexidine 2% Cloths 1 Application(s) Topical <User Schedule>  dextrose 10% Bolus 125 milliLiter(s) IV Bolus once  dextrose 5%. 1000 milliLiter(s) (100 mL/Hr) IV Continuous <Continuous>  dextrose 5%. 1000 milliLiter(s) (50 mL/Hr) IV Continuous <Continuous>  dextrose 50% Injectable 12.5 Gram(s) IV Push once  dextrose 50% Injectable 25 Gram(s) IV Push once  epoetin reilly (EPOGEN) Injectable 63838 Unit(s) IV Push <User Schedule>  ferrous    sulfate Liquid 300 milliGRAM(s) Enteral Tube daily  glucagon  Injectable 1 milliGRAM(s) IntraMuscular once  hydrALAZINE 100 milliGRAM(s) Oral three times a day  insulin lispro (ADMELOG) corrective regimen sliding scale   SubCutaneous every 6 hours  insulin NPH human recombinant 2 Unit(s) SubCutaneous every 6 hours  pantoprazole  Injectable 40 milliGRAM(s) IV Push every 12 hours  sodium bicarbonate 650 milliGRAM(s) Oral every 8 hours  sodium chloride 3%  Inhalation 4 milliLiter(s) Inhalation every 12 hours    MEDICATIONS  (PRN):  acetaminophen   Oral Liquid .. 650 milliGRAM(s) Oral every 6 hours PRN Temp greater or equal to 38C (100.4F), Moderate Pain (4 - 6)  dextrose Oral Gel 15 Gram(s) Oral once PRN Blood Glucose LESS THAN 70 milliGRAM(s)/deciliter  melatonin 3 milliGRAM(s) Oral at bedtime PRN Insomnia  sodium chloride 0.9% lock flush 10 milliLiter(s) IV Push every 1 hour PRN Pre/post blood products, medications, blood draw, and to maintain line patency      LABS:                        8.2    5.92  )-----------( 389      ( 28 May 2024 05:25 )             25.0     05-28    137  |  96<L>  |  22  ----------------------------<  113<H>  3.2<L>   |  25  |  3.96<H>    Ca    8.1<L>      28 May 2024 05:25  Phos  3.5     05-28  Mg     2.10     05-28    Urinalysis Basic - ( 28 May 2024 05:25 )    Color: x / Appearance: x / SG: x / pH: x  Gluc: 113 mg/dL / Ketone: x  / Bili: x / Urobili: x   Blood: x / Protein: x / Nitrite: x   Leuk Esterase: x / RBC: x / WBC x   Sq Epi: x / Non Sq Epi: x / Bacteria: x    PAST MEDICAL & SURGICAL HISTORY:  Diabetes    Benign essential HTN    HLD (hyperlipidemia)    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    ESRD on hemodialysis    Arteriovenous fistula    FAMILY HISTORY:  FHx: diabetes mellitus (Father, Aunt)    Social History:    RADIOLOGY:  [ ] Reviewed and interpreted by me    PULMONARY FUNCTION TESTS:    EKG: CHIEF COMPLAINT: Patient is a 73y old  Male who presents with a chief complaint of Unstable angina, abn EKG (28 May 2024 14:27)    Interval Events: None reported overnight. Clinically stable. No new complaints. Pt reports doing fine, denies SI, or any pain at this time. He states that he would like to go home as soon as possible.                        Will d/c 1:1. As per ID, ok to discontinue Zosyn today, and monitor off abx. Line holiday for now. Vascular following.                        Rpt vascular studry of RLE, no evidence of DVT.                         TF increased, so far tolerated well, will monitor closely                         Uncontrolled BP, will add Amlodipine 5mg qd today    REVIEW OF SYSTEMS:  See above  [x] All other systems negative  [x] Unable to assess ROS because poor phonation     Mode: CPAP with PS, FiO2: 30, PEEP: 5, PS: 5, MAP: 7, PIP: 11    OBJECTIVE:  ICU Vital Signs Last 24 Hrs  T(C): 36.8 (28 May 2024 12:25), Max: 36.8 (28 May 2024 08:21)  T(F): 98.2 (28 May 2024 12:25), Max: 98.2 (28 May 2024 08:21)  HR: 85 (28 May 2024 15:31) (60 - 85)  BP: 167/67 (28 May 2024 12:25) (134/72 - 180/68)  BP(mean): 93 (28 May 2024 12:25) (93 - 96)  ABP: --  ABP(mean): --  RR: 17 (28 May 2024 12:25) (12 - 17)  SpO2: 98% (28 May 2024 15:31) (98% - 100%)    O2 Parameters below as of 28 May 2024 12:25  Patient On (Oxygen Delivery Method): ventilator    O2 Concentration (%): 30    Mode: CPAP with PS, FiO2: 30, PEEP: 5, PS: 5, MAP: 7, PIP: 11    05-27 @ 07:01  -  05-28 @ 07:00  --------------------------------------------------------  IN: 520 mL / OUT: 2400 mL / NET: -1880 mL    CAPILLARY BLOOD GLUCOSE    POCT Blood Glucose.: 138 mg/dL (28 May 2024 12:26)    HOSPITAL MEDICATIONS:  MEDICATIONS  (STANDING):  albuterol/ipratropium for Nebulization 3 milliLiter(s) Nebulizer every 12 hours  amLODIPine   Tablet 5 milliGRAM(s) Oral daily  atorvastatin 20 milliGRAM(s) Oral at bedtime  carvedilol 12.5 milliGRAM(s) Oral every 12 hours  chlorhexidine 0.12% Liquid 15 milliLiter(s) Oral Mucosa every 12 hours  chlorhexidine 2% Cloths 1 Application(s) Topical <User Schedule>  dextrose 10% Bolus 125 milliLiter(s) IV Bolus once  dextrose 5%. 1000 milliLiter(s) (100 mL/Hr) IV Continuous <Continuous>  dextrose 5%. 1000 milliLiter(s) (50 mL/Hr) IV Continuous <Continuous>  dextrose 50% Injectable 12.5 Gram(s) IV Push once  dextrose 50% Injectable 25 Gram(s) IV Push once  epoetin reilly (EPOGEN) Injectable 26182 Unit(s) IV Push <User Schedule>  ferrous    sulfate Liquid 300 milliGRAM(s) Enteral Tube daily  glucagon  Injectable 1 milliGRAM(s) IntraMuscular once  hydrALAZINE 100 milliGRAM(s) Oral three times a day  insulin lispro (ADMELOG) corrective regimen sliding scale   SubCutaneous every 6 hours  insulin NPH human recombinant 2 Unit(s) SubCutaneous every 6 hours  pantoprazole  Injectable 40 milliGRAM(s) IV Push every 12 hours  sodium bicarbonate 650 milliGRAM(s) Oral every 8 hours  sodium chloride 3%  Inhalation 4 milliLiter(s) Inhalation every 12 hours    MEDICATIONS  (PRN):  acetaminophen   Oral Liquid .. 650 milliGRAM(s) Oral every 6 hours PRN Temp greater or equal to 38C (100.4F), Moderate Pain (4 - 6)  dextrose Oral Gel 15 Gram(s) Oral once PRN Blood Glucose LESS THAN 70 milliGRAM(s)/deciliter  melatonin 3 milliGRAM(s) Oral at bedtime PRN Insomnia  sodium chloride 0.9% lock flush 10 milliLiter(s) IV Push every 1 hour PRN Pre/post blood products, medications, blood draw, and to maintain line patency    PHYSICAL EXAM  General: Laying in bed comfortably, NAD  HEENT: AT/NC, +Trach, area c/d/i,  HEART: +s1, s2   LUNGS: Non labored breathing. +coarse breath sounds noted  GI: +BS, soft, +PEG, area c/d/i, no peritoneal signs noted    MSK: +trace edema b/l lower ext, no swelling   Derm: as per RN assessment sheet   NEURO: Alert and awake, non focal, following commands    LABS:                        8.2    5.92  )-----------( 389      ( 28 May 2024 05:25 )             25.0     05-28    137  |  96<L>  |  22  ----------------------------<  113<H>  3.2<L>   |  25  |  3.96<H>    Ca    8.1<L>      28 May 2024 05:25  Phos  3.5     05-28  Mg     2.10     05-28    Urinalysis Basic - ( 28 May 2024 05:25 )    Color: x / Appearance: x / SG: x / pH: x  Gluc: 113 mg/dL / Ketone: x  / Bili: x / Urobili: x   Blood: x / Protein: x / Nitrite: x   Leuk Esterase: x / RBC: x / WBC x   Sq Epi: x / Non Sq Epi: x / Bacteria: x    PAST MEDICAL & SURGICAL HISTORY:  Diabetes    Benign essential HTN    HLD (hyperlipidemia)    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    ESRD on hemodialysis    Arteriovenous fistula    FAMILY HISTORY:  FHx: diabetes mellitus (Father, Aunt)    Social History:    RADIOLOGY:  [ ] Reviewed and interpreted by me    PULMONARY FUNCTION TESTS:    EKG:

## 2024-05-28 NOTE — PROGRESS NOTE ADULT - SUBJECTIVE AND OBJECTIVE BOX
Neurology Progress Note    S: Patient seen and examined.       Medications: MEDICATIONS  (STANDING):  albuterol/ipratropium for Nebulization 3 milliLiter(s) Nebulizer every 12 hours  amLODIPine   Tablet 5 milliGRAM(s) Oral daily  atorvastatin 20 milliGRAM(s) Oral at bedtime  carvedilol 12.5 milliGRAM(s) Oral every 12 hours  chlorhexidine 0.12% Liquid 15 milliLiter(s) Oral Mucosa every 12 hours  chlorhexidine 2% Cloths 1 Application(s) Topical <User Schedule>  dextrose 10% Bolus 125 milliLiter(s) IV Bolus once  dextrose 5%. 1000 milliLiter(s) (50 mL/Hr) IV Continuous <Continuous>  dextrose 5%. 1000 milliLiter(s) (100 mL/Hr) IV Continuous <Continuous>  dextrose 50% Injectable 12.5 Gram(s) IV Push once  dextrose 50% Injectable 25 Gram(s) IV Push once  epoetin reilly (EPOGEN) Injectable 77716 Unit(s) IV Push <User Schedule>  ferrous    sulfate Liquid 300 milliGRAM(s) Enteral Tube daily  glucagon  Injectable 1 milliGRAM(s) IntraMuscular once  hydrALAZINE 100 milliGRAM(s) Oral three times a day  insulin lispro (ADMELOG) corrective regimen sliding scale   SubCutaneous every 6 hours  insulin NPH human recombinant 2 Unit(s) SubCutaneous every 6 hours  pantoprazole  Injectable 40 milliGRAM(s) IV Push every 12 hours  sodium bicarbonate 650 milliGRAM(s) Oral every 8 hours  sodium chloride 3%  Inhalation 4 milliLiter(s) Inhalation every 12 hours    MEDICATIONS  (PRN):  acetaminophen   Oral Liquid .. 650 milliGRAM(s) Oral every 6 hours PRN Temp greater or equal to 38C (100.4F), Moderate Pain (4 - 6)  dextrose Oral Gel 15 Gram(s) Oral once PRN Blood Glucose LESS THAN 70 milliGRAM(s)/deciliter  melatonin 3 milliGRAM(s) Oral at bedtime PRN Insomnia  sodium chloride 0.9% lock flush 10 milliLiter(s) IV Push every 1 hour PRN Pre/post blood products, medications, blood draw, and to maintain line patency       Vitals:  Vital Signs Last 24 Hrs  T(C): 36.8 (28 May 2024 12:25), Max: 37.4 (27 May 2024 16:00)  T(F): 98.2 (28 May 2024 12:25), Max: 99.4 (27 May 2024 16:00)  HR: 65 (28 May 2024 12:25) (60 - 79)  BP: 167/67 (28 May 2024 12:25) (134/72 - 180/68)  BP(mean): 93 (28 May 2024 12:25) (93 - 96)  RR: 17 (28 May 2024 12:25) (12 - 22)  SpO2: 100% (28 May 2024 12:25) (100% - 100%)    Parameters below as of 28 May 2024 12:25  Patient On (Oxygen Delivery Method): ventilator    O2 Concentration (%): 30              General Exam:   General Appearance: + trach  Head: Normocephalic, atraumatic and no dysmorphic features  Ear, Nose, and Throat: Moist mucous membranes  CVS: S1S2+  Resp: mechanical  Abd: soft NTND  Extremities: No edema, no cyanosis  Skin: No bruises, no rashes     Neurological Exam:  Mental Status: Opens to voice, tracks, follows simple commands. Mouths words  Cranial Nerves: pupils 1-2mm, R facial palsy with smile  Motor: normal tone, RUE and RLE drift.  Sensation:  intact      I personally reviewed the below data/images/labs:    LABS:                          8.2    5.92  )-----------( 389      ( 28 May 2024 05:25 )             25.0     05-28    137  |  96<L>  |  22  ----------------------------<  113<H>  3.2<L>   |  25  |  3.96<H>    Ca    8.1<L>      28 May 2024 05:25  Phos  3.5     05-28  Mg     2.10     05-28        PTT - ( 26 May 2024 15:33 )  PTT:77.0 sec  Urinalysis Basic - ( 28 May 2024 05:25 )    Color: x / Appearance: x / SG: x / pH: x  Gluc: 113 mg/dL / Ketone: x  / Bili: x / Urobili: x   Blood: x / Protein: x / Nitrite: x   Leuk Esterase: x / RBC: x / WBC x   Sq Epi: x / Non Sq Epi: x / Bacteria: x                CT Head No Cont:  (01 May 2024 18:11)  < from: CT Head No Cont (05.01.24 @ 18:11) >    ACC: [id number] EXAM:  CT BRAIN   ORDERED BY: SHELBY MOREL     PROCEDURE DATE:  05/01/2024          INTERPRETATION:  CT OF THE HEAD WITHOUT CONTRAST    CLINICAL INDICATION: Lethargy.    TECHNIQUE: Volumetric CT acquisition was performed through the brain and   reviewed using brain and bone window technique.      COMPARISON: CT head from 1/17/2024    FINDINGS:    The ventricular and sulcal size and configuration is age appropriate.     There is no acute loss of gray-white differentiation. Stable bilateral   inferior frontal encephalomalacia and gliosis likely related to remote   trauma.    There is no evidence of mass effect, midline shift, acute intracranial   hemorrhage, or extra-axial collections.     The calvarium is intact. The paranasalsinuses are clear.The mastoid air   cells are predominantly clear. The orbits are unremarkable.      IMPRESSION:  No acute intracranial hemorrhage or acute territorial infarction. Chronic   findings as above.    --- End of Report ---          GILDARDO KHAN; Resident Radiologist  This document has been electronically signed.  LADARIUS DENNY MD; Attending Radiologist  This document has been electronically signed. May  1 2024  7:54PM    < end of copied text >      EEG Classification / Summary:  Abnormal EEG in the awake, drowsy states.   -Generalized sharp waves with triphasic morphology, initially appearing as frequent GPDs at 1-1.5 Hz, decreasing in prevalence later in recording.  -Variable moderate to mild diffuse slowing.  -No electrographic seizures are captured.     Clinical Impression:  -Generalized sharp waves with triphasic morphology can be seen in toxic-metabolic encephalopathies and indicate some risk of generalized seizures, especially when at higher frequencies than in this study. These decrease in prevalence over the course of recording.  -Variable moderate to mild diffuse cerebral dysfunction is nonspecific in etiology.   -No seizures x2 days of recording.    m< from: MR Head w/wo IV Cont (05.06.24 @ 18:10) >    ACC: [id number] EXAM:  MR BRAIN WAW IC   ORDERED BY: DOLORES QUICK     PROCEDURE DATE:  05/06/2024          INTERPRETATION:  CLINICAL INDICATION: Altered mental status.    TECHNIQUE: Multi-planar, multi-sequence magnetic resonance imaging of the   brain was performed with and without intravenous contrast.    CONTRAST: Post-contrast MR images were obtained following infusion of 5   mL of Gadavist    COMPARISON: No prior studies are available for comparison    FINDINGS:    VENTRICLES AND SULCI:  Normal.  INTRA-AXIAL: Punctate foci of restricted diffusion in the left talya and   left cerebellum with associated T2 prolongation consistent with acute   infarcts. No acute intracranial hemorrhage. Encephalomalacia/gliosis   noted in the inferior frontal lobes. No abnormal enhancement. There are   patchy and confluent foci of hyperintense T2 signal within the   subcortical and periventricular white matter which are nonspecific but   likely related to chronic microvascular ischemic disease.  EXTRA-AXIAL:  No mass or collection.  VISUALIZED SINUSES: Mild polypoid mucosal thickening. Fluid noted in the   nasopharynx.  VISUALIZED MASTOIDS:  Clear.  CALVARIUM:  Normal.  CAROTID FLOW VOIDS: Normal.  MISCELLANEOUS:  None.    IMPRESSION: PUNCTATE FOCI OF RESTRICTED DIFFUSION IN THE LEFT TALYA AND   LEFT CEREBELLUM WITH ASSOCIATED T2 PROLONGATION CONSISTENT WITH ACUTE   INFARCTS. NO ACUTE INTRACRANIAL HEMORRHAGE. NO AREA OF ABNORMAL   ENHANCEMENT.    < end of copied text >    m< from: MR Angio Head No Cont (05.09.24 @ 15:58) >    ACC: [id number] EXAM:  MR ANGIO NECK WAW IC   ORDERED BY: OMAR NESBITT     ACC: [id number] EXAM:  MR ANGIO BRAIN   ORDERED BY: OMAR NESBITT     PROCEDURE DATE:  05/09/2024          INTERPRETATION:  .    CLINICAL INFORMATION: Stroke workup. Talya/cerebellar stroke.    TECHNIQUE: MRA images through the neck and [place] were obtained   using a combination of 2-D and 3-D time-of-flight acquisition. Post   contrast MR angiography of the neck was also performed. The data was then   reformatted intoa volumetric data set and reviewed as rotational MIP   images. 5 cc's of IV Gadavist was administered without immediate   complication. 2.5 cc's was discarded.    COMPARISON: Prior head CT exam dated 5/1/2024.    FINDINGS:    MRA Neck: There is a standard anatomic configuration to the aortic arch.    The origins of the great vessels appear unremarkable. The bilateral   common carotid arteries and carotid bifurcations appear unremarkable.    The bilateral cervical internal carotid arteries are within normal limits.    The origins of the bilateral vertebral arteries are normal. The bilateral   cervical vertebral arteries are normal in course and caliber.    MRA [place]: The bilateral intracranial internal carotid,   anterior, and middle cerebral arteries appear unremarkable.    The anterior and bilateral posterior communicating arteries are notable.    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 Otherwise, the   bilateral intradural vertebral arteries, vertebrobasilar junction,   basilar artery, and basilar tip appear unremarkable as well as the   bilateral posterior cerebral arteries.    IMPRESSION: No large vessel occlusion or stenosis.    < end of copied text >

## 2024-05-28 NOTE — PROGRESS NOTE ADULT - ASSESSMENT
74 y/o M PMhx HTN, ESRD (on HD M/W/F), DM who presented with hiccups x 2 days and chest pain found to have peaked T waves. Hospital course c/b AMS s/p RRT on 5/1 and 5/2. Vascular consulted 2/2 RUE AVF access unable to be used s/p R femoral shiley placed for emergent HD. Transferred to MICU and intubated 5/2 for airway protection.   received empiric course of zosyn x 5 days, completed 5/7 and meropenem x 5 days, completed 5/14  B. cereus bacteremia, s/p vanc, completed 5/20    fever  abd non-tender  pt denies  symptoms  possible RLL consolidation on CXR  afebrile x 48 hours  R non-tunneled HD catheter removed  blood cultures- NGTD  s/p zosyn x 7 days, completed 5/27    AMS, CVA  TTE- no evidence of valvular disease  s/p LP 5/2, viral encephalitis/ meningitis ruled out  MRI- Punctate foci of restricted diffusion in the left talya and left cerebellum with associated T2 prolongation consistent with acute infarcts  s/p trach 5/14, s/p PEG 5/17    DVT  CT- Nonocclusive R common iliac vein deep venous thrombosis.    Recommendations  discontinue zosyn   monitor off antibiotics  line holiday w/ plans for IR line placement    Steven Torres M.D.  [facility], Division of Infectious Diseases  [phone]  After 5pm on weekdays and all day on weekends - please call [phone]

## 2024-05-28 NOTE — PROGRESS NOTE ADULT - ASSESSMENT
pt with melena on a/c  pt had recent peg  overall progonsis poor  endoscopy / gastrostomy by surgery  no ulcer dcomeneted  conservateiv gi magnemtn

## 2024-05-28 NOTE — PROGRESS NOTE ADULT - ASSESSMENT
73-year-old male past medical history hypertension, ESRD on dialysis Monday Wednesday Friday, diabetes insulin-dependent presents with hiccups patient endorses persistent hiccups for the last 2 days. Nephrology consulted for HD needs.    A/P  ESRD:  Center: [facility]  Nephrologist: Dr. Hardwick  Access: R AVF  MWF schedule  Vascular for fistulogram   s/p femoral shiley on 5/1, now removed  s/p placement of IJ shiley 5/3  Stopped CRRT   Restarted IHD  s/p HD 5/7 UF 1778; treatment terminated early due to hypotension   S/P MRA head/neck w/ gadolinium --> Received HD on 5/9 and 5/10.  5/10 Will need to dialyze 3 days consecutively--> plan for HD on 5/11 5/11 shiley removed due to bacteremia; did not receive HD.  Line holiday  5/12 - BUN worsened; K up trending.    5/13 Shiley placed.  5/17: s/p PEG placement.   5/21 IR consulted for shiley exchange; IR recommended to keep current shiley in place, with pending fistulogram   5/25 - Pt still did not go for fistulogram d/t scheduling issues.  Please exchange shiley that was placed on 5/13 or switch to PC. D/W team.  5/27 Shiley removed  Continue HD MWF   s/p HD 5/27 UF 2L   Consent obtained and placed in ED chart  Renal diet  Monitor BMP  consider [facility] for [facility]hab where his outpatient Nephro-Dr. Hardwick can follow him    Hyperkalemia/Hypokalemia  Improved w/ HD.  C/W HD schedule as above.  Lokelma 10gm PRN for K >5.3 on non-HD days  PhosNaK given 5/21 and again 5/23.  K stable.  Low K diet.  Monitor closely     HTN:  BP fluctuating. BP   On carvedilol 12.5mg BID, hydralazine 100mg TID.  Currently off nifedipine --> consider restarting nifedipine @ 30mg if BP remains suboptimal.  UF w/ HD as BP permits.  Monitor BP.    Anemia:  Hgb low.  + iron deficiency.  Tsat 13% - on ferrous sulfate 300mg qd via PEG.  Will hold venofer for now in view of up trending leukocytosis.  SILVA w/ HD.  Tranfuse for Hgb <7.  Monitor Hb.    CKD-MBD  Check PTH  PO4 increasing again  Sevelamer 1600mg TID d/c'ed 5/21.  S/p PhosNaK x2 doses 5/23.  PO4 WNL.  Monitor Ca, PO4 daily    Hypocalcemia:  In setting of CKD vs. hypoalbuminemia.  Optimize albumin.  Corrected Ca WNL.  Replete as needed.  Monitor Ca.

## 2024-05-28 NOTE — PROGRESS NOTE ADULT - ASSESSMENT
73M PMHx HTN, ESRD, IDDM p/w hiccups and started on baclofen with concern for AMS overnight and desaturation, ?cheye nascimento respirations. Labs with numerous metabolic derangements. CTH with bifrontal encephalomalacia, likley traumatic.   WBC inc significantly, now intubated. Exam limited as on propofol, also on pressors.   s/p LP for meningitis concerns however appears bland - not on antibiotics  EEG with GPDs, no seizures  MR brain with punctuate L pontine and L cerebellar infarcts  MRA H/N with mild basilar fenestration, otherwise neg  mental status improving post extubation but now reintubated for recurrent aspiration   s/p trach, neurologically improving  o/e with R hemiparesis, mental status markedly improved    AMS of unclear etiology - ? baclofen toxicity, no evidence of seizures. 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 - embolic appearing  Intractable hiccups  hypoxic resp failure  ESRD on HD  B cereus bacteremia, likely contaminant  Nonocclusive R common iliac DVT  UGIB    MRI, MRA reviewed, as above - R hemiparesis on exam likely related to known L sided strokes - previously giving poor effort on exam   cont aspirin 81mg  TTE reviewed, no need to repeat for now  Keppra started for GPDs, no improvement in mental status - now better off keppra  continue to monitor off Keppra, avoid baclofen and reglan  s/p trach, peg   HD per nephro  f/u remainder of CSF - negative  s/p Zosyn, Meropenem for pna   Abx per ID  on hep gtt for DVT  on hold for UGIB  GI following   fistulogam per vascular  respeat infectious w/u per ID

## 2024-05-29 LAB
ANION GAP SERPL CALC-SCNC: 16 MMOL/L — HIGH (ref 7–14)
ANION GAP SERPL CALC-SCNC: 17 MMOL/L — HIGH (ref 7–14)
BASOPHILS # BLD AUTO: 0.06 K/UL — SIGNIFICANT CHANGE UP (ref 0–0.2)
BASOPHILS NFR BLD AUTO: 0.6 % — SIGNIFICANT CHANGE UP (ref 0–2)
BUN SERPL-MCNC: 33 MG/DL — HIGH (ref 7–23)
BUN SERPL-MCNC: 42 MG/DL — HIGH (ref 7–23)
CALCIUM SERPL-MCNC: 7.9 MG/DL — LOW (ref 8.4–10.5)
CALCIUM SERPL-MCNC: 8.3 MG/DL — LOW (ref 8.4–10.5)
CHLORIDE SERPL-SCNC: 96 MMOL/L — LOW (ref 98–107)
CHLORIDE SERPL-SCNC: 96 MMOL/L — LOW (ref 98–107)
CO2 SERPL-SCNC: 24 MMOL/L — SIGNIFICANT CHANGE UP (ref 22–31)
CO2 SERPL-SCNC: 25 MMOL/L — SIGNIFICANT CHANGE UP (ref 22–31)
CREAT SERPL-MCNC: 5.29 MG/DL — HIGH (ref 0.5–1.3)
CREAT SERPL-MCNC: 5.82 MG/DL — HIGH (ref 0.5–1.3)
EGFR: 10 ML/MIN/1.73M2 — LOW
EGFR: 11 ML/MIN/1.73M2 — LOW
EOSINOPHIL # BLD AUTO: 0.27 K/UL — SIGNIFICANT CHANGE UP (ref 0–0.5)
EOSINOPHIL NFR BLD AUTO: 2.7 % — SIGNIFICANT CHANGE UP (ref 0–6)
GLUCOSE BLDC GLUCOMTR-MCNC: 146 MG/DL — HIGH (ref 70–99)
GLUCOSE BLDC GLUCOMTR-MCNC: 160 MG/DL — HIGH (ref 70–99)
GLUCOSE BLDC GLUCOMTR-MCNC: 196 MG/DL — HIGH (ref 70–99)
GLUCOSE BLDC GLUCOMTR-MCNC: 214 MG/DL — HIGH (ref 70–99)
GLUCOSE SERPL-MCNC: 137 MG/DL — HIGH (ref 70–99)
GLUCOSE SERPL-MCNC: 152 MG/DL — HIGH (ref 70–99)
HCT VFR BLD CALC: 24 % — LOW (ref 39–50)
HCT VFR BLD CALC: 27.6 % — LOW (ref 39–50)
HGB BLD-MCNC: 7.7 G/DL — LOW (ref 13–17)
HGB BLD-MCNC: 8.7 G/DL — LOW (ref 13–17)
IANC: 8.54 K/UL — HIGH (ref 1.8–7.4)
IMM GRANULOCYTES NFR BLD AUTO: 0.4 % — SIGNIFICANT CHANGE UP (ref 0–0.9)
LYMPHOCYTES # BLD AUTO: 0.67 K/UL — LOW (ref 1–3.3)
LYMPHOCYTES # BLD AUTO: 6.6 % — LOW (ref 13–44)
MAGNESIUM SERPL-MCNC: 2.2 MG/DL — SIGNIFICANT CHANGE UP (ref 1.6–2.6)
MAGNESIUM SERPL-MCNC: 2.5 MG/DL — SIGNIFICANT CHANGE UP (ref 1.6–2.6)
MCHC RBC-ENTMCNC: 21.7 PG — LOW (ref 27–34)
MCHC RBC-ENTMCNC: 21.7 PG — LOW (ref 27–34)
MCHC RBC-ENTMCNC: 31.5 GM/DL — LOW (ref 32–36)
MCHC RBC-ENTMCNC: 32.1 GM/DL — SIGNIFICANT CHANGE UP (ref 32–36)
MCV RBC AUTO: 67.6 FL — LOW (ref 80–100)
MCV RBC AUTO: 68.8 FL — LOW (ref 80–100)
MONOCYTES # BLD AUTO: 0.6 K/UL — SIGNIFICANT CHANGE UP (ref 0–0.9)
MONOCYTES NFR BLD AUTO: 5.9 % — SIGNIFICANT CHANGE UP (ref 2–14)
NEUTROPHILS # BLD AUTO: 8.54 K/UL — HIGH (ref 1.8–7.4)
NEUTROPHILS NFR BLD AUTO: 83.8 % — HIGH (ref 43–77)
NRBC # BLD: 0 /100 WBCS — SIGNIFICANT CHANGE UP (ref 0–0)
NRBC # BLD: 0 /100 WBCS — SIGNIFICANT CHANGE UP (ref 0–0)
NRBC # FLD: 0 K/UL — SIGNIFICANT CHANGE UP (ref 0–0)
NRBC # FLD: 0 K/UL — SIGNIFICANT CHANGE UP (ref 0–0)
PHOSPHATE SERPL-MCNC: 3.6 MG/DL — SIGNIFICANT CHANGE UP (ref 2.5–4.5)
PHOSPHATE SERPL-MCNC: 4.6 MG/DL — HIGH (ref 2.5–4.5)
PLATELET # BLD AUTO: 362 K/UL — SIGNIFICANT CHANGE UP (ref 150–400)
PLATELET # BLD AUTO: 363 K/UL — SIGNIFICANT CHANGE UP (ref 150–400)
POTASSIUM SERPL-MCNC: 3.1 MMOL/L — LOW (ref 3.5–5.3)
POTASSIUM SERPL-MCNC: 4 MMOL/L — SIGNIFICANT CHANGE UP (ref 3.5–5.3)
POTASSIUM SERPL-SCNC: 3.1 MMOL/L — LOW (ref 3.5–5.3)
POTASSIUM SERPL-SCNC: 4 MMOL/L — SIGNIFICANT CHANGE UP (ref 3.5–5.3)
RBC # BLD: 3.55 M/UL — LOW (ref 4.2–5.8)
RBC # BLD: 4.01 M/UL — LOW (ref 4.2–5.8)
RBC # FLD: 22.3 % — HIGH (ref 10.3–14.5)
RBC # FLD: 22.5 % — HIGH (ref 10.3–14.5)
SODIUM SERPL-SCNC: 137 MMOL/L — SIGNIFICANT CHANGE UP (ref 135–145)
SODIUM SERPL-SCNC: 137 MMOL/L — SIGNIFICANT CHANGE UP (ref 135–145)
WBC # BLD: 10.18 K/UL — SIGNIFICANT CHANGE UP (ref 3.8–10.5)
WBC # BLD: 10.59 K/UL — HIGH (ref 3.8–10.5)
WBC # FLD AUTO: 10.18 K/UL — SIGNIFICANT CHANGE UP (ref 3.8–10.5)
WBC # FLD AUTO: 10.59 K/UL — HIGH (ref 3.8–10.5)

## 2024-05-29 PROCEDURE — 99233 SBSQ HOSP IP/OBS HIGH 50: CPT

## 2024-05-29 RX ORDER — HEPARIN SODIUM 5000 [USP'U]/ML
5000 INJECTION INTRAVENOUS; SUBCUTANEOUS EVERY 12 HOURS
Refills: 0 | Status: DISCONTINUED | OUTPATIENT
Start: 2024-05-29 | End: 2024-06-02

## 2024-05-29 RX ORDER — POTASSIUM CHLORIDE 20 MEQ
40 PACKET (EA) ORAL EVERY 4 HOURS
Refills: 0 | Status: COMPLETED | OUTPATIENT
Start: 2024-05-29 | End: 2024-05-29

## 2024-05-29 RX ADMIN — AMLODIPINE BESYLATE 5 MILLIGRAM(S): 2.5 TABLET ORAL at 05:17

## 2024-05-29 RX ADMIN — HUMAN INSULIN 4 UNIT(S): 100 INJECTION, SUSPENSION SUBCUTANEOUS at 11:29

## 2024-05-29 RX ADMIN — PANTOPRAZOLE SODIUM 40 MILLIGRAM(S): 20 TABLET, DELAYED RELEASE ORAL at 18:09

## 2024-05-29 RX ADMIN — Medication 100 MILLIGRAM(S): at 11:26

## 2024-05-29 RX ADMIN — SODIUM CHLORIDE 4 MILLILITER(S): 9 INJECTION INTRAMUSCULAR; INTRAVENOUS; SUBCUTANEOUS at 08:38

## 2024-05-29 RX ADMIN — Medication 2: at 18:08

## 2024-05-29 RX ADMIN — SODIUM CHLORIDE 4 MILLILITER(S): 9 INJECTION INTRAMUSCULAR; INTRAVENOUS; SUBCUTANEOUS at 19:16

## 2024-05-29 RX ADMIN — Medication 300 MILLIGRAM(S): at 11:26

## 2024-05-29 RX ADMIN — CHLORHEXIDINE GLUCONATE 15 MILLILITER(S): 213 SOLUTION TOPICAL at 18:09

## 2024-05-29 RX ADMIN — Medication 3 MILLILITER(S): at 19:16

## 2024-05-29 RX ADMIN — Medication 650 MILLIGRAM(S): at 21:34

## 2024-05-29 RX ADMIN — CHLORHEXIDINE GLUCONATE 15 MILLILITER(S): 213 SOLUTION TOPICAL at 05:17

## 2024-05-29 RX ADMIN — CHLORHEXIDINE GLUCONATE 1 APPLICATION(S): 213 SOLUTION TOPICAL at 05:18

## 2024-05-29 RX ADMIN — Medication 100 MILLIGRAM(S): at 03:02

## 2024-05-29 RX ADMIN — ATORVASTATIN CALCIUM 20 MILLIGRAM(S): 80 TABLET, FILM COATED ORAL at 21:34

## 2024-05-29 RX ADMIN — Medication 650 MILLIGRAM(S): at 18:26

## 2024-05-29 RX ADMIN — PANTOPRAZOLE SODIUM 40 MILLIGRAM(S): 20 TABLET, DELAYED RELEASE ORAL at 05:17

## 2024-05-29 RX ADMIN — CARVEDILOL PHOSPHATE 12.5 MILLIGRAM(S): 80 CAPSULE, EXTENDED RELEASE ORAL at 07:34

## 2024-05-29 RX ADMIN — HUMAN INSULIN 4 UNIT(S): 100 INJECTION, SUSPENSION SUBCUTANEOUS at 18:08

## 2024-05-29 RX ADMIN — Medication 4: at 11:30

## 2024-05-29 RX ADMIN — CARVEDILOL PHOSPHATE 12.5 MILLIGRAM(S): 80 CAPSULE, EXTENDED RELEASE ORAL at 18:17

## 2024-05-29 RX ADMIN — Medication 100 MILLIGRAM(S): at 18:17

## 2024-05-29 RX ADMIN — Medication 3 MILLILITER(S): at 08:38

## 2024-05-29 RX ADMIN — HEPARIN SODIUM 5000 UNIT(S): 5000 INJECTION INTRAVENOUS; SUBCUTANEOUS at 18:15

## 2024-05-29 RX ADMIN — Medication 650 MILLIGRAM(S): at 05:17

## 2024-05-29 RX ADMIN — Medication 40 MILLIEQUIVALENT(S): at 11:30

## 2024-05-29 RX ADMIN — HUMAN INSULIN 4 UNIT(S): 100 INJECTION, SUSPENSION SUBCUTANEOUS at 05:16

## 2024-05-29 NOTE — PROGRESS NOTE ADULT - NS ATTEND AMEND GEN_ALL_CORE FT
-    73M PMH ESRD on HD, HTN, and DM2 presented to [facility] on 4/29/24 with chest pressure and hiccups, admitted 2/2 concern for demand ischemia with hospital course c/b baclofen toxicity and acute ischemic CVA left talya/cerebellum c/b acute hypoxemic and hypercapnic respiratory failure s/p ETT intubation/MV with failure to wean from ventilator s/p tracheostomy. Hospital course further complicated by aspiration and B cereus bacteremia and RLE DVT. Now transferred to RCU 5/16 for further management.     #Neuro: improved acute encephalopathy due to baclofen toxicity. MRI head 5/6 with left talya and left cerebellum ischemic infarcts with no vessel occlusion/stenosis on MRA. Will need to restart aspirin if H/H remains stable and resolved melena. Continue statin. F/up neuro  #CV: HD stable, continue amlodipine, carvedilol, and hydralazine for HTN. Start amlodipine 5 mg qd  #Pulm: continue lung protective ventilation, now doing well on pressure support 5/5 30%. Trach collar trial as tolerates. Airway clearance therapy with albuterol-ipratropium nebs + 3%NaCl nebs  #GI: monitor melena, now improved. H/H stable, start HSQ for DVT ppx and advance to full a/c with heparin gtt if no further bleeding. Continue PPI BID. Continue PEG feeds  #Renal: ESRD, plan for fistulogram with vascular surgery on Friday. Also with plan for IR permacath on Friday pending results of fistulogram. Last HD 5/27. Labs today without urgent need for HD. Monitor labs q12h. Patient is medically optimized to proceed with planned interventions  #ID: completed course of Zosyn 5/28 for aspiration pneumonia and B. cereus bacteremia. F/up ID. Monitor for recurrence of fevers  #Heme: non-occlusive R common iliac DVT on CT A/P, not seen on RLE duplex. Start HSQ for DVT ppx. Restart heparin gtt and aspirin after vascular/IR interventions on 5/31 if H/H remains stable and melena improves  #Dispo: full code, discussed with patient at bedside

## 2024-05-29 NOTE — PROGRESS NOTE ADULT - SUBJECTIVE AND OBJECTIVE BOX
[facility], Division of Infectious Diseases  AUGUST Carbajal Y. Patel, S. Shah, G. Brian  [phone]  ([phone] - weekdays after 5pm and weekends)    Name: JIMMY BLAND  Age/Gender: 73y Male  MRN: [id number]    Interval History:  Notes reviewed.   No concerning overnight events.  Afebrile.     Allergies: No Known Allergies      Objective:  Vitals:   T(F): 98.4 (05-29-24 @ 04:00), Max: 99 (05-28-24 @ 20:00)  HR: 65 (05-29-24 @ 09:48) (65 - 85)  BP: 122/80 (05-29-24 @ 04:00) (122/80 - 203/62)  RR: 18 (05-29-24 @ 04:00) (17 - 18)  SpO2: 100% (05-29-24 @ 09:48) (98% - 100%)  Physical Examination:  General: no acute distress  HEENT: anicteric, tracheostomy  Lungs: clear to auscultation anteriorly  Heart: S1, S2, normal rate  Abdomen: Soft. ND. NT  Extremities: No LE edema.   Skin: Warm. Dry.     Laboratory Studies:  CBC:                       7.7    10.59 )-----------( 362      ( 29 May 2024 05:10 )             24.0     WBC Trend:  10.59 05-29-24 @ 05:10  7.69 05-28-24 @ 19:00  5.92 05-28-24 @ 05:25  7.04 05-27-24 @ 19:22  8.62 05-27-24 @ 05:45  8.65 05-26-24 @ 21:09  9.28 05-26-24 @ 19:49  12.15 05-26-24 @ 08:38  12.60 05-26-24 @ 01:20  12.54 05-25-24 @ 18:45  11.35 05-25-24 @ 06:35  11.93 05-24-24 @ 03:30  11.06 05-23-24 @ 00:15    CMP: 05-29    137  |  96<L>  |  33<H>  ----------------------------<  137<H>  3.1<L>   |  25  |  5.29<H>    Ca    7.9<L>      29 May 2024 05:10  Phos  3.6     05-29  Mg     2.20     05-29            Urinalysis Basic - ( 29 May 2024 05:10 )    Color: x / Appearance: x / SG: x / pH: x  Gluc: 137 mg/dL / Ketone: x  / Bili: x / Urobili: x   Blood: x / Protein: x / Nitrite: x   Leuk Esterase: x / RBC: x / WBC x   Sq Epi: x / Non Sq Epi: x / Bacteria: x      Microbiology: reviewed     Culture - Sputum (collected 05-25-24 @ 21:28)  Source: Trach Asp Tracheal Aspirate  Gram Stain (05-26-24 @ 13:57):    Few polymorphonuclear leukocytes per low power field    Rare Gram Negative Rods per oil power field  Final Report (05-27-24 @ 18:28):    Normal Respiratory Jocelyn present    Culture - Blood (collected 05-25-24 @ 19:00)  Source: .Blood Blood-Peripheral  Preliminary Report (05-28-24 @ 22:01):    No growth at 72 Hours    Culture - Blood (collected 05-25-24 @ 18:45)  Source: .Blood Blood-Peripheral  Preliminary Report (05-28-24 @ 22:01):    No growth at 72 Hours    Culture - Sputum (collected 05-18-24 @ 04:30)  Source: .Sputum Sputum  Gram Stain (05-18-24 @ 21:05):    Few polymorphonuclear leukocytes per low power field    No Squamous epithelial cells per low power field    No organisms seen per oil power field  Final Report (05-20-24 @ 07:02):    Normal Respiratory Jocelyn present    Culture - Blood (collected 05-18-24 @ 04:05)  Source: .Blood Blood-Venous  Final Report (05-23-24 @ 11:00):    No growth at 5 days    Culture - Blood (collected 05-18-24 @ 03:45)  Source: .Blood Blood-Venous  Final Report (05-23-24 @ 09:00):    No growth at 5 days        Radiology: reviewed     Medications:  acetaminophen   Oral Liquid .. 650 milliGRAM(s) Oral every 6 hours PRN  albuterol/ipratropium for Nebulization 3 milliLiter(s) Nebulizer every 12 hours  amLODIPine   Tablet 5 milliGRAM(s) Oral daily  atorvastatin 20 milliGRAM(s) Oral at bedtime  carvedilol 12.5 milliGRAM(s) Oral every 12 hours  chlorhexidine 0.12% Liquid 15 milliLiter(s) Oral Mucosa every 12 hours  chlorhexidine 2% Cloths 1 Application(s) Topical <User Schedule>  dextrose 10% Bolus 125 milliLiter(s) IV Bolus once  dextrose 5%. 1000 milliLiter(s) IV Continuous <Continuous>  dextrose 5%. 1000 milliLiter(s) IV Continuous <Continuous>  dextrose 50% Injectable 12.5 Gram(s) IV Push once  dextrose 50% Injectable 25 Gram(s) IV Push once  dextrose Oral Gel 15 Gram(s) Oral once PRN  epoetin reilly (EPOGEN) Injectable 11336 Unit(s) IV Push <User Schedule>  ferrous    sulfate Liquid 300 milliGRAM(s) Enteral Tube daily  glucagon  Injectable 1 milliGRAM(s) IntraMuscular once  hydrALAZINE 100 milliGRAM(s) Oral three times a day  insulin lispro (ADMELOG) corrective regimen sliding scale   SubCutaneous every 6 hours  insulin NPH human recombinant 4 Unit(s) SubCutaneous every 6 hours  melatonin 3 milliGRAM(s) Oral at bedtime PRN  pantoprazole  Injectable 40 milliGRAM(s) IV Push every 12 hours  potassium chloride   Powder 40 milliEquivalent(s) Oral every 4 hours  sodium bicarbonate 650 milliGRAM(s) Oral every 8 hours  sodium chloride 0.9% lock flush 10 milliLiter(s) IV Push every 1 hour PRN  sodium chloride 3%  Inhalation 4 milliLiter(s) Inhalation every 12 hours    Antimicrobials:

## 2024-05-29 NOTE — CHART NOTE - NSCHARTNOTEFT_GEN_A_CORE
Pt seen for SEVERE MALNUTRITION FOLLOW UP     Medical Course:  72 y/o male PMHx HTN, ESRD, IDDM p/w hiccups and started on baclofen with concern for AMS overnight and desaturation, ?cheye nascimento respirations. Labs with numerous metabolic derangements. CTH with bifrontal encephalomalacia, likley traumatic.       Nutrition Course:   Unable to conduct nutrition interview 2/2 decreased cognition. Information obtained from comprehensive chart review and per RN today: No recent episodes of nausea, vomiting, diarrhea, or constipation. Last BM noted 5/29 per RN flowsheets. Continues on Banatrol 1x/day to aide in normal BMs and promote gut health.     Pt remains NPO TF only as sole source of nutrition and hydration; Nepro @ 45ml/hr x24hrs (52kg) (1080ml total vol, 1944 kcal (37kcal/kg), 87 gm protein (1.7gm/kg), 788ml free water from formula). No TF intolerances noted per RN today.       Diet Prescription: Diet, NPO with Tube Feed:   Tube Feeding Modality: Gastrostomy  Nepro with Carb Steady (NEPRORTH)  Continuous  Starting Tube Feed Rate {mL per Hour}: 10  Increase Tube Feed Rate by (mL): 10     Every 6 hours  Until Goal Tube Feed Rate (mL per Hour): 45  Banatrol TF     Qty per Day:  1 (05-28-24 @ 11:39)    Pertinent Medications: MEDICATIONS  (STANDING):  albuterol/ipratropium for Nebulization 3 milliLiter(s) Nebulizer every 12 hours  amLODIPine   Tablet 5 milliGRAM(s) Oral daily  atorvastatin 20 milliGRAM(s) Oral at bedtime  carvedilol 12.5 milliGRAM(s) Oral every 12 hours  chlorhexidine 0.12% Liquid 15 milliLiter(s) Oral Mucosa every 12 hours  chlorhexidine 2% Cloths 1 Application(s) Topical <User Schedule>  dextrose 10% Bolus 125 milliLiter(s) IV Bolus once  dextrose 5%. 1000 milliLiter(s) (50 mL/Hr) IV Continuous <Continuous>  dextrose 5%. 1000 milliLiter(s) (100 mL/Hr) IV Continuous <Continuous>  dextrose 50% Injectable 12.5 Gram(s) IV Push once  dextrose 50% Injectable 25 Gram(s) IV Push once  epoetin reilly (EPOGEN) Injectable 69881 Unit(s) IV Push <User Schedule>  ferrous    sulfate Liquid 300 milliGRAM(s) Enteral Tube daily  glucagon  Injectable 1 milliGRAM(s) IntraMuscular once  heparin   Injectable 5000 Unit(s) SubCutaneous every 12 hours  hydrALAZINE 100 milliGRAM(s) Oral three times a day  insulin lispro (ADMELOG) corrective regimen sliding scale   SubCutaneous every 6 hours  insulin NPH human recombinant 4 Unit(s) SubCutaneous every 6 hours  pantoprazole  Injectable 40 milliGRAM(s) IV Push every 12 hours  sodium bicarbonate 650 milliGRAM(s) Oral every 8 hours  sodium chloride 3%  Inhalation 4 milliLiter(s) Inhalation every 12 hours    MEDICATIONS  (PRN):  acetaminophen   Oral Liquid .. 650 milliGRAM(s) Oral every 6 hours PRN Temp greater or equal to 38C (100.4F), Moderate Pain (4 - 6)  dextrose Oral Gel 15 Gram(s) Oral once PRN Blood Glucose LESS THAN 70 milliGRAM(s)/deciliter  melatonin 3 milliGRAM(s) Oral at bedtime PRN Insomnia  sodium chloride 0.9% lock flush 10 milliLiter(s) IV Push every 1 hour PRN Pre/post blood products, medications, blood draw, and to maintain line patency    Pertinent Labs: 05-29 Na137 mmol/L Glu 137 mg/dL<H> K+ 3.1 mmol/L<L> Cr  5.29 mg/dL<H> BUN 33 mg/dL<H> 05-29 Phos 3.6 mg/dL 05-26 Alb 2.4 g/dL<L> 05-17 Chol 86 mg/dL LDL --    HDL 27 mg/dL<L> Trig 151 mg/dL<H>    POCT Blood Glucose.: 214 mg/dL (29 May 2024 11:13)  POCT Blood Glucose.: 146 mg/dL (29 May 2024 05:09)  POCT Blood Glucose.: 168 mg/dL (28 May 2024 23:42)  POCT Blood Glucose.: 151 mg/dL (28 May 2024 17:19)      Weight: Height (cm): 172.7 (04-29 @ 23:49)  Weight (kg): 51.8 (05-20)  BMI (kg/m2): 17.6 (04-29 @ 23:49)  Weight Assessment: No new weight to assess      Physical Assessment, per flowsheets:  Edema: None noted   Skin: intact     Estimated Needs:   [X] No change since previous assessment    Previous Nutrition Diagnosis: [ x] Malnutrition   Nutrition Diagnosis is [ x] ongoing  [ ] resolved [ ] not applicable   New Nutrition Diagnosis: [ x] not applicable     Education:  [  ] Given today        Type of education provided:   [  ] Given on previous assessment by RD   [ x ] Not applicable 2/2 cognitive deficit  [  ] Pt refusal of education offered   [  ] Not applicable 2/2 current prognosis  [  ] Not warranted at present    Interventions:   1) Continue on current TF regimen: Nepro @ 45ml/hr x24hrs  2) Continue on banatrol 1x/day to aide in gut health and normal BMs  3) Continue on ferrous sulfate for mineral provisions  4) RD to f/u prn     Monitor & Evaluate:   tolerance to diet/supplement, nutrition related lab values, weight trends, BMs/GI distress, hydration status, skin integrity.    Emerald Krishnan MS, RDN (Pager #[phone]) | Also available on TEAMS

## 2024-05-29 NOTE — CHART NOTE - NSCHARTNOTEFT_GEN_A_CORE
IR Chart Note:    73y Male with pmh of ESRD requiring HD, currently via a right internal jugular vein non tunneled HD catheter ,recently removed given recent febrile episodes, undergoing line holiday . IR is following patient for re-insertion of tunnelled HD catheter insertion.  Recent blood cultures show NGTD x 72 hours.     IR also asked to assess for IVC placement in setting of melena with left common iliac DVT on recent CT. Per primary team, patient to resume anticoagulation. US duplex demonstrated no evidence of DVT from 5/28/24, and IVC consult was cancelled.     IR Plan:  - Case for tunnelled HD catheter placement scheduled for Friday 5/31  -Please place IR procedure order for Dr. Fisher  -Please complete IR pre procedure note  -NPO past MDN on 5/30/24  -AM labs + coags on 5/31  -Hold DVT PPX in AM   -Monitor/chart any spike in fevers  -Please have ID (already following), chart ID clearance for non-tunnelled HD catheter.     -Case d/w Dr. Natalia Garcia MD PGY3  Interventional Radiology    For EMERGENT inquiries/questions:  [facility]-[phone]  [facility]-[phone] ([phone])    Available on [employer] TEAMS for all non-urgent questions  Non-emergent consults: Please place a sunrise order "Consult-Interventional Radiology" with an appropriate callback number.    For questions about scheduling during appropriate work hours, call IR :  [facility]: [phone]  [facility]: [phone]    For outpatient IR booking:  [facility]: [phone]  [facility]: [phone]

## 2024-05-29 NOTE — PROGRESS NOTE ADULT - SUBJECTIVE AND OBJECTIVE BOX
Patient is a 73y Male     Patient is a 73y old  Male who presents with a chief complaint of Unstable angina, abn EKG (28 May 2024 16:46)      HPI:  73-year-old male past medical history hypertension, ESRD on dialysis Monday Wednesday Friday, diabetes insulin-dependent presents with hiccups patient endorses persistent hiccups for the last 2 days. found to have peaked T waves, a new finding. denies dizziness, N/V, denies CP, palps, abd pain (29 Apr 2024 21:15)      PAST MEDICAL & SURGICAL HISTORY:  Diabetes      Benign essential HTN      HLD (hyperlipidemia)      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      ESRD on hemodialysis      Arteriovenous fistula          MEDICATIONS  (STANDING):  albuterol/ipratropium for Nebulization 3 milliLiter(s) Nebulizer every 12 hours  amLODIPine   Tablet 5 milliGRAM(s) Oral daily  atorvastatin 20 milliGRAM(s) Oral at bedtime  carvedilol 12.5 milliGRAM(s) Oral every 12 hours  chlorhexidine 0.12% Liquid 15 milliLiter(s) Oral Mucosa every 12 hours  chlorhexidine 2% Cloths 1 Application(s) Topical <User Schedule>  dextrose 10% Bolus 125 milliLiter(s) IV Bolus once  dextrose 5%. 1000 milliLiter(s) (50 mL/Hr) IV Continuous <Continuous>  dextrose 5%. 1000 milliLiter(s) (100 mL/Hr) IV Continuous <Continuous>  dextrose 50% Injectable 25 Gram(s) IV Push once  dextrose 50% Injectable 12.5 Gram(s) IV Push once  epoetin reilly (EPOGEN) Injectable 07963 Unit(s) IV Push <User Schedule>  ferrous    sulfate Liquid 300 milliGRAM(s) Enteral Tube daily  glucagon  Injectable 1 milliGRAM(s) IntraMuscular once  hydrALAZINE 100 milliGRAM(s) Oral three times a day  insulin lispro (ADMELOG) corrective regimen sliding scale   SubCutaneous every 6 hours  insulin NPH human recombinant 4 Unit(s) SubCutaneous every 6 hours  pantoprazole  Injectable 40 milliGRAM(s) IV Push every 12 hours  sodium bicarbonate 650 milliGRAM(s) Oral every 8 hours  sodium chloride 3%  Inhalation 4 milliLiter(s) Inhalation every 12 hours      Allergies    No Known Allergies    Intolerances        SOCIAL HISTORY:  Denies ETOh,Smoking,     FAMILY HISTORY:  FHx: diabetes mellitus (Father, Aunt)        REVIEW OF SYSTEMS:    CONSTITUTIONAL: No weakness, fevers or chills  EYES/ENT: No visual changes;  No vertigo or throat pain   NECK: No pain or stiffness  RESPIRATORY: No cough, wheezing, hemoptysis; No shortness of breath  CARDIOVASCULAR: No chest pain or palpitations  GASTROINTESTINAL: No abdominal or epigastric pain. No nausea, vomiting, or hematemesis; No diarrhea or constipation. No melena or hematochezia.  GENITOURINARY: No dysuria, frequency or hematuria  NEUROLOGICAL: No numbness or weakness  SKIN: No itching, burning, rashes, or lesions   All other review of systems is negative unless indicated above.    VITAL:  T(C): , Max: 37.2 (05-28-24 @ 20:00)  T(F): , Max: 99 (05-28-24 @ 20:00)  HR: 67 (05-29-24 @ 07:52)  BP: 122/80 (05-29-24 @ 04:00)  BP(mean): 101 (05-28-24 @ 20:00)  RR: 18 (05-29-24 @ 04:00)  SpO2: 100% (05-29-24 @ 07:52)  Wt(kg): --    I and O's:    05-27 @ 07:01  -  05-28 @ 07:00  --------------------------------------------------------  IN: 520 mL / OUT: 2400 mL / NET: -1880 mL    05-28 @ 07:01  -  05-29 @ 07:00  --------------------------------------------------------  IN: 300 mL / OUT: 0 mL / NET: 300 mL          PHYSICAL EXAM:    Constitutional: NAD  HEENT: PERRLA,   Neck: No JVD  Respiratory: CTA B/L  Cardiovascular: S1 and S2  Gastrointestinal: BS+, soft, NT/ND  Extremities: No peripheral edema  Neurological: A/O x 3, no focal deficits  Psychiatric: Normal mood, normal affect  : No Abbasi  Skin: No rashes  Access: Not applicable  Back: No CVA tenderness    LABS:                        7.7    10.59 )-----------( 362      ( 29 May 2024 05:10 )             24.0     05-29    137  |  96<L>  |  33<H>  ----------------------------<  137<H>  3.1<L>   |  25  |  5.29<H>    Ca    7.9<L>      29 May 2024 05:10  Phos  3.6     05-29  Mg     2.20     05-29            RADIOLOGY & ADDITIONAL STUDIES:

## 2024-05-29 NOTE — PROGRESS NOTE ADULT - SUBJECTIVE AND OBJECTIVE BOX
CHIEF COMPLAINT: Patient is a 73y old  Male who presents with a chief complaint of Unstable angina, abn EKG (29 May 2024 10:17)      Interval Events:    REVIEW OF SYSTEMS:  [ ] All other systems negative  [ ] Unable to assess ROS because ________    Mode: CPAP with PS, FiO2: 30, PEEP: 5, PS: 10, MAP: 7, PIP: 15      OBJECTIVE:  ICU Vital Signs Last 24 Hrs  T(C): 36.9 (29 May 2024 04:00), Max: 37.2 (28 May 2024 20:00)  T(F): 98.4 (29 May 2024 04:00), Max: 99 (28 May 2024 20:00)  HR: 65 (29 May 2024 09:48) (65 - 85)  BP: 122/80 (29 May 2024 04:00) (122/80 - 203/62)  BP(mean): 101 (28 May 2024 20:00) (93 - 101)  ABP: --  ABP(mean): --  RR: 18 (29 May 2024 04:00) (17 - 18)  SpO2: 100% (29 May 2024 09:48) (98% - 100%)    O2 Parameters below as of 29 May 2024 09:48  Patient On (Oxygen Delivery Method): ventilator          Mode: CPAP with PS, FiO2: 30, PEEP: 5, PS: 10, MAP: 7, PIP: 15    05-28 @ 07:01  -  05-29 @ 07:00  --------------------------------------------------------  IN: 300 mL / OUT: 0 mL / NET: 300 mL      CAPILLARY BLOOD GLUCOSE      POCT Blood Glucose.: 146 mg/dL (29 May 2024 05:09)      HOSPITAL MEDICATIONS:  MEDICATIONS  (STANDING):  albuterol/ipratropium for Nebulization 3 milliLiter(s) Nebulizer every 12 hours  amLODIPine   Tablet 5 milliGRAM(s) Oral daily  atorvastatin 20 milliGRAM(s) Oral at bedtime  carvedilol 12.5 milliGRAM(s) Oral every 12 hours  chlorhexidine 0.12% Liquid 15 milliLiter(s) Oral Mucosa every 12 hours  chlorhexidine 2% Cloths 1 Application(s) Topical <User Schedule>  dextrose 10% Bolus 125 milliLiter(s) IV Bolus once  dextrose 5%. 1000 milliLiter(s) (100 mL/Hr) IV Continuous <Continuous>  dextrose 5%. 1000 milliLiter(s) (50 mL/Hr) IV Continuous <Continuous>  dextrose 50% Injectable 12.5 Gram(s) IV Push once  dextrose 50% Injectable 25 Gram(s) IV Push once  epoetin reilly (EPOGEN) Injectable 28459 Unit(s) IV Push <User Schedule>  ferrous    sulfate Liquid 300 milliGRAM(s) Enteral Tube daily  glucagon  Injectable 1 milliGRAM(s) IntraMuscular once  hydrALAZINE 100 milliGRAM(s) Oral three times a day  insulin lispro (ADMELOG) corrective regimen sliding scale   SubCutaneous every 6 hours  insulin NPH human recombinant 4 Unit(s) SubCutaneous every 6 hours  pantoprazole  Injectable 40 milliGRAM(s) IV Push every 12 hours  potassium chloride   Powder 40 milliEquivalent(s) Oral every 4 hours  sodium bicarbonate 650 milliGRAM(s) Oral every 8 hours  sodium chloride 3%  Inhalation 4 milliLiter(s) Inhalation every 12 hours    MEDICATIONS  (PRN):  acetaminophen   Oral Liquid .. 650 milliGRAM(s) Oral every 6 hours PRN Temp greater or equal to 38C (100.4F), Moderate Pain (4 - 6)  dextrose Oral Gel 15 Gram(s) Oral once PRN Blood Glucose LESS THAN 70 milliGRAM(s)/deciliter  melatonin 3 milliGRAM(s) Oral at bedtime PRN Insomnia  sodium chloride 0.9% lock flush 10 milliLiter(s) IV Push every 1 hour PRN Pre/post blood products, medications, blood draw, and to maintain line patency      LABS:                        7.7    10.59 )-----------( 362      ( 29 May 2024 05:10 )             24.0     05-29    137  |  96<L>  |  33<H>  ----------------------------<  137<H>  3.1<L>   |  25  |  5.29<H>    Ca    7.9<L>      29 May 2024 05:10  Phos  3.6     05-29  Mg     2.20     05-29        Urinalysis Basic - ( 29 May 2024 05:10 )    Color: x / Appearance: x / SG: x / pH: x  Gluc: 137 mg/dL / Ketone: x  / Bili: x / Urobili: x   Blood: x / Protein: x / Nitrite: x   Leuk Esterase: x / RBC: x / WBC x   Sq Epi: x / Non Sq Epi: x / Bacteria: x            PAST MEDICAL & SURGICAL HISTORY:  Diabetes      Benign essential HTN      HLD (hyperlipidemia)      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 CHIEF COMPLAINT: Patient is a 73y old  Male who presents with a chief complaint of Unstable angina, abn EKG (29 May 2024 10:17)    Interval Events: None reported overnight. Clinically unchanged. Pt has no new issues.                         Mild hypokalemia, s/p replenished x1. Will continue with Line holiday and BMP BID.                        Vascular is planning for Fistulogram this week, and IR is also planning Permacath placement on Friday.                         Pt started on DVT ppx w Heparin sc today, will monitor closely giving previous concern for GIB. Stool is still dark as per nursing staff.                       VSS and medications reviewed. SBP 120s, H/H 7.7/24.     REVIEW OF SYSTEMS:  See above  [x] All other systems negative    Mode: CPAP with PS, FiO2: 30, PEEP: 5, PS: 10, MAP: 7, PIP: 15    OBJECTIVE:  ICU Vital Signs Last 24 Hrs  T(C): 36.9 (29 May 2024 04:00), Max: 37.2 (28 May 2024 20:00)  T(F): 98.4 (29 May 2024 04:00), Max: 99 (28 May 2024 20:00)  HR: 65 (29 May 2024 09:48) (65 - 85)  BP: 122/80 (29 May 2024 04:00) (122/80 - 203/62)  BP(mean): 101 (28 May 2024 20:00) (93 - 101)  ABP: --  ABP(mean): --  RR: 18 (29 May 2024 04:00) (17 - 18)  SpO2: 100% (29 May 2024 09:48) (98% - 100%)    O2 Parameters below as of 29 May 2024 09:48  Patient On (Oxygen Delivery Method): ventilator    Mode: CPAP with PS, FiO2: 30, PEEP: 5, PS: 10, MAP: 7, PIP: 15    05-28 @ 07:01  -  05-29 @ 07:00  --------------------------------------------------------  IN: 300 mL / OUT: 0 mL / NET: 300 mL    CAPILLARY BLOOD GLUCOSE    POCT Blood Glucose.: 146 mg/dL (29 May 2024 05:09)    HOSPITAL MEDICATIONS:  MEDICATIONS  (STANDING):  albuterol/ipratropium for Nebulization 3 milliLiter(s) Nebulizer every 12 hours  amLODIPine   Tablet 5 milliGRAM(s) Oral daily  atorvastatin 20 milliGRAM(s) Oral at bedtime  carvedilol 12.5 milliGRAM(s) Oral every 12 hours  chlorhexidine 0.12% Liquid 15 milliLiter(s) Oral Mucosa every 12 hours  chlorhexidine 2% Cloths 1 Application(s) Topical <User Schedule>  dextrose 10% Bolus 125 milliLiter(s) IV Bolus once  dextrose 5%. 1000 milliLiter(s) (100 mL/Hr) IV Continuous <Continuous>  dextrose 5%. 1000 milliLiter(s) (50 mL/Hr) IV Continuous <Continuous>  dextrose 50% Injectable 12.5 Gram(s) IV Push once  dextrose 50% Injectable 25 Gram(s) IV Push once  epoetin reilly (EPOGEN) Injectable 35817 Unit(s) IV Push <User Schedule>  ferrous    sulfate Liquid 300 milliGRAM(s) Enteral Tube daily  glucagon  Injectable 1 milliGRAM(s) IntraMuscular once  hydrALAZINE 100 milliGRAM(s) Oral three times a day  insulin lispro (ADMELOG) corrective regimen sliding scale   SubCutaneous every 6 hours  insulin NPH human recombinant 4 Unit(s) SubCutaneous every 6 hours  pantoprazole  Injectable 40 milliGRAM(s) IV Push every 12 hours  potassium chloride   Powder 40 milliEquivalent(s) Oral every 4 hours  sodium bicarbonate 650 milliGRAM(s) Oral every 8 hours  sodium chloride 3%  Inhalation 4 milliLiter(s) Inhalation every 12 hours    MEDICATIONS  (PRN):  acetaminophen   Oral Liquid .. 650 milliGRAM(s) Oral every 6 hours PRN Temp greater or equal to 38C (100.4F), Moderate Pain (4 - 6)  dextrose Oral Gel 15 Gram(s) Oral once PRN Blood Glucose LESS THAN 70 milliGRAM(s)/deciliter  melatonin 3 milliGRAM(s) Oral at bedtime PRN Insomnia  sodium chloride 0.9% lock flush 10 milliLiter(s) IV Push every 1 hour PRN Pre/post blood products, medications, blood draw, and to maintain line patency    PHYSICAL EXAM  General: Laying in bed comfortably, NAD  HEENT: AT/NC, +Trach, area c/d/i,  HEART: +s1, s2   LUNGS: Non labored breathing. +coarse breath sounds noted  GI: +BS, soft, +PEG, area c/d/i, no peritoneal signs noted    MSK: no asymmetry, no calf tenderness   Derm: as per RN assessment sheet   NEURO: Alert and awake, non focal, following commands    LABS:                        7.7    10.59 )-----------( 362      ( 29 May 2024 05:10 )             24.0     05-29    137  |  96<L>  |  33<H>  ----------------------------<  137<H>  3.1<L>   |  25  |  5.29<H>    Ca    7.9<L>      29 May 2024 05:10  Phos  3.6     05-29  Mg     2.20     05-29    Urinalysis Basic - ( 29 May 2024 05:10 )    Color: x / Appearance: x / SG: x / pH: x  Gluc: 137 mg/dL / Ketone: x  / Bili: x / Urobili: x   Blood: x / Protein: x / Nitrite: x   Leuk Esterase: x / RBC: x / WBC x   Sq Epi: x / Non Sq Epi: x / Bacteria: x    PAST MEDICAL & SURGICAL HISTORY:  Diabetes    Benign essential HTN    HLD (hyperlipidemia)    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    ESRD on hemodialysis    Arteriovenous fistula    FAMILY HISTORY:  FHx: diabetes mellitus (Father, Aunt)    Social History:    RADIOLOGY:  [ ] Reviewed and interpreted by me    PULMONARY FUNCTION TESTS:    EKG:

## 2024-05-29 NOTE — PROGRESS NOTE ADULT - ASSESSMENT
72 y/o M PMhx HTN, ESRD (on HD M/W/F), DM who presented with hiccups x 2 days and chest pain found to have peaked T waves. Hospital course c/b AMS s/p RRT on 5/1 and 5/2. Vascular consulted 2/2 RUE AVF access unable to be used s/p R femoral shiley placed for emergent HD. Transferred to MICU and intubated 5/2 for airway protection.   received empiric course of zosyn x 5 days, completed 5/7 and meropenem x 5 days, completed 5/14  B. cereus bacteremia, s/p vanc, completed 5/20    fever- resolved  possible RLL consolidation on CXRs  R non-tunneled HD catheter removed 5/27  blood cultures- NGTD  s/p zosyn x 7 days, completed 5/27    anemia  started on protonix after melena  drop in H&H today    AMS, CVA  TTE- no evidence of valvular disease  s/p LP 5/2, viral encephalitis/ meningitis ruled out  MRI- Punctate foci of restricted diffusion in the left talya and left cerebellum with associated T2 prolongation consistent with acute infarcts  s/p trach 5/14, s/p PEG 5/17    DVT  CT- Nonocclusive R common iliac vein deep venous thrombosis.    Recommendations  monitor off antibiotics  trend CBC  no objection to IR line placement from ID perspective    Steven Torres M.D.  [facility], Division of Infectious Diseases  [phone]  After 5pm on weekdays and all day on weekends - please call [phone]

## 2024-05-29 NOTE — PROGRESS NOTE ADULT - ASSESSMENT
73-year-old male past medical history hypertension, ESRD on dialysis Monday Wednesday Friday, diabetes insulin-dependent presents with hiccups patient endorses persistent hiccups for the last 2 days. Nephrology consulted for HD needs.    A/P  ESRD:  Center: [facility]  Nephrologist: Dr. Hardwick  Access: R AVF  MWF schedule  Vascular for fistulogram   s/p femoral shiley on 5/1, now removed  s/p placement of IJ shiley 5/3  Stopped CRRT   Restarted IHD  s/p HD 5/7 UF 1778; treatment terminated early due to hypotension   S/P MRA head/neck w/ gadolinium --> Received HD on 5/9 and 5/10.  5/10 Will need to dialyze 3 days consecutively--> plan for HD on 5/11 5/11 shiley removed due to bacteremia; did not receive HD.  Line holiday  5/12 - BUN worsened; K up trending.    5/13 Shiley placed.  5/17: s/p PEG placement.   5/21 IR consulted for shiley exchange; IR recommended to keep current shiley in place, with pending fistulogram   5/25 - Pt still did not go for fistulogram d/t scheduling issues.  Please exchange shiley that was placed on 5/13 or switch to PC. D/W team.  5/27 Shiley removed  Continue HD MWF   s/p HD 5/27 UF 2L   On line holiday, no need for HD today  Consent obtained and placed in ED chart  Renal diet  Monitor BMP  consider [facility] for rehab where his outpatient Nephro-Dr. Hardwick can follow him    Hyperkalemia/Hypokalemia  Improved w/ HD.  C/W HD schedule as above.  Lokelma 10gm PRN for K >5.3 on non-HD days  PhosNaK given 5/21 and again 5/23.  K stable.  Low K diet.  Monitor closely     HTN:  BP fluctuating. BP   On carvedilol 12.5mg BID, hydralazine 100mg TID.  Currently off nifedipine --> consider restarting nifedipine @ 30mg if BP remains suboptimal.  UF w/ HD as BP permits.  Monitor BP.    Anemia:  Hgb low.  + iron deficiency.  Tsat 13% - on ferrous sulfate 300mg qd via PEG.  Will hold venofer for now in view of up trending leukocytosis.  SILVA w/ HD.  Tranfuse for Hgb <7.  Monitor Hb.    CKD-MBD  Check PTH  PO4 increasing again  Sevelamer 1600mg TID d/c'ed 5/21.  S/p PhosNaK x2 doses 5/23.  PO4 WNL.  Monitor Ca, PO4 daily    Hypocalcemia:  In setting of CKD vs. hypoalbuminemia.  Optimize albumin.  Corrected Ca WNL.  Replete as needed.  Monitor Ca.

## 2024-05-29 NOTE — PROGRESS NOTE ADULT - SUBJECTIVE AND OBJECTIVE BOX
Patient is a 73y old  Male who presents with a chief complaint of Unstable angina, abn EKG (29 May 2024 14:20)      SUBJECTIVE / OVERNIGHT EVENTS: no new events    MEDICATIONS  (STANDING):  albuterol/ipratropium for Nebulization 3 milliLiter(s) Nebulizer every 12 hours  amLODIPine   Tablet 5 milliGRAM(s) Oral daily  atorvastatin 20 milliGRAM(s) Oral at bedtime  carvedilol 12.5 milliGRAM(s) Oral every 12 hours  chlorhexidine 0.12% Liquid 15 milliLiter(s) Oral Mucosa every 12 hours  chlorhexidine 2% Cloths 1 Application(s) Topical <User Schedule>  dextrose 10% Bolus 125 milliLiter(s) IV Bolus once  dextrose 5%. 1000 milliLiter(s) (100 mL/Hr) IV Continuous <Continuous>  dextrose 5%. 1000 milliLiter(s) (50 mL/Hr) IV Continuous <Continuous>  dextrose 50% Injectable 12.5 Gram(s) IV Push once  dextrose 50% Injectable 25 Gram(s) IV Push once  epoetin reilly (EPOGEN) Injectable 28627 Unit(s) IV Push <User Schedule>  ferrous    sulfate Liquid 300 milliGRAM(s) Enteral Tube daily  glucagon  Injectable 1 milliGRAM(s) IntraMuscular once  heparin   Injectable 5000 Unit(s) SubCutaneous every 12 hours  hydrALAZINE 100 milliGRAM(s) Oral three times a day  insulin lispro (ADMELOG) corrective regimen sliding scale   SubCutaneous every 6 hours  insulin NPH human recombinant 4 Unit(s) SubCutaneous every 6 hours  pantoprazole  Injectable 40 milliGRAM(s) IV Push every 12 hours  sodium bicarbonate 650 milliGRAM(s) Oral every 8 hours  sodium chloride 3%  Inhalation 4 milliLiter(s) Inhalation every 12 hours    MEDICATIONS  (PRN):  acetaminophen   Oral Liquid .. 650 milliGRAM(s) Oral every 6 hours PRN Temp greater or equal to 38C (100.4F), Moderate Pain (4 - 6)  dextrose Oral Gel 15 Gram(s) Oral once PRN Blood Glucose LESS THAN 70 milliGRAM(s)/deciliter  melatonin 3 milliGRAM(s) Oral at bedtime PRN Insomnia  sodium chloride 0.9% lock flush 10 milliLiter(s) IV Push every 1 hour PRN Pre/post blood products, medications, blood draw, and to maintain line patency      Vital Signs Last 24 Hrs  T(F): 98.3 (05-29-24 @ 16:00), Max: 98.8 (05-29-24 @ 00:00)  HR: 63 (05-29-24 @ 19:19) (63 - 74)  BP: 177/62 (05-29-24 @ 16:00) (122/80 - 177/62)  RR: 21 (05-29-24 @ 16:06) (18 - 21)  SpO2: 98% (05-29-24 @ 19:19) (98% - 100%)  Telemetry:   CAPILLARY BLOOD GLUCOSE      POCT Blood Glucose.: 196 mg/dL (29 May 2024 17:10)  POCT Blood Glucose.: 214 mg/dL (29 May 2024 11:13)  POCT Blood Glucose.: 146 mg/dL (29 May 2024 05:09)  POCT Blood Glucose.: 168 mg/dL (28 May 2024 23:42)    I&O's Summary    28 May 2024 07:01  -  29 May 2024 07:00  --------------------------------------------------------  IN: 300 mL / OUT: 0 mL / NET: 300 mL        PHYSICAL EXAM:  GENERAL: NAD, well-developed  HEAD:  Atraumatic, Normocephalic  EYES: EOMI, PERRLA, conjunctiva and sclera clear  NECK: Supple, No JVD  CHEST/LUNG: Clear to auscultation bilaterally; No wheeze  HEART: Regular rate and rhythm; No murmurs, rubs, or gallops  ABDOMEN: Soft, Nontender, Nondistended; Bowel sounds present  EXTREMITIES:  2+ Peripheral Pulses, No clubbing, cyanosis, or edema  PSYCH: AAOx3  NEUROLOGY: non-focal  SKIN: No rashes or lesions    LABS:                        8.7    10.18 )-----------( 363      ( 29 May 2024 18:40 )             27.6     05-29    137  |  96<L>  |  42<H>  ----------------------------<  152<H>  4.0   |  24  |  5.82<H>    Ca    8.3<L>      29 May 2024 18:40  Phos  4.6     05-29  Mg     2.50     05-29            Urinalysis Basic - ( 29 May 2024 18:40 )    Color: x / Appearance: x / SG: x / pH: x  Gluc: 152 mg/dL / Ketone: x  / Bili: x / Urobili: x   Blood: x / Protein: x / Nitrite: x   Leuk Esterase: x / RBC: x / WBC x   Sq Epi: x / Non Sq Epi: x / Bacteria: x        RADIOLOGY & ADDITIONAL TESTS:    Imaging Personally Reviewed:    Consultant(s) Notes Reviewed:      Care Discussed with Consultants/Other Providers:

## 2024-05-29 NOTE — PROGRESS NOTE ADULT - ASSESSMENT
73-year-old male past medical history hypertension, ESRD on dialysis Monday Wednesday Friday, diabetes insulin-dependent presents with hiccups patient endorses persistent hiccups for the last 2 days.   found to have peaked T waves, a new finding. denied dizziness, N/V, denies CP, palps, abd pain  Upon admission seen by CArd, renal and GI  found to have a distended GB prob 2/2 gastroparesis, was started on Reglan  has received 4 doses of baclofen since 4/30 2/2 hiccups, last dose on 5/1 at 5 am  had received Haldol for agitation at 11 pm on 4/30, AMS observed in pm of 5/1  AMS ongoing, RRT x 2 called  now transferred to MICU for encephalopathy requiring  airway protection on 5/2,  intubated for airway protection, was on  propofol and pressors. now off  toxicology consulted upon dx of encephalopathy, not impressed AMS 2/2 Haldol nor baclofen . AMS was 2/2 acute CVA   HD was not done timely until femoral shiley was placed 2/2 clotted RUE AVF. now removed and RIJ shiley placed on 5/3  has been nonverbal since pm 5/1.     MR brain 5/6 c/w L pontine and L cerebellar acute infarcts, no hemorrhage . as per neuro: embolic in nature.   encephalopathy probably 2/2 acute CVA, now follows commands appropriately off sedation.   no SZ focus on EEG,   off CRRT,  HD resumed as per renal.   remains intubated, now trached  off keppra  AC resumed, on Heparin drip for R common iliac DVT  completed 5 days of empiric ZOSYN on 5/7, shortly after became septic again after an extubation trial. bacteremia w B. cereus, on Vanc through 5/20 as per ID    s/p trache placement by pulm,   IR unable to find a window for G-J tube. PEG placed by surgery 5/17, resume feeds when cleared by surgery  HD via R IJ.  Tmax overnight( 5/18)  101, cxr c/w RLL PNA, now on Zosyn, blood Cx s sent. remains on Vanco for B. cereus bacteremia   leukocytosis resolved  EKG changes on 5/3 c/w NSTEMI loaded w DAPT , was  on Heparin drip x 48 hrs. rpt TTE is unchanged  ID, Neuro , renal, cardiology following.  clotted RUE AVF.  fistulogram was cancelled on 5/25 2/2 fever  HD via RIght IJ Shiley.   LP done, no sign of meningitis.   NEUROLOGY     - CTH without acute findings   - LP done, no acute findings  - MR brain w acute infarcts: L talya and L cerebellum, nonhemorrhagic  - no Sz focus on EEG    CARDIOVASCULAR   - ptn never had CP. just peaked T waves. was awaiting ischemic study w nucl stress test  - TTE showing EF 72%, rpt unchanged  - EKG changes on 5/3, loaded w DAPT now on Heparin drip 2/2 DVT    GI  - PEG placed, npo w tube feeds  - Gastroparesis       RENAL   -  HD as per renal  - via R IJ shiley  - permacath shceduled for 5/31,   fistulogram some time this week      INFECTIOUS DISEASE     completed a course of Zosyn, then became septic, bacteremic a B. cereus, completed 3 days of Meropenem, completed vanc through 5/21  on Zosyn since 5/18 for RLL PNA, afebrile, completed 5/23  recurrent fever 5/25, ZOsyn resumed    ENDOCRINE   - DM  - cw ISS    DVT  - RLE , on Heparin drip    GOC:  Full code

## 2024-05-29 NOTE — PROGRESS NOTE ADULT - SUBJECTIVE AND OBJECTIVE BOX
Cardiovascular Disease Progress Note  DATE OF SERVICE: 05-29-24 @ 09:31    Overnight events: No acute events overnight.    The patient is in on distress on CPAP via trach.        Objective Findings:  T(C): 36.9 (05-29-24 @ 04:00), Max: 37.2 (05-28-24 @ 20:00)  HR: 67 (05-29-24 @ 07:52) (60 - 85)  BP: 122/80 (05-29-24 @ 04:00) (122/80 - 203/62)  RR: 18 (05-29-24 @ 04:00) (17 - 18)  SpO2: 100% (05-29-24 @ 07:52) (98% - 100%)  Wt(kg): --  Daily     Daily       Physical Exam:  Gen: NAD; Patient resting comfortably  HEENT: EOMI, Normocephalic/ atraumatic  CV: RRR, normal S1 + S2, no m/r/g  Lungs:  Normal respiratory effort; clear to auscultation bilaterally  Abd: soft, non-tender; bowel sounds present  Ext: No edema; warm and well perfused    Telemetry: n/a    Laboratory Data:                        7.7    10.59 )-----------( 362      ( 29 May 2024 05:10 )             24.0     05-29    137  |  96<L>  |  33<H>  ----------------------------<  137<H>  3.1<L>   |  25  |  5.29<H>    Ca    7.9<L>      29 May 2024 05:10  Phos  3.6     05-29  Mg     2.20     05-29                Inpatient Medications:  MEDICATIONS  (STANDING):  albuterol/ipratropium for Nebulization 3 milliLiter(s) Nebulizer every 12 hours  amLODIPine   Tablet 5 milliGRAM(s) Oral daily  atorvastatin 20 milliGRAM(s) Oral at bedtime  carvedilol 12.5 milliGRAM(s) Oral every 12 hours  chlorhexidine 0.12% Liquid 15 milliLiter(s) Oral Mucosa every 12 hours  chlorhexidine 2% Cloths 1 Application(s) Topical <User Schedule>  dextrose 10% Bolus 125 milliLiter(s) IV Bolus once  dextrose 5%. 1000 milliLiter(s) (100 mL/Hr) IV Continuous <Continuous>  dextrose 5%. 1000 milliLiter(s) (50 mL/Hr) IV Continuous <Continuous>  dextrose 50% Injectable 12.5 Gram(s) IV Push once  dextrose 50% Injectable 25 Gram(s) IV Push once  epoetin reilly (EPOGEN) Injectable 05810 Unit(s) IV Push <User Schedule>  ferrous    sulfate Liquid 300 milliGRAM(s) Enteral Tube daily  glucagon  Injectable 1 milliGRAM(s) IntraMuscular once  hydrALAZINE 100 milliGRAM(s) Oral three times a day  insulin lispro (ADMELOG) corrective regimen sliding scale   SubCutaneous every 6 hours  insulin NPH human recombinant 4 Unit(s) SubCutaneous every 6 hours  pantoprazole  Injectable 40 milliGRAM(s) IV Push every 12 hours  sodium bicarbonate 650 milliGRAM(s) Oral every 8 hours  sodium chloride 3%  Inhalation 4 milliLiter(s) Inhalation every 12 hours      Assessment: 73 year old man with HTN, HLD, T2DM on insulin, and ESRD on HD presents with supply demand ischemia and angina.    Plan of Care:    #Type II myocardial infarction-  Secondary to distributive shock earlier on admission.   The repeat echo shows no new wall motion abnormality.   I would not pursue cath at this time given recurrent aspiration and chronic respiratory failure s/p trach 5/14.   The patient is optimized from a cardiac standpoint to proceed with fistulogram once cleared from ID.        #Sinus bradycardia-  No evidence of AV block on admission EKG or telemetry.  No indication for pacing at this time.   - Continue Coreg with holding parameters.     #HTN-  BP elevated   HD underway.           #ESRD-  HD as per renal     #DVT   - R common Iliac DVT  - Hep GTT will monitoring for bleeding.        Over 55 minutes spent on total encounter; more than 50% of the visit was spent counseling and/or coordinating care by the attending physician.      Geovani Bravo MD [facility]  Cardiovascular Disease  [phone]

## 2024-05-29 NOTE — PROGRESS NOTE ADULT - ASSESSMENT
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 Baclofen started and course complicated by AMS second to baclofen toxicity requiring intubation and MICU transfer. Course further complicated by LEFT pontine and cerebellar stroke, R AVF stenosis, aspiration and B Cereus bacteremia and RLE DVT. s/p trach and transferred to RCU 5/16    NEUROLOGY  # AMS   - MRI HEAD (5/6) with punctate foci in the left talya and left cerebellum with concern for acute infarct.   - MRA HEAD and NECK (5/6) with no large vessel occlusion or stenosis.  - Continue on aspirin and hold DAPT for now pending procedures   - Continue on Lipitor for medical management   - Supportive care   - d/c 1:1 today, no SI    CARDIOVASCULAR  # CP with NSTEMI vs demand ischemia with HTN   - Admitted for CP and HTN with peaked T WAVES and ECG with ST deviation on admission   - Troponins elevated on admission with negative CKMB   - TTE (4/30) with EF 72, normal LVRVSF, and no regional wall motion abnormalities   - Case discussed with cardiology and no acute need for cath. DAPT loaded on 5/4 and plan for medical management with ASA, lipitor and heparin GTT.   - Hold Heparin GTT in s/o GIB as below (5/27)    # Septic vs vasoplegic shock  # Hx of HTN   - Home BP medications held and pressors weaned off   - Continue on coreg 12.5 and hydralazine 100 TID  - Added Amlodipine 5mg qd on 5/28  - Monitor BP with goal 150/90s    RESPIRATORY  # Respiratory failure   - AMS noted with baclofen toxicity requiring intubation   - Attempted extubation in MICU however course complicated by aspiration and prolonged course and now s/p tracheostomy with IP on 5/14  - Continue on vent 12/500/5/30 and able to tolerate SBT 10/5 x several hours but limited by weakness   - Continue on nebs and HTS Q12H for now     GI  # Dysphagia   - s/p surgical PEG 5/17   - Continue on tube feeds via PEG     # GIB  - Noted with several episodes of black stool with drop in Hgb (5/26 overnight) requiring PRBC with appropraite response  - GI following, no plan for scope at this time  - c/w PPI BID & restart feeds at TRICKLE rate  - c/w HOLDING HEPARIN GTT  - H/H Stable for now    RENAL  # ESRD on HD MWF with R BCF  # Fistula stenosis ?  - VA AVF (5/2) with Right radiocephalic arteriovenous fistula has no hemodynamically significant stenosis present at the anastomotic site ( cm/sec, EDV 49 cm/sec). The usable segment is partially thrombosed with minimal patency.  - Required R FEMORAL line on medicine, then removed on 5/2, and replaced with R IJ SHILEY on 5/3 for CRRT then converted back to iHD MWF   - R SHILEY removed on 5/10 second to bacteremia and replaced on 5/13   - Continue on HD MWF per Renal   - Continue on Renvela 1600   - Continue on HCO3 650 tabs   - RIJ Shiley removed 5/27 for LINE HOLIDAY in s/o new fever pending NEG BCx   - Plan for Fistulogram still pending    INFECTIOUS DISEASE  # Aspiration   - Recurrent fever spike and CXR with RLL opacity   - s/p Zosyn empirically (5/18 - 5/28) - ID following, recs appreciated   - RPT BCx NGTD, SCx sent    # B Cereus Bacteremia   - Course complicated by fevers and aspiration event   - MRSA (5/1) negative   - BCx (5/2) negative   - SCx (5/4) and BAL (5/12) negative   - BCx (5/10) with bacillus cereus and cleared on (5/12).   - Case discussed with ID and s/p Vancomycin through 5/21 x 10 day course.   - Monitor off Abx for now    HEME  # AOCD with ESRD   - Anemia panel with AOCD and low # sat   - Transfused on 5/16   - EPO 10K continued on TIW   - Ferrous supplement added   - Monitor HH     VASCULAR   # RLE DVT   - CT AP (5/12) with nonocclusive RIGHT common iliac vein deep venous thrombosis  - Initially started on a Heparin GTT with course c/b GIB so now heparin on hold (5/27)    - IR consulted both for Shiley replacement post line holiday & for IVC filter placement  - RLE Doppler - No evidence of deep vein thrombosis in the visualized right lower extremity    ENDOCRINE  # IDDM2 A1C 6.9  - c/w NPH 4U q 6 and ISS  - Monitor and adjust insulin based on FS     SKIN  - R FEMORAL (5/1-5/2)   - R IJ SHILEY (5/3-5/10)   - AMAIRANI OWENS (5/13 - 5/27)     ETHICS/ GOC    - FULL CODE     DISPO - Vent facility    74 YO M with PMHx of ESRD on HD MWF, HTN, and 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 Baclofen started and course complicated by AMS second to baclofen toxicity requiring intubation and MICU transfer. Course further complicated by LEFT pontine and cerebellar stroke, R AVF stenosis, aspiration and B Cereus bacteremia and RLE DVT. s/p trach and transferred to RCU 5/16    NEUROLOGY  # AMS   - MRI HEAD (5/6) with punctate foci in the left talya and left cerebellum with concern for acute infarct.   - MRA HEAD and NECK (5/6) with no large vessel occlusion or stenosis.  - Continue on aspirin and hold DAPT for now pending procedures   - Continue on Lipitor for medical management   - Supportive care   - d/c 1:1 on 5/28, no SI    CARDIOVASCULAR  # CP with NSTEMI vs demand ischemia with HTN   - Admitted for CP and HTN with peaked T WAVES and ECG with ST deviation on admission   - Troponins elevated on admission with negative CKMB   - TTE (4/30) with EF 72, normal LVRVSF, and no regional wall motion abnormalities   - Case discussed with cardiology and no acute need for cath. DAPT loaded on 5/4 and plan for medical management with ASA, lipitor and heparin GTT.   - Hold Heparin GTT in s/o GIB as below (5/27)  - Still noted with dark stool, will continue to hold off on Full AC for now    # Septic vs vasoplegic shock  # Hx of HTN   - Home BP medications held and pressors weaned off   - Continue on coreg 12.5 and hydralazine 100 TID  - c/w Amlodipine 5mg qd - (started on 5/28)  - Monitor BP with goal 150/90s    RESPIRATORY  # Respiratory failure   - AMS noted with baclofen toxicity requiring intubation   - Attempted extubation in MICU however course complicated by aspiration and prolonged course and now s/p tracheostomy with IP on 5/14  - Continue on vent 12/500/5/30 and able to tolerate SBT 5/5 @ FIO2 30%  - Will attempt TC today if able to tolerate it   - Continue on nebs and HTS Q12H for now     GI  # Dysphagia   - s/p surgical PEG 5/17   - Continue on tube feeds via PEG     # GIB  - Noted with several episodes of black stool with drop in Hgb (5/26 overnight) requiring PRBC with appropraite response  - GI following, no plan for scope at this time  - c/w HOLDING HEPARIN GTT  - H/H Stable for now  - Still noted with dark stool  - c/w PPI IV BID & now tolerating TF at goal    RENAL  # ESRD on HD MWF with R BCF  # Fistula stenosis ?  - VA AVF (5/2) with Right radiocephalic arteriovenous fistula has no hemodynamically significant stenosis present at the anastomotic site ( cm/sec, EDV 49 cm/sec). The usable segment is partially thrombosed with minimal patency.  - Required R FEMORAL line on medicine, then removed on 5/2, and replaced with R IJ SHILEY on 5/3 for CRRT then converted back to iHD MWF   - R SHILEY removed on 5/10 second to bacteremia and replaced on 5/13   - Continue on HD MWF per Renal   - Continue on Renvela 1600   - Continue on HCO3 650 tabs   - RIJ Shiley removed 5/27 for LINE HOLIDAY in s/o new fever pending NEG BCx   - Plan for Fistulogram this week with Vascular  - Plan for Permacath placement with IR on Friday 5/31  - K3.1 today, s/p replenished 40meq x 1 - Will monitor BMP BID for now    INFECTIOUS DISEASE  # Aspiration   - Recurrent fever spike and CXR with RLL opacity   - s/p Zosyn empirically (5/18 - 5/28) - ID following, recs appreciated   - RPT BCx NGTD, SCx 5/25 normal Jocelyn    # B Cereus Bacteremia   - Course complicated by fevers and aspiration event   - MRSA (5/1) negative   - BCx (5/2) negative   - SCx (5/4) and BAL (5/12) negative   - BCx (5/10) with bacillus cereus and cleared on (5/12).   - Case discussed with ID and s/p Vancomycin through 5/21 x 10 day course.   - Monitor off Abx for now    HEME  # AOCD with ESRD   - Anemia panel with AOCD and low # sat   - Transfused on 5/16   - EPO 10K continued on TIW   - Ferrous supplement added   - Monitor HH   - Start Heparin sc for DVT ppx on 5/29    VASCULAR   # RLE DVT   - CT AP (5/12) with nonocclusive RIGHT common iliac vein deep venous thrombosis  - Initially started on a Heparin GTT with course c/b GIB so now heparin on hold (5/27)    - IR consulted both for Shiley replacement post line holiday   - RLE Doppler - No evidence of deep vein thrombosis in the visualized right lower extremity  - No plan for IVC filter at this time     ENDOCRINE  # IDDM2 A1C 6.9  - c/w NPH 4U q 6 and ISS  - Monitor and adjust insulin based on FS     SKIN  - R FEMORAL (5/1-5/2)   - R IJ SHILEY (5/3-5/10)   - R IJ SHILEY (5/13 - 5/27)     ETHICS/ GOC    - FULL CODE     DISPO - Vent facility

## 2024-05-29 NOTE — PROGRESS NOTE ADULT - SUBJECTIVE AND OBJECTIVE BOX
Neurology Progress Note    S: Patient seen and examined.       Medications: MEDICATIONS  (STANDING):  albuterol/ipratropium for Nebulization 3 milliLiter(s) Nebulizer every 12 hours  amLODIPine   Tablet 5 milliGRAM(s) Oral daily  atorvastatin 20 milliGRAM(s) Oral at bedtime  carvedilol 12.5 milliGRAM(s) Oral every 12 hours  chlorhexidine 0.12% Liquid 15 milliLiter(s) Oral Mucosa every 12 hours  chlorhexidine 2% Cloths 1 Application(s) Topical <User Schedule>  dextrose 10% Bolus 125 milliLiter(s) IV Bolus once  dextrose 5%. 1000 milliLiter(s) (50 mL/Hr) IV Continuous <Continuous>  dextrose 5%. 1000 milliLiter(s) (100 mL/Hr) IV Continuous <Continuous>  dextrose 50% Injectable 25 Gram(s) IV Push once  dextrose 50% Injectable 12.5 Gram(s) IV Push once  epoetin reilly (EPOGEN) Injectable 72541 Unit(s) IV Push <User Schedule>  ferrous    sulfate Liquid 300 milliGRAM(s) Enteral Tube daily  glucagon  Injectable 1 milliGRAM(s) IntraMuscular once  heparin   Injectable 5000 Unit(s) SubCutaneous every 12 hours  hydrALAZINE 100 milliGRAM(s) Oral three times a day  insulin lispro (ADMELOG) corrective regimen sliding scale   SubCutaneous every 6 hours  insulin NPH human recombinant 4 Unit(s) SubCutaneous every 6 hours  pantoprazole  Injectable 40 milliGRAM(s) IV Push every 12 hours  sodium bicarbonate 650 milliGRAM(s) Oral every 8 hours  sodium chloride 3%  Inhalation 4 milliLiter(s) Inhalation every 12 hours    MEDICATIONS  (PRN):  acetaminophen   Oral Liquid .. 650 milliGRAM(s) Oral every 6 hours PRN Temp greater or equal to 38C (100.4F), Moderate Pain (4 - 6)  dextrose Oral Gel 15 Gram(s) Oral once PRN Blood Glucose LESS THAN 70 milliGRAM(s)/deciliter  melatonin 3 milliGRAM(s) Oral at bedtime PRN Insomnia  sodium chloride 0.9% lock flush 10 milliLiter(s) IV Push every 1 hour PRN Pre/post blood products, medications, blood draw, and to maintain line patency       Vitals:  Vital Signs Last 24 Hrs  T(C): 36.9 (29 May 2024 04:00), Max: 37.2 (28 May 2024 20:00)  T(F): 98.4 (29 May 2024 04:00), Max: 99 (28 May 2024 20:00)  HR: 70 (29 May 2024 11:12) (65 - 85)  BP: 122/80 (29 May 2024 04:00) (122/80 - 203/62)  BP(mean): 101 (28 May 2024 20:00) (93 - 101)  RR: 18 (29 May 2024 04:00) (17 - 18)  SpO2: 100% (29 May 2024 11:12) (98% - 100%)    Parameters below as of 29 May 2024 09:48  Patient On (Oxygen Delivery Method): ventilator                  General Exam:   General Appearance: + trach  Head: Normocephalic, atraumatic and no dysmorphic features  Ear, Nose, and Throat: Moist mucous membranes  CVS: S1S2+  Resp: mechanical  Abd: soft NTND  Extremities: No edema, no cyanosis  Skin: No bruises, no rashes     Neurological Exam:  Mental Status: Opens to voice, tracks, follows simple commands. Mouths words  Cranial Nerves: pupils 1-2mm, R facial palsy with smile  Motor: normal tone, RUE and RLE drift.  Sensation:  intact      I personally reviewed the below data/images/labs:    LABS:                          7.7    10.59 )-----------( 362      ( 29 May 2024 05:10 )             24.0     05-29    137  |  96<L>  |  33<H>  ----------------------------<  137<H>  3.1<L>   |  25  |  5.29<H>    Ca    7.9<L>      29 May 2024 05:10  Phos  3.6     05-29  Mg     2.20     05-29          Urinalysis Basic - ( 29 May 2024 05:10 )    Color: x / Appearance: x / SG: x / pH: x  Gluc: 137 mg/dL / Ketone: x  / Bili: x / Urobili: x   Blood: x / Protein: x / Nitrite: x   Leuk Esterase: x / RBC: x / WBC x   Sq Epi: x / Non Sq Epi: x / Bacteria: x                    CT Head No Cont:  (01 May 2024 18:11)  < from: CT Head No Cont (05.01.24 @ 18:11) >    ACC: [id number] EXAM:  CT BRAIN   ORDERED BY: SHELBY MOREL     PROCEDURE DATE:  05/01/2024          INTERPRETATION:  CT OF THE HEAD WITHOUT CONTRAST    CLINICAL INDICATION: Lethargy.    TECHNIQUE: Volumetric CT acquisition was performed through the brain and   reviewed using brain and bone window technique.      COMPARISON: CT head from 1/17/2024    FINDINGS:    The ventricular and sulcal size and configuration is age appropriate.     There is no acute loss of gray-white differentiation. Stable bilateral   inferior frontal encephalomalacia and gliosis likely related to remote   trauma.    There is no evidence of mass effect, midline shift, acute intracranial   hemorrhage, or extra-axial collections.     The calvarium is intact. The paranasalsinuses are clear.The mastoid air   cells are predominantly clear. The orbits are unremarkable.      IMPRESSION:  No acute intracranial hemorrhage or acute territorial infarction. Chronic   findings as above.    --- End of Report ---          GILDARDO KHAN; Resident Radiologist  This document has been electronically signed.  LADARIUS DENNY MD; Attending Radiologist  This document has been electronically signed. May  1 2024  7:54PM    < end of copied text >      EEG Classification / Summary:  Abnormal EEG in the awake, drowsy states.   -Generalized sharp waves with triphasic morphology, initially appearing as frequent GPDs at 1-1.5 Hz, decreasing in prevalence later in recording.  -Variable moderate to mild diffuse slowing.  -No electrographic seizures are captured.     Clinical Impression:  -Generalized sharp waves with triphasic morphology can be seen in toxic-metabolic encephalopathies and indicate some risk of generalized seizures, especially when at higher frequencies than in this study. These decrease in prevalence over the course of recording.  -Variable moderate to mild diffuse cerebral dysfunction is nonspecific in etiology.   -No seizures x2 days of recording.    m< from: MR Head w/wo IV Cont (05.06.24 @ 18:10) >    ACC: [id number] EXAM:  MR BRAIN WAW IC   ORDERED BY: DOLORES QUICK     PROCEDURE DATE:  05/06/2024          INTERPRETATION:  CLINICAL INDICATION: Altered mental status.    TECHNIQUE: Multi-planar, multi-sequence magnetic resonance imaging of the   brain was performed with and without intravenous contrast.    CONTRAST: Post-contrast MR images were obtained following infusion of 5   mL of Gadavist    COMPARISON: No prior studies are available for comparison    FINDINGS:    VENTRICLES AND SULCI:  Normal.  INTRA-AXIAL: Punctate foci of restricted diffusion in the left talya and   left cerebellum with associated T2 prolongation consistent with acute   infarcts. No acute intracranial hemorrhage. Encephalomalacia/gliosis   noted in the inferior frontal lobes. No abnormal enhancement. There are   patchy and confluent foci of hyperintense T2 signal within the   subcortical and periventricular white matter which are nonspecific but   likely related to chronic microvascular ischemic disease.  EXTRA-AXIAL:  No mass or collection.  VISUALIZED SINUSES: Mild polypoid mucosal thickening. Fluid noted in the   nasopharynx.  VISUALIZED MASTOIDS:  Clear.  CALVARIUM:  Normal.  CAROTID FLOW VOIDS: Normal.  MISCELLANEOUS:  None.    IMPRESSION: PUNCTATE FOCI OF RESTRICTED DIFFUSION IN THE LEFT TALYA AND   LEFT CEREBELLUM WITH ASSOCIATED T2 PROLONGATION CONSISTENT WITH ACUTE   INFARCTS. NO ACUTE INTRACRANIAL HEMORRHAGE. NO AREA OF ABNORMAL   ENHANCEMENT.    < end of copied text >    m< from: MR Angio Head No Cont (05.09.24 @ 15:58) >    ACC: [id number] EXAM:  MR ANGIO NECK WAW IC   ORDERED BY: OMAR BUTTON     ACC: [id number] EXAM:  MR ANGIO BRAIN   ORDERED BY: OMAR NESBITT     PROCEDURE DATE:  05/09/2024          INTERPRETATION:  .    CLINICAL INFORMATION: Stroke workup. Talya/cerebellar stroke.    TECHNIQUE: MRA images through the neck and [place] were obtained   using a combination of 2-D and 3-D time-of-flight acquisition. Post   contrast MR angiography of the neck was also performed. The data was then   reformatted intoa volumetric data set and reviewed as rotational MIP   images. 5 cc's of IV Gadavist was administered without immediate   complication. 2.5 cc's was discarded.    COMPARISON: Prior head CT exam dated 5/1/2024.    FINDINGS:    MRA Neck: There is a standard anatomic configuration to the aortic arch.    The origins of the great vessels appear unremarkable. The bilateral   common carotid arteries and carotid bifurcations appear unremarkable.    The bilateral cervical internal carotid arteries are within normal limits.    The origins of the bilateral vertebral arteries are normal. The bilateral   cervical vertebral arteries are normal in course and caliber.    MRA [place]: The bilateral intracranial internal carotid,   anterior, and middle cerebral arteries appear unremarkable.    The anterior and bilateral posterior communicating arteries are notable.    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 Otherwise, the   bilateral intradural vertebral arteries, vertebrobasilar junction,   basilar artery, and basilar tip appear unremarkable as well as the   bilateral posterior cerebral arteries.    IMPRESSION: No large vessel occlusion or stenosis.    < end of copied text >

## 2024-05-29 NOTE — PROGRESS NOTE ADULT - ASSESSMENT
pt high risk for endosopic eavlation  conservative gi magment  appreciate all services  bleed with a/c  risk benefit

## 2024-05-30 ENCOUNTER — RESULT REVIEW (OUTPATIENT)
Age: 74
End: 2024-05-30

## 2024-05-30 LAB
ANION GAP SERPL CALC-SCNC: 18 MMOL/L — HIGH (ref 7–14)
BASOPHILS # BLD AUTO: 0.08 K/UL — SIGNIFICANT CHANGE UP (ref 0–0.2)
BASOPHILS NFR BLD AUTO: 0.8 % — SIGNIFICANT CHANGE UP (ref 0–2)
BUN SERPL-MCNC: 50 MG/DL — HIGH (ref 7–23)
CALCIUM SERPL-MCNC: 7.9 MG/DL — LOW (ref 8.4–10.5)
CHLORIDE SERPL-SCNC: 95 MMOL/L — LOW (ref 98–107)
CO2 SERPL-SCNC: 22 MMOL/L — SIGNIFICANT CHANGE UP (ref 22–31)
CREAT SERPL-MCNC: 6.43 MG/DL — HIGH (ref 0.5–1.3)
CULTURE RESULTS: SIGNIFICANT CHANGE UP
CULTURE RESULTS: SIGNIFICANT CHANGE UP
EGFR: 9 ML/MIN/1.73M2 — LOW
EOSINOPHIL # BLD AUTO: 0.38 K/UL — SIGNIFICANT CHANGE UP (ref 0–0.5)
EOSINOPHIL NFR BLD AUTO: 3.7 % — SIGNIFICANT CHANGE UP (ref 0–6)
GLUCOSE BLDC GLUCOMTR-MCNC: 155 MG/DL — HIGH (ref 70–99)
GLUCOSE BLDC GLUCOMTR-MCNC: 169 MG/DL — HIGH (ref 70–99)
GLUCOSE BLDC GLUCOMTR-MCNC: 174 MG/DL — HIGH (ref 70–99)
GLUCOSE BLDC GLUCOMTR-MCNC: 190 MG/DL — HIGH (ref 70–99)
GLUCOSE SERPL-MCNC: 161 MG/DL — HIGH (ref 70–99)
GRAM STN FLD: ABNORMAL
HCT VFR BLD CALC: 23.7 % — LOW (ref 39–50)
HCT VFR BLD CALC: 28.2 % — LOW (ref 39–50)
HGB BLD-MCNC: 7.6 G/DL — LOW (ref 13–17)
HGB BLD-MCNC: 9.3 G/DL — LOW (ref 13–17)
IANC: 7.97 K/UL — HIGH (ref 1.8–7.4)
IMM GRANULOCYTES NFR BLD AUTO: 0.5 % — SIGNIFICANT CHANGE UP (ref 0–0.9)
LYMPHOCYTES # BLD AUTO: 1.17 K/UL — SIGNIFICANT CHANGE UP (ref 1–3.3)
LYMPHOCYTES # BLD AUTO: 11.3 % — LOW (ref 13–44)
MAGNESIUM SERPL-MCNC: 2.2 MG/DL — SIGNIFICANT CHANGE UP (ref 1.6–2.6)
MCHC RBC-ENTMCNC: 21.7 PG — LOW (ref 27–34)
MCHC RBC-ENTMCNC: 22.2 PG — LOW (ref 27–34)
MCHC RBC-ENTMCNC: 32.1 GM/DL — SIGNIFICANT CHANGE UP (ref 32–36)
MCHC RBC-ENTMCNC: 33 GM/DL — SIGNIFICANT CHANGE UP (ref 32–36)
MCV RBC AUTO: 67.5 FL — LOW (ref 80–100)
MCV RBC AUTO: 67.7 FL — LOW (ref 80–100)
MONOCYTES # BLD AUTO: 0.66 K/UL — SIGNIFICANT CHANGE UP (ref 0–0.9)
MONOCYTES NFR BLD AUTO: 6.4 % — SIGNIFICANT CHANGE UP (ref 2–14)
NEUTROPHILS # BLD AUTO: 7.97 K/UL — HIGH (ref 1.8–7.4)
NEUTROPHILS NFR BLD AUTO: 77.3 % — HIGH (ref 43–77)
NRBC # BLD: 0 /100 WBCS — SIGNIFICANT CHANGE UP (ref 0–0)
NRBC # BLD: 0 /100 WBCS — SIGNIFICANT CHANGE UP (ref 0–0)
NRBC # FLD: 0 K/UL — SIGNIFICANT CHANGE UP (ref 0–0)
NRBC # FLD: 0 K/UL — SIGNIFICANT CHANGE UP (ref 0–0)
PHOSPHATE SERPL-MCNC: 4.5 MG/DL — SIGNIFICANT CHANGE UP (ref 2.5–4.5)
PLATELET # BLD AUTO: 326 K/UL — SIGNIFICANT CHANGE UP (ref 150–400)
PLATELET # BLD AUTO: 404 K/UL — HIGH (ref 150–400)
POTASSIUM SERPL-MCNC: 3.8 MMOL/L — SIGNIFICANT CHANGE UP (ref 3.5–5.3)
POTASSIUM SERPL-SCNC: 3.8 MMOL/L — SIGNIFICANT CHANGE UP (ref 3.5–5.3)
RBC # BLD: 3.5 M/UL — LOW (ref 4.2–5.8)
RBC # BLD: 4.18 M/UL — LOW (ref 4.2–5.8)
RBC # FLD: 22.6 % — HIGH (ref 10.3–14.5)
RBC # FLD: 23.2 % — HIGH (ref 10.3–14.5)
SODIUM SERPL-SCNC: 135 MMOL/L — SIGNIFICANT CHANGE UP (ref 135–145)
SPECIMEN SOURCE: SIGNIFICANT CHANGE UP
WBC # BLD: 10.31 K/UL — SIGNIFICANT CHANGE UP (ref 3.8–10.5)
WBC # BLD: 8.79 K/UL — SIGNIFICANT CHANGE UP (ref 3.8–10.5)
WBC # FLD AUTO: 10.31 K/UL — SIGNIFICANT CHANGE UP (ref 3.8–10.5)
WBC # FLD AUTO: 8.79 K/UL — SIGNIFICANT CHANGE UP (ref 3.8–10.5)

## 2024-05-30 PROCEDURE — 99233 SBSQ HOSP IP/OBS HIGH 50: CPT

## 2024-05-30 PROCEDURE — 93970 EXTREMITY STUDY: CPT | Mod: 26

## 2024-05-30 RX ORDER — NIFEDIPINE 30 MG
30 TABLET, EXTENDED RELEASE 24 HR ORAL ONCE
Refills: 0 | Status: COMPLETED | OUTPATIENT
Start: 2024-05-30 | End: 2024-05-30

## 2024-05-30 RX ORDER — NIFEDIPINE 30 MG
30 TABLET, EXTENDED RELEASE 24 HR ORAL DAILY
Refills: 0 | Status: DISCONTINUED | OUTPATIENT
Start: 2024-05-31 | End: 2024-05-31

## 2024-05-30 RX ADMIN — Medication 300 MILLIGRAM(S): at 11:57

## 2024-05-30 RX ADMIN — HUMAN INSULIN 4 UNIT(S): 100 INJECTION, SUSPENSION SUBCUTANEOUS at 06:06

## 2024-05-30 RX ADMIN — CHLORHEXIDINE GLUCONATE 15 MILLILITER(S): 213 SOLUTION TOPICAL at 17:52

## 2024-05-30 RX ADMIN — Medication 650 MILLIGRAM(S): at 22:10

## 2024-05-30 RX ADMIN — ATORVASTATIN CALCIUM 20 MILLIGRAM(S): 80 TABLET, FILM COATED ORAL at 22:10

## 2024-05-30 RX ADMIN — HUMAN INSULIN 4 UNIT(S): 100 INJECTION, SUSPENSION SUBCUTANEOUS at 00:06

## 2024-05-30 RX ADMIN — CARVEDILOL PHOSPHATE 12.5 MILLIGRAM(S): 80 CAPSULE, EXTENDED RELEASE ORAL at 19:13

## 2024-05-30 RX ADMIN — CARVEDILOL PHOSPHATE 12.5 MILLIGRAM(S): 80 CAPSULE, EXTENDED RELEASE ORAL at 08:25

## 2024-05-30 RX ADMIN — Medication 650 MILLIGRAM(S): at 09:00

## 2024-05-30 RX ADMIN — Medication 2: at 17:51

## 2024-05-30 RX ADMIN — Medication 30 MILLIGRAM(S): at 18:45

## 2024-05-30 RX ADMIN — Medication 100 MILLIGRAM(S): at 05:00

## 2024-05-30 RX ADMIN — AMLODIPINE BESYLATE 5 MILLIGRAM(S): 2.5 TABLET ORAL at 05:00

## 2024-05-30 RX ADMIN — Medication 100 MILLIGRAM(S): at 19:13

## 2024-05-30 RX ADMIN — HEPARIN SODIUM 5000 UNIT(S): 5000 INJECTION INTRAVENOUS; SUBCUTANEOUS at 17:52

## 2024-05-30 RX ADMIN — HEPARIN SODIUM 5000 UNIT(S): 5000 INJECTION INTRAVENOUS; SUBCUTANEOUS at 06:07

## 2024-05-30 RX ADMIN — HUMAN INSULIN 4 UNIT(S): 100 INJECTION, SUSPENSION SUBCUTANEOUS at 11:57

## 2024-05-30 RX ADMIN — PANTOPRAZOLE SODIUM 40 MILLIGRAM(S): 20 TABLET, DELAYED RELEASE ORAL at 05:01

## 2024-05-30 RX ADMIN — Medication 3 MILLILITER(S): at 22:36

## 2024-05-30 RX ADMIN — Medication 650 MILLIGRAM(S): at 05:00

## 2024-05-30 RX ADMIN — Medication 650 MILLIGRAM(S): at 16:28

## 2024-05-30 RX ADMIN — Medication 2: at 11:56

## 2024-05-30 RX ADMIN — Medication 2: at 06:06

## 2024-05-30 RX ADMIN — PANTOPRAZOLE SODIUM 40 MILLIGRAM(S): 20 TABLET, DELAYED RELEASE ORAL at 17:52

## 2024-05-30 RX ADMIN — Medication 100 MILLIGRAM(S): at 11:57

## 2024-05-30 RX ADMIN — Medication 3 MILLILITER(S): at 08:18

## 2024-05-30 RX ADMIN — Medication 650 MILLIGRAM(S): at 08:25

## 2024-05-30 RX ADMIN — Medication 2: at 00:06

## 2024-05-30 RX ADMIN — CHLORHEXIDINE GLUCONATE 15 MILLILITER(S): 213 SOLUTION TOPICAL at 05:00

## 2024-05-30 RX ADMIN — SODIUM CHLORIDE 4 MILLILITER(S): 9 INJECTION INTRAMUSCULAR; INTRAVENOUS; SUBCUTANEOUS at 08:18

## 2024-05-30 RX ADMIN — HUMAN INSULIN 4 UNIT(S): 100 INJECTION, SUSPENSION SUBCUTANEOUS at 17:51

## 2024-05-30 RX ADMIN — SODIUM CHLORIDE 4 MILLILITER(S): 9 INJECTION INTRAMUSCULAR; INTRAVENOUS; SUBCUTANEOUS at 22:37

## 2024-05-30 NOTE — PROGRESS NOTE ADULT - SUBJECTIVE AND OBJECTIVE BOX
Neurology Progress Note    S: Patient seen and examined.       Medications: MEDICATIONS  (STANDING):  albuterol/ipratropium for Nebulization 3 milliLiter(s) Nebulizer every 12 hours  amLODIPine   Tablet 5 milliGRAM(s) Oral daily  atorvastatin 20 milliGRAM(s) Oral at bedtime  carvedilol 12.5 milliGRAM(s) Oral every 12 hours  chlorhexidine 0.12% Liquid 15 milliLiter(s) Oral Mucosa every 12 hours  chlorhexidine 2% Cloths 1 Application(s) Topical <User Schedule>  dextrose 10% Bolus 125 milliLiter(s) IV Bolus once  dextrose 5%. 1000 milliLiter(s) (50 mL/Hr) IV Continuous <Continuous>  dextrose 5%. 1000 milliLiter(s) (100 mL/Hr) IV Continuous <Continuous>  dextrose 50% Injectable 12.5 Gram(s) IV Push once  dextrose 50% Injectable 25 Gram(s) IV Push once  epoetin reilly (EPOGEN) Injectable 34904 Unit(s) IV Push <User Schedule>  ferrous    sulfate Liquid 300 milliGRAM(s) Enteral Tube daily  glucagon  Injectable 1 milliGRAM(s) IntraMuscular once  heparin   Injectable 5000 Unit(s) SubCutaneous every 12 hours  hydrALAZINE 100 milliGRAM(s) Oral three times a day  insulin lispro (ADMELOG) corrective regimen sliding scale   SubCutaneous every 6 hours  insulin NPH human recombinant 4 Unit(s) SubCutaneous every 6 hours  pantoprazole  Injectable 40 milliGRAM(s) IV Push every 12 hours  sodium bicarbonate 650 milliGRAM(s) Oral every 8 hours  sodium chloride 3%  Inhalation 4 milliLiter(s) Inhalation every 12 hours    MEDICATIONS  (PRN):  acetaminophen   Oral Liquid .. 650 milliGRAM(s) Oral every 6 hours PRN Temp greater or equal to 38C (100.4F), Moderate Pain (4 - 6)  dextrose Oral Gel 15 Gram(s) Oral once PRN Blood Glucose LESS THAN 70 milliGRAM(s)/deciliter  melatonin 3 milliGRAM(s) Oral at bedtime PRN Insomnia  sodium chloride 0.9% lock flush 10 milliLiter(s) IV Push every 1 hour PRN Pre/post blood products, medications, blood draw, and to maintain line patency       Vitals:  Vital Signs Last 24 Hrs  T(C): 37.3 (30 May 2024 12:00), Max: 37.9 (30 May 2024 08:00)  T(F): 99.1 (30 May 2024 12:00), Max: 100.3 (30 May 2024 08:00)  HR: 65 (30 May 2024 12:00) (63 - 78)  BP: 168/66 (30 May 2024 12:00) (153/52 - 177/62)  BP(mean): --  RR: 20 (30 May 2024 12:00) (12 - 21)  SpO2: 100% (30 May 2024 12:00) (95% - 100%)    Parameters below as of 30 May 2024 12:00  Patient On (Oxygen Delivery Method): tracheostomy collar  O2 Flow (L/min): 6  O2 Concentration (%): 28          General Exam:   General Appearance: + trach  Head: Normocephalic, atraumatic and no dysmorphic features  Ear, Nose, and Throat: Moist mucous membranes  CVS: S1S2+  Resp: mechanical  Abd: soft NTND  Extremities: No edema, no cyanosis  Skin: No bruises, no rashes     Neurological Exam:  Mental Status: Opens to voice, tracks, follows simple commands. Mouths words  Cranial Nerves: pupils 1-2mm, R facial palsy with smile  Motor: normal tone, RUE and RLE drift.  Sensation:  intact      I personally reviewed the below data/images/labs:    LABS:                          7.6    8.79  )-----------( 326      ( 30 May 2024 06:57 )             23.7     05-30    135  |  95<L>  |  50<H>  ----------------------------<  161<H>  3.8   |  22  |  6.43<H>    Ca    7.9<L>      30 May 2024 06:57  Phos  4.5     05-30  Mg     2.20     05-30          Urinalysis Basic - ( 30 May 2024 06:57 )    Color: x / Appearance: x / SG: x / pH: x  Gluc: 161 mg/dL / Ketone: x  / Bili: x / Urobili: x   Blood: x / Protein: x / Nitrite: x   Leuk Esterase: x / RBC: x / WBC x   Sq Epi: x / Non Sq Epi: x / Bacteria: x                    CT Head No Cont:  (01 May 2024 18:11)  < from: CT Head No Cont (05.01.24 @ 18:11) >    ACC: [id number] EXAM:  CT BRAIN   ORDERED BY: SHELBY MOREL     PROCEDURE DATE:  05/01/2024          INTERPRETATION:  CT OF THE HEAD WITHOUT CONTRAST    CLINICAL INDICATION: Lethargy.    TECHNIQUE: Volumetric CT acquisition was performed through the brain and   reviewed using brain and bone window technique.      COMPARISON: CT head from 1/17/2024    FINDINGS:    The ventricular and sulcal size and configuration is age appropriate.     There is no acute loss of gray-white differentiation. Stable bilateral   inferior frontal encephalomalacia and gliosis likely related to remote   trauma.    There is no evidence of mass effect, midline shift, acute intracranial   hemorrhage, or extra-axial collections.     The calvarium is intact. The paranasalsinuses are clear.The mastoid air   cells are predominantly clear. The orbits are unremarkable.      IMPRESSION:  No acute intracranial hemorrhage or acute territorial infarction. Chronic   findings as above.    --- End of Report ---          GILDARDO KHAN; Resident Radiologist  This document has been electronically signed.  LADARIUS DENNY MD; Attending Radiologist  This document has been electronically signed. May  1 2024  7:54PM    < end of copied text >      EEG Classification / Summary:  Abnormal EEG in the awake, drowsy states.   -Generalized sharp waves with triphasic morphology, initially appearing as frequent GPDs at 1-1.5 Hz, decreasing in prevalence later in recording.  -Variable moderate to mild diffuse slowing.  -No electrographic seizures are captured.     Clinical Impression:  -Generalized sharp waves with triphasic morphology can be seen in toxic-metabolic encephalopathies and indicate some risk of generalized seizures, especially when at higher frequencies than in this study. These decrease in prevalence over the course of recording.  -Variable moderate to mild diffuse cerebral dysfunction is nonspecific in etiology.   -No seizures x2 days of recording.    m< from: MR Head w/wo IV Cont (05.06.24 @ 18:10) >    ACC: [id number] EXAM:  MR BRAIN WAW IC   ORDERED BY: DOLORES QUICK     PROCEDURE DATE:  05/06/2024          INTERPRETATION:  CLINICAL INDICATION: Altered mental status.    TECHNIQUE: Multi-planar, multi-sequence magnetic resonance imaging of the   brain was performed with and without intravenous contrast.    CONTRAST: Post-contrast MR images were obtained following infusion of 5   mL of Gadavist    COMPARISON: No prior studies are available for comparison    FINDINGS:    VENTRICLES AND SULCI:  Normal.  INTRA-AXIAL: Punctate foci of restricted diffusion in the left talya and   left cerebellum with associated T2 prolongation consistent with acute   infarcts. No acute intracranial hemorrhage. Encephalomalacia/gliosis   noted in the inferior frontal lobes. No abnormal enhancement. There are   patchy and confluent foci of hyperintense T2 signal within the   subcortical and periventricular white matter which are nonspecific but   likely related to chronic microvascular ischemic disease.  EXTRA-AXIAL:  No mass or collection.  VISUALIZED SINUSES: Mild polypoid mucosal thickening. Fluid noted in the   nasopharynx.  VISUALIZED MASTOIDS:  Clear.  CALVARIUM:  Normal.  CAROTID FLOW VOIDS: Normal.  MISCELLANEOUS:  None.    IMPRESSION: PUNCTATE FOCI OF RESTRICTED DIFFUSION IN THE LEFT TALYA AND   LEFT CEREBELLUM WITH ASSOCIATED T2 PROLONGATION CONSISTENT WITH ACUTE   INFARCTS. NO ACUTE INTRACRANIAL HEMORRHAGE. NO AREA OF ABNORMAL   ENHANCEMENT.    < end of copied text >    m< from: MR Angio Head No Cont (05.09.24 @ 15:58) >    ACC: [id number] EXAM:  MR ANGIO NECK WAW IC   ORDERED BY: OMAR NESBITT     ACC: [id number] EXAM:  MR ANGIO BRAIN   ORDERED BY: OMAR NESBITT     PROCEDURE DATE:  05/09/2024          INTERPRETATION:  .    CLINICAL INFORMATION: Stroke workup. Talya/cerebellar stroke.    TECHNIQUE: MRA images through the neck and [place] were obtained   using a combination of 2-D and 3-D time-of-flight acquisition. Post   contrast MR angiography of the neck was also performed. The data was then   reformatted intoa volumetric data set and reviewed as rotational MIP   images. 5 cc's of IV Gadavist was administered without immediate   complication. 2.5 cc's was discarded.    COMPARISON: Prior head CT exam dated 5/1/2024.    FINDINGS:    MRA Neck: There is a standard anatomic configuration to the aortic arch.    The origins of the great vessels appear unremarkable. The bilateral   common carotid arteries and carotid bifurcations appear unremarkable.    The bilateral cervical internal carotid arteries are within normal limits.    The origins of the bilateral vertebral arteries are normal. The bilateral   cervical vertebral arteries are normal in course and caliber.    MRA [place]: The bilateral intracranial internal carotid,   anterior, and middle cerebral arteries appear unremarkable.    The anterior and bilateral posterior communicating arteries are notable.    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 Otherwise, the   bilateral intradural vertebral arteries, vertebrobasilar junction,   basilar artery, and basilar tip appear unremarkable as well as the   bilateral posterior cerebral arteries.    IMPRESSION: No large vessel occlusion or stenosis.    < end of copied text >

## 2024-05-30 NOTE — PROGRESS NOTE ADULT - ASSESSMENT
conservative gi magnemnt  no acute go complaints  avoid a/c risk of bleeding  high risk repeat endoscopic evalaiton

## 2024-05-30 NOTE — PROGRESS NOTE ADULT - SUBJECTIVE AND OBJECTIVE BOX
Patient is a 73y old  Male who presents with a chief complaint of Unstable angina, abn EKG (30 May 2024 09:49)      SUBJECTIVE / OVERNIGHT EVENTS: Heparin drip DCed 2/2 GI bleed ( 2/2 acute RLE DVT), now on HSC for DVT ppx, aaiting fistulogram, cont HD via R IJ HD catheter, awaiting permacath , scheduled for 5/31. vented via trache , management as per pulm.     MEDICATIONS  (STANDING):  albuterol/ipratropium for Nebulization 3 milliLiter(s) Nebulizer every 12 hours  amLODIPine   Tablet 5 milliGRAM(s) Oral daily  atorvastatin 20 milliGRAM(s) Oral at bedtime  carvedilol 12.5 milliGRAM(s) Oral every 12 hours  chlorhexidine 0.12% Liquid 15 milliLiter(s) Oral Mucosa every 12 hours  chlorhexidine 2% Cloths 1 Application(s) Topical <User Schedule>  dextrose 10% Bolus 125 milliLiter(s) IV Bolus once  dextrose 5%. 1000 milliLiter(s) (50 mL/Hr) IV Continuous <Continuous>  dextrose 5%. 1000 milliLiter(s) (100 mL/Hr) IV Continuous <Continuous>  dextrose 50% Injectable 12.5 Gram(s) IV Push once  dextrose 50% Injectable 25 Gram(s) IV Push once  epoetin reilly (EPOGEN) Injectable 98760 Unit(s) IV Push <User Schedule>  ferrous    sulfate Liquid 300 milliGRAM(s) Enteral Tube daily  glucagon  Injectable 1 milliGRAM(s) IntraMuscular once  heparin   Injectable 5000 Unit(s) SubCutaneous every 12 hours  hydrALAZINE 100 milliGRAM(s) Oral three times a day  insulin lispro (ADMELOG) corrective regimen sliding scale   SubCutaneous every 6 hours  insulin NPH human recombinant 4 Unit(s) SubCutaneous every 6 hours  pantoprazole  Injectable 40 milliGRAM(s) IV Push every 12 hours  sodium bicarbonate 650 milliGRAM(s) Oral every 8 hours  sodium chloride 3%  Inhalation 4 milliLiter(s) Inhalation every 12 hours    MEDICATIONS  (PRN):  acetaminophen   Oral Liquid .. 650 milliGRAM(s) Oral every 6 hours PRN Temp greater or equal to 38C (100.4F), Moderate Pain (4 - 6)  dextrose Oral Gel 15 Gram(s) Oral once PRN Blood Glucose LESS THAN 70 milliGRAM(s)/deciliter  melatonin 3 milliGRAM(s) Oral at bedtime PRN Insomnia  sodium chloride 0.9% lock flush 10 milliLiter(s) IV Push every 1 hour PRN Pre/post blood products, medications, blood draw, and to maintain line patency      Vital Signs Last 24 Hrs  T(F): 99.1 (05-30-24 @ 12:00), Max: 100.3 (05-30-24 @ 08:00)  HR: 65 (05-30-24 @ 12:00) (63 - 78)  BP: 168/66 (05-30-24 @ 12:00) (153/52 - 177/62)  RR: 20 (05-30-24 @ 12:00) (12 - 21)  SpO2: 100% (05-30-24 @ 12:00) (95% - 100%)  Telemetry:   CAPILLARY BLOOD GLUCOSE      POCT Blood Glucose.: 174 mg/dL (30 May 2024 11:21)  POCT Blood Glucose.: 169 mg/dL (30 May 2024 05:29)  POCT Blood Glucose.: 160 mg/dL (29 May 2024 23:22)  POCT Blood Glucose.: 196 mg/dL (29 May 2024 17:10)    I&O's Summary    29 May 2024 07:01  -  30 May 2024 07:00  --------------------------------------------------------  IN: 540 mL / OUT: 0 mL / NET: 540 mL        PHYSICAL EXAM:  GENERAL: NAD, well-developed  HEAD:  Atraumatic, Normocephalic  EYES: EOMI, PERRLA, conjunctiva and sclera clear  NECK: Supple, No JVD  CHEST/LUNG: Clear to auscultation bilaterally; No wheeze  HEART: Regular rate and rhythm; No murmurs, rubs, or gallops  ABDOMEN: Soft, Nontender, Nondistended; Bowel sounds present  EXTREMITIES:  2+ Peripheral Pulses, No clubbing, cyanosis, or edema  PSYCH: AAOx3  NEUROLOGY: non-focal  SKIN: No rashes or lesions    LABS:                        7.6    8.79  )-----------( 326      ( 30 May 2024 06:57 )             23.7     05-30    135  |  95<L>  |  50<H>  ----------------------------<  161<H>  3.8   |  22  |  6.43<H>    Ca    7.9<L>      30 May 2024 06:57  Phos  4.5     05-30  Mg     2.20     05-30            Urinalysis Basic - ( 30 May 2024 06:57 )    Color: x / Appearance: x / SG: x / pH: x  Gluc: 161 mg/dL / Ketone: x  / Bili: x / Urobili: x   Blood: x / Protein: x / Nitrite: x   Leuk Esterase: x / RBC: x / WBC x   Sq Epi: x / Non Sq Epi: x / Bacteria: x        RADIOLOGY & ADDITIONAL TESTS:    Imaging Personally Reviewed:    Consultant(s) Notes Reviewed:      Care Discussed with Consultants/Other Providers:

## 2024-05-30 NOTE — PROGRESS NOTE ADULT - ASSESSMENT
73-year-old male past medical history hypertension, ESRD on dialysis Monday Wednesday Friday, diabetes insulin-dependent presents with hiccups patient endorses persistent hiccups for the last 2 days.   found to have peaked T waves, a new finding. denied dizziness, N/V, denies CP, palps, abd pain  Upon admission seen by CArd, renal and GI  found to have a distended GB prob 2/2 gastroparesis, was started on Reglan  has received 4 doses of baclofen since 4/30 2/2 hiccups, last dose on 5/1 at 5 am  had received Haldol for agitation at 11 pm on 4/30, AMS observed in pm of 5/1  AMS ongoing, RRT x 2 called  now transferred to MICU for encephalopathy requiring  airway protection on 5/2,  intubated for airway protection, was on  propofol and pressors. now off  toxicology consulted upon dx of encephalopathy, not impressed AMS 2/2 Haldol nor baclofen . AMS was 2/2 acute CVA   HD was not done timely until femoral shiley was placed 2/2 clotted RUE AVF. now removed and RIJ shiley placed on 5/3  has been nonverbal since pm 5/1.     MR brain 5/6 c/w L pontine and L cerebellar acute infarcts, no hemorrhage . as per neuro: embolic in nature.   encephalopathy probably 2/2 acute CVA, now follows commands appropriately off sedation.   no SZ focus on EEG,   off CRRT,  HD resumed as per renal.   remains intubated, now trached  off keppra  AC resumed, was initially on  Heparin drip for R common iliac DVT, now held 2/2 GI bleed  completed 5 days of empiric ZOSYN on 5/7, shortly after became septic again after an extubation trial. bacteremia w B. cereus, on Vanc through 5/20 as per ID    s/p trache placement by pulm,   IR unable to find a window for G-J tube. PEG placed by surgery 5/17, resume feeds when cleared by surgery  HD via R IJ.  Tmax overnight( 5/18)  101, cxr c/w RLL PNA, now on Zosyn, blood Cx s sent. remains on Vanco for B. cereus bacteremia   leukocytosis resolved  EKG changes on 5/3 c/w NSTEMI loaded w DAPT , was  on Heparin drip x 48 hrs. rpt TTE is unchanged  ID, Neuro , renal, cardiology following.  clotted RUE AVF.  fistulogram was cancelled on 5/25 2/2 fever  HD via RIght IJ Shiley.   LP done, no sign of meningitis.   NEUROLOGY     - CTH without acute findings   - LP done, no acute findings  - MR brain w acute infarcts: L talya and L cerebellum, nonhemorrhagic  - no Sz focus on EEG    CARDIOVASCULAR   - ptn never had CP. just peaked T waves. was awaiting ischemic study w nucl stress test  - TTE showing EF 72%, rpt unchanged  - EKG changes on 5/3, loaded w DAPT    GI  - PEG placed, npo w tube feeds  - Gastroparesis       RENAL   -  HD as per renal  - via R IJ shiley  - permacath scheduled for 5/31,   fistulogram some time this week      INFECTIOUS DISEASE     completed a course of Zosyn, then became septic, bacteremic a B. cereus, completed 3 days of Meropenem, completed vanc through 5/21  on Zosyn since 5/18 for RLL PNA, afebrile, completed 5/23  recurrent fever 5/25, ZOsyn resumed    ENDOCRINE   - DM  - cw ISS    DVT  - RLE , initially on Heparin drip, now off 2/2 GI bleed. does he need an IVC filter?    GOC:  Full code

## 2024-05-30 NOTE — PROGRESS NOTE ADULT - SUBJECTIVE AND OBJECTIVE BOX
Cardiovascular Disease Progress Note  DATE OF SERVICE: 05-30-24 @ 09:49    Overnight events: No acute events overnight.    The patient is in no distress on mechanical ventilation via trach.   Otherwise review of systems negative    Objective Findings:  T(C): 37.2 (05-30-24 @ 04:00), Max: 37.2 (05-30-24 @ 04:00)  HR: 75 (05-30-24 @ 09:08) (63 - 77)  BP: 164/61 (05-30-24 @ 04:00) (157/62 - 177/62)  RR: 20 (05-30-24 @ 09:08) (12 - 21)  SpO2: 100% (05-30-24 @ 09:08) (95% - 100%)  Wt(kg): --  Daily     Daily       Physical Exam:  Gen: NAD; Patient resting comfortably  HEENT:  Normocephalic/ atraumatic  CV: RRR, normal S1 + S2, no m/r/g  Lungs:  Normal respiratory effort; clear to auscultation bilaterally  Abd: soft, non-tender; bowel sounds present  Ext:   warm and well perfused    Telemetry: n/a    Laboratory Data:                        7.6    8.79  )-----------( 326      ( 30 May 2024 06:57 )             23.7     05-30    135  |  95<L>  |  50<H>  ----------------------------<  161<H>  3.8   |  22  |  6.43<H>    Ca    7.9<L>      30 May 2024 06:57  Phos  4.5     05-30  Mg     2.20     05-30                Inpatient Medications:  MEDICATIONS  (STANDING):  albuterol/ipratropium for Nebulization 3 milliLiter(s) Nebulizer every 12 hours  amLODIPine   Tablet 5 milliGRAM(s) Oral daily  atorvastatin 20 milliGRAM(s) Oral at bedtime  carvedilol 12.5 milliGRAM(s) Oral every 12 hours  chlorhexidine 0.12% Liquid 15 milliLiter(s) Oral Mucosa every 12 hours  chlorhexidine 2% Cloths 1 Application(s) Topical <User Schedule>  dextrose 10% Bolus 125 milliLiter(s) IV Bolus once  dextrose 5%. 1000 milliLiter(s) (50 mL/Hr) IV Continuous <Continuous>  dextrose 5%. 1000 milliLiter(s) (100 mL/Hr) IV Continuous <Continuous>  dextrose 50% Injectable 25 Gram(s) IV Push once  dextrose 50% Injectable 12.5 Gram(s) IV Push once  epoetin reilly (EPOGEN) Injectable 33477 Unit(s) IV Push <User Schedule>  ferrous    sulfate Liquid 300 milliGRAM(s) Enteral Tube daily  glucagon  Injectable 1 milliGRAM(s) IntraMuscular once  heparin   Injectable 5000 Unit(s) SubCutaneous every 12 hours  hydrALAZINE 100 milliGRAM(s) Oral three times a day  insulin lispro (ADMELOG) corrective regimen sliding scale   SubCutaneous every 6 hours  insulin NPH human recombinant 4 Unit(s) SubCutaneous every 6 hours  pantoprazole  Injectable 40 milliGRAM(s) IV Push every 12 hours  sodium bicarbonate 650 milliGRAM(s) Oral every 8 hours  sodium chloride 3%  Inhalation 4 milliLiter(s) Inhalation every 12 hours      Assessment: 73 year old man with HTN, HLD, T2DM on insulin, and ESRD on HD presents with supply demand ischemia and angina.    Plan of Care:    #Type II myocardial infarction-  Secondary to distributive shock earlier on admission.   The repeat echo shows no new wall motion abnormality.   I would not pursue cath at this time given recurrent aspiration and chronic respiratory failure s/p trach 5/14.   The patient is optimized from a cardiac standpoint to proceed with fistulogram.       #Sinus bradycardia-  No evidence of AV block on admission EKG or telemetry.  No indication for pacing at this time.   - Continue Coreg with holding parameters.     #HTN-  BP acceptable.           #ESRD-  HD as per renal     #DVT   - R common Iliac DVT  Management as per RCU.             Over 55 minutes spent on total encounter; more than 50% of the visit was spent counseling and/or coordinating care by the attending physician.      Geovani Bravo MD [facility]  Cardiovascular Disease  [phone] Cardiovascular Disease Progress Note  DATE OF SERVICE: 05-30-24 @ 09:49    Overnight events: No acute events overnight.    The patient is in no distress on mechanical ventilation via trach.   Otherwise review of systems negative    Objective Findings:  T(C): 37.2 (05-30-24 @ 04:00), Max: 37.2 (05-30-24 @ 04:00)  HR: 75 (05-30-24 @ 09:08) (63 - 77)  BP: 164/61 (05-30-24 @ 04:00) (157/62 - 177/62)  RR: 20 (05-30-24 @ 09:08) (12 - 21)  SpO2: 100% (05-30-24 @ 09:08) (95% - 100%)  Wt(kg): --  Daily     Daily       Physical Exam:  Gen: NAD; Patient resting comfortably  HEENT:  Normocephalic/ atraumatic  CV: RRR, normal S1 + S2, no m/r/g  Lungs:  Normal respiratory effort; clear to auscultation bilaterally  Abd: soft, non-tender; bowel sounds present  Ext:   warm and well perfused    Telemetry: n/a    Laboratory Data:                        7.6    8.79  )-----------( 326      ( 30 May 2024 06:57 )             23.7     05-30    135  |  95<L>  |  50<H>  ----------------------------<  161<H>  3.8   |  22  |  6.43<H>    Ca    7.9<L>      30 May 2024 06:57  Phos  4.5     05-30  Mg     2.20     05-30                Inpatient Medications:  MEDICATIONS  (STANDING):  albuterol/ipratropium for Nebulization 3 milliLiter(s) Nebulizer every 12 hours  amLODIPine   Tablet 5 milliGRAM(s) Oral daily  atorvastatin 20 milliGRAM(s) Oral at bedtime  carvedilol 12.5 milliGRAM(s) Oral every 12 hours  chlorhexidine 0.12% Liquid 15 milliLiter(s) Oral Mucosa every 12 hours  chlorhexidine 2% Cloths 1 Application(s) Topical <User Schedule>  dextrose 10% Bolus 125 milliLiter(s) IV Bolus once  dextrose 5%. 1000 milliLiter(s) (50 mL/Hr) IV Continuous <Continuous>  dextrose 5%. 1000 milliLiter(s) (100 mL/Hr) IV Continuous <Continuous>  dextrose 50% Injectable 25 Gram(s) IV Push once  dextrose 50% Injectable 12.5 Gram(s) IV Push once  epoetin reilly (EPOGEN) Injectable 04282 Unit(s) IV Push <User Schedule>  ferrous    sulfate Liquid 300 milliGRAM(s) Enteral Tube daily  glucagon  Injectable 1 milliGRAM(s) IntraMuscular once  heparin   Injectable 5000 Unit(s) SubCutaneous every 12 hours  hydrALAZINE 100 milliGRAM(s) Oral three times a day  insulin lispro (ADMELOG) corrective regimen sliding scale   SubCutaneous every 6 hours  insulin NPH human recombinant 4 Unit(s) SubCutaneous every 6 hours  pantoprazole  Injectable 40 milliGRAM(s) IV Push every 12 hours  sodium bicarbonate 650 milliGRAM(s) Oral every 8 hours  sodium chloride 3%  Inhalation 4 milliLiter(s) Inhalation every 12 hours      Assessment: 73 year old man with HTN, HLD, T2DM on insulin, and ESRD on HD presents with supply demand ischemia and angina.    Plan of Care:    #Type II myocardial infarction-  Secondary to distributive shock earlier on admission.   The repeat echo shows no new wall motion abnormality.   I would not pursue cath at this time given recurrent aspiration and chronic respiratory failure s/p trach 5/14.   The patient is optimized from a cardiac standpoint to proceed with fistulogram.   - Continue Coreg throughout the jin-operative period.     #HTN-  BP acceptable.           #ESRD-  HD as per renal     #DVT   - R common Iliac DVT  Management as per RCU.             Over 55 minutes spent on total encounter; more than 50% of the visit was spent counseling and/or coordinating care by the attending physician.      Geovani Bravo MD [facility]  Cardiovascular Disease  [phone]

## 2024-05-30 NOTE — PROGRESS NOTE ADULT - SUBJECTIVE AND OBJECTIVE BOX
CHIEF COMPLAINT: Patient is a 73y old  Male who presents with a chief complaint of Unstable angina, abn EKG (30 May 2024 08:28)      Interval Events:    REVIEW OF SYSTEMS:  [ ] All other systems negative  [ ] Unable to assess ROS because ________    Mode: standby      OBJECTIVE:  ICU Vital Signs Last 24 Hrs  T(C): 37.2 (30 May 2024 04:00), Max: 37.2 (30 May 2024 04:00)  T(F): 99 (30 May 2024 04:00), Max: 99 (30 May 2024 04:00)  HR: 75 (30 May 2024 09:08) (63 - 77)  BP: 164/61 (30 May 2024 04:00) (157/62 - 177/62)  BP(mean): --  ABP: --  ABP(mean): --  RR: 20 (30 May 2024 09:08) (12 - 21)  SpO2: 100% (30 May 2024 09:08) (95% - 100%)    O2 Parameters below as of 30 May 2024 09:08  Patient On (Oxygen Delivery Method): tracheostomy collar  O2 Flow (L/min): 6  O2 Concentration (%): 28      Mode: standby    05-29 @ 07:01  -  05-30 @ 07:00  --------------------------------------------------------  IN: 540 mL / OUT: 0 mL / NET: 540 mL      CAPILLARY BLOOD GLUCOSE      POCT Blood Glucose.: 169 mg/dL (30 May 2024 05:29)      HOSPITAL MEDICATIONS:  MEDICATIONS  (STANDING):  albuterol/ipratropium for Nebulization 3 milliLiter(s) Nebulizer every 12 hours  amLODIPine   Tablet 5 milliGRAM(s) Oral daily  atorvastatin 20 milliGRAM(s) Oral at bedtime  carvedilol 12.5 milliGRAM(s) Oral every 12 hours  chlorhexidine 0.12% Liquid 15 milliLiter(s) Oral Mucosa every 12 hours  chlorhexidine 2% Cloths 1 Application(s) Topical <User Schedule>  dextrose 10% Bolus 125 milliLiter(s) IV Bolus once  dextrose 5%. 1000 milliLiter(s) (50 mL/Hr) IV Continuous <Continuous>  dextrose 5%. 1000 milliLiter(s) (100 mL/Hr) IV Continuous <Continuous>  dextrose 50% Injectable 12.5 Gram(s) IV Push once  dextrose 50% Injectable 25 Gram(s) IV Push once  epoetin reilly (EPOGEN) Injectable 14106 Unit(s) IV Push <User Schedule>  ferrous    sulfate Liquid 300 milliGRAM(s) Enteral Tube daily  glucagon  Injectable 1 milliGRAM(s) IntraMuscular once  heparin   Injectable 5000 Unit(s) SubCutaneous every 12 hours  hydrALAZINE 100 milliGRAM(s) Oral three times a day  insulin lispro (ADMELOG) corrective regimen sliding scale   SubCutaneous every 6 hours  insulin NPH human recombinant 4 Unit(s) SubCutaneous every 6 hours  pantoprazole  Injectable 40 milliGRAM(s) IV Push every 12 hours  sodium bicarbonate 650 milliGRAM(s) Oral every 8 hours  sodium chloride 3%  Inhalation 4 milliLiter(s) Inhalation every 12 hours    MEDICATIONS  (PRN):  acetaminophen   Oral Liquid .. 650 milliGRAM(s) Oral every 6 hours PRN Temp greater or equal to 38C (100.4F), Moderate Pain (4 - 6)  dextrose Oral Gel 15 Gram(s) Oral once PRN Blood Glucose LESS THAN 70 milliGRAM(s)/deciliter  melatonin 3 milliGRAM(s) Oral at bedtime PRN Insomnia  sodium chloride 0.9% lock flush 10 milliLiter(s) IV Push every 1 hour PRN Pre/post blood products, medications, blood draw, and to maintain line patency      LABS:                        7.6    8.79  )-----------( 326      ( 30 May 2024 06:57 )             23.7     05-30    135  |  95<L>  |  50<H>  ----------------------------<  161<H>  3.8   |  22  |  6.43<H>    Ca    7.9<L>      30 May 2024 06:57  Phos  4.5     05-30  Mg     2.20     05-30        Urinalysis Basic - ( 30 May 2024 06:57 )    Color: x / Appearance: x / SG: x / pH: x  Gluc: 161 mg/dL / Ketone: x  / Bili: x / Urobili: x   Blood: x / Protein: x / Nitrite: x   Leuk Esterase: x / RBC: x / WBC x   Sq Epi: x / Non Sq Epi: x / Bacteria: x            PAST MEDICAL & SURGICAL HISTORY:  Diabetes      Benign essential HTN      HLD (hyperlipidemia)      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

## 2024-05-30 NOTE — CHART NOTE - NSCHARTNOTEFT_GEN_A_CORE
IR Chart Note:    73y Male with pmh of ESRD requiring HD, currently via a right internal jugular vein non tunneled HD catheter ,recently removed given recent febrile episodes, undergoing line holiday . IR is following patient for re-insertion of tunnelled HD catheter insertion.  IR planning to place tunnelled HD catheter on Friday 5/31. Patient spike overnight fever. Given concern for infection risk, will place non-tunnelled HD catheter for Friday 5/31. Patient does not need to be NPO for procedure.     Daniel Garcia MD PGY3

## 2024-05-30 NOTE — PROGRESS NOTE ADULT - SUBJECTIVE AND OBJECTIVE BOX
[facility], Division of Infectious Diseases  AUGUST Carbajal Y. Patel, S. Shah, G. Brian  [phone]  ([phone] - weekdays after 5pm and weekends)    Name: JIMMY BLAND  Age/Gender: 73y Male  MRN: [id number]    Interval History:  Notes reviewed.   No concerning overnight events.  Afebrile.   Tmax 100.3 today    Allergies: No Known Allergies      Objective:  Vitals:   T(F): 99.1 (05-30-24 @ 12:00), Max: 100.3 (05-30-24 @ 08:00)  HR: 65 (05-30-24 @ 12:00) (63 - 78)  BP: 168/66 (05-30-24 @ 12:00) (153/52 - 177/62)  RR: 20 (05-30-24 @ 12:00) (12 - 21)  SpO2: 100% (05-30-24 @ 12:00) (95% - 100%)  Physical Examination:  General: no acute distress  HEENT: anicteric, tracheostomy  Lungs: clear to auscultation anteriorly  Heart: S1, S2, normal rate  Abdomen: Soft. ND. NT  Extremities: No LE edema.   Skin: Warm. Dry.     Laboratory Studies:  CBC:                       7.6    8.79  )-----------( 326      ( 30 May 2024 06:57 )             23.7     WBC Trend:  8.79 05-30-24 @ 06:57  10.18 05-29-24 @ 18:40  10.59 05-29-24 @ 05:10  7.69 05-28-24 @ 19:00  5.92 05-28-24 @ 05:25  7.04 05-27-24 @ 19:22  8.62 05-27-24 @ 05:45  8.65 05-26-24 @ 21:09  9.28 05-26-24 @ 19:49  12.15 05-26-24 @ 08:38  12.60 05-26-24 @ 01:20  12.54 05-25-24 @ 18:45  11.35 05-25-24 @ 06:35  11.93 05-24-24 @ 03:30    CMP: 05-30    135  |  95<L>  |  50<H>  ----------------------------<  161<H>  3.8   |  22  |  6.43<H>    Ca    7.9<L>      30 May 2024 06:57  Phos  4.5     05-30  Mg     2.20     05-30            Urinalysis Basic - ( 30 May 2024 06:57 )    Color: x / Appearance: x / SG: x / pH: x  Gluc: 161 mg/dL / Ketone: x  / Bili: x / Urobili: x   Blood: x / Protein: x / Nitrite: x   Leuk Esterase: x / RBC: x / WBC x   Sq Epi: x / Non Sq Epi: x / Bacteria: x      Microbiology: reviewed     Culture - Sputum (collected 05-25-24 @ 21:28)  Source: Trach Asp Tracheal Aspirate  Gram Stain (05-26-24 @ 13:57):    Few polymorphonuclear leukocytes per low power field    Rare Gram Negative Rods per oil power field  Final Report (05-27-24 @ 18:28):    Normal Respiratory Jocelyn present    Culture - Blood (collected 05-25-24 @ 19:00)  Source: .Blood Blood-Peripheral  Preliminary Report (05-29-24 @ 22:00):    No growth at 4 days    Culture - Blood (collected 05-25-24 @ 18:45)  Source: .Blood Blood-Peripheral  Preliminary Report (05-29-24 @ 22:00):    No growth at 4 days    Culture - Sputum (collected 05-18-24 @ 04:30)  Source: .Sputum Sputum  Gram Stain (05-18-24 @ 21:05):    Few polymorphonuclear leukocytes per low power field    No Squamous epithelial cells per low power field    No organisms seen per oil power field  Final Report (05-20-24 @ 07:02):    Normal Respiratory Jocelyn present    Culture - Blood (collected 05-18-24 @ 04:05)  Source: .Blood Blood-Venous  Final Report (05-23-24 @ 11:00):    No growth at 5 days    Culture - Blood (collected 05-18-24 @ 03:45)  Source: .Blood Blood-Venous  Final Report (05-23-24 @ 09:00):    No growth at 5 days        Radiology: reviewed     Medications:  acetaminophen   Oral Liquid .. 650 milliGRAM(s) Oral every 6 hours PRN  albuterol/ipratropium for Nebulization 3 milliLiter(s) Nebulizer every 12 hours  amLODIPine   Tablet 5 milliGRAM(s) Oral daily  atorvastatin 20 milliGRAM(s) Oral at bedtime  carvedilol 12.5 milliGRAM(s) Oral every 12 hours  chlorhexidine 0.12% Liquid 15 milliLiter(s) Oral Mucosa every 12 hours  chlorhexidine 2% Cloths 1 Application(s) Topical <User Schedule>  dextrose 10% Bolus 125 milliLiter(s) IV Bolus once  dextrose 5%. 1000 milliLiter(s) IV Continuous <Continuous>  dextrose 5%. 1000 milliLiter(s) IV Continuous <Continuous>  dextrose 50% Injectable 25 Gram(s) IV Push once  dextrose 50% Injectable 12.5 Gram(s) IV Push once  dextrose Oral Gel 15 Gram(s) Oral once PRN  epoetin reilly (EPOGEN) Injectable 21558 Unit(s) IV Push <User Schedule>  ferrous    sulfate Liquid 300 milliGRAM(s) Enteral Tube daily  glucagon  Injectable 1 milliGRAM(s) IntraMuscular once  heparin   Injectable 5000 Unit(s) SubCutaneous every 12 hours  hydrALAZINE 100 milliGRAM(s) Oral three times a day  insulin lispro (ADMELOG) corrective regimen sliding scale   SubCutaneous every 6 hours  insulin NPH human recombinant 4 Unit(s) SubCutaneous every 6 hours  melatonin 3 milliGRAM(s) Oral at bedtime PRN  pantoprazole  Injectable 40 milliGRAM(s) IV Push every 12 hours  sodium bicarbonate 650 milliGRAM(s) Oral every 8 hours  sodium chloride 0.9% lock flush 10 milliLiter(s) IV Push every 1 hour PRN  sodium chloride 3%  Inhalation 4 milliLiter(s) Inhalation every 12 hours    Antimicrobials:   [facility], Division of Infectious Diseases  AUGUST Carbajal Y. Patel, S. Shah, G. Brian  [phone]  ([phone] - weekdays after 5pm and weekends)    Name: JIMMY BLAND  Age/Gender: 73y Male  MRN: [id number]    Interval History:  Notes reviewed.   No concerning overnight events.  Afebrile.   Tmax 100.3 today  he denies SOB, abd pain, chest pain, SOB, dysuria    Allergies: No Known Allergies      Objective:  Vitals:   T(F): 99.1 (05-30-24 @ 12:00), Max: 100.3 (05-30-24 @ 08:00)  HR: 65 (05-30-24 @ 12:00) (63 - 78)  BP: 168/66 (05-30-24 @ 12:00) (153/52 - 177/62)  RR: 20 (05-30-24 @ 12:00) (12 - 21)  SpO2: 100% (05-30-24 @ 12:00) (95% - 100%)  Physical Examination:  General: no acute distress  HEENT: anicteric, tracheostomy  Lungs: clear to auscultation anteriorly  Heart: S1, S2, normal rate  Abdomen: Soft. ND. NT  Extremities: No LE edema.   Skin: Warm. Dry.     Laboratory Studies:  CBC:                       7.6    8.79  )-----------( 326      ( 30 May 2024 06:57 )             23.7     WBC Trend:  8.79 05-30-24 @ 06:57  10.18 05-29-24 @ 18:40  10.59 05-29-24 @ 05:10  7.69 05-28-24 @ 19:00  5.92 05-28-24 @ 05:25  7.04 05-27-24 @ 19:22  8.62 05-27-24 @ 05:45  8.65 05-26-24 @ 21:09  9.28 05-26-24 @ 19:49  12.15 05-26-24 @ 08:38  12.60 05-26-24 @ 01:20  12.54 05-25-24 @ 18:45  11.35 05-25-24 @ 06:35  11.93 05-24-24 @ 03:30    CMP: 05-30    135  |  95<L>  |  50<H>  ----------------------------<  161<H>  3.8   |  22  |  6.43<H>    Ca    7.9<L>      30 May 2024 06:57  Phos  4.5     05-30  Mg     2.20     05-30            Urinalysis Basic - ( 30 May 2024 06:57 )    Color: x / Appearance: x / SG: x / pH: x  Gluc: 161 mg/dL / Ketone: x  / Bili: x / Urobili: x   Blood: x / Protein: x / Nitrite: x   Leuk Esterase: x / RBC: x / WBC x   Sq Epi: x / Non Sq Epi: x / Bacteria: x      Microbiology: reviewed     Culture - Sputum (collected 05-25-24 @ 21:28)  Source: Trach Asp Tracheal Aspirate  Gram Stain (05-26-24 @ 13:57):    Few polymorphonuclear leukocytes per low power field    Rare Gram Negative Rods per oil power field  Final Report (05-27-24 @ 18:28):    Normal Respiratory Jocelyn present    Culture - Blood (collected 05-25-24 @ 19:00)  Source: .Blood Blood-Peripheral  Preliminary Report (05-29-24 @ 22:00):    No growth at 4 days    Culture - Blood (collected 05-25-24 @ 18:45)  Source: .Blood Blood-Peripheral  Preliminary Report (05-29-24 @ 22:00):    No growth at 4 days    Culture - Sputum (collected 05-18-24 @ 04:30)  Source: .Sputum Sputum  Gram Stain (05-18-24 @ 21:05):    Few polymorphonuclear leukocytes per low power field    No Squamous epithelial cells per low power field    No organisms seen per oil power field  Final Report (05-20-24 @ 07:02):    Normal Respiratory Jocelyn present    Culture - Blood (collected 05-18-24 @ 04:05)  Source: .Blood Blood-Venous  Final Report (05-23-24 @ 11:00):    No growth at 5 days    Culture - Blood (collected 05-18-24 @ 03:45)  Source: .Blood Blood-Venous  Final Report (05-23-24 @ 09:00):    No growth at 5 days        Radiology: reviewed     Medications:  acetaminophen   Oral Liquid .. 650 milliGRAM(s) Oral every 6 hours PRN  albuterol/ipratropium for Nebulization 3 milliLiter(s) Nebulizer every 12 hours  amLODIPine   Tablet 5 milliGRAM(s) Oral daily  atorvastatin 20 milliGRAM(s) Oral at bedtime  carvedilol 12.5 milliGRAM(s) Oral every 12 hours  chlorhexidine 0.12% Liquid 15 milliLiter(s) Oral Mucosa every 12 hours  chlorhexidine 2% Cloths 1 Application(s) Topical <User Schedule>  dextrose 10% Bolus 125 milliLiter(s) IV Bolus once  dextrose 5%. 1000 milliLiter(s) IV Continuous <Continuous>  dextrose 5%. 1000 milliLiter(s) IV Continuous <Continuous>  dextrose 50% Injectable 25 Gram(s) IV Push once  dextrose 50% Injectable 12.5 Gram(s) IV Push once  dextrose Oral Gel 15 Gram(s) Oral once PRN  epoetin reilly (EPOGEN) Injectable 32857 Unit(s) IV Push <User Schedule>  ferrous    sulfate Liquid 300 milliGRAM(s) Enteral Tube daily  glucagon  Injectable 1 milliGRAM(s) IntraMuscular once  heparin   Injectable 5000 Unit(s) SubCutaneous every 12 hours  hydrALAZINE 100 milliGRAM(s) Oral three times a day  insulin lispro (ADMELOG) corrective regimen sliding scale   SubCutaneous every 6 hours  insulin NPH human recombinant 4 Unit(s) SubCutaneous every 6 hours  melatonin 3 milliGRAM(s) Oral at bedtime PRN  pantoprazole  Injectable 40 milliGRAM(s) IV Push every 12 hours  sodium bicarbonate 650 milliGRAM(s) Oral every 8 hours  sodium chloride 0.9% lock flush 10 milliLiter(s) IV Push every 1 hour PRN  sodium chloride 3%  Inhalation 4 milliLiter(s) Inhalation every 12 hours    Antimicrobials:

## 2024-05-30 NOTE — PROGRESS NOTE ADULT - SUBJECTIVE AND OBJECTIVE BOX
Patient is a 73y Male     Patient is a 73y old  Male who presents with a chief complaint of Unstable angina, abn EKG (29 May 2024 22:49)      HPI:  73-year-old male past medical history hypertension, ESRD on dialysis Monday Wednesday Friday, diabetes insulin-dependent presents with hiccups patient endorses persistent hiccups for the last 2 days. found to have peaked T waves, a new finding. denies dizziness, N/V, denies CP, palps, abd pain (29 Apr 2024 21:15)      PAST MEDICAL & SURGICAL HISTORY:  Diabetes      Benign essential HTN      HLD (hyperlipidemia)      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      ESRD on hemodialysis      Arteriovenous fistula          MEDICATIONS  (STANDING):  albuterol/ipratropium for Nebulization 3 milliLiter(s) Nebulizer every 12 hours  amLODIPine   Tablet 5 milliGRAM(s) Oral daily  atorvastatin 20 milliGRAM(s) Oral at bedtime  carvedilol 12.5 milliGRAM(s) Oral every 12 hours  chlorhexidine 0.12% Liquid 15 milliLiter(s) Oral Mucosa every 12 hours  chlorhexidine 2% Cloths 1 Application(s) Topical <User Schedule>  dextrose 10% Bolus 125 milliLiter(s) IV Bolus once  dextrose 5%. 1000 milliLiter(s) (100 mL/Hr) IV Continuous <Continuous>  dextrose 5%. 1000 milliLiter(s) (50 mL/Hr) IV Continuous <Continuous>  dextrose 50% Injectable 12.5 Gram(s) IV Push once  dextrose 50% Injectable 25 Gram(s) IV Push once  epoetin reilly (EPOGEN) Injectable 88811 Unit(s) IV Push <User Schedule>  ferrous    sulfate Liquid 300 milliGRAM(s) Enteral Tube daily  glucagon  Injectable 1 milliGRAM(s) IntraMuscular once  heparin   Injectable 5000 Unit(s) SubCutaneous every 12 hours  hydrALAZINE 100 milliGRAM(s) Oral three times a day  insulin lispro (ADMELOG) corrective regimen sliding scale   SubCutaneous every 6 hours  insulin NPH human recombinant 4 Unit(s) SubCutaneous every 6 hours  pantoprazole  Injectable 40 milliGRAM(s) IV Push every 12 hours  sodium bicarbonate 650 milliGRAM(s) Oral every 8 hours  sodium chloride 3%  Inhalation 4 milliLiter(s) Inhalation every 12 hours      Allergies    No Known Allergies    Intolerances        SOCIAL HISTORY:  Denies ETOh,Smoking,     FAMILY HISTORY:  FHx: diabetes mellitus (Father, Aunt)        REVIEW OF SYSTEMS:    CONSTITUTIONAL: No weakness, fevers or chills  EYES/ENT: No visual changes;  No vertigo or throat pain   NECK: No pain or stiffness  RESPIRATORY: No cough, wheezing, hemoptysis; No shortness of breath  CARDIOVASCULAR: No chest pain or palpitations  GASTROINTESTINAL: No abdominal or epigastric pain. No nausea, vomiting, or hematemesis; No diarrhea or constipation. No melena or hematochezia.  GENITOURINARY: No dysuria, frequency or hematuria  NEUROLOGICAL: No numbness or weakness  SKIN: No itching, burning, rashes, or lesions   All other review of systems is negative unless indicated above.    VITAL:  T(C): , Max: 37.1 (05-29-24 @ 20:00)  T(F): , Max: 98.8 (05-29-24 @ 20:00)  HR: 68 (05-30-24 @ 03:34)  BP: 157/62 (05-30-24 @ 00:00)  BP(mean): --  RR: 12 (05-30-24 @ 00:00)  SpO2: 100% (05-30-24 @ 03:34)  Wt(kg): --    I and O's:    05-27 @ 07:01  -  05-28 @ 07:00  --------------------------------------------------------  IN: 520 mL / OUT: 2400 mL / NET: -1880 mL    05-28 @ 07:01  -  05-29 @ 07:00  --------------------------------------------------------  IN: 300 mL / OUT: 0 mL / NET: 300 mL          PHYSICAL EXAM:    Constitutional: NAD  HEENT: PERRLA,   Neck: No JVD  Respiratory: CTA B/L  Cardiovascular: S1 and S2  Gastrointestinal: BS+, soft, NT/ND  Extremities: No peripheral edema  Neurological: A/O x 3, no focal deficits  Psychiatric: Normal mood, normal affect  : No Abbasi  Skin: No rashes  Access: Not applicable  Back: No CVA tenderness    LABS:                        8.7    10.18 )-----------( 363      ( 29 May 2024 18:40 )             27.6     05-29    137  |  96<L>  |  42<H>  ----------------------------<  152<H>  4.0   |  24  |  5.82<H>    Ca    8.3<L>      29 May 2024 18:40  Phos  4.6     05-29  Mg     2.50     05-29            RADIOLOGY & ADDITIONAL STUDIES:

## 2024-05-30 NOTE — PROGRESS NOTE ADULT - ASSESSMENT
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 Baclofen started and course complicated by AMS second to baclofen toxicity requiring intubation and MICU transfer. Course further complicated by LEFT pontine and cerebellar stroke, R AVF stenosis, aspiration and B Cereus bacteremia and RLE DVT. s/p trach and transferred to RCU 5/16    NEUROLOGY  # AMS   - MRI HEAD (5/6) with punctate foci in the left talya and left cerebellum with concern for acute infarct.   - MRA HEAD and NECK (5/6) with no large vessel occlusion or stenosis.  - Continue on aspirin and hold DAPT for now pending procedures   - Continue on Lipitor for medical management   - Supportive care   - d/c 1:1 on 5/28, no SI    CARDIOVASCULAR  # CP with NSTEMI vs demand ischemia with HTN   - Admitted for CP and HTN with peaked T WAVES and ECG with ST deviation on admission   - Troponins elevated on admission with negative CKMB   - TTE (4/30) with EF 72, normal LVRVSF, and no regional wall motion abnormalities   - Case discussed with cardiology and no acute need for cath. DAPT loaded on 5/4 and plan for medical management with ASA, lipitor and heparin GTT.   - Hold Heparin GTT in s/o GIB as below (5/27)  - Still noted with dark stool, will continue to hold off on Full AC for now    # Septic vs vasoplegic shock  # Hx of HTN   - Home BP medications held and pressors weaned off   - Continue on coreg 12.5 and hydralazine 100 TID  - c/w Amlodipine 5mg qd - (started on 5/28)  - Monitor BP with goal 150/90s    RESPIRATORY  # Respiratory failure   - AMS noted with baclofen toxicity requiring intubation   - Attempted extubation in MICU however course complicated by aspiration and prolonged course and now s/p tracheostomy with IP on 5/14  - Continue on vent 12/500/5/30 and able to tolerate SBT 5/5 @ FIO2 30%  - Will attempt TC today if able to tolerate it   - Continue on nebs and HTS Q12H for now     GI  # Dysphagia   - s/p surgical PEG 5/17   - Continue on tube feeds via PEG     # GIB  - Noted with several episodes of black stool with drop in Hgb (5/26 overnight) requiring PRBC with appropraite response  - GI following, no plan for scope at this time  - c/w HOLDING HEPARIN GTT  - H/H Stable for now  - Still noted with dark stool  - c/w PPI IV BID & now tolerating TF at goal    RENAL  # ESRD on HD MWF with R BCF  # Fistula stenosis ?  - VA AVF (5/2) with Right radiocephalic arteriovenous fistula has no hemodynamically significant stenosis present at the anastomotic site ( cm/sec, EDV 49 cm/sec). The usable segment is partially thrombosed with minimal patency.  - Required R FEMORAL line on medicine, then removed on 5/2, and replaced with R IJ SHILEY on 5/3 for CRRT then converted back to iHD MWF   - R SHILEY removed on 5/10 second to bacteremia and replaced on 5/13   - Continue on HD MWF per Renal   - Continue on Renvela 1600   - Continue on HCO3 650 tabs   - RIJ Shiley removed 5/27 for LINE HOLIDAY in s/o new fever pending NEG BCx   - Plan for Fistulogram this week with Vascular  - Plan for Permacath placement with IR on Friday 5/31  - K3.1 today, s/p replenished 40meq x 1 - Will monitor BMP BID for now    INFECTIOUS DISEASE  # Aspiration   - Recurrent fever spike and CXR with RLL opacity   - s/p Zosyn empirically (5/18 - 5/28) - ID following, recs appreciated   - RPT BCx NGTD, SCx 5/25 normal Jocelyn    # B Cereus Bacteremia   - Course complicated by fevers and aspiration event   - MRSA (5/1) negative   - BCx (5/2) negative   - SCx (5/4) and BAL (5/12) negative   - BCx (5/10) with bacillus cereus and cleared on (5/12).   - Case discussed with ID and s/p Vancomycin through 5/21 x 10 day course.   - Monitor off Abx for now    HEME  # AOCD with ESRD   - Anemia panel with AOCD and low # sat   - Transfused on 5/16   - EPO 10K continued on TIW   - Ferrous supplement added   - Monitor HH   - Start Heparin sc for DVT ppx on 5/29    VASCULAR   # RLE DVT   - CT AP (5/12) with nonocclusive RIGHT common iliac vein deep venous thrombosis  - Initially started on a Heparin GTT with course c/b GIB so now heparin on hold (5/27)    - IR consulted both for Shiley replacement post line holiday   - RLE Doppler - No evidence of deep vein thrombosis in the visualized right lower extremity  - No plan for IVC filter at this time     ENDOCRINE  # IDDM2 A1C 6.9  - c/w NPH 4U q 6 and ISS  - Monitor and adjust insulin based on FS     SKIN  - R FEMORAL (5/1-5/2)   - R IJ SHILEY (5/3-5/10)   - R IJ SHILEY (5/13 - 5/27)     ETHICS/ GOC    - FULL CODE     DISPO - Vent facility

## 2024-05-30 NOTE — PROGRESS NOTE ADULT - ASSESSMENT
73-year-old male past medical history hypertension, ESRD on dialysis Monday Wednesday Friday, diabetes insulin-dependent presents with hiccups patient endorses persistent hiccups for the last 2 days. Nephrology consulted for HD needs.    A/P  ESRD:  Center: [place]  Nephrologist: Dr. Hardwick  Access: R AVF  MWF schedule  Vascular for fistulogram   s/p femoral shiley on 5/1, now removed  s/p placement of IJ shiley 5/3  Stopped CRRT   Restarted IHD  s/p HD 5/7 UF 1778; treatment terminated early due to hypotension   S/P MRA head/neck w/ gadolinium --> Received HD on 5/9 and 5/10.  5/10 Will need to dialyze 3 days consecutively--> plan for HD on 5/11 5/11 shiley removed due to bacteremia; did not receive HD.  Line holiday  5/12 - BUN worsened; K up trending.    5/13 Shiley placed.  5/17: s/p PEG placement.   5/21 IR consulted for shiley exchange; IR recommended to keep current shiley in place, with pending fistulogram   5/25 - Pt still did not go for fistulogram d/t scheduling issues.  Please exchange shiley that was placed on 5/13 or switch to PC. D/W team.  5/27 Shiley removed after HD.  5/30 - pt remains w/o access; remains on line holiday.  Electrolytes and volume status acceptable at current.  D/W medicine team regarding shiley placement by IR; planned for tomorrow, followed by HD.  Pending fistulogram/thrombectomy on Mon/Tues next week.   Continue HD MWF   Consent obtained and placed in ED chart  Renal diet  Monitor BMP  Consider [facility] for rehab where his outpatient Nephro-Dr. Hardwick can follow him    Hyperkalemia/Hypokalemia  Improved w/ HD.  C/W HD schedule as above.  Lokelma 10gm PRN for K >5.3 on non-HD days  PhosNaK given 5/21 and again 5/23.  K stable.  Low K diet.  Monitor closely     HTN:  BP fluctuating.   On carvedilol 12.5mg BID, hydralazine 100mg TID.  Currently off nifedipine --> consider restarting nifedipine @ 30mg if BP remains suboptimal.  UF w/ HD as BP permits.  Monitor BP.    Anemia:  Hgb low.  + iron deficiency.  Tsat 13% - on ferrous sulfate 300mg qd via PEG.  Venofer held d/t leukocytosis.  SILVA w/ HD.  Tranfuse for Hgb <7.  Monitor Hb.    CKD-MBD   - low for CKD.  PO4 at goal; prev. low.  Sevelamer 1600mg TID d/c'ed 5/21.  S/p PhosNaK x2 doses 5/23.  Monitor Ca, PO4 daily    Hypocalcemia:  In setting of CKD vs. hypoalbuminemia.  Optimize albumin.  Corrected Ca WNL.  Replete as needed.  Monitor Ca.

## 2024-05-30 NOTE — PROGRESS NOTE ADULT - NS ATTEND AMEND GEN_ALL_CORE FT
-    73M [facility] ESRD on HD, HTN, and DM2 presented to [facility] on 4/29/24 with chest pressure and hiccups, admitted 2/2 concern for demand ischemia with hospital course c/b baclofen toxicity and acute ischemic CVA left talya/cerebellum c/b acute hypoxemic and hypercapnic respiratory failure s/p ETT intubation/MV with failure to wean from ventilator s/p tracheostomy. Hospital course further complicated by aspiration and B cereus bacteremia and RLE DVT. Now transferred to RCU 5/16 for further management.     #Neuro: improved acute encephalopathy due to baclofen toxicity. MRI head 5/6 with left talya and left cerebellum ischemic infarcts with no vessel occlusion/stenosis on MRA. Hold aspirin given persistent melena. Continue statin. F/up neuro  #CV: HD stable, continue amlodipine, carvedilol, and hydralazine for HTN  #Pulm: doing well on trach collar, keep on trach collar ATC. Check ABG in AM. Airway clearance therapy with albuterol-ipratropium nebs + 3%NaCl nebs  #GI: slowly improving melena. H/H stable with HSQ for DVT ppx. Continue PPI BID. Continue PEG feeds  #Renal: ESRD, plan for non-tunneled dialysis catheter tomorrow with IR. Permacath canceled given fever 100.3 axillary today. If cultures negative and fevers resolve, will discuss with vascular regarding fistulogram/thrombectomy. Last HD 5/27. Labs today without urgent need for HD. Patient is medically optimized to proceed with planned IR procedure  #ID: fever 100.3 axillary today. Endotracheal cultures with GNR, f/up. Check blood cultures. Recently completed course of Zosyn 5/28 for aspiration pneumonia and B. cereus bacteremia. F/up ID. Monitor fevers  #Heme: non-occlusive R common iliac DVT on CT A/P, not seen on RLE duplex. Tolerating HSQ. Consider restarting heparin gtt tomorrow post-Shiley if H/H remains stable. If tolerates a/c, then can consider restarting aspirin  #Dispo: full code, discussed with patient and wife at bedside. PM&R eval for acute rehab when ready for discharge

## 2024-05-30 NOTE — PROGRESS NOTE ADULT - ASSESSMENT
73M w/ PMHx hypertension, ESRD on HD via R radiocephalic fistula (MWF), DM, HTN, p/w hiccups and peaked T waves, admitted to MICU for c/f baclofen toxicity. Patient received 1x HD on admission. R femoral shiley removed 5/2, had 2 RRT for AMS and failed HD via AVF 5/3. S/p emergent R IJ shiley placement 5/3, started CRRT which is now transitioned back to iHD. Vascular planning RUE fistulogram when medically optimized. Extubated to HFNC then reintubated 5/12 for c/f aspiration w/ hypoxic resp failure. S/p trach 5/14. Downgraded from MICU to RCU 5/16.    Recommendations:   - On OR schedule for left arm fistulogram and thrombectomy of AVF 09:30AM   - Will need HD prior to OR, calling IR to see if can be done 5/30 instead.  - BCx NGTD, tracheal aspirate w/ rare GNRs, normal resp dominick  - F/u repeat duplex of AVF and vein mapping for possible re-intervention planning  - Needs preop optimization documented by pulm/critcare team  - Hep gtt for nonocclusive R common iliac DVT on hold given melena/concern for UGIB - conservative management per GI at this time.  - To be consented for surgery and for rep INARI  - R IJ shiley removed 5/27 for line holiday  - Rest of care per primary    Vascular Surgery  [id number]   73M w/ PMHx hypertension, ESRD on HD via R radiocephalic fistula (MWF), DM, HTN, p/w hiccups and peaked T waves, admitted to MICU for c/f baclofen toxicity. Patient received 1x HD on admission. R femoral shiley removed 5/2, had 2 RRT for AMS and failed HD via AVF 5/3. S/p emergent R IJ shiley placement 5/3, started CRRT which is now transitioned back to iHD. Vascular planning RUE fistulogram when medically optimized. Extubated to HFNC then reintubated 5/12 for c/f aspiration w/ hypoxic resp failure. S/p trach 5/14. Downgraded from MICU to RCU 5/16.    Recommendations:   - Pt was previously OR schedule for left arm fistulogram and thrombectomy of AVF 09:30AM; however as of 5/30 AM pt had axillary fever to 100.3F. Primary team contacted ID who recommended holding off on fistulogram+thrombectomy AND tunneled permacath placement until new cultures negative x48 hours and pt steadily afebrile for 48 hours. No longer cleared for surgery.   - IR to place non-tunneled HD access 5/31 (spoke to IR directly)  - BCx NGTD, tracheal aspirate w/ rare GNRs, normal resp dominick  - F/u repeat  vein mapping for possible re-intervention planning  - Needs preop optimization documented by pulm/critcare team before eventual intervention - but not cleared by pulm/ID as of 5/30  - Hep gtt for nonocclusive R common iliac DVT on hold given melena/concern for UGIB - conservative management per GI at this time  - R IJ shiley removed 5/27 for line holiday  - Rest of care per primary    Vascular Surgery  [id number]

## 2024-05-30 NOTE — PROGRESS NOTE ADULT - SUBJECTIVE AND OBJECTIVE BOX
TEAM [ C ] Surgery Daily Progress Note  =====================================================    SUBJECTIVE: Patient seen and examined at bedside on AM rounds. Intermittently on trach collar.    PMH:   PAST MEDICAL & SURGICAL HISTORY:  Diabetes      Benign essential HTN      HLD (hyperlipidemia)      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      ESRD on hemodialysis      Arteriovenous fistula    ALLERGIES:  No Known Allergies  --------------------------------------------------------------------------------------    MEDICATIONS:    Neurologic Medications  acetaminophen   Oral Liquid .. 650 milliGRAM(s) Oral every 6 hours PRN Temp greater or equal to 38C (100.4F), Moderate Pain (4 - 6)  melatonin 3 milliGRAM(s) Oral at bedtime PRN Insomnia    Respiratory Medications  albuterol/ipratropium for Nebulization 3 milliLiter(s) Nebulizer every 12 hours  sodium chloride 3%  Inhalation 4 milliLiter(s) Inhalation every 12 hours    Cardiovascular Medications  amLODIPine   Tablet 5 milliGRAM(s) Oral daily  carvedilol 12.5 milliGRAM(s) Oral every 12 hours  hydrALAZINE 100 milliGRAM(s) Oral three times a day    Gastrointestinal Medications  dextrose 10% Bolus 125 milliLiter(s) IV Bolus once  dextrose 5%. 1000 milliLiter(s) IV Continuous <Continuous>  dextrose 5%. 1000 milliLiter(s) IV Continuous <Continuous>  ferrous    sulfate Liquid 300 milliGRAM(s) Enteral Tube daily  pantoprazole  Injectable 40 milliGRAM(s) IV Push every 12 hours  sodium bicarbonate 650 milliGRAM(s) Oral every 8 hours  sodium chloride 0.9% lock flush 10 milliLiter(s) IV Push every 1 hour PRN Pre/post blood products, medications, blood draw, and to maintain line patency    Genitourinary Medications    Hematologic/Oncologic Medications  epoetin reilly (EPOGEN) Injectable 90156 Unit(s) IV Push <User Schedule>  heparin   Injectable 5000 Unit(s) SubCutaneous every 12 hours    Antimicrobial/Immunologic Medications    Endocrine/Metabolic Medications  atorvastatin 20 milliGRAM(s) Oral at bedtime  dextrose 50% Injectable 25 Gram(s) IV Push once  dextrose 50% Injectable 12.5 Gram(s) IV Push once  dextrose Oral Gel 15 Gram(s) Oral once PRN Blood Glucose LESS THAN 70 milliGRAM(s)/deciliter  glucagon  Injectable 1 milliGRAM(s) IntraMuscular once  insulin lispro (ADMELOG) corrective regimen sliding scale   SubCutaneous every 6 hours  insulin NPH human recombinant 4 Unit(s) SubCutaneous every 6 hours    Topical/Other Medications  chlorhexidine 0.12% Liquid 15 milliLiter(s) Oral Mucosa every 12 hours  chlorhexidine 2% Cloths 1 Application(s) Topical <User Schedule>    --------------------------------------------------------------------------------------  VITAL SIGNS:    T(C): 37.2 (05-30-24 @ 04:00), Max: 37.2 (05-30-24 @ 04:00)  HR: 76 (05-30-24 @ 07:43) (63 - 77)  BP: 164/61 (05-30-24 @ 04:00) (157/62 - 177/62)  RR: 12 (05-30-24 @ 04:00) (12 - 21)  SpO2: 100% (05-30-24 @ 07:43) (95% - 100%)  --------------------------------------------------------------------------------------  Physical Exam:  General: NAD, resting comfortably in bed  Respiratory: Nonlabored respirations, s/p trach on vent  Cardio: regular rate  Vascular: R AVF w/ palpable thrill and pulse  --------------------------------------------------------------------------------------  LABS             7.6    8.79  )-----------( 326      ( 30 May 2024 06:57 )             23.7   05-30    135  |  95<L>  |  50<H>  ----------------------------<  161<H>  3.8   |  22  |  6.43<H>    Ca    7.9<L>      30 May 2024 06:57  Phos  4.5     05-30  Mg     2.20     05-30  --------------------------------------------------------------------------------------  INS AND OUTS:  05-29-24 @ 07:01  -  05-30-24 @ 07:00  --------------------------------------------------------  IN: 540 mL / OUT: 0 mL / NET: 540 mL  -------------------------------------------------------------------------------------- TEAM [ C ] Surgery Daily Progress Note  =====================================================    SUBJECTIVE: Patient seen and examined at bedside on AM rounds. Intermittently on trach collar.     PMH:   PAST MEDICAL & SURGICAL HISTORY:  Diabetes      Benign essential HTN      HLD (hyperlipidemia)      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      ESRD on hemodialysis      Arteriovenous fistula    ALLERGIES:  No Known Allergies  --------------------------------------------------------------------------------------    MEDICATIONS:    Neurologic Medications  acetaminophen   Oral Liquid .. 650 milliGRAM(s) Oral every 6 hours PRN Temp greater or equal to 38C (100.4F), Moderate Pain (4 - 6)  melatonin 3 milliGRAM(s) Oral at bedtime PRN Insomnia    Respiratory Medications  albuterol/ipratropium for Nebulization 3 milliLiter(s) Nebulizer every 12 hours  sodium chloride 3%  Inhalation 4 milliLiter(s) Inhalation every 12 hours    Cardiovascular Medications  amLODIPine   Tablet 5 milliGRAM(s) Oral daily  carvedilol 12.5 milliGRAM(s) Oral every 12 hours  hydrALAZINE 100 milliGRAM(s) Oral three times a day    Gastrointestinal Medications  dextrose 10% Bolus 125 milliLiter(s) IV Bolus once  dextrose 5%. 1000 milliLiter(s) IV Continuous <Continuous>  dextrose 5%. 1000 milliLiter(s) IV Continuous <Continuous>  ferrous    sulfate Liquid 300 milliGRAM(s) Enteral Tube daily  pantoprazole  Injectable 40 milliGRAM(s) IV Push every 12 hours  sodium bicarbonate 650 milliGRAM(s) Oral every 8 hours  sodium chloride 0.9% lock flush 10 milliLiter(s) IV Push every 1 hour PRN Pre/post blood products, medications, blood draw, and to maintain line patency    Genitourinary Medications    Hematologic/Oncologic Medications  epoetin reilly (EPOGEN) Injectable 55225 Unit(s) IV Push <User Schedule>  heparin   Injectable 5000 Unit(s) SubCutaneous every 12 hours    Antimicrobial/Immunologic Medications    Endocrine/Metabolic Medications  atorvastatin 20 milliGRAM(s) Oral at bedtime  dextrose 50% Injectable 25 Gram(s) IV Push once  dextrose 50% Injectable 12.5 Gram(s) IV Push once  dextrose Oral Gel 15 Gram(s) Oral once PRN Blood Glucose LESS THAN 70 milliGRAM(s)/deciliter  glucagon  Injectable 1 milliGRAM(s) IntraMuscular once  insulin lispro (ADMELOG) corrective regimen sliding scale   SubCutaneous every 6 hours  insulin NPH human recombinant 4 Unit(s) SubCutaneous every 6 hours    Topical/Other Medications  chlorhexidine 0.12% Liquid 15 milliLiter(s) Oral Mucosa every 12 hours  chlorhexidine 2% Cloths 1 Application(s) Topical <User Schedule>    --------------------------------------------------------------------------------------  VITAL SIGNS:    T(C): 37.2 (05-30-24 @ 04:00), Max: 37.2 (05-30-24 @ 04:00)  HR: 76 (05-30-24 @ 07:43) (63 - 77)  BP: 164/61 (05-30-24 @ 04:00) (157/62 - 177/62)  RR: 12 (05-30-24 @ 04:00) (12 - 21)  SpO2: 100% (05-30-24 @ 07:43) (95% - 100%)  --------------------------------------------------------------------------------------  Physical Exam:  General: NAD, resting comfortably in bed  Respiratory: Nonlabored respirations, s/p trach on vent  Cardio: regular rate  Vascular: R AVF w/ palpable thrill and pulse    --------------------------------------------------------------------------------------  LABS             7.6    8.79  )-----------( 326      ( 30 May 2024 06:57 )             23.7   05-30    135  |  95<L>  |  50<H>  ----------------------------<  161<H>  3.8   |  22  |  6.43<H>    Ca    7.9<L>      30 May 2024 06:57  Phos  4.5     05-30  Mg     2.20     05-30  --------------------------------------------------------------------------------------  INS AND OUTS:  05-29-24 @ 07:01  -  05-30-24 @ 07:00  --------------------------------------------------------  IN: 540 mL / OUT: 0 mL / NET: 540 mL  --------------------------------------------------------------------------------------

## 2024-05-30 NOTE — PROGRESS NOTE ADULT - ASSESSMENT
74 y/o M PMhx HTN, ESRD (on HD M/W/F), DM who presented with hiccups x 2 days and chest pain found to have peaked T waves. Hospital course c/b AMS s/p RRT on 5/1 and 5/2. Vascular consulted 2/2 RUE AVF access unable to be used s/p R femoral shiley placed for emergent HD. Transferred to MICU and intubated 5/2 for airway protection.   received empiric course of zosyn x 5 days, completed 5/7 and meropenem x 5 days, completed 5/14  B. cereus bacteremia, s/p vanc, completed 5/20    fever  possible RLL consolidation on CXRs  R non-tunneled HD catheter removed 5/27  blood cultures- NGTD  s/p zosyn x 7 days, completed 5/27  Tmax 100.3 today    anemia  started on protonix after melena  drop in H&H today    AMS, CVA  TTE- no evidence of valvular disease  s/p LP 5/2, viral encephalitis/ meningitis ruled out  MRI- Punctate foci of restricted diffusion in the left talya and left cerebellum with associated T2 prolongation consistent with acute infarcts  s/p trach 5/14, s/p PEG 5/17    DVT  CT- Nonocclusive R common iliac vein deep venous thrombosis.    Recommendations  monitor off antibiotics  f/u blood cultures  trend temps/ wbc  no objection to non-tunneled placement    Steven Torres M.D.  [facility], Division of Infectious Diseases  [phone]  After 5pm on weekdays and all day on weekends - please call [phone]  72 y/o M PMhx HTN, ESRD (on HD M/W/F), DM who presented with hiccups x 2 days and chest pain found to have peaked T waves. Hospital course c/b AMS s/p RRT on 5/1 and 5/2. Vascular consulted 2/2 RUE AVF access unable to be used s/p R femoral shiley placed for emergent HD. Transferred to MICU and intubated 5/2 for airway protection.   received empiric course of zosyn x 5 days, completed 5/7 and meropenem x 5 days, completed 5/14  B. cereus bacteremia, s/p vanc, completed 5/20    fever  possible RLL consolidation on CXRs  R non-tunneled HD catheter removed 5/27  blood cultures- NGTD  s/p zosyn x 7 days, completed 5/27  Tmax 100.3 today    anemia  started on protonix after melena  drop in H&H today    AMS, CVA  TTE- no evidence of valvular disease  s/p LP 5/2, viral encephalitis/ meningitis ruled out  MRI- Punctate foci of restricted diffusion in the left talya and left cerebellum with associated T2 prolongation consistent with acute infarcts  s/p trach 5/14, s/p PEG 5/17    DVT  CT- Nonocclusive R common iliac vein deep venous thrombosis.    Recommendations  monitor off antibiotics  f/u blood cultures  trend temps/ wbc  no objection to non-tunneled placement  would wait until blood cx NGTD x 48 hours prior to placing tunneled catheter    Steven Torres M.D.  [facility], Division of Infectious Diseases  [phone]  After 5pm on weekdays and all day on weekends - please call [phone]

## 2024-05-30 NOTE — PROGRESS NOTE ADULT - SUBJECTIVE AND OBJECTIVE BOX
[facility]   MD ELIANE BHAGAT MD ANGELA WONG, PA QIAN CHEN, NP    TEL:  OFFICE: [phone]  From 5pm-7am Answering Service [phone]    -- RENAL FOLLOW UP NOTE ---Date of Service 05-30-24 @ 16:42    Patient is a 73y old  Male who presents with a chief complaint of Unstable angina, abn EKG.    Patient seen and examined at bedside. No chest pain/SOB.    VITALS:  T(F): 99.1 (05-30-24 @ 12:00), Max: 100.3 (05-30-24 @ 08:00)  HR: 62 (05-30-24 @ 15:07)  BP: 168/66 (05-30-24 @ 12:00)  RR: 20 (05-30-24 @ 12:00)  SpO2: 97% (05-30-24 @ 15:07)  Wt(kg): --    05-29 @ 07:01  -  05-30 @ 07:00  --------------------------------------------------------  IN: 540 mL / OUT: 0 mL / NET: 540 mL      PHYSICAL EXAM:  General: NAD  Neck: No JVD  Respiratory: CTAB, no wheezes, rales or rhonchi  Cardiovascular: S1, S2, RRR  Gastrointestinal: BS+, soft, NT/ND  Extremities: No peripheral edema    Hospital Medications:   MEDICATIONS  (STANDING):  albuterol/ipratropium for Nebulization 3 milliLiter(s) Nebulizer every 12 hours  amLODIPine   Tablet 5 milliGRAM(s) Oral daily  atorvastatin 20 milliGRAM(s) Oral at bedtime  carvedilol 12.5 milliGRAM(s) Oral every 12 hours  chlorhexidine 0.12% Liquid 15 milliLiter(s) Oral Mucosa every 12 hours  chlorhexidine 2% Cloths 1 Application(s) Topical <User Schedule>  dextrose 10% Bolus 125 milliLiter(s) IV Bolus once  dextrose 5%. 1000 milliLiter(s) (50 mL/Hr) IV Continuous <Continuous>  dextrose 5%. 1000 milliLiter(s) (100 mL/Hr) IV Continuous <Continuous>  dextrose 50% Injectable 12.5 Gram(s) IV Push once  dextrose 50% Injectable 25 Gram(s) IV Push once  epoetin reilly (EPOGEN) Injectable 91956 Unit(s) IV Push <User Schedule>  ferrous    sulfate Liquid 300 milliGRAM(s) Enteral Tube daily  glucagon  Injectable 1 milliGRAM(s) IntraMuscular once  heparin   Injectable 5000 Unit(s) SubCutaneous every 12 hours  hydrALAZINE 100 milliGRAM(s) Oral three times a day  insulin lispro (ADMELOG) corrective regimen sliding scale   SubCutaneous every 6 hours  insulin NPH human recombinant 4 Unit(s) SubCutaneous every 6 hours  pantoprazole  Injectable 40 milliGRAM(s) IV Push every 12 hours  sodium bicarbonate 650 milliGRAM(s) Oral every 8 hours  sodium chloride 3%  Inhalation 4 milliLiter(s) Inhalation every 12 hours      LABS:  05-30    135  |  95<L>  |  50<H>  ----------------------------<  161<H>  3.8   |  22  |  6.43<H>    Ca    7.9<L>      30 May 2024 06:57  Phos  4.5     05-30  Mg     2.20     05-30      Creatinine Trend: 6.43 <--, 5.82 <--, 5.29 <--, 3.96 <--, 5.73 <--, 4.48 <--, 3.16 <--, 4.64 <--    Phosphorus: 4.5 mg/dL (05-30 @ 06:57)  Phosphorus: 4.6 mg/dL (05-29 @ 18:40)                          7.6    8.79  )-----------( 326      ( 30 May 2024 06:57 )             23.7     Urine Studies:  Urinalysis - [05-30-24 @ 06:57]      Color  / Appearance  / SG  / pH       Gluc 161 / Ketone   / Bili  / Urobili        Blood  / Protein  / Leuk Est  / Nitrite       RBC  / WBC  / Hyaline  / Gran  / Sq Epi  / Non Sq Epi  / Bacteria       Iron 16, TIBC 121, %sat 13      [05-17-24 @ 06:00]  Ferritin 720      [05-17-24 @ 06:00]  PTH -- (Ca --)      [05-26-24 @ 01:20]   122  TSH 2.60      [05-17-24 @ 06:00]  Lipid: chol 86, , HDL 27, LDL --      [05-17-24 @ 06:00]    HBsAb <3.0      [05-01-24 @ 14:42]  HBcAb Nonreact      [05-01-24 @ 14:42]

## 2024-05-31 ENCOUNTER — APPOINTMENT (OUTPATIENT)
Dept: VASCULAR SURGERY | Facility: HOSPITAL | Age: 74
End: 2024-05-31

## 2024-05-31 LAB
ANION GAP SERPL CALC-SCNC: 20 MMOL/L — HIGH (ref 7–14)
BASE EXCESS BLDV CALC-SCNC: 2.9 MMOL/L — SIGNIFICANT CHANGE UP (ref -2–3)
BLOOD GAS VENOUS COMPREHENSIVE RESULT: SIGNIFICANT CHANGE UP
BUN SERPL-MCNC: 62 MG/DL — HIGH (ref 7–23)
CALCIUM SERPL-MCNC: 8.6 MG/DL — SIGNIFICANT CHANGE UP (ref 8.4–10.5)
CHLORIDE BLDV-SCNC: 96 MMOL/L — SIGNIFICANT CHANGE UP (ref 96–108)
CHLORIDE SERPL-SCNC: 93 MMOL/L — LOW (ref 98–107)
CO2 BLDV-SCNC: 28.3 MMOL/L — HIGH (ref 22–26)
CO2 SERPL-SCNC: 23 MMOL/L — SIGNIFICANT CHANGE UP (ref 22–31)
CREAT SERPL-MCNC: 6.99 MG/DL — HIGH (ref 0.5–1.3)
DIALYSIS INSTRUMENT RESULT - HEPATITIS B SURFACE ANTIGEN: NEGATIVE — SIGNIFICANT CHANGE UP
EGFR: 8 ML/MIN/1.73M2 — LOW
GAS PNL BLDV: 129 MMOL/L — LOW (ref 136–145)
GAS PNL BLDV: SIGNIFICANT CHANGE UP
GLUCOSE BLDC GLUCOMTR-MCNC: 169 MG/DL — HIGH (ref 70–99)
GLUCOSE BLDC GLUCOMTR-MCNC: 198 MG/DL — HIGH (ref 70–99)
GLUCOSE BLDC GLUCOMTR-MCNC: 216 MG/DL — HIGH (ref 70–99)
GLUCOSE BLDC GLUCOMTR-MCNC: 224 MG/DL — HIGH (ref 70–99)
GLUCOSE BLDV-MCNC: 208 MG/DL — HIGH (ref 70–99)
GLUCOSE SERPL-MCNC: 190 MG/DL — HIGH (ref 70–99)
HCO3 BLDV-SCNC: 27 MMOL/L — SIGNIFICANT CHANGE UP (ref 22–29)
HCT VFR BLD CALC: 27.4 % — LOW (ref 39–50)
HCT VFR BLDA CALC: 30 % — LOW (ref 39–51)
HGB BLD CALC-MCNC: 10 G/DL — LOW (ref 12.6–17.4)
HGB BLD-MCNC: 9 G/DL — LOW (ref 13–17)
LACTATE BLDV-MCNC: 1.5 MMOL/L — SIGNIFICANT CHANGE UP (ref 0.5–2)
MAGNESIUM SERPL-MCNC: 2.4 MG/DL — SIGNIFICANT CHANGE UP (ref 1.6–2.6)
MCHC RBC-ENTMCNC: 22.2 PG — LOW (ref 27–34)
MCHC RBC-ENTMCNC: 32.8 GM/DL — SIGNIFICANT CHANGE UP (ref 32–36)
MCV RBC AUTO: 67.5 FL — LOW (ref 80–100)
NRBC # BLD: 0 /100 WBCS — SIGNIFICANT CHANGE UP (ref 0–0)
NRBC # FLD: 0 K/UL — SIGNIFICANT CHANGE UP (ref 0–0)
PCO2 BLDV: 39 MMHG — LOW (ref 42–55)
PH BLDV: 7.45 — HIGH (ref 7.32–7.43)
PHOSPHATE SERPL-MCNC: 5.1 MG/DL — HIGH (ref 2.5–4.5)
PLATELET # BLD AUTO: 399 K/UL — SIGNIFICANT CHANGE UP (ref 150–400)
PO2 BLDV: 56 MMHG — HIGH (ref 25–45)
POTASSIUM BLDV-SCNC: 4.3 MMOL/L — SIGNIFICANT CHANGE UP (ref 3.5–5.1)
POTASSIUM SERPL-MCNC: 4.1 MMOL/L — SIGNIFICANT CHANGE UP (ref 3.5–5.3)
POTASSIUM SERPL-SCNC: 4.1 MMOL/L — SIGNIFICANT CHANGE UP (ref 3.5–5.3)
RBC # BLD: 4.06 M/UL — LOW (ref 4.2–5.8)
RBC # FLD: 23.4 % — HIGH (ref 10.3–14.5)
SAO2 % BLDV: 87 % — SIGNIFICANT CHANGE UP (ref 67–88)
SODIUM SERPL-SCNC: 136 MMOL/L — SIGNIFICANT CHANGE UP (ref 135–145)
WBC # BLD: 10.08 K/UL — SIGNIFICANT CHANGE UP (ref 3.8–10.5)
WBC # FLD AUTO: 10.08 K/UL — SIGNIFICANT CHANGE UP (ref 3.8–10.5)

## 2024-05-31 PROCEDURE — 76937 US GUIDE VASCULAR ACCESS: CPT | Mod: 26

## 2024-05-31 PROCEDURE — 99222 1ST HOSP IP/OBS MODERATE 55: CPT

## 2024-05-31 PROCEDURE — 99233 SBSQ HOSP IP/OBS HIGH 50: CPT

## 2024-05-31 PROCEDURE — 77001 FLUOROGUIDE FOR VEIN DEVICE: CPT | Mod: 26,GC

## 2024-05-31 PROCEDURE — 36556 INSERT NON-TUNNEL CV CATH: CPT

## 2024-05-31 RX ORDER — CHLORHEXIDINE GLUCONATE 213 G/1000ML
1 SOLUTION TOPICAL
Refills: 0 | Status: DISCONTINUED | OUTPATIENT
Start: 2024-05-31 | End: 2024-06-01

## 2024-05-31 RX ORDER — HYDRALAZINE HCL 50 MG
5 TABLET ORAL EVERY 8 HOURS
Refills: 0 | Status: DISCONTINUED | OUTPATIENT
Start: 2024-05-31 | End: 2024-06-01

## 2024-05-31 RX ORDER — NIFEDIPINE 30 MG
10 TABLET, EXTENDED RELEASE 24 HR ORAL EVERY 8 HOURS
Refills: 0 | Status: DISCONTINUED | OUTPATIENT
Start: 2024-05-31 | End: 2024-05-31

## 2024-05-31 RX ORDER — AMLODIPINE BESYLATE 2.5 MG/1
10 TABLET ORAL DAILY
Refills: 0 | Status: DISCONTINUED | OUTPATIENT
Start: 2024-06-01 | End: 2024-06-14

## 2024-05-31 RX ADMIN — HUMAN INSULIN 4 UNIT(S): 100 INJECTION, SUSPENSION SUBCUTANEOUS at 05:58

## 2024-05-31 RX ADMIN — SODIUM CHLORIDE 4 MILLILITER(S): 9 INJECTION INTRAMUSCULAR; INTRAVENOUS; SUBCUTANEOUS at 21:25

## 2024-05-31 RX ADMIN — HEPARIN SODIUM 5000 UNIT(S): 5000 INJECTION INTRAVENOUS; SUBCUTANEOUS at 17:10

## 2024-05-31 RX ADMIN — Medication 3 MILLILITER(S): at 21:25

## 2024-05-31 RX ADMIN — Medication 5 MILLIGRAM(S): at 16:32

## 2024-05-31 RX ADMIN — Medication 2: at 05:57

## 2024-05-31 RX ADMIN — HUMAN INSULIN 4 UNIT(S): 100 INJECTION, SUSPENSION SUBCUTANEOUS at 11:48

## 2024-05-31 RX ADMIN — Medication 30 MILLIGRAM(S): at 05:00

## 2024-05-31 RX ADMIN — PANTOPRAZOLE SODIUM 40 MILLIGRAM(S): 20 TABLET, DELAYED RELEASE ORAL at 05:00

## 2024-05-31 RX ADMIN — HEPARIN SODIUM 5000 UNIT(S): 5000 INJECTION INTRAVENOUS; SUBCUTANEOUS at 05:00

## 2024-05-31 RX ADMIN — Medication 650 MILLIGRAM(S): at 05:01

## 2024-05-31 RX ADMIN — Medication 650 MILLIGRAM(S): at 13:18

## 2024-05-31 RX ADMIN — Medication 4: at 17:26

## 2024-05-31 RX ADMIN — Medication 100 MILLIGRAM(S): at 11:52

## 2024-05-31 RX ADMIN — Medication 4: at 11:49

## 2024-05-31 RX ADMIN — Medication 3 MILLILITER(S): at 10:02

## 2024-05-31 RX ADMIN — CARVEDILOL PHOSPHATE 12.5 MILLIGRAM(S): 80 CAPSULE, EXTENDED RELEASE ORAL at 08:12

## 2024-05-31 RX ADMIN — SODIUM CHLORIDE 4 MILLILITER(S): 9 INJECTION INTRAMUSCULAR; INTRAVENOUS; SUBCUTANEOUS at 10:03

## 2024-05-31 RX ADMIN — HUMAN INSULIN 4 UNIT(S): 100 INJECTION, SUSPENSION SUBCUTANEOUS at 17:27

## 2024-05-31 RX ADMIN — PANTOPRAZOLE SODIUM 40 MILLIGRAM(S): 20 TABLET, DELAYED RELEASE ORAL at 17:08

## 2024-05-31 RX ADMIN — Medication 100 MILLIGRAM(S): at 04:58

## 2024-05-31 RX ADMIN — Medication 100 MILLIGRAM(S): at 19:23

## 2024-05-31 RX ADMIN — ERYTHROPOIETIN 10000 UNIT(S): 10000 INJECTION, SOLUTION INTRAVENOUS; SUBCUTANEOUS at 21:11

## 2024-05-31 RX ADMIN — HUMAN INSULIN 4 UNIT(S): 100 INJECTION, SUSPENSION SUBCUTANEOUS at 00:07

## 2024-05-31 RX ADMIN — ATORVASTATIN CALCIUM 20 MILLIGRAM(S): 80 TABLET, FILM COATED ORAL at 21:26

## 2024-05-31 RX ADMIN — Medication 2: at 00:06

## 2024-05-31 RX ADMIN — Medication 300 MILLIGRAM(S): at 11:52

## 2024-05-31 RX ADMIN — CARVEDILOL PHOSPHATE 12.5 MILLIGRAM(S): 80 CAPSULE, EXTENDED RELEASE ORAL at 19:23

## 2024-05-31 RX ADMIN — AMLODIPINE BESYLATE 5 MILLIGRAM(S): 2.5 TABLET ORAL at 05:00

## 2024-05-31 RX ADMIN — CHLORHEXIDINE GLUCONATE 15 MILLILITER(S): 213 SOLUTION TOPICAL at 17:07

## 2024-05-31 RX ADMIN — Medication 650 MILLIGRAM(S): at 21:26

## 2024-05-31 NOTE — PROGRESS NOTE ADULT - SUBJECTIVE AND OBJECTIVE BOX
Cardiovascular Disease Progress Note  DATE OF SERVICE: 05-31-24 @ 09:58    Overnight events: No acute events overnight.    The patient is in no distress on trach collar.        Objective Findings:  T(C): 36.5 (05-31-24 @ 08:00), Max: 37.3 (05-30-24 @ 12:00)  HR: 74 (05-31-24 @ 08:00) (62 - 75)  BP: 137/57 (05-31-24 @ 08:00) (137/57 - 224/70)  RR: 20 (05-31-24 @ 08:00) (13 - 26)  SpO2: 95% (05-31-24 @ 08:00) (93% - 100%)  Wt(kg): --  Daily     Daily       Physical Exam:  Gen: NAD; Patient resting comfortably  HEENT: EOMI, Normocephalic/ atraumatic  CV: RRR, normal S1 + S2, no m/r/g  Lungs:  Normal respiratory effort; clear to auscultation bilaterally  Abd: soft, non-tender; bowel sounds present  Ext: No edema; warm and well perfused    Telemetry: n/a    Laboratory Data:                        9.0    10.08 )-----------( 399      ( 31 May 2024 05:10 )             27.4     05-31    136  |  93<L>  |  62<H>  ----------------------------<  190<H>  4.1   |  23  |  6.99<H>    Ca    8.6      31 May 2024 05:10  Phos  5.1     05-31  Mg     2.40     05-31                Inpatient Medications:  MEDICATIONS  (STANDING):  albuterol/ipratropium for Nebulization 3 milliLiter(s) Nebulizer every 12 hours  amLODIPine   Tablet 5 milliGRAM(s) Oral daily  atorvastatin 20 milliGRAM(s) Oral at bedtime  carvedilol 12.5 milliGRAM(s) Oral every 12 hours  chlorhexidine 0.12% Liquid 15 milliLiter(s) Oral Mucosa every 12 hours  chlorhexidine 2% Cloths 1 Application(s) Topical <User Schedule>  dextrose 10% Bolus 125 milliLiter(s) IV Bolus once  dextrose 5%. 1000 milliLiter(s) (50 mL/Hr) IV Continuous <Continuous>  dextrose 5%. 1000 milliLiter(s) (100 mL/Hr) IV Continuous <Continuous>  dextrose 50% Injectable 12.5 Gram(s) IV Push once  dextrose 50% Injectable 25 Gram(s) IV Push once  epoetin reilly (EPOGEN) Injectable 72169 Unit(s) IV Push <User Schedule>  ferrous    sulfate Liquid 300 milliGRAM(s) Enteral Tube daily  glucagon  Injectable 1 milliGRAM(s) IntraMuscular once  heparin   Injectable 5000 Unit(s) SubCutaneous every 12 hours  hydrALAZINE 100 milliGRAM(s) Oral three times a day  insulin lispro (ADMELOG) corrective regimen sliding scale   SubCutaneous every 6 hours  insulin NPH human recombinant 4 Unit(s) SubCutaneous every 6 hours  NIFEdipine IR 10 milliGRAM(s) Oral every 8 hours  pantoprazole  Injectable 40 milliGRAM(s) IV Push every 12 hours  sodium bicarbonate 650 milliGRAM(s) Oral every 8 hours  sodium chloride 3%  Inhalation 4 milliLiter(s) Inhalation every 12 hours      Assessment: 73 year old man with HTN, HLD, T2DM on insulin, and ESRD on HD presents with supply demand ischemia and angina.    Plan of Care:    #Type II myocardial infarction-  Secondary to distributive shock earlier on admission.   The repeat echo shows no new wall motion abnormality.   I would not pursue cath at this time given recurrent aspiration and chronic respiratory failure s/p trach 5/14.   The patient is optimized from a cardiac standpoint to proceed with vascular HD access intervention.   - Continue Coreg throughout the jin-operative period.     #HTN-  BP labile.  Please reconsider placing the patient on both amlodipine and nifedipine.           #ESRD-  HD as per renal     #DVT   - R common Iliac DVT  Management as per RCU.              #ACP (advance care planning)-  Advanced care planning was discussed with the patient.  Advance care planning forms were discussed. Risks, benefits and alternatives of medical treatment and procedures were discussed in detail and all questions were answered. 30 additional minutes spent addressing advance care plans.      Over 55 minutes spent on total encounter; more than 50% of the visit was spent counseling and/or coordinating care by the attending physician.      Geovani Bravo MD [facility]  Cardiovascular Disease  [phone]

## 2024-05-31 NOTE — PROGRESS NOTE ADULT - ASSESSMENT
73-year-old male past medical history hypertension, ESRD on dialysis Monday Wednesday Friday, diabetes insulin-dependent presents with hiccups patient endorses persistent hiccups for the last 2 days. Nephrology consulted for HD needs.    A/P  ESRD:  Center: [place]  Nephrologist: Dr. Hardwick  Access: R AVF  MWF schedule  Vascular for fistulogram   s/p femoral shiley on 5/1, now removed  s/p placement of IJ shiley 5/3  Stopped CRRT   Restarted IHD  s/p HD 5/7 UF 1778; treatment terminated early due to hypotension   S/P MRA head/neck w/ gadolinium --> Received HD on 5/9 and 5/10.  5/10 Will need to dialyze 3 days consecutively--> plan for HD on 5/11 5/11 shiley removed due to bacteremia; did not receive HD.  Line holiday  5/12 - BUN worsened; K up trending.    5/13 Shiley placed.  5/17: s/p PEG placement.   5/21 IR consulted for shiley exchange; IR recommended to keep current shiley in place, with pending fistulogram   5/25 - Pt still did not go for fistulogram d/t scheduling issues.  Please exchange shiley that was placed on 5/13 or switch to PC. D/W team.  5/27 Shiley removed after HD.  5/30 - pt remains w/o access; remains on line holiday.  Electrolytes and volume status acceptable at current.  D/W medicine team regarding shiley placement by IR; planned for tomorrow, followed by HD.  Pending fistulogram/thrombectomy on Mon/Tues next week.   5/31 - planned for shiley placement today; HD after.  Continue HD MWF   Consent obtained and placed in ED chart  Renal diet  Monitor BMP  [facility] [place] [facility] where his outpatient Nephro-Dr. Hardwick can follow him    Hyperkalemia/Hypokalemia  Improved w/ HD.  C/W HD schedule as above.  Lokelma 10gm PRN for K >5.3 on non-HD days  PhosNaK given 5/21 and again 5/23.  K stable.  Low K diet.  Monitor closely.    HTN:  BP fluctuating; high.  On carvedilol 12.5mg BID, hydralazine 100mg TID.  Currently off nifedipine --> consider restarting nifedipine @ 30mg qd if BP remains suboptimal post HD.  UF w/ HD as BP permits.  Monitor BP.    Anemia:  Hgb low.  + iron deficiency.  Tsat 13% - on ferrous sulfate 300mg qd via PEG.  Venofer held d/t leukocytosis.  SILVA w/ HD.  Transfuse for Hgb <7.  Monitor Hb.    CKD-MBD   - low for CKD.  PO4 at goal; prev. low.  Sevelamer 1600mg TID d/c'ed 5/21.  S/p PhosNaK x2 doses 5/23.  Monitor Ca, PO4 daily    Hypocalcemia:  In setting of CKD vs. hypoalbuminemia.  Optimize albumin.  Corrected Ca WNL.  Replete as needed.  Monitor Ca.

## 2024-05-31 NOTE — PROGRESS NOTE ADULT - ASSESSMENT
73M w/ PMHx hypertension, ESRD on HD via R radiocephalic fistula (MWF), DM, HTN, p/w hiccups and peaked T waves, admitted to MICU for c/f baclofen toxicity. Patient received 1x HD on admission. R femoral shiley removed 5/2, had 2 RRT for AMS and failed HD via AVF 5/3. S/p emergent R IJ shiley placement 5/3, started CRRT which is now transitioned back to iHD. Vascular planning RUE fistulogram when medically optimized. Extubated to HFNC then reintubated 5/12 for c/f aspiration w/ hypoxic resp failure. S/p trach 5/14. Downgraded from MICU to RCU 5/16.    Recommendations:   - OR on hold per ID/primary until BCX negative x48 hours (sent 5/30) and pt steadily afebrile for 48 hours. Not currently cleared for surgery. Sputum culture 5/30 GNRs (congruent with tracheal aspirates previously positive)  - IR to place non-tunneled HD access 5/31  - BCx NGTD, tracheal aspirate w/ rare GNRs, normal resp dominick  - Appreciate completed vein mapping - no adequate veins in UE for conduit (all <2 mm and/or thrombosed) for possible revision of RUE AVF  - Needs preop optimization documented by pulm/critcare team before eventual intervention - but not cleared by pulm/ID as of 5/31 as above  - Hep gtt for nonocclusive R common iliac DVT on hold given melena/concern for UGIB - conservative management per GI at this time - on SQH.   - Rest of care per primary    Vascular Surgery  [id number]

## 2024-05-31 NOTE — CONSULT NOTE ADULT - PROVIDER SPECIALTY LIST ADULT
Cardiology
Intervent Radiology
Intervent Radiology
Nephrology
Surgery
Intervent Radiology
Rehab Medicine
Infectious Disease
Neurology
Vascular Surgery
Intervent Pulmonology
Intervent Radiology

## 2024-05-31 NOTE — PROGRESS NOTE ADULT - ASSESSMENT
74 YO M with PMHx of ESRD on HD MWF, HTN, and 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 Baclofen started and course complicated by AMS second to baclofen toxicity requiring intubation and MICU transfer. Course further complicated by LEFT pontine and cerebellar stroke, R AVF stenosis, aspiration and B Cereus bacteremia and RLE DVT. s/p trach and transferred to RCU 5/16    NEUROLOGY  # AMS   - MRI HEAD (5/6) with punctate foci in the left talya and left cerebellum with concern for acute infarct.   - MRA HEAD and NECK (5/6) with no large vessel occlusion or stenosis.  - Continue on aspirin and hold DAPT for now pending procedures   - Continue on Lipitor for medical management   - Supportive care   - d/c 1:1 on 5/28, no SI    CARDIOVASCULAR  # CP with NSTEMI vs demand ischemia with HTN   - Admitted for CP and HTN with peaked T WAVES and ECG with ST deviation on admission   - Troponins elevated on admission with negative CKMB   - TTE (4/30) with EF 72, normal LVRVSF, and no regional wall motion abnormalities   - Case discussed with cardiology and no acute need for cath. DAPT loaded on 5/4 and plan for medical management with ASA, lipitor and heparin GTT.   - Hold Heparin GTT in s/o GIB as below (5/27)  - Still noted with dark stool, will continue to hold off on Full AC for now    # Septic vs vasoplegic shock  # Hx of HTN   - Home BP medications held and pressors weaned off   - Continue on coreg 12.5 and hydralazine 100 TID  - c/w Amlodipine 5mg qd - (started on 5/28)  - Monitor BP with goal 150/90s    RESPIRATORY  # Respiratory failure   - AMS noted with baclofen toxicity requiring intubation   - Attempted extubation in MICU however course complicated by aspiration and prolonged course and now s/p tracheostomy with IP on 5/14  - Continue on vent 12/500/5/30 and able to tolerate SBT 5/5 @ FIO2 30%  - Will attempt TC today if able to tolerate it   - Continue on nebs and HTS Q12H for now     GI  # Dysphagia   - s/p surgical PEG 5/17   - Continue on tube feeds via PEG     # GIB  - Noted with several episodes of black stool with drop in Hgb (5/26 overnight) requiring PRBC with appropraite response  - GI following, no plan for scope at this time  - c/w HOLDING HEPARIN GTT  - H/H Stable for now  - Still noted with dark stool  - c/w PPI IV BID & now tolerating TF at goal    RENAL  # ESRD on HD MWF with R BCF  # Fistula stenosis ?  - VA AVF (5/2) with Right radiocephalic arteriovenous fistula has no hemodynamically significant stenosis present at the anastomotic site ( cm/sec, EDV 49 cm/sec). The usable segment is partially thrombosed with minimal patency.  - Required R FEMORAL line on medicine, then removed on 5/2, and replaced with R IJ SHILEY on 5/3 for CRRT then converted back to iHD MWF   - R SHILEY removed on 5/10 second to bacteremia and replaced on 5/13   - Continue on HD MWF per Renal   - Continue on Renvela 1600   - Continue on HCO3 650 tabs   - RIJ Shiley removed 5/27 for LINE HOLIDAY in s/o new fever pending NEG BCx   - Plan for Fistulogram this week with Vascular  - Plan for Permacath placement with IR on Friday 5/31  - K3.1 today, s/p replenished 40meq x 1 - Will monitor BMP BID for now    INFECTIOUS DISEASE  # Aspiration   - Recurrent fever spike and CXR with RLL opacity   - s/p Zosyn empirically (5/18 - 5/28) - ID following, recs appreciated   - RPT BCx NGTD, SCx 5/25 normal Jocelyn    # B Cereus Bacteremia   - Course complicated by fevers and aspiration event   - MRSA (5/1) negative   - BCx (5/2) negative   - SCx (5/4) and BAL (5/12) negative   - BCx (5/10) with bacillus cereus and cleared on (5/12).   - Case discussed with ID and s/p Vancomycin through 5/21 x 10 day course.   - Monitor off Abx for now    HEME  # AOCD with ESRD   - Anemia panel with AOCD and low # sat   - Transfused on 5/16   - EPO 10K continued on TIW   - Ferrous supplement added   - Monitor HH   - Start Heparin sc for DVT ppx on 5/29    VASCULAR   # RLE DVT   - CT AP (5/12) with nonocclusive RIGHT common iliac vein deep venous thrombosis  - Initially started on a Heparin GTT with course c/b GIB so now heparin on hold (5/27)    - IR consulted both for Shiley replacement post line holiday   - RLE Doppler - No evidence of deep vein thrombosis in the visualized right lower extremity  - No plan for IVC filter at this time     ENDOCRINE  # IDDM2 A1C 6.9  - c/w NPH 4U q 6 and ISS  - Monitor and adjust insulin based on FS     SKIN  - R FEMORAL (5/1-5/2)   - R IJ SHILEY (5/3-5/10)   - R IJ SHILEY (5/13 - 5/27)     ETHICS/ GOC    - FULL CODE     DISPO - Vent facility    74 YO M with PMHx of ESRD on HD MWF, HTN, and 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 Baclofen started and course complicated by AMS second to baclofen toxicity requiring intubation and MICU transfer. Course further complicated by LEFT pontine and cerebellar stroke, R AVF stenosis, aspiration and B Cereus bacteremia and RLE DVT. s/p trach and transferred to RCU 5/16 and course complicated by intermittent fever spikes with likely aspiration citrobacter PNA, AOCD, and GIB vs Fe stools.     NEUROLOGY  # AMS   - MRI HEAD (5/6) with punctate foci in the left talya and left cerebellum with concern for acute infarct.   - MRA HEAD and NECK (5/6) with no large vessel occlusion or stenosis.  - Continue on aspirin and hold DAPT for now pending procedures   - Continue on Lipitor for medical management     PSYCH   # Depression   - Concern for depression with questionable SI   - CO started, however formal discussion and evaluation with no true SI   - Supportive care and encouragement QD    CARDIOVASCULAR  # CP with NSTEMI < demand ischemia with HTN   - Admitted for CP and HTN with peaked T WAVES and ECG with ST deviation on admission   - Troponins elevated on admission with negative CKMB   - TTE (4/30) with EF 72, normal LVRVSF, and no regional wall motion abnormalities   - Case discussed with cardiology and no acute need for cath.   - DAPT loaded and continue on medical management with ASA and lipitor     # Septic vs vasoplegic shock  # Hx of HTN   - Home BP medications held and pressors weaned off   - Continue on coreg 12.5, hydralazine 100 TID and norvasc 10mg (increased 5/31)   - Monitor blood pressures with hydral 5mg IVP Q8H PRN   - Adjust BP regimen as needed     RESPIRATORY  # Respiratory failure   - AMS noted with baclofen toxicity requiring intubation   - Attempted extubation in MICU however course complicated by aspiration and prolonged course and now s/p tracheostomy with IP on 5/14  - Continue on vent 12/500/5/30 and weaned to TC ATC   - Continue on nebs and HTS Q12H for now   - Continue on TC ATC (5/30 - )     GI  # Dysphagia   - s/p surgical PEG 5/17   - Continue on tube feeds via PEG   - Attempt SS as able to tolerate TC ATC    # GIB  - Noted with several episodes of black stool with drop in Hgb (5/26 overnight) requiring PRBC with appropriate response  - Case discussed with GI, questionable anemia from AOCD with ESRD and black stool likely from iron, and no plan for scope at this time  - Holding heparin GTT as below and continue on PPI  - Monitor stools     RENAL  # ESRD on HD MWF with R BCF  # Fistula stenosis   - VA AVF (5/2) with Right radiocephalic arteriovenous fistula has no hemodynamically significant stenosis present at the anastomotic site ( cm/sec, EDV 49 cm/sec). The usable segment is partially thrombosed with minimal patency.  - Required R FEMORAL line on medicine, then removed on 5/2, and replaced with R IJ SHILEY on 5/3 for CRRT then converted back to iHD MWF   - R IJ SHILEY removed on 5/10 second to bacteremia and replaced on 5/13  - R IJ SHILEY removed 5/27 given intermittent fevers    - Blood cultures negative and plan to replace SHILEY TODAY   - Vein mapping with no adequate veins in UE for conduit (all <2 mm and/or thrombosed) and pending possible revision of RUE AVF.   Plan for fistulogram and fistula repair next week when BCx and SCx final   - Continue on HD MWF per Renal   - Continue on HCO3 650 tabs     # Hyperphosphatemia   - Continue on Renvela 1600 however phos corrects well with HD   - Monitor off Renvela     INFECTIOUS DISEASE  # Aspiration concern   - Recurrent fever spike and CXR with RLL opacity   - BCx (5/25 and 5/30) negative   - SCx (5/25) negative   - SCx (5/30) with citrobacter   - s/p Zosyn empirically (5/18 - 5/28) with intermittent fever spikes. Per discussion with ID will hold further ABX pending SCx sensitivities. Zosyn should cover for zosyn and currently with improvement in respiratory status.     # B Cereus Bacteremia   - Course complicated by fevers and aspiration event   - MRSA (5/1) negative   - BCx (5/2) negative   - SCx (5/4) and BAL (5/12) negative   - BCx (5/10) with bacillus cereus and cleared on (5/12).   - Case discussed with ID and s/p Vancomycin through 5/21 x 10 day course.   - Monitor off Abx for now    HEME  # AOCD with ESRD   - Anemia panel with AOCD and low % sat   - EPO 10K continued on TIW   - Ferrous supplement added   - - Transfused on 5/16 and 5/26 and will monitor HH     VASCULAR   # RLE DVT   - CT AP (5/12) with nonocclusive RIGHT common iliac vein deep venous thrombosis and case discussed with IR with no plans for IVC filter.   - Initially started on a Heparin GTT with course c/b questionable GIB given dark tarry stools, however more likely second to iron tabs.   - Challenge on HSQ with stable HH.   - Resume Heparin  GTT post SHILEY .     ENDOCRINE  # IDDM2 A1C 6.9  - c/w NPH 4U Q6 and ISS  - Monitor and adjust insulin based on FS     SKIN  - R FEMORAL (5/1-5/2)   - R IJ SHILEY (5/3-5/10)   - R IJ SHILEY (5/13 - 5/27)     ETHICS/ GOC    - FULL CODE     DISPO - PMR recc KIMI, however if strength improves while in house then plan for RPT PMR evaluation for acute rehab.

## 2024-05-31 NOTE — PROGRESS NOTE ADULT - SUBJECTIVE AND OBJECTIVE BOX
[facility], Division of Infectious Diseases  AUGUST Carbajal Y. Patel, S. Shah, G. Brian  [phone]  ([phone] - weekdays after 5pm and weekends)    Name: JIMMY BLAND  Age/Gender: 73y Male  MRN: [id number]    Interval History:  Notes reviewed.   No concerning overnight events.  Afebrile.   Tmax 100.3 yesterday    Allergies: No Known Allergies      Objective:  Vitals:   T(F): 97.7 (05-31-24 @ 08:00), Max: 99.1 (05-30-24 @ 12:00)  HR: 74 (05-31-24 @ 08:00) (62 - 75)  BP: 137/57 (05-31-24 @ 08:00) (137/57 - 224/70)  RR: 20 (05-31-24 @ 08:00) (13 - 26)  SpO2: 95% (05-31-24 @ 08:00) (93% - 100%)  Physical Examination:  General: no acute distress  HEENT: tracheostomy  Heart: normal rate  Lungs: clear to auscultation anteriorly  Abdomen: Soft. ND. NT  Extremities: No LE edema.   Skin: Warm. Dry.     Laboratory Studies:  CBC:                       9.0    10.08 )-----------( 399      ( 31 May 2024 05:10 )             27.4     WBC Trend:  10.08 05-31-24 @ 05:10  10.31 05-30-24 @ 18:10  8.79 05-30-24 @ 06:57  10.18 05-29-24 @ 18:40  10.59 05-29-24 @ 05:10  7.69 05-28-24 @ 19:00  5.92 05-28-24 @ 05:25  7.04 05-27-24 @ 19:22  8.62 05-27-24 @ 05:45  8.65 05-26-24 @ 21:09  9.28 05-26-24 @ 19:49  12.15 05-26-24 @ 08:38  12.60 05-26-24 @ 01:20  12.54 05-25-24 @ 18:45  11.35 05-25-24 @ 06:35    CMP: 05-31    136  |  93<L>  |  62<H>  ----------------------------<  190<H>  4.1   |  23  |  6.99<H>    Ca    8.6      31 May 2024 05:10  Phos  5.1     05-31  Mg     2.40     05-31            Urinalysis Basic - ( 31 May 2024 05:10 )    Color: x / Appearance: x / SG: x / pH: x  Gluc: 190 mg/dL / Ketone: x  / Bili: x / Urobili: x   Blood: x / Protein: x / Nitrite: x   Leuk Esterase: x / RBC: x / WBC x   Sq Epi: x / Non Sq Epi: x / Bacteria: x      Microbiology: reviewed     Culture - Sputum (collected 05-30-24 @ 11:44)  Source: .Sputum Sputum  Gram Stain (05-30-24 @ 19:45):    Moderate polymorphonuclear leukocytes per low power field    Few Squamous epithelial cells per low power field    Few Gram Negative Rods per oil power field    Culture - Sputum (collected 05-25-24 @ 21:28)  Source: Trach Asp Tracheal Aspirate  Gram Stain (05-26-24 @ 13:57):    Few polymorphonuclear leukocytes per low power field    Rare Gram Negative Rods per oil power field  Final Report (05-27-24 @ 18:28):    Normal Respiratory Jocelyn present    Culture - Blood (collected 05-25-24 @ 19:00)  Source: .Blood Blood-Peripheral  Final Report (05-30-24 @ 22:01):    No growth at 5 days    Culture - Blood (collected 05-25-24 @ 18:45)  Source: .Blood Blood-Peripheral  Final Report (05-30-24 @ 22:01):    No growth at 5 days    Culture - Sputum (collected 05-18-24 @ 04:30)  Source: .Sputum Sputum  Gram Stain (05-18-24 @ 21:05):    Few polymorphonuclear leukocytes per low power field    No Squamous epithelial cells per low power field    No organisms seen per oil power field  Final Report (05-20-24 @ 07:02):    Normal Respiratory Jocelyn present    Culture - Blood (collected 05-18-24 @ 04:05)  Source: .Blood Blood-Venous  Final Report (05-23-24 @ 11:00):    No growth at 5 days    Culture - Blood (collected 05-18-24 @ 03:45)  Source: .Blood Blood-Venous  Final Report (05-23-24 @ 09:00):    No growth at 5 days        Radiology: reviewed     Medications:  acetaminophen   Oral Liquid .. 650 milliGRAM(s) Oral every 6 hours PRN  albuterol/ipratropium for Nebulization 3 milliLiter(s) Nebulizer every 12 hours  amLODIPine   Tablet 5 milliGRAM(s) Oral daily  atorvastatin 20 milliGRAM(s) Oral at bedtime  carvedilol 12.5 milliGRAM(s) Oral every 12 hours  chlorhexidine 0.12% Liquid 15 milliLiter(s) Oral Mucosa every 12 hours  chlorhexidine 2% Cloths 1 Application(s) Topical <User Schedule>  dextrose 10% Bolus 125 milliLiter(s) IV Bolus once  dextrose 5%. 1000 milliLiter(s) IV Continuous <Continuous>  dextrose 5%. 1000 milliLiter(s) IV Continuous <Continuous>  dextrose 50% Injectable 12.5 Gram(s) IV Push once  dextrose 50% Injectable 25 Gram(s) IV Push once  dextrose Oral Gel 15 Gram(s) Oral once PRN  epoetin reilly (EPOGEN) Injectable 88880 Unit(s) IV Push <User Schedule>  ferrous    sulfate Liquid 300 milliGRAM(s) Enteral Tube daily  glucagon  Injectable 1 milliGRAM(s) IntraMuscular once  heparin   Injectable 5000 Unit(s) SubCutaneous every 12 hours  hydrALAZINE 100 milliGRAM(s) Oral three times a day  insulin lispro (ADMELOG) corrective regimen sliding scale   SubCutaneous every 6 hours  insulin NPH human recombinant 4 Unit(s) SubCutaneous every 6 hours  melatonin 3 milliGRAM(s) Oral at bedtime PRN  NIFEdipine IR 10 milliGRAM(s) Oral every 8 hours  pantoprazole  Injectable 40 milliGRAM(s) IV Push every 12 hours  sodium bicarbonate 650 milliGRAM(s) Oral every 8 hours  sodium chloride 0.9% lock flush 10 milliLiter(s) IV Push every 1 hour PRN  sodium chloride 3%  Inhalation 4 milliLiter(s) Inhalation every 12 hours    Antimicrobials:

## 2024-05-31 NOTE — PROGRESS NOTE ADULT - SUBJECTIVE AND OBJECTIVE BOX
[facility] [ C ] Surgery Daily Progress Note  =====================================================    SUBJECTIVE: Patient seen and examined at bedside on AM rounds. Patient shakes head no to asking if he feels subjective fever or chills. Pt currently on trach collar.     PMH:   PAST MEDICAL & SURGICAL HISTORY:  Diabetes      Benign essential HTN      HLD (hyperlipidemia)      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      ESRD on hemodialysis      Arteriovenous fistula          ALLERGIES:  No Known Allergies      --------------------------------------------------------------------------------------    MEDICATIONS:    Neurologic Medications  acetaminophen   Oral Liquid .. 650 milliGRAM(s) Oral every 6 hours PRN Temp greater or equal to 38C (100.4F), Moderate Pain (4 - 6)  melatonin 3 milliGRAM(s) Oral at bedtime PRN Insomnia    Respiratory Medications  albuterol/ipratropium for Nebulization 3 milliLiter(s) Nebulizer every 12 hours  sodium chloride 3%  Inhalation 4 milliLiter(s) Inhalation every 12 hours    Cardiovascular Medications  amLODIPine   Tablet 5 milliGRAM(s) Oral daily  carvedilol 12.5 milliGRAM(s) Oral every 12 hours  hydrALAZINE 100 milliGRAM(s) Oral three times a day  NIFEdipine XL 30 milliGRAM(s) Oral daily    Gastrointestinal Medications  dextrose 10% Bolus 125 milliLiter(s) IV Bolus once  dextrose 5%. 1000 milliLiter(s) IV Continuous <Continuous>  dextrose 5%. 1000 milliLiter(s) IV Continuous <Continuous>  ferrous    sulfate Liquid 300 milliGRAM(s) Enteral Tube daily  pantoprazole  Injectable 40 milliGRAM(s) IV Push every 12 hours  sodium bicarbonate 650 milliGRAM(s) Oral every 8 hours  sodium chloride 0.9% lock flush 10 milliLiter(s) IV Push every 1 hour PRN Pre/post blood products, medications, blood draw, and to maintain line patency    Genitourinary Medications    Hematologic/Oncologic Medications  epoetin reilly (EPOGEN) Injectable 36078 Unit(s) IV Push <User Schedule>  heparin   Injectable 5000 Unit(s) SubCutaneous every 12 hours    Antimicrobial/Immunologic Medications    Endocrine/Metabolic Medications  atorvastatin 20 milliGRAM(s) Oral at bedtime  dextrose 50% Injectable 12.5 Gram(s) IV Push once  dextrose 50% Injectable 25 Gram(s) IV Push once  dextrose Oral Gel 15 Gram(s) Oral once PRN Blood Glucose LESS THAN 70 milliGRAM(s)/deciliter  glucagon  Injectable 1 milliGRAM(s) IntraMuscular once  insulin lispro (ADMELOG) corrective regimen sliding scale   SubCutaneous every 6 hours  insulin NPH human recombinant 4 Unit(s) SubCutaneous every 6 hours    Topical/Other Medications  chlorhexidine 0.12% Liquid 15 milliLiter(s) Oral Mucosa every 12 hours  chlorhexidine 2% Cloths 1 Application(s) Topical <User Schedule>    --------------------------------------------------------------------------------------  VITAL SIGNS:    T(C): 36.5 (05-31-24 @ 08:00), Max: 37.3 (05-30-24 @ 12:00)  HR: 74 (05-31-24 @ 08:00) (62 - 76)  BP: 137/57 (05-31-24 @ 08:00) (137/57 - 224/70)  RR: 20 (05-31-24 @ 08:00) (13 - 26)  SpO2: 95% (05-31-24 @ 08:00) (93% - 100%)  --------------------------------------------------------------------------------------  Physical Exam:  General: NAD, resting comfortably in bed  Respiratory: Nonlabored respirations, s/p trach on vent  Cardio: regular rate  Vascular: R AVF w/ palpable thrill and pulse  --------------------------------------------------------------------------------------  LABS                        9.0    10.08 )-----------( 399      ( 31 May 2024 05:10 )             27.4     05-31    136  |  93<L>  |  62<H>  ----------------------------<  190<H>  4.1   |  23  |  6.99<H>    Ca    8.6      31 May 2024 05:10  Phos  5.1     05-31  Mg     2.40     05-31  --------------------------------------------------------------------------------------  INS AND OUTS:    05-30-24 @ 07:01 - 05-31-24 @ 07:00  --------------------------------------------------------  IN: 540 mL / OUT: 0 mL / NET: 540 mL    05-31-24 @ 07:01  -  05-31-24 @ 08:53  --------------------------------------------------------  IN: 50 mL / OUT: 0 mL / NET: 50 mL  --------------------------------------------------------------------------------------    CONCLUSIONS:      1. Partially occluded bilateral radial arteries.   2. Left Cephalic: Superficial vein thrombosis in the left mid-arm, distal, ante-cubital fossa, proximal forearm, mid forearm and distal forearm cephalic vein.   3. Right basilic vein is not adequate for use as conduit.   4. Left basilic vein is not adequate for use as conduit.    --------------------------------------------------------------------------------  MEASUREMENTS:    Y = Normal, N = Abnormal, P = Partial, * = Unable to Evaluate  +------------------+-------------+------------+  |Vein Mapping Right|Prox Arm (mm)|Mid Arm (mm)|  +------------------+-------------+------------+  |Basilic           |1.8          |1.8         |  +------------------+-------------+------------+    +-----------------+------------+-------------+  |Vein Mapping Left|Mid Arm (mm)|Dist Arm (mm)|  +-----------------+------------+-------------+  |Basilic          |1.4         |1.6          |  +-----------------+------------+-------------+    +---------------------+---------------+-----------+-------------+  |Inflow Arteries Right|Velocity (cm/s)|Waveform   |Plaque       |  +---------------------+---------------+-----------+-------------+  |Brachial             |140            |Monophasic |             |  +---------------------+---------------+-----------+-------------+  |Radial               |8              |String     |heterogeneous|  +---------------------+---------------+-----------+-------------+  |Ulnar                |45             |Multiphasic|             |  +---------------------+---------------+-----------+-------------+    +--------------------+---------------+-----------+-------------+  |Inflow Arteries Left|Velocity (cm/s)|Waveform   |Plaque       |  +--------------------+---------------+-----------+-------------+  |Brachial            |125            |Multiphasic|             |  +--------------------+---------------+-----------+-------------+  |Radial              |65             |Multiphasic|heterogeneous|  +--------------------+---------------+-----------+-------------+  |Ulnar               |103            |Multiphasic|             |  +--------------------+---------------+-----------+-------------+            --------------------------------------------------------------------------------  FINDINGS:  Right Upper Extremity Veins:  R Basilic: The right basilic vein measures 1.8 mm proximal arm, 1.8 mm mid arm, and the vein is not adequate for use as conduit.  R Cephalic: The right cephalic vein is not adequate for use as conduit.       Left Upper Extremity Veins:  L Basilic: The left basilic vein measures 1.4 mm mid arm, 1.6 mm distal arm, and the vein is not adequate for use as conduit.  L Cephalic: There is a superficial vein thrombosis in the left mid-arm, distal, ante-cubital fossa, proximal forearm, mid forearm and distal forearm cephalic vein.       Electronically signed on 5/31/2024 at 8:14:25 AM by Raina Mcintosh MD

## 2024-05-31 NOTE — PROGRESS NOTE ADULT - NS ATTEND AMEND GEN_ALL_CORE FT
-    73M PMH ESRD on HD, HTN, and DM2 presented to [facility] on 4/29/24 with chest pressure and hiccups, admitted 2/2 concern for demand ischemia with hospital course c/b baclofen toxicity and acute ischemic CVA left talya/cerebellum c/b acute hypoxemic and hypercapnic respiratory failure s/p ETT intubation/MV with failure to wean from ventilator s/p tracheostomy. Hospital course further complicated by aspiration and B cereus bacteremia and RLE DVT. Now transferred to RCU 5/16 for further management.     #Neuro: resolved acute encephalopathy. MRI head 5/6 with L talya and L cerebellum ischemic infarcts with no vessel occlusion/stenosis on MRA. Will restart aspirin if he tolerates Heparin gtt. Continue statin  #CV: HD stable, increase amlodipine to 10 mg qd. Continue carvedilol and hydralazine  #Pulm: continue TC ATC, goal SpO2>90%. Check ABG on TC. ACT with Duonebs + 3%NaCl nebs  #GI: improving melena, H/H stable, tolerating HSQ. Continue PPI BID. Continue PEG feeds. S&S re-eval  #Renal: ESRD, plan for IR-placement of Shiley catheter for HD today. If fevers resolve and blood cultures are negative, will discuss with vascular regarding fistulogram/thrombectomy next week  #ID: no further fevers since 100.3 axillary on 5/30. Endotracheal culture with Citrobacter koseri, sensitivities pending. Follow-up ID regarding need for antibiotics. Repeat blood cultures pending. Recently completed course of Zosyn 5/28 for aspiration pneumonia and B. cereus bacteremia  #Heme: non-occlusive R common iliac DVT on CT A/P, not seen on RLE duplex. Start Heparin gtt given that he is tolerating HSQ, H/H stable. If tolerates a/c, then can consider restarting aspirin  #Dispo: full code, discussed with patient and wife at bedside. PM&R eval for acute rehab when ready for discharge (hopefully by next week)

## 2024-05-31 NOTE — CONSULT NOTE ADULT - CONSULT REASON
AMAIRANI parmar placed on 5/13, recommend to be changed byNephrology team    Thanks l
PEG
Chest pain
G-J tube
HD needs
Urgent dialysis
non tunneled HD catheter exchange
IP Consult for tracheostomy
eval for rehab
AMS
leukocytosis, AMS
Nora

## 2024-05-31 NOTE — CONSULT NOTE ADULT - CONSULT REQUESTED DATE/TIME
02-May-2024 12:09
02-May-2024 14:27
14-May-2024 22:45
29-Apr-2024 18:46
Patient updated that carrier was seen today and SHG looks good.  Patient aware that carrier labs results are still pending and that carrier's spouse will complete labs tomorrow.  Will update patient once results are received.  Briefly discussed parenting agreement and pt plans to reach out to  today.  Encouraged pt to call with any questions or concerns.  
16-May-2024 07:46
31-May-2024 14:50
02-May-2024 00:04
02-May-2024 01:50
21-May-2024 12:31
26-May-2024 15:09
30-Apr-2024 08:02
15-May-2024 17:27

## 2024-05-31 NOTE — CHART NOTE - NSCHARTNOTEFT_GEN_A_CORE
PRE-INTERVENTIONAL RADIOLOGY PROCEDURE NOTE    Patient Age:  73  Patient Gender: Male    Procedure (including site / side if known):  non-tunnelled HD catheter    Diagnosis / Indication: End Stage Renal Disease.     Interventional Radiology Attending Physician:     Ordering Attending Physician: Dr. Perry    Pertinent medical history:  73y Male with pmh of ESRD requiring HD, currently via a right internal jugular vein non tunneled HD catheter ,recently removed given recent febrile episodes, undergoing line holiday . Patient spiked overnight fever. Given concern for infection risk, non-tunnelled HD catheter planned for Friday 5/31.     Pertinent labs:                       9.3    10.31 )-----------( 404      ( 30 May 2024 18:10 )             28.2   05-30    135  |  95<L>  |  50<H>  ----------------------------<  161<H>  3.8   |  22  |  6.43<H>    Ca    7.9<L>      30 May 2024 06:57  Phos  4.5     05-30  Mg     2.20     05-30        Patient and Family aware? Yes          Contact #: [phone]    Ashlie Sharpe PA-C  Department Medicine [id number] PRE-INTERVENTIONAL RADIOLOGY PROCEDURE NOTE    Patient Age:  73  Patient Gender: Male    Procedure (including site / side if known):  non-tunnelled HD catheter    Diagnosis / Indication: End Stage Renal Disease.     Interventional Radiology Attending Physician: Dr Mccabe    Ordering Attending Physician: Dr. Perry    Pertinent medical history:  73y Male with pmh of ESRD requiring HD, currently via a right internal jugular vein non tunneled HD catheter ,recently removed given recent febrile episodes, undergoing line holiday . Patient spiked overnight fever. Given concern for infection risk, non-tunnelled HD catheter planned for Friday 5/31.     Pertinent labs:                       9.3    10.31 )-----------( 404      ( 30 May 2024 18:10 )             28.2   05-30    135  |  95<L>  |  50<H>  ----------------------------<  161<H>  3.8   |  22  |  6.43<H>    Ca    7.9<L>      30 May 2024 06:57  Phos  4.5     05-30  Mg     2.20     05-30        Patient and Family aware? Yes          Contact #: [id number]    Ashlie Sharpe PA-C  Department Medicine [id number]

## 2024-05-31 NOTE — CONSULT NOTE ADULT - REASON FOR ADMISSION
Unstable angina, abn EKG

## 2024-05-31 NOTE — PROGRESS NOTE ADULT - SUBJECTIVE AND OBJECTIVE BOX
Neurology Progress Note    S: Patient seen and examined.     Medications: MEDICATIONS  (STANDING):  albuterol/ipratropium for Nebulization 3 milliLiter(s) Nebulizer every 12 hours  atorvastatin 20 milliGRAM(s) Oral at bedtime  carvedilol 12.5 milliGRAM(s) Oral every 12 hours  chlorhexidine 0.12% Liquid 15 milliLiter(s) Oral Mucosa every 12 hours  chlorhexidine 2% Cloths 1 Application(s) Topical <User Schedule>  dextrose 10% Bolus 125 milliLiter(s) IV Bolus once  dextrose 5%. 1000 milliLiter(s) (50 mL/Hr) IV Continuous <Continuous>  dextrose 5%. 1000 milliLiter(s) (100 mL/Hr) IV Continuous <Continuous>  dextrose 50% Injectable 12.5 Gram(s) IV Push once  dextrose 50% Injectable 25 Gram(s) IV Push once  epoetin reilly (EPOGEN) Injectable 02297 Unit(s) IV Push <User Schedule>  ferrous    sulfate Liquid 300 milliGRAM(s) Enteral Tube daily  glucagon  Injectable 1 milliGRAM(s) IntraMuscular once  heparin   Injectable 5000 Unit(s) SubCutaneous every 12 hours  hydrALAZINE 100 milliGRAM(s) Oral three times a day  insulin lispro (ADMELOG) corrective regimen sliding scale   SubCutaneous every 6 hours  insulin NPH human recombinant 4 Unit(s) SubCutaneous every 6 hours  pantoprazole  Injectable 40 milliGRAM(s) IV Push every 12 hours  sodium bicarbonate 650 milliGRAM(s) Oral every 8 hours  sodium chloride 3%  Inhalation 4 milliLiter(s) Inhalation every 12 hours    MEDICATIONS  (PRN):  acetaminophen   Oral Liquid .. 650 milliGRAM(s) Oral every 6 hours PRN Temp greater or equal to 38C (100.4F), Moderate Pain (4 - 6)  dextrose Oral Gel 15 Gram(s) Oral once PRN Blood Glucose LESS THAN 70 milliGRAM(s)/deciliter  melatonin 3 milliGRAM(s) Oral at bedtime PRN Insomnia  sodium chloride 0.9% lock flush 10 milliLiter(s) IV Push every 1 hour PRN Pre/post blood products, medications, blood draw, and to maintain line patency       Vitals:  Vital Signs Last 24 Hrs  T(C): 36.5 (31 May 2024 08:00), Max: 37.3 (30 May 2024 12:00)  T(F): 97.7 (31 May 2024 08:00), Max: 99.1 (30 May 2024 12:00)  HR: 70 (31 May 2024 11:06) (62 - 75)  BP: 137/57 (31 May 2024 08:00) (137/57 - 224/70)  BP(mean): --  RR: 20 (31 May 2024 08:00) (13 - 26)  SpO2: 95% (31 May 2024 11:06) (93% - 100%)    Parameters below as of 31 May 2024 08:00  Patient On (Oxygen Delivery Method): tracheostomy collar  O2 Flow (L/min): 7  O2 Concentration (%): 28      General Exam:   General Appearance: + trach  Head: Normocephalic, atraumatic and no dysmorphic features  Ear, Nose, and Throat: Moist mucous membranes  CVS: S1S2+  Resp: mechanical  Abd: soft NTND  Extremities: No edema, no cyanosis  Skin: No bruises, no rashes     Neurological Exam:  Mental Status: Opens to voice, tracks, follows simple commands. Mouths words  Cranial Nerves: pupils 1-2mm, R facial palsy with smile  Motor: normal tone, RUE and RLE drift.  Sensation:  intact      I personally reviewed the below data/images/labs:    LABS:                          9.0    10.08 )-----------( 399      ( 31 May 2024 05:10 )             27.4     05-31    136  |  93<L>  |  62<H>  ----------------------------<  190<H>  4.1   |  23  |  6.99<H>    Ca    8.6      31 May 2024 05:10  Phos  5.1     05-31  Mg     2.40     05-31          Urinalysis Basic - ( 31 May 2024 05:10 )    Color: x / Appearance: x / SG: x / pH: x  Gluc: 190 mg/dL / Ketone: x  / Bili: x / Urobili: x   Blood: x / Protein: x / Nitrite: x   Leuk Esterase: x / RBC: x / WBC x   Sq Epi: x / Non Sq Epi: x / Bacteria: x                        CT Head No Cont:  (01 May 2024 18:11)  < from: CT Head No Cont (05.01.24 @ 18:11) >    ACC: [id number] EXAM:  CT BRAIN   ORDERED BY: SHELBY MOREL     PROCEDURE DATE:  05/01/2024          INTERPRETATION:  CT OF THE HEAD WITHOUT CONTRAST    CLINICAL INDICATION: Lethargy.    TECHNIQUE: Volumetric CT acquisition was performed through the brain and   reviewed using brain and bone window technique.      COMPARISON: CT head from 1/17/2024    FINDINGS:    The ventricular and sulcal size and configuration is age appropriate.     There is no acute loss of gray-white differentiation. Stable bilateral   inferior frontal encephalomalacia and gliosis likely related to remote   trauma.    There is no evidence of mass effect, midline shift, acute intracranial   hemorrhage, or extra-axial collections.     The calvarium is intact. The paranasalsinuses are clear.The mastoid air   cells are predominantly clear. The orbits are unremarkable.      IMPRESSION:  No acute intracranial hemorrhage or acute territorial infarction. Chronic   findings as above.    --- End of Report ---          GILDARDO KHAN; Resident Radiologist  This document has been electronically signed.  LADARIUS DENNY MD; Attending Radiologist  This document has been electronically signed. May  1 2024  7:54PM    < end of copied text >      EEG Classification / Summary:  Abnormal EEG in the awake, drowsy states.   -Generalized sharp waves with triphasic morphology, initially appearing as frequent GPDs at 1-1.5 Hz, decreasing in prevalence later in recording.  -Variable moderate to mild diffuse slowing.  -No electrographic seizures are captured.     Clinical Impression:  -Generalized sharp waves with triphasic morphology can be seen in toxic-metabolic encephalopathies and indicate some risk of generalized seizures, especially when at higher frequencies than in this study. These decrease in prevalence over the course of recording.  -Variable moderate to mild diffuse cerebral dysfunction is nonspecific in etiology.   -No seizures x2 days of recording.    m< from: MR Head w/wo IV Cont (05.06.24 @ 18:10) >    ACC: [id number] EXAM:  MR BRAIN WAW IC   ORDERED BY: DOLORES QUICK     PROCEDURE DATE:  05/06/2024          INTERPRETATION:  CLINICAL INDICATION: Altered mental status.    TECHNIQUE: Multi-planar, multi-sequence magnetic resonance imaging of the   brain was performed with and without intravenous contrast.    CONTRAST: Post-contrast MR images were obtained following infusion of 5   mL of Gadavist    COMPARISON: No prior studies are available for comparison    FINDINGS:    VENTRICLES AND SULCI:  Normal.  INTRA-AXIAL: Punctate foci of restricted diffusion in the left talya and   left cerebellum with associated T2 prolongation consistent with acute   infarcts. No acute intracranial hemorrhage. Encephalomalacia/gliosis   noted in the inferior frontal lobes. No abnormal enhancement. There are   patchy and confluent foci of hyperintense T2 signal within the   subcortical and periventricular white matter which are nonspecific but   likely related to chronic microvascular ischemic disease.  EXTRA-AXIAL:  No mass or collection.  VISUALIZED SINUSES: Mild polypoid mucosal thickening. Fluid noted in the   nasopharynx.  VISUALIZED MASTOIDS:  Clear.  CALVARIUM:  Normal.  CAROTID FLOW VOIDS: Normal.  MISCELLANEOUS:  None.    IMPRESSION: PUNCTATE FOCI OF RESTRICTED DIFFUSION IN THE LEFT TALYA AND   LEFT CEREBELLUM WITH ASSOCIATED T2 PROLONGATION CONSISTENT WITH ACUTE   INFARCTS. NO ACUTE INTRACRANIAL HEMORRHAGE. NO AREA OF ABNORMAL   ENHANCEMENT.    < end of copied text >    m< from: MR Angio Head No Cont (05.09.24 @ 15:58) >    ACC: [id number] EXAM:  MR ANGIO NECK WAW IC   ORDERED BY: OMAR NESBITT     ACC: [id number] EXAM:  MR ANGIO BRAIN   ORDERED BY: OMAR NESBITT     PROCEDURE DATE:  05/09/2024          INTERPRETATION:  .    CLINICAL INFORMATION: Stroke workup. Talya/cerebellar stroke.    TECHNIQUE: MRA images through the neck and [place] of [place] were obtained   using a combination of 2-D and 3-D time-of-flight acquisition. Post   contrast MR angiography of the neck was also performed. The data was then   reformatted intoa volumetric data set and reviewed as rotational MIP   images. 5 cc's of IV Gadavist was administered without immediate   complication. 2.5 cc's was discarded.    COMPARISON: Prior head CT exam dated 5/1/2024.    FINDINGS:    MRA Neck: There is a standard anatomic configuration to the aortic arch.    The origins of the great vessels appear unremarkable. The bilateral   common carotid arteries and carotid bifurcations appear unremarkable.    The bilateral cervical internal carotid arteries are within normal limits.    The origins of the bilateral vertebral arteries are normal. The bilateral   cervical vertebral arteries are normal in course and caliber.    MRA [place] of [place]: The bilateral intracranial internal carotid,   anterior, and middle cerebral arteries appear unremarkable.    The anterior and bilateral posterior communicating arteries are notable.    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 Otherwise, the   bilateral intradural vertebral arteries, vertebrobasilar junction,   basilar artery, and basilar tip appear unremarkable as well as the   bilateral posterior cerebral arteries.    IMPRESSION: No large vessel occlusion or stenosis.    < end of copied text >

## 2024-05-31 NOTE — PROGRESS NOTE ADULT - SUBJECTIVE AND OBJECTIVE BOX
CHIEF COMPLAINT: Patient is a 73y old  Male who presents with a chief complaint of Unstable angina, abn EKG (31 May 2024 08:52)      INTERVAL EVENTS:    REVIEW OF SYSTEMS: Seen by bedside during AM rounds and     Mode: standby      OBJECTIVE:  ICU Vital Signs Last 24 Hrs  T(C): 36.5 (31 May 2024 08:00), Max: 37.3 (30 May 2024 12:00)  T(F): 97.7 (31 May 2024 08:00), Max: 99.1 (30 May 2024 12:00)  HR: 74 (31 May 2024 08:00) (62 - 75)  BP: 137/57 (31 May 2024 08:00) (137/57 - 224/70)  BP(mean): --  ABP: --  ABP(mean): --  RR: 20 (31 May 2024 08:00) (13 - 26)  SpO2: 95% (31 May 2024 08:00) (93% - 100%)    O2 Parameters below as of 31 May 2024 08:00  Patient On (Oxygen Delivery Method): tracheostomy collar  O2 Flow (L/min): 7  O2 Concentration (%): 28      Mode: standby    05-30 @ 07:01 - 05-31 @ 07:00  --------------------------------------------------------  IN: 540 mL / OUT: 0 mL / NET: 540 mL    05-31 @ 07:01 - 05-31 @ 09:19  --------------------------------------------------------  IN: 50 mL / OUT: 0 mL / NET: 50 mL      CAPILLARY BLOOD GLUCOSE      POCT Blood Glucose.: 198 mg/dL (31 May 2024 05:04)      HOSPITAL MEDICATIONS:  MEDICATIONS  (STANDING):  albuterol/ipratropium for Nebulization 3 milliLiter(s) Nebulizer every 12 hours  amLODIPine   Tablet 5 milliGRAM(s) Oral daily  atorvastatin 20 milliGRAM(s) Oral at bedtime  carvedilol 12.5 milliGRAM(s) Oral every 12 hours  chlorhexidine 0.12% Liquid 15 milliLiter(s) Oral Mucosa every 12 hours  chlorhexidine 2% Cloths 1 Application(s) Topical <User Schedule>  dextrose 10% Bolus 125 milliLiter(s) IV Bolus once  dextrose 5%. 1000 milliLiter(s) (50 mL/Hr) IV Continuous <Continuous>  dextrose 5%. 1000 milliLiter(s) (100 mL/Hr) IV Continuous <Continuous>  dextrose 50% Injectable 25 Gram(s) IV Push once  dextrose 50% Injectable 12.5 Gram(s) IV Push once  epoetin relily (EPOGEN) Injectable 03176 Unit(s) IV Push <User Schedule>  ferrous    sulfate Liquid 300 milliGRAM(s) Enteral Tube daily  glucagon  Injectable 1 milliGRAM(s) IntraMuscular once  heparin   Injectable 5000 Unit(s) SubCutaneous every 12 hours  hydrALAZINE 100 milliGRAM(s) Oral three times a day  insulin lispro (ADMELOG) corrective regimen sliding scale   SubCutaneous every 6 hours  insulin NPH human recombinant 4 Unit(s) SubCutaneous every 6 hours  NIFEdipine IR 10 milliGRAM(s) Oral every 8 hours  pantoprazole  Injectable 40 milliGRAM(s) IV Push every 12 hours  sodium bicarbonate 650 milliGRAM(s) Oral every 8 hours  sodium chloride 3%  Inhalation 4 milliLiter(s) Inhalation every 12 hours    MEDICATIONS  (PRN):  acetaminophen   Oral Liquid .. 650 milliGRAM(s) Oral every 6 hours PRN Temp greater or equal to 38C (100.4F), Moderate Pain (4 - 6)  dextrose Oral Gel 15 Gram(s) Oral once PRN Blood Glucose LESS THAN 70 milliGRAM(s)/deciliter  melatonin 3 milliGRAM(s) Oral at bedtime PRN Insomnia  sodium chloride 0.9% lock flush 10 milliLiter(s) IV Push every 1 hour PRN Pre/post blood products, medications, blood draw, and to maintain line patency      PHYSICAL EXAMINATION    LABS:                        9.0    10.08 )-----------( 399      ( 31 May 2024 05:10 )             27.4     05-31    136  |  93<L>  |  62<H>  ----------------------------<  190<H>  4.1   |  23  |  6.99<H>    Ca    8.6      31 May 2024 05:10  Phos  5.1     05-31  Mg     2.40     05-31        Urinalysis Basic - ( 31 May 2024 05:10 )    Color: x / Appearance: x / SG: x / pH: x  Gluc: 190 mg/dL / Ketone: x  / Bili: x / Urobili: x   Blood: x / Protein: x / Nitrite: x   Leuk Esterase: x / RBC: x / WBC x   Sq Epi: x / Non Sq Epi: x / Bacteria: x            PAST MEDICAL & SURGICAL HISTORY:  Diabetes      Benign essential HTN      HLD (hyperlipidemia)      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 CHIEF COMPLAINT: Patient is a 73y old  Male who presents with a chief complaint of Unstable angina, abn EKG (31 May 2024 08:52)      INTERVAL EVENTS:  - No interval events overnight   - Pending DEMARCUS OWENS today   - Pending infectious work up and possible fistulogram and repair next week     REVIEW OF SYSTEMS: Seen by bedside during AM rounds and denies pain or SOB    Mode: standby      OBJECTIVE:  ICU Vital Signs Last 24 Hrs  T(C): 36.5 (31 May 2024 08:00), Max: 37.3 (30 May 2024 12:00)  T(F): 97.7 (31 May 2024 08:00), Max: 99.1 (30 May 2024 12:00)  HR: 74 (31 May 2024 08:00) (62 - 75)  BP: 137/57 (31 May 2024 08:00) (137/57 - 224/70)  BP(mean): --  ABP: --  ABP(mean): --  RR: 20 (31 May 2024 08:00) (13 - 26)  SpO2: 95% (31 May 2024 08:00) (93% - 100%)    O2 Parameters below as of 31 May 2024 08:00  Patient On (Oxygen Delivery Method): tracheostomy collar  O2 Flow (L/min): 7  O2 Concentration (%): 28      Mode: standby    05-30 @ 07:01 - 05-31 @ 07:00  --------------------------------------------------------  IN: 540 mL / OUT: 0 mL / NET: 540 mL    05-31 @ 07:01 - 05-31 @ 09:19  --------------------------------------------------------  IN: 50 mL / OUT: 0 mL / NET: 50 mL      CAPILLARY BLOOD GLUCOSE  POCT Blood Glucose.: 198 mg/dL (31 May 2024 05:04)      HOSPITAL MEDICATIONS:  MEDICATIONS  (STANDING):  albuterol/ipratropium for Nebulization 3 milliLiter(s) Nebulizer every 12 hours  amLODIPine   Tablet 5 milliGRAM(s) Oral daily  atorvastatin 20 milliGRAM(s) Oral at bedtime  carvedilol 12.5 milliGRAM(s) Oral every 12 hours  chlorhexidine 0.12% Liquid 15 milliLiter(s) Oral Mucosa every 12 hours  chlorhexidine 2% Cloths 1 Application(s) Topical <User Schedule>  dextrose 10% Bolus 125 milliLiter(s) IV Bolus once  dextrose 5%. 1000 milliLiter(s) (50 mL/Hr) IV Continuous <Continuous>  dextrose 5%. 1000 milliLiter(s) (100 mL/Hr) IV Continuous <Continuous>  dextrose 50% Injectable 25 Gram(s) IV Push once  dextrose 50% Injectable 12.5 Gram(s) IV Push once  epoetin reilly (EPOGEN) Injectable 69719 Unit(s) IV Push <User Schedule>  ferrous    sulfate Liquid 300 milliGRAM(s) Enteral Tube daily  glucagon  Injectable 1 milliGRAM(s) IntraMuscular once  heparin   Injectable 5000 Unit(s) SubCutaneous every 12 hours  hydrALAZINE 100 milliGRAM(s) Oral three times a day  insulin lispro (ADMELOG) corrective regimen sliding scale   SubCutaneous every 6 hours  insulin NPH human recombinant 4 Unit(s) SubCutaneous every 6 hours  NIFEdipine IR 10 milliGRAM(s) Oral every 8 hours  pantoprazole  Injectable 40 milliGRAM(s) IV Push every 12 hours  sodium bicarbonate 650 milliGRAM(s) Oral every 8 hours  sodium chloride 3%  Inhalation 4 milliLiter(s) Inhalation every 12 hours    MEDICATIONS  (PRN):  acetaminophen   Oral Liquid .. 650 milliGRAM(s) Oral every 6 hours PRN Temp greater or equal to 38C (100.4F), Moderate Pain (4 - 6)  dextrose Oral Gel 15 Gram(s) Oral once PRN Blood Glucose LESS THAN 70 milliGRAM(s)/deciliter  melatonin 3 milliGRAM(s) Oral at bedtime PRN Insomnia  sodium chloride 0.9% lock flush 10 milliLiter(s) IV Push every 1 hour PRN Pre/post blood products, medications, blood draw, and to maintain line patency      PHYSICAL EXAMINATION  General: NAD   HEENT: Trach present and collar tolerated well   Cards: S1/S2, no murmurs   Pulm: CTA bilaterally. No wheezes.   Abdomen: Soft, nondistended and nontender. BS (+) PEG present.   Extremities: No pedal edema. THAI of BL upper and lower extremities with improving strength.  Neurology: AOx3 with no acute focal neurological deficits     LABS:                        9.0    10.08 )-----------( 399      ( 31 May 2024 05:10 )             27.4     05-31    136  |  93<L>  |  62<H>  ----------------------------<  190<H>  4.1   |  23  |  6.99<H>    Ca    8.6      31 May 2024 05:10  Phos  5.1     05-31  Mg     2.40     05-31        Urinalysis Basic - ( 31 May 2024 05:10 )  Color: x / Appearance: x / SG: x / pH: x  Gluc: 190 mg/dL / Ketone: x  / Bili: x / Urobili: x   Blood: x / Protein: x / Nitrite: x   Leuk Esterase: x / RBC: x / WBC x   Sq Epi: x / Non Sq Epi: x / Bacteria: x            PAST MEDICAL & SURGICAL HISTORY:  Diabetes      Benign essential HTN      HLD (hyperlipidemia)      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

## 2024-05-31 NOTE — PROGRESS NOTE ADULT - SUBJECTIVE AND OBJECTIVE BOX
[facility]   MD ELIANE BHAGAT MD ANGELA WONG, ROBBIN GREEN, NP    TEL:  OFFICE: [phone]  From 5pm-7am Answering Service [phone]    -- RENAL FOLLOW UP NOTE ---Date of Service 05-31-24 @ 15:15    Patient is a 73y old  Male who presents with a chief complaint of Unstable angina, abn EKG.    Patient seen and examined at bedside. No chest pain/SOB.    VITALS:  T(F): 97 (05-31-24 @ 11:51), Max: 99 (05-30-24 @ 18:00)  HR: 71 (05-31-24 @ 11:51)  BP: 185/64 (05-31-24 @ 11:51)  RR: 20 (05-31-24 @ 11:51)  SpO2: 97% (05-31-24 @ 11:51)  Wt(kg): --    05-30 @ 07:01  -  05-31 @ 07:00  --------------------------------------------------------  IN: 540 mL / OUT: 0 mL / NET: 540 mL    05-31 @ 07:01  -  05-31 @ 15:15  --------------------------------------------------------  IN: 160 mL / OUT: 0 mL / NET: 160 mL      PHYSICAL EXAM:  General: NAD  Neck: No JVD  Respiratory: CTAB, no wheezes, rales or rhonchi  Cardiovascular: S1, S2, RRR  Gastrointestinal: BS+, soft, NT/ND  Extremities: No peripheral edema    Hospital Medications:   MEDICATIONS  (STANDING):  albuterol/ipratropium for Nebulization 3 milliLiter(s) Nebulizer every 12 hours  atorvastatin 20 milliGRAM(s) Oral at bedtime  carvedilol 12.5 milliGRAM(s) Oral every 12 hours  chlorhexidine 0.12% Liquid 15 milliLiter(s) Oral Mucosa every 12 hours  chlorhexidine 2% Cloths 1 Application(s) Topical <User Schedule>  dextrose 10% Bolus 125 milliLiter(s) IV Bolus once  dextrose 5%. 1000 milliLiter(s) (50 mL/Hr) IV Continuous <Continuous>  dextrose 5%. 1000 milliLiter(s) (100 mL/Hr) IV Continuous <Continuous>  dextrose 50% Injectable 12.5 Gram(s) IV Push once  dextrose 50% Injectable 25 Gram(s) IV Push once  epoetin reilly (EPOGEN) Injectable 41739 Unit(s) IV Push <User Schedule>  ferrous    sulfate Liquid 300 milliGRAM(s) Enteral Tube daily  glucagon  Injectable 1 milliGRAM(s) IntraMuscular once  heparin   Injectable 5000 Unit(s) SubCutaneous every 12 hours  hydrALAZINE 100 milliGRAM(s) Oral three times a day  insulin lispro (ADMELOG) corrective regimen sliding scale   SubCutaneous every 6 hours  insulin NPH human recombinant 4 Unit(s) SubCutaneous every 6 hours  pantoprazole  Injectable 40 milliGRAM(s) IV Push every 12 hours  sodium bicarbonate 650 milliGRAM(s) Oral every 8 hours  sodium chloride 3%  Inhalation 4 milliLiter(s) Inhalation every 12 hours      LABS:  05-31    136  |  93<L>  |  62<H>  ----------------------------<  190<H>  4.1   |  23  |  6.99<H>    Ca    8.6      31 May 2024 05:10  Phos  5.1     05-31  Mg     2.40     05-31      Creatinine Trend: 6.99 <--, 6.43 <--, 5.82 <--, 5.29 <--, 3.96 <--, 5.73 <--, 4.48 <--, 3.16 <--    Phosphorus: 5.1 mg/dL (05-31 @ 05:10)                          9.0    10.08 )-----------( 399      ( 31 May 2024 05:10 )             27.4     Urine Studies:  Urinalysis - [05-31-24 @ 05:10]      Color  / Appearance  / SG  / pH       Gluc 190 / Ketone   / Bili  / Urobili        Blood  / Protein  / Leuk Est  / Nitrite       RBC  / WBC  / Hyaline  / Gran  / Sq Epi  / Non Sq Epi  / Bacteria       Iron 16, TIBC 121, %sat 13      [05-17-24 @ 06:00]  Ferritin 720      [05-17-24 @ 06:00]  PTH -- (Ca --)      [05-26-24 @ 01:20]   122  TSH 2.60      [05-17-24 @ 06:00]  Lipid: chol 86, , HDL 27, LDL --      [05-17-24 @ 06:00]

## 2024-05-31 NOTE — PROGRESS NOTE ADULT - ASSESSMENT
74 y/o M PMhx HTN, ESRD (on HD M/W/F), DM who presented with hiccups x 2 days and chest pain found to have peaked T waves. Hospital course c/b AMS s/p RRT on 5/1 and 5/2. Vascular consulted 2/2 RUE AVF access unable to be used s/p R femoral shiley placed for emergent HD. Transferred to MICU and intubated 5/2 for airway protection.   received empiric course of zosyn x 5 days, completed 5/7 and meropenem x 5 days, completed 5/14  B. cereus bacteremia, s/p vanc, completed 5/20    fever  possible RLL consolidation on CXRs  R non-tunneled HD catheter removed 5/27  blood cultures- NGTD  s/p zosyn x 7 days, completed 5/27  Tmax 100.3 today    anemia  started on protonix after melena  drop in H&H today    AMS, CVA  TTE- no evidence of valvular disease  s/p LP 5/2, viral encephalitis/ meningitis ruled out  MRI- Punctate foci of restricted diffusion in the left talya and left cerebellum with associated T2 prolongation consistent with acute infarcts  s/p trach 5/14, s/p PEG 5/17    DVT  CT 5/12- Nonocclusive R common iliac vein deep venous thrombosis.  US LE 5/28- no evidence of DVT    Recommendations  monitor off antibiotics  f/u blood cultures  trend temps/ wbc  no objection to non-tunneled placement  would wait until blood cx NGTD x 48 hours prior to placing tunneled catheter    Dr. Saray Toribio will be covering 6/1 & 6/2. I will resume care on 6/3     Steven Torres M.D.  [facility], Division of Infectious Diseases  [phone]  After 5pm on weekdays and all day on weekends - please call [phone]

## 2024-05-31 NOTE — CONSULT NOTE ADULT - SUBJECTIVE AND OBJECTIVE BOX
Patient is a 73y old  Male who presents with a chief complaint of Unstable angina, abn EKG (31 May 2024 13:11)      HPI:  Patient is 73 year old male, history of ESRD on HD MWF, HTN, and 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 Baclofen started and course complicated by AMS second to baclofen toxicity requiring intubation and MICU transfer. Course further complicated by LEFT talya and cerebellum stroke, R AVF stenosis, aspiration and B Cereus bacteremia and RLE DVT. s/p trach and transferred to RCU 5/16    REVIEW OF SYSTEMS      PAST MEDICAL & SURGICAL HISTORY  Diabetes    Stage 5 chronic kidney disease not on chronic dialysis    Benign essential HTN    HLD (hyperlipidemia)    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    ESRD on hemodialysis    No significant past surgical history    AVF (arteriovenous fistula)    Arteriovenous fistula        SOCIAL HISTORY  Smoking - Denied  EtOH - Denied   Drugs - Denied    FUNCTIONAL HISTORY  Lives with family in house with 4 steps, 1 flight inside  Independent    CURRENT FUNCTIONAL STATUS  5/30 Bed Mobility  Bed Mobility Training Rehab Potential: good, to achieve stated therapy goals  Bed Mobility Training Symptoms Noted During/After Treatment: none  Bed Mobility Training Scooting: maximum assist (25% patient effort);  supervision;  verbal cues;  1 person assist;  bed rails  Bed Mobility Training Sit-to-Supine: maximum assist (25% patient effort);  supervision;  verbal cues;  1 person assist;  bed rails  Bed Mobility Training Supine-to-Sit: maximum assist (25% patient effort);  supervision;  verbal cues;  1 person assist;  bed rails  Bed Mobility Training Limitations: decreased ability to use arms for pushing/pulling;  decreased ability to use legs for bridging/pushing;  impaired ability to control trunk for mobility;  decreased flexibility;  decreased ROM;  decreased strength;  impaired balance;  impaired postural control    Sit-Stand Transfer Training  Sit-to-Stand Transfer Training Treatment not Performed: unable to assess; poor sitting balance and right leg weakness,    Therapeutic Exercise  Therapeutic Exercise Rehab Effort: good  Therapeutic Exercise Symptoms Noted During/After Treatment: none  Therapeutic Exercise Detail: Patient participated in supine therapeutic exercises 2 x10 throughout bilateral upper and lower extremities.       FAMILY HISTORY   FHx: diabetes mellitus (Father, Aunt)      RECENT LABS/IMAGING  CBC Full  -  ( 31 May 2024 05:10 )  WBC Count : 10.08 K/uL  RBC Count : 4.06 M/uL  Hemoglobin : 9.0 g/dL  Hematocrit : 27.4 %  Platelet Count - Automated : 399 K/uL  Mean Cell Volume : 67.5 fL  Mean Cell Hemoglobin : 22.2 pg  Mean Cell Hemoglobin Concentration : 32.8 gm/dL  Auto Neutrophil # : x  Auto Lymphocyte # : x  Auto Monocyte # : x  Auto Eosinophil # : x  Auto Basophil # : x  Auto Neutrophil % : x  Auto Lymphocyte % : x  Auto Monocyte % : x  Auto Eosinophil % : x  Auto Basophil % : x    05-31    136  |  93<L>  |  62<H>  ----------------------------<  190<H>  4.1   |  23  |  6.99<H>    Ca    8.6      31 May 2024 05:10  Phos  5.1     05-31  Mg     2.40     05-31      Urinalysis Basic - ( 31 May 2024 05:10 )    Color: x / Appearance: x / SG: x / pH: x  Gluc: 190 mg/dL / Ketone: x  / Bili: x / Urobili: x   Blood: x / Protein: x / Nitrite: x   Leuk Esterase: x / RBC: x / WBC x   Sq Epi: x / Non Sq Epi: x / Bacteria: x        VITALS  T(C): 36.1 (05-31-24 @ 11:51), Max: 37.2 (05-30-24 @ 18:00)  HR: 71 (05-31-24 @ 11:51) (62 - 75)  BP: 185/64 (05-31-24 @ 11:51) (137/57 - 224/70)  RR: 20 (05-31-24 @ 11:51) (13 - 26)  SpO2: 97% (05-31-24 @ 11:51) (93% - 100%)  Wt(kg): --    ALLERGIES  No Known Allergies      MEDICATIONS   acetaminophen   Oral Liquid .. 650 milliGRAM(s) Oral every 6 hours PRN  albuterol/ipratropium for Nebulization 3 milliLiter(s) Nebulizer every 12 hours  atorvastatin 20 milliGRAM(s) Oral at bedtime  carvedilol 12.5 milliGRAM(s) Oral every 12 hours  chlorhexidine 0.12% Liquid 15 milliLiter(s) Oral Mucosa every 12 hours  chlorhexidine 2% Cloths 1 Application(s) Topical <User Schedule>  dextrose 10% Bolus 125 milliLiter(s) IV Bolus once  dextrose 5%. 1000 milliLiter(s) IV Continuous <Continuous>  dextrose 5%. 1000 milliLiter(s) IV Continuous <Continuous>  dextrose 50% Injectable 25 Gram(s) IV Push once  dextrose 50% Injectable 12.5 Gram(s) IV Push once  dextrose Oral Gel 15 Gram(s) Oral once PRN  epoetin reilly (EPOGEN) Injectable 01262 Unit(s) IV Push <User Schedule>  ferrous    sulfate Liquid 300 milliGRAM(s) Enteral Tube daily  glucagon  Injectable 1 milliGRAM(s) IntraMuscular once  heparin   Injectable 5000 Unit(s) SubCutaneous every 12 hours  hydrALAZINE 100 milliGRAM(s) Oral three times a day  insulin lispro (ADMELOG) corrective regimen sliding scale   SubCutaneous every 6 hours  insulin NPH human recombinant 4 Unit(s) SubCutaneous every 6 hours  melatonin 3 milliGRAM(s) Oral at bedtime PRN  pantoprazole  Injectable 40 milliGRAM(s) IV Push every 12 hours  sodium bicarbonate 650 milliGRAM(s) Oral every 8 hours  sodium chloride 0.9% lock flush 10 milliLiter(s) IV Push every 1 hour PRN  sodium chloride 3%  Inhalation 4 milliLiter(s) Inhalation every 12 hours      ----------------------------------------------------------------------------------------  PHYSICAL EXAM  Constitutional - NAD, Comfortable  HEENT - NCAT, EOMI  Neck - Supple, No limited ROM  Chest - CTA bilaterally, No wheeze, No rhonchi, No crackles  Cardiovascular - RRR, S1S2, No murmurs  Abdomen - BS+, Soft, NTND  Extremities - No C/C/E, No calf tenderness   Neurologic Exam -                    Cognitive - Awake, Alert, AAO to self, place, date, year, situation     Communication - Fluent, No dysarthria     Cranial Nerves - CN 2-12 intact     Motor - No focal deficits                    LEFT    UE - ShAB 5/5, EF 5/5, EE 5/5, WE 5/5,  5/5                    RIGHT UE - ShAB 5/5, EF 5/5, EE 5/5, WE 5/5,  5/5                    LEFT    LE - HF 5/5, KE 5/5, DF 5/5, PF 5/5                    RIGHT LE - HF 5/5, KE 5/5, DF 5/5, PF 5/5        Sensory - Intact to LT     Reflexes - DTR Intact, No primitive reflexive     Coordination - FTN intact     OculoVestibular - No saccades, No nystagmus, VOR         Balance - WNL Static  Psychiatric - Mood stable, Affect WNL  ----------------------------------------------------------------------------------------  ASSESSMENT/PLAN    Pain -  DVT PPX -   Rehab -  incomplete note Patient is a 73y old  Male who presents with a chief complaint of Unstable angina, abn EKG (31 May 2024 13:11)      HPI:  Patient is 73 year old male, history of ESRD on HD MWF, HTN, and 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 Baclofen started and course complicated by AMS second to baclofen toxicity requiring intubation and MICU transfer. Course further complicated by LEFT talya and cerebellum stroke, R AVF stenosis, aspiration and B Cereus bacteremia and RLE DVT. s/p trach and transferred to RCU 5/16      also GI bleed 5/27, full ac for RLE DVT held, hep sq for dvt ppx restarted 5/29    REVIEW OF SYSTEMS  weakness    PAST MEDICAL & SURGICAL HISTORY  Diabetes    Stage 5 chronic kidney disease not on chronic dialysis    Benign essential HTN    HLD (hyperlipidemia)    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    ESRD on hemodialysis    No significant past surgical history    AVF (arteriovenous fistula)    Arteriovenous fistula      FUNCTIONAL HISTORY  Lives with family in house with 4 steps, 1 flight inside  Independent at baseline    CURRENT FUNCTIONAL STATUS  5/30 Bed Mobility  Bed Mobility Training Rehab Potential: good, to achieve stated therapy goals  Bed Mobility Training Symptoms Noted During/After Treatment: none  Bed Mobility Training Scooting: maximum assist (25% patient effort);  supervision;  verbal cues;  1 person assist;  bed rails  Bed Mobility Training Sit-to-Supine: maximum assist (25% patient effort);  supervision;  verbal cues;  1 person assist;  bed rails  Bed Mobility Training Supine-to-Sit: maximum assist (25% patient effort);  supervision;  verbal cues;  1 person assist;  bed rails  Bed Mobility Training Limitations: decreased ability to use arms for pushing/pulling;  decreased ability to use legs for bridging/pushing;  impaired ability to control trunk for mobility;  decreased flexibility;  decreased ROM;  decreased strength;  impaired balance;  impaired postural control    Sit-Stand Transfer Training  Sit-to-Stand Transfer Training Treatment not Performed: unable to assess; poor sitting balance and right leg weakness,    Therapeutic Exercise  Therapeutic Exercise Rehab Effort: good  Therapeutic Exercise Symptoms Noted During/After Treatment: none  Therapeutic Exercise Detail: Patient participated in supine therapeutic exercises 2 x10 throughout bilateral upper and lower extremities.       FAMILY HISTORY   FHx: diabetes mellitus (Father, Aunt)      RECENT LABS/IMAGING  CBC Full  -  ( 31 May 2024 05:10 )  WBC Count : 10.08 K/uL  RBC Count : 4.06 M/uL  Hemoglobin : 9.0 g/dL  Hematocrit : 27.4 %  Platelet Count - Automated : 399 K/uL  Mean Cell Volume : 67.5 fL  Mean Cell Hemoglobin : 22.2 pg  Mean Cell Hemoglobin Concentration : 32.8 gm/dL  Auto Neutrophil # : x  Auto Lymphocyte # : x  Auto Monocyte # : x  Auto Eosinophil # : x  Auto Basophil # : x  Auto Neutrophil % : x  Auto Lymphocyte % : x  Auto Monocyte % : x  Auto Eosinophil % : x  Auto Basophil % : x    05-31    136  |  93<L>  |  62<H>  ----------------------------<  190<H>  4.1   |  23  |  6.99<H>    Ca    8.6      31 May 2024 05:10  Phos  5.1     05-31  Mg     2.40     05-31      Urinalysis Basic - ( 31 May 2024 05:10 )    Color: x / Appearance: x / SG: x / pH: x  Gluc: 190 mg/dL / Ketone: x  / Bili: x / Urobili: x   Blood: x / Protein: x / Nitrite: x   Leuk Esterase: x / RBC: x / WBC x   Sq Epi: x / Non Sq Epi: x / Bacteria: x        VITALS  T(C): 36.1 (05-31-24 @ 11:51), Max: 37.2 (05-30-24 @ 18:00)  HR: 71 (05-31-24 @ 11:51) (62 - 75)  BP: 185/64 (05-31-24 @ 11:51) (137/57 - 224/70)  RR: 20 (05-31-24 @ 11:51) (13 - 26)  SpO2: 97% (05-31-24 @ 11:51) (93% - 100%)  Wt(kg): --    ALLERGIES  No Known Allergies      MEDICATIONS   acetaminophen   Oral Liquid .. 650 milliGRAM(s) Oral every 6 hours PRN  albuterol/ipratropium for Nebulization 3 milliLiter(s) Nebulizer every 12 hours  atorvastatin 20 milliGRAM(s) Oral at bedtime  carvedilol 12.5 milliGRAM(s) Oral every 12 hours  chlorhexidine 0.12% Liquid 15 milliLiter(s) Oral Mucosa every 12 hours  chlorhexidine 2% Cloths 1 Application(s) Topical <User Schedule>  dextrose 10% Bolus 125 milliLiter(s) IV Bolus once  dextrose 5%. 1000 milliLiter(s) IV Continuous <Continuous>  dextrose 5%. 1000 milliLiter(s) IV Continuous <Continuous>  dextrose 50% Injectable 25 Gram(s) IV Push once  dextrose 50% Injectable 12.5 Gram(s) IV Push once  dextrose Oral Gel 15 Gram(s) Oral once PRN  epoetin reilly (EPOGEN) Injectable 68667 Unit(s) IV Push <User Schedule>  ferrous    sulfate Liquid 300 milliGRAM(s) Enteral Tube daily  glucagon  Injectable 1 milliGRAM(s) IntraMuscular once  heparin   Injectable 5000 Unit(s) SubCutaneous every 12 hours  hydrALAZINE 100 milliGRAM(s) Oral three times a day  insulin lispro (ADMELOG) corrective regimen sliding scale   SubCutaneous every 6 hours  insulin NPH human recombinant 4 Unit(s) SubCutaneous every 6 hours  melatonin 3 milliGRAM(s) Oral at bedtime PRN  pantoprazole  Injectable 40 milliGRAM(s) IV Push every 12 hours  sodium bicarbonate 650 milliGRAM(s) Oral every 8 hours  sodium chloride 0.9% lock flush 10 milliLiter(s) IV Push every 1 hour PRN  sodium chloride 3%  Inhalation 4 milliLiter(s) Inhalation every 12 hours      ----------------------------------------------------------------------------------------  PHYSICAL EXAM    Constitutional - NAD, in bed in chair position  HEENT - + vent  Chest -no respiratory distress  Cardiovascular - RRR, S1S2   Abdomen - BS+, Soft, NTND  Extremities - No C/C/E, No calf tenderness   Neurologic Exam -                    Cognitive - Awake, Alert      Communication - Fluent, No dysarthria     Cranial Nerves - CN 2-12 intact     Motor -                      LEFT    UE - ShAB 4/5, EF 4/5, EE 4/5, WE 4/5,  4/5                    RIGHT UE - ShAB 4/5, EF 4/5, EE 4/5, WE 4/5,  4/5                    LEFT    LE - HF 1/5, KE 2/5, DF 3/5, PF 3/5                    RIGHT LE - HF 1/5, KE 2/5, DF 3/5, PF 3/5        Sensory - Intact to LT           Balance - WNL Static  Psychiatric - Mood stable, Affect WNL  ----------------------------------------------------------------------------------------  ASSESSMENT/PLAN  73 year old male, history of ESRD on HD MWF, HTN, and IDDM2 A1C 6.9 who presented chest pain complicated by hiccups x 2 days and admitted for NSTEMI, course complicated by baclofen toxicity, GI bleed, dvt  out of bed with cristin as tolerates  continue bedside PT  please request OT  SLP    DVT PPX - heparin sq  Rehab - patient is max assist for bed mobility, will likely need vent facility per chart.  Currently recommend subacute rehab, will monitor for improvement.

## 2024-05-31 NOTE — PROGRESS NOTE ADULT - ASSESSMENT
73M PMHx HTN, ESRD, IDDM p/w hiccups and started on baclofen with concern for AMS overnight and desaturation, ?cheye nascimento respirations. Labs with numerous metabolic derangements. CTH with bifrontal encephalomalacia, likley traumatic.   WBC inc significantly, now intubated. Exam limited as on propofol, also on pressors.   s/p LP for meningitis concerns however appears bland - not on antibiotics  EEG with GPDs, no seizures  MR brain with punctuate L pontine and L cerebellar infarcts  MRA H/N with mild basilar fenestration, otherwise neg  mental status improving post extubation but now reintubated for recurrent aspiration   s/p trach, neurologically improving  o/e with R hemiparesis, mental status markedly improved    AMS of unclear etiology - ? baclofen toxicity, no evidence of seizures. 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 - embolic appearing  Intractable hiccups  hypoxic resp failure  ESRD on HD  B cereus bacteremia, likely contaminant  Nonocclusive R common iliac DVT  UGIB    MRI, MRA reviewed, as above - R hemiparesis on exam likely related to known L sided strokes - previously giving poor effort on exam   cont aspirin 81mg  TTE reviewed, no need to repeat for now  Keppra started for GPDs, no improvement in mental status - now better off keppra  continue to monitor off Keppra, avoid baclofen and reglan  s/p trach, peg   HD per nephro  f/u remainder of CSF - negative  s/p Zosyn, Meropenem for pna   Abx per ID  on hep gtt for DVT  on hold for UGIB  GI following   fistulogam per vascular, going to IR for catheter placement

## 2024-05-31 NOTE — PROGRESS NOTE ADULT - SUBJECTIVE AND OBJECTIVE BOX
Patient is a 73y old  Male who presents with a chief complaint of Unstable angina, abn EKG (31 May 2024 11:42)      SUBJECTIVE / OVERNIGHT EVENTS: awaiting permacath today. fistulogram w vascular is pending    MEDICATIONS  (STANDING):  albuterol/ipratropium for Nebulization 3 milliLiter(s) Nebulizer every 12 hours  atorvastatin 20 milliGRAM(s) Oral at bedtime  carvedilol 12.5 milliGRAM(s) Oral every 12 hours  chlorhexidine 0.12% Liquid 15 milliLiter(s) Oral Mucosa every 12 hours  chlorhexidine 2% Cloths 1 Application(s) Topical <User Schedule>  dextrose 10% Bolus 125 milliLiter(s) IV Bolus once  dextrose 5%. 1000 milliLiter(s) (50 mL/Hr) IV Continuous <Continuous>  dextrose 5%. 1000 milliLiter(s) (100 mL/Hr) IV Continuous <Continuous>  dextrose 50% Injectable 12.5 Gram(s) IV Push once  dextrose 50% Injectable 25 Gram(s) IV Push once  epoetin reilly (EPOGEN) Injectable 92702 Unit(s) IV Push <User Schedule>  ferrous    sulfate Liquid 300 milliGRAM(s) Enteral Tube daily  glucagon  Injectable 1 milliGRAM(s) IntraMuscular once  heparin   Injectable 5000 Unit(s) SubCutaneous every 12 hours  hydrALAZINE 100 milliGRAM(s) Oral three times a day  insulin lispro (ADMELOG) corrective regimen sliding scale   SubCutaneous every 6 hours  insulin NPH human recombinant 4 Unit(s) SubCutaneous every 6 hours  pantoprazole  Injectable 40 milliGRAM(s) IV Push every 12 hours  sodium bicarbonate 650 milliGRAM(s) Oral every 8 hours  sodium chloride 3%  Inhalation 4 milliLiter(s) Inhalation every 12 hours    MEDICATIONS  (PRN):  acetaminophen   Oral Liquid .. 650 milliGRAM(s) Oral every 6 hours PRN Temp greater or equal to 38C (100.4F), Moderate Pain (4 - 6)  dextrose Oral Gel 15 Gram(s) Oral once PRN Blood Glucose LESS THAN 70 milliGRAM(s)/deciliter  melatonin 3 milliGRAM(s) Oral at bedtime PRN Insomnia  sodium chloride 0.9% lock flush 10 milliLiter(s) IV Push every 1 hour PRN Pre/post blood products, medications, blood draw, and to maintain line patency      Vital Signs Last 24 Hrs  T(F): 97 (05-31-24 @ 11:51), Max: 99 (05-30-24 @ 18:00)  HR: 71 (05-31-24 @ 11:51) (62 - 75)  BP: 185/64 (05-31-24 @ 11:51) (137/57 - 224/70)  RR: 20 (05-31-24 @ 11:51) (13 - 26)  SpO2: 97% (05-31-24 @ 11:51) (93% - 100%)  Telemetry:   CAPILLARY BLOOD GLUCOSE      POCT Blood Glucose.: 224 mg/dL (31 May 2024 11:14)  POCT Blood Glucose.: 198 mg/dL (31 May 2024 05:04)  POCT Blood Glucose.: 190 mg/dL (30 May 2024 23:27)  POCT Blood Glucose.: 155 mg/dL (30 May 2024 17:00)    I&O's Summary    30 May 2024 07:01  -  31 May 2024 07:00  --------------------------------------------------------  IN: 540 mL / OUT: 0 mL / NET: 540 mL    31 May 2024 07:01  -  31 May 2024 13:12  --------------------------------------------------------  IN: 110 mL / OUT: 0 mL / NET: 110 mL        PHYSICAL EXAM:  GENERAL: NAD, well-developed  HEAD:  Atraumatic, Normocephalic  EYES: EOMI, PERRLA, conjunctiva and sclera clear  NECK: Supple, No JVD  CHEST/LUNG: Clear to auscultation bilaterally; No wheeze  HEART: Regular rate and rhythm; No murmurs, rubs, or gallops  ABDOMEN: Soft, Nontender, Nondistended; Bowel sounds present  EXTREMITIES:  2+ Peripheral Pulses, No clubbing, cyanosis, or edema  PSYCH: AAOx3  NEUROLOGY: non-focal  SKIN: No rashes or lesions    LABS:                        9.0    10.08 )-----------( 399      ( 31 May 2024 05:10 )             27.4     05-31    136  |  93<L>  |  62<H>  ----------------------------<  190<H>  4.1   |  23  |  6.99<H>    Ca    8.6      31 May 2024 05:10  Phos  5.1     05-31  Mg     2.40     05-31            Urinalysis Basic - ( 31 May 2024 05:10 )    Color: x / Appearance: x / SG: x / pH: x  Gluc: 190 mg/dL / Ketone: x  / Bili: x / Urobili: x   Blood: x / Protein: x / Nitrite: x   Leuk Esterase: x / RBC: x / WBC x   Sq Epi: x / Non Sq Epi: x / Bacteria: x        RADIOLOGY & ADDITIONAL TESTS:    Imaging Personally Reviewed:    Consultant(s) Notes Reviewed:      Care Discussed with Consultants/Other Providers:

## 2024-06-01 LAB
-  AMOXICILLIN/CLAVULANIC ACID: SIGNIFICANT CHANGE UP
-  AMPICILLIN/SULBACTAM: SIGNIFICANT CHANGE UP
-  AMPICILLIN: SIGNIFICANT CHANGE UP
-  AZTREONAM: SIGNIFICANT CHANGE UP
-  CEFAZOLIN: SIGNIFICANT CHANGE UP
-  CEFEPIME: SIGNIFICANT CHANGE UP
-  CEFOXITIN: SIGNIFICANT CHANGE UP
-  CEFTRIAXONE: SIGNIFICANT CHANGE UP
-  CIPROFLOXACIN: SIGNIFICANT CHANGE UP
-  ERTAPENEM: SIGNIFICANT CHANGE UP
-  GENTAMICIN: SIGNIFICANT CHANGE UP
-  IMIPENEM: SIGNIFICANT CHANGE UP
-  LEVOFLOXACIN: SIGNIFICANT CHANGE UP
-  MEROPENEM: SIGNIFICANT CHANGE UP
-  PIPERACILLIN/TAZOBACTAM: SIGNIFICANT CHANGE UP
-  TOBRAMYCIN: SIGNIFICANT CHANGE UP
-  TRIMETHOPRIM/SULFAMETHOXAZOLE: SIGNIFICANT CHANGE UP
ADD ON TEST-SPECIMEN IN LAB: SIGNIFICANT CHANGE UP
ANION GAP SERPL CALC-SCNC: 17 MMOL/L — HIGH (ref 7–14)
APTT BLD: 43.9 SEC — HIGH (ref 24.5–35.6)
BUN SERPL-MCNC: 35 MG/DL — HIGH (ref 7–23)
CALCIUM SERPL-MCNC: 8.9 MG/DL — SIGNIFICANT CHANGE UP (ref 8.4–10.5)
CHLORIDE SERPL-SCNC: 96 MMOL/L — LOW (ref 98–107)
CO2 SERPL-SCNC: 24 MMOL/L — SIGNIFICANT CHANGE UP (ref 22–31)
CREAT SERPL-MCNC: 4.19 MG/DL — HIGH (ref 0.5–1.3)
CULTURE RESULTS: ABNORMAL
CULTURE RESULTS: SIGNIFICANT CHANGE UP
EGFR: 14 ML/MIN/1.73M2 — LOW
GLUCOSE BLDC GLUCOMTR-MCNC: 189 MG/DL — HIGH (ref 70–99)
GLUCOSE BLDC GLUCOMTR-MCNC: 192 MG/DL — HIGH (ref 70–99)
GLUCOSE BLDC GLUCOMTR-MCNC: 235 MG/DL — HIGH (ref 70–99)
GLUCOSE BLDC GLUCOMTR-MCNC: 246 MG/DL — HIGH (ref 70–99)
GLUCOSE SERPL-MCNC: 186 MG/DL — HIGH (ref 70–99)
HCT VFR BLD CALC: 27.4 % — LOW (ref 39–50)
HGB BLD-MCNC: 8.9 G/DL — LOW (ref 13–17)
INR BLD: 1.09 RATIO — SIGNIFICANT CHANGE UP (ref 0.85–1.18)
MAGNESIUM SERPL-MCNC: 2.1 MG/DL — SIGNIFICANT CHANGE UP (ref 1.6–2.6)
MCHC RBC-ENTMCNC: 21.7 PG — LOW (ref 27–34)
MCHC RBC-ENTMCNC: 32.5 GM/DL — SIGNIFICANT CHANGE UP (ref 32–36)
MCV RBC AUTO: 66.7 FL — LOW (ref 80–100)
METHOD TYPE: SIGNIFICANT CHANGE UP
NRBC # BLD: 0 /100 WBCS — SIGNIFICANT CHANGE UP (ref 0–0)
NRBC # FLD: 0.02 K/UL — HIGH (ref 0–0)
ORGANISM # SPEC MICROSCOPIC CNT: ABNORMAL
ORGANISM # SPEC MICROSCOPIC CNT: ABNORMAL
PHOSPHATE SERPL-MCNC: 2.9 MG/DL — SIGNIFICANT CHANGE UP (ref 2.5–4.5)
PLATELET # BLD AUTO: 458 K/UL — HIGH (ref 150–400)
POTASSIUM SERPL-MCNC: 4.3 MMOL/L — SIGNIFICANT CHANGE UP (ref 3.5–5.3)
POTASSIUM SERPL-SCNC: 4.3 MMOL/L — SIGNIFICANT CHANGE UP (ref 3.5–5.3)
PROTHROM AB SERPL-ACNC: 12.3 SEC — SIGNIFICANT CHANGE UP (ref 9.5–13)
RBC # BLD: 4.11 M/UL — LOW (ref 4.2–5.8)
RBC # FLD: 23.5 % — HIGH (ref 10.3–14.5)
SODIUM SERPL-SCNC: 137 MMOL/L — SIGNIFICANT CHANGE UP (ref 135–145)
SPECIMEN SOURCE: SIGNIFICANT CHANGE UP
SPECIMEN SOURCE: SIGNIFICANT CHANGE UP
WBC # BLD: 11.03 K/UL — HIGH (ref 3.8–10.5)
WBC # FLD AUTO: 11.03 K/UL — HIGH (ref 3.8–10.5)

## 2024-06-01 PROCEDURE — 99232 SBSQ HOSP IP/OBS MODERATE 35: CPT

## 2024-06-01 RX ORDER — CARVEDILOL PHOSPHATE 80 MG/1
25 CAPSULE, EXTENDED RELEASE ORAL EVERY 12 HOURS
Refills: 0 | Status: DISCONTINUED | OUTPATIENT
Start: 2024-06-01 | End: 2024-06-14

## 2024-06-01 RX ORDER — CARVEDILOL PHOSPHATE 80 MG/1
12.5 CAPSULE, EXTENDED RELEASE ORAL ONCE
Refills: 0 | Status: COMPLETED | OUTPATIENT
Start: 2024-06-01 | End: 2024-06-01

## 2024-06-01 RX ORDER — ISOSORBIDE DINITRATE 5 MG/1
10 TABLET ORAL THREE TIMES A DAY
Refills: 0 | Status: DISCONTINUED | OUTPATIENT
Start: 2024-06-01 | End: 2024-06-01

## 2024-06-01 RX ORDER — HYDRALAZINE HCL 50 MG
10 TABLET ORAL EVERY 8 HOURS
Refills: 0 | Status: DISCONTINUED | OUTPATIENT
Start: 2024-06-01 | End: 2024-06-14

## 2024-06-01 RX ORDER — ALBUTEROL 90 UG/1
2.5 AEROSOL, METERED ORAL EVERY 6 HOURS
Refills: 0 | Status: DISCONTINUED | OUTPATIENT
Start: 2024-06-01 | End: 2024-06-14

## 2024-06-01 RX ADMIN — Medication 4: at 23:35

## 2024-06-01 RX ADMIN — HUMAN INSULIN 4 UNIT(S): 100 INJECTION, SUSPENSION SUBCUTANEOUS at 00:47

## 2024-06-01 RX ADMIN — Medication 2: at 11:34

## 2024-06-01 RX ADMIN — Medication 10 MILLIGRAM(S): at 16:32

## 2024-06-01 RX ADMIN — Medication 100 MILLIGRAM(S): at 03:24

## 2024-06-01 RX ADMIN — CHLORHEXIDINE GLUCONATE 15 MILLILITER(S): 213 SOLUTION TOPICAL at 17:24

## 2024-06-01 RX ADMIN — HUMAN INSULIN 4 UNIT(S): 100 INJECTION, SUSPENSION SUBCUTANEOUS at 23:34

## 2024-06-01 RX ADMIN — CHLORHEXIDINE GLUCONATE 15 MILLILITER(S): 213 SOLUTION TOPICAL at 05:39

## 2024-06-01 RX ADMIN — HUMAN INSULIN 4 UNIT(S): 100 INJECTION, SUSPENSION SUBCUTANEOUS at 17:28

## 2024-06-01 RX ADMIN — HUMAN INSULIN 4 UNIT(S): 100 INJECTION, SUSPENSION SUBCUTANEOUS at 11:34

## 2024-06-01 RX ADMIN — PANTOPRAZOLE SODIUM 40 MILLIGRAM(S): 20 TABLET, DELAYED RELEASE ORAL at 05:39

## 2024-06-01 RX ADMIN — ATORVASTATIN CALCIUM 20 MILLIGRAM(S): 80 TABLET, FILM COATED ORAL at 21:25

## 2024-06-01 RX ADMIN — ALBUTEROL 2.5 MILLIGRAM(S): 90 AEROSOL, METERED ORAL at 19:26

## 2024-06-01 RX ADMIN — Medication 5 MILLIGRAM(S): at 02:01

## 2024-06-01 RX ADMIN — ALBUTEROL 2.5 MILLIGRAM(S): 90 AEROSOL, METERED ORAL at 15:34

## 2024-06-01 RX ADMIN — CHLORHEXIDINE GLUCONATE 1 APPLICATION(S): 213 SOLUTION TOPICAL at 05:39

## 2024-06-01 RX ADMIN — CARVEDILOL PHOSPHATE 25 MILLIGRAM(S): 80 CAPSULE, EXTENDED RELEASE ORAL at 17:33

## 2024-06-01 RX ADMIN — CARVEDILOL PHOSPHATE 12.5 MILLIGRAM(S): 80 CAPSULE, EXTENDED RELEASE ORAL at 11:03

## 2024-06-01 RX ADMIN — HEPARIN SODIUM 5000 UNIT(S): 5000 INJECTION INTRAVENOUS; SUBCUTANEOUS at 05:38

## 2024-06-01 RX ADMIN — PANTOPRAZOLE SODIUM 40 MILLIGRAM(S): 20 TABLET, DELAYED RELEASE ORAL at 17:24

## 2024-06-01 RX ADMIN — CARVEDILOL PHOSPHATE 12.5 MILLIGRAM(S): 80 CAPSULE, EXTENDED RELEASE ORAL at 08:10

## 2024-06-01 RX ADMIN — Medication 650 MILLIGRAM(S): at 05:38

## 2024-06-01 RX ADMIN — Medication 4: at 17:27

## 2024-06-01 RX ADMIN — Medication 2: at 00:43

## 2024-06-01 RX ADMIN — Medication 300 MILLIGRAM(S): at 11:02

## 2024-06-01 RX ADMIN — Medication 2: at 06:53

## 2024-06-01 RX ADMIN — HEPARIN SODIUM 5000 UNIT(S): 5000 INJECTION INTRAVENOUS; SUBCUTANEOUS at 17:26

## 2024-06-01 RX ADMIN — HUMAN INSULIN 4 UNIT(S): 100 INJECTION, SUSPENSION SUBCUTANEOUS at 06:52

## 2024-06-01 RX ADMIN — AMLODIPINE BESYLATE 10 MILLIGRAM(S): 2.5 TABLET ORAL at 03:39

## 2024-06-01 RX ADMIN — Medication 10 MILLIGRAM(S): at 23:40

## 2024-06-01 RX ADMIN — Medication 100 MILLIGRAM(S): at 11:03

## 2024-06-01 RX ADMIN — Medication 100 MILLIGRAM(S): at 20:19

## 2024-06-01 RX ADMIN — Medication 650 MILLIGRAM(S): at 21:25

## 2024-06-01 RX ADMIN — Medication 650 MILLIGRAM(S): at 15:05

## 2024-06-01 NOTE — CHART NOTE - NSCHARTNOTEFT_GEN_A_CORE
RCU PA NOTE     Called by RN for persistently elevated BPs. Coreg and Norvasc increased, however BP remains elevated. Will start Isordil 10 TID and monitor BPs.     JANET Mercedes, PA-C  Department of Medicine/ RCU  In house RCU [place] [id number]  In house Medicine [place] [phone]  Reachable via teams RCU PA NOTE     Called by RN for persistently elevated BPs. Coreg and Norvasc increased, however BP remains elevated. Will increase Hydral pushes to 10mg and monitor BPs. IF BP remains elevated will start nitrates per discussion with Cardiology.     JANET Mercedes, PA-C  Department of Medicine/ RCU  In house RCU [place] [id number]  In house Medicine [facility] [phone]  Reachable via teams

## 2024-06-01 NOTE — CHART NOTE - NSCHARTNOTEFT_GEN_A_CORE
RCU PA NOTE     Called by RN for patient with soaked shiley dressing. Seen by bedside and dressing taken down with clot at shiley insertion site. No active bleeding noted. Shiley redressed with guaze, Surgicel and central line dressing. Will monitor bleeding and if remains stable will resume heparin GTT in AM     JANET Mercedes, PA-C  Department of Medicine/ RCU  In house RCU [facility] [id number]  In house Medicine Bee[facility] [id number]  Reachable via teams

## 2024-06-01 NOTE — PROGRESS NOTE ADULT - SUBJECTIVE AND OBJECTIVE BOX
Patient is a 73y old  Male who presents with a chief complaint of Unstable angina, abn EKG (01 Jun 2024 10:37)      SUBJECTIVE / OVERNIGHT EVENTS: on trache, vented, alert awake.     MEDICATIONS  (STANDING):  albuterol    0.083% 2.5 milliGRAM(s) Nebulizer every 6 hours  amLODIPine   Tablet 10 milliGRAM(s) Oral daily  atorvastatin 20 milliGRAM(s) Oral at bedtime  carvedilol 25 milliGRAM(s) Oral every 12 hours  chlorhexidine 0.12% Liquid 15 milliLiter(s) Oral Mucosa every 12 hours  chlorhexidine 2% Cloths 1 Application(s) Topical <User Schedule>  dextrose 10% Bolus 125 milliLiter(s) IV Bolus once  dextrose 5%. 1000 milliLiter(s) (100 mL/Hr) IV Continuous <Continuous>  dextrose 5%. 1000 milliLiter(s) (50 mL/Hr) IV Continuous <Continuous>  dextrose 50% Injectable 25 Gram(s) IV Push once  dextrose 50% Injectable 12.5 Gram(s) IV Push once  epoetin reilly (EPOGEN) Injectable 12253 Unit(s) IV Push <User Schedule>  ferrous    sulfate Liquid 300 milliGRAM(s) Enteral Tube daily  glucagon  Injectable 1 milliGRAM(s) IntraMuscular once  heparin   Injectable 5000 Unit(s) SubCutaneous every 12 hours  hydrALAZINE 100 milliGRAM(s) Oral three times a day  insulin lispro (ADMELOG) corrective regimen sliding scale   SubCutaneous every 6 hours  insulin NPH human recombinant 4 Unit(s) SubCutaneous every 6 hours  pantoprazole  Injectable 40 milliGRAM(s) IV Push every 12 hours  sodium bicarbonate 650 milliGRAM(s) Oral every 8 hours    MEDICATIONS  (PRN):  acetaminophen   Oral Liquid .. 650 milliGRAM(s) Oral every 6 hours PRN Temp greater or equal to 38C (100.4F), Moderate Pain (4 - 6)  dextrose Oral Gel 15 Gram(s) Oral once PRN Blood Glucose LESS THAN 70 milliGRAM(s)/deciliter  hydrALAZINE Injectable 10 milliGRAM(s) IV Push every 8 hours PRN SBP > 180  melatonin 3 milliGRAM(s) Oral at bedtime PRN Insomnia  sodium chloride 0.9% lock flush 10 milliLiter(s) IV Push every 1 hour PRN Pre/post blood products, medications, blood draw, and to maintain line patency      Vital Signs Last 24 Hrs  T(F): 97.7 (06-01-24 @ 08:00), Max: 97.9 (05-31-24 @ 20:00)  HR: 72 (06-01-24 @ 15:36) (67 - 80)  BP: 155/67 (06-01-24 @ 12:12) (155/67 - 196/67)  RR: 16 (06-01-24 @ 08:30) (16 - 20)  SpO2: 95% (06-01-24 @ 08:35) (94% - 100%)  Telemetry:   CAPILLARY BLOOD GLUCOSE      POCT Blood Glucose.: 189 mg/dL (01 Jun 2024 11:31)  POCT Blood Glucose.: 192 mg/dL (01 Jun 2024 06:23)  POCT Blood Glucose.: 169 mg/dL (31 May 2024 23:56)  POCT Blood Glucose.: 216 mg/dL (31 May 2024 17:24)    I&O's Summary    31 May 2024 07:01  -  01 Jun 2024 07:00  --------------------------------------------------------  IN: 1600 mL / OUT: 2400 mL / NET: -800 mL    01 Jun 2024 07:01  -  01 Jun 2024 15:47  --------------------------------------------------------  IN: 220 mL / OUT: 0 mL / NET: 220 mL        PHYSICAL EXAM:  GENERAL: NAD, well-developed  HEAD:  Atraumatic, Normocephalic  EYES: EOMI, PERRLA, conjunctiva and sclera clear  NECK: Supple, No JVD  CHEST/LUNG: Clear to auscultation bilaterally; No wheeze  HEART: Regular rate and rhythm; No murmurs, rubs, or gallops  ABDOMEN: Soft, Nontender, Nondistended; Bowel sounds present  EXTREMITIES:  2+ Peripheral Pulses, No clubbing, cyanosis, or edema  PSYCH: AAOx3  NEUROLOGY: non-focal  SKIN: No rashes or lesions    LABS:                        8.9    11.03 )-----------( 458      ( 01 Jun 2024 06:00 )             27.4     06-01    137  |  96<L>  |  35<H>  ----------------------------<  186<H>  4.3   |  24  |  4.19<H>    Ca    8.9      01 Jun 2024 06:00  Phos  2.9     06-01  Mg     2.10     06-01      PT/INR - ( 01 Jun 2024 09:25 )   PT: 12.3 sec;   INR: 1.09 ratio         PTT - ( 01 Jun 2024 09:25 )  PTT:43.9 sec      Urinalysis Basic - ( 01 Jun 2024 06:00 )    Color: x / Appearance: x / SG: x / pH: x  Gluc: 186 mg/dL / Ketone: x  / Bili: x / Urobili: x   Blood: x / Protein: x / Nitrite: x   Leuk Esterase: x / RBC: x / WBC x   Sq Epi: x / Non Sq Epi: x / Bacteria: x        RADIOLOGY & ADDITIONAL TESTS:    Imaging Personally Reviewed:    Consultant(s) Notes Reviewed:      Care Discussed with Consultants/Other Providers:

## 2024-06-01 NOTE — CHART NOTE - NSCHARTNOTEFT_GEN_A_CORE
Notified by RN that patient's shiley site is oozing. Patient seen and assessed at bedside, dressing removed by pooling of blood. Hemostasis maintained and site redressed by 2x2 and Tegaderm. Shiley site now stable. Patient resting in bed comfortably. VSS.     Will monitor patient closely and have AM team reassess.     Ashlie Sharpe PA-C  Department of Medicine [id number]

## 2024-06-01 NOTE — PROGRESS NOTE ADULT - ASSESSMENT
73-year-old male past medical history hypertension, ESRD on dialysis Monday Wednesday Friday, diabetes insulin-dependent presents with hiccups patient endorses persistent hiccups for the last 2 days.   found to have peaked T waves, a new finding. denied dizziness, N/V, denies CP, palps, abd pain  Upon admission seen by CArd, renal and GI  found to have a distended GB prob 2/2 gastroparesis, was started on Reglan  has received 4 doses of baclofen since 4/30 2/2 hiccups, last dose on 5/1 at 5 am  had received Haldol for agitation at 11 pm on 4/30, AMS observed in pm of 5/1  AMS ongoing, RRT x 2 called  now transferred to MICU for encephalopathy requiring  airway protection on 5/2,  intubated for airway protection, was on  propofol and pressors. now off  toxicology consulted upon dx of encephalopathy, not impressed AMS 2/2 Haldol nor baclofen . AMS was 2/2 acute CVA   HD was not done timely until femoral shiley was placed 2/2 clotted RUE AVF. now removed and RIJ shiley placed on 5/3  has been nonverbal since pm 5/1.     MR brain 5/6 c/w L pontine and L cerebellar acute infarcts, no hemorrhage . as per neuro: embolic in nature.   encephalopathy probably 2/2 acute CVA, now follows commands appropriately off sedation.   no SZ focus on EEG,   off CRRT,  HD resumed as per renal.   remains intubated, now trached  off keppra  AC resumed, was initially on  Heparin drip for R common iliac DVT, now held 2/2 GI bleed  completed 5 days of empiric ZOSYN on 5/7, shortly after became septic again after an extubation trial. bacteremia w B. cereus, on Vanc through 5/20 as per ID    s/p trache placement by pulm,   IR unable to find a window for G-J tube. PEG placed by surgery 5/17, resume feeds when cleared by surgery  HD via R IJ.  Tmax overnight( 5/18)  101, cxr c/w RLL PNA, now on Zosyn, blood Cx s sent. remains on Vanco for B. cereus bacteremia   leukocytosis resolved  EKG changes on 5/3 c/w NSTEMI loaded w DAPT , was  on Heparin drip x 48 hrs. rpt TTE is unchanged  ID, Neuro , renal, cardiology following.  clotted RUE AVF.  fistulogram was cancelled on 5/25 2/2 fever  [facility]  LP done, no sign of meningitis.   NEUROLOGY     - CTH without acute findings   - LP done, no acute findings  - MR brain w acute infarcts: L talya and L cerebellum, nonhemorrhagic  - no Sz focus on EEG    CARDIOVASCULAR   - ptn never had CP. just peaked T waves. was awaiting ischemic study w nucl stress test  - TTE showing EF 72%, rpt unchanged  - EKG changes on 5/3, loaded w DAPT    GI  - PEG placed, npo w tube feeds  - Gastroparesis       RENAL   -  HD as per renal  - shiley replaced 6/1  - fistulogram planning next week      INFECTIOUS DISEASE     completed a course of Zosyn, then became septic, bacteremic a B. cereus, completed 3 days of Meropenem, completed vanc through 5/21  on Zosyn since 5/18 for RLL PNA, afebrile, completed 5/23  recurrent fever 5/25, ZOsyn resumed and stopped on 5/28. afebrile since    ENDOCRINE   - DM  - cw ISS    DVT  - RLE , initially on Heparin drip, now off 2/2 GI bleed. does he need an IVC filter    GOC:  Full code

## 2024-06-01 NOTE — PROGRESS NOTE ADULT - ASSESSMENT
PRN Medication Documentation    Specific patient behavior that led to need for PRN medication: Chronic chest pain  Staff interventions attempted prior to PRN being given: Relaxation.  Laying down  PRN medication given: Norco  mg tab given at [phone]  Patient response/effectiveness of PRN medication: TBD    1458 - Symptom relief conservative gi magnemnt  no acute gi complaints  continue current regimen  will continue to follow  ppi thapry

## 2024-06-01 NOTE — PROGRESS NOTE ADULT - SUBJECTIVE AND OBJECTIVE BOX
CHIEF COMPLAINT: Patient is a 73y old  Male who presents with a chief complaint of Unstable angina, abn EKG (31 May 2024 15:14)      INTERVAL EVENTS:  - s/p R IJ Shiley and HD session overnight c/b slightly oozing from shiley site, however self resolved.     REVIEW OF SYSTEMS: Seen by bedside during AM rounds and denies SOB    Mode: standby      OBJECTIVE:  ICU Vital Signs Last 24 Hrs  T(C): 36.5 (01 Jun 2024 08:00), Max: 36.6 (31 May 2024 20:00)  T(F): 97.7 (01 Jun 2024 08:00), Max: 97.9 (31 May 2024 20:00)  HR: 79 (01 Jun 2024 08:00) (67 - 79)  BP: 172/74 (01 Jun 2024 08:00) (168/68 - 196/67)  BP(mean): 88 (31 May 2024 18:11) (88 - 88)  ABP: --  ABP(mean): --  RR: 18 (01 Jun 2024 08:00) (16 - 20)  SpO2: 98% (01 Jun 2024 08:00) (94% - 100%)    O2 Parameters below as of 01 Jun 2024 08:00  Patient On (Oxygen Delivery Method): tracheostomy collar  O2 Flow (L/min): 6  O2 Concentration (%): 28      Mode: standby    05-31 @ 07:01  -  06-01 @ 07:00  --------------------------------------------------------  IN: 1600 mL / OUT: 2400 mL / NET: -800 mL      CAPILLARY BLOOD GLUCOSE  POCT Blood Glucose.: 192 mg/dL (01 Jun 2024 06:23)      HOSPITAL MEDICATIONS:  MEDICATIONS  (STANDING):  albuterol/ipratropium for Nebulization 3 milliLiter(s) Nebulizer every 12 hours  amLODIPine   Tablet 10 milliGRAM(s) Oral daily  atorvastatin 20 milliGRAM(s) Oral at bedtime  carvedilol 12.5 milliGRAM(s) Oral every 12 hours  chlorhexidine 0.12% Liquid 15 milliLiter(s) Oral Mucosa every 12 hours  chlorhexidine 2% Cloths 1 Application(s) Topical <User Schedule>  dextrose 10% Bolus 125 milliLiter(s) IV Bolus once  dextrose 5%. 1000 milliLiter(s) (100 mL/Hr) IV Continuous <Continuous>  dextrose 5%. 1000 milliLiter(s) (50 mL/Hr) IV Continuous <Continuous>  dextrose 50% Injectable 25 Gram(s) IV Push once  dextrose 50% Injectable 12.5 Gram(s) IV Push once  epoetin reilly (EPOGEN) Injectable 55531 Unit(s) IV Push <User Schedule>  ferrous    sulfate Liquid 300 milliGRAM(s) Enteral Tube daily  glucagon  Injectable 1 milliGRAM(s) IntraMuscular once  heparin   Injectable 5000 Unit(s) SubCutaneous every 12 hours  hydrALAZINE 100 milliGRAM(s) Oral three times a day  insulin lispro (ADMELOG) corrective regimen sliding scale   SubCutaneous every 6 hours  insulin NPH human recombinant 4 Unit(s) SubCutaneous every 6 hours  pantoprazole  Injectable 40 milliGRAM(s) IV Push every 12 hours  sodium bicarbonate 650 milliGRAM(s) Oral every 8 hours  sodium chloride 3%  Inhalation 4 milliLiter(s) Inhalation every 12 hours    MEDICATIONS  (PRN):  acetaminophen   Oral Liquid .. 650 milliGRAM(s) Oral every 6 hours PRN Temp greater or equal to 38C (100.4F), Moderate Pain (4 - 6)  dextrose Oral Gel 15 Gram(s) Oral once PRN Blood Glucose LESS THAN 70 milliGRAM(s)/deciliter  hydrALAZINE Injectable 5 milliGRAM(s) IV Push every 8 hours PRN SBP > 180  melatonin 3 milliGRAM(s) Oral at bedtime PRN Insomnia  sodium chloride 0.9% lock flush 10 milliLiter(s) IV Push every 1 hour PRN Pre/post blood products, medications, blood draw, and to maintain line patency      PHYSICAL EXAMINATION  General: NAD   HEENT: Trach present and collar tolerated well. R IJ shiley with old dry blood.   Cards: S1/S2, no murmurs   Pulm: CTA bilaterally. No wheezes.   Abdomen: Soft, nondistended and nontender. BS (+) PEG present.   Extremities: No pedal edema. THAI of BL upper and lower extremities with improving strength.  Neurology: AOx3 with no acute focal neurological deficits     LABS:                        8.9    11.03 )-----------( 458      ( 01 Jun 2024 06:00 )             27.4     06-01    137  |  96<L>  |  35<H>  ----------------------------<  186<H>  4.3   |  24  |  4.19<H>    Ca    8.9      01 Jun 2024 06:00  Phos  2.9     06-01  Mg     2.10     06-01        Urinalysis Basic - ( 01 Jun 2024 06:00 )  Color: x / Appearance: x / SG: x / pH: x  Gluc: 186 mg/dL / Ketone: x  / Bili: x / Urobili: x   Blood: x / Protein: x / Nitrite: x   Leuk Esterase: x / RBC: x / WBC x   Sq Epi: x / Non Sq Epi: x / Bacteria: x        Venous Blood Gas:  05-31 @ 17:15  7.45/39/56/27/87.0  VBG Lactate: 1.5      PAST MEDICAL & SURGICAL HISTORY:  Diabetes      Benign essential HTN      HLD (hyperlipidemia)      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 CHIEF COMPLAINT: Patient is a 73y old  Male who presents with a chief complaint of Unstable angina, abn EKG (31 May 2024 15:14)      INTERVAL EVENTS:  - s/p R IJ Shiley and HD session overnight c/b slightly oozing from shiley site, however self resolved.   - Citrobacter sensitive to zosyn and completed on 5/28. Monitor off ABX.   - Failed SS   - Heparin GTT tonight vs tomorrow if shiley oozing stops    REVIEW OF SYSTEMS: Seen by bedside during AM rounds and denies SOB    Mode: standby      OBJECTIVE:  ICU Vital Signs Last 24 Hrs  T(C): 36.5 (01 Jun 2024 08:00), Max: 36.6 (31 May 2024 20:00)  T(F): 97.7 (01 Jun 2024 08:00), Max: 97.9 (31 May 2024 20:00)  HR: 79 (01 Jun 2024 08:00) (67 - 79)  BP: 172/74 (01 Jun 2024 08:00) (168/68 - 196/67)  BP(mean): 88 (31 May 2024 18:11) (88 - 88)  ABP: --  ABP(mean): --  RR: 18 (01 Jun 2024 08:00) (16 - 20)  SpO2: 98% (01 Jun 2024 08:00) (94% - 100%)    O2 Parameters below as of 01 Jun 2024 08:00  Patient On (Oxygen Delivery Method): tracheostomy collar  O2 Flow (L/min): 6  O2 Concentration (%): 28      Mode: standby    05-31 @ 07:01  -  06-01 @ 07:00  --------------------------------------------------------  IN: 1600 mL / OUT: 2400 mL / NET: -800 mL      CAPILLARY BLOOD GLUCOSE  POCT Blood Glucose.: 192 mg/dL (01 Jun 2024 06:23)      HOSPITAL MEDICATIONS:  MEDICATIONS  (STANDING):  albuterol/ipratropium for Nebulization 3 milliLiter(s) Nebulizer every 12 hours  amLODIPine   Tablet 10 milliGRAM(s) Oral daily  atorvastatin 20 milliGRAM(s) Oral at bedtime  carvedilol 12.5 milliGRAM(s) Oral every 12 hours  chlorhexidine 0.12% Liquid 15 milliLiter(s) Oral Mucosa every 12 hours  chlorhexidine 2% Cloths 1 Application(s) Topical <User Schedule>  dextrose 10% Bolus 125 milliLiter(s) IV Bolus once  dextrose 5%. 1000 milliLiter(s) (100 mL/Hr) IV Continuous <Continuous>  dextrose 5%. 1000 milliLiter(s) (50 mL/Hr) IV Continuous <Continuous>  dextrose 50% Injectable 25 Gram(s) IV Push once  dextrose 50% Injectable 12.5 Gram(s) IV Push once  epoetin reilly (EPOGEN) Injectable 91140 Unit(s) IV Push <User Schedule>  ferrous    sulfate Liquid 300 milliGRAM(s) Enteral Tube daily  glucagon  Injectable 1 milliGRAM(s) IntraMuscular once  heparin   Injectable 5000 Unit(s) SubCutaneous every 12 hours  hydrALAZINE 100 milliGRAM(s) Oral three times a day  insulin lispro (ADMELOG) corrective regimen sliding scale   SubCutaneous every 6 hours  insulin NPH human recombinant 4 Unit(s) SubCutaneous every 6 hours  pantoprazole  Injectable 40 milliGRAM(s) IV Push every 12 hours  sodium bicarbonate 650 milliGRAM(s) Oral every 8 hours  sodium chloride 3%  Inhalation 4 milliLiter(s) Inhalation every 12 hours    MEDICATIONS  (PRN):  acetaminophen   Oral Liquid .. 650 milliGRAM(s) Oral every 6 hours PRN Temp greater or equal to 38C (100.4F), Moderate Pain (4 - 6)  dextrose Oral Gel 15 Gram(s) Oral once PRN Blood Glucose LESS THAN 70 milliGRAM(s)/deciliter  hydrALAZINE Injectable 5 milliGRAM(s) IV Push every 8 hours PRN SBP > 180  melatonin 3 milliGRAM(s) Oral at bedtime PRN Insomnia  sodium chloride 0.9% lock flush 10 milliLiter(s) IV Push every 1 hour PRN Pre/post blood products, medications, blood draw, and to maintain line patency      PHYSICAL EXAMINATION  General: NAD   HEENT: Trach present and collar tolerated well. R IJ shiley with old dry blood.   Cards: S1/S2, no murmurs   Pulm: CTA bilaterally. No wheezes.   Abdomen: Soft, nondistended and nontender. BS (+) PEG present.   Extremities: No pedal edema. THAI of BL upper and lower extremities with improving strength.  Neurology: AOx3 with no acute focal neurological deficits     LABS:                        8.9    11.03 )-----------( 458      ( 01 Jun 2024 06:00 )             27.4     06-01    137  |  96<L>  |  35<H>  ----------------------------<  186<H>  4.3   |  24  |  4.19<H>    Ca    8.9      01 Jun 2024 06:00  Phos  2.9     06-01  Mg     2.10     06-01        Urinalysis Basic - ( 01 Jun 2024 06:00 )  Color: x / Appearance: x / SG: x / pH: x  Gluc: 186 mg/dL / Ketone: x  / Bili: x / Urobili: x   Blood: x / Protein: x / Nitrite: x   Leuk Esterase: x / RBC: x / WBC x   Sq Epi: x / Non Sq Epi: x / Bacteria: x        Venous Blood Gas:  05-31 @ 17:15  7.45/39/56/27/87.0  VBG Lactate: 1.5      PAST MEDICAL & SURGICAL HISTORY:  Diabetes      Benign essential HTN      HLD (hyperlipidemia)      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

## 2024-06-01 NOTE — PROGRESS NOTE ADULT - ASSESSMENT
73M w/ PMHx hypertension, ESRD on HD via R radiocephalic fistula (MWF), DM, HTN, p/w hiccups and peaked T waves, admitted to MICU for c/f baclofen toxicity. Patient received 1x HD on admission. R femoral shiley removed 5/2, had 2 RRT for AMS and failed HD via AVF 5/3. S/p emergent R IJ shiley placement 5/3, started CRRT which is now transitioned back to iHD. Vascular planning RUE fistulogram when medically optimized. Extubated to HFNC then reintubated 5/12 for c/f aspiration w/ hypoxic resp failure. S/p trach 5/14. Downgraded from MICU to RCU 5/16.    Recommendations:   - Patient afebrile for 48hrs, BCx NGTD x24hrs, sputum cx 5/30 w/ moderate citrobacter koseri  - Tentatively planning for RUE fistulogram and thrombectomy next week w/ Dr. Lockett  - S/p R IJ shiley placement w/ IR 5/31  - Appreciate completed vein mapping - no adequate veins in UE for conduit (all <2 mm and/or thrombosed) for possible revision of RUE AVF  - Needs preop optimization documented by pulm, cards optimization documented  - Hep gtt for nonocclusive R common iliac DVT on hold given recent c/f UGIB - resolved  - Rest of care per primary    Vascular Surgery  [id number]

## 2024-06-01 NOTE — PROGRESS NOTE ADULT - SUBJECTIVE AND OBJECTIVE BOX
Cardiovascular Disease Progress Note  DATE OF SERVICE: 24 @ 09:02    Overnight events: No acute events overnight.    The patient is in no distress on trach collar.   Otherwise review of systems negative    Objective Findings:  T(C): 36.5 (24 @ 08:00), Max: 36.6 (24 @ 20:00)  HR: 75 (24 @ 08:35) (67 - 79)  BP: 172/74 (24 @ 08:00) (168/68 - 196/67)  RR: 16 (24 @ 08:30) (16 - 20)  SpO2: 95% (24 @ 08:35) (94% - 100%)  Wt(kg): --  Daily     Daily Weight in k.1 (31 May 2024 22:45)      Physical Exam:  Gen: NAD; Patient resting comfortably  HEENT:  Normocephalic/ atraumatic  CV: RRR, normal S1 + S2, no m/r/g  Lungs:  Normal respiratory effort; clear to auscultation bilaterally  Abd: soft, non-tender; bowel sounds present  Ext:   warm and well perfused    Telemetry: n/a    Laboratory Data:                        8.9    11.03 )-----------( 458      ( 2024 06:00 )             27.4     06    137  |  96<L>  |  35<H>  ----------------------------<  186<H>  4.3   |  24  |  4.19<H>    Ca    8.9      2024 06:00  Phos  2.9     06  Mg     2.10     06-                Inpatient Medications:  MEDICATIONS  (STANDING):  albuterol/ipratropium for Nebulization 3 milliLiter(s) Nebulizer every 12 hours  amLODIPine   Tablet 10 milliGRAM(s) Oral daily  atorvastatin 20 milliGRAM(s) Oral at bedtime  carvedilol 12.5 milliGRAM(s) Oral every 12 hours  chlorhexidine 0.12% Liquid 15 milliLiter(s) Oral Mucosa every 12 hours  chlorhexidine 2% Cloths 1 Application(s) Topical <User Schedule>  dextrose 10% Bolus 125 milliLiter(s) IV Bolus once  dextrose 5%. 1000 milliLiter(s) (100 mL/Hr) IV Continuous <Continuous>  dextrose 5%. 1000 milliLiter(s) (50 mL/Hr) IV Continuous <Continuous>  dextrose 50% Injectable 25 Gram(s) IV Push once  dextrose 50% Injectable 12.5 Gram(s) IV Push once  epoetin reilly (EPOGEN) Injectable 14731 Unit(s) IV Push <User Schedule>  ferrous    sulfate Liquid 300 milliGRAM(s) Enteral Tube daily  glucagon  Injectable 1 milliGRAM(s) IntraMuscular once  heparin   Injectable 5000 Unit(s) SubCutaneous every 12 hours  hydrALAZINE 100 milliGRAM(s) Oral three times a day  insulin lispro (ADMELOG) corrective regimen sliding scale   SubCutaneous every 6 hours  insulin NPH human recombinant 4 Unit(s) SubCutaneous every 6 hours  pantoprazole  Injectable 40 milliGRAM(s) IV Push every 12 hours  sodium bicarbonate 650 milliGRAM(s) Oral every 8 hours  sodium chloride 3%  Inhalation 4 milliLiter(s) Inhalation every 12 hours      Assessment:  73 year old man with HTN, HLD, T2DM on insulin, and ESRD on HD presents with supply demand ischemia and angina.    Plan of Care:    #Type II myocardial infarction-  Secondary to distributive shock earlier on admission.   The repeat echo shows no new wall motion abnormality.   I would not pursue cath at this time given recurrent aspiration and chronic respiratory failure s/p trach .   The patient is optimized from a cardiac standpoint to proceed with vascular HD access intervention.   - Continue Coreg throughout the jin-operative period.     #HTN-  Consider nitrates if BP is persistently elevated.           #ESRD-  HD as per renal     #DVT   - R common Iliac DVT  Oozing noted at HD site.   Management as per RCU.                  Over 55 minutes spent on total encounter; more than 50% of the visit was spent counseling and/or coordinating care by the attending physician.      Geovani Bravo MD [facility]  Cardiovascular Disease  [phone]

## 2024-06-01 NOTE — PROGRESS NOTE ADULT - ASSESSMENT
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 Baclofen started and course complicated by AMS second to baclofen toxicity requiring intubation and MICU transfer. Course further complicated by LEFT pontine and cerebellar stroke, R AVF stenosis, aspiration and B Cereus bacteremia and RLE DVT. s/p trach and transferred to RCU 5/16 and course complicated by intermittent fever spikes with likely aspiration citrobacter PNA, AOCD, and GIB vs Fe stools.     NEUROLOGY  # AMS   - MRI HEAD (5/6) with punctate foci in the left talya and left cerebellum with concern for acute infarct.   - MRA HEAD and NECK (5/6) with no large vessel occlusion or stenosis.  - Continue on aspirin and hold DAPT for now pending procedures   - Continue on Lipitor for medical management     PSYCH   # Depression   - Concern for depression with questionable SI   - CO started, however formal discussion and evaluation with no true SI   - Supportive care and encouragement QD    CARDIOVASCULAR  # CP with NSTEMI < demand ischemia with HTN   - Admitted for CP and HTN with peaked T WAVES and ECG with ST deviation on admission   - Troponins elevated on admission with negative CKMB   - TTE (4/30) with EF 72, normal LVRVSF, and no regional wall motion abnormalities   - Case discussed with cardiology and no acute need for cath.   - DAPT loaded and continue on medical management with ASA and lipitor     # Septic vs vasoplegic shock  # Hx of HTN   - Home BP medications held and pressors weaned off   - Continue on coreg 12.5, hydralazine 100 TID and norvasc 10mg (increased 5/31)   - Monitor blood pressures with hydral 5mg IVP Q8H PRN   - Adjust BP regimen as needed     RESPIRATORY  # Respiratory failure   - AMS noted with baclofen toxicity requiring intubation   - Attempted extubation in MICU however course complicated by aspiration and prolonged course and now s/p tracheostomy with IP on 5/14  - Continue on vent 12/500/5/30 and weaned to TC ATC   - Continue on nebs and HTS Q12H for now   - Continue on TC ATC (5/30 - )     GI  # Dysphagia   - s/p surgical PEG 5/17   - Continue on tube feeds via PEG   - Attempt SS as able to tolerate TC ATC    # GIB  - Noted with several episodes of black stool with drop in Hgb (5/26 overnight) requiring PRBC with appropriate response  - Case discussed with GI, questionable anemia from AOCD with ESRD and black stool likely from iron, and no plan for scope at this time  - Holding heparin GTT as below and continue on PPI  - Monitor stools     RENAL  # ESRD on HD MWF with R BCF  # Fistula stenosis   - VA AVF (5/2) with Right radiocephalic arteriovenous fistula has no hemodynamically significant stenosis present at the anastomotic site ( cm/sec, EDV 49 cm/sec). The usable segment is partially thrombosed with minimal patency.  - Required R FEMORAL line on medicine, then removed on 5/2, and replaced with R IJ SHILEY on 5/3 for CRRT then converted back to iHD MWF   - R IJ SHILEY removed on 5/10 second to bacteremia and replaced on 5/13  - R IJ SHILEY removed 5/27 given intermittent fevers    - Blood cultures negative and plan to replace SHILEY TODAY   - Vein mapping with no adequate veins in UE for conduit (all <2 mm and/or thrombosed) and pending possible revision of RUE AVF.   Plan for fistulogram and fistula repair next week when BCx and SCx final   - Continue on HD MWF per Renal   - Continue on HCO3 650 tabs     # Hyperphosphatemia   - Continue on Renvela 1600 however phos corrects well with HD   - Monitor off Renvela     INFECTIOUS DISEASE  # Aspiration concern   - Recurrent fever spike and CXR with RLL opacity   - BCx (5/25 and 5/30) negative   - SCx (5/25) negative   - SCx (5/30) with citrobacter   - s/p Zosyn empirically (5/18 - 5/28) with intermittent fever spikes. Per discussion with ID will hold further ABX pending SCx sensitivities. Zosyn should cover for zosyn and currently with improvement in respiratory status.     # B Cereus Bacteremia   - Course complicated by fevers and aspiration event   - MRSA (5/1) negative   - BCx (5/2) negative   - SCx (5/4) and BAL (5/12) negative   - BCx (5/10) with bacillus cereus and cleared on (5/12).   - Case discussed with ID and s/p Vancomycin through 5/21 x 10 day course.   - Monitor off Abx for now    HEME  # AOCD with ESRD   - Anemia panel with AOCD and low % sat   - EPO 10K continued on TIW   - Ferrous supplement added   - - Transfused on 5/16 and 5/26 and will monitor HH     VASCULAR   # RLE DVT   - CT AP (5/12) with nonocclusive RIGHT common iliac vein deep venous thrombosis and case discussed with IR with no plans for IVC filter.   - Initially started on a Heparin GTT with course c/b questionable GIB given dark tarry stools, however more likely second to iron tabs.   - Challenge on HSQ with stable HH.   - Resume Heparin  GTT post SHILEY .     ENDOCRINE  # IDDM2 A1C 6.9  - c/w NPH 4U Q6 and ISS  - Monitor and adjust insulin based on FS     SKIN  - R FEMORAL (5/1-5/2)   - R IJ SHILEY (5/3-5/10)   - R IJ SHILEY (5/13 - 5/27)     ETHICS/ GOC    - FULL CODE     DISPO - PMR recc KIMI, however if strength improves while in house then plan for RPT PMR evaluation for acute rehab.    74 YO M with PMHx of ESRD on HD MWF, HTN, and 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 Baclofen started and course complicated by AMS second to baclofen toxicity requiring intubation and MICU transfer. Course further complicated by LEFT pontine and cerebellar stroke, R AVF stenosis, aspiration and B Cereus bacteremia and RLE DVT. s/p trach and transferred to RCU 5/16 and course complicated by intermittent fever spikes with likely aspiration citrobacter PNA, AOCD, and GIB vs Fe stools.     NEUROLOGY  # AMS   - MRI HEAD (5/6) with punctate foci in the left talya and left cerebellum with concern for acute infarct.   - MRA HEAD and NECK (5/6) with no large vessel occlusion or stenosis.  - Continue on aspirin and hold DAPT for now pending procedures   - Continue on Lipitor for medical management     PSYCH   # Depression   - Concern for depression with questionable SI   - CO started, however formal discussion and evaluation with no true SI   - Supportive care and encouragement QD    CARDIOVASCULAR  # CP with NSTEMI < demand ischemia with HTN   - Admitted for CP and HTN with peaked T WAVES and ECG with ST deviation on admission   - Troponins elevated on admission with negative CKMB   - TTE (4/30) with EF 72, normal LVRVSF, and no regional wall motion abnormalities   - Case discussed with cardiology and no acute need for cath.   - DAPT loaded and continue on medical management with ASA and lipitor     # Septic vs vasoplegic shock  # Hx of HTN   - Home BP medications held and pressors weaned off   - Continue on coreg 12.5, hydralazine 100 TID and norvasc 10mg (increased 5/31)   - Monitor blood pressures with hydral 5mg IVP Q8H PRN   - Adjust BP regimen as needed     RESPIRATORY  # Respiratory failure   - AMS noted with baclofen toxicity requiring intubation   - Attempted extubation in MICU however course complicated by aspiration and prolonged course and now s/p tracheostomy with IP on 5/14  - Continue on vent 12/500/5/30 and weaned to TC ATC   - Continue on nebs and HTS Q12H for now   - Continue on TC ATC (5/30 - )     GI  # Dysphagia   - s/p surgical PEG 5/17   - Continue on tube feeds via PEG   - Attempt SS as able to tolerate TC ATC    # GIB  - Noted with several episodes of black stool with drop in Hgb (5/26 overnight) requiring PRBC with appropriate response  - Case discussed with GI, questionable anemia from AOCD with ESRD and black stool likely from iron, and no plan for scope at this time  - Holding heparin GTT as below and continue on PPI  - Monitor stools     RENAL  # ESRD on HD MWF with R BCF  # Fistula stenosis   - VA AVF (5/2) with Right radiocephalic arteriovenous fistula has no hemodynamically significant stenosis present at the anastomotic site ( cm/sec, EDV 49 cm/sec). The usable segment is partially thrombosed with minimal patency.  - Required R FEMORAL line on medicine, then removed on 5/2, and replaced with R IJ SHILEY on 5/3 for CRRT then converted back to iHD MWF   - R IJ SHILEY removed on 5/10 second to bacteremia and replaced on 5/13  - R IJ SHILEY removed 5/27 given intermittent fevers    - Blood cultures negative and plan to replace SHILEY TODAY   - Vein mapping with no adequate veins in UE for conduit (all <2 mm and/or thrombosed) and pending possible revision of RUE AVF.   Plan for fistulogram and fistula repair next week when BCx and SCx final   - Continue on HD MWF per Renal   - Continue on HCO3 650 tabs     # Hyperphosphatemia   - Continue on Renvela 1600 however phos corrects well with HD   - Monitor off Renvela     INFECTIOUS DISEASE  # Aspiration concern   - Recurrent fever spike and CXR with RLL opacity   - BCx (5/25 and 5/30) negative   - SCx (5/25) negative   - SCx (5/30) with citrobacter   - s/p Zosyn empirically (5/18 - 5/28) with intermittent fever spikes. Per discussion with ID will hold further ABX pending SCx sensitivities. Zosyn should cover for zosyn and currently with improvement in respiratory status.     # B Cereus Bacteremia   - Course complicated by fevers and aspiration event   - MRSA (5/1) negative   - BCx (5/2) negative   - SCx (5/4) and BAL (5/12) negative   - BCx (5/10) with bacillus cereus and cleared on (5/12).   - Case discussed with ID and s/p Vancomycin through 5/21 x 10 day course.   - Monitor off Abx for now    HEME  # AOCD with ESRD   - Anemia panel with AOCD and low % sat   - EPO 10K continued on TIW   - Ferrous supplement added   - - Transfused on 5/16 and 5/26 and will monitor HH     VASCULAR   # RLE DVT   - CT AP (5/12) with nonocclusive RIGHT common iliac vein deep venous thrombosis and case discussed with IR with no plans for IVC filter.   - Initially started on a Heparin GTT with course c/b questionable GIB given dark tarry stools, however more likely second to iron tabs.   - Challenge on HSQ with stable HH.   - Resume Heparin  GTT post OVIDIOLEY .     ENDOCRINE  # IDDM2 A1C 6.9  - c/w NPH 4U Q6 and ISS  - Monitor and adjust insulin based on FS     SKIN  - R FEMORAL (5/1-5/2)   - R IJ SHILEY (5/3-5/10)   - R IJ SHILEY (5/13 - 5/27)     ETHICS/ GOC    - FULL CODE     DISPO - PMR recc KIMI, however if strength improves while in house then plan for RPT PMR evaluation for acute rehab.   ***********************  6/1-shiley placed then HD-some oozing   74 YO M with PMHx of ESRD on HD MWF, HTN, and 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 Baclofen started and course complicated by AMS second to baclofen toxicity requiring intubation and MICU transfer. Course further complicated by LEFT pontine and cerebellar stroke, R AVF stenosis, aspiration and B Cereus bacteremia and RLE DVT. s/p trach and transferred to RCU 5/16 and course complicated by intermittent fever spikes with likely aspiration citrobacter PNA, AOCD, and GIB vs Fe stools.     NEUROLOGY  # AMS   - MRI HEAD (5/6) with punctate foci in the left talya and left cerebellum with concern for acute infarct.   - MRA HEAD and NECK (5/6) with no large vessel occlusion or stenosis.  - Continue on aspirin and hold DAPT for now pending procedures   - Continue on Lipitor for medical management     PSYCH   # Depression   - Concern for depression with questionable SI   - CO started, however formal discussion and evaluation with no true SI   - Supportive care and encouragement QD    CARDIOVASCULAR  # CP with NSTEMI < demand ischemia with HTN   - Admitted for CP and HTN with peaked T WAVES and ECG with ST deviation on admission   - Troponins elevated on admission with negative CKMB   - TTE (4/30) with EF 72, normal LVRVSF, and no regional wall motion abnormalities   - Case discussed with cardiology and no acute need for cath.   - DAPT loaded and continue on medical management with ASA and lipitor     # Septic vs vasoplegic shock  # Hx of HTN   - Home BP medications held and pressors weaned off   - Continue on coreg 25 (increased 6/1), hydralazine 100 TID and norvasc 10mg (increased 5/31)   - Monitor blood pressures with hydral 10mg IVP Q8H PRN   - IF remains hypertensive then add Imdur     RESPIRATORY  # Respiratory failure   - AMS noted with baclofen toxicity requiring intubation   - Attempted extubation in MICU however course complicated by aspiration and prolonged course and now s/p tracheostomy with IP on 5/14  - Continue on vent 12/500/5/30 and weaned to TC ATC   - Secretions thick and will hold HTS as with strong baseline cough and atrovent to prevent further thickening of secretions   - Continue on albuterol   - Continue on TC ATC (5/30 - ) and attempt PMV when able    GI  # Dysphagia   - s/p surgical PEG 5/17   - Failed green dye on 6/1 and continue on tube feeds via PEG   - Reattempt SS when able to tolerate PMV    # GIB  - Noted with several episodes of black stool with drop in Hgb (5/26 overnight) requiring PRBC with appropriate response  - Case discussed with GI, questionable anemia from AOCD with ESRD and black stool likely from iron, and no plan for scope at this time  - Continue on PPI and monitor stools    RENAL  # ESRD on HD MWF with R BCF  # Fistula stenosis   - VA AVF (5/2) with Right radiocephalic arteriovenous fistula has no hemodynamically significant stenosis present at the anastomotic site ( cm/sec, EDV 49 cm/sec). The usable segment is partially thrombosed with minimal patency.  - Required R FEMORAL line on medicine, then removed on 5/2, and replaced with R IJ SHILEY on 5/3 for CRRT then converted back to iHD MWF   - R IJ SHILEY removed on 5/10 second to bacteremia and replaced on 5/13  - R IJ SHILEY removed 5/27 given intermittent fevers    - Blood cultures negative and plan to replaced SHILEY on 6/1   - Vein mapping with no adequate veins in UE for conduit (all <2 mm and/or thrombosed) and pending possible revision of RUE AVF.   Plan for fistulogram and fistula repair next week   - Continue on HD MWF per Renal   - Continue on HCO3 650 tabs     # Hyperphosphatemia   - Continue on Renvela 1600 however phos corrects well with HD   - Monitor off Renvela     INFECTIOUS DISEASE  # Aspiration concern   - Recurrent fever spike and CXR with RLL opacity   - BCx (5/25 and 5/30) negative   - SCx (5/25) negative   - SCx (5/30) with citrobacter   - s/p Zosyn empirically (5/18 - 5/28) with intermittent fever spikes. Per discussion with ID will hold further ABX pending SCx sensitivities. Zosyn should cover for zosyn and currently with improvement in respiratory status.     # B Cereus Bacteremia   - Course complicated by fevers and aspiration event   - MRSA (5/1) negative   - BCx (5/2) negative   - SCx (5/4) and BAL (5/12) negative   - BCx (5/10) with bacillus cereus and cleared on (5/12).   - Case discussed with ID and s/p Vancomycin through 5/21 x 10 day course.   - Monitor off Abx for now    HEME  # AOCD with ESRD   - Anemia panel with AOCD and low % sat   - EPO 10K continued on TIW   - Ferrous supplement added   - - Transfused on 5/16 and 5/26 and will monitor HH     VASCULAR   # RLE DVT   - CT AP (5/12) with nonocclusive RIGHT common iliac vein deep venous thrombosis and case discussed with IR with no plans for IVC filter.   - Initially started on a Heparin GTT with course c/b questionable GIB given dark tarry stools, however more likely second to iron tabs.   - Challenge on HSQ with stable HH.   - Resume Heparin  GTT tomorrow vs tonight if oozing from shiley resolves     ENDOCRINE  # IDDM2 A1C 6.9  - c/w NPH 4U Q6 and ISS  - Monitor and adjust insulin based on FS     SKIN  - R FEMORAL (5/1-5/2)   - R IJ SHILEY (5/3-5/10)   - R IJ SHILEY (5/13 - 5/27)   - R IJ SHILEY (6/1 - )     ETHICS/ GOC    - FULL CODE     DISPO - PMR recc KIMI, however if strength improves while in house then plan for RPT PMR evaluation for acute rehab.     ***********************  6/1-shiley placed then HD-some oozing

## 2024-06-01 NOTE — SWALLOW BEDSIDE ASSESSMENT ADULT - COMMENTS
Adult Pulmonology PA 6/1: "72 YO M with PMHx of ESRD on HD MWF, HTN, and 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 Baclofen started and course complicated by AMS second to baclofen toxicity requiring intubation and MICU transfer. Course further complicated by LEFT pontine and cerebellar stroke, R AVF stenosis, aspiration and B Cereus bacteremia and RLE DVT. s/p trach and transferred to RCU 5/16 and course complicated by intermittent fever spikes with likely aspiration citrobacter PNA, AOCD, and GIB vs Fe stools."    Chest Xray 5/25: IMPRESSION: Follow-up with new haziness in the lower left hemithorax most consistent with layering effusion and atelectasis.    Of Note: Patient known to speech/swallow dept, bedside swallow evaluation completed during this admission on 5/11/24 w/recommendations: NPO w/consideration of short term non oral nutrition/hydration  Objective testing not warranted at this time due to patient with overt signs of aspiration on conservative consistencies and unable to maintain adequate level of arousal throughout evaluation.     Patient seen for green dye test, positioned upright in bed, awake and alert, followed simple commands. Patient w/#8 shiley in place; on 28% Trach collar with cuff deflated, aphonic. Adult Pulmonology PA 6/1: "74 YO M with PMHx of ESRD on HD MWF, HTN, and 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 Baclofen started and course complicated by AMS second to baclofen toxicity requiring intubation and MICU transfer. Course further complicated by LEFT pontine and cerebellar stroke, R AVF stenosis, aspiration and B Cereus bacteremia and RLE DVT. s/p trach and transferred to RCU 5/16 and course complicated by intermittent fever spikes with likely aspiration citrobacter PNA, AOCD, and GIB vs Fe stools."    Chest Xray 5/25: IMPRESSION: Follow-up with new haziness in the lower left hemithorax most consistent with layering effusion and atelectasis.    Of Note: Patient known to speech/swallow dept, bedside swallow evaluation completed during this admission on 5/11/24 w/recommendations: NPO w/consideration of short term non oral nutrition/hydration  Objective testing not warranted at this time due to patient with overt signs of aspiration on conservative consistencies and unable to maintain adequate level of arousal throughout evaluation.     Patient seen for green dye test, positioned upright in bed, awake and alert, followed simple commands. Patient w/#8 portex in place; on 28% Trach collar with cuff deflated, aphonic.

## 2024-06-01 NOTE — PROGRESS NOTE ADULT - SUBJECTIVE AND OBJECTIVE BOX
Vascular Surgery Progress Note     Subjective:  Patient seen and examined on AM rounds. Afebrile overnight, on trach collar, awake and alert.      Vital Signs:  Vital Signs Last 24 Hrs  T(C): 36.5 (01 Jun 2024 08:00), Max: 36.6 (31 May 2024 20:00)  T(F): 97.7 (01 Jun 2024 08:00), Max: 97.9 (31 May 2024 20:00)  HR: 75 (01 Jun 2024 08:35) (67 - 79)  BP: 172/74 (01 Jun 2024 08:00) (168/68 - 196/67)  BP(mean): 88 (31 May 2024 18:11) (88 - 88)  RR: 16 (01 Jun 2024 08:30) (16 - 20)  SpO2: 95% (01 Jun 2024 08:35) (94% - 100%)    Parameters below as of 01 Jun 2024 08:30  Patient On (Oxygen Delivery Method): tracheostomy collar  O2 Flow (L/min): 6  O2 Concentration (%): 28    CAPILLARY BLOOD GLUCOSE      POCT Blood Glucose.: 192 mg/dL (01 Jun 2024 06:23)  POCT Blood Glucose.: 169 mg/dL (31 May 2024 23:56)  POCT Blood Glucose.: 216 mg/dL (31 May 2024 17:24)  POCT Blood Glucose.: 224 mg/dL (31 May 2024 11:14)      I&O's Detail    31 May 2024 07:01  -  01 Jun 2024 07:00  --------------------------------------------------------  IN:    Enteral Tube Flush: 210 mL    Nepro: 990 mL    Other (mL): 400 mL  Total IN: 1600 mL    OUT:    Other (mL): 2400 mL  Total OUT: 2400 mL    Total NET: -800 mL      01 Jun 2024 07:01  -  01 Jun 2024 10:23  --------------------------------------------------------  IN:    Enteral Tube Flush: 60 mL  Total IN: 60 mL    OUT:  Total OUT: 0 mL    Total NET: 60 mL            Physical Exam:  General: NAD, resting comfortably in bed  HEENT: Normocephalic atraumatic  Respiratory: Nonlabored respirations  Cardio: regular rate  Vascular: R AVF w/ palpable thrill, palpable radial pulses b/l, R IJ shiley in place w/ minimal oozing      Labs:    06-01    137  |  96<L>  |  35<H>  ----------------------------<  186<H>  4.3   |  24  |  4.19<H>    Ca    8.9      01 Jun 2024 06:00  Phos  2.9     06-01  Mg     2.10     06-01                              8.9    11.03 )-----------( 458      ( 01 Jun 2024 06:00 )             27.4     PTT - ( 01 Jun 2024 09:25 )  PTT:43.9 sec

## 2024-06-01 NOTE — PROGRESS NOTE ADULT - NS ATTEND AMEND GEN_ALL_CORE FT
-    73M PMH ESRD on HD, HTN, and DM2 presented to [facility] on 4/29/24 with chest pressure and hiccups, admitted 2/2 concern for demand ischemia with hospital course c/b baclofen toxicity and acute ischemic CVA left talya/cerebellum c/b acute hypoxemic and hypercapnic respiratory failure s/p ETT intubation/MV with failure to wean from ventilator s/p tracheostomy. Hospital course further complicated by aspiration and B cereus bacteremia and RLE DVT. Now transferred to RCU 5/16 for further management.     #Neuro: resolved acute encephalopathy. MRI head 5/6 with L talya and L cerebellum ischemic infarcts with no vessel occlusion/stenosis on MRA. Will restart aspirin if he tolerates Heparin gtt. Continue statin  #CV: HD stable, increase amlodipine to 10 mg qd. Continue carvedilol and hydralazine  #Pulm: continue TC ATC, goal SpO2>90%. Check ABG on TC. ACT with Duonebs + 3%NaCl nebs  #GI: improving melena, H/H stable, tolerating HSQ. Continue PPI BID. Continue PEG feeds. S&S re-eval  #Renal: ESRD, plan for IR-placement of Shiley catheter for HD today. If fevers resolve and blood cultures are negative, will discuss with vascular regarding fistulogram/thrombectomy next week  #ID: no further fevers since 100.3 axillary on 5/30. Endotracheal culture with Citrobacter koseri, sensitivities pending. Follow-up ID regarding need for antibiotics. Repeat blood cultures pending. Recently completed course of Zosyn 5/28 for aspiration pneumonia and B. cereus bacteremia  #Heme: non-occlusive R common iliac DVT on CT A/P, not seen on RLE duplex. Start Heparin gtt given that he is tolerating HSQ, H/H stable. If tolerates a/c, then can consider restarting aspirin  #Dispo: full code, discussed with patient and wife at bedside. PM&R eval for acute rehab when ready for discharge (hopefully by next week) as above:  73M PMH ESRD on HD, HTN, and DM2 presented to [facility] on 4/29/24 with chest pressure and hiccups, admitted 2/2 concern for demand ischemia with hospital course c/b baclofen toxicity and acute ischemic CVA left talya/cerebellum c/b acute hypoxemic and hypercapnic respiratory failure s/p ETT intubation/MV with failure to wean from ventilator s/p tracheostomy. Hospital course further complicated by aspiration and B cereus bacteremia and RLE DVT. Now transferred to RCU 5/16 for further management.     #Neuro: resolved acute encephalopathy. MRI head 5/6 with L talya and L cerebellum ischemic infarcts with no vessel occlusion/stenosis on MRA. Will restart aspirin if he tolerates Heparin gtt. Continue statin  #CV: HD stable, increase amlodipine to 10 mg qd. Continue carvedilol and hydralazine  #Pulm: continue TC ATC, goal SpO2>90%. s/p ABG on TC. ACT with albuterol only (minus atrovent + 3%NaCl nebs)  #GI: improving melena, H/H stable, tolerating HSQ. Continue PPI BID. Continue PEG feeds. S&S [id number]  #Renal: ESRD, plan for IR-placement of Shiley catheter for HD 5/31. If fevers resolve and blood cultures are negative, will discuss with vascular regarding fistulogram/thrombectomy next week  #ID: no further fevers since 100.3 axillary on 5/30. Endotracheal culture with Citrobacter koseri, sensitivities pending. Follow-up ID regarding need for antibiotics. Repeat blood cultures pending. Recently completed course of Zosyn 5/28 for aspiration pneumonia and B. cereus bacteremia  #Heme: non-occlusive R common iliac DVT on CT A/P, not seen on RLE duplex. Start Heparin gtt given that he is tolerating HSQ, H/H stable. If tolerates a/c, then can consider restarting aspirin  #Dispo: full code, discussed with patient and wife at bedside. PM&R eval for acute rehab when ready for discharge (hopefully by next week)    Luis Alfredo Child MD-Pulmonary   [phone]

## 2024-06-01 NOTE — PROGRESS NOTE ADULT - SUBJECTIVE AND OBJECTIVE BOX
Patient is a 73y Male     Patient is a 73y old  Male who presents with a chief complaint of Unstable angina, abn EKG (01 Jun 2024 10:23)      HPI:  73-year-old male past medical history hypertension, ESRD on dialysis Monday Wednesday Friday, diabetes insulin-dependent presents with hiccups patient endorses persistent hiccups for the last 2 days. found to have peaked T waves, a new finding. denies dizziness, N/V, denies CP, palps, abd pain (29 Apr 2024 21:15)      PAST MEDICAL & SURGICAL HISTORY:  Diabetes      Benign essential HTN      HLD (hyperlipidemia)      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      ESRD on hemodialysis      Arteriovenous fistula          MEDICATIONS  (STANDING):  albuterol    0.083% 2.5 milliGRAM(s) Nebulizer every 6 hours  amLODIPine   Tablet 10 milliGRAM(s) Oral daily  atorvastatin 20 milliGRAM(s) Oral at bedtime  carvedilol 25 milliGRAM(s) Oral every 12 hours  carvedilol 12.5 milliGRAM(s) Oral once  chlorhexidine 0.12% Liquid 15 milliLiter(s) Oral Mucosa every 12 hours  chlorhexidine 2% Cloths 1 Application(s) Topical <User Schedule>  dextrose 10% Bolus 125 milliLiter(s) IV Bolus once  dextrose 5%. 1000 milliLiter(s) (100 mL/Hr) IV Continuous <Continuous>  dextrose 5%. 1000 milliLiter(s) (50 mL/Hr) IV Continuous <Continuous>  dextrose 50% Injectable 25 Gram(s) IV Push once  dextrose 50% Injectable 12.5 Gram(s) IV Push once  epoetin reilly (EPOGEN) Injectable 30941 Unit(s) IV Push <User Schedule>  ferrous    sulfate Liquid 300 milliGRAM(s) Enteral Tube daily  glucagon  Injectable 1 milliGRAM(s) IntraMuscular once  heparin   Injectable 5000 Unit(s) SubCutaneous every 12 hours  hydrALAZINE 100 milliGRAM(s) Oral three times a day  insulin lispro (ADMELOG) corrective regimen sliding scale   SubCutaneous every 6 hours  insulin NPH human recombinant 4 Unit(s) SubCutaneous every 6 hours  pantoprazole  Injectable 40 milliGRAM(s) IV Push every 12 hours  sodium bicarbonate 650 milliGRAM(s) Oral every 8 hours      Allergies    No Known Allergies    Intolerances        SOCIAL HISTORY:  Denies ETOh,Smoking,     FAMILY HISTORY:  FHx: diabetes mellitus (Father, Aunt)        REVIEW OF SYSTEMS:    CONSTITUTIONAL: No weakness, fevers or chills  EYES/ENT: No visual changes;  No vertigo or throat pain   NECK: No pain or stiffness  RESPIRATORY: No cough, wheezing, hemoptysis; No shortness of breath  CARDIOVASCULAR: No chest pain or palpitations  GASTROINTESTINAL: No abdominal or epigastric pain. No nausea, vomiting, or hematemesis; No diarrhea or constipation. No melena or hematochezia.  GENITOURINARY: No dysuria, frequency or hematuria  NEUROLOGICAL: No numbness or weakness  SKIN: No itching, burning, rashes, or lesions   All other review of systems is negative unless indicated above.    VITAL:  T(C): , Max: 36.6 (05-31-24 @ 20:00)  T(F): , Max: 97.9 (05-31-24 @ 20:00)  HR: 75 (06-01-24 @ 08:35)  BP: 172/74 (06-01-24 @ 08:00)  BP(mean): 88 (05-31-24 @ 18:11)  RR: 16 (06-01-24 @ 08:30)  SpO2: 95% (06-01-24 @ 08:35)  Wt(kg): --    I and O's:    05-30 @ 07:01  -  05-31 @ 07:00  --------------------------------------------------------  IN: 540 mL / OUT: 0 mL / NET: 540 mL    05-31 @ 07:01  -  06-01 @ 07:00  --------------------------------------------------------  IN: 1600 mL / OUT: 2400 mL / NET: -800 mL    06-01 @ 07:01  -  06-01 @ 10:38  --------------------------------------------------------  IN: 60 mL / OUT: 0 mL / NET: 60 mL          PHYSICAL EXAM:    Constitutional: NAD  HEENT: PERRLA,   Neck: No JVD  Respiratory: CTA B/L  Cardiovascular: S1 and S2  Gastrointestinal: BS+, soft, NT/ND  Extremities: No peripheral edema  Neurological: A/O x 3, no focal deficits  Psychiatric: Normal mood, normal affect  : No Abbasi  Skin: No rashes  Access: Not applicable  Back: No CVA tenderness    LABS:                        8.9    11.03 )-----------( 458      ( 01 Jun 2024 06:00 )             27.4     06-01    137  |  96<L>  |  35<H>  ----------------------------<  186<H>  4.3   |  24  |  4.19<H>    Ca    8.9      01 Jun 2024 06:00  Phos  2.9     06-01  Mg     2.10     06-01            RADIOLOGY & ADDITIONAL STUDIES:

## 2024-06-02 LAB
ANION GAP SERPL CALC-SCNC: 14 MMOL/L — SIGNIFICANT CHANGE UP (ref 7–14)
ANISOCYTOSIS BLD QL: SIGNIFICANT CHANGE UP
APTT BLD: 107 SEC — HIGH (ref 24.5–35.6)
APTT BLD: 138.1 SEC — CRITICAL HIGH (ref 24.5–35.6)
APTT BLD: 35.4 SEC — SIGNIFICANT CHANGE UP (ref 24.5–35.6)
BASOPHILS # BLD AUTO: 0.18 K/UL — SIGNIFICANT CHANGE UP (ref 0–0.2)
BASOPHILS NFR BLD AUTO: 1.9 % — SIGNIFICANT CHANGE UP (ref 0–2)
BUN SERPL-MCNC: 50 MG/DL — HIGH (ref 7–23)
CALCIUM SERPL-MCNC: 8.5 MG/DL — SIGNIFICANT CHANGE UP (ref 8.4–10.5)
CHLORIDE SERPL-SCNC: 94 MMOL/L — LOW (ref 98–107)
CO2 SERPL-SCNC: 27 MMOL/L — SIGNIFICANT CHANGE UP (ref 22–31)
CREAT SERPL-MCNC: 5.26 MG/DL — HIGH (ref 0.5–1.3)
EGFR: 11 ML/MIN/1.73M2 — LOW
EOSINOPHIL # BLD AUTO: 0.09 K/UL — SIGNIFICANT CHANGE UP (ref 0–0.5)
EOSINOPHIL NFR BLD AUTO: 0.9 % — SIGNIFICANT CHANGE UP (ref 0–6)
GIANT PLATELETS BLD QL SMEAR: PRESENT — SIGNIFICANT CHANGE UP
GLUCOSE BLDC GLUCOMTR-MCNC: 186 MG/DL — HIGH (ref 70–99)
GLUCOSE BLDC GLUCOMTR-MCNC: 194 MG/DL — HIGH (ref 70–99)
GLUCOSE BLDC GLUCOMTR-MCNC: 202 MG/DL — HIGH (ref 70–99)
GLUCOSE BLDC GLUCOMTR-MCNC: 228 MG/DL — HIGH (ref 70–99)
GLUCOSE SERPL-MCNC: 191 MG/DL — HIGH (ref 70–99)
HCT VFR BLD CALC: 22.8 % — LOW (ref 39–50)
HCT VFR BLD CALC: 23.1 % — LOW (ref 39–50)
HCT VFR BLD CALC: 24.1 % — LOW (ref 39–50)
HGB BLD-MCNC: 7.4 G/DL — LOW (ref 13–17)
HGB BLD-MCNC: 7.5 G/DL — LOW (ref 13–17)
HGB BLD-MCNC: 7.9 G/DL — LOW (ref 13–17)
HYPOCHROMIA BLD QL: SIGNIFICANT CHANGE UP
IANC: 6.91 K/UL — SIGNIFICANT CHANGE UP (ref 1.8–7.4)
LYMPHOCYTES # BLD AUTO: 0.88 K/UL — LOW (ref 1–3.3)
LYMPHOCYTES # BLD AUTO: 9.3 % — LOW (ref 13–44)
MACROCYTES BLD QL: SLIGHT — SIGNIFICANT CHANGE UP
MAGNESIUM SERPL-MCNC: 2.2 MG/DL — SIGNIFICANT CHANGE UP (ref 1.6–2.6)
MCHC RBC-ENTMCNC: 21.8 PG — LOW (ref 27–34)
MCHC RBC-ENTMCNC: 22 PG — LOW (ref 27–34)
MCHC RBC-ENTMCNC: 22.1 PG — LOW (ref 27–34)
MCHC RBC-ENTMCNC: 32 GM/DL — SIGNIFICANT CHANGE UP (ref 32–36)
MCHC RBC-ENTMCNC: 32.8 GM/DL — SIGNIFICANT CHANGE UP (ref 32–36)
MCHC RBC-ENTMCNC: 32.9 GM/DL — SIGNIFICANT CHANGE UP (ref 32–36)
MCV RBC AUTO: 66.9 FL — LOW (ref 80–100)
MCV RBC AUTO: 67.5 FL — LOW (ref 80–100)
MCV RBC AUTO: 68.1 FL — LOW (ref 80–100)
METAMYELOCYTES # FLD: 1.9 % — HIGH (ref 0–1)
MICROCYTES BLD QL: SIGNIFICANT CHANGE UP
MONOCYTES # BLD AUTO: 0.79 K/UL — SIGNIFICANT CHANGE UP (ref 0–0.9)
MONOCYTES NFR BLD AUTO: 8.4 % — SIGNIFICANT CHANGE UP (ref 2–14)
MYELOCYTES NFR BLD: 2.8 % — HIGH (ref 0–0)
NEUTROPHILS # BLD AUTO: 7.08 K/UL — SIGNIFICANT CHANGE UP (ref 1.8–7.4)
NEUTROPHILS NFR BLD AUTO: 72.9 % — SIGNIFICANT CHANGE UP (ref 43–77)
NEUTS BAND # BLD: 1.9 % — SIGNIFICANT CHANGE UP (ref 0–6)
NRBC # BLD: 0 /100 WBCS — SIGNIFICANT CHANGE UP (ref 0–0)
NRBC # BLD: 0 /100 WBCS — SIGNIFICANT CHANGE UP (ref 0–0)
NRBC # BLD: 1 /100 WBCS — HIGH (ref 0–0)
NRBC # FLD: 0.04 K/UL — HIGH (ref 0–0)
NRBC # FLD: 0.05 K/UL — HIGH (ref 0–0)
PHOSPHATE SERPL-MCNC: 3 MG/DL — SIGNIFICANT CHANGE UP (ref 2.5–4.5)
PLAT MORPH BLD: NORMAL — SIGNIFICANT CHANGE UP
PLATELET # BLD AUTO: 401 K/UL — HIGH (ref 150–400)
PLATELET # BLD AUTO: 423 K/UL — HIGH (ref 150–400)
PLATELET # BLD AUTO: 429 K/UL — HIGH (ref 150–400)
PLATELET COUNT - ESTIMATE: NORMAL — SIGNIFICANT CHANGE UP
POIKILOCYTOSIS BLD QL AUTO: SLIGHT — SIGNIFICANT CHANGE UP
POLYCHROMASIA BLD QL SMEAR: SLIGHT — SIGNIFICANT CHANGE UP
POTASSIUM SERPL-MCNC: 4.6 MMOL/L — SIGNIFICANT CHANGE UP (ref 3.5–5.3)
POTASSIUM SERPL-SCNC: 4.6 MMOL/L — SIGNIFICANT CHANGE UP (ref 3.5–5.3)
RBC # BLD: 3.39 M/UL — LOW (ref 4.2–5.8)
RBC # BLD: 3.41 M/UL — LOW (ref 4.2–5.8)
RBC # BLD: 3.57 M/UL — LOW (ref 4.2–5.8)
RBC # FLD: 22.5 % — HIGH (ref 10.3–14.5)
RBC # FLD: 22.7 % — HIGH (ref 10.3–14.5)
RBC # FLD: 23.5 % — HIGH (ref 10.3–14.5)
RBC BLD AUTO: NORMAL — SIGNIFICANT CHANGE UP
SMUDGE CELLS # BLD: PRESENT — SIGNIFICANT CHANGE UP
SODIUM SERPL-SCNC: 135 MMOL/L — SIGNIFICANT CHANGE UP (ref 135–145)
TARGETS BLD QL SMEAR: SLIGHT — SIGNIFICANT CHANGE UP
WBC # BLD: 10.33 K/UL — SIGNIFICANT CHANGE UP (ref 3.8–10.5)
WBC # BLD: 9.43 K/UL — SIGNIFICANT CHANGE UP (ref 3.8–10.5)
WBC # BLD: 9.46 K/UL — SIGNIFICANT CHANGE UP (ref 3.8–10.5)
WBC # FLD AUTO: 10.33 K/UL — SIGNIFICANT CHANGE UP (ref 3.8–10.5)
WBC # FLD AUTO: 9.43 K/UL — SIGNIFICANT CHANGE UP (ref 3.8–10.5)
WBC # FLD AUTO: 9.46 K/UL — SIGNIFICANT CHANGE UP (ref 3.8–10.5)

## 2024-06-02 PROCEDURE — 99232 SBSQ HOSP IP/OBS MODERATE 35: CPT

## 2024-06-02 RX ORDER — HEPARIN SODIUM 5000 [USP'U]/ML
2000 INJECTION INTRAVENOUS; SUBCUTANEOUS EVERY 6 HOURS
Refills: 0 | Status: DISCONTINUED | OUTPATIENT
Start: 2024-06-02 | End: 2024-06-03

## 2024-06-02 RX ORDER — HEPARIN SODIUM 5000 [USP'U]/ML
INJECTION INTRAVENOUS; SUBCUTANEOUS
Qty: 25000 | Refills: 0 | Status: DISCONTINUED | OUTPATIENT
Start: 2024-06-02 | End: 2024-06-03

## 2024-06-02 RX ORDER — HEPARIN SODIUM 5000 [USP'U]/ML
4000 INJECTION INTRAVENOUS; SUBCUTANEOUS EVERY 6 HOURS
Refills: 0 | Status: DISCONTINUED | OUTPATIENT
Start: 2024-06-02 | End: 2024-06-03

## 2024-06-02 RX ADMIN — Medication 650 MILLIGRAM(S): at 21:33

## 2024-06-02 RX ADMIN — ALBUTEROL 2.5 MILLIGRAM(S): 90 AEROSOL, METERED ORAL at 02:35

## 2024-06-02 RX ADMIN — Medication 4: at 23:59

## 2024-06-02 RX ADMIN — ALBUTEROL 2.5 MILLIGRAM(S): 90 AEROSOL, METERED ORAL at 08:34

## 2024-06-02 RX ADMIN — ALBUTEROL 2.5 MILLIGRAM(S): 90 AEROSOL, METERED ORAL at 15:41

## 2024-06-02 RX ADMIN — Medication 650 MILLIGRAM(S): at 14:09

## 2024-06-02 RX ADMIN — Medication 2: at 05:39

## 2024-06-02 RX ADMIN — ALBUTEROL 2.5 MILLIGRAM(S): 90 AEROSOL, METERED ORAL at 19:52

## 2024-06-02 RX ADMIN — HUMAN INSULIN 4 UNIT(S): 100 INJECTION, SUSPENSION SUBCUTANEOUS at 17:20

## 2024-06-02 RX ADMIN — CHLORHEXIDINE GLUCONATE 15 MILLILITER(S): 213 SOLUTION TOPICAL at 17:22

## 2024-06-02 RX ADMIN — Medication 300 MILLIGRAM(S): at 11:27

## 2024-06-02 RX ADMIN — HEPARIN SODIUM 900 UNIT(S)/HR: 5000 INJECTION INTRAVENOUS; SUBCUTANEOUS at 19:04

## 2024-06-02 RX ADMIN — HEPARIN SODIUM 1000 UNIT(S)/HR: 5000 INJECTION INTRAVENOUS; SUBCUTANEOUS at 12:03

## 2024-06-02 RX ADMIN — CARVEDILOL PHOSPHATE 25 MILLIGRAM(S): 80 CAPSULE, EXTENDED RELEASE ORAL at 05:46

## 2024-06-02 RX ADMIN — AMLODIPINE BESYLATE 10 MILLIGRAM(S): 2.5 TABLET ORAL at 05:42

## 2024-06-02 RX ADMIN — HUMAN INSULIN 4 UNIT(S): 100 INJECTION, SUSPENSION SUBCUTANEOUS at 11:34

## 2024-06-02 RX ADMIN — HEPARIN SODIUM 5000 UNIT(S): 5000 INJECTION INTRAVENOUS; SUBCUTANEOUS at 05:42

## 2024-06-02 RX ADMIN — HUMAN INSULIN 4 UNIT(S): 100 INJECTION, SUSPENSION SUBCUTANEOUS at 05:39

## 2024-06-02 RX ADMIN — Medication 100 MILLIGRAM(S): at 11:27

## 2024-06-02 RX ADMIN — HEPARIN SODIUM 900 UNIT(S)/HR: 5000 INJECTION INTRAVENOUS; SUBCUTANEOUS at 18:53

## 2024-06-02 RX ADMIN — PANTOPRAZOLE SODIUM 40 MILLIGRAM(S): 20 TABLET, DELAYED RELEASE ORAL at 17:19

## 2024-06-02 RX ADMIN — Medication 650 MILLIGRAM(S): at 05:41

## 2024-06-02 RX ADMIN — PANTOPRAZOLE SODIUM 40 MILLIGRAM(S): 20 TABLET, DELAYED RELEASE ORAL at 05:43

## 2024-06-02 RX ADMIN — ATORVASTATIN CALCIUM 20 MILLIGRAM(S): 80 TABLET, FILM COATED ORAL at 21:33

## 2024-06-02 RX ADMIN — Medication 4: at 11:34

## 2024-06-02 RX ADMIN — Medication 100 MILLIGRAM(S): at 04:02

## 2024-06-02 RX ADMIN — Medication 2: at 17:20

## 2024-06-02 RX ADMIN — CHLORHEXIDINE GLUCONATE 1 APPLICATION(S): 213 SOLUTION TOPICAL at 05:40

## 2024-06-02 RX ADMIN — CARVEDILOL PHOSPHATE 25 MILLIGRAM(S): 80 CAPSULE, EXTENDED RELEASE ORAL at 17:24

## 2024-06-02 RX ADMIN — Medication 10 MILLIGRAM(S): at 15:33

## 2024-06-02 RX ADMIN — Medication 100 MILLIGRAM(S): at 20:33

## 2024-06-02 NOTE — PROVIDER CONTACT NOTE (OTHER) - ASSESSMENT
/63   HR 70  Spo2 100%  RR 18  T 99F rectally
Patient remains alert and oriented, noted with strong cough. VS assessed, . Currently on IV heparin infusing at 10 mL/hr
Vital signs stable. Pt is A&O x 0. Not in acute distress.
Bradycardia 45-49 while asleep  50s while awake. pt remains asymptomatic. other VS WDL
Pt is resting in bed, no s/s of distress noted.
vent dependent
Patient received 1 hour and 40 minutes of HD treatment. Treatment paused x1 for catheter assessment by Primary Provider. Unable to complete after restart. Dr. Kraft notified.
Patient vitals signs were stable during treatment but confused and not able to understand procedure/treatment.
patient is intubated and sedated. BP 84/47 on left lower leg, HR 72. RR 18, o2 saturation 97%.
No s/s of distress noted.
Pt bp 181/57
Pt bp 190/56. Pt denies pain and SOB. Pt currently receiving dialysis.

## 2024-06-02 NOTE — PROVIDER CONTACT NOTE (OTHER) - DATE AND TIME:
13-May-2024 22:00
22-May-2024 07:47
02-Jun-2024 15:37
01-May-2024 12:02
07-May-2024 13:30
03-May-2024 21:16
20-May-2024 08:03
20-May-2024 10:36
30-Apr-2024 04:00
24-May-2024 06:00
28-May-2024 20:10
01-May-2024 21:33

## 2024-06-02 NOTE — PROVIDER CONTACT NOTE (OTHER) - BACKGROUND
Patient admitted for Unstable angina, abn EKG and elevated K level.
Pt admitted for electrolyte and fluid imbalance. Pt has PMH of benign essential HTN
Pt admitted for disorder of electrolyte and fluid balance
Pt admitted for disorder of electrolytes and fluid imbalance. Pt has PMH of benign essential HTN
Admitted for other disorders of electrolyte and fluid balance
ESRD on HD  Fluid and Electrolytes Imbalance
Pt admitted for disorder of electrolyte and fluid balance
Scheduled for 3 hours HD with removal of 1 Kg of fluid.
ESRD
Patient with asymptomatic bradycardia
ESRD
ESRD

## 2024-06-02 NOTE — PROVIDER CONTACT NOTE (OTHER) - RECOMMENDATIONS
Hold Coreg.
push/ hold coreg
will continue to monitor the patient
Provider notified.
Recommendation for Primary Provider to re-evaluate.
BP meds given as standing order at 8pm  Provider made aware
Provider notified.
Provider recommendation
Will contact Vascular for shiley placement for HD.
Notify nephrology of low BP below parameters.
Provider aware
Recommend to end treatment since recannulization of venous needle is not possible at the time. BMP will be drawn @4pm and result will determine to bring patient back for dialysis tonight or tomorrow.

## 2024-06-02 NOTE — PROGRESS NOTE ADULT - ASSESSMENT
73-year-old male past medical history hypertension, ESRD on dialysis Monday Wednesday Friday, diabetes insulin-dependent presents with hiccups patient endorses persistent hiccups for the last 2 days. Nephrology consulted for HD needs.    A/P  ESRD:  Center: [place]  Nephrologist: Dr. Hardwick  Access: R AVF  MWF schedule  Vascular for fistulogram   s/p femoral shiley on 5/1, now removed  s/p placement of IJ shiley 5/3  Stopped CRRT   Restarted IHD  s/p HD 5/7 UF 1778; treatment terminated early due to hypotension   S/P MRA head/neck w/ gadolinium --> Received HD on 5/9 and 5/10.  5/10 Will need to dialyze 3 days consecutively--> plan for HD on 5/11 5/11 shiley removed due to bacteremia; did not receive HD.  Line holiday  5/12 - BUN worsened; K up trending.    5/13 Shiley placed.  5/17: s/p PEG placement.   5/21 IR consulted for shiley exchange; IR recommended to keep current shiley in place, with pending fistulogram   5/25 - Pt still did not go for fistulogram d/t scheduling issues.  Please exchange shiley that was placed on 5/13 or switch to PC. D/W team.  5/27 Shiley removed after HD.  5/30 - pt remains w/o access; remains on line holiday.  Electrolytes and volume status acceptable at current.  D/W medicine team regarding shiley placement by IR; planned for tomorrow, followed by HD.  Pending fistulogram/thrombectomy on Mon/Tues next week.   5/31 - planned for shiley placement today; HD after.  Continue HD MWF   Consent obtained and placed in ED chart  Renal diet  Monitor BMP  [place] for [place] where his outpatient Nephrologist, Dr. Hardwick can follow him    Hyperkalemia/Hypokalemia  Improved w/ HD.  C/W HD schedule as above.  Lokelma 10gm PRN for K >5.3 on non-HD days  PhosNaK given 5/21 and again 5/23.  K stable.  Low K diet.  Monitor closely.    HTN:  BP fluctuating; high.  On carvedilol 12.5mg BID, hydralazine 100mg TID.  Currently off nifedipine --> consider restarting nifedipine @ 30mg qd if BP remains suboptimal post HD.  UF w/ HD as BP permits.  Monitor BP.    Anemia:  Hgb low.  + iron deficiency.  Tsat 13% - on ferrous sulfate 300mg qd via PEG.  Venofer held d/t leukocytosis.  SILVA w/ HD.  Transfuse for Hgb <7.  Monitor Hb.    CKD-MBD   - low for CKD.  PO4 at goal; prev. low.  Sevelamer 1600mg TID d/c'ed 5/21.  S/p PhosNaK x2 doses 5/23.  Monitor Ca, PO4 daily    Hypocalcemia:  In setting of CKD vs. hypoalbuminemia.  Optimize albumin.  Corrected Ca WNL.  Replete as needed.  Monitor Ca.

## 2024-06-02 NOTE — PROGRESS NOTE ADULT - SUBJECTIVE AND OBJECTIVE BOX
Patient is a 73y old  Male who presents with a chief complaint of Unstable angina, abn EKG (02 Jun 2024 17:22)      SUBJECTIVE / OVERNIGHT EVENTS: no new events, BP is elevated     MEDICATIONS  (STANDING):  albuterol    0.083% 2.5 milliGRAM(s) Nebulizer every 6 hours  amLODIPine   Tablet 10 milliGRAM(s) Oral daily  atorvastatin 20 milliGRAM(s) Oral at bedtime  carvedilol 25 milliGRAM(s) Oral every 12 hours  chlorhexidine 0.12% Liquid 15 milliLiter(s) Oral Mucosa every 12 hours  chlorhexidine 2% Cloths 1 Application(s) Topical <User Schedule>  dextrose 10% Bolus 125 milliLiter(s) IV Bolus once  dextrose 5%. 1000 milliLiter(s) (100 mL/Hr) IV Continuous <Continuous>  dextrose 5%. 1000 milliLiter(s) (50 mL/Hr) IV Continuous <Continuous>  dextrose 50% Injectable 25 Gram(s) IV Push once  dextrose 50% Injectable 12.5 Gram(s) IV Push once  epoetin reilly (EPOGEN) Injectable 78087 Unit(s) IV Push <User Schedule>  ferrous    sulfate Liquid 300 milliGRAM(s) Enteral Tube daily  glucagon  Injectable 1 milliGRAM(s) IntraMuscular once  heparin  Infusion.  Unit(s)/Hr (10 mL/Hr) IV Continuous <Continuous>  hydrALAZINE 100 milliGRAM(s) Oral three times a day  insulin lispro (ADMELOG) corrective regimen sliding scale   SubCutaneous every 6 hours  insulin NPH human recombinant 4 Unit(s) SubCutaneous every 6 hours  pantoprazole  Injectable 40 milliGRAM(s) IV Push every 12 hours  sodium bicarbonate 650 milliGRAM(s) Oral every 8 hours    MEDICATIONS  (PRN):  acetaminophen   Oral Liquid .. 650 milliGRAM(s) Oral every 6 hours PRN Temp greater or equal to 38C (100.4F), Moderate Pain (4 - 6)  dextrose Oral Gel 15 Gram(s) Oral once PRN Blood Glucose LESS THAN 70 milliGRAM(s)/deciliter  heparin   Injectable 4000 Unit(s) IV Push every 6 hours PRN For aPTT less than 40  heparin   Injectable 2000 Unit(s) IV Push every 6 hours PRN For aPTT between 40 - 57  hydrALAZINE Injectable 10 milliGRAM(s) IV Push every 8 hours PRN SBP > 180  melatonin 3 milliGRAM(s) Oral at bedtime PRN Insomnia  sodium chloride 0.9% lock flush 10 milliLiter(s) IV Push every 1 hour PRN Pre/post blood products, medications, blood draw, and to maintain line patency      Vital Signs Last 24 Hrs  T(F): 98.4 (06-02-24 @ 15:21), Max: 98.4 (06-02-24 @ 15:21)  HR: 59 (06-02-24 @ 19:54) (59 - 77)  BP: 185/62 (06-02-24 @ 17:23) (168/54 - 185/62)  RR: 18 (06-02-24 @ 19:54) (16 - 24)  SpO2: 100% (06-02-24 @ 19:54) (97% - 100%)  Telemetry:   CAPILLARY BLOOD GLUCOSE      POCT Blood Glucose.: 186 mg/dL (02 Jun 2024 17:07)  POCT Blood Glucose.: 202 mg/dL (02 Jun 2024 11:24)  POCT Blood Glucose.: 194 mg/dL (02 Jun 2024 05:38)  POCT Blood Glucose.: 246 mg/dL (01 Jun 2024 23:17)    I&O's Summary    01 Jun 2024 07:01  -  02 Jun 2024 07:00  --------------------------------------------------------  IN: 1560 mL / OUT: 0 mL / NET: 1560 mL    02 Jun 2024 07:01  -  02 Jun 2024 21:33  --------------------------------------------------------  IN: 900 mL / OUT: 0 mL / NET: 900 mL        PHYSICAL EXAM:  GENERAL: NAD, well-developed  HEAD:  Atraumatic, Normocephalic  EYES: EOMI, PERRLA, conjunctiva and sclera clear  NECK: Supple, No JVD  CHEST/LUNG: Clear to auscultation bilaterally; No wheeze  HEART: Regular rate and rhythm; No murmurs, rubs, or gallops  ABDOMEN: Soft, Nontender, Nondistended; Bowel sounds present  EXTREMITIES:  2+ Peripheral Pulses, No clubbing, cyanosis, or edema  PSYCH: AAOx3  NEUROLOGY: non-focal  SKIN: No rashes or lesions    LABS:                        7.5    9.43  )-----------( 423      ( 02 Jun 2024 17:47 )             22.8     06-02    135  |  94<L>  |  50<H>  ----------------------------<  191<H>  4.6   |  27  |  5.26<H>    Ca    8.5      02 Jun 2024 06:00  Phos  3.0     06-02  Mg     2.20     06-02      PT/INR - ( 01 Jun 2024 09:25 )   PT: 12.3 sec;   INR: 1.09 ratio         PTT - ( 02 Jun 2024 17:47 )  PTT:107.0 sec      Urinalysis Basic - ( 02 Jun 2024 06:00 )    Color: x / Appearance: x / SG: x / pH: x  Gluc: 191 mg/dL / Ketone: x  / Bili: x / Urobili: x   Blood: x / Protein: x / Nitrite: x   Leuk Esterase: x / RBC: x / WBC x   Sq Epi: x / Non Sq Epi: x / Bacteria: x        RADIOLOGY & ADDITIONAL TESTS:    Imaging Personally Reviewed:    Consultant(s) Notes Reviewed:      Care Discussed with Consultants/Other Providers:

## 2024-06-02 NOTE — PROGRESS NOTE ADULT - SUBJECTIVE AND OBJECTIVE BOX
CHIEF COMPLAINT: Patient is a 73y old  Male who presents with a chief complaint of Unstable angina, abn EKG (02 Jun 2024 09:00)    Interval Events:    REVIEW OF SYSTEMS:  [ ] All other systems negative  [ ] Unable to assess ROS because ________    Mode: standby    OBJECTIVE:  ICU Vital Signs Last 24 Hrs  T(C): 36.4 (02 Jun 2024 06:00), Max: 37 (01 Jun 2024 16:21)  T(F): 97.5 (02 Jun 2024 06:00), Max: 98.6 (01 Jun 2024 16:21)  HR: 60 (02 Jun 2024 09:16) (59 - 80)  BP: 179/57 (02 Jun 2024 06:00) (155/67 - 195/75)  BP(mean): 89 (02 Jun 2024 06:00) (88 - 89)  ABP: --  ABP(mean): --  RR: 17 (02 Jun 2024 07:38) (16 - 24)  SpO2: 100% (02 Jun 2024 09:16) (95% - 100%)    O2 Parameters below as of 02 Jun 2024 09:16  Patient On (Oxygen Delivery Method): tracheostomy collar    Mode: standby    06-01 @ 07:01  -  06-02 @ 07:00  --------------------------------------------------------  IN: 1560 mL / OUT: 0 mL / NET: 1560 mL    CAPILLARY BLOOD GLUCOSE    POCT Blood Glucose.: 194 mg/dL (02 Jun 2024 05:38)    HOSPITAL MEDICATIONS:  MEDICATIONS  (STANDING):  albuterol    0.083% 2.5 milliGRAM(s) Nebulizer every 6 hours  amLODIPine   Tablet 10 milliGRAM(s) Oral daily  atorvastatin 20 milliGRAM(s) Oral at bedtime  carvedilol 25 milliGRAM(s) Oral every 12 hours  chlorhexidine 0.12% Liquid 15 milliLiter(s) Oral Mucosa every 12 hours  chlorhexidine 2% Cloths 1 Application(s) Topical <User Schedule>  dextrose 10% Bolus 125 milliLiter(s) IV Bolus once  dextrose 5%. 1000 milliLiter(s) (100 mL/Hr) IV Continuous <Continuous>  dextrose 5%. 1000 milliLiter(s) (50 mL/Hr) IV Continuous <Continuous>  dextrose 50% Injectable 25 Gram(s) IV Push once  dextrose 50% Injectable 12.5 Gram(s) IV Push once  epoetin reilly (EPOGEN) Injectable 69309 Unit(s) IV Push <User Schedule>  ferrous    sulfate Liquid 300 milliGRAM(s) Enteral Tube daily  glucagon  Injectable 1 milliGRAM(s) IntraMuscular once  heparin   Injectable 5000 Unit(s) SubCutaneous every 12 hours  hydrALAZINE 100 milliGRAM(s) Oral three times a day  insulin lispro (ADMELOG) corrective regimen sliding scale   SubCutaneous every 6 hours  insulin NPH human recombinant 4 Unit(s) SubCutaneous every 6 hours  pantoprazole  Injectable 40 milliGRAM(s) IV Push every 12 hours  sodium bicarbonate 650 milliGRAM(s) Oral every 8 hours    MEDICATIONS  (PRN):  acetaminophen   Oral Liquid .. 650 milliGRAM(s) Oral every 6 hours PRN Temp greater or equal to 38C (100.4F), Moderate Pain (4 - 6)  dextrose Oral Gel 15 Gram(s) Oral once PRN Blood Glucose LESS THAN 70 milliGRAM(s)/deciliter  hydrALAZINE Injectable 10 milliGRAM(s) IV Push every 8 hours PRN SBP > 180  melatonin 3 milliGRAM(s) Oral at bedtime PRN Insomnia  sodium chloride 0.9% lock flush 10 milliLiter(s) IV Push every 1 hour PRN Pre/post blood products, medications, blood draw, and to maintain line patency      LABS:                        7.9    9.46  )-----------( 429      ( 02 Jun 2024 06:00 )             24.1     06-02    135  |  94<L>  |  50<H>  ----------------------------<  191<H>  4.6   |  27  |  5.26<H>    Ca    8.5      02 Jun 2024 06:00  Phos  3.0     06-02  Mg     2.20     06-02    PT/INR - ( 01 Jun 2024 09:25 )   PT: 12.3 sec;   INR: 1.09 ratio      PTT - ( 02 Jun 2024 06:00 )  PTT:35.4 sec  Urinalysis Basic - ( 02 Jun 2024 06:00 )    Color: x / Appearance: x / SG: x / pH: x  Gluc: 191 mg/dL / Ketone: x  / Bili: x / Urobili: x   Blood: x / Protein: x / Nitrite: x   Leuk Esterase: x / RBC: x / WBC x   Sq Epi: x / Non Sq Epi: x / Bacteria: x    Venous Blood Gas:  05-31 @ 17:15  7.45/39/56/27/87.0  VBG Lactate: 1.5    PAST MEDICAL & SURGICAL HISTORY:  Diabetes    Benign essential HTN    HLD (hyperlipidemia)    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    ESRD on hemodialysis    Arteriovenous fistula    FAMILY HISTORY:  FHx: diabetes mellitus (Father, Aunt)    Social History:    RADIOLOGY:  [ ] Reviewed and interpreted by me    PULMONARY FUNCTION TESTS:    EKG: CHIEF COMPLAINT: Patient is a 73y old  Male who presents with a chief complaint of Unstable angina, abn EKG (02 Jun 2024 09:00)    Interval Events: None reported overnight. Clinically unchanged. No new complaints. Plan to resume Full AC (heparin gtt) today.     REVIEW OF SYSTEMS:  See above  [x] All other systems negative    Mode: standby    OBJECTIVE:  ICU Vital Signs Last 24 Hrs  T(C): 36.4 (02 Jun 2024 06:00), Max: 37 (01 Jun 2024 16:21)  T(F): 97.5 (02 Jun 2024 06:00), Max: 98.6 (01 Jun 2024 16:21)  HR: 60 (02 Jun 2024 09:16) (59 - 80)  BP: 179/57 (02 Jun 2024 06:00) (155/67 - 195/75)  BP(mean): 89 (02 Jun 2024 06:00) (88 - 89)  ABP: --  ABP(mean): --  RR: 17 (02 Jun 2024 07:38) (16 - 24)  SpO2: 100% (02 Jun 2024 09:16) (95% - 100%)    O2 Parameters below as of 02 Jun 2024 09:16  Patient On (Oxygen Delivery Method): tracheostomy collar    Mode: standby    06-01 @ 07:01  -  06-02 @ 07:00  --------------------------------------------------------  IN: 1560 mL / OUT: 0 mL / NET: 1560 mL    CAPILLARY BLOOD GLUCOSE    POCT Blood Glucose.: 194 mg/dL (02 Jun 2024 05:38)    HOSPITAL MEDICATIONS:  MEDICATIONS  (STANDING):  albuterol    0.083% 2.5 milliGRAM(s) Nebulizer every 6 hours  amLODIPine   Tablet 10 milliGRAM(s) Oral daily  atorvastatin 20 milliGRAM(s) Oral at bedtime  carvedilol 25 milliGRAM(s) Oral every 12 hours  chlorhexidine 0.12% Liquid 15 milliLiter(s) Oral Mucosa every 12 hours  chlorhexidine 2% Cloths 1 Application(s) Topical <User Schedule>  dextrose 10% Bolus 125 milliLiter(s) IV Bolus once  dextrose 5%. 1000 milliLiter(s) (100 mL/Hr) IV Continuous <Continuous>  dextrose 5%. 1000 milliLiter(s) (50 mL/Hr) IV Continuous <Continuous>  dextrose 50% Injectable 25 Gram(s) IV Push once  dextrose 50% Injectable 12.5 Gram(s) IV Push once  epoetin reilly (EPOGEN) Injectable 26047 Unit(s) IV Push <User Schedule>  ferrous    sulfate Liquid 300 milliGRAM(s) Enteral Tube daily  glucagon  Injectable 1 milliGRAM(s) IntraMuscular once  heparin   Injectable 5000 Unit(s) SubCutaneous every 12 hours  hydrALAZINE 100 milliGRAM(s) Oral three times a day  insulin lispro (ADMELOG) corrective regimen sliding scale   SubCutaneous every 6 hours  insulin NPH human recombinant 4 Unit(s) SubCutaneous every 6 hours  pantoprazole  Injectable 40 milliGRAM(s) IV Push every 12 hours  sodium bicarbonate 650 milliGRAM(s) Oral every 8 hours    MEDICATIONS  (PRN):  acetaminophen   Oral Liquid .. 650 milliGRAM(s) Oral every 6 hours PRN Temp greater or equal to 38C (100.4F), Moderate Pain (4 - 6)  dextrose Oral Gel 15 Gram(s) Oral once PRN Blood Glucose LESS THAN 70 milliGRAM(s)/deciliter  hydrALAZINE Injectable 10 milliGRAM(s) IV Push every 8 hours PRN SBP > 180  melatonin 3 milliGRAM(s) Oral at bedtime PRN Insomnia  sodium chloride 0.9% lock flush 10 milliLiter(s) IV Push every 1 hour PRN Pre/post blood products, medications, blood draw, and to maintain line patency    PHYSICAL EXAM  General: Laying in bed comfortably, NAD  HEENT: AT/NC, +Trach, area c/d/i, +R IJ Shiley noted, Neck supple, no JVD  HEART: +s1, s2   LUNGS: Non labored breathing. +coarse breath sounds noted  GI: +BS, soft, +PEG, area c/d/i, no peritoneal signs noted    MSK: R AVF w/ palpable thrill, palpable radial pulses b/l,+trace edema b/l lower ext, no swelling   Derm: as per RN assessment sheet   NEURO: Alert and awake, non focal, following commands    LABS:                        7.9    9.46  )-----------( 429      ( 02 Jun 2024 06:00 )             24.1     06-02    135  |  94<L>  |  50<H>  ----------------------------<  191<H>  4.6   |  27  |  5.26<H>    Ca    8.5      02 Jun 2024 06:00  Phos  3.0     06-02  Mg     2.20     06-02    PT/INR - ( 01 Jun 2024 09:25 )   PT: 12.3 sec;   INR: 1.09 ratio      PTT - ( 02 Jun 2024 06:00 )  PTT:35.4 sec  Urinalysis Basic - ( 02 Jun 2024 06:00 )    Color: x / Appearance: x / SG: x / pH: x  Gluc: 191 mg/dL / Ketone: x  / Bili: x / Urobili: x   Blood: x / Protein: x / Nitrite: x   Leuk Esterase: x / RBC: x / WBC x   Sq Epi: x / Non Sq Epi: x / Bacteria: x    Venous Blood Gas:  05-31 @ 17:15  7.45/39/56/27/87.0  VBG Lactate: 1.5    PAST MEDICAL & SURGICAL HISTORY:  Diabetes    Benign essential HTN    HLD (hyperlipidemia)    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    ESRD on hemodialysis    Arteriovenous fistula    FAMILY HISTORY:  FHx: diabetes mellitus (Father, Aunt)    Social History:    RADIOLOGY:  [ ] Reviewed and interpreted by me    PULMONARY FUNCTION TESTS:    EKG:

## 2024-06-02 NOTE — PROCEDURE NOTE - NSPROCDETAILS_GEN_ALL_CORE
location identified, draped/prepped, sterile technique used, needle inserted/introduced/area cleaned in sterile fashion
guidewire recovered/lumen(s) aspirated and flushed/sterile dressing applied/sterile technique, catheter placed/ultrasound guidance with use of sterile gel and probe cove
location identified, draped/prepped, sterile technique used, needle inserted/introduced/positive blood return obtained via catheter/connected to a pressurized flush line/sutured in place/hemostasis with direct pressure, dressing applied/Seldinger technique/all materials/supplies accounted for at end of procedure
guidewire recovered/lumen(s) aspirated and flushed/sterile dressing applied/sterile technique, catheter placed/ultrasound guidance with use of sterile gel and probe cove
location identified, draped/prepped, sterile technique used/blood seen on insertion/dressing applied/flushes easily/secured in place/sterile technique, catheter placed
patient pre-oxygenated, tube inserted, placement confirmed
guidewire recovered/lumen(s) aspirated and flushed/sterile dressing applied/sterile technique, catheter placed/ultrasound guidance with use of sterile gel and probe cove

## 2024-06-02 NOTE — PROGRESS NOTE ADULT - SUBJECTIVE AND OBJECTIVE BOX
Patient known to this provider in Division of Medical Ethics since March 2019 when she refused tracheostomy. This provider covering ICU at [place]. Spoke to patient's son and daughter     Heavenly Hernández [phone]  Norrisjez Fernandez       [phone]      Results of CT scan revealed massive intracerebral bleed. Reviewed Scan via Telehealth as ED Dr Bingham also explained scan  In alpha admission January 24 2020 Patient's living will present that in event of devastating injury patient would not want continued life sustaining treatment    Should neurologic status remain compromised and restoration of function deemed improbable, ethics available for family discussions.    IVANIA Rooney  [phone] Patient is a 73y Male     Patient is a 73y old  Male who presents with a chief complaint of Unstable angina, abn EKG (01 Jun 2024 15:47)      HPI:  73-year-old male past medical history hypertension, ESRD on dialysis Monday Wednesday Friday, diabetes insulin-dependent presents with hiccups patient endorses persistent hiccups for the last 2 days. found to have peaked T waves, a new finding. denies dizziness, N/V, denies CP, palps, abd pain (29 Apr 2024 21:15)      PAST MEDICAL & SURGICAL HISTORY:  Diabetes      Benign essential HTN      HLD (hyperlipidemia)      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      ESRD on hemodialysis      Arteriovenous fistula          MEDICATIONS  (STANDING):  albuterol    0.083% 2.5 milliGRAM(s) Nebulizer every 6 hours  amLODIPine   Tablet 10 milliGRAM(s) Oral daily  atorvastatin 20 milliGRAM(s) Oral at bedtime  carvedilol 25 milliGRAM(s) Oral every 12 hours  chlorhexidine 0.12% Liquid 15 milliLiter(s) Oral Mucosa every 12 hours  chlorhexidine 2% Cloths 1 Application(s) Topical <User Schedule>  dextrose 10% Bolus 125 milliLiter(s) IV Bolus once  dextrose 5%. 1000 milliLiter(s) (100 mL/Hr) IV Continuous <Continuous>  dextrose 5%. 1000 milliLiter(s) (50 mL/Hr) IV Continuous <Continuous>  dextrose 50% Injectable 25 Gram(s) IV Push once  dextrose 50% Injectable 12.5 Gram(s) IV Push once  epoetin reilly (EPOGEN) Injectable 12448 Unit(s) IV Push <User Schedule>  ferrous    sulfate Liquid 300 milliGRAM(s) Enteral Tube daily  glucagon  Injectable 1 milliGRAM(s) IntraMuscular once  heparin   Injectable 5000 Unit(s) SubCutaneous every 12 hours  hydrALAZINE 100 milliGRAM(s) Oral three times a day  insulin lispro (ADMELOG) corrective regimen sliding scale   SubCutaneous every 6 hours  insulin NPH human recombinant 4 Unit(s) SubCutaneous every 6 hours  pantoprazole  Injectable 40 milliGRAM(s) IV Push every 12 hours  sodium bicarbonate 650 milliGRAM(s) Oral every 8 hours      Allergies    No Known Allergies    Intolerances        SOCIAL HISTORY:  Denies ETOh,Smoking,     FAMILY HISTORY:  FHx: diabetes mellitus (Father, Aunt)        REVIEW OF SYSTEMS:    CONSTITUTIONAL: No weakness, fevers or chills  EYES/ENT: No visual changes;  No vertigo or throat pain   NECK: No pain or stiffness  RESPIRATORY: No cough, wheezing, hemoptysis; No shortness of breath  CARDIOVASCULAR: No chest pain or palpitations  GASTROINTESTINAL: No abdominal or epigastric pain. No nausea, vomiting, or hematemesis; No diarrhea or constipation. No melena or hematochezia.  GENITOURINARY: No dysuria, frequency or hematuria  NEUROLOGICAL: No numbness or weakness  SKIN: No itching, burning, rashes, or lesions   All other review of systems is negative unless indicated above.    VITAL:  T(C): , Max: 37 (06-01-24 @ 16:21)  T(F): , Max: 98.6 (06-01-24 @ 16:21)  HR: 65 (06-02-24 @ 06:00)  BP: 179/57 (06-02-24 @ 06:00)  BP(mean): 89 (06-02-24 @ 06:00)  RR: 18 (06-02-24 @ 06:00)  SpO2: 100% (06-02-24 @ 06:00)  Wt(kg): --    I and O's:    05-31 @ 07:01  -  06-01 @ 07:00  --------------------------------------------------------  IN: 1600 mL / OUT: 2400 mL / NET: -800 mL    06-01 @ 07:01  -  06-02 @ 07:00  --------------------------------------------------------  IN: 1560 mL / OUT: 0 mL / NET: 1560 mL          PHYSICAL EXAM:    Constitutional: NAD  HEENT: PERRLA,   Neck: No JVD  Respiratory: CTA B/L  Cardiovascular: S1 and S2  Gastrointestinal: BS+, soft, NT/ND  Extremities: No peripheral edema  Neurological: A/O x 3, no focal deficits  Psychiatric: Normal mood, normal affect  : No Abbasi  Skin: No rashes  Access: Not applicable  Back: No CVA tenderness    LABS:                        7.9    9.46  )-----------( 429      ( 02 Jun 2024 06:00 )             24.1     06-02    135  |  94<L>  |  50<H>  ----------------------------<  191<H>  4.6   |  27  |  5.26<H>    Ca    8.5      02 Jun 2024 06:00  Phos  3.0     06-02  Mg     2.20     06-02            RADIOLOGY & ADDITIONAL STUDIES:

## 2024-06-02 NOTE — PROGRESS NOTE ADULT - SUBJECTIVE AND OBJECTIVE BOX
Cardiovascular Disease Progress Note  DATE OF SERVICE: 24 @ 09:00    Overnight events: No acute events overnight.    The patient is in no distress on trach collar.   Otherwise review of systems negative    Objective Findings:  T(C): 36.4 (24 @ 06:00), Max: 37 (24 @ 16:21)  HR: 59 (24 @ 07:38) (59 - 80)  BP: 179/57 (24 @ 06:00) (155/67 - 195/75)  RR: 17 (24 @ 07:38) (16 - 24)  SpO2: 100% (24 @ 07:38) (95% - 100%)  Wt(kg): --  Daily     Daily Weight in k.3 (2024 23:48)      Physical Exam:  Gen: NAD; Patient resting comfortably  HEENT:  Normocephalic/ atraumatic  CV: RRR, normal S1 + S2, no m/r/g  Lungs:  Normal respiratory effort; fair air entry to auscultation bilaterally  Abd: soft, non-tender; bowel sounds present  Ext:   warm and well perfused    Telemetry: n/a    Laboratory Data:                        7.9    9.46  )-----------( 429      ( 2024 06:00 )             24.1     06-    135  |  94<L>  |  50<H>  ----------------------------<  191<H>  4.6   |  27  |  5.26<H>    Ca    8.5      2024 06:00  Phos  3.0       Mg     2.20     02      PT/INR - ( 2024 09:25 )   PT: 12.3 sec;   INR: 1.09 ratio         PTT - ( 2024 06:00 )  PTT:35.4 sec          Inpatient Medications:  MEDICATIONS  (STANDING):  albuterol    0.083% 2.5 milliGRAM(s) Nebulizer every 6 hours  amLODIPine   Tablet 10 milliGRAM(s) Oral daily  atorvastatin 20 milliGRAM(s) Oral at bedtime  carvedilol 25 milliGRAM(s) Oral every 12 hours  chlorhexidine 0.12% Liquid 15 milliLiter(s) Oral Mucosa every 12 hours  chlorhexidine 2% Cloths 1 Application(s) Topical <User Schedule>  dextrose 10% Bolus 125 milliLiter(s) IV Bolus once  dextrose 5%. 1000 milliLiter(s) (100 mL/Hr) IV Continuous <Continuous>  dextrose 5%. 1000 milliLiter(s) (50 mL/Hr) IV Continuous <Continuous>  dextrose 50% Injectable 25 Gram(s) IV Push once  dextrose 50% Injectable 12.5 Gram(s) IV Push once  epoetin reilly (EPOGEN) Injectable 70545 Unit(s) IV Push <User Schedule>  ferrous    sulfate Liquid 300 milliGRAM(s) Enteral Tube daily  glucagon  Injectable 1 milliGRAM(s) IntraMuscular once  heparin   Injectable 5000 Unit(s) SubCutaneous every 12 hours  hydrALAZINE 100 milliGRAM(s) Oral three times a day  insulin lispro (ADMELOG) corrective regimen sliding scale   SubCutaneous every 6 hours  insulin NPH human recombinant 4 Unit(s) SubCutaneous every 6 hours  pantoprazole  Injectable 40 milliGRAM(s) IV Push every 12 hours  sodium bicarbonate 650 milliGRAM(s) Oral every 8 hours      Assessment: 73 year old man with HTN, HLD, T2DM on insulin, and ESRD on HD presents with supply demand ischemia and angina.    Plan of Care:    #Type II myocardial infarction-  Secondary to distributive shock earlier on admission.   The repeat echo shows no new wall motion abnormality.   I would not pursue cath at this time given chronic respiratory failure s/p trach .   The patient is optimized from a cardiac standpoint to proceed with vascular HD access intervention.   - Continue Coreg throughout the jin-operative period.     #HTN-  BP is labile.  Amlodipine and Coreg recently up titrated.  I would continue the current regimen as up titration is yet to reach steady state.           #ESRD-  HD as per renal     #DVT   - R common Iliac DVT  Recent oozing noted at HD site.   Management as per RCU.            Over 55 minutes spent on total encounter; more than 50% of the visit was spent counseling and/or coordinating care by the attending physician.      Geovani Bravo MD [facility]  Cardiovascular Disease  [phone]

## 2024-06-02 NOTE — PROVIDER CONTACT NOTE (OTHER) - SITUATION
Patient currently on dialysis, AOx0 and confused. Venous needle infiltrated requiring to discontinue treatment.
High blood pressure /63
Patient is hypertensive post dose of hydralazine
Patient coughed up blood-streaked sputum and primary RN suctioned small amount of clot from his tracheostomy which eventually cleared out
Pt /51 and HR 59. Pt currently ordered for coreg and coreg parameters are to hold for HR below 65.
patient has low BP 84/47, patient is on levo
Unable to complete planned Hemodialysis treatment due to malfunction of venous line of non-tunneled catheter.
Patient blood presure dropped during HD , unable to remove the desired fluid , patient is fighting on the vent and catheter blood flow is low as the arterial and venous pressure elevated
Patient with asymptomatic bradycardia
Pt /47 and HR of 60. Coreg parameter is to hold for HR below 65
Pt /56 while getting dialysis
Unable to do HD due to venous access infiltration.

## 2024-06-02 NOTE — PROGRESS NOTE ADULT - ASSESSMENT
73-year-old male past medical history hypertension, ESRD on dialysis Monday Wednesday Friday, diabetes insulin-dependent presents with hiccups patient endorses persistent hiccups for the last 2 days.   found to have peaked T waves, a new finding. denied dizziness, N/V, denies CP, palps, abd pain  Upon admission seen by CArd, renal and GI  found to have a distended GB prob 2/2 gastroparesis, was started on Reglan  has received 4 doses of baclofen since 4/30 2/2 hiccups, last dose on 5/1 at 5 am  had received Haldol for agitation at 11 pm on 4/30, AMS observed in pm of 5/1  AMS ongoing, RRT x 2 called  now transferred to MICU for encephalopathy requiring  airway protection on 5/2,  intubated for airway protection, was on  propofol and pressors. now off  toxicology consulted upon dx of encephalopathy, not impressed AMS 2/2 Haldol nor baclofen . AMS was 2/2 acute CVA   HD was not done timely until femoral shiley was placed 2/2 clotted RUE AVF. now removed and RIJ shiley placed on 5/3  has been nonverbal since pm 5/1.     MR brain 5/6 c/w L pontine and L cerebellar acute infarcts, no hemorrhage . as per neuro: embolic in nature.   encephalopathy probably 2/2 acute CVA, now follows commands appropriately off sedation.   no SZ focus on EEG,   off CRRT,  HD resumed as per renal.   remains intubated, now trached  off keppra  AC resumed, was initially on  Heparin drip for R common iliac DVT, now held 2/2 GI bleed  completed 5 days of empiric ZOSYN on 5/7, shortly after became septic again after an extubation trial. bacteremia w B. cereus, on Vanc through 5/20 as per ID    s/p trache placement by pulm,   IR unable to find a window for G-J tube. PEG placed by surgery 5/17, resume feeds when cleared by surgery  HD via R IJ.  Tmax overnight( 5/18)  101, cxr c/w RLL PNA, now on Zosyn, blood Cx s sent. remains on Vanco for B. cereus bacteremia   leukocytosis resolved  EKG changes on 5/3 c/w NSTEMI loaded w DAPT , was  on Heparin drip x 48 hrs. rpt TTE is unchanged  ID, Neuro , renal, cardiology following.  clotted RUE AVF.  fistulogram was cancelled on 5/25 2/2 fever  HD via [facility], replaced 6/1  LP done, no sign of meningitis.   NEUROLOGY     - CTH without acute findings   - LP done, no acute findings  - MR brain w acute infarcts: L talya and L cerebellum, nonhemorrhagic  - no Sz focus on EEG    CARDIOVASCULAR   - ptn never had CP. just peaked T waves. was awaiting ischemic study w nucl stress test  - TTE showing EF 72%, rpt unchanged  - EKG changes on 5/3, loaded w DAPT  - BP is rising. on Norvasc, Coreg and hydralazine, max doses. as per renal add Imdur to improve BP    GI  - PEG placed, npo w tube feeds  - Gastroparesis       RENAL   -  HD as per renal  - kolby replaced 6/1  - fistulogram planning next week      INFECTIOUS DISEASE     completed a course of Zosyn, then became septic, bacteremic a B. cereus, completed 3 days of Meropenem, completed vanc through 5/21  on Zosyn since 5/18 for RLL PNA, afebrile, completed 5/23  recurrent fever 5/25, ZOsyn resumed and stopped on 5/28. afebrile since    ENDOCRINE   - DM  - cw ISS    DVT  - RLE , initially on Heparin drip, now off 2/2 GI bleed. does he need an IVC filter    GOC:  Full code

## 2024-06-02 NOTE — PROGRESS NOTE ADULT - ASSESSMENT
74 YO M with PMHx of ESRD on HD MWF, HTN, and 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 Baclofen started and course complicated by AMS second to baclofen toxicity requiring intubation and MICU transfer. Course further complicated by LEFT pontine and cerebellar stroke, R AVF stenosis, aspiration and B Cereus bacteremia and RLE DVT. s/p trach and transferred to RCU 5/16 and course complicated by intermittent fever spikes with likely aspiration citrobacter PNA, AOCD, and GIB vs Fe stools.     NEUROLOGY  # AMS   - MRI HEAD (5/6) with punctate foci in the left talya and left cerebellum with concern for acute infarct.   - MRA HEAD and NECK (5/6) with no large vessel occlusion or stenosis.  - Continue on aspirin and hold DAPT for now pending procedures   - Continue on Lipitor for medical management     PSYCH   # Depression   - Concern for depression with questionable SI   - CO started, however formal discussion and evaluation with no true SI   - Supportive care and encouragement QD    CARDIOVASCULAR  # CP with NSTEMI < demand ischemia with HTN   - Admitted for CP and HTN with peaked T WAVES and ECG with ST deviation on admission   - Troponins elevated on admission with negative CKMB   - TTE (4/30) with EF 72, normal LVRVSF, and no regional wall motion abnormalities   - Case discussed with cardiology and no acute need for cath.   - DAPT loaded and continue on medical management with ASA and lipitor     # Septic vs vasoplegic shock  # Hx of HTN   - Home BP medications held and pressors weaned off   - Continue on coreg 25 (increased 6/1), hydralazine 100 TID and norvasc 10mg (increased 5/31)   - Monitor blood pressures with hydral 10mg IVP Q8H PRN   - IF remains hypertensive then add Imdur     RESPIRATORY  # Respiratory failure   - AMS noted with baclofen toxicity requiring intubation   - Attempted extubation in MICU however course complicated by aspiration and prolonged course and now s/p tracheostomy with IP on 5/14  - Continue on vent 12/500/5/30 and weaned to TC ATC   - Secretions thick and will hold HTS as with strong baseline cough and atrovent to prevent further thickening of secretions   - Continue on albuterol   - Continue on TC ATC (5/30 - ) and attempt PMV when able    GI  # Dysphagia   - s/p surgical PEG 5/17   - Failed green dye on 6/1 and continue on tube feeds via PEG   - Reattempt SS when able to tolerate PMV    # GIB  - Noted with several episodes of black stool with drop in Hgb (5/26 overnight) requiring PRBC with appropriate response  - Case discussed with GI, questionable anemia from AOCD with ESRD and black stool likely from iron, and no plan for scope at this time  - Continue on PPI and monitor stools    RENAL  # ESRD on HD MWF with R BCF  # Fistula stenosis   - VA AVF (5/2) with Right radiocephalic arteriovenous fistula has no hemodynamically significant stenosis present at the anastomotic site ( cm/sec, EDV 49 cm/sec). The usable segment is partially thrombosed with minimal patency.  - Required R FEMORAL line on medicine, then removed on 5/2, and replaced with R IJ SHILEY on 5/3 for CRRT then converted back to iHD MWF   - R IJ SHILEY removed on 5/10 second to bacteremia and replaced on 5/13  - R IJ SHILEY removed 5/27 given intermittent fevers    - Blood cultures negative and plan to replaced SHILEY on 6/1   - Vein mapping with no adequate veins in UE for conduit (all <2 mm and/or thrombosed) and pending possible revision of RUE AVF.   Plan for fistulogram and fistula repair next week   - Continue on HD MWF per Renal   - Continue on HCO3 650 tabs     # Hyperphosphatemia   - Continue on Renvela 1600 however phos corrects well with HD   - Monitor off Renvela     INFECTIOUS DISEASE  # Aspiration concern   - Recurrent fever spike and CXR with RLL opacity   - BCx (5/25 and 5/30) negative   - SCx (5/25) negative   - SCx (5/30) with citrobacter   - s/p Zosyn empirically (5/18 - 5/28) with intermittent fever spikes. Per discussion with ID will hold further ABX pending SCx sensitivities. Zosyn should cover for zosyn and currently with improvement in respiratory status.     # B Cereus Bacteremia   - Course complicated by fevers and aspiration event   - MRSA (5/1) negative   - BCx (5/2) negative   - SCx (5/4) and BAL (5/12) negative   - BCx (5/10) with bacillus cereus and cleared on (5/12).   - Case discussed with ID and s/p Vancomycin through 5/21 x 10 day course.   - Monitor off Abx for now    HEME  # AOCD with ESRD   - Anemia panel with AOCD and low % sat   - EPO 10K continued on TIW   - Ferrous supplement added   - - Transfused on 5/16 and 5/26 and will monitor HH     VASCULAR   # RLE DVT   - CT AP (5/12) with nonocclusive RIGHT common iliac vein deep venous thrombosis and case discussed with IR with no plans for IVC filter.   - Initially started on a Heparin GTT with course c/b questionable GIB given dark tarry stools, however more likely second to iron tabs.   - Challenge on HSQ with stable HH.   - Resume Heparin  GTT tomorrow vs tonight if oozing from shiley resolves     ENDOCRINE  # IDDM2 A1C 6.9  - c/w NPH 4U Q6 and ISS  - Monitor and adjust insulin based on FS     SKIN  - R FEMORAL (5/1-5/2)   - R IJ SHILEY (5/3-5/10)   - R IJ SHILEY (5/13 - 5/27)   - R IJ SHILEY (6/1 - )     ETHICS/ GOC    - FULL CODE     DISPO - PMR recc KIMI, however if strength improves while in house then plan for RPT PMR evaluation for acute rehab.     ***********************  6/1-shiley placed then HD-some oozing   72 YO M with PMHx of ESRD on HD MWF, HTN, and 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 Baclofen started and course complicated by AMS second to baclofen toxicity requiring intubation and MICU transfer. Course further complicated by LEFT pontine and cerebellar stroke, R AVF stenosis, aspiration and B Cereus bacteremia and RLE DVT. s/p trach and transferred to RCU 5/16 and course complicated by intermittent fever spikes with likely aspiration citrobacter PNA, AOCD, and GIB vs Fe stools.     NEUROLOGY  # AMS   - MRI HEAD (5/6) with punctate foci in the left talya and left cerebellum with concern for acute infarct.   - MRA HEAD and NECK (5/6) with no large vessel occlusion or stenosis.  - Continue on aspirin and hold DAPT for now pending procedures   - Continue on Lipitor for medical management     PSYCH   # Depression   - Concern for depression with questionable SI   - CO started, however formal discussion and evaluation with no true SI   - Supportive care and encouragement QD    CARDIOVASCULAR  # CP with NSTEMI < demand ischemia with HTN   - Admitted for CP and HTN with peaked T WAVES and ECG with ST deviation on admission   - Troponins elevated on admission with negative CKMB   - TTE (4/30) with EF 72, normal LVRVSF, and no regional wall motion abnormalities   - Case discussed with cardiology and no acute need for cath.   - DAPT loaded and continue on medical management with ASA and lipitor     # Septic vs vasoplegic shock  # Hx of HTN   - Home BP medications held and pressors weaned off   - Continue on coreg 25 (increased 6/1), hydralazine 100 TID and norvasc 10mg (increased 5/31)   - Monitor blood pressures with hydral 10mg IVP Q8H PRN   - IF remains hypertensive then add Imdur     RESPIRATORY  # Respiratory failure   - AMS noted with baclofen toxicity requiring intubation   - Attempted extubation in MICU however course complicated by aspiration and prolonged course and now s/p tracheostomy with IP on 5/14  - Continue on vent 12/500/5/30 and weaned to TC ATC   - Secretions thick and will hold HTS as with strong baseline cough and atrovent to prevent further thickening of secretions   - Continue on albuterol   - Continue on TC ATC (5/30 - ) and attempt PMV when able    GI  # Dysphagia   - s/p surgical PEG 5/17   - Failed green dye on 6/1 and continue on tube feeds via PEG   - Reattempt SS when able to tolerate PMV    # GIB  - Noted with several episodes of black stool with drop in Hgb (5/26 overnight) requiring PRBC with appropriate response  - Case discussed with GI, questionable anemia from AOCD with ESRD and black stool likely from iron, and no plan for scope at this time  - Continue on PPI and monitor stools    RENAL  # ESRD on HD MWF with R BCF  # Fistula stenosis   - VA AVF (5/2) with Right radiocephalic arteriovenous fistula has no hemodynamically significant stenosis present at the anastomotic site ( cm/sec, EDV 49 cm/sec). The usable segment is partially thrombosed with minimal patency.  - Required R FEMORAL line on medicine, then removed on 5/2, and replaced with R IJ SHILEY on 5/3 for CRRT then converted back to iHD MWF   - R IJ SHILEY removed on 5/10 second to bacteremia and replaced on 5/13  - R IJ SHILEY removed 5/27 given intermittent fevers    - Blood cultures negative and plan to replaced SHILEY on 6/1   - Vein mapping with no adequate veins in UE for conduit (all <2 mm and/or thrombosed) and pending possible revision of RUE AVF.   Plan for fistulogram and fistula repair next week   - Continue on HD MWF per Renal   - Continue on HCO3 650 tabs     # Hyperphosphatemia   - Continue on Renvela 1600 however phos corrects well with HD   - Monitor off Renvela     INFECTIOUS DISEASE  # Aspiration concern   - Recurrent fever spike and CXR with RLL opacity   - BCx (5/25 and 5/30) negative   - SCx (5/25) negative   - SCx (5/30) with citrobacter   - s/p Zosyn empirically (5/18 - 5/28) with intermittent fever spikes. Per discussion with ID will hold further ABX pending SCx sensitivities. Zosyn should cover for zosyn and currently with improvement in respiratory status.     # B Cereus Bacteremia   - Course complicated by fevers and aspiration event   - MRSA (5/1) negative   - BCx (5/2) negative   - SCx (5/4) and BAL (5/12) negative   - BCx (5/10) with bacillus cereus and cleared on (5/12).   - Case discussed with ID and s/p Vancomycin through 5/21 x 10 day course.   - Monitor off Abx for now    HEME  # AOCD with ESRD   - Anemia panel with AOCD and low % sat   - EPO 10K continued on TIW   - Ferrous supplement added   - - Transfused on 5/16 and 5/26 and will monitor HH     VASCULAR   # RLE DVT   - CT AP (5/12) with nonocclusive RIGHT common iliac vein deep venous thrombosis and case discussed with IR with no plans for IVC filter.   - Initially started on a Heparin GTT with course c/b questionable GIB given dark tarry stools, however more likely second to iron tabs.   - Challenge on HSQ with stable HH.   - Resume Heparin  GTT tomorrow vs tonight if oozing from shiley resolves     ENDOCRINE  # IDDM2 A1C 6.9  - c/w NPH 4U Q6 and ISS  - Monitor and adjust insulin based on FS     SKIN  - R FEMORAL (5/1-5/2)   - R IJ SHILEY (5/3-5/10)   - R IJ SHILEY (5/13 - 5/27)   - R IJ SHILEY (6/1 - )     ETHICS/ GOC    - FULL CODE     DISPO - PMR recc KIMI, however if strength improves while in house then plan for RPT PMR evaluation for acute rehab.     ***********************  6/1-shiley placed then HD-some oozing  6/2-quiet o/n   ASSESSMENT   74 YO M with PMHx of ESRD on HD MWF, HTN, and 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 Baclofen started and course complicated by AMS second to baclofen toxicity requiring intubation and MICU transfer. Course further complicated by LEFT pontine and cerebellar stroke, R AVF stenosis, aspiration and B Cereus bacteremia and RLE DVT. s/p trach and transferred to RCU 5/16 and course complicated by intermittent fever spikes with likely aspiration citrobacter PNA, AOCD, and GIB vs Fe stools.     PLAN  NEUROLOGY  # AMS   - MRI HEAD (5/6) with punctate foci in the left talya and left cerebellum with concern for acute infarct.   - MRA HEAD and NECK (5/6) with no large vessel occlusion or stenosis.  - Continue on aspirin and hold DAPT for now pending procedures   - Continue on Lipitor for medical management     PSYCH   # Depression   - Concern for depression with questionable SI   - CO started, however formal discussion and evaluation with no true SI   - Supportive care and encouragement QD    CARDIOVASCULAR  # CP with NSTEMI < demand ischemia with HTN   - Admitted for CP and HTN with peaked T WAVES and ECG with ST deviation on admission   - Troponins elevated on admission with negative CKMB   - TTE (4/30) with EF 72, normal LVRVSF, and no regional wall motion abnormalities   - Case discussed with cardiology and no acute need for cath.     # Septic vs vasoplegic shock  # Hx of HTN   - Home BP medications held and pressors weaned off   - Continue on coreg 25 (increased 6/1), hydralazine 100 TID and Norvasc 10mg (increased 5/31)   - Monitor blood pressures with Hydralazine 10mg IVP Q8H PRN   - IF remains hypertensive then add Imdur     RESPIRATORY  # Respiratory failure   - AMS noted with baclofen toxicity requiring intubation   - Attempted extubation in MICU however course complicated by aspiration and prolonged course and now s/p tracheostomy with IP on 5/14  - Continue on vent 12/500/5/30 and weaned to TC ATC   - Secretions thick and will hold HTS as with strong baseline cough and atrovent to prevent further thickening of secretions   - Continue on Albuterol   - Continue on TC ATC (5/30 - ) and attempt PMV when able    GI  # Dysphagia   - s/p surgical PEG 5/17   - Failed green dye on 6/1 and continue on tube feeds via PEG   - Reattempt SS when able to tolerate PMV    # GIB  - Noted with several episodes of black stool with drop in Hgb (5/26 overnight) requiring PRBC with appropriate response  - Case discussed with GI, questionable anemia from AOCD with ESRD and black stool likely from iron, and no plan for scope at this time  - Continue on PPI and monitor stools  - Will continue to monitor closely specially after resuming Full AC with Heparin gtt     RENAL  # ESRD on HD MWF with R BCF  # Fistula stenosis   - VA AVF (5/2) with Right radiocephalic arteriovenous fistula has no hemodynamically significant stenosis present at the anastomotic site ( cm/sec, EDV 49 cm/sec). The usable segment is partially thrombosed with minimal patency.  - Required R FEMORAL line on medicine, then removed on 5/2, and replaced with R IJ SHILEY on 5/3 for CRRT then converted back to iHD MWF   - R IJ SHILEY removed on 5/10 second to bacteremia and replaced on 5/13  - R IJ SHILEY removed 5/27 given intermittent fevers    - Blood cultures negative and plan to replaced SHILEY on 6/1   - Vein mapping with no adequate veins in UE for conduit (all <2 mm and/or thrombosed) and pending possible revision of RUE AVF.   - Plan for fistulogram and fistula repair next week   - Continue on HD MWF per Renal   - Continue on HCO3 650 tabs     # Hyperphosphatemia   - Continue on Renvela 1600 however phos corrects well with HD   - Monitor off Renvela     INFECTIOUS DISEASE  # Aspiration concern   - Recurrent fever spike and CXR with RLL opacity   - BCx (5/25 and 5/30) negative   - SCx (5/25) negative   - SCx (5/30) with citrobacter   - s/p Zosyn empirically (5/18 - 5/28) with intermittent fever spikes.   - Sputum cx 5/30: Moderate citrobacter koseri - Will follow with ID for need for treatment??    # B Cereus Bacteremia   - Course complicated by fevers and aspiration event   - MRSA (5/1) negative   - BCx (5/2) negative   - SCx (5/4) and BAL (5/12) negative   - BCx (5/10) with bacillus cereus and cleared on (5/12).   - Case discussed with ID and s/p Vancomycin through 5/21 x 10 day course.   - Blood cx 5/30: NGTD at 48hrs  - Monitor off Abx for now    HEME  # AOCD with ESRD   - Anemia panel with AOCD and low % sat   - EPO 10K continued on TIW   - Ferrous supplement added   - Transfused on 5/16 and 5/26 and will monitor HH     VASCULAR   # RLE DVT   - CT AP (5/12) with nonocclusive RIGHT common iliac vein deep venous thrombosis and case discussed with IR with no plans for IVC filter.   - Initially started on a Heparin GTT with course c/b questionable GIB given dark tarry stools, however more likely second to iron tabs.   - Challenge on HSQ with stable HH.   - Plan to resume Heparin  GTT today - no bleeding noted around Shiley today      ENDOCRINE  # IDDM2 A1C 6.9  - c/w NPH 4U Q6 and ISS  - Monitor and adjust insulin based on FS     SKIN  - R FEMORAL (5/1-5/2)   - R IJ SHILEY (5/3-5/10)   - R IJ SHILEY (5/13 - 5/27)   - R IJ SHILEY (6/1 - )     ETHICS/ GOC    - FULL CODE     DISPO - PMR recc KIMI, however if strength improves while in house then plan for RPT PMR evaluation for acute rehab.     ***********************  6/1-shiley placed then HD-some oozing  6/2-quiet o/n

## 2024-06-02 NOTE — PROCEDURE NOTE - NSSITEPREP_SKIN_A_CORE
chlorhexidine
chlorhexidine
chlorhexidine/povidone iodine (if allergic to chlorhexidine)
chlorhexidine
alcohol/chlorhexidine/Adherence to aseptic technique: hand hygiene prior to donning barriers (gown, gloves), don cap and mask, sterile drape over patient

## 2024-06-02 NOTE — PROGRESS NOTE ADULT - SUBJECTIVE AND OBJECTIVE BOX
JIMMY BLAND  73y  Patient is a 73y old  Male who presents with a chief complaint of Unstable angina, abn EKG (2024 09:17)    HPI:  ESRD on HD.    HEALTH ISSUES - PROBLEM Dx:        MEDICATIONS  (STANDING):  albuterol    0.083% 2.5 milliGRAM(s) Nebulizer every 6 hours  amLODIPine   Tablet 10 milliGRAM(s) Oral daily  atorvastatin 20 milliGRAM(s) Oral at bedtime  carvedilol 25 milliGRAM(s) Oral every 12 hours  chlorhexidine 0.12% Liquid 15 milliLiter(s) Oral Mucosa every 12 hours  chlorhexidine 2% Cloths 1 Application(s) Topical <User Schedule>  dextrose 10% Bolus 125 milliLiter(s) IV Bolus once  dextrose 5%. 1000 milliLiter(s) (100 mL/Hr) IV Continuous <Continuous>  dextrose 5%. 1000 milliLiter(s) (50 mL/Hr) IV Continuous <Continuous>  dextrose 50% Injectable 25 Gram(s) IV Push once  dextrose 50% Injectable 12.5 Gram(s) IV Push once  epoetin reilly (EPOGEN) Injectable 72408 Unit(s) IV Push <User Schedule>  ferrous    sulfate Liquid 300 milliGRAM(s) Enteral Tube daily  glucagon  Injectable 1 milliGRAM(s) IntraMuscular once  heparin  Infusion.  Unit(s)/Hr (10 mL/Hr) IV Continuous <Continuous>  hydrALAZINE 100 milliGRAM(s) Oral three times a day  insulin lispro (ADMELOG) corrective regimen sliding scale   SubCutaneous every 6 hours  insulin NPH human recombinant 4 Unit(s) SubCutaneous every 6 hours  pantoprazole  Injectable 40 milliGRAM(s) IV Push every 12 hours  sodium bicarbonate 650 milliGRAM(s) Oral every 8 hours    MEDICATIONS  (PRN):  acetaminophen   Oral Liquid .. 650 milliGRAM(s) Oral every 6 hours PRN Temp greater or equal to 38C (100.4F), Moderate Pain (4 - 6)  dextrose Oral Gel 15 Gram(s) Oral once PRN Blood Glucose LESS THAN 70 milliGRAM(s)/deciliter  heparin   Injectable 4000 Unit(s) IV Push every 6 hours PRN For aPTT less than 40  heparin   Injectable 2000 Unit(s) IV Push every 6 hours PRN For aPTT between 40 - 57  hydrALAZINE Injectable 10 milliGRAM(s) IV Push every 8 hours PRN SBP > 180  melatonin 3 milliGRAM(s) Oral at bedtime PRN Insomnia  sodium chloride 0.9% lock flush 10 milliLiter(s) IV Push every 1 hour PRN Pre/post blood products, medications, blood draw, and to maintain line patency    Vital Signs Last 24 Hrs  T(C): 36.9 (2024 15:21), Max: 36.9 (2024 15:21)  T(F): 98.4 (2024 15:21), Max: 98.4 (2024 15:21)  HR: 67 (2024 15:58) (59 - 77)  BP: 181/63 (2024 15:21) (168/54 - 181/63)  BP(mean): 89 (2024 06:00) (88 - 89)  RR: 18 (2024 15:) (16 - 24)  SpO2: 99% (2024 15:58) (97% - 100%)    Parameters below as of 2024 15:58  Patient On (Oxygen Delivery Method): tracheostomy collar      Daily     Daily Weight in k.3 (2024 23:48)    PHYSICAL EXAM:  Constitutional: He appears comfortable and not distressed. Not diaphoretic.    Neck:  The thyroid is normal. Trachea is midline.     Respiratory: The lungs are clear to auscultation. No dullness and expansion is normal.    Cardiovascular: S1 and S2 are normal. No mummurs, rubs or gallops are present.    Gastrointestinal: The abdomen is soft. No tenderness is present. No masses are present. Bowel sounds are normal.    Genitourinary: The bladder is not distended. No CVA tenderness is present.    Extremities: No edema is noted. No deformities are present.    Neurological: Cognition is normal. Tone, power and sensation are normal. Gait is steady.    Skin: No leasions are seen  or palpated.    Lymph Nodes: No lymphadenopathy is present.    Psychiatric: Mood is appropriate. No hallucinations or flight of ideas are noted.                              7.9    9.46  )-----------( 429      ( 2024 06:00 )             24.1     06-02    135  |  94<L>  |  50<H>  ----------------------------<  191<H>  4.6   |  27  |  5.26<H>    Ca    8.5      2024 06:00  Phos  3.0       Mg     2.20

## 2024-06-02 NOTE — PROVIDER CONTACT NOTE (OTHER) - REASON
Hypertension
Unable to do HD
Bradycardia
Venous needle infiltrated (Dialysis)
Pt /56
early termination of treatment
Blood in sputum
Coreg Administration
Medicatin parameters
Unable to complete scheduled HD treatment.
low bp 84/47, patient on levo
Hypertension

## 2024-06-02 NOTE — PROGRESS NOTE ADULT - NS ATTEND AMEND GEN_ALL_CORE FT
as above:  73M PMH ESRD on HD, HTN, and DM2 presented to [facility] on 4/29/24 with chest pressure and hiccups, admitted 2/2 concern for demand ischemia with hospital course c/b baclofen toxicity and acute ischemic CVA left talya/cerebellum c/b acute hypoxemic and hypercapnic respiratory failure s/p ETT intubation/MV with failure to wean from ventilator s/p tracheostomy. Hospital course further complicated by aspiration and B cereus bacteremia and RLE DVT. Now transferred to RCU 5/16 for further management.     #Neuro: resolved acute encephalopathy. MRI head 5/6 with L talya and L cerebellum ischemic infarcts with no vessel occlusion/stenosis on MRA. Will restart aspirin if he tolerates Heparin gtt. Continue statin  #CV: HD stable, increase amlodipine to 10 mg qd. Continue carvedilol and hydralazine  #Pulm: continue TC ATC, goal SpO2>90%. s/p ABG on TC. ACT with albuterol only (minus atrovent + 3%NaCl nebs)  #GI: improving melena, H/H stable, tolerating HSQ. Continue PPI BID. Continue PEG feeds. S&S [id number]  #Renal: ESRD, plan for IR-placement of Shiley catheter for HD 5/31. If fevers resolve and blood cultures are negative, will discuss with vascular regarding fistulogram/thrombectomy next week  #ID: no further fevers since 100.3 axillary on 5/30. Endotracheal culture with Citrobacter koseri, sensitivities pending. Follow-up ID regarding need for antibiotics. Repeat blood cultures pending. Recently completed course of Zosyn 5/28 for aspiration pneumonia and B. cereus bacteremia  #Heme: non-occlusive R common iliac DVT on CT A/P, not seen on RLE duplex. Start Heparin gtt given that he is tolerating HSQ, H/H stable. If tolerates a/c, then can consider restarting aspirin  #Dispo: full code, discussed with patient and wife at bedside. PM&R eval for acute rehab when ready for discharge (hopefully by next week)    Luis Alfredo Child MD-Pulmonary   [phone] as above: no new issues  73M PMH ESRD on HD, HTN, and DM2 presented to [facility] on 4/29/24 with chest pressure and hiccups, admitted 2/2 concern for demand ischemia with hospital course c/b baclofen toxicity and acute ischemic CVA left talya/cerebellum c/b acute hypoxemic and hypercapnic respiratory failure s/p ETT intubation/MV with failure to wean from ventilator s/p tracheostomy. Hospital course further complicated by aspiration and B cereus bacteremia and RLE DVT. Now transferred to RCU 5/16 for further management.     #Neuro: resolved acute encephalopathy. MRI head 5/6 with L talya and L cerebellum ischemic infarcts with no vessel occlusion/stenosis on MRA. Will restart aspirin if he tolerates Heparin gtt (from SC heparin). Continue statin  #CV: HD stable, increase amlodipine to 10 mg qd. Continue carvedilol and hydralazine  #Pulm: continue TC ATC, goal SpO2>90%. s/p ABG on TC. ACT with albuterol only (minus atrovent + 3%NaCl nebs)  #GI: improving melena, H/H stable, tolerating HSQ. Continue PPI BID. Continue PEG feeds. S&S [id number]  #Renal: ESRD, plan for IR-placement of Shiley catheter for HD 5/31. If fevers resolve and blood cultures are negative, will discuss with vascular regarding fistulogram/thrombectomy next week  #ID: no further fevers since 100.3 axillary on 5/30. Endotracheal culture with Citrobacter koseri, sensitivities pending. Follow-up ID regarding need for antibiotics. Repeat blood cultures pending. Recently completed course of Zosyn 5/28 for aspiration pneumonia and B. cereus bacteremia  #Heme: non-occlusive R common iliac DVT on CT A/P, not seen on RLE duplex. Start Heparin gtt 6/2-given that he is tolerating HSQ, H/H stable. If tolerates a/c, then can consider restarting aspirin  #Dispo: full code, discussed with patient and wife at bedside. PM&R eval for acute rehab when ready for discharge (hopefully by next week)    Luis Alfredo Child MD-Pulmonary   [phone]

## 2024-06-03 ENCOUNTER — TRANSCRIPTION ENCOUNTER (OUTPATIENT)
Age: 74
End: 2024-06-03

## 2024-06-03 LAB
ANION GAP SERPL CALC-SCNC: 13 MMOL/L — SIGNIFICANT CHANGE UP (ref 7–14)
APTT BLD: 100.5 SEC — HIGH (ref 24.5–35.6)
APTT BLD: 87 SEC — HIGH (ref 24.5–35.6)
APTT BLD: 90.9 SEC — HIGH (ref 24.5–35.6)
BUN SERPL-MCNC: 63 MG/DL — HIGH (ref 7–23)
CALCIUM SERPL-MCNC: 8.6 MG/DL — SIGNIFICANT CHANGE UP (ref 8.4–10.5)
CHLORIDE SERPL-SCNC: 92 MMOL/L — LOW (ref 98–107)
CO2 SERPL-SCNC: 28 MMOL/L — SIGNIFICANT CHANGE UP (ref 22–31)
CREAT SERPL-MCNC: 6 MG/DL — HIGH (ref 0.5–1.3)
EGFR: 9 ML/MIN/1.73M2 — LOW
GLUCOSE BLDC GLUCOMTR-MCNC: 171 MG/DL — HIGH (ref 70–99)
GLUCOSE BLDC GLUCOMTR-MCNC: 200 MG/DL — HIGH (ref 70–99)
GLUCOSE BLDC GLUCOMTR-MCNC: 204 MG/DL — HIGH (ref 70–99)
GLUCOSE SERPL-MCNC: 177 MG/DL — HIGH (ref 70–99)
HCT VFR BLD CALC: 23.2 % — LOW (ref 39–50)
HGB BLD-MCNC: 7.7 G/DL — LOW (ref 13–17)
MAGNESIUM SERPL-MCNC: 2.3 MG/DL — SIGNIFICANT CHANGE UP (ref 1.6–2.6)
MCHC RBC-ENTMCNC: 22.3 PG — LOW (ref 27–34)
MCHC RBC-ENTMCNC: 33.2 GM/DL — SIGNIFICANT CHANGE UP (ref 32–36)
MCV RBC AUTO: 67.1 FL — LOW (ref 80–100)
NRBC # BLD: 0 /100 WBCS — SIGNIFICANT CHANGE UP (ref 0–0)
NRBC # FLD: 0.05 K/UL — HIGH (ref 0–0)
PHOSPHATE SERPL-MCNC: 3 MG/DL — SIGNIFICANT CHANGE UP (ref 2.5–4.5)
PLATELET # BLD AUTO: 466 K/UL — HIGH (ref 150–400)
POTASSIUM SERPL-MCNC: 4.4 MMOL/L — SIGNIFICANT CHANGE UP (ref 3.5–5.3)
POTASSIUM SERPL-SCNC: 4.4 MMOL/L — SIGNIFICANT CHANGE UP (ref 3.5–5.3)
RBC # BLD: 3.46 M/UL — LOW (ref 4.2–5.8)
RBC # FLD: 22.8 % — HIGH (ref 10.3–14.5)
SODIUM SERPL-SCNC: 133 MMOL/L — LOW (ref 135–145)
WBC # BLD: 11.2 K/UL — HIGH (ref 3.8–10.5)
WBC # FLD AUTO: 11.2 K/UL — HIGH (ref 3.8–10.5)

## 2024-06-03 PROCEDURE — 99233 SBSQ HOSP IP/OBS HIGH 50: CPT

## 2024-06-03 PROCEDURE — 99232 SBSQ HOSP IP/OBS MODERATE 35: CPT

## 2024-06-03 RX ORDER — HEPARIN SODIUM 5000 [USP'U]/ML
700 INJECTION INTRAVENOUS; SUBCUTANEOUS
Qty: 25000 | Refills: 0 | Status: DISCONTINUED | OUTPATIENT
Start: 2024-06-03 | End: 2024-06-05

## 2024-06-03 RX ADMIN — HEPARIN SODIUM 700 UNIT(S)/HR: 5000 INJECTION INTRAVENOUS; SUBCUTANEOUS at 07:05

## 2024-06-03 RX ADMIN — PANTOPRAZOLE SODIUM 40 MILLIGRAM(S): 20 TABLET, DELAYED RELEASE ORAL at 17:17

## 2024-06-03 RX ADMIN — Medication 100 MILLIGRAM(S): at 11:39

## 2024-06-03 RX ADMIN — ERYTHROPOIETIN 10000 UNIT(S): 10000 INJECTION, SOLUTION INTRAVENOUS; SUBCUTANEOUS at 18:05

## 2024-06-03 RX ADMIN — HUMAN INSULIN 4 UNIT(S): 100 INJECTION, SUSPENSION SUBCUTANEOUS at 06:02

## 2024-06-03 RX ADMIN — Medication 2: at 17:18

## 2024-06-03 RX ADMIN — PANTOPRAZOLE SODIUM 40 MILLIGRAM(S): 20 TABLET, DELAYED RELEASE ORAL at 05:03

## 2024-06-03 RX ADMIN — AMLODIPINE BESYLATE 10 MILLIGRAM(S): 2.5 TABLET ORAL at 05:02

## 2024-06-03 RX ADMIN — HEPARIN SODIUM 0 UNIT(S)/HR: 5000 INJECTION INTRAVENOUS; SUBCUTANEOUS at 00:05

## 2024-06-03 RX ADMIN — HUMAN INSULIN 4 UNIT(S): 100 INJECTION, SUSPENSION SUBCUTANEOUS at 11:40

## 2024-06-03 RX ADMIN — CHLORHEXIDINE GLUCONATE 15 MILLILITER(S): 213 SOLUTION TOPICAL at 17:17

## 2024-06-03 RX ADMIN — CARVEDILOL PHOSPHATE 25 MILLIGRAM(S): 80 CAPSULE, EXTENDED RELEASE ORAL at 05:02

## 2024-06-03 RX ADMIN — Medication 100 MILLIGRAM(S): at 20:16

## 2024-06-03 RX ADMIN — ALBUTEROL 2.5 MILLIGRAM(S): 90 AEROSOL, METERED ORAL at 03:22

## 2024-06-03 RX ADMIN — HEPARIN SODIUM 700 UNIT(S)/HR: 5000 INJECTION INTRAVENOUS; SUBCUTANEOUS at 06:03

## 2024-06-03 RX ADMIN — HUMAN INSULIN 4 UNIT(S): 100 INJECTION, SUSPENSION SUBCUTANEOUS at 17:19

## 2024-06-03 RX ADMIN — HEPARIN SODIUM 700 UNIT(S)/HR: 5000 INJECTION INTRAVENOUS; SUBCUTANEOUS at 18:37

## 2024-06-03 RX ADMIN — ALBUTEROL 2.5 MILLIGRAM(S): 90 AEROSOL, METERED ORAL at 15:49

## 2024-06-03 RX ADMIN — Medication 2: at 06:03

## 2024-06-03 RX ADMIN — ALBUTEROL 2.5 MILLIGRAM(S): 90 AEROSOL, METERED ORAL at 08:43

## 2024-06-03 RX ADMIN — Medication 4: at 11:40

## 2024-06-03 RX ADMIN — CHLORHEXIDINE GLUCONATE 1 APPLICATION(S): 213 SOLUTION TOPICAL at 05:02

## 2024-06-03 RX ADMIN — Medication 300 MILLIGRAM(S): at 11:39

## 2024-06-03 RX ADMIN — ATORVASTATIN CALCIUM 20 MILLIGRAM(S): 80 TABLET, FILM COATED ORAL at 21:36

## 2024-06-03 RX ADMIN — Medication 100 MILLIGRAM(S): at 04:45

## 2024-06-03 RX ADMIN — Medication 650 MILLIGRAM(S): at 05:03

## 2024-06-03 RX ADMIN — ALBUTEROL 2.5 MILLIGRAM(S): 90 AEROSOL, METERED ORAL at 21:59

## 2024-06-03 RX ADMIN — CARVEDILOL PHOSPHATE 25 MILLIGRAM(S): 80 CAPSULE, EXTENDED RELEASE ORAL at 17:18

## 2024-06-03 RX ADMIN — HUMAN INSULIN 4 UNIT(S): 100 INJECTION, SUSPENSION SUBCUTANEOUS at 00:00

## 2024-06-03 NOTE — PROGRESS NOTE ADULT - SUBJECTIVE AND OBJECTIVE BOX
[facility]   MD ELIANE BHAGAT MD ANGELA WONG, PA    TEL:  OFFICE: [phone]  From 5pm-7am Answering Service [phone]    -- RENAL FOLLOW UP NOTE ---Date of Service 06-03-24 @ 13:19    Patient is a 73y old  Male who presents with a chief complaint of Unstable angina, abn EKG (03 Jun 2024 12:33)      Patient seen and examined at bedside.     VITALS:  T(F): 97.8 (06-03-24 @ 08:00), Max: 98.4 (06-02-24 @ 15:21)  HR: 61 (06-03-24 @ 10:36)  BP: 156/56 (06-03-24 @ 08:00)  RR: 20 (06-03-24 @ 08:00)  SpO2: 100% (06-03-24 @ 10:36)  Wt(kg): --    06-02 @ 07:01  -  06-03 @ 07:00  --------------------------------------------------------  IN: 1640 mL / OUT: 0 mL / NET: 1640 mL          PHYSICAL EXAM:  General: NAD  Neck: +trach  Respiratory: CTAB, no wheezes, rales or rhonchi  Cardiovascular: S1, S2, RRR  Gastrointestinal: BS+, soft, NT/ND +peg  Extremities: No peripheral edema    Hospital Medications:   MEDICATIONS  (STANDING):  albuterol    0.083% 2.5 milliGRAM(s) Nebulizer every 6 hours  amLODIPine   Tablet 10 milliGRAM(s) Oral daily  atorvastatin 20 milliGRAM(s) Oral at bedtime  carvedilol 25 milliGRAM(s) Oral every 12 hours  chlorhexidine 0.12% Liquid 15 milliLiter(s) Oral Mucosa every 12 hours  chlorhexidine 2% Cloths 1 Application(s) Topical <User Schedule>  dextrose 10% Bolus 125 milliLiter(s) IV Bolus once  dextrose 5%. 1000 milliLiter(s) (100 mL/Hr) IV Continuous <Continuous>  dextrose 5%. 1000 milliLiter(s) (50 mL/Hr) IV Continuous <Continuous>  dextrose 50% Injectable 25 Gram(s) IV Push once  dextrose 50% Injectable 12.5 Gram(s) IV Push once  epoetin reilly (EPOGEN) Injectable 79016 Unit(s) IV Push <User Schedule>  ferrous    sulfate Liquid 300 milliGRAM(s) Enteral Tube daily  glucagon  Injectable 1 milliGRAM(s) IntraMuscular once  heparin  Infusion. 700 Unit(s)/Hr (7 mL/Hr) IV Continuous <Continuous>  hydrALAZINE 100 milliGRAM(s) Oral three times a day  insulin lispro (ADMELOG) corrective regimen sliding scale   SubCutaneous every 6 hours  insulin NPH human recombinant 4 Unit(s) SubCutaneous every 6 hours  pantoprazole  Injectable 40 milliGRAM(s) IV Push every 12 hours  sodium bicarbonate 650 milliGRAM(s) Oral every 8 hours      LABS:  06-03    133<L>  |  92<L>  |  63<H>  ----------------------------<  177<H>  4.4   |  28  |  6.00<H>    Ca    8.6      03 Jun 2024 05:35  Phos  3.0     06-03  Mg     2.30     06-03      Creatinine Trend: 6.00 <--, 5.26 <--, 4.19 <--, 6.99 <--, 6.43 <--, 5.82 <--, 5.29 <--, 3.96 <--    Phosphorus: 3.0 mg/dL (06-03 @ 05:35)                              7.7    11.20 )-----------( 466      ( 03 Jun 2024 05:35 )             23.2     Urine Studies:  Urinalysis - [06-03-24 @ 05:35]      Color  / Appearance  / SG  / pH       Gluc 177 / Ketone   / Bili  / Urobili        Blood  / Protein  / Leuk Est  / Nitrite       RBC  / WBC  / Hyaline  / Gran  / Sq Epi  / Non Sq Epi  / Bacteria       Iron 16, TIBC 121, %sat 13      [05-17-24 @ 06:00]  Ferritin 720      [05-17-24 @ 06:00]  PTH -- (Ca --)      [05-26-24 @ 01:20]   122  TSH 2.60      [05-17-24 @ 06:00]  Lipid: chol 86, , HDL 27, LDL --      [05-17-24 @ 06:00]        RADIOLOGY & ADDITIONAL STUDIES:

## 2024-06-03 NOTE — PROGRESS NOTE ADULT - ASSESSMENT
74 yo Mw/ PMHx hypertension, ESRD on HD via R radiocephalic fistula (MWF), DM, HTN, p/w hiccups and peaked T waves, admitted to MICU for c/f baclofen toxicity. Patient received 1x HD on admission. R femoral shiley removed 5/2, had 2 RRT for AMS and failed HD via AVF 5/3. S/p emergent R IJ shiley placement 5/3, started CRRT which is now transitioned back to iHD. Vascular planning RUE fistulogram when medically optimized. Extubated to HFNC then reintubated 5/12 for c/f aspiration w/ hypoxic resp failure. S/p trach 5/14. Downgraded from MICU to RCU 5/16.    Recommendations:   - Plan for OR tomorrow for RUE fistulogram, thrombectomy     - Consent pending      - Pre-op: NPO@MN, 1AM labs (CBC, BMP, Mg, PHos, T&S, coags)     - HD today for pre-op optimization      - Appreciate cardiac risk stratification. Please document medical risk stratification for OR   - Continue heparin ggt, nonocclusive R common iliac DVT   - Global care per primary       Vascular Surgery  [id number]     72 yo Mw/ PMHx hypertension, ESRD on HD via R radiocephalic fistula (MWF), DM, HTN, p/w hiccups and peaked T waves, admitted to MICU for c/f baclofen toxicity. Patient received 1x HD on admission. R femoral shiley removed 5/2, had 2 RRT for AMS and failed HD via AVF 5/3. S/p emergent R IJ shiley placement 5/3, started CRRT which is now transitioned back to iHD. Vascular planning RUE fistulogram when medically optimized. Extubated to HFNC then reintubated 5/12 for c/f aspiration w/ hypoxic resp failure. S/p trach 5/14. Downgraded from MICU to RCU 5/16.    Recommendations:   - Plan for OR tomorrow for RUE fistulogram, thrombectomy     - Consent in the chart     - Pre-op: NPO@MN, 1AM labs (CBC, BMP, Mg, PHos, T&S, coags)     - HD today for pre-op optimization      - Appreciate cardiac risk stratification. Please document medical risk stratification for OR   - Continue heparin ggt, nonocclusive R common iliac DVT   - Global care per primary       Vascular Surgery  [id number]

## 2024-06-03 NOTE — PROGRESS NOTE ADULT - ASSESSMENT
74 y/o M PMhx HTN, ESRD (on HD M/W/F), DM who presented with hiccups x 2 days and chest pain found to have peaked T waves. Hospital course c/b AMS s/p RRT on 5/1 and 5/2. Vascular consulted 2/2 RUE AVF access unable to be used s/p R femoral shiley placed for emergent HD. Transferred to MICU and intubated 5/2 for airway protection.   received empiric course of zosyn x 5 days, completed 5/7 and meropenem x 5 days, completed 5/14  B. cereus bacteremia, s/p vanc, completed 5/20    fever- resolved  possible RLL consolidation on CXRs  R non-tunneled HD catheter removed 5/27  blood cultures- NGTD  s/p zosyn x 7 days, completed 5/27    AMS, CVA  TTE- no evidence of valvular disease  s/p LP 5/2, viral encephalitis/ meningitis ruled out  MRI- Punctate foci of restricted diffusion in the left talya and left cerebellum with associated T2 prolongation consistent with acute infarcts  s/p trach 5/14, s/p PEG 5/17    DVT  CT 5/12- Nonocclusive R common iliac vein deep venous thrombosis.  US LE 5/28- no evidence of DVT    Recommendations  monitor off antibiotics  blood cx NGTD  no objection to placing tunneled catheter    Steven Torres M.D.  [facility], Division of Infectious Diseases  [phone]  After 5pm on weekdays and all day on weekends - please call [phone]

## 2024-06-03 NOTE — PROGRESS NOTE ADULT - SUBJECTIVE AND OBJECTIVE BOX
Patient is a 73y old  Male who presents with a chief complaint of Unstable angina, abn EKG (03 Jun 2024 14:00)      SUBJECTIVE / OVERNIGHT EVENTS: BP improved, awaiting RUE fistulogram w thrombectomy in am. medically optimized. on Heparin drip for RLE DVT. stable off ABx. seen by physiatry recs are for acute rehab .    MEDICATIONS  (STANDING):  albuterol    0.083% 2.5 milliGRAM(s) Nebulizer every 6 hours  amLODIPine   Tablet 10 milliGRAM(s) Oral daily  atorvastatin 20 milliGRAM(s) Oral at bedtime  carvedilol 25 milliGRAM(s) Oral every 12 hours  chlorhexidine 0.12% Liquid 15 milliLiter(s) Oral Mucosa every 12 hours  chlorhexidine 2% Cloths 1 Application(s) Topical <User Schedule>  dextrose 10% Bolus 125 milliLiter(s) IV Bolus once  dextrose 5%. 1000 milliLiter(s) (100 mL/Hr) IV Continuous <Continuous>  dextrose 5%. 1000 milliLiter(s) (50 mL/Hr) IV Continuous <Continuous>  dextrose 50% Injectable 25 Gram(s) IV Push once  dextrose 50% Injectable 12.5 Gram(s) IV Push once  epoetin reilly (EPOGEN) Injectable 80392 Unit(s) IV Push <User Schedule>  ferrous    sulfate Liquid 300 milliGRAM(s) Enteral Tube daily  glucagon  Injectable 1 milliGRAM(s) IntraMuscular once  heparin  Infusion. 700 Unit(s)/Hr (7 mL/Hr) IV Continuous <Continuous>  hydrALAZINE 100 milliGRAM(s) Oral three times a day  insulin lispro (ADMELOG) corrective regimen sliding scale   SubCutaneous every 6 hours  insulin NPH human recombinant 4 Unit(s) SubCutaneous every 6 hours  pantoprazole  Injectable 40 milliGRAM(s) IV Push every 12 hours    MEDICATIONS  (PRN):  acetaminophen   Oral Liquid .. 650 milliGRAM(s) Oral every 6 hours PRN Temp greater or equal to 38C (100.4F), Moderate Pain (4 - 6)  dextrose Oral Gel 15 Gram(s) Oral once PRN Blood Glucose LESS THAN 70 milliGRAM(s)/deciliter  hydrALAZINE Injectable 10 milliGRAM(s) IV Push every 8 hours PRN SBP > 180  melatonin 3 milliGRAM(s) Oral at bedtime PRN Insomnia  sodium chloride 0.9% lock flush 10 milliLiter(s) IV Push every 1 hour PRN Pre/post blood products, medications, blood draw, and to maintain line patency      Vital Signs Last 24 Hrs  T(F): 98.1 (06-03-24 @ 22:00), Max: 98.7 (06-03-24 @ 16:15)  HR: 65 (06-03-24 @ 22:00) (58 - 68)  BP: 123/46 (06-03-24 @ 22:00) (123/46 - 172/57)  RR: 18 (06-03-24 @ 22:00) (17 - 20)  SpO2: 100% (06-03-24 @ 22:00) (97% - 100%)  Telemetry:   CAPILLARY BLOOD GLUCOSE      POCT Blood Glucose.: 171 mg/dL (03 Jun 2024 17:17)  POCT Blood Glucose.: 204 mg/dL (03 Jun 2024 11:13)  POCT Blood Glucose.: 200 mg/dL (03 Jun 2024 05:57)  POCT Blood Glucose.: 228 mg/dL (02 Jun 2024 23:56)    I&O's Summary    02 Jun 2024 07:01  -  03 Jun 2024 07:00  --------------------------------------------------------  IN: 1640 mL / OUT: 0 mL / NET: 1640 mL    03 Jun 2024 07:01  -  03 Jun 2024 22:29  --------------------------------------------------------  IN: 400 mL / OUT: 2200 mL / NET: -1800 mL        PHYSICAL EXAM:  GENERAL: NAD, well-developed  HEAD:  Atraumatic, Normocephalic  EYES: EOMI, PERRLA, conjunctiva and sclera clear  NECK: Supple, No JVD  CHEST/LUNG: Clear to auscultation bilaterally; No wheeze  HEART: Regular rate and rhythm; No murmurs, rubs, or gallops  ABDOMEN: Soft, Nontender, Nondistended; Bowel sounds present  EXTREMITIES:  2+ Peripheral Pulses, No clubbing, cyanosis, or edema  PSYCH: AAOx3  NEUROLOGY: non-focal  SKIN: No rashes or lesions    LABS:                        7.7    11.20 )-----------( 466      ( 03 Jun 2024 05:35 )             23.2     06-03    133<L>  |  92<L>  |  63<H>  ----------------------------<  177<H>  4.4   |  28  |  6.00<H>    Ca    8.6      03 Jun 2024 05:35  Phos  3.0     06-03  Mg     2.30     06-03      PTT - ( 03 Jun 2024 11:25 )  PTT:87.0 sec      Urinalysis Basic - ( 03 Jun 2024 05:35 )    Color: x / Appearance: x / SG: x / pH: x  Gluc: 177 mg/dL / Ketone: x  / Bili: x / Urobili: x   Blood: x / Protein: x / Nitrite: x   Leuk Esterase: x / RBC: x / WBC x   Sq Epi: x / Non Sq Epi: x / Bacteria: x        RADIOLOGY & ADDITIONAL TESTS:    Imaging Personally Reviewed:    Consultant(s) Notes Reviewed:      Care Discussed with Consultants/Other Providers:

## 2024-06-03 NOTE — PROGRESS NOTE ADULT - ASSESSMENT
ASSESSMENT   74 YO M with PMHx of ESRD on HD MWF, HTN, and 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 Baclofen started and course complicated by AMS second to baclofen toxicity requiring intubation and MICU transfer. Course further complicated by LEFT pontine and cerebellar stroke, R AVF stenosis, aspiration and B Cereus bacteremia and RLE DVT. s/p trach and transferred to RCU 5/16 and course complicated by intermittent fever spikes with likely aspiration citrobacter PNA, AOCD, and GIB vs Fe stools.     PLAN  NEUROLOGY  # AMS   - MRI HEAD (5/6) with punctate foci in the left talya and left cerebellum with concern for acute infarct.   - MRA HEAD and NECK (5/6) with no large vessel occlusion or stenosis.  - Continue on aspirin and hold DAPT for now pending procedures   - Continue on Lipitor for medical management     PSYCH   # Depression   - Concern for depression with questionable SI   - CO started, however formal discussion and evaluation with no true SI   - Supportive care and encouragement QD    CARDIOVASCULAR  # CP with NSTEMI < demand ischemia with HTN   - Admitted for CP and HTN with peaked T WAVES and ECG with ST deviation on admission   - Troponins elevated on admission with negative CKMB   - TTE (4/30) with EF 72, normal LVRVSF, and no regional wall motion abnormalities   - Case discussed with cardiology and no acute need for cath.     # Septic vs vasoplegic shock  # Hx of HTN   - Home BP medications held and pressors weaned off   - Continue on coreg 25 (increased 6/1), hydralazine 100 TID and Norvasc 10mg (increased 5/31)   - Monitor blood pressures with Hydralazine 10mg IVP Q8H PRN   - IF remains hypertensive then add Imdur     RESPIRATORY  # Respiratory failure   - AMS noted with baclofen toxicity requiring intubation   - Attempted extubation in MICU however course complicated by aspiration and prolonged course and now s/p tracheostomy with IP on 5/14  - Continue on vent 12/500/5/30 and weaned to TC ATC   - Secretions thick and will hold HTS as with strong baseline cough and atrovent to prevent further thickening of secretions   - Continue on Albuterol   - Continue on TC ATC (5/30 - ) and attempt PMV when able    GI  # Dysphagia   - s/p surgical PEG 5/17   - Failed green dye on 6/1 and continue on tube feeds via PEG   - Reattempt SS when able to tolerate PMV    # GIB  - Noted with several episodes of black stool with drop in Hgb (5/26 overnight) requiring PRBC with appropriate response  - Case discussed with GI, questionable anemia from AOCD with ESRD and black stool likely from iron, and no plan for scope at this time  - Continue on PPI and monitor stools  - Will continue to monitor closely specially after resuming Full AC with Heparin gtt     RENAL  # ESRD on HD MWF with R BCF  # Fistula stenosis   - VA AVF (5/2) with Right radiocephalic arteriovenous fistula has no hemodynamically significant stenosis present at the anastomotic site ( cm/sec, EDV 49 cm/sec). The usable segment is partially thrombosed with minimal patency.  - Required R FEMORAL line on medicine, then removed on 5/2, and replaced with R IJ SHILEY on 5/3 for CRRT then converted back to iHD MWF   - R IJ SHILEY removed on 5/10 second to bacteremia and replaced on 5/13  - R IJ SHILEY removed 5/27 given intermittent fevers    - Blood cultures negative and plan to replaced SHILEY on 6/1   - Vein mapping with no adequate veins in UE for conduit (all <2 mm and/or thrombosed) and pending possible revision of RUE AVF.   - Plan for fistulogram and fistula repair next week   - Continue on HD MWF per Renal   - Continue on HCO3 650 tabs     # Hyperphosphatemia   - Continue on Renvela 1600 however phos corrects well with HD   - Monitor off Renvela     INFECTIOUS DISEASE  # Aspiration concern   - Recurrent fever spike and CXR with RLL opacity   - BCx (5/25 and 5/30) negative   - SCx (5/25) negative   - SCx (5/30) with citrobacter   - s/p Zosyn empirically (5/18 - 5/28) with intermittent fever spikes.   - Sputum cx 5/30: Moderate citrobacter koseri - Will follow with ID for need for treatment??    # B Cereus Bacteremia   - Course complicated by fevers and aspiration event   - MRSA (5/1) negative   - BCx (5/2) negative   - SCx (5/4) and BAL (5/12) negative   - BCx (5/10) with bacillus cereus and cleared on (5/12).   - Case discussed with ID and s/p Vancomycin through 5/21 x 10 day course.   - Blood cx 5/30: NGTD at 48hrs  - Monitor off Abx for now    HEME  # AOCD with ESRD   - Anemia panel with AOCD and low % sat   - EPO 10K continued on TIW   - Ferrous supplement added   - Transfused on 5/16 and 5/26 and will monitor HH     VASCULAR   # RLE DVT   - CT AP (5/12) with nonocclusive RIGHT common iliac vein deep venous thrombosis and case discussed with IR with no plans for IVC filter.   - Initially started on a Heparin GTT with course c/b questionable GIB given dark tarry stools, however more likely second to iron tabs.   - Challenge on HSQ with stable HH.   - Plan to resume Heparin  GTT today - no bleeding noted around Shiley today      ENDOCRINE  # IDDM2 A1C 6.9  - c/w NPH 4U Q6 and ISS  - Monitor and adjust insulin based on FS     SKIN  - R FEMORAL (5/1-5/2)   - R IJ SHILEY (5/3-5/10)   - R IJ SHILEY (5/13 - 5/27)   - R IJ SHILEY (6/1 - )     ETHICS/ GOC    - FULL CODE     DISPO - PMR recc KIMI, however if strength improves while in house then plan for RPT PMR evaluation for acute rehab.     ***********************  6/1-shiley placed then HD-some oozing  6/2-quiet o/n   72 YO M with PMHx of ESRD on HD MWF, HTN, and 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 Baclofen started and course complicated by AMS second to baclofen toxicity requiring intubation and MICU transfer. Course further complicated by LEFT pontine and cerebellar stroke, R AVF stenosis, aspiration and B Cereus bacteremia and RLE DVT. s/p trach and transferred to RCU 5/16 and course complicated by intermittent fever spikes with likely aspiration citrobacter PNA, AOCD, and GIB vs Fe stools.     NEUROLOGY  # AMS   - MRI HEAD (5/6) with punctate foci in the left talya and left cerebellum with concern for acute infarct.   - MRA HEAD and NECK (5/6) with no large vessel occlusion or stenosis.  - Continue on aspirin and hold DAPT for now pending procedures   - Continue on Lipitor for medical management     PSYCH   # Depression   - Concern for depression with questionable SI   - CO started, however formal discussion and evaluation with no true SI   - Supportive care and encouragement QD    CARDIOVASCULAR  # CP with NSTEMI < demand ischemia with HTN   - Admitted for CP and HTN with peaked T WAVES and ECG with ST deviation on admission   - Troponins elevated on admission with negative CKMB   - TTE (4/30) with EF 72, normal LVRVSF, and no regional wall motion abnormalities   - Case discussed with cardiology and no acute need for cath.     # Septic vs vasoplegic shock  # Hx of HTN   - Home BP medications held and pressors weaned off   - Continue on coreg 25 (increased 6/1), hydralazine 100 TID and Norvasc 10mg (increased 5/31)   - Monitor blood pressures with Hydralazine 10mg IVP Q8H PRN   - IF remains hypertensive then add Isordil     RESPIRATORY  # Respiratory failure   - AMS noted with baclofen toxicity requiring intubation   - Attempted extubation in MICU however course complicated by aspiration and prolonged course and now s/p tracheostomy with IP on 5/14  - Continue on vent 12/500/5/30 and weaned to TC ATC   - Secretions thick and will hold HTS as with strong baseline cough and atrovent to prevent further thickening of secretions   - Continue on Albuterol with TC ATC (5/30 - )   - Able to tolerate PMV during the day with good phonation     GI  # Dysphagia   - s/p surgical PEG 5/17   - Failed green dye on 6/1 and continue on tube feeds via PEG   - Continue with PMV during the day this week and reattempt SS sometime next week.     # GIB  - Noted with several episodes of black stool with drop in Hgb (5/26 overnight) requiring PRBC with appropriate response  - Case discussed with GI, questionable anemia from AOCD with ESRD and black stool likely from iron, and no plan for scope at this time  - Continue on PPI and monitor stools    RENAL  # ESRD on HD MWF with R BCF  # Fistula stenosis   - VA AVF (5/2) with Right radiocephalic arteriovenous fistula has no hemodynamically significant stenosis present at the anastomotic site ( cm/sec, EDV 49 cm/sec). The usable segment is partially thrombosed with minimal patency.  - Required R FEMORAL line on medicine, then removed on 5/2, and replaced with R IJ SHILEY on 5/3 for CRRT then converted back to iHD MWF   - R IJ SHILEY removed on 5/10 second to bacteremia and replaced on 5/13  - R IJ SHILEY removed 5/27 given intermittent fevers    - Blood cultures negative and plan to replaced SHILEY on 6/1   - Vein mapping with no adequate veins in UE for conduit (all <2 mm and/or thrombosed) and pending possible revision of RUE AVF.   - Plan for fistulogram and fistula repair TOMORROW   - Continue on HD MWF per Renal   - Continue on HCO3 650 tabs     # Hyperphosphatemia   - Continue on Renvela 1600 however phos corrects well with HD   - Monitor off Renvela     INFECTIOUS DISEASE  # Aspiration concern   - Recurrent fever spike and CXR with RLL opacity   - BCx (5/25 and 5/30) negative   - SCx (5/25) negative   - SCx (5/30) with citrobacter and completed Zosyn empirically (5/18 - 5/28)    # B Cereus Bacteremia   - Course complicated by fevers and aspiration event   - MRSA (5/1) negative   - BCx (5/2) negative   - SCx (5/4) and BAL (5/12) negative   - BCx (5/10) with bacillus cereus and cleared on (5/12).   - Case discussed with ID and s/p Vancomycin through 5/21 x 10 day course.     HEME  # AOCD with ESRD   - Anemia panel with AOCD and low % sat   - EPO 10K continued on TIW   - Ferrous supplement added   - Transfused on 5/16 and 5/26 and will monitor HH     VASCULAR   # RLE DVT   - CT AP (5/12) with nonocclusive RIGHT common iliac vein deep venous thrombosis and case discussed with IR with no plans for IVC filter.   - Initially started on a Heparin GTT with course complicated by questionable GIB given dark tarry stools, however more likely second to iron tabs.   - Continue on heparin GTT and monitor     ENDOCRINE  # IDDM2 A1C 6.9  - c/w NPH 4U Q6 and ISS  - Monitor and adjust insulin based on FS     SKIN  - R FEMORAL (5/1-5/2)   - R IJ SHILEY (5/3-5/10)   - R IJ SHILEY (5/13 - 5/27)   - R IJ SHILEY (6/1 - )     ETHICS/ GOC    - FULL CODE     DISPO - PMR recc ACUTE

## 2024-06-03 NOTE — PROGRESS NOTE ADULT - ASSESSMENT
73-year-old male past medical history hypertension, ESRD on dialysis Monday Wednesday Friday, diabetes insulin-dependent presents with hiccups patient endorses persistent hiccups for the last 2 days.   found to have peaked T waves, a new finding. denied dizziness, N/V, denies CP, palps, abd pain  Upon admission seen by CArd, renal and GI  found to have a distended GB prob 2/2 gastroparesis, was started on Reglan  has received 4 doses of baclofen since 4/30 2/2 hiccups, last dose on 5/1 at 5 am  had received Haldol for agitation at 11 pm on 4/30, AMS observed in pm of 5/1  AMS ongoing, RRT x 2 called  now transferred to MICU for encephalopathy requiring  airway protection on 5/2,  intubated for airway protection, was on  propofol and pressors. now off  toxicology consulted upon dx of encephalopathy, not impressed AMS 2/2 Haldol nor baclofen . AMS was 2/2 acute CVA   HD was not done timely until femoral shiley was placed 2/2 clotted RUE AVF. now removed and RIJ shiley placed on 5/3  has been nonverbal since pm 5/1.     MR brain 5/6 c/w L pontine and L cerebellar acute infarcts, no hemorrhage . as per neuro: embolic in nature.   encephalopathy probably 2/2 acute CVA, now follows commands appropriately off sedation.   no SZ focus on EEG,   off CRRT,  HD resumed as per renal.   remains intubated, now trached  off keppra  AC resumed, was initially on  Heparin drip for R common iliac DVT, now held 2/2 GI bleed  completed 5 days of empiric ZOSYN on 5/7, shortly after became septic again after an extubation trial. bacteremia w B. cereus, on Vanc through 5/20 as per ID    s/p trache placement by pulm,  PEG placed by surgery 5/17, tolerating feeds  HD via R IJ.  Tmax overnight( 5/18)  101, cxr c/w RLL PNA, now on Zosyn, blood Cx s sent. remains on Vanco for B. cereus bacteremia   leukocytosis resolved  EKG changes on 5/3 c/w NSTEMI loaded w DAPT , was  on Heparin drip x 48 hrs. rpt TTE is unchanged  ID, Neuro , renal, cardiology following.  clotted RUE AVF.  fistulogram was cancelled on 5/25 2/2 fever  HD via [employer], replaced 6/1  LP done, no sign of meningitis.   NEUROLOGY     - CTH without acute findings   - LP done, no acute findings  - MR brain w acute infarcts: L talya and L cerebellum, nonhemorrhagic  - no Sz focus on EEG    CARDIOVASCULAR   - ptn never had CP. just peaked T waves. was awaiting ischemic study w nucl stress test  - TTE showing EF 72%, rpt unchanged  - EKG changes on 5/3, loaded w DAPT  - BP improved.  on Norvasc, Coreg and hydralazine, max doses. as per renal add Imdur to improve BP    GI  - PEG placed, npo w tube feeds  - Gastroparesis       RENAL   -  HD as per renal  - kolby replaced 6/1  - awaiting RUE fistulogram w thrombectomy in am. medically optimized.       INFECTIOUS DISEASE     completed a course of Zosyn, then became septic, bacteremic a B. cereus, completed 3 days of Meropenem, completed vanc through 5/21  on Zosyn since 5/18 for RLL PNA, afebrile, completed 5/23  recurrent fever 5/25, ZOsyn resumed and stopped on 5/28. afebrile since    ENDOCRINE   - DM  - cw ISS    DVT  - RLE, on Heparin drip,     GOC:  Full code  Dispo: acute rehab

## 2024-06-03 NOTE — PROGRESS NOTE ADULT - SUBJECTIVE AND OBJECTIVE BOX
CHIEF COMPLAINT: Patient is a 73y old  Male who presents with a chief complaint of Unstable angina, abn EKG (03 Jun 2024 08:34)      INTERVAL EVENTS:    REVIEW OF SYSTEMS: Seen by bedside during AM rounds and     Mode: standby      OBJECTIVE:  ICU Vital Signs Last 24 Hrs  T(C): 36.6 (03 Jun 2024 08:00), Max: 36.9 (02 Jun 2024 15:21)  T(F): 97.8 (03 Jun 2024 08:00), Max: 98.4 (02 Jun 2024 15:21)  HR: 61 (03 Jun 2024 08:00) (57 - 68)  BP: 156/56 (03 Jun 2024 08:00) (153/62 - 185/62)  BP(mean): 87 (03 Jun 2024 06:00) (85 - 87)  ABP: --  ABP(mean): --  RR: 20 (03 Jun 2024 08:00) (17 - 20)  SpO2: 97% (03 Jun 2024 08:00) (97% - 100%)    O2 Parameters below as of 03 Jun 2024 08:00  Patient On (Oxygen Delivery Method): T-piece  O2 Flow (L/min): 6  O2 Concentration (%): 28      Mode: standby    06-02 @ 07:01  -  06-03 @ 07:00  --------------------------------------------------------  IN: 1640 mL / OUT: 0 mL / NET: 1640 mL      CAPILLARY BLOOD GLUCOSE      POCT Blood Glucose.: 200 mg/dL (03 Jun 2024 05:57)      HOSPITAL MEDICATIONS:  MEDICATIONS  (STANDING):  albuterol    0.083% 2.5 milliGRAM(s) Nebulizer every 6 hours  amLODIPine   Tablet 10 milliGRAM(s) Oral daily  atorvastatin 20 milliGRAM(s) Oral at bedtime  carvedilol 25 milliGRAM(s) Oral every 12 hours  chlorhexidine 0.12% Liquid 15 milliLiter(s) Oral Mucosa every 12 hours  chlorhexidine 2% Cloths 1 Application(s) Topical <User Schedule>  dextrose 10% Bolus 125 milliLiter(s) IV Bolus once  dextrose 5%. 1000 milliLiter(s) (100 mL/Hr) IV Continuous <Continuous>  dextrose 5%. 1000 milliLiter(s) (50 mL/Hr) IV Continuous <Continuous>  dextrose 50% Injectable 25 Gram(s) IV Push once  dextrose 50% Injectable 12.5 Gram(s) IV Push once  epoetin reilly (EPOGEN) Injectable 18819 Unit(s) IV Push <User Schedule>  ferrous    sulfate Liquid 300 milliGRAM(s) Enteral Tube daily  glucagon  Injectable 1 milliGRAM(s) IntraMuscular once  heparin  Infusion. 700 Unit(s)/Hr (7 mL/Hr) IV Continuous <Continuous>  hydrALAZINE 100 milliGRAM(s) Oral three times a day  insulin lispro (ADMELOG) corrective regimen sliding scale   SubCutaneous every 6 hours  insulin NPH human recombinant 4 Unit(s) SubCutaneous every 6 hours  pantoprazole  Injectable 40 milliGRAM(s) IV Push every 12 hours  sodium bicarbonate 650 milliGRAM(s) Oral every 8 hours    MEDICATIONS  (PRN):  acetaminophen   Oral Liquid .. 650 milliGRAM(s) Oral every 6 hours PRN Temp greater or equal to 38C (100.4F), Moderate Pain (4 - 6)  dextrose Oral Gel 15 Gram(s) Oral once PRN Blood Glucose LESS THAN 70 milliGRAM(s)/deciliter  hydrALAZINE Injectable 10 milliGRAM(s) IV Push every 8 hours PRN SBP > 180  melatonin 3 milliGRAM(s) Oral at bedtime PRN Insomnia  sodium chloride 0.9% lock flush 10 milliLiter(s) IV Push every 1 hour PRN Pre/post blood products, medications, blood draw, and to maintain line patency      PHYSICAL EXAMINATION    LABS:                        7.7    11.20 )-----------( 466      ( 03 Jun 2024 05:35 )             23.2     06-03    133<L>  |  92<L>  |  63<H>  ----------------------------<  177<H>  4.4   |  28  |  6.00<H>    Ca    8.6      03 Jun 2024 05:35  Phos  3.0     06-03  Mg     2.30     06-03      PT/INR - ( 01 Jun 2024 09:25 )   PT: 12.3 sec;   INR: 1.09 ratio         PTT - ( 03 Jun 2024 05:35 )  PTT:90.9 sec  Urinalysis Basic - ( 03 Jun 2024 05:35 )    Color: x / Appearance: x / SG: x / pH: x  Gluc: 177 mg/dL / Ketone: x  / Bili: x / Urobili: x   Blood: x / Protein: x / Nitrite: x   Leuk Esterase: x / RBC: x / WBC x   Sq Epi: x / Non Sq Epi: x / Bacteria: x            PAST MEDICAL & SURGICAL HISTORY:  Diabetes      Benign essential HTN      HLD (hyperlipidemia)      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 CHIEF COMPLAINT: Patient is a 73y old  Male who presents with a chief complaint of Unstable angina, abn EKG (03 Jun 2024 08:34)      INTERVAL EVENTS:  - No interval events overnight   - Able to tolerate PMV during the day   - Fistulogram tomorrow     REVIEW OF SYSTEMS: Seen by bedside during AM rounds and denies SOB    Mode: standby      OBJECTIVE:  ICU Vital Signs Last 24 Hrs  T(C): 36.6 (03 Jun 2024 08:00), Max: 36.9 (02 Jun 2024 15:21)  T(F): 97.8 (03 Jun 2024 08:00), Max: 98.4 (02 Jun 2024 15:21)  HR: 61 (03 Jun 2024 08:00) (57 - 68)  BP: 156/56 (03 Jun 2024 08:00) (153/62 - 185/62)  BP(mean): 87 (03 Jun 2024 06:00) (85 - 87)  ABP: --  ABP(mean): --  RR: 20 (03 Jun 2024 08:00) (17 - 20)  SpO2: 97% (03 Jun 2024 08:00) (97% - 100%)    O2 Parameters below as of 03 Jun 2024 08:00  Patient On (Oxygen Delivery Method): T-piece  O2 Flow (L/min): 6  O2 Concentration (%): 28      Mode: standby    06-02 @ 07:01  -  06-03 @ 07:00  --------------------------------------------------------  IN: 1640 mL / OUT: 0 mL / NET: 1640 mL      CAPILLARY BLOOD GLUCOSE  POCT Blood Glucose.: 200 mg/dL (03 Jun 2024 05:57)      HOSPITAL MEDICATIONS:  MEDICATIONS  (STANDING):  albuterol    0.083% 2.5 milliGRAM(s) Nebulizer every 6 hours  amLODIPine   Tablet 10 milliGRAM(s) Oral daily  atorvastatin 20 milliGRAM(s) Oral at bedtime  carvedilol 25 milliGRAM(s) Oral every 12 hours  chlorhexidine 0.12% Liquid 15 milliLiter(s) Oral Mucosa every 12 hours  chlorhexidine 2% Cloths 1 Application(s) Topical <User Schedule>  dextrose 10% Bolus 125 milliLiter(s) IV Bolus once  dextrose 5%. 1000 milliLiter(s) (100 mL/Hr) IV Continuous <Continuous>  dextrose 5%. 1000 milliLiter(s) (50 mL/Hr) IV Continuous <Continuous>  dextrose 50% Injectable 25 Gram(s) IV Push once  dextrose 50% Injectable 12.5 Gram(s) IV Push once  epoetin reilly (EPOGEN) Injectable 75869 Unit(s) IV Push <User Schedule>  ferrous    sulfate Liquid 300 milliGRAM(s) Enteral Tube daily  glucagon  Injectable 1 milliGRAM(s) IntraMuscular once  heparin  Infusion. 700 Unit(s)/Hr (7 mL/Hr) IV Continuous <Continuous>  hydrALAZINE 100 milliGRAM(s) Oral three times a day  insulin lispro (ADMELOG) corrective regimen sliding scale   SubCutaneous every 6 hours  insulin NPH human recombinant 4 Unit(s) SubCutaneous every 6 hours  pantoprazole  Injectable 40 milliGRAM(s) IV Push every 12 hours  sodium bicarbonate 650 milliGRAM(s) Oral every 8 hours    MEDICATIONS  (PRN):  acetaminophen   Oral Liquid .. 650 milliGRAM(s) Oral every 6 hours PRN Temp greater or equal to 38C (100.4F), Moderate Pain (4 - 6)  dextrose Oral Gel 15 Gram(s) Oral once PRN Blood Glucose LESS THAN 70 milliGRAM(s)/deciliter  hydrALAZINE Injectable 10 milliGRAM(s) IV Push every 8 hours PRN SBP > 180  melatonin 3 milliGRAM(s) Oral at bedtime PRN Insomnia  sodium chloride 0.9% lock flush 10 milliLiter(s) IV Push every 1 hour PRN Pre/post blood products, medications, blood draw, and to maintain line patency      PHYSICAL EXAMINATION  General: NAD   HEENT: R IJ SHILEY and trach present   Cards: S1/S2, no murmurs   Pulm: CTA bilaterally. No wheezes.   Abdomen: Soft, nondistended and nontender. BS (+) PEG present.   Extremities: No pedal edema. THAI of BL upper and lower extremities with weakness. RUE fistula with no thrill.   Neurology: AOx3 with no acute focal neurological deficits     LABS:                        7.7    11.20 )-----------( 466      ( 03 Jun 2024 05:35 )             23.2     06-03    133<L>  |  92<L>  |  63<H>  ----------------------------<  177<H>  4.4   |  28  |  6.00<H>    Ca    8.6      03 Jun 2024 05:35  Phos  3.0     06-03  Mg     2.30     06-03      PT/INR - ( 01 Jun 2024 09:25 )   PT: 12.3 sec;   INR: 1.09 ratio         PTT - ( 03 Jun 2024 05:35 )  PTT:90.9 sec  Urinalysis Basic - ( 03 Jun 2024 05:35 )    Color: x / Appearance: x / SG: x / pH: x  Gluc: 177 mg/dL / Ketone: x  / Bili: x / Urobili: x   Blood: x / Protein: x / Nitrite: x   Leuk Esterase: x / RBC: x / WBC x   Sq Epi: x / Non Sq Epi: x / Bacteria: x            PAST MEDICAL & SURGICAL HISTORY:  Diabetes      Benign essential HTN      HLD (hyperlipidemia)      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

## 2024-06-03 NOTE — PROGRESS NOTE ADULT - SUBJECTIVE AND OBJECTIVE BOX
[facility], Division of Infectious Diseases  AUGUST Carbajal Y. Patel, S. Shah, G. Brian  [phone]  ([phone] - weekdays after 5pm and weekends)    Name: JIMMY BLAND  Age/Gender: 73y Male  MRN: [id number]    Interval History:  Notes reviewed.   No concerning overnight events.  Afebrile.     Allergies: No Known Allergies      Objective:  Vitals:   T(F): 97.8 (06-03-24 @ 08:00), Max: 98.4 (06-02-24 @ 15:21)  HR: 61 (06-03-24 @ 10:36) (57 - 68)  BP: 156/56 (06-03-24 @ 08:00) (153/62 - 185/62)  RR: 20 (06-03-24 @ 08:00) (17 - 20)  SpO2: 100% (06-03-24 @ 10:36) (97% - 100%)  Physical Examination:  General: no acute distress  HEENT: tracheostomy  Heart: normal rate  Lungs: breathing comfortably   Abdomen: Soft. ND. NT  Extremities: No LE edema.   Skin: Warm. Dry.     Laboratory Studies:  CBC:                       7.7    11.20 )-----------( 466      ( 03 Jun 2024 05:35 )             23.2     WBC Trend:  11.20 06-03-24 @ 05:35  10.33 06-02-24 @ 23:15  9.43 06-02-24 @ 17:47  9.46 06-02-24 @ 06:00  11.03 06-01-24 @ 06:00  10.08 05-31-24 @ 05:10  10.31 05-30-24 @ 18:10  8.79 05-30-24 @ 06:57  10.18 05-29-24 @ 18:40  10.59 05-29-24 @ 05:10  7.69 05-28-24 @ 19:00  5.92 05-28-24 @ 05:25  7.04 05-27-24 @ 19:22    CMP: 06-03    133<L>  |  92<L>  |  63<H>  ----------------------------<  177<H>  4.4   |  28  |  6.00<H>    Ca    8.6      03 Jun 2024 05:35  Phos  3.0     06-03  Mg     2.30     06-03            Urinalysis Basic - ( 03 Jun 2024 05:35 )    Color: x / Appearance: x / SG: x / pH: x  Gluc: 177 mg/dL / Ketone: x  / Bili: x / Urobili: x   Blood: x / Protein: x / Nitrite: x   Leuk Esterase: x / RBC: x / WBC x   Sq Epi: x / Non Sq Epi: x / Bacteria: x      Microbiology: reviewed     Culture - Sputum (collected 05-30-24 @ 11:44)  Source: .Sputum Sputum  Gram Stain (05-30-24 @ 19:45):    Moderate polymorphonuclear leukocytes per low power field    Few Squamous epithelial cells per low power field    Few Gram Negative Rods per oil power field  Final Report (06-01-24 @ 12:02):    Moderate Citrobacter koseri    Normal Respiratory Jocelyn present  Organism: Citrobacter koseri (06-01-24 @ 12:02)  Organism: Citrobacter koseri (06-01-24 @ 12:02)      Method Type: VIDA      -  Amoxicillin/Clavulanic Acid: S <=8/4      -  Ampicillin: R >16 These ampicillin results predict results for amoxicillin      -  Ampicillin/Sulbactam: S <=4/2      -  Aztreonam: S <=4      -  Cefazolin: S <=2      -  Cefepime: S <=2      -  Cefoxitin: S <=8      -  Ceftriaxone: S <=1      -  Ciprofloxacin: S <=0.25      -  Ertapenem: S <=0.5      -  Gentamicin: S <=2      -  Imipenem: S <=1      -  Levofloxacin: S <=0.5      -  Meropenem: S <=1      -  Piperacillin/Tazobactam: S <=8      -  Tobramycin: S <=2      -  Trimethoprim/Sulfamethoxazole: S <=0.5/9.5    Culture - Blood (collected 05-30-24 @ 09:03)  Source: .Blood Blood-Peripheral  Preliminary Report (06-02-24 @ 15:01):    No growth at 72 Hours    Culture - Blood (collected 05-30-24 @ 08:48)  Source: .Blood Blood-Venous  Preliminary Report (06-02-24 @ 15:01):    No growth at 72 Hours    Culture - Sputum (collected 05-25-24 @ 21:28)  Source: Trach Asp Tracheal Aspirate  Gram Stain (05-26-24 @ 13:57):    Few polymorphonuclear leukocytes per low power field    Rare Gram Negative Rods per oil power field  Final Report (05-27-24 @ 18:28):    Normal Respiratory Ojcelyn present    Culture - Blood (collected 05-25-24 @ 19:00)  Source: .Blood Blood-Peripheral  Final Report (05-30-24 @ 22:01):    No growth at 5 days    Culture - Blood (collected 05-25-24 @ 18:45)  Source: .Blood Blood-Peripheral  Final Report (05-30-24 @ 22:01):    No growth at 5 days        Radiology: reviewed     Medications:  acetaminophen   Oral Liquid .. 650 milliGRAM(s) Oral every 6 hours PRN  albuterol    0.083% 2.5 milliGRAM(s) Nebulizer every 6 hours  amLODIPine   Tablet 10 milliGRAM(s) Oral daily  atorvastatin 20 milliGRAM(s) Oral at bedtime  carvedilol 25 milliGRAM(s) Oral every 12 hours  chlorhexidine 0.12% Liquid 15 milliLiter(s) Oral Mucosa every 12 hours  chlorhexidine 2% Cloths 1 Application(s) Topical <User Schedule>  dextrose 10% Bolus 125 milliLiter(s) IV Bolus once  dextrose 5%. 1000 milliLiter(s) IV Continuous <Continuous>  dextrose 5%. 1000 milliLiter(s) IV Continuous <Continuous>  dextrose 50% Injectable 25 Gram(s) IV Push once  dextrose 50% Injectable 12.5 Gram(s) IV Push once  dextrose Oral Gel 15 Gram(s) Oral once PRN  epoetin reilly (EPOGEN) Injectable 41776 Unit(s) IV Push <User Schedule>  ferrous    sulfate Liquid 300 milliGRAM(s) Enteral Tube daily  glucagon  Injectable 1 milliGRAM(s) IntraMuscular once  heparin  Infusion. 700 Unit(s)/Hr IV Continuous <Continuous>  hydrALAZINE 100 milliGRAM(s) Oral three times a day  hydrALAZINE Injectable 10 milliGRAM(s) IV Push every 8 hours PRN  insulin lispro (ADMELOG) corrective regimen sliding scale   SubCutaneous every 6 hours  insulin NPH human recombinant 4 Unit(s) SubCutaneous every 6 hours  melatonin 3 milliGRAM(s) Oral at bedtime PRN  pantoprazole  Injectable 40 milliGRAM(s) IV Push every 12 hours  sodium bicarbonate 650 milliGRAM(s) Oral every 8 hours  sodium chloride 0.9% lock flush 10 milliLiter(s) IV Push every 1 hour PRN    Antimicrobials:

## 2024-06-03 NOTE — PROGRESS NOTE ADULT - ASSESSMENT
73-year-old male past medical history hypertension, ESRD on dialysis Monday Wednesday Friday, diabetes insulin-dependent presents with hiccups patient endorses persistent hiccups for the last 2 days. Nephrology consulted for HD needs.    A/P  ESRD:  Center: [facility]  Nephrologist: Dr. Hardwick  Access: R AVF now nonfunctioning vascular on board, s/p right shiley by IR. pending permacath placement when medically cleared   MWF schedule  hd today  Consent obtained and placed in ED chart  Renal diet  Monitor BMP  Consider [facility] for rehab where his outpatient Nephrologist, Dr. Hardwick can follow him    Hyperkalemia/Hypokalemia  Improved w/ HD.  C/W HD schedule as above.  Lokelma 10gm PRN for K >5.3 on non-HD days  K stable.  Low K diet.  Monitor closely.    HTN:  BP fluctuating but acceptable   On carvedilol 12.5mg BID, hydralazine 100mg TID.  on norvasc   UF w/ HD as BP permits.  Monitor BP.    Anemia:  Hgb low.  + iron deficiency.  Tsat 13% - on ferrous sulfate 300mg qd via PEG.  Venofer held d/t leukocytosis.  SILVA w/ HD.  Transfuse for Hgb <7.  Monitor Hb.    CKD-MBD   - low for CKD.  PO4 at goal; prev. low.  Sevelamer 1600mg TID d/c'ed 5/21.  Monitor Ca, PO4 daily    Hypocalcemia:  In setting of CKD vs. hypoalbuminemia.  Optimize albumin.  Corrected Ca WNL.  Replete as needed.  Monitor Ca.    hyponatremia  hd today  monitor

## 2024-06-03 NOTE — PROGRESS NOTE ADULT - SUBJECTIVE AND OBJECTIVE BOX
Patient is a 73y old  Male who presents with a chief complaint of Unstable angina, abn EKG (03 Jun 2024 09:27)      HPI:  73-year-old male past medical history hypertension, ESRD on dialysis Monday Wednesday Friday, diabetes insulin-dependent presents with hiccups patient endorses persistent hiccups for the last 2 days. found to have peaked T waves, a new finding. denies dizziness, N/V, denies CP, palps, abd pain (29 Apr 2024 21:15)      REVIEW OF SYSTEMS  weakness      PAST MEDICAL & SURGICAL HISTORY  Diabetes    Stage 5 chronic kidney disease not on chronic dialysis    Benign essential HTN    HLD (hyperlipidemia)    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    ESRD on hemodialysis    No significant past surgical history    AVF (arteriovenous fistula)    Arteriovenous fistula         CURRENT FUNCTIONAL STATUS  5/30   Bed Mobility  Bed Mobility Training Rehab Potential: good, to achieve stated therapy goals  Bed Mobility Training Symptoms Noted During/After Treatment: none  Bed Mobility Training Scooting: maximum assist (25% patient effort);  supervision;  verbal cues;  1 person assist;  bed rails  Bed Mobility Training Sit-to-Supine: maximum assist (25% patient effort);  supervision;  verbal cues;  1 person assist;  bed rails  Bed Mobility Training Supine-to-Sit: maximum assist (25% patient effort);  supervision;  verbal cues;  1 person assist;  bed rails  Bed Mobility Training Limitations: decreased ability to use arms for pushing/pulling;  decreased ability to use legs for bridging/pushing;  impaired ability to control trunk for mobility;  decreased flexibility;  decreased ROM;  decreased strength;  impaired balance;  impaired postural control    Sit-Stand Transfer Training  Sit-to-Stand Transfer Training Treatment not Performed: unable to assess; poor sitting balance and right leg weakness,    FAMILY HISTORY   FHx: diabetes mellitus (Father, Aunt)        RECENT LABS/IMAGING  CBC Full  -  ( 03 Jun 2024 05:35 )  WBC Count : 11.20 K/uL  RBC Count : 3.46 M/uL  Hemoglobin : 7.7 g/dL  Hematocrit : 23.2 %  Platelet Count - Automated : 466 K/uL  Mean Cell Volume : 67.1 fL  Mean Cell Hemoglobin : 22.3 pg  Mean Cell Hemoglobin Concentration : 33.2 gm/dL  Auto Neutrophil # : x  Auto Lymphocyte # : x  Auto Monocyte # : x  Auto Eosinophil # : x  Auto Basophil # : x  Auto Neutrophil % : x  Auto Lymphocyte % : x  Auto Monocyte % : x  Auto Eosinophil % : x  Auto Basophil % : x    06-03    133<L>  |  92<L>  |  63<H>  ----------------------------<  177<H>  4.4   |  28  |  6.00<H>    Ca    8.6      03 Jun 2024 05:35  Phos  3.0     06-03  Mg     2.30     06-03      Urinalysis Basic - ( 03 Jun 2024 05:35 )    Color: x / Appearance: x / SG: x / pH: x  Gluc: 177 mg/dL / Ketone: x  / Bili: x / Urobili: x   Blood: x / Protein: x / Nitrite: x   Leuk Esterase: x / RBC: x / WBC x   Sq Epi: x / Non Sq Epi: x / Bacteria: x        VITALS  T(C): 36.6 (06-03-24 @ 08:00), Max: 36.9 (06-02-24 @ 15:21)  HR: 61 (06-03-24 @ 10:36) (57 - 68)  BP: 156/56 (06-03-24 @ 08:00) (153/62 - 185/62)  RR: 20 (06-03-24 @ 08:00) (17 - 20)  SpO2: 100% (06-03-24 @ 10:36) (97% - 100%)  Wt(kg): --    ALLERGIES  No Known Allergies      MEDICATIONS   acetaminophen   Oral Liquid .. 650 milliGRAM(s) Oral every 6 hours PRN  albuterol    0.083% 2.5 milliGRAM(s) Nebulizer every 6 hours  amLODIPine   Tablet 10 milliGRAM(s) Oral daily  atorvastatin 20 milliGRAM(s) Oral at bedtime  carvedilol 25 milliGRAM(s) Oral every 12 hours  chlorhexidine 0.12% Liquid 15 milliLiter(s) Oral Mucosa every 12 hours  chlorhexidine 2% Cloths 1 Application(s) Topical <User Schedule>  dextrose 10% Bolus 125 milliLiter(s) IV Bolus once  dextrose 5%. 1000 milliLiter(s) IV Continuous <Continuous>  dextrose 5%. 1000 milliLiter(s) IV Continuous <Continuous>  dextrose 50% Injectable 25 Gram(s) IV Push once  dextrose 50% Injectable 12.5 Gram(s) IV Push once  dextrose Oral Gel 15 Gram(s) Oral once PRN  epoetin reilly (EPOGEN) Injectable 43608 Unit(s) IV Push <User Schedule>  ferrous    sulfate Liquid 300 milliGRAM(s) Enteral Tube daily  glucagon  Injectable 1 milliGRAM(s) IntraMuscular once  heparin  Infusion. 700 Unit(s)/Hr IV Continuous <Continuous>  hydrALAZINE 100 milliGRAM(s) Oral three times a day  hydrALAZINE Injectable 10 milliGRAM(s) IV Push every 8 hours PRN  insulin lispro (ADMELOG) corrective regimen sliding scale   SubCutaneous every 6 hours  insulin NPH human recombinant 4 Unit(s) SubCutaneous every 6 hours  melatonin 3 milliGRAM(s) Oral at bedtime PRN  pantoprazole  Injectable 40 milliGRAM(s) IV Push every 12 hours  sodium bicarbonate 650 milliGRAM(s) Oral every 8 hours  sodium chloride 0.9% lock flush 10 milliLiter(s) IV Push every 1 hour PRN      ----------------------------------------------------------------------------------------   PHYSICAL EXAM    Constitutional - NAD   HEENT - + tc  Chest -no respiratory distress  Cardiovascular - RRR, S1S2   Abdomen -  +PEG, Soft, NTND  Extremities - No C/C/E, No calf tenderness   Neurologic Exam -                    Cognitive - Awake, Alert      Communication - Fluent, No dysarthria     Cranial Nerves - CN 2-12 intact     Motor -                      LEFT    UE - ShAB 4/5, EF 4/5, EE 4/5, WE 4/5,  4/5                    RIGHT UE - ShAB 4/5, EF 4/5, EE 4/5, WE 4/5,  4/5                    LEFT    LE - HF 3/5, KE 3/5, DF 3/5, PF 3/5                    RIGHT LE - HF 3/5, KE 3/5, DF 3/5, PF 3/5        Sensory - Intact to LT           Balance - WNL Static  Psychiatric - Mood stable, Affect WNL  ----------------------------------------------------------------------------------------  ASSESSMENT/PLAN  73 year old male, history of ESRD on HD MWF, HTN, and IDDM2 A1C 6.9 who presented chest pain complicated by hiccups x 2 days and admitted for NSTEMI, course complicated by baclofen toxicity, GI bleed, dvt  out of bed with cristin as tolerates  continue bedside PT and OT  SLP    DVT PPX - heparin sq  out of bed to chair as tolerates, tolerating cristin  Rehab - patient is max assist for bed mobility. Goal is for acute rehab if tolerating therapy

## 2024-06-03 NOTE — PROGRESS NOTE ADULT - SUBJECTIVE AND OBJECTIVE BOX
Cardiovascular Disease Progress Note  DATE OF SERVICE: 06-03-24 @ 09:27    Overnight events: No acute events overnight.    Mr. Art is in no distress on trach collar.        Objective Findings:  T(C): 36.6 (06-03-24 @ 08:00), Max: 36.9 (06-02-24 @ 15:21)  HR: 60 (06-03-24 @ 09:23) (57 - 68)  BP: 156/56 (06-03-24 @ 08:00) (153/62 - 185/62)  RR: 20 (06-03-24 @ 08:00) (17 - 20)  SpO2: 100% (06-03-24 @ 09:23) (97% - 100%)  Wt(kg): --  Daily     Daily       Physical Exam:  Gen: NAD; Patient resting comfortably  HEENT: EOMI, Normocephalic/ atraumatic  CV: RRR, normal S1 + S2, no m/r/g  Lungs:  Normal respiratory effort; clear to auscultation bilaterally  Abd: soft, non-tender; bowel sounds present  Ext: No edema; warm and well perfused    Telemetry: n/a    Laboratory Data:                        7.7    11.20 )-----------( 466      ( 03 Jun 2024 05:35 )             23.2     06-03    133<L>  |  92<L>  |  63<H>  ----------------------------<  177<H>  4.4   |  28  |  6.00<H>    Ca    8.6      03 Jun 2024 05:35  Phos  3.0     06-03  Mg     2.30     06-03      PTT - ( 03 Jun 2024 05:35 )  PTT:90.9 sec          Inpatient Medications:  MEDICATIONS  (STANDING):  albuterol    0.083% 2.5 milliGRAM(s) Nebulizer every 6 hours  amLODIPine   Tablet 10 milliGRAM(s) Oral daily  atorvastatin 20 milliGRAM(s) Oral at bedtime  carvedilol 25 milliGRAM(s) Oral every 12 hours  chlorhexidine 0.12% Liquid 15 milliLiter(s) Oral Mucosa every 12 hours  chlorhexidine 2% Cloths 1 Application(s) Topical <User Schedule>  dextrose 10% Bolus 125 milliLiter(s) IV Bolus once  dextrose 5%. 1000 milliLiter(s) (100 mL/Hr) IV Continuous <Continuous>  dextrose 5%. 1000 milliLiter(s) (50 mL/Hr) IV Continuous <Continuous>  dextrose 50% Injectable 25 Gram(s) IV Push once  dextrose 50% Injectable 12.5 Gram(s) IV Push once  epoetin reilly (EPOGEN) Injectable 01849 Unit(s) IV Push <User Schedule>  ferrous    sulfate Liquid 300 milliGRAM(s) Enteral Tube daily  glucagon  Injectable 1 milliGRAM(s) IntraMuscular once  heparin  Infusion. 700 Unit(s)/Hr (7 mL/Hr) IV Continuous <Continuous>  hydrALAZINE 100 milliGRAM(s) Oral three times a day  insulin lispro (ADMELOG) corrective regimen sliding scale   SubCutaneous every 6 hours  insulin NPH human recombinant 4 Unit(s) SubCutaneous every 6 hours  pantoprazole  Injectable 40 milliGRAM(s) IV Push every 12 hours  sodium bicarbonate 650 milliGRAM(s) Oral every 8 hours      Assessment: 73 year old man with HTN, HLD, T2DM on insulin, and ESRD on HD presents with supply demand ischemia and angina.    Plan of Care:    #HTN-  Readings much improved after amlodipine and Coreg were recently up titrated.       #Type II myocardial infarction-  Secondary to distributive shock earlier on admission.   The repeat echo shows no new wall motion abnormality.   I would not pursue cath at this time given chronic respiratory failure s/p trach 5/14.   The patient is optimized from a cardiac standpoint to proceed with vascular HD access intervention.   - Continue Coreg throughout the jin-operative period.         #ESRD-  HD as per renal     #DVT   - R common Iliac DVT  Recent oozing noted at HD site.   Management as per RCU.     #ACP (advance care planning)-  Advanced care planning was discussed with the patient.    Cardiac findings were discussed in detail and all questions were answered.       Over 55 minutes spent on total encounter; more than 50% of the visit was spent counseling and/or coordinating care by the attending physician.      Geovani Bravo MD [facility]  Cardiovascular Disease  [phone]

## 2024-06-03 NOTE — PROGRESS NOTE ADULT - SUBJECTIVE AND OBJECTIVE BOX
Patient is a 73y Male     Patient is a 73y old  Male who presents with a chief complaint of Unstable angina, abn EKG (02 Jun 2024 21:33)      HPI:  73-year-old male past medical history hypertension, ESRD on dialysis Monday Wednesday Friday, diabetes insulin-dependent presents with hiccups patient endorses persistent hiccups for the last 2 days. found to have peaked T waves, a new finding. denies dizziness, N/V, denies CP, palps, abd pain (29 Apr 2024 21:15)      PAST MEDICAL & SURGICAL HISTORY:  Diabetes      Benign essential HTN      HLD (hyperlipidemia)      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      ESRD on hemodialysis      Arteriovenous fistula          MEDICATIONS  (STANDING):  albuterol    0.083% 2.5 milliGRAM(s) Nebulizer every 6 hours  amLODIPine   Tablet 10 milliGRAM(s) Oral daily  atorvastatin 20 milliGRAM(s) Oral at bedtime  carvedilol 25 milliGRAM(s) Oral every 12 hours  chlorhexidine 0.12% Liquid 15 milliLiter(s) Oral Mucosa every 12 hours  chlorhexidine 2% Cloths 1 Application(s) Topical <User Schedule>  dextrose 10% Bolus 125 milliLiter(s) IV Bolus once  dextrose 5%. 1000 milliLiter(s) (100 mL/Hr) IV Continuous <Continuous>  dextrose 5%. 1000 milliLiter(s) (50 mL/Hr) IV Continuous <Continuous>  dextrose 50% Injectable 25 Gram(s) IV Push once  dextrose 50% Injectable 12.5 Gram(s) IV Push once  epoetin reilly (EPOGEN) Injectable 82083 Unit(s) IV Push <User Schedule>  ferrous    sulfate Liquid 300 milliGRAM(s) Enteral Tube daily  glucagon  Injectable 1 milliGRAM(s) IntraMuscular once  heparin  Infusion. 700 Unit(s)/Hr (7 mL/Hr) IV Continuous <Continuous>  hydrALAZINE 100 milliGRAM(s) Oral three times a day  insulin lispro (ADMELOG) corrective regimen sliding scale   SubCutaneous every 6 hours  insulin NPH human recombinant 4 Unit(s) SubCutaneous every 6 hours  pantoprazole  Injectable 40 milliGRAM(s) IV Push every 12 hours  sodium bicarbonate 650 milliGRAM(s) Oral every 8 hours      Allergies    No Known Allergies    Intolerances        SOCIAL HISTORY:  Denies ETOh,Smoking,     FAMILY HISTORY:  FHx: diabetes mellitus (Father, Aunt)        REVIEW OF SYSTEMS:    CONSTITUTIONAL: No weakness, fevers or chills  EYES/ENT: No visual changes;  No vertigo or throat pain   NECK: No pain or stiffness  RESPIRATORY: No cough, wheezing, hemoptysis; No shortness of breath  CARDIOVASCULAR: No chest pain or palpitations  GASTROINTESTINAL: No abdominal or epigastric pain. No nausea, vomiting, or hematemesis; No diarrhea or constipation. No melena or hematochezia.  GENITOURINARY: No dysuria, frequency or hematuria  NEUROLOGICAL: No numbness or weakness  SKIN: No itching, burning, rashes, or lesions   All other review of systems is negative unless indicated above.    VITAL:  T(C): , Max: 36.9 (06-02-24 @ 15:21)  T(F): , Max: 98.4 (06-02-24 @ 15:21)  HR: 60 (06-03-24 @ 07:40)  BP: 153/62 (06-03-24 @ 06:00)  BP(mean): 87 (06-03-24 @ 06:00)  RR: 17 (06-03-24 @ 07:40)  SpO2: 100% (06-03-24 @ 07:40)  Wt(kg): --    I and O's:    06-01 @ 07:01  -  06-02 @ 07:00  --------------------------------------------------------  IN: 1560 mL / OUT: 0 mL / NET: 1560 mL    06-02 @ 07:01  -  06-03 @ 07:00  --------------------------------------------------------  IN: 1640 mL / OUT: 0 mL / NET: 1640 mL          PHYSICAL EXAM:    Constitutional: NAD  HEENT: PERRLA,   Neck: No JVD  Respiratory: CTA B/L  Cardiovascular: S1 and S2  Gastrointestinal: BS+, soft, NT/ND  Extremities: No peripheral edema  Neurological: A/O x 3, no focal deficits  Psychiatric: Normal mood, normal affect  : No Abbasi  Skin: No rashes  Access: Not applicable  Back: No CVA tenderness    LABS:                        7.7    11.20 )-----------( 466      ( 03 Jun 2024 05:35 )             23.2     06-03    133<L>  |  92<L>  |  63<H>  ----------------------------<  177<H>  4.4   |  28  |  6.00<H>    Ca    8.6      03 Jun 2024 05:35  Phos  3.0     06-03  Mg     2.30     06-03            RADIOLOGY & ADDITIONAL STUDIES:

## 2024-06-03 NOTE — PROGRESS NOTE ADULT - NS ATTEND AMEND GEN_ALL_CORE FT
agree with above  pt doing well, off vent, t piece  vascular f/u appreciated, planned for OR  PMR appreciated, acute rehab planning

## 2024-06-03 NOTE — PROGRESS NOTE ADULT - SUBJECTIVE AND OBJECTIVE BOX
VASCULAR SURGERY DAILY PROGRESS NOTE:     SUBJECTIVE/ROS:   Patient seen and evaluated on AM rounds.       OBJECTIVE:  Vital Signs Last 24 Hrs  T(C): 36.6 (03 Jun 2024 13:40), Max: 36.9 (02 Jun 2024 15:21)  T(F): 97.8 (03 Jun 2024 13:40), Max: 98.4 (02 Jun 2024 15:21)  HR: 63 (03 Jun 2024 13:40) (57 - 68)  BP: 165/57 (03 Jun 2024 13:40) (153/62 - 185/62)  BP(mean): 87 (03 Jun 2024 06:00) (85 - 87)  RR: 20 (03 Jun 2024 13:40) (17 - 20)  SpO2: 100% (03 Jun 2024 13:40) (97% - 100%)    Parameters below as of 03 Jun 2024 13:40  Patient On (Oxygen Delivery Method): t piece  O2 Flow (L/min): 6  O2 Concentration (%): 28  I&O's Detail    02 Jun 2024 07:01  -  03 Jun 2024 07:00  --------------------------------------------------------  IN:    Enteral Tube Flush: 560 mL    Nepro: 1080 mL  Total IN: 1640 mL    OUT:  Total OUT: 0 mL    Total NET: 1640 mL        Daily     Daily   MEDICATIONS  (STANDING):  albuterol    0.083% 2.5 milliGRAM(s) Nebulizer every 6 hours  amLODIPine   Tablet 10 milliGRAM(s) Oral daily  atorvastatin 20 milliGRAM(s) Oral at bedtime  carvedilol 25 milliGRAM(s) Oral every 12 hours  chlorhexidine 0.12% Liquid 15 milliLiter(s) Oral Mucosa every 12 hours  chlorhexidine 2% Cloths 1 Application(s) Topical <User Schedule>  dextrose 10% Bolus 125 milliLiter(s) IV Bolus once  dextrose 5%. 1000 milliLiter(s) (100 mL/Hr) IV Continuous <Continuous>  dextrose 5%. 1000 milliLiter(s) (50 mL/Hr) IV Continuous <Continuous>  dextrose 50% Injectable 25 Gram(s) IV Push once  dextrose 50% Injectable 12.5 Gram(s) IV Push once  epoetin reilly (EPOGEN) Injectable 50933 Unit(s) IV Push <User Schedule>  ferrous    sulfate Liquid 300 milliGRAM(s) Enteral Tube daily  glucagon  Injectable 1 milliGRAM(s) IntraMuscular once  heparin  Infusion. 700 Unit(s)/Hr (7 mL/Hr) IV Continuous <Continuous>  hydrALAZINE 100 milliGRAM(s) Oral three times a day  insulin lispro (ADMELOG) corrective regimen sliding scale   SubCutaneous every 6 hours  insulin NPH human recombinant 4 Unit(s) SubCutaneous every 6 hours  pantoprazole  Injectable 40 milliGRAM(s) IV Push every 12 hours    MEDICATIONS  (PRN):  acetaminophen   Oral Liquid .. 650 milliGRAM(s) Oral every 6 hours PRN Temp greater or equal to 38C (100.4F), Moderate Pain (4 - 6)  dextrose Oral Gel 15 Gram(s) Oral once PRN Blood Glucose LESS THAN 70 milliGRAM(s)/deciliter  hydrALAZINE Injectable 10 milliGRAM(s) IV Push every 8 hours PRN SBP > 180  melatonin 3 milliGRAM(s) Oral at bedtime PRN Insomnia  sodium chloride 0.9% lock flush 10 milliLiter(s) IV Push every 1 hour PRN Pre/post blood products, medications, blood draw, and to maintain line patency      Labs:                        7.7    11.20 )-----------( 466      ( 03 Jun 2024 05:35 )             23.2     06-03    133<L>  |  92<L>  |  63<H>  ----------------------------<  177<H>  4.4   |  28  |  6.00<H>    Ca    8.6      03 Jun 2024 05:35  Phos  3.0     06-03  Mg     2.30     06-03      PTT - ( 03 Jun 2024 11:25 )  PTT:87.0 sec  Urinalysis Basic - ( 03 Jun 2024 05:35 )    Color: x / Appearance: x / SG: x / pH: x  Gluc: 177 mg/dL / Ketone: x  / Bili: x / Urobili: x   Blood: x / Protein: x / Nitrite: x   Leuk Esterase: x / RBC: x / WBC x   Sq Epi: x / Non Sq Epi: x / Bacteria: x        Physical Exam:  General: NAD, resting comfortably in bed  HEENT: Normocephalic atraumatic  Respiratory: Nonlabored respirations  Cardio: regular rate  Vascular: R AVF w/ palpable thrill, palpable radial pulses b/l, R IJ shiley in place

## 2024-06-04 LAB
ANION GAP SERPL CALC-SCNC: 13 MMOL/L — SIGNIFICANT CHANGE UP (ref 7–14)
APTT BLD: 29.2 SEC — SIGNIFICANT CHANGE UP (ref 24.5–35.6)
APTT BLD: 29.6 SEC — SIGNIFICANT CHANGE UP (ref 24.5–35.6)
BASOPHILS # BLD AUTO: 0.1 K/UL — SIGNIFICANT CHANGE UP (ref 0–0.2)
BASOPHILS NFR BLD AUTO: 0.9 % — SIGNIFICANT CHANGE UP (ref 0–2)
BLD GP AB SCN SERPL QL: NEGATIVE — SIGNIFICANT CHANGE UP
BUN SERPL-MCNC: 41 MG/DL — HIGH (ref 7–23)
CALCIUM SERPL-MCNC: 8.7 MG/DL — SIGNIFICANT CHANGE UP (ref 8.4–10.5)
CHLORIDE SERPL-SCNC: 96 MMOL/L — LOW (ref 98–107)
CO2 SERPL-SCNC: 26 MMOL/L — SIGNIFICANT CHANGE UP (ref 22–31)
CREAT SERPL-MCNC: 4.13 MG/DL — HIGH (ref 0.5–1.3)
CULTURE RESULTS: SIGNIFICANT CHANGE UP
CULTURE RESULTS: SIGNIFICANT CHANGE UP
EGFR: 15 ML/MIN/1.73M2 — LOW
EOSINOPHIL # BLD AUTO: 0.26 K/UL — SIGNIFICANT CHANGE UP (ref 0–0.5)
EOSINOPHIL NFR BLD AUTO: 2.5 % — SIGNIFICANT CHANGE UP (ref 0–6)
GLUCOSE BLDC GLUCOMTR-MCNC: 116 MG/DL — HIGH (ref 70–99)
GLUCOSE BLDC GLUCOMTR-MCNC: 136 MG/DL — HIGH (ref 70–99)
GLUCOSE BLDC GLUCOMTR-MCNC: 143 MG/DL — HIGH (ref 70–99)
GLUCOSE BLDC GLUCOMTR-MCNC: 143 MG/DL — HIGH (ref 70–99)
GLUCOSE BLDC GLUCOMTR-MCNC: 149 MG/DL — HIGH (ref 70–99)
GLUCOSE BLDC GLUCOMTR-MCNC: 155 MG/DL — HIGH (ref 70–99)
GLUCOSE BLDC GLUCOMTR-MCNC: 221 MG/DL — HIGH (ref 70–99)
GLUCOSE SERPL-MCNC: 155 MG/DL — HIGH (ref 70–99)
HBV CORE AB SER-ACNC: SIGNIFICANT CHANGE UP
HBV SURFACE AB SER-ACNC: <3 MIU/ML — LOW
HBV SURFACE AG SER-ACNC: SIGNIFICANT CHANGE UP
HCT VFR BLD CALC: 22.3 % — LOW (ref 39–50)
HCV AB S/CO SERPL IA: 0.09 S/CO — SIGNIFICANT CHANGE UP (ref 0–0.99)
HCV AB SERPL-IMP: SIGNIFICANT CHANGE UP
HGB BLD-MCNC: 7.2 G/DL — LOW (ref 13–17)
IANC: 7.87 K/UL — HIGH (ref 1.8–7.4)
IMM GRANULOCYTES NFR BLD AUTO: 3.9 % — HIGH (ref 0–0.9)
INR BLD: 1.06 RATIO — SIGNIFICANT CHANGE UP (ref 0.85–1.18)
LYMPHOCYTES # BLD AUTO: 1.13 K/UL — SIGNIFICANT CHANGE UP (ref 1–3.3)
LYMPHOCYTES # BLD AUTO: 10.7 % — LOW (ref 13–44)
MAGNESIUM SERPL-MCNC: 2.2 MG/DL — SIGNIFICANT CHANGE UP (ref 1.6–2.6)
MCHC RBC-ENTMCNC: 21.8 PG — LOW (ref 27–34)
MCHC RBC-ENTMCNC: 32.3 GM/DL — SIGNIFICANT CHANGE UP (ref 32–36)
MCV RBC AUTO: 67.4 FL — LOW (ref 80–100)
MONOCYTES # BLD AUTO: 0.83 K/UL — SIGNIFICANT CHANGE UP (ref 0–0.9)
MONOCYTES NFR BLD AUTO: 7.8 % — SIGNIFICANT CHANGE UP (ref 2–14)
NEUTROPHILS # BLD AUTO: 7.87 K/UL — HIGH (ref 1.8–7.4)
NEUTROPHILS NFR BLD AUTO: 74.2 % — SIGNIFICANT CHANGE UP (ref 43–77)
NRBC # BLD: 0 /100 WBCS — SIGNIFICANT CHANGE UP (ref 0–0)
NRBC # FLD: 0.08 K/UL — HIGH (ref 0–0)
PHOSPHATE SERPL-MCNC: 2.1 MG/DL — LOW (ref 2.5–4.5)
PLATELET # BLD AUTO: 465 K/UL — HIGH (ref 150–400)
POTASSIUM SERPL-MCNC: 4 MMOL/L — SIGNIFICANT CHANGE UP (ref 3.5–5.3)
POTASSIUM SERPL-SCNC: 4 MMOL/L — SIGNIFICANT CHANGE UP (ref 3.5–5.3)
PROTHROM AB SERPL-ACNC: 11.9 SEC — SIGNIFICANT CHANGE UP (ref 9.5–13)
RBC # BLD: 3.31 M/UL — LOW (ref 4.2–5.8)
RBC # FLD: 22.5 % — HIGH (ref 10.3–14.5)
RH IG SCN BLD-IMP: POSITIVE — SIGNIFICANT CHANGE UP
SODIUM SERPL-SCNC: 135 MMOL/L — SIGNIFICANT CHANGE UP (ref 135–145)
SPECIMEN SOURCE: SIGNIFICANT CHANGE UP
SPECIMEN SOURCE: SIGNIFICANT CHANGE UP
WBC # BLD: 10.6 K/UL — HIGH (ref 3.8–10.5)
WBC # FLD AUTO: 10.6 K/UL — HIGH (ref 3.8–10.5)

## 2024-06-04 PROCEDURE — 76937 US GUIDE VASCULAR ACCESS: CPT | Mod: 26

## 2024-06-04 PROCEDURE — 99233 SBSQ HOSP IP/OBS HIGH 50: CPT

## 2024-06-04 PROCEDURE — 36902 INTRO CATH DIALYSIS CIRCUIT: CPT | Mod: RT

## 2024-06-04 DEVICE — KIT RADIAL MINI ACCESS PRELUDE SHEATH: Type: IMPLANTABLE DEVICE | Status: FUNCTIONAL

## 2024-06-04 DEVICE — GWIRE VASC ENTRY MINISTICK MAX 4FRX10CM: Type: IMPLANTABLE DEVICE | Status: FUNCTIONAL

## 2024-06-04 DEVICE — BLLN MUSTANG 6X60MMX75CM: Type: IMPLANTABLE DEVICE | Status: FUNCTIONAL

## 2024-06-04 DEVICE — IMPLANTABLE DEVICE: Type: IMPLANTABLE DEVICE | Status: FUNCTIONAL

## 2024-06-04 DEVICE — CATH ANGIO GLIDECATH ANGLE TAPER 5FR X 65CM: Type: IMPLANTABLE DEVICE | Status: FUNCTIONAL

## 2024-06-04 DEVICE — GWIRE ANGL .035X180 STD: Type: IMPLANTABLE DEVICE | Status: FUNCTIONAL

## 2024-06-04 RX ADMIN — PANTOPRAZOLE SODIUM 40 MILLIGRAM(S): 20 TABLET, DELAYED RELEASE ORAL at 05:05

## 2024-06-04 RX ADMIN — Medication 4: at 00:20

## 2024-06-04 RX ADMIN — Medication 100 MILLIGRAM(S): at 11:47

## 2024-06-04 RX ADMIN — HEPARIN SODIUM 900 UNIT(S)/HR: 5000 INJECTION INTRAVENOUS; SUBCUTANEOUS at 03:39

## 2024-06-04 RX ADMIN — ALBUTEROL 2.5 MILLIGRAM(S): 90 AEROSOL, METERED ORAL at 03:58

## 2024-06-04 RX ADMIN — Medication 2: at 23:54

## 2024-06-04 RX ADMIN — AMLODIPINE BESYLATE 10 MILLIGRAM(S): 2.5 TABLET ORAL at 05:06

## 2024-06-04 RX ADMIN — ATORVASTATIN CALCIUM 20 MILLIGRAM(S): 80 TABLET, FILM COATED ORAL at 21:49

## 2024-06-04 RX ADMIN — Medication 100 MILLIGRAM(S): at 20:57

## 2024-06-04 RX ADMIN — ALBUTEROL 2.5 MILLIGRAM(S): 90 AEROSOL, METERED ORAL at 21:58

## 2024-06-04 RX ADMIN — HUMAN INSULIN 4 UNIT(S): 100 INJECTION, SUSPENSION SUBCUTANEOUS at 00:21

## 2024-06-04 RX ADMIN — CHLORHEXIDINE GLUCONATE 1 APPLICATION(S): 213 SOLUTION TOPICAL at 05:04

## 2024-06-04 RX ADMIN — Medication 300 MILLIGRAM(S): at 11:48

## 2024-06-04 RX ADMIN — Medication 100 MILLIGRAM(S): at 04:43

## 2024-06-04 RX ADMIN — HUMAN INSULIN 4 UNIT(S): 100 INJECTION, SUSPENSION SUBCUTANEOUS at 23:55

## 2024-06-04 RX ADMIN — HEPARIN SODIUM 900 UNIT(S)/HR: 5000 INJECTION INTRAVENOUS; SUBCUTANEOUS at 06:58

## 2024-06-04 RX ADMIN — ALBUTEROL 2.5 MILLIGRAM(S): 90 AEROSOL, METERED ORAL at 10:37

## 2024-06-04 RX ADMIN — CARVEDILOL PHOSPHATE 25 MILLIGRAM(S): 80 CAPSULE, EXTENDED RELEASE ORAL at 05:06

## 2024-06-04 RX ADMIN — Medication 63.75 MILLIMOLE(S): at 22:30

## 2024-06-04 NOTE — PROGRESS NOTE ADULT - SUBJECTIVE AND OBJECTIVE BOX
Cardiovascular Disease Progress Note  DATE OF SERVICE: 24 @ 08:27    Overnight events: No acute events overnight.    The patient is breathing comfortably on trach collar.        Objective Findings:  T(C): 36.8 (24 @ 06:00), Max: 37.1 (24 @ 16:15)  HR: 60 (24 @ 07:45) (60 - 73)  BP: 128/55 (24 @ 06:00) (123/46 - 172/57)  RR: 16 (24 @ 07:45) (16 - 20)  SpO2: 100% (24 @ 07:45) (98% - 100%)  Wt(kg): --  Daily     Daily Weight in k.2 (2024 19:15)      Physical Exam:  Gen: NAD; Patient resting comfortably  HEENT: EOMI, Normocephalic/ atraumatic  CV: RRR, normal S1 + S2, no m/r/g  Lungs:  Normal respiratory effort; clear to auscultation bilaterally  Abd: soft, non-tender; bowel sounds present  Ext: No edema; warm and well perfused    Telemetry: n/a    Laboratory Data:                        7.2    10.60 )-----------( 465      ( 2024 02:21 )             22.3     06-04    135  |  96<L>  |  41<H>  ----------------------------<  155<H>  4.0   |  26  |  4.13<H>    Ca    8.7      2024 02:21  Phos  2.1     06-04  Mg     2.20     06-04      PT/INR - ( 2024 02:21 )   PT: 11.9 sec;   INR: 1.06 ratio         PTT - ( 2024 03:35 )  PTT:29.6 sec          Inpatient Medications:  MEDICATIONS  (STANDING):  albuterol    0.083% 2.5 milliGRAM(s) Nebulizer every 6 hours  amLODIPine   Tablet 10 milliGRAM(s) Oral daily  atorvastatin 20 milliGRAM(s) Oral at bedtime  carvedilol 25 milliGRAM(s) Oral every 12 hours  chlorhexidine 0.12% Liquid 15 milliLiter(s) Oral Mucosa every 12 hours  chlorhexidine 2% Cloths 1 Application(s) Topical <User Schedule>  dextrose 10% Bolus 125 milliLiter(s) IV Bolus once  dextrose 5%. 1000 milliLiter(s) (100 mL/Hr) IV Continuous <Continuous>  dextrose 5%. 1000 milliLiter(s) (50 mL/Hr) IV Continuous <Continuous>  dextrose 50% Injectable 25 Gram(s) IV Push once  dextrose 50% Injectable 12.5 Gram(s) IV Push once  epoetin reilly (EPOGEN) Injectable 53413 Unit(s) IV Push <User Schedule>  ferrous    sulfate Liquid 300 milliGRAM(s) Enteral Tube daily  glucagon  Injectable 1 milliGRAM(s) IntraMuscular once  heparin  Infusion. 700 Unit(s)/Hr (7 mL/Hr) IV Continuous <Continuous>  hydrALAZINE 100 milliGRAM(s) Oral three times a day  insulin lispro (ADMELOG) corrective regimen sliding scale   SubCutaneous every 6 hours  insulin NPH human recombinant 4 Unit(s) SubCutaneous every 6 hours  pantoprazole  Injectable 40 milliGRAM(s) IV Push every 12 hours      Assessment: 73 year old man with HTN, HLD, T2DM on insulin, and ESRD on HD presents with supply demand ischemia and angina.    Plan of Care:    #Type II myocardial infarction-  Secondary to distributive shock earlier on admission.   The repeat echo shows no new wall motion abnormality.   I would not pursue cath at this time given chronic respiratory failure s/p trach .   The patient is optimized from a cardiac standpoint to proceed with vascular HD access intervention.   - Continue Coreg throughout the jin-operative period.       #HTN-  Readings much improved after amlodipine and Coreg were up titrated.       #ESRD-  HD as per renal     #DVT   - R common Iliac DVT  AC as per RCU.            Over 55 minutes spent on total encounter; more than 50% of the visit was spent counseling and/or coordinating care by the attending physician.      Geovani Bravo MD [facility]  Cardiovascular Disease  [phone]

## 2024-06-04 NOTE — PROGRESS NOTE ADULT - ASSESSMENT
74 yo Mw/ PMHx hypertension, ESRD on HD via R radiocephalic fistula (MWF), DM, HTN, p/w hiccups and peaked T waves, admitted to MICU for c/f baclofen toxicity. Patient received 1x HD on admission. R femoral shiley removed 5/2, had 2 RRT for AMS and failed HD via AVF 5/3. S/p emergent R IJ shiley placement 5/3, started CRRT which is now transitioned back to iHD. Vascular planning RUE fistulogram when medically optimized. Extubated to HFNC then reintubated 5/12 for c/f aspiration w/ hypoxic resp failure. S/p trach 5/14. Downgraded from MICU to RCU 5/16.    Recommendations:   - Plan for OR today, 6/5 for RUE fistulogram, thrombectomy, tentatively scheduled for 3:30PM     - Consent obtained, located in chart     - Pre-op: Keep NPO. Pre-op labs reviewed, S/p HD session yesterday      - Can hold heparin ggt on call to the OR      - Appreciate medical and cardiac risk stratification for OR   - Global care per primary       Vascular Surgery  [id number]

## 2024-06-04 NOTE — PROGRESS NOTE ADULT - SUBJECTIVE AND OBJECTIVE BOX
CHIEF COMPLAINT: Patient is a 73y old  Male who presents with a chief complaint of Unstable angina, abn EKG (04 Jun 2024 06:44)      INTERVAL EVENTS: No acute events overnight.    REVIEW OF SYSTEMS: Seen and evaluated by bedside during AM rounds.      Mode: standby, FiO2: 28      OBJECTIVE:  ICU Vital Signs Last 24 Hrs  T(C): 36.8 (04 Jun 2024 06:00), Max: 37.1 (03 Jun 2024 16:15)  T(F): 98.3 (04 Jun 2024 06:00), Max: 98.7 (03 Jun 2024 16:15)  HR: 60 (04 Jun 2024 07:37) (60 - 73)  BP: 128/55 (04 Jun 2024 06:00) (123/46 - 172/57)  BP(mean): 74 (04 Jun 2024 06:00) (68 - 74)  ABP: --  ABP(mean): --  RR: 18 (04 Jun 2024 06:00) (17 - 20)  SpO2: 100% (04 Jun 2024 07:37) (97% - 100%)    O2 Parameters below as of 04 Jun 2024 06:00  Patient On (Oxygen Delivery Method): tracheostomy collar  O2 Flow (L/min): 7  O2 Concentration (%): 20      Mode: standby, FiO2: 28    06-03 @ 07:01  -  06-04 @ 07:00  --------------------------------------------------------  IN: 825 mL / OUT: 2200 mL / NET: -1375 mL      CAPILLARY BLOOD GLUCOSE      POCT Blood Glucose.: 116 mg/dL (04 Jun 2024 05:13)      HOSPITAL MEDICATIONS:  MEDICATIONS  (STANDING):  albuterol    0.083% 2.5 milliGRAM(s) Nebulizer every 6 hours  amLODIPine   Tablet 10 milliGRAM(s) Oral daily  atorvastatin 20 milliGRAM(s) Oral at bedtime  carvedilol 25 milliGRAM(s) Oral every 12 hours  chlorhexidine 0.12% Liquid 15 milliLiter(s) Oral Mucosa every 12 hours  chlorhexidine 2% Cloths 1 Application(s) Topical <User Schedule>  dextrose 10% Bolus 125 milliLiter(s) IV Bolus once  dextrose 5%. 1000 milliLiter(s) (100 mL/Hr) IV Continuous <Continuous>  dextrose 5%. 1000 milliLiter(s) (50 mL/Hr) IV Continuous <Continuous>  dextrose 50% Injectable 25 Gram(s) IV Push once  dextrose 50% Injectable 12.5 Gram(s) IV Push once  epoetin reilly (EPOGEN) Injectable 13130 Unit(s) IV Push <User Schedule>  ferrous    sulfate Liquid 300 milliGRAM(s) Enteral Tube daily  glucagon  Injectable 1 milliGRAM(s) IntraMuscular once  heparin  Infusion. 700 Unit(s)/Hr (7 mL/Hr) IV Continuous <Continuous>  hydrALAZINE 100 milliGRAM(s) Oral three times a day  insulin lispro (ADMELOG) corrective regimen sliding scale   SubCutaneous every 6 hours  insulin NPH human recombinant 4 Unit(s) SubCutaneous every 6 hours  pantoprazole  Injectable 40 milliGRAM(s) IV Push every 12 hours    MEDICATIONS  (PRN):  acetaminophen   Oral Liquid .. 650 milliGRAM(s) Oral every 6 hours PRN Temp greater or equal to 38C (100.4F), Moderate Pain (4 - 6)  dextrose Oral Gel 15 Gram(s) Oral once PRN Blood Glucose LESS THAN 70 milliGRAM(s)/deciliter  hydrALAZINE Injectable 10 milliGRAM(s) IV Push every 8 hours PRN SBP > 180  melatonin 3 milliGRAM(s) Oral at bedtime PRN Insomnia  sodium chloride 0.9% lock flush 10 milliLiter(s) IV Push every 1 hour PRN Pre/post blood products, medications, blood draw, and to maintain line patency      PHYSICAL EXAMINATION  General: Alert and cooperative. Resting comfortably. No apparent distress noted. Dry mucous membrane noted. White conjunctiva, no icterus.   HEENT: Normocephalic/atraumatic. EOMI. No discharge, conjunctivitis, or scleral icterus. No ptosis. No discharge or sinus tenderness noted. Mucous membranes are pink without bleeding. Tonsils are not enlarged. Good dental hygiene noted. No facial asymmetry. Neck is supple with a full range of motion. No masses or tenderness. Trachea is midline. Lymph nodes are not enlarged.   Cardiovascular: Normal rate and regular rhythm. No murmur/gallop.   Respiratory: Breath sounds clear and equal bilaterally without rales, wheezing, or rhonchi. No accessory muscles used.   Abdomen: Soft, nontender, nondistended. Bowel sounds are present. No bruit. No hepatosplenomegaly or masses. No rebound or guarding.   Skin: Warm to touch. No rashes, wounds, or skin lesions noted.   Extremities: +2 dorsalis pedis pulse is noted for the lower extremities. Full range of motion on all four extremities. 5/5 motor strength noted in all extremities. No clubbing, cyanosis, edema, or varicose veins.   MSK: No swelling or deformity. Posture, body symmetry, and movements are appropriate.   Neuro: AxO x 3, CN II - XII grossly intact.    LABS:                        7.2    10.60 )-----------( 465      ( 04 Jun 2024 02:21 )             22.3     06-04    135  |  96<L>  |  41<H>  ----------------------------<  155<H>  4.0   |  26  |  4.13<H>    Ca    8.7      04 Jun 2024 02:21  Phos  2.1     06-04  Mg     2.20     06-04      PT/INR - ( 04 Jun 2024 02:21 )   PT: 11.9 sec;   INR: 1.06 ratio         PTT - ( 04 Jun 2024 03:35 )  PTT:29.6 sec  Urinalysis Basic - ( 04 Jun 2024 02:21 )    Color: x / Appearance: x / SG: x / pH: x  Gluc: 155 mg/dL / Ketone: x  / Bili: x / Urobili: x   Blood: x / Protein: x / Nitrite: x   Leuk Esterase: x / RBC: x / WBC x   Sq Epi: x / Non Sq Epi: x / Bacteria: x            PAST MEDICAL & SURGICAL HISTORY:  Diabetes      Benign essential HTN      HLD (hyperlipidemia)      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    REVIEW OF SYSTEMS: Seen and evaluated by bedside during AM rounds.      Mode: standby, FiO2: 28      OBJECTIVE:  ICU Vital Signs Last 24 Hrs  T(C): 36.8 (04 Jun 2024 06:00), Max: 37.1 (03 Jun 2024 16:15)  T(F): 98.3 (04 Jun 2024 06:00), Max: 98.7 (03 Jun 2024 16:15)  HR: 60 (04 Jun 2024 07:37) (60 - 73)  BP: 128/55 (04 Jun 2024 06:00) (123/46 - 172/57)  BP(mean): 74 (04 Jun 2024 06:00) (68 - 74)  RR: 18 (04 Jun 2024 06:00) (17 - 20)  SpO2: 100% (04 Jun 2024 07:37) (97% - 100%)    O2 Parameters below as of 04 Jun 2024 06:00  Patient On (Oxygen Delivery Method): tracheostomy collar  O2 Flow (L/min): 7  O2 Concentration (%): 20      Mode: standby, FiO2: 28    06-03 @ 07:01  -  06-04 @ 07:00  --------------------------------------------------------  IN: 825 mL / OUT: 2200 mL / NET: -1375 mL      CAPILLARY BLOOD GLUCOSE      POCT Blood Glucose.: 116 mg/dL (04 Jun 2024 05:13)      HOSPITAL MEDICATIONS:  MEDICATIONS  (STANDING):  albuterol    0.083% 2.5 milliGRAM(s) Nebulizer every 6 hours  amLODIPine   Tablet 10 milliGRAM(s) Oral daily  atorvastatin 20 milliGRAM(s) Oral at bedtime  carvedilol 25 milliGRAM(s) Oral every 12 hours  chlorhexidine 0.12% Liquid 15 milliLiter(s) Oral Mucosa every 12 hours  chlorhexidine 2% Cloths 1 Application(s) Topical <User Schedule>  dextrose 10% Bolus 125 milliLiter(s) IV Bolus once  dextrose 5%. 1000 milliLiter(s) (100 mL/Hr) IV Continuous <Continuous>  dextrose 5%. 1000 milliLiter(s) (50 mL/Hr) IV Continuous <Continuous>  dextrose 50% Injectable 25 Gram(s) IV Push once  dextrose 50% Injectable 12.5 Gram(s) IV Push once  epoetin reilly (EPOGEN) Injectable 05953 Unit(s) IV Push <User Schedule>  ferrous    sulfate Liquid 300 milliGRAM(s) Enteral Tube daily  glucagon  Injectable 1 milliGRAM(s) IntraMuscular once  heparin  Infusion. 700 Unit(s)/Hr (7 mL/Hr) IV Continuous <Continuous>  hydrALAZINE 100 milliGRAM(s) Oral three times a day  insulin lispro (ADMELOG) corrective regimen sliding scale   SubCutaneous every 6 hours  insulin NPH human recombinant 4 Unit(s) SubCutaneous every 6 hours  pantoprazole  Injectable 40 milliGRAM(s) IV Push every 12 hours    MEDICATIONS  (PRN):  acetaminophen   Oral Liquid .. 650 milliGRAM(s) Oral every 6 hours PRN Temp greater or equal to 38C (100.4F), Moderate Pain (4 - 6)  dextrose Oral Gel 15 Gram(s) Oral once PRN Blood Glucose LESS THAN 70 milliGRAM(s)/deciliter  hydrALAZINE Injectable 10 milliGRAM(s) IV Push every 8 hours PRN SBP > 180  melatonin 3 milliGRAM(s) Oral at bedtime PRN Insomnia  sodium chloride 0.9% lock flush 10 milliLiter(s) IV Push every 1 hour PRN Pre/post blood products, medications, blood draw, and to maintain line patency      PHYSICAL EXAMINATION  General: Alert and cooperative. Resting comfortably. No apparent distress noted. Dry mucous membrane noted. White conjunctiva, no icterus.   HEENT: Normocephalic/atraumatic. EOMI. No discharge, conjunctivitis, or scleral icterus. No ptosis. No discharge or sinus tenderness noted. Mucous membranes are pink without bleeding. Tonsils are not enlarged. Good dental hygiene noted. No facial asymmetry. Neck is supple with a full range of motion. No masses or tenderness. Trachea is midline. Lymph nodes are not enlarged.   Cardiovascular: Normal rate and regular rhythm. No murmur/gallop.   Respiratory: Breath sounds clear and equal bilaterally without rales, wheezing, or rhonchi. No accessory muscles used.   Abdomen: Soft, nontender, nondistended. Bowel sounds are present. No bruit. No hepatosplenomegaly or masses. No rebound or guarding.   Skin: Warm to touch. No rashes, wounds, or skin lesions noted.   Extremities: +2 dorsalis pedis pulse is noted for the lower extremities. Full range of motion on all four extremities. 5/5 motor strength noted in all extremities. No clubbing, cyanosis, edema, or varicose veins.   MSK: No swelling or deformity. Posture, body symmetry, and movements are appropriate.   Neuro: AxO x 3, CN II - XII grossly intact.    LABS:                        7.2    10.60 )-----------( 465      ( 04 Jun 2024 02:21 )             22.3     06-04    135  |  96<L>  |  41<H>  ----------------------------<  155<H>  4.0   |  26  |  4.13<H>    Ca    8.7      04 Jun 2024 02:21  Phos  2.1     06-04  Mg     2.20     06-04      PT/INR - ( 04 Jun 2024 02:21 )   PT: 11.9 sec;   INR: 1.06 ratio         PTT - ( 04 Jun 2024 03:35 )  PTT:29.6 sec  Urinalysis Basic - ( 04 Jun 2024 02:21 )    Color: x / Appearance: x / SG: x / pH: x  Gluc: 155 mg/dL / Ketone: x  / Bili: x / Urobili: x   Blood: x / Protein: x / Nitrite: x   Leuk Esterase: x / RBC: x / WBC x   Sq Epi: x / Non Sq Epi: x / Bacteria: x      PAST MEDICAL & SURGICAL HISTORY:  Diabetes      Benign essential HTN      HLD (hyperlipidemia)      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 Plan for fistulogram and fistula repair today.    REVIEW OF SYSTEMS: Seen and evaluated by bedside during AM rounds. Patient denies any acute complaints.    Mode: standby, FiO2: 28    OBJECTIVE:  ICU Vital Signs Last 24 Hrs  T(C): 36.8 (04 Jun 2024 06:00), Max: 37.1 (03 Jun 2024 16:15)  T(F): 98.3 (04 Jun 2024 06:00), Max: 98.7 (03 Jun 2024 16:15)  HR: 60 (04 Jun 2024 07:37) (60 - 73)  BP: 128/55 (04 Jun 2024 06:00) (123/46 - 172/57)  BP(mean): 74 (04 Jun 2024 06:00) (68 - 74)  RR: 18 (04 Jun 2024 06:00) (17 - 20)  SpO2: 100% (04 Jun 2024 07:37) (97% - 100%)    O2 Parameters below as of 04 Jun 2024 06:00  Patient On (Oxygen Delivery Method): tracheostomy collar  O2 Flow (L/min): 7  O2 Concentration (%): 20      Mode: standby, FiO2: 28    06-03 @ 07:01  -  06-04 @ 07:00  --------------------------------------------------------  IN: 825 mL / OUT: 2200 mL / NET: -1375 mL      CAPILLARY BLOOD GLUCOSE      POCT Blood Glucose.: 116 mg/dL (04 Jun 2024 05:13)      HOSPITAL MEDICATIONS:  MEDICATIONS  (STANDING):  albuterol    0.083% 2.5 milliGRAM(s) Nebulizer every 6 hours  amLODIPine   Tablet 10 milliGRAM(s) Oral daily  atorvastatin 20 milliGRAM(s) Oral at bedtime  carvedilol 25 milliGRAM(s) Oral every 12 hours  chlorhexidine 0.12% Liquid 15 milliLiter(s) Oral Mucosa every 12 hours  chlorhexidine 2% Cloths 1 Application(s) Topical <User Schedule>  dextrose 10% Bolus 125 milliLiter(s) IV Bolus once  dextrose 5%. 1000 milliLiter(s) (100 mL/Hr) IV Continuous <Continuous>  dextrose 5%. 1000 milliLiter(s) (50 mL/Hr) IV Continuous <Continuous>  dextrose 50% Injectable 25 Gram(s) IV Push once  dextrose 50% Injectable 12.5 Gram(s) IV Push once  epoetin reilly (EPOGEN) Injectable 42040 Unit(s) IV Push <User Schedule>  ferrous    sulfate Liquid 300 milliGRAM(s) Enteral Tube daily  glucagon  Injectable 1 milliGRAM(s) IntraMuscular once  heparin  Infusion. 700 Unit(s)/Hr (7 mL/Hr) IV Continuous <Continuous>  hydrALAZINE 100 milliGRAM(s) Oral three times a day  insulin lispro (ADMELOG) corrective regimen sliding scale   SubCutaneous every 6 hours  insulin NPH human recombinant 4 Unit(s) SubCutaneous every 6 hours  pantoprazole  Injectable 40 milliGRAM(s) IV Push every 12 hours    MEDICATIONS  (PRN):  acetaminophen   Oral Liquid .. 650 milliGRAM(s) Oral every 6 hours PRN Temp greater or equal to 38C (100.4F), Moderate Pain (4 - 6)  dextrose Oral Gel 15 Gram(s) Oral once PRN Blood Glucose LESS THAN 70 milliGRAM(s)/deciliter  hydrALAZINE Injectable 10 milliGRAM(s) IV Push every 8 hours PRN SBP > 180  melatonin 3 milliGRAM(s) Oral at bedtime PRN Insomnia  sodium chloride 0.9% lock flush 10 milliLiter(s) IV Push every 1 hour PRN Pre/post blood products, medications, blood draw, and to maintain line patency      PHYSICAL EXAMINATION  General: NAD   HEENT: R IJ SHILEY and trach present   Cards: S1/S2, no murmurs   Pulm: CTA bilaterally. No wheezes.   Abdomen: Soft, nondistended and nontender. BS (+) PEG present.   Extremities: No pedal edema. THAI of BL upper and lower extremities with weakness. RUE fistula with no thrill.     LABS:                        7.2    10.60 )-----------( 465      ( 04 Jun 2024 02:21 )             22.3     06-04    135  |  96<L>  |  41<H>  ----------------------------<  155<H>  4.0   |  26  |  4.13<H>    Ca    8.7      04 Jun 2024 02:21  Phos  2.1     06-04  Mg     2.20     06-04      PT/INR - ( 04 Jun 2024 02:21 )   PT: 11.9 sec;   INR: 1.06 ratio         PTT - ( 04 Jun 2024 03:35 )  PTT:29.6 sec  Urinalysis Basic - ( 04 Jun 2024 02:21 )    Color: x / Appearance: x / SG: x / pH: x  Gluc: 155 mg/dL / Ketone: x  / Bili: x / Urobili: x   Blood: x / Protein: x / Nitrite: x   Leuk Esterase: x / RBC: x / WBC x   Sq Epi: x / Non Sq Epi: x / Bacteria: x      PAST MEDICAL & SURGICAL HISTORY:  Diabetes      Benign essential HTN      HLD (hyperlipidemia)      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 Plan for fistulogram and fistula repair today.    REVIEW OF SYSTEMS: Seen and evaluated by bedside during AM rounds. Patient denies any acute complaints.    Mode: standby, FiO2: 28    OBJECTIVE:  ICU Vital Signs Last 24 Hrs  T(C): 36.8 (04 Jun 2024 06:00), Max: 37.1 (03 Jun 2024 16:15)  T(F): 98.3 (04 Jun 2024 06:00), Max: 98.7 (03 Jun 2024 16:15)  HR: 60 (04 Jun 2024 07:37) (60 - 73)  BP: 128/55 (04 Jun 2024 06:00) (123/46 - 172/57)  BP(mean): 74 (04 Jun 2024 06:00) (68 - 74)  RR: 18 (04 Jun 2024 06:00) (17 - 20)  SpO2: 100% (04 Jun 2024 07:37) (97% - 100%)    O2 Parameters below as of 04 Jun 2024 06:00  Patient On (Oxygen Delivery Method): tracheostomy collar  O2 Flow (L/min): 7  O2 Concentration (%): 20      Mode: standby, FiO2: 28    06-03 @ 07:01  -  06-04 @ 07:00  --------------------------------------------------------  IN: 825 mL / OUT: 2200 mL / NET: -1375 mL      CAPILLARY BLOOD GLUCOSE      POCT Blood Glucose.: 116 mg/dL (04 Jun 2024 05:13)      HOSPITAL MEDICATIONS:  MEDICATIONS  (STANDING):  albuterol    0.083% 2.5 milliGRAM(s) Nebulizer every 6 hours  amLODIPine   Tablet 10 milliGRAM(s) Oral daily  atorvastatin 20 milliGRAM(s) Oral at bedtime  carvedilol 25 milliGRAM(s) Oral every 12 hours  chlorhexidine 0.12% Liquid 15 milliLiter(s) Oral Mucosa every 12 hours  chlorhexidine 2% Cloths 1 Application(s) Topical <User Schedule>  dextrose 10% Bolus 125 milliLiter(s) IV Bolus once  dextrose 5%. 1000 milliLiter(s) (100 mL/Hr) IV Continuous <Continuous>  dextrose 5%. 1000 milliLiter(s) (50 mL/Hr) IV Continuous <Continuous>  dextrose 50% Injectable 25 Gram(s) IV Push once  dextrose 50% Injectable 12.5 Gram(s) IV Push once  epoetin reilly (EPOGEN) Injectable 50158 Unit(s) IV Push <User Schedule>  ferrous    sulfate Liquid 300 milliGRAM(s) Enteral Tube daily  glucagon  Injectable 1 milliGRAM(s) IntraMuscular once  heparin  Infusion. 700 Unit(s)/Hr (7 mL/Hr) IV Continuous <Continuous>  hydrALAZINE 100 milliGRAM(s) Oral three times a day  insulin lispro (ADMELOG) corrective regimen sliding scale   SubCutaneous every 6 hours  insulin NPH human recombinant 4 Unit(s) SubCutaneous every 6 hours  pantoprazole  Injectable 40 milliGRAM(s) IV Push every 12 hours    MEDICATIONS  (PRN):  acetaminophen   Oral Liquid .. 650 milliGRAM(s) Oral every 6 hours PRN Temp greater or equal to 38C (100.4F), Moderate Pain (4 - 6)  dextrose Oral Gel 15 Gram(s) Oral once PRN Blood Glucose LESS THAN 70 milliGRAM(s)/deciliter  hydrALAZINE Injectable 10 milliGRAM(s) IV Push every 8 hours PRN SBP > 180  melatonin 3 milliGRAM(s) Oral at bedtime PRN Insomnia  sodium chloride 0.9% lock flush 10 milliLiter(s) IV Push every 1 hour PRN Pre/post blood products, medications, blood draw, and to maintain line patency      PHYSICAL EXAMINATION  General: NAD   HEENT: R IJ SHILEY and trach present   Cards: S1/S2, no murmurs   Pulm: CTA bilaterally. No wheezes.   Abdomen: Soft, nondistended and nontender. BS (+) PEG present.   Extremities: No pedal edema. THAI of BL upper and lower extremities with weakness. RUE fistula with no thrill.     LABS:                        7.2    10.60 )-----------( 465      ( 04 Jun 2024 02:21 )             22.3     06-04    135  |  96<L>  |  41<H>  ----------------------------<  155<H>  4.0   |  26  |  4.13<H>    Ca    8.7      04 Jun 2024 02:21  Phos  2.1     06-04  Mg     2.20     06-04      PT/INR - ( 04 Jun 2024 02:21 )   PT: 11.9 sec;   INR: 1.06 ratio         PTT - ( 04 Jun 2024 03:35 )  PTT:29.6 sec  Urinalysis Basic - ( 04 Jun 2024 02:21 )    Color: x / Appearance: x / SG: x / pH: x  Gluc: 155 mg/dL / Ketone: x  / Bili: x / Urobili: x   Blood: x / Protein: x / Nitrite: x   Leuk Esterase: x / RBC: x / WBC x   Sq Epi: x / Non Sq Epi: x / Bacteria: x      PAST MEDICAL & SURGICAL HISTORY:  Diabetes    Benign essential HTN    HLD (hyperlipidemia)    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    ESRD on hemodialysis    Arteriovenous fistula    FAMILY HISTORY:  FHx: diabetes mellitus (Father, Aunt)    Social History:    RADIOLOGY:  [X] Reviewed and interpreted by me    PULMONARY FUNCTION TESTS:    EKG:

## 2024-06-04 NOTE — PROGRESS NOTE ADULT - SUBJECTIVE AND OBJECTIVE BOX
[facility] [place] [facility]   MD ELIANE BHAGAT MD ANGELA WONG, PA    TEL:  OFFICE: [phone]  From 5pm-7am Answering Service [phone]    -- RENAL FOLLOW UP NOTE ---Date of Service 06-04-24 @ 11:24    Patient is a 73y old  Male who presents with a chief complaint of Unstable angina, abn EKG (04 Jun 2024 08:26)      Patient seen and examined at bedside.     VITALS:  T(F): 98.4 (06-04-24 @ 08:01), Max: 98.7 (06-03-24 @ 16:15)  HR: 61 (06-04-24 @ 08:01)  BP: 139/51 (06-04-24 @ 08:01)  RR: 17 (06-04-24 @ 08:01)  SpO2: 98% (06-04-24 @ 08:01)  Wt(kg): --    06-03 @ 07:01  -  06-04 @ 07:00  --------------------------------------------------------  IN: 825 mL / OUT: 2200 mL / NET: -1375 mL          PHYSICAL EXAM:  General: NAD  Neck: +trach  Respiratory: + rhonchi  Cardiovascular: S1, S2, RRR  Gastrointestinal: +peg  Extremities: No peripheral edema    Hospital Medications:   MEDICATIONS  (STANDING):  albuterol    0.083% 2.5 milliGRAM(s) Nebulizer every 6 hours  amLODIPine   Tablet 10 milliGRAM(s) Oral daily  atorvastatin 20 milliGRAM(s) Oral at bedtime  carvedilol 25 milliGRAM(s) Oral every 12 hours  chlorhexidine 0.12% Liquid 15 milliLiter(s) Oral Mucosa every 12 hours  chlorhexidine 2% Cloths 1 Application(s) Topical <User Schedule>  dextrose 10% Bolus 125 milliLiter(s) IV Bolus once  dextrose 5%. 1000 milliLiter(s) (100 mL/Hr) IV Continuous <Continuous>  dextrose 5%. 1000 milliLiter(s) (50 mL/Hr) IV Continuous <Continuous>  dextrose 50% Injectable 25 Gram(s) IV Push once  dextrose 50% Injectable 12.5 Gram(s) IV Push once  epoetin reilly (EPOGEN) Injectable 86403 Unit(s) IV Push <User Schedule>  ferrous    sulfate Liquid 300 milliGRAM(s) Enteral Tube daily  glucagon  Injectable 1 milliGRAM(s) IntraMuscular once  heparin  Infusion. 700 Unit(s)/Hr (7 mL/Hr) IV Continuous <Continuous>  hydrALAZINE 100 milliGRAM(s) Oral three times a day  insulin lispro (ADMELOG) corrective regimen sliding scale   SubCutaneous every 6 hours  insulin NPH human recombinant 4 Unit(s) SubCutaneous every 6 hours  pantoprazole  Injectable 40 milliGRAM(s) IV Push every 12 hours  sodium phosphate 15 milliMole(s)/250 mL IVPB 15 milliMole(s) IV Intermittent once      LABS:  06-04    135  |  96<L>  |  41<H>  ----------------------------<  155<H>  4.0   |  26  |  4.13<H>    Ca    8.7      04 Jun 2024 02:21  Phos  2.1     06-04  Mg     2.20     06-04      Creatinine Trend: 4.13 <--, 6.00 <--, 5.26 <--, 4.19 <--, 6.99 <--, 6.43 <--, 5.82 <--, 5.29 <--    Phosphorus: 2.1 mg/dL (06-04 @ 02:21)                              7.2    10.60 )-----------( 465      ( 04 Jun 2024 02:21 )             22.3     Urine Studies:  Urinalysis - [06-04-24 @ 02:21]      Color  / Appearance  / SG  / pH       Gluc 155 / Ketone   / Bili  / Urobili        Blood  / Protein  / Leuk Est  / Nitrite       RBC  / WBC  / Hyaline  / Gran  / Sq Epi  / Non Sq Epi  / Bacteria       Iron 16, TIBC 121, %sat 13      [05-17-24 @ 06:00]  Ferritin 720      [05-17-24 @ 06:00]  PTH -- (Ca --)      [05-26-24 @ 01:20]   122  TSH 2.60      [05-17-24 @ 06:00]  Lipid: chol 86, , HDL 27, LDL --      [05-17-24 @ 06:00]    HBsAb <3.0      [06-03-24 @ 16:20]  HBsAg Nonreact      [06-03-24 @ 16:20]  HBcAb Nonreact      [06-03-24 @ 16:20]  HCV 0.09, Nonreact      [06-03-24 @ 16:20]      RADIOLOGY & ADDITIONAL STUDIES:

## 2024-06-04 NOTE — BRIEF OPERATIVE NOTE - NSICDXBRIEFPROCEDURE_GEN_ALL_CORE_FT
PROCEDURES:  EGD, with PEG 17-May-2024 12:41:11  Mirela Smallwood  
PROCEDURES:  AV fistulogram, with angioplasty if indicated 04-Jun-2024 19:52:04  Jadiel Goss

## 2024-06-04 NOTE — CHART NOTE - NSCHARTNOTEFT_GEN_A_CORE
SURGERY POST OP CHECK    STATUS POST PROCEDURE:    SUBJECTIVE: Pt seen and examined without complaints. Pain is controlled. Denies CP/SOB/N/V.       OBJECTIVE:  Vital Signs Last 24 Hrs  T(C): 36.1 (04 Jun 2024 22:00), Max: 37.1 (04 Jun 2024 20:05)  T(F): 96.9 (04 Jun 2024 22:00), Max: 98.7 (04 Jun 2024 20:05)  HR: 54 (04 Jun 2024 23:01) (54 - 80)  BP: 149/60 (04 Jun 2024 22:00) (128/55 - 172/58)  BP(mean): 85 (04 Jun 2024 22:00) (74 - 90)  RR: 18 (04 Jun 2024 22:00) (12 - 20)  SpO2: 99% (04 Jun 2024 23:01) (97% - 100%)    Parameters below as of 04 Jun 2024 22:00  Patient On (Oxygen Delivery Method): tracheostomy collar  O2 Flow (L/min): 5  O2 Concentration (%): 28  I&O's Summary    03 Jun 2024 07:01  -  04 Jun 2024 07:00  --------------------------------------------------------  IN: 825 mL / OUT: 2200 mL / NET: -1375 mL    Physical Exam:  General: NAD, resting comfortably in bed  HEENT: Normocephalic atraumatic  Respiratory: Nonlabored respirations  RUE dressing c/d/i. Access site soft, no evidence of hematoma. Palpable thrill over fistula.     ASSESSMENT/PLAN: HPI:  73-year-old male past medical history hypertension, ESRD on dialysis Monday Wednesday Friday, diabetes insulin-dependent presents with hiccups patient endorses persistent hiccups for the last 2 days. found to have peaked T waves, a new finding.    Now s/p RUE fistulogram w balloon angioplasty of outflow stenosis.     Plan   RUE AVF can be accessed for dialysis tomorrow   Rest of care per primary team     C Team   [id number]

## 2024-06-04 NOTE — BRIEF OPERATIVE NOTE - NSICDXBRIEFPOSTOP_GEN_ALL_CORE_FT
POST-OP DIAGNOSIS:  Stenosis of AV fistula, subsequent encounter 04-Jun-2024 19:52:50  Jadiel Goss

## 2024-06-04 NOTE — PROGRESS NOTE ADULT - SUBJECTIVE AND OBJECTIVE BOX
Patient is a 73y Male     Patient is a 73y old  Male who presents with a chief complaint of Unstable angina, abn EKG (04 Jun 2024 05:21)      HPI:  73-year-old male past medical history hypertension, ESRD on dialysis Monday Wednesday Friday, diabetes insulin-dependent presents with hiccups patient endorses persistent hiccups for the last 2 days. found to have peaked T waves, a new finding. denies dizziness, N/V, denies CP, palps, abd pain (29 Apr 2024 21:15)      PAST MEDICAL & SURGICAL HISTORY:  Diabetes      Benign essential HTN      HLD (hyperlipidemia)      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      ESRD on hemodialysis      Arteriovenous fistula          MEDICATIONS  (STANDING):  albuterol    0.083% 2.5 milliGRAM(s) Nebulizer every 6 hours  amLODIPine   Tablet 10 milliGRAM(s) Oral daily  atorvastatin 20 milliGRAM(s) Oral at bedtime  carvedilol 25 milliGRAM(s) Oral every 12 hours  chlorhexidine 0.12% Liquid 15 milliLiter(s) Oral Mucosa every 12 hours  chlorhexidine 2% Cloths 1 Application(s) Topical <User Schedule>  dextrose 10% Bolus 125 milliLiter(s) IV Bolus once  dextrose 5%. 1000 milliLiter(s) (100 mL/Hr) IV Continuous <Continuous>  dextrose 5%. 1000 milliLiter(s) (50 mL/Hr) IV Continuous <Continuous>  dextrose 50% Injectable 25 Gram(s) IV Push once  dextrose 50% Injectable 12.5 Gram(s) IV Push once  epoetin reilly (EPOGEN) Injectable 41171 Unit(s) IV Push <User Schedule>  ferrous    sulfate Liquid 300 milliGRAM(s) Enteral Tube daily  glucagon  Injectable 1 milliGRAM(s) IntraMuscular once  heparin  Infusion. 700 Unit(s)/Hr (7 mL/Hr) IV Continuous <Continuous>  hydrALAZINE 100 milliGRAM(s) Oral three times a day  insulin lispro (ADMELOG) corrective regimen sliding scale   SubCutaneous every 6 hours  insulin NPH human recombinant 4 Unit(s) SubCutaneous every 6 hours  pantoprazole  Injectable 40 milliGRAM(s) IV Push every 12 hours      Allergies    No Known Allergies    Intolerances        SOCIAL HISTORY:  Denies ETOh,Smoking,     FAMILY HISTORY:  FHx: diabetes mellitus (Father, Aunt)        REVIEW OF SYSTEMS:    CONSTITUTIONAL: No weakness, fevers or chills  EYES/ENT: No visual changes;  No vertigo or throat pain   NECK: No pain or stiffness  RESPIRATORY: No cough, wheezing, hemoptysis; No shortness of breath  CARDIOVASCULAR: No chest pain or palpitations  GASTROINTESTINAL: No abdominal or epigastric pain. No nausea, vomiting, or hematemesis; No diarrhea or constipation. No melena or hematochezia.  GENITOURINARY: No dysuria, frequency or hematuria  NEUROLOGICAL: No numbness or weakness  SKIN: No itching, burning, rashes, or lesions   All other review of systems is negative unless indicated above.    VITAL:  T(C): , Max: 37.1 (06-03-24 @ 16:15)  T(F): , Max: 98.7 (06-03-24 @ 16:15)  HR: 60 (06-04-24 @ 06:00)  BP: 128/55 (06-04-24 @ 06:00)  BP(mean): 74 (06-04-24 @ 06:00)  RR: 18 (06-04-24 @ 06:00)  SpO2: 98% (06-04-24 @ 06:00)  Wt(kg): --    I and O's:    06-01 @ 07:01  -  06-02 @ 07:00  --------------------------------------------------------  IN: 1560 mL / OUT: 0 mL / NET: 1560 mL    06-02 @ 07:01  -  06-03 @ 07:00  --------------------------------------------------------  IN: 1640 mL / OUT: 0 mL / NET: 1640 mL    06-03 @ 07:01  -  06-04 @ 06:44  --------------------------------------------------------  IN: 825 mL / OUT: 2200 mL / NET: -1375 mL          PHYSICAL EXAM:    Constitutional: NAD  HEENT: PERRLA,   Neck: No JVD  Respiratory: CTA B/L  Cardiovascular: S1 and S2  Gastrointestinal: BS+, soft, NT/ND  Extremities: No peripheral edema  Neurological: A/O x 3, no focal deficits  Psychiatric: Normal mood, normal affect  : No Abbasi  Skin: No rashes  Access: Not applicable  Back: No CVA tenderness    LABS:                        7.2    10.60 )-----------( 465      ( 04 Jun 2024 02:21 )             22.3     06-04    135  |  96<L>  |  41<H>  ----------------------------<  155<H>  4.0   |  26  |  4.13<H>    Ca    8.7      04 Jun 2024 02:21  Phos  2.1     06-04  Mg     2.20     06-04            RADIOLOGY & ADDITIONAL STUDIES:

## 2024-06-04 NOTE — PROGRESS NOTE ADULT - SUBJECTIVE AND OBJECTIVE BOX
[facility], Division of Infectious Diseases  AUGUST Carbajal Y. Patel, S. Shah, G. Brian  [phone]  ([phone] - weekdays after 5pm and weekends)    Name: JIMMY BLAND  Age/Gender: 73y Male  MRN: [id number]    Interval History:  Notes reviewed.   No concerning overnight events.  Afebrile.   shakes his head no when asked about SOB    Allergies: No Known Allergies      Objective:  Vitals:   T(F): 98.4 (06-04-24 @ 08:01), Max: 98.7 (06-03-24 @ 16:15)  HR: 61 (06-04-24 @ 08:01) (60 - 73)  BP: 139/51 (06-04-24 @ 08:01) (123/46 - 172/57)  RR: 17 (06-04-24 @ 08:01) (16 - 20)  SpO2: 98% (06-04-24 @ 08:01) (98% - 100%)  Physical Examination:  General: no acute distress  HEENT: tracheostomy  Heart: normal rate  Lungs: clear to auscultation anteriorly, breathing comfortably on trach collar  Abdomen: Soft. ND. NT  Extremities: No LE edema.   Skin: Warm. Dry.     Laboratory Studies:  CBC:                       7.2    10.60 )-----------( 465      ( 04 Jun 2024 02:21 )             22.3     WBC Trend:  10.60 06-04-24 @ 02:21  11.20 06-03-24 @ 05:35  10.33 06-02-24 @ 23:15  9.43 06-02-24 @ 17:47  9.46 06-02-24 @ 06:00  11.03 06-01-24 @ 06:00  10.08 05-31-24 @ 05:10  10.31 05-30-24 @ 18:10  8.79 05-30-24 @ 06:57  10.18 05-29-24 @ 18:40  10.59 05-29-24 @ 05:10  7.69 05-28-24 @ 19:00    CMP: 06-04    135  |  96<L>  |  41<H>  ----------------------------<  155<H>  4.0   |  26  |  4.13<H>    Ca    8.7      04 Jun 2024 02:21  Phos  2.1     06-04  Mg     2.20     06-04            Urinalysis Basic - ( 04 Jun 2024 02:21 )    Color: x / Appearance: x / SG: x / pH: x  Gluc: 155 mg/dL / Ketone: x  / Bili: x / Urobili: x   Blood: x / Protein: x / Nitrite: x   Leuk Esterase: x / RBC: x / WBC x   Sq Epi: x / Non Sq Epi: x / Bacteria: x      Microbiology: reviewed     Culture - Sputum (collected 05-30-24 @ 11:44)  Source: .Sputum Sputum  Gram Stain (05-30-24 @ 19:45):    Moderate polymorphonuclear leukocytes per low power field    Few Squamous epithelial cells per low power field    Few Gram Negative Rods per oil power field  Final Report (06-01-24 @ 12:02):    Moderate Citrobacter koseri    Normal Respiratory Jocelyn present  Organism: Citrobacter koseri (06-01-24 @ 12:02)  Organism: Citrobacter koseri (06-01-24 @ 12:02)      Method Type: VIDA      -  Amoxicillin/Clavulanic Acid: S <=8/4      -  Ampicillin: R >16 These ampicillin results predict results for amoxicillin      -  Ampicillin/Sulbactam: S <=4/2      -  Aztreonam: S <=4      -  Cefazolin: S <=2      -  Cefepime: S <=2      -  Cefoxitin: S <=8      -  Ceftriaxone: S <=1      -  Ciprofloxacin: S <=0.25      -  Ertapenem: S <=0.5      -  Gentamicin: S <=2      -  Imipenem: S <=1      -  Levofloxacin: S <=0.5      -  Meropenem: S <=1      -  Piperacillin/Tazobactam: S <=8      -  Tobramycin: S <=2      -  Trimethoprim/Sulfamethoxazole: S <=0.5/9.5    Culture - Blood (collected 05-30-24 @ 09:03)  Source: .Blood Blood-Peripheral  Preliminary Report (06-03-24 @ 15:01):    No growth at 4 days    Culture - Blood (collected 05-30-24 @ 08:48)  Source: .Blood Blood-Venous  Preliminary Report (06-03-24 @ 15:01):    No growth at 4 days    Culture - Sputum (collected 05-25-24 @ 21:28)  Source: Trach Asp Tracheal Aspirate  Gram Stain (05-26-24 @ 13:57):    Few polymorphonuclear leukocytes per low power field    Rare Gram Negative Rods per oil power field  Final Report (05-27-24 @ 18:28):    Normal Respiratory Jocelyn present    Culture - Blood (collected 05-25-24 @ 19:00)  Source: .Blood Blood-Peripheral  Final Report (05-30-24 @ 22:01):    No growth at 5 days    Culture - Blood (collected 05-25-24 @ 18:45)  Source: .Blood Blood-Peripheral  Final Report (05-30-24 @ 22:01):    No growth at 5 days        Radiology: reviewed     Medications:  acetaminophen   Oral Liquid .. 650 milliGRAM(s) Oral every 6 hours PRN  albuterol    0.083% 2.5 milliGRAM(s) Nebulizer every 6 hours  amLODIPine   Tablet 10 milliGRAM(s) Oral daily  atorvastatin 20 milliGRAM(s) Oral at bedtime  carvedilol 25 milliGRAM(s) Oral every 12 hours  chlorhexidine 0.12% Liquid 15 milliLiter(s) Oral Mucosa every 12 hours  chlorhexidine 2% Cloths 1 Application(s) Topical <User Schedule>  dextrose 10% Bolus 125 milliLiter(s) IV Bolus once  dextrose 5%. 1000 milliLiter(s) IV Continuous <Continuous>  dextrose 5%. 1000 milliLiter(s) IV Continuous <Continuous>  dextrose 50% Injectable 25 Gram(s) IV Push once  dextrose 50% Injectable 12.5 Gram(s) IV Push once  dextrose Oral Gel 15 Gram(s) Oral once PRN  epoetin reilly (EPOGEN) Injectable 46613 Unit(s) IV Push <User Schedule>  ferrous    sulfate Liquid 300 milliGRAM(s) Enteral Tube daily  glucagon  Injectable 1 milliGRAM(s) IntraMuscular once  heparin  Infusion. 700 Unit(s)/Hr IV Continuous <Continuous>  hydrALAZINE 100 milliGRAM(s) Oral three times a day  hydrALAZINE Injectable 10 milliGRAM(s) IV Push every 8 hours PRN  insulin lispro (ADMELOG) corrective regimen sliding scale   SubCutaneous every 6 hours  insulin NPH human recombinant 4 Unit(s) SubCutaneous every 6 hours  melatonin 3 milliGRAM(s) Oral at bedtime PRN  pantoprazole  Injectable 40 milliGRAM(s) IV Push every 12 hours  sodium chloride 0.9% lock flush 10 milliLiter(s) IV Push every 1 hour PRN  sodium phosphate 15 milliMole(s)/250 mL IVPB 15 milliMole(s) IV Intermittent once    Antimicrobials:

## 2024-06-04 NOTE — PROGRESS NOTE ADULT - ASSESSMENT
72 y/o M PMhx HTN, ESRD (on HD M/W/F), DM who presented with hiccups x 2 days and chest pain found to have peaked T waves. Hospital course c/b AMS s/p RRT on 5/1 and 5/2. Vascular consulted 2/2 RUE AVF access unable to be used s/p R femoral shiley placed for emergent HD. Transferred to MICU and intubated 5/2 for airway protection.   received empiric course of zosyn x 5 days, completed 5/7 and meropenem x 5 days, completed 5/14  B. cereus bacteremia, s/p vanc, completed 5/20    fever- resolved, ?PNA  possible RLL consolidation on CXR  R non-tunneled HD catheter removed 5/27  blood cultures- NGTD  s/p zosyn x 7 days, completed 5/27    AMS, CVA  TTE- no evidence of valvular disease  s/p LP 5/2, viral encephalitis/ meningitis ruled out  MRI- Punctate foci of restricted diffusion in the left talya and left cerebellum with associated T2 prolongation consistent with acute infarcts  s/p trach 5/14, s/p PEG 5/17    DVT  CT 5/12- Nonocclusive R common iliac vein deep venous thrombosis.  US LE 5/28- no evidence of DVT    Recommendations  monitor off antibiotics  no objection to placing tunneled catheter  tentative plan for RUE fistulogram, thrombectomy today    Steven Torres M.D.  [facility], Division of Infectious Diseases  [phone]  After 5pm on weekdays and all day on weekends - please call [phone]

## 2024-06-04 NOTE — PACU DISCHARGE NOTE - COMMENTS
pt is trached and pegged and vent dependent, going back to the resp care unit.
No anesthesia complications

## 2024-06-04 NOTE — PROGRESS NOTE ADULT - ASSESSMENT
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 Baclofen started and course complicated by AMS second to baclofen toxicity requiring intubation and MICU transfer. Course further complicated by LEFT pontine and cerebellar stroke, R AVF stenosis, aspiration and B Cereus bacteremia and RLE DVT. s/p trach and transferred to RCU 5/16 and course complicated by intermittent fever spikes with likely aspiration citrobacter PNA, AOCD, and GIB vs Fe stools.     NEUROLOGY  # AMS   - MRI HEAD (5/6) with punctate foci in the left talya and left cerebellum with concern for acute infarct.   - MRA HEAD and NECK (5/6) with no large vessel occlusion or stenosis.  - Continue on aspirin and hold DAPT for now pending procedures   - Continue on Lipitor for medical management     PSYCH   # Depression   - Concern for depression with questionable SI   - CO started, however formal discussion and evaluation with no true SI   - Supportive care and encouragement QD    CARDIOVASCULAR  # CP with NSTEMI < demand ischemia with HTN   - Admitted for CP and HTN with peaked T WAVES and ECG with ST deviation on admission   - Troponins elevated on admission with negative CKMB   - TTE (4/30) with EF 72, normal LVRVSF, and no regional wall motion abnormalities   - Case discussed with cardiology and no acute need for cath.     # Septic vs vasoplegic shock  # Hx of HTN   - Home BP medications held and pressors weaned off   - Continue on coreg 25 (increased 6/1), hydralazine 100 TID and Norvasc 10mg (increased 5/31)   - Monitor blood pressures with Hydralazine 10mg IVP Q8H PRN   - IF remains hypertensive then add Isordil     RESPIRATORY  # Respiratory failure   - AMS noted with baclofen toxicity requiring intubation   - Attempted extubation in MICU however course complicated by aspiration and prolonged course and now s/p tracheostomy with IP on 5/14  - Continue on vent 12/500/5/30 and weaned to TC ATC   - Secretions thick and will hold HTS as with strong baseline cough and atrovent to prevent further thickening of secretions   - Continue on Albuterol with TC ATC (5/30 - )   - Able to tolerate PMV during the day with good phonation     GI  # Dysphagia   - s/p surgical PEG 5/17   - Failed green dye on 6/1 and continue on tube feeds via PEG   - Continue with PMV during the day this week and reattempt SS sometime next week.     # GIB  - Noted with several episodes of black stool with drop in Hgb (5/26 overnight) requiring PRBC with appropriate response  - Case discussed with GI, questionable anemia from AOCD with ESRD and black stool likely from iron, and no plan for scope at this time  - Continue on PPI and monitor stools    RENAL  # ESRD on HD MWF with R BCF  # Fistula stenosis   - VA AVF (5/2) with Right radiocephalic arteriovenous fistula has no hemodynamically significant stenosis present at the anastomotic site ( cm/sec, EDV 49 cm/sec). The usable segment is partially thrombosed with minimal patency.  - Required R FEMORAL line on medicine, then removed on 5/2, and replaced with R IJ SHILEY on 5/3 for CRRT then converted back to iHD MWF   - R IJ SHILEY removed on 5/10 second to bacteremia and replaced on 5/13  - R IJ SHILEY removed 5/27 given intermittent fevers    - Blood cultures negative and plan to replaced SHILEY on 6/1   - Vein mapping with no adequate veins in UE for conduit (all <2 mm and/or thrombosed) and pending possible revision of RUE AVF.   - Plan for fistulogram and fistula repair TOMORROW   - Continue on HD MWF per Renal   - Continue on HCO3 650 tabs     # Hyperphosphatemia   - Continue on Renvela 1600 however phos corrects well with HD   - Monitor off Renvela     INFECTIOUS DISEASE  # Aspiration concern   - Recurrent fever spike and CXR with RLL opacity   - BCx (5/25 and 5/30) negative   - SCx (5/25) negative   - SCx (5/30) with citrobacter and completed Zosyn empirically (5/18 - 5/28)    # B Cereus Bacteremia   - Course complicated by fevers and aspiration event   - MRSA (5/1) negative   - BCx (5/2) negative   - SCx (5/4) and BAL (5/12) negative   - BCx (5/10) with bacillus cereus and cleared on (5/12).   - Case discussed with ID and s/p Vancomycin through 5/21 x 10 day course.     HEME  # AOCD with ESRD   - Anemia panel with AOCD and low % sat   - EPO 10K continued on TIW   - Ferrous supplement added   - Transfused on 5/16 and 5/26 and will monitor HH     VASCULAR   # RLE DVT   - CT AP (5/12) with nonocclusive RIGHT common iliac vein deep venous thrombosis and case discussed with IR with no plans for IVC filter.   - Initially started on a Heparin GTT with course complicated by questionable GIB given dark tarry stools, however more likely second to iron tabs.   - Continue on heparin GTT and monitor     ENDOCRINE  # IDDM2 A1C 6.9  - c/w NPH 4U Q6 and ISS  - Monitor and adjust insulin based on FS     SKIN  - R FEMORAL (5/1-5/2)   - R IJ SHILEY (5/3-5/10)   - R IJ SHILEY (5/13 - 5/27)   - R IJ SHILEY (6/1 - )     ETHICS/ GOC    - FULL CODE     DISPO - PMR recc ACUTE   74 YO M with PMHx of ESRD on HD MWF, HTN, and 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 Baclofen started and course complicated by AMS second to baclofen toxicity requiring intubation and MICU transfer. Course further complicated by LEFT pontine and cerebellar stroke, R AVF stenosis, aspiration and B Cereus bacteremia and RLE DVT. s/p trach and transferred to RCU 5/16 and course complicated by intermittent fever spikes with likely aspiration citrobacter PNA, AOCD, and GIB vs Fe stools.     NEUROLOGY  # AMS   - MRI HEAD (5/6) with punctate foci in the left talya and left cerebellum with concern for acute infarct.   - MRA HEAD and NECK (5/6) with no large vessel occlusion or stenosis.  - Continue on aspirin and hold DAPT for now pending procedures   - Continue on Lipitor for medical management     PSYCH   # Depression   - Concern for depression with questionable SI   - CO started, however formal discussion and evaluation with no true SI   - Supportive care and encouragement QD    CARDIOVASCULAR  # CP with NSTEMI < demand ischemia with HTN   - Admitted for CP and HTN with peaked T WAVES and ECG with ST deviation on admission   - Troponins mildly elevated on admission with negative CKMB   - TTE (4/30) with EF 72, normal LVRVSF, and no regional wall motion abnormalities   - Case discussed with cardiology and no acute need for cath.     # Septic vs vasoplegic shock  # Hx of HTN   - Home BP medications held and pressors weaned off   - Continue on coreg 25 (increased 6/1), hydralazine 100 TID and Norvasc 10mg (increased 5/31)   - Monitor blood pressures with Hydralazine 10mg IVP Q8H PRN   - IF remains hypertensive then add Isordil     RESPIRATORY  # Respiratory failure   - AMS noted with baclofen toxicity requiring intubation   - Attempted extubation in MICU however course complicated by aspiration and prolonged course and now s/p tracheostomy with IP on 5/14  - Continue on vent 12/500/5/30 and weaned to TC ATC   - Secretions thick and will hold HTS as with strong baseline cough to prevent further thickening of secretions   - Continue on Albuterol with TC ATC (5/30 - )   - Able to tolerate PMV during the day with good phonation     GI  # Dysphagia   - s/p surgical PEG 5/17   - Failed green dye on 6/1 and continue on tube feeds via PEG   - Continue with PMV during the day this week and reattempt SS sometime next week.     # GIB  - Noted with several episodes of black stool with drop in Hgb (5/26 overnight) requiring PRBC with appropriate response  - Case discussed with GI, questionable anemia from AOCD with ESRD and black stool likely from iron, and no plan for scope at this time  - Continue on PPI and monitor stools    RENAL  # ESRD on HD MWF with R BCF  # Fistula stenosis   - VA AVF (5/2) with Right radiocephalic arteriovenous fistula has no hemodynamically significant stenosis present at the anastomotic site ( cm/sec, EDV 49 cm/sec). The usable segment is partially thrombosed with minimal patency.  - Required R FEMORAL line on medicine, then removed on 5/2, and replaced with R IJ SHILEY on 5/3 for CRRT then converted back to iHD MWF   - R IJ SHILEY removed on 5/10 second to bacteremia and replaced on 5/13  - R IJ SHILEY removed 5/27 given intermittent fevers    - Blood cultures negative and plan to replaced SHILEY on 6/1   - Vein mapping with no adequate veins in UE for conduit (all <2 mm and/or thrombosed) and pending possible revision of RUE AVF.   - Plan for fistulogram and fistula repair TOMORROW   - Continue on HD MWF per Renal   - Continue on HCO3 650 tabs     # Hyperphosphatemia   - Continue on Renvela 1600 however phos corrects well with HD   - Monitor off Renvela     INFECTIOUS DISEASE  # Aspiration concern   - Recurrent fever spike and CXR with RLL opacity   - BCx (5/25 and 5/30) negative   - SCx (5/25) negative   - SCx (5/30) with citrobacter and completed Zosyn empirically (5/18 - 5/28)    # B Cereus Bacteremia   - Course complicated by fevers and aspiration event   - MRSA (5/1) negative   - BCx (5/2) negative   - SCx (5/4) and BAL (5/12) negative   - BCx (5/10) with bacillus cereus and cleared on (5/12).   - Case discussed with ID and s/p Vancomycin through 5/21 x 10 day course.     HEME  # AOCD with ESRD   - Anemia panel with AOCD and low % sat   - EPO 10K continued on TIW   - Ferrous supplement added   - Transfused on 5/16 and 5/26 and will monitor HH     VASCULAR   # RLE DVT   - CT AP (5/12) with nonocclusive RIGHT common iliac vein deep venous thrombosis and case discussed with IR with no plans for IVC filter.   - Initially started on a Heparin GTT with course complicated by questionable GIB given dark tarry stools, however more likely second to iron tabs.   - Continue on heparin GTT and monitor     ENDOCRINE  # IDDM2 A1C 6.9  - c/w NPH 4U Q6 and ISS  - Monitor and adjust insulin based on FS     SKIN  - R FEMORAL (5/1-5/2)   - R IJ SHILEY (5/3-5/10)   - R IJ SHILEY (5/13 - 5/27)   - R IJ SHILEY (6/1 - )     ETHICS/ GOC    - FULL CODE     DISPO - PMR recc ACUTE   74 YO M with PMHx of ESRD on HD MWF, HTN, and 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 Baclofen started and course complicated by AMS second to baclofen toxicity requiring intubation and MICU transfer. Course further complicated by LEFT pontine and cerebellar stroke, R AVF stenosis, aspiration and B Cereus bacteremia and RLE DVT. s/p trach and transferred to RCU 5/16 and course complicated by intermittent fever spikes with likely aspiration citrobacter PNA, AOCD, and GIB vs Fe stools.     NEUROLOGY  # AMS   - MRI HEAD (5/6) with punctate foci in the left talya and left cerebellum with concern for acute infarct.   - MRA HEAD and NECK (5/6) with no large vessel occlusion or stenosis.  - Hold DAPT for now pending procedures   - Continue on Lipitor for medical management     PSYCH   # Depression   - Concern for depression with questionable SI   - CO started, however formal discussion and evaluation with no true SI   - Supportive care and encouragement QD    CARDIOVASCULAR  # CP with NSTEMI < demand ischemia with HTN   - Admitted for CP and HTN with peaked T WAVES and ECG with ST deviation on admission   - Troponins mildly elevated on admission with negative CKMB   - TTE (4/30) with EF 72, normal LVRVSF, and no regional wall motion abnormalities   - Case discussed with cardiology and no acute need for cath.     # Septic vs vasoplegic shock  # Hx of HTN   - Home BP medications held and pressors weaned off   - Continue on coreg 25 (increased 6/1), hydralazine 100 TID and Norvasc 10mg (increased 5/31)   - Monitor blood pressures with Hydralazine 10mg IVP Q8H PRN   - IF remains hypertensive then add Isordil     RESPIRATORY  # Respiratory failure   - AMS noted with baclofen toxicity requiring intubation   - Attempted extubation in MICU however course complicated by aspiration and prolonged course and now s/p tracheostomy with IP on 5/14  - Continue on vent 12/500/5/30 and weaned to TC ATC   - Continue on Albuterol with TC ATC (5/30 - )   - Able to tolerate PMV during the day with good phonation     GI  # Dysphagia   - s/p surgical PEG 5/17   - Failed green dye on 6/1 and continue on tube feeds via PEG   - Continue with PMV during the day this week and reattempt SS sometime next week.     # GIB  - Noted with several episodes of black stool with drop in Hgb (5/26 overnight) requiring PRBC with appropriate response  - Case discussed with GI, questionable anemia from AOCD with ESRD and black stool likely from iron, and no plan for scope at this time  - Continue on PPI and monitor stools    RENAL  # ESRD on HD MWF with R BCF  # Fistula stenosis   - VA AVF (5/2) with Right radiocephalic arteriovenous fistula has no hemodynamically significant stenosis present at the anastomotic site ( cm/sec, EDV 49 cm/sec). The usable segment is partially thrombosed with minimal patency.  - Required R FEMORAL line on medicine, then removed on 5/2, and replaced with R IJ SHILEY on 5/3 for CRRT then converted back to iHD MWF   - R IJ SHILEY removed on 5/10 second to bacteremia and replaced on 5/13  - R IJ SHILEY removed 5/27 given intermittent fevers    - Blood cultures negative and replaced SHILEY on 6/1   - Vein mapping with no adequate veins in UE for conduit (all <2 mm and/or thrombosed) and pending possible revision of RUE AVF.   - Plan for fistulogram and fistula repair TODAY  - Continue on HD MWF per Renal   - Pending permacath placement when medically cleared     # Hyperphosphatemia   - Continue on Renvela 1600 however phos corrects well with HD   - Monitor off Renvela     INFECTIOUS DISEASE  # Aspiration concern   - Recurrent fever spike and CXR with RLL opacity   - BCx (5/25 and 5/30) negative   - SCx (5/25) negative   - SCx (5/30) with citrobacter and completed Zosyn empirically (5/18 - 5/28)    # B Cereus Bacteremia   - Course complicated by fevers and aspiration event   - MRSA (5/1) negative   - BCx (5/2) negative   - SCx (5/4) and BAL (5/12) negative   - BCx (5/10) with bacillus cereus and cleared on (5/12).   - Case discussed with ID and s/p Vancomycin through 5/21 x 10 day course.   - Continue to monitor off antibiotics per ID     HEME  # AOCD with ESRD   - Anemia panel with AOCD and low % sat   - EPO 10K continued on TIW   - Ferrous supplement  - Transfused on 5/16 and 5/26 and will monitor HH     VASCULAR   # RLE DVT   - CT AP (5/12) with nonocclusive RIGHT common iliac vein deep venous thrombosis and case discussed with IR with no plans for IVC filter.   - Initially started on a Heparin GTT with course complicated by questionable GIB given dark tarry stools, however more likely second to iron tabs.   - Continue on heparin GTT and monitor, subtheraputic this morning, follow up PTT    ENDOCRINE  # IDDM2 A1C 6.9  - c/w NPH 4U Q6 and ISS  - Monitor and adjust insulin based on FS     SKIN  - R FEMORAL (5/1-5/2)   - R IJ SHILEY (5/3-5/10)   - R IJ SHILEY (5/13 - 5/27)   - R IJ SHILEY (6/1 - )     ETHICS/ GOC    - FULL CODE     DISPO - PMR recc ACUTE

## 2024-06-04 NOTE — PROGRESS NOTE ADULT - NS ATTEND AMEND GEN_ALL_CORE FT
agree with above  doing well on TC  tolerated PMV as well  for vascular today  d/c plan is acute rehab  wife at bedside

## 2024-06-04 NOTE — PROGRESS NOTE ADULT - ASSESSMENT
73-year-old male past medical history hypertension, ESRD on dialysis Monday Wednesday Friday, diabetes insulin-dependent presents with hiccups patient endorses persistent hiccups for the last 2 days.   found to have peaked T waves, a new finding. denied dizziness, N/V, denies CP, palps, abd pain  Upon admission seen by CArd, renal and GI  found to have a distended GB prob 2/2 gastroparesis, was started on Reglan  has received 4 doses of baclofen since 4/30 2/2 hiccups, last dose on 5/1 at 5 am  had received Haldol for agitation at 11 pm on 4/30, AMS observed in pm of 5/1  AMS ongoing, RRT x 2 called  now transferred to MICU for encephalopathy requiring  airway protection on 5/2,  intubated for airway protection, was on  propofol and pressors. now off  toxicology consulted upon dx of encephalopathy, not impressed AMS 2/2 Haldol nor baclofen . AMS was 2/2 acute CVA   HD was not done timely until femoral shiley was placed 2/2 clotted RUE AVF. now removed and RIJ shiley placed on 5/3  has been nonverbal since pm 5/1.     MR brain 5/6 c/w L pontine and L cerebellar acute infarcts, no hemorrhage . as per neuro: embolic in nature.   encephalopathy probably 2/2 acute CVA, now follows commands appropriately off sedation.   no SZ focus on EEG,   off CRRT,  HD resumed as per renal.   remains intubated, now trached  off keppra  AC resumed, was initially on  Heparin drip for R common iliac DVT, now held 2/2 GI bleed  completed 5 days of empiric ZOSYN on 5/7, shortly after became septic again after an extubation trial. bacteremia w B. cereus, on Vanc through 5/20 as per ID    s/p trache placement by pulm,  PEG placed by surgery 5/17, tolerating feeds  HD via R IJ.  Tmax overnight( 5/18)  101, cxr c/w RLL PNA, now on Zosyn, blood Cx s sent. remains on Vanco for B. cereus bacteremia   leukocytosis resolved  EKG changes on 5/3 c/w NSTEMI loaded w DAPT , was  on Heparin drip x 48 hrs. rpt TTE is unchanged  ID, Neuro , renal, cardiology following.  clotted RUE AVF.  fistulogram was cancelled on 5/25 2/2 fever  HD via Krush, replaced 6/1  LP done, no sign of meningitis.   NEUROLOGY     - CTH without acute findings   - LP done, no acute findings  - MR brain w acute infarcts: L talya and L cerebellum, nonhemorrhagic  - no Sz focus on EEG    CARDIOVASCULAR   - ptn never had CP. just peaked T waves. was awaiting ischemic study w nucl stress test  - TTE showing EF 72%, rpt unchanged  - EKG changes on 5/3, loaded w DAPT  - BP improved.  on Norvasc, Coreg and hydralazine, max doses. as per renal add Imdur to improve BP    GI  - PEG placed, npo w tube feeds  - Gastroparesis       RENAL   -  HD as per renal  - josephineley replaced 6/1  - s/p balloon angioplasty of RUE AVF stenosis on 6/4      INFECTIOUS DISEASE     completed a course of Zosyn, then became septic, bacteremic a B. cereus, completed 3 days of Meropenem, completed vanc through 5/21  on Zosyn since 5/18 for RLL PNA, afebrile, completed 5/23  recurrent fever 5/25, ZOsyn resumed and stopped on 5/28. afebrile since    ENDOCRINE   - DM  - cw ISS    DVT  - RLE, on Heparin drip,     GOC:  Full code  Dispo: acute rehab

## 2024-06-04 NOTE — PROGRESS NOTE ADULT - SUBJECTIVE AND OBJECTIVE BOX
VASCULAR SURGERY DAILY PROGRESS NOTE:     SUBJECTIVE/ROS:   Patient seen and evaluated on AM rounds.       OBJECTIVE:  Vital Signs Last 24 Hrs  T(C): 36.7 (2024 22:00), Max: 37.1 (2024 16:15)  T(F): 98.1 (2024 22:00), Max: 98.7 (2024 16:15)  HR: 73 (2024 03:57) (58 - 73)  BP: 123/46 (2024 22:00) (123/46 - 172/57)  BP(mean): 68 (2024 22:00) (68 - 87)  RR: 18 (2024 22:00) (17 - 20)  SpO2: 99% (2024 03:57) (97% - 100%)    Parameters below as of 2024 22:00  Patient On (Oxygen Delivery Method): tracheostomy collar  O2 Flow (L/min): 7  O2 Concentration (%): 28  I&O's Detail    2024 07:01  -  2024 07:00  --------------------------------------------------------  IN:    Enteral Tube Flush: 560 mL    Nepro: 1080 mL  Total IN: 1640 mL    OUT:  Total OUT: 0 mL    Total NET: 1640 mL      2024 07:01  -  2024 05:21  --------------------------------------------------------  IN:    Other (mL): 400 mL  Total IN: 400 mL    OUT:    Other (mL): 2200 mL  Total OUT: 2200 mL    Total NET: -1800 mL        Daily     Daily Weight in k.2 (2024 19:15)  MEDICATIONS  (STANDING):  albuterol    0.083% 2.5 milliGRAM(s) Nebulizer every 6 hours  amLODIPine   Tablet 10 milliGRAM(s) Oral daily  atorvastatin 20 milliGRAM(s) Oral at bedtime  carvedilol 25 milliGRAM(s) Oral every 12 hours  chlorhexidine 0.12% Liquid 15 milliLiter(s) Oral Mucosa every 12 hours  chlorhexidine 2% Cloths 1 Application(s) Topical <User Schedule>  dextrose 10% Bolus 125 milliLiter(s) IV Bolus once  dextrose 5%. 1000 milliLiter(s) (100 mL/Hr) IV Continuous <Continuous>  dextrose 5%. 1000 milliLiter(s) (50 mL/Hr) IV Continuous <Continuous>  dextrose 50% Injectable 25 Gram(s) IV Push once  dextrose 50% Injectable 12.5 Gram(s) IV Push once  epoetin reilly (EPOGEN) Injectable 46978 Unit(s) IV Push <User Schedule>  ferrous    sulfate Liquid 300 milliGRAM(s) Enteral Tube daily  glucagon  Injectable 1 milliGRAM(s) IntraMuscular once  heparin  Infusion. 700 Unit(s)/Hr (7 mL/Hr) IV Continuous <Continuous>  hydrALAZINE 100 milliGRAM(s) Oral three times a day  insulin lispro (ADMELOG) corrective regimen sliding scale   SubCutaneous every 6 hours  insulin NPH human recombinant 4 Unit(s) SubCutaneous every 6 hours  pantoprazole  Injectable 40 milliGRAM(s) IV Push every 12 hours    MEDICATIONS  (PRN):  acetaminophen   Oral Liquid .. 650 milliGRAM(s) Oral every 6 hours PRN Temp greater or equal to 38C (100.4F), Moderate Pain (4 - 6)  dextrose Oral Gel 15 Gram(s) Oral once PRN Blood Glucose LESS THAN 70 milliGRAM(s)/deciliter  hydrALAZINE Injectable 10 milliGRAM(s) IV Push every 8 hours PRN SBP > 180  melatonin 3 milliGRAM(s) Oral at bedtime PRN Insomnia  sodium chloride 0.9% lock flush 10 milliLiter(s) IV Push every 1 hour PRN Pre/post blood products, medications, blood draw, and to maintain line patency      Labs:                        7.2    10.60 )-----------( 465      ( 2024 02:21 )             22.3     06-04    135  |  96<L>  |  41<H>  ----------------------------<  155<H>  4.0   |  26  |  4.13<H>    Ca    8.7      2024 02:21  Phos  2.1     06-04  Mg     2.20     06-04      PT/INR - ( 2024 02:21 )   PT: 11.9 sec;   INR: 1.06 ratio         PTT - ( 2024 03:35 )  PTT:29.6 sec  Urinalysis Basic - ( 2024 02:21 )    Color: x / Appearance: x / SG: x / pH: x  Gluc: 155 mg/dL / Ketone: x  / Bili: x / Urobili: x   Blood: x / Protein: x / Nitrite: x   Leuk Esterase: x / RBC: x / WBC x   Sq Epi: x / Non Sq Epi: x / Bacteria: x        Physical Exam:  General: NAD, resting comfortably in bed  HEENT: Normocephalic atraumatic  Respiratory: Nonlabored respirations

## 2024-06-04 NOTE — PROGRESS NOTE ADULT - SUBJECTIVE AND OBJECTIVE BOX
Patient is a 73y old  Male who presents with a chief complaint of Unstable angina, abn EKG (04 Jun 2024 11:51)      SUBJECTIVE / OVERNIGHT EVENTS: s/p balloon angioplasty of RUE AVF stenosis.     MEDICATIONS  (STANDING):  albuterol    0.083% 2.5 milliGRAM(s) Nebulizer every 6 hours  amLODIPine   Tablet 10 milliGRAM(s) Oral daily  atorvastatin 20 milliGRAM(s) Oral at bedtime  carvedilol 25 milliGRAM(s) Oral every 12 hours  chlorhexidine 0.12% Liquid 15 milliLiter(s) Oral Mucosa every 12 hours  chlorhexidine 2% Cloths 1 Application(s) Topical <User Schedule>  dextrose 10% Bolus 125 milliLiter(s) IV Bolus once  dextrose 5%. 1000 milliLiter(s) (100 mL/Hr) IV Continuous <Continuous>  dextrose 5%. 1000 milliLiter(s) (50 mL/Hr) IV Continuous <Continuous>  dextrose 50% Injectable 25 Gram(s) IV Push once  dextrose 50% Injectable 12.5 Gram(s) IV Push once  epoetin reilly (EPOGEN) Injectable 67432 Unit(s) IV Push <User Schedule>  ferrous    sulfate Liquid 300 milliGRAM(s) Enteral Tube daily  glucagon  Injectable 1 milliGRAM(s) IntraMuscular once  heparin  Infusion. 700 Unit(s)/Hr (7 mL/Hr) IV Continuous <Continuous>  hydrALAZINE 100 milliGRAM(s) Oral three times a day  insulin lispro (ADMELOG) corrective regimen sliding scale   SubCutaneous every 6 hours  insulin NPH human recombinant 4 Unit(s) SubCutaneous every 6 hours  pantoprazole  Injectable 40 milliGRAM(s) IV Push every 12 hours  sodium phosphate 15 milliMole(s)/250 mL IVPB 15 milliMole(s) IV Intermittent once    MEDICATIONS  (PRN):  acetaminophen   Oral Liquid .. 650 milliGRAM(s) Oral every 6 hours PRN Temp greater or equal to 38C (100.4F), Moderate Pain (4 - 6)  dextrose Oral Gel 15 Gram(s) Oral once PRN Blood Glucose LESS THAN 70 milliGRAM(s)/deciliter  hydrALAZINE Injectable 10 milliGRAM(s) IV Push every 8 hours PRN SBP > 180  melatonin 3 milliGRAM(s) Oral at bedtime PRN Insomnia  sodium chloride 0.9% lock flush 10 milliLiter(s) IV Push every 1 hour PRN Pre/post blood products, medications, blood draw, and to maintain line patency      Vital Signs Last 24 Hrs  T(F): 98.7 (06-04-24 @ 20:05), Max: 98.7 (06-04-24 @ 20:05)  HR: 55 (06-04-24 @ 21:00) (55 - 80)  BP: 151/64 (06-04-24 @ 21:00) (123/46 - 172/58)  RR: 20 (06-04-24 @ 21:00) (12 - 20)  SpO2: 98% (06-04-24 @ 21:00) (97% - 100%)  Telemetry:   CAPILLARY BLOOD GLUCOSE      POCT Blood Glucose.: 143 mg/dL (04 Jun 2024 21:26)  POCT Blood Glucose.: 143 mg/dL (04 Jun 2024 20:22)  POCT Blood Glucose.: 149 mg/dL (04 Jun 2024 16:12)  POCT Blood Glucose.: 136 mg/dL (04 Jun 2024 11:23)  POCT Blood Glucose.: 116 mg/dL (04 Jun 2024 05:13)  POCT Blood Glucose.: 221 mg/dL (04 Jun 2024 00:18)    I&O's Summary    03 Jun 2024 07:01  -  04 Jun 2024 07:00  --------------------------------------------------------  IN: 825 mL / OUT: 2200 mL / NET: -1375 mL        PHYSICAL EXAM:  GENERAL: NAD, well-developed  HEAD:  Atraumatic, Normocephalic  EYES: EOMI, PERRLA, conjunctiva and sclera clear  NECK: Supple, No JVD  CHEST/LUNG: Clear to auscultation bilaterally; No wheeze  HEART: Regular rate and rhythm; No murmurs, rubs, or gallops  ABDOMEN: Soft, Nontender, Nondistended; Bowel sounds present  EXTREMITIES:  2+ Peripheral Pulses, No clubbing, cyanosis, or edema  PSYCH: AAOx3  NEUROLOGY: non-focal  SKIN: No rashes or lesions    LABS:                        7.2    10.60 )-----------( 465      ( 04 Jun 2024 02:21 )             22.3     06-04    135  |  96<L>  |  41<H>  ----------------------------<  155<H>  4.0   |  26  |  4.13<H>    Ca    8.7      04 Jun 2024 02:21  Phos  2.1     06-04  Mg     2.20     06-04      PT/INR - ( 04 Jun 2024 02:21 )   PT: 11.9 sec;   INR: 1.06 ratio         PTT - ( 04 Jun 2024 03:35 )  PTT:29.6 sec      Urinalysis Basic - ( 04 Jun 2024 02:21 )    Color: x / Appearance: x / SG: x / pH: x  Gluc: 155 mg/dL / Ketone: x  / Bili: x / Urobili: x   Blood: x / Protein: x / Nitrite: x   Leuk Esterase: x / RBC: x / WBC x   Sq Epi: x / Non Sq Epi: x / Bacteria: x        RADIOLOGY & ADDITIONAL TESTS:    Imaging Personally Reviewed:    Consultant(s) Notes Reviewed:      Care Discussed with Consultants/Other Providers:

## 2024-06-04 NOTE — PROGRESS NOTE ADULT - ASSESSMENT
73-year-old male past medical history hypertension, ESRD on dialysis Monday Wednesday Friday, diabetes insulin-dependent presents with hiccups patient endorses persistent hiccups for the last 2 days. Nephrology consulted for HD needs.    A/P  ESRD:  Center: [place]  Nephrologist: Dr. Hardwick  Access: R AVF now nonfunctioning vascular on board, s/p right shiley by IR. pending permacath placement when medically cleared   pending vascular plan for thrombectomy   MWF schedule, last hd 6/3 uf 1.8L, hd tmr  Consent obtained and placed in ED chart  Renal diet  Monitor BMP  Consider [facility] for rehab where his outpatient Nephrologist, Dr. Hardwick can follow him    Hyperkalemia/Hypokalemia  Improved w/ HD.  C/W HD schedule as above.  Lokelma 10gm PRN for K >5.3 on non-HD days  K stable.  Low K diet.  Monitor closely.    HTN:  BP fluctuating but acceptable   On carvedilol 12.5mg BID, hydralazine 100mg TID.  on norvasc   UF w/ HD as BP permits.  Monitor BP.    Anemia:  Hgb low.  + iron deficiency.  Tsat 13% - on ferrous sulfate 300mg qd via PEG.  Venofer held d/t leukocytosis.  SILVA w/ HD.  Transfuse for Hgb <7.  Monitor Hb.    CKD-MBD   - low for CKD.  PO4 low supplemented today.  Sevelamer 1600mg TID d/c'ed 5/21.  Monitor Ca, PO4 daily    Hypocalcemia:  In setting of CKD vs. hypoalbuminemia.  Optimize albumin.  Corrected Ca WNL.  Replete as needed.  Monitor Ca.    hyponatremia  better  monitor

## 2024-06-05 LAB
ANION GAP SERPL CALC-SCNC: 14 MMOL/L — SIGNIFICANT CHANGE UP (ref 7–14)
ANION GAP SERPL CALC-SCNC: 14 MMOL/L — SIGNIFICANT CHANGE UP (ref 7–14)
APTT BLD: 34 SEC — SIGNIFICANT CHANGE UP (ref 24.5–35.6)
APTT BLD: 78.4 SEC — HIGH (ref 24.5–35.6)
BUN SERPL-MCNC: 44 MG/DL — HIGH (ref 7–23)
BUN SERPL-MCNC: 49 MG/DL — HIGH (ref 7–23)
CALCIUM SERPL-MCNC: 8.4 MG/DL — SIGNIFICANT CHANGE UP (ref 8.4–10.5)
CALCIUM SERPL-MCNC: 8.6 MG/DL — SIGNIFICANT CHANGE UP (ref 8.4–10.5)
CHLORIDE SERPL-SCNC: 94 MMOL/L — LOW (ref 98–107)
CHLORIDE SERPL-SCNC: 94 MMOL/L — LOW (ref 98–107)
CO2 SERPL-SCNC: 25 MMOL/L — SIGNIFICANT CHANGE UP (ref 22–31)
CO2 SERPL-SCNC: 25 MMOL/L — SIGNIFICANT CHANGE UP (ref 22–31)
CREAT SERPL-MCNC: 4.69 MG/DL — HIGH (ref 0.5–1.3)
CREAT SERPL-MCNC: 5.15 MG/DL — HIGH (ref 0.5–1.3)
EGFR: 11 ML/MIN/1.73M2 — LOW
EGFR: 13 ML/MIN/1.73M2 — LOW
GLUCOSE BLDC GLUCOMTR-MCNC: 118 MG/DL — HIGH (ref 70–99)
GLUCOSE BLDC GLUCOMTR-MCNC: 142 MG/DL — HIGH (ref 70–99)
GLUCOSE BLDC GLUCOMTR-MCNC: 156 MG/DL — HIGH (ref 70–99)
GLUCOSE BLDC GLUCOMTR-MCNC: 179 MG/DL — HIGH (ref 70–99)
GLUCOSE SERPL-MCNC: 131 MG/DL — HIGH (ref 70–99)
GLUCOSE SERPL-MCNC: 150 MG/DL — HIGH (ref 70–99)
HCT VFR BLD CALC: 20.1 % — CRITICAL LOW (ref 39–50)
HCT VFR BLD CALC: 22.2 % — LOW (ref 39–50)
HCT VFR BLD CALC: 23.1 % — LOW (ref 39–50)
HGB BLD-MCNC: 6.6 G/DL — CRITICAL LOW (ref 13–17)
HGB BLD-MCNC: 7 G/DL — CRITICAL LOW (ref 13–17)
HGB BLD-MCNC: 7.3 G/DL — LOW (ref 13–17)
MAGNESIUM SERPL-MCNC: 2.3 MG/DL — SIGNIFICANT CHANGE UP (ref 1.6–2.6)
MAGNESIUM SERPL-MCNC: 2.4 MG/DL — SIGNIFICANT CHANGE UP (ref 1.6–2.6)
MCHC RBC-ENTMCNC: 21.5 PG — LOW (ref 27–34)
MCHC RBC-ENTMCNC: 21.8 PG — LOW (ref 27–34)
MCHC RBC-ENTMCNC: 22.2 PG — LOW (ref 27–34)
MCHC RBC-ENTMCNC: 31.5 GM/DL — LOW (ref 32–36)
MCHC RBC-ENTMCNC: 31.6 GM/DL — LOW (ref 32–36)
MCHC RBC-ENTMCNC: 32.8 GM/DL — SIGNIFICANT CHANGE UP (ref 32–36)
MCV RBC AUTO: 67.7 FL — LOW (ref 80–100)
MCV RBC AUTO: 68.1 FL — LOW (ref 80–100)
MCV RBC AUTO: 69 FL — LOW (ref 80–100)
NRBC # BLD: 0 /100 WBCS — SIGNIFICANT CHANGE UP (ref 0–0)
NRBC # FLD: 0.03 K/UL — HIGH (ref 0–0)
NRBC # FLD: 0.04 K/UL — HIGH (ref 0–0)
NRBC # FLD: 0.05 K/UL — HIGH (ref 0–0)
PHOSPHATE SERPL-MCNC: 4.3 MG/DL — SIGNIFICANT CHANGE UP (ref 2.5–4.5)
PHOSPHATE SERPL-MCNC: 4.8 MG/DL — HIGH (ref 2.5–4.5)
PLATELET # BLD AUTO: 379 K/UL — SIGNIFICANT CHANGE UP (ref 150–400)
PLATELET # BLD AUTO: 414 K/UL — HIGH (ref 150–400)
PLATELET # BLD AUTO: 423 K/UL — HIGH (ref 150–400)
POTASSIUM SERPL-MCNC: 3.8 MMOL/L — SIGNIFICANT CHANGE UP (ref 3.5–5.3)
POTASSIUM SERPL-MCNC: 4.3 MMOL/L — SIGNIFICANT CHANGE UP (ref 3.5–5.3)
POTASSIUM SERPL-SCNC: 3.8 MMOL/L — SIGNIFICANT CHANGE UP (ref 3.5–5.3)
POTASSIUM SERPL-SCNC: 4.3 MMOL/L — SIGNIFICANT CHANGE UP (ref 3.5–5.3)
RBC # BLD: 2.97 M/UL — LOW (ref 4.2–5.8)
RBC # BLD: 3.26 M/UL — LOW (ref 4.2–5.8)
RBC # BLD: 3.35 M/UL — LOW (ref 4.2–5.8)
RBC # FLD: 23.2 % — HIGH (ref 10.3–14.5)
RBC # FLD: 23.4 % — HIGH (ref 10.3–14.5)
RBC # FLD: 23.7 % — HIGH (ref 10.3–14.5)
SODIUM SERPL-SCNC: 133 MMOL/L — LOW (ref 135–145)
SODIUM SERPL-SCNC: 133 MMOL/L — LOW (ref 135–145)
WBC # BLD: 10.02 K/UL — SIGNIFICANT CHANGE UP (ref 3.8–10.5)
WBC # BLD: 8.12 K/UL — SIGNIFICANT CHANGE UP (ref 3.8–10.5)
WBC # BLD: 8.37 K/UL — SIGNIFICANT CHANGE UP (ref 3.8–10.5)
WBC # FLD AUTO: 10.02 K/UL — SIGNIFICANT CHANGE UP (ref 3.8–10.5)
WBC # FLD AUTO: 8.12 K/UL — SIGNIFICANT CHANGE UP (ref 3.8–10.5)
WBC # FLD AUTO: 8.37 K/UL — SIGNIFICANT CHANGE UP (ref 3.8–10.5)

## 2024-06-05 PROCEDURE — 99232 SBSQ HOSP IP/OBS MODERATE 35: CPT

## 2024-06-05 PROCEDURE — 99233 SBSQ HOSP IP/OBS HIGH 50: CPT

## 2024-06-05 RX ORDER — HEPARIN SODIUM 5000 [USP'U]/ML
900 INJECTION INTRAVENOUS; SUBCUTANEOUS
Qty: 25000 | Refills: 0 | Status: DISCONTINUED | OUTPATIENT
Start: 2024-06-05 | End: 2024-06-06

## 2024-06-05 RX ADMIN — CHLORHEXIDINE GLUCONATE 1 APPLICATION(S): 213 SOLUTION TOPICAL at 05:08

## 2024-06-05 RX ADMIN — Medication 2: at 23:34

## 2024-06-05 RX ADMIN — HUMAN INSULIN 4 UNIT(S): 100 INJECTION, SUSPENSION SUBCUTANEOUS at 23:34

## 2024-06-05 RX ADMIN — ALBUTEROL 2.5 MILLIGRAM(S): 90 AEROSOL, METERED ORAL at 12:09

## 2024-06-05 RX ADMIN — AMLODIPINE BESYLATE 10 MILLIGRAM(S): 2.5 TABLET ORAL at 05:08

## 2024-06-05 RX ADMIN — PANTOPRAZOLE SODIUM 40 MILLIGRAM(S): 20 TABLET, DELAYED RELEASE ORAL at 17:16

## 2024-06-05 RX ADMIN — HUMAN INSULIN 4 UNIT(S): 100 INJECTION, SUSPENSION SUBCUTANEOUS at 05:30

## 2024-06-05 RX ADMIN — ALBUTEROL 2.5 MILLIGRAM(S): 90 AEROSOL, METERED ORAL at 03:57

## 2024-06-05 RX ADMIN — HEPARIN SODIUM 900 UNIT(S)/HR: 5000 INJECTION INTRAVENOUS; SUBCUTANEOUS at 02:28

## 2024-06-05 RX ADMIN — ATORVASTATIN CALCIUM 20 MILLIGRAM(S): 80 TABLET, FILM COATED ORAL at 21:07

## 2024-06-05 RX ADMIN — PANTOPRAZOLE SODIUM 40 MILLIGRAM(S): 20 TABLET, DELAYED RELEASE ORAL at 05:07

## 2024-06-05 RX ADMIN — CARVEDILOL PHOSPHATE 25 MILLIGRAM(S): 80 CAPSULE, EXTENDED RELEASE ORAL at 05:08

## 2024-06-05 RX ADMIN — Medication 2: at 05:31

## 2024-06-05 RX ADMIN — ALBUTEROL 2.5 MILLIGRAM(S): 90 AEROSOL, METERED ORAL at 16:35

## 2024-06-05 RX ADMIN — CHLORHEXIDINE GLUCONATE 15 MILLILITER(S): 213 SOLUTION TOPICAL at 17:16

## 2024-06-05 RX ADMIN — Medication 100 MILLIGRAM(S): at 04:15

## 2024-06-05 RX ADMIN — HUMAN INSULIN 4 UNIT(S): 100 INJECTION, SUSPENSION SUBCUTANEOUS at 17:25

## 2024-06-05 RX ADMIN — ERYTHROPOIETIN 10000 UNIT(S): 10000 INJECTION, SOLUTION INTRAVENOUS; SUBCUTANEOUS at 14:30

## 2024-06-05 RX ADMIN — HEPARIN SODIUM 900 UNIT(S)/HR: 5000 INJECTION INTRAVENOUS; SUBCUTANEOUS at 15:35

## 2024-06-05 RX ADMIN — Medication 300 MILLIGRAM(S): at 11:38

## 2024-06-05 RX ADMIN — CARVEDILOL PHOSPHATE 25 MILLIGRAM(S): 80 CAPSULE, EXTENDED RELEASE ORAL at 17:16

## 2024-06-05 RX ADMIN — Medication 100 MILLIGRAM(S): at 21:07

## 2024-06-05 NOTE — PROGRESS NOTE ADULT - ASSESSMENT
72 yo Mw/ PMHx hypertension, ESRD on HD via R radiocephalic fistula (MWF), DM, HTN, p/w hiccups and peaked T waves, admitted to MICU for c/f baclofen toxicity. Patient received 1x HD on admission. R femoral shiley removed 5/2, had 2 RRT for AMS and failed HD via AVF 5/3. S/p emergent R IJ shiley placement 5/3, started CRRT which is now transitioned back to iHD. Vascular planning RUE fistulogram when medically optimized. Extubated to HFNC then reintubated 5/12 for c/f aspiration w/ hypoxic resp failure. S/p trach 5/14. Downgraded from MICU to RCU 5/16.    Recommendations:   - S/p RUE fistulogram, angioplasty of outflow stenosis on 6/4, recovering appropriately   - Please use RUE AVF for HD today  - If able to successfully dialyze today, please remove R IJ shiley   - Global care per primary       Vascular Surgery  [phone]

## 2024-06-05 NOTE — PROGRESS NOTE ADULT - ASSESSMENT
conservative gi magnemnt  no acute gi complaints  ppi thearpy  low dose a/c  ? form peg   egd with surgery no comment pathology

## 2024-06-05 NOTE — PROGRESS NOTE ADULT - SUBJECTIVE AND OBJECTIVE BOX
[facility]   MD ELIANE BHAGAT MD ANGELA WONG, PA    TEL:  OFFICE: [phone]  From 5pm-7am Answering Service [phone]    -- RENAL FOLLOW UP NOTE ---Date of Service 06-05-24 @ 11:47    Patient is a 73y old  Male who presents with a chief complaint of Unstable angina, abn EKG (05 Jun 2024 11:25)      Patient seen and examined at bedside.     VITALS:  T(F): 97.2 (06-05-24 @ 06:00), Max: 98.7 (06-04-24 @ 20:05)  HR: 53 (06-05-24 @ 08:15)  BP: 124/50 (06-05-24 @ 06:00)  RR: 18 (06-05-24 @ 08:15)  SpO2: 100% (06-05-24 @ 08:15)  Wt(kg): --    06-04 @ 07:01  -  06-05 @ 07:00  --------------------------------------------------------  IN: 280 mL / OUT: 0 mL / NET: 280 mL      Height (cm): 172.7 (06-04 @ 17:09)  Weight (kg): 52.6 (06-04 @ 17:09)  BMI (kg/m2): 17.6 (06-04 @ 17:09)  BSA (m2): 1.62 (06-04 @ 17:09)    PHYSICAL EXAM:  General: NAD  Neck: +trach  Respiratory: CTAB, no wheezes, rales or rhonchi  Cardiovascular: S1, S2, RRR  Gastrointestinal: +peg  Extremities: No peripheral edema, right avf + bruit     Hospital Medications:   MEDICATIONS  (STANDING):  albuterol    0.083% 2.5 milliGRAM(s) Nebulizer every 6 hours  amLODIPine   Tablet 10 milliGRAM(s) Oral daily  atorvastatin 20 milliGRAM(s) Oral at bedtime  carvedilol 25 milliGRAM(s) Oral every 12 hours  chlorhexidine 0.12% Liquid 15 milliLiter(s) Oral Mucosa every 12 hours  chlorhexidine 2% Cloths 1 Application(s) Topical <User Schedule>  dextrose 10% Bolus 125 milliLiter(s) IV Bolus once  dextrose 5%. 1000 milliLiter(s) (50 mL/Hr) IV Continuous <Continuous>  dextrose 5%. 1000 milliLiter(s) (100 mL/Hr) IV Continuous <Continuous>  dextrose 50% Injectable 25 Gram(s) IV Push once  dextrose 50% Injectable 12.5 Gram(s) IV Push once  epoetin reilly (EPOGEN) Injectable 75503 Unit(s) IV Push <User Schedule>  ferrous    sulfate Liquid 300 milliGRAM(s) Enteral Tube daily  glucagon  Injectable 1 milliGRAM(s) IntraMuscular once  heparin  Infusion. 900 Unit(s)/Hr (9 mL/Hr) IV Continuous <Continuous>  hydrALAZINE 100 milliGRAM(s) Oral three times a day  insulin lispro (ADMELOG) corrective regimen sliding scale   SubCutaneous every 6 hours  insulin NPH human recombinant 4 Unit(s) SubCutaneous every 6 hours  pantoprazole  Injectable 40 milliGRAM(s) IV Push every 12 hours      LABS:  06-05    133<L>  |  94<L>  |  49<H>  ----------------------------<  150<H>  4.3   |  25  |  5.15<H>    Ca    8.6      05 Jun 2024 01:30  Phos  4.8     06-05  Mg     2.40     06-05      Creatinine Trend: 5.15 <--, 4.13 <--, 6.00 <--, 5.26 <--, 4.19 <--, 6.99 <--, 6.43 <--, 5.82 <--    Phosphorus: 4.8 mg/dL (06-05 @ 01:30)                              7.3    10.02 )-----------( 414      ( 05 Jun 2024 01:30 )             23.1     Urine Studies:  Urinalysis - [06-05-24 @ 01:30]      Color  / Appearance  / SG  / pH       Gluc 150 / Ketone   / Bili  / Urobili        Blood  / Protein  / Leuk Est  / Nitrite       RBC  / WBC  / Hyaline  / Gran  / Sq Epi  / Non Sq Epi  / Bacteria       Iron 16, TIBC 121, %sat 13      [05-17-24 @ 06:00]  Ferritin 720      [05-17-24 @ 06:00]  PTH -- (Ca --)      [05-26-24 @ 01:20]   122  TSH 2.60      [05-17-24 @ 06:00]  Lipid: chol 86, , HDL 27, LDL --      [05-17-24 @ 06:00]    HBsAb <3.0      [06-03-24 @ 16:20]  HBsAg Nonreact      [06-03-24 @ 16:20]  HBcAb Nonreact      [06-03-24 @ 16:20]  HCV 0.09, Nonreact      [06-03-24 @ 16:20]      RADIOLOGY & ADDITIONAL STUDIES:

## 2024-06-05 NOTE — PROGRESS NOTE ADULT - SUBJECTIVE AND OBJECTIVE BOX
Cardiovascular Disease Progress Note  DATE OF SERVICE: 06-05-24 @ 08:00    Overnight events: No acute events overnight.    Mr. Art is in no distress on trach collar.        Objective Findings:  T(C): 36.2 (06-05-24 @ 06:00), Max: 37.1 (06-04-24 @ 20:05)  HR: 53 (06-05-24 @ 06:00) (53 - 80)  BP: 124/50 (06-05-24 @ 06:00) (124/50 - 172/58)  RR: 18 (06-05-24 @ 06:00) (12 - 20)  SpO2: 99% (06-05-24 @ 06:00) (97% - 100%)  Wt(kg): --  Daily Height in cm: 172.7 (04 Jun 2024 16:21)    Daily       Physical Exam:  Gen: NAD; Patient resting comfortably  HEENT: EOMI, Normocephalic/ atraumatic  CV: RRR, normal S1 + S2, no m/r/g  Lungs:  Normal respiratory effort; clear to auscultation bilaterally  Abd: soft, non-tender; bowel sounds present  Ext: No edema; warm and well perfused    Telemetry: n/a    Laboratory Data:                        7.3    10.02 )-----------( 414      ( 05 Jun 2024 01:30 )             23.1     06-05    133<L>  |  94<L>  |  49<H>  ----------------------------<  150<H>  4.3   |  25  |  5.15<H>    Ca    8.6      05 Jun 2024 01:30  Phos  4.8     06-05  Mg     2.40     06-05      PT/INR - ( 04 Jun 2024 02:21 )   PT: 11.9 sec;   INR: 1.06 ratio         PTT - ( 05 Jun 2024 01:30 )  PTT:34.0 sec          Inpatient Medications:  MEDICATIONS  (STANDING):  albuterol    0.083% 2.5 milliGRAM(s) Nebulizer every 6 hours  amLODIPine   Tablet 10 milliGRAM(s) Oral daily  atorvastatin 20 milliGRAM(s) Oral at bedtime  carvedilol 25 milliGRAM(s) Oral every 12 hours  chlorhexidine 0.12% Liquid 15 milliLiter(s) Oral Mucosa every 12 hours  chlorhexidine 2% Cloths 1 Application(s) Topical <User Schedule>  dextrose 10% Bolus 125 milliLiter(s) IV Bolus once  dextrose 5%. 1000 milliLiter(s) (50 mL/Hr) IV Continuous <Continuous>  dextrose 5%. 1000 milliLiter(s) (100 mL/Hr) IV Continuous <Continuous>  dextrose 50% Injectable 12.5 Gram(s) IV Push once  dextrose 50% Injectable 25 Gram(s) IV Push once  epoetin reilly (EPOGEN) Injectable 08272 Unit(s) IV Push <User Schedule>  ferrous    sulfate Liquid 300 milliGRAM(s) Enteral Tube daily  glucagon  Injectable 1 milliGRAM(s) IntraMuscular once  heparin  Infusion. 900 Unit(s)/Hr (9 mL/Hr) IV Continuous <Continuous>  hydrALAZINE 100 milliGRAM(s) Oral three times a day  insulin lispro (ADMELOG) corrective regimen sliding scale   SubCutaneous every 6 hours  insulin NPH human recombinant 4 Unit(s) SubCutaneous every 6 hours  pantoprazole  Injectable 40 milliGRAM(s) IV Push every 12 hours      Assessment: 73 year old man with HTN, HLD, T2DM on insulin, and ESRD on HD presents with supply demand ischemia and angina.    Plan of Care:    #Type II myocardial infarction-  Secondary to distributive shock earlier on admission.   The repeat echo shows no new wall motion abnormality.   I would not pursue cath at this time given debility and chronic respiratory failure s/p trach 5/14.          #HTN-  Readings are much improved after amlodipine and Coreg were up titrated.       #ESRD-  HD as per renal     #DVT   - R common Iliac DVT  AC as per RCU.                Over 55 minutes spent on total encounter; more than 50% of the visit was spent counseling and/or coordinating care by the attending physician.      Geovani Bravo MD [facility]  Cardiovascular Disease  [phone]

## 2024-06-05 NOTE — PROGRESS NOTE ADULT - ASSESSMENT
73-year-old male past medical history hypertension, ESRD on dialysis Monday Wednesday Friday, diabetes insulin-dependent presents with hiccups patient endorses persistent hiccups for the last 2 days.   found to have peaked T waves, a new finding. denied dizziness, N/V, denies CP, palps, abd pain  Upon admission seen by CArd, renal and GI  found to have a distended GB prob 2/2 gastroparesis, was started on Reglan  has received 4 doses of baclofen since 4/30 2/2 hiccups, last dose on 5/1 at 5 am  had received Haldol for agitation at 11 pm on 4/30, AMS observed in pm of 5/1  AMS ongoing, RRT x 2 called  now transferred to MICU for encephalopathy requiring  airway protection on 5/2,  intubated for airway protection, was on  propofol and pressors. now off  toxicology consulted upon dx of encephalopathy, not impressed AMS 2/2 Haldol nor baclofen . AMS was 2/2 acute CVA   HD was not done timely until femoral shiley was placed 2/2 clotted RUE AVF. now removed and RIJ shiley placed on 5/3  has been nonverbal since pm 5/1.     MR brain 5/6 c/w L pontine and L cerebellar acute infarcts, no hemorrhage . as per neuro: embolic in nature.   encephalopathy probably 2/2 acute CVA, now follows commands appropriately off sedation.   no SZ focus on EEG,   off CRRT,  HD resumed as per renal.   remains intubated, now trached  off keppra  AC resumed, was initially on  Heparin drip for R common iliac DVT, now held 2/2 GI bleed  completed 5 days of empiric ZOSYN on 5/7, shortly after became septic again after an extubation trial. bacteremia w B. cereus, on Vanc through 5/20 as per ID    s/p trache placement by pulm,  PEG placed by surgery 5/17, tolerating feeds  HD via R IJ.  Tmax overnight( 5/18)  101, cxr c/w RLL PNA, now on Zosyn, blood Cx s sent. remains on Vanco for B. cereus bacteremia   leukocytosis resolved  EKG changes on 5/3 c/w NSTEMI loaded w DAPT , was  on Heparin drip x 48 hrs. rpt TTE is unchanged  ID, Neuro , renal, cardiology following.  clotted RUE AVF.  fistulogram was cancelled on 5/25 2/2 fever  HD via Shiley, replaced 6/1  LP done, no sign of meningitis.   NEUROLOGY     - CTH without acute findings   - LP done, no acute findings  - MR brain w acute infarcts: L talya and L cerebellum, nonhemorrhagic  - no Sz focus on EEG    CARDIOVASCULAR   - ptn never had CP. just peaked T waves. was awaiting ischemic study w nucl stress test  - TTE showing EF 72%, rpt unchanged  - EKG changes on 5/3, loaded w DAPT  - BP improved.  on Norvasc, Coreg and hydralazine, max doses. as per renal add Imdur to improve BP    GI  - PEG placed, npo w tube feeds  - Gastroparesis       RENAL   -  HD as per renal  - shiley replaced 6/1, plan to be removed after successful HD via AVF on 6/5  - s/p balloon angioplasty of RUE AVF stenosis on 6/4. AVF used for HD on 6/5      INFECTIOUS DISEASE     completed a course of Zosyn, then became septic, bacteremic a B. cereus, completed 3 days of Meropenem, completed vanc through 5/21  on Zosyn since 5/18 for RLL PNA, afebrile, completed 5/23  recurrent fever 5/25, ZOsyn resumed and stopped on 5/28. afebrile since    ENDOCRINE   - DM  - cw ISS    DVT  - RLE, on Heparin drip,     GOC:  Full code  Dispo: acute rehab

## 2024-06-05 NOTE — PROGRESS NOTE ADULT - NS ATTEND AMEND GEN_ALL_CORE FT
agree with above  stable TC  plan for HD today via fistula  then can d/c [facility] plan acute rehab

## 2024-06-05 NOTE — PROGRESS NOTE ADULT - ASSESSMENT
74 y/o M PMhx HTN, ESRD (on HD M/W/F), DM who presented with hiccups x 2 days and chest pain found to have peaked T waves. Hospital course c/b AMS s/p RRT on 5/1 and 5/2. Vascular consulted 2/2 RUE AVF access unable to be used s/p R femoral shiley placed for emergent HD. Transferred to MICU and intubated 5/2 for airway protection.   received empiric course of zosyn x 5 days, completed 5/7 and meropenem x 5 days, completed 5/14  B. cereus bacteremia, s/p vanc, completed 5/20    fever- resolved, ?PNA  s/p zosyn x 7 days, completed 5/27    AMS, CVA  TTE- no evidence of valvular disease  s/p LP 5/2, viral encephalitis/ meningitis ruled out  MRI- Punctate foci of restricted diffusion in the left talya and left cerebellum with associated T2 prolongation consistent with acute infarcts  s/p trach 5/14, s/p PEG 5/17    DVT  CT 5/12- Nonocclusive R common iliac vein deep venous thrombosis.  US LE 5/28- no evidence of DVT    ESRD  s/p RUE AV Fistulogram with balloon angioplasty 6/4    Recommendations  monitor off antibiotics    Steven Torres M.D.  [facility], Division of Infectious Diseases  [phone]  After 5pm on weekdays and all day on weekends - please call [phone]  74 y/o M PMhx HTN, ESRD (on HD M/W/F), DM who presented with hiccups x 2 days and chest pain found to have peaked T waves. Hospital course c/b AMS s/p RRT on 5/1 and 5/2. Vascular consulted 2/2 RUE AVF access unable to be used s/p R femoral shiley placed for emergent HD. Transferred to MICU and intubated 5/2 for airway protection.   received empiric course of zosyn x 5 days, completed 5/7 and meropenem x 5 days, completed 5/14  B. cereus bacteremia, s/p vanc, completed 5/20    fever- resolved, ?PNA  s/p zosyn x 7 days, completed 5/27    AMS, CVA  TTE- no evidence of valvular disease  s/p LP 5/2, viral encephalitis/ meningitis ruled out  MRI- Punctate foci of restricted diffusion in the left talya and left cerebellum with associated T2 prolongation consistent with acute infarcts  s/p trach 5/14, s/p PEG 5/17    DVT  CT 5/12- Nonocclusive R common iliac vein deep venous thrombosis.  US LE 5/28- no evidence of DVT    ESRD  s/p RUE AV Fistulogram with balloon angioplasty 6/4  patient due for hepatitis B vaccination, defer to nephrology for arrangements which can be done at dialysis center    Recommendations  monitor off antibiotics    Steven Torres M.D.  [facility], Division of Infectious Diseases  [phone]  After 5pm on weekdays and all day on weekends - please call [phone]

## 2024-06-05 NOTE — PROGRESS NOTE ADULT - SUBJECTIVE AND OBJECTIVE BOX
Patient is a 73y old  Male who presents with a chief complaint of Unstable angina, abn EKG (05 Jun 2024 12:02)      SUBJECTIVE / OVERNIGHT EVENTS: no new events    MEDICATIONS  (STANDING):  albuterol    0.083% 2.5 milliGRAM(s) Nebulizer every 6 hours  amLODIPine   Tablet 10 milliGRAM(s) Oral daily  atorvastatin 20 milliGRAM(s) Oral at bedtime  carvedilol 25 milliGRAM(s) Oral every 12 hours  chlorhexidine 0.12% Liquid 15 milliLiter(s) Oral Mucosa every 12 hours  chlorhexidine 2% Cloths 1 Application(s) Topical <User Schedule>  dextrose 10% Bolus 125 milliLiter(s) IV Bolus once  dextrose 5%. 1000 milliLiter(s) (100 mL/Hr) IV Continuous <Continuous>  dextrose 5%. 1000 milliLiter(s) (50 mL/Hr) IV Continuous <Continuous>  dextrose 50% Injectable 12.5 Gram(s) IV Push once  dextrose 50% Injectable 25 Gram(s) IV Push once  epoetin reilly (EPOGEN) Injectable 27706 Unit(s) IV Push <User Schedule>  ferrous    sulfate Liquid 300 milliGRAM(s) Enteral Tube daily  glucagon  Injectable 1 milliGRAM(s) IntraMuscular once  heparin  Infusion. 900 Unit(s)/Hr (9 mL/Hr) IV Continuous <Continuous>  hydrALAZINE 100 milliGRAM(s) Oral three times a day  insulin lispro (ADMELOG) corrective regimen sliding scale   SubCutaneous every 6 hours  insulin NPH human recombinant 4 Unit(s) SubCutaneous every 6 hours  pantoprazole  Injectable 40 milliGRAM(s) IV Push every 12 hours    MEDICATIONS  (PRN):  acetaminophen   Oral Liquid .. 650 milliGRAM(s) Oral every 6 hours PRN Temp greater or equal to 38C (100.4F), Moderate Pain (4 - 6)  dextrose Oral Gel 15 Gram(s) Oral once PRN Blood Glucose LESS THAN 70 milliGRAM(s)/deciliter  hydrALAZINE Injectable 10 milliGRAM(s) IV Push every 8 hours PRN SBP > 180  melatonin 3 milliGRAM(s) Oral at bedtime PRN Insomnia  sodium chloride 0.9% lock flush 10 milliLiter(s) IV Push every 1 hour PRN Pre/post blood products, medications, blood draw, and to maintain line patency      Vital Signs Last 24 Hrs  T(F): 98.2 (06-05-24 @ 20:00), Max: 98.2 (06-05-24 @ 20:00)  HR: 64 (06-05-24 @ 20:00) (53 - 66)  BP: 136/57 (06-05-24 @ 20:00) (124/48 - 152/58)  RR: 19 (06-05-24 @ 20:00) (17 - 20)  SpO2: 97% (06-05-24 @ 20:00) (97% - 100%)  Telemetry:   CAPILLARY BLOOD GLUCOSE      POCT Blood Glucose.: 118 mg/dL (05 Jun 2024 17:12)  POCT Blood Glucose.: 142 mg/dL (05 Jun 2024 11:07)  POCT Blood Glucose.: 179 mg/dL (05 Jun 2024 05:20)  POCT Blood Glucose.: 155 mg/dL (04 Jun 2024 23:37)    I&O's Summary    04 Jun 2024 07:01  -  05 Jun 2024 07:00  --------------------------------------------------------  IN: 280 mL / OUT: 0 mL / NET: 280 mL    05 Jun 2024 07:01  -  05 Jun 2024 22:18  --------------------------------------------------------  IN: 400 mL / OUT: 2400 mL / NET: -2000 mL        PHYSICAL EXAM:  GENERAL: NAD, well-developed  HEAD:  Atraumatic, Normocephalic  EYES: EOMI, PERRLA, conjunctiva and sclera clear  NECK: Supple, No JVD  CHEST/LUNG: Clear to auscultation bilaterally; No wheeze  HEART: Regular rate and rhythm; No murmurs, rubs, or gallops  ABDOMEN: Soft, Nontender, Nondistended; Bowel sounds present  EXTREMITIES:  2+ Peripheral Pulses, No clubbing, cyanosis, or edema  PSYCH: AAOx3  NEUROLOGY: non-focal  SKIN: No rashes or lesions    LABS:                        7.0    8.37  )-----------( 379      ( 05 Jun 2024 19:41 )             22.2     06-05    133<L>  |  94<L>  |  44<H>  ----------------------------<  131<H>  3.8   |  25  |  4.69<H>    Ca    8.4      05 Jun 2024 12:55  Phos  4.3     06-05  Mg     2.30     06-05      PT/INR - ( 04 Jun 2024 02:21 )   PT: 11.9 sec;   INR: 1.06 ratio         PTT - ( 05 Jun 2024 12:55 )  PTT:78.4 sec

## 2024-06-05 NOTE — PROGRESS NOTE ADULT - SUBJECTIVE AND OBJECTIVE BOX
CHIEF COMPLAINT: Patient is a 73y old  Male who presents with a chief complaint of Unstable angina, abn EKG (05 Jun 2024 08:00)      Interval Events:    REVIEW OF SYSTEMS:  [ ] All other systems negative  [ ] Unable to assess ROS because ________    Mode: standby      OBJECTIVE:  ICU Vital Signs Last 24 Hrs  T(C): 36.2 (05 Jun 2024 06:00), Max: 37.1 (04 Jun 2024 20:05)  T(F): 97.2 (05 Jun 2024 06:00), Max: 98.7 (04 Jun 2024 20:05)  HR: 53 (05 Jun 2024 08:15) (53 - 80)  BP: 124/50 (05 Jun 2024 06:00) (124/50 - 172/58)  BP(mean): 70 (05 Jun 2024 06:00) (70 - 90)  ABP: --  ABP(mean): --  RR: 18 (05 Jun 2024 08:15) (12 - 20)  SpO2: 100% (05 Jun 2024 08:15) (97% - 100%)    O2 Parameters below as of 05 Jun 2024 08:15  Patient On (Oxygen Delivery Method): tracheostomy collar  O2 Flow (L/min): 7  O2 Concentration (%): 28      Mode: standby    06-04 @ 07:01  -  06-05 @ 07:00  --------------------------------------------------------  IN: 280 mL / OUT: 0 mL / NET: 280 mL      CAPILLARY BLOOD GLUCOSE      POCT Blood Glucose.: 179 mg/dL (05 Jun 2024 05:20)      HOSPITAL MEDICATIONS:  MEDICATIONS  (STANDING):  albuterol    0.083% 2.5 milliGRAM(s) Nebulizer every 6 hours  amLODIPine   Tablet 10 milliGRAM(s) Oral daily  atorvastatin 20 milliGRAM(s) Oral at bedtime  carvedilol 25 milliGRAM(s) Oral every 12 hours  chlorhexidine 0.12% Liquid 15 milliLiter(s) Oral Mucosa every 12 hours  chlorhexidine 2% Cloths 1 Application(s) Topical <User Schedule>  dextrose 10% Bolus 125 milliLiter(s) IV Bolus once  dextrose 5%. 1000 milliLiter(s) (100 mL/Hr) IV Continuous <Continuous>  dextrose 5%. 1000 milliLiter(s) (50 mL/Hr) IV Continuous <Continuous>  dextrose 50% Injectable 12.5 Gram(s) IV Push once  dextrose 50% Injectable 25 Gram(s) IV Push once  epoetin reilly (EPOGEN) Injectable 99925 Unit(s) IV Push <User Schedule>  ferrous    sulfate Liquid 300 milliGRAM(s) Enteral Tube daily  glucagon  Injectable 1 milliGRAM(s) IntraMuscular once  heparin  Infusion. 900 Unit(s)/Hr (9 mL/Hr) IV Continuous <Continuous>  hydrALAZINE 100 milliGRAM(s) Oral three times a day  insulin lispro (ADMELOG) corrective regimen sliding scale   SubCutaneous every 6 hours  insulin NPH human recombinant 4 Unit(s) SubCutaneous every 6 hours  pantoprazole  Injectable 40 milliGRAM(s) IV Push every 12 hours    MEDICATIONS  (PRN):  acetaminophen   Oral Liquid .. 650 milliGRAM(s) Oral every 6 hours PRN Temp greater or equal to 38C (100.4F), Moderate Pain (4 - 6)  dextrose Oral Gel 15 Gram(s) Oral once PRN Blood Glucose LESS THAN 70 milliGRAM(s)/deciliter  hydrALAZINE Injectable 10 milliGRAM(s) IV Push every 8 hours PRN SBP > 180  melatonin 3 milliGRAM(s) Oral at bedtime PRN Insomnia  sodium chloride 0.9% lock flush 10 milliLiter(s) IV Push every 1 hour PRN Pre/post blood products, medications, blood draw, and to maintain line patency      LABS:                        7.3    10.02 )-----------( 414      ( 05 Jun 2024 01:30 )             23.1     06-05    133<L>  |  94<L>  |  49<H>  ----------------------------<  150<H>  4.3   |  25  |  5.15<H>    Ca    8.6      05 Jun 2024 01:30  Phos  4.8     06-05  Mg     2.40     06-05      PT/INR - ( 04 Jun 2024 02:21 )   PT: 11.9 sec;   INR: 1.06 ratio         PTT - ( 05 Jun 2024 01:30 )  PTT:34.0 sec  Urinalysis Basic - ( 05 Jun 2024 01:30 )    Color: x / Appearance: x / SG: x / pH: x  Gluc: 150 mg/dL / Ketone: x  / Bili: x / Urobili: x   Blood: x / Protein: x / Nitrite: x   Leuk Esterase: x / RBC: x / WBC x   Sq Epi: x / Non Sq Epi: x / Bacteria: x            PAST MEDICAL & SURGICAL HISTORY:  Diabetes      Benign essential HTN      HLD (hyperlipidemia)      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 CHIEF COMPLAINT: Patient is a 73y old  Male who presents with a chief complaint of Unstable angina, abn EKG (05 Jun 2024 08:00)    Interval Events: None reported overnight. Clinically unchanged. s/p RUE Fistulogram w balloon angioplasty of outflow stenosis - cleared to access it for HD.                          Pt saturating well on TC 28%. Plan for HD today. If no able to tolerate HD via RUE AVF, will remove R IJ HD access.                          No new nursing issues. VSS, and medications reviewed.     REVIEW OF SYSTEMS:  See above  [x] All other systems negative    Mode: standby    OBJECTIVE:  ICU Vital Signs Last 24 Hrs  T(C): 36.2 (05 Jun 2024 06:00), Max: 37.1 (04 Jun 2024 20:05)  T(F): 97.2 (05 Jun 2024 06:00), Max: 98.7 (04 Jun 2024 20:05)  HR: 53 (05 Jun 2024 08:15) (53 - 80)  BP: 124/50 (05 Jun 2024 06:00) (124/50 - 172/58)  BP(mean): 70 (05 Jun 2024 06:00) (70 - 90)  ABP: --  ABP(mean): --  RR: 18 (05 Jun 2024 08:15) (12 - 20)  SpO2: 100% (05 Jun 2024 08:15) (97% - 100%)    O2 Parameters below as of 05 Jun 2024 08:15  Patient On (Oxygen Delivery Method): tracheostomy collar  O2 Flow (L/min): 7  O2 Concentration (%): 28    Mode: standby    06-04 @ 07:01  -  06-05 @ 07:00  --------------------------------------------------------  IN: 280 mL / OUT: 0 mL / NET: 280 mL    CAPILLARY BLOOD GLUCOSE    POCT Blood Glucose.: 179 mg/dL (05 Jun 2024 05:20)    HOSPITAL MEDICATIONS:  MEDICATIONS  (STANDING):  albuterol    0.083% 2.5 milliGRAM(s) Nebulizer every 6 hours  amLODIPine   Tablet 10 milliGRAM(s) Oral daily  atorvastatin 20 milliGRAM(s) Oral at bedtime  carvedilol 25 milliGRAM(s) Oral every 12 hours  chlorhexidine 0.12% Liquid 15 milliLiter(s) Oral Mucosa every 12 hours  chlorhexidine 2% Cloths 1 Application(s) Topical <User Schedule>  dextrose 10% Bolus 125 milliLiter(s) IV Bolus once  dextrose 5%. 1000 milliLiter(s) (100 mL/Hr) IV Continuous <Continuous>  dextrose 5%. 1000 milliLiter(s) (50 mL/Hr) IV Continuous <Continuous>  dextrose 50% Injectable 12.5 Gram(s) IV Push once  dextrose 50% Injectable 25 Gram(s) IV Push once  epoetin reilly (EPOGEN) Injectable 66285 Unit(s) IV Push <User Schedule>  ferrous    sulfate Liquid 300 milliGRAM(s) Enteral Tube daily  glucagon  Injectable 1 milliGRAM(s) IntraMuscular once  heparin  Infusion. 900 Unit(s)/Hr (9 mL/Hr) IV Continuous <Continuous>  hydrALAZINE 100 milliGRAM(s) Oral three times a day  insulin lispro (ADMELOG) corrective regimen sliding scale   SubCutaneous every 6 hours  insulin NPH human recombinant 4 Unit(s) SubCutaneous every 6 hours  pantoprazole  Injectable 40 milliGRAM(s) IV Push every 12 hours    MEDICATIONS  (PRN):  acetaminophen   Oral Liquid .. 650 milliGRAM(s) Oral every 6 hours PRN Temp greater or equal to 38C (100.4F), Moderate Pain (4 - 6)  dextrose Oral Gel 15 Gram(s) Oral once PRN Blood Glucose LESS THAN 70 milliGRAM(s)/deciliter  hydrALAZINE Injectable 10 milliGRAM(s) IV Push every 8 hours PRN SBP > 180  melatonin 3 milliGRAM(s) Oral at bedtime PRN Insomnia  sodium chloride 0.9% lock flush 10 milliLiter(s) IV Push every 1 hour PRN Pre/post blood products, medications, blood draw, and to maintain line patency    PHYSICAL EXAMINATION  General: NAD   HEENT: R IJ SHILEY and trach present   Cards: S1/S2, no murmurs   Pulm: CTA bilaterally. No wheezes.   Abdomen: Soft, nondistended and nontender. BS (+) PEG present.   Extremities: No pedal edema. THAI of BL upper and lower extremities with weakness. +palpable thrill over RUE fistula  MSK: No asymmetry no calf tenderness.   NEURO: non focal, following commands, awake and alert    LABS:                        7.3    10.02 )-----------( 414      ( 05 Jun 2024 01:30 )             23.1     06-05    133<L>  |  94<L>  |  49<H>  ----------------------------<  150<H>  4.3   |  25  |  5.15<H>    Ca    8.6      05 Jun 2024 01:30  Phos  4.8     06-05  Mg     2.40     06-05      PT/INR - ( 04 Jun 2024 02:21 )   PT: 11.9 sec;   INR: 1.06 ratio         PTT - ( 05 Jun 2024 01:30 )  PTT:34.0 sec  Urinalysis Basic - ( 05 Jun 2024 01:30 )    Color: x / Appearance: x / SG: x / pH: x  Gluc: 150 mg/dL / Ketone: x  / Bili: x / Urobili: x   Blood: x / Protein: x / Nitrite: x   Leuk Esterase: x / RBC: x / WBC x   Sq Epi: x / Non Sq Epi: x / Bacteria: x    PAST MEDICAL & SURGICAL HISTORY:  Diabetes    Benign essential HTN    HLD (hyperlipidemia)    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    ESRD on hemodialysis    Arteriovenous fistula    FAMILY HISTORY:  FHx: diabetes mellitus (Father, Aunt)    Social History:    RADIOLOGY:  [ ] Reviewed and interpreted by me    PULMONARY FUNCTION TESTS:    EKG:

## 2024-06-05 NOTE — PROGRESS NOTE ADULT - ASSESSMENT
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 Baclofen started and course complicated by AMS second to baclofen toxicity requiring intubation and MICU transfer. Course further complicated by LEFT pontine and cerebellar stroke, R AVF stenosis, aspiration and B Cereus bacteremia and RLE DVT. s/p trach and transferred to RCU 5/16 and course complicated by intermittent fever spikes with likely aspiration citrobacter PNA, AOCD, and GIB vs Fe stools.     NEUROLOGY  # AMS   - MRI HEAD (5/6) with punctate foci in the left talya and left cerebellum with concern for acute infarct.   - MRA HEAD and NECK (5/6) with no large vessel occlusion or stenosis.  - Hold DAPT for now pending procedures   - Continue on Lipitor for medical management     PSYCH   # Depression   - Concern for depression with questionable SI   - CO started, however formal discussion and evaluation with no true SI   - Supportive care and encouragement QD    CARDIOVASCULAR  # CP with NSTEMI < demand ischemia with HTN   - Admitted for CP and HTN with peaked T WAVES and ECG with ST deviation on admission   - Troponins mildly elevated on admission with negative CKMB   - TTE (4/30) with EF 72, normal LVRVSF, and no regional wall motion abnormalities   - Case discussed with cardiology and no acute need for cath.     # Septic vs vasoplegic shock  # Hx of HTN   - Home BP medications held and pressors weaned off   - Continue on coreg 25 (increased 6/1), hydralazine 100 TID and Norvasc 10mg (increased 5/31)   - Monitor blood pressures with Hydralazine 10mg IVP Q8H PRN   - IF remains hypertensive then add Isordil     RESPIRATORY  # Respiratory failure   - AMS noted with baclofen toxicity requiring intubation   - Attempted extubation in MICU however course complicated by aspiration and prolonged course and now s/p tracheostomy with IP on 5/14  - Continue on vent 12/500/5/30 and weaned to TC ATC   - Continue on Albuterol with TC ATC (5/30 - )   - Able to tolerate PMV during the day with good phonation     GI  # Dysphagia   - s/p surgical PEG 5/17   - Failed green dye on 6/1 and continue on tube feeds via PEG   - Continue with PMV during the day this week and reattempt SS sometime next week.     # GIB  - Noted with several episodes of black stool with drop in Hgb (5/26 overnight) requiring PRBC with appropriate response  - Case discussed with GI, questionable anemia from AOCD with ESRD and black stool likely from iron, and no plan for scope at this time  - Continue on PPI and monitor stools    RENAL  # ESRD on HD MWF with R BCF  # Fistula stenosis   - VA AVF (5/2) with Right radiocephalic arteriovenous fistula has no hemodynamically significant stenosis present at the anastomotic site ( cm/sec, EDV 49 cm/sec). The usable segment is partially thrombosed with minimal patency.  - Required R FEMORAL line on medicine, then removed on 5/2, and replaced with R IJ SHILEY on 5/3 for CRRT then converted back to iHD MWF   - R IJ SHILEY removed on 5/10 second to bacteremia and replaced on 5/13  - R IJ SHILEY removed 5/27 given intermittent fevers    - Blood cultures negative and replaced SHILEY on 6/1   - Vein mapping with no adequate veins in UE for conduit (all <2 mm and/or thrombosed) and pending possible revision of RUE AVF.   - Plan for fistulogram and fistula repair TODAY  - Continue on HD MWF per Renal   - Pending permacath placement when medically cleared     # Hyperphosphatemia   - Continue on Renvela 1600 however phos corrects well with HD   - Monitor off Renvela     INFECTIOUS DISEASE  # Aspiration concern   - Recurrent fever spike and CXR with RLL opacity   - BCx (5/25 and 5/30) negative   - SCx (5/25) negative   - SCx (5/30) with citrobacter and completed Zosyn empirically (5/18 - 5/28)    # B Cereus Bacteremia   - Course complicated by fevers and aspiration event   - MRSA (5/1) negative   - BCx (5/2) negative   - SCx (5/4) and BAL (5/12) negative   - BCx (5/10) with bacillus cereus and cleared on (5/12).   - Case discussed with ID and s/p Vancomycin through 5/21 x 10 day course.   - Continue to monitor off antibiotics per ID     HEME  # AOCD with ESRD   - Anemia panel with AOCD and low % sat   - EPO 10K continued on TIW   - Ferrous supplement  - Transfused on 5/16 and 5/26 and will monitor HH     VASCULAR   # RLE DVT   - CT AP (5/12) with nonocclusive RIGHT common iliac vein deep venous thrombosis and case discussed with IR with no plans for IVC filter.   - Initially started on a Heparin GTT with course complicated by questionable GIB given dark tarry stools, however more likely second to iron tabs.   - Continue on heparin GTT and monitor, subtheraputic this morning, follow up PTT    ENDOCRINE  # IDDM2 A1C 6.9  - c/w NPH 4U Q6 and ISS  - Monitor and adjust insulin based on FS     SKIN  - R FEMORAL (5/1-5/2)   - R IJ SHILEY (5/3-5/10)   - R IJ SHILEY (5/13 - 5/27)   - R IJ SHILEY (6/1 - )     ETHICS/ GOC    - FULL CODE     DISPO - PMR recc ACUTE   ASSESSMENT   74 YO M with PMHx of ESRD on HD MWF, HTN, and 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 Baclofen started and course complicated by AMS second to baclofen toxicity requiring intubation and MICU transfer. Course further complicated by LEFT pontine and cerebellar stroke, R AVF stenosis, aspiration and B Cereus bacteremia and RLE DVT. s/p trach and transferred to RCU 5/16 and course complicated by intermittent fever spikes with likely aspiration citrobacter PNA, AOCD, and GIB vs Fe stools.     PLAN  NEUROLOGY  # AMS   - MRI HEAD (5/6) with punctate foci in the left talya and left cerebellum with concern for acute infarct.   - MRA HEAD and NECK (5/6) with no large vessel occlusion or stenosis.  - Hold DAPT for now pending procedures   - Continue on Lipitor for medical management     PSYCH   # Depression   - Concern for depression with questionable SI   - CO started, however formal discussion and evaluation with no true SI   - Supportive care and encouragement QD    CARDIOVASCULAR  # CP with NSTEMI < demand ischemia with HTN   - Admitted for CP and HTN with peaked T WAVES and ECG with ST deviation on admission   - Troponins mildly elevated on admission with negative CKMB   - TTE (4/30) with EF 72, normal LVRVSF, and no regional wall motion abnormalities   ** Type II myocardial infarction 2/2 distributive shock earlier on admission.  - TTE (4/30) with EF 72, normal LVRVSF, and no regional wall motion abnormalities    - Cardiology following - No need to pursue cath at this time given debility and chronic respiratory failure s/p trach 5/14.     # Septic vs vasoplegic shock  # Hx of HTN   - Home BP medications held and pressors weaned off   - Continue on coreg 25 (increased 6/1), hydralazine 100 TID and Norvasc 10mg (increased 5/31)   - Monitor blood pressures with Hydralazine 10mg IVP Q8H PRN   - IF remains hypertensive then add Isordil     RESPIRATORY  # Respiratory failure   - AMS noted with baclofen toxicity requiring intubation   - Attempted extubation in MICU however course complicated by aspiration and prolonged course and now s/p tracheostomy with IP on 5/14  - Continue on vent 12/500/5/30 and weaned to TC ATC   - Continue on Albuterol with TC ATC (5/30 - )   - Able to tolerate PMV during the day with good phonation     GI  # Dysphagia   - s/p surgical PEG 5/17   - Failed green dye on 6/1 and continue on tube feeds via PEG   - Continue with PMV during the day this week and reattempt SS sometime next week.     # GIB  - Noted with several episodes of black stool with drop in Hgb (5/26 overnight) requiring PRBC with appropriate response  - Case discussed with GI, questionable anemia from AOCD with ESRD and black stool likely from iron, and no plan for scope at this time  - Continue on PPI and monitor stools    RENAL  # ESRD on HD MWF with R BCF  # Fistula stenosis   - VA AVF (5/2) with Right radiocephalic arteriovenous fistula has no hemodynamically significant stenosis present at the anastomotic site ( cm/sec, EDV 49 cm/sec). The usable segment is partially thrombosed with minimal patency.  - Required R FEMORAL line on medicine, then removed on 5/2, and replaced with R IJ SHILEY on 5/3 for CRRT then converted back to iHD MWF   - R IJ SHILEY removed on 5/10 second to bacteremia and replaced on 5/13  - R IJ SHILEY removed 5/27 given intermittent fevers    - Blood cultures negative and replaced SHILEY on 6/1   - Vein mapping with no adequate veins in UE for conduit (all <2 mm and/or thrombosed) and pending possible revision of RUE AVF.   - s/p RUE Fistulogram on 6/4 - site can be accessed for HD today  - Continue on HD MWF per Renal     # Hyperphosphatemia   - Continue on Renvela 1600 however phos corrects well with HD   - Monitor off Renvela     INFECTIOUS DISEASE  # Aspiration concern   - Recurrent fever spike and CXR with RLL opacity   - BCx (5/25 and 5/30) negative   - SCx (5/25) negative   - SCx (5/30) with citrobacter and completed Zosyn empirically (5/18 - 5/28)    # B Cereus Bacteremia   - Course complicated by fevers and aspiration event   - MRSA (5/1) negative   - BCx (5/2) negative   - SCx (5/4) and BAL (5/12) negative   - BCx (5/10) with bacillus cereus and cleared on (5/12).   - Case discussed with ID and s/p Vancomycin through 5/21 x 10 day course.   - Continue to monitor off antibiotics per ID     HEME  # AOCD with ESRD   - Anemia panel with AOCD and low % sat   - EPO 10K continued on TIW   - Ferrous supplement  - Transfused on 5/16 and 5/26 and will monitor HH     VASCULAR   # RLE DVT   - CT AP (5/12) with nonocclusive RIGHT common iliac vein deep venous thrombosis and case discussed with IR with no plans for IVC filter.   - Initially started on a Heparin GTT with course complicated by questionable GIB given dark tarry stools, however more likely second to iron tabs.   - Continue on heparin GTT and monitor    ENDOCRINE  # IDDM2 A1C 6.9  - c/w NPH 4U Q6 and ISS  - Monitor and adjust insulin based on FS     SKIN  - R FEMORAL (5/1-5/2)   - R IJ SHILEY (5/3-5/10)   - R IJ SHILEY (5/13 - 5/27)   - R IJ SHILEY (6/1 - )     ETHICS/ GOC    - FULL CODE     DISPO - PMR recc ACUTE

## 2024-06-05 NOTE — PROGRESS NOTE ADULT - SUBJECTIVE AND OBJECTIVE BOX
Patient is a 73y old  Male who presents with a chief complaint of Unstable angina, abn EKG (05 Jun 2024 11:46)    Interval history: sleepy today after fistula procedure yesterday.    family at bedside report patient tolerating bedside therapy    REVIEW OF SYSTEMS  sleepy today  weakness    PAST MEDICAL & SURGICAL HISTORY  Diabetes    Stage 5 chronic kidney disease not on chronic dialysis    Benign essential HTN    HLD (hyperlipidemia)    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    ESRD on hemodialysis    No significant past surgical history    AVF (arteriovenous fistula)    Arteriovenous fistula         CURRENT FUNCTIONAL STATUS  6/3   Bed Mobility  Bed Mobility Training Rehab Potential: good, to achieve stated therapy goals  Bed Mobility Training Symptoms Noted During/After Treatment: none  Bed Mobility Training Sit-to-Supine: moderate assist (50% patient effort);  2 person assist;  nonverbal cues (demo/gestures);  verbal cues;  bed rails  Bed Mobility Training Supine-to-Sit: moderate assist (50% patient effort);  2 person assist;  nonverbal cues (demo/gestures);  verbal cues;  bed rails  Bed Mobility Training Limitations: impaired ability to control trunk for mobility;  decreased ability to use arms for pushing/pulling;  decreased ability to use legs for bridging/pushing;  impaired balance;  decreased strength    Sit-Stand Transfer Training  Sit-to-Stand Transfer Training Rehab Potential: good, to achieve stated therapy goals  Sit-to-Stand Transfer Training Symptoms Noted During/After Treatment: none  Transfer Training Sit-to-Stand Transfer: moderate assist (50% patient effort);  2 person assist;  nonverbal cues (demo/gestures);  verbal cues;  full weight-bearing   rolling walker  Transfer Training Stand-to-Sit Transfer: moderate assist (50% patient effort);  2 person assist;  nonverbal cues (demo/gestures);  verbal cues;  full weight-bearing   rolling walker  Sit-to-Stand Transfer Training Transfer Safety Analysis: decreased weight-shifting ability;  decreased strength;  impaired balance;  rolling walker    Therapeutic Exercise  Therapeutic Exercise Rehab Effort: good  Therapeutic Exercise Symptoms Noted During/After Treatment: none  Therapeutic Exercise Detail: Patient participated in active seated therapeutic exercises 1 x 10 throughout bilateral lower extremities.         FAMILY HISTORY   FHx: diabetes mellitus (Father, Aunt)        RECENT LABS/IMAGING  CBC Full  -  ( 05 Jun 2024 01:30 )  WBC Count : 10.02 K/uL  RBC Count : 3.35 M/uL  Hemoglobin : 7.3 g/dL  Hematocrit : 23.1 %  Platelet Count - Automated : 414 K/uL  Mean Cell Volume : 69.0 fL  Mean Cell Hemoglobin : 21.8 pg  Mean Cell Hemoglobin Concentration : 31.6 gm/dL  Auto Neutrophil # : x  Auto Lymphocyte # : x  Auto Monocyte # : x  Auto Eosinophil # : x  Auto Basophil # : x  Auto Neutrophil % : x  Auto Lymphocyte % : x  Auto Monocyte % : x  Auto Eosinophil % : x  Auto Basophil % : x    06-05    133<L>  |  94<L>  |  49<H>  ----------------------------<  150<H>  4.3   |  25  |  5.15<H>    Ca    8.6      05 Jun 2024 01:30  Phos  4.8     06-05  Mg     2.40     06-05      Urinalysis Basic - ( 05 Jun 2024 01:30 )    Color: x / Appearance: x / SG: x / pH: x  Gluc: 150 mg/dL / Ketone: x  / Bili: x / Urobili: x   Blood: x / Protein: x / Nitrite: x   Leuk Esterase: x / RBC: x / WBC x   Sq Epi: x / Non Sq Epi: x / Bacteria: x        VITALS  T(C): 36.3 (06-05-24 @ 08:00), Max: 37.1 (06-04-24 @ 20:05)  HR: 56 (06-05-24 @ 11:56) (53 - 80)  BP: 147/56 (06-05-24 @ 11:56) (124/48 - 172/58)  RR: 18 (06-05-24 @ 08:15) (12 - 20)  SpO2: 100% (06-05-24 @ 08:15) (97% - 100%)  Wt(kg): --    ALLERGIES  No Known Allergies      MEDICATIONS   acetaminophen   Oral Liquid .. 650 milliGRAM(s) Oral every 6 hours PRN  albuterol    0.083% 2.5 milliGRAM(s) Nebulizer every 6 hours  amLODIPine   Tablet 10 milliGRAM(s) Oral daily  atorvastatin 20 milliGRAM(s) Oral at bedtime  carvedilol 25 milliGRAM(s) Oral every 12 hours  chlorhexidine 0.12% Liquid 15 milliLiter(s) Oral Mucosa every 12 hours  chlorhexidine 2% Cloths 1 Application(s) Topical <User Schedule>  dextrose 10% Bolus 125 milliLiter(s) IV Bolus once  dextrose 5%. 1000 milliLiter(s) IV Continuous <Continuous>  dextrose 5%. 1000 milliLiter(s) IV Continuous <Continuous>  dextrose 50% Injectable 12.5 Gram(s) IV Push once  dextrose 50% Injectable 25 Gram(s) IV Push once  dextrose Oral Gel 15 Gram(s) Oral once PRN  epoetin reilly (EPOGEN) Injectable 91661 Unit(s) IV Push <User Schedule>  ferrous    sulfate Liquid 300 milliGRAM(s) Enteral Tube daily  glucagon  Injectable 1 milliGRAM(s) IntraMuscular once  heparin  Infusion. 900 Unit(s)/Hr IV Continuous <Continuous>  hydrALAZINE 100 milliGRAM(s) Oral three times a day  hydrALAZINE Injectable 10 milliGRAM(s) IV Push every 8 hours PRN  insulin lispro (ADMELOG) corrective regimen sliding scale   SubCutaneous every 6 hours  insulin NPH human recombinant 4 Unit(s) SubCutaneous every 6 hours  melatonin 3 milliGRAM(s) Oral at bedtime PRN  pantoprazole  Injectable 40 milliGRAM(s) IV Push every 12 hours  sodium chloride 0.9% lock flush 10 milliLiter(s) IV Push every 1 hour PRN      ----------------------------------------------------------------------------------------  PHYSICAL EXAM    Constitutional - NAD   HEENT - + tc  Chest -no respiratory distress  Cardiovascular - RRR, S1S2   Abdomen -  +PEG, Soft, NTND  Extremities - No C/C/E, No calf tenderness   Neurologic Exam -                    Cognitive - Awake, Alert      Communication - Fluent, No dysarthria     Cranial Nerves - CN 2-12 intact     Motor -                      LEFT    UE - ShAB 4/5, EF 4/5, EE 4/5, WE 4/5,  4/5                    RIGHT UE - ShAB 4/5, EF 4/5, EE 4/5, WE 4/5,  4/5                    LEFT    LE - HF 3/5, KE 3/5, DF 3/5, PF 3/5                    RIGHT LE - HF 3/5, KE 3/5, DF 3/5, PF 3/5        Sensory - Intact to LT           Balance - WNL Static  Psychiatric - Mood stable, Affect WNL  ----------------------------------------------------------------------------------------  ASSESSMENT/PLAN  73 year old male, history of ESRD on HD MWF, HTN, and IDDM2 A1C 6.9 who presented chest pain complicated by hiccups x 2 days and admitted for NSTEMI, course complicated by baclofen toxicity, GI bleed, dvt  continue bedside PT  Please request OT  SLP    DVT PPX - heparin sq  out of bed to chair as tolerates  Rehab - recommend acute inpatient rehab when medically cleared. Patient can tolerate 3 hours per day of therapy with medical supervision

## 2024-06-05 NOTE — PROGRESS NOTE ADULT - SUBJECTIVE AND OBJECTIVE BOX
[facility], Division of Infectious Diseases  AUGUST Carbajal Y. Patel, S. Shah, G. Brian  [phone]  ([phone] - weekdays after 5pm and weekends)    Name: JIMMY BLAND  Age/Gender: 73y Male  MRN: [id number]    Interval History:  Notes reviewed.   No concerning overnight events.  Afebrile.   s/p RUE AV Fistulogram with balloon angioplasty yesterday    Allergies: No Known Allergies      Objective:  Vitals:   T(F): 97.2 (06-05-24 @ 06:00), Max: 98.7 (06-04-24 @ 20:05)  HR: 53 (06-05-24 @ 08:15) (53 - 80)  BP: 124/50 (06-05-24 @ 06:00) (124/50 - 172/58)  RR: 18 (06-05-24 @ 08:15) (12 - 20)  SpO2: 100% (06-05-24 @ 08:15) (97% - 100%)  Physical Examination:  General: no acute distress  HEENT: tracheostomy  Heart: S1, S2, normal rate  Lungs: clear to auscultation anteriorly, on trach collar  Abdomen: Soft. ND. NT  Extremities: No LE edema.   Skin: Warm. Dry.     Laboratory Studies:  CBC:                       7.3    10.02 )-----------( 414      ( 05 Jun 2024 01:30 )             23.1     WBC Trend:  10.02 06-05-24 @ 01:30  10.60 06-04-24 @ 02:21  11.20 06-03-24 @ 05:35  10.33 06-02-24 @ 23:15  9.43 06-02-24 @ 17:47  9.46 06-02-24 @ 06:00  11.03 06-01-24 @ 06:00  10.08 05-31-24 @ 05:10  10.31 05-30-24 @ 18:10  8.79 05-30-24 @ 06:57  10.18 05-29-24 @ 18:40    CMP: 06-05    133<L>  |  94<L>  |  49<H>  ----------------------------<  150<H>  4.3   |  25  |  5.15<H>    Ca    8.6      05 Jun 2024 01:30  Phos  4.8     06-05  Mg     2.40     06-05            Urinalysis Basic - ( 05 Jun 2024 01:30 )    Color: x / Appearance: x / SG: x / pH: x  Gluc: 150 mg/dL / Ketone: x  / Bili: x / Urobili: x   Blood: x / Protein: x / Nitrite: x   Leuk Esterase: x / RBC: x / WBC x   Sq Epi: x / Non Sq Epi: x / Bacteria: x      Microbiology: reviewed     Culture - Sputum (collected 05-30-24 @ 11:44)  Source: .Sputum Sputum  Gram Stain (05-30-24 @ 19:45):    Moderate polymorphonuclear leukocytes per low power field    Few Squamous epithelial cells per low power field    Few Gram Negative Rods per oil power field  Final Report (06-01-24 @ 12:02):    Moderate Citrobacter koseri    Normal Respiratory Jocelyn present  Organism: Citrobacter koseri (06-01-24 @ 12:02)  Organism: Citrobacter koseri (06-01-24 @ 12:02)      Method Type: VIDA      -  Amoxicillin/Clavulanic Acid: S <=8/4      -  Ampicillin: R >16 These ampicillin results predict results for amoxicillin      -  Ampicillin/Sulbactam: S <=4/2      -  Aztreonam: S <=4      -  Cefazolin: S <=2      -  Cefepime: S <=2      -  Cefoxitin: S <=8      -  Ceftriaxone: S <=1      -  Ciprofloxacin: S <=0.25      -  Ertapenem: S <=0.5      -  Gentamicin: S <=2      -  Imipenem: S <=1      -  Levofloxacin: S <=0.5      -  Meropenem: S <=1      -  Piperacillin/Tazobactam: S <=8      -  Tobramycin: S <=2      -  Trimethoprim/Sulfamethoxazole: S <=0.5/9.5    Culture - Blood (collected 05-30-24 @ 09:03)  Source: .Blood Blood-Peripheral  Final Report (06-04-24 @ 15:00):    No growth at 5 days    Culture - Blood (collected 05-30-24 @ 08:48)  Source: .Blood Blood-Venous  Final Report (06-04-24 @ 15:00):    No growth at 5 days    Culture - Sputum (collected 05-25-24 @ 21:28)  Source: Trach Asp Tracheal Aspirate  Gram Stain (05-26-24 @ 13:57):    Few polymorphonuclear leukocytes per low power field    Rare Gram Negative Rods per oil power field  Final Report (05-27-24 @ 18:28):    Normal Respiratory Jocelyn present    Culture - Blood (collected 05-25-24 @ 19:00)  Source: .Blood Blood-Peripheral  Final Report (05-30-24 @ 22:01):    No growth at 5 days    Culture - Blood (collected 05-25-24 @ 18:45)  Source: .Blood Blood-Peripheral  Final Report (05-30-24 @ 22:01):    No growth at 5 days        Radiology: reviewed     Medications:  acetaminophen   Oral Liquid .. 650 milliGRAM(s) Oral every 6 hours PRN  albuterol    0.083% 2.5 milliGRAM(s) Nebulizer every 6 hours  amLODIPine   Tablet 10 milliGRAM(s) Oral daily  atorvastatin 20 milliGRAM(s) Oral at bedtime  carvedilol 25 milliGRAM(s) Oral every 12 hours  chlorhexidine 0.12% Liquid 15 milliLiter(s) Oral Mucosa every 12 hours  chlorhexidine 2% Cloths 1 Application(s) Topical <User Schedule>  dextrose 10% Bolus 125 milliLiter(s) IV Bolus once  dextrose 5%. 1000 milliLiter(s) IV Continuous <Continuous>  dextrose 5%. 1000 milliLiter(s) IV Continuous <Continuous>  dextrose 50% Injectable 12.5 Gram(s) IV Push once  dextrose 50% Injectable 25 Gram(s) IV Push once  dextrose Oral Gel 15 Gram(s) Oral once PRN  epoetin reilly (EPOGEN) Injectable 68222 Unit(s) IV Push <User Schedule>  ferrous    sulfate Liquid 300 milliGRAM(s) Enteral Tube daily  glucagon  Injectable 1 milliGRAM(s) IntraMuscular once  heparin  Infusion. 900 Unit(s)/Hr IV Continuous <Continuous>  hydrALAZINE 100 milliGRAM(s) Oral three times a day  hydrALAZINE Injectable 10 milliGRAM(s) IV Push every 8 hours PRN  insulin lispro (ADMELOG) corrective regimen sliding scale   SubCutaneous every 6 hours  insulin NPH human recombinant 4 Unit(s) SubCutaneous every 6 hours  melatonin 3 milliGRAM(s) Oral at bedtime PRN  pantoprazole  Injectable 40 milliGRAM(s) IV Push every 12 hours  sodium chloride 0.9% lock flush 10 milliLiter(s) IV Push every 1 hour PRN    Antimicrobials:

## 2024-06-05 NOTE — PROGRESS NOTE ADULT - SUBJECTIVE AND OBJECTIVE BOX
Patient is a 73y Male     Patient is a 73y old  Male who presents with a chief complaint of Unstable angina, abn EKG (04 Jun 2024 21:47)      HPI:  73-year-old male past medical history hypertension, ESRD on dialysis Monday Wednesday Friday, diabetes insulin-dependent presents with hiccups patient endorses persistent hiccups for the last 2 days. found to have peaked T waves, a new finding. denies dizziness, N/V, denies CP, palps, abd pain (29 Apr 2024 21:15)      PAST MEDICAL & SURGICAL HISTORY:  Diabetes      Benign essential HTN      HLD (hyperlipidemia)      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      ESRD on hemodialysis      Arteriovenous fistula          MEDICATIONS  (STANDING):  albuterol    0.083% 2.5 milliGRAM(s) Nebulizer every 6 hours  amLODIPine   Tablet 10 milliGRAM(s) Oral daily  atorvastatin 20 milliGRAM(s) Oral at bedtime  carvedilol 25 milliGRAM(s) Oral every 12 hours  chlorhexidine 0.12% Liquid 15 milliLiter(s) Oral Mucosa every 12 hours  chlorhexidine 2% Cloths 1 Application(s) Topical <User Schedule>  dextrose 10% Bolus 125 milliLiter(s) IV Bolus once  dextrose 5%. 1000 milliLiter(s) (50 mL/Hr) IV Continuous <Continuous>  dextrose 5%. 1000 milliLiter(s) (100 mL/Hr) IV Continuous <Continuous>  dextrose 50% Injectable 12.5 Gram(s) IV Push once  dextrose 50% Injectable 25 Gram(s) IV Push once  epoetin reilly (EPOGEN) Injectable 04446 Unit(s) IV Push <User Schedule>  ferrous    sulfate Liquid 300 milliGRAM(s) Enteral Tube daily  glucagon  Injectable 1 milliGRAM(s) IntraMuscular once  heparin  Infusion. 900 Unit(s)/Hr (9 mL/Hr) IV Continuous <Continuous>  hydrALAZINE 100 milliGRAM(s) Oral three times a day  insulin lispro (ADMELOG) corrective regimen sliding scale   SubCutaneous every 6 hours  insulin NPH human recombinant 4 Unit(s) SubCutaneous every 6 hours  pantoprazole  Injectable 40 milliGRAM(s) IV Push every 12 hours      Allergies    No Known Allergies    Intolerances        SOCIAL HISTORY:  Denies ETOh,Smoking,     FAMILY HISTORY:  FHx: diabetes mellitus (Father, Aunt)        REVIEW OF SYSTEMS:    CONSTITUTIONAL: No weakness, fevers or chills  EYES/ENT: No visual changes;  No vertigo or throat pain   NECK: No pain or stiffness  RESPIRATORY: No cough, wheezing, hemoptysis; No shortness of breath  CARDIOVASCULAR: No chest pain or palpitations  GASTROINTESTINAL: No abdominal or epigastric pain. No nausea, vomiting, or hematemesis; No diarrhea or constipation. No melena or hematochezia.  GENITOURINARY: No dysuria, frequency or hematuria  NEUROLOGICAL: No numbness or weakness  SKIN: No itching, burning, rashes, or lesions   All other review of systems is negative unless indicated above.    VITAL:  T(C): , Max: 37.1 (06-04-24 @ 20:05)  T(F): , Max: 98.7 (06-04-24 @ 20:05)  HR: 53 (06-05-24 @ 06:00)  BP: 124/50 (06-05-24 @ 06:00)  BP(mean): 70 (06-05-24 @ 06:00)  RR: 18 (06-05-24 @ 06:00)  SpO2: 99% (06-05-24 @ 06:00)  Wt(kg): --    I and O's:    06-02 @ 07:01  -  06-03 @ 07:00  --------------------------------------------------------  IN: 1640 mL / OUT: 0 mL / NET: 1640 mL    06-03 @ 07:01  -  06-04 @ 07:00  --------------------------------------------------------  IN: 825 mL / OUT: 2200 mL / NET: -1375 mL    06-04 @ 07:01  -  06-05 @ 06:28  --------------------------------------------------------  IN: 280 mL / OUT: 0 mL / NET: 280 mL      Height (cm): 172.7 (06-04 @ 17:09)  Weight (kg): 52.6 (06-04 @ 17:09)  BMI (kg/m2): 17.6 (06-04 @ 17:09)  BSA (m2): 1.62 (06-04 @ 17:09)    PHYSICAL EXAM:    Constitutional: NAD  HEENT: PERRLA,   Neck: No JVD  Respiratory: CTA B/L  Cardiovascular: S1 and S2  Gastrointestinal: BS+, soft, NT/ND  Extremities: No peripheral edema  Neurological: A/O x 3, no focal deficits  Psychiatric: Normal mood, normal affect  : No Abbasi  Skin: No rashes  Access: Not applicable  Back: No CVA tenderness    LABS:                        7.3    10.02 )-----------( 414      ( 05 Jun 2024 01:30 )             23.1     06-05    133<L>  |  94<L>  |  49<H>  ----------------------------<  150<H>  4.3   |  25  |  5.15<H>    Ca    8.6      05 Jun 2024 01:30  Phos  4.8     06-05  Mg     2.40     06-05            RADIOLOGY & ADDITIONAL STUDIES:

## 2024-06-05 NOTE — PROGRESS NOTE ADULT - ASSESSMENT
73-year-old male past medical history hypertension, ESRD on dialysis Monday Wednesday Friday, diabetes insulin-dependent presents with hiccups patient endorses persistent hiccups for the last 2 days. Nephrology consulted for HD needs.    A/P  ESRD:  Center: [facility]  Nephrologist: Dr. Hardwick  Access: R AVF nonfunction now s/p thrombectomy 6/4. plan for hd via AVF today  MWF schedule, last hd 6/3 uf 1.8L, hd today  Consent obtained and placed in ED chart  Renal diet  Monitor BMP  Consider [facility] for rehab where his outpatient Nephrologist, Dr. Hardwick can follow him    Hyperkalemia/Hypokalemia  Improved w/ HD.  C/W HD schedule as above.  Lokelma 10gm PRN for K >5.3 on non-HD days  K stable.  Low K diet.  Monitor closely.    HTN:  BP fluctuating but acceptable   On carvedilol 12.5mg BID, hydralazine 100mg TID.  on norvasc   UF w/ HD as BP permits.  Monitor BP.    Anemia:  Hgb low.  + iron deficiency.  Tsat 13% - on ferrous sulfate 300mg qd via PEG.  Venofer held d/t leukocytosis.  SILVA w/ HD.  Transfuse for Hgb <7.  Monitor Hb.    CKD-MBD   - low for CKD.  PO4 low supplemented today.  Sevelamer 1600mg TID d/c'ed 5/21.  Monitor Ca, PO4 daily    Hypocalcemia:  In setting of CKD vs. hypoalbuminemia.  Optimize albumin.  Corrected Ca WNL.  Replete as needed.  Monitor Ca.    hyponatremia  better  monitor

## 2024-06-05 NOTE — PROGRESS NOTE ADULT - SUBJECTIVE AND OBJECTIVE BOX
VASCULAR SURGERY DAILY PROGRESS NOTE:     SUBJECTIVE/ROS:   Patient seen and evaluated on AM rounds.       OBJECTIVE:  Vital Signs Last 24 Hrs  T(C): 36.2 (05 Jun 2024 06:00), Max: 37.1 (04 Jun 2024 20:05)  T(F): 97.2 (05 Jun 2024 06:00), Max: 98.7 (04 Jun 2024 20:05)  HR: 53 (05 Jun 2024 08:15) (53 - 80)  BP: 124/50 (05 Jun 2024 06:00) (124/50 - 172/58)  BP(mean): 70 (05 Jun 2024 06:00) (70 - 90)  RR: 18 (05 Jun 2024 08:15) (12 - 20)  SpO2: 100% (05 Jun 2024 08:15) (97% - 100%)    Parameters below as of 05 Jun 2024 08:56  Patient On (Oxygen Delivery Method): tracheostomy collar      I&O's Detail    04 Jun 2024 07:01  -  05 Jun 2024 07:00  --------------------------------------------------------  IN:    Enteral Tube Flush: 200 mL    Nepro: 80 mL  Total IN: 280 mL    OUT:  Total OUT: 0 mL    Total NET: 280 mL        Daily Height in cm: 172.7 (04 Jun 2024 16:21)    Daily   MEDICATIONS  (STANDING):  albuterol    0.083% 2.5 milliGRAM(s) Nebulizer every 6 hours  amLODIPine   Tablet 10 milliGRAM(s) Oral daily  atorvastatin 20 milliGRAM(s) Oral at bedtime  carvedilol 25 milliGRAM(s) Oral every 12 hours  chlorhexidine 0.12% Liquid 15 milliLiter(s) Oral Mucosa every 12 hours  chlorhexidine 2% Cloths 1 Application(s) Topical <User Schedule>  dextrose 10% Bolus 125 milliLiter(s) IV Bolus once  dextrose 5%. 1000 milliLiter(s) (50 mL/Hr) IV Continuous <Continuous>  dextrose 5%. 1000 milliLiter(s) (100 mL/Hr) IV Continuous <Continuous>  dextrose 50% Injectable 12.5 Gram(s) IV Push once  dextrose 50% Injectable 25 Gram(s) IV Push once  epoetin reilly (EPOGEN) Injectable 00545 Unit(s) IV Push <User Schedule>  ferrous    sulfate Liquid 300 milliGRAM(s) Enteral Tube daily  glucagon  Injectable 1 milliGRAM(s) IntraMuscular once  heparin  Infusion. 900 Unit(s)/Hr (9 mL/Hr) IV Continuous <Continuous>  hydrALAZINE 100 milliGRAM(s) Oral three times a day  insulin lispro (ADMELOG) corrective regimen sliding scale   SubCutaneous every 6 hours  insulin NPH human recombinant 4 Unit(s) SubCutaneous every 6 hours  pantoprazole  Injectable 40 milliGRAM(s) IV Push every 12 hours    MEDICATIONS  (PRN):  acetaminophen   Oral Liquid .. 650 milliGRAM(s) Oral every 6 hours PRN Temp greater or equal to 38C (100.4F), Moderate Pain (4 - 6)  dextrose Oral Gel 15 Gram(s) Oral once PRN Blood Glucose LESS THAN 70 milliGRAM(s)/deciliter  hydrALAZINE Injectable 10 milliGRAM(s) IV Push every 8 hours PRN SBP > 180  melatonin 3 milliGRAM(s) Oral at bedtime PRN Insomnia  sodium chloride 0.9% lock flush 10 milliLiter(s) IV Push every 1 hour PRN Pre/post blood products, medications, blood draw, and to maintain line patency      Labs:                        7.3    10.02 )-----------( 414      ( 05 Jun 2024 01:30 )             23.1     06-05    133<L>  |  94<L>  |  49<H>  ----------------------------<  150<H>  4.3   |  25  |  5.15<H>    Ca    8.6      05 Jun 2024 01:30  Phos  4.8     06-05  Mg     2.40     06-05      PT/INR - ( 04 Jun 2024 02:21 )   PT: 11.9 sec;   INR: 1.06 ratio         PTT - ( 05 Jun 2024 01:30 )  PTT:34.0 sec  Urinalysis Basic - ( 05 Jun 2024 01:30 )    Color: x / Appearance: x / SG: x / pH: x  Gluc: 150 mg/dL / Ketone: x  / Bili: x / Urobili: x   Blood: x / Protein: x / Nitrite: x   Leuk Esterase: x / RBC: x / WBC x   Sq Epi: x / Non Sq Epi: x / Bacteria: x        Physical Exam:  General: NAD, resting comfortably in bed  HEENT: Normocephalic atraumatic  Respiratory: Nonlabored respirations  Extremities: RUE AVF w/ palpable thrill, no hematoma

## 2024-06-06 LAB
ANION GAP SERPL CALC-SCNC: 13 MMOL/L — SIGNIFICANT CHANGE UP (ref 7–14)
APTT BLD: 85.5 SEC — HIGH (ref 24.5–35.6)
BUN SERPL-MCNC: 27 MG/DL — HIGH (ref 7–23)
CALCIUM SERPL-MCNC: 8.8 MG/DL — SIGNIFICANT CHANGE UP (ref 8.4–10.5)
CHLORIDE SERPL-SCNC: 96 MMOL/L — LOW (ref 98–107)
CO2 SERPL-SCNC: 28 MMOL/L — SIGNIFICANT CHANGE UP (ref 22–31)
CREAT SERPL-MCNC: 3.55 MG/DL — HIGH (ref 0.5–1.3)
EGFR: 18 ML/MIN/1.73M2 — LOW
GLUCOSE BLDC GLUCOMTR-MCNC: 165 MG/DL — HIGH (ref 70–99)
GLUCOSE BLDC GLUCOMTR-MCNC: 176 MG/DL — HIGH (ref 70–99)
GLUCOSE BLDC GLUCOMTR-MCNC: 187 MG/DL — HIGH (ref 70–99)
GLUCOSE BLDC GLUCOMTR-MCNC: 199 MG/DL — HIGH (ref 70–99)
GLUCOSE SERPL-MCNC: 165 MG/DL — HIGH (ref 70–99)
GRAM STN FLD: ABNORMAL
HCT VFR BLD CALC: 23.4 % — LOW (ref 39–50)
HGB BLD-MCNC: 7.4 G/DL — LOW (ref 13–17)
MAGNESIUM SERPL-MCNC: 2.3 MG/DL — SIGNIFICANT CHANGE UP (ref 1.6–2.6)
MCHC RBC-ENTMCNC: 21.8 PG — LOW (ref 27–34)
MCHC RBC-ENTMCNC: 31.6 GM/DL — LOW (ref 32–36)
MCV RBC AUTO: 69 FL — LOW (ref 80–100)
NRBC # BLD: 0 /100 WBCS — SIGNIFICANT CHANGE UP (ref 0–0)
NRBC # FLD: 0.06 K/UL — HIGH (ref 0–0)
PHOSPHATE SERPL-MCNC: 2.7 MG/DL — SIGNIFICANT CHANGE UP (ref 2.5–4.5)
PLATELET # BLD AUTO: 440 K/UL — HIGH (ref 150–400)
POTASSIUM SERPL-MCNC: 3.5 MMOL/L — SIGNIFICANT CHANGE UP (ref 3.5–5.3)
POTASSIUM SERPL-SCNC: 3.5 MMOL/L — SIGNIFICANT CHANGE UP (ref 3.5–5.3)
RBC # BLD: 3.39 M/UL — LOW (ref 4.2–5.8)
RBC # FLD: 23.4 % — HIGH (ref 10.3–14.5)
SODIUM SERPL-SCNC: 137 MMOL/L — SIGNIFICANT CHANGE UP (ref 135–145)
SPECIMEN SOURCE: SIGNIFICANT CHANGE UP
WBC # BLD: 10.43 K/UL — SIGNIFICANT CHANGE UP (ref 3.8–10.5)
WBC # FLD AUTO: 10.43 K/UL — SIGNIFICANT CHANGE UP (ref 3.8–10.5)

## 2024-06-06 PROCEDURE — 99233 SBSQ HOSP IP/OBS HIGH 50: CPT

## 2024-06-06 PROCEDURE — 31502 CHANGE OF WINDPIPE AIRWAY: CPT

## 2024-06-06 RX ORDER — APIXABAN 2.5 MG/1
5 TABLET, FILM COATED ORAL ONCE
Refills: 0 | Status: COMPLETED | OUTPATIENT
Start: 2024-06-06 | End: 2024-06-06

## 2024-06-06 RX ORDER — LIDOCAINE HCL 20 MG/ML
1 VIAL (ML) INJECTION ONCE
Refills: 0 | Status: COMPLETED | OUTPATIENT
Start: 2024-06-06 | End: 2024-06-06

## 2024-06-06 RX ORDER — APIXABAN 2.5 MG/1
5 TABLET, FILM COATED ORAL EVERY 12 HOURS
Refills: 0 | Status: DISCONTINUED | OUTPATIENT
Start: 2024-06-06 | End: 2024-06-14

## 2024-06-06 RX ADMIN — AMLODIPINE BESYLATE 10 MILLIGRAM(S): 2.5 TABLET ORAL at 05:06

## 2024-06-06 RX ADMIN — CHLORHEXIDINE GLUCONATE 15 MILLILITER(S): 213 SOLUTION TOPICAL at 05:06

## 2024-06-06 RX ADMIN — Medication 100 MILLIGRAM(S): at 21:12

## 2024-06-06 RX ADMIN — ATORVASTATIN CALCIUM 20 MILLIGRAM(S): 80 TABLET, FILM COATED ORAL at 21:12

## 2024-06-06 RX ADMIN — Medication 300 MILLIGRAM(S): at 12:02

## 2024-06-06 RX ADMIN — PANTOPRAZOLE SODIUM 40 MILLIGRAM(S): 20 TABLET, DELAYED RELEASE ORAL at 17:39

## 2024-06-06 RX ADMIN — HEPARIN SODIUM 900 UNIT(S)/HR: 5000 INJECTION INTRAVENOUS; SUBCUTANEOUS at 07:32

## 2024-06-06 RX ADMIN — Medication 2: at 05:14

## 2024-06-06 RX ADMIN — CARVEDILOL PHOSPHATE 25 MILLIGRAM(S): 80 CAPSULE, EXTENDED RELEASE ORAL at 05:07

## 2024-06-06 RX ADMIN — HUMAN INSULIN 4 UNIT(S): 100 INJECTION, SUSPENSION SUBCUTANEOUS at 11:25

## 2024-06-06 RX ADMIN — Medication 2: at 17:42

## 2024-06-06 RX ADMIN — ALBUTEROL 2.5 MILLIGRAM(S): 90 AEROSOL, METERED ORAL at 21:47

## 2024-06-06 RX ADMIN — Medication 100 MILLIGRAM(S): at 05:05

## 2024-06-06 RX ADMIN — HUMAN INSULIN 4 UNIT(S): 100 INJECTION, SUSPENSION SUBCUTANEOUS at 17:42

## 2024-06-06 RX ADMIN — CARVEDILOL PHOSPHATE 25 MILLIGRAM(S): 80 CAPSULE, EXTENDED RELEASE ORAL at 17:38

## 2024-06-06 RX ADMIN — Medication 100 MILLIGRAM(S): at 12:02

## 2024-06-06 RX ADMIN — Medication 1 MILLILITER(S): at 10:52

## 2024-06-06 RX ADMIN — HUMAN INSULIN 4 UNIT(S): 100 INJECTION, SUSPENSION SUBCUTANEOUS at 05:15

## 2024-06-06 RX ADMIN — CHLORHEXIDINE GLUCONATE 1 APPLICATION(S): 213 SOLUTION TOPICAL at 05:06

## 2024-06-06 RX ADMIN — APIXABAN 5 MILLIGRAM(S): 2.5 TABLET, FILM COATED ORAL at 17:36

## 2024-06-06 RX ADMIN — PANTOPRAZOLE SODIUM 40 MILLIGRAM(S): 20 TABLET, DELAYED RELEASE ORAL at 05:06

## 2024-06-06 RX ADMIN — ALBUTEROL 2.5 MILLIGRAM(S): 90 AEROSOL, METERED ORAL at 04:16

## 2024-06-06 RX ADMIN — HEPARIN SODIUM 900 UNIT(S)/HR: 5000 INJECTION INTRAVENOUS; SUBCUTANEOUS at 06:58

## 2024-06-06 RX ADMIN — Medication 2: at 11:25

## 2024-06-06 RX ADMIN — APIXABAN 5 MILLIGRAM(S): 2.5 TABLET, FILM COATED ORAL at 12:02

## 2024-06-06 RX ADMIN — CHLORHEXIDINE GLUCONATE 15 MILLILITER(S): 213 SOLUTION TOPICAL at 17:38

## 2024-06-06 NOTE — OCCUPATIONAL THERAPY INITIAL EVALUATION ADULT - PERTINENT HX OF CURRENT PROBLEM, REHAB EVAL
73 year old male with history of ESRD on HD, HTN, and IDDM2 who presented with chest pain and admitted for NSTEMI vs demand ischemia. Baclofen started and course complicated by AMS second to baclofen toxicity requiring intubation and MICU transfer. Course further complicated by LEFT pontine and cerebellar stroke, right AVF stenosis, aspiration and B Cereus bacteremia and right LE DVT. S/p tracheostomy. Course complicated by intermittent fever spikes with likely aspiration PNA, AOCD, and possible GI bleed. S/p right UE fistulogram on 6/4/24.

## 2024-06-06 NOTE — PROGRESS NOTE ADULT - SUBJECTIVE AND OBJECTIVE BOX
VASCULAR SURGERY DAILY PROGRESS NOTE:     SUBJECTIVE/ROS:   Patient seen and evaluated on AM rounds.      OBJECTIVE:  Vital Signs Last 24 Hrs  T(C): 36.8 (2024 10:55), Max: 36.9 (2024 04:00)  T(F): 98.2 (2024 10:55), Max: 98.5 (2024 04:00)  HR: 60 (2024 12:00) (59 - 68)  BP: 150/59 (2024 12:00) (136/57 - 152/58)  BP(mean): 78 (2024 18:00) (78 - 78)  RR: 19 (2024 10:55) (18 - 20)  SpO2: 97% (2024 10:) (97% - 99%)    Parameters below as of 2024 10:55  Patient On (Oxygen Delivery Method): tracheostomy collar  O2 Flow (L/min): 7  O2 Concentration (%): 28  I&O's Detail    2024 07:01  -  2024 07:00  --------------------------------------------------------  IN:    Heparin Infusion: 108 mL    Nepro: 540 mL    Other (mL): 400 mL  Total IN: 1048 mL    OUT:    Other (mL): 2400 mL  Total OUT: 2400 mL    Total NET: -1352 mL      2024 07:01  -  2024 15:15  --------------------------------------------------------  IN:    Enteral Tube Flush: 100 mL  Total IN: 100 mL    OUT:  Total OUT: 0 mL    Total NET: 100 mL        Daily     Daily Weight in k.7 (2024 15:55)  MEDICATIONS  (STANDING):  albuterol    0.083% 2.5 milliGRAM(s) Nebulizer every 6 hours  amLODIPine   Tablet 10 milliGRAM(s) Oral daily  apixaban 5 milliGRAM(s) Enteral Tube every 12 hours  atorvastatin 20 milliGRAM(s) Oral at bedtime  carvedilol 25 milliGRAM(s) Oral every 12 hours  chlorhexidine 0.12% Liquid 15 milliLiter(s) Oral Mucosa every 12 hours  chlorhexidine 2% Cloths 1 Application(s) Topical <User Schedule>  dextrose 10% Bolus 125 milliLiter(s) IV Bolus once  dextrose 5%. 1000 milliLiter(s) (100 mL/Hr) IV Continuous <Continuous>  dextrose 5%. 1000 milliLiter(s) (50 mL/Hr) IV Continuous <Continuous>  dextrose 50% Injectable 12.5 Gram(s) IV Push once  dextrose 50% Injectable 25 Gram(s) IV Push once  epoetin reilly (EPOGEN) Injectable 11847 Unit(s) IV Push <User Schedule>  ferrous    sulfate Liquid 300 milliGRAM(s) Enteral Tube daily  glucagon  Injectable 1 milliGRAM(s) IntraMuscular once  hydrALAZINE 100 milliGRAM(s) Oral three times a day  insulin lispro (ADMELOG) corrective regimen sliding scale   SubCutaneous every 6 hours  insulin NPH human recombinant 4 Unit(s) SubCutaneous every 6 hours  pantoprazole  Injectable 40 milliGRAM(s) IV Push every 12 hours    MEDICATIONS  (PRN):  acetaminophen   Oral Liquid .. 650 milliGRAM(s) Oral every 6 hours PRN Temp greater or equal to 38C (100.4F), Moderate Pain (4 - 6)  dextrose Oral Gel 15 Gram(s) Oral once PRN Blood Glucose LESS THAN 70 milliGRAM(s)/deciliter  hydrALAZINE Injectable 10 milliGRAM(s) IV Push every 8 hours PRN SBP > 180  melatonin 3 milliGRAM(s) Oral at bedtime PRN Insomnia  sodium chloride 0.9% lock flush 10 milliLiter(s) IV Push every 1 hour PRN Pre/post blood products, medications, blood draw, and to maintain line patency      Labs:                        7.4    10.43 )-----------( 440      ( 2024 06:10 )             23.4     06-06    137  |  96<L>  |  27<H>  ----------------------------<  165<H>  3.5   |  28  |  3.55<H>    Ca    8.8      2024 06:10  Phos  2.7     -  Mg     2.30     -      PTT - ( 2024 06:10 )  PTT:85.5 sec  Urinalysis Basic - ( 2024 06:10 )    Color: x / Appearance: x / SG: x / pH: x  Gluc: 165 mg/dL / Ketone: x  / Bili: x / Urobili: x   Blood: x / Protein: x / Nitrite: x   Leuk Esterase: x / RBC: x / WBC x   Sq Epi: x / Non Sq Epi: x / Bacteria: x        Physical Exam:  General: NAD, resting comfortably in bed  HEENT: Normocephalic atraumatic  Respiratory: Nonlabored respirations  Extremities: RUE AVF w/ palpable thrill, no hematoma

## 2024-06-06 NOTE — PROGRESS NOTE ADULT - SUBJECTIVE AND OBJECTIVE BOX
Patient is a 73y old  Male who presents with a chief complaint of Unstable angina, abn EKG (06 Jun 2024 15:14)      SUBJECTIVE / OVERNIGHT EVENTS: R IJ shiley removed, success HD accessing RUE AVF on 6/5    MEDICATIONS  (STANDING):  albuterol    0.083% 2.5 milliGRAM(s) Nebulizer every 6 hours  amLODIPine   Tablet 10 milliGRAM(s) Oral daily  apixaban 5 milliGRAM(s) Enteral Tube every 12 hours  atorvastatin 20 milliGRAM(s) Oral at bedtime  carvedilol 25 milliGRAM(s) Oral every 12 hours  chlorhexidine 0.12% Liquid 15 milliLiter(s) Oral Mucosa every 12 hours  chlorhexidine 2% Cloths 1 Application(s) Topical <User Schedule>  dextrose 10% Bolus 125 milliLiter(s) IV Bolus once  dextrose 5%. 1000 milliLiter(s) (50 mL/Hr) IV Continuous <Continuous>  dextrose 5%. 1000 milliLiter(s) (100 mL/Hr) IV Continuous <Continuous>  dextrose 50% Injectable 12.5 Gram(s) IV Push once  dextrose 50% Injectable 25 Gram(s) IV Push once  epoetin reilly (EPOGEN) Injectable 18955 Unit(s) IV Push <User Schedule>  ferrous    sulfate Liquid 300 milliGRAM(s) Enteral Tube daily  glucagon  Injectable 1 milliGRAM(s) IntraMuscular once  hydrALAZINE 100 milliGRAM(s) Oral three times a day  insulin lispro (ADMELOG) corrective regimen sliding scale   SubCutaneous every 6 hours  insulin NPH human recombinant 4 Unit(s) SubCutaneous every 6 hours  pantoprazole  Injectable 40 milliGRAM(s) IV Push every 12 hours    MEDICATIONS  (PRN):  acetaminophen   Oral Liquid .. 650 milliGRAM(s) Oral every 6 hours PRN Temp greater or equal to 38C (100.4F), Moderate Pain (4 - 6)  dextrose Oral Gel 15 Gram(s) Oral once PRN Blood Glucose LESS THAN 70 milliGRAM(s)/deciliter  hydrALAZINE Injectable 10 milliGRAM(s) IV Push every 8 hours PRN SBP > 180  melatonin 3 milliGRAM(s) Oral at bedtime PRN Insomnia  sodium chloride 0.9% lock flush 10 milliLiter(s) IV Push every 1 hour PRN Pre/post blood products, medications, blood draw, and to maintain line patency      Vital Signs Last 24 Hrs  T(F): 98.1 (06-06-24 @ 20:00), Max: 98.6 (06-06-24 @ 17:34)  HR: 70 (06-06-24 @ 23:17) (60 - 70)  BP: 161/59 (06-06-24 @ 20:00) (144/58 - 161/59)  RR: 19 (06-06-24 @ 20:00) (18 - 20)  SpO2: 97% (06-06-24 @ 23:17) (96% - 99%)  Telemetry:   CAPILLARY BLOOD GLUCOSE      POCT Blood Glucose.: 176 mg/dL (06 Jun 2024 23:31)  POCT Blood Glucose.: 165 mg/dL (06 Jun 2024 17:16)  POCT Blood Glucose.: 199 mg/dL (06 Jun 2024 11:12)  POCT Blood Glucose.: 187 mg/dL (06 Jun 2024 05:09)    I&O's Summary    05 Jun 2024 07:01  -  06 Jun 2024 07:00  --------------------------------------------------------  IN: 1048 mL / OUT: 2400 mL / NET: -1352 mL    06 Jun 2024 07:01  -  06 Jun 2024 23:34  --------------------------------------------------------  IN: 740 mL / OUT: 0 mL / NET: 740 mL        PHYSICAL EXAM:  GENERAL: NAD, well-developed  HEAD:  Atraumatic, Normocephalic  EYES: EOMI, PERRLA, conjunctiva and sclera clear  NECK: Supple, No JVD  CHEST/LUNG: Clear to auscultation bilaterally; No wheeze  HEART: Regular rate and rhythm; No murmurs, rubs, or gallops  ABDOMEN: Soft, Nontender, Nondistended; Bowel sounds present  EXTREMITIES:  2+ Peripheral Pulses, No clubbing, cyanosis, or edema  PSYCH: AAOx3  NEUROLOGY: non-focal  SKIN: No rashes or lesions    LABS:                        7.4    10.43 )-----------( 440      ( 06 Jun 2024 06:10 )             23.4     06-06    137  |  96<L>  |  27<H>  ----------------------------<  165<H>  3.5   |  28  |  3.55<H>    Ca    8.8      06 Jun 2024 06:10  Phos  2.7     06-06  Mg     2.30     06-06      PTT - ( 06 Jun 2024 06:10 )  PTT:85.5 sec      Urinalysis Basic - ( 06 Jun 2024 06:10 )    Color: x / Appearance: x / SG: x / pH: x  Gluc: 165 mg/dL / Ketone: x  / Bili: x / Urobili: x   Blood: x / Protein: x / Nitrite: x   Leuk Esterase: x / RBC: x / WBC x   Sq Epi: x / Non Sq Epi: x / Bacteria: x        RADIOLOGY & ADDITIONAL TESTS:    Imaging Personally Reviewed:    Consultant(s) Notes Reviewed:      Care Discussed with Consultants/Other Providers:

## 2024-06-06 NOTE — PROGRESS NOTE ADULT - ASSESSMENT
ASSESSMENT   72 YO M with PMHx of ESRD on HD MWF, HTN, and 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 Baclofen started and course complicated by AMS second to baclofen toxicity requiring intubation and MICU transfer. Course further complicated by LEFT pontine and cerebellar stroke, R AVF stenosis, aspiration and B Cereus bacteremia and RLE DVT. s/p trach and transferred to RCU 5/16 and course complicated by intermittent fever spikes with likely aspiration citrobacter PNA, AOCD, and GIB vs Fe stools.     PLAN  NEUROLOGY  # AMS   - MRI HEAD (5/6) with punctate foci in the left talya and left cerebellum with concern for acute infarct.   - MRA HEAD and NECK (5/6) with no large vessel occlusion or stenosis.  - Hold DAPT for now pending procedures   - Continue on Lipitor for medical management     PSYCH   # Depression   - Concern for depression with questionable SI   - CO started, however formal discussion and evaluation with no true SI   - Supportive care and encouragement QD    CARDIOVASCULAR  # CP with NSTEMI < demand ischemia with HTN   - Admitted for CP and HTN with peaked T WAVES and ECG with ST deviation on admission   - Troponins mildly elevated on admission with negative CKMB   - TTE (4/30) with EF 72, normal LVRVSF, and no regional wall motion abnormalities   ** Type II myocardial infarction 2/2 distributive shock earlier on admission.  - TTE (4/30) with EF 72, normal LVRVSF, and no regional wall motion abnormalities    - Cardiology following - No need to pursue cath at this time given debility and chronic respiratory failure s/p trach 5/14.     # Septic vs vasoplegic shock  # Hx of HTN   - Home BP medications held and pressors weaned off   - Continue on coreg 25 (increased 6/1), hydralazine 100 TID and Norvasc 10mg (increased 5/31)   - Monitor blood pressures with Hydralazine 10mg IVP Q8H PRN   - IF remains hypertensive then add Isordil     RESPIRATORY  # Respiratory failure   - AMS noted with baclofen toxicity requiring intubation   - Attempted extubation in MICU however course complicated by aspiration and prolonged course and now s/p tracheostomy with IP on 5/14  - Continue on vent 12/500/5/30 and weaned to TC ATC   - Continue on Albuterol with TC ATC (5/30 - )   - Able to tolerate PMV during the day with good phonation     GI  # Dysphagia   - s/p surgical PEG 5/17   - Failed green dye on 6/1 and continue on tube feeds via PEG   - Continue with PMV during the day this week and reattempt SS sometime next week.     # GIB  - Noted with several episodes of black stool with drop in Hgb (5/26 overnight) requiring PRBC with appropriate response  - Case discussed with GI, questionable anemia from AOCD with ESRD and black stool likely from iron, and no plan for scope at this time  - Continue on PPI and monitor stools    RENAL  # ESRD on HD MWF with R BCF  # Fistula stenosis   - VA AVF (5/2) with Right radiocephalic arteriovenous fistula has no hemodynamically significant stenosis present at the anastomotic site ( cm/sec, EDV 49 cm/sec). The usable segment is partially thrombosed with minimal patency.  - Required R FEMORAL line on medicine, then removed on 5/2, and replaced with R IJ SHILEY on 5/3 for CRRT then converted back to iHD MWF   - R IJ SHILEY removed on 5/10 second to bacteremia and replaced on 5/13  - R IJ SHILEY removed 5/27 given intermittent fevers    - Blood cultures negative and replaced SHILEY on 6/1   - Vein mapping with no adequate veins in UE for conduit (all <2 mm and/or thrombosed) and pending possible revision of RUE AVF.   - s/p RUE Fistulogram on 6/4 - site can be accessed for HD today  - Continue on HD MWF per Renal     # Hyperphosphatemia   - Continue on Renvela 1600 however phos corrects well with HD   - Monitor off Renvela     INFECTIOUS DISEASE  # Aspiration concern   - Recurrent fever spike and CXR with RLL opacity   - BCx (5/25 and 5/30) negative   - SCx (5/25) negative   - SCx (5/30) with citrobacter and completed Zosyn empirically (5/18 - 5/28)    # B Cereus Bacteremia   - Course complicated by fevers and aspiration event   - MRSA (5/1) negative   - BCx (5/2) negative   - SCx (5/4) and BAL (5/12) negative   - BCx (5/10) with bacillus cereus and cleared on (5/12).   - Case discussed with ID and s/p Vancomycin through 5/21 x 10 day course.   - Continue to monitor off antibiotics per ID     HEME  # AOCD with ESRD   - Anemia panel with AOCD and low % sat   - EPO 10K continued on TIW   - Ferrous supplement  - Transfused on 5/16 and 5/26 and will monitor HH     VASCULAR   # RLE DVT   - CT AP (5/12) with nonocclusive RIGHT common iliac vein deep venous thrombosis and case discussed with IR with no plans for IVC filter.   - Initially started on a Heparin GTT with course complicated by questionable GIB given dark tarry stools, however more likely second to iron tabs.   - Continue on heparin GTT and monitor    ENDOCRINE  # IDDM2 A1C 6.9  - c/w NPH 4U Q6 and ISS  - Monitor and adjust insulin based on FS     SKIN  - R FEMORAL (5/1-5/2)   - R IJ SHILEY (5/3-5/10)   - R IJ SHILEY (5/13 - 5/27)   - R IJ SHILEY (6/1 - )     ETHICS/ GOC    - FULL CODE     DISPO - PMR recc ACUTE   ASSESSMENT   74 YO M with PMHx of ESRD on HD MWF, HTN, and 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 Baclofen started and course complicated by AMS second to baclofen toxicity requiring intubation and MICU transfer. Course further complicated by LEFT pontine and cerebellar stroke, R AVF stenosis, aspiration and B Cereus bacteremia and RLE DVT. s/p trach and transferred to RCU 5/16 and course complicated by intermittent fever spikes with likely aspiration citrobacter PNA, AOCD, and GIB vs Fe stools.     PLAN  NEUROLOGY  # AMS   - MRI HEAD (5/6) with punctate foci in the left talya and left cerebellum with concern for acute infarct.   - MRA HEAD and NECK (5/6) with no large vessel occlusion or stenosis.  - DAPT held iso procedure 6/4, will restart   - Continue on Lipitor for medical management     PSYCH   # Depression   - Concern for depression with questionable SI   - CO started, however formal discussion and evaluation with no true SI   - Supportive care and encouragement QD    CARDIOVASCULAR  # CP with NSTEMI < demand ischemia with HTN   - Admitted for CP and HTN with peaked T WAVES and ECG with ST deviation on admission   - Troponins mildly elevated on admission with negative CKMB   - TTE (4/30) with EF 72, normal LVRVSF, and no regional wall motion abnormalities   ** Type II myocardial infarction 2/2 distributive shock earlier on admission.  - Cardiology following - No need to pursue cath at this time given debility and chronic respiratory failure s/p trach 5/14.     # Septic vs vasoplegic shock  # Hx of HTN   - Home BP medications held and pressors weaned off   - Continue on coreg 25 (increased 6/1), hydralazine 100 TID and Norvasc 10mg (increased 5/31)   - Monitor blood pressures with Hydralazine 10mg IVP Q8H PRN   - IF remains hypertensive then add Isordil     RESPIRATORY  # Respiratory failure   - AMS noted with baclofen toxicity requiring intubation   - Attempted extubation in MICU however course complicated by aspiration and prolonged course and now s/p tracheostomy with IP on 5/14  - Continued on vent 12/500/5/30 and weaned to TC ATC   - Continue on Albuterol with TC ATC (5/30 - )   - Able to tolerate PMV during the day with good phonation     GI  # Dysphagia   - s/p surgical PEG 5/17   - Failed green dye on 6/1 and continue on tube feeds via PEG   - Continue with PMV during the day this week and reattempt SS sometime next week.     # GIB  - Noted with several episodes of black stool with drop in Hgb (5/26 overnight) requiring PRBC with appropriate response  - Case discussed with GI, questionable anemia from AOCD with ESRD and black stool likely from iron, and no plan for scope at this time  - Continue on PPI and monitor stools    RENAL  # ESRD on HD MWF with R BCF  # Fistula stenosis   - VA AVF (5/2) with Right radiocephalic arteriovenous fistula has no hemodynamically significant stenosis present at the anastomotic site ( cm/sec, EDV 49 cm/sec). The usable segment is partially thrombosed with minimal patency.  - Required R FEMORAL line on medicine, then removed on 5/2, and replaced with R IJ SHILEY on 5/3 for CRRT then converted back to iHD MWF   - R IJ SHILEY removed on 5/10 second to bacteremia and replaced on 5/13  - R IJ SHILEY removed 5/27 given intermittent fevers    - Blood cultures negative and replaced SHILEY on 6/1   - Vein mapping with no adequate veins in UE for conduit (all <2 mm and/or thrombosed) and pending possible revision of RUE AVF.   - s/p RUE Fistulogram on 6/4 - successfully accessed for HD 6/5  - Remove RIJ Shiley   - Continue on HD MWF per Renal     # Hyperphosphatemia   - Renvela 1600 however phos corrects well with HD   - Monitor off Renvela     INFECTIOUS DISEASE  # Aspiration concern   - Recurrent fever spike and CXR with RLL opacity   - BCx (5/25 and 5/30) negative   - SCx (5/25) negative   - SCx (5/30) with citrobacter and completed Zosyn empirically (5/18 - 5/28)    # B Cereus Bacteremia   - Course complicated by fevers and aspiration event   - MRSA (5/1) negative   - BCx (5/2) negative   - SCx (5/4) and BAL (5/12) negative   - BCx (5/10) with bacillus cereus and cleared on (5/12).   - Case discussed with ID and s/p Vancomycin through 5/21 x 10 day course.   - Continue to monitor off antibiotics per ID     HEME  # AOCD with ESRD   - Anemia panel with AOCD and low % sat   - EPO 10K continued on TIW   - Ferrous supplement  - Transfused on 5/16 and 5/26 and will monitor HH     VASCULAR   # RLE DVT   - CT AP (5/12) with nonocclusive RIGHT common iliac vein deep venous thrombosis and case discussed with IR with no plans for IVC filter.   - RLE duplex 5/28 with no evidence of DVT  - Initially started on a Heparin GTT with course complicated by questionable GIB given dark tarry stools, however more likely second to iron tabs.   - Continue on heparin GTT and monitor    ENDOCRINE  # IDDM2 A1C 6.9  - c/w NPH 4U Q6 and ISS  - Monitor and adjust insulin based on FS     SKIN  - R FEMORAL (5/1-5/2)   - R IJ SHILEY (5/3-5/10)   - R IJ SHILEY (5/13 - 5/27)   - R IJ SHILEY (6/1 - )     ETHICS/ GOC    - FULL CODE     DISPO - PMR recc ACUTE   ASSESSMENT   74 YO M with PMHx of ESRD on HD MWF, HTN, and 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 Baclofen started and course complicated by AMS second to baclofen toxicity requiring intubation and MICU transfer. Course further complicated by LEFT pontine and cerebellar stroke, R AVF stenosis, aspiration and B Cereus bacteremia and RLE DVT. s/p trach and transferred to RCU 5/16 and course complicated by intermittent fever spikes with likely aspiration citrobacter PNA, AOCD, and GIB vs Fe stools.     PLAN  NEUROLOGY  # AMS   - MRI HEAD (5/6) with punctate foci in the left talya and left cerebellum with concern for acute infarct.   - MRA HEAD and NECK (5/6) with no large vessel occlusion or stenosis.  - Continue on Lipitor for medical management     PSYCH   # Depression   - Concern for depression with questionable SI   - CO started, however formal discussion and evaluation with no true SI   - Supportive care and encouragement QD    CARDIOVASCULAR  # CP with NSTEMI < demand ischemia with HTN   - Admitted for CP and HTN with peaked T WAVES and ECG with ST deviation on admission   - Troponins mildly elevated on admission with negative CKMB   - TTE (4/30) with EF 72, normal LVRVSF, and no regional wall motion abnormalities   ** Type II myocardial infarction 2/2 distributive shock earlier on admission.  - Cardiology following - No need to pursue cath at this time given debility and chronic respiratory failure s/p trach 5/14.     # Septic vs vasoplegic shock  # Hx of HTN   - Home BP medications held and pressors weaned off   - Continue on coreg 25 (increased 6/1), hydralazine 100 TID and Norvasc 10mg (increased 5/31)   - Monitor blood pressures with Hydralazine 10mg IVP Q8H PRN   - IF remains hypertensive then add Isordil     RESPIRATORY  # Respiratory failure   - AMS noted with baclofen toxicity requiring intubation   - Attempted extubation in MICU however course complicated by aspiration and prolonged course and now s/p tracheostomy with IP on 5/14  - Continued on vent 12/500/5/30 and weaned to TC ATC   - Continue on Albuterol with TC ATC (5/30 - )   - Able to tolerate PMV during the day with good phonation     GI  # Dysphagia   - s/p surgical PEG 5/17   - Failed green dye on 6/1 and continue on tube feeds via PEG   - Continue with PMV during the day this week and reattempt SS sometime next week.     # GIB  - Noted with several episodes of black stool with drop in Hgb (5/26 overnight) requiring PRBC with appropriate response  - Case discussed with GI, questionable anemia from AOCD with ESRD and black stool likely from iron, and no plan for scope at this time  - Continue on PPI and monitor stools    RENAL  # ESRD on HD MWF with R BCF  # Fistula stenosis   - VA AVF (5/2) with Right radiocephalic arteriovenous fistula has no hemodynamically significant stenosis present at the anastomotic site ( cm/sec, EDV 49 cm/sec). The usable segment is partially thrombosed with minimal patency.  - Required R FEMORAL line on medicine, then removed on 5/2, and replaced with R IJ SHILEY on 5/3 for CRRT then converted back to iHD MWF   - R IJ SHILEY removed on 5/10 second to bacteremia and replaced on 5/13  - R IJ SHILEY removed 5/27 given intermittent fevers    - Blood cultures negative and replaced SHILEY on 6/1   - Vein mapping with no adequate veins in UE for conduit (all <2 mm and/or thrombosed) and pending possible revision of RUE AVF.   - s/p RUE Fistulogram on 6/4 - successfully accessed for HD 6/5  - Will remove RIJ Shiley   - Continue on HD MWF per Renal     # Hyperphosphatemia   - Renvela 1600 however phos corrects well with HD   - Monitor off Renvela     INFECTIOUS DISEASE  # Aspiration concern   - Recurrent fever spike and CXR with RLL opacity   - BCx (5/25 and 5/30) negative   - SCx (5/25) negative   - SCx (5/30) with citrobacter and completed Zosyn empirically (5/18 - 5/28)  - Thick secretions 6/6, follow up sputum cx    # B Cereus Bacteremia   - Course complicated by fevers and aspiration event   - MRSA (5/1) negative   - BCx (5/2) negative   - SCx (5/4) and BAL (5/12) negative   - BCx (5/10) with bacillus cereus and cleared on (5/12).   - Case discussed with ID and s/p Vancomycin through 5/21 x 10 day course.   - Continue to monitor off antibiotics per ID     HEME  # AOCD with ESRD   - Anemia panel with AOCD and low % sat   - EPO 10K continued on TIW   - Ferrous supplement  - Transfused on 5/16 and 5/26 and will monitor HH     VASCULAR   # RLE DVT   - CT AP (5/12) with nonocclusive RIGHT common iliac vein deep venous thrombosis and case discussed with IR with no plans for IVC filter.   - RLE duplex 5/28 with no evidence of DVT  - Initially started on a Heparin GTT with course complicated by questionable GIB given dark tarry stools, however more likely second to iron tabs.   - Continue on heparin GTT and monitor    ENDOCRINE  # IDDM2 A1C 6.9  - c/w NPH 4U Q6 and ISS  - Monitor and adjust insulin based on FS     SKIN  - R FEMORAL (5/1-5/2)   - R IJ SHILEY (5/3-5/10)   - R IJ SHILEY (5/13 - 5/27)   - R IJ SHILEY (6/1 - 6/6)     ETHICS/ GOC    - FULL CODE     DISPO - PMR recc ACUTE   ASSESSMENT   74 YO M with PMHx of ESRD on HD MWF, HTN, and 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 Baclofen started and course complicated by AMS second to baclofen toxicity requiring intubation and MICU transfer. Course further complicated by LEFT pontine and cerebellar stroke, R AVF stenosis, aspiration and B Cereus bacteremia and RLE DVT. s/p trach and transferred to RCU 5/16 and course complicated by intermittent fever spikes with likely aspiration citrobacter PNA, AOCD, and GIB vs Fe stools.     PLAN  NEUROLOGY  # AMS   - MRI HEAD (5/6) with punctate foci in the left talya and left cerebellum with concern for acute infarct.   - MRA HEAD and NECK (5/6) with no large vessel occlusion or stenosis.  - Continue on Lipitor for medical management     PSYCH   # Depression   - Concern for depression with questionable SI   - CO started, however formal discussion and evaluation with no true SI   - Supportive care and encouragement QD    CARDIOVASCULAR  # CP with NSTEMI < demand ischemia with HTN   - Admitted for CP and HTN with peaked T WAVES and ECG with ST deviation on admission   - Troponins mildly elevated on admission with negative CKMB   - TTE (4/30) with EF 72, normal LVRVSF, and no regional wall motion abnormalities   ** Type II myocardial infarction 2/2 distributive shock earlier on admission.  - Cardiology following - No need to pursue cath at this time given debility and chronic respiratory failure s/p trach 5/14.     # Septic vs vasoplegic shock  # Hx of HTN   - Home BP medications held and pressors weaned off   - Continue on coreg 25 (increased 6/1), hydralazine 100 TID and Norvasc 10mg (increased 5/31)   - Monitor blood pressures with Hydralazine 10mg IVP Q8H PRN   - IF remains hypertensive then add Isordil     RESPIRATORY  # Respiratory failure   - AMS noted with baclofen toxicity requiring intubation   - Attempted extubation in MICU however course complicated by aspiration and prolonged course and now s/p tracheostomy with IP on 5/14  - Continued on vent 12/500/5/30 and weaned to TC ATC   - Continue on Albuterol with TC ATC (5/30 - )   - Able to tolerate PMV during the day with good phonation     GI  # Dysphagia   - s/p surgical PEG 5/17   - Failed green dye on 6/1 and continue on tube feeds via PEG   - Continue with PMV during the day this week and reattempt SS sometime next week.     # GIB  - Noted with several episodes of black stool with drop in Hgb (5/26 overnight) requiring PRBC with appropriate response  - Case discussed with GI, questionable anemia from AOCD with ESRD and black stool likely from iron, and no plan for scope at this time  - Continue on PPI and monitor stools    RENAL  # ESRD on HD MWF with R BCF  # Fistula stenosis   - VA AVF (5/2) with Right radiocephalic arteriovenous fistula has no hemodynamically significant stenosis present at the anastomotic site ( cm/sec, EDV 49 cm/sec). The usable segment is partially thrombosed with minimal patency.  - Required R FEMORAL line on medicine, then removed on 5/2, and replaced with R IJ SHILEY on 5/3 for CRRT then converted back to iHD MWF   - R IJ SHILEY removed on 5/10 second to bacteremia and replaced on 5/13  - R IJ SHILEY removed 5/27 given intermittent fevers    - Blood cultures negative and replaced SHILEY on 6/1   - Vein mapping with no adequate veins in UE for conduit (all <2 mm and/or thrombosed) and pending possible revision of RUE AVF.   - s/p RUE Fistulogram on 6/4 - successfully accessed for HD 6/5  - Removed RIJ Shiley 6/6  - Continue on HD MWF per Renal     # Hyperphosphatemia   - Renvela 1600 however phos corrects well with HD   - Monitor off Renvela     INFECTIOUS DISEASE  # Aspiration concern   - Recurrent fever spike and CXR with RLL opacity   - BCx (5/25 and 5/30) negative   - SCx (5/25) negative   - SCx (5/30) with citrobacter and completed Zosyn empirically (5/18 - 5/28)  - Thick secretions 6/6, follow up sputum cx    # B Cereus Bacteremia   - Course complicated by fevers and aspiration event   - MRSA (5/1) negative   - BCx (5/2) negative   - SCx (5/4) and BAL (5/12) negative   - BCx (5/10) with bacillus cereus and cleared on (5/12).   - Case discussed with ID and s/p Vancomycin through 5/21 x 10 day course.   - Continue to monitor off antibiotics per ID     HEME  # AOCD with ESRD   - Anemia panel with AOCD and low % sat   - EPO 10K continued on TIW   - Ferrous supplement  - Transfused on 5/16 and 5/26 and will monitor HH     VASCULAR   # RLE DVT   - CT AP (5/12) with nonocclusive RIGHT common iliac vein deep venous thrombosis and case discussed with IR with no plans for IVC filter.   - RLE duplex 5/28 with no evidence of DVT  - Initially started on a Heparin GTT with course complicated by questionable GIB given dark tarry stools, however more likely second to iron tabs.   - Discontinued heparin GTT 6/6 and transitioned to Eliquis  - Continue 5 mg Eliquis BID, dose discussed with pharmacy given ESRD     ENDOCRINE  # IDDM2 A1C 6.9  - c/w NPH 4U Q6 and ISS  - Monitor and adjust insulin based on FS     SKIN  - R FEMORAL (5/1-5/2)   - R IJ SHILEY (5/3-5/10)   - R IJ SHILEY (5/13 - 5/27)   - R IJ SHILEY (6/1 - 6/6)     ETHICS/ GOC    - FULL CODE     DISPO - PMR recc ACUTE

## 2024-06-06 NOTE — CHART NOTE - NSCHARTNOTEFT_GEN_A_CORE
Pt tolerating HD via AVF - vascular requesting removal of shiley   Shiley catheter dc from RIJ  Pressure applied - no bleeding   DSD applied over site

## 2024-06-06 NOTE — PROGRESS NOTE ADULT - SUBJECTIVE AND OBJECTIVE BOX
Patient is a 73y Male     Patient is a 73y old  Male who presents with a chief complaint of Unstable angina, abn EKG (05 Jun 2024 22:18)      HPI:  73-year-old male past medical history hypertension, ESRD on dialysis Monday Wednesday Friday, diabetes insulin-dependent presents with hiccups patient endorses persistent hiccups for the last 2 days. found to have peaked T waves, a new finding. denies dizziness, N/V, denies CP, palps, abd pain (29 Apr 2024 21:15)      PAST MEDICAL & SURGICAL HISTORY:  Diabetes      Benign essential HTN      HLD (hyperlipidemia)      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      ESRD on hemodialysis      Arteriovenous fistula          MEDICATIONS  (STANDING):  albuterol    0.083% 2.5 milliGRAM(s) Nebulizer every 6 hours  amLODIPine   Tablet 10 milliGRAM(s) Oral daily  atorvastatin 20 milliGRAM(s) Oral at bedtime  carvedilol 25 milliGRAM(s) Oral every 12 hours  chlorhexidine 0.12% Liquid 15 milliLiter(s) Oral Mucosa every 12 hours  chlorhexidine 2% Cloths 1 Application(s) Topical <User Schedule>  dextrose 10% Bolus 125 milliLiter(s) IV Bolus once  dextrose 5%. 1000 milliLiter(s) (100 mL/Hr) IV Continuous <Continuous>  dextrose 5%. 1000 milliLiter(s) (50 mL/Hr) IV Continuous <Continuous>  dextrose 50% Injectable 12.5 Gram(s) IV Push once  dextrose 50% Injectable 25 Gram(s) IV Push once  epoetin reilly (EPOGEN) Injectable 41578 Unit(s) IV Push <User Schedule>  ferrous    sulfate Liquid 300 milliGRAM(s) Enteral Tube daily  glucagon  Injectable 1 milliGRAM(s) IntraMuscular once  heparin  Infusion. 900 Unit(s)/Hr (9 mL/Hr) IV Continuous <Continuous>  hydrALAZINE 100 milliGRAM(s) Oral three times a day  insulin lispro (ADMELOG) corrective regimen sliding scale   SubCutaneous every 6 hours  insulin NPH human recombinant 4 Unit(s) SubCutaneous every 6 hours  pantoprazole  Injectable 40 milliGRAM(s) IV Push every 12 hours      Allergies    No Known Allergies    Intolerances        SOCIAL HISTORY:  Denies ETOh,Smoking,     FAMILY HISTORY:  FHx: diabetes mellitus (Father, Aunt)        REVIEW OF SYSTEMS:    CONSTITUTIONAL: No weakness, fevers or chills  EYES/ENT: No visual changes;  No vertigo or throat pain   NECK: No pain or stiffness  RESPIRATORY: No cough, wheezing, hemoptysis; No shortness of breath  CARDIOVASCULAR: No chest pain or palpitations  GASTROINTESTINAL: No abdominal or epigastric pain. No nausea, vomiting, or hematemesis; No diarrhea or constipation. No melena or hematochezia.  GENITOURINARY: No dysuria, frequency or hematuria  NEUROLOGICAL: No numbness or weakness  SKIN: No itching, burning, rashes, or lesions   All other review of systems is negative unless indicated above.    VITAL:  T(C): , Max: 36.9 (06-06-24 @ 04:00)  T(F): , Max: 98.5 (06-06-24 @ 04:00)  HR: 63 (06-06-24 @ 04:14)  BP: 149/70 (06-06-24 @ 04:00)  BP(mean): 78 (06-05-24 @ 18:00)  RR: 20 (06-06-24 @ 04:00)  SpO2: 99% (06-06-24 @ 04:14)  Wt(kg): --    I and O's:    06-04 @ 07:01  -  06-05 @ 07:00  --------------------------------------------------------  IN: 280 mL / OUT: 0 mL / NET: 280 mL    06-05 @ 07:01  -  06-06 @ 07:00  --------------------------------------------------------  IN: 1048 mL / OUT: 2400 mL / NET: -1352 mL          PHYSICAL EXAM:    Constitutional: NAD  HEENT: PERRLA,   Neck: No JVD  Respiratory: CTA B/L  Cardiovascular: S1 and S2  Gastrointestinal: BS+, soft, NT/ND  Extremities: No peripheral edema  Neurological: A/O x 3, no focal deficits  Psychiatric: Normal mood, normal affect  : No Abbasi  Skin: No rashes  Access: Not applicable  Back: No CVA tenderness    LABS:                        7.0    8.37  )-----------( 379      ( 05 Jun 2024 19:41 )             22.2     06-05    133<L>  |  94<L>  |  44<H>  ----------------------------<  131<H>  3.8   |  25  |  4.69<H>    Ca    8.4      05 Jun 2024 12:55  Phos  4.3     06-05  Mg     2.30     06-05            RADIOLOGY & ADDITIONAL STUDIES:

## 2024-06-06 NOTE — OCCUPATIONAL THERAPY INITIAL EVALUATION ADULT - MD ORDER
Occupational therapy to evaluate and treat. Occupational therapy to evaluate and treat.  Activity increase as tolerated.

## 2024-06-06 NOTE — PROCEDURE NOTE - PROCEDURE DATE TIME, MLM
14-May-2024 16:22
31-May-2024 18:26
03-May-2024 21:50
06-Jun-2024
12-May-2024 14:03
12-May-2024 14:01
01-May-2024 23:22
03-May-2024 13:30
02-Jun-2024 18:17
04-May-2024 17:39
04-May-2024 18:06
13-May-2024 12:38
03-May-2024 21:30

## 2024-06-06 NOTE — PROGRESS NOTE ADULT - ASSESSMENT
74 yo Mw/ PMHx hypertension, ESRD on HD via R radiocephalic fistula (MWF), DM, HTN, p/w hiccups and peaked T waves, admitted to MICU for c/f baclofen toxicity. Patient received 1x HD on admission. R femoral shiley removed 5/2, had 2 RRT for AMS and failed HD via AVF 5/3. S/p emergent R IJ shiley placement 5/3, started CRRT which is now transitioned back to iHD. Vascular planning RUE fistulogram when medically optimized. Extubated to HFNC then reintubated 5/12 for c/f aspiration w/ hypoxic resp failure. S/p trach 5/14. Downgraded from MICU to RCU 5/16.    Recommendations:   - S/p RUE fistulogram, angioplasty of outflow stenosis on 6/4, recovering appropriately   - Successful use of AVF for HD on 6/5  - R IJ shiley removed by primary team 6/6   - Continue HD via AVF  - Please have patient f/u with Dr. Lockett   - Global care per primary   - Please call back with questions or concerns       Vascular Surgery  [id number]

## 2024-06-06 NOTE — PROGRESS NOTE ADULT - SUBJECTIVE AND OBJECTIVE BOX
[facility]   MD ELIANE BHAGAT MD ANGELA WONG, PA    TEL:  OFFICE: [phone]  From 5pm-7am Answering Service [phone]    -- RENAL FOLLOW UP NOTE ---Date of Service 06-06-24 @ 12:06    Patient is a 73y old  Male who presents with a chief complaint of Unstable angina, abn EKG (06 Jun 2024 09:05)      Patient seen and examined at bedside. No chest pain/sob    VITALS:  T(F): 98.2 (06-06-24 @ 10:55), Max: 98.5 (06-06-24 @ 04:00)  HR: 61 (06-06-24 @ 10:55)  BP: 144/58 (06-06-24 @ 10:55)  RR: 19 (06-06-24 @ 10:55)  SpO2: 97% (06-06-24 @ 10:55)  Wt(kg): --    06-05 @ 07:01  -  06-06 @ 07:00  --------------------------------------------------------  IN: 1048 mL / OUT: 2400 mL / NET: -1352 mL          PHYSICAL EXAM:  General: NAD  Neck: +trach  Respiratory: CTAB, no wheezes, rales or rhonchi  Cardiovascular: S1, S2, RRR  Gastrointestinal: +peg  Extremities: No peripheral edema    Hospital Medications:   MEDICATIONS  (STANDING):  albuterol    0.083% 2.5 milliGRAM(s) Nebulizer every 6 hours  amLODIPine   Tablet 10 milliGRAM(s) Oral daily  apixaban 5 milliGRAM(s) Enteral Tube every 12 hours  atorvastatin 20 milliGRAM(s) Oral at bedtime  carvedilol 25 milliGRAM(s) Oral every 12 hours  chlorhexidine 0.12% Liquid 15 milliLiter(s) Oral Mucosa every 12 hours  chlorhexidine 2% Cloths 1 Application(s) Topical <User Schedule>  dextrose 10% Bolus 125 milliLiter(s) IV Bolus once  dextrose 5%. 1000 milliLiter(s) (50 mL/Hr) IV Continuous <Continuous>  dextrose 5%. 1000 milliLiter(s) (100 mL/Hr) IV Continuous <Continuous>  dextrose 50% Injectable 12.5 Gram(s) IV Push once  dextrose 50% Injectable 25 Gram(s) IV Push once  epoetin reilly (EPOGEN) Injectable 38741 Unit(s) IV Push <User Schedule>  ferrous    sulfate Liquid 300 milliGRAM(s) Enteral Tube daily  glucagon  Injectable 1 milliGRAM(s) IntraMuscular once  hydrALAZINE 100 milliGRAM(s) Oral three times a day  insulin lispro (ADMELOG) corrective regimen sliding scale   SubCutaneous every 6 hours  insulin NPH human recombinant 4 Unit(s) SubCutaneous every 6 hours  pantoprazole  Injectable 40 milliGRAM(s) IV Push every 12 hours      LABS:  06-06    137  |  96<L>  |  27<H>  ----------------------------<  165<H>  3.5   |  28  |  3.55<H>    Ca    8.8      06 Jun 2024 06:10  Phos  2.7     06-06  Mg     2.30     06-06      Creatinine Trend: 3.55 <--, 4.69 <--, 5.15 <--, 4.13 <--, 6.00 <--, 5.26 <--, 4.19 <--, 6.99 <--    Phosphorus: 2.7 mg/dL (06-06 @ 06:10)  Phosphorus: 4.3 mg/dL (06-05 @ 12:55)                              7.4    10.43 )-----------( 440      ( 06 Jun 2024 06:10 )             23.4     Urine Studies:  Urinalysis - [06-06-24 @ 06:10]      Color  / Appearance  / SG  / pH       Gluc 165 / Ketone   / Bili  / Urobili        Blood  / Protein  / Leuk Est  / Nitrite       RBC  / WBC  / Hyaline  / Gran  / Sq Epi  / Non Sq Epi  / Bacteria       Iron 16, TIBC 121, %sat 13      [05-17-24 @ 06:00]  Ferritin 720      [05-17-24 @ 06:00]  PTH -- (Ca --)      [05-26-24 @ 01:20]   122  TSH 2.60      [05-17-24 @ 06:00]  Lipid: chol 86, , HDL 27, LDL --      [05-17-24 @ 06:00]    HBsAb <3.0      [06-03-24 @ 16:20]  HBsAg Nonreact      [06-03-24 @ 16:20]  HBcAb Nonreact      [06-03-24 @ 16:20]  HCV 0.09, Nonreact      [06-03-24 @ 16:20]      RADIOLOGY & ADDITIONAL STUDIES:

## 2024-06-06 NOTE — PROGRESS NOTE ADULT - ASSESSMENT
72 y/o M PMhx HTN, ESRD (on HD M/W/F), DM who presented with hiccups x 2 days and chest pain found to have peaked T waves. Hospital course c/b AMS s/p RRT on 5/1 and 5/2. Vascular consulted 2/2 RUE AVF access unable to be used s/p R femoral shiley placed for emergent HD. Transferred to MICU and intubated 5/2 for airway protection.   Hospital course c/b CVA s/p trach/ PEG, B. cereus bacteremia s/p vanc, completed 5/20, PNA s/p zosyn x 7 days, completed 5/27    AMS, CVA  TTE- no evidence of valvular disease  s/p LP 5/2, viral encephalitis/ meningitis ruled out  MRI- Punctate foci of restricted diffusion in the left talya and left cerebellum with associated T2 prolongation consistent with acute infarcts  s/p trach 5/14, s/p PEG 5/17    DVT  CT 5/12- Nonocclusive R common iliac vein deep venous thrombosis.  US LE 5/28- no evidence of DVT    ESRD  s/p RUE AV Fistulogram with balloon angioplasty 6/4  patient due for hepatitis B vaccination, defer to nephrology for arrangements which can be done at dialysis center    Recommendations  clinically stable off antibiotics  monitor off antibiotics  resp of care per RICU    We will sign off. Thank you for allowing us to participate in the care of Mr. Art. Please feel free to call with any questions or concerns.     Steven Torres M.D.  [facility], Division of Infectious Diseases  [phone]  After 5pm on weekdays and all day on weekends - please call [phone]

## 2024-06-06 NOTE — PROCEDURE NOTE - NSINFORMCONSENT_GEN_A_CORE
Acute hypoxic respiratory failure requiring intubation/This was an emergent procedure.
Benefits, risks, and possible complications of procedure explained to patient/caregiver who verbalized understanding and gave written consent.
Benefits, risks, and possible complications of procedure explained to patient/caregiver who verbalized understanding and gave verbal consent.
This was an emergent procedure.
Benefits, risks, and possible complications of procedure explained to patient/caregiver who verbalized understanding and gave verbal consent.
Benefits, risks, and possible complications of procedure explained to patient/caregiver who verbalized understanding and gave verbal consent.
This was an emergent procedure.
Benefits, risks, and possible complications of procedure explained to patient/caregiver who verbalized understanding and gave written consent.
This was an emergent procedure.
This was an emergent procedure.
Benefits, risks, and possible complications of procedure explained to patient/caregiver who verbalized understanding and gave written consent.
This was an emergent procedure.
Benefits, risks, and possible complications of procedure explained to patient/caregiver who verbalized understanding and gave written consent.

## 2024-06-06 NOTE — OCCUPATIONAL THERAPY INITIAL EVALUATION ADULT - ADDITIONAL COMMENTS
Social history limited due to patient minimally participatory during evaluation. Reports living in a house with his family. Reports being independent with ADLs and functional mobility.

## 2024-06-06 NOTE — OCCUPATIONAL THERAPY INITIAL EVALUATION ADULT - GENERAL OBSERVATIONS, REHAB EVAL
Patient received semisupine in bed in NAD; agreeable to participate in OT evaluation. +trach collar. +telemetry monitor. +IV. HR 60 bpm.

## 2024-06-06 NOTE — PROCEDURE NOTE - ATTENDING PROVIDER
Dr. Bonnie Bhakta
Dr. Lisker
Dr. Beckman
Dr. Clifford
Yony
Dr. Beckman
Dr. Lockett
Murn
Dr. Clifford

## 2024-06-06 NOTE — OCCUPATIONAL THERAPY INITIAL EVALUATION ADULT - PRECAUTIONS/LIMITATIONS, REHAB EVAL
on trach collar/aspiration precautions/cardiac precautions/fall precautions/oxygen therapy device and L/min

## 2024-06-06 NOTE — PROGRESS NOTE ADULT - ASSESSMENT
73-year-old male past medical history hypertension, ESRD on dialysis Monday Wednesday Friday, diabetes insulin-dependent presents with hiccups patient endorses persistent hiccups for the last 2 days.   found to have peaked T waves, a new finding. denied dizziness, N/V, denies CP, palps, abd pain  Upon admission seen by [facility], renal and GI  found to have a distended GB prob 2/2 gastroparesis, was started on Reglan  has received 4 doses of baclofen since 4/30 2/2 hiccups, last dose on 5/1 at 5 am  had received Haldol for agitation at 11 pm on 4/30, AMS observed in pm of 5/1  AMS ongoing, RRT x 2 called  now transferred to MICU for encephalopathy requiring  airway protection on 5/2,  intubated for airway protection, was on  propofol and pressors. now off  toxicology consulted upon dx of encephalopathy, not impressed AMS 2/2 Haldol nor baclofen . AMS was 2/2 acute CVA   HD was not done timely until femoral shiley was placed 2/2 clotted RUE AVF. now removed and RIJ shiley placed on 5/3  has been nonverbal since pm 5/1.     MR brain 5/6 c/w L pontine and L cerebellar acute infarcts, no hemorrhage . as per neuro: embolic in nature.   encephalopathy probably 2/2 acute CVA, now follows commands appropriately off sedation.   no SZ focus on EEG,   off CRRT,  HD resumed as per renal.   remains intubated, now trached  off keppra  AC resumed, was initially on  Heparin drip for R common iliac DVT, now held 2/2 GI bleed  completed 5 days of empiric ZOSYN on 5/7, shortly after became septic again after an extubation trial. bacteremia w B. cereus, on Vanc through 5/20 as per ID    s/p trache placement by pulm,  PEG placed by surgery 5/17, tolerating feeds  HD via R IJ.  Tmax overnight( 5/18)  101, cxr c/w RLL PNA, now on Zosyn, blood Cx s sent. remains on Vanco for B. cereus bacteremia   leukocytosis resolved  EKG changes on 5/3 c/w NSTEMI loaded w DAPT , was  on Heparin drip x 48 hrs. rpt TTE is unchanged  ID, Neuro , renal, cardiology following.  clotted RUE AVF.  fistulogram was cancelled on 5/25 2/2 fever  HD via Shiley, replaced 6/1  LP done, no sign of meningitis.   NEUROLOGY     - CTH without acute findings   - LP done, no acute findings  - MR brain w acute infarcts: L talya and L cerebellum, nonhemorrhagic  - no Sz focus on EEG    CARDIOVASCULAR   - ptn never had CP. just peaked T waves. was awaiting ischemic study w nucl stress test  - TTE showing EF 72%, rpt unchanged  - EKG changes on 5/3, loaded w DAPT  - BP improved.  on Norvasc, Coreg and hydralazine, max doses. as per renal add Imdur to improve BP    GI  - PEG placed, npo w tube feeds  - Gastroparesis       RENAL   -  HD as per renal  - R IJ shiley removed 6/6, successful HD via AVF on 6/5  - s/p balloon angioplasty of RUE AVF stenosis on 6/4. AVF used for HD on 6/5      INFECTIOUS DISEASE     completed a course of Zosyn, then became septic, bacteremic a B. cereus, completed 3 days of Meropenem, completed vanc through 5/21  on Zosyn since 5/18 for RLL PNA, afebrile, completed 5/23  recurrent fever 5/25, ZOsyn resumed and stopped on 5/28. afebrile since    ENDOCRINE   - DM  - cw ISS    DVT  - RLE, on Heparin drip,     GOC:  Full code  Dispo: acute rehab

## 2024-06-06 NOTE — PROGRESS NOTE ADULT - ASSESSMENT
73-year-old male past medical history hypertension, ESRD on dialysis Monday Wednesday Friday, diabetes insulin-dependent presents with hiccups patient endorses persistent hiccups for the last 2 days. Nephrology consulted for HD needs.    A/P  ESRD:  Center: [facility]  Nephrologist: Dr. Hardwick  Access: R AVF nonfunction now s/p thrombectomy 6/4. hd via AVF working well 6/5  hd tmr  shiley can be removed   Consent obtained and placed in ED chart  Renal diet  Monitor BMP  Consider [facility] for rehab where his outpatient Nephrologist, Dr. Hardwick can follow him    Hyperkalemia/Hypokalemia  Improved w/ HD.  C/W HD schedule as above.  Lokelma 10gm PRN for K >5.3 on non-HD days  K stable.  Low K diet.  Monitor closely.    HTN:  BP fluctuating but acceptable   On carvedilol 12.5mg BID, hydralazine 100mg TID.  on norvasc   UF w/ HD as BP permits.  Monitor BP.    Anemia:  Hgb low.  + iron deficiency.  Tsat 13% - on ferrous sulfate 300mg qd via PEG.  Venofer held d/t leukocytosis.  SILVA w/ HD.  Transfuse for Hgb <7.  Monitor Hb.    CKD-MBD   - low for CKD.  PO4 low supplemented today.  Sevelamer 1600mg TID d/c'ed 5/21.  Monitor Ca, PO4 daily    Hypocalcemia:  In setting of CKD vs. hypoalbuminemia.  Optimize albumin.  Corrected Ca WNL.  Replete as needed.  Monitor Ca.    hyponatremia  better  monitor

## 2024-06-06 NOTE — PROCEDURE NOTE - NSPROCNAME_GEN_A_CORE
Interventional Radiology
Tracheal Intubation
Bronchoscopy
Central Line Insertion
Arterial Puncture/Cannulation
Peripheral Line Insertion
General
Bronchoscopy
Lumbar Puncture
Central Line Insertion
Tracheal Intubation
Central Line Insertion
Central Line Insertion

## 2024-06-06 NOTE — OCCUPATIONAL THERAPY INITIAL EVALUATION ADULT - DIAGNOSIS, OT EVAL
s/p tracheostomy, s/p NSTEMI, s/p aspiration PNA; decreased functional mobility, decreased ADL performance

## 2024-06-06 NOTE — PROGRESS NOTE ADULT - SUBJECTIVE AND OBJECTIVE BOX
Patient is a 73y old  Male who presents with a chief complaint of Unstable angina, abn EKG (06 Jun 2024 07:10)    Interval Events:    REVIEW OF SYSTEMS:  [ ] Positive  [X] All other systems negative  [ ] Unable to assess ROS because ________    Vital Signs Last 24 Hrs  T(C): 36.9 (06-06-24 @ 04:00), Max: 36.9 (06-06-24 @ 04:00)  T(F): 98.5 (06-06-24 @ 04:00), Max: 98.5 (06-06-24 @ 04:00)  HR: 60 (06-06-24 @ 07:28) (53 - 68)  BP: 149/70 (06-06-24 @ 04:00) (124/48 - 152/58)  RR: 20 (06-06-24 @ 04:00) (17 - 20)  SpO2: 99% (06-06-24 @ 07:28) (97% - 100%)    PHYSICAL EXAM:  HEENT:   [ ]Tracheostomy:  [ ]Pupils equal  [ ]No oral lesions  [ ]Abnormal    SKIN  [ ]No Rash  [ ] Abnormal  [ ] pressure    CARDIAC  [ ]Regular  [ ]Abnormal    PULMONARY  [ ]Bilateral Clear Breath Sounds  [ ]Normal Excursion  [ ]Abnormal    GI  [ ]PEG      [ ] +BS		              [ ]Soft, nondistended, nontender	  [ ]Abnormal    MUSCULOSKELETAL                                   [ ]Bedbound                 [ ]Abnormal    [ ]Ambulatory/OOB to chair                           EXTREMITIES                                         [ ]Normal  [ ]Edema                           NEUROLOGIC  [ ] Normal, non focal  [ ] Focal findings:    PSYCHIATRIC  [ ]Alert and appropriate  [ ] Sedated	 [ ]Agitated    :  Elroy: [ ] Yes, if yes: Date of Placement:                   [  ] No    LINES: Central Lines [ ] Yes, if yes: Date of Placement                                     [  ] No    HOSPITAL MEDICATIONS:  MEDICATIONS  (STANDING):  albuterol    0.083% 2.5 milliGRAM(s) Nebulizer every 6 hours  amLODIPine   Tablet 10 milliGRAM(s) Oral daily  atorvastatin 20 milliGRAM(s) Oral at bedtime  carvedilol 25 milliGRAM(s) Oral every 12 hours  chlorhexidine 0.12% Liquid 15 milliLiter(s) Oral Mucosa every 12 hours  chlorhexidine 2% Cloths 1 Application(s) Topical <User Schedule>  dextrose 10% Bolus 125 milliLiter(s) IV Bolus once  dextrose 5%. 1000 milliLiter(s) (100 mL/Hr) IV Continuous <Continuous>  dextrose 5%. 1000 milliLiter(s) (50 mL/Hr) IV Continuous <Continuous>  dextrose 50% Injectable 12.5 Gram(s) IV Push once  dextrose 50% Injectable 25 Gram(s) IV Push once  epoetin reilly (EPOGEN) Injectable 84950 Unit(s) IV Push <User Schedule>  ferrous    sulfate Liquid 300 milliGRAM(s) Enteral Tube daily  glucagon  Injectable 1 milliGRAM(s) IntraMuscular once  heparin  Infusion. 900 Unit(s)/Hr (9 mL/Hr) IV Continuous <Continuous>  hydrALAZINE 100 milliGRAM(s) Oral three times a day  insulin lispro (ADMELOG) corrective regimen sliding scale   SubCutaneous every 6 hours  insulin NPH human recombinant 4 Unit(s) SubCutaneous every 6 hours  pantoprazole  Injectable 40 milliGRAM(s) IV Push every 12 hours    MEDICATIONS  (PRN):  acetaminophen   Oral Liquid .. 650 milliGRAM(s) Oral every 6 hours PRN Temp greater or equal to 38C (100.4F), Moderate Pain (4 - 6)  dextrose Oral Gel 15 Gram(s) Oral once PRN Blood Glucose LESS THAN 70 milliGRAM(s)/deciliter  hydrALAZINE Injectable 10 milliGRAM(s) IV Push every 8 hours PRN SBP > 180  melatonin 3 milliGRAM(s) Oral at bedtime PRN Insomnia  sodium chloride 0.9% lock flush 10 milliLiter(s) IV Push every 1 hour PRN Pre/post blood products, medications, blood draw, and to maintain line patency      LABS:                        7.4    10.43 )-----------( 440      ( 06 Jun 2024 06:10 )             23.4     06-05    133<L>  |  94<L>  |  44<H>  ----------------------------<  131<H>  3.8   |  25  |  4.69<H>    Ca    8.4      05 Jun 2024 12:55  Phos  4.3     06-05  Mg     2.30     06-05      PTT - ( 06 Jun 2024 06:10 )  PTT:85.5 sec  Urinalysis Basic - ( 05 Jun 2024 12:55 )    Color: x / Appearance: x / SG: x / pH: x  Gluc: 131 mg/dL / Ketone: x  / Bili: x / Urobili: x   Blood: x / Protein: x / Nitrite: x   Leuk Esterase: x / RBC: x / WBC x   Sq Epi: x / Non Sq Epi: x / Bacteria: x          CAPILLARY BLOOD GLUCOSE    MICROBIOLOGY:     RADIOLOGY:  [ ] Reviewed and interpreted by me    Mode: standby   Patient is a 73y old  Male who presents with a chief complaint of Unstable angina, abn EKG (06 Jun 2024 07:10)    Interval Events:    REVIEW OF SYSTEMS:  [ ] Positive  [X] All other systems negative  [ ] Unable to assess ROS because ________    Vital Signs Last 24 Hrs  T(C): 36.9 (06-06-24 @ 04:00), Max: 36.9 (06-06-24 @ 04:00)  T(F): 98.5 (06-06-24 @ 04:00), Max: 98.5 (06-06-24 @ 04:00)  HR: 60 (06-06-24 @ 07:28) (53 - 68)  BP: 149/70 (06-06-24 @ 04:00) (124/48 - 152/58)  RR: 20 (06-06-24 @ 04:00) (17 - 20)  SpO2: 99% (06-06-24 @ 07:28) (97% - 100%)    PHYSICAL EXAMINATION  General: NAD   HEENT: R IJ SHILEY and trach present   Cards: S1/S2, no murmurs   Pulm: CTA bilaterally. No wheezes.   Abdomen: Soft, nondistended and nontender. BS (+) PEG present.   Extremities: No pedal edema. THAI of BL upper and lower extremities with weakness. +palpable thrill over RUE fistula  MSK: No asymmetry no calf tenderness.   NEURO: non focal, following commands, awake and alert    :  Abbasi: [ ] Yes, if yes: Date of Placement:                   [ X ] No    LINES: Central Lines [ ] Yes, if yes: Date of Placement                                     [  ] No    HOSPITAL MEDICATIONS:  MEDICATIONS  (STANDING):  albuterol    0.083% 2.5 milliGRAM(s) Nebulizer every 6 hours  amLODIPine   Tablet 10 milliGRAM(s) Oral daily  atorvastatin 20 milliGRAM(s) Oral at bedtime  carvedilol 25 milliGRAM(s) Oral every 12 hours  chlorhexidine 0.12% Liquid 15 milliLiter(s) Oral Mucosa every 12 hours  chlorhexidine 2% Cloths 1 Application(s) Topical <User Schedule>  dextrose 10% Bolus 125 milliLiter(s) IV Bolus once  dextrose 5%. 1000 milliLiter(s) (100 mL/Hr) IV Continuous <Continuous>  dextrose 5%. 1000 milliLiter(s) (50 mL/Hr) IV Continuous <Continuous>  dextrose 50% Injectable 12.5 Gram(s) IV Push once  dextrose 50% Injectable 25 Gram(s) IV Push once  epoetin reilly (EPOGEN) Injectable 85159 Unit(s) IV Push <User Schedule>  ferrous    sulfate Liquid 300 milliGRAM(s) Enteral Tube daily  glucagon  Injectable 1 milliGRAM(s) IntraMuscular once  heparin  Infusion. 900 Unit(s)/Hr (9 mL/Hr) IV Continuous <Continuous>  hydrALAZINE 100 milliGRAM(s) Oral three times a day  insulin lispro (ADMELOG) corrective regimen sliding scale   SubCutaneous every 6 hours  insulin NPH human recombinant 4 Unit(s) SubCutaneous every 6 hours  pantoprazole  Injectable 40 milliGRAM(s) IV Push every 12 hours    MEDICATIONS  (PRN):  acetaminophen   Oral Liquid .. 650 milliGRAM(s) Oral every 6 hours PRN Temp greater or equal to 38C (100.4F), Moderate Pain (4 - 6)  dextrose Oral Gel 15 Gram(s) Oral once PRN Blood Glucose LESS THAN 70 milliGRAM(s)/deciliter  hydrALAZINE Injectable 10 milliGRAM(s) IV Push every 8 hours PRN SBP > 180  melatonin 3 milliGRAM(s) Oral at bedtime PRN Insomnia  sodium chloride 0.9% lock flush 10 milliLiter(s) IV Push every 1 hour PRN Pre/post blood products, medications, blood draw, and to maintain line patency      LABS:                        7.4    10.43 )-----------( 440      ( 06 Jun 2024 06:10 )             23.4     06-05    133<L>  |  94<L>  |  44<H>  ----------------------------<  131<H>  3.8   |  25  |  4.69<H>    Ca    8.4      05 Jun 2024 12:55  Phos  4.3     06-05  Mg     2.30     06-05      PTT - ( 06 Jun 2024 06:10 )  PTT:85.5 sec  Urinalysis Basic - ( 05 Jun 2024 12:55 )    Color: x / Appearance: x / SG: x / pH: x  Gluc: 131 mg/dL / Ketone: x  / Bili: x / Urobili: x   Blood: x / Protein: x / Nitrite: x   Leuk Esterase: x / RBC: x / WBC x   Sq Epi: x / Non Sq Epi: x / Bacteria: x          CAPILLARY BLOOD GLUCOSE    MICROBIOLOGY:     RADIOLOGY:  [X] Reviewed and interpreted by me    Mode: standby   Patient is a 73y old  Male who presents with a chief complaint of Unstable angina, abn EKG (06 Jun 2024 07:10)    Interval Events: Patient with thick secretions ON. Sputum culture sent. Patient remains hemodynamically stable.    REVIEW OF SYSTEMS:  [ ] Positive  [X] All other systems negative  [ ] Unable to assess ROS because ________    Vital Signs Last 24 Hrs  T(C): 36.9 (06-06-24 @ 04:00), Max: 36.9 (06-06-24 @ 04:00)  T(F): 98.5 (06-06-24 @ 04:00), Max: 98.5 (06-06-24 @ 04:00)  HR: 60 (06-06-24 @ 07:28) (53 - 68)  BP: 149/70 (06-06-24 @ 04:00) (124/48 - 152/58)  RR: 20 (06-06-24 @ 04:00) (17 - 20)  SpO2: 99% (06-06-24 @ 07:28) (97% - 100%)    PHYSICAL EXAMINATION  General: NAD   HEENT: R IJ SHILEY and trach present   Cards: S1/S2, no murmurs   Pulm: CTA bilaterally. No wheezes.   Abdomen: Soft, nondistended and nontender. BS (+) PEG present.   Extremities: No pedal edema. THAI of BL upper and lower extremities with weakness. +palpable thrill over RUE fistula  MSK: No asymmetry no calf tenderness.   NEURO: non focal, following commands, awake and alert    :  Abbasi: [ ] Yes, if yes: Date of Placement:                   [ X ] No    LINES: Central Lines [ X ] Yes, if yes: Date of Placement 6/1                                     [  ] No    HOSPITAL MEDICATIONS:  MEDICATIONS  (STANDING):  albuterol    0.083% 2.5 milliGRAM(s) Nebulizer every 6 hours  amLODIPine   Tablet 10 milliGRAM(s) Oral daily  atorvastatin 20 milliGRAM(s) Oral at bedtime  carvedilol 25 milliGRAM(s) Oral every 12 hours  chlorhexidine 0.12% Liquid 15 milliLiter(s) Oral Mucosa every 12 hours  chlorhexidine 2% Cloths 1 Application(s) Topical <User Schedule>  dextrose 10% Bolus 125 milliLiter(s) IV Bolus once  dextrose 5%. 1000 milliLiter(s) (100 mL/Hr) IV Continuous <Continuous>  dextrose 5%. 1000 milliLiter(s) (50 mL/Hr) IV Continuous <Continuous>  dextrose 50% Injectable 12.5 Gram(s) IV Push once  dextrose 50% Injectable 25 Gram(s) IV Push once  epoetin reilly (EPOGEN) Injectable 17646 Unit(s) IV Push <User Schedule>  ferrous    sulfate Liquid 300 milliGRAM(s) Enteral Tube daily  glucagon  Injectable 1 milliGRAM(s) IntraMuscular once  heparin  Infusion. 900 Unit(s)/Hr (9 mL/Hr) IV Continuous <Continuous>  hydrALAZINE 100 milliGRAM(s) Oral three times a day  insulin lispro (ADMELOG) corrective regimen sliding scale   SubCutaneous every 6 hours  insulin NPH human recombinant 4 Unit(s) SubCutaneous every 6 hours  pantoprazole  Injectable 40 milliGRAM(s) IV Push every 12 hours    MEDICATIONS  (PRN):  acetaminophen   Oral Liquid .. 650 milliGRAM(s) Oral every 6 hours PRN Temp greater or equal to 38C (100.4F), Moderate Pain (4 - 6)  dextrose Oral Gel 15 Gram(s) Oral once PRN Blood Glucose LESS THAN 70 milliGRAM(s)/deciliter  hydrALAZINE Injectable 10 milliGRAM(s) IV Push every 8 hours PRN SBP > 180  melatonin 3 milliGRAM(s) Oral at bedtime PRN Insomnia  sodium chloride 0.9% lock flush 10 milliLiter(s) IV Push every 1 hour PRN Pre/post blood products, medications, blood draw, and to maintain line patency      LABS:                        7.4    10.43 )-----------( 440      ( 06 Jun 2024 06:10 )             23.4     06-05    133<L>  |  94<L>  |  44<H>  ----------------------------<  131<H>  3.8   |  25  |  4.69<H>    Ca    8.4      05 Jun 2024 12:55  Phos  4.3     06-05  Mg     2.30     06-05      PTT - ( 06 Jun 2024 06:10 )  PTT:85.5 sec  Urinalysis Basic - ( 05 Jun 2024 12:55 )    Color: x / Appearance: x / SG: x / pH: x  Gluc: 131 mg/dL / Ketone: x  / Bili: x / Urobili: x   Blood: x / Protein: x / Nitrite: x   Leuk Esterase: x / RBC: x / WBC x   Sq Epi: x / Non Sq Epi: x / Bacteria: x          CAPILLARY BLOOD GLUCOSE    MICROBIOLOGY:     RADIOLOGY:  [X] Reviewed and interpreted by me    Mode: standby   Patient is a 73y old  Male who presents with a chief complaint of Unstable angina, abn EKG (06 Jun 2024 07:10)    Interval Events: Patient with thick secretions ON. Sputum culture sent. Patient remains hemodynamically stable.    REVIEW OF SYSTEMS:  [ ] Positive  [X] All other systems negative  [ ] Unable to assess ROS because ________    Vital Signs Last 24 Hrs  T(C): 36.9 (06-06-24 @ 04:00), Max: 36.9 (06-06-24 @ 04:00)  T(F): 98.5 (06-06-24 @ 04:00), Max: 98.5 (06-06-24 @ 04:00)  HR: 60 (06-06-24 @ 07:28) (53 - 68)  BP: 149/70 (06-06-24 @ 04:00) (124/48 - 152/58)  RR: 20 (06-06-24 @ 04:00) (17 - 20)  SpO2: 99% (06-06-24 @ 07:28) (97% - 100%)    PHYSICAL EXAMINATION  General: NAD   HEENT: R IJ SHILEY and trach present   Cards: S1/S2, no murmurs   Pulm: + rhonchi b/l to anterior lung fields. No wheezes or crackles.  Abdomen: Soft, nondistended and nontender. BS (+) PEG present.   Extremities: No pedal edema. THAI of BL upper and lower extremities with weakness. +palpable thrill over RUE fistula  MSK: No asymmetry no calf tenderness.   NEURO: non focal, following commands, awake and alert    :  Abbasi: [ ] Yes, if yes: Date of Placement:                   [ X ] No    LINES: Central Lines [ X ] Yes, if yes: Date of Placement 6/1                                     [  ] No    HOSPITAL MEDICATIONS:  MEDICATIONS  (STANDING):  albuterol    0.083% 2.5 milliGRAM(s) Nebulizer every 6 hours  amLODIPine   Tablet 10 milliGRAM(s) Oral daily  atorvastatin 20 milliGRAM(s) Oral at bedtime  carvedilol 25 milliGRAM(s) Oral every 12 hours  chlorhexidine 0.12% Liquid 15 milliLiter(s) Oral Mucosa every 12 hours  chlorhexidine 2% Cloths 1 Application(s) Topical <User Schedule>  dextrose 10% Bolus 125 milliLiter(s) IV Bolus once  dextrose 5%. 1000 milliLiter(s) (100 mL/Hr) IV Continuous <Continuous>  dextrose 5%. 1000 milliLiter(s) (50 mL/Hr) IV Continuous <Continuous>  dextrose 50% Injectable 12.5 Gram(s) IV Push once  dextrose 50% Injectable 25 Gram(s) IV Push once  epoetin reilly (EPOGEN) Injectable 84890 Unit(s) IV Push <User Schedule>  ferrous    sulfate Liquid 300 milliGRAM(s) Enteral Tube daily  glucagon  Injectable 1 milliGRAM(s) IntraMuscular once  heparin  Infusion. 900 Unit(s)/Hr (9 mL/Hr) IV Continuous <Continuous>  hydrALAZINE 100 milliGRAM(s) Oral three times a day  insulin lispro (ADMELOG) corrective regimen sliding scale   SubCutaneous every 6 hours  insulin NPH human recombinant 4 Unit(s) SubCutaneous every 6 hours  pantoprazole  Injectable 40 milliGRAM(s) IV Push every 12 hours    MEDICATIONS  (PRN):  acetaminophen   Oral Liquid .. 650 milliGRAM(s) Oral every 6 hours PRN Temp greater or equal to 38C (100.4F), Moderate Pain (4 - 6)  dextrose Oral Gel 15 Gram(s) Oral once PRN Blood Glucose LESS THAN 70 milliGRAM(s)/deciliter  hydrALAZINE Injectable 10 milliGRAM(s) IV Push every 8 hours PRN SBP > 180  melatonin 3 milliGRAM(s) Oral at bedtime PRN Insomnia  sodium chloride 0.9% lock flush 10 milliLiter(s) IV Push every 1 hour PRN Pre/post blood products, medications, blood draw, and to maintain line patency      LABS:                        7.4    10.43 )-----------( 440      ( 06 Jun 2024 06:10 )             23.4     06-05    133<L>  |  94<L>  |  44<H>  ----------------------------<  131<H>  3.8   |  25  |  4.69<H>    Ca    8.4      05 Jun 2024 12:55  Phos  4.3     06-05  Mg     2.30     06-05      PTT - ( 06 Jun 2024 06:10 )  PTT:85.5 sec  Urinalysis Basic - ( 05 Jun 2024 12:55 )    Color: x / Appearance: x / SG: x / pH: x  Gluc: 131 mg/dL / Ketone: x  / Bili: x / Urobili: x   Blood: x / Protein: x / Nitrite: x   Leuk Esterase: x / RBC: x / WBC x   Sq Epi: x / Non Sq Epi: x / Bacteria: x          CAPILLARY BLOOD GLUCOSE    MICROBIOLOGY:     RADIOLOGY:  [X] Reviewed and interpreted by me    Mode: standby   Patient is a 73y old  Male who presents with a chief complaint of Unstable angina, abn EKG (06 Jun 2024 07:10)    Interval Events: Patient with thick secretions ON. Sputum culture sent. Patient remains hemodynamically stable.    REVIEW OF SYSTEMS:  [ ] Positive  [X] All other systems negative  [ ] Unable to assess ROS because ________    Vital Signs Last 24 Hrs  T(C): 36.9 (06-06-24 @ 04:00), Max: 36.9 (06-06-24 @ 04:00)  T(F): 98.5 (06-06-24 @ 04:00), Max: 98.5 (06-06-24 @ 04:00)  HR: 60 (06-06-24 @ 07:28) (53 - 68)  BP: 149/70 (06-06-24 @ 04:00) (124/48 - 152/58)  RR: 20 (06-06-24 @ 04:00) (17 - 20)  SpO2: 99% (06-06-24 @ 07:28) (97% - 100%)    PHYSICAL EXAMINATION  General: NAD   HEENT: Trach present   Cards: S1/S2, no murmurs   Pulm: + rhonchi b/l to anterior lung fields. No wheezes or crackles.  Abdomen: Soft, nondistended and nontender. BS (+) PEG present.   Extremities: No pedal edema. THAI of BL upper and lower extremities with weakness. +palpable thrill over RUE fistula  MSK: No asymmetry no calf tenderness.   NEURO: non focal, following commands, awake and alert    :  Abbasi: [ ] Yes, if yes: Date of Placement:                   [ X ] No    LINES: Central Lines [  ] Yes, if yes: Date of Placement                                     [ X ] No    HOSPITAL MEDICATIONS:  MEDICATIONS  (STANDING):  albuterol    0.083% 2.5 milliGRAM(s) Nebulizer every 6 hours  amLODIPine   Tablet 10 milliGRAM(s) Oral daily  atorvastatin 20 milliGRAM(s) Oral at bedtime  carvedilol 25 milliGRAM(s) Oral every 12 hours  chlorhexidine 0.12% Liquid 15 milliLiter(s) Oral Mucosa every 12 hours  chlorhexidine 2% Cloths 1 Application(s) Topical <User Schedule>  dextrose 10% Bolus 125 milliLiter(s) IV Bolus once  dextrose 5%. 1000 milliLiter(s) (100 mL/Hr) IV Continuous <Continuous>  dextrose 5%. 1000 milliLiter(s) (50 mL/Hr) IV Continuous <Continuous>  dextrose 50% Injectable 12.5 Gram(s) IV Push once  dextrose 50% Injectable 25 Gram(s) IV Push once  epoetin reilly (EPOGEN) Injectable 31137 Unit(s) IV Push <User Schedule>  ferrous    sulfate Liquid 300 milliGRAM(s) Enteral Tube daily  glucagon  Injectable 1 milliGRAM(s) IntraMuscular once  heparin  Infusion. 900 Unit(s)/Hr (9 mL/Hr) IV Continuous <Continuous>  hydrALAZINE 100 milliGRAM(s) Oral three times a day  insulin lispro (ADMELOG) corrective regimen sliding scale   SubCutaneous every 6 hours  insulin NPH human recombinant 4 Unit(s) SubCutaneous every 6 hours  pantoprazole  Injectable 40 milliGRAM(s) IV Push every 12 hours    MEDICATIONS  (PRN):  acetaminophen   Oral Liquid .. 650 milliGRAM(s) Oral every 6 hours PRN Temp greater or equal to 38C (100.4F), Moderate Pain (4 - 6)  dextrose Oral Gel 15 Gram(s) Oral once PRN Blood Glucose LESS THAN 70 milliGRAM(s)/deciliter  hydrALAZINE Injectable 10 milliGRAM(s) IV Push every 8 hours PRN SBP > 180  melatonin 3 milliGRAM(s) Oral at bedtime PRN Insomnia  sodium chloride 0.9% lock flush 10 milliLiter(s) IV Push every 1 hour PRN Pre/post blood products, medications, blood draw, and to maintain line patency      LABS:                        7.4    10.43 )-----------( 440      ( 06 Jun 2024 06:10 )             23.4     06-05    133<L>  |  94<L>  |  44<H>  ----------------------------<  131<H>  3.8   |  25  |  4.69<H>    Ca    8.4      05 Jun 2024 12:55  Phos  4.3     06-05  Mg     2.30     06-05      PTT - ( 06 Jun 2024 06:10 )  PTT:85.5 sec  Urinalysis Basic - ( 05 Jun 2024 12:55 )    Color: x / Appearance: x / SG: x / pH: x  Gluc: 131 mg/dL / Ketone: x  / Bili: x / Urobili: x   Blood: x / Protein: x / Nitrite: x   Leuk Esterase: x / RBC: x / WBC x   Sq Epi: x / Non Sq Epi: x / Bacteria: x          CAPILLARY BLOOD GLUCOSE    MICROBIOLOGY:     RADIOLOGY:  [X] Reviewed and interpreted by me    Mode: standby

## 2024-06-06 NOTE — PROCEDURE NOTE - NSPOSTCAREGUIDE_GEN_A_CORE
Instructed patient/caregiver regarding signs and symptoms of infection
Care for catheter as per unit/ICU protocols
trach care as directed
Care for catheter as per unit/ICU protocols

## 2024-06-06 NOTE — PROGRESS NOTE ADULT - SUBJECTIVE AND OBJECTIVE BOX
Cardiovascular Disease Progress Note  DATE OF SERVICE: 24 @ 09:05    Overnight events: No acute events overnight.    The patient is in no distress on trach collar.   Otherwise review of systems negative    Objective Findings:  T(C): 36.9 (24 @ 04:00), Max: 36.9 (24 @ 04:00)  HR: 60 (24 @ 07:28) (53 - 68)  BP: 149/70 (24 @ 04:00) (136/57 - 152/58)  RR: 20 (24 @ 04:00) (17 - 20)  SpO2: 99% (24 @ 07:28) (97% - 99%)  Wt(kg): --  Daily     Daily Weight in k.7 (2024 15:55)      Physical Exam:  Gen: NAD; Patient resting comfortably  HEENT: EOMI, Normocephalic/ atraumatic  CV: RRR, normal S1 + S2, no m/r/g  Lungs:  Normal respiratory effort; clear to auscultation bilaterally  Abd: soft, non-tender; bowel sounds present  Ext: No edema; warm and well perfused    Telemetry: n/a    Laboratory Data:                        7.4    10.43 )-----------( 440      ( 2024 06:10 )             23.4     06-    137  |  96<L>  |  27<H>  ----------------------------<  165<H>  3.5   |  28  |  3.55<H>    Ca    8.8      2024 06:10  Phos  2.7     -  Mg     2.30     -      PTT - ( 2024 06:10 )  PTT:85.5 sec          Inpatient Medications:  MEDICATIONS  (STANDING):  albuterol    0.083% 2.5 milliGRAM(s) Nebulizer every 6 hours  amLODIPine   Tablet 10 milliGRAM(s) Oral daily  atorvastatin 20 milliGRAM(s) Oral at bedtime  carvedilol 25 milliGRAM(s) Oral every 12 hours  chlorhexidine 0.12% Liquid 15 milliLiter(s) Oral Mucosa every 12 hours  chlorhexidine 2% Cloths 1 Application(s) Topical <User Schedule>  dextrose 10% Bolus 125 milliLiter(s) IV Bolus once  dextrose 5%. 1000 milliLiter(s) (50 mL/Hr) IV Continuous <Continuous>  dextrose 5%. 1000 milliLiter(s) (100 mL/Hr) IV Continuous <Continuous>  dextrose 50% Injectable 12.5 Gram(s) IV Push once  dextrose 50% Injectable 25 Gram(s) IV Push once  epoetin reilly (EPOGEN) Injectable 51290 Unit(s) IV Push <User Schedule>  ferrous    sulfate Liquid 300 milliGRAM(s) Enteral Tube daily  glucagon  Injectable 1 milliGRAM(s) IntraMuscular once  heparin  Infusion. 900 Unit(s)/Hr (9 mL/Hr) IV Continuous <Continuous>  hydrALAZINE 100 milliGRAM(s) Oral three times a day  insulin lispro (ADMELOG) corrective regimen sliding scale   SubCutaneous every 6 hours  insulin NPH human recombinant 4 Unit(s) SubCutaneous every 6 hours  pantoprazole  Injectable 40 milliGRAM(s) IV Push every 12 hours      Assessment: 73 year old man with HTN, HLD, T2DM on insulin, and ESRD on HD presents with supply demand ischemia and angina.    Plan of Care:    #Type II myocardial infarction-  Secondary to distributive shock earlier on admission.   The repeat echo shows no new wall motion abnormality.   I would not pursue cath at this time given debility and chronic respiratory failure s/p trach .       #HTN-  Readings are much improved after amlodipine and Coreg were up titrated.       #ESRD-  HD as per renal     #DVT   - R common Iliac DVT  AC as per RCU.     #ACP (advance care planning)-  Advanced care planning was discussed with the patient.    Cardiac findings were discussed and all questions were answered.      Over 55 minutes spent on total encounter; more than 50% of the visit was spent counseling and/or coordinating care by the attending physician.      Geovani Bravo MD [facility]  Cardiovascular Disease  [phone]

## 2024-06-06 NOTE — PROGRESS NOTE ADULT - SUBJECTIVE AND OBJECTIVE BOX
[facility], Division of Infectious Diseases  AUGUST Carbajal Y. Patel, S. Shah, G. Brian  [phone]  ([phone] - weekdays after 5pm and weekends)    Name: JIMMY BLAND  Age/Gender: 73y Male  MRN: [id number]    Interval History:  Notes reviewed.   No concerning overnight events.  Afebrile.     Allergies: No Known Allergies      Objective:  Vitals:   T(F): 98.2 (06-06-24 @ 10:55), Max: 98.5 (06-06-24 @ 04:00)  HR: 60 (06-06-24 @ 12:00) (59 - 68)  BP: 150/59 (06-06-24 @ 12:00) (136/57 - 152/58)  RR: 19 (06-06-24 @ 10:55) (18 - 20)  SpO2: 97% (06-06-24 @ 10:55) (97% - 99%)  Physical Examination:  General: no acute distress  HEENT: tracheostomy  Heart: S1, S2, normal rate  Lungs: clear to auscultation anteriorly, on trach collar  Abdomen: Soft. ND. NT  Extremities: No LE edema.   Skin: Warm. Dry.     Laboratory Studies:  CBC:                       7.4    10.43 )-----------( 440      ( 06 Jun 2024 06:10 )             23.4     WBC Trend:  10.43 06-06-24 @ 06:10  8.37 06-05-24 @ 19:41  8.12 06-05-24 @ 12:55  10.02 06-05-24 @ 01:30  10.60 06-04-24 @ 02:21  11.20 06-03-24 @ 05:35  10.33 06-02-24 @ 23:15  9.43 06-02-24 @ 17:47  9.46 06-02-24 @ 06:00  11.03 06-01-24 @ 06:00  10.08 05-31-24 @ 05:10  10.31 05-30-24 @ 18:10    CMP: 06-06    137  |  96<L>  |  27<H>  ----------------------------<  165<H>  3.5   |  28  |  3.55<H>    Ca    8.8      06 Jun 2024 06:10  Phos  2.7     06-06  Mg     2.30     06-06            Urinalysis Basic - ( 06 Jun 2024 06:10 )    Color: x / Appearance: x / SG: x / pH: x  Gluc: 165 mg/dL / Ketone: x  / Bili: x / Urobili: x   Blood: x / Protein: x / Nitrite: x   Leuk Esterase: x / RBC: x / WBC x   Sq Epi: x / Non Sq Epi: x / Bacteria: x      Microbiology: reviewed     Culture - Sputum (collected 05-30-24 @ 11:44)  Source: .Sputum Sputum  Gram Stain (05-30-24 @ 19:45):    Moderate polymorphonuclear leukocytes per low power field    Few Squamous epithelial cells per low power field    Few Gram Negative Rods per oil power field  Final Report (06-01-24 @ 12:02):    Moderate Citrobacter koseri    Normal Respiratory Jocelyn present  Organism: Citrobacter koseri (06-01-24 @ 12:02)  Organism: Citrobacter koseri (06-01-24 @ 12:02)      Method Type: VIDA      -  Amoxicillin/Clavulanic Acid: S <=8/4      -  Ampicillin: R >16 These ampicillin results predict results for amoxicillin      -  Ampicillin/Sulbactam: S <=4/2      -  Aztreonam: S <=4      -  Cefazolin: S <=2      -  Cefepime: S <=2      -  Cefoxitin: S <=8      -  Ceftriaxone: S <=1      -  Ciprofloxacin: S <=0.25      -  Ertapenem: S <=0.5      -  Gentamicin: S <=2      -  Imipenem: S <=1      -  Levofloxacin: S <=0.5      -  Meropenem: S <=1      -  Piperacillin/Tazobactam: S <=8      -  Tobramycin: S <=2      -  Trimethoprim/Sulfamethoxazole: S <=0.5/9.5    Culture - Blood (collected 05-30-24 @ 09:03)  Source: .Blood Blood-Peripheral  Final Report (06-04-24 @ 15:00):    No growth at 5 days    Culture - Blood (collected 05-30-24 @ 08:48)  Source: .Blood Blood-Venous  Final Report (06-04-24 @ 15:00):    No growth at 5 days    Culture - Sputum (collected 05-25-24 @ 21:28)  Source: Trach Asp Tracheal Aspirate  Gram Stain (05-26-24 @ 13:57):    Few polymorphonuclear leukocytes per low power field    Rare Gram Negative Rods per oil power field  Final Report (05-27-24 @ 18:28):    Normal Respiratory Jocelyn present    Culture - Blood (collected 05-25-24 @ 19:00)  Source: .Blood Blood-Peripheral  Final Report (05-30-24 @ 22:01):    No growth at 5 days    Culture - Blood (collected 05-25-24 @ 18:45)  Source: .Blood Blood-Peripheral  Final Report (05-30-24 @ 22:01):    No growth at 5 days        Radiology: reviewed     Medications:  acetaminophen   Oral Liquid .. 650 milliGRAM(s) Oral every 6 hours PRN  albuterol    0.083% 2.5 milliGRAM(s) Nebulizer every 6 hours  amLODIPine   Tablet 10 milliGRAM(s) Oral daily  apixaban 5 milliGRAM(s) Enteral Tube every 12 hours  atorvastatin 20 milliGRAM(s) Oral at bedtime  carvedilol 25 milliGRAM(s) Oral every 12 hours  chlorhexidine 0.12% Liquid 15 milliLiter(s) Oral Mucosa every 12 hours  chlorhexidine 2% Cloths 1 Application(s) Topical <User Schedule>  dextrose 10% Bolus 125 milliLiter(s) IV Bolus once  dextrose 5%. 1000 milliLiter(s) IV Continuous <Continuous>  dextrose 5%. 1000 milliLiter(s) IV Continuous <Continuous>  dextrose 50% Injectable 25 Gram(s) IV Push once  dextrose 50% Injectable 12.5 Gram(s) IV Push once  dextrose Oral Gel 15 Gram(s) Oral once PRN  epoetin reilly (EPOGEN) Injectable 62873 Unit(s) IV Push <User Schedule>  ferrous    sulfate Liquid 300 milliGRAM(s) Enteral Tube daily  glucagon  Injectable 1 milliGRAM(s) IntraMuscular once  hydrALAZINE 100 milliGRAM(s) Oral three times a day  hydrALAZINE Injectable 10 milliGRAM(s) IV Push every 8 hours PRN  insulin lispro (ADMELOG) corrective regimen sliding scale   SubCutaneous every 6 hours  insulin NPH human recombinant 4 Unit(s) SubCutaneous every 6 hours  melatonin 3 milliGRAM(s) Oral at bedtime PRN  pantoprazole  Injectable 40 milliGRAM(s) IV Push every 12 hours  sodium chloride 0.9% lock flush 10 milliLiter(s) IV Push every 1 hour PRN    Antimicrobials:

## 2024-06-06 NOTE — PROGRESS NOTE ADULT - NS ATTEND AMEND GEN_ALL_CORE FT
agree with above  doing well, did well with HD  d/c kolby  trach changed to cuffless 6 kolby zamudio planning - acute rehab

## 2024-06-07 ENCOUNTER — TRANSCRIPTION ENCOUNTER (OUTPATIENT)
Age: 74
End: 2024-06-07

## 2024-06-07 LAB
ANION GAP SERPL CALC-SCNC: 14 MMOL/L — SIGNIFICANT CHANGE UP (ref 7–14)
APTT BLD: 30.3 SEC — SIGNIFICANT CHANGE UP (ref 24.5–35.6)
BUN SERPL-MCNC: 42 MG/DL — HIGH (ref 7–23)
CALCIUM SERPL-MCNC: 8.5 MG/DL — SIGNIFICANT CHANGE UP (ref 8.4–10.5)
CHLORIDE SERPL-SCNC: 97 MMOL/L — LOW (ref 98–107)
CO2 SERPL-SCNC: 26 MMOL/L — SIGNIFICANT CHANGE UP (ref 22–31)
CREAT SERPL-MCNC: 4.72 MG/DL — HIGH (ref 0.5–1.3)
EGFR: 12 ML/MIN/1.73M2 — LOW
GLUCOSE BLDC GLUCOMTR-MCNC: 116 MG/DL — HIGH (ref 70–99)
GLUCOSE BLDC GLUCOMTR-MCNC: 181 MG/DL — HIGH (ref 70–99)
GLUCOSE BLDC GLUCOMTR-MCNC: 188 MG/DL — HIGH (ref 70–99)
GLUCOSE BLDC GLUCOMTR-MCNC: 217 MG/DL — HIGH (ref 70–99)
GLUCOSE SERPL-MCNC: 170 MG/DL — HIGH (ref 70–99)
HCT VFR BLD CALC: 22.5 % — LOW (ref 39–50)
HGB BLD-MCNC: 7 G/DL — CRITICAL LOW (ref 13–17)
MAGNESIUM SERPL-MCNC: 2.3 MG/DL — SIGNIFICANT CHANGE UP (ref 1.6–2.6)
MCHC RBC-ENTMCNC: 21.5 PG — LOW (ref 27–34)
MCHC RBC-ENTMCNC: 31.1 GM/DL — LOW (ref 32–36)
MCV RBC AUTO: 69 FL — LOW (ref 80–100)
NRBC # BLD: 0 /100 WBCS — SIGNIFICANT CHANGE UP (ref 0–0)
NRBC # FLD: 0.06 K/UL — HIGH (ref 0–0)
PHOSPHATE SERPL-MCNC: 2 MG/DL — LOW (ref 2.5–4.5)
PLATELET # BLD AUTO: 404 K/UL — HIGH (ref 150–400)
POTASSIUM SERPL-MCNC: 3.7 MMOL/L — SIGNIFICANT CHANGE UP (ref 3.5–5.3)
POTASSIUM SERPL-SCNC: 3.7 MMOL/L — SIGNIFICANT CHANGE UP (ref 3.5–5.3)
RBC # BLD: 3.26 M/UL — LOW (ref 4.2–5.8)
RBC # FLD: 23.1 % — HIGH (ref 10.3–14.5)
SODIUM SERPL-SCNC: 137 MMOL/L — SIGNIFICANT CHANGE UP (ref 135–145)
WBC # BLD: 13.84 K/UL — HIGH (ref 3.8–10.5)
WBC # FLD AUTO: 13.84 K/UL — HIGH (ref 3.8–10.5)

## 2024-06-07 PROCEDURE — 99233 SBSQ HOSP IP/OBS HIGH 50: CPT

## 2024-06-07 RX ORDER — CIPROFLOXACIN LACTATE 400MG/40ML
400 VIAL (ML) INTRAVENOUS EVERY 24 HOURS
Refills: 0 | Status: COMPLETED | OUTPATIENT
Start: 2024-06-08 | End: 2024-06-12

## 2024-06-07 RX ORDER — SODIUM,POTASSIUM PHOSPHATES 278-250MG
1 POWDER IN PACKET (EA) ORAL
Refills: 0 | Status: COMPLETED | OUTPATIENT
Start: 2024-06-07 | End: 2024-06-07

## 2024-06-07 RX ORDER — CIPROFLOXACIN LACTATE 400MG/40ML
VIAL (ML) INTRAVENOUS
Refills: 0 | Status: COMPLETED | OUTPATIENT
Start: 2024-06-07 | End: 2024-06-13

## 2024-06-07 RX ORDER — CIPROFLOXACIN LACTATE 400MG/40ML
VIAL (ML) INTRAVENOUS
Refills: 0 | Status: DISCONTINUED | OUTPATIENT
Start: 2024-06-07 | End: 2024-06-07

## 2024-06-07 RX ORDER — CIPROFLOXACIN LACTATE 400MG/40ML
400 VIAL (ML) INTRAVENOUS ONCE
Refills: 0 | Status: COMPLETED | OUTPATIENT
Start: 2024-06-07 | End: 2024-06-07

## 2024-06-07 RX ORDER — CHLORHEXIDINE GLUCONATE 213 G/1000ML
15 SOLUTION TOPICAL
Refills: 0 | Status: DISCONTINUED | OUTPATIENT
Start: 2024-06-07 | End: 2024-06-14

## 2024-06-07 RX ADMIN — PANTOPRAZOLE SODIUM 40 MILLIGRAM(S): 20 TABLET, DELAYED RELEASE ORAL at 05:31

## 2024-06-07 RX ADMIN — APIXABAN 5 MILLIGRAM(S): 2.5 TABLET, FILM COATED ORAL at 05:45

## 2024-06-07 RX ADMIN — ATORVASTATIN CALCIUM 20 MILLIGRAM(S): 80 TABLET, FILM COATED ORAL at 23:47

## 2024-06-07 RX ADMIN — Medication 1 PACKET(S): at 11:31

## 2024-06-07 RX ADMIN — Medication 400 MILLIGRAM(S): at 11:36

## 2024-06-07 RX ADMIN — CHLORHEXIDINE GLUCONATE 15 MILLILITER(S): 213 SOLUTION TOPICAL at 05:45

## 2024-06-07 RX ADMIN — Medication 100 MILLIGRAM(S): at 23:47

## 2024-06-07 RX ADMIN — HUMAN INSULIN 4 UNIT(S): 100 INJECTION, SUSPENSION SUBCUTANEOUS at 05:32

## 2024-06-07 RX ADMIN — CHLORHEXIDINE GLUCONATE 15 MILLILITER(S): 213 SOLUTION TOPICAL at 17:12

## 2024-06-07 RX ADMIN — HUMAN INSULIN 4 UNIT(S): 100 INJECTION, SUSPENSION SUBCUTANEOUS at 23:47

## 2024-06-07 RX ADMIN — ALBUTEROL 2.5 MILLIGRAM(S): 90 AEROSOL, METERED ORAL at 16:10

## 2024-06-07 RX ADMIN — ALBUTEROL 2.5 MILLIGRAM(S): 90 AEROSOL, METERED ORAL at 21:43

## 2024-06-07 RX ADMIN — ALBUTEROL 2.5 MILLIGRAM(S): 90 AEROSOL, METERED ORAL at 03:19

## 2024-06-07 RX ADMIN — AMLODIPINE BESYLATE 10 MILLIGRAM(S): 2.5 TABLET ORAL at 05:31

## 2024-06-07 RX ADMIN — HUMAN INSULIN 4 UNIT(S): 100 INJECTION, SUSPENSION SUBCUTANEOUS at 17:17

## 2024-06-07 RX ADMIN — ALBUTEROL 2.5 MILLIGRAM(S): 90 AEROSOL, METERED ORAL at 08:24

## 2024-06-07 RX ADMIN — APIXABAN 5 MILLIGRAM(S): 2.5 TABLET, FILM COATED ORAL at 17:04

## 2024-06-07 RX ADMIN — Medication 4: at 17:17

## 2024-06-07 RX ADMIN — Medication 2: at 11:38

## 2024-06-07 RX ADMIN — Medication 1 PACKET(S): at 17:03

## 2024-06-07 RX ADMIN — ERYTHROPOIETIN 10000 UNIT(S): 10000 INJECTION, SOLUTION INTRAVENOUS; SUBCUTANEOUS at 18:25

## 2024-06-07 RX ADMIN — PANTOPRAZOLE SODIUM 40 MILLIGRAM(S): 20 TABLET, DELAYED RELEASE ORAL at 17:06

## 2024-06-07 RX ADMIN — Medication 300 MILLIGRAM(S): at 11:31

## 2024-06-07 RX ADMIN — CARVEDILOL PHOSPHATE 25 MILLIGRAM(S): 80 CAPSULE, EXTENDED RELEASE ORAL at 23:47

## 2024-06-07 RX ADMIN — Medication 100 MILLIGRAM(S): at 12:38

## 2024-06-07 RX ADMIN — HUMAN INSULIN 4 UNIT(S): 100 INJECTION, SUSPENSION SUBCUTANEOUS at 00:06

## 2024-06-07 RX ADMIN — Medication 2: at 00:06

## 2024-06-07 RX ADMIN — Medication 100 MILLIGRAM(S): at 04:30

## 2024-06-07 RX ADMIN — CARVEDILOL PHOSPHATE 25 MILLIGRAM(S): 80 CAPSULE, EXTENDED RELEASE ORAL at 05:31

## 2024-06-07 RX ADMIN — HUMAN INSULIN 4 UNIT(S): 100 INJECTION, SUSPENSION SUBCUTANEOUS at 11:38

## 2024-06-07 RX ADMIN — CHLORHEXIDINE GLUCONATE 1 APPLICATION(S): 213 SOLUTION TOPICAL at 05:32

## 2024-06-07 RX ADMIN — Medication 2: at 05:32

## 2024-06-07 NOTE — PROGRESS NOTE ADULT - SUBJECTIVE AND OBJECTIVE BOX
CHIEF COMPLAINT: Patient is a 73y old  Male who presents with a chief complaint of Unstable angina, abn EKG (07 Jun 2024 06:51)      INTERVAL EVENTS:    REVIEW OF SYSTEMS: Seen by bedside during AM rounds and     Mode: standby      OBJECTIVE:  ICU Vital Signs Last 24 Hrs  T(C): 37.2 (07 Jun 2024 04:00), Max: 37.2 (07 Jun 2024 04:00)  T(F): 98.9 (07 Jun 2024 04:00), Max: 98.9 (07 Jun 2024 04:00)  HR: 59 (07 Jun 2024 08:20) (59 - 70)  BP: 156/56 (07 Jun 2024 04:00) (144/58 - 161/59)  BP(mean): --  ABP: --  ABP(mean): --  RR: 16 (07 Jun 2024 08:20) (16 - 19)  SpO2: 100% (07 Jun 2024 08:20) (96% - 100%)    O2 Parameters below as of 07 Jun 2024 08:20  Patient On (Oxygen Delivery Method): tracheostomy collar  O2 Flow (L/min): 7  O2 Concentration (%): 28      Mode: standby    06-06 @ 07:01  -  06-07 @ 07:00  --------------------------------------------------------  IN: 1280 mL / OUT: 0 mL / NET: 1280 mL      CAPILLARY BLOOD GLUCOSE      POCT Blood Glucose.: 181 mg/dL (07 Jun 2024 05:27)      HOSPITAL MEDICATIONS:  MEDICATIONS  (STANDING):  albuterol    0.083% 2.5 milliGRAM(s) Nebulizer every 6 hours  amLODIPine   Tablet 10 milliGRAM(s) Oral daily  apixaban 5 milliGRAM(s) Enteral Tube every 12 hours  atorvastatin 20 milliGRAM(s) Oral at bedtime  carvedilol 25 milliGRAM(s) Oral every 12 hours  chlorhexidine 0.12% Liquid 15 milliLiter(s) Oral Mucosa every 12 hours  chlorhexidine 2% Cloths 1 Application(s) Topical <User Schedule>  dextrose 10% Bolus 125 milliLiter(s) IV Bolus once  dextrose 5%. 1000 milliLiter(s) (50 mL/Hr) IV Continuous <Continuous>  dextrose 5%. 1000 milliLiter(s) (100 mL/Hr) IV Continuous <Continuous>  dextrose 50% Injectable 12.5 Gram(s) IV Push once  dextrose 50% Injectable 25 Gram(s) IV Push once  epoetin reilly (EPOGEN) Injectable 43146 Unit(s) IV Push <User Schedule>  ferrous    sulfate Liquid 300 milliGRAM(s) Enteral Tube daily  glucagon  Injectable 1 milliGRAM(s) IntraMuscular once  hydrALAZINE 100 milliGRAM(s) Oral three times a day  insulin lispro (ADMELOG) corrective regimen sliding scale   SubCutaneous every 6 hours  insulin NPH human recombinant 4 Unit(s) SubCutaneous every 6 hours  pantoprazole  Injectable 40 milliGRAM(s) IV Push every 12 hours    MEDICATIONS  (PRN):  acetaminophen   Oral Liquid .. 650 milliGRAM(s) Oral every 6 hours PRN Temp greater or equal to 38C (100.4F), Moderate Pain (4 - 6)  dextrose Oral Gel 15 Gram(s) Oral once PRN Blood Glucose LESS THAN 70 milliGRAM(s)/deciliter  hydrALAZINE Injectable 10 milliGRAM(s) IV Push every 8 hours PRN SBP > 180  sodium chloride 0.9% lock flush 10 milliLiter(s) IV Push every 1 hour PRN Pre/post blood products, medications, blood draw, and to maintain line patency      PHYSICAL EXAMINATION    LABS:                        7.0    13.84 )-----------( 404      ( 07 Jun 2024 03:15 )             22.5     06-07    137  |  97<L>  |  42<H>  ----------------------------<  170<H>  3.7   |  26  |  4.72<H>    Ca    8.5      07 Jun 2024 03:15  Phos  2.0     06-07  Mg     2.30     06-07      PTT - ( 07 Jun 2024 03:15 )  PTT:30.3 sec  Urinalysis Basic - ( 07 Jun 2024 03:15 )    Color: x / Appearance: x / SG: x / pH: x  Gluc: 170 mg/dL / Ketone: x  / Bili: x / Urobili: x   Blood: x / Protein: x / Nitrite: x   Leuk Esterase: x / RBC: x / WBC x   Sq Epi: x / Non Sq Epi: x / Bacteria: x            PAST MEDICAL & SURGICAL HISTORY:  Diabetes      Benign essential HTN      HLD (hyperlipidemia)      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 CHIEF COMPLAINT: Patient is a 73y old  Male who presents with a chief complaint of Unstable angina, abn EKG (07 Jun 2024 06:51)      INTERVAL EVENTS:  - No interval events overnight  - Tolerating TC well however secretion copious and empirically tx for trachitis   - Tolerating PMV well   - HH low without bleeding and transfuse 1 U PRBC with HD    REVIEW OF SYSTEMS: Seen by bedside during AM rounds and denies pain or SOB    Mode: standby      OBJECTIVE:  ICU Vital Signs Last 24 Hrs  T(C): 37.2 (07 Jun 2024 04:00), Max: 37.2 (07 Jun 2024 04:00)  T(F): 98.9 (07 Jun 2024 04:00), Max: 98.9 (07 Jun 2024 04:00)  HR: 59 (07 Jun 2024 08:20) (59 - 70)  BP: 156/56 (07 Jun 2024 04:00) (144/58 - 161/59)  BP(mean): --  ABP: --  ABP(mean): --  RR: 16 (07 Jun 2024 08:20) (16 - 19)  SpO2: 100% (07 Jun 2024 08:20) (96% - 100%)    O2 Parameters below as of 07 Jun 2024 08:20  Patient On (Oxygen Delivery Method): tracheostomy collar  O2 Flow (L/min): 7  O2 Concentration (%): 28      Mode: standby    06-06 @ 07:01  -  06-07 @ 07:00  --------------------------------------------------------  IN: 1280 mL / OUT: 0 mL / NET: 1280 mL      CAPILLARY BLOOD GLUCOSE  POCT Blood Glucose.: 181 mg/dL (07 Jun 2024 05:27)      HOSPITAL MEDICATIONS:  MEDICATIONS  (STANDING):  albuterol    0.083% 2.5 milliGRAM(s) Nebulizer every 6 hours  amLODIPine   Tablet 10 milliGRAM(s) Oral daily  apixaban 5 milliGRAM(s) Enteral Tube every 12 hours  atorvastatin 20 milliGRAM(s) Oral at bedtime  carvedilol 25 milliGRAM(s) Oral every 12 hours  chlorhexidine 0.12% Liquid 15 milliLiter(s) Oral Mucosa every 12 hours  chlorhexidine 2% Cloths 1 Application(s) Topical <User Schedule>  dextrose 10% Bolus 125 milliLiter(s) IV Bolus once  dextrose 5%. 1000 milliLiter(s) (50 mL/Hr) IV Continuous <Continuous>  dextrose 5%. 1000 milliLiter(s) (100 mL/Hr) IV Continuous <Continuous>  dextrose 50% Injectable 12.5 Gram(s) IV Push once  dextrose 50% Injectable 25 Gram(s) IV Push once  epoetin reilly (EPOGEN) Injectable 36874 Unit(s) IV Push <User Schedule>  ferrous    sulfate Liquid 300 milliGRAM(s) Enteral Tube daily  glucagon  Injectable 1 milliGRAM(s) IntraMuscular once  hydrALAZINE 100 milliGRAM(s) Oral three times a day  insulin lispro (ADMELOG) corrective regimen sliding scale   SubCutaneous every 6 hours  insulin NPH human recombinant 4 Unit(s) SubCutaneous every 6 hours  pantoprazole  Injectable 40 milliGRAM(s) IV Push every 12 hours    MEDICATIONS  (PRN):  acetaminophen   Oral Liquid .. 650 milliGRAM(s) Oral every 6 hours PRN Temp greater or equal to 38C (100.4F), Moderate Pain (4 - 6)  dextrose Oral Gel 15 Gram(s) Oral once PRN Blood Glucose LESS THAN 70 milliGRAM(s)/deciliter  hydrALAZINE Injectable 10 milliGRAM(s) IV Push every 8 hours PRN SBP > 180  sodium chloride 0.9% lock flush 10 milliLiter(s) IV Push every 1 hour PRN Pre/post blood products, medications, blood draw, and to maintain line patency      PHYSICAL EXAMINATION  General: NAD   HEENT: Trach with PMV good phonation however copious secretions   Cards: S1/S2, no murmurs   Pulm: CTA bilaterally. No wheezes.   Abdomen: Soft, nondistended and nontender. BS (+) PEG present.   Extremities: No pedal edema. THAI of BL upper and lower extremities with severe weakness   Neurology: AOx3 with no acute focal neurological deficits     LABS:                        7.0    13.84 )-----------( 404      ( 07 Jun 2024 03:15 )             22.5     06-07    137  |  97<L>  |  42<H>  ----------------------------<  170<H>  3.7   |  26  |  4.72<H>    Ca    8.5      07 Jun 2024 03:15  Phos  2.0     06-07  Mg     2.30     06-07      PTT - ( 07 Jun 2024 03:15 )  PTT:30.3 sec    Urinalysis Basic - ( 07 Jun 2024 03:15 )  Color: x / Appearance: x / SG: x / pH: x  Gluc: 170 mg/dL / Ketone: x  / Bili: x / Urobili: x   Blood: x / Protein: x / Nitrite: x   Leuk Esterase: x / RBC: x / WBC x   Sq Epi: x / Non Sq Epi: x / Bacteria: x            PAST MEDICAL & SURGICAL HISTORY:  Diabetes      Benign essential HTN      HLD (hyperlipidemia)      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

## 2024-06-07 NOTE — DISCHARGE NOTE NURSING/CASE MANAGEMENT/SOCIAL WORK - PATIENT PORTAL LINK FT
You can access the FollowMyHealth Patient Portal offered by [facility] [place] by registering at the following website: [email or web address]. By joining [facility]’s FollowMyHealth portal, you will also be able to view your health information using other applications (apps) compatible with our system.

## 2024-06-07 NOTE — PROGRESS NOTE ADULT - NS ATTEND AMEND GEN_ALL_CORE FT
agree with above  awake and alert, doing well on TC, PMV in place and phonating well  pt still with thick tracheal secretions and GN victor m - will give 5d course cipro for tracheitis  HD today  OOB to chair  d/c plan - acute rehab  wife and daughter at bedside

## 2024-06-07 NOTE — PROGRESS NOTE ADULT - SUBJECTIVE AND OBJECTIVE BOX
[facility] [place] [facility]   MD ELIANE BHAGAT MD ANGELA WONG, PA    TEL:  OFFICE: [phone]  From 5pm-7am Answering Service [phone]    -- RENAL FOLLOW UP NOTE ---Date of Service 06-07-24 @ 11:20    Patient is a 73y old  Male who presents with a chief complaint of Unstable angina, abn EKG (07 Jun 2024 08:53)      Patient seen and examined at bedside.     VITALS:  T(F): 98.9 (06-07-24 @ 04:00), Max: 98.9 (06-07-24 @ 04:00)  HR: 59 (06-07-24 @ 08:20)  BP: 156/56 (06-07-24 @ 04:00)  RR: 16 (06-07-24 @ 08:20)  SpO2: 100% (06-07-24 @ 08:20)  Wt(kg): --    06-06 @ 07:01  -  06-07 @ 07:00  --------------------------------------------------------  IN: 1280 mL / OUT: 0 mL / NET: 1280 mL          PHYSICAL EXAM:  General: NAD  Neck: +trach  Respiratory: CTAB, no wheezes, rales or rhonchi  Cardiovascular: S1, S2, RRR  Gastrointestinal: +peg  Extremities: No peripheral edema    Hospital Medications:   MEDICATIONS  (STANDING):  albuterol    0.083% 2.5 milliGRAM(s) Nebulizer every 6 hours  amLODIPine   Tablet 10 milliGRAM(s) Oral daily  apixaban 5 milliGRAM(s) Enteral Tube every 12 hours  atorvastatin 20 milliGRAM(s) Oral at bedtime  carvedilol 25 milliGRAM(s) Oral every 12 hours  chlorhexidine 0.12% Liquid 15 milliLiter(s) Oral Mucosa every 12 hours  chlorhexidine 2% Cloths 1 Application(s) Topical <User Schedule>  ciprofloxacin   IVPB      ciprofloxacin   IVPB 400 milliGRAM(s) IV Intermittent once  dextrose 10% Bolus 125 milliLiter(s) IV Bolus once  dextrose 5%. 1000 milliLiter(s) (50 mL/Hr) IV Continuous <Continuous>  dextrose 5%. 1000 milliLiter(s) (100 mL/Hr) IV Continuous <Continuous>  dextrose 50% Injectable 12.5 Gram(s) IV Push once  dextrose 50% Injectable 25 Gram(s) IV Push once  epoetin reilly (EPOGEN) Injectable 86332 Unit(s) IV Push <User Schedule>  ferrous    sulfate Liquid 300 milliGRAM(s) Enteral Tube daily  glucagon  Injectable 1 milliGRAM(s) IntraMuscular once  hydrALAZINE 100 milliGRAM(s) Oral three times a day  insulin lispro (ADMELOG) corrective regimen sliding scale   SubCutaneous every 6 hours  insulin NPH human recombinant 4 Unit(s) SubCutaneous every 6 hours  pantoprazole  Injectable 40 milliGRAM(s) IV Push every 12 hours      LABS:  06-07    137  |  97<L>  |  42<H>  ----------------------------<  170<H>  3.7   |  26  |  4.72<H>    Ca    8.5      07 Jun 2024 03:15  Phos  2.0     06-07  Mg     2.30     06-07      Creatinine Trend: 4.72 <--, 3.55 <--, 4.69 <--, 5.15 <--, 4.13 <--, 6.00 <--, 5.26 <--, 4.19 <--    Phosphorus: 2.0 mg/dL (06-07 @ 03:15)                              7.0    13.84 )-----------( 404      ( 07 Jun 2024 03:15 )             22.5     Urine Studies:  Urinalysis - [06-07-24 @ 03:15]      Color  / Appearance  / SG  / pH       Gluc 170 / Ketone   / Bili  / Urobili        Blood  / Protein  / Leuk Est  / Nitrite       RBC  / WBC  / Hyaline  / Gran  / Sq Epi  / Non Sq Epi  / Bacteria       Iron 16, TIBC 121, %sat 13      [05-17-24 @ 06:00]  Ferritin 720      [05-17-24 @ 06:00]  PTH -- (Ca --)      [05-26-24 @ 01:20]   122  TSH 2.60      [05-17-24 @ 06:00]  Lipid: chol 86, , HDL 27, LDL --      [05-17-24 @ 06:00]    HBsAb <3.0      [06-03-24 @ 16:20]  HBsAg Nonreact      [06-03-24 @ 16:20]  HBcAb Nonreact      [06-03-24 @ 16:20]  HCV 0.09, Nonreact      [06-03-24 @ 16:20]      RADIOLOGY & ADDITIONAL STUDIES:

## 2024-06-07 NOTE — PROGRESS NOTE ADULT - SUBJECTIVE AND OBJECTIVE BOX
Patient is a 73y old  Male who presents with a chief complaint of Unstable angina, abn EKG (07 Jun 2024 11:19)      SUBJECTIVE / OVERNIGHT EVENTS: thick resp secretions, afebrile, on IV Cipro    MEDICATIONS  (STANDING):  albuterol    0.083% 2.5 milliGRAM(s) Nebulizer every 6 hours  amLODIPine   Tablet 10 milliGRAM(s) Oral daily  apixaban 5 milliGRAM(s) Enteral Tube every 12 hours  atorvastatin 20 milliGRAM(s) Oral at bedtime  carvedilol 25 milliGRAM(s) Oral every 12 hours  chlorhexidine 0.12% Liquid 15 milliLiter(s) Oral Mucosa two times a day  chlorhexidine 2% Cloths 1 Application(s) Topical <User Schedule>  ciprofloxacin   IVPB      dextrose 10% Bolus 125 milliLiter(s) IV Bolus once  dextrose 5%. 1000 milliLiter(s) (100 mL/Hr) IV Continuous <Continuous>  dextrose 5%. 1000 milliLiter(s) (50 mL/Hr) IV Continuous <Continuous>  dextrose 50% Injectable 12.5 Gram(s) IV Push once  dextrose 50% Injectable 25 Gram(s) IV Push once  epoetin reilly (EPOGEN) Injectable 29246 Unit(s) IV Push <User Schedule>  ferrous    sulfate Liquid 300 milliGRAM(s) Enteral Tube daily  glucagon  Injectable 1 milliGRAM(s) IntraMuscular once  hydrALAZINE 100 milliGRAM(s) Oral three times a day  insulin lispro (ADMELOG) corrective regimen sliding scale   SubCutaneous every 6 hours  insulin NPH human recombinant 4 Unit(s) SubCutaneous every 6 hours  pantoprazole  Injectable 40 milliGRAM(s) IV Push every 12 hours    MEDICATIONS  (PRN):  acetaminophen   Oral Liquid .. 650 milliGRAM(s) Oral every 6 hours PRN Temp greater or equal to 38C (100.4F), Moderate Pain (4 - 6)  dextrose Oral Gel 15 Gram(s) Oral once PRN Blood Glucose LESS THAN 70 milliGRAM(s)/deciliter  hydrALAZINE Injectable 10 milliGRAM(s) IV Push every 8 hours PRN SBP > 180  sodium chloride 0.9% lock flush 10 milliLiter(s) IV Push every 1 hour PRN Pre/post blood products, medications, blood draw, and to maintain line patency      Vital Signs Last 24 Hrs  T(F): 97.7 (06-07-24 @ 21:20), Max: 98.9 (06-07-24 @ 04:00)  HR: 60 (06-07-24 @ 21:47) (59 - 70)  BP: 148/65 (06-07-24 @ 21:20) (124/82 - 156/56)  RR: 18 (06-07-24 @ 21:20) (16 - 19)  SpO2: 99% (06-07-24 @ 21:47) (97% - 100%)  Telemetry:   CAPILLARY BLOOD GLUCOSE      POCT Blood Glucose.: 217 mg/dL (07 Jun 2024 17:14)  POCT Blood Glucose.: 188 mg/dL (07 Jun 2024 11:15)  POCT Blood Glucose.: 181 mg/dL (07 Jun 2024 05:27)  POCT Blood Glucose.: 176 mg/dL (06 Jun 2024 23:31)    I&O's Summary    06 Jun 2024 07:01  -  07 Jun 2024 07:00  --------------------------------------------------------  IN: 1280 mL / OUT: 0 mL / NET: 1280 mL    07 Jun 2024 07:01  -  07 Jun 2024 22:52  --------------------------------------------------------  IN: 1540 mL / OUT: 2700 mL / NET: -1160 mL        PHYSICAL EXAM:  GENERAL: NAD, well-developed  HEAD:  Atraumatic, Normocephalic  EYES: EOMI, PERRLA, conjunctiva and sclera clear  NECK: Supple, No JVD  CHEST/LUNG: Clear to auscultation bilaterally; No wheeze  HEART: Regular rate and rhythm; No murmurs, rubs, or gallops  ABDOMEN: Soft, Nontender, Nondistended; Bowel sounds present  EXTREMITIES:  2+ Peripheral Pulses, No clubbing, cyanosis, or edema  PSYCH: AAOx3  NEUROLOGY: non-focal  SKIN: No rashes or lesions    LABS:                        7.0    13.84 )-----------( 404      ( 07 Jun 2024 03:15 )             22.5     06-07    137  |  97<L>  |  42<H>  ----------------------------<  170<H>  3.7   |  26  |  4.72<H>    Ca    8.5      07 Jun 2024 03:15  Phos  2.0     06-07  Mg     2.30     06-07      PTT - ( 07 Jun 2024 03:15 )  PTT:30.3 sec      Urinalysis Basic - ( 07 Jun 2024 03:15 )    Color: x / Appearance: x / SG: x / pH: x  Gluc: 170 mg/dL / Ketone: x  / Bili: x / Urobili: x   Blood: x / Protein: x / Nitrite: x   Leuk Esterase: x / RBC: x / WBC x   Sq Epi: x / Non Sq Epi: x / Bacteria: x        RADIOLOGY & ADDITIONAL TESTS:    Imaging Personally Reviewed:    Consultant(s) Notes Reviewed:      Care Discussed with Consultants/Other Providers:

## 2024-06-07 NOTE — PROGRESS NOTE ADULT - ASSESSMENT
hgb stable, trending down  doubt overt gib  high risk endosopic evalaiton  ppi htearpy  consider transfsusion to 8/9

## 2024-06-07 NOTE — PROGRESS NOTE ADULT - SUBJECTIVE AND OBJECTIVE BOX
Patient is a 73y Male     Patient is a 73y old  Male who presents with a chief complaint of Unstable angina, abn EKG (06 Jun 2024 23:34)      HPI:  73-year-old male past medical history hypertension, ESRD on dialysis Monday Wednesday Friday, diabetes insulin-dependent presents with hiccups patient endorses persistent hiccups for the last 2 days. found to have peaked T waves, a new finding. denies dizziness, N/V, denies CP, palps, abd pain (29 Apr 2024 21:15)      PAST MEDICAL & SURGICAL HISTORY:  Diabetes      Benign essential HTN      HLD (hyperlipidemia)      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      ESRD on hemodialysis      Arteriovenous fistula          MEDICATIONS  (STANDING):  albuterol    0.083% 2.5 milliGRAM(s) Nebulizer every 6 hours  amLODIPine   Tablet 10 milliGRAM(s) Oral daily  apixaban 5 milliGRAM(s) Enteral Tube every 12 hours  atorvastatin 20 milliGRAM(s) Oral at bedtime  carvedilol 25 milliGRAM(s) Oral every 12 hours  chlorhexidine 0.12% Liquid 15 milliLiter(s) Oral Mucosa every 12 hours  chlorhexidine 2% Cloths 1 Application(s) Topical <User Schedule>  dextrose 10% Bolus 125 milliLiter(s) IV Bolus once  dextrose 5%. 1000 milliLiter(s) (50 mL/Hr) IV Continuous <Continuous>  dextrose 5%. 1000 milliLiter(s) (100 mL/Hr) IV Continuous <Continuous>  dextrose 50% Injectable 12.5 Gram(s) IV Push once  dextrose 50% Injectable 25 Gram(s) IV Push once  epoetin reilly (EPOGEN) Injectable 94868 Unit(s) IV Push <User Schedule>  ferrous    sulfate Liquid 300 milliGRAM(s) Enteral Tube daily  glucagon  Injectable 1 milliGRAM(s) IntraMuscular once  hydrALAZINE 100 milliGRAM(s) Oral three times a day  insulin lispro (ADMELOG) corrective regimen sliding scale   SubCutaneous every 6 hours  insulin NPH human recombinant 4 Unit(s) SubCutaneous every 6 hours  pantoprazole  Injectable 40 milliGRAM(s) IV Push every 12 hours      Allergies    No Known Allergies    Intolerances        SOCIAL HISTORY:  Denies ETOh,Smoking,     FAMILY HISTORY:  FHx: diabetes mellitus (Father, Aunt)        REVIEW OF SYSTEMS:    CONSTITUTIONAL: No weakness, fevers or chills  EYES/ENT: No visual changes;  No vertigo or throat pain   NECK: No pain or stiffness  RESPIRATORY: No cough, wheezing, hemoptysis; No shortness of breath  CARDIOVASCULAR: No chest pain or palpitations  GASTROINTESTINAL: No abdominal or epigastric pain. No nausea, vomiting, or hematemesis; No diarrhea or constipation. No melena or hematochezia.  GENITOURINARY: No dysuria, frequency or hematuria  NEUROLOGICAL: No numbness or weakness  SKIN: No itching, burning, rashes, or lesions   All other review of systems is negative unless indicated above.    VITAL:  T(C): , Max: 37.2 (06-07-24 @ 04:00)  T(F): , Max: 98.9 (06-07-24 @ 04:00)  HR: 66 (06-07-24 @ 04:00)  BP: 156/56 (06-07-24 @ 04:00)  BP(mean): --  RR: 18 (06-07-24 @ 04:00)  SpO2: 97% (06-07-24 @ 04:00)  Wt(kg): --    I and O's:    06-04 @ 07:01  -  06-05 @ 07:00  --------------------------------------------------------  IN: 280 mL / OUT: 0 mL / NET: 280 mL    06-05 @ 07:01  -  06-06 @ 07:00  --------------------------------------------------------  IN: 1048 mL / OUT: 2400 mL / NET: -1352 mL    06-06 @ 07:01  -  06-07 @ 06:52  --------------------------------------------------------  IN: 1280 mL / OUT: 0 mL / NET: 1280 mL          PHYSICAL EXAM:    Constitutional: NAD  HEENT: PERRLA,   Neck: No JVD  Respiratory: CTA B/L  Cardiovascular: S1 and S2  Gastrointestinal: BS+, soft, NT/ND  Extremities: No peripheral edema  Neurological: A/O x 3, no focal deficits  Psychiatric: Normal mood, normal affect  : No Abbasi  Skin: No rashes  Access: Not applicable  Back: No CVA tenderness    LABS:                        7.0    13.84 )-----------( 404      ( 07 Jun 2024 03:15 )             22.5     06-07    137  |  97<L>  |  42<H>  ----------------------------<  170<H>  3.7   |  26  |  4.72<H>    Ca    8.5      07 Jun 2024 03:15  Phos  2.0     06-07  Mg     2.30     06-07            RADIOLOGY & ADDITIONAL STUDIES:

## 2024-06-07 NOTE — PROGRESS NOTE ADULT - ASSESSMENT
ASSESSMENT   74 YO M with PMHx of ESRD on HD MWF, HTN, and 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 Baclofen started and course complicated by AMS second to baclofen toxicity requiring intubation and MICU transfer. Course further complicated by LEFT pontine and cerebellar stroke, R AVF stenosis, aspiration and B Cereus bacteremia and RLE DVT. s/p trach and transferred to RCU 5/16 and course complicated by intermittent fever spikes with likely aspiration citrobacter PNA, AOCD, and GIB vs Fe stools.     PLAN  NEUROLOGY  # AMS   - MRI HEAD (5/6) with punctate foci in the left talya and left cerebellum with concern for acute infarct.   - MRA HEAD and NECK (5/6) with no large vessel occlusion or stenosis.  - Continue on Lipitor for medical management     PSYCH   # Depression   - Concern for depression with questionable SI   - CO started, however formal discussion and evaluation with no true SI   - Supportive care and encouragement QD    CARDIOVASCULAR  # CP with NSTEMI < demand ischemia with HTN   - Admitted for CP and HTN with peaked T WAVES and ECG with ST deviation on admission   - Troponins mildly elevated on admission with negative CKMB   - TTE (4/30) with EF 72, normal LVRVSF, and no regional wall motion abnormalities   ** Type II myocardial infarction 2/2 distributive shock earlier on admission.  - Cardiology following - No need to pursue cath at this time given debility and chronic respiratory failure s/p trach 5/14.     # Septic vs vasoplegic shock  # Hx of HTN   - Home BP medications held and pressors weaned off   - Continue on coreg 25 (increased 6/1), hydralazine 100 TID and Norvasc 10mg (increased 5/31)   - Monitor blood pressures with Hydralazine 10mg IVP Q8H PRN   - IF remains hypertensive then add Isordil     RESPIRATORY  # Respiratory failure   - AMS noted with baclofen toxicity requiring intubation   - Attempted extubation in MICU however course complicated by aspiration and prolonged course and now s/p tracheostomy with IP on 5/14  - Continued on vent 12/500/5/30 and weaned to TC ATC   - Continue on Albuterol with TC ATC (5/30 - )   - Able to tolerate PMV during the day with good phonation     GI  # Dysphagia   - s/p surgical PEG 5/17   - Failed green dye on 6/1 and continue on tube feeds via PEG   - Continue with PMV during the day this week and reattempt SS sometime next week.     # GIB  - Noted with several episodes of black stool with drop in Hgb (5/26 overnight) requiring PRBC with appropriate response  - Case discussed with GI, questionable anemia from AOCD with ESRD and black stool likely from iron, and no plan for scope at this time  - Continue on PPI and monitor stools    RENAL  # ESRD on HD MWF with R BCF  # Fistula stenosis   - VA AVF (5/2) with Right radiocephalic arteriovenous fistula has no hemodynamically significant stenosis present at the anastomotic site ( cm/sec, EDV 49 cm/sec). The usable segment is partially thrombosed with minimal patency.  - Required R FEMORAL line on medicine, then removed on 5/2, and replaced with R IJ SHILEY on 5/3 for CRRT then converted back to iHD MWF   - R IJ SHILEY removed on 5/10 second to bacteremia and replaced on 5/13  - R IJ SHILEY removed 5/27 given intermittent fevers    - Blood cultures negative and replaced SHILEY on 6/1   - Vein mapping with no adequate veins in UE for conduit (all <2 mm and/or thrombosed) and pending possible revision of RUE AVF.   - s/p RUE Fistulogram on 6/4 - successfully accessed for HD 6/5  - Removed RIJ Shiley 6/6  - Continue on HD MWF per Renal     # Hyperphosphatemia   - Renvela 1600 however phos corrects well with HD   - Monitor off Renvela     INFECTIOUS DISEASE  # Aspiration concern   - Recurrent fever spike and CXR with RLL opacity   - BCx (5/25 and 5/30) negative   - SCx (5/25) negative   - SCx (5/30) with citrobacter and completed Zosyn empirically (5/18 - 5/28)  - Thick secretions 6/6, follow up sputum cx    # B Cereus Bacteremia   - Course complicated by fevers and aspiration event   - MRSA (5/1) negative   - BCx (5/2) negative   - SCx (5/4) and BAL (5/12) negative   - BCx (5/10) with bacillus cereus and cleared on (5/12).   - Case discussed with ID and s/p Vancomycin through 5/21 x 10 day course.   - Continue to monitor off antibiotics per ID     HEME  # AOCD with ESRD   - Anemia panel with AOCD and low % sat   - EPO 10K continued on TIW   - Ferrous supplement  - Transfused on 5/16 and 5/26 and will monitor HH     VASCULAR   # RLE DVT   - CT AP (5/12) with nonocclusive RIGHT common iliac vein deep venous thrombosis and case discussed with IR with no plans for IVC filter.   - RLE duplex 5/28 with no evidence of DVT  - Initially started on a Heparin GTT with course complicated by questionable GIB given dark tarry stools, however more likely second to iron tabs.   - Discontinued heparin GTT 6/6 and transitioned to Eliquis  - Continue 5 mg Eliquis BID, dose discussed with pharmacy given ESRD     ENDOCRINE  # IDDM2 A1C 6.9  - c/w NPH 4U Q6 and ISS  - Monitor and adjust insulin based on FS     SKIN  - R FEMORAL (5/1-5/2)   - R IJ SHILEY (5/3-5/10)   - R IJ SHILEY (5/13 - 5/27)   - R IJ SHILEY (6/1 - 6/6)     ETHICS/ GOC    - FULL CODE     DISPO - PMR recc ACUTE   74 YO M with PMHx of ESRD on HD MWF, HTN, and 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 Baclofen started and course complicated by AMS second to baclofen toxicity requiring intubation and MICU transfer. Course further complicated by LEFT pontine and cerebellar stroke, R AVF stenosis, aspiration and B Cereus bacteremia and RLE DVT. s/p trach and transferred to RCU 5/16 and course complicated by intermittent fever spikes with likely aspiration citrobacter PNA, AOCD, and GIB vs Fe stools. s/p fistulogram 6/4 and able to wean off vent to TC ATC.     NEUROLOGY  # AMS   - MRI HEAD (5/6) with punctate foci in the left talya and left cerebellum with concern for acute infarct.   - MRA HEAD and NECK (5/6) with no large vessel occlusion or stenosis.  - Continue on Lipitor for medical management     PSYCH   # Depression   - Concern for depression with questionable SI   - CO started, however formal discussion and evaluation with no true SI   - Supportive care and encouragement QD    CARDIOVASCULAR  # CP with NSTEMI < demand ischemia with HTN   - Admitted for CP and HTN with peaked T WAVES and ECG with ST deviation on admission   - Troponins mildly elevated on admission with negative CKMB   - TTE (4/30) with EF 72, normal LVRVSF, and no regional wall motion abnormalities   - No need to pursue cath at this time     # Septic vs vasoplegic shock  # Hx of HTN   - Home BP medications held and pressors weaned off   - Continue on coreg 25 (increased 6/1), hydralazine 100 TID and Norvasc 10mg (increased 5/31)   - Monitor blood pressures with Hydralazine 10mg IVP Q8H PRN   - IF remains hypertensive then add Isordil     RESPIRATORY  # Respiratory failure   - AMS noted with baclofen toxicity requiring intubation   - Attempted extubation in MICU however course complicated by aspiration and prolonged course and now s/p tracheostomy with IP on 5/14  - Continued on vent 12/500/5/30 and weaned to TC ATC   - Continue on Albuterol with TC ATC (5/30 - )   - Able to tolerate PMV during the day with good phonation   - Copious secretions but hold HTS as strong cough     GI  # Dysphagia   - s/p surgical PEG 5/17   - Failed green dye on 6/1 and continue on tube feeds via PEG   - Continue with PMV during the day this week and reattempt SS sometime next week.     # GIB  - Noted with several episodes of black stool with drop in Hgb (5/26 overnight) requiring PRBC with appropriate response  - Case discussed with GI, questionable anemia from AOCD with ESRD and black stool likely from iron, and no plan for scope at this time  - Continue on PPI and monitor stools    RENAL  # ESRD on HD MWF with R BCF  # Fistula stenosis   - VA AVF (5/2) with Right radiocephalic arteriovenous fistula has no hemodynamically significant stenosis present at the anastomotic site ( cm/sec, EDV 49 cm/sec). The usable segment is partially thrombosed with minimal patency.  - Required R FEMORAL line on medicine, then removed on 5/2, and replaced with R IJ SHILEY on 5/3 for CRRT then converted back to iHD MWF   - R IJ SHILEY removed on 5/10 second to bacteremia and replaced on 5/13  - R IJ SHILEY removed 5/27 given intermittent fevers    - Blood cultures negative and replaced SHILEY on 6/1   - Vein mapping with no adequate veins in UE for conduit (all <2 mm and/or thrombosed) and pending possible revision of RUE AVF.   - s/p RUE Fistuloplasty on 6/4   - s/p Successfully HD via RUE AVF on 6/5  - s/p R IJ Shiley removed 6/6  - Continue on HD MWF per Renal     # Hyperphosphatemia   - Renvela 1600 however phos corrects well with HD   - Monitor off Renvela     INFECTIOUS DISEASE  # Citrobacter aspiration vs trachitis concern   - Recurrent fever spike and CXR with RLL opacity   - BCx (5/25 and 5/30) negative   - SCx (5/25) negative   - SCx (5/30) with citrobacter and completed Zosyn empirically (5/18 - 5/28)  - Thick secretions without fever and RPT SCx negative, however will empirically tx with cipro (6/7 - )     # B Cereus Bacteremia   - Course complicated by fevers and aspiration event   - MRSA (5/1) negative   - BCx (5/2) negative   - SCx (5/4) and BAL (5/12) negative   - BCx (5/10) with bacillus cereus and cleared on (5/12).   - Case discussed with ID and s/p Vancomycin through 5/21 x 10 day course.   - Continue to monitor off antibiotics per ID     HEME  # AOCD with ESRD   - Anemia panel with AOCD and low % sat   - EPO 10K continued on TIW   - Ferrous supplement  - Transfused on 5/16 and 5/26 and will monitor HH     VASCULAR   # RLE DVT   - CT AP (5/12) with nonocclusive RIGHT common iliac vein deep venous thrombosis and case discussed with IR with no plans for IVC filter.   - RLE duplex 5/28 with no evidence of DVT  - Initially started on a Heparin GTT with course complicated by questionable GIB given dark tarry stools, however more likely second to iron tabs.   - Discontinued heparin GTT 6/6 and transitioned to Eliquis    ENDOCRINE  # IDDM2 A1C 6.9  - c/w NPH 4U Q6 and ISS  - Monitor and adjust insulin based on FS     SKIN  - R FEMORAL (5/1-5/2)   - R IJ SHILEY (5/3-5/10)   - R IJ SHILEY (5/13 - 5/27)   - R IJ SHILEY (6/1 - 6/6)     ETHICS/ GOC    - FULL CODE     DISPO - PMR recc ACUTE

## 2024-06-07 NOTE — PROGRESS NOTE ADULT - ASSESSMENT
73-year-old male past medical history hypertension, ESRD on dialysis Monday Wednesday Friday, diabetes insulin-dependent presents with hiccups patient endorses persistent hiccups for the last 2 days. Nephrology consulted for HD needs.    A/P  ESRD:  Center: [place]  Nephrologist: Dr. Hardwick  Access: R AVF nonfunction now s/p thrombectomy 6/4. hd via AVF working well 6/5  hd today  shiley removed 6/6  Consent obtained and placed in ED chart  Renal diet  Monitor BMP  Consider [facility] for rehab where his outpatient Nephrologist, Dr. Hardwick can follow him    Hyperkalemia/Hypokalemia  Improved w/ HD.  C/W HD schedule as above.  Lokelma 10gm PRN for K >5.3 on non-HD days  K stable.  Low K diet.  Monitor closely.    HTN:  BP fluctuating but acceptable   On carvedilol 12.5mg BID, hydralazine 100mg TID.  on norvasc   UF w/ HD as BP permits.  Monitor BP.    Anemia:  Hgb low.  + iron deficiency.  Tsat 13% - on ferrous sulfate 300mg qd via PEG.  Venofer held d/t leukocytosis.  SILVA w/ HD.  Transfuse for Hgb <7.  Monitor Hb.    CKD-MBD   - low for CKD.  PO4 low supplement today.  Sevelamer 1600mg TID d/c'ed 5/21.  Monitor Ca, PO4 daily    Hypocalcemia:  In setting of CKD vs. hypoalbuminemia.  Optimize albumin.  Corrected Ca WNL.  Replete as needed.  Monitor Ca.    hyponatremia  better  monitor

## 2024-06-07 NOTE — PROGRESS NOTE ADULT - ASSESSMENT
73-year-old male past medical history hypertension, ESRD on dialysis Monday Wednesday Friday, diabetes insulin-dependent presents with hiccups patient endorses persistent hiccups for the last 2 days.   found to have peaked T waves, a new finding. denied dizziness, N/V, denies CP, palps, abd pain  Upon admission seen by CArd, renal and GI  found to have a distended GB prob 2/2 gastroparesis, was started on Reglan  has received 4 doses of baclofen since 4/30 2/2 hiccups, last dose on 5/1 at 5 am  had received Haldol for agitation at 11 pm on 4/30, AMS observed in pm of 5/1  AMS ongoing, RRT x 2 called  now transferred to MICU for encephalopathy requiring  airway protection on 5/2,  intubated for airway protection, was on  propofol and pressors. now off  toxicology consulted upon dx of encephalopathy, not impressed AMS 2/2 Haldol nor baclofen . AMS was 2/2 acute CVA   HD was not done timely until femoral shiley was placed 2/2 clotted RUE AVF. now removed and RIJ shiley placed on 5/3  has been nonverbal since pm 5/1.     MR brain 5/6 c/w L pontine and L cerebellar acute infarcts, no hemorrhage . as per neuro: embolic in nature.   encephalopathy probably 2/2 acute CVA, now follows commands appropriately off sedation.   no SZ focus on EEG,   off CRRT,  HD resumed as per renal.   remains intubated, now trached  off keppra  AC resumed, was initially on  Heparin drip for R common iliac DVT, now held 2/2 GI bleed  completed 5 days of empiric ZOSYN on 5/7, shortly after became septic again after an extubation trial. bacteremia w B. cereus, on Vanc through 5/20 as per ID    s/p trache placement by pulm,  PEG placed by surgery 5/17, tolerating feeds  HD via R IJ.  Tmax overnight( 5/18)  101, cxr c/w RLL PNA, now on Zosyn, blood Cx s sent. remains on Vanco for B. cereus bacteremia   leukocytosis resolved  EKG changes on 5/3 c/w NSTEMI loaded w DAPT , was  on Heparin drip x 48 hrs. rpt TTE is unchanged  ID, Neuro , renal, cardiology following.  clotted RUE AVF.  fistulogram was cancelled on 5/25 2/2 fever  HD via Shiley, replaced 6/1  LP done, no sign of meningitis.   NEUROLOGY     - CTH without acute findings   - LP done, no acute findings  - MR brain w acute infarcts: L talya and L cerebellum, nonhemorrhagic  - no Sz focus on EEG    CARDIOVASCULAR   - ptn never had CP. just peaked T waves. was awaiting ischemic study w nucl stress test  - TTE showing EF 72%, rpt unchanged  - EKG changes on 5/3, loaded w DAPT  - BP improved.  on Norvasc, Coreg and hydralazine, max doses. as per renal add Imdur to improve BP    GI  - PEG placed, npo w tube feeds  - Gastroparesis       RENAL   -  HD as per renal  - R IJ shiley removed 6/6, successful HD via AVF on 6/5  - s/p balloon angioplasty of RUE AVF stenosis on 6/4. AVF used for HD on 6/5      INFECTIOUS DISEASE     completed a course of Zosyn, then became septic, bacteremic a B. cereus, completed 3 days of Meropenem, completed vanc through 5/21  on Zosyn since 5/18 for RLL PNA, afebrile, completed 5/23  recurrent fever 5/25, ZOsyn resumed and stopped on 5/28. afebrile since  cipro started empirically on 6/7 for copious secretions    ENDOCRINE   - DM  - cw ISS    DVT  - RLE, on Heparin drip,     GOC:  Full code  Dispo: acute rehab

## 2024-06-07 NOTE — PROGRESS NOTE ADULT - SUBJECTIVE AND OBJECTIVE BOX
Cardiovascular Disease Progress Note  DATE OF SERVICE: 06-07-24 @ 08:53    Overnight events: No acute events overnight.    The patient is in no distress on trach collar.   Otherwise review of systems negative    Objective Findings:  T(C): 37.2 (06-07-24 @ 04:00), Max: 37.2 (06-07-24 @ 04:00)  HR: 59 (06-07-24 @ 08:20) (59 - 70)  BP: 156/56 (06-07-24 @ 04:00) (144/58 - 161/59)  RR: 16 (06-07-24 @ 08:20) (16 - 19)  SpO2: 100% (06-07-24 @ 08:20) (96% - 100%)  Wt(kg): --  Daily     Daily       Physical Exam:  Gen: NAD; Patient resting comfortably  HEENT:   Normocephalic/ atraumatic  CV: RRR, normal S1 + S2, no m/r/g  Lungs:  Normal respiratory effort; clear to auscultation bilaterally  Abd: soft, non-tender; bowel sounds present  Ext: No edema; warm and well perfused    Telemetry: Sinus    Laboratory Data:                        7.0    13.84 )-----------( 404      ( 07 Jun 2024 03:15 )             22.5     06-07    137  |  97<L>  |  42<H>  ----------------------------<  170<H>  3.7   |  26  |  4.72<H>    Ca    8.5      07 Jun 2024 03:15  Phos  2.0     06-07  Mg     2.30     06-07      PTT - ( 07 Jun 2024 03:15 )  PTT:30.3 sec          Inpatient Medications:  MEDICATIONS  (STANDING):  albuterol    0.083% 2.5 milliGRAM(s) Nebulizer every 6 hours  amLODIPine   Tablet 10 milliGRAM(s) Oral daily  apixaban 5 milliGRAM(s) Enteral Tube every 12 hours  atorvastatin 20 milliGRAM(s) Oral at bedtime  carvedilol 25 milliGRAM(s) Oral every 12 hours  chlorhexidine 0.12% Liquid 15 milliLiter(s) Oral Mucosa every 12 hours  chlorhexidine 2% Cloths 1 Application(s) Topical <User Schedule>  dextrose 10% Bolus 125 milliLiter(s) IV Bolus once  dextrose 5%. 1000 milliLiter(s) (50 mL/Hr) IV Continuous <Continuous>  dextrose 5%. 1000 milliLiter(s) (100 mL/Hr) IV Continuous <Continuous>  dextrose 50% Injectable 12.5 Gram(s) IV Push once  dextrose 50% Injectable 25 Gram(s) IV Push once  epoetin reilly (EPOGEN) Injectable 83002 Unit(s) IV Push <User Schedule>  ferrous    sulfate Liquid 300 milliGRAM(s) Enteral Tube daily  glucagon  Injectable 1 milliGRAM(s) IntraMuscular once  hydrALAZINE 100 milliGRAM(s) Oral three times a day  insulin lispro (ADMELOG) corrective regimen sliding scale   SubCutaneous every 6 hours  insulin NPH human recombinant 4 Unit(s) SubCutaneous every 6 hours  pantoprazole  Injectable 40 milliGRAM(s) IV Push every 12 hours      Assessment: 73 year old man with HTN, HLD, T2DM on insulin, and ESRD on HD presents with supply demand ischemia and angina.    Plan of Care:    #Type II myocardial infarction-  Secondary to distributive shock earlier on admission.   The repeat echo shows no new wall motion abnormality.   I would not pursue cath at this time given debility and chronic respiratory failure s/p trach 5/14.       #HTN-  BP controlled this morning.        #ESRD-  HD as per renal     #DVT   - R common Iliac DVT  AC as per RCU.              Over 55 minutes spent on total encounter; more than 50% of the visit was spent counseling and/or coordinating care by the attending physician.      Geovani Bravo MD [facility]  Cardiovascular Disease  [phone]

## 2024-06-08 LAB
ANION GAP SERPL CALC-SCNC: 13 MMOL/L — SIGNIFICANT CHANGE UP (ref 7–14)
BUN SERPL-MCNC: 21 MG/DL — SIGNIFICANT CHANGE UP (ref 7–23)
CALCIUM SERPL-MCNC: 8.5 MG/DL — SIGNIFICANT CHANGE UP (ref 8.4–10.5)
CHLORIDE SERPL-SCNC: 99 MMOL/L — SIGNIFICANT CHANGE UP (ref 98–107)
CO2 SERPL-SCNC: 27 MMOL/L — SIGNIFICANT CHANGE UP (ref 22–31)
CREAT SERPL-MCNC: 2.77 MG/DL — HIGH (ref 0.5–1.3)
CULTURE RESULTS: ABNORMAL
EGFR: 24 ML/MIN/1.73M2 — LOW
GLUCOSE BLDC GLUCOMTR-MCNC: 160 MG/DL — HIGH (ref 70–99)
GLUCOSE BLDC GLUCOMTR-MCNC: 162 MG/DL — HIGH (ref 70–99)
GLUCOSE BLDC GLUCOMTR-MCNC: 188 MG/DL — HIGH (ref 70–99)
GLUCOSE BLDC GLUCOMTR-MCNC: 251 MG/DL — HIGH (ref 70–99)
GLUCOSE SERPL-MCNC: 134 MG/DL — HIGH (ref 70–99)
HCT VFR BLD CALC: 28.2 % — LOW (ref 39–50)
HGB BLD-MCNC: 8.9 G/DL — LOW (ref 13–17)
MAGNESIUM SERPL-MCNC: 2.1 MG/DL — SIGNIFICANT CHANGE UP (ref 1.6–2.6)
MCHC RBC-ENTMCNC: 23 PG — LOW (ref 27–34)
MCHC RBC-ENTMCNC: 31.6 GM/DL — LOW (ref 32–36)
MCV RBC AUTO: 72.9 FL — LOW (ref 80–100)
NRBC # BLD: 0 /100 WBCS — SIGNIFICANT CHANGE UP (ref 0–0)
NRBC # FLD: 0.03 K/UL — HIGH (ref 0–0)
PHOSPHATE SERPL-MCNC: 2.2 MG/DL — LOW (ref 2.5–4.5)
PLATELET # BLD AUTO: 353 K/UL — SIGNIFICANT CHANGE UP (ref 150–400)
POTASSIUM SERPL-MCNC: 4 MMOL/L — SIGNIFICANT CHANGE UP (ref 3.5–5.3)
POTASSIUM SERPL-SCNC: 4 MMOL/L — SIGNIFICANT CHANGE UP (ref 3.5–5.3)
RBC # BLD: 3.87 M/UL — LOW (ref 4.2–5.8)
RBC # FLD: 24.2 % — HIGH (ref 10.3–14.5)
SODIUM SERPL-SCNC: 139 MMOL/L — SIGNIFICANT CHANGE UP (ref 135–145)
SPECIMEN SOURCE: SIGNIFICANT CHANGE UP
WBC # BLD: 10.22 K/UL — SIGNIFICANT CHANGE UP (ref 3.8–10.5)
WBC # FLD AUTO: 10.22 K/UL — SIGNIFICANT CHANGE UP (ref 3.8–10.5)

## 2024-06-08 PROCEDURE — 99233 SBSQ HOSP IP/OBS HIGH 50: CPT

## 2024-06-08 RX ORDER — POLYETHYLENE GLYCOL 3350 17 G/17G
17 POWDER, FOR SOLUTION ORAL DAILY
Refills: 0 | Status: DISCONTINUED | OUTPATIENT
Start: 2024-06-08 | End: 2024-06-14

## 2024-06-08 RX ORDER — LIDOCAINE 4 G/100G
2 CREAM TOPICAL EVERY 24 HOURS
Refills: 0 | Status: DISCONTINUED | OUTPATIENT
Start: 2024-06-08 | End: 2024-06-14

## 2024-06-08 RX ORDER — SENNA PLUS 8.6 MG/1
2 TABLET ORAL AT BEDTIME
Refills: 0 | Status: DISCONTINUED | OUTPATIENT
Start: 2024-06-08 | End: 2024-06-14

## 2024-06-08 RX ORDER — SODIUM,POTASSIUM PHOSPHATES 278-250MG
1 POWDER IN PACKET (EA) ORAL
Refills: 0 | Status: COMPLETED | OUTPATIENT
Start: 2024-06-08 | End: 2024-06-09

## 2024-06-08 RX ORDER — SODIUM CHLORIDE 9 MG/ML
3 INJECTION INTRAMUSCULAR; INTRAVENOUS; SUBCUTANEOUS EVERY 6 HOURS
Refills: 0 | Status: DISCONTINUED | OUTPATIENT
Start: 2024-06-08 | End: 2024-06-12

## 2024-06-08 RX ADMIN — Medication 100 MILLIGRAM(S): at 05:44

## 2024-06-08 RX ADMIN — SODIUM CHLORIDE 3 MILLILITER(S): 9 INJECTION INTRAMUSCULAR; INTRAVENOUS; SUBCUTANEOUS at 16:32

## 2024-06-08 RX ADMIN — APIXABAN 5 MILLIGRAM(S): 2.5 TABLET, FILM COATED ORAL at 17:01

## 2024-06-08 RX ADMIN — SODIUM CHLORIDE 3 MILLILITER(S): 9 INJECTION INTRAMUSCULAR; INTRAVENOUS; SUBCUTANEOUS at 22:06

## 2024-06-08 RX ADMIN — Medication 100 MILLIGRAM(S): at 11:19

## 2024-06-08 RX ADMIN — Medication 300 MILLIGRAM(S): at 11:19

## 2024-06-08 RX ADMIN — AMLODIPINE BESYLATE 10 MILLIGRAM(S): 2.5 TABLET ORAL at 05:45

## 2024-06-08 RX ADMIN — SENNA PLUS 2 TABLET(S): 8.6 TABLET ORAL at 21:06

## 2024-06-08 RX ADMIN — Medication 6: at 05:46

## 2024-06-08 RX ADMIN — ALBUTEROL 2.5 MILLIGRAM(S): 90 AEROSOL, METERED ORAL at 08:10

## 2024-06-08 RX ADMIN — CHLORHEXIDINE GLUCONATE 1 APPLICATION(S): 213 SOLUTION TOPICAL at 05:46

## 2024-06-08 RX ADMIN — CHLORHEXIDINE GLUCONATE 15 MILLILITER(S): 213 SOLUTION TOPICAL at 05:54

## 2024-06-08 RX ADMIN — HUMAN INSULIN 4 UNIT(S): 100 INJECTION, SUSPENSION SUBCUTANEOUS at 11:24

## 2024-06-08 RX ADMIN — ATORVASTATIN CALCIUM 20 MILLIGRAM(S): 80 TABLET, FILM COATED ORAL at 21:03

## 2024-06-08 RX ADMIN — LIDOCAINE 2 PATCH: 4 CREAM TOPICAL at 19:24

## 2024-06-08 RX ADMIN — LIDOCAINE 2 PATCH: 4 CREAM TOPICAL at 15:24

## 2024-06-08 RX ADMIN — ALBUTEROL 2.5 MILLIGRAM(S): 90 AEROSOL, METERED ORAL at 03:32

## 2024-06-08 RX ADMIN — ALBUTEROL 2.5 MILLIGRAM(S): 90 AEROSOL, METERED ORAL at 22:06

## 2024-06-08 RX ADMIN — Medication 2: at 11:23

## 2024-06-08 RX ADMIN — PANTOPRAZOLE SODIUM 40 MILLIGRAM(S): 20 TABLET, DELAYED RELEASE ORAL at 17:01

## 2024-06-08 RX ADMIN — APIXABAN 5 MILLIGRAM(S): 2.5 TABLET, FILM COATED ORAL at 05:45

## 2024-06-08 RX ADMIN — CHLORHEXIDINE GLUCONATE 15 MILLILITER(S): 213 SOLUTION TOPICAL at 17:01

## 2024-06-08 RX ADMIN — HUMAN INSULIN 4 UNIT(S): 100 INJECTION, SUSPENSION SUBCUTANEOUS at 05:45

## 2024-06-08 RX ADMIN — ALBUTEROL 2.5 MILLIGRAM(S): 90 AEROSOL, METERED ORAL at 16:27

## 2024-06-08 RX ADMIN — Medication 200 MILLIGRAM(S): at 11:19

## 2024-06-08 RX ADMIN — CARVEDILOL PHOSPHATE 25 MILLIGRAM(S): 80 CAPSULE, EXTENDED RELEASE ORAL at 05:45

## 2024-06-08 RX ADMIN — PANTOPRAZOLE SODIUM 40 MILLIGRAM(S): 20 TABLET, DELAYED RELEASE ORAL at 05:45

## 2024-06-08 RX ADMIN — Medication 2: at 17:02

## 2024-06-08 RX ADMIN — HUMAN INSULIN 4 UNIT(S): 100 INJECTION, SUSPENSION SUBCUTANEOUS at 17:02

## 2024-06-08 RX ADMIN — Medication 1 PACKET(S): at 17:00

## 2024-06-08 RX ADMIN — Medication 100 MILLIGRAM(S): at 20:57

## 2024-06-08 NOTE — PROGRESS NOTE ADULT - SUBJECTIVE AND OBJECTIVE BOX
CHIEF COMPLAINT: Patient is a 73y old  Male who presents with a chief complaint of Unstable angina, abn EKG (08 Jun 2024 07:45)      INTERVAL EVENTS:  - Successful HD overnight     REVIEW OF SYSTEMS: Seen by bedside during AM rounds and denies pain          OBJECTIVE:  ICU Vital Signs Last 24 Hrs  T(C): 36.8 (08 Jun 2024 04:00), Max: 37 (07 Jun 2024 17:45)  T(F): 98.3 (08 Jun 2024 04:00), Max: 98.6 (07 Jun 2024 17:45)  HR: 66 (08 Jun 2024 08:08) (60 - 70)  BP: 128/57 (08 Jun 2024 04:00) (124/82 - 154/58)  BP(mean): --  ABP: --  ABP(mean): --  RR: 22 (08 Jun 2024 08:08) (18 - 22)  SpO2: 98% (08 Jun 2024 08:08) (98% - 100%)    O2 Parameters below as of 08 Jun 2024 08:08  Patient On (Oxygen Delivery Method): tracheostomy collar  O2 Flow (L/min): 7  O2 Concentration (%): 99          06-07 @ 07:01  -  06-08 @ 07:00  --------------------------------------------------------  IN: 2080 mL / OUT: 2700 mL / NET: -620 mL      CAPILLARY BLOOD GLUCOSE  POCT Blood Glucose.: 251 mg/dL (08 Jun 2024 05:42)      HOSPITAL MEDICATIONS:  MEDICATIONS  (STANDING):  albuterol    0.083% 2.5 milliGRAM(s) Nebulizer every 6 hours  amLODIPine   Tablet 10 milliGRAM(s) Oral daily  apixaban 5 milliGRAM(s) Enteral Tube every 12 hours  atorvastatin 20 milliGRAM(s) Oral at bedtime  carvedilol 25 milliGRAM(s) Oral every 12 hours  chlorhexidine 0.12% Liquid 15 milliLiter(s) Oral Mucosa two times a day  chlorhexidine 2% Cloths 1 Application(s) Topical <User Schedule>  ciprofloxacin   IVPB 400 milliGRAM(s) IV Intermittent every 24 hours  ciprofloxacin   IVPB      dextrose 10% Bolus 125 milliLiter(s) IV Bolus once  dextrose 5%. 1000 milliLiter(s) (100 mL/Hr) IV Continuous <Continuous>  dextrose 5%. 1000 milliLiter(s) (50 mL/Hr) IV Continuous <Continuous>  dextrose 50% Injectable 25 Gram(s) IV Push once  dextrose 50% Injectable 12.5 Gram(s) IV Push once  epoetin reilly (EPOGEN) Injectable 62934 Unit(s) IV Push <User Schedule>  ferrous    sulfate Liquid 300 milliGRAM(s) Enteral Tube daily  glucagon  Injectable 1 milliGRAM(s) IntraMuscular once  hydrALAZINE 100 milliGRAM(s) Oral three times a day  insulin lispro (ADMELOG) corrective regimen sliding scale   SubCutaneous every 6 hours  insulin NPH human recombinant 4 Unit(s) SubCutaneous every 6 hours  pantoprazole  Injectable 40 milliGRAM(s) IV Push every 12 hours    MEDICATIONS  (PRN):  acetaminophen   Oral Liquid .. 650 milliGRAM(s) Oral every 6 hours PRN Temp greater or equal to 38C (100.4F), Moderate Pain (4 - 6)  dextrose Oral Gel 15 Gram(s) Oral once PRN Blood Glucose LESS THAN 70 milliGRAM(s)/deciliter  hydrALAZINE Injectable 10 milliGRAM(s) IV Push every 8 hours PRN SBP > 180  sodium chloride 0.9% lock flush 10 milliLiter(s) IV Push every 1 hour PRN Pre/post blood products, medications, blood draw, and to maintain line patency      PHYSICAL EXAMINATION  General: NAD   HEENT: Trach with PMV good phonation however copious secretions   Cards: S1/S2, no murmurs   Pulm: CTA bilaterally. No wheezes.   Abdomen: Soft, nondistended and nontender. BS (+) PEG present.   Extremities: No pedal edema. THAI of BL upper and lower extremities with severe weakness   Neurology: AOx3 with no acute focal neurological deficits    LABS:                        8.9    10.22 )-----------( 353      ( 08 Jun 2024 00:06 )             28.2     06-08    139  |  99  |  21  ----------------------------<  134<H>  4.0   |  27  |  2.77<H>    Ca    8.5      08 Jun 2024 00:06  Phos  2.2     06-08  Mg     2.10     06-08      PTT - ( 07 Jun 2024 03:15 )  PTT:30.3 sec    Urinalysis Basic - ( 08 Jun 2024 00:06 )  Color: x / Appearance: x / SG: x / pH: x  Gluc: 134 mg/dL / Ketone: x  / Bili: x / Urobili: x   Blood: x / Protein: x / Nitrite: x   Leuk Esterase: x / RBC: x / WBC x   Sq Epi: x / Non Sq Epi: x / Bacteria: x            PAST MEDICAL & SURGICAL HISTORY:  Diabetes      Benign essential HTN      HLD (hyperlipidemia)      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 CHIEF COMPLAINT: Patient is a 73y old  Male who presents with a chief complaint of Unstable angina, abn EKG (08 Jun 2024 07:45)      INTERVAL EVENTS:  - Successful HD overnight   - Tolerating PMV with thick secretions and cipro continued empirically   - Continue on albuterol and added NS 0.9     REVIEW OF SYSTEMS: Seen by bedside during AM rounds and denies pain          OBJECTIVE:  ICU Vital Signs Last 24 Hrs  T(C): 36.8 (08 Jun 2024 04:00), Max: 37 (07 Jun 2024 17:45)  T(F): 98.3 (08 Jun 2024 04:00), Max: 98.6 (07 Jun 2024 17:45)  HR: 66 (08 Jun 2024 08:08) (60 - 70)  BP: 128/57 (08 Jun 2024 04:00) (124/82 - 154/58)  BP(mean): --  ABP: --  ABP(mean): --  RR: 22 (08 Jun 2024 08:08) (18 - 22)  SpO2: 98% (08 Jun 2024 08:08) (98% - 100%)    O2 Parameters below as of 08 Jun 2024 08:08  Patient On (Oxygen Delivery Method): tracheostomy collar  O2 Flow (L/min): 7  O2 Concentration (%): 99          06-07 @ 07:01  -  06-08 @ 07:00  --------------------------------------------------------  IN: 2080 mL / OUT: 2700 mL / NET: -620 mL      CAPILLARY BLOOD GLUCOSE  POCT Blood Glucose.: 251 mg/dL (08 Jun 2024 05:42)      HOSPITAL MEDICATIONS:  MEDICATIONS  (STANDING):  albuterol    0.083% 2.5 milliGRAM(s) Nebulizer every 6 hours  amLODIPine   Tablet 10 milliGRAM(s) Oral daily  apixaban 5 milliGRAM(s) Enteral Tube every 12 hours  atorvastatin 20 milliGRAM(s) Oral at bedtime  carvedilol 25 milliGRAM(s) Oral every 12 hours  chlorhexidine 0.12% Liquid 15 milliLiter(s) Oral Mucosa two times a day  chlorhexidine 2% Cloths 1 Application(s) Topical <User Schedule>  ciprofloxacin   IVPB 400 milliGRAM(s) IV Intermittent every 24 hours  ciprofloxacin   IVPB      dextrose 10% Bolus 125 milliLiter(s) IV Bolus once  dextrose 5%. 1000 milliLiter(s) (100 mL/Hr) IV Continuous <Continuous>  dextrose 5%. 1000 milliLiter(s) (50 mL/Hr) IV Continuous <Continuous>  dextrose 50% Injectable 25 Gram(s) IV Push once  dextrose 50% Injectable 12.5 Gram(s) IV Push once  epoetin reilly (EPOGEN) Injectable 90345 Unit(s) IV Push <User Schedule>  ferrous    sulfate Liquid 300 milliGRAM(s) Enteral Tube daily  glucagon  Injectable 1 milliGRAM(s) IntraMuscular once  hydrALAZINE 100 milliGRAM(s) Oral three times a day  insulin lispro (ADMELOG) corrective regimen sliding scale   SubCutaneous every 6 hours  insulin NPH human recombinant 4 Unit(s) SubCutaneous every 6 hours  pantoprazole  Injectable 40 milliGRAM(s) IV Push every 12 hours    MEDICATIONS  (PRN):  acetaminophen   Oral Liquid .. 650 milliGRAM(s) Oral every 6 hours PRN Temp greater or equal to 38C (100.4F), Moderate Pain (4 - 6)  dextrose Oral Gel 15 Gram(s) Oral once PRN Blood Glucose LESS THAN 70 milliGRAM(s)/deciliter  hydrALAZINE Injectable 10 milliGRAM(s) IV Push every 8 hours PRN SBP > 180  sodium chloride 0.9% lock flush 10 milliLiter(s) IV Push every 1 hour PRN Pre/post blood products, medications, blood draw, and to maintain line patency      PHYSICAL EXAMINATION  General: NAD   HEENT: Trach with PMV good phonation however copious secretions   Cards: S1/S2, no murmurs   Pulm: CTA bilaterally. No wheezes.   Abdomen: Soft, nondistended and nontender. BS (+) PEG present.   Extremities: No pedal edema. THAI of BL upper and lower extremities with severe weakness   Neurology: AOx3 with no acute focal neurological deficits    LABS:                        8.9    10.22 )-----------( 353      ( 08 Jun 2024 00:06 )             28.2     06-08    139  |  99  |  21  ----------------------------<  134<H>  4.0   |  27  |  2.77<H>    Ca    8.5      08 Jun 2024 00:06  Phos  2.2     06-08  Mg     2.10     06-08      PTT - ( 07 Jun 2024 03:15 )  PTT:30.3 sec    Urinalysis Basic - ( 08 Jun 2024 00:06 )  Color: x / Appearance: x / SG: x / pH: x  Gluc: 134 mg/dL / Ketone: x  / Bili: x / Urobili: x   Blood: x / Protein: x / Nitrite: x   Leuk Esterase: x / RBC: x / WBC x   Sq Epi: x / Non Sq Epi: x / Bacteria: x            PAST MEDICAL & SURGICAL HISTORY:  Diabetes      Benign essential HTN      HLD (hyperlipidemia)      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 CHIEF COMPLAINT: Patient is a 73y old  Male who presents with a chief complaint of Unstable angina, abn EKG (08 Jun 2024 07:45)      INTERVAL EVENTS:  - Successful HD overnight   - Tolerating PMV with thick secretions and cipro continued empirically   - Continue on albuterol and added NS 0.9   - HH responded well to transfusion yesterday     REVIEW OF SYSTEMS: Seen by bedside during AM rounds and denies pain          OBJECTIVE:  ICU Vital Signs Last 24 Hrs  T(C): 36.8 (08 Jun 2024 04:00), Max: 37 (07 Jun 2024 17:45)  T(F): 98.3 (08 Jun 2024 04:00), Max: 98.6 (07 Jun 2024 17:45)  HR: 66 (08 Jun 2024 08:08) (60 - 70)  BP: 128/57 (08 Jun 2024 04:00) (124/82 - 154/58)  BP(mean): --  ABP: --  ABP(mean): --  RR: 22 (08 Jun 2024 08:08) (18 - 22)  SpO2: 98% (08 Jun 2024 08:08) (98% - 100%)    O2 Parameters below as of 08 Jun 2024 08:08  Patient On (Oxygen Delivery Method): tracheostomy collar  O2 Flow (L/min): 7  O2 Concentration (%): 99          06-07 @ 07:01  -  06-08 @ 07:00  --------------------------------------------------------  IN: 2080 mL / OUT: 2700 mL / NET: -620 mL      CAPILLARY BLOOD GLUCOSE  POCT Blood Glucose.: 251 mg/dL (08 Jun 2024 05:42)      HOSPITAL MEDICATIONS:  MEDICATIONS  (STANDING):  albuterol    0.083% 2.5 milliGRAM(s) Nebulizer every 6 hours  amLODIPine   Tablet 10 milliGRAM(s) Oral daily  apixaban 5 milliGRAM(s) Enteral Tube every 12 hours  atorvastatin 20 milliGRAM(s) Oral at bedtime  carvedilol 25 milliGRAM(s) Oral every 12 hours  chlorhexidine 0.12% Liquid 15 milliLiter(s) Oral Mucosa two times a day  chlorhexidine 2% Cloths 1 Application(s) Topical <User Schedule>  ciprofloxacin   IVPB 400 milliGRAM(s) IV Intermittent every 24 hours  ciprofloxacin   IVPB      dextrose 10% Bolus 125 milliLiter(s) IV Bolus once  dextrose 5%. 1000 milliLiter(s) (100 mL/Hr) IV Continuous <Continuous>  dextrose 5%. 1000 milliLiter(s) (50 mL/Hr) IV Continuous <Continuous>  dextrose 50% Injectable 25 Gram(s) IV Push once  dextrose 50% Injectable 12.5 Gram(s) IV Push once  epoetin reilly (EPOGEN) Injectable 85094 Unit(s) IV Push <User Schedule>  ferrous    sulfate Liquid 300 milliGRAM(s) Enteral Tube daily  glucagon  Injectable 1 milliGRAM(s) IntraMuscular once  hydrALAZINE 100 milliGRAM(s) Oral three times a day  insulin lispro (ADMELOG) corrective regimen sliding scale   SubCutaneous every 6 hours  insulin NPH human recombinant 4 Unit(s) SubCutaneous every 6 hours  pantoprazole  Injectable 40 milliGRAM(s) IV Push every 12 hours    MEDICATIONS  (PRN):  acetaminophen   Oral Liquid .. 650 milliGRAM(s) Oral every 6 hours PRN Temp greater or equal to 38C (100.4F), Moderate Pain (4 - 6)  dextrose Oral Gel 15 Gram(s) Oral once PRN Blood Glucose LESS THAN 70 milliGRAM(s)/deciliter  hydrALAZINE Injectable 10 milliGRAM(s) IV Push every 8 hours PRN SBP > 180  sodium chloride 0.9% lock flush 10 milliLiter(s) IV Push every 1 hour PRN Pre/post blood products, medications, blood draw, and to maintain line patency      PHYSICAL EXAMINATION  General: NAD   HEENT: Trach with PMV good phonation however copious secretions   Cards: S1/S2, no murmurs   Pulm: CTA bilaterally. No wheezes.   Abdomen: Soft, nondistended and nontender. BS (+) PEG present.   Extremities: No pedal edema. THAI of BL upper and lower extremities with severe weakness   Neurology: AOx3 with no acute focal neurological deficits    LABS:                        8.9    10.22 )-----------( 353      ( 08 Jun 2024 00:06 )             28.2     06-08    139  |  99  |  21  ----------------------------<  134<H>  4.0   |  27  |  2.77<H>    Ca    8.5      08 Jun 2024 00:06  Phos  2.2     06-08  Mg     2.10     06-08      PTT - ( 07 Jun 2024 03:15 )  PTT:30.3 sec    Urinalysis Basic - ( 08 Jun 2024 00:06 )  Color: x / Appearance: x / SG: x / pH: x  Gluc: 134 mg/dL / Ketone: x  / Bili: x / Urobili: x   Blood: x / Protein: x / Nitrite: x   Leuk Esterase: x / RBC: x / WBC x   Sq Epi: x / Non Sq Epi: x / Bacteria: x            PAST MEDICAL & SURGICAL HISTORY:  Diabetes      Benign essential HTN      HLD (hyperlipidemia)      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

## 2024-06-08 NOTE — PROGRESS NOTE ADULT - NS ATTEND AMEND GEN_ALL_CORE FT
73 year old male with ESRD on HD admitted with demand ischemia and hiccups.  On BP management, baclofen, which led to altered mentation and 73 year old male with ESRD on HD admitted with demand ischemia and hiccups.  Course was complicated by altered mentation and CVA.  He is trached, ventilated and status post PEG.  He also had DVT, bactermeia, and AV graft stenosis, repaired.  Underswent HD through graft last night.  He is weaned off the vent to trach collar.  Has copious secretions and is being treated empirically with cipro for tracheitis.  Eventually will go to rehab.  He is HD stable, afebrile.  Agree with plan as outlined above.

## 2024-06-08 NOTE — PROGRESS NOTE ADULT - SUBJECTIVE AND OBJECTIVE BOX
Patient is a 73y Male     Patient is a 73y old  Male who presents with a chief complaint of Unstable angina, abn EKG (08 Jun 2024 09:13)      HPI:  73-year-old male past medical history hypertension, ESRD on dialysis Monday Wednesday Friday, diabetes insulin-dependent presents with hiccups patient endorses persistent hiccups for the last 2 days. found to have peaked T waves, a new finding. denies dizziness, N/V, denies CP, palps, abd pain (29 Apr 2024 21:15)      PAST MEDICAL & SURGICAL HISTORY:  Diabetes      Benign essential HTN      HLD (hyperlipidemia)      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      ESRD on hemodialysis      Arteriovenous fistula          MEDICATIONS  (STANDING):  albuterol    0.083% 2.5 milliGRAM(s) Nebulizer every 6 hours  amLODIPine   Tablet 10 milliGRAM(s) Oral daily  apixaban 5 milliGRAM(s) Enteral Tube every 12 hours  atorvastatin 20 milliGRAM(s) Oral at bedtime  carvedilol 25 milliGRAM(s) Oral every 12 hours  chlorhexidine 0.12% Liquid 15 milliLiter(s) Oral Mucosa two times a day  chlorhexidine 2% Cloths 1 Application(s) Topical <User Schedule>  ciprofloxacin   IVPB 400 milliGRAM(s) IV Intermittent every 24 hours  ciprofloxacin   IVPB      dextrose 10% Bolus 125 milliLiter(s) IV Bolus once  dextrose 5%. 1000 milliLiter(s) (100 mL/Hr) IV Continuous <Continuous>  dextrose 5%. 1000 milliLiter(s) (50 mL/Hr) IV Continuous <Continuous>  dextrose 50% Injectable 25 Gram(s) IV Push once  dextrose 50% Injectable 12.5 Gram(s) IV Push once  epoetin reilly (EPOGEN) Injectable 41089 Unit(s) IV Push <User Schedule>  ferrous    sulfate Liquid 300 milliGRAM(s) Enteral Tube daily  glucagon  Injectable 1 milliGRAM(s) IntraMuscular once  hydrALAZINE 100 milliGRAM(s) Oral three times a day  insulin lispro (ADMELOG) corrective regimen sliding scale   SubCutaneous every 6 hours  insulin NPH human recombinant 4 Unit(s) SubCutaneous every 6 hours  pantoprazole  Injectable 40 milliGRAM(s) IV Push every 12 hours      Allergies    No Known Allergies    Intolerances        SOCIAL HISTORY:  Denies ETOh,Smoking,     FAMILY HISTORY:  FHx: diabetes mellitus (Father, Aunt)        REVIEW OF SYSTEMS:    CONSTITUTIONAL: No weakness, fevers or chills  EYES/ENT: No visual changes;  No vertigo or throat pain   NECK: No pain or stiffness  RESPIRATORY: No cough, wheezing, hemoptysis; No shortness of breath  CARDIOVASCULAR: No chest pain or palpitations  GASTROINTESTINAL: No abdominal or epigastric pain. No nausea, vomiting, or hematemesis; No diarrhea or constipation. No melena or hematochezia.  GENITOURINARY: No dysuria, frequency or hematuria  NEUROLOGICAL: No numbness or weakness  SKIN: No itching, burning, rashes, or lesions   All other review of systems is negative unless indicated above.    VITAL:  T(C): , Max: 37 (06-07-24 @ 17:45)  T(F): , Max: 98.6 (06-07-24 @ 17:45)  HR: 66 (06-08-24 @ 08:08)  BP: 128/57 (06-08-24 @ 04:00)  BP(mean): --  RR: 22 (06-08-24 @ 08:08)  SpO2: 98% (06-08-24 @ 08:08)  Wt(kg): --    I and O's:    06-06 @ 07:01  -  06-07 @ 07:00  --------------------------------------------------------  IN: 1280 mL / OUT: 0 mL / NET: 1280 mL    06-07 @ 07:01  -  06-08 @ 07:00  --------------------------------------------------------  IN: 2080 mL / OUT: 2700 mL / NET: -620 mL          PHYSICAL EXAM:    Constitutional: NAD  HEENT: PERRLA,   Neck: No JVD  Respiratory: CTA B/L  Cardiovascular: S1 and S2  Gastrointestinal: BS+, soft, NT/ND  Extremities: No peripheral edema  Neurological: A/O x 3, no focal deficits  Psychiatric: Normal mood, normal affect  : No Abbasi  Skin: No rashes  Access: Not applicable  Back: No CVA tenderness    LABS:                        8.9    10.22 )-----------( 353      ( 08 Jun 2024 00:06 )             28.2     06-08    139  |  99  |  21  ----------------------------<  134<H>  4.0   |  27  |  2.77<H>    Ca    8.5      08 Jun 2024 00:06  Phos  2.2     06-08  Mg     2.10     06-08            RADIOLOGY & ADDITIONAL STUDIES:

## 2024-06-08 NOTE — PROGRESS NOTE ADULT - ASSESSMENT
74 YO M with PMHx of ESRD on HD MWF, HTN, and 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 Baclofen started and course complicated by AMS second to baclofen toxicity requiring intubation and MICU transfer. Course further complicated by LEFT pontine and cerebellar stroke, R AVF stenosis, aspiration and B Cereus bacteremia and RLE DVT. s/p trach and transferred to RCU 5/16 and course complicated by intermittent fever spikes with likely aspiration citrobacter PNA, AOCD, and GIB vs Fe stools. s/p fistulogram 6/4 and able to wean off vent to TC ATC.     NEUROLOGY  # AMS   - MRI HEAD (5/6) with punctate foci in the left talya and left cerebellum with concern for acute infarct.   - MRA HEAD and NECK (5/6) with no large vessel occlusion or stenosis.  - Continue on Lipitor for medical management     PSYCH   # Depression   - Concern for depression with questionable SI   - CO started, however formal discussion and evaluation with no true SI   - Supportive care and encouragement QD    CARDIOVASCULAR  # CP with NSTEMI < demand ischemia with HTN   - Admitted for CP and HTN with peaked T WAVES and ECG with ST deviation on admission   - Troponins mildly elevated on admission with negative CKMB   - TTE (4/30) with EF 72, normal LVRVSF, and no regional wall motion abnormalities   - No need to pursue cath at this time     # Septic vs vasoplegic shock  # Hx of HTN   - Home BP medications held and pressors weaned off   - Continue on coreg 25 (increased 6/1), hydralazine 100 TID and Norvasc 10mg (increased 5/31)   - Monitor blood pressures with Hydralazine 10mg IVP Q8H PRN   - IF remains hypertensive then add Isordil     RESPIRATORY  # Respiratory failure   - AMS noted with baclofen toxicity requiring intubation   - Attempted extubation in MICU however course complicated by aspiration and prolonged course and now s/p tracheostomy with IP on 5/14  - Continued on vent 12/500/5/30 and weaned to TC ATC   - Continue on Albuterol with TC ATC (5/30 - )   - Able to tolerate PMV during the day with good phonation   - Copious secretions but hold HTS as strong cough     GI  # Dysphagia   - s/p surgical PEG 5/17   - Failed green dye on 6/1 and continue on tube feeds via PEG   - Continue with PMV during the day this week and reattempt SS sometime next week.     # GIB  - Noted with several episodes of black stool with drop in Hgb (5/26 overnight) requiring PRBC with appropriate response  - Case discussed with GI, questionable anemia from AOCD with ESRD and black stool likely from iron, and no plan for scope at this time  - Continue on PPI and monitor stools    RENAL  # ESRD on HD MWF with R BCF  # Fistula stenosis   - VA AVF (5/2) with Right radiocephalic arteriovenous fistula has no hemodynamically significant stenosis present at the anastomotic site ( cm/sec, EDV 49 cm/sec). The usable segment is partially thrombosed with minimal patency.  - Required R FEMORAL line on medicine, then removed on 5/2, and replaced with R IJ SHILEY on 5/3 for CRRT then converted back to iHD MWF   - R IJ SHILEY removed on 5/10 second to bacteremia and replaced on 5/13  - R IJ SHILEY removed 5/27 given intermittent fevers    - Blood cultures negative and replaced SHILEY on 6/1   - Vein mapping with no adequate veins in UE for conduit (all <2 mm and/or thrombosed) and pending possible revision of RUE AVF.   - s/p RUE Fistuloplasty on 6/4   - s/p Successfully HD via RUE AVF on 6/5  - s/p R IJ Shiley removed 6/6  - Continue on HD MWF per Renal     # Hyperphosphatemia   - Renvela 1600 however phos corrects well with HD   - Monitor off Renvela     INFECTIOUS DISEASE  # Citrobacter aspiration vs trachitis concern   - Recurrent fever spike and CXR with RLL opacity   - BCx (5/25 and 5/30) negative   - SCx (5/25) negative   - SCx (5/30) with citrobacter and completed Zosyn empirically (5/18 - 5/28)  - Thick secretions without fever and RPT SCx negative, however will empirically tx with cipro (6/7 - )     # B Cereus Bacteremia   - Course complicated by fevers and aspiration event   - MRSA (5/1) negative   - BCx (5/2) negative   - SCx (5/4) and BAL (5/12) negative   - BCx (5/10) with bacillus cereus and cleared on (5/12).   - Case discussed with ID and s/p Vancomycin through 5/21 x 10 day course.   - Continue to monitor off antibiotics per ID     HEME  # AOCD with ESRD   - Anemia panel with AOCD and low % sat   - EPO 10K continued on TIW   - Ferrous supplement  - Transfused on 5/16 and 5/26 and will monitor HH     VASCULAR   # RLE DVT   - CT AP (5/12) with nonocclusive RIGHT common iliac vein deep venous thrombosis and case discussed with IR with no plans for IVC filter.   - RLE duplex 5/28 with no evidence of DVT  - Initially started on a Heparin GTT with course complicated by questionable GIB given dark tarry stools, however more likely second to iron tabs.   - Discontinued heparin GTT 6/6 and transitioned to Eliquis    ENDOCRINE  # IDDM2 A1C 6.9  - c/w NPH 4U Q6 and ISS  - Monitor and adjust insulin based on FS     SKIN  - R FEMORAL (5/1-5/2)   - R IJ SHILEY (5/3-5/10)   - R IJ SHILEY (5/13 - 5/27)   - R IJ SHILEY (6/1 - 6/6)     ETHICS/ GOC    - FULL CODE     DISPO - PMR recc ACUTE   74 YO M with PMHx of ESRD on HD MWF, HTN, and 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 Baclofen started and course complicated by AMS second to baclofen toxicity requiring intubation and MICU transfer. Course further complicated by LEFT pontine and cerebellar stroke, R AVF stenosis, aspiration and B Cereus bacteremia and RLE DVT. s/p trach and transferred to RCU 5/16 and course complicated by intermittent fever spikes with likely aspiration citrobacter PNA, AOCD, and GIB vs Fe stools. s/p fistulogram 6/4 and able to wean off vent to TC ATC.     NEUROLOGY  # AMS   - MRI HEAD (5/6) with punctate foci in the left talya and left cerebellum with concern for acute infarct.   - MRA HEAD and NECK (5/6) with no large vessel occlusion or stenosis.  - Continue on Lipitor for medical management     PSYCH   # Depression   - Concern for depression with questionable SI   - CO started, however formal discussion and evaluation with no true SI   - Supportive care and encouragement QD    CARDIOVASCULAR  # CP with NSTEMI < demand ischemia with HTN   - Admitted for CP and HTN with peaked T WAVES and ECG with ST deviation on admission   - Troponins mildly elevated on admission with negative CKMB   - TTE (4/30) with EF 72, normal LVRVSF, and no regional wall motion abnormalities   - No need to pursue cath at this time     # Septic vs vasoplegic shock  # Hx of HTN   - Home BP medications held and pressors weaned off   - Continue on coreg 25 (increased 6/1), hydralazine 100 TID and Norvasc 10mg (increased 5/31)   - Monitor blood pressures with Hydralazine 10mg IVP Q8H PRN   - IF remains hypertensive then add Isordil     RESPIRATORY  # Respiratory failure   - AMS noted with baclofen toxicity requiring intubation   - Attempted extubation in MICU however course complicated by aspiration and prolonged course and now s/p tracheostomy with IP on 5/14  - Continued on vent 12/500/5/30 and weaned to TC ATC   - Continue on TC ATC (5/30 - )   - Able to tolerate PMV during the day with good phonation   - Copious secretions this secretions but hold HTS as strong cough and continue on albuterol and NS 0.9%    GI  # Dysphagia   - s/p surgical PEG 5/17   - Failed green dye on 6/1 and continue on tube feeds via PEG   - Continue with PMV during the day this week and reattempt SS sometime next week.     # GIB  - Noted with several episodes of black stool with drop in Hgb (5/26 overnight) requiring PRBC with appropriate response  - Case discussed with GI, questionable anemia from AOCD with ESRD and black stool likely from iron, and no plan for scope at this time  - Continue on PPI and monitor stools    RENAL  # ESRD on HD MWF with R BCF  # Fistula stenosis   - VA AVF (5/2) with Right radiocephalic arteriovenous fistula has no hemodynamically significant stenosis present at the anastomotic site ( cm/sec, EDV 49 cm/sec). The usable segment is partially thrombosed with minimal patency.  - Required R FEMORAL line on medicine, then removed on 5/2, and replaced with R IJ SHILEY on 5/3 for CRRT then converted back to iHD MWF   - R IJ SHILEY removed on 5/10 second to bacteremia and replaced on 5/13  - R IJ SHILEY removed 5/27 given intermittent fevers    - Blood cultures negative and replaced SHILEY on 6/1   - Vein mapping with no adequate veins in UE for conduit (all <2 mm and/or thrombosed) and pending possible revision of RUE AVF.   - s/p RUE Fistuloplasty on 6/4   - s/p Successfully HD via RUE AVF on 6/5  - s/p R IJ Shiley removed 6/6  - Continue on HD MWF per Renal     # Hyperphosphatemia   - Renvela 1600 however phos corrects well with HD   - Monitor off Renvela     INFECTIOUS DISEASE  # Citrobacter aspiration vs trachitis concern   - Recurrent fever spike and CXR with RLL opacity   - BCx (5/25 and 5/30) negative   - SCx (5/25) negative   - SCx (5/30) with citrobacter and completed Zosyn empirically (5/18 - 5/28)  - Thick secretions without fever and RPT SCx negative, however will empirically tx with cipro (6/7 - )     # B Cereus Bacteremia   - Course complicated by fevers and aspiration event   - MRSA (5/1) negative   - BCx (5/2) negative   - SCx (5/4) and BAL (5/12) negative   - BCx (5/10) with bacillus cereus and cleared on (5/12).   - Case discussed with ID and s/p Vancomycin through 5/21 x 10 day course.   - Continue to monitor off antibiotics per ID     HEME  # AOCD with ESRD   - Anemia panel with AOCD and low % sat   - EPO 10K continued on TIW   - Ferrous supplement  - Transfused on 5/16 and 5/26 and will monitor HH     VASCULAR   # RLE DVT   - CT AP (5/12) with nonocclusive RIGHT common iliac vein deep venous thrombosis and case discussed with IR with no plans for IVC filter.   - RLE duplex 5/28 with no evidence of DVT  - Initially started on a Heparin GTT with course complicated by questionable GIB given dark tarry stools, however more likely second to iron tabs.   - Discontinued heparin GTT 6/6 and transitioned to Eliquis    ENDOCRINE  # IDDM2 A1C 6.9  - c/w NPH 4U Q6 and ISS  - Monitor and adjust insulin based on FS     SKIN  - R FEMORAL (5/1-5/2)   - R IJ SHILEY (5/3-5/10)   - R IJ SHILEY (5/13 - 5/27)   - R IJ SHILEY (6/1 - 6/6)     ETHICS/ GOC    - FULL CODE     DISPO - PMR recc ACUTE   74 YO M with PMHx of ESRD on HD MWF, HTN, and 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 Baclofen started and course complicated by AMS second to baclofen toxicity requiring intubation and MICU transfer. Course further complicated by LEFT pontine and cerebellar stroke, R AVF stenosis, aspiration and B Cereus bacteremia and RLE DVT. s/p trach and transferred to RCU 5/16 and course complicated by intermittent fever spikes with likely aspiration citrobacter PNA, AOCD, and GIB vs Fe stools. s/p fistulogram 6/4 and able to wean off vent to TC ATC.     NEUROLOGY  # AMS   - MRI HEAD (5/6) with punctate foci in the left talya and left cerebellum with concern for acute infarct.   - MRA HEAD and NECK (5/6) with no large vessel occlusion or stenosis.  - Continue on Lipitor for medical management     PSYCH   # Depression   - Concern for depression with questionable SI   - CO started, however formal discussion and evaluation with no true SI   - Supportive care and encouragement QD    CARDIOVASCULAR  # CP with NSTEMI < demand ischemia with HTN   - Admitted for CP and HTN with peaked T WAVES and ECG with ST deviation on admission   - Troponins mildly elevated on admission with negative CKMB   - TTE (4/30) with EF 72, normal LVRVSF, and no regional wall motion abnormalities   - No need to pursue cath at this time     # Septic vs vasoplegic shock  # Hx of HTN   - Home BP medications held and pressors weaned off   - Continue on coreg 25 (increased 6/1), hydralazine 100 TID and Norvasc 10mg (increased 5/31)   - Monitor blood pressures with Hydralazine 10mg IVP Q8H PRN   - IF remains hypertensive then add Isordil     RESPIRATORY  # Respiratory failure   - AMS noted with baclofen toxicity requiring intubation   - Attempted extubation in MICU however course complicated by aspiration and prolonged course and now s/p tracheostomy with IP on 5/14  - Continued on vent 12/500/5/30 and weaned to TC ATC   - Continue on TC ATC (5/30 - )   - Able to tolerate PMV during the day with good phonation   - Copious secretions this secretions but hold HTS as strong cough and continue on albuterol and NS 0.9%    GI  # Dysphagia   - s/p surgical PEG 5/17   - Failed green dye on 6/1 and continue on tube feeds via PEG   - Continue with PMV during the day this week and reattempt SS sometime next week.     # GIB  - Noted with several episodes of black stool with drop in Hgb (5/26 overnight) requiring PRBC with appropriate response  - Case discussed with GI, questionable anemia from AOCD with ESRD and black stool likely from iron, and no plan for scope at this time  - Continue on PPI and monitor stools    RENAL  # ESRD on HD MWF with R BCF  # Fistula stenosis   - VA AVF (5/2) with Right radiocephalic arteriovenous fistula has no hemodynamically significant stenosis present at the anastomotic site ( cm/sec, EDV 49 cm/sec). The usable segment is partially thrombosed with minimal patency.  - Required R FEMORAL line on medicine, then removed on 5/2, and replaced with R IJ SHILEY on 5/3 for CRRT then converted back to iHD MWF   - R IJ SHILEY removed on 5/10 second to bacteremia and replaced on 5/13  - R IJ SHILEY removed 5/27 given intermittent fevers    - Blood cultures negative and replaced SHILEY on 6/1   - Vein mapping with no adequate veins in UE for conduit (all <2 mm and/or thrombosed) and pending possible revision of RUE AVF.   - s/p RUE Fistuloplasty on 6/4   - s/p Successfully HD via RUE AVF on 6/5  - s/p R IJ Shiley removed 6/6  - Continue on HD MWF per Renal     # Hyperphosphatemia   - Renvela 1600 however phos corrects well with HD   - Monitor off Renvela     INFECTIOUS DISEASE  # Citrobacter aspiration vs trachitis concern   - Recurrent fever spike and CXR with RLL opacity   - BCx (5/25 and 5/30) negative   - SCx (5/25) negative   - SCx (5/30) with citrobacter and completed Zosyn empirically (5/18 - 5/28)  - Thick secretions without fever and RPT SCx negative, however will empirically tx with cipro (6/7 - )     # B Cereus Bacteremia   - Course complicated by fevers and aspiration event   - MRSA (5/1) negative   - BCx (5/2) negative   - SCx (5/4) and BAL (5/12) negative   - BCx (5/10) with bacillus cereus and cleared on (5/12).   - Case discussed with ID and s/p Vancomycin through 5/21 x 10 day course.   - Continue to monitor off antibiotics per ID     HEME  # AOCD with ESRD   - Anemia panel with AOCD and low % sat   - Continue on EPO 10K and Ferrous supplement  - s/p PRBC on 5/16, 5/26 and 6/7 and will monitor HH     VASCULAR   # RLE DVT   - CT AP (5/12) with nonocclusive RIGHT common iliac vein deep venous thrombosis and case discussed with IR with no plans for IVC filter.   - RLE duplex 5/28 with no evidence of DVT  - Initially started on a Heparin GTT with course complicated by questionable GIB given dark tarry stools, however more likely second to iron tabs.   - Discontinued heparin GTT 6/6 and transitioned to Eliquis    ENDOCRINE  # IDDM2 A1C 6.9  - c/w NPH 4U Q6 and ISS  - Monitor and adjust insulin based on FS     SKIN  - R FEMORAL (5/1-5/2)   - R IJ SHILEY (5/3-5/10)   - R IJ SHILEY (5/13 - 5/27)   - R IJ SHILEY (6/1 - 6/6)     ETHICS/ GOC    - FULL CODE     DISPO - PMR recc ACUTE

## 2024-06-08 NOTE — PROGRESS NOTE ADULT - SUBJECTIVE AND OBJECTIVE BOX
Cardiovascular Disease Progress Note  DATE OF SERVICE: 24 @ 07:45    Overnight events: No acute events overnight.    Mr. Art is in no distress on trach collar.   Otherwise review of systems negative    Objective Findings:  T(C): 36.8 (24 @ 04:00), Max: 37 (24 @ 17:45)  HR: 66 (24 @ 04:00) (59 - 70)  BP: 128/57 (24 @ 04:00) (124/82 - 154/58)  RR: 20 (24 @ 04:00) (16 - 20)  SpO2: 100% (24 @ 04:00) (98% - 100%)  Wt(kg): --  Daily     Daily Weight in k.4 (2024 21:20)      Physical Exam:  Gen: NAD; Patient resting comfortably  HEENT:  Normocephalic/ atraumatic  CV: RRR, normal S1 + S2, no m/r/g  Lungs:  Normal respiratory effort; clear to auscultation bilaterally  Abd: soft, non-tender; bowel sounds present  Ext: No edema; warm and well perfused    Telemetry: n/a    Laboratory Data:                        8.9    10.22 )-----------( 353      ( 2024 00:06 )             28.2     06-    139  |  99  |  21  ----------------------------<  134<H>  4.0   |  27  |  2.77<H>    Ca    8.5      2024 00:06  Phos  2.2     06-08  Mg     2.10     06-08      PTT - ( 2024 03:15 )  PTT:30.3 sec          Inpatient Medications:  MEDICATIONS  (STANDING):  albuterol    0.083% 2.5 milliGRAM(s) Nebulizer every 6 hours  amLODIPine   Tablet 10 milliGRAM(s) Oral daily  apixaban 5 milliGRAM(s) Enteral Tube every 12 hours  atorvastatin 20 milliGRAM(s) Oral at bedtime  carvedilol 25 milliGRAM(s) Oral every 12 hours  chlorhexidine 0.12% Liquid 15 milliLiter(s) Oral Mucosa two times a day  chlorhexidine 2% Cloths 1 Application(s) Topical <User Schedule>  ciprofloxacin   IVPB 400 milliGRAM(s) IV Intermittent every 24 hours  ciprofloxacin   IVPB      dextrose 10% Bolus 125 milliLiter(s) IV Bolus once  dextrose 5%. 1000 milliLiter(s) (100 mL/Hr) IV Continuous <Continuous>  dextrose 5%. 1000 milliLiter(s) (50 mL/Hr) IV Continuous <Continuous>  dextrose 50% Injectable 25 Gram(s) IV Push once  dextrose 50% Injectable 12.5 Gram(s) IV Push once  epoetin reilly (EPOGEN) Injectable 71685 Unit(s) IV Push <User Schedule>  ferrous    sulfate Liquid 300 milliGRAM(s) Enteral Tube daily  glucagon  Injectable 1 milliGRAM(s) IntraMuscular once  hydrALAZINE 100 milliGRAM(s) Oral three times a day  insulin lispro (ADMELOG) corrective regimen sliding scale   SubCutaneous every 6 hours  insulin NPH human recombinant 4 Unit(s) SubCutaneous every 6 hours  pantoprazole  Injectable 40 milliGRAM(s) IV Push every 12 hours      Assessment: 73 year old man with HTN, HLD, T2DM on insulin, and ESRD on HD presents with supply demand ischemia and angina.    Plan of Care:    #Type II myocardial infarction-  Secondary to distributive shock earlier on admission.   The repeat echo shows no new wall motion abnormality.   I would not pursue cath at this time given debility and chronic respiratory failure s/p trach .       #HTN-  BP controlled this morning.        #ESRD-  HD as per renal     #DVT   - R common Iliac DVT  AC as per RCU.                   Over 55 minutes spent on total encounter; more than 50% of the visit was spent counseling and/or coordinating care by the attending physician.      Geovani Bravo MD [facility]  Cardiovascular Disease  [phone]

## 2024-06-08 NOTE — PROGRESS NOTE ADULT - SUBJECTIVE AND OBJECTIVE BOX
Patient is a 73y old  Male who presents with a chief complaint of Unstable angina, abn EKG (08 Jun 2024 15:03)      SUBJECTIVE / OVERNIGHT EVENTS: s/p transfusion w PRBC, s/p HD. on Cipro 2/2 copious secretions    MEDICATIONS  (STANDING):  albuterol    0.083% 2.5 milliGRAM(s) Nebulizer every 6 hours  amLODIPine   Tablet 10 milliGRAM(s) Oral daily  apixaban 5 milliGRAM(s) Enteral Tube every 12 hours  atorvastatin 20 milliGRAM(s) Oral at bedtime  carvedilol 25 milliGRAM(s) Oral every 12 hours  chlorhexidine 0.12% Liquid 15 milliLiter(s) Oral Mucosa two times a day  chlorhexidine 2% Cloths 1 Application(s) Topical <User Schedule>  ciprofloxacin   IVPB 400 milliGRAM(s) IV Intermittent every 24 hours  ciprofloxacin   IVPB      dextrose 10% Bolus 125 milliLiter(s) IV Bolus once  dextrose 5%. 1000 milliLiter(s) (100 mL/Hr) IV Continuous <Continuous>  dextrose 5%. 1000 milliLiter(s) (50 mL/Hr) IV Continuous <Continuous>  dextrose 50% Injectable 25 Gram(s) IV Push once  dextrose 50% Injectable 12.5 Gram(s) IV Push once  epoetin reilly (EPOGEN) Injectable 41442 Unit(s) IV Push <User Schedule>  ferrous    sulfate Liquid 300 milliGRAM(s) Enteral Tube daily  glucagon  Injectable 1 milliGRAM(s) IntraMuscular once  hydrALAZINE 100 milliGRAM(s) Oral three times a day  insulin lispro (ADMELOG) corrective regimen sliding scale   SubCutaneous every 6 hours  insulin NPH human recombinant 4 Unit(s) SubCutaneous every 6 hours  lidocaine   4% Patch 2 Patch Transdermal every 24 hours  pantoprazole  Injectable 40 milliGRAM(s) IV Push every 12 hours  polyethylene glycol 3350 17 Gram(s) Oral daily  potassium phosphate / sodium phosphate Powder (PHOS-NaK) 1 Packet(s) Oral two times a day  senna 2 Tablet(s) Oral at bedtime  sodium chloride 0.9% for Nebulization 3 milliLiter(s) Nebulizer every 6 hours    MEDICATIONS  (PRN):  acetaminophen   Oral Liquid .. 650 milliGRAM(s) Oral every 6 hours PRN Temp greater or equal to 38C (100.4F), Moderate Pain (4 - 6)  dextrose Oral Gel 15 Gram(s) Oral once PRN Blood Glucose LESS THAN 70 milliGRAM(s)/deciliter  hydrALAZINE Injectable 10 milliGRAM(s) IV Push every 8 hours PRN SBP > 180  sodium chloride 0.9% lock flush 10 milliLiter(s) IV Push every 1 hour PRN Pre/post blood products, medications, blood draw, and to maintain line patency      Vital Signs Last 24 Hrs  T(F): 98 (06-08-24 @ 16:56), Max: 98.9 (06-08-24 @ 11:16)  HR: 57 (06-08-24 @ 22:09) (57 - 72)  BP: 142/63 (06-08-24 @ 16:56) (128/57 - 154/58)  RR: 18 (06-08-24 @ 22:09) (18 - 22)  SpO2: 97% (06-08-24 @ 22:09) (97% - 100%)  Telemetry:   CAPILLARY BLOOD GLUCOSE      POCT Blood Glucose.: 162 mg/dL (08 Jun 2024 16:58)  POCT Blood Glucose.: 188 mg/dL (08 Jun 2024 11:04)  POCT Blood Glucose.: 251 mg/dL (08 Jun 2024 05:42)  POCT Blood Glucose.: 116 mg/dL (07 Jun 2024 23:24)    I&O's Summary    07 Jun 2024 07:01  -  08 Jun 2024 07:00  --------------------------------------------------------  IN: 2080 mL / OUT: 2700 mL / NET: -620 mL    08 Jun 2024 07:01  -  08 Jun 2024 23:18  --------------------------------------------------------  IN: 790 mL / OUT: 0 mL / NET: 790 mL        PHYSICAL EXAM:  GENERAL: NAD, well-developed  HEAD:  Atraumatic, Normocephalic  EYES: EOMI, PERRLA, conjunctiva and sclera clear  NECK: Supple, No JVD  CHEST/LUNG: Clear to auscultation bilaterally; No wheeze  HEART: Regular rate and rhythm; No murmurs, rubs, or gallops  ABDOMEN: Soft, Nontender, Nondistended; Bowel sounds present  EXTREMITIES:  2+ Peripheral Pulses, No clubbing, cyanosis, or edema  PSYCH: AAOx3  NEUROLOGY: non-focal  SKIN: No rashes or lesions    LABS:                        8.9    10.22 )-----------( 353      ( 08 Jun 2024 00:06 )             28.2     06-08    139  |  99  |  21  ----------------------------<  134<H>  4.0   |  27  |  2.77<H>    Ca    8.5      08 Jun 2024 00:06  Phos  2.2     06-08  Mg     2.10     06-08      PTT - ( 07 Jun 2024 03:15 )  PTT:30.3 sec      Urinalysis Basic - ( 08 Jun 2024 00:06 )    Color: x / Appearance: x / SG: x / pH: x  Gluc: 134 mg/dL / Ketone: x  / Bili: x / Urobili: x   Blood: x / Protein: x / Nitrite: x   Leuk Esterase: x / RBC: x / WBC x   Sq Epi: x / Non Sq Epi: x / Bacteria: x        RADIOLOGY & ADDITIONAL TESTS:    Imaging Personally Reviewed:    Consultant(s) Notes Reviewed:      Care Discussed with Consultants/Other Providers:

## 2024-06-08 NOTE — PROGRESS NOTE ADULT - SUBJECTIVE AND OBJECTIVE BOX
[facility] [place] [facility]   MD ELIANE BHAGAT MD MARIA SANTIAGO, NP    TEL:  OFFICE: [phone]  From 5pm-7am Answering Service [phone]    -- RENAL FOLLOW UP NOTE ---Date of Service 06-08-24 @ 15:04    Patient is a 73y old  Male who presents with a chief complaint of Unstable angina, abn EKG      Patient seen and examined at bedside. Patient with trach in no apparent distress    VITALS:  T(F): 98.9 (06-08-24 @ 11:16), Max: 98.9 (06-08-24 @ 11:16)  HR: 65 (06-08-24 @ 11:16)  BP: 132/58 (06-08-24 @ 11:16)  RR: 20 (06-08-24 @ 11:16)  SpO2: 100% (06-08-24 @ 11:16)  Wt(kg): --    06-07 @ 07:01  -  06-08 @ 07:00  --------------------------------------------------------  IN: 2080 mL / OUT: 2700 mL / NET: -620 mL    06-08 @ 07:01  -  06-08 @ 15:04  --------------------------------------------------------  IN: 100 mL / OUT: 0 mL / NET: 100 mL          PHYSICAL EXAM:  General: NAD  Neck: No JVD  Respiratory: +trach  Cardiovascular: S1, S2, RRR  Gastrointestinal: BS+, soft, NT/ND  Extremities: No peripheral edema    Hospital Medications:   MEDICATIONS  (STANDING):  albuterol    0.083% 2.5 milliGRAM(s) Nebulizer every 6 hours  amLODIPine   Tablet 10 milliGRAM(s) Oral daily  apixaban 5 milliGRAM(s) Enteral Tube every 12 hours  atorvastatin 20 milliGRAM(s) Oral at bedtime  carvedilol 25 milliGRAM(s) Oral every 12 hours  chlorhexidine 0.12% Liquid 15 milliLiter(s) Oral Mucosa two times a day  chlorhexidine 2% Cloths 1 Application(s) Topical <User Schedule>  ciprofloxacin   IVPB 400 milliGRAM(s) IV Intermittent every 24 hours  ciprofloxacin   IVPB      dextrose 10% Bolus 125 milliLiter(s) IV Bolus once  dextrose 5%. 1000 milliLiter(s) (100 mL/Hr) IV Continuous <Continuous>  dextrose 5%. 1000 milliLiter(s) (50 mL/Hr) IV Continuous <Continuous>  dextrose 50% Injectable 25 Gram(s) IV Push once  dextrose 50% Injectable 12.5 Gram(s) IV Push once  epoetin reilly (EPOGEN) Injectable 04658 Unit(s) IV Push <User Schedule>  ferrous    sulfate Liquid 300 milliGRAM(s) Enteral Tube daily  glucagon  Injectable 1 milliGRAM(s) IntraMuscular once  hydrALAZINE 100 milliGRAM(s) Oral three times a day  insulin lispro (ADMELOG) corrective regimen sliding scale   SubCutaneous every 6 hours  insulin NPH human recombinant 4 Unit(s) SubCutaneous every 6 hours  pantoprazole  Injectable 40 milliGRAM(s) IV Push every 12 hours  sodium chloride 0.9% for Nebulization 3 milliLiter(s) Nebulizer every 6 hours      LABS:  06-08    139  |  99  |  21  ----------------------------<  134<H>  4.0   |  27  |  2.77<H>    Ca    8.5      08 Jun 2024 00:06  Phos  2.2     06-08  Mg     2.10     06-08      Creatinine Trend: 2.77 <--, 4.72 <--, 3.55 <--, 4.69 <--, 5.15 <--, 4.13 <--, 6.00 <--, 5.26 <--    Phosphorus: 2.2 mg/dL (06-08 @ 00:06)                              8.9    10.22 )-----------( 353      ( 08 Jun 2024 00:06 )             28.2     Urine Studies:  Urinalysis - [06-08-24 @ 00:06]      Color  / Appearance  / SG  / pH       Gluc 134 / Ketone   / Bili  / Urobili        Blood  / Protein  / Leuk Est  / Nitrite       RBC  / WBC  / Hyaline  / Gran  / Sq Epi  / Non Sq Epi  / Bacteria       Iron 16, TIBC 121, %sat 13      [05-17-24 @ 06:00]  Ferritin 720      [05-17-24 @ 06:00]  PTH -- (Ca --)      [05-26-24 @ 01:20]   122  TSH 2.60      [05-17-24 @ 06:00]  Lipid: chol 86, , HDL 27, LDL --      [05-17-24 @ 06:00]    HBsAb <3.0      [06-03-24 @ 16:20]  HBsAg Nonreact      [06-03-24 @ 16:20]  HBcAb Nonreact      [06-03-24 @ 16:20]  HCV 0.09, Nonreact      [06-03-24 @ 16:20]      RADIOLOGY & ADDITIONAL STUDIES:

## 2024-06-08 NOTE — PROGRESS NOTE ADULT - ASSESSMENT
73-year-old male past medical history hypertension, ESRD on dialysis Monday Wednesday Friday, diabetes insulin-dependent presents with hiccups patient endorses persistent hiccups for the last 2 days.   found to have peaked T waves, a new finding. denied dizziness, N/V, denies CP, palps, abd pain  Upon admission seen by CArd, renal and GI  found to have a distended GB prob 2/2 gastroparesis, was started on Reglan  has received 4 doses of baclofen since 4/30 2/2 hiccups, last dose on 5/1 at 5 am  had received Haldol for agitation at 11 pm on 4/30, AMS observed in pm of 5/1  AMS ongoing, RRT x 2 called  now transferred to MICU for encephalopathy requiring  airway protection on 5/2,  intubated for airway protection, was on  propofol and pressors. now off  toxicology consulted upon dx of encephalopathy, not impressed AMS 2/2 Haldol nor baclofen . AMS was 2/2 acute CVA   HD was not done timely until femoral shiley was placed 2/2 clotted RUE AVF. now removed and RIJ shiley placed on 5/3  has been nonverbal since pm 5/1.     MR brain 5/6 c/w L pontine and L cerebellar acute infarcts, no hemorrhage . as per neuro: embolic in nature.   encephalopathy probably 2/2 acute CVA, now follows commands appropriately off sedation.   no SZ focus on EEG,   off CRRT,  HD resumed as per renal.   remains intubated, now trached  off keppra  AC resumed, was initially on  Heparin drip for R common iliac DVT, now held 2/2 GI bleed  completed 5 days of empiric ZOSYN on 5/7, shortly after became septic again after an extubation trial. bacteremia w B. cereus, on Vanc through 5/20 as per ID    s/p trache placement by pulm,  PEG placed by surgery 5/17, tolerating feeds  HD via R IJ.  Tmax overnight( 5/18)  101, cxr c/w RLL PNA, now on Zosyn, blood Cx s sent. remains on Vanco for B. cereus bacteremia   leukocytosis resolved  EKG changes on 5/3 c/w NSTEMI loaded w DAPT , was on Heparin drip x 48 hrs. rpt TTE is unchanged  ID, Neuro , renal, cardiology following.  clotted RUE AVF.  fistulogram was cancelled on 5/25 2/2 fever  HD via Shiley, replaced 6/1  LP done, no sign of meningitis.   NEUROLOGY     - CTH without acute findings   - LP done, no acute findings  - MR brain w acute infarcts: L talya and L cerebellum, nonhemorrhagic  - no Sz focus on EEG    CARDIOVASCULAR   - ptn never had CP. just peaked T waves. was awaiting ischemic study w nucl stress test  - TTE showing EF 72%, rpt unchanged  - EKG changes on 5/3, loaded w DAPT  - BP improved.  on Norvasc, Coreg and hydralazine, max doses. as per renal add Imdur to improve BP    GI  - PEG placed, npo w tube feeds  - Gastroparesis       RENAL   -  HD as per renal  - R IJ shiley removed 6/6, successful HD via AVF on 6/5  - s/p balloon angioplasty of RUE AVF stenosis on 6/4. AVF used for HD on 6/5  - PRBC 6/7      INFECTIOUS DISEASE     completed a course of Zosyn, then became septic, bacteremic a B. cereus, completed 3 days of Meropenem, completed vanc through 5/21  on Zosyn since 5/18 for RLL PNA, afebrile, completed 5/23  recurrent fever 5/25, ZOsyn resumed and stopped on 5/28. afebrile since  cipro started empirically on 6/7 for copious secretions    ENDOCRINE   - DM  - cw ISS    DVT  - RLE, on Heparin drip,     GOC:  Full code  Dispo: acute rehab

## 2024-06-08 NOTE — PROGRESS NOTE ADULT - ASSESSMENT
73-year-old male past medical history hypertension, ESRD on dialysis Monday Wednesday Friday, diabetes insulin-dependent presents with hiccups patient endorses persistent hiccups for the last 2 days. Nephrology consulted for HD needs.    A/P  ESRD:  Center: [place]  Nephrologist: Dr. Hardwick  Access: R AVF nonfunction now s/p thrombectomy 6/4. hd via AVF working well 6/5  hd today  shiley removed 6/6  Consent obtained and placed in ED chart  Renal diet  Monitor BMP  Consider [facility] for rehab where his outpatient Nephrologist, Dr. Hardwick can follow him    Hyperkalemia/Hypokalemia  Improved w/ HD.  C/W HD schedule as above.  Lokelma 10gm PRN for K >5.3 on non-HD days  K stable.  Low K diet.  Monitor closely.    HTN:  BP fluctuating but acceptable   On carvedilol 12.5mg BID, hydralazine 100mg TID.  On norvasc   UF w/ HD as BP permits.  Monitor BP.    Anemia:  Hgb low.  + iron deficiency.  Tsat 13% - on ferrous sulfate 300mg qd via PEG.  Venofer held d/t leukocytosis.  SILVA w/ HD.  Transfuse for Hgb <7.  Monitor Hb.    CKD-MBD   - low for CKD.  PO4 still low today  Supplement PO4 as needed  Sevelamer 1600mg TID d/c'ed 5/21.  Monitor Ca, PO4 daily    Hypocalcemia:  In setting of CKD vs. hypoalbuminemia.  Optimize albumin.  Corrected Ca WNL.  Replete as needed.  Monitor Ca.    hyponatremia  better  monitor 73-year-old male past medical history hypertension, ESRD on dialysis Monday Wednesday Friday, diabetes insulin-dependent presents with hiccups patient endorses persistent hiccups for the last 2 days. Nephrology consulted for HD needs.    A/P  ESRD:  Center: [place]  Nephrologist: Dr. Hardwick  Access: R AVF nonfunction now s/p thrombectomy 6/4. hd via AVF working well 6/5  hd today  shiley removed 6/6  Consent obtained and placed in ED chart  Renal diet  Monitor BMP  Consider [facility] for rehab where his outpatient Nephrologist, Dr. Hardwick can follow him    Hyperkalemia/Hypokalemia  Improved w/ HD.  C/W HD schedule as above.  Lokelma 10gm PRN for K >5.3 on non-HD days  K stable.  Low K diet.  Monitor closely.    HTN:  BP fluctuating but acceptable   On carvedilol 12.5mg BID, hydralazine 100mg TID.  On norvasc   UF w/ HD as BP permits.  Monitor BP.    Anemia:  Hgb low.  + iron deficiency.  Tsat 13% - on ferrous sulfate 300mg qd via PEG.  Venofer held d/t leukocytosis.  SILVA w/ HD.  Transfuse for Hgb <7.  Monitor Hb.    CKD-MBD   - low for CKD.  PO4 still low today  Phos Na-K ordered  Supplement PO4 as needed  Sevelamer 1600mg TID d/c'ed 5/21.  Monitor Ca, PO4 daily    Hypocalcemia:  In setting of CKD vs. hypoalbuminemia.  Optimize albumin.  Corrected Ca WNL.  Replete as needed.  Monitor Ca.    Hyponatremia  better  monitor 73-year-old male past medical history hypertension, ESRD on dialysis Monday Wednesday Friday, diabetes insulin-dependent presents with hiccups patient endorses persistent hiccups for the last 2 days. Nephrology consulted for HD needs.    A/P  ESRD:  Center: [place]  Nephrologist: Dr. Hardwick  Access: R AVF nonfunction now s/p thrombectomy 6/4. hd via AVF working well 6/5  shiley removed 6/6  Continue MWF  Consent obtained and placed in ED chart  Renal diet  Monitor BMP  Consider [facility] for rehab where his outpatient Nephrologist, Dr. Hardwick can follow him    Hyperkalemia/Hypokalemia  Improved w/ HD.  C/W HD schedule as above.  Lokelma 10gm PRN for K >5.3 on non-HD days  K stable.  Low K diet.  Monitor closely.    HTN:  BP fluctuating but acceptable   On carvedilol 12.5mg BID, hydralazine 100mg TID.  On norvasc   UF w/ HD as BP permits.  Monitor BP.    Anemia:  Hgb low.  + iron deficiency.  Tsat 13% - on ferrous sulfate 300mg qd via PEG.  Venofer held d/t leukocytosis.  SILVA w/ HD.  Transfuse for Hgb <7.  Monitor Hb.    CKD-MBD   - low for CKD.  PO4 still low today  Phos Na-K ordered  Supplement PO4 as needed  Sevelamer 1600mg TID d/c'ed 5/21.  Monitor Ca, PO4 daily    Hypocalcemia:  In setting of CKD vs. hypoalbuminemia.  Optimize albumin.  Corrected Ca WNL.  Replete as needed.  Monitor Ca.    Hyponatremia  better  monitor

## 2024-06-09 LAB
GLUCOSE BLDC GLUCOMTR-MCNC: 180 MG/DL — HIGH (ref 70–99)
GLUCOSE BLDC GLUCOMTR-MCNC: 183 MG/DL — HIGH (ref 70–99)
GLUCOSE BLDC GLUCOMTR-MCNC: 199 MG/DL — HIGH (ref 70–99)
GLUCOSE BLDC GLUCOMTR-MCNC: 207 MG/DL — HIGH (ref 70–99)

## 2024-06-09 PROCEDURE — 99233 SBSQ HOSP IP/OBS HIGH 50: CPT

## 2024-06-09 RX ADMIN — SODIUM CHLORIDE 3 MILLILITER(S): 9 INJECTION INTRAMUSCULAR; INTRAVENOUS; SUBCUTANEOUS at 09:51

## 2024-06-09 RX ADMIN — Medication 2: at 11:18

## 2024-06-09 RX ADMIN — Medication 4: at 05:36

## 2024-06-09 RX ADMIN — HUMAN INSULIN 4 UNIT(S): 100 INJECTION, SUSPENSION SUBCUTANEOUS at 05:35

## 2024-06-09 RX ADMIN — ALBUTEROL 2.5 MILLIGRAM(S): 90 AEROSOL, METERED ORAL at 22:15

## 2024-06-09 RX ADMIN — SENNA PLUS 2 TABLET(S): 8.6 TABLET ORAL at 21:40

## 2024-06-09 RX ADMIN — PANTOPRAZOLE SODIUM 40 MILLIGRAM(S): 20 TABLET, DELAYED RELEASE ORAL at 05:04

## 2024-06-09 RX ADMIN — LIDOCAINE 2 PATCH: 4 CREAM TOPICAL at 03:54

## 2024-06-09 RX ADMIN — Medication 300 MILLIGRAM(S): at 11:17

## 2024-06-09 RX ADMIN — CHLORHEXIDINE GLUCONATE 15 MILLILITER(S): 213 SOLUTION TOPICAL at 17:10

## 2024-06-09 RX ADMIN — POLYETHYLENE GLYCOL 3350 17 GRAM(S): 17 POWDER, FOR SOLUTION ORAL at 11:18

## 2024-06-09 RX ADMIN — PANTOPRAZOLE SODIUM 40 MILLIGRAM(S): 20 TABLET, DELAYED RELEASE ORAL at 17:09

## 2024-06-09 RX ADMIN — HUMAN INSULIN 4 UNIT(S): 100 INJECTION, SUSPENSION SUBCUTANEOUS at 17:08

## 2024-06-09 RX ADMIN — ALBUTEROL 2.5 MILLIGRAM(S): 90 AEROSOL, METERED ORAL at 03:40

## 2024-06-09 RX ADMIN — Medication 2: at 00:04

## 2024-06-09 RX ADMIN — ALBUTEROL 2.5 MILLIGRAM(S): 90 AEROSOL, METERED ORAL at 15:51

## 2024-06-09 RX ADMIN — Medication 2: at 17:09

## 2024-06-09 RX ADMIN — CARVEDILOL PHOSPHATE 25 MILLIGRAM(S): 80 CAPSULE, EXTENDED RELEASE ORAL at 17:09

## 2024-06-09 RX ADMIN — HUMAN INSULIN 4 UNIT(S): 100 INJECTION, SUSPENSION SUBCUTANEOUS at 00:05

## 2024-06-09 RX ADMIN — Medication 2: at 23:48

## 2024-06-09 RX ADMIN — HUMAN INSULIN 4 UNIT(S): 100 INJECTION, SUSPENSION SUBCUTANEOUS at 11:18

## 2024-06-09 RX ADMIN — HUMAN INSULIN 4 UNIT(S): 100 INJECTION, SUSPENSION SUBCUTANEOUS at 23:49

## 2024-06-09 RX ADMIN — Medication 200 MILLIGRAM(S): at 11:23

## 2024-06-09 RX ADMIN — Medication 100 MILLIGRAM(S): at 11:17

## 2024-06-09 RX ADMIN — AMLODIPINE BESYLATE 10 MILLIGRAM(S): 2.5 TABLET ORAL at 05:03

## 2024-06-09 RX ADMIN — SODIUM CHLORIDE 3 MILLILITER(S): 9 INJECTION INTRAMUSCULAR; INTRAVENOUS; SUBCUTANEOUS at 03:40

## 2024-06-09 RX ADMIN — CHLORHEXIDINE GLUCONATE 15 MILLILITER(S): 213 SOLUTION TOPICAL at 05:04

## 2024-06-09 RX ADMIN — Medication 100 MILLIGRAM(S): at 05:01

## 2024-06-09 RX ADMIN — ALBUTEROL 2.5 MILLIGRAM(S): 90 AEROSOL, METERED ORAL at 09:50

## 2024-06-09 RX ADMIN — CHLORHEXIDINE GLUCONATE 1 APPLICATION(S): 213 SOLUTION TOPICAL at 05:04

## 2024-06-09 RX ADMIN — APIXABAN 5 MILLIGRAM(S): 2.5 TABLET, FILM COATED ORAL at 17:09

## 2024-06-09 RX ADMIN — CARVEDILOL PHOSPHATE 25 MILLIGRAM(S): 80 CAPSULE, EXTENDED RELEASE ORAL at 05:03

## 2024-06-09 RX ADMIN — SODIUM CHLORIDE 3 MILLILITER(S): 9 INJECTION INTRAMUSCULAR; INTRAVENOUS; SUBCUTANEOUS at 15:51

## 2024-06-09 RX ADMIN — Medication 100 MILLIGRAM(S): at 20:53

## 2024-06-09 RX ADMIN — APIXABAN 5 MILLIGRAM(S): 2.5 TABLET, FILM COATED ORAL at 05:03

## 2024-06-09 RX ADMIN — ATORVASTATIN CALCIUM 20 MILLIGRAM(S): 80 TABLET, FILM COATED ORAL at 21:39

## 2024-06-09 RX ADMIN — Medication 1 PACKET(S): at 05:04

## 2024-06-09 NOTE — PROGRESS NOTE ADULT - SUBJECTIVE AND OBJECTIVE BOX
[facility] [place] [facility]   MD ELIANE BHAGAT MD ANGELA WONG, PA QIAN CHEN, NP    TEL:  OFFICE: [phone]  From 5pm-7am Answering Service [phone]    -- RENAL FOLLOW UP NOTE ---Date of Service 06-09-24 @ 15:57    Patient is a 73y old  Male who presents with a chief complaint of Unstable angina, abn EKG.    Patient seen and examined at bedside. No chest pain/SOB.    VITALS:  T(F): 98.1 (06-09-24 @ 12:00), Max: 98.9 (06-09-24 @ 04:00)  HR: 55 (06-09-24 @ 15:52)  BP: 130/57 (06-09-24 @ 12:00)  RR: 18 (06-09-24 @ 12:00)  SpO2: 98% (06-09-24 @ 12:00)  Wt(kg): --    06-08 @ 07:01  -  06-09 @ 07:00  --------------------------------------------------------  IN: 790 mL / OUT: 0 mL / NET: 790 mL      Weight (kg): 54 (06-09 @ 00:00)    PHYSICAL EXAM:  General: NAD  Neck: No JVD  Respiratory: CTAB, no wheezes, rales or rhonchi  Cardiovascular: S1, S2, RRR  Gastrointestinal: BS+, soft, NT/ND  Extremities: No peripheral edema    Hospital Medications:   MEDICATIONS  (STANDING):  albuterol    0.083% 2.5 milliGRAM(s) Nebulizer every 6 hours  amLODIPine   Tablet 10 milliGRAM(s) Oral daily  apixaban 5 milliGRAM(s) Enteral Tube every 12 hours  atorvastatin 20 milliGRAM(s) Oral at bedtime  carvedilol 25 milliGRAM(s) Oral every 12 hours  chlorhexidine 0.12% Liquid 15 milliLiter(s) Oral Mucosa two times a day  chlorhexidine 2% Cloths 1 Application(s) Topical <User Schedule>  ciprofloxacin   IVPB 400 milliGRAM(s) IV Intermittent every 24 hours  ciprofloxacin   IVPB      dextrose 10% Bolus 125 milliLiter(s) IV Bolus once  dextrose 5%. 1000 milliLiter(s) (100 mL/Hr) IV Continuous <Continuous>  dextrose 5%. 1000 milliLiter(s) (50 mL/Hr) IV Continuous <Continuous>  dextrose 50% Injectable 25 Gram(s) IV Push once  dextrose 50% Injectable 12.5 Gram(s) IV Push once  epoetin reilly (EPOGEN) Injectable 76795 Unit(s) IV Push <User Schedule>  ferrous    sulfate Liquid 300 milliGRAM(s) Enteral Tube daily  glucagon  Injectable 1 milliGRAM(s) IntraMuscular once  hydrALAZINE 100 milliGRAM(s) Oral three times a day  insulin lispro (ADMELOG) corrective regimen sliding scale   SubCutaneous every 6 hours  insulin NPH human recombinant 4 Unit(s) SubCutaneous every 6 hours  lidocaine   4% Patch 2 Patch Transdermal every 24 hours  pantoprazole  Injectable 40 milliGRAM(s) IV Push every 12 hours  polyethylene glycol 3350 17 Gram(s) Oral daily  senna 2 Tablet(s) Oral at bedtime  sodium chloride 0.9% for Nebulization 3 milliLiter(s) Nebulizer every 6 hours      LABS:  06-08    139  |  99  |  21  ----------------------------<  134<H>  4.0   |  27  |  2.77<H>    Ca    8.5      08 Jun 2024 00:06  Phos  2.2     06-08  Mg     2.10     06-08      Creatinine Trend: 2.77 <--, 4.72 <--, 3.55 <--, 4.69 <--, 5.15 <--, 4.13 <--, 6.00 <--               8.9    10.22 )-----------( 353      ( 08 Jun 2024 00:06 )             28.2     Urine Studies:  Urinalysis - [06-08-24 @ 00:06]      Color  / Appearance  / SG  / pH       Gluc 134 / Ketone   / Bili  / Urobili        Blood  / Protein  / Leuk Est  / Nitrite       RBC  / WBC  / Hyaline  / Gran  / Sq Epi  / Non Sq Epi  / Bacteria       Iron 16, TIBC 121, %sat 13      [05-17-24 @ 06:00]  Ferritin 720      [05-17-24 @ 06:00]  PTH -- (Ca --)      [05-26-24 @ 01:20]   122  TSH 2.60      [05-17-24 @ 06:00]  Lipid: chol 86, , HDL 27, LDL --      [05-17-24 @ 06:00]    HBsAb <3.0      [06-03-24 @ 16:20]  HBsAg Nonreact      [06-03-24 @ 16:20]  HBcAb Nonreact      [06-03-24 @ 16:20]  HCV 0.09, Nonreact      [06-03-24 @ 16:20]

## 2024-06-09 NOTE — PROGRESS NOTE ADULT - NS ATTEND AMEND GEN_ALL_CORE FT
73 year old male with ESRD on HD admitted with demand ischemia and hiccups.  Course was complicated by altered mentation and CVA.  He is trached, ventilated and status post PEG.  He also had DVT, bactermeia, and AV graft stenosis, repaired.  Underswent HD through graft last night.  He is weaned off the vent to trach collar.  Has copious secretions and is being treated empirically with cipro for tracheitis.  Eventually will go to rehab.  He is HD stable, afebrile.  Clinically unchanged today.  Agree with plan as outlined above.

## 2024-06-09 NOTE — PROGRESS NOTE ADULT - ASSESSMENT
74 YO M with PMHx of ESRD on HD MWF, HTN, and 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 Baclofen started and course complicated by AMS second to baclofen toxicity requiring intubation and MICU transfer. Course further complicated by LEFT pontine and cerebellar stroke, R AVF stenosis, aspiration and B Cereus bacteremia and RLE DVT. s/p trach and transferred to RCU 5/16 and course complicated by intermittent fever spikes with likely aspiration citrobacter PNA, AOCD, and GIB vs Fe stools. s/p fistulogram 6/4 and able to wean off vent to TC ATC.     NEUROLOGY  # AMS   - MRI HEAD (5/6) with punctate foci in the left talya and left cerebellum with concern for acute infarct.   - MRA HEAD and NECK (5/6) with no large vessel occlusion or stenosis.  - Continue on Lipitor for medical management     PSYCH   # Depression   - Concern for depression with questionable SI   - CO started, however formal discussion and evaluation with no true SI   - Supportive care and encouragement QD    CARDIOVASCULAR  # CP with NSTEMI < demand ischemia with HTN   - Admitted for CP and HTN with peaked T WAVES and ECG with ST deviation on admission   - Troponins mildly elevated on admission with negative CKMB   - TTE (4/30) with EF 72, normal LVRVSF, and no regional wall motion abnormalities   - No need to pursue cath at this time     # Septic vs vasoplegic shock  # Hx of HTN   - Home BP medications held and pressors weaned off   - Continue on coreg 25 (increased 6/1), hydralazine 100 TID and Norvasc 10mg (increased 5/31)   - Monitor blood pressures with Hydralazine 10mg IVP Q8H PRN   - IF remains hypertensive then add Isordil     RESPIRATORY  # Respiratory failure   - AMS noted with baclofen toxicity requiring intubation   - Attempted extubation in MICU however course complicated by aspiration and prolonged course and now s/p tracheostomy with IP on 5/14  - Continued on vent 12/500/5/30 and weaned to TC ATC   - Continue on TC ATC (5/30 - )   - Able to tolerate PMV during the day with good phonation   - Copious secretions this secretions but hold HTS as strong cough and continue on albuterol and NS 0.9%    GI  # Dysphagia   - s/p surgical PEG 5/17   - Failed green dye on 6/1 and continue on tube feeds via PEG   - Continue with PMV during the day this week and reattempt SS sometime next week.     # GIB  - Noted with several episodes of black stool with drop in Hgb (5/26 overnight) requiring PRBC with appropriate response  - Case discussed with GI, questionable anemia from AOCD with ESRD and black stool likely from iron, and no plan for scope at this time  - Continue on PPI and monitor stools    RENAL  # ESRD on HD MWF with R BCF  # Fistula stenosis   - VA AVF (5/2) with Right radiocephalic arteriovenous fistula has no hemodynamically significant stenosis present at the anastomotic site ( cm/sec, EDV 49 cm/sec). The usable segment is partially thrombosed with minimal patency.  - Required R FEMORAL line on medicine, then removed on 5/2, and replaced with R IJ SHILEY on 5/3 for CRRT then converted back to iHD MWF   - R IJ SHILEY removed on 5/10 second to bacteremia and replaced on 5/13  - R IJ SHILEY removed 5/27 given intermittent fevers    - Blood cultures negative and replaced SHILEY on 6/1   - Vein mapping with no adequate veins in UE for conduit (all <2 mm and/or thrombosed) and pending possible revision of RUE AVF.   - s/p RUE Fistuloplasty on 6/4   - s/p Successfully HD via RUE AVF on 6/5  - s/p R IJ Shiley removed 6/6  - Continue on HD MWF per Renal     # Hyperphosphatemia   - Renvela 1600 however phos corrects well with HD   - Monitor off Renvela     INFECTIOUS DISEASE  # Citrobacter aspiration vs trachitis concern   - Recurrent fever spike and CXR with RLL opacity   - BCx (5/25 and 5/30) negative   - SCx (5/25) negative   - SCx (5/30) with citrobacter and completed Zosyn empirically (5/18 - 5/28)  - Thick secretions without fever and RPT SCx negative, however will empirically tx with cipro (6/7 - )     # B Cereus Bacteremia   - Course complicated by fevers and aspiration event   - MRSA (5/1) negative   - BCx (5/2) negative   - SCx (5/4) and BAL (5/12) negative   - BCx (5/10) with bacillus cereus and cleared on (5/12).   - Case discussed with ID and s/p Vancomycin through 5/21 x 10 day course.   - Continue to monitor off antibiotics per ID     HEME  # AOCD with ESRD   - Anemia panel with AOCD and low % sat   - Continue on EPO 10K and Ferrous supplement  - s/p PRBC on 5/16, 5/26 and 6/7 and will monitor HH     VASCULAR   # RLE DVT   - CT AP (5/12) with nonocclusive RIGHT common iliac vein deep venous thrombosis and case discussed with IR with no plans for IVC filter.   - RLE duplex 5/28 with no evidence of DVT  - Initially started on a Heparin GTT with course complicated by questionable GIB given dark tarry stools, however more likely second to iron tabs.   - Discontinued heparin GTT 6/6 and transitioned to Eliquis    ENDOCRINE  # IDDM2 A1C 6.9  - c/w NPH 4U Q6 and ISS  - Monitor and adjust insulin based on FS     SKIN  - R FEMORAL (5/1-5/2)   - R IJ SHILEY (5/3-5/10)   - R IJ SHILEY (5/13 - 5/27)   - R IJ SHILEY (6/1 - 6/6)     ETHICS/ GOC    - FULL CODE     DISPO - PMR recc ACUTE   72 YO M with PMHx of ESRD on HD MWF, HTN, and 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 Baclofen started and course complicated by AMS second to baclofen toxicity requiring intubation and MICU transfer. Course further complicated by LEFT pontine and cerebellar stroke, R AVF stenosis, aspiration and B Cereus bacteremia and RLE DVT. s/p trach and transferred to RCU 5/16 and course complicated by intermittent fever spikes with likely aspiration citrobacter PNA, AOCD, and GIB vs Fe stools. s/p fistulogram 6/4 and able to wean off vent to TC ATC.     NEUROLOGY  # AMS   - MRI HEAD (5/6) with punctate foci in the left talya and left cerebellum with concern for acute infarct.   - MRA HEAD and NECK (5/6) with no large vessel occlusion or stenosis.  - Continue on Lipitor for medical management     PSYCH   # Depression   - Concern for depression with questionable SI   - CO started, however formal discussion and evaluation with no true SI   - Supportive care and encouragement QD    CARDIOVASCULAR  # CP with NSTEMI < demand ischemia with HTN   - Admitted for CP and HTN with peaked T WAVES and ECG with ST deviation on admission   - Troponins mildly elevated on admission with negative CKMB   - TTE (4/30) with EF 72, normal LVRVSF, and no regional wall motion abnormalities   - No need to pursue cath at this time     # Septic vs vasoplegic shock  # Hx of HTN   - Home BP medications held and pressors weaned off   - Continue on coreg 25 (increased 6/1), hydralazine 100 TID and Norvasc 10mg (increased 5/31)   - Monitor blood pressures with Hydralazine 10mg IVP Q8H PRN   - IF remains hypertensive then add Isordil     RESPIRATORY  # Respiratory failure   - AMS noted with baclofen toxicity requiring intubation   - Attempted extubation in MICU however course complicated by aspiration and prolonged course and now s/p tracheostomy with IP on 5/14  - Continued on vent 12/500/5/30 and weaned to TC ATC   - Continue on TC ATC (5/30 - )   - Able to tolerate PMV during the day with good phonation   - Copious secretions this secretions but hold HTS as strong cough and continue on albuterol and NS 0.9%    GI  # Dysphagia   - s/p surgical PEG 5/17   - Failed green dye on 6/1 and continue on tube feeds via PEG   - Continue with PMV during the day this week and reattempt SS sometime this week.     # GIB  - Noted with several episodes of black stool with drop in Hgb (5/26 overnight) requiring PRBC with appropriate response  - Case discussed with GI, questionable anemia from AOCD with ESRD and black stool likely from iron, and no plan for scope at this time  - Continue on PPI and monitor stools    RENAL  # ESRD on HD MWF with R BCF  # Fistula stenosis   - VA AVF (5/2) with Right radiocephalic arteriovenous fistula has no hemodynamically significant stenosis present at the anastomotic site ( cm/sec, EDV 49 cm/sec). The usable segment is partially thrombosed with minimal patency.  - Required R FEMORAL line on medicine, then removed on 5/2, and replaced with R IJ SHILEY on 5/3 for CRRT then converted back to iHD MWF   - R IJ SHILEY removed on 5/10 second to bacteremia and replaced on 5/13  - R IJ SHILEY removed 5/27 given intermittent fevers    - Blood cultures negative and replaced SHILEY on 6/1   - Vein mapping with no adequate veins in UE for conduit (all <2 mm and/or thrombosed) and pending possible revision of RUE AVF.   - s/p RUE Fistuloplasty on 6/4   - s/p Successfully HD via RUE AVF on 6/5  - s/p R IJ Shiley removed 6/6  - Continue on HD MWF per Renal     # Hyperphosphatemia   - Renvela 1600 however phos corrects well with HD   - Monitor off Renvela     INFECTIOUS DISEASE  # Citrobacter aspiration vs trachitis concern   - Recurrent fever spike and CXR with RLL opacity   - BCx (5/25 and 5/30) negative   - SCx (5/25) negative   - SCx (5/30) with citrobacter and completed Zosyn empirically (5/18 - 5/28)  - Thick secretions without fever and RPT SCx negative, however will empirically tx with cipro (6/7 - )     # B Cereus Bacteremia   - Course complicated by fevers and aspiration event   - MRSA (5/1) negative   - BCx (5/2) negative   - SCx (5/4) and BAL (5/12) negative   - BCx (5/10) with bacillus cereus and cleared on (5/12).   - Case discussed with ID and s/p Vancomycin through 5/21 x 10 day course.   - Continue to monitor off antibiotics per ID     HEME  # AOCD with ESRD   - Anemia panel with AOCD and low % sat   - Continue on EPO 10K and Ferrous supplement  - s/p PRBC on 5/16, 5/26 and 6/7 and will monitor HH     VASCULAR   # RLE DVT   - CT AP (5/12) with nonocclusive RIGHT common iliac vein deep venous thrombosis and case discussed with IR with no plans for IVC filter.   - RLE duplex 5/28 with no evidence of DVT  - Initially started on a Heparin GTT with course complicated by questionable GIB given dark tarry stools, however more likely second to iron tabs.   - Discontinued heparin GTT 6/6 and transitioned to Eliquis    ENDOCRINE  # IDDM2 A1C 6.9  - c/w NPH 4U Q6 and ISS  - Monitor and adjust insulin based on FS     SKIN  - R FEMORAL (5/1-5/2)   - R IJ SHILEY (5/3-5/10)   - R IJ SHILEY (5/13 - 5/27)   - R IJ SHILEY (6/1 - 6/6)     ETHICS/ GOC    - FULL CODE     DISPO - PMR recc ACUTE

## 2024-06-09 NOTE — PROVIDER CONTACT NOTE (CRITICAL VALUE NOTIFICATION) - SITUATION
Received a call from laboratory regarding a critical level of Calcium 6.5
sodium 115, k-2.8, glucose 685
Low hemoglobin Hgb 6.2 hct 19.4
Pt hgb 7.0
Aptt 129.2
Patient with critical lab value, hemoglobin 7.0
hbg 6.6 hct 20.1
low hgb 6.3 and hct 19.5

## 2024-06-09 NOTE — PROGRESS NOTE ADULT - SUBJECTIVE AND OBJECTIVE BOX
Cardiovascular Disease Progress Note  Date of service: 06-09-24 @ 11:43    Overnight events: No acute events overnight.  Patient is in no distress. Trach in place.   Otherwise review of systems negative    Objective Findings:  T(C): 37.2 (06-09-24 @ 04:00), Max: 37.2 (06-09-24 @ 04:00)  HR: 56 (06-09-24 @ 09:51) (54 - 72)  BP: 131/51 (06-09-24 @ 04:00) (116/92 - 142/63)  RR: 20 (06-09-24 @ 06:35) (18 - 20)  SpO2: 99% (06-09-24 @ 09:51) (97% - 99%)  Wt(kg): --  Daily     Daily       Physical Exam:  Gen: NAD; Patient resting comfortably  HEENT: EOMI, Normocephalic/ atraumatic  CV: RRR, normal S1 + S2, no m/r/g  Lungs:  Trach in place.   Abd: soft, non-tender; bowel sounds present  Ext: No edema; warm and well perfused    Telemetry: Sinus bradycardia.     Laboratory Data:                        8.9    10.22 )-----------( 353      ( 08 Jun 2024 00:06 )             28.2     06-08    139  |  99  |  21  ----------------------------<  134<H>  4.0   |  27  |  2.77<H>    Ca    8.5      08 Jun 2024 00:06  Phos  2.2     06-08  Mg     2.10     06-08                Inpatient Medications:  MEDICATIONS  (STANDING):  albuterol    0.083% 2.5 milliGRAM(s) Nebulizer every 6 hours  amLODIPine   Tablet 10 milliGRAM(s) Oral daily  apixaban 5 milliGRAM(s) Enteral Tube every 12 hours  atorvastatin 20 milliGRAM(s) Oral at bedtime  carvedilol 25 milliGRAM(s) Oral every 12 hours  chlorhexidine 0.12% Liquid 15 milliLiter(s) Oral Mucosa two times a day  chlorhexidine 2% Cloths 1 Application(s) Topical <User Schedule>  ciprofloxacin   IVPB 400 milliGRAM(s) IV Intermittent every 24 hours  ciprofloxacin   IVPB      dextrose 10% Bolus 125 milliLiter(s) IV Bolus once  dextrose 5%. 1000 milliLiter(s) (100 mL/Hr) IV Continuous <Continuous>  dextrose 5%. 1000 milliLiter(s) (50 mL/Hr) IV Continuous <Continuous>  dextrose 50% Injectable 25 Gram(s) IV Push once  dextrose 50% Injectable 12.5 Gram(s) IV Push once  epoetin reilly (EPOGEN) Injectable 42743 Unit(s) IV Push <User Schedule>  ferrous    sulfate Liquid 300 milliGRAM(s) Enteral Tube daily  glucagon  Injectable 1 milliGRAM(s) IntraMuscular once  hydrALAZINE 100 milliGRAM(s) Oral three times a day  insulin lispro (ADMELOG) corrective regimen sliding scale   SubCutaneous every 6 hours  insulin NPH human recombinant 4 Unit(s) SubCutaneous every 6 hours  lidocaine   4% Patch 2 Patch Transdermal every 24 hours  pantoprazole  Injectable 40 milliGRAM(s) IV Push every 12 hours  polyethylene glycol 3350 17 Gram(s) Oral daily  senna 2 Tablet(s) Oral at bedtime  sodium chloride 0.9% for Nebulization 3 milliLiter(s) Nebulizer every 6 hours      Assessment:  73 year old man with HTN, HLD, T2DM on insulin, and ESRD on HD presents with supply demand ischemia and angina.    Plan of Care:    #Type II myocardial infarction-  Secondary to distributive shock earlier on admission.   The repeat echo shows no new wall motion abnormality.   I would not pursue cath at this time given debility and chronic respiratory failure s/p trach 5/14.       #HTN-  BP acceptable on current regimen.        #ESRD-  HD as per renal     #DVT   - R common Iliac DVT  AC as per RCU.       Over 55 minutes spent on total encounter; more than 50% of the visit was spent counseling and/or coordinating care by the attending physician.      Tico Bravo,  [facility]  Cardiovascular Disease  [phone]

## 2024-06-09 NOTE — PROGRESS NOTE ADULT - SUBJECTIVE AND OBJECTIVE BOX
Patient is a 73y old  Male who presents with a chief complaint of Unstable angina, abn EKG (09 Jun 2024 15:56)      SUBJECTIVE / OVERNIGHT EVENTS:    MEDICATIONS  (STANDING):  albuterol    0.083% 2.5 milliGRAM(s) Nebulizer every 6 hours  amLODIPine   Tablet 10 milliGRAM(s) Oral daily  apixaban 5 milliGRAM(s) Enteral Tube every 12 hours  atorvastatin 20 milliGRAM(s) Oral at bedtime  carvedilol 25 milliGRAM(s) Oral every 12 hours  chlorhexidine 0.12% Liquid 15 milliLiter(s) Oral Mucosa two times a day  chlorhexidine 2% Cloths 1 Application(s) Topical <User Schedule>  ciprofloxacin   IVPB 400 milliGRAM(s) IV Intermittent every 24 hours  ciprofloxacin   IVPB      dextrose 10% Bolus 125 milliLiter(s) IV Bolus once  dextrose 5%. 1000 milliLiter(s) (100 mL/Hr) IV Continuous <Continuous>  dextrose 5%. 1000 milliLiter(s) (50 mL/Hr) IV Continuous <Continuous>  dextrose 50% Injectable 25 Gram(s) IV Push once  dextrose 50% Injectable 12.5 Gram(s) IV Push once  epoetin reilly (EPOGEN) Injectable 62600 Unit(s) IV Push <User Schedule>  ferrous    sulfate Liquid 300 milliGRAM(s) Enteral Tube daily  glucagon  Injectable 1 milliGRAM(s) IntraMuscular once  hydrALAZINE 100 milliGRAM(s) Oral three times a day  insulin lispro (ADMELOG) corrective regimen sliding scale   SubCutaneous every 6 hours  insulin NPH human recombinant 4 Unit(s) SubCutaneous every 6 hours  lidocaine   4% Patch 2 Patch Transdermal every 24 hours  pantoprazole  Injectable 40 milliGRAM(s) IV Push every 12 hours  polyethylene glycol 3350 17 Gram(s) Oral daily  senna 2 Tablet(s) Oral at bedtime  sodium chloride 0.9% for Nebulization 3 milliLiter(s) Nebulizer every 6 hours    MEDICATIONS  (PRN):  acetaminophen   Oral Liquid .. 650 milliGRAM(s) Oral every 6 hours PRN Temp greater or equal to 38C (100.4F), Moderate Pain (4 - 6)  dextrose Oral Gel 15 Gram(s) Oral once PRN Blood Glucose LESS THAN 70 milliGRAM(s)/deciliter  hydrALAZINE Injectable 10 milliGRAM(s) IV Push every 8 hours PRN SBP > 180  sodium chloride 0.9% lock flush 10 milliLiter(s) IV Push every 1 hour PRN Pre/post blood products, medications, blood draw, and to maintain line patency      Vital Signs Last 24 Hrs  T(F): 97.9 (06-09-24 @ 16:00), Max: 98.9 (06-09-24 @ 04:00)  HR: 57 (06-09-24 @ 16:00) (54 - 561)  BP: 136/74 (06-09-24 @ 16:00) (116/92 - 136/74)  RR: 18 (06-09-24 @ 16:00) (18 - 20)  SpO2: 98% (06-09-24 @ 16:00) (97% - 99%)  Telemetry:   CAPILLARY BLOOD GLUCOSE      POCT Blood Glucose.: 199 mg/dL (09 Jun 2024 23:25)  POCT Blood Glucose.: 180 mg/dL (09 Jun 2024 17:00)  POCT Blood Glucose.: 183 mg/dL (09 Jun 2024 11:10)  POCT Blood Glucose.: 207 mg/dL (09 Jun 2024 05:18)  POCT Blood Glucose.: 160 mg/dL (08 Jun 2024 23:39)    I&O's Summary    08 Jun 2024 07:01  -  09 Jun 2024 07:00  --------------------------------------------------------  IN: 790 mL / OUT: 0 mL / NET: 790 mL    09 Jun 2024 07:01  -  09 Jun 2024 23:38  --------------------------------------------------------  IN: 640 mL / OUT: 0 mL / NET: 640 mL        PHYSICAL EXAM:  GENERAL: NAD, well-developed  HEAD:  Atraumatic, Normocephalic  EYES: EOMI, PERRLA, conjunctiva and sclera clear  NECK: Supple, No JVD  CHEST/LUNG: Clear to auscultation bilaterally; No wheeze  HEART: Regular rate and rhythm; No murmurs, rubs, or gallops  ABDOMEN: Soft, Nontender, Nondistended; Bowel sounds present  EXTREMITIES:  2+ Peripheral Pulses, No clubbing, cyanosis, or edema  PSYCH: AAOx3  NEUROLOGY: non-focal  SKIN: No rashes or lesions    LABS:                        8.9    10.22 )-----------( 353      ( 08 Jun 2024 00:06 )             28.2     06-08    139  |  99  |  21  ----------------------------<  134<H>  4.0   |  27  |  2.77<H>    Ca    8.5      08 Jun 2024 00:06  Phos  2.2     06-08  Mg     2.10     06-08            Urinalysis Basic - ( 08 Jun 2024 00:06 )    Color: x / Appearance: x / SG: x / pH: x  Gluc: 134 mg/dL / Ketone: x  / Bili: x / Urobili: x   Blood: x / Protein: x / Nitrite: x   Leuk Esterase: x / RBC: x / WBC x   Sq Epi: x / Non Sq Epi: x / Bacteria: x        RADIOLOGY & ADDITIONAL TESTS:    Imaging Personally Reviewed:    Consultant(s) Notes Reviewed:      Care Discussed with Consultants/Other Providers:   Patient is a 73y old  Male who presents with a chief complaint of Unstable angina, abn EKG (09 Jun 2024 15:56)      SUBJECTIVE / OVERNIGHT EVENTS: ptn is stble    MEDICATIONS  (STANDING):  albuterol    0.083% 2.5 milliGRAM(s) Nebulizer every 6 hours  amLODIPine   Tablet 10 milliGRAM(s) Oral daily  apixaban 5 milliGRAM(s) Enteral Tube every 12 hours  atorvastatin 20 milliGRAM(s) Oral at bedtime  carvedilol 25 milliGRAM(s) Oral every 12 hours  chlorhexidine 0.12% Liquid 15 milliLiter(s) Oral Mucosa two times a day  chlorhexidine 2% Cloths 1 Application(s) Topical <User Schedule>  ciprofloxacin   IVPB 400 milliGRAM(s) IV Intermittent every 24 hours  ciprofloxacin   IVPB      dextrose 10% Bolus 125 milliLiter(s) IV Bolus once  dextrose 5%. 1000 milliLiter(s) (100 mL/Hr) IV Continuous <Continuous>  dextrose 5%. 1000 milliLiter(s) (50 mL/Hr) IV Continuous <Continuous>  dextrose 50% Injectable 25 Gram(s) IV Push once  dextrose 50% Injectable 12.5 Gram(s) IV Push once  epoetin reilly (EPOGEN) Injectable 94748 Unit(s) IV Push <User Schedule>  ferrous    sulfate Liquid 300 milliGRAM(s) Enteral Tube daily  glucagon  Injectable 1 milliGRAM(s) IntraMuscular once  hydrALAZINE 100 milliGRAM(s) Oral three times a day  insulin lispro (ADMELOG) corrective regimen sliding scale   SubCutaneous every 6 hours  insulin NPH human recombinant 4 Unit(s) SubCutaneous every 6 hours  lidocaine   4% Patch 2 Patch Transdermal every 24 hours  pantoprazole  Injectable 40 milliGRAM(s) IV Push every 12 hours  polyethylene glycol 3350 17 Gram(s) Oral daily  senna 2 Tablet(s) Oral at bedtime  sodium chloride 0.9% for Nebulization 3 milliLiter(s) Nebulizer every 6 hours    MEDICATIONS  (PRN):  acetaminophen   Oral Liquid .. 650 milliGRAM(s) Oral every 6 hours PRN Temp greater or equal to 38C (100.4F), Moderate Pain (4 - 6)  dextrose Oral Gel 15 Gram(s) Oral once PRN Blood Glucose LESS THAN 70 milliGRAM(s)/deciliter  hydrALAZINE Injectable 10 milliGRAM(s) IV Push every 8 hours PRN SBP > 180  sodium chloride 0.9% lock flush 10 milliLiter(s) IV Push every 1 hour PRN Pre/post blood products, medications, blood draw, and to maintain line patency      Vital Signs Last 24 Hrs  T(F): 97.9 (06-09-24 @ 16:00), Max: 98.9 (06-09-24 @ 04:00)  HR: 57 (06-09-24 @ 16:00) (54 - 561)  BP: 136/74 (06-09-24 @ 16:00) (116/92 - 136/74)  RR: 18 (06-09-24 @ 16:00) (18 - 20)  SpO2: 98% (06-09-24 @ 16:00) (97% - 99%)  Telemetry:   CAPILLARY BLOOD GLUCOSE      POCT Blood Glucose.: 199 mg/dL (09 Jun 2024 23:25)  POCT Blood Glucose.: 180 mg/dL (09 Jun 2024 17:00)  POCT Blood Glucose.: 183 mg/dL (09 Jun 2024 11:10)  POCT Blood Glucose.: 207 mg/dL (09 Jun 2024 05:18)  POCT Blood Glucose.: 160 mg/dL (08 Jun 2024 23:39)    I&O's Summary    08 Jun 2024 07:01  -  09 Jun 2024 07:00  --------------------------------------------------------  IN: 790 mL / OUT: 0 mL / NET: 790 mL    09 Jun 2024 07:01  -  09 Jun 2024 23:38  --------------------------------------------------------  IN: 640 mL / OUT: 0 mL / NET: 640 mL        PHYSICAL EXAM:  GENERAL: NAD, well-developed  HEAD:  Atraumatic, Normocephalic  EYES: EOMI, PERRLA, conjunctiva and sclera clear  NECK: Supple, No JVD  CHEST/LUNG: Clear to auscultation bilaterally; No wheeze  HEART: Regular rate and rhythm; No murmurs, rubs, or gallops  ABDOMEN: Soft, Nontender, Nondistended; Bowel sounds present  EXTREMITIES:  2+ Peripheral Pulses, No clubbing, cyanosis, or edema  PSYCH: AAOx3  NEUROLOGY: non-focal  SKIN: No rashes or lesions    LABS:                        8.9    10.22 )-----------( 353      ( 08 Jun 2024 00:06 )             28.2     06-08    139  |  99  |  21  ----------------------------<  134<H>  4.0   |  27  |  2.77<H>    Ca    8.5      08 Jun 2024 00:06  Phos  2.2     06-08  Mg     2.10     06-08            Urinalysis Basic - ( 08 Jun 2024 00:06 )    Color: x / Appearance: x / SG: x / pH: x  Gluc: 134 mg/dL / Ketone: x  / Bili: x / Urobili: x   Blood: x / Protein: x / Nitrite: x   Leuk Esterase: x / RBC: x / WBC x   Sq Epi: x / Non Sq Epi: x / Bacteria: x        RADIOLOGY & ADDITIONAL TESTS:    Imaging Personally Reviewed:    Consultant(s) Notes Reviewed:      Care Discussed with Consultants/Other Providers:

## 2024-06-09 NOTE — PROGRESS NOTE ADULT - ASSESSMENT
73-year-old male past medical history hypertension, ESRD on dialysis Monday Wednesday Friday, diabetes insulin-dependent presents with hiccups patient endorses persistent hiccups for the last 2 days. Nephrology consulted for HD needs.    A/P  ESRD:  Center: [facility]  Nephrologist: Dr. Hardwick  Access: R AVF nonfunction now s/p thrombectomy 6/4.  HD via AVF working well 6/5;  shiley removed 6/6  Continue MWF  Consent obtained and placed in ED chart  Renal diet  Monitor BMP - no labs today.  Consider [facility] for rehab where his outpatient Nephrologist, Dr. Hardwick can follow him.    Hyperkalemia/Hypokalemia  Improved w/ HD.  C/W HD schedule as above.  Lokelma 10gm PRN for K >5.3 on non-HD days  K stable.  Low K diet.  Monitor closely - no labs today.    HTN:  BP fluctuating; controlled.  On carvedilol 12.5mg BID, hydralazine 100mg TID.  On norvasc -- > down titrate if BP remains marginal / controlled.  UF w/ HD as BP permits.  Monitor BP.    Anemia:  Hgb low.  + iron deficiency.  Tsat 13% - on ferrous sulfate 300mg qd via PEG.  Venofer held d/t leukocytosis.  SILVA w/ HD.  Transfuse for Hgb <7.  Monitor Hb.    CKD-MBD   - low for CKD.  PO4 low; repleted w/ PhosNaK on 6/8.  Supplement PO4 as needed.  Sevelamer 1600mg TID d/c'ed 5/21.  Monitor Ca, PO4 daily    Hypocalcemia:  In setting of CKD vs. hypoalbuminemia.  Optimize albumin.  Corrected Ca WNL.  Replete as needed.  Monitor Ca.    Hyponatremia  better  UF w/ HD.  Free fluid restriction to 1L/day.  monitor

## 2024-06-09 NOTE — PROVIDER CONTACT NOTE (CRITICAL VALUE NOTIFICATION) - TEST AND RESULT REPORTED:
hgb 6.3 hct 19.5
Aptt 129.2
Hgb 6.2 hct 19.4
Calcium 6.5
Hemoglobin 7.0
hbg 6.6 hct 20.1
sodium 115, k-2.8, glucose 685
HGB 7.0

## 2024-06-09 NOTE — PROGRESS NOTE ADULT - NS ATTEST RISK PROBLEM GEN_ALL_CORE FT
multiple medical issues which are life threatening.
multiple medical issues which are life threatening.

## 2024-06-09 NOTE — PROVIDER CONTACT NOTE (CRITICAL VALUE NOTIFICATION) - ACTION/TREATMENT ORDERED:
FOBT and Protonix IV push.
No new orders at this time. Pt stable
Provider made aware. No new orders for now. Continue to monitor.
repeat sugar, false results due to blood drawn from arm getting zosyn with dextrose
Follow Nomogram protocol
repeat labs
For Blood transfusion, 1 unit PRBC
Provider to order laboratory add on for albumin

## 2024-06-09 NOTE — PROGRESS NOTE ADULT - SUBJECTIVE AND OBJECTIVE BOX
Patient is a 73y Male     Patient is a 73y old  Male who presents with a chief complaint of Unstable angina, abn EKG (08 Jun 2024 23:18)      HPI:  73-year-old male past medical history hypertension, ESRD on dialysis Monday Wednesday Friday, diabetes insulin-dependent presents with hiccups patient endorses persistent hiccups for the last 2 days. found to have peaked T waves, a new finding. denies dizziness, N/V, denies CP, palps, abd pain (29 Apr 2024 21:15)      PAST MEDICAL & SURGICAL HISTORY:  Diabetes      Benign essential HTN      HLD (hyperlipidemia)      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      ESRD on hemodialysis      Arteriovenous fistula          MEDICATIONS  (STANDING):  albuterol    0.083% 2.5 milliGRAM(s) Nebulizer every 6 hours  amLODIPine   Tablet 10 milliGRAM(s) Oral daily  apixaban 5 milliGRAM(s) Enteral Tube every 12 hours  atorvastatin 20 milliGRAM(s) Oral at bedtime  carvedilol 25 milliGRAM(s) Oral every 12 hours  chlorhexidine 0.12% Liquid 15 milliLiter(s) Oral Mucosa two times a day  chlorhexidine 2% Cloths 1 Application(s) Topical <User Schedule>  ciprofloxacin   IVPB 400 milliGRAM(s) IV Intermittent every 24 hours  ciprofloxacin   IVPB      dextrose 10% Bolus 125 milliLiter(s) IV Bolus once  dextrose 5%. 1000 milliLiter(s) (100 mL/Hr) IV Continuous <Continuous>  dextrose 5%. 1000 milliLiter(s) (50 mL/Hr) IV Continuous <Continuous>  dextrose 50% Injectable 25 Gram(s) IV Push once  dextrose 50% Injectable 12.5 Gram(s) IV Push once  epoetin reilly (EPOGEN) Injectable 27319 Unit(s) IV Push <User Schedule>  ferrous    sulfate Liquid 300 milliGRAM(s) Enteral Tube daily  glucagon  Injectable 1 milliGRAM(s) IntraMuscular once  hydrALAZINE 100 milliGRAM(s) Oral three times a day  insulin lispro (ADMELOG) corrective regimen sliding scale   SubCutaneous every 6 hours  insulin NPH human recombinant 4 Unit(s) SubCutaneous every 6 hours  lidocaine   4% Patch 2 Patch Transdermal every 24 hours  pantoprazole  Injectable 40 milliGRAM(s) IV Push every 12 hours  polyethylene glycol 3350 17 Gram(s) Oral daily  senna 2 Tablet(s) Oral at bedtime  sodium chloride 0.9% for Nebulization 3 milliLiter(s) Nebulizer every 6 hours      Allergies    No Known Allergies    Intolerances        SOCIAL HISTORY:  Denies ETOh,Smoking,     FAMILY HISTORY:  FHx: diabetes mellitus (Father, Aunt)        REVIEW OF SYSTEMS:    CONSTITUTIONAL: No weakness, fevers or chills  EYES/ENT: No visual changes;  No vertigo or throat pain   NECK: No pain or stiffness  RESPIRATORY: No cough, wheezing, hemoptysis; No shortness of breath  CARDIOVASCULAR: No chest pain or palpitations  GASTROINTESTINAL: No abdominal or epigastric pain. No nausea, vomiting, or hematemesis; No diarrhea or constipation. No melena or hematochezia.  GENITOURINARY: No dysuria, frequency or hematuria  NEUROLOGICAL: No numbness or weakness  SKIN: No itching, burning, rashes, or lesions   All other review of systems is negative unless indicated above.    VITAL:  T(C): , Max: 37.2 (06-08-24 @ 11:16)  T(F): , Max: 98.9 (06-08-24 @ 11:16)  HR: 58 (06-09-24 @ 04:00)  BP: 131/51 (06-09-24 @ 04:00)  BP(mean): --  RR: 20 (06-09-24 @ 04:00)  SpO2: 98% (06-09-24 @ 04:00)  Wt(kg): --    I and O's:    06-06 @ 07:01  -  06-07 @ 07:00  --------------------------------------------------------  IN: 1280 mL / OUT: 0 mL / NET: 1280 mL    06-07 @ 07:01  -  06-08 @ 07:00  --------------------------------------------------------  IN: 2080 mL / OUT: 2700 mL / NET: -620 mL    06-08 @ 07:01  -  06-09 @ 06:32  --------------------------------------------------------  IN: 790 mL / OUT: 0 mL / NET: 790 mL        Weight (kg): 54 (06-09 @ 00:00)    PHYSICAL EXAM:    Constitutional: NAD  HEENT: PERRLA,   Neck: No JVD  Respiratory: CTA B/L  Cardiovascular: S1 and S2  Gastrointestinal: BS+, soft, NT/ND  Extremities: No peripheral edema  Neurological: A/O x 3, no focal deficits  Psychiatric: Normal mood, normal affect  : No Abbasi  Skin: No rashes  Access: Not applicable  Back: No CVA tenderness    LABS:                        8.9    10.22 )-----------( 353      ( 08 Jun 2024 00:06 )             28.2     06-08    139  |  99  |  21  ----------------------------<  134<H>  4.0   |  27  |  2.77<H>    Ca    8.5      08 Jun 2024 00:06  Phos  2.2     06-08  Mg     2.10     06-08            RADIOLOGY & ADDITIONAL STUDIES:

## 2024-06-09 NOTE — PROGRESS NOTE ADULT - SUBJECTIVE AND OBJECTIVE BOX
CHIEF COMPLAINT: Patient is a 73y old  Male who presents with a chief complaint of Unstable angina, abn EKG (09 Jun 2024 06:31)      INTERVAL EVENTS:    REVIEW OF SYSTEMS: Seen by bedside during AM rounds and           OBJECTIVE:  ICU Vital Signs Last 24 Hrs  T(C): 37.2 (09 Jun 2024 04:00), Max: 37.2 (08 Jun 2024 11:16)  T(F): 98.9 (09 Jun 2024 04:00), Max: 98.9 (08 Jun 2024 11:16)  HR: 54 (09 Jun 2024 06:35) (54 - 72)  BP: 131/51 (09 Jun 2024 04:00) (116/92 - 142/63)  BP(mean): --  ABP: --  ABP(mean): --  RR: 20 (09 Jun 2024 06:35) (18 - 20)  SpO2: 97% (09 Jun 2024 06:35) (97% - 100%)    O2 Parameters below as of 09 Jun 2024 06:35  Patient On (Oxygen Delivery Method): tracheostomy collar  O2 Flow (L/min): 6  O2 Concentration (%): 28          06-08 @ 07:01  -  06-09 @ 07:00  --------------------------------------------------------  IN: 790 mL / OUT: 0 mL / NET: 790 mL      CAPILLARY BLOOD GLUCOSE      POCT Blood Glucose.: 207 mg/dL (09 Jun 2024 05:18)      HOSPITAL MEDICATIONS:  MEDICATIONS  (STANDING):  albuterol    0.083% 2.5 milliGRAM(s) Nebulizer every 6 hours  amLODIPine   Tablet 10 milliGRAM(s) Oral daily  apixaban 5 milliGRAM(s) Enteral Tube every 12 hours  atorvastatin 20 milliGRAM(s) Oral at bedtime  carvedilol 25 milliGRAM(s) Oral every 12 hours  chlorhexidine 0.12% Liquid 15 milliLiter(s) Oral Mucosa two times a day  chlorhexidine 2% Cloths 1 Application(s) Topical <User Schedule>  ciprofloxacin   IVPB 400 milliGRAM(s) IV Intermittent every 24 hours  ciprofloxacin   IVPB      dextrose 10% Bolus 125 milliLiter(s) IV Bolus once  dextrose 5%. 1000 milliLiter(s) (100 mL/Hr) IV Continuous <Continuous>  dextrose 5%. 1000 milliLiter(s) (50 mL/Hr) IV Continuous <Continuous>  dextrose 50% Injectable 25 Gram(s) IV Push once  dextrose 50% Injectable 12.5 Gram(s) IV Push once  epoetin reilly (EPOGEN) Injectable 80375 Unit(s) IV Push <User Schedule>  ferrous    sulfate Liquid 300 milliGRAM(s) Enteral Tube daily  glucagon  Injectable 1 milliGRAM(s) IntraMuscular once  hydrALAZINE 100 milliGRAM(s) Oral three times a day  insulin lispro (ADMELOG) corrective regimen sliding scale   SubCutaneous every 6 hours  insulin NPH human recombinant 4 Unit(s) SubCutaneous every 6 hours  lidocaine   4% Patch 2 Patch Transdermal every 24 hours  pantoprazole  Injectable 40 milliGRAM(s) IV Push every 12 hours  polyethylene glycol 3350 17 Gram(s) Oral daily  senna 2 Tablet(s) Oral at bedtime  sodium chloride 0.9% for Nebulization 3 milliLiter(s) Nebulizer every 6 hours    MEDICATIONS  (PRN):  acetaminophen   Oral Liquid .. 650 milliGRAM(s) Oral every 6 hours PRN Temp greater or equal to 38C (100.4F), Moderate Pain (4 - 6)  dextrose Oral Gel 15 Gram(s) Oral once PRN Blood Glucose LESS THAN 70 milliGRAM(s)/deciliter  hydrALAZINE Injectable 10 milliGRAM(s) IV Push every 8 hours PRN SBP > 180  sodium chloride 0.9% lock flush 10 milliLiter(s) IV Push every 1 hour PRN Pre/post blood products, medications, blood draw, and to maintain line patency      PHYSICAL EXAMINATION    LABS:                        8.9    10.22 )-----------( 353      ( 08 Jun 2024 00:06 )             28.2     06-08    139  |  99  |  21  ----------------------------<  134<H>  4.0   |  27  |  2.77<H>    Ca    8.5      08 Jun 2024 00:06  Phos  2.2     06-08  Mg     2.10     06-08        Urinalysis Basic - ( 08 Jun 2024 00:06 )    Color: x / Appearance: x / SG: x / pH: x  Gluc: 134 mg/dL / Ketone: x  / Bili: x / Urobili: x   Blood: x / Protein: x / Nitrite: x   Leuk Esterase: x / RBC: x / WBC x   Sq Epi: x / Non Sq Epi: x / Bacteria: x            PAST MEDICAL & SURGICAL HISTORY:  Diabetes      Benign essential HTN      HLD (hyperlipidemia)      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 CHIEF COMPLAINT: Patient is a 73y old  Male who presents with a chief complaint of Unstable angina, abn EKG (09 Jun 2024 06:31)      INTERVAL EVENTS:  - No interval events overnight     REVIEW OF SYSTEMS: Seen by bedside during AM rounds and denies SOB          OBJECTIVE:  ICU Vital Signs Last 24 Hrs  T(C): 37.2 (09 Jun 2024 04:00), Max: 37.2 (08 Jun 2024 11:16)  T(F): 98.9 (09 Jun 2024 04:00), Max: 98.9 (08 Jun 2024 11:16)  HR: 54 (09 Jun 2024 06:35) (54 - 72)  BP: 131/51 (09 Jun 2024 04:00) (116/92 - 142/63)  BP(mean): --  ABP: --  ABP(mean): --  RR: 20 (09 Jun 2024 06:35) (18 - 20)  SpO2: 97% (09 Jun 2024 06:35) (97% - 100%)    O2 Parameters below as of 09 Jun 2024 06:35  Patient On (Oxygen Delivery Method): tracheostomy collar  O2 Flow (L/min): 6  O2 Concentration (%): 28          06-08 @ 07:01  -  06-09 @ 07:00  --------------------------------------------------------  IN: 790 mL / OUT: 0 mL / NET: 790 mL      CAPILLARY BLOOD GLUCOSE  POCT Blood Glucose.: 207 mg/dL (09 Jun 2024 05:18)      HOSPITAL MEDICATIONS:  MEDICATIONS  (STANDING):  albuterol    0.083% 2.5 milliGRAM(s) Nebulizer every 6 hours  amLODIPine   Tablet 10 milliGRAM(s) Oral daily  apixaban 5 milliGRAM(s) Enteral Tube every 12 hours  atorvastatin 20 milliGRAM(s) Oral at bedtime  carvedilol 25 milliGRAM(s) Oral every 12 hours  chlorhexidine 0.12% Liquid 15 milliLiter(s) Oral Mucosa two times a day  chlorhexidine 2% Cloths 1 Application(s) Topical <User Schedule>  ciprofloxacin   IVPB 400 milliGRAM(s) IV Intermittent every 24 hours  ciprofloxacin   IVPB      dextrose 10% Bolus 125 milliLiter(s) IV Bolus once  dextrose 5%. 1000 milliLiter(s) (100 mL/Hr) IV Continuous <Continuous>  dextrose 5%. 1000 milliLiter(s) (50 mL/Hr) IV Continuous <Continuous>  dextrose 50% Injectable 25 Gram(s) IV Push once  dextrose 50% Injectable 12.5 Gram(s) IV Push once  epoetin reilly (EPOGEN) Injectable 99144 Unit(s) IV Push <User Schedule>  ferrous    sulfate Liquid 300 milliGRAM(s) Enteral Tube daily  glucagon  Injectable 1 milliGRAM(s) IntraMuscular once  hydrALAZINE 100 milliGRAM(s) Oral three times a day  insulin lispro (ADMELOG) corrective regimen sliding scale   SubCutaneous every 6 hours  insulin NPH human recombinant 4 Unit(s) SubCutaneous every 6 hours  lidocaine   4% Patch 2 Patch Transdermal every 24 hours  pantoprazole  Injectable 40 milliGRAM(s) IV Push every 12 hours  polyethylene glycol 3350 17 Gram(s) Oral daily  senna 2 Tablet(s) Oral at bedtime  sodium chloride 0.9% for Nebulization 3 milliLiter(s) Nebulizer every 6 hours    MEDICATIONS  (PRN):  acetaminophen   Oral Liquid .. 650 milliGRAM(s) Oral every 6 hours PRN Temp greater or equal to 38C (100.4F), Moderate Pain (4 - 6)  dextrose Oral Gel 15 Gram(s) Oral once PRN Blood Glucose LESS THAN 70 milliGRAM(s)/deciliter  hydrALAZINE Injectable 10 milliGRAM(s) IV Push every 8 hours PRN SBP > 180  sodium chloride 0.9% lock flush 10 milliLiter(s) IV Push every 1 hour PRN Pre/post blood products, medications, blood draw, and to maintain line patency      PHYSICAL EXAMINATION  General: NAD   HEENT: Trach with PMV good phonation however copious secretions   Cards: S1/S2, no murmurs   Pulm: CTA bilaterally. No wheezes.   Abdomen: Soft, nondistended and nontender. BS (+) PEG present.   Extremities: No pedal edema. THAI of BL upper and lower extremities with severe weakness   Neurology: AOx3 with no acute focal neurological deficits    LABS:                        8.9    10.22 )-----------( 353      ( 08 Jun 2024 00:06 )             28.2     06-08    139  |  99  |  21  ----------------------------<  134<H>  4.0   |  27  |  2.77<H>    Ca    8.5      08 Jun 2024 00:06  Phos  2.2     06-08  Mg     2.10     06-08        Urinalysis Basic - ( 08 Jun 2024 00:06 )    Color: x / Appearance: x / SG: x / pH: x  Gluc: 134 mg/dL / Ketone: x  / Bili: x / Urobili: x   Blood: x / Protein: x / Nitrite: x   Leuk Esterase: x / RBC: x / WBC x   Sq Epi: x / Non Sq Epi: x / Bacteria: x            PAST MEDICAL & SURGICAL HISTORY:  Diabetes      Benign essential HTN      HLD (hyperlipidemia)      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

## 2024-06-09 NOTE — PROVIDER CONTACT NOTE (CRITICAL VALUE NOTIFICATION) - RECOMMENDATIONS
Provider aware
Provider made aware. No new orders for now. Continue to monitor.
MD made aware
Provider notified
Called provider
contact provider
Follow Nomogram protocol
contact provider

## 2024-06-09 NOTE — PROVIDER CONTACT NOTE (CRITICAL VALUE NOTIFICATION) - NAME OF MD/NP/PA/DO NOTIFIED:
ROBBIN Mendez
April Leblanc
Roe Rhodes
Andrea Kendall
April Leblanc
Andrea Kendall
[facility] [id number]
[place] SIEGEL

## 2024-06-09 NOTE — PROVIDER CONTACT NOTE (CRITICAL VALUE NOTIFICATION) - ASSESSMENT
sodium 115, k-2.8, glucose 685
/42  P 63  Spo2 100%  T 99.1  R 12
No s/s of bleeding noted
Patient currently receiving HD at bedside. Bradycardic on telemetry monitoring.
hbg 6.6 hct 20.1
/42  P 63  Spo2 100%  RR 12  T 99.1
Pt stable. bp 139/49, hr 66, SPO2 100.
No S/S of distress.

## 2024-06-09 NOTE — PROVIDER CONTACT NOTE (CRITICAL VALUE NOTIFICATION) - BACKGROUND
Admitted for disorders of electrolyte and fluid balance
fistulagram, dm2,
fistulagram, dm2,
Electrolyte and fluid balance  ESRD on HD
Patient is on heparin drip at 13ml/hr
Admitted for other disorders of electrolyte and fluid imbalance
Fluid and Electrolytes Imbalance  ESRD on HD
acute respiratory failure, htn, hld

## 2024-06-10 LAB
ANION GAP SERPL CALC-SCNC: 18 MMOL/L — HIGH (ref 7–14)
BUN SERPL-MCNC: 71 MG/DL — HIGH (ref 7–23)
CALCIUM SERPL-MCNC: 8.6 MG/DL — SIGNIFICANT CHANGE UP (ref 8.4–10.5)
CHLORIDE SERPL-SCNC: 92 MMOL/L — LOW (ref 98–107)
CO2 SERPL-SCNC: 21 MMOL/L — LOW (ref 22–31)
CREAT SERPL-MCNC: 5.16 MG/DL — HIGH (ref 0.5–1.3)
EGFR: 11 ML/MIN/1.73M2 — LOW
GLUCOSE BLDC GLUCOMTR-MCNC: 158 MG/DL — HIGH (ref 70–99)
GLUCOSE BLDC GLUCOMTR-MCNC: 167 MG/DL — HIGH (ref 70–99)
GLUCOSE BLDC GLUCOMTR-MCNC: 173 MG/DL — HIGH (ref 70–99)
GLUCOSE BLDC GLUCOMTR-MCNC: 197 MG/DL — HIGH (ref 70–99)
GLUCOSE SERPL-MCNC: 174 MG/DL — HIGH (ref 70–99)
HCT VFR BLD CALC: 31.4 % — LOW (ref 39–50)
HGB BLD-MCNC: 9.7 G/DL — LOW (ref 13–17)
MAGNESIUM SERPL-MCNC: 2.5 MG/DL — SIGNIFICANT CHANGE UP (ref 1.6–2.6)
MCHC RBC-ENTMCNC: 22.7 PG — LOW (ref 27–34)
MCHC RBC-ENTMCNC: 30.9 GM/DL — LOW (ref 32–36)
MCV RBC AUTO: 73.5 FL — LOW (ref 80–100)
NRBC # BLD: 0 /100 WBCS — SIGNIFICANT CHANGE UP (ref 0–0)
NRBC # FLD: 0.02 K/UL — HIGH (ref 0–0)
PHOSPHATE SERPL-MCNC: 2.7 MG/DL — SIGNIFICANT CHANGE UP (ref 2.5–4.5)
PLATELET # BLD AUTO: 334 K/UL — SIGNIFICANT CHANGE UP (ref 150–400)
POTASSIUM SERPL-MCNC: 5.5 MMOL/L — HIGH (ref 3.5–5.3)
POTASSIUM SERPL-SCNC: 5.5 MMOL/L — HIGH (ref 3.5–5.3)
RBC # BLD: 4.27 M/UL — SIGNIFICANT CHANGE UP (ref 4.2–5.8)
RBC # FLD: 24.6 % — HIGH (ref 10.3–14.5)
SODIUM SERPL-SCNC: 131 MMOL/L — LOW (ref 135–145)
WBC # BLD: 10.46 K/UL — SIGNIFICANT CHANGE UP (ref 3.8–10.5)
WBC # FLD AUTO: 10.46 K/UL — SIGNIFICANT CHANGE UP (ref 3.8–10.5)

## 2024-06-10 PROCEDURE — 99233 SBSQ HOSP IP/OBS HIGH 50: CPT

## 2024-06-10 RX ADMIN — CHLORHEXIDINE GLUCONATE 15 MILLILITER(S): 213 SOLUTION TOPICAL at 06:05

## 2024-06-10 RX ADMIN — SENNA PLUS 2 TABLET(S): 8.6 TABLET ORAL at 22:33

## 2024-06-10 RX ADMIN — ALBUTEROL 2.5 MILLIGRAM(S): 90 AEROSOL, METERED ORAL at 15:17

## 2024-06-10 RX ADMIN — Medication 100 MILLIGRAM(S): at 11:57

## 2024-06-10 RX ADMIN — ALBUTEROL 2.5 MILLIGRAM(S): 90 AEROSOL, METERED ORAL at 21:36

## 2024-06-10 RX ADMIN — LIDOCAINE 2 PATCH: 4 CREAM TOPICAL at 17:13

## 2024-06-10 RX ADMIN — Medication 2: at 06:06

## 2024-06-10 RX ADMIN — HUMAN INSULIN 4 UNIT(S): 100 INJECTION, SUSPENSION SUBCUTANEOUS at 06:06

## 2024-06-10 RX ADMIN — PANTOPRAZOLE SODIUM 40 MILLIGRAM(S): 20 TABLET, DELAYED RELEASE ORAL at 06:04

## 2024-06-10 RX ADMIN — Medication 2: at 17:15

## 2024-06-10 RX ADMIN — CHLORHEXIDINE GLUCONATE 1 APPLICATION(S): 213 SOLUTION TOPICAL at 06:04

## 2024-06-10 RX ADMIN — ATORVASTATIN CALCIUM 20 MILLIGRAM(S): 80 TABLET, FILM COATED ORAL at 22:33

## 2024-06-10 RX ADMIN — APIXABAN 5 MILLIGRAM(S): 2.5 TABLET, FILM COATED ORAL at 06:05

## 2024-06-10 RX ADMIN — APIXABAN 5 MILLIGRAM(S): 2.5 TABLET, FILM COATED ORAL at 17:13

## 2024-06-10 RX ADMIN — CHLORHEXIDINE GLUCONATE 15 MILLILITER(S): 213 SOLUTION TOPICAL at 17:13

## 2024-06-10 RX ADMIN — PANTOPRAZOLE SODIUM 40 MILLIGRAM(S): 20 TABLET, DELAYED RELEASE ORAL at 17:15

## 2024-06-10 RX ADMIN — Medication 100 MILLIGRAM(S): at 22:33

## 2024-06-10 RX ADMIN — Medication 300 MILLIGRAM(S): at 11:58

## 2024-06-10 RX ADMIN — Medication 200 MILLIGRAM(S): at 11:56

## 2024-06-10 RX ADMIN — ALBUTEROL 2.5 MILLIGRAM(S): 90 AEROSOL, METERED ORAL at 03:23

## 2024-06-10 RX ADMIN — ERYTHROPOIETIN 10000 UNIT(S): 10000 INJECTION, SOLUTION INTRAVENOUS; SUBCUTANEOUS at 13:30

## 2024-06-10 RX ADMIN — AMLODIPINE BESYLATE 10 MILLIGRAM(S): 2.5 TABLET ORAL at 06:05

## 2024-06-10 RX ADMIN — ALBUTEROL 2.5 MILLIGRAM(S): 90 AEROSOL, METERED ORAL at 11:37

## 2024-06-10 RX ADMIN — HUMAN INSULIN 4 UNIT(S): 100 INJECTION, SUSPENSION SUBCUTANEOUS at 11:59

## 2024-06-10 RX ADMIN — CARVEDILOL PHOSPHATE 25 MILLIGRAM(S): 80 CAPSULE, EXTENDED RELEASE ORAL at 17:16

## 2024-06-10 RX ADMIN — CARVEDILOL PHOSPHATE 25 MILLIGRAM(S): 80 CAPSULE, EXTENDED RELEASE ORAL at 06:05

## 2024-06-10 RX ADMIN — Medication 100 MILLIGRAM(S): at 06:04

## 2024-06-10 RX ADMIN — POLYETHYLENE GLYCOL 3350 17 GRAM(S): 17 POWDER, FOR SOLUTION ORAL at 11:58

## 2024-06-10 RX ADMIN — HUMAN INSULIN 4 UNIT(S): 100 INJECTION, SUSPENSION SUBCUTANEOUS at 17:15

## 2024-06-10 RX ADMIN — Medication 2: at 11:59

## 2024-06-10 NOTE — PROGRESS NOTE ADULT - SUBJECTIVE AND OBJECTIVE BOX
Patient is a 73y Male     Patient is a 73y old  Male who presents with a chief complaint of Unstable angina, abn EKG (09 Jun 2024 23:38)      HPI:  73-year-old male past medical history hypertension, ESRD on dialysis Monday Wednesday Friday, diabetes insulin-dependent presents with hiccups patient endorses persistent hiccups for the last 2 days. found to have peaked T waves, a new finding. denies dizziness, N/V, denies CP, palps, abd pain (29 Apr 2024 21:15)      PAST MEDICAL & SURGICAL HISTORY:  Diabetes      Benign essential HTN      HLD (hyperlipidemia)      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      ESRD on hemodialysis      Arteriovenous fistula          MEDICATIONS  (STANDING):  albuterol    0.083% 2.5 milliGRAM(s) Nebulizer every 6 hours  amLODIPine   Tablet 10 milliGRAM(s) Oral daily  apixaban 5 milliGRAM(s) Enteral Tube every 12 hours  atorvastatin 20 milliGRAM(s) Oral at bedtime  carvedilol 25 milliGRAM(s) Oral every 12 hours  chlorhexidine 0.12% Liquid 15 milliLiter(s) Oral Mucosa two times a day  chlorhexidine 2% Cloths 1 Application(s) Topical <User Schedule>  ciprofloxacin   IVPB 400 milliGRAM(s) IV Intermittent every 24 hours  ciprofloxacin   IVPB      dextrose 10% Bolus 125 milliLiter(s) IV Bolus once  dextrose 5%. 1000 milliLiter(s) (100 mL/Hr) IV Continuous <Continuous>  dextrose 5%. 1000 milliLiter(s) (50 mL/Hr) IV Continuous <Continuous>  dextrose 50% Injectable 25 Gram(s) IV Push once  dextrose 50% Injectable 12.5 Gram(s) IV Push once  epoetin reilly (EPOGEN) Injectable 37852 Unit(s) IV Push <User Schedule>  ferrous    sulfate Liquid 300 milliGRAM(s) Enteral Tube daily  glucagon  Injectable 1 milliGRAM(s) IntraMuscular once  hydrALAZINE 100 milliGRAM(s) Oral three times a day  insulin lispro (ADMELOG) corrective regimen sliding scale   SubCutaneous every 6 hours  insulin NPH human recombinant 4 Unit(s) SubCutaneous every 6 hours  lidocaine   4% Patch 2 Patch Transdermal every 24 hours  pantoprazole  Injectable 40 milliGRAM(s) IV Push every 12 hours  polyethylene glycol 3350 17 Gram(s) Oral daily  senna 2 Tablet(s) Oral at bedtime  sodium chloride 0.9% for Nebulization 3 milliLiter(s) Nebulizer every 6 hours      Allergies    No Known Allergies    Intolerances        SOCIAL HISTORY:  Denies ETOh,Smoking,     FAMILY HISTORY:  FHx: diabetes mellitus (Father, Aunt)        REVIEW OF SYSTEMS:    CONSTITUTIONAL: No weakness, fevers or chills  EYES/ENT: No visual changes;  No vertigo or throat pain   NECK: No pain or stiffness  RESPIRATORY: No cough, wheezing, hemoptysis; No shortness of breath  CARDIOVASCULAR: No chest pain or palpitations  GASTROINTESTINAL: No abdominal or epigastric pain. No nausea, vomiting, or hematemesis; No diarrhea or constipation. No melena or hematochezia.  GENITOURINARY: No dysuria, frequency or hematuria  NEUROLOGICAL: No numbness or weakness  SKIN: No itching, burning, rashes, or lesions   All other review of systems is negative unless indicated above.    VITAL:  T(C): , Max: 36.9 (06-09-24 @ 08:00)  T(F): , Max: 98.5 (06-09-24 @ 08:00)  HR: 55 (06-10-24 @ 06:00)  BP: 124/83 (06-10-24 @ 06:00)  BP(mean): --  RR: 20 (06-10-24 @ 06:00)  SpO2: 100% (06-10-24 @ 06:00)  Wt(kg): --    I and O's:    06-07 @ 07:01  -  06-08 @ 07:00  --------------------------------------------------------  IN: 2080 mL / OUT: 2700 mL / NET: -620 mL    06-08 @ 07:01  -  06-09 @ 07:00  --------------------------------------------------------  IN: 790 mL / OUT: 0 mL / NET: 790 mL    06-09 @ 07:01  -  06-10 @ 06:56  --------------------------------------------------------  IN: 1180 mL / OUT: 1 mL / NET: 1179 mL          PHYSICAL EXAM:    Constitutional: NAD  HEENT: PERRLA,   Neck: No JVD  Respiratory: CTA B/L  Cardiovascular: S1 and S2  Gastrointestinal: BS+, soft, NT/ND  Extremities: No peripheral edema  Neurological: A/O x 3, no focal deficits  Psychiatric: Normal mood, normal affect  : No Abbasi  Skin: No rashes  Access: Not applicable  Back: No CVA tenderness    LABS:                RADIOLOGY & ADDITIONAL STUDIES:

## 2024-06-10 NOTE — PROGRESS NOTE ADULT - NS ATTEND AMEND GEN_ALL_CORE FT
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 Now transferred to RCU 5/16 for further management.     Pt initially with acute encephalopathy concerning for baclofen toxicity followed by MRI Head (5/6) found to have left talya and left cerebellum ischemic infarct with no vessel occlusion/stenosis on MRA. Pt now clinically improved, encephalopathy resolving. Is able to communicate spontaneously but remains physically weakened from CVA and prolonged hospitalization with critical illness. PM&R consulted, dispo possibly to acute rehab pending repeat PT evaluation today. Continue on NOAC and statin. Neurology recs appreciated. PT/OT following.     Pt with acute combined hypoxemic and hypercapnic respiratory failure s/p ETT intubation/MV with failure to wean from ventilator requiring tracheostomy placement (IP 5/14). Pt now weaned to TC ATC (5/30) with PMV with dependent use. ABG on current settings wnl. Trach care and suctioning as per RCU team. Oral hygiene and aspiration precautions in place. Wean O2 supplementation for goal O2 saturation 90-95%. Airway clearance therapy in place.     Pt initially admitted with demand ischemia versus NSTEMI. EKG concerning for ST deviation/t-wave peak with (+) troponin leak. TTE (4/30) without regional wall abnormalities. No indication for catheterization at this time. Cardiology following and recs appreciated. Pt remains HD stable and clinically euvolemic. No events on telemetry. Cardiology following, recs appreciated.     Pt with oropharyngeal dysphagia s/p PEG placement (Surgery, 5/17). Pt tolerating feeds at goal rate. Pt with concern for melena on 5/26 with blood loss anemia, now resolved. GI consulted, but not a candidate for endoscopy given high surgical risk. Will CTM carefully. PPI BID continued. CBC q24hr.    Pt with ESRD via right AVF found to have stenosis requiring temporary access. Right Shiley removed (5/10) 2/2 bacteremia, replaced 5/13 and then a gain 5/27 2/2 recurrent fevers. Fevers resolved, pt with new Shiley 6/1. Pt s/p RUE fistuloplasty (6/4) now tolerating HD via AVF. HD as per nephrology team, tolerating well today.     Hospital course further c/b aspiration PNA c/b B. cereus bacteremia (5/10) with clearance of cx 5/12, s/p completion of Abx with Zosyn through 5/27 and Vancomycin through 5/21. Most recently pt with recurrent thick secretions now completing course of ciprofloxacin (6/7-6/11.)     Hospital course further c/b RLE DVT (5/12/24) now on NOAC since 6/6. Tolerating well.     Dispo pending medical optimization. Pt full code. DVT ppx full A/C with NOAC. Discussed with pt and wife at bedside today, will continue to update throughout hospitalization.

## 2024-06-10 NOTE — PROGRESS NOTE ADULT - SUBJECTIVE AND OBJECTIVE BOX
CHIEF COMPLAINT:Patient is a 73y old  Male who presents with a chief complaint of Unstable angina, abn EKG (10 Eze 2024 07:53)      INTERVAL EVENTS:     ROS: Seen by bedside during AM rounds     OBJECTIVE:  ICU Vital Signs Last 24 Hrs  T(C): 36.7 (10 Eze 2024 06:00), Max: 36.7 (09 Jun 2024 12:00)  T(F): 98.1 (10 Eze 2024 06:00), Max: 98.1 (09 Jun 2024 12:00)  HR: 55 (10 Eze 2024 06:00) (55 - 561)  BP: 124/83 (10 Eze 2024 06:00) (124/83 - 144/50)  BP(mean): --  ABP: --  ABP(mean): --  RR: 20 (10 Eze 2024 06:00) (18 - 20)  SpO2: 100% (10 Eze 2024 06:00) (98% - 100%)    O2 Parameters below as of 10 Eze 2024 06:00  Patient On (Oxygen Delivery Method): tracheostomy collar  O2 Flow (L/min): 6  O2 Concentration (%): 28          06-09 @ 07:01  -  06-10 @ 07:00  --------------------------------------------------------  IN: 1180 mL / OUT: 1 mL / NET: 1179 mL      CAPILLARY BLOOD GLUCOSE      POCT Blood Glucose.: 197 mg/dL (10 Eze 2024 05:29)      PHYSICAL EXAM:  General:   HEENT:   Lymph Nodes:  Neck:   Respiratory:   Cardiovascular:   Abdomen:   Extremities:   Skin:   Neurological:  Psychiatry:        HOSPITAL MEDICATIONS:  MEDICATIONS  (STANDING):  albuterol    0.083% 2.5 milliGRAM(s) Nebulizer every 6 hours  amLODIPine   Tablet 10 milliGRAM(s) Oral daily  apixaban 5 milliGRAM(s) Enteral Tube every 12 hours  atorvastatin 20 milliGRAM(s) Oral at bedtime  carvedilol 25 milliGRAM(s) Oral every 12 hours  chlorhexidine 0.12% Liquid 15 milliLiter(s) Oral Mucosa two times a day  chlorhexidine 2% Cloths 1 Application(s) Topical <User Schedule>  ciprofloxacin   IVPB 400 milliGRAM(s) IV Intermittent every 24 hours  ciprofloxacin   IVPB      dextrose 10% Bolus 125 milliLiter(s) IV Bolus once  dextrose 5%. 1000 milliLiter(s) (100 mL/Hr) IV Continuous <Continuous>  dextrose 5%. 1000 milliLiter(s) (50 mL/Hr) IV Continuous <Continuous>  dextrose 50% Injectable 25 Gram(s) IV Push once  dextrose 50% Injectable 12.5 Gram(s) IV Push once  epoetin reilly (EPOGEN) Injectable 92854 Unit(s) IV Push <User Schedule>  ferrous    sulfate Liquid 300 milliGRAM(s) Enteral Tube daily  glucagon  Injectable 1 milliGRAM(s) IntraMuscular once  hydrALAZINE 100 milliGRAM(s) Oral three times a day  insulin lispro (ADMELOG) corrective regimen sliding scale   SubCutaneous every 6 hours  insulin NPH human recombinant 4 Unit(s) SubCutaneous every 6 hours  lidocaine   4% Patch 2 Patch Transdermal every 24 hours  pantoprazole  Injectable 40 milliGRAM(s) IV Push every 12 hours  polyethylene glycol 3350 17 Gram(s) Oral daily  senna 2 Tablet(s) Oral at bedtime  sodium chloride 0.9% for Nebulization 3 milliLiter(s) Nebulizer every 6 hours    MEDICATIONS  (PRN):  acetaminophen   Oral Liquid .. 650 milliGRAM(s) Oral every 6 hours PRN Temp greater or equal to 38C (100.4F), Moderate Pain (4 - 6)  dextrose Oral Gel 15 Gram(s) Oral once PRN Blood Glucose LESS THAN 70 milliGRAM(s)/deciliter  hydrALAZINE Injectable 10 milliGRAM(s) IV Push every 8 hours PRN SBP > 180  sodium chloride 0.9% lock flush 10 milliLiter(s) IV Push every 1 hour PRN Pre/post blood products, medications, blood draw, and to maintain line patency      LABS:                        9.7    10.46 )-----------( 334      ( 10 Eze 2024 05:40 )             31.4                  CHIEF COMPLAINT:Patient is a 73y old  Male who presents with a chief complaint of Unstable angina, abn EKG (10 Eze 2024 07:53)      INTERVAL EVENTS:     ROS: Seen by bedside during AM rounds     OBJECTIVE:  ICU Vital Signs Last 24 Hrs  T(C): 36.7 (10 Eze 2024 06:00), Max: 36.7 (09 Jun 2024 12:00)  T(F): 98.1 (10 Eze 2024 06:00), Max: 98.1 (09 Jun 2024 12:00)  HR: 55 (10 Eze 2024 06:00) (55 - 561)  BP: 124/83 (10 Eze 2024 06:00) (124/83 - 144/50)  BP(mean): --  ABP: --  ABP(mean): --  RR: 20 (10 Eze 2024 06:00) (18 - 20)  SpO2: 100% (10 Eze 2024 06:00) (98% - 100%)    O2 Parameters below as of 10 Eze 2024 06:00  Patient On (Oxygen Delivery Method): tracheostomy collar  O2 Flow (L/min): 6  O2 Concentration (%): 28          06-09 @ 07:01  -  06-10 @ 07:00  --------------------------------------------------------  IN: 1180 mL / OUT: 1 mL / NET: 1179 mL      CAPILLARY BLOOD GLUCOSE      POCT Blood Glucose.: 197 mg/dL (10 Eze 2024 05:29)      PHYSICAL EXAM:  General: NAD   Neck: (+) Trach tube noted, site c/d/i.  Cards: S1/S2, no murmurs   Pulm: CTA bilaterally. No wheezes.   Abdomen: Soft, NTND. (+) PEG noted, site c/d/i.   Extremities: RUE AVF. Ext warm to touch.  No pedal edema. Intact distal pulses. Extremities warm to touch.  Neurology: Awake/alert. Follows commands. Nods head to questioning. 5/5 motor strength x4E.   Skin: warm to touch, color appropriate for ethnicity. Refer to RN assessment for further details.        HOSPITAL MEDICATIONS:  MEDICATIONS  (STANDING):  albuterol    0.083% 2.5 milliGRAM(s) Nebulizer every 6 hours  amLODIPine   Tablet 10 milliGRAM(s) Oral daily  apixaban 5 milliGRAM(s) Enteral Tube every 12 hours  atorvastatin 20 milliGRAM(s) Oral at bedtime  carvedilol 25 milliGRAM(s) Oral every 12 hours  chlorhexidine 0.12% Liquid 15 milliLiter(s) Oral Mucosa two times a day  chlorhexidine 2% Cloths 1 Application(s) Topical <User Schedule>  ciprofloxacin   IVPB 400 milliGRAM(s) IV Intermittent every 24 hours  ciprofloxacin   IVPB      dextrose 10% Bolus 125 milliLiter(s) IV Bolus once  dextrose 5%. 1000 milliLiter(s) (100 mL/Hr) IV Continuous <Continuous>  dextrose 5%. 1000 milliLiter(s) (50 mL/Hr) IV Continuous <Continuous>  dextrose 50% Injectable 25 Gram(s) IV Push once  dextrose 50% Injectable 12.5 Gram(s) IV Push once  epoetin reilly (EPOGEN) Injectable 41388 Unit(s) IV Push <User Schedule>  ferrous    sulfate Liquid 300 milliGRAM(s) Enteral Tube daily  glucagon  Injectable 1 milliGRAM(s) IntraMuscular once  hydrALAZINE 100 milliGRAM(s) Oral three times a day  insulin lispro (ADMELOG) corrective regimen sliding scale   SubCutaneous every 6 hours  insulin NPH human recombinant 4 Unit(s) SubCutaneous every 6 hours  lidocaine   4% Patch 2 Patch Transdermal every 24 hours  pantoprazole  Injectable 40 milliGRAM(s) IV Push every 12 hours  polyethylene glycol 3350 17 Gram(s) Oral daily  senna 2 Tablet(s) Oral at bedtime  sodium chloride 0.9% for Nebulization 3 milliLiter(s) Nebulizer every 6 hours    MEDICATIONS  (PRN):  acetaminophen   Oral Liquid .. 650 milliGRAM(s) Oral every 6 hours PRN Temp greater or equal to 38C (100.4F), Moderate Pain (4 - 6)  dextrose Oral Gel 15 Gram(s) Oral once PRN Blood Glucose LESS THAN 70 milliGRAM(s)/deciliter  hydrALAZINE Injectable 10 milliGRAM(s) IV Push every 8 hours PRN SBP > 180  sodium chloride 0.9% lock flush 10 milliLiter(s) IV Push every 1 hour PRN Pre/post blood products, medications, blood draw, and to maintain line patency      LABS:                        9.7    10.46 )-----------( 334      ( 10 Eze 2024 05:40 )             31.4                  CHIEF COMPLAINT:Patient is a 73y old  Male who presents with a chief complaint of Unstable angina, abn EKG (10 Eze 2024 07:53)      INTERVAL EVENTS: no overnight events. Tele reviewed, SB rate 50s.    ROS: Seen by bedside during AM rounds     OBJECTIVE:  ICU Vital Signs Last 24 Hrs  T(C): 36.7 (10 Eze 2024 06:00), Max: 36.7 (09 Jun 2024 12:00)  T(F): 98.1 (10 Eze 2024 06:00), Max: 98.1 (09 Jun 2024 12:00)  HR: 55 (10 Eze 2024 06:00) (55 - 561)  BP: 124/83 (10 Eze 2024 06:00) (124/83 - 144/50)  BP(mean): --  ABP: --  ABP(mean): --  RR: 20 (10 Eze 2024 06:00) (18 - 20)  SpO2: 100% (10 Eze 2024 06:00) (98% - 100%)    O2 Parameters below as of 10 Eze 2024 06:00  Patient On (Oxygen Delivery Method): tracheostomy collar  O2 Flow (L/min): 6  O2 Concentration (%): 28          06-09 @ 07:01  -  06-10 @ 07:00  --------------------------------------------------------  IN: 1180 mL / OUT: 1 mL / NET: 1179 mL      CAPILLARY BLOOD GLUCOSE      POCT Blood Glucose.: 197 mg/dL (10 Eze 2024 05:29)      PHYSICAL EXAM:  General: NAD   Neck: (+) Trach tube noted, site c/d/i.  Cards: S1/S2, no murmurs   Pulm: CTA bilaterally. No wheezes.   Abdomen: Soft, NTND. (+) PEG noted, site c/d/i.   Extremities: RUE AVF. Ext warm to touch.  No pedal edema. Intact distal pulses. Extremities warm to touch.  Neurology: Awake/alert. Follows commands. Nods head to questioning. 5/5 motor strength x4E.   Skin: warm to touch, color appropriate for ethnicity. Refer to RN assessment for further details.        HOSPITAL MEDICATIONS:  MEDICATIONS  (STANDING):  albuterol    0.083% 2.5 milliGRAM(s) Nebulizer every 6 hours  amLODIPine   Tablet 10 milliGRAM(s) Oral daily  apixaban 5 milliGRAM(s) Enteral Tube every 12 hours  atorvastatin 20 milliGRAM(s) Oral at bedtime  carvedilol 25 milliGRAM(s) Oral every 12 hours  chlorhexidine 0.12% Liquid 15 milliLiter(s) Oral Mucosa two times a day  chlorhexidine 2% Cloths 1 Application(s) Topical <User Schedule>  ciprofloxacin   IVPB 400 milliGRAM(s) IV Intermittent every 24 hours  ciprofloxacin   IVPB      dextrose 10% Bolus 125 milliLiter(s) IV Bolus once  dextrose 5%. 1000 milliLiter(s) (100 mL/Hr) IV Continuous <Continuous>  dextrose 5%. 1000 milliLiter(s) (50 mL/Hr) IV Continuous <Continuous>  dextrose 50% Injectable 25 Gram(s) IV Push once  dextrose 50% Injectable 12.5 Gram(s) IV Push once  epoetin reilly (EPOGEN) Injectable 66186 Unit(s) IV Push <User Schedule>  ferrous    sulfate Liquid 300 milliGRAM(s) Enteral Tube daily  glucagon  Injectable 1 milliGRAM(s) IntraMuscular once  hydrALAZINE 100 milliGRAM(s) Oral three times a day  insulin lispro (ADMELOG) corrective regimen sliding scale   SubCutaneous every 6 hours  insulin NPH human recombinant 4 Unit(s) SubCutaneous every 6 hours  lidocaine   4% Patch 2 Patch Transdermal every 24 hours  pantoprazole  Injectable 40 milliGRAM(s) IV Push every 12 hours  polyethylene glycol 3350 17 Gram(s) Oral daily  senna 2 Tablet(s) Oral at bedtime  sodium chloride 0.9% for Nebulization 3 milliLiter(s) Nebulizer every 6 hours    MEDICATIONS  (PRN):  acetaminophen   Oral Liquid .. 650 milliGRAM(s) Oral every 6 hours PRN Temp greater or equal to 38C (100.4F), Moderate Pain (4 - 6)  dextrose Oral Gel 15 Gram(s) Oral once PRN Blood Glucose LESS THAN 70 milliGRAM(s)/deciliter  hydrALAZINE Injectable 10 milliGRAM(s) IV Push every 8 hours PRN SBP > 180  sodium chloride 0.9% lock flush 10 milliLiter(s) IV Push every 1 hour PRN Pre/post blood products, medications, blood draw, and to maintain line patency      LABS:                        9.7    10.46 )-----------( 334      ( 10 Eze 2024 05:40 )             31.4

## 2024-06-10 NOTE — PROGRESS NOTE ADULT - SUBJECTIVE AND OBJECTIVE BOX
[facility] [place] [facility]   MD ELIANE BHAGAT MD ANGELA WONG, PA    TEL:  OFFICE: [phone]  From 5pm-7am Answering Service [phone]    -- RENAL FOLLOW UP NOTE ---Date of Service 06-10-24 @ 13:39    Patient is a 73y old  Male who presents with a chief complaint of Unstable angina, abn EKG (10 Eze 2024 08:17)      Patient seen and examined at bedside. No chest pain/sob    VITALS:  T(F): 98.2 (06-10-24 @ 10:44), Max: 98.2 (06-10-24 @ 10:44)  HR: 54 (06-10-24 @ 11:38)  BP: 129/54 (06-10-24 @ 10:44)  RR: 20 (06-10-24 @ 10:44)  SpO2: 98% (06-10-24 @ 11:38)  Wt(kg): --    06-09 @ 07:01  -  06-10 @ 07:00  --------------------------------------------------------  IN: 1180 mL / OUT: 1 mL / NET: 1179 mL          PHYSICAL EXAM:  General: NAD  Neck: trach  Respiratory: CTAB, no wheezes, rales or rhonchi  Cardiovascular: S1, S2, RRR  Gastrointestinal: BS+, +peg soft NT  Extremities: No peripheral edema    Hospital Medications:   MEDICATIONS  (STANDING):  albuterol    0.083% 2.5 milliGRAM(s) Nebulizer every 6 hours  amLODIPine   Tablet 10 milliGRAM(s) Oral daily  apixaban 5 milliGRAM(s) Enteral Tube every 12 hours  atorvastatin 20 milliGRAM(s) Oral at bedtime  carvedilol 25 milliGRAM(s) Oral every 12 hours  chlorhexidine 0.12% Liquid 15 milliLiter(s) Oral Mucosa two times a day  chlorhexidine 2% Cloths 1 Application(s) Topical <User Schedule>  ciprofloxacin   IVPB 400 milliGRAM(s) IV Intermittent every 24 hours  ciprofloxacin   IVPB      dextrose 10% Bolus 125 milliLiter(s) IV Bolus once  dextrose 5%. 1000 milliLiter(s) (100 mL/Hr) IV Continuous <Continuous>  dextrose 5%. 1000 milliLiter(s) (50 mL/Hr) IV Continuous <Continuous>  dextrose 50% Injectable 25 Gram(s) IV Push once  dextrose 50% Injectable 12.5 Gram(s) IV Push once  epoetin reilly (EPOGEN) Injectable 49102 Unit(s) IV Push <User Schedule>  ferrous    sulfate Liquid 300 milliGRAM(s) Enteral Tube daily  glucagon  Injectable 1 milliGRAM(s) IntraMuscular once  hydrALAZINE 100 milliGRAM(s) Oral three times a day  insulin lispro (ADMELOG) corrective regimen sliding scale   SubCutaneous every 6 hours  insulin NPH human recombinant 4 Unit(s) SubCutaneous every 6 hours  lidocaine   4% Patch 2 Patch Transdermal every 24 hours  pantoprazole  Injectable 40 milliGRAM(s) IV Push every 12 hours  polyethylene glycol 3350 17 Gram(s) Oral daily  senna 2 Tablet(s) Oral at bedtime  sodium chloride 0.9% for Nebulization 3 milliLiter(s) Nebulizer every 6 hours      LABS:  06-10    131<L>  |  92<L>  |  71<H>  ----------------------------<  174<H>  5.5<H>   |  21<L>  |  5.16<H>    Ca    8.6      10 Eze 2024 05:40  Phos  2.7     06-10  Mg     2.50     06-10      Creatinine Trend: 5.16 <--, 2.77 <--, 4.72 <--, 3.55 <--, 4.69 <--, 5.15 <--, 4.13 <--    Phosphorus: 2.7 mg/dL (06-10 @ 05:40)                              9.7    10.46 )-----------( 334      ( 10 Eze 2024 05:40 )             31.4     Urine Studies:  Urinalysis - [06-10-24 @ 05:40]      Color  / Appearance  / SG  / pH       Gluc 174 / Ketone   / Bili  / Urobili        Blood  / Protein  / Leuk Est  / Nitrite       RBC  / WBC  / Hyaline  / Gran  / Sq Epi  / Non Sq Epi  / Bacteria       Iron 16, TIBC 121, %sat 13      [05-17-24 @ 06:00]  Ferritin 720      [05-17-24 @ 06:00]  PTH -- (Ca --)      [05-26-24 @ 01:20]   122  TSH 2.60      [05-17-24 @ 06:00]  Lipid: chol 86, , HDL 27, LDL --      [05-17-24 @ 06:00]    HBsAb <3.0      [06-03-24 @ 16:20]  HBsAg Nonreact      [06-03-24 @ 16:20]  HBcAb Nonreact      [06-03-24 @ 16:20]  HCV 0.09, Nonreact      [06-03-24 @ 16:20]      RADIOLOGY & ADDITIONAL STUDIES:

## 2024-06-10 NOTE — PROGRESS NOTE ADULT - ASSESSMENT
74 YO M with PMHx of ESRD on HD MWF, HTN, and 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 Baclofen started and course complicated by AMS second to baclofen toxicity requiring intubation and MICU transfer. Course further complicated by LEFT pontine and cerebellar stroke, R AVF stenosis, aspiration and B Cereus bacteremia and RLE DVT. s/p trach and transferred to RCU 5/16 and course complicated by intermittent fever spikes with likely aspiration citrobacter PNA, AOCD, and GIB vs Fe stools. s/p fistulogram 6/4 and able to wean off vent to TC ATC.     NEUROLOGY  # AMS   - MRI HEAD (5/6) with punctate foci in the left talya and left cerebellum with concern for acute infarct.   - MRA HEAD and NECK (5/6) with no large vessel occlusion or stenosis.  - Continue on Lipitor for medical management     PSYCH   # Depression   - Concern for depression with questionable SI   - CO started, however formal discussion and evaluation with no true SI   - Supportive care and encouragement QD    CARDIOVASCULAR  # CP with NSTEMI < demand ischemia with HTN   - Admitted for CP and HTN with peaked T WAVES and ECG with ST deviation on admission   - Troponins mildly elevated on admission with negative CKMB   - TTE (4/30) with EF 72, normal LVRVSF, and no regional wall motion abnormalities   - No need to pursue cath at this time     # Septic vs vasoplegic shock  # Hx of HTN   - Home BP medications held and pressors weaned off   - Continue on coreg 25 (increased 6/1), hydralazine 100 TID and Norvasc 10mg (increased 5/31)   - Monitor blood pressures with Hydralazine 10mg IVP Q8H PRN   - IF remains hypertensive then add Isordil     RESPIRATORY  # Respiratory failure   - AMS noted with baclofen toxicity requiring intubation   - Attempted extubation in MICU however course complicated by aspiration and prolonged course and now s/p tracheostomy with IP on 5/14  - Continued on vent 12/500/5/30 and weaned to TC ATC   - Continue on TC ATC (5/30 - )   - Able to tolerate PMV during the day with good phonation   - Copious secretions this secretions but hold HTS as strong cough and continue on albuterol and NS 0.9%    GI  # Dysphagia   - s/p surgical PEG 5/17   - Failed green dye on 6/1 and continue on tube feeds via PEG   - Continue with PMV during the day this week and reattempt SS sometime this week.     # GIB  - Noted with several episodes of black stool with drop in Hgb (5/26 overnight) requiring PRBC with appropriate response  - Case discussed with GI, questionable anemia from AOCD with ESRD and black stool likely from iron, and no plan for scope at this time  - Continue on PPI and monitor stools    RENAL  # ESRD on HD MWF with R BCF  # Fistula stenosis   - VA AVF (5/2) with Right radiocephalic arteriovenous fistula has no hemodynamically significant stenosis present at the anastomotic site ( cm/sec, EDV 49 cm/sec). The usable segment is partially thrombosed with minimal patency.  - Required R FEMORAL line on medicine, then removed on 5/2, and replaced with R IJ SHILEY on 5/3 for CRRT then converted back to iHD MWF   - R IJ SHILEY removed on 5/10 second to bacteremia and replaced on 5/13  - R IJ SHILEY removed 5/27 given intermittent fevers    - Blood cultures negative and replaced SHILEY on 6/1   - Vein mapping with no adequate veins in UE for conduit (all <2 mm and/or thrombosed) and pending possible revision of RUE AVF.   - s/p RUE Fistuloplasty on 6/4   - s/p Successfully HD via RUE AVF on 6/5  - s/p R IJ Shiley removed 6/6  - Continue on HD MWF per Renal     # Hyperphosphatemia   - Renvela 1600 however phos corrects well with HD   - Monitor off Renvela     INFECTIOUS DISEASE  # Citrobacter aspiration vs trachitis concern   - Recurrent fever spike and CXR with RLL opacity   - BCx (5/25 and 5/30) negative   - SCx (5/25) negative   - SCx (5/30) with citrobacter and completed Zosyn empirically (5/18 - 5/28)  - Thick secretions without fever and RPT SCx negative, however will empirically tx with cipro (6/7 - )     # B Cereus Bacteremia   - Course complicated by fevers and aspiration event   - MRSA (5/1) negative   - BCx (5/2) negative   - SCx (5/4) and BAL (5/12) negative   - BCx (5/10) with bacillus cereus and cleared on (5/12).   - Case discussed with ID and s/p Vancomycin through 5/21 x 10 day course.   - Continue to monitor off antibiotics per ID     HEME  # AOCD with ESRD   - Anemia panel with AOCD and low % sat   - Continue on EPO 10K and Ferrous supplement  - s/p PRBC on 5/16, 5/26 and 6/7 and will monitor HH     VASCULAR   # RLE DVT   - CT AP (5/12) with nonocclusive RIGHT common iliac vein deep venous thrombosis and case discussed with IR with no plans for IVC filter.   - RLE duplex 5/28 with no evidence of DVT  - Initially started on a Heparin GTT with course complicated by questionable GIB given dark tarry stools, however more likely second to iron tabs.   - Discontinued heparin GTT 6/6 and transitioned to Eliquis    ENDOCRINE  # IDDM2 A1C 6.9  - c/w NPH 4U Q6 and ISS  - Monitor and adjust insulin based on FS     SKIN  - R FEMORAL (5/1-5/2)   - R IJ SHILEY (5/3-5/10)   - R IJ SHILEY (5/13 - 5/27)   - R IJ SHILEY (6/1 - 6/6)     ETHICS/ GOC    - FULL CODE     DISPO - PMR recc ACUTE

## 2024-06-10 NOTE — PROGRESS NOTE ADULT - SUBJECTIVE AND OBJECTIVE BOX
Patient is a 73y old  Male who presents with a chief complaint of Unstable angina, abn EKG (10 Eze 2024 17:16)      SUBJECTIVE / OVERNIGHT EVENTS: no new events     MEDICATIONS  (STANDING):  albuterol    0.083% 2.5 milliGRAM(s) Nebulizer every 6 hours  amLODIPine   Tablet 10 milliGRAM(s) Oral daily  apixaban 5 milliGRAM(s) Enteral Tube every 12 hours  atorvastatin 20 milliGRAM(s) Oral at bedtime  carvedilol 25 milliGRAM(s) Oral every 12 hours  chlorhexidine 0.12% Liquid 15 milliLiter(s) Oral Mucosa two times a day  chlorhexidine 2% Cloths 1 Application(s) Topical <User Schedule>  ciprofloxacin   IVPB 400 milliGRAM(s) IV Intermittent every 24 hours  ciprofloxacin   IVPB      dextrose 10% Bolus 125 milliLiter(s) IV Bolus once  dextrose 5%. 1000 milliLiter(s) (100 mL/Hr) IV Continuous <Continuous>  dextrose 5%. 1000 milliLiter(s) (50 mL/Hr) IV Continuous <Continuous>  dextrose 50% Injectable 25 Gram(s) IV Push once  dextrose 50% Injectable 12.5 Gram(s) IV Push once  epoetin reilly (EPOGEN) Injectable 10005 Unit(s) IV Push <User Schedule>  ferrous    sulfate Liquid 300 milliGRAM(s) Enteral Tube daily  glucagon  Injectable 1 milliGRAM(s) IntraMuscular once  hydrALAZINE 100 milliGRAM(s) Oral three times a day  insulin lispro (ADMELOG) corrective regimen sliding scale   SubCutaneous every 6 hours  insulin NPH human recombinant 4 Unit(s) SubCutaneous every 6 hours  lidocaine   4% Patch 2 Patch Transdermal every 24 hours  pantoprazole  Injectable 40 milliGRAM(s) IV Push every 12 hours  polyethylene glycol 3350 17 Gram(s) Oral daily  senna 2 Tablet(s) Oral at bedtime  sodium chloride 0.9% for Nebulization 3 milliLiter(s) Nebulizer every 6 hours    MEDICATIONS  (PRN):  acetaminophen   Oral Liquid .. 650 milliGRAM(s) Oral every 6 hours PRN Temp greater or equal to 38C (100.4F), Moderate Pain (4 - 6)  dextrose Oral Gel 15 Gram(s) Oral once PRN Blood Glucose LESS THAN 70 milliGRAM(s)/deciliter  hydrALAZINE Injectable 10 milliGRAM(s) IV Push every 8 hours PRN SBP > 180  sodium chloride 0.9% lock flush 10 milliLiter(s) IV Push every 1 hour PRN Pre/post blood products, medications, blood draw, and to maintain line patency      Vital Signs Last 24 Hrs  T(F): 98.3 (06-10-24 @ 20:00), Max: 98.3 (06-10-24 @ 16:00)  HR: 54 (06-10-24 @ 20:00) (53 - 56)  BP: 119/60 (06-10-24 @ 20:00) (119/60 - 150/61)  RR: 20 (06-10-24 @ 20:00) (18 - 20)  SpO2: 99% (06-10-24 @ 20:00) (98% - 100%)  Telemetry:   CAPILLARY BLOOD GLUCOSE      POCT Blood Glucose.: 158 mg/dL (10 Eze 2024 16:58)  POCT Blood Glucose.: 167 mg/dL (10 Eze 2024 11:19)  POCT Blood Glucose.: 197 mg/dL (10 Eze 2024 05:29)  POCT Blood Glucose.: 199 mg/dL (09 Jun 2024 23:25)    I&O's Summary    09 Jun 2024 07:01  -  10 Eze 2024 07:00  --------------------------------------------------------  IN: 1180 mL / OUT: 1 mL / NET: 1179 mL    10 Eze 2024 07:01  -  10 Eze 2024 23:18  --------------------------------------------------------  IN: 400 mL / OUT: 2400 mL / NET: -2000 mL        PHYSICAL EXAM:  GENERAL: NAD, well-developed  HEAD:  Atraumatic, Normocephalic  EYES: EOMI, PERRLA, conjunctiva and sclera clear  NECK: Supple, No JVD  CHEST/LUNG: Clear to auscultation bilaterally; No wheeze  HEART: Regular rate and rhythm; No murmurs, rubs, or gallops  ABDOMEN: Soft, Nontender, Nondistended; Bowel sounds present  EXTREMITIES:  2+ Peripheral Pulses, No clubbing, cyanosis, or edema  PSYCH: AAOx3  NEUROLOGY: non-focal  SKIN: No rashes or lesions    LABS:                        9.7    10.46 )-----------( 334      ( 10 Eze 2024 05:40 )             31.4     06-10    131<L>  |  92<L>  |  71<H>  ----------------------------<  174<H>  5.5<H>   |  21<L>  |  5.16<H>    Ca    8.6      10 Eze 2024 05:40  Phos  2.7     06-10  Mg     2.50     06-10            Urinalysis Basic - ( 10 Eze 2024 05:40 )    Color: x / Appearance: x / SG: x / pH: x  Gluc: 174 mg/dL / Ketone: x  / Bili: x / Urobili: x   Blood: x / Protein: x / Nitrite: x   Leuk Esterase: x / RBC: x / WBC x   Sq Epi: x / Non Sq Epi: x / Bacteria: x        RADIOLOGY & ADDITIONAL TESTS:    Imaging Personally Reviewed:    Consultant(s) Notes Reviewed:      Care Discussed with Consultants/Other Providers:

## 2024-06-10 NOTE — PROGRESS NOTE ADULT - SUBJECTIVE AND OBJECTIVE BOX
Cardiovascular Disease Progress Note  DATE OF SERVICE: 06-10-24 @ 07:53    Overnight events: No acute events overnight.    The patient is in no distress on trach collar.   Otherwise review of systems negative    Objective Findings:  T(C): 36.7 (06-10-24 @ 06:00), Max: 36.9 (06-09-24 @ 08:00)  HR: 55 (06-10-24 @ 06:00) (55 - 561)  BP: 124/83 (06-10-24 @ 06:00) (118/48 - 144/50)  RR: 20 (06-10-24 @ 06:00) (18 - 20)  SpO2: 100% (06-10-24 @ 06:00) (98% - 100%)  Wt(kg): --  Daily     Daily       Physical Exam:  Gen: NAD; Patient resting comfortably  HEENT:  Normocephalic/ atraumatic  CV: RRR, normal S1 + S2, no m/r/g  Lungs:  Normal respiratory effort; fair air entry to auscultation bilaterally  Abd: soft, non-tender; bowel sounds present  Ext: No edema; warm and well perfused    Telemetry: n/a    Laboratory Data:                        9.7    10.46 )-----------( 334      ( 10 Eze 2024 05:40 )             31.4                     Inpatient Medications:  MEDICATIONS  (STANDING):  albuterol    0.083% 2.5 milliGRAM(s) Nebulizer every 6 hours  amLODIPine   Tablet 10 milliGRAM(s) Oral daily  apixaban 5 milliGRAM(s) Enteral Tube every 12 hours  atorvastatin 20 milliGRAM(s) Oral at bedtime  carvedilol 25 milliGRAM(s) Oral every 12 hours  chlorhexidine 0.12% Liquid 15 milliLiter(s) Oral Mucosa two times a day  chlorhexidine 2% Cloths 1 Application(s) Topical <User Schedule>  ciprofloxacin   IVPB 400 milliGRAM(s) IV Intermittent every 24 hours  ciprofloxacin   IVPB      dextrose 10% Bolus 125 milliLiter(s) IV Bolus once  dextrose 5%. 1000 milliLiter(s) (100 mL/Hr) IV Continuous <Continuous>  dextrose 5%. 1000 milliLiter(s) (50 mL/Hr) IV Continuous <Continuous>  dextrose 50% Injectable 25 Gram(s) IV Push once  dextrose 50% Injectable 12.5 Gram(s) IV Push once  epoetin reilly (EPOGEN) Injectable 08937 Unit(s) IV Push <User Schedule>  ferrous    sulfate Liquid 300 milliGRAM(s) Enteral Tube daily  glucagon  Injectable 1 milliGRAM(s) IntraMuscular once  hydrALAZINE 100 milliGRAM(s) Oral three times a day  insulin lispro (ADMELOG) corrective regimen sliding scale   SubCutaneous every 6 hours  insulin NPH human recombinant 4 Unit(s) SubCutaneous every 6 hours  lidocaine   4% Patch 2 Patch Transdermal every 24 hours  pantoprazole  Injectable 40 milliGRAM(s) IV Push every 12 hours  polyethylene glycol 3350 17 Gram(s) Oral daily  senna 2 Tablet(s) Oral at bedtime  sodium chloride 0.9% for Nebulization 3 milliLiter(s) Nebulizer every 6 hours      Assessment: 73 year old man with HTN, HLD, T2DM on insulin, and ESRD on HD presents with supply demand ischemia and angina.    Plan of Care:    #Type II myocardial infarction-  Secondary to distributive shock earlier on admission.   The repeat echo shows no new wall motion abnormality.   I would not pursue cath at this time given debility and chronic respiratory failure s/p trach 5/14.       #HTN-  BP acceptable on current regimen.        #ESRD-  HD as per renal     #DVT   - R common Iliac DVT  AC as per RCU.      #ACP (advance care planning)-  Advanced care planning was discussed with Mr. Art.  Cardiac findings were discussed and all questions were answered.         Over 55 minutes spent on total encounter; more than 50% of the visit was spent counseling and/or coordinating care by the attending physician.      Geovani Bravo MD [facility]  Cardiovascular Disease  [phone]

## 2024-06-10 NOTE — PROGRESS NOTE ADULT - ASSESSMENT
73-year-old male past medical history hypertension, ESRD on dialysis Monday Wednesday Friday, diabetes insulin-dependent presents with hiccups patient endorses persistent hiccups for the last 2 days. Nephrology consulted for HD needs.    A/P  ESRD:  Center: [place]  Nephrologist: Dr. Hardwick  Access: R AVF nonfunction now s/p thrombectomy 6/4.  HD via AVF working well 6/5;  shiley removed 6/6  hd today via avf  Continue MWF  Consent obtained and placed in ED chart  Renal diet  Monitor BMP - no labs today.  Consider [facility] for rehab where his outpatient Nephrologist, Dr. Hardwick can follow him.    Hyperkalemia/Hypokalemia  Improved w/ HD.  C/W HD schedule as above.  Lokelma 10gm PRN for K >5.3 on non-HD days  Low K diet.  Monitor closely - no labs today.    HTN:  BP  controlled.  On carvedilol 12.5mg BID, hydralazine 100mg TID.  On norvasc -- > down titrate if BP remains marginal / controlled.  UF w/ HD as BP permits.  Monitor BP.    Anemia:  Hgb stable  + iron deficiency.  Tsat 13% - on ferrous sulfate 300mg qd via PEG.  Venofer held d/t leukocytosis.  SILVA w/ HD.  Transfuse for Hgb <7.  Monitor Hb.    CKD-MBD   - low for CKD.  PO4 low; repleted w/ PhosNaK on 6/8.  Supplement PO4 as needed.  Sevelamer 1600mg TID d/c'ed 5/21.  Monitor Ca, PO4 daily    Hypocalcemia:  In setting of CKD vs. hypoalbuminemia.  Optimize albumin.  Corrected Ca WNL.  Replete as needed.  Monitor Ca.    Hyponatremia  in setting of esrd   UF w/ HD.  Free fluid restriction to 1L/day.  monitor

## 2024-06-10 NOTE — PROGRESS NOTE ADULT - SUBJECTIVE AND OBJECTIVE BOX
Neurology Progress Note    S: Patient seen and examined.     Medications: MEDICATIONS  (STANDING):  albuterol    0.083% 2.5 milliGRAM(s) Nebulizer every 6 hours  amLODIPine   Tablet 10 milliGRAM(s) Oral daily  apixaban 5 milliGRAM(s) Enteral Tube every 12 hours  atorvastatin 20 milliGRAM(s) Oral at bedtime  carvedilol 25 milliGRAM(s) Oral every 12 hours  chlorhexidine 0.12% Liquid 15 milliLiter(s) Oral Mucosa two times a day  chlorhexidine 2% Cloths 1 Application(s) Topical <User Schedule>  ciprofloxacin   IVPB 400 milliGRAM(s) IV Intermittent every 24 hours  ciprofloxacin   IVPB      dextrose 10% Bolus 125 milliLiter(s) IV Bolus once  dextrose 5%. 1000 milliLiter(s) (100 mL/Hr) IV Continuous <Continuous>  dextrose 5%. 1000 milliLiter(s) (50 mL/Hr) IV Continuous <Continuous>  dextrose 50% Injectable 25 Gram(s) IV Push once  dextrose 50% Injectable 12.5 Gram(s) IV Push once  epoetin reilly (EPOGEN) Injectable 67079 Unit(s) IV Push <User Schedule>  ferrous    sulfate Liquid 300 milliGRAM(s) Enteral Tube daily  glucagon  Injectable 1 milliGRAM(s) IntraMuscular once  hydrALAZINE 100 milliGRAM(s) Oral three times a day  insulin lispro (ADMELOG) corrective regimen sliding scale   SubCutaneous every 6 hours  insulin NPH human recombinant 4 Unit(s) SubCutaneous every 6 hours  lidocaine   4% Patch 2 Patch Transdermal every 24 hours  pantoprazole  Injectable 40 milliGRAM(s) IV Push every 12 hours  polyethylene glycol 3350 17 Gram(s) Oral daily  senna 2 Tablet(s) Oral at bedtime  sodium chloride 0.9% for Nebulization 3 milliLiter(s) Nebulizer every 6 hours    MEDICATIONS  (PRN):  acetaminophen   Oral Liquid .. 650 milliGRAM(s) Oral every 6 hours PRN Temp greater or equal to 38C (100.4F), Moderate Pain (4 - 6)  dextrose Oral Gel 15 Gram(s) Oral once PRN Blood Glucose LESS THAN 70 milliGRAM(s)/deciliter  hydrALAZINE Injectable 10 milliGRAM(s) IV Push every 8 hours PRN SBP > 180  sodium chloride 0.9% lock flush 10 milliLiter(s) IV Push every 1 hour PRN Pre/post blood products, medications, blood draw, and to maintain line patency       Vitals:  Vital Signs Last 24 Hrs  T(C): 36.8 (10 Eze 2024 16:00), Max: 36.8 (10 Eze 2024 10:44)  T(F): 98.3 (10 Eze 2024 16:00), Max: 98.3 (10 Eze 2024 16:00)  HR: 53 (10 Eze 2024 16:00) (53 - 58)  BP: 148/55 (10 Eze 2024 16:00) (124/83 - 150/61)  BP(mean): --  RR: 20 (10 Eze 2024 16:00) (18 - 20)  SpO2: 100% (10 Eze 2024 16:00) (98% - 100%)    Parameters below as of 10 Eze 2024 16:00  Patient On (Oxygen Delivery Method): tracheostomy collar  O2 Flow (L/min): 6  O2 Concentration (%): 28      General Exam:   General Appearance: + trach  Head: Normocephalic, atraumatic and no dysmorphic features  Ear, Nose, and Throat: Moist mucous membranes  CVS: S1S2+  Resp: mechanical  Abd: soft NTND  Extremities: No edema, no cyanosis  Skin: No bruises, no rashes     Neurological Exam:  Mental Status: Opens to voice, tracks, follows simple commands. Mouths words  Cranial Nerves: pupils 1-2mm, R facial palsy with smile  Motor: normal tone, RUE and RLE drift.  Sensation:  intact      I personally reviewed the below data/images/labs:    LABS:                          9.7    10.46 )-----------( 334      ( 10 Eze 2024 05:40 )             31.4     06-10    131<L>  |  92<L>  |  71<H>  ----------------------------<  174<H>  5.5<H>   |  21<L>  |  5.16<H>    Ca    8.6      10 Eze 2024 05:40  Phos  2.7     06-10  Mg     2.50     06-10          Urinalysis Basic - ( 10 Eze 2024 05:40 )    Color: x / Appearance: x / SG: x / pH: x  Gluc: 174 mg/dL / Ketone: x  / Bili: x / Urobili: x   Blood: x / Protein: x / Nitrite: x   Leuk Esterase: x / RBC: x / WBC x   Sq Epi: x / Non Sq Epi: x / Bacteria: x                    CT Head No Cont:  (01 May 2024 18:11)  < from: CT Head No Cont (05.01.24 @ 18:11) >    ACC: [id number] EXAM:  CT BRAIN   ORDERED BY: SHELBY MOREL     PROCEDURE DATE:  05/01/2024          INTERPRETATION:  CT OF THE HEAD WITHOUT CONTRAST    CLINICAL INDICATION: Lethargy.    TECHNIQUE: Volumetric CT acquisition was performed through the brain and   reviewed using brain and bone window technique.      COMPARISON: CT head from 1/17/2024    FINDINGS:    The ventricular and sulcal size and configuration is age appropriate.     There is no acute loss of gray-white differentiation. Stable bilateral   inferior frontal encephalomalacia and gliosis likely related to remote   trauma.    There is no evidence of mass effect, midline shift, acute intracranial   hemorrhage, or extra-axial collections.     The calvarium is intact. The paranasalsinuses are clear.The mastoid air   cells are predominantly clear. The orbits are unremarkable.      IMPRESSION:  No acute intracranial hemorrhage or acute territorial infarction. Chronic   findings as above.    --- End of Report ---          GILDARDO KHAN; Resident Radiologist  This document has been electronically signed.  LADARIUS DENNY MD; Attending Radiologist  This document has been electronically signed. May  1 2024  7:54PM    < end of copied text >      EEG Classification / Summary:  Abnormal EEG in the awake, drowsy states.   -Generalized sharp waves with triphasic morphology, initially appearing as frequent GPDs at 1-1.5 Hz, decreasing in prevalence later in recording.  -Variable moderate to mild diffuse slowing.  -No electrographic seizures are captured.     Clinical Impression:  -Generalized sharp waves with triphasic morphology can be seen in toxic-metabolic encephalopathies and indicate some risk of generalized seizures, especially when at higher frequencies than in this study. These decrease in prevalence over the course of recording.  -Variable moderate to mild diffuse cerebral dysfunction is nonspecific in etiology.   -No seizures x2 days of recording.    m< from: MR Head w/wo IV Cont (05.06.24 @ 18:10) >    ACC: [id number] EXAM:  MR BRAIN WAW IC   ORDERED BY: DOLORES QUICK     PROCEDURE DATE:  05/06/2024          INTERPRETATION:  CLINICAL INDICATION: Altered mental status.    TECHNIQUE: Multi-planar, multi-sequence magnetic resonance imaging of the   brain was performed with and without intravenous contrast.    CONTRAST: Post-contrast MR images were obtained following infusion of 5   mL of Gadavist    COMPARISON: No prior studies are available for comparison    FINDINGS:    VENTRICLES AND SULCI:  Normal.  INTRA-AXIAL: Punctate foci of restricted diffusion in the left talya and   left cerebellum with associated T2 prolongation consistent with acute   infarcts. No acute intracranial hemorrhage. Encephalomalacia/gliosis   noted in the inferior frontal lobes. No abnormal enhancement. There are   patchy and confluent foci of hyperintense T2 signal within the   subcortical and periventricular white matter which are nonspecific but   likely related to chronic microvascular ischemic disease.  EXTRA-AXIAL:  No mass or collection.  VISUALIZED SINUSES: Mild polypoid mucosal thickening. Fluid noted in the   nasopharynx.  VISUALIZED MASTOIDS:  Clear.  CALVARIUM:  Normal.  CAROTID FLOW VOIDS: Normal.  MISCELLANEOUS:  None.    IMPRESSION: PUNCTATE FOCI OF RESTRICTED DIFFUSION IN THE LEFT TALYA AND   LEFT CEREBELLUM WITH ASSOCIATED T2 PROLONGATION CONSISTENT WITH ACUTE   INFARCTS. NO ACUTE INTRACRANIAL HEMORRHAGE. NO AREA OF ABNORMAL   ENHANCEMENT.    < end of copied text >    m< from: MR Angio Head No Cont (05.09.24 @ 15:58) >    ACC: [id number] EXAM:  MR ANGIO NECK WAW IC   ORDERED BY: OMAR BUTTON     ACC: [id number] EXAM:  MR ANGIO BRAIN   ORDERED BY: OMAR NESBITT     PROCEDURE DATE:  05/09/2024          INTERPRETATION:  .    CLINICAL INFORMATION: Stroke workup. Talya/cerebellar stroke.    TECHNIQUE: MRA images through the neck and [place] were obtained   using a combination of 2-D and 3-D time-of-flight acquisition. Post   contrast MR angiography of the neck was also performed. The data was then   reformatted intoa volumetric data set and reviewed as rotational MIP   images. 5 cc's of IV Gadavist was administered without immediate   complication. 2.5 cc's was discarded.    COMPARISON: Prior head CT exam dated 5/1/2024.    FINDINGS:    MRA Neck: There is a standard anatomic configuration to the aortic arch.    The origins of the great vessels appear unremarkable. The bilateral   common carotid arteries and carotid bifurcations appear unremarkable.    The bilateral cervical internal carotid arteries are within normal limits.    The origins of the bilateral vertebral arteries are normal. The bilateral   cervical vertebral arteries are normal in course and caliber.    MRA [place]: The bilateral intracranial internal carotid,   anterior, and middle cerebral arteries appear unremarkable.    The anterior and bilateral posterior communicating arteries are notable.    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 Otherwise, the   bilateral intradural vertebral arteries, vertebrobasilar junction,   basilar artery, and basilar tip appear unremarkable as well as the   bilateral posterior cerebral arteries.    IMPRESSION: No large vessel occlusion or stenosis.    < end of copied text >

## 2024-06-10 NOTE — PROGRESS NOTE ADULT - ASSESSMENT
73M PMHx HTN, ESRD, IDDM p/w hiccups and started on baclofen with concern for AMS overnight and desaturation, ?cheye nascimento respirations. Labs with numerous metabolic derangements. CTH with bifrontal encephalomalacia, likley traumatic.   WBC inc significantly, now intubated. Exam limited as on propofol, also on pressors.   s/p LP for meningitis concerns however appears bland - not on antibiotics  EEG with GPDs, no seizures  MR brain with punctuate L pontine and L cerebellar infarcts  MRA H/N with mild basilar fenestration, otherwise neg  mental status improving post extubation but now reintubated for recurrent aspiration   s/p trach, neurologically improving  o/e with R hemiparesis, mental status markedly improved    AMS of unclear etiology - ? baclofen toxicity, no evidence of seizures. 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 - embolic appearing  Intractable hiccups  hypoxic resp failure  ESRD on HD  B cereus bacteremia, likely contaminant  Nonocclusive R common iliac DVT  UGIB    MRI, MRA reviewed, as above - R hemiparesis on exam likely related to known L sided strokes - previously giving poor effort on exam   cont aspirin 81mg  TTE reviewed, no need to repeat for now  Keppra started for GPDs, no improvement in mental status - now better off keppra  continue to monitor off Keppra, avoid baclofen and reglan  s/p trach, peg   HD per nephro  f/u remainder of CSF - negative  s/p Zosyn, Meropenem for pna now on Cipro  Abx per ID  on hep gtt for DVT  on hold for UGIB -> now on Eliquis  GI following   Has kolby for HD

## 2024-06-11 LAB
ANION GAP SERPL CALC-SCNC: 13 MMOL/L — SIGNIFICANT CHANGE UP (ref 7–14)
ANION GAP SERPL CALC-SCNC: 16 MMOL/L — HIGH (ref 7–14)
BUN SERPL-MCNC: 45 MG/DL — HIGH (ref 7–23)
BUN SERPL-MCNC: 48 MG/DL — HIGH (ref 7–23)
CALCIUM SERPL-MCNC: 8.5 MG/DL — SIGNIFICANT CHANGE UP (ref 8.4–10.5)
CALCIUM SERPL-MCNC: 8.5 MG/DL — SIGNIFICANT CHANGE UP (ref 8.4–10.5)
CHLORIDE SERPL-SCNC: 95 MMOL/L — LOW (ref 98–107)
CHLORIDE SERPL-SCNC: 95 MMOL/L — LOW (ref 98–107)
CO2 SERPL-SCNC: 22 MMOL/L — SIGNIFICANT CHANGE UP (ref 22–31)
CO2 SERPL-SCNC: 28 MMOL/L — SIGNIFICANT CHANGE UP (ref 22–31)
CREAT SERPL-MCNC: 3.6 MG/DL — HIGH (ref 0.5–1.3)
CREAT SERPL-MCNC: 3.72 MG/DL — HIGH (ref 0.5–1.3)
EGFR: 17 ML/MIN/1.73M2 — LOW
EGFR: 17 ML/MIN/1.73M2 — LOW
GLUCOSE BLDC GLUCOMTR-MCNC: 157 MG/DL — HIGH (ref 70–99)
GLUCOSE BLDC GLUCOMTR-MCNC: 167 MG/DL — HIGH (ref 70–99)
GLUCOSE BLDC GLUCOMTR-MCNC: 172 MG/DL — HIGH (ref 70–99)
GLUCOSE BLDC GLUCOMTR-MCNC: 187 MG/DL — HIGH (ref 70–99)
GLUCOSE SERPL-MCNC: 158 MG/DL — HIGH (ref 70–99)
GLUCOSE SERPL-MCNC: 166 MG/DL — HIGH (ref 70–99)
MAGNESIUM SERPL-MCNC: 2.4 MG/DL — SIGNIFICANT CHANGE UP (ref 1.6–2.6)
MAGNESIUM SERPL-MCNC: 2.4 MG/DL — SIGNIFICANT CHANGE UP (ref 1.6–2.6)
PHOSPHATE SERPL-MCNC: 2.7 MG/DL — SIGNIFICANT CHANGE UP (ref 2.5–4.5)
PHOSPHATE SERPL-MCNC: 2.9 MG/DL — SIGNIFICANT CHANGE UP (ref 2.5–4.5)
POTASSIUM SERPL-MCNC: 4 MMOL/L — SIGNIFICANT CHANGE UP (ref 3.5–5.3)
POTASSIUM SERPL-MCNC: 5.5 MMOL/L — HIGH (ref 3.5–5.3)
POTASSIUM SERPL-SCNC: 4 MMOL/L — SIGNIFICANT CHANGE UP (ref 3.5–5.3)
POTASSIUM SERPL-SCNC: 5.5 MMOL/L — HIGH (ref 3.5–5.3)
SODIUM SERPL-SCNC: 133 MMOL/L — LOW (ref 135–145)
SODIUM SERPL-SCNC: 136 MMOL/L — SIGNIFICANT CHANGE UP (ref 135–145)

## 2024-06-11 PROCEDURE — 99232 SBSQ HOSP IP/OBS MODERATE 35: CPT

## 2024-06-11 PROCEDURE — 99233 SBSQ HOSP IP/OBS HIGH 50: CPT

## 2024-06-11 RX ORDER — ASPIRIN/CALCIUM CARB/MAGNESIUM 324 MG
81 TABLET ORAL DAILY
Refills: 0 | Status: DISCONTINUED | OUTPATIENT
Start: 2024-06-11 | End: 2024-06-11

## 2024-06-11 RX ORDER — TRAMADOL HYDROCHLORIDE 50 MG/1
25 TABLET ORAL ONCE
Refills: 0 | Status: DISCONTINUED | OUTPATIENT
Start: 2024-06-11 | End: 2024-06-11

## 2024-06-11 RX ORDER — ASPIRIN/CALCIUM CARB/MAGNESIUM 324 MG
81 TABLET ORAL DAILY
Refills: 0 | Status: DISCONTINUED | OUTPATIENT
Start: 2024-06-11 | End: 2024-06-14

## 2024-06-11 RX ADMIN — Medication 300 MILLIGRAM(S): at 12:11

## 2024-06-11 RX ADMIN — HUMAN INSULIN 4 UNIT(S): 100 INJECTION, SUSPENSION SUBCUTANEOUS at 12:09

## 2024-06-11 RX ADMIN — AMLODIPINE BESYLATE 10 MILLIGRAM(S): 2.5 TABLET ORAL at 06:20

## 2024-06-11 RX ADMIN — Medication 200 MILLIGRAM(S): at 12:09

## 2024-06-11 RX ADMIN — LIDOCAINE 2 PATCH: 4 CREAM TOPICAL at 18:07

## 2024-06-11 RX ADMIN — Medication 100 MILLIGRAM(S): at 21:13

## 2024-06-11 RX ADMIN — Medication 100 MILLIGRAM(S): at 12:11

## 2024-06-11 RX ADMIN — HUMAN INSULIN 4 UNIT(S): 100 INJECTION, SUSPENSION SUBCUTANEOUS at 06:39

## 2024-06-11 RX ADMIN — LIDOCAINE 2 PATCH: 4 CREAM TOPICAL at 05:00

## 2024-06-11 RX ADMIN — SODIUM CHLORIDE 3 MILLILITER(S): 9 INJECTION INTRAMUSCULAR; INTRAVENOUS; SUBCUTANEOUS at 17:01

## 2024-06-11 RX ADMIN — HUMAN INSULIN 4 UNIT(S): 100 INJECTION, SUSPENSION SUBCUTANEOUS at 00:53

## 2024-06-11 RX ADMIN — TRAMADOL HYDROCHLORIDE 25 MILLIGRAM(S): 50 TABLET ORAL at 21:16

## 2024-06-11 RX ADMIN — PANTOPRAZOLE SODIUM 40 MILLIGRAM(S): 20 TABLET, DELAYED RELEASE ORAL at 18:02

## 2024-06-11 RX ADMIN — APIXABAN 5 MILLIGRAM(S): 2.5 TABLET, FILM COATED ORAL at 06:20

## 2024-06-11 RX ADMIN — ATORVASTATIN CALCIUM 20 MILLIGRAM(S): 80 TABLET, FILM COATED ORAL at 21:12

## 2024-06-11 RX ADMIN — POLYETHYLENE GLYCOL 3350 17 GRAM(S): 17 POWDER, FOR SOLUTION ORAL at 12:13

## 2024-06-11 RX ADMIN — Medication 81 MILLIGRAM(S): at 21:13

## 2024-06-11 RX ADMIN — Medication 2: at 06:39

## 2024-06-11 RX ADMIN — Medication 2: at 01:07

## 2024-06-11 RX ADMIN — Medication 2: at 12:09

## 2024-06-11 RX ADMIN — SODIUM CHLORIDE 3 MILLILITER(S): 9 INJECTION INTRAMUSCULAR; INTRAVENOUS; SUBCUTANEOUS at 11:35

## 2024-06-11 RX ADMIN — APIXABAN 5 MILLIGRAM(S): 2.5 TABLET, FILM COATED ORAL at 18:03

## 2024-06-11 RX ADMIN — CHLORHEXIDINE GLUCONATE 15 MILLILITER(S): 213 SOLUTION TOPICAL at 06:20

## 2024-06-11 RX ADMIN — Medication 2: at 18:03

## 2024-06-11 RX ADMIN — CARVEDILOL PHOSPHATE 25 MILLIGRAM(S): 80 CAPSULE, EXTENDED RELEASE ORAL at 06:20

## 2024-06-11 RX ADMIN — ALBUTEROL 2.5 MILLIGRAM(S): 90 AEROSOL, METERED ORAL at 03:20

## 2024-06-11 RX ADMIN — ALBUTEROL 2.5 MILLIGRAM(S): 90 AEROSOL, METERED ORAL at 11:35

## 2024-06-11 RX ADMIN — CHLORHEXIDINE GLUCONATE 15 MILLILITER(S): 213 SOLUTION TOPICAL at 18:02

## 2024-06-11 RX ADMIN — Medication 100 MILLIGRAM(S): at 04:06

## 2024-06-11 RX ADMIN — TRAMADOL HYDROCHLORIDE 25 MILLIGRAM(S): 50 TABLET ORAL at 21:31

## 2024-06-11 RX ADMIN — CHLORHEXIDINE GLUCONATE 1 APPLICATION(S): 213 SOLUTION TOPICAL at 06:21

## 2024-06-11 RX ADMIN — ALBUTEROL 2.5 MILLIGRAM(S): 90 AEROSOL, METERED ORAL at 21:05

## 2024-06-11 RX ADMIN — PANTOPRAZOLE SODIUM 40 MILLIGRAM(S): 20 TABLET, DELAYED RELEASE ORAL at 06:20

## 2024-06-11 RX ADMIN — HUMAN INSULIN 4 UNIT(S): 100 INJECTION, SUSPENSION SUBCUTANEOUS at 18:04

## 2024-06-11 RX ADMIN — SENNA PLUS 2 TABLET(S): 8.6 TABLET ORAL at 22:03

## 2024-06-11 RX ADMIN — ALBUTEROL 2.5 MILLIGRAM(S): 90 AEROSOL, METERED ORAL at 17:00

## 2024-06-11 NOTE — PROGRESS NOTE ADULT - SUBJECTIVE AND OBJECTIVE BOX
Cardiovascular Disease Progress Note  DATE OF SERVICE: 24 @ 10:27    Overnight events: No acute events overnight.    The patient is in no distress on trach collar.   Mr. Art denies pain.     Objective Findings:  T(C): 36.9 (24 @ 04:00), Max: 36.9 (24 @ 04:00)  HR: 58 (24 @ 09:04) (53 - 65)  BP: 142/58 (24 @ 04:00) (119/60 - 150/116)  RR: 20 (24 @ 09:04) (18 - 20)  SpO2: 100% (24 @ 09:04) (97% - 100%)  Wt(kg): --  Daily     Daily Weight in k (10 Eze 2024 13:50)      Physical Exam:  Gen: NAD; Patient resting comfortably  HEENT: EOMI, Normocephalic/ atraumatic  CV: RRR, normal S1 + S2, no m/r/g  Lungs:  Normal respiratory effort; clear to auscultation bilaterally  Abd: soft, non-tender; bowel sounds present  Ext: No edema; warm and well perfused    Telemetry: Sinus    Laboratory Data:                        9.7    10.46 )-----------( 334      ( 10 Eze 2024 05:40 )             31.4         136  |  95<L>  |  48<H>  ----------------------------<  158<H>  4.0   |  28  |  3.72<H>    Ca    8.5      2024 08:38  Phos  2.7       Mg     2.40     11                Inpatient Medications:  MEDICATIONS  (STANDING):  albuterol    0.083% 2.5 milliGRAM(s) Nebulizer every 6 hours  amLODIPine   Tablet 10 milliGRAM(s) Oral daily  apixaban 5 milliGRAM(s) Enteral Tube every 12 hours  atorvastatin 20 milliGRAM(s) Oral at bedtime  carvedilol 25 milliGRAM(s) Oral every 12 hours  chlorhexidine 0.12% Liquid 15 milliLiter(s) Oral Mucosa two times a day  chlorhexidine 2% Cloths 1 Application(s) Topical <User Schedule>  ciprofloxacin   IVPB 400 milliGRAM(s) IV Intermittent every 24 hours  ciprofloxacin   IVPB      dextrose 10% Bolus 125 milliLiter(s) IV Bolus once  dextrose 5%. 1000 milliLiter(s) (100 mL/Hr) IV Continuous <Continuous>  dextrose 5%. 1000 milliLiter(s) (50 mL/Hr) IV Continuous <Continuous>  dextrose 50% Injectable 25 Gram(s) IV Push once  dextrose 50% Injectable 12.5 Gram(s) IV Push once  epoetin reilly (EPOGEN) Injectable 65977 Unit(s) IV Push <User Schedule>  ferrous    sulfate Liquid 300 milliGRAM(s) Enteral Tube daily  glucagon  Injectable 1 milliGRAM(s) IntraMuscular once  hydrALAZINE 100 milliGRAM(s) Oral three times a day  insulin lispro (ADMELOG) corrective regimen sliding scale   SubCutaneous every 6 hours  insulin NPH human recombinant 4 Unit(s) SubCutaneous every 6 hours  lidocaine   4% Patch 2 Patch Transdermal every 24 hours  pantoprazole  Injectable 40 milliGRAM(s) IV Push every 12 hours  polyethylene glycol 3350 17 Gram(s) Oral daily  senna 2 Tablet(s) Oral at bedtime  sodium chloride 0.9% for Nebulization 3 milliLiter(s) Nebulizer every 6 hours      Assessment:  73 year old man with HTN, HLD, T2DM on insulin, and ESRD on HD presents with supply demand ischemia and angina.    Plan of Care:    #Type II myocardial infarction-  Secondary to distributive shock earlier on admission.   The repeat echo shows no new wall motion abnormality.   I would not pursue cath at this time given debility and chronic respiratory failure s/p trach .       #HTN-  BP acceptable on current regimen.        #ESRD-  HD as per renal     #DVT   - R common Iliac DVT  AC as per RCU.              Over 55 minutes spent on total encounter; more than 50% of the visit was spent counseling and/or coordinating care by the attending physician.      Geovani Bravo MD [facility]  Cardiovascular Disease  [phone]

## 2024-06-11 NOTE — PROGRESS NOTE ADULT - SUBJECTIVE AND OBJECTIVE BOX
Patient is a 73y old  Male who presents with a chief complaint of Unstable angina, abn EKG (11 Jun 2024 13:15)      HPI:  73-year-old male past medical history hypertension, ESRD on dialysis Monday Wednesday Friday, diabetes insulin-dependent presents with hiccups patient endorses persistent hiccups for the last 2 days. found to have peaked T waves, a new finding. denies dizziness, N/V, denies CP, palps, abd pain (29 Apr 2024 21:15)    out of bed to chair    REVIEW OF SYSTEMS  weakness      PAST MEDICAL & SURGICAL HISTORY  Diabetes    Stage 5 chronic kidney disease not on chronic dialysis    Benign essential HTN    HLD (hyperlipidemia)    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    ESRD on hemodialysis    No significant past surgical history    AVF (arteriovenous fistula)    Arteriovenous fistula         CURRENT FUNCTIONAL STATUS  6/9   Bed Mobility  Bed Mobility Training Rehab Potential: good, to achieve stated therapy goals  Bed Mobility Training Symptoms Noted During/After Treatment: none  Bed Mobility Training Sit-to-Supine: moderate assist (50% patient effort);  1 person assist;  nonverbal cues (demo/gestures);  verbal cues  Bed Mobility Training Supine-to-Sit: moderate assist (50% patient effort);  1 person assist;  nonverbal cues (demo/gestures);  verbal cues  Bed Mobility Training Limitations: impaired ability to control trunk for mobility;  decreased strength;  impaired balance;  impaired postural control    Sit-Stand Transfer Training  Sit-to-Stand Transfer Training Treatment not Performed: pt deferred    Therapeutic Exercise  Therapeutic Exercise Rehab Effort: good  Therapeutic Exercise Symptoms Noted During/After Treatment: none  Therapeutic Exercise Detail: Pt performed seated, active, bilateral knee extensions, 1x10       FAMILY HISTORY   FHx: diabetes mellitus (Father, Aunt)      RECENT LABS/IMAGING  CBC Full  -  ( 10 Eze 2024 05:40 )  WBC Count : 10.46 K/uL  RBC Count : 4.27 M/uL  Hemoglobin : 9.7 g/dL  Hematocrit : 31.4 %  Platelet Count - Automated : 334 K/uL  Mean Cell Volume : 73.5 fL  Mean Cell Hemoglobin : 22.7 pg  Mean Cell Hemoglobin Concentration : 30.9 gm/dL  Auto Neutrophil # : x  Auto Lymphocyte # : x  Auto Monocyte # : x  Auto Eosinophil # : x  Auto Basophil # : x  Auto Neutrophil % : x  Auto Lymphocyte % : x  Auto Monocyte % : x  Auto Eosinophil % : x  Auto Basophil % : x    06-11    136  |  95<L>  |  48<H>  ----------------------------<  158<H>  4.0   |  28  |  3.72<H>    Ca    8.5      11 Jun 2024 08:38  Phos  2.7     06-11  Mg     2.40     06-11      Urinalysis Basic - ( 11 Jun 2024 08:38 )    Color: x / Appearance: x / SG: x / pH: x  Gluc: 158 mg/dL / Ketone: x  / Bili: x / Urobili: x   Blood: x / Protein: x / Nitrite: x   Leuk Esterase: x / RBC: x / WBC x   Sq Epi: x / Non Sq Epi: x / Bacteria: x        VITALS  T(C): 36.1 (06-11-24 @ 12:11), Max: 36.9 (06-11-24 @ 04:00)  HR: 56 (06-11-24 @ 12:11) (53 - 65)  BP: 139/52 (06-11-24 @ 12:11) (119/60 - 150/116)  RR: 18 (06-11-24 @ 12:11) (18 - 20)  SpO2: 98% (06-11-24 @ 12:11) (97% - 100%)  Wt(kg): --    ALLERGIES  No Known Allergies      MEDICATIONS   acetaminophen   Oral Liquid .. 650 milliGRAM(s) Oral every 6 hours PRN  albuterol    0.083% 2.5 milliGRAM(s) Nebulizer every 6 hours  amLODIPine   Tablet 10 milliGRAM(s) Oral daily  apixaban 5 milliGRAM(s) Enteral Tube every 12 hours  atorvastatin 20 milliGRAM(s) Oral at bedtime  carvedilol 25 milliGRAM(s) Oral every 12 hours  chlorhexidine 0.12% Liquid 15 milliLiter(s) Oral Mucosa two times a day  chlorhexidine 2% Cloths 1 Application(s) Topical <User Schedule>  ciprofloxacin   IVPB 400 milliGRAM(s) IV Intermittent every 24 hours  ciprofloxacin   IVPB      dextrose 10% Bolus 125 milliLiter(s) IV Bolus once  dextrose 5%. 1000 milliLiter(s) IV Continuous <Continuous>  dextrose 5%. 1000 milliLiter(s) IV Continuous <Continuous>  dextrose 50% Injectable 25 Gram(s) IV Push once  dextrose 50% Injectable 12.5 Gram(s) IV Push once  dextrose Oral Gel 15 Gram(s) Oral once PRN  epoetin reilly (EPOGEN) Injectable 34166 Unit(s) IV Push <User Schedule>  ferrous    sulfate Liquid 300 milliGRAM(s) Enteral Tube daily  glucagon  Injectable 1 milliGRAM(s) IntraMuscular once  hydrALAZINE 100 milliGRAM(s) Oral three times a day  hydrALAZINE Injectable 10 milliGRAM(s) IV Push every 8 hours PRN  insulin lispro (ADMELOG) corrective regimen sliding scale   SubCutaneous every 6 hours  insulin NPH human recombinant 4 Unit(s) SubCutaneous every 6 hours  lidocaine   4% Patch 2 Patch Transdermal every 24 hours  pantoprazole  Injectable 40 milliGRAM(s) IV Push every 12 hours  polyethylene glycol 3350 17 Gram(s) Oral daily  senna 2 Tablet(s) Oral at bedtime  sodium chloride 0.9% for Nebulization 3 milliLiter(s) Nebulizer every 6 hours  sodium chloride 0.9% lock flush 10 milliLiter(s) IV Push every 1 hour PRN      ----------------------------------------------------------------------------------------   PHYSICAL EXAM    Constitutional - NAD   HEENT - + tc  Chest -no respiratory distress  Cardiovascular - RRR, S1S2   Abdomen -  +PEG, Soft, NTND  Extremities - No C/C/E, No calf tenderness   Neurologic Exam -                    Cognitive - Awake, Alert      Communication - Fluent, No dysarthria     Cranial Nerves - CN 2-12 intact     Motor -                      LEFT    UE - ShAB 4/5, EF 4/5, EE 4/5, WE 4/5,  4/5                    RIGHT UE - ShAB 4/5, EF 4/5, EE 4/5, WE 4/5,  4/5                    LEFT    LE - HF 3/5, KE 3/5, DF 3/5, PF 3/5                    RIGHT LE - HF 3/5, KE 3/5, DF 3/5, PF 3/5        Sensory - Intact to LT           Balance - WNL Static  Psychiatric - Mood stable, Affect WNL  ----------------------------------------------------------------------------------------  ASSESSMENT/PLAN  73 year old male, history of ESRD on HD MWF, HTN, and IDDM2 A1C 6.9 who presented chest pain complicated by hiccups x 2 days and admitted for NSTEMI, course complicated by baclofen toxicity, GI bleed, dvt  continue bedside PT and OT  SLP    DVT PPX - eliquis  out of bed to chair as tolerates  Rehab - recommend acute inpatient rehab when medically cleared. Patient can tolerate 3 hours per day of therapy with medical supervision

## 2024-06-11 NOTE — SWALLOW BEDSIDE ASSESSMENT ADULT - ADDITIONAL RECOMMENDATIONS
Medical team advised to reconsult as patient continues to becomes medically optimized. This service to follow up as schedule permits.
Medical team advised to reconsult as patient becomes medically optimized (e.g. downsize in trach, speaking valve placement, capping trials).
1.) Medical team advised to reconsult service as medical status is optimized.  2.) This service will follow up for reassessment as schedule permits.

## 2024-06-11 NOTE — PROGRESS NOTE ADULT - SUBJECTIVE AND OBJECTIVE BOX
Patient is a 73y Male     Patient is a 73y old  Male who presents with a chief complaint of Unstable angina, abn EKG (10 Eze 2024 23:18)      HPI:  73-year-old male past medical history hypertension, ESRD on dialysis Monday Wednesday Friday, diabetes insulin-dependent presents with hiccups patient endorses persistent hiccups for the last 2 days. found to have peaked T waves, a new finding. denies dizziness, N/V, denies CP, palps, abd pain (29 Apr 2024 21:15)      PAST MEDICAL & SURGICAL HISTORY:  Diabetes      Benign essential HTN      HLD (hyperlipidemia)      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      ESRD on hemodialysis      Arteriovenous fistula          MEDICATIONS  (STANDING):  albuterol    0.083% 2.5 milliGRAM(s) Nebulizer every 6 hours  amLODIPine   Tablet 10 milliGRAM(s) Oral daily  apixaban 5 milliGRAM(s) Enteral Tube every 12 hours  atorvastatin 20 milliGRAM(s) Oral at bedtime  carvedilol 25 milliGRAM(s) Oral every 12 hours  chlorhexidine 0.12% Liquid 15 milliLiter(s) Oral Mucosa two times a day  chlorhexidine 2% Cloths 1 Application(s) Topical <User Schedule>  ciprofloxacin   IVPB 400 milliGRAM(s) IV Intermittent every 24 hours  ciprofloxacin   IVPB      dextrose 10% Bolus 125 milliLiter(s) IV Bolus once  dextrose 5%. 1000 milliLiter(s) (100 mL/Hr) IV Continuous <Continuous>  dextrose 5%. 1000 milliLiter(s) (50 mL/Hr) IV Continuous <Continuous>  dextrose 50% Injectable 25 Gram(s) IV Push once  dextrose 50% Injectable 12.5 Gram(s) IV Push once  epoetin reilly (EPOGEN) Injectable 31391 Unit(s) IV Push <User Schedule>  ferrous    sulfate Liquid 300 milliGRAM(s) Enteral Tube daily  glucagon  Injectable 1 milliGRAM(s) IntraMuscular once  hydrALAZINE 100 milliGRAM(s) Oral three times a day  insulin lispro (ADMELOG) corrective regimen sliding scale   SubCutaneous every 6 hours  insulin NPH human recombinant 4 Unit(s) SubCutaneous every 6 hours  lidocaine   4% Patch 2 Patch Transdermal every 24 hours  pantoprazole  Injectable 40 milliGRAM(s) IV Push every 12 hours  polyethylene glycol 3350 17 Gram(s) Oral daily  senna 2 Tablet(s) Oral at bedtime  sodium chloride 0.9% for Nebulization 3 milliLiter(s) Nebulizer every 6 hours      Allergies    No Known Allergies    Intolerances        SOCIAL HISTORY:  Denies ETOh,Smoking,     FAMILY HISTORY:  FHx: diabetes mellitus (Father, Aunt)        REVIEW OF SYSTEMS:    CONSTITUTIONAL: No weakness, fevers or chills  EYES/ENT: No visual changes;  No vertigo or throat pain   NECK: No pain or stiffness  RESPIRATORY: No cough, wheezing, hemoptysis; No shortness of breath  CARDIOVASCULAR: No chest pain or palpitations  GASTROINTESTINAL: No abdominal or epigastric pain. No nausea, vomiting, or hematemesis; No diarrhea or constipation. No melena or hematochezia.  GENITOURINARY: No dysuria, frequency or hematuria  NEUROLOGICAL: No numbness or weakness  SKIN: No itching, burning, rashes, or lesions   All other review of systems is negative unless indicated above.    VITAL:  T(C): , Max: 36.9 (06-11-24 @ 04:00)  T(F): , Max: 98.4 (06-11-24 @ 04:00)  HR: 56 (06-11-24 @ 04:00)  BP: 142/58 (06-11-24 @ 04:00)  BP(mean): 81 (06-11-24 @ 04:00)  RR: 18 (06-11-24 @ 04:00)  SpO2: 100% (06-11-24 @ 04:00)  Wt(kg): --    I and O's:    06-09 @ 07:01  -  06-10 @ 07:00  --------------------------------------------------------  IN: 1180 mL / OUT: 1 mL / NET: 1179 mL    06-10 @ 07:01  -  06-11 @ 07:00  --------------------------------------------------------  IN: 400 mL / OUT: 2400 mL / NET: -2000 mL          PHYSICAL EXAM:    Constitutional: NAD  HEENT: PERRLA,   Neck: No JVD  Respiratory: CTA B/L  Cardiovascular: S1 and S2  Gastrointestinal: BS+, soft, NT/ND  Extremities: No peripheral edema  Neurological: A/O x 3, no focal deficits  Psychiatric: Normal mood, normal affect  : No Abbasi  Skin: No rashes  Access: Not applicable  Back: No CVA tenderness    LABS:                        9.7    10.46 )-----------( 334      ( 10 Eze 2024 05:40 )             31.4     06-10    131<L>  |  92<L>  |  71<H>  ----------------------------<  174<H>  5.5<H>   |  21<L>  |  5.16<H>    Ca    8.6      10 Eze 2024 05:40  Phos  2.7     06-10  Mg     2.50     06-10            RADIOLOGY & ADDITIONAL STUDIES:

## 2024-06-11 NOTE — PROGRESS NOTE ADULT - NS ATTEND AMEND GEN_ALL_CORE FT
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 Now transferred to RCU 5/16 for further management.     Pt initially with acute encephalopathy concerning for baclofen toxicity followed by MRI Head (5/6) found to have left talya and left cerebellum ischemic infarct with no vessel occlusion/stenosis on MRA. Pt now clinically improved, encephalopathy resolving. Is able to communicate spontaneously but remains physically weakened from CVA and prolonged hospitalization with critical illness. PM&R consulted, dispo possibly to acute rehab pending repeat PT evaluation today. Continue on NOAC and statin. Neurology recs appreciated. PT/OT following.     Pt with acute combined hypoxemic and hypercapnic respiratory failure s/p ETT intubation/MV with failure to wean from ventilator requiring tracheostomy placement (IP 5/14). Pt now weaned to TC ATC (5/30) with PMV with dependent use. ABG on current settings wnl. Trach care and suctioning as per RCU team. Oral hygiene and aspiration precautions in place. Wean O2 supplementation for goal O2 saturation 90-95%. Airway clearance therapy in place.     Pt initially admitted with demand ischemia versus NSTEMI. EKG concerning for ST deviation/t-wave peak with (+) troponin leak. TTE (4/30) without regional wall abnormalities. No indication for catheterization at this time. Cardiology following and recs appreciated. Pt remains HD stable and clinically euvolemic. No events on telemetry. Cardiology following, recs appreciated.     Pt with oropharyngeal dysphagia s/p PEG placement (Surgery, 5/17). Pt tolerating feeds at goal rate. SLP evaluated today, pt did not pass green dye test. Pt with concern for melena on 5/26 with blood loss anemia, now resolved. GI consulted, but not a candidate for endoscopy given high surgical risk. Will CTM carefully. PPI BID continued. CBC q24hr.    Pt with ESRD via right AVF found to have stenosis requiring temporary access. Right Shiley removed (5/10) 2/2 bacteremia, replaced 5/13 and then a gain 5/27 2/2 recurrent fevers. Fevers resolved, pt with new Shiley 6/1. Pt s/p RUE fistuloplasty (6/4) now tolerating HD via AVF. HD as per nephrology team, scheduled for tomorrow.     Hospital course further c/b aspiration PNA c/b B. cereus bacteremia (5/10) with clearance of cx 5/12, s/p completion of Abx with Zosyn through 5/27 and Vancomycin through 5/21. Most recently pt with recurrent thick secretions now completing course of ciprofloxacin (6/7-6/11.)     Hospital course further c/b RLE DVT (5/12/24) now on NOAC since 6/6. Tolerating well. Will c/w current therapy x3-6 months.    Dispo pending medical optimization. Pt full code. DVT ppx full A/C with NOAC. Discussed with pt and wife at bedside today, will continue to update throughout hospitalization.

## 2024-06-11 NOTE — SWALLOW BEDSIDE ASSESSMENT ADULT - SWALLOW EVAL: RECOMMENDED DIET
NPO w/alternate means of nutrition/hydration via PEG
NPO w/alternate means of nutrition/hydration via PEG
NPO w/consideration of short term non oral nutrition/hydration

## 2024-06-11 NOTE — SWALLOW BEDSIDE ASSESSMENT ADULT - SWALLOW EVAL: DIAGNOSIS
Patient was provided applesauce with green dye. Functional oral phase for puree characterized by adequate stripping of bolus from spoon, anterior posterior transfer, and oral clearance.  Severe pharyngeal dysphagia marked by suspected delay swallow initiation, hyolaryngeal excursion upon palpation, immediate cough response and green dyed applesauce expelled from trach indicative of gross aspiration.  RN present for immediate trach suction. Suction preformed again thirty minutes post-PO trials with green dye noted in suction catheter indicative of aspiration.
Moderate oral dysphagia for puree and moderately thick liquids via teaspoon marked by adequate stripping of bolus, increased anterior posterior transport, intermittent anterior loss. Moderate pharyngeal dysphagia for puree and moderately thick liquids via spoon marked by suspected delay in swallow initiation, hyolaryngeal excursion upon palpation, delayed weak cough as trials progressed. Patient suctioned by RN.
Patient was provided puree & moderately thick liquids via spoon with green dye. Functional oral phase for puree/moderately thick characterized by adequate retrieval, anterior posterior transfer, and oral clearance.  Severe pharyngeal dysphagia marked by suspected delay swallow initiation, hyolaryngeal excursion upon palpation, delayed cough response and green dyed applesauce expelled oral cavity.  RT present for immediate trach suction with no green dye noted in return. Trach suction preformed by RN 30 min post-PO trials with +green dye noted in suction catheter indicative of aspiration.

## 2024-06-11 NOTE — PROGRESS NOTE ADULT - SUBJECTIVE AND OBJECTIVE BOX
Neurology Progress Note    S: Patient seen and examined.     Medications: MEDICATIONS  (STANDING):  albuterol    0.083% 2.5 milliGRAM(s) Nebulizer every 6 hours  amLODIPine   Tablet 10 milliGRAM(s) Oral daily  apixaban 5 milliGRAM(s) Enteral Tube every 12 hours  aspirin  chewable 81 milliGRAM(s) Oral daily  atorvastatin 20 milliGRAM(s) Oral at bedtime  carvedilol 25 milliGRAM(s) Oral every 12 hours  chlorhexidine 0.12% Liquid 15 milliLiter(s) Oral Mucosa two times a day  chlorhexidine 2% Cloths 1 Application(s) Topical <User Schedule>  ciprofloxacin   IVPB 400 milliGRAM(s) IV Intermittent every 24 hours  ciprofloxacin   IVPB      dextrose 10% Bolus 125 milliLiter(s) IV Bolus once  dextrose 5%. 1000 milliLiter(s) (100 mL/Hr) IV Continuous <Continuous>  dextrose 5%. 1000 milliLiter(s) (50 mL/Hr) IV Continuous <Continuous>  dextrose 50% Injectable 25 Gram(s) IV Push once  dextrose 50% Injectable 12.5 Gram(s) IV Push once  epoetin reilly (EPOGEN) Injectable 03165 Unit(s) IV Push <User Schedule>  ferrous    sulfate Liquid 300 milliGRAM(s) Enteral Tube daily  glucagon  Injectable 1 milliGRAM(s) IntraMuscular once  hydrALAZINE 100 milliGRAM(s) Oral three times a day  insulin lispro (ADMELOG) corrective regimen sliding scale   SubCutaneous every 6 hours  insulin NPH human recombinant 4 Unit(s) SubCutaneous every 6 hours  lidocaine   4% Patch 2 Patch Transdermal every 24 hours  pantoprazole  Injectable 40 milliGRAM(s) IV Push every 12 hours  polyethylene glycol 3350 17 Gram(s) Oral daily  senna 2 Tablet(s) Oral at bedtime  sodium chloride 0.9% for Nebulization 3 milliLiter(s) Nebulizer every 6 hours    MEDICATIONS  (PRN):  acetaminophen   Oral Liquid .. 650 milliGRAM(s) Oral every 6 hours PRN Temp greater or equal to 38C (100.4F), Moderate Pain (4 - 6)  dextrose Oral Gel 15 Gram(s) Oral once PRN Blood Glucose LESS THAN 70 milliGRAM(s)/deciliter  hydrALAZINE Injectable 10 milliGRAM(s) IV Push every 8 hours PRN SBP > 180  sodium chloride 0.9% lock flush 10 milliLiter(s) IV Push every 1 hour PRN Pre/post blood products, medications, blood draw, and to maintain line patency       Vitals:  Vital Signs Last 24 Hrs  T(C): 36.6 (11 Jun 2024 18:00), Max: 36.9 (11 Jun 2024 04:00)  T(F): 97.8 (11 Jun 2024 18:00), Max: 98.4 (11 Jun 2024 04:00)  HR: 55 (11 Jun 2024 20:00) (55 - 72)  BP: 120/76 (11 Jun 2024 20:00) (120/76 - 150/116)  BP(mean): 85 (11 Jun 2024 20:00) (81 - 129)  RR: 20 (11 Jun 2024 20:00) (18 - 20)  SpO2: 100% (11 Jun 2024 20:00) (96% - 100%)    Parameters below as of 11 Jun 2024 20:00  Patient On (Oxygen Delivery Method): tracheostomy collar          General Exam:   General Appearance: + trach  Head: Normocephalic, atraumatic and no dysmorphic features  Ear, Nose, and Throat: Moist mucous membranes  CVS: S1S2+  Resp: mechanical  Abd: soft NTND  Extremities: No edema, no cyanosis  Skin: No bruises, no rashes     Neurological Exam:  Mental Status: Opens to voice, tracks, follows simple commands. Mouths words  Cranial Nerves: pupils 1-2mm, R facial palsy with smile  Motor: normal tone, RUE and RLE drift.  Sensation:  intact      I personally reviewed the below data/images/labs:    LABS:                          9.7    10.46 )-----------( 334      ( 10 Eze 2024 05:40 )             31.4     06-11    136  |  95<L>  |  48<H>  ----------------------------<  158<H>  4.0   |  28  |  3.72<H>    Ca    8.5      11 Jun 2024 08:38  Phos  2.7     06-11  Mg     2.40     06-11          Urinalysis Basic - ( 11 Jun 2024 08:38 )    Color: x / Appearance: x / SG: x / pH: x  Gluc: 158 mg/dL / Ketone: x  / Bili: x / Urobili: x   Blood: x / Protein: x / Nitrite: x   Leuk Esterase: x / RBC: x / WBC x   Sq Epi: x / Non Sq Epi: x / Bacteria: x        CT Head No Cont:  (01 May 2024 18:11)  < from: CT Head No Cont (05.01.24 @ 18:11) >    ACC: [id number] EXAM:  CT BRAIN   ORDERED BY: SHELBY MOREL     PROCEDURE DATE:  05/01/2024          INTERPRETATION:  CT OF THE HEAD WITHOUT CONTRAST    CLINICAL INDICATION: Lethargy.    TECHNIQUE: Volumetric CT acquisition was performed through the brain and   reviewed using brain and bone window technique.      COMPARISON: CT head from 1/17/2024    FINDINGS:    The ventricular and sulcal size and configuration is age appropriate.     There is no acute loss of gray-white differentiation. Stable bilateral   inferior frontal encephalomalacia and gliosis likely related to remote   trauma.    There is no evidence of mass effect, midline shift, acute intracranial   hemorrhage, or extra-axial collections.     The calvarium is intact. The paranasalsinuses are clear.The mastoid air   cells are predominantly clear. The orbits are unremarkable.      IMPRESSION:  No acute intracranial hemorrhage or acute territorial infarction. Chronic   findings as above.    --- End of Report ---          GILDARDO KHAN; Resident Radiologist  This document has been electronically signed.  LADARIUS DENNY MD; Attending Radiologist  This document has been electronically signed. May  1 2024  7:54PM    < end of copied text >      EEG Classification / Summary:  Abnormal EEG in the awake, drowsy states.   -Generalized sharp waves with triphasic morphology, initially appearing as frequent GPDs at 1-1.5 Hz, decreasing in prevalence later in recording.  -Variable moderate to mild diffuse slowing.  -No electrographic seizures are captured.     Clinical Impression:  -Generalized sharp waves with triphasic morphology can be seen in toxic-metabolic encephalopathies and indicate some risk of generalized seizures, especially when at higher frequencies than in this study. These decrease in prevalence over the course of recording.  -Variable moderate to mild diffuse cerebral dysfunction is nonspecific in etiology.   -No seizures x2 days of recording.    m< from: MR Head w/wo IV Cont (05.06.24 @ 18:10) >    ACC: [id number] EXAM:  MR BRAIN WAW IC   ORDERED BY: DOLORES QUICK     PROCEDURE DATE:  05/06/2024          INTERPRETATION:  CLINICAL INDICATION: Altered mental status.    TECHNIQUE: Multi-planar, multi-sequence magnetic resonance imaging of the   brain was performed with and without intravenous contrast.    CONTRAST: Post-contrast MR images were obtained following infusion of 5   mL of Gadavist    COMPARISON: No prior studies are available for comparison    FINDINGS:    VENTRICLES AND SULCI:  Normal.  INTRA-AXIAL: Punctate foci of restricted diffusion in the left talya and   left cerebellum with associated T2 prolongation consistent with acute   infarcts. No acute intracranial hemorrhage. Encephalomalacia/gliosis   noted in the inferior frontal lobes. No abnormal enhancement. There are   patchy and confluent foci of hyperintense T2 signal within the   subcortical and periventricular white matter which are nonspecific but   likely related to chronic microvascular ischemic disease.  EXTRA-AXIAL:  No mass or collection.  VISUALIZED SINUSES: Mild polypoid mucosal thickening. Fluid noted in the   nasopharynx.  VISUALIZED MASTOIDS:  Clear.  CALVARIUM:  Normal.  CAROTID FLOW VOIDS: Normal.  MISCELLANEOUS:  None.    IMPRESSION: PUNCTATE FOCI OF RESTRICTED DIFFUSION IN THE LEFT TLAYA AND   LEFT CEREBELLUM WITH ASSOCIATED T2 PROLONGATION CONSISTENT WITH ACUTE   INFARCTS. NO ACUTE INTRACRANIAL HEMORRHAGE. NO AREA OF ABNORMAL   ENHANCEMENT.    < end of copied text >    m< from: MR Angio Head No Cont (05.09.24 @ 15:58) >    ACC: [id number] EXAM:  MR ANGIO NECK WAW IC   ORDERED BY: OMAR BUTTON     ACC: [id number] EXAM:  MR ANGIO BRAIN   ORDERED BY: OMAR NESBITT     PROCEDURE DATE:  05/09/2024          INTERPRETATION:  .    CLINICAL INFORMATION: Stroke workup. Talya/cerebellar stroke.    TECHNIQUE: MRA images through the neck and [place] were obtained   using a combination of 2-D and 3-D time-of-flight acquisition. Post   contrast MR angiography of the neck was also performed. The data was then   reformatted intoa volumetric data set and reviewed as rotational MIP   images. 5 cc's of IV Gadavist was administered without immediate   complication. 2.5 cc's was discarded.    COMPARISON: Prior head CT exam dated 5/1/2024.    FINDINGS:    MRA Neck: There is a standard anatomic configuration to the aortic arch.    The origins of the great vessels appear unremarkable. The bilateral   common carotid arteries and carotid bifurcations appear unremarkable.    The bilateral cervical internal carotid arteries are within normal limits.    The origins of the bilateral vertebral arteries are normal. The bilateral   cervical vertebral arteries are normal in course and caliber.    MRA [place]: The bilateral intracranial internal carotid,   anterior, and middle cerebral arteries appear unremarkable.    The anterior and bilateral posterior communicating arteries are notable.    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 Otherwise, the   bilateral intradural vertebral arteries, vertebrobasilar junction,   basilar artery, and basilar tip appear unremarkable as well as the   bilateral posterior cerebral arteries.    IMPRESSION: No large vessel occlusion or stenosis.    < end of copied text >

## 2024-06-11 NOTE — SWALLOW BEDSIDE ASSESSMENT ADULT - ASR SWALLOW RECOMMEND DIAG
Objective testing not warranted at this time given gross aspiration evident via Green D[place] Test as noted above.
Objective testing not warranted at this time due to patient with overt signs of aspiration on conservative consistencies and unable to maintain adequate level of arousal throughout evaluation
Objective testing not warranted at this time given +aspiration evident via Green Dye Test as noted above.

## 2024-06-11 NOTE — PROGRESS NOTE ADULT - SUBJECTIVE AND OBJECTIVE BOX
CHIEF COMPLAINT:Patient is a 73y old  Male who presents with a chief complaint of Unstable angina, abn EKG (11 Jun 2024 07:22)      INTERVAL EVENTS:     ROS: Seen by bedside during AM rounds     OBJECTIVE:  ICU Vital Signs Last 24 Hrs  T(C): 36.9 (11 Jun 2024 04:00), Max: 36.9 (11 Jun 2024 04:00)  T(F): 98.4 (11 Jun 2024 04:00), Max: 98.4 (11 Jun 2024 04:00)  HR: 56 (11 Jun 2024 04:00) (53 - 65)  BP: 142/58 (11 Jun 2024 04:00) (119/60 - 150/116)  BP(mean): 81 (11 Jun 2024 04:00) (77 - 129)  ABP: --  ABP(mean): --  RR: 18 (11 Jun 2024 04:00) (18 - 20)  SpO2: 100% (11 Jun 2024 04:00) (97% - 100%)    O2 Parameters below as of 11 Jun 2024 04:00  Patient On (Oxygen Delivery Method): tracheostomy collar  O2 Flow (L/min): 6            06-10 @ 07:01  -  06-11 @ 07:00  --------------------------------------------------------  IN: 1145 mL / OUT: 2400 mL / NET: -1255 mL      CAPILLARY BLOOD GLUCOSE      POCT Blood Glucose.: 187 mg/dL (11 Jun 2024 06:21)      PHYSICAL EXAM:  General:   HEENT:   Lymph Nodes:  Neck:   Respiratory:   Cardiovascular:   Abdomen:   Extremities:   Skin:   Neurological:  Psychiatry:        HOSPITAL MEDICATIONS:  MEDICATIONS  (STANDING):  albuterol    0.083% 2.5 milliGRAM(s) Nebulizer every 6 hours  amLODIPine   Tablet 10 milliGRAM(s) Oral daily  apixaban 5 milliGRAM(s) Enteral Tube every 12 hours  atorvastatin 20 milliGRAM(s) Oral at bedtime  carvedilol 25 milliGRAM(s) Oral every 12 hours  chlorhexidine 0.12% Liquid 15 milliLiter(s) Oral Mucosa two times a day  chlorhexidine 2% Cloths 1 Application(s) Topical <User Schedule>  ciprofloxacin   IVPB 400 milliGRAM(s) IV Intermittent every 24 hours  ciprofloxacin   IVPB      dextrose 10% Bolus 125 milliLiter(s) IV Bolus once  dextrose 5%. 1000 milliLiter(s) (100 mL/Hr) IV Continuous <Continuous>  dextrose 5%. 1000 milliLiter(s) (50 mL/Hr) IV Continuous <Continuous>  dextrose 50% Injectable 25 Gram(s) IV Push once  dextrose 50% Injectable 12.5 Gram(s) IV Push once  epoetin reilly (EPOGEN) Injectable 73253 Unit(s) IV Push <User Schedule>  ferrous    sulfate Liquid 300 milliGRAM(s) Enteral Tube daily  glucagon  Injectable 1 milliGRAM(s) IntraMuscular once  hydrALAZINE 100 milliGRAM(s) Oral three times a day  insulin lispro (ADMELOG) corrective regimen sliding scale   SubCutaneous every 6 hours  insulin NPH human recombinant 4 Unit(s) SubCutaneous every 6 hours  lidocaine   4% Patch 2 Patch Transdermal every 24 hours  pantoprazole  Injectable 40 milliGRAM(s) IV Push every 12 hours  polyethylene glycol 3350 17 Gram(s) Oral daily  senna 2 Tablet(s) Oral at bedtime  sodium chloride 0.9% for Nebulization 3 milliLiter(s) Nebulizer every 6 hours    MEDICATIONS  (PRN):  acetaminophen   Oral Liquid .. 650 milliGRAM(s) Oral every 6 hours PRN Temp greater or equal to 38C (100.4F), Moderate Pain (4 - 6)  dextrose Oral Gel 15 Gram(s) Oral once PRN Blood Glucose LESS THAN 70 milliGRAM(s)/deciliter  hydrALAZINE Injectable 10 milliGRAM(s) IV Push every 8 hours PRN SBP > 180  sodium chloride 0.9% lock flush 10 milliLiter(s) IV Push every 1 hour PRN Pre/post blood products, medications, blood draw, and to maintain line patency      LABS:                        9.7    10.46 )-----------( 334      ( 10 Eze 2024 05:40 )             31.4     06-11    133<L>  |  95<L>  |  45<H>  ----------------------------<  166<H>  5.5<H>   |  22  |  3.60<H>    Ca    8.5      11 Jun 2024 06:51  Phos  2.9     06-11  Mg     2.40     06-11        Urinalysis Basic - ( 11 Jun 2024 06:51 )    Color: x / Appearance: x / SG: x / pH: x  Gluc: 166 mg/dL / Ketone: x  / Bili: x / Urobili: x   Blood: x / Protein: x / Nitrite: x   Leuk Esterase: x / RBC: x / WBC x   Sq Epi: x / Non Sq Epi: x / Bacteria: x         CHIEF COMPLAINT:Patient is a 73y old  Male who presents with a chief complaint of Unstable angina, abn EKG (11 Jun 2024 07:22)      INTERVAL EVENTS:     ROS: Seen by bedside during AM rounds     OBJECTIVE:  ICU Vital Signs Last 24 Hrs  T(C): 36.9 (11 Jun 2024 04:00), Max: 36.9 (11 Jun 2024 04:00)  T(F): 98.4 (11 Jun 2024 04:00), Max: 98.4 (11 Jun 2024 04:00)  HR: 56 (11 Jun 2024 04:00) (53 - 65)  BP: 142/58 (11 Jun 2024 04:00) (119/60 - 150/116)  BP(mean): 81 (11 Jun 2024 04:00) (77 - 129)  ABP: --  ABP(mean): --  RR: 18 (11 Jun 2024 04:00) (18 - 20)  SpO2: 100% (11 Jun 2024 04:00) (97% - 100%)    O2 Parameters below as of 11 Jun 2024 04:00  Patient On (Oxygen Delivery Method): tracheostomy collar  O2 Flow (L/min): 6            06-10 @ 07:01  -  06-11 @ 07:00  --------------------------------------------------------  IN: 1145 mL / OUT: 2400 mL / NET: -1255 mL      CAPILLARY BLOOD GLUCOSE      POCT Blood Glucose.: 187 mg/dL (11 Jun 2024 06:21)      PHYSICAL EXAM:  General: NAD. Sitting in bedside recliner.    Neck: (+) Trach tube noted, site c/d/i.  Cards: S1/S2, no murmurs   Pulm: CTA bilaterally. No wheezes.   Abdomen: Soft, NTND. (+) PEG noted, site c/d/i.   Extremities: RUE AVF with palpable thrill. Ext warm to touch.  No pedal edema. Intact distal pulses.  Neurology: Awake/alert. Follows commands. Nods head to questioning. 5/5 motor strength x4E.   Skin: warm to touch, color appropriate for ethnicity. Refer to RN assessment for further details.        HOSPITAL MEDICATIONS:  MEDICATIONS  (STANDING):  albuterol    0.083% 2.5 milliGRAM(s) Nebulizer every 6 hours  amLODIPine   Tablet 10 milliGRAM(s) Oral daily  apixaban 5 milliGRAM(s) Enteral Tube every 12 hours  atorvastatin 20 milliGRAM(s) Oral at bedtime  carvedilol 25 milliGRAM(s) Oral every 12 hours  chlorhexidine 0.12% Liquid 15 milliLiter(s) Oral Mucosa two times a day  chlorhexidine 2% Cloths 1 Application(s) Topical <User Schedule>  ciprofloxacin   IVPB 400 milliGRAM(s) IV Intermittent every 24 hours  ciprofloxacin   IVPB      dextrose 10% Bolus 125 milliLiter(s) IV Bolus once  dextrose 5%. 1000 milliLiter(s) (100 mL/Hr) IV Continuous <Continuous>  dextrose 5%. 1000 milliLiter(s) (50 mL/Hr) IV Continuous <Continuous>  dextrose 50% Injectable 25 Gram(s) IV Push once  dextrose 50% Injectable 12.5 Gram(s) IV Push once  epoetin reilly (EPOGEN) Injectable 90501 Unit(s) IV Push <User Schedule>  ferrous    sulfate Liquid 300 milliGRAM(s) Enteral Tube daily  glucagon  Injectable 1 milliGRAM(s) IntraMuscular once  hydrALAZINE 100 milliGRAM(s) Oral three times a day  insulin lispro (ADMELOG) corrective regimen sliding scale   SubCutaneous every 6 hours  insulin NPH human recombinant 4 Unit(s) SubCutaneous every 6 hours  lidocaine   4% Patch 2 Patch Transdermal every 24 hours  pantoprazole  Injectable 40 milliGRAM(s) IV Push every 12 hours  polyethylene glycol 3350 17 Gram(s) Oral daily  senna 2 Tablet(s) Oral at bedtime  sodium chloride 0.9% for Nebulization 3 milliLiter(s) Nebulizer every 6 hours    MEDICATIONS  (PRN):  acetaminophen   Oral Liquid .. 650 milliGRAM(s) Oral every 6 hours PRN Temp greater or equal to 38C (100.4F), Moderate Pain (4 - 6)  dextrose Oral Gel 15 Gram(s) Oral once PRN Blood Glucose LESS THAN 70 milliGRAM(s)/deciliter  hydrALAZINE Injectable 10 milliGRAM(s) IV Push every 8 hours PRN SBP > 180  sodium chloride 0.9% lock flush 10 milliLiter(s) IV Push every 1 hour PRN Pre/post blood products, medications, blood draw, and to maintain line patency      LABS:                        9.7    10.46 )-----------( 334      ( 10 Eze 2024 05:40 )             31.4     06-11    133<L>  |  95<L>  |  45<H>  ----------------------------<  166<H>  5.5<H>   |  22  |  3.60<H>    Ca    8.5      11 Jun 2024 06:51  Phos  2.9     06-11  Mg     2.40     06-11        Urinalysis Basic - ( 11 Jun 2024 06:51 )    Color: x / Appearance: x / SG: x / pH: x  Gluc: 166 mg/dL / Ketone: x  / Bili: x / Urobili: x   Blood: x / Protein: x / Nitrite: x   Leuk Esterase: x / RBC: x / WBC x   Sq Epi: x / Non Sq Epi: x / Bacteria: x         CHIEF COMPLAINT:Patient is a 73y old  Male who presents with a chief complaint of Unstable angina, abn EKG (11 Jun 2024 07:22)      INTERVAL EVENTS: no overnight events noted. SB noted on tele.     ROS: Seen by bedside during AM rounds     OBJECTIVE:  ICU Vital Signs Last 24 Hrs  T(C): 36.9 (11 Jun 2024 04:00), Max: 36.9 (11 Jun 2024 04:00)  T(F): 98.4 (11 Jun 2024 04:00), Max: 98.4 (11 Jun 2024 04:00)  HR: 56 (11 Jun 2024 04:00) (53 - 65)  BP: 142/58 (11 Jun 2024 04:00) (119/60 - 150/116)  BP(mean): 81 (11 Jun 2024 04:00) (77 - 129)  ABP: --  ABP(mean): --  RR: 18 (11 Jun 2024 04:00) (18 - 20)  SpO2: 100% (11 Jun 2024 04:00) (97% - 100%)    O2 Parameters below as of 11 Jun 2024 04:00  Patient On (Oxygen Delivery Method): tracheostomy collar  O2 Flow (L/min): 6            06-10 @ 07:01  -  06-11 @ 07:00  --------------------------------------------------------  IN: 1145 mL / OUT: 2400 mL / NET: -1255 mL      CAPILLARY BLOOD GLUCOSE      POCT Blood Glucose.: 187 mg/dL (11 Jun 2024 06:21)      PHYSICAL EXAM:  General: NAD. Sitting in bedside recliner.    Neck: (+) Trach tube noted, site c/d/i.  Cards: S1/S2, no murmurs   Pulm: CTA bilaterally. No wheezes.   Abdomen: Soft, NTND. (+) PEG noted, site c/d/i.   Extremities: RUE AVF with palpable thrill. Ext warm to touch.  No pedal edema. Intact distal pulses.  Neurology: Awake/alert. Follows commands. Nods head to questioning. 5/5 motor strength x4E.   Skin: warm to touch, color appropriate for ethnicity. Refer to RN assessment for further details.        HOSPITAL MEDICATIONS:  MEDICATIONS  (STANDING):  albuterol    0.083% 2.5 milliGRAM(s) Nebulizer every 6 hours  amLODIPine   Tablet 10 milliGRAM(s) Oral daily  apixaban 5 milliGRAM(s) Enteral Tube every 12 hours  atorvastatin 20 milliGRAM(s) Oral at bedtime  carvedilol 25 milliGRAM(s) Oral every 12 hours  chlorhexidine 0.12% Liquid 15 milliLiter(s) Oral Mucosa two times a day  chlorhexidine 2% Cloths 1 Application(s) Topical <User Schedule>  ciprofloxacin   IVPB 400 milliGRAM(s) IV Intermittent every 24 hours  ciprofloxacin   IVPB      dextrose 10% Bolus 125 milliLiter(s) IV Bolus once  dextrose 5%. 1000 milliLiter(s) (100 mL/Hr) IV Continuous <Continuous>  dextrose 5%. 1000 milliLiter(s) (50 mL/Hr) IV Continuous <Continuous>  dextrose 50% Injectable 25 Gram(s) IV Push once  dextrose 50% Injectable 12.5 Gram(s) IV Push once  epoetin reilly (EPOGEN) Injectable 45678 Unit(s) IV Push <User Schedule>  ferrous    sulfate Liquid 300 milliGRAM(s) Enteral Tube daily  glucagon  Injectable 1 milliGRAM(s) IntraMuscular once  hydrALAZINE 100 milliGRAM(s) Oral three times a day  insulin lispro (ADMELOG) corrective regimen sliding scale   SubCutaneous every 6 hours  insulin NPH human recombinant 4 Unit(s) SubCutaneous every 6 hours  lidocaine   4% Patch 2 Patch Transdermal every 24 hours  pantoprazole  Injectable 40 milliGRAM(s) IV Push every 12 hours  polyethylene glycol 3350 17 Gram(s) Oral daily  senna 2 Tablet(s) Oral at bedtime  sodium chloride 0.9% for Nebulization 3 milliLiter(s) Nebulizer every 6 hours    MEDICATIONS  (PRN):  acetaminophen   Oral Liquid .. 650 milliGRAM(s) Oral every 6 hours PRN Temp greater or equal to 38C (100.4F), Moderate Pain (4 - 6)  dextrose Oral Gel 15 Gram(s) Oral once PRN Blood Glucose LESS THAN 70 milliGRAM(s)/deciliter  hydrALAZINE Injectable 10 milliGRAM(s) IV Push every 8 hours PRN SBP > 180  sodium chloride 0.9% lock flush 10 milliLiter(s) IV Push every 1 hour PRN Pre/post blood products, medications, blood draw, and to maintain line patency      LABS:                        9.7    10.46 )-----------( 334      ( 10 Eze 2024 05:40 )             31.4     06-11    133<L>  |  95<L>  |  45<H>  ----------------------------<  166<H>  5.5<H>   |  22  |  3.60<H>    Ca    8.5      11 Jun 2024 06:51  Phos  2.9     06-11  Mg     2.40     06-11        Urinalysis Basic - ( 11 Jun 2024 06:51 )    Color: x / Appearance: x / SG: x / pH: x  Gluc: 166 mg/dL / Ketone: x  / Bili: x / Urobili: x   Blood: x / Protein: x / Nitrite: x   Leuk Esterase: x / RBC: x / WBC x   Sq Epi: x / Non Sq Epi: x / Bacteria: x

## 2024-06-11 NOTE — PROGRESS NOTE ADULT - SUBJECTIVE AND OBJECTIVE BOX
[facility]PHROLOGY [facility]ACT[facility]   MD ELIANE BHAGAT MD ANGELA WONG, PA    TEL:  OFFICE: [phone]  From 5pm-7am Answering Service [phone]    -- RENAL FOLLOW UP NOTE ---Date of Service 06-11-24 @ 13:15    Patient is a 73y old  Male who presents with a chief complaint of Unstable angina, abn EKG (11 Jun 2024 10:27)      Patient seen and examined at bedside. No chest pain/sob    VITALS:  T(F): 98.4 (06-11-24 @ 04:00), Max: 98.4 (06-11-24 @ 04:00)  HR: 56 (06-11-24 @ 12:11)  BP: 139/52 (06-11-24 @ 12:11)  RR: 18 (06-11-24 @ 12:11)  SpO2: 98% (06-11-24 @ 12:11)  Wt(kg): --    06-10 @ 07:01  -  06-11 @ 07:00  --------------------------------------------------------  IN: 1145 mL / OUT: 2400 mL / NET: -1255 mL          PHYSICAL EXAM:  General: NAD  Neck: + trach  Respiratory: CTAB, no wheezes, rales or rhonchi  Cardiovascular: S1, S2, RRR  Gastrointestinal: + peg soft, nt  Extremities: No peripheral edema    Hospital Medications:   MEDICATIONS  (STANDING):  albuterol    0.083% 2.5 milliGRAM(s) Nebulizer every 6 hours  amLODIPine   Tablet 10 milliGRAM(s) Oral daily  apixaban 5 milliGRAM(s) Enteral Tube every 12 hours  atorvastatin 20 milliGRAM(s) Oral at bedtime  carvedilol 25 milliGRAM(s) Oral every 12 hours  chlorhexidine 0.12% Liquid 15 milliLiter(s) Oral Mucosa two times a day  chlorhexidine 2% Cloths 1 Application(s) Topical <User Schedule>  ciprofloxacin   IVPB 400 milliGRAM(s) IV Intermittent every 24 hours  ciprofloxacin   IVPB      dextrose 10% Bolus 125 milliLiter(s) IV Bolus once  dextrose 5%. 1000 milliLiter(s) (100 mL/Hr) IV Continuous <Continuous>  dextrose 5%. 1000 milliLiter(s) (50 mL/Hr) IV Continuous <Continuous>  dextrose 50% Injectable 25 Gram(s) IV Push once  dextrose 50% Injectable 12.5 Gram(s) IV Push once  epoetin reilly (EPOGEN) Injectable 53604 Unit(s) IV Push <User Schedule>  ferrous    sulfate Liquid 300 milliGRAM(s) Enteral Tube daily  glucagon  Injectable 1 milliGRAM(s) IntraMuscular once  hydrALAZINE 100 milliGRAM(s) Oral three times a day  insulin lispro (ADMELOG) corrective regimen sliding scale   SubCutaneous every 6 hours  insulin NPH human recombinant 4 Unit(s) SubCutaneous every 6 hours  lidocaine   4% Patch 2 Patch Transdermal every 24 hours  pantoprazole  Injectable 40 milliGRAM(s) IV Push every 12 hours  polyethylene glycol 3350 17 Gram(s) Oral daily  senna 2 Tablet(s) Oral at bedtime  sodium chloride 0.9% for Nebulization 3 milliLiter(s) Nebulizer every 6 hours      LABS:  06-11    136  |  95<L>  |  48<H>  ----------------------------<  158<H>  4.0   |  28  |  3.72<H>    Ca    8.5      11 Jun 2024 08:38  Phos  2.7     06-11  Mg     2.40     06-11      Creatinine Trend: 3.72 <--, 3.60 <--, 5.16 <--, 2.77 <--, 4.72 <--, 3.55 <--, 4.69 <--, 5.15 <--    Phosphorus: 2.7 mg/dL (06-11 @ 08:38)  Phosphorus: 2.9 mg/dL (06-11 @ 06:51)                              9.7    10.46 )-----------( 334      ( 10 Eze 2024 05:40 )             31.4     Urine Studies:  Urinalysis - [06-11-24 @ 08:38]      Color  / Appearance  / SG  / pH       Gluc 158 / Ketone   / Bili  / Urobili        Blood  / Protein  / Leuk Est  / Nitrite       RBC  / WBC  / Hyaline  / Gran  / Sq Epi  / Non Sq Epi  / Bacteria       Iron 16, TIBC 121, %sat 13      [05-17-24 @ 06:00]  Ferritin 720      [05-17-24 @ 06:00]  PTH -- (Ca --)      [05-26-24 @ 01:20]   122  TSH 2.60      [05-17-24 @ 06:00]  Lipid: chol 86, , HDL 27, LDL --      [05-17-24 @ 06:00]    HBsAb <3.0      [06-03-24 @ 16:20]  HBsAg Nonreact      [06-03-24 @ 16:20]  HBcAb Nonreact      [06-03-24 @ 16:20]  HCV 0.09, Nonreact      [06-03-24 @ 16:20]      RADIOLOGY & ADDITIONAL STUDIES:

## 2024-06-11 NOTE — PROGRESS NOTE ADULT - ASSESSMENT
73-year-old male past medical history hypertension, ESRD on dialysis Monday Wednesday Friday, diabetes insulin-dependent presents with hiccups patient endorses persistent hiccups for the last 2 days. Nephrology consulted for HD needs.    A/P  ESRD:  Center: [place]  Nephrologist: Dr. Hardwick  Access: R AVF nonfunction now s/p thrombectomy 6/4.  HD via AVF working well 6/5;  shiley removed 6/6  last hd 6/10 tolerated well uf 2L  Continue MWF  Consent obtained and placed in ED chart  Renal diet  Monitor BMP - no labs today.  Consider [facility] for rehab where his outpatient Nephrologist, Dr. Hardwick can follow him.    Hyperkalemia/Hypokalemia  Improved w/ HD.  C/W HD schedule as above.  Lokelma 10gm PRN for K >5.3 on non-HD days  Low K diet.  Monitor     HTN:  BP  controlled.  On carvedilol 12.5mg BID, hydralazine 100mg TID.  On norvasc -- > down titrate if BP remains marginal / controlled.  UF w/ HD as BP permits.  Monitor BP.    Anemia:  Hgb stable  + iron deficiency.  Tsat 13% - on ferrous sulfate 300mg qd via PEG.  Venofer held d/t leukocytosis.  SILVA w/ HD.  Transfuse for Hgb <7.  Monitor Hb.    CKD-MBD   - low for CKD.  PO4 low; repleted w/ PhosNaK on 6/8.  Supplement PO4 as needed.  Sevelamer 1600mg TID d/c'ed 5/21.  Monitor Ca, PO4 daily    Hypocalcemia:  In setting of CKD vs. hypoalbuminemia.  Optimize albumin.  Corrected Ca WNL.  Replete as needed.  Monitor Ca.    Hyponatremia  in setting of esrd   UF w/ HD.  Free fluid restriction to 1L/day.  monitor

## 2024-06-11 NOTE — SWALLOW BEDSIDE ASSESSMENT ADULT - ORAL PHASE
Decreased anterior-posterior movement of the bolus
Within functional limits
Within functional limits

## 2024-06-11 NOTE — PROGRESS NOTE ADULT - SUBJECTIVE AND OBJECTIVE BOX
Patient is a 73y old  Male who presents with a chief complaint of Unstable angina, abn EKG (11 Jun 2024 15:28)      SUBJECTIVE / OVERNIGHT EVENTS: remains stable, on trache collar, dispo to acute rehab    MEDICATIONS  (STANDING):  albuterol    0.083% 2.5 milliGRAM(s) Nebulizer every 6 hours  amLODIPine   Tablet 10 milliGRAM(s) Oral daily  apixaban 5 milliGRAM(s) Enteral Tube every 12 hours  aspirin  chewable 81 milliGRAM(s) Oral daily  atorvastatin 20 milliGRAM(s) Oral at bedtime  carvedilol 25 milliGRAM(s) Oral every 12 hours  chlorhexidine 0.12% Liquid 15 milliLiter(s) Oral Mucosa two times a day  chlorhexidine 2% Cloths 1 Application(s) Topical <User Schedule>  ciprofloxacin   IVPB 400 milliGRAM(s) IV Intermittent every 24 hours  ciprofloxacin   IVPB      dextrose 10% Bolus 125 milliLiter(s) IV Bolus once  dextrose 5%. 1000 milliLiter(s) (100 mL/Hr) IV Continuous <Continuous>  dextrose 5%. 1000 milliLiter(s) (50 mL/Hr) IV Continuous <Continuous>  dextrose 50% Injectable 25 Gram(s) IV Push once  dextrose 50% Injectable 12.5 Gram(s) IV Push once  epoetin reilly (EPOGEN) Injectable 48333 Unit(s) IV Push <User Schedule>  ferrous    sulfate Liquid 300 milliGRAM(s) Enteral Tube daily  glucagon  Injectable 1 milliGRAM(s) IntraMuscular once  hydrALAZINE 100 milliGRAM(s) Oral three times a day  insulin lispro (ADMELOG) corrective regimen sliding scale   SubCutaneous every 6 hours  insulin NPH human recombinant 4 Unit(s) SubCutaneous every 6 hours  lidocaine   4% Patch 2 Patch Transdermal every 24 hours  pantoprazole  Injectable 40 milliGRAM(s) IV Push every 12 hours  polyethylene glycol 3350 17 Gram(s) Oral daily  senna 2 Tablet(s) Oral at bedtime  sodium chloride 0.9% for Nebulization 3 milliLiter(s) Nebulizer every 6 hours    MEDICATIONS  (PRN):  acetaminophen   Oral Liquid .. 650 milliGRAM(s) Oral every 6 hours PRN Temp greater or equal to 38C (100.4F), Moderate Pain (4 - 6)  dextrose Oral Gel 15 Gram(s) Oral once PRN Blood Glucose LESS THAN 70 milliGRAM(s)/deciliter  hydrALAZINE Injectable 10 milliGRAM(s) IV Push every 8 hours PRN SBP > 180  sodium chloride 0.9% lock flush 10 milliLiter(s) IV Push every 1 hour PRN Pre/post blood products, medications, blood draw, and to maintain line patency      Vital Signs Last 24 Hrs  T(F): 97.8 (06-11-24 @ 18:00), Max: 98.4 (06-11-24 @ 04:00)  HR: 55 (06-11-24 @ 20:00) (55 - 72)  BP: 120/76 (06-11-24 @ 20:00) (120/76 - 150/116)  RR: 20 (06-11-24 @ 20:00) (18 - 20)  SpO2: 100% (06-11-24 @ 20:00) (96% - 100%)  Telemetry:   CAPILLARY BLOOD GLUCOSE      POCT Blood Glucose.: 167 mg/dL (11 Jun 2024 17:12)  POCT Blood Glucose.: 157 mg/dL (11 Jun 2024 11:09)  POCT Blood Glucose.: 187 mg/dL (11 Jun 2024 06:21)  POCT Blood Glucose.: 172 mg/dL (11 Jun 2024 01:01)  POCT Blood Glucose.: 173 mg/dL (10 Eze 2024 23:41)    I&O's Summary    10 Eze 2024 07:01  -  11 Jun 2024 07:00  --------------------------------------------------------  IN: 1145 mL / OUT: 2400 mL / NET: -1255 mL        PHYSICAL EXAM:  GENERAL: NAD, well-developed  HEAD:  Atraumatic, Normocephalic  EYES: EOMI, PERRLA, conjunctiva and sclera clear  NECK: Supple, No JVD  CHEST/LUNG: Clear to auscultation bilaterally; No wheeze  HEART: Regular rate and rhythm; No murmurs, rubs, or gallops  ABDOMEN: Soft, Nontender, Nondistended; Bowel sounds present  EXTREMITIES:  2+ Peripheral Pulses, No clubbing, cyanosis, or edema  PSYCH: AAOx3  NEUROLOGY: non-focal  SKIN: No rashes or lesions    LABS:                        9.7    10.46 )-----------( 334      ( 10 Eze 2024 05:40 )             31.4     06-11    136  |  95<L>  |  48<H>  ----------------------------<  158<H>  4.0   |  28  |  3.72<H>    Ca    8.5      11 Jun 2024 08:38  Phos  2.7     06-11  Mg     2.40     06-11            Urinalysis Basic - ( 11 Jun 2024 08:38 )    Color: x / Appearance: x / SG: x / pH: x  Gluc: 158 mg/dL / Ketone: x  / Bili: x / Urobili: x   Blood: x / Protein: x / Nitrite: x   Leuk Esterase: x / RBC: x / WBC x   Sq Epi: x / Non Sq Epi: x / Bacteria: x        RADIOLOGY & ADDITIONAL TESTS:    Imaging Personally Reviewed:    Consultant(s) Notes Reviewed:      Care Discussed with Consultants/Other Providers:

## 2024-06-11 NOTE — SWALLOW BEDSIDE ASSESSMENT ADULT - PHARYNGEAL PHASE
Delayed pharyngeal swallow/Delayed cough post oral intake
Delayed pharyngeal swallow/Cough post oral intake
Delayed pharyngeal swallow/Cough post oral intake

## 2024-06-11 NOTE — PROGRESS NOTE ADULT - ASSESSMENT
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 Baclofen started and course complicated by AMS second to baclofen toxicity requiring intubation and MICU transfer. Course further complicated by LEFT pontine and cerebellar stroke, R AVF stenosis, aspiration and B Cereus bacteremia and RLE DVT. s/p trach and transferred to RCU 5/16 and course complicated by intermittent fever spikes with likely aspiration citrobacter PNA, AOCD, and GIB vs Fe stools. s/p fistulogram 6/4 and able to wean off vent to TC ATC.     NEUROLOGY  # AMS   - MRI HEAD (5/6) with punctate foci in the left talya and left cerebellum with concern for acute infarct.   - MRA HEAD and NECK (5/6) with no large vessel occlusion or stenosis.  - Continue on Lipitor for medical management     PSYCH   # Depression   - Concern for depression with questionable SI   - CO started, however formal discussion and evaluation with no true SI   - Supportive care and encouragement QD    CARDIOVASCULAR  # CP with NSTEMI < demand ischemia with HTN   - Admitted for CP and HTN with peaked T WAVES and ECG with ST deviation on admission   - Troponins mildly elevated on admission with negative CKMB   - TTE (4/30) with EF 72, normal LVRVSF, and no regional wall motion abnormalities   - No need to pursue cath at this time     # Septic vs vasoplegic shock  # Hx of HTN   - Home BP medications held and pressors weaned off   - Continue on coreg 25 (increased 6/1), hydralazine 100 TID and Norvasc 10mg (increased 5/31)   - Monitor blood pressures with Hydralazine 10mg IVP Q8H PRN   - IF remains hypertensive then add Isordil     RESPIRATORY  # Respiratory failure   - AMS noted with baclofen toxicity requiring intubation   - Attempted extubation in MICU however course complicated by aspiration and prolonged course and now s/p tracheostomy with IP on 5/14  - Continued on vent 12/500/5/30 and weaned to TC ATC   - Continue on TC ATC (5/30 - )   - Able to tolerate PMV during the day with good phonation   - Copious secretions this secretions but hold HTS as strong cough and continue on albuterol and NS 0.9%    GI  # Dysphagia   - s/p surgical PEG 5/17   - Failed green dye on 6/1 and continue on tube feeds via PEG   - Continue with PMV during the day this week and reattempt SS sometime this week.     # GIB  - Noted with several episodes of black stool with drop in Hgb (5/26 overnight) requiring PRBC with appropriate response  - Case discussed with GI, questionable anemia from AOCD with ESRD and black stool likely from iron, and no plan for scope at this time  - Continue on PPI and monitor stools    RENAL  # ESRD on HD MWF with R BCF  # Fistula stenosis   - VA AVF (5/2) with Right radiocephalic arteriovenous fistula has no hemodynamically significant stenosis present at the anastomotic site ( cm/sec, EDV 49 cm/sec). The usable segment is partially thrombosed with minimal patency.  - Required R FEMORAL line on medicine, then removed on 5/2, and replaced with R IJ SHILEY on 5/3 for CRRT then converted back to iHD MWF   - R IJ SHILEY removed on 5/10 second to bacteremia and replaced on 5/13  - R IJ SHILEY removed 5/27 given intermittent fevers    - Blood cultures negative and replaced SHILEY on 6/1   - Vein mapping with no adequate veins in UE for conduit (all <2 mm and/or thrombosed) and pending possible revision of RUE AVF.   - s/p RUE Fistuloplasty on 6/4   - s/p Successfully HD via RUE AVF on 6/5  - s/p R IJ Shiley removed 6/6  - Continue on HD MWF per Renal     # Hyperphosphatemia   - Renvela 1600 however phos corrects well with HD   - Monitor off Renvela     INFECTIOUS DISEASE  # Citrobacter aspiration vs trachitis concern   - Recurrent fever spike and CXR with RLL opacity   - BCx (5/25 and 5/30) negative   - SCx (5/25) negative   - SCx (5/30) with citrobacter and completed Zosyn empirically (5/18 - 5/28)  - Thick secretions without fever and RPT SCx negative, however will empirically tx with cipro (6/7 - )     # B Cereus Bacteremia   - Course complicated by fevers and aspiration event   - MRSA (5/1) negative   - BCx (5/2) negative   - SCx (5/4) and BAL (5/12) negative   - BCx (5/10) with bacillus cereus and cleared on (5/12).   - Case discussed with ID and s/p Vancomycin through 5/21 x 10 day course.   - Continue to monitor off antibiotics per ID     HEME  # AOCD with ESRD   - Anemia panel with AOCD and low % sat   - Continue on EPO 10K and Ferrous supplement  - s/p PRBC on 5/16, 5/26 and 6/7 and will monitor HH     VASCULAR   # RLE DVT   - CT AP (5/12) with nonocclusive RIGHT common iliac vein deep venous thrombosis and case discussed with IR with no plans for IVC filter.   - RLE duplex 5/28 with no evidence of DVT  - Initially started on a Heparin GTT with course complicated by questionable GIB given dark tarry stools, however more likely second to iron tabs.   - Discontinued heparin GTT 6/6 and transitioned to Eliquis    ENDOCRINE  # IDDM2 A1C 6.9  - c/w NPH 4U Q6 and ISS  - Monitor and adjust insulin based on FS     SKIN  - R FEMORAL (5/1-5/2)   - R IJ SHILEY (5/3-5/10)   - R IJ SHILEY (5/13 - 5/27)   - R IJ SHILEY (6/1 - 6/6)     ETHICS/ GOC    - FULL CODE     DISPO - PMR recc ACUTE   72 YO M with PMHx of ESRD on HD MWF, HTN, and 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 Baclofen started and course complicated by AMS second to baclofen toxicity requiring intubation and MICU transfer. Course further complicated by LEFT pontine and cerebellar stroke, R AVF stenosis requiring fistuloplasty, aspiration PNA, and B Cereus bacteremia and RLE DVT. s/p trach and transferred to RCU 5/16 and course complicated by intermittent fever spikes with likely aspiration citrobacter PNA, AOCD, and GIB vs Fe stools. s/p fistulogram 6/4 and able to wean off vent to TC ATC.     NEUROLOGY  # AMS   - MRI HEAD (5/6) with punctate foci in the left talya and left cerebellum with concern for acute infarct.   - MRA HEAD and NECK (5/6) with no large vessel occlusion or stenosis.  - Continue on Lipitor for medical management     PSYCH   # Depression   - Concern for depression with questionable SI   - CO started, however formal discussion and evaluation with no true SI   - Supportive care and encouragement QD    CARDIOVASCULAR  # CP with NSTEMI < demand ischemia with HTN   - Admitted for CP and HTN with peaked T WAVES and ECG with ST deviation on admission   - Troponins mildly elevated on admission with negative CKMB   - TTE (4/30) with EF 72, normal LVRVSF, and no regional wall motion abnormalities   - No need to pursue cath at this time     # Septic vs vasoplegic shock  # Hx of HTN   - Home BP medications held and pressors weaned off   - Continue on coreg 25 (increased 6/1), hydralazine 100 TID and Norvasc 10mg (increased 5/31)   - Monitor blood pressures with Hydralazine 10mg IVP Q8H PRN   - IF remains hypertensive then add Isordil     RESPIRATORY  # Respiratory failure   - AMS noted with baclofen toxicity requiring intubation   - Attempted extubation in MICU however course complicated by aspiration and prolonged course and now s/p tracheostomy with IP on 5/14  - Continued on vent 12/500/5/30 and weaned to TC ATC   - Continue on TC ATC (5/30 - )   - Able to tolerate PMV during the day with good phonation   - Copious secretions this secretions but hold HTS as strong cough and continue on albuterol and NS 0.9%    GI  # Dysphagia   - s/p surgical PEG 5/17   - Failed green dye on 6/1 and continue on tube feeds via PEG   - Continue with PMV during the day this week and reattempt SS sometime this week.   - RPT SLP (6/11) again with e/o gross aspiration via green dye test    # GIB  - Noted with several episodes of black stool with drop in Hgb (5/26 overnight) requiring PRBC with appropriate response  - Case discussed with GI, questionable anemia from AOCD with ESRD and black stool likely from iron, and no plan for scope at this time  - Continue on PPI and monitor stools    RENAL  # ESRD on HD MWF with R BCF  # Fistula stenosis   - VA AVF (5/2) with Right radiocephalic arteriovenous fistula has no hemodynamically significant stenosis present at the anastomotic site ( cm/sec, EDV 49 cm/sec). The usable segment is partially thrombosed with minimal patency.  - Required R FEMORAL line on medicine, then removed on 5/2, and replaced with R IJ SHILEY on 5/3 for CRRT then converted back to iHD MWF   - R IJ SHILEY removed on 5/10 second to bacteremia and replaced on 5/13  - R IJ SHILEY removed 5/27 given intermittent fevers    - Blood cultures negative and replaced SHILEY on 6/1   - Vein mapping with no adequate veins in UE for conduit (all <2 mm and/or thrombosed) and pending possible revision of RUE AVF.   - s/p RUE Fistuloplasty on 6/4   - s/p Successfully HD via RUE AVF on 6/5  - s/p R IJ Shiley removed 6/6  - Continue on HD MWF per Renal     # Hyperphosphatemia   - Renvela 1600 however phos corrects well with HD   - Monitor off Renvela     INFECTIOUS DISEASE  # Citrobacter aspiration vs trachitis concern   - Recurrent fever spike and CXR with RLL opacity   - BCx (5/25 and 5/30) negative   - SCx (5/25) negative   - SCx (5/30) with citrobacter and completed Zosyn empirically (5/18 - 5/28)  - Thick secretions without fever and RPT SCx negative, however will empirically tx with cipro (6/7 - )     # B Cereus Bacteremia   - Course complicated by fevers and aspiration event   - MRSA (5/1) negative   - BCx (5/2) negative   - SCx (5/4) and BAL (5/12) negative   - BCx (5/10) with bacillus cereus and cleared on (5/12).   - Case discussed with ID and s/p Vancomycin through 5/21 x 10 day course.   - Continue to monitor off antibiotics per ID     HEME  # AOCD with ESRD   - Anemia panel with AOCD and low % sat   - Continue on EPO 10K and Ferrous supplement  - s/p PRBC on 5/16, 5/26 and 6/7 and will monitor HH     VASCULAR   # RLE DVT   - CT AP (5/12) with nonocclusive RIGHT common iliac vein deep venous thrombosis and case discussed with IR with no plans for IVC filter.   - RLE duplex 5/28 with no evidence of DVT  - Initially started on a Heparin GTT with course complicated by questionable GIB given dark tarry stools, however more likely second to iron tabs.   - Discontinued heparin GTT 6/6 and transitioned to Eliquis    ENDOCRINE  # IDDM2 A1C 6.9  - c/w NPH 4U Q6 and ISS  - Monitor and adjust insulin based on FS     SKIN  - R FEMORAL (5/1-5/2)   - R IJ SHILEY (5/3-5/10)   - R IJ SHILEY (5/13 - 5/27)   - R IJ SHILEY (6/1 - 6/6)     ETHICS/ GOC    - FULL CODE     DISPO - PMR recc ACUTE

## 2024-06-11 NOTE — SWALLOW BEDSIDE ASSESSMENT ADULT - COMMENTS
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 Baclofen started and course complicated by AMS second to baclofen toxicity requiring intubation and MICU transfer. Course further complicated by LEFT pontine and cerebellar stroke, R AVF stenosis, aspiration and B Cereus bacteremia and RLE DVT. s/p trach and transferred to RCU 5/16 and course complicated by intermittent fever spikes with likely aspiration citrobacter PNA, AOCD, and GIB vs Fe stools. s/p fistulogram 6/4 and able to wean off vent to TC ATC."    CXR 5/25 (most recent): IMPRESSION: Follow-up with new haziness in the lower left hemithorax most consistent with layering effusion and atelectasis.    Of Note: Pt known to speech dept at [facility]; seen most recently during this admission for Green Dye Test on 6/1 with results indicative of gross aspiration and recommendation to continue NPO w/nonoral nutrition/hydration (see note for details).    Pt received awake, alert, upright in recliner chair, on 28% trach collar, [place] Speaking Valve in place, pt voicing well. Pt agreeable to reassessment upon procedure for exam.

## 2024-06-12 LAB
CULTURE RESULTS: SIGNIFICANT CHANGE UP
GLUCOSE BLDC GLUCOMTR-MCNC: 121 MG/DL — HIGH (ref 70–99)
GLUCOSE BLDC GLUCOMTR-MCNC: 165 MG/DL — HIGH (ref 70–99)
GLUCOSE BLDC GLUCOMTR-MCNC: 166 MG/DL — HIGH (ref 70–99)
GLUCOSE BLDC GLUCOMTR-MCNC: 169 MG/DL — HIGH (ref 70–99)
SPECIMEN SOURCE: SIGNIFICANT CHANGE UP

## 2024-06-12 PROCEDURE — 99233 SBSQ HOSP IP/OBS HIGH 50: CPT

## 2024-06-12 RX ADMIN — POLYETHYLENE GLYCOL 3350 17 GRAM(S): 17 POWDER, FOR SOLUTION ORAL at 11:04

## 2024-06-12 RX ADMIN — Medication 100 MILLIGRAM(S): at 11:04

## 2024-06-12 RX ADMIN — Medication 100 MILLIGRAM(S): at 19:28

## 2024-06-12 RX ADMIN — Medication 200 MILLIGRAM(S): at 11:09

## 2024-06-12 RX ADMIN — LIDOCAINE 2 PATCH: 4 CREAM TOPICAL at 06:34

## 2024-06-12 RX ADMIN — APIXABAN 5 MILLIGRAM(S): 2.5 TABLET, FILM COATED ORAL at 05:45

## 2024-06-12 RX ADMIN — Medication 0: at 11:12

## 2024-06-12 RX ADMIN — LIDOCAINE 2 PATCH: 4 CREAM TOPICAL at 15:03

## 2024-06-12 RX ADMIN — ALBUTEROL 2.5 MILLIGRAM(S): 90 AEROSOL, METERED ORAL at 11:12

## 2024-06-12 RX ADMIN — HUMAN INSULIN 4 UNIT(S): 100 INJECTION, SUSPENSION SUBCUTANEOUS at 06:19

## 2024-06-12 RX ADMIN — PANTOPRAZOLE SODIUM 40 MILLIGRAM(S): 20 TABLET, DELAYED RELEASE ORAL at 05:46

## 2024-06-12 RX ADMIN — ALBUTEROL 2.5 MILLIGRAM(S): 90 AEROSOL, METERED ORAL at 03:47

## 2024-06-12 RX ADMIN — ALBUTEROL 2.5 MILLIGRAM(S): 90 AEROSOL, METERED ORAL at 21:41

## 2024-06-12 RX ADMIN — HUMAN INSULIN 4 UNIT(S): 100 INJECTION, SUSPENSION SUBCUTANEOUS at 17:07

## 2024-06-12 RX ADMIN — Medication 2: at 17:06

## 2024-06-12 RX ADMIN — PANTOPRAZOLE SODIUM 40 MILLIGRAM(S): 20 TABLET, DELAYED RELEASE ORAL at 17:03

## 2024-06-12 RX ADMIN — HUMAN INSULIN 4 UNIT(S): 100 INJECTION, SUSPENSION SUBCUTANEOUS at 00:21

## 2024-06-12 RX ADMIN — Medication 81 MILLIGRAM(S): at 11:04

## 2024-06-12 RX ADMIN — ATORVASTATIN CALCIUM 20 MILLIGRAM(S): 80 TABLET, FILM COATED ORAL at 21:26

## 2024-06-12 RX ADMIN — Medication 2: at 00:20

## 2024-06-12 RX ADMIN — HUMAN INSULIN 4 UNIT(S): 100 INJECTION, SUSPENSION SUBCUTANEOUS at 11:13

## 2024-06-12 RX ADMIN — APIXABAN 5 MILLIGRAM(S): 2.5 TABLET, FILM COATED ORAL at 17:03

## 2024-06-12 RX ADMIN — LIDOCAINE 2 PATCH: 4 CREAM TOPICAL at 00:27

## 2024-06-12 RX ADMIN — CHLORHEXIDINE GLUCONATE 15 MILLILITER(S): 213 SOLUTION TOPICAL at 05:45

## 2024-06-12 RX ADMIN — ALBUTEROL 2.5 MILLIGRAM(S): 90 AEROSOL, METERED ORAL at 16:01

## 2024-06-12 RX ADMIN — Medication 100 MILLIGRAM(S): at 03:34

## 2024-06-12 RX ADMIN — Medication 2: at 06:20

## 2024-06-12 RX ADMIN — AMLODIPINE BESYLATE 10 MILLIGRAM(S): 2.5 TABLET ORAL at 05:45

## 2024-06-12 RX ADMIN — CHLORHEXIDINE GLUCONATE 15 MILLILITER(S): 213 SOLUTION TOPICAL at 17:04

## 2024-06-12 RX ADMIN — ERYTHROPOIETIN 10000 UNIT(S): 10000 INJECTION, SOLUTION INTRAVENOUS; SUBCUTANEOUS at 10:38

## 2024-06-12 RX ADMIN — SENNA PLUS 2 TABLET(S): 8.6 TABLET ORAL at 21:26

## 2024-06-12 RX ADMIN — Medication 300 MILLIGRAM(S): at 11:04

## 2024-06-12 RX ADMIN — LIDOCAINE 2 PATCH: 4 CREAM TOPICAL at 19:32

## 2024-06-12 RX ADMIN — CHLORHEXIDINE GLUCONATE 1 APPLICATION(S): 213 SOLUTION TOPICAL at 05:46

## 2024-06-12 NOTE — PROGRESS NOTE ADULT - ASSESSMENT
74 YO M with PMHx of ESRD on HD MWF, HTN, and 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 Baclofen started and course complicated by AMS second to baclofen toxicity requiring intubation and MICU transfer. Course further complicated by LEFT pontine and cerebellar stroke, R AVF stenosis requiring fistuloplasty, aspiration PNA, and B Cereus bacteremia and RLE DVT. s/p trach and transferred to RCU 5/16 and course complicated by intermittent fever spikes with likely aspiration citrobacter PNA, AOCD, and GIB vs Fe stools. s/p fistulogram 6/4 and able to wean off vent to TC ATC.     NEUROLOGY  # AMS   - MRI HEAD (5/6) with punctate foci in the left talya and left cerebellum with concern for acute infarct.   - MRA HEAD and NECK (5/6) with no large vessel occlusion or stenosis.  - Continue on Lipitor for medical management     PSYCH   # Depression   - Concern for depression with questionable SI   - CO started, however formal discussion and evaluation with no true SI   - Supportive care and encouragement QD    CARDIOVASCULAR  # CP with NSTEMI < demand ischemia with HTN   - Admitted for CP and HTN with peaked T WAVES and ECG with ST deviation on admission   - Troponins mildly elevated on admission with negative CKMB   - TTE (4/30) with EF 72, normal LVRVSF, and no regional wall motion abnormalities   - No need to pursue cath at this time     # Septic vs vasoplegic shock  # Hx of HTN   - Home BP medications held and pressors weaned off   - Continue on coreg 25 (increased 6/1), hydralazine 100 TID and Norvasc 10mg (increased 5/31)   - Monitor blood pressures with Hydralazine 10mg IVP Q8H PRN   - IF remains hypertensive then add Isordil     RESPIRATORY  # Respiratory failure   - AMS noted with baclofen toxicity requiring intubation   - Attempted extubation in MICU however course complicated by aspiration and prolonged course and now s/p tracheostomy with IP on 5/14  - Continued on vent 12/500/5/30 and weaned to TC ATC   - Continue on TC ATC (5/30 - )   - Able to tolerate PMV during the day with good phonation   - Copious secretions this secretions but hold HTS as strong cough and continue on albuterol and NS 0.9%    GI  # Dysphagia   - s/p surgical PEG 5/17   - Failed green dye on 6/1 and continue on tube feeds via PEG   - Continue with PMV during the day this week and reattempt SS sometime this week.   - RPT SLP (6/11) again with e/o gross aspiration via green dye test    # GIB  - Noted with several episodes of black stool with drop in Hgb (5/26 overnight) requiring PRBC with appropriate response  - Case discussed with GI, questionable anemia from AOCD with ESRD and black stool likely from iron, and no plan for scope at this time  - Continue on PPI and monitor stools    RENAL  # ESRD on HD MWF with R BCF  # Fistula stenosis   - VA AVF (5/2) with Right radiocephalic arteriovenous fistula has no hemodynamically significant stenosis present at the anastomotic site ( cm/sec, EDV 49 cm/sec). The usable segment is partially thrombosed with minimal patency.  - Required R FEMORAL line on medicine, then removed on 5/2, and replaced with R IJ SHILEY on 5/3 for CRRT then converted back to iHD MWF   - R IJ SHILEY removed on 5/10 second to bacteremia and replaced on 5/13  - R IJ SHILEY removed 5/27 given intermittent fevers    - Blood cultures negative and replaced SHILEY on 6/1   - Vein mapping with no adequate veins in UE for conduit (all <2 mm and/or thrombosed) and pending possible revision of RUE AVF.   - s/p RUE Fistuloplasty on 6/4   - s/p Successfully HD via RUE AVF on 6/5  - s/p R IJ Shiley removed 6/6  - Continue on HD MWF per Renal     # Hyperphosphatemia   - Renvela 1600 however phos corrects well with HD   - Monitor off Renvela     INFECTIOUS DISEASE  # Citrobacter aspiration vs trachitis concern   - Recurrent fever spike and CXR with RLL opacity   - BCx (5/25 and 5/30) negative   - SCx (5/25) negative   - SCx (5/30) with citrobacter and completed Zosyn empirically (5/18 - 5/28)  - Thick secretions without fever and RPT SCx negative, however will empirically tx with cipro (6/7 - )     # B Cereus Bacteremia   - Course complicated by fevers and aspiration event   - MRSA (5/1) negative   - BCx (5/2) negative   - SCx (5/4) and BAL (5/12) negative   - BCx (5/10) with bacillus cereus and cleared on (5/12).   - Case discussed with ID and s/p Vancomycin through 5/21 x 10 day course.   - Continue to monitor off antibiotics per ID     HEME  # AOCD with ESRD   - Anemia panel with AOCD and low % sat   - Continue on EPO 10K and Ferrous supplement  - s/p PRBC on 5/16, 5/26 and 6/7 and will monitor HH     VASCULAR   # RLE DVT   - CT AP (5/12) with nonocclusive RIGHT common iliac vein deep venous thrombosis and case discussed with IR with no plans for IVC filter.   - RLE duplex 5/28 with no evidence of DVT  - Initially started on a Heparin GTT with course complicated by questionable GIB given dark tarry stools, however more likely second to iron tabs.   - Discontinued heparin GTT 6/6 and transitioned to Eliquis    ENDOCRINE  # IDDM2 A1C 6.9  - c/w NPH 4U Q6 and ISS  - Monitor and adjust insulin based on FS     SKIN  - R FEMORAL (5/1-5/2)   - R IJ SHILEY (5/3-5/10)   - R IJ SHILEY (5/13 - 5/27)   - R IJ SHILEY (6/1 - 6/6)     ETHICS/ GOC    - FULL CODE     DISPO - PMR recc ACUTE   72 YO M with PMHx of ESRD on HD MWF, HTN, and 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 Baclofen started and course complicated by AMS second to baclofen toxicity requiring intubation and MICU transfer. Course further complicated by LEFT pontine and cerebellar stroke, R AVF stenosis requiring fistuloplasty, aspiration PNA, and B Cereus bacteremia and RLE DVT. s/p trach and transferred to RCU 5/16 and course complicated by intermittent fever spikes with likely aspiration citrobacter PNA, AOCD, and GIB vs Fe stools. s/p fistulogram 6/4 and able to wean off vent to TC ATC.     NEUROLOGY  # AMS   - MRI HEAD (5/6) with punctate foci in the left talya and left cerebellum with concern for acute infarct.   - MRA HEAD and NECK (5/6) with no large vessel occlusion or stenosis.  - Continue on Lipitor for medical management     PSYCH   # Depression   - Concern for depression with questionable SI   - CO started, however formal discussion and evaluation with no true SI   - Supportive care and encouragement QD    CARDIOVASCULAR  # CP with NSTEMI < demand ischemia with HTN   - Admitted for CP and HTN with peaked T WAVES and ECG with ST deviation on admission   - Troponins mildly elevated on admission with negative CKMB   - TTE (4/30) with EF 72, normal LVRVSF, and no regional wall motion abnormalities   - No need to pursue cath at this time     # Septic vs vasoplegic shock  # Hx of HTN   - Home BP medications held and pressors weaned off   - Continue on coreg 25 (increased 6/1), hydralazine 100 TID and Norvasc 10mg (increased 5/31)   - Monitor blood pressures with Hydralazine 10mg IVP Q8H PRN   - IF remains hypertensive then add Isordil     RESPIRATORY  # Respiratory failure   - AMS noted with baclofen toxicity requiring intubation   - Attempted extubation in MICU however course complicated by aspiration and prolonged course and now s/p tracheostomy with IP on 5/14  - Continued on vent 12/500/5/30 and weaned to TC ATC   - Continue on TC ATC (5/30 - )   - Able to tolerate PMV during the day with good phonation   - Copious secretions this secretions but hold HTS as strong cough and continue on albuterol and NS 0.9%  - 6/12 improved secretions     GI  # Dysphagia   - s/p surgical PEG 5/17   - Failed green dye on 6/1 and continue on tube feeds via PEG   - Continue with PMV during the day this week and reattempt SS sometime this week.   - RPT SLP (6/11) again with e/o gross aspiration via green dye test    # GIB  - Noted with several episodes of black stool with drop in Hgb (5/26 overnight) requiring PRBC with appropriate response  - Case discussed with GI, questionable anemia from AOCD with ESRD and black stool likely from iron, and no plan for scope at this time  - Continue on PPI and monitor stools    RENAL  # ESRD on HD MWF with R BCF  # Fistula stenosis   - VA AVF (5/2) with Right radiocephalic arteriovenous fistula has no hemodynamically significant stenosis present at the anastomotic site ( cm/sec, EDV 49 cm/sec). The usable segment is partially thrombosed with minimal patency.  - Required R FEMORAL line on medicine, then removed on 5/2, and replaced with R IJ SHILEY on 5/3 for CRRT then converted back to iHD MWF   - R IJ SHILEY removed on 5/10 second to bacteremia and replaced on 5/13  - R IJ SHILEY removed 5/27 given intermittent fevers    - Blood cultures negative and replaced SHILEY on 6/1   - Vein mapping with no adequate veins in UE for conduit (all <2 mm and/or thrombosed) and pending possible revision of RUE AVF.   - s/p RUE Fistuloplasty on 6/4   - s/p Successfully HD via RUE AVF on 6/5  - s/p R IJ Shiley removed 6/6  - Continue on HD MWF per Renal     # Hyperphosphatemia   - Renvela 1600 however phos corrects well with HD   - Monitor off Renvela     INFECTIOUS DISEASE  # Citrobacter aspiration vs trachitis concern   - Recurrent fever spike and CXR with RLL opacity   - BCx (5/25 and 5/30) negative   - SCx (5/25) negative   - SCx (5/30) with citrobacter and completed Zosyn empirically (5/18 - 5/28)  - Thick secretions without fever and RPT SCx negative, however will empirically tx with cipro (6/7 - )   - secretions improved 6/12    # B Cereus Bacteremia   - Course complicated by fevers and aspiration event   - MRSA (5/1) negative   - BCx (5/2) negative   - SCx (5/4) and BAL (5/12) negative   - BCx (5/10) with bacillus cereus and cleared on (5/12).   - Case discussed with ID and s/p Vancomycin through 5/21 x 10 day course.   - Continue to monitor off antibiotics per ID     HEME  # AOCD with ESRD   - Anemia panel with AOCD and low % sat   - Continue on EPO 10K and Ferrous supplement  - s/p PRBC on 5/16, 5/26 and 6/7 and will monitor HH     VASCULAR   # RLE DVT   - CT AP (5/12) with nonocclusive RIGHT common iliac vein deep venous thrombosis and case discussed with IR with no plans for IVC filter.   - RLE duplex 5/28 with no evidence of DVT  - Initially started on a Heparin GTT with course complicated by questionable GIB given dark tarry stools, however more likely second to iron tabs.   - Discontinued heparin GTT 6/6 and transitioned to Eliquis    ENDOCRINE  # IDDM2 A1C 6.9  - c/w NPH 4U Q6 and ISS  - Monitor and adjust insulin based on FS     SKIN  - R FEMORAL (5/1-5/2)   - R IJ SHILEY (5/3-5/10)   - R IJ SHILEY (5/13 - 5/27)   - R IJ SHILEY (6/1 - 6/6)     ETHICS/ GOC    - FULL CODE     DISPO - PMR recc ACUTE

## 2024-06-12 NOTE — PROGRESS NOTE ADULT - SUBJECTIVE AND OBJECTIVE BOX
Cardiovascular Disease Progress Note  DATE OF SERVICE: 24 @ 07:37    Overnight events: No acute events overnight.   The patient is undergoing HD in no distress at the bedside.    Otherwise review of systems negative    Objective Findings:  T(C): 36.7 (24 @ 06:50), Max: 36.7 (24 @ 06:50)  HR: 60 (24 @ 06:50) (53 - 75)  BP: 143/63 (24 @ 06:50) (120/76 - 149/57)  RR: 20 (24 @ 06:50) (18 - 20)  SpO2: 100% (24 @ 06:50) (96% - 100%)  Wt(kg): --  Daily     Daily Weight in k.1 (2024 06:50)      Physical Exam:  Gen: NAD; Patient resting comfortably  HEENT: EOMI, Normocephalic/ atraumatic  CV: RRR, normal S1 + S2, no m/r/g  Lungs:  Normal respiratory effort; on trach collar  Abd: soft, non-tender; bowel sounds present  Ext: No edema; warm and well perfused    Telemetry: n/a    Laboratory Data:        136  |  95<L>  |  48<H>  ----------------------------<  158<H>  4.0   |  28  |  3.72<H>    Ca    8.5      2024 08:38  Phos  2.7       Mg     2.40                     Inpatient Medications:  MEDICATIONS  (STANDING):  albuterol    0.083% 2.5 milliGRAM(s) Nebulizer every 6 hours  amLODIPine   Tablet 10 milliGRAM(s) Oral daily  apixaban 5 milliGRAM(s) Enteral Tube every 12 hours  aspirin  chewable 81 milliGRAM(s) Oral daily  atorvastatin 20 milliGRAM(s) Oral at bedtime  carvedilol 25 milliGRAM(s) Oral every 12 hours  chlorhexidine 0.12% Liquid 15 milliLiter(s) Oral Mucosa two times a day  chlorhexidine 2% Cloths 1 Application(s) Topical <User Schedule>  ciprofloxacin   IVPB      ciprofloxacin   IVPB 400 milliGRAM(s) IV Intermittent every 24 hours  dextrose 10% Bolus 125 milliLiter(s) IV Bolus once  dextrose 5%. 1000 milliLiter(s) (100 mL/Hr) IV Continuous <Continuous>  dextrose 5%. 1000 milliLiter(s) (50 mL/Hr) IV Continuous <Continuous>  dextrose 50% Injectable 12.5 Gram(s) IV Push once  dextrose 50% Injectable 25 Gram(s) IV Push once  epoetin reilly (EPOGEN) Injectable 49567 Unit(s) IV Push <User Schedule>  ferrous    sulfate Liquid 300 milliGRAM(s) Enteral Tube daily  glucagon  Injectable 1 milliGRAM(s) IntraMuscular once  hydrALAZINE 100 milliGRAM(s) Oral three times a day  insulin lispro (ADMELOG) corrective regimen sliding scale   SubCutaneous every 6 hours  insulin NPH human recombinant 4 Unit(s) SubCutaneous every 6 hours  lidocaine   4% Patch 2 Patch Transdermal every 24 hours  pantoprazole  Injectable 40 milliGRAM(s) IV Push every 12 hours  polyethylene glycol 3350 17 Gram(s) Oral daily  senna 2 Tablet(s) Oral at bedtime  sodium chloride 0.9% for Nebulization 3 milliLiter(s) Nebulizer every 6 hours      Assessment: 73 year old man with HTN, HLD, T2DM on insulin, and ESRD on HD presents with supply demand ischemia and angina.    Plan of Care:    #Type II myocardial infarction-  Secondary to distributive shock earlier on admission.   The repeat echo shows no new wall motion abnormality.   I would not pursue cath at this time given debility and chronic respiratory failure s/p trach .       #HTN-  BP acceptable on current regimen.        #ESRD-  HD as per renal     #DVT   - R common Iliac DVT  AC as per RCU.              Over 55 minutes spent on total encounter; more than 50% of the visit was spent counseling and/or coordinating care by the attending physician.      Geovani Bravo MD [facility]  Cardiovascular Disease  [phone]

## 2024-06-12 NOTE — PROGRESS NOTE ADULT - ASSESSMENT
73-year-old male past medical history hypertension, ESRD on dialysis Monday Wednesday Friday, diabetes insulin-dependent presents with hiccups patient endorses persistent hiccups for the last 2 days. Nephrology consulted for HD needs.    A/P  ESRD:  Center: [facility]  Nephrologist: Dr. Hardwick  Access: R AVF nonfunction now s/p thrombectomy 6/4.  HD via AVF working well 6/5;  shiley removed 6/6  last hd 6/10 tolerated well uf 2L  hd today  Continue MWF  Consent obtained and placed in ED chart  Renal diet  Monitor BMP - no labs today.  Consider [facility] for rehab where his outpatient Nephrologist, Dr. Hardwick can follow him.    Hyperkalemia/Hypokalemia  Improved w/ HD.  C/W HD schedule as above.  Lokelma 10gm PRN for K >5.3 on non-HD days  Low K diet.  Monitor     HTN:  BP  controlled.  On carvedilol 12.5mg BID, hydralazine 100mg TID.  On norvasc -- > down titrate if BP remains marginal / controlled.  UF w/ HD as BP permits.  Monitor BP.    Anemia:  Hgb stable  + iron deficiency.  Tsat 13% - on ferrous sulfate 300mg qd via PEG.  Venofer held d/t leukocytosis.  SILVA w/ HD.  Transfuse for Hgb <7.  Monitor Hb.    CKD-MBD   - low for CKD.  PO4 low; repleted w/ PhosNaK on 6/8.  Supplement PO4 as needed.  Sevelamer 1600mg TID d/c'ed 5/21.  Monitor Ca, PO4 daily    Hypocalcemia:  In setting of CKD vs. hypoalbuminemia.  Optimize albumin.  Corrected Ca WNL.  Replete as needed.  Monitor Ca.    Hyponatremia  in setting of esrd   UF w/ HD.  Free fluid restriction to 1L/day.  monitor

## 2024-06-12 NOTE — CHART NOTE - NSCHARTNOTESELECT_GEN_ALL_CORE
Central Line and Arterial Line Removal/Event Note
Event Note
IR Pre Procedure Note/Event Note
Interventional Radiology/Event Note
MICU Downgrade/Event Note
Nutrition Services
Nutrition Services
Preliminary EEG report
Vascular Surgery
Event Note
Follow Up/Nutrition Services
Interventional Radiology/Event Note
MICU Acceptance/Event Note
MICU Ultrasound
Medicine ACP/Event Note
Neck Ultrasound
Nutrition Services
Nutrition Services
POCUS
POCUS/Event Note
RCU Acceptance Note/Event Note
RRT/Event Note
Trach sutures/Event Note
Vascular Surgery
Vascular Surgery Post Op Check
s/p RRT/Event Note

## 2024-06-12 NOTE — CHART NOTE - NSCHARTNOTEFT_GEN_A_CORE
Pt seen for SEVERE MALNUTRITION FOLLOW UP     Medical Course:  72 y/o male PMHx HTN, ESRD, IDDM p/w hiccups and started on baclofen with concern for AMS overnight and desaturation, ?cheye nascimento respirations. Labs with numerous metabolic derangements. CTH with bifrontal encephalomalacia, likley traumatic.     Nutrition Course:   Nutrition interview: No recent episodes of nausea, vomiting, diarrhea, or constipation. Last BM noted 6/11 per RN flowsheets. Pt noted with swallow evaluation completed 6/11, recommended continue NPO- alternate means of nutrition and hydration.     Pt remains NPO TF only as sole source of nutrition and hydration; Nepro @ 45ml/hr x24hrs (52kg) (1080ml total vol, 1944 kcal (37kcal/kg), 87 gm protein (1.7gm/kg), 788ml free water from formula). No TF intolerances noted per RN today. Continue to receive Banatrol 1x/day.       Diet Prescription: Diet, NPO with Tube Feed:   Tube Feeding Modality: Gastrostomy  Nepro with Carb Steady (NEPRORTH)  Continuous  Starting Tube Feed Rate {mL per Hour}: 10  Increase Tube Feed Rate by (mL): 10     Every 6 hours  Until Goal Tube Feed Rate (mL per Hour): 45  Banatrol TF     Qty per Day:  1 (05-28-24 @ 11:39)    Pertinent Medications: MEDICATIONS  (STANDING):  albuterol    0.083% 2.5 milliGRAM(s) Nebulizer every 6 hours  amLODIPine   Tablet 10 milliGRAM(s) Oral daily  apixaban 5 milliGRAM(s) Enteral Tube every 12 hours  aspirin  chewable 81 milliGRAM(s) Oral daily  atorvastatin 20 milliGRAM(s) Oral at bedtime  carvedilol 25 milliGRAM(s) Oral every 12 hours  chlorhexidine 0.12% Liquid 15 milliLiter(s) Oral Mucosa two times a day  chlorhexidine 2% Cloths 1 Application(s) Topical <User Schedule>  dextrose 10% Bolus 125 milliLiter(s) IV Bolus once  dextrose 5%. 1000 milliLiter(s) (50 mL/Hr) IV Continuous <Continuous>  dextrose 5%. 1000 milliLiter(s) (100 mL/Hr) IV Continuous <Continuous>  dextrose 50% Injectable 12.5 Gram(s) IV Push once  dextrose 50% Injectable 25 Gram(s) IV Push once  epoetin reilly (EPOGEN) Injectable 74561 Unit(s) IV Push <User Schedule>  ferrous    sulfate Liquid 300 milliGRAM(s) Enteral Tube daily  glucagon  Injectable 1 milliGRAM(s) IntraMuscular once  hydrALAZINE 100 milliGRAM(s) Oral three times a day  insulin lispro (ADMELOG) corrective regimen sliding scale   SubCutaneous every 6 hours  insulin NPH human recombinant 4 Unit(s) SubCutaneous every 6 hours  lidocaine   4% Patch 2 Patch Transdermal every 24 hours  pantoprazole  Injectable 40 milliGRAM(s) IV Push every 12 hours  polyethylene glycol 3350 17 Gram(s) Oral daily  senna 2 Tablet(s) Oral at bedtime    MEDICATIONS  (PRN):  acetaminophen   Oral Liquid .. 650 milliGRAM(s) Oral every 6 hours PRN Temp greater or equal to 38C (100.4F), Moderate Pain (4 - 6)  dextrose Oral Gel 15 Gram(s) Oral once PRN Blood Glucose LESS THAN 70 milliGRAM(s)/deciliter  hydrALAZINE Injectable 10 milliGRAM(s) IV Push every 8 hours PRN SBP > 180  sodium chloride 0.9% lock flush 10 milliLiter(s) IV Push every 1 hour PRN Pre/post blood products, medications, blood draw, and to maintain line patency    Pertinent Labs: 06-11 Na136 mmol/L Glu 158 mg/dL<H> K+ 4.0 mmol/L Cr  3.72 mg/dL<H> BUN 48 mg/dL<H> 06-11 Phos 2.7 mg/dL 05-17 Chol 86 mg/dL LDL --    HDL 27 mg/dL<L> Trig 151 mg/dL<H>    POCT Blood Glucose.: 121 mg/dL (12 Jun 2024 11:08)  POCT Blood Glucose.: 165 mg/dL (12 Jun 2024 06:13)  POCT Blood Glucose.: 166 mg/dL (12 Jun 2024 00:03)  POCT Blood Glucose.: 167 mg/dL (11 Jun 2024 17:12)      Weight: Height (cm): 172.7 (06-04 @ 17:09)  Weight (kg): 54 (06-09 @ 00:00)  BMI (kg/m2): 18.1 (06-09 @ 00:00)  Weight Assessment: No new weight to assess      Physical Assessment, per flowsheets:  Edema: None noted   Skin: intact     Estimated Needs:   [X] No change since previous assessment    Previous Nutrition Diagnosis: [ x] Malnutrition   Nutrition Diagnosis is [ x] ongoing  [ ] resolved [ ] not applicable   New Nutrition Diagnosis: [ x] not applicable     Education:  [ x ] Not applicable 2/2 cognitive deficit      Interventions:   1) Continue on current TF regimen: Nepro @ 45ml/hr x24hrs  2) Continue on banatrol 1x/day to aide in gut health and normal BMs  3) Continue on ferrous sulfate for mineral provisions  4) RD to f/u prn       Monitor & Evaluate:  Tolerance to diet/supplement, nutrition related lab values, weight trends, BMs/GI distress, hydration status, skin integrity.    Emerald Krishnan MS, RDN (Pager #[phone]) | Also available on TEAMS

## 2024-06-12 NOTE — PROGRESS NOTE ADULT - SUBJECTIVE AND OBJECTIVE BOX
Patient is a 73y Male     Patient is a 73y old  Male who presents with a chief complaint of Unstable angina, abn EKG (11 Jun 2024 21:41)      HPI:  73-year-old male past medical history hypertension, ESRD on dialysis Monday Wednesday Friday, diabetes insulin-dependent presents with hiccups patient endorses persistent hiccups for the last 2 days. found to have peaked T waves, a new finding. denies dizziness, N/V, denies CP, palps, abd pain (29 Apr 2024 21:15)      PAST MEDICAL & SURGICAL HISTORY:  Diabetes      Benign essential HTN      HLD (hyperlipidemia)      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      ESRD on hemodialysis      Arteriovenous fistula          MEDICATIONS  (STANDING):  albuterol    0.083% 2.5 milliGRAM(s) Nebulizer every 6 hours  amLODIPine   Tablet 10 milliGRAM(s) Oral daily  apixaban 5 milliGRAM(s) Enteral Tube every 12 hours  aspirin  chewable 81 milliGRAM(s) Oral daily  atorvastatin 20 milliGRAM(s) Oral at bedtime  carvedilol 25 milliGRAM(s) Oral every 12 hours  chlorhexidine 0.12% Liquid 15 milliLiter(s) Oral Mucosa two times a day  chlorhexidine 2% Cloths 1 Application(s) Topical <User Schedule>  ciprofloxacin   IVPB      ciprofloxacin   IVPB 400 milliGRAM(s) IV Intermittent every 24 hours  dextrose 10% Bolus 125 milliLiter(s) IV Bolus once  dextrose 5%. 1000 milliLiter(s) (50 mL/Hr) IV Continuous <Continuous>  dextrose 5%. 1000 milliLiter(s) (100 mL/Hr) IV Continuous <Continuous>  dextrose 50% Injectable 12.5 Gram(s) IV Push once  dextrose 50% Injectable 25 Gram(s) IV Push once  epoetin reilly (EPOGEN) Injectable 25100 Unit(s) IV Push <User Schedule>  ferrous    sulfate Liquid 300 milliGRAM(s) Enteral Tube daily  glucagon  Injectable 1 milliGRAM(s) IntraMuscular once  hydrALAZINE 100 milliGRAM(s) Oral three times a day  insulin lispro (ADMELOG) corrective regimen sliding scale   SubCutaneous every 6 hours  insulin NPH human recombinant 4 Unit(s) SubCutaneous every 6 hours  lidocaine   4% Patch 2 Patch Transdermal every 24 hours  pantoprazole  Injectable 40 milliGRAM(s) IV Push every 12 hours  polyethylene glycol 3350 17 Gram(s) Oral daily  senna 2 Tablet(s) Oral at bedtime  sodium chloride 0.9% for Nebulization 3 milliLiter(s) Nebulizer every 6 hours      Allergies    No Known Allergies    Intolerances        SOCIAL HISTORY:  Denies ETOh,Smoking,     FAMILY HISTORY:  FHx: diabetes mellitus (Father, Aunt)        REVIEW OF SYSTEMS:    CONSTITUTIONAL: No weakness, fevers or chills  EYES/ENT: No visual changes;  No vertigo or throat pain   NECK: No pain or stiffness  RESPIRATORY: No cough, wheezing, hemoptysis; No shortness of breath  CARDIOVASCULAR: No chest pain or palpitations  GASTROINTESTINAL: No abdominal or epigastric pain. No nausea, vomiting, or hematemesis; No diarrhea or constipation. No melena or hematochezia.  GENITOURINARY: No dysuria, frequency or hematuria  NEUROLOGICAL: No numbness or weakness  SKIN: No itching, burning, rashes, or lesions   All other review of systems is negative unless indicated above.    VITAL:  T(C): , Max: 36.6 (06-11-24 @ 18:00)  T(F): , Max: 97.8 (06-11-24 @ 18:00)  HR: 70 (06-12-24 @ 03:47)  BP: 145/68 (06-12-24 @ 00:00)  BP(mean): 88 (06-12-24 @ 00:00)  RR: 20 (06-12-24 @ 00:00)  SpO2: 98% (06-12-24 @ 03:47)  Wt(kg): --    I and O's:    06-09 @ 07:01  -  06-10 @ 07:00  --------------------------------------------------------  IN: 1180 mL / OUT: 1 mL / NET: 1179 mL    06-10 @ 07:01  -  06-11 @ 07:00  --------------------------------------------------------  IN: 1145 mL / OUT: 2400 mL / NET: -1255 mL          PHYSICAL EXAM:    Constitutional: NAD  HEENT: PERRLA,   Neck: No JVD  Respiratory: CTA B/L  Cardiovascular: S1 and S2  Gastrointestinal: BS+, soft, NT/ND  Extremities: No peripheral edema  Neurological: A/O x 3, no focal deficits  Psychiatric: Normal mood, normal affect  : No Abbasi  Skin: No rashes  Access: Not applicable  Back: No CVA tenderness    LABS:    06-11    136  |  95<L>  |  48<H>  ----------------------------<  158<H>  4.0   |  28  |  3.72<H>    Ca    8.5      11 Jun 2024 08:38  Phos  2.7     06-11  Mg     2.40     06-11            RADIOLOGY & ADDITIONAL STUDIES:

## 2024-06-12 NOTE — PROGRESS NOTE ADULT - SUBJECTIVE AND OBJECTIVE BOX
[facility]   MD ELIANE BHAGAT MD ANGELA WONG, PA    TEL:  OFFICE: [phone]  From 5pm-7am Answering Service [phone]    -- RENAL FOLLOW UP NOTE ---Date of Service 06-12-24 @ 09:27    Patient is a 73y old  Male who presents with a chief complaint of Unstable angina, abn EKG (12 Jun 2024 08:01)      Patient seen and examined at bedside. No chest pain/sob    VITALS:  T(F): 98 (06-12-24 @ 06:50), Max: 98 (06-12-24 @ 06:50)  HR: 60 (06-12-24 @ 06:50)  BP: 143/63 (06-12-24 @ 06:50)  RR: 16 (06-12-24 @ 08:26)  SpO2: 99% (06-12-24 @ 08:26)  Wt(kg): --        PHYSICAL EXAM:  General: NAD  Neck: +trach  Respiratory: CTAB, no wheezes, rales or rhonchi  Cardiovascular: S1, S2, RRR  Gastrointestinal: +peg  Extremities: No peripheral edema    Hospital Medications:   MEDICATIONS  (STANDING):  albuterol    0.083% 2.5 milliGRAM(s) Nebulizer every 6 hours  amLODIPine   Tablet 10 milliGRAM(s) Oral daily  apixaban 5 milliGRAM(s) Enteral Tube every 12 hours  aspirin  chewable 81 milliGRAM(s) Oral daily  atorvastatin 20 milliGRAM(s) Oral at bedtime  carvedilol 25 milliGRAM(s) Oral every 12 hours  chlorhexidine 0.12% Liquid 15 milliLiter(s) Oral Mucosa two times a day  chlorhexidine 2% Cloths 1 Application(s) Topical <User Schedule>  ciprofloxacin   IVPB      ciprofloxacin   IVPB 400 milliGRAM(s) IV Intermittent every 24 hours  dextrose 10% Bolus 125 milliLiter(s) IV Bolus once  dextrose 5%. 1000 milliLiter(s) (50 mL/Hr) IV Continuous <Continuous>  dextrose 5%. 1000 milliLiter(s) (100 mL/Hr) IV Continuous <Continuous>  dextrose 50% Injectable 12.5 Gram(s) IV Push once  dextrose 50% Injectable 25 Gram(s) IV Push once  epoetin reilly (EPOGEN) Injectable 60570 Unit(s) IV Push <User Schedule>  ferrous    sulfate Liquid 300 milliGRAM(s) Enteral Tube daily  glucagon  Injectable 1 milliGRAM(s) IntraMuscular once  hydrALAZINE 100 milliGRAM(s) Oral three times a day  insulin lispro (ADMELOG) corrective regimen sliding scale   SubCutaneous every 6 hours  insulin NPH human recombinant 4 Unit(s) SubCutaneous every 6 hours  lidocaine   4% Patch 2 Patch Transdermal every 24 hours  pantoprazole  Injectable 40 milliGRAM(s) IV Push every 12 hours  polyethylene glycol 3350 17 Gram(s) Oral daily  senna 2 Tablet(s) Oral at bedtime  sodium chloride 0.9% for Nebulization 3 milliLiter(s) Nebulizer every 6 hours      LABS:  06-11    136  |  95<L>  |  48<H>  ----------------------------<  158<H>  4.0   |  28  |  3.72<H>    Ca    8.5      11 Jun 2024 08:38  Phos  2.7     06-11  Mg     2.40     06-11      Creatinine Trend: 3.72 <--, 3.60 <--, 5.16 <--, 2.77 <--, 4.72 <--, 3.55 <--, 4.69 <--            Urine Studies:  Urinalysis - [06-11-24 @ 08:38]      Color  / Appearance  / SG  / pH       Gluc 158 / Ketone   / Bili  / Urobili        Blood  / Protein  / Leuk Est  / Nitrite       RBC  / WBC  / Hyaline  / Gran  / Sq Epi  / Non Sq Epi  / Bacteria       Iron 16, TIBC 121, %sat 13      [05-17-24 @ 06:00]  Ferritin 720      [05-17-24 @ 06:00]  PTH -- (Ca --)      [05-26-24 @ 01:20]   122  TSH 2.60      [05-17-24 @ 06:00]  Lipid: chol 86, , HDL 27, LDL --      [05-17-24 @ 06:00]    HBsAb <3.0      [06-03-24 @ 16:20]  HBsAg Nonreact      [06-03-24 @ 16:20]  HBcAb Nonreact      [06-03-24 @ 16:20]  HCV 0.09, Nonreact      [06-03-24 @ 16:20]      RADIOLOGY & ADDITIONAL STUDIES:

## 2024-06-12 NOTE — PROGRESS NOTE ADULT - SUBJECTIVE AND OBJECTIVE BOX
CHIEF COMPLAINT: Patient is a 73y old  Male who presents with a chief complaint of Unstable angina, abn EKG (12 Jun 2024 07:35)      INTERVAL EVENTS:   - No acute overnight events.  - Tolerating TC around the clock     ROS: Seen by bedside during AM rounds     OBJECTIVE:  ICU Vital Signs Last 24 Hrs  T(C): 36.7 (12 Jun 2024 06:50), Max: 36.7 (12 Jun 2024 06:50)  T(F): 98 (12 Jun 2024 06:50), Max: 98 (12 Jun 2024 06:50)  HR: 60 (12 Jun 2024 06:50) (53 - 75)  BP: 143/63 (12 Jun 2024 06:50) (120/76 - 149/57)  BP(mean): 52 (12 Jun 2024 06:50) (52 - 88)  ABP: --  ABP(mean): --  RR: 20 (12 Jun 2024 06:50) (18 - 20)  SpO2: 100% (12 Jun 2024 06:50) (96% - 100%)    O2 Parameters below as of 12 Jun 2024 06:50  Patient On (Oxygen Delivery Method): tracheostomy collar              CAPILLARY BLOOD GLUCOSE      POCT Blood Glucose.: 165 mg/dL (12 Jun 2024 06:13)      PHYSICAL EXAM:  General:   HEENT:   Lymph Nodes:  Neck:   Respiratory:   Cardiovascular:   Abdomen:   Extremities:   Skin:   Neurological:  Psychiatry:        HOSPITAL MEDICATIONS:  MEDICATIONS  (STANDING):  albuterol    0.083% 2.5 milliGRAM(s) Nebulizer every 6 hours  amLODIPine   Tablet 10 milliGRAM(s) Oral daily  apixaban 5 milliGRAM(s) Enteral Tube every 12 hours  aspirin  chewable 81 milliGRAM(s) Oral daily  atorvastatin 20 milliGRAM(s) Oral at bedtime  carvedilol 25 milliGRAM(s) Oral every 12 hours  chlorhexidine 0.12% Liquid 15 milliLiter(s) Oral Mucosa two times a day  chlorhexidine 2% Cloths 1 Application(s) Topical <User Schedule>  ciprofloxacin   IVPB      ciprofloxacin   IVPB 400 milliGRAM(s) IV Intermittent every 24 hours  dextrose 10% Bolus 125 milliLiter(s) IV Bolus once  dextrose 5%. 1000 milliLiter(s) (50 mL/Hr) IV Continuous <Continuous>  dextrose 5%. 1000 milliLiter(s) (100 mL/Hr) IV Continuous <Continuous>  dextrose 50% Injectable 12.5 Gram(s) IV Push once  dextrose 50% Injectable 25 Gram(s) IV Push once  epoetin reilly (EPOGEN) Injectable 44447 Unit(s) IV Push <User Schedule>  ferrous    sulfate Liquid 300 milliGRAM(s) Enteral Tube daily  glucagon  Injectable 1 milliGRAM(s) IntraMuscular once  hydrALAZINE 100 milliGRAM(s) Oral three times a day  insulin lispro (ADMELOG) corrective regimen sliding scale   SubCutaneous every 6 hours  insulin NPH human recombinant 4 Unit(s) SubCutaneous every 6 hours  lidocaine   4% Patch 2 Patch Transdermal every 24 hours  pantoprazole  Injectable 40 milliGRAM(s) IV Push every 12 hours  polyethylene glycol 3350 17 Gram(s) Oral daily  senna 2 Tablet(s) Oral at bedtime  sodium chloride 0.9% for Nebulization 3 milliLiter(s) Nebulizer every 6 hours    MEDICATIONS  (PRN):  acetaminophen   Oral Liquid .. 650 milliGRAM(s) Oral every 6 hours PRN Temp greater or equal to 38C (100.4F), Moderate Pain (4 - 6)  dextrose Oral Gel 15 Gram(s) Oral once PRN Blood Glucose LESS THAN 70 milliGRAM(s)/deciliter  hydrALAZINE Injectable 10 milliGRAM(s) IV Push every 8 hours PRN SBP > 180  sodium chloride 0.9% lock flush 10 milliLiter(s) IV Push every 1 hour PRN Pre/post blood products, medications, blood draw, and to maintain line patency      LABS:    06-11    136  |  95<L>  |  48<H>  ----------------------------<  158<H>  4.0   |  28  |  3.72<H>    Ca    8.5      11 Jun 2024 08:38  Phos  2.7     06-11  Mg     2.40     06-11        Urinalysis Basic - ( 11 Jun 2024 08:38 )    Color: x / Appearance: x / SG: x / pH: x  Gluc: 158 mg/dL / Ketone: x  / Bili: x / Urobili: x   Blood: x / Protein: x / Nitrite: x   Leuk Esterase: x / RBC: x / WBC x   Sq Epi: x / Non Sq Epi: x / Bacteria: x         CHIEF COMPLAINT: Patient is a 73y old  Male who presents with a chief complaint of Unstable angina, abn EKG (12 Jun 2024 07:35)      INTERVAL EVENTS:   - No acute overnight events.  - Tolerating TC around the clock   - failed subsequent speech and swallow yesterday     ROS: Seen by bedside during AM rounds, denies cp or sob     OBJECTIVE:  ICU Vital Signs Last 24 Hrs  T(C): 36.7 (12 Jun 2024 06:50), Max: 36.7 (12 Jun 2024 06:50)  T(F): 98 (12 Jun 2024 06:50), Max: 98 (12 Jun 2024 06:50)  HR: 60 (12 Jun 2024 06:50) (53 - 75)  BP: 143/63 (12 Jun 2024 06:50) (120/76 - 149/57)  BP(mean): 52 (12 Jun 2024 06:50) (52 - 88)  ABP: --  ABP(mean): --  RR: 20 (12 Jun 2024 06:50) (18 - 20)  SpO2: 100% (12 Jun 2024 06:50) (96% - 100%)    O2 Parameters below as of 12 Jun 2024 06:50  Patient On (Oxygen Delivery Method): tracheostomy collar              CAPILLARY BLOOD GLUCOSE      POCT Blood Glucose.: 165 mg/dL (12 Jun 2024 06:13)      PHYSICAL EXAM:  General: NAD  Neck: TC, site is clean dry and intact, no JVD  Respiratory: clear to ausculation b/l, no rales, rhonchi or wheezing  Cardiovascular: normal s1, s2, regular rate and rhythm  Abdomen: soft, non tender, +PEG tube  Extremities: no pedal edema  Skin: warm, dry  Neurological: AAO x 4, awake and alert, follows commands, moves extremities freely  Psychiatry: normal affect, normal behavior         HOSPITAL MEDICATIONS:  MEDICATIONS  (STANDING):  albuterol    0.083% 2.5 milliGRAM(s) Nebulizer every 6 hours  amLODIPine   Tablet 10 milliGRAM(s) Oral daily  apixaban 5 milliGRAM(s) Enteral Tube every 12 hours  aspirin  chewable 81 milliGRAM(s) Oral daily  atorvastatin 20 milliGRAM(s) Oral at bedtime  carvedilol 25 milliGRAM(s) Oral every 12 hours  chlorhexidine 0.12% Liquid 15 milliLiter(s) Oral Mucosa two times a day  chlorhexidine 2% Cloths 1 Application(s) Topical <User Schedule>  ciprofloxacin   IVPB      ciprofloxacin   IVPB 400 milliGRAM(s) IV Intermittent every 24 hours  dextrose 10% Bolus 125 milliLiter(s) IV Bolus once  dextrose 5%. 1000 milliLiter(s) (50 mL/Hr) IV Continuous <Continuous>  dextrose 5%. 1000 milliLiter(s) (100 mL/Hr) IV Continuous <Continuous>  dextrose 50% Injectable 12.5 Gram(s) IV Push once  dextrose 50% Injectable 25 Gram(s) IV Push once  epoetin reilly (EPOGEN) Injectable 38838 Unit(s) IV Push <User Schedule>  ferrous    sulfate Liquid 300 milliGRAM(s) Enteral Tube daily  glucagon  Injectable 1 milliGRAM(s) IntraMuscular once  hydrALAZINE 100 milliGRAM(s) Oral three times a day  insulin lispro (ADMELOG) corrective regimen sliding scale   SubCutaneous every 6 hours  insulin NPH human recombinant 4 Unit(s) SubCutaneous every 6 hours  lidocaine   4% Patch 2 Patch Transdermal every 24 hours  pantoprazole  Injectable 40 milliGRAM(s) IV Push every 12 hours  polyethylene glycol 3350 17 Gram(s) Oral daily  senna 2 Tablet(s) Oral at bedtime  sodium chloride 0.9% for Nebulization 3 milliLiter(s) Nebulizer every 6 hours    MEDICATIONS  (PRN):  acetaminophen   Oral Liquid .. 650 milliGRAM(s) Oral every 6 hours PRN Temp greater or equal to 38C (100.4F), Moderate Pain (4 - 6)  dextrose Oral Gel 15 Gram(s) Oral once PRN Blood Glucose LESS THAN 70 milliGRAM(s)/deciliter  hydrALAZINE Injectable 10 milliGRAM(s) IV Push every 8 hours PRN SBP > 180  sodium chloride 0.9% lock flush 10 milliLiter(s) IV Push every 1 hour PRN Pre/post blood products, medications, blood draw, and to maintain line patency      LABS:    06-11    136  |  95<L>  |  48<H>  ----------------------------<  158<H>  4.0   |  28  |  3.72<H>    Ca    8.5      11 Jun 2024 08:38  Phos  2.7     06-11  Mg     2.40     06-11        Urinalysis Basic - ( 11 Jun 2024 08:38 )    Color: x / Appearance: x / SG: x / pH: x  Gluc: 158 mg/dL / Ketone: x  / Bili: x / Urobili: x   Blood: x / Protein: x / Nitrite: x   Leuk Esterase: x / RBC: x / WBC x   Sq Epi: x / Non Sq Epi: x / Bacteria: x

## 2024-06-12 NOTE — PROGRESS NOTE ADULT - NS ATTEND AMEND GEN_ALL_CORE FT
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 Now transferred to RCU 5/16 for further management.     Pt initially with acute encephalopathy concerning for baclofen toxicity followed by MRI Head (5/6) found to have left talya and left cerebellum ischemic infarct with no vessel occlusion/stenosis on MRA. Pt now clinically improved, encephalopathy resolving. Is able to communicate spontaneously but remains physically weakened from CVA and prolonged hospitalization with critical illness. PM&R consulted, dispo to acute rehab. Continue on NOAC and statin. Neurology recs appreciated. PT/OT following.     Pt with acute combined hypoxemic and hypercapnic respiratory failure s/p ETT intubation/MV with failure to wean from ventilator requiring tracheostomy placement (IP 5/14). Pt now weaned to TC ATC (5/30) with PMV with independent use. ABG on current settings wnl. Trach care and suctioning as per RCU team. Oral hygiene and aspiration precautions in place. Wean O2 supplementation for goal O2 saturation 90-95%. Airway clearance therapy in place.     Pt initially admitted with demand ischemia versus NSTEMI. EKG concerning for ST deviation/t-wave peak with (+) troponin leak. TTE (4/30) without regional wall abnormalities. No indication for catheterization at this time. Cardiology following and recs appreciated. Pt remains HD stable and clinically euvolemic. No events on telemetry. Cardiology following, recs appreciated.     Pt with oropharyngeal dysphagia s/p PEG placement (Surgery, 5/17). Pt tolerating feeds at goal rate. SLP evaluated today, pt did not pass green dye test. Pt with concern for melena on 5/26 with blood loss anemia, now resolved. GI consulted, but not a candidate for endoscopy given high surgical risk. Will CTM carefully. PPI BID continued. CBC q24hr.    Pt with ESRD via right AVF found to have stenosis requiring temporary access. Right Shiley removed (5/10) 2/2 bacteremia, replaced 5/13 and then a gain 5/27 2/2 recurrent fevers. Fevers resolved, pt with new Shiley 6/1. Pt s/p RUE fistuloplasty (6/4) now tolerating HD via AVF. HD as per nephrology team, scheduled for tomorrow.     Hospital course further c/b aspiration PNA c/b B. cereus bacteremia (5/10) with clearance of cx 5/12, s/p completion of Abx with Zosyn through 5/27 and Vancomycin through 5/21. Most recently pt with recurrent thick secretions now completing course of ciprofloxacin (6/7-6/11.)     Hospital course further c/b RLE DVT (5/12/24) now on NOAC since 6/6. Tolerating well. Will c/w current therapy x3-6 months.    Dispo pending medical optimization. Pt full code. DVT ppx full A/C with NOAC. Discussed with pt and wife at bedside today, will continue to update throughout hospitalization. Plan for discharge to acute rehab with HD.

## 2024-06-12 NOTE — PROGRESS NOTE ADULT - SUBJECTIVE AND OBJECTIVE BOX
Patient is a 73y old  Male who presents with a chief complaint of Unstable angina, abn EKG (12 Jun 2024 09:24)      SUBJECTIVE / OVERNIGHT EVENTS: failed swallow eval    MEDICATIONS  (STANDING):  albuterol    0.083% 2.5 milliGRAM(s) Nebulizer every 6 hours  amLODIPine   Tablet 10 milliGRAM(s) Oral daily  apixaban 5 milliGRAM(s) Enteral Tube every 12 hours  aspirin  chewable 81 milliGRAM(s) Oral daily  atorvastatin 20 milliGRAM(s) Oral at bedtime  carvedilol 25 milliGRAM(s) Oral every 12 hours  chlorhexidine 0.12% Liquid 15 milliLiter(s) Oral Mucosa two times a day  chlorhexidine 2% Cloths 1 Application(s) Topical <User Schedule>  dextrose 10% Bolus 125 milliLiter(s) IV Bolus once  dextrose 5%. 1000 milliLiter(s) (50 mL/Hr) IV Continuous <Continuous>  dextrose 5%. 1000 milliLiter(s) (100 mL/Hr) IV Continuous <Continuous>  dextrose 50% Injectable 12.5 Gram(s) IV Push once  dextrose 50% Injectable 25 Gram(s) IV Push once  epoetin reilly (EPOGEN) Injectable 51788 Unit(s) IV Push <User Schedule>  ferrous    sulfate Liquid 300 milliGRAM(s) Enteral Tube daily  glucagon  Injectable 1 milliGRAM(s) IntraMuscular once  hydrALAZINE 100 milliGRAM(s) Oral three times a day  insulin lispro (ADMELOG) corrective regimen sliding scale   SubCutaneous every 6 hours  insulin NPH human recombinant 4 Unit(s) SubCutaneous every 6 hours  lidocaine   4% Patch 2 Patch Transdermal every 24 hours  pantoprazole  Injectable 40 milliGRAM(s) IV Push every 12 hours  polyethylene glycol 3350 17 Gram(s) Oral daily  senna 2 Tablet(s) Oral at bedtime    MEDICATIONS  (PRN):  acetaminophen   Oral Liquid .. 650 milliGRAM(s) Oral every 6 hours PRN Temp greater or equal to 38C (100.4F), Moderate Pain (4 - 6)  dextrose Oral Gel 15 Gram(s) Oral once PRN Blood Glucose LESS THAN 70 milliGRAM(s)/deciliter  hydrALAZINE Injectable 10 milliGRAM(s) IV Push every 8 hours PRN SBP > 180  sodium chloride 0.9% lock flush 10 milliLiter(s) IV Push every 1 hour PRN Pre/post blood products, medications, blood draw, and to maintain line patency      Vital Signs Last 24 Hrs  T(F): 98.1 (06-12-24 @ 19:45), Max: 98.1 (06-12-24 @ 19:45)  HR: 56 (06-12-24 @ 21:42) (53 - 72)  BP: 149/58 (06-12-24 @ 19:45) (116/55 - 154/61)  RR: 20 (06-12-24 @ 19:45) (16 - 20)  SpO2: 100% (06-12-24 @ 21:42) (96% - 100%)  Telemetry:   CAPILLARY BLOOD GLUCOSE      POCT Blood Glucose.: 169 mg/dL (12 Jun 2024 17:05)  POCT Blood Glucose.: 121 mg/dL (12 Jun 2024 11:08)  POCT Blood Glucose.: 165 mg/dL (12 Jun 2024 06:13)  POCT Blood Glucose.: 166 mg/dL (12 Jun 2024 00:03)    I&O's Summary    12 Jun 2024 07:01  -  12 Jun 2024 23:23  --------------------------------------------------------  IN: 1290 mL / OUT: 2400 mL / NET: -1110 mL        PHYSICAL EXAM:  GENERAL: NAD, well-developed  HEAD:  Atraumatic, Normocephalic  EYES: EOMI, PERRLA, conjunctiva and sclera clear  NECK: Supple, No JVD  CHEST/LUNG: Clear to auscultation bilaterally; No wheeze  HEART: Regular rate and rhythm; No murmurs, rubs, or gallops  ABDOMEN: Soft, Nontender, Nondistended; Bowel sounds present  EXTREMITIES:  2+ Peripheral Pulses, No clubbing, cyanosis, or edema  PSYCH: AAOx3  NEUROLOGY: non-focal  SKIN: No rashes or lesions    LABS:    06-11    136  |  95<L>  |  48<H>  ----------------------------<  158<H>  4.0   |  28  |  3.72<H>    Ca    8.5      11 Jun 2024 08:38  Phos  2.7     06-11  Mg     2.40     06-11            Urinalysis Basic - ( 11 Jun 2024 08:38 )    Color: x / Appearance: x / SG: x / pH: x  Gluc: 158 mg/dL / Ketone: x  / Bili: x / Urobili: x   Blood: x / Protein: x / Nitrite: x   Leuk Esterase: x / RBC: x / WBC x   Sq Epi: x / Non Sq Epi: x / Bacteria: x        RADIOLOGY & ADDITIONAL TESTS:    Imaging Personally Reviewed:    Consultant(s) Notes Reviewed:      Care Discussed with Consultants/Other Providers:

## 2024-06-13 LAB
ANION GAP SERPL CALC-SCNC: 12 MMOL/L — SIGNIFICANT CHANGE UP (ref 7–14)
BUN SERPL-MCNC: 46 MG/DL — HIGH (ref 7–23)
CALCIUM SERPL-MCNC: 8.7 MG/DL — SIGNIFICANT CHANGE UP (ref 8.4–10.5)
CHLORIDE SERPL-SCNC: 94 MMOL/L — LOW (ref 98–107)
CO2 SERPL-SCNC: 27 MMOL/L — SIGNIFICANT CHANGE UP (ref 22–31)
CREAT SERPL-MCNC: 3.51 MG/DL — HIGH (ref 0.5–1.3)
EGFR: 18 ML/MIN/1.73M2 — LOW
GLUCOSE BLDC GLUCOMTR-MCNC: 160 MG/DL — HIGH (ref 70–99)
GLUCOSE BLDC GLUCOMTR-MCNC: 168 MG/DL — HIGH (ref 70–99)
GLUCOSE BLDC GLUCOMTR-MCNC: 190 MG/DL — HIGH (ref 70–99)
GLUCOSE BLDC GLUCOMTR-MCNC: 194 MG/DL — HIGH (ref 70–99)
GLUCOSE BLDC GLUCOMTR-MCNC: 203 MG/DL — HIGH (ref 70–99)
GLUCOSE SERPL-MCNC: 183 MG/DL — HIGH (ref 70–99)
MAGNESIUM SERPL-MCNC: 2.4 MG/DL — SIGNIFICANT CHANGE UP (ref 1.6–2.6)
PHOSPHATE SERPL-MCNC: 2.7 MG/DL — SIGNIFICANT CHANGE UP (ref 2.5–4.5)
POTASSIUM SERPL-MCNC: 4.5 MMOL/L — SIGNIFICANT CHANGE UP (ref 3.5–5.3)
POTASSIUM SERPL-SCNC: 4.5 MMOL/L — SIGNIFICANT CHANGE UP (ref 3.5–5.3)
SODIUM SERPL-SCNC: 133 MMOL/L — LOW (ref 135–145)

## 2024-06-13 PROCEDURE — 99233 SBSQ HOSP IP/OBS HIGH 50: CPT

## 2024-06-13 PROCEDURE — 99232 SBSQ HOSP IP/OBS MODERATE 35: CPT

## 2024-06-13 RX ADMIN — ALBUTEROL 2.5 MILLIGRAM(S): 90 AEROSOL, METERED ORAL at 21:57

## 2024-06-13 RX ADMIN — APIXABAN 5 MILLIGRAM(S): 2.5 TABLET, FILM COATED ORAL at 05:26

## 2024-06-13 RX ADMIN — Medication 2: at 17:12

## 2024-06-13 RX ADMIN — PANTOPRAZOLE SODIUM 40 MILLIGRAM(S): 20 TABLET, DELAYED RELEASE ORAL at 17:11

## 2024-06-13 RX ADMIN — AMLODIPINE BESYLATE 10 MILLIGRAM(S): 2.5 TABLET ORAL at 05:26

## 2024-06-13 RX ADMIN — CHLORHEXIDINE GLUCONATE 15 MILLILITER(S): 213 SOLUTION TOPICAL at 17:10

## 2024-06-13 RX ADMIN — Medication 4: at 11:14

## 2024-06-13 RX ADMIN — Medication 2: at 00:43

## 2024-06-13 RX ADMIN — ALBUTEROL 2.5 MILLIGRAM(S): 90 AEROSOL, METERED ORAL at 03:45

## 2024-06-13 RX ADMIN — CHLORHEXIDINE GLUCONATE 1 APPLICATION(S): 213 SOLUTION TOPICAL at 05:26

## 2024-06-13 RX ADMIN — HUMAN INSULIN 4 UNIT(S): 100 INJECTION, SUSPENSION SUBCUTANEOUS at 00:43

## 2024-06-13 RX ADMIN — HUMAN INSULIN 4 UNIT(S): 100 INJECTION, SUSPENSION SUBCUTANEOUS at 17:13

## 2024-06-13 RX ADMIN — Medication 2: at 05:34

## 2024-06-13 RX ADMIN — Medication 100 MILLIGRAM(S): at 05:26

## 2024-06-13 RX ADMIN — Medication 300 MILLIGRAM(S): at 11:07

## 2024-06-13 RX ADMIN — Medication 100 MILLIGRAM(S): at 11:07

## 2024-06-13 RX ADMIN — HUMAN INSULIN 4 UNIT(S): 100 INJECTION, SUSPENSION SUBCUTANEOUS at 11:15

## 2024-06-13 RX ADMIN — CHLORHEXIDINE GLUCONATE 15 MILLILITER(S): 213 SOLUTION TOPICAL at 05:26

## 2024-06-13 RX ADMIN — Medication 100 MILLIGRAM(S): at 21:08

## 2024-06-13 RX ADMIN — SENNA PLUS 2 TABLET(S): 8.6 TABLET ORAL at 22:55

## 2024-06-13 RX ADMIN — CARVEDILOL PHOSPHATE 25 MILLIGRAM(S): 80 CAPSULE, EXTENDED RELEASE ORAL at 17:10

## 2024-06-13 RX ADMIN — APIXABAN 5 MILLIGRAM(S): 2.5 TABLET, FILM COATED ORAL at 17:10

## 2024-06-13 RX ADMIN — ALBUTEROL 2.5 MILLIGRAM(S): 90 AEROSOL, METERED ORAL at 16:09

## 2024-06-13 RX ADMIN — LIDOCAINE 2 PATCH: 4 CREAM TOPICAL at 03:00

## 2024-06-13 RX ADMIN — HUMAN INSULIN 4 UNIT(S): 100 INJECTION, SUSPENSION SUBCUTANEOUS at 05:35

## 2024-06-13 RX ADMIN — PANTOPRAZOLE SODIUM 40 MILLIGRAM(S): 20 TABLET, DELAYED RELEASE ORAL at 05:26

## 2024-06-13 RX ADMIN — LIDOCAINE 2 PATCH: 4 CREAM TOPICAL at 15:04

## 2024-06-13 RX ADMIN — ALBUTEROL 2.5 MILLIGRAM(S): 90 AEROSOL, METERED ORAL at 10:05

## 2024-06-13 RX ADMIN — ATORVASTATIN CALCIUM 20 MILLIGRAM(S): 80 TABLET, FILM COATED ORAL at 22:55

## 2024-06-13 RX ADMIN — Medication 81 MILLIGRAM(S): at 11:07

## 2024-06-13 NOTE — PROGRESS NOTE ADULT - NS ATTEND AMEND GEN_ALL_CORE FT
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 Now transferred to RCU 5/16 for further management.     Pt initially with acute encephalopathy concerning for baclofen toxicity followed by MRI Head (5/6) found to have left talya and left cerebellum ischemic infarct with no vessel occlusion/stenosis on MRA. Pt now clinically improved, encephalopathy resolving. Is able to communicate spontaneously but remains physically weakened from CVA and prolonged hospitalization with critical illness. PM&R consulted, dispo to acute rehab. Continue on NOAC and statin. Neurology recs appreciated. PT/OT following.     Pt with acute combined hypoxemic and hypercapnic respiratory failure s/p ETT intubation/MV with failure to wean from ventilator requiring tracheostomy placement (IP 5/14). Pt now weaned to TC ATC (5/30) with PMV with independent use. ABG on current settings wnl. Trach care and suctioning as per RCU team. Oral hygiene and aspiration precautions in place. Wean O2 supplementation for goal O2 saturation 90-95%. Airway clearance therapy in place.     Pt initially admitted with demand ischemia versus NSTEMI. EKG concerning for ST deviation/t-wave peak with (+) troponin leak. TTE (4/30) without regional wall abnormalities. No indication for catheterization at this time. Cardiology following and recs appreciated. Pt remains HD stable and clinically euvolemic. No events on telemetry. Cardiology following, recs appreciated.     Pt with oropharyngeal dysphagia s/p PEG placement (Surgery, 5/17). Pt tolerating feeds at goal rate. SLP evaluated today, pt did not pass green dye test. Pt with concern for melena on 5/26 with blood loss anemia, now resolved. GI consulted, but not a candidate for endoscopy given high surgical risk. Will CTM carefully. PPI BID continued. CBC q24hr.    Pt with ESRD via right AVF found to have stenosis requiring temporary access. Right Shiley removed (5/10) 2/2 bacteremia, replaced 5/13 and then a gain 5/27 2/2 recurrent fevers. Fevers resolved, pt with new Shiley 6/1. Pt s/p RUE fistuloplasty (6/4) now tolerating HD via AVF. HD as per nephrology team, scheduled for tomorrow.     Hospital course further c/b aspiration PNA c/b B. cereus bacteremia (5/10) with clearance of cx 5/12, s/p completion of Abx with Zosyn through 5/27 and Vancomycin through 5/21. Most recently pt with recurrent thick secretions now completing course of ciprofloxacin (6/7-6/11.)     Hospital course further c/b RLE DVT (5/12/24) now on NOAC since 6/6. Tolerating well. Will c/w current therapy x3-6 months.    Dispo pending medical optimization. Pt full code. DVT ppx full A/C with NOAC. Discussed with pt and wife at bedside today, will continue to update throughout hospitalization. Plan for discharge to acute rehab with HD.

## 2024-06-13 NOTE — PROGRESS NOTE ADULT - SUBJECTIVE AND OBJECTIVE BOX
Patient is a 73y Male     Patient is a 73y old  Male who presents with a chief complaint of Unstable angina, abn EKG (12 Jun 2024 23:22)      HPI:  73-year-old male past medical history hypertension, ESRD on dialysis Monday Wednesday Friday, diabetes insulin-dependent presents with hiccups patient endorses persistent hiccups for the last 2 days. found to have peaked T waves, a new finding. denies dizziness, N/V, denies CP, palps, abd pain (29 Apr 2024 21:15)      PAST MEDICAL & SURGICAL HISTORY:  Diabetes      Benign essential HTN      HLD (hyperlipidemia)      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      ESRD on hemodialysis      Arteriovenous fistula          MEDICATIONS  (STANDING):  albuterol    0.083% 2.5 milliGRAM(s) Nebulizer every 6 hours  amLODIPine   Tablet 10 milliGRAM(s) Oral daily  apixaban 5 milliGRAM(s) Enteral Tube every 12 hours  aspirin  chewable 81 milliGRAM(s) Oral daily  atorvastatin 20 milliGRAM(s) Oral at bedtime  carvedilol 25 milliGRAM(s) Oral every 12 hours  chlorhexidine 0.12% Liquid 15 milliLiter(s) Oral Mucosa two times a day  chlorhexidine 2% Cloths 1 Application(s) Topical <User Schedule>  dextrose 10% Bolus 125 milliLiter(s) IV Bolus once  dextrose 5%. 1000 milliLiter(s) (100 mL/Hr) IV Continuous <Continuous>  dextrose 5%. 1000 milliLiter(s) (50 mL/Hr) IV Continuous <Continuous>  dextrose 50% Injectable 25 Gram(s) IV Push once  dextrose 50% Injectable 12.5 Gram(s) IV Push once  epoetin reilly (EPOGEN) Injectable 96564 Unit(s) IV Push <User Schedule>  ferrous    sulfate Liquid 300 milliGRAM(s) Enteral Tube daily  glucagon  Injectable 1 milliGRAM(s) IntraMuscular once  hydrALAZINE 100 milliGRAM(s) Oral three times a day  insulin lispro (ADMELOG) corrective regimen sliding scale   SubCutaneous every 6 hours  insulin NPH human recombinant 4 Unit(s) SubCutaneous every 6 hours  lidocaine   4% Patch 2 Patch Transdermal every 24 hours  pantoprazole  Injectable 40 milliGRAM(s) IV Push every 12 hours  polyethylene glycol 3350 17 Gram(s) Oral daily  senna 2 Tablet(s) Oral at bedtime      Allergies    No Known Allergies    Intolerances        SOCIAL HISTORY:  Denies ETOh,Smoking,     FAMILY HISTORY:  FHx: diabetes mellitus (Father, Aunt)        REVIEW OF SYSTEMS:    CONSTITUTIONAL: No weakness, fevers or chills  EYES/ENT: No visual changes;  No vertigo or throat pain   NECK: No pain or stiffness  RESPIRATORY: No cough, wheezing, hemoptysis; No shortness of breath  CARDIOVASCULAR: No chest pain or palpitations  GASTROINTESTINAL: No abdominal or epigastric pain. No nausea, vomiting, or hematemesis; No diarrhea or constipation. No melena or hematochezia.  GENITOURINARY: No dysuria, frequency or hematuria  NEUROLOGICAL: No numbness or weakness  SKIN: No itching, burning, rashes, or lesions   All other review of systems is negative unless indicated above.    VITAL:  T(C): , Max: 37 (06-13-24 @ 00:00)  T(F): , Max: 98.6 (06-13-24 @ 00:00)  HR: 56 (06-13-24 @ 04:00)  BP: 146/58 (06-13-24 @ 04:00)  BP(mean): 80 (06-12-24 @ 19:45)  RR: 20 (06-13-24 @ 04:00)  SpO2: 100% (06-13-24 @ 04:00)  Wt(kg): --    I and O's:    06-12 @ 07:01  -  06-13 @ 07:00  --------------------------------------------------------  IN: 1830 mL / OUT: 2400 mL / NET: -570 mL          PHYSICAL EXAM:    Constitutional: NAD  HEENT: PERRLA,   Neck: No JVD  Respiratory: CTA B/L  Cardiovascular: S1 and S2  Gastrointestinal: BS+, soft, NT/ND  Extremities: No peripheral edema  Neurological: A/O x 3, no focal deficits  Psychiatric: Normal mood, normal affect  : No Abbasi  Skin: No rashes  Access: Not applicable  Back: No CVA tenderness    LABS:    06-11    136  |  95<L>  |  48<H>  ----------------------------<  158<H>  4.0   |  28  |  3.72<H>    Ca    8.5      11 Jun 2024 08:38  Phos  2.7     06-11  Mg     2.40     06-11            RADIOLOGY & ADDITIONAL STUDIES:

## 2024-06-13 NOTE — PROGRESS NOTE ADULT - SUBJECTIVE AND OBJECTIVE BOX
Patient is a 73y old  Male who presents with a chief complaint of Unstable angina, abn EKG (13 Jun 2024 08:15)      HPI:  73-year-old male past medical history hypertension, ESRD on dialysis Monday Wednesday Friday, diabetes insulin-dependent presents with hiccups patient endorses persistent hiccups for the last 2 days. found to have peaked T waves, a new finding. denies dizziness, N/V, denies CP, palps, abd pain (29 Apr 2024 21:15)      REVIEW OF SYSTEMS  weakness    PAST MEDICAL & SURGICAL HISTORY  Diabetes    Stage 5 chronic kidney disease not on chronic dialysis    Benign essential HTN    HLD (hyperlipidemia)    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    ESRD on hemodialysis    No significant past surgical history    AVF (arteriovenous fistula)    Arteriovenous fistula         CURRENT FUNCTIONAL STATUS  6/12  Sit-Stand Transfer Training  Sit-to-Stand Transfer Training Rehab Potential: good, to achieve stated therapy goals  Sit-to-Stand Transfer Training Symptoms Noted During/After Treatment: none  Transfer Training Sit-to-Stand Transfer: moderate assist (50% patient effort);  1 person assist;  nonverbal cues (demo/gestures);  verbal cues;  full weight-bearing   rolling walker  Transfer Training Stand-to-Sit Transfer: moderate assist (50% patient effort);  1 person assist;  nonverbal cues (demo/gestures);  verbal cues;  full weight-bearing   rolling walker  Sit-to-Stand Transfer Training Transfer Safety Analysis: decreased weight-shifting ability;  decreased strength;  impaired balance;  rolling walker    Therapeutic Exercise  Therapeutic Exercise Rehab Effort: good  Therapeutic Exercise Symptoms Noted During/After Treatment: none  Therapeutic Exercise Detail: Patient participated in active seated therapeutic exercises 2 x 10 throughout bilateral lower extremities with maximal encouragement and cueing.           FAMILY HISTORY   FHx: diabetes mellitus (Father, Aunt)        RECENT LABS/IMAGING    06-13    133<L>  |  94<L>  |  46<H>  ----------------------------<  183<H>  4.5   |  27  |  3.51<H>    Ca    8.7      13 Jun 2024 05:30  Phos  2.7     06-13  Mg     2.40     06-13      Urinalysis Basic - ( 13 Jun 2024 05:30 )    Color: x / Appearance: x / SG: x / pH: x  Gluc: 183 mg/dL / Ketone: x  / Bili: x / Urobili: x   Blood: x / Protein: x / Nitrite: x   Leuk Esterase: x / RBC: x / WBC x   Sq Epi: x / Non Sq Epi: x / Bacteria: x        VITALS  T(C): 36.6 (06-13-24 @ 11:05), Max: 37 (06-13-24 @ 00:00)  HR: 61 (06-13-24 @ 11:05) (54 - 72)  BP: 162/56 (06-13-24 @ 11:05) (146/58 - 162/56)  RR: 18 (06-13-24 @ 11:05) (18 - 20)  SpO2: 99% (06-13-24 @ 11:05) (97% - 100%)  Wt(kg): --    ALLERGIES  No Known Allergies      MEDICATIONS   acetaminophen   Oral Liquid .. 650 milliGRAM(s) Oral every 6 hours PRN  albuterol    0.083% 2.5 milliGRAM(s) Nebulizer every 6 hours  amLODIPine   Tablet 10 milliGRAM(s) Oral daily  apixaban 5 milliGRAM(s) Enteral Tube every 12 hours  aspirin  chewable 81 milliGRAM(s) Oral daily  atorvastatin 20 milliGRAM(s) Oral at bedtime  carvedilol 25 milliGRAM(s) Oral every 12 hours  chlorhexidine 0.12% Liquid 15 milliLiter(s) Oral Mucosa two times a day  chlorhexidine 2% Cloths 1 Application(s) Topical <User Schedule>  dextrose 10% Bolus 125 milliLiter(s) IV Bolus once  dextrose 5%. 1000 milliLiter(s) IV Continuous <Continuous>  dextrose 5%. 1000 milliLiter(s) IV Continuous <Continuous>  dextrose 50% Injectable 25 Gram(s) IV Push once  dextrose 50% Injectable 12.5 Gram(s) IV Push once  dextrose Oral Gel 15 Gram(s) Oral once PRN  epoetin reilly (EPOGEN) Injectable 85544 Unit(s) IV Push <User Schedule>  ferrous    sulfate Liquid 300 milliGRAM(s) Enteral Tube daily  glucagon  Injectable 1 milliGRAM(s) IntraMuscular once  hydrALAZINE 100 milliGRAM(s) Oral three times a day  hydrALAZINE Injectable 10 milliGRAM(s) IV Push every 8 hours PRN  insulin lispro (ADMELOG) corrective regimen sliding scale   SubCutaneous every 6 hours  insulin NPH human recombinant 4 Unit(s) SubCutaneous every 6 hours  lidocaine   4% Patch 2 Patch Transdermal every 24 hours  pantoprazole  Injectable 40 milliGRAM(s) IV Push every 12 hours  polyethylene glycol 3350 17 Gram(s) Oral daily  senna 2 Tablet(s) Oral at bedtime  sodium chloride 0.9% lock flush 10 milliLiter(s) IV Push every 1 hour PRN      ----------------------------------------------------------------------------------------    PHYSICAL EXAM    Constitutional - NAD in chair  HEENT - + tc  Chest -no respiratory distress  Cardiovascular - RRR, S1S2   Abdomen -  +PEG, Soft, NTND  Extremities - No C/C/E, No calf tenderness   Neurologic Exam -                    Cognitive - Awake, Alert      Communication - Fluent, No dysarthria     Cranial Nerves - CN 2-12 intact     Motor -  unchanged     Sensory - Intact to LT           Balance - WNL Static  Psychiatric - Mood stable, Affect WNL  ----------------------------------------------------------------------------------------  ASSESSMENT/PLAN  73 year old male, history of ESRD on HD MWF, HTN, and IDDM2 A1C 6.9 who presented chest pain complicated by hiccups x 2 days and admitted for NSTEMI, course complicated by baclofen toxicity, GI bleed, dvt  continue bedside PT and OT  SLP    DVT PPX - eliquis  out of bed to chair as tolerates  Rehab - recommend acute inpatient rehab when medically cleared. Patient can tolerate 3 hours per day of therapy with medical supervision

## 2024-06-13 NOTE — PROGRESS NOTE ADULT - SUBJECTIVE AND OBJECTIVE BOX
CHIEF COMPLAINT: Patient is a 73y old  Male who presents with a chief complaint of Unstable angina, abn EKG (13 Jun 2024 07:10)      INTERVAL EVENTS:   - No acute overnight events.   - DC planning to Rehab.    ROS: Seen by bedside during AM rounds     OBJECTIVE:  ICU Vital Signs Last 24 Hrs  T(C): 36.8 (13 Jun 2024 04:00), Max: 37 (13 Jun 2024 00:00)  T(F): 98.2 (13 Jun 2024 04:00), Max: 98.6 (13 Jun 2024 00:00)  HR: 56 (13 Jun 2024 04:00) (54 - 72)  BP: 146/58 (13 Jun 2024 04:00) (116/55 - 154/61)  BP(mean): 80 (12 Jun 2024 19:45) (73 - 80)  ABP: --  ABP(mean): --  RR: 20 (13 Jun 2024 04:00) (16 - 20)  SpO2: 100% (13 Jun 2024 04:00) (96% - 100%)    O2 Parameters below as of 13 Jun 2024 04:00  Patient On (Oxygen Delivery Method): tracheostomy collar  O2 Flow (L/min): 7  O2 Concentration (%): 28          06-12 @ 07:01  -  06-13 @ 07:00  --------------------------------------------------------  IN: 1830 mL / OUT: 2400 mL / NET: -570 mL      CAPILLARY BLOOD GLUCOSE      POCT Blood Glucose.: 194 mg/dL (13 Jun 2024 05:33)      PHYSICAL EXAM:  General:   HEENT:   Lymph Nodes:  Neck:   Respiratory:   Cardiovascular:   Abdomen:   Extremities:   Skin:   Neurological:  Psychiatry:        HOSPITAL MEDICATIONS:  MEDICATIONS  (STANDING):  albuterol    0.083% 2.5 milliGRAM(s) Nebulizer every 6 hours  amLODIPine   Tablet 10 milliGRAM(s) Oral daily  apixaban 5 milliGRAM(s) Enteral Tube every 12 hours  aspirin  chewable 81 milliGRAM(s) Oral daily  atorvastatin 20 milliGRAM(s) Oral at bedtime  carvedilol 25 milliGRAM(s) Oral every 12 hours  chlorhexidine 0.12% Liquid 15 milliLiter(s) Oral Mucosa two times a day  chlorhexidine 2% Cloths 1 Application(s) Topical <User Schedule>  dextrose 10% Bolus 125 milliLiter(s) IV Bolus once  dextrose 5%. 1000 milliLiter(s) (100 mL/Hr) IV Continuous <Continuous>  dextrose 5%. 1000 milliLiter(s) (50 mL/Hr) IV Continuous <Continuous>  dextrose 50% Injectable 25 Gram(s) IV Push once  dextrose 50% Injectable 12.5 Gram(s) IV Push once  epoetin reilly (EPOGEN) Injectable 69391 Unit(s) IV Push <User Schedule>  ferrous    sulfate Liquid 300 milliGRAM(s) Enteral Tube daily  glucagon  Injectable 1 milliGRAM(s) IntraMuscular once  hydrALAZINE 100 milliGRAM(s) Oral three times a day  insulin lispro (ADMELOG) corrective regimen sliding scale   SubCutaneous every 6 hours  insulin NPH human recombinant 4 Unit(s) SubCutaneous every 6 hours  lidocaine   4% Patch 2 Patch Transdermal every 24 hours  pantoprazole  Injectable 40 milliGRAM(s) IV Push every 12 hours  polyethylene glycol 3350 17 Gram(s) Oral daily  senna 2 Tablet(s) Oral at bedtime    MEDICATIONS  (PRN):  acetaminophen   Oral Liquid .. 650 milliGRAM(s) Oral every 6 hours PRN Temp greater or equal to 38C (100.4F), Moderate Pain (4 - 6)  dextrose Oral Gel 15 Gram(s) Oral once PRN Blood Glucose LESS THAN 70 milliGRAM(s)/deciliter  hydrALAZINE Injectable 10 milliGRAM(s) IV Push every 8 hours PRN SBP > 180  sodium chloride 0.9% lock flush 10 milliLiter(s) IV Push every 1 hour PRN Pre/post blood products, medications, blood draw, and to maintain line patency      LABS:    06-11    136  |  95<L>  |  48<H>  ----------------------------<  158<H>  4.0   |  28  |  3.72<H>    Ca    8.5      11 Jun 2024 08:38  Phos  2.7     06-11  Mg     2.40     06-11        Urinalysis Basic - ( 11 Jun 2024 08:38 )    Color: x / Appearance: x / SG: x / pH: x  Gluc: 158 mg/dL / Ketone: x  / Bili: x / Urobili: x   Blood: x / Protein: x / Nitrite: x   Leuk Esterase: x / RBC: x / WBC x   Sq Epi: x / Non Sq Epi: x / Bacteria: x         CHIEF COMPLAINT: Patient is a 73y old  Male who presents with a chief complaint of Unstable angina, abn EKG (13 Jun 2024 07:10)      INTERVAL EVENTS:   - No acute overnight events.   - DC planning to Rehab.    ROS: Seen by bedside during AM round, denies cp or sob. eager to go to rehab     OBJECTIVE:  ICU Vital Signs Last 24 Hrs  T(C): 36.8 (13 Jun 2024 04:00), Max: 37 (13 Jun 2024 00:00)  T(F): 98.2 (13 Jun 2024 04:00), Max: 98.6 (13 Jun 2024 00:00)  HR: 56 (13 Jun 2024 04:00) (54 - 72)  BP: 146/58 (13 Jun 2024 04:00) (116/55 - 154/61)  BP(mean): 80 (12 Jun 2024 19:45) (73 - 80)  ABP: --  ABP(mean): --  RR: 20 (13 Jun 2024 04:00) (16 - 20)  SpO2: 100% (13 Jun 2024 04:00) (96% - 100%)    O2 Parameters below as of 13 Jun 2024 04:00  Patient On (Oxygen Delivery Method): tracheostomy collar  O2 Flow (L/min): 7  O2 Concentration (%): 28          06-12 @ 07:01  -  06-13 @ 07:00  --------------------------------------------------------  IN: 1830 mL / OUT: 2400 mL / NET: -570 mL      CAPILLARY BLOOD GLUCOSE      POCT Blood Glucose.: 194 mg/dL (13 Jun 2024 05:33)      General: NAD  Neck: TC, site is clean dry and intact, no JVD  Respiratory: clear to ausculation b/l, no rales, rhonchi or wheezing  Cardiovascular: normal s1, s2, regular rate and rhythm  Abdomen: soft, non tender, +PEG tube  Extremities: no pedal edema  Skin: warm, dry  Neurological: AAO x 4, awake and alert, follows commands, moves extremities freely  Psychiatry: normal affect, normal behavior         HOSPITAL MEDICATIONS:  MEDICATIONS  (STANDING):  albuterol    0.083% 2.5 milliGRAM(s) Nebulizer every 6 hours  amLODIPine   Tablet 10 milliGRAM(s) Oral daily  apixaban 5 milliGRAM(s) Enteral Tube every 12 hours  aspirin  chewable 81 milliGRAM(s) Oral daily  atorvastatin 20 milliGRAM(s) Oral at bedtime  carvedilol 25 milliGRAM(s) Oral every 12 hours  chlorhexidine 0.12% Liquid 15 milliLiter(s) Oral Mucosa two times a day  chlorhexidine 2% Cloths 1 Application(s) Topical <User Schedule>  dextrose 10% Bolus 125 milliLiter(s) IV Bolus once  dextrose 5%. 1000 milliLiter(s) (100 mL/Hr) IV Continuous <Continuous>  dextrose 5%. 1000 milliLiter(s) (50 mL/Hr) IV Continuous <Continuous>  dextrose 50% Injectable 25 Gram(s) IV Push once  dextrose 50% Injectable 12.5 Gram(s) IV Push once  epoetin reilly (EPOGEN) Injectable 45044 Unit(s) IV Push <User Schedule>  ferrous    sulfate Liquid 300 milliGRAM(s) Enteral Tube daily  glucagon  Injectable 1 milliGRAM(s) IntraMuscular once  hydrALAZINE 100 milliGRAM(s) Oral three times a day  insulin lispro (ADMELOG) corrective regimen sliding scale   SubCutaneous every 6 hours  insulin NPH human recombinant 4 Unit(s) SubCutaneous every 6 hours  lidocaine   4% Patch 2 Patch Transdermal every 24 hours  pantoprazole  Injectable 40 milliGRAM(s) IV Push every 12 hours  polyethylene glycol 3350 17 Gram(s) Oral daily  senna 2 Tablet(s) Oral at bedtime    MEDICATIONS  (PRN):  acetaminophen   Oral Liquid .. 650 milliGRAM(s) Oral every 6 hours PRN Temp greater or equal to 38C (100.4F), Moderate Pain (4 - 6)  dextrose Oral Gel 15 Gram(s) Oral once PRN Blood Glucose LESS THAN 70 milliGRAM(s)/deciliter  hydrALAZINE Injectable 10 milliGRAM(s) IV Push every 8 hours PRN SBP > 180  sodium chloride 0.9% lock flush 10 milliLiter(s) IV Push every 1 hour PRN Pre/post blood products, medications, blood draw, and to maintain line patency      LABS:    06-11    136  |  95<L>  |  48<H>  ----------------------------<  158<H>  4.0   |  28  |  3.72<H>    Ca    8.5      11 Jun 2024 08:38  Phos  2.7     06-11  Mg     2.40     06-11        Urinalysis Basic - ( 11 Jun 2024 08:38 )    Color: x / Appearance: x / SG: x / pH: x  Gluc: 158 mg/dL / Ketone: x  / Bili: x / Urobili: x   Blood: x / Protein: x / Nitrite: x   Leuk Esterase: x / RBC: x / WBC x   Sq Epi: x / Non Sq Epi: x / Bacteria: x

## 2024-06-13 NOTE — PROGRESS NOTE ADULT - ASSESSMENT
74 YO M with PMHx of ESRD on HD MWF, HTN, and 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 Baclofen started and course complicated by AMS second to baclofen toxicity requiring intubation and MICU transfer. Course further complicated by LEFT pontine and cerebellar stroke, R AVF stenosis requiring fistuloplasty, aspiration PNA, and B Cereus bacteremia and RLE DVT. s/p trach and transferred to RCU 5/16 and course complicated by intermittent fever spikes with likely aspiration citrobacter PNA, AOCD, and GIB vs Fe stools. s/p fistulogram 6/4 and able to wean off vent to TC ATC.     NEUROLOGY  # AMS   - MRI HEAD (5/6) with punctate foci in the left talya and left cerebellum with concern for acute infarct.   - MRA HEAD and NECK (5/6) with no large vessel occlusion or stenosis.  - Continue on Lipitor for medical management     PSYCH   # Depression   - Concern for depression with questionable SI   - CO started, however formal discussion and evaluation with no true SI   - Supportive care and encouragement QD    CARDIOVASCULAR  # CP with NSTEMI < demand ischemia with HTN   - Admitted for CP and HTN with peaked T WAVES and ECG with ST deviation on admission   - Troponins mildly elevated on admission with negative CKMB   - TTE (4/30) with EF 72, normal LVRVSF, and no regional wall motion abnormalities   - No need to pursue cath at this time     # Septic vs vasoplegic shock  # Hx of HTN   - Home BP medications held and pressors weaned off   - Continue on coreg 25 (increased 6/1), hydralazine 100 TID and Norvasc 10mg (increased 5/31)   - Monitor blood pressures with Hydralazine 10mg IVP Q8H PRN   - IF remains hypertensive then add Isordil     RESPIRATORY  # Respiratory failure   - AMS noted with baclofen toxicity requiring intubation   - Attempted extubation in MICU however course complicated by aspiration and prolonged course and now s/p tracheostomy with IP on 5/14  - Continued on vent 12/500/5/30 and weaned to TC ATC   - Continue on TC ATC (5/30 - )   - Able to tolerate PMV during the day with good phonation   - Copious secretions this secretions but hold HTS as strong cough and continue on albuterol and NS 0.9%  - 6/12 improved secretions     GI  # Dysphagia   - s/p surgical PEG 5/17   - Failed green dye on 6/1 and continue on tube feeds via PEG   - Continue with PMV during the day this week and reattempt SS sometime this week.   - RPT SLP (6/11) again with e/o gross aspiration via green dye test    # GIB  - Noted with several episodes of black stool with drop in Hgb (5/26 overnight) requiring PRBC with appropriate response  - Case discussed with GI, questionable anemia from AOCD with ESRD and black stool likely from iron, and no plan for scope at this time  - Continue on PPI and monitor stools    RENAL  # ESRD on HD MWF with R BCF  # Fistula stenosis   - VA AVF (5/2) with Right radiocephalic arteriovenous fistula has no hemodynamically significant stenosis present at the anastomotic site ( cm/sec, EDV 49 cm/sec). The usable segment is partially thrombosed with minimal patency.  - Required R FEMORAL line on medicine, then removed on 5/2, and replaced with R IJ SHILEY on 5/3 for CRRT then converted back to iHD MWF   - R IJ SHILEY removed on 5/10 second to bacteremia and replaced on 5/13  - R IJ SHILEY removed 5/27 given intermittent fevers    - Blood cultures negative and replaced SHILEY on 6/1   - Vein mapping with no adequate veins in UE for conduit (all <2 mm and/or thrombosed) and pending possible revision of RUE AVF.   - s/p RUE Fistuloplasty on 6/4   - s/p Successfully HD via RUE AVF on 6/5  - s/p R IJ Shiley removed 6/6  - Continue on HD MWF per Renal     # Hyperphosphatemia   - Renvela 1600 however phos corrects well with HD   - Monitor off Renvela     INFECTIOUS DISEASE  # Citrobacter aspiration vs trachitis concern   - Recurrent fever spike and CXR with RLL opacity   - BCx (5/25 and 5/30) negative   - SCx (5/25) negative   - SCx (5/30) with citrobacter and completed Zosyn empirically (5/18 - 5/28)  - Thick secretions without fever and RPT SCx negative, however will empirically tx with cipro (6/7 - )   - secretions improved 6/12    # B Cereus Bacteremia   - Course complicated by fevers and aspiration event   - MRSA (5/1) negative   - BCx (5/2) negative   - SCx (5/4) and BAL (5/12) negative   - BCx (5/10) with bacillus cereus and cleared on (5/12).   - Case discussed with ID and s/p Vancomycin through 5/21 x 10 day course.   - Continue to monitor off antibiotics per ID     HEME  # AOCD with ESRD   - Anemia panel with AOCD and low % sat   - Continue on EPO 10K and Ferrous supplement  - s/p PRBC on 5/16, 5/26 and 6/7 and will monitor HH     VASCULAR   # RLE DVT   - CT AP (5/12) with nonocclusive RIGHT common iliac vein deep venous thrombosis and case discussed with IR with no plans for IVC filter.   - RLE duplex 5/28 with no evidence of DVT  - Initially started on a Heparin GTT with course complicated by questionable GIB given dark tarry stools, however more likely second to iron tabs.   - Discontinued heparin GTT 6/6 and transitioned to Eliquis    ENDOCRINE  # IDDM2 A1C 6.9  - c/w NPH 4U Q6 and ISS  - Monitor and adjust insulin based on FS     SKIN  - R FEMORAL (5/1-5/2)   - R IJ SHILEY (5/3-5/10)   - R IJ SHILEY (5/13 - 5/27)   - R IJ SHILEY (6/1 - 6/6)     ETHICS/ GOC    - FULL CODE     DISPO - PMR recc ACUTE    DC Planning    74 YO M with PMHx of ESRD on HD MWF, HTN, and 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 Baclofen started and course complicated by AMS second to baclofen toxicity requiring intubation and MICU transfer. Course further complicated by LEFT pontine and cerebellar stroke, R AVF stenosis requiring fistuloplasty, aspiration PNA, and B Cereus bacteremia and RLE DVT. s/p trach and transferred to RCU 5/16 and course complicated by intermittent fever spikes with likely aspiration citrobacter PNA, AOCD, and GIB vs Fe stools. s/p fistulogram 6/4 and able to wean off vent to TC ATC.     NEUROLOGY  # AMS   - MRI HEAD (5/6) with punctate foci in the left talya and left cerebellum with concern for acute infarct.   - MRA HEAD and NECK (5/6) with no large vessel occlusion or stenosis.  - Continue on Lipitor for medical management     PSYCH   # Depression   - Concern for depression with questionable SI   - CO started, however formal discussion and evaluation with no true SI   - Supportive care and encouragement QD    CARDIOVASCULAR  # CP with NSTEMI < demand ischemia with HTN   - Admitted for CP and HTN with peaked T WAVES and ECG with ST deviation on admission   - Troponins mildly elevated on admission with negative CKMB   - TTE (4/30) with EF 72, normal LVRVSF, and no regional wall motion abnormalities   - No need to pursue cath at this time     # Septic vs vasoplegic shock  # Hx of HTN   - Home BP medications held and pressors weaned off   - Continue on coreg 25 (increased 6/1), hydralazine 100 TID and Norvasc 10mg (increased 5/31)   - Monitor blood pressures with Hydralazine 10mg IVP Q8H PRN   - IF remains hypertensive then add Isordil     RESPIRATORY  # Respiratory failure   - AMS noted with baclofen toxicity requiring intubation   - Attempted extubation in MICU however course complicated by aspiration and prolonged course and now s/p tracheostomy with IP on 5/14  - Continued on vent 12/500/5/30 and weaned to TC ATC   - Continue on TC ATC (5/30 - )   - Able to tolerate PMV during the day with good phonation   - Copious secretions this secretions but hold HTS as strong cough and continue on albuterol and NS 0.9%  - 6/12 - 6/13 improved secretions     GI  # Dysphagia   - s/p surgical PEG 5/17   - Failed green dye on 6/1 and continue on tube feeds via PEG   - Continue with PMV during the day this week and reattempt SS sometime this week.   - RPT SLP (6/11) again with e/o gross aspiration via green dye test    # GIB  - Noted with several episodes of black stool with drop in Hgb (5/26 overnight) requiring PRBC with appropriate response  - Case discussed with GI, questionable anemia from AOCD with ESRD and black stool likely from iron, and no plan for scope at this time  - Continue on PPI and monitor stools    RENAL  # ESRD on HD MWF with R BCF  # Fistula stenosis   - VA AVF (5/2) with Right radiocephalic arteriovenous fistula has no hemodynamically significant stenosis present at the anastomotic site ( cm/sec, EDV 49 cm/sec). The usable segment is partially thrombosed with minimal patency.  - Required R FEMORAL line on medicine, then removed on 5/2, and replaced with R IJ SHILEY on 5/3 for CRRT then converted back to iHD MWF   - R IJ SHILEY removed on 5/10 second to bacteremia and replaced on 5/13  - R IJ SHILEY removed 5/27 given intermittent fevers    - Blood cultures negative and replaced SHILEY on 6/1   - Vein mapping with no adequate veins in UE for conduit (all <2 mm and/or thrombosed) and pending possible revision of RUE AVF.   - s/p RUE Fistuloplasty on 6/4   - s/p Successfully HD via RUE AVF on 6/5  - s/p R IJ Shiley removed 6/6  - Continue on HD MWF per Renal     # Hyperphosphatemia   - Renvela 1600 however phos corrects well with HD   - Monitor off Renvela     INFECTIOUS DISEASE  # Citrobacter aspiration vs trachitis concern   - Recurrent fever spike and CXR with RLL opacity   - BCx (5/25 and 5/30) negative   - SCx (5/25) negative   - SCx (5/30) with citrobacter and completed Zosyn empirically (5/18 - 5/28)  - Thick secretions without fever and RPT SCx negative, however will empirically tx with cipro (6/7 - )   - secretions improved 6/12    # B Cereus Bacteremia   - Course complicated by fevers and aspiration event   - MRSA (5/1) negative   - BCx (5/2) negative   - SCx (5/4) and BAL (5/12) negative   - BCx (5/10) with bacillus cereus and cleared on (5/12).   - Case discussed with ID and s/p Vancomycin through 5/21 x 10 day course.   - Continue to monitor off antibiotics per ID     HEME  # AOCD with ESRD   - Anemia panel with AOCD and low % sat   - Continue on EPO 10K and Ferrous supplement  - s/p PRBC on 5/16, 5/26 and 6/7 and will monitor HH     VASCULAR   # RLE DVT   - CT AP (5/12) with nonocclusive RIGHT common iliac vein deep venous thrombosis and case discussed with IR with no plans for IVC filter.   - RLE duplex 5/28 with no evidence of DVT  - Initially started on a Heparin GTT with course complicated by questionable GIB given dark tarry stools, however more likely second to iron tabs.   - Discontinued heparin GTT 6/6 and transitioned to Eliquis    ENDOCRINE  # IDDM2 A1C 6.9  - c/w NPH 4U Q6 and ISS  - Monitor and adjust insulin based on FS     SKIN  - R FEMORAL (5/1-5/2)   - R IJ SHILEY (5/3-5/10)   - R IJ SHILEY (5/13 - 5/27)   - R IJ SHILEY (6/1 - 6/6)     ETHICS/ GOC    - FULL CODE     DISPO - PMR recc ACUTE    DC Planning

## 2024-06-13 NOTE — PROGRESS NOTE ADULT - SUBJECTIVE AND OBJECTIVE BOX
[facility]   MD ELIANE BHAGAT MD ANGELA WONG, PA    TEL:  OFFICE: [phone]  From 5pm-7am Answering Service [phone]    -- RENAL FOLLOW UP NOTE ---Date of Service 06-13-24 @ 16:35    Patient is a 73y old  Male who presents with a chief complaint of Unstable angina, abn EKG (13 Jun 2024 14:01)      Patient seen and examined at bedside. No chest pain/sob    VITALS:  T(F): 97.8 (06-13-24 @ 11:05), Max: 98.6 (06-13-24 @ 00:00)  HR: 56 (06-13-24 @ 16:09)  BP: 162/56 (06-13-24 @ 11:05)  RR: 18 (06-13-24 @ 11:05)  SpO2: 98% (06-13-24 @ 16:09)  Wt(kg): --    06-12 @ 07:01  -  06-13 @ 07:00  --------------------------------------------------------  IN: 1830 mL / OUT: 2400 mL / NET: -570 mL          PHYSICAL EXAM:  General: NAD  Neck: No JVD  Respiratory: CTAB, no wheezes, rales or rhonchi  Cardiovascular: S1, S2, RRR  Gastrointestinal: BS+, soft, NT/ND  Extremities: No peripheral edema    Hospital Medications:   MEDICATIONS  (STANDING):  albuterol    0.083% 2.5 milliGRAM(s) Nebulizer every 6 hours  amLODIPine   Tablet 10 milliGRAM(s) Oral daily  apixaban 5 milliGRAM(s) Enteral Tube every 12 hours  aspirin  chewable 81 milliGRAM(s) Oral daily  atorvastatin 20 milliGRAM(s) Oral at bedtime  carvedilol 25 milliGRAM(s) Oral every 12 hours  chlorhexidine 0.12% Liquid 15 milliLiter(s) Oral Mucosa two times a day  chlorhexidine 2% Cloths 1 Application(s) Topical <User Schedule>  dextrose 10% Bolus 125 milliLiter(s) IV Bolus once  dextrose 5%. 1000 milliLiter(s) (100 mL/Hr) IV Continuous <Continuous>  dextrose 5%. 1000 milliLiter(s) (50 mL/Hr) IV Continuous <Continuous>  dextrose 50% Injectable 25 Gram(s) IV Push once  dextrose 50% Injectable 12.5 Gram(s) IV Push once  epoetin reilly (EPOGEN) Injectable 69250 Unit(s) IV Push <User Schedule>  ferrous    sulfate Liquid 300 milliGRAM(s) Enteral Tube daily  glucagon  Injectable 1 milliGRAM(s) IntraMuscular once  hydrALAZINE 100 milliGRAM(s) Oral three times a day  insulin lispro (ADMELOG) corrective regimen sliding scale   SubCutaneous every 6 hours  insulin NPH human recombinant 4 Unit(s) SubCutaneous every 6 hours  lidocaine   4% Patch 2 Patch Transdermal every 24 hours  pantoprazole  Injectable 40 milliGRAM(s) IV Push every 12 hours  polyethylene glycol 3350 17 Gram(s) Oral daily  senna 2 Tablet(s) Oral at bedtime      LABS:  06-13    133<L>  |  94<L>  |  46<H>  ----------------------------<  183<H>  4.5   |  27  |  3.51<H>    Ca    8.7      13 Jun 2024 05:30  Phos  2.7     06-13  Mg     2.40     06-13      Creatinine Trend: 3.51 <--, 3.72 <--, 3.60 <--, 5.16 <--, 2.77 <--, 4.72 <--    Phosphorus: 2.7 mg/dL (06-13 @ 05:30)          Urine Studies:  Urinalysis - [06-13-24 @ 05:30]      Color  / Appearance  / SG  / pH       Gluc 183 / Ketone   / Bili  / Urobili        Blood  / Protein  / Leuk Est  / Nitrite       RBC  / WBC  / Hyaline  / Gran  / Sq Epi  / Non Sq Epi  / Bacteria       Iron 16, TIBC 121, %sat 13      [05-17-24 @ 06:00]  Ferritin 720      [05-17-24 @ 06:00]  PTH -- (Ca --)      [05-26-24 @ 01:20]   122  TSH 2.60      [05-17-24 @ 06:00]  Lipid: chol 86, , HDL 27, LDL --      [05-17-24 @ 06:00]    HBsAb <3.0      [06-03-24 @ 16:20]  HBsAg Nonreact      [06-03-24 @ 16:20]  HBcAb Nonreact      [06-03-24 @ 16:20]  HCV 0.09, Nonreact      [06-03-24 @ 16:20]      RADIOLOGY & ADDITIONAL STUDIES:

## 2024-06-13 NOTE — PROGRESS NOTE ADULT - SUBJECTIVE AND OBJECTIVE BOX
Cardiovascular Disease Progress Note  DATE OF SERVICE: 24 @ 08:15    Overnight events: No acute events overnight.   The patient is in no distress on trach collar.  No chest pain or SOB.         Objective Findings:  T(C): 36.8 (24 @ 04:00), Max: 37 (24 @ 00:00)  HR: 57 (24 @ 07:46) (54 - 72)  BP: 146/58 (24 @ 04:00) (116/55 - 154/61)  RR: 18 (24 @ 07:46) (16 - 20)  SpO2: 97% (24 @ 07:46) (96% - 100%)  Wt(kg): --  Daily     Daily Weight in k.1 (2024 09:56)      Physical Exam:  Gen: NAD; Patient resting comfortably  HEENT: EOMI, Normocephalic/ atraumatic  CV: RRR, normal S1 + S2, no m/r/g  Lungs:  Normal respiratory effort; clear to auscultation bilaterally  Abd: soft, non-tender; bowel sounds present  Ext: No edema; warm and well perfused    Telemetry: Sinus    Laboratory Data:        133<L>  |  94<L>  |  46<H>  ----------------------------<  183<H>  4.5   |  27  |  3.51<H>    Ca    8.7      2024 05:30  Phos  2.7       Mg     2.40                     Inpatient Medications:  MEDICATIONS  (STANDING):  albuterol    0.083% 2.5 milliGRAM(s) Nebulizer every 6 hours  amLODIPine   Tablet 10 milliGRAM(s) Oral daily  apixaban 5 milliGRAM(s) Enteral Tube every 12 hours  aspirin  chewable 81 milliGRAM(s) Oral daily  atorvastatin 20 milliGRAM(s) Oral at bedtime  carvedilol 25 milliGRAM(s) Oral every 12 hours  chlorhexidine 0.12% Liquid 15 milliLiter(s) Oral Mucosa two times a day  chlorhexidine 2% Cloths 1 Application(s) Topical <User Schedule>  dextrose 10% Bolus 125 milliLiter(s) IV Bolus once  dextrose 5%. 1000 milliLiter(s) (100 mL/Hr) IV Continuous <Continuous>  dextrose 5%. 1000 milliLiter(s) (50 mL/Hr) IV Continuous <Continuous>  dextrose 50% Injectable 25 Gram(s) IV Push once  dextrose 50% Injectable 12.5 Gram(s) IV Push once  epoetin reilly (EPOGEN) Injectable 67361 Unit(s) IV Push <User Schedule>  ferrous    sulfate Liquid 300 milliGRAM(s) Enteral Tube daily  glucagon  Injectable 1 milliGRAM(s) IntraMuscular once  hydrALAZINE 100 milliGRAM(s) Oral three times a day  insulin lispro (ADMELOG) corrective regimen sliding scale   SubCutaneous every 6 hours  insulin NPH human recombinant 4 Unit(s) SubCutaneous every 6 hours  lidocaine   4% Patch 2 Patch Transdermal every 24 hours  pantoprazole  Injectable 40 milliGRAM(s) IV Push every 12 hours  polyethylene glycol 3350 17 Gram(s) Oral daily  senna 2 Tablet(s) Oral at bedtime      Assessment: 73 year old man with HTN, HLD, T2DM on insulin, and ESRD on HD presents with supply demand ischemia and angina.    Plan of Care:    #Type II myocardial infarction-  Secondary to distributive shock earlier on admission.   The repeat echo shows no new wall motion abnormality.   I would not pursue cath at this time given debility and chronic respiratory failure s/p trach .       #HTN-  BP acceptable on current regimen.        #ESRD-  HD as per renal     #DVT   - R common Iliac DVT  AC as per RCU.       #ACP (advance care planning)-  Advanced care planning was discussed with Mr. Art.  Advance care planning forms were discussed.   Cardiac findings were discussed in detail and all questions were answered.   30 additional minutes spent addressing advance care plans.      Over 55 minutes spent on total encounter; more than 50% of the visit was spent counseling and/or coordinating care by the attending physician.      Geovani Bravo MD [facility]  Cardiovascular Disease  [phone]

## 2024-06-13 NOTE — PROGRESS NOTE ADULT - SUBJECTIVE AND OBJECTIVE BOX
Patient is a 73y old  Male who presents with a chief complaint of Unstable angina, abn EKG (13 Jun 2024 16:35)      SUBJECTIVE / OVERNIGHT EVENTS: no new events, dc planning to acute rehab    MEDICATIONS  (STANDING):  albuterol    0.083% 2.5 milliGRAM(s) Nebulizer every 6 hours  amLODIPine   Tablet 10 milliGRAM(s) Oral daily  apixaban 5 milliGRAM(s) Enteral Tube every 12 hours  aspirin  chewable 81 milliGRAM(s) Oral daily  atorvastatin 20 milliGRAM(s) Oral at bedtime  carvedilol 25 milliGRAM(s) Oral every 12 hours  chlorhexidine 0.12% Liquid 15 milliLiter(s) Oral Mucosa two times a day  chlorhexidine 2% Cloths 1 Application(s) Topical <User Schedule>  dextrose 10% Bolus 125 milliLiter(s) IV Bolus once  dextrose 5%. 1000 milliLiter(s) (100 mL/Hr) IV Continuous <Continuous>  dextrose 5%. 1000 milliLiter(s) (50 mL/Hr) IV Continuous <Continuous>  dextrose 50% Injectable 25 Gram(s) IV Push once  dextrose 50% Injectable 12.5 Gram(s) IV Push once  epoetin reilly (EPOGEN) Injectable 69865 Unit(s) IV Push <User Schedule>  ferrous    sulfate Liquid 300 milliGRAM(s) Enteral Tube daily  glucagon  Injectable 1 milliGRAM(s) IntraMuscular once  hydrALAZINE 100 milliGRAM(s) Oral three times a day  insulin lispro (ADMELOG) corrective regimen sliding scale   SubCutaneous every 6 hours  insulin NPH human recombinant 4 Unit(s) SubCutaneous every 6 hours  lidocaine   4% Patch 2 Patch Transdermal every 24 hours  pantoprazole  Injectable 40 milliGRAM(s) IV Push every 12 hours  polyethylene glycol 3350 17 Gram(s) Oral daily  senna 2 Tablet(s) Oral at bedtime    MEDICATIONS  (PRN):  acetaminophen   Oral Liquid .. 650 milliGRAM(s) Oral every 6 hours PRN Temp greater or equal to 38C (100.4F), Moderate Pain (4 - 6)  dextrose Oral Gel 15 Gram(s) Oral once PRN Blood Glucose LESS THAN 70 milliGRAM(s)/deciliter  hydrALAZINE Injectable 10 milliGRAM(s) IV Push every 8 hours PRN SBP > 180  sodium chloride 0.9% lock flush 10 milliLiter(s) IV Push every 1 hour PRN Pre/post blood products, medications, blood draw, and to maintain line patency      Vital Signs Last 24 Hrs  T(F): 97.6 (06-13-24 @ 17:09), Max: 98.6 (06-13-24 @ 00:00)  HR: 60 (06-13-24 @ 17:09) (56 - 61)  BP: 154/57 (06-13-24 @ 17:09) (146/58 - 162/56)  RR: 18 (06-13-24 @ 17:09) (18 - 20)  SpO2: 100% (06-13-24 @ 17:09) (97% - 100%)  Telemetry:   CAPILLARY BLOOD GLUCOSE      POCT Blood Glucose.: 160 mg/dL (13 Jun 2024 17:06)  POCT Blood Glucose.: 203 mg/dL (13 Jun 2024 11:13)  POCT Blood Glucose.: 194 mg/dL (13 Jun 2024 05:33)  POCT Blood Glucose.: 168 mg/dL (13 Jun 2024 00:05)    I&O's Summary    12 Jun 2024 07:01  -  13 Jun 2024 07:00  --------------------------------------------------------  IN: 1830 mL / OUT: 2400 mL / NET: -570 mL    13 Jun 2024 07:01  -  13 Jun 2024 21:48  --------------------------------------------------------  IN: 890 mL / OUT: 0 mL / NET: 890 mL        PHYSICAL EXAM:  GENERAL: NAD, well-developed  HEAD:  Atraumatic, Normocephalic  EYES: EOMI, PERRLA, conjunctiva and sclera clear  NECK: Supple, No JVD  CHEST/LUNG: Clear to auscultation bilaterally; No wheeze  HEART: Regular rate and rhythm; No murmurs, rubs, or gallops  ABDOMEN: Soft, Nontender, Nondistended; Bowel sounds present  EXTREMITIES:  2+ Peripheral Pulses, No clubbing, cyanosis, or edema  PSYCH: AAOx3  NEUROLOGY: non-focal  SKIN: No rashes or lesions    LABS:    06-13    133<L>  |  94<L>  |  46<H>  ----------------------------<  183<H>  4.5   |  27  |  3.51<H>    Ca    8.7      13 Jun 2024 05:30  Phos  2.7     06-13  Mg     2.40     06-13            Urinalysis Basic - ( 13 Jun 2024 05:30 )    Color: x / Appearance: x / SG: x / pH: x  Gluc: 183 mg/dL / Ketone: x  / Bili: x / Urobili: x   Blood: x / Protein: x / Nitrite: x   Leuk Esterase: x / RBC: x / WBC x   Sq Epi: x / Non Sq Epi: x / Bacteria: x        RADIOLOGY & ADDITIONAL TESTS:    Imaging Personally Reviewed:    Consultant(s) Notes Reviewed:      Care Discussed with Consultants/Other Providers:

## 2024-06-13 NOTE — PROGRESS NOTE ADULT - ASSESSMENT
73-year-old male past medical history hypertension, ESRD on dialysis Monday Wednesday Friday, diabetes insulin-dependent presents with hiccups patient endorses persistent hiccups for the last 2 days. Nephrology consulted for HD needs.    A/P  ESRD:  Center: [place]  Nephrologist: Dr. Hardwick  Access: R AVF nonfunction now s/p thrombectomy 6/4.  HD via AVF working well 6/5;  shiley removed 6/6  last hd 6/12 tolerated well uf 2L  hd tmr  Continue MWF  Consent obtained and placed in ED chart  Renal diet  Monitor BMP - no labs today.  Consider [facility] for rehab where his outpatient Nephrologist, Dr. Hardwick can follow him.    Hyperkalemia/Hypokalemia  Improved w/ HD.  C/W HD schedule as above.  Lokelma 10gm PRN for K >5.3 on non-HD days  Low K diet.  Monitor     HTN:  BP  controlled.  On carvedilol 12.5mg BID, hydralazine 100mg TID.  On norvasc -- > down titrate if BP remains marginal / controlled.  UF w/ HD as BP permits.  Monitor BP.    Anemia:  Hgb stable  + iron deficiency.  Tsat 13% - on ferrous sulfate 300mg qd via PEG.  Venofer held d/t leukocytosis.  SILVA w/ HD.  Transfuse for Hgb <7.  Monitor Hb.    CKD-MBD   - low for CKD.  PO4 low; repleted w/ PhosNaK on 6/8.  Supplement PO4 as needed.  Sevelamer 1600mg TID d/c'ed 5/21.  Monitor Ca, PO4 daily    Hypocalcemia:  In setting of CKD vs. hypoalbuminemia.  Optimize albumin.  Corrected Ca WNL.  Replete as needed.  Monitor Ca.    Hyponatremia  in setting of esrd   UF w/ HD.  Free fluid restriction to 1L/day.  monitor

## 2024-06-14 ENCOUNTER — INPATIENT (INPATIENT)
Facility: HOSPITAL | Age: 74
LOS: 25 days | Discharge: ANOTHER HOSPITAL | DRG: 66 | End: 2024-07-10
Attending: PHYSICAL MEDICINE & REHABILITATION | Admitting: PHYSICAL MEDICINE & REHABILITATION
Payer: MEDICARE

## 2024-06-14 VITALS
SYSTOLIC BLOOD PRESSURE: 160 MMHG | OXYGEN SATURATION: 98 % | TEMPERATURE: 98 F | DIASTOLIC BLOOD PRESSURE: 70 MMHG | HEART RATE: 58 BPM | RESPIRATION RATE: 16 BRPM

## 2024-06-14 VITALS — OXYGEN SATURATION: 98 %

## 2024-06-14 DIAGNOSIS — I63.9 CEREBRAL INFARCTION, UNSPECIFIED: ICD-10-CM

## 2024-06-14 DIAGNOSIS — I80.219 PHLEBITIS AND THROMBOPHLEBITIS OF UNSPECIFIED ILIAC VEIN: ICD-10-CM

## 2024-06-14 DIAGNOSIS — I77.0 ARTERIOVENOUS FISTULA, ACQUIRED: Chronic | ICD-10-CM

## 2024-06-14 LAB
GLUCOSE BLDC GLUCOMTR-MCNC: 122 MG/DL — HIGH (ref 70–99)
GLUCOSE BLDC GLUCOMTR-MCNC: 173 MG/DL — HIGH (ref 70–99)
GLUCOSE BLDC GLUCOMTR-MCNC: 242 MG/DL — HIGH (ref 70–99)
SARS-COV-2 RNA SPEC QL NAA+PROBE: SIGNIFICANT CHANGE UP

## 2024-06-14 PROCEDURE — 99233 SBSQ HOSP IP/OBS HIGH 50: CPT

## 2024-06-14 RX ORDER — ACETAMINOPHEN 325 MG
650 TABLET ORAL EVERY 6 HOURS
Refills: 0 | Status: DISCONTINUED | OUTPATIENT
Start: 2024-06-14 | End: 2024-07-10

## 2024-06-14 RX ORDER — POLYETHYLENE GLYCOL 3350 17 G/17G
17 POWDER, FOR SOLUTION ORAL
Qty: 0 | Refills: 0 | DISCHARGE
Start: 2024-06-14

## 2024-06-14 RX ORDER — HYDRALAZINE HYDROCHLORIDE 50 MG/1
100 TABLET ORAL THREE TIMES A DAY
Refills: 0 | Status: DISCONTINUED | OUTPATIENT
Start: 2024-06-14 | End: 2024-07-10

## 2024-06-14 RX ORDER — CARVEDILOL PHOSPHATE 80 MG/1
0.5 CAPSULE, EXTENDED RELEASE ORAL
Qty: 0 | Refills: 0 | DISCHARGE

## 2024-06-14 RX ORDER — APIXABAN 2.5 MG/1
1 TABLET, FILM COATED ORAL
Qty: 0 | Refills: 0 | DISCHARGE
Start: 2024-06-14

## 2024-06-14 RX ORDER — INSULIN LISPRO 100/ML
0 VIAL (ML) SUBCUTANEOUS
Qty: 0 | Refills: 0 | DISCHARGE
Start: 2024-06-14

## 2024-06-14 RX ORDER — ERYTHROPOIETIN 4000 [IU]/ML
10000 INJECTION, SOLUTION INTRAVENOUS; SUBCUTANEOUS
Refills: 0 | Status: DISCONTINUED | OUTPATIENT
Start: 2024-06-14 | End: 2024-06-14

## 2024-06-14 RX ORDER — POLYETHYLENE GLYCOL 3350 1 G/G
17 POWDER ORAL DAILY
Refills: 0 | Status: DISCONTINUED | OUTPATIENT
Start: 2024-06-15 | End: 2024-07-10

## 2024-06-14 RX ORDER — DEXTROSE 30 % IN WATER 30 %
12.5 VIAL (ML) INTRAVENOUS ONCE
Refills: 0 | Status: DISCONTINUED | OUTPATIENT
Start: 2024-06-14 | End: 2024-07-10

## 2024-06-14 RX ORDER — INSULIN NPH HUMAN ISOPHANE 100/ML
4 VIAL (ML) SUBCUTANEOUS EVERY 6 HOURS
Refills: 0 | Status: DISCONTINUED | OUTPATIENT
Start: 2024-06-14 | End: 2024-06-20

## 2024-06-14 RX ORDER — AMLODIPINE BESYLATE 2.5 MG/1
10 TABLET ORAL DAILY
Refills: 0 | Status: DISCONTINUED | OUTPATIENT
Start: 2024-06-15 | End: 2024-07-10

## 2024-06-14 RX ORDER — PANTOPRAZOLE SODIUM 40 MG/10ML
40 INJECTION, POWDER, FOR SOLUTION INTRAVENOUS
Refills: 0 | Status: DISCONTINUED | OUTPATIENT
Start: 2024-06-14 | End: 2024-07-10

## 2024-06-14 RX ORDER — DEXTROSE 30 % IN WATER 30 %
25 VIAL (ML) INTRAVENOUS ONCE
Refills: 0 | Status: DISCONTINUED | OUTPATIENT
Start: 2024-06-14 | End: 2024-07-10

## 2024-06-14 RX ORDER — ERYTHROPOIETIN 10000 [IU]/ML
10000 INJECTION, SOLUTION INTRAVENOUS; SUBCUTANEOUS
Qty: 0 | Refills: 0 | DISCHARGE
Start: 2024-06-14

## 2024-06-14 RX ORDER — FERROUS SULFATE 325(65) MG
300 TABLET ORAL DAILY
Refills: 0 | Status: DISCONTINUED | OUTPATIENT
Start: 2024-06-15 | End: 2024-07-10

## 2024-06-14 RX ORDER — ALBUTEROL 90 UG/1
3 AEROSOL, METERED ORAL
Qty: 0 | Refills: 0 | DISCHARGE
Start: 2024-06-14

## 2024-06-14 RX ORDER — SENNA PLUS 8.6 MG/1
2 TABLET ORAL
Qty: 0 | Refills: 0 | DISCHARGE
Start: 2024-06-14

## 2024-06-14 RX ORDER — ATORVASTATIN CALCIUM 20 MG/1
20 TABLET, FILM COATED ORAL AT BEDTIME
Refills: 0 | Status: DISCONTINUED | OUTPATIENT
Start: 2024-06-14 | End: 2024-07-10

## 2024-06-14 RX ORDER — PANTOPRAZOLE SODIUM 20 MG/1
1 TABLET, DELAYED RELEASE ORAL
Qty: 30 | Refills: 0
Start: 2024-06-14

## 2024-06-14 RX ORDER — HYDRALAZINE HCL 50 MG
1 TABLET ORAL
Qty: 0 | Refills: 0 | DISCHARGE
Start: 2024-06-14

## 2024-06-14 RX ORDER — AMLODIPINE BESYLATE 2.5 MG/1
1 TABLET ORAL
Qty: 0 | Refills: 0 | DISCHARGE
Start: 2024-06-14

## 2024-06-14 RX ORDER — LIDOCAINE 4 G/100G
1 CREAM TOPICAL
Qty: 0 | Refills: 0 | DISCHARGE
Start: 2024-06-14

## 2024-06-14 RX ORDER — INSULIN LISPRO 100 [IU]/ML
INJECTION, SOLUTION SUBCUTANEOUS EVERY 6 HOURS
Refills: 0 | Status: DISCONTINUED | OUTPATIENT
Start: 2024-06-14 | End: 2024-06-17

## 2024-06-14 RX ORDER — FERROUS SULFATE 325(65) MG
5 TABLET ORAL
Qty: 0 | Refills: 0 | DISCHARGE
Start: 2024-06-14

## 2024-06-14 RX ORDER — ASPIRIN 325 MG/1
81 TABLET, FILM COATED ORAL DAILY
Refills: 0 | Status: DISCONTINUED | OUTPATIENT
Start: 2024-06-15 | End: 2024-07-10

## 2024-06-14 RX ORDER — CARVEDILOL PHOSPHATE 80 MG/1
25 CAPSULE, EXTENDED RELEASE ORAL EVERY 12 HOURS
Refills: 0 | Status: DISCONTINUED | OUTPATIENT
Start: 2024-06-14 | End: 2024-06-16

## 2024-06-14 RX ORDER — CARVEDILOL PHOSPHATE 80 MG/1
1 CAPSULE, EXTENDED RELEASE ORAL
Qty: 0 | Refills: 0 | DISCHARGE
Start: 2024-06-14

## 2024-06-14 RX ORDER — SENNOSIDES 8.6 MG
2 TABLET ORAL AT BEDTIME
Refills: 0 | Status: DISCONTINUED | OUTPATIENT
Start: 2024-06-14 | End: 2024-07-10

## 2024-06-14 RX ORDER — BISACODYL 5 MG
10 TABLET, DELAYED RELEASE (ENTERIC COATED) ORAL DAILY
Refills: 0 | Status: DISCONTINUED | OUTPATIENT
Start: 2024-06-14 | End: 2024-07-10

## 2024-06-14 RX ORDER — DEXTROSE MONOHYDRATE AND SODIUM CHLORIDE 5; .3 G/100ML; G/100ML
1000 INJECTION, SOLUTION INTRAVENOUS
Refills: 0 | Status: DISCONTINUED | OUTPATIENT
Start: 2024-06-14 | End: 2024-07-10

## 2024-06-14 RX ORDER — DEXTROSE MONOHYDRATE 100 MG/ML
125 INJECTION, SOLUTION INTRAVENOUS ONCE
Refills: 0 | Status: DISCONTINUED | OUTPATIENT
Start: 2024-06-14 | End: 2024-07-10

## 2024-06-14 RX ORDER — SIMETHICONE 80 MG/1
80 TABLET, CHEWABLE ORAL ONCE
Refills: 0 | Status: COMPLETED | OUTPATIENT
Start: 2024-06-14 | End: 2024-06-14

## 2024-06-14 RX ORDER — DEXTROSE 30 % IN WATER 30 %
15 VIAL (ML) INTRAVENOUS ONCE
Refills: 0 | Status: DISCONTINUED | OUTPATIENT
Start: 2024-06-14 | End: 2024-07-10

## 2024-06-14 RX ORDER — APIXABAN 5 MG/1
5 TABLET, FILM COATED ORAL
Refills: 0 | Status: DISCONTINUED | OUTPATIENT
Start: 2024-06-14 | End: 2024-07-10

## 2024-06-14 RX ORDER — ASPIRIN/CALCIUM CARB/MAGNESIUM 324 MG
1 TABLET ORAL
Qty: 0 | Refills: 0 | DISCHARGE
Start: 2024-06-14

## 2024-06-14 RX ORDER — ALBUTEROL 90 MCG
2.5 AEROSOL REFILL (GRAM) INHALATION EVERY 6 HOURS
Refills: 0 | Status: DISCONTINUED | OUTPATIENT
Start: 2024-06-14 | End: 2024-07-10

## 2024-06-14 RX ORDER — HUMAN INSULIN 100 [IU]/ML
4 INJECTION, SUSPENSION SUBCUTANEOUS
Qty: 0 | Refills: 0 | DISCHARGE
Start: 2024-06-14

## 2024-06-14 RX ORDER — LIDOCAINE HCL 28 MG/G
2 GEL TOPICAL EVERY 24 HOURS
Refills: 0 | Status: DISCONTINUED | OUTPATIENT
Start: 2024-06-15 | End: 2024-06-17

## 2024-06-14 RX ORDER — ERYTHROPOIETIN 4000 [IU]/ML
10000 INJECTION, SOLUTION INTRAVENOUS; SUBCUTANEOUS
Refills: 0 | Status: DISCONTINUED | OUTPATIENT
Start: 2024-06-17 | End: 2024-07-02

## 2024-06-14 RX ORDER — ACETAMINOPHEN 500 MG
20.31 TABLET ORAL
Qty: 0 | Refills: 0 | DISCHARGE
Start: 2024-06-14

## 2024-06-14 RX ORDER — GLUCAGON HYDROCHLORIDE 1 MG/ML
1 INJECTION, POWDER, FOR SOLUTION INTRAMUSCULAR; INTRAVENOUS; SUBCUTANEOUS ONCE
Refills: 0 | Status: DISCONTINUED | OUTPATIENT
Start: 2024-06-14 | End: 2024-07-10

## 2024-06-14 RX ADMIN — HYDRALAZINE HYDROCHLORIDE 100 MILLIGRAM(S): 50 TABLET ORAL at 22:37

## 2024-06-14 RX ADMIN — HUMAN INSULIN 4 UNIT(S): 100 INJECTION, SUSPENSION SUBCUTANEOUS at 11:29

## 2024-06-14 RX ADMIN — Medication 2.5 MILLIGRAM(S): at 21:00

## 2024-06-14 RX ADMIN — ALBUTEROL 2.5 MILLIGRAM(S): 90 AEROSOL, METERED ORAL at 16:42

## 2024-06-14 RX ADMIN — Medication 4: at 11:29

## 2024-06-14 RX ADMIN — POLYETHYLENE GLYCOL 3350 17 GRAM(S): 17 POWDER, FOR SOLUTION ORAL at 11:30

## 2024-06-14 RX ADMIN — CHLORHEXIDINE GLUCONATE 15 MILLILITER(S): 213 SOLUTION TOPICAL at 05:03

## 2024-06-14 RX ADMIN — SIMETHICONE 80 MILLIGRAM(S): 80 TABLET, CHEWABLE ORAL at 14:35

## 2024-06-14 RX ADMIN — Medication 2: at 00:06

## 2024-06-14 RX ADMIN — ERYTHROPOIETIN 10000 UNIT(S): 10000 INJECTION, SOLUTION INTRAVENOUS; SUBCUTANEOUS at 12:28

## 2024-06-14 RX ADMIN — ALBUTEROL 2.5 MILLIGRAM(S): 90 AEROSOL, METERED ORAL at 04:30

## 2024-06-14 RX ADMIN — CARVEDILOL PHOSPHATE 25 MILLIGRAM(S): 80 CAPSULE, EXTENDED RELEASE ORAL at 22:45

## 2024-06-14 RX ADMIN — Medication 2: at 06:27

## 2024-06-14 RX ADMIN — PANTOPRAZOLE SODIUM 40 MILLIGRAM(S): 20 TABLET, DELAYED RELEASE ORAL at 05:03

## 2024-06-14 RX ADMIN — AMLODIPINE BESYLATE 10 MILLIGRAM(S): 2.5 TABLET ORAL at 06:05

## 2024-06-14 RX ADMIN — Medication 100 MILLIGRAM(S): at 04:49

## 2024-06-14 RX ADMIN — Medication 300 MILLIGRAM(S): at 11:30

## 2024-06-14 RX ADMIN — CHLORHEXIDINE GLUCONATE 1 APPLICATION(S): 213 SOLUTION TOPICAL at 05:04

## 2024-06-14 RX ADMIN — Medication 81 MILLIGRAM(S): at 11:30

## 2024-06-14 RX ADMIN — Medication 4 UNIT(S): at 22:44

## 2024-06-14 RX ADMIN — APIXABAN 5 MILLIGRAM(S): 5 TABLET, FILM COATED ORAL at 22:36

## 2024-06-14 RX ADMIN — HUMAN INSULIN 4 UNIT(S): 100 INJECTION, SUSPENSION SUBCUTANEOUS at 00:07

## 2024-06-14 RX ADMIN — APIXABAN 5 MILLIGRAM(S): 2.5 TABLET, FILM COATED ORAL at 06:04

## 2024-06-14 RX ADMIN — ALBUTEROL 2.5 MILLIGRAM(S): 90 AEROSOL, METERED ORAL at 10:31

## 2024-06-14 RX ADMIN — HUMAN INSULIN 4 UNIT(S): 100 INJECTION, SUSPENSION SUBCUTANEOUS at 06:28

## 2024-06-14 RX ADMIN — LIDOCAINE 2 PATCH: 4 CREAM TOPICAL at 14:35

## 2024-06-14 RX ADMIN — Medication 2 TABLET(S): at 22:38

## 2024-06-14 NOTE — PROGRESS NOTE ADULT - NS ATTEND AMEND GEN_ALL_CORE FT
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 Now transferred to RCU 5/16 for further management.     Pt initially with acute encephalopathy concerning for baclofen toxicity followed by MRI Head (5/6) found to have left talya and left cerebellum ischemic infarct with no vessel occlusion/stenosis on MRA. Pt now clinically improved, encephalopathy resolving. Is able to communicate spontaneously but remains physically weakened from CVA and prolonged hospitalization with critical illness. PM&R consulted, dispo to acute rehab. Continue on NOAC and statin. Neurology recs appreciated. PT/OT following.     Pt with acute combined hypoxemic and hypercapnic respiratory failure s/p ETT intubation/MV with failure to wean from ventilator requiring tracheostomy placement (IP 5/14). Pt now weaned to TC ATC (5/30) with PMV with independent use. ABG on current settings wnl. Trach care and suctioning as per RCU team. Oral hygiene and aspiration precautions in place. Wean O2 supplementation for goal O2 saturation 90-95%. Airway clearance therapy in place.     Pt initially admitted with demand ischemia versus NSTEMI. EKG concerning for ST deviation/t-wave peak with (+) troponin leak. TTE (4/30) without regional wall abnormalities. No indication for catheterization at this time. Cardiology following and recs appreciated. Pt remains HD stable and clinically euvolemic. No events on telemetry. Cardiology following, recs appreciated.     Pt with oropharyngeal dysphagia s/p PEG placement (Surgery, 5/17). Pt tolerating feeds at goal rate. SLP evaluated today, pt did not pass green dye test. Pt with concern for melena on 5/26 with blood loss anemia, now resolved. GI consulted, but not a candidate for endoscopy given high surgical risk. Will CTM carefully. PPI BID continued. CBC q24hr.    Pt with ESRD via right AVF found to have stenosis requiring temporary access. Right Shiley removed (5/10) 2/2 bacteremia, replaced 5/13 and then a gain 5/27 2/2 recurrent fevers. Fevers resolved, pt with new Shiley 6/1. Pt s/p RUE fistuloplasty (6/4) now tolerating HD via AVF. HD as per nephrology team, scheduled for tomorrow.     Hospital course further c/b aspiration PNA c/b B. cereus bacteremia (5/10) with clearance of cx 5/12, s/p completion of Abx with Zosyn through 5/27 and Vancomycin through 5/21. Most recently pt with recurrent thick secretions now completing course of ciprofloxacin (6/7-6/11.)     Hospital course further c/b RLE DVT (5/12/24) now on NOAC since 6/6. Tolerating well. Will c/w current therapy x3-6 months.    Pt medically optimized for hospital discharge to acute rehab today. Pt full code. DVT ppx full A/C with NOAC. Discussed with pt and wife at bedside today, will continue to update throughout hospitalization.

## 2024-06-14 NOTE — H&P ADULT - NSHPLABSRESULTS_GEN_ALL_CORE
CT AP 5/12: IMPRESSION: 1.  Nonocclusive RIGHT common iliac vein deep venous thrombosis,  developed from  4/30/2024.    MRI Head 5/6: IMPRESSION: PUNCTATE FOCI OF RESTRICTED DIFFUSION IN THE LEFT DA AND  LEFT CEREBELLUM WITH ASSOCIATED T2 PROLONGATION CONSISTENT WITH ACUTE  INFARCTS. NO ACUTE INTRACRANIAL HEMORRHAGE. NO AREA OF ABNORMAL ENHANCEMENT. CT AP 5/12: IMPRESSION: 1.  Nonocclusive RIGHT common iliac vein deep venous thrombosis,  developed from  4/30/2024.    MRI Head 5/6: IMPRESSION: PUNCTATE FOCI OF RESTRICTED DIFFUSION IN THE LEFT DA AND  LEFT CEREBELLUM WITH ASSOCIATED T2 PROLONGATION CONSISTENT WITH ACUTE  INFARCTS. NO ACUTE INTRACRANIAL HEMORRHAGE. NO AREA OF ABNORMAL ENHANCEMENT.    LABS:    06-13    133<L>  |  94<L>  |  46<H>  ----------------------------<  183<H>  4.5   |  27  |  3.51<H>    Ca    8.7      13 Jun 2024 05:30  Phos  2.7     06-13  Mg     2.40     06-13    < from: TTE Limited W or WO Ultrasound Enhancing Agent (05.04.24 @ 12:00) >  CONCLUSIONS:    1. Left ventricular systolic function is normal with an ejection fraction of 63 % by Tian's method of disks. There are no regional wall motion abnormalities seen.   2. Compared to the transthoracic echocardiogram performed on 4/30/2024, there have been no significant interval changes.  < end of copied text >      < from: TTE W or WO Ultrasound Enhancing Agent (04.30.24 @ 16:56) >  CONCLUSIONS:    1. Left ventricular cavity is small. Left ventricular wall thickness is normal. Left ventricular systolic function is normal with an ejection fraction of 72 % by Tian's method of disks. There are noregional wall motion abnormalities seen. There is normal LV mass and concentric remodeling.   2. Normal left ventricular diastolic function, with normal filling pressure.   3. Normal right ventricular cavity size, with normal wall thickness, and normal systolic function.   4. Normal left and right atrial size.   5. No significant valvular disease.   6. No pericardial effusion seen.  < end of copied text >

## 2024-06-14 NOTE — PROGRESS NOTE ADULT - SUBJECTIVE AND OBJECTIVE BOX
[facility]   MD ELIANE BHAGAT MD ANGELA WONG, PA    TEL:  OFFICE: [phone]  From 5pm-7am Answering Service [phone]    -- RENAL FOLLOW UP NOTE ---Date of Service 06-14-24 @ 11:57    Patient is a 73y old  Male who presents with a chief complaint of Unstable angina, abn EKG (14 Jun 2024 09:12)      Patient seen and examined at bedside. No chest pain/sob    VITALS:  T(F): 97.6 (06-14-24 @ 11:15), Max: 98 (06-13-24 @ 21:00)  HR: 60 (06-14-24 @ 11:15)  BP: 149/56 (06-14-24 @ 11:15)  RR: 18 (06-14-24 @ 11:15)  SpO2: 99% (06-14-24 @ 10:41)  Wt(kg): --    06-13 @ 07:01  -  06-14 @ 07:00  --------------------------------------------------------  IN: 890 mL / OUT: 0 mL / NET: 890 mL    06-14 @ 07:01  -  06-14 @ 11:57  --------------------------------------------------------  IN: 100 mL / OUT: 0 mL / NET: 100 mL          PHYSICAL EXAM:  General: NAD  Neck: trach  Respiratory: CTAB, no wheezes, rales or rhonchi  Cardiovascular: S1, S2, RRR  Gastrointestinal: +peg  Extremities: No peripheral edema    Hospital Medications:   MEDICATIONS  (STANDING):  albuterol    0.083% 2.5 milliGRAM(s) Nebulizer every 6 hours  amLODIPine   Tablet 10 milliGRAM(s) Oral daily  apixaban 5 milliGRAM(s) Enteral Tube every 12 hours  aspirin  chewable 81 milliGRAM(s) Oral daily  atorvastatin 20 milliGRAM(s) Oral at bedtime  carvedilol 25 milliGRAM(s) Oral every 12 hours  chlorhexidine 0.12% Liquid 15 milliLiter(s) Oral Mucosa two times a day  chlorhexidine 2% Cloths 1 Application(s) Topical <User Schedule>  dextrose 10% Bolus 125 milliLiter(s) IV Bolus once  dextrose 5%. 1000 milliLiter(s) (100 mL/Hr) IV Continuous <Continuous>  dextrose 5%. 1000 milliLiter(s) (50 mL/Hr) IV Continuous <Continuous>  dextrose 50% Injectable 25 Gram(s) IV Push once  dextrose 50% Injectable 12.5 Gram(s) IV Push once  epoetin reilly (EPOGEN) Injectable 63041 Unit(s) IV Push <User Schedule>  ferrous    sulfate Liquid 300 milliGRAM(s) Enteral Tube daily  glucagon  Injectable 1 milliGRAM(s) IntraMuscular once  hydrALAZINE 100 milliGRAM(s) Oral three times a day  insulin lispro (ADMELOG) corrective regimen sliding scale   SubCutaneous every 6 hours  insulin NPH human recombinant 4 Unit(s) SubCutaneous every 6 hours  lidocaine   4% Patch 2 Patch Transdermal every 24 hours  pantoprazole  Injectable 40 milliGRAM(s) IV Push every 12 hours  polyethylene glycol 3350 17 Gram(s) Oral daily  senna 2 Tablet(s) Oral at bedtime      LABS:  06-13    133<L>  |  94<L>  |  46<H>  ----------------------------<  183<H>  4.5   |  27  |  3.51<H>    Ca    8.7      13 Jun 2024 05:30  Phos  2.7     06-13  Mg     2.40     06-13      Creatinine Trend: 3.51 <--, 3.72 <--, 3.60 <--, 5.16 <--, 2.77 <--            Urine Studies:  Urinalysis - [06-13-24 @ 05:30]      Color  / Appearance  / SG  / pH       Gluc 183 / Ketone   / Bili  / Urobili        Blood  / Protein  / Leuk Est  / Nitrite       RBC  / WBC  / Hyaline  / Gran  / Sq Epi  / Non Sq Epi  / Bacteria       Iron 16, TIBC 121, %sat 13      [05-17-24 @ 06:00]  Ferritin 720      [05-17-24 @ 06:00]  PTH -- (Ca --)      [05-26-24 @ 01:20]   122  TSH 2.60      [05-17-24 @ 06:00]  Lipid: chol 86, , HDL 27, LDL --      [05-17-24 @ 06:00]    HBsAb <3.0      [06-03-24 @ 16:20]  HBsAg Nonreact      [06-03-24 @ 16:20]  HBcAb Nonreact      [06-03-24 @ 16:20]  HCV 0.09, Nonreact      [06-03-24 @ 16:20]      RADIOLOGY & ADDITIONAL STUDIES:

## 2024-06-14 NOTE — PROGRESS NOTE ADULT - NS ATTEND OPT1 GEN_ALL_CORE
I attest my time as attending is greater than 50% of the total combined time spent on qualifying patient care activities by the PA/NP and attending.

## 2024-06-14 NOTE — PROGRESS NOTE ADULT - SUBJECTIVE AND OBJECTIVE BOX
CHIEF COMPLAINT: Patient is a 73y old  Male who presents with a chief complaint of Unstable angina, abn EKG (14 Jun 2024 07:48)      Interval Events:    REVIEW OF SYSTEMS:  [ ] All other systems negative  [ ] Unable to assess ROS because ________          OBJECTIVE:  ICU Vital Signs Last 24 Hrs  T(C): 36.7 (13 Jun 2024 21:00), Max: 36.7 (13 Jun 2024 21:00)  T(F): 98 (13 Jun 2024 21:00), Max: 98 (13 Jun 2024 21:00)  HR: 57 (14 Jun 2024 05:58) (54 - 61)  BP: 132/90 (14 Jun 2024 05:58) (132/90 - 162/56)  BP(mean): 98 (14 Jun 2024 05:58) (80 - 98)  ABP: --  ABP(mean): --  RR: 18 (14 Jun 2024 05:58) (16 - 18)  SpO2: 100% (14 Jun 2024 05:58) (98% - 100%)    O2 Parameters below as of 14 Jun 2024 05:58  Patient On (Oxygen Delivery Method): tracheostomy collar  O2 Flow (L/min): 6  O2 Concentration (%): 28          06-13 @ 07:01  -  06-14 @ 07:00  --------------------------------------------------------  IN: 890 mL / OUT: 0 mL / NET: 890 mL      CAPILLARY BLOOD GLUCOSE      POCT Blood Glucose.: 173 mg/dL (14 Jun 2024 05:48)      HOSPITAL MEDICATIONS:  MEDICATIONS  (STANDING):  albuterol    0.083% 2.5 milliGRAM(s) Nebulizer every 6 hours  amLODIPine   Tablet 10 milliGRAM(s) Oral daily  apixaban 5 milliGRAM(s) Enteral Tube every 12 hours  aspirin  chewable 81 milliGRAM(s) Oral daily  atorvastatin 20 milliGRAM(s) Oral at bedtime  carvedilol 25 milliGRAM(s) Oral every 12 hours  chlorhexidine 0.12% Liquid 15 milliLiter(s) Oral Mucosa two times a day  chlorhexidine 2% Cloths 1 Application(s) Topical <User Schedule>  dextrose 10% Bolus 125 milliLiter(s) IV Bolus once  dextrose 5%. 1000 milliLiter(s) (100 mL/Hr) IV Continuous <Continuous>  dextrose 5%. 1000 milliLiter(s) (50 mL/Hr) IV Continuous <Continuous>  dextrose 50% Injectable 25 Gram(s) IV Push once  dextrose 50% Injectable 12.5 Gram(s) IV Push once  epoetin reilly (EPOGEN) Injectable 18648 Unit(s) IV Push <User Schedule>  ferrous    sulfate Liquid 300 milliGRAM(s) Enteral Tube daily  glucagon  Injectable 1 milliGRAM(s) IntraMuscular once  hydrALAZINE 100 milliGRAM(s) Oral three times a day  insulin lispro (ADMELOG) corrective regimen sliding scale   SubCutaneous every 6 hours  insulin NPH human recombinant 4 Unit(s) SubCutaneous every 6 hours  lidocaine   4% Patch 2 Patch Transdermal every 24 hours  pantoprazole  Injectable 40 milliGRAM(s) IV Push every 12 hours  polyethylene glycol 3350 17 Gram(s) Oral daily  senna 2 Tablet(s) Oral at bedtime    MEDICATIONS  (PRN):  acetaminophen   Oral Liquid .. 650 milliGRAM(s) Oral every 6 hours PRN Temp greater or equal to 38C (100.4F), Moderate Pain (4 - 6)  dextrose Oral Gel 15 Gram(s) Oral once PRN Blood Glucose LESS THAN 70 milliGRAM(s)/deciliter  hydrALAZINE Injectable 10 milliGRAM(s) IV Push every 8 hours PRN SBP > 180  sodium chloride 0.9% lock flush 10 milliLiter(s) IV Push every 1 hour PRN Pre/post blood products, medications, blood draw, and to maintain line patency      LABS:    06-13    133<L>  |  94<L>  |  46<H>  ----------------------------<  183<H>  4.5   |  27  |  3.51<H>    Ca    8.7      13 Jun 2024 05:30  Phos  2.7     06-13  Mg     2.40     06-13        Urinalysis Basic - ( 13 Jun 2024 05:30 )    Color: x / Appearance: x / SG: x / pH: x  Gluc: 183 mg/dL / Ketone: x  / Bili: x / Urobili: x   Blood: x / Protein: x / Nitrite: x   Leuk Esterase: x / RBC: x / WBC x   Sq Epi: x / Non Sq Epi: x / Bacteria: x            PAST MEDICAL & SURGICAL HISTORY:  Diabetes      Benign essential HTN      HLD (hyperlipidemia)      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 CHIEF COMPLAINT: Patient is a 73y old  Male who presents with a chief complaint of Unstable angina, abn EKG (14 Jun 2024 07:48)      Interval Events: No acute events overnight     REVIEW OF SYSTEMS:  No pain or resp sx   [x ] All other systems negative          OBJECTIVE:  ICU Vital Signs Last 24 Hrs  T(C): 36.7 (13 Jun 2024 21:00), Max: 36.7 (13 Jun 2024 21:00)  T(F): 98 (13 Jun 2024 21:00), Max: 98 (13 Jun 2024 21:00)  HR: 57 (14 Jun 2024 05:58) (54 - 61)  BP: 132/90 (14 Jun 2024 05:58) (132/90 - 162/56)  BP(mean): 98 (14 Jun 2024 05:58) (80 - 98)  ABP: --  ABP(mean): --  RR: 18 (14 Jun 2024 05:58) (16 - 18)  SpO2: 100% (14 Jun 2024 05:58) (98% - 100%)    O2 Parameters below as of 14 Jun 2024 05:58  Patient On (Oxygen Delivery Method): tracheostomy collar  O2 Flow (L/min): 6  O2 Concentration (%): 28          06-13 @ 07:01  -  06-14 @ 07:00  --------------------------------------------------------  IN: 890 mL / OUT: 0 mL / NET: 890 mL      CAPILLARY BLOOD GLUCOSE      POCT Blood Glucose.: 173 mg/dL (14 Jun 2024 05:48)      HOSPITAL MEDICATIONS:  MEDICATIONS  (STANDING):  albuterol    0.083% 2.5 milliGRAM(s) Nebulizer every 6 hours  amLODIPine   Tablet 10 milliGRAM(s) Oral daily  apixaban 5 milliGRAM(s) Enteral Tube every 12 hours  aspirin  chewable 81 milliGRAM(s) Oral daily  atorvastatin 20 milliGRAM(s) Oral at bedtime  carvedilol 25 milliGRAM(s) Oral every 12 hours  chlorhexidine 0.12% Liquid 15 milliLiter(s) Oral Mucosa two times a day  chlorhexidine 2% Cloths 1 Application(s) Topical <User Schedule>  dextrose 10% Bolus 125 milliLiter(s) IV Bolus once  dextrose 5%. 1000 milliLiter(s) (100 mL/Hr) IV Continuous <Continuous>  dextrose 5%. 1000 milliLiter(s) (50 mL/Hr) IV Continuous <Continuous>  dextrose 50% Injectable 25 Gram(s) IV Push once  dextrose 50% Injectable 12.5 Gram(s) IV Push once  epoetin reilly (EPOGEN) Injectable 64004 Unit(s) IV Push <User Schedule>  ferrous    sulfate Liquid 300 milliGRAM(s) Enteral Tube daily  glucagon  Injectable 1 milliGRAM(s) IntraMuscular once  hydrALAZINE 100 milliGRAM(s) Oral three times a day  insulin lispro (ADMELOG) corrective regimen sliding scale   SubCutaneous every 6 hours  insulin NPH human recombinant 4 Unit(s) SubCutaneous every 6 hours  lidocaine   4% Patch 2 Patch Transdermal every 24 hours  pantoprazole  Injectable 40 milliGRAM(s) IV Push every 12 hours  polyethylene glycol 3350 17 Gram(s) Oral daily  senna 2 Tablet(s) Oral at bedtime    MEDICATIONS  (PRN):  acetaminophen   Oral Liquid .. 650 milliGRAM(s) Oral every 6 hours PRN Temp greater or equal to 38C (100.4F), Moderate Pain (4 - 6)  dextrose Oral Gel 15 Gram(s) Oral once PRN Blood Glucose LESS THAN 70 milliGRAM(s)/deciliter  hydrALAZINE Injectable 10 milliGRAM(s) IV Push every 8 hours PRN SBP > 180  sodium chloride 0.9% lock flush 10 milliLiter(s) IV Push every 1 hour PRN Pre/post blood products, medications, blood draw, and to maintain line patency      LABS:    06-13    133<L>  |  94<L>  |  46<H>  ----------------------------<  183<H>  4.5   |  27  |  3.51<H>    Ca    8.7      13 Jun 2024 05:30  Phos  2.7     06-13  Mg     2.40     06-13        Urinalysis Basic - ( 13 Jun 2024 05:30 )    Color: x / Appearance: x / SG: x / pH: x  Gluc: 183 mg/dL / Ketone: x  / Bili: x / Urobili: x   Blood: x / Protein: x / Nitrite: x   Leuk Esterase: x / RBC: x / WBC x   Sq Epi: x / Non Sq Epi: x / Bacteria: x            PAST MEDICAL & SURGICAL HISTORY:  Diabetes      Benign essential HTN      HLD (hyperlipidemia)      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

## 2024-06-14 NOTE — PROGRESS NOTE ADULT - PROVIDER SPECIALTY LIST ADULT
Cardiology
Gastroenterology
Infectious Disease
Internal Medicine
MICU
MICU
Nephrology
Neurology
Pulmonology
Pulmonology
Rehab Medicine
Rehab Medicine
Surgery
Vascular Surgery
Cardiology
Gastroenterology
Infectious Disease
Internal Medicine
MICU
MICU
Nephrology
Neurology
Pulmonology
Rehab Medicine
Vascular Surgery
Cardiology
Gastroenterology
Infectious Disease
Internal Medicine
Intervent Pulmonology
MICU
Nephrology
Neurology
Pulmonology
Rehab Medicine
Surgery
Vascular Surgery
Gastroenterology
Infectious Disease
Internal Medicine
MICU
Nephrology
Neurology
Pulmonology
Vascular Surgery
Gastroenterology
Internal Medicine
MICU
Nephrology
Neurology
Pulmonology
Vascular Surgery
Neurology
Neurology
Pulmonology
Intervent Pulmonology
Nephrology

## 2024-06-14 NOTE — PROGRESS NOTE ADULT - REASON FOR ADMISSION
Unstable angina, abn EKG

## 2024-06-14 NOTE — PROGRESS NOTE ADULT - NS_MD_PANP_GEN_ALL_CORE

## 2024-06-14 NOTE — H&P ADULT - ASSESSMENT
#Toxic Encepalopathy most likely 2/2 baclofen toxicity, Improving       #GI  - Concern melena 5/27, resolved  - Concern for upper GI Bleed,   - GI consulted, not candidate for endoscopy due to surgical risk  - Continue PPI BID     Oropharyngeal Dysphagia  s/p PEG (5/17)  - Continue feeds  73-year-old male w/ past medical history hypertension, ESRD (HD MWF)  insulin-dependent DM presented 4/29/24 to [facility] 4/29 with hiccups and demand ischemia, was treated with baclofen, developed what appears to be toxic encephalopathy. He then sustained a prolonged, complex hospital course over approx 6 weeks, notable for acute respiratory failure, intubation, sepsis, CRRT, DVT, GIB, trach and PEG placement, He is admitted for acute multidisciplinary rehab on 6/14/24 to [facility].     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  #Hospital Acquired Debility   #Dysphagia  #Gait, ADL, Functional impairments  - Comprehensive Multidisciplinary Rehab, PT/OT/ SLP 3 hr/day 5d/wk  - AC: Eliquis 5 BID for DVT, seen on CTA/P 5/12  - ASA81 qD  - Pain: PRN Tylenol, Lidocaine patch   - Absolute Avoidance of Baclofen   - NPO/Tube Feeds as below    #Acute Hypoxemic, Hypercapnic Respiratory Failure, s/p ETT intubation  #s/p tracheostomy   - Albuterol neb q6h   - Continue TC w/ ATC  - Reportedly tolerating PMV during daytime  - Respiratory to follow in house    #ESRD on HD  #Fistula stenosis   #Anemia   - Continue HD M/W/F per renal   - Access is RUE AVF  - Regular monitoring of electrolytes  - EPOGEN w/ HD  - Iron supplementation, 300 mg daily     #HTN  - Coreg 25 q12h  - Hydralazine 100 mg TID  - Norvasc 10 mg/D  - Isordil would be next agent added    #NSTEMI, Demand Ischemia, resolved  - Initially, troponins mildly elevated  - TTE (4/30): EF 72, normal, no regional wall motion abnormalities   - No need to pursue cath at this time   - 20 mg lipitor at bedtime     #IDDM2, A1c 6.9  - NPH 4u q6h  - FS, ISS TIDAC    #R Common Iliac DVT  - Initially treated with heparin but then developed questionable GIB, transitioned to Eliquis   - IR was consulted, no plan for IVC filter  - Continue Eliquis 5 mg BID     #GI  - Concern melena 5/27, resolved, transfused   - GI consulted, not candidate for endoscopy due to surgical risk  - Concern for upper GI Bleed, GI felt not true bleed  - Continue PPI BID   - MIralax 17g daily  - Senna 2 @ bedtime   - Ducolax suppository daily PRN     #Oropharyngeal Dysphagia  #s/p PEG (5/17)  - Failed green dye 6/1   - Continue feeds: Card Steady 10 cc/hr incr. q6 goal 45cc/hr     #Mood / Cognition:  - Neuropsych evaluation given prolonged and complex hospitalization  - Chart mentions concern for depression w/ possible suicidal ideation     #/Bladder:  - Check urinary status on arrival, possibly anuric     #Diet / Dysphagia:    - Diet: NPO/Tube Feeds  - Ongoing SLP assessment  - Nutrition to follow    #Skin/ Pressure Injury Prevention:  - Assessment on admission   - Incisions:    #Precautions/ Restrictions  - Falls, Aspiration Precautions   - COVID PCR:     --------------------------------------------  Outpatient Follow up:    PENDING DISCHARGE PAPERS    --------------------------------------------      MEDICAL PROGNOSIS: GOOD            REHAB POTENTIAL: GOOD             ESTIMATED DISPOSITION: HOME WITH HOME CARE            ELOS: 10-14 Days   EXPECTED THERAPY:     P.T. 1hr/day       O.T. 1hr/day      S.L.P. 1hr/day     P&O Unnecessary     EXP FREQUENCY: 5 days per 7 day period     PRESCREEN COMPARISON:   I have reviewed the prescreen information and I have found no relevant changes between the preadmission screening and my post admission evaluation     RATIONALE FOR INPATIENT ADMISSION - Patient demonstrates the following: (check all that apply)  [X] Medically appropriate for rehabilitation admission  [X] Has attainable rehab goals with an appropriate initial discharge plan  [X] Has rehabilitation potential (expected to make a significant improvement within a reasonable period of time)   [X] Requires close medical management by a rehab physician, rehab nursing care, Hospitalist and comprehensive interdisciplinary team (including PT, OT, & or SLP, Prosthetics and Orthotics)  73-year-old male w/ past medical history hypertension, ESRD (HD MWF)  insulin-dependent DM presented 4/29/24 to [facility] 4/29 with hiccups and demand ischemia, was treated with baclofen, developed what appears to be toxic encephalopathy. He then sustained a prolonged, complex hospital course over approx 6 weeks, notable for acute respiratory failure, intubation, sepsis, CRRT, DVT, GIB, trach and PEG placement, He is admitted for acute multidisciplinary rehab on 6/14/24 to [facility].     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  #Hospital Acquired Debility   #Dysphagia  #Gait, ADL, Functional impairments  - Comprehensive Multidisciplinary Rehab, PT/OT/ SLP 3 hr/day 5d/wk  - AC: Eliquis 5 BID for DVT, seen on CTA/P 5/12  - ASA81 qD  - Pain: PRN Tylenol, Lidocaine patch   - Absolute Avoidance of Baclofen   - NPO/Tube Feeds as below    #Acute Hypoxemic, Hypercapnic Respiratory Failure, s/p ETT intubation  #s/p tracheostomy   - Albuterol neb q6h   - Continue TC w/ ATC  - Reportedly tolerating PMV during daytime  - Respiratory to follow in house    #ESRD on HD  #Fistula stenosis   #Anemia   - Continue HD M/W/F per renal   - Access is RUE AVF  - Regular monitoring of electrolytes  - EPOGEN w/ HD  - Iron supplementation, 300 mg daily     #HTN  - Coreg 25 q12h  - Hydralazine 100 mg TID  - Norvasc 10 mg/D  - Isordil would be next agent added    #NSTEMI, Demand Ischemia, resolved  - Initially, troponins mildly elevated  - TTE (4/30): EF 72, normal, no regional wall motion abnormalities   - No need to pursue cath at this time   - 20 mg lipitor at bedtime     #IDDM2, A1c 6.9  - NPH 4u q6h  - FS, ISS TIDAC    #R Common Iliac DVT  - Initially treated with heparin but then developed questionable GIB, transitioned to Eliquis   - IR was consulted, no plan for IVC filter  - Continue Eliquis 5 mg BID     #GI  - Concern melena 5/27, resolved, transfused   - GI consulted, not candidate for endoscopy due to surgical risk  - Concern for upper GI Bleed, GI felt not true bleed  - Continue PPI BID   - MIralax 17g daily  - Senna 2 @ bedtime   - Ducolax suppository daily PRN     #Oropharyngeal Dysphagia  #s/p PEG (5/17)  - Failed green dye 6/1   - Continue feeds: Card Steady 10 cc/hr incr. q6 goal 45cc/hr     #Mood / Cognition:  - Neuropsych evaluation given prolonged and complex hospitalization  - Chart mentions concern for depression w/ possible suicidal ideation     #/Bladder:  - Check urinary status on arrival, possibly anuric     #Dysphagia:    - Diet: NPO/Tube Feeds  - Ongoing SLP assessment  - Nutrition to follow    #Skin/ Pressure Injury Prevention:  - Assessment on admission   - Incisions:    #Precautions/ Restrictions  - Falls, Aspiration Precautions   - COVID PCR:     --------------------------------------------dis  Outpatient Follow up:    Prachi Omer  Internal Medicine  [address]  [place] [address]  Phone: [phone]  Fax: [phone]  Established Patient  Follow Up Time: 1 week    Geovani Bravo  Internal Medicine  [address] [place] [address]  Phone: [phone]  Fax: [phone]  Follow Up Time:     Luis F Stallworth  Gastroenterology  [address], [address]  [place] [address]  Phone: [phone]  Fax: [phone]  Follow Up Time:     Adolfo Garcia  Nephrology  [address] [place] [address]  Phone: [phone]  Fax: [phone]  Follow Up Time:     Kellee Mora  Neurology  [address], [address]  [place] [address]  Phone: [phone]  Fax: [phone]  Follow Up Time: 1 week    Jessica Lockett  Vascular Surgery  [address], [address]  [place] [address]  Phone: [phone]  Fax: [phone]  Follow Up Time: 2 weeks      --------------------------------------------      MEDICAL PROGNOSIS: GOOD            REHAB POTENTIAL: GOOD             ESTIMATED DISPOSITION: HOME WITH HOME CARE            ELOS: 10-14 Days   EXPECTED THERAPY:     P.T. 1hr/day       O.T. 1hr/day      S.L.P. 1hr/day     P&O Unnecessary     EXP FREQUENCY: 5 days per 7 day period     PRESCREEN COMPARISON:   I have reviewed the prescreen information and I have found no relevant changes between the preadmission screening and my post admission evaluation     RATIONALE FOR INPATIENT ADMISSION - Patient demonstrates the following: (check all that apply)  [X] Medically appropriate for rehabilitation admission  [X] Has attainable rehab goals with an appropriate initial discharge plan  [X] Has rehabilitation potential (expected to make a significant improvement within a reasonable period of time)   [X] Requires close medical management by a rehab physician, rehab nursing care, Hospitalist and comprehensive interdisciplinary team (including PT, OT, & or SLP, Prosthetics and Orthotics)

## 2024-06-14 NOTE — PROGRESS NOTE ADULT - SUBJECTIVE AND OBJECTIVE BOX
Patient is a 73y old  Male who presents with a chief complaint of Unstable angina, abn EKG (14 Jun 2024 13:45)      SUBJECTIVE / OVERNIGHT EVENTS: dc to acute rehab today    MEDICATIONS  (STANDING):  albuterol    0.083% 2.5 milliGRAM(s) Nebulizer every 6 hours  amLODIPine   Tablet 10 milliGRAM(s) Oral daily  apixaban 5 milliGRAM(s) Enteral Tube every 12 hours  aspirin  chewable 81 milliGRAM(s) Oral daily  atorvastatin 20 milliGRAM(s) Oral at bedtime  carvedilol 25 milliGRAM(s) Oral every 12 hours  chlorhexidine 0.12% Liquid 15 milliLiter(s) Oral Mucosa two times a day  chlorhexidine 2% Cloths 1 Application(s) Topical <User Schedule>  dextrose 10% Bolus 125 milliLiter(s) IV Bolus once  dextrose 5%. 1000 milliLiter(s) (100 mL/Hr) IV Continuous <Continuous>  dextrose 5%. 1000 milliLiter(s) (50 mL/Hr) IV Continuous <Continuous>  dextrose 50% Injectable 25 Gram(s) IV Push once  dextrose 50% Injectable 12.5 Gram(s) IV Push once  epoetin reilly (EPOGEN) Injectable 37207 Unit(s) IV Push <User Schedule>  ferrous    sulfate Liquid 300 milliGRAM(s) Enteral Tube daily  glucagon  Injectable 1 milliGRAM(s) IntraMuscular once  hydrALAZINE 100 milliGRAM(s) Oral three times a day  insulin lispro (ADMELOG) corrective regimen sliding scale   SubCutaneous every 6 hours  insulin NPH human recombinant 4 Unit(s) SubCutaneous every 6 hours  lidocaine   4% Patch 2 Patch Transdermal every 24 hours  pantoprazole  Injectable 40 milliGRAM(s) IV Push every 12 hours  polyethylene glycol 3350 17 Gram(s) Oral daily  senna 2 Tablet(s) Oral at bedtime    MEDICATIONS  (PRN):  acetaminophen   Oral Liquid .. 650 milliGRAM(s) Oral every 6 hours PRN Temp greater or equal to 38C (100.4F), Moderate Pain (4 - 6)  dextrose Oral Gel 15 Gram(s) Oral once PRN Blood Glucose LESS THAN 70 milliGRAM(s)/deciliter  hydrALAZINE Injectable 10 milliGRAM(s) IV Push every 8 hours PRN SBP > 180  sodium chloride 0.9% lock flush 10 milliLiter(s) IV Push every 1 hour PRN Pre/post blood products, medications, blood draw, and to maintain line patency      Vital Signs Last 24 Hrs  T(F): 98.4 (06-14-24 @ 18:43), Max: 98.4 (06-14-24 @ 18:43)  HR: 58 (06-14-24 @ 18:43) (54 - 61)  BP: 160/70 (06-14-24 @ 18:43) (132/90 - 160/70)  RR: 16 (06-14-24 @ 18:43) (16 - 18)  SpO2: 98% (06-14-24 @ 18:43) (97% - 100%)  Telemetry:   CAPILLARY BLOOD GLUCOSE      POCT Blood Glucose.: 242 mg/dL (14 Jun 2024 11:26)  POCT Blood Glucose.: 173 mg/dL (14 Jun 2024 05:48)  POCT Blood Glucose.: 190 mg/dL (13 Jun 2024 23:10)    I&O's Summary    13 Jun 2024 07:01  -  14 Jun 2024 07:00  --------------------------------------------------------  IN: 890 mL / OUT: 0 mL / NET: 890 mL    14 Jun 2024 07:01  -  14 Jun 2024 19:57  --------------------------------------------------------  IN: 600 mL / OUT: 2500 mL / NET: -1900 mL        PHYSICAL EXAM:  GENERAL: NAD, well-developed  HEAD:  Atraumatic, Normocephalic  EYES: EOMI, PERRLA, conjunctiva and sclera clear  NECK: Supple, No JVD  CHEST/LUNG: Clear to auscultation bilaterally; No wheeze  HEART: Regular rate and rhythm; No murmurs, rubs, or gallops  ABDOMEN: Soft, Nontender, Nondistended; Bowel sounds present  EXTREMITIES:  2+ Peripheral Pulses, No clubbing, cyanosis, or edema  PSYCH: AAOx3  NEUROLOGY: non-focal  SKIN: No rashes or lesions    LABS:    06-13    133<L>  |  94<L>  |  46<H>  ----------------------------<  183<H>  4.5   |  27  |  3.51<H>    Ca    8.7      13 Jun 2024 05:30  Phos  2.7     06-13  Mg     2.40     06-13            Urinalysis Basic - ( 13 Jun 2024 05:30 )    Color: x / Appearance: x / SG: x / pH: x  Gluc: 183 mg/dL / Ketone: x  / Bili: x / Urobili: x   Blood: x / Protein: x / Nitrite: x   Leuk Esterase: x / RBC: x / WBC x   Sq Epi: x / Non Sq Epi: x / Bacteria: x        RADIOLOGY & ADDITIONAL TESTS:    Imaging Personally Reviewed:    Consultant(s) Notes Reviewed:      Care Discussed with Consultants/Other Providers:

## 2024-06-14 NOTE — H&P ADULT - NSHPSOCIALHISTORY_GEN_ALL_CORE
Pt lives with family in a house, 4 steps to enter and a flight inside. Prior to admission pt was fully independent in ADLs and ambulation without device. Patient performed active assistive ROM of both UE / LE in functional plains with 10 repetition Smoke denies  drinking denies  rec drugs denies    Pt lives with family in a house, 4 steps to enter and a flight inside. Prior to admission pt was fully independent in ADLs and ambulation without device. Patient performed active assistive ROM of both UE / LE in functional plains with 10 repetition    mobility- max-A.   UBD mod-A.   grooming min-A. Smoke denies  drinking denies  rec drugs denies    Pt lives with family in a house, 4 steps to enter and a flight inside. Prior to admission pt was fully independent in ADLs and ambulation without device. Patient performed active assistive ROM of both UE / LE in functional plains with 10 repetition    mobility- max-A.   UBD mod-A.   grooming min-A.      lives in private house with wife. 4 NIK. 2 floors. Independent PTA.

## 2024-06-14 NOTE — PROGRESS NOTE ADULT - NUTRITIONAL ASSESSMENT
This patient has been assessed with a concern for Malnutrition and has been determined to have a diagnosis/diagnoses of Severe protein-calorie malnutrition and Underweight (BMI < 19).    This patient is being managed with:   Diet NPO after Midnight-     NPO Start Date: 03-Jun-2024   NPO Start Time: 23:59  Except Medications  Entered: Eze  3 2024  2:22PM    Diet NPO with Tube Feed-  Tube Feeding Modality: Gastrostomy  Nepro with Carb Steady (NEPRORTH)  Continuous  Starting Tube Feed Rate {mL per Hour}: 10  Increase Tube Feed Rate by (mL): 10     Every 6 hours  Until Goal Tube Feed Rate (mL per Hour): 45  Banatrol TF     Qty per Day:  1  Entered: May 28 2024 11:38AM  
This patient has been assessed with a concern for Malnutrition and has been determined to have a diagnosis/diagnoses of Severe protein-calorie malnutrition and Underweight (BMI < 19).    This patient is being managed with:   Diet NPO after Midnight-     NPO Start Date: 25-May-2024   NPO Start Time: 23:59  Entered: May 25 2024  4:37PM    Diet NPO with Tube Feed-  Tube Feeding Modality: Gastrostomy  Nepro with Carb Steady (NEPRORTH)  Total Volume for 24 Hours (mL): 1080  Continuous  Starting Tube Feed Rate {mL per Hour}: 20  Increase Tube Feed Rate by (mL): 10     Every 4 hours  Until Goal Tube Feed Rate (mL per Hour): 45  Tube Feed Duration (in Hours): 24  Tube Feed Start Time: 19:30  Estrellita TF     Qty per Day:  1  Entered: May 21 2024  6:37PM  
This patient has been assessed with a concern for Malnutrition and has been determined to have a diagnosis/diagnoses of Severe protein-calorie malnutrition and Underweight (BMI < 19).    This patient is being managed with:   Diet NPO with Tube Feed-  Tube Feeding Modality: Gastrostomy  Nepro with Carb Steady (NEPRORTH)  Continuous  Starting Tube Feed Rate {mL per Hour}: 10  Increase Tube Feed Rate by (mL): 10     Every 6 hours  Until Goal Tube Feed Rate (mL per Hour): 45  Banatrol TF     Qty per Day:  1  Entered: May 28 2024 11:38AM  
This patient has been assessed with a concern for Malnutrition and has been determined to have a diagnosis/diagnoses of Severe protein-calorie malnutrition and Underweight (BMI < 19).    This patient is being managed with:   Diet NPO with Tube Feed-  Tube Feeding Modality: Gastrostomy  Nepro with Carb Steady (NEPRORTH)  Continuous  Starting Tube Feed Rate {mL per Hour}: 10  Until Goal Tube Feed Rate (mL per Hour): 10  Tube Feed Start Time: 13:00  Banatrol TF     Qty per Day:  1  Entered: May 27 2024  1:07PM  
This patient has been assessed with a concern for Malnutrition and has been determined to have a diagnosis/diagnoses of Severe protein-calorie malnutrition and Underweight (BMI < 19).    This patient is being managed with:   Diet NPO with Tube Feed-  Tube Feeding Modality: Gastrostomy  Nepro with Carb Steady (NEPRORTH)  Total Volume for 24 Hours (mL): 1080  Continuous  Starting Tube Feed Rate {mL per Hour}: 20  Increase Tube Feed Rate by (mL): 10     Every 4 hours  Until Goal Tube Feed Rate (mL per Hour): 45  Tube Feed Duration (in Hours): 24  Tube Feed Start Time: 19:30  Banatrol TF     Qty per Day:  1  Entered: May 21 2024  6:37PM  
This patient has been assessed with a concern for Malnutrition and has been determined to have a diagnosis/diagnoses of Severe protein-calorie malnutrition and Underweight (BMI < 19).    This patient is being managed with:   Diet NPO with Tube Feed-  Tube Feeding Modality: Gastrostomy  Nepro with Carb Steady (NEPRORTH)  Total Volume for 24 Hours (mL): 1080  Continuous  Starting Tube Feed Rate {mL per Hour}: 20  Increase Tube Feed Rate by (mL): 10     Every 4 hours  Until Goal Tube Feed Rate (mL per Hour): 45  Tube Feed Duration (in Hours): 24  Tube Feed Start Time: 19:30  Entered: May 17 2024  3:28PM  
This patient has been assessed with a concern for Malnutrition and has been determined to have a diagnosis/diagnoses of Severe protein-calorie malnutrition and Underweight (BMI < 19).    This patient is being managed with:   Diet NPO with Tube Feed-  Tube Feeding Modality: Gastrostomy  Nepro with Carb Steady (NEPRORTH)  Total Volume for 24 Hours (mL): 1080  Continuous  Starting Tube Feed Rate {mL per Hour}: 20  Increase Tube Feed Rate by (mL): 10     Every 4 hours  Until Goal Tube Feed Rate (mL per Hour): 45  Tube Feed Duration (in Hours): 24  Tube Feed Start Time: 19:30  Entered: May 17 2024  3:28PM  
This patient has been assessed with a concern for Malnutrition and has been determined to have a diagnosis/diagnoses of Severe protein-calorie malnutrition and Underweight (BMI < 19).    This patient is being managed with:   Diet NPO with Tube Feed-  Tube Feeding Modality: Gastrostomy  Nepro with Carb Steady (NEPRORTH)  Total Volume for 24 Hours (mL): 1080  Continuous  Starting Tube Feed Rate {mL per Hour}: 45  Until Goal Tube Feed Rate (mL per Hour): 45  Tube Feed Duration (in Hours): 24  Tube Feed Start Time: 19:30  Liquid Protein Supplement     Qty per Day:  3  Entered: May 16 2024  7:28PM    Diet NPO after Midnight-     NPO Start Date: 16-May-2024   NPO Start Time: 23:59  Except Medications  Entered: May 16 2024  6:12PM  
This patient has been assessed with a concern for Malnutrition and has been determined to have a diagnosis/diagnoses of Severe protein-calorie malnutrition and Underweight (BMI < 19).    This patient is being managed with:   Diet NPO with Tube Feed-  Tube Feeding Modality: Orogastric  Nepro with Carb Steady (NEPRORTH)  Total Volume for 24 Hours (mL): 1080  Continuous  Starting Tube Feed Rate {mL per Hour}: 10  Increase Tube Feed Rate by (mL): 35     Every hour  Until Goal Tube Feed Rate (mL per Hour): 45  Tube Feed Duration (in Hours): 24  Tube Feed Start Time: 11:00  Tube Feed Stop Time: 05:00  Liquid Protein Supplement     Qty per Day:  3  Entered: May  6 2024  6:18PM  
This patient has been assessed with a concern for Malnutrition and has been determined to have a diagnosis/diagnoses of Severe protein-calorie malnutrition and Underweight (BMI < 19).    This patient is being managed with:   Diet NPO with Tube Feed-  Tube Feeding Modality: Orogastric  Nepro with Carb Steady (NEPRORTH)  Total Volume for 24 Hours (mL): 1080  Continuous  Starting Tube Feed Rate {mL per Hour}: 10  Increase Tube Feed Rate by (mL): 35     Every hour  Until Goal Tube Feed Rate (mL per Hour): 45  Tube Feed Duration (in Hours): 24  Tube Feed Start Time: 11:00  Tube Feed Stop Time: 05:00  Liquid Protein Supplement     Qty per Day:  3  Entered: May 13 2024  9:38AM  
This patient has been assessed with a concern for Malnutrition and has been determined to have a diagnosis/diagnoses of Severe protein-calorie malnutrition and Underweight (BMI < 19).    This patient is being managed with:   Diet NPO-  Except Medications  Entered: May  2 2024  5:11AM  
This patient has been assessed with a concern for Malnutrition and has been determined to have a diagnosis/diagnoses of Severe protein-calorie malnutrition and Underweight (BMI < 19).    This patient is being managed with:   Diet NPO-  Except Medications  Entered: May 11 2024  5:11AM  
This patient has been assessed with a concern for Malnutrition and has been determined to have a diagnosis/diagnoses of Severe protein-calorie malnutrition and Underweight (BMI < 19).  
This patient has been assessed with a concern for Malnutrition and has been determined to have a diagnosis/diagnoses of Severe protein-calorie malnutrition and Underweight (BMI < 19).    This patient is being managed with:   Diet NPO after Midnight-     NPO Start Date: 03-Jun-2024   NPO Start Time: 23:59  Except Medications  Entered: Eze  3 2024  2:22PM    Diet NPO with Tube Feed-  Tube Feeding Modality: Gastrostomy  Nepro with Carb Steady (NEPRORTH)  Continuous  Starting Tube Feed Rate {mL per Hour}: 10  Increase Tube Feed Rate by (mL): 10     Every 6 hours  Until Goal Tube Feed Rate (mL per Hour): 45  Banatrol TF     Qty per Day:  1  Entered: May 28 2024 11:38AM  
This patient has been assessed with a concern for Malnutrition and has been determined to have a diagnosis/diagnoses of Severe protein-calorie malnutrition and Underweight (BMI < 19).    This patient is being managed with:   Diet NPO after Midnight-     NPO Start Date: 13-May-2024   NPO Start Time: 23:59  Entered: May 13 2024  8:01PM    Diet NPO with Tube Feed-  Tube Feeding Modality: Orogastric  Nepro with Carb Steady (NEPRORTH)  Total Volume for 24 Hours (mL): 1080  Continuous  Starting Tube Feed Rate {mL per Hour}: 10  Increase Tube Feed Rate by (mL): 35     Every hour  Until Goal Tube Feed Rate (mL per Hour): 45  Tube Feed Duration (in Hours): 24  Tube Feed Start Time: 11:00  Tube Feed Stop Time: 05:00  Liquid Protein Supplement     Qty per Day:  3  Entered: May 13 2024  9:38AM  
This patient has been assessed with a concern for Malnutrition and has been determined to have a diagnosis/diagnoses of Severe protein-calorie malnutrition and Underweight (BMI < 19).    This patient is being managed with:   Diet NPO after Midnight-     NPO Start Date: 22-May-2024   NPO Start Time: 23:59  Except Medications  Entered: May 22 2024  4:10PM    Diet NPO after Midnight-     NPO Start Date: 22-May-2024   NPO Start Time: 23:59  Entered: May 22 2024 10:44AM    Diet NPO with Tube Feed-  Tube Feeding Modality: Gastrostomy  Nepro with Carb Steady (NEPRORTH)  Total Volume for 24 Hours (mL): 1080  Continuous  Starting Tube Feed Rate {mL per Hour}: 20  Increase Tube Feed Rate by (mL): 10     Every 4 hours  Until Goal Tube Feed Rate (mL per Hour): 45  Tube Feed Duration (in Hours): 24  Tube Feed Start Time: 19:30  Banatrol TF     Qty per Day:  1  Entered: May 21 2024  6:37PM  
This patient has been assessed with a concern for Malnutrition and has been determined to have a diagnosis/diagnoses of Severe protein-calorie malnutrition and Underweight (BMI < 19).    This patient is being managed with:   Diet NPO after Midnight-     NPO Start Date: 22-May-2024   NPO Start Time: 23:59  Except Medications  Entered: May 22 2024  4:10PM    Diet NPO with Tube Feed-  Tube Feeding Modality: Gastrostomy  Nepro with Carb Steady (NEPRORTH)  Total Volume for 24 Hours (mL): 1080  Continuous  Starting Tube Feed Rate {mL per Hour}: 20  Increase Tube Feed Rate by (mL): 10     Every 4 hours  Until Goal Tube Feed Rate (mL per Hour): 45  Tube Feed Duration (in Hours): 24  Tube Feed Start Time: 19:30  Estrellita TF     Qty per Day:  1  Entered: May 21 2024  6:37PM  
This patient has been assessed with a concern for Malnutrition and has been determined to have a diagnosis/diagnoses of Severe protein-calorie malnutrition and Underweight (BMI < 19).    This patient is being managed with:   Diet NPO after Midnight-     NPO Start Date: 23-May-2024   NPO Start Time: 23:59  Except Medications  Entered: May 23 2024  3:49PM    Diet NPO with Tube Feed-  Tube Feeding Modality: Gastrostomy  Nepro with Carb Steady (NEPRORTH)  Total Volume for 24 Hours (mL): 1080  Continuous  Starting Tube Feed Rate {mL per Hour}: 20  Increase Tube Feed Rate by (mL): 10     Every 4 hours  Until Goal Tube Feed Rate (mL per Hour): 45  Tube Feed Duration (in Hours): 24  Tube Feed Start Time: 19:30  Estrellita TF     Qty per Day:  1  Entered: May 21 2024  6:37PM  
This patient has been assessed with a concern for Malnutrition and has been determined to have a diagnosis/diagnoses of Severe protein-calorie malnutrition and Underweight (BMI < 19).    This patient is being managed with:   Diet NPO with Tube Feed-  Tube Feeding Modality: Gastrostomy  Nepro with Carb Steady (NEPRORTH)  Continuous  Starting Tube Feed Rate {mL per Hour}: 10  Increase Tube Feed Rate by (mL): 10     Every 6 hours  Until Goal Tube Feed Rate (mL per Hour): 45  Banatrol TF     Qty per Day:  1  Entered: May 28 2024 11:38AM  
This patient has been assessed with a concern for Malnutrition and has been determined to have a diagnosis/diagnoses of Severe protein-calorie malnutrition and Underweight (BMI < 19).    This patient is being managed with:   Diet NPO with Tube Feed-  Tube Feeding Modality: Gastrostomy  Nepro with Carb Steady (NEPRORTH)  Continuous  Starting Tube Feed Rate {mL per Hour}: 10  Until Goal Tube Feed Rate (mL per Hour): 10  Tube Feed Start Time: 13:00  Banatrol TF     Qty per Day:  1  Entered: May 27 2024  1:07PM  
This patient has been assessed with a concern for Malnutrition and has been determined to have a diagnosis/diagnoses of Severe protein-calorie malnutrition and Underweight (BMI < 19).    This patient is being managed with:   Diet NPO with Tube Feed-  Tube Feeding Modality: Gastrostomy  Nepro with Carb Steady (NEPRORTH)  Total Volume for 24 Hours (mL): 1080  Continuous  Starting Tube Feed Rate {mL per Hour}: 20  Increase Tube Feed Rate by (mL): 10     Every 4 hours  Until Goal Tube Feed Rate (mL per Hour): 45  Tube Feed Duration (in Hours): 24  Tube Feed Start Time: 19:30  Banatrol TF     Qty per Day:  1  Entered: May 21 2024  6:37PM  
This patient has been assessed with a concern for Malnutrition and has been determined to have a diagnosis/diagnoses of Severe protein-calorie malnutrition and Underweight (BMI < 19).    This patient is being managed with:   Diet NPO with Tube Feed-  Tube Feeding Modality: Gastrostomy  Nepro with Carb Steady (NEPRORTH)  Total Volume for 24 Hours (mL): 1080  Continuous  Starting Tube Feed Rate {mL per Hour}: 20  Increase Tube Feed Rate by (mL): 10     Every 4 hours  Until Goal Tube Feed Rate (mL per Hour): 45  Tube Feed Duration (in Hours): 24  Tube Feed Start Time: 19:30  Banatrol TF     Qty per Day:  1  Entered: May 21 2024  6:37PM  
This patient has been assessed with a concern for Malnutrition and has been determined to have a diagnosis/diagnoses of Severe protein-calorie malnutrition and Underweight (BMI < 19).    This patient is being managed with:   Diet NPO with Tube Feed-  Tube Feeding Modality: Gastrostomy  Nepro with Carb Steady (NEPRORTH)  Total Volume for 24 Hours (mL): 1080  Continuous  Starting Tube Feed Rate {mL per Hour}: 20  Increase Tube Feed Rate by (mL): 10     Every 4 hours  Until Goal Tube Feed Rate (mL per Hour): 45  Tube Feed Duration (in Hours): 24  Tube Feed Start Time: 19:30  Entered: May 17 2024  3:28PM  
This patient has been assessed with a concern for Malnutrition and has been determined to have a diagnosis/diagnoses of Severe protein-calorie malnutrition and Underweight (BMI < 19).    This patient is being managed with:   Diet NPO with Tube Feed-  Tube Feeding Modality: Gastrostomy  Nepro with Carb Steady (NEPRORTH)  Total Volume for 24 Hours (mL): 1080  Continuous  Starting Tube Feed Rate {mL per Hour}: 20  Increase Tube Feed Rate by (mL): 10     Every 4 hours  Until Goal Tube Feed Rate (mL per Hour): 45  Tube Feed Duration (in Hours): 24  Tube Feed Start Time: 19:30  Entered: May 17 2024  3:28PM  
This patient has been assessed with a concern for Malnutrition and has been determined to have a diagnosis/diagnoses of Severe protein-calorie malnutrition and Underweight (BMI < 19).    This patient is being managed with:   Diet NPO with Tube Feed-  Tube Feeding Modality: Orogastric  Nepro with Carb Steady (NEPRORTH)  Total Volume for 24 Hours (mL): 1080  Continuous  Starting Tube Feed Rate {mL per Hour}: 10  Increase Tube Feed Rate by (mL): 35     Every hour  Until Goal Tube Feed Rate (mL per Hour): 45  Tube Feed Duration (in Hours): 24  Tube Feed Start Time: 11:00  Tube Feed Stop Time: 05:00  Liquid Protein Supplement     Qty per Day:  3  Entered: May  6 2024  6:18PM  
This patient has been assessed with a concern for Malnutrition and has been determined to have a diagnosis/diagnoses of Severe protein-calorie malnutrition and Underweight (BMI < 19).    This patient is being managed with:   Diet NPO with Tube Feed-  Tube Feeding Modality: Orogastric  Nepro with Carb Steady (NEPRORTH)  Total Volume for 24 Hours (mL): 240  Continuous  Starting Tube Feed Rate {mL per Hour}: 10  Until Goal Tube Feed Rate (mL per Hour): 10  Tube Feed Duration (in Hours): 24  Tube Feed Start Time: 11:00  Tube Feed Stop Time: 05:00  Entered: May  4 2024  6:11PM  
This patient has been assessed with a concern for Malnutrition and has been determined to have a diagnosis/diagnoses of Severe protein-calorie malnutrition and Underweight (BMI < 19).    This patient is being managed with:   Diet NPO with Tube Feed-  Tube Feeding Modality: Orogastric  Nepro with Carb Steady (NEPRORTH)  Total Volume for 24 Hours (mL): 240  Continuous  Starting Tube Feed Rate {mL per Hour}: 10  Until Goal Tube Feed Rate (mL per Hour): 10  Tube Feed Duration (in Hours): 24  Tube Feed Start Time: 11:00  Tube Feed Stop Time: 05:00  Entered: May  5 2024 11:22AM  
This patient has been assessed with a concern for Malnutrition and has been determined to have a diagnosis/diagnoses of Severe protein-calorie malnutrition and Underweight (BMI < 19).    This patient is being managed with:   Diet NPO-  Except Medications  Entered: May  2 2024  5:11AM  
This patient has been assessed with a concern for Malnutrition and has been determined to have a diagnosis/diagnoses of Severe protein-calorie malnutrition and Underweight (BMI < 19).    This patient is being managed with:   Diet NPO-  Except Medications  Entered: May  2 2024  5:11AM  
This patient has been assessed with a concern for Malnutrition and has been determined to have a diagnosis/diagnoses of Severe protein-calorie malnutrition and Underweight (BMI < 19).    This patient is being managed with:   Diet NPO-  Except Medications  Entered: May 11 2024  5:11AM  
This patient has been assessed with a concern for Malnutrition and has been determined to have a diagnosis/diagnoses of Severe protein-calorie malnutrition and Underweight (BMI < 19).    This patient is being managed with:   Diet NPO-  NPO for Procedure/Test     NPO Start Date: 25-May-2024   NPO Start Time: 00:00  Except Medications  Entered: May 25 2024 12:25AM    Diet NPO with Tube Feed-  Tube Feeding Modality: Gastrostomy  Nepro with Carb Steady (NEPRORTH)  Total Volume for 24 Hours (mL): 1080  Continuous  Starting Tube Feed Rate {mL per Hour}: 20  Increase Tube Feed Rate by (mL): 10     Every 4 hours  Until Goal Tube Feed Rate (mL per Hour): 45  Tube Feed Duration (in Hours): 24  Tube Feed Start Time: 19:30  Banatrol TF     Qty per Day:  1  Entered: May 21 2024  6:37PM  
This patient has been assessed with a concern for Malnutrition and has been determined to have a diagnosis/diagnoses of Severe protein-calorie malnutrition and Underweight (BMI < 19).    This patient is being managed with:   Diet NPO after Midnight-     NPO Start Date: 23-May-2024   NPO Start Time: 23:59  Except Medications  Entered: May 23 2024  3:49PM    Diet NPO with Tube Feed-  Tube Feeding Modality: Gastrostomy  Nepro with Carb Steady (NEPRORTH)  Total Volume for 24 Hours (mL): 1080  Continuous  Starting Tube Feed Rate {mL per Hour}: 20  Increase Tube Feed Rate by (mL): 10     Every 4 hours  Until Goal Tube Feed Rate (mL per Hour): 45  Tube Feed Duration (in Hours): 24  Tube Feed Start Time: 19:30  Estrellita TF     Qty per Day:  1  Entered: May 21 2024  6:37PM  
This patient has been assessed with a concern for Malnutrition and has been determined to have a diagnosis/diagnoses of Severe protein-calorie malnutrition and Underweight (BMI < 19).    This patient is being managed with:   Diet NPO with Tube Feed-  Tube Feeding Modality: Gastrostomy  Nepro with Carb Steady (NEPRORTH)  Continuous  Starting Tube Feed Rate {mL per Hour}: 10  Increase Tube Feed Rate by (mL): 10     Every 6 hours  Until Goal Tube Feed Rate (mL per Hour): 45  Banatrol TF     Qty per Day:  1  Entered: May 28 2024 11:38AM  
This patient has been assessed with a concern for Malnutrition and has been determined to have a diagnosis/diagnoses of Severe protein-calorie malnutrition and Underweight (BMI < 19).    This patient is being managed with:   Diet NPO with Tube Feed-  Tube Feeding Modality: Gastrostomy  Nepro with Carb Steady (NEPRORTH)  Continuous  Starting Tube Feed Rate {mL per Hour}: 10  Until Goal Tube Feed Rate (mL per Hour): 10  Tube Feed Start Time: 13:00  Banatrol TF     Qty per Day:  1  Entered: May 27 2024  1:07PM  
This patient has been assessed with a concern for Malnutrition and has been determined to have a diagnosis/diagnoses of Severe protein-calorie malnutrition and Underweight (BMI < 19).    This patient is being managed with:   Diet NPO with Tube Feed-  Tube Feeding Modality: Gastrostomy  Nepro with Carb Steady (NEPRORTH)  Total Volume for 24 Hours (mL): 1080  Continuous  Starting Tube Feed Rate {mL per Hour}: 20  Increase Tube Feed Rate by (mL): 10     Every 4 hours  Until Goal Tube Feed Rate (mL per Hour): 45  Tube Feed Duration (in Hours): 24  Tube Feed Start Time: 19:30  Banatrol TF     Qty per Day:  1  Entered: May 21 2024  6:37PM  
This patient has been assessed with a concern for Malnutrition and has been determined to have a diagnosis/diagnoses of Severe protein-calorie malnutrition and Underweight (BMI < 19).    This patient is being managed with:   Diet NPO with Tube Feed-  Tube Feeding Modality: Gastrostomy  Nepro with Carb Steady (NEPRORTH)  Total Volume for 24 Hours (mL): 1080  Continuous  Starting Tube Feed Rate {mL per Hour}: 20  Increase Tube Feed Rate by (mL): 10     Every 4 hours  Until Goal Tube Feed Rate (mL per Hour): 45  Tube Feed Duration (in Hours): 24  Tube Feed Start Time: 19:30  Entered: May 17 2024  3:28PM  
This patient has been assessed with a concern for Malnutrition and has been determined to have a diagnosis/diagnoses of Severe protein-calorie malnutrition and Underweight (BMI < 19).    This patient is being managed with:   Diet NPO with Tube Feed-  Tube Feeding Modality: Gastrostomy  Nepro with Carb Steady (NEPRORTH)  Total Volume for 24 Hours (mL): 1080  Continuous  Starting Tube Feed Rate {mL per Hour}: 45  Until Goal Tube Feed Rate (mL per Hour): 45  Tube Feed Duration (in Hours): 24  Tube Feed Start Time: 19:30  Liquid Protein Supplement     Qty per Day:  3  Entered: May 16 2024  7:28PM    Diet NPO after Midnight-     NPO Start Date: 16-May-2024   NPO Start Time: 23:59  Except Medications  Entered: May 16 2024  6:12PM  
This patient has been assessed with a concern for Malnutrition and has been determined to have a diagnosis/diagnoses of Severe protein-calorie malnutrition and Underweight (BMI < 19).    This patient is being managed with:   Diet NPO with Tube Feed-  Tube Feeding Modality: Gastrostomy  Nepro with Carb Steady (NEPRORTH)  Total Volume for 24 Hours (mL): 1080  Continuous  Starting Tube Feed Rate {mL per Hour}: 45  Until Goal Tube Feed Rate (mL per Hour): 45  Tube Feed Duration (in Hours): 24  Tube Feed Start Time: 19:30  Liquid Protein Supplement     Qty per Day:  3  Entered: May 16 2024  7:28PM    Diet NPO after Midnight-     NPO Start Date: 16-May-2024   NPO Start Time: 23:59  Except Medications  Entered: May 16 2024  6:12PM  
This patient has been assessed with a concern for Malnutrition and has been determined to have a diagnosis/diagnoses of Severe protein-calorie malnutrition and Underweight (BMI < 19).    This patient is being managed with:   Diet NPO with Tube Feed-  Tube Feeding Modality: Orogastric  Nepro with Carb Steady (NEPRORTH)  Total Volume for 24 Hours (mL): 1080  Continuous  Starting Tube Feed Rate {mL per Hour}: 10  Increase Tube Feed Rate by (mL): 35     Every hour  Until Goal Tube Feed Rate (mL per Hour): 45  Tube Feed Duration (in Hours): 24  Tube Feed Start Time: 11:00  Tube Feed Stop Time: 05:00  Liquid Protein Supplement     Qty per Day:  3  Entered: May  6 2024  6:18PM  
This patient has been assessed with a concern for Malnutrition and has been determined to have a diagnosis/diagnoses of Severe protein-calorie malnutrition and Underweight (BMI < 19).    This patient is being managed with:   Diet NPO with Tube Feed-  Tube Feeding Modality: Orogastric  Nepro with Carb Steady (NEPRORTH)  Total Volume for 24 Hours (mL): 240  Continuous  Starting Tube Feed Rate {mL per Hour}: 10  Until Goal Tube Feed Rate (mL per Hour): 10  Tube Feed Duration (in Hours): 24  Tube Feed Start Time: 11:00  Tube Feed Stop Time: 05:00  Entered: May  4 2024  6:11PM  
This patient has been assessed with a concern for Malnutrition and has been determined to have a diagnosis/diagnoses of Severe protein-calorie malnutrition and Underweight (BMI < 19).    This patient is being managed with:   Diet NPO with Tube Feed-  Tube Feeding Modality: Orogastric  Nepro with Carb Steady (NEPRORTH)  Total Volume for 24 Hours (mL): 240  Continuous  Starting Tube Feed Rate {mL per Hour}: 10  Until Goal Tube Feed Rate (mL per Hour): 10  Tube Feed Duration (in Hours): 24  Tube Feed Start Time: 11:00  Tube Feed Stop Time: 05:00  Entered: May  5 2024 11:22AM  
This patient has been assessed with a concern for Malnutrition and has been determined to have a diagnosis/diagnoses of Severe protein-calorie malnutrition and Underweight (BMI < 19).    This patient is being managed with:   Diet NPO-  Entered: May 26 2024  9:42PM  
This patient has been assessed with a concern for Malnutrition and has been determined to have a diagnosis/diagnoses of Severe protein-calorie malnutrition and Underweight (BMI < 19).    This patient is being managed with:   Diet NPO-  Except Medications  Entered: May  2 2024  5:11AM
This patient has been assessed with a concern for Malnutrition and has been determined to have a diagnosis/diagnoses of Severe protein-calorie malnutrition and Underweight (BMI < 19).    This patient is being managed with:   Diet NPO-  Except Medications  Entered: May 11 2024  5:11AM  
This patient has been assessed with a concern for Malnutrition and has been determined to have a diagnosis/diagnoses of Severe protein-calorie malnutrition and Underweight (BMI < 19).    This patient is being managed with:   Diet Renal Restrictions-  For patients receiving Renal Replacement - No Protein Restr No Conc K No Conc Phos Low Sodium  Consistent Carbohydrate {Evening Snack} (CSTCHOSN)  Caffeine Free (NOCAFF)  Entered: Apr 30 2024  4:31PM  
This patient has been assessed with a concern for Malnutrition and has been determined to have a diagnosis/diagnoses of Severe protein-calorie malnutrition and Underweight (BMI < 19).    This patient is being managed with:   Diet NPO after Midnight-     NPO Start Date: 22-May-2024   NPO Start Time: 23:59  Except Medications  Entered: May 22 2024  4:10PM    Diet NPO with Tube Feed-  Tube Feeding Modality: Gastrostomy  Nepro with Carb Steady (NEPRORTH)  Total Volume for 24 Hours (mL): 1080  Continuous  Starting Tube Feed Rate {mL per Hour}: 20  Increase Tube Feed Rate by (mL): 10     Every 4 hours  Until Goal Tube Feed Rate (mL per Hour): 45  Tube Feed Duration (in Hours): 24  Tube Feed Start Time: 19:30  Estrellita TF     Qty per Day:  1  Entered: May 21 2024  6:37PM  
This patient has been assessed with a concern for Malnutrition and has been determined to have a diagnosis/diagnoses of Severe protein-calorie malnutrition and Underweight (BMI < 19).    This patient is being managed with:   Diet NPO with Tube Feed-  Tube Feeding Modality: Gastrostomy  Nepro with Carb Steady (NEPRORTH)  Continuous  Starting Tube Feed Rate {mL per Hour}: 10  Increase Tube Feed Rate by (mL): 10     Every 6 hours  Until Goal Tube Feed Rate (mL per Hour): 45  Banatrol TF     Qty per Day:  1  Entered: May 28 2024 11:38AM  
This patient has been assessed with a concern for Malnutrition and has been determined to have a diagnosis/diagnoses of Severe protein-calorie malnutrition and Underweight (BMI < 19).    This patient is being managed with:   Diet NPO with Tube Feed-  Tube Feeding Modality: Gastrostomy  Nepro with Carb Steady (NEPRORTH)  Continuous  Starting Tube Feed Rate {mL per Hour}: 10  Until Goal Tube Feed Rate (mL per Hour): 10  Tube Feed Start Time: 13:00  Banatrol TF     Qty per Day:  1  Entered: May 27 2024  1:07PM  
This patient has been assessed with a concern for Malnutrition and has been determined to have a diagnosis/diagnoses of Severe protein-calorie malnutrition and Underweight (BMI < 19).    This patient is being managed with:   Diet NPO with Tube Feed-  Tube Feeding Modality: Gastrostomy  Nepro with Carb Steady (NEPRORTH)  Total Volume for 24 Hours (mL): 1080  Continuous  Starting Tube Feed Rate {mL per Hour}: 20  Increase Tube Feed Rate by (mL): 10     Every 4 hours  Until Goal Tube Feed Rate (mL per Hour): 45  Tube Feed Duration (in Hours): 24  Tube Feed Start Time: 19:30  Banatrol TF     Qty per Day:  1  Entered: May 21 2024  6:37PM  
This patient has been assessed with a concern for Malnutrition and has been determined to have a diagnosis/diagnoses of Severe protein-calorie malnutrition and Underweight (BMI < 19).    This patient is being managed with:   Diet NPO with Tube Feed-  Tube Feeding Modality: Gastrostomy  Nepro with Carb Steady (NEPRORTH)  Total Volume for 24 Hours (mL): 1080  Continuous  Starting Tube Feed Rate {mL per Hour}: 20  Increase Tube Feed Rate by (mL): 10     Every 4 hours  Until Goal Tube Feed Rate (mL per Hour): 45  Tube Feed Duration (in Hours): 24  Tube Feed Start Time: 19:30  Banatrol TF     Qty per Day:  1  Entered: May 21 2024  6:37PM  
This patient has been assessed with a concern for Malnutrition and has been determined to have a diagnosis/diagnoses of Severe protein-calorie malnutrition and Underweight (BMI < 19).    This patient is being managed with:   Diet NPO with Tube Feed-  Tube Feeding Modality: Gastrostomy  Nepro with Carb Steady (NEPRORTH)  Total Volume for 24 Hours (mL): 1080  Continuous  Starting Tube Feed Rate {mL per Hour}: 20  Increase Tube Feed Rate by (mL): 10     Every 4 hours  Until Goal Tube Feed Rate (mL per Hour): 45  Tube Feed Duration (in Hours): 24  Tube Feed Start Time: 19:30  Entered: May 17 2024  3:28PM  
This patient has been assessed with a concern for Malnutrition and has been determined to have a diagnosis/diagnoses of Severe protein-calorie malnutrition and Underweight (BMI < 19).    This patient is being managed with:   Diet NPO with Tube Feed-  Tube Feeding Modality: Orogastric  Nepro with Carb Steady (NEPRORTH)  Total Volume for 24 Hours (mL): 1080  Continuous  Starting Tube Feed Rate {mL per Hour}: 10  Increase Tube Feed Rate by (mL): 35     Every hour  Until Goal Tube Feed Rate (mL per Hour): 45  Tube Feed Duration (in Hours): 24  Tube Feed Start Time: 11:00  Tube Feed Stop Time: 05:00  Liquid Protein Supplement     Qty per Day:  3  Entered: May  6 2024  6:18PM  
This patient has been assessed with a concern for Malnutrition and has been determined to have a diagnosis/diagnoses of Severe protein-calorie malnutrition and Underweight (BMI < 19).    This patient is being managed with:   Diet NPO with Tube Feed-  Tube Feeding Modality: Orogastric  Nepro with Carb Steady (NEPRORTH)  Total Volume for 24 Hours (mL): 1080  Continuous  Starting Tube Feed Rate {mL per Hour}: 10  Increase Tube Feed Rate by (mL): 35     Every hour  Until Goal Tube Feed Rate (mL per Hour): 45  Tube Feed Duration (in Hours): 24  Tube Feed Start Time: 11:00  Tube Feed Stop Time: 05:00  Liquid Protein Supplement     Qty per Day:  3  Entered: May 13 2024  9:38AM  
This patient has been assessed with a concern for Malnutrition and has been determined to have a diagnosis/diagnoses of Severe protein-calorie malnutrition and Underweight (BMI < 19).    This patient is being managed with:   Diet NPO with Tube Feed-  Tube Feeding Modality: Orogastric  Nepro with Carb Steady (NEPRORTH)  Total Volume for 24 Hours (mL): 240  Continuous  Starting Tube Feed Rate {mL per Hour}: 10  Until Goal Tube Feed Rate (mL per Hour): 10  Tube Feed Duration (in Hours): 24  Tube Feed Start Time: 11:00  Tube Feed Stop Time: 05:00  Entered: May  4 2024  6:11PM  
This patient has been assessed with a concern for Malnutrition and has been determined to have a diagnosis/diagnoses of Severe protein-calorie malnutrition and Underweight (BMI < 19).    This patient is being managed with:   Diet NPO-  Entered: May 26 2024  9:42PM  
This patient has been assessed with a concern for Malnutrition and has been determined to have a diagnosis/diagnoses of Severe protein-calorie malnutrition and Underweight (BMI < 19).    This patient is being managed with:   Diet NPO-  Except Medications  Entered: May  2 2024  5:11AM  
This patient has been assessed with a concern for Malnutrition and has been determined to have a diagnosis/diagnoses of Severe protein-calorie malnutrition and Underweight (BMI < 19).    This patient is being managed with:   Diet NPO-  Except Medications  Entered: May 11 2024  5:11AM  
This patient has been assessed with a concern for Malnutrition and has been determined to have a diagnosis/diagnoses of Severe protein-calorie malnutrition and Underweight (BMI < 19).    This patient is being managed with:   Diet NPO-  NPO for Procedure/Test     NPO Start Date: 25-May-2024   NPO Start Time: 00:00  Except Medications  Entered: May 25 2024 12:25AM    Diet NPO with Tube Feed-  Tube Feeding Modality: Gastrostomy  Nepro with Carb Steady (NEPRORTH)  Total Volume for 24 Hours (mL): 1080  Continuous  Starting Tube Feed Rate {mL per Hour}: 20  Increase Tube Feed Rate by (mL): 10     Every 4 hours  Until Goal Tube Feed Rate (mL per Hour): 45  Tube Feed Duration (in Hours): 24  Tube Feed Start Time: 19:30  Banatrol TF     Qty per Day:  1  Entered: May 21 2024  6:37PM  
This patient has been assessed with a concern for Malnutrition and has been determined to have a diagnosis/diagnoses of Severe protein-calorie malnutrition and Underweight (BMI < 19).    This patient is being managed with:   Diet NPO-  NPO for Procedure/Test     NPO Start Date: 25-May-2024   NPO Start Time: 00:00  Except Medications  Entered: May 25 2024 12:25AM    Diet NPO with Tube Feed-  Tube Feeding Modality: Gastrostomy  Nepro with Carb Steady (NEPRORTH)  Total Volume for 24 Hours (mL): 1080  Continuous  Starting Tube Feed Rate {mL per Hour}: 20  Increase Tube Feed Rate by (mL): 10     Every 4 hours  Until Goal Tube Feed Rate (mL per Hour): 45  Tube Feed Duration (in Hours): 24  Tube Feed Start Time: 19:30  Banatrol TF     Qty per Day:  1  Entered: May 21 2024  6:37PM  
This patient has been assessed with a concern for Malnutrition and has been determined to have a diagnosis/diagnoses of Underweight (BMI < 19) and Severe protein-calorie malnutrition.    This patient is being managed with:   Diet NPO with Tube Feed-  Tube Feeding Modality: Gastrostomy  Nepro with Carb Steady (NEPRORTH)  Continuous  Starting Tube Feed Rate {mL per Hour}: 10  Increase Tube Feed Rate by (mL): 10     Every 6 hours  Until Goal Tube Feed Rate (mL per Hour): 45  Banatrol TF     Qty per Day:  1  Entered: May 28 2024 11:38AM  
This patient has been assessed with a concern for Malnutrition and has been determined to have a diagnosis/diagnoses of Severe protein-calorie malnutrition and Underweight (BMI < 19).    This patient is being managed with:   Diet NPO after Midnight-     NPO Start Date: 03-Jun-2024   NPO Start Time: 23:59  Except Medications  Entered: Eze  3 2024  2:22PM    Diet NPO with Tube Feed-  Tube Feeding Modality: Gastrostomy  Nepro with Carb Steady (NEPRORTH)  Continuous  Starting Tube Feed Rate {mL per Hour}: 10  Increase Tube Feed Rate by (mL): 10     Every 6 hours  Until Goal Tube Feed Rate (mL per Hour): 45  Banatrol TF     Qty per Day:  1  Entered: May 28 2024 11:38AM  
This patient has been assessed with a concern for Malnutrition and has been determined to have a diagnosis/diagnoses of Severe protein-calorie malnutrition and Underweight (BMI < 19).    This patient is being managed with:   Diet NPO after Midnight-     NPO Start Date: 13-May-2024   NPO Start Time: 23:59  Entered: May 13 2024  8:01PM    Diet NPO with Tube Feed-  Tube Feeding Modality: Orogastric  Nepro with Carb Steady (NEPRORTH)  Total Volume for 24 Hours (mL): 1080  Continuous  Starting Tube Feed Rate {mL per Hour}: 10  Increase Tube Feed Rate by (mL): 35     Every hour  Until Goal Tube Feed Rate (mL per Hour): 45  Tube Feed Duration (in Hours): 24  Tube Feed Start Time: 11:00  Tube Feed Stop Time: 05:00  Liquid Protein Supplement     Qty per Day:  3  Entered: May 13 2024  9:38AM  
This patient has been assessed with a concern for Malnutrition and has been determined to have a diagnosis/diagnoses of Severe protein-calorie malnutrition and Underweight (BMI < 19).    This patient is being managed with:   Diet NPO after Midnight-     NPO Start Date: 13-May-2024   NPO Start Time: 23:59  Entered: May 13 2024  8:01PM    Diet NPO with Tube Feed-  Tube Feeding Modality: Orogastric  Nepro with Carb Steady (NEPRORTH)  Total Volume for 24 Hours (mL): 1080  Continuous  Starting Tube Feed Rate {mL per Hour}: 10  Increase Tube Feed Rate by (mL): 35     Every hour  Until Goal Tube Feed Rate (mL per Hour): 45  Tube Feed Duration (in Hours): 24  Tube Feed Start Time: 11:00  Tube Feed Stop Time: 05:00  Liquid Protein Supplement     Qty per Day:  3  Entered: May 13 2024  9:38AM  
This patient has been assessed with a concern for Malnutrition and has been determined to have a diagnosis/diagnoses of Severe protein-calorie malnutrition and Underweight (BMI < 19).    This patient is being managed with:   Diet NPO after Midnight-     NPO Start Date: 23-May-2024   NPO Start Time: 23:59  Except Medications  Entered: May 23 2024  3:49PM    Diet NPO with Tube Feed-  Tube Feeding Modality: Gastrostomy  Nepro with Carb Steady (NEPRORTH)  Total Volume for 24 Hours (mL): 1080  Continuous  Starting Tube Feed Rate {mL per Hour}: 20  Increase Tube Feed Rate by (mL): 10     Every 4 hours  Until Goal Tube Feed Rate (mL per Hour): 45  Tube Feed Duration (in Hours): 24  Tube Feed Start Time: 19:30  Estrellita TF     Qty per Day:  1  Entered: May 21 2024  6:37PM  
This patient has been assessed with a concern for Malnutrition and has been determined to have a diagnosis/diagnoses of Severe protein-calorie malnutrition and Underweight (BMI < 19).    This patient is being managed with:   Diet NPO with Tube Feed-  Tube Feeding Modality: Gastrostomy  Nepro with Carb Steady (NEPRORTH)  Continuous  Starting Tube Feed Rate {mL per Hour}: 10  Increase Tube Feed Rate by (mL): 10     Every 6 hours  Until Goal Tube Feed Rate (mL per Hour): 45  Banatrol TF     Qty per Day:  1  Entered: May 28 2024 11:38AM  
This patient has been assessed with a concern for Malnutrition and has been determined to have a diagnosis/diagnoses of Severe protein-calorie malnutrition and Underweight (BMI < 19).    This patient is being managed with:   Diet NPO with Tube Feed-  Tube Feeding Modality: Gastrostomy  Nepro with Carb Steady (NEPRORTH)  Continuous  Starting Tube Feed Rate {mL per Hour}: 10  Until Goal Tube Feed Rate (mL per Hour): 10  Tube Feed Start Time: 13:00  Banatrol TF     Qty per Day:  1  Entered: May 27 2024  1:07PM  
This patient has been assessed with a concern for Malnutrition and has been determined to have a diagnosis/diagnoses of Severe protein-calorie malnutrition and Underweight (BMI < 19).    This patient is being managed with:   Diet NPO with Tube Feed-  Tube Feeding Modality: Gastrostomy  Nepro with Carb Steady (NEPRORTH)  Total Volume for 24 Hours (mL): 1080  Continuous  Starting Tube Feed Rate {mL per Hour}: 20  Increase Tube Feed Rate by (mL): 10     Every 4 hours  Until Goal Tube Feed Rate (mL per Hour): 45  Tube Feed Duration (in Hours): 24  Tube Feed Start Time: 19:30  Banatrol TF     Qty per Day:  1  Entered: May 21 2024  6:37PM  
This patient has been assessed with a concern for Malnutrition and has been determined to have a diagnosis/diagnoses of Severe protein-calorie malnutrition and Underweight (BMI < 19).    This patient is being managed with:   Diet NPO with Tube Feed-  Tube Feeding Modality: Gastrostomy  Nepro with Carb Steady (NEPRORTH)  Total Volume for 24 Hours (mL): 1080  Continuous  Starting Tube Feed Rate {mL per Hour}: 20  Increase Tube Feed Rate by (mL): 10     Every 4 hours  Until Goal Tube Feed Rate (mL per Hour): 45  Tube Feed Duration (in Hours): 24  Tube Feed Start Time: 19:30  Entered: May 17 2024  3:28PM  
This patient has been assessed with a concern for Malnutrition and has been determined to have a diagnosis/diagnoses of Severe protein-calorie malnutrition and Underweight (BMI < 19).    This patient is being managed with:   Diet NPO with Tube Feed-  Tube Feeding Modality: Orogastric  Nepro with Carb Steady (NEPRORTH)  Total Volume for 24 Hours (mL): 1080  Continuous  Starting Tube Feed Rate {mL per Hour}: 10  Increase Tube Feed Rate by (mL): 35     Every hour  Until Goal Tube Feed Rate (mL per Hour): 45  Tube Feed Duration (in Hours): 24  Tube Feed Start Time: 11:00  Tube Feed Stop Time: 05:00  Liquid Protein Supplement     Qty per Day:  3  Entered: May  6 2024  6:18PM  
This patient has been assessed with a concern for Malnutrition and has been determined to have a diagnosis/diagnoses of Severe protein-calorie malnutrition and Underweight (BMI < 19).    This patient is being managed with:   Diet NPO-  Except Medications  Entered: May  2 2024  5:11AM  
This patient has been assessed with a concern for Malnutrition and has been determined to have a diagnosis/diagnoses of Severe protein-calorie malnutrition and Underweight (BMI < 19).    This patient is being managed with:   Diet NPO-  Except Medications  Entered: May  2 2024  5:11AM  
This patient has been assessed with a concern for Malnutrition and has been determined to have a diagnosis/diagnoses of Severe protein-calorie malnutrition and Underweight (BMI < 19).    This patient is being managed with:   Diet NPO after Midnight-     NPO Start Date: 16-May-2024   NPO Start Time: 23:59  Except Medications  Entered: May 16 2024  6:12PM    Diet NPO with Tube Feed-  Tube Feeding Modality: Orogastric  Nepro with Carb Steady (NEPRORTH)  Total Volume for 24 Hours (mL): 1080  Continuous  Starting Tube Feed Rate {mL per Hour}: 10  Increase Tube Feed Rate by (mL): 35     Every hour  Until Goal Tube Feed Rate (mL per Hour): 45  Tube Feed Duration (in Hours): 24  Tube Feed Start Time: 11:00  Tube Feed Stop Time: 05:00  Liquid Protein Supplement     Qty per Day:  3  Entered: May 13 2024  9:38AM  
This patient has been assessed with a concern for Malnutrition and has been determined to have a diagnosis/diagnoses of Severe protein-calorie malnutrition and Underweight (BMI < 19).    This patient is being managed with:   Diet NPO with Tube Feed-  Tube Feeding Modality: Gastrostomy  Nepro with Carb Steady (NEPRORTH)  Continuous  Starting Tube Feed Rate {mL per Hour}: 10  Increase Tube Feed Rate by (mL): 10     Every 6 hours  Until Goal Tube Feed Rate (mL per Hour): 45  Banatrol TF     Qty per Day:  1  Entered: May 28 2024 11:38AM

## 2024-06-14 NOTE — PROGRESS NOTE ADULT - NS ATTEND BILL GEN_ALL_CORE
Attending to bill

## 2024-06-14 NOTE — PROGRESS NOTE ADULT - SUBJECTIVE AND OBJECTIVE BOX
Cardiovascular Disease Progress Note  Date of service: 06-14-24 @ 13:46    Overnight events: No acute events overnight.  Patient is in no distress. Receiving HD  Otherwise review of systems negative    Objective Findings:  T(C): 36.4 (06-14-24 @ 11:15), Max: 36.7 (06-13-24 @ 21:00)  HR: 60 (06-14-24 @ 11:15) (54 - 60)  BP: 149/56 (06-14-24 @ 11:15) (132/90 - 157/53)  RR: 18 (06-14-24 @ 11:15) (16 - 18)  SpO2: 99% (06-14-24 @ 10:41) (98% - 100%)  Wt(kg): --  Daily     Daily       Physical Exam:  Gen: NAD; Patient resting comfortably  HEENT: EOMI, Normocephalic/ atraumatic  CV: RRR, normal S1 + S2, no m/r/g  Lungs:  Trach  Abd: soft, non-tender; bowel sounds present  Ext: No edema; warm and well perfused    Telemetry: Sinus     Laboratory Data:    06-13    133<L>  |  94<L>  |  46<H>  ----------------------------<  183<H>  4.5   |  27  |  3.51<H>    Ca    8.7      13 Jun 2024 05:30  Phos  2.7     06-13  Mg     2.40     06-13                Inpatient Medications:  MEDICATIONS  (STANDING):  albuterol    0.083% 2.5 milliGRAM(s) Nebulizer every 6 hours  amLODIPine   Tablet 10 milliGRAM(s) Oral daily  apixaban 5 milliGRAM(s) Enteral Tube every 12 hours  aspirin  chewable 81 milliGRAM(s) Oral daily  atorvastatin 20 milliGRAM(s) Oral at bedtime  carvedilol 25 milliGRAM(s) Oral every 12 hours  chlorhexidine 0.12% Liquid 15 milliLiter(s) Oral Mucosa two times a day  chlorhexidine 2% Cloths 1 Application(s) Topical <User Schedule>  dextrose 10% Bolus 125 milliLiter(s) IV Bolus once  dextrose 5%. 1000 milliLiter(s) (100 mL/Hr) IV Continuous <Continuous>  dextrose 5%. 1000 milliLiter(s) (50 mL/Hr) IV Continuous <Continuous>  dextrose 50% Injectable 25 Gram(s) IV Push once  dextrose 50% Injectable 12.5 Gram(s) IV Push once  epoetin reilly (EPOGEN) Injectable 72714 Unit(s) IV Push <User Schedule>  ferrous    sulfate Liquid 300 milliGRAM(s) Enteral Tube daily  glucagon  Injectable 1 milliGRAM(s) IntraMuscular once  hydrALAZINE 100 milliGRAM(s) Oral three times a day  insulin lispro (ADMELOG) corrective regimen sliding scale   SubCutaneous every 6 hours  insulin NPH human recombinant 4 Unit(s) SubCutaneous every 6 hours  lidocaine   4% Patch 2 Patch Transdermal every 24 hours  pantoprazole  Injectable 40 milliGRAM(s) IV Push every 12 hours  polyethylene glycol 3350 17 Gram(s) Oral daily  senna 2 Tablet(s) Oral at bedtime      Assessment:  73 year old man with HTN, HLD, T2DM on insulin, and ESRD on HD presents with supply demand ischemia and angina.    Plan of Care:    #Type II myocardial infarction-  Secondary to distributive shock earlier on admission.   The repeat echo shows no new wall motion abnormality.   I would not pursue cath at this time given debility and chronic respiratory failure s/p trach 5/14.       #HTN-  BP acceptable on current regimen.        #ESRD-  HD as per renal     #DVT   - R common Iliac DVT  AC as per RCU.       Over 55 minutes spent on total encounter; more than 50% of the visit was spent counseling and/or coordinating care by the attending physician.      Tico Bravo DO [facility]  Cardiovascular Disease  [phone]

## 2024-06-14 NOTE — PROGRESS NOTE ADULT - ASSESSMENT
73-year-old male past medical history hypertension, ESRD on dialysis Monday Wednesday Friday, diabetes insulin-dependent presents with hiccups patient endorses persistent hiccups for the last 2 days. Nephrology consulted for HD needs.    A/P  ESRD:  Center: [place]  Nephrologist: Dr. Hardwick  Access: R AVF nonfunction now s/p thrombectomy 6/4.  HD via AVF working well 6/5;  shiley removed 6/6  last hd 6/12 tolerated well uf 2L  hd today  Continue MWF  Consent obtained and placed in ED chart  Renal diet  Monitor BMP - no labs today.  Consider [facility] for rehab where his outpatient Nephrologist, Dr. Hardwick can follow him.    Hyperkalemia/Hypokalemia  Improved w/ HD.  C/W HD schedule as above.  Lokelma 10gm PRN for K >5.3 on non-HD days  Low K diet.  Monitor     HTN:  BP  controlled.  On carvedilol 12.5mg BID, hydralazine 100mg TID.  On norvasc -- > down titrate if BP remains marginal / controlled.  UF w/ HD as BP permits.  Monitor BP.    Anemia:  Hgb stable  + iron deficiency.  Tsat 13% - on ferrous sulfate 300mg qd via PEG.  Venofer held d/t leukocytosis.  SILVA w/ HD.  Transfuse for Hgb <7.  Monitor Hb.    CKD-MBD   - low for CKD.  PO4 low; repleted w/ PhosNaK on 6/8.  Supplement PO4 as needed.  Sevelamer 1600mg TID d/c'ed 5/21.  Monitor Ca, PO4 daily    Hypocalcemia:  In setting of CKD vs. hypoalbuminemia.  Optimize albumin.  Corrected Ca WNL.  Replete as needed.  Monitor Ca.    Hyponatremia  in setting of esrd   UF w/ HD.  Free fluid restriction to 1L/day.  monitor

## 2024-06-14 NOTE — H&P ADULT - NSHPREVIEWOFSYSTEMS_GEN_ALL_CORE
REVIEW OF SYSTEMS  Constitutional: No fever, No Chills, No fatigue  HEENT: No eye pain, No visual disturbances, No difficulty hearing. + hypophonia  Pulm: No cough,  No shortness of breath. + trach  Cardio: No chest pain, No palpitations  GI:  No abdominal pain, No nausea, No vomiting, No diarrhea, No constipation. LBM 6/13  : No dysuria,  Neuro: No headaches, No memory loss, + loss of strength, No numbness, No tremors  Skin: No itching,   MSK: No joint pain, No joint swelling, No muscle pain, No Neck or back pain  Psych:  No depression, No anxiety

## 2024-06-14 NOTE — PROGRESS NOTE ADULT - SUBJECTIVE AND OBJECTIVE BOX
Patient is a 73y Male     Patient is a 73y old  Male who presents with a chief complaint of Unstable angina, abn EKG (13 Jun 2024 21:48)      HPI:  73-year-old male past medical history hypertension, ESRD on dialysis Monday Wednesday Friday, diabetes insulin-dependent presents with hiccups patient endorses persistent hiccups for the last 2 days. found to have peaked T waves, a new finding. denies dizziness, N/V, denies CP, palps, abd pain (29 Apr 2024 21:15)      PAST MEDICAL & SURGICAL HISTORY:  Diabetes      Benign essential HTN      HLD (hyperlipidemia)      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      ESRD on hemodialysis      Arteriovenous fistula          MEDICATIONS  (STANDING):  albuterol    0.083% 2.5 milliGRAM(s) Nebulizer every 6 hours  amLODIPine   Tablet 10 milliGRAM(s) Oral daily  apixaban 5 milliGRAM(s) Enteral Tube every 12 hours  aspirin  chewable 81 milliGRAM(s) Oral daily  atorvastatin 20 milliGRAM(s) Oral at bedtime  carvedilol 25 milliGRAM(s) Oral every 12 hours  chlorhexidine 0.12% Liquid 15 milliLiter(s) Oral Mucosa two times a day  chlorhexidine 2% Cloths 1 Application(s) Topical <User Schedule>  dextrose 10% Bolus 125 milliLiter(s) IV Bolus once  dextrose 5%. 1000 milliLiter(s) (100 mL/Hr) IV Continuous <Continuous>  dextrose 5%. 1000 milliLiter(s) (50 mL/Hr) IV Continuous <Continuous>  dextrose 50% Injectable 25 Gram(s) IV Push once  dextrose 50% Injectable 12.5 Gram(s) IV Push once  epoetin reilly (EPOGEN) Injectable 74231 Unit(s) IV Push <User Schedule>  ferrous    sulfate Liquid 300 milliGRAM(s) Enteral Tube daily  glucagon  Injectable 1 milliGRAM(s) IntraMuscular once  hydrALAZINE 100 milliGRAM(s) Oral three times a day  insulin lispro (ADMELOG) corrective regimen sliding scale   SubCutaneous every 6 hours  insulin NPH human recombinant 4 Unit(s) SubCutaneous every 6 hours  lidocaine   4% Patch 2 Patch Transdermal every 24 hours  pantoprazole  Injectable 40 milliGRAM(s) IV Push every 12 hours  polyethylene glycol 3350 17 Gram(s) Oral daily  senna 2 Tablet(s) Oral at bedtime      Allergies    No Known Allergies    Intolerances        SOCIAL HISTORY:  Denies ETOh,Smoking,     FAMILY HISTORY:  FHx: diabetes mellitus (Father, Aunt)        REVIEW OF SYSTEMS:    CONSTITUTIONAL: No weakness, fevers or chills  EYES/ENT: No visual changes;  No vertigo or throat pain   NECK: No pain or stiffness  RESPIRATORY: No cough, wheezing, hemoptysis; No shortness of breath  CARDIOVASCULAR: No chest pain or palpitations  GASTROINTESTINAL: No abdominal or epigastric pain. No nausea, vomiting, or hematemesis; No diarrhea or constipation. No melena or hematochezia.  GENITOURINARY: No dysuria, frequency or hematuria  NEUROLOGICAL: No numbness or weakness  SKIN: No itching, burning, rashes, or lesions   All other review of systems is negative unless indicated above.    VITAL:  T(C): , Max: 36.7 (06-13-24 @ 21:00)  T(F): , Max: 98 (06-13-24 @ 21:00)  HR: 57 (06-14-24 @ 05:58)  BP: 132/90 (06-14-24 @ 05:58)  BP(mean): 98 (06-14-24 @ 05:58)  RR: 18 (06-14-24 @ 05:58)  SpO2: 100% (06-14-24 @ 05:58)  Wt(kg): --    I and O's:    06-12 @ 07:01  -  06-13 @ 07:00  --------------------------------------------------------  IN: 1830 mL / OUT: 2400 mL / NET: -570 mL    06-13 @ 07:01  -  06-14 @ 07:00  --------------------------------------------------------  IN: 890 mL / OUT: 0 mL / NET: 890 mL          PHYSICAL EXAM:    Constitutional: NAD  HEENT: PERRLA,   Neck: No JVD  Respiratory: CTA B/L  Cardiovascular: S1 and S2  Gastrointestinal: BS+, soft, NT/ND  Extremities: No peripheral edema  Neurological: A/O x 3, no focal deficits  Psychiatric: Normal mood, normal affect  : No Abbasi  Skin: No rashes  Access: Not applicable  Back: No CVA tenderness    LABS:    06-13    133<L>  |  94<L>  |  46<H>  ----------------------------<  183<H>  4.5   |  27  |  3.51<H>    Ca    8.7      13 Jun 2024 05:30  Phos  2.7     06-13  Mg     2.40     06-13            RADIOLOGY & ADDITIONAL STUDIES:

## 2024-06-14 NOTE — H&P ADULT - NSHPPHYSICALEXAM_GEN_ALL_CORE
Constitutional - NAD, Comfortable, cachectic  HEENT - NCAT,   Neck - Supple, No limited ROM  Chest - good chest expansion, good respiratory effort. + trach  Cardio - warm and well perfused,   Abdomen -  Soft, NTND. + peg.   Extremities - No peripheral edema, No calf tenderness. RUE fistula.   Neurologic Exam:                    Cognitive -             Orientation: AO to himself, year, month and day. Not to location.             Attention: Recall 0/3 objects with cuing            Memory: Recent/ remote memory intact            Thought: process, content appropriate     Speech - +hypophonia. Fluent, Comprehensible, No dysarthria, No aphasia      Cranial Nerves - No facial asymmetry, Tongue midline, Shoulder shrug intact     Motor -                      LEFT    UE - ShAB 4/5, EF 4/5, EE 3/5, WE 4/5,  WNL                    RIGHT UE - ShAB 4/5, EF 4/5, EE 3/5, WE 4/5,  WNL                    LEFT    LE - HF 3/5, KE 4/5, DF 4/5, PF 4/5                    RIGHT LE - HF 3/5, KE 4/5, DF 4/5, PF 4/5        Sensory - Intact to LT bilateral in face, arm and legs.      Reflexes - DTR 1 / 4 biceps symmetric. neg Latham's b/l, No clonus.  Psychiatric - Mood stable, Affect WNL  Skin on admission: no sacral pressure injuries.

## 2024-06-14 NOTE — H&P ADULT - HISTORY OF PRESENT ILLNESS
73-year-old male w/ past medical history hypertension, ESRD (HD MWF)  insulin-dependent DM presented 4/29/24 to [facility] with hiccups, chest pain of several days duration, admitted for concern demand ischemia. Early on, he was givenv baclofen for his hiccups, but soon developed AMS with 2 RRTs by 5/2/24, prompting intubation, upgrade to MICU. There was concern that baclofen toxicity was the culprit of his then-encephalopathic state, as patient had also missed a HD session due to a clotted fistula.     In coming days, he was treated empirically with 5 days of zosyn, had a negative CTH and LP, and had an EEG that did not capture a seizure but showed diffuse non-specific cerebral dysfunction - c/w toxic-metabolic encephalopathies. On 5/6, an MRI showed that the patient had sustained R pontine and cerebellar stroke. He was extubated on 5/9, but owing to a challenging extubation became septic w/ B Cereus, treated with Vanc. He had trach placed 5/14, PEG 5/17, then downgraded.     On the floors, he was followed by several services, including Neurology, who mention the strokes were embolic appearing. Subsequent hospital course notable for nonocclusive R common iliac DVT, for which he was started on a heparin drip which was stopped when patient developed an upper GI Bleed, though GI was consulted and felt he did not have an overt bleed. His dialysis fistula site was stenotic, s/p fistulogram, ultimately changed to a ?R IJ AVF. He was ultimately started on Eliquis 6/7 for the DVT. Medically stable, he is admitted to [facility] on 6/14/24.  73-year-old male w/ past medical history hypertension, ESRD (HD MWF)  insulin-dependent DM presented 4/29/24 to [facility] with hiccups, chest pain of several days duration, admitted for concern demand ischemia. Early on, he was givenv baclofen for his hiccups, but soon developed AMS with 2 RRTs by 5/2/24, prompting intubation, upgrade to MICU. There was concern that baclofen toxicity was the culprit of his then-encephalopathic state, as patient had also missed a HD session due to a clotted fistula.     In coming days, he was treated empirically with 5 days of zosyn, had a negative CTH and LP, and had an EEG that did not capture a seizure but showed diffuse non-specific cerebral dysfunction - c/w toxic-metabolic encephalopathies. On 5/6, an MRI showed that the patient had sustained R pontine and cerebellar stroke. He was extubated on 5/9, but owing to a challenging extubation became septic w/ B Cereus, treated with Vanc. He had trach placed 5/14, PEG 5/17, then downgraded.     On the floors, he was followed by several services, including Neurology, who mention the strokes were embolic appearing. Subsequent hospital course notable for nonocclusive R common iliac DVT, for which he was started on a heparin drip which was stopped when patient developed an upper GI Bleed, though GI was consulted and felt he did not have an overt bleed. His dialysis fistula site was stenotic, s/p fistulogram, ultimately changed to a RUE AVF 6/5. He was ultimately started on Eliquis 6/7 for the DVT. Medically stable, he is admitted to [facility] on 6/14/24.

## 2024-06-14 NOTE — PROGRESS NOTE ADULT - ASSESSMENT
73M PMHx HTN, ESRD, IDDM p/w hiccups and started on baclofen with concern for AMS overnight and desaturation, ?cheye nascimento respirations. Labs with numerous metabolic derangements. CTH with bifrontal encephalomalacia, likley traumatic.   WBC inc significantly, now intubated. Exam limited as on propofol, also on pressors.   s/p LP for meningitis concerns however appears bland - not on antibiotics  EEG with GPDs, no seizures  MR brain with punctuate L pontine and L cerebellar infarcts  MRA H/N with mild basilar fenestration, otherwise neg  mental status improving post extubation but now reintubated for recurrent aspiration   s/p trach, neurologically improving  o/e with R hemiparesis, mental status markedly improved    AMS of unclear etiology - ? baclofen toxicity, no evidence of seizures. 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 - embolic appearing  Intractable hiccups  hypoxic resp failure  ESRD on HD  B cereus bacteremia, likely contaminant  Nonocclusive R common iliac DVT  UGIB    MRI, MRA reviewed, as above - R hemiparesis on exam likely related to known L sided strokes - previously giving poor effort on exam   cont aspirin 81mg  TTE reviewed, no need to repeat for now  Keppra started for GPDs, no improvement in mental status - now better off keppra  continue to monitor off Keppra, avoid baclofen and reglan  s/p trach, peg   HD per nephro  f/u remainder of CSF - negative  s/p Zosyn, Meropenem and Cipro  on hep gtt for DVT  on hold for UGIB -> now on Eliquis  GI following   Has kolby for HD

## 2024-06-14 NOTE — PROGRESS NOTE ADULT - TIME BILLING
time spent in review of laboratory data, radiology images and results, discussion with primary team/patient, and monitoring for potential decompensation. Interventions performed as documented above. In addition, time also spent to risks and benefits of the procedure, alternative procedures, diagnostic and therapeutic outcomes as well as further management plan. Complex patient requiring services. Interventional Pulmonology services provided to the patient were separate from general pulmonary service due to complexity of issues and interventions required. Time spent separate from time spent doing any procedures.
Review of patient records, including history, laboratory data, and imaging. Patient evaluation and assessment. Coordination of care. Time excludes teaching or procedures.
Review of patient records, including history, laboratory data, and imaging. Patient evaluation and assessment. Coordination of care. Time excludes teaching or procedures.
review of the medical record, interpretation of labs, imaging studies, discussion with interdisciplinary team, physical exam and medical decision making as above
Review of patient records, including history, laboratory data, and imaging. Patient evaluation and assessment. Coordination of care. Time excludes teaching or procedures.
Review of patient records, including history, laboratory data, and imaging. Patient evaluation and assessment. Coordination of care. Time excludes teaching or procedures.
review of the medical record, interpretation of labs, imaging studies, discussion with interdisciplinary team, physical exam and medical decision making as above
Review of patient records, including history, laboratory data, and imaging. Patient evaluation and assessment. Coordination of care. Time excludes teaching or procedures.
review of the medical record, interpretation of labs, imaging studies, discussion with interdisciplinary team, physical exam and medical decision making as above
Review of patient records, including history, laboratory data, and imaging. Patient evaluation and assessment. Coordination of care. Time excludes teaching or procedures.
review of the medical record, interpretation of labs, imaging studies, discussion with interdisciplinary team, physical exam and medical decision making as above
Review of patient records, including history, laboratory data, and imaging. Patient evaluation and assessment. Coordination of care. Time excludes teaching or procedures.

## 2024-06-14 NOTE — PROGRESS NOTE ADULT - SUBJECTIVE AND OBJECTIVE BOX
Neurology Progress Note    S: Patient seen and examined.     Medications: MEDICATIONS  (STANDING):  albuterol    0.083% 2.5 milliGRAM(s) Nebulizer every 6 hours  amLODIPine   Tablet 10 milliGRAM(s) Oral daily  apixaban 5 milliGRAM(s) Enteral Tube every 12 hours  aspirin  chewable 81 milliGRAM(s) Oral daily  atorvastatin 20 milliGRAM(s) Oral at bedtime  carvedilol 25 milliGRAM(s) Oral every 12 hours  chlorhexidine 0.12% Liquid 15 milliLiter(s) Oral Mucosa two times a day  chlorhexidine 2% Cloths 1 Application(s) Topical <User Schedule>  dextrose 10% Bolus 125 milliLiter(s) IV Bolus once  dextrose 5%. 1000 milliLiter(s) (100 mL/Hr) IV Continuous <Continuous>  dextrose 5%. 1000 milliLiter(s) (50 mL/Hr) IV Continuous <Continuous>  dextrose 50% Injectable 25 Gram(s) IV Push once  dextrose 50% Injectable 12.5 Gram(s) IV Push once  epoetin reilly (EPOGEN) Injectable 30940 Unit(s) IV Push <User Schedule>  ferrous    sulfate Liquid 300 milliGRAM(s) Enteral Tube daily  glucagon  Injectable 1 milliGRAM(s) IntraMuscular once  hydrALAZINE 100 milliGRAM(s) Oral three times a day  insulin lispro (ADMELOG) corrective regimen sliding scale   SubCutaneous every 6 hours  insulin NPH human recombinant 4 Unit(s) SubCutaneous every 6 hours  lidocaine   4% Patch 2 Patch Transdermal every 24 hours  pantoprazole  Injectable 40 milliGRAM(s) IV Push every 12 hours  polyethylene glycol 3350 17 Gram(s) Oral daily  senna 2 Tablet(s) Oral at bedtime    MEDICATIONS  (PRN):  acetaminophen   Oral Liquid .. 650 milliGRAM(s) Oral every 6 hours PRN Temp greater or equal to 38C (100.4F), Moderate Pain (4 - 6)  dextrose Oral Gel 15 Gram(s) Oral once PRN Blood Glucose LESS THAN 70 milliGRAM(s)/deciliter  hydrALAZINE Injectable 10 milliGRAM(s) IV Push every 8 hours PRN SBP > 180  sodium chloride 0.9% lock flush 10 milliLiter(s) IV Push every 1 hour PRN Pre/post blood products, medications, blood draw, and to maintain line patency       Vitals:  Vital Signs Last 24 Hrs  T(C): 36.4 (14 Jun 2024 11:15), Max: 36.7 (13 Jun 2024 21:00)  T(F): 97.6 (14 Jun 2024 11:15), Max: 98 (13 Jun 2024 21:00)  HR: 60 (14 Jun 2024 11:15) (54 - 60)  BP: 149/56 (14 Jun 2024 11:15) (132/90 - 157/53)  BP(mean): 98 (14 Jun 2024 05:58) (80 - 98)  RR: 18 (14 Jun 2024 11:15) (16 - 18)  SpO2: 99% (14 Jun 2024 10:41) (98% - 100%)    Parameters below as of 14 Jun 2024 11:15  Patient On (Oxygen Delivery Method): tracheostomy collar    O2 Concentration (%): 28            General Exam:   General Appearance: + trach  Head: Normocephalic, atraumatic and no dysmorphic features  Ear, Nose, and Throat: Moist mucous membranes  CVS: S1S2+  Resp: mechanical  Abd: soft NTND  Extremities: No edema, no cyanosis  Skin: No bruises, no rashes     Neurological Exam:  Mental Status: Opens to voice, tracks, follows simple commands. Mouths words  Cranial Nerves: pupils 1-2mm, R facial palsy with smile  Motor: normal tone, RUE and RLE drift.  Sensation:  intact      I personally reviewed the below data/images/labs:    LABS:      06-13    133<L>  |  94<L>  |  46<H>  ----------------------------<  183<H>  4.5   |  27  |  3.51<H>    Ca    8.7      13 Jun 2024 05:30  Phos  2.7     06-13  Mg     2.40     06-13          Urinalysis Basic - ( 13 Jun 2024 05:30 )    Color: x / Appearance: x / SG: x / pH: x  Gluc: 183 mg/dL / Ketone: x  / Bili: x / Urobili: x   Blood: x / Protein: x / Nitrite: x   Leuk Esterase: x / RBC: x / WBC x   Sq Epi: x / Non Sq Epi: x / Bacteria: x              CT Head No Cont:  (01 May 2024 18:11)  < from: CT Head No Cont (05.01.24 @ 18:11) >    ACC: [id number] EXAM:  CT BRAIN   ORDERED BY: SHELBY MOREL     PROCEDURE DATE:  05/01/2024          INTERPRETATION:  CT OF THE HEAD WITHOUT CONTRAST    CLINICAL INDICATION: Lethargy.    TECHNIQUE: Volumetric CT acquisition was performed through the brain and   reviewed using brain and bone window technique.      COMPARISON: CT head from 1/17/2024    FINDINGS:    The ventricular and sulcal size and configuration is age appropriate.     There is no acute loss of gray-white differentiation. Stable bilateral   inferior frontal encephalomalacia and gliosis likely related to remote   trauma.    There is no evidence of mass effect, midline shift, acute intracranial   hemorrhage, or extra-axial collections.     The calvarium is intact. The paranasalsinuses are clear.The mastoid air   cells are predominantly clear. The orbits are unremarkable.      IMPRESSION:  No acute intracranial hemorrhage or acute territorial infarction. Chronic   findings as above.    --- End of Report ---          GILDARDO KHAN; Resident Radiologist  This document has been electronically signed.  LADARIUS DENNY MD; Attending Radiologist  This document has been electronically signed. May  1 2024  7:54PM    < end of copied text >      EEG Classification / Summary:  Abnormal EEG in the awake, drowsy states.   -Generalized sharp waves with triphasic morphology, initially appearing as frequent GPDs at 1-1.5 Hz, decreasing in prevalence later in recording.  -Variable moderate to mild diffuse slowing.  -No electrographic seizures are captured.     Clinical Impression:  -Generalized sharp waves with triphasic morphology can be seen in toxic-metabolic encephalopathies and indicate some risk of generalized seizures, especially when at higher frequencies than in this study. These decrease in prevalence over the course of recording.  -Variable moderate to mild diffuse cerebral dysfunction is nonspecific in etiology.   -No seizures x2 days of recording.    m< from: MR Head w/wo IV Cont (05.06.24 @ 18:10) >    ACC: [id number] EXAM:  MR BRAIN WAW IC   ORDERED BY: DOLORES QUICK     PROCEDURE DATE:  05/06/2024          INTERPRETATION:  CLINICAL INDICATION: Altered mental status.    TECHNIQUE: Multi-planar, multi-sequence magnetic resonance imaging of the   brain was performed with and without intravenous contrast.    CONTRAST: Post-contrast MR images were obtained following infusion of 5   mL of Gadavist    COMPARISON: No prior studies are available for comparison    FINDINGS:    VENTRICLES AND SULCI:  Normal.  INTRA-AXIAL: Punctate foci of restricted diffusion in the left talya and   left cerebellum with associated T2 prolongation consistent with acute   infarcts. No acute intracranial hemorrhage. Encephalomalacia/gliosis   noted in the inferior frontal lobes. No abnormal enhancement. There are   patchy and confluent foci of hyperintense T2 signal within the   subcortical and periventricular white matter which are nonspecific but   likely related to chronic microvascular ischemic disease.  EXTRA-AXIAL:  No mass or collection.  VISUALIZED SINUSES: Mild polypoid mucosal thickening. Fluid noted in the   nasopharynx.  VISUALIZED MASTOIDS:  Clear.  CALVARIUM:  Normal.  CAROTID FLOW VOIDS: Normal.  MISCELLANEOUS:  None.    IMPRESSION: PUNCTATE FOCI OF RESTRICTED DIFFUSION IN THE LEFT TALYA AND   LEFT CEREBELLUM WITH ASSOCIATED T2 PROLONGATION CONSISTENT WITH ACUTE   INFARCTS. NO ACUTE INTRACRANIAL HEMORRHAGE. NO AREA OF ABNORMAL   ENHANCEMENT.    < end of copied text >    m< from: MR Angio Head No Cont (05.09.24 @ 15:58) >    ACC: [id number] EXAM:  MR ANGIO NECK WAW IC   ORDERED BY: OMAR BUTTON     ACC: [id number] EXAM:  MR ANGIO BRAIN   ORDERED BY: OMAR NESBITT     PROCEDURE DATE:  05/09/2024          INTERPRETATION:  .    CLINICAL INFORMATION: Stroke workup. Talya/cerebellar stroke.    TECHNIQUE: MRA images through the neck and [place] were obtained   using a combination of 2-D and 3-D time-of-flight acquisition. Post   contrast MR angiography of the neck was also performed. The data was then   reformatted intoa volumetric data set and reviewed as rotational MIP   images. 5 cc's of IV Gadavist was administered without immediate   complication. 2.5 cc's was discarded.    COMPARISON: Prior head CT exam dated 5/1/2024.    FINDINGS:    MRA Neck: There is a standard anatomic configuration to the aortic arch.    The origins of the great vessels appear unremarkable. The bilateral   common carotid arteries and carotid bifurcations appear unremarkable.    The bilateral cervical internal carotid arteries are within normal limits.    The origins of the bilateral vertebral arteries are normal. The bilateral   cervical vertebral arteries are normal in course and caliber.    MRA [place]: The bilateral intracranial internal carotid,   anterior, and middle cerebral arteries appear unremarkable.    The anterior and bilateral posterior communicating arteries are notable.    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 Otherwise, the   bilateral intradural vertebral arteries, vertebrobasilar junction,   basilar artery, and basilar tip appear unremarkable as well as the   bilateral posterior cerebral arteries.    IMPRESSION: No large vessel occlusion or stenosis.    < end of copied text >

## 2024-06-15 LAB
ALBUMIN SERPL ELPH-MCNC: 2.5 G/DL — LOW (ref 3.3–5)
ALP SERPL-CCNC: 201 U/L — HIGH (ref 40–120)
ALT FLD-CCNC: 24 U/L — SIGNIFICANT CHANGE UP (ref 10–45)
ANION GAP SERPL CALC-SCNC: 7 MMOL/L — SIGNIFICANT CHANGE UP (ref 5–17)
AST SERPL-CCNC: 20 U/L — SIGNIFICANT CHANGE UP (ref 10–40)
BASOPHILS # BLD AUTO: 0.09 K/UL — SIGNIFICANT CHANGE UP (ref 0–0.2)
BASOPHILS NFR BLD AUTO: 1 % — SIGNIFICANT CHANGE UP (ref 0–2)
BILIRUB SERPL-MCNC: 0.4 MG/DL — SIGNIFICANT CHANGE UP (ref 0.2–1.2)
BUN SERPL-MCNC: 51 MG/DL — HIGH (ref 7–23)
CALCIUM SERPL-MCNC: 8.6 MG/DL — SIGNIFICANT CHANGE UP (ref 8.4–10.5)
CHLORIDE SERPL-SCNC: 97 MMOL/L — SIGNIFICANT CHANGE UP (ref 96–108)
CO2 SERPL-SCNC: 29 MMOL/L — SIGNIFICANT CHANGE UP (ref 22–31)
CREAT SERPL-MCNC: 3.88 MG/DL — HIGH (ref 0.5–1.3)
EGFR: 16 ML/MIN/1.73M2 — LOW
EOSINOPHIL # BLD AUTO: 0.13 K/UL — SIGNIFICANT CHANGE UP (ref 0–0.5)
EOSINOPHIL NFR BLD AUTO: 1.4 % — SIGNIFICANT CHANGE UP (ref 0–6)
GLUCOSE BLDC GLUCOMTR-MCNC: 223 MG/DL — HIGH (ref 70–99)
GLUCOSE BLDC GLUCOMTR-MCNC: 239 MG/DL — HIGH (ref 70–99)
GLUCOSE BLDC GLUCOMTR-MCNC: 89 MG/DL — SIGNIFICANT CHANGE UP (ref 70–99)
GLUCOSE SERPL-MCNC: 154 MG/DL — HIGH (ref 70–99)
HCT VFR BLD CALC: 31 % — LOW (ref 39–50)
HGB BLD-MCNC: 9.7 G/DL — LOW (ref 13–17)
IMM GRANULOCYTES NFR BLD AUTO: 0.3 % — SIGNIFICANT CHANGE UP (ref 0–0.9)
LYMPHOCYTES # BLD AUTO: 0.88 K/UL — LOW (ref 1–3.3)
LYMPHOCYTES # BLD AUTO: 9.4 % — LOW (ref 13–44)
MCHC RBC-ENTMCNC: 22.8 PG — LOW (ref 27–34)
MCHC RBC-ENTMCNC: 31.3 GM/DL — LOW (ref 32–36)
MCV RBC AUTO: 72.9 FL — LOW (ref 80–100)
MONOCYTES # BLD AUTO: 0.82 K/UL — SIGNIFICANT CHANGE UP (ref 0–0.9)
MONOCYTES NFR BLD AUTO: 8.8 % — SIGNIFICANT CHANGE UP (ref 2–14)
NEUTROPHILS # BLD AUTO: 7.41 K/UL — HIGH (ref 1.8–7.4)
NEUTROPHILS NFR BLD AUTO: 79.1 % — HIGH (ref 43–77)
NRBC # BLD: 0 /100 WBCS — SIGNIFICANT CHANGE UP (ref 0–0)
PLATELET # BLD AUTO: 328 K/UL — SIGNIFICANT CHANGE UP (ref 150–400)
POTASSIUM SERPL-MCNC: 4.1 MMOL/L — SIGNIFICANT CHANGE UP (ref 3.5–5.3)
POTASSIUM SERPL-SCNC: 4.1 MMOL/L — SIGNIFICANT CHANGE UP (ref 3.5–5.3)
PROT SERPL-MCNC: 7.1 G/DL — SIGNIFICANT CHANGE UP (ref 6–8.3)
RBC # BLD: 4.25 M/UL — SIGNIFICANT CHANGE UP (ref 4.2–5.8)
RBC # FLD: 24.2 % — HIGH (ref 10.3–14.5)
SODIUM SERPL-SCNC: 133 MMOL/L — LOW (ref 135–145)
WBC # BLD: 9.36 K/UL — SIGNIFICANT CHANGE UP (ref 3.8–10.5)
WBC # FLD AUTO: 9.36 K/UL — SIGNIFICANT CHANGE UP (ref 3.8–10.5)

## 2024-06-15 PROCEDURE — 99223 1ST HOSP IP/OBS HIGH 75: CPT

## 2024-06-15 RX ADMIN — Medication 1 APPLICATION(S): at 06:53

## 2024-06-15 RX ADMIN — ATORVASTATIN CALCIUM 20 MILLIGRAM(S): 20 TABLET, FILM COATED ORAL at 06:24

## 2024-06-15 RX ADMIN — CARVEDILOL PHOSPHATE 25 MILLIGRAM(S): 80 CAPSULE, EXTENDED RELEASE ORAL at 06:53

## 2024-06-15 RX ADMIN — LIDOCAINE HCL 2 PATCH: 28 GEL TOPICAL at 11:56

## 2024-06-15 RX ADMIN — POLYETHYLENE GLYCOL 3350 17 GRAM(S): 1 POWDER ORAL at 11:59

## 2024-06-15 RX ADMIN — INSULIN LISPRO 2: 100 INJECTION, SOLUTION SUBCUTANEOUS at 11:58

## 2024-06-15 RX ADMIN — Medication 1 TABLET(S): at 11:57

## 2024-06-15 RX ADMIN — LIDOCAINE HCL 2 PATCH: 28 GEL TOPICAL at 23:00

## 2024-06-15 RX ADMIN — Medication 650 MILLIGRAM(S): at 12:18

## 2024-06-15 RX ADMIN — Medication 650 MILLIGRAM(S): at 11:18

## 2024-06-15 RX ADMIN — APIXABAN 5 MILLIGRAM(S): 5 TABLET, FILM COATED ORAL at 17:32

## 2024-06-15 RX ADMIN — AMLODIPINE BESYLATE 10 MILLIGRAM(S): 2.5 TABLET ORAL at 06:53

## 2024-06-15 RX ADMIN — ASPIRIN 81 MILLIGRAM(S): 325 TABLET, FILM COATED ORAL at 11:57

## 2024-06-15 RX ADMIN — Medication 2.5 MILLIGRAM(S): at 08:44

## 2024-06-15 RX ADMIN — PANTOPRAZOLE SODIUM 40 MILLIGRAM(S): 40 INJECTION, POWDER, FOR SOLUTION INTRAVENOUS at 17:32

## 2024-06-15 RX ADMIN — INSULIN LISPRO 2: 100 INJECTION, SOLUTION SUBCUTANEOUS at 08:33

## 2024-06-15 RX ADMIN — APIXABAN 5 MILLIGRAM(S): 5 TABLET, FILM COATED ORAL at 06:53

## 2024-06-15 RX ADMIN — Medication 2.5 MILLIGRAM(S): at 15:23

## 2024-06-15 RX ADMIN — Medication 4 UNIT(S): at 08:33

## 2024-06-15 RX ADMIN — HYDRALAZINE HYDROCHLORIDE 100 MILLIGRAM(S): 50 TABLET ORAL at 14:39

## 2024-06-15 RX ADMIN — HYDRALAZINE HYDROCHLORIDE 100 MILLIGRAM(S): 50 TABLET ORAL at 06:53

## 2024-06-15 RX ADMIN — Medication 300 MILLIGRAM(S): at 11:57

## 2024-06-15 RX ADMIN — LIDOCAINE HCL 2 PATCH: 28 GEL TOPICAL at 19:00

## 2024-06-15 RX ADMIN — Medication 4 UNIT(S): at 12:00

## 2024-06-15 RX ADMIN — PANTOPRAZOLE SODIUM 40 MILLIGRAM(S): 40 INJECTION, POWDER, FOR SOLUTION INTRAVENOUS at 06:54

## 2024-06-15 NOTE — CONSULT NOTE ADULT - SUBJECTIVE AND OBJECTIVE BOX
Patient is a 73y old  Male who presents with a chief complaint of Cerebral infarction     (15 Eze 2024 08:12)      HPI:  73-year-old male w/ past medical history hypertension, ESRD (HD MWF)  insulin-dependent DM presented 4/29/24 to [facility] with hiccups, chest pain of several days duration, admitted for concern demand ischemia. Early on, he was givenv baclofen for his hiccups, but soon developed AMS with 2 RRTs by 5/2/24, prompting intubation, upgrade to MICU. There was concern that baclofen toxicity was the culprit of his then-encephalopathic state, as patient had also missed a HD session due to a clotted fistula.     In coming days, he was treated empirically with 5 days of zosyn, had a negative CTH and LP, and had an EEG that did not capture a seizure but showed diffuse non-specific cerebral dysfunction - c/w toxic-metabolic encephalopathies. On 5/6, an MRI showed that the patient had sustained R pontine and cerebellar stroke. He was extubated on 5/9, but owing to a challenging extubation became septic w/ B Cereus, treated with Vanc. He had trach placed 5/14, PEG 5/17, then downgraded.     On the floors, he was followed by several services, including Neurology, who mention the strokes were embolic appearing. Subsequent hospital course notable for nonocclusive R common iliac DVT, for which he was started on a heparin drip which was stopped when patient developed an upper GI Bleed, though GI was consulted and felt he did not have an overt bleed. His dialysis fistula site was stenotic, s/p fistulogram, ultimately changed to a RUE AVF 6/5. He was ultimately started on Eliquis 6/7 for the DVT. Medically stable, he is admitted to [facility] on 6/14/24.  (14 Jun 2024 14:22)      PAST MEDICAL & SURGICAL HISTORY:  Diabetes      Benign essential HTN      HLD (hyperlipidemia)      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      ESRD on hemodialysis      Arteriovenous fistula              Substance Use (street drugs): ( X ) never used  (  ) other:  Tobacco Usage:  (  X ) never smoked   (   ) former smoker   (   ) current smoker  (     ) pack year  Alcohol Usage: Denies      Allergies    No Known Allergies    Intolerances        epoetin reilly-epbx (RETACRIT) Injectable 88472 Unit(s) IV Push <User Schedule>  Nephro-noam 1 Tablet(s) Oral daily  pantoprazole   Suspension 40 milliGRAM(s) Enteral Tube two times a day      REVIEW OF SYSTEMS: limited  Constitutional: No fever, No Chills, No fatigue  HEENT: No eye pain, No visual disturbances, No difficulty hearing. + hypophonia  Pulm: No cough,  No shortness of breath. + trach  Cardio: No chest pain, No palpitations  GI:  No abdominal pain, No nausea, No vomiting, No diarrhea, No constipation. LBM 6/13  : No dysuria,  Neuro: No headaches, No memory loss, + loss of strength, No numbness, No tremors  Skin: No itching,   MSK: No joint pain, No joint swelling, No muscle pain, No Neck or back pain  Psych:  No depression, No anxiety    T(C): 36.9 (06-15-24 @ 08:17), Max: 37 (06-14-24 @ 21:51)  HR: 59 (06-15-24 @ 08:17) (58 - 61)  BP: 135/61 (06-15-24 @ 08:17) (135/61 - 160/70)  RR: 18 (06-15-24 @ 08:17) (16 - 18)  SpO2: 98% (06-15-24 @ 10:27) (97% - 99%)  Wt(kg): --Vital Signs Last 24 Hrs  T(C): 36.9 (15 Eze 2024 08:17), Max: 37 (14 Jun 2024 21:51)  T(F): 98.4 (15 Eze 2024 08:17), Max: 98.6 (14 Jun 2024 21:51)  HR: 59 (15 Eze 2024 08:17) (58 - 61)  BP: 135/61 (15 Eze 2024 08:17) (135/61 - 160/70)  BP(mean): --  RR: 18 (15 Eze 2024 08:17) (16 - 18)  SpO2: 98% (15 Eze 2024 10:27) (97% - 99%)    Parameters below as of 15 Eze 2024 08:17  Patient On (Oxygen Delivery Method): tracheostomy collar        PHYSICAL EXAM:  GENERAL: NAD, well-groomed, well-developed  HEAD:  Atraumatic, Normocephalic  EYES: EOMI, conjunctiva and sclera clear  ENMT: Moist mucous membranes, trach in place  NECK: Supple, No JVD  NERVOUS SYSTEM:  Alert   CHEST/LUNG: Clear to percussion bilaterally; No rales, rhonchi, wheezing  HEART: Regular rate and rhythm  ABDOMEN: Soft, Nontender, Nondistended; Bowel sounds present. peg tube in place  EXTREMITIES: No clubbing, cyanosis, or edema      LABS:                        9.7    9.36  )-----------( 328      ( 15 Eze 2024 05:14 )             31.0     06-15    133<L>  |  97  |  51<H>  ----------------------------<  154<H>  4.1   |  29  |  3.88<H>    Ca    8.6      15 Eze 2024 05:14    TPro  7.1  /  Alb  2.5<L>  /  TBili  0.4  /  DBili  x   /  AST  20  /  ALT  24  /  AlkPhos  201<H>  06-15      Urinalysis Basic - ( 15 Eze 2024 05:14 )    Color: x / Appearance: x / SG: x / pH: x  Gluc: 154 mg/dL / Ketone: x  / Bili: x / Urobili: x   Blood: x / Protein: x / Nitrite: x   Leuk Esterase: x / RBC: x / WBC x   Sq Epi: x / Non Sq Epi: x / Bacteria: x       CAPILLARY BLOOD GLUCOSE      POCT Blood Glucose.: 223 mg/dL (15 Eze 2024 11:54)  POCT Blood Glucose.: 239 mg/dL (15 Eze 2024 08:25)  POCT Blood Glucose.: 122 mg/dL (14 Jun 2024 22:28)        Urinalysis Basic - ( 15 Eze 2024 05:14 )    Color: x / Appearance: x / SG: x / pH: x  Gluc: 154 mg/dL / Ketone: x  / Bili: x / Urobili: x   Blood: x / Protein: x / Nitrite: x   Leuk Esterase: x / RBC: x / WBC x   Sq Epi: x / Non Sq Epi: x / Bacteria: x        RADIOLOGY & ADDITIONAL TESTS:    < from: TTE W or WO Ultrasound Enhancing Agent (04.30.24 @ 16:56) >  CONCLUSIONS:    1. Left ventricular cavity is small. Left ventricular wall thickness is normal. Left ventricular systolic function is normal with an ejection fraction of 72 % by Tian's method of disks. There are noregional wall motion abnormalities seen. There is normal LV mass and concentric remodeling.   2. Normal left ventricular diastolic function, with normal filling pressure.   3. Normal right ventricular cavity size, with normal wall thickness, and normal systolic function.   4. Normal left and right atrial size.   5. No significant valvular disease.   6. No pericardial effusion seen.  < end of copied text >      MRI Head 5/6: IMPRESSION: PUNCTATE FOCI OF RESTRICTED DIFFUSION IN THE LEFT DA AND  LEFT CEREBELLUM WITH ASSOCIATED T2 PROLONGATION CONSISTENT WITH ACUTE  INFARCTS. NO ACUTE INTRACRANIAL HEMORRHAGE. NO AREA OF ABNORMAL ENHANCEMENT.    Consultant(s) Notes Reviewed:  [x ] YES  [ ] NO  Care Discussed with Consultants/Other Providers [ x] YES  [ ] NO  Imaging Personally Reviewed:  [ ] YES  [ ] NO

## 2024-06-15 NOTE — DIETITIAN INITIAL EVALUATION ADULT - PERTINENT MEDS FT
MEDICATIONS  (STANDING):  albuterol    0.083% 2.5 milliGRAM(s) Nebulizer every 6 hours  amLODIPine   Tablet 10 milliGRAM(s) Oral daily  apixaban 5 milliGRAM(s) Enteral Tube two times a day  aspirin  chewable 81 milliGRAM(s) Oral daily  atorvastatin 20 milliGRAM(s) Oral at bedtime  carvedilol 25 milliGRAM(s) Oral every 12 hours  chlorhexidine 2% Cloths 1 Application(s) Topical <User Schedule>  dextrose 10% Bolus 125 milliLiter(s) IV Bolus once  dextrose 5%. 1000 milliLiter(s) (100 mL/Hr) IV Continuous <Continuous>  dextrose 5%. 1000 milliLiter(s) (50 mL/Hr) IV Continuous <Continuous>  dextrose 50% Injectable 25 Gram(s) IV Push once  dextrose 50% Injectable 12.5 Gram(s) IV Push once  epoetin reilly-epbx (RETACRIT) Injectable 13059 Unit(s) IV Push <User Schedule>  ferrous    sulfate Liquid 300 milliGRAM(s) Enteral Tube daily  glucagon  Injectable 1 milliGRAM(s) IntraMuscular once  hydrALAZINE 100 milliGRAM(s) Oral three times a day  insulin lispro (ADMELOG) corrective regimen sliding scale   SubCutaneous every 6 hours  insulin NPH human recombinant 4 Unit(s) SubCutaneous every 6 hours  lidocaine   4% Patch 2 Patch Transdermal every 24 hours  pantoprazole   Suspension 40 milliGRAM(s) Enteral Tube two times a day  polyethylene glycol 3350 17 Gram(s) Oral daily  senna 2 Tablet(s) Oral at bedtime    MEDICATIONS  (PRN):  acetaminophen   Oral Liquid .. 650 milliGRAM(s) Oral every 6 hours PRN Moderate Pain (4 - 6)  bisacodyl Suppository 10 milliGRAM(s) Rectal daily PRN Constipation  dextrose Oral Gel 15 Gram(s) Oral once PRN Blood Glucose LESS THAN 70 milliGRAM(s)/deciliter  melatonin 3 milliGRAM(s) Oral at bedtime PRN Insomnia

## 2024-06-15 NOTE — DIETITIAN INITIAL EVALUATION ADULT - ENTERAL
Nepro 1.8 1000mL/day @ goal rate 55mL/hr x 18 hrs. Start at 12:00pm to 6:00am (continues feedings secondary to h/o gastroparesis)  Water flush pump 450mL/day @ 25mL/hr x 18 hrs.

## 2024-06-15 NOTE — DIETITIAN INITIAL EVALUATION ADULT - NS FNS DIET ORDER
Diet, NPO with Tube Feed:   Tube Feeding Modality: Gastrostomy  Nepro with Carb Steady (NEPRORTH)  Total Volume for 24 Hours (mL): 1080  Continuous  Starting Tube Feed Rate {mL per Hour}: 10  Increase Tube Feed Rate by (mL): 10     Every 6 hours  Until Goal Tube Feed Rate (mL per Hour): 45  Tube Feed Duration (in Hours): 24  Banatrol TF     Qty per Day:  1 (06-14-24 @ 19:49)

## 2024-06-15 NOTE — DIETITIAN NUTRITION RISK NOTIFICATION - TREATMENT: THE FOLLOWING DIET HAS BEEN RECOMMENDED
Diet, NPO with Tube Feed:   Tube Feeding Modality: Gastrostomy  Nepro with Carb Steady  Total Volume for 24 Hours (mL): 990  Continuous  Starting Tube Feed Rate {mL per Hour}: 20  Increase Tube Feed Rate by (mL): 10     Every 6 hours  Until Goal Tube Feed Rate (mL per Hour): 55  Tube Feed Duration (in Hours): 18  Tube Feed Start Time: 12:00  Tube Feed Stop Time: 06:00  Free Water Flush  Pump   Rate (mL per Hour): 25   Frequency: Every Hour    Duration (Hours): 18    Start Time: 12:00    Stop Time: 06:00 (06-15-24 @ 11:12) [Pending Verification By Attending]

## 2024-06-15 NOTE — DIETITIAN INITIAL EVALUATION ADULT - OTHER INFO
H&P: 73-year-old male w/ past medical history hypertension, ESRD (HD MWF)  insulin-dependent DM presented 4/29/24 to [facility] 4/29 with hiccups and demand ischemia, was treated with baclofen, developed what appears to be toxic encephalopathy. He then sustained a prolonged, complex hospital course over approx 6 weeks, notable for acute respiratory failure, intubation, sepsis, CRRT, DVT, GIB, trach and PEG placement, He is admitted for acute multidisciplinary rehab on 6/14/24 to [facility].      H&P: 73-year-old male w/ past medical history hypertension, ESRD (HD MWF)  insulin-dependent DM presented 4/29/24 to [facility] 4/29 with hiccups and demand ischemia, was treated with baclofen, developed what appears to be toxic encephalopathy. He then sustained a prolonged, complex hospital course over approx 6 weeks, notable for acute respiratory failure, intubation, sepsis, CRRT, DVT, GIB, trach and PEG placement, He is admitted for acute multidisciplinary rehab on 6/14/24 to [facility].     Visit Pt this am. RN initiated TF this morning: Nepro 1.8 @ 10mL/hr. NPO. PEG placement on 5/17. H/o  gastroparesis. As per chart NKFA. Denies N/V/C/D. Last BM: 6/13. As per previous RD note  4/2023 155 Lbs, 1/15: 125 Lbs, 4/29: 116 Lbs,  no weights since admission, request wt. No edema. Skin WDL. Labs 6/15: Na 133 (L), BUN 51 (H), Cr 3.88 (H), eGFR 16 (L), Glu 154 (H), Alb 2.5 (L), Alk phos 201 (H). POCT 122-242 (H). On Admelog and Humulin. NFPE shows severe muscle/mass wasting.

## 2024-06-15 NOTE — DIETITIAN INITIAL EVALUATION ADULT - ETIOLOGY
chronic illness, prolonged hospitalization  dysphagia s/p CVA acute on chronic illness, prolonged hospitalization

## 2024-06-15 NOTE — CONSULT NOTE ADULT - ASSESSMENT
73-year-old male w/ past medical history hypertension, ESRD (HD MWF)  insulin-dependent DM presented 4/29/24 to [facility] 4/29 with hiccups and demand ischemia, was treated with baclofen, developed what appears to be toxic encephalopathy. He then sustained a prolonged, complex hospital course over approx 6 weeks, notable for acute respiratory failure, intubation, sepsis, CRRT, DVT, GIB, trach and PEG placement, He is admitted for acute multidisciplinary rehab on 6/14/24 to [facility].       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  #Hospital Acquired Debility   #Dysphagia  #Gait, ADL, Functional impairments  - Comprehensive Multidisciplinary Rehab, PT/OT/ SLP 3 hr/day 5d/wk  - AC: Eliquis 5 BID for DVT, seen on CTA/P 5/12  - ASA81 qD  - Pain: PRN Tylenol, Lidocaine patch   - Absolute Avoidance of Baclofen   - NPO/Tube Feeds as below    #Acute Hypoxemic, Hypercapnic Respiratory Failure, s/p ETT intubation  #s/p tracheostomy   - Albuterol neb q6h   - Continue TC w/ ATC  - Reportedly tolerating PMV during daytime  - Respiratory to follow in house    #ESRD on HD  #Fistula stenosis   #Anemia   - Continue HD M/W/F per renal   - Access is RUE AVF  - Regular monitoring of electrolytes  - EPOGEN w/ HD  - Iron supplementation, 300 mg daily     #HTN  - Coreg 25 q12h  - Hydralazine 100 mg TID  - Norvasc 10 mg/D  - Isordil would be next agent added    #NSTEMI, Demand Ischemia, resolved  - Initially, troponins mildly elevated  - TTE (4/30): EF 72, normal, no regional wall motion abnormalities   - No need to pursue cath at this time   - 20 mg lipitor at bedtime     #IDDM2, A1c 6.9  - NPH 4u q6h  - FS, ISS TIDAC    #R Common Iliac DVT  - Initially treated with heparin but then developed questionable GIB, transitioned to Eliquis   - IR was consulted, no plan for IVC filter  - Continue Eliquis 5 mg BID     #GI  - Concern melena 5/27, resolved, transfused   - GI consulted, not candidate for endoscopy due to surgical risk  - Concern for upper GI Bleed, GI felt not true bleed  - Continue PPI BID   - MIralax 17g daily  - Senna 2 @ bedtime   - Ducolax suppository daily PRN     #Oropharyngeal Dysphagia  #s/p PEG (5/17)  - Failed green dye 6/1   - Continue feeds: Card Steady 10 cc/hr incr. q6 goal 45cc/hr     #Mood / Cognition:  - Neuropsych evaluation given prolonged and complex hospitalization  - Chart mentions concern for depression w/ possible suicidal ideation     #/Bladder:  - Check urinary status on arrival, possibly anuric     #Dysphagia:    - Diet: NPO/Tube Feeds  - Ongoing SLP assessment  - Nutrition to follow    #Skin/ Pressure Injury Prevention:  - Assessment on admission   - Incisions:    #Precautions/ Restrictions  - Falls, Aspiration Precautions   - COVID PCR:     --------------------------------------------dis  Outpatient Follow up:    Prachi Omer  Internal Medicine  [address]  [place] [address]  Phone: [phone]  Fax: [phone]  Established Patient  Follow Up Time: 1 week    Geovani Bravo  Internal Medicine  [address] [place] [address]  Phone: [phone]  Fax: [phone]  Follow Up Time:     Luis F Stallworth  Gastroenterology  [address], [address]  [place] [address]  Phone: [phone]  Fax: [phone]  Follow Up Time:     Adolfo Garcia  Nephrology  [address] [place] [address]  Phone: [phone]  Fax: [phone]  Follow Up Time:     Kellee Mora  Neurology  [address], [address]  [place] [address]  Phone: [phone]  Fax: [phone]  Follow Up Time: 1 week    Jessica Lockett  Vascular Surgery  [address], [address]  [place] [address]  Phone: [phone]  Fax: [phone]  Follow Up Time: 2 weeks      --------------------------------------------      MEDICAL PROGNOSIS: GOOD            REHAB POTENTIAL: GOOD             ESTIMATED DISPOSITION: HOME WITH HOME CARE            ELOS: 10-14 Days   EXPECTED THERAPY:     P.T. 1hr/day       O.T. 1hr/day      S.L.P. 1hr/day     P&O Unnecessary     EXP FREQUENCY: 5 days per 7 day period     PRESCREEN COMPARISON:   I have reviewed the prescreen information and I have found no relevant changes between the preadmission screening and my post admission evaluation     RATIONALE FOR INPATIENT ADMISSION - Patient demonstrates the following: (check all that apply)  [X] Medically appropriate for rehabilitation admission  [X] Has attainable rehab goals with an appropriate initial discharge plan  [X] Has rehabilitation potential (expected to make a significant improvement within a reasonable period of time)   [X] Requires close medical management by a rehab physician, rehab nursing care, Hospitalist and comprehensive interdisciplinary team (including PT, OT, & or SLP, Prosthetics and Orthotics)  73-year-old male w/ past medical history hypertension, ESRD (HD MWF)  insulin-dependent DM presented 4/29/24 to [facility] 4/29 with hiccups and demand ischemia, was treated with baclofen, developed what appears to be toxic encephalopathy. He then sustained a prolonged, complex hospital course over approx 6 weeks, notable for acute respiratory failure, intubation, sepsis, CRRT, DVT, GIB, trach and PEG placement, He is admitted for acute multidisciplinary rehab on 6/14/24 to [facility].       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  #Hospital Acquired Debility   #Dysphagia  - ASA81 qD  - Absolute Avoidance of Baclofen   - Comprehensive Multidisciplinary Rehab  - NPO/Tube Feeds as below    #Acute Hypoxemic, Hypercapnic Respiratory Failure, s/p ETT intubation  #s/p tracheostomy   - Albuterol neb q6h   - Continue TC w/ ATC  - recommend Pulm consult    #ESRD on HD  #Fistula stenosis   #Anemia   - Continue HD M/W/F per renal   - Access is RUE AVF  - Regular monitoring of electrolytes  - EPOGEN w/ HD  - Iron supplementation, 300 mg daily   - Nephro consult    #HTN  - Coreg 25 q12h  - Hydralazine 100 mg TID  - Norvasc 10 mg/D  - Isordil would be next agent added for Bp control    #NSTEMI, Demand Ischemia, resolved  - Initially, troponins were mildly elevated  - TTE (4/30): EF 72, normal, no regional wall motion abnormalities   - lipitor 20 mg, Coreg 25mg    #IDDM2, A1c 6.9  - NPH 4u q6h  - FS, ISS TIDAC    #R Common Iliac DVT  - CT AP (5/12) with nonocclusive RIGHT common iliac vein deep venous thrombosis  - Initially treated with heparin but then developed questionable GIB, transitioned to Eliquis   - IR was consulted, no plan for IVC filter  - Continue Eliquis 5 mg BID       #Oropharyngeal Dysphagia  #s/p PEG (5/17)  - Failed green dye 6/1   - Continue feeds: 10 cc/hr incr. q6 with goal 45cc/hr

## 2024-06-15 NOTE — DIETITIAN INITIAL EVALUATION ADULT - PERTINENT LABORATORY DATA
06-15    133<L>  |  97  |  51<H>  ----------------------------<  154<H>  4.1   |  29  |  3.88<H>    Ca    8.6      15 Eze 2024 05:14    TPro  7.1  /  Alb  2.5<L>  /  TBili  0.4  /  DBili  x   /  AST  20  /  ALT  24  /  AlkPhos  201<H>  06-15  POCT Blood Glucose.: 122 mg/dL (06-14-24 @ 22:28)  A1C with Estimated Average Glucose Result: 6.9 % (04-30-24 @ 06:03)  A1C with Estimated Average Glucose Result: 14.0 % (01-15-24 @ 13:36)

## 2024-06-15 NOTE — DIETITIAN INITIAL EVALUATION ADULT - SIGNS/SYMPTOMS
NFPE show mild to severe muscle and fat loss, <75% PO intake x >1 month, % wt loss x 1 month. on TF as primary means of nutrition NFPE show mild to severe muscle and fat loss, <75% PO intake x >1 month, 75% of IBW

## 2024-06-15 NOTE — DIETITIAN INITIAL EVALUATION ADULT - ADD RECOMMEND
Suggest adding Nephrovite  Maintain bed 45 degree during and 1 hr after feeding.   Observe tolerance to prescribed formula.   Monitor electrolytes, glucose and weights to adjust feedings if necessary.   Advance diet to PO as per SLP recommendations when medically feasible.

## 2024-06-15 NOTE — DIETITIAN INITIAL EVALUATION ADULT - NUTRITIONGOAL OUTCOME2
Pt will advance diet to PO and meet 75% or > of estimated nutritional needs during hospitalization.

## 2024-06-15 NOTE — DIETITIAN INITIAL EVALUATION ADULT - OTHER CALCULATIONS
IBW: 154 Lbs +/-10%   Used for calculations IBW-10%: 139 Lbs   Fluids needs as per MD secondary to ESRD on HD IBW: 154 Lbs +/-10%   Used for calculations IBW-10%: 139 Lbs   Fluids needs to be review by MD secondary to ESRD on HD.

## 2024-06-16 LAB
GLUCOSE BLDC GLUCOMTR-MCNC: 163 MG/DL — HIGH (ref 70–99)
GLUCOSE BLDC GLUCOMTR-MCNC: 175 MG/DL — HIGH (ref 70–99)
GLUCOSE BLDC GLUCOMTR-MCNC: 176 MG/DL — HIGH (ref 70–99)
GLUCOSE BLDC GLUCOMTR-MCNC: 204 MG/DL — HIGH (ref 70–99)
GLUCOSE BLDC GLUCOMTR-MCNC: 95 MG/DL — SIGNIFICANT CHANGE UP (ref 70–99)

## 2024-06-16 PROCEDURE — 99232 SBSQ HOSP IP/OBS MODERATE 35: CPT

## 2024-06-16 RX ORDER — CARVEDILOL PHOSPHATE 80 MG/1
25 CAPSULE, EXTENDED RELEASE ORAL EVERY 12 HOURS
Refills: 0 | Status: DISCONTINUED | OUTPATIENT
Start: 2024-06-16 | End: 2024-07-10

## 2024-06-16 RX ADMIN — INSULIN LISPRO 1: 100 INJECTION, SOLUTION SUBCUTANEOUS at 00:17

## 2024-06-16 RX ADMIN — APIXABAN 5 MILLIGRAM(S): 5 TABLET, FILM COATED ORAL at 18:01

## 2024-06-16 RX ADMIN — Medication 4 UNIT(S): at 23:30

## 2024-06-16 RX ADMIN — HYDRALAZINE HYDROCHLORIDE 100 MILLIGRAM(S): 50 TABLET ORAL at 22:11

## 2024-06-16 RX ADMIN — Medication 2.5 MILLIGRAM(S): at 15:41

## 2024-06-16 RX ADMIN — APIXABAN 5 MILLIGRAM(S): 5 TABLET, FILM COATED ORAL at 06:33

## 2024-06-16 RX ADMIN — LIDOCAINE HCL 2 PATCH: 28 GEL TOPICAL at 12:30

## 2024-06-16 RX ADMIN — Medication 2.5 MILLIGRAM(S): at 21:04

## 2024-06-16 RX ADMIN — CARVEDILOL PHOSPHATE 25 MILLIGRAM(S): 80 CAPSULE, EXTENDED RELEASE ORAL at 18:01

## 2024-06-16 RX ADMIN — CARVEDILOL PHOSPHATE 25 MILLIGRAM(S): 80 CAPSULE, EXTENDED RELEASE ORAL at 06:32

## 2024-06-16 RX ADMIN — ASPIRIN 81 MILLIGRAM(S): 325 TABLET, FILM COATED ORAL at 12:30

## 2024-06-16 RX ADMIN — PANTOPRAZOLE SODIUM 40 MILLIGRAM(S): 40 INJECTION, POWDER, FOR SOLUTION INTRAVENOUS at 06:33

## 2024-06-16 RX ADMIN — INSULIN LISPRO 2: 100 INJECTION, SOLUTION SUBCUTANEOUS at 23:31

## 2024-06-16 RX ADMIN — Medication 300 MILLIGRAM(S): at 12:30

## 2024-06-16 RX ADMIN — Medication 650 MILLIGRAM(S): at 23:00

## 2024-06-16 RX ADMIN — HYDRALAZINE HYDROCHLORIDE 100 MILLIGRAM(S): 50 TABLET ORAL at 06:33

## 2024-06-16 RX ADMIN — AMLODIPINE BESYLATE 10 MILLIGRAM(S): 2.5 TABLET ORAL at 06:32

## 2024-06-16 RX ADMIN — POLYETHYLENE GLYCOL 3350 17 GRAM(S): 1 POWDER ORAL at 12:30

## 2024-06-16 RX ADMIN — Medication 650 MILLIGRAM(S): at 12:31

## 2024-06-16 RX ADMIN — HYDRALAZINE HYDROCHLORIDE 100 MILLIGRAM(S): 50 TABLET ORAL at 16:14

## 2024-06-16 RX ADMIN — Medication 2.5 MILLIGRAM(S): at 08:31

## 2024-06-16 RX ADMIN — Medication 1 APPLICATION(S): at 06:52

## 2024-06-16 RX ADMIN — PANTOPRAZOLE SODIUM 40 MILLIGRAM(S): 40 INJECTION, POWDER, FOR SOLUTION INTRAVENOUS at 18:01

## 2024-06-16 RX ADMIN — Medication 2.5 MILLIGRAM(S): at 05:26

## 2024-06-16 RX ADMIN — INSULIN LISPRO 1: 100 INJECTION, SOLUTION SUBCUTANEOUS at 06:40

## 2024-06-16 RX ADMIN — INSULIN LISPRO 1: 100 INJECTION, SOLUTION SUBCUTANEOUS at 18:01

## 2024-06-16 RX ADMIN — Medication 2 TABLET(S): at 22:11

## 2024-06-16 RX ADMIN — Medication 650 MILLIGRAM(S): at 22:19

## 2024-06-16 RX ADMIN — Medication 4 UNIT(S): at 01:09

## 2024-06-16 RX ADMIN — Medication 4 UNIT(S): at 18:00

## 2024-06-16 RX ADMIN — ATORVASTATIN CALCIUM 20 MILLIGRAM(S): 20 TABLET, FILM COATED ORAL at 22:10

## 2024-06-16 RX ADMIN — LIDOCAINE HCL 2 PATCH: 28 GEL TOPICAL at 20:19

## 2024-06-16 RX ADMIN — Medication 4 UNIT(S): at 06:40

## 2024-06-16 RX ADMIN — Medication 1 TABLET(S): at 12:30

## 2024-06-16 NOTE — PROGRESS NOTE ADULT - SUBJECTIVE AND OBJECTIVE BOX
|-------------------------------------------|                       Subjective   |-------------------------------------------|    No events overnight    |------------------------------------------|                       Objective  |------------------------------------------|    Vital Signs Last 24 Hrs  T(F): 98.2 (16 Jun 2024 07:28), Max: 98.2 (16 Jun 2024 07:28)  HR: 55 (16 Jun 2024 07:28) (55 - 60)  BP: 137/64 (16 Jun 2024 07:28) (137/64 - 157/63)  RR: 16 (16 Jun 2024 07:28) (14 - 16)  SpO2: 98% (16 Jun 2024 09:40) (93% - 98%)  I&O's Summary    15 Eze 2024 07:01  -  16 Jun 2024 07:00  --------------------------------------------------------  IN: 360 mL / OUT: 0 mL / NET: 360 mL        Examination:   General: NAD, Comfortable  Pulm: CTA BL No Rhonchi Rales or wheezes  Neck: Supple, No JVD  CVS: RRR No rubs murmurs or gallops  Abdomen: Soft, Nontender, Nondistended; No masses or organomegally  Extremities: No calf tenderness, No pitting edema    Labs:                        9.7    9.36  )-----------( 328      ( 15 Eze 2024 05:14 )             31.0       06-15    133  |  97  |  51  ----------------------------<  154  4.1   |  29  |  3.88    Ca    8.6      15 Eze 2024 05:14    TPro  7.1  /  Alb  2.5  /  TBili  0.4  /  DBili  x   /  AST  20  /  ALT  24  /  AlkPhos  201  06-15              POCT Blood Glucose.: 175 mg/dL (16 Jun 2024 06:37)  POCT Blood Glucose.: 163 mg/dL (16 Jun 2024 00:09)  POCT Blood Glucose.: 89 mg/dL (15 Eze 2024 17:10)  POCT Blood Glucose.: 223 mg/dL (15 Eze 2024 11:54)

## 2024-06-16 NOTE — CONSULT NOTE ADULT - SUBJECTIVE AND OBJECTIVE BOX
Patient chart reviewed, full consult to follow.     Thank you for the courtesy of this consultation. [facility] NEPHROLOGY SERVICES, [facility]  NEPHROLOGY AND HYPERTENSION  [address]  [address]  [place] [address]  [phone]    MD RONNA CHANG MD YELENA ROSENBERG, MD BINNY KOSHY, MD CHRISTOPHER CAPUTO, MD EDWARD BOVER, MD      Information from chart:  "Patient is a 73y old  Male who presents with a chief complaint of Cerebral infarction     (15 Eze 2024 08:12)    HPI:  73-year-old male w/ past medical history hypertension, ESRD (HD MWF)  insulin-dependent DM presented 24 to [facility] with hiccups, chest pain of several days duration, admitted for concern demand ischemia. Early on, he was givenv baclofen for his hiccups, but soon developed AMS with 2 RRTs by 24, prompting intubation, upgrade to MICU. There was concern that baclofen toxicity was the culprit of his then-encephalopathic state, as patient had also missed a HD session due to a clotted fistula.     In coming days, he was treated empirically with 5 days of zosyn, had a negative CTH and LP, and had an EEG that did not capture a seizure but showed diffuse non-specific cerebral dysfunction - c/w toxic-metabolic encephalopathies. On , an MRI showed that the patient had sustained R pontine and cerebellar stroke. He was extubated on , but owing to a challenging extubation became septic w/ B Cereus, treated with Vanc. He had trach placed , PEG , then downgraded.     On the floors, he was followed by several services, including Neurology, who mention the strokes were embolic appearing. Subsequent hospital course notable for nonocclusive R common iliac DVT, for which he was started on a heparin drip which was stopped when patient developed an upper GI Bleed, though GI was consulted and felt he did not have an overt bleed. His dialysis fistula site was stenotic, s/p fistulogram, ultimately changed to a RUE AVF . He was ultimately started on Eliquis  for the DVT. Medically stable, he is admitted to [facility] on 24.  (2024 14:22)   "    No distress  Hospital course noted     PAST MEDICAL & SURGICAL HISTORY:  Diabetes      Benign essential HTN      HLD (hyperlipidemia)      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      ESRD on hemodialysis      Arteriovenous fistula        FAMILY HISTORY:  FHx: diabetes mellitus (Father, Aunt)      Allergies    No Known Allergies    Intolerances      Home Medications:  acetaminophen 160 mg/5 mL oral suspension: 20.31 milliliter(s) orally every 6 hours as needed for Temp greater or equal to 38C (100.4F), Moderate Pain (4 - 6) (2024 11:53)  albuterol 2.5 mg/3 mL (0.083%) inhalation solution: 3 milliliter(s) inhaled every 6 hours (2024 11:53)  amLODIPine 10 mg oral tablet: 1 tab(s) orally once a day (2024 11:53)  apixaban 5 mg oral tablet: 1 tab(s) orally every 12 hours (2024 11:53)  aspirin 81 mg oral tablet, chewable: 1 tab(s) orally once a day (2024 11:53)  atorvastatin 20 mg oral tablet: 1 tab(s) orally once a day (2024 21:17)  carvedilol 25 mg oral tablet: 1 tab(s) orally every 12 hours (2024 11:53)  epoetin reilly: 10,000 unit(s) intravenous 3 times a week 10,000u IVP -- with HD (2024 12:25)  ferrous sulfate 300 mg/5 mL (60 mg/5 mL elemental iron) oral liquid: 5 milliliter(s) orally once a day (2024 11:53)  hydrALAZINE 100 mg oral tablet: 1 tab(s) orally 3 times a day (2024 12:01)  insulin isophane (NPH) 100 units/mL human recombinant subcutaneous suspension: 4 unit(s) subcutaneous every 6 hours (2024 12:25)  insulin lispro 100 units/mL injectable solution: injectable 4 times a day Accuchecks every 6 h  2 Unit(s) if Glucose 151 - 200  4 Unit(s) if Glucose 201 - 250  6 Unit(s) if Glucose 251 - 300  8 Unit(s) if Glucose 301 - 350  10 Unit(s) if Glucose 351 - 400  12 Unit(s) if Glucose Greater Than 400 (2024 12:25)  lidocaine 4% topical film: Apply topically to affected area once a day (2024 11:53)  polyethylene glycol 3350 oral powder for reconstitution: 17 gram(s) orally once a day (2024 11:53)  senna leaf extract oral tablet: 2 tab(s) orally once a day (at bedtime) (2024 11:53)    MEDICATIONS  (STANDING):  albuterol    0.083% 2.5 milliGRAM(s) Nebulizer every 6 hours  amLODIPine   Tablet 10 milliGRAM(s) Oral daily  apixaban 5 milliGRAM(s) Enteral Tube two times a day  aspirin  chewable 81 milliGRAM(s) Oral daily  atorvastatin 20 milliGRAM(s) Oral at bedtime  carvedilol 25 milliGRAM(s) Oral every 12 hours  chlorhexidine 2% Cloths 1 Application(s) Topical <User Schedule>  dextrose 10% Bolus 125 milliLiter(s) IV Bolus once  dextrose 5%. 1000 milliLiter(s) (100 mL/Hr) IV Continuous <Continuous>  dextrose 5%. 1000 milliLiter(s) (50 mL/Hr) IV Continuous <Continuous>  dextrose 50% Injectable 25 Gram(s) IV Push once  dextrose 50% Injectable 12.5 Gram(s) IV Push once  epoetin reilly-epbx (RETACRIT) Injectable 64081 Unit(s) IV Push <User Schedule>  ferrous    sulfate Liquid 300 milliGRAM(s) Enteral Tube daily  glucagon  Injectable 1 milliGRAM(s) IntraMuscular once  hydrALAZINE 100 milliGRAM(s) Oral three times a day  insulin lispro (ADMELOG) corrective regimen sliding scale   SubCutaneous every 6 hours  insulin NPH human recombinant 4 Unit(s) SubCutaneous every 6 hours  lidocaine   4% Patch 2 Patch Transdermal every 24 hours  Nephro-noam 1 Tablet(s) Oral daily  pantoprazole   Suspension 40 milliGRAM(s) Enteral Tube two times a day  polyethylene glycol 3350 17 Gram(s) Oral daily  senna 2 Tablet(s) Oral at bedtime    MEDICATIONS  (PRN):  acetaminophen   Oral Liquid .. 650 milliGRAM(s) Oral every 6 hours PRN Moderate Pain (4 - 6)  bisacodyl Suppository 10 milliGRAM(s) Rectal daily PRN Constipation  dextrose Oral Gel 15 Gram(s) Oral once PRN Blood Glucose LESS THAN 70 milliGRAM(s)/deciliter  melatonin 3 milliGRAM(s) Oral at bedtime PRN Insomnia    Vital Signs Last 24 Hrs  T(C): 36.8 (2024 07:28), Max: 36.8 (2024 07:28)  T(F): 98.2 (2024 07:28), Max: 98.2 (2024 07:28)  HR: 57 (2024 18:01) (55 - 64)  BP: 169/66 (2024 18:01) (137/64 - 192/72)  BP(mean): --  RR: 16 (2024 07:28) (14 - 16)  SpO2: 98% (2024 09:40) (93% - 98%)    Parameters below as of 2024 08:50  Patient On (Oxygen Delivery Method): tracheostomy collar        Daily     Daily Weight in k.6 (2024 06:26)    06-15-24 @ 07:01  -  24 @ 07:00  --------------------------------------------------------  IN: 360 mL / OUT: 0 mL / NET: 360 mL      CAPILLARY BLOOD GLUCOSE      POCT Blood Glucose.: 176 mg/dL (2024 17:56)  POCT Blood Glucose.: 95 mg/dL (2024 12:21)  POCT Blood Glucose.: 175 mg/dL (2024 06:37)  POCT Blood Glucose.: 163 mg/dL (2024 00:09)    PHYSICAL EXAM:      T(C): 36.8 (24 @ 07:28), Max: 36.8 (24 @ 07:28)  HR: 57 (24 @ 18:01) (55 - 64)  BP: 169/66 (24 @ 18:01) (137/64 - 192/72)  RR: 16 (24 @ 07:28) (14 - 16)  SpO2: 98% (24 @ 09:40) (93% - 98%)  Wt(kg): --  Lungs clear  Heart S1S2  Abd soft NT ND  Extremities:   tr edema  RUE + avf bruit and thrill              06-15    133<L>  |  97  |  51<H>  ----------------------------<  154<H>  4.1   |  29  |  3.88<H>    Ca    8.6      15 Eze 2024 05:14    TPro  7.1  /  Alb  2.5<L>  /  TBili  0.4  /  DBili  x   /  AST  20  /  ALT  24  /  AlkPhos  201<H>  06-15                          9.7    9.36  )-----------( 328      ( 15 Eze 2024 05:14 )             31.0     Creatinine Trend: 3.88<--, 3.51<--, 3.72<--, 3.60<--, 5.16<--, 2.77<--  Urinalysis Basic - ( 15 Eze 2024 05:14 )    Color: x / Appearance: x / SG: x / pH: x  Gluc: 154 mg/dL / Ketone: x  / Bili: x / Urobili: x   Blood: x / Protein: x / Nitrite: x   Leuk Esterase: x / RBC: x / WBC x   Sq Epi: x / Non Sq Epi: x / Bacteria: x      Assessment   ESRD, maintenance    Plan  HD for tomorrow via AVF  Will follow course.    Delonte Moya MD        Patient chart reviewed, full consult to follow.     Thank you for the courtesy of this consultation.

## 2024-06-16 NOTE — PROGRESS NOTE ADULT - ASSESSMENT
73M HTN, ESRD (HD MWF via AVG) DM  Admit April29 with hiccups, Unstable angina, Developed AMS  Required intubation, ICU stay with CRRT, Acute ischemic CVA, DVT, GIB, Sepsis, trach and PEG placement, AVF dysfunction requiring balloon angioplasty   Tx to acute rehab 6/14/24    Ischemic CVA and toxic encephalopathy  -Concerns for baclofen toxicity thus held  -ASA/Statin  - NPO/Tube Feeds as below    Acute Hypoxemic, Hypercapnic Respiratory Failure, s/p ETT intubation  - s/p tracheostomy   - Albuterol neb q6h   - Continue TC w/ ATC  - Pulm consult    ESRD on HD MWF via RT AFF  SP AVF dysfunction requiring balloon angioplasty  Functioning fine RN  Nephro following    HTN  - Coreg 25 q12h  - Hydralazine 100 mg TID  - Norvasc 10 mg/D  - BP control per nephro    DM  - A1c 6.9  - NPH 4u q6h  - FS, ISS TIDAC    R Common Iliac DVT  - CT AP (5/12) with nonocclusive RIGHT common iliac vein deep venous thrombosis  - Initially treated with heparin but then developed questionable GIB, transitioned to Eliquis   - IR was consulted, no plan for IVC filter  - Continue Eliquis 5 mg BID       #Oropharyngeal Dysphagia  - s/p PEG (5/17)  - Failed green dye 6/1   - Continue feeds: 10 cc/hr incr. q6 with goal 45cc/hr

## 2024-06-16 NOTE — PROGRESS NOTE ADULT - SUBJECTIVE AND OBJECTIVE BOX
Pt. seen and examined at bedside.  No overnight events.      REVIEW OF SYSTEMS  Constitutional - No fever,  No fatigue  Neurological - No headaches, ++ loss of strength  Musculoskeletal - No joint pain, No joint swelling, No muscle pain    VITALS  T(C): 36.8 (06-16-24 @ 07:28), Max: 36.8 (06-16-24 @ 07:28)  HR: 55 (06-16-24 @ 07:28) (55 - 60)  BP: 137/64 (06-16-24 @ 07:28) (137/64 - 157/63)  RR: 16 (06-16-24 @ 07:28) (14 - 16)  SpO2: 96% (06-16-24 @ 07:28) (93% - 98%)  Wt(kg): --       MEDICATIONS   acetaminophen   Oral Liquid .. 650 milliGRAM(s) every 6 hours PRN  albuterol    0.083% 2.5 milliGRAM(s) every 6 hours  amLODIPine   Tablet 10 milliGRAM(s) daily  apixaban 5 milliGRAM(s) two times a day  aspirin  chewable 81 milliGRAM(s) daily  atorvastatin 20 milliGRAM(s) at bedtime  bisacodyl Suppository 10 milliGRAM(s) daily PRN  carvedilol 25 milliGRAM(s) every 12 hours  chlorhexidine 2% Cloths 1 Application(s) <User Schedule>  dextrose 10% Bolus 125 milliLiter(s) once  dextrose 5%. 1000 milliLiter(s) <Continuous>  dextrose 5%. 1000 milliLiter(s) <Continuous>  dextrose 50% Injectable 25 Gram(s) once  dextrose 50% Injectable 12.5 Gram(s) once  dextrose Oral Gel 15 Gram(s) once PRN  epoetin reilly-epbx (RETACRIT) Injectable 12579 Unit(s) <User Schedule>  ferrous    sulfate Liquid 300 milliGRAM(s) daily  glucagon  Injectable 1 milliGRAM(s) once  hydrALAZINE 100 milliGRAM(s) three times a day  insulin lispro (ADMELOG) corrective regimen sliding scale   every 6 hours  insulin NPH human recombinant 4 Unit(s) every 6 hours  lidocaine   4% Patch 2 Patch every 24 hours  melatonin 3 milliGRAM(s) at bedtime PRN  Nephro-noam 1 Tablet(s) daily  pantoprazole   Suspension 40 milliGRAM(s) two times a day  polyethylene glycol 3350 17 Gram(s) daily  senna 2 Tablet(s) at bedtime      RECENT LABS/IMAGING                        9.7    9.36  )-----------( 328      ( 15 Eze 2024 05:14 )             31.0     06-15    133<L>  |  97  |  51<H>  ----------------------------<  154<H>  4.1   |  29  |  3.88<H>    Ca    8.6      15 Eze 2024 05:14    TPro  7.1  /  Alb  2.5<L>  /  TBili  0.4  /  DBili  x   /  AST  20  /  ALT  24  /  AlkPhos  201<H>  06-15      Urinalysis Basic - ( 15 Eze 2024 05:14 )    Color: x / Appearance: x / SG: x / pH: x  Gluc: 154 mg/dL / Ketone: x  / Bili: x / Urobili: x   Blood: x / Protein: x / Nitrite: x   Leuk Esterase: x / RBC: x / WBC x   Sq Epi: x / Non Sq Epi: x / Bacteria: x              POCT Blood Glucose.: 175 mg/dL (06-16-24 @ 06:37)  POCT Blood Glucose.: 163 mg/dL (06-16-24 @ 00:09)  POCT Blood Glucose.: 89 mg/dL (06-15-24 @ 17:10)  POCT Blood Glucose.: 223 mg/dL (06-15-24 @ 11:54)    ---------  PHYSICAL EXAM  Constitutional - NAD, Comfortable  Pulm - Breathing comfortably; + trach collar w/ speaking valve  Abd - Soft, NTND, PEG  Extremities - No edema, No calf tenderness  Neurologic Exam -                    Cognitive - Awake, Alert     Communication - HYPOPHONIC     Motor - DEFERRED     Sensory - DEFERRED  Psychiatric - DEFERRED    ASSESSMENT/PLAN  73y Male with functional deficits 2' toxic encephalopathy (ie, Baclofen)  Continue current medical management  Pain - Tylenol PRN  DVT PPX - apixaban 5 milliGRAM(s) two times a day  Active issues - labs reviewed; CRF on HD  Continue 3hrs a day of comprehensive rehab program.

## 2024-06-17 ENCOUNTER — NON-APPOINTMENT (OUTPATIENT)
Age: 74
End: 2024-06-17

## 2024-06-17 LAB
ALBUMIN SERPL ELPH-MCNC: 2.6 G/DL — LOW (ref 3.3–5)
ALP SERPL-CCNC: 275 U/L — HIGH (ref 40–120)
ALT FLD-CCNC: 22 U/L — SIGNIFICANT CHANGE UP (ref 10–45)
ANION GAP SERPL CALC-SCNC: 7 MMOL/L — SIGNIFICANT CHANGE UP (ref 5–17)
AST SERPL-CCNC: 24 U/L — SIGNIFICANT CHANGE UP (ref 10–40)
BASOPHILS # BLD AUTO: 0.03 K/UL — SIGNIFICANT CHANGE UP (ref 0–0.2)
BASOPHILS NFR BLD AUTO: 0.4 % — SIGNIFICANT CHANGE UP (ref 0–2)
BILIRUB SERPL-MCNC: 0.4 MG/DL — SIGNIFICANT CHANGE UP (ref 0.2–1.2)
BUN SERPL-MCNC: 95 MG/DL — HIGH (ref 7–23)
CALCIUM SERPL-MCNC: 8.4 MG/DL — SIGNIFICANT CHANGE UP (ref 8.4–10.5)
CHLORIDE SERPL-SCNC: 89 MMOL/L — LOW (ref 96–108)
CO2 SERPL-SCNC: 28 MMOL/L — SIGNIFICANT CHANGE UP (ref 22–31)
CREAT SERPL-MCNC: 5.97 MG/DL — HIGH (ref 0.5–1.3)
EGFR: 9 ML/MIN/1.73M2 — LOW
EOSINOPHIL # BLD AUTO: 0.2 K/UL — SIGNIFICANT CHANGE UP (ref 0–0.5)
EOSINOPHIL NFR BLD AUTO: 2.6 % — SIGNIFICANT CHANGE UP (ref 0–6)
GLUCOSE BLDC GLUCOMTR-MCNC: 115 MG/DL — HIGH (ref 70–99)
GLUCOSE BLDC GLUCOMTR-MCNC: 166 MG/DL — HIGH (ref 70–99)
GLUCOSE BLDC GLUCOMTR-MCNC: 166 MG/DL — HIGH (ref 70–99)
GLUCOSE BLDC GLUCOMTR-MCNC: 98 MG/DL — SIGNIFICANT CHANGE UP (ref 70–99)
GLUCOSE SERPL-MCNC: 180 MG/DL — HIGH (ref 70–99)
HCT VFR BLD CALC: 28.6 % — LOW (ref 39–50)
HGB BLD-MCNC: 9.6 G/DL — LOW (ref 13–17)
IMM GRANULOCYTES NFR BLD AUTO: 0.4 % — SIGNIFICANT CHANGE UP (ref 0–0.9)
LYMPHOCYTES # BLD AUTO: 0.73 K/UL — LOW (ref 1–3.3)
LYMPHOCYTES # BLD AUTO: 9.6 % — LOW (ref 13–44)
MCHC RBC-ENTMCNC: 23.4 PG — LOW (ref 27–34)
MCHC RBC-ENTMCNC: 33.6 GM/DL — SIGNIFICANT CHANGE UP (ref 32–36)
MCV RBC AUTO: 69.8 FL — LOW (ref 80–100)
MONOCYTES # BLD AUTO: 0.46 K/UL — SIGNIFICANT CHANGE UP (ref 0–0.9)
MONOCYTES NFR BLD AUTO: 6.1 % — SIGNIFICANT CHANGE UP (ref 2–14)
NEUTROPHILS # BLD AUTO: 6.15 K/UL — SIGNIFICANT CHANGE UP (ref 1.8–7.4)
NEUTROPHILS NFR BLD AUTO: 80.9 % — HIGH (ref 43–77)
NRBC # BLD: 0 /100 WBCS — SIGNIFICANT CHANGE UP (ref 0–0)
PHOSPHATE SERPL-MCNC: 4.9 MG/DL — HIGH (ref 2.5–4.5)
PLATELET # BLD AUTO: 333 K/UL — SIGNIFICANT CHANGE UP (ref 150–400)
POTASSIUM SERPL-MCNC: 4.8 MMOL/L — SIGNIFICANT CHANGE UP (ref 3.5–5.3)
POTASSIUM SERPL-SCNC: 4.8 MMOL/L — SIGNIFICANT CHANGE UP (ref 3.5–5.3)
PROT SERPL-MCNC: 6.9 G/DL — SIGNIFICANT CHANGE UP (ref 6–8.3)
RBC # BLD: 4.1 M/UL — LOW (ref 4.2–5.8)
RBC # FLD: 22.6 % — HIGH (ref 10.3–14.5)
SODIUM SERPL-SCNC: 124 MMOL/L — LOW (ref 135–145)
WBC # BLD: 7.6 K/UL — SIGNIFICANT CHANGE UP (ref 3.8–10.5)
WBC # FLD AUTO: 7.6 K/UL — SIGNIFICANT CHANGE UP (ref 3.8–10.5)

## 2024-06-17 PROCEDURE — 99232 SBSQ HOSP IP/OBS MODERATE 35: CPT

## 2024-06-17 PROCEDURE — 99232 SBSQ HOSP IP/OBS MODERATE 35: CPT | Mod: GC

## 2024-06-17 RX ORDER — MENTHOL, METHYL SALICYLATE 63; 210 MG/1; MG/1
1 PATCH PERCUTANEOUS; TOPICAL; TRANSDERMAL
Refills: 0 | Status: DISCONTINUED | OUTPATIENT
Start: 2024-06-17 | End: 2024-07-10

## 2024-06-17 RX ORDER — LIDOCAINE HCL 28 MG/G
2 GEL TOPICAL AT BEDTIME
Refills: 0 | Status: DISCONTINUED | OUTPATIENT
Start: 2024-06-17 | End: 2024-06-17

## 2024-06-17 RX ORDER — LIDOCAINE HCL 28 MG/G
1 GEL TOPICAL EVERY 24 HOURS
Refills: 0 | Status: DISCONTINUED | OUTPATIENT
Start: 2024-06-17 | End: 2024-07-10

## 2024-06-17 RX ORDER — INSULIN LISPRO 100 [IU]/ML
INJECTION, SOLUTION SUBCUTANEOUS
Refills: 0 | Status: DISCONTINUED | OUTPATIENT
Start: 2024-06-17 | End: 2024-06-23

## 2024-06-17 RX ADMIN — ASPIRIN 81 MILLIGRAM(S): 325 TABLET, FILM COATED ORAL at 12:13

## 2024-06-17 RX ADMIN — Medication 1 TABLET(S): at 12:13

## 2024-06-17 RX ADMIN — LIDOCAINE HCL 1 PATCH: 28 GEL TOPICAL at 18:52

## 2024-06-17 RX ADMIN — APIXABAN 5 MILLIGRAM(S): 5 TABLET, FILM COATED ORAL at 18:51

## 2024-06-17 RX ADMIN — Medication 2.5 MILLIGRAM(S): at 08:12

## 2024-06-17 RX ADMIN — Medication 300 MILLIGRAM(S): at 12:12

## 2024-06-17 RX ADMIN — ATORVASTATIN CALCIUM 20 MILLIGRAM(S): 20 TABLET, FILM COATED ORAL at 21:58

## 2024-06-17 RX ADMIN — INSULIN LISPRO 1: 100 INJECTION, SOLUTION SUBCUTANEOUS at 12:12

## 2024-06-17 RX ADMIN — Medication 650 MILLIGRAM(S): at 08:52

## 2024-06-17 RX ADMIN — LIDOCAINE HCL 1 PATCH: 28 GEL TOPICAL at 22:00

## 2024-06-17 RX ADMIN — CARVEDILOL PHOSPHATE 25 MILLIGRAM(S): 80 CAPSULE, EXTENDED RELEASE ORAL at 21:59

## 2024-06-17 RX ADMIN — HYDRALAZINE HYDROCHLORIDE 100 MILLIGRAM(S): 50 TABLET ORAL at 21:59

## 2024-06-17 RX ADMIN — Medication 1 APPLICATION(S): at 06:48

## 2024-06-17 RX ADMIN — PANTOPRAZOLE SODIUM 40 MILLIGRAM(S): 40 INJECTION, POWDER, FOR SOLUTION INTRAVENOUS at 18:51

## 2024-06-17 RX ADMIN — Medication 2.5 MILLIGRAM(S): at 21:01

## 2024-06-17 RX ADMIN — APIXABAN 5 MILLIGRAM(S): 5 TABLET, FILM COATED ORAL at 06:45

## 2024-06-17 RX ADMIN — INSULIN LISPRO 1: 100 INJECTION, SOLUTION SUBCUTANEOUS at 06:46

## 2024-06-17 RX ADMIN — PANTOPRAZOLE SODIUM 40 MILLIGRAM(S): 40 INJECTION, POWDER, FOR SOLUTION INTRAVENOUS at 06:45

## 2024-06-17 RX ADMIN — Medication 4 UNIT(S): at 12:18

## 2024-06-17 RX ADMIN — ERYTHROPOIETIN 10000 UNIT(S): 4000 INJECTION, SOLUTION INTRAVENOUS; SUBCUTANEOUS at 06:43

## 2024-06-17 RX ADMIN — Medication 650 MILLIGRAM(S): at 09:30

## 2024-06-17 RX ADMIN — LIDOCAINE HCL 2 PATCH: 28 GEL TOPICAL at 01:44

## 2024-06-17 RX ADMIN — LIDOCAINE HCL 1 PATCH: 28 GEL TOPICAL at 18:51

## 2024-06-17 RX ADMIN — Medication 2.5 MILLIGRAM(S): at 05:27

## 2024-06-17 RX ADMIN — AMLODIPINE BESYLATE 10 MILLIGRAM(S): 2.5 TABLET ORAL at 06:45

## 2024-06-17 RX ADMIN — MENTHOL, METHYL SALICYLATE 1 APPLICATION(S): 63; 210 PATCH PERCUTANEOUS; TOPICAL; TRANSDERMAL at 13:12

## 2024-06-17 RX ADMIN — Medication 4 UNIT(S): at 06:47

## 2024-06-17 RX ADMIN — HYDRALAZINE HYDROCHLORIDE 100 MILLIGRAM(S): 50 TABLET ORAL at 06:45

## 2024-06-17 RX ADMIN — HYDRALAZINE HYDROCHLORIDE 100 MILLIGRAM(S): 50 TABLET ORAL at 18:51

## 2024-06-17 RX ADMIN — CARVEDILOL PHOSPHATE 25 MILLIGRAM(S): 80 CAPSULE, EXTENDED RELEASE ORAL at 06:45

## 2024-06-17 NOTE — CHART NOTE - NSCHARTNOTEFT_GEN_A_CORE
Interim Nutrition Note - Will Trial Bolus Feeding w/ Patient. Discussed w/ Rehab Team & Nursing. Will Receive Nepro TID. Will Increase if Tolerates to 4xDay. Will Continue to Monitor & Follow up as Needed.

## 2024-06-17 NOTE — PROGRESS NOTE ADULT - SUBJECTIVE AND OBJECTIVE BOX
73-year-old male w/ past medical history hypertension, ESRD (HD MWF)  insulin-dependent DM presented 4/29/24 to [facility] with hiccups, chest pain of several days duration, admitted for concern demand ischemia. Early on, he was givenv baclofen for his hiccups, but soon developed AMS with 2 RRTs by 5/2/24, prompting intubation, upgrade to MICU. There was concern that baclofen toxicity was the culprit of his then-encephalopathic state, as patient had also missed a HD session due to a clotted fistula.     In coming days, he was treated empirically with 5 days of zosyn, had a negative CTH and LP, and had an EEG that did not capture a seizure but showed diffuse non-specific cerebral dysfunction - c/w toxic-metabolic encephalopathies. On 5/6, an MRI showed that the patient had sustained R pontine and cerebellar stroke. He was extubated on 5/9, but owing to a challenging extubation became septic w/ B Cereus, treated with Vanc. He had trach placed 5/14, PEG 5/17, then downgraded.     On the floors, he was followed by several services, including Neurology, who mention the strokes were embolic appearing. Subsequent hospital course notable for nonocclusive R common iliac DVT, for which he was started on a heparin drip which was stopped when patient developed an upper GI Bleed, though GI was consulted and felt he did not have an overt bleed. His dialysis fistula site was stenotic, s/p fistulogram, ultimately changed to a RUE AVF 6/5. He was ultimately started on Eliquis 6/7 for the DVT. Medically stable, he is admitted to [facility] on 6/14/24.     ROS/subjective:  patient seen and evaluated bedside  Reports significant knee pain Joao   Weakness Right > Left  Voiding, moving Bowels- last BM 6/17- incontinent  no dizziness, no headaches, no nausea, no vomiting, no SOB, no chest pain  + secretions- self suctions trach  wife at bedside to assist  for HD today    MEDICATIONS  (STANDING):  albuterol    0.083% 2.5 milliGRAM(s) Nebulizer every 6 hours  amLODIPine   Tablet 10 milliGRAM(s) Oral daily  apixaban 5 milliGRAM(s) Enteral Tube two times a day  aspirin  chewable 81 milliGRAM(s) Oral daily  atorvastatin 20 milliGRAM(s) Oral at bedtime  carvedilol 25 milliGRAM(s) Oral every 12 hours  chlorhexidine 2% Cloths 1 Application(s) Topical <User Schedule>  dextrose 10% Bolus 125 milliLiter(s) IV Bolus once  dextrose 5%. 1000 milliLiter(s) (50 mL/Hr) IV Continuous <Continuous>  dextrose 5%. 1000 milliLiter(s) (100 mL/Hr) IV Continuous <Continuous>  dextrose 50% Injectable 12.5 Gram(s) IV Push once  dextrose 50% Injectable 25 Gram(s) IV Push once  epoetin reilly-epbx (RETACRIT) Injectable 62388 Unit(s) IV Push <User Schedule>  ferrous    sulfate Liquid 300 milliGRAM(s) Enteral Tube daily  glucagon  Injectable 1 milliGRAM(s) IntraMuscular once  hydrALAZINE 100 milliGRAM(s) Oral three times a day  insulin lispro (ADMELOG) corrective regimen sliding scale   SubCutaneous every 6 hours  insulin NPH human recombinant 4 Unit(s) SubCutaneous every 6 hours  lidocaine   4% Patch 1 Patch Transdermal every 24 hours  lidocaine   4% Patch 1 Patch Transdermal every 24 hours  methyl salicylate 15%/menthol 10% Topical Cream 1 Application(s) Topical <User Schedule>  Nephro-noam 1 Tablet(s) Oral daily  pantoprazole   Suspension 40 milliGRAM(s) Enteral Tube two times a day  polyethylene glycol 3350 17 Gram(s) Oral daily  senna 2 Tablet(s) Oral at bedtime    MEDICATIONS  (PRN):  acetaminophen   Oral Liquid .. 650 milliGRAM(s) Oral every 6 hours PRN Moderate Pain (4 - 6)  bisacodyl Suppository 10 milliGRAM(s) Rectal daily PRN Constipation  dextrose Oral Gel 15 Gram(s) Oral once PRN Blood Glucose LESS THAN 70 milliGRAM(s)/deciliter  melatonin 3 milliGRAM(s) Oral at bedtime PRN Insomnia                            9.6    7.60  )-----------( 333      ( 17 Jun 2024 15:15 )             28.6     06-17    124<L>  |  89<L>  |  95<H>  ----------------------------<  180<H>  4.8   |  28  |  5.97<H>    Ca    8.4      17 Jun 2024 15:15  Phos  4.9     06-17    TPro  6.9  /  Alb  2.6<L>  /  TBili  0.4  /  DBili  x   /  AST  24  /  ALT  22  /  AlkPhos  275<H>  06-17    Urinalysis Basic - ( 17 Jun 2024 15:15 )    Color: x / Appearance: x / SG: x / pH: x  Gluc: 180 mg/dL / Ketone: x  / Bili: x / Urobili: x   Blood: x / Protein: x / Nitrite: x   Leuk Esterase: x / RBC: x / WBC x   Sq Epi: x / Non Sq Epi: x / Bacteria: x        CAPILLARY BLOOD GLUCOSE      POCT Blood Glucose.: 166 mg/dL (17 Jun 2024 12:01)  POCT Blood Glucose.: 166 mg/dL (17 Jun 2024 06:42)  POCT Blood Glucose.: 204 mg/dL (16 Jun 2024 23:27)      Vital Signs Last 24 Hrs  T(C): 36.4 (17 Jun 2024 15:15), Max: 36.6 (16 Jun 2024 20:00)  T(F): 97.6 (17 Jun 2024 15:15), Max: 97.9 (17 Jun 2024 08:19)  HR: 56 (17 Jun 2024 15:15) (55 - 58)  BP: 145/62 (17 Jun 2024 15:15) (145/62 - 169/66)  BP(mean): --  RR: 16 (17 Jun 2024 15:15) (16 - 18)  SpO2: 100% (17 Jun 2024 15:15) (96% - 100%)    Parameters below as of 17 Jun 2024 15:15  Patient On (Oxygen Delivery Method): tracheostomy collar    Constitutional - NAD, Comfortable, cachectic  HEENT - NCAT,   Neck - Supple, No limited ROM  Chest - good chest expansion, good respiratory effort. + trach  Cardio - warm and well perfused,   Abdomen -  Soft, NTND. + peg.   Extremities - No peripheral edema, No calf tenderness. RUE fistula.   Neurologic Exam:                    Cognitive -             Orientation: AO to himself, year, month and day. Not to location.             Attention: Recall 0/3 objects with cuing            Memory: Recent/ remote memory intact            Thought: process, content appropriate     Speech - +hypophonia. Fluent, Comprehensible, No dysarthria, No aphasia      Cranial Nerves - No facial asymmetry, Tongue midline, Shoulder shrug intact     Motor -                      LEFT    UE - ShAB 4/5, EF 4/5, EE 3/5, WE 4/5,  WNL                    RIGHT UE - ShAB 4/5, EF 4/5, EE 3/5, WE 4/5,  WNL                    LEFT    LE - HF 3/5, KE 4/5, DF 4/5, PF 4/5 pain both knees limiting exam                    RIGHT LE - HF 3/5, KE 4/5, DF 4/5, PF 4/5        Sensory - Intact to LT bilateral in face, arm and legs.      Reflexes - DTR 1 / 4 biceps symmetric. neg Latham's b/l, No clonus.  Psychiatric - Mood stable, Affect WNL  Skin on admission: no sacral pressure injuries.

## 2024-06-17 NOTE — PROGRESS NOTE ADULT - ASSESSMENT
73M HTN, ESRD (HD MWF via AVG) DM  Admit April29 with hiccups, Unstable angina, Developed AMS  Required intubation, ICU stay with CRRT, Acute ischemic CVA, DVT, GIB, Sepsis, trach and PEG placement, AVF dysfunction requiring balloon angioplasty   Tx to acute rehab 6/14/24      Knee pain  Lidocaine patch for now    Ischemic CVA and toxic encephalopathy  -Concerns for baclofen toxicity thus held  -ASA/Statin  - NPO/Tube Feeds as below    Acute Hypoxemic, Hypercapnic Respiratory Failure, s/p ETT intubation  - s/p tracheostomy   - Albuterol neb q6h   - Continue TC w/ ATC  - Pulm consult    ESRD on HD MWF via RT AFF  SP AVF dysfunction requiring balloon angioplasty  Functioning fine RN  Nephro following    HTN  - Coreg 25 q12h  - Hydralazine 100 mg TID  - Norvasc 10 mg/D  - BP control per nephro    DM  - A1c 6.9  - NPH 4u q6h  - FS, ISS TIDAC    R Common Iliac DVT  - CT AP (5/12) with nonocclusive RIGHT common iliac vein deep venous thrombosis  - Initially treated with heparin but then developed questionable GIB, transitioned to Eliquis   - IR was consulted, no plan for IVC filter  - Continue Eliquis 5 mg BID       #Oropharyngeal Dysphagia  - s/p PEG (5/17)  - Failed green dye 6/1   - Continue feeds: 10 cc/hr incr. q6 with goal 45cc/hr    73M HTN, ESRD (HD MWF via AVG) DM  Admit April29 with hiccups, Unstable angina, Developed AMS  Required intubation, ICU stay with CRRT, Acute ischemic CVA, DVT, GIB, Sepsis, trach and PEG placement, AVF dysfunction requiring balloon angioplasty   Tx to acute rehab 6/14/24      Knee pain  Lidocaine patch for now    Ischemic CVA and toxic encephalopathy  -Concerns for baclofen toxicity thus held  -ASA/Statin  - NPO/Tube Feeds as below    Acute Hypoxemic, Hypercapnic Respiratory Failure, s/p ETT intubation  - s/p tracheostomy   - Albuterol neb q6h   - Continue TC w/ ATC  - Pulm consult    ESRD on HD MWF via RT AFF  SP AVF dysfunction requiring balloon angioplasty  Functioning fine RN  Nephro following    HTN  - Coreg 25 q12h  - Hydralazine 100 mg TID  - Norvasc 10 mg/D  - Coreg parameters liberalized June16th  - rest of BP control per nephro    DM  - A1c 6.9  - NPH 4u q6h  - FS, ISS TIDAC    R Common Iliac DVT  - CT AP (5/12) with nonocclusive RIGHT common iliac vein deep venous thrombosis  - Initially treated with heparin but then developed questionable GIB, transitioned to Eliquis   - IR was consulted, no plan for IVC filter  - Continue Eliquis 5 mg BID       #Oropharyngeal Dysphagia  - s/p PEG (5/17)  - Failed green dye 6/1   - Continue feeds: 10 cc/hr incr. q6 with goal 45cc/hr

## 2024-06-17 NOTE — PROGRESS NOTE ADULT - ASSESSMENT
73-year-old male w/ past medical history hypertension, ESRD (HD MWF)  insulin-dependent DM presented 4/29/24 to [facility] 4/29 with hiccups and demand ischemia, was treated with baclofen, developed what appears to be toxic encephalopathy. He then sustained a prolonged, complex hospital course over approx 6 weeks, notable for acute respiratory failure, intubation, sepsis, CRRT, DVT, GIB, trach and PEG placement, He is admitted for acute multidisciplinary rehab on 6/14/24 to [facility].     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  #Hospital Acquired Debility   #Dysphagia  #Gait, ADL, Functional impairments  - Comprehensive Multidisciplinary Rehab, PT/OT/ SLP 3 hr/day 5d/wk  - AC: Eliquis 5 BID for DVT, seen on CTA/P 5/12  - ASA81 qD  - Pain: PRN Tylenol, Lidocaine patch   - Absolute Avoidance of Baclofen   - NPO/Tube Feeds as below    #Acute Hypoxemic, Hypercapnic Respiratory Failure, s/p ETT intubation  #s/p tracheostomy   - Albuterol neb q6h   - Continue TC w/ ATC  - Reportedly tolerating PMV during daytime  - Respiratory to follow in house  Pulmonary consulted- Xray- ? downsize trach- secretions Upper airway  PMV during the day    #ESRD on HD  #Fistula stenosis   #Anemia   - Continue HD M/W/F per renal   - Access is RUE AVF  - Regular monitoring of electrolytes  - EPOGEN w/ HD  - Iron supplementation, 300 mg daily     #HTN  - Coreg 25 q12h  - Hydralazine 100 mg TID  - Norvasc 10 mg/D  - Isordil would be next agent added    #NSTEMI, Demand Ischemia, resolved  - Initially, troponins mildly elevated  - TTE (4/30): EF 72, normal, no regional wall motion abnormalities   - No need to pursue cath at this time   - 20 mg lipitor at bedtime     #IDDM2, A1c 6.9  - NPH 4u q6h  - FS, ISS TIDAC    #R Common Iliac DVT  - Initially treated with heparin but then developed questionable GIB, transitioned to Eliquis   - IR was consulted, no plan for IVC filter  - Continue Eliquis 5 mg BID     #GI  - Concern melena 5/27, resolved, transfused   - GI consulted, not candidate for endoscopy due to surgical risk  - Concern for upper GI Bleed, GI felt not true bleed  - Continue PPI BID   - MIralax 17g daily  - Senna 2 @ bedtime   - Ducolax suppository daily PRN     #Oropharyngeal Dysphagia  #s/p PEG (5/17)  - Failed green dye 6/1   - Continue feeds: Card Steady 10 cc/hr incr. q6 goal 45cc/hr     #Mood / Cognition:  - Neuropsych evaluation given prolonged and complex hospitalization  - Chart mentions concern for depression w/ possible suicidal ideation     #/Bladder:  - Check urinary status on arrival, possibly anuric     #Dysphagia:    - Diet: NPO/Tube Feeds  - Ongoing SLP assessment  - Nutrition to follow    #Skin/ Pressure Injury Prevention:  - Assessment on admission   - Incisions:    #Precautions/ Restrictions  - Falls, Aspiration Precautions   - COVID PCR:

## 2024-06-17 NOTE — PROGRESS NOTE ADULT - SUBJECTIVE AND OBJECTIVE BOX
No distress    Vital Signs Last 24 Hrs  T(C): 36.3 (06-17-24 @ 18:15), Max: 36.6 (06-17-24 @ 08:19)  T(F): 97.3 (06-17-24 @ 18:15), Max: 97.9 (06-17-24 @ 08:19)  HR: 78 (06-17-24 @ 18:49) (55 - 78)  BP: 156/62 (06-17-24 @ 18:49) (120/64 - 164/66)  RR: 16 (06-17-24 @ 18:49) (16 - 16)  SpO2: 98% (06-17-24 @ 21:42) (98% - 100%)    I&O's Detail    16 Jun 2024 07:01  -  17 Jun 2024 07:00  --------------------------------------------------------  IN:    Nepro: 750 mL  Total IN: 750 mL    OUT:  Total OUT: 0 mL    17 Jun 2024 07:01  -  17 Jun 2024 22:24  --------------------------------------------------------  OUT:    Other (mL): 1500 mL  Total OUT: 1500 mL    Lungs b/l air entry  Heart S1S2  Abd soft ND  Extremities: tr edema  RUE avf                         9.6    7.60  )-----------( 333      ( 17 Jun 2024 15:15 )             28.6     17 Jun 2024 15:15    124    |  89     |  95     ----------------------------<  180    4.8     |  28     |  5.97     Ca    8.4        17 Jun 2024 15:15  Phos  4.9       17 Jun 2024 15:15    TPro  6.9    /  Alb  2.6    /  TBili  0.4    /  DBili  x      /  AST  24     /  ALT  22     /  AlkPhos  275    17 Jun 2024 15:15    LIVER FUNCTIONS - ( 17 Jun 2024 15:15 )  Alb: 2.6 g/dL / Pro: 6.9 g/dL / ALK PHOS: 275 U/L / ALT: 22 U/L / AST: 24 U/L / GGT: x           acetaminophen   Oral Liquid .. 650 milliGRAM(s) Oral every 6 hours PRN  albuterol    0.083% 2.5 milliGRAM(s) Nebulizer every 6 hours  amLODIPine   Tablet 10 milliGRAM(s) Oral daily  apixaban 5 milliGRAM(s) Enteral Tube two times a day  aspirin  chewable 81 milliGRAM(s) Oral daily  atorvastatin 20 milliGRAM(s) Oral at bedtime  bisacodyl Suppository 10 milliGRAM(s) Rectal daily PRN  carvedilol 25 milliGRAM(s) Oral every 12 hours  chlorhexidine 2% Cloths 1 Application(s) Topical <User Schedule>  dextrose 10% Bolus 125 milliLiter(s) IV Bolus once  dextrose 5%. 1000 milliLiter(s) IV Continuous <Continuous>  dextrose 5%. 1000 milliLiter(s) IV Continuous <Continuous>  dextrose 50% Injectable 12.5 Gram(s) IV Push once  dextrose 50% Injectable 25 Gram(s) IV Push once  dextrose Oral Gel 15 Gram(s) Oral once PRN  epoetin reilly-epbx (RETACRIT) Injectable 43349 Unit(s) IV Push <User Schedule>  ferrous    sulfate Liquid 300 milliGRAM(s) Enteral Tube daily  glucagon  Injectable 1 milliGRAM(s) IntraMuscular once  hydrALAZINE 100 milliGRAM(s) Oral three times a day  insulin lispro (ADMELOG) corrective regimen sliding scale   SubCutaneous Before meals and at bedtime  insulin NPH human recombinant 4 Unit(s) SubCutaneous every 6 hours  lidocaine   4% Patch 1 Patch Transdermal every 24 hours  lidocaine   4% Patch 1 Patch Transdermal every 24 hours  melatonin 3 milliGRAM(s) Oral at bedtime PRN  methyl salicylate 15%/menthol 10% Topical Cream 1 Application(s) Topical <User Schedule>  Nephro-noam 1 Tablet(s) Oral daily  pantoprazole   Suspension 40 milliGRAM(s) Enteral Tube two times a day  polyethylene glycol 3350 17 Gram(s) Oral daily  senna 2 Tablet(s) Oral at bedtime    A/P:    ESRD on HD  S/p complicated hospital course as per HPI  S/p CVA, trach, PEG  HD MWF  TMP 1.5kg w/HD today  Epoetin w/HD  Rehab    [phone]

## 2024-06-17 NOTE — PROGRESS NOTE ADULT - SUBJECTIVE AND OBJECTIVE BOX
|-------------------------------------------|                       Subjective   |-------------------------------------------|    right and left knee pain at night    |------------------------------------------|                       Objective  |------------------------------------------|    Vital Signs Last 24 Hrs  T(F): 97.9 (17 Jun 2024 08:19), Max: 97.9 (17 Jun 2024 08:19)  HR: 55 (17 Jun 2024 08:19) (55 - 64)  BP: 145/63 (17 Jun 2024 08:19) (145/63 - 192/72)  RR: 16 (17 Jun 2024 08:19) (16 - 18)  SpO2: 98% (17 Jun 2024 09:45) (96% - 98%)  I&O's Summary    16 Jun 2024 07:01  -  17 Jun 2024 07:00  --------------------------------------------------------  IN: 750 mL / OUT: 0 mL / NET: 750 mL        Examination:   General: NAD, Comfortable  Pulm: CTA BL No Rhonchi Rales or wheezes  Neck: Supple, No JVD  CVS: RRR No rubs murmurs or gallops  Abdomen: Soft, Nontender, Nondistended; No masses or organomegally  Extremities: No calf tenderness, No pitting edema, knees w/ full ROM, no effusion, no warmth or injury    Labs:                        9.7    9.36  )-----------( 328      ( 15 Eze 2024 05:14 )             31.0       06-15    133  |  97  |  51  ----------------------------<  154  4.1   |  29  |  3.88    Ca    8.6      15 Eze 2024 05:14    TPro  7.1  /  Alb  2.5  /  TBili  0.4  /  DBili  x   /  AST  20  /  ALT  24  /  AlkPhos  201  06-15              POCT Blood Glucose.: 166 mg/dL (17 Jun 2024 12:01)  POCT Blood Glucose.: 166 mg/dL (17 Jun 2024 06:42)  POCT Blood Glucose.: 204 mg/dL (16 Jun 2024 23:27)  POCT Blood Glucose.: 176 mg/dL (16 Jun 2024 17:56)            POCT Blood Glucose.: 175 mg/dL (16 Jun 2024 06:37)  POCT Blood Glucose.: 163 mg/dL (16 Jun 2024 00:09)  POCT Blood Glucose.: 89 mg/dL (15 Eze 2024 17:10)  POCT Blood Glucose.: 223 mg/dL (15 Eze 2024 11:54)

## 2024-06-17 NOTE — CONSULT NOTE ADULT - ASSESSMENT
Assessment  1. Chronic Respiratory failure s/p Trach  2. AMS appears to have improved , ? Baclofen toxicity vs CVA  3. ESRD on HD, DM 2,   4. Right iliac DVT on Eliquis      Plan  Check CXR  avoid frequent tracheal suctioning ; Oral suction ok  trach care  o2 Viat TC to keep humidity and sat > 92%  PMV trials while monitored with speech  continue Eliquis  will follow

## 2024-06-17 NOTE — CONSULT NOTE ADULT - SUBJECTIVE AND OBJECTIVE BOX
PULMONARY CONSULT  Location of Patient :  [facility] D[facility] ( [facility])  Attending requesting Consult:Chidi Sams  Chief Complaint :  Rehab  Reason For consult : Trach Care      Initial HPI on admission:  HPI:  73-year-old male w/ past medical history hypertension, ESRD (HD MWF)  insulin-dependent DM presented 4/29/24 to [facility] with hiccups, chest pain of several days duration, admitted for concern demand ischemia. Early on, he was givenv baclofen for his hiccups, but soon developed AMS with 2 RRTs by 5/2/24, prompting intubation, upgrade to MICU. There was concern that baclofen toxicity was the culprit of his then-encephalopathic state, as patient had also missed a HD session due to a clotted fistula.     In coming days, he was treated empirically with 5 days of zosyn, had a negative CTH and LP, and had an EEG that did not capture a seizure but showed diffuse non-specific cerebral dysfunction - c/w toxic-metabolic encephalopathies. On 5/6, an MRI showed that the patient had sustained R pontine and cerebellar stroke. He was extubated on 5/9, but owing to a challenging extubation became septic w/ B Cereus, treated with Vanc. He had trach placed 5/14, PEG 5/17, then downgraded.     On the floors, he was followed by several services, including Neurology, who mention the strokes were embolic appearing. Subsequent hospital course notable for nonocclusive R common iliac DVT, for which he was started on a heparin drip which was stopped when patient developed an upper GI Bleed, though GI was consulted and felt he did not have an overt bleed. His dialysis fistula site was stenotic, s/p fistulogram, ultimately changed to a RUE AVF 6/5. He was ultimately started on Eliquis 6/7 for the DVT. Medically stable, he is admitted to [facility] on 6/14/24.  (14 Jun 2024 14:22)      BRIEF HOSPITAL COURSE:   Pt seen and examined today   as per bedside team  patient has had mild blood tinged sputum when suctioned (none when not) from trach  + oral secretions  on PMV able to bring up secretions.     patient able to phonate with manual capping of trach  no secretions noted when evaluated      PAST MEDICAL & SURGICAL HISTORY:  Diabetes  Benign essential HTN  HLD (hyperlipidemia)  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  ESRD on hemodialysis  Arteriovenous fistula    Allergies    No Known Allergies    Intolerances      FAMILY HISTORY:  FHx: diabetes mellitus (Father, Aunt)      Social history: Social History:  Smoke denies  drinking denies  rec drugs denies    Pt lives with family in a house, 4 steps to enter and a flight inside. Prior to admission pt was fully independent in ADLs and ambulation without device. Patient performed active assistive ROM of both UE / LE in functional plains with 10 repetition    mobility- max-A.   UBD mod-A.   grooming min-A.      lives in private house with wife. 4 NIK. 2 floors. Independent PTA. (14 Jun 2024 14:22)  Denies h/o smoking    Due to current medical status patient unable to provide medical, social, family history.  History obtained from available medical record.  limited due to trach       Medications:  MEDICATIONS  (STANDING):  albuterol    0.083% 2.5 milliGRAM(s) Nebulizer every 6 hours  amLODIPine   Tablet 10 milliGRAM(s) Oral daily  apixaban 5 milliGRAM(s) Enteral Tube two times a day  aspirin  chewable 81 milliGRAM(s) Oral daily  atorvastatin 20 milliGRAM(s) Oral at bedtime  carvedilol 25 milliGRAM(s) Oral every 12 hours  chlorhexidine 2% Cloths 1 Application(s) Topical <User Schedule>  dextrose 10% Bolus 125 milliLiter(s) IV Bolus once  dextrose 5%. 1000 milliLiter(s) (50 mL/Hr) IV Continuous <Continuous>  dextrose 5%. 1000 milliLiter(s) (100 mL/Hr) IV Continuous <Continuous>  dextrose 50% Injectable 12.5 Gram(s) IV Push once  dextrose 50% Injectable 25 Gram(s) IV Push once  epoetin reilly-epbx (RETACRIT) Injectable 20021 Unit(s) IV Push <User Schedule>  ferrous    sulfate Liquid 300 milliGRAM(s) Enteral Tube daily  glucagon  Injectable 1 milliGRAM(s) IntraMuscular once  hydrALAZINE 100 milliGRAM(s) Oral three times a day  insulin lispro (ADMELOG) corrective regimen sliding scale   SubCutaneous every 6 hours  insulin NPH human recombinant 4 Unit(s) SubCutaneous every 6 hours  lidocaine   4% Patch 1 Patch Transdermal every 24 hours  lidocaine   4% Patch 1 Patch Transdermal every 24 hours  methyl salicylate 15%/menthol 10% Topical Cream 1 Application(s) Topical <User Schedule>  Nephro-noam 1 Tablet(s) Oral daily  pantoprazole   Suspension 40 milliGRAM(s) Enteral Tube two times a day  polyethylene glycol 3350 17 Gram(s) Oral daily  senna 2 Tablet(s) Oral at bedtime    MEDICATIONS  (PRN):  acetaminophen   Oral Liquid .. 650 milliGRAM(s) Oral every 6 hours PRN Moderate Pain (4 - 6)  bisacodyl Suppository 10 milliGRAM(s) Rectal daily PRN Constipation  dextrose Oral Gel 15 Gram(s) Oral once PRN Blood Glucose LESS THAN 70 milliGRAM(s)/deciliter  melatonin 3 milliGRAM(s) Oral at bedtime PRN Insomnia      Antibiotics History      Heme Medications   apixaban 5 milliGRAM(s) Enteral Tube two times a day, 06-14-24 @ 18:29  aspirin  chewable 81 milliGRAM(s) Oral daily, 06-15-24 @ 00:00      GI Medications  bisacodyl Suppository 10 milliGRAM(s) Rectal daily, 06-14-24 @ 18:30, Routine PRN  pantoprazole   Suspension 40 milliGRAM(s) Enteral Tube two times a day, 06-14-24 @ 21:14, Routine  polyethylene glycol 3350 17 Gram(s) Oral daily, 06-15-24 @ 00:00, Routine  senna 2 Tablet(s) Oral at bedtime, 06-14-24 @ 18:29, Routine        Home Medications:  Last Order Reconciliation Date: Not Done  acetaminophen 160 mg/5 mL oral suspension: 20.31 milliliter(s) orally every 6 hours as needed for Temp greater or equal to 38C (100.4F), Moderate Pain (4 - 6) (06-14-24 @ 11:53)  albuterol 2.5 mg/3 mL (0.083%) inhalation solution: 3 milliliter(s) inhaled every 6 hours (06-14-24 @ 11:53)  amLODIPine 10 mg oral tablet: 1 tab(s) orally once a day (06-14-24 @ 11:53)  apixaban 5 mg oral tablet: 1 tab(s) orally every 12 hours (06-14-24 @ 11:53)  aspirin 81 mg oral tablet, chewable: 1 tab(s) orally once a day (06-14-24 @ 11:53)  atorvastatin 20 mg oral tablet: 1 tab(s) orally once a day (04-29-24 @ 21:17)  carvedilol 25 mg oral tablet: 1 tab(s) orally every 12 hours (06-14-24 @ 11:53)  epoetin reilly: 10,000 unit(s) intravenous 3 times a week 10,000u IVP M-W-F with HD (06-14-24 @ 12:25)  ferrous sulfate 300 mg/5 mL (60 mg/5 mL elemental iron) oral liquid: 5 milliliter(s) orally once a day (06-14-24 @ 11:53)  hydrALAZINE 100 mg oral tablet: 1 tab(s) orally 3 times a day (06-14-24 @ 12:01)  insulin isophane (NPH) 100 units/mL human recombinant subcutaneous suspension: 4 unit(s) subcutaneous every 6 hours (06-14-24 @ 12:25)  insulin lispro 100 units/mL injectable solution: injectable 4 times a day Accuchecks every 6 h  2 Unit(s) if Glucose 151 - 200  4 Unit(s) if Glucose 201 - 250  6 Unit(s) if Glucose 251 - 300  8 Unit(s) if Glucose 301 - 350  10 Unit(s) if Glucose 351 - 400  12 Unit(s) if Glucose Greater Than 400 (06-14-24 @ 12:25)  lidocaine 4% topical film: Apply topically to affected area once a day (06-14-24 @ 11:53)  polyethylene glycol 3350 oral powder for reconstitution: 17 gram(s) orally once a day (06-14-24 @ 11:53)  senna leaf extract oral tablet: 2 tab(s) orally once a day (at bedtime) (06-14-24 @ 11:53)      LABS:                              CULTURES: (if applicable)    Culture - Sputum (collected 06-06-24 @ 11:27)  Source: .Sputum Sputum  Gram Stain (06-06-24 @ 18:13):    Few Squamous epithelial cells per low power field    Few polymorphonuclear leukocytes per low power field    Moderate Gram Negative Rods per oil power field    Few Gram Negative Diplococci per oil power field  Final Report (06-08-24 @ 14:37):    Normal Respiratory Jocelyn present            CAPILLARY BLOOD GLUCOSE      POCT Blood Glucose.: 166 mg/dL (17 Jun 2024 12:01)      RADIOLOGY  CXR:    < from: Xray Chest 1 View- PORTABLE-Routine (Xray Chest 1 View- PORTABLE-Routine .) (05.25.24 @ 19:54) >    ACC: [id number] EXAM:  XR CHEST PORTABLE ROUTINE 1V   ORDERED BY: GIO VERGARA     PROCEDURE DATE:  05/25/2024          INTERPRETATION:  CLINICAL INFORMATION: Fever    TIME OF EXAMINATION: May 25 at 7:44 PM    EXAM: Portable chest    FINDINGS:    Resolving right basilar opacity but new haziness at the left lung base   obscuring the hemidiaphragm may represent layering effusion with   atelectasis.  Both upper lobes are clear.  Tracheostomy tube and right IJ catheter are unchanged.  No pneumothorax.        COMPARISON: May 21        IMPRESSION: Follow-up with new haziness in the lower left hemithorax most   consistent with layering effusion and atelectasis.    --- End of Report ---    < end of copied text >       VITALS:  T(C): 36.6 (06-17-24 @ 08:19), Max: 36.6 (06-16-24 @ 20:00)  T(F): 97.9 (06-17-24 @ 08:19), Max: 97.9 (06-17-24 @ 08:19)  HR: 55 (06-17-24 @ 08:19) (55 - 64)  BP: 145/63 (06-17-24 @ 08:19) (145/63 - 192/72)  BP(mean): --  ABP: --  ABP(mean): --  RR: 16 (06-17-24 @ 08:19) (16 - 18)  SpO2: 98% (06-17-24 @ 09:45) (96% - 98%)  CVP(mm Hg): --  CVP(cm H2O): --    Ins and Outs     06-16-24 @ 07:01  -  06-17-24 @ 07:00  --------------------------------------------------------  IN: 750 mL / OUT: 0 mL / NET: 750 mL                I&O's Detail    16 Jun 2024 07:01  -  17 Jun 2024 07:00  --------------------------------------------------------  IN:    Nepro: 750 mL  Total IN: 750 mL    OUT:  Total OUT: 0 mL    Total NET: 750 mL     Physical Examination:  GENERAL:               Alert,  No acute distress.    HEENT:                   No JVD, Dry MM + trach with no secretion   PULM:                     Bilateral air entry, diminished to auscultation bilaterally, no significant sputum production, No Rales, No Rhonchi, No Wheezing  CVS:                         S1, S2,  No Murmur  ABD:                        Soft, nondistended, nontender, normoactive bowel sounds, + PEG  EXT:                         No edema, nontender, No Cyanosis or Clubbing    NEURO:                  Alert,  interactive,  follows commands  PSYC:                      Calm,

## 2024-06-18 LAB
GLUCOSE BLDC GLUCOMTR-MCNC: 109 MG/DL — HIGH (ref 70–99)
GLUCOSE BLDC GLUCOMTR-MCNC: 116 MG/DL — HIGH (ref 70–99)
GLUCOSE BLDC GLUCOMTR-MCNC: 151 MG/DL — HIGH (ref 70–99)
GLUCOSE BLDC GLUCOMTR-MCNC: 206 MG/DL — HIGH (ref 70–99)

## 2024-06-18 PROCEDURE — 99232 SBSQ HOSP IP/OBS MODERATE 35: CPT | Mod: GC

## 2024-06-18 PROCEDURE — 71045 X-RAY EXAM CHEST 1 VIEW: CPT | Mod: 26

## 2024-06-18 PROCEDURE — 99232 SBSQ HOSP IP/OBS MODERATE 35: CPT

## 2024-06-18 RX ADMIN — CARVEDILOL PHOSPHATE 25 MILLIGRAM(S): 80 CAPSULE, EXTENDED RELEASE ORAL at 06:47

## 2024-06-18 RX ADMIN — AMLODIPINE BESYLATE 10 MILLIGRAM(S): 2.5 TABLET ORAL at 06:47

## 2024-06-18 RX ADMIN — Medication 2.5 MILLIGRAM(S): at 21:08

## 2024-06-18 RX ADMIN — Medication 2.5 MILLIGRAM(S): at 08:09

## 2024-06-18 RX ADMIN — APIXABAN 5 MILLIGRAM(S): 5 TABLET, FILM COATED ORAL at 17:13

## 2024-06-18 RX ADMIN — Medication 2.5 MILLIGRAM(S): at 15:00

## 2024-06-18 RX ADMIN — ASPIRIN 81 MILLIGRAM(S): 325 TABLET, FILM COATED ORAL at 12:19

## 2024-06-18 RX ADMIN — PANTOPRAZOLE SODIUM 40 MILLIGRAM(S): 40 INJECTION, POWDER, FOR SOLUTION INTRAVENOUS at 17:13

## 2024-06-18 RX ADMIN — INSULIN LISPRO 2: 100 INJECTION, SOLUTION SUBCUTANEOUS at 11:47

## 2024-06-18 RX ADMIN — INSULIN LISPRO 1: 100 INJECTION, SOLUTION SUBCUTANEOUS at 07:36

## 2024-06-18 RX ADMIN — MENTHOL, METHYL SALICYLATE 1 APPLICATION(S): 63; 210 PATCH PERCUTANEOUS; TOPICAL; TRANSDERMAL at 07:00

## 2024-06-18 RX ADMIN — LIDOCAINE HCL 1 PATCH: 28 GEL TOPICAL at 17:13

## 2024-06-18 RX ADMIN — Medication 4 UNIT(S): at 08:13

## 2024-06-18 RX ADMIN — LIDOCAINE HCL 1 PATCH: 28 GEL TOPICAL at 07:00

## 2024-06-18 RX ADMIN — APIXABAN 5 MILLIGRAM(S): 5 TABLET, FILM COATED ORAL at 06:49

## 2024-06-18 RX ADMIN — Medication 1 TABLET(S): at 12:19

## 2024-06-18 RX ADMIN — LIDOCAINE HCL 1 PATCH: 28 GEL TOPICAL at 21:38

## 2024-06-18 RX ADMIN — Medication 300 MILLIGRAM(S): at 12:19

## 2024-06-18 RX ADMIN — HYDRALAZINE HYDROCHLORIDE 100 MILLIGRAM(S): 50 TABLET ORAL at 06:47

## 2024-06-18 RX ADMIN — HYDRALAZINE HYDROCHLORIDE 100 MILLIGRAM(S): 50 TABLET ORAL at 21:44

## 2024-06-18 RX ADMIN — PANTOPRAZOLE SODIUM 40 MILLIGRAM(S): 40 INJECTION, POWDER, FOR SOLUTION INTRAVENOUS at 06:47

## 2024-06-18 RX ADMIN — ATORVASTATIN CALCIUM 20 MILLIGRAM(S): 20 TABLET, FILM COATED ORAL at 21:45

## 2024-06-18 RX ADMIN — HYDRALAZINE HYDROCHLORIDE 100 MILLIGRAM(S): 50 TABLET ORAL at 13:36

## 2024-06-18 RX ADMIN — CARVEDILOL PHOSPHATE 25 MILLIGRAM(S): 80 CAPSULE, EXTENDED RELEASE ORAL at 21:45

## 2024-06-18 RX ADMIN — Medication 4 UNIT(S): at 11:50

## 2024-06-18 RX ADMIN — Medication 1 APPLICATION(S): at 06:49

## 2024-06-18 RX ADMIN — MENTHOL, METHYL SALICYLATE 1 APPLICATION(S): 63; 210 PATCH PERCUTANEOUS; TOPICAL; TRANSDERMAL at 13:16

## 2024-06-18 NOTE — PROGRESS NOTE ADULT - SUBJECTIVE AND OBJECTIVE BOX
Resting     Vital Signs Last 24 Hrs  T(C): 36.7 (06-18-24 @ 07:37), Max: 36.7 (06-18-24 @ 07:37)  T(F): 98.1 (06-18-24 @ 07:37), Max: 98.1 (06-18-24 @ 07:37)  HR: 61 (06-18-24 @ 15:00) (58 - 76)  BP: 150/65 (06-18-24 @ 13:40) (137/62 - 150/65)  RR: 17 (06-18-24 @ 07:37) (17 - 18)  SpO2: 99% (06-18-24 @ 15:00) (98% - 100%)    I&O's Detail    17 Jun 2024 07:01  -  18 Jun 2024 07:00  --------------------------------------------------------  IN:    Nepro: 250 mL  Total IN: 250 mL    OUT:    Other (mL): 1500 mL  Total OUT: 1500 mL    18 Jun 2024 07:01  -  18 Jun 2024 19:28  --------------------------------------------------------  IN:    IV PiggyBack: 300 mL    Nepro: 474 mL  Total IN: 774 mL    OUT:  Total OUT: 0 mL    Lungs b/l air entry  Heart S1S2  Abd soft ND  Extremities: tr edema  RUE avf                              9.6    7.60  )-----------( 333      ( 17 Jun 2024 15:15 )             28.6     17 Jun 2024 15:15    124    |  89     |  95     ----------------------------<  180    4.8     |  28     |  5.97     Ca    8.4        17 Jun 2024 15:15  Phos  4.9       17 Jun 2024 15:15    TPro  6.9    /  Alb  2.6    /  TBili  0.4    /  DBili  x      /  AST  24     /  ALT  22     /  AlkPhos  275    17 Jun 2024 15:15    LIVER FUNCTIONS - ( 17 Jun 2024 15:15 )  Alb: 2.6 g/dL / Pro: 6.9 g/dL / ALK PHOS: 275 U/L / ALT: 22 U/L / AST: 24 U/L / GGT: x           acetaminophen   Oral Liquid .. 650 milliGRAM(s) Oral every 6 hours PRN  albuterol    0.083% 2.5 milliGRAM(s) Nebulizer every 6 hours  amLODIPine   Tablet 10 milliGRAM(s) Oral daily  apixaban 5 milliGRAM(s) Enteral Tube two times a day  aspirin  chewable 81 milliGRAM(s) Oral daily  atorvastatin 20 milliGRAM(s) Oral at bedtime  bisacodyl Suppository 10 milliGRAM(s) Rectal daily PRN  carvedilol 25 milliGRAM(s) Oral every 12 hours  chlorhexidine 2% Cloths 1 Application(s) Topical <User Schedule>  dextrose 10% Bolus 125 milliLiter(s) IV Bolus once  dextrose 5%. 1000 milliLiter(s) IV Continuous <Continuous>  dextrose 5%. 1000 milliLiter(s) IV Continuous <Continuous>  dextrose 50% Injectable 12.5 Gram(s) IV Push once  dextrose 50% Injectable 25 Gram(s) IV Push once  dextrose Oral Gel 15 Gram(s) Oral once PRN  epoetin reilly-epbx (RETACRIT) Injectable 38362 Unit(s) IV Push <User Schedule>  ferrous    sulfate Liquid 300 milliGRAM(s) Enteral Tube daily  glucagon  Injectable 1 milliGRAM(s) IntraMuscular once  hydrALAZINE 100 milliGRAM(s) Oral three times a day  insulin lispro (ADMELOG) corrective regimen sliding scale   SubCutaneous Before meals and at bedtime  insulin NPH human recombinant 4 Unit(s) SubCutaneous every 6 hours  lidocaine   4% Patch 1 Patch Transdermal every 24 hours  lidocaine   4% Patch 1 Patch Transdermal every 24 hours  melatonin 3 milliGRAM(s) Oral at bedtime PRN  methyl salicylate 15%/menthol 10% Topical Cream 1 Application(s) Topical <User Schedule>  Nephro-noam 1 Tablet(s) Oral daily  pantoprazole   Suspension 40 milliGRAM(s) Enteral Tube two times a day  polyethylene glycol 3350 17 Gram(s) Oral daily  senna 2 Tablet(s) Oral at bedtime    A/P:    ESRD on HD  S/p complicated hospital course as per HPI  S/p CVA, trach, PEG  HD MWF  TMP 1-2kg w/HD as able  Epoetin w/HD  Hyponatremia  Will reduce free water via PEG  Bld work w/each HD     [phone]

## 2024-06-18 NOTE — PROGRESS NOTE ADULT - ASSESSMENT
73M HTN, ESRD (HD MWF via AVG) DM  Admit April29 with hiccups, Unstable angina, Developed AMS  Required intubation, ICU stay with CRRT, Acute ischemic CVA, DVT, GIB, Sepsis, trach and PEG placement, AVF dysfunction requiring balloon angioplasty   Tx to acute rehab 6/14/24    Knee pain better with Lidocaine patch    Ischemic CVA and toxic encephalopathy  -Concerns for baclofen toxicity thus held  -ASA/Statin  - NPO/Tube Feeds as below    Acute Hypoxemic, Hypercapnic Respiratory Failure, s/p ETT intubation  - s/p tracheostomy   - Albuterol neb q6h   - Continue TC w/ ATC  - Pulm consult    ESRD on HD MWF via RT AFF  SP AVF dysfunction requiring balloon angioplasty  Functioning fine RN  Nephro following    HTN  - Coreg 25 q12h  - Hydralazine 100 mg TID  - Norvasc 10 mg/D  - Coreg parameters liberalized June16th  - rest of BP control per nephro    DM  - A1c 6.9  - NPH 4u q6h  - FS, ISS TIDAC    R Common Iliac DVT  - CT AP (5/12) with nonocclusive RIGHT common iliac vein deep venous thrombosis  - Initially treated with heparin but then developed questionable GIB, transitioned to Eliquis   - IR was consulted, no plan for IVC filter  - Continue Eliquis 5 mg BID     #Oropharyngeal Dysphagia  - s/p PEG (5/17)  - Failed green dye 6/1   - Continue feeds: 10 cc/hr incr. q6 with goal 45cc/hr    73M HTN, ESRD (HD MWF via AVG) DM  Admit April29 with hiccups, Unstable angina, Developed AMS  Required intubation, ICU stay with CRRT, Acute ischemic CVA, DVT, GIB, Sepsis, trach and PEG placement, AVF dysfunction requiring balloon angioplasty   Tx to acute rehab 6/14/24    Hyponatremia, acute, electrolyte abnormality  patient going for hemodialysis, recommend chemistry, clinically stable at moment    Knee pain better with Lidocaine patch    Ischemic CVA and toxic encephalopathy  -Concerns for baclofen toxicity thus held  -ASA/Statin  - NPO/Tube Feeds as below    Acute Hypoxemic, Hypercapnic Respiratory Failure, s/p ETT intubation  - s/p tracheostomy   - Albuterol neb q6h   - Continue TC w/ ATC  - Pulm consult    ESRD on HD MWF via RT AFF  SP AVF dysfunction requiring balloon angioplasty  Functioning fine RN  Nephro following    HTN  - Coreg 25 q12h  - Hydralazine 100 mg TID  - Norvasc 10 mg/D  - Coreg parameters liberalized June16th  - rest of BP control per nephro    DM  - A1c 6.9  - NPH 4u q6h  - FS, ISS TIDAC    R Common Iliac DVT  - CT AP (5/12) with nonocclusive RIGHT common iliac vein deep venous thrombosis  - Initially treated with heparin but then developed questionable GIB, transitioned to Eliquis   - IR was consulted, no plan for IVC filter  - Continue Eliquis 5 mg BID     #Oropharyngeal Dysphagia  - s/p PEG (5/17)  - Failed green dye 6/1   - Continue feeds: 10 cc/hr incr. q6 with goal 45cc/hr

## 2024-06-18 NOTE — PROGRESS NOTE ADULT - ASSESSMENT
Physical Examination:  GENERAL:               Alert,  No acute distress.    HEENT:                   No JVD, Dry MM + trach with no secretion   PULM:                     Bilateral air entry, diminished to auscultation bilaterally, no significant sputum production, No Rales, No Rhonchi, No Wheezing  CVS:                         S1, S2,  No Murmur  ABD:                        Soft, nondistended, nontender, normoactive bowel sounds, + PEG  EXT:                         No edema, nontender, No Cyanosis or Clubbing    NEURO:                  Alert,  interactive,  follows commands  PSYC:                      Calm,      Assessment  1. Chronic Respiratory failure s/p Trach  2. AMS appears to have improved , ? Baclofen toxicity vs CVA  3. ESRD on HD, DM 2,   4. Right iliac DVT on Eliquis      Plan  avoid frequent tracheal suctioning ; Oral suction ok  trach care  o2 Viat TC to keep humidity and sat > 92%  PMV trials while monitored with speech  would advance as tolerated to PMV with therapies and capping with speech  continue Eliquis for DVT    will follow

## 2024-06-18 NOTE — CHART NOTE - NSCHARTNOTEFT_GEN_A_CORE
Nutrition Follow Up Note  Hospital Course   (Per Electronic Medical Record)    Source:  Patient [X]  Speech Therapy [X]   Nursing Staff [X]   Medical Record [X]      Diet:   NPO  Aspiration Precautions  Discussed w/ Speech Therapy   Possible MBS Later in Week    Enteral/Parenteral Nutrition:   Nepro  237ml Bolus 3xday - Recently Changed  Manual Flush 200ml x3Day   Tolerates Tubefeeding Well - Per Nursing Staff   No Recent Nausea, Vomiting, Diarrhea or Constipation (as Per Patient)   Nutrition Education Provided on Need for Bolus Feeding    Current Weight: 103.8lb on 6/17  Obtain Weights Daily  Obtain New Weight to Confirm     Pertinent Medications: MEDICATIONS  (STANDING):  albuterol    0.083% 2.5 milliGRAM(s) Nebulizer every 6 hours  amLODIPine   Tablet 10 milliGRAM(s) Oral daily  apixaban 5 milliGRAM(s) Enteral Tube two times a day  aspirin  chewable 81 milliGRAM(s) Oral daily  atorvastatin 20 milliGRAM(s) Oral at bedtime  carvedilol 25 milliGRAM(s) Oral every 12 hours  chlorhexidine 2% Cloths 1 Application(s) Topical <User Schedule>  dextrose 10% Bolus 125 milliLiter(s) IV Bolus once  dextrose 5%. 1000 milliLiter(s) (50 mL/Hr) IV Continuous <Continuous>  dextrose 5%. 1000 milliLiter(s) (100 mL/Hr) IV Continuous <Continuous>  dextrose 50% Injectable 12.5 Gram(s) IV Push once  dextrose 50% Injectable 25 Gram(s) IV Push once  epoetin reilly-epbx (RETACRIT) Injectable 14973 Unit(s) IV Push <User Schedule>  ferrous    sulfate Liquid 300 milliGRAM(s) Enteral Tube daily  glucagon  Injectable 1 milliGRAM(s) IntraMuscular once  hydrALAZINE 100 milliGRAM(s) Oral three times a day  insulin lispro (ADMELOG) corrective regimen sliding scale   SubCutaneous Before meals and at bedtime  insulin NPH human recombinant 4 Unit(s) SubCutaneous every 6 hours  lidocaine   4% Patch 1 Patch Transdermal every 24 hours  lidocaine   4% Patch 1 Patch Transdermal every 24 hours  methyl salicylate 15%/menthol 10% Topical Cream 1 Application(s) Topical <User Schedule>  Nephro-noam 1 Tablet(s) Oral daily  pantoprazole   Suspension 40 milliGRAM(s) Enteral Tube two times a day  polyethylene glycol 3350 17 Gram(s) Oral daily  senna 2 Tablet(s) Oral at bedtime    MEDICATIONS  (PRN):  acetaminophen   Oral Liquid .. 650 milliGRAM(s) Oral every 6 hours PRN Moderate Pain (4 - 6)  bisacodyl Suppository 10 milliGRAM(s) Rectal daily PRN Constipation  dextrose Oral Gel 15 Gram(s) Oral once PRN Blood Glucose LESS THAN 70 milliGRAM(s)/deciliter  melatonin 3 milliGRAM(s) Oral at bedtime PRN Insomnia    Pertinent Labs:  06-17 Na124 mmol/L<L> Glu 180 mg/dL<H> K+ 4.8 mmol/L Cr  5.97 mg/dL<H> BUN 95 mg/dL<H> 06-17 Phos 4.9 mg/dL<H> 06-17 Alb 2.6 g/dL<L>    POCT (over Last 3 Days) - Ranging from     Skin: No Pressure Ulcers     Edema: None Noted (as Per Documentation)     Last Bowel Movement: on 6/18    Estimated Needs:   [X] No Change Since Previous Assessment    Previous Nutrition Diagnosis:   Severe Malnutrition  Swallowing Difficulties     Nutrition Diagnosis is [X] Ongoing - Continues on Tubefeeding      New Nutrition Diagnosis: [X] Not Applicable    Interventions:   1. Nutrition Education Provided on Need for Bolus Feeding  2. Recommend Continue Nutrition Plan of Care     Monitoring & Evaluation:   [X] Weights   [X] Skin Integrity   [X] Follow Up (Per Protocol)  [X] Tolerance to Tubefeeding Prescription   [X] Other: Labs & POCT    Registered Dietitian/Nutritionist Remains Available.  Rubén Borden, MAXIMO, CDN    Phone# [phone]

## 2024-06-18 NOTE — PROGRESS NOTE ADULT - ASSESSMENT
73-year-old male w/ past medical history hypertension, ESRD (HD MWF)  insulin-dependent DM presented 4/29/24 to [facility] 4/29 with hiccups and demand ischemia, was treated with baclofen, developed what appears to be toxic encephalopathy. He then sustained a prolonged, complex hospital course over approx 6 weeks, notable for acute respiratory failure, intubation, sepsis, CRRT, DVT, GIB, trach and PEG placement, He is admitted for acute multidisciplinary rehab on 6/14/24 to [facility].     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  #Hospital Acquired Debility   #Dysphagia  #Gait, ADL, Functional impairments  - Comprehensive Multidisciplinary Rehab, PT/OT/ SLP 3 hr/day 5d/wk  - AC: Eliquis 5 BID for DVT, seen on CTA/P 5/12  - ASA81 qD  - Pain: PRN Tylenol, Lidocaine patch   - Absolute Avoidance of Baclofen   - NPO/Tube Feeds as below    #Acute Hypoxemic, Hypercapnic Respiratory Failure, s/p ETT intubation  #s/p tracheostomy   - Albuterol neb q6h   - Continue TC w/ ATC  - Reportedly tolerating PMV during daytime  - Respiratory to follow in house  Pulmonary consulted- Xray- ? downsize trach- secretions Upper airway  PMV during the day    #ESRD on HD  #Fistula stenosis   #Anemia   - Continue HD M/W/F per renal   - Access is RUE AVF  - Regular monitoring of electrolytes  - EPOGEN w/ HD  - Iron supplementation, 300 mg daily     #HTN  - Coreg 25 q12h  - Hydralazine 100 mg TID  - Norvasc 10 mg/D  - Isordil would be next agent added    #NSTEMI, Demand Ischemia, resolved  - Initially, troponins mildly elevated  - TTE (4/30): EF 72, normal, no regional wall motion abnormalities   - No need to pursue cath at this time   - 20 mg lipitor at bedtime     #IDDM2, A1c 6.9  - NPH 4u q6h  - FS, ISS TIDAC    #R Common Iliac DVT  - Initially treated with heparin but then developed questionable GIB, transitioned to Eliquis   - IR was consulted, no plan for IVC filter  - Continue Eliquis 5 mg BID     #GI  - Concern melena 5/27, resolved, transfused   - GI consulted, not candidate for endoscopy due to surgical risk  - Concern for upper GI Bleed, GI felt not true bleed  - Continue PPI BID   - MIralax 17g daily  - Senna 2 @ bedtime   - Ducolax suppository daily PRN     #Oropharyngeal Dysphagia  #s/p PEG (5/17)  - Failed green dye 6/1   - Continue feeds: Card Steady 10 cc/hr incr. q6 goal 45cc/hr - feeds changed to Bolus- tolerating no signs of aspiration  Repeat MBS pending - cleared by speech for ICE chips      #Mood / Cognition:  - Neuropsych evaluation given prolonged and complex hospitalization  - Chart mentions concern for depression w/ possible suicidal ideation     #/Bladder:  - Check urinary status on arrival, possibly anuric   spont voiding    #Dysphagia:    - Diet: NPO/Tube Feeds  - Ongoing SLP assessment  - Nutrition to follow    #Skin/ Pressure Injury Prevention:  - Assessment on admission   - Incisions:    #Precautions/ Restrictions  - Falls, Aspiration Precautions   - COVID PCR:

## 2024-06-18 NOTE — PROGRESS NOTE ADULT - SUBJECTIVE AND OBJECTIVE BOX
|-------------------------------------------|                       Subjective   |-------------------------------------------|    no new complaints  knee pain better  chart reviewed, nephrology, physiatry assessments    |------------------------------------------|                       Objective  |------------------------------------------|    Vital Signs Last 24 Hrs  T(C): 36.7 (18 Jun 2024 07:37), Max: 36.7 (18 Jun 2024 07:37)  T(F): 98.1 (18 Jun 2024 07:37), Max: 98.1 (18 Jun 2024 07:37)  HR: 58 (18 Jun 2024 08:10) (56 - 78)  BP: 137/62 (18 Jun 2024 07:37) (120/64 - 156/62)  BP(mean): --  RR: 17 (18 Jun 2024 07:37) (16 - 18)  SpO2: 98% (18 Jun 2024 08:10) (98% - 100%)    Parameters below as of 18 Jun 2024 08:10  Patient On (Oxygen Delivery Method): tracheostomy collar    Examination:   General: NAD, Comfortable  Pulm: CTABL No Rhonchi Rales or wheezes  Neck: Supple, No JVD, trach, no blood no pus  CVS: RRR No rubs murmurs or gallops  Abdomen: Soft, Nontender, Nondistended; No masses or organomegally  Extremities: No calf tenderness, No pitting edema, knees w/ full ROM, no effusion, no warmth or injury    Labs: Reviewed and compared, of note...  microcytic anemia with hgb 9.6  POCT , 115  hyponatremia 124  cr 5.97  alk phos elevated 275 |-------------------------------------------|                       Subjective   |-------------------------------------------|    no new complaints  noted to have hyponatremia last labs  denies, dizziness, no diplopia, since blood draw  knee pain better  chart reviewed, nephrology, physiatry assessments    |------------------------------------------|                       Objective  |------------------------------------------|    Vital Signs Last 24 Hrs  T(C): 36.7 (18 Jun 2024 07:37), Max: 36.7 (18 Jun 2024 07:37)  T(F): 98.1 (18 Jun 2024 07:37), Max: 98.1 (18 Jun 2024 07:37)  HR: 58 (18 Jun 2024 08:10) (56 - 78)  BP: 137/62 (18 Jun 2024 07:37) (120/64 - 156/62)  BP(mean): --  RR: 17 (18 Jun 2024 07:37) (16 - 18)  SpO2: 98% (18 Jun 2024 08:10) (98% - 100%)    Parameters below as of 18 Jun 2024 08:10  Patient On (Oxygen Delivery Method): tracheostomy collar    Examination:   General: NAD, Comfortable  Pulm: CTABL No Rhonchi Rales or wheezes  Neck: Supple, No JVD, trach, no blood no pus  CVS: RRR No rubs murmurs or gallops  Abdomen: Soft, Nontender, Nondistended; No masses or organomegally  Extremities: No calf tenderness, No pitting edema, knees w/ full ROM, no effusion, no warmth or injury    Labs: Reviewed and compared, of note...  microcytic anemia with hgb 9.6  POCT , 115  hyponatremia 124  cr 5.97  alk phos elevated 275

## 2024-06-18 NOTE — PROGRESS NOTE ADULT - SUBJECTIVE AND OBJECTIVE BOX
Follow-up Pulmonary Progress Note  Chief Complaint : Cerebral infarction    pt alert responsive  no secretions  d/w RN bedside    Allergies :No Known Allergies      PAST MEDICAL & SURGICAL HISTORY:  Diabetes    Benign essential HTN    HLD (hyperlipidemia)    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    ESRD on hemodialysis    Arteriovenous fistula        Medications:  MEDICATIONS  (STANDING):  albuterol    0.083% 2.5 milliGRAM(s) Nebulizer every 6 hours  amLODIPine   Tablet 10 milliGRAM(s) Oral daily  apixaban 5 milliGRAM(s) Enteral Tube two times a day  aspirin  chewable 81 milliGRAM(s) Oral daily  atorvastatin 20 milliGRAM(s) Oral at bedtime  carvedilol 25 milliGRAM(s) Oral every 12 hours  chlorhexidine 2% Cloths 1 Application(s) Topical <User Schedule>  dextrose 10% Bolus 125 milliLiter(s) IV Bolus once  dextrose 5%. 1000 milliLiter(s) (50 mL/Hr) IV Continuous <Continuous>  dextrose 5%. 1000 milliLiter(s) (100 mL/Hr) IV Continuous <Continuous>  dextrose 50% Injectable 12.5 Gram(s) IV Push once  dextrose 50% Injectable 25 Gram(s) IV Push once  epoetin reilly-epbx (RETACRIT) Injectable 15209 Unit(s) IV Push <User Schedule>  ferrous    sulfate Liquid 300 milliGRAM(s) Enteral Tube daily  glucagon  Injectable 1 milliGRAM(s) IntraMuscular once  hydrALAZINE 100 milliGRAM(s) Oral three times a day  insulin lispro (ADMELOG) corrective regimen sliding scale   SubCutaneous Before meals and at bedtime  insulin NPH human recombinant 4 Unit(s) SubCutaneous every 6 hours  lidocaine   4% Patch 1 Patch Transdermal every 24 hours  lidocaine   4% Patch 1 Patch Transdermal every 24 hours  methyl salicylate 15%/menthol 10% Topical Cream 1 Application(s) Topical <User Schedule>  Nephro-noam 1 Tablet(s) Oral daily  pantoprazole   Suspension 40 milliGRAM(s) Enteral Tube two times a day  polyethylene glycol 3350 17 Gram(s) Oral daily  senna 2 Tablet(s) Oral at bedtime    MEDICATIONS  (PRN):  acetaminophen   Oral Liquid .. 650 milliGRAM(s) Oral every 6 hours PRN Moderate Pain (4 - 6)  bisacodyl Suppository 10 milliGRAM(s) Rectal daily PRN Constipation  dextrose Oral Gel 15 Gram(s) Oral once PRN Blood Glucose LESS THAN 70 milliGRAM(s)/deciliter  melatonin 3 milliGRAM(s) Oral at bedtime PRN Insomnia      Antibiotics History      Heme Medications   apixaban 5 milliGRAM(s) Enteral Tube two times a day, 06-14-24 @ 18:29  aspirin  chewable 81 milliGRAM(s) Oral daily, 06-15-24 @ 00:00      GI Medications  bisacodyl Suppository 10 milliGRAM(s) Rectal daily, 06-14-24 @ 18:30, Routine PRN  pantoprazole   Suspension 40 milliGRAM(s) Enteral Tube two times a day, 06-14-24 @ 21:14, Routine  polyethylene glycol 3350 17 Gram(s) Oral daily, 06-15-24 @ 00:00, Routine  senna 2 Tablet(s) Oral at bedtime, 06-14-24 @ 18:29, Routine        LABS:                        9.6    7.60  )-----------( 333      ( 17 Jun 2024 15:15 )             28.6     06-17    124<L>  |  89<L>  |  95<H>  ----------------------------<  180<H>  4.8   |  28  |  5.97<H>    Ca    8.4      17 Jun 2024 15:15  Phos  4.9     06-17    TPro  6.9  /  Alb  2.6<L>  /  TBili  0.4  /  DBili  x   /  AST  24  /  ALT  22  /  AlkPhos  275<H>  06-17              Urinalysis Basic - ( 17 Jun 2024 15:15 )    Color: x / Appearance: x / SG: x / pH: x  Gluc: 180 mg/dL / Ketone: x  / Bili: x / Urobili: x   Blood: x / Protein: x / Nitrite: x   Leuk Esterase: x / RBC: x / WBC x   Sq Epi: x / Non Sq Epi: x / Bacteria: x              CULTURES: (if applicable)    Culture - Sputum (collected 06-06-24 @ 11:27)  Source: .Sputum Sputum  Gram Stain (06-06-24 @ 18:13):    Few Squamous epithelial cells per low power field    Few polymorphonuclear leukocytes per low power field    Moderate Gram Negative Rods per oil power field    Few Gram Negative Diplococci per oil power field  Final Report (06-08-24 @ 14:37):    Normal Respiratory Jocelyn present            CAPILLARY BLOOD GLUCOSE      POCT Blood Glucose.: 116 mg/dL (18 Jun 2024 17:12)      RADIOLOGY  CXR:    small right lower lobe atelectasis      VITALS:  T(C): 36.7 (06-18-24 @ 07:37), Max: 36.7 (06-18-24 @ 07:37)  T(F): 98.1 (06-18-24 @ 07:37), Max: 98.1 (06-18-24 @ 07:37)  HR: 61 (06-18-24 @ 15:00) (58 - 78)  BP: 150/65 (06-18-24 @ 13:40) (120/64 - 156/62)  BP(mean): --  ABP: --  ABP(mean): --  RR: 17 (06-18-24 @ 07:37) (16 - 18)  SpO2: 99% (06-18-24 @ 15:00) (98% - 100%)  CVP(mm Hg): --  CVP(cm H2O): --    Ins and Outs     06-17-24 @ 07:01  -  06-18-24 @ 07:00  --------------------------------------------------------  IN: 250 mL / OUT: 1500 mL / NET: -1250 mL    06-18-24 @ 07:01  -  06-18-24 @ 18:03  --------------------------------------------------------  IN: 774 mL / OUT: 0 mL / NET: 774 mL                I&O's Detail    17 Jun 2024 07:01  -  18 Jun 2024 07:00  --------------------------------------------------------  IN:    Nepro: 250 mL  Total IN: 250 mL    OUT:    Other (mL): 1500 mL  Total OUT: 1500 mL    Total NET: -1250 mL      18 Jun 2024 07:01  -  18 Jun 2024 18:03  --------------------------------------------------------  IN:    IV PiggyBack: 300 mL    Nepro: 474 mL  Total IN: 774 mL    OUT:  Total OUT: 0 mL    Total NET: 774 mL

## 2024-06-18 NOTE — PROGRESS NOTE ADULT - SUBJECTIVE AND OBJECTIVE BOX
73-year-old male w/ past medical history hypertension, ESRD (HD MWF)  insulin-dependent DM presented 4/29/24 to [facility] with hiccups, chest pain of several days duration, admitted for concern demand ischemia. Early on, he was givenv baclofen for his hiccups, but soon developed AMS with 2 RRTs by 5/2/24, prompting intubation, upgrade to MICU. There was concern that baclofen toxicity was the culprit of his then-encephalopathic state, as patient had also missed a HD session due to a clotted fistula.     In coming days, he was treated empirically with 5 days of zosyn, had a negative CTH and LP, and had an EEG that did not capture a seizure but showed diffuse non-specific cerebral dysfunction - c/w toxic-metabolic encephalopathies. On 5/6, an MRI showed that the patient had sustained R pontine and cerebellar stroke. He was extubated on 5/9, but owing to a challenging extubation became septic w/ B Cereus, treated with Vanc. He had trach placed 5/14, PEG 5/17, then downgraded.     On the floors, he was followed by several services, including Neurology, who mention the strokes were embolic appearing. Subsequent hospital course notable for nonocclusive R common iliac DVT, for which he was started on a heparin drip which was stopped when patient developed an upper GI Bleed, though GI was consulted and felt he did not have an overt bleed. His dialysis fistula site was stenotic, s/p fistulogram, ultimately changed to a RUE AVF 6/5. He was ultimately started on Eliquis 6/7 for the DVT. Medically stable, he is admitted to [facility] on 6/14/24.     ROS/subjective:  patient seen and evaluated bedside  in Bed receiving speech therapy  less secretions - tolerating PMV  Depressed  Knee pain on attempted extension  Weakness Right > Left  Voiding, moving Bowels- last BM 6/17- incontinent  no dizziness, no headaches, no nausea, no vomiting, no SOB, no chest pain      MEDICATIONS  (STANDING):  albuterol    0.083% 2.5 milliGRAM(s) Nebulizer every 6 hours  amLODIPine   Tablet 10 milliGRAM(s) Oral daily  apixaban 5 milliGRAM(s) Enteral Tube two times a day  aspirin  chewable 81 milliGRAM(s) Oral daily  atorvastatin 20 milliGRAM(s) Oral at bedtime  carvedilol 25 milliGRAM(s) Oral every 12 hours  chlorhexidine 2% Cloths 1 Application(s) Topical <User Schedule>  dextrose 10% Bolus 125 milliLiter(s) IV Bolus once  dextrose 5%. 1000 milliLiter(s) (50 mL/Hr) IV Continuous <Continuous>  dextrose 5%. 1000 milliLiter(s) (100 mL/Hr) IV Continuous <Continuous>  dextrose 50% Injectable 12.5 Gram(s) IV Push once  dextrose 50% Injectable 25 Gram(s) IV Push once  epoetin reilly-epbx (RETACRIT) Injectable 53799 Unit(s) IV Push <User Schedule>  ferrous    sulfate Liquid 300 milliGRAM(s) Enteral Tube daily  glucagon  Injectable 1 milliGRAM(s) IntraMuscular once  hydrALAZINE 100 milliGRAM(s) Oral three times a day  insulin lispro (ADMELOG) corrective regimen sliding scale   SubCutaneous Before meals and at bedtime  insulin NPH human recombinant 4 Unit(s) SubCutaneous every 6 hours  lidocaine   4% Patch 1 Patch Transdermal every 24 hours  lidocaine   4% Patch 1 Patch Transdermal every 24 hours  methyl salicylate 15%/menthol 10% Topical Cream 1 Application(s) Topical <User Schedule>  Nephro-noam 1 Tablet(s) Oral daily  pantoprazole   Suspension 40 milliGRAM(s) Enteral Tube two times a day  polyethylene glycol 3350 17 Gram(s) Oral daily  senna 2 Tablet(s) Oral at bedtime    MEDICATIONS  (PRN):  acetaminophen   Oral Liquid .. 650 milliGRAM(s) Oral every 6 hours PRN Moderate Pain (4 - 6)  bisacodyl Suppository 10 milliGRAM(s) Rectal daily PRN Constipation  dextrose Oral Gel 15 Gram(s) Oral once PRN Blood Glucose LESS THAN 70 milliGRAM(s)/deciliter  melatonin 3 milliGRAM(s) Oral at bedtime PRN Insomnia                            9.6    7.60  )-----------( 333      ( 17 Jun 2024 15:15 )             28.6     06-17    124<L>  |  89<L>  |  95<H>  ----------------------------<  180<H>  4.8   |  28  |  5.97<H>    Ca    8.4      17 Jun 2024 15:15  Phos  4.9     06-17    TPro  6.9  /  Alb  2.6<L>  /  TBili  0.4  /  DBili  x   /  AST  24  /  ALT  22  /  AlkPhos  275<H>  06-17    Urinalysis Basic - ( 17 Jun 2024 15:15 )    Color: x / Appearance: x / SG: x / pH: x  Gluc: 180 mg/dL / Ketone: x  / Bili: x / Urobili: x   Blood: x / Protein: x / Nitrite: x   Leuk Esterase: x / RBC: x / WBC x   Sq Epi: x / Non Sq Epi: x / Bacteria: x        CAPILLARY BLOOD GLUCOSE      POCT Blood Glucose.: 206 mg/dL (18 Jun 2024 11:38)  POCT Blood Glucose.: 151 mg/dL (18 Jun 2024 07:31)  POCT Blood Glucose.: 115 mg/dL (17 Jun 2024 22:04)  POCT Blood Glucose.: 98 mg/dL (17 Jun 2024 18:46)      Vital Signs Last 24 Hrs  T(C): 36.7 (18 Jun 2024 07:37), Max: 36.7 (18 Jun 2024 07:37)  T(F): 98.1 (18 Jun 2024 07:37), Max: 98.1 (18 Jun 2024 07:37)  HR: 58 (18 Jun 2024 08:10) (56 - 78)  BP: 137/62 (18 Jun 2024 07:37) (120/64 - 156/62)  BP(mean): --  RR: 17 (18 Jun 2024 07:37) (16 - 18)  SpO2: 98% (18 Jun 2024 08:10) (98% - 100%)    Parameters below as of 18 Jun 2024 08:10  Patient On (Oxygen Delivery Method): tracheostomy collar        Constitutional - NAD, Comfortable, cachectic  HEENT - NCAT,   Neck - Supple, No limited ROM  Chest - good chest expansion, good respiratory effort. + trach  Cardio - warm and well perfused,   Abdomen -  Soft, NTND. + peg.   Extremities - No peripheral edema, No calf tenderness. RUE fistula.   Neurologic Exam:                    Cognitive -             Orientation: AO to himself, year, month and day. Not to location.             Attention: Recall 0/3 objects with cuing            Memory: Recent/ remote memory intact            Thought: process, content appropriate     Speech - +hypophonia. Fluent, Comprehensible, No dysarthria, No aphasia      Cranial Nerves - No facial asymmetry, Tongue midline, Shoulder shrug intact     Motor -                      LEFT    UE - ShAB 4/5, EF 4/5, EE 3/5, WE 4/5,  WNL                    RIGHT UE - ShAB 4/5, EF 4/5, EE 3/5, WE 4/5,  WNL                    LEFT    LE - HF 3/5, KE 4/5, DF 4/5, PF 4/5 pain both knees limiting exam                    RIGHT LE - HF 3/5, KE 4/5, DF 4/5, PF 4/5        Sensory - Intact to LT bilateral in face, arm and legs.      Reflexes - DTR 1 / 4 biceps symmetric. neg Latham's b/l, No clonus.  Psychiatric - Mood stable, Affect WNL  Skin on admission: no sacral pressure injuries.

## 2024-06-19 DIAGNOSIS — I10 ESSENTIAL (PRIMARY) HYPERTENSION: ICD-10-CM

## 2024-06-19 DIAGNOSIS — N18.6 END STAGE RENAL DISEASE: ICD-10-CM

## 2024-06-19 DIAGNOSIS — E11.65 TYPE 2 DIABETES MELLITUS WITH HYPERGLYCEMIA: ICD-10-CM

## 2024-06-19 DIAGNOSIS — I63.9 CEREBRAL INFARCTION, UNSPECIFIED: ICD-10-CM

## 2024-06-19 DIAGNOSIS — J96.01 ACUTE RESPIRATORY FAILURE WITH HYPOXIA: ICD-10-CM

## 2024-06-19 LAB
ANION GAP SERPL CALC-SCNC: 11 MMOL/L — SIGNIFICANT CHANGE UP (ref 5–17)
BUN SERPL-MCNC: 70 MG/DL — HIGH (ref 7–23)
CALCIUM SERPL-MCNC: 8.4 MG/DL — SIGNIFICANT CHANGE UP (ref 8.4–10.5)
CHLORIDE SERPL-SCNC: 90 MMOL/L — LOW (ref 96–108)
CO2 SERPL-SCNC: 27 MMOL/L — SIGNIFICANT CHANGE UP (ref 22–31)
CREAT SERPL-MCNC: 5.36 MG/DL — HIGH (ref 0.5–1.3)
EGFR: 11 ML/MIN/1.73M2 — LOW
GLUCOSE BLDC GLUCOMTR-MCNC: 149 MG/DL — HIGH (ref 70–99)
GLUCOSE BLDC GLUCOMTR-MCNC: 160 MG/DL — HIGH (ref 70–99)
GLUCOSE BLDC GLUCOMTR-MCNC: 264 MG/DL — HIGH (ref 70–99)
GLUCOSE BLDC GLUCOMTR-MCNC: 89 MG/DL — SIGNIFICANT CHANGE UP (ref 70–99)
GLUCOSE SERPL-MCNC: 220 MG/DL — HIGH (ref 70–99)
HCT VFR BLD CALC: 26.2 % — LOW (ref 39–50)
HGB BLD-MCNC: 8.4 G/DL — LOW (ref 13–17)
MCHC RBC-ENTMCNC: 23.2 PG — LOW (ref 27–34)
MCHC RBC-ENTMCNC: 32.1 GM/DL — SIGNIFICANT CHANGE UP (ref 32–36)
MCV RBC AUTO: 72.4 FL — LOW (ref 80–100)
NRBC # BLD: 0 /100 WBCS — SIGNIFICANT CHANGE UP (ref 0–0)
PHOSPHATE SERPL-MCNC: 3.2 MG/DL — SIGNIFICANT CHANGE UP (ref 2.5–4.5)
PLATELET # BLD AUTO: 281 K/UL — SIGNIFICANT CHANGE UP (ref 150–400)
POTASSIUM SERPL-MCNC: 4 MMOL/L — SIGNIFICANT CHANGE UP (ref 3.5–5.3)
POTASSIUM SERPL-SCNC: 4 MMOL/L — SIGNIFICANT CHANGE UP (ref 3.5–5.3)
RBC # BLD: 3.62 M/UL — LOW (ref 4.2–5.8)
RBC # FLD: 22.5 % — HIGH (ref 10.3–14.5)
SODIUM SERPL-SCNC: 128 MMOL/L — LOW (ref 135–145)
WBC # BLD: 9.31 K/UL — SIGNIFICANT CHANGE UP (ref 3.8–10.5)
WBC # FLD AUTO: 9.31 K/UL — SIGNIFICANT CHANGE UP (ref 3.8–10.5)

## 2024-06-19 PROCEDURE — 99232 SBSQ HOSP IP/OBS MODERATE 35: CPT | Mod: GC

## 2024-06-19 PROCEDURE — 99232 SBSQ HOSP IP/OBS MODERATE 35: CPT

## 2024-06-19 RX ORDER — MODAFINIL 100 MG/1
50 TABLET ORAL
Refills: 0 | Status: DISCONTINUED | OUTPATIENT
Start: 2024-06-19 | End: 2024-06-25

## 2024-06-19 RX ADMIN — MENTHOL, METHYL SALICYLATE 1 APPLICATION(S): 63; 210 PATCH PERCUTANEOUS; TOPICAL; TRANSDERMAL at 06:35

## 2024-06-19 RX ADMIN — MODAFINIL 50 MILLIGRAM(S): 100 TABLET ORAL at 14:03

## 2024-06-19 RX ADMIN — LIDOCAINE HCL 1 PATCH: 28 GEL TOPICAL at 07:37

## 2024-06-19 RX ADMIN — ASPIRIN 81 MILLIGRAM(S): 325 TABLET, FILM COATED ORAL at 12:18

## 2024-06-19 RX ADMIN — AMLODIPINE BESYLATE 10 MILLIGRAM(S): 2.5 TABLET ORAL at 06:33

## 2024-06-19 RX ADMIN — PANTOPRAZOLE SODIUM 40 MILLIGRAM(S): 40 INJECTION, POWDER, FOR SOLUTION INTRAVENOUS at 06:33

## 2024-06-19 RX ADMIN — Medication 4 UNIT(S): at 12:12

## 2024-06-19 RX ADMIN — HYDRALAZINE HYDROCHLORIDE 100 MILLIGRAM(S): 50 TABLET ORAL at 06:33

## 2024-06-19 RX ADMIN — INSULIN LISPRO 3: 100 INJECTION, SOLUTION SUBCUTANEOUS at 12:13

## 2024-06-19 RX ADMIN — LIDOCAINE HCL 1 PATCH: 28 GEL TOPICAL at 07:38

## 2024-06-19 RX ADMIN — ERYTHROPOIETIN 10000 UNIT(S): 4000 INJECTION, SOLUTION INTRAVENOUS; SUBCUTANEOUS at 07:03

## 2024-06-19 RX ADMIN — HYDRALAZINE HYDROCHLORIDE 100 MILLIGRAM(S): 50 TABLET ORAL at 21:11

## 2024-06-19 RX ADMIN — CARVEDILOL PHOSPHATE 25 MILLIGRAM(S): 80 CAPSULE, EXTENDED RELEASE ORAL at 06:33

## 2024-06-19 RX ADMIN — Medication 4 UNIT(S): at 08:04

## 2024-06-19 RX ADMIN — APIXABAN 5 MILLIGRAM(S): 5 TABLET, FILM COATED ORAL at 18:52

## 2024-06-19 RX ADMIN — LIDOCAINE HCL 1 PATCH: 28 GEL TOPICAL at 18:59

## 2024-06-19 RX ADMIN — PANTOPRAZOLE SODIUM 40 MILLIGRAM(S): 40 INJECTION, POWDER, FOR SOLUTION INTRAVENOUS at 18:52

## 2024-06-19 RX ADMIN — Medication 1 APPLICATION(S): at 07:04

## 2024-06-19 RX ADMIN — ATORVASTATIN CALCIUM 20 MILLIGRAM(S): 20 TABLET, FILM COATED ORAL at 21:10

## 2024-06-19 RX ADMIN — Medication 2.5 MILLIGRAM(S): at 21:00

## 2024-06-19 RX ADMIN — Medication 2.5 MILLIGRAM(S): at 08:21

## 2024-06-19 RX ADMIN — MENTHOL, METHYL SALICYLATE 1 APPLICATION(S): 63; 210 PATCH PERCUTANEOUS; TOPICAL; TRANSDERMAL at 12:46

## 2024-06-19 RX ADMIN — APIXABAN 5 MILLIGRAM(S): 5 TABLET, FILM COATED ORAL at 06:33

## 2024-06-19 RX ADMIN — Medication 2 TABLET(S): at 21:10

## 2024-06-19 RX ADMIN — Medication 1 TABLET(S): at 12:17

## 2024-06-19 RX ADMIN — Medication 300 MILLIGRAM(S): at 12:17

## 2024-06-19 RX ADMIN — HYDRALAZINE HYDROCHLORIDE 100 MILLIGRAM(S): 50 TABLET ORAL at 14:02

## 2024-06-19 RX ADMIN — MODAFINIL 50 MILLIGRAM(S): 100 TABLET ORAL at 11:01

## 2024-06-19 NOTE — PROGRESS NOTE ADULT - TIME-BASED
“You can access the FollowHealth Patient Portal, offered by [facility], by registering with the following website: [email or web address]” [email or web address]

## 2024-06-19 NOTE — PROGRESS NOTE ADULT - SUBJECTIVE AND OBJECTIVE BOX
Resting     Vital Signs Last 24 Hrs  T(C): 36.4 (06-19-24 @ 19:48), Max: 36.4 (06-19-24 @ 14:50)  T(F): 97.5 (06-19-24 @ 19:48), Max: 97.6 (06-19-24 @ 17:50)  HR: 60 (06-19-24 @ 21:00) (54 - 68)  BP: 151/63 (06-19-24 @ 19:48) (126/58 - 155/70)  RR: 16 (06-19-24 @ 19:48) (16 - 16)  SpO2: 98% (06-19-24 @ 21:00) (97% - 100%)    I&O's Detail    18 Jun 2024 07:01  -  19 Jun 2024 07:00  --------------------------------------------------------  IN:    IV PiggyBack: 300 mL    Nepro: 924 mL  Total IN: 1224 mL    OUT:  Total OUT: 0 mL    19 Jun 2024 07:01  -  19 Jun 2024 23:45  --------------------------------------------------------  IN:    Nepro: 474 mL  Total IN: 474 mL    OUT:    Other (mL): 1500 mL  Total OUT: 1500 mL    Lungs b/l air entry  Heart S1S2  Abd soft ND  Extremities: tr edema  RUE avf                                      8.4    9.31  )-----------( 281      ( 19 Jun 2024 14:00 )             26.2     19 Jun 2024 14:00    128    |  90     |  70     ----------------------------<  220    4.0     |  27     |  5.36     Ca    8.4        19 Jun 2024 14:00  Phos  3.2       19 Jun 2024 14:00    acetaminophen   Oral Liquid .. 650 milliGRAM(s) Oral every 6 hours PRN  albuterol    0.083% 2.5 milliGRAM(s) Nebulizer every 6 hours  amLODIPine   Tablet 10 milliGRAM(s) Oral daily  apixaban 5 milliGRAM(s) Enteral Tube two times a day  aspirin  chewable 81 milliGRAM(s) Oral daily  atorvastatin 20 milliGRAM(s) Oral at bedtime  bisacodyl Suppository 10 milliGRAM(s) Rectal daily PRN  carvedilol 25 milliGRAM(s) Oral every 12 hours  chlorhexidine 2% Cloths 1 Application(s) Topical <User Schedule>  dextrose 10% Bolus 125 milliLiter(s) IV Bolus once  dextrose 5%. 1000 milliLiter(s) IV Continuous <Continuous>  dextrose 5%. 1000 milliLiter(s) IV Continuous <Continuous>  dextrose 50% Injectable 12.5 Gram(s) IV Push once  dextrose 50% Injectable 25 Gram(s) IV Push once  dextrose Oral Gel 15 Gram(s) Oral once PRN  epoetin reilly-epbx (RETACRIT) Injectable 58767 Unit(s) IV Push <User Schedule>  ferrous    sulfate Liquid 300 milliGRAM(s) Enteral Tube daily  glucagon  Injectable 1 milliGRAM(s) IntraMuscular once  hydrALAZINE 100 milliGRAM(s) Oral three times a day  insulin lispro (ADMELOG) corrective regimen sliding scale   SubCutaneous Before meals and at bedtime  insulin NPH human recombinant 4 Unit(s) SubCutaneous every 6 hours  lidocaine   4% Patch 1 Patch Transdermal every 24 hours  lidocaine   4% Patch 1 Patch Transdermal every 24 hours  melatonin 3 milliGRAM(s) Oral at bedtime PRN  methyl salicylate 15%/menthol 10% Topical Cream 1 Application(s) Topical <User Schedule>  modafinil 50 milliGRAM(s) Oral <User Schedule>  Nephro-noam 1 Tablet(s) Oral daily  pantoprazole   Suspension 40 milliGRAM(s) Enteral Tube two times a day  polyethylene glycol 3350 17 Gram(s) Oral daily  senna 2 Tablet(s) Oral at bedtime    A/P:    ESRD on HD  S/p complicated hospital course as per HPI  S/p CVA, trach, PEG  HD MWF  TMP 1.5kg w/HD today  Epoetin w/HD 3 x week  Bld work w/each HD     [phone]

## 2024-06-19 NOTE — PROGRESS NOTE ADULT - ASSESSMENT
73-year-old male w/ past medical history hypertension, ESRD (HD MWF)  insulin-dependent DM presented 4/29/24 to [facility] 4/29 with hiccups and demand ischemia, was treated with baclofen, developed what appears to be toxic encephalopathy. He then sustained a prolonged, complex hospital course over approx 6 weeks, notable for acute respiratory failure, intubation, sepsis, CRRT, DVT, GIB, trach and PEG placement, He is admitted for acute multidisciplinary rehab on 6/14/24 to [facility].     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  #Hospital Acquired Debility   #Dysphagia  #Gait, ADL, Functional impairments  - Comprehensive Multidisciplinary Rehab, PT/OT/ SLP 3 hr/day 5d/wk  - AC: Eliquis 5 BID for DVT, seen on CTA/P 5/12  - ASA81 qD  - Pain: PRN Tylenol, Lidocaine patch   - Absolute Avoidance of Baclofen   - NPO/Tube Feeds as below    #Acute Hypoxemic, Hypercapnic Respiratory Failure, s/p ETT intubation  #s/p tracheostomy   - Albuterol neb q6h   - Continue TC w/ ATC  - Reportedly tolerating PMV during daytime  - Respiratory to follow in house  Pulmonary consulted- Xray- PMV/ capping per speech  PMV during the day    #ESRD on HD  #Fistula stenosis   #Anemia   - Continue HD M/W/F per renal   - Access is RUE AVF  - Regular monitoring of electrolytes  - EPOGEN w/ HD  - Iron supplementation, 300 mg daily     #HTN  - Coreg 25 q12h  - Hydralazine 100 mg TID  - Norvasc 10 mg/D  - Isordil would be next agent added    #NSTEMI, Demand Ischemia, resolved  - Initially, troponins mildly elevated  - TTE (4/30): EF 72, normal, no regional wall motion abnormalities   - No need to pursue cath at this time   - 20 mg lipitor at bedtime     #IDDM2, A1c 6.9  - NPH 4u q6h  - FS, ISS TIDAC    #R Common Iliac DVT  - Initially treated with heparin but then developed questionable GIB, transitioned to Eliquis   - IR was consulted, no plan for IVC filter  - Continue Eliquis 5 mg BID     #GI  - Concern melena 5/27, resolved, transfused   - GI consulted, not candidate for endoscopy due to surgical risk  - Concern for upper GI Bleed, GI felt not true bleed  - Continue PPI BID   - MIralax 17g daily  - Senna 2 @ bedtime   - Ducolax suppository daily PRN     #Oropharyngeal Dysphagia  #s/p PEG (5/17)  - Failed green dye 6/1   - Continue feeds: Card Steady 10 cc/hr incr. q6 goal 45cc/hr - feeds changed to Bolus- tolerating no signs of aspiration  Repeat MBS in AM - cleared by speech for ICE chips      #Mood / Cognition:  - Neuropsych evaluation given prolonged and complex hospitalization  - Chart mentions concern for depression w/ possible suicidal ideation     #/Bladder:  - Check urinary status on arrival, possibly anuric   spont voiding    #Dysphagia:    - Diet: NPO/Tube Feeds  - Ongoing SLP assessment  - Nutrition to follow    #Skin/ Pressure Injury Prevention:  - Assessment on admission   - Incisions:    #Precautions/ Restrictions  - Falls, Aspiration Precautions   - COVID PCR:

## 2024-06-19 NOTE — PROGRESS NOTE ADULT - SUBJECTIVE AND OBJECTIVE BOX
Patient is a 73y old  Male who presents with a chief complaint of CVA, Encephalopathy (19 Jun 2024 13:22)      Patient seen and examined at bedside.  - no events overnight. today  seems down, but does answer all questions, no n/v/abd pain/cough/chest pain.  ALLERGIES:  No Known Allergies    MEDICATIONS  (STANDING):  albuterol    0.083% 2.5 milliGRAM(s) Nebulizer every 6 hours  amLODIPine   Tablet 10 milliGRAM(s) Oral daily  apixaban 5 milliGRAM(s) Enteral Tube two times a day  aspirin  chewable 81 milliGRAM(s) Oral daily  atorvastatin 20 milliGRAM(s) Oral at bedtime  carvedilol 25 milliGRAM(s) Oral every 12 hours  chlorhexidine 2% Cloths 1 Application(s) Topical <User Schedule>  dextrose 10% Bolus 125 milliLiter(s) IV Bolus once  dextrose 5%. 1000 milliLiter(s) (50 mL/Hr) IV Continuous <Continuous>  dextrose 5%. 1000 milliLiter(s) (100 mL/Hr) IV Continuous <Continuous>  dextrose 50% Injectable 12.5 Gram(s) IV Push once  dextrose 50% Injectable 25 Gram(s) IV Push once  epoetin reilly-epbx (RETACRIT) Injectable 17858 Unit(s) IV Push <User Schedule>  ferrous    sulfate Liquid 300 milliGRAM(s) Enteral Tube daily  glucagon  Injectable 1 milliGRAM(s) IntraMuscular once  hydrALAZINE 100 milliGRAM(s) Oral three times a day  insulin lispro (ADMELOG) corrective regimen sliding scale   SubCutaneous Before meals and at bedtime  insulin NPH human recombinant 4 Unit(s) SubCutaneous every 6 hours  lidocaine   4% Patch 1 Patch Transdermal every 24 hours  lidocaine   4% Patch 1 Patch Transdermal every 24 hours  methyl salicylate 15%/menthol 10% Topical Cream 1 Application(s) Topical <User Schedule>  modafinil 50 milliGRAM(s) Oral <User Schedule>  Nephro-noam 1 Tablet(s) Oral daily  pantoprazole   Suspension 40 milliGRAM(s) Enteral Tube two times a day  polyethylene glycol 3350 17 Gram(s) Oral daily  senna 2 Tablet(s) Oral at bedtime    MEDICATIONS  (PRN):  acetaminophen   Oral Liquid .. 650 milliGRAM(s) Oral every 6 hours PRN Moderate Pain (4 - 6)  bisacodyl Suppository 10 milliGRAM(s) Rectal daily PRN Constipation  dextrose Oral Gel 15 Gram(s) Oral once PRN Blood Glucose LESS THAN 70 milliGRAM(s)/deciliter  melatonin 3 milliGRAM(s) Oral at bedtime PRN Insomnia    Vital Signs Last 24 Hrs  T(F): 97.4 (19 Jun 2024 07:41), Max: 97.8 (18 Jun 2024 20:00)  HR: 57 (19 Jun 2024 08:22) (57 - 64)  BP: 129/48 (19 Jun 2024 07:41) (129/48 - 155/70)  RR: 16 (19 Jun 2024 07:41) (16 - 18)  SpO2: 97% (19 Jun 2024 08:22) (97% - 98%)  I&O's Summary    18 Jun 2024 07:01  -  19 Jun 2024 07:00  --------------------------------------------------------  IN: 1224 mL / OUT: 0 mL / NET: 1224 mL    19 Jun 2024 07:01  -  19 Jun 2024 15:18  --------------------------------------------------------  IN: 474 mL / OUT: 0 mL / NET: 474 mL          PHYSICAL EXAM:  General: NAD, A/O x 3  ENT: MMM, no scleral icterus  Neck: Supple, No JVD  Lungs: Clear to auscultation bilaterally, no wheezes, rales, rhonchi. + trach  Cardio: RRR, S1/S2, No murmurs  Abdomen: Soft, Nontender, Nondistended; Bowel sounds present  Extremities: No calf tenderness, No pitting edema    LABS:                        8.4    9.31  )-----------( 281      ( 19 Jun 2024 14:00 )             26.2       06-17    124  |  89  |  95  ----------------------------<  180  4.8   |  28  |  5.97    Ca    8.4      17 Jun 2024 15:15  Phos  4.9     06-17    TPro  6.9  /  Alb  2.6  /  TBili  0.4  /  DBili  x   /  AST  24  /  ALT  22  /  AlkPhos  275  06-17 05-17 Chol 86 mg/dL LDL -- HDL 27 mg/dL Trig 151 mg/dL              POCT Blood Glucose.: 264 mg/dL (19 Jun 2024 12:06)  POCT Blood Glucose.: 149 mg/dL (19 Jun 2024 07:55)  POCT Blood Glucose.: 109 mg/dL (18 Jun 2024 21:43)  POCT Blood Glucose.: 116 mg/dL (18 Jun 2024 17:12)      Urinalysis Basic - ( 17 Jun 2024 15:15 )    Color: x / Appearance: x / SG: x / pH: x  Gluc: 180 mg/dL / Ketone: x  / Bili: x / Urobili: x   Blood: x / Protein: x / Nitrite: x   Leuk Esterase: x / RBC: x / WBC x   Sq Epi: x / Non Sq Epi: x / Bacteria: x        COVID-19 PCR: NotDetec (06-14-24 @ 18:56)      RADIOLOGY & ADDITIONAL TESTS:    Care Discussed with Consultants/Other Providers:  IDR team

## 2024-06-19 NOTE — PROGRESS NOTE ADULT - SUBJECTIVE AND OBJECTIVE BOX
73-year-old male w/ past medical history hypertension, ESRD (HD MWF)  insulin-dependent DM presented 4/29/24 to [facility] with hiccups, chest pain of several days duration, admitted for concern demand ischemia. Early on, he was given baclofen for his hiccups, but soon developed AMS with 2 RRTs by 5/2/24, prompting intubation, upgrade to MICU. There was concern that baclofen toxicity was the culprit of his then-encephalopathic state, as patient had also missed a HD session due to a clotted fistula.     In coming days, he was treated empirically with 5 days of zosyn, had a negative CTH and LP, and had an EEG that did not capture a seizure but showed diffuse non-specific cerebral dysfunction - c/w toxic-metabolic encephalopathies. On 5/6, an MRI showed that the patient had sustained R pontine and cerebellar stroke. He was extubated on 5/9, but owing to a challenging extubation became septic w/ B Cereus, treated with Vanc. He had trach placed 5/14, PEG 5/17, then downgraded.     On the floors, he was followed by several services, including Neurology, who mention the strokes were embolic appearing. Subsequent hospital course notable for nonocclusive R common iliac DVT, for which he was started on a heparin drip which was stopped when patient developed an upper GI Bleed, though GI was consulted and felt he did not have an overt bleed. His dialysis fistula site was stenotic, s/p fistulogram, ultimately changed to a RUE AVF 6/5. He was ultimately started on Eliquis 6/7 for the DVT. Medically stable, he is admitted to [facility] on 6/14/24.     ROS/subjective:  patient seen and evaluated bedside  Apathetic - non participatory in therapy today-  to start Provigil  less secretions - tolerating PMV  Knee pain better with arden sharma  Voiding, moving Bowels- last BM 6/18- incontinent  no dizziness, no headaches, no nausea, no vomiting, no SOB, no chest pain  for HD today        MEDICATIONS  (STANDING):  albuterol    0.083% 2.5 milliGRAM(s) Nebulizer every 6 hours  amLODIPine   Tablet 10 milliGRAM(s) Oral daily  apixaban 5 milliGRAM(s) Enteral Tube two times a day  aspirin  chewable 81 milliGRAM(s) Oral daily  atorvastatin 20 milliGRAM(s) Oral at bedtime  carvedilol 25 milliGRAM(s) Oral every 12 hours  chlorhexidine 2% Cloths 1 Application(s) Topical <User Schedule>  dextrose 10% Bolus 125 milliLiter(s) IV Bolus once  dextrose 5%. 1000 milliLiter(s) (50 mL/Hr) IV Continuous <Continuous>  dextrose 5%. 1000 milliLiter(s) (100 mL/Hr) IV Continuous <Continuous>  dextrose 50% Injectable 12.5 Gram(s) IV Push once  dextrose 50% Injectable 25 Gram(s) IV Push once  epoetin reilly-epbx (RETACRIT) Injectable 65446 Unit(s) IV Push <User Schedule>  ferrous    sulfate Liquid 300 milliGRAM(s) Enteral Tube daily  glucagon  Injectable 1 milliGRAM(s) IntraMuscular once  hydrALAZINE 100 milliGRAM(s) Oral three times a day  insulin lispro (ADMELOG) corrective regimen sliding scale   SubCutaneous Before meals and at bedtime  insulin NPH human recombinant 4 Unit(s) SubCutaneous every 6 hours  lidocaine   4% Patch 1 Patch Transdermal every 24 hours  lidocaine   4% Patch 1 Patch Transdermal every 24 hours  methyl salicylate 15%/menthol 10% Topical Cream 1 Application(s) Topical <User Schedule>  Nephro-noam 1 Tablet(s) Oral daily  pantoprazole   Suspension 40 milliGRAM(s) Enteral Tube two times a day  polyethylene glycol 3350 17 Gram(s) Oral daily  senna 2 Tablet(s) Oral at bedtime    MEDICATIONS  (PRN):  acetaminophen   Oral Liquid .. 650 milliGRAM(s) Oral every 6 hours PRN Moderate Pain (4 - 6)  bisacodyl Suppository 10 milliGRAM(s) Rectal daily PRN Constipation  dextrose Oral Gel 15 Gram(s) Oral once PRN Blood Glucose LESS THAN 70 milliGRAM(s)/deciliter  melatonin 3 milliGRAM(s) Oral at bedtime PRN Insomnia                                       9.6    7.60  )-----------( 333      ( 17 Jun 2024 15:15 )             28.6   06-17    124<L>  |  89<L>  |  95<H>  ----------------------------<  180<H>  4.8   |  28  |  5.97<H>    Ca    8.4      17 Jun 2024 15:15  Phos  4.9     06-17    TPro  6.9  /  Alb  2.6<L>  /  TBili  0.4  /  DBili  x   /  AST  24  /  ALT  22  /  AlkPhos  275<H>  06-17          CAPILLARY BLOOD GLUCOSE      POCT Blood Glucose.: 264 mg/dL (19 Jun 2024 12:06)  POCT Blood Glucose.: 149 mg/dL (19 Jun 2024 07:55)  POCT Blood Glucose.: 109 mg/dL (18 Jun 2024 21:43)  POCT Blood Glucose.: 116 mg/dL (18 Jun 2024 17:12)      Vital Signs Last 24 Hrs  T(C): 36.3 (19 Jun 2024 07:41), Max: 36.6 (18 Jun 2024 20:00)  T(F): 97.4 (19 Jun 2024 07:41), Max: 97.8 (18 Jun 2024 20:00)  HR: 57 (19 Jun 2024 08:22) (57 - 64)  BP: 129/48 (19 Jun 2024 07:41) (129/48 - 155/70)  BP(mean): --  RR: 16 (19 Jun 2024 07:41) (16 - 18)  SpO2: 97% (19 Jun 2024 08:22) (97% - 99%)    Parameters below as of 19 Jun 2024 08:22  Patient On (Oxygen Delivery Method): tracheostomy collar    Constitutional - NAD, Comfortable, cachectic  HEENT - NCAT,   Neck - Supple, No limited ROM  Chest - good chest expansion, good respiratory effort. + trach with PMV  Cardio - warm and well perfused,   Abdomen -  Soft, NTND. + peg.   Extremities - No peripheral edema, No calf tenderness. RUE fistula.   Neurologic Exam:                    Cognitive -        minimally verbal today- answers only few questions     Speech - +hypophonia. Fluent, Comprehensible, No dysarthria, No aphasia      Cranial Nerves - No facial asymmetry, Tongue midline, Shoulder shrug intact     Motor -                      LEFT    UE - ShAB 4/5, EF 4/5, EE 3/5, WE 4/5,  WNL                    RIGHT UE - ShAB 4/5, EF 4/5, EE 3/5, WE 4/5,  WNL                    LEFT    LE - HF 3/5, KE 4/5, DF 4/5, PF 4/5 Flexes BLES today- minimal pain                    RIGHT LE - HF 3/5, KE 4/5, DF 4/5, PF 4/5        Sensory - Intact to LT bilateral in face, arm and legs.      Reflexes - DTR 1 / 4 biceps symmetric. neg Latham's b/l, No clonus.  Psychiatric - Mood stable, Affect WNL  Skin on admission: no sacral pressure injuries.      IDT in [facility] 6/19  Tentative DC 7/9

## 2024-06-19 NOTE — PROGRESS NOTE ADULT - ASSESSMENT
73M HTN, ESRD (HD MWF via AVG) DM  Admit April29 with hiccups, Unstable angina, Developed AMS  Required intubation, ICU stay with CRRT, Acute ischemic CVA, DVT, GIB, Sepsis, trach and PEG placement, AVF dysfunction requiring balloon angioplasty   Tx to acute rehab 6/14/24    Ischemic CVA and toxic encephalopathy  -Concerns for baclofen toxicity thus held  -ASA/Statin  - NPO/Tube Feeds as below    Acute Hypoxemic, Hypercapnic Respiratory Failure, s/p ETT intubation  - s/p tracheostomy   - Albuterol neb q6h   - Continue TC w/ ATC  - Pulm consult    ESRD on HD MWF via RT AFF  SP AVF dysfunction requiring balloon angioplasty  Functioning fine RN  Nephro following    HTN  - Coreg 25 q12h  - Hydralazine 100 mg TID  - Norvasc 10 mg/D  - rest of BP control per nephro    DM  - A1c 6.9  - NPH 4u q6h  - FS, ISS TIDAC    R Common Iliac DVT  - CT AP (5/12) with nonocclusive RIGHT common iliac vein deep venous thrombosis  - Initially treated with heparin but then developed questionable GIB, transitioned to Eliquis   - IR was consulted, no plan for IVC filter  - Continue Eliquis 5 mg BID     #Oropharyngeal Dysphagia  - s/p PEG (5/17)  - Failed green dye 6/1   - Continue feeds: 10 cc/hr incr. q6 with goal 45cc/hr

## 2024-06-20 LAB
GLUCOSE BLDC GLUCOMTR-MCNC: 116 MG/DL — HIGH (ref 70–99)
GLUCOSE BLDC GLUCOMTR-MCNC: 149 MG/DL — HIGH (ref 70–99)
GLUCOSE BLDC GLUCOMTR-MCNC: 167 MG/DL — HIGH (ref 70–99)
GLUCOSE BLDC GLUCOMTR-MCNC: 243 MG/DL — HIGH (ref 70–99)

## 2024-06-20 PROCEDURE — 99232 SBSQ HOSP IP/OBS MODERATE 35: CPT

## 2024-06-20 PROCEDURE — 99232 SBSQ HOSP IP/OBS MODERATE 35: CPT | Mod: GC

## 2024-06-20 PROCEDURE — 74230 X-RAY XM SWLNG FUNCJ C+: CPT | Mod: 26

## 2024-06-20 RX ORDER — INSULIN GLARGINE 100 [IU]/ML
10 INJECTION, SOLUTION SUBCUTANEOUS
Refills: 0 | Status: DISCONTINUED | OUTPATIENT
Start: 2024-06-21 | End: 2024-06-23

## 2024-06-20 RX ORDER — INSULIN NPH HUMAN ISOPHANE 100/ML
4 VIAL (ML) SUBCUTANEOUS
Refills: 0 | Status: DISCONTINUED | OUTPATIENT
Start: 2024-06-20 | End: 2024-06-20

## 2024-06-20 RX ORDER — INSULIN GLARGINE 100 [IU]/ML
6 INJECTION, SOLUTION SUBCUTANEOUS ONCE
Refills: 0 | Status: COMPLETED | OUTPATIENT
Start: 2024-06-20 | End: 2024-06-20

## 2024-06-20 RX ADMIN — APIXABAN 5 MILLIGRAM(S): 5 TABLET, FILM COATED ORAL at 17:59

## 2024-06-20 RX ADMIN — APIXABAN 5 MILLIGRAM(S): 5 TABLET, FILM COATED ORAL at 06:46

## 2024-06-20 RX ADMIN — INSULIN GLARGINE 6 UNIT(S): 100 INJECTION, SOLUTION SUBCUTANEOUS at 12:24

## 2024-06-20 RX ADMIN — CARVEDILOL PHOSPHATE 25 MILLIGRAM(S): 80 CAPSULE, EXTENDED RELEASE ORAL at 06:47

## 2024-06-20 RX ADMIN — PANTOPRAZOLE SODIUM 40 MILLIGRAM(S): 40 INJECTION, POWDER, FOR SOLUTION INTRAVENOUS at 06:45

## 2024-06-20 RX ADMIN — MENTHOL, METHYL SALICYLATE 1 APPLICATION(S): 63; 210 PATCH PERCUTANEOUS; TOPICAL; TRANSDERMAL at 12:54

## 2024-06-20 RX ADMIN — Medication 2.5 MILLIGRAM(S): at 05:46

## 2024-06-20 RX ADMIN — AMLODIPINE BESYLATE 10 MILLIGRAM(S): 2.5 TABLET ORAL at 06:48

## 2024-06-20 RX ADMIN — Medication 2.5 MILLIGRAM(S): at 21:39

## 2024-06-20 RX ADMIN — INSULIN LISPRO 1: 100 INJECTION, SOLUTION SUBCUTANEOUS at 08:32

## 2024-06-20 RX ADMIN — LIDOCAINE HCL 1 PATCH: 28 GEL TOPICAL at 20:25

## 2024-06-20 RX ADMIN — HYDRALAZINE HYDROCHLORIDE 100 MILLIGRAM(S): 50 TABLET ORAL at 14:26

## 2024-06-20 RX ADMIN — Medication 2.5 MILLIGRAM(S): at 08:10

## 2024-06-20 RX ADMIN — Medication 650 MILLIGRAM(S): at 09:30

## 2024-06-20 RX ADMIN — Medication 2.5 MILLIGRAM(S): at 16:01

## 2024-06-20 RX ADMIN — Medication 650 MILLIGRAM(S): at 08:38

## 2024-06-20 RX ADMIN — ASPIRIN 81 MILLIGRAM(S): 325 TABLET, FILM COATED ORAL at 12:31

## 2024-06-20 RX ADMIN — INSULIN LISPRO 2: 100 INJECTION, SOLUTION SUBCUTANEOUS at 12:25

## 2024-06-20 RX ADMIN — Medication 4 UNIT(S): at 08:31

## 2024-06-20 RX ADMIN — LIDOCAINE HCL 1 PATCH: 28 GEL TOPICAL at 18:00

## 2024-06-20 RX ADMIN — MODAFINIL 50 MILLIGRAM(S): 100 TABLET ORAL at 12:30

## 2024-06-20 RX ADMIN — HYDRALAZINE HYDROCHLORIDE 100 MILLIGRAM(S): 50 TABLET ORAL at 06:46

## 2024-06-20 RX ADMIN — HYDRALAZINE HYDROCHLORIDE 100 MILLIGRAM(S): 50 TABLET ORAL at 21:39

## 2024-06-20 RX ADMIN — MENTHOL, METHYL SALICYLATE 1 APPLICATION(S): 63; 210 PATCH PERCUTANEOUS; TOPICAL; TRANSDERMAL at 07:36

## 2024-06-20 RX ADMIN — PANTOPRAZOLE SODIUM 40 MILLIGRAM(S): 40 INJECTION, POWDER, FOR SOLUTION INTRAVENOUS at 17:59

## 2024-06-20 RX ADMIN — MODAFINIL 50 MILLIGRAM(S): 100 TABLET ORAL at 06:44

## 2024-06-20 RX ADMIN — Medication 300 MILLIGRAM(S): at 12:25

## 2024-06-20 RX ADMIN — Medication 1 TABLET(S): at 12:26

## 2024-06-20 RX ADMIN — ATORVASTATIN CALCIUM 20 MILLIGRAM(S): 20 TABLET, FILM COATED ORAL at 21:39

## 2024-06-20 RX ADMIN — Medication 2 TABLET(S): at 21:39

## 2024-06-20 NOTE — PROGRESS NOTE ADULT - ASSESSMENT
73-year-old male w/ past medical history hypertension, ESRD (HD MWF)  insulin-dependent DM presented 4/29/24 to [facility] 4/29 with hiccups and demand ischemia, was treated with baclofen, developed what appears to be toxic encephalopathy. He then sustained a prolonged, complex hospital course over approx 6 weeks, notable for acute respiratory failure, intubation, sepsis, CRRT, DVT, GIB, trach and PEG placement, He is admitted for acute multidisciplinary rehab on 6/14/24 to [facility].     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  #Hospital Acquired Debility   #Dysphagia  #Gait, ADL, Functional impairments  - Comprehensive Multidisciplinary Rehab, PT/OT/ SLP 3 hr/day 5d/wk  - AC: Eliquis 5 BID for DVT, seen on CTA/P 5/12  - ASA81 qD  - Pain: PRN Tylenol, Lidocaine patch   - Absolute Avoidance of Baclofen   - NPO/Tube Feeds as below    #Acute Hypoxemic, Hypercapnic Respiratory Failure, s/p ETT intubation  #s/p tracheostomy   - Albuterol neb q6h   - Continue TC w/ ATC  - Reportedly tolerating PMV during daytime  - Respiratory to follow in house  Pulmonary consulted- Xray- PMV/ capping per speech  PMV during the day    #ESRD on HD  #Fistula stenosis   #Anemia   - Continue HD M/W/F per renal   - Access is RUE AVF  - Regular monitoring of electrolytes  - EPOGEN w/ HD  - Iron supplementation, 300 mg daily     #HTN  - Coreg 25 q12h  - Hydralazine 100 mg TID  - Norvasc 10 mg/D  - Isordil would be next agent added    #NSTEMI, Demand Ischemia, resolved  - Initially, troponins mildly elevated  - TTE (4/30): EF 72, normal, no regional wall motion abnormalities   - No need to pursue cath at this time   - 20 mg lipitor at bedtime     #IDDM2, A1c 6.9  - NPH 4u q6h  - FS, ISS TIDAC  hospialist/ diabetic nursing adjusting insulin requirements    #R Common Iliac DVT  - Initially treated with heparin but then developed questionable GIB, transitioned to Eliquis   - IR was consulted, no plan for IVC filter  - Continue Eliquis 5 mg BID     #GI  - Concern melena 5/27, resolved, transfused   - GI consulted, not candidate for endoscopy due to surgical risk  - Concern for upper GI Bleed, GI felt not true bleed  - Continue PPI BID   - MIralax 17g daily  - Senna 2 @ bedtime   - Ducolax suppository daily PRN     #Oropharyngeal Dysphagia  #s/p PEG (5/17)  - Failed green dye 6/1   - Continue feeds: Card Steady 10 cc/hr incr. q6 goal 45cc/hr - feeds changed to Bolus- tolerating no signs of aspiration  MBS today- cleared for Puree with mild thins      #Mood / Cognition:  - Neuropsych evaluation given prolonged and complex hospitalization  - Chart mentions concern for depression w/ possible suicidal ideation     #/Bladder:  - Check urinary status on arrival, possibly anuric   spont voiding    #Dysphagia:    - Diet: tube feeds/ Puree with Thin liquids  SP MBS  - Ongoing SLP assessment  - Nutrition to follow    #Skin/ Pressure Injury Prevention:  - Assessment on admission   - Incisions:    #Precautions/ Restrictions  - Falls, Aspiration Precautions   - COVID PCR:

## 2024-06-20 NOTE — PROGRESS NOTE ADULT - ASSESSMENT
Physical Examination:  GENERAL:               Alert,  No acute distress.    HEENT:                   No JVD, Dry MM + trach with no secretion   PULM:                     Bilateral air entry, diminished to auscultation bilaterally, no significant sputum production, No Rales, No Rhonchi, No Wheezing  CVS:                         S1, S2,  No Murmur  ABD:                        Soft, nondistended, nontender, normoactive bowel sounds, + PEG  EXT:                         No edema, nontender, No Cyanosis or Clubbing    NEURO:                  Alert,  interactive,  follows commands  PSYC:                      Calm,      Assessment  1. Chronic Respiratory failure s/p Trach  2. AMS appears to have improved , ? Baclofen toxicity vs CVA  3. ESRD on HD, DM 2,   4. Right iliac DVT on Eliquis      Plan  avoid frequent tracheal suctioning ; Oral suction ok  trach care  o2 Viat TC to keep humidity and sat > 92%  PMV trials while monitored with speech  PMV with therapies and day time hours appears to be tolerating   capping with speech and advance to therapies as able.    continue Eliquis for DVT    will follow

## 2024-06-20 NOTE — PROGRESS NOTE ADULT - SUBJECTIVE AND OBJECTIVE BOX
Patient is a 73y old  Male who presents with a chief complaint of CVA, Encephalopathy    Wants to go outside.....    Patient seen and examined at bedside.    ALLERGIES:  No Known Allergies    MEDICATIONS  (STANDING):  albuterol    0.083% 2.5 milliGRAM(s) Nebulizer every 6 hours  amLODIPine   Tablet 10 milliGRAM(s) Oral daily  apixaban 5 milliGRAM(s) Enteral Tube two times a day  aspirin  chewable 81 milliGRAM(s) Oral daily  atorvastatin 20 milliGRAM(s) Oral at bedtime  carvedilol 25 milliGRAM(s) Oral every 12 hours  chlorhexidine 2% Cloths 1 Application(s) Topical <User Schedule>  dextrose 10% Bolus 125 milliLiter(s) IV Bolus once  dextrose 5%. 1000 milliLiter(s) (50 mL/Hr) IV Continuous <Continuous>  dextrose 5%. 1000 milliLiter(s) (100 mL/Hr) IV Continuous <Continuous>  dextrose 50% Injectable 12.5 Gram(s) IV Push once  dextrose 50% Injectable 25 Gram(s) IV Push once  epoetin reilly-epbx (RETACRIT) Injectable 86259 Unit(s) IV Push <User Schedule>  ferrous    sulfate Liquid 300 milliGRAM(s) Enteral Tube daily  glucagon  Injectable 1 milliGRAM(s) IntraMuscular once  hydrALAZINE 100 milliGRAM(s) Oral three times a day  insulin lispro (ADMELOG) corrective regimen sliding scale   SubCutaneous Before meals and at bedtime  insulin NPH human recombinant 4 Unit(s) SubCutaneous every 6 hours  lidocaine   4% Patch 1 Patch Transdermal every 24 hours  lidocaine   4% Patch 1 Patch Transdermal every 24 hours  methyl salicylate 15%/menthol 10% Topical Cream 1 Application(s) Topical <User Schedule>  modafinil 50 milliGRAM(s) Oral <User Schedule>  Nephro-noam 1 Tablet(s) Oral daily  pantoprazole   Suspension 40 milliGRAM(s) Enteral Tube two times a day  polyethylene glycol 3350 17 Gram(s) Oral daily  senna 2 Tablet(s) Oral at bedtime    MEDICATIONS  (PRN):  acetaminophen   Oral Liquid .. 650 milliGRAM(s) Oral every 6 hours PRN Moderate Pain (4 - 6)  bisacodyl Suppository 10 milliGRAM(s) Rectal daily PRN Constipation  dextrose Oral Gel 15 Gram(s) Oral once PRN Blood Glucose LESS THAN 70 milliGRAM(s)/deciliter  melatonin 3 milliGRAM(s) Oral at bedtime PRN Insomnia    Vital Signs Last 24 Hrs  T(F): 98.7 (20 Jun 2024 07:37), Max: 98.7 (20 Jun 2024 07:37)  HR: 57 (20 Jun 2024 07:37) (54 - 68)  BP: 140/66 (20 Jun 2024 07:37) (126/58 - 164/66)  RR: 16 (20 Jun 2024 07:37) (16 - 16)  SpO2: 98% (20 Jun 2024 07:37) (98% - 100%)  I&O's Summary    19 Jun 2024 07:01  -  20 Jun 2024 07:00  --------------------------------------------------------  IN: 474 mL / OUT: 1500 mL / NET: -1026 mL        PHYSICAL EXAM:  General: NAD, A/O  Trach collar  PEG   ENT: no scleral icterus  Neck: Supple, No JVD, no thyroidomegaly  Lungs: Clear to auscultation bilaterally, no wheezes, no rales, no rhonchi, good inspiratory effort  Cardio: RRR, S1/S2, No murmurs  Abdomen: Soft, Nontender, Nondistended; Bowel sounds present  Extremities: No calf tenderness, No pitting edema, no skin changes    LABS:                        8.4    9.31  )-----------( 281      ( 19 Jun 2024 14:00 )             26.2       06-19    128  |  90  |  70  ----------------------------<  220  4.0   |  27  |  5.36    Ca    8.4      19 Jun 2024 14:00  Phos  3.2     06-19    TPro  6.9  /  Alb  2.6  /  TBili  0.4  /  DBili  x   /  AST  24  /  ALT  22  /  AlkPhos  275  06-17     05-17 Chol 86 mg/dL LDL -- HDL 27 mg/dL Trig 151 mg/dL    POCT Blood Glucose.: 167 mg/dL (20 Jun 2024 08:30)  POCT Blood Glucose.: 160 mg/dL (19 Jun 2024 21:08)  POCT Blood Glucose.: 89 mg/dL (19 Jun 2024 18:44)  POCT Blood Glucose.: 264 mg/dL (19 Jun 2024 12:06)      Urinalysis Basic - ( 19 Jun 2024 14:00 )    Color: x / Appearance: x / SG: x / pH: x  Gluc: 220 mg/dL / Ketone: x  / Bili: x / Urobili: x   Blood: x / Protein: x / Nitrite: x   Leuk Esterase: x / RBC: x / WBC x   Sq Epi: x / Non Sq Epi: x / Bacteria: x    COVID-19 PCR: NotDetec (06-14-24 @ 18:56)  COVID-19 PCR: NotDetec (06-14-24 @ 18:56)  COVID-19 PCR: NotDetec (01-19-24 @ 15:09)  COVID-19 PCR: NotDetec (08-31-22 @ 11:09)

## 2024-06-20 NOTE — PROGRESS NOTE ADULT - SUBJECTIVE AND OBJECTIVE BOX
Resting     Vital Signs Last 24 Hrs  T(C): 36.7 (06-20-24 @ 20:00), Max: 37.1 (06-20-24 @ 07:37)  T(F): 98.1 (06-20-24 @ 20:00), Max: 98.7 (06-20-24 @ 07:37)  HR: 53 (06-20-24 @ 21:29) (53 - 62)  BP: 155/60 (06-20-24 @ 21:29) (104/46 - 164/66)  RR: 15 (06-20-24 @ 20:00) (15 - 16)  SpO2: 98% (06-20-24 @ 20:00) (97% - 99%)    I&O's Detail    19 Jun 2024 07:01  -  20 Jun 2024 07:00  --------------------------------------------------------  IN:    Nepro: 474 mL  Total IN: 474 mL    OUT:    Other (mL): 1500 mL  Total OUT: 1500 mL    Lungs b/l air entry  Heart S1S2  Abd soft ND  Extremities: tr edema  RUE avf                                             8.4    9.31  )-----------( 281      ( 19 Jun 2024 14:00 )             26.2     19 Jun 2024 14:00    128    |  90     |  70     ----------------------------<  220    4.0     |  27     |  5.36     Ca    8.4        19 Jun 2024 14:00  Phos  3.2       19 Jun 2024 14:00    acetaminophen   Oral Liquid .. 650 milliGRAM(s) Oral every 6 hours PRN  albuterol    0.083% 2.5 milliGRAM(s) Nebulizer every 6 hours  amLODIPine   Tablet 10 milliGRAM(s) Oral daily  apixaban 5 milliGRAM(s) Enteral Tube two times a day  aspirin  chewable 81 milliGRAM(s) Oral daily  atorvastatin 20 milliGRAM(s) Oral at bedtime  bisacodyl Suppository 10 milliGRAM(s) Rectal daily PRN  carvedilol 25 milliGRAM(s) Oral every 12 hours  chlorhexidine 2% Cloths 1 Application(s) Topical <User Schedule>  dextrose 10% Bolus 125 milliLiter(s) IV Bolus once  dextrose 5%. 1000 milliLiter(s) IV Continuous <Continuous>  dextrose 5%. 1000 milliLiter(s) IV Continuous <Continuous>  dextrose 50% Injectable 12.5 Gram(s) IV Push once  dextrose 50% Injectable 25 Gram(s) IV Push once  dextrose Oral Gel 15 Gram(s) Oral once PRN  epoetin reilly-epbx (RETACRIT) Injectable 75404 Unit(s) IV Push <User Schedule>  ferrous    sulfate Liquid 300 milliGRAM(s) Enteral Tube daily  glucagon  Injectable 1 milliGRAM(s) IntraMuscular once  hydrALAZINE 100 milliGRAM(s) Oral three times a day  insulin glargine Injectable (LANTUS) 10 Unit(s) SubCutaneous <User Schedule>  insulin lispro (ADMELOG) corrective regimen sliding scale   SubCutaneous Before meals and at bedtime  lidocaine   4% Patch 1 Patch Transdermal every 24 hours  lidocaine   4% Patch 1 Patch Transdermal every 24 hours  melatonin 3 milliGRAM(s) Oral at bedtime PRN  methyl salicylate 15%/menthol 10% Topical Cream 1 Application(s) Topical <User Schedule>  modafinil 50 milliGRAM(s) Oral <User Schedule>  Nephro-noam 1 Tablet(s) Oral daily  pantoprazole   Suspension 40 milliGRAM(s) Enteral Tube two times a day  polyethylene glycol 3350 17 Gram(s) Oral daily  senna 2 Tablet(s) Oral at bedtime    A/P:    ESRD on HD  S/p complicated hospital course as per HPI  S/p CVA, trach, PEG  HD MWF  TMP 1 - 2kg w/HD as able  Epoetin w/HD 3 x week  Bld work w/each HD   Rehab    [phone]

## 2024-06-20 NOTE — PROGRESS NOTE ADULT - SUBJECTIVE AND OBJECTIVE BOX
Follow-up Pulmonary Progress Note  Chief Complaint : Cerebral infarction      patient seen and examined  no secretions, cough  tolerating PMV valve      Allergies :No Known Allergies      PAST MEDICAL & SURGICAL HISTORY:  Diabetes    Benign essential HTN    HLD (hyperlipidemia)    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    ESRD on hemodialysis    Arteriovenous fistula        Medications:  MEDICATIONS  (STANDING):  albuterol    0.083% 2.5 milliGRAM(s) Nebulizer every 6 hours  amLODIPine   Tablet 10 milliGRAM(s) Oral daily  apixaban 5 milliGRAM(s) Enteral Tube two times a day  aspirin  chewable 81 milliGRAM(s) Oral daily  atorvastatin 20 milliGRAM(s) Oral at bedtime  carvedilol 25 milliGRAM(s) Oral every 12 hours  chlorhexidine 2% Cloths 1 Application(s) Topical <User Schedule>  dextrose 10% Bolus 125 milliLiter(s) IV Bolus once  dextrose 5%. 1000 milliLiter(s) (50 mL/Hr) IV Continuous <Continuous>  dextrose 5%. 1000 milliLiter(s) (100 mL/Hr) IV Continuous <Continuous>  dextrose 50% Injectable 12.5 Gram(s) IV Push once  dextrose 50% Injectable 25 Gram(s) IV Push once  epoetin reilly-epbx (RETACRIT) Injectable 30154 Unit(s) IV Push <User Schedule>  ferrous    sulfate Liquid 300 milliGRAM(s) Enteral Tube daily  glucagon  Injectable 1 milliGRAM(s) IntraMuscular once  hydrALAZINE 100 milliGRAM(s) Oral three times a day  insulin glargine Injectable (LANTUS) 10 Unit(s) SubCutaneous <User Schedule>  insulin lispro (ADMELOG) corrective regimen sliding scale   SubCutaneous Before meals and at bedtime  lidocaine   4% Patch 1 Patch Transdermal every 24 hours  lidocaine   4% Patch 1 Patch Transdermal every 24 hours  methyl salicylate 15%/menthol 10% Topical Cream 1 Application(s) Topical <User Schedule>  modafinil 50 milliGRAM(s) Oral <User Schedule>  Nephro-noam 1 Tablet(s) Oral daily  pantoprazole   Suspension 40 milliGRAM(s) Enteral Tube two times a day  polyethylene glycol 3350 17 Gram(s) Oral daily  senna 2 Tablet(s) Oral at bedtime    MEDICATIONS  (PRN):  acetaminophen   Oral Liquid .. 650 milliGRAM(s) Oral every 6 hours PRN Moderate Pain (4 - 6)  bisacodyl Suppository 10 milliGRAM(s) Rectal daily PRN Constipation  dextrose Oral Gel 15 Gram(s) Oral once PRN Blood Glucose LESS THAN 70 milliGRAM(s)/deciliter  melatonin 3 milliGRAM(s) Oral at bedtime PRN Insomnia      Antibiotics History      Heme Medications   apixaban 5 milliGRAM(s) Enteral Tube two times a day, 06-14-24 @ 18:29  aspirin  chewable 81 milliGRAM(s) Oral daily, 06-15-24 @ 00:00      GI Medications  bisacodyl Suppository 10 milliGRAM(s) Rectal daily, 06-14-24 @ 18:30, Routine PRN  pantoprazole   Suspension 40 milliGRAM(s) Enteral Tube two times a day, 06-14-24 @ 21:14, Routine  polyethylene glycol 3350 17 Gram(s) Oral daily, 06-15-24 @ 00:00, Routine  senna 2 Tablet(s) Oral at bedtime, 06-14-24 @ 18:29, Routine        LABS:                        8.4    9.31  )-----------( 281      ( 19 Jun 2024 14:00 )             26.2     06-19    128<L>  |  90<L>  |  70<H>  ----------------------------<  220<H>  4.0   |  27  |  5.36<H>    Ca    8.4      19 Jun 2024 14:00  Phos  3.2     06-19                Urinalysis Basic - ( 19 Jun 2024 14:00 )    Color: x / Appearance: x / SG: x / pH: x  Gluc: 220 mg/dL / Ketone: x  / Bili: x / Urobili: x   Blood: x / Protein: x / Nitrite: x   Leuk Esterase: x / RBC: x / WBC x   Sq Epi: x / Non Sq Epi: x / Bacteria: x              CULTURES: (if applicable)          CAPILLARY BLOOD GLUCOSE      POCT Blood Glucose.: 243 mg/dL (20 Jun 2024 12:21)      RADIOLOGY  CXR:      CT:    ECHO:      VITALS:  T(C): 37.1 (06-20-24 @ 07:37), Max: 37.1 (06-20-24 @ 07:37)  T(F): 98.7 (06-20-24 @ 07:37), Max: 98.7 (06-20-24 @ 07:37)  HR: 57 (06-20-24 @ 08:57) (54 - 68)  BP: 104/46 (06-20-24 @ 08:57) (104/46 - 164/66)  BP(mean): --  ABP: --  ABP(mean): --  RR: 16 (06-20-24 @ 07:37) (16 - 16)  SpO2: 98% (06-20-24 @ 09:34) (97% - 100%)  CVP(mm Hg): --  CVP(cm H2O): --    Ins and Outs     06-19-24 @ 07:01  -  06-20-24 @ 07:00  --------------------------------------------------------  IN: 474 mL / OUT: 1500 mL / NET: -1026 mL                I&O's Detail    19 Jun 2024 07:01  -  20 Jun 2024 07:00  --------------------------------------------------------  IN:    Nepro: 474 mL  Total IN: 474 mL    OUT:    Other (mL): 1500 mL  Total OUT: 1500 mL    Total NET: -1026 mL

## 2024-06-20 NOTE — PROGRESS NOTE ADULT - SUBJECTIVE AND OBJECTIVE BOX
73-year-old male w/ past medical history hypertension, ESRD (HD MWF)  insulin-dependent DM presented 4/29/24 to [facility] with hiccups, chest pain of several days duration, admitted for concern demand ischemia. Early on, he was given baclofen for his hiccups, but soon developed AMS with 2 RRTs by 5/2/24, prompting intubation, upgrade to MICU. There was concern that baclofen toxicity was the culprit of his then-encephalopathic state, as patient had also missed a HD session due to a clotted fistula.     In coming days, he was treated empirically with 5 days of zosyn, had a negative CTH and LP, and had an EEG that did not capture a seizure but showed diffuse non-specific cerebral dysfunction - c/w toxic-metabolic encephalopathies. On 5/6, an MRI showed that the patient had sustained R pontine and cerebellar stroke. He was extubated on 5/9, but owing to a challenging extubation became septic w/ B Cereus, treated with Vanc. He had trach placed 5/14, PEG 5/17, then downgraded.     On the floors, he was followed by several services, including Neurology, who mention the strokes were embolic appearing. Subsequent hospital course notable for nonocclusive R common iliac DVT, for which he was started on a heparin drip which was stopped when patient developed an upper GI Bleed, though GI was consulted and felt he did not have an overt bleed. His dialysis fistula site was stenotic, s/p fistulogram, ultimately changed to a RUE AVF 6/5. He was ultimately started on Eliquis 6/7 for the DVT. Medically stable, he is admitted to [facility] on 6/14/24.     ROS/subjective:  patient seen and evaluated bedside  awake alert speaking with passy miur-  tolerating Provigil  less secretions - tolerating PMV  Knee pain better with arden sharma  Voiding, moving Bowels- last BM 6/18- incontinent  no dizziness, no headaches, no nausea, no vomiting, no SOB, no chest pain  MBS done today  ambulated 20' in parallel bars today        MEDICATIONS  (STANDING):  albuterol    0.083% 2.5 milliGRAM(s) Nebulizer every 6 hours  amLODIPine   Tablet 10 milliGRAM(s) Oral daily  apixaban 5 milliGRAM(s) Enteral Tube two times a day  aspirin  chewable 81 milliGRAM(s) Oral daily  atorvastatin 20 milliGRAM(s) Oral at bedtime  carvedilol 25 milliGRAM(s) Oral every 12 hours  chlorhexidine 2% Cloths 1 Application(s) Topical <User Schedule>  dextrose 10% Bolus 125 milliLiter(s) IV Bolus once  dextrose 5%. 1000 milliLiter(s) (50 mL/Hr) IV Continuous <Continuous>  dextrose 5%. 1000 milliLiter(s) (100 mL/Hr) IV Continuous <Continuous>  dextrose 50% Injectable 12.5 Gram(s) IV Push once  dextrose 50% Injectable 25 Gram(s) IV Push once  epoetin reilly-epbx (RETACRIT) Injectable 74119 Unit(s) IV Push <User Schedule>  ferrous    sulfate Liquid 300 milliGRAM(s) Enteral Tube daily  glucagon  Injectable 1 milliGRAM(s) IntraMuscular once  hydrALAZINE 100 milliGRAM(s) Oral three times a day  insulin glargine Injectable (LANTUS) 10 Unit(s) SubCutaneous <User Schedule>  insulin lispro (ADMELOG) corrective regimen sliding scale   SubCutaneous Before meals and at bedtime  lidocaine   4% Patch 1 Patch Transdermal every 24 hours  lidocaine   4% Patch 1 Patch Transdermal every 24 hours  methyl salicylate 15%/menthol 10% Topical Cream 1 Application(s) Topical <User Schedule>  modafinil 50 milliGRAM(s) Oral <User Schedule>  Nephro-noam 1 Tablet(s) Oral daily  pantoprazole   Suspension 40 milliGRAM(s) Enteral Tube two times a day  polyethylene glycol 3350 17 Gram(s) Oral daily  senna 2 Tablet(s) Oral at bedtime    MEDICATIONS  (PRN):  acetaminophen   Oral Liquid .. 650 milliGRAM(s) Oral every 6 hours PRN Moderate Pain (4 - 6)  bisacodyl Suppository 10 milliGRAM(s) Rectal daily PRN Constipation  dextrose Oral Gel 15 Gram(s) Oral once PRN Blood Glucose LESS THAN 70 milliGRAM(s)/deciliter  melatonin 3 milliGRAM(s) Oral at bedtime PRN Insomnia                            8.4    9.31  )-----------( 281      ( 19 Jun 2024 14:00 )             26.2     06-19    128<L>  |  90<L>  |  70<H>  ----------------------------<  220<H>  4.0   |  27  |  5.36<H>    Ca    8.4      19 Jun 2024 14:00  Phos  3.2     06-19      Urinalysis Basic - ( 19 Jun 2024 14:00 )    Color: x / Appearance: x / SG: x / pH: x  Gluc: 220 mg/dL / Ketone: x  / Bili: x / Urobili: x   Blood: x / Protein: x / Nitrite: x   Leuk Esterase: x / RBC: x / WBC x   Sq Epi: x / Non Sq Epi: x / Bacteria: x        CAPILLARY BLOOD GLUCOSE      POCT Blood Glucose.: 243 mg/dL (20 Jun 2024 12:21)  POCT Blood Glucose.: 167 mg/dL (20 Jun 2024 08:30)  POCT Blood Glucose.: 160 mg/dL (19 Jun 2024 21:08)  POCT Blood Glucose.: 89 mg/dL (19 Jun 2024 18:44)      Vital Signs Last 24 Hrs  T(C): 37.1 (20 Jun 2024 07:37), Max: 37.1 (20 Jun 2024 07:37)  T(F): 98.7 (20 Jun 2024 07:37), Max: 98.7 (20 Jun 2024 07:37)  HR: 57 (20 Jun 2024 08:57) (54 - 68)  BP: 104/46 (20 Jun 2024 08:57) (104/46 - 164/66)  BP(mean): --  RR: 16 (20 Jun 2024 07:37) (16 - 16)  SpO2: 98% (20 Jun 2024 09:34) (97% - 100%)    Parameters below as of 20 Jun 2024 08:57  Patient On (Oxygen Delivery Method): tracheostomy collar      Constitutional - NAD, Comfortable, cachectic  HEENT - NCAT,   Neck - Supple, No limited ROM  Chest - good chest expansion, good respiratory effort. + trach with PMV  Cardio - warm and well perfused,   Abdomen -  Soft, NTND. + peg.   Extremities - No peripheral edema, No calf tenderness. RUE fistula.   Neurologic Exam:                    Cognitive -       Verbal Oriented X3 3/3 recall     Speech - Fluent, Comprehensible, No dysarthria, No aphasia      Cranial Nerves - No facial asymmetry, Tongue midline, Shoulder shrug intact no facial droop     Motor -                      LEFT    UE - ShAB 4/5, EF 4/5, EE 3/5, WE 4/5,  WNL                    RIGHT UE - ShAB 4/5, EF 4/5, EE 3/5, WE 4/5,  WNL                    LEFT    LE - HF 3/5, KE 4/5, DF 4/5, PF 4/5 Flexes BLES today- no pain                    RIGHT LE - HF 3/5, KE 4/5, DF 4/5, PF 4/5        Sensory - Intact to LT bilateral in face, arm and legs.      Reflexes - DTR 1 / 4 biceps symmetric. neg Latham's b/l, No clonus.  Psychiatric - Mood stable, Affect WNL  Skin on admission: no sacral pressure injuries.      IDT in [facility] 6/19  Tentative DC 7/9

## 2024-06-21 LAB
ANION GAP SERPL CALC-SCNC: 8 MMOL/L — SIGNIFICANT CHANGE UP (ref 5–17)
BUN SERPL-MCNC: 53 MG/DL — HIGH (ref 7–23)
CALCIUM SERPL-MCNC: 8.5 MG/DL — SIGNIFICANT CHANGE UP (ref 8.4–10.5)
CHLORIDE SERPL-SCNC: 92 MMOL/L — LOW (ref 96–108)
CO2 SERPL-SCNC: 29 MMOL/L — SIGNIFICANT CHANGE UP (ref 22–31)
CREAT SERPL-MCNC: 4.9 MG/DL — HIGH (ref 0.5–1.3)
EGFR: 12 ML/MIN/1.73M2 — LOW
GLUCOSE BLDC GLUCOMTR-MCNC: 138 MG/DL — HIGH (ref 70–99)
GLUCOSE BLDC GLUCOMTR-MCNC: 139 MG/DL — HIGH (ref 70–99)
GLUCOSE BLDC GLUCOMTR-MCNC: 173 MG/DL — HIGH (ref 70–99)
GLUCOSE BLDC GLUCOMTR-MCNC: 89 MG/DL — SIGNIFICANT CHANGE UP (ref 70–99)
GLUCOSE SERPL-MCNC: 112 MG/DL — HIGH (ref 70–99)
HCT VFR BLD CALC: 27.4 % — LOW (ref 39–50)
HGB BLD-MCNC: 8.9 G/DL — LOW (ref 13–17)
MCHC RBC-ENTMCNC: 23.2 PG — LOW (ref 27–34)
MCHC RBC-ENTMCNC: 32.5 GM/DL — SIGNIFICANT CHANGE UP (ref 32–36)
MCV RBC AUTO: 71.4 FL — LOW (ref 80–100)
NRBC # BLD: 0 /100 WBCS — SIGNIFICANT CHANGE UP (ref 0–0)
PHOSPHATE SERPL-MCNC: 3.2 MG/DL — SIGNIFICANT CHANGE UP (ref 2.5–4.5)
PLATELET # BLD AUTO: 298 K/UL — SIGNIFICANT CHANGE UP (ref 150–400)
POTASSIUM SERPL-MCNC: 3.9 MMOL/L — SIGNIFICANT CHANGE UP (ref 3.5–5.3)
POTASSIUM SERPL-SCNC: 3.9 MMOL/L — SIGNIFICANT CHANGE UP (ref 3.5–5.3)
RBC # BLD: 3.84 M/UL — LOW (ref 4.2–5.8)
RBC # FLD: 22.4 % — HIGH (ref 10.3–14.5)
SODIUM SERPL-SCNC: 129 MMOL/L — LOW (ref 135–145)
WBC # BLD: 8.31 K/UL — SIGNIFICANT CHANGE UP (ref 3.8–10.5)
WBC # FLD AUTO: 8.31 K/UL — SIGNIFICANT CHANGE UP (ref 3.8–10.5)

## 2024-06-21 PROCEDURE — 99232 SBSQ HOSP IP/OBS MODERATE 35: CPT | Mod: GC

## 2024-06-21 PROCEDURE — 99233 SBSQ HOSP IP/OBS HIGH 50: CPT

## 2024-06-21 RX ADMIN — LIDOCAINE HCL 1 PATCH: 28 GEL TOPICAL at 06:01

## 2024-06-21 RX ADMIN — LIDOCAINE HCL 1 PATCH: 28 GEL TOPICAL at 18:30

## 2024-06-21 RX ADMIN — HYDRALAZINE HYDROCHLORIDE 100 MILLIGRAM(S): 50 TABLET ORAL at 21:18

## 2024-06-21 RX ADMIN — LIDOCAINE HCL 1 PATCH: 28 GEL TOPICAL at 20:29

## 2024-06-21 RX ADMIN — Medication 2.5 MILLIGRAM(S): at 08:07

## 2024-06-21 RX ADMIN — ERYTHROPOIETIN 10000 UNIT(S): 4000 INJECTION, SOLUTION INTRAVENOUS; SUBCUTANEOUS at 15:50

## 2024-06-21 RX ADMIN — INSULIN LISPRO 1: 100 INJECTION, SOLUTION SUBCUTANEOUS at 12:13

## 2024-06-21 RX ADMIN — MODAFINIL 50 MILLIGRAM(S): 100 TABLET ORAL at 06:41

## 2024-06-21 RX ADMIN — AMLODIPINE BESYLATE 10 MILLIGRAM(S): 2.5 TABLET ORAL at 06:41

## 2024-06-21 RX ADMIN — Medication 2 TABLET(S): at 21:19

## 2024-06-21 RX ADMIN — Medication 1 APPLICATION(S): at 06:43

## 2024-06-21 RX ADMIN — Medication 1 TABLET(S): at 12:06

## 2024-06-21 RX ADMIN — HYDRALAZINE HYDROCHLORIDE 100 MILLIGRAM(S): 50 TABLET ORAL at 06:41

## 2024-06-21 RX ADMIN — MENTHOL, METHYL SALICYLATE 1 APPLICATION(S): 63; 210 PATCH PERCUTANEOUS; TOPICAL; TRANSDERMAL at 06:43

## 2024-06-21 RX ADMIN — ATORVASTATIN CALCIUM 20 MILLIGRAM(S): 20 TABLET, FILM COATED ORAL at 21:18

## 2024-06-21 RX ADMIN — Medication 300 MILLIGRAM(S): at 12:05

## 2024-06-21 RX ADMIN — INSULIN GLARGINE 10 UNIT(S): 100 INJECTION, SOLUTION SUBCUTANEOUS at 08:04

## 2024-06-21 RX ADMIN — HYDRALAZINE HYDROCHLORIDE 100 MILLIGRAM(S): 50 TABLET ORAL at 18:29

## 2024-06-21 RX ADMIN — CARVEDILOL PHOSPHATE 25 MILLIGRAM(S): 80 CAPSULE, EXTENDED RELEASE ORAL at 18:30

## 2024-06-21 RX ADMIN — PANTOPRAZOLE SODIUM 40 MILLIGRAM(S): 40 INJECTION, POWDER, FOR SOLUTION INTRAVENOUS at 18:30

## 2024-06-21 RX ADMIN — APIXABAN 5 MILLIGRAM(S): 5 TABLET, FILM COATED ORAL at 18:30

## 2024-06-21 RX ADMIN — PANTOPRAZOLE SODIUM 40 MILLIGRAM(S): 40 INJECTION, POWDER, FOR SOLUTION INTRAVENOUS at 06:41

## 2024-06-21 RX ADMIN — MODAFINIL 50 MILLIGRAM(S): 100 TABLET ORAL at 12:08

## 2024-06-21 RX ADMIN — APIXABAN 5 MILLIGRAM(S): 5 TABLET, FILM COATED ORAL at 06:41

## 2024-06-21 RX ADMIN — ASPIRIN 81 MILLIGRAM(S): 325 TABLET, FILM COATED ORAL at 12:05

## 2024-06-21 RX ADMIN — MENTHOL, METHYL SALICYLATE 1 APPLICATION(S): 63; 210 PATCH PERCUTANEOUS; TOPICAL; TRANSDERMAL at 13:26

## 2024-06-21 RX ADMIN — Medication 2.5 MILLIGRAM(S): at 21:52

## 2024-06-21 NOTE — PROGRESS NOTE ADULT - ASSESSMENT
73-year-old male w/ past medical history hypertension, ESRD (HD MWF)  insulin-dependent DM presented 4/29/24 to [facility] 4/29 with hiccups and demand ischemia, was treated with baclofen, developed what appears to be toxic encephalopathy. He then sustained a prolonged, complex hospital course over approx 6 weeks, notable for acute respiratory failure, intubation, sepsis, CRRT, DVT, GIB, trach and PEG placement, He is admitted for acute multidisciplinary rehab on 6/14/24 to [facility].     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  #Hospital Acquired Debility   #Dysphagia  #Gait, ADL, Functional impairments  - Comprehensive Multidisciplinary Rehab, PT/OT/ SLP 3 hr/day 5d/wk  - AC: Eliquis 5 BID for DVT, seen on CTA/P 5/12  - ASA81 qD  - Pain: PRN Tylenol, Lidocaine patch   - Absolute Avoidance of Baclofen   - NPO/Tube Feeds as below    #Acute Hypoxemic, Hypercapnic Respiratory Failure, s/p ETT intubation  #s/p tracheostomy   - Albuterol neb q6h   - Continue TC w/ ATC  - Reportedly tolerating PMV during daytime  - Respiratory to follow in house  Pulmonary consulted- Xray- PMV/ capping per speech  PMV during the day    #ESRD on HD  #Fistula stenosis   #Anemia   - Continue HD M/W/F per renal   - Access is RUE AVF  - Regular monitoring of electrolytes  - EPOGEN w/ HD  - Iron supplementation, 300 mg daily   HD Today    #HTN  - Coreg 25 q12h  - Hydralazine 100 mg TID  - Norvasc 10 mg/D  - Isordil would be next agent added  BPs Stable    #NSTEMI, Demand Ischemia, resolved  - Initially, troponins mildly elevated  - TTE (4/30): EF 72, normal, no regional wall motion abnormalities   - No need to pursue cath at this time   - 20 mg lipitor at bedtime     #IDDM2, A1c 6.9  - NPH 4u q6h  - FS, ISS TIDAC  hospialist/ diabetic nursing adjusting insulin requirements    #R Common Iliac DVT  - Initially treated with heparin but then developed questionable GIB, transitioned to Eliquis   - IR was consulted, no plan for IVC filter  - Continue Eliquis 5 mg BID     #GI  - Concern melena 5/27, resolved, transfused   - GI consulted, not candidate for endoscopy due to surgical risk  - Concern for upper GI Bleed, GI felt not true bleed  - Continue PPI BID   - MIralax 17g daily  - Senna 2 @ bedtime   - Ducolax suppository daily PRN     #Oropharyngeal Dysphagia  #s/p PEG (5/17)  - Failed green dye 6/1   - Continue feeds: Card Steady 10 cc/hr incr. q6 goal 45cc/hr - feeds changed to Bolus- tolerating no signs of aspiration  MBS 6/20- cleared for Puree with mild thins  supplement PO with G feeds if needed      #Mood / Cognition:  - Neuropsych evaluation given prolonged and complex hospitalization  - Chart mentions concern for depression w/ possible suicidal ideation   tolerating Provigil    #/Bladder:  - Check urinary status on arrival, possibly anuric   spont voiding    #Dysphagia:    - Diet: tube feeds/ Puree with Thin liquids  SP MBS  - Ongoing SLP assessment  - Nutrition to follow    #Skin/ Pressure Injury Prevention:  - Assessment on admission   - Incisions:    #Precautions/ Restrictions  - Falls, Aspiration Precautions   - COVID PCR:

## 2024-06-21 NOTE — PROGRESS NOTE ADULT - SUBJECTIVE AND OBJECTIVE BOX
Seen on HD    Vital Signs Last 24 Hrs  T(C): 36.8 (06-21-24 @ 19:49), Max: 36.8 (06-21-24 @ 19:49)  T(F): 98.2 (06-21-24 @ 19:49), Max: 98.2 (06-21-24 @ 19:49)  HR: 57 (06-21-24 @ 19:49) (50 - 532)  BP: 126/71 (06-21-24 @ 19:49) (109/66 - 162/65)  RR: 15 (06-21-24 @ 19:49) (15 - 16)  SpO2: 99% (06-21-24 @ 19:49) (98% - 99%)    I&O's Detail    21 Jun 2024 07:01  -  21 Jun 2024 20:50  --------------------------------------------------------  OUT:    Other (mL): 1500 mL  Total OUT: 1500 mL    Lungs b/l air entry  Heart S1S2  Abd soft ND  Extremities: tr edema  RUE avf                                                    8.9    8.31  )-----------( 298      ( 21 Jun 2024 14:30 )             27.4     21 Jun 2024 14:30    129    |  92     |  53     ----------------------------<  112    3.9     |  29     |  4.90     Ca    8.5        21 Jun 2024 14:30  Phos  3.2       21 Jun 2024 14:30    acetaminophen   Oral Liquid .. 650 milliGRAM(s) Oral every 6 hours PRN  albuterol    0.083% 2.5 milliGRAM(s) Nebulizer every 6 hours  amLODIPine   Tablet 10 milliGRAM(s) Oral daily  apixaban 5 milliGRAM(s) Enteral Tube two times a day  aspirin  chewable 81 milliGRAM(s) Oral daily  atorvastatin 20 milliGRAM(s) Oral at bedtime  bisacodyl Suppository 10 milliGRAM(s) Rectal daily PRN  carvedilol 25 milliGRAM(s) Oral every 12 hours  chlorhexidine 2% Cloths 1 Application(s) Topical <User Schedule>  dextrose 10% Bolus 125 milliLiter(s) IV Bolus once  dextrose 5%. 1000 milliLiter(s) IV Continuous <Continuous>  dextrose 5%. 1000 milliLiter(s) IV Continuous <Continuous>  dextrose 50% Injectable 12.5 Gram(s) IV Push once  dextrose 50% Injectable 25 Gram(s) IV Push once  dextrose Oral Gel 15 Gram(s) Oral once PRN  epoetin reilly-epbx (RETACRIT) Injectable 73754 Unit(s) IV Push <User Schedule>  ferrous    sulfate Liquid 300 milliGRAM(s) Enteral Tube daily  glucagon  Injectable 1 milliGRAM(s) IntraMuscular once  hydrALAZINE 100 milliGRAM(s) Oral three times a day  insulin glargine Injectable (LANTUS) 10 Unit(s) SubCutaneous <User Schedule>  insulin lispro (ADMELOG) corrective regimen sliding scale   SubCutaneous Before meals and at bedtime  lidocaine   4% Patch 1 Patch Transdermal every 24 hours  lidocaine   4% Patch 1 Patch Transdermal every 24 hours  melatonin 3 milliGRAM(s) Oral at bedtime PRN  methyl salicylate 15%/menthol 10% Topical Cream 1 Application(s) Topical <User Schedule>  modafinil 50 milliGRAM(s) Oral <User Schedule>  Nephro-noam 1 Tablet(s) Oral daily  pantoprazole   Suspension 40 milliGRAM(s) Enteral Tube two times a day  polyethylene glycol 3350 17 Gram(s) Oral daily  senna 2 Tablet(s) Oral at bedtime    A/P:    ESRD on HD  S/p complicated hospital course as per HPI  S/p CVA, trach, PEG  HD MWF  TMP 1.5kg w/HD today  Epoetin w/HD 3 x week  Bld work w/each HD   Rehab    [phone]

## 2024-06-21 NOTE — PROGRESS NOTE ADULT - ASSESSMENT
73M HTN, ESRD (HD MWF via AVG) DM who was admitted for unstable angina further c/b altered mental status requiring intubation with CRRT requriment, acute CVA, DVT and GIB s/p PEG/trach. Notable course for AV Fisturla dysfunction requiring balloon angioplasty. Now in [place].         Ischemic CVA and toxic encephalopathy  -Concerns for baclofen toxicity thus held  -ASA/Statin  - NPO/Tube Feeds as below    Acute Hypoxemic, Hypercapnic Respiratory Failure, s/p ETT intubation  - s/p tracheostomy   - Albuterol neb q6h   - Continue TC w/ ATC  - Pulm consult    ESRD on HD MWF via RT AFF  SP AVF dysfunction requiring balloon angioplasty  Functioning fine RN  Nephro following    HTN  - Coreg 25 q12h  - Hydralazine 100 mg TID  - Norvasc 10 mg/D    DM  - A1c 6.9  -Will d/c NPH today  -Will give one time dose lantus 6units now  -Starting in AM will do lantus 10units q 8am  - C/w ISS, FS before bolus feeds    R Common Iliac DVT  - CT AP (5/12) with nonocclusive RIGHT common iliac vein deep venous thrombosis  - Initially treated with heparin but then developed questionable GIB, transitioned to Eliquis   - IR was consulted, no plan for IVC filter  - Continue Eliquis 5 mg BID     #Oropharyngeal Dysphagia  - s/p PEG (5/17)  - Failed green dye 6/1   - Continue feeds: 10 cc/hr incr. q6 with goal 45cc/hr     DVT PPX: Eliquis     73M HTN, ESRD (HD MWF via AVG) DM who was admitted for unstable angina further c/b altered mental status requiring intubation with CRRT requriment, acute CVA vs baclofen toxicity, DVT and GIB s/p PEG/trach. Notable course for AV Fistula dysfunction requiring balloon angioplasty. Now in AR.     labs from 6/19 reviewed    Ischemic CVA vs toxic encephalopathy  - continue holding baclofen  -ASA/Statin  - NPO/Tube Feeds as below    Acute Hypoxemic, Hypercapnic Respiratory Failure, s/p ETT intubation  - s/p tracheostomy   - Albuterol neb q6h   - Pulm reccs appreciated    ESRD on HD MWF via RT AFF  - stable, continue HD    HTN  - Coreg 25 q12h  - Hydralazine 100 mg TID  - Norvasc 10 mg/D    DM  - A1c 6.9  - POCT reviewed and stable  - lantus 10units q 8am  - C/w ISS, FS before bolus feeds    R Common Iliac DVT  - CT AP (5/12) with nonocclusive RIGHT common iliac vein deep venous thrombosis  - Continue Eliquis 5 mg BID     #Oropharyngeal Dysphagia  - s/p PEG (5/17)  - Failed green dye 6/1   - Continue TF    DVT PPX: Eliquis    discussed with primary team

## 2024-06-21 NOTE — PROGRESS NOTE ADULT - ASSESSMENT
Physical Examination:  GENERAL:               Alert,  No acute distress.    HEENT:                   No JVD, Dry MM + trach with no secretion   PULM:                     Bilateral air entry, diminished to auscultation bilaterally, no significant sputum production, No Rales, No Rhonchi, No Wheezing  CVS:                         S1, S2,  No Murmur  ABD:                        Soft, nondistended, nontender, normoactive bowel sounds, + PEG  EXT:                         No edema, nontender, No Cyanosis or Clubbing    NEURO:                  Alert,  interactive,  follows commands  PSYC:                      Calm,      Assessment  1. Chronic Respiratory failure s/p Trach  2. AMS appears to have improved , ? Baclofen toxicity vs CVA  3. ESRD on HD, DM 2,   4. Right iliac DVT on Eliquis      Plan  avoid frequent tracheal suctioning ; Oral suction ok  trach care  o2 Viat TC to keep humidity and sat > 92%  PMV trials while monitored with speech  PMV with therapies and day time hours appears to be tolerating   capping with speech and advance to therapies as able.    continue Eliquis for DVT  d/w bedside team and speech  will follow

## 2024-06-21 NOTE — PROGRESS NOTE ADULT - SUBJECTIVE AND OBJECTIVE BOX
73-year-old male w/ past medical history hypertension, ESRD (HD MWF)  insulin-dependent DM presented 4/29/24 to [facility] with hiccups, chest pain of several days duration, admitted for concern demand ischemia. Early on, he was given baclofen for his hiccups, but soon developed AMS with 2 RRTs by 5/2/24, prompting intubation, upgrade to MICU. There was concern that baclofen toxicity was the culprit of his then-encephalopathic state, as patient had also missed a HD session due to a clotted fistula.     In coming days, he was treated empirically with 5 days of zosyn, had a negative CTH and LP, and had an EEG that did not capture a seizure but showed diffuse non-specific cerebral dysfunction - c/w toxic-metabolic encephalopathies. On 5/6, an MRI showed that the patient had sustained R pontine and cerebellar stroke. He was extubated on 5/9, but owing to a challenging extubation became septic w/ B Cereus, treated with Vanc. He had trach placed 5/14, PEG 5/17, then downgraded.     On the floors, he was followed by several services, including Neurology, who mention the strokes were embolic appearing. Subsequent hospital course notable for nonocclusive R common iliac DVT, for which he was started on a heparin drip which was stopped when patient developed an upper GI Bleed, though GI was consulted and felt he did not have an overt bleed. His dialysis fistula site was stenotic, s/p fistulogram, ultimately changed to a RUE AVF 6/5. He was ultimately started on Eliquis 6/7 for the DVT. Medically stable, he is admitted to [facility] on 6/14/24.     ROS/subjective:  patient seen and evaluated bedside  ate 90% orally- Puree with thins -cleared by Speech with MBS  less verbal response with me today  seen by pulmonary- to cap trach with Speech  tolerating Provigil  less secretions - tolerating PMV  Knee pain better with arden sharma  Voiding, moving Bowels- last BM today 6/21  no dizziness, no headaches, no nausea, no vomiting, no SOB, no chest pain  HD today      MEDICATIONS  (STANDING):  albuterol    0.083% 2.5 milliGRAM(s) Nebulizer every 6 hours  amLODIPine   Tablet 10 milliGRAM(s) Oral daily  apixaban 5 milliGRAM(s) Enteral Tube two times a day  aspirin  chewable 81 milliGRAM(s) Oral daily  atorvastatin 20 milliGRAM(s) Oral at bedtime  carvedilol 25 milliGRAM(s) Oral every 12 hours  chlorhexidine 2% Cloths 1 Application(s) Topical <User Schedule>  dextrose 10% Bolus 125 milliLiter(s) IV Bolus once  dextrose 5%. 1000 milliLiter(s) (50 mL/Hr) IV Continuous <Continuous>  dextrose 5%. 1000 milliLiter(s) (100 mL/Hr) IV Continuous <Continuous>  dextrose 50% Injectable 12.5 Gram(s) IV Push once  dextrose 50% Injectable 25 Gram(s) IV Push once  epoetin reilly-epbx (RETACRIT) Injectable 76766 Unit(s) IV Push <User Schedule>  ferrous    sulfate Liquid 300 milliGRAM(s) Enteral Tube daily  glucagon  Injectable 1 milliGRAM(s) IntraMuscular once  hydrALAZINE 100 milliGRAM(s) Oral three times a day  insulin glargine Injectable (LANTUS) 10 Unit(s) SubCutaneous <User Schedule>  insulin lispro (ADMELOG) corrective regimen sliding scale   SubCutaneous Before meals and at bedtime  lidocaine   4% Patch 1 Patch Transdermal every 24 hours  lidocaine   4% Patch 1 Patch Transdermal every 24 hours  methyl salicylate 15%/menthol 10% Topical Cream 1 Application(s) Topical <User Schedule>  modafinil 50 milliGRAM(s) Oral <User Schedule>  Nephro-noam 1 Tablet(s) Oral daily  pantoprazole   Suspension 40 milliGRAM(s) Enteral Tube two times a day  polyethylene glycol 3350 17 Gram(s) Oral daily  senna 2 Tablet(s) Oral at bedtime    MEDICATIONS  (PRN):  acetaminophen   Oral Liquid .. 650 milliGRAM(s) Oral every 6 hours PRN Moderate Pain (4 - 6)  bisacodyl Suppository 10 milliGRAM(s) Rectal daily PRN Constipation  dextrose Oral Gel 15 Gram(s) Oral once PRN Blood Glucose LESS THAN 70 milliGRAM(s)/deciliter  melatonin 3 milliGRAM(s) Oral at bedtime PRN Insomnia                            8.4    9.31  )-----------( 281      ( 19 Jun 2024 14:00 )             26.2     06-19    128<L>  |  90<L>  |  70<H>  ----------------------------<  220<H>  4.0   |  27  |  5.36<H>    Ca    8.4      19 Jun 2024 14:00  Phos  3.2     06-19      Urinalysis Basic - ( 19 Jun 2024 14:00 )    Color: x / Appearance: x / SG: x / pH: x  Gluc: 220 mg/dL / Ketone: x  / Bili: x / Urobili: x   Blood: x / Protein: x / Nitrite: x   Leuk Esterase: x / RBC: x / WBC x   Sq Epi: x / Non Sq Epi: x / Bacteria: x        CAPILLARY BLOOD GLUCOSE      POCT Blood Glucose.: 173 mg/dL (21 Jun 2024 11:58)  POCT Blood Glucose.: 139 mg/dL (21 Jun 2024 07:43)  POCT Blood Glucose.: 116 mg/dL (20 Jun 2024 21:31)  POCT Blood Glucose.: 149 mg/dL (20 Jun 2024 17:15)  POCT Blood Glucose.: 243 mg/dL (20 Jun 2024 12:21)      Vital Signs Last 24 Hrs  T(C): 36.6 (21 Jun 2024 07:45), Max: 36.7 (20 Jun 2024 20:00)  T(F): 97.9 (21 Jun 2024 07:45), Max: 98.1 (20 Jun 2024 20:00)  HR: 57 (21 Jun 2024 07:45) (53 - 532)  BP: 138/53 (21 Jun 2024 07:45) (126/57 - 162/65)  BP(mean): --  RR: 16 (21 Jun 2024 07:45) (15 - 16)  SpO2: 98% (21 Jun 2024 10:02) (98% - 98%)    Parameters below as of 21 Jun 2024 09:41  Patient On (Oxygen Delivery Method): tracheostomy collar        Constitutional - NAD, Comfortable with PMV- in Bed  HEENT - NCAT,   Neck - Supple, No limited ROM  Chest - good chest expansion, good respiratory effort. + trach with PMV  Cardio - warm and well perfused,   Abdomen -  Soft, NTND. + peg.   Extremities - No peripheral edema, No calf tenderness. RUE fistula.   Neurologic Exam:                    Cognitive -       Verbal Oriented X3 3/3 recall     Speech - Fluent, Comprehensible, No dysarthria, No aphasia      Cranial Nerves - No facial asymmetry, Tongue midline, Shoulder shrug intact no facial droop     Motor -                      LEFT    UE - ShAB 4/5, EF 4/5, EE 3/5, WE 4/5,  WNL                    RIGHT UE - ShAB 4/5, EF 4/5, EE 3/5, WE 4/5,  WNL                    LEFT    LE - HF 3/5, KE 4/5, DF 4/5, PF 4/5 Flexes BLES today- no pain                    RIGHT LE - HF 3/5, KE 4/5, DF 4/5, PF 4/5        Sensory - Intact to LT bilateral in face, arm and legs.      Reflexes - DTR 1 / 4 biceps symmetric. neg Latham's b/l, No clonus.  Psychiatric - Mood stable, Affect WNL  Skin on admission: no sacral pressure injuries.      IDT in [place] 6/19  Tentative DC 7/9

## 2024-06-21 NOTE — PROGRESS NOTE ADULT - SUBJECTIVE AND OBJECTIVE BOX
no acute events overnight      PHYSICAL EXAM:    Vital Signs Last 24 Hrs  T(F): 97.9 (21 Jun 2024 07:45), Max: 98.1 (20 Jun 2024 20:00)  HR: 57 (21 Jun 2024 07:45) (53 - 532)  BP: 138/53 (21 Jun 2024 07:45) (126/57 - 162/65)  RR: 16 (21 Jun 2024 07:45) (15 - 16)  SpO2: 98% (21 Jun 2024 10:02) (98% - 98%)  I&O's Summary      GENERAL: NAD, somnolent but arousable  HEENT: NCAT, +trach  CHEST/LUNG: No increased WOB, Clear to percussion bilaterally; No rales, rhonchi, wheezing  HEART: Regular rate and rhythm; No murmurs  ABDOMEN: Soft, Nontender, Nondistended; Bowel sounds present  MUSCULOSKELETAL/EXTREMITIES:  2+ Peripheral Pulses, No LE edema  PSYCH: Appropriate affect, Alert & Oriented to person, further orientation evaluation limited    LABS:                        8.4    9.31  )-----------( 281      ( 19 Jun 2024 14:00 )             26.2       06-19    128  |  90  |  70  ----------------------------<  220  4.0   |  27  |  5.36    Ca    8.4      19 Jun 2024 14:00  Phos  3.2     06-19                  05-17 Chol 86 mg/dL LDL -- HDL 27 mg/dL Trig 151 mg/dL              POCT Blood Glucose.: 173 mg/dL (21 Jun 2024 11:58)  POCT Blood Glucose.: 139 mg/dL (21 Jun 2024 07:43)  POCT Blood Glucose.: 116 mg/dL (20 Jun 2024 21:31)  POCT Blood Glucose.: 149 mg/dL (20 Jun 2024 17:15)  POCT Blood Glucose.: 243 mg/dL (20 Jun 2024 12:21)      Urinalysis Basic - ( 19 Jun 2024 14:00 )    Color: x / Appearance: x / SG: x / pH: x  Gluc: 220 mg/dL / Ketone: x  / Bili: x / Urobili: x   Blood: x / Protein: x / Nitrite: x   Leuk Esterase: x / RBC: x / WBC x   Sq Epi: x / Non Sq Epi: x / Bacteria: x        COVID-19 PCR: NotDetec (06-14-24 @ 18:56)      MEDICATIONS  (STANDING):  albuterol    0.083% 2.5 milliGRAM(s) Nebulizer every 6 hours  amLODIPine   Tablet 10 milliGRAM(s) Oral daily  apixaban 5 milliGRAM(s) Enteral Tube two times a day  aspirin  chewable 81 milliGRAM(s) Oral daily  atorvastatin 20 milliGRAM(s) Oral at bedtime  carvedilol 25 milliGRAM(s) Oral every 12 hours  chlorhexidine 2% Cloths 1 Application(s) Topical <User Schedule>  dextrose 10% Bolus 125 milliLiter(s) IV Bolus once  dextrose 5%. 1000 milliLiter(s) (50 mL/Hr) IV Continuous <Continuous>  dextrose 5%. 1000 milliLiter(s) (100 mL/Hr) IV Continuous <Continuous>  dextrose 50% Injectable 25 Gram(s) IV Push once  dextrose 50% Injectable 12.5 Gram(s) IV Push once  epoetin reilly-epbx (RETACRIT) Injectable 71334 Unit(s) IV Push <User Schedule>  ferrous    sulfate Liquid 300 milliGRAM(s) Enteral Tube daily  glucagon  Injectable 1 milliGRAM(s) IntraMuscular once  hydrALAZINE 100 milliGRAM(s) Oral three times a day  insulin glargine Injectable (LANTUS) 10 Unit(s) SubCutaneous <User Schedule>  insulin lispro (ADMELOG) corrective regimen sliding scale   SubCutaneous Before meals and at bedtime  lidocaine   4% Patch 1 Patch Transdermal every 24 hours  lidocaine   4% Patch 1 Patch Transdermal every 24 hours  methyl salicylate 15%/menthol 10% Topical Cream 1 Application(s) Topical <User Schedule>  modafinil 50 milliGRAM(s) Oral <User Schedule>  Nephro-noam 1 Tablet(s) Oral daily  pantoprazole   Suspension 40 milliGRAM(s) Enteral Tube two times a day  polyethylene glycol 3350 17 Gram(s) Oral daily  senna 2 Tablet(s) Oral at bedtime    MEDICATIONS  (PRN):  acetaminophen   Oral Liquid .. 650 milliGRAM(s) Oral every 6 hours PRN Moderate Pain (4 - 6)  bisacodyl Suppository 10 milliGRAM(s) Rectal daily PRN Constipation  dextrose Oral Gel 15 Gram(s) Oral once PRN Blood Glucose LESS THAN 70 milliGRAM(s)/deciliter  melatonin 3 milliGRAM(s) Oral at bedtime PRN Insomnia      Care Discussed with Consultants/Other Providers: Yes

## 2024-06-21 NOTE — PROGRESS NOTE ADULT - SUBJECTIVE AND OBJECTIVE BOX
Follow-up Pulmonary Progress Note  Chief Complaint : Cerebral infarction    patient seen and examined  comfortable  has oral secretions mild when seen clear  no tracheal secretions noted      Allergies :No Known Allergies      PAST MEDICAL & SURGICAL HISTORY:  Diabetes    Benign essential HTN    HLD (hyperlipidemia)    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    ESRD on hemodialysis    Arteriovenous fistula        Medications:  MEDICATIONS  (STANDING):  albuterol    0.083% 2.5 milliGRAM(s) Nebulizer every 6 hours  amLODIPine   Tablet 10 milliGRAM(s) Oral daily  apixaban 5 milliGRAM(s) Enteral Tube two times a day  aspirin  chewable 81 milliGRAM(s) Oral daily  atorvastatin 20 milliGRAM(s) Oral at bedtime  carvedilol 25 milliGRAM(s) Oral every 12 hours  chlorhexidine 2% Cloths 1 Application(s) Topical <User Schedule>  dextrose 10% Bolus 125 milliLiter(s) IV Bolus once  dextrose 5%. 1000 milliLiter(s) (50 mL/Hr) IV Continuous <Continuous>  dextrose 5%. 1000 milliLiter(s) (100 mL/Hr) IV Continuous <Continuous>  dextrose 50% Injectable 12.5 Gram(s) IV Push once  dextrose 50% Injectable 25 Gram(s) IV Push once  epoetin reilly-epbx (RETACRIT) Injectable 22214 Unit(s) IV Push <User Schedule>  ferrous    sulfate Liquid 300 milliGRAM(s) Enteral Tube daily  glucagon  Injectable 1 milliGRAM(s) IntraMuscular once  hydrALAZINE 100 milliGRAM(s) Oral three times a day  insulin glargine Injectable (LANTUS) 10 Unit(s) SubCutaneous <User Schedule>  insulin lispro (ADMELOG) corrective regimen sliding scale   SubCutaneous Before meals and at bedtime  lidocaine   4% Patch 1 Patch Transdermal every 24 hours  lidocaine   4% Patch 1 Patch Transdermal every 24 hours  methyl salicylate 15%/menthol 10% Topical Cream 1 Application(s) Topical <User Schedule>  modafinil 50 milliGRAM(s) Oral <User Schedule>  Nephro-noam 1 Tablet(s) Oral daily  pantoprazole   Suspension 40 milliGRAM(s) Enteral Tube two times a day  polyethylene glycol 3350 17 Gram(s) Oral daily  senna 2 Tablet(s) Oral at bedtime    MEDICATIONS  (PRN):  acetaminophen   Oral Liquid .. 650 milliGRAM(s) Oral every 6 hours PRN Moderate Pain (4 - 6)  bisacodyl Suppository 10 milliGRAM(s) Rectal daily PRN Constipation  dextrose Oral Gel 15 Gram(s) Oral once PRN Blood Glucose LESS THAN 70 milliGRAM(s)/deciliter  melatonin 3 milliGRAM(s) Oral at bedtime PRN Insomnia      Antibiotics History      Heme Medications   apixaban 5 milliGRAM(s) Enteral Tube two times a day, 06-14-24 @ 18:29  aspirin  chewable 81 milliGRAM(s) Oral daily, 06-15-24 @ 00:00      GI Medications  bisacodyl Suppository 10 milliGRAM(s) Rectal daily, 06-14-24 @ 18:30, Routine PRN  pantoprazole   Suspension 40 milliGRAM(s) Enteral Tube two times a day, 06-14-24 @ 21:14, Routine  polyethylene glycol 3350 17 Gram(s) Oral daily, 06-15-24 @ 00:00, Routine  senna 2 Tablet(s) Oral at bedtime, 06-14-24 @ 18:29, Routine        LABS:                        8.4    9.31  )-----------( 281      ( 19 Jun 2024 14:00 )             26.2     06-19    128<L>  |  90<L>  |  70<H>  ----------------------------<  220<H>  4.0   |  27  |  5.36<H>    Ca    8.4      19 Jun 2024 14:00  Phos  3.2     06-19             VITALS:  T(C): 36.6 (06-21-24 @ 07:45), Max: 36.7 (06-20-24 @ 20:00)  T(F): 97.9 (06-21-24 @ 07:45), Max: 98.1 (06-20-24 @ 20:00)  HR: 57 (06-21-24 @ 07:45) (53 - 532)  BP: 138/53 (06-21-24 @ 07:45) (126/57 - 162/65)  BP(mean): --  ABP: --  ABP(mean): --  RR: 16 (06-21-24 @ 07:45) (15 - 16)  SpO2: 98% (06-21-24 @ 10:02) (98% - 98%)  CVP(mm Hg): --  CVP(cm H2O): --    Ins and Outs

## 2024-06-22 LAB
GLUCOSE BLDC GLUCOMTR-MCNC: 101 MG/DL — HIGH (ref 70–99)
GLUCOSE BLDC GLUCOMTR-MCNC: 104 MG/DL — HIGH (ref 70–99)
GLUCOSE BLDC GLUCOMTR-MCNC: 154 MG/DL — HIGH (ref 70–99)
GLUCOSE BLDC GLUCOMTR-MCNC: 96 MG/DL — SIGNIFICANT CHANGE UP (ref 70–99)

## 2024-06-22 PROCEDURE — 99232 SBSQ HOSP IP/OBS MODERATE 35: CPT

## 2024-06-22 RX ADMIN — LIDOCAINE HCL 1 PATCH: 28 GEL TOPICAL at 06:43

## 2024-06-22 RX ADMIN — Medication 2.5 MILLIGRAM(S): at 15:43

## 2024-06-22 RX ADMIN — LIDOCAINE HCL 1 PATCH: 28 GEL TOPICAL at 20:04

## 2024-06-22 RX ADMIN — Medication 2.5 MILLIGRAM(S): at 21:33

## 2024-06-22 RX ADMIN — HYDRALAZINE HYDROCHLORIDE 100 MILLIGRAM(S): 50 TABLET ORAL at 06:48

## 2024-06-22 RX ADMIN — Medication 300 MILLIGRAM(S): at 12:33

## 2024-06-22 RX ADMIN — Medication 2.5 MILLIGRAM(S): at 08:02

## 2024-06-22 RX ADMIN — ASPIRIN 81 MILLIGRAM(S): 325 TABLET, FILM COATED ORAL at 12:33

## 2024-06-22 RX ADMIN — LIDOCAINE HCL 1 PATCH: 28 GEL TOPICAL at 17:32

## 2024-06-22 RX ADMIN — INSULIN GLARGINE 10 UNIT(S): 100 INJECTION, SOLUTION SUBCUTANEOUS at 07:48

## 2024-06-22 RX ADMIN — HYDRALAZINE HYDROCHLORIDE 100 MILLIGRAM(S): 50 TABLET ORAL at 14:49

## 2024-06-22 RX ADMIN — MODAFINIL 50 MILLIGRAM(S): 100 TABLET ORAL at 12:32

## 2024-06-22 RX ADMIN — PANTOPRAZOLE SODIUM 40 MILLIGRAM(S): 40 INJECTION, POWDER, FOR SOLUTION INTRAVENOUS at 17:32

## 2024-06-22 RX ADMIN — HYDRALAZINE HYDROCHLORIDE 100 MILLIGRAM(S): 50 TABLET ORAL at 21:59

## 2024-06-22 RX ADMIN — CARVEDILOL PHOSPHATE 25 MILLIGRAM(S): 80 CAPSULE, EXTENDED RELEASE ORAL at 17:36

## 2024-06-22 RX ADMIN — APIXABAN 5 MILLIGRAM(S): 5 TABLET, FILM COATED ORAL at 17:31

## 2024-06-22 RX ADMIN — AMLODIPINE BESYLATE 10 MILLIGRAM(S): 2.5 TABLET ORAL at 06:48

## 2024-06-22 RX ADMIN — Medication 2 TABLET(S): at 21:59

## 2024-06-22 RX ADMIN — MENTHOL, METHYL SALICYLATE 1 APPLICATION(S): 63; 210 PATCH PERCUTANEOUS; TOPICAL; TRANSDERMAL at 12:43

## 2024-06-22 RX ADMIN — APIXABAN 5 MILLIGRAM(S): 5 TABLET, FILM COATED ORAL at 06:48

## 2024-06-22 RX ADMIN — Medication 1 TABLET(S): at 12:33

## 2024-06-22 RX ADMIN — Medication 1 APPLICATION(S): at 06:50

## 2024-06-22 RX ADMIN — PANTOPRAZOLE SODIUM 40 MILLIGRAM(S): 40 INJECTION, POWDER, FOR SOLUTION INTRAVENOUS at 06:48

## 2024-06-22 RX ADMIN — MODAFINIL 50 MILLIGRAM(S): 100 TABLET ORAL at 06:48

## 2024-06-22 RX ADMIN — INSULIN LISPRO 1: 100 INJECTION, SOLUTION SUBCUTANEOUS at 21:58

## 2024-06-22 RX ADMIN — POLYETHYLENE GLYCOL 3350 17 GRAM(S): 1 POWDER ORAL at 12:34

## 2024-06-22 RX ADMIN — ATORVASTATIN CALCIUM 20 MILLIGRAM(S): 20 TABLET, FILM COATED ORAL at 21:59

## 2024-06-22 NOTE — PROGRESS NOTE ADULT - SUBJECTIVE AND OBJECTIVE BOX
no acute events overnight      PHYSICAL EXAM:    Vital Signs Last 24 Hrs  T(F): 98.6 (22 Jun 2024 07:30), Max: 98.6 (22 Jun 2024 07:30)  HR: 58 (22 Jun 2024 07:30) (50 - 63)  BP: 141/50 (22 Jun 2024 07:30) (109/66 - 150/72)  RR: 16 (22 Jun 2024 07:30) (15 - 16)  SpO2: 97% (22 Jun 2024 07:30) (97% - 99%)  I&O's Summary    21 Jun 2024 07:01  -  22 Jun 2024 07:00  --------------------------------------------------------  IN: 0 mL / OUT: 1500 mL / NET: -1500 mL        GENERAL: NAD  HEENT: NCAT, +trach  CHEST/LUNG: No increased WOB, Clear to percussion bilaterally; No rales, rhonchi, wheezing  HEART: Regular rate and rhythm; No murmurs  ABDOMEN: Soft, Nontender, Nondistended; Bowel sounds present  MUSCULOSKELETAL/EXTREMITIES:  2+ Peripheral Pulses, No LE edema  PSYCH: Appropriate affect, Alert & Oriented    LABS:                        8.9    8.31  )-----------( 298      ( 21 Jun 2024 14:30 )             27.4       06-21    129  |  92  |  53  ----------------------------<  112  3.9   |  29  |  4.90    Ca    8.5      21 Jun 2024 14:30  Phos  3.2     06-21                  05-17 Chol 86 mg/dL LDL -- HDL 27 mg/dL Trig 151 mg/dL              POCT Blood Glucose.: 101 mg/dL (22 Jun 2024 07:26)  POCT Blood Glucose.: 138 mg/dL (21 Jun 2024 21:17)  POCT Blood Glucose.: 89 mg/dL (21 Jun 2024 18:18)  POCT Blood Glucose.: 173 mg/dL (21 Jun 2024 11:58)      Urinalysis Basic - ( 21 Jun 2024 14:30 )    Color: x / Appearance: x / SG: x / pH: x  Gluc: 112 mg/dL / Ketone: x  / Bili: x / Urobili: x   Blood: x / Protein: x / Nitrite: x   Leuk Esterase: x / RBC: x / WBC x   Sq Epi: x / Non Sq Epi: x / Bacteria: x        COVID-19 PCR: NotDetec (06-14-24 @ 18:56)      MEDICATIONS  (STANDING):  albuterol    0.083% 2.5 milliGRAM(s) Nebulizer every 6 hours  amLODIPine   Tablet 10 milliGRAM(s) Oral daily  apixaban 5 milliGRAM(s) Enteral Tube two times a day  aspirin  chewable 81 milliGRAM(s) Oral daily  atorvastatin 20 milliGRAM(s) Oral at bedtime  carvedilol 25 milliGRAM(s) Oral every 12 hours  chlorhexidine 2% Cloths 1 Application(s) Topical <User Schedule>  dextrose 10% Bolus 125 milliLiter(s) IV Bolus once  dextrose 5%. 1000 milliLiter(s) (100 mL/Hr) IV Continuous <Continuous>  dextrose 5%. 1000 milliLiter(s) (50 mL/Hr) IV Continuous <Continuous>  dextrose 50% Injectable 25 Gram(s) IV Push once  dextrose 50% Injectable 12.5 Gram(s) IV Push once  epoetin reilly-epbx (RETACRIT) Injectable 03874 Unit(s) IV Push <User Schedule>  ferrous    sulfate Liquid 300 milliGRAM(s) Enteral Tube daily  glucagon  Injectable 1 milliGRAM(s) IntraMuscular once  hydrALAZINE 100 milliGRAM(s) Oral three times a day  insulin glargine Injectable (LANTUS) 10 Unit(s) SubCutaneous <User Schedule>  insulin lispro (ADMELOG) corrective regimen sliding scale   SubCutaneous Before meals and at bedtime  lidocaine   4% Patch 1 Patch Transdermal every 24 hours  lidocaine   4% Patch 1 Patch Transdermal every 24 hours  methyl salicylate 15%/menthol 10% Topical Cream 1 Application(s) Topical <User Schedule>  modafinil 50 milliGRAM(s) Oral <User Schedule>  Nephro-noam 1 Tablet(s) Oral daily  pantoprazole   Suspension 40 milliGRAM(s) Enteral Tube two times a day  polyethylene glycol 3350 17 Gram(s) Oral daily  senna 2 Tablet(s) Oral at bedtime    MEDICATIONS  (PRN):  acetaminophen   Oral Liquid .. 650 milliGRAM(s) Oral every 6 hours PRN Moderate Pain (4 - 6)  bisacodyl Suppository 10 milliGRAM(s) Rectal daily PRN Constipation  dextrose Oral Gel 15 Gram(s) Oral once PRN Blood Glucose LESS THAN 70 milliGRAM(s)/deciliter  melatonin 3 milliGRAM(s) Oral at bedtime PRN Insomnia      Care Discussed with Consultants/Other Providers: Yes

## 2024-06-22 NOTE — PROGRESS NOTE ADULT - SUBJECTIVE AND OBJECTIVE BOX
No distress    Vital Signs Last 24 Hrs  T(C): 36.6 (06-22-24 @ 19:58), Max: 37 (06-22-24 @ 07:30)  T(F): 97.9 (06-22-24 @ 19:58), Max: 98.6 (06-22-24 @ 07:30)  HR: 55 (06-22-24 @ 21:55) (53 - 99)  BP: 169/67 (06-22-24 @ 21:55) (130/71 - 169/67)  RR: 15 (06-22-24 @ 19:58) (15 - 16)  SpO2: 98% (06-22-24 @ 19:58) (97% - 98%)    I&O's Detail    21 Jun 2024 07:01  -  22 Jun 2024 07:00  --------------------------------------------------------  OUT:    Other (mL): 1500 mL  Total OUT: 1500 mL    Lungs b/l air entry  Heart S1S2  Abd soft ND  Extremities: tr edema  RUE avf                                                            8.9    8.31  )-----------( 298      ( 21 Jun 2024 14:30 )             27.4     21 Jun 2024 14:30    129    |  92     |  53     ----------------------------<  112    3.9     |  29     |  4.90     Ca    8.5        21 Jun 2024 14:30  Phos  3.2       21 Jun 2024 14:30    acetaminophen   Oral Liquid .. 650 milliGRAM(s) Oral every 6 hours PRN  albuterol    0.083% 2.5 milliGRAM(s) Nebulizer every 6 hours  amLODIPine   Tablet 10 milliGRAM(s) Oral daily  apixaban 5 milliGRAM(s) Enteral Tube two times a day  aspirin  chewable 81 milliGRAM(s) Oral daily  atorvastatin 20 milliGRAM(s) Oral at bedtime  bisacodyl Suppository 10 milliGRAM(s) Rectal daily PRN  carvedilol 25 milliGRAM(s) Oral every 12 hours  chlorhexidine 2% Cloths 1 Application(s) Topical <User Schedule>  dextrose 10% Bolus 125 milliLiter(s) IV Bolus once  dextrose 5%. 1000 milliLiter(s) IV Continuous <Continuous>  dextrose 5%. 1000 milliLiter(s) IV Continuous <Continuous>  dextrose 50% Injectable 12.5 Gram(s) IV Push once  dextrose 50% Injectable 25 Gram(s) IV Push once  dextrose Oral Gel 15 Gram(s) Oral once PRN  epoetin reilly-epbx (RETACRIT) Injectable 96490 Unit(s) IV Push <User Schedule>  ferrous    sulfate Liquid 300 milliGRAM(s) Enteral Tube daily  glucagon  Injectable 1 milliGRAM(s) IntraMuscular once  hydrALAZINE 100 milliGRAM(s) Oral three times a day  insulin glargine Injectable (LANTUS) 10 Unit(s) SubCutaneous <User Schedule>  insulin lispro (ADMELOG) corrective regimen sliding scale   SubCutaneous Before meals and at bedtime  lidocaine   4% Patch 1 Patch Transdermal every 24 hours  lidocaine   4% Patch 1 Patch Transdermal every 24 hours  melatonin 3 milliGRAM(s) Oral at bedtime PRN  methyl salicylate 15%/menthol 10% Topical Cream 1 Application(s) Topical <User Schedule>  modafinil 50 milliGRAM(s) Oral <User Schedule>  Nephro-noam 1 Tablet(s) Oral daily  pantoprazole   Suspension 40 milliGRAM(s) Enteral Tube two times a day  polyethylene glycol 3350 17 Gram(s) Oral daily  senna 2 Tablet(s) Oral at bedtime    A/P:    ESRD on HD  S/p complicated hospital course as per HPI  S/p CVA, trach, PEG  HD MWF  TMP 1.5kg w/HD yesterday  Epoetin w/HD 3 x week  Bld work w/each HD   Rehab    [phone]

## 2024-06-22 NOTE — PROGRESS NOTE ADULT - SUBJECTIVE AND OBJECTIVE BOX
Follow-up Pulmonary Progress Note  Chief Complaint : Cerebral infarction    patient seen and examined  tolerating PMV        Allergies :No Known Allergies      PAST MEDICAL & SURGICAL HISTORY:  Diabetes    Benign essential HTN    HLD (hyperlipidemia)    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    ESRD on hemodialysis    Arteriovenous fistula        Medications:  MEDICATIONS  (STANDING):  albuterol    0.083% 2.5 milliGRAM(s) Nebulizer every 6 hours  amLODIPine   Tablet 10 milliGRAM(s) Oral daily  apixaban 5 milliGRAM(s) Enteral Tube two times a day  aspirin  chewable 81 milliGRAM(s) Oral daily  atorvastatin 20 milliGRAM(s) Oral at bedtime  carvedilol 25 milliGRAM(s) Oral every 12 hours  chlorhexidine 2% Cloths 1 Application(s) Topical <User Schedule>  dextrose 10% Bolus 125 milliLiter(s) IV Bolus once  dextrose 5%. 1000 milliLiter(s) (50 mL/Hr) IV Continuous <Continuous>  dextrose 5%. 1000 milliLiter(s) (100 mL/Hr) IV Continuous <Continuous>  dextrose 50% Injectable 25 Gram(s) IV Push once  dextrose 50% Injectable 12.5 Gram(s) IV Push once  epoetin reilly-epbx (RETACRIT) Injectable 43228 Unit(s) IV Push <User Schedule>  ferrous    sulfate Liquid 300 milliGRAM(s) Enteral Tube daily  glucagon  Injectable 1 milliGRAM(s) IntraMuscular once  hydrALAZINE 100 milliGRAM(s) Oral three times a day  insulin glargine Injectable (LANTUS) 10 Unit(s) SubCutaneous <User Schedule>  insulin lispro (ADMELOG) corrective regimen sliding scale   SubCutaneous Before meals and at bedtime  lidocaine   4% Patch 1 Patch Transdermal every 24 hours  lidocaine   4% Patch 1 Patch Transdermal every 24 hours  methyl salicylate 15%/menthol 10% Topical Cream 1 Application(s) Topical <User Schedule>  modafinil 50 milliGRAM(s) Oral <User Schedule>  Nephro-noam 1 Tablet(s) Oral daily  pantoprazole   Suspension 40 milliGRAM(s) Enteral Tube two times a day  polyethylene glycol 3350 17 Gram(s) Oral daily  senna 2 Tablet(s) Oral at bedtime    MEDICATIONS  (PRN):  acetaminophen   Oral Liquid .. 650 milliGRAM(s) Oral every 6 hours PRN Moderate Pain (4 - 6)  bisacodyl Suppository 10 milliGRAM(s) Rectal daily PRN Constipation  dextrose Oral Gel 15 Gram(s) Oral once PRN Blood Glucose LESS THAN 70 milliGRAM(s)/deciliter  melatonin 3 milliGRAM(s) Oral at bedtime PRN Insomnia      Antibiotics History      Heme Medications   apixaban 5 milliGRAM(s) Enteral Tube two times a day, 06-14-24 @ 18:29  aspirin  chewable 81 milliGRAM(s) Oral daily, 06-15-24 @ 00:00      GI Medications  bisacodyl Suppository 10 milliGRAM(s) Rectal daily, 06-14-24 @ 18:30, Routine PRN  pantoprazole   Suspension 40 milliGRAM(s) Enteral Tube two times a day, 06-14-24 @ 21:14, Routine  polyethylene glycol 3350 17 Gram(s) Oral daily, 06-15-24 @ 00:00, Routine  senna 2 Tablet(s) Oral at bedtime, 06-14-24 @ 18:29, Routine        LABS:                        8.9    8.31  )-----------( 298      ( 21 Jun 2024 14:30 )             27.4     06-21    129<L>  |  92<L>  |  53<H>  ----------------------------<  112<H>  3.9   |  29  |  4.90<H>    Ca    8.5      21 Jun 2024 14:30  Phos  3.2     06-21             VITALS:  T(C): 37 (06-22-24 @ 07:30), Max: 37 (06-22-24 @ 07:30)  T(F): 98.6 (06-22-24 @ 07:30), Max: 98.6 (06-22-24 @ 07:30)  HR: 58 (06-22-24 @ 07:30) (50 - 63)  BP: 141/50 (06-22-24 @ 07:30) (109/66 - 150/72)  BP(mean): --  ABP: --  ABP(mean): --  RR: 16 (06-22-24 @ 07:30) (15 - 16)  SpO2: 97% (06-22-24 @ 07:30) (97% - 99%)  CVP(mm Hg): --  CVP(cm H2O): --    Ins and Outs     06-21-24 @ 07:01  -  06-22-24 @ 07:00  --------------------------------------------------------  IN: 0 mL / OUT: 1500 mL / NET: -1500 mL                I&O's Detail    21 Jun 2024 07:01  -  22 Jun 2024 07:00  --------------------------------------------------------  IN:  Total IN: 0 mL    OUT:    Other (mL): 1500 mL  Total OUT: 1500 mL    Total NET: -1500 mL

## 2024-06-22 NOTE — PROGRESS NOTE ADULT - ASSESSMENT
Physical Examination:  GENERAL:               Alert,  No acute distress.    HEENT:                   No JVD, Dry MM + trach with no secretion   PULM:                     Bilateral air entry, diminished to auscultation bilaterally, no significant sputum production, No Rales, No Rhonchi, No Wheezing  CVS:                         S1, S2,  No Murmur  ABD:                        Soft, nondistended, nontender, normoactive bowel sounds, + PEG  EXT:                         No edema, nontender, No Cyanosis or Clubbing    NEURO:                  Alert,  interactive,  follows commands  PSYC:                      Calm,      Assessment  1. Chronic Respiratory failure s/p Trach  2. AMS appears to have improved , ? Baclofen toxicity vs CVA  3. ESRD on HD, DM 2,   4. Right iliac DVT on Eliquis      Plan  avoid frequent tracheal suctioning ; Oral suction ok  trach care  o2 Viat TC to keep humidity and sat > 92%  PMV with therapies and day time hours appears to be tolerating   capping with speech and advance to therapies as able.    continue Eliquis for DVT  d/w bedside team and speech  will follow

## 2024-06-22 NOTE — PROGRESS NOTE ADULT - SUBJECTIVE AND OBJECTIVE BOX
Cc: Gait dysfunction    HPI:  Patient seen and examined, no acute overnight events. Slept well, is tolerating PMV   Pain controlled, no chest pain, no N/V, no Fevers/Chills. No other new ROS  Last BM 6/22  Has been tolerating rehabilitation program.    acetaminophen   Oral Liquid .. 650 milliGRAM(s) Oral every 6 hours PRN  albuterol    0.083% 2.5 milliGRAM(s) Nebulizer every 6 hours  amLODIPine   Tablet 10 milliGRAM(s) Oral daily  apixaban 5 milliGRAM(s) Enteral Tube two times a day  aspirin  chewable 81 milliGRAM(s) Oral daily  atorvastatin 20 milliGRAM(s) Oral at bedtime  bisacodyl Suppository 10 milliGRAM(s) Rectal daily PRN  carvedilol 25 milliGRAM(s) Oral every 12 hours  chlorhexidine 2% Cloths 1 Application(s) Topical <User Schedule>  dextrose 10% Bolus 125 milliLiter(s) IV Bolus once  dextrose 5%. 1000 milliLiter(s) IV Continuous <Continuous>  dextrose 5%. 1000 milliLiter(s) IV Continuous <Continuous>  dextrose 50% Injectable 12.5 Gram(s) IV Push once  dextrose 50% Injectable 25 Gram(s) IV Push once  dextrose Oral Gel 15 Gram(s) Oral once PRN  epoetin reilly-epbx (RETACRIT) Injectable 59393 Unit(s) IV Push <User Schedule>  ferrous    sulfate Liquid 300 milliGRAM(s) Enteral Tube daily  glucagon  Injectable 1 milliGRAM(s) IntraMuscular once  hydrALAZINE 100 milliGRAM(s) Oral three times a day  insulin glargine Injectable (LANTUS) 10 Unit(s) SubCutaneous <User Schedule>  insulin lispro (ADMELOG) corrective regimen sliding scale   SubCutaneous Before meals and at bedtime  lidocaine   4% Patch 1 Patch Transdermal every 24 hours  lidocaine   4% Patch 1 Patch Transdermal every 24 hours  melatonin 3 milliGRAM(s) Oral at bedtime PRN  methyl salicylate 15%/menthol 10% Topical Cream 1 Application(s) Topical <User Schedule>  modafinil 50 milliGRAM(s) Oral <User Schedule>  Nephro-noam 1 Tablet(s) Oral daily  pantoprazole   Suspension 40 milliGRAM(s) Enteral Tube two times a day  polyethylene glycol 3350 17 Gram(s) Oral daily  senna 2 Tablet(s) Oral at bedtime      T(C): 37 (06-22-24 @ 07:30), Max: 37 (06-22-24 @ 07:30)  HR: 99 (06-22-24 @ 14:46) (56 - 99)  BP: 130/71 (06-22-24 @ 14:46) (109/66 - 150/72)  RR: 16 (06-22-24 @ 07:30) (15 - 16)  SpO2: 97% (06-22-24 @ 07:30) (97% - 99%)    In NAD  HEENT- NCAT  Heart- RRR, S1S2  Lungs- +trach with PMV, trach collar  Abd- + BS, NT, +PEG C/D/I  Ext- RUE AV fistula   Neuro- Exam unchanged          Imp: Patient with diagnosis of     toxic encephalopathy, L CVA     admitted for comprehensive acute rehabilitation.    Plan:  - toxic encephalopathy, L CVA - cont eliquis  - AHRF s/p trach - tolerating PMV during the day, pulm following, appreciate recs. monitor SpO2  - dysphagia - s/p PEG, currently on puree with mild thins + supplemental tube feeds.   - ESRD on HD MWF, renal following  - HTN /66 - 150/72, cont carvedilol, hydralazine, amlodipine, monitor BP  - Continue PT/OT/SLP  - DVT prophylaxis eliquis 5mg BID   - Skin- Turn q2h, check skin daily  - Continue current medications; patient medically stable.   - Patient is stable to continue current rehabilitation program.

## 2024-06-22 NOTE — PROGRESS NOTE ADULT - ASSESSMENT
73M HTN, ESRD (HD MWF via AVG) DM who was admitted for unstable angina further c/b altered mental status requiring intubation with CRRT requriment, acute CVA vs baclofen toxicity, DVT and GIB s/p PEG/trach. Notable course for AV Fistula dysfunction requiring balloon angioplasty. Now in AR.     labs 6/21 reviewed    Ischemic CVA vs toxic encephalopathy  - continue holding baclofen  -ASA/Statin  - NPO/Tube Feeds as below    Acute Hypoxemic, Hypercapnic Respiratory Failure, s/p ETT intubation  - s/p tracheostomy   - Albuterol neb q6h   - Pulm reccs appreciated    ESRD on HD MWF via RT AFF  - stable, continue HD    HTN  - Coreg 25 q12h  - Hydralazine 100 mg TID  - Norvasc 10 mg/D    DM  - A1c 6.9  - POCT reviewed and stable  - lantus 10units q 8am  - C/w ISS, FS before bolus feeds    R Common Iliac DVT  - CT AP (5/12) with nonocclusive RIGHT common iliac vein deep venous thrombosis  - Continue Eliquis 5 mg BID     #Oropharyngeal Dysphagia  - s/p PEG (5/17)  - Failed green dye 6/1   - Continue TF    DVT PPX: Eliquis    discussed with primary team

## 2024-06-23 LAB
GLUCOSE BLDC GLUCOMTR-MCNC: 150 MG/DL — HIGH (ref 70–99)
GLUCOSE BLDC GLUCOMTR-MCNC: 197 MG/DL — HIGH (ref 70–99)
GLUCOSE BLDC GLUCOMTR-MCNC: 54 MG/DL — CRITICAL LOW (ref 70–99)
GLUCOSE BLDC GLUCOMTR-MCNC: 54 MG/DL — CRITICAL LOW (ref 70–99)
GLUCOSE BLDC GLUCOMTR-MCNC: 80 MG/DL — SIGNIFICANT CHANGE UP (ref 70–99)
GLUCOSE BLDC GLUCOMTR-MCNC: 91 MG/DL — SIGNIFICANT CHANGE UP (ref 70–99)
GLUCOSE BLDC GLUCOMTR-MCNC: 92 MG/DL — SIGNIFICANT CHANGE UP (ref 70–99)
GLUCOSE BLDC GLUCOMTR-MCNC: 93 MG/DL — SIGNIFICANT CHANGE UP (ref 70–99)
GLUCOSE BLDC GLUCOMTR-MCNC: 95 MG/DL — SIGNIFICANT CHANGE UP (ref 70–99)

## 2024-06-23 PROCEDURE — 99232 SBSQ HOSP IP/OBS MODERATE 35: CPT

## 2024-06-23 PROCEDURE — 99233 SBSQ HOSP IP/OBS HIGH 50: CPT

## 2024-06-23 RX ORDER — DEXTROSE MONOHYDRATE AND SODIUM CHLORIDE 5; .3 G/100ML; G/100ML
125 INJECTION, SOLUTION INTRAVENOUS
Refills: 0 | Status: DISCONTINUED | OUTPATIENT
Start: 2024-06-23 | End: 2024-06-24

## 2024-06-23 RX ORDER — INSULIN LISPRO 100 [IU]/ML
INJECTION, SOLUTION SUBCUTANEOUS AT BEDTIME
Refills: 0 | Status: DISCONTINUED | OUTPATIENT
Start: 2024-06-23 | End: 2024-06-23

## 2024-06-23 RX ORDER — INSULIN LISPRO 100 [IU]/ML
INJECTION, SOLUTION SUBCUTANEOUS
Refills: 0 | Status: DISCONTINUED | OUTPATIENT
Start: 2024-06-23 | End: 2024-07-10

## 2024-06-23 RX ORDER — SCOPOLAMINE 1.5 MG/1
1 PATCH, EXTENDED RELEASE TRANSDERMAL ONCE
Refills: 0 | Status: DISCONTINUED | OUTPATIENT
Start: 2024-06-23 | End: 2024-06-24

## 2024-06-23 RX ORDER — DEXTROSE MONOHYDRATE AND SODIUM CHLORIDE 5; .3 G/100ML; G/100ML
1000 INJECTION, SOLUTION INTRAVENOUS
Refills: 0 | Status: DISCONTINUED | OUTPATIENT
Start: 2024-06-23 | End: 2024-06-24

## 2024-06-23 RX ADMIN — PANTOPRAZOLE SODIUM 40 MILLIGRAM(S): 40 INJECTION, POWDER, FOR SOLUTION INTRAVENOUS at 17:48

## 2024-06-23 RX ADMIN — Medication 2.5 MILLIGRAM(S): at 08:08

## 2024-06-23 RX ADMIN — MODAFINIL 50 MILLIGRAM(S): 100 TABLET ORAL at 05:58

## 2024-06-23 RX ADMIN — LIDOCAINE HCL 1 PATCH: 28 GEL TOPICAL at 22:48

## 2024-06-23 RX ADMIN — ATORVASTATIN CALCIUM 20 MILLIGRAM(S): 20 TABLET, FILM COATED ORAL at 22:53

## 2024-06-23 RX ADMIN — HYDRALAZINE HYDROCHLORIDE 100 MILLIGRAM(S): 50 TABLET ORAL at 13:19

## 2024-06-23 RX ADMIN — CARVEDILOL PHOSPHATE 25 MILLIGRAM(S): 80 CAPSULE, EXTENDED RELEASE ORAL at 05:57

## 2024-06-23 RX ADMIN — POLYETHYLENE GLYCOL 3350 17 GRAM(S): 1 POWDER ORAL at 13:30

## 2024-06-23 RX ADMIN — LIDOCAINE HCL 1 PATCH: 28 GEL TOPICAL at 05:46

## 2024-06-23 RX ADMIN — LIDOCAINE HCL 1 PATCH: 28 GEL TOPICAL at 17:49

## 2024-06-23 RX ADMIN — LIDOCAINE HCL 1 PATCH: 28 GEL TOPICAL at 22:46

## 2024-06-23 RX ADMIN — APIXABAN 5 MILLIGRAM(S): 5 TABLET, FILM COATED ORAL at 05:57

## 2024-06-23 RX ADMIN — ASPIRIN 81 MILLIGRAM(S): 325 TABLET, FILM COATED ORAL at 13:23

## 2024-06-23 RX ADMIN — AMLODIPINE BESYLATE 10 MILLIGRAM(S): 2.5 TABLET ORAL at 05:57

## 2024-06-23 RX ADMIN — Medication 2.5 MILLIGRAM(S): at 15:33

## 2024-06-23 RX ADMIN — MENTHOL, METHYL SALICYLATE 1 APPLICATION(S): 63; 210 PATCH PERCUTANEOUS; TOPICAL; TRANSDERMAL at 05:58

## 2024-06-23 RX ADMIN — Medication 1 TABLET(S): at 13:18

## 2024-06-23 RX ADMIN — PANTOPRAZOLE SODIUM 40 MILLIGRAM(S): 40 INJECTION, POWDER, FOR SOLUTION INTRAVENOUS at 05:58

## 2024-06-23 RX ADMIN — INSULIN GLARGINE 10 UNIT(S): 100 INJECTION, SOLUTION SUBCUTANEOUS at 08:10

## 2024-06-23 RX ADMIN — CARVEDILOL PHOSPHATE 25 MILLIGRAM(S): 80 CAPSULE, EXTENDED RELEASE ORAL at 17:48

## 2024-06-23 RX ADMIN — HYDRALAZINE HYDROCHLORIDE 100 MILLIGRAM(S): 50 TABLET ORAL at 05:58

## 2024-06-23 RX ADMIN — Medication 1 APPLICATION(S): at 05:58

## 2024-06-23 RX ADMIN — APIXABAN 5 MILLIGRAM(S): 5 TABLET, FILM COATED ORAL at 17:48

## 2024-06-23 RX ADMIN — LIDOCAINE HCL 1 PATCH: 28 GEL TOPICAL at 17:51

## 2024-06-23 RX ADMIN — DEXTROSE MONOHYDRATE AND SODIUM CHLORIDE 50 MILLILITER(S): 5; .3 INJECTION, SOLUTION INTRAVENOUS at 18:01

## 2024-06-23 RX ADMIN — MENTHOL, METHYL SALICYLATE 1 APPLICATION(S): 63; 210 PATCH PERCUTANEOUS; TOPICAL; TRANSDERMAL at 13:29

## 2024-06-23 RX ADMIN — DEXTROSE MONOHYDRATE AND SODIUM CHLORIDE 75 MILLILITER(S): 5; .3 INJECTION, SOLUTION INTRAVENOUS at 14:09

## 2024-06-23 RX ADMIN — MODAFINIL 50 MILLIGRAM(S): 100 TABLET ORAL at 13:22

## 2024-06-23 RX ADMIN — Medication 300 MILLIGRAM(S): at 13:16

## 2024-06-23 RX ADMIN — HYDRALAZINE HYDROCHLORIDE 100 MILLIGRAM(S): 50 TABLET ORAL at 22:53

## 2024-06-23 NOTE — PROVIDER CONTACT NOTE (HYPOGLYCEMIA EVENT) - NS PROVIDER CONTACT BACKGROUND-HYPO
Age: 73y    Gender: Male    POCT Blood Glucose:  150 mg/dL (06-23-24 @ 17:28)  91 mg/dL (06-23-24 @ 15:26)  95 mg/dL (06-23-24 @ 14:41)  93 mg/dL (06-23-24 @ 13:50)  80 mg/dL (06-23-24 @ 13:09)  54 mg/dL (06-23-24 @ 12:41)  54 mg/dL (06-23-24 @ 12:39)  92 mg/dL (06-23-24 @ 08:05)      eMAR:atorvastatin   20 milliGRAM(s) Oral (06-22-24 @ 21:59)    insulin glargine Injectable (LANTUS)   10 Unit(s) SubCutaneous (06-23-24 @ 08:10)    insulin lispro (ADMELOG) corrective regimen sliding scale   1 Unit(s) SubCutaneous (06-22-24 @ 21:58)

## 2024-06-23 NOTE — PROGRESS NOTE ADULT - SUBJECTIVE AND OBJECTIVE BOX
Cc: Gait dysfunction    HPI:  Patient seen and examined, no acute overnight events. Slept well, is tolerating PMV, needs to frequently suction oral secretions. Denies cough or SOB.   Pain controlled, no chest pain, no N/V, no Fevers/Chills. No other new ROS  Last BM 6/22  Has been tolerating rehabilitation program.      acetaminophen   Oral Liquid .. 650 milliGRAM(s) Oral every 6 hours PRN  albuterol    0.083% 2.5 milliGRAM(s) Nebulizer every 6 hours  amLODIPine   Tablet 10 milliGRAM(s) Oral daily  apixaban 5 milliGRAM(s) Enteral Tube two times a day  aspirin  chewable 81 milliGRAM(s) Oral daily  atorvastatin 20 milliGRAM(s) Oral at bedtime  bisacodyl Suppository 10 milliGRAM(s) Rectal daily PRN  carvedilol 25 milliGRAM(s) Oral every 12 hours  chlorhexidine 2% Cloths 1 Application(s) Topical <User Schedule>  dextrose 10% Bolus 125 milliLiter(s) IV Bolus once  dextrose 5%. 1000 milliLiter(s) IV Continuous <Continuous>  dextrose 5%. 1000 milliLiter(s) IV Continuous <Continuous>  dextrose 50% Injectable 12.5 Gram(s) IV Push once  dextrose 50% Injectable 25 Gram(s) IV Push once  dextrose Oral Gel 15 Gram(s) Oral once PRN  epoetin reilly-epbx (RETACRIT) Injectable 39054 Unit(s) IV Push <User Schedule>  ferrous    sulfate Liquid 300 milliGRAM(s) Enteral Tube daily  glucagon  Injectable 1 milliGRAM(s) IntraMuscular once  hydrALAZINE 100 milliGRAM(s) Oral three times a day  insulin glargine Injectable (LANTUS) 10 Unit(s) SubCutaneous <User Schedule>  insulin lispro (ADMELOG) corrective regimen sliding scale   SubCutaneous Before meals and at bedtime  lidocaine   4% Patch 1 Patch Transdermal every 24 hours  lidocaine   4% Patch 1 Patch Transdermal every 24 hours  melatonin 3 milliGRAM(s) Oral at bedtime PRN  methyl salicylate 15%/menthol 10% Topical Cream 1 Application(s) Topical <User Schedule>  modafinil 50 milliGRAM(s) Oral <User Schedule>  Nephro-noam 1 Tablet(s) Oral daily  pantoprazole   Suspension 40 milliGRAM(s) Enteral Tube two times a day  polyethylene glycol 3350 17 Gram(s) Oral daily  scopolamine 1 mG/72 Hr(s) Patch 1 Patch Transdermal once  senna 2 Tablet(s) Oral at bedtime          T(C): 36.6 (06-23-24 @ 08:07), Max: 36.6 (06-22-24 @ 19:58)  T(F): 97.8 (06-23-24 @ 08:07), Max: 97.9 (06-22-24 @ 19:58)  HR: 50 (06-23-24 @ 08:07) (50 - 99)  BP: 118/64 (06-23-24 @ 08:07) (118/64 - 169/67)  RR: 16 (06-23-24 @ 08:07) (15 - 16)  SpO2: 98% (06-23-24 @ 08:07) (98% - 98%)                In NAD  HEENT- NCAT, oral secretions  Heart- RRR, S1S2  Lungs- +trach with PMV, trach collar  Abd- + BS, NT, +PEG C/D/I  Ext- RUE AV fistula   Neuro- Exam unchanged          Imp: Patient with diagnosis of     toxic encephalopathy, L CVA     admitted for comprehensive acute rehabilitation.    Plan:  - toxic encephalopathy, L CVA - cont eliquis  - AHRF s/p trach - tolerating PMV during the day, pulm following, appreciate recs. monitor SpO2. 6/23 hospitalist added scopolamine patch to help manage secretions.   - dysphagia - s/p PEG, currently on puree with mild thins + supplemental tube feeds.   - ESRD on HD MWF, renal following  - HTN /64 - 169/67, cont carvedilol, hydralazine, amlodipine, monitor BP  - Continue PT/OT/SLP  - DVT prophylaxis eliquis 5mg BID   - Skin- Turn q2h, check skin daily  - Continue current medications; patient medically stable.   - Patient is stable to continue current rehabilitation program.

## 2024-06-23 NOTE — PROGRESS NOTE ADULT - SUBJECTIVE AND OBJECTIVE BOX
no acute events overnght  complaining of secretions      PHYSICAL EXAM:    Vital Signs Last 24 Hrs  T(F): 97.8 (23 Jun 2024 08:07), Max: 97.9 (22 Jun 2024 19:58)  HR: 50 (23 Jun 2024 08:07) (50 - 99)  BP: 118/64 (23 Jun 2024 08:07) (118/64 - 169/67)  RR: 16 (23 Jun 2024 08:07) (15 - 16)  SpO2: 98% (23 Jun 2024 08:07) (98% - 98%)  I&O's Summary      GENERAL: NAD  HEENT: NCAT +trach with valve placed  CHEST/LUNG: No increased WOB, Clear to percussion bilaterally; No rales, rhonchi, wheezing  HEART: Regular rate and rhythm; No murmurs  ABDOMEN: Soft, Nontender, Nondistended; Bowel sounds present  MUSCULOSKELETAL/EXTREMITIES:  2+ Peripheral Pulses, No LE edema  PSYCH: Appropriate affect, Alert & Oriented    LABS:                        8.9    8.31  )-----------( 298      ( 21 Jun 2024 14:30 )             27.4       06-21    129  |  92  |  53  ----------------------------<  112  3.9   |  29  |  4.90    Ca    8.5      21 Jun 2024 14:30  Phos  3.2     06-21                  05-17 Chol 86 mg/dL LDL -- HDL 27 mg/dL Trig 151 mg/dL              POCT Blood Glucose.: 92 mg/dL (23 Jun 2024 08:05)  POCT Blood Glucose.: 154 mg/dL (22 Jun 2024 21:53)  POCT Blood Glucose.: 96 mg/dL (22 Jun 2024 16:47)  POCT Blood Glucose.: 104 mg/dL (22 Jun 2024 11:45)      Urinalysis Basic - ( 21 Jun 2024 14:30 )    Color: x / Appearance: x / SG: x / pH: x  Gluc: 112 mg/dL / Ketone: x  / Bili: x / Urobili: x   Blood: x / Protein: x / Nitrite: x   Leuk Esterase: x / RBC: x / WBC x   Sq Epi: x / Non Sq Epi: x / Bacteria: x        COVID-19 PCR: NotDetec (06-14-24 @ 18:56)      MEDICATIONS  (STANDING):  albuterol    0.083% 2.5 milliGRAM(s) Nebulizer every 6 hours  amLODIPine   Tablet 10 milliGRAM(s) Oral daily  apixaban 5 milliGRAM(s) Enteral Tube two times a day  aspirin  chewable 81 milliGRAM(s) Oral daily  atorvastatin 20 milliGRAM(s) Oral at bedtime  carvedilol 25 milliGRAM(s) Oral every 12 hours  chlorhexidine 2% Cloths 1 Application(s) Topical <User Schedule>  dextrose 10% Bolus 125 milliLiter(s) IV Bolus once  dextrose 5%. 1000 milliLiter(s) (50 mL/Hr) IV Continuous <Continuous>  dextrose 5%. 1000 milliLiter(s) (100 mL/Hr) IV Continuous <Continuous>  dextrose 50% Injectable 12.5 Gram(s) IV Push once  dextrose 50% Injectable 25 Gram(s) IV Push once  epoetin reilly-epbx (RETACRIT) Injectable 84316 Unit(s) IV Push <User Schedule>  ferrous    sulfate Liquid 300 milliGRAM(s) Enteral Tube daily  glucagon  Injectable 1 milliGRAM(s) IntraMuscular once  hydrALAZINE 100 milliGRAM(s) Oral three times a day  insulin glargine Injectable (LANTUS) 10 Unit(s) SubCutaneous <User Schedule>  insulin lispro (ADMELOG) corrective regimen sliding scale   SubCutaneous Before meals and at bedtime  lidocaine   4% Patch 1 Patch Transdermal every 24 hours  lidocaine   4% Patch 1 Patch Transdermal every 24 hours  methyl salicylate 15%/menthol 10% Topical Cream 1 Application(s) Topical <User Schedule>  modafinil 50 milliGRAM(s) Oral <User Schedule>  Nephro-noam 1 Tablet(s) Oral daily  pantoprazole   Suspension 40 milliGRAM(s) Enteral Tube two times a day  polyethylene glycol 3350 17 Gram(s) Oral daily  scopolamine 1 mG/72 Hr(s) Patch 1 Patch Transdermal once  senna 2 Tablet(s) Oral at bedtime    MEDICATIONS  (PRN):  acetaminophen   Oral Liquid .. 650 milliGRAM(s) Oral every 6 hours PRN Moderate Pain (4 - 6)  bisacodyl Suppository 10 milliGRAM(s) Rectal daily PRN Constipation  dextrose Oral Gel 15 Gram(s) Oral once PRN Blood Glucose LESS THAN 70 milliGRAM(s)/deciliter  melatonin 3 milliGRAM(s) Oral at bedtime PRN Insomnia      Care Discussed with Consultants/Other Providers: Yes

## 2024-06-23 NOTE — PROGRESS NOTE ADULT - ASSESSMENT
73M HTN, ESRD (HD MWF via AVG) DM who was admitted for unstable angina further c/b altered mental status requiring intubation with CRRT requriment, acute CVA vs baclofen toxicity, DVT and GIB s/p PEG/trach. Notable course for AV Fistula dysfunction requiring balloon angioplasty. Now in AR.     Ischemic CVA vs toxic encephalopathy  - continue holding baclofen  -ASA/Statin  - NPO/Tube Feeds as below    Acute Hypoxemic, Hypercapnic Respiratory Failure, s/p ETT intubation  #increased secretions, sialorrhea  - s/p tracheostomy   - Albuterol neb q6h   - Pulm reccs appreciated    ESRD on HD MWF via RT AFF  - stable, continue HD    HTN  - Coreg 25 q12h  - Hydralazine 100 mg TID  - Norvasc 10 mg/D    DM  - A1c 6.9  - POCT reviewed and stable  - lantus 10units q 8am  - C/w ISS, FS before bolus feeds    R Common Iliac DVT  - CT AP (5/12) with nonocclusive RIGHT common iliac vein deep venous thrombosis  - Continue Eliquis 5 mg BID     #Oropharyngeal Dysphagia  - s/p PEG (5/17)  - Continue TF    DVT PPX: Eliquis    discussed with primary team     73M HTN, ESRD (HD MWF via AVG) DM who was admitted for unstable angina further c/b altered mental status requiring intubation with CRRT requriment, acute CVA vs baclofen toxicity, DVT and GIB s/p PEG/trach. Notable course for AV Fistula dysfunction requiring balloon angioplasty. Now in AR.     Ischemic CVA vs toxic encephalopathy  - continue holding baclofen  -ASA/Statin  - NPO/Tube Feeds as below    Acute Hypoxemic, Hypercapnic Respiratory Failure, s/p ETT intubation  #increased secretions, sialorrhea  - scopolamine patch added  - s/p tracheostomy   - Albuterol neb q6h   - Pulm reccs appreciated    ESRD on HD MWF via RT AFF  - stable, continue HD    HTN  - Coreg 25 q12h  - Hydralazine 100 mg TID  - Norvasc 10 mg/D    DM  - A1c 6.9  - POCT reviewed and stable  - lantus 10units q 8am  - C/w ISS, FS before bolus feeds    R Common Iliac DVT  - CT AP (5/12) with nonocclusive RIGHT common iliac vein deep venous thrombosis  - Continue Eliquis 5 mg BID     #Oropharyngeal Dysphagia  - s/p PEG (5/17)  - Continue TF    DVT PPX: Eliquis    discussed with primary team     73M HTN, ESRD (HD MWF via AVG) DM who was admitted for unstable angina further c/b altered mental status requiring intubation with CRRT requriment, acute CVA vs baclofen toxicity, DVT and GIB s/p PEG/trach. Notable course for AV Fistula dysfunction requiring balloon angioplasty. Now in AR.     Ischemic CVA vs toxic encephalopathy  - continue holding baclofen  -ASA/Statin  - NPO/Tube Feeds as below    DM with hypoglycemia 6/23  - A1c 6.9  - given dextrose with resulting FS 93, started on 125 cc of D5LR @75cc/h given patient refusing further po resuscitation for goal POCT 100.  - lantus 10units q 8am discontinued  - continue sensitive sliding scale w lispro  - C/w ISS, FS before bolus feeds    Acute Hypoxemic, Hypercapnic Respiratory Failure, s/p ETT intubation  #increased secretions, sialorrhea  - scopolamine patch added  - s/p tracheostomy   - Albuterol neb q6h   - Pulm reccs appreciated    ESRD on HD MWF via RT AFF  - stable, continue HD    HTN  - Coreg 25 q12h  - Hydralazine 100 mg TID  - Norvasc 10 mg/D        R Common Iliac DVT  - CT AP (5/12) with nonocclusive RIGHT common iliac vein deep venous thrombosis  - Continue Eliquis 5 mg BID     #Oropharyngeal Dysphagia  - s/p PEG (5/17)  - Continue TF    DVT PPX: Eliquis    discussed with primary team     73M HTN, ESRD (HD MWF via AVG) DM who was admitted for unstable angina further c/b altered mental status requiring intubation with CRRT requriment, acute CVA vs baclofen toxicity, DVT and GIB s/p PEG/trach. Notable course for AV Fistula dysfunction requiring balloon angioplasty. Now in AR.     Ischemic CVA vs toxic encephalopathy  - continue holding baclofen  -ASA/Statin  - NPO/Tube Feeds as below    DM with hypoglycemia 6/23  - A1c 6.9  - given dextrose with resulting FS 93, started on 125 cc of D5LR @75cc/h given patient refusing further po resuscitation for goal POCT 100.  - lantus 10units q 8am discontinued  - continue sensitive sliding scale w lispro  - C/w ISS, FS before bolus feeds    Addendum: notified by nurse at 1540 that patient's FS remains in 90s. Continue TF bolus and will keep d5lr @50cc/h until POCT is between 100-180 range. May discontinue D5LR if POCT remains in 100-180 range.     Acute Hypoxemic, Hypercapnic Respiratory Failure, s/p ETT intubation  #increased secretions, sialorrhea  - scopolamine patch added  - s/p tracheostomy   - Albuterol neb q6h   - Pulm reccs appreciated    ESRD on HD MWF via RT AFF  - stable, continue HD    HTN  - Coreg 25 q12h  - Hydralazine 100 mg TID  - Norvasc 10 mg/D        R Common Iliac DVT  - CT AP (5/12) with nonocclusive RIGHT common iliac vein deep venous thrombosis  - Continue Eliquis 5 mg BID     #Oropharyngeal Dysphagia  - s/p PEG (5/17)  - Continue TF    DVT PPX: Eliquis    discussed with primary team

## 2024-06-23 NOTE — PROVIDER CONTACT NOTE (HYPOGLYCEMIA EVENT) - NS PROVIDER CONTACT ASSESS-HYPO
Pt's lunch glucose 53, rechecked 53 again, hypoglycemic protocol was initiated. Cranberry juice was given twice, patient's glucose was still under 100, hospitalist Yeimi was notified and started pt on D5W and LR to run at 75ml/hr. Pt has poor PO intake, has PEG and was given a bolus feed, glucose then increased to 150.

## 2024-06-24 LAB
ANION GAP SERPL CALC-SCNC: 9 MMOL/L — SIGNIFICANT CHANGE UP (ref 5–17)
BUN SERPL-MCNC: 52 MG/DL — HIGH (ref 7–23)
CALCIUM SERPL-MCNC: 8.5 MG/DL — SIGNIFICANT CHANGE UP (ref 8.4–10.5)
CHLORIDE SERPL-SCNC: 95 MMOL/L — LOW (ref 96–108)
CO2 SERPL-SCNC: 28 MMOL/L — SIGNIFICANT CHANGE UP (ref 22–31)
CREAT SERPL-MCNC: 6.23 MG/DL — HIGH (ref 0.5–1.3)
EGFR: 9 ML/MIN/1.73M2 — LOW
GLUCOSE BLDC GLUCOMTR-MCNC: 110 MG/DL — HIGH (ref 70–99)
GLUCOSE BLDC GLUCOMTR-MCNC: 127 MG/DL — HIGH (ref 70–99)
GLUCOSE BLDC GLUCOMTR-MCNC: 174 MG/DL — HIGH (ref 70–99)
GLUCOSE BLDC GLUCOMTR-MCNC: 187 MG/DL — HIGH (ref 70–99)
GLUCOSE SERPL-MCNC: 177 MG/DL — HIGH (ref 70–99)
HCT VFR BLD CALC: 29.4 % — LOW (ref 39–50)
HGB BLD-MCNC: 9.2 G/DL — LOW (ref 13–17)
MCHC RBC-ENTMCNC: 22.4 PG — LOW (ref 27–34)
MCHC RBC-ENTMCNC: 31.3 GM/DL — LOW (ref 32–36)
MCV RBC AUTO: 71.7 FL — LOW (ref 80–100)
NRBC # BLD: 0 /100 WBCS — SIGNIFICANT CHANGE UP (ref 0–0)
PHOSPHATE SERPL-MCNC: 4.6 MG/DL — HIGH (ref 2.5–4.5)
PLATELET # BLD AUTO: 336 K/UL — SIGNIFICANT CHANGE UP (ref 150–400)
POTASSIUM SERPL-MCNC: 3.9 MMOL/L — SIGNIFICANT CHANGE UP (ref 3.5–5.3)
POTASSIUM SERPL-SCNC: 3.9 MMOL/L — SIGNIFICANT CHANGE UP (ref 3.5–5.3)
RBC # BLD: 4.1 M/UL — LOW (ref 4.2–5.8)
RBC # FLD: 22.3 % — HIGH (ref 10.3–14.5)
SODIUM SERPL-SCNC: 132 MMOL/L — LOW (ref 135–145)
WBC # BLD: 8.62 K/UL — SIGNIFICANT CHANGE UP (ref 3.8–10.5)
WBC # FLD AUTO: 8.62 K/UL — SIGNIFICANT CHANGE UP (ref 3.8–10.5)

## 2024-06-24 PROCEDURE — 99232 SBSQ HOSP IP/OBS MODERATE 35: CPT | Mod: GC

## 2024-06-24 PROCEDURE — 99232 SBSQ HOSP IP/OBS MODERATE 35: CPT

## 2024-06-24 RX ADMIN — ATORVASTATIN CALCIUM 20 MILLIGRAM(S): 20 TABLET, FILM COATED ORAL at 23:17

## 2024-06-24 RX ADMIN — LIDOCAINE HCL 1 PATCH: 28 GEL TOPICAL at 17:53

## 2024-06-24 RX ADMIN — APIXABAN 5 MILLIGRAM(S): 5 TABLET, FILM COATED ORAL at 06:41

## 2024-06-24 RX ADMIN — MODAFINIL 50 MILLIGRAM(S): 100 TABLET ORAL at 06:54

## 2024-06-24 RX ADMIN — APIXABAN 5 MILLIGRAM(S): 5 TABLET, FILM COATED ORAL at 17:50

## 2024-06-24 RX ADMIN — LIDOCAINE HCL 1 PATCH: 28 GEL TOPICAL at 23:22

## 2024-06-24 RX ADMIN — Medication 2.5 MILLIGRAM(S): at 08:17

## 2024-06-24 RX ADMIN — CARVEDILOL PHOSPHATE 25 MILLIGRAM(S): 80 CAPSULE, EXTENDED RELEASE ORAL at 06:42

## 2024-06-24 RX ADMIN — ERYTHROPOIETIN 10000 UNIT(S): 4000 INJECTION, SOLUTION INTRAVENOUS; SUBCUTANEOUS at 06:55

## 2024-06-24 RX ADMIN — MODAFINIL 50 MILLIGRAM(S): 100 TABLET ORAL at 12:21

## 2024-06-24 RX ADMIN — CARVEDILOL PHOSPHATE 25 MILLIGRAM(S): 80 CAPSULE, EXTENDED RELEASE ORAL at 17:50

## 2024-06-24 RX ADMIN — Medication 2 TABLET(S): at 23:21

## 2024-06-24 RX ADMIN — ASPIRIN 81 MILLIGRAM(S): 325 TABLET, FILM COATED ORAL at 12:18

## 2024-06-24 RX ADMIN — Medication 1 APPLICATION(S): at 06:57

## 2024-06-24 RX ADMIN — AMLODIPINE BESYLATE 10 MILLIGRAM(S): 2.5 TABLET ORAL at 06:41

## 2024-06-24 RX ADMIN — PANTOPRAZOLE SODIUM 40 MILLIGRAM(S): 40 INJECTION, POWDER, FOR SOLUTION INTRAVENOUS at 06:57

## 2024-06-24 RX ADMIN — HYDRALAZINE HYDROCHLORIDE 100 MILLIGRAM(S): 50 TABLET ORAL at 23:16

## 2024-06-24 RX ADMIN — PANTOPRAZOLE SODIUM 40 MILLIGRAM(S): 40 INJECTION, POWDER, FOR SOLUTION INTRAVENOUS at 17:53

## 2024-06-24 RX ADMIN — MENTHOL, METHYL SALICYLATE 1 APPLICATION(S): 63; 210 PATCH PERCUTANEOUS; TOPICAL; TRANSDERMAL at 13:24

## 2024-06-24 RX ADMIN — HYDRALAZINE HYDROCHLORIDE 100 MILLIGRAM(S): 50 TABLET ORAL at 13:26

## 2024-06-24 RX ADMIN — INSULIN LISPRO 1: 100 INJECTION, SOLUTION SUBCUTANEOUS at 12:17

## 2024-06-24 RX ADMIN — Medication 300 MILLIGRAM(S): at 12:18

## 2024-06-24 RX ADMIN — Medication 1 TABLET(S): at 12:17

## 2024-06-24 RX ADMIN — MENTHOL, METHYL SALICYLATE 1 APPLICATION(S): 63; 210 PATCH PERCUTANEOUS; TOPICAL; TRANSDERMAL at 06:58

## 2024-06-24 RX ADMIN — INSULIN LISPRO 1: 100 INJECTION, SOLUTION SUBCUTANEOUS at 08:22

## 2024-06-24 RX ADMIN — POLYETHYLENE GLYCOL 3350 17 GRAM(S): 1 POWDER ORAL at 12:18

## 2024-06-24 RX ADMIN — LIDOCAINE HCL 1 PATCH: 28 GEL TOPICAL at 17:52

## 2024-06-24 RX ADMIN — LIDOCAINE HCL 1 PATCH: 28 GEL TOPICAL at 07:39

## 2024-06-24 RX ADMIN — DEXTROSE MONOHYDRATE AND SODIUM CHLORIDE 50 MILLILITER(S): 5; .3 INJECTION, SOLUTION INTRAVENOUS at 07:02

## 2024-06-24 RX ADMIN — Medication 2.5 MILLIGRAM(S): at 20:53

## 2024-06-24 RX ADMIN — HYDRALAZINE HYDROCHLORIDE 100 MILLIGRAM(S): 50 TABLET ORAL at 06:41

## 2024-06-24 NOTE — PROGRESS NOTE ADULT - SUBJECTIVE AND OBJECTIVE BOX
no acute events overnight  sugars better      PHYSICAL EXAM:    Vital Signs Last 24 Hrs  T(F): 98.2 (24 Jun 2024 07:21), Max: 98.2 (24 Jun 2024 07:21)  HR: 55 (24 Jun 2024 08:18) (55 - 66)  BP: 177/68 (24 Jun 2024 07:21) (134/72 - 177/68)  RR: 16 (24 Jun 2024 07:21) (16 - 18)  SpO2: 98% (24 Jun 2024 08:18) (98% - 99%)  I&O's Summary    23 Jun 2024 07:01  -  24 Jun 2024 07:00  --------------------------------------------------------  IN: 1240 mL / OUT: 0 mL / NET: 1240 mL        GENERAL: NAD  HEENT: NCAT  CHEST/LUNG: No increased WOB, Clear to percussion bilaterally; No rales, rhonchi, wheezing  HEART: Regular rate and rhythm; No murmurs  ABDOMEN: Soft, Nontender, Nondistended; Bowel sounds present  MUSCULOSKELETAL/EXTREMITIES:  2+ Peripheral Pulses, No LE edema  PSYCH: Appropriate affect, Alert & Oriented    LABS:                        8.9    8.31  )-----------( 298      ( 21 Jun 2024 14:30 )             27.4       06-21    129  |  92  |  53  ----------------------------<  112  3.9   |  29  |  4.90    Ca    8.5      21 Jun 2024 14:30  Phos  3.2     06-21                  05-17 Chol 86 mg/dL LDL -- HDL 27 mg/dL Trig 151 mg/dL              POCT Blood Glucose.: 174 mg/dL (24 Jun 2024 12:03)  POCT Blood Glucose.: 187 mg/dL (24 Jun 2024 08:18)  POCT Blood Glucose.: 197 mg/dL (23 Jun 2024 21:56)  POCT Blood Glucose.: 150 mg/dL (23 Jun 2024 17:28)  POCT Blood Glucose.: 91 mg/dL (23 Jun 2024 15:26)  POCT Blood Glucose.: 95 mg/dL (23 Jun 2024 14:41)      Urinalysis Basic - ( 21 Jun 2024 14:30 )    Color: x / Appearance: x / SG: x / pH: x  Gluc: 112 mg/dL / Ketone: x  / Bili: x / Urobili: x   Blood: x / Protein: x / Nitrite: x   Leuk Esterase: x / RBC: x / WBC x   Sq Epi: x / Non Sq Epi: x / Bacteria: x        COVID-19 PCR: NotDetec (06-14-24 @ 18:56)      MEDICATIONS  (STANDING):  albuterol    0.083% 2.5 milliGRAM(s) Nebulizer every 6 hours  amLODIPine   Tablet 10 milliGRAM(s) Oral daily  apixaban 5 milliGRAM(s) Enteral Tube two times a day  aspirin  chewable 81 milliGRAM(s) Oral daily  atorvastatin 20 milliGRAM(s) Oral at bedtime  carvedilol 25 milliGRAM(s) Oral every 12 hours  chlorhexidine 2% Cloths 1 Application(s) Topical <User Schedule>  dextrose 10% Bolus 125 milliLiter(s) IV Bolus once  dextrose 5%. 1000 milliLiter(s) (100 mL/Hr) IV Continuous <Continuous>  dextrose 5%. 1000 milliLiter(s) (50 mL/Hr) IV Continuous <Continuous>  dextrose 50% Injectable 25 Gram(s) IV Push once  dextrose 50% Injectable 12.5 Gram(s) IV Push once  epoetin reilly-epbx (RETACRIT) Injectable 16541 Unit(s) IV Push <User Schedule>  ferrous    sulfate Liquid 300 milliGRAM(s) Enteral Tube daily  glucagon  Injectable 1 milliGRAM(s) IntraMuscular once  hydrALAZINE 100 milliGRAM(s) Oral three times a day  insulin lispro (ADMELOG) corrective regimen sliding scale   SubCutaneous three times a day before meals  lidocaine   4% Patch 1 Patch Transdermal every 24 hours  lidocaine   4% Patch 1 Patch Transdermal every 24 hours  methyl salicylate 15%/menthol 10% Topical Cream 1 Application(s) Topical <User Schedule>  modafinil 50 milliGRAM(s) Oral <User Schedule>  Nephro-noam 1 Tablet(s) Oral daily  pantoprazole   Suspension 40 milliGRAM(s) Enteral Tube two times a day  polyethylene glycol 3350 17 Gram(s) Oral daily  senna 2 Tablet(s) Oral at bedtime    MEDICATIONS  (PRN):  acetaminophen   Oral Liquid .. 650 milliGRAM(s) Oral every 6 hours PRN Moderate Pain (4 - 6)  bisacodyl Suppository 10 milliGRAM(s) Rectal daily PRN Constipation  dextrose Oral Gel 15 Gram(s) Oral once PRN Blood Glucose LESS THAN 70 milliGRAM(s)/deciliter  melatonin 3 milliGRAM(s) Oral at bedtime PRN Insomnia      Care Discussed with Consultants/Other Providers: Yes

## 2024-06-24 NOTE — CHART NOTE - NSCHARTNOTEFT_GEN_A_CORE
Nutrition Follow Up Note  Hospital Course   (Per Electronic Medical Record)    Source:  Patient [X]  Medical Record [X]      Diet:   Puree (IDDSI Level 4/Dysphagia 1) Diet w/ Mildly Thick Liquids (IDDSI Level 2/Nectar Thick)  Tolerates Diet Consistency Well  No Chewing/Swallowing Difficulties - (Per Patient Observation)   Varied Intake @Meals (as Per Documentation)   Education Provided on Proper Nutrition  Obtained Food Preferences from Patient    Enteral/Parenteral Nutrition: If Patient Consumes Less than 50% of Meal, Provide Bolus Feed - Nepro 8oz TID (Provides 1,275kcal & 57grams of Protein)    Manual Flush 240ml Q8hrs     Current Weight: 109.1lb on 6/24  Obtain New Weight  Obtain Weights Daily     Pertinent Medications: MEDICATIONS  (STANDING):  albuterol    0.083% 2.5 milliGRAM(s) Nebulizer every 6 hours  amLODIPine   Tablet 10 milliGRAM(s) Oral daily  apixaban 5 milliGRAM(s) Enteral Tube two times a day  aspirin  chewable 81 milliGRAM(s) Oral daily  atorvastatin 20 milliGRAM(s) Oral at bedtime  carvedilol 25 milliGRAM(s) Oral every 12 hours  chlorhexidine 2% Cloths 1 Application(s) Topical <User Schedule>  dextrose 10% Bolus 125 milliLiter(s) IV Bolus once  dextrose 5%. 1000 milliLiter(s) (50 mL/Hr) IV Continuous <Continuous>  dextrose 5%. 1000 milliLiter(s) (100 mL/Hr) IV Continuous <Continuous>  dextrose 50% Injectable 12.5 Gram(s) IV Push once  dextrose 50% Injectable 25 Gram(s) IV Push once  epoetin reilly-epbx (RETACRIT) Injectable 10020 Unit(s) IV Push <User Schedule>  ferrous    sulfate Liquid 300 milliGRAM(s) Enteral Tube daily  glucagon  Injectable 1 milliGRAM(s) IntraMuscular once  hydrALAZINE 100 milliGRAM(s) Oral three times a day  insulin lispro (ADMELOG) corrective regimen sliding scale   SubCutaneous three times a day before meals  lidocaine   4% Patch 1 Patch Transdermal every 24 hours  lidocaine   4% Patch 1 Patch Transdermal every 24 hours  methyl salicylate 15%/menthol 10% Topical Cream 1 Application(s) Topical <User Schedule>  modafinil 50 milliGRAM(s) Oral <User Schedule>  Nephro-noam 1 Tablet(s) Oral daily  pantoprazole   Suspension 40 milliGRAM(s) Enteral Tube two times a day  polyethylene glycol 3350 17 Gram(s) Oral daily  scopolamine 1 mG/72 Hr(s) Patch 1 Patch Transdermal once  senna 2 Tablet(s) Oral at bedtime    MEDICATIONS  (PRN):  acetaminophen   Oral Liquid .. 650 milliGRAM(s) Oral every 6 hours PRN Moderate Pain (4 - 6)  bisacodyl Suppository 10 milliGRAM(s) Rectal daily PRN Constipation  dextrose Oral Gel 15 Gram(s) Oral once PRN Blood Glucose LESS THAN 70 milliGRAM(s)/deciliter  melatonin 3 milliGRAM(s) Oral at bedtime PRN Insomnia    Pertinent Labs:  06-21 Na129 mmol/L<L> Glu 112 mg/dL<H> K+ 3.9 mmol/L Cr  4.90 mg/dL<H> BUN 53 mg/dL<H> 06-21 Phos 3.2 mg/dL 06-17 Alb 2.6 g/dL<L>    Skin: No Pressure Ulcers     Edema: None Noted (as Per Documentation)     Last Bowel Movement: on 6/23    Estimated Needs:   [X] No Change Since Previous Assessment    Previous Nutrition Diagnosis:   Severe Malnutrition  Swallowing Difficulties     Nutrition Diagnosis is [X] Ongoing - Continues on Textured/Mechanically Altered Diet & Thickened Liquids; Nutrition Education Provided     New Nutrition Diagnosis: [X] Not Applicable    Interventions:   1. Education Provided on Proper Nutrition    2. Recommend Continue Nutrition Plan of Care     Monitoring & Evaluation:   [X] Weights   [X] PO Intake   [X] Skin Integrity   [X] Follow Up (Per Protocol)  [X] Tolerance to Diet Prescription   [X] Other: Labs     Registered Dietitian/Nutritionist Remains Available.  Rubén Borden RDN, CDN    Phone# [phone]

## 2024-06-24 NOTE — PROGRESS NOTE ADULT - SUBJECTIVE AND OBJECTIVE BOX
Follow-up Pulmonary Progress Note  Chief Complaint : Cerebral infarction    pt doing well  tolerating PMV  no secretions noted today orally         Allergies :No Known Allergies      PAST MEDICAL & SURGICAL HISTORY:  Diabetes    Benign essential HTN    HLD (hyperlipidemia)    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    ESRD on hemodialysis    Arteriovenous fistula        Medications:  MEDICATIONS  (STANDING):  albuterol    0.083% 2.5 milliGRAM(s) Nebulizer every 6 hours  amLODIPine   Tablet 10 milliGRAM(s) Oral daily  apixaban 5 milliGRAM(s) Enteral Tube two times a day  aspirin  chewable 81 milliGRAM(s) Oral daily  atorvastatin 20 milliGRAM(s) Oral at bedtime  carvedilol 25 milliGRAM(s) Oral every 12 hours  chlorhexidine 2% Cloths 1 Application(s) Topical <User Schedule>  dextrose 10% Bolus 125 milliLiter(s) IV Bolus once  dextrose 5%. 1000 milliLiter(s) (100 mL/Hr) IV Continuous <Continuous>  dextrose 5%. 1000 milliLiter(s) (50 mL/Hr) IV Continuous <Continuous>  dextrose 50% Injectable 25 Gram(s) IV Push once  dextrose 50% Injectable 12.5 Gram(s) IV Push once  epoetin reilly-epbx (RETACRIT) Injectable 67665 Unit(s) IV Push <User Schedule>  ferrous    sulfate Liquid 300 milliGRAM(s) Enteral Tube daily  glucagon  Injectable 1 milliGRAM(s) IntraMuscular once  hydrALAZINE 100 milliGRAM(s) Oral three times a day  insulin lispro (ADMELOG) corrective regimen sliding scale   SubCutaneous three times a day before meals  lidocaine   4% Patch 1 Patch Transdermal every 24 hours  lidocaine   4% Patch 1 Patch Transdermal every 24 hours  methyl salicylate 15%/menthol 10% Topical Cream 1 Application(s) Topical <User Schedule>  modafinil 50 milliGRAM(s) Oral <User Schedule>  Nephro-noam 1 Tablet(s) Oral daily  pantoprazole   Suspension 40 milliGRAM(s) Enteral Tube two times a day  polyethylene glycol 3350 17 Gram(s) Oral daily  scopolamine 1 mG/72 Hr(s) Patch 1 Patch Transdermal once  senna 2 Tablet(s) Oral at bedtime    MEDICATIONS  (PRN):  acetaminophen   Oral Liquid .. 650 milliGRAM(s) Oral every 6 hours PRN Moderate Pain (4 - 6)  bisacodyl Suppository 10 milliGRAM(s) Rectal daily PRN Constipation  dextrose Oral Gel 15 Gram(s) Oral once PRN Blood Glucose LESS THAN 70 milliGRAM(s)/deciliter  melatonin 3 milliGRAM(s) Oral at bedtime PRN Insomnia      Antibiotics History      Heme Medications   apixaban 5 milliGRAM(s) Enteral Tube two times a day, 06-14-24 @ 18:29  aspirin  chewable 81 milliGRAM(s) Oral daily, 06-15-24 @ 00:00      GI Medications  bisacodyl Suppository 10 milliGRAM(s) Rectal daily, 06-14-24 @ 18:30, Routine PRN  pantoprazole   Suspension 40 milliGRAM(s) Enteral Tube two times a day, 06-14-24 @ 21:14, Routine  polyethylene glycol 3350 17 Gram(s) Oral daily, 06-15-24 @ 00:00, Routine  senna 2 Tablet(s) Oral at bedtime, 06-14-24 @ 18:29, Routine       VITALS:  T(C): 36.8 (06-24-24 @ 07:21), Max: 36.8 (06-24-24 @ 07:21)  T(F): 98.2 (06-24-24 @ 07:21), Max: 98.2 (06-24-24 @ 07:21)  HR: 55 (06-24-24 @ 08:18) (54 - 66)  BP: 177/68 (06-24-24 @ 07:21) (113/73 - 177/68)  BP(mean): --  ABP: --  ABP(mean): --  RR: 16 (06-24-24 @ 07:21) (16 - 18)  SpO2: 98% (06-24-24 @ 08:18) (98% - 99%)  CVP(mm Hg): --  CVP(cm H2O): --    Ins and Outs     06-23-24 @ 07:01  -  06-24-24 @ 07:00  --------------------------------------------------------  IN: 1240 mL / OUT: 0 mL / NET: 1240 mL        Height (cm): 158 (06-23-24 @ 14:00)  Weight (kg): 64 (06-23-24 @ 14:00)  BMI (kg/m2): 25.6 (06-23-24 @ 14:00)        I&O's Detail    23 Jun 2024 07:01  -  24 Jun 2024 07:00  --------------------------------------------------------  IN:    dextrose 5% + lactated ringers: 300 mL    dextrose 5% + lactated ringers: 700 mL    Nepro: 240 mL  Total IN: 1240 mL    OUT:  Total OUT: 0 mL    Total NET: 1240 mL

## 2024-06-24 NOTE — PROGRESS NOTE ADULT - ASSESSMENT
73M HTN, ESRD (HD MWF via AVG) DM who was admitted for unstable angina further c/b altered mental status requiring intubation with CRRT requriment, acute CVA vs baclofen toxicity, DVT and GIB s/p PEG/trach. Notable course for AV Fistula dysfunction requiring balloon angioplasty. Now in AR.     Ischemic CVA vs toxic encephalopathy  - continue holding baclofen  -ASA/Statin  - po tray plus Tube Feeds as below    DM with hypoglycemia 6/23,improved  - A1c 6.9  - lantus was discontinued yesterday due to FS in 50's  - continue sensitive sliding scale. FS have ranged from 100-180's today. if FS >200 start lantus 5units daily.  - continue tube feeds if he is not eating po    Acute Hypoxemic, Hypercapnic Respiratory Failure, s/p ETT intubation  #increased secretions, sialorrhea  - scopolamine patch ordered 6/23, monitor secretions.  - s/p tracheostomy   - Albuterol neb q6h   - Pulm reccs appreciated    ESRD on HD MWF via RT AFF  - stable, continue HD    HTN  - Coreg 25 q12h  - Hydralazine 100 mg TID  - Norvasc 10 mg/D    R Common Iliac DVT  - CT AP (5/12) with nonocclusive RIGHT common iliac vein deep venous thrombosis  - Continue Eliquis 5 mg BID     #Oropharyngeal Dysphagia  - s/p PEG (5/17)  - Continue TF    DVT PPX: Eliquis    discussed with primary team

## 2024-06-24 NOTE — PROGRESS NOTE ADULT - ASSESSMENT
Physical Exam   GENERAL:               Alert,  No acute distress.    HEENT:                   No JVD, Dry MM + trach with no secretion   PULM:                     Bilateral air entry, diminished to auscultation bilaterally, no significant sputum production, No Rales, No Rhonchi, No Wheezing  CVS:                         S1, S2,  No Murmur  ABD:                        Soft, nondistended, nontender, normoactive bowel sounds, + PEG  EXT:                         No edema, nontender, No Cyanosis or Clubbing    NEURO:                  Alert,  interactive,  follows commands  PSYC:                      Calm,      Assessment  1. Chronic Respiratory failure s/p Trach  2. AMS appears to have improved , ? Baclofen toxicity vs CVA  3. ESRD on HD, DM 2,   4. Right iliac DVT on Eliquis      Plan  avoid frequent tracheal suctioning ; Oral suction ok  trach care  o2 Viat TC to keep humidity and sat > 92%  PMV with therapies and day time hours appears to be tolerating   capping with speech and advance to therapies as able.  d/w Speech if tolerating today with capping would advance capping to day time hours  continue Eliquis for DVT  d/w peech  will follow

## 2024-06-24 NOTE — PROGRESS NOTE ADULT - SUBJECTIVE AND OBJECTIVE BOX
73-year-old male w/ past medical history hypertension, ESRD (HD MWF)  insulin-dependent DM presented 4/29/24 to [facility] with hiccups, chest pain of several days duration, admitted for concern demand ischemia. Early on, he was given baclofen for his hiccups, but soon developed AMS with 2 RRTs by 5/2/24, prompting intubation, upgrade to MICU. There was concern that baclofen toxicity was the culprit of his then-encephalopathic state, as patient had also missed a HD session due to a clotted fistula.     In coming days, he was treated empirically with 5 days of zosyn, had a negative CTH and LP, and had an EEG that did not capture a seizure but showed diffuse non-specific cerebral dysfunction - c/w toxic-metabolic encephalopathies. On 5/6, an MRI showed that the patient had sustained R pontine and cerebellar stroke. He was extubated on 5/9, but owing to a challenging extubation became septic w/ B Cereus, treated with Vanc. He had trach placed 5/14, PEG 5/17, then downgraded.     On the floors, he was followed by several services, including Neurology, who mention the strokes were embolic appearing. Subsequent hospital course notable for nonocclusive R common iliac DVT, for which he was started on a heparin drip which was stopped when patient developed an upper GI Bleed, though GI was consulted and felt he did not have an overt bleed. His dialysis fistula site was stenotic, s/p fistulogram, ultimately changed to a RUE AVF 6/5. He was ultimately started on Eliquis 6/7 for the DVT. Medically stable, he is admitted to [facility] on 6/14/24.     ROS/subjective:  patient seen and evaluated bedside  episode hypoglycemia over weekend secondary to  poor PO with PEG feeds not given  seen OOB in WC today- tolerated po feedings- diet advanced by speech to minced moist with mild thicks  capped 8 hours with speech- no issues  tolerating Provigil  Knee pain better with arden sharma  Voiding, moving Bowels- last BM today 6/23  no dizziness, no headaches, no nausea, no vomiting, no SOB, no chest pain  HD today      MEDICATIONS  (STANDING):  albuterol    0.083% 2.5 milliGRAM(s) Nebulizer every 6 hours  amLODIPine   Tablet 10 milliGRAM(s) Oral daily  apixaban 5 milliGRAM(s) Enteral Tube two times a day  aspirin  chewable 81 milliGRAM(s) Oral daily  atorvastatin 20 milliGRAM(s) Oral at bedtime  carvedilol 25 milliGRAM(s) Oral every 12 hours  chlorhexidine 2% Cloths 1 Application(s) Topical <User Schedule>  dextrose 10% Bolus 125 milliLiter(s) IV Bolus once  dextrose 5%. 1000 milliLiter(s) (100 mL/Hr) IV Continuous <Continuous>  dextrose 5%. 1000 milliLiter(s) (50 mL/Hr) IV Continuous <Continuous>  dextrose 50% Injectable 25 Gram(s) IV Push once  dextrose 50% Injectable 12.5 Gram(s) IV Push once  epoetin reilly-epbx (RETACRIT) Injectable 80182 Unit(s) IV Push <User Schedule>  ferrous    sulfate Liquid 300 milliGRAM(s) Enteral Tube daily  glucagon  Injectable 1 milliGRAM(s) IntraMuscular once  hydrALAZINE 100 milliGRAM(s) Oral three times a day  insulin lispro (ADMELOG) corrective regimen sliding scale   SubCutaneous three times a day before meals  lidocaine   4% Patch 1 Patch Transdermal every 24 hours  lidocaine   4% Patch 1 Patch Transdermal every 24 hours  methyl salicylate 15%/menthol 10% Topical Cream 1 Application(s) Topical <User Schedule>  modafinil 50 milliGRAM(s) Oral <User Schedule>  Nephro-noam 1 Tablet(s) Oral daily  pantoprazole   Suspension 40 milliGRAM(s) Enteral Tube two times a day  polyethylene glycol 3350 17 Gram(s) Oral daily  senna 2 Tablet(s) Oral at bedtime    MEDICATIONS  (PRN):  acetaminophen   Oral Liquid .. 650 milliGRAM(s) Oral every 6 hours PRN Moderate Pain (4 - 6)  bisacodyl Suppository 10 milliGRAM(s) Rectal daily PRN Constipation  dextrose Oral Gel 15 Gram(s) Oral once PRN Blood Glucose LESS THAN 70 milliGRAM(s)/deciliter  melatonin 3 milliGRAM(s) Oral at bedtime PRN Insomnia                            9.2    8.62  )-----------( 336      ( 24 Jun 2024 14:45 )             29.4     06-24    132<L>  |  95<L>  |  52<H>  ----------------------------<  177<H>  3.9   |  28  |  6.23<H>    Ca    8.5      24 Jun 2024 14:45  Phos  4.6     06-24      Urinalysis Basic - ( 24 Jun 2024 14:45 )    Color: x / Appearance: x / SG: x / pH: x  Gluc: 177 mg/dL / Ketone: x  / Bili: x / Urobili: x   Blood: x / Protein: x / Nitrite: x   Leuk Esterase: x / RBC: x / WBC x   Sq Epi: x / Non Sq Epi: x / Bacteria: x        CAPILLARY BLOOD GLUCOSE      POCT Blood Glucose.: 174 mg/dL (24 Jun 2024 12:03)  POCT Blood Glucose.: 187 mg/dL (24 Jun 2024 08:18)  POCT Blood Glucose.: 197 mg/dL (23 Jun 2024 21:56)  POCT Blood Glucose.: 150 mg/dL (23 Jun 2024 17:28)      Vital Signs Last 24 Hrs  T(C): 36.4 (24 Jun 2024 17:04), Max: 37.1 (24 Jun 2024 14:00)  T(F): 97.6 (24 Jun 2024 17:04), Max: 98.7 (24 Jun 2024 14:00)  HR: 60 (24 Jun 2024 17:04) (55 - 66)  BP: 132/66 (24 Jun 2024 17:04) (132/66 - 177/68)  BP(mean): --  RR: 16 (24 Jun 2024 17:04) (16 - 18)  SpO2: 99% (24 Jun 2024 17:04) (98% - 99%)    Parameters below as of 24 Jun 2024 17:04  Patient On (Oxygen Delivery Method): nasal cannula  O2 Flow (L/min): 6  O2 Concentration (%): 28        Constitutional - NAD, Comfortable with PMV/ trach collar  HEENT - NCAT,   Neck - Supple, No limited ROM  Chest - good chest expansion, good respiratory effort  Cardio - warm and well perfused,   Abdomen -  Soft, NTND. + peg.   Extremities - No peripheral edema, No calf tenderness. RUE fistula.   Neurologic Exam:                    Cognitive -       Verbal Oriented X3 3/3 recall     Speech - Fluent, Comprehensible, No dysarthria, No aphasia      Cranial Nerves - No facial asymmetry, Tongue midline, Shoulder shrug intact no facial droop     Motor -                      LEFT    UE - ShAB 4/5, EF 4/5, EE 3/5, WE 4/5,  WNL                    RIGHT UE - ShAB 4/5, EF 4/5, EE 3/5, WE 4/5,  WNL                    LEFT    LE - HF 3/5, KE 4/5, DF 4/5, PF 4/5 Flexes BLES today- no pain                    RIGHT LE - HF 3/5, KE 4/5, DF 4/5, PF 4/5        Sensory - Intact to LT bilateral in face, arm and legs.      Reflexes - DTR 1 / 4 biceps symmetric. neg Latham's b/l, No clonus.  Psychiatric - Mood stable, Affect WNL  Skin on admission: no sacral pressure injuries.      IDT in [facility] 6/19  Tentative DC 7/9

## 2024-06-24 NOTE — PROGRESS NOTE ADULT - ASSESSMENT
73-year-old male w/ past medical history hypertension, ESRD (HD MWF)  insulin-dependent DM presented 4/29/24 to [facility] 4/29 with hiccups and demand ischemia, was treated with baclofen, developed what appears to be toxic encephalopathy. He then sustained a prolonged, complex hospital course over approx 6 weeks, notable for acute respiratory failure, intubation, sepsis, CRRT, DVT, GIB, trach and PEG placement, He is admitted for acute multidisciplinary rehab on 6/14/24 to [facility].     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  #Hospital Acquired Debility   #Dysphagia  #Gait, ADL, Functional impairments  - Comprehensive Multidisciplinary Rehab, PT/OT/ SLP 3 hr/day 5d/wk  - AC: Eliquis 5 BID for DVT, seen on CTA/P 5/12  - ASA81 qD  - Pain: PRN Tylenol, Lidocaine patch   - Absolute Avoidance of Baclofen   - NPO/Tube Feeds as below    #Acute Hypoxemic, Hypercapnic Respiratory Failure, s/p ETT intubation  #s/p tracheostomy   - Albuterol neb q6h   - Continue TC w/ ATC  tolerated 8 hrs capped  - Respiratory to follow in house  Pulmonary consulted- Xray- PMV/ capping per speech  PMV during the day    #ESRD on HD  #Fistula stenosis   #Anemia   - Continue HD M/W/F per renal   - Access is RUE AVF  - Regular monitoring of electrolytes  - EPOGEN w/ HD  - Iron supplementation, 300 mg daily   HD Today    #HTN  - Coreg 25 q12h  - Hydralazine 100 mg TID  - Norvasc 10 mg/D  - Isordil would be next agent added  BPs Stable    #NSTEMI, Demand Ischemia, resolved  - Initially, troponins mildly elevated  - TTE (4/30): EF 72, normal, no regional wall motion abnormalities   - No need to pursue cath at this time   - 20 mg lipitor at bedtime     #IDDM2, A1c 6.9  - NPH 4u q6h  - FS, ISS TIDAC  hypoglycemic over weekend  hospialist/ diabetic nursing adjusting insulin requirements    #R Common Iliac DVT  - Initially treated with heparin but then developed questionable GIB, transitioned to Eliquis   - IR was consulted, no plan for IVC filter  - Continue Eliquis 5 mg BID     #GI  - Concern melena 5/27, resolved, transfused   - GI consulted, not candidate for endoscopy due to surgical risk  - Concern for upper GI Bleed, GI felt not true bleed  - Continue PPI BID   - MIralax 17g daily  - Senna 2 @ bedtime   - Ducolax suppository daily PRN     #Oropharyngeal Dysphagia  #s/p PEG (5/17)  - Failed green dye 6/1   - Continue feeds: Card Steady 10 cc/hr incr. q6 goal 45cc/hr - feeds changed to Bolus- tolerating no signs of aspiration  MBS 6/20- cleared for minced moist with mild thicks  supplement PO with G feeds if needed      #Mood / Cognition:  - Neuropsych evaluation given prolonged and complex hospitalization  - Chart mentions concern for depression w/ possible suicidal ideation   tolerating Provigil    #/Bladder:  - Check urinary status on arrival, possibly anuric   spont voiding    #Dysphagia:    - Diet: tube feeds/ minced moist with mild thicks  SP MBS  - Ongoing SLP assessment  - Nutrition to follow    #Skin/ Pressure Injury Prevention:  - Assessment on admission   - Incisions:    #Precautions/ Restrictions  - Falls, Aspiration Precautions   - COVID PCR:

## 2024-06-24 NOTE — PROGRESS NOTE ADULT - SUBJECTIVE AND OBJECTIVE BOX
NEPHROLOGY PROGRESS NOTE    CHIEF COMPLAINT:  ESRD    HPI:  Seen on dialysis without complaints.  BP stable.  Access working well.    EXAM:  Vital Signs Last 24 Hrs  T(C): 36.8 (24 Jun 2024 07:21), Max: 36.8 (24 Jun 2024 07:21)  T(F): 98.2 (24 Jun 2024 07:21), Max: 98.2 (24 Jun 2024 07:21)  HR: 55 (24 Jun 2024 08:18) (55 - 66)  BP: 177/68 (24 Jun 2024 07:21) (134/72 - 177/68)  BP(mean): --  RR: 16 (24 Jun 2024 07:21) (16 - 18)  SpO2: 98% (24 Jun 2024 08:18) (98% - 99%)    Parameters below as of 24 Jun 2024 08:18  Patient On (Oxygen Delivery Method): tracheostomy collar      I&O's Summary    23 Jun 2024 07:01  -  24 Jun 2024 07:00  --------------------------------------------------------  IN: 1240 mL / OUT: 0 mL / NET: 1240 mL        Conversant, in no apparent distress  Normal respiratory effort, lungs clear bilaterally  Heart RRR with no murmur, no peripheral edema        A/P:    ESRD on HD MWF  S/p complicated hospital course as per HPI  S/p CVA, trach, PEG  Complete HD as ordered today with 1.5 liters UF  Epoetin w/HD 3 x week  Bld work w/each HD   Rehab    [phone]

## 2024-06-25 LAB
GLUCOSE BLDC GLUCOMTR-MCNC: 124 MG/DL — HIGH (ref 70–99)
GLUCOSE BLDC GLUCOMTR-MCNC: 152 MG/DL — HIGH (ref 70–99)
GLUCOSE BLDC GLUCOMTR-MCNC: 165 MG/DL — HIGH (ref 70–99)

## 2024-06-25 PROCEDURE — 99232 SBSQ HOSP IP/OBS MODERATE 35: CPT

## 2024-06-25 PROCEDURE — 99232 SBSQ HOSP IP/OBS MODERATE 35: CPT | Mod: GC

## 2024-06-25 RX ORDER — MODAFINIL 100 MG/1
50 TABLET ORAL
Refills: 0 | Status: DISCONTINUED | OUTPATIENT
Start: 2024-06-26 | End: 2024-06-26

## 2024-06-25 RX ADMIN — MENTHOL, METHYL SALICYLATE 1 APPLICATION(S): 63; 210 PATCH PERCUTANEOUS; TOPICAL; TRANSDERMAL at 06:04

## 2024-06-25 RX ADMIN — PANTOPRAZOLE SODIUM 40 MILLIGRAM(S): 40 INJECTION, POWDER, FOR SOLUTION INTRAVENOUS at 22:37

## 2024-06-25 RX ADMIN — Medication 300 MILLIGRAM(S): at 11:50

## 2024-06-25 RX ADMIN — APIXABAN 5 MILLIGRAM(S): 5 TABLET, FILM COATED ORAL at 22:35

## 2024-06-25 RX ADMIN — ATORVASTATIN CALCIUM 20 MILLIGRAM(S): 20 TABLET, FILM COATED ORAL at 22:36

## 2024-06-25 RX ADMIN — INSULIN LISPRO 1: 100 INJECTION, SOLUTION SUBCUTANEOUS at 11:50

## 2024-06-25 RX ADMIN — ASPIRIN 81 MILLIGRAM(S): 325 TABLET, FILM COATED ORAL at 11:51

## 2024-06-25 RX ADMIN — Medication 650 MILLIGRAM(S): at 12:30

## 2024-06-25 RX ADMIN — PANTOPRAZOLE SODIUM 40 MILLIGRAM(S): 40 INJECTION, POWDER, FOR SOLUTION INTRAVENOUS at 06:03

## 2024-06-25 RX ADMIN — MODAFINIL 50 MILLIGRAM(S): 100 TABLET ORAL at 12:31

## 2024-06-25 RX ADMIN — APIXABAN 5 MILLIGRAM(S): 5 TABLET, FILM COATED ORAL at 06:03

## 2024-06-25 RX ADMIN — LIDOCAINE HCL 1 PATCH: 28 GEL TOPICAL at 06:18

## 2024-06-25 RX ADMIN — INSULIN LISPRO 1: 100 INJECTION, SOLUTION SUBCUTANEOUS at 07:43

## 2024-06-25 RX ADMIN — Medication 2.5 MILLIGRAM(S): at 21:52

## 2024-06-25 RX ADMIN — MODAFINIL 50 MILLIGRAM(S): 100 TABLET ORAL at 06:02

## 2024-06-25 RX ADMIN — AMLODIPINE BESYLATE 10 MILLIGRAM(S): 2.5 TABLET ORAL at 06:02

## 2024-06-25 RX ADMIN — Medication 2.5 MILLIGRAM(S): at 03:24

## 2024-06-25 RX ADMIN — Medication 1 TABLET(S): at 11:51

## 2024-06-25 RX ADMIN — Medication 1 APPLICATION(S): at 06:04

## 2024-06-25 RX ADMIN — HYDRALAZINE HYDROCHLORIDE 100 MILLIGRAM(S): 50 TABLET ORAL at 14:48

## 2024-06-25 RX ADMIN — Medication 2.5 MILLIGRAM(S): at 15:58

## 2024-06-25 RX ADMIN — HYDRALAZINE HYDROCHLORIDE 100 MILLIGRAM(S): 50 TABLET ORAL at 22:35

## 2024-06-25 RX ADMIN — Medication 2.5 MILLIGRAM(S): at 08:24

## 2024-06-25 RX ADMIN — Medication 650 MILLIGRAM(S): at 11:51

## 2024-06-25 RX ADMIN — HYDRALAZINE HYDROCHLORIDE 100 MILLIGRAM(S): 50 TABLET ORAL at 06:02

## 2024-06-25 RX ADMIN — CARVEDILOL PHOSPHATE 25 MILLIGRAM(S): 80 CAPSULE, EXTENDED RELEASE ORAL at 22:36

## 2024-06-25 RX ADMIN — CARVEDILOL PHOSPHATE 25 MILLIGRAM(S): 80 CAPSULE, EXTENDED RELEASE ORAL at 06:02

## 2024-06-25 NOTE — PROVIDER CONTACT NOTE (OTHER) - ASSESSMENT
pt refusing all 6PM medications and refusing dinner. offered to give bolus feeding instead of PO- pt persistently refusing.

## 2024-06-25 NOTE — PROGRESS NOTE ADULT - SUBJECTIVE AND OBJECTIVE BOX
73-year-old male w/ past medical history hypertension, ESRD (HD MWF)  insulin-dependent DM presented 4/29/24 to [facility] with hiccups, chest pain of several days duration, admitted for concern demand ischemia. Early on, he was given baclofen for his hiccups, but soon developed AMS with 2 RRTs by 5/2/24, prompting intubation, upgrade to MICU. There was concern that baclofen toxicity was the culprit of his then-encephalopathic state, as patient had also missed a HD session due to a clotted fistula.     In coming days, he was treated empirically with 5 days of zosyn, had a negative CTH and LP, and had an EEG that did not capture a seizure but showed diffuse non-specific cerebral dysfunction - c/w toxic-metabolic encephalopathies. On 5/6, an MRI showed that the patient had sustained R pontine and cerebellar stroke. He was extubated on 5/9, but owing to a challenging extubation became septic w/ B Cereus, treated with Vanc. He had trach placed 5/14, PEG 5/17, then downgraded.     On the floors, he was followed by several services, including Neurology, who mention the strokes were embolic appearing. Subsequent hospital course notable for nonocclusive R common iliac DVT, for which he was started on a heparin drip which was stopped when patient developed an upper GI Bleed, though GI was consulted and felt he did not have an overt bleed. His dialysis fistula site was stenotic, s/p fistulogram, ultimately changed to a RUE AVF 6/5. He was ultimately started on Eliquis 6/7 for the DVT. Medically stable, he is admitted to [facility] on 6/14/24.     ROS/subjective:  patient seen and evaluated bedside  OOB to Chair tolerating PO  slept well  no complaints  trach capped- ?pulmonary to DC  tolerating Provigil  Knee pain better with arden sharma  Voiding, moving Bowels- last BM today 6/23  no dizziness, no headaches, no nausea, no vomiting, no SOB, no chest pain        MEDICATIONS  (STANDING):  albuterol    0.083% 2.5 milliGRAM(s) Nebulizer every 6 hours  amLODIPine   Tablet 10 milliGRAM(s) Oral daily  apixaban 5 milliGRAM(s) Enteral Tube two times a day  aspirin  chewable 81 milliGRAM(s) Oral daily  atorvastatin 20 milliGRAM(s) Oral at bedtime  carvedilol 25 milliGRAM(s) Oral every 12 hours  chlorhexidine 2% Cloths 1 Application(s) Topical <User Schedule>  dextrose 10% Bolus 125 milliLiter(s) IV Bolus once  dextrose 5%. 1000 milliLiter(s) (100 mL/Hr) IV Continuous <Continuous>  dextrose 5%. 1000 milliLiter(s) (50 mL/Hr) IV Continuous <Continuous>  dextrose 50% Injectable 25 Gram(s) IV Push once  dextrose 50% Injectable 12.5 Gram(s) IV Push once  epoetin reilly-epbx (RETACRIT) Injectable 69956 Unit(s) IV Push <User Schedule>  ferrous    sulfate Liquid 300 milliGRAM(s) Enteral Tube daily  glucagon  Injectable 1 milliGRAM(s) IntraMuscular once  hydrALAZINE 100 milliGRAM(s) Oral three times a day  insulin lispro (ADMELOG) corrective regimen sliding scale   SubCutaneous three times a day before meals  lidocaine   4% Patch 1 Patch Transdermal every 24 hours  lidocaine   4% Patch 1 Patch Transdermal every 24 hours  methyl salicylate 15%/menthol 10% Topical Cream 1 Application(s) Topical <User Schedule>  modafinil 50 milliGRAM(s) Oral <User Schedule>  Nephro-noam 1 Tablet(s) Oral daily  pantoprazole   Suspension 40 milliGRAM(s) Enteral Tube two times a day  polyethylene glycol 3350 17 Gram(s) Oral daily  senna 2 Tablet(s) Oral at bedtime    MEDICATIONS  (PRN):  acetaminophen   Oral Liquid .. 650 milliGRAM(s) Oral every 6 hours PRN Moderate Pain (4 - 6)  bisacodyl Suppository 10 milliGRAM(s) Rectal daily PRN Constipation  dextrose Oral Gel 15 Gram(s) Oral once PRN Blood Glucose LESS THAN 70 milliGRAM(s)/deciliter  melatonin 3 milliGRAM(s) Oral at bedtime PRN Insomnia                            9.2    8.62  )-----------( 336      ( 24 Jun 2024 14:45 )             29.4     06-24    132<L>  |  95<L>  |  52<H>  ----------------------------<  177<H>  3.9   |  28  |  6.23<H>    Ca    8.5      24 Jun 2024 14:45  Phos  4.6     06-24      Urinalysis Basic - ( 24 Jun 2024 14:45 )    Color: x / Appearance: x / SG: x / pH: x  Gluc: 177 mg/dL / Ketone: x  / Bili: x / Urobili: x   Blood: x / Protein: x / Nitrite: x   Leuk Esterase: x / RBC: x / WBC x   Sq Epi: x / Non Sq Epi: x / Bacteria: x        CAPILLARY BLOOD GLUCOSE      POCT Blood Glucose.: 165 mg/dL (25 Jun 2024 11:42)  POCT Blood Glucose.: 152 mg/dL (25 Jun 2024 07:40)  POCT Blood Glucose.: 127 mg/dL (24 Jun 2024 21:44)  POCT Blood Glucose.: 110 mg/dL (24 Jun 2024 17:48)      Vital Signs Last 24 Hrs  T(C): 37 (25 Jun 2024 07:28), Max: 37.1 (24 Jun 2024 14:00)  T(F): 98.6 (25 Jun 2024 07:28), Max: 98.7 (24 Jun 2024 14:00)  HR: 59 (25 Jun 2024 11:48) (55 - 64)  BP: 143/70 (25 Jun 2024 11:48) (106/57 - 146/72)  BP(mean): --  RR: 16 (25 Jun 2024 11:48) (16 - 18)  SpO2: 96% (25 Jun 2024 11:48) (95% - 99%)    Parameters below as of 25 Jun 2024 11:48  Patient On (Oxygen Delivery Method): capped trach      Constitutional - NAD, Comfortable with PMV/ trach collar  HEENT - NCAT,   Neck - Supple, No limited ROM  Chest - good chest expansion, good respiratory effort  Cardio - warm and well perfused,   Abdomen -  Soft, NTND. + peg.   Extremities - No peripheral edema, No calf tenderness. RUE fistula.   Neurologic Exam:                    Cognitive -       Verbal Oriented X3 3/3 recall     Speech - Fluent, Comprehensible, No dysarthria, No aphasia      Cranial Nerves - No facial asymmetry, Tongue midline, Shoulder shrug intact no facial droop     Motor -                      LEFT    UE - ShAB 4/5, EF 4/5, EE 3/5, WE 4/5,  WNL                    RIGHT UE - ShAB 4/5, EF 4/5, EE 3/5, WE 4/5,  WNL                    LEFT    LE - HF 3/5, KE 4/5, DF 4/5, PF 4/5 Flexes BLES today- no pain                    RIGHT LE - HF 3/5, KE 4/5, DF 4/5, PF 4/5        Sensory - Intact to LT bilateral in face, arm and legs.      Reflexes - DTR 1 / 4 biceps symmetric. neg Latham's b/l, No clonus.  Psychiatric - Mood stable, Affect WNL  Skin on admission: no sacral pressure injuries.      IDT in [facility] 6/19  Tentative DC 7/9

## 2024-06-25 NOTE — PROGRESS NOTE ADULT - ASSESSMENT
73M HTN, ESRD (HD MWF via AVG) DM who was admitted for unstable angina further c/b altered mental status requiring intubation with CRRT requriment, acute CVA vs baclofen toxicity, DVT and GIB s/p PEG/trach. Notable course for AV Fistula dysfunction requiring balloon angioplasty. Now in AR.     Ischemic CVA vs toxic encephalopathy  - continue holding baclofen  - ASA/Statin  - po tray plus Tube Feeds as below    DM with hypoglycemia 6/23,improved  - A1c 6.9  - lantus was discontinued yesterday due to FS in 50's  - continue sensitive sliding scale. FS have ranged from 100-180's today. if FS >200 start lantus 5units daily.  - continue tube feeds if he is not eating po    Acute Hypoxemic, Hypercapnic Respiratory Failure, s/p ETT intubation  #increased secretions, sialorrhea  - scopolamine patch ordered 6/23, monitor secretions.  - s/p tracheostomy   - Albuterol neb q6h   - Pulm reccs appreciated    ESRD on HD MWF via RT AFF  - stable, continue HD    HTN  - Coreg 25 q12h  - Hydralazine 100 mg TID  - Norvasc 10 mg/D    R Common Iliac DVT  - CT AP (5/12) with nonocclusive RIGHT common iliac vein deep venous thrombosis  - Continue Eliquis 5 mg BID     #Oropharyngeal Dysphagia  - s/p PEG (5/17)  - Continue TF    DVT PPX: Eliquis    discussed with primary team     73M HTN, ESRD (HD MWF via AVG) DM who was admitted for unstable angina further c/b altered mental status requiring intubation with CRRT requriment, acute CVA vs baclofen toxicity, DVT and GIB s/p PEG/trach. Notable course for AV Fistula dysfunction requiring balloon angioplasty. Now in AR.     Ischemic CVA vs toxic encephalopathy  - continue holding baclofen  - ASA/Statin  - po tray plus Tube Feeds as below    DM with hypoglycemia 6/23,improved  - A1c 6.9  - lantus was discontinued on 6/24  - continue ISS  - continue tube feeds if he is not eating po    Acute Hypoxemic, Hypercapnic Respiratory Failure, s/p ETT intubation  #increased secretions, sialorrhea  - scopolamine patch ordered 6/23, monitor secretions.  - s/p tracheostomy   - Albuterol neb q6h   - Pulm reccs appreciated    ESRD on HD MWF via RT AFF  - stable, continue HD    HTN  - Coreg 25 q12h  - Hydralazine 100 mg TID  - Norvasc 10 mg/D    R Common Iliac DVT  - CT AP (5/12) with nonocclusive RIGHT common iliac vein deep venous thrombosis  - Continue Eliquis 5 mg BID     #Oropharyngeal Dysphagia  - s/p PEG (5/17)  - Continue TF    DVT PPX: Eliquis

## 2024-06-25 NOTE — PROGRESS NOTE ADULT - SUBJECTIVE AND OBJECTIVE BOX
PROGRESS NOTE:     Patient is a 73y old  Male who presents with a chief complaint of CVA, Encephalopathy (24 Jun 2024 17:07)          SUBJECTIVE & OBJECTIVE:   Pt seen and examined at bedside in AM. agrees to secretion improving    no overnight events.       REVIEW OF SYSTEMS: limited    PHYSICAL EXAM:  VITALS:  Vital Signs Last 24 Hrs  T(C): 37 (25 Jun 2024 07:28), Max: 37.1 (24 Jun 2024 14:00)  T(F): 98.6 (25 Jun 2024 07:28), Max: 98.7 (24 Jun 2024 14:00)  HR: 60 (25 Jun 2024 09:15) (55 - 64)  BP: 106/57 (25 Jun 2024 07:28) (106/57 - 146/72)  BP(mean): --  RR: 16 (25 Jun 2024 07:28) (16 - 18)  SpO2: 98% (25 Jun 2024 09:15) (95% - 99%)    Parameters below as of 25 Jun 2024 09:15  Patient On (Oxygen Delivery Method): capped trach          GENERAL: NAD,  no increased WOB  HEAD:  Atraumatic, Normocephalic  EYES: EOMI,  conjunctiva and sclera clear  ENMT: Moist mucous membranes  NECK: Supple, trach in place  NERVOUS SYSTEM:  Alert  CHEST/LUNG: Clear to auscultation bilaterally; No rales, rhonchi, wheezin  HEART: Regular rate and rhythm  ABDOMEN: Soft, Nontender, Nondistended; Bowel sounds present  EXTREMITIES: No clubbing, cyanosis, calf tenderness or edema b/l      MEDICATIONS  (STANDING):  albuterol    0.083% 2.5 milliGRAM(s) Nebulizer every 6 hours  amLODIPine   Tablet 10 milliGRAM(s) Oral daily  apixaban 5 milliGRAM(s) Enteral Tube two times a day  aspirin  chewable 81 milliGRAM(s) Oral daily  atorvastatin 20 milliGRAM(s) Oral at bedtime  carvedilol 25 milliGRAM(s) Oral every 12 hours  chlorhexidine 2% Cloths 1 Application(s) Topical <User Schedule>  dextrose 10% Bolus 125 milliLiter(s) IV Bolus once  dextrose 5%. 1000 milliLiter(s) (100 mL/Hr) IV Continuous <Continuous>  dextrose 5%. 1000 milliLiter(s) (50 mL/Hr) IV Continuous <Continuous>  dextrose 50% Injectable 25 Gram(s) IV Push once  dextrose 50% Injectable 12.5 Gram(s) IV Push once  epoetin reilly-epbx (RETACRIT) Injectable 36601 Unit(s) IV Push <User Schedule>  ferrous    sulfate Liquid 300 milliGRAM(s) Enteral Tube daily  glucagon  Injectable 1 milliGRAM(s) IntraMuscular once  hydrALAZINE 100 milliGRAM(s) Oral three times a day  insulin lispro (ADMELOG) corrective regimen sliding scale   SubCutaneous three times a day before meals  lidocaine   4% Patch 1 Patch Transdermal every 24 hours  lidocaine   4% Patch 1 Patch Transdermal every 24 hours  methyl salicylate 15%/menthol 10% Topical Cream 1 Application(s) Topical <User Schedule>  modafinil 50 milliGRAM(s) Oral <User Schedule>  Nephro-noam 1 Tablet(s) Oral daily  pantoprazole   Suspension 40 milliGRAM(s) Enteral Tube two times a day  polyethylene glycol 3350 17 Gram(s) Oral daily  senna 2 Tablet(s) Oral at bedtime    MEDICATIONS  (PRN):  acetaminophen   Oral Liquid .. 650 milliGRAM(s) Oral every 6 hours PRN Moderate Pain (4 - 6)  bisacodyl Suppository 10 milliGRAM(s) Rectal daily PRN Constipation  dextrose Oral Gel 15 Gram(s) Oral once PRN Blood Glucose LESS THAN 70 milliGRAM(s)/deciliter  melatonin 3 milliGRAM(s) Oral at bedtime PRN Insomnia      Allergies    No Known Allergies    Intolerances              LABS:                           9.2    8.62  )-----------( 336      ( 24 Jun 2024 14:45 )             29.4     06-24    132<L>  |  95<L>  |  52<H>  ----------------------------<  177<H>  3.9   |  28  |  6.23<H>    Ca    8.5      24 Jun 2024 14:45  Phos  4.6     06-24        Urinalysis Basic - ( 24 Jun 2024 14:45 )    Color: x / Appearance: x / SG: x / pH: x  Gluc: 177 mg/dL / Ketone: x  / Bili: x / Urobili: x   Blood: x / Protein: x / Nitrite: x   Leuk Esterase: x / RBC: x / WBC x   Sq Epi: x / Non Sq Epi: x / Bacteria: x      CAPILLARY BLOOD GLUCOSE      POCT Blood Glucose.: 152 mg/dL (25 Jun 2024 07:40)  POCT Blood Glucose.: 127 mg/dL (24 Jun 2024 21:44)  POCT Blood Glucose.: 110 mg/dL (24 Jun 2024 17:48)  POCT Blood Glucose.: 174 mg/dL (24 Jun 2024 12:03)                RECENT CULTURES:          RADIOLOGY & ADDITIONAL TESTS:        Care Discussed with Consultants/Other Providers: Yes   PROGRESS NOTE:     Patient is a 73y old  Male who presents with a chief complaint of CVA, Encephalopathy (24 Jun 2024 17:07)          SUBJECTIVE & OBJECTIVE:   Pt seen and examined at bedside in AM. agrees to secretion improving    no overnight events.       REVIEW OF SYSTEMS: limited    PHYSICAL EXAM:  VITALS:  Vital Signs Last 24 Hrs  T(C): 37 (25 Jun 2024 07:28), Max: 37.1 (24 Jun 2024 14:00)  T(F): 98.6 (25 Jun 2024 07:28), Max: 98.7 (24 Jun 2024 14:00)  HR: 60 (25 Jun 2024 09:15) (55 - 64)  BP: 106/57 (25 Jun 2024 07:28) (106/57 - 146/72)  BP(mean): --  RR: 16 (25 Jun 2024 07:28) (16 - 18)  SpO2: 98% (25 Jun 2024 09:15) (95% - 99%)    Parameters below as of 25 Jun 2024 09:15  Patient On (Oxygen Delivery Method): capped trach          GENERAL: NAD,  no increased WOB  HEAD:  Atraumatic, Normocephalic  EYES: EOMI,  conjunctiva and sclera clear  ENMT: Moist mucous membranes  NECK: Supple, trach in place  NERVOUS SYSTEM:  Alert  CHEST/LUNG: Clear to auscultation bilaterally; No rales, rhonchi, wheezing  HEART: Regular rate and rhythm  ABDOMEN: Soft, Nontender, Nondistended; Bowel sounds present  EXTREMITIES: No clubbing, cyanosis, calf tenderness or edema b/l      MEDICATIONS  (STANDING):  albuterol    0.083% 2.5 milliGRAM(s) Nebulizer every 6 hours  amLODIPine   Tablet 10 milliGRAM(s) Oral daily  apixaban 5 milliGRAM(s) Enteral Tube two times a day  aspirin  chewable 81 milliGRAM(s) Oral daily  atorvastatin 20 milliGRAM(s) Oral at bedtime  carvedilol 25 milliGRAM(s) Oral every 12 hours  chlorhexidine 2% Cloths 1 Application(s) Topical <User Schedule>  dextrose 10% Bolus 125 milliLiter(s) IV Bolus once  dextrose 5%. 1000 milliLiter(s) (100 mL/Hr) IV Continuous <Continuous>  dextrose 5%. 1000 milliLiter(s) (50 mL/Hr) IV Continuous <Continuous>  dextrose 50% Injectable 25 Gram(s) IV Push once  dextrose 50% Injectable 12.5 Gram(s) IV Push once  epoetin reilly-epbx (RETACRIT) Injectable 54518 Unit(s) IV Push <User Schedule>  ferrous    sulfate Liquid 300 milliGRAM(s) Enteral Tube daily  glucagon  Injectable 1 milliGRAM(s) IntraMuscular once  hydrALAZINE 100 milliGRAM(s) Oral three times a day  insulin lispro (ADMELOG) corrective regimen sliding scale   SubCutaneous three times a day before meals  lidocaine   4% Patch 1 Patch Transdermal every 24 hours  lidocaine   4% Patch 1 Patch Transdermal every 24 hours  methyl salicylate 15%/menthol 10% Topical Cream 1 Application(s) Topical <User Schedule>  modafinil 50 milliGRAM(s) Oral <User Schedule>  Nephro-noam 1 Tablet(s) Oral daily  pantoprazole   Suspension 40 milliGRAM(s) Enteral Tube two times a day  polyethylene glycol 3350 17 Gram(s) Oral daily  senna 2 Tablet(s) Oral at bedtime    MEDICATIONS  (PRN):  acetaminophen   Oral Liquid .. 650 milliGRAM(s) Oral every 6 hours PRN Moderate Pain (4 - 6)  bisacodyl Suppository 10 milliGRAM(s) Rectal daily PRN Constipation  dextrose Oral Gel 15 Gram(s) Oral once PRN Blood Glucose LESS THAN 70 milliGRAM(s)/deciliter  melatonin 3 milliGRAM(s) Oral at bedtime PRN Insomnia      Allergies    No Known Allergies    Intolerances              LABS:                           9.2    8.62  )-----------( 336      ( 24 Jun 2024 14:45 )             29.4     06-24    132<L>  |  95<L>  |  52<H>  ----------------------------<  177<H>  3.9   |  28  |  6.23<H>    Ca    8.5      24 Jun 2024 14:45  Phos  4.6     06-24        Urinalysis Basic - ( 24 Jun 2024 14:45 )    Color: x / Appearance: x / SG: x / pH: x  Gluc: 177 mg/dL / Ketone: x  / Bili: x / Urobili: x   Blood: x / Protein: x / Nitrite: x   Leuk Esterase: x / RBC: x / WBC x   Sq Epi: x / Non Sq Epi: x / Bacteria: x      CAPILLARY BLOOD GLUCOSE      POCT Blood Glucose.: 152 mg/dL (25 Jun 2024 07:40)  POCT Blood Glucose.: 127 mg/dL (24 Jun 2024 21:44)  POCT Blood Glucose.: 110 mg/dL (24 Jun 2024 17:48)  POCT Blood Glucose.: 174 mg/dL (24 Jun 2024 12:03)                RECENT CULTURES:          RADIOLOGY & ADDITIONAL TESTS:        Care Discussed with Consultants/Other Providers: Yes

## 2024-06-25 NOTE — PROGRESS NOTE ADULT - SUBJECTIVE AND OBJECTIVE BOX
Time of Visit:  Patient seen and examined. faiza berto earlier today , + PMV with O2 28% TC and wife at bedside    MEDICATIONS  (STANDING):  albuterol    0.083% 2.5 milliGRAM(s) Nebulizer every 6 hours  amLODIPine   Tablet 10 milliGRAM(s) Oral daily  apixaban 5 milliGRAM(s) Enteral Tube two times a day  aspirin  chewable 81 milliGRAM(s) Oral daily  atorvastatin 20 milliGRAM(s) Oral at bedtime  carvedilol 25 milliGRAM(s) Oral every 12 hours  chlorhexidine 2% Cloths 1 Application(s) Topical <User Schedule>  dextrose 10% Bolus 125 milliLiter(s) IV Bolus once  dextrose 5%. 1000 milliLiter(s) (100 mL/Hr) IV Continuous <Continuous>  dextrose 5%. 1000 milliLiter(s) (50 mL/Hr) IV Continuous <Continuous>  dextrose 50% Injectable 25 Gram(s) IV Push once  dextrose 50% Injectable 12.5 Gram(s) IV Push once  epoetin reilly-epbx (RETACRIT) Injectable 03866 Unit(s) IV Push <User Schedule>  ferrous    sulfate Liquid 300 milliGRAM(s) Enteral Tube daily  glucagon  Injectable 1 milliGRAM(s) IntraMuscular once  hydrALAZINE 100 milliGRAM(s) Oral three times a day  insulin lispro (ADMELOG) corrective regimen sliding scale   SubCutaneous three times a day before meals  lidocaine   4% Patch 1 Patch Transdermal every 24 hours  lidocaine   4% Patch 1 Patch Transdermal every 24 hours  methyl salicylate 15%/menthol 10% Topical Cream 1 Application(s) Topical <User Schedule>  modafinil 50 milliGRAM(s) Oral <User Schedule>  Nephro-noam 1 Tablet(s) Oral daily  pantoprazole   Suspension 40 milliGRAM(s) Enteral Tube two times a day  polyethylene glycol 3350 17 Gram(s) Oral daily  senna 2 Tablet(s) Oral at bedtime      MEDICATIONS  (PRN):  acetaminophen   Oral Liquid .. 650 milliGRAM(s) Oral every 6 hours PRN Moderate Pain (4 - 6)  bisacodyl Suppository 10 milliGRAM(s) Rectal daily PRN Constipation  dextrose Oral Gel 15 Gram(s) Oral once PRN Blood Glucose LESS THAN 70 milliGRAM(s)/deciliter  melatonin 3 milliGRAM(s) Oral at bedtime PRN Insomnia       Medications up to date at time of exam.      PHYSICAL EXAMINATION:  Patient has no new complaints.  GENERAL: The patient  in no apparent distress.     Vital Signs Last 24 Hrs  T(C): 37 (25 Jun 2024 07:28), Max: 37 (25 Jun 2024 07:28)  T(F): 98.6 (25 Jun 2024 07:28), Max: 98.6 (25 Jun 2024 07:28)  HR: 61 (25 Jun 2024 17:57) (52 - 64)  BP: 154/71 (25 Jun 2024 17:57) (106/57 - 154/71)  BP(mean): --  RR: 16 (25 Jun 2024 17:57) (16 - 18)  SpO2: 96% (25 Jun 2024 17:57) (95% - 99%)    Parameters below as of 25 Jun 2024 17:57  Patient On (Oxygen Delivery Method): capped trach       (if applicable)    Chest Tube (if applicable)    HEENT: Head is normocephalic and atraumatic. Extraocular muscles are intact. Mucous membranes are moist.     NECK: Supple, no palpable adenopathy.+ trach .+ PMV .no discharge from stoma     LUNGS: Fair bilateral air entry   no wheezing, rales, or rhonchi.    HEART: Regular rate and rhythm without murmur.    ABDOMEN: Soft, nontender, and nondistended.  No hepatosplenomegaly is noted. + PEG     : No painful voiding, no pelvic pain    EXTREMITIES: Without any cyanosis, clubbing, rash, lesions or edema.    NEUROLOGIC: open eyes to voice     SKIN: Warm, dry, good turgor.      LABS:                        9.2    8.62  )-----------( 336      ( 24 Jun 2024 14:45 )             29.4     06-24    132<L>  |  95<L>  |  52<H>  ----------------------------<  177<H>  3.9   |  28  |  6.23<H>    Ca    8.5      24 Jun 2024 14:45  Phos  4.6     06-24        Urinalysis Basic - ( 24 Jun 2024 14:45 )    Color: x / Appearance: x / SG: x / pH: x  Gluc: 177 mg/dL / Ketone: x  / Bili: x / Urobili: x   Blood: x / Protein: x / Nitrite: x   Leuk Esterase: x / RBC: x / WBC x   Sq Epi: x / Non Sq Epi: x / Bacteria: x        MICROBIOLOGY: (if applicable)    RADIOLOGY & ADDITIONAL STUDIES:  EKG:   CXR:  ECHO:    IMPRESSION: 73y Male PAST MEDICAL & SURGICAL HISTORY:  Diabetes      Benign essential HTN      HLD (hyperlipidemia)      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      ESRD on hemodialysis      Arteriovenous fistula       p/w      IMP: This is a 73 yr old man with HTN, ESRD (HD MWF via AVG) DM who was admitted for unstable angina further c/b altered mental status requiring intubation with CRRT requriment, acute CVA vs baclofen toxicity, DVT and GIB s/p PEG/trach. Notable course for AV Fistula dysfunction requiring balloon angioplasty. Pat trach is cap  during the day while awake as per speech path       Assessment  1. Chronic Respiratory failure s/p Trach  2. AMS appears to have improved , ? Baclofen toxicity vs CVA  3. ESRD on HD, DM 2,   4. Right iliac DVT on Eliquis      Plan  - Continue to cap trach during the day while awake  as tolerate with speech path monitoring   - Oral suction , minimize trach suction   - Duoneb q6h   - O2 supp at 28 % TC to maintain sat >90%  - PEG feed   - Neph for HD   - Scopolamine patch     spoke with wife at bedside and speech path at bedside

## 2024-06-25 NOTE — PROGRESS NOTE ADULT - ASSESSMENT
73-year-old male w/ past medical history hypertension, ESRD (HD MWF)  insulin-dependent DM presented 4/29/24 to [facility] 4/29 with hiccups and demand ischemia, was treated with baclofen, developed what appears to be toxic encephalopathy. He then sustained a prolonged, complex hospital course over approx 6 weeks, notable for acute respiratory failure, intubation, sepsis, CRRT, DVT, GIB, trach and PEG placement, He is admitted for acute multidisciplinary rehab on 6/14/24 to [facility].     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 Pulmonary to DC  - Respiratory to follow in house    #ESRD on HD  #Fistula stenosis   #Anemia   - Continue HD M/W/F per renal   - Access is RUE AVF  - Regular monitoring of electrolytes  - EPOGEN w/ HD  - Iron supplementation, 300 mg daily   HD     #HTN  - Coreg 25 q12h  - Hydralazine 100 mg TID  - Norvasc 10 mg/D  - Isordil would be next agent added  BPs Stable    #NSTEMI, Demand Ischemia, resolved  - Initially, troponins mildly elevated  - TTE (4/30): EF 72, normal, no regional wall motion abnormalities   - No need to pursue cath at this time   - 20 mg lipitor at bedtime     #IDDM2, A1c 6.9  - NPH 4u q6h  - FS, ISS TIDAC  Glucoses OK with coverage    #R Common Iliac DVT  - Initially treated with heparin but then developed questionable GIB, transitioned to Eliquis   - IR was consulted, no plan for IVC filter  - Continue Eliquis 5 mg BID     #GI  - Concern melena 5/27, resolved, transfused   - GI consulted, not candidate for endoscopy due to surgical risk  - Concern for upper GI Bleed, GI felt not true bleed  - Continue PPI BID   - MIralax 17g daily  - Senna 2 @ bedtime   - Ducolax suppository daily PRN     #Oropharyngeal Dysphagia  #s/p PEG (5/17)  - Failed green dye 6/1   - Continue feeds: Card Steady 10 cc/hr incr. q6 goal 45cc/hr - feeds changed to Bolus- tolerating no signs of aspiration  MBS 6/20- cleared for minced moist with mild thicks  supplement PO with G feeds if needed      #Mood / Cognition:  - Neuropsych evaluation given prolonged and complex hospitalization  - Chart mentions concern for depression w/ possible suicidal ideation   tolerating Provigil    #/Bladder:  - Check urinary status on arrival, possibly anuric   spont voiding    #Dysphagia:    - Diet: tube feeds/ minced moist with mild thicks  SP MBS  - Ongoing SLP assessment  - Nutrition to follow    #Skin/ Pressure Injury Prevention:  - Assessment on admission   - Incisions:    #Precautions/ Restrictions  - Falls, Aspiration Precautions   - COVID PCR:

## 2024-06-26 LAB
CULTURE RESULTS: SIGNIFICANT CHANGE UP
GLUCOSE BLDC GLUCOMTR-MCNC: 102 MG/DL — HIGH (ref 70–99)
GLUCOSE BLDC GLUCOMTR-MCNC: 172 MG/DL — HIGH (ref 70–99)
GLUCOSE BLDC GLUCOMTR-MCNC: 205 MG/DL — HIGH (ref 70–99)
GLUCOSE BLDC GLUCOMTR-MCNC: 208 MG/DL — HIGH (ref 70–99)
HCT VFR BLD CALC: 27.7 % — LOW (ref 39–50)
HGB BLD-MCNC: 8.9 G/DL — LOW (ref 13–17)
MCHC RBC-ENTMCNC: 23 PG — LOW (ref 27–34)
MCHC RBC-ENTMCNC: 32.1 GM/DL — SIGNIFICANT CHANGE UP (ref 32–36)
MCV RBC AUTO: 71.6 FL — LOW (ref 80–100)
NRBC # BLD: 0 /100 WBCS — SIGNIFICANT CHANGE UP (ref 0–0)
PLATELET # BLD AUTO: 286 K/UL — SIGNIFICANT CHANGE UP (ref 150–400)
RBC # BLD: 3.87 M/UL — LOW (ref 4.2–5.8)
RBC # FLD: 21.7 % — HIGH (ref 10.3–14.5)
SPECIMEN SOURCE: SIGNIFICANT CHANGE UP
WBC # BLD: 7.88 K/UL — SIGNIFICANT CHANGE UP (ref 3.8–10.5)
WBC # FLD AUTO: 7.88 K/UL — SIGNIFICANT CHANGE UP (ref 3.8–10.5)

## 2024-06-26 PROCEDURE — 90791 PSYCH DIAGNOSTIC EVALUATION: CPT

## 2024-06-26 PROCEDURE — 90792 PSYCH DIAG EVAL W/MED SRVCS: CPT

## 2024-06-26 PROCEDURE — 99232 SBSQ HOSP IP/OBS MODERATE 35: CPT

## 2024-06-26 PROCEDURE — 99233 SBSQ HOSP IP/OBS HIGH 50: CPT | Mod: GC

## 2024-06-26 RX ADMIN — INSULIN LISPRO 2: 100 INJECTION, SOLUTION SUBCUTANEOUS at 12:07

## 2024-06-26 RX ADMIN — MODAFINIL 50 MILLIGRAM(S): 100 TABLET ORAL at 05:58

## 2024-06-26 RX ADMIN — MENTHOL, METHYL SALICYLATE 1 APPLICATION(S): 63; 210 PATCH PERCUTANEOUS; TOPICAL; TRANSDERMAL at 05:59

## 2024-06-26 RX ADMIN — Medication 12.5 MILLIGRAM(S): at 21:44

## 2024-06-26 RX ADMIN — PANTOPRAZOLE SODIUM 40 MILLIGRAM(S): 40 INJECTION, POWDER, FOR SOLUTION INTRAVENOUS at 05:58

## 2024-06-26 RX ADMIN — Medication 2 TABLET(S): at 21:43

## 2024-06-26 RX ADMIN — Medication 2.5 MILLIGRAM(S): at 05:01

## 2024-06-26 RX ADMIN — Medication 300 MILLIGRAM(S): at 11:22

## 2024-06-26 RX ADMIN — ERYTHROPOIETIN 10000 UNIT(S): 4000 INJECTION, SOLUTION INTRAVENOUS; SUBCUTANEOUS at 05:54

## 2024-06-26 RX ADMIN — MENTHOL, METHYL SALICYLATE 1 APPLICATION(S): 63; 210 PATCH PERCUTANEOUS; TOPICAL; TRANSDERMAL at 13:12

## 2024-06-26 RX ADMIN — Medication 2.5 MILLIGRAM(S): at 22:01

## 2024-06-26 RX ADMIN — PANTOPRAZOLE SODIUM 40 MILLIGRAM(S): 40 INJECTION, POWDER, FOR SOLUTION INTRAVENOUS at 18:50

## 2024-06-26 RX ADMIN — AMLODIPINE BESYLATE 10 MILLIGRAM(S): 2.5 TABLET ORAL at 05:55

## 2024-06-26 RX ADMIN — APIXABAN 5 MILLIGRAM(S): 5 TABLET, FILM COATED ORAL at 18:50

## 2024-06-26 RX ADMIN — Medication 2.5 MILLIGRAM(S): at 07:46

## 2024-06-26 RX ADMIN — ATORVASTATIN CALCIUM 20 MILLIGRAM(S): 20 TABLET, FILM COATED ORAL at 21:42

## 2024-06-26 RX ADMIN — CARVEDILOL PHOSPHATE 25 MILLIGRAM(S): 80 CAPSULE, EXTENDED RELEASE ORAL at 05:55

## 2024-06-26 RX ADMIN — HYDRALAZINE HYDROCHLORIDE 100 MILLIGRAM(S): 50 TABLET ORAL at 21:42

## 2024-06-26 RX ADMIN — ASPIRIN 81 MILLIGRAM(S): 325 TABLET, FILM COATED ORAL at 11:22

## 2024-06-26 RX ADMIN — HYDRALAZINE HYDROCHLORIDE 100 MILLIGRAM(S): 50 TABLET ORAL at 18:51

## 2024-06-26 RX ADMIN — HYDRALAZINE HYDROCHLORIDE 100 MILLIGRAM(S): 50 TABLET ORAL at 05:55

## 2024-06-26 RX ADMIN — APIXABAN 5 MILLIGRAM(S): 5 TABLET, FILM COATED ORAL at 05:55

## 2024-06-26 RX ADMIN — Medication 1 APPLICATION(S): at 05:58

## 2024-06-26 RX ADMIN — Medication 1 TABLET(S): at 11:22

## 2024-06-26 RX ADMIN — INSULIN LISPRO 1: 100 INJECTION, SOLUTION SUBCUTANEOUS at 07:58

## 2024-06-26 NOTE — CONSULT NOTE ADULT - SUBJECTIVE AND OBJECTIVE BOX
Patient seen alone at bedside for 30-minute psychology consultation. Neuropsychologist is introduced as a member of the rehabilitation team and the purpose of the session is explained. Patient agrees to participate.     Per patient, "I came here for treatment, I don't know why I was at the hospital."     Medical records are reviewed. Per records: 73-year-old, male, w/ past medical history hypertension, ESRD (HD MWF)  insulin-dependent DM presented 4/29/24 to [facility] 4/29 with hiccups and demand ischemia, was treated with baclofen, developed what appears to be toxic encephalopathy. He then sustained a prolonged, complex hospital course over approx 6 weeks, notable for acute respiratory failure, intubation, sepsis, CRRT, DVT, GIB, trach and PEG placement, He is admitted for acute multidisciplinary rehab on 6/14/24 to [facility].

## 2024-06-26 NOTE — BH CONSULTATION LIAISON ASSESSMENT NOTE - NSBHCHARTREVIEWLAB_PSY_A_CORE FT
8/2/2017      Lionel Rodriguez  [address] [facility] [address]      Dear Lionel    The following are instructions for your procedure.  If you have any questions, please contact our office.    Your procedure is scheduled at:   [id number] [place] [address]    Your procedure is scheduled on August 9, 2017 at 5:30 pm.  You need to arrive at the hospital at 2:30 pm.    When you arrive at the hospital, go to the main entrance. Take the garden tower elevators, near the main entrance, down to the first floor and follow the signs. You’ll be checking in at the [facility]. This is located between the [employer] shop and the [facility].    You may have breakfast, but this must be eaten by 8:00am. Then, you can have clear liquids until 12:30pm. You may also take your medications the day of your procedure with sips of water except for the following:    - HOLD: Lisinopril on the day of your procedure     Bring a current list of all your medications as well as your insurance card(s).  Please pack and bring an overnight bag as you may be kept overnight. You will also need to make arrangements for a return ride home.    Again, after reviewing all the information, if you have any questions, please feel free to contact our office.    Sincerely,    Gayathri RAZO  [phone]  Clinical  to Dr. Paty Flores MD, FACC, [facility], [facility]  
Complete Blood Count (06.24.24 @ 14:45)   Nucleated RBC: 0 /100 WBCs  WBC Count: 8.62 K/uL  RBC Count: 4.10 M/uL  Hemoglobin: 9.2 g/dL  Hematocrit: 29.4 %  Mean Cell Volume: 71.7 fl  Mean Cell Hemoglobin: 22.4 pg  Mean Cell Hemoglobin Conc: 31.3 gm/dL  Red Cell Distrib Width: 22.3 %  Platelet Count - Automated: 336 K/uL

## 2024-06-26 NOTE — BH CONSULTATION LIAISON ASSESSMENT NOTE - NSBHCHARTREVIEWVS_PSY_A_CORE FT
Vital Signs Last 24 Hrs  T(C): 36.3 (26 Jun 2024 08:44), Max: 36.4 (25 Jun 2024 20:00)  T(F): 97.4 (26 Jun 2024 08:44), Max: 97.6 (25 Jun 2024 20:00)  HR: 53 (26 Jun 2024 08:44) (53 - 61)  BP: 107/57 (26 Jun 2024 08:44) (107/57 - 154/71)  BP(mean): --  RR: 17 (26 Jun 2024 08:44) (16 - 18)  SpO2: 97% (26 Jun 2024 08:44) (96% - 98%)    Parameters below as of 26 Jun 2024 16:06  Patient On (Oxygen Delivery Method): Patient on the PMV

## 2024-06-26 NOTE — BH CONSULTATION LIAISON ASSESSMENT NOTE - HPI (INCLUDE ILLNESS QUALITY, SEVERITY, DURATION, TIMING, CONTEXT, MODIFYING FACTORS, ASSOCIATED SIGNS AND SYMPTOMS)
73M HTN, ESRD (HD MWF via AVG) DM who was admitted for unstable angina further c/b altered mental status requiring intubation with CRRT requriment, acute CVA vs baclofen toxicity, DVT and GIB s/p PEG/trach. Notable course for AV Fistula dysfunction requiring balloon angioplasty. Now in [place]. Psychiatry consulted for hallucinations.     C/L Psychiatry Note:  Chart reviewed and patient evaluated. Patient was initially observed interacting with his physical therapist, expressing that he did not feel up to participating in his afternoon therapy session due to fatigue from a sleepless night. He informed the therapist that he was unable to sleep due to bed bugs. He describes that he was bitten mostly on his butt and genital area. He reports to his physical therapist, "they almost killed me last night. I came for therapy and I am too tired." Upon evaluation, patient is AAOx4, irritable, and in good behavioral control. Patient denies any overt feelings of depression and anxiety, stating that he is recovering well. Patient ruminating and preoccupied about his bed bugs throughout the exam, stating "I'm scared to go to bed." He exhibits impaired reasoning, asserting that the bed bugs are located in the corners of the bed, making them difficult to see. Despite reassurance and confirmation from nursing staff that the bed was thoroughly checked and the sheets were changed with no evidence of bed bugs found, the patient remains adamant that the bugs are hidden and will reemerge at night. Patient denies any prior history of experiencing similar sensations. He reports being bitten primarily on his buttocks and between his legs, with additional bites on his arms. The patient describes the bed bugs as small and running all over the place, noting two colors: brown and white. He claims that the brown bugs are "sucking out all of my blood."    Per Neuropsychology assessment, patient with delusions including believing the room was evacuated by staff, police and hazmat were on the way, and that bed bugs were still in the room even though they could not be seen.

## 2024-06-26 NOTE — PROGRESS NOTE ADULT - ASSESSMENT
73-year-old male w/ past medical history hypertension, ESRD (HD MWF)  insulin-dependent DM presented 4/29/24 to [facility] 4/29 with hiccups and demand ischemia, was treated with baclofen, developed what appears to be toxic encephalopathy. He then sustained a prolonged, complex hospital course over approx 6 weeks, notable for acute respiratory failure, intubation, sepsis, CRRT, DVT, GIB, trach and PEG placement, He is admitted for acute multidisciplinary rehab on 6/14/24 to [facility].     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 Pulmonary to DC  - Respiratory to follow in house    #ESRD on HD  #Fistula stenosis   #Anemia   - Continue HD M/W/F per renal   - Access is RUE AVF  - Regular monitoring of electrolytes  - EPOGEN w/ HD  - Iron supplementation, 300 mg daily   HD     #HTN  - Coreg 25 q12h  - Hydralazine 100 mg TID  - Norvasc 10 mg/D  - Isordil would be next agent added  BPs Stable    #NSTEMI, Demand Ischemia, resolved  - Initially, troponins mildly elevated  - TTE (4/30): EF 72, normal, no regional wall motion abnormalities   - No need to pursue cath at this time   - 20 mg lipitor at bedtime     #IDDM2, A1c 6.9  - NPH 4u q6h  - FS, ISS TIDAC  Glucoses OK with coverage    #R Common Iliac DVT  - Initially treated with heparin but then developed questionable GIB, transitioned to Eliquis   - IR was consulted, no plan for IVC filter  - Continue Eliquis 5 mg BID     #GI  - Concern melena 5/27, resolved, transfused   - GI consulted, not candidate for endoscopy due to surgical risk  - Concern for upper GI Bleed, GI felt not true bleed  - Continue PPI BID   - MIralax 17g daily  - Senna 2 @ bedtime   - Ducolax suppository daily PRN     #Oropharyngeal Dysphagia  #s/p PEG (5/17)  - Failed green dye 6/1   - Continue feeds: Card Steady 10 cc/hr incr. q6 goal 45cc/hr - feeds changed to Bolus- tolerating no signs of aspiration  MBS 6/20- cleared for minced moist with mild thicks  supplement PO with G feeds if needed  speech to advance diet      #Mood / Cognition:  - Neuropsych evaluation given prolonged and complex hospitalization  - Chart mentions concern for depression w/ possible suicidal ideation   tolerating Provigil  Acute hallucinations overnight- Provigil DCd- Psych saw patient- acute delirium - seroquel HS  assess for antidepressant as well?    #/Bladder:  - Check urinary status on arrival, possibly anuric   spont voiding    #Dysphagia:    - Diet: tube feeds/ minced moist with mild thicks  SP MBS  - Ongoing SLP assessment- speech to advance diet  - Nutrition to follow    #Skin/ Pressure Injury Prevention:  - Assessment on admission   - Incisions:    #Precautions/ Restrictions  - Falls, Aspiration Precautions   - COVID PCR:     50 total minutes spent on today's encounter including team conference as well as phone conversation with patient's wife Pauline - I updated her on plan of care- All questions were answered by me as well.

## 2024-06-26 NOTE — PROGRESS NOTE ADULT - SUBJECTIVE AND OBJECTIVE BOX
PROGRESS NOTE:     Patient is a 73y old  Male who presents with a chief complaint of CVA, Encephalopathy (24 Jun 2024 17:07)          SUBJECTIVE & OBJECTIVE:   Pt seen and examined at bedside in AM.   no overnight events.       REVIEW OF SYSTEMS: limited    PHYSICAL EXAM:  VITALS:  Vital Signs Last 24 Hrs  T(C): 36.3 (26 Jun 2024 08:44), Max: 36.4 (25 Jun 2024 20:00)  T(F): 97.4 (26 Jun 2024 08:44), Max: 97.6 (25 Jun 2024 20:00)  HR: 53 (26 Jun 2024 08:44) (52 - 61)  BP: 107/57 (26 Jun 2024 08:44) (107/57 - 154/71)  BP(mean): --  RR: 17 (26 Jun 2024 08:44) (16 - 18)  SpO2: 97% (26 Jun 2024 08:44) (96% - 98%)    Parameters below as of 26 Jun 2024 08:44  Patient On (Oxygen Delivery Method): capped trach          GENERAL: NAD,  no increased WOB  HEAD:  Atraumatic, Normocephalic  EYES: EOMI,  conjunctiva and sclera clear  ENMT: Moist mucous membranes  NECK: Supple, capped trach  NERVOUS SYSTEM:  Alert  CHEST/LUNG: Clear to auscultation bilaterally; No rales, rhonchi, wheezing  HEART: Regular rate and rhythm  ABDOMEN: Soft, Nontender, Nondistended; Bowel sounds present  EXTREMITIES: No clubbing, cyanosis, calf tenderness or edema b/l    MEDICATIONS  (STANDING):  albuterol    0.083% 2.5 milliGRAM(s) Nebulizer every 6 hours  amLODIPine   Tablet 10 milliGRAM(s) Oral daily  apixaban 5 milliGRAM(s) Enteral Tube two times a day  aspirin  chewable 81 milliGRAM(s) Oral daily  atorvastatin 20 milliGRAM(s) Oral at bedtime  carvedilol 25 milliGRAM(s) Oral every 12 hours  chlorhexidine 2% Cloths 1 Application(s) Topical <User Schedule>  dextrose 10% Bolus 125 milliLiter(s) IV Bolus once  dextrose 5%. 1000 milliLiter(s) (100 mL/Hr) IV Continuous <Continuous>  dextrose 5%. 1000 milliLiter(s) (50 mL/Hr) IV Continuous <Continuous>  dextrose 50% Injectable 25 Gram(s) IV Push once  dextrose 50% Injectable 12.5 Gram(s) IV Push once  epoetin reilly-epbx (RETACRIT) Injectable 70088 Unit(s) IV Push <User Schedule>  ferrous    sulfate Liquid 300 milliGRAM(s) Enteral Tube daily  glucagon  Injectable 1 milliGRAM(s) IntraMuscular once  hydrALAZINE 100 milliGRAM(s) Oral three times a day  insulin lispro (ADMELOG) corrective regimen sliding scale   SubCutaneous three times a day before meals  lidocaine   4% Patch 1 Patch Transdermal every 24 hours  lidocaine   4% Patch 1 Patch Transdermal every 24 hours  methyl salicylate 15%/menthol 10% Topical Cream 1 Application(s) Topical <User Schedule>  Nephro-noam 1 Tablet(s) Oral daily  pantoprazole   Suspension 40 milliGRAM(s) Enteral Tube two times a day  polyethylene glycol 3350 17 Gram(s) Oral daily  senna 2 Tablet(s) Oral at bedtime    MEDICATIONS  (PRN):  acetaminophen   Oral Liquid .. 650 milliGRAM(s) Oral every 6 hours PRN Moderate Pain (4 - 6)  bisacodyl Suppository 10 milliGRAM(s) Rectal daily PRN Constipation  dextrose Oral Gel 15 Gram(s) Oral once PRN Blood Glucose LESS THAN 70 milliGRAM(s)/deciliter  melatonin 3 milliGRAM(s) Oral at bedtime PRN Insomnia      Allergies    No Known Allergies    Intolerances              LABS:                           9.2    8.62  )-----------( 336      ( 24 Jun 2024 14:45 )             29.4     06-24    132<L>  |  95<L>  |  52<H>  ----------------------------<  177<H>  3.9   |  28  |  6.23<H>    Ca    8.5      24 Jun 2024 14:45  Phos  4.6     06-24        Urinalysis Basic - ( 24 Jun 2024 14:45 )    Color: x / Appearance: x / SG: x / pH: x  Gluc: 177 mg/dL / Ketone: x  / Bili: x / Urobili: x   Blood: x / Protein: x / Nitrite: x   Leuk Esterase: x / RBC: x / WBC x   Sq Epi: x / Non Sq Epi: x / Bacteria: x      CAPILLARY BLOOD GLUCOSE      POCT Blood Glucose.: 208 mg/dL (26 Jun 2024 12:06)  POCT Blood Glucose.: 172 mg/dL (26 Jun 2024 07:54)  POCT Blood Glucose.: 124 mg/dL (25 Jun 2024 17:09)                RECENT CULTURES:          RADIOLOGY & ADDITIONAL TESTS:        Care Discussed with Consultants/Other Providers: Yes

## 2024-06-26 NOTE — PROGRESS NOTE ADULT - SUBJECTIVE AND OBJECTIVE BOX
73-year-old male w/ past medical history hypertension, ESRD (HD MWF)  insulin-dependent DM presented 4/29/24 to [facility] with hiccups, chest pain of several days duration, admitted for concern demand ischemia. Early on, he was given baclofen for his hiccups, but soon developed AMS with 2 RRTs by 5/2/24, prompting intubation, upgrade to MICU. There was concern that baclofen toxicity was the culprit of his then-encephalopathic state, as patient had also missed a HD session due to a clotted fistula.     In coming days, he was treated empirically with 5 days of zosyn, had a negative CTH and LP, and had an EEG that did not capture a seizure but showed diffuse non-specific cerebral dysfunction - c/w toxic-metabolic encephalopathies. On 5/6, an MRI showed that the patient had sustained R pontine and cerebellar stroke. He was extubated on 5/9, but owing to a challenging extubation became septic w/ B Cereus, treated with Vanc. He had trach placed 5/14, PEG 5/17, then downgraded.     On the floors, he was followed by several services, including Neurology, who mention the strokes were embolic appearing. Subsequent hospital course notable for nonocclusive R common iliac DVT, for which he was started on a heparin drip which was stopped when patient developed an upper GI Bleed, though GI was consulted and felt he did not have an overt bleed. His dialysis fistula site was stenotic, s/p fistulogram, ultimately changed to a RUE AVF 6/5. He was ultimately started on Eliquis 6/7 for the DVT. Medically stable, he is admitted to [facility] on 6/14/24.     ROS/subjective:  patient seen and evaluated bedside  OOB to Chair tolerating PO  refused meds last night, therapy yesterday  + visual hallucinations- sees bed bugs all over the place- washed by nurses last night with hot water that removed them  seen by neuropsych- psych consult ordered as well  provigil DCd  trach capped- ?pulmonary to DC  Knee pain better with arden sharma  Voiding, moving Bowels- last BM still reported on 6/23  no dizziness, no headaches, no nausea, no vomiting, no SOB, no chest pain  renal notified- for dialysis today?    MEDICATIONS  (STANDING):  albuterol    0.083% 2.5 milliGRAM(s) Nebulizer every 6 hours  amLODIPine   Tablet 10 milliGRAM(s) Oral daily  apixaban 5 milliGRAM(s) Enteral Tube two times a day  aspirin  chewable 81 milliGRAM(s) Oral daily  atorvastatin 20 milliGRAM(s) Oral at bedtime  carvedilol 25 milliGRAM(s) Oral every 12 hours  chlorhexidine 2% Cloths 1 Application(s) Topical <User Schedule>  dextrose 10% Bolus 125 milliLiter(s) IV Bolus once  dextrose 5%. 1000 milliLiter(s) (100 mL/Hr) IV Continuous <Continuous>  dextrose 5%. 1000 milliLiter(s) (50 mL/Hr) IV Continuous <Continuous>  dextrose 50% Injectable 25 Gram(s) IV Push once  dextrose 50% Injectable 12.5 Gram(s) IV Push once  epoetin reilly-epbx (RETACRIT) Injectable 81822 Unit(s) IV Push <User Schedule>  ferrous    sulfate Liquid 300 milliGRAM(s) Enteral Tube daily  glucagon  Injectable 1 milliGRAM(s) IntraMuscular once  hydrALAZINE 100 milliGRAM(s) Oral three times a day  insulin lispro (ADMELOG) corrective regimen sliding scale   SubCutaneous three times a day before meals  lidocaine   4% Patch 1 Patch Transdermal every 24 hours  lidocaine   4% Patch 1 Patch Transdermal every 24 hours  methyl salicylate 15%/menthol 10% Topical Cream 1 Application(s) Topical <User Schedule>  Nephro-noam 1 Tablet(s) Oral daily  pantoprazole   Suspension 40 milliGRAM(s) Enteral Tube two times a day  polyethylene glycol 3350 17 Gram(s) Oral daily  senna 2 Tablet(s) Oral at bedtime    MEDICATIONS  (PRN):  acetaminophen   Oral Liquid .. 650 milliGRAM(s) Oral every 6 hours PRN Moderate Pain (4 - 6)  bisacodyl Suppository 10 milliGRAM(s) Rectal daily PRN Constipation  dextrose Oral Gel 15 Gram(s) Oral once PRN Blood Glucose LESS THAN 70 milliGRAM(s)/deciliter  melatonin 3 milliGRAM(s) Oral at bedtime PRN Insomnia      CAPILLARY BLOOD GLUCOSE      POCT Blood Glucose.: 208 mg/dL (26 Jun 2024 12:06)  POCT Blood Glucose.: 172 mg/dL (26 Jun 2024 07:54)  POCT Blood Glucose.: 124 mg/dL (25 Jun 2024 17:09)      Vital Signs Last 24 Hrs  T(C): 36.3 (26 Jun 2024 08:44), Max: 36.4 (25 Jun 2024 20:00)  T(F): 97.4 (26 Jun 2024 08:44), Max: 97.6 (25 Jun 2024 20:00)  HR: 53 (26 Jun 2024 08:44) (53 - 61)  BP: 107/57 (26 Jun 2024 08:44) (107/57 - 154/71)  BP(mean): --  RR: 17 (26 Jun 2024 08:44) (16 - 18)  SpO2: 97% (26 Jun 2024 08:44) (96% - 98%)    Parameters below as of 26 Jun 2024 16:06  Patient On (Oxygen Delivery Method): Patient on the PMV    Constitutional - NAD, Comfortable with PMV/ trach collar  HEENT - NCAT,   Neck - Supple, No limited ROM  Chest - good chest expansion, good respiratory effort  Cardio - warm and well perfused,   Abdomen -  Soft, NTND. + peg.   Extremities - No peripheral edema, No calf tenderness. RUE fistula.   Neurologic Exam:                    Cognitive -       Verbal Oriented X3 3/3 recall     Speech - Fluent, Comprehensible, No dysarthria, No aphasia      Cranial Nerves - No facial asymmetry, Tongue midline, Shoulder shrug intact no facial droop     Motor -                      LEFT    UE - ShAB 4/5, EF 4/5, EE 3/5, WE 4/5,  WNL                    RIGHT UE - ShAB 4/5, EF 4/5, EE 3/5, WE 4/5,  WNL                    LEFT    LE - HF 3/5, KE 4/5, DF 4/5, PF 4/5 Flexes BLES today- no pain                    RIGHT LE - HF 3/5, KE 4/5, DF 4/5, PF 4/5        Sensory - Intact to LT bilateral in face, arm and legs.      Reflexes - DTR 1 / 4 biceps symmetric. neg Latham's b/l, No clonus.  Psychiatric - Mood stable, Affect WNL  Skin on admission: no sacral pressure injuries.      IDT in [facility] 6/26  Tentative DC 7/9 to home- ? KIMI

## 2024-06-26 NOTE — BH CONSULTATION LIAISON ASSESSMENT NOTE - DIFFERENTIAL
- Delusional disorder  - Brief psychotic episode  - Psychotic disorder with hallucinations/delusions due to known physiological condition

## 2024-06-26 NOTE — CONSULT NOTE ADULT - ASSESSMENT
Patient is seated in wheelchair at bedside. Family/visitors are not present. Patient is alert and oriented to person and loosely oriented to date and place. Speech is fluent and comprehensible. Trach is capped.     Emotional functioning and behavioral history: Mood anxious and affect congruent. Patient repeatedly reports seeing bugs crawling all over his bed and body last night. He reports multiple insect bites, including in his genital areas. He reports "smashing" insects on the floor and there was "blood everywhere from the bugs." He indicates being "washed off with hot water" and the room was evacuated by staff. He indicates police and hazmat are presently on the way. No insects or other debris is seen in room or on his bed, which is reviewed with patient. Patient indicates, "they are there."     He denies feelings of depression, anxiety. He indicates being frightened by his experience last night, and feeling tired today from poor sleep. He denies ideation, plan, or intent to harm self.     Denies prior psychiatric history. Per records, denies use of alcohol or substances.     Psychosocial history: He was born and raised in [place]. 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 Patient indicates his sons and daughter live nearby. He reports having a [employer] orientation.     Impression: Patient with adjustment difficulty due to change of functioning. He presents with new report of formed perceptual disturbance (visual hallucinations). Etiology is unclear (medical, neuropsychiatric), given history of encephalopathy. He denies depression or anxiety. Situational awareness regarding recent history is limited.     Support and encouragement are provided. He expresses appreciation for session.     Plan: Follow-up, supportive therapy to address adjustment. Discussed observations with Dr. Sams and ALMA Martinez.  Neuropsychology remains available through discharge.    Patient is seated in wheelchair at bedside. Family/visitors are not present. Patient is alert and oriented to person and loosely oriented to date and place. Speech is fluent and comprehensible. Trach is capped.     Emotional functioning and behavioral history: Mood anxious and affect congruent. Patient repeatedly reports seeing bugs crawling all over his bed and body last night. He reports multiple insect bites, including in his genital areas. He reports "smashing" insects on the floor and there was "blood everywhere from the bugs." He indicates being "washed off with hot water" and the room was evacuated by staff. He indicates police and hazmat are presently on the way. No insects or other debris is seen in room or on his bed, which is reviewed with patient. Patient indicates, "they are there."     He denies feelings of depression, anxiety. He indicates being frightened by his experience last night, and feeling tired today from poor sleep. He denies ideation, plan, or intent to harm self.     Denies prior psychiatric history. Per records, denies use of alcohol or substances.     Psychosocial history: He was born and raised in [place]. 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 Patient has five children, indicates his sons and daughter live nearby. He reports having a [employer]ost[employer] Rastafarian orientation.     Impression: Patient presents with new report of formed perceptual and thought disturbance. Etiology is unclear (medical, neuropsychiatric), given history of encephalopathy. He denies depression or anxiety. Situational awareness regarding recent history is limited. He exhibits trouble with reasoning, remaining fixed on thoughts that bugs are present despite reassurance from this writer and staff that bed and room has been thoroughly checked.     Support and encouragement are provided. He expresses appreciation for session.     Plan: Follow-up, supportive therapy to address adjustment. Discussed observations with Dr. Sams and ALMA Martinez. Neuropsychology remains available through discharge.

## 2024-06-26 NOTE — BH CONSULTATION LIAISON ASSESSMENT NOTE - NSBHCHARTREVIEWINVESTIGATE_PSY_A_CORE FT
< from: 12 Lead ECG (04.29.24 @ 15:57) >      Ventricular Rate 51 BPM    Atrial Rate 51 BPM    P-R Interval 172 ms    QRS Duration 94 ms    Q-T Interval 452 ms    QTC Calculation(Bazett) 416 ms    P Axis 59 degrees    R Axis 77 degrees    T Axis 87 degrees    Diagnosis Line Sinus bradycardia  Minimal voltage criteria for LVH, may be normal variant ( Sokolow-Ray )  Borderline ECG    < end of copied text >    Basic Metabolic Panel (06.24.24 @ 14:45)   Sodium: 132 mmol/L  Potassium: 3.9 mmol/L  Chloride: 95 mmol/L  Carbon Dioxide: 28 mmol/L  Anion Gap: 9 mmol/L  Blood Urea Nitrogen: 52 mg/dL  Creatinine: 6.23 mg/dL  Glucose: 177 mg/dL  Calcium: 8.5 mg/dL  eGFR: 9:

## 2024-06-26 NOTE — BH CONSULTATION LIAISON ASSESSMENT NOTE - NSICDXBHPRIMARYDX_PSY_ALL_CORE
Situational psychosis, brief   F23   Secondary psychotic disorder with hallucinations and delusions   F06.0

## 2024-06-26 NOTE — PROGRESS NOTE ADULT - ASSESSMENT
73M HTN, ESRD (HD MWF via AVG) DM who was admitted for unstable angina further c/b altered mental status requiring intubation with CRRT requriment, acute CVA vs baclofen toxicity, DVT and GIB s/p PEG/trach. Notable course for AV Fistula dysfunction requiring balloon angioplasty. Now in [place].     Ischemic CVA vs toxic encephalopathy  - continue holding baclofen  - ASA/Statin  - po tray plus Tube Feeds as below    DM with hypoglycemia 6/23,improved  - A1c 6.9  - lantus was discontinued on 6/24  - continue ISS    Acute Hypoxemic, Hypercapnic Respiratory Failure, s/p ETT intubation  #increased secretions, sialorrhea  - scopolamine patch ordered 6/23, monitor secretions.  - s/p tracheostomy   - Albuterol neb q6h   - Pulm reccs appreciated    ESRD on HD MWF via RT AFF  - stable, continue HD    HTN  - Coreg 25 q12h  - Hydralazine 100 mg TID  - Norvasc 10 mg/D    R Common Iliac DVT  - CT AP (5/12) with nonocclusive RIGHT common iliac vein deep venous thrombosis  - Continue Eliquis 5 mg BID     #Oropharyngeal Dysphagia  - s/p PEG (5/17)  - Continue TF    DVT PPX: Eliquis

## 2024-06-26 NOTE — BH CONSULTATION LIAISON ASSESSMENT NOTE - SUMMARY
73M HTN, ESRD (HD MWF via AVG) DM who was admitted for unstable angina further c/b altered mental status requiring intubation with CRRT requriment, acute CVA vs baclofen toxicity, DVT and GIB s/p PEG/trach. Notable course for AV Fistula dysfunction requiring balloon angioplasty. Now in AR. Psychiatry consulted for hallucinations.     Patient observed interacting with rehab therapist, expressing fatigue from sleepless night due to perception of bedbugs. Reports visual and tactile hallucinations of bedbugs. Continues to ruminate and be preoccupied with the belief that bed bugs are in the corners of his bed, despite reassurance from staff that no evidence of bed bugs was found.

## 2024-06-26 NOTE — BH CONSULTATION LIAISON ASSESSMENT NOTE - NSBHCONSULTRECOMMENDOTHER_PSY_A_CORE FT
- Discussed with patient that bed will be checked again and sheets will be changed prior to sleeping. Visual and tactile hallucinations may be a form of sundowning and patient is open to a trial of Seroquel 12.5mg to help with sleeping.  - Discussed with patient that bed will be checked again and sheets will be changed prior to sleeping. Visual and tactile hallucinations may be a form of sundowning and patient is open to a trial of Seroquel 12.5mg to help with sleep.  - Discussed with patient and he may find comfort in having his bed checked again and having sheets changed prior to sleeping.  - Patient noted to be resistant to some medications and may be resistant to other neuroleptic medications. Patient with ongoing visual/tactile hallucinations that happen at night. Would initially trial Seroquel 12.5mg at bedtime for sleep. If the patient tolerates this dose well, we can gradually increase it to achieve effective management of psychosis symptoms.  - Patient's tactile hallucinations and physical sensations of scratchiness and itching might be related to skin issues associated with his underlying renal disease, which are common in this population. Recommend exploring the use of creams that can be applied to the affected areas, as this might provide relief from these sensations.

## 2024-06-26 NOTE — BH CONSULTATION LIAISON ASSESSMENT NOTE - ATTENDING COMMENTS
Pt may have psychotic or delusional reaction to dermatological problems associated with renal disease, probability of developing delusional parasitosis rare given his medical co morbidities, pt may need soothing skin regimen, will monitor for response to Quetiapine.

## 2024-06-26 NOTE — PROGRESS NOTE ADULT - SUBJECTIVE AND OBJECTIVE BOX
[facility] NEPHROLOGY SERVICES, [facility]  NEPHROLOGY AND HYPERTENSION  [address]  [address]  [place] [address]  [phone]    MD RONNA CHANG MD YELENA ROSENBERG, MD BINNY KOSHY, MD CHRISTOPHER CAPUTO, MD EDWARD BOVER, MD          Patient events noted  No distress  Reported periods of confusion     MEDICATIONS  (STANDING):  albuterol    0.083% 2.5 milliGRAM(s) Nebulizer every 6 hours  amLODIPine   Tablet 10 milliGRAM(s) Oral daily  apixaban 5 milliGRAM(s) Enteral Tube two times a day  aspirin  chewable 81 milliGRAM(s) Oral daily  atorvastatin 20 milliGRAM(s) Oral at bedtime  carvedilol 25 milliGRAM(s) Oral every 12 hours  chlorhexidine 2% Cloths 1 Application(s) Topical <User Schedule>  dextrose 10% Bolus 125 milliLiter(s) IV Bolus once  dextrose 5%. 1000 milliLiter(s) (50 mL/Hr) IV Continuous <Continuous>  dextrose 5%. 1000 milliLiter(s) (100 mL/Hr) IV Continuous <Continuous>  dextrose 50% Injectable 12.5 Gram(s) IV Push once  dextrose 50% Injectable 25 Gram(s) IV Push once  epoetin reilly-epbx (RETACRIT) Injectable 86316 Unit(s) IV Push <User Schedule>  ferrous    sulfate Liquid 300 milliGRAM(s) Enteral Tube daily  glucagon  Injectable 1 milliGRAM(s) IntraMuscular once  hydrALAZINE 100 milliGRAM(s) Oral three times a day  insulin lispro (ADMELOG) corrective regimen sliding scale   SubCutaneous three times a day before meals  lidocaine   4% Patch 1 Patch Transdermal every 24 hours  lidocaine   4% Patch 1 Patch Transdermal every 24 hours  methyl salicylate 15%/menthol 10% Topical Cream 1 Application(s) Topical <User Schedule>  Nephro-noam 1 Tablet(s) Oral daily  pantoprazole   Suspension 40 milliGRAM(s) Enteral Tube two times a day  polyethylene glycol 3350 17 Gram(s) Oral daily  QUEtiapine 12.5 milliGRAM(s) Oral at bedtime  senna 2 Tablet(s) Oral at bedtime    MEDICATIONS  (PRN):  acetaminophen   Oral Liquid .. 650 milliGRAM(s) Oral every 6 hours PRN Moderate Pain (4 - 6)  bisacodyl Suppository 10 milliGRAM(s) Rectal daily PRN Constipation  dextrose Oral Gel 15 Gram(s) Oral once PRN Blood Glucose LESS THAN 70 milliGRAM(s)/deciliter  melatonin 3 milliGRAM(s) Oral at bedtime PRN Insomnia      PHYSICAL EXAM:      T(C): 36.8 (06-26-24 @ 18:15), Max: 36.8 (06-26-24 @ 15:05)  HR: 62 (06-26-24 @ 19:13) (53 - 62)  BP: 127/69 (06-26-24 @ 19:13) (107/57 - 128/64)  RR: 18 (06-26-24 @ 18:15) (17 - 18)  SpO2: 98% (06-26-24 @ 18:15) (97% - 100%)  Wt(kg): --  Lungs clear  Heart S1S2  Abd soft NT ND  Extremities:   tr edema                                    8.9    7.88  )-----------( 286      ( 26 Jun 2024 18:30 )             27.7               Creatinine Trend: 6.23<--, 4.90<--, 5.36<--, 5.97<--, 3.88<--, 3.51<--    A/P:    ESRD on HD MWF  S/p complicated hospital course as per HPI  S/p CVA, trach, PEG  Complete HD as ordered UF as tolerated   Epoetin w/HD 3 x week  Rehab  Will follow mental status         Delonte Moya MD

## 2024-06-26 NOTE — BH CONSULTATION LIAISON ASSESSMENT NOTE - CURRENT MEDICATION
MEDICATIONS  (STANDING):  albuterol    0.083% 2.5 milliGRAM(s) Nebulizer every 6 hours  amLODIPine   Tablet 10 milliGRAM(s) Oral daily  apixaban 5 milliGRAM(s) Enteral Tube two times a day  aspirin  chewable 81 milliGRAM(s) Oral daily  atorvastatin 20 milliGRAM(s) Oral at bedtime  carvedilol 25 milliGRAM(s) Oral every 12 hours  chlorhexidine 2% Cloths 1 Application(s) Topical <User Schedule>  dextrose 10% Bolus 125 milliLiter(s) IV Bolus once  dextrose 5%. 1000 milliLiter(s) (100 mL/Hr) IV Continuous <Continuous>  dextrose 5%. 1000 milliLiter(s) (50 mL/Hr) IV Continuous <Continuous>  dextrose 50% Injectable 25 Gram(s) IV Push once  dextrose 50% Injectable 12.5 Gram(s) IV Push once  epoetin reilly-epbx (RETACRIT) Injectable 15827 Unit(s) IV Push <User Schedule>  ferrous    sulfate Liquid 300 milliGRAM(s) Enteral Tube daily  glucagon  Injectable 1 milliGRAM(s) IntraMuscular once  hydrALAZINE 100 milliGRAM(s) Oral three times a day  insulin lispro (ADMELOG) corrective regimen sliding scale   SubCutaneous three times a day before meals  lidocaine   4% Patch 1 Patch Transdermal every 24 hours  lidocaine   4% Patch 1 Patch Transdermal every 24 hours  methyl salicylate 15%/menthol 10% Topical Cream 1 Application(s) Topical <User Schedule>  Nephro-noam 1 Tablet(s) Oral daily  pantoprazole   Suspension 40 milliGRAM(s) Enteral Tube two times a day  polyethylene glycol 3350 17 Gram(s) Oral daily  senna 2 Tablet(s) Oral at bedtime    MEDICATIONS  (PRN):  acetaminophen   Oral Liquid .. 650 milliGRAM(s) Oral every 6 hours PRN Moderate Pain (4 - 6)  bisacodyl Suppository 10 milliGRAM(s) Rectal daily PRN Constipation  dextrose Oral Gel 15 Gram(s) Oral once PRN Blood Glucose LESS THAN 70 milliGRAM(s)/deciliter  melatonin 3 milliGRAM(s) Oral at bedtime PRN Insomnia

## 2024-06-26 NOTE — BH CONSULTATION LIAISON ASSESSMENT NOTE - DESCRIPTION
Patient from [place] and reports coming to [place] in 1987. Has been  for 48 years and currently has 5 children. 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

## 2024-06-26 NOTE — BH CONSULTATION LIAISON ASSESSMENT NOTE - RISK ASSESSMENT
Risk factors include irritability and new onset of psychosis.    Protective factors include no prior psychiatric history, no history of suicidality, no history of combativeness, strong support system.

## 2024-06-27 LAB
GLUCOSE BLDC GLUCOMTR-MCNC: 116 MG/DL — HIGH (ref 70–99)
GLUCOSE BLDC GLUCOMTR-MCNC: 160 MG/DL — HIGH (ref 70–99)
GLUCOSE BLDC GLUCOMTR-MCNC: 220 MG/DL — HIGH (ref 70–99)

## 2024-06-27 PROCEDURE — 99232 SBSQ HOSP IP/OBS MODERATE 35: CPT

## 2024-06-27 PROCEDURE — 99232 SBSQ HOSP IP/OBS MODERATE 35: CPT | Mod: GC

## 2024-06-27 RX ADMIN — HYDRALAZINE HYDROCHLORIDE 100 MILLIGRAM(S): 50 TABLET ORAL at 22:02

## 2024-06-27 RX ADMIN — MENTHOL, METHYL SALICYLATE 1 APPLICATION(S): 63; 210 PATCH PERCUTANEOUS; TOPICAL; TRANSDERMAL at 12:55

## 2024-06-27 RX ADMIN — APIXABAN 5 MILLIGRAM(S): 5 TABLET, FILM COATED ORAL at 19:50

## 2024-06-27 RX ADMIN — INSULIN LISPRO 1: 100 INJECTION, SOLUTION SUBCUTANEOUS at 08:36

## 2024-06-27 RX ADMIN — Medication 1 APPLICATION(S): at 05:27

## 2024-06-27 RX ADMIN — Medication 2.5 MILLIGRAM(S): at 04:32

## 2024-06-27 RX ADMIN — INSULIN LISPRO 2: 100 INJECTION, SOLUTION SUBCUTANEOUS at 12:03

## 2024-06-27 RX ADMIN — Medication 650 MILLIGRAM(S): at 23:00

## 2024-06-27 RX ADMIN — ATORVASTATIN CALCIUM 20 MILLIGRAM(S): 20 TABLET, FILM COATED ORAL at 22:02

## 2024-06-27 RX ADMIN — APIXABAN 5 MILLIGRAM(S): 5 TABLET, FILM COATED ORAL at 05:20

## 2024-06-27 RX ADMIN — Medication 650 MILLIGRAM(S): at 22:02

## 2024-06-27 RX ADMIN — CARVEDILOL PHOSPHATE 25 MILLIGRAM(S): 80 CAPSULE, EXTENDED RELEASE ORAL at 05:21

## 2024-06-27 RX ADMIN — PANTOPRAZOLE SODIUM 40 MILLIGRAM(S): 40 INJECTION, POWDER, FOR SOLUTION INTRAVENOUS at 05:20

## 2024-06-27 RX ADMIN — PANTOPRAZOLE SODIUM 40 MILLIGRAM(S): 40 INJECTION, POWDER, FOR SOLUTION INTRAVENOUS at 19:51

## 2024-06-27 RX ADMIN — Medication 1 TABLET(S): at 12:39

## 2024-06-27 RX ADMIN — Medication 2.5 MILLIGRAM(S): at 08:52

## 2024-06-27 RX ADMIN — ASPIRIN 81 MILLIGRAM(S): 325 TABLET, FILM COATED ORAL at 12:39

## 2024-06-27 RX ADMIN — CARVEDILOL PHOSPHATE 25 MILLIGRAM(S): 80 CAPSULE, EXTENDED RELEASE ORAL at 19:50

## 2024-06-27 RX ADMIN — Medication 12.5 MILLIGRAM(S): at 22:03

## 2024-06-27 RX ADMIN — Medication 2.5 MILLIGRAM(S): at 21:26

## 2024-06-27 RX ADMIN — AMLODIPINE BESYLATE 10 MILLIGRAM(S): 2.5 TABLET ORAL at 05:21

## 2024-06-27 RX ADMIN — Medication 300 MILLIGRAM(S): at 12:39

## 2024-06-27 RX ADMIN — HYDRALAZINE HYDROCHLORIDE 100 MILLIGRAM(S): 50 TABLET ORAL at 05:20

## 2024-06-27 NOTE — PROGRESS NOTE ADULT - ASSESSMENT
73M HTN, ESRD (HD MWF via AVG) DM who was admitted for unstable angina further c/b altered mental status requiring intubation with CRRT requriment, acute CVA vs baclofen toxicity, DVT and GIB s/p PEG/trach. Notable course for AV Fistula dysfunction requiring balloon angioplasty. Now in AR.     Ischemic CVA vs toxic encephalopathy  - continue holding baclofen  - ASA/Statin      DM with hypoglycemia 6/23,improved  - A1c 6.9  - lantus was discontinued on 6/24  - continue ISS    Acute Hypoxemic, Hypercapnic Respiratory Failure, s/p ETT intubation  #increased secretions, sialorrhea  - scopolamine patch ordered 6/23, monitor secretions.  - s/p tracheostomy   - Albuterol neb q6h   - Pulm reccs appreciated    ESRD on HD MWF via RT AFF  - stable, continue HD    Hallucination  - ESRD on HD, recently treated for encephalopathy  - Seroquel 12.5mg HS  - Psych following    HTN  - Coreg 25 q12h  - Hydralazine 100 mg TID  - Norvasc 10 mg/D    R Common Iliac DVT  - CT AP (5/12) with nonocclusive RIGHT common iliac vein deep venous thrombosis  - Continue Eliquis 5 mg BID     #Oropharyngeal Dysphagia  - s/p PEG (5/17)  - Continue TF    DVT PPX: Eliquis

## 2024-06-27 NOTE — BH CONSULTATION LIAISON PROGRESS NOTE - ATTENDING COMMENTS
Agree with plan of care, monitor response to medication, administer skin cream for dermatitis associated with renal disease.

## 2024-06-27 NOTE — BH CONSULTATION LIAISON PROGRESS NOTE - NSBHCHARTREVIEWVS_PSY_A_CORE FT
Vital Signs Last 24 Hrs  T(C): 36.8 (27 Jun 2024 08:09), Max: 37.3 (26 Jun 2024 22:11)  T(F): 98.3 (27 Jun 2024 08:09), Max: 99.2 (26 Jun 2024 22:11)  HR: 60 (27 Jun 2024 13:35) (54 - 68)  BP: 93/44 (27 Jun 2024 13:35) (93/44 - 154/62)  BP(mean): --  RR: 16 (27 Jun 2024 08:09) (16 - 18)  SpO2: 98% (27 Jun 2024 13:35) (91% - 100%)    Parameters below as of 27 Jun 2024 13:35  Patient On (Oxygen Delivery Method): trach capped

## 2024-06-27 NOTE — PROGRESS NOTE ADULT - SUBJECTIVE AND OBJECTIVE BOX
PROGRESS NOTE:     Patient is a 73y old  Male who presents with a chief complaint of CVA, Encephalopathy (24 Jun 2024 17:07)          SUBJECTIVE & OBJECTIVE:   Pt seen and examined at bedside in AM.         REVIEW OF SYSTEMS: limited    PHYSICAL EXAM:  VITALS:  Vital Signs Last 24 Hrs  T(C): 36.8 (27 Jun 2024 08:09), Max: 37.3 (26 Jun 2024 22:11)  T(F): 98.3 (27 Jun 2024 08:09), Max: 99.2 (26 Jun 2024 22:11)  HR: 63 (27 Jun 2024 08:09) (54 - 68)  BP: 154/62 (27 Jun 2024 08:09) (124/62 - 154/62)  BP(mean): --  RR: 16 (27 Jun 2024 08:09) (16 - 18)  SpO2: 93% (27 Jun 2024 08:50) (91% - 100%)    Parameters below as of 27 Jun 2024 08:50  Patient On (Oxygen Delivery Method): trach capped, room air          GENERAL: NAD,  no increased WOB  HEAD:  Atraumatic, Normocephalic  EYES: EOMI,  conjunctiva and sclera clear  ENMT: Moist mucous membranes  NECK: Supple, capped trach  NERVOUS SYSTEM:  Alert  CHEST/LUNG: Clear to auscultation bilaterally; No rales, rhonchi, wheezing  HEART: Regular rate and rhythm  ABDOMEN: Soft, Nontender, Nondistended; Bowel sounds present  EXTREMITIES: No clubbing, cyanosis, calf tenderness or edema b/l    MEDICATIONS  (STANDING):  albuterol    0.083% 2.5 milliGRAM(s) Nebulizer every 6 hours  amLODIPine   Tablet 10 milliGRAM(s) Oral daily  apixaban 5 milliGRAM(s) Enteral Tube two times a day  aspirin  chewable 81 milliGRAM(s) Oral daily  atorvastatin 20 milliGRAM(s) Oral at bedtime  carvedilol 25 milliGRAM(s) Oral every 12 hours  chlorhexidine 2% Cloths 1 Application(s) Topical <User Schedule>  dextrose 10% Bolus 125 milliLiter(s) IV Bolus once  dextrose 5%. 1000 milliLiter(s) (50 mL/Hr) IV Continuous <Continuous>  dextrose 5%. 1000 milliLiter(s) (100 mL/Hr) IV Continuous <Continuous>  dextrose 50% Injectable 12.5 Gram(s) IV Push once  dextrose 50% Injectable 25 Gram(s) IV Push once  epoetin reilly-epbx (RETACRIT) Injectable 39953 Unit(s) IV Push <User Schedule>  ferrous    sulfate Liquid 300 milliGRAM(s) Enteral Tube daily  glucagon  Injectable 1 milliGRAM(s) IntraMuscular once  hydrALAZINE 100 milliGRAM(s) Oral three times a day  insulin lispro (ADMELOG) corrective regimen sliding scale   SubCutaneous three times a day before meals  lidocaine   4% Patch 1 Patch Transdermal every 24 hours  lidocaine   4% Patch 1 Patch Transdermal every 24 hours  methyl salicylate 15%/menthol 10% Topical Cream 1 Application(s) Topical <User Schedule>  Nephro-noam 1 Tablet(s) Oral daily  pantoprazole   Suspension 40 milliGRAM(s) Enteral Tube two times a day  polyethylene glycol 3350 17 Gram(s) Oral daily  QUEtiapine 12.5 milliGRAM(s) Oral at bedtime  senna 2 Tablet(s) Oral at bedtime    MEDICATIONS  (PRN):  acetaminophen   Oral Liquid .. 650 milliGRAM(s) Oral every 6 hours PRN Moderate Pain (4 - 6)  bisacodyl Suppository 10 milliGRAM(s) Rectal daily PRN Constipation  dextrose Oral Gel 15 Gram(s) Oral once PRN Blood Glucose LESS THAN 70 milliGRAM(s)/deciliter  melatonin 3 milliGRAM(s) Oral at bedtime PRN Insomnia      Allergies    No Known Allergies    Intolerances              LABS:                                      8.9    7.88  )-----------( 286      ( 26 Jun 2024 18:30 )             27.7                     CAPILLARY BLOOD GLUCOSE      POCT Blood Glucose.: 220 mg/dL (27 Jun 2024 11:55)  POCT Blood Glucose.: 160 mg/dL (27 Jun 2024 07:49)  POCT Blood Glucose.: 205 mg/dL (26 Jun 2024 21:40)  POCT Blood Glucose.: 102 mg/dL (26 Jun 2024 18:44)                  RECENT CULTURES:          RADIOLOGY & ADDITIONAL TESTS:        Care Discussed with Consultants/Other Providers: Yes   Statement Selected

## 2024-06-27 NOTE — BH CONSULTATION LIAISON PROGRESS NOTE - NSBHCONSULTRECOMMENDOTHER_PSY_A_CORE FT
- Continue with Seroquel 12.5mg qhs.   - Patient's spouse is open to antidepressant therapy but would like to continue observing patient and defers treatment at this time. Is willing to revisit conversation next week. In the future, can consider a trial of Lexapro.

## 2024-06-27 NOTE — PROGRESS NOTE ADULT - SUBJECTIVE AND OBJECTIVE BOX
73-year-old male w/ past medical history hypertension, ESRD (HD MWF)  insulin-dependent DM presented 4/29/24 to [facility] with hiccups, chest pain of several days duration, admitted for concern demand ischemia. Early on, he was given baclofen for his hiccups, but soon developed AMS with 2 RRTs by 5/2/24, prompting intubation, upgrade to MICU. There was concern that baclofen toxicity was the culprit of his then-encephalopathic state, as patient had also missed a HD session due to a clotted fistula.     In coming days, he was treated empirically with 5 days of zosyn, had a negative CTH and LP, and had an EEG that did not capture a seizure but showed diffuse non-specific cerebral dysfunction - c/w toxic-metabolic encephalopathies. On 5/6, an MRI showed that the patient had sustained R pontine and cerebellar stroke. He was extubated on 5/9, but owing to a challenging extubation became septic w/ B Cereus, treated with Vanc. He had trach placed 5/14, PEG 5/17, then downgraded.     On the floors, he was followed by several services, including Neurology, who mention the strokes were embolic appearing. Subsequent hospital course notable for nonocclusive R common iliac DVT, for which he was started on a heparin drip which was stopped when patient developed an upper GI Bleed, though GI was consulted and felt he did not have an overt bleed. His dialysis fistula site was stenotic, s/p fistulogram, ultimately changed to a RUE AVF 6/5. He was ultimately started on Eliquis 6/7 for the DVT. Medically stable, he is admitted to [facility] on 6/14/24.     ROS/subjective:  patient seen and evaluated bedside  OOB to Chair tolerating PO  slept well on seroquel- no hallucinations overnight  Appreciate psych   provigil DCd  trach capped- sats 97%  Knee pain better with arden sharma  Voiding, moving Bowels- last BM still reported on 6/25  no dizziness, no headaches, no nausea, no vomiting, no SOB, no chest pain      MEDICATIONS  (STANDING):  albuterol    0.083% 2.5 milliGRAM(s) Nebulizer every 6 hours  amLODIPine   Tablet 10 milliGRAM(s) Oral daily  apixaban 5 milliGRAM(s) Enteral Tube two times a day  aspirin  chewable 81 milliGRAM(s) Oral daily  atorvastatin 20 milliGRAM(s) Oral at bedtime  carvedilol 25 milliGRAM(s) Oral every 12 hours  chlorhexidine 2% Cloths 1 Application(s) Topical <User Schedule>  dextrose 10% Bolus 125 milliLiter(s) IV Bolus once  dextrose 5%. 1000 milliLiter(s) (100 mL/Hr) IV Continuous <Continuous>  dextrose 5%. 1000 milliLiter(s) (50 mL/Hr) IV Continuous <Continuous>  dextrose 50% Injectable 25 Gram(s) IV Push once  dextrose 50% Injectable 12.5 Gram(s) IV Push once  epoetin reilly-epbx (RETACRIT) Injectable 44145 Unit(s) IV Push <User Schedule>  ferrous    sulfate Liquid 300 milliGRAM(s) Enteral Tube daily  glucagon  Injectable 1 milliGRAM(s) IntraMuscular once  hydrALAZINE 100 milliGRAM(s) Oral three times a day  insulin lispro (ADMELOG) corrective regimen sliding scale   SubCutaneous three times a day before meals  lidocaine   4% Patch 1 Patch Transdermal every 24 hours  lidocaine   4% Patch 1 Patch Transdermal every 24 hours  methyl salicylate 15%/menthol 10% Topical Cream 1 Application(s) Topical <User Schedule>  Nephro-noam 1 Tablet(s) Oral daily  pantoprazole   Suspension 40 milliGRAM(s) Enteral Tube two times a day  polyethylene glycol 3350 17 Gram(s) Oral daily  QUEtiapine 12.5 milliGRAM(s) Oral at bedtime  senna 2 Tablet(s) Oral at bedtime    MEDICATIONS  (PRN):  acetaminophen   Oral Liquid .. 650 milliGRAM(s) Oral every 6 hours PRN Moderate Pain (4 - 6)  bisacodyl Suppository 10 milliGRAM(s) Rectal daily PRN Constipation  dextrose Oral Gel 15 Gram(s) Oral once PRN Blood Glucose LESS THAN 70 milliGRAM(s)/deciliter  melatonin 3 milliGRAM(s) Oral at bedtime PRN Insomnia                            8.9    7.88  )-----------( 286      ( 26 Jun 2024 18:30 )             27.7               CAPILLARY BLOOD GLUCOSE      POCT Blood Glucose.: 220 mg/dL (27 Jun 2024 11:55)  POCT Blood Glucose.: 160 mg/dL (27 Jun 2024 07:49)  POCT Blood Glucose.: 205 mg/dL (26 Jun 2024 21:40)  POCT Blood Glucose.: 102 mg/dL (26 Jun 2024 18:44)      Vital Signs Last 24 Hrs  T(C): 36.8 (27 Jun 2024 08:09), Max: 37.3 (26 Jun 2024 22:11)  T(F): 98.3 (27 Jun 2024 08:09), Max: 99.2 (26 Jun 2024 22:11)  HR: 63 (27 Jun 2024 08:09) (54 - 68)  BP: 154/62 (27 Jun 2024 08:09) (124/62 - 154/62)  BP(mean): --  RR: 16 (27 Jun 2024 08:09) (16 - 18)  SpO2: 93% (27 Jun 2024 08:50) (91% - 100%)    Parameters below as of 27 Jun 2024 08:50  Patient On (Oxygen Delivery Method): trach capped, room air      Constitutional - NAD, Comfortable with PMV/ trach collar  HEENT - NCAT,   Neck - Supple, No limited ROM  Chest - good chest expansion, good respiratory effort  Cardio - warm and well perfused,   Abdomen -  Soft, NTND. + peg.   Extremities - No peripheral edema, No calf tenderness. RUE fistula.   Neurologic Exam:                    Cognitive -       Verbal Oriented X3 3/3 recall     Speech - Fluent, Comprehensible, No dysarthria, No aphasia      Cranial Nerves - No facial asymmetry, Tongue midline, Shoulder shrug intact no facial droop     Motor -                      LEFT    UE - ShAB 4/5, EF 4/5, EE 3/5, WE 4/5,  WNL                    RIGHT UE - ShAB 4/5, EF 4/5, EE 3/5, WE 4/5,  WNL                    LEFT    LE - HF 3/5, KE 4/5, DF 4/5, PF 4/5 Flexes BLES today- no pain                    RIGHT LE - HF 3/5, KE 4/5, DF 4/5, PF 4/5        Sensory - Intact to LT bilateral in face, arm and legs.      Reflexes - DTR 1 / 4 biceps symmetric. neg Latham's b/l, No clonus.  Psychiatric - Mood stable, Affect WNL  Skin on admission: no sacral pressure injuries.      IDT in [facility] 6/26  Tentative DC 7/9 to home- ? KIMI

## 2024-06-27 NOTE — BH CONSULTATION LIAISON PROGRESS NOTE - CURRENT MEDICATION
MEDICATIONS  (STANDING):  albuterol    0.083% 2.5 milliGRAM(s) Nebulizer every 6 hours  amLODIPine   Tablet 10 milliGRAM(s) Oral daily  apixaban 5 milliGRAM(s) Enteral Tube two times a day  aspirin  chewable 81 milliGRAM(s) Oral daily  atorvastatin 20 milliGRAM(s) Oral at bedtime  carvedilol 25 milliGRAM(s) Oral every 12 hours  chlorhexidine 2% Cloths 1 Application(s) Topical <User Schedule>  dextrose 10% Bolus 125 milliLiter(s) IV Bolus once  dextrose 5%. 1000 milliLiter(s) (50 mL/Hr) IV Continuous <Continuous>  dextrose 5%. 1000 milliLiter(s) (100 mL/Hr) IV Continuous <Continuous>  dextrose 50% Injectable 12.5 Gram(s) IV Push once  dextrose 50% Injectable 25 Gram(s) IV Push once  epoetin reilly-epbx (RETACRIT) Injectable 13905 Unit(s) IV Push <User Schedule>  ferrous    sulfate Liquid 300 milliGRAM(s) Enteral Tube daily  glucagon  Injectable 1 milliGRAM(s) IntraMuscular once  hydrALAZINE 100 milliGRAM(s) Oral three times a day  insulin lispro (ADMELOG) corrective regimen sliding scale   SubCutaneous three times a day before meals  lidocaine   4% Patch 1 Patch Transdermal every 24 hours  lidocaine   4% Patch 1 Patch Transdermal every 24 hours  methyl salicylate 15%/menthol 10% Topical Cream 1 Application(s) Topical <User Schedule>  Nephro-noam 1 Tablet(s) Oral daily  pantoprazole   Suspension 40 milliGRAM(s) Enteral Tube two times a day  polyethylene glycol 3350 17 Gram(s) Oral daily  QUEtiapine 12.5 milliGRAM(s) Oral at bedtime  senna 2 Tablet(s) Oral at bedtime    MEDICATIONS  (PRN):  acetaminophen   Oral Liquid .. 650 milliGRAM(s) Oral every 6 hours PRN Moderate Pain (4 - 6)  bisacodyl Suppository 10 milliGRAM(s) Rectal daily PRN Constipation  dextrose Oral Gel 15 Gram(s) Oral once PRN Blood Glucose LESS THAN 70 milliGRAM(s)/deciliter  melatonin 3 milliGRAM(s) Oral at bedtime PRN Insomnia

## 2024-06-27 NOTE — PROGRESS NOTE ADULT - SUBJECTIVE AND OBJECTIVE BOX
Time of Visit:  Patient seen and examined. seen earlier today with wife at bedside feeding pat     MEDICATIONS  (STANDING):  albuterol    0.083% 2.5 milliGRAM(s) Nebulizer every 6 hours  amLODIPine   Tablet 10 milliGRAM(s) Oral daily  apixaban 5 milliGRAM(s) Enteral Tube two times a day  aspirin  chewable 81 milliGRAM(s) Oral daily  atorvastatin 20 milliGRAM(s) Oral at bedtime  carvedilol 25 milliGRAM(s) Oral every 12 hours  chlorhexidine 2% Cloths 1 Application(s) Topical <User Schedule>  dextrose 10% Bolus 125 milliLiter(s) IV Bolus once  dextrose 5%. 1000 milliLiter(s) (100 mL/Hr) IV Continuous <Continuous>  dextrose 5%. 1000 milliLiter(s) (50 mL/Hr) IV Continuous <Continuous>  dextrose 50% Injectable 25 Gram(s) IV Push once  dextrose 50% Injectable 12.5 Gram(s) IV Push once  epoetin reilly-epbx (RETACRIT) Injectable 74465 Unit(s) IV Push <User Schedule>  ferrous    sulfate Liquid 300 milliGRAM(s) Enteral Tube daily  glucagon  Injectable 1 milliGRAM(s) IntraMuscular once  hydrALAZINE 100 milliGRAM(s) Oral three times a day  insulin lispro (ADMELOG) corrective regimen sliding scale   SubCutaneous three times a day before meals  lidocaine   4% Patch 1 Patch Transdermal every 24 hours  lidocaine   4% Patch 1 Patch Transdermal every 24 hours  methyl salicylate 15%/menthol 10% Topical Cream 1 Application(s) Topical <User Schedule>  Nephro-noam 1 Tablet(s) Oral daily  pantoprazole   Suspension 40 milliGRAM(s) Enteral Tube two times a day  polyethylene glycol 3350 17 Gram(s) Oral daily  QUEtiapine 12.5 milliGRAM(s) Oral at bedtime  senna 2 Tablet(s) Oral at bedtime      MEDICATIONS  (PRN):  acetaminophen   Oral Liquid .. 650 milliGRAM(s) Oral every 6 hours PRN Moderate Pain (4 - 6)  bisacodyl Suppository 10 milliGRAM(s) Rectal daily PRN Constipation  dextrose Oral Gel 15 Gram(s) Oral once PRN Blood Glucose LESS THAN 70 milliGRAM(s)/deciliter  melatonin 3 milliGRAM(s) Oral at bedtime PRN Insomnia       Medications up to date at time of exam.      PHYSICAL EXAMINATION:  Patient has no new complaints.  GENERAL: The patient  in no apparent distress.     Vital Signs Last 24 Hrs  T(C): 36.8 (27 Jun 2024 08:09), Max: 37.3 (26 Jun 2024 22:11)  T(F): 98.3 (27 Jun 2024 08:09), Max: 99.2 (26 Jun 2024 22:11)  HR: 60 (27 Jun 2024 13:35) (56 - 68)  BP: 93/44 (27 Jun 2024 13:35) (93/44 - 154/62)  BP(mean): --  RR: 16 (27 Jun 2024 08:09) (16 - 18)  SpO2: 98% (27 Jun 2024 13:35) (91% - 98%)    Parameters below as of 27 Jun 2024 13:35  Patient On (Oxygen Delivery Method): trach capped       (if applicable)    Chest Tube (if applicable)    HEENT: Head is normocephalic and atraumatic. Extraocular muscles are intact. Mucous membranes are moist.     NECK: Supple, no palpable adenopathy. + trach capped during the day     LUNGS: Fair bilateral air entry   no wheezing, rales, or rhonchi.    HEART: Regular rate and rhythm without murmur.    ABDOMEN: Soft, nontender, and nondistended.  No hepatosplenomegaly is noted.    : No painful voiding, no pelvic pain    EXTREMITIES: Without any cyanosis, clubbing, rash, lesions or edema.    NEUROLOGIC: Awake, alert, oriented, grossly intact    SKIN: Warm, dry, good turgor.      LABS:                        8.9    7.88  )-----------( 286      ( 26 Jun 2024 18:30 )             27.7     MICROBIOLOGY: (if applicable)    RADIOLOGY & ADDITIONAL STUDIES:  EKG:   CXR:  ECHO:    IMPRESSION: 73y Male PAST MEDICAL & SURGICAL HISTORY:  Diabetes      Benign essential HTN      HLD (hyperlipidemia)      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      ESRD on hemodialysis      Arteriovenous fistula       p/w         IMP: This is a 73 yr old man with HTN, ESRD (HD MWF via AVG) DM who was admitted for unstable angina further c/b altered mental status requiring intubation with CRRT requriment, acute CVA vs baclofen toxicity, DVT and GIB s/p PEG/trach. Notable course for AV Fistula dysfunction requiring balloon angioplasty. Pat trach is cap  during the day while awake as per speech path       Assessment  1. Chronic Respiratory failure s/p Trach  2. AMS appears to have improved , ? Baclofen toxicity vs CVA  3. ESRD on HD, DM 2,   4. Right iliac DVT on Eliquis      Plan  - Continue to cap trach during the day while awake  as tolerate with speech path monitoring   - Oral suction , minimize trach suction   - Duoneb q6h   - O2 supp at 28 % TC to maintain sat >90%  - PEG feed   - Neph for HD   - Scopolamine patch     spoke with wife at bedside and speech path at bedside

## 2024-06-27 NOTE — BH CONSULTATION LIAISON PROGRESS NOTE - NSBHCONSULTFOLLOWAFTERCARE_PSY_A_CORE FT
Recommend f/u with [facility] ([phone]) or connecting with psychiatric provider that is in-network with patient's insurance.

## 2024-06-27 NOTE — BH CONSULTATION LIAISON PROGRESS NOTE - NSBHASSESSMENTFT_PSY_ALL_CORE
alone 73M HTN, ESRD (HD MWF via AVG) DM who was admitted for unstable angina further c/b altered mental status requiring intubation with CRRT requriment, acute CVA vs baclofen toxicity, DVT and GIB s/p PEG/trach. Notable course for AV Fistula dysfunction requiring balloon angioplasty. Now in [place]. Psychiatry consulted for hallucinations.    Patient with brighter affect and denies any delusions or hallucinations.   Spouse believes patient has depressed affect when compared to baseline.

## 2024-06-27 NOTE — BH CONSULTATION LIAISON PROGRESS NOTE - NSBHFUPINTERVALHXFT_PSY_A_CORE
73M HTN, ESRD (HD MWF via AVG) DM who was admitted for unstable angina further c/b altered mental status requiring intubation with CRRT requriment, acute CVA vs baclofen toxicity, DVT and GIB s/p PEG/trach. Notable course for AV Fistula dysfunction requiring balloon angioplasty. Now in [place]. Psychiatry consulted for hallucinations.    C/L Psychiatry Note:  Chart reviewed and patient evaluated, accompanied by spouse at bedside. Patient presents AAOx4 and with bright affect. Patient reports his mood is good and he is doing better than yesterday. He endorses sleeping well throughout the night and denies experiencing any tactile/visual hallucinations of bed bugs since yesterday. Patient has been participating in all rehab sessions today. Spouse reports she is aware of events from yesterday. Patient's spouse confirms that he has skin issues related to dialysis and that he has specialized cream at home. She expressed that she would bring the cream to the hospital so it can be administered by the nursing staff, as his skin condition may have contributed to the tactile sensations he experienced yesterday. She also reports that patient has not mentioned any delusions or hallucinations since yesterday. Patient's spouse notes that he has been exhibiting a depressed affect compared to his baseline. She attributes this change to his recent hospitalization, which lasted 8-9 weeks, noting that he had never been hospitalized before this event. Patient's spouse mentioned that she is open to antidepressant therapy but prefers to hold off for now to continue evaluating his affect. She believes that he might not be forthcoming about his actual mood, even though he claims he is fine. Discussed risks versus benefit of Seroquel and agrees to plan of care.

## 2024-06-27 NOTE — PROGRESS NOTE ADULT - ASSESSMENT
73-year-old male w/ past medical history hypertension, ESRD (HD MWF)  insulin-dependent DM presented 4/29/24 to [facility] 4/29 with hiccups and demand ischemia, was treated with baclofen, developed what appears to be toxic encephalopathy. He then sustained a prolonged, complex hospital course over approx 6 weeks, notable for acute respiratory failure, intubation, sepsis, CRRT, DVT, GIB, trach and PEG placement, He is admitted for acute multidisciplinary rehab on 6/14/24 to [facility].     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 Pulmonary to DC  - Respiratory to follow in house    #ESRD on HD  #Fistula stenosis   #Anemia   - Continue HD M/W/F per renal   - Access is RUE AVF  - Regular monitoring of electrolytes  - EPOGEN w/ HD  - Iron supplementation, 300 mg daily   HD in AM    #HTN  - Coreg 25 q12h  - Hydralazine 100 mg TID  - Norvasc 10 mg/D  - Isordil would be next agent added  BPs Stable    #NSTEMI, Demand Ischemia, resolved  - Initially, troponins mildly elevated  - TTE (4/30): EF 72, normal, no regional wall motion abnormalities   - No need to pursue cath at this time   - 20 mg lipitor at bedtime     #IDDM2, A1c 6.9  - NPH 4u q6h  - FS, ISS TIDAC  Glucoses OK with coverage    #R Common Iliac DVT  - Initially treated with heparin but then developed questionable GIB, transitioned to Eliquis   - IR was consulted, no plan for IVC filter  - Continue Eliquis 5 mg BID     #GI  - Concern melena 5/27, resolved, transfused   - GI consulted, not candidate for endoscopy due to surgical risk  - Concern for upper GI Bleed, GI felt not true bleed  - Continue PPI BID   - MIralax 17g daily  - Senna 2 @ bedtime   - Ducolax suppository daily PRN   last BM 6/25    #Oropharyngeal Dysphagia  #s/p PEG (5/17)  - Failed green dye 6/1   - Continue feeds: Card Steady 10 cc/hr incr. q6 goal 45cc/hr - feeds changed to Bolus- tolerating no signs of aspiration  [facility] 6/20- cleared for minced moist with mild thicks  supplement PO with G feeds if needed  speech to advance diet      #Mood / Cognition:  - Neuropsych evaluation given prolonged and complex hospitalization  - Chart mentions concern for depression w/ possible suicidal ideation   tolerating Provigil  Acute hallucinations overnight- Provigil DCd- Psych saw patient- acute delirium - seroquel HS  assess for antidepressant as well?    #/Bladder:  - Check urinary status on arrival, possibly anuric   spont voiding    #Dysphagia:    - Diet: tube feeds/ minced moist with mild thicks  SP MBS  - Ongoing SLP assessment- speech to advance diet  - Nutrition to follow    #Skin/ Pressure Injury Prevention:  - Assessment on admission   - Incisions:    #Precautions/ Restrictions  - Falls, Aspiration Precautions   - COVID PCR:

## 2024-06-28 LAB
ALBUMIN SERPL ELPH-MCNC: 2.7 G/DL — LOW (ref 3.3–5)
ANION GAP SERPL CALC-SCNC: 9 MMOL/L — SIGNIFICANT CHANGE UP (ref 5–17)
BUN SERPL-MCNC: 41 MG/DL — HIGH (ref 7–23)
CALCIUM SERPL-MCNC: 8.7 MG/DL — SIGNIFICANT CHANGE UP (ref 8.4–10.5)
CHLORIDE SERPL-SCNC: 96 MMOL/L — SIGNIFICANT CHANGE UP (ref 96–108)
CO2 SERPL-SCNC: 30 MMOL/L — SIGNIFICANT CHANGE UP (ref 22–31)
CREAT SERPL-MCNC: 5.45 MG/DL — HIGH (ref 0.5–1.3)
EGFR: 11 ML/MIN/1.73M2 — LOW
GLUCOSE BLDC GLUCOMTR-MCNC: 144 MG/DL — HIGH (ref 70–99)
GLUCOSE BLDC GLUCOMTR-MCNC: 146 MG/DL — HIGH (ref 70–99)
GLUCOSE BLDC GLUCOMTR-MCNC: 209 MG/DL — HIGH (ref 70–99)
GLUCOSE BLDC GLUCOMTR-MCNC: 270 MG/DL — HIGH (ref 70–99)
GLUCOSE BLDC GLUCOMTR-MCNC: 287 MG/DL — HIGH (ref 70–99)
GLUCOSE SERPL-MCNC: 259 MG/DL — HIGH (ref 70–99)
HCT VFR BLD CALC: 29.3 % — LOW (ref 39–50)
HGB BLD-MCNC: 9.2 G/DL — LOW (ref 13–17)
MCHC RBC-ENTMCNC: 22.7 PG — LOW (ref 27–34)
MCHC RBC-ENTMCNC: 31.4 GM/DL — LOW (ref 32–36)
MCV RBC AUTO: 72.2 FL — LOW (ref 80–100)
NRBC # BLD: 0 /100 WBCS — SIGNIFICANT CHANGE UP (ref 0–0)
PHOSPHATE SERPL-MCNC: 3.2 MG/DL — SIGNIFICANT CHANGE UP (ref 2.5–4.5)
PLATELET # BLD AUTO: 303 K/UL — SIGNIFICANT CHANGE UP (ref 150–400)
POTASSIUM SERPL-MCNC: 3.6 MMOL/L — SIGNIFICANT CHANGE UP (ref 3.5–5.3)
POTASSIUM SERPL-SCNC: 3.6 MMOL/L — SIGNIFICANT CHANGE UP (ref 3.5–5.3)
RBC # BLD: 4.06 M/UL — LOW (ref 4.2–5.8)
RBC # FLD: 20.8 % — HIGH (ref 10.3–14.5)
SODIUM SERPL-SCNC: 135 MMOL/L — SIGNIFICANT CHANGE UP (ref 135–145)
WBC # BLD: 10.6 K/UL — HIGH (ref 3.8–10.5)
WBC # FLD AUTO: 10.6 K/UL — HIGH (ref 3.8–10.5)

## 2024-06-28 PROCEDURE — 99232 SBSQ HOSP IP/OBS MODERATE 35: CPT

## 2024-06-28 PROCEDURE — 99232 SBSQ HOSP IP/OBS MODERATE 35: CPT | Mod: GC

## 2024-06-28 RX ORDER — DEXTROSE 30 % IN WATER 30 %
15 VIAL (ML) INTRAVENOUS ONCE
Refills: 0 | Status: DISCONTINUED | OUTPATIENT
Start: 2024-06-28 | End: 2024-07-10

## 2024-06-28 RX ORDER — PANTOPRAZOLE SODIUM 40 MG/10ML
40 INJECTION, POWDER, FOR SOLUTION INTRAVENOUS ONCE
Refills: 0 | Status: COMPLETED | OUTPATIENT
Start: 2024-06-28 | End: 2024-06-28

## 2024-06-28 RX ORDER — INSULIN LISPRO 100 [IU]/ML
2 INJECTION, SOLUTION SUBCUTANEOUS ONCE
Refills: 0 | Status: COMPLETED | OUTPATIENT
Start: 2024-06-28 | End: 2024-06-28

## 2024-06-28 RX ORDER — APIXABAN 5 MG/1
5 TABLET, FILM COATED ORAL ONCE
Refills: 0 | Status: COMPLETED | OUTPATIENT
Start: 2024-06-28 | End: 2024-06-28

## 2024-06-28 RX ORDER — MODAFINIL 100 MG/1
50 TABLET ORAL
Refills: 0 | Status: DISCONTINUED | OUTPATIENT
Start: 2024-06-28 | End: 2024-07-03

## 2024-06-28 RX ORDER — CARVEDILOL PHOSPHATE 80 MG/1
25 CAPSULE, EXTENDED RELEASE ORAL ONCE
Refills: 0 | Status: COMPLETED | OUTPATIENT
Start: 2024-06-28 | End: 2024-06-28

## 2024-06-28 RX ADMIN — Medication 2.5 MILLIGRAM(S): at 08:32

## 2024-06-28 RX ADMIN — APIXABAN 5 MILLIGRAM(S): 5 TABLET, FILM COATED ORAL at 12:30

## 2024-06-28 RX ADMIN — INSULIN LISPRO 2 UNIT(S): 100 INJECTION, SOLUTION SUBCUTANEOUS at 21:52

## 2024-06-28 RX ADMIN — LIDOCAINE HCL 1 PATCH: 28 GEL TOPICAL at 18:15

## 2024-06-28 RX ADMIN — Medication 2.5 MILLIGRAM(S): at 22:38

## 2024-06-28 RX ADMIN — Medication 1 APPLICATION(S): at 06:48

## 2024-06-28 RX ADMIN — LIDOCAINE HCL 1 PATCH: 28 GEL TOPICAL at 18:16

## 2024-06-28 RX ADMIN — Medication 300 MILLIGRAM(S): at 12:29

## 2024-06-28 RX ADMIN — Medication 2.5 MILLIGRAM(S): at 05:28

## 2024-06-28 RX ADMIN — MENTHOL, METHYL SALICYLATE 1 APPLICATION(S): 63; 210 PATCH PERCUTANEOUS; TOPICAL; TRANSDERMAL at 06:48

## 2024-06-28 RX ADMIN — CARVEDILOL PHOSPHATE 25 MILLIGRAM(S): 80 CAPSULE, EXTENDED RELEASE ORAL at 18:08

## 2024-06-28 RX ADMIN — Medication 1 TABLET(S): at 12:29

## 2024-06-28 RX ADMIN — MENTHOL, METHYL SALICYLATE 1 APPLICATION(S): 63; 210 PATCH PERCUTANEOUS; TOPICAL; TRANSDERMAL at 14:10

## 2024-06-28 RX ADMIN — HYDRALAZINE HYDROCHLORIDE 100 MILLIGRAM(S): 50 TABLET ORAL at 13:58

## 2024-06-28 RX ADMIN — APIXABAN 5 MILLIGRAM(S): 5 TABLET, FILM COATED ORAL at 18:08

## 2024-06-28 RX ADMIN — ERYTHROPOIETIN 10000 UNIT(S): 4000 INJECTION, SOLUTION INTRAVENOUS; SUBCUTANEOUS at 15:36

## 2024-06-28 RX ADMIN — CARVEDILOL PHOSPHATE 25 MILLIGRAM(S): 80 CAPSULE, EXTENDED RELEASE ORAL at 12:30

## 2024-06-28 RX ADMIN — Medication 2 TABLET(S): at 21:39

## 2024-06-28 RX ADMIN — INSULIN LISPRO 2: 100 INJECTION, SOLUTION SUBCUTANEOUS at 12:28

## 2024-06-28 RX ADMIN — ASPIRIN 81 MILLIGRAM(S): 325 TABLET, FILM COATED ORAL at 12:30

## 2024-06-28 RX ADMIN — LIDOCAINE HCL 1 PATCH: 28 GEL TOPICAL at 19:00

## 2024-06-28 RX ADMIN — PANTOPRAZOLE SODIUM 40 MILLIGRAM(S): 40 INJECTION, POWDER, FOR SOLUTION INTRAVENOUS at 12:30

## 2024-06-28 RX ADMIN — PANTOPRAZOLE SODIUM 40 MILLIGRAM(S): 40 INJECTION, POWDER, FOR SOLUTION INTRAVENOUS at 05:36

## 2024-06-28 RX ADMIN — Medication 12.5 MILLIGRAM(S): at 21:40

## 2024-06-28 RX ADMIN — ATORVASTATIN CALCIUM 20 MILLIGRAM(S): 20 TABLET, FILM COATED ORAL at 21:40

## 2024-06-28 RX ADMIN — HYDRALAZINE HYDROCHLORIDE 100 MILLIGRAM(S): 50 TABLET ORAL at 21:40

## 2024-06-28 NOTE — PROGRESS NOTE ADULT - SUBJECTIVE AND OBJECTIVE BOX
73-year-old male w/ past medical history hypertension, ESRD (HD MWF)  insulin-dependent DM presented 4/29/24 to [facility] with hiccups, chest pain of several days duration, admitted for concern demand ischemia. Early on, he was given baclofen for his hiccups, but soon developed AMS with 2 RRTs by 5/2/24, prompting intubation, upgrade to MICU. There was concern that baclofen toxicity was the culprit of his then-encephalopathic state, as patient had also missed a HD session due to a clotted fistula.     In coming days, he was treated empirically with 5 days of zosyn, had a negative CTH and LP, and had an EEG that did not capture a seizure but showed diffuse non-specific cerebral dysfunction - c/w toxic-metabolic encephalopathies. On 5/6, an MRI showed that the patient had sustained R pontine and cerebellar stroke. He was extubated on 5/9, but owing to a challenging extubation became septic w/ B Cereus, treated with Vanc. He had trach placed 5/14, PEG 5/17, then downgraded.     On the floors, he was followed by several services, including Neurology, who mention the strokes were embolic appearing. Subsequent hospital course notable for nonocclusive R common iliac DVT, for which he was started on a heparin drip which was stopped when patient developed an upper GI Bleed, though GI was consulted and felt he did not have an overt bleed. His dialysis fistula site was stenotic, s/p fistulogram, ultimately changed to a RUE AVF 6/5. He was ultimately started on Eliquis 6/7 for the DVT. Medically stable, he is admitted to [facility] on 6/14/24.     ROS/subjective:  patient seen and evaluated bedside  claims poor sleep- received seroquel  refuses meds  no hallucinations   provigil DCd  trach capped- sats> 95%  coughing up secretions through mouth-  Knee pain better with arden sharma  reported right foot pain last night- now resolved  Voiding, moving Bowels- last BM 6/27  no dizziness, no headaches, no nausea, no vomiting, no SOB, no chest pain      MEDICATIONS  (STANDING):  albuterol    0.083% 2.5 milliGRAM(s) Nebulizer every 6 hours  amLODIPine   Tablet 10 milliGRAM(s) Oral daily  apixaban 5 milliGRAM(s) Enteral Tube two times a day  aspirin  chewable 81 milliGRAM(s) Oral daily  atorvastatin 20 milliGRAM(s) Oral at bedtime  carvedilol 25 milliGRAM(s) Oral every 12 hours  chlorhexidine 2% Cloths 1 Application(s) Topical <User Schedule>  dextrose 10% Bolus 125 milliLiter(s) IV Bolus once  dextrose 5%. 1000 milliLiter(s) (100 mL/Hr) IV Continuous <Continuous>  dextrose 5%. 1000 milliLiter(s) (50 mL/Hr) IV Continuous <Continuous>  dextrose 50% Injectable 25 Gram(s) IV Push once  dextrose 50% Injectable 12.5 Gram(s) IV Push once  epoetin reilly-epbx (RETACRIT) Injectable 33520 Unit(s) IV Push <User Schedule>  ferrous    sulfate Liquid 300 milliGRAM(s) Enteral Tube daily  glucagon  Injectable 1 milliGRAM(s) IntraMuscular once  hydrALAZINE 100 milliGRAM(s) Oral three times a day  insulin lispro (ADMELOG) corrective regimen sliding scale   SubCutaneous three times a day before meals  lidocaine   4% Patch 1 Patch Transdermal every 24 hours  lidocaine   4% Patch 1 Patch Transdermal every 24 hours  methyl salicylate 15%/menthol 10% Topical Cream 1 Application(s) Topical <User Schedule>  Nephro-noam 1 Tablet(s) Oral daily  pantoprazole   Suspension 40 milliGRAM(s) Enteral Tube two times a day  polyethylene glycol 3350 17 Gram(s) Oral daily  QUEtiapine 12.5 milliGRAM(s) Oral at bedtime  senna 2 Tablet(s) Oral at bedtime    MEDICATIONS  (PRN):  acetaminophen   Oral Liquid .. 650 milliGRAM(s) Oral every 6 hours PRN Moderate Pain (4 - 6)  bisacodyl Suppository 10 milliGRAM(s) Rectal daily PRN Constipation  dextrose Oral Gel 15 Gram(s) Oral once PRN Blood Glucose LESS THAN 70 milliGRAM(s)/deciliter  melatonin 3 milliGRAM(s) Oral at bedtime PRN Insomnia                            8.9    7.88  )-----------( 286      ( 26 Jun 2024 18:30 )             27.7               CAPILLARY BLOOD GLUCOSE      POCT Blood Glucose.: 146 mg/dL (28 Jun 2024 08:05)  POCT Blood Glucose.: 116 mg/dL (27 Jun 2024 17:27)  POCT Blood Glucose.: 220 mg/dL (27 Jun 2024 11:55)      Vital Signs Last 24 Hrs  T(C): 36.4 (28 Jun 2024 08:10), Max: 36.7 (27 Jun 2024 20:26)  T(F): 97.6 (28 Jun 2024 08:10), Max: 98.1 (27 Jun 2024 20:26)  HR: 76 (28 Jun 2024 09:20) (59 - 76)  BP: 153/75 (28 Jun 2024 08:10) (93/44 - 157/68)  BP(mean): --  RR: 16 (28 Jun 2024 08:10) (14 - 16)  SpO2: 95% (28 Jun 2024 09:20) (94% - 99%)    Parameters below as of 28 Jun 2024 09:20  Patient On (Oxygen Delivery Method): trach capped      Constitutional - NAD, Comfortable with trach collar capped  HEENT - NCAT,   Neck - Supple, No limited ROM  Chest - good chest expansion, good respiratory effort  Cardio - warm and well perfused,   Abdomen -  Soft, NTND. + peg.   Extremities - No peripheral edema, No calf tenderness. RUE fistula.   Neurologic Exam:                    Cognitive -   Withdrawn this AM- reports poor sleep- refuses meds/ Tx     Speech - Fluent, but minimal output     Cranial Nerves - No facial asymmetry, Tongue midline, Shoulder shrug intact no facial droop     Motor -                      LEFT    UE - ShAB 4/5, EF 4/5, EE 3/5, WE 4/5,  WNL                    RIGHT UE - ShAB 4/5, EF 4/5, EE 3/5, WE 4/5,  WNL                    LEFT    LE - HF 3/5, KE 4/5, DF 4/5, PF 4/5 Flexes BLES today- no pain                    RIGHT LE - HF 3/5, KE 4/5, DF 4/5, PF 4/5        Sensory - Intact to LT bilateral in face, arm and legs.      Reflexes - DTR 1 / 4 biceps symmetric. neg Latham's b/l, No clonus.  Psychiatric - Mood stable, Affect WNL  Skin on admission: no sacral pressure injuries.      IDT in [facility] 6/26  Tentative DC 7/9 to home- ? KIMI

## 2024-06-28 NOTE — PROGRESS NOTE ADULT - ASSESSMENT
73-year-old male w/ past medical history hypertension, ESRD (HD MWF)  insulin-dependent DM presented 4/29/24 to [facility] 4/29 with hiccups and demand ischemia, was treated with baclofen, developed what appears to be toxic encephalopathy. He then sustained a prolonged, complex hospital course over approx 6 weeks, notable for acute respiratory failure, intubation, sepsis, CRRT, DVT, GIB, trach and PEG placement, He is admitted for acute multidisciplinary rehab on 6/14/24 to [facility].     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 Pulmonary to DC  - Respiratory to follow in house    #ESRD on HD  #Fistula stenosis   #Anemia   - Continue HD M/W/F per renal   - Access is RUE AVF  - Regular monitoring of electrolytes  - EPOGEN w/ HD  - Iron supplementation, 300 mg daily   HD today    #HTN  - Coreg 25 q12h  - Hydralazine 100 mg TID  - Norvasc 10 mg/D  - Isordil would be next agent added  BPs Stable    #NSTEMI, Demand Ischemia, resolved  - Initially, troponins mildly elevated  - TTE (4/30): EF 72, normal, no regional wall motion abnormalities   - No need to pursue cath at this time   - 20 mg lipitor at bedtime     #IDDM2, A1c 6.9  - NPH 4u q6h  - FS, ISS TIDAC  Glucoses OK with coverage    #R Common Iliac DVT  - Initially treated with heparin but then developed questionable GIB, transitioned to Eliquis   - IR was consulted, no plan for IVC filter  - Continue Eliquis 5 mg BID     #GI  - Concern melena 5/27, resolved, transfused   - GI consulted, not candidate for endoscopy due to surgical risk  - Concern for upper GI Bleed, GI felt not true bleed  - Continue PPI BID   - MIralax 17g daily  - Senna 2 @ bedtime   - Ducolax suppository daily PRN   last BM 6/27    #Oropharyngeal Dysphagia  #s/p PEG (5/17)  - Failed green dye 6/1   - Continue feeds: Card Steady 10 cc/hr incr. q6 goal 45cc/hr - feeds changed to Bolus- tolerating no signs of aspiration  [facility] 6/20- cleared for minced moist with mild thicks  supplement PO with G feeds if needed  speech to advance diet      #Mood / Cognition:  - Neuropsych evaluation given prolonged and complex hospitalization  - Chart mentions concern for depression w/ possible suicidal ideation   no further hallucinations  took Seroquel last night- claims poor sleep-  refuses meds/ foods/ therapy  wife has refused using antidepressant  will resume Provigil in AM only    #/Bladder:  - Check urinary status on arrival, possibly anuric   spont voiding    #Dysphagia:    - Diet: tube feeds/ minced moist with mild thicks  SP [facility]  - Ongoing SLP assessment- speech to advance diet  - Nutrition to follow    #Skin/ Pressure Injury Prevention:  - Assessment on admission   - Incisions:    #Precautions/ Restrictions  - Falls, Aspiration Precautions   - COVID PCR:

## 2024-06-28 NOTE — CHART NOTE - NSCHARTNOTEFT_GEN_A_CORE
Approached patient at bedside for brief follow-up session. Neuropsychologist is reintroduced and purpose of session explained, as patient indicates he does not recall previously meeting with this writer.     Patient is sitting upright in bed, indicates he feels tired and had pain last night, and is looking to rest this morning. Denies recent hallucinations.      Session is ended. Neuropsychology remains available.

## 2024-06-28 NOTE — PROGRESS NOTE ADULT - SUBJECTIVE AND OBJECTIVE BOX
Pt reports that she is doing well and denies vaginal bleeding, cramping, contractions or LOF at this time. Reports active fetal movement. Reviewed when to call OB office or present to L&D for evaluation with symptoms such as decreased fetal movement, vaginal bleeding, LOF or ctxs. Pt verbalized understanding. Denies HA, visual changes or epigastric pain. Denies any additional complaints at time of appointment. Next OB appointment scheduled for 04/22/2024    Vitals:    04/15/24 1100   BP: 111/62   Pulse: 89   Temp: 97.7 °F (36.5 °C)         Time of Visit:  Patient seen and examined. pat is awake . trach capped     MEDICATIONS  (STANDING):  albuterol    0.083% 2.5 milliGRAM(s) Nebulizer every 6 hours  amLODIPine   Tablet 10 milliGRAM(s) Oral daily  apixaban 5 milliGRAM(s) Enteral Tube two times a day  aspirin  chewable 81 milliGRAM(s) Oral daily  atorvastatin 20 milliGRAM(s) Oral at bedtime  carvedilol 25 milliGRAM(s) Oral every 12 hours  chlorhexidine 2% Cloths 1 Application(s) Topical <User Schedule>  dextrose 10% Bolus 125 milliLiter(s) IV Bolus once  dextrose 5%. 1000 milliLiter(s) (100 mL/Hr) IV Continuous <Continuous>  dextrose 5%. 1000 milliLiter(s) (50 mL/Hr) IV Continuous <Continuous>  dextrose 50% Injectable 25 Gram(s) IV Push once  dextrose 50% Injectable 12.5 Gram(s) IV Push once  epoetin reilly-epbx (RETACRIT) Injectable 55182 Unit(s) IV Push <User Schedule>  ferrous    sulfate Liquid 300 milliGRAM(s) Enteral Tube daily  glucagon  Injectable 1 milliGRAM(s) IntraMuscular once  hydrALAZINE 100 milliGRAM(s) Oral three times a day  insulin lispro (ADMELOG) corrective regimen sliding scale   SubCutaneous three times a day before meals  lidocaine   4% Patch 1 Patch Transdermal every 24 hours  lidocaine   4% Patch 1 Patch Transdermal every 24 hours  methyl salicylate 15%/menthol 10% Topical Cream 1 Application(s) Topical <User Schedule>  modafinil 50 milliGRAM(s) Oral <User Schedule>  Nephro-noam 1 Tablet(s) Oral daily  pantoprazole   Suspension 40 milliGRAM(s) Enteral Tube two times a day  polyethylene glycol 3350 17 Gram(s) Oral daily  QUEtiapine 12.5 milliGRAM(s) Oral at bedtime  senna 2 Tablet(s) Oral at bedtime      MEDICATIONS  (PRN):  acetaminophen   Oral Liquid .. 650 milliGRAM(s) Oral every 6 hours PRN Moderate Pain (4 - 6)  bisacodyl Suppository 10 milliGRAM(s) Rectal daily PRN Constipation  dextrose Oral Gel 15 Gram(s) Oral once PRN Blood Glucose LESS THAN 70 milliGRAM(s)/deciliter  melatonin 3 milliGRAM(s) Oral at bedtime PRN Insomnia       Medications up to date at time of exam.      PHYSICAL EXAMINATION:  Patient has no new complaints.  GENERAL: The patient  in no apparent distress.     Vital Signs Last 24 Hrs  T(C): 36.4 (28 Jun 2024 08:10), Max: 36.7 (27 Jun 2024 20:26)  T(F): 97.6 (28 Jun 2024 08:10), Max: 98.1 (27 Jun 2024 20:26)  HR: 63 (28 Jun 2024 13:58) (59 - 76)  BP: 127/66 (28 Jun 2024 13:58) (127/66 - 157/68)  BP(mean): --  RR: 16 (28 Jun 2024 12:32) (14 - 16)  SpO2: 95% (28 Jun 2024 13:58) (94% - 99%)    Parameters below as of 28 Jun 2024 13:58  Patient On (Oxygen Delivery Method): trach capped       (if applicable)    Chest Tube (if applicable)    HEENT: Head is normocephalic and atraumatic. Extraocular muscles are intact. Mucous membranes are moist.     NECK: Supple, no palpable adenopathy. + trach capped     LUNGS: Fair bilateral air entry   no wheezing, rales, or rhonchi.    HEART: Regular rate and rhythm without murmur.    ABDOMEN: Soft, nontender, and nondistended.  No hepatosplenomegaly is noted.    : No painful voiding, no pelvic pain    EXTREMITIES: Without any cyanosis, clubbing, rash, lesions or edema.    NEUROLOGIC: Awake,      SKIN: Warm, dry, good turgor.      LABS:                        9.2    10.60 )-----------( 303      ( 28 Jun 2024 14:00 )             29.3     06-28    135  |  96  |  41<H>  ----------------------------<  259<H>  3.6   |  30  |  5.45<H>    Ca    8.7      28 Jun 2024 14:00  Phos  3.2     06-28    TPro  x   /  Alb  2.7<L>  /  TBili  x   /  DBili  x   /  AST  x   /  ALT  x   /  AlkPhos  x   06-28      Urinalysis Basic - ( 28 Jun 2024 14:00 )    Color: x / Appearance: x / SG: x / pH: x  Gluc: 259 mg/dL / Ketone: x  / Bili: x / Urobili: x   Blood: x / Protein: x / Nitrite: x   Leuk Esterase: x / RBC: x / WBC x   Sq Epi: x / Non Sq Epi: x / Bacteria: x    MICROBIOLOGY: (if applicable)    RADIOLOGY & ADDITIONAL STUDIES:  EKG:   CXR:  ECHO:    IMPRESSION: 73y Male PAST MEDICAL & SURGICAL HISTORY:  Diabetes      Benign essential HTN      HLD (hyperlipidemia)      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      ESRD on hemodialysis      Arteriovenous fistula       p/w         IMP: This is a 73 yr old man with HTN, ESRD (HD MWF via AVG) DM who was admitted for unstable angina further c/b altered mental status requiring intubation with CRRT requriment, acute CVA vs baclofen toxicity, DVT and GIB s/p PEG/trach. Notable course for AV Fistula dysfunction requiring balloon angioplasty. Pat trach is cap  during the day while awake as per speech path       Assessment  1. Chronic Respiratory failure s/p Trach  2. AMS appears to have improved , ? Baclofen toxicity vs CVA  3. ESRD on HD, DM 2,   4. Right iliac DVT on Eliquis      Plan  - Advance trach capping from 7 am - 11pm  while monitoring pulse oximetry   - Oral suction , minimize trach suction   - Duoneb q6h   - PEG feed   - Neph for HD   - Scopolamine patch

## 2024-06-28 NOTE — PROGRESS NOTE ADULT - SUBJECTIVE AND OBJECTIVE BOX
Patient is a 73y old  Male who presents with a chief complaint of CVA, Encephalopathy (28 Jun 2024 11:41)      Subjective and overnight events:  Patient seen and examined at bedside. pt with no new complaints. denies fever, chills, sob, cp, abd pain. denies headache, dizziness.    ALLERGIES:  No Known Allergies    MEDICATIONS  (STANDING):  albuterol    0.083% 2.5 milliGRAM(s) Nebulizer every 6 hours  amLODIPine   Tablet 10 milliGRAM(s) Oral daily  apixaban 5 milliGRAM(s) Enteral Tube two times a day  aspirin  chewable 81 milliGRAM(s) Oral daily  atorvastatin 20 milliGRAM(s) Oral at bedtime  carvedilol 25 milliGRAM(s) Oral every 12 hours  chlorhexidine 2% Cloths 1 Application(s) Topical <User Schedule>  dextrose 10% Bolus 125 milliLiter(s) IV Bolus once  dextrose 5%. 1000 milliLiter(s) (100 mL/Hr) IV Continuous <Continuous>  dextrose 5%. 1000 milliLiter(s) (50 mL/Hr) IV Continuous <Continuous>  dextrose 50% Injectable 25 Gram(s) IV Push once  dextrose 50% Injectable 12.5 Gram(s) IV Push once  epoetin reilly-epbx (RETACRIT) Injectable 68214 Unit(s) IV Push <User Schedule>  ferrous    sulfate Liquid 300 milliGRAM(s) Enteral Tube daily  glucagon  Injectable 1 milliGRAM(s) IntraMuscular once  hydrALAZINE 100 milliGRAM(s) Oral three times a day  insulin lispro (ADMELOG) corrective regimen sliding scale   SubCutaneous three times a day before meals  lidocaine   4% Patch 1 Patch Transdermal every 24 hours  lidocaine   4% Patch 1 Patch Transdermal every 24 hours  methyl salicylate 15%/menthol 10% Topical Cream 1 Application(s) Topical <User Schedule>  modafinil 50 milliGRAM(s) Oral <User Schedule>  Nephro-noam 1 Tablet(s) Oral daily  pantoprazole   Suspension 40 milliGRAM(s) Enteral Tube two times a day  polyethylene glycol 3350 17 Gram(s) Oral daily  QUEtiapine 12.5 milliGRAM(s) Oral at bedtime  senna 2 Tablet(s) Oral at bedtime    MEDICATIONS  (PRN):  acetaminophen   Oral Liquid .. 650 milliGRAM(s) Oral every 6 hours PRN Moderate Pain (4 - 6)  bisacodyl Suppository 10 milliGRAM(s) Rectal daily PRN Constipation  dextrose Oral Gel 15 Gram(s) Oral once PRN Blood Glucose LESS THAN 70 milliGRAM(s)/deciliter  melatonin 3 milliGRAM(s) Oral at bedtime PRN Insomnia    Vital Signs Last 24 Hrs  T(F): 97.6 (28 Jun 2024 08:10), Max: 98.1 (27 Jun 2024 20:26)  HR: 63 (28 Jun 2024 13:58) (59 - 76)  BP: 127/66 (28 Jun 2024 13:58) (127/66 - 157/68)  RR: 16 (28 Jun 2024 12:32) (14 - 16)  SpO2: 95% (28 Jun 2024 13:58) (94% - 99%)  I&O's Summary    27 Jun 2024 07:01  -  28 Jun 2024 07:00  --------------------------------------------------------  IN: 147 mL / OUT: 0 mL / NET: 147 mL    28 Jun 2024 07:01  -  28 Jun 2024 14:35  --------------------------------------------------------  IN: 474 mL / OUT: 0 mL / NET: 474 mL      PHYSICAL EXAM:  General: NAD, A/O x 1-2 (knows his name and knows he is in a hospital but needed prompting)  ENT: MMM  Neck: Supple, No JVD + trach  Lungs: Clear to auscultation bilaterally  Cardio: RRR, S1/S2, No murmurs  Abdomen: Soft, Nontender, Nondistended; Bowel sounds present  Extremities: No calf tenderness, No pitting edema    LABS:                        8.9    7.88  )-----------( 286      ( 26 Jun 2024 18:30 )             27.7       05-17 Chol 86 mg/dL LDL -- HDL 27 mg/dL Trig 151 mg/dL      POCT Blood Glucose.: 209 mg/dL (28 Jun 2024 12:26)  POCT Blood Glucose.: 146 mg/dL (28 Jun 2024 08:05)  POCT Blood Glucose.: 116 mg/dL (27 Jun 2024 17:27)        COVID-19 PCR: Omayra (06-14-24 @ 18:56)      RADIOLOGY & ADDITIONAL TESTS:    Care Discussed with Consultants/Other Providers:

## 2024-06-28 NOTE — PROGRESS NOTE ADULT - ASSESSMENT
73M HTN, ESRD (HD MWF via AVG) DM who was admitted for unstable angina further c/b altered mental status requiring intubation with CRRT requriment, acute CVA vs baclofen toxicity, DVT and GIB s/p PEG/trach. Notable course for AV Fistula dysfunction requiring balloon angioplasty. Now in AR.     Ischemic CVA vs toxic encephalopathy  - continue holding baclofen  - ASA/Statin    DM2 with hypoglycemia 6/23  - hypoglycemia resolved   - A1c 6.9  - lantus was discontinued on 6/24  - continue ISS    Acute Hypoxemic, Hypercapnic Respiratory Failure, s/p ETT intubation  #increased secretions, sialorrhea  - scopolamine patch  - s/p tracheostomy   - Albuterol neb q6h   - per pulm, continue to cap trach during the day while awake as tolerate with speech path monitoring     ESRD on HD MWF via RT AFF  - stable, continue HD    Hallucination  - Seroquel 12.5mg HS  - Psych following    HTN  - Coreg 25 q12h  - Hydralazine 100 mg TID  - Norvasc 10 mg/D  - BP acceptable     R Common Iliac DVT  - CT AP (5/12) with nonocclusive RIGHT common iliac vein deep venous thrombosis  - Continue Eliquis 5 mg BID     #Oropharyngeal Dysphagia  - s/p PEG (5/17)  - Continue TF    DVT PPX: Eliquis

## 2024-06-28 NOTE — PROGRESS NOTE ADULT - SUBJECTIVE AND OBJECTIVE BOX
NEPHROLOGY PROGRESS NOTE    CHIEF COMPLAINT:  ESRD    HPI:  Seen on dialysis.  Access working well.  's.  Has deep wet cough.    EXAM:  Vital Signs Last 24 Hrs  T(C): 36.4 (28 Jun 2024 08:10), Max: 36.7 (27 Jun 2024 20:26)  T(F): 97.6 (28 Jun 2024 08:10), Max: 98.1 (27 Jun 2024 20:26)  HR: 63 (28 Jun 2024 13:58) (59 - 76)  BP: 127/66 (28 Jun 2024 13:58) (127/66 - 157/68)  BP(mean): --  RR: 16 (28 Jun 2024 12:32) (14 - 16)  SpO2: 95% (28 Jun 2024 13:58) (94% - 99%)    Parameters below as of 28 Jun 2024 13:58  Patient On (Oxygen Delivery Method): trach capped      I&O's Summary    27 Jun 2024 07:01  -  28 Jun 2024 07:00  --------------------------------------------------------  IN: 147 mL / OUT: 0 mL / NET: 147 mL    28 Jun 2024 07:01  -  28 Jun 2024 14:24  --------------------------------------------------------  IN: 474 mL / OUT: 0 mL / NET: 474 mL      Awake, alert, in no distress  Normal respiratory effort, lungs rhonchi  Heart RRR with no murmur, no peripheral edema    LABS                        8.9    7.88  )-----------( 286      ( 26 Jun 2024 18:30 )             27.7       Impression:  ESRD on HD MWF  S/p complicated hospital course as per HPI  S/p CVA, trach, PEG    Recommend:  Complete HD with minimal UF  Epoetin w/HD 3 x week

## 2024-06-29 LAB
GLUCOSE BLDC GLUCOMTR-MCNC: 170 MG/DL — HIGH (ref 70–99)
GLUCOSE BLDC GLUCOMTR-MCNC: 192 MG/DL — HIGH (ref 70–99)
GLUCOSE BLDC GLUCOMTR-MCNC: 230 MG/DL — HIGH (ref 70–99)
GLUCOSE BLDC GLUCOMTR-MCNC: 318 MG/DL — HIGH (ref 70–99)

## 2024-06-29 PROCEDURE — 71045 X-RAY EXAM CHEST 1 VIEW: CPT | Mod: 26

## 2024-06-29 PROCEDURE — 99232 SBSQ HOSP IP/OBS MODERATE 35: CPT

## 2024-06-29 PROCEDURE — 99233 SBSQ HOSP IP/OBS HIGH 50: CPT

## 2024-06-29 RX ORDER — PIPERACILLIN SODIUM AND TAZOBACTAM SODIUM 3; .375 G/15ML; G/15ML
3.38 INJECTION, POWDER, LYOPHILIZED, FOR SOLUTION INTRAVENOUS EVERY 12 HOURS
Refills: 0 | Status: DISCONTINUED | OUTPATIENT
Start: 2024-06-29 | End: 2024-07-05

## 2024-06-29 RX ORDER — INSULIN GLARGINE 100 [IU]/ML
5 INJECTION, SOLUTION SUBCUTANEOUS EVERY MORNING
Refills: 0 | Status: DISCONTINUED | OUTPATIENT
Start: 2024-06-30 | End: 2024-07-10

## 2024-06-29 RX ORDER — SODIUM CHLORIDE 0.9 % (FLUSH) 0.9 %
500 SYRINGE (ML) INJECTION ONCE
Refills: 0 | Status: COMPLETED | OUTPATIENT
Start: 2024-06-29 | End: 2024-06-29

## 2024-06-29 RX ADMIN — CARVEDILOL PHOSPHATE 25 MILLIGRAM(S): 80 CAPSULE, EXTENDED RELEASE ORAL at 17:55

## 2024-06-29 RX ADMIN — LIDOCAINE HCL 1 PATCH: 28 GEL TOPICAL at 17:49

## 2024-06-29 RX ADMIN — INSULIN LISPRO 2: 100 INJECTION, SOLUTION SUBCUTANEOUS at 11:29

## 2024-06-29 RX ADMIN — INSULIN LISPRO 1: 100 INJECTION, SOLUTION SUBCUTANEOUS at 08:34

## 2024-06-29 RX ADMIN — LIDOCAINE HCL 1 PATCH: 28 GEL TOPICAL at 06:16

## 2024-06-29 RX ADMIN — Medication 1 APPLICATION(S): at 05:58

## 2024-06-29 RX ADMIN — PANTOPRAZOLE SODIUM 40 MILLIGRAM(S): 40 INJECTION, POWDER, FOR SOLUTION INTRAVENOUS at 05:48

## 2024-06-29 RX ADMIN — Medication 2.5 MILLIGRAM(S): at 08:18

## 2024-06-29 RX ADMIN — MODAFINIL 50 MILLIGRAM(S): 100 TABLET ORAL at 06:11

## 2024-06-29 RX ADMIN — APIXABAN 5 MILLIGRAM(S): 5 TABLET, FILM COATED ORAL at 05:48

## 2024-06-29 RX ADMIN — AMLODIPINE BESYLATE 10 MILLIGRAM(S): 2.5 TABLET ORAL at 05:48

## 2024-06-29 RX ADMIN — MENTHOL, METHYL SALICYLATE 1 APPLICATION(S): 63; 210 PATCH PERCUTANEOUS; TOPICAL; TRANSDERMAL at 07:02

## 2024-06-29 RX ADMIN — Medication 650 MILLIGRAM(S): at 23:34

## 2024-06-29 RX ADMIN — Medication 2 TABLET(S): at 22:11

## 2024-06-29 RX ADMIN — Medication 500 MILLILITER(S): at 22:18

## 2024-06-29 RX ADMIN — HYDRALAZINE HYDROCHLORIDE 100 MILLIGRAM(S): 50 TABLET ORAL at 13:52

## 2024-06-29 RX ADMIN — POLYETHYLENE GLYCOL 3350 17 GRAM(S): 1 POWDER ORAL at 12:47

## 2024-06-29 RX ADMIN — APIXABAN 5 MILLIGRAM(S): 5 TABLET, FILM COATED ORAL at 17:56

## 2024-06-29 RX ADMIN — MENTHOL, METHYL SALICYLATE 1 APPLICATION(S): 63; 210 PATCH PERCUTANEOUS; TOPICAL; TRANSDERMAL at 18:03

## 2024-06-29 RX ADMIN — ASPIRIN 81 MILLIGRAM(S): 325 TABLET, FILM COATED ORAL at 12:47

## 2024-06-29 RX ADMIN — Medication 650 MILLIGRAM(S): at 23:04

## 2024-06-29 RX ADMIN — HYDRALAZINE HYDROCHLORIDE 100 MILLIGRAM(S): 50 TABLET ORAL at 05:48

## 2024-06-29 RX ADMIN — INSULIN LISPRO 4: 100 INJECTION, SOLUTION SUBCUTANEOUS at 17:48

## 2024-06-29 RX ADMIN — PANTOPRAZOLE SODIUM 40 MILLIGRAM(S): 40 INJECTION, POWDER, FOR SOLUTION INTRAVENOUS at 17:57

## 2024-06-29 RX ADMIN — Medication 2.5 MILLIGRAM(S): at 20:53

## 2024-06-29 RX ADMIN — Medication 12.5 MILLIGRAM(S): at 23:04

## 2024-06-29 RX ADMIN — ATORVASTATIN CALCIUM 20 MILLIGRAM(S): 20 TABLET, FILM COATED ORAL at 22:25

## 2024-06-29 RX ADMIN — Medication 300 MILLIGRAM(S): at 12:50

## 2024-06-29 RX ADMIN — Medication 1 TABLET(S): at 12:47

## 2024-06-29 NOTE — PROGRESS NOTE ADULT - SUBJECTIVE AND OBJECTIVE BOX
Patient is a 73y old  Male who presents with a chief complaint of s/p CVA (28 Jun 2024 14:35)      Subjective and overnight events:  Patient seen and examined at bedside. pt awake alert, answering simple questions. denies sob, cp, abd pain. admits to intermittent cough. no fever, chills.     ALLERGIES:  No Known Allergies    MEDICATIONS  (STANDING):  albuterol    0.083% 2.5 milliGRAM(s) Nebulizer every 6 hours  amLODIPine   Tablet 10 milliGRAM(s) Oral daily  apixaban 5 milliGRAM(s) Enteral Tube two times a day  aspirin  chewable 81 milliGRAM(s) Oral daily  atorvastatin 20 milliGRAM(s) Oral at bedtime  carvedilol 25 milliGRAM(s) Oral every 12 hours  chlorhexidine 2% Cloths 1 Application(s) Topical <User Schedule>  dextrose 10% Bolus 125 milliLiter(s) IV Bolus once  dextrose 5%. 1000 milliLiter(s) (50 mL/Hr) IV Continuous <Continuous>  dextrose 5%. 1000 milliLiter(s) (100 mL/Hr) IV Continuous <Continuous>  dextrose 50% Injectable 12.5 Gram(s) IV Push once  dextrose 50% Injectable 25 Gram(s) IV Push once  epoetin reilly-epbx (RETACRIT) Injectable 85657 Unit(s) IV Push <User Schedule>  ferrous    sulfate Liquid 300 milliGRAM(s) Enteral Tube daily  glucagon  Injectable 1 milliGRAM(s) IntraMuscular once  hydrALAZINE 100 milliGRAM(s) Oral three times a day  insulin lispro (ADMELOG) corrective regimen sliding scale   SubCutaneous three times a day before meals  lidocaine   4% Patch 1 Patch Transdermal every 24 hours  lidocaine   4% Patch 1 Patch Transdermal every 24 hours  methyl salicylate 15%/menthol 10% Topical Cream 1 Application(s) Topical <User Schedule>  modafinil 50 milliGRAM(s) Oral <User Schedule>  Nephro-noam 1 Tablet(s) Oral daily  pantoprazole   Suspension 40 milliGRAM(s) Enteral Tube two times a day  polyethylene glycol 3350 17 Gram(s) Oral daily  QUEtiapine 12.5 milliGRAM(s) Oral at bedtime  senna 2 Tablet(s) Oral at bedtime    MEDICATIONS  (PRN):  acetaminophen   Oral Liquid .. 650 milliGRAM(s) Oral every 6 hours PRN Moderate Pain (4 - 6)  bisacodyl Suppository 10 milliGRAM(s) Rectal daily PRN Constipation  dextrose Oral Gel 15 Gram(s) Oral once PRN Blood Glucose LESS THAN 70 milliGRAM(s)/deciliter  dextrose Oral Gel 15 Gram(s) Oral once PRN Blood Glucose LESS THAN 70 milliGRAM(s)/deciliter  melatonin 3 milliGRAM(s) Oral at bedtime PRN Insomnia    Vital Signs Last 24 Hrs  T(F): 98.9 (28 Jun 2024 20:25), Max: 98.9 (28 Jun 2024 20:25)  HR: 60 (29 Jun 2024 05:44) (60 - 71)  BP: 148/64 (29 Jun 2024 05:44) (127/66 - 156/74)  RR: 16 (28 Jun 2024 20:25) (16 - 16)  SpO2: 99% (29 Jun 2024 04:38) (94% - 99%)  I&O's Summary    28 Jun 2024 07:01  -  29 Jun 2024 07:00  --------------------------------------------------------  IN: 711 mL / OUT: 1500 mL / NET: -789 mL      PHYSICAL EXAM:  General: NAD, Awake alert. answers simple questions   ENT: MMM +trach  Neck: Supple, No JVD  Lungs: Clear to auscultation bilaterally  Cardio: RRR, S1/S2, No murmurs  Abdomen: Soft, Nontender, Nondistended; Bowel sounds present  Extremities: No calf tenderness, No pitting edema    LABS:                        9.2    10.60 )-----------( 303      ( 28 Jun 2024 14:00 )             29.3     06-28    135  |  96  |  41  ----------------------------<  259  3.6   |  30  |  5.45    Ca    8.7      28 Jun 2024 14:00  Phos  3.2     06-28    TPro  x   /  Alb  2.7  /  TBili  x   /  DBili  x   /  AST  x   /  ALT  x   /  AlkPhos  x   06-28              05-17 Chol 86 mg/dL LDL -- HDL 27 mg/dL Trig 151 mg/dL              POCT Blood Glucose.: 230 mg/dL (29 Jun 2024 11:27)  POCT Blood Glucose.: 192 mg/dL (29 Jun 2024 08:02)  POCT Blood Glucose.: 287 mg/dL (28 Jun 2024 21:31)  POCT Blood Glucose.: 270 mg/dL (28 Jun 2024 21:22)  POCT Blood Glucose.: 144 mg/dL (28 Jun 2024 17:48)  POCT Blood Glucose.: 209 mg/dL (28 Jun 2024 12:26)      Urinalysis Basic - ( 28 Jun 2024 14:00 )    Color: x / Appearance: x / SG: x / pH: x  Gluc: 259 mg/dL / Ketone: x  / Bili: x / Urobili: x   Blood: x / Protein: x / Nitrite: x   Leuk Esterase: x / RBC: x / WBC x   Sq Epi: x / Non Sq Epi: x / Bacteria: x        COVID-19 PCR: NotDetec (06-14-24 @ 18:56)      RADIOLOGY & ADDITIONAL TESTS:    Care Discussed with Consultants/Other Providers:

## 2024-06-29 NOTE — PROGRESS NOTE ADULT - SUBJECTIVE AND OBJECTIVE BOX
Follow-up Pulmonary Progress Note  Chief Complaint : Cerebral infarction      pt had issues with capping today  where noted to desat to 89% and capp removed  required suctioning afterwards  currently 97% on TC  patient comfortable when seen        Allergies :No Known Allergies      PAST MEDICAL & SURGICAL HISTORY:  Diabetes    Benign essential HTN    HLD (hyperlipidemia)    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    ESRD on hemodialysis    Arteriovenous fistula        Medications:  MEDICATIONS  (STANDING):  albuterol    0.083% 2.5 milliGRAM(s) Nebulizer every 6 hours  amLODIPine   Tablet 10 milliGRAM(s) Oral daily  apixaban 5 milliGRAM(s) Enteral Tube two times a day  aspirin  chewable 81 milliGRAM(s) Oral daily  atorvastatin 20 milliGRAM(s) Oral at bedtime  carvedilol 25 milliGRAM(s) Oral every 12 hours  chlorhexidine 2% Cloths 1 Application(s) Topical <User Schedule>  dextrose 10% Bolus 125 milliLiter(s) IV Bolus once  dextrose 5%. 1000 milliLiter(s) (50 mL/Hr) IV Continuous <Continuous>  dextrose 5%. 1000 milliLiter(s) (100 mL/Hr) IV Continuous <Continuous>  dextrose 50% Injectable 12.5 Gram(s) IV Push once  dextrose 50% Injectable 25 Gram(s) IV Push once  epoetin reilly-epbx (RETACRIT) Injectable 82676 Unit(s) IV Push <User Schedule>  ferrous    sulfate Liquid 300 milliGRAM(s) Enteral Tube daily  glucagon  Injectable 1 milliGRAM(s) IntraMuscular once  hydrALAZINE 100 milliGRAM(s) Oral three times a day  insulin lispro (ADMELOG) corrective regimen sliding scale   SubCutaneous three times a day before meals  lidocaine   4% Patch 1 Patch Transdermal every 24 hours  lidocaine   4% Patch 1 Patch Transdermal every 24 hours  methyl salicylate 15%/menthol 10% Topical Cream 1 Application(s) Topical <User Schedule>  modafinil 50 milliGRAM(s) Oral <User Schedule>  Nephro-noam 1 Tablet(s) Oral daily  pantoprazole   Suspension 40 milliGRAM(s) Enteral Tube two times a day  polyethylene glycol 3350 17 Gram(s) Oral daily  QUEtiapine 12.5 milliGRAM(s) Oral at bedtime  senna 2 Tablet(s) Oral at bedtime    MEDICATIONS  (PRN):  acetaminophen   Oral Liquid .. 650 milliGRAM(s) Oral every 6 hours PRN Moderate Pain (4 - 6)  bisacodyl Suppository 10 milliGRAM(s) Rectal daily PRN Constipation  dextrose Oral Gel 15 Gram(s) Oral once PRN Blood Glucose LESS THAN 70 milliGRAM(s)/deciliter  dextrose Oral Gel 15 Gram(s) Oral once PRN Blood Glucose LESS THAN 70 milliGRAM(s)/deciliter  melatonin 3 milliGRAM(s) Oral at bedtime PRN Insomnia      Antibiotics History      Heme Medications   apixaban 5 milliGRAM(s) Enteral Tube two times a day, 06-14-24 @ 18:29  aspirin  chewable 81 milliGRAM(s) Oral daily, 06-15-24 @ 00:00      GI Medications  bisacodyl Suppository 10 milliGRAM(s) Rectal daily, 06-14-24 @ 18:30, Routine PRN  pantoprazole   Suspension 40 milliGRAM(s) Enteral Tube two times a day, 06-14-24 @ 21:14, Routine  polyethylene glycol 3350 17 Gram(s) Oral daily, 06-15-24 @ 00:00, Routine  senna 2 Tablet(s) Oral at bedtime, 06-14-24 @ 18:29, Routine        LABS:                        9.2    10.60 )-----------( 303      ( 28 Jun 2024 14:00 )             29.3     06-28    135  |  96  |  41<H>  ----------------------------<  259<H>  3.6   |  30  |  5.45<H>    Ca    8.7      28 Jun 2024 14:00  Phos  3.2     06-28    TPro  x   /  Alb  2.7<L>  /  TBili  x   /  DBili  x   /  AST  x   /  ALT  x   /  AlkPhos  x   06-28              Urinalysis Basic - ( 28 Jun 2024 14:00 )    Color: x / Appearance: x / SG: x / pH: x  Gluc: 259 mg/dL / Ketone: x  / Bili: x / Urobili: x   Blood: x / Protein: x / Nitrite: x   Leuk Esterase: x / RBC: x / WBC x   Sq Epi: x / Non Sq Epi: x / Bacteria: x        < from: Xray Chest 1 View- PORTABLE-Routine (Xray Chest 1 View- PORTABLE-Routine .) (06.18.24 @ 08:39) >  ACC: [id number] EXAM:  XR CHEST PORTABLE ROUTINE 1V   ORDERED BY: JESUS CULP     PROCEDURE DATE:  06/18/2024          INTERPRETATION:  AP chest radiograph    COMPARISON: 5/25/2024 chest x-ray.    CLINICAL INFORMATION: Pneumonia. Follow-up..    FINDINGS:  CATHETERS AND TUBES: Tracheostomy tube in place.    PULMONARY:  Significant reduction of LEFT retrocardiac airspace disease.  RIGHT lower zone of linear subsegmental atelectasis. Remaining lung   parenchyma clear..    HEART/VASCULAR: The heart size and mediastinum configuration are within   the limits of normal.    BONES: The visualized osseous thorax is intact.    IMPRESSION:  Significant reduction of LEFT retrocardiac airspace disease. Continued   surveillance recommended to confirm complete resolution.    RIGHT lower zone of linear subsegmental atelectasis.    --- End of Report ---    < end of copied text >           VITALS:  T(C): 37.2 (06-29-24 @ 07:59), Max: 37.2 (06-28-24 @ 20:25)  T(F): 98.9 (06-29-24 @ 07:59), Max: 98.9 (06-28-24 @ 20:25)  HR: 76 (06-29-24 @ 18:00) (60 - 96)  BP: 116/58 (06-29-24 @ 18:00) (116/58 - 148/68)  BP(mean): --  ABP: --  ABP(mean): --  RR: 16 (06-29-24 @ 13:47) (16 - 16)  SpO2: 97% (06-29-24 @ 13:47) (89% - 99%)  CVP(mm Hg): --  CVP(cm H2O): --    Ins and Outs     06-28-24 @ 07:01  -  06-29-24 @ 07:00  --------------------------------------------------------  IN: 711 mL / OUT: 1500 mL / NET: -789 mL    06-29-24 @ 07:01 - 06-29-24 @ 19:14  --------------------------------------------------------  IN: 960 mL / OUT: 0 mL / NET: 960 mL                I&O's Detail    28 Jun 2024 07:01 - 29 Jun 2024 07:00  --------------------------------------------------------  IN:    Nepro: 711 mL  Total IN: 711 mL    OUT:    Other (mL): 1500 mL  Total OUT: 1500 mL    Total NET: -789 mL      29 Jun 2024 07:01  -  29 Jun 2024 19:14  --------------------------------------------------------  IN:    Free Water: 480 mL    Nepro: 480 mL  Total IN: 960 mL    OUT:  Total OUT: 0 mL    Total NET: 960 mL          Physical Examination:  GENERAL:               Alert, Oriented, No acute distress.    HEENT:                    Pupils equal, reactive to light.  Symmetric. No JVD, Moist MM  PULM:                     Bilateral air entry, Clear to auscultation bilaterally, no significant sputum production, No Rales, No Rhonchi, No Wheezing  CVS:                         S1, S2,  No Murmur  ABD:                        Soft, nondistended, nontender, normoactive bowel sounds,   EXT:                         No edema, nontender, No Cyanosis or Clubbing   Vascular:                Warm Extremities, Normal Capillary refill, Normal Distal Pulses  SKIN:                       Warm and well perfused, no rashes noted.   NEURO:                  Alert, oriented, interactive, nonfocal, follows commands  PSYC:                      Calm, + Insight.

## 2024-06-29 NOTE — PROGRESS NOTE ADULT - ASSESSMENT
Physical Exam   GENERAL:               Alert,  No acute distress.    HEENT:                   No JVD, Dry MM + trach with no secretion   PULM:                     Bilateral air entry, diminished to auscultation bilaterally, no significant sputum production, No Rales, No Rhonchi, No Wheezing  CVS:                         S1, S2,  No Murmur  ABD:                        Soft, nondistended, nontender, normoactive bowel sounds, + PEG  EXT:                         No edema, nontender, No Cyanosis or Clubbing    NEURO:                  Alert,  interactive,  follows commands  PSYC:                      Calm,      Assessment  1. Chronic Respiratory failure s/p Trach  2. AMS appears to have improved , ? Baclofen toxicity vs CVA  3. ESRD on HD, DM 2,   4. Right iliac DVT on Eliquis      Plan  Check CXR  hold capping today  can restart jaret if no secretions and use n/c o2    avoid frequent tracheal suctioning ; Oral suction ok,   trach care  o2 Viat TC to keep humidity and sat > 92%  PMV with therapies and day time hours appears to be tolerating     continue Eliquis for DVT  d/w RN   will follow

## 2024-06-29 NOTE — PROGRESS NOTE ADULT - ASSESSMENT
73M HTN, ESRD (HD MWF via AVG) DM who was admitted for unstable angina further c/b altered mental status requiring intubation with CRRT requriment, acute CVA vs baclofen toxicity, DVT and GIB s/p PEG/trach. Notable course for AV Fistula dysfunction requiring balloon angioplasty. Now in AR.     Recent toxic encephalopathy suspect due to baclofen   Acute CVA  - continue holding baclofen  - continue ASA and statin     DM2   - hypoglycemia on 6/23 resolved   - A1c 6.9  - now with intermittent hyperglycemia. will restart low dose Lantus 5unit qAM  - continue ISS    Acute Hypoxemic, Hypercapnic Respiratory Failure, s/p ETT intubation  #increased secretions, sialorrhea  - scopolamine patch  - s/p tracheostomy   - Albuterol neb q6h   - per pulm, continue to cap trach during the day while awake as tolerate with speech path monitoring   - oral suction, minimize trach suctioning     ESRD on HD MWF via RT AFF  - stable, continue HD    Hallucination  - Seroquel 12.5mg HS  - Psych following    HTN  - Coreg 25 q12h  - Hydralazine 100 mg TID  - Norvasc 10 mg/D  - BP acceptable     R Common Iliac DVT  - CT AP (5/12) with nonocclusive RIGHT common iliac vein deep venous thrombosis  - Continue Eliquis 5 mg BID     #Oropharyngeal Dysphagia  - s/p PEG (5/17)  - Continue TF    DVT PPX: Eliquis         73M HTN, ESRD (HD MWF via AVG) DM who was admitted for unstable angina further c/b altered mental status requiring intubation with CRRT requriment, acute CVA vs baclofen toxicity, DVT and GIB s/p PEG/trach. Notable course for AV Fistula dysfunction requiring balloon angioplasty. Now in AR.     Recent toxic encephalopathy suspect due to baclofen   Acute CVA  - continue holding baclofen  - continue ASA and statin     DM2   - hypoglycemia on 6/23 resolved   - A1c 6.9  - now with intermittent hyperglycemia. will restart low dose Lantus 5unit qAM  - continue ISS    Acute Hypoxemic, Hypercapnic Respiratory Failure, s/p ETT intubation  #increased secretions, sialorrhea  - scopolamine patch  - s/p tracheostomy   - Albuterol neb q6h   - per pulm, continue to cap trach during the day while awake as tolerate with speech path monitoring   - oral suction, minimize trach suctioning     ESRD on HD MWF via RT AFF  - stable, continue HD    Hallucination  - Seroquel 12.5mg HS  - Psych following    HTN  - Coreg 25 q12h  - Hydralazine 100 mg TID  - Norvasc 10 mg/D  - BP acceptable     R Common Iliac DVT  - CT AP (5/12) with nonocclusive RIGHT common iliac vein deep venous thrombosis  - Continue Eliquis 5 mg BID     #Oropharyngeal Dysphagia  - s/p PEG (5/17)  - Continue PO diet and supplement with TF if po intake is poor    DVT PPX: Eliquis

## 2024-06-29 NOTE — PROGRESS NOTE ADULT - ASSESSMENT
From primary team notes:  73-year-old male w/ past medical history hypertension, ESRD (HD MWF)  insulin-dependent DM presented 4/29/24 to [facility] 4/29 with hiccups and demand ischemia, was treated with baclofen, developed what appears to be toxic encephalopathy. He then sustained a prolonged, complex hospital course over approx 6 weeks, notable for acute respiratory failure, intubation, sepsis, CRRT, DVT, GIB, trach and PEG placement, He is admitted for acute multidisciplinary rehab on 6/14/24 to [facility].     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 Pulmonary to DC  - Respiratory to follow in house    #ESRD on HD  #Fistula stenosis   #Anemia   - Continue HD M/W/F per renal   - Access is RUE AVF  - Regular monitoring of electrolytes  - EPOGEN w/ HD  - Iron supplementation, 300 mg daily   HD today    #HTN  - Coreg 25 q12h  - Hydralazine 100 mg TID  - Norvasc 10 mg/D  - Isordil would be next agent added  BPs Stable    #NSTEMI, Demand Ischemia, resolved  - Initially, troponins mildly elevated  - TTE (4/30): EF 72, normal, no regional wall motion abnormalities   - No need to pursue cath at this time   - 20 mg lipitor at bedtime     #IDDM2, A1c 6.9  - NPH 4u q6h  - FS, ISS TIDAC  Glucoses OK with coverage    #R Common Iliac DVT  - Initially treated with heparin but then developed questionable GIB, transitioned to Eliquis   - IR was consulted, no plan for IVC filter  - Continue Eliquis 5 mg BID     #GI  - Concern melena 5/27, resolved, transfused   - GI consulted, not candidate for endoscopy due to surgical risk  - Concern for upper GI Bleed, GI felt not true bleed  - Continue PPI BID   - MIralax 17g daily  - Senna 2 @ bedtime   - Ducolax suppository daily PRN   last BM 6/27    #Oropharyngeal Dysphagia  #s/p PEG (5/17)  - Failed green dye 6/1   - Continue feeds: Card Steady 10 cc/hr incr. q6 goal 45cc/hr - feeds changed to Bolus- tolerating no signs of aspiration  [facility] 6/20- cleared for minced moist with mild thicks  supplement PO with G feeds if needed  speech to advance diet      #Mood / Cognition:  - Neuropsych evaluation given prolonged and complex hospitalization  - Chart mentions concern for depression w/ possible suicidal ideation   no further hallucinations  took Seroquel last night- claims poor sleep-  refuses meds/ foods/ therapy  wife has refused using antidepressant  will resume Provigil in AM only    #/Bladder:  - Check urinary status on arrival, possibly anuric   spont voiding    #Dysphagia:    - Diet: tube feeds/ minced moist with mild thicks  SP MBS  - Ongoing SLP assessment- speech to advance diet  - Nutrition to follow    #Skin/ Pressure Injury Prevention:  - Assessment on admission   - Incisions:    #Precautions/ Restrictions  - Falls, Aspiration Precautions   - COVID PCR:

## 2024-06-29 NOTE — PROGRESS NOTE ADULT - SUBJECTIVE AND OBJECTIVE BOX
Cc:  acute respiratory failure, intubation, sepsis, CRRT, DVT, GIB, trach and PEG placement, He is admitted for acute multidisciplinary rehab    HPI: Patient with no new medical issues today.  Pain controlled, no chest pain, no N/V, no Fevers/Chills. No other new ROS  Has been tolerating rehabilitation program.    acetaminophen   Oral Liquid .. 650 milliGRAM(s) Oral every 6 hours PRN  albuterol    0.083% 2.5 milliGRAM(s) Nebulizer every 6 hours  amLODIPine   Tablet 10 milliGRAM(s) Oral daily  apixaban 5 milliGRAM(s) Enteral Tube two times a day  aspirin  chewable 81 milliGRAM(s) Oral daily  atorvastatin 20 milliGRAM(s) Oral at bedtime  bisacodyl Suppository 10 milliGRAM(s) Rectal daily PRN  carvedilol 25 milliGRAM(s) Oral every 12 hours  chlorhexidine 2% Cloths 1 Application(s) Topical <User Schedule>  dextrose 10% Bolus 125 milliLiter(s) IV Bolus once  dextrose 5%. 1000 milliLiter(s) IV Continuous <Continuous>  dextrose 5%. 1000 milliLiter(s) IV Continuous <Continuous>  dextrose 50% Injectable 25 Gram(s) IV Push once  dextrose 50% Injectable 12.5 Gram(s) IV Push once  dextrose Oral Gel 15 Gram(s) Oral once PRN  dextrose Oral Gel 15 Gram(s) Oral once PRN  epoetin reilly-epbx (RETACRIT) Injectable 12981 Unit(s) IV Push <User Schedule>  ferrous    sulfate Liquid 300 milliGRAM(s) Enteral Tube daily  glucagon  Injectable 1 milliGRAM(s) IntraMuscular once  hydrALAZINE 100 milliGRAM(s) Oral three times a day  insulin lispro (ADMELOG) corrective regimen sliding scale   SubCutaneous three times a day before meals  lidocaine   4% Patch 1 Patch Transdermal every 24 hours  lidocaine   4% Patch 1 Patch Transdermal every 24 hours  melatonin 3 milliGRAM(s) Oral at bedtime PRN  methyl salicylate 15%/menthol 10% Topical Cream 1 Application(s) Topical <User Schedule>  modafinil 50 milliGRAM(s) Oral <User Schedule>  Nephro-noam 1 Tablet(s) Oral daily  pantoprazole   Suspension 40 milliGRAM(s) Enteral Tube two times a day  piperacillin/tazobactam IVPB.. 3.375 Gram(s) IV Intermittent every 12 hours  polyethylene glycol 3350 17 Gram(s) Oral daily  QUEtiapine 12.5 milliGRAM(s) Oral at bedtime  senna 2 Tablet(s) Oral at bedtime  sodium chloride 0.9% Bolus 500 milliLiter(s) IV Bolus once      T(C): 37.9 (06-29-24 @ 20:16), Max: 37.9 (06-29-24 @ 20:16)  HR: 69 (06-29-24 @ 20:16) (60 - 96)  BP: 102/56 (06-29-24 @ 20:16) (102/56 - 148/64)  RR: 16 (06-29-24 @ 20:16) (16 - 16)  SpO2: 95% (06-29-24 @ 20:16) (89% - 99%)    In NAD  HEENT- EOMI  Heart- Well Perfused  Lungs- trach  Neuro- Exam unchanged  Psych- Affect wnl                            9.2    10.60 )-----------( 303      ( 28 Jun 2024 14:00 )             29.3     06-28    135  |  96  |  41<H>  ----------------------------<  259<H>  3.6   |  30  |  5.45<H>    Ca    8.7      28 Jun 2024 14:00  Phos  3.2     06-28    TPro  x   /  Alb  2.7<L>  /  TBili  x   /  DBili  x   /  AST  x   /  ALT  x   /  AlkPhos  x   06-28      Urinalysis Basic - ( 28 Jun 2024 14:00 )    Color: x / Appearance: x / SG: x / pH: x  Gluc: 259 mg/dL / Ketone: x  / Bili: x / Urobili: x   Blood: x / Protein: x / Nitrite: x   Leuk Esterase: x / RBC: x / WBC x   Sq Epi: x / Non Sq Epi: x / Bacteria: x        Imp: Patient with diagnosis of      acute respiratory failure, intubation, sepsis, CRRT, DVT, GIB, trach and PEG placement, He is admitted for acute multidisciplinary rehab     admitted for comprehensive acute rehabilitation.    Plan:  - Continue therapies  - DVT prophylaxis  - Skin- Turn q2h, check skin daily  - Continue current medications; patient medically stable.   - Patient is stable to continue current rehabilitation program.   - Pulm notes from today: "Assessment  1. Chronic Respiratory failure s/p Trach  2. AMS appears to have improved , ? Baclofen toxicity vs CVA  3. ESRD on HD, DM 2,   4. Right iliac DVT on Eliquis      Plan  Check CXR  hold capping today  can restart jaret if no secretions and use n/c o2    avoid frequent tracheal suctioning ; Oral suction ok,   trach care  o2 Viat TC to keep humidity and sat > 92%  PMV with therapies and day time hours appears to be tolerating     continue Eliquis for DVT  d/w RN   will follow"

## 2024-06-30 LAB
GLUCOSE BLDC GLUCOMTR-MCNC: 134 MG/DL — HIGH (ref 70–99)
GLUCOSE BLDC GLUCOMTR-MCNC: 185 MG/DL — HIGH (ref 70–99)
GLUCOSE BLDC GLUCOMTR-MCNC: 194 MG/DL — HIGH (ref 70–99)
GLUCOSE BLDC GLUCOMTR-MCNC: 230 MG/DL — HIGH (ref 70–99)
GRAM STN FLD: ABNORMAL
HCT VFR BLD CALC: 27.6 % — LOW (ref 39–50)
HGB BLD-MCNC: 8.6 G/DL — LOW (ref 13–17)
LACTATE SERPL-SCNC: 1.4 MMOL/L — SIGNIFICANT CHANGE UP (ref 0.7–2)
MCHC RBC-ENTMCNC: 22.6 PG — LOW (ref 27–34)
MCHC RBC-ENTMCNC: 31.2 GM/DL — LOW (ref 32–36)
MCV RBC AUTO: 72.4 FL — LOW (ref 80–100)
NRBC # BLD: 0 /100 WBCS — SIGNIFICANT CHANGE UP (ref 0–0)
PLATELET # BLD AUTO: 245 K/UL — SIGNIFICANT CHANGE UP (ref 150–400)
PROCALCITONIN SERPL-MCNC: 12.2 NG/ML — HIGH
RAPID RVP RESULT: SIGNIFICANT CHANGE UP
RBC # BLD: 3.81 M/UL — LOW (ref 4.2–5.8)
RBC # FLD: 20.7 % — HIGH (ref 10.3–14.5)
SARS-COV-2 RNA SPEC QL NAA+PROBE: SIGNIFICANT CHANGE UP
SPECIMEN SOURCE: SIGNIFICANT CHANGE UP
WBC # BLD: 13.95 K/UL — HIGH (ref 3.8–10.5)
WBC # FLD AUTO: 13.95 K/UL — HIGH (ref 3.8–10.5)

## 2024-06-30 PROCEDURE — 99232 SBSQ HOSP IP/OBS MODERATE 35: CPT

## 2024-06-30 PROCEDURE — 99233 SBSQ HOSP IP/OBS HIGH 50: CPT

## 2024-06-30 RX ADMIN — Medication 1 APPLICATION(S): at 07:15

## 2024-06-30 RX ADMIN — PANTOPRAZOLE SODIUM 40 MILLIGRAM(S): 40 INJECTION, POWDER, FOR SOLUTION INTRAVENOUS at 08:00

## 2024-06-30 RX ADMIN — APIXABAN 5 MILLIGRAM(S): 5 TABLET, FILM COATED ORAL at 18:21

## 2024-06-30 RX ADMIN — Medication 2.5 MILLIGRAM(S): at 05:25

## 2024-06-30 RX ADMIN — LIDOCAINE HCL 1 PATCH: 28 GEL TOPICAL at 18:22

## 2024-06-30 RX ADMIN — HYDRALAZINE HYDROCHLORIDE 100 MILLIGRAM(S): 50 TABLET ORAL at 14:37

## 2024-06-30 RX ADMIN — ATORVASTATIN CALCIUM 20 MILLIGRAM(S): 20 TABLET, FILM COATED ORAL at 22:09

## 2024-06-30 RX ADMIN — PIPERACILLIN SODIUM AND TAZOBACTAM SODIUM 25 GRAM(S): 3; .375 INJECTION, POWDER, LYOPHILIZED, FOR SOLUTION INTRAVENOUS at 00:28

## 2024-06-30 RX ADMIN — ASPIRIN 81 MILLIGRAM(S): 325 TABLET, FILM COATED ORAL at 11:43

## 2024-06-30 RX ADMIN — AMLODIPINE BESYLATE 10 MILLIGRAM(S): 2.5 TABLET ORAL at 09:22

## 2024-06-30 RX ADMIN — MENTHOL, METHYL SALICYLATE 1 APPLICATION(S): 63; 210 PATCH PERCUTANEOUS; TOPICAL; TRANSDERMAL at 07:14

## 2024-06-30 RX ADMIN — Medication 300 MILLIGRAM(S): at 11:44

## 2024-06-30 RX ADMIN — POLYETHYLENE GLYCOL 3350 17 GRAM(S): 1 POWDER ORAL at 11:44

## 2024-06-30 RX ADMIN — CARVEDILOL PHOSPHATE 25 MILLIGRAM(S): 80 CAPSULE, EXTENDED RELEASE ORAL at 18:22

## 2024-06-30 RX ADMIN — PIPERACILLIN SODIUM AND TAZOBACTAM SODIUM 25 GRAM(S): 3; .375 INJECTION, POWDER, LYOPHILIZED, FOR SOLUTION INTRAVENOUS at 06:59

## 2024-06-30 RX ADMIN — INSULIN LISPRO 1: 100 INJECTION, SOLUTION SUBCUTANEOUS at 11:56

## 2024-06-30 RX ADMIN — HYDRALAZINE HYDROCHLORIDE 100 MILLIGRAM(S): 50 TABLET ORAL at 22:09

## 2024-06-30 RX ADMIN — APIXABAN 5 MILLIGRAM(S): 5 TABLET, FILM COATED ORAL at 06:58

## 2024-06-30 RX ADMIN — LIDOCAINE HCL 1 PATCH: 28 GEL TOPICAL at 19:10

## 2024-06-30 RX ADMIN — Medication 2.5 MILLIGRAM(S): at 08:14

## 2024-06-30 RX ADMIN — INSULIN LISPRO 1: 100 INJECTION, SOLUTION SUBCUTANEOUS at 08:20

## 2024-06-30 RX ADMIN — Medication 12.5 MILLIGRAM(S): at 22:09

## 2024-06-30 RX ADMIN — Medication 2.5 MILLIGRAM(S): at 20:48

## 2024-06-30 RX ADMIN — MODAFINIL 50 MILLIGRAM(S): 100 TABLET ORAL at 07:25

## 2024-06-30 RX ADMIN — PANTOPRAZOLE SODIUM 40 MILLIGRAM(S): 40 INJECTION, POWDER, FOR SOLUTION INTRAVENOUS at 18:22

## 2024-06-30 RX ADMIN — LIDOCAINE HCL 1 PATCH: 28 GEL TOPICAL at 19:44

## 2024-06-30 RX ADMIN — Medication 1 TABLET(S): at 11:44

## 2024-06-30 RX ADMIN — Medication 2 TABLET(S): at 22:09

## 2024-06-30 RX ADMIN — PIPERACILLIN SODIUM AND TAZOBACTAM SODIUM 25 GRAM(S): 3; .375 INJECTION, POWDER, LYOPHILIZED, FOR SOLUTION INTRAVENOUS at 18:24

## 2024-06-30 RX ADMIN — HYDRALAZINE HYDROCHLORIDE 100 MILLIGRAM(S): 50 TABLET ORAL at 09:22

## 2024-06-30 RX ADMIN — INSULIN GLARGINE 5 UNIT(S): 100 INJECTION, SOLUTION SUBCUTANEOUS at 09:19

## 2024-06-30 RX ADMIN — MENTHOL, METHYL SALICYLATE 1 APPLICATION(S): 63; 210 PATCH PERCUTANEOUS; TOPICAL; TRANSDERMAL at 14:36

## 2024-06-30 RX ADMIN — Medication 2.5 MILLIGRAM(S): at 15:05

## 2024-06-30 NOTE — PROGRESS NOTE ADULT - SUBJECTIVE AND OBJECTIVE BOX
Cc:  acute respiratory failure, intubation, sepsis, CRRT, DVT, GIB, trach and PEG placement, He is admitted for acute multidisciplinary rehab    HPI: had fever overnight, with increased sputum production and suctioning needs    acetaminophen   Oral Liquid .. 650 milliGRAM(s) Oral every 6 hours PRN  albuterol    0.083% 2.5 milliGRAM(s) Nebulizer every 6 hours  amLODIPine   Tablet 10 milliGRAM(s) Oral daily  apixaban 5 milliGRAM(s) Enteral Tube two times a day  aspirin  chewable 81 milliGRAM(s) Oral daily  atorvastatin 20 milliGRAM(s) Oral at bedtime  bisacodyl Suppository 10 milliGRAM(s) Rectal daily PRN  carvedilol 25 milliGRAM(s) Oral every 12 hours  chlorhexidine 2% Cloths 1 Application(s) Topical <User Schedule>  dextrose 10% Bolus 125 milliLiter(s) IV Bolus once  dextrose 5%. 1000 milliLiter(s) IV Continuous <Continuous>  dextrose 5%. 1000 milliLiter(s) IV Continuous <Continuous>  dextrose 50% Injectable 25 Gram(s) IV Push once  dextrose 50% Injectable 12.5 Gram(s) IV Push once  dextrose Oral Gel 15 Gram(s) Oral once PRN  dextrose Oral Gel 15 Gram(s) Oral once PRN  epoetin reilly-epbx (RETACRIT) Injectable 47129 Unit(s) IV Push <User Schedule>  ferrous    sulfate Liquid 300 milliGRAM(s) Enteral Tube daily  glucagon  Injectable 1 milliGRAM(s) IntraMuscular once  hydrALAZINE 100 milliGRAM(s) Oral three times a day  insulin lispro (ADMELOG) corrective regimen sliding scale   SubCutaneous three times a day before meals  lidocaine   4% Patch 1 Patch Transdermal every 24 hours  lidocaine   4% Patch 1 Patch Transdermal every 24 hours  melatonin 3 milliGRAM(s) Oral at bedtime PRN  methyl salicylate 15%/menthol 10% Topical Cream 1 Application(s) Topical <User Schedule>  modafinil 50 milliGRAM(s) Oral <User Schedule>  Nephro-noam 1 Tablet(s) Oral daily  pantoprazole   Suspension 40 milliGRAM(s) Enteral Tube two times a day  piperacillin/tazobactam IVPB.. 3.375 Gram(s) IV Intermittent every 12 hours  polyethylene glycol 3350 17 Gram(s) Oral daily  QUEtiapine 12.5 milliGRAM(s) Oral at bedtime  senna 2 Tablet(s) Oral at bedtime  sodium chloride 0.9% Bolus 500 milliLiter(s) IV Bolus once    ICU Vital Signs Last 24 Hrs  T(C): 36.4 (30 Jun 2024 00:45), Max: 37.9 (29 Jun 2024 20:16)  T(F): 97.6 (30 Jun 2024 00:45), Max: 100.3 (29 Jun 2024 20:16)  HR: 55 (30 Jun 2024 08:16) (55 - 96)  BP: 126/65 (30 Jun 2024 06:44) (102/56 - 133/62)  RR: 16 (29 Jun 2024 20:16) (16 - 16)  SpO2: 99% (30 Jun 2024 08:16) (95% - 99%)    O2 Parameters below as of 30 Jun 2024 08:16  Patient On (Oxygen Delivery Method): tracheostomy collar                                8.6    13.95 )-----------( 245      ( 30 Jun 2024 00:00 )             27.6     06-28    135  |  96  |  41<H>  ----------------------------<  259<H>  3.6   |  30  |  5.45<H>    Ca    8.7      28 Jun 2024 14:00  Phos  3.2     06-28    TPro  x   /  Alb  2.7<L>  /  TBili  x   /  DBili  x   /  AST  x   /  ALT  x   /  AlkPhos  x   06-28      Urinalysis Basic - ( 28 Jun 2024 14:00 )    Color: x / Appearance: x / SG: x / pH: x  Gluc: 259 mg/dL / Ketone: x  / Bili: x / Urobili: x   Blood: x / Protein: x / Nitrite: x   Leuk Esterase: x / RBC: x / WBC x   Sq Epi: x / Non Sq Epi: x / Bacteria: x      In NAD  HEENT- EOMI  Heart- Well Perfused  Lungs- trach  Neuro- Exam unchanged  Psych- Affect wnl            Imp: Patient with diagnosis of      acute respiratory failure, intubation, sepsis, CRRT, DVT, GIB, trach and PEG placement, He is admitted for acute multidisciplinary rehab     admitted for comprehensive acute rehabilitation.    Plan:  - Continue therapies  - DVT prophylaxis  - Skin- Turn q2h, check skin daily  - Continue current medications; patient medically stable.   - Patient is stable to continue current rehabilitation program.     Medicine chart note reviewed:  "RVP NEGATIVE    PLAN-  73M HTN, ESRD (HD MWF via AVG) DM who was admitted for unstable angina further c/b altered mental status requiring intubation with CRRT requriment, acute CVA vs baclofen toxicity, DVT and GIB s/p PEG/trach. Notable course for AV Fistula dysfunction requiring balloon angioplasty. Now in AR.     fever- ?asp PNA  cxr- no gross infiltrates noted, official report pending  leukocytosis noted  lactate negative  procal elevated  RVP negative  bc x 2 ordered  UA ordered- pt still able to make small amount of urine per RN  will start on epimeric Zosyn"

## 2024-06-30 NOTE — PROGRESS NOTE ADULT - SUBJECTIVE AND OBJECTIVE BOX
[facility] NEPHROLOGY SERVICES, [facility]  NEPHROLOGY AND HYPERTENSION  [address]  [address]  [place] [address]  [phone]    MD RONNA CHANG MD YELENA ROSENBERG, MD BINNY KOSHY, MD CHRISTOPHER CAPUTO, MD EDWARD BOVER, MD          Patient events noted    MEDICATIONS  (STANDING):  albuterol    0.083% 2.5 milliGRAM(s) Nebulizer every 6 hours  amLODIPine   Tablet 10 milliGRAM(s) Oral daily  apixaban 5 milliGRAM(s) Enteral Tube two times a day  aspirin  chewable 81 milliGRAM(s) Oral daily  atorvastatin 20 milliGRAM(s) Oral at bedtime  carvedilol 25 milliGRAM(s) Oral every 12 hours  chlorhexidine 2% Cloths 1 Application(s) Topical <User Schedule>  dextrose 10% Bolus 125 milliLiter(s) IV Bolus once  dextrose 5%. 1000 milliLiter(s) (50 mL/Hr) IV Continuous <Continuous>  dextrose 5%. 1000 milliLiter(s) (100 mL/Hr) IV Continuous <Continuous>  dextrose 50% Injectable 25 Gram(s) IV Push once  dextrose 50% Injectable 12.5 Gram(s) IV Push once  epoetin reilly-epbx (RETACRIT) Injectable 92466 Unit(s) IV Push <User Schedule>  ferrous    sulfate Liquid 300 milliGRAM(s) Enteral Tube daily  glucagon  Injectable 1 milliGRAM(s) IntraMuscular once  hydrALAZINE 100 milliGRAM(s) Oral three times a day  insulin glargine Injectable (LANTUS) 5 Unit(s) SubCutaneous every morning  insulin lispro (ADMELOG) corrective regimen sliding scale   SubCutaneous three times a day before meals  lidocaine   4% Patch 1 Patch Transdermal every 24 hours  lidocaine   4% Patch 1 Patch Transdermal every 24 hours  methyl salicylate 15%/menthol 10% Topical Cream 1 Application(s) Topical <User Schedule>  modafinil 50 milliGRAM(s) Oral <User Schedule>  Nephro-noam 1 Tablet(s) Oral daily  pantoprazole   Suspension 40 milliGRAM(s) Enteral Tube two times a day  piperacillin/tazobactam IVPB.. 3.375 Gram(s) IV Intermittent every 12 hours  polyethylene glycol 3350 17 Gram(s) Oral daily  QUEtiapine 12.5 milliGRAM(s) Oral at bedtime  senna 2 Tablet(s) Oral at bedtime    MEDICATIONS  (PRN):  acetaminophen   Oral Liquid .. 650 milliGRAM(s) Oral every 6 hours PRN Moderate Pain (4 - 6)  bisacodyl Suppository 10 milliGRAM(s) Rectal daily PRN Constipation  dextrose Oral Gel 15 Gram(s) Oral once PRN Blood Glucose LESS THAN 70 milliGRAM(s)/deciliter  dextrose Oral Gel 15 Gram(s) Oral once PRN Blood Glucose LESS THAN 70 milliGRAM(s)/deciliter  melatonin 3 milliGRAM(s) Oral at bedtime PRN Insomnia      06-29-24 @ 07:01  -  06-30-24 @ 07:00  --------------------------------------------------------  IN: 960 mL / OUT: 0 mL / NET: 960 mL    06-30-24 @ 07:01  -  06-30-24 @ 22:17  --------------------------------------------------------  IN: 820 mL / OUT: 0 mL / NET: 820 mL      PHYSICAL EXAM:      T(C): 35.9 (06-30-24 @ 22:10), Max: 36.4 (06-30-24 @ 00:45)  HR: 58 (06-30-24 @ 22:10) (53 - 62)  BP: 106/52 (06-30-24 @ 22:10) (102/59 - 133/74)  RR: 15 (06-30-24 @ 22:10) (15 - 16)  SpO2: 96% (06-30-24 @ 22:10) (96% - 99%)  Wt(kg): --  Lungs clear  Heart S1S2  Abd soft NT ND  Extremities:   tr edema                                    8.6    13.95 )-----------( 245      ( 30 Jun 2024 00:00 )             27.6               Creatinine Trend: 5.45<--, 6.23<--, 4.90<--, 5.36<--, 5.97<--, 3.88<--      Impression:  ESRD on HD MWF  S/p complicated hospital course as per HPI  S/p CVA, trach, PEG    Recommend:  Complete HD with minimal UF  Epoetin w/HD 3 x week      Delonte Moya MD

## 2024-06-30 NOTE — PROGRESS NOTE ADULT - ASSESSMENT
Assessment and Plan:   · Assessment	  Physical Exam   GENERAL:               Alert,  No acute distress.    HEENT:                   No JVD, Dry MM + trach with no secretion   PULM:                     Bilateral air entry, diminished to auscultation bilaterally, no significant sputum production, No Rales, No Rhonchi, No Wheezing  CVS:                         S1, S2,  No Murmur  ABD:                        Soft, nondistended, nontender, normoactive bowel sounds, + PEG  EXT:                         No edema, nontender, No Cyanosis or Clubbing    NEURO:                  Alert,  interactive,  follows commands  PSYC:                      Calm,      Assessment  1. Chronic Respiratory failure s/p Trach  2. AMS appears to have improved , ? Baclofen toxicity vs CVA  3. ESRD on HD, DM 2,   4. Right iliac DVT on Eliquis      Plan   started on abx  hold capping today  can restart jaret if no secretions   use n/c o2 when/if capped    avoid frequent tracheal suctioning ; Oral suction ok,   trach care  o2 Viat TC to keep humidity and sat > 92%  PMV with therapies and day time hours appears to be tolerating     continue Eliquis for DVT  d/w RN   will follow

## 2024-06-30 NOTE — PROGRESS NOTE ADULT - SUBJECTIVE AND OBJECTIVE BOX
Follow-up Pulmonary Progress Note  Chief Complaint : Cerebral infarction    patient seen and examined  comfortable  had increased secretions as per rn  overnight started on antibiotics.         Allergies :No Known Allergies      PAST MEDICAL & SURGICAL HISTORY:  Diabetes    Benign essential HTN    HLD (hyperlipidemia)    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    ESRD on hemodialysis    Arteriovenous fistula        Medications:  MEDICATIONS  (STANDING):  albuterol    0.083% 2.5 milliGRAM(s) Nebulizer every 6 hours  amLODIPine   Tablet 10 milliGRAM(s) Oral daily  apixaban 5 milliGRAM(s) Enteral Tube two times a day  aspirin  chewable 81 milliGRAM(s) Oral daily  atorvastatin 20 milliGRAM(s) Oral at bedtime  carvedilol 25 milliGRAM(s) Oral every 12 hours  chlorhexidine 2% Cloths 1 Application(s) Topical <User Schedule>  dextrose 10% Bolus 125 milliLiter(s) IV Bolus once  dextrose 5%. 1000 milliLiter(s) (50 mL/Hr) IV Continuous <Continuous>  dextrose 5%. 1000 milliLiter(s) (100 mL/Hr) IV Continuous <Continuous>  dextrose 50% Injectable 12.5 Gram(s) IV Push once  dextrose 50% Injectable 25 Gram(s) IV Push once  epoetin reilly-epbx (RETACRIT) Injectable 26786 Unit(s) IV Push <User Schedule>  ferrous    sulfate Liquid 300 milliGRAM(s) Enteral Tube daily  glucagon  Injectable 1 milliGRAM(s) IntraMuscular once  hydrALAZINE 100 milliGRAM(s) Oral three times a day  insulin glargine Injectable (LANTUS) 5 Unit(s) SubCutaneous every morning  insulin lispro (ADMELOG) corrective regimen sliding scale   SubCutaneous three times a day before meals  lidocaine   4% Patch 1 Patch Transdermal every 24 hours  lidocaine   4% Patch 1 Patch Transdermal every 24 hours  methyl salicylate 15%/menthol 10% Topical Cream 1 Application(s) Topical <User Schedule>  modafinil 50 milliGRAM(s) Oral <User Schedule>  Nephro-noam 1 Tablet(s) Oral daily  pantoprazole   Suspension 40 milliGRAM(s) Enteral Tube two times a day  piperacillin/tazobactam IVPB.. 3.375 Gram(s) IV Intermittent every 12 hours  polyethylene glycol 3350 17 Gram(s) Oral daily  QUEtiapine 12.5 milliGRAM(s) Oral at bedtime  senna 2 Tablet(s) Oral at bedtime    MEDICATIONS  (PRN):  acetaminophen   Oral Liquid .. 650 milliGRAM(s) Oral every 6 hours PRN Moderate Pain (4 - 6)  bisacodyl Suppository 10 milliGRAM(s) Rectal daily PRN Constipation  dextrose Oral Gel 15 Gram(s) Oral once PRN Blood Glucose LESS THAN 70 milliGRAM(s)/deciliter  dextrose Oral Gel 15 Gram(s) Oral once PRN Blood Glucose LESS THAN 70 milliGRAM(s)/deciliter  melatonin 3 milliGRAM(s) Oral at bedtime PRN Insomnia      Antibiotics History  piperacillin/tazobactam IVPB.. 3.375 Gram(s) IV Intermittent every 12 hours, 06-29-24 @ 22:03, Stop order after: 7 Days      Heme Medications   apixaban 5 milliGRAM(s) Enteral Tube two times a day, 06-14-24 @ 18:29  aspirin  chewable 81 milliGRAM(s) Oral daily, 06-15-24 @ 00:00      GI Medications  bisacodyl Suppository 10 milliGRAM(s) Rectal daily, 06-14-24 @ 18:30, Routine PRN  pantoprazole   Suspension 40 milliGRAM(s) Enteral Tube two times a day, 06-14-24 @ 21:14, Routine  polyethylene glycol 3350 17 Gram(s) Oral daily, 06-15-24 @ 00:00, Routine  senna 2 Tablet(s) Oral at bedtime, 06-14-24 @ 18:29, Routine        LABS:                        8.6    13.95 )-----------( 245      ( 30 Jun 2024 00:00 )             27.6     06-28    135  |  96  |  41<H>  ----------------------------<  259<H>  3.6   |  30  |  5.45<H>    Ca    8.7      28 Jun 2024 14:00  Phos  3.2     06-28    TPro  x   /  Alb  2.7<L>  /  TBili  x   /  DBili  x   /  AST  x   /  ALT  x   /  AlkPhos  x   06-28              Urinalysis Basic - ( 28 Jun 2024 14:00 )    Color: x / Appearance: x / SG: x / pH: x  Gluc: 259 mg/dL / Ketone: x  / Bili: x / Urobili: x   Blood: x / Protein: x / Nitrite: x   Leuk Esterase: x / RBC: x / WBC x   Sq Epi: x / Non Sq Epi: x / Bacteria: x      Procalcitonin: 12.20 ng/mL (06-30-24 @ 00:00)          CULTURES: (if applicable)    Rapid RVP Result: NotDetec (06-29-24 @ 22:30)    < from: Xray Chest 1 View-PORTABLE IMMEDIATE (Xray Chest 1 View-PORTABLE IMMEDIATE .) (06.29.24 @ 22:14) >    ACC: [id number] EXAM:  XR CHEST PORTABLE IMMED 1V   ORDERED BY: LITTLE DAVIS     PROCEDURE DATE:  06/29/2024          INTERPRETATION:  INDICATIONS: 73 year-old with fever.    Prior examination for comparison: 6/18/2024    Technique: AP view of chest    Findings:    Tracheostomy tube in place. Persistent ill defined opacity left lower   lung zone. There are no pleural effusions. The cardiomediastinal   silhouette is normal. Bones are grossly normal.    IMPRESSION:    Persistent ill-defined leftlower lung zone opacity.    --- End of Report ---      < end of copied text >           VITALS:  T(C): 36.4 (06-30-24 @ 09:16), Max: 37.9 (06-29-24 @ 20:16)  T(F): 97.5 (06-30-24 @ 09:16), Max: 100.3 (06-29-24 @ 20:16)  HR: 53 (06-30-24 @ 09:16) (53 - 96)  BP: 133/74 (06-30-24 @ 09:16) (102/56 - 133/74)  BP(mean): --  ABP: --  ABP(mean): --  RR: 16 (06-30-24 @ 09:16) (16 - 16)  SpO2: 96% (06-30-24 @ 09:16) (95% - 99%)  CVP(mm Hg): --  CVP(cm H2O): --    Ins and Outs     06-29-24 @ 07:01  -  06-30-24 @ 07:00  --------------------------------------------------------  IN: 960 mL / OUT: 0 mL / NET: 960 mL                I&O's Detail    29 Jun 2024 07:01  -  30 Jun 2024 07:00  --------------------------------------------------------  IN:    Free Water: 480 mL    Nepro: 480 mL  Total IN: 960 mL    OUT:  Total OUT: 0 mL    Total NET: 960 mL

## 2024-06-30 NOTE — PROGRESS NOTE ADULT - ASSESSMENT
73M HTN, ESRD (HD MWF via AVG) DM who was admitted for unstable angina further c/b altered mental status requiring intubation with CRRT requriment, acute CVA vs baclofen toxicity, DVT and GIB s/p PEG/trach. Notable course for AV Fistula dysfunction requiring balloon angioplasty. Now in AR.     Low grade fever  Possible PNA  - T max 100.3 over night and pt with thick mucus   - CXR reviewed by me. no clear infiltrates. follow up official report  - cont with empiric abx IV zosyn  - follow up with blood cx and sputum cx. pt is oliguric and makes minimal urine, follow up urine cx if able to collect sample    Recent AMS suspect due to baclofen toxicity vs CVA  Acute CVA  - MRI head 5/6 showed acute infarct in left talya and left cerebellum   - continue holding baclofen  - continue ASA and statin     DM2   - hypoglycemia on 6/23 resolved   - A1c 6.9  - now with intermittent hyperglycemia. will restart low dose Lantus 5unit qAM  - continue ISS    #Acute Hypoxemic, Hypercapnic Respiratory Failure, s/p ETT intubation  #increased secretions, sialorrhea  - scopolamine patch  - s/p tracheostomy   - Albuterol neb q6h   - pulm on board  - oral suction, minimize trach suctioning     ESRD on HD MWF via RT AFF  - stable, continue HD    Hallucination  - Seroquel 12.5mg HS  - Psych following    HTN  - Coreg 25 q12h  - Hydralazine 100 mg TID  - Norvasc 10 mg/D  - BP acceptable     R Common Iliac DVT  - CT AP (5/12) with nonocclusive RIGHT common iliac vein deep venous thrombosis  - Continue Eliquis 5 mg BID     #Oropharyngeal Dysphagia  - s/p PEG (5/17)  - Continue PO diet and supplement with TF if po intake is poor    DVT PPX: Eliquis

## 2024-06-30 NOTE — PROGRESS NOTE ADULT - ASSESSMENT
From primary team notes:  73-year-old male w/ past medical history hypertension, ESRD (HD MWF)  insulin-dependent DM presented 4/29/24 to [facility] 4/29 with hiccups and demand ischemia, was treated with baclofen, developed what appears to be toxic encephalopathy. He then sustained a prolonged, complex hospital course over approx 6 weeks, notable for acute respiratory failure, intubation, sepsis, CRRT, DVT, GIB, trach and PEG placement, He is admitted for acute multidisciplinary rehab on 6/14/24 to [facility].     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 Pulmonary to DC  - Respiratory to follow in house    #ESRD on HD  #Fistula stenosis   #Anemia   - Continue HD M/W/F per renal   - Access is RUE AVF  - Regular monitoring of electrolytes  - EPOGEN w/ HD  - Iron supplementation, 300 mg daily   HD today    #HTN  - Coreg 25 q12h  - Hydralazine 100 mg TID  - Norvasc 10 mg/D  - Isordil would be next agent added  BPs Stable    #NSTEMI, Demand Ischemia, resolved  - Initially, troponins mildly elevated  - TTE (4/30): EF 72, normal, no regional wall motion abnormalities   - No need to pursue cath at this time   - 20 mg lipitor at bedtime     #IDDM2, A1c 6.9  - NPH 4u q6h  - FS, ISS TIDAC  Glucoses OK with coverage    #R Common Iliac DVT  - Initially treated with heparin but then developed questionable GIB, transitioned to Eliquis   - IR was consulted, no plan for IVC filter  - Continue Eliquis 5 mg BID     #GI  - Concern melena 5/27, resolved, transfused   - GI consulted, not candidate for endoscopy due to surgical risk  - Concern for upper GI Bleed, GI felt not true bleed  - Continue PPI BID   - MIralax 17g daily  - Senna 2 @ bedtime   - Ducolax suppository daily PRN   last BM 6/27    #Oropharyngeal Dysphagia  #s/p PEG (5/17)  - Failed green dye 6/1   - Continue feeds: Card Steady 10 cc/hr incr. q6 goal 45cc/hr - feeds changed to Bolus- tolerating no signs of aspiration  [facility] [place] cleared for minced moist with mild thicks  supplement PO with G feeds if needed  speech to advance diet      #Mood / Cognition:  - Neuropsych evaluation given prolonged and complex hospitalization  - Chart mentions concern for depression w/ possible suicidal ideation   no further hallucinations  took Seroquel last night- claims poor sleep-  refuses meds/ foods/ therapy  wife has refused using antidepressant  will resume Provigil in AM only    #/Bladder:  - Check urinary status on arrival, possibly anuric   spont voiding    #Dysphagia:    - Diet: tube feeds/ minced moist with mild thicks  SP MBS  - Ongoing SLP assessment- speech to advance diet  - Nutrition to follow    #Skin/ Pressure Injury Prevention:  - Assessment on admission   - Incisions:    #Precautions/ Restrictions  - Falls, Aspiration Precautions   - COVID PCR:

## 2024-06-30 NOTE — CHART NOTE - NSCHARTNOTEFT_GEN_A_CORE
called by RN for temp of 100.3 , sats 95% trach    stat labs ordered-   Lactate, Blood: 1.4 mmol/L (06.30.24 @ 00:00)  Procalcitonin: 12.20 (06.30.24 @ 00:00)    Complete Blood Count in AM (06.30.24 @ 00:00)    Nucleated RBC: 0 /100 WBCs    WBC Count: 13.95 K/uL    RBC Count: 3.81 M/uL    Hemoglobin: 8.6 g/dL    Hematocrit: 27.6 %    Mean Cell Volume: 72.4 fl    Mean Cell Hemoglobin: 22.6 pg    Mean Cell Hemoglobin Conc: 31.2 gm/dL    Red Cell Distrib Width: 20.7 %    Platelet Count - Automated: 245 K/uL    RVP NEGATIVE    PLAN-  73M HTN, ESRD (HD MWF via AVG) DM who was admitted for unstable angina further c/b altered mental status requiring intubation with CRRT requriment, acute CVA vs baclofen toxicity, DVT and GIB s/p PEG/trach. Notable course for AV Fistula dysfunction requiring balloon angioplasty. Now in AR.     fever- ?asp PNA  cxr- no gross infiltrates noted, official report pending  leukocytosis noted  lactate negative  procal elevated  RVP negative  bc x 2 ordered  UA ordered- pt still able to make small amount of urine per RN  will start on epimeric Zosyn.

## 2024-06-30 NOTE — PROGRESS NOTE ADULT - SUBJECTIVE AND OBJECTIVE BOX
Patient is a 73y old  Male who presents with a chief complaint of acute respiratory failure, intubation, sepsis, CRRT, DVT, GIB, trach and PEG placement, He is admitted for acute multidisciplinary rehab (29 Jun 2024 22:19)      Subjective and overnight events:  Patient seen and examined at bedside. rect temp 100.3 overnight. + thick mucous from trach.     ALLERGIES:  No Known Allergies    MEDICATIONS  (STANDING):  albuterol    0.083% 2.5 milliGRAM(s) Nebulizer every 6 hours  amLODIPine   Tablet 10 milliGRAM(s) Oral daily  apixaban 5 milliGRAM(s) Enteral Tube two times a day  aspirin  chewable 81 milliGRAM(s) Oral daily  atorvastatin 20 milliGRAM(s) Oral at bedtime  carvedilol 25 milliGRAM(s) Oral every 12 hours  chlorhexidine 2% Cloths 1 Application(s) Topical <User Schedule>  dextrose 10% Bolus 125 milliLiter(s) IV Bolus once  dextrose 5%. 1000 milliLiter(s) (100 mL/Hr) IV Continuous <Continuous>  dextrose 5%. 1000 milliLiter(s) (50 mL/Hr) IV Continuous <Continuous>  dextrose 50% Injectable 25 Gram(s) IV Push once  dextrose 50% Injectable 12.5 Gram(s) IV Push once  epoetin reilly-epbx (RETACRIT) Injectable 52242 Unit(s) IV Push <User Schedule>  ferrous    sulfate Liquid 300 milliGRAM(s) Enteral Tube daily  glucagon  Injectable 1 milliGRAM(s) IntraMuscular once  hydrALAZINE 100 milliGRAM(s) Oral three times a day  insulin glargine Injectable (LANTUS) 5 Unit(s) SubCutaneous every morning  insulin lispro (ADMELOG) corrective regimen sliding scale   SubCutaneous three times a day before meals  lidocaine   4% Patch 1 Patch Transdermal every 24 hours  lidocaine   4% Patch 1 Patch Transdermal every 24 hours  methyl salicylate 15%/menthol 10% Topical Cream 1 Application(s) Topical <User Schedule>  modafinil 50 milliGRAM(s) Oral <User Schedule>  Nephro-noam 1 Tablet(s) Oral daily  pantoprazole   Suspension 40 milliGRAM(s) Enteral Tube two times a day  piperacillin/tazobactam IVPB.. 3.375 Gram(s) IV Intermittent every 12 hours  polyethylene glycol 3350 17 Gram(s) Oral daily  QUEtiapine 12.5 milliGRAM(s) Oral at bedtime  senna 2 Tablet(s) Oral at bedtime    MEDICATIONS  (PRN):  acetaminophen   Oral Liquid .. 650 milliGRAM(s) Oral every 6 hours PRN Moderate Pain (4 - 6)  bisacodyl Suppository 10 milliGRAM(s) Rectal daily PRN Constipation  dextrose Oral Gel 15 Gram(s) Oral once PRN Blood Glucose LESS THAN 70 milliGRAM(s)/deciliter  dextrose Oral Gel 15 Gram(s) Oral once PRN Blood Glucose LESS THAN 70 milliGRAM(s)/deciliter  melatonin 3 milliGRAM(s) Oral at bedtime PRN Insomnia    Vital Signs Last 24 Hrs  T(F): 97.5 (30 Jun 2024 09:16), Max: 100.3 (29 Jun 2024 20:16)  HR: 53 (30 Jun 2024 09:16) (53 - 96)  BP: 133/74 (30 Jun 2024 09:16) (102/56 - 133/74)  RR: 16 (30 Jun 2024 09:16) (16 - 16)  SpO2: 96% (30 Jun 2024 09:16) (95% - 99%)  I&O's Summary    29 Jun 2024 07:01  -  30 Jun 2024 07:00  --------------------------------------------------------  IN: 960 mL / OUT: 0 mL / NET: 960 mL      PHYSICAL EXAM:  General: NAD, Awake alert.   ENT: MMM  Neck: Supple, No JVD  Lungs: Clear to auscultation bilaterally  Cardio: RRR, S1/S2, No murmurs  Abdomen: Soft, Nontender, Nondistended; Bowel sounds present  Extremities: No calf tenderness, No pitting edema    LABS:                        8.6    13.95 )-----------( 245      ( 30 Jun 2024 00:00 )             27.6     06-28    135  |  96  |  41  ----------------------------<  259  3.6   |  30  |  5.45    Ca    8.7      28 Jun 2024 14:00  Phos  3.2     06-28    TPro  x   /  Alb  2.7  /  TBili  x   /  DBili  x   /  AST  x   /  ALT  x   /  AlkPhos  x   06-28        Lactate, Blood: 1.4 mmol/L (06-30 @ 00:00)        05-17 Chol 86 mg/dL LDL -- HDL 27 mg/dL Trig 151 mg/dL              POCT Blood Glucose.: 185 mg/dL (30 Jun 2024 08:08)  POCT Blood Glucose.: 170 mg/dL (29 Jun 2024 21:12)  POCT Blood Glucose.: 318 mg/dL (29 Jun 2024 17:02)      Urinalysis Basic - ( 28 Jun 2024 14:00 )    Color: x / Appearance: x / SG: x / pH: x  Gluc: 259 mg/dL / Ketone: x  / Bili: x / Urobili: x   Blood: x / Protein: x / Nitrite: x   Leuk Esterase: x / RBC: x / WBC x   Sq Epi: x / Non Sq Epi: x / Bacteria: x        COVID-19 PCR: NotDetec (06-14-24 @ 18:56)      RADIOLOGY & ADDITIONAL TESTS:    Care Discussed with Consultants/Other Providers:

## 2024-07-01 LAB
ANION GAP SERPL CALC-SCNC: 12 MMOL/L — SIGNIFICANT CHANGE UP (ref 5–17)
BUN SERPL-MCNC: 65 MG/DL — HIGH (ref 7–23)
CALCIUM SERPL-MCNC: 8.8 MG/DL — SIGNIFICANT CHANGE UP (ref 8.4–10.5)
CHLORIDE SERPL-SCNC: 90 MMOL/L — LOW (ref 96–108)
CO2 SERPL-SCNC: 27 MMOL/L — SIGNIFICANT CHANGE UP (ref 22–31)
CREAT SERPL-MCNC: 6.56 MG/DL — HIGH (ref 0.5–1.3)
EGFR: 8 ML/MIN/1.73M2 — LOW
GLUCOSE BLDC GLUCOMTR-MCNC: 110 MG/DL — HIGH (ref 70–99)
GLUCOSE BLDC GLUCOMTR-MCNC: 189 MG/DL — HIGH (ref 70–99)
GLUCOSE BLDC GLUCOMTR-MCNC: 261 MG/DL — HIGH (ref 70–99)
GLUCOSE SERPL-MCNC: 154 MG/DL — HIGH (ref 70–99)
HCT VFR BLD CALC: 25.8 % — LOW (ref 39–50)
HGB BLD-MCNC: 8.4 G/DL — LOW (ref 13–17)
MCHC RBC-ENTMCNC: 22.6 PG — LOW (ref 27–34)
MCHC RBC-ENTMCNC: 32.6 GM/DL — SIGNIFICANT CHANGE UP (ref 32–36)
MCV RBC AUTO: 69.4 FL — LOW (ref 80–100)
NRBC # BLD: 0 /100 WBCS — SIGNIFICANT CHANGE UP (ref 0–0)
PHOSPHATE SERPL-MCNC: 3.9 MG/DL — SIGNIFICANT CHANGE UP (ref 2.5–4.5)
PLATELET # BLD AUTO: 245 K/UL — SIGNIFICANT CHANGE UP (ref 150–400)
POTASSIUM SERPL-MCNC: 4.4 MMOL/L — SIGNIFICANT CHANGE UP (ref 3.5–5.3)
POTASSIUM SERPL-SCNC: 4.4 MMOL/L — SIGNIFICANT CHANGE UP (ref 3.5–5.3)
RBC # BLD: 3.72 M/UL — LOW (ref 4.2–5.8)
RBC # FLD: 20 % — HIGH (ref 10.3–14.5)
SODIUM SERPL-SCNC: 129 MMOL/L — LOW (ref 135–145)
WBC # BLD: 10.77 K/UL — HIGH (ref 3.8–10.5)
WBC # FLD AUTO: 10.77 K/UL — HIGH (ref 3.8–10.5)

## 2024-07-01 PROCEDURE — 99232 SBSQ HOSP IP/OBS MODERATE 35: CPT | Mod: GC

## 2024-07-01 PROCEDURE — 99232 SBSQ HOSP IP/OBS MODERATE 35: CPT

## 2024-07-01 RX ADMIN — LIDOCAINE HCL 1 PATCH: 28 GEL TOPICAL at 06:08

## 2024-07-01 RX ADMIN — ATORVASTATIN CALCIUM 20 MILLIGRAM(S): 20 TABLET, FILM COATED ORAL at 20:26

## 2024-07-01 RX ADMIN — Medication 2.5 MILLIGRAM(S): at 08:00

## 2024-07-01 RX ADMIN — HYDRALAZINE HYDROCHLORIDE 100 MILLIGRAM(S): 50 TABLET ORAL at 06:44

## 2024-07-01 RX ADMIN — PANTOPRAZOLE SODIUM 40 MILLIGRAM(S): 40 INJECTION, POWDER, FOR SOLUTION INTRAVENOUS at 20:26

## 2024-07-01 RX ADMIN — ASPIRIN 81 MILLIGRAM(S): 325 TABLET, FILM COATED ORAL at 13:16

## 2024-07-01 RX ADMIN — AMLODIPINE BESYLATE 10 MILLIGRAM(S): 2.5 TABLET ORAL at 06:43

## 2024-07-01 RX ADMIN — Medication 2.5 MILLIGRAM(S): at 21:08

## 2024-07-01 RX ADMIN — INSULIN LISPRO 1: 100 INJECTION, SOLUTION SUBCUTANEOUS at 07:52

## 2024-07-01 RX ADMIN — PIPERACILLIN SODIUM AND TAZOBACTAM SODIUM 25 GRAM(S): 3; .375 INJECTION, POWDER, LYOPHILIZED, FOR SOLUTION INTRAVENOUS at 06:41

## 2024-07-01 RX ADMIN — INSULIN LISPRO 3: 100 INJECTION, SOLUTION SUBCUTANEOUS at 12:19

## 2024-07-01 RX ADMIN — CARVEDILOL PHOSPHATE 25 MILLIGRAM(S): 80 CAPSULE, EXTENDED RELEASE ORAL at 20:27

## 2024-07-01 RX ADMIN — PIPERACILLIN SODIUM AND TAZOBACTAM SODIUM 25 GRAM(S): 3; .375 INJECTION, POWDER, LYOPHILIZED, FOR SOLUTION INTRAVENOUS at 20:26

## 2024-07-01 RX ADMIN — Medication 1 APPLICATION(S): at 06:44

## 2024-07-01 RX ADMIN — HYDRALAZINE HYDROCHLORIDE 100 MILLIGRAM(S): 50 TABLET ORAL at 20:27

## 2024-07-01 RX ADMIN — MENTHOL, METHYL SALICYLATE 1 APPLICATION(S): 63; 210 PATCH PERCUTANEOUS; TOPICAL; TRANSDERMAL at 13:24

## 2024-07-01 RX ADMIN — Medication 12.5 MILLIGRAM(S): at 20:27

## 2024-07-01 RX ADMIN — ERYTHROPOIETIN 10000 UNIT(S): 4000 INJECTION, SOLUTION INTRAVENOUS; SUBCUTANEOUS at 06:43

## 2024-07-01 RX ADMIN — HYDRALAZINE HYDROCHLORIDE 100 MILLIGRAM(S): 50 TABLET ORAL at 13:16

## 2024-07-01 RX ADMIN — Medication 650 MILLIGRAM(S): at 08:09

## 2024-07-01 RX ADMIN — Medication 2 TABLET(S): at 20:26

## 2024-07-01 RX ADMIN — MENTHOL, METHYL SALICYLATE 1 APPLICATION(S): 63; 210 PATCH PERCUTANEOUS; TOPICAL; TRANSDERMAL at 06:44

## 2024-07-01 RX ADMIN — CARVEDILOL PHOSPHATE 25 MILLIGRAM(S): 80 CAPSULE, EXTENDED RELEASE ORAL at 06:43

## 2024-07-01 RX ADMIN — APIXABAN 5 MILLIGRAM(S): 5 TABLET, FILM COATED ORAL at 20:27

## 2024-07-01 RX ADMIN — Medication 1 TABLET(S): at 13:16

## 2024-07-01 RX ADMIN — MODAFINIL 50 MILLIGRAM(S): 100 TABLET ORAL at 06:43

## 2024-07-01 RX ADMIN — INSULIN GLARGINE 5 UNIT(S): 100 INJECTION, SOLUTION SUBCUTANEOUS at 07:55

## 2024-07-01 RX ADMIN — Medication 2.5 MILLIGRAM(S): at 05:14

## 2024-07-01 RX ADMIN — PANTOPRAZOLE SODIUM 40 MILLIGRAM(S): 40 INJECTION, POWDER, FOR SOLUTION INTRAVENOUS at 06:43

## 2024-07-01 RX ADMIN — Medication 300 MILLIGRAM(S): at 13:16

## 2024-07-01 RX ADMIN — APIXABAN 5 MILLIGRAM(S): 5 TABLET, FILM COATED ORAL at 06:44

## 2024-07-01 RX ADMIN — Medication 650 MILLIGRAM(S): at 11:13

## 2024-07-01 NOTE — PROGRESS NOTE ADULT - SUBJECTIVE AND OBJECTIVE BOX
Patient is a 73y old  Male who presents with a chief complaint of acute respiratory failure, intubation, sepsis, CRRT, DVT, GIB, trach and PEG placement, He is admitted for acute multidisciplinary rehab (29 Jun 2024 22:19)      Subjective and overnight events:  Patient seen and examined at bedside. No overnight events reported. Nods no to any pain.      ALLERGIES:  No Known Allergies    MEDICATIONS  (STANDING):  albuterol    0.083% 2.5 milliGRAM(s) Nebulizer every 6 hours  amLODIPine   Tablet 10 milliGRAM(s) Oral daily  apixaban 5 milliGRAM(s) Enteral Tube two times a day  aspirin  chewable 81 milliGRAM(s) Oral daily  atorvastatin 20 milliGRAM(s) Oral at bedtime  carvedilol 25 milliGRAM(s) Oral every 12 hours  chlorhexidine 2% Cloths 1 Application(s) Topical <User Schedule>  dextrose 10% Bolus 125 milliLiter(s) IV Bolus once  dextrose 5%. 1000 milliLiter(s) (100 mL/Hr) IV Continuous <Continuous>  dextrose 5%. 1000 milliLiter(s) (50 mL/Hr) IV Continuous <Continuous>  dextrose 50% Injectable 25 Gram(s) IV Push once  dextrose 50% Injectable 12.5 Gram(s) IV Push once  epoetin reilly-epbx (RETACRIT) Injectable 02418 Unit(s) IV Push <User Schedule>  ferrous    sulfate Liquid 300 milliGRAM(s) Enteral Tube daily  glucagon  Injectable 1 milliGRAM(s) IntraMuscular once  hydrALAZINE 100 milliGRAM(s) Oral three times a day  insulin glargine Injectable (LANTUS) 5 Unit(s) SubCutaneous every morning  insulin lispro (ADMELOG) corrective regimen sliding scale   SubCutaneous three times a day before meals  lidocaine   4% Patch 1 Patch Transdermal every 24 hours  lidocaine   4% Patch 1 Patch Transdermal every 24 hours  methyl salicylate 15%/menthol 10% Topical Cream 1 Application(s) Topical <User Schedule>  modafinil 50 milliGRAM(s) Oral <User Schedule>  Nephro-noam 1 Tablet(s) Oral daily  pantoprazole   Suspension 40 milliGRAM(s) Enteral Tube two times a day  piperacillin/tazobactam IVPB.. 3.375 Gram(s) IV Intermittent every 12 hours  polyethylene glycol 3350 17 Gram(s) Oral daily  QUEtiapine 12.5 milliGRAM(s) Oral at bedtime  senna 2 Tablet(s) Oral at bedtime    MEDICATIONS  (PRN):  acetaminophen   Oral Liquid .. 650 milliGRAM(s) Oral every 6 hours PRN Moderate Pain (4 - 6)  bisacodyl Suppository 10 milliGRAM(s) Rectal daily PRN Constipation  dextrose Oral Gel 15 Gram(s) Oral once PRN Blood Glucose LESS THAN 70 milliGRAM(s)/deciliter  dextrose Oral Gel 15 Gram(s) Oral once PRN Blood Glucose LESS THAN 70 milliGRAM(s)/deciliter  melatonin 3 milliGRAM(s) Oral at bedtime PRN Insomnia    Vital Signs Last 24 Hrs  T(C): 36.6 (01 Jul 2024 08:57), Max: 36.6 (01 Jul 2024 08:57)  T(F): 97.9 (01 Jul 2024 08:57), Max: 97.9 (01 Jul 2024 08:57)  HR: 55 (01 Jul 2024 13:15) (53 - 58)  BP: 114/63 (01 Jul 2024 13:15) (102/59 - 117/64)  BP(mean): --  RR: 16 (01 Jul 2024 08:57) (15 - 16)  SpO2: 99% (01 Jul 2024 08:57) (96% - 99%)    Parameters below as of 01 Jul 2024 08:57  Patient On (Oxygen Delivery Method): trach 30%     PHYSICAL EXAM:  General: NAD, Awake alert.   ENT: MMM  Neck: Supple, No JVD  Lungs: Clear to auscultation bilaterally  Cardio: RRR, S1/S2, No murmurs  Abdomen: Soft, Nontender, Nondistended; Bowel sounds present  Extremities: No calf tenderness, No pitting edema    LABS:                                   8.6    13.95 )-----------( 245      ( 30 Jun 2024 00:00 )             27.6         Lactate, Blood: 1.4 mmol/L (06-30 @ 00:00)      Urinalysis Basic - ( 28 Jun 2024 14:00 )    Color: x / Appearance: x / SG: x / pH: x  Gluc: 259 mg/dL / Ketone: x  / Bili: x / Urobili: x   Blood: x / Protein: x / Nitrite: x   Leuk Esterase: x / RBC: x / WBC x   Sq Epi: x / Non Sq Epi: x / Bacteria: x        COVID-19 PCR: NotDetec (06-14-24 @ 18:56)      RADIOLOGY & ADDITIONAL TESTS:    Care Discussed with Consultants/Other Providers: rehab provider

## 2024-07-01 NOTE — PROGRESS NOTE ADULT - SUBJECTIVE AND OBJECTIVE BOX
73-year-old male w/ past medical history hypertension, ESRD (HD MWF)  insulin-dependent DM presented 4/29/24 to [facility] with hiccups, chest pain of several days duration, admitted for concern demand ischemia. Early on, he was given baclofen for his hiccups, but soon developed AMS with 2 RRTs by 5/2/24, prompting intubation, upgrade to MICU. There was concern that baclofen toxicity was the culprit of his then-encephalopathic state, as patient had also missed a HD session due to a clotted fistula.     In coming days, he was treated empirically with 5 days of zosyn, had a negative CTH and LP, and had an EEG that did not capture a seizure but showed diffuse non-specific cerebral dysfunction - c/w toxic-metabolic encephalopathies. On 5/6, an MRI showed that the patient had sustained R pontine and cerebellar stroke. He was extubated on 5/9, but owing to a challenging extubation became septic w/ B Cereus, treated with Vanc. He had trach placed 5/14, PEG 5/17, then downgraded.     On the floors, he was followed by several services, including Neurology, who mention the strokes were embolic appearing. Subsequent hospital course notable for nonocclusive R common iliac DVT, for which he was started on a heparin drip which was stopped when patient developed an upper GI Bleed, though GI was consulted and felt he did not have an overt bleed. His dialysis fistula site was stenotic, s/p fistulogram, ultimately changed to a RUE AVF 6/5. He was ultimately started on Eliquis 6/7 for the DVT. Medically stable, he is admitted to [facility] on 6/14/24.     ROS/subjective:  patient seen and evaluated bedside  low grade temps over weekend , increased secretions-started on IV zosyn  today reports pain over sacrum  Seen in bed the trach collar on- minimal OOB over weekend  intermittently refusing meds  no hallucinations   provigil in AM only  refused [place] for Knees  last BM 6/30  no dizziness, no headaches, no nausea, no vomiting, no SOB, no chest pain      MEDICATIONS  (STANDING):  albuterol    0.083% 2.5 milliGRAM(s) Nebulizer every 6 hours  amLODIPine   Tablet 10 milliGRAM(s) Oral daily  apixaban 5 milliGRAM(s) Enteral Tube two times a day  aspirin  chewable 81 milliGRAM(s) Oral daily  atorvastatin 20 milliGRAM(s) Oral at bedtime  carvedilol 25 milliGRAM(s) Oral every 12 hours  chlorhexidine 2% Cloths 1 Application(s) Topical <User Schedule>  dextrose 10% Bolus 125 milliLiter(s) IV Bolus once  dextrose 5%. 1000 milliLiter(s) (100 mL/Hr) IV Continuous <Continuous>  dextrose 5%. 1000 milliLiter(s) (50 mL/Hr) IV Continuous <Continuous>  dextrose 50% Injectable 25 Gram(s) IV Push once  dextrose 50% Injectable 12.5 Gram(s) IV Push once  epoetin reilly-epbx (RETACRIT) Injectable 70637 Unit(s) IV Push <User Schedule>  ferrous    sulfate Liquid 300 milliGRAM(s) Enteral Tube daily  glucagon  Injectable 1 milliGRAM(s) IntraMuscular once  hydrALAZINE 100 milliGRAM(s) Oral three times a day  insulin glargine Injectable (LANTUS) 5 Unit(s) SubCutaneous every morning  insulin lispro (ADMELOG) corrective regimen sliding scale   SubCutaneous three times a day before meals  lidocaine   4% Patch 1 Patch Transdermal every 24 hours  lidocaine   4% Patch 1 Patch Transdermal every 24 hours  methyl salicylate 15%/menthol 10% Topical Cream 1 Application(s) Topical <User Schedule>  modafinil 50 milliGRAM(s) Oral <User Schedule>  Nephro-noam 1 Tablet(s) Oral daily  pantoprazole   Suspension 40 milliGRAM(s) Enteral Tube two times a day  piperacillin/tazobactam IVPB.. 3.375 Gram(s) IV Intermittent every 12 hours  polyethylene glycol 3350 17 Gram(s) Oral daily  QUEtiapine 12.5 milliGRAM(s) Oral at bedtime  senna 2 Tablet(s) Oral at bedtime    MEDICATIONS  (PRN):  acetaminophen   Oral Liquid .. 650 milliGRAM(s) Oral every 6 hours PRN Moderate Pain (4 - 6)  bisacodyl Suppository 10 milliGRAM(s) Rectal daily PRN Constipation  dextrose Oral Gel 15 Gram(s) Oral once PRN Blood Glucose LESS THAN 70 milliGRAM(s)/deciliter  dextrose Oral Gel 15 Gram(s) Oral once PRN Blood Glucose LESS THAN 70 milliGRAM(s)/deciliter  melatonin 3 milliGRAM(s) Oral at bedtime PRN Insomnia                            8.6    13.95 )-----------( 245      ( 30 Jun 2024 00:00 )             27.6               CAPILLARY BLOOD GLUCOSE      POCT Blood Glucose.: 189 mg/dL (01 Jul 2024 07:41)  POCT Blood Glucose.: 230 mg/dL (30 Jun 2024 21:52)  POCT Blood Glucose.: 134 mg/dL (30 Jun 2024 17:18)      Vital Signs Last 24 Hrs  T(C): 36.6 (01 Jul 2024 08:57), Max: 36.6 (01 Jul 2024 08:57)  T(F): 97.9 (01 Jul 2024 08:57), Max: 97.9 (01 Jul 2024 08:57)  HR: 56 (01 Jul 2024 08:57) (53 - 58)  BP: 111/54 (01 Jul 2024 08:57) (102/59 - 117/64)  BP(mean): --  RR: 16 (01 Jul 2024 08:57) (15 - 16)  SpO2: 99% (01 Jul 2024 08:57) (96% - 99%)    Parameters below as of 01 Jul 2024 08:57  Patient On (Oxygen Delivery Method): trach 30%       Constitutional - NAD, Comfortable with trach collar capped  HEENT - NCAT,   Neck - Supple, No limited ROM  Chest - good chest expansion, good respiratory effort coarse BS  Cardio - warm and well perfused,   Abdomen -  Soft, NTND. + peg.   Extremities - No peripheral edema, No calf tenderness. RUE fistula.   Neurologic Exam:                    Cognitive -   Withdrawn this AM- reports poor sleep- refuses meds/ Tx     Speech - Fluent, but minimal output     Cranial Nerves - No facial asymmetry, Tongue midline, Shoulder shrug intact no facial droop     Motor -                      LEFT    UE - ShAB 4/5, EF 4/5, EE 3/5, WE 4/5,  WNL                    RIGHT UE - ShAB 4/5, EF 4/5, EE 3/5, WE 4/5,  WNL                    LEFT    LE - HF 3/5, KE 4/5, DF 4/5, PF 4/5                    RIGHT LE - HF 3/5, KE 4/5, DF 4/5, PF 4/5        Sensory - Intact to LT bilateral in face, arm and legs.      Reflexes - DTR 1 / 4 biceps symmetric. neg Latham's b/l, No clonus.  Psychiatric - Mood stable, Affect WNL  Skin on admission: no sacral pressure injuries.      IDT in [facility] 6/26  Tentative DC 7/9 to home- ? KIMI

## 2024-07-01 NOTE — PROGRESS NOTE ADULT - SUBJECTIVE AND OBJECTIVE BOX
Follow-up Pulmonary Progress Note  Chief Complaint : Cerebral infarction      patient seen and examined  On n/c   2 L   PMV in place  RN/staff state less secretions  pateint in good spirits and states feels well  sat 95% on n/c    Allergies :No Known Allergies      PAST MEDICAL & SURGICAL HISTORY:  Diabetes    Benign essential HTN    HLD (hyperlipidemia)    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    ESRD on hemodialysis    Arteriovenous fistula        Medications:  MEDICATIONS  (STANDING):  albuterol    0.083% 2.5 milliGRAM(s) Nebulizer every 6 hours  amLODIPine   Tablet 10 milliGRAM(s) Oral daily  apixaban 5 milliGRAM(s) Enteral Tube two times a day  aspirin  chewable 81 milliGRAM(s) Oral daily  atorvastatin 20 milliGRAM(s) Oral at bedtime  carvedilol 25 milliGRAM(s) Oral every 12 hours  chlorhexidine 2% Cloths 1 Application(s) Topical <User Schedule>  dextrose 10% Bolus 125 milliLiter(s) IV Bolus once  dextrose 5%. 1000 milliLiter(s) (50 mL/Hr) IV Continuous <Continuous>  dextrose 5%. 1000 milliLiter(s) (100 mL/Hr) IV Continuous <Continuous>  dextrose 50% Injectable 12.5 Gram(s) IV Push once  dextrose 50% Injectable 25 Gram(s) IV Push once  epoetin reilly-epbx (RETACRIT) Injectable 94913 Unit(s) IV Push <User Schedule>  ferrous    sulfate Liquid 300 milliGRAM(s) Enteral Tube daily  glucagon  Injectable 1 milliGRAM(s) IntraMuscular once  hydrALAZINE 100 milliGRAM(s) Oral three times a day  insulin glargine Injectable (LANTUS) 5 Unit(s) SubCutaneous every morning  insulin lispro (ADMELOG) corrective regimen sliding scale   SubCutaneous three times a day before meals  lidocaine   4% Patch 1 Patch Transdermal every 24 hours  lidocaine   4% Patch 1 Patch Transdermal every 24 hours  methyl salicylate 15%/menthol 10% Topical Cream 1 Application(s) Topical <User Schedule>  modafinil 50 milliGRAM(s) Oral <User Schedule>  Nephro-noam 1 Tablet(s) Oral daily  pantoprazole   Suspension 40 milliGRAM(s) Enteral Tube two times a day  piperacillin/tazobactam IVPB.. 3.375 Gram(s) IV Intermittent every 12 hours  polyethylene glycol 3350 17 Gram(s) Oral daily  QUEtiapine 12.5 milliGRAM(s) Oral at bedtime  senna 2 Tablet(s) Oral at bedtime    MEDICATIONS  (PRN):  acetaminophen   Oral Liquid .. 650 milliGRAM(s) Oral every 6 hours PRN Moderate Pain (4 - 6)  bisacodyl Suppository 10 milliGRAM(s) Rectal daily PRN Constipation  dextrose Oral Gel 15 Gram(s) Oral once PRN Blood Glucose LESS THAN 70 milliGRAM(s)/deciliter  dextrose Oral Gel 15 Gram(s) Oral once PRN Blood Glucose LESS THAN 70 milliGRAM(s)/deciliter  melatonin 3 milliGRAM(s) Oral at bedtime PRN Insomnia      Antibiotics History  piperacillin/tazobactam IVPB.. 3.375 Gram(s) IV Intermittent every 12 hours, 06-29-24 @ 22:03, Stop order after: 7 Days      Heme Medications   apixaban 5 milliGRAM(s) Enteral Tube two times a day, 06-14-24 @ 18:29  aspirin  chewable 81 milliGRAM(s) Oral daily, 06-15-24 @ 00:00      GI Medications  bisacodyl Suppository 10 milliGRAM(s) Rectal daily, 06-14-24 @ 18:30, Routine PRN  pantoprazole   Suspension 40 milliGRAM(s) Enteral Tube two times a day, 06-14-24 @ 21:14, Routine  polyethylene glycol 3350 17 Gram(s) Oral daily, 06-15-24 @ 00:00, Routine  senna 2 Tablet(s) Oral at bedtime, 06-14-24 @ 18:29, Routine        LABS:                        8.6    13.95 )-----------( 245      ( 30 Jun 2024 00:00 )             27.6        CULTURES: (if applicable)    Culture - Sputum (collected 06-30-24 @ 09:00)  Source: .Sputum Sputum  Gram Stain (06-30-24 @ 23:39):    Numerous polymorphonuclear leukocytes per low power field    Few Squamous epithelial cells per low power field    Few Gram Negative Rods per oil power field    Few Gram positive cocci in pairs per oil power field    Culture - Blood (collected 06-30-24 @ 00:00)  Source: .Blood Blood-Peripheral  Preliminary Report (07-01-24 @ 11:01):    No growth at 24 hours    Culture - Blood (collected 06-30-24 @ 00:00)  Source: .Blood Blood-Peripheral  Preliminary Report (07-01-24 @ 11:01):    No growth at 24 hours      Rapid RVP Result: Bethte (06-29-24 @ 22:30)        CAPILLARY BLOOD GLUCOSE      POCT Blood Glucose.: 189 mg/dL (01 Jul 2024 07:41)         VITALS:  T(C): 36.6 (07-01-24 @ 08:57), Max: 36.6 (07-01-24 @ 08:57)  T(F): 97.9 (07-01-24 @ 08:57), Max: 97.9 (07-01-24 @ 08:57)  HR: 56 (07-01-24 @ 08:57) (53 - 58)  BP: 111/54 (07-01-24 @ 08:57) (102/59 - 117/64)  BP(mean): --  ABP: --  ABP(mean): --  RR: 16 (07-01-24 @ 08:57) (15 - 16)  SpO2: 99% (07-01-24 @ 08:57) (96% - 99%)  CVP(mm Hg): --  CVP(cm H2O): --    Ins and Outs     06-30-24 @ 07:01  -  07-01-24 @ 07:00  --------------------------------------------------------  IN: 820 mL / OUT: 0 mL / NET: 820 mL    07-01-24 @ 07:01  -  07-01-24 @ 11:48  --------------------------------------------------------  IN: 477 mL / OUT: 0 mL / NET: 477 mL                I&O's Detail    30 Jun 2024 07:01  -  01 Jul 2024 07:00  --------------------------------------------------------  IN:    Free Water: 580 mL    Nepro: 240 mL  Total IN: 820 mL    OUT:  Total OUT: 0 mL    Total NET: 820 mL      01 Jul 2024 07:01  -  01 Jul 2024 11:48  --------------------------------------------------------  IN:    Free Water: 240 mL    Nepro: 237 mL  Total IN: 477 mL    OUT:  Total OUT: 0 mL    Total NET: 477 mL

## 2024-07-01 NOTE — PROGRESS NOTE ADULT - ASSESSMENT
73-year-old male w/ past medical history hypertension, ESRD (HD MWF)  insulin-dependent DM presented 4/29/24 to [facility] 4/29 with hiccups and demand ischemia, was treated with baclofen, developed what appears to be toxic encephalopathy. He then sustained a prolonged, complex hospital course over approx 6 weeks, notable for acute respiratory failure, intubation, sepsis, CRRT, DVT, GIB, trach and PEG placement, He is admitted for acute multidisciplinary rehab on 6/14/24 to [facility].     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  #Hospital Acquired Debility   #Dysphagia  #Gait, ADL, Functional impairments  - Comprehensive Multidisciplinary Rehab, PT/OT/ SLP 3 hr/day 5d/wk  - AC: Eliquis 5 BID for DVT, seen on CTA/P 5/12  - ASA81 qD  - Pain: PRN Tylenol, Lidocaine patch   - Absolute Avoidance of Baclofen   - NPO/Tube Feeds as below    #Acute Hypoxemic, Hypercapnic Respiratory Failure, s/p ETT intubation  #s/p tracheostomy   - Albuterol neb q6h   - Continue TC w/ ATC  increased secretions over weekend- started on Zosyn for elevated WBC 13K  Continue antibiotics x 5 days  PMV then Cap in AM if less secretions- with nasal O2    #ESRD on HD  #Fistula stenosis   #Anemia   - Continue HD M/W/F per renal   - Access is RUE AVF  - Regular monitoring of electrolytes  - EPOGEN w/ HD  - Iron supplementation, 300 mg daily   HD today    #HTN  - Coreg 25 q12h  - Hydralazine 100 mg TID  - Norvasc 10 mg/D  - Isordil would be next agent added  BPs Stable    #NSTEMI, Demand Ischemia, resolved  - Initially, troponins mildly elevated  - TTE (4/30): EF 72, normal, no regional wall motion abnormalities   - No need to pursue cath at this time   - 20 mg lipitor at bedtime     #IDDM2, A1c 6.9  - NPH 4u q6h  - FS, ISS TIDAC  Glucoses OK with coverage    #R Common Iliac DVT  - Initially treated with heparin but then developed questionable GIB, transitioned to Eliquis   - IR was consulted, no plan for IVC filter  - Continue Eliquis 5 mg BID     #GI  - Concern melena 5/27, resolved, transfused   - GI consulted, not candidate for endoscopy due to surgical risk  - Concern for upper GI Bleed, GI felt not true bleed  - Continue PPI BID   - MIralax 17g daily  - Senna 2 @ bedtime   - Ducolax suppository daily PRN   last BM 6/27    #Oropharyngeal Dysphagia  #s/p PEG (5/17)  - Failed green dye 6/1   - Continue feeds: Card Steady 10 cc/hr incr. q6 goal 45cc/hr - feeds changed to Bolus- tolerating no signs of aspiration  [facility] 6/20- cleared for minced moist with mild thicks  supplement PO with G feeds if needed  speech to reassess- ? advance diet      #Mood / Cognition:  - Neuropsych evaluation given prolonged and complex hospitalization  - Chart mentions concern for depression w/ possible suicidal ideation   no further hallucinations  took Seroquel last night- claims poor sleep-  refuses meds/ foods/ therapy  wife has refused using antidepressant  will resume Provigil in AM only    #/Bladder:  - Check urinary status on arrival, possibly anuric   spont voiding    #Dysphagia:    - Diet: tube feeds/ minced moist with mild thicks  SP MBS  - Ongoing SLP assessment- speech to advance diet if able  - Nutrition to follow  OOB For ALL MEALS    #Skin/ Pressure Injury Prevention:  - Assessment on admission   - Incisions:    #Precautions/ Restrictions  - Falls, Aspiration Precautions   - COVID PCR:

## 2024-07-01 NOTE — PROGRESS NOTE ADULT - SUBJECTIVE AND OBJECTIVE BOX
No distress    Vital Signs Last 24 Hrs  T(C): 36.5 (07-01-24 @ 20:28), Max: 36.6 (07-01-24 @ 08:57)  T(F): 97.7 (07-01-24 @ 20:28), Max: 97.9 (07-01-24 @ 08:57)  HR: 54 (07-01-24 @ 21:51) (50 - 60)  BP: 126/70 (07-01-24 @ 20:28) (111/54 - 136/67)  RR: 15 (07-01-24 @ 20:28) (15 - 18)  SpO2: 97% (07-01-24 @ 21:51) (96% - 100%)    I&O's Detail    30 Jun 2024 07:01  -  01 Jul 2024 07:00  --------------------------------------------------------  IN:    Free Water: 580 mL    Nepro: 240 mL  Total IN: 820 mL    OUT:  Total OUT: 0 mL    01 Jul 2024 07:01  -  01 Jul 2024 23:59  --------------------------------------------------------  IN:    Free Water: 240 mL    Nepro: 474 mL  Total IN: 714 mL    OUT:    Other (mL): 1500 mL  Total OUT: 1500 mL    Lungs b/l air entry  Heart S1S2  Abd soft ND  Extremities: tr edema  RUE avf                                                                     8.4    10.77 )-----------( 245      ( 01 Jul 2024 16:00 )             25.8     01 Jul 2024 16:00    129    |  90     |  65     ----------------------------<  154    4.4     |  27     |  6.56     Ca    8.8        01 Jul 2024 16:00  Phos  3.9       01 Jul 2024 16:00    Culture - Sputum (collected 30 Jun 2024 09:00)  Source: .Sputum Sputum  Gram Stain (30 Jun 2024 23:39):    Numerous polymorphonuclear leukocytes per low power field    Few Squamous epithelial cells per low power field    Few Gram Negative Rods per oil power field    Few Gram positive cocci in pairs per oil power field  Preliminary Report (01 Jul 2024 12:01):    Normal Respiratory Jocelyn present    Culture - Blood (collected 30 Jun 2024 00:00)  Source: .Blood Blood-Peripheral  Preliminary Report (01 Jul 2024 11:01):    No growth at 24 hours    Culture - Blood (collected 30 Jun 2024 00:00)  Source: .Blood Blood-Peripheral  Preliminary Report (01 Jul 2024 11:01):    No growth at 24 hours    acetaminophen   Oral Liquid .. 650 milliGRAM(s) Oral every 6 hours PRN  albuterol    0.083% 2.5 milliGRAM(s) Nebulizer every 6 hours  amLODIPine   Tablet 10 milliGRAM(s) Oral daily  apixaban 5 milliGRAM(s) Enteral Tube two times a day  aspirin  chewable 81 milliGRAM(s) Oral daily  atorvastatin 20 milliGRAM(s) Oral at bedtime  bisacodyl Suppository 10 milliGRAM(s) Rectal daily PRN  carvedilol 25 milliGRAM(s) Oral every 12 hours  chlorhexidine 2% Cloths 1 Application(s) Topical <User Schedule>  dextrose 10% Bolus 125 milliLiter(s) IV Bolus once  dextrose 5%. 1000 milliLiter(s) IV Continuous <Continuous>  dextrose 5%. 1000 milliLiter(s) IV Continuous <Continuous>  dextrose 50% Injectable 12.5 Gram(s) IV Push once  dextrose 50% Injectable 25 Gram(s) IV Push once  dextrose Oral Gel 15 Gram(s) Oral once PRN  dextrose Oral Gel 15 Gram(s) Oral once PRN  epoetin reilly-epbx (RETACRIT) Injectable 03865 Unit(s) IV Push <User Schedule>  ferrous    sulfate Liquid 300 milliGRAM(s) Enteral Tube daily  glucagon  Injectable 1 milliGRAM(s) IntraMuscular once  hydrALAZINE 100 milliGRAM(s) Oral three times a day  insulin glargine Injectable (LANTUS) 5 Unit(s) SubCutaneous every morning  insulin lispro (ADMELOG) corrective regimen sliding scale   SubCutaneous three times a day before meals  lidocaine   4% Patch 1 Patch Transdermal every 24 hours  lidocaine   4% Patch 1 Patch Transdermal every 24 hours  melatonin 3 milliGRAM(s) Oral at bedtime PRN  methyl salicylate 15%/menthol 10% Topical Cream 1 Application(s) Topical <User Schedule>  modafinil 50 milliGRAM(s) Oral <User Schedule>  Nephro-noam 1 Tablet(s) Oral daily  pantoprazole   Suspension 40 milliGRAM(s) Enteral Tube two times a day  piperacillin/tazobactam IVPB.. 3.375 Gram(s) IV Intermittent every 12 hours  polyethylene glycol 3350 17 Gram(s) Oral daily  QUEtiapine 12.5 milliGRAM(s) Oral at bedtime  senna 2 Tablet(s) Oral at bedtime    A/P:    ESRD on HD  S/p complicated hospital course as per HPI  S/p CVA, trach, PEG  HD MWF  TMP 1.5kg w/HD today  Epoetin w/HD 3 x week  Bld work w/each HD   Rehab    [phone]

## 2024-07-01 NOTE — PROGRESS NOTE ADULT - ASSESSMENT
Physical Exam   GENERAL:               Alert,  No acute distress.    HEENT:                   No JVD, Dry MM + trach with no secretion   PULM:                     Bilateral air entry, diminished to auscultation bilaterally, no significant sputum production, No Rales, No Rhonchi, No Wheezing  CVS:                         S1, S2,  No Murmur  ABD:                        Soft, nondistended, nontender, normoactive bowel sounds, + PEG  EXT:                         No edema, nontender, No Cyanosis or Clubbing    NEURO:                  Alert,  interactive,  follows commands  PSYC:                      Calm,      Assessment  1. Chronic Respiratory failure s/p Trach  2. AMS appears to have improved , ? Baclofen toxicity vs CVA  3. ESRD on HD, DM 2,   4. Right iliac DVT on Eliquis      Plan   Finish 5-7 Day course of abx  Restarted on PMV, n/c o2 while cappeed  f/u sputum culture  repeat cxr if worsen or in few days    avoid frequent tracheal suctioning ; Oral suction ok,   trach care  o2 Viat TC to keep humidity and sat > 92%  PMV with therapies and day time hours appears to be tolerating     continue Eliquis for DVT  d/w RN   will follow  d/w family bedside  d.w Bedside team - NP

## 2024-07-01 NOTE — CHART NOTE - NSCHARTNOTEFT_GEN_A_CORE
Nutrition Follow Up Note  Hospital Course   (Per Electronic Medical Record)    Source:  Patient Asleepx3  Speech Therapy [X]   Nursing Staff [X]   Medical Record [X]      Diet:   Renal Minced & Moist (IDDSI Level 5/Mechanical Soft/Dysphagia 2) Diet w/ Mildly Thick Liquids (IDDSI Level 2/Nectar Thick)  Tolerates Diet Consistency Well - Discussed w/ Speech Therapy   No Chewing/Swallowing Difficulties - (Per Patient Observation)   Varied Intake @Meals (as Per Documentation) - States Fair Intake (Per Nursing Staff)   Receives Supplemental Tubefeeding PRN    Enteral/Parenteral Nutrition:   If Patient Consumes Less than 50% of Meal, Provide Bolus Feed Nepro 247ml  Tolerates Tubefeeding Well  Refused Tubefeeding @Times - Per Nursing Staff   Manual Flush 240ml Q8hrs    Current Weight: 105.1lb on 6/30  Obtain Weights Daily  Weight Fluctuates     Pertinent Medications: MEDICATIONS  (STANDING):  albuterol    0.083% 2.5 milliGRAM(s) Nebulizer every 6 hours  amLODIPine   Tablet 10 milliGRAM(s) Oral daily  apixaban 5 milliGRAM(s) Enteral Tube two times a day  aspirin  chewable 81 milliGRAM(s) Oral daily  atorvastatin 20 milliGRAM(s) Oral at bedtime  carvedilol 25 milliGRAM(s) Oral every 12 hours  chlorhexidine 2% Cloths 1 Application(s) Topical <User Schedule>  dextrose 10% Bolus 125 milliLiter(s) IV Bolus once  dextrose 5%. 1000 milliLiter(s) (50 mL/Hr) IV Continuous <Continuous>  dextrose 5%. 1000 milliLiter(s) (100 mL/Hr) IV Continuous <Continuous>  dextrose 50% Injectable 12.5 Gram(s) IV Push once  dextrose 50% Injectable 25 Gram(s) IV Push once  epoetin reilly-epbx (RETACRIT) Injectable 21889 Unit(s) IV Push <User Schedule>  ferrous    sulfate Liquid 300 milliGRAM(s) Enteral Tube daily  glucagon  Injectable 1 milliGRAM(s) IntraMuscular once  hydrALAZINE 100 milliGRAM(s) Oral three times a day  insulin glargine Injectable (LANTUS) 5 Unit(s) SubCutaneous every morning  insulin lispro (ADMELOG) corrective regimen sliding scale   SubCutaneous three times a day before meals  lidocaine   4% Patch 1 Patch Transdermal every 24 hours  lidocaine   4% Patch 1 Patch Transdermal every 24 hours  methyl salicylate 15%/menthol 10% Topical Cream 1 Application(s) Topical <User Schedule>  modafinil 50 milliGRAM(s) Oral <User Schedule>  Nephro-noam 1 Tablet(s) Oral daily  pantoprazole   Suspension 40 milliGRAM(s) Enteral Tube two times a day  piperacillin/tazobactam IVPB.. 3.375 Gram(s) IV Intermittent every 12 hours  polyethylene glycol 3350 17 Gram(s) Oral daily  QUEtiapine 12.5 milliGRAM(s) Oral at bedtime  senna 2 Tablet(s) Oral at bedtime    MEDICATIONS  (PRN):  acetaminophen   Oral Liquid .. 650 milliGRAM(s) Oral every 6 hours PRN Moderate Pain (4 - 6)  bisacodyl Suppository 10 milliGRAM(s) Rectal daily PRN Constipation  dextrose Oral Gel 15 Gram(s) Oral once PRN Blood Glucose LESS THAN 70 milliGRAM(s)/deciliter  dextrose Oral Gel 15 Gram(s) Oral once PRN Blood Glucose LESS THAN 70 milliGRAM(s)/deciliter  melatonin 3 milliGRAM(s) Oral at bedtime PRN Insomnia    Pertinent Labs:  06-28 Na135 mmol/L Glu 259 mg/dL<H> K+ 3.6 mmol/L Cr  5.45 mg/dL<H> BUN 41 mg/dL<H> 06-28 Phos 3.2 mg/dL 06-28 Alb 2.7 g/dL<L>    POCT (over Last 3 Days) - Ranging from 134-318    Skin: No Pressure Ulcers     Edema: None Noted (as Per Documentation)     Last Bowel Movement: on 6/30    Estimated Needs:   [X] No Change Since Previous Assessment    Previous Nutrition Diagnosis:   Severe Malnutrition  Swallowing Difficulties    Nutrition Diagnosis is [X] Ongoing - Continues on Supplemental Tubefeeding & Thickened Liquids     New Nutrition Diagnosis: [X] Not Applicable    Interventions:   1. Recommend Continue Nutrition Plan of Care     Monitoring & Evaluation:   [X] Weights   [X] PO Intake   [X] Skin Integrity   [X] Follow Up (Per Protocol)  [X] Tolerance to Diet & Tubefeeding Prescription   [X] Other: Labs & POCT    Registered Dietitian/Nutritionist Remains Available.  Rubén Borden, DONIN, CDN    Phone# [phone]

## 2024-07-01 NOTE — PROGRESS NOTE ADULT - ASSESSMENT
73M HTN, ESRD (HD MWF via AVG) DM who was admitted for unstable angina further c/b altered mental status requiring intubation with CRRT requriment, acute CVA vs baclofen toxicity, DVT and GIB s/p PEG/trach. Notable course for AV Fistula dysfunction requiring balloon angioplasty. Now in AR.     #Recent AMS suspect due to baclofen toxicity vs CVA  #Acute CVA  - MRI head 5/6 showed acute infarct in left talya and left cerebellum   - continue holding baclofen  - continue ASA and statin     #DM2   - hypoglycemia on 6/23 resolved   - A1c 6.9  - now with intermittent hyperglycemia. will restart low dose Lantus 5unit qAM  - continue ISS    #Acute Hypoxemic, Hypercapnic Respiratory Failure, s/p ETT intubation  #increased secretions, sialorrhea  #Aspiration PNA?  -recent low grade fever, leukocytosis, elevated procal, CXR with left sided opacity now on zosyn empirically Day 2/5  - scopolamine patch  - s/p tracheostomy   - Albuterol neb q6h   - pulm on board  - oral suction, minimize trach suctioning     #ESRD on HD MWF via RT AFF  - stable, continue HD    #Hallucination  - Seroquel 12.5mg HS  - Psych following    #HTN  - Coreg 25 q12h  - Hydralazine 100 mg TID  - Norvasc 10 mg/D  - BP acceptable     #R Common Iliac DVT  - CT AP (5/12) with nonocclusive RIGHT common iliac vein deep venous thrombosis  - Continue Eliquis 5 mg BID     #Oropharyngeal Dysphagia  - s/p PEG (5/17)  - Continue PO diet and supplement with TF if po intake is poor    DVT PPX: Eliquis

## 2024-07-02 ENCOUNTER — APPOINTMENT (OUTPATIENT)
Dept: ENDOCRINOLOGY | Facility: CLINIC | Age: 74
End: 2024-07-02

## 2024-07-02 LAB
CULTURE RESULTS: ABNORMAL
GLUCOSE BLDC GLUCOMTR-MCNC: 142 MG/DL — HIGH (ref 70–99)
GLUCOSE BLDC GLUCOMTR-MCNC: 143 MG/DL — HIGH (ref 70–99)
GLUCOSE BLDC GLUCOMTR-MCNC: 159 MG/DL — HIGH (ref 70–99)
GLUCOSE BLDC GLUCOMTR-MCNC: 176 MG/DL — HIGH (ref 70–99)
HBV CORE AB SER-ACNC: SIGNIFICANT CHANGE UP
HBV SURFACE AB SER-ACNC: <3 MIU/ML — LOW
HBV SURFACE AG SER-ACNC: SIGNIFICANT CHANGE UP
HCV AB S/CO SERPL IA: 0.1 S/CO — SIGNIFICANT CHANGE UP (ref 0–0.99)
HCV AB SERPL-IMP: SIGNIFICANT CHANGE UP
SPECIMEN SOURCE: SIGNIFICANT CHANGE UP

## 2024-07-02 PROCEDURE — 99232 SBSQ HOSP IP/OBS MODERATE 35: CPT

## 2024-07-02 PROCEDURE — 99232 SBSQ HOSP IP/OBS MODERATE 35: CPT | Mod: GC

## 2024-07-02 RX ORDER — ERYTHROPOIETIN 4000 [IU]/ML
10000 INJECTION, SOLUTION INTRAVENOUS; SUBCUTANEOUS
Refills: 0 | Status: DISCONTINUED | OUTPATIENT
Start: 2024-07-02 | End: 2024-07-10

## 2024-07-02 RX ORDER — ERYTHROPOIETIN 4000 [IU]/ML
10000 INJECTION, SOLUTION INTRAVENOUS; SUBCUTANEOUS
Refills: 0 | Status: DISCONTINUED | OUTPATIENT
Start: 2024-07-02 | End: 2024-07-02

## 2024-07-02 RX ORDER — BENZONATATE 100 MG/1
100 TABLET ORAL
Refills: 0 | Status: DISCONTINUED | OUTPATIENT
Start: 2024-07-02 | End: 2024-07-10

## 2024-07-02 RX ADMIN — Medication 2.5 MILLIGRAM(S): at 08:19

## 2024-07-02 RX ADMIN — Medication 1 TABLET(S): at 12:24

## 2024-07-02 RX ADMIN — Medication 2.5 MILLIGRAM(S): at 03:46

## 2024-07-02 RX ADMIN — ATORVASTATIN CALCIUM 20 MILLIGRAM(S): 20 TABLET, FILM COATED ORAL at 21:35

## 2024-07-02 RX ADMIN — PIPERACILLIN SODIUM AND TAZOBACTAM SODIUM 25 GRAM(S): 3; .375 INJECTION, POWDER, LYOPHILIZED, FOR SOLUTION INTRAVENOUS at 05:46

## 2024-07-02 RX ADMIN — HYDRALAZINE HYDROCHLORIDE 100 MILLIGRAM(S): 50 TABLET ORAL at 05:48

## 2024-07-02 RX ADMIN — INSULIN LISPRO 1: 100 INJECTION, SOLUTION SUBCUTANEOUS at 08:06

## 2024-07-02 RX ADMIN — INSULIN GLARGINE 5 UNIT(S): 100 INJECTION, SOLUTION SUBCUTANEOUS at 08:05

## 2024-07-02 RX ADMIN — CARVEDILOL PHOSPHATE 25 MILLIGRAM(S): 80 CAPSULE, EXTENDED RELEASE ORAL at 17:09

## 2024-07-02 RX ADMIN — Medication 12.5 MILLIGRAM(S): at 21:34

## 2024-07-02 RX ADMIN — PANTOPRAZOLE SODIUM 40 MILLIGRAM(S): 40 INJECTION, POWDER, FOR SOLUTION INTRAVENOUS at 17:15

## 2024-07-02 RX ADMIN — APIXABAN 5 MILLIGRAM(S): 5 TABLET, FILM COATED ORAL at 17:09

## 2024-07-02 RX ADMIN — MENTHOL, METHYL SALICYLATE 1 APPLICATION(S): 63; 210 PATCH PERCUTANEOUS; TOPICAL; TRANSDERMAL at 12:34

## 2024-07-02 RX ADMIN — POLYETHYLENE GLYCOL 3350 17 GRAM(S): 1 POWDER ORAL at 12:24

## 2024-07-02 RX ADMIN — Medication 2 TABLET(S): at 21:34

## 2024-07-02 RX ADMIN — Medication 300 MILLIGRAM(S): at 12:24

## 2024-07-02 RX ADMIN — MODAFINIL 50 MILLIGRAM(S): 100 TABLET ORAL at 05:48

## 2024-07-02 RX ADMIN — HYDRALAZINE HYDROCHLORIDE 100 MILLIGRAM(S): 50 TABLET ORAL at 21:34

## 2024-07-02 RX ADMIN — APIXABAN 5 MILLIGRAM(S): 5 TABLET, FILM COATED ORAL at 05:48

## 2024-07-02 RX ADMIN — ASPIRIN 81 MILLIGRAM(S): 325 TABLET, FILM COATED ORAL at 12:24

## 2024-07-02 RX ADMIN — INSULIN LISPRO 1: 100 INJECTION, SOLUTION SUBCUTANEOUS at 12:24

## 2024-07-02 RX ADMIN — Medication 1 APPLICATION(S): at 05:49

## 2024-07-02 RX ADMIN — BENZONATATE 100 MILLIGRAM(S): 100 TABLET ORAL at 21:34

## 2024-07-02 RX ADMIN — Medication 2.5 MILLIGRAM(S): at 22:12

## 2024-07-02 RX ADMIN — HYDRALAZINE HYDROCHLORIDE 100 MILLIGRAM(S): 50 TABLET ORAL at 14:44

## 2024-07-02 RX ADMIN — Medication 2.5 MILLIGRAM(S): at 15:27

## 2024-07-02 RX ADMIN — CARVEDILOL PHOSPHATE 25 MILLIGRAM(S): 80 CAPSULE, EXTENDED RELEASE ORAL at 05:49

## 2024-07-02 RX ADMIN — PANTOPRAZOLE SODIUM 40 MILLIGRAM(S): 40 INJECTION, POWDER, FOR SOLUTION INTRAVENOUS at 05:49

## 2024-07-02 RX ADMIN — AMLODIPINE BESYLATE 10 MILLIGRAM(S): 2.5 TABLET ORAL at 05:49

## 2024-07-02 RX ADMIN — PIPERACILLIN SODIUM AND TAZOBACTAM SODIUM 25 GRAM(S): 3; .375 INJECTION, POWDER, LYOPHILIZED, FOR SOLUTION INTRAVENOUS at 17:02

## 2024-07-02 NOTE — PROGRESS NOTE ADULT - ASSESSMENT
73-year-old male w/ past medical history hypertension, ESRD (HD MWF)  insulin-dependent DM presented 4/29/24 to [facility] 4/29 with hiccups and demand ischemia, was treated with baclofen, developed what appears to be toxic encephalopathy. He then sustained a prolonged, complex hospital course over approx 6 weeks, notable for acute respiratory failure, intubation, sepsis, CRRT, DVT, GIB, trach and PEG placement, He is admitted for acute multidisciplinary rehab on 6/14/24 to [facility].     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  #Hospital Acquired Debility   #Dysphagia  #Gait, ADL, Functional impairments  - Comprehensive Multidisciplinary Rehab, PT/OT/ SLP 3 hr/day 5d/wk  - AC: Eliquis 5 BID for DVT, seen on CTA/P 5/12  - ASA81 qD  - Pain: PRN Tylenol, Lidocaine patch   - Absolute Avoidance of Baclofen   - NPO/Tube Feeds as below    #Acute Hypoxemic, Hypercapnic Respiratory Failure, s/p ETT intubation  #s/p tracheostomy   - Albuterol neb q6h   - Continue TC w/ ATC  increased secretions over weekend- started on Zosyn for elevated WBC 13K  Continue antibiotics x 5-7 days?- pulmonary to advise WBC now 10k  PMV on with trach- still with secretions-? cap  will start tessalon perle HS      #ESRD on HD  #Fistula stenosis   #Anemia   - Continue HD M/W/F per renal   - Access is RUE AVF  - Regular monitoring of electrolytes  - EPOGEN w/ HD  - Iron supplementation, 300 mg daily   HD yesterday    #HTN  - Coreg 25 q12h  - Hydralazine 100 mg TID  - Norvasc 10 mg/D  - Isordil would be next agent added  BPs Stable    #NSTEMI, Demand Ischemia, resolved  - Initially, troponins mildly elevated  - TTE (4/30): EF 72, normal, no regional wall motion abnormalities   - No need to pursue cath at this time   - 20 mg lipitor at bedtime     #IDDM2, A1c 6.9  - NPH 4u q6h  - FS, ISS TIDAC  Glucoses OK with coverage    #R Common Iliac DVT  - Initially treated with heparin but then developed questionable GIB, transitioned to Eliquis   - IR was consulted, no plan for IVC filter  - Continue Eliquis 5 mg BID     #GI  - Concern melena 5/27, resolved, transfused   - GI consulted, not candidate for endoscopy due to surgical risk  - Concern for upper GI Bleed, GI felt not true bleed  - Continue PPI BID   - MIralax 17g daily  - Senna 2 @ bedtime   - Ducolax suppository daily PRN   last BM 7/1    #Oropharyngeal Dysphagia  #s/p PEG (5/17)  - Failed green dye 6/1   - Continue feeds: Card Steady 10 cc/hr incr. q6 goal 45cc/hr - feeds changed to Bolus  MBS 6/20- cleared for minced moist with mild thicks  supplement PO with G feeds if needed  speech to reassess- ? advance diet      #Mood / Cognition:  - Neuropsych evaluation given prolonged and complex hospitalization  - Chart mentions concern for depression w/ possible suicidal ideation   no further hallucinations  took Seroquel last night- claims poor sleep-  provigil in AM only    #/Bladder:  - Check urinary status on arrival, possibly anuric   spont voiding    #Dysphagia:    - Diet: tube feeds/ minced moist with mild thicks  SP MBS  - Ongoing SLP assessment- speech to advance diet if able  - Nutrition to follow  OOB For ALL MEALS    #Skin/ Pressure Injury Prevention:  - Assessment on admission   - Incisions:    #Precautions/ Restrictions  - Falls, Aspiration Precautions   - COVID PCR:

## 2024-07-02 NOTE — PROGRESS NOTE ADULT - SUBJECTIVE AND OBJECTIVE BOX
73-year-old male w/ past medical history hypertension, ESRD (HD MWF)  insulin-dependent DM presented 4/29/24 to [facility] with hiccups, chest pain of several days duration, admitted for concern demand ischemia. Early on, he was given baclofen for his hiccups, but soon developed AMS with 2 RRTs by 5/2/24, prompting intubation, upgrade to MICU. There was concern that baclofen toxicity was the culprit of his then-encephalopathic state, as patient had also missed a HD session due to a clotted fistula.     In coming days, he was treated empirically with 5 days of zosyn, had a negative CTH and LP, and had an EEG that did not capture a seizure but showed diffuse non-specific cerebral dysfunction - c/w toxic-metabolic encephalopathies. On 5/6, an MRI showed that the patient had sustained R pontine and cerebellar stroke. He was extubated on 5/9, but owing to a challenging extubation became septic w/ B Cereus, treated with Vanc. He had trach placed 5/14, PEG 5/17, then downgraded.     On the floors, he was followed by several services, including Neurology, who mention the strokes were embolic appearing. Subsequent hospital course notable for nonocclusive R common iliac DVT, for which he was started on a heparin drip which was stopped when patient developed an upper GI Bleed, though GI was consulted and felt he did not have an overt bleed. His dialysis fistula site was stenotic, s/p fistulogram, ultimately changed to a RUE AVF 6/5. He was ultimately started on Eliquis 6/7 for the DVT. Medically stable, he is admitted to [facility] on 6/14/24.     ROS/subjective:  patient seen and evaluated bedside  reports poor sleep secondary to coughing  more animated/ cooperative  secretions persist- Clear- with PMV/trach collar  transferred sit to stand from  modA x2  seen outside on patio for therapy  last BM 7/1  no dizziness, no headaches, no nausea, no vomiting, no SOB, no chest pain      MEDICATIONS  (STANDING):  albuterol    0.083% 2.5 milliGRAM(s) Nebulizer every 6 hours  amLODIPine   Tablet 10 milliGRAM(s) Oral daily  apixaban 5 milliGRAM(s) Enteral Tube two times a day  aspirin  chewable 81 milliGRAM(s) Oral daily  atorvastatin 20 milliGRAM(s) Oral at bedtime  carvedilol 25 milliGRAM(s) Oral every 12 hours  chlorhexidine 2% Cloths 1 Application(s) Topical <User Schedule>  dextrose 10% Bolus 125 milliLiter(s) IV Bolus once  dextrose 5%. 1000 milliLiter(s) (50 mL/Hr) IV Continuous <Continuous>  dextrose 5%. 1000 milliLiter(s) (100 mL/Hr) IV Continuous <Continuous>  dextrose 50% Injectable 12.5 Gram(s) IV Push once  dextrose 50% Injectable 25 Gram(s) IV Push once  epoetin reilly-epbx (RETACRIT) Injectable 00484 Unit(s) IV Push <User Schedule>  ferrous    sulfate Liquid 300 milliGRAM(s) Enteral Tube daily  glucagon  Injectable 1 milliGRAM(s) IntraMuscular once  hydrALAZINE 100 milliGRAM(s) Oral three times a day  insulin glargine Injectable (LANTUS) 5 Unit(s) SubCutaneous every morning  insulin lispro (ADMELOG) corrective regimen sliding scale   SubCutaneous three times a day before meals  lidocaine   4% Patch 1 Patch Transdermal every 24 hours  lidocaine   4% Patch 1 Patch Transdermal every 24 hours  methyl salicylate 15%/menthol 10% Topical Cream 1 Application(s) Topical <User Schedule>  modafinil 50 milliGRAM(s) Oral <User Schedule>  Nephro-noam 1 Tablet(s) Oral daily  pantoprazole   Suspension 40 milliGRAM(s) Enteral Tube two times a day  piperacillin/tazobactam IVPB.. 3.375 Gram(s) IV Intermittent every 12 hours  polyethylene glycol 3350 17 Gram(s) Oral daily  QUEtiapine 12.5 milliGRAM(s) Oral at bedtime  senna 2 Tablet(s) Oral at bedtime    MEDICATIONS  (PRN):  acetaminophen   Oral Liquid .. 650 milliGRAM(s) Oral every 6 hours PRN Moderate Pain (4 - 6)  bisacodyl Suppository 10 milliGRAM(s) Rectal daily PRN Constipation  dextrose Oral Gel 15 Gram(s) Oral once PRN Blood Glucose LESS THAN 70 milliGRAM(s)/deciliter  dextrose Oral Gel 15 Gram(s) Oral once PRN Blood Glucose LESS THAN 70 milliGRAM(s)/deciliter  melatonin 3 milliGRAM(s) Oral at bedtime PRN Insomnia                            8.4    10.77 )-----------( 245      ( 01 Jul 2024 16:00 )             25.8     07-01    129<L>  |  90<L>  |  65<H>  ----------------------------<  154<H>  4.4   |  27  |  6.56<H>    Ca    8.8      01 Jul 2024 16:00  Phos  3.9     07-01      Urinalysis Basic - ( 01 Jul 2024 16:00 )    Color: x / Appearance: x / SG: x / pH: x  Gluc: 154 mg/dL / Ketone: x  / Bili: x / Urobili: x   Blood: x / Protein: x / Nitrite: x   Leuk Esterase: x / RBC: x / WBC x   Sq Epi: x / Non Sq Epi: x / Bacteria: x        CAPILLARY BLOOD GLUCOSE      POCT Blood Glucose.: 176 mg/dL (02 Jul 2024 12:12)  POCT Blood Glucose.: 159 mg/dL (02 Jul 2024 07:45)  POCT Blood Glucose.: 110 mg/dL (01 Jul 2024 23:21)      Vital Signs Last 24 Hrs  T(C): 36.7 (02 Jul 2024 08:59), Max: 36.7 (02 Jul 2024 08:59)  T(F): 98 (02 Jul 2024 08:59), Max: 98 (02 Jul 2024 08:59)  HR: 60 (02 Jul 2024 08:59) (50 - 60)  BP: 126/68 (02 Jul 2024 08:59) (126/68 - 136/67)  BP(mean): --  RR: 16 (02 Jul 2024 08:59) (15 - 18)  SpO2: 99% (02 Jul 2024 08:59) (97% - 100%)    Parameters below as of 02 Jul 2024 08:59  Patient On (Oxygen Delivery Method): tracheostomy collar    Constitutional - NAD, Comfortable with trach collar/PMV  HEENT - NCAT,   Neck - Supple, No limited ROM  Chest - good chest expansion, good respiratory effort coarse BS  Cardio - warm and well perfused,   Abdomen -  Soft, NTND. + peg.   Extremities - No peripheral edema, No calf tenderness. RUE fistula.   Neurologic Exam:                    Cognitive -      Cranial Nerves - No facial asymmetry, Tongue midline, Shoulder shrug intact no facial droop     Motor -                      LEFT    UE - ShAB 4/5, EF 4/5, EE 3/5, WE 4/5,  WNL                    RIGHT UE - ShAB 4/5, EF 4/5, EE 3/5, WE 4/5,  WNL                    LEFT    LE - HF 3/5, KE 4/5, DF 4/5, PF 4/5                    RIGHT LE - HF 3/5, KE 4/5, DF 4/5, PF 4/5        Sensory - Intact to LT bilateral in face, arm and legs.      Reflexes - DTR 1 / 4 biceps symmetric. neg Latham's b/l, No clonus.  Psychiatric - Mood stable, Affect WNL  Skin on admission: no sacral pressure injuries.      IDT in [facility] 6/26  Tentative DC 7/9 to home- ? KIMI

## 2024-07-02 NOTE — PROGRESS NOTE ADULT - SUBJECTIVE AND OBJECTIVE BOX
Follow-up Pulmonary Progress Note  Chief Complaint : Cerebral infarction      patient feeling well  no cp, sob, palp  tolerating PMV valve  hypoxia and secretiosn less      Allergies :No Known Allergies      PAST MEDICAL & SURGICAL HISTORY:  Diabetes    Benign essential HTN    HLD (hyperlipidemia)    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    ESRD on hemodialysis    Arteriovenous fistula        Medications:  MEDICATIONS  (STANDING):  albuterol    0.083% 2.5 milliGRAM(s) Nebulizer every 6 hours  amLODIPine   Tablet 10 milliGRAM(s) Oral daily  apixaban 5 milliGRAM(s) Enteral Tube two times a day  aspirin  chewable 81 milliGRAM(s) Oral daily  atorvastatin 20 milliGRAM(s) Oral at bedtime  benzonatate 100 milliGRAM(s) Oral <User Schedule>  carvedilol 25 milliGRAM(s) Oral every 12 hours  chlorhexidine 2% Cloths 1 Application(s) Topical <User Schedule>  dextrose 10% Bolus 125 milliLiter(s) IV Bolus once  dextrose 5%. 1000 milliLiter(s) (100 mL/Hr) IV Continuous <Continuous>  dextrose 5%. 1000 milliLiter(s) (50 mL/Hr) IV Continuous <Continuous>  dextrose 50% Injectable 25 Gram(s) IV Push once  dextrose 50% Injectable 12.5 Gram(s) IV Push once  epoetin reilly-epbx (RETACRIT) Injectable 38055 Unit(s) IV Push <User Schedule>  ferrous    sulfate Liquid 300 milliGRAM(s) Enteral Tube daily  glucagon  Injectable 1 milliGRAM(s) IntraMuscular once  hydrALAZINE 100 milliGRAM(s) Oral three times a day  insulin glargine Injectable (LANTUS) 5 Unit(s) SubCutaneous every morning  insulin lispro (ADMELOG) corrective regimen sliding scale   SubCutaneous three times a day before meals  lidocaine   4% Patch 1 Patch Transdermal every 24 hours  lidocaine   4% Patch 1 Patch Transdermal every 24 hours  methyl salicylate 15%/menthol 10% Topical Cream 1 Application(s) Topical <User Schedule>  modafinil 50 milliGRAM(s) Oral <User Schedule>  Nephro-noam 1 Tablet(s) Oral daily  pantoprazole   Suspension 40 milliGRAM(s) Enteral Tube two times a day  piperacillin/tazobactam IVPB.. 3.375 Gram(s) IV Intermittent every 12 hours  polyethylene glycol 3350 17 Gram(s) Oral daily  QUEtiapine 12.5 milliGRAM(s) Oral at bedtime  senna 2 Tablet(s) Oral at bedtime    MEDICATIONS  (PRN):  acetaminophen   Oral Liquid .. 650 milliGRAM(s) Oral every 6 hours PRN Moderate Pain (4 - 6)  bisacodyl Suppository 10 milliGRAM(s) Rectal daily PRN Constipation  dextrose Oral Gel 15 Gram(s) Oral once PRN Blood Glucose LESS THAN 70 milliGRAM(s)/deciliter  dextrose Oral Gel 15 Gram(s) Oral once PRN Blood Glucose LESS THAN 70 milliGRAM(s)/deciliter  melatonin 3 milliGRAM(s) Oral at bedtime PRN Insomnia      Antibiotics History  piperacillin/tazobactam IVPB.. 3.375 Gram(s) IV Intermittent every 12 hours, 06-29-24 @ 22:03, Stop order after: 7 Days      Heme Medications   apixaban 5 milliGRAM(s) Enteral Tube two times a day, 06-14-24 @ 18:29  aspirin  chewable 81 milliGRAM(s) Oral daily, 06-15-24 @ 00:00      GI Medications  bisacodyl Suppository 10 milliGRAM(s) Rectal daily, 06-14-24 @ 18:30, Routine PRN  pantoprazole   Suspension 40 milliGRAM(s) Enteral Tube two times a day, 06-14-24 @ 21:14, Routine  polyethylene glycol 3350 17 Gram(s) Oral daily, 06-15-24 @ 00:00, Routine  senna 2 Tablet(s) Oral at bedtime, 06-14-24 @ 18:29, Routine        LABS:                        8.4    10.77 )-----------( 245      ( 01 Jul 2024 16:00 )             25.8     07-01    129<L>  |  90<L>  |  65<H>  ----------------------------<  154<H>  4.4   |  27  |  6.56<H>    Ca    8.8      01 Jul 2024 16:00  Phos  3.9     07-01                Urinalysis Basic - ( 01 Jul 2024 16:00 )    Color: x / Appearance: x / SG: x / pH: x  Gluc: 154 mg/dL / Ketone: x  / Bili: x / Urobili: x   Blood: x / Protein: x / Nitrite: x   Leuk Esterase: x / RBC: x / WBC x   Sq Epi: x / Non Sq Epi: x / Bacteria: x      Procalcitonin: 12.20 ng/mL (06-30-24 @ 00:00)          CULTURES: (if applicable)    Culture - Sputum (collected 06-30-24 @ 09:00)  Source: .Sputum Sputum  Gram Stain (06-30-24 @ 23:39):    Numerous polymorphonuclear leukocytes per low power field    Few Squamous epithelial cells per low power field    Few Gram Negative Rods per oil power field    Few Gram positive cocci in pairs per oil power field  Final Report (07-02-24 @ 14:20):    Normal Respiratory Jocelyn present    Culture - Blood (collected 06-30-24 @ 00:00)  Source: .Blood Blood-Peripheral  Preliminary Report (07-02-24 @ 11:01):    No growth at 48 Hours    Culture - Blood (collected 06-30-24 @ 00:00)  Source: .Blood Blood-Peripheral  Preliminary Report (07-02-24 @ 11:01):    No growth at 48 Hours      Rapid RVP Result: NotDetec (06-29-24 @ 22:30)       VITALS:  T(C): 36.7 (07-02-24 @ 08:59), Max: 36.7 (07-02-24 @ 08:59)  T(F): 98 (07-02-24 @ 08:59), Max: 98 (07-02-24 @ 08:59)  HR: 60 (07-02-24 @ 08:59) (50 - 60)  BP: 126/68 (07-02-24 @ 08:59) (126/68 - 136/67)  BP(mean): --  ABP: --  ABP(mean): --  RR: 16 (07-02-24 @ 08:59) (15 - 18)  SpO2: 99% (07-02-24 @ 08:59) (97% - 100%)  CVP(mm Hg): --  CVP(cm H2O): --    Ins and Outs     07-01-24 @ 07:01  -  07-02-24 @ 07:00  --------------------------------------------------------  IN: 714 mL / OUT: 1500 mL / NET: -786 mL                I&O's Detail    01 Jul 2024 07:01  -  02 Jul 2024 07:00  --------------------------------------------------------  IN:    Free Water: 240 mL    Nepro: 474 mL  Total IN: 714 mL    OUT:    Other (mL): 1500 mL  Total OUT: 1500 mL    Total NET: -786 mL

## 2024-07-02 NOTE — PROGRESS NOTE ADULT - ASSESSMENT
73M HTN, ESRD (HD MWF via AVG) DM who was admitted for unstable angina further c/b altered mental status requiring intubation with CRRT requriment, acute CVA vs baclofen toxicity, DVT and GIB s/p PEG/trach. Notable course for AV Fistula dysfunction requiring balloon angioplasty.    #Recent AMS suspect due to baclofen toxicity vs CVA  #Acute CVA  - MRI head 5/6 showed acute infarct in left talya and left cerebellum   - continue holding baclofen  - continue ASA and statin     #DM2   - hypoglycemia on 6/23 resolved   - A1c 6.9  - now with intermittent hyperglycemia. will c/w Lantus 5unit qAM  - continue ISS    #Acute Hypoxemic, Hypercapnic Respiratory Failure, s/p ETT intubation  #increased secretions, sialorrhea  #Aspiration PNA?  -recent low grade fever, leukocytosis, elevated procal, CXR with left sided opacity now on zosyn empirically Day 3/5  - scopolamine patch  - s/p tracheostomy   - Albuterol neb q6h   - pulm on board  - oral suction, minimize trach suctioning     #ESRD on HD MWF via RT AFF  - stable, continue HD    #Hallucination  - Seroquel 12.5mg HS  - Psych following    #HTN  - Coreg 25 q12h  - Hydralazine 100 mg TID  - Norvasc 10 mg/D  - BP acceptable     #R Common Iliac DVT  - CT AP (5/12) with nonocclusive RIGHT common iliac vein deep venous thrombosis  - Continue Eliquis 5 mg BID     #Oropharyngeal Dysphagia  - s/p PEG (5/17)  - Continue PO diet and supplement with TF if po intake is poor    DVT PPX: Eliquis

## 2024-07-02 NOTE — PROGRESS NOTE ADULT - SUBJECTIVE AND OBJECTIVE BOX
No distress    Vital Signs Last 24 Hrs  T(C): 36.7 (07-02-24 @ 08:59), Max: 36.7 (07-02-24 @ 08:59)  T(F): 98 (07-02-24 @ 08:59), Max: 98 (07-02-24 @ 08:59)  HR: 58 (07-02-24 @ 16:55) (54 - 60)  BP: 144/64 (07-02-24 @ 16:55) (126/68 - 144/64)  RR: 16 (07-02-24 @ 08:59) (15 - 16)  SpO2: 99% (07-02-24 @ 08:59) (97% - 99%)    I&O's Detail    01 Jul 2024 07:01  -  02 Jul 2024 07:00  --------------------------------------------------------  IN:    Free Water: 240 mL    Nepro: 474 mL  Total IN: 714 mL    OUT:    Other (mL): 1500 mL  Total OUT: 1500 mL    Lungs b/l air entry  Heart S1S2  Abd soft ND  Extremities: tr edema  RUE avf                                                                             8.4    10.77 )-----------( 245      ( 01 Jul 2024 16:00 )             25.8     01 Jul 2024 16:00    129    |  90     |  65     ----------------------------<  154    4.4     |  27     |  6.56     Ca    8.8        01 Jul 2024 16:00  Phos  3.9       01 Jul 2024 16:00    Culture - Sputum (collected 30 Jun 2024 09:00)  Source: .Sputum Sputum  Gram Stain (30 Jun 2024 23:39):    Numerous polymorphonuclear leukocytes per low power field    Few Squamous epithelial cells per low power field    Few Gram Negative Rods per oil power field    Few Gram positive cocci in pairs per oil power field  Final Report (02 Jul 2024 14:20):    Normal Respiratory Jocelyn present    Culture - Blood (collected 30 Jun 2024 00:00)  Source: .Blood Blood-Peripheral  Preliminary Report (02 Jul 2024 11:01):    No growth at 48 Hours    Culture - Blood (collected 30 Jun 2024 00:00)  Source: .Blood Blood-Peripheral  Preliminary Report (02 Jul 2024 11:01):    No growth at 48 Hours    acetaminophen   Oral Liquid .. 650 milliGRAM(s) Oral every 6 hours PRN  albuterol    0.083% 2.5 milliGRAM(s) Nebulizer every 6 hours  amLODIPine   Tablet 10 milliGRAM(s) Oral daily  apixaban 5 milliGRAM(s) Enteral Tube two times a day  aspirin  chewable 81 milliGRAM(s) Oral daily  atorvastatin 20 milliGRAM(s) Oral at bedtime  benzonatate 100 milliGRAM(s) Oral <User Schedule>  bisacodyl Suppository 10 milliGRAM(s) Rectal daily PRN  carvedilol 25 milliGRAM(s) Oral every 12 hours  chlorhexidine 2% Cloths 1 Application(s) Topical <User Schedule>  dextrose 10% Bolus 125 milliLiter(s) IV Bolus once  dextrose 5%. 1000 milliLiter(s) IV Continuous <Continuous>  dextrose 5%. 1000 milliLiter(s) IV Continuous <Continuous>  dextrose 50% Injectable 12.5 Gram(s) IV Push once  dextrose 50% Injectable 25 Gram(s) IV Push once  dextrose Oral Gel 15 Gram(s) Oral once PRN  dextrose Oral Gel 15 Gram(s) Oral once PRN  epoetin reilly-epbx (RETACRIT) Injectable 86277 Unit(s) IV Push <User Schedule>  ferrous    sulfate Liquid 300 milliGRAM(s) Enteral Tube daily  glucagon  Injectable 1 milliGRAM(s) IntraMuscular once  hydrALAZINE 100 milliGRAM(s) Oral three times a day  insulin glargine Injectable (LANTUS) 5 Unit(s) SubCutaneous every morning  insulin lispro (ADMELOG) corrective regimen sliding scale   SubCutaneous three times a day before meals  lidocaine   4% Patch 1 Patch Transdermal every 24 hours  lidocaine   4% Patch 1 Patch Transdermal every 24 hours  melatonin 3 milliGRAM(s) Oral at bedtime PRN  methyl salicylate 15%/menthol 10% Topical Cream 1 Application(s) Topical <User Schedule>  modafinil 50 milliGRAM(s) Oral <User Schedule>  Nephro-noam 1 Tablet(s) Oral daily  pantoprazole   Suspension 40 milliGRAM(s) Enteral Tube two times a day  piperacillin/tazobactam IVPB.. 3.375 Gram(s) IV Intermittent every 12 hours  polyethylene glycol 3350 17 Gram(s) Oral daily  QUEtiapine 12.5 milliGRAM(s) Oral at bedtime  senna 2 Tablet(s) Oral at bedtime    A/P:    ESRD on HD  S/p complicated hospital course as per HPI  S/p CVA, trach, PEG  HD MWF  TMP as able   Epoetin w/HD 3 x week  Bld work w/each HD   Rehab    [phone]

## 2024-07-02 NOTE — PROGRESS NOTE ADULT - SUBJECTIVE AND OBJECTIVE BOX
Patient is a 73y old  Male who presents with a chief complaint of CVA     Nursing noted increased secretions  Also coughing while eating      Patient seen and examined at bedside.    ALLERGIES:  No Known Allergies    MEDICATIONS  (STANDING):  albuterol    0.083% 2.5 milliGRAM(s) Nebulizer every 6 hours  amLODIPine   Tablet 10 milliGRAM(s) Oral daily  apixaban 5 milliGRAM(s) Enteral Tube two times a day  aspirin  chewable 81 milliGRAM(s) Oral daily  atorvastatin 20 milliGRAM(s) Oral at bedtime  carvedilol 25 milliGRAM(s) Oral every 12 hours  chlorhexidine 2% Cloths 1 Application(s) Topical <User Schedule>  dextrose 10% Bolus 125 milliLiter(s) IV Bolus once  dextrose 5%. 1000 milliLiter(s) (50 mL/Hr) IV Continuous <Continuous>  dextrose 5%. 1000 milliLiter(s) (100 mL/Hr) IV Continuous <Continuous>  dextrose 50% Injectable 25 Gram(s) IV Push once  dextrose 50% Injectable 12.5 Gram(s) IV Push once  epoetin reilly-epbx (RETACRIT) Injectable 05278 Unit(s) IV Push <User Schedule>  ferrous    sulfate Liquid 300 milliGRAM(s) Enteral Tube daily  glucagon  Injectable 1 milliGRAM(s) IntraMuscular once  hydrALAZINE 100 milliGRAM(s) Oral three times a day  insulin glargine Injectable (LANTUS) 5 Unit(s) SubCutaneous every morning  insulin lispro (ADMELOG) corrective regimen sliding scale   SubCutaneous three times a day before meals  lidocaine   4% Patch 1 Patch Transdermal every 24 hours  lidocaine   4% Patch 1 Patch Transdermal every 24 hours  methyl salicylate 15%/menthol 10% Topical Cream 1 Application(s) Topical <User Schedule>  modafinil 50 milliGRAM(s) Oral <User Schedule>  Nephro-noam 1 Tablet(s) Oral daily  pantoprazole   Suspension 40 milliGRAM(s) Enteral Tube two times a day  piperacillin/tazobactam IVPB.. 3.375 Gram(s) IV Intermittent every 12 hours  polyethylene glycol 3350 17 Gram(s) Oral daily  QUEtiapine 12.5 milliGRAM(s) Oral at bedtime  senna 2 Tablet(s) Oral at bedtime    MEDICATIONS  (PRN):  acetaminophen   Oral Liquid .. 650 milliGRAM(s) Oral every 6 hours PRN Moderate Pain (4 - 6)  bisacodyl Suppository 10 milliGRAM(s) Rectal daily PRN Constipation  dextrose Oral Gel 15 Gram(s) Oral once PRN Blood Glucose LESS THAN 70 milliGRAM(s)/deciliter  dextrose Oral Gel 15 Gram(s) Oral once PRN Blood Glucose LESS THAN 70 milliGRAM(s)/deciliter  melatonin 3 milliGRAM(s) Oral at bedtime PRN Insomnia    Vital Signs Last 24 Hrs  T(F): 98 (02 Jul 2024 08:59), Max: 98 (02 Jul 2024 08:59)  HR: 60 (02 Jul 2024 08:59) (50 - 60)  BP: 126/68 (02 Jul 2024 08:59) (114/63 - 136/67)  RR: 16 (02 Jul 2024 08:59) (15 - 18)  SpO2: 99% (02 Jul 2024 08:59) (97% - 100%)  I&O's Summary    01 Jul 2024 07:01  -  02 Jul 2024 07:00  --------------------------------------------------------  IN: 714 mL / OUT: 1500 mL / NET: -786 mL        PHYSICAL EXAM:  General: NAD, A/O   Trach noted; canister with pale colored secretions  ENT: MMM, no scleral icterus  Neck: Supple, No JVD, no thyroidomegaly  Lungs: Clear to auscultation bilaterally, no wheezes, no rales, no rhonchi, good inspiratory effort  Cardio: RRR, S1/S2, No murmurs  Abdomen: Soft, Nontender, Nondistended; Bowel sounds present  Extremities: No calf tenderness, No pitting edema, no skin changes    LABS:                        8.4    10.77 )-----------( 245      ( 01 Jul 2024 16:00 )             25.8       07-01    129  |  90  |  65  ----------------------------<  154  4.4   |  27  |  6.56    Ca    8.8      01 Jul 2024 16:00  Phos  3.9     07-01      Lactate, Blood: 1.4 mmol/L (06-30 @ 00:00)    05-17 Chol 86 mg/dL LDL -- HDL 27 mg/dL Trig 151 mg/dL    POCT Blood Glucose.: 176 mg/dL (02 Jul 2024 12:12)  POCT Blood Glucose.: 159 mg/dL (02 Jul 2024 07:45)  POCT Blood Glucose.: 110 mg/dL (01 Jul 2024 23:21)      Urinalysis Basic - ( 01 Jul 2024 16:00 )    Color: x / Appearance: x / SG: x / pH: x  Gluc: 154 mg/dL / Ketone: x  / Bili: x / Urobili: x   Blood: x / Protein: x / Nitrite: x   Leuk Esterase: x / RBC: x / WBC x   Sq Epi: x / Non Sq Epi: x / Bacteria: x    Culture - Sputum (collected 30 Jun 2024 09:00)  Source: .Sputum Sputum  Gram Stain (30 Jun 2024 23:39):    Numerous polymorphonuclear leukocytes per low power field    Few Squamous epithelial cells per low power field    Few Gram Negative Rods per oil power field    Few Gram positive cocci in pairs per oil power field  Preliminary Report (01 Jul 2024 12:01):    Normal Respiratory Jocelyn present    Culture - Blood (collected 30 Jun 2024 00:00)  Source: .Blood Blood-Peripheral  Preliminary Report (02 Jul 2024 11:01):    No growth at 48 Hours    Culture - Blood (collected 30 Jun 2024 00:00)  Source: .Blood Blood-Peripheral  Preliminary Report (02 Jul 2024 11:01):    No growth at 48 Hours    COVID-19 PCR: NotDetec (06-14-24 @ 18:56)  COVID-19 PCR: NotDetec (06-14-24 @ 18:56)  COVID-19 PCR: NotDetec (01-19-24 @ 15:09)  COVID-19 PCR: NotDetec (08-31-22 @ 11:09)

## 2024-07-02 NOTE — PROGRESS NOTE ADULT - ASSESSMENT
Physical Exam   GENERAL:               Alert,  No acute distress.    HEENT:                   No JVD, Dry MM + trach with no secretion   PULM:                     Bilateral air entry, diminished to auscultation bilaterally, no significant sputum production, No Rales, No Rhonchi, No Wheezing  CVS:                         S1, S2,  No Murmur  ABD:                        Soft, nondistended, nontender, normoactive bowel sounds, + PEG  EXT:                         No edema, nontender, No Cyanosis or Clubbing    NEURO:                  Alert,  interactive,  follows commands  PSYC:                      Calm,      Assessment  1. Chronic Respiratory failure s/p Trach  2. AMS appears to have improved , ? Baclofen toxicity vs CVA  3. ESRD on HD, DM 2,   4. Right iliac DVT on Eliquis      Plan   Finish 5-7 Day course of antibiotics  At this point if tolerating would recommend to restart capping trials to work towards decanulation  repeat cxr in am       trach care  o2 Viat TC to keep humidity and sat > 92%  PMV with therapies and day time hours appears to be tolerating     continue Eliquis for DVT   will follow

## 2024-07-03 LAB
ANION GAP SERPL CALC-SCNC: 9 MMOL/L — SIGNIFICANT CHANGE UP (ref 5–17)
BUN SERPL-MCNC: 38 MG/DL — HIGH (ref 7–23)
CALCIUM SERPL-MCNC: 8.6 MG/DL — SIGNIFICANT CHANGE UP (ref 8.4–10.5)
CHLORIDE SERPL-SCNC: 97 MMOL/L — SIGNIFICANT CHANGE UP (ref 96–108)
CO2 SERPL-SCNC: 30 MMOL/L — SIGNIFICANT CHANGE UP (ref 22–31)
CREAT SERPL-MCNC: 5.97 MG/DL — HIGH (ref 0.5–1.3)
EGFR: 9 ML/MIN/1.73M2 — LOW
GLUCOSE BLDC GLUCOMTR-MCNC: 159 MG/DL — HIGH (ref 70–99)
GLUCOSE BLDC GLUCOMTR-MCNC: 163 MG/DL — HIGH (ref 70–99)
GLUCOSE BLDC GLUCOMTR-MCNC: 201 MG/DL — HIGH (ref 70–99)
GLUCOSE SERPL-MCNC: 187 MG/DL — HIGH (ref 70–99)
HCT VFR BLD CALC: 28.1 % — LOW (ref 39–50)
HGB BLD-MCNC: 8.9 G/DL — LOW (ref 13–17)
MCHC RBC-ENTMCNC: 22.4 PG — LOW (ref 27–34)
MCHC RBC-ENTMCNC: 31.7 GM/DL — LOW (ref 32–36)
MCV RBC AUTO: 70.6 FL — LOW (ref 80–100)
NRBC # BLD: 0 /100 WBCS — SIGNIFICANT CHANGE UP (ref 0–0)
PHOSPHATE SERPL-MCNC: 4.4 MG/DL — SIGNIFICANT CHANGE UP (ref 2.5–4.5)
PLATELET # BLD AUTO: 274 K/UL — SIGNIFICANT CHANGE UP (ref 150–400)
POTASSIUM SERPL-MCNC: 3.7 MMOL/L — SIGNIFICANT CHANGE UP (ref 3.5–5.3)
POTASSIUM SERPL-SCNC: 3.7 MMOL/L — SIGNIFICANT CHANGE UP (ref 3.5–5.3)
RBC # BLD: 3.98 M/UL — LOW (ref 4.2–5.8)
RBC # FLD: 20.3 % — HIGH (ref 10.3–14.5)
SODIUM SERPL-SCNC: 136 MMOL/L — SIGNIFICANT CHANGE UP (ref 135–145)
WBC # BLD: 8.5 K/UL — SIGNIFICANT CHANGE UP (ref 3.8–10.5)
WBC # FLD AUTO: 8.5 K/UL — SIGNIFICANT CHANGE UP (ref 3.8–10.5)

## 2024-07-03 PROCEDURE — 99233 SBSQ HOSP IP/OBS HIGH 50: CPT | Mod: GC

## 2024-07-03 PROCEDURE — 99232 SBSQ HOSP IP/OBS MODERATE 35: CPT

## 2024-07-03 PROCEDURE — 71045 X-RAY EXAM CHEST 1 VIEW: CPT | Mod: 26

## 2024-07-03 RX ORDER — MODAFINIL 100 MG/1
50 TABLET ORAL
Refills: 0 | Status: DISCONTINUED | OUTPATIENT
Start: 2024-07-04 | End: 2024-07-10

## 2024-07-03 RX ADMIN — PIPERACILLIN SODIUM AND TAZOBACTAM SODIUM 25 GRAM(S): 3; .375 INJECTION, POWDER, LYOPHILIZED, FOR SOLUTION INTRAVENOUS at 05:39

## 2024-07-03 RX ADMIN — HYDRALAZINE HYDROCHLORIDE 100 MILLIGRAM(S): 50 TABLET ORAL at 13:10

## 2024-07-03 RX ADMIN — ATORVASTATIN CALCIUM 20 MILLIGRAM(S): 20 TABLET, FILM COATED ORAL at 21:55

## 2024-07-03 RX ADMIN — INSULIN GLARGINE 5 UNIT(S): 100 INJECTION, SOLUTION SUBCUTANEOUS at 08:09

## 2024-07-03 RX ADMIN — CARVEDILOL PHOSPHATE 25 MILLIGRAM(S): 80 CAPSULE, EXTENDED RELEASE ORAL at 05:40

## 2024-07-03 RX ADMIN — Medication 1 APPLICATION(S): at 05:43

## 2024-07-03 RX ADMIN — AMLODIPINE BESYLATE 10 MILLIGRAM(S): 2.5 TABLET ORAL at 05:40

## 2024-07-03 RX ADMIN — INSULIN LISPRO 2: 100 INJECTION, SOLUTION SUBCUTANEOUS at 11:55

## 2024-07-03 RX ADMIN — INSULIN LISPRO 1: 100 INJECTION, SOLUTION SUBCUTANEOUS at 17:42

## 2024-07-03 RX ADMIN — APIXABAN 5 MILLIGRAM(S): 5 TABLET, FILM COATED ORAL at 17:40

## 2024-07-03 RX ADMIN — INSULIN LISPRO 1: 100 INJECTION, SOLUTION SUBCUTANEOUS at 08:08

## 2024-07-03 RX ADMIN — Medication 12.5 MILLIGRAM(S): at 21:56

## 2024-07-03 RX ADMIN — PIPERACILLIN SODIUM AND TAZOBACTAM SODIUM 25 GRAM(S): 3; .375 INJECTION, POWDER, LYOPHILIZED, FOR SOLUTION INTRAVENOUS at 17:36

## 2024-07-03 RX ADMIN — ASPIRIN 81 MILLIGRAM(S): 325 TABLET, FILM COATED ORAL at 11:53

## 2024-07-03 RX ADMIN — PANTOPRAZOLE SODIUM 40 MILLIGRAM(S): 40 INJECTION, POWDER, FOR SOLUTION INTRAVENOUS at 05:41

## 2024-07-03 RX ADMIN — HYDRALAZINE HYDROCHLORIDE 100 MILLIGRAM(S): 50 TABLET ORAL at 21:52

## 2024-07-03 RX ADMIN — APIXABAN 5 MILLIGRAM(S): 5 TABLET, FILM COATED ORAL at 05:40

## 2024-07-03 RX ADMIN — Medication 2.5 MILLIGRAM(S): at 08:21

## 2024-07-03 RX ADMIN — HYDRALAZINE HYDROCHLORIDE 100 MILLIGRAM(S): 50 TABLET ORAL at 05:40

## 2024-07-03 RX ADMIN — Medication 300 MILLIGRAM(S): at 11:53

## 2024-07-03 RX ADMIN — MODAFINIL 50 MILLIGRAM(S): 100 TABLET ORAL at 05:40

## 2024-07-03 RX ADMIN — Medication 2.5 MILLIGRAM(S): at 21:42

## 2024-07-03 RX ADMIN — Medication 3 MILLIGRAM(S): at 21:55

## 2024-07-03 RX ADMIN — Medication 2.5 MILLIGRAM(S): at 05:06

## 2024-07-03 RX ADMIN — Medication 2 TABLET(S): at 21:55

## 2024-07-03 RX ADMIN — PANTOPRAZOLE SODIUM 40 MILLIGRAM(S): 40 INJECTION, POWDER, FOR SOLUTION INTRAVENOUS at 17:43

## 2024-07-03 RX ADMIN — ERYTHROPOIETIN 10000 UNIT(S): 4000 INJECTION, SOLUTION INTRAVENOUS; SUBCUTANEOUS at 13:50

## 2024-07-03 RX ADMIN — MENTHOL, METHYL SALICYLATE 1 APPLICATION(S): 63; 210 PATCH PERCUTANEOUS; TOPICAL; TRANSDERMAL at 13:29

## 2024-07-03 RX ADMIN — BENZONATATE 100 MILLIGRAM(S): 100 TABLET ORAL at 21:56

## 2024-07-03 RX ADMIN — Medication 1 TABLET(S): at 11:53

## 2024-07-03 NOTE — PROGRESS NOTE ADULT - ASSESSMENT
73-year-old male w/ past medical history hypertension, ESRD (HD MWF)  insulin-dependent DM presented 4/29/24 to [facility] 4/29 with hiccups and demand ischemia, was treated with baclofen, developed what appears to be toxic encephalopathy. He then sustained a prolonged, complex hospital course over approx 6 weeks, notable for acute respiratory failure, intubation, sepsis, CRRT, DVT, GIB, trach and PEG placement, He is admitted for acute multidisciplinary rehab on 6/14/24 to [facility].     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  #Hospital Acquired Debility   #Dysphagia  #Gait, ADL, Functional impairments  - Comprehensive Multidisciplinary Rehab, PT/OT/ SLP 3 hr/day 5d/wk  - AC: Eliquis 5 BID for DVT, seen on CTA/P 5/12  - ASA81 qD  - Pain: PRN Tylenol, Lidocaine patch   - Absolute Avoidance of Baclofen   - NPO/Tube Feeds as below    #Acute Hypoxemic, Hypercapnic Respiratory Failure, s/p ETT intubation  #s/p tracheostomy   - Albuterol neb q6h   - Continue TC w/ ATC  secretions less- slept with tessalon perle  trach capped-sats 100%  spoke with pulmonary- cap x 2 days then pull trach if sats OK  MBS to advance liquids once trach pulled      #ESRD on HD  #Fistula stenosis   #Anemia   - Continue HD M/W/F per renal   - Access is RUE AVF  - Regular monitoring of electrolytes  - EPOGEN w/ HD  - Iron supplementation, 300 mg daily   HD friday    #HTN  - Coreg 25 q12h  - Hydralazine 100 mg TID  - Norvasc 10 mg/D  - Isordil would be next agent added  BPs Stable    #NSTEMI, Demand Ischemia, resolved  - Initially, troponins mildly elevated  - TTE (4/30): EF 72, normal, no regional wall motion abnormalities   - No need to pursue cath at this time   - 20 mg lipitor at bedtime     #IDDM2, A1c 6.9  - NPH 4u q6h  - FS, ISS TIDAC  Glucoses OK with coverage    #R Common Iliac DVT  - Initially treated with heparin but then developed questionable GIB, transitioned to Eliquis   - IR was consulted, no plan for IVC filter  - Continue Eliquis 5 mg BID     #GI  - Concern melena 5/27, resolved, transfused   - GI consulted, not candidate for endoscopy due to surgical risk  - Concern for upper GI Bleed, GI felt not true bleed  - Continue PPI BID   - MIralax 17g daily  - Senna 2 @ bedtime   - Ducolax suppository daily PRN   last BM 7/2    #Oropharyngeal Dysphagia  #s/p PEG (5/17)  - Failed green dye 6/1   - Continue feeds: Card Steady 10 cc/hr incr. q6 goal 45cc/hr - feeds changed to Bolus  MBS 6/20- cleared for minced moist with mild thicks  supplement PO with G feeds if needed  speech advanced diet to soft with mild thicks  for MBS once trach pulled      #Mood / Cognition:  - Neuropsych evaluation given prolonged and complex hospitalization  - Chart mentions concern for depression w/ possible suicidal ideation   no further hallucinations  took Seroquel last night- claims poor sleep-  provigil in AM only    #/Bladder:  - Check urinary status on arrival, possibly anuric   spont voiding    #Dysphagia:    - Diet: tube feeds/ soft with mild thicks  SP MBS  - Ongoing SLP assessment- speech advanced diet   - Nutrition to follow  OOB For ALL MEALS    #Skin/ Pressure Injury Prevention:  - Assessment on admission   - Incisions:    #Precautions/ Restrictions  - Falls, Aspiration Precautions   - COVID PCR:      73-year-old male w/ past medical history hypertension, ESRD (HD MWF)  insulin-dependent DM presented 4/29/24 to [facility] 4/29 with hiccups and demand ischemia, was treated with baclofen, developed what appears to be toxic encephalopathy. He then sustained a prolonged, complex hospital course over approx 6 weeks, notable for acute respiratory failure, intubation, sepsis, CRRT, DVT, GIB, trach and PEG placement, He is admitted for acute multidisciplinary rehab on 6/14/24 to [facility].     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  #Hospital Acquired Debility   #Dysphagia  #Gait, ADL, Functional impairments  - Comprehensive Multidisciplinary Rehab, PT/OT/ SLP 3 hr/day 5d/wk  - AC: Eliquis 5 BID for DVT, seen on CTA/P 5/12  - ASA81 qD  - Pain: PRN Tylenol, Lidocaine patch   - Absolute Avoidance of Baclofen   - NPO/Tube Feeds as below    #Acute Hypoxemic, Hypercapnic Respiratory Failure, s/p ETT intubation  #s/p tracheostomy   - Albuterol neb q6h   - Continue TC w/ ATC  secretions less- slept with tessalon perle  trach capped-sats 100%  spoke with pulmonary- cap x 2 days then pull trach if sats OK  MBS to advance liquids once trach pulled      #ESRD on HD  #Fistula stenosis   #Anemia   - Continue HD M/W/F per renal   - Access is RUE AVF  - Regular monitoring of electrolytes  - EPOGEN w/ HD  - Iron supplementation, 300 mg daily   HD friday    #HTN  - Coreg 25 q12h  - Hydralazine 100 mg TID  - Norvasc 10 mg/D  - Isordil would be next agent added  BPs Stable    #NSTEMI, Demand Ischemia, resolved  - Initially, troponins mildly elevated  - TTE (4/30): EF 72, normal, no regional wall motion abnormalities   - No need to pursue cath at this time   - 20 mg lipitor at bedtime     #IDDM2, A1c 6.9  - NPH 4u q6h  - FS, ISS TIDAC  Glucoses OK with coverage    #R Common Iliac DVT  - Initially treated with heparin but then developed questionable GIB, transitioned to Eliquis   - IR was consulted, no plan for IVC filter  - Continue Eliquis 5 mg BID     #GI  - Concern melena 5/27, resolved, transfused   - GI consulted, not candidate for endoscopy due to surgical risk  - Concern for upper GI Bleed, GI felt not true bleed  - Continue PPI BID   - MIralax 17g daily  - Senna 2 @ bedtime   - Ducolax suppository daily PRN   last BM 7/2    #Oropharyngeal Dysphagia  #s/p PEG (5/17)  - Failed green dye 6/1   - Continue feeds: Card Steady 10 cc/hr incr. q6 goal 45cc/hr - feeds changed to Bolus  MBS 6/20- cleared for minced moist with mild thicks  supplement PO with G feeds if needed  speech advanced diet to soft with mild thicks  for MBS once trach pulled      #Mood / Cognition:  - Neuropsych evaluation given prolonged and complex hospitalization  - Chart mentions concern for depression w/ possible suicidal ideation   no further hallucinations  took Seroquel last night- claims poor sleep-  provigil in AM only    #/Bladder:  - Check urinary status on arrival, possibly anuric   spont voiding    #Dysphagia:    - Diet: tube feeds/ soft with mild thicks  SP MBS  - Ongoing SLP assessment- speech advanced diet   - Nutrition to follow  OOB For ALL MEALS    #Skin/ Pressure Injury Prevention:  - Assessment on admission   - Incisions:    #Precautions/ Restrictions  - Falls, Aspiration Precautions   - COVID PCR:     50 total minutes spent on today's encounter including team conference as well as phone conversation with patient's wife Pauline - I updated her on plan of care- All questions were answered by me as well.

## 2024-07-03 NOTE — PROGRESS NOTE ADULT - SUBJECTIVE AND OBJECTIVE BOX
Seen on HD    Vital Signs Last 24 Hrs  T(C): 36.8 (07-03-24 @ 20:04), Max: 36.8 (07-03-24 @ 13:25)  T(F): 98.2 (07-03-24 @ 20:04), Max: 98.2 (07-03-24 @ 13:25)  HR: 67 (07-03-24 @ 20:04) (53 - 67)  BP: 126/59 (07-03-24 @ 20:04) (124/55 - 138/66)  RR: 16 (07-03-24 @ 20:04) (15 - 16)  SpO2: 97% (07-03-24 @ 20:04) (96% - 100%)    Lungs b/l air entry  Heart S1S2  Abd soft ND  Extremities: tr edema  RUE avf                                                                                    8.9    8.50  )-----------( 274      ( 03 Jul 2024 13:30 )             28.1     03 Jul 2024 13:30    136    |  97     |  38     ----------------------------<  187    3.7     |  30     |  5.97     Ca    8.6        03 Jul 2024 13:30  Phos  4.4       03 Jul 2024 13:30    acetaminophen   Oral Liquid .. 650 milliGRAM(s) Oral every 6 hours PRN  albuterol    0.083% 2.5 milliGRAM(s) Nebulizer every 6 hours  amLODIPine   Tablet 10 milliGRAM(s) Oral daily  apixaban 5 milliGRAM(s) Enteral Tube two times a day  aspirin  chewable 81 milliGRAM(s) Oral daily  atorvastatin 20 milliGRAM(s) Oral at bedtime  benzonatate 100 milliGRAM(s) Oral <User Schedule>  bisacodyl Suppository 10 milliGRAM(s) Rectal daily PRN  carvedilol 25 milliGRAM(s) Oral every 12 hours  chlorhexidine 2% Cloths 1 Application(s) Topical <User Schedule>  dextrose 10% Bolus 125 milliLiter(s) IV Bolus once  dextrose 5%. 1000 milliLiter(s) IV Continuous <Continuous>  dextrose 5%. 1000 milliLiter(s) IV Continuous <Continuous>  dextrose 50% Injectable 12.5 Gram(s) IV Push once  dextrose 50% Injectable 25 Gram(s) IV Push once  dextrose Oral Gel 15 Gram(s) Oral once PRN  dextrose Oral Gel 15 Gram(s) Oral once PRN  epoetin reilly-epbx (RETACRIT) Injectable 86807 Unit(s) IV Push <User Schedule>  ferrous    sulfate Liquid 300 milliGRAM(s) Enteral Tube daily  glucagon  Injectable 1 milliGRAM(s) IntraMuscular once  hydrALAZINE 100 milliGRAM(s) Oral three times a day  insulin glargine Injectable (LANTUS) 5 Unit(s) SubCutaneous every morning  insulin lispro (ADMELOG) corrective regimen sliding scale   SubCutaneous three times a day before meals  lidocaine   4% Patch 1 Patch Transdermal every 24 hours  lidocaine   4% Patch 1 Patch Transdermal every 24 hours  melatonin 3 milliGRAM(s) Oral at bedtime PRN  methyl salicylate 15%/menthol 10% Topical Cream 1 Application(s) Topical <User Schedule>  modafinil 50 milliGRAM(s) Oral <User Schedule>  Nephro-noam 1 Tablet(s) Oral daily  pantoprazole   Suspension 40 milliGRAM(s) Enteral Tube two times a day  piperacillin/tazobactam IVPB.. 3.375 Gram(s) IV Intermittent every 12 hours  polyethylene glycol 3350 17 Gram(s) Oral daily  QUEtiapine 12.5 milliGRAM(s) Oral at bedtime  senna 2 Tablet(s) Oral at bedtime    A/P:    ESRD on HD  S/p complicated hospital course as per HPI  S/p CVA, trach, PEG  HD MWF  Epoetin w/HD 3 x week  Bld work w/each HD   Rehab    [phone]

## 2024-07-03 NOTE — PROGRESS NOTE ADULT - SUBJECTIVE AND OBJECTIVE BOX
Follow-up Pulmonary Progress Note  Chief Complaint : Cerebral infarction    patient seen and examined  started on capping this am  tolerating  as per RN no secretions  on room air sat 98%  pt in good spirits        Allergies :No Known Allergies      PAST MEDICAL & SURGICAL HISTORY:  Diabetes    Benign essential HTN    HLD (hyperlipidemia)    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    ESRD on hemodialysis    Arteriovenous fistula        Medications:  MEDICATIONS  (STANDING):  albuterol    0.083% 2.5 milliGRAM(s) Nebulizer every 6 hours  amLODIPine   Tablet 10 milliGRAM(s) Oral daily  apixaban 5 milliGRAM(s) Enteral Tube two times a day  aspirin  chewable 81 milliGRAM(s) Oral daily  atorvastatin 20 milliGRAM(s) Oral at bedtime  benzonatate 100 milliGRAM(s) Oral <User Schedule>  carvedilol 25 milliGRAM(s) Oral every 12 hours  chlorhexidine 2% Cloths 1 Application(s) Topical <User Schedule>  dextrose 10% Bolus 125 milliLiter(s) IV Bolus once  dextrose 5%. 1000 milliLiter(s) (50 mL/Hr) IV Continuous <Continuous>  dextrose 5%. 1000 milliLiter(s) (100 mL/Hr) IV Continuous <Continuous>  dextrose 50% Injectable 12.5 Gram(s) IV Push once  dextrose 50% Injectable 25 Gram(s) IV Push once  epoetin reilly-epbx (RETACRIT) Injectable 19221 Unit(s) IV Push <User Schedule>  ferrous    sulfate Liquid 300 milliGRAM(s) Enteral Tube daily  glucagon  Injectable 1 milliGRAM(s) IntraMuscular once  hydrALAZINE 100 milliGRAM(s) Oral three times a day  insulin glargine Injectable (LANTUS) 5 Unit(s) SubCutaneous every morning  insulin lispro (ADMELOG) corrective regimen sliding scale   SubCutaneous three times a day before meals  lidocaine   4% Patch 1 Patch Transdermal every 24 hours  lidocaine   4% Patch 1 Patch Transdermal every 24 hours  methyl salicylate 15%/menthol 10% Topical Cream 1 Application(s) Topical <User Schedule>  modafinil 50 milliGRAM(s) Oral <User Schedule>  Nephro-noam 1 Tablet(s) Oral daily  pantoprazole   Suspension 40 milliGRAM(s) Enteral Tube two times a day  piperacillin/tazobactam IVPB.. 3.375 Gram(s) IV Intermittent every 12 hours  polyethylene glycol 3350 17 Gram(s) Oral daily  QUEtiapine 12.5 milliGRAM(s) Oral at bedtime  senna 2 Tablet(s) Oral at bedtime    MEDICATIONS  (PRN):  acetaminophen   Oral Liquid .. 650 milliGRAM(s) Oral every 6 hours PRN Moderate Pain (4 - 6)  bisacodyl Suppository 10 milliGRAM(s) Rectal daily PRN Constipation  dextrose Oral Gel 15 Gram(s) Oral once PRN Blood Glucose LESS THAN 70 milliGRAM(s)/deciliter  dextrose Oral Gel 15 Gram(s) Oral once PRN Blood Glucose LESS THAN 70 milliGRAM(s)/deciliter  melatonin 3 milliGRAM(s) Oral at bedtime PRN Insomnia      Antibiotics History  piperacillin/tazobactam IVPB.. 3.375 Gram(s) IV Intermittent every 12 hours, 06-29-24 @ 22:03, Stop order after: 7 Days      Heme Medications   apixaban 5 milliGRAM(s) Enteral Tube two times a day, 06-14-24 @ 18:29  aspirin  chewable 81 milliGRAM(s) Oral daily, 06-15-24 @ 00:00      GI Medications  bisacodyl Suppository 10 milliGRAM(s) Rectal daily, 06-14-24 @ 18:30, Routine PRN  pantoprazole   Suspension 40 milliGRAM(s) Enteral Tube two times a day, 06-14-24 @ 21:14, Routine  polyethylene glycol 3350 17 Gram(s) Oral daily, 06-15-24 @ 00:00, Routine  senna 2 Tablet(s) Oral at bedtime, 06-14-24 @ 18:29, Routine        LABS:                        8.9    8.50  )-----------( 274      ( 03 Jul 2024 13:30 )             28.1     07-03    136  |  97  |  38<H>  ----------------------------<  187<H>  3.7   |  30  |  5.97<H>    Ca    8.6      03 Jul 2024 13:30  Phos  4.4     07-03                Urinalysis Basic - ( 03 Jul 2024 13:30 )    Color: x / Appearance: x / SG: x / pH: x  Gluc: 187 mg/dL / Ketone: x  / Bili: x / Urobili: x   Blood: x / Protein: x / Nitrite: x   Leuk Esterase: x / RBC: x / WBC x   Sq Epi: x / Non Sq Epi: x / Bacteria: x              CULTURES: (if applicable)    Culture - Sputum (collected 06-30-24 @ 09:00)  Source: .Sputum Sputum  Gram Stain (06-30-24 @ 23:39):    Numerous polymorphonuclear leukocytes per low power field    Few Squamous epithelial cells per low power field    Few Gram Negative Rods per oil power field    Few Gram positive cocci in pairs per oil power field  Final Report (07-02-24 @ 14:20):    Normal Respiratory Jocelyn present    Culture - Blood (collected 06-30-24 @ 00:00)  Source: .Blood Blood-Peripheral  Preliminary Report (07-03-24 @ 11:00):    No growth at 72 Hours    Culture - Blood (collected 06-30-24 @ 00:00)  Source: .Blood Blood-Peripheral  Preliminary Report (07-03-24 @ 11:00):    No growth at 72 Hours      Rapid RVP Result: NotDetec (06-29-24 @ 22:30)           VITALS:  T(C): 36.8 (07-03-24 @ 13:25), Max: 36.8 (07-03-24 @ 13:25)  T(F): 98.2 (07-03-24 @ 13:25), Max: 98.2 (07-03-24 @ 13:25)  HR: 53 (07-03-24 @ 13:25) (53 - 60)  BP: 137/64 (07-03-24 @ 13:25) (127/66 - 144/64)  BP(mean): --  ABP: --  ABP(mean): --  RR: 16 (07-03-24 @ 13:25) (15 - 16)  SpO2: 100% (07-03-24 @ 13:25) (96% - 100%)  CVP(mm Hg): --  CVP(cm H2O): --    Ins and Outs

## 2024-07-03 NOTE — PROGRESS NOTE ADULT - ASSESSMENT
Physical Exam   GENERAL:               Alert,  No acute distress.    HEENT:                   No JVD, Dry MM + trach with no secretion   PULM:                     Bilateral air entry, diminished to auscultation bilaterally, no significant sputum production, No Rales, No Rhonchi, No Wheezing  CVS:                         S1, S2,  No Murmur  ABD:                        Soft, nondistended, nontender, normoactive bowel sounds, + PEG  EXT:                         No edema, nontender, No Cyanosis or Clubbing    NEURO:                  Alert,  interactive,  follows commands  PSYC:                      Calm,      Assessment  1. Chronic Respiratory failure s/p Trach  2. AMS appears to have improved , ? Baclofen toxicity vs CVA  3. ESRD on HD, DM 2,   4. Right iliac DVT on Eliquis      Plan   Finish 5-7 Day course of antibiotics  daily capping trials  CXR today improved  if tolerate capping tom and friday plan to decanulate friday  n/c o2 if sat < 92% on cap      trach care     continue Eliquis for DVT   will follow   d/w bedside team/RN

## 2024-07-03 NOTE — PROGRESS NOTE ADULT - SUBJECTIVE AND OBJECTIVE BOX
Patient is a 73y old  Male who presents with a chief complaint of Rehab    Nursing reports secretions have improved  Denies chest pain  However, notes coughing intermittently      Patient seen and examined at bedside.    ALLERGIES:  No Known Allergies    MEDICATIONS  (STANDING):  albuterol    0.083% 2.5 milliGRAM(s) Nebulizer every 6 hours  amLODIPine   Tablet 10 milliGRAM(s) Oral daily  apixaban 5 milliGRAM(s) Enteral Tube two times a day  aspirin  chewable 81 milliGRAM(s) Oral daily  atorvastatin 20 milliGRAM(s) Oral at bedtime  benzonatate 100 milliGRAM(s) Oral <User Schedule>  carvedilol 25 milliGRAM(s) Oral every 12 hours  chlorhexidine 2% Cloths 1 Application(s) Topical <User Schedule>  dextrose 10% Bolus 125 milliLiter(s) IV Bolus once  dextrose 5%. 1000 milliLiter(s) (50 mL/Hr) IV Continuous <Continuous>  dextrose 5%. 1000 milliLiter(s) (100 mL/Hr) IV Continuous <Continuous>  dextrose 50% Injectable 25 Gram(s) IV Push once  dextrose 50% Injectable 12.5 Gram(s) IV Push once  epoetin reilly-epbx (RETACRIT) Injectable 38144 Unit(s) IV Push <User Schedule>  ferrous    sulfate Liquid 300 milliGRAM(s) Enteral Tube daily  glucagon  Injectable 1 milliGRAM(s) IntraMuscular once  hydrALAZINE 100 milliGRAM(s) Oral three times a day  insulin glargine Injectable (LANTUS) 5 Unit(s) SubCutaneous every morning  insulin lispro (ADMELOG) corrective regimen sliding scale   SubCutaneous three times a day before meals  lidocaine   4% Patch 1 Patch Transdermal every 24 hours  lidocaine   4% Patch 1 Patch Transdermal every 24 hours  methyl salicylate 15%/menthol 10% Topical Cream 1 Application(s) Topical <User Schedule>  modafinil 50 milliGRAM(s) Oral <User Schedule>  Nephro-noam 1 Tablet(s) Oral daily  pantoprazole   Suspension 40 milliGRAM(s) Enteral Tube two times a day  piperacillin/tazobactam IVPB.. 3.375 Gram(s) IV Intermittent every 12 hours  polyethylene glycol 3350 17 Gram(s) Oral daily  QUEtiapine 12.5 milliGRAM(s) Oral at bedtime  senna 2 Tablet(s) Oral at bedtime    MEDICATIONS  (PRN):  acetaminophen   Oral Liquid .. 650 milliGRAM(s) Oral every 6 hours PRN Moderate Pain (4 - 6)  bisacodyl Suppository 10 milliGRAM(s) Rectal daily PRN Constipation  dextrose Oral Gel 15 Gram(s) Oral once PRN Blood Glucose LESS THAN 70 milliGRAM(s)/deciliter  dextrose Oral Gel 15 Gram(s) Oral once PRN Blood Glucose LESS THAN 70 milliGRAM(s)/deciliter  melatonin 3 milliGRAM(s) Oral at bedtime PRN Insomnia    Vital Signs Last 24 Hrs  T(F): 98.1 (03 Jul 2024 08:25), Max: 98.1 (03 Jul 2024 08:25)  HR: 57 (03 Jul 2024 08:25) (56 - 60)  BP: 138/66 (03 Jul 2024 08:25) (127/66 - 144/64)  RR: 16 (03 Jul 2024 08:25) (15 - 16)  SpO2: 98% (03 Jul 2024 08:25) (96% - 98%)  I&O's Summary    PHYSICAL EXAM:  General: NAD, A/O   Trach noted; canister with pale colored secretions  ENT: MMM, no scleral icterus  Neck: Supple, No JVD, no thyroidomegaly  Lungs: Clear to auscultation bilaterally, no wheezes, no rales, no rhonchi, good inspiratory effort  Cardio: RRR, S1/S2, No murmurs  Abdomen: Soft, Nontender, Nondistended; Bowel sounds present  Extremities: No calf tenderness, No pitting edema, no skin changes    LABS:                        8.4    10.77 )-----------( 245      ( 01 Jul 2024 16:00 )             25.8       07-01    129  |  90  |  65  ----------------------------<  154  4.4   |  27  |  6.56    Ca    8.8      01 Jul 2024 16:00  Phos  3.9     07-01    05-17 Chol 86 mg/dL LDL -- HDL 27 mg/dL Trig 151 mg/dL    POCT Blood Glucose.: 163 mg/dL (03 Jul 2024 07:44)  POCT Blood Glucose.: 143 mg/dL (02 Jul 2024 21:26)  POCT Blood Glucose.: 142 mg/dL (02 Jul 2024 16:49)  POCT Blood Glucose.: 176 mg/dL (02 Jul 2024 12:12)      Urinalysis Basic - ( 01 Jul 2024 16:00 )    Color: x / Appearance: x / SG: x / pH: x  Gluc: 154 mg/dL / Ketone: x  / Bili: x / Urobili: x   Blood: x / Protein: x / Nitrite: x   Leuk Esterase: x / RBC: x / WBC x   Sq Epi: x / Non Sq Epi: x / Bacteria: x    Culture - Sputum (collected 30 Jun 2024 09:00)  Source: .Sputum Sputum  Gram Stain (30 Jun 2024 23:39):    Numerous polymorphonuclear leukocytes per low power field    Few Squamous epithelial cells per low power field    Few Gram Negative Rods per oil power field    Few Gram positive cocci in pairs per oil power field  Final Report (02 Jul 2024 14:20):    Normal Respiratory Jocelyn present    Culture - Blood (collected 30 Jun 2024 00:00)  Source: .Blood Blood-Peripheral  Preliminary Report (02 Jul 2024 11:01):    No growth at 48 Hours    Culture - Blood (collected 30 Jun 2024 00:00)  Source: .Blood Blood-Peripheral  Preliminary Report (02 Jul 2024 11:01):    No growth at 48 Hours      COVID-19 PCR: NotDetec (06-14-24 @ 18:56)  COVID-19 PCR: NotDetec (06-14-24 @ 18:56)  COVID-19 PCR: NotDetec (01-19-24 @ 15:09)  COVID-19 PCR: NotDetec (08-31-22 @ 11:09)

## 2024-07-03 NOTE — PROGRESS NOTE ADULT - SUBJECTIVE AND OBJECTIVE BOX
73-year-old male w/ past medical history hypertension, ESRD (HD MWF)  insulin-dependent DM presented 4/29/24 to [facility] with hiccups, chest pain of several days duration, admitted for concern demand ischemia. Early on, he was given baclofen for his hiccups, but soon developed AMS with 2 RRTs by 5/2/24, prompting intubation, upgrade to MICU. There was concern that baclofen toxicity was the culprit of his then-encephalopathic state, as patient had also missed a HD session due to a clotted fistula.     In coming days, he was treated empirically with 5 days of zosyn, had a negative CTH and LP, and had an EEG that did not capture a seizure but showed diffuse non-specific cerebral dysfunction - c/w toxic-metabolic encephalopathies. On 5/6, an MRI showed that the patient had sustained R pontine and cerebellar stroke. He was extubated on 5/9, but owing to a challenging extubation became septic w/ B Cereus, treated with Vanc. He had trach placed 5/14, PEG 5/17, then downgraded.     On the floors, he was followed by several services, including Neurology, who mention the strokes were embolic appearing. Subsequent hospital course notable for nonocclusive R common iliac DVT, for which he was started on a heparin drip which was stopped when patient developed an upper GI Bleed, though GI was consulted and felt he did not have an overt bleed. His dialysis fistula site was stenotic, s/p fistulogram, ultimately changed to a RUE AVF 6/5. He was ultimately started on Eliquis 6/7 for the DVT. Medically stable, he is admitted to [facility] on 6/14/24.     ROS/subjective:  patient seen and evaluated bedside  slept well  seen in bed again- refused AM therapy/ OOB/ Breakfast per nursing  Team insisted on getting patient OOB-he agreed- taken OOB to chair for breakfast  diet upgraded by speech to soft with mild thicks  Trach capped by me- minimal secretions overnight- sats 98% RM air  no dizziness, no headaches, no SOB, No Chest pain, No nausea, no vomiting  no teeth pain  last BM 7/2      MEDICATIONS  (STANDING):  albuterol    0.083% 2.5 milliGRAM(s) Nebulizer every 6 hours  amLODIPine   Tablet 10 milliGRAM(s) Oral daily  apixaban 5 milliGRAM(s) Enteral Tube two times a day  aspirin  chewable 81 milliGRAM(s) Oral daily  atorvastatin 20 milliGRAM(s) Oral at bedtime  carvedilol 25 milliGRAM(s) Oral every 12 hours  chlorhexidine 2% Cloths 1 Application(s) Topical <User Schedule>  dextrose 10% Bolus 125 milliLiter(s) IV Bolus once  dextrose 5%. 1000 milliLiter(s) (50 mL/Hr) IV Continuous <Continuous>  dextrose 5%. 1000 milliLiter(s) (100 mL/Hr) IV Continuous <Continuous>  dextrose 50% Injectable 12.5 Gram(s) IV Push once  dextrose 50% Injectable 25 Gram(s) IV Push once  epoetin reilly-epbx (RETACRIT) Injectable 49851 Unit(s) IV Push <User Schedule>  ferrous    sulfate Liquid 300 milliGRAM(s) Enteral Tube daily  glucagon  Injectable 1 milliGRAM(s) IntraMuscular once  hydrALAZINE 100 milliGRAM(s) Oral three times a day  insulin glargine Injectable (LANTUS) 5 Unit(s) SubCutaneous every morning  insulin lispro (ADMELOG) corrective regimen sliding scale   SubCutaneous three times a day before meals  lidocaine   4% Patch 1 Patch Transdermal every 24 hours  lidocaine   4% Patch 1 Patch Transdermal every 24 hours  methyl salicylate 15%/menthol 10% Topical Cream 1 Application(s) Topical <User Schedule>  modafinil 50 milliGRAM(s) Oral <User Schedule>  Nephro-noam 1 Tablet(s) Oral daily  pantoprazole   Suspension 40 milliGRAM(s) Enteral Tube two times a day  piperacillin/tazobactam IVPB.. 3.375 Gram(s) IV Intermittent every 12 hours  polyethylene glycol 3350 17 Gram(s) Oral daily  QUEtiapine 12.5 milliGRAM(s) Oral at bedtime  senna 2 Tablet(s) Oral at bedtime    MEDICATIONS  (PRN):  acetaminophen   Oral Liquid .. 650 milliGRAM(s) Oral every 6 hours PRN Moderate Pain (4 - 6)  bisacodyl Suppository 10 milliGRAM(s) Rectal daily PRN Constipation  dextrose Oral Gel 15 Gram(s) Oral once PRN Blood Glucose LESS THAN 70 milliGRAM(s)/deciliter  dextrose Oral Gel 15 Gram(s) Oral once PRN Blood Glucose LESS THAN 70 milliGRAM(s)/deciliter  melatonin 3 milliGRAM(s) Oral at bedtime PRN Insomnia                            8.4    10.77 )-----------( 245      ( 01 Jul 2024 16:00 )             25.8     07-01    129<L>  |  90<L>  |  65<H>  ----------------------------<  154<H>  4.4   |  27  |  6.56<H>    Ca    8.8      01 Jul 2024 16:00  Phos  3.9     07-01      Urinalysis Basic - ( 01 Jul 2024 16:00 )    Color: x / Appearance: x / SG: x / pH: x  Gluc: 154 mg/dL / Ketone: x  / Bili: x / Urobili: x   Blood: x / Protein: x / Nitrite: x   Leuk Esterase: x / RBC: x / WBC x   Sq Epi: x / Non Sq Epi: x / Bacteria: x    CAPILLARY BLOOD GLUCOSE      POCT Blood Glucose.: 201 mg/dL (03 Jul 2024 11:45)  POCT Blood Glucose.: 163 mg/dL (03 Jul 2024 07:44)  POCT Blood Glucose.: 143 mg/dL (02 Jul 2024 21:26)  POCT Blood Glucose.: 142 mg/dL (02 Jul 2024 16:49)      Vital Signs Last 24 Hrs  T(C): 36.8 (03 Jul 2024 13:25), Max: 36.8 (03 Jul 2024 13:25)  T(F): 98.2 (03 Jul 2024 13:25), Max: 98.2 (03 Jul 2024 13:25)  HR: 53 (03 Jul 2024 13:25) (53 - 60)  BP: 137/64 (03 Jul 2024 13:25) (127/66 - 144/64)  BP(mean): --  RR: 16 (03 Jul 2024 13:25) (15 - 16)  SpO2: 100% (03 Jul 2024 13:25) (96% - 100%)    Parameters below as of 03 Jul 2024 13:25  Patient On (Oxygen Delivery Method): room air        Constitutional - NAD, Comfortable with trach collar/PMV  HEENT - NCAT,   Neck - Supple, No limited ROM  Chest - good chest expansion, good respiratory effort coarse BS  Cardio - warm and well perfused,   Abdomen -  Soft, NTND. + peg.   Extremities - No peripheral edema, No calf tenderness. RUE fistula.   Neurologic Exam:                    Cognitive -      Cranial Nerves - No facial asymmetry, Tongue midline, Shoulder shrug intact no facial droop     Motor -                      LEFT    UE - ShAB 4/5, EF 4/5, EE 3/5, WE 4/5,  WNL                    RIGHT UE - ShAB 4/5, EF 4/5, EE 3/5, WE 4/5,  WNL                    LEFT    LE - HF 3/5, KE 4/5, DF 4/5, PF 4/5                    RIGHT LE - HF 3/5, KE 4/5, DF 4/5, PF 4/5        Sensory - Intact to LT bilateral in face, arm and legs.      Reflexes - DTR 1 / 4 biceps symmetric. neg Latham's b/l, No clonus.  Psychiatric - Mood stable, Affect WNL  Skin on admission: no sacral pressure injuries.      IDT in [facility] 7/3  Tentative DC 7/9 to home-  family training on Friday

## 2024-07-04 LAB
GLUCOSE BLDC GLUCOMTR-MCNC: 127 MG/DL — HIGH (ref 70–99)
GLUCOSE BLDC GLUCOMTR-MCNC: 182 MG/DL — HIGH (ref 70–99)
GLUCOSE BLDC GLUCOMTR-MCNC: 197 MG/DL — HIGH (ref 70–99)
GLUCOSE BLDC GLUCOMTR-MCNC: 219 MG/DL — HIGH (ref 70–99)

## 2024-07-04 PROCEDURE — 99233 SBSQ HOSP IP/OBS HIGH 50: CPT

## 2024-07-04 PROCEDURE — 99232 SBSQ HOSP IP/OBS MODERATE 35: CPT

## 2024-07-04 RX ADMIN — Medication 650 MILLIGRAM(S): at 10:49

## 2024-07-04 RX ADMIN — Medication 3 MILLIGRAM(S): at 21:49

## 2024-07-04 RX ADMIN — INSULIN LISPRO 2: 100 INJECTION, SOLUTION SUBCUTANEOUS at 12:10

## 2024-07-04 RX ADMIN — Medication 650 MILLIGRAM(S): at 19:19

## 2024-07-04 RX ADMIN — HYDRALAZINE HYDROCHLORIDE 100 MILLIGRAM(S): 50 TABLET ORAL at 06:35

## 2024-07-04 RX ADMIN — CARVEDILOL PHOSPHATE 25 MILLIGRAM(S): 80 CAPSULE, EXTENDED RELEASE ORAL at 06:35

## 2024-07-04 RX ADMIN — Medication 1 APPLICATION(S): at 07:36

## 2024-07-04 RX ADMIN — ASPIRIN 81 MILLIGRAM(S): 325 TABLET, FILM COATED ORAL at 12:10

## 2024-07-04 RX ADMIN — PANTOPRAZOLE SODIUM 40 MILLIGRAM(S): 40 INJECTION, POWDER, FOR SOLUTION INTRAVENOUS at 17:24

## 2024-07-04 RX ADMIN — Medication 2.5 MILLIGRAM(S): at 21:43

## 2024-07-04 RX ADMIN — PANTOPRAZOLE SODIUM 40 MILLIGRAM(S): 40 INJECTION, POWDER, FOR SOLUTION INTRAVENOUS at 06:36

## 2024-07-04 RX ADMIN — PIPERACILLIN SODIUM AND TAZOBACTAM SODIUM 25 GRAM(S): 3; .375 INJECTION, POWDER, LYOPHILIZED, FOR SOLUTION INTRAVENOUS at 06:36

## 2024-07-04 RX ADMIN — PIPERACILLIN SODIUM AND TAZOBACTAM SODIUM 25 GRAM(S): 3; .375 INJECTION, POWDER, LYOPHILIZED, FOR SOLUTION INTRAVENOUS at 17:24

## 2024-07-04 RX ADMIN — HYDRALAZINE HYDROCHLORIDE 100 MILLIGRAM(S): 50 TABLET ORAL at 14:35

## 2024-07-04 RX ADMIN — APIXABAN 5 MILLIGRAM(S): 5 TABLET, FILM COATED ORAL at 06:35

## 2024-07-04 RX ADMIN — Medication 2.5 MILLIGRAM(S): at 15:06

## 2024-07-04 RX ADMIN — LIDOCAINE HCL 1 PATCH: 28 GEL TOPICAL at 19:27

## 2024-07-04 RX ADMIN — INSULIN LISPRO 1: 100 INJECTION, SOLUTION SUBCUTANEOUS at 08:24

## 2024-07-04 RX ADMIN — INSULIN GLARGINE 5 UNIT(S): 100 INJECTION, SOLUTION SUBCUTANEOUS at 08:24

## 2024-07-04 RX ADMIN — LIDOCAINE HCL 1 PATCH: 28 GEL TOPICAL at 17:24

## 2024-07-04 RX ADMIN — Medication 2.5 MILLIGRAM(S): at 07:57

## 2024-07-04 RX ADMIN — Medication 1 TABLET(S): at 12:10

## 2024-07-04 RX ADMIN — MENTHOL, METHYL SALICYLATE 1 APPLICATION(S): 63; 210 PATCH PERCUTANEOUS; TOPICAL; TRANSDERMAL at 14:00

## 2024-07-04 RX ADMIN — BENZONATATE 100 MILLIGRAM(S): 100 TABLET ORAL at 21:50

## 2024-07-04 RX ADMIN — MENTHOL, METHYL SALICYLATE 1 APPLICATION(S): 63; 210 PATCH PERCUTANEOUS; TOPICAL; TRANSDERMAL at 07:36

## 2024-07-04 RX ADMIN — HYDRALAZINE HYDROCHLORIDE 100 MILLIGRAM(S): 50 TABLET ORAL at 21:48

## 2024-07-04 RX ADMIN — AMLODIPINE BESYLATE 10 MILLIGRAM(S): 2.5 TABLET ORAL at 06:35

## 2024-07-04 RX ADMIN — ATORVASTATIN CALCIUM 20 MILLIGRAM(S): 20 TABLET, FILM COATED ORAL at 21:50

## 2024-07-04 RX ADMIN — POLYETHYLENE GLYCOL 3350 17 GRAM(S): 1 POWDER ORAL at 12:10

## 2024-07-04 RX ADMIN — Medication 12.5 MILLIGRAM(S): at 21:50

## 2024-07-04 RX ADMIN — Medication 300 MILLIGRAM(S): at 12:10

## 2024-07-04 RX ADMIN — LIDOCAINE HCL 1 PATCH: 28 GEL TOPICAL at 19:28

## 2024-07-04 RX ADMIN — Medication 650 MILLIGRAM(S): at 20:00

## 2024-07-04 RX ADMIN — APIXABAN 5 MILLIGRAM(S): 5 TABLET, FILM COATED ORAL at 17:24

## 2024-07-04 RX ADMIN — MODAFINIL 50 MILLIGRAM(S): 100 TABLET ORAL at 06:36

## 2024-07-04 RX ADMIN — Medication 2 TABLET(S): at 21:49

## 2024-07-04 NOTE — PROGRESS NOTE ADULT - SUBJECTIVE AND OBJECTIVE BOX
Patient seen and examined at bedside. no complaints.      ALLERGIES:  No Known Allergies    MEDICATIONS  (STANDING):  albuterol    0.083% 2.5 milliGRAM(s) Nebulizer every 6 hours  amLODIPine   Tablet 10 milliGRAM(s) Oral daily  apixaban 5 milliGRAM(s) Enteral Tube two times a day  aspirin  chewable 81 milliGRAM(s) Oral daily  atorvastatin 20 milliGRAM(s) Oral at bedtime  benzonatate 100 milliGRAM(s) Oral <User Schedule>  carvedilol 25 milliGRAM(s) Oral every 12 hours  chlorhexidine 2% Cloths 1 Application(s) Topical <User Schedule>  dextrose 10% Bolus 125 milliLiter(s) IV Bolus once  dextrose 5%. 1000 milliLiter(s) (50 mL/Hr) IV Continuous <Continuous>  dextrose 5%. 1000 milliLiter(s) (100 mL/Hr) IV Continuous <Continuous>  dextrose 50% Injectable 12.5 Gram(s) IV Push once  dextrose 50% Injectable 25 Gram(s) IV Push once  epoetin reilly-epbx (RETACRIT) Injectable 81638 Unit(s) IV Push <User Schedule>  ferrous    sulfate Liquid 300 milliGRAM(s) Enteral Tube daily  glucagon  Injectable 1 milliGRAM(s) IntraMuscular once  hydrALAZINE 100 milliGRAM(s) Oral three times a day  insulin glargine Injectable (LANTUS) 5 Unit(s) SubCutaneous every morning  insulin lispro (ADMELOG) corrective regimen sliding scale   SubCutaneous three times a day before meals  lidocaine   4% Patch 1 Patch Transdermal every 24 hours  lidocaine   4% Patch 1 Patch Transdermal every 24 hours  methyl salicylate 15%/menthol 10% Topical Cream 1 Application(s) Topical <User Schedule>  modafinil 50 milliGRAM(s) Oral <User Schedule>  Nephro-noam 1 Tablet(s) Oral daily  pantoprazole   Suspension 40 milliGRAM(s) Enteral Tube two times a day  piperacillin/tazobactam IVPB.. 3.375 Gram(s) IV Intermittent every 12 hours  polyethylene glycol 3350 17 Gram(s) Oral daily  QUEtiapine 12.5 milliGRAM(s) Oral at bedtime  senna 2 Tablet(s) Oral at bedtime    MEDICATIONS  (PRN):  acetaminophen   Oral Liquid .. 650 milliGRAM(s) Oral every 6 hours PRN Moderate Pain (4 - 6)  bisacodyl Suppository 10 milliGRAM(s) Rectal daily PRN Constipation  dextrose Oral Gel 15 Gram(s) Oral once PRN Blood Glucose LESS THAN 70 milliGRAM(s)/deciliter  dextrose Oral Gel 15 Gram(s) Oral once PRN Blood Glucose LESS THAN 70 milliGRAM(s)/deciliter  melatonin 3 milliGRAM(s) Oral at bedtime PRN Insomnia    Vital Signs Last 24 Hrs  T(F): 98.3 (04 Jul 2024 08:58), Max: 98.3 (04 Jul 2024 08:58)  HR: 59 (04 Jul 2024 08:58) (53 - 67)  BP: 115/56 (04 Jul 2024 08:58) (115/56 - 151/62)  RR: 16 (04 Jul 2024 08:58) (14 - 16)  SpO2: 97% (04 Jul 2024 08:58) (97% - 100%)  I&O's Summary    03 Jul 2024 07:01  -  04 Jul 2024 07:00  --------------------------------------------------------  IN: 437 mL / OUT: 0 mL / NET: 437 mL        PHYSICAL EXAM:    Gen: nad, resting in bed  Neuro: aaox3, no focal deficits  Heent: eomi b/l, no jvd, no oral exudates, tracheostomy with cap during exam  Pulm: cta b/l, no w/r/r  CV: +s1s2, no m/r/g  Ab: soft, nt/nd, normoactive bs x 4  Extrem: no edema, pulses intact and equal  Skin: no rashes  Psych: normal    LABS:                        8.9    8.50  )-----------( 274      ( 03 Jul 2024 13:30 )             28.1       07-03    136  |  97  |  38  ----------------------------<  187  3.7   |  30  |  5.97    Ca    8.6      03 Jul 2024 13:30  Phos  4.4     07-03                  05-17 Chol 86 mg/dL LDL -- HDL 27 mg/dL Trig 151 mg/dL              POCT Blood Glucose.: 197 mg/dL (04 Jul 2024 08:11)  POCT Blood Glucose.: 159 mg/dL (03 Jul 2024 17:30)  POCT Blood Glucose.: 201 mg/dL (03 Jul 2024 11:45)      Urinalysis Basic - ( 03 Jul 2024 13:30 )    Color: x / Appearance: x / SG: x / pH: x  Gluc: 187 mg/dL / Ketone: x  / Bili: x / Urobili: x   Blood: x / Protein: x / Nitrite: x   Leuk Esterase: x / RBC: x / WBC x   Sq Epi: x / Non Sq Epi: x / Bacteria: x        Culture - Sputum (collected 30 Jun 2024 09:00)  Source: .Sputum Sputum  Gram Stain (30 Jun 2024 23:39):    Numerous polymorphonuclear leukocytes per low power field    Few Squamous epithelial cells per low power field    Few Gram Negative Rods per oil power field    Few Gram positive cocci in pairs per oil power field  Final Report (02 Jul 2024 14:20):    Normal Respiratory Jocelyn present    Culture - Blood (collected 30 Jun 2024 00:00)  Source: .Blood Blood-Peripheral  Preliminary Report (04 Jul 2024 11:01):    No growth at 4 days    Culture - Blood (collected 30 Jun 2024 00:00)  Source: .Blood Blood-Peripheral  Preliminary Report (04 Jul 2024 11:01):    No growth at 4 days      COVID-19 PCR: NotDetec (06-14-24 @ 18:56)      RADIOLOGY & ADDITIONAL TESTS:    Care Discussed with Consultants/Other Providers:

## 2024-07-04 NOTE — PROGRESS NOTE ADULT - ASSESSMENT
73M HTN, ESRD (HD MWF via AVG) DM who was admitted for unstable angina. Course complicated by AMS secondary to acute CVA vs Baclofen toxicity requiring intubation s/p Trach and PEG. Also c/b DVT and GIB. Notable course for AV Fistula dysfunction requiring balloon angioplasty.    #Recent AMS suspect due to baclofen toxicity vs CVA  #Acute CVA  - MRI head 5/6 showed acute infarct in left talya and left cerebellum   - continue holding baclofen  - continue ASA and statin  - continue Modafinil    #Acute Hypoxemic, Hypercapnic Respiratory Failure, s/p ETT intubation  #increased secretions  #Aspiration PNA?  - recent low grade fever, leukocytosis, elevated procal, CXR with left sided opacity now on zosyn q12h for 5 days  - scopolamine patch  - s/p tracheostomy   - Albuterol neb q6h   - pulm following  - oral suction, minimize trach suctioning     #ESRD on HD MWF via RT AFF  - stable, continue HD    #R Common Iliac DVT  - CT AP (5/12) with nonocclusive RIGHT common iliac vein deep venous thrombosis  - Continue Eliquis 5 mg BID     #Hallucination  - Seroquel 12.5mg HS  - Psych following    #HTN  - Coreg 25 q12h  - Hydralazine 100 mg TID  - Norvasc 10 mg/D  - BP acceptable     #DM2   - A1c 6.9  - Lantus 5 qAM, ISS    #Oropharyngeal Dysphagia  - s/p PEG (5/17)  - Continue PO diet and supplement with TF if po intake is poor    DVT PPX: Eliquis

## 2024-07-04 NOTE — PROGRESS NOTE ADULT - ASSESSMENT
Physical Exam   GENERAL:               Alert,  No acute distress.    HEENT:                   No JVD, Dry MM + trach with no secretion   PULM:                     Bilateral air entry, diminished to auscultation bilaterally, no significant sputum production, No Rales, No Rhonchi, No Wheezing  CVS:                         S1, S2,  No Murmur  ABD:                        Soft, nondistended, nontender, normoactive bowel sounds, + PEG   NEURO:                  Alert,  interactive,  follows commands  PSYC:                      Calm,      Assessment  1. Chronic Respiratory failure s/p Trach  2. AMS appears to have improved , ? Baclofen toxicity vs CVA  3. ESRD on HD, DM 2,   4. Right iliac DVT on Eliquis      Plan   Finish 5-7 Day course of antibiotics  daily capping trials  if tolerate capping today and secretions remain stable and thin  possible decanulation in am    n/c o2 if sat < 92% on cap      trach care     continue Eliquis for DVT   will follow   d/w bedside  RN

## 2024-07-04 NOTE — PROGRESS NOTE ADULT - SUBJECTIVE AND OBJECTIVE BOX
Cc: CVA, Encephalopathy    HPI: Patient with no new medical issues today.  Pain controlled, no chest pain, no N/V, no Fevers/Chills. No other new ROS    acetaminophen   Oral Liquid .. 650 milliGRAM(s) Oral every 6 hours PRN  albuterol    0.083% 2.5 milliGRAM(s) Nebulizer every 6 hours  amLODIPine   Tablet 10 milliGRAM(s) Oral daily  apixaban 5 milliGRAM(s) Enteral Tube two times a day  aspirin  chewable 81 milliGRAM(s) Oral daily  atorvastatin 20 milliGRAM(s) Oral at bedtime  benzonatate 100 milliGRAM(s) Oral <User Schedule>  bisacodyl Suppository 10 milliGRAM(s) Rectal daily PRN  carvedilol 25 milliGRAM(s) Oral every 12 hours  chlorhexidine 2% Cloths 1 Application(s) Topical <User Schedule>  dextrose 10% Bolus 125 milliLiter(s) IV Bolus once  dextrose 5%. 1000 milliLiter(s) IV Continuous <Continuous>  dextrose 5%. 1000 milliLiter(s) IV Continuous <Continuous>  dextrose 50% Injectable 12.5 Gram(s) IV Push once  dextrose 50% Injectable 25 Gram(s) IV Push once  dextrose Oral Gel 15 Gram(s) Oral once PRN  dextrose Oral Gel 15 Gram(s) Oral once PRN  epoetin reilly-epbx (RETACRIT) Injectable 66981 Unit(s) IV Push <User Schedule>  ferrous    sulfate Liquid 300 milliGRAM(s) Enteral Tube daily  glucagon  Injectable 1 milliGRAM(s) IntraMuscular once  hydrALAZINE 100 milliGRAM(s) Oral three times a day  insulin glargine Injectable (LANTUS) 5 Unit(s) SubCutaneous every morning  insulin lispro (ADMELOG) corrective regimen sliding scale   SubCutaneous three times a day before meals  lidocaine   4% Patch 1 Patch Transdermal every 24 hours  lidocaine   4% Patch 1 Patch Transdermal every 24 hours  melatonin 3 milliGRAM(s) Oral at bedtime PRN  methyl salicylate 15%/menthol 10% Topical Cream 1 Application(s) Topical <User Schedule>  modafinil 50 milliGRAM(s) Oral <User Schedule>  Nephro-noam 1 Tablet(s) Oral daily  pantoprazole   Suspension 40 milliGRAM(s) Enteral Tube two times a day  piperacillin/tazobactam IVPB.. 3.375 Gram(s) IV Intermittent every 12 hours  polyethylene glycol 3350 17 Gram(s) Oral daily  QUEtiapine 12.5 milliGRAM(s) Oral at bedtime  senna 2 Tablet(s) Oral at bedtime      T(C): 36.8 (07-03-24 @ 20:04), Max: 36.8 (07-03-24 @ 13:25)  HR: 56 (07-04-24 @ 06:20) (53 - 67)  BP: 151/62 (07-04-24 @ 06:20) (124/55 - 151/62)  RR: 15 (07-04-24 @ 04:07) (14 - 16)  SpO2: 97% (07-04-24 @ 04:07) (97% - 100%)    In NAD  HEENT- EOMI  Heart- Well Perfused  Lungs- No resp distress, trach in place, coughing up phegm from time to time but less than previously  Neuro- Exam unchanged  Psych- Affect wnl                            8.9    8.50  )-----------( 274      ( 03 Jul 2024 13:30 )             28.1     07-03    136  |  97  |  38<H>  ----------------------------<  187<H>  3.7   |  30  |  5.97<H>    Ca    8.6      03 Jul 2024 13:30  Phos  4.4     07-03        Urinalysis Basic - ( 03 Jul 2024 13:30 )    Color: x / Appearance: x / SG: x / pH: x  Gluc: 187 mg/dL / Ketone: x  / Bili: x / Urobili: x   Blood: x / Protein: x / Nitrite: x   Leuk Esterase: x / RBC: x / WBC x   Sq Epi: x / Non Sq Epi: x / Bacteria: x        Imp: Patient with diagnosis of     CVA, Encephalopathy     admitted for comprehensive acute rehabilitation.    Plan:  - Continue therapies  - DVT prophylaxis  - Skin- Turn q2h, check skin daily  - Continue current medications; patient medically stable.   - Patient is stable to continue current rehabilitation program.   - Pulmonology notes from 7/3/24 reviewed:  "Plan   Finish 5-7 Day course of antibiotics  daily capping trials  CXR today improved  if tolerate capping tom and friday plan to decanulate friday  n/c o2 if sat < 92% on cap"    - Nephrology note from 7/3/24 reviewed:  "ESRD on HD  S/p complicated hospital course as per HPI  S/p CVA, trach, PEG  HD MWF  Epoetin w/HD 3 x week  Bld work w/each HD   Rehab"

## 2024-07-04 NOTE — PROGRESS NOTE ADULT - ASSESSMENT
From primary team notes:  73-year-old male w/ past medical history hypertension, ESRD (HD MWF)  insulin-dependent DM presented 4/29/24 to [facility] 4/29 with hiccups and demand ischemia, was treated with baclofen, developed what appears to be toxic encephalopathy. He then sustained a prolonged, complex hospital course over approx 6 weeks, notable for acute respiratory failure, intubation, sepsis, CRRT, DVT, GIB, trach and PEG placement, He is admitted for acute multidisciplinary rehab on 6/14/24 to [facility].     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  #Hospital Acquired Debility   #Dysphagia  #Gait, ADL, Functional impairments  - Comprehensive Multidisciplinary Rehab, PT/OT/ SLP 3 hr/day 5d/wk  - AC: Eliquis 5 BID for DVT, seen on CTA/P 5/12  - ASA81 qD  - Pain: PRN Tylenol, Lidocaine patch   - Absolute Avoidance of Baclofen   - NPO/Tube Feeds as below    #Acute Hypoxemic, Hypercapnic Respiratory Failure, s/p ETT intubation  #s/p tracheostomy   - Albuterol neb q6h   - Continue TC w/ ATC  secretions less- slept with tessalon perle  trach capped-sats 100%  spoke with pulmonary- cap x 2 days then pull trach if sats OK  MBS to advance liquids once trach pulled      #ESRD on HD  #Fistula stenosis   #Anemia   - Continue HD M/W/F per renal   - Access is RUE AVF  - Regular monitoring of electrolytes  - EPOGEN w/ HD  - Iron supplementation, 300 mg daily   HD friday    #HTN  - Coreg 25 q12h  - Hydralazine 100 mg TID  - Norvasc 10 mg/D  - Isordil would be next agent added  BPs Stable    #NSTEMI, Demand Ischemia, resolved  - Initially, troponins mildly elevated  - TTE (4/30): EF 72, normal, no regional wall motion abnormalities   - No need to pursue cath at this time   - 20 mg lipitor at bedtime     #IDDM2, A1c 6.9  - NPH 4u q6h  - FS, ISS TIDAC  Glucoses OK with coverage    #R Common Iliac DVT  - Initially treated with heparin but then developed questionable GIB, transitioned to Eliquis   - IR was consulted, no plan for IVC filter  - Continue Eliquis 5 mg BID     #GI  - Concern melena 5/27, resolved, transfused   - GI consulted, not candidate for endoscopy due to surgical risk  - Concern for upper GI Bleed, GI felt not true bleed  - Continue PPI BID   - MIralax 17g daily  - Senna 2 @ bedtime   - Ducolax suppository daily PRN   last BM 7/2    #Oropharyngeal Dysphagia  #s/p PEG (5/17)  - Failed green dye 6/1   - Continue feeds: Card Steady 10 cc/hr incr. q6 goal 45cc/hr - feeds changed to Bolus  MBS 6/20- cleared for minced moist with mild thicks  supplement PO with G feeds if needed  speech advanced diet to soft with mild thicks  for MBS once trach pulled      #Mood / Cognition:  - Neuropsych evaluation given prolonged and complex hospitalization  - Chart mentions concern for depression w/ possible suicidal ideation   no further hallucinations  took Seroquel last night- claims poor sleep-  provigil in AM only    #/Bladder:  - Check urinary status on arrival, possibly anuric   spont voiding    #Dysphagia:    - Diet: tube feeds/ soft with mild thicks  SP MBS  - Ongoing SLP assessment- speech advanced diet   - Nutrition to follow  OOB For ALL MEALS    #Skin/ Pressure Injury Prevention:  - Assessment on admission   - Incisions:    #Precautions/ Restrictions  - Falls, Aspiration Precautions   - COVID PCR:     50 total minutes spent on today's encounter including team conference as well as phone conversation with patient's wife Pauline - I updated her on plan of care- All questions were answered by me as well.

## 2024-07-04 NOTE — PROGRESS NOTE ADULT - SUBJECTIVE AND OBJECTIVE BOX
Follow-up Pulmonary Progress Note  Chief Complaint : Cerebral infarction        pt seen and examined  comfortable  tolerating cap  mild oral secretions  + Cough      Allergies :No Known Allergies      PAST MEDICAL & SURGICAL HISTORY:  Diabetes    Benign essential HTN    HLD (hyperlipidemia)    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    ESRD on hemodialysis    Arteriovenous fistula        Medications:  MEDICATIONS  (STANDING):  albuterol    0.083% 2.5 milliGRAM(s) Nebulizer every 6 hours  amLODIPine   Tablet 10 milliGRAM(s) Oral daily  apixaban 5 milliGRAM(s) Enteral Tube two times a day  aspirin  chewable 81 milliGRAM(s) Oral daily  atorvastatin 20 milliGRAM(s) Oral at bedtime  benzonatate 100 milliGRAM(s) Oral <User Schedule>  carvedilol 25 milliGRAM(s) Oral every 12 hours  chlorhexidine 2% Cloths 1 Application(s) Topical <User Schedule>  dextrose 10% Bolus 125 milliLiter(s) IV Bolus once  dextrose 5%. 1000 milliLiter(s) (50 mL/Hr) IV Continuous <Continuous>  dextrose 5%. 1000 milliLiter(s) (100 mL/Hr) IV Continuous <Continuous>  dextrose 50% Injectable 12.5 Gram(s) IV Push once  dextrose 50% Injectable 25 Gram(s) IV Push once  epoetin reilly-epbx (RETACRIT) Injectable 12897 Unit(s) IV Push <User Schedule>  ferrous    sulfate Liquid 300 milliGRAM(s) Enteral Tube daily  glucagon  Injectable 1 milliGRAM(s) IntraMuscular once  hydrALAZINE 100 milliGRAM(s) Oral three times a day  insulin glargine Injectable (LANTUS) 5 Unit(s) SubCutaneous every morning  insulin lispro (ADMELOG) corrective regimen sliding scale   SubCutaneous three times a day before meals  lidocaine   4% Patch 1 Patch Transdermal every 24 hours  lidocaine   4% Patch 1 Patch Transdermal every 24 hours  methyl salicylate 15%/menthol 10% Topical Cream 1 Application(s) Topical <User Schedule>  modafinil 50 milliGRAM(s) Oral <User Schedule>  Nephro-noam 1 Tablet(s) Oral daily  pantoprazole   Suspension 40 milliGRAM(s) Enteral Tube two times a day  piperacillin/tazobactam IVPB.. 3.375 Gram(s) IV Intermittent every 12 hours  polyethylene glycol 3350 17 Gram(s) Oral daily  QUEtiapine 12.5 milliGRAM(s) Oral at bedtime  senna 2 Tablet(s) Oral at bedtime    MEDICATIONS  (PRN):  acetaminophen   Oral Liquid .. 650 milliGRAM(s) Oral every 6 hours PRN Moderate Pain (4 - 6)  bisacodyl Suppository 10 milliGRAM(s) Rectal daily PRN Constipation  dextrose Oral Gel 15 Gram(s) Oral once PRN Blood Glucose LESS THAN 70 milliGRAM(s)/deciliter  dextrose Oral Gel 15 Gram(s) Oral once PRN Blood Glucose LESS THAN 70 milliGRAM(s)/deciliter  melatonin 3 milliGRAM(s) Oral at bedtime PRN Insomnia      Antibiotics History  piperacillin/tazobactam IVPB.. 3.375 Gram(s) IV Intermittent every 12 hours, 06-29-24 @ 22:03, Stop order after: 7 Days      Heme Medications   apixaban 5 milliGRAM(s) Enteral Tube two times a day, 06-14-24 @ 18:29  aspirin  chewable 81 milliGRAM(s) Oral daily, 06-15-24 @ 00:00      GI Medications  bisacodyl Suppository 10 milliGRAM(s) Rectal daily, 06-14-24 @ 18:30, Routine PRN  pantoprazole   Suspension 40 milliGRAM(s) Enteral Tube two times a day, 06-14-24 @ 21:14, Routine  polyethylene glycol 3350 17 Gram(s) Oral daily, 06-15-24 @ 00:00, Routine  senna 2 Tablet(s) Oral at bedtime, 06-14-24 @ 18:29, Routine        LABS:                        8.9    8.50  )-----------( 274      ( 03 Jul 2024 13:30 )             28.1     07-03    136  |  97  |  38<H>  ----------------------------<  187<H>  3.7   |  30  |  5.97<H>    Ca    8.6      03 Jul 2024 13:30  Phos  4.4     07-03           CULTURES: (if applicable)    Culture - Sputum (collected 06-30-24 @ 09:00)  Source: .Sputum Sputum  Gram Stain (06-30-24 @ 23:39):    Numerous polymorphonuclear leukocytes per low power field    Few Squamous epithelial cells per low power field    Few Gram Negative Rods per oil power field    Few Gram positive cocci in pairs per oil power field  Final Report (07-02-24 @ 14:20):    Normal Respiratory Jocelyn present    Culture - Blood (collected 06-30-24 @ 00:00)  Source: .Blood Blood-Peripheral  Preliminary Report (07-04-24 @ 11:01):    No growth at 4 days    Culture - Blood (collected 06-30-24 @ 00:00)  Source: .Blood Blood-Peripheral  Preliminary Report (07-04-24 @ 11:01):    No growth at 4 days      Rapid RVP Result: NotDetec (06-29-24 @ 22:30)           VITALS:  T(C): 36.8 (07-04-24 @ 08:58), Max: 36.8 (07-03-24 @ 20:04)  T(F): 98.3 (07-04-24 @ 08:58), Max: 98.3 (07-04-24 @ 08:58)  HR: 64 (07-04-24 @ 14:50) (56 - 67)  BP: 103/58 (07-04-24 @ 14:50) (103/58 - 151/62)  BP(mean): --  ABP: --  ABP(mean): --  RR: 16 (07-04-24 @ 08:58) (14 - 16)  SpO2: 97% (07-04-24 @ 08:58) (97% - 99%)  CVP(mm Hg): --  CVP(cm H2O): --    Ins and Outs     07-03-24 @ 07:01  -  07-04-24 @ 07:00  --------------------------------------------------------  IN: 437 mL / OUT: 0 mL / NET: 437 mL         I&O's Detail    03 Jul 2024 07:01  -  04 Jul 2024 07:00  --------------------------------------------------------  IN:    Free Water: 200 mL    Nepro: 237 mL  Total IN: 437 mL    OUT:  Total OUT: 0 mL    Total NET: 437 mL

## 2024-07-05 ENCOUNTER — TRANSCRIPTION ENCOUNTER (OUTPATIENT)
Age: 74
End: 2024-07-05

## 2024-07-05 LAB
CULTURE RESULTS: SIGNIFICANT CHANGE UP
CULTURE RESULTS: SIGNIFICANT CHANGE UP
GLUCOSE BLDC GLUCOMTR-MCNC: 163 MG/DL — HIGH (ref 70–99)
GLUCOSE BLDC GLUCOMTR-MCNC: 178 MG/DL — HIGH (ref 70–99)
GLUCOSE BLDC GLUCOMTR-MCNC: 222 MG/DL — HIGH (ref 70–99)
GLUCOSE BLDC GLUCOMTR-MCNC: 86 MG/DL — SIGNIFICANT CHANGE UP (ref 70–99)
SPECIMEN SOURCE: SIGNIFICANT CHANGE UP
SPECIMEN SOURCE: SIGNIFICANT CHANGE UP

## 2024-07-05 PROCEDURE — 99232 SBSQ HOSP IP/OBS MODERATE 35: CPT | Mod: GC

## 2024-07-05 PROCEDURE — 99232 SBSQ HOSP IP/OBS MODERATE 35: CPT

## 2024-07-05 RX ORDER — FERROUS SULFATE 325(65) MG
1 TABLET ORAL
Qty: 30 | Refills: 0
Start: 2024-07-05 | End: 2024-08-03

## 2024-07-05 RX ORDER — INSULIN GLARGINE 100 [IU]/ML
5 INJECTION, SOLUTION SUBCUTANEOUS
Qty: 1 | Refills: 0
Start: 2024-07-05 | End: 2024-08-03

## 2024-07-05 RX ORDER — ASPIRIN 325 MG/1
1 TABLET, FILM COATED ORAL
Qty: 0 | Refills: 0 | DISCHARGE
Start: 2024-07-05

## 2024-07-05 RX ORDER — POLYETHYLENE GLYCOL 3350 1 G/G
17 POWDER ORAL
Qty: 0 | Refills: 0 | DISCHARGE
Start: 2024-07-05

## 2024-07-05 RX ORDER — ISOPROPYL ALCOHOL, BENZOCAINE .7; .06 ML/ML; ML/ML
0 SWAB TOPICAL
Qty: 100 | Refills: 1
Start: 2024-07-05

## 2024-07-05 RX ORDER — HYDRALAZINE HYDROCHLORIDE 50 MG/1
1 TABLET ORAL
Qty: 90 | Refills: 0
Start: 2024-07-05 | End: 2024-08-03

## 2024-07-05 RX ORDER — SENNOSIDES 8.6 MG
2 TABLET ORAL
Qty: 0 | Refills: 0 | DISCHARGE
Start: 2024-07-05

## 2024-07-05 RX ORDER — AMLODIPINE BESYLATE 2.5 MG/1
1 TABLET ORAL
Qty: 30 | Refills: 0
Start: 2024-07-05 | End: 2024-08-03

## 2024-07-05 RX ORDER — CARVEDILOL PHOSPHATE 80 MG/1
1 CAPSULE, EXTENDED RELEASE ORAL
Qty: 60 | Refills: 0
Start: 2024-07-05 | End: 2024-08-03

## 2024-07-05 RX ORDER — ATORVASTATIN CALCIUM 20 MG/1
1 TABLET, FILM COATED ORAL
Qty: 30 | Refills: 0
Start: 2024-07-05 | End: 2024-08-03

## 2024-07-05 RX ORDER — PANTOPRAZOLE SODIUM 40 MG/10ML
1 INJECTION, POWDER, FOR SOLUTION INTRAVENOUS
Qty: 30 | Refills: 0
Start: 2024-07-05 | End: 2024-08-03

## 2024-07-05 RX ORDER — APIXABAN 5 MG/1
1 TABLET, FILM COATED ORAL
Qty: 60 | Refills: 0
Start: 2024-07-05 | End: 2024-08-03

## 2024-07-05 RX ADMIN — Medication 1 APPLICATION(S): at 07:28

## 2024-07-05 RX ADMIN — Medication 2.5 MILLIGRAM(S): at 08:34

## 2024-07-05 RX ADMIN — INSULIN GLARGINE 5 UNIT(S): 100 INJECTION, SOLUTION SUBCUTANEOUS at 08:41

## 2024-07-05 RX ADMIN — Medication 300 MILLIGRAM(S): at 12:25

## 2024-07-05 RX ADMIN — Medication 1 TABLET(S): at 12:25

## 2024-07-05 RX ADMIN — PANTOPRAZOLE SODIUM 40 MILLIGRAM(S): 40 INJECTION, POWDER, FOR SOLUTION INTRAVENOUS at 17:23

## 2024-07-05 RX ADMIN — APIXABAN 5 MILLIGRAM(S): 5 TABLET, FILM COATED ORAL at 17:22

## 2024-07-05 RX ADMIN — MENTHOL, METHYL SALICYLATE 1 APPLICATION(S): 63; 210 PATCH PERCUTANEOUS; TOPICAL; TRANSDERMAL at 14:57

## 2024-07-05 RX ADMIN — PANTOPRAZOLE SODIUM 40 MILLIGRAM(S): 40 INJECTION, POWDER, FOR SOLUTION INTRAVENOUS at 06:27

## 2024-07-05 RX ADMIN — LIDOCAINE HCL 1 PATCH: 28 GEL TOPICAL at 07:28

## 2024-07-05 RX ADMIN — CARVEDILOL PHOSPHATE 25 MILLIGRAM(S): 80 CAPSULE, EXTENDED RELEASE ORAL at 06:28

## 2024-07-05 RX ADMIN — MODAFINIL 50 MILLIGRAM(S): 100 TABLET ORAL at 06:28

## 2024-07-05 RX ADMIN — HYDRALAZINE HYDROCHLORIDE 100 MILLIGRAM(S): 50 TABLET ORAL at 21:35

## 2024-07-05 RX ADMIN — Medication 12.5 MILLIGRAM(S): at 21:35

## 2024-07-05 RX ADMIN — HYDRALAZINE HYDROCHLORIDE 100 MILLIGRAM(S): 50 TABLET ORAL at 06:27

## 2024-07-05 RX ADMIN — ASPIRIN 81 MILLIGRAM(S): 325 TABLET, FILM COATED ORAL at 12:25

## 2024-07-05 RX ADMIN — APIXABAN 5 MILLIGRAM(S): 5 TABLET, FILM COATED ORAL at 06:28

## 2024-07-05 RX ADMIN — INSULIN LISPRO 2: 100 INJECTION, SOLUTION SUBCUTANEOUS at 17:36

## 2024-07-05 RX ADMIN — Medication 2.5 MILLIGRAM(S): at 15:00

## 2024-07-05 RX ADMIN — CARVEDILOL PHOSPHATE 25 MILLIGRAM(S): 80 CAPSULE, EXTENDED RELEASE ORAL at 17:23

## 2024-07-05 RX ADMIN — HYDRALAZINE HYDROCHLORIDE 100 MILLIGRAM(S): 50 TABLET ORAL at 14:18

## 2024-07-05 RX ADMIN — Medication 2.5 MILLIGRAM(S): at 20:22

## 2024-07-05 RX ADMIN — INSULIN LISPRO 1: 100 INJECTION, SOLUTION SUBCUTANEOUS at 12:39

## 2024-07-05 RX ADMIN — MENTHOL, METHYL SALICYLATE 1 APPLICATION(S): 63; 210 PATCH PERCUTANEOUS; TOPICAL; TRANSDERMAL at 07:28

## 2024-07-05 RX ADMIN — LIDOCAINE HCL 1 PATCH: 28 GEL TOPICAL at 17:24

## 2024-07-05 RX ADMIN — POLYETHYLENE GLYCOL 3350 17 GRAM(S): 1 POWDER ORAL at 12:25

## 2024-07-05 RX ADMIN — PIPERACILLIN SODIUM AND TAZOBACTAM SODIUM 25 GRAM(S): 3; .375 INJECTION, POWDER, LYOPHILIZED, FOR SOLUTION INTRAVENOUS at 06:27

## 2024-07-05 RX ADMIN — ATORVASTATIN CALCIUM 20 MILLIGRAM(S): 20 TABLET, FILM COATED ORAL at 21:35

## 2024-07-05 RX ADMIN — LIDOCAINE HCL 1 PATCH: 28 GEL TOPICAL at 19:30

## 2024-07-05 RX ADMIN — AMLODIPINE BESYLATE 10 MILLIGRAM(S): 2.5 TABLET ORAL at 06:27

## 2024-07-05 RX ADMIN — LIDOCAINE HCL 1 PATCH: 28 GEL TOPICAL at 19:29

## 2024-07-05 NOTE — DISCHARGE NOTE PROVIDER - NSDCMRMEDTOKEN_GEN_ALL_CORE_FT
alcohol swabs: Apply topically to affected area 4 times a day  amLODIPine 10 mg oral tablet: 1 tab(s) orally once a day  apixaban 5 mg oral tablet: 1 tab(s) orally 2 times a day  aspirin 81 mg oral tablet, chewable: 1 tab(s) orally once a day  atorvastatin 20 mg oral tablet: 1 tab(s) orally once a day (at bedtime)  carvedilol 25 mg oral tablet: 1 tab(s) orally every 12 hours  FeroSul 325 mg (65 mg elemental iron) oral tablet: 1 tab(s) orally once a day  glucometer (per patient&#x27;s insurance): Test blood sugars four times a day. Dispense #1 glucometer.  glucose tablets: Follow instructions on bottle when sugar is low.  hydrALAZINE 100 mg oral tablet: 1 tab(s) orally 3 times a day  Insulin Pen Needles, 4mm: 1 application subcutaneously 4 times a day. ** Use with insulin pen **  lancets: 1 application subcutaneously 4 times a day  Lantus Solostar Pen 100 units/mL subcutaneous solution: 5 unit(s) subcutaneous once a day (at bedtime)  multivitamin: 1 tab(s) orally once a day  pantoprazole 40 mg oral delayed release tablet: 1 tab(s) orally once a day  polyethylene glycol 3350 oral powder for reconstitution: 17 gram(s) orally once a day  senna leaf extract oral tablet: 2 tab(s) orally once a day (at bedtime)  test strips (per patient&#x27;s insurance): 1 application subcutaneously 4 times a day. ** Compatible with patient&#x27;s glucometer **   Albuterol (Eqv-ProAir HFA) 90 mcg/inh inhalation aerosol: 2 puff(s) inhaled every 6 hours  amLODIPine 10 mg oral tablet: 1 tab(s) orally once a day  apixaban 5 mg oral tablet: 1 tab(s) orally 2 times a day  aspirin 81 mg oral tablet, chewable: 1 tab(s) orally once a day  atorvastatin 20 mg oral tablet: 1 tab(s) orally once a day (at bedtime)  carvedilol 25 mg oral tablet: 1 tab(s) orally every 12 hours  FeroSul 325 mg (65 mg elemental iron) oral tablet: 1 tab(s) orally once a day  hydrALAZINE 100 mg oral tablet: 1 tab(s) orally 3 times a day  Lantus Solostar Pen 100 units/mL subcutaneous solution: 5 unit(s) subcutaneous once a day (at bedtime)  multivitamin: 1 tab(s) orally once a day  pantoprazole 40 mg oral delayed release tablet: 1 tab(s) orally once a day  polyethylene glycol 3350 oral powder for reconstitution: 17 gram(s) orally once a day  senna leaf extract oral tablet: 2 tab(s) orally once a day (at bedtime)

## 2024-07-05 NOTE — DISCHARGE NOTE PROVIDER - DETAILS OF MALNUTRITION DIAGNOSIS/DIAGNOSES
This patient has been assessed with a concern for Malnutrition and was treated during this hospitalization for the following Nutrition diagnosis/diagnoses:     -  06/15/2024: Severe protein-calorie malnutrition   -  06/15/2024: Underweight (BMI < 19)

## 2024-07-05 NOTE — DISCHARGE NOTE PROVIDER - NSDCCPCAREPLAN_GEN_ALL_CORE_FT
PRINCIPAL DISCHARGE DIAGNOSIS  Diagnosis: Debility  Assessment and Plan of Treatment: Off baclofen now, no hiccups. Continue TF per PEG as needed, strict aspiration precautions. Continue therapy.      SECONDARY DISCHARGE DIAGNOSES  Diagnosis: ESRD on dialysis  Assessment and Plan of Treatment: Follow up renal as prior.    Diagnosis: Diabetes mellitus with hyperglycemia  Assessment and Plan of Treatment: Lantus 5Units at night, diabetic diet. Follow up PCP. Check blood glucose in am prior breakfast.    Diagnosis: CVA (cerebrovascular accident)  Assessment and Plan of Treatment: Continue eliquis and GI prophylaxis with Protonix. Follow up Neurology.    Diagnosis: GI bleeding  Assessment and Plan of Treatment: Continue Protonix, continue iron. Follow up PCP.     PRINCIPAL DISCHARGE DIAGNOSIS  Diagnosis: Debility  Assessment and Plan of Treatment: Off baclofen now, no hiccups. Continue TF per PEG as supplement if patient not eating orally, strict aspiration precautions. Continue therapy.      SECONDARY DISCHARGE DIAGNOSES  Diagnosis: CVA (cerebrovascular accident)  Assessment and Plan of Treatment: Continue eliquis and GI prophylaxis with Protonix. Follow up Neurology.    Diagnosis: ESRD on dialysis  Assessment and Plan of Treatment: Follow up renal as prior.    Diagnosis: Diabetes mellitus with hyperglycemia  Assessment and Plan of Treatment: Lantus 5Units at night, diabetic diet. Follow up PCP. Check blood glucose in am prior breakfast.    Diagnosis: Acute respiratory failure with hypoxia  Assessment and Plan of Treatment: tracheostomy removed- stoma will close on its own- follow with PCP/pulmonary as OPD    Diagnosis: GI bleeding  Assessment and Plan of Treatment: Continue Protonix, continue iron. Follow up PCP. See GI once tolerating reg diet with thins to Dc PEG?

## 2024-07-05 NOTE — PROGRESS NOTE ADULT - ASSESSMENT
Physical Exam   GENERAL:               Alert,  No acute distress.    HEENT:                   No JVD, Dry MM + trach with no secretion   PULM:                     Bilateral air entry, diminished to auscultation bilaterally, no significant sputum production, No Rales, No Rhonchi, No Wheezing  CVS:                         S1, S2,  No Murmur  ABD:                        Soft, nondistended, nontender, normoactive bowel sounds, + PEG   NEURO:                  Alert,  interactive,  follows commands  PSYC:                      Calm,      Assessment  1. Chronic Respiratory failure s/p Trach  2. AMS appears to have improved , ? Baclofen toxicity vs CVA  3. ESRD on HD, DM 2,   4. Right iliac DVT on Eliquis      Plan   Off antibiotics  decannulated today  alyven dressing placed    n/c o2 if sat < 92% on cap      trach care     continue Eliquis for DVT   will follow   d/w bedside  RN  d/w wife bedside ,

## 2024-07-05 NOTE — PROGRESS NOTE ADULT - SUBJECTIVE AND OBJECTIVE BOX
NEPHROLOGY PROGRESS NOTE    CHIEF COMPLAINT:  ESRD    HPI:  No complaints    EXAM:  Vital Signs Last 24 Hrs  T(C): 36.8 (05 Jul 2024 07:42), Max: 36.8 (04 Jul 2024 21:38)  T(F): 98.3 (05 Jul 2024 07:42), Max: 98.3 (04 Jul 2024 21:38)  HR: 56 (05 Jul 2024 08:34) (55 - 64)  BP: 125/64 (05 Jul 2024 07:42) (103/58 - 150/74)  BP(mean): --  RR: 16 (05 Jul 2024 07:42) (14 - 16)  SpO2: 97% (05 Jul 2024 08:34) (97% - 100%)    Parameters below as of 05 Jul 2024 08:34  Patient On (Oxygen Delivery Method): tracheostomy collar      Conversant, in no apparent distress  Normal respiratory effort, lungs clear bilaterally  Heart RRR with no murmur, no peripheral edema    LABS                        8.9    8.50  )-----------( 274      ( 03 Jul 2024 13:30 )             28.1     07-03    136  |  97  |  38<H>  ----------------------------<  187<H>  3.7   |  30  |  5.97<H>    Ca    8.6      03 Jul 2024 13:30  Phos  4.4     07-03      Impression:  1.  ESRD on hemodialysis MWF  2.  S/p CVA, trache, PEG    Recommend:  Dialysis rescheduled to tomorrow for staffing reasons then back on MWF schedule next week

## 2024-07-05 NOTE — DISCHARGE NOTE PROVIDER - PROVIDER TOKENS
PROVIDER:[TOKEN:[[id number]]],PROVIDER:[TOKEN:[[id number]]],PROVIDER:[TOKEN:[[id number]]],PROVIDER:[TOKEN:[[id number]]],PROVIDER:[TOKEN:[[id number]]],PROVIDER:[TOKEN:[[id number]]],PROVIDER:[TOKEN:[[id number]]]

## 2024-07-05 NOTE — PROGRESS NOTE ADULT - SUBJECTIVE AND OBJECTIVE BOX
Patient is a 73y old  Male who presents with a chief complaint of Rehab (04 Jul 2024 15:09)      SUBJECTIVE / OVERNIGHT EVENTS:  Pt seen and examined at bedside. No acute events overnight.    Allergies    No Known Allergies    Intolerances        MEDICATIONS  (STANDING):  albuterol    0.083% 2.5 milliGRAM(s) Nebulizer every 6 hours  amLODIPine   Tablet 10 milliGRAM(s) Oral daily  apixaban 5 milliGRAM(s) Enteral Tube two times a day  aspirin  chewable 81 milliGRAM(s) Oral daily  atorvastatin 20 milliGRAM(s) Oral at bedtime  benzonatate 100 milliGRAM(s) Oral <User Schedule>  carvedilol 25 milliGRAM(s) Oral every 12 hours  chlorhexidine 2% Cloths 1 Application(s) Topical <User Schedule>  dextrose 10% Bolus 125 milliLiter(s) IV Bolus once  dextrose 5%. 1000 milliLiter(s) (100 mL/Hr) IV Continuous <Continuous>  dextrose 5%. 1000 milliLiter(s) (50 mL/Hr) IV Continuous <Continuous>  dextrose 50% Injectable 25 Gram(s) IV Push once  dextrose 50% Injectable 12.5 Gram(s) IV Push once  epoetin reilly-epbx (RETACRIT) Injectable 81368 Unit(s) IV Push <User Schedule>  ferrous    sulfate Liquid 300 milliGRAM(s) Enteral Tube daily  glucagon  Injectable 1 milliGRAM(s) IntraMuscular once  hydrALAZINE 100 milliGRAM(s) Oral three times a day  insulin glargine Injectable (LANTUS) 5 Unit(s) SubCutaneous every morning  insulin lispro (ADMELOG) corrective regimen sliding scale   SubCutaneous three times a day before meals  lidocaine   4% Patch 1 Patch Transdermal every 24 hours  lidocaine   4% Patch 1 Patch Transdermal every 24 hours  methyl salicylate 15%/menthol 10% Topical Cream 1 Application(s) Topical <User Schedule>  modafinil 50 milliGRAM(s) Oral <User Schedule>  Nephro-noam 1 Tablet(s) Oral daily  pantoprazole   Suspension 40 milliGRAM(s) Enteral Tube two times a day  piperacillin/tazobactam IVPB.. 3.375 Gram(s) IV Intermittent every 12 hours  polyethylene glycol 3350 17 Gram(s) Oral daily  QUEtiapine 12.5 milliGRAM(s) Oral at bedtime  senna 2 Tablet(s) Oral at bedtime    MEDICATIONS  (PRN):  acetaminophen   Oral Liquid .. 650 milliGRAM(s) Oral every 6 hours PRN Moderate Pain (4 - 6)  bisacodyl Suppository 10 milliGRAM(s) Rectal daily PRN Constipation  dextrose Oral Gel 15 Gram(s) Oral once PRN Blood Glucose LESS THAN 70 milliGRAM(s)/deciliter  dextrose Oral Gel 15 Gram(s) Oral once PRN Blood Glucose LESS THAN 70 milliGRAM(s)/deciliter  melatonin 3 milliGRAM(s) Oral at bedtime PRN Insomnia      Vital Signs Last 24 Hrs  T(C): 36.8 (05 Jul 2024 07:42), Max: 36.8 (04 Jul 2024 21:38)  T(F): 98.3 (05 Jul 2024 07:42), Max: 98.3 (04 Jul 2024 21:38)  HR: 56 (05 Jul 2024 08:34) (55 - 64)  BP: 125/64 (05 Jul 2024 07:42) (103/58 - 150/74)  BP(mean): --  RR: 16 (05 Jul 2024 07:42) (14 - 16)  SpO2: 97% (05 Jul 2024 08:34) (97% - 100%)    Parameters below as of 05 Jul 2024 08:34  Patient On (Oxygen Delivery Method): tracheostomy collar      CAPILLARY BLOOD GLUCOSE      POCT Blood Glucose.: 178 mg/dL (05 Jul 2024 08:40)  POCT Blood Glucose.: 182 mg/dL (04 Jul 2024 20:40)  POCT Blood Glucose.: 127 mg/dL (04 Jul 2024 16:41)  POCT Blood Glucose.: 219 mg/dL (04 Jul 2024 11:49)    I&O's Summary      PHYSICAL EXAM:  GENERAL: NAD, well-developed  HEAD:  Atraumatic, Normocephalic  NECK: + Trach, Supple, No JVD  CHEST/LUNG: Clear to auscultation bilaterally; No wheeze, nonlabored breathing  HEART: Regular rate and rhythm; No murmurs, rubs, or gallops  ABDOMEN: Soft, Nontender, Nondistended; Bowel sounds present  EXTREMITIES: No clubbing, cyanosis, or edema    LABS:                        8.9    8.50  )-----------( 274      ( 03 Jul 2024 13:30 )             28.1     07-03    136  |  97  |  38<H>  ----------------------------<  187<H>  3.7   |  30  |  5.97<H>    Ca    8.6      03 Jul 2024 13:30  Phos  4.4     07-03            Urinalysis Basic - ( 03 Jul 2024 13:30 )    Color: x / Appearance: x / SG: x / pH: x  Gluc: 187 mg/dL / Ketone: x  / Bili: x / Urobili: x   Blood: x / Protein: x / Nitrite: x   Leuk Esterase: x / RBC: x / WBC x   Sq Epi: x / Non Sq Epi: x / Bacteria: x        RADIOLOGY & ADDITIONAL TESTS:  Results Reviewed:   Imaging Personally Reviewed:  Electrocardiogram Personally Reviewed:    COORDINATION OF CARE:  Care Discussed with Consultants/Other Providers [Y/N]:  Prior or Outpatient Records Reviewed [Y/N]:

## 2024-07-05 NOTE — DISCHARGE NOTE PROVIDER - CARE PROVIDERS DIRECT ADDRESSES
[email or web address]

## 2024-07-05 NOTE — PROGRESS NOTE ADULT - SUBJECTIVE AND OBJECTIVE BOX
73-year-old male w/ past medical history hypertension, ESRD (HD MWF)  insulin-dependent DM presented 4/29/24 to [facility] with hiccups, chest pain of several days duration, admitted for concern demand ischemia. Early on, he was given baclofen for his hiccups, but soon developed AMS with 2 RRTs by 5/2/24, prompting intubation, upgrade to MICU. There was concern that baclofen toxicity was the culprit of his then-encephalopathic state, as patient had also missed a HD session due to a clotted fistula.     In coming days, he was treated empirically with 5 days of zosyn, had a negative CTH and LP, and had an EEG that did not capture a seizure but showed diffuse non-specific cerebral dysfunction - c/w toxic-metabolic encephalopathies. On 5/6, an MRI showed that the patient had sustained R pontine and cerebellar stroke. He was extubated on 5/9, but owing to a challenging extubation became septic w/ B Cereus, treated with Vanc. He had trach placed 5/14, PEG 5/17, then downgraded.     On the floors, he was followed by several services, including Neurology, who mention the strokes were embolic appearing. Subsequent hospital course notable for nonocclusive R common iliac DVT, for which he was started on a heparin drip which was stopped when patient developed an upper GI Bleed, though GI was consulted and felt he did not have an overt bleed. His dialysis fistula site was stenotic, s/p fistulogram, ultimately changed to a RUE AVF 6/5. He was ultimately started on Eliquis 6/7 for the DVT. Medically stable, he is admitted to [facility] on 6/14/24.     ROS/subjective:  patient seen and evaluated bedside  slept well  tolerated soft diet  family training with spouse today  pulmonary to see regarding DC Trach  no dizziness, no headaches, no SOB, No Chest pain, No nausea, no vomiting  no teeth pain  last BM 7/4      MEDICATIONS  (STANDING):  albuterol    0.083% 2.5 milliGRAM(s) Nebulizer every 6 hours  amLODIPine   Tablet 10 milliGRAM(s) Oral daily  apixaban 5 milliGRAM(s) Enteral Tube two times a day  aspirin  chewable 81 milliGRAM(s) Oral daily  atorvastatin 20 milliGRAM(s) Oral at bedtime  benzonatate 100 milliGRAM(s) Oral <User Schedule>  carvedilol 25 milliGRAM(s) Oral every 12 hours  chlorhexidine 2% Cloths 1 Application(s) Topical <User Schedule>  dextrose 10% Bolus 125 milliLiter(s) IV Bolus once  dextrose 5%. 1000 milliLiter(s) (50 mL/Hr) IV Continuous <Continuous>  dextrose 5%. 1000 milliLiter(s) (100 mL/Hr) IV Continuous <Continuous>  dextrose 50% Injectable 12.5 Gram(s) IV Push once  dextrose 50% Injectable 25 Gram(s) IV Push once  epoetin reilly-epbx (RETACRIT) Injectable 15574 Unit(s) IV Push <User Schedule>  ferrous    sulfate Liquid 300 milliGRAM(s) Enteral Tube daily  glucagon  Injectable 1 milliGRAM(s) IntraMuscular once  hydrALAZINE 100 milliGRAM(s) Oral three times a day  insulin glargine Injectable (LANTUS) 5 Unit(s) SubCutaneous every morning  insulin lispro (ADMELOG) corrective regimen sliding scale   SubCutaneous three times a day before meals  lidocaine   4% Patch 1 Patch Transdermal every 24 hours  lidocaine   4% Patch 1 Patch Transdermal every 24 hours  methyl salicylate 15%/menthol 10% Topical Cream 1 Application(s) Topical <User Schedule>  modafinil 50 milliGRAM(s) Oral <User Schedule>  Nephro-noam 1 Tablet(s) Oral daily  pantoprazole   Suspension 40 milliGRAM(s) Enteral Tube two times a day  piperacillin/tazobactam IVPB.. 3.375 Gram(s) IV Intermittent every 12 hours  polyethylene glycol 3350 17 Gram(s) Oral daily  QUEtiapine 12.5 milliGRAM(s) Oral at bedtime  senna 2 Tablet(s) Oral at bedtime    MEDICATIONS  (PRN):  acetaminophen   Oral Liquid .. 650 milliGRAM(s) Oral every 6 hours PRN Moderate Pain (4 - 6)  bisacodyl Suppository 10 milliGRAM(s) Rectal daily PRN Constipation  dextrose Oral Gel 15 Gram(s) Oral once PRN Blood Glucose LESS THAN 70 milliGRAM(s)/deciliter  dextrose Oral Gel 15 Gram(s) Oral once PRN Blood Glucose LESS THAN 70 milliGRAM(s)/deciliter  melatonin 3 milliGRAM(s) Oral at bedtime PRN Insomnia                            8.9    8.50  )-----------( 274      ( 03 Jul 2024 13:30 )             28.1     07-03    136  |  97  |  38<H>  ----------------------------<  187<H>  3.7   |  30  |  5.97<H>    Ca    8.6      03 Jul 2024 13:30  Phos  4.4     07-03      Urinalysis Basic - ( 03 Jul 2024 13:30 )    Color: x / Appearance: x / SG: x / pH: x  Gluc: 187 mg/dL / Ketone: x  / Bili: x / Urobili: x   Blood: x / Protein: x / Nitrite: x   Leuk Esterase: x / RBC: x / WBC x   Sq Epi: x / Non Sq Epi: x / Bacteria: x        CAPILLARY BLOOD GLUCOSE      POCT Blood Glucose.: 178 mg/dL (05 Jul 2024 08:40)  POCT Blood Glucose.: 182 mg/dL (04 Jul 2024 20:40)  POCT Blood Glucose.: 127 mg/dL (04 Jul 2024 16:41)      Vital Signs Last 24 Hrs  T(C): 36.8 (05 Jul 2024 07:42), Max: 36.8 (04 Jul 2024 21:38)  T(F): 98.3 (05 Jul 2024 07:42), Max: 98.3 (04 Jul 2024 21:38)  HR: 56 (05 Jul 2024 08:34) (55 - 64)  BP: 125/64 (05 Jul 2024 07:42) (103/58 - 150/74)  BP(mean): --  RR: 16 (05 Jul 2024 07:42) (14 - 16)  SpO2: 97% (05 Jul 2024 08:34) (97% - 100%)    Parameters below as of 05 Jul 2024 08:34  Patient On (Oxygen Delivery Method): tracheostomy collar      Constitutional - NAD, Comfortable with trach collar- capped- O2 sats 98%  HEENT - NCAT,   Neck - Supple, No limited ROM  Chest - good chest expansion, good respiratory effort coarse BS  Cardio - warm and well perfused,   Abdomen -  Soft, NTND. + peg.   Extremities - No peripheral edema, No calf tenderness. RUE fistula.   Neurologic Exam:                    Cognitive -      Cranial Nerves - No facial asymmetry, Tongue midline, Shoulder shrug intact no facial droop     Motor -                      LEFT    UE - ShAB 4/5, EF 4/5, EE 3/5, WE 4/5,  WNL                    RIGHT UE - ShAB 4/5, EF 4/5, EE 3/5, WE 4/5,  WNL                    LEFT    LE - HF 3/5, KE 4/5, DF 4/5, PF 4/5                    RIGHT LE - HF 3/5, KE 4/5, DF 4/5, PF 4/5        Sensory - Intact to LT bilateral in face, arm and legs.      Reflexes - DTR 1 / 4 biceps symmetric. neg Latham's b/l, No clonus.  Psychiatric - Mood stable, Affect WNL  Skin on admission: no sacral pressure injuries.      IDT in [facility] 7/3  Tentative DC 7/9 to home-  family training on Friday

## 2024-07-05 NOTE — PROGRESS NOTE ADULT - ASSESSMENT
73-year-old male w/ past medical history hypertension, ESRD (HD MWF)  insulin-dependent DM presented 4/29/24 to [facility] 4/29 with hiccups and demand ischemia, was treated with baclofen, developed what appears to be toxic encephalopathy. He then sustained a prolonged, complex hospital course over approx 6 weeks, notable for acute respiratory failure, intubation, sepsis, CRRT, DVT, GIB, trach and PEG placement, He is admitted for acute multidisciplinary rehab on 6/14/24 to [facility].     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  MBS Monday to advance liquids once trach pulled      #ESRD on HD  #Fistula stenosis   #Anemia   - Continue HD M/W/F per renal   - Access is RUE AVF  - Regular monitoring of electrolytes  - EPOGEN w/ HD  - Iron supplementation, 300 mg daily   HD today    #HTN  - Coreg 25 q12h  - Hydralazine 100 mg TID  - Norvasc 10 mg/D  - Isordil would be next agent added  BPs Stable    #NSTEMI, Demand Ischemia, resolved  - Initially, troponins mildly elevated  - TTE (4/30): EF 72, normal, no regional wall motion abnormalities   - No need to pursue cath at this time   - 20 mg lipitor at bedtime     #IDDM2, A1c 6.9  - NPH 4u q6h  - FS, ISS TIDAC  Glucoses OK with coverage    #R Common Iliac DVT  - Initially treated with heparin but then developed questionable GIB, transitioned to Eliquis   - IR was consulted, no plan for IVC filter  - Continue Eliquis 5 mg BID     #GI  - Concern melena 5/27, resolved, transfused   - GI consulted, not candidate for endoscopy due to surgical risk  - Concern for upper GI Bleed, GI felt not true bleed  - Continue PPI BID   - MIralax 17g daily  - Senna 2 @ bedtime   - Ducolax suppository daily PRN   last BM 7/4    #Oropharyngeal Dysphagia  #s/p PEG (5/17)  - Failed green dye 6/1   - Continue feeds: Card Steady 10 cc/hr incr. q6 goal 45cc/hr - feeds changed to Bolus  MBS 6/20- cleared for minced moist with mild thicks  supplement PO with G feeds if needed  speech advanced diet to soft with mild thicks  for MBS once trach pulled      #Mood / Cognition:  - Neuropsych evaluation given prolonged and complex hospitalization  - Chart mentions concern for depression w/ possible suicidal ideation   no further hallucinations  good sleep with seroquel  provigil in AM only    #/Bladder:  - Check urinary status on arrival, possibly anuric   spont voiding    #Dysphagia:    - Diet: tube feeds/ soft with mild thicks  SP MBS- repeat MBS Monday- ? need to DC with PEG feeds to supplement  - Ongoing SLP assessment- speech advanced diet   - Nutrition to follow  OOB For ALL MEALS    #Skin/ Pressure Injury Prevention:  - Assessment on admission   - Incisions:    #Precautions/ Restrictions  - Falls, Aspiration Precautions   - COVID PCR:

## 2024-07-05 NOTE — DISCHARGE NOTE PROVIDER - HOSPITAL COURSE
73-year-old male w/ past medical history hypertension, ESRD (HD MWF)  insulin-dependent DM presented 4/29/24 to [facility] with hiccups, chest pain of several days duration, admitted for concern demand ischemia. Early on, he was given baclofen for his hiccups, but soon developed AMS with 2 RRTs by 5/2/24, prompting intubation, upgrade to MICU. There was concern that baclofen toxicity was the culprit of his then-encephalopathic state, as patient had also missed a HD session due to a clotted fistula.     In coming days, he was treated empirically with 5 days of zosyn, had a negative CTH and LP, and had an EEG that did not capture a seizure but showed diffuse non-specific cerebral dysfunction - c/w toxic-metabolic encephalopathies. On 5/6, an MRI showed that the patient had sustained R pontine and cerebellar stroke. He was extubated on 5/9, but owing to a challenging extubation became septic w/ B Cereus, treated with Vanc. He had trach placed 5/14, PEG 5/17, then downgraded.     On the floors, he was followed by several services, including Neurology, who mention the strokes were embolic appearing. Subsequent hospital course notable for nonocclusive R common iliac DVT, for which he was started on a heparin drip which was stopped when patient developed an upper GI Bleed, though GI was consulted and felt he did not have an overt bleed. His dialysis fistula site was stenotic, s/p fistulogram, ultimately changed to a RUE AVF 6/5. He was ultimately started on Eliquis 6/7 for the DVT. Medically stable, he is admitted to [facility] on 6/14/24.      73-year-old male w/ past medical history hypertension, ESRD (HD MWF)  insulin-dependent DM presented 4/29/24 to [facility] with hiccups, chest pain of several days duration, admitted for concern demand ischemia. Early on, he was given baclofen for his hiccups, but soon developed AMS with 2 RRTs by 5/2/24, prompting intubation, upgrade to MICU. There was concern that baclofen toxicity was the culprit of his then-encephalopathic state, as patient had also missed a HD session due to a clotted fistula.     In coming days, he was treated empirically with 5 days of zosyn, had a negative CTH and LP, and had an EEG that did not capture a seizure but showed diffuse non-specific cerebral dysfunction - c/w toxic-metabolic encephalopathies. On 5/6, an MRI showed that the patient had sustained R pontine and cerebellar stroke. He was extubated on 5/9, but owing to a challenging extubation became septic w/ B Cereus, treated with Vanc. He had trach placed 5/14, PEG 5/17, then downgraded.     On the floors, he was followed by several services, including Neurology, who mention the strokes were embolic appearing. Subsequent hospital course notable for nonocclusive R common iliac DVT, for which he was started on a heparin drip which was stopped when patient developed an upper GI Bleed, though GI was consulted and felt he did not have an overt bleed. His dialysis fistula site was stenotic, s/p fistulogram, ultimately changed to a RUE AVF 6/5. He was ultimately started on Eliquis 6/7 for the DVT. Medically stable, he is admitted to [facility] on 6/14/24.     REhab course as follows- decannulated, followed by speech- advanced to easy to chew with thins via teaspoon only and 3 second bolus hold     73-year-old male w/ past medical history hypertension, ESRD (HD MWF)  insulin-dependent DM presented 4/29/24 to [facility] with hiccups, chest pain of several days duration, admitted for concern demand ischemia. Early on, he was given baclofen for his hiccups, but soon developed AMS with 2 RRTs by 5/2/24, prompting intubation, upgrade to MICU. There was concern that baclofen toxicity was the culprit of his then-encephalopathic state, as patient had also missed a HD session due to a clotted fistula.     In coming days, he was treated empirically with 5 days of zosyn, had a negative CTH and LP, and had an EEG that did not capture a seizure but showed diffuse non-specific cerebral dysfunction - c/w toxic-metabolic encephalopathies. On 5/6, an MRI showed that the patient had sustained R pontine and cerebellar stroke. He was extubated on 5/9, but owing to a challenging extubation became septic w/ B Cereus, treated with Vanc. He had trach placed 5/14, PEG 5/17, then downgraded.     On the floors, he was followed by several services, including Neurology, who mention the strokes were embolic appearing. Subsequent hospital course notable for nonocclusive R common iliac DVT, for which he was started on a heparin drip which was stopped when patient developed an upper GI Bleed, though GI was consulted and felt he did not have an overt bleed. His dialysis fistula site was stenotic, s/p fistulogram, ultimately changed to a RUE AVF 6/5. He was ultimately started on Eliquis 6/7 for the DVT. Medically stable, he is admitted to [facility] on 6/14/24.     REhab course as follows- decannulated, followed by speech- advanced to easy to chew with thins via teaspoon only and 3 second bolus hold.    On 7/10 patient with low O2 saturation, venti mask placed for O2 sats 70s. ICU team consulted and transfer requested for further management. Transferred on 7/10.

## 2024-07-05 NOTE — PROGRESS NOTE ADULT - ASSESSMENT
73M HTN, ESRD (HD MWF via AVG) DM who was admitted for unstable angina. Course complicated by AMS secondary to acute CVA vs Baclofen toxicity requiring intubation s/p Trach and PEG. Also c/b DVT and GIB. Notable course for AV Fistula dysfunction requiring balloon angioplasty.    #Recent AMS suspect due to baclofen toxicity vs CVA  #Acute CVA  - MRI head 5/6 showed acute infarct in left talya and left cerebellum   - continue holding baclofen  - continue ASA and statin  - continue Modafinil    #Acute Hypoxemic, Hypercapnic Respiratory Failure, s/p ETT intubation  #increased secretions  #Aspiration PNA?  - Zosyn q12h for 5 days  - scopolamine patch  - s/p tracheostomy   - Albuterol neb q6h   - pulm following  - oral suction, minimize trach suctioning     #ESRD on HD MWF via RT AFF  - stable, continue HD    #R Common Iliac DVT  - CT AP (5/12) with nonocclusive RIGHT common iliac vein deep venous thrombosis  - Continue Eliquis 5 mg BID     #Hallucination  - Seroquel 12.5mg HS  - Psych following    #HTN  - Coreg 25 q12h  - Hydralazine 100 mg TID  - Norvasc 10 mg/D  - BP acceptable     #DM2   - A1c 6.9  - Lantus 5 qAM, ISS    #Oropharyngeal Dysphagia  - s/p PEG (5/17)  - Continue PO diet and supplement with TF if po intake is poor    DVT PPX: Eliquis

## 2024-07-05 NOTE — DISCHARGE NOTE PROVIDER - CARE PROVIDER_API CALL
Prachi Omer  Internal Medicine  [address], [address]  [place] [address]  Phone: [phone]  Fax: [phone]  Follow Up Time:     Luis F Stallworth  Gastroenterology  [address], [address]  [place] [address]  Phone: [phone]  Fax: [phone]  Follow Up Time:     Geovani Bravo  Internal Medicine  [address] [place] [address]  Phone: [phone]  Fax: [phone]  Follow Up Time:     Adolfo Garcia  Nephrology  [address] [place] [address]  Phone: [phone]  Fax: [phone]  Follow Up Time:     Kellee Mora  Neurology  [address] [facility] [address]  [place] [address]  Phone: [phone]  Fax: [phone]  Follow Up Time:     Jessica Lockett  Vascular Surgery  [address] [facility], [address]  [place] [address]  Phone: [phone]  Fax: [phone]  Follow Up Time:     Chidi Sams  Physical/Rehab Medicine  [address] [place] [address]  Phone: [phone]  Fax: [phone]  Follow Up Time:

## 2024-07-05 NOTE — DISCHARGE NOTE PROVIDER - NSDCFUADDAPPT_GEN_ALL_CORE_FT
[facility]  Pulmonology  [address], [address]  [place] [address]  Phone: [phone]  Fax:     [facility]  Psychiatry  [address] [place] [address]  Phone: [phone]

## 2024-07-05 NOTE — DISCHARGE NOTE PROVIDER - NSDCHHHOMEBOUND_GEN_ALL_CORE
Requires supervison due to deteriorating mental status.../Shortness of breath with minimal ambulation/Stroke/TBI (neurological/cognitive impairment)/Fall risk/Pain greater than 7 on scale of 10 on ambulation

## 2024-07-06 LAB
ANION GAP SERPL CALC-SCNC: 11 MMOL/L — SIGNIFICANT CHANGE UP (ref 5–17)
BUN SERPL-MCNC: 50 MG/DL — HIGH (ref 7–23)
CALCIUM SERPL-MCNC: 8.3 MG/DL — LOW (ref 8.4–10.5)
CHLORIDE SERPL-SCNC: 96 MMOL/L — SIGNIFICANT CHANGE UP (ref 96–108)
CO2 SERPL-SCNC: 28 MMOL/L — SIGNIFICANT CHANGE UP (ref 22–31)
CREAT SERPL-MCNC: 7.63 MG/DL — HIGH (ref 0.5–1.3)
EGFR: 7 ML/MIN/1.73M2 — LOW
GLUCOSE BLDC GLUCOMTR-MCNC: 102 MG/DL — HIGH (ref 70–99)
GLUCOSE BLDC GLUCOMTR-MCNC: 146 MG/DL — HIGH (ref 70–99)
GLUCOSE BLDC GLUCOMTR-MCNC: 167 MG/DL — HIGH (ref 70–99)
GLUCOSE BLDC GLUCOMTR-MCNC: 212 MG/DL — HIGH (ref 70–99)
GLUCOSE SERPL-MCNC: 214 MG/DL — HIGH (ref 70–99)
HCT VFR BLD CALC: 25.4 % — LOW (ref 39–50)
HGB BLD-MCNC: 8.3 G/DL — LOW (ref 13–17)
MCHC RBC-ENTMCNC: 22.9 PG — LOW (ref 27–34)
MCHC RBC-ENTMCNC: 32.7 GM/DL — SIGNIFICANT CHANGE UP (ref 32–36)
MCV RBC AUTO: 70 FL — LOW (ref 80–100)
NRBC # BLD: 0 /100 WBCS — SIGNIFICANT CHANGE UP (ref 0–0)
PHOSPHATE SERPL-MCNC: 5.4 MG/DL — HIGH (ref 2.5–4.5)
PLATELET # BLD AUTO: 305 K/UL — SIGNIFICANT CHANGE UP (ref 150–400)
POTASSIUM SERPL-MCNC: 3.9 MMOL/L — SIGNIFICANT CHANGE UP (ref 3.5–5.3)
POTASSIUM SERPL-SCNC: 3.9 MMOL/L — SIGNIFICANT CHANGE UP (ref 3.5–5.3)
RBC # BLD: 3.63 M/UL — LOW (ref 4.2–5.8)
RBC # FLD: 20.4 % — HIGH (ref 10.3–14.5)
SODIUM SERPL-SCNC: 135 MMOL/L — SIGNIFICANT CHANGE UP (ref 135–145)
WBC # BLD: 7.48 K/UL — SIGNIFICANT CHANGE UP (ref 3.8–10.5)
WBC # FLD AUTO: 7.48 K/UL — SIGNIFICANT CHANGE UP (ref 3.8–10.5)

## 2024-07-06 PROCEDURE — 99232 SBSQ HOSP IP/OBS MODERATE 35: CPT

## 2024-07-06 PROCEDURE — 99233 SBSQ HOSP IP/OBS HIGH 50: CPT

## 2024-07-06 RX ADMIN — APIXABAN 5 MILLIGRAM(S): 5 TABLET, FILM COATED ORAL at 06:03

## 2024-07-06 RX ADMIN — LIDOCAINE HCL 1 PATCH: 28 GEL TOPICAL at 18:23

## 2024-07-06 RX ADMIN — Medication 2.5 MILLIGRAM(S): at 09:01

## 2024-07-06 RX ADMIN — CARVEDILOL PHOSPHATE 25 MILLIGRAM(S): 80 CAPSULE, EXTENDED RELEASE ORAL at 06:02

## 2024-07-06 RX ADMIN — MENTHOL, METHYL SALICYLATE 1 APPLICATION(S): 63; 210 PATCH PERCUTANEOUS; TOPICAL; TRANSDERMAL at 18:18

## 2024-07-06 RX ADMIN — INSULIN GLARGINE 5 UNIT(S): 100 INJECTION, SOLUTION SUBCUTANEOUS at 08:59

## 2024-07-06 RX ADMIN — APIXABAN 5 MILLIGRAM(S): 5 TABLET, FILM COATED ORAL at 18:21

## 2024-07-06 RX ADMIN — ASPIRIN 81 MILLIGRAM(S): 325 TABLET, FILM COATED ORAL at 12:25

## 2024-07-06 RX ADMIN — PANTOPRAZOLE SODIUM 40 MILLIGRAM(S): 40 INJECTION, POWDER, FOR SOLUTION INTRAVENOUS at 06:02

## 2024-07-06 RX ADMIN — MODAFINIL 50 MILLIGRAM(S): 100 TABLET ORAL at 06:02

## 2024-07-06 RX ADMIN — Medication 12.5 MILLIGRAM(S): at 21:09

## 2024-07-06 RX ADMIN — AMLODIPINE BESYLATE 10 MILLIGRAM(S): 2.5 TABLET ORAL at 06:03

## 2024-07-06 RX ADMIN — LIDOCAINE HCL 1 PATCH: 28 GEL TOPICAL at 18:21

## 2024-07-06 RX ADMIN — LIDOCAINE HCL 1 PATCH: 28 GEL TOPICAL at 05:49

## 2024-07-06 RX ADMIN — CARVEDILOL PHOSPHATE 25 MILLIGRAM(S): 80 CAPSULE, EXTENDED RELEASE ORAL at 18:21

## 2024-07-06 RX ADMIN — ERYTHROPOIETIN 10000 UNIT(S): 4000 INJECTION, SOLUTION INTRAVENOUS; SUBCUTANEOUS at 17:30

## 2024-07-06 RX ADMIN — HYDRALAZINE HYDROCHLORIDE 100 MILLIGRAM(S): 50 TABLET ORAL at 21:09

## 2024-07-06 RX ADMIN — ATORVASTATIN CALCIUM 20 MILLIGRAM(S): 20 TABLET, FILM COATED ORAL at 21:08

## 2024-07-06 RX ADMIN — INSULIN LISPRO 2: 100 INJECTION, SOLUTION SUBCUTANEOUS at 12:28

## 2024-07-06 RX ADMIN — Medication 1 APPLICATION(S): at 06:10

## 2024-07-06 RX ADMIN — Medication 1 TABLET(S): at 12:25

## 2024-07-06 RX ADMIN — HYDRALAZINE HYDROCHLORIDE 100 MILLIGRAM(S): 50 TABLET ORAL at 06:02

## 2024-07-06 RX ADMIN — Medication 300 MILLIGRAM(S): at 12:25

## 2024-07-06 RX ADMIN — PANTOPRAZOLE SODIUM 40 MILLIGRAM(S): 40 INJECTION, POWDER, FOR SOLUTION INTRAVENOUS at 18:20

## 2024-07-06 NOTE — PROGRESS NOTE ADULT - SUBJECTIVE AND OBJECTIVE BOX
Cc: CVA, Encephalopathy    HPI: Patient with no new medical issues today.  Pain controlled, no chest pain, no N/V, no Fevers/Chills. No other new ROS  Has been tolerating rehabilitation program.    acetaminophen   Oral Liquid .. 650 milliGRAM(s) Oral every 6 hours PRN  albuterol    0.083% 2.5 milliGRAM(s) Nebulizer every 6 hours  amLODIPine   Tablet 10 milliGRAM(s) Oral daily  apixaban 5 milliGRAM(s) Enteral Tube two times a day  aspirin  chewable 81 milliGRAM(s) Oral daily  atorvastatin 20 milliGRAM(s) Oral at bedtime  benzonatate 100 milliGRAM(s) Oral <User Schedule>  bisacodyl Suppository 10 milliGRAM(s) Rectal daily PRN  carvedilol 25 milliGRAM(s) Oral every 12 hours  chlorhexidine 2% Cloths 1 Application(s) Topical <User Schedule>  dextrose 10% Bolus 125 milliLiter(s) IV Bolus once  dextrose 5%. 1000 milliLiter(s) IV Continuous <Continuous>  dextrose 5%. 1000 milliLiter(s) IV Continuous <Continuous>  dextrose 50% Injectable 25 Gram(s) IV Push once  dextrose 50% Injectable 12.5 Gram(s) IV Push once  dextrose Oral Gel 15 Gram(s) Oral once PRN  dextrose Oral Gel 15 Gram(s) Oral once PRN  epoetin reilly-epbx (RETACRIT) Injectable 30818 Unit(s) IV Push <User Schedule>  ferrous    sulfate Liquid 300 milliGRAM(s) Enteral Tube daily  glucagon  Injectable 1 milliGRAM(s) IntraMuscular once  hydrALAZINE 100 milliGRAM(s) Oral three times a day  insulin glargine Injectable (LANTUS) 5 Unit(s) SubCutaneous every morning  insulin lispro (ADMELOG) corrective regimen sliding scale   SubCutaneous three times a day before meals  lidocaine   4% Patch 1 Patch Transdermal every 24 hours  lidocaine   4% Patch 1 Patch Transdermal every 24 hours  melatonin 3 milliGRAM(s) Oral at bedtime PRN  methyl salicylate 15%/menthol 10% Topical Cream 1 Application(s) Topical <User Schedule>  modafinil 50 milliGRAM(s) Oral <User Schedule>  Nephro-noam 1 Tablet(s) Oral daily  pantoprazole   Suspension 40 milliGRAM(s) Enteral Tube two times a day  polyethylene glycol 3350 17 Gram(s) Oral daily  QUEtiapine 12.5 milliGRAM(s) Oral at bedtime  senna 2 Tablet(s) Oral at bedtime      T(C): 36.7 (07-05-24 @ 19:52), Max: 36.7 (07-05-24 @ 19:52)  HR: 58 (07-06-24 @ 06:00) (55 - 77)  BP: 163/69 (07-06-24 @ 06:00) (129/65 - 163/69)  RR: 16 (07-05-24 @ 19:52) (16 - 16)  SpO2: 98% (07-05-24 @ 19:52) (96% - 98%)    In NAD  HEENT- EOMI  Heart- Well Perfused  Lungs- Trach capped  Neuro- Exam unchanged  Psych- Affect wnl          Imp: Patient with diagnosis of    CVA, Encephalopathy      admitted for comprehensive acute rehabilitation.    Plan:  - Continue therapies  - DVT prophylaxis  - Skin- Turn q2h, check skin daily  - Continue current medications; patient medically stable.   - Patient is stable to continue current rehabilitation program.   - Pulmonology notes from 7/5/24 reviewed:   "1. Chronic Respiratory failure s/p Trach  2. AMS appears to have improved , ? Baclofen toxicity vs CVA  3. ESRD on HD, DM 2,   4. Right iliac DVT on Eliquis      Plan   Off antibiotics  decannulated today  alyven dressing placed    n/c o2 if sat < 92% on cap      trach care     continue Eliquis for DVT   will follow   d/w bedside  RN  d/w wife bedside"

## 2024-07-06 NOTE — PROGRESS NOTE ADULT - SUBJECTIVE AND OBJECTIVE BOX
Subjective: seen in HD. C/o weakness.       MEDICATIONS  (STANDING):  albuterol    0.083% 2.5 milliGRAM(s) Nebulizer every 6 hours  amLODIPine   Tablet 10 milliGRAM(s) Oral daily  apixaban 5 milliGRAM(s) Enteral Tube two times a day  aspirin  chewable 81 milliGRAM(s) Oral daily  atorvastatin 20 milliGRAM(s) Oral at bedtime  benzonatate 100 milliGRAM(s) Oral <User Schedule>  carvedilol 25 milliGRAM(s) Oral every 12 hours  chlorhexidine 2% Cloths 1 Application(s) Topical <User Schedule>  dextrose 10% Bolus 125 milliLiter(s) IV Bolus once  dextrose 5%. 1000 milliLiter(s) (100 mL/Hr) IV Continuous <Continuous>  dextrose 5%. 1000 milliLiter(s) (50 mL/Hr) IV Continuous <Continuous>  dextrose 50% Injectable 25 Gram(s) IV Push once  dextrose 50% Injectable 12.5 Gram(s) IV Push once  epoetin reilly-epbx (RETACRIT) Injectable 84642 Unit(s) IV Push <User Schedule>  ferrous    sulfate Liquid 300 milliGRAM(s) Enteral Tube daily  glucagon  Injectable 1 milliGRAM(s) IntraMuscular once  hydrALAZINE 100 milliGRAM(s) Oral three times a day  insulin glargine Injectable (LANTUS) 5 Unit(s) SubCutaneous every morning  insulin lispro (ADMELOG) corrective regimen sliding scale   SubCutaneous three times a day before meals  lidocaine   4% Patch 1 Patch Transdermal every 24 hours  lidocaine   4% Patch 1 Patch Transdermal every 24 hours  methyl salicylate 15%/menthol 10% Topical Cream 1 Application(s) Topical <User Schedule>  modafinil 50 milliGRAM(s) Oral <User Schedule>  Nephro-noam 1 Tablet(s) Oral daily  pantoprazole   Suspension 40 milliGRAM(s) Enteral Tube two times a day  polyethylene glycol 3350 17 Gram(s) Oral daily  QUEtiapine 12.5 milliGRAM(s) Oral at bedtime  senna 2 Tablet(s) Oral at bedtime    MEDICATIONS  (PRN):  acetaminophen   Oral Liquid .. 650 milliGRAM(s) Oral every 6 hours PRN Moderate Pain (4 - 6)  bisacodyl Suppository 10 milliGRAM(s) Rectal daily PRN Constipation  dextrose Oral Gel 15 Gram(s) Oral once PRN Blood Glucose LESS THAN 70 milliGRAM(s)/deciliter  dextrose Oral Gel 15 Gram(s) Oral once PRN Blood Glucose LESS THAN 70 milliGRAM(s)/deciliter  melatonin 3 milliGRAM(s) Oral at bedtime PRN Insomnia          T(C): 36.7 (07-06-24 @ 08:35), Max: 36.7 (07-05-24 @ 19:52)  HR: 55 (07-06-24 @ 08:35) (55 - 77)  BP: 122/60 (07-06-24 @ 08:35) (122/60 - 163/69)  RR: 16 (07-06-24 @ 08:35) (16 - 16)  SpO2: 97% (07-06-24 @ 08:35) (96% - 98%)  Wt(kg): --        I&O's Detail           PHYSICAL EXAM:    GENERAL: NAD  NECK: Supple, no inc in JVP  CHEST/LUNG: Clear  HEART: S1S2  ABDOMEN: Soft, Nontender, Nondistended; Bowel sounds present  EXTREMITIES:  no edema.   NEURO: no asterixis  R AVF t/b      Impression:  1.  ESRD on hemodialysis MWF. Held yest due to staffing issues.   2.  S/p CVA, trache, PEG    Recommend:  Complete HD today as Rxed. UF goal 1.5-2kg as BP tolerates.

## 2024-07-06 NOTE — PROGRESS NOTE ADULT - ASSESSMENT
73M HTN, ESRD (HD MWF via AVG) DM who was admitted for unstable angina. Course complicated by AMS secondary to acute CVA vs Baclofen toxicity requiring intubation s/p Trach and PEG. Also c/b DVT and GIB. Notable course for AV Fistula dysfunction requiring balloon angioplasty.    #Recent AMS suspect due to baclofen toxicity vs CVA  #Acute CVA  - MRI head 5/6 showed acute infarct in left talya and left cerebellum   - continue holding baclofen  - continue ASA and statin  - continue Modafinil    #Acute Hypoxemic, Hypercapnic Respiratory Failure, s/p ETT intubation  #increased secretions  #Aspiration PNA?  - Zosyn q12h for 5 days  - scopolamine patch  - s/p tracheostomy   - Albuterol neb q6h   - pulm following  - oral suction  - trach decannulated on 7/5    #ESRD on HD MWF via RT AFF  - stable, continue HD    #R Common Iliac DVT  - CT AP (5/12) with nonocclusive RIGHT common iliac vein deep venous thrombosis  - Continue Eliquis 5 mg BID     #Hallucination  - Seroquel 12.5mg HS  - Psych following    #HTN  - Coreg 25 q12h  - Hydralazine 100 mg TID  - Norvasc 10 mg/D  - BP acceptable     #DM2   - A1c 6.9  - Lantus 5 qAM, ISS    #Oropharyngeal Dysphagia  - s/p PEG (5/17)  - Continue PO diet and supplement with TF if po intake is poor    DVT PPX: Eliquis

## 2024-07-06 NOTE — PROGRESS NOTE ADULT - ASSESSMENT
Physical Exam   GENERAL:               Alert,  No acute distress.    HEENT:                   No JVD, Dry MM + trach with no secretion   PULM:                     Bilateral air entry, diminished to auscultation bilaterally, no significant sputum production, No Rales, No Rhonchi, No Wheezing  CVS:                         S1, S2,  No Murmur  ABD:                        Soft, nondistended, nontender, normoactive bowel sounds, + PEG   NEURO:                  Alert,  interactive,  follows commands  PSYC:                      Calm,      Assessment  1. Chronic Respiratory failure s/p Trach s/p decanulation 7/5/24  2. AMS appears to have improved , ? Baclofen toxicity vs CVA  3. ESRD on HD, DM 2,   4. Right iliac DVT on Eliquis      Plan   cover stoma via band aide  monitor secretions if any  monitor on room air       continue Eliquis for DVT   will follow   d/w bedside  RN  d/w wife bedside ,  Physical Exam   GENERAL:               Alert,  No acute distress.    HEENT:                   No JVD, Dry MM stoma closed  PULM:                     Bilateral air entry, diminished to auscultation bilaterally, no significant sputum production, No Rales, No Rhonchi, No Wheezing  CVS:                         S1, S2,  No Murmur  ABD:                        Soft, nondistended, nontender, normoactive bowel sounds, + PEG   NEURO:                  Alert,  interactive,  follows commands  PSYC:                      Calm,      Assessment  1. Chronic Respiratory failure s/p Trach s/p decanulation 7/5/24  2. AMS appears to have improved , ? Baclofen toxicity vs CVA  3. ESRD on HD, DM 2,   4. Right iliac DVT on Eliquis      Plan   cover stoma via band aide  monitor secretions if any  monitor on room air       continue Eliquis for DVT   will follow   d/w bedside  RN  d/w wife bedside ,

## 2024-07-06 NOTE — PROGRESS NOTE ADULT - SUBJECTIVE AND OBJECTIVE BOX
Follow-up Pulmonary Progress Note  Chief Complaint : Cerebral infarction    patient seen and examined  comfortable  denies secretions, despite having suction bedside with mild amount of secretions in it  denies cough, cp, palp, n/v  stoma site is practically closed small pinsized hole    Allergies :No Known Allergies      PAST MEDICAL & SURGICAL HISTORY:  Diabetes    Benign essential HTN    HLD (hyperlipidemia)    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    ESRD on hemodialysis    Arteriovenous fistula        Medications:  MEDICATIONS  (STANDING):  albuterol    0.083% 2.5 milliGRAM(s) Nebulizer every 6 hours  amLODIPine   Tablet 10 milliGRAM(s) Oral daily  apixaban 5 milliGRAM(s) Enteral Tube two times a day  aspirin  chewable 81 milliGRAM(s) Oral daily  atorvastatin 20 milliGRAM(s) Oral at bedtime  benzonatate 100 milliGRAM(s) Oral <User Schedule>  carvedilol 25 milliGRAM(s) Oral every 12 hours  chlorhexidine 2% Cloths 1 Application(s) Topical <User Schedule>  dextrose 10% Bolus 125 milliLiter(s) IV Bolus once  dextrose 5%. 1000 milliLiter(s) (50 mL/Hr) IV Continuous <Continuous>  dextrose 5%. 1000 milliLiter(s) (100 mL/Hr) IV Continuous <Continuous>  dextrose 50% Injectable 12.5 Gram(s) IV Push once  dextrose 50% Injectable 25 Gram(s) IV Push once  epoetin reilly-epbx (RETACRIT) Injectable 46342 Unit(s) IV Push <User Schedule>  ferrous    sulfate Liquid 300 milliGRAM(s) Enteral Tube daily  glucagon  Injectable 1 milliGRAM(s) IntraMuscular once  hydrALAZINE 100 milliGRAM(s) Oral three times a day  insulin glargine Injectable (LANTUS) 5 Unit(s) SubCutaneous every morning  insulin lispro (ADMELOG) corrective regimen sliding scale   SubCutaneous three times a day before meals  lidocaine   4% Patch 1 Patch Transdermal every 24 hours  lidocaine   4% Patch 1 Patch Transdermal every 24 hours  methyl salicylate 15%/menthol 10% Topical Cream 1 Application(s) Topical <User Schedule>  modafinil 50 milliGRAM(s) Oral <User Schedule>  Nephro-noam 1 Tablet(s) Oral daily  pantoprazole   Suspension 40 milliGRAM(s) Enteral Tube two times a day  polyethylene glycol 3350 17 Gram(s) Oral daily  QUEtiapine 12.5 milliGRAM(s) Oral at bedtime  senna 2 Tablet(s) Oral at bedtime    MEDICATIONS  (PRN):  acetaminophen   Oral Liquid .. 650 milliGRAM(s) Oral every 6 hours PRN Moderate Pain (4 - 6)  bisacodyl Suppository 10 milliGRAM(s) Rectal daily PRN Constipation  dextrose Oral Gel 15 Gram(s) Oral once PRN Blood Glucose LESS THAN 70 milliGRAM(s)/deciliter  dextrose Oral Gel 15 Gram(s) Oral once PRN Blood Glucose LESS THAN 70 milliGRAM(s)/deciliter  melatonin 3 milliGRAM(s) Oral at bedtime PRN Insomnia      Antibiotics History  piperacillin/tazobactam IVPB.. 3.375 Gram(s) IV Intermittent every 12 hours, 06-29-24 @ 22:03, Stop order after: 7 Days           CULTURES: (if applicable)    Culture - Sputum (collected 06-30-24 @ 09:00)  Source: .Sputum Sputum  Gram Stain (06-30-24 @ 23:39):    Numerous polymorphonuclear leukocytes per low power field    Few Squamous epithelial cells per low power field    Few Gram Negative Rods per oil power field    Few Gram positive cocci in pairs per oil power field  Final Report (07-02-24 @ 14:20):    Normal Respiratory Jocelyn present    Culture - Blood (collected 06-30-24 @ 00:00)  Source: .Blood Blood-Peripheral  Final Report (07-05-24 @ 11:00):    No growth at 5 days    Culture - Blood (collected 06-30-24 @ 00:00)  Source: .Blood Blood-Peripheral  Final Report (07-05-24 @ 11:00):    No growth at 5 days      Rapid RVP Result: NotDetec (06-29-24 @ 22:30)        CAPILLARY BLOOD GLUCOSE      POCT Blood Glucose.: 212 mg/dL (06 Jul 2024 12:20)       VITALS:  T(C): 36.7 (07-06-24 @ 08:35), Max: 36.7 (07-05-24 @ 19:52)  T(F): 98 (07-06-24 @ 08:35), Max: 98.1 (07-05-24 @ 19:52)  HR: 55 (07-06-24 @ 08:35) (55 - 77)  BP: 122/60 (07-06-24 @ 08:35) (122/60 - 163/69)  BP(mean): --  ABP: --  ABP(mean): --  RR: 16 (07-06-24 @ 08:35) (16 - 16)  SpO2: 97% (07-06-24 @ 08:35) (96% - 98%)  CVP(mm Hg): --  CVP(cm H2O): --    Ins and Outs

## 2024-07-06 NOTE — PROGRESS NOTE ADULT - SUBJECTIVE AND OBJECTIVE BOX
Patient seen and examined at bedside. trach decannulated      ALLERGIES:  No Known Allergies    MEDICATIONS  (STANDING):  albuterol    0.083% 2.5 milliGRAM(s) Nebulizer every 6 hours  amLODIPine   Tablet 10 milliGRAM(s) Oral daily  apixaban 5 milliGRAM(s) Enteral Tube two times a day  aspirin  chewable 81 milliGRAM(s) Oral daily  atorvastatin 20 milliGRAM(s) Oral at bedtime  benzonatate 100 milliGRAM(s) Oral <User Schedule>  carvedilol 25 milliGRAM(s) Oral every 12 hours  chlorhexidine 2% Cloths 1 Application(s) Topical <User Schedule>  dextrose 10% Bolus 125 milliLiter(s) IV Bolus once  dextrose 5%. 1000 milliLiter(s) (100 mL/Hr) IV Continuous <Continuous>  dextrose 5%. 1000 milliLiter(s) (50 mL/Hr) IV Continuous <Continuous>  dextrose 50% Injectable 25 Gram(s) IV Push once  dextrose 50% Injectable 12.5 Gram(s) IV Push once  epoetin reilly-epbx (RETACRIT) Injectable 57341 Unit(s) IV Push <User Schedule>  ferrous    sulfate Liquid 300 milliGRAM(s) Enteral Tube daily  glucagon  Injectable 1 milliGRAM(s) IntraMuscular once  hydrALAZINE 100 milliGRAM(s) Oral three times a day  insulin glargine Injectable (LANTUS) 5 Unit(s) SubCutaneous every morning  insulin lispro (ADMELOG) corrective regimen sliding scale   SubCutaneous three times a day before meals  lidocaine   4% Patch 1 Patch Transdermal every 24 hours  lidocaine   4% Patch 1 Patch Transdermal every 24 hours  methyl salicylate 15%/menthol 10% Topical Cream 1 Application(s) Topical <User Schedule>  modafinil 50 milliGRAM(s) Oral <User Schedule>  Nephro-noam 1 Tablet(s) Oral daily  pantoprazole   Suspension 40 milliGRAM(s) Enteral Tube two times a day  polyethylene glycol 3350 17 Gram(s) Oral daily  QUEtiapine 12.5 milliGRAM(s) Oral at bedtime  senna 2 Tablet(s) Oral at bedtime    MEDICATIONS  (PRN):  acetaminophen   Oral Liquid .. 650 milliGRAM(s) Oral every 6 hours PRN Moderate Pain (4 - 6)  bisacodyl Suppository 10 milliGRAM(s) Rectal daily PRN Constipation  dextrose Oral Gel 15 Gram(s) Oral once PRN Blood Glucose LESS THAN 70 milliGRAM(s)/deciliter  dextrose Oral Gel 15 Gram(s) Oral once PRN Blood Glucose LESS THAN 70 milliGRAM(s)/deciliter  melatonin 3 milliGRAM(s) Oral at bedtime PRN Insomnia    Vital Signs Last 24 Hrs  T(F): 98 (06 Jul 2024 08:35), Max: 98.1 (05 Jul 2024 19:52)  HR: 55 (06 Jul 2024 08:35) (55 - 77)  BP: 122/60 (06 Jul 2024 08:35) (122/60 - 163/69)  RR: 16 (06 Jul 2024 08:35) (16 - 16)  SpO2: 97% (06 Jul 2024 08:35) (96% - 98%)  I&O's Summary      PHYSICAL EXAM:    Gen: nad, resting in bed  Neuro: aaox3, no focal deficits  Heent: eomi b/l, no jvd, no oral exudates  Pulm: cta b/l, no w/r/r  CV: +s1s2, no m/r/g  Ab: soft, nt/nd, normoactive bs x 4  Extrem: no edema, pulses intact and equal  Skin: no rashes  Psych: normal    LABS:                        8.9    8.50  )-----------( 274      ( 03 Jul 2024 13:30 )             28.1       07-03    136  |  97  |  38  ----------------------------<  187  3.7   |  30  |  5.97    Ca    8.6      03 Jul 2024 13:30  Phos  4.4     07-03 05-17 Chol 86 mg/dL LDL -- HDL 27 mg/dL Trig 151 mg/dL              POCT Blood Glucose.: 212 mg/dL (06 Jul 2024 12:20)  POCT Blood Glucose.: 146 mg/dL (06 Jul 2024 07:46)  POCT Blood Glucose.: 86 mg/dL (05 Jul 2024 20:51)  POCT Blood Glucose.: 222 mg/dL (05 Jul 2024 17:29)      Urinalysis Basic - ( 03 Jul 2024 13:30 )    Color: x / Appearance: x / SG: x / pH: x  Gluc: 187 mg/dL / Ketone: x  / Bili: x / Urobili: x   Blood: x / Protein: x / Nitrite: x   Leuk Esterase: x / RBC: x / WBC x   Sq Epi: x / Non Sq Epi: x / Bacteria: x        Culture - Sputum (collected 30 Jun 2024 09:00)  Source: .Sputum Sputum  Gram Stain (30 Jun 2024 23:39):    Numerous polymorphonuclear leukocytes per low power field    Few Squamous epithelial cells per low power field    Few Gram Negative Rods per oil power field    Few Gram positive cocci in pairs per oil power field  Final Report (02 Jul 2024 14:20):    Normal Respiratory Jocelyn present    Culture - Blood (collected 30 Jun 2024 00:00)  Source: .Blood Blood-Peripheral  Final Report (05 Jul 2024 11:00):    No growth at 5 days    Culture - Blood (collected 30 Jun 2024 00:00)  Source: .Blood Blood-Peripheral  Final Report (05 Jul 2024 11:00):    No growth at 5 days      COVID-19 PCR: NotDetec (06-14-24 @ 18:56)      RADIOLOGY & ADDITIONAL TESTS:    Care Discussed with Consultants/Other Providers:

## 2024-07-06 NOTE — PROGRESS NOTE ADULT - ASSESSMENT
From primary team notes:  73-year-old male w/ past medical history hypertension, ESRD (HD MWF)  insulin-dependent DM presented 4/29/24 to [facility] 4/29 with hiccups and demand ischemia, was treated with baclofen, developed what appears to be toxic encephalopathy. He then sustained a prolonged, complex hospital course over approx 6 weeks, notable for acute respiratory failure, intubation, sepsis, CRRT, DVT, GIB, trach and PEG placement, He is admitted for acute multidisciplinary rehab on 6/14/24 to [facility].     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  MBS Monday to advance liquids once trach pulled      #ESRD on HD  #Fistula stenosis   #Anemia   - Continue HD M/W/F per renal   - Access is RUE AVF  - Regular monitoring of electrolytes  - EPOGEN w/ HD  - Iron supplementation, 300 mg daily   HD today    #HTN  - Coreg 25 q12h  - Hydralazine 100 mg TID  - Norvasc 10 mg/D  - Isordil would be next agent added  BPs Stable    #NSTEMI, Demand Ischemia, resolved  - Initially, troponins mildly elevated  - TTE (4/30): EF 72, normal, no regional wall motion abnormalities   - No need to pursue cath at this time   - 20 mg lipitor at bedtime     #IDDM2, A1c 6.9  - NPH 4u q6h  - FS, ISS TIDAC  Glucoses OK with coverage    #R Common Iliac DVT  - Initially treated with heparin but then developed questionable GIB, transitioned to Eliquis   - IR was consulted, no plan for IVC filter  - Continue Eliquis 5 mg BID     #GI  - Concern melena 5/27, resolved, transfused   - GI consulted, not candidate for endoscopy due to surgical risk  - Concern for upper GI Bleed, GI felt not true bleed  - Continue PPI BID   - MIralax 17g daily  - Senna 2 @ bedtime   - Ducolax suppository daily PRN   last BM 7/4    #Oropharyngeal Dysphagia  #s/p PEG (5/17)  - Failed green dye 6/1   - Continue feeds: Card Steady 10 cc/hr incr. q6 goal 45cc/hr - feeds changed to Bolus  MBS 6/20- cleared for minced moist with mild thicks  supplement PO with G feeds if needed  speech advanced diet to soft with mild thicks  for MBS once trach pulled      #Mood / Cognition:  - Neuropsych evaluation given prolonged and complex hospitalization  - Chart mentions concern for depression w/ possible suicidal ideation   no further hallucinations  good sleep with seroquel  provigil in AM only    #/Bladder:  - Check urinary status on arrival, possibly anuric   spont voiding    #Dysphagia:    - Diet: tube feeds/ soft with mild thicks  SP MBS- repeat MBS Monday- ? need to DC with PEG feeds to supplement  - Ongoing SLP assessment- speech advanced diet   - Nutrition to follow  OOB For ALL MEALS    #Skin/ Pressure Injury Prevention:  - Assessment on admission   - Incisions:    #Precautions/ Restrictions  - Falls, Aspiration Precautions   - COVID PCR:

## 2024-07-07 LAB
GLUCOSE BLDC GLUCOMTR-MCNC: 138 MG/DL — HIGH (ref 70–99)
GLUCOSE BLDC GLUCOMTR-MCNC: 142 MG/DL — HIGH (ref 70–99)
GLUCOSE BLDC GLUCOMTR-MCNC: 142 MG/DL — HIGH (ref 70–99)
GLUCOSE BLDC GLUCOMTR-MCNC: 170 MG/DL — HIGH (ref 70–99)
SARS-COV-2 RNA SPEC QL NAA+PROBE: SIGNIFICANT CHANGE UP

## 2024-07-07 PROCEDURE — 99232 SBSQ HOSP IP/OBS MODERATE 35: CPT

## 2024-07-07 RX ADMIN — INSULIN GLARGINE 5 UNIT(S): 100 INJECTION, SOLUTION SUBCUTANEOUS at 08:23

## 2024-07-07 RX ADMIN — MENTHOL, METHYL SALICYLATE 1 APPLICATION(S): 63; 210 PATCH PERCUTANEOUS; TOPICAL; TRANSDERMAL at 05:57

## 2024-07-07 RX ADMIN — AMLODIPINE BESYLATE 10 MILLIGRAM(S): 2.5 TABLET ORAL at 05:57

## 2024-07-07 RX ADMIN — HYDRALAZINE HYDROCHLORIDE 100 MILLIGRAM(S): 50 TABLET ORAL at 05:56

## 2024-07-07 RX ADMIN — ATORVASTATIN CALCIUM 20 MILLIGRAM(S): 20 TABLET, FILM COATED ORAL at 22:39

## 2024-07-07 RX ADMIN — APIXABAN 5 MILLIGRAM(S): 5 TABLET, FILM COATED ORAL at 17:29

## 2024-07-07 RX ADMIN — LIDOCAINE HCL 1 PATCH: 28 GEL TOPICAL at 17:28

## 2024-07-07 RX ADMIN — LIDOCAINE HCL 1 PATCH: 28 GEL TOPICAL at 19:31

## 2024-07-07 RX ADMIN — Medication 12.5 MILLIGRAM(S): at 22:39

## 2024-07-07 RX ADMIN — LIDOCAINE HCL 1 PATCH: 28 GEL TOPICAL at 06:23

## 2024-07-07 RX ADMIN — ASPIRIN 81 MILLIGRAM(S): 325 TABLET, FILM COATED ORAL at 11:55

## 2024-07-07 RX ADMIN — BENZONATATE 100 MILLIGRAM(S): 100 TABLET ORAL at 22:39

## 2024-07-07 RX ADMIN — PANTOPRAZOLE SODIUM 40 MILLIGRAM(S): 40 INJECTION, POWDER, FOR SOLUTION INTRAVENOUS at 17:26

## 2024-07-07 RX ADMIN — Medication 2.5 MILLIGRAM(S): at 16:11

## 2024-07-07 RX ADMIN — INSULIN LISPRO 1: 100 INJECTION, SOLUTION SUBCUTANEOUS at 11:55

## 2024-07-07 RX ADMIN — Medication 300 MILLIGRAM(S): at 11:55

## 2024-07-07 RX ADMIN — HYDRALAZINE HYDROCHLORIDE 100 MILLIGRAM(S): 50 TABLET ORAL at 22:38

## 2024-07-07 RX ADMIN — Medication 1 TABLET(S): at 11:55

## 2024-07-07 RX ADMIN — PANTOPRAZOLE SODIUM 40 MILLIGRAM(S): 40 INJECTION, POWDER, FOR SOLUTION INTRAVENOUS at 05:56

## 2024-07-07 RX ADMIN — CARVEDILOL PHOSPHATE 25 MILLIGRAM(S): 80 CAPSULE, EXTENDED RELEASE ORAL at 17:27

## 2024-07-07 RX ADMIN — Medication 2.5 MILLIGRAM(S): at 08:52

## 2024-07-07 RX ADMIN — Medication 2.5 MILLIGRAM(S): at 21:26

## 2024-07-07 RX ADMIN — HYDRALAZINE HYDROCHLORIDE 100 MILLIGRAM(S): 50 TABLET ORAL at 14:27

## 2024-07-07 RX ADMIN — LIDOCAINE HCL 1 PATCH: 28 GEL TOPICAL at 19:32

## 2024-07-07 RX ADMIN — MENTHOL, METHYL SALICYLATE 1 APPLICATION(S): 63; 210 PATCH PERCUTANEOUS; TOPICAL; TRANSDERMAL at 12:37

## 2024-07-07 RX ADMIN — APIXABAN 5 MILLIGRAM(S): 5 TABLET, FILM COATED ORAL at 05:56

## 2024-07-07 RX ADMIN — CARVEDILOL PHOSPHATE 25 MILLIGRAM(S): 80 CAPSULE, EXTENDED RELEASE ORAL at 05:57

## 2024-07-07 RX ADMIN — Medication 1 APPLICATION(S): at 05:57

## 2024-07-07 RX ADMIN — MODAFINIL 50 MILLIGRAM(S): 100 TABLET ORAL at 05:56

## 2024-07-07 NOTE — PROGRESS NOTE ADULT - ASSESSMENT
73M HTN, ESRD (HD MWF via AVG) DM who was admitted for unstable angina. Course complicated by AMS secondary to acute CVA vs Baclofen toxicity requiring intubation s/p Trach and PEG. Also c/b DVT and GIB. Notable course for AV Fistula dysfunction requiring balloon angioplasty.    #Recent AMS suspect due to baclofen toxicity vs CVA  #Acute CVA  - MRI head 5/6 showed acute infarct in left talya and left cerebellum   - continue holding baclofen  - continue ASA and statin  - continue Modafinil    #Acute Hypoxemic, Hypercapnic Respiratory Failure, s/p ETT intubation  #increased secretions  #Aspiration PNA?  - Zosyn q12h for 5 days  - scopolamine patch  - s/p tracheostomy   - Albuterol neb q6h   - pulm following  - oral suction  - trach decannulated on 7/5  - COVID and RVP negative    #ESRD on HD MWF via RT AFF  - stable, continue HD    #R Common Iliac DVT  - CT AP (5/12) with nonocclusive RIGHT common iliac vein deep venous thrombosis  - Continue Eliquis 5 mg BID     #Hallucination  - Seroquel 12.5mg HS  - Psych following    #HTN  - Coreg 25 q12h  - Hydralazine 100 mg TID  - Norvasc 10 mg/D  - BP acceptable     #DM2   - A1c 6.9  - Lantus 5 qAM, ISS    #Oropharyngeal Dysphagia  - s/p PEG (5/17)  - Continue PO diet and supplement with TF if po intake is poor    DVT PPX: Eliquis

## 2024-07-07 NOTE — PROGRESS NOTE ADULT - SUBJECTIVE AND OBJECTIVE BOX
Cc: CVA, Encephalopathy    HPI: Patient with no new medical issues today.  Pain controlled, no chest pain, no N/V, no Fevers/Chills. No other new ROS  Has been tolerating rehabilitation program.    acetaminophen   Oral Liquid .. 650 milliGRAM(s) Oral every 6 hours PRN  albuterol    0.083% 2.5 milliGRAM(s) Nebulizer every 6 hours  amLODIPine   Tablet 10 milliGRAM(s) Oral daily  apixaban 5 milliGRAM(s) Enteral Tube two times a day  aspirin  chewable 81 milliGRAM(s) Oral daily  atorvastatin 20 milliGRAM(s) Oral at bedtime  benzonatate 100 milliGRAM(s) Oral <User Schedule>  bisacodyl Suppository 10 milliGRAM(s) Rectal daily PRN  carvedilol 25 milliGRAM(s) Oral every 12 hours  chlorhexidine 2% Cloths 1 Application(s) Topical <User Schedule>  dextrose 10% Bolus 125 milliLiter(s) IV Bolus once  dextrose 5%. 1000 milliLiter(s) IV Continuous <Continuous>  dextrose 5%. 1000 milliLiter(s) IV Continuous <Continuous>  dextrose 50% Injectable 25 Gram(s) IV Push once  dextrose 50% Injectable 12.5 Gram(s) IV Push once  dextrose Oral Gel 15 Gram(s) Oral once PRN  dextrose Oral Gel 15 Gram(s) Oral once PRN  epoetin reilly-epbx (RETACRIT) Injectable 09051 Unit(s) IV Push <User Schedule>  ferrous    sulfate Liquid 300 milliGRAM(s) Enteral Tube daily  glucagon  Injectable 1 milliGRAM(s) IntraMuscular once  hydrALAZINE 100 milliGRAM(s) Oral three times a day  insulin glargine Injectable (LANTUS) 5 Unit(s) SubCutaneous every morning  insulin lispro (ADMELOG) corrective regimen sliding scale   SubCutaneous three times a day before meals  lidocaine   4% Patch 1 Patch Transdermal every 24 hours  lidocaine   4% Patch 1 Patch Transdermal every 24 hours  melatonin 3 milliGRAM(s) Oral at bedtime PRN  methyl salicylate 15%/menthol 10% Topical Cream 1 Application(s) Topical <User Schedule>  modafinil 50 milliGRAM(s) Oral <User Schedule>  Nephro-noam 1 Tablet(s) Oral daily  pantoprazole   Suspension 40 milliGRAM(s) Enteral Tube two times a day  polyethylene glycol 3350 17 Gram(s) Oral daily  QUEtiapine 12.5 milliGRAM(s) Oral at bedtime  senna 2 Tablet(s) Oral at bedtime      T(C): 36.7 (07-05-24 @ 19:52), Max: 36.7 (07-05-24 @ 19:52)  HR: 58 (07-06-24 @ 06:00) (55 - 77)  BP: 163/69 (07-06-24 @ 06:00) (129/65 - 163/69)  RR: 16 (07-05-24 @ 19:52) (16 - 16)  SpO2: 98% (07-05-24 @ 19:52) (96% - 98%)    In NAD  HEENT- EOMI  Heart- Well Perfused  Lungs- Trach removed, stoma covered  Neuro- Exam unchanged  Psych- Affect wnl          Imp: Patient with diagnosis of    CVA, Encephalopathy      admitted for comprehensive acute rehabilitation.    Plan:  - Continue therapies  - DVT prophylaxis  - Skin- Turn q2h, check skin daily  - Continue current medications; patient medically stable.   - Patient is stable to continue current rehabilitation program.   -Pulmonology notes from 7/6/24 reviewed:  "1. Chronic Respiratory failure s/p Trach s/p decanulation 7/5/24  2. AMS appears to have improved , ? Baclofen toxicity vs CVA  3. ESRD on HD, DM 2,   4. Right iliac DVT on Eliquis    Plan   cover stoma via band aide  monitor secretions if any  monitor on room air"

## 2024-07-07 NOTE — CHART NOTE - NSCHARTNOTEFT_GEN_A_CORE
NUTRITION FOLLOW UP    SOURCE: Patient [X)   Medical Record (X) Medical Team (X)  Pt reports good appetite. Per discussion with CNA, pt with po intake >50% all meals today and yesterday and did not require Nepro bolus. Continue nutrition regimen as ordered. Will continue to monitor.     DIET: Soft and Bite Sized, Renal Replacement, Mildly Thick Liquids    PATIENT REPORT  [x] other: no GI distress    PO INTAKE:   [x] %        Enteral Nutrition : If po intake <50% provide Nepro 247 ml  Which is providing :   425 Calories, 19 g/ protein                 CURRENT WEIGHT:   44.8 kg - (7/7)  46.2 kg - (7/6)  46.6 kg - (7/3)  47.7 kg - (6/30)  49.6 kg - (6/26)  49.5 kg - (6/21)  47.6 kg - (6/19)    PERTINENT MEDS:   Pertinent Medications: MEDICATIONS  (STANDING):  albuterol    0.083% 2.5 milliGRAM(s) Nebulizer every 6 hours  amLODIPine   Tablet 10 milliGRAM(s) Oral daily  apixaban 5 milliGRAM(s) Enteral Tube two times a day  aspirin  chewable 81 milliGRAM(s) Oral daily  atorvastatin 20 milliGRAM(s) Oral at bedtime  benzonatate 100 milliGRAM(s) Oral <User Schedule>  carvedilol 25 milliGRAM(s) Oral every 12 hours  chlorhexidine 2% Cloths 1 Application(s) Topical <User Schedule>  dextrose 10% Bolus 125 milliLiter(s) IV Bolus once  dextrose 5%. 1000 milliLiter(s) (100 mL/Hr) IV Continuous <Continuous>  dextrose 5%. 1000 milliLiter(s) (50 mL/Hr) IV Continuous <Continuous>  dextrose 50% Injectable 25 Gram(s) IV Push once  dextrose 50% Injectable 12.5 Gram(s) IV Push once  epoetin reilly-epbx (RETACRIT) Injectable 03539 Unit(s) IV Push <User Schedule>  ferrous    sulfate Liquid 300 milliGRAM(s) Enteral Tube daily  glucagon  Injectable 1 milliGRAM(s) IntraMuscular once  hydrALAZINE 100 milliGRAM(s) Oral three times a day  insulin glargine Injectable (LANTUS) 5 Unit(s) SubCutaneous every morning  insulin lispro (ADMELOG) corrective regimen sliding scale   SubCutaneous three times a day before meals  lidocaine   4% Patch 1 Patch Transdermal every 24 hours  lidocaine   4% Patch 1 Patch Transdermal every 24 hours  methyl salicylate 15%/menthol 10% Topical Cream 1 Application(s) Topical <User Schedule>  modafinil 50 milliGRAM(s) Oral <User Schedule>  Nephro-noam 1 Tablet(s) Oral daily  pantoprazole   Suspension 40 milliGRAM(s) Enteral Tube two times a day  polyethylene glycol 3350 17 Gram(s) Oral daily  QUEtiapine 12.5 milliGRAM(s) Oral at bedtime  senna 2 Tablet(s) Oral at bedtime    MEDICATIONS  (PRN):  acetaminophen   Oral Liquid .. 650 milliGRAM(s) Oral every 6 hours PRN Moderate Pain (4 - 6)  bisacodyl Suppository 10 milliGRAM(s) Rectal daily PRN Constipation  dextrose Oral Gel 15 Gram(s) Oral once PRN Blood Glucose LESS THAN 70 milliGRAM(s)/deciliter  dextrose Oral Gel 15 Gram(s) Oral once PRN Blood Glucose LESS THAN 70 milliGRAM(s)/deciliter  melatonin 3 milliGRAM(s) Oral at bedtime PRN Insomnia      PERTINENT LABS:  07-06 Na135 mmol/L Glu 214 mg/dL<H> K+ 3.9 mmol/L Cr  7.63 mg/dL<H> BUN 50 mg/dL<H> 07-06 Phos 5.4 mg/dL<H>    SKIN:  Stage 2 buttocks   EDEMA: WDL  LAST BM: 7/6 per flowsheet     ESTIMATED NEEDS:   [X] no change since previous assessment    PREVIOUS NUTRITION DIAGNOSIS:  Severe Malnutrition; Swallowing Difficulty     NUTRITION DIAGNOSIS is :  (x)  Ongoing        NEW NUTRITION DIAGNOSIS: N/A    NUTRITION RECOMMENDATIONS: Continue current nutrition regimen     MONITORING AND EVALUATION:   1. Tolerance to diet prescription   2. PO intake  3. Weights  4. Labs  5. Follow Up per protocol     RD to remain available  Reyna Hanson MS,RD,CDN

## 2024-07-07 NOTE — PROGRESS NOTE ADULT - ASSESSMENT
From primary team notes:  73-year-old male w/ past medical history hypertension, ESRD (HD MWF)  insulin-dependent DM presented 4/29/24 to [facility] 4/29 with hiccups and demand ischemia, was treated with baclofen, developed what appears to be toxic encephalopathy. He then sustained a prolonged, complex hospital course over approx 6 weeks, notable for acute respiratory failure, intubation, sepsis, CRRT, DVT, GIB, trach and PEG placement, He is admitted for acute multidisciplinary rehab on 6/14/24 to [facility].     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  MBS Monday to advance liquids once trach pulled      #ESRD on HD  #Fistula stenosis   #Anemia   - Continue HD M/W/F per renal   - Access is RUE AVF  - Regular monitoring of electrolytes  - EPOGEN w/ HD  - Iron supplementation, 300 mg daily   HD today    #HTN  - Coreg 25 q12h  - Hydralazine 100 mg TID  - Norvasc 10 mg/D  - Isordil would be next agent added  BPs Stable    #NSTEMI, Demand Ischemia, resolved  - Initially, troponins mildly elevated  - TTE (4/30): EF 72, normal, no regional wall motion abnormalities   - No need to pursue cath at this time   - 20 mg lipitor at bedtime     #IDDM2, A1c 6.9  - NPH 4u q6h  - FS, ISS TIDAC  Glucoses OK with coverage    #R Common Iliac DVT  - Initially treated with heparin but then developed questionable GIB, transitioned to Eliquis   - IR was consulted, no plan for IVC filter  - Continue Eliquis 5 mg BID     #GI  - Concern melena 5/27, resolved, transfused   - GI consulted, not candidate for endoscopy due to surgical risk  - Concern for upper GI Bleed, GI felt not true bleed  - Continue PPI BID   - MIralax 17g daily  - Senna 2 @ bedtime   - Ducolax suppository daily PRN   last BM 7/4    #Oropharyngeal Dysphagia  #s/p PEG (5/17)  - Failed green dye 6/1   - Continue feeds: Card Steady 10 cc/hr incr. q6 goal 45cc/hr - feeds changed to Bolus  MBS 6/20- cleared for minced moist with mild thicks  supplement PO with G feeds if needed  speech advanced diet to soft with mild thicks  for MBS once trach pulled      #Mood / Cognition:  - Neuropsych evaluation given prolonged and complex hospitalization  - Chart mentions concern for depression w/ possible suicidal ideation   no further hallucinations  good sleep with seroquel  provigil in AM only    #/Bladder:  - Check urinary status on arrival, possibly anuric   spont voiding    #Dysphagia:    - Diet: tube feeds/ soft with mild thicks  SP MBS- repeat MBS Monday- ? need to DC with PEG feeds to supplement  - Ongoing SLP assessment- speech advanced diet   - Nutrition to follow  OOB For ALL MEALS    #Skin/ Pressure Injury Prevention:  - Assessment on admission   - Incisions:    #Precautions/ Restrictions  - Falls, Aspiration Precautions   - COVID PCR:

## 2024-07-07 NOTE — PROGRESS NOTE ADULT - SUBJECTIVE AND OBJECTIVE BOX
Patient seen and examined at bedside. no complaints.      ALLERGIES:  No Known Allergies    MEDICATIONS  (STANDING):  albuterol    0.083% 2.5 milliGRAM(s) Nebulizer every 6 hours  amLODIPine   Tablet 10 milliGRAM(s) Oral daily  apixaban 5 milliGRAM(s) Enteral Tube two times a day  aspirin  chewable 81 milliGRAM(s) Oral daily  atorvastatin 20 milliGRAM(s) Oral at bedtime  benzonatate 100 milliGRAM(s) Oral <User Schedule>  carvedilol 25 milliGRAM(s) Oral every 12 hours  chlorhexidine 2% Cloths 1 Application(s) Topical <User Schedule>  dextrose 10% Bolus 125 milliLiter(s) IV Bolus once  dextrose 5%. 1000 milliLiter(s) (50 mL/Hr) IV Continuous <Continuous>  dextrose 5%. 1000 milliLiter(s) (100 mL/Hr) IV Continuous <Continuous>  dextrose 50% Injectable 12.5 Gram(s) IV Push once  dextrose 50% Injectable 25 Gram(s) IV Push once  epoetin reilly-epbx (RETACRIT) Injectable 63436 Unit(s) IV Push <User Schedule>  ferrous    sulfate Liquid 300 milliGRAM(s) Enteral Tube daily  glucagon  Injectable 1 milliGRAM(s) IntraMuscular once  hydrALAZINE 100 milliGRAM(s) Oral three times a day  insulin glargine Injectable (LANTUS) 5 Unit(s) SubCutaneous every morning  insulin lispro (ADMELOG) corrective regimen sliding scale   SubCutaneous three times a day before meals  lidocaine   4% Patch 1 Patch Transdermal every 24 hours  lidocaine   4% Patch 1 Patch Transdermal every 24 hours  methyl salicylate 15%/menthol 10% Topical Cream 1 Application(s) Topical <User Schedule>  modafinil 50 milliGRAM(s) Oral <User Schedule>  Nephro-noam 1 Tablet(s) Oral daily  pantoprazole   Suspension 40 milliGRAM(s) Enteral Tube two times a day  polyethylene glycol 3350 17 Gram(s) Oral daily  QUEtiapine 12.5 milliGRAM(s) Oral at bedtime  senna 2 Tablet(s) Oral at bedtime    MEDICATIONS  (PRN):  acetaminophen   Oral Liquid .. 650 milliGRAM(s) Oral every 6 hours PRN Moderate Pain (4 - 6)  bisacodyl Suppository 10 milliGRAM(s) Rectal daily PRN Constipation  dextrose Oral Gel 15 Gram(s) Oral once PRN Blood Glucose LESS THAN 70 milliGRAM(s)/deciliter  dextrose Oral Gel 15 Gram(s) Oral once PRN Blood Glucose LESS THAN 70 milliGRAM(s)/deciliter  melatonin 3 milliGRAM(s) Oral at bedtime PRN Insomnia    Vital Signs Last 24 Hrs  T(F): 98.4 (07 Jul 2024 09:10), Max: 98.4 (06 Jul 2024 14:40)  HR: 63 (07 Jul 2024 09:10) (58 - 63)  BP: 111/57 (07 Jul 2024 09:10) (111/57 - 173/77)  RR: 16 (07 Jul 2024 09:10) (16 - 17)  SpO2: 97% (07 Jul 2024 09:10) (97% - 98%)  I&O's Summary    06 Jul 2024 07:01  -  07 Jul 2024 07:00  --------------------------------------------------------  IN: 800 mL / OUT: 2300 mL / NET: -1500 mL        PHYSICAL EXAM:    Gen: nad, resting in bed  Neuro: aaox3, no focal deficits  Heent: eomi b/l, no jvd, no oral exudates  Pulm: cta b/l, no w/r/r  CV: +s1s2, no m/r/g  Ab: soft, nt/nd, normoactive bs x 4  Extrem: no edema, pulses intact and equal  Skin: no rashes  Psych: normal    LABS:                        8.3    7.48  )-----------( 305      ( 06 Jul 2024 14:40 )             25.4       07-06    135  |  96  |  50  ----------------------------<  214  3.9   |  28  |  7.63    Ca    8.3      06 Jul 2024 14:40  Phos  5.4     07-06         05-17 Chol 86 mg/dL LDL -- HDL 27 mg/dL Trig 151 mg/dL      POCT Blood Glucose.: 142 mg/dL (07 Jul 2024 07:35)  POCT Blood Glucose.: 167 mg/dL (06 Jul 2024 21:16)  POCT Blood Glucose.: 102 mg/dL (06 Jul 2024 18:15)  POCT Blood Glucose.: 212 mg/dL (06 Jul 2024 12:20)      Urinalysis Basic - ( 06 Jul 2024 14:40 )    Color: x / Appearance: x / SG: x / pH: x  Gluc: 214 mg/dL / Ketone: x  / Bili: x / Urobili: x   Blood: x / Protein: x / Nitrite: x   Leuk Esterase: x / RBC: x / WBC x   Sq Epi: x / Non Sq Epi: x / Bacteria: x        COVID-19 PCR: NotDetec (07-07-24 @ 04:30)  COVID-19 PCR: NotDetec (06-14-24 @ 18:56)      RADIOLOGY & ADDITIONAL TESTS:    Care Discussed with Consultants/Other Providers:

## 2024-07-08 ENCOUNTER — TRANSCRIPTION ENCOUNTER (OUTPATIENT)
Age: 74
End: 2024-07-08

## 2024-07-08 LAB
GLUCOSE BLDC GLUCOMTR-MCNC: 120 MG/DL — HIGH (ref 70–99)
GLUCOSE BLDC GLUCOMTR-MCNC: 157 MG/DL — HIGH (ref 70–99)
GLUCOSE BLDC GLUCOMTR-MCNC: 195 MG/DL — HIGH (ref 70–99)
GLUCOSE BLDC GLUCOMTR-MCNC: 216 MG/DL — HIGH (ref 70–99)

## 2024-07-08 PROCEDURE — 99232 SBSQ HOSP IP/OBS MODERATE 35: CPT | Mod: GC

## 2024-07-08 PROCEDURE — 99232 SBSQ HOSP IP/OBS MODERATE 35: CPT

## 2024-07-08 PROCEDURE — 74230 X-RAY XM SWLNG FUNCJ C+: CPT | Mod: 26

## 2024-07-08 RX ADMIN — Medication 2.5 MILLIGRAM(S): at 21:39

## 2024-07-08 RX ADMIN — Medication 12.5 MILLIGRAM(S): at 20:46

## 2024-07-08 RX ADMIN — APIXABAN 5 MILLIGRAM(S): 5 TABLET, FILM COATED ORAL at 06:32

## 2024-07-08 RX ADMIN — ATORVASTATIN CALCIUM 20 MILLIGRAM(S): 20 TABLET, FILM COATED ORAL at 20:46

## 2024-07-08 RX ADMIN — Medication 2.5 MILLIGRAM(S): at 05:21

## 2024-07-08 RX ADMIN — ASPIRIN 81 MILLIGRAM(S): 325 TABLET, FILM COATED ORAL at 12:28

## 2024-07-08 RX ADMIN — Medication 2.5 MILLIGRAM(S): at 15:35

## 2024-07-08 RX ADMIN — HYDRALAZINE HYDROCHLORIDE 100 MILLIGRAM(S): 50 TABLET ORAL at 14:00

## 2024-07-08 RX ADMIN — HYDRALAZINE HYDROCHLORIDE 100 MILLIGRAM(S): 50 TABLET ORAL at 20:46

## 2024-07-08 RX ADMIN — PANTOPRAZOLE SODIUM 40 MILLIGRAM(S): 40 INJECTION, POWDER, FOR SOLUTION INTRAVENOUS at 17:21

## 2024-07-08 RX ADMIN — LIDOCAINE HCL 1 PATCH: 28 GEL TOPICAL at 04:34

## 2024-07-08 RX ADMIN — CARVEDILOL PHOSPHATE 25 MILLIGRAM(S): 80 CAPSULE, EXTENDED RELEASE ORAL at 06:33

## 2024-07-08 RX ADMIN — INSULIN GLARGINE 5 UNIT(S): 100 INJECTION, SOLUTION SUBCUTANEOUS at 07:48

## 2024-07-08 RX ADMIN — MODAFINIL 50 MILLIGRAM(S): 100 TABLET ORAL at 06:32

## 2024-07-08 RX ADMIN — INSULIN LISPRO 2: 100 INJECTION, SOLUTION SUBCUTANEOUS at 12:27

## 2024-07-08 RX ADMIN — AMLODIPINE BESYLATE 10 MILLIGRAM(S): 2.5 TABLET ORAL at 06:32

## 2024-07-08 RX ADMIN — PANTOPRAZOLE SODIUM 40 MILLIGRAM(S): 40 INJECTION, POWDER, FOR SOLUTION INTRAVENOUS at 06:32

## 2024-07-08 RX ADMIN — BENZONATATE 100 MILLIGRAM(S): 100 TABLET ORAL at 22:10

## 2024-07-08 RX ADMIN — Medication 2.5 MILLIGRAM(S): at 07:59

## 2024-07-08 RX ADMIN — Medication 1 APPLICATION(S): at 06:33

## 2024-07-08 RX ADMIN — APIXABAN 5 MILLIGRAM(S): 5 TABLET, FILM COATED ORAL at 17:20

## 2024-07-08 RX ADMIN — INSULIN LISPRO 1: 100 INJECTION, SOLUTION SUBCUTANEOUS at 07:48

## 2024-07-08 RX ADMIN — Medication 1 TABLET(S): at 12:28

## 2024-07-08 RX ADMIN — HYDRALAZINE HYDROCHLORIDE 100 MILLIGRAM(S): 50 TABLET ORAL at 06:32

## 2024-07-08 RX ADMIN — Medication 2 TABLET(S): at 20:46

## 2024-07-08 RX ADMIN — Medication 300 MILLIGRAM(S): at 12:28

## 2024-07-08 RX ADMIN — MENTHOL, METHYL SALICYLATE 1 APPLICATION(S): 63; 210 PATCH PERCUTANEOUS; TOPICAL; TRANSDERMAL at 13:50

## 2024-07-08 NOTE — DISCHARGE NOTE NURSING/CASE MANAGEMENT/SOCIAL WORK - NSDCCRNAME_GEN_ALL_CORE_FT
Dialysis Chair with [address]. [facility] [address] Mon Wed Fri 4:15PM. ([phone]).

## 2024-07-08 NOTE — PROGRESS NOTE ADULT - SUBJECTIVE AND OBJECTIVE BOX
73-year-old male w/ past medical history hypertension, ESRD (HD MWF)  insulin-dependent DM presented 4/29/24 to [facility] with hiccups, chest pain of several days duration, admitted for concern demand ischemia. Early on, he was given baclofen for his hiccups, but soon developed AMS with 2 RRTs by 5/2/24, prompting intubation, upgrade to MICU. There was concern that baclofen toxicity was the culprit of his then-encephalopathic state, as patient had also missed a HD session due to a clotted fistula.     In coming days, he was treated empirically with 5 days of zosyn, had a negative CTH and LP, and had an EEG that did not capture a seizure but showed diffuse non-specific cerebral dysfunction - c/w toxic-metabolic encephalopathies. On 5/6, an MRI showed that the patient had sustained R pontine and cerebellar stroke. He was extubated on 5/9, but owing to a challenging extubation became septic w/ B Cereus, treated with Vanc. He had trach placed 5/14, PEG 5/17, then downgraded.     On the floors, he was followed by several services, including Neurology, who mention the strokes were embolic appearing. Subsequent hospital course notable for nonocclusive R common iliac DVT, for which he was started on a heparin drip which was stopped when patient developed an upper GI Bleed, though GI was consulted and felt he did not have an overt bleed. His dialysis fistula site was stenotic, s/p fistulogram, ultimately changed to a RUE AVF 6/5. He was ultimately started on Eliquis 6/7 for the DVT. Medically stable, he is admitted to [facility] on 6/14/24.     ROS/subjective:  patient seen and evaluated bedside  no events over weekend  trach out- sats good  SP MBS- advanced to thins via small sips  no dizziness, no headaches, no SOB, No Chest pain, No nausea, no vomiting  no teeth pain  last BM 7/8  ready for Dc in AM if HD spot available for Weds    MEDICATIONS  (STANDING):  albuterol    0.083% 2.5 milliGRAM(s) Nebulizer every 6 hours  amLODIPine   Tablet 10 milliGRAM(s) Oral daily  apixaban 5 milliGRAM(s) Enteral Tube two times a day  aspirin  chewable 81 milliGRAM(s) Oral daily  atorvastatin 20 milliGRAM(s) Oral at bedtime  benzonatate 100 milliGRAM(s) Oral <User Schedule>  carvedilol 25 milliGRAM(s) Oral every 12 hours  chlorhexidine 2% Cloths 1 Application(s) Topical <User Schedule>  dextrose 10% Bolus 125 milliLiter(s) IV Bolus once  dextrose 5%. 1000 milliLiter(s) (50 mL/Hr) IV Continuous <Continuous>  dextrose 5%. 1000 milliLiter(s) (100 mL/Hr) IV Continuous <Continuous>  dextrose 50% Injectable 12.5 Gram(s) IV Push once  dextrose 50% Injectable 25 Gram(s) IV Push once  epoetin reilly-epbx (RETACRIT) Injectable 36427 Unit(s) IV Push <User Schedule>  ferrous    sulfate Liquid 300 milliGRAM(s) Enteral Tube daily  glucagon  Injectable 1 milliGRAM(s) IntraMuscular once  hydrALAZINE 100 milliGRAM(s) Oral three times a day  insulin glargine Injectable (LANTUS) 5 Unit(s) SubCutaneous every morning  insulin lispro (ADMELOG) corrective regimen sliding scale   SubCutaneous three times a day before meals  lidocaine   4% Patch 1 Patch Transdermal every 24 hours  lidocaine   4% Patch 1 Patch Transdermal every 24 hours  methyl salicylate 15%/menthol 10% Topical Cream 1 Application(s) Topical <User Schedule>  modafinil 50 milliGRAM(s) Oral <User Schedule>  Nephro-noam 1 Tablet(s) Oral daily  pantoprazole   Suspension 40 milliGRAM(s) Enteral Tube two times a day  polyethylene glycol 3350 17 Gram(s) Oral daily  QUEtiapine 12.5 milliGRAM(s) Oral at bedtime  senna 2 Tablet(s) Oral at bedtime    MEDICATIONS  (PRN):  acetaminophen   Oral Liquid .. 650 milliGRAM(s) Oral every 6 hours PRN Moderate Pain (4 - 6)  bisacodyl Suppository 10 milliGRAM(s) Rectal daily PRN Constipation  dextrose Oral Gel 15 Gram(s) Oral once PRN Blood Glucose LESS THAN 70 milliGRAM(s)/deciliter  dextrose Oral Gel 15 Gram(s) Oral once PRN Blood Glucose LESS THAN 70 milliGRAM(s)/deciliter  melatonin 3 milliGRAM(s) Oral at bedtime PRN Insomnia                            8.3    7.48  )-----------( 305      ( 06 Jul 2024 14:40 )             25.4     07-06    135  |  96  |  50<H>  ----------------------------<  214<H>  3.9   |  28  |  7.63<H>    Ca    8.3<L>      06 Jul 2024 14:40  Phos  5.4     07-06      Urinalysis Basic - ( 06 Jul 2024 14:40 )    Color: x / Appearance: x / SG: x / pH: x  Gluc: 214 mg/dL / Ketone: x  / Bili: x / Urobili: x   Blood: x / Protein: x / Nitrite: x   Leuk Esterase: x / RBC: x / WBC x   Sq Epi: x / Non Sq Epi: x / Bacteria: x        CAPILLARY BLOOD GLUCOSE      POCT Blood Glucose.: 216 mg/dL (08 Jul 2024 12:09)  POCT Blood Glucose.: 157 mg/dL (08 Jul 2024 07:44)  POCT Blood Glucose.: 142 mg/dL (07 Jul 2024 22:31)  POCT Blood Glucose.: 138 mg/dL (07 Jul 2024 17:25)      Vital Signs Last 24 Hrs  T(C): 36.7 (08 Jul 2024 09:30), Max: 36.9 (08 Jul 2024 06:33)  T(F): 98 (08 Jul 2024 09:30), Max: 98.5 (08 Jul 2024 06:33)  HR: 60 (08 Jul 2024 13:59) (60 - 66)  BP: 149/70 (08 Jul 2024 13:59) (123/68 - 149/70)  BP(mean): --  RR: 16 (08 Jul 2024 09:30) (15 - 16)  SpO2: 98% (08 Jul 2024 10:18) (97% - 98%)    Parameters below as of 08 Jul 2024 09:30  Patient On (Oxygen Delivery Method): room air        Constitutional - NAD, trach out verbalizes well   HEENT - NCAT,   Neck - Supple, No limited ROM  Chest - good chest expansion, good respiratory effort coarse BS  Cardio - warm and well perfused,   Abdomen -  Soft, NTND. + peg.   Extremities - No peripheral edema, No calf tenderness. RUE fistula.   Neurologic Exam:                    Cognitive -      Cranial Nerves - No facial asymmetry, Tongue midline, Shoulder shrug intact no facial droop     Motor -                      LEFT    UE - ShAB 4/5, EF 4/5, EE 3/5, WE 4/5,  WNL                    RIGHT UE - ShAB 4/5, EF 4/5, EE 3/5, WE 4/5,  WNL                    LEFT    LE - HF 3/5, KE 4/5, DF 4/5, PF 4/5                    RIGHT LE - HF 3/5, KE 4/5, DF 4/5, PF 4/5        Sensory - Intact to LT bilateral in face, arm and legs.      Reflexes - DTR 1 / 4 biceps symmetric. neg Latham's b/l, No clonus.  Psychiatric - Mood stable, Affect WNL  Skin on admission: no sacral pressure injuries.      IDT in [facility] 7/3  Tentative DC 7/9 to home-  family training on Friday     73-year-old male w/ past medical history hypertension, ESRD (HD MWF)  insulin-dependent DM presented 4/29/24 to [facility] with hiccups, chest pain of several days duration, admitted for concern demand ischemia. Early on, he was given baclofen for his hiccups, but soon developed AMS with 2 RRTs by 5/2/24, prompting intubation, upgrade to MICU. There was concern that baclofen toxicity was the culprit of his then-encephalopathic state, as patient had also missed a HD session due to a clotted fistula.     In coming days, he was treated empirically with 5 days of zosyn, had a negative CTH and LP, and had an EEG that did not capture a seizure but showed diffuse non-specific cerebral dysfunction - c/w toxic-metabolic encephalopathies. On 5/6, an MRI showed that the patient had sustained R pontine and cerebellar stroke. He was extubated on 5/9, but owing to a challenging extubation became septic w/ B Cereus, treated with Vanc. He had trach placed 5/14, PEG 5/17, then downgraded.     On the floors, he was followed by several services, including Neurology, who mention the strokes were embolic appearing. Subsequent hospital course notable for nonocclusive R common iliac DVT, for which he was started on a heparin drip which was stopped when patient developed an upper GI Bleed, though GI was consulted and felt he did not have an overt bleed. His dialysis fistula site was stenotic, s/p fistulogram, ultimately changed to a RUE AVF 6/5. He was ultimately started on Eliquis 6/7 for the DVT. Medically stable, he is admitted to [facility] on 6/14/24.     ROS/subjective:  patient seen and evaluated bedside  no events over weekend  trach out- sats good  SP MBS- advanced to thins via small sips  no dizziness, no headaches, no SOB, No Chest pain, No nausea, no vomiting  no teeth pain  last BM 7/8  COVID Exposure over weekend- no symptomatology at present  ready for Dc in AM if HD spot available for [facility]eds    MEDICATIONS  (STANDING):  albuterol    0.083% 2.5 milliGRAM(s) Nebulizer every 6 hours  amLODIPine   Tablet 10 milliGRAM(s) Oral daily  apixaban 5 milliGRAM(s) Enteral Tube two times a day  aspirin  chewable 81 milliGRAM(s) Oral daily  atorvastatin 20 milliGRAM(s) Oral at bedtime  benzonatate 100 milliGRAM(s) Oral <User Schedule>  carvedilol 25 milliGRAM(s) Oral every 12 hours  chlorhexidine 2% Cloths 1 Application(s) Topical <User Schedule>  dextrose 10% Bolus 125 milliLiter(s) IV Bolus once  dextrose 5%. 1000 milliLiter(s) (50 mL/Hr) IV Continuous <Continuous>  dextrose 5%. 1000 milliLiter(s) (100 mL/Hr) IV Continuous <Continuous>  dextrose 50% Injectable 12.5 Gram(s) IV Push once  dextrose 50% Injectable 25 Gram(s) IV Push once  epoetin reilly-epbx (RETACRIT) Injectable 42539 Unit(s) IV Push <User Schedule>  ferrous    sulfate Liquid 300 milliGRAM(s) Enteral Tube daily  glucagon  Injectable 1 milliGRAM(s) IntraMuscular once  hydrALAZINE 100 milliGRAM(s) Oral three times a day  insulin glargine Injectable (LANTUS) 5 Unit(s) SubCutaneous every morning  insulin lispro (ADMELOG) corrective regimen sliding scale   SubCutaneous three times a day before meals  lidocaine   4% Patch 1 Patch Transdermal every 24 hours  lidocaine   4% Patch 1 Patch Transdermal every 24 hours  methyl salicylate 15%/menthol 10% Topical Cream 1 Application(s) Topical <User Schedule>  modafinil 50 milliGRAM(s) Oral <User Schedule>  Nephro-noam 1 Tablet(s) Oral daily  pantoprazole   Suspension 40 milliGRAM(s) Enteral Tube two times a day  polyethylene glycol 3350 17 Gram(s) Oral daily  QUEtiapine 12.5 milliGRAM(s) Oral at bedtime  senna 2 Tablet(s) Oral at bedtime    MEDICATIONS  (PRN):  acetaminophen   Oral Liquid .. 650 milliGRAM(s) Oral every 6 hours PRN Moderate Pain (4 - 6)  bisacodyl Suppository 10 milliGRAM(s) Rectal daily PRN Constipation  dextrose Oral Gel 15 Gram(s) Oral once PRN Blood Glucose LESS THAN 70 milliGRAM(s)/deciliter  dextrose Oral Gel 15 Gram(s) Oral once PRN Blood Glucose LESS THAN 70 milliGRAM(s)/deciliter  melatonin 3 milliGRAM(s) Oral at bedtime PRN Insomnia                            8.3    7.48  )-----------( 305      ( 06 Jul 2024 14:40 )             25.4     07-06    135  |  96  |  50<H>  ----------------------------<  214<H>  3.9   |  28  |  7.63<H>    Ca    8.3<L>      06 Jul 2024 14:40  Phos  5.4     07-06      Urinalysis Basic - ( 06 Jul 2024 14:40 )    Color: x / Appearance: x / SG: x / pH: x  Gluc: 214 mg/dL / Ketone: x  / Bili: x / Urobili: x   Blood: x / Protein: x / Nitrite: x   Leuk Esterase: x / RBC: x / WBC x   Sq Epi: x / Non Sq Epi: x / Bacteria: x        CAPILLARY BLOOD GLUCOSE      POCT Blood Glucose.: 216 mg/dL (08 Jul 2024 12:09)  POCT Blood Glucose.: 157 mg/dL (08 Jul 2024 07:44)  POCT Blood Glucose.: 142 mg/dL (07 Jul 2024 22:31)  POCT Blood Glucose.: 138 mg/dL (07 Jul 2024 17:25)      Vital Signs Last 24 Hrs  T(C): 36.7 (08 Jul 2024 09:30), Max: 36.9 (08 Jul 2024 06:33)  T(F): 98 (08 Jul 2024 09:30), Max: 98.5 (08 Jul 2024 06:33)  HR: 60 (08 Jul 2024 13:59) (60 - 66)  BP: 149/70 (08 Jul 2024 13:59) (123/68 - 149/70)  BP(mean): --  RR: 16 (08 Jul 2024 09:30) (15 - 16)  SpO2: 98% (08 Jul 2024 10:18) (97% - 98%)    Parameters below as of 08 Jul 2024 09:30  Patient On (Oxygen Delivery Method): room air        Constitutional - NAD, trach out verbalizes well   HEENT - NCAT,   Neck - Supple, No limited ROM  Chest - good chest expansion, good respiratory effort coarse BS  Cardio - warm and well perfused,   Abdomen -  Soft, NTND. + peg.   Extremities - No peripheral edema, No calf tenderness. RUE fistula.   Neurologic Exam:                    Cognitive -      Cranial Nerves - No facial asymmetry, Tongue midline, Shoulder shrug intact no facial droop     Motor -                      LEFT    UE - ShAB 4/5, EF 4/5, EE 3/5, WE 4/5,  WNL                    RIGHT UE - ShAB 4/5, EF 4/5, EE 3/5, WE 4/5,  WNL                    LEFT    LE - HF 3/5, KE 4/5, DF 4/5, PF 4/5                    RIGHT LE - HF 3/5, KE 4/5, DF 4/5, PF 4/5        Sensory - Intact to LT bilateral in face, arm and legs.      Reflexes - DTR 1 / 4 biceps symmetric. neg Latham's b/l, No clonus.  Psychiatric - Mood stable, Affect WNL  Skin on admission: no sacral pressure injuries.      IDT in [facility] 7/3  Tentative DC 7/9 to home-  family training on Friday

## 2024-07-08 NOTE — PROGRESS NOTE ADULT - SUBJECTIVE AND OBJECTIVE BOX
No distress    Vital Signs Last 24 Hrs  T(C): 36.6 (07-08-24 @ 21:41), Max: 36.9 (07-08-24 @ 06:33)  T(F): 97.9 (07-08-24 @ 21:41), Max: 98.5 (07-08-24 @ 06:33)  HR: 62 (07-08-24 @ 21:41) (59 - 66)  BP: 133/74 (07-08-24 @ 21:41) (124/65 - 149/70)  RR: 15 (07-08-24 @ 21:41) (15 - 16)  SpO2: 97% (07-08-24 @ 21:41) (97% - 98%)    Lungs b/l air entry  Heart S1S2  Abd soft ND  Extremities: tr edema  RUE avf                                                                acetaminophen   Oral Liquid .. 650 milliGRAM(s) Oral every 6 hours PRN  albuterol    0.083% 2.5 milliGRAM(s) Nebulizer every 6 hours  amLODIPine   Tablet 10 milliGRAM(s) Oral daily  apixaban 5 milliGRAM(s) Enteral Tube two times a day  aspirin  chewable 81 milliGRAM(s) Oral daily  atorvastatin 20 milliGRAM(s) Oral at bedtime  benzonatate 100 milliGRAM(s) Oral <User Schedule>  bisacodyl Suppository 10 milliGRAM(s) Rectal daily PRN  carvedilol 25 milliGRAM(s) Oral every 12 hours  chlorhexidine 2% Cloths 1 Application(s) Topical <User Schedule>  dextrose 10% Bolus 125 milliLiter(s) IV Bolus once  dextrose 5%. 1000 milliLiter(s) IV Continuous <Continuous>  dextrose 5%. 1000 milliLiter(s) IV Continuous <Continuous>  dextrose 50% Injectable 25 Gram(s) IV Push once  dextrose 50% Injectable 12.5 Gram(s) IV Push once  dextrose Oral Gel 15 Gram(s) Oral once PRN  dextrose Oral Gel 15 Gram(s) Oral once PRN  epoetin reilly-epbx (RETACRIT) Injectable 10840 Unit(s) IV Push <User Schedule>  ferrous    sulfate Liquid 300 milliGRAM(s) Enteral Tube daily  glucagon  Injectable 1 milliGRAM(s) IntraMuscular once  hydrALAZINE 100 milliGRAM(s) Oral three times a day  insulin glargine Injectable (LANTUS) 5 Unit(s) SubCutaneous every morning  insulin lispro (ADMELOG) corrective regimen sliding scale   SubCutaneous three times a day before meals  lidocaine   4% Patch 1 Patch Transdermal every 24 hours  lidocaine   4% Patch 1 Patch Transdermal every 24 hours  melatonin 3 milliGRAM(s) Oral at bedtime PRN  methyl salicylate 15%/menthol 10% Topical Cream 1 Application(s) Topical <User Schedule>  modafinil 50 milliGRAM(s) Oral <User Schedule>  Nephro-noam 1 Tablet(s) Oral daily  pantoprazole   Suspension 40 milliGRAM(s) Enteral Tube two times a day  polyethylene glycol 3350 17 Gram(s) Oral daily  QUEtiapine 12.5 milliGRAM(s) Oral at bedtime  senna 2 Tablet(s) Oral at bedtime    A/P:    ESRD on HD  S/p complicated hospital course as per HPI  S/p CVA, trach, PEG  HD tomorrow   Epoetin w/HD 3 x week  Bld work w/HD   Plan for d/c post HD tomorrow  HD Tue/Wed/Friday this week     [phone]

## 2024-07-08 NOTE — PROGRESS NOTE ADULT - SUBJECTIVE AND OBJECTIVE BOX
Follow-up Pulmonary Progress Note  Chief Complaint : Cerebral infarction        pt comfortble  stoma closed  no secretions      Allergies :No Known Allergies      PAST MEDICAL & SURGICAL HISTORY:  Diabetes    Benign essential HTN    HLD (hyperlipidemia)    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    ESRD on hemodialysis    Arteriovenous fistula        Medications:  MEDICATIONS  (STANDING):  albuterol    0.083% 2.5 milliGRAM(s) Nebulizer every 6 hours  amLODIPine   Tablet 10 milliGRAM(s) Oral daily  apixaban 5 milliGRAM(s) Enteral Tube two times a day  aspirin  chewable 81 milliGRAM(s) Oral daily  atorvastatin 20 milliGRAM(s) Oral at bedtime  benzonatate 100 milliGRAM(s) Oral <User Schedule>  carvedilol 25 milliGRAM(s) Oral every 12 hours  chlorhexidine 2% Cloths 1 Application(s) Topical <User Schedule>  dextrose 10% Bolus 125 milliLiter(s) IV Bolus once  dextrose 5%. 1000 milliLiter(s) (100 mL/Hr) IV Continuous <Continuous>  dextrose 5%. 1000 milliLiter(s) (50 mL/Hr) IV Continuous <Continuous>  dextrose 50% Injectable 25 Gram(s) IV Push once  dextrose 50% Injectable 12.5 Gram(s) IV Push once  epoetin reilly-epbx (RETACRIT) Injectable 97424 Unit(s) IV Push <User Schedule>  ferrous    sulfate Liquid 300 milliGRAM(s) Enteral Tube daily  glucagon  Injectable 1 milliGRAM(s) IntraMuscular once  hydrALAZINE 100 milliGRAM(s) Oral three times a day  insulin glargine Injectable (LANTUS) 5 Unit(s) SubCutaneous every morning  insulin lispro (ADMELOG) corrective regimen sliding scale   SubCutaneous three times a day before meals  lidocaine   4% Patch 1 Patch Transdermal every 24 hours  lidocaine   4% Patch 1 Patch Transdermal every 24 hours  methyl salicylate 15%/menthol 10% Topical Cream 1 Application(s) Topical <User Schedule>  modafinil 50 milliGRAM(s) Oral <User Schedule>  Nephro-noam 1 Tablet(s) Oral daily  pantoprazole   Suspension 40 milliGRAM(s) Enteral Tube two times a day  polyethylene glycol 3350 17 Gram(s) Oral daily  QUEtiapine 12.5 milliGRAM(s) Oral at bedtime  senna 2 Tablet(s) Oral at bedtime    MEDICATIONS  (PRN):  acetaminophen   Oral Liquid .. 650 milliGRAM(s) Oral every 6 hours PRN Moderate Pain (4 - 6)  bisacodyl Suppository 10 milliGRAM(s) Rectal daily PRN Constipation  dextrose Oral Gel 15 Gram(s) Oral once PRN Blood Glucose LESS THAN 70 milliGRAM(s)/deciliter  dextrose Oral Gel 15 Gram(s) Oral once PRN Blood Glucose LESS THAN 70 milliGRAM(s)/deciliter  melatonin 3 milliGRAM(s) Oral at bedtime PRN Insomnia      Antibiotics History  piperacillin/tazobactam IVPB.. 3.375 Gram(s) IV Intermittent every 12 hours, 06-29-24 @ 22:03, Stop order after: 7 Days      Heme Medications        VITALS:  T(C): 36.7 (07-08-24 @ 09:30), Max: 36.9 (07-08-24 @ 06:33)  T(F): 98 (07-08-24 @ 09:30), Max: 98.5 (07-08-24 @ 06:33)  HR: 60 (07-08-24 @ 13:59) (60 - 66)  BP: 149/70 (07-08-24 @ 13:59) (123/68 - 149/70)  BP(mean): --  ABP: --  ABP(mean): --  RR: 16 (07-08-24 @ 09:30) (15 - 16)  SpO2: 98% (07-08-24 @ 10:18) (97% - 98%)  CVP(mm Hg): --  CVP(cm H2O): --    Ins and Outs

## 2024-07-08 NOTE — PROGRESS NOTE ADULT - ASSESSMENT
73-year-old male w/ past medical history hypertension, ESRD (HD MWF)  insulin-dependent DM presented 4/29/24 to [facility] 4/29 with hiccups and demand ischemia, was treated with baclofen, developed what appears to be toxic encephalopathy. He then sustained a prolonged, complex hospital course over approx 6 weeks, notable for acute respiratory failure, intubation, sepsis, CRRT, DVT, GIB, trach and PEG placement, He is admitted for acute multidisciplinary rehab on 6/14/24 to [facility].     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  #Hospital Acquired Debility   #Dysphagia  #Gait, ADL, Functional impairments  - Comprehensive Multidisciplinary Rehab, PT/OT/ SLP 3 hr/day 5d/wk  - AC: Eliquis 5 BID for DVT, seen on CTA/P 5/12  - ASA81 qD  - Pain: PRN Tylenol, Lidocaine patch   - Absolute Avoidance of Baclofen   Anticipate Dc in AM    #Acute Hypoxemic, Hypercapnic Respiratory Failure, s/p ETT intubation  #s/p tracheostomy   - Albuterol neb q6h   - Continue TC w/ ATC  trach out- tolerating Rm air  MBS- advanced to reg with thins via sips      #ESRD on HD  #Fistula stenosis   #Anemia   - Continue HD M/W/F per renal   - Access is RUE AVF  - Regular monitoring of electrolytes  - EPOGEN w/ HD  - Iron supplementation, 300 mg daily   HD in AM ( short run) then resume OPD HD on Weds in [place]    #HTN  - Coreg 25 q12h  - Hydralazine 100 mg TID  - Norvasc 10 mg/D  - Isordil would be next agent added  BPs Stable    #NSTEMI, Demand Ischemia, resolved  - Initially, troponins mildly elevated  - TTE (4/30): EF 72, normal, no regional wall motion abnormalities   - No need to pursue cath at this time   - 20 mg lipitor at bedtime     #IDDM2, A1c 6.9  AM lantus 5 units?  - FS, ISS TIDAC  Glucoses OK with coverage- ? Insulin on Dc    #R Common Iliac DVT  - Initially treated with heparin but then developed questionable GIB, transitioned to Eliquis   - IR was consulted, no plan for IVC filter  - Continue Eliquis 5 mg BID     #GI  - Concern melena 5/27, resolved, transfused   - GI consulted, not candidate for endoscopy due to surgical risk  - Concern for upper GI Bleed, GI felt not true bleed  - Continue PPI BID   - MIralax 17g daily  - Senna 2 @ bedtime   - Ducolax suppository daily PRN   last BM 7/8    #Oropharyngeal Dysphagia  #s/p PEG (5/17)  - Failed green dye 6/1   - Continue feeds: Card Steady 10 cc/hr incr. q6 goal 45cc/hr - feeds changed to Bolus  MBS 6/20- cleared for minced moist with mild thicks  supplement PO with G feeds if needed  speech advanced diet to soft with mild thicks  MBS this AM- cleared for Reg with thins      #Mood / Cognition:  - Neuropsych evaluation given prolonged and complex hospitalization  - Chart mentions concern for depression w/ possible suicidal ideation   no further hallucinations  good sleep with seroquel-will Dc on Discharge  provigil in AM only- will Dc on discharge    #/Bladder:  - Check urinary status on arrival, possibly anuric   spont voiding    #Dysphagia:    - Diet: tube feeds/ soft with mild thicks  SP MBS- reg with thins- no need to DC with PEG feeds to supplement  - Ongoing SLP assessment- speech advanced diet   - Nutrition to follow  OOB For ALL MEALS  GI FU to Dc PEG once tolerates PO    #Skin/ Pressure Injury Prevention:  - Assessment on admission   - Incisions:    #Precautions/ Restrictions  - Falls, Aspiration Precautions   - COVID PCR:

## 2024-07-08 NOTE — PROGRESS NOTE ADULT - ASSESSMENT
Physical Exam   GENERAL:               Alert,  No acute distress.    HEENT:                   No JVD, Dry MM stoma closed  PULM:                     Bilateral air entry, diminished to auscultation bilaterally, no significant sputum production, No Rales, No Rhonchi, No Wheezing  CVS:                         S1, S2,  No Murmur  ABD:                        Soft, nondistended, nontender, normoactive bowel sounds, + PEG   NEURO:                  Alert,  interactive,  follows commands  PSYC:                      Calm,      Assessment  1. Chronic Respiratory failure s/p Trach s/p decanulation 7/5/24  2. AMS appears to have improved , ? Baclofen toxicity vs CVA  3. ESRD on HD, DM 2,   4. Right iliac DVT on Eliquis      Plan   can keep stoma open to air  can shower  monitor secretions if any  monitor on room air       continue Eliquis for DVT   will follow as needed

## 2024-07-08 NOTE — DISCHARGE NOTE NURSING/CASE MANAGEMENT/SOCIAL WORK - NSDCPEELIQUISREACT_GEN_ALL_CORE
Epidural Apixaban/Eliquis increases your risk for bleeding. Notify your doctor if you experience any of the following side effects: bleeding, coughing or vomiting blood, red or black stool, unexpected pain or swelling, itching or hives, chest pain, chest tightness, trouble breathing, changes in how much or how often you urinate, red or pink urine, numbness or tingling in your feet, or unusual muscle weakness. When Apixaban/Eliquis is taken with other medicines, they can affect how it works. Taking other medications such as aspirin, blood thinners, nonsteroidal anti-inflammatories, and medications that treat depression can increase your risk of bleeding. It is very important to tell your health care provider about all of the other medicines, including over-the-counter medications, herbs, and vitamins you are taking. DO NOT start, stop, or change the dosage of any medicine, including over-the-counter medicines, vitamins, and herbal products without your doctor’s approval. Any products containing aspirin or are nonsteroidal anti-inflammatories lessen the blood’s ability to form clots and add to the effect of Apixaban/Eliquis. Never take aspirin or medicines that contain aspirin without speaking to your doctor.

## 2024-07-08 NOTE — PROGRESS NOTE ADULT - ASSESSMENT
73M HTN, ESRD (HD MWF via AVG) DM who was admitted for unstable angina. Course complicated by AMS secondary to acute CVA vs Baclofen toxicity requiring intubation s/p Trach and PEG. Also c/b DVT and GIB. Notable course for AV Fistula dysfunction requiring balloon angioplasty.    #Recent AMS suspect due to baclofen toxicity vs CVA  #Acute CVA  - MRI head 5/6 showed acute infarct in left talya and left cerebellum   - continue holding baclofen  - continue ASA and statin  - continue Modafinil    #Acute Hypoxemic, Hypercapnic Respiratory Failure, s/p ETT intubation  #increased secretions  #Aspiration PNA?  - s/p course of zosyn  - scopolamine patch  - s/p tracheostomy   - Albuterol neb q6h   - pulm following  - oral suction  - trach decannulated on 7/5  - COVID and RVP negative    #ESRD on HD MWF via RT AFF  #Anemia  - stable, continue HD  - monitor H/H    #R Common Iliac DVT  - CT AP (5/12) with nonocclusive RIGHT common iliac vein deep venous thrombosis  - Continue Eliquis 5 mg BID     #Hallucination  - Seroquel 12.5mg HS  - Psych following    #HTN  - Coreg 25 q12h  - Hydralazine 100 mg TID  - Norvasc 10 mg/D  - BP acceptable     #DM2   - A1c 6.9  - Lantus 5 qAM, ISS    #Oropharyngeal Dysphagia  - s/p PEG (5/17)  - Continue PO diet and supplement with TF if po intake is poor    DVT PPX: Eliquis

## 2024-07-08 NOTE — CHART NOTE - NSCHARTNOTEFT_GEN_A_CORE
-Patient did not have hemodialysis today, due to go tomorrow. Pt to labs completed at HD tomorrow, labs reordered for 7/9.   -Discussed with overnight on call rehab resident.

## 2024-07-08 NOTE — DISCHARGE NOTE NURSING/CASE MANAGEMENT/SOCIAL WORK - PATIENT PORTAL LINK FT
You can access the FollowMyHealth Patient Portal offered by [facility] by registering at the following website: [email or web address]. By joining [employer]’s FollowMyHealth portal, you will also be able to view your health information using other applications (apps) compatible with our system.

## 2024-07-08 NOTE — DISCHARGE NOTE NURSING/CASE MANAGEMENT/SOCIAL WORK - NSDCFUADDAPPT_GEN_ALL_CORE_FT
[facility] Pulmonolgy and Sleep Medicine  Pulmonology  [address], [address]  [place] [address]  Phone: [phone]  Fax:     [facility] Psychiatry  Psychiatry  [address] [place] [id number]  Phone: [phone]

## 2024-07-08 NOTE — PROGRESS NOTE ADULT - SUBJECTIVE AND OBJECTIVE BOX
Patient is a 73y old  Male who presents with a chief complaint of CVA, Encephalopathy (07 Jul 2024 08:26)    Patient seen and examined at bedside. No acute overnight events.   Denies pain/discomfort.     ALLERGIES:  No Known Allergies    MEDICATIONS  (STANDING):  albuterol    0.083% 2.5 milliGRAM(s) Nebulizer every 6 hours  amLODIPine   Tablet 10 milliGRAM(s) Oral daily  apixaban 5 milliGRAM(s) Enteral Tube two times a day  aspirin  chewable 81 milliGRAM(s) Oral daily  atorvastatin 20 milliGRAM(s) Oral at bedtime  benzonatate 100 milliGRAM(s) Oral <User Schedule>  carvedilol 25 milliGRAM(s) Oral every 12 hours  chlorhexidine 2% Cloths 1 Application(s) Topical <User Schedule>  dextrose 10% Bolus 125 milliLiter(s) IV Bolus once  dextrose 5%. 1000 milliLiter(s) (100 mL/Hr) IV Continuous <Continuous>  dextrose 5%. 1000 milliLiter(s) (50 mL/Hr) IV Continuous <Continuous>  dextrose 50% Injectable 25 Gram(s) IV Push once  dextrose 50% Injectable 12.5 Gram(s) IV Push once  epoetin reilly-epbx (RETACRIT) Injectable 00221 Unit(s) IV Push <User Schedule>  ferrous    sulfate Liquid 300 milliGRAM(s) Enteral Tube daily  glucagon  Injectable 1 milliGRAM(s) IntraMuscular once  hydrALAZINE 100 milliGRAM(s) Oral three times a day  insulin glargine Injectable (LANTUS) 5 Unit(s) SubCutaneous every morning  insulin lispro (ADMELOG) corrective regimen sliding scale   SubCutaneous three times a day before meals  lidocaine   4% Patch 1 Patch Transdermal every 24 hours  lidocaine   4% Patch 1 Patch Transdermal every 24 hours  methyl salicylate 15%/menthol 10% Topical Cream 1 Application(s) Topical <User Schedule>  modafinil 50 milliGRAM(s) Oral <User Schedule>  Nephro-noam 1 Tablet(s) Oral daily  pantoprazole   Suspension 40 milliGRAM(s) Enteral Tube two times a day  polyethylene glycol 3350 17 Gram(s) Oral daily  QUEtiapine 12.5 milliGRAM(s) Oral at bedtime  senna 2 Tablet(s) Oral at bedtime    MEDICATIONS  (PRN):  acetaminophen   Oral Liquid .. 650 milliGRAM(s) Oral every 6 hours PRN Moderate Pain (4 - 6)  bisacodyl Suppository 10 milliGRAM(s) Rectal daily PRN Constipation  dextrose Oral Gel 15 Gram(s) Oral once PRN Blood Glucose LESS THAN 70 milliGRAM(s)/deciliter  dextrose Oral Gel 15 Gram(s) Oral once PRN Blood Glucose LESS THAN 70 milliGRAM(s)/deciliter  melatonin 3 milliGRAM(s) Oral at bedtime PRN Insomnia    Vital Signs Last 24 Hrs  T(F): 98 (08 Jul 2024 09:30), Max: 98.5 (08 Jul 2024 06:33)  HR: 65 (08 Jul 2024 09:30) (63 - 66)  BP: 128/64 (08 Jul 2024 09:30) (123/68 - 146/65)  RR: 16 (08 Jul 2024 09:30) (15 - 16)  SpO2: 98% (08 Jul 2024 09:30) (97% - 98%)  I&O's Summary    PHYSICAL EXAM:  General: NAD, awake, alert thin M  ENT: MMM, no tonsilar exudate  Neck: Supple, No JVD  Lungs: Clear to auscultation bilaterally, no wheezes. Good air entry bilaterally   Cardio: RRR, S1/S2, No murmurs  Abdomen: Soft, Nontender, Nondistended; Bowel sounds present  Extremities: No calf tenderness, No pitting edema; AVF +thrill    LABS:                        8.3    7.48  )-----------( 305      ( 06 Jul 2024 14:40 )             25.4       07-06    135  |  96  |  50  ----------------------------<  214  3.9   |  28  |  7.63    Ca    8.3      06 Jul 2024 14:40  Phos  5.4     07-06    05-17 Chol 86 mg/dL LDL -- HDL 27 mg/dL Trig 151 mg/dL    POCT Blood Glucose.: 157 mg/dL (08 Jul 2024 07:44)  POCT Blood Glucose.: 142 mg/dL (07 Jul 2024 22:31)  POCT Blood Glucose.: 138 mg/dL (07 Jul 2024 17:25)  POCT Blood Glucose.: 170 mg/dL (07 Jul 2024 11:50)    Urinalysis Basic - ( 06 Jul 2024 14:40 )    Color: x / Appearance: x / SG: x / pH: x  Gluc: 214 mg/dL / Ketone: x  / Bili: x / Urobili: x   Blood: x / Protein: x / Nitrite: x   Leuk Esterase: x / RBC: x / WBC x   Sq Epi: x / Non Sq Epi: x / Bacteria: x    COVID-19 PCR: NotDetec (07-07-24 @ 04:30)  COVID-19 PCR: NotDetec (06-14-24 @ 18:56)    RADIOLOGY & ADDITIONAL TESTS:     Care Discussed with Consultants/Other Providers:

## 2024-07-09 LAB
ANION GAP SERPL CALC-SCNC: 9 MMOL/L — SIGNIFICANT CHANGE UP (ref 5–17)
BUN SERPL-MCNC: 44 MG/DL — HIGH (ref 7–23)
CALCIUM SERPL-MCNC: 9.1 MG/DL — SIGNIFICANT CHANGE UP (ref 8.4–10.5)
CHLORIDE SERPL-SCNC: 98 MMOL/L — SIGNIFICANT CHANGE UP (ref 96–108)
CO2 SERPL-SCNC: 30 MMOL/L — SIGNIFICANT CHANGE UP (ref 22–31)
CREAT SERPL-MCNC: 8.33 MG/DL — HIGH (ref 0.5–1.3)
EGFR: 6 ML/MIN/1.73M2 — LOW
GLUCOSE BLDC GLUCOMTR-MCNC: 110 MG/DL — HIGH (ref 70–99)
GLUCOSE BLDC GLUCOMTR-MCNC: 147 MG/DL — HIGH (ref 70–99)
GLUCOSE BLDC GLUCOMTR-MCNC: 170 MG/DL — HIGH (ref 70–99)
GLUCOSE BLDC GLUCOMTR-MCNC: 177 MG/DL — HIGH (ref 70–99)
GLUCOSE SERPL-MCNC: 240 MG/DL — HIGH (ref 70–99)
HCT VFR BLD CALC: 28.4 % — LOW (ref 39–50)
HGB BLD-MCNC: 9 G/DL — LOW (ref 13–17)
MCHC RBC-ENTMCNC: 22.3 PG — LOW (ref 27–34)
MCHC RBC-ENTMCNC: 31.7 GM/DL — LOW (ref 32–36)
MCV RBC AUTO: 70.3 FL — LOW (ref 80–100)
NRBC # BLD: 0 /100 WBCS — SIGNIFICANT CHANGE UP (ref 0–0)
PHOSPHATE SERPL-MCNC: 5.7 MG/DL — HIGH (ref 2.5–4.5)
PLATELET # BLD AUTO: 330 K/UL — SIGNIFICANT CHANGE UP (ref 150–400)
POTASSIUM SERPL-MCNC: 3.8 MMOL/L — SIGNIFICANT CHANGE UP (ref 3.5–5.3)
POTASSIUM SERPL-SCNC: 3.8 MMOL/L — SIGNIFICANT CHANGE UP (ref 3.5–5.3)
RBC # BLD: 4.04 M/UL — LOW (ref 4.2–5.8)
RBC # FLD: 20.5 % — HIGH (ref 10.3–14.5)
SARS-COV-2 RNA SPEC QL NAA+PROBE: DETECTED
SODIUM SERPL-SCNC: 137 MMOL/L — SIGNIFICANT CHANGE UP (ref 135–145)
WBC # BLD: 9.33 K/UL — SIGNIFICANT CHANGE UP (ref 3.8–10.5)
WBC # FLD AUTO: 9.33 K/UL — SIGNIFICANT CHANGE UP (ref 3.8–10.5)

## 2024-07-09 PROCEDURE — 71045 X-RAY EXAM CHEST 1 VIEW: CPT | Mod: 26

## 2024-07-09 PROCEDURE — 99232 SBSQ HOSP IP/OBS MODERATE 35: CPT

## 2024-07-09 PROCEDURE — 99233 SBSQ HOSP IP/OBS HIGH 50: CPT | Mod: GC

## 2024-07-09 RX ORDER — DEXAMETHASONE 1 MG/1
6 TABLET ORAL DAILY
Refills: 0 | Status: DISCONTINUED | OUTPATIENT
Start: 2024-07-09 | End: 2024-07-10

## 2024-07-09 RX ORDER — ISOPROPYL ALCOHOL, BENZOCAINE .7; .06 ML/ML; ML/ML
0 SWAB TOPICAL
Qty: 100 | Refills: 1
Start: 2024-07-09

## 2024-07-09 RX ORDER — ERYTHROPOIETIN 4000 [IU]/ML
10000 INJECTION, SOLUTION INTRAVENOUS; SUBCUTANEOUS ONCE
Refills: 0 | Status: COMPLETED | OUTPATIENT
Start: 2024-07-09 | End: 2024-07-09

## 2024-07-09 RX ADMIN — HYDRALAZINE HYDROCHLORIDE 100 MILLIGRAM(S): 50 TABLET ORAL at 13:33

## 2024-07-09 RX ADMIN — Medication 1 TABLET(S): at 13:34

## 2024-07-09 RX ADMIN — INSULIN LISPRO 1: 100 INJECTION, SOLUTION SUBCUTANEOUS at 17:16

## 2024-07-09 RX ADMIN — Medication 300 MILLIGRAM(S): at 13:33

## 2024-07-09 RX ADMIN — MODAFINIL 50 MILLIGRAM(S): 100 TABLET ORAL at 05:29

## 2024-07-09 RX ADMIN — PANTOPRAZOLE SODIUM 40 MILLIGRAM(S): 40 INJECTION, POWDER, FOR SOLUTION INTRAVENOUS at 05:30

## 2024-07-09 RX ADMIN — INSULIN LISPRO 1: 100 INJECTION, SOLUTION SUBCUTANEOUS at 08:26

## 2024-07-09 RX ADMIN — APIXABAN 5 MILLIGRAM(S): 5 TABLET, FILM COATED ORAL at 05:29

## 2024-07-09 RX ADMIN — BENZONATATE 100 MILLIGRAM(S): 100 TABLET ORAL at 21:16

## 2024-07-09 RX ADMIN — Medication 2 TABLET(S): at 21:16

## 2024-07-09 RX ADMIN — PANTOPRAZOLE SODIUM 40 MILLIGRAM(S): 40 INJECTION, POWDER, FOR SOLUTION INTRAVENOUS at 17:13

## 2024-07-09 RX ADMIN — Medication 2.5 MILLIGRAM(S): at 15:24

## 2024-07-09 RX ADMIN — CARVEDILOL PHOSPHATE 25 MILLIGRAM(S): 80 CAPSULE, EXTENDED RELEASE ORAL at 17:12

## 2024-07-09 RX ADMIN — MENTHOL, METHYL SALICYLATE 1 APPLICATION(S): 63; 210 PATCH PERCUTANEOUS; TOPICAL; TRANSDERMAL at 13:38

## 2024-07-09 RX ADMIN — ATORVASTATIN CALCIUM 20 MILLIGRAM(S): 20 TABLET, FILM COATED ORAL at 21:16

## 2024-07-09 RX ADMIN — AMLODIPINE BESYLATE 10 MILLIGRAM(S): 2.5 TABLET ORAL at 05:30

## 2024-07-09 RX ADMIN — APIXABAN 5 MILLIGRAM(S): 5 TABLET, FILM COATED ORAL at 17:13

## 2024-07-09 RX ADMIN — Medication 650 MILLIGRAM(S): at 18:25

## 2024-07-09 RX ADMIN — Medication 1 APPLICATION(S): at 05:30

## 2024-07-09 RX ADMIN — Medication 2.5 MILLIGRAM(S): at 06:04

## 2024-07-09 RX ADMIN — Medication 650 MILLIGRAM(S): at 19:23

## 2024-07-09 RX ADMIN — DEXAMETHASONE 6 MILLIGRAM(S): 1 TABLET ORAL at 17:12

## 2024-07-09 RX ADMIN — Medication 12.5 MILLIGRAM(S): at 21:17

## 2024-07-09 RX ADMIN — ERYTHROPOIETIN 10000 UNIT(S): 4000 INJECTION, SOLUTION INTRAVENOUS; SUBCUTANEOUS at 10:37

## 2024-07-09 RX ADMIN — HYDRALAZINE HYDROCHLORIDE 100 MILLIGRAM(S): 50 TABLET ORAL at 05:29

## 2024-07-09 RX ADMIN — Medication 2.5 MILLIGRAM(S): at 22:01

## 2024-07-09 RX ADMIN — CARVEDILOL PHOSPHATE 25 MILLIGRAM(S): 80 CAPSULE, EXTENDED RELEASE ORAL at 05:29

## 2024-07-09 RX ADMIN — HYDRALAZINE HYDROCHLORIDE 100 MILLIGRAM(S): 50 TABLET ORAL at 21:16

## 2024-07-09 RX ADMIN — Medication 2.5 MILLIGRAM(S): at 08:34

## 2024-07-09 RX ADMIN — ASPIRIN 81 MILLIGRAM(S): 325 TABLET, FILM COATED ORAL at 13:33

## 2024-07-09 RX ADMIN — INSULIN GLARGINE 5 UNIT(S): 100 INJECTION, SOLUTION SUBCUTANEOUS at 08:25

## 2024-07-09 NOTE — PROGRESS NOTE ADULT - SUBJECTIVE AND OBJECTIVE BOX
73-year-old male w/ past medical history hypertension, ESRD (HD MWF)  insulin-dependent DM presented 4/29/24 to [facility] with hiccups, chest pain of several days duration, admitted for concern demand ischemia. Early on, he was given baclofen for his hiccups, but soon developed AMS with 2 RRTs by 5/2/24, prompting intubation, upgrade to MICU. There was concern that baclofen toxicity was the culprit of his then-encephalopathic state, as patient had also missed a HD session due to a clotted fistula.     In coming days, he was treated empirically with 5 days of zosyn, had a negative CTH and LP, and had an EEG that did not capture a seizure but showed diffuse non-specific cerebral dysfunction - c/w toxic-metabolic encephalopathies. On 5/6, an MRI showed that the patient had sustained R pontine and cerebellar stroke. He was extubated on 5/9, but owing to a challenging extubation became septic w/ B Cereus, treated with Vanc. He had trach placed 5/14, PEG 5/17, then downgraded.     On the floors, he was followed by several services, including Neurology, who mention the strokes were embolic appearing. Subsequent hospital course notable for nonocclusive R common iliac DVT, for which he was started on a heparin drip which was stopped when patient developed an upper GI Bleed, though GI was consulted and felt he did not have an overt bleed. His dialysis fistula site was stenotic, s/p fistulogram, ultimately changed to a RUE AVF 6/5. He was ultimately started on Eliquis 6/7 for the DVT. Medically stable, he is admitted to [facility] on 6/14/24.     ROS/subjective:  patient seen and evaluated bedside this afternoon after returning from Hemodialysis  on nasal O2- poor mobilization of secretions  O2 sats 80-85% on rm air- in no distress  Sats93% on 3L  no dizziness, no headaches, no SOB, No Chest pain, No nausea, no vomiting  no teeth pain  last BM 7/8  COVID PCR sent- +      MEDICATIONS  (STANDING):  albuterol    0.083% 2.5 milliGRAM(s) Nebulizer every 6 hours  amLODIPine   Tablet 10 milliGRAM(s) Oral daily  apixaban 5 milliGRAM(s) Enteral Tube two times a day  aspirin  chewable 81 milliGRAM(s) Oral daily  atorvastatin 20 milliGRAM(s) Oral at bedtime  benzonatate 100 milliGRAM(s) Oral <User Schedule>  carvedilol 25 milliGRAM(s) Oral every 12 hours  chlorhexidine 2% Cloths 1 Application(s) Topical <User Schedule>  dexAMETHasone     Tablet 6 milliGRAM(s) Oral daily  dextrose 10% Bolus 125 milliLiter(s) IV Bolus once  dextrose 5%. 1000 milliLiter(s) (50 mL/Hr) IV Continuous <Continuous>  dextrose 5%. 1000 milliLiter(s) (100 mL/Hr) IV Continuous <Continuous>  dextrose 50% Injectable 25 Gram(s) IV Push once  dextrose 50% Injectable 12.5 Gram(s) IV Push once  epoetin reilly-epbx (RETACRIT) Injectable 75924 Unit(s) IV Push <User Schedule>  ferrous    sulfate Liquid 300 milliGRAM(s) Enteral Tube daily  glucagon  Injectable 1 milliGRAM(s) IntraMuscular once  hydrALAZINE 100 milliGRAM(s) Oral three times a day  insulin glargine Injectable (LANTUS) 5 Unit(s) SubCutaneous every morning  insulin lispro (ADMELOG) corrective regimen sliding scale   SubCutaneous three times a day before meals  lidocaine   4% Patch 1 Patch Transdermal every 24 hours  lidocaine   4% Patch 1 Patch Transdermal every 24 hours  methyl salicylate 15%/menthol 10% Topical Cream 1 Application(s) Topical <User Schedule>  modafinil 50 milliGRAM(s) Oral <User Schedule>  Nephro-noam 1 Tablet(s) Oral daily  pantoprazole   Suspension 40 milliGRAM(s) Enteral Tube two times a day  polyethylene glycol 3350 17 Gram(s) Oral daily  QUEtiapine 12.5 milliGRAM(s) Oral at bedtime  senna 2 Tablet(s) Oral at bedtime    MEDICATIONS  (PRN):  acetaminophen   Oral Liquid .. 650 milliGRAM(s) Oral every 6 hours PRN Moderate Pain (4 - 6)  bisacodyl Suppository 10 milliGRAM(s) Rectal daily PRN Constipation  dextrose Oral Gel 15 Gram(s) Oral once PRN Blood Glucose LESS THAN 70 milliGRAM(s)/deciliter  dextrose Oral Gel 15 Gram(s) Oral once PRN Blood Glucose LESS THAN 70 milliGRAM(s)/deciliter  melatonin 3 milliGRAM(s) Oral at bedtime PRN Insomnia                            9.0    9.33  )-----------( 330      ( 09 Jul 2024 10:00 )             28.4     07-09    137  |  98  |  44<H>  ----------------------------<  240<H>  3.8   |  30  |  8.33<H>    Ca    9.1      09 Jul 2024 10:00  Phos  5.7     07-09      Urinalysis Basic - ( 09 Jul 2024 10:00 )    Color: x / Appearance: x / SG: x / pH: x  Gluc: 240 mg/dL / Ketone: x  / Bili: x / Urobili: x   Blood: x / Protein: x / Nitrite: x   Leuk Esterase: x / RBC: x / WBC x   Sq Epi: x / Non Sq Epi: x / Bacteria: x    CXR- unofficially no infiltrate    CAPILLARY BLOOD GLUCOSE      POCT Blood Glucose.: 177 mg/dL (09 Jul 2024 16:50)  POCT Blood Glucose.: 110 mg/dL (09 Jul 2024 13:32)  POCT Blood Glucose.: 170 mg/dL (09 Jul 2024 08:00)  POCT Blood Glucose.: 195 mg/dL (08 Jul 2024 20:36)      Vital Signs Last 24 Hrs  T(C): 38.3 (09 Jul 2024 17:17), Max: 38.3 (09 Jul 2024 17:17)  T(F): 100.9 (09 Jul 2024 17:17), Max: 100.9 (09 Jul 2024 17:17)  HR: 100 (09 Jul 2024 17:17) (55 - 100)  BP: 180/70 (09 Jul 2024 17:17) (117/63 - 180/70)  BP(mean): --  RR: 16 (09 Jul 2024 17:17) (15 - 16)  SpO2: 97% (09 Jul 2024 17:17) (90% - 100%)    Parameters below as of 09 Jul 2024 17:17  Patient On (Oxygen Delivery Method): nasal cannula  O2 Flow (L/min): 3    Constitutional - NAD, trach out verbalizes well - sl lethargic on nasal O2  HEENT - NCAT,   Neck - Supple, No limited ROM  Chest - [place]/ rhonchi noted- diminished BS  Cardio - warm and well perfused,   Abdomen -  Soft, NTND. + peg.   Extremities - No peripheral edema, No calf tenderness. RUE fistula.   Neurologic Exam:                    Cognitive -      Cranial Nerves - No facial asymmetry, Tongue midline, Shoulder shrug intact no facial droop     Motor -                      LEFT    UE - ShAB 4/5, EF 4/5, EE 3/5, WE 4/5,  WNL                    RIGHT UE - ShAB 4/5, EF 4/5, EE 3/5, WE 4/5,  WNL                    LEFT    LE - HF 3/5, KE 4/5, DF 4/5, PF 4/5                    RIGHT LE - HF 3/5, KE 4/5, DF 4/5, PF 4/5        Sensory - Intact to LT bilateral in face, arm and legs.      Reflexes - DTR 1 / 4 biceps symmetric. neg Latham's b/l, No clonus.  Psychiatric - Mood stable, Affect WNL  Skin on admission: no sacral pressure injuries.      IDT in [place] 7/3  Tentative DC 7/9 to home-  family training on Friday

## 2024-07-09 NOTE — PROGRESS NOTE ADULT - SUBJECTIVE AND OBJECTIVE BOX
Seen on HD, later events noted, s/p desat late afternoon     Vital Signs Last 24 Hrs  T(C): 37.3 (07-09-24 @ 21:44), Max: 38.3 (07-09-24 @ 17:17)  T(F): 99.1 (07-09-24 @ 21:44), Max: 100.9 (07-09-24 @ 17:17)  HR: 73 (07-09-24 @ 21:44) (55 - 103)  BP: 148/72 (07-09-24 @ 21:44) (117/63 - 180/70)  RR: 16 (07-09-24 @ 21:44) (15 - 16)  SpO2: 99% (07-09-24 @ 21:44) (90% - 100%)    I&O's Detail    09 Jul 2024 07:01  -  09 Jul 2024 23:54  --------------------------------------------------------  OUT:    Other (mL): 1500 mL  Total OUT: 1500 mL    Lungs b/l air entry  Heart S1S2  Abd soft ND  Extremities: tr edema  RUE avf                                                                                    9.0    9.33  )-----------( 330      ( 09 Jul 2024 10:00 )             28.4     09 Jul 2024 10:00    137    |  98     |  44     ----------------------------<  240    3.8     |  30     |  8.33     Ca    9.1        09 Jul 2024 10:00  Phos  5.7       09 Jul 2024 10:00    acetaminophen   Oral Liquid .. 650 milliGRAM(s) Oral every 6 hours PRN  albuterol    0.083% 2.5 milliGRAM(s) Nebulizer every 6 hours  amLODIPine   Tablet 10 milliGRAM(s) Oral daily  apixaban 5 milliGRAM(s) Enteral Tube two times a day  aspirin  chewable 81 milliGRAM(s) Oral daily  atorvastatin 20 milliGRAM(s) Oral at bedtime  benzonatate 100 milliGRAM(s) Oral <User Schedule>  bisacodyl Suppository 10 milliGRAM(s) Rectal daily PRN  carvedilol 25 milliGRAM(s) Oral every 12 hours  chlorhexidine 2% Cloths 1 Application(s) Topical <User Schedule>  dexAMETHasone     Tablet 6 milliGRAM(s) Oral daily  dextrose 10% Bolus 125 milliLiter(s) IV Bolus once  dextrose 5%. 1000 milliLiter(s) IV Continuous <Continuous>  dextrose 5%. 1000 milliLiter(s) IV Continuous <Continuous>  dextrose 50% Injectable 12.5 Gram(s) IV Push once  dextrose 50% Injectable 25 Gram(s) IV Push once  dextrose Oral Gel 15 Gram(s) Oral once PRN  dextrose Oral Gel 15 Gram(s) Oral once PRN  epoetin reilly-epbx (RETACRIT) Injectable 09713 Unit(s) IV Push <User Schedule>  ferrous    sulfate Liquid 300 milliGRAM(s) Enteral Tube daily  glucagon  Injectable 1 milliGRAM(s) IntraMuscular once  hydrALAZINE 100 milliGRAM(s) Oral three times a day  insulin glargine Injectable (LANTUS) 5 Unit(s) SubCutaneous every morning  insulin lispro (ADMELOG) corrective regimen sliding scale   SubCutaneous three times a day before meals  lidocaine   4% Patch 1 Patch Transdermal every 24 hours  lidocaine   4% Patch 1 Patch Transdermal every 24 hours  melatonin 3 milliGRAM(s) Oral at bedtime PRN  methyl salicylate 15%/menthol 10% Topical Cream 1 Application(s) Topical <User Schedule>  modafinil 50 milliGRAM(s) Oral <User Schedule>  Nephro-noam 1 Tablet(s) Oral daily  pantoprazole   Suspension 40 milliGRAM(s) Enteral Tube two times a day  polyethylene glycol 3350 17 Gram(s) Oral daily  QUEtiapine 12.5 milliGRAM(s) Oral at bedtime  senna 2 Tablet(s) Oral at bedtime    A/P:    ESRD on HD  S/p complicated hospital course as per HPI  S/p CVA, trach, PEG  HD today w/1.5kg fluid removal   Epoetin w/HD 3 x week  Fever, hypoxia, tested positive for COVID  Next HD on Thursday   Will follow     [phone]

## 2024-07-09 NOTE — PROGRESS NOTE ADULT - ASSESSMENT
73-year-old male w/ past medical history hypertension, ESRD (HD MWF)  insulin-dependent DM presented 4/29/24 to [facility] 4/29 with hiccups and demand ischemia, was treated with baclofen, developed what appears to be toxic encephalopathy. He then sustained a prolonged, complex hospital course over approx 6 weeks, notable for acute respiratory failure, intubation, sepsis, CRRT, DVT, GIB, trach and PEG placement, He is admitted for acute multidisciplinary rehab on 6/14/24 to [facility].     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  #Hospital Acquired Debility   #Dysphagia  #Gait, ADL, Functional impairments  - Comprehensive Multidisciplinary Rehab, PT/OT/ SLP 3 hr/day 5d/wk  - AC: Eliquis 5 BID for DVT, seen on CTA/P 5/12  - ASA81 qD  - Pain: PRN Tylenol, Lidocaine patch   - Absolute Avoidance of Baclofen   DC cancelled secondary to COVID+    #Acute Hypoxemic, Hypercapnic Respiratory Failure, s/p ETT intubation  #s/p tracheostomy   - Albuterol neb q6h   - Continue TC w/ ATC  Low grade temps , COVID +- on nasal O2  Started on steroids by hospitalist  If decompensates- need to Dc to Medicine      #ESRD on HD  #Fistula stenosis   #Anemia   - Continue HD M/W/F per renal   - Access is RUE AVF  - Regular monitoring of electrolytes  - EPOGEN w/ HD  - Iron supplementation, 300 mg daily   Continue HD    #HTN  - Coreg 25 q12h  - Hydralazine 100 mg TID  - Norvasc 10 mg/D  - Isordil would be next agent added  BPs Stable    #NSTEMI, Demand Ischemia, resolved  - Initially, troponins mildly elevated  - TTE (4/30): EF 72, normal, no regional wall motion abnormalities   - No need to pursue cath at this time   - 20 mg lipitor at bedtime     #IDDM2, A1c 6.9  AM lantus 5 units  - FS, ISS TIDAC      #R Common Iliac DVT  - Initially treated with heparin but then developed questionable GIB, transitioned to Eliquis   - IR was consulted, no plan for IVC filter  - Continue Eliquis 5 mg BID     #GI  - Concern melena 5/27, resolved, transfused   - GI consulted, not candidate for endoscopy due to surgical risk  - Concern for upper GI Bleed, GI felt not true bleed  - Continue PPI BID   - MIralax 17g daily  - Senna 2 @ bedtime   - Ducolax suppository daily PRN   last BM 7/8    #Oropharyngeal Dysphagia  #s/p PEG (5/17)  - Failed green dye 6/1   - Continue feeds: Card Steady 10 cc/hr incr. q6 goal 45cc/hr - feeds changed to Bolus  MBS 6/20- cleared for minced moist with mild thicks  supplement PO with G feeds if needed  speech advanced diet to soft with mild thicks  MBS this AM- cleared for Reg with thins via teaspoon      #Mood / Cognition:  - Neuropsych evaluation given prolonged and complex hospitalization  - Chart mentions concern for depression w/ possible suicidal ideation   good sleep with seroquel-will Dc on Discharge  provigil in AM only- will Dc on discharge    #/Bladder:  - Check urinary status on arrival, possibly anuric   spont voiding    #Dysphagia:    - Diet: tube feeds/ soft with mild thicks  SP MBS- reg with thins- no need to DC with PEG feeds to supplement  - Ongoing SLP assessment- speech advanced diet   - Nutrition to follow  OOB For ALL MEALS  GI FU to Dc PEG once tolerates PO    #Skin/ Pressure Injury Prevention:  - Assessment on admission   - Incisions:    #Precautions/ Restrictions  - Falls, Aspiration Precautions   - COVID PCR:

## 2024-07-09 NOTE — PROGRESS NOTE ADULT - SUBJECTIVE AND OBJECTIVE BOX
Patient is a 73y old  Male who presents with a chief complaint of rehab (08 Jul 2024 16:55)      SUBJECTIVE / OVERNIGHT EVENTS:  Pt seen and examined at bedside. No acute events overnight.    Allergies    No Known Allergies    Intolerances        MEDICATIONS  (STANDING):  albuterol    0.083% 2.5 milliGRAM(s) Nebulizer every 6 hours  amLODIPine   Tablet 10 milliGRAM(s) Oral daily  apixaban 5 milliGRAM(s) Enteral Tube two times a day  aspirin  chewable 81 milliGRAM(s) Oral daily  atorvastatin 20 milliGRAM(s) Oral at bedtime  benzonatate 100 milliGRAM(s) Oral <User Schedule>  carvedilol 25 milliGRAM(s) Oral every 12 hours  chlorhexidine 2% Cloths 1 Application(s) Topical <User Schedule>  dextrose 10% Bolus 125 milliLiter(s) IV Bolus once  dextrose 5%. 1000 milliLiter(s) (50 mL/Hr) IV Continuous <Continuous>  dextrose 5%. 1000 milliLiter(s) (100 mL/Hr) IV Continuous <Continuous>  dextrose 50% Injectable 12.5 Gram(s) IV Push once  dextrose 50% Injectable 25 Gram(s) IV Push once  epoetin reilly-epbx (RETACRIT) Injectable 50946 Unit(s) IV Push <User Schedule>  epoetin reilly-epbx (RETACRIT) Injectable 97356 Unit(s) IV Push once  ferrous    sulfate Liquid 300 milliGRAM(s) Enteral Tube daily  glucagon  Injectable 1 milliGRAM(s) IntraMuscular once  hydrALAZINE 100 milliGRAM(s) Oral three times a day  insulin glargine Injectable (LANTUS) 5 Unit(s) SubCutaneous every morning  insulin lispro (ADMELOG) corrective regimen sliding scale   SubCutaneous three times a day before meals  lidocaine   4% Patch 1 Patch Transdermal every 24 hours  lidocaine   4% Patch 1 Patch Transdermal every 24 hours  methyl salicylate 15%/menthol 10% Topical Cream 1 Application(s) Topical <User Schedule>  modafinil 50 milliGRAM(s) Oral <User Schedule>  Nephro-noam 1 Tablet(s) Oral daily  pantoprazole   Suspension 40 milliGRAM(s) Enteral Tube two times a day  polyethylene glycol 3350 17 Gram(s) Oral daily  QUEtiapine 12.5 milliGRAM(s) Oral at bedtime  senna 2 Tablet(s) Oral at bedtime    MEDICATIONS  (PRN):  acetaminophen   Oral Liquid .. 650 milliGRAM(s) Oral every 6 hours PRN Moderate Pain (4 - 6)  bisacodyl Suppository 10 milliGRAM(s) Rectal daily PRN Constipation  dextrose Oral Gel 15 Gram(s) Oral once PRN Blood Glucose LESS THAN 70 milliGRAM(s)/deciliter  dextrose Oral Gel 15 Gram(s) Oral once PRN Blood Glucose LESS THAN 70 milliGRAM(s)/deciliter  melatonin 3 milliGRAM(s) Oral at bedtime PRN Insomnia      Vital Signs Last 24 Hrs  T(C): 36.7 (09 Jul 2024 08:56), Max: 36.7 (09 Jul 2024 08:56)  T(F): 98 (09 Jul 2024 08:56), Max: 98 (09 Jul 2024 08:56)  HR: 58 (09 Jul 2024 08:56) (58 - 62)  BP: 117/63 (09 Jul 2024 08:56) (117/63 - 150/72)  BP(mean): --  RR: 16 (09 Jul 2024 08:56) (15 - 16)  SpO2: 98% (09 Jul 2024 08:56) (97% - 98%)    Parameters below as of 09 Jul 2024 08:56  Patient On (Oxygen Delivery Method): room air      CAPILLARY BLOOD GLUCOSE      POCT Blood Glucose.: 170 mg/dL (09 Jul 2024 08:00)  POCT Blood Glucose.: 195 mg/dL (08 Jul 2024 20:36)  POCT Blood Glucose.: 120 mg/dL (08 Jul 2024 17:12)  POCT Blood Glucose.: 216 mg/dL (08 Jul 2024 12:09)    I&O's Summary      PHYSICAL EXAM:  GENERAL: NAD, well-developed  HEAD:  Atraumatic, Normocephalic  NECK:  S/p Trach, Supple, No JVD  CHEST/LUNG: Clear to auscultation bilaterally; No wheeze, nonlabored breathing  HEART: Regular rate and rhythm; No murmurs, rubs, or gallops  ABDOMEN: Soft, Nontender, Nondistended; Bowel sounds present  EXTREMITIES: No clubbing, cyanosis, or edema    LABS:                    RADIOLOGY & ADDITIONAL TESTS:  Results Reviewed:   Imaging Personally Reviewed:  Electrocardiogram Personally Reviewed:    COORDINATION OF CARE:  Care Discussed with Consultants/Other Providers [Y/N]:  Prior or Outpatient Records Reviewed [Y/N]:

## 2024-07-09 NOTE — PROGRESS NOTE ADULT - SUBJECTIVE AND OBJECTIVE BOX
Time of Visit:  Patient seen and examined. pat is sitting in bed . o2 via NC     MEDICATIONS  (STANDING):  albuterol    0.083% 2.5 milliGRAM(s) Nebulizer every 6 hours  amLODIPine   Tablet 10 milliGRAM(s) Oral daily  apixaban 5 milliGRAM(s) Enteral Tube two times a day  aspirin  chewable 81 milliGRAM(s) Oral daily  atorvastatin 20 milliGRAM(s) Oral at bedtime  benzonatate 100 milliGRAM(s) Oral <User Schedule>  carvedilol 25 milliGRAM(s) Oral every 12 hours  chlorhexidine 2% Cloths 1 Application(s) Topical <User Schedule>  dexAMETHasone     Tablet 6 milliGRAM(s) Oral daily  dextrose 10% Bolus 125 milliLiter(s) IV Bolus once  dextrose 5%. 1000 milliLiter(s) (100 mL/Hr) IV Continuous <Continuous>  dextrose 5%. 1000 milliLiter(s) (50 mL/Hr) IV Continuous <Continuous>  dextrose 50% Injectable 25 Gram(s) IV Push once  dextrose 50% Injectable 12.5 Gram(s) IV Push once  epoetin reilly-epbx (RETACRIT) Injectable 31974 Unit(s) IV Push <User Schedule>  ferrous    sulfate Liquid 300 milliGRAM(s) Enteral Tube daily  glucagon  Injectable 1 milliGRAM(s) IntraMuscular once  hydrALAZINE 100 milliGRAM(s) Oral three times a day  insulin glargine Injectable (LANTUS) 5 Unit(s) SubCutaneous every morning  insulin lispro (ADMELOG) corrective regimen sliding scale   SubCutaneous three times a day before meals  lidocaine   4% Patch 1 Patch Transdermal every 24 hours  lidocaine   4% Patch 1 Patch Transdermal every 24 hours  methyl salicylate 15%/menthol 10% Topical Cream 1 Application(s) Topical <User Schedule>  modafinil 50 milliGRAM(s) Oral <User Schedule>  Nephro-noam 1 Tablet(s) Oral daily  pantoprazole   Suspension 40 milliGRAM(s) Enteral Tube two times a day  polyethylene glycol 3350 17 Gram(s) Oral daily  QUEtiapine 12.5 milliGRAM(s) Oral at bedtime  senna 2 Tablet(s) Oral at bedtime      MEDICATIONS  (PRN):  acetaminophen   Oral Liquid .. 650 milliGRAM(s) Oral every 6 hours PRN Moderate Pain (4 - 6)  bisacodyl Suppository 10 milliGRAM(s) Rectal daily PRN Constipation  dextrose Oral Gel 15 Gram(s) Oral once PRN Blood Glucose LESS THAN 70 milliGRAM(s)/deciliter  dextrose Oral Gel 15 Gram(s) Oral once PRN Blood Glucose LESS THAN 70 milliGRAM(s)/deciliter  melatonin 3 milliGRAM(s) Oral at bedtime PRN Insomnia       Medications up to date at time of exam.      PHYSICAL EXAMINATION:  Patient has no new complaints.  GENERAL: The patient  in no apparent distress.     Vital Signs Last 24 Hrs  T(C): 38 (09 Jul 2024 18:14), Max: 38.3 (09 Jul 2024 17:17)  T(F): 100.4 (09 Jul 2024 18:14), Max: 100.9 (09 Jul 2024 17:17)  HR: 103 (09 Jul 2024 18:14) (55 - 103)  BP: 175/74 (09 Jul 2024 18:14) (117/63 - 180/70)  BP(mean): --  RR: 16 (09 Jul 2024 18:14) (15 - 16)  SpO2: 98% (09 Jul 2024 18:14) (90% - 100%)    Parameters below as of 09 Jul 2024 18:14  Patient On (Oxygen Delivery Method): nasal cannula  O2 Flow (L/min): 3     (if applicable)    Chest Tube (if applicable)    HEENT: Head is normocephalic and atraumatic. Extraocular muscles are intact. Mucous membranes are moist.     NECK: Supple, no palpable adenopathy. healing stoma . covered     LUNGS: Fair bilateral air entry   no wheezing, rales, or rhonchi.    HEART: Regular rate and rhythm without murmur.    ABDOMEN: Soft, nontender, and nondistended.  No hepatosplenomegaly is noted.    EXTREMITIES: Without any cyanosis, clubbing, rash, lesions or edema.    NEUROLOGIC: Awake, non verbal     SKIN: Warm, dry, good turgor.      LABS:                        9.0    9.33  )-----------( 330      ( 09 Jul 2024 10:00 )             28.4     07-09    137  |  98  |  44<H>  ----------------------------<  240<H>  3.8   |  30  |  8.33<H>    Ca    9.1      09 Jul 2024 10:00  Phos  5.7     07-09        Urinalysis Basic - ( 09 Jul 2024 10:00 )    Color: x / Appearance: x / SG: x / pH: x  Gluc: 240 mg/dL / Ketone: x  / Bili: x / Urobili: x   Blood: x / Protein: x / Nitrite: x   Leuk Esterase: x / RBC: x / WBC x   Sq Epi: x / Non Sq Epi: x / Bacteria: x    MICROBIOLOGY: (if applicable)  CovidCOVID-19 PCR: Detected:       RADIOLOGY & ADDITIONAL STUDIES:  EKG:   CXR: LLL atelectasis vs opacity , f/u official report   ECHO:    IMPRESSION: 73y Male PAST MEDICAL & SURGICAL HISTORY:  Diabetes      Benign essential HTN      HLD (hyperlipidemia)      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      ESRD on hemodialysis      Arteriovenous fistula       p/w       IMP: This is a 73 yr old man with  HTN, ESRD (HD MWF via AVG) DM who was admitted for unstable angina. Course complicated by AMS secondary to acute CVA vs Baclofen toxicity requiring intubation s/p Trach s/p decannulation and PEG. Also c/b DVT and GIB. Notable course for AV Fistula dysfunction requiring balloon angioplasty.  Pat new COVID-19 conversion after being exposed. He is spiking fever with hypoxia and CXR with LLL effusion / vs atelectasis     Sugg    - Continue O2 supp as needed to maintain sat >90%  - Duonebs   - Isolation : contact and airborne   - No remdesivir due to ESRD   - Decadron 6 mg ivp QD x 10 days then taper   - Wound care   - Monitor trach stoma for discharge , redness . keep cover with 2x2 dressing

## 2024-07-09 NOTE — CHART NOTE - NSCHARTNOTEFT_GEN_A_CORE
CC: Hypoxia    Pt seen and examined at bedside at the request of primary team due to hypoxia.  Pt s/p HD session, presented back to bed, noted to have O2 sats in the 80s on RA    Physical exam  NAD, comfortable appearing male. Coughing up phlegm/secretions into wall suction  Lungs: Decreased breath sounds + rhonchi. Nonlabored breathing    Pt w/ recent aspiration PNA, required intubation/extubation. Still noted to have significant secretion. Diet also recently advanced, however, pt RN pt noted to have aspiration w/ it as well. Pt also w/ recent COVID-19 exposure  Multifactorial etiology for acute hypoxia.   -Covid-19 PCR 7/7 negative. Obtain repeat as this could be due to COVID infection  -Follow up with STAT CXR  -Recommend Chest PT to assist in mucus plug excretion  -Hold off on abx as pt nontoxic appearing, no clinical signs/symptoms of acute infectious state. Also recent completion of abx on 7/5    Although as mentioned above, pt nontoxic appearing, pt has an acute change and is dependent on supplemental O2 via NC for now. I would advise to hold off on discharge planning for today and monitor for now

## 2024-07-10 VITALS
SYSTOLIC BLOOD PRESSURE: 137 MMHG | OXYGEN SATURATION: 97 % | RESPIRATION RATE: 20 BRPM | HEART RATE: 69 BPM | DIASTOLIC BLOOD PRESSURE: 55 MMHG

## 2024-07-10 LAB
ALBUMIN SERPL ELPH-MCNC: 2.9 G/DL — LOW (ref 3.3–5)
ALP SERPL-CCNC: 100 U/L — SIGNIFICANT CHANGE UP (ref 40–120)
ALT FLD-CCNC: 9 U/L — LOW (ref 10–45)
ANION GAP SERPL CALC-SCNC: 13 MMOL/L — SIGNIFICANT CHANGE UP (ref 5–17)
APTT BLD: 37.1 SEC — HIGH (ref 24.5–35.6)
AST SERPL-CCNC: 13 U/L — SIGNIFICANT CHANGE UP (ref 10–40)
BASE EXCESS BLDA CALC-SCNC: -6.8 MMOL/L — LOW (ref -2–3)
BASOPHILS # BLD AUTO: 0 K/UL — SIGNIFICANT CHANGE UP (ref 0–0.2)
BASOPHILS NFR BLD AUTO: 0 % — SIGNIFICANT CHANGE UP (ref 0–2)
BILIRUB SERPL-MCNC: 0.5 MG/DL — SIGNIFICANT CHANGE UP (ref 0.2–1.2)
BUN SERPL-MCNC: 39 MG/DL — HIGH (ref 7–23)
CALCIUM SERPL-MCNC: 9.2 MG/DL — SIGNIFICANT CHANGE UP (ref 8.4–10.5)
CHLORIDE SERPL-SCNC: 99 MMOL/L — SIGNIFICANT CHANGE UP (ref 96–108)
CO2 BLDA-SCNC: 19 MMOL/L — SIGNIFICANT CHANGE UP (ref 19–24)
CO2 SERPL-SCNC: 27 MMOL/L — SIGNIFICANT CHANGE UP (ref 22–31)
CREAT SERPL-MCNC: 6.11 MG/DL — HIGH (ref 0.5–1.3)
EGFR: 9 ML/MIN/1.73M2 — LOW
EOSINOPHIL # BLD AUTO: 0 K/UL — SIGNIFICANT CHANGE UP (ref 0–0.5)
EOSINOPHIL NFR BLD AUTO: 0 % — SIGNIFICANT CHANGE UP (ref 0–6)
GAS PNL BLDA: SIGNIFICANT CHANGE UP
GLUCOSE BLDC GLUCOMTR-MCNC: 205 MG/DL — HIGH (ref 70–99)
GLUCOSE BLDC GLUCOMTR-MCNC: 230 MG/DL — HIGH (ref 70–99)
GLUCOSE SERPL-MCNC: 223 MG/DL — HIGH (ref 70–99)
HCO3 BLDA-SCNC: 18 MMOL/L — LOW (ref 21–28)
HCT VFR BLD CALC: 33.3 % — LOW (ref 39–50)
HGB BLD-MCNC: 10.6 G/DL — LOW (ref 13–17)
HOROWITZ INDEX BLDA+IHG-RTO: 100 — SIGNIFICANT CHANGE UP
HYPOCHROMIA BLD QL: SLIGHT — SIGNIFICANT CHANGE UP
INR BLD: 1.83 RATIO — HIGH (ref 0.85–1.18)
LACTATE SERPL-SCNC: 2.3 MMOL/L — HIGH (ref 0.7–2)
LYMPHOCYTES # BLD AUTO: 1.55 K/UL — SIGNIFICANT CHANGE UP (ref 1–3.3)
LYMPHOCYTES # BLD AUTO: 8 % — LOW (ref 13–44)
MAGNESIUM SERPL-MCNC: 2.3 MG/DL — SIGNIFICANT CHANGE UP (ref 1.6–2.6)
MANUAL SMEAR VERIFICATION: SIGNIFICANT CHANGE UP
MCHC RBC-ENTMCNC: 22.7 PG — LOW (ref 27–34)
MCHC RBC-ENTMCNC: 31.8 GM/DL — LOW (ref 32–36)
MCV RBC AUTO: 71.5 FL — LOW (ref 80–100)
MICROCYTES BLD QL: SLIGHT — SIGNIFICANT CHANGE UP
MONOCYTES # BLD AUTO: 1.16 K/UL — HIGH (ref 0–0.9)
MONOCYTES NFR BLD AUTO: 6 % — SIGNIFICANT CHANGE UP (ref 2–14)
NEUTROPHILS # BLD AUTO: 16.67 K/UL — HIGH (ref 1.8–7.4)
NEUTROPHILS NFR BLD AUTO: 83 % — HIGH (ref 43–77)
NEUTS BAND # BLD: 3 % — SIGNIFICANT CHANGE UP (ref 0–8)
NRBC # BLD: 0 /100 WBCS — SIGNIFICANT CHANGE UP (ref 0–0)
PCO2 BLDA: 30 MMHG — LOW (ref 35–48)
PH BLDA: 7.39 — SIGNIFICANT CHANGE UP (ref 7.35–7.45)
PHOSPHATE SERPL-MCNC: 5.2 MG/DL — HIGH (ref 2.5–4.5)
PLAT MORPH BLD: NORMAL — SIGNIFICANT CHANGE UP
PLATELET # BLD AUTO: 351 K/UL — SIGNIFICANT CHANGE UP (ref 150–400)
PO2 BLDA: 81 MMHG — LOW (ref 83–108)
POIKILOCYTOSIS BLD QL AUTO: SLIGHT — SIGNIFICANT CHANGE UP
POTASSIUM SERPL-MCNC: 4.3 MMOL/L — SIGNIFICANT CHANGE UP (ref 3.5–5.3)
POTASSIUM SERPL-SCNC: 4.3 MMOL/L — SIGNIFICANT CHANGE UP (ref 3.5–5.3)
PROT SERPL-MCNC: 7.5 G/DL — SIGNIFICANT CHANGE UP (ref 6–8.3)
PROTHROM AB SERPL-ACNC: 21 SEC — HIGH (ref 9.5–13)
RBC # BLD: 4.66 M/UL — SIGNIFICANT CHANGE UP (ref 4.2–5.8)
RBC # FLD: 21.7 % — HIGH (ref 10.3–14.5)
RBC BLD AUTO: ABNORMAL
SAO2 % BLDA: 97.3 % — SIGNIFICANT CHANGE UP (ref 94–98)
SODIUM SERPL-SCNC: 139 MMOL/L — SIGNIFICANT CHANGE UP (ref 135–145)
WBC # BLD: 19.38 K/UL — HIGH (ref 3.8–10.5)
WBC # FLD AUTO: 19.38 K/UL — HIGH (ref 3.8–10.5)

## 2024-07-10 PROCEDURE — 97161 PT EVAL LOW COMPLEX 20 MIN: CPT | Mod: GP

## 2024-07-10 PROCEDURE — 80053 COMPREHEN METABOLIC PANEL: CPT

## 2024-07-10 PROCEDURE — 99239 HOSP IP/OBS DSCHRG MGMT >30: CPT | Mod: GC

## 2024-07-10 PROCEDURE — 74230 X-RAY XM SWLNG FUNCJ C+: CPT

## 2024-07-10 PROCEDURE — 97110 THERAPEUTIC EXERCISES: CPT | Mod: GP

## 2024-07-10 PROCEDURE — 87635 SARS-COV-2 COVID-19 AMP PRB: CPT

## 2024-07-10 PROCEDURE — 83605 ASSAY OF LACTIC ACID: CPT

## 2024-07-10 PROCEDURE — 84100 ASSAY OF PHOSPHORUS: CPT

## 2024-07-10 PROCEDURE — 99233 SBSQ HOSP IP/OBS HIGH 50: CPT

## 2024-07-10 PROCEDURE — 86706 HEP B SURFACE ANTIBODY: CPT

## 2024-07-10 PROCEDURE — 92523 SPEECH SOUND LANG COMPREHEN: CPT | Mod: GN

## 2024-07-10 PROCEDURE — 80069 RENAL FUNCTION PANEL: CPT

## 2024-07-10 PROCEDURE — 36415 COLL VENOUS BLD VENIPUNCTURE: CPT

## 2024-07-10 PROCEDURE — L8501: CPT

## 2024-07-10 PROCEDURE — 85025 COMPLETE CBC W/AUTO DIFF WBC: CPT

## 2024-07-10 PROCEDURE — 84145 PROCALCITONIN (PCT): CPT

## 2024-07-10 PROCEDURE — 87340 HEPATITIS B SURFACE AG IA: CPT

## 2024-07-10 PROCEDURE — 87077 CULTURE AEROBIC IDENTIFY: CPT

## 2024-07-10 PROCEDURE — 97530 THERAPEUTIC ACTIVITIES: CPT | Mod: GO

## 2024-07-10 PROCEDURE — 94640 AIRWAY INHALATION TREATMENT: CPT

## 2024-07-10 PROCEDURE — 92611 MOTION FLUOROSCOPY/SWALLOW: CPT | Mod: GN

## 2024-07-10 PROCEDURE — 71045 X-RAY EXAM CHEST 1 VIEW: CPT | Mod: 26

## 2024-07-10 PROCEDURE — 97116 GAIT TRAINING THERAPY: CPT | Mod: GP

## 2024-07-10 PROCEDURE — 86803 HEPATITIS C AB TEST: CPT

## 2024-07-10 PROCEDURE — 97112 NEUROMUSCULAR REEDUCATION: CPT | Mod: GO

## 2024-07-10 PROCEDURE — 86704 HEP B CORE ANTIBODY TOTAL: CPT

## 2024-07-10 PROCEDURE — 92610 EVALUATE SWALLOWING FUNCTION: CPT | Mod: GN

## 2024-07-10 PROCEDURE — 0225U NFCT DS DNA&RNA 21 SARSCOV2: CPT

## 2024-07-10 PROCEDURE — 87040 BLOOD CULTURE FOR BACTERIA: CPT

## 2024-07-10 PROCEDURE — 82962 GLUCOSE BLOOD TEST: CPT

## 2024-07-10 PROCEDURE — 71045 X-RAY EXAM CHEST 1 VIEW: CPT

## 2024-07-10 PROCEDURE — 92507 TX SP LANG VOICE COMM INDIV: CPT | Mod: GN

## 2024-07-10 PROCEDURE — 99261: CPT

## 2024-07-10 PROCEDURE — 85027 COMPLETE CBC AUTOMATED: CPT

## 2024-07-10 PROCEDURE — 92526 ORAL FUNCTION THERAPY: CPT | Mod: GN

## 2024-07-10 PROCEDURE — 94799 UNLISTED PULMONARY SVC/PX: CPT

## 2024-07-10 PROCEDURE — 80048 BASIC METABOLIC PNL TOTAL CA: CPT

## 2024-07-10 PROCEDURE — 97535 SELF CARE MNGMENT TRAINING: CPT | Mod: GO

## 2024-07-10 PROCEDURE — 97542 WHEELCHAIR MNGMENT TRAINING: CPT | Mod: GO

## 2024-07-10 PROCEDURE — 97165 OT EVAL LOW COMPLEX 30 MIN: CPT | Mod: GO

## 2024-07-10 PROCEDURE — 87070 CULTURE OTHR SPECIMN AEROBIC: CPT

## 2024-07-10 RX ORDER — ALBUTEROL 90 MCG
2 AEROSOL REFILL (GRAM) INHALATION
Qty: 0 | Refills: 0 | DISCHARGE
Start: 2024-07-10

## 2024-07-10 RX ORDER — MODAFINIL 100 MG/1
50 TABLET ORAL
Refills: 0 | Status: DISCONTINUED | OUTPATIENT
Start: 2024-07-11 | End: 2024-07-10

## 2024-07-10 RX ORDER — ALBUTEROL 90 MCG
2 AEROSOL REFILL (GRAM) INHALATION EVERY 6 HOURS
Refills: 0 | Status: DISCONTINUED | OUTPATIENT
Start: 2024-07-10 | End: 2024-07-10

## 2024-07-10 RX ADMIN — Medication 1 APPLICATION(S): at 05:41

## 2024-07-10 RX ADMIN — ASPIRIN 81 MILLIGRAM(S): 325 TABLET, FILM COATED ORAL at 11:43

## 2024-07-10 RX ADMIN — PANTOPRAZOLE SODIUM 40 MILLIGRAM(S): 40 INJECTION, POWDER, FOR SOLUTION INTRAVENOUS at 07:07

## 2024-07-10 RX ADMIN — INSULIN GLARGINE 5 UNIT(S): 100 INJECTION, SOLUTION SUBCUTANEOUS at 08:06

## 2024-07-10 RX ADMIN — Medication 1 TABLET(S): at 11:43

## 2024-07-10 RX ADMIN — Medication 2.5 MILLIGRAM(S): at 04:19

## 2024-07-10 RX ADMIN — AMLODIPINE BESYLATE 10 MILLIGRAM(S): 2.5 TABLET ORAL at 05:40

## 2024-07-10 RX ADMIN — APIXABAN 5 MILLIGRAM(S): 5 TABLET, FILM COATED ORAL at 05:40

## 2024-07-10 RX ADMIN — INSULIN LISPRO 2: 100 INJECTION, SOLUTION SUBCUTANEOUS at 08:06

## 2024-07-10 RX ADMIN — HYDRALAZINE HYDROCHLORIDE 100 MILLIGRAM(S): 50 TABLET ORAL at 05:39

## 2024-07-10 RX ADMIN — Medication 300 MILLIGRAM(S): at 11:43

## 2024-07-10 RX ADMIN — MODAFINIL 50 MILLIGRAM(S): 100 TABLET ORAL at 05:39

## 2024-07-10 RX ADMIN — DEXAMETHASONE 6 MILLIGRAM(S): 1 TABLET ORAL at 11:43

## 2024-07-10 RX ADMIN — CARVEDILOL PHOSPHATE 25 MILLIGRAM(S): 80 CAPSULE, EXTENDED RELEASE ORAL at 05:40

## 2024-07-10 NOTE — PROGRESS NOTE ADULT - TIME BILLING
- Ordering, reviewing, and interpreting labs, testing, and imaging.  - Independently obtaining a review of systems and performing a physical exam  - Reviewing consultant documentation/recommendations in addition to discussing plan of care with consultants.  - Counselling and educating patient and family regarding interpretation of aforementioned items and plan of care.
review of labs, imaging  review of clinical documents  face to face encounter   coordination of care with primary team  A/P and documentation of encounter
- Ordering, reviewing, and interpreting labs, testing, and imaging.  - Independently obtaining a review of systems and performing a physical exam  - Reviewing prior hospitalization and where necessary, outpatient records.  - Counselling and educating patient and family regarding interpretation of aforementioned items and plan of care.
- Ordering, reviewing, and interpreting labs, testing, and imaging.  - Independently obtaining a review of systems and performing a physical exam  - Reviewing prior hospitalization and where necessary, outpatient records.  - Counselling and educating patient and family regarding interpretation of aforementioned items and plan of care.
MEDICAL COMPLEXITY
Time spent includes direct patient care (interview and examination of patient), discussion with other providers, support staff and/or patient's family members, review of medical records, ordering diagnostic tests and analyzing results, and documentation
review of labs, imaging  review of clinical documents  face to face encounter   coordination of care with primary team  A/P and documentation of encounter
review of labs, imaging  review of clinical documents  face to face encounter   coordination of care with primary team  A/P and documentation of encounter
- Ordering, reviewing, and interpreting labs, testing, and imaging.  - Independently obtaining a review of systems and performing a physical exam  - Reviewing prior hospitalization and where necessary, outpatient records.  - Counselling and educating patient and family regarding interpretation of aforementioned items and plan of care.
review of labs, imaging  review of clinical documents  face to face encounter   coordination of care with primary team  A/P and documentation of encounter
Time spent includes direct patient care  (interview and examination of patient), discussion with other providers, support staff and/or patient's family members, review of medical records, ordering diagnostic tests and analyzing results, and documentation.
review of labs, imaging  review of clinical documents  face to face encounter   coordination of care with primary team  A/P and documentation of encounter

## 2024-07-10 NOTE — PROGRESS NOTE ADULT - SUBJECTIVE AND OBJECTIVE BOX
73-year-old male w/ past medical history hypertension, ESRD (HD MWF)  insulin-dependent DM presented 4/29/24 to [facility] with hiccups, chest pain of several days duration, admitted for concern demand ischemia. Early on, he was given baclofen for his hiccups, but soon developed AMS with 2 RRTs by 5/2/24, prompting intubation, upgrade to MICU. There was concern that baclofen toxicity was the culprit of his then-encephalopathic state, as patient had also missed a HD session due to a clotted fistula.     In coming days, he was treated empirically with 5 days of zosyn, had a negative CTH and LP, and had an EEG that did not capture a seizure but showed diffuse non-specific cerebral dysfunction - c/w toxic-metabolic encephalopathies. On 5/6, an MRI showed that the patient had sustained R pontine and cerebellar stroke. He was extubated on 5/9, but owing to a challenging extubation became septic w/ B Cereus, treated with Vanc. He had trach placed 5/14, PEG 5/17, then downgraded.     On the floors, he was followed by several services, including Neurology, who mention the strokes were embolic appearing. Subsequent hospital course notable for nonocclusive R common iliac DVT, for which he was started on a heparin drip which was stopped when patient developed an upper GI Bleed, though GI was consulted and felt he did not have an overt bleed. His dialysis fistula site was stenotic, s/p fistulogram, ultimately changed to a RUE AVF 6/5. He was ultimately started on Eliquis 6/7 for the DVT. Medically stable, he is admitted to [facility] on 6/14/24.     ROS/subjective:  patient seen and evaluated bedside this morning  awake alert but less talkative- O2 sats 75% on 3l- In 80s on Ventimask  rapid response ordered- for DC to ICU  no dizziness, no headaches, no SOB, No Chest pain, No nausea, no vomiting  no teeth pain  last BM 7/10  COVID +      MEDICATIONS  (STANDING):  albuterol    90 MICROgram(s) HFA Inhaler 2 Puff(s) Inhalation every 6 hours  amLODIPine   Tablet 10 milliGRAM(s) Oral daily  apixaban 5 milliGRAM(s) Enteral Tube two times a day  aspirin  chewable 81 milliGRAM(s) Oral daily  atorvastatin 20 milliGRAM(s) Oral at bedtime  benzonatate 100 milliGRAM(s) Oral <User Schedule>  carvedilol 25 milliGRAM(s) Oral every 12 hours  chlorhexidine 2% Cloths 1 Application(s) Topical <User Schedule>  dexAMETHasone     Tablet 6 milliGRAM(s) Oral daily  dextrose 10% Bolus 125 milliLiter(s) IV Bolus once  dextrose 5%. 1000 milliLiter(s) (100 mL/Hr) IV Continuous <Continuous>  dextrose 5%. 1000 milliLiter(s) (50 mL/Hr) IV Continuous <Continuous>  dextrose 50% Injectable 25 Gram(s) IV Push once  dextrose 50% Injectable 12.5 Gram(s) IV Push once  epoetin reilly-epbx (RETACRIT) Injectable 35112 Unit(s) IV Push <User Schedule>  ferrous    sulfate Liquid 300 milliGRAM(s) Enteral Tube daily  glucagon  Injectable 1 milliGRAM(s) IntraMuscular once  hydrALAZINE 100 milliGRAM(s) Oral three times a day  insulin glargine Injectable (LANTUS) 5 Unit(s) SubCutaneous every morning  insulin lispro (ADMELOG) corrective regimen sliding scale   SubCutaneous three times a day before meals  lidocaine   4% Patch 1 Patch Transdermal every 24 hours  lidocaine   4% Patch 1 Patch Transdermal every 24 hours  methyl salicylate 15%/menthol 10% Topical Cream 1 Application(s) Topical <User Schedule>  Nephro-noam 1 Tablet(s) Oral daily  pantoprazole   Suspension 40 milliGRAM(s) Enteral Tube two times a day  polyethylene glycol 3350 17 Gram(s) Oral daily  QUEtiapine 12.5 milliGRAM(s) Oral at bedtime  senna 2 Tablet(s) Oral at bedtime    MEDICATIONS  (PRN):  acetaminophen   Oral Liquid .. 650 milliGRAM(s) Oral every 6 hours PRN Moderate Pain (4 - 6)  bisacodyl Suppository 10 milliGRAM(s) Rectal daily PRN Constipation  dextrose Oral Gel 15 Gram(s) Oral once PRN Blood Glucose LESS THAN 70 milliGRAM(s)/deciliter  dextrose Oral Gel 15 Gram(s) Oral once PRN Blood Glucose LESS THAN 70 milliGRAM(s)/deciliter  melatonin 3 milliGRAM(s) Oral at bedtime PRN Insomnia                            10.6   19.38 )-----------( 351      ( 10 Jul 2024 11:31 )             33.3     07-10    139  |  99  |  39<H>  ----------------------------<  223<H>  4.3   |  27  |  6.11<H>    Ca    9.2      10 Jul 2024 11:31  Phos  5.2     07-10  Mg     2.3     07-10    TPro  7.5  /  Alb  2.9<L>  /  TBili  0.5  /  DBili  x   /  AST  13  /  ALT  9<L>  /  AlkPhos  100  07-10    Urinalysis Basic - ( 10 Jul 2024 11:31 )    Color: x / Appearance: x / SG: x / pH: x  Gluc: 223 mg/dL / Ketone: x  / Bili: x / Urobili: x   Blood: x / Protein: x / Nitrite: x   Leuk Esterase: x / RBC: x / WBC x   Sq Epi: x / Non Sq Epi: x / Bacteria: x      PT/INR - ( 10 Jul 2024 11:31 )   PT: 21.0 sec;   INR: 1.83 ratio         PTT - ( 10 Jul 2024 11:31 )  PTT:37.1 sec  CAPILLARY BLOOD GLUCOSE      POCT Blood Glucose.: 230 mg/dL (10 Jul 2024 11:22)  POCT Blood Glucose.: 205 mg/dL (10 Jul 2024 08:05)  POCT Blood Glucose.: 147 mg/dL (09 Jul 2024 21:11)  POCT Blood Glucose.: 177 mg/dL (09 Jul 2024 16:50)  POCT Blood Glucose.: 110 mg/dL (09 Jul 2024 13:32)    Blood Gas Profile - Arterial (07.10.24 @ 11:25)    pH, Arterial: 7.39   pCO2, Arterial: 30 mmHg   pO2, Arterial: 81 mmHg   HCO3, Arterial: 18 mmol/L   Base Excess, Arterial: -6.8 mmol/L   Oxygen Saturation, Arterial: 97.3 %   Total CO2, Arterial: 19 mmol/L   FIO2, Arterial: 100.0   Blood Gas Source Arterial: Arterial          Vital Signs Last 24 Hrs  T(C): 36.8 (10 Jul 2024 11:36), Max: 38.3 (09 Jul 2024 17:17)  T(F): 98.3 (10 Jul 2024 11:36), Max: 100.9 (09 Jul 2024 17:17)  HR: 69 (10 Jul 2024 12:00) (63 - 103)  BP: 137/55 (10 Jul 2024 12:00) (113/70 - 180/70)  BP(mean): 83 (10 Jul 2024 12:00) (83 - 83)  RR: 20 (10 Jul 2024 12:00) (15 - 20)  SpO2: 97% (10 Jul 2024 12:00) (90% - 100%)    Parameters below as of 10 Jul 2024 12:00  Patient On (Oxygen Delivery Method): mask, nonrebreather  O2 Flow (L/min): 10      Constitutional - NAD, trach out verbalizes well - sl lethargic on nasal O2  HEENT - NCAT,   Neck - Supple, No limited ROM  Chest -  diminished BS ruel  Cardio - warm and well perfused,   Abdomen -  Soft, NTND. + peg.   Extremities - No peripheral edema, No calf tenderness. RUE fistula.   Neurologic Exam:                    Cognitive -      Cranial Nerves - No facial asymmetry, Tongue midline, Shoulder shrug intact no facial droop     Motor -                      LEFT    UE - ShAB 4/5, EF 4/5, EE 3/5, WE 4/5,  WNL                    RIGHT UE - ShAB 4/5, EF 4/5, EE 3/5, WE 4/5,  WNL                    LEFT    LE - HF 3/5, KE 4/5, DF 4/5, PF 4/5                    RIGHT LE - HF 3/5, KE 4/5, DF 4/5, PF 4/5        Sensory - Intact to LT bilateral in face, arm and legs.      Reflexes - DTR 1 / 4 biceps symmetric. neg Latham's b/l, No clonus.  Psychiatric - Mood stable, Affect WNL  Skin on admission: no sacral pressure injuries.      IDT in [place] 7/3  Tentative DC 7/9 to home-  family training on Friday

## 2024-07-10 NOTE — PROGRESS NOTE ADULT - PROVIDER SPECIALTY LIST ADULT
Hospitalist
Nephrology
Physiatry
Pulmonology
Hospitalist
Internal Medicine
Nephrology
Physiatry
Pulmonology
Hospitalist
Internal Medicine
Physiatry
Pulmonology
Hospitalist
Physiatry
Physiatry
Pulmonology
Hospitalist
Hospitalist
Internal Medicine
Physiatry
Physiatry
Hospitalist
Rehab Medicine

## 2024-07-10 NOTE — CHART NOTE - NSCHARTNOTESELECT_GEN_ALL_CORE
Neuropsychology
Nutrition Services
Nutrition Services
Rapid Response/Event Note
Event Note
Nutrition Services

## 2024-07-10 NOTE — PROGRESS NOTE ADULT - NUTRITIONAL ASSESSMENT
This patient has been assessed with a concern for Malnutrition and has been determined to have a diagnosis/diagnoses of Severe protein-calorie malnutrition and Underweight (BMI < 19).    This patient is being managed with:   Diet Minced and Moist-  Mildly Thick Liquids (MILDTHICKLIQS)  For patients receiving Renal Replacement - No Protein Restr No Conc K No Conc Phos Low Sodium  Tube Feeding Modality: Gastrostomy  Nepro with Carb Steady  Total Volume for 24 Hours (mL): 711  Bolus  Total Volume of Bolus (mL):  237  Total # of Feeds: 3  Tube Feed Frequency: Every 8 hours   Tube Feed Start Time: 08:00  Bolus Feed Rate (mL per Hour): 237   Bolus Feed Duration (in Hours): 0.5  Bolus Feed Instructions:  Give 1 Can Bolus (237ml) of Nepro w/ Carb Steady Only if Consumes Less than 50% of Meals  Free Water Flush Instructions:  Manual Flush 240ml Q8hr  Entered: Jun 24 2024  1:55PM  
This patient has been assessed with a concern for Malnutrition and has been determined to have a diagnosis/diagnoses of Severe protein-calorie malnutrition and Underweight (BMI < 19).    This patient is being managed with:   Diet NPO with Tube Feed-  Tube Feeding Modality: Gastrostomy  Nepro with Carb Steady  Total Volume for 24 Hours (mL): 711  Bolus  Total Volume of Bolus (mL):  237  Total # of Feeds: 3  Tube Feed Frequency: Every 8 hours   Tube Feed Start Time: 08:00  Bolus Feed Rate (mL per Hour): 237   Bolus Feed Duration (in Hours): 0.5  Bolus Feed Instructions:  Give Nepro w/ Carb Steady 1 Can (237ml) via PEG TID @ 8am 12pm & 8pm  Free Water Flush Instructions:  Give Manual Flush 200ml 1hr Before or After Each Bolus Feeding  Entered: Jun 17 2024  2:27PM  
This patient has been assessed with a concern for Malnutrition and has been determined to have a diagnosis/diagnoses of Severe protein-calorie malnutrition and Underweight (BMI < 19).    This patient is being managed with:   Diet NPO with Tube Feed-  Tube Feeding Modality: Gastrostomy  Nepro with Carb Steady  Total Volume for 24 Hours (mL): 990  Continuous  Starting Tube Feed Rate {mL per Hour}: 20  Increase Tube Feed Rate by (mL): 10     Every 6 hours  Until Goal Tube Feed Rate (mL per Hour): 55  Tube Feed Duration (in Hours): 18  Tube Feed Start Time: 12:00  Tube Feed Stop Time: 06:00  Free Water Flush  Pump   Rate (mL per Hour): 25   Frequency: Every Hour    Duration (Hours): 18    Start Time: 12:00    Stop Time: 06:00  Entered: Eze 15 2024 11:12AM  
This patient has been assessed with a concern for Malnutrition and has been determined to have a diagnosis/diagnoses of Severe protein-calorie malnutrition and Underweight (BMI < 19).    This patient is being managed with:   Diet NPO with Tube Feed-  Tube Feeding Modality: Gastrostomy  Nepro with Carb Steady  Total Volume for 24 Hours (mL): 990  Continuous  Starting Tube Feed Rate {mL per Hour}: 20  Increase Tube Feed Rate by (mL): 10     Every 6 hours  Until Goal Tube Feed Rate (mL per Hour): 55  Tube Feed Duration (in Hours): 18  Tube Feed Start Time: 12:00  Tube Feed Stop Time: 06:00  Free Water Flush  Pump   Rate (mL per Hour): 25   Frequency: Every Hour    Duration (Hours): 18    Start Time: 12:00    Stop Time: 06:00  Entered: Eze 15 2024 11:12AM  
This patient has been assessed with a concern for Malnutrition and has been determined to have a diagnosis/diagnoses of Severe protein-calorie malnutrition and Underweight (BMI < 19).    This patient is being managed with:   Diet Pureed-  Mildly Thick Liquids (MILDTHICKLIQS)  For patients receiving Renal Replacement - No Protein Restr No Conc K No Conc Phos Low Sodium  Entered: Jun 20 2024  1:05PM  
This patient has been assessed with a concern for Malnutrition and has been determined to have a diagnosis/diagnoses of Severe protein-calorie malnutrition and Underweight (BMI < 19).    This patient is being managed with:   Diet Pureed-  Mildly Thick Liquids (MILDTHICKLIQS)  For patients receiving Renal Replacement - No Protein Restr No Conc K No Conc Phos Low Sodium  Entered: Jun 20 2024  1:05PM  
This patient has been assessed with a concern for Malnutrition and has been determined to have a diagnosis/diagnoses of Severe protein-calorie malnutrition and Underweight (BMI < 19).    This patient is being managed with:   Diet Soft and Bite Sized-  Mildly Thick Liquids (MILDTHICKLIQS)  For patients receiving Renal Replacement - No Protein Restr No Conc K No Conc Phos Low Sodium  Tube Feeding Modality: Gastrostomy  Nepro with Carb Steady  Total Volume for 24 Hours (mL): 711  Bolus  Total Volume of Bolus (mL):  237  Total # of Feeds: 3  Tube Feed Frequency: Every 8 hours   Tube Feed Start Time: 08:00  Bolus Feed Rate (mL per Hour): 237   Bolus Feed Duration (in Hours): 0.5  Bolus Feed Instructions:  Give 1 Can (237ml) of Nepro w/ Carb Steady Bolus ONLY IF Consumes Less than 50% of Meal  Free Water Flush Instructions:  Manual Flush 240ml Q8hr  Entered: Jul  3 2024  9:43AM  
This patient has been assessed with a concern for Malnutrition and has been determined to have a diagnosis/diagnoses of Severe protein-calorie malnutrition and Underweight (BMI < 19).    This patient is being managed with:   Diet Minced and Moist-  Mildly Thick Liquids (MILDTHICKLIQS)  For patients receiving Renal Replacement - No Protein Restr No Conc K No Conc Phos Low Sodium  Tube Feeding Modality: Gastrostomy  Nepro with Carb Steady  Total Volume for 24 Hours (mL): 711  Bolus  Total Volume of Bolus (mL):  237  Total # of Feeds: 3  Tube Feed Frequency: Every 8 hours   Tube Feed Start Time: 08:00  Bolus Feed Rate (mL per Hour): 237   Bolus Feed Duration (in Hours): 0.5  Bolus Feed Instructions:  Give 1 Can Bolus (237ml) of Nepro w/ Carb Steady Only if Consumes Less than 50% of Meals  Free Water Flush Instructions:  Manual Flush 240ml Q8hr  Entered: Jun 24 2024  1:55PM  
This patient has been assessed with a concern for Malnutrition and has been determined to have a diagnosis/diagnoses of Severe protein-calorie malnutrition and Underweight (BMI < 19).    This patient is being managed with:   Diet NPO with Tube Feed-  Tube Feeding Modality: Gastrostomy  Nepro with Carb Steady  Total Volume for 24 Hours (mL): 711  Bolus  Total Volume of Bolus (mL):  237  Total # of Feeds: 3  Tube Feed Frequency: Every 8 hours   Tube Feed Start Time: 08:00  Bolus Feed Rate (mL per Hour): 237   Bolus Feed Duration (in Hours): 0.5  Bolus Feed Instructions:  Give Nepro w/ Carb Steady 1 Can (237ml) via PEG TID @ 8am 12pm & 8pm  Free Water Flush Instructions:  Give Manual Flush 100cc 1hr Before or After Each Bolus Feeding  Entered: Jun 18 2024  1:50PM  
This patient has been assessed with a concern for Malnutrition and has been determined to have a diagnosis/diagnoses of Severe protein-calorie malnutrition and Underweight (BMI < 19).    This patient is being managed with:   Diet NPO with Tube Feed-  Tube Feeding Modality: Gastrostomy  Nepro with Carb Steady  Total Volume for 24 Hours (mL): 990  Continuous  Starting Tube Feed Rate {mL per Hour}: 20  Increase Tube Feed Rate by (mL): 10     Every 6 hours  Until Goal Tube Feed Rate (mL per Hour): 55  Tube Feed Duration (in Hours): 18  Tube Feed Start Time: 12:00  Tube Feed Stop Time: 06:00  Free Water Flush  Pump   Rate (mL per Hour): 25   Frequency: Every Hour    Duration (Hours): 18    Start Time: 12:00    Stop Time: 06:00  Entered: Eze 15 2024 11:12AM  
This patient has been assessed with a concern for Malnutrition and has been determined to have a diagnosis/diagnoses of Severe protein-calorie malnutrition and Underweight (BMI < 19).    This patient is being managed with:   Diet Pureed-  Mildly Thick Liquids (MILDTHICKLIQS)  For patients receiving Renal Replacement - No Protein Restr No Conc K No Conc Phos Low Sodium  Entered: Jun 20 2024  1:05PM  
This patient has been assessed with a concern for Malnutrition and has been determined to have a diagnosis/diagnoses of Severe protein-calorie malnutrition and Underweight (BMI < 19).    This patient is being managed with:   Diet Pureed-  Mildly Thick Liquids (MILDTHICKLIQS)  For patients receiving Renal Replacement - No Protein Restr No Conc K No Conc Phos Low Sodium  Entered: Jun 20 2024  1:05PM  
This patient has been assessed with a concern for Malnutrition and has been determined to have a diagnosis/diagnoses of Severe protein-calorie malnutrition and Underweight (BMI < 19).    This patient is being managed with:   Diet Easy to Chew-  For patients receiving Renal Replacement - No Protein Restr No Conc K No Conc Phos Low Sodium  Liquid via Teaspoon Only  Supplement Feeding Modality:  Oral  Nepro Cans or Servings Per Day:  1       Frequency:  Two Times a day  Entered: Jul 8 2024 10:45AM  
This patient has been assessed with a concern for Malnutrition and has been determined to have a diagnosis/diagnoses of Severe protein-calorie malnutrition and Underweight (BMI < 19).    This patient is being managed with:   Diet Minced and Moist-  Mildly Thick Liquids (MILDTHICKLIQS)  For patients receiving Renal Replacement - No Protein Restr No Conc K No Conc Phos Low Sodium  Tube Feeding Modality: Gastrostomy  Nepro with Carb Steady  Total Volume for 24 Hours (mL): 711  Bolus  Total Volume of Bolus (mL):  237  Total # of Feeds: 3  Tube Feed Frequency: Every 8 hours   Tube Feed Start Time: 08:00  Bolus Feed Rate (mL per Hour): 237   Bolus Feed Duration (in Hours): 0.5  Bolus Feed Instructions:  Give 1 Can Bolus (237ml) of Nepro w/ Carb Steady Only if Consumes Less than 50% of Meals  Free Water Flush Instructions:  Manual Flush 240ml Q8hr  Entered: Jun 24 2024  1:55PM  
This patient has been assessed with a concern for Malnutrition and has been determined to have a diagnosis/diagnoses of Severe protein-calorie malnutrition and Underweight (BMI < 19).    This patient is being managed with:   Diet NPO with Tube Feed-  Tube Feeding Modality: Gastrostomy  Nepro with Carb Steady  Total Volume for 24 Hours (mL): 711  Bolus  Total Volume of Bolus (mL):  237  Total # of Feeds: 3  Tube Feed Frequency: Every 8 hours   Tube Feed Start Time: 08:00  Bolus Feed Rate (mL per Hour): 237   Bolus Feed Duration (in Hours): 0.5  Bolus Feed Instructions:  Give Nepro w/ Carb Steady 1 Can (237ml) via PEG TID @ 8am 12pm & 8pm  Free Water Flush Instructions:  Give Manual Flush 100cc 1hr Before or After Each Bolus Feeding  Entered: Jun 18 2024  1:50PM  
This patient has been assessed with a concern for Malnutrition and has been determined to have a diagnosis/diagnoses of Severe protein-calorie malnutrition and Underweight (BMI < 19).    This patient is being managed with:   Diet NPO with Tube Feed-  Tube Feeding Modality: Gastrostomy  Nepro with Carb Steady  Total Volume for 24 Hours (mL): 990  Continuous  Starting Tube Feed Rate {mL per Hour}: 20  Increase Tube Feed Rate by (mL): 10     Every 6 hours  Until Goal Tube Feed Rate (mL per Hour): 55  Tube Feed Duration (in Hours): 18  Tube Feed Start Time: 12:00  Tube Feed Stop Time: 06:00  Free Water Flush  Pump   Rate (mL per Hour): 25   Frequency: Every Hour    Duration (Hours): 18    Start Time: 12:00    Stop Time: 06:00  Entered: Eze 15 2024 11:12AM  
This patient has been assessed with a concern for Malnutrition and has been determined to have a diagnosis/diagnoses of Severe protein-calorie malnutrition and Underweight (BMI < 19).    This patient is being managed with:   Diet Pureed-  Mildly Thick Liquids (MILDTHICKLIQS)  For patients receiving Renal Replacement - No Protein Restr No Conc K No Conc Phos Low Sodium  Entered: Jun 20 2024  1:05PM  
This patient has been assessed with a concern for Malnutrition and has been determined to have a diagnosis/diagnoses of Severe protein-calorie malnutrition and Underweight (BMI < 19).    This patient is being managed with:   Diet Soft and Bite Sized-  Mildly Thick Liquids (MILDTHICKLIQS)  For patients receiving Renal Replacement - No Protein Restr No Conc K No Conc Phos Low Sodium  Tube Feeding Modality: Gastrostomy  Nepro with Carb Steady  Total Volume for 24 Hours (mL): 711  Bolus  Total Volume of Bolus (mL):  237  Total # of Feeds: 3  Tube Feed Frequency: Every 8 hours   Tube Feed Start Time: 08:00  Bolus Feed Rate (mL per Hour): 237   Bolus Feed Duration (in Hours): 0.5  Bolus Feed Instructions:  Give 1 Can (237ml) of Nepro w/ Carb Steady Bolus ONLY IF Consumes Less than 50% of Meal  Free Water Flush Instructions:  Manual Flush 240ml Q8hr  Entered: Jul  3 2024  9:43AM  
This patient has been assessed with a concern for Malnutrition and has been determined to have a diagnosis/diagnoses of Severe protein-calorie malnutrition and Underweight (BMI < 19).    This patient is being managed with:   Diet Soft and Bite Sized-  Mildly Thick Liquids (MILDTHICKLIQS)  For patients receiving Renal Replacement - No Protein Restr No Conc K No Conc Phos Low Sodium  Tube Feeding Modality: Gastrostomy  Nepro with Carb Steady  Total Volume for 24 Hours (mL): 711  Bolus  Total Volume of Bolus (mL):  237  Total # of Feeds: 3  Tube Feed Frequency: Every 8 hours   Tube Feed Start Time: 08:00  Bolus Feed Rate (mL per Hour): 237   Bolus Feed Duration (in Hours): 0.5  Bolus Feed Instructions:  Give 1 Can (237ml) of Nepro w/ Carb Steady Bolus ONLY IF Consumes Less than 50% of Meal  Free Water Flush Instructions:  Manual Flush 240ml Q8hr  Entered: Jul  3 2024  9:43AM  
This patient has been assessed with a concern for Malnutrition and has been determined to have a diagnosis/diagnoses of Severe protein-calorie malnutrition and Underweight (BMI < 19).    This patient is being managed with:   Diet Easy to Chew-  For patients receiving Renal Replacement - No Protein Restr No Conc K No Conc Phos Low Sodium  Liquid via Teaspoon Only  Supplement Feeding Modality:  Oral  Nepro Cans or Servings Per Day:  1       Frequency:  Two Times a day  Entered: Jul 8 2024 10:45AM  
This patient has been assessed with a concern for Malnutrition and has been determined to have a diagnosis/diagnoses of Severe protein-calorie malnutrition and Underweight (BMI < 19).    This patient is being managed with:   Diet Easy to Chew-  For patients receiving Renal Replacement - No Protein Restr No Conc K No Conc Phos Low Sodium  Liquid via Teaspoon Only  Supplement Feeding Modality:  Oral  Nepro Cans or Servings Per Day:  1       Frequency:  Two Times a day  Entered: Jul 8 2024 10:45AM  
This patient has been assessed with a concern for Malnutrition and has been determined to have a diagnosis/diagnoses of Severe protein-calorie malnutrition and Underweight (BMI < 19).    This patient is being managed with:   Diet Minced and Moist-  Mildly Thick Liquids (MILDTHICKLIQS)  For patients receiving Renal Replacement - No Protein Restr No Conc K No Conc Phos Low Sodium  Tube Feeding Modality: Gastrostomy  Nepro with Carb Steady  Total Volume for 24 Hours (mL): 711  Bolus  Total Volume of Bolus (mL):  237  Total # of Feeds: 3  Tube Feed Frequency: Every 8 hours   Tube Feed Start Time: 08:00  Bolus Feed Rate (mL per Hour): 237   Bolus Feed Duration (in Hours): 0.5  Bolus Feed Instructions:  Give 1 Can Bolus (237ml) of Nepro w/ Carb Steady Only if Consumes Less than 50% of Meals  Free Water Flush Instructions:  Manual Flush 240ml Q8hr  Entered: Jun 24 2024  1:55PM  
This patient has been assessed with a concern for Malnutrition and has been determined to have a diagnosis/diagnoses of Severe protein-calorie malnutrition and Underweight (BMI < 19).    This patient is being managed with:   Diet NPO with Tube Feed-  Tube Feeding Modality: Gastrostomy  Nepro with Carb Steady  Total Volume for 24 Hours (mL): 711  Bolus  Total Volume of Bolus (mL):  237  Total # of Feeds: 3  Tube Feed Frequency: Every 8 hours   Tube Feed Start Time: 08:00  Bolus Feed Rate (mL per Hour): 237   Bolus Feed Duration (in Hours): 0.5  Bolus Feed Instructions:  Give Nepro w/ Carb Steady 1 Can (237ml) via PEG TID @ 8am 12pm & 8pm  Free Water Flush Instructions:  Give Manual Flush 200ml 1hr Before or After Each Bolus Feeding  Entered: Jun 17 2024  2:27PM  
This patient has been assessed with a concern for Malnutrition and has been determined to have a diagnosis/diagnoses of Severe protein-calorie malnutrition and Underweight (BMI < 19).    This patient is being managed with:   Diet Pureed-  Mildly Thick Liquids (MILDTHICKLIQS)  For patients receiving Renal Replacement - No Protein Restr No Conc K No Conc Phos Low Sodium  Entered: Jun 20 2024  1:05PM  
This patient has been assessed with a concern for Malnutrition and has been determined to have a diagnosis/diagnoses of Severe protein-calorie malnutrition and Underweight (BMI < 19).    This patient is being managed with:   Diet Soft and Bite Sized-  Mildly Thick Liquids (MILDTHICKLIQS)  For patients receiving Renal Replacement - No Protein Restr No Conc K No Conc Phos Low Sodium  Tube Feeding Modality: Gastrostomy  Nepro with Carb Steady  Total Volume for 24 Hours (mL): 711  Bolus  Total Volume of Bolus (mL):  237  Total # of Feeds: 3  Tube Feed Frequency: Every 8 hours   Tube Feed Start Time: 08:00  Bolus Feed Rate (mL per Hour): 237   Bolus Feed Duration (in Hours): 0.5  Bolus Feed Instructions:  Give 1 Can (237ml) of Nepro w/ Carb Steady Bolus ONLY IF Consumes Less than 50% of Meal  Free Water Flush Instructions:  Manual Flush 240ml Q8hr  Entered: Jul  3 2024  9:43AM  
This patient has been assessed with a concern for Malnutrition and has been determined to have a diagnosis/diagnoses of Severe protein-calorie malnutrition and Underweight (BMI < 19).    This patient is being managed with:   Diet Minced and Moist-  Mildly Thick Liquids (MILDTHICKLIQS)  For patients receiving Renal Replacement - No Protein Restr No Conc K No Conc Phos Low Sodium  Tube Feeding Modality: Gastrostomy  Nepro with Carb Steady  Total Volume for 24 Hours (mL): 711  Bolus  Total Volume of Bolus (mL):  237  Total # of Feeds: 3  Tube Feed Frequency: Every 8 hours   Tube Feed Start Time: 08:00  Bolus Feed Rate (mL per Hour): 237   Bolus Feed Duration (in Hours): 0.5  Bolus Feed Instructions:  Give 1 Can Bolus (237ml) of Nepro w/ Carb Steady Only if Consumes Less than 50% of Meals  Free Water Flush Instructions:  Manual Flush 240ml Q8hr  Entered: Jun 24 2024  1:55PM  
This patient has been assessed with a concern for Malnutrition and has been determined to have a diagnosis/diagnoses of Severe protein-calorie malnutrition and Underweight (BMI < 19).    This patient is being managed with:   Diet Minced and Moist-  Mildly Thick Liquids (MILDTHICKLIQS)  For patients receiving Renal Replacement - No Protein Restr No Conc K No Conc Phos Low Sodium  Tube Feeding Modality: Gastrostomy  Nepro with Carb Steady  Total Volume for 24 Hours (mL): 711  Bolus  Total Volume of Bolus (mL):  237  Total # of Feeds: 3  Tube Feed Frequency: Every 8 hours   Tube Feed Start Time: 08:00  Bolus Feed Rate (mL per Hour): 237   Bolus Feed Duration (in Hours): 0.5  Bolus Feed Instructions:  Give 1 Can Bolus (237ml) of Nepro w/ Carb Steady Only if Consumes Less than 50% of Meals  Free Water Flush Instructions:  Manual Flush 240ml Q8hr  Entered: Jun 24 2024  1:55PM    This patient has been assessed with a concern for Malnutrition and has been determined to have a diagnosis/diagnoses of Severe protein-calorie malnutrition and Underweight (BMI < 19).    This patient is being managed with:   Diet Minced and Moist-  Mildly Thick Liquids (MILDTHICKLIQS)  For patients receiving Renal Replacement - No Protein Restr No Conc K No Conc Phos Low Sodium  Tube Feeding Modality: Gastrostomy  Nepro with Carb Steady  Total Volume for 24 Hours (mL): 711  Bolus  Total Volume of Bolus (mL):  237  Total # of Feeds: 3  Tube Feed Frequency: Every 8 hours   Tube Feed Start Time: 08:00  Bolus Feed Rate (mL per Hour): 237   Bolus Feed Duration (in Hours): 0.5  Bolus Feed Instructions:  Give 1 Can Bolus (237ml) of Nepro w/ Carb Steady Only if Consumes Less than 50% of Meals  Free Water Flush Instructions:  Manual Flush 240ml Q8hr  Entered: Jun 24 2024  1:55PM  
This patient has been assessed with a concern for Malnutrition and has been determined to have a diagnosis/diagnoses of Severe protein-calorie malnutrition and Underweight (BMI < 19).    This patient is being managed with:   Diet Pureed-  Mildly Thick Liquids (MILDTHICKLIQS)  For patients receiving Renal Replacement - No Protein Restr No Conc K No Conc Phos Low Sodium  Entered: Jun 20 2024  1:05PM  
This patient has been assessed with a concern for Malnutrition and has been determined to have a diagnosis/diagnoses of Severe protein-calorie malnutrition and Underweight (BMI < 19).    This patient is being managed with:   Diet Soft and Bite Sized-  Mildly Thick Liquids (MILDTHICKLIQS)  For patients receiving Renal Replacement - No Protein Restr No Conc K No Conc Phos Low Sodium  Tube Feeding Modality: Gastrostomy  Nepro with Carb Steady  Total Volume for 24 Hours (mL): 711  Bolus  Total Volume of Bolus (mL):  237  Total # of Feeds: 3  Tube Feed Frequency: Every 8 hours   Tube Feed Start Time: 08:00  Bolus Feed Rate (mL per Hour): 237   Bolus Feed Duration (in Hours): 0.5  Bolus Feed Instructions:  Give 1 Can (237ml) of Nepro w/ Carb Steady Bolus ONLY IF Consumes Less than 50% of Meal  Free Water Flush Instructions:  Manual Flush 240ml Q8hr  Entered: Jul  3 2024  9:43AM  
This patient has been assessed with a concern for Malnutrition and has been determined to have a diagnosis/diagnoses of Severe protein-calorie malnutrition and Underweight (BMI < 19).    This patient is being managed with:   Diet Minced and Moist-  Mildly Thick Liquids (MILDTHICKLIQS)  For patients receiving Renal Replacement - No Protein Restr No Conc K No Conc Phos Low Sodium  Tube Feeding Modality: Gastrostomy  Nepro with Carb Steady  Total Volume for 24 Hours (mL): 711  Bolus  Total Volume of Bolus (mL):  237  Total # of Feeds: 3  Tube Feed Frequency: Every 8 hours   Tube Feed Start Time: 08:00  Bolus Feed Rate (mL per Hour): 237   Bolus Feed Duration (in Hours): 0.5  Bolus Feed Instructions:  Give 1 Can Bolus (237ml) of Nepro w/ Carb Steady Only if Consumes Less than 50% of Meals  Free Water Flush Instructions:  Manual Flush 240ml Q8hr  Entered: Jun 24 2024  1:55PM

## 2024-07-10 NOTE — PROGRESS NOTE ADULT - ASSESSMENT
73-year-old male w/ past medical history hypertension, ESRD (HD MWF), insulin-dependent DM presented 4/29/24 to [facility] with hiccups, chest pain admitted for concern demand ischemia. Hospital course complicated by AMS s/p intubation. AMS secondary to baclofen toxicity vs. R pontine and cerebellar stroke. He is s/p trach (5/14) and PEG (5/17) placement. Hospital course further complicated by Bacillus cereus bacteremia s/p abx, nonocclusive R common iliac DVT and upper GI Bleed. His dialysis fistula site was stenotic, s/p fistulogram, ultimately changed to a RUE AVF 6/5. He is admitted to [facility] on 6/14/24.  Rehab course complicated by fever/hypoxia found to have COVID     #Recent AMS Suspect Due to Baclofen Toxicity vs CVA  #Acute CVA  - MRI head 5/6 showed acute infarct in left talya and left cerebellum   - continue holding baclofen  - continue ASA and statin  - continue Modafinil  - fall precaution  - Pain control and bowel regimen  - Continue comprehensive rehab program with PT/OT/SLP    #Acute Hypoxemic, Hypercapnic Respiratory Failure, s/p ETT intubation/Tracheostomy now Decannulated   #Aspiration PNA?  - s/p course of zosyn  - scopolamine patch  - s/p tracheostomy   - Albuterol neb q6h   - pulm following  - oral suction  - trach decannulated on 7/5  - COVID and RVP negative    #Acute Hypoxic Respiratory Failure 2/2 COVID  -O2Sat 80-85% 7/9 on RA, improved on 3L NC  -COVID PCR 7/10 secondary to exposure   -CXR 7/9 personally reviewed, ?left base effusion, pending official read   -Pulmonary following:     #ESRD on HD MWF via RT AFF  #Anemia  - stable, continue HD  - monitor H/H    #R Common Iliac DVT  - CT AP (5/12) with nonocclusive RIGHT common iliac vein deep venous thrombosis  - Continue Eliquis 5 mg BID     #Hallucination  - Seroquel 12.5mg HS  - Psych following    #HTN  - Coreg 25 q12h  - Hydralazine 100 mg TID  - Norvasc 10 mg/D  - BP acceptable     #DM2   - A1c 6.9  - Lantus 5 qAM, ISS    #Oropharyngeal Dysphagia  - s/p PEG (5/17)  - Continue PO diet and supplement with TF if po intake is poor    DVT PPX: Eliquis   73-year-old male w/ past medical history hypertension, ESRD (HD MWF), insulin-dependent DM presented 4/29/24 to [facility] with hiccups, chest pain admitted for concern demand ischemia. Hospital course complicated by AMS s/p intubation. AMS secondary to baclofen toxicity vs. R pontine and cerebellar stroke. He is s/p trach (5/14) and PEG (5/17) placement. Hospital course further complicated by Bacillus cereus bacteremia s/p abx, nonocclusive R common iliac DVT and upper GI Bleed. His dialysis fistula site was stenotic, s/p fistulogram, ultimately changed to a RUE AVF 6/5. He is admitted to [facility] on 6/14/24.  He is followed by pulmonary, s/p decannulation on 7/5. Rehab course complicated by fever/hypoxia found to have COVID     #Recent AMS Suspect Due to Baclofen Toxicity vs CVA  #Acute CVA  - MRI head 5/6 showed acute infarct in left talya and left cerebellum   - continue holding baclofen  - continue ASA and statin  - continue Modafinil  - fall precaution  - Pain control and bowel regimen  - Continue comprehensive rehab program with PT/OT/SLP    #Acute Hypoxemic, Hypercapnic Respiratory Failure, s/p ETT intubation/Tracheostomy now Decannulated (7/5)  #Aspiration PNA?  - s/p course of zosyn  - Albuterol inhal q6h   - pulm following  - oral suction  - Local wound care to stoma     #Sepsis and Acute Hypoxic Respiratory Failure 2/2 COVID  -Met criteria for sepsis with fever and tachycardia (7/9)  -O2Sat 80-85% 7/9 on RA, improved on 3L NC  -COVID PCR positive 7/9   -CXR 7/9 personally reviewed, ?left base effusion, pending official read   -Pulmonary following: no remdesivir due to ESRD, decadron 6mg IVP QD x 10 days then taper  -Albuterol inh Q6H  -Supportive care   -Continue supplementation O2 for O2Sat >92%  -Contact and airborne isolation per protocol     #ESRD on HD MWF via RT AFF  #Anemia  - stable, continue HD  - continue retacrit   - monitor H/H    #R Common Iliac DVT  - CT AP (5/12) with nonocclusive RIGHT common iliac vein deep venous thrombosis  - Continue Eliquis 5 mg BID     #Hallucination  - Seroquel 12.5mg HS  - Psych following    #HTN  #Recent NSTEMI/Demand Ischemia  -Echo 5/4 showed EF 63%, no regional wall motion abnormalities seen.  -Continue aspirin  -Conitnue coreg, amlodipine, hydralazine   -Monitor BP  -Continue atorvastatin     #Recent GIB  -Concern melena 5/27, resolved, transfused   -GI consulted, not candidate for endoscopy due to surgical risk  -Concern for upper GI Bleed, GI felt not true bleed  -Continue PPI BID   -H/H stable    #Type 2 Diabetes  - HbA1c 6.9 4/30  - Continue Lantus 5units QAM  - RAISS  - Monitor FS    #Oropharyngeal Dysphagia  - s/p PEG (5/17)  - MBS 7/8, advanced to easy to chew  - Supplement with G tube as needed  - Aspiration precaution     DVT PPX: Eliquis

## 2024-07-10 NOTE — CHART NOTE - NSCHARTNOTEFT_GEN_A_CORE
History of Present Illness  Bariatric Behavioral Health Evaluation [facility]:   He is here today because: Increase health, increase mobility and prevent family health issues  He is seeking a Bariatric surgery evaluation  He researched this option for: 6 months  He realizes the post-op requirements Yes, patient has researched procedure  His Psychiatric/Psychological diagnosis: His outpatient counselor is   His Psychiatrist is:      He had Inpatient Treament   (IN 2014 patient diagnosed with depression and anxiety as a result of being fired from his job  )  Family Constellation: Family Constellation: Mother: 76years old  Father: 68years old  Siblings: 2 sisters living and 2 brothers   Spouse:   Children: 1 son who is 24years old  He lives withhis parents   Domestic Violence: has not happened  Abuse History: (Workforce Bullied )   Physical/psychological assessment Appearance: appropriate   Sociability: average  Affect: appropriate  Mood: calm  Thought Process: coherent  Speech: normal  Content: no impairment  The patient was oriented to person, oriented to place, oriented to time and normal memory   normal attention span  no decreased concentration ability  normal judgment  His emotional insight was: good  His intellectual insight was: good  Risk assessment: Symptoms:  anxiety and depressed mood, but no suicidal ideation, no suicide plan, no suicide attempt, no homicidal thoughts, no hopelessness, no helplessness, no feelings of despair, no loss of interests, no anhedonia and no sense of isolation  The patient is currently asymptomatic  Associated symptoms:  no aggressive behavior, no high risk behavior, no racing thoughts, no periods of excess energy, no periods of euphoria, no delusions, no command hallucinations, no auditory hallucinations and no visual hallucinations  No associated symptoms are reported  The patient is not currently being treated for this problem   Pertinent medical Rapid reponse called for hypoxia.  O2Sat high 70's on 3L which improved to 92% on NRB.    Patient seen and examined at bedside, he is awake and alert.   Vital signs: Tempt: 98.3, HR 70, /70, O2sat 92% on NRB.    PHYSICAL EXAM:  GENERAL: NAD  HEENT: NCAT, trach stoma without secretion  CHEST/LUNG: Diminished at left base, otherwise clear, no wheeze  HEART: Regular rate and rhythm; No murmurs  ABDOMEN: Soft, Nontender, Nondistended; Bowel sounds present, PEG in place, site c/d/i  MUSCULOSKELETAL/EXTREMITIES:  2+ Peripheral Pulses, No LE edema  PSYCH: Appropriate affect  NEURO: Awake, alert, responsive, follows commands    AB.39///18 on NRB    A&P  73-year-old male w/ past medical history hypertension, ESRD (HD MWF), insulin-dependent DM presented 24 to [facility] with hiccups, chest pain admitted for concern demand ischemia. Hospital course complicated by AMS s/p intubation. AMS secondary to baclofen toxicity vs. R pontine and cerebellar stroke. He is s/p trach () and PEG () placement. Hospital course further complicated by Bacillus cereus bacteremia s/p abx, nonocclusive R common iliac DVT and upper GI Bleed. His dialysis fistula site was stenotic, s/p fistulogram, ultimately changed to a RUE AVF . He is admitted to [facility] on 24.  He is followed by pulmonary, s/p decannulation on . Rehab course complicated by fever/hypoxia found to have COVID     Patient with sepsis and acute hypoxic respiratory failure secondary to COVID  Follow up labs and repeat CXR  ABG reviewed  Discussed with ICU team, will be transferred to ICU for high flow oxygen  Pulmonary following: no remdesivir due to ESRD, decadron 6mg IVP QD x 10 days then taper  Albuterol inh Q6H  Supportive care   Contact and airborne isolation per protocol history:  depression and anxiety disorder, but no seasonal affective disorder, no premenstrual dysphoric disorder, no postpartum depression, no bipolar disorder, no post traumatic stress disorder, no eating disorder, no personality disorder, no schizophrenia, no chronic pain, no chronic headaches, no dementia, no malignancy and no HIV infection  Risk factors:  bereavement, financial stress and emotional abuse, but no relationship problems, no job loss, no social isolation, no access to lethal means, no alcohol abuse, no substance abuse, no physical abuse, no sexual abuse, no bullying, no previous suicide attempt, no recent psychiatric hospitalization, no recent family suicide and no recent friend suicide  No risk factors have been identified  Family history:  depression, but no suicide, no bipolar disorder and no substance abuse  He was previously evaluated by me  Sexual history: He is not pregnant  He does not have a history of   He does not have a history of miscarriage  He does not have a history of hypersexuality  He does not have a history of STD's  The Following Ratings are based on my: Obsevation of this individual over the last  Risk of Harm to Self or Others: The following are demographic risk factors associated with harm to self: , [place], or   The following are demographic risk factors associated with harm to others: male and unemployed  (None reported )  Recent Specific Risk Factors: The patient is currently asymptomatic  No associated symptoms are reported  Summary and Recommendations:   Low: No thoughts or occasional thoughts of suicide, but no intent or actions  Self inflicted scratches, abrasions, or other self- injurious of behavior where medical attention is typically not warranted  No elements of homicidality or occasional thoughts but no plan, intent, or actions  Active Problems    1  Adrenal adenoma (227 0) (D35 00)   2   Bilateral carpal tunnel Rapid reponse called for hypoxia.  O2Sat high 70's on 3L which improved to 92% on NRB.    Patient seen and examined at bedside, he is awake and alert.   Vital signs: Tempt: 98.3, HR 70, /70, O2sat 92% on NRB.    PHYSICAL EXAM:  GENERAL: NAD  HEENT: NCAT, trach stoma without secretion  CHEST/LUNG: Diminished at left base, otherwise clear, no wheeze  HEART: Regular rate and rhythm; No murmurs  ABDOMEN: Soft, Nontender, Nondistended; Bowel sounds present, PEG in place, site c/d/i  MUSCULOSKELETAL/EXTREMITIES:  2+ Peripheral Pulses, No LE edema  PSYCH: Appropriate affect  NEURO: Awake, alert, responsive, follows commands    AB.39///18 on NRB    A&P  73-year-old male w/ past medical history hypertension, ESRD (HD MWF), insulin-dependent DM presented 24 to [facility] with hiccups, chest pain admitted for concern demand ischemia. Hospital course complicated by AMS s/p intubation. AMS secondary to baclofen toxicity vs. R pontine and cerebellar stroke. He is s/p trach () and PEG () placement. Hospital course further complicated by Bacillus cereus bacteremia s/p abx, nonocclusive R common iliac DVT and upper GI Bleed. His dialysis fistula site was stenotic, s/p fistulogram, ultimately changed to a RUE AVF . He is admitted to [facility] on 24.  He is followed by pulmonary, s/p decannulation on . Rehab course complicated by fever/hypoxia found to have COVID     Patient with sepsis and acute hypoxic respiratory failure secondary to COVID  Follow up labs and repeat CXR  ABG reviewed  Discussed with ICU team, will be transferred to ICU for high flow oxygen  Pulmonary following: no remdesivir due to ESRD, decadron 6mg IVP QD x 10 days then taper  Albuterol inh Q6H  Supportive care   Contact and airborne isolation per protocol    Updated patient's wife over the phone, she is in agreement, all questions were answered syndrome (354 0) (G56 03)   3  Carpal tunnel syndrome of right wrist (354 0) (G56 01)   4  Chronic right-sided thoracic back pain (724 1,338 29) (M54 6,G89 29)   5  CMC arthritis, thumb, degenerative (715 34) (M18 9)   6  Cubital tunnel syndrome (354 2) (G56 20)   7  Cubital tunnel syndrome of both upper extremities (354 2) (G56 23)   8  Lumbago (724 2) (M54 5)   9  Morbid obesity (278 01) (E66 01)   10  Myofascial pain syndrome (729 1) (M79 1)   11  Postoperative examination (V67 00) (Z09)   12  Postoperative wound dehiscence, initial encounter (998 32) (T81 31XA)   13  Postoperative wound dehiscence, subsequent encounter (V58 89,998 32) (T81 31XD)   14  Sleep apnea (780 57) (G47 30)   15  Thoracic compression fracture (805 2) (S22 000A)   16  Tinea capitis (110 0) (B35 0)   17  Ulnar neuropathy at elbow of left upper extremity (354 2) (G56 22)   18  Umbilical hernia (414 6) (K42 9)   19  Vitamin D deficiency (268 9) (E55 9)    Past Medical History    1  History of Anxiety (300 00) (F41 9)   2  History of depression (V11 8) (Z86 59)   3  History of Skin lesion of scalp (709 9) (L98 9)   4  History of Status post hernia repair (V45 89) (Z98 890,Z87 19)    Surgical History    1  History of Excision Of Lesion Scalp   2  History of Hemorrhoidectomy   3  History of Hernia Repair   4  History of Knee Surgery   5  History of Neuroplasty Decompression Median Nerve At Carpal Tunnel   6  History of Umbilical Hernia Repair    Family History  Mother    1  Family history of malignant neoplasm (V16 9) (Z80 9)  Father    2  Family history of arthritis (V17 7) (Z82 61)   3  Family history of osteoporosis (V17 81) (Z82 62)  Maternal Grandmother    4  Family history of malignant neoplasm (V16 9) (Z80 9)  Maternal Grandfather    5  Family history of malignant neoplasm (V16 9) (Z80 9)  Family History    6  Family history of Arthritis (716 90) (M19 90)   7  Family history of Back problem   8   Family history of Cancer (199 1) (C80 1)    Social History    · Alcohol use (V49 89) (Z78 9)   · Caffeine use (V49 89) (F15 90)   · Never a smoker   · No illicit drug use   · Social drinker    Current Meds   1  Escitalopram Oxalate 20 MG Oral Tablet; Therapy: (Recorded:37Kpg9084) to Recorded   2  TiZANidine HCl - 4 MG Oral Tablet; TAKE 1 TABLET AT BEDTIME; Therapy: 04BYP6511 to (Evaluate:14Apr2017)  Requested for: 28CRP2116; Last   Rx:30Acb7084 Ordered   3  Vitamin D (Ergocalciferol) 88003 UNIT Oral Capsule; TAKE 1 CAPSULE WEEKLY; Therapy: 86ZGI6510 to (Evaluate:28Jun2017)  Requested for: 57KHG5568; Last   Rx:89Oej9499 Ordered   4  Wellbutrin  MG Oral Tablet Extended Release 12 Hour (BuPROPion HCl ER   (SR)); Take 1 tablet daily Recorded   5  Xylocaine Jelly 2 % GEL (Lidocaine HCl); Apply prn to wound(s) prior to debridement for   pain control; Therapy: (IEPUQUYM:86TJA4128) to Recorded   6  Zolpidem Tartrate 10 MG Oral Tablet; Therapy: 49NOE8509 to Recorded    Allergies    1  Penicillins    Vitals  Signs   Recorded: 36TZI2051 10:18AM   Height: 5 ft 8 5 in  Weight: 304 lb 5 oz  BMI Calculated: 45 6  BSA Calculated: 2 46     Note   Note:   Completed Behavioral Health Assessment  Provided patient, education as needed  Patient admits to Axis I diagnosis of depression and anxiety and is currently taking medication prescribed by his psychiatrist  Patient will work on the following goals; increase physical activity and complete psychiatric evaluation  Recommendations is deferred until psychiatric evaluation is received and reviewed  Future Appointments    Date/Time Provider Specialty Site   02/22/2017 01:10 PM HAMIDA Hurt  Orthopedic Surgery [facility] [address]   01/03/2018 07:45 AM Ju Cai Endocrinology [facility] ENDOCRINOLOGY [facility]   02/22/2017 10:30 AM HAMIDA Funez   General Surgery [facility]   02/28/2017 02:00 PM Zeke Christine Rapid reponse called for hypoxia.  O2Sat high 70's on 3L which improved to 92% on NRB.    Patient seen and examined at bedside, he is awake and alert.   Vital signs: Tempt: 98.3, HR 70, /70, O2sat 92% on NRB.    PHYSICAL EXAM:  GENERAL: NAD  HEENT: NCAT, trach stoma without secretion  CHEST/LUNG: Diminished at left base, otherwise clear, no wheeze  HEART: Regular rate and rhythm; No murmurs  ABDOMEN: Soft, Nontender, Nondistended; Bowel sounds present, PEG in place, site c/d/i  MUSCULOSKELETAL/EXTREMITIES:  2+ Peripheral Pulses, No LE edema  PSYCH: Appropriate affect  NEURO: Awake, alert, responsive, follows commands    AB.39///18 on NRB    A&P  73-year-old male w/ past medical history hypertension, ESRD (HD MWF), insulin-dependent DM presented 24 to [facility] with hiccups, chest pain admitted for concern demand ischemia. Hospital course complicated by AMS s/p intubation. AMS secondary to baclofen toxicity vs. R pontine and cerebellar stroke. He is s/p trach () and PEG () placement. Hospital course further complicated by Bacillus cereus bacteremia s/p abx, nonocclusive R common iliac DVT and upper GI Bleed. His dialysis fistula site was stenotic, s/p fistulogram, ultimately changed to a RUE AVF . He is admitted to [facility] on 24.  He is followed by pulmonary, s/p decannulation on . Rehab course complicated by fever/hypoxia found to have COVID     Patient with sepsis and acute hypoxic respiratory failure secondary to COVID  Follow up labs and repeat CXR  ABG reviewed  Administration of sepsis fluid limited due to ESRD   Discussed with ICU team, will be transferred to ICU for high flow oxygen  Pulmonary following: no remdesivir due to ESRD, decadron 6mg IVP QD x 10 days then taper  Albuterol inh Q6H  Supportive care   Contact and airborne isolation per protocol    Updated patient's wife over the phone, she is in agreement, all questions were answered HAMIDA Zamarripahal Apgar   2017 10:40 AM HAMIDA Maldonado  Cardiology  [address] [facility]     Signatures   Electronically signed by : BRIDGET KillianSW; Feb 15 2017 11:09AM EST                       (Author)    Electronically signed by :  HAMIDA Corcoran ; Mar  7 2017 10:41AM EST

## 2024-07-10 NOTE — PROGRESS NOTE ADULT - REASON FOR ADMISSION
CVA, Encephalopathy
Rehab
CVA
CVA, Encephalopathy
Rehab
acute respiratory failure, intubation, sepsis, CRRT, DVT, GIB, trach and PEG placement, He is admitted for acute multidisciplinary rehab
rehab
rehab
CVA, Encephalopathy
Rehab
toxic encephalopathy, CVA
CVA
CVA, Encephalopathy
CVA, GIB, DVT, Trach/PEG
Rehab
toxic encephalopathy, CVA
CVA
CVA, Encephalopathy
s/p CVA
CVA, encephalopathy

## 2024-07-10 NOTE — PROGRESS NOTE ADULT - ASSESSMENT
73-year-old male w/ past medical history hypertension, ESRD (HD MWF)  insulin-dependent DM presented 4/29/24 to [facility] 4/29 with hiccups and demand ischemia, was treated with baclofen, developed what appears to be toxic encephalopathy. He then sustained a prolonged, complex hospital course over approx 6 weeks, notable for acute respiratory failure, intubation, sepsis, CRRT, DVT, GIB, trach and PEG placement, He is admitted for acute multidisciplinary rehab on 6/14/24 to [facility].     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  #Hospital Acquired Debility   #Dysphagia  #Gait, ADL, Functional impairments  - Comprehensive Multidisciplinary Rehab, PT/OT/ SLP 3 hr/day 5d/wk  - AC: Eliquis 5 BID for DVT, seen on CTA/P 5/12  - ASA81 qD  - Pain: PRN Tylenol, Lidocaine patch   - Absolute Avoidance of Baclofen   DC cancelled secondary to COVID+  For DC to ICU secondary to Decline in resp status  called patient's wife Pauline and notified of transfer    #Acute Hypoxemic, Hypercapnic Respiratory Failure, s/p ETT intubation  #s/p tracheostomy   - Albuterol neb q6h   - Continue TC w/ ATC  Low grade temps , COVID +- on nasal O2  Started on steroids by hospitalist  Dc to medicine      #ESRD on HD  #Fistula stenosis   #Anemia   - Continue HD M/W/F per renal   - Access is RUE AVF  - Regular monitoring of electrolytes  - EPOGEN w/ HD  - Iron supplementation, 300 mg daily   Continue HD    #HTN  - Coreg 25 q12h  - Hydralazine 100 mg TID  - Norvasc 10 mg/D  - Isordil would be next agent added  BPs Stable    #NSTEMI, Demand Ischemia, resolved  - Initially, troponins mildly elevated  - TTE (4/30): EF 72, normal, no regional wall motion abnormalities   - No need to pursue cath at this time   - 20 mg lipitor at bedtime     #IDDM2, A1c 6.9  AM lantus 5 units  - FS, ISS TIDAC      #R Common Iliac DVT  - Initially treated with heparin but then developed questionable GIB, transitioned to Eliquis   - IR was consulted, no plan for IVC filter  - Continue Eliquis 5 mg BID     #GI  - Concern melena 5/27, resolved, transfused   - GI consulted, not candidate for endoscopy due to surgical risk  - Concern for upper GI Bleed, GI felt not true bleed  - Continue PPI BID   - MIralax 17g daily  - Senna 2 @ bedtime   - Ducolax suppository daily PRN   last BM 7/8    #Oropharyngeal Dysphagia  #s/p PEG (5/17)  - Failed green dye 6/1   - Continue feeds: Card Steady 10 cc/hr incr. q6 goal 45cc/hr - feeds changed to Bolus  MBS 6/20- cleared for minced moist with mild thicks  supplement PO with G feeds if needed  speech advanced diet to soft with mild thicks  MBS this AM- cleared for Reg with thins via teaspoon  may need to change diet due to compromised resp status      #Mood / Cognition:  - Neuropsych evaluation given prolonged and complex hospitalization  - Chart mentions concern for depression w/ possible suicidal ideation   good sleep with seroquel-will Dc on Discharge  provigil in AM only- will Dc on discharge    #/Bladder:  - Check urinary status on arrival, possibly anuric   spont voiding    #Dysphagia:    - Diet: tube feeds/ soft with mild thicks  SP MBS- reg with thins- no need to DC with PEG feeds to supplement  - Ongoing SLP assessment- speech advanced diet   - Nutrition to follow  OOB For ALL MEALS- may need to downgrade diet given compromised resp status      #Skin/ Pressure Injury Prevention:  - Assessment on admission   - Incisions:    #Precautions/ Restrictions  - Falls, Aspiration Precautions   - COVID PCR:     40 minutes spent on patient's care and discharge today

## 2024-07-10 NOTE — PROGRESS NOTE ADULT - SUBJECTIVE AND OBJECTIVE BOX
Interval History  Patient seen and examined at bedside. No acute events noted. Afebrile this morning.  Patient denies any acute complaints, no cough/SOB/chest pain. Rest of ROS is negative.  Saturating well on 3L NC.    ALLERGIES:  No Known Allergies    MEDICATIONS  (STANDING):  albuterol    90 MICROgram(s) HFA Inhaler 2 Puff(s) Inhalation every 6 hours  amLODIPine   Tablet 10 milliGRAM(s) Oral daily  apixaban 5 milliGRAM(s) Enteral Tube two times a day  aspirin  chewable 81 milliGRAM(s) Oral daily  atorvastatin 20 milliGRAM(s) Oral at bedtime  benzonatate 100 milliGRAM(s) Oral <User Schedule>  carvedilol 25 milliGRAM(s) Oral every 12 hours  chlorhexidine 2% Cloths 1 Application(s) Topical <User Schedule>  dexAMETHasone     Tablet 6 milliGRAM(s) Oral daily  dextrose 10% Bolus 125 milliLiter(s) IV Bolus once  dextrose 5%. 1000 milliLiter(s) (50 mL/Hr) IV Continuous <Continuous>  dextrose 5%. 1000 milliLiter(s) (100 mL/Hr) IV Continuous <Continuous>  dextrose 50% Injectable 12.5 Gram(s) IV Push once  dextrose 50% Injectable 25 Gram(s) IV Push once  epoetin reilly-epbx (RETACRIT) Injectable 43878 Unit(s) IV Push <User Schedule>  ferrous    sulfate Liquid 300 milliGRAM(s) Enteral Tube daily  glucagon  Injectable 1 milliGRAM(s) IntraMuscular once  hydrALAZINE 100 milliGRAM(s) Oral three times a day  insulin glargine Injectable (LANTUS) 5 Unit(s) SubCutaneous every morning  insulin lispro (ADMELOG) corrective regimen sliding scale   SubCutaneous three times a day before meals  lidocaine   4% Patch 1 Patch Transdermal every 24 hours  lidocaine   4% Patch 1 Patch Transdermal every 24 hours  methyl salicylate 15%/menthol 10% Topical Cream 1 Application(s) Topical <User Schedule>  Nephro-noam 1 Tablet(s) Oral daily  pantoprazole   Suspension 40 milliGRAM(s) Enteral Tube two times a day  polyethylene glycol 3350 17 Gram(s) Oral daily  QUEtiapine 12.5 milliGRAM(s) Oral at bedtime  senna 2 Tablet(s) Oral at bedtime    MEDICATIONS  (PRN):  acetaminophen   Oral Liquid .. 650 milliGRAM(s) Oral every 6 hours PRN Moderate Pain (4 - 6)  bisacodyl Suppository 10 milliGRAM(s) Rectal daily PRN Constipation  dextrose Oral Gel 15 Gram(s) Oral once PRN Blood Glucose LESS THAN 70 milliGRAM(s)/deciliter  dextrose Oral Gel 15 Gram(s) Oral once PRN Blood Glucose LESS THAN 70 milliGRAM(s)/deciliter  melatonin 3 milliGRAM(s) Oral at bedtime PRN Insomnia    Vital Signs Last 24 Hrs  T(F): 97.7 (10 Jul 2024 08:30), Max: 100.9 (09 Jul 2024 17:17)  HR: 63 (10 Jul 2024 08:30) (63 - 103)  BP: 131/63 (10 Jul 2024 08:30) (131/63 - 180/70)  RR: 16 (10 Jul 2024 08:30) (15 - 16)  SpO2: 92% (10 Jul 2024 08:30) (90% - 100%)  I&O's Summary    09 Jul 2024 07:01  -  10 Jul 2024 07:00  --------------------------------------------------------  IN: 0 mL / OUT: 1500 mL / NET: -1500 mL    PHYSICAL EXAM:  GENERAL: NAD, not in any respiratory distress   HEENT: NCAT, trach stoma without secretion  CHEST/LUNG: Diminished at left base, otherwise clear, no wheeze  HEART: Regular rate and rhythm; No murmurs  ABDOMEN: Soft, Nontender, Nondistended; Bowel sounds present, PEG in place, site c/d/i  MUSCULOSKELETAL/EXTREMITIES:  2+ Peripheral Pulses, No LE edema  PSYCH: Appropriate affect  NEURO: Alert & Oriented    I personally reviewed the below data/images/labs:    LABS:                        9.0    9.33  )-----------( 330      ( 09 Jul 2024 10:00 )             28.4       07-09    137  |  98  |  44  ----------------------------<  240  3.8   |  30  |  8.33    Ca    9.1      09 Jul 2024 10:00  Phos  5.7     07-09    05-17 Chol 86 mg/dL LDL -- HDL 27 mg/dL Trig 151 mg/dL    POCT Blood Glucose.: 205 mg/dL (10 Jul 2024 08:05)  POCT Blood Glucose.: 147 mg/dL (09 Jul 2024 21:11)  POCT Blood Glucose.: 177 mg/dL (09 Jul 2024 16:50)  POCT Blood Glucose.: 110 mg/dL (09 Jul 2024 13:32)    Urinalysis Basic - ( 09 Jul 2024 10:00 )    Color: x / Appearance: x / SG: x / pH: x  Gluc: 240 mg/dL / Ketone: x  / Bili: x / Urobili: x   Blood: x / Protein: x / Nitrite: x   Leuk Esterase: x / RBC: x / WBC x   Sq Epi: x / Non Sq Epi: x / Bacteria: x    COVID-19 PCR: Detected (07-09-24 @ 13:43)  COVID-19 PCR: NotDetec (07-07-24 @ 04:30)  COVID-19 PCR: NotDetec (06-14-24 @ 18:56)    Consultant(s) Notes Reviewed:   Care Discussed with Consultants/Other Providers:  Imaging Personally Reviewed:

## 2024-07-17 ENCOUNTER — TRANSCRIPTION ENCOUNTER (OUTPATIENT)
Age: 74
End: 2024-07-17

## 2024-07-21 ENCOUNTER — NON-APPOINTMENT (OUTPATIENT)
Age: 74
End: 2024-07-21

## 2024-07-22 NOTE — PROGRESS NOTE ADULT - SUBJECTIVE AND OBJECTIVE BOX
RN discussed with patient - see e-advice encounter.   [facility]   MD ELIANE BHAGAT MD ANGELA WONG, PA Venitha Krishnan, NP    TEL:  OFFICE: [phone]  From 5pm-7am Answering Service [phone]    -- RENAL FOLLOW UP NOTE ---Date of Service 05-15-24 @ 16:49    Patient is a 73y old  Male who presents with a chief complaint of Unstable angina, abn EKG (15 May 2024 11:06)      Patient seen and examined in ICU. in no apparent distress intubated     VITALS:  T(F): 96.5 (05-15-24 @ 14:08), Max: 100.1 (05-15-24 @ 04:00)  HR: 70 (05-15-24 @ 15:15)  BP: 138/78 (05-15-24 @ 14:08)  RR: 12 (05-15-24 @ 14:08)  SpO2: 100% (05-15-24 @ 15:15)  Wt(kg): --    05-14 @ 07:01  -  05-15 @ 07:00  --------------------------------------------------------  IN: 809.8 mL / OUT: 0 mL / NET: 809.8 mL    05-15 @ 07:01  -  05-15 @ 16:49  --------------------------------------------------------  IN: 611.2 mL / OUT: 1400 mL / NET: -788.8 mL          PHYSICAL EXAM:  General: NAD  Neck: No JVD  Respiratory: CTAB, no wheezes, rales or rhonchi  Cardiovascular: S1, S2, RRR  Gastrointestinal: BS+, soft, NT/ND  Extremities: No peripheral edema    Hospital Medications:   MEDICATIONS  (STANDING):  aspirin  chewable 81 milliGRAM(s) Oral daily  chlorhexidine 0.12% Liquid 15 milliLiter(s) Oral Mucosa every 12 hours  chlorhexidine 2% Cloths 1 Application(s) Topical <User Schedule>  dextrose 10% Bolus 125 milliLiter(s) IV Bolus once  dextrose 5%. 1000 milliLiter(s) (50 mL/Hr) IV Continuous <Continuous>  dextrose 5%. 1000 milliLiter(s) (100 mL/Hr) IV Continuous <Continuous>  dextrose 50% Injectable 12.5 Gram(s) IV Push once  dextrose 50% Injectable 25 Gram(s) IV Push once  epoetin reilly (EPOGEN) Injectable 37507 Unit(s) IV Push <User Schedule>  glucagon  Injectable 1 milliGRAM(s) IntraMuscular once  insulin lispro (ADMELOG) corrective regimen sliding scale   SubCutaneous every 6 hours  norepinephrine Infusion 0.05 MICROgram(s)/kG/Min (4.93 mL/Hr) IV Continuous <Continuous>  petrolatum Ophthalmic Ointment 1 Application(s) Both EYES two times a day  propofol Infusion 19.987 MICROgram(s)/kG/Min (6.31 mL/Hr) IV Continuous <Continuous>  sevelamer carbonate Powder 800 milliGRAM(s) Oral every 8 hours  sodium bicarbonate 650 milliGRAM(s) Oral every 8 hours  sodium chloride 3%  Inhalation 4 milliLiter(s) Inhalation every 12 hours  vancomycin  IVPB 500 milliGRAM(s) IV Intermittent once      LABS:  05-15    137  |  95<L>  |  93<H>  ----------------------------<  193<H>  3.9   |  19<L>  |  6.91<H>    Ca    7.9<L>      15 May 2024 01:15  Phos  6.3     05-15  Mg     2.50     05-15    TPro  5.7<L>  /  Alb  2.3<L>  /  TBili  0.2  /  DBili      /  AST  15  /  ALT  8   /  AlkPhos  103  05-15    Creatinine Trend: 6.91 <--, 5.83 <--, 8.00 <--, 7.97 <--, 6.68 <--, 4.24 <--, 4.23 <--, 3.99 <--, 5.81 <--    Albumin: 2.3 g/dL (05-15 @ 01:15)  Phosphorus: 6.3 mg/dL (05-15 @ 01:15)                              7.2    9.67  )-----------( 305      ( 15 May 2024 01:15 )             20.5     Urine Studies:  Urinalysis - [05-15-24 @ 01:15]      Color  / Appearance  / SG  / pH       Gluc 193 / Ketone   / Bili  / Urobili        Blood  / Protein  / Leuk Est  / Nitrite       RBC  / WBC  / Hyaline  / Gran  / Sq Epi  / Non Sq Epi  / Bacteria       Iron 47, TIBC --, %sat --      [05-01-24 @ 06:44]  TSH 2.79      [04-30-24 @ 06:03]    HBsAb <3.0      [05-01-24 @ 14:42]  HBcAb Nonreact      [05-01-24 @ 14:42]      RADIOLOGY & ADDITIONAL STUDIES:

## 2024-07-24 ENCOUNTER — TRANSCRIPTION ENCOUNTER (OUTPATIENT)
Age: 74
End: 2024-07-24

## 2024-07-26 ENCOUNTER — NON-APPOINTMENT (OUTPATIENT)
Age: 74
End: 2024-07-26

## 2024-07-26 RX ORDER — INSULIN GLARGINE 100 [IU]/ML
100 INJECTION, SOLUTION SUBCUTANEOUS AT BEDTIME
Refills: 0 | Status: ACTIVE | COMMUNITY

## 2024-07-26 RX ORDER — ASPIRIN 81 MG
81 TABLET, DELAYED RELEASE (ENTERIC COATED) ORAL DAILY
Refills: 0 | Status: ACTIVE | COMMUNITY

## 2024-07-26 RX ORDER — MIDODRINE HYDROCHLORIDE 5 MG/1
5 TABLET ORAL
Refills: 0 | Status: ACTIVE | COMMUNITY

## 2024-07-26 RX ORDER — ALBUTEROL 90 MCG
90 AEROSOL (GRAM) INHALATION
Refills: 0 | Status: ACTIVE | COMMUNITY

## 2024-07-26 RX ORDER — PANTOPRAZOLE SODIUM 40 MG/1
40 GRANULE, DELAYED RELEASE ORAL
Refills: 0 | Status: ACTIVE | COMMUNITY

## 2024-07-26 RX ORDER — APIXABAN 2.5 MG/1
2.5 TABLET, FILM COATED ORAL
Qty: 180 | Refills: 1 | Status: ACTIVE | COMMUNITY

## 2024-07-26 RX ORDER — FERROUS SULFATE 300 MG/5ML
300 LIQUID (ML) ORAL DAILY
Refills: 0 | Status: ACTIVE | COMMUNITY

## 2024-07-29 NOTE — PROGRESS NOTE ADULT - SUBJECTIVE AND OBJECTIVE BOX
Follow-up Pulmonary Progress Note  Chief Complaint : Cerebral infarction      patient seen and examined  tolerating cap  no secretions  no sob  patient decannulated this am   Alyven dressing appled        Allergies :No Known Allergies      PAST MEDICAL & SURGICAL HISTORY:  Diabetes    Benign essential HTN    HLD (hyperlipidemia)    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    ESRD on hemodialysis    Arteriovenous fistula        Medications:  MEDICATIONS  (STANDING):  albuterol    0.083% 2.5 milliGRAM(s) Nebulizer every 6 hours  amLODIPine   Tablet 10 milliGRAM(s) Oral daily  apixaban 5 milliGRAM(s) Enteral Tube two times a day  aspirin  chewable 81 milliGRAM(s) Oral daily  atorvastatin 20 milliGRAM(s) Oral at bedtime  benzonatate 100 milliGRAM(s) Oral <User Schedule>  carvedilol 25 milliGRAM(s) Oral every 12 hours  chlorhexidine 2% Cloths 1 Application(s) Topical <User Schedule>  dextrose 10% Bolus 125 milliLiter(s) IV Bolus once  dextrose 5%. 1000 milliLiter(s) (50 mL/Hr) IV Continuous <Continuous>  dextrose 5%. 1000 milliLiter(s) (100 mL/Hr) IV Continuous <Continuous>  dextrose 50% Injectable 12.5 Gram(s) IV Push once  dextrose 50% Injectable 25 Gram(s) IV Push once  epoetin reilly-epbx (RETACRIT) Injectable 62909 Unit(s) IV Push <User Schedule>  ferrous    sulfate Liquid 300 milliGRAM(s) Enteral Tube daily  glucagon  Injectable 1 milliGRAM(s) IntraMuscular once  hydrALAZINE 100 milliGRAM(s) Oral three times a day  insulin glargine Injectable (LANTUS) 5 Unit(s) SubCutaneous every morning  insulin lispro (ADMELOG) corrective regimen sliding scale   SubCutaneous three times a day before meals  lidocaine   4% Patch 1 Patch Transdermal every 24 hours  lidocaine   4% Patch 1 Patch Transdermal every 24 hours  methyl salicylate 15%/menthol 10% Topical Cream 1 Application(s) Topical <User Schedule>  modafinil 50 milliGRAM(s) Oral <User Schedule>  Nephro-noam 1 Tablet(s) Oral daily  pantoprazole   Suspension 40 milliGRAM(s) Enteral Tube two times a day  polyethylene glycol 3350 17 Gram(s) Oral daily  QUEtiapine 12.5 milliGRAM(s) Oral at bedtime  senna 2 Tablet(s) Oral at bedtime    MEDICATIONS  (PRN):  acetaminophen   Oral Liquid .. 650 milliGRAM(s) Oral every 6 hours PRN Moderate Pain (4 - 6)  bisacodyl Suppository 10 milliGRAM(s) Rectal daily PRN Constipation  dextrose Oral Gel 15 Gram(s) Oral once PRN Blood Glucose LESS THAN 70 milliGRAM(s)/deciliter  dextrose Oral Gel 15 Gram(s) Oral once PRN Blood Glucose LESS THAN 70 milliGRAM(s)/deciliter  melatonin 3 milliGRAM(s) Oral at bedtime PRN Insomnia      Antibiotics History  piperacillin/tazobactam IVPB.. 3.375 Gram(s) IV Intermittent every 12 hours, 06-29-24 @ 22:03, Stop order after: 7 Days         CULTURES: (if applicable)    Culture - Sputum (collected 06-30-24 @ 09:00)  Source: .Sputum Sputum  Gram Stain (06-30-24 @ 23:39):    Numerous polymorphonuclear leukocytes per low power field    Few Squamous epithelial cells per low power field    Few Gram Negative Rods per oil power field    Few Gram positive cocci in pairs per oil power field  Final Report (07-02-24 @ 14:20):    Normal Respiratory Jocelyn present    Culture - Blood (collected 06-30-24 @ 00:00)  Source: .Blood Blood-Peripheral  Final Report (07-05-24 @ 11:00):    No growth at 5 days    Culture - Blood (collected 06-30-24 @ 00:00)  Source: .Blood Blood-Peripheral  Final Report (07-05-24 @ 11:00):    No growth at 5 days      Rapid RVP Result: NotDetec (06-29-24 @ 22:30)       < from: Xray Chest 1 View- PORTABLE-Routine (Xray Chest 1 View- PORTABLE-Routine in AM.) (07.03.24 @ 09:30) >  ACC: [id number] EXAM:  XR CHEST PORTABLE ROUTINE 1V   ORDERED BY: JESUS CULP     PROCEDURE DATE:  07/03/2024          INTERPRETATION:  HISTORY: Admitting Dxs: I63.9 CEREBRAL INFARCTION,   UNSPECIFIED; ; eval pna;  TECHNIQUE: Portable frontal view of the chest, 1 view.  COMPARISON: none.  FINDINGS/  IMPRESSION:  Tracheostomy tube present. Stent overlying the right axilla.   HEART: normal in size.  LUNGS: Left lower lobe infiltrate, similar to prior..  BONES: degenerative changes    --- End of Report ---    < end of copied text >    VITALS:  T(C): 36.8 (07-05-24 @ 07:42), Max: 36.8 (07-04-24 @ 21:38)  T(F): 98.3 (07-05-24 @ 07:42), Max: 98.3 (07-04-24 @ 21:38)  HR: 58 (07-05-24 @ 15:01) (55 - 62)  BP: 125/64 (07-05-24 @ 07:42) (125/64 - 150/74)  BP(mean): --  ABP: --  ABP(mean): --  RR: 16 (07-05-24 @ 07:42) (14 - 16)  SpO2: 96% (07-05-24 @ 15:01) (96% - 100%)  CVP(mm Hg): --  CVP(cm H2O): --    Ins and Outs  Iris is an 8-month-old female with no significant past medical history here for fever.  Most likely cause of symptoms is due to a viral upper respiratory infection.  Due to fevers for less than 24 hours it is not clinically indicated at this time to obtain a urinalysis.  Due to the patient having no cough, no wheezing, and no increased work of breathing there is low clinical suspicion for bronchiolitis.  It is unlikely that any sort of bacteria could have grown in the half ounce of formula left out on the counter and spread enough to cause fever in that short of a time period. Will give motrin for fever control.

## 2024-08-01 ENCOUNTER — APPOINTMENT (OUTPATIENT)
Dept: INTERNAL MEDICINE | Facility: CLINIC | Age: 74
End: 2024-08-01

## 2024-08-06 ENCOUNTER — TRANSCRIPTION ENCOUNTER (OUTPATIENT)
Age: 74
End: 2024-08-06

## 2024-08-06 ENCOUNTER — APPOINTMENT (OUTPATIENT)
Dept: GASTROENTEROLOGY | Facility: CLINIC | Age: 74
End: 2024-08-06

## 2024-08-06 PROBLEM — K92.2 UGI BLEED: Status: ACTIVE | Noted: 2024-08-06

## 2024-08-06 PROBLEM — K92.1 MELENA: Status: ACTIVE | Noted: 2024-08-06

## 2024-08-06 PROCEDURE — 99214 OFFICE O/P EST MOD 30 MIN: CPT

## 2024-08-06 NOTE — ASSESSMENT
[FreeTextEntry1] : Probably an upper endoscopy would be too risky given his medical condition, so will schedule him for a Capsule Endoscopy. Continue pantoprazole Stay off Eliquis for awhile  I spent 37 minutes with the patient and his wife and answered all of their questions

## 2024-08-06 NOTE — HISTORY OF PRESENT ILLNESS
[FreeTextEntry1] : He recently  was discharged from [facility] where he was admitted for feeling weak and was found to have a low hemoglobin. He was transfused 2 Units of PRBCs and discharged on pantoprazole.  He had been on Eliquis which was stopped. He has a history of dark stool which was hemoccult positive. He had a colonoscopy last year which revealed diverticular disease.. He is being fed with a PEG tube after COVID but he can swallow and wants to eat, according to his wife. He is on dialysis  Monday, Wednesday and Friday.  He is presently on pantoprazole 40 mg daily   His wife is in the room

## 2024-08-06 NOTE — CONSULT LETTER
[Dear  ___] : Dear  [unfilled], [Consult Letter:] : I had the pleasure of evaluating your patient, [unfilled]. [( Thank you for referring [unfilled] for consultation for _____ )] : Thank you for referring [unfilled] for consultation for [unfilled] [Please see my note below.] : Please see my note below. [Consult Closing:] : Thank you very much for allowing me to participate in the care of this patient.  If you have any questions, please do not hesitate to contact me. [Sincerely,] : Sincerely, [FreeTextEntry3] : Reji Sommer MD  Gastroenterology [facility]

## 2024-08-06 NOTE — PHYSICAL EXAM
[Normal] : normal bowel sounds, non-tender, no masses, soft, no no hepato-splenomegaly [de-identified] : He is in a wheel chair

## 2024-08-13 ENCOUNTER — APPOINTMENT (OUTPATIENT)
Dept: INTERNAL MEDICINE | Facility: CLINIC | Age: 74
End: 2024-08-13
Payer: MEDICARE

## 2024-08-13 VITALS
DIASTOLIC BLOOD PRESSURE: 75 MMHG | OXYGEN SATURATION: 98 % | HEIGHT: 68 IN | HEART RATE: 85 BPM | BODY MASS INDEX: 17.88 KG/M2 | SYSTOLIC BLOOD PRESSURE: 164 MMHG | WEIGHT: 118 LBS

## 2024-08-13 DIAGNOSIS — Z09 ENCOUNTER FOR FOLLOW-UP EXAMINATION AFTER COMPLETED TREATMENT FOR CONDITIONS OTHER THAN MALIGNANT NEOPLASM: ICD-10-CM

## 2024-08-13 DIAGNOSIS — E78.5 HYPERLIPIDEMIA, UNSPECIFIED: ICD-10-CM

## 2024-08-13 DIAGNOSIS — D64.9 ANEMIA, UNSPECIFIED: ICD-10-CM

## 2024-08-13 DIAGNOSIS — Z93.1 GASTROSTOMY STATUS: ICD-10-CM

## 2024-08-13 DIAGNOSIS — R53.83 OTHER FATIGUE: ICD-10-CM

## 2024-08-13 DIAGNOSIS — E11.65 TYPE 2 DIABETES MELLITUS WITH HYPERGLYCEMIA: ICD-10-CM

## 2024-08-13 DIAGNOSIS — I10 ESSENTIAL (PRIMARY) HYPERTENSION: ICD-10-CM

## 2024-08-13 DIAGNOSIS — D50.8 OTHER IRON DEFICIENCY ANEMIAS: ICD-10-CM

## 2024-08-13 PROCEDURE — 99495 TRANSJ CARE MGMT MOD F2F 14D: CPT

## 2024-08-13 PROCEDURE — 36415 COLL VENOUS BLD VENIPUNCTURE: CPT

## 2024-08-13 RX ORDER — NUT.TX.IMP.RENAL FXN,LAC-REDUC 0.08 G-1.8
LIQUID (ML) ORAL
Qty: 1 | Refills: 11 | Status: ACTIVE | COMMUNITY
Start: 2024-08-13 | End: 1900-01-01

## 2024-08-13 RX ORDER — HYDRALAZINE HYDROCHLORIDE 50 MG/1
50 TABLET ORAL 3 TIMES DAILY
Qty: 270 | Refills: 1 | Status: ACTIVE | COMMUNITY
Start: 2024-08-13 | End: 1900-01-01

## 2024-08-13 NOTE — HISTORY OF PRESENT ILLNESS
[Post-hospitalization from ___ Hospital] : Post-hospitalization from [unfilled] Hospital [Admitted on: ___] : The patient was admitted on [unfilled] [Discharged on ___] : discharged on [unfilled] [FreeTextEntry2] : Mr. JMIMY BLAND 73 year male with a PMH uncontrolled DM2, hyperlipidemia, vit D deficiency, CAD , NSTEM,  DVT, s/p CVA, ESRD on HD 3 times per week(M-W-F) came for a f/u after hospitalization. Patient  was admitted to the [facility] on 08/01/2024  due to a n anemia. As per patient  after HD on 07/31/24 patient was  feeling cold and not feeling like himself. Wife took him to the ED where they found him to have  anemia Hb was 6.8,  patient received  2 packs of RBCs. Stay in the hospital till 08/04/24. D.c home with Hb 9. Last week  was seen by a GI on 08/06/24, schedule him to have a capsule endoscopy on 09/03/24 Patient also  has  another  hospital f/u from 04/29/24 As per wife on 04/29/24 she took patient to [facility] due to a hiccups  x 2 days and admitted for NSTEMI vs demand ischemia concern to medicine. Baclofen started and course complicated by AMS second to baclofen toxicity requiring intubation and MICU transfer. Course further complicated by LEFT pontine and cerebellar stroke, R AVF stenosis requiring fistuloplasty, aspiration PNA, and B Cereus bacteremia and RLE DVT. s/p trach and transferred to RCU 5/16 and course complicated by intermittent fever spikes with likely aspiration Citrobacter PNA, AOCD, and GIB vs Fe stools. s/p fistulogram 6/4 and able to wean off vent to TC ATC.  Patient also had PEG tube feeding. Tracheostomy was closed , patient is able to breath on his own. Patient is currently on meds midodrine 5mg  3 times per week before HD Yady-noam 0.8mg  qd Lipitor 20mgqgs nifedipine 90mg qd Pantoprazole 40mg qd hydralazine 50mg TID Lantus 10U SQ qhs Novolog TID Needs refill on hydralazine

## 2024-08-13 NOTE — PLAN
[FreeTextEntry1] : Mr. JIMMY BLAND 73 year male with a PMH uncontrolled DM2, hyperlipidemia, vit D deficiency, CAD , NSTEM,  DVT, s/p CVA, ESRD on HD 3 times per week(M-W-F) came for a f/u after hospitalization. Hospital f/u was done Recommend to do a blood test today further management will depend on the blood test results. HTN recommend to continue hydralazine, nifedipine. Recommend  low salt diet, decrease caffeine intake. Monitor BP reading at home NSTEMI continue Lipitor, hydralazine, nifedipine. F/u with cardiologist DM2 recommend to bring a log book. Recommend to continue insulin injection novolog, lantus Close monitor BG F/u with endo, podiatrist, ophthalmologist GERD continue protonix S/p CVA continue  lipitor, hydralazine, nifedipine, f/u with neurologist ESRD on HD, continue present meds, renal diet. F/u with nephrologist Anemia - repeat cbc, Had a colonoscopy on 06/20/2023 - IH, diverticulosis, recommend to repeat colonoscopy in 10 years. F/u with GI(do capsule endoscopy on 09/03/24)

## 2024-08-13 NOTE — HISTORY OF PRESENT ILLNESS
[Post-hospitalization from ___ Hospital] : Post-hospitalization from [unfilled] Hospital [Admitted on: ___] : The patient was admitted on [unfilled] [Discharged on ___] : discharged on [unfilled] [FreeTextEntry2] : Mr. JIMMY BLAND 73 year male with a PMH uncontrolled DM2, hyperlipidemia, vit D deficiency, CAD , NSTEM,  DVT, s/p CVA, ESRD on HD 3 times per week(M-W-F) came for a f/u after hospitalization. Patient  was admitted to the [facility] on 08/01/2024  due to a n anemia. As per patient  after HD on 07/31/24 patient was  feeling cold and not feeling like himself. Wife took him to the ED where they found him to have  anemia Hb was 6.8,  patient received  2 packs of RBCs. Stay in the hospital till 08/04/24. D.c home with Hb 9. Last week  was seen by a GI on 08/06/24, schedule him to have a capsule endoscopy on 09/03/24 Patient also  has  another  hospital f/u from 04/29/24 As per wife on 04/29/24 she took patient to [facility] due to a hiccups  x 2 days and admitted for NSTEMI vs demand ischemia concern to medicine. Baclofen started and course complicated by AMS second to baclofen toxicity requiring intubation and MICU transfer. Course further complicated by LEFT pontine and cerebellar stroke, R AVF stenosis requiring fistuloplasty, aspiration PNA, and B Cereus bacteremia and RLE DVT. s/p trach and transferred to RCU 5/16 and course complicated by intermittent fever spikes with likely aspiration Citrobacter PNA, AOCD, and GIB vs Fe stools. s/p fistulogram 6/4 and able to wean off vent to TC ATC.  Patient also had PEG tube feeding. Tracheostomy was closed , patient is able to breath on his own. Patient is currently on meds midodrine 5mg  3 times per week before HD Yady-noam 0.8mg  qd Lipitor 20mgqgs nifedipine 90mg qd Pantoprazole 40mg qd hydralazine 50mg TID Lantus 10U SQ qhs Novolog TID Needs refill on hydralazine

## 2024-08-13 NOTE — PHYSICAL EXAM
[TextEntry] :  Constitutional: Well appearing, not in acute distress, sitting on a wheelchair Head: Normocephalic, no lesions Ear: Ear canal is normal, tympanic membrane is intact Nose: Nasal turbinates are NL Throat: Clear, no exudates, no lesions Neck: Supple, no masses, has a well healed scar(close tracheostomy) Chest: Lungs are clear, no rales, no rhonchi, no wheezing Heart: Regular rate, no murmurs. R arm has AV fistula Abdomen: Soft, no tenderness, has a PEG tube on the L side of the abdominal wall.  : No CVAT Neuro: AAO x 2, Has L sided hemiparesis

## 2024-08-20 DIAGNOSIS — I63.9 CEREBRAL INFARCTION, UNSPECIFIED: ICD-10-CM

## 2024-09-03 ENCOUNTER — APPOINTMENT (OUTPATIENT)
Dept: GASTROENTEROLOGY | Facility: CLINIC | Age: 74
End: 2024-09-03
Payer: MEDICARE

## 2024-09-03 ENCOUNTER — NON-APPOINTMENT (OUTPATIENT)
Age: 74
End: 2024-09-03

## 2024-09-03 DIAGNOSIS — D72.829 ELEVATED WHITE BLOOD CELL COUNT, UNSPECIFIED: ICD-10-CM

## 2024-09-03 DIAGNOSIS — D58.2 OTHER HEMOGLOBINOPATHIES: ICD-10-CM

## 2024-09-03 DIAGNOSIS — D64.9 ANEMIA, UNSPECIFIED: ICD-10-CM

## 2024-09-03 DIAGNOSIS — K92.1 MELENA: ICD-10-CM

## 2024-09-03 PROCEDURE — 91110 GI TRC IMG INTRAL ESOPH-ILE: CPT

## 2024-09-04 PROBLEM — D58.2 SIGNIFICANT DROP IN HEMOGLOBIN: Status: ACTIVE | Noted: 2024-09-04

## 2024-09-04 NOTE — HISTORY OF PRESENT ILLNESS
[FreeTextEntry1] : He is here for a Capsule Endoscopy for a drop in his hemoglobin to R/O any small bowel pathology .

## 2024-09-05 DIAGNOSIS — R13.14 DYSPHAGIA, PHARYNGOESOPHAGEAL PHASE: ICD-10-CM

## 2024-09-18 DIAGNOSIS — K21.9 GASTRO-ESOPHAGEAL REFLUX DISEASE W/OUT ESOPHAGITIS: ICD-10-CM

## 2024-09-18 RX ORDER — BLOOD-GLUCOSE METER
W/DEVICE EACH MISCELLANEOUS
Qty: 1 | Refills: 0 | Status: ACTIVE | COMMUNITY
Start: 2024-09-18 | End: 1900-01-01

## 2024-09-18 RX ORDER — LANCETS
EACH MISCELLANEOUS
Qty: 1 | Refills: 1 | Status: ACTIVE | COMMUNITY
Start: 2024-09-18 | End: 1900-01-01

## 2024-09-19 ENCOUNTER — APPOINTMENT (OUTPATIENT)
Dept: HOME HEALTH SERVICES | Facility: HOME HEALTH | Age: 74
End: 2024-09-19

## 2024-09-19 VITALS
SYSTOLIC BLOOD PRESSURE: 146 MMHG | HEART RATE: 68 BPM | DIASTOLIC BLOOD PRESSURE: 67 MMHG | TEMPERATURE: 98.4 F | OXYGEN SATURATION: 98 % | RESPIRATION RATE: 18 BRPM

## 2024-09-19 DIAGNOSIS — Z86.79 PERSONAL HISTORY OF OTHER DISEASES OF THE CIRCULATORY SYSTEM: ICD-10-CM

## 2024-09-19 PROCEDURE — G0506: CPT

## 2024-09-19 PROCEDURE — 99345 HOME/RES VST NEW HIGH MDM 75: CPT | Mod: 25

## 2024-09-19 PROCEDURE — 99497 ADVNCD CARE PLAN 30 MIN: CPT

## 2024-09-19 RX ORDER — PANTOPRAZOLE 40 MG/1
40 TABLET, DELAYED RELEASE ORAL DAILY
Qty: 90 | Refills: 1 | Status: ACTIVE | COMMUNITY
Start: 2024-09-18

## 2024-09-19 NOTE — HISTORY OF PRESENT ILLNESS
[FreeTextEntry1] : CVA [FreeTextEntry2] : JIMMY BLAND is a 73 year M presenting as a new patient to establish care. Accompanied by wife who provided all information.    Follows with: PCP - Dr Prachi Omer Nephrology Cardiology Ophtho GI Vascular Endrocrinology Neurology Prn Pulmonology   Active medical problems: ESRD on HD (MWF) DM2 on insulin CVA - R leg weakness, L hand weakness, Getting PT and OT.  COPD Hx tracheostomy s/p closure HTN  HLD GERD Hx bradycardia PVD DVT with Hx GIB Dysphagia with PEG tube Functional Quadriplegia   Recent hospitalizations: As per chart   Past surgical hx: Tracheostomy s/p closure R forearm fistula with repair PEG tube   Medications: Lantus 10u nightly  Humalog - sliding scale range of 4-11 depending on sugar Nifedipine 90mg daily Hydralazine 50mg BID Pantoprazole 40mg dialy  Renavite Atorvastatin 10mg dialy Midodrine 5mg before dialysis on dialysis days.  Albuterol HFA Primary Pharmacy: [facility]   Allergies: NKDA   Family history: as per chart   Immunizations: Does not want flu shot yet   Vision: good Hearing: good Gait/Falls/Assisted devices: bed/wheelchair bound, able to assist with transferring. no falls Skin: intact Pain: R knee sometimes, especially therapy.  Appetite: PEG tube, nothing by mouth. 4x daily Nepro - 4 x 237ml daily. 10-30 to flush after each feed.  BM: regular Urine: diapers L, 2x daily.  Sleep: good Mood: improved   Social: Lives with wife, son, daughter with 3 grandkids. 5 children total.  HHA: none ADL: dependent IADLs: able to use phone by himself. Otherwise dependent.  DME: Standard wheelchair, commode, bath chair, 2 wheeled walker, Pending grab bar in bathroom.  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

## 2024-09-23 RX ORDER — BLOOD SUGAR DIAGNOSTIC
STRIP MISCELLANEOUS TWICE DAILY
Qty: 1 | Refills: 1 | Status: ACTIVE | COMMUNITY
Start: 2024-09-18 | End: 1900-01-01

## 2024-10-01 ENCOUNTER — NON-APPOINTMENT (OUTPATIENT)
Age: 74
End: 2024-10-01

## 2024-10-01 ENCOUNTER — APPOINTMENT (OUTPATIENT)
Dept: GASTROENTEROLOGY | Facility: CLINIC | Age: 74
End: 2024-10-01

## 2024-10-02 ENCOUNTER — NON-APPOINTMENT (OUTPATIENT)
Age: 74
End: 2024-10-02

## 2024-10-02 ENCOUNTER — OUTPATIENT (OUTPATIENT)
Dept: OUTPATIENT SERVICES | Facility: HOSPITAL | Age: 74
LOS: 1 days | End: 2024-10-02
Payer: MEDICARE

## 2024-10-02 DIAGNOSIS — I77.0 ARTERIOVENOUS FISTULA, ACQUIRED: Chronic | ICD-10-CM

## 2024-10-02 DIAGNOSIS — R13.14 DYSPHAGIA, PHARYNGOESOPHAGEAL PHASE: ICD-10-CM

## 2024-10-02 PROCEDURE — 74230 X-RAY XM SWLNG FUNCJ C+: CPT | Mod: 26

## 2024-10-02 PROCEDURE — 74230 X-RAY XM SWLNG FUNCJ C+: CPT

## 2024-10-03 NOTE — REASON FOR VISIT
[Videofluroscopy] : Videofluroscopy [FreeTextEntry1] : MODIFIED BARIUM SWALLOW STUDY   Date of Report: 10/2/24   Date of Evaluation:  10/2/24   Patient Name:  Megha Art  YOB: 1950  Date of Onset:  Ongoing   Primary Diagnosis: Dysphagia   Treatment Diagnosis: Dysphagia   Referring Physician:  Dr. Reji Chavez     IMPRESSIONS/RESULTS:    1. Pharyngeal clearance deficits.    2. One instance of trace laryngeal penetration above the level of the vocal cords without retrieval before and during the swallow with thin liquids.       RECOMMENDATIONS:   1. Pending clearance and under the advisement of GI (patient's referring MD), would recommend puree diet with mildly thick liquids as tolerated    2. Liquids via single and small cup sips only    3. Encourage second dry swallow; alternate liquids/solids   4. Oral care    5. STRICT ASPIRATION PRECAUTIONS   6. Nutrition consult at MD discretion given reported weight loss    7. Dysphagia tx; Dysphagia tx to include trials of soft and bite sized and regular solids as well as thin liquids via single and small cup sips with SLP ONLY       HISTORY:    This 73-year-old male was seen this morning for a Modified Barium Swallow Study to r/o aspiration, assess for diet texture change as indicated, and to explore positional strategies and/or compensatory strategies to eliminate penetration/aspiration. The physician ordered this procedure as they want the pt to meet their nutrition/hydration needs by mouth without compromising respiratory status.       Patient arrived to [facility] Radiology suite with his wife. The patient's wife reported the following: "He had two strokes in April of 2024, he is a kidney patient and gets dialysis three times per week, he has diabetes, and hypertension." The patient's wife stated the following: "The strokes impacted his swallowing and breathing, he had a trach which came out in July of 2024 and he has a feeding tube since April of 2024." The patient's wife reported that the patient has not consumed anything by mouth since receiving a MBS in April of 2024 and that he receives his nutrition/hydration solely through the PEG. She stated that he has not received ST since he was in acute rehabilitation in July of 2024. Also, she endorsed that the patient has had difficulty swallowing his saliva though stated his secretion management has improved. She stated that he occasionally "spits out" his saliva instead of swallowing it but that he is swallowing it more than he was. She reported that prior to incurring "two strokes" the patient had no difficulty chewing or swallowing and that he maintained a regular diet with thin liquids. She endorsed no pt hx of PNA or food allergies. She reported that the patient has had approximately a 50-pound weight loss since April of 2024.     Of note, per chart review, swallow bedside assessment note on 7/17/24 at [facility] stated the following: "Known to this service and was followed closely on acute rehab. Multiple MBSS with most recent completed on 7/8 with recommendations for easy to chew textures with thin liquids via teaspoon and bolus hold.Given known recent hx of dysphagia, multiple MBSS revealing aspiration, recent treatment for potential aspiration pneumonia, potential exacerbation of dysphagia from disuse atrophy vs deconditioning from illness patient would benefit from repeat MBSS prior to initiating PO diet. NPO with PEG feeds as source of nutrition/hydration/medication." The last charted note appreciated during chart review was the adult plan of care note at [facility] on 7/22/24, which stated the following: "Patient seen for dysphagia f/u. Patient continues with increased oral secretions with suctioning provided. Limited acceptance of PO trials with patient only accepting x5 tsp of puree with immediate cough in x2/5 trials. Unable to discern if cough is related to secretions or puree. Patient declined additional trials despite encouragement. Recommend to continue NPO. This service to f/u to assess potential for diet initiation with continuation of PO trials pending improvement in PO acceptance. " Please refer to complete reports for further information.      OBSERVATIONS:   Patient received sitting in his wheelchair in the lateral view. The patient was awake and alert, on RA, pain scale 0/10 pre & post MBS.      Oral peripheral examination revealed +facial symmetry, labial/lingual ROM/strength WFL, +velar movement, +adequate dentition.       CONSISTENCIES ADMINISTERED:   Solids:  Puree, Soft and bite sized, Regular solids    Liquids:  Thin liquids, Mildly thick liquids, Moderately thick liquids        SUMMARY & IMPRESSION    Preliminary Videofluoroscopic Evaluation Reveals:   - Adequate velopharyngeal movement     Videofluoroscopic Evaluation Reveals:    1. Mild oral dysphagia marked by mildly prolonged mastication for regular solids as well as reduced lingual ROM/strength/coordination resulting in posterior loss of the bolus to the oropharynx for puree, soft and bite sized, and regular solids as well as to the hypopharynx for thin liquids, mildly thick liquids, and moderately thick liquids. Of note, one instance of trace laryngeal penetration noted before the swallow above the level of the vocal cords without retrieval with thin liquids out of a total of seven trials. No aspiration viewed before the swallow with thin liquids. No penetration or aspiration viewed before the swallow with puree, soft and bite sized, regular solids, mildly thick liquids, or moderately thick liquids.    2. Mild pharyngeal dysphagia noted for puree, soft and bite sized, and regular solids marked by reduced tongue base retraction, epiglottic retroflexion, and hyolaryngeal excursion/elevation resulting in mild residue in the valleculae upon initial trial of puree with trace residue in the valleculae and trace-mild residue in the pyriform sinuses for subsequent trials of puree which cleared with a subsequent swallow and mild-moderate residue in the valleculae which reduced minimally with a subsequent swallow for soft and bite sized and regular solids. However, a liquid wash was employed and proved beneficial in reducing pharyngeal residue for soft and bite sized and regular solids. No penetration or aspiration viewed across consistencies.    3. Mild pharyngeal dysphagia noted for thin liquids, mildly thick liquids, and moderately thick liquids marked by reduced tongue base retraction, epiglottic retroflexion, pharyngeal contractility, and hyolaryngeal excursion/elevation resulting in mild-moderate residue in the valleculae as well as trace residue along the posterior pharyngeal wall and pyriform sinuses for moderately thick liquids, mild residue in the valleculae as well as trace residue along the posterior pharyngeal wall and pyriform sinuses for mildly thick liquids, and mild residue in the valleculae and pyriform sinuses and one instance of trace laryngeal penetration above the level of the vocal cords without retrieval during and after the swallow out of a total of seven trials for thin liquids. No aspiration viewed with thin liquids. No penetration or aspiration viewed with mildly thick liquids or moderately thick liquids. Of note, pharyngeal residue reduced with a subsequent swallow.    4. Pt with (+) instance of cough during the study in the absence of airway invasion. Of note, a MBS is a moment in time and does not capture the patient of the duration of a meal.     Esophageal screen:     - An esophageal screen was unable to be performed as the patient was unable to  AP view and given spacing limitations with patient's wheelchair.      Aspiration - Penetration Scale:    1- Puree, Soft and bite sized, Regular solids, Moderately thick liquids, Mildly thick liquids   1 & 3- Thin liquids      Aspiration-Penetration Scale Key: (Kathy et al Dysphagia 11: 93-98 (April 1996), Aspiration-Penetration Scale)   1. Material does not enter the airway   2. Material enters the airway, remains above the vocal folds and is ejected from the airway   3. Material enters the airway, remains above the vocal folds and is not ejected from the airway   4. Material enters the airway, contacts the vocal folds and is ejected from the airway   5. Material enters the airway, contacts the vocal folds and is not ejected from the airway    6. Material enters the airway, passes below the vocal folds and is ejected from the airway (aspiration)   7. Material enters the airway, passes below the vocal folds and is not ejected from the airway despite effort (aspiration)   8. Material enters the airway, passes below the vocal folds and no effort is made to eject (silent aspiration)      Results and recommendations were reviewed with the patient and his wife at which time both parties gave verbal understanding. Should you have any additional questions, please contact the [facility] at [phone].     Micth Mohamud MA CCC-SLP

## 2024-10-04 ENCOUNTER — INPATIENT (INPATIENT)
Facility: HOSPITAL | Age: 74
LOS: 33 days | Discharge: NURSING, REHAB OR LONG-TERM CARE | End: 2024-11-07
Attending: STUDENT IN AN ORGANIZED HEALTH CARE EDUCATION/TRAINING PROGRAM | Admitting: STUDENT IN AN ORGANIZED HEALTH CARE EDUCATION/TRAINING PROGRAM
Payer: MEDICARE

## 2024-10-04 VITALS
OXYGEN SATURATION: 91 % | DIASTOLIC BLOOD PRESSURE: 67 MMHG | RESPIRATION RATE: 20 BRPM | HEART RATE: 102 BPM | SYSTOLIC BLOOD PRESSURE: 176 MMHG | WEIGHT: 125 LBS | HEIGHT: 70 IN | TEMPERATURE: 98 F

## 2024-10-04 DIAGNOSIS — D50.9 IRON DEFICIENCY ANEMIA, UNSPECIFIED: ICD-10-CM

## 2024-10-04 DIAGNOSIS — N18.6 END STAGE RENAL DISEASE: ICD-10-CM

## 2024-10-04 DIAGNOSIS — J18.9 PNEUMONIA, UNSPECIFIED ORGANISM: ICD-10-CM

## 2024-10-04 DIAGNOSIS — Z93.1 GASTROSTOMY STATUS: Chronic | ICD-10-CM

## 2024-10-04 DIAGNOSIS — E11.9 TYPE 2 DIABETES MELLITUS WITHOUT COMPLICATIONS: ICD-10-CM

## 2024-10-04 DIAGNOSIS — K59.00 CONSTIPATION, UNSPECIFIED: ICD-10-CM

## 2024-10-04 DIAGNOSIS — I69.30 UNSPECIFIED SEQUELAE OF CEREBRAL INFARCTION: ICD-10-CM

## 2024-10-04 DIAGNOSIS — I16.0 HYPERTENSIVE URGENCY: ICD-10-CM

## 2024-10-04 DIAGNOSIS — E87.20 ACIDOSIS, UNSPECIFIED: ICD-10-CM

## 2024-10-04 DIAGNOSIS — J96.01 ACUTE RESPIRATORY FAILURE WITH HYPOXIA: ICD-10-CM

## 2024-10-04 DIAGNOSIS — Z98.890 OTHER SPECIFIED POSTPROCEDURAL STATES: Chronic | ICD-10-CM

## 2024-10-04 DIAGNOSIS — Z86.718 PERSONAL HISTORY OF OTHER VENOUS THROMBOSIS AND EMBOLISM: ICD-10-CM

## 2024-10-04 DIAGNOSIS — I77.0 ARTERIOVENOUS FISTULA, ACQUIRED: Chronic | ICD-10-CM

## 2024-10-04 LAB
ACANTHOCYTES BLD QL SMEAR: SLIGHT — SIGNIFICANT CHANGE UP
ANISOCYTOSIS BLD QL: SIGNIFICANT CHANGE UP
APTT BLD: 31 SEC — SIGNIFICANT CHANGE UP (ref 24.5–35.6)
BASOPHILS # BLD AUTO: 0 K/UL — SIGNIFICANT CHANGE UP (ref 0–0.2)
BASOPHILS NFR BLD AUTO: 0 % — SIGNIFICANT CHANGE UP (ref 0–2)
ELLIPTOCYTES BLD QL SMEAR: SLIGHT — SIGNIFICANT CHANGE UP
EOSINOPHIL # BLD AUTO: 0 K/UL — SIGNIFICANT CHANGE UP (ref 0–0.5)
EOSINOPHIL NFR BLD AUTO: 0 % — SIGNIFICANT CHANGE UP (ref 0–6)
FLUAV AG NPH QL: SIGNIFICANT CHANGE UP
FLUBV AG NPH QL: SIGNIFICANT CHANGE UP
GLUCOSE BLDC GLUCOMTR-MCNC: 144 MG/DL — HIGH (ref 70–99)
HCT VFR BLD CALC: 31.5 % — LOW (ref 39–50)
HGB BLD-MCNC: 9.9 G/DL — LOW (ref 13–17)
HYPOCHROMIA BLD QL: SIGNIFICANT CHANGE UP
INR BLD: 1.01 RATIO — SIGNIFICANT CHANGE UP (ref 0.85–1.16)
LACTATE SERPL-SCNC: 1.2 MMOL/L — SIGNIFICANT CHANGE UP (ref 0.7–2)
LACTATE SERPL-SCNC: 3.6 MMOL/L — HIGH (ref 0.7–2)
LG PLATELETS BLD QL AUTO: SLIGHT — SIGNIFICANT CHANGE UP
LYMPHOCYTES # BLD AUTO: 0.7 K/UL — LOW (ref 1–3.3)
LYMPHOCYTES # BLD AUTO: 4 % — LOW (ref 13–44)
MACROCYTES BLD QL: SLIGHT — SIGNIFICANT CHANGE UP
MANUAL SMEAR VERIFICATION: SIGNIFICANT CHANGE UP
MCHC RBC-ENTMCNC: 21.8 PG — LOW (ref 27–34)
MCHC RBC-ENTMCNC: 31.4 G/DL — LOW (ref 32–36)
MCV RBC AUTO: 69.2 FL — LOW (ref 80–100)
MICROCYTES BLD QL: SIGNIFICANT CHANGE UP
MONOCYTES # BLD AUTO: 0.52 K/UL — SIGNIFICANT CHANGE UP (ref 0–0.9)
MONOCYTES NFR BLD AUTO: 3 % — SIGNIFICANT CHANGE UP (ref 2–14)
NEUTROPHILS # BLD AUTO: 16.16 K/UL — HIGH (ref 1.8–7.4)
NEUTROPHILS NFR BLD AUTO: 89 % — HIGH (ref 43–77)
NEUTS BAND # BLD: 4 % — SIGNIFICANT CHANGE UP (ref 0–8)
NRBC # BLD: 0 /100 WBCS — SIGNIFICANT CHANGE UP (ref 0–0)
NRBC # BLD: SIGNIFICANT CHANGE UP /100 WBCS (ref 0–0)
OVALOCYTES BLD QL SMEAR: SLIGHT — SIGNIFICANT CHANGE UP
PLAT MORPH BLD: ABNORMAL
PLATELET # BLD AUTO: 322 K/UL — SIGNIFICANT CHANGE UP (ref 150–400)
PLATELET CLUMP BLD QL SMEAR: SLIGHT
POIKILOCYTOSIS BLD QL AUTO: SLIGHT — SIGNIFICANT CHANGE UP
POLYCHROMASIA BLD QL SMEAR: SLIGHT — SIGNIFICANT CHANGE UP
PROTHROM AB SERPL-ACNC: 11.4 SEC — SIGNIFICANT CHANGE UP (ref 9.9–13.4)
RBC # BLD: 4.55 M/UL — SIGNIFICANT CHANGE UP (ref 4.2–5.8)
RBC # FLD: 19.7 % — HIGH (ref 10.3–14.5)
RBC BLD AUTO: ABNORMAL
SARS-COV-2 RNA SPEC QL NAA+PROBE: SIGNIFICANT CHANGE UP
SCHISTOCYTES BLD QL AUTO: SLIGHT — SIGNIFICANT CHANGE UP
SMUDGE CELLS # BLD: PRESENT — SIGNIFICANT CHANGE UP
TARGETS BLD QL SMEAR: SLIGHT — SIGNIFICANT CHANGE UP
TROPONIN I, HIGH SENSITIVITY RESULT: 35.9 NG/L — SIGNIFICANT CHANGE UP
WBC # BLD: 17.38 K/UL — HIGH (ref 3.8–10.5)
WBC # FLD AUTO: 17.38 K/UL — HIGH (ref 3.8–10.5)

## 2024-10-04 PROCEDURE — 99285 EMERGENCY DEPT VISIT HI MDM: CPT

## 2024-10-04 PROCEDURE — 71045 X-RAY EXAM CHEST 1 VIEW: CPT | Mod: 26

## 2024-10-04 PROCEDURE — 74176 CT ABD & PELVIS W/O CONTRAST: CPT | Mod: 26,MC

## 2024-10-04 PROCEDURE — 93010 ELECTROCARDIOGRAM REPORT: CPT

## 2024-10-04 PROCEDURE — 99223 1ST HOSP IP/OBS HIGH 75: CPT

## 2024-10-04 PROCEDURE — 70450 CT HEAD/BRAIN W/O DYE: CPT | Mod: 26,MC

## 2024-10-04 PROCEDURE — 71250 CT THORAX DX C-: CPT | Mod: 26,MC

## 2024-10-04 RX ORDER — ASPIRIN/MAG CARB/ALUMINUM AMIN 325 MG
81 TABLET ORAL DAILY
Refills: 0 | Status: DISCONTINUED | OUTPATIENT
Start: 2024-10-04 | End: 2024-10-12

## 2024-10-04 RX ORDER — ONDANSETRON HYDROCHLORIDE 2 MG/ML
4 INJECTION, SOLUTION INTRAMUSCULAR; INTRAVENOUS EVERY 8 HOURS
Refills: 0 | Status: DISCONTINUED | OUTPATIENT
Start: 2024-10-04 | End: 2024-10-09

## 2024-10-04 RX ORDER — PANTOPRAZOLE SODIUM 40 MG/1
40 TABLET, DELAYED RELEASE ORAL DAILY
Refills: 0 | Status: DISCONTINUED | OUTPATIENT
Start: 2024-10-04 | End: 2024-10-23

## 2024-10-04 RX ORDER — MIDODRINE HYDROCHLORIDE 2.5 MG/1
5 TABLET ORAL
Refills: 0 | Status: DISCONTINUED | OUTPATIENT
Start: 2024-10-04 | End: 2024-10-04

## 2024-10-04 RX ORDER — B-COMPLEX WITH VITAMIN C
1 VIAL (ML) INJECTION DAILY
Refills: 0 | Status: DISCONTINUED | OUTPATIENT
Start: 2024-10-04 | End: 2024-10-23

## 2024-10-04 RX ORDER — APIXABAN 5 MG/1
2.5 TABLET, FILM COATED ORAL
Refills: 0 | Status: DISCONTINUED | OUTPATIENT
Start: 2024-10-04 | End: 2024-10-04

## 2024-10-04 RX ORDER — HYDRALAZINE HYDROCHLORIDE 50 MG/1
1 TABLET, FILM COATED ORAL
Refills: 0 | DISCHARGE

## 2024-10-04 RX ORDER — NIFEDIPINE 90 MG
1 TABLET, EXTENDED RELEASE 24 HR ORAL
Refills: 0 | DISCHARGE

## 2024-10-04 RX ORDER — HYDRALAZINE HYDROCHLORIDE 50 MG/1
50 TABLET, FILM COATED ORAL THREE TIMES A DAY
Refills: 0 | Status: DISCONTINUED | OUTPATIENT
Start: 2024-10-04 | End: 2024-10-06

## 2024-10-04 RX ORDER — PIPERACILLIN AND TAZOBACTAM .5; 4 G/20ML; G/20ML
3.38 INJECTION, POWDER, LYOPHILIZED, FOR SOLUTION INTRAVENOUS EVERY 12 HOURS
Refills: 0 | Status: DISCONTINUED | OUTPATIENT
Start: 2024-10-05 | End: 2024-10-08

## 2024-10-04 RX ORDER — INSULIN ASPART 100 [IU]/ML
0 INJECTION, SOLUTION INTRAVENOUS; SUBCUTANEOUS
Refills: 0 | DISCHARGE

## 2024-10-04 RX ORDER — MAGNESIUM, ALUMINUM HYDROXIDE 200-200 MG
30 TABLET,CHEWABLE ORAL EVERY 4 HOURS
Refills: 0 | Status: DISCONTINUED | OUTPATIENT
Start: 2024-10-04 | End: 2024-10-09

## 2024-10-04 RX ORDER — PANTOPRAZOLE SODIUM 40 MG/1
40 TABLET, DELAYED RELEASE ORAL ONCE
Refills: 0 | Status: COMPLETED | OUTPATIENT
Start: 2024-10-04 | End: 2024-10-04

## 2024-10-04 RX ORDER — FERROUS SULFATE 325(65) MG
300 TABLET ORAL DAILY
Refills: 0 | Status: DISCONTINUED | OUTPATIENT
Start: 2024-10-04 | End: 2024-10-23

## 2024-10-04 RX ORDER — INSULIN LISPRO 100/ML
VIAL (ML) SUBCUTANEOUS
Refills: 0 | Status: DISCONTINUED | OUTPATIENT
Start: 2024-10-04 | End: 2024-10-08

## 2024-10-04 RX ORDER — VANCOMYCIN HYDROCHLORIDE 50 MG/ML
1000 KIT ORAL ONCE
Refills: 0 | Status: COMPLETED | OUTPATIENT
Start: 2024-10-04 | End: 2024-10-04

## 2024-10-04 RX ORDER — PIPERACILLIN AND TAZOBACTAM .5; 4 G/20ML; G/20ML
3.38 INJECTION, POWDER, LYOPHILIZED, FOR SOLUTION INTRAVENOUS EVERY 8 HOURS
Refills: 0 | Status: DISCONTINUED | OUTPATIENT
Start: 2024-10-04 | End: 2024-10-04

## 2024-10-04 RX ORDER — POLYETHYLENE GLYCOL 3350 17 G/17G
17 POWDER, FOR SOLUTION ORAL DAILY
Refills: 0 | Status: DISCONTINUED | OUTPATIENT
Start: 2024-10-04 | End: 2024-10-08

## 2024-10-04 RX ORDER — NIFEDIPINE 90 MG
90 TABLET, EXTENDED RELEASE 24 HR ORAL DAILY
Refills: 0 | Status: DISCONTINUED | OUTPATIENT
Start: 2024-10-04 | End: 2024-10-04

## 2024-10-04 RX ORDER — GLUCAGON INJECTION, SOLUTION 1 MG/.2ML
1 INJECTION, SOLUTION SUBCUTANEOUS ONCE
Refills: 0 | Status: DISCONTINUED | OUTPATIENT
Start: 2024-10-04 | End: 2024-10-08

## 2024-10-04 RX ORDER — HYDRALAZINE HYDROCHLORIDE 50 MG/1
10 TABLET, FILM COATED ORAL ONCE
Refills: 0 | Status: COMPLETED | OUTPATIENT
Start: 2024-10-04 | End: 2024-10-04

## 2024-10-04 RX ORDER — CHOLESTEROL/SOYBEAN OIL/C/E 60-200-80
15 POWDER (GRAM) ORAL DAILY
Refills: 0 | Status: DISCONTINUED | OUTPATIENT
Start: 2024-10-04 | End: 2024-10-04

## 2024-10-04 RX ORDER — MELATONIN 5 MG
3 TABLET ORAL AT BEDTIME
Refills: 0 | Status: DISCONTINUED | OUTPATIENT
Start: 2024-10-04 | End: 2024-10-09

## 2024-10-04 RX ORDER — ALBUTEROL 90 MCG
2 AEROSOL (GRAM) INHALATION EVERY 6 HOURS
Refills: 0 | Status: DISCONTINUED | OUTPATIENT
Start: 2024-10-04 | End: 2024-10-04

## 2024-10-04 RX ORDER — ONDANSETRON HYDROCHLORIDE 2 MG/ML
4 INJECTION, SOLUTION INTRAMUSCULAR; INTRAVENOUS ONCE
Refills: 0 | Status: COMPLETED | OUTPATIENT
Start: 2024-10-04 | End: 2024-10-04

## 2024-10-04 RX ORDER — PIPERACILLIN AND TAZOBACTAM .5; 4 G/20ML; G/20ML
3.38 INJECTION, POWDER, LYOPHILIZED, FOR SOLUTION INTRAVENOUS ONCE
Refills: 0 | Status: COMPLETED | OUTPATIENT
Start: 2024-10-04 | End: 2024-10-04

## 2024-10-04 RX ORDER — B-COMPLEX WITH VITAMIN C
1 VIAL (ML) INJECTION
Refills: 0 | DISCHARGE

## 2024-10-04 RX ORDER — ACETAMINOPHEN 500 MG
650 TABLET ORAL EVERY 6 HOURS
Refills: 0 | Status: DISCONTINUED | OUTPATIENT
Start: 2024-10-04 | End: 2024-10-09

## 2024-10-04 RX ORDER — ACETAMINOPHEN 500 MG
1000 TABLET ORAL ONCE
Refills: 0 | Status: COMPLETED | OUTPATIENT
Start: 2024-10-04 | End: 2024-10-04

## 2024-10-04 RX ORDER — INSULIN GLARGINE,HUM.REC.ANLOG 100/ML
10 VIAL (ML) SUBCUTANEOUS AT BEDTIME
Refills: 0 | Status: DISCONTINUED | OUTPATIENT
Start: 2024-10-04 | End: 2024-10-08

## 2024-10-04 RX ADMIN — PANTOPRAZOLE SODIUM 40 MILLIGRAM(S): 40 TABLET, DELAYED RELEASE ORAL at 16:39

## 2024-10-04 RX ADMIN — Medication 250 MILLILITER(S): at 18:14

## 2024-10-04 RX ADMIN — Medication 250 MILLILITER(S): at 16:40

## 2024-10-04 RX ADMIN — HYDRALAZINE HYDROCHLORIDE 50 MILLIGRAM(S): 50 TABLET, FILM COATED ORAL at 21:08

## 2024-10-04 RX ADMIN — HYDRALAZINE HYDROCHLORIDE 10 MILLIGRAM(S): 50 TABLET, FILM COATED ORAL at 18:34

## 2024-10-04 RX ADMIN — VANCOMYCIN HYDROCHLORIDE 250 MILLIGRAM(S): KIT at 20:25

## 2024-10-04 RX ADMIN — Medication 10 UNIT(S): at 21:08

## 2024-10-04 RX ADMIN — ONDANSETRON HYDROCHLORIDE 4 MILLIGRAM(S): 2 INJECTION, SOLUTION INTRAMUSCULAR; INTRAVENOUS at 16:38

## 2024-10-04 RX ADMIN — PIPERACILLIN AND TAZOBACTAM 200 GRAM(S): .5; 4 INJECTION, POWDER, LYOPHILIZED, FOR SOLUTION INTRAVENOUS at 18:32

## 2024-10-04 RX ADMIN — Medication 20 MILLIGRAM(S): at 21:08

## 2024-10-04 RX ADMIN — Medication 400 MILLIGRAM(S): at 17:48

## 2024-10-04 NOTE — H&P ADULT - NSICDXPASTSURGICALHX_GEN_ALL_CORE_FT
PAST SURGICAL HISTORY:  Arteriovenous fistula     History of tracheostomy     S/P percutaneous endoscopic gastrostomy (PEG) tube placement

## 2024-10-04 NOTE — H&P ADULT - NSHPPHYSICALEXAM_GEN_ALL_CORE
T(C): 37.7 (10-04-24 @ 21:40), Max: 38.5 (10-04-24 @ 16:52)  HR: 89 (10-04-24 @ 21:40) (89 - 118)  BP: 160/57 (10-04-24 @ 21:40) (148/54 - 201/68)  RR: 17 (10-04-24 @ 21:40) (17 - 20)  SpO2: 92% (10-04-24 @ 21:40) (91% - 95%)    CONSTITUTIONAL: Well groomed, no apparent distress  EYES: PERRLA and symmetric, EOMI  ENMT: Oral mucosa with moist membranes  RESP: No respiratory distress, no use of accessory muscles; diminished breath sounds b/l  CV: tachycardic, regular rhythm, RUE AV fistula with audible and palpable thrill  GI: Soft, NT, ND, LUQ PEG tube in place without surrounding erythema

## 2024-10-04 NOTE — PHARMACOTHERAPY INTERVENTION NOTE - COMMENTS
As per policy, adjusted piperacillin/tazobactam frequency from q8h to q12h as pt is in ESRD and has a CrCl of < 20mL/min.  As per policy, adjusted piperacillin/tazobactam frequency from q8h to q12h as pt has ESRD and has a CrCl of < 20mL/min.

## 2024-10-04 NOTE — ED PROVIDER NOTE - NSICDXPASTMEDICALHX_GEN_ALL_CORE_FT
PAST MEDICAL HISTORY:  Benign essential HTN     CVA (cerebrovascular accident) right side weakness    Diabetes     ESRD on hemodialysis     HLD (hyperlipidemia)     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

## 2024-10-04 NOTE — CONSULT NOTE ADULT - SUBJECTIVE AND OBJECTIVE BOX
Patient chart reviewed, full consult to follow.     Thank you for the courtesy of this consultation. [facility] NEPHROLOGY SERVICES, [facility]  NEPHROLOGY AND HYPERTENSION  [address]  [address]  [place] [address]  [phone]    MD RONNA CHANG MD YELENA ROSENBERG, MD BINNY KOSHY, MD CHRISTOPHER CAPUTO, MD EDWARD BOVER, MD      Information from chart:  "Patient is a 73y old  Male who presents with a chief complaint of   HPI:   "    PAST MEDICAL & SURGICAL HISTORY:  Diabetes      Benign essential HTN      HLD (hyperlipidemia)      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      ESRD on hemodialysis      CVA (cerebrovascular accident)  right side weakness      Arteriovenous fistula        FAMILY HISTORY:  FHx: diabetes mellitus (Father, Aunt)      Allergies    No Known Allergies    Intolerances      Home Medications:  acetaminophen 325 mg oral tablet: 2 tab(s) orally every 6 hours as needed for temperature or pain (24 Jul 2024 11:29)  aspirin 81 mg oral tablet, chewable: 1 tab(s) orally once a day via peg (24 Jul 2024 11:29)  hydrALAZINE 50 mg oral tablet: 1 tab(s) by gastrostomy tube 3 times a day (04 Oct 2024 19:18)  Nephro-Noam oral tablet: 1 tab(s) by gastrostomy tube once a day (04 Oct 2024 19:20)  NIFEdipine 90 mg oral tablet, extended release: 1 tab(s) by gastrostomy tube once a day (04 Oct 2024 19:18)  NovoLOG 100 units/mL subcutaneous solution: subcutaneous 3 times a day (before meals) sliding scale (04 Oct 2024 19:20)    MEDICATIONS  (STANDING):  aspirin  chewable 81 milliGRAM(s) Enteral Tube daily  atorvastatin 20 milliGRAM(s) Oral at bedtime  dextrose 5%. 1000 milliLiter(s) (50 mL/Hr) IV Continuous <Continuous>  dextrose 5%. 1000 milliLiter(s) (100 mL/Hr) IV Continuous <Continuous>  dextrose 50% Injectable 25 Gram(s) IV Push once  dextrose 50% Injectable 25 Gram(s) IV Push once  dextrose 50% Injectable 12.5 Gram(s) IV Push once  ferrous    sulfate Liquid 300 milliGRAM(s) Enteral Tube daily  glucagon  Injectable 1 milliGRAM(s) IntraMuscular once  hydrALAZINE 50 milliGRAM(s) Oral three times a day  insulin glargine Injectable (LANTUS) 10 Unit(s) SubCutaneous at bedtime  insulin lispro (ADMELOG) corrective regimen sliding scale   SubCutaneous three times a day before meals  Nephro-noam 1 Tablet(s) Oral daily  pantoprazole   Suspension 40 milliGRAM(s) Enteral Tube daily  polyethylene glycol 3350 17 Gram(s) Oral daily    MEDICATIONS  (PRN):  acetaminophen     Tablet .. 650 milliGRAM(s) Oral every 6 hours PRN Temp greater or equal to 38C (100.4F), Mild Pain (1 - 3)  aluminum hydroxide/magnesium hydroxide/simethicone Suspension 30 milliLiter(s) Oral every 4 hours PRN Dyspepsia  dextrose Oral Gel 15 Gram(s) Oral once PRN Blood Glucose LESS THAN 70 milliGRAM(s)/deciliter  melatonin 3 milliGRAM(s) Oral at bedtime PRN Insomnia  ondansetron Injectable 4 milliGRAM(s) IV Push every 8 hours PRN Nausea and/or Vomiting    Vital Signs Last 24 Hrs  T(C): 37.7 (04 Oct 2024 21:40), Max: 38.5 (04 Oct 2024 16:52)  T(F): 99.9 (04 Oct 2024 21:40), Max: 101.3 (04 Oct 2024 16:52)  HR: 89 (04 Oct 2024 21:40) (89 - 118)  BP: 160/57 (04 Oct 2024 21:40) (148/54 - 201/68)  BP(mean): --  RR: 17 (04 Oct 2024 21:40) (17 - 20)  SpO2: 92% (04 Oct 2024 21:40) (91% - 95%)    Parameters below as of 04 Oct 2024 21:40  Patient On (Oxygen Delivery Method): room air        Daily Height in cm: 177.8 (04 Oct 2024 14:14)    Daily     CAPILLARY BLOOD GLUCOSE      POCT Blood Glucose.: 144 mg/dL (04 Oct 2024 20:31)  POCT Blood Glucose.: 186 mg/dL (04 Oct 2024 15:19)    PHYSICAL EXAM:      T(C): 37.7 (10-04-24 @ 21:40), Max: 38.5 (10-04-24 @ 16:52)  HR: 89 (10-04-24 @ 21:40) (89 - 118)  BP: 160/57 (10-04-24 @ 21:40) (148/54 - 201/68)  RR: 17 (10-04-24 @ 21:40) (17 - 20)  SpO2: 92% (10-04-24 @ 21:40) (91% - 95%)  Wt(kg): --  Lungs clear  Heart S1S2  Abd soft NT ND   + PEG   Extremities:   tr edema              10-04    129[L]  |  93[L]  |  61[H]  ----------------------------<  149[H]  4.0   |  24  |  4.05[H]    Ca    10.1      04 Oct 2024 16:40  Mg     2.6     10-04    TPro  8.8[H]  /  Alb  3.9  /  TBili  0.9  /  DBili  x   /  AST  22  /  ALT  25  /  AlkPhos  169[H]  10-04                          9.9    17.38 )-----------( 322      ( 04 Oct 2024 16:40 )             31.5     Creatinine Trend: 4.05<--  Urinalysis Basic - ( 04 Oct 2024 16:40 )    Color: x / Appearance: x / SG: x / pH: x  Gluc: 149 mg/dL / Ketone: x  / Bili: x / Urobili: x   Blood: x / Protein: x / Nitrite: x   Leuk Esterase: x / RBC: x / WBC x   Sq Epi: x / Non Sq Epi: x / Bacteria: x            Assessment   ESRD, HTN urgency, leukocytosis related to PNA    Plan  IV abx   Resume BP medications  HD for tomorrow     Delonte Moya MD

## 2024-10-04 NOTE — H&P ADULT - NSHPLABSRESULTS_GEN_ALL_CORE
9.9    17.38 )-----------( 322      ( 04 Oct 2024 16:40 )             31.5     129[L]  |  93[L]  |  61[H]  ----------------------------<  149[H]     10-04  4.0   |  24  |  4.05[H]    Ca    10.1      04 Oct 2024 16:40  Mg     2.6     10-04    TPro  8.8[H]  /  Alb  3.9  /  TBili  0.9  /  DBili  x   /  AST  22  /  ALT  25  /  AlkPhos  169[H]  10-04    PT/INR: 11.4/1.01 (10-04-24 @ 16:40)  PTT: 31.0 (10-04-24 @ 16:40)    CT head without contrast 10/4/24  IMPRESSION:  No evidence of acute hemorrhage mass or mass effect    Encephalomalacia and gliosis again seen involving the bifrontal region.    CT CAP without contrast 10/4/24  FINDINGS:  Evaluation of the solid organs and vasculature is limited without intravenous contrast.    CHEST:  LUNGS AND LARGE AIRWAYS: Secretions in the right mainstem and right lower lobe bronchus. Right lower lobe consolidation and patchy opacities in the right upper lobe. Left lower lobe calcified granuloma.  PLEURA: No pleural effusion.  VESSELS: Aortic calcifications. Coronary artery calcifications.  HEART: Heart size is normal. No pericardial effusion.  MEDIASTINUM AND LISA: No lymphadenopathy.  CHEST WALL AND LOWER NECK: Within normal limits.    ABDOMEN AND PELVIS:  LIVER: Within normal limits.  BILE DUCTS: Normal caliber.  GALLBLADDER: Within normal limits.  SPLEEN: Within normal limits.  PANCREAS: Within normal limits.  ADRENALS: Within normal limits.  KIDNEYS/URETERS: Atrophic kidneys. No hydronephrosis or calculi.    BLADDER: Within normal limits.  REPRODUCTIVE ORGANS: Prostate is enlarged.    BOWEL: No bowel obstruction. Appendix is normal. PEG tube in place. Rectum distended with stool.  PERITONEUM/RETROPERITONEUM: Within normal limits.  VESSELS: Atherosclerotic changes.  LYMPH NODES: No lymphadenopathy.  ABDOMINAL WALL: Degenerative changes.  BONES: Degenerative changes.    IMPRESSION:  Pneumonia.    Rectum distended with stool.

## 2024-10-04 NOTE — ED ADULT TRIAGE NOTE - CHIEF COMPLAINT QUOTE
BIBA- from home  past few days has not been feeling well. Today after dialysis witnessed syncope when he got home and witnessed with EMS  HX ESRD dialysis M/W/F right AV fistula, DM, HTN, stroke  EMS POX 80%,

## 2024-10-04 NOTE — H&P ADULT - NSICDXPASTMEDICALHX_GEN_ALL_CORE_FT
Patient states that for a few weeks he has increasing chest pain and shortness of breath with activity.  Thought this was just his stomach, and was taking antacids.    
PAST MEDICAL HISTORY:  ESRD on hemodialysis     History of cerebrovascular accident (CVA) with residual deficit     History of DVT of lower extremity     HTN (hypertension)     Insulin dependent type 2 diabetes mellitus

## 2024-10-04 NOTE — H&P ADULT - HISTORY OF PRESENT ILLNESS
Megha Art is a 73 year old male with PMHx of HTN, IDDM2, ESRD on HD (M/W/F, through RUE AV fistula), hx of CVA x 2 (with residual R. sided weakness and dysphagia s/p PEG tube, previously with tracheostomy and now removed), and hx of RLE DVT (completed apixaban course) who presented to the ED on 10/4/24 for complaints of cough and chills.    History obtained from wife at bedside. As per wife, patient typically coughs at baseline since he previously had a tracheostomy. However, for the past 2-3 days, he has been coughing more frequently and it has been productive with yellow phlegm. Wife was assisting him with getting dressed this afternoon around 1 PM when he felt nauseous and started complaining of abdominal pain. Then had a bowel movement. When wife was cleaning him up, patient was shaking "like he had chills" and she thinks his eyes rolled back for a few seconds. No loss of consciousness. EMS was called and another episode of shaking upon EMS arrival. Of note, patient is due for dialysis today but did not receive it since he came to the ER today. Baseline functional status is wheelchair bound and dependent with all ADLs. NPO with Nepro q4. Lives at home with wife.     In the ED, Tmax 101.3, HR as elevated as 118, BP as elevated as 201/68, and SpO2 as low as 91% on room air. Initially placed on 15L NRB with improvement of SpO2 to 95%. Now on room air. WBC 17.38K, hgb 9.9, sodium 129, BUN 61, Cr 4.05, alkphos 169. Lactic acid 3.6. CT head without evidence of acute hemorrhage mass or mass effect but with encephalomalacia and gliosis again seen involving the bifrontal region. CT CAP with pneumonia and rectum distended with stool. Received  cc bolus, vancomycin 1 g IV, zosyn 3.375 g IV, acetaminophen 1 g IV, hydralazine 10 mg IV, pantoprazole 40 mg IV, and ondansetron 4 mg IV. Megha Art is a 73 year old male with PMHx of HTN, IDDM2, ESRD on HD (M/W/F, through RUE AV fistula), hx of CVA x 2 (with residual R. sided weakness and dysphagia s/p PEG tube, previously with tracheostomy and now removed), and hx of RLE DVT (completed apixaban course) who presented to the ED on 10/4/24 for complaints of cough and chills.    History obtained from wife at bedside. As per wife, patient typically coughs at baseline since he previously had a tracheostomy. However, for the past 2-3 days, he has been coughing more frequently and it has been productive with yellow phlegm. Wife was assisting him with getting dressed this afternoon around 1 PM when he felt nauseous and started complaining of abdominal pain. Then had a bowel movement. When wife was cleaning him up, patient was shaking "like he had chills" and she thinks his eyes rolled back for a few seconds. No loss of consciousness. EMS was called and another episode of shaking upon EMS arrival. Of note, patient is due for dialysis today but did not receive it since he came to the ER today. Baseline functional status is wheelchair bound and dependent with all ADLs. NPO with Nepro q4. Lives at home with wife.     In the ED, Tmax 101.3, HR as elevated as 118, BP as elevated as 201/68, and SpO2 as low as 91% on room air. Initially placed on 15L NRB with improvement of SpO2 to 95%. Now on room air. WBC 17.38K, hgb 9.9, sodium 129, BUN 61, Cr 4.05, alkphos 169. Lactic acid 3.6. CT head without evidence of acute hemorrhage mass or mass effect but with encephalomalacia and gliosis again seen involving the bifrontal region. CT CAP with pneumonia and rectum distended with stool. Received  cc bolus, vancomycin 1 g IV, zosyn 3.375 g IV, acetaminophen 1 g IV, hydralazine 10 mg IV, pantoprazole 40 mg IV, and ondansetron 4 mg IV. Evaluated by nephrology who is planning for HD tomorrow.

## 2024-10-04 NOTE — ED PROVIDER NOTE - CONSTITUTIONAL, MLM
Well appearing, awake, alert, oriented to person, place, time/situation and in no apparent distress. Speaking in clear full sentences no nasal flaring no shoulders retractions no diaphoresis, appears very comfortable normal...

## 2024-10-04 NOTE — ED ADULT NURSE NOTE - OBJECTIVE STATEMENT
Covering for primary RN. 73 yr old male AOx4. C/o syncopal episode this morning lasting a few seconds. Witnessed by wife. Wife reports pt was c/o nausea and abdominal pain. Started to have a BM, and while wife turned pt his "eyes rolled back." Pt denies any abdominal pain and nausea at this time. States "I feel fine." Did not attend dialysis today. PMH of DM, HTN, HLD, ESRD with dialysis MWF and R AV fistula, and CVA with residual right sided weakness. Uses a wheelchair.

## 2024-10-04 NOTE — ED ADULT NURSE NOTE - NSFALLRISKINTERV_ED_ALL_ED

## 2024-10-04 NOTE — H&P ADULT - ASSESSMENT
Megha Art is a 73 year old male with PMHx of HTN, IDDM2, ESRD on HD (M/W/F, through RUE AV fistula), hx of CVA x 2 (with residual R. sided weakness and dysphagia s/p PEG tube, previously with tracheostomy and now removed), and hx of RLE DVT (completed apixaban course) who presented to the ED on 10/4/24 for complaints of cough and chills and admitted for sepsis and acute hypoxic respiratory failure secondary to suspected aspiration PNA vs. HCAP.    Sepsis and acute hypoxic respiratory failure secondary to suspected aspiration PNA vs. HCAP  Wife reports patient with increased productive cough with yellow phlegm x 2-3 days  Temp 101.3, , WBC 17.38K on admission  SpO2 as low as 91% on room air, placed on 15L NRB with improvement of SpO2 to 95%, now SpO2 mid 90s on room air  CT chest with secretions in the R. mainstem and RLL bronchus, RLL consolidation and patchy opacities in the RUL  S/p  cc bolus, vancomycin 1 g IV, zosyn 3.375 g IV, and acetaminophen 1 g IV in the ED  Zosyn 3.375 g IV continued for now for empiric HCAP coverage given recent hospitalization  F/u procal, MRSA/MSSA PCR, random vanco trough for vanco dosing given on HD  F/u urine Legionella, urine Strep, Mycoplasma IgM  F/u blood cultures  Monitor fever curve and WBC trend  Telemetry for continuous pulse ox    Hypertensive urgency  BP as elevated as 201/68 on admission  S/p hydralazine 10 mg IV in the ED  PTA hydralazine 50 mg TID, nifedipine ER 90 mg held given ER cannot be crushed, discussed with pharmacist  F/u TSH, lipid panel, A1c for risk stratification  Goal is to reduce MAP by no more than 25-30% in first 2-4 hours, short term goal < 160 / < 100  Telemetry    Constipation  CT A/P with rectum distended with stool  PTA Miralax 17 g daily  Consider addition of senna and docusate if no BM    Tube feeds  Patient has been NPO with tube feeds since CVA as per wife  Tube feeding modality is bolus of Nepro with carb steady 237 mL q4 administered through syringe  Nutrition consulted    Lactic acidosis, resolved  Lactic acid 3.6 on admission  S/p  cc bolus in the ED      Chronic medical conditions:  IDDM2: last known A1c 5.9 (on 7/16/24), POC qac and qhs, PTA Lantus 10 U qhs, PTA Novolog SSI reordered as Humalog SSI, blood glucose goal < 180, f/u A1c  ESRD on HD (M/W/F, through RUE AV fistula): PTA nephrovite, nephrology following  Hx of CVA x 2 (with residual R. sided weakness and dysphagia s/p PEG tube, previously with tracheostomy and now removed): PTA ASA 81 mg, atorvastatin 20 mg  Hx of RLE DVT (which was possibly provoked secondary to COVID19 infection?): completed apixaban course  Microcytic anemia, likely with component of anemia of ESRD: hgb 9.9 on admission, appears better than baseline, no signs of active bleeding    Medication reconciliation completed using med list provided by wife at bedside.    Plan of care discussed with wife at bedside. Megha Art is a 73 year old male with PMHx of HTN, IDDM2, ESRD on HD (M/W/F, through RUE AV fistula), hx of CVA x 2 (with residual R. sided weakness and dysphagia s/p PEG tube, previously with tracheostomy and now removed), and hx of RLE DVT (completed apixaban course) who presented to the ED on 10/4/24 for complaints of cough and chills and admitted for sepsis and acute hypoxic respiratory failure secondary to suspected aspiration PNA vs. HCAP.    Sepsis and acute hypoxic respiratory failure secondary to suspected aspiration PNA vs. HCAP  Wife reports patient with increased productive cough with yellow phlegm x 2-3 days  Temp 101.3, , WBC 17.38K on admission  SpO2 as low as 91% on room air, placed on 15L NRB with improvement of SpO2 to 95%, now SpO2 mid 90s on room air  CT chest with secretions in the R. mainstem and RLL bronchus, RLL consolidation and patchy opacities in the RUL  S/p  cc bolus, vancomycin 1 g IV, zosyn 3.375 g IV, and acetaminophen 1 g IV in the ED  Zosyn 3.375 g IV continued for now for empiric HCAP coverage given recent hospitalization  F/u procal, MRSA/MSSA PCR, random vanco trough for vanco dosing given on HD  F/u urine Legionella, urine Strep, Mycoplasma IgM  F/u blood cultures  Monitor fever curve and WBC trend  Telemetry for continuous pulse ox    Hypertensive urgency  BP as elevated as 201/68 on admission  S/p hydralazine 10 mg IV in the ED  PTA hydralazine 50 mg TID, nifedipine ER 90 mg held given ER cannot be crushed, discussed with pharmacist  F/u TSH, lipid panel, A1c for risk stratification  Goal is to reduce MAP by no more than 25-30% in first 2-4 hours, short term goal < 160 / < 100  Telemetry    Constipation  CT A/P with rectum distended with stool  PTA Miralax 17 g daily  Consider addition of senna and docusate if no BM    Tube feeds  Patient has been NPO with tube feeds since CVA as per wife  Tube feeding modality is bolus of Nepro with carb steady 237 mL q4 administered through syringe  Nutrition consulted    Lactic acidosis, resolved  Lactic acid 3.6 on admission  S/p  cc bolus in the ED      Chronic medical conditions:  IDDM2: last known A1c 5.9 (on 7/16/24), POC qac and qhs, PTA Lantus 10 U qhs, PTA Novolog SSI reordered as Humalog SSI, blood glucose goal < 180, f/u A1c  ESRD on HD (M/W/F, through RUE AV fistula): PTA nephrovite, did not attend HD session today since he was in the ER, plan for HD in AM, nephrology following  Hx of CVA x 2 (with residual R. sided weakness and dysphagia s/p PEG tube, previously with tracheostomy and now removed): PTA ASA 81 mg, atorvastatin 20 mg  Hx of RLE DVT (which was possibly provoked secondary to COVID19 infection?): completed apixaban course  Microcytic anemia, likely with component of anemia of ESRD: hgb 9.9 on admission, appears better than baseline, no signs of active bleeding    Medication reconciliation completed using med list provided by wife at bedside.    Plan of care discussed with wife at bedside.

## 2024-10-04 NOTE — ED PROVIDER NOTE - NONTENDER LOCATION
+ PEG/left upper quadrant/right upper quadrant/left lower quadrant/right lower quadrant/periumbilical/umbilical/suprapubic/left costovertebral angle/right costovertebral angle

## 2024-10-04 NOTE — ED PROVIDER NOTE - MUSCULOSKELETAL, MLM
Spine appears normal, range of motion is not limited, no muscle or joint tenderness no edema non tender to palp bilateral legs or calfs

## 2024-10-05 ENCOUNTER — NON-APPOINTMENT (OUTPATIENT)
Age: 74
End: 2024-10-05

## 2024-10-05 PROBLEM — E78.5 HYPERLIPIDEMIA, UNSPECIFIED: Chronic | Status: INACTIVE | Noted: 2022-12-13 | Resolved: 2024-10-04

## 2024-10-05 PROBLEM — I10 ESSENTIAL (PRIMARY) HYPERTENSION: Chronic | Status: INACTIVE | Noted: 2022-12-13 | Resolved: 2024-10-04

## 2024-10-05 PROBLEM — I63.9 CEREBRAL INFARCTION, UNSPECIFIED: Chronic | Status: INACTIVE | Noted: 2024-10-04 | Resolved: 2024-10-04

## 2024-10-05 PROBLEM — N18.6 END STAGE RENAL DISEASE: Chronic | Status: INACTIVE | Noted: 2022-12-13 | Resolved: 2024-10-04

## 2024-10-05 LAB
A1C WITH ESTIMATED AVERAGE GLUCOSE RESULT: 4.8 % — SIGNIFICANT CHANGE UP (ref 4–5.6)
ANION GAP SERPL CALC-SCNC: 7 MMOL/L — SIGNIFICANT CHANGE UP (ref 5–17)
BUN SERPL-MCNC: 73 MG/DL — HIGH (ref 7–23)
CALCIUM SERPL-MCNC: 9.3 MG/DL — SIGNIFICANT CHANGE UP (ref 8.5–10.1)
CHLORIDE SERPL-SCNC: 93 MMOL/L — LOW (ref 96–108)
CHOLEST SERPL-MCNC: 89 MG/DL — SIGNIFICANT CHANGE UP
CK MB BLD-MCNC: <3.4 % — SIGNIFICANT CHANGE UP (ref 0–3.5)
CK MB CFR SERPL CALC: <1 NG/ML — SIGNIFICANT CHANGE UP (ref 0.5–3.6)
CK SERPL-CCNC: 29 U/L — SIGNIFICANT CHANGE UP (ref 26–308)
CO2 SERPL-SCNC: 27 MMOL/L — SIGNIFICANT CHANGE UP (ref 22–31)
CREAT SERPL-MCNC: 4.79 MG/DL — HIGH (ref 0.5–1.3)
EGFR: 12 ML/MIN/1.73M2 — LOW
ESTIMATED AVERAGE GLUCOSE: 91 MG/DL — SIGNIFICANT CHANGE UP (ref 68–114)
GLUCOSE BLDC GLUCOMTR-MCNC: 127 MG/DL — HIGH (ref 70–99)
GLUCOSE BLDC GLUCOMTR-MCNC: 156 MG/DL — HIGH (ref 70–99)
GLUCOSE BLDC GLUCOMTR-MCNC: 160 MG/DL — HIGH (ref 70–99)
GLUCOSE BLDC GLUCOMTR-MCNC: 162 MG/DL — HIGH (ref 70–99)
GLUCOSE BLDC GLUCOMTR-MCNC: 184 MG/DL — HIGH (ref 70–99)
GLUCOSE SERPL-MCNC: 150 MG/DL — HIGH (ref 70–99)
HCT VFR BLD CALC: 25 % — LOW (ref 39–50)
HDLC SERPL-MCNC: 60 MG/DL — SIGNIFICANT CHANGE UP
HGB BLD-MCNC: 7.9 G/DL — LOW (ref 13–17)
LIPID PNL WITH DIRECT LDL SERPL: 17 MG/DL — SIGNIFICANT CHANGE UP
MCHC RBC-ENTMCNC: 21.5 PG — LOW (ref 27–34)
MCHC RBC-ENTMCNC: 31.6 G/DL — LOW (ref 32–36)
MCV RBC AUTO: 68.1 FL — LOW (ref 80–100)
NON HDL CHOLESTEROL: 30 MG/DL — SIGNIFICANT CHANGE UP
NRBC # BLD: 0 /100 WBCS — SIGNIFICANT CHANGE UP (ref 0–0)
PLATELET # BLD AUTO: 211 K/UL — SIGNIFICANT CHANGE UP (ref 150–400)
POTASSIUM SERPL-MCNC: 4.4 MMOL/L — SIGNIFICANT CHANGE UP (ref 3.5–5.3)
POTASSIUM SERPL-SCNC: 4.4 MMOL/L — SIGNIFICANT CHANGE UP (ref 3.5–5.3)
PROCALCITONIN SERPL-MCNC: 22.5 NG/ML — HIGH (ref 0.02–0.1)
RBC # BLD: 3.67 M/UL — LOW (ref 4.2–5.8)
RBC # FLD: 19.4 % — HIGH (ref 10.3–14.5)
SODIUM SERPL-SCNC: 127 MMOL/L — LOW (ref 135–145)
TRIGL SERPL-MCNC: 45 MG/DL — SIGNIFICANT CHANGE UP
TROPONIN I, HIGH SENSITIVITY RESULT: 46.6 NG/L — SIGNIFICANT CHANGE UP
TSH SERPL-MCNC: 1.66 UU/ML — SIGNIFICANT CHANGE UP (ref 0.36–3.74)
VANCOMYCIN FLD-MCNC: 15.6 UG/ML — SIGNIFICANT CHANGE UP
WBC # BLD: 14.32 K/UL — HIGH (ref 3.8–10.5)
WBC # FLD AUTO: 14.32 K/UL — HIGH (ref 3.8–10.5)

## 2024-10-05 PROCEDURE — 93010 ELECTROCARDIOGRAM REPORT: CPT

## 2024-10-05 PROCEDURE — 99232 SBSQ HOSP IP/OBS MODERATE 35: CPT

## 2024-10-05 RX ADMIN — Medication 81 MILLIGRAM(S): at 18:01

## 2024-10-05 RX ADMIN — HYDRALAZINE HYDROCHLORIDE 50 MILLIGRAM(S): 50 TABLET, FILM COATED ORAL at 18:01

## 2024-10-05 RX ADMIN — PIPERACILLIN AND TAZOBACTAM 25 GRAM(S): .5; 4 INJECTION, POWDER, LYOPHILIZED, FOR SOLUTION INTRAVENOUS at 18:03

## 2024-10-05 RX ADMIN — Medication 1: at 17:19

## 2024-10-05 RX ADMIN — Medication 300 MILLIGRAM(S): at 18:06

## 2024-10-05 RX ADMIN — HYDRALAZINE HYDROCHLORIDE 50 MILLIGRAM(S): 50 TABLET, FILM COATED ORAL at 22:33

## 2024-10-05 RX ADMIN — PANTOPRAZOLE SODIUM 40 MILLIGRAM(S): 40 TABLET, DELAYED RELEASE ORAL at 18:06

## 2024-10-05 RX ADMIN — Medication 1: at 08:43

## 2024-10-05 RX ADMIN — Medication 20 MILLIGRAM(S): at 22:32

## 2024-10-05 RX ADMIN — Medication 10 UNIT(S): at 22:32

## 2024-10-05 RX ADMIN — Medication 1: at 12:20

## 2024-10-05 RX ADMIN — HYDRALAZINE HYDROCHLORIDE 50 MILLIGRAM(S): 50 TABLET, FILM COATED ORAL at 04:51

## 2024-10-05 RX ADMIN — PIPERACILLIN AND TAZOBACTAM 25 GRAM(S): .5; 4 INJECTION, POWDER, LYOPHILIZED, FOR SOLUTION INTRAVENOUS at 02:39

## 2024-10-05 RX ADMIN — Medication 1 TABLET(S): at 18:06

## 2024-10-05 NOTE — PROGRESS NOTE ADULT - SUBJECTIVE AND OBJECTIVE BOX
S/p stable HD today, comfortable on RA    Vital Signs Last 24 Hrs  T(C): 37.1 (10-05-24 @ 17:06), Max: 37.4 (10-04-24 @ 23:05)  T(F): 98.8 (10-05-24 @ 17:06), Max: 99.3 (10-04-24 @ 23:05)  HR: 90 (10-05-24 @ 17:25) (60 - 90)  BP: 159/76 (10-05-24 @ 17:06) (122/71 - 201/63)  BP(mean): 103 (10-05-24 @ 17:06) (103 - 103)  RR: 19 (10-05-24 @ 17:06) (17 - 19)  SpO2: 95% (10-05-24 @ 17:06) (95% - 98%)    I&O's Detail    05 Oct 2024 07:01  -  05 Oct 2024 22:48  -------------------------------------------------------  OUT:    Other (mL): 2000 mL  Total OUT: 2000 mL    Lungs b/l air entry  Heart S1S2  Abd soft ND   + PEG   Extremities no edema                        7.9    14.32 )-----------( 211      ( 05 Oct 2024 08:37 )             25.0     05 Oct 2024 08:37    127    |  93     |  73     ----------------------------<  150    4.4     |  27     |  4.79     Ca    9.3        05 Oct 2024 08:37  Mg     2.6       04 Oct 2024 16:40    TPro  8.8    /  Alb  3.9    /  TBili  0.9    /  DBili  x      /  AST  22     /  ALT  25     /  AlkPhos  169    04 Oct 2024 16:40    LIVER FUNCTIONS - ( 04 Oct 2024 16:40 )  Alb: 3.9 g/dL / Pro: 8.8 gm/dL / ALK PHOS: 169 U/L / ALT: 25 U/L / AST: 22 U/L / GGT: x           PT/INR - ( 04 Oct 2024 16:40 )   PT: 11.4 sec;   INR: 1.01 ratio      acetaminophen     Tablet .. 650 milliGRAM(s) Oral every 6 hours PRN  aluminum hydroxide/magnesium hydroxide/simethicone Suspension 30 milliLiter(s) Oral every 4 hours PRN  aspirin  chewable 81 milliGRAM(s) Enteral Tube daily  atorvastatin 20 milliGRAM(s) Oral at bedtime  dextrose 5%. 1000 milliLiter(s) IV Continuous <Continuous>  dextrose 5%. 1000 milliLiter(s) IV Continuous <Continuous>  dextrose 50% Injectable 25 Gram(s) IV Push once  dextrose 50% Injectable 25 Gram(s) IV Push once  dextrose 50% Injectable 12.5 Gram(s) IV Push once  dextrose Oral Gel 15 Gram(s) Oral once PRN  ferrous    sulfate Liquid 300 milliGRAM(s) Enteral Tube daily  glucagon  Injectable 1 milliGRAM(s) IntraMuscular once  hydrALAZINE 50 milliGRAM(s) Oral three times a day  insulin glargine Injectable (LANTUS) 10 Unit(s) SubCutaneous at bedtime  insulin lispro (ADMELOG) corrective regimen sliding scale   SubCutaneous three times a day before meals  melatonin 3 milliGRAM(s) Oral at bedtime PRN  Nephro-noam 1 Tablet(s) Oral daily  ondansetron Injectable 4 milliGRAM(s) IV Push every 8 hours PRN  pantoprazole   Suspension 40 milliGRAM(s) Enteral Tube daily  piperacillin/tazobactam IVPB.. 3.375 Gram(s) IV Intermittent every 12 hours  polyethylene glycol 3350 17 Gram(s) Oral daily    Assessment/Plan:    ESRD on HD  Hx CVA, resp failure (s/p trach, now d/c-d), s/p PEG   Hx recurrent asp PNA  Abx  HD today  Fluid removal 2kg  Next HD on Monday    [phone]

## 2024-10-05 NOTE — PROGRESS NOTE ADULT - SUBJECTIVE AND OBJECTIVE BOX
PROGRESS NOTE:     Patient is a 73y old  Male who presents with a chief complaint of Sepsis and acute hypoxic respiratory failure secondary to suspected aspiration PNA vs. HCAP (04 Oct 2024 22:03)      SUBJECTIVE / OVERNIGHT EVENTS:   Patient seen and evaluated at bedside. Patient denies dyspnea at rest, reports cough.   ADDITIONAL REVIEW OF SYSTEMS:    MEDICATIONS  (STANDING):  aspirin  chewable 81 milliGRAM(s) Enteral Tube daily  atorvastatin 20 milliGRAM(s) Oral at bedtime  dextrose 5%. 1000 milliLiter(s) (50 mL/Hr) IV Continuous <Continuous>  dextrose 5%. 1000 milliLiter(s) (100 mL/Hr) IV Continuous <Continuous>  dextrose 50% Injectable 25 Gram(s) IV Push once  dextrose 50% Injectable 12.5 Gram(s) IV Push once  dextrose 50% Injectable 25 Gram(s) IV Push once  ferrous    sulfate Liquid 300 milliGRAM(s) Enteral Tube daily  glucagon  Injectable 1 milliGRAM(s) IntraMuscular once  hydrALAZINE 50 milliGRAM(s) Oral three times a day  insulin glargine Injectable (LANTUS) 10 Unit(s) SubCutaneous at bedtime  insulin lispro (ADMELOG) corrective regimen sliding scale   SubCutaneous three times a day before meals  Nephro-noam 1 Tablet(s) Oral daily  pantoprazole   Suspension 40 milliGRAM(s) Enteral Tube daily  piperacillin/tazobactam IVPB.. 3.375 Gram(s) IV Intermittent every 12 hours  polyethylene glycol 3350 17 Gram(s) Oral daily    MEDICATIONS  (PRN):  acetaminophen     Tablet .. 650 milliGRAM(s) Oral every 6 hours PRN Temp greater or equal to 38C (100.4F), Mild Pain (1 - 3)  aluminum hydroxide/magnesium hydroxide/simethicone Suspension 30 milliLiter(s) Oral every 4 hours PRN Dyspepsia  dextrose Oral Gel 15 Gram(s) Oral once PRN Blood Glucose LESS THAN 70 milliGRAM(s)/deciliter  melatonin 3 milliGRAM(s) Oral at bedtime PRN Insomnia  ondansetron Injectable 4 milliGRAM(s) IV Push every 8 hours PRN Nausea and/or Vomiting      CAPILLARY BLOOD GLUCOSE      POCT Blood Glucose.: 184 mg/dL (05 Oct 2024 12:07)  POCT Blood Glucose.: 156 mg/dL (05 Oct 2024 08:17)  POCT Blood Glucose.: 127 mg/dL (05 Oct 2024 02:41)  POCT Blood Glucose.: 144 mg/dL (04 Oct 2024 20:31)    I&O's Summary    05 Oct 2024 07:01  -  05 Oct 2024 16:15  --------------------------------------------------------  IN: 0 mL / OUT: 2000 mL / NET: -2000 mL        PHYSICAL EXAM:  Vital Signs Last 24 Hrs  T(C): 36.9 (05 Oct 2024 13:07), Max: 38.5 (04 Oct 2024 16:52)  T(F): 98.4 (05 Oct 2024 13:07), Max: 101.3 (04 Oct 2024 16:52)  HR: 76 (05 Oct 2024 13:07) (67 - 118)  BP: 169/71 (05 Oct 2024 13:07) (148/54 - 201/68)  BP(mean): --  RR: 18 (05 Oct 2024 13:07) (17 - 19)  SpO2: 98% (05 Oct 2024 13:07) (91% - 98%)    Parameters below as of 05 Oct 2024 13:07  Patient On (Oxygen Delivery Method): room air        CONSTITUTIONAL: NAD, chronically ill appearing   RESPIRATORY: Normal respiratory effort; frequent coughs; decreased breath sounds   CARDIOVASCULAR: Regular rate and rhythm, normal S1 and S2, no murmur/rub/gallop; No lower extremity edema  ABDOMEN: Nontender to palpation, normoactive bowel sounds, no rebound/guarding. +PEG tube, incision site dry/clean/intact   SKIN: warm to touch   PSYCH: A+O to person, place    LABS:                        7.9    14.32 )-----------( 211      ( 05 Oct 2024 08:37 )             25.0     10-05    127[L]  |  93[L]  |  73[H]  ----------------------------<  150[H]  4.4   |  27  |  4.79[H]    Ca    9.3      05 Oct 2024 08:37  Mg     2.6     10-04    TPro  8.8[H]  /  Alb  3.9  /  TBili  0.9  /  DBili  x   /  AST  22  /  ALT  25  /  AlkPhos  169[H]  10-04    PT/INR - ( 04 Oct 2024 16:40 )   PT: 11.4 sec;   INR: 1.01 ratio         PTT - ( 04 Oct 2024 16:40 )  PTT:31.0 sec      Urinalysis Basic - ( 05 Oct 2024 08:37 )    Color: x / Appearance: x / SG: x / pH: x  Gluc: 150 mg/dL / Ketone: x  / Bili: x / Urobili: x   Blood: x / Protein: x / Nitrite: x   Leuk Esterase: x / RBC: x / WBC x   Sq Epi: x / Non Sq Epi: x / Bacteria: x          RADIOLOGY & ADDITIONAL TESTS:  < from: CT Head No Cont (10.04.24 @ 17:45) >    IMPRESSION: No evidence of acute hemorrhage mass or mass effect    Encephalomalacia and gliosis again seen involving the bifrontal region.    < end of copied text >  < from: CT Chest No Cont (10.04.24 @ 17:32) >  FINDINGS:  Evaluation of the solid organs and vasculature is limited without   intravenous contrast.    CHEST:  LUNGS AND LARGE AIRWAYS: Secretions in the right mainstem and right lower   lobe bronchus. Right lower lobe consolidation and patchy opacities in the   right upper lobe. Left lower lobe calcified granuloma.  PLEURA: No pleural effusion.  VESSELS: Aortic calcifications. Coronary artery calcifications.  HEART: Heart size is normal. No pericardial effusion.  MEDIASTINUM AND LISA: No lymphadenopathy.  CHEST WALL AND LOWER NECK: Within normal limits.    ABDOMEN AND PELVIS:  LIVER: Within normal limits.  BILE DUCTS: Normal caliber.  GALLBLADDER: Within normal limits.  SPLEEN: Within normal limits.  PANCREAS: Within normal limits.  ADRENALS: Within normal limits.  KIDNEYS/URETERS: Atrophic kidneys. No hydronephrosis or calculi.    BLADDER: Within normal limits.  REPRODUCTIVE ORGANS: Prostate is enlarged.    BOWEL: No bowel obstruction. Appendix is normal. PEG tube in place.   Rectum distended with stool.  PERITONEUM/RETROPERITONEUM: Within normal limits.  VESSELS: Atherosclerotic changes.  LYMPH NODES: No lymphadenopathy.  ABDOMINAL WALL: Degenerative changes.  BONES: Degenerative changes.    IMPRESSION:  Pneumonia.    Rectum distended with stool.    < end of copied text >

## 2024-10-05 NOTE — PROGRESS NOTE ADULT - ASSESSMENT
Megha Art is a 73 year old male with PMHx of HTN, IDDM2, ESRD on HD (M/W/F, through RUE AV fistula), hx of CVA x 2 (with residual R. sided weakness and dysphagia s/p PEG tube, previously with tracheostomy and now removed), and hx of RLE DVT (completed apixaban course) who presented to the ED on 10/4/24 for complaints of cough and chills and admitted for sepsis and acute hypoxic respiratory failure secondary to suspected aspiration PNA vs. HCAP.    Sepsis and acute hypoxic respiratory failure secondary to suspected aspiration PNA vs. HCAP  Wife reports patient with increased productive cough with yellow phlegm x 2-3 days  Temp 101.3, , WBC 17.38K on admission  SpO2 as low as 91% on room air, placed on 15L NRB with improvement of SpO2 to 95%, now SpO2 mid 90s on room air  CT chest with secretions in the R. mainstem and RLL bronchus, RLL consolidation and patchy opacities in the RUL  S/p  cc bolus, vancomycin 1 g IV, zosyn 3.375 g IV, and acetaminophen 1 g IV in the ED  Zosyn 3.375 g IV continued for now for empiric HCAP coverage given recent hospitalization  F/u procal 22.5, MRSA/MSSA PCR, random vanco trough for vanco dosing given on HD  F/u urine Legionella, urine Strep, Mycoplasma IgM  F/u blood cultures  Monitor fever curve and WBC trend  Telemetry for continuous pulse ox    Hypertensive urgency  BP as elevated as 201/68 on admission  S/p hydralazine 10 mg IV in the ED  PTA hydralazine 50 mg TID, nifedipine ER 90 mg held given ER cannot be crushed, discussed with pharmacist  F/u TSH 1.66, lipid panel LDL 17, A1c 4.8 for risk stratification  Goal is to reduce MAP by no more than 25-30% in first 2-4 hours, short term goal < 160 / < 100  Telemetry    Hyponatremia   -Na 130--> 129--> 127  -nephrology following plan for HD today     Constipation  CT A/P with rectum distended with stool  PTA Miralax 17 g daily  Consider addition of senna and docusate if no BM    Tube feeds  Patient has been NPO with tube feeds since CVA as per wife  Tube feeding modality is bolus of Nepro with carb steady 237 mL q4 administered through syringe  Nutrition consulted    Lactic acidosis, resolved  Lactic acid 3.6 on admission--> lactate 1.2  S/p  cc bolus in the ED      Chronic medical conditions:  IDDM2: last known A1c 5.9 (on 7/16/24), POC qac and qhs, PTA Lantus 10 U qhs, PTA Novolog SSI reordered as Humalog SSI, blood glucose goal < 180, f/u A1c  ESRD on HD (M/W/F, through RUE AV fistula): PTA nephrovite, did not attend HD session today since he was in the ER, plan for HD in AM, nephrology following  Hx of CVA x 2 (with residual R. sided weakness and dysphagia s/p PEG tube, previously with tracheostomy and now removed): PTA ASA 81 mg, atorvastatin 20 mg  Hx of RLE DVT (which was possibly provoked secondary to COVID19 infection?): completed apixaban course  Microcytic anemia, likely with component of anemia of ESRD: hgb 9.9 on admission, appears better than baseline, no signs of active bleeding    Medication reconciliation completed using med list provided by wife at bedside.    Plan of care discussed with wife at bedside.

## 2024-10-06 LAB
GLUCOSE BLDC GLUCOMTR-MCNC: 156 MG/DL — HIGH (ref 70–99)
GLUCOSE BLDC GLUCOMTR-MCNC: 170 MG/DL — HIGH (ref 70–99)
GLUCOSE BLDC GLUCOMTR-MCNC: 171 MG/DL — HIGH (ref 70–99)
GLUCOSE BLDC GLUCOMTR-MCNC: 181 MG/DL — HIGH (ref 70–99)
MRSA PCR RESULT.: SIGNIFICANT CHANGE UP
S AUREUS DNA NOSE QL NAA+PROBE: SIGNIFICANT CHANGE UP

## 2024-10-06 PROCEDURE — 99233 SBSQ HOSP IP/OBS HIGH 50: CPT

## 2024-10-06 RX ORDER — HYDRALAZINE HYDROCHLORIDE 50 MG/1
100 TABLET, FILM COATED ORAL THREE TIMES A DAY
Refills: 0 | Status: DISCONTINUED | OUTPATIENT
Start: 2024-10-06 | End: 2024-10-08

## 2024-10-06 RX ADMIN — Medication 10 UNIT(S): at 22:09

## 2024-10-06 RX ADMIN — HYDRALAZINE HYDROCHLORIDE 50 MILLIGRAM(S): 50 TABLET, FILM COATED ORAL at 06:02

## 2024-10-06 RX ADMIN — Medication 1: at 12:06

## 2024-10-06 RX ADMIN — POLYETHYLENE GLYCOL 3350 17 GRAM(S): 17 POWDER, FOR SOLUTION ORAL at 12:43

## 2024-10-06 RX ADMIN — Medication 1 TABLET(S): at 12:43

## 2024-10-06 RX ADMIN — Medication 1: at 17:18

## 2024-10-06 RX ADMIN — HYDRALAZINE HYDROCHLORIDE 100 MILLIGRAM(S): 50 TABLET, FILM COATED ORAL at 22:11

## 2024-10-06 RX ADMIN — Medication 20 MILLIGRAM(S): at 22:11

## 2024-10-06 RX ADMIN — PIPERACILLIN AND TAZOBACTAM 25 GRAM(S): .5; 4 INJECTION, POWDER, LYOPHILIZED, FOR SOLUTION INTRAVENOUS at 14:20

## 2024-10-06 RX ADMIN — Medication 1: at 08:35

## 2024-10-06 RX ADMIN — PIPERACILLIN AND TAZOBACTAM 25 GRAM(S): .5; 4 INJECTION, POWDER, LYOPHILIZED, FOR SOLUTION INTRAVENOUS at 03:41

## 2024-10-06 RX ADMIN — HYDRALAZINE HYDROCHLORIDE 100 MILLIGRAM(S): 50 TABLET, FILM COATED ORAL at 13:53

## 2024-10-06 RX ADMIN — Medication 300 MILLIGRAM(S): at 12:43

## 2024-10-06 RX ADMIN — PANTOPRAZOLE SODIUM 40 MILLIGRAM(S): 40 TABLET, DELAYED RELEASE ORAL at 12:43

## 2024-10-06 RX ADMIN — Medication 81 MILLIGRAM(S): at 12:43

## 2024-10-06 NOTE — DIETITIAN INITIAL EVALUATION ADULT - ETIOLOGY
Inadequate energy/protein intake + increased needs related to ESRD on HD, CVA x 2, hx of dysphagia, recurrent aspiration PNA

## 2024-10-06 NOTE — PROGRESS NOTE ADULT - SUBJECTIVE AND OBJECTIVE BOX
Patient is a 73y old  Male who presents with a chief complaint of PNEUMONIA;ESRD ON DIALYSIS     (06 Oct 2024 11:45)      INTERVAL HPI/OVERNIGHT EVENTS: overnight no acute events.     MEDICATIONS  (STANDING):  aspirin  chewable 81 milliGRAM(s) Enteral Tube daily  atorvastatin 20 milliGRAM(s) Oral at bedtime  dextrose 5%. 1000 milliLiter(s) (50 mL/Hr) IV Continuous <Continuous>  dextrose 5%. 1000 milliLiter(s) (100 mL/Hr) IV Continuous <Continuous>  dextrose 50% Injectable 12.5 Gram(s) IV Push once  dextrose 50% Injectable 25 Gram(s) IV Push once  dextrose 50% Injectable 25 Gram(s) IV Push once  ferrous    sulfate Liquid 300 milliGRAM(s) Enteral Tube daily  glucagon  Injectable 1 milliGRAM(s) IntraMuscular once  hydrALAZINE 50 milliGRAM(s) Oral three times a day  insulin glargine Injectable (LANTUS) 10 Unit(s) SubCutaneous at bedtime  insulin lispro (ADMELOG) corrective regimen sliding scale   SubCutaneous three times a day before meals  Nephro-noam 1 Tablet(s) Oral daily  pantoprazole   Suspension 40 milliGRAM(s) Enteral Tube daily  piperacillin/tazobactam IVPB.. 3.375 Gram(s) IV Intermittent every 12 hours  polyethylene glycol 3350 17 Gram(s) Oral daily    MEDICATIONS  (PRN):  acetaminophen     Tablet .. 650 milliGRAM(s) Oral every 6 hours PRN Temp greater or equal to 38C (100.4F), Mild Pain (1 - 3)  aluminum hydroxide/magnesium hydroxide/simethicone Suspension 30 milliLiter(s) Oral every 4 hours PRN Dyspepsia  dextrose Oral Gel 15 Gram(s) Oral once PRN Blood Glucose LESS THAN 70 milliGRAM(s)/deciliter  melatonin 3 milliGRAM(s) Oral at bedtime PRN Insomnia  ondansetron Injectable 4 milliGRAM(s) IV Push every 8 hours PRN Nausea and/or Vomiting      Allergies    No Known Allergies    Intolerances        REVIEW OF SYSTEMS:  ROS negative unless stated otherwise.    Vital Signs Last 24 Hrs  T(C): 36.7 (06 Oct 2024 10:47), Max: 37.3 (05 Oct 2024 23:06)  T(F): 98 (06 Oct 2024 10:47), Max: 99.2 (05 Oct 2024 23:06)  HR: 82 (06 Oct 2024 10:47) (60 - 90)  BP: 168/66 (06 Oct 2024 10:47) (122/71 - 168/66)  BP(mean): 103 (05 Oct 2024 17:06) (103 - 103)  RR: 18 (06 Oct 2024 10:47) (17 - 19)  SpO2: 95% (06 Oct 2024 10:47) (95% - 97%)    Parameters below as of 05 Oct 2024 17:06  Patient On (Oxygen Delivery Method): room air        PHYSICAL EXAM:  CONSTITUTIONAL: NAD, chronically ill appearing   RESPIRATORY: Normal respiratory effort; frequent coughs; decreased breath sounds   CARDIOVASCULAR: Regular rate and rhythm, normal S1 and S2, no murmur/rub/gallop; No lower extremity edema  ABDOMEN: Nontender to palpation, normoactive bowel sounds, no rebound/guarding. +PEG tube, incision site dry/clean/intact   SKIN: warm to touch   PSYCH: A+O to person, place    LABS:                        7.9    14.32 )-----------( 211      ( 05 Oct 2024 08:37 )             25.0     10-05    127[L]  |  93[L]  |  73[H]  ----------------------------<  150[H]  4.4   |  27  |  4.79[H]    Ca    9.3      05 Oct 2024 08:37  Mg     2.6     10-04    TPro  8.8[H]  /  Alb  3.9  /  TBili  0.9  /  DBili  x   /  AST  22  /  ALT  25  /  AlkPhos  169[H]  10-04    PT/INR - ( 04 Oct 2024 16:40 )   PT: 11.4 sec;   INR: 1.01 ratio         PTT - ( 04 Oct 2024 16:40 )  PTT:31.0 sec  Urinalysis Basic - ( 05 Oct 2024 08:37 )    Color: x / Appearance: x / SG: x / pH: x  Gluc: 150 mg/dL / Ketone: x  / Bili: x / Urobili: x   Blood: x / Protein: x / Nitrite: x   Leuk Esterase: x / RBC: x / WBC x   Sq Epi: x / Non Sq Epi: x / Bacteria: x      CAPILLARY BLOOD GLUCOSE      POCT Blood Glucose.: 181 mg/dL (06 Oct 2024 11:23)  POCT Blood Glucose.: 171 mg/dL (06 Oct 2024 08:13)  POCT Blood Glucose.: 160 mg/dL (05 Oct 2024 21:44)  POCT Blood Glucose.: 162 mg/dL (05 Oct 2024 17:09)      RADIOLOGY & ADDITIONAL TESTS:    Imaging Personally Reviewed:  [ X] YES  [ ] NO    Consultant(s) Notes Reviewed:  [ X] YES  [ ] NO    Care Discussed with Consultants/Other Providers [X ] YES  [ ] NO

## 2024-10-06 NOTE — DIETITIAN INITIAL EVALUATION ADULT - ENTERAL
Bolus tube feeding via PEG of Nepro x 4/day (1 can; 237 ml) - provides 948 ml volume, 1706 kcal, 76 g protein, 688 ml H2O) + LPS x 1/day (provides 15 g protein, 200 kcal)

## 2024-10-06 NOTE — DIETITIAN INITIAL EVALUATION ADULT - NSICDXPASTMEDICALHX_GEN_ALL_CORE_FT
PAST MEDICAL HISTORY:  ESRD on hemodialysis     History of cerebrovascular accident (CVA) with residual deficit     History of DVT of lower extremity     HTN (hypertension)     Insulin dependent type 2 diabetes mellitus

## 2024-10-06 NOTE — DIETITIAN INITIAL EVALUATION ADULT - NS FNS DIET ORDER
Diet, NPO with Tube Feed:   Tube Feeding Modality: Gastrostomy  Nepro with Carb Steady  Total Volume for 24 Hours (mL): 1422  Bolus  Total Volume of Bolus (mL):  237  Tube Feed Frequency: Every 4 hours   Tube Feed Start Time: 20:30  Bolus Feed Rate (mL per Hour): 0   Bolus Feed Duration (in Hours): 0 (10-04-24 @ 20:25)

## 2024-10-06 NOTE — DIETITIAN INITIAL EVALUATION ADULT - OTHER INFO
Pt c complicated PMHx which includes ESRD on HD lives c wife; who is his caregiver; pt is wheelchair bound; dependent for all ADLs. Pt c multiple admissions; PEG placed 5/2024; wt at that time as noted. Pt takes Lantus at bedtime + Novolog on sliding scale at meals. Pt admitted now c cough, chills; found c sepsis & acute respiratory failure 2/2 suspected aspiration PNA vs HCAP; also c HTN urgency at admission. No reports of any N/V/D; pt c constipation; no bowel regimen.  H&P indicates pt on bolus TF at home q4 (4 x/day or every 4 hrs?); presently pt receiving 1 can of Nepro q 4 hours (provides 1422 ml volume, 2520 kcal, 114 g protein, 1032 ml H2O); per calculations & considering pt hx of recurrent aspiration PNA, will change feeding to 4x/day (provides 948 ml volume, 1706 kcal, 76 g protein, 688 ml H2O). Also, pt c low Na levels so instructed RN no H2O flushes until Na WDL; instruction reflected in new diet rx.

## 2024-10-06 NOTE — PROGRESS NOTE ADULT - ASSESSMENT
Megha Art is a 73 year old male with PMHx of HTN, IDDM2, ESRD on HD (M/W/F, through RUE AV fistula), hx of CVA x 2 (with residual R. sided weakness and dysphagia s/p PEG tube, previously with tracheostomy and now removed), and hx of RLE DVT (completed apixaban course) who presented to the ED on 10/4/24 for complaints of cough and chills and admitted for sepsis and acute hypoxic respiratory failure secondary to suspected aspiration PNA vs. HCAP.    Sepsis and acute hypoxic respiratory failure secondary to suspected aspiration PNA vs. HCAP  Wife reports patient with increased productive cough with yellow phlegm x 2-3 days  Temp 101.3, , WBC 17.38K on admission  SpO2 as low as 91% on room air, placed on 15L NRB with improvement of SpO2 to 95%, now SpO2 mid 90s on room air  CT chest with secretions in the R. mainstem and RLL bronchus, RLL consolidation and patchy opacities in the RUL  S/p  cc bolus, vancomycin 1 g IV, zosyn 3.375 g IV, and acetaminophen 1 g IV in the ED  Zosyn 3.375 g IV continued for now for empiric HCAP coverage given recent hospitalization  F/u procal 22.5, MRSA/MSSA PCR, random vanco trough for vanco dosing given on HD  F/u urine Legionella, urine Strep, Mycoplasma IgM  F/u blood cultures  Monitor fever curve and WBC trend  Telemetry for continuous pulse ox    Hypertensive urgency  BP as elevated as 201/68 on admission  S/p hydralazine 10 mg IV in the ED  PTA hydralazine 50 mg TID--- increased   nifedipine ER 90 mg held given ER cannot be crushed, discussed with pharmacist  F/u TSH 1.66, lipid panel LDL 17, A1c 4.8 for risk stratification0  Telemetry    Hyponatremia   -Na 130--> 129--> 127  -nephrology following  for HD     Constipation  CT A/P with rectum distended with stool  PTA Miralax 17 g daily  Consider addition of senna and docusate if no BM    Tube feeds  Patient has been NPO with tube feeds since CVA as per wife  Tube feeding modality is bolus of Nepro with carb steady 237 mL q4 administered through syringe  Nutrition consulted    Lactic acidosis, resolved  Lactic acid 3.6 on admission--> lactate 1.2  S/p  cc bolus in the ED      Chronic medical conditions:  IDDM2: last known A1c 5.9 (on 7/16/24), POC qac and qhs, PTA Lantus 10 U qhs, PTA Novolog SSI reordered as Humalog SSI, blood glucose goal < 180, f/u A1c  ESRD on HD (M/W/F, through RUE AV fistula): PTA nephrovite, did not attend HD session today since he was in the ER, plan for HD in AM, nephrology following  Hx of CVA x 2 (with residual R. sided weakness and dysphagia s/p PEG tube, previously with tracheostomy and now removed): PTA ASA 81 mg, atorvastatin 20 mg  Hx of RLE DVT (which was possibly provoked secondary to COVID19 infection?): completed apixaban course  Microcytic anemia, likely with component of anemia of ESRD: hgb 9.9 on admission, appears better than baseline, no signs of active bleeding    Medication reconciliation completed using med list provided by wife at bedside.

## 2024-10-06 NOTE — DIETITIAN INITIAL EVALUATION ADULT - REASON INDICATOR FOR ASSESSMENT
Pt seen for consult for MST (wt loss, poor PO intake); also high risk screen for BMI <19, tube feeding

## 2024-10-06 NOTE — DIETITIAN INITIAL EVALUATION ADULT - PERTINENT LABORATORY DATA
10-05    127[L]  |  93[L]  |  73[H]  ----------------------------<  150[H]  4.4   |  27  |  4.79[H]    Ca    9.3      05 Oct 2024 08:37  Mg     2.6     10-04    TPro  8.8[H]  /  Alb  3.9  /  TBili  0.9  /  DBili  x   /  AST  22  /  ALT  25  /  AlkPhos  169[H]  10-04  POCT Blood Glucose.: 181 mg/dL (10-06-24 @ 11:23)  A1C with Estimated Average Glucose Result: 4.8 %, 91 (10-05-24)  A1C Result: 5.9 % (07-16-24)  A1C Result: 6.9 % (04-30-24)

## 2024-10-06 NOTE — DIETITIAN INITIAL EVALUATION ADULT - PERTINENT MEDS FT
Zosyn, Lantus, Admelog sliding scale, Maalox, Hydralazine, Melatonin, Nephro-Kavon, Zofran, Protonix, Miralax

## 2024-10-06 NOTE — DIETITIAN INITIAL EVALUATION ADULT - FACTORS AFF FOOD INTAKE
Initial (On Arrival) hx of dysphagia s/p CVAs; recurrent aspiration PNA/difficulty chewing/difficulty feeding self/difficulty swallowing/other (specify)

## 2024-10-07 DIAGNOSIS — R09.02 HYPOXEMIA: ICD-10-CM

## 2024-10-07 LAB
ANION GAP SERPL CALC-SCNC: 15 MMOL/L — SIGNIFICANT CHANGE UP (ref 5–17)
BUN SERPL-MCNC: 74 MG/DL — HIGH (ref 7–23)
CALCIUM SERPL-MCNC: 10.1 MG/DL — SIGNIFICANT CHANGE UP (ref 8.5–10.1)
CHLORIDE SERPL-SCNC: 91 MMOL/L — LOW (ref 96–108)
CO2 SERPL-SCNC: 25 MMOL/L — SIGNIFICANT CHANGE UP (ref 22–31)
CREAT SERPL-MCNC: 5.06 MG/DL — HIGH (ref 0.5–1.3)
EGFR: 12 ML/MIN/1.73M2 — LOW
GAS PNL BLDA: SIGNIFICANT CHANGE UP
GLUCOSE BLDC GLUCOMTR-MCNC: 221 MG/DL — HIGH (ref 70–99)
GLUCOSE BLDC GLUCOMTR-MCNC: 221 MG/DL — HIGH (ref 70–99)
GLUCOSE BLDC GLUCOMTR-MCNC: 228 MG/DL — HIGH (ref 70–99)
GLUCOSE BLDC GLUCOMTR-MCNC: 270 MG/DL — HIGH (ref 70–99)
GLUCOSE BLDC GLUCOMTR-MCNC: 291 MG/DL — HIGH (ref 70–99)
GLUCOSE BLDC GLUCOMTR-MCNC: 380 MG/DL — HIGH (ref 70–99)
GLUCOSE SERPL-MCNC: 197 MG/DL — HIGH (ref 70–99)
HCT VFR BLD CALC: 31.4 % — LOW (ref 39–50)
HGB BLD-MCNC: 10.1 G/DL — LOW (ref 13–17)
LEGIONELLA AG UR QL: NEGATIVE — SIGNIFICANT CHANGE UP
MCHC RBC-ENTMCNC: 21.7 PG — LOW (ref 27–34)
MCHC RBC-ENTMCNC: 32.2 G/DL — SIGNIFICANT CHANGE UP (ref 32–36)
MCV RBC AUTO: 67.4 FL — LOW (ref 80–100)
NRBC # BLD: 0 /100 WBCS — SIGNIFICANT CHANGE UP (ref 0–0)
PLATELET # BLD AUTO: 339 K/UL — SIGNIFICANT CHANGE UP (ref 150–400)
POTASSIUM SERPL-MCNC: 3.7 MMOL/L — SIGNIFICANT CHANGE UP (ref 3.5–5.3)
POTASSIUM SERPL-SCNC: 3.7 MMOL/L — SIGNIFICANT CHANGE UP (ref 3.5–5.3)
RBC # BLD: 4.66 M/UL — SIGNIFICANT CHANGE UP (ref 4.2–5.8)
RBC # FLD: 18.9 % — HIGH (ref 10.3–14.5)
S PNEUM AG UR QL: NEGATIVE — SIGNIFICANT CHANGE UP
SODIUM SERPL-SCNC: 131 MMOL/L — LOW (ref 135–145)
WBC # BLD: 19.76 K/UL — HIGH (ref 3.8–10.5)
WBC # FLD AUTO: 19.76 K/UL — HIGH (ref 3.8–10.5)

## 2024-10-07 PROCEDURE — 99233 SBSQ HOSP IP/OBS HIGH 50: CPT

## 2024-10-07 PROCEDURE — 71275 CT ANGIOGRAPHY CHEST: CPT | Mod: 26

## 2024-10-07 PROCEDURE — 99291 CRITICAL CARE FIRST HOUR: CPT

## 2024-10-07 RX ORDER — AMLODIPINE BESYLATE 10 MG
10 TABLET ORAL DAILY
Refills: 0 | Status: DISCONTINUED | OUTPATIENT
Start: 2024-10-07 | End: 2024-10-08

## 2024-10-07 RX ORDER — CHLORHEXIDINE GLUCONATE 40 MG/ML
1 SOLUTION TOPICAL DAILY
Refills: 0 | Status: DISCONTINUED | OUTPATIENT
Start: 2024-10-07 | End: 2024-10-09

## 2024-10-07 RX ORDER — IPRATROPIUM BROMIDE AND ALBUTEROL SULFATE .5; 2.5 MG/3ML; MG/3ML
3 SOLUTION RESPIRATORY (INHALATION) EVERY 6 HOURS
Refills: 0 | Status: DISCONTINUED | OUTPATIENT
Start: 2024-10-07 | End: 2024-10-07

## 2024-10-07 RX ORDER — HYDRALAZINE HYDROCHLORIDE 50 MG/1
10 TABLET, FILM COATED ORAL ONCE
Refills: 0 | Status: COMPLETED | OUTPATIENT
Start: 2024-10-07 | End: 2024-10-07

## 2024-10-07 RX ORDER — IPRATROPIUM BROMIDE AND ALBUTEROL SULFATE .5; 2.5 MG/3ML; MG/3ML
3 SOLUTION RESPIRATORY (INHALATION) EVERY 6 HOURS
Refills: 0 | Status: DISCONTINUED | OUTPATIENT
Start: 2024-10-07 | End: 2024-10-09

## 2024-10-07 RX ORDER — SENNA 187 MG
2 TABLET ORAL AT BEDTIME
Refills: 0 | Status: DISCONTINUED | OUTPATIENT
Start: 2024-10-07 | End: 2024-10-08

## 2024-10-07 RX ADMIN — PANTOPRAZOLE SODIUM 40 MILLIGRAM(S): 40 TABLET, DELAYED RELEASE ORAL at 11:47

## 2024-10-07 RX ADMIN — Medication 2: at 11:13

## 2024-10-07 RX ADMIN — Medication 3: at 07:53

## 2024-10-07 RX ADMIN — PIPERACILLIN AND TAZOBACTAM 25 GRAM(S): .5; 4 INJECTION, POWDER, LYOPHILIZED, FOR SOLUTION INTRAVENOUS at 14:15

## 2024-10-07 RX ADMIN — Medication 10 UNIT(S): at 23:09

## 2024-10-07 RX ADMIN — Medication 81 MILLIGRAM(S): at 11:46

## 2024-10-07 RX ADMIN — Medication 10 MILLIGRAM(S): at 11:47

## 2024-10-07 RX ADMIN — PIPERACILLIN AND TAZOBACTAM 25 GRAM(S): .5; 4 INJECTION, POWDER, LYOPHILIZED, FOR SOLUTION INTRAVENOUS at 03:45

## 2024-10-07 RX ADMIN — IPRATROPIUM BROMIDE AND ALBUTEROL SULFATE 3 MILLILITER(S): .5; 2.5 SOLUTION RESPIRATORY (INHALATION) at 11:35

## 2024-10-07 RX ADMIN — Medication 2: at 16:57

## 2024-10-07 RX ADMIN — HYDRALAZINE HYDROCHLORIDE 100 MILLIGRAM(S): 50 TABLET, FILM COATED ORAL at 13:43

## 2024-10-07 RX ADMIN — HYDRALAZINE HYDROCHLORIDE 100 MILLIGRAM(S): 50 TABLET, FILM COATED ORAL at 23:04

## 2024-10-07 RX ADMIN — POLYETHYLENE GLYCOL 3350 17 GRAM(S): 17 POWDER, FOR SOLUTION ORAL at 11:47

## 2024-10-07 RX ADMIN — HYDRALAZINE HYDROCHLORIDE 100 MILLIGRAM(S): 50 TABLET, FILM COATED ORAL at 05:49

## 2024-10-07 RX ADMIN — Medication 20 MILLIGRAM(S): at 23:04

## 2024-10-07 RX ADMIN — Medication 300 MILLIGRAM(S): at 11:46

## 2024-10-07 RX ADMIN — Medication 1 TABLET(S): at 11:47

## 2024-10-07 RX ADMIN — Medication 650 MILLIGRAM(S): at 18:52

## 2024-10-07 RX ADMIN — CHLORHEXIDINE GLUCONATE 1 APPLICATION(S): 40 SOLUTION TOPICAL at 12:13

## 2024-10-07 RX ADMIN — HYDRALAZINE HYDROCHLORIDE 10 MILLIGRAM(S): 50 TABLET, FILM COATED ORAL at 18:31

## 2024-10-07 NOTE — RAPID RESPONSE TEAM SUMMARY - NSOTHERINTERVENTIONSRRT_GEN_ALL_CORE
Chest PT  ABG - ( 07 Oct 2024 18:16 )  pH, Arterial: 7.48  pH, Blood: x     /  pCO2: 38    /  pO2: 52    / HCO3: 28    / Base Excess: 4.5   /  SaO2: 89.0    100% NRB, titrated to 6L NC, now satting low to mid 90s  CTA chest, ok with nephrology Dr. Moya. Pt for HD tomorrow  rectal temp 100.2  labs- cbc, bmp, procalcitonin  stool cultures  Discussed with Dr. Kang via teams    Time spent 70 min

## 2024-10-07 NOTE — PROGRESS NOTE ADULT - SUBJECTIVE AND OBJECTIVE BOX
Patient is a 73y old  Male who presents with a chief complaint of Sepsis and acute hypoxic respiratory failure secondary to suspected aspiration PNA vs. HCAP (06 Oct 2024 13:11)      INTERVAL HPI/OVERNIGHT EVENTS: Overnight no a cute events. No complaints.    MEDICATIONS  (STANDING):  amLODIPine   Tablet 10 milliGRAM(s) Oral daily  aspirin  chewable 81 milliGRAM(s) Enteral Tube daily  atorvastatin 20 milliGRAM(s) Oral at bedtime  chlorhexidine 2% Cloths 1 Application(s) Topical daily  dextrose 5%. 1000 milliLiter(s) (100 mL/Hr) IV Continuous <Continuous>  dextrose 5%. 1000 milliLiter(s) (50 mL/Hr) IV Continuous <Continuous>  dextrose 50% Injectable 25 Gram(s) IV Push once  dextrose 50% Injectable 25 Gram(s) IV Push once  dextrose 50% Injectable 12.5 Gram(s) IV Push once  ferrous    sulfate Liquid 300 milliGRAM(s) Enteral Tube daily  glucagon  Injectable 1 milliGRAM(s) IntraMuscular once  hydrALAZINE 100 milliGRAM(s) Oral three times a day  insulin glargine Injectable (LANTUS) 10 Unit(s) SubCutaneous at bedtime  insulin lispro (ADMELOG) corrective regimen sliding scale   SubCutaneous three times a day before meals  Nephro-noam 1 Tablet(s) Oral daily  pantoprazole   Suspension 40 milliGRAM(s) Enteral Tube daily  piperacillin/tazobactam IVPB.. 3.375 Gram(s) IV Intermittent every 12 hours  polyethylene glycol 3350 17 Gram(s) Oral daily  senna 2 Tablet(s) Oral at bedtime    MEDICATIONS  (PRN):  acetaminophen     Tablet .. 650 milliGRAM(s) Oral every 6 hours PRN Temp greater or equal to 38C (100.4F), Mild Pain (1 - 3)  albuterol/ipratropium for Nebulization 3 milliLiter(s) Nebulizer every 6 hours PRN Shortness of Breath and/or Wheezing  aluminum hydroxide/magnesium hydroxide/simethicone Suspension 30 milliLiter(s) Oral every 4 hours PRN Dyspepsia  dextrose Oral Gel 15 Gram(s) Oral once PRN Blood Glucose LESS THAN 70 milliGRAM(s)/deciliter  melatonin 3 milliGRAM(s) Oral at bedtime PRN Insomnia  ondansetron Injectable 4 milliGRAM(s) IV Push every 8 hours PRN Nausea and/or Vomiting      Allergies    No Known Allergies    Intolerances        REVIEW OF SYSTEMS:  ROS negative unless stated otherwise.    Vital Signs Last 24 Hrs  T(C): 37.2 (07 Oct 2024 10:27), Max: 37.6 (06 Oct 2024 23:40)  T(F): 99 (07 Oct 2024 10:27), Max: 99.7 (06 Oct 2024 23:40)  HR: 98 (07 Oct 2024 13:43) (85 - 98)  BP: 172/71 (07 Oct 2024 13:43) (145/69 - 188/70)  BP(mean): --  RR: 18 (07 Oct 2024 10:27) (17 - 18)  SpO2: 94% (07 Oct 2024 13:14) (93% - 95%)    Parameters below as of 07 Oct 2024 13:14  Patient On (Oxygen Delivery Method): room air        PHYSICAL EXAM:  CONSTITUTIONAL: NAD, chronically ill appearing   RESPIRATORY: Normal respiratory effort; frequent coughs; decreased breath sounds   CARDIOVASCULAR: Regular rate and rhythm, normal S1 and S2, no murmur/rub/gallop; No lower extremity edema  ABDOMEN: Nontender to palpation, normoactive bowel sounds, no rebound/guarding. +PEG tube, incision site dry/clean/intact   SKIN: warm to touch   PSYCH: A+O to person, place    LABS:              CAPILLARY BLOOD GLUCOSE      POCT Blood Glucose.: 228 mg/dL (07 Oct 2024 11:00)  POCT Blood Glucose.: 270 mg/dL (07 Oct 2024 07:32)  POCT Blood Glucose.: 156 mg/dL (06 Oct 2024 22:04)  POCT Blood Glucose.: 170 mg/dL (06 Oct 2024 16:24)      RADIOLOGY & ADDITIONAL TESTS:    Imaging Personally Reviewed:  [ X] YES  [ ] NO    Consultant(s) Notes Reviewed:  [ X] YES  [ ] NO    Care Discussed with Consultants/Other Providers [X ] YES  [ ] NO

## 2024-10-07 NOTE — PROGRESS NOTE ADULT - SUBJECTIVE AND OBJECTIVE BOX
[facility] NEPHROLOGY SERVICES, [facility]  NEPHROLOGY AND HYPERTENSION  [address]  [address]  [place] [address]  [phone]    MD RONNA CHANG MD YELENA ROSENBERG, MD BINNY KOSHY, MD CHRISTOPHER CAPUTO, MD EDWARD BOVER, MD          Patient events noted  No distress    MEDICATIONS  (STANDING):  amLODIPine   Tablet 10 milliGRAM(s) Oral daily  aspirin  chewable 81 milliGRAM(s) Enteral Tube daily  atorvastatin 20 milliGRAM(s) Oral at bedtime  chlorhexidine 2% Cloths 1 Application(s) Topical daily  dextrose 5%. 1000 milliLiter(s) (100 mL/Hr) IV Continuous <Continuous>  dextrose 5%. 1000 milliLiter(s) (50 mL/Hr) IV Continuous <Continuous>  dextrose 50% Injectable 25 Gram(s) IV Push once  dextrose 50% Injectable 12.5 Gram(s) IV Push once  dextrose 50% Injectable 25 Gram(s) IV Push once  ferrous    sulfate Liquid 300 milliGRAM(s) Enteral Tube daily  glucagon  Injectable 1 milliGRAM(s) IntraMuscular once  hydrALAZINE 100 milliGRAM(s) Oral three times a day  insulin glargine Injectable (LANTUS) 10 Unit(s) SubCutaneous at bedtime  insulin lispro (ADMELOG) corrective regimen sliding scale   SubCutaneous three times a day before meals  Nephro-noam 1 Tablet(s) Oral daily  pantoprazole   Suspension 40 milliGRAM(s) Enteral Tube daily  piperacillin/tazobactam IVPB.. 3.375 Gram(s) IV Intermittent every 12 hours  polyethylene glycol 3350 17 Gram(s) Oral daily  senna 2 Tablet(s) Oral at bedtime    MEDICATIONS  (PRN):  acetaminophen     Tablet .. 650 milliGRAM(s) Oral every 6 hours PRN Temp greater or equal to 38C (100.4F), Mild Pain (1 - 3)  albuterol/ipratropium for Nebulization 3 milliLiter(s) Nebulizer every 6 hours PRN Shortness of Breath and/or Wheezing  aluminum hydroxide/magnesium hydroxide/simethicone Suspension 30 milliLiter(s) Oral every 4 hours PRN Dyspepsia  dextrose Oral Gel 15 Gram(s) Oral once PRN Blood Glucose LESS THAN 70 milliGRAM(s)/deciliter  melatonin 3 milliGRAM(s) Oral at bedtime PRN Insomnia  ondansetron Injectable 4 milliGRAM(s) IV Push every 8 hours PRN Nausea and/or Vomiting      10-06-24 @ 07:01  -  10-07-24 @ 07:00  --------------------------------------------------------  IN: 474 mL / OUT: 0 mL / NET: 474 mL      PHYSICAL EXAM:      T(C): 37.3 (10-07-24 @ 16:03), Max: 37.6 (10-06-24 @ 23:40)  HR: 127 (10-07-24 @ 16:28) (88 - 127)  BP: 153/72 (10-07-24 @ 16:03) (145/69 - 188/70)  RR: 19 (10-07-24 @ 16:03) (18 - 19)  SpO2: 97% (10-07-24 @ 16:03) (93% - 97%)  Wt(kg): --  Lungs clear  Heart S1S2  Abd soft NT ND  Extremities:   tr edema                          Creatinine Trend: 4.79<--, 4.05<--      Assessment/Plan:    ESRD on HD  Hx CVA, resp failure (s/p trach, now d/c-d), s/p PEG   Hx recurrent asp PNA  Abx  HD tomorrow         Delonte Moya MD

## 2024-10-07 NOTE — PROGRESS NOTE ADULT - ASSESSMENT
Megha Art is a 73 year old male with PMHx of HTN, IDDM2, ESRD on HD (M/W/F, through RUE AV fistula), hx of CVA x 2 (with residual R. sided weakness and dysphagia s/p PEG tube, previously with tracheostomy and now removed), and hx of RLE DVT (completed apixaban course) who presented to the ED on 10/4/24 for complaints of cough and chills and admitted for sepsis and acute hypoxic respiratory failure secondary to suspected aspiration PNA vs. HCAP.    Sepsis and acute hypoxic respiratory failure secondary to suspected aspiration PNA vs. HCAP  Wife reports patient with increased productive cough with yellow phlegm x 2-3 days  Temp 101.3, , WBC 17.38K on admission  SpO2 as low as 91% on room air, placed on 15L NRB with improvement of SpO2 to 95%, now SpO2 mid 90s on room air  CT chest with secretions in the R. mainstem and RLL bronchus, RLL consolidation and patchy opacities in the RUL  S/p  cc bolus, vancomycin 1 g IV, zosyn 3.375 g IV, and acetaminophen 1 g IV in the ED  Zosyn 3.375 g IV continued for now for empiric HCAP coverage given recent hospitalization  Procal elevated 22.5, MRSA/MSSA PCR neg  F/u urine Legionella, urine Strep, Mycoplasma IgM  10/4 Blood cultures NGTD  Monitor fever curve and WBC trend  Telemetry for continuous pulse ox    Hypertensive urgency  BP as elevated as 201/68 on admission  S/p hydralazine 10 mg IV in the ED  PTA hydralazine 50 mg TID--- increased   nifedipine ER 90 mg held given ER cannot be crushed, discussed with pharmacist  F/u TSH 1.66, lipid panel LDL 17, A1c 4.8 for risk stratification0  Telemetry    Hyponatremia   -Na 130--> 129--> 127  -nephrology following  for HD   - pending repeat BMP    Constipation  CT A/P with rectum distended with stool  PTA Miralax 17 g daily  Senna added     Tube feeds  Patient has been NPO with tube feeds since CVA as per wife  Tube feeding modality is bolus of Nepro with carb steady 237 mL q4 administered through syringe  Nutrition consulted    Lactic acidosis, resolved  Lactic acid 3.6 on admission--> lactate 1.2  S/p  cc bolus in the ED      Chronic medical conditions:  IDDM2: last known A1c 5.9 (on 7/16/24), POC qac and qhs, PTA Lantus 10 U qhs, PTA Novolog SSI reordered as Humalog SSI, blood glucose goal < 180, f/u A1c  ESRD on HD (M/W/F, through RUE AV fistula): PTA nephrovite, did not attend HD session today since he was in the ER, plan for HD in AM, nephrology following  Hx of CVA x 2 (with residual R. sided weakness and dysphagia s/p PEG tube, previously with tracheostomy and now removed): PTA ASA 81 mg, atorvastatin 20 mg  Hx of RLE DVT (which was possibly provoked secondary to COVID19 infection?): completed apixaban course  Microcytic anemia, likely with component of anemia of ESRD: hgb 9.9 on admission, appears better than baseline, no signs of active bleeding    Medication reconciliation completed using med list provided by wife at bedside.

## 2024-10-08 ENCOUNTER — APPOINTMENT (OUTPATIENT)
Dept: HOME HEALTH SERVICES | Facility: HOME HEALTH | Age: 74
End: 2024-10-08

## 2024-10-08 LAB
ALBUMIN SERPL ELPH-MCNC: 2.8 G/DL — LOW (ref 3.3–5)
ALP SERPL-CCNC: 149 U/L — HIGH (ref 40–120)
ALT FLD-CCNC: 17 U/L — SIGNIFICANT CHANGE UP (ref 12–78)
ANION GAP SERPL CALC-SCNC: 13 MMOL/L — SIGNIFICANT CHANGE UP (ref 5–17)
ANION GAP SERPL CALC-SCNC: 8 MMOL/L — SIGNIFICANT CHANGE UP (ref 5–17)
AST SERPL-CCNC: 19 U/L — SIGNIFICANT CHANGE UP (ref 15–37)
BASOPHILS # BLD AUTO: 0.07 K/UL — SIGNIFICANT CHANGE UP (ref 0–0.2)
BASOPHILS NFR BLD AUTO: 0.2 % — SIGNIFICANT CHANGE UP (ref 0–2)
BILIRUB SERPL-MCNC: 0.7 MG/DL — SIGNIFICANT CHANGE UP (ref 0.2–1.2)
BUN SERPL-MCNC: 47 MG/DL — HIGH (ref 7–23)
BUN SERPL-MCNC: 99 MG/DL — HIGH (ref 7–23)
CALCIUM SERPL-MCNC: 9.3 MG/DL — SIGNIFICANT CHANGE UP (ref 8.5–10.1)
CALCIUM SERPL-MCNC: 9.4 MG/DL — SIGNIFICANT CHANGE UP (ref 8.5–10.1)
CHLORIDE SERPL-SCNC: 87 MMOL/L — LOW (ref 96–108)
CHLORIDE SERPL-SCNC: 99 MMOL/L — SIGNIFICANT CHANGE UP (ref 96–108)
CO2 SERPL-SCNC: 27 MMOL/L — SIGNIFICANT CHANGE UP (ref 22–31)
CO2 SERPL-SCNC: 28 MMOL/L — SIGNIFICANT CHANGE UP (ref 22–31)
CREAT SERPL-MCNC: 3.28 MG/DL — HIGH (ref 0.5–1.3)
CREAT SERPL-MCNC: 5.83 MG/DL — HIGH (ref 0.5–1.3)
EGFR: 10 ML/MIN/1.73M2 — LOW
EGFR: 19 ML/MIN/1.73M2 — LOW
EOSINOPHIL # BLD AUTO: 0.03 K/UL — SIGNIFICANT CHANGE UP (ref 0–0.5)
EOSINOPHIL NFR BLD AUTO: 0.1 % — SIGNIFICANT CHANGE UP (ref 0–6)
GAS PNL BLDA: SIGNIFICANT CHANGE UP
GAS PNL BLDA: SIGNIFICANT CHANGE UP
GLUCOSE BLDC GLUCOMTR-MCNC: 107 MG/DL — HIGH (ref 70–99)
GLUCOSE BLDC GLUCOMTR-MCNC: 127 MG/DL — HIGH (ref 70–99)
GLUCOSE BLDC GLUCOMTR-MCNC: 144 MG/DL — HIGH (ref 70–99)
GLUCOSE BLDC GLUCOMTR-MCNC: 188 MG/DL — HIGH (ref 70–99)
GLUCOSE BLDC GLUCOMTR-MCNC: 222 MG/DL — HIGH (ref 70–99)
GLUCOSE BLDC GLUCOMTR-MCNC: 360 MG/DL — HIGH (ref 70–99)
GLUCOSE BLDC GLUCOMTR-MCNC: 420 MG/DL — HIGH (ref 70–99)
GLUCOSE BLDC GLUCOMTR-MCNC: 448 MG/DL — HIGH (ref 70–99)
GLUCOSE SERPL-MCNC: 110 MG/DL — HIGH (ref 70–99)
GLUCOSE SERPL-MCNC: 335 MG/DL — HIGH (ref 70–99)
HCT VFR BLD CALC: 22.8 % — LOW (ref 39–50)
HCT VFR BLD CALC: 23.5 % — LOW (ref 39–50)
HGB BLD-MCNC: 7.5 G/DL — LOW (ref 13–17)
HGB BLD-MCNC: 7.7 G/DL — LOW (ref 13–17)
IMM GRANULOCYTES NFR BLD AUTO: 0.6 % — SIGNIFICANT CHANGE UP (ref 0–0.9)
LACTATE SERPL-SCNC: 1.2 MMOL/L — SIGNIFICANT CHANGE UP (ref 0.7–2)
LYMPHOCYTES # BLD AUTO: 0.93 K/UL — LOW (ref 1–3.3)
LYMPHOCYTES # BLD AUTO: 2.8 % — LOW (ref 13–44)
M PNEUMO IGM SER-ACNC: 0.16 INDEX — SIGNIFICANT CHANGE UP (ref 0–0.9)
MAGNESIUM SERPL-MCNC: 2.5 MG/DL — SIGNIFICANT CHANGE UP (ref 1.6–2.6)
MCHC RBC-ENTMCNC: 21.5 PG — LOW (ref 27–34)
MCHC RBC-ENTMCNC: 21.6 PG — LOW (ref 27–34)
MCHC RBC-ENTMCNC: 32.8 G/DL — SIGNIFICANT CHANGE UP (ref 32–36)
MCHC RBC-ENTMCNC: 32.9 G/DL — SIGNIFICANT CHANGE UP (ref 32–36)
MCV RBC AUTO: 65.3 FL — LOW (ref 80–100)
MCV RBC AUTO: 66 FL — LOW (ref 80–100)
MONOCYTES # BLD AUTO: 2.34 K/UL — HIGH (ref 0–0.9)
MONOCYTES NFR BLD AUTO: 7.1 % — SIGNIFICANT CHANGE UP (ref 2–14)
MYCOPLASMA AG SPEC QL: NEGATIVE — SIGNIFICANT CHANGE UP
NEUTROPHILS # BLD AUTO: 29.28 K/UL — HIGH (ref 1.8–7.4)
NEUTROPHILS NFR BLD AUTO: 89.2 % — HIGH (ref 43–77)
NRBC # BLD: 0 /100 WBCS — SIGNIFICANT CHANGE UP (ref 0–0)
NRBC # BLD: 0 /100 WBCS — SIGNIFICANT CHANGE UP (ref 0–0)
PHOSPHATE SERPL-MCNC: 0.8 MG/DL — CRITICAL LOW (ref 2.5–4.5)
PLATELET # BLD AUTO: 288 K/UL — SIGNIFICANT CHANGE UP (ref 150–400)
PLATELET # BLD AUTO: 355 K/UL — SIGNIFICANT CHANGE UP (ref 150–400)
POTASSIUM SERPL-MCNC: 3.7 MMOL/L — SIGNIFICANT CHANGE UP (ref 3.5–5.3)
POTASSIUM SERPL-MCNC: 3.8 MMOL/L — SIGNIFICANT CHANGE UP (ref 3.5–5.3)
POTASSIUM SERPL-SCNC: 3.7 MMOL/L — SIGNIFICANT CHANGE UP (ref 3.5–5.3)
POTASSIUM SERPL-SCNC: 3.8 MMOL/L — SIGNIFICANT CHANGE UP (ref 3.5–5.3)
PROCALCITONIN SERPL-MCNC: 27.4 NG/ML — HIGH (ref 0.02–0.1)
PROT SERPL-MCNC: 7.7 GM/DL — SIGNIFICANT CHANGE UP (ref 6–8.3)
RAPID RVP RESULT: SIGNIFICANT CHANGE UP
RBC # BLD: 3.49 M/UL — LOW (ref 4.2–5.8)
RBC # BLD: 3.56 M/UL — LOW (ref 4.2–5.8)
RBC # FLD: 18.4 % — HIGH (ref 10.3–14.5)
RBC # FLD: 18.6 % — HIGH (ref 10.3–14.5)
SARS-COV-2 RNA SPEC QL NAA+PROBE: SIGNIFICANT CHANGE UP
SODIUM SERPL-SCNC: 127 MMOL/L — LOW (ref 135–145)
SODIUM SERPL-SCNC: 135 MMOL/L — SIGNIFICANT CHANGE UP (ref 135–145)
WBC # BLD: 20.55 K/UL — HIGH (ref 3.8–10.5)
WBC # BLD: 32.85 K/UL — HIGH (ref 3.8–10.5)
WBC # FLD AUTO: 20.55 K/UL — HIGH (ref 3.8–10.5)
WBC # FLD AUTO: 32.85 K/UL — HIGH (ref 3.8–10.5)

## 2024-10-08 PROCEDURE — 31645 BRNCHSC W/THER ASPIR 1ST: CPT

## 2024-10-08 PROCEDURE — 99232 SBSQ HOSP IP/OBS MODERATE 35: CPT

## 2024-10-08 PROCEDURE — 99292 CRITICAL CARE ADDL 30 MIN: CPT

## 2024-10-08 PROCEDURE — 36620 INSERTION CATHETER ARTERY: CPT

## 2024-10-08 PROCEDURE — 71045 X-RAY EXAM CHEST 1 VIEW: CPT | Mod: 26,76

## 2024-10-08 PROCEDURE — 99223 1ST HOSP IP/OBS HIGH 75: CPT

## 2024-10-08 PROCEDURE — 36556 INSERT NON-TUNNEL CV CATH: CPT

## 2024-10-08 PROCEDURE — 36600 WITHDRAWAL OF ARTERIAL BLOOD: CPT | Mod: XS

## 2024-10-08 PROCEDURE — 31500 INSERT EMERGENCY AIRWAY: CPT

## 2024-10-08 PROCEDURE — 31624 DX BRONCHOSCOPE/LAVAGE: CPT

## 2024-10-08 PROCEDURE — 99291 CRITICAL CARE FIRST HOUR: CPT | Mod: 25

## 2024-10-08 PROCEDURE — 99291 CRITICAL CARE FIRST HOUR: CPT

## 2024-10-08 RX ORDER — INSULIN LISPRO 100/ML
VIAL (ML) SUBCUTANEOUS EVERY 6 HOURS
Refills: 0 | Status: DISCONTINUED | OUTPATIENT
Start: 2024-10-08 | End: 2024-10-08

## 2024-10-08 RX ORDER — INSULIN NPH HUMAN ISOPHANE 100/ML (3)
6 INSULIN PEN (ML) SUBCUTANEOUS ONCE
Refills: 0 | Status: COMPLETED | OUTPATIENT
Start: 2024-10-08 | End: 2024-10-08

## 2024-10-08 RX ORDER — PHENYLEPHRINE HCL IN 0.9% NACL 20MG/250ML
20 PLASTIC BAG, INJECTION (ML) INTRAVENOUS ONCE
Refills: 0 | Status: DISCONTINUED | OUTPATIENT
Start: 2024-10-08 | End: 2024-10-09

## 2024-10-08 RX ORDER — PROPOFOL 10 MG/ML
10 INJECTION, EMULSION INTRAVENOUS ONCE
Refills: 0 | Status: COMPLETED | OUTPATIENT
Start: 2024-10-08 | End: 2024-10-08

## 2024-10-08 RX ORDER — CHLORHEXIDINE GLUCONATE 40 MG/ML
15 SOLUTION TOPICAL EVERY 12 HOURS
Refills: 0 | Status: DISCONTINUED | OUTPATIENT
Start: 2024-10-08 | End: 2024-10-10

## 2024-10-08 RX ORDER — HYDROCORTISONE 10 MG/1
50 TABLET ORAL EVERY 6 HOURS
Refills: 0 | Status: DISCONTINUED | OUTPATIENT
Start: 2024-10-08 | End: 2024-10-10

## 2024-10-08 RX ORDER — INSULIN LISPRO 100/ML
6 VIAL (ML) SUBCUTANEOUS EVERY 6 HOURS
Refills: 0 | Status: DISCONTINUED | OUTPATIENT
Start: 2024-10-08 | End: 2024-10-08

## 2024-10-08 RX ORDER — HYDROCORTISONE 10 MG/1
50 TABLET ORAL EVERY 6 HOURS
Refills: 0 | Status: DISCONTINUED | OUTPATIENT
Start: 2024-10-08 | End: 2024-10-08

## 2024-10-08 RX ORDER — ACETAMINOPHEN 500 MG
650 TABLET ORAL ONCE
Refills: 0 | Status: COMPLETED | OUTPATIENT
Start: 2024-10-08 | End: 2024-10-08

## 2024-10-08 RX ORDER — POTASSIUM PHOSPHATE 236; 224 MG/ML; MG/ML
15 INJECTION, SOLUTION INTRAVENOUS ONCE
Refills: 0 | Status: COMPLETED | OUTPATIENT
Start: 2024-10-08 | End: 2024-10-08

## 2024-10-08 RX ORDER — INSULIN LISPRO 100/ML
VIAL (ML) SUBCUTANEOUS EVERY 6 HOURS
Refills: 0 | Status: DISCONTINUED | OUTPATIENT
Start: 2024-10-08 | End: 2024-10-23

## 2024-10-08 RX ORDER — METOPROLOL TARTRATE 50 MG
25 TABLET ORAL DAILY
Refills: 0 | Status: DISCONTINUED | OUTPATIENT
Start: 2024-10-08 | End: 2024-10-08

## 2024-10-08 RX ORDER — IPRATROPIUM BROMIDE AND ALBUTEROL SULFATE .5; 2.5 MG/3ML; MG/3ML
3 SOLUTION RESPIRATORY (INHALATION) ONCE
Refills: 0 | Status: COMPLETED | OUTPATIENT
Start: 2024-10-08 | End: 2024-10-08

## 2024-10-08 RX ORDER — PROPOFOL 10 MG/ML
40 INJECTION, EMULSION INTRAVENOUS
Qty: 1000 | Refills: 0 | Status: DISCONTINUED | OUTPATIENT
Start: 2024-10-08 | End: 2024-10-10

## 2024-10-08 RX ORDER — CHLORHEXIDINE GLUCONATE 40 MG/ML
1 SOLUTION TOPICAL
Refills: 0 | Status: DISCONTINUED | OUTPATIENT
Start: 2024-10-08 | End: 2024-10-23

## 2024-10-08 RX ORDER — FENTANYL CITRAT/DEXTROSE 5%/PF 1250MCG/50
0.5 PATIENT CONTROLLED ANALGESIA SYRINGE INTRAVENOUS
Qty: 2500 | Refills: 0 | Status: DISCONTINUED | OUTPATIENT
Start: 2024-10-08 | End: 2024-10-10

## 2024-10-08 RX ORDER — CHLORHEXIDINE GLUCONATE 40 MG/ML
1 SOLUTION TOPICAL
Refills: 0 | Status: DISCONTINUED | OUTPATIENT
Start: 2024-10-08 | End: 2024-10-08

## 2024-10-08 RX ORDER — IPRATROPIUM BROMIDE AND ALBUTEROL SULFATE .5; 2.5 MG/3ML; MG/3ML
3 SOLUTION RESPIRATORY (INHALATION) EVERY 6 HOURS
Refills: 0 | Status: DISCONTINUED | OUTPATIENT
Start: 2024-10-08 | End: 2024-10-23

## 2024-10-08 RX ORDER — GLUCAGON INJECTION, SOLUTION 1 MG/.2ML
1 INJECTION, SOLUTION SUBCUTANEOUS ONCE
Refills: 0 | Status: DISCONTINUED | OUTPATIENT
Start: 2024-10-08 | End: 2024-10-08

## 2024-10-08 RX ORDER — LABETALOL HCL 200 MG
10 TABLET ORAL ONCE
Refills: 0 | Status: COMPLETED | OUTPATIENT
Start: 2024-10-08 | End: 2024-10-08

## 2024-10-08 RX ORDER — FENTANYL CITRAT/DEXTROSE 5%/PF 1250MCG/50
50 PATIENT CONTROLLED ANALGESIA SYRINGE INTRAVENOUS ONCE
Refills: 0 | Status: DISCONTINUED | OUTPATIENT
Start: 2024-10-08 | End: 2024-10-08

## 2024-10-08 RX ORDER — INSULIN GLARGINE,HUM.REC.ANLOG 100/ML
7 VIAL (ML) SUBCUTANEOUS AT BEDTIME
Refills: 0 | Status: DISCONTINUED | OUTPATIENT
Start: 2024-10-08 | End: 2024-10-12

## 2024-10-08 RX ORDER — SODIUM CHLORIDE 9 MG/ML
10 INJECTION, SOLUTION INTRAMUSCULAR; INTRAVENOUS; SUBCUTANEOUS
Refills: 0 | Status: DISCONTINUED | OUTPATIENT
Start: 2024-10-08 | End: 2024-10-11

## 2024-10-08 RX ORDER — INSULIN LISPRO 100/ML
6 VIAL (ML) SUBCUTANEOUS ONCE
Refills: 0 | Status: COMPLETED | OUTPATIENT
Start: 2024-10-08 | End: 2024-10-08

## 2024-10-08 RX ORDER — SODIUM CHLORIDE 9 MG/ML
4 INJECTION, SOLUTION INTRAMUSCULAR; INTRAVENOUS; SUBCUTANEOUS EVERY 6 HOURS
Refills: 0 | Status: DISCONTINUED | OUTPATIENT
Start: 2024-10-08 | End: 2024-10-15

## 2024-10-08 RX ORDER — INSULIN GLARGINE,HUM.REC.ANLOG 100/ML
15 VIAL (ML) SUBCUTANEOUS AT BEDTIME
Refills: 0 | Status: DISCONTINUED | OUTPATIENT
Start: 2024-10-08 | End: 2024-10-08

## 2024-10-08 RX ORDER — NOREPINEPHRINE BITARTRATE 1 MG/ML
0.05 INJECTION, SOLUTION, CONCENTRATE INTRAVENOUS
Qty: 8 | Refills: 0 | Status: DISCONTINUED | OUTPATIENT
Start: 2024-10-08 | End: 2024-10-08

## 2024-10-08 RX ORDER — NOREPINEPHRINE BITARTRATE 1 MG/ML
0.05 INJECTION, SOLUTION, CONCENTRATE INTRAVENOUS
Qty: 16 | Refills: 0 | Status: DISCONTINUED | OUTPATIENT
Start: 2024-10-08 | End: 2024-10-10

## 2024-10-08 RX ORDER — VANCOMYCIN HYDROCHLORIDE 50 MG/ML
750 KIT ORAL ONCE
Refills: 0 | Status: COMPLETED | OUTPATIENT
Start: 2024-10-08 | End: 2024-10-08

## 2024-10-08 RX ORDER — MIDAZOLAM IN 5 % DEXTROSE/PF 15 MG/30ML
4 SYRINGE (ML) INTRAVENOUS ONCE
Refills: 0 | Status: DISCONTINUED | OUTPATIENT
Start: 2024-10-08 | End: 2024-10-08

## 2024-10-08 RX ORDER — PIPERACILLIN AND TAZOBACTAM .5; 4 G/20ML; G/20ML
4.5 INJECTION, POWDER, LYOPHILIZED, FOR SOLUTION INTRAVENOUS EVERY 12 HOURS
Refills: 0 | Status: COMPLETED | OUTPATIENT
Start: 2024-10-08 | End: 2024-10-15

## 2024-10-08 RX ADMIN — Medication 20 MILLIGRAM(S): at 21:59

## 2024-10-08 RX ADMIN — Medication 4 MILLIGRAM(S): at 16:51

## 2024-10-08 RX ADMIN — Medication 50 MICROGRAM(S): at 18:44

## 2024-10-08 RX ADMIN — Medication 50 MICROGRAM(S): at 18:00

## 2024-10-08 RX ADMIN — IPRATROPIUM BROMIDE AND ALBUTEROL SULFATE 3 MILLILITER(S): .5; 2.5 SOLUTION RESPIRATORY (INHALATION) at 09:26

## 2024-10-08 RX ADMIN — Medication 2.18 MICROGRAM(S)/KG/HR: at 18:44

## 2024-10-08 RX ADMIN — Medication 81 MILLIGRAM(S): at 14:04

## 2024-10-08 RX ADMIN — IPRATROPIUM BROMIDE AND ALBUTEROL SULFATE 3 MILLILITER(S): .5; 2.5 SOLUTION RESPIRATORY (INHALATION) at 23:44

## 2024-10-08 RX ADMIN — Medication 50 MICROGRAM(S): at 18:15

## 2024-10-08 RX ADMIN — Medication 6 UNIT(S): at 07:12

## 2024-10-08 RX ADMIN — Medication 260 MILLIGRAM(S): at 10:06

## 2024-10-08 RX ADMIN — Medication 6: at 06:14

## 2024-10-08 RX ADMIN — HYDRALAZINE HYDROCHLORIDE 100 MILLIGRAM(S): 50 TABLET, FILM COATED ORAL at 05:49

## 2024-10-08 RX ADMIN — SODIUM CHLORIDE 4 MILLILITER(S): 9 INJECTION, SOLUTION INTRAMUSCULAR; INTRAVENOUS; SUBCUTANEOUS at 19:01

## 2024-10-08 RX ADMIN — PIPERACILLIN AND TAZOBACTAM 25 GRAM(S): .5; 4 INJECTION, POWDER, LYOPHILIZED, FOR SOLUTION INTRAVENOUS at 14:10

## 2024-10-08 RX ADMIN — NOREPINEPHRINE BITARTRATE 4.08 MICROGRAM(S)/KG/MIN: 1 INJECTION, SOLUTION, CONCENTRATE INTRAVENOUS at 19:48

## 2024-10-08 RX ADMIN — PIPERACILLIN AND TAZOBACTAM 25 GRAM(S): .5; 4 INJECTION, POWDER, LYOPHILIZED, FOR SOLUTION INTRAVENOUS at 03:27

## 2024-10-08 RX ADMIN — Medication 500 MILLILITER(S): at 21:17

## 2024-10-08 RX ADMIN — NOREPINEPHRINE BITARTRATE 2.1 MICROGRAM(S)/KG/MIN: 1 INJECTION, SOLUTION, CONCENTRATE INTRAVENOUS at 20:49

## 2024-10-08 RX ADMIN — CHLORHEXIDINE GLUCONATE 15 MILLILITER(S): 40 SOLUTION TOPICAL at 18:58

## 2024-10-08 RX ADMIN — POTASSIUM PHOSPHATE 62.5 MILLIMOLE(S): 236; 224 INJECTION, SOLUTION INTRAVENOUS at 21:34

## 2024-10-08 RX ADMIN — PROPOFOL 10.4 MICROGRAM(S)/KG/MIN: 10 INJECTION, EMULSION INTRAVENOUS at 23:10

## 2024-10-08 RX ADMIN — Medication 6 UNIT(S): at 10:16

## 2024-10-08 RX ADMIN — IPRATROPIUM BROMIDE AND ALBUTEROL SULFATE 3 MILLILITER(S): .5; 2.5 SOLUTION RESPIRATORY (INHALATION) at 11:18

## 2024-10-08 RX ADMIN — Medication 10 MILLIGRAM(S): at 10:06

## 2024-10-08 RX ADMIN — Medication 2.18 MICROGRAM(S)/KG/HR: at 19:47

## 2024-10-08 RX ADMIN — Medication 1 TABLET(S): at 14:04

## 2024-10-08 RX ADMIN — SODIUM CHLORIDE 4 MILLILITER(S): 9 INJECTION, SOLUTION INTRAMUSCULAR; INTRAVENOUS; SUBCUTANEOUS at 23:44

## 2024-10-08 RX ADMIN — HYDROCORTISONE 50 MILLIGRAM(S): 10 TABLET ORAL at 23:40

## 2024-10-08 RX ADMIN — VANCOMYCIN HYDROCHLORIDE 250 MILLIGRAM(S): KIT at 10:05

## 2024-10-08 RX ADMIN — PROPOFOL 10.4 MICROGRAM(S)/KG/MIN: 10 INJECTION, EMULSION INTRAVENOUS at 19:48

## 2024-10-08 RX ADMIN — CHLORHEXIDINE GLUCONATE 1 APPLICATION(S): 40 SOLUTION TOPICAL at 14:03

## 2024-10-08 RX ADMIN — PROPOFOL 10.4 MICROGRAM(S)/KG/MIN: 10 INJECTION, EMULSION INTRAVENOUS at 16:51

## 2024-10-08 RX ADMIN — Medication 10 MILLIGRAM(S): at 05:56

## 2024-10-08 RX ADMIN — PROPOFOL 10 MILLIGRAM(S): 10 INJECTION, EMULSION INTRAVENOUS at 16:15

## 2024-10-08 RX ADMIN — Medication 6 UNIT(S): at 14:06

## 2024-10-08 RX ADMIN — PIPERACILLIN AND TAZOBACTAM 25 GRAM(S): .5; 4 INJECTION, POWDER, LYOPHILIZED, FOR SOLUTION INTRAVENOUS at 21:58

## 2024-10-08 RX ADMIN — Medication 2: at 14:07

## 2024-10-08 RX ADMIN — IPRATROPIUM BROMIDE AND ALBUTEROL SULFATE 3 MILLILITER(S): .5; 2.5 SOLUTION RESPIRATORY (INHALATION) at 19:00

## 2024-10-08 RX ADMIN — Medication 300 MILLIGRAM(S): at 14:04

## 2024-10-08 RX ADMIN — PANTOPRAZOLE SODIUM 40 MILLIGRAM(S): 40 TABLET, DELAYED RELEASE ORAL at 14:04

## 2024-10-08 NOTE — CONSULT NOTE ADULT - ASSESSMENT
Megha Art is a 73 year old male with history of HTN, IDDM2, ESRD on HD (M/W/F, through RUE AV fistula), hx of CVA x 2 (with residual R. sided weakness and dysphagia s/p PEG tube, previously with tracheostomy and now removed), and hx of RLE DVT (completed apixaban course) who presented to the ED on 10/4/24 for complaints of cough and chills. Baseline functional status is wheelchair bound and dependent with all ADLs. NPO with Nepro q4. Lives at home with wife. Initially placed on 15L NRB with improvement of SpO2 to 95%. Then changed to high flow nasal cannula. Desaturated on 10/8, and switched to BiPAP  Patient was admitted in July to the hospital and received 7 days of antibiotics   He grew citrobacter that was quite sensitive but could have more resistant organisms given the hospitalization   additionally he is a hemodialysis patient getting hemodialysis 3 days a week and has a degree of being immunocompromised     Multifocal pneumonia  Acute respiratory failure due to hypoxia    ESRD on HD  IDDM2     Plan:  increase dose of Zosyn to 4.5g q12hrs for better pseudomonas coverage   follow blood cultures   if intubation consider bronchoscopy and send cultures from the bronch   MRSA negative with a good negative PPV thus can stop giving doses of vancomycin for now  received one dose of vancomycin today   wean oxygen requirement as tolerated   good glycemic control and avoid hypoglycemia  continue hemodialysis as per renal     Discussed with ICU and pulmonary teams who are in agreement       Nelson Magallon DO  Chief, Infectious Disease at [facility]  Reachable via [employer] Teams or [place] office: [phone]  Weekdays: After 5pm, please call [phone] for all inquiries and new consults  Weekends: Message on-call infectious disease physician via teams (see Ayah)

## 2024-10-08 NOTE — CONSULT NOTE PEDS - SUBJECTIVE AND OBJECTIVE BOX
HPI:  Megha Art is a 73 year old male with PMHx of HTN, IDDM2, ESRD on HD (M/W/F, through RUE AV fistula), hx of CVA x 2 (with residual R. sided weakness and dysphagia s/p PEG tube, previously with tracheostomy and now removed), and hx of RLE DVT (completed apixaban course) who presented to the ED on 10/4/24 for complaints of cough and chills.    History obtained from wife at bedside. As per wife, patient typically coughs at baseline since he previously had a tracheostomy. However, for the past 2-3 days, he has been coughing more frequently and it has been productive with yellow phlegm. Wife was assisting him with getting dressed this afternoon around 1 PM when he felt nauseous and started complaining of abdominal pain. Then had a bowel movement. When wife was cleaning him up, patient was shaking "like he had chills" and she thinks his eyes rolled back for a few seconds. No loss of consciousness. EMS was called and another episode of shaking upon EMS arrival. Of note, patient is due for dialysis today but did not receive it since he came to the ER today. Baseline functional status is wheelchair bound and dependent with all ADLs. NPO with Nepro q4. Lives at home with wife.     In the ED, Tmax 101.3, HR as elevated as 118, BP as elevated as 201/68, and SpO2 as low as 91% on room air. Initially placed on 15L NRB with improvement of SpO2 to 95%. Now on room air. WBC 17.38K, hgb 9.9, sodium 129, BUN 61, Cr 4.05, alkphos 169. Lactic acid 3.6. CT head without evidence of acute hemorrhage mass or mass effect but with encephalomalacia and gliosis again seen involving the bifrontal region. CT CAP with pneumonia and rectum distended with stool. Received  cc bolus, vancomycin 1 g IV, zosyn 3.375 g IV, acetaminophen 1 g IV, hydralazine 10 mg IV, pantoprazole 40 mg IV, and ondansetron 4 mg IV. Evaluated by nephrology who is planning for HD tomorrow. (04 Oct 2024 22:03)      PAST MEDICAL & SURGICAL HISTORY:  ESRD on hemodialysis      HTN (hypertension)      Insulin dependent type 2 diabetes mellitus      History of cerebrovascular accident (CVA) with residual deficit      History of DVT of lower extremity      Arteriovenous fistula      S/P percutaneous endoscopic gastrostomy (PEG) tube placement      History of tracheostomy          FAMILY HISTORY:  FHx: diabetes mellitus (Father, Aunt)        SOCIAL HISTORY:  Smoking: __ packs x ___ years  EtOH Use:  Marital Status:  Occupation:  Exposures:  Recent Travel:    Allergies    No Known Allergies    Intolerances        HOME MEDICATIONS:    REVIEW OF SYSTEMS:  Non verbal unable to obtain full HPI/ ROS    OBJECTIVE:   Vital Signs Last 24 Hrs  T(C): 38.2 (08 Oct 2024 09:45), Max: 38.2 (08 Oct 2024 09:45)  T(F): 100.8 (08 Oct 2024 09:45), Max: 100.8 (08 Oct 2024 09:45)  HR: 95 (08 Oct 2024 09:45) (93 - 113)  BP: 168/82 (08 Oct 2024 09:45) (153/72 - 204/74)  BP(mean): --  ABP: --  ABP(mean): --  RR: 24 (08 Oct 2024 10:00) (18 - 24)  SpO2: 98% (08 Oct 2024 10:00) (81% - 98%)    O2 Parameters below as of 08 Oct 2024 10:42  Patient On (Oxygen Delivery Method): high flow NC              10-07 @ 07:01  -  10-08 @ 07:00  --------------------------------------------------------  IN: 474 mL / OUT: 0 mL / NET: 474 mL      CAPILLARY BLOOD GLUCOSE      POCT Blood Glucose.: 360 mg/dL (08 Oct 2024 09:11)      PHYSICAL EXAM:  CONSTITUTIONAL: NAD, chronically ill appearing   RESPIRATORY: Rhonchi Right basilar, no wheeze. No resp distress   CARDIOVASCULAR: Regular rate and rhythm, normal S1 and S2, no murmur/rub/gallop; No lower extremity edema  ABDOMEN: Nontender to palpation, normoactive bowel sounds, no rebound/guarding. +PEG tube, incision site dry/clean/intact   SKIN: warm to touch   PSYCH: A+O to person, place  Neuro: non verbal at baseline, non focal, nods head yes/ no to some answers       HOSPITAL MEDICATIONS:  MEDICATIONS  (STANDING):  albuterol/ipratropium for Nebulization 3 milliLiter(s) Nebulizer every 6 hours  amLODIPine   Tablet 10 milliGRAM(s) Oral daily  aspirin  chewable 81 milliGRAM(s) Enteral Tube daily  atorvastatin 20 milliGRAM(s) Oral at bedtime  chlorhexidine 2% Cloths 1 Application(s) Topical daily  dextrose 5%. 1000 milliLiter(s) (50 mL/Hr) IV Continuous <Continuous>  dextrose 5%. 1000 milliLiter(s) (100 mL/Hr) IV Continuous <Continuous>  dextrose 50% Injectable 12.5 Gram(s) IV Push once  dextrose 50% Injectable 25 Gram(s) IV Push once  dextrose 50% Injectable 25 Gram(s) IV Push once  ferrous    sulfate Liquid 300 milliGRAM(s) Enteral Tube daily  glucagon  Injectable 1 milliGRAM(s) IntraMuscular once  hydrALAZINE 100 milliGRAM(s) Oral three times a day  insulin glargine Injectable (LANTUS) 15 Unit(s) SubCutaneous at bedtime  insulin lispro (ADMELOG) corrective regimen sliding scale   SubCutaneous every 6 hours  insulin lispro Injectable (ADMELOG) 6 Unit(s) SubCutaneous every 6 hours  metoprolol succinate ER 25 milliGRAM(s) Oral daily  Nephro-noam 1 Tablet(s) Oral daily  pantoprazole   Suspension 40 milliGRAM(s) Enteral Tube daily  piperacillin/tazobactam IVPB.. 3.375 Gram(s) IV Intermittent every 12 hours  sodium chloride 3%  Inhalation 4 milliLiter(s) Inhalation every 6 hours    MEDICATIONS  (PRN):  acetaminophen     Tablet .. 650 milliGRAM(s) Oral every 6 hours PRN Temp greater or equal to 38C (100.4F), Mild Pain (1 - 3)  albuterol/ipratropium for Nebulization 3 milliLiter(s) Nebulizer every 6 hours PRN Shortness of Breath and/or Wheezing  aluminum hydroxide/magnesium hydroxide/simethicone Suspension 30 milliLiter(s) Oral every 4 hours PRN Dyspepsia  dextrose Oral Gel 15 Gram(s) Oral once PRN Blood Glucose LESS THAN 70 milliGRAM(s)/deciliter  melatonin 3 milliGRAM(s) Oral at bedtime PRN Insomnia  ondansetron Injectable 4 milliGRAM(s) IV Push every 8 hours PRN Nausea and/or Vomiting      LABS:                        10.1   19.76 )-----------( 339      ( 07 Oct 2024 18:35 )             31.4     10-07    131[L]  |  91[L]  |  74[H]  ----------------------------<  197[H]  3.7   |  25  |  5.06[H]    Ca    10.1      07 Oct 2024 18:35        Urinalysis Basic - ( 07 Oct 2024 18:35 )    Color: x / Appearance: x / SG: x / pH: x  Gluc: 197 mg/dL / Ketone: x  / Bili: x / Urobili: x   Blood: x / Protein: x / Nitrite: x   Leuk Esterase: x / RBC: x / WBC x   Sq Epi: x / Non Sq Epi: x / Bacteria: x      Arterial Blood Gas:  10-07 @ 18:16  7.48/38/52/28/89.0/4.5  ABG lactate: --        MICROBIOLOGY:     Culture - Blood (10.04.24 @ 16:40)    Specimen Source: .Blood BLOOD   Culture Results:   No growth at 72 Hours    Culture - Blood (10.04.24 @ 16:30)    Specimen Source: .Blood BLOOD   Culture Results:   No growth at 72 Hours          RADIOLOGY:    < from: CT Angio Chest PE Protocol w/ IV Cont (10.07.24 @ 20:28) >  FINDINGS:    LUNGS AND LARGE AIRWAYS: Secretions within the right-sided airways.   Worsening right lower lobe consolidation. There is additional   consolidation in the right middle lobe. There are scattered tree-in-bud   nodules in the remainder of the right lung. No pulmonary nodules.  PLEURA: Trace right pleural effusion.  VESSELS: Right upper extremity graft is noted.  HEART: Heart size is normal.  No pericardial effusion.  MEDIASTINUM AND LISA: No lymphadenopathy.  CHEST WALL AND LOWER NECK: Within normal limits.  UPPER ABDOMEN: Gastrostomy tube noted.  BONES: Within normal limits.    IMPRESSION: Worsening right-sided consolidation with tree-in-bud nodules;   recommend one-month follow-up.    No pulmonary embolus.        --- End of Report ---    < end of copied text >      < from: CT Head No Cont (10.04.24 @ 17:45) >  IMPRESSION: No evidence of acute hemorrhage mass or mass effect    Encephalomalacia and gliosis again seen involving the bifrontal region.    --- End of Report ---    < end of copied text >

## 2024-10-08 NOTE — PROGRESS NOTE ADULT - SUBJECTIVE AND OBJECTIVE BOX
[facility] NEPHROLOGY SERVICES, [facility]  NEPHROLOGY AND HYPERTENSION  [address]  [address]  [place] [address]  [phone]    MD RONNA CHANG MD YELENA ROSENBERG, MD BINNY KOSHY, MD CHRISTOPHER CAPUTO, MD EDWARD BOVER, MD          Patient events noted  Worsening hypoxia while on HD    MEDICATIONS  (STANDING):  albuterol/ipratropium for Nebulization 3 milliLiter(s) Nebulizer every 6 hours  aspirin  chewable 81 milliGRAM(s) Enteral Tube daily  atorvastatin 20 milliGRAM(s) Oral at bedtime  chlorhexidine 0.12% Liquid 15 milliLiter(s) Oral Mucosa every 12 hours  chlorhexidine 2% Cloths 1 Application(s) Topical daily  chlorhexidine 2% Cloths 1 Application(s) Topical <User Schedule>  fentaNYL    Injectable 50 MICROGram(s) IV Push once  fentaNYL   Infusion. 0.5 MICROgram(s)/kG/Hr (2.18 mL/Hr) IV Continuous <Continuous>  ferrous    sulfate Liquid 300 milliGRAM(s) Enteral Tube daily  insulin glargine Injectable (LANTUS) 15 Unit(s) SubCutaneous at bedtime  insulin lispro (ADMELOG) corrective regimen sliding scale   SubCutaneous every 6 hours  lactated ringers Bolus 500 milliLiter(s) IV Bolus once  Nephro-noam 1 Tablet(s) Oral daily  norepinephrine Infusion 0.05 MICROgram(s)/kG/Min (4.08 mL/Hr) IV Continuous <Continuous>  pantoprazole   Suspension 40 milliGRAM(s) Enteral Tube daily  phenylephrine   100 mCg/mL NaCl 0.9% Injectable 20 MICROGram(s) IV Push once  piperacillin/tazobactam IVPB.. 4.5 Gram(s) IV Intermittent every 12 hours  propofol Infusion 40 MICROgram(s)/kG/Min (10.4 mL/Hr) IV Continuous <Continuous>  sodium chloride 3%  Inhalation 4 milliLiter(s) Inhalation every 6 hours    MEDICATIONS  (PRN):  acetaminophen     Tablet .. 650 milliGRAM(s) Oral every 6 hours PRN Temp greater or equal to 38C (100.4F), Mild Pain (1 - 3)  albuterol/ipratropium for Nebulization 3 milliLiter(s) Nebulizer every 6 hours PRN Shortness of Breath and/or Wheezing  aluminum hydroxide/magnesium hydroxide/simethicone Suspension 30 milliLiter(s) Oral every 4 hours PRN Dyspepsia  melatonin 3 milliGRAM(s) Oral at bedtime PRN Insomnia  ondansetron Injectable 4 milliGRAM(s) IV Push every 8 hours PRN Nausea and/or Vomiting      10-07-24 @ 07:01  -  10-08-24 @ 07:00  --------------------------------------------------------  IN: 474 mL / OUT: 0 mL / NET: 474 mL    10-08-24 @ 07:01  -  10-08-24 @ 18:55  --------------------------------------------------------  IN: 0 mL / OUT: 1000 mL / NET: -1000 mL      PHYSICAL EXAM:      T(C): 36.6 (10-08-24 @ 14:45), Max: 38.2 (10-08-24 @ 09:45)  HR: 93 (10-08-24 @ 17:01) (89 - 111)  BP: 162/71 (10-08-24 @ 14:45) (135/68 - 183/67)  RR: 26 (10-08-24 @ 14:45) (18 - 26)  SpO2: 92% (10-08-24 @ 17:01) (81% - 99%)  Wt(kg): --  Lungs decreased BS left > R   Heart S1S2  Abd soft NT ND  Extremities:   tr edema                                    7.5    20.55 )-----------( 288      ( 08 Oct 2024 12:30 )             22.8     10-08    127[L]  |  87[L]  |  99[H]  ----------------------------<  335[H]  3.7   |  27  |  5.83[H]    Ca    9.3      08 Oct 2024 12:30      ABG - ( 08 Oct 2024 17:16 )  pH, Arterial: 7.51  pH, Blood: x     /  pCO2: 38    /  pO2: 63    / HCO3: 30    / Base Excess: 6.8   /  SaO2: 93.8                Creatinine Trend: 5.83<--, 5.06<--, 4.79<--, 4.05<--  Mode: AC/ CMV (Assist Control/ Continuous Mandatory Ventilation)  RR (machine): 16  TV (machine): 400  FiO2: 100  PEEP: 12  ITime: 0.9  MAP: 17  PIP: 35      Assessment   ESRD, PNA, left pleural effusion  Respiratory failure     Plan:  HD for today;   Terminated early due to hypoxia  Intubated  Will arrange for HD for tomorrow         Delonte Moya MD

## 2024-10-08 NOTE — CONSULT NOTE PEDS - ASSESSMENT
73 year old male with PMHx of HTN, IDDM2, ESRD on HD (M/W/F, through RUE AV fistula), hx of CVA x 2 (with residual R. sided weakness and dysphagia s/p PEG tube, previously with tracheostomy and now removed), and hx of RLE DVT (completed apixaban course) who presented to the ED on 10/4/24 for complaints of cough and chills. Admitted to [facility] with acute hypoxemic resp failure 2/2 RLL PNA, hypertensive emergency, hyponatremia. RRT 10/8 for worsneing hypoxemia requiring HFNC. Pulmonary consulted.           Reccs:    - Acute hypoxemic respiratory failure likely in setting of RLL PNA.   - Would continue with zosyn for now given clinical severity   - RRT today 10/8 for worsening hypoxemia started on HFNC 50L/90%- continue and titrate FiO2 as tolerates for goal O2> 90%  - Needs Aggressive Chest PT, Pulmonary toileting. C/W Duonebs, Hypersal, Chest vest for facilitate secretion mobilization and expectoration.   - Would position Left lung down to facilitate better oxygenation   - F/U RVP, Mycoplasma Igm, blood cultures   - Strep Ag, Legionella Ag, MRSA PCR negative  - Bedside Pulmonary POCUS 10/8: A- Lines anteriorly, No Pleural effusion bilaterally, RLL consolidation pattern  - Optimize BP control   - HD as per Nephro    Discussed with MD Jang.

## 2024-10-08 NOTE — CONSULT NOTE ADULT - SUBJECTIVE AND OBJECTIVE BOX
CHIEF COMPLAINT:    HPI:  Megha Art is a 73 year old male with history of HTN, IDDM2, ESRD on HD (M/W/F, through RUE AV fistula), hx of CVA x 2 (with residual R. sided weakness and dysphagia s/p PEG tube, previously with tracheostomy and now removed), and hx of RLE DVT (completed apixaban course) who presented to the ED on 10/4/24 for complaints of cough and chills. Baseline functional status is wheelchair bound and dependent with all ADLs. NPO with Nepro q4. Lives at home with wife. Initially placed on 15L NRB with improvement of SpO2 to 95%. Then changed to high flow nasal cannula. Desaturated on 10/8, and switched to BiPAP. ICU consulted for further recommendations. Patient with accessory muscle use. Decreased right sided breath sounds. Stopped HD session after 1 L removal of fluid in order to bring him to the ICU and emergently intubate.    PAST MEDICAL & SURGICAL HISTORY:  ESRD on hemodialysis  HTN (hypertension)  Insulin dependent type 2 diabetes mellitus  History of cerebrovascular accident (CVA) with residual deficit  History of DVT of lower extremity  Arteriovenous fistula  S/P percutaneous endoscopic gastrostomy (PEG) tube placement  History of tracheostomy    FAMILY HISTORY:  FHx: diabetes mellitus (Father, Aunt)    SOCIAL HISTORY:  Smoking: unknown  EtOH Use:  Marital Status:   Occupation:  Recent Travel:  Country of Birth:  Advance Directives: Full code    Allergies: No Known Allergies  Intolerances    HOME MEDICATIONS:    REVIEW OF SYSTEMS: X  Constitutional:   Eyes:  ENT:  CV:  Resp:  GI:  :  MSK:  Integumentary:  Neurological:  Psychiatric:  Endocrine:  Hematologic/Lymphatic:  Allergic/Immunologic:  [ ] All other systems negative  [X ] Unable to assess ROS because pt with BiPAP on and then sedated.    OBJECTIVE:  ICU Vital Signs Last 24 Hrs  T(C): 36.6 (08 Oct 2024 14:45), Max: 38.2 (08 Oct 2024 09:45)  T(F): 97.8 (08 Oct 2024 14:45), Max: 100.8 (08 Oct 2024 09:45)  HR: 102 (08 Oct 2024 14:45) (89 - 113)  BP: 162/71 (08 Oct 2024 14:45) (135/68 - 204/74)  BP(mean): --  ABP: --  ABP(mean): --  RR: 26 (08 Oct 2024 14:45) (18 - 26)  SpO2: 99% (08 Oct 2024 15:24) (81% - 99%)    O2 Parameters below as of 08 Oct 2024 14:45  Patient On (Oxygen Delivery Method): BiPAP/CPAP              10-07 @ 07:01  -  10-08 @ 07:00  --------------------------------------------------------  IN: 474 mL / OUT: 0 mL / NET: 474 mL    10-08 @ 07:01  -  10-08 @ 16:44  --------------------------------------------------------  IN: 0 mL / OUT: 1000 mL / NET: -1000 mL      CAPILLARY BLOOD GLUCOSE      POCT Blood Glucose.: 222 mg/dL (08 Oct 2024 14:27)      PHYSICAL EXAM:  General: Patient appears uncomfortable, in mild to moderate respiratory distress  HEENT: EOMI, No scleral icterus.   Neck: Trachea midline  Respiratory: Decreased breath sounds on R.  Cardiovascular: Regular rhythm, tachycardic.   Abdomen: soft, non-distended. PEG in place.   Extremities: No lower extremity edema  Skin:  warm, dry      HOSPITAL MEDICATIONS:  MEDICATIONS  (STANDING):  albuterol/ipratropium for Nebulization 3 milliLiter(s) Nebulizer every 6 hours  amLODIPine   Tablet 10 milliGRAM(s) Oral daily  aspirin  chewable 81 milliGRAM(s) Enteral Tube daily  atorvastatin 20 milliGRAM(s) Oral at bedtime  chlorhexidine 2% Cloths 1 Application(s) Topical daily  chlorhexidine 2% Cloths 1 Application(s) Topical <User Schedule>  dextrose 5%. 1000 milliLiter(s) (50 mL/Hr) IV Continuous <Continuous>  dextrose 5%. 1000 milliLiter(s) (100 mL/Hr) IV Continuous <Continuous>  dextrose 50% Injectable 12.5 Gram(s) IV Push once  dextrose 50% Injectable 25 Gram(s) IV Push once  dextrose 50% Injectable 25 Gram(s) IV Push once  ferrous    sulfate Liquid 300 milliGRAM(s) Enteral Tube daily  glucagon  Injectable 1 milliGRAM(s) IntraMuscular once  hydrALAZINE 100 milliGRAM(s) Oral three times a day  insulin glargine Injectable (LANTUS) 15 Unit(s) SubCutaneous at bedtime  insulin lispro (ADMELOG) corrective regimen sliding scale   SubCutaneous every 6 hours  insulin lispro Injectable (ADMELOG) 6 Unit(s) SubCutaneous every 6 hours  metoprolol succinate ER 25 milliGRAM(s) Oral daily  midazolam Injectable 4 milliGRAM(s) IV Push once  Nephro-noam 1 Tablet(s) Oral daily  pantoprazole   Suspension 40 milliGRAM(s) Enteral Tube daily  piperacillin/tazobactam IVPB.. 4.5 Gram(s) IV Intermittent every 12 hours  propofol Infusion 40 MICROgram(s)/kG/Min (10.4 mL/Hr) IV Continuous <Continuous>  sodium chloride 3%  Inhalation 4 milliLiter(s) Inhalation every 6 hours    MEDICATIONS  (PRN):  acetaminophen     Tablet .. 650 milliGRAM(s) Oral every 6 hours PRN Temp greater or equal to 38C (100.4F), Mild Pain (1 - 3)  albuterol/ipratropium for Nebulization 3 milliLiter(s) Nebulizer every 6 hours PRN Shortness of Breath and/or Wheezing  aluminum hydroxide/magnesium hydroxide/simethicone Suspension 30 milliLiter(s) Oral every 4 hours PRN Dyspepsia  dextrose Oral Gel 15 Gram(s) Oral once PRN Blood Glucose LESS THAN 70 milliGRAM(s)/deciliter  melatonin 3 milliGRAM(s) Oral at bedtime PRN Insomnia  ondansetron Injectable 4 milliGRAM(s) IV Push every 8 hours PRN Nausea and/or Vomiting      LABS:                        7.5    20.55 )-----------( 288      ( 08 Oct 2024 12:30 )             22.8     10-08    127[L]  |  87[L]  |  99[H]  ----------------------------<  335[H]  3.7   |  27  |  5.83[H]    Ca    9.3      08 Oct 2024 12:30        Urinalysis Basic - ( 08 Oct 2024 12:30 )    Color: x / Appearance: x / SG: x / pH: x  Gluc: 335 mg/dL / Ketone: x  / Bili: x / Urobili: x   Blood: x / Protein: x / Nitrite: x   Leuk Esterase: x / RBC: x / WBC x   Sq Epi: x / Non Sq Epi: x / Bacteria: x      Arterial Blood Gas:  10-07 @ 18:16  7.48/38/52/28/89.0/4.5  ABG lactate: --

## 2024-10-08 NOTE — CONSULT NOTE ADULT - ASSESSMENT
Mr. Art is a 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    Neuro: Sedate with propofol. Main require additional sedatives. Will continue to assess.  CV: Patient currently on levophed, which was started when starting sedation. Weaning as tolerated.  Respiratory: Acute hypoxic respiratory failure requiring intubation. RLL pneumonia, likely aspiration. S/p bronchoscopy after intubation with exceedingly high amount of thick white secretions in R mainstem, R lower and R middle lobes. BAL samples sent.  Mr. Art is a 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    Neuro: Sedate with propofol. Main require additional sedatives. Will continue to assess.  CV: Patient currently on levophed, which was started when starting sedation. Weaning as tolerated.  Respiratory: Acute hypoxic respiratory failure requiring intubation. RLL pneumonia, likely aspiration. S/p bronchoscopy after intubation with exceedingly high amount of thick white secretions in R mainstem, R lower and R middle lobes. BAL samples sent. Continue zosyn for now.  GI: Continue feeds via PEG.  REnal: Hx ESRD on HD. Interrupted today's session. Will dialyze more tomorrow.  Endocrine: Hx DM. ISS and FS q6hrs.  ID: RLL PNA. Likely aspiration. F/u BAL cx. Continue zosyn. MRSA negative. Legionella negative.   Ethics: Full code. Mr. Art is a 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    Neuro: Sedate with propofol. Main require additional sedatives. Will continue to assess.  CV: Patient currently on levophed, which was started when starting sedation. Weaning as tolerated.  Respiratory: Acute hypoxic respiratory failure requiring intubation. RLL pneumonia, likely aspiration. S/p bronchoscopy after intubation with exceedingly high amount of thick white secretions in R mainstem, R lower and R middle lobes. BAL samples sent. Continue zosyn for now.  GI: Continue feeds via PEG.  REnal: Hx ESRD on HD. Interrupted today's session. Will dialyze more tomorrow.  Endocrine: Hx DM. ISS and FS q6hrs. Has been getting lantus 15. Will decrease to 7 units for now. Can uptitrate.   ID: RLL PNA. Likely aspiration. F/u BAL cx. Continue zosyn. MRSA negative. Legionella negative.   Ethics: Full code. Mr. Art is a 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    Neuro: Sedate with propofol. Main require additional sedatives. Will continue to assess.  CV: Patient currently on levophed, which was started when starting sedation. Weaning as tolerated.  Respiratory: Acute hypoxic respiratory failure requiring intubation. RLL pneumonia, likely aspiration. S/p bronchoscopy after intubation with exceedingly high amount of thick white secretions in R mainstem, R lower and R middle lobes. BAL samples sent. Continue zosyn for now. hydrocortisone 50mg q6hrs for severe pneumonia per [facility] trial.  GI: Continue feeds via PEG.  REnal: Hx ESRD on HD. Interrupted today's session. Will dialyze more tomorrow.  Endocrine: Hx DM. ISS and FS q6hrs. Has been getting lantus 15. Will decrease to 7 units for now. Can uptitrate.   ID: RLL PNA. Likely aspiration. F/u BAL cx. Continue zosyn. MRSA negative. Legionella negative.   Ethics: Full code.

## 2024-10-08 NOTE — RAPID RESPONSE TEAM SUMMARY - NSADDTLFINDINGSRRT_GEN_ALL_CORE
/78  RR 26 T99.4 O2 sat 81% blood sugar 222
/73  RR 18 T 98.7 blood sugar 360
/74  RR 26 T 99.2 blood sugar 221

## 2024-10-08 NOTE — PROGRESS NOTE ADULT - ASSESSMENT
Megha Art is a 73 year old male with PMHx of HTN, IDDM2, ESRD on HD (M/W/F, through RUE AV fistula), hx of CVA x 2 (with residual R. sided weakness and dysphagia s/p PEG tube, previously with tracheostomy and now removed), and hx of RLE DVT (completed apixaban course) who presented to the ED on 10/4/24 for complaints of cough and chills and admitted for sepsis and acute hypoxic respiratory failure secondary to suspected aspiration PNA vs. HCAP.    Sepsis and acute hypoxic respiratory failure secondary to suspected aspiration PNA vs. HCAP  Wife reports patient with increased productive cough with yellow phlegm x 2-3 days  Temp 101.3, , WBC 17.38K on admission  SpO2 as low as 91% on room air, placed on 15L NRB with improvement of SpO2 to 95%, now SpO2 mid 90s on room air  CT chest with secretions in the R. mainstem and RLL bronchus, RLL consolidation and patchy opacities in the RUL  S/p  cc bolus, vancomycin 1 g IV, zosyn 3.375 g IV, and acetaminophen 1 g IV in the ED  Zosyn 3.375 g IV continued for now for empiric HCAP coverage given recent hospitalization-- added vanc   Procal elevated 22.5, MRSA/MSSA PCR neg  Urine Legionella, urine Strep neg, Mycoplasma IgM pending  10/4 Blood cultures NGTD, sputum cx ordered   Pulm and ID consulted   CTA neg for PE but Worsening right-sided consolidation with tree-in-bud nodules  NC--> NRB--> HFNC--> BIPAP with little improvement RRT x3 called   Aggressive Chest PT, Duonebs, hypersal   ICU consulted--- accepted for respiratory failure, taken for intubation in the unit   Wife updated at bedside and on the phone   patient is FULL CODE      Hypertensive urgency  BP as elevated as 201/68 on admission  S/p hydralazine 10 mg IV in the ED  PTA hydralazine 50 mg TID--- increased   nifedipine ER 90 mg held given ER cannot be crushed, discussed with pharmacist  F/u TSH 1.66, lipid panel LDL 17, A1c 4.8 for risk stratification0  Telemetry    Hyponatremia   -Na 130--> 129--> 127  -nephrology following  for HD   - pending repeat BMP  - HD today     Constipation, resolved  CT A/P with rectum distended with stool  PTA Miralax 17 g daily  Senna    Continuous BM yesterday, hold stool softeners    Tube feeds  Patient has been NPO with tube feeds since CVA as per wife  Tube feeding modality is bolus of Nepro with carb steady 237 mL q4 administered through syringe  Nutrition consulted    Lactic acidosis, resolved  Lactic acid 3.6 on admission--> lactate 1.2  S/p  cc bolus in the ED      Chronic medical conditions:  IDDM2: last known A1c 5.9 (on 7/16/24), POC qac and qhs, PTA Lantus 10 U qhs, PTA Novolog SSI reordered as Humalog SSI, blood glucose goal < 180, f/u A1c  ESRD on HD (M/W/F, through RUE AV fistula): PTA nephrovite, did not attend HD session today since he was in the ER, plan for HD in AM, nephrology following  Hx of CVA x 2 (with residual R. sided weakness and dysphagia s/p PEG tube, previously with tracheostomy and now removed): PTA ASA 81 mg, atorvastatin 20 mg  Hx of RLE DVT (which was possibly provoked secondary to COVID19 infection?): completed apixaban course  Microcytic anemia, likely with component of anemia of ESRD: hgb 9.9 on admission, appears better than baseline, no signs of active bleeding    Transferred to ICU- to be intubated, wife aware.   FULL CODE.

## 2024-10-08 NOTE — CONSULT NOTE ADULT - ASSESSMENT
73 year old male with PMHx of HTN, IDDM2, ESRD on HD (M/W/F, through RUE AV fistula), hx of CVA x 2 (with residual R. sided weakness and dysphagia s/p PEG tube, previously with tracheostomy and now removed), and hx of RLE DVT (completed apixaban course) who presented to the ED on 10/4/24 for complaints of cough and chills. Admitted to [facility] with acute hypoxemic resp failure 2/2 RLL PNA, hypertensive emergency, hyponatremia. RRT 10/8 for worsneing hypoxemia requiring HFNC. Pulmonary consulted.           Reccs:    - Acute hypoxemic respiratory failure likely in setting of RLL PNA.   - Would continue with zosyn for now given clinical severity   - RRT today 10/8 for worsening hypoxemia started on HFNC 50L/90%- continue and titrate FiO2 as tolerates for goal O2> 90%  - Needs Aggressive Chest PT, Pulmonary toileting. C/W Duonebs, Hypersal, Chest vest for facilitate secretion mobilization and expectoration.   - Would position Left lung down to facilitate better oxygenation   - F/U RVP, Mycoplasma Igm, blood cultures   - Strep Ag, Legionella Ag, MRSA PCR negative  - Bedside Pulmonary POCUS 10/8: A- Lines anteriorly, No Pleural effusion bilaterally, RLL consolidation pattern  - Optimize BP control   - HD as per Nephro    Discussed with MD Jang.

## 2024-10-08 NOTE — CONSULT NOTE ADULT - NS ATTEND AMEND GEN_ALL_CORE FT
73M w/ HTN, DM, ESRD on HD, CVA w/ residual R weakness and dysphagia s/p PEG and trach since decannulated, RLE DVT. Presents w/ cough w/ sputum production. Febrile in the ED, noted leukocytosis. CT chest shows RLL pneumonia. Since admission, pt has developed acute hypoxemic respiratory failure. Multiple RRTs called for hypoxia. Noted to be HTN as well during these episodes.    - hypoxia may be multifactorial - large R pneumonia which is worsening on imaging +/- component of pulmonary edema in setting of extreme HTN in renal failure  - started on HFNC, satting ~91-93% on 50L 90%; maintain O2 sat >92%  - please put L lung down in setting of R infiltrate to optimize oxygenation  - continue broad spectrum abx coverage w/ zosyn; send sputum cx if able  - on imaging pt has a lot of secretions and tree in bud likely representing high sputum burden - start aggressive pulmonary toilet w/ duonebs + hypersal nebs q6 with chest vest   - check mycoplasma, strept pneumo ag, legionella, MRSA PCR  - HD today, needs better BP control in case pulmonary edema playing part in hypoxia  - pulmonary to follow along with you    Plan of care discussed w/ floor team

## 2024-10-08 NOTE — PROCEDURE NOTE - NSBRONCHHISTORY_GEN_A_CORE_FT
PAtient with hypoxic respiratory failure and RLL pneumonia requiring intubation. Likely aspiration and mucous plugging.

## 2024-10-08 NOTE — PROGRESS NOTE ADULT - SUBJECTIVE AND OBJECTIVE BOX
Patient is a 73y old  Male who presents with a chief complaint of Sepsis and acute hypoxic respiratory failure secondary to suspected aspiration PNA vs. HCAP (08 Oct 2024 12:57)      INTERVAL HPI/OVERNIGHT EVENTS: RRT yesterday and 2 this AM for hypoxia in the 70s, placed on NRB then HFNC with little improvement. Also increased work of breathing, placed on BIPAP. ICU consulted. Wife updated at bedside, then later on the phone.     MEDICATIONS  (STANDING):  albuterol/ipratropium for Nebulization 3 milliLiter(s) Nebulizer every 6 hours  amLODIPine   Tablet 10 milliGRAM(s) Oral daily  aspirin  chewable 81 milliGRAM(s) Enteral Tube daily  atorvastatin 20 milliGRAM(s) Oral at bedtime  chlorhexidine 2% Cloths 1 Application(s) Topical daily  chlorhexidine 2% Cloths 1 Application(s) Topical <User Schedule>  dextrose 5%. 1000 milliLiter(s) (50 mL/Hr) IV Continuous <Continuous>  dextrose 5%. 1000 milliLiter(s) (100 mL/Hr) IV Continuous <Continuous>  dextrose 50% Injectable 25 Gram(s) IV Push once  dextrose 50% Injectable 12.5 Gram(s) IV Push once  dextrose 50% Injectable 25 Gram(s) IV Push once  ferrous    sulfate Liquid 300 milliGRAM(s) Enteral Tube daily  glucagon  Injectable 1 milliGRAM(s) IntraMuscular once  hydrALAZINE 100 milliGRAM(s) Oral three times a day  insulin glargine Injectable (LANTUS) 15 Unit(s) SubCutaneous at bedtime  insulin lispro (ADMELOG) corrective regimen sliding scale   SubCutaneous every 6 hours  insulin lispro Injectable (ADMELOG) 6 Unit(s) SubCutaneous every 6 hours  metoprolol succinate ER 25 milliGRAM(s) Oral daily  midazolam Injectable 4 milliGRAM(s) IV Push once  Nephro-noam 1 Tablet(s) Oral daily  pantoprazole   Suspension 40 milliGRAM(s) Enteral Tube daily  piperacillin/tazobactam IVPB.. 4.5 Gram(s) IV Intermittent every 12 hours  propofol Infusion 40 MICROgram(s)/kG/Min (10.4 mL/Hr) IV Continuous <Continuous>  propofol Injectable 10 milliGRAM(s) IV Push once  sodium chloride 3%  Inhalation 4 milliLiter(s) Inhalation every 6 hours    MEDICATIONS  (PRN):  acetaminophen     Tablet .. 650 milliGRAM(s) Oral every 6 hours PRN Temp greater or equal to 38C (100.4F), Mild Pain (1 - 3)  albuterol/ipratropium for Nebulization 3 milliLiter(s) Nebulizer every 6 hours PRN Shortness of Breath and/or Wheezing  aluminum hydroxide/magnesium hydroxide/simethicone Suspension 30 milliLiter(s) Oral every 4 hours PRN Dyspepsia  dextrose Oral Gel 15 Gram(s) Oral once PRN Blood Glucose LESS THAN 70 milliGRAM(s)/deciliter  melatonin 3 milliGRAM(s) Oral at bedtime PRN Insomnia  ondansetron Injectable 4 milliGRAM(s) IV Push every 8 hours PRN Nausea and/or Vomiting      Allergies    No Known Allergies    Intolerances        REVIEW OF SYSTEMS:  Unable to obtain     Vital Signs Last 24 Hrs  T(C): 36.6 (08 Oct 2024 14:45), Max: 38.2 (08 Oct 2024 09:45)  T(F): 97.8 (08 Oct 2024 14:45), Max: 100.8 (08 Oct 2024 09:45)  HR: 102 (08 Oct 2024 14:45) (89 - 113)  BP: 162/71 (08 Oct 2024 14:45) (135/68 - 204/74)  BP(mean): --  RR: 26 (08 Oct 2024 14:45) (18 - 26)  SpO2: 99% (08 Oct 2024 15:24) (81% - 99%)    Parameters below as of 08 Oct 2024 14:45  Patient On (Oxygen Delivery Method): BiPAP/CPAP        PHYSICAL EXAM:  GENERAL: Ill appearing, moderate respiratory distress   EYES: EOMI   ENMT: dry mucous membranes  NERVOUS SYSTEM:  Awake, lethargic   CHEST/LUNG: Rhonchi bilaterally, belly breathing   HEART: tachy   ABDOMEN: Soft, PEG in place  EXTREMITIES:   No clubbing, cyanosis, or edema      LABS:                        7.5    20.55 )-----------( 288      ( 08 Oct 2024 12:30 )             22.8     10-08    127[L]  |  87[L]  |  99[H]  ----------------------------<  335[H]  3.7   |  27  |  5.83[H]    Ca    9.3      08 Oct 2024 12:30        Urinalysis Basic - ( 08 Oct 2024 12:30 )    Color: x / Appearance: x / SG: x / pH: x  Gluc: 335 mg/dL / Ketone: x  / Bili: x / Urobili: x   Blood: x / Protein: x / Nitrite: x   Leuk Esterase: x / RBC: x / WBC x   Sq Epi: x / Non Sq Epi: x / Bacteria: x      CAPILLARY BLOOD GLUCOSE      POCT Blood Glucose.: 222 mg/dL (08 Oct 2024 14:27)  POCT Blood Glucose.: 188 mg/dL (08 Oct 2024 13:55)  POCT Blood Glucose.: 360 mg/dL (08 Oct 2024 09:11)  POCT Blood Glucose.: 420 mg/dL (08 Oct 2024 06:54)  POCT Blood Glucose.: 448 mg/dL (08 Oct 2024 05:57)  POCT Blood Glucose.: 380 mg/dL (07 Oct 2024 22:48)  POCT Blood Glucose.: 291 mg/dL (07 Oct 2024 20:59)  POCT Blood Glucose.: 221 mg/dL (07 Oct 2024 18:02)  POCT Blood Glucose.: 221 mg/dL (07 Oct 2024 16:52)      RADIOLOGY & ADDITIONAL TESTS:    Imaging Personally Reviewed:  [ X] YES  [ ] NO    Consultant(s) Notes Reviewed:  [ X] YES  [ ] NO    Care Discussed with Consultants/Other Providers [X ] YES  [ ] NO

## 2024-10-08 NOTE — CONSULT NOTE ADULT - SUBJECTIVE AND OBJECTIVE BOX
[facility]  INFECTIOUS DISEASES   [address] [place] [address]  Tel: [phone]     Fax: [phone]  ==============================================================================  DO Artie Aquino MD Sehrish Shahid, MD Alisa Rivera, NP   ==============================================================================    JIMMY BLAND  MRN-[id number]  Male  73y ([birth year])        Patient is a 73y old  Male who presents with a chief complaint of Sepsis and acute hypoxic respiratory failure secondary to suspected aspiration PNA vs. HCAP (08 Oct 2024 18:55)      HPI:  Jimmy Bland is a 73 year old male with PMHx of HTN, IDDM2, ESRD on HD (M/W/F, through RUE AV fistula), hx of CVA x 2 (with residual R. sided weakness and dysphagia s/p PEG tube, previously with tracheostomy and now removed), and hx of RLE DVT (completed apixaban course) who presented to the ED on 10/4/24 for complaints of cough and chills.    History obtained from wife at bedside. As per wife, patient typically coughs at baseline since he previously had a tracheostomy. However, for the past 2-3 days, he has been coughing more frequently and it has been productive with yellow phlegm. Wife was assisting him with getting dressed this afternoon around 1 PM when he felt nauseous and started complaining of abdominal pain. Then had a bowel movement. When wife was cleaning him up, patient was shaking "like he had chills" and she thinks his eyes rolled back for a few seconds. No loss of consciousness. EMS was called and another episode of shaking upon EMS arrival. Of note, patient is due for dialysis today but did not receive it since he came to the ER today. Baseline functional status is wheelchair bound and dependent with all ADLs. NPO with Nepro q4. Lives at home with wife.     In the ED, Tmax 101.3, HR as elevated as 118, BP as elevated as 201/68, and SpO2 as low as 91% on room air. Initially placed on 15L NRB with improvement of SpO2 to 95%. Now on room air. WBC 17.38K, hgb 9.9, sodium 129, BUN 61, Cr 4.05, alkphos 169. Lactic acid 3.6. CT head without evidence of acute hemorrhage mass or mass effect but with encephalomalacia and gliosis again seen involving the bifrontal region. CT CAP with pneumonia and rectum distended with stool. Received  cc bolus, vancomycin 1 g IV, zosyn 3.375 g IV, acetaminophen 1 g IV, hydralazine 10 mg IV, pantoprazole 40 mg IV, and ondansetron 4 mg IV. Evaluated by nephrology who is planning for HD tomorrow. (04 Oct 2024 22:03)      ID consulted for workup and antibiotic management     PAST MEDICAL & SURGICAL HISTORY:  ESRD on hemodialysis      HTN (hypertension)      Insulin dependent type 2 diabetes mellitus      History of cerebrovascular accident (CVA) with residual deficit      History of DVT of lower extremity      Arteriovenous fistula      S/P percutaneous endoscopic gastrostomy (PEG) tube placement      History of tracheostomy          Allergies  No Known Allergies        ANTIMICROBIALS:  piperacillin/tazobactam IVPB.. 4.5 every 12 hours      MEDICATIONS  (STANDING):  piperacillin/tazobactam IVPB..   25 mL/Hr IV Intermittent (10-08-24 @ 14:10)   25 mL/Hr IV Intermittent (10-08-24 @ 03:27)   25 mL/Hr IV Intermittent (10-07-24 @ 14:15)   25 mL/Hr IV Intermittent (10-07-24 @ 03:45)   25 mL/Hr IV Intermittent (10-06-24 @ 14:20)   25 mL/Hr IV Intermittent (10-06-24 @ 03:41)   25 mL/Hr IV Intermittent (10-05-24 @ 18:03)   25 mL/Hr IV Intermittent (10-05-24 @ 02:39)    piperacillin/tazobactam IVPB..   25 mL/Hr IV Intermittent (10-08-24 @ 21:58)    piperacillin/tazobactam IVPB...   200 mL/Hr IV Intermittent (10-04-24 @ 18:32)    vancomycin  IVPB   250 mL/Hr IV Intermittent (10-08-24 @ 10:05)    vancomycin  IVPB.   250 mL/Hr IV Intermittent (10-04-24 @ 20:25)        OTHER MEDS: MEDICATIONS  (STANDING):  acetaminophen     Tablet .. 650 every 6 hours PRN  albuterol/ipratropium for Nebulization 3 every 6 hours  albuterol/ipratropium for Nebulization 3 every 6 hours PRN  aluminum hydroxide/magnesium hydroxide/simethicone Suspension 30 every 4 hours PRN  aspirin  chewable 81 daily  atorvastatin 20 at bedtime  fentaNYL   Infusion. 0.5 <Continuous>  hydrocortisone sodium succinate Injectable 50 every 6 hours  insulin glargine Injectable (LANTUS) 7 at bedtime  insulin lispro (ADMELOG) corrective regimen sliding scale  every 6 hours  melatonin 3 at bedtime PRN  norepinephrine Infusion 0.05 <Continuous>  ondansetron Injectable 4 every 8 hours PRN  pantoprazole   Suspension 40 daily  phenylephrine   100 mCg/mL NaCl 0.9% Injectable 20 once  propofol Infusion 40 <Continuous>  sodium chloride 3%  Inhalation 4 every 6 hours      SOCIAL HISTORY:     denies    FAMILY HISTORY:  FHx: diabetes mellitus (Father, Aunt)        REVIEW OF SYSTEMS  [  ] ROS unobtainable because:    [ x ] All other systems negative except as noted below:	    Constitutional:  [ ] fever [ ] chills  [ ] weight loss  [ ] weakness  Skin:  [ ] rash [ ] phlebitis	  Eyes: [ ] icterus [ ] pain  [ ] discharge	  ENMT: [ ] sore throat  [ ] thrush [ ] ulcers [ ] exudates  Respiratory: [ x] dyspnea [ ] hemoptysis [ x] cough [ ] sputum	  Cardiovascular:  [ ] chest pain [ ] palpitations [ ] edema	  Gastrointestinal:  [ ] nausea [ ] vomiting [ ] diarrhea [ ] constipation [ ] pain	  Genitourinary:  [ ] dysuria [ ] frequency [ ] hematuria [ ] discharge [ ] flank pain  [ ] incontinence  Musculoskeletal:  [ ] myalgias [ ] arthralgias [ ] arthritis  [ ] back pain  Neurological:  [ ] headache [ ] seizures  [ ] confusion/altered mental status  Psychiatric:  [ ] anxiety [ ] depression	  Hematology/Lymphatics:  [ ] lymphadenopathy  Endocrine:  [ ] adrenal [ ] thyroid  Allergic/Immunologic:	 [ ] transplant [ ] seasonal    Vital Signs Last 24 Hrs  T(F): 98.7 (10-08-24 @ 15:45), Max: 101.3 (10-04-24 @ 16:52)    Vital Signs Last 24 Hrs  HR: 88 (10-08-24 @ 21:30) (77 - 109)  BP: 66/22 (10-08-24 @ 19:01) (66/22 - 182/74)  RR: 17 (10-08-24 @ 21:30)  SpO2: 100% (10-08-24 @ 21:30) (82% - 100%)  Wt(kg): --    PHYSICAL EXAM:  Constitutional: toxic, in respiratory distress on HFNC   HEAD/EYES: anicteric, no conjunctival injection  ENT:  supple, no thrush  Cardiovascular:   normal S1, S2, no murmur, no edema  Respiratory:  diminished BS bilaterally, no wheezes, no rales  GI:  soft, non-tender, normal bowel sounds,  :  no belle, no CVA tenderness  Musculoskeletal:  no synovitis,   Neurologic: awake and alert, normal strength, no focal findings  Skin:  no rash, no erythema, no phlebitis  Heme/Onc: no lymphadenopathy   Psychiatric:  awake, alert, appropriate mood        WBC  WBC Count: 32.85 K/uL (10-08 @ 20:10)  WBC Count: 20.55 K/uL (10-08 @ 12:30)  WBC Count: 19.76 K/uL (10-07 @ 18:35)  WBC Count: 14.32 K/uL (10-05 @ 08:37)  WBC Count: 17.38 K/uL (10-04 @ 16:40)      Auto Neutrophil %: 89.2 % *H* (10-08-24 @ 20:10)  Auto Neutrophil #: 29.28 K/uL *H* (10-08-24 @ 20:10)  Auto Neutrophil %: 89.0 % *H* (10-04-24 @ 16:40)  Auto Neutrophil #: 16.16 K/uL *H* (10-04-24 @ 16:40)    CBC                        7.7    32.85 )-----------( 355      ( 08 Oct 2024 20:10 )             23.5     BMP/CMP  10-08    135  |  99  |  47[H]  ----------------------------<  110[H]  3.8   |  28  |  3.28[H]    Ca    9.4      08 Oct 2024 20:10  Phos  0.8     10-08  Mg     2.5     10-08    TPro  7.7  /  Alb  2.8[L]  /  TBili  0.7  /  DBili  x   /  AST  19  /  ALT  17  /  AlkPhos  149[H]  10-08    Creatinine Trend  Creatinine Trend: 3.28<--, 5.83<--, 5.06<--, 4.79<--, 4.05<--        Blood Gas Arterial, Lactate: 1.10 mmol/L (10-08-24 @ 20:21)  Lactate, Blood: 1.2 mmol/L (10-08-24 @ 20:10)  Blood Gas Arterial, Lactate: 1.50 mmol/L (10-08-24 @ 17:16)  Blood Gas Arterial, Lactate: 1.00 mmol/L (10-07-24 @ 18:16)      MICROBIOLOGY:  .Blood BLOOD  10-04-24   No growth at 72 Hours  --  --      .Blood BLOOD  10-04-24   No growth at 72 Hours  --  --        SARS-CoV-2: NotDetec (08 Oct 2024 10:00)  COVID-19 PCR: Detected (09 Jul 2024 13:43)  COVID-19 PCR: NotDetec (07 Jul 2024 04:30)  SARS-CoV-2: NotDetec (29 Jun 2024 22:30)  COVID-19 PCR: NotDetec (14 Jun 2024 18:56)        Rapid RVP Result: NotDetec (10-08 @ 10:00)        RADIOLOGY:    ACC: [id number] EXAM:  CT ABDOMEN AND PELVIS   ORDERED BY: ALCIRA BHATT     ACC: [id number] EXAM:  CT CHEST   ORDERED BY: ALCIRA BHATT     PROCEDURE DATE:  10/04/2024          INTERPRETATION:  CLINICAL INFORMATION: Cough, fever, abdominal pain,   nausea. End-stage renal disease.    COMPARISON: CT abdomen pelvis 5/12/2024.    CONTRAST/COMPLICATIONS:  IV Contrast: None  Oral Contrast: None  Complications: None reported    PROCEDURE:  CT of the Chest, Abdomen and Pelvis was performed.  Sagittal and coronal reformats were performed.    FINDINGS:  Evaluation of the solid organs and vasculature is limited without   intravenous contrast.    CHEST:  LUNGS AND LARGE AIRWAYS: Secretions in the right mainstem and right lower   lobe bronchus. Right lower lobe consolidation and patchy opacities in the   right upper lobe. Left lower lobe calcified granuloma.  PLEURA: No pleural effusion.  VESSELS: Aortic calcifications. Coronary artery calcifications.  HEART: Heart size is normal. No pericardial effusion.  MEDIASTINUM AND LISA: No lymphadenopathy.  CHEST WALL AND LOWER NECK: Within normal limits.    ABDOMEN AND PELVIS:  LIVER: Within normal limits.  BILE DUCTS: Normal caliber.  GALLBLADDER: Within normal limits.  SPLEEN: Within normal limits.  PANCREAS: Within normal limits.  ADRENALS: Within normal limits.  KIDNEYS/URETERS: Atrophic kidneys. No hydronephrosis or calculi.    BLADDER: Within normal limits.  REPRODUCTIVE ORGANS: Prostate is enlarged.    BOWEL: No bowel obstruction. Appendix is normal. PEG tube in place.   Rectum distended with stool.  PERITONEUM/RETROPERITONEUM: Within normal limits.  VESSELS: Atherosclerotic changes.  LYMPH NODES: No lymphadenopathy.  ABDOMINAL WALL: Degenerative changes.  BONES: Degenerative changes.    IMPRESSION:  Pneumonia.    Rectum distended with stool.    MONALISA ESCOBEDO MD  This document has been electronically signed. Oct  4 2024  6:22PM        ACC: [id number] EXAM:  CT BRAIN   ORDERED BY: ALCIRA BHATT     PROCEDURE DATE:  10/04/2024          INTERPRETATION:  CLINICAL INFORMATION: syncope    COMPARISON: Head CT May 1, 2024.    Multiple axial sections were performed from the base of skull to vertex   without contrast enhancement. Coronal and sagittal reconstructions were   performed    Parenchymal volume loss and chronic microvessel ischemic changes are   again seen.    Encephalomalacia and gliosis involving the bifrontal region is again seen.    No acute hemorrhage mass or mass effect is seen    Evaluation of the osseous structures with the appropriate window appears   unremarkable    Right maxillary polyp versus retention cyst is seen. Right ethmoid sinus   mucosal thickening seen    Both mastoid and middle ear regions appear clear.    IMPRESSION: No evidence of acute hemorrhage mass or mass effect    Encephalomalacia and gliosis again seen involving the bifrontal region.    NEISHA CORTEZ MD; Attending Radiologist  This document has been electronically signed. Oct  4 2024  6:03PM    ACC: [id number] EXAM:  CT ANGIO CHEST PULM ART WAWIC   ORDERED BY: SRINIVASA PHILLIPS     PROCEDURE DATE:  10/07/2024          INTERPRETATION:  CLINICAL INFORMATION: tachy ro PE FCT    COMPARISON: 10.4.24.    CONTRAST/COMPLICATIONS:  IV Contrast: Omnipaque 350  96 cc administered   04 cc discarded  Oral Contrast: NONE  Complications: None reported at time of study completion    PROCEDURE:  CT Angiography of the Chest.  Sagittal and coronal reformats were performed as well as 3D (MIP)   reconstructions.    FINDINGS:    LUNGS AND LARGE AIRWAYS: Secretions within the right-sided airways.   Worsening right lower lobe consolidation. There is additional   consolidation in the right middle lobe. There are scattered tree-in-bud   nodules in the remainder of the right lung. No pulmonary nodules.  PLEURA: Trace right pleural effusion.  VESSELS: Right upper extremity graft is noted.  HEART: Heart size is normal.  No pericardial effusion.  MEDIASTINUM AND LISA: No lymphadenopathy.  CHEST WALL AND LOWER NECK: Within normal limits.  UPPER ABDOMEN: Gastrostomy tube noted.  BONES: Within normal limits.    IMPRESSION: Worsening right-sided consolidation with tree-in-bud nodules;   recommend one-month follow-up.    No pulmonary embolus.        MARIE LARSON MD  This document has been electronically signed. Oct  7 2024  8:42PM      I have personally reviewed the above imaging

## 2024-10-08 NOTE — PROCEDURE NOTE - ADDITIONAL PROCEDURE DETAILS
Pre-oxygenated with Bilevel non-invasive. Patient sedated with propofol and versed. VIsualized first time, but cords were not open. Placed oral airway and bagged. Patient easy to bag, but must have de-recruited or plugged during the sedation so bagged until pulse ox came back up to above 90%. Rocuronium given. Visualized second time and intubated successfully with color change on calorimeter, fogging in tube, and bilateral breath sounds present.

## 2024-10-08 NOTE — RAPID RESPONSE TEAM SUMMARY - NSOTHERINTERVENTIONSRRT_GEN_ALL_CORE
chest PT  chest vest  high flow nasal cannula 50 L 100% fio2, sat 93%  pulm consult- seen by Dr. Martinez and Hal NP, POCUS performed, pt to be positioned with R side lung up, pt repositioned.   RVP  stool culture, stool for ova and parasite collected    Time spent 70 min

## 2024-10-08 NOTE — RAPID RESPONSE TEAM SUMMARY - NSSITUATIONBACKGROUNDRRT_GEN_ALL_CORE
Responded to RRT called for hypoxia, satting 80s on HFNC 50L 100% FIO2 during bedside dialysis. Now with accessory muscles in use +dyspnea + abdominal breathing. Pt remains lethargic, responsive to verbal stimuli.    Also present at RRT Hal Ingram NP, pulm
Responded to RRT called for hypoxia, O2 sat on room air 70s, pt had been satting in the 90s on room air previously. Pt alert, responding to verbal stimuli. Baseline mental status per RN at bedside. + Frequent loose BMS.   HPI:  Megha Art is a 73 year old male with PMHx of HTN, IDDM2, ESRD on HD (M/W/F, through RUE AV fistula), hx of CVA x 2 (with residual R. sided weakness and dysphagia s/p PEG tube, previously with tracheostomy and now removed), and hx of RLE DVT (completed apixaban course) who presented to the ED on 10/4/24 for complaints of cough and chills.    History obtained from wife at bedside. As per wife, patient typically coughs at baseline since he previously had a tracheostomy. However, for the past 2-3 days, he has been coughing more frequently and it has been productive with yellow phlegm. Wife was assisting him with getting dressed this afternoon around 1 PM when he felt nauseous and started complaining of abdominal pain. Then had a bowel movement. When wife was cleaning him up, patient was shaking "like he had chills" and she thinks his eyes rolled back for a few seconds. No loss of consciousness. EMS was called and another episode of shaking upon EMS arrival. Of note, patient is due for dialysis today but did not receive it since he came to the ER today. Baseline functional status is wheelchair bound and dependent with all ADLs. NPO with Nepro q4. Lives at home with wife.     In the ED, Tmax 101.3, HR as elevated as 118, BP as elevated as 201/68, and SpO2 as low as 91% on room air. Initially placed on 15L NRB with improvement of SpO2 to 95%. Now on room air. WBC 17.38K, hgb 9.9, sodium 129, BUN 61, Cr 4.05, alkphos 169. Lactic acid 3.6. CT head without evidence of acute hemorrhage mass or mass effect but with encephalomalacia and gliosis again seen involving the bifrontal region. CT CAP with pneumonia and rectum distended with stool. Received  cc bolus, vancomycin 1 g IV, zosyn 3.375 g IV, acetaminophen 1 g IV, hydralazine 10 mg IV, pantoprazole 40 mg IV, and ondansetron 4 mg IV. Evaluated by nephrology who is planning for HD tomorrow. (04 Oct 2024 22:03)    Also present at RRT, hospitalist Dr. Orta
Responded to RRT called for hypoxia, per RN at bedside pt dessated to low 80s on nasal cannula, placed on NRB, O2 sat 76% on 100% NRB mask. Pt lethargic, responsive to verbal stimuli. + multiple loose BMs.  s/p RRT for hypoxia yesterday, 10/7. CTA chest done:  < from: CT Angio Chest PE Protocol w/ IV Cont (10.07.24 @ 20:28) >    Worsening right-sided consolidation with tree-in-bud nodules;   recommend one-month follow-up.    No pulmonary embolus.        < end of copied text >      HPI:  Megha Art is a 73 year old male with PMHx of HTN, IDDM2, ESRD on HD (M/W/F, through RUE AV fistula), hx of CVA x 2 (with residual R. sided weakness and dysphagia s/p PEG tube, previously with tracheostomy and now removed), and hx of RLE DVT (completed apixaban course) who presented to the ED on 10/4/24 for complaints of cough and chills.    History obtained from wife at bedside. As per wife, patient typically coughs at baseline since he previously had a tracheostomy. However, for the past 2-3 days, he has been coughing more frequently and it has been productive with yellow phlegm. Wife was assisting him with getting dressed this afternoon around 1 PM when he felt nauseous and started complaining of abdominal pain. Then had a bowel movement. When wife was cleaning him up, patient was shaking "like he had chills" and she thinks his eyes rolled back for a few seconds. No loss of consciousness. EMS was called and another episode of shaking upon EMS arrival. Of note, patient is due for dialysis today but did not receive it since he came to the ER today. Baseline functional status is wheelchair bound and dependent with all ADLs. NPO with Nepro q4. Lives at home with wife.     In the ED, Tmax 101.3, HR as elevated as 118, BP as elevated as 201/68, and SpO2 as low as 91% on room air. Initially placed on 15L NRB with improvement of SpO2 to 95%. Now on room air. WBC 17.38K, hgb 9.9, sodium 129, BUN 61, Cr 4.05, alkphos 169. Lactic acid 3.6. CT head without evidence of acute hemorrhage mass or mass effect but with encephalomalacia and gliosis again seen involving the bifrontal region. CT CAP with pneumonia and rectum distended with stool. Received  cc bolus, vancomycin 1 g IV, zosyn 3.375 g IV, acetaminophen 1 g IV, hydralazine 10 mg IV, pantoprazole 40 mg IV, and ondansetron 4 mg IV. Evaluated by nephrology who is planning for HD tomorrow. (04 Oct 2024 22:03)    Also present at RRT hospitalist Dr. Kang

## 2024-10-08 NOTE — RAPID RESPONSE TEAM SUMMARY - NSMEDICATIONSRRT_GEN_ALL_CORE
Hydralazine 10 mg IVP
labetalol 10 mg IVP  repeat /71 HR 69  Rectal temp 100.8  IV tylenol 650 mg  duoneb, hypersal nebulized  IV vancomcin 750 mg

## 2024-10-09 LAB
ALBUMIN SERPL ELPH-MCNC: 2.6 G/DL — LOW (ref 3.3–5)
ALP SERPL-CCNC: 131 U/L — HIGH (ref 40–120)
ALT FLD-CCNC: 17 U/L — SIGNIFICANT CHANGE UP (ref 12–78)
ANION GAP SERPL CALC-SCNC: 13 MMOL/L — SIGNIFICANT CHANGE UP (ref 5–17)
ANISOCYTOSIS BLD QL: SIGNIFICANT CHANGE UP
AST SERPL-CCNC: 20 U/L — SIGNIFICANT CHANGE UP (ref 15–37)
B PERT IGG+IGM PNL SER: ABNORMAL
BASOPHILS # BLD AUTO: 0 K/UL — SIGNIFICANT CHANGE UP (ref 0–0.2)
BASOPHILS NFR BLD AUTO: 0 % — SIGNIFICANT CHANGE UP (ref 0–2)
BILIRUB SERPL-MCNC: 0.6 MG/DL — SIGNIFICANT CHANGE UP (ref 0.2–1.2)
BUN SERPL-MCNC: 51 MG/DL — HIGH (ref 7–23)
CALCIUM SERPL-MCNC: 9 MG/DL — SIGNIFICANT CHANGE UP (ref 8.5–10.1)
CHLORIDE SERPL-SCNC: 96 MMOL/L — SIGNIFICANT CHANGE UP (ref 96–108)
CO2 SERPL-SCNC: 25 MMOL/L — SIGNIFICANT CHANGE UP (ref 22–31)
COLOR FLD: SIGNIFICANT CHANGE UP
CREAT SERPL-MCNC: 3.63 MG/DL — HIGH (ref 0.5–1.3)
CULTURE RESULTS: SIGNIFICANT CHANGE UP
EGFR: 17 ML/MIN/1.73M2 — LOW
EOSINOPHIL # BLD AUTO: 0.26 K/UL — SIGNIFICANT CHANGE UP (ref 0–0.5)
EOSINOPHIL NFR BLD AUTO: 1 % — SIGNIFICANT CHANGE UP (ref 0–6)
FLUID INTAKE SUBSTANCE CLASS: SIGNIFICANT CHANGE UP
GAS PNL BLDA: SIGNIFICANT CHANGE UP
GLUCOSE BLDC GLUCOMTR-MCNC: 114 MG/DL — HIGH (ref 70–99)
GLUCOSE BLDC GLUCOMTR-MCNC: 151 MG/DL — HIGH (ref 70–99)
GLUCOSE BLDC GLUCOMTR-MCNC: 172 MG/DL — HIGH (ref 70–99)
GLUCOSE BLDC GLUCOMTR-MCNC: 257 MG/DL — HIGH (ref 70–99)
GLUCOSE BLDC GLUCOMTR-MCNC: 296 MG/DL — HIGH (ref 70–99)
GLUCOSE SERPL-MCNC: 204 MG/DL — HIGH (ref 70–99)
GRAM STN FLD: ABNORMAL
HCT VFR BLD CALC: 22.4 % — LOW (ref 39–50)
HGB BLD-MCNC: 7.3 G/DL — LOW (ref 13–17)
HYPOCHROMIA BLD QL: SIGNIFICANT CHANGE UP
HYPOSEGMENTATION: PRESENT — SIGNIFICANT CHANGE UP
LYMPHOCYTES # BLD AUTO: 1.56 K/UL — SIGNIFICANT CHANGE UP (ref 1–3.3)
LYMPHOCYTES # BLD AUTO: 6 % — LOW (ref 13–44)
LYMPHOCYTES # FLD: 15 % — SIGNIFICANT CHANGE UP
MAGNESIUM SERPL-MCNC: 2.2 MG/DL — SIGNIFICANT CHANGE UP (ref 1.6–2.6)
MANUAL SMEAR VERIFICATION: SIGNIFICANT CHANGE UP
MCHC RBC-ENTMCNC: 21.6 PG — LOW (ref 27–34)
MCHC RBC-ENTMCNC: 32.6 G/DL — SIGNIFICANT CHANGE UP (ref 32–36)
MCV RBC AUTO: 66.3 FL — LOW (ref 80–100)
MICROCYTES BLD QL: SIGNIFICANT CHANGE UP
MONOCYTES # BLD AUTO: 0.52 K/UL — SIGNIFICANT CHANGE UP (ref 0–0.9)
MONOCYTES NFR BLD AUTO: 2 % — SIGNIFICANT CHANGE UP (ref 2–14)
MONOS+MACROS # FLD: 10 % — SIGNIFICANT CHANGE UP
MRSA PCR RESULT.: SIGNIFICANT CHANGE UP
NEUTROPHILS # BLD AUTO: 23.62 K/UL — HIGH (ref 1.8–7.4)
NEUTROPHILS NFR BLD AUTO: 82 % — HIGH (ref 43–77)
NEUTROPHILS-BODY FLUID: 75 % — SIGNIFICANT CHANGE UP
NEUTS BAND # BLD: 9 % — HIGH (ref 0–8)
NIGHT BLUE STAIN TISS: SIGNIFICANT CHANGE UP
NRBC # BLD: 0 /100 WBCS — SIGNIFICANT CHANGE UP (ref 0–0)
NRBC # BLD: SIGNIFICANT CHANGE UP /100 WBCS (ref 0–0)
PHOSPHATE SERPL-MCNC: 3.3 MG/DL — SIGNIFICANT CHANGE UP (ref 2.5–4.5)
PLAT MORPH BLD: NORMAL — SIGNIFICANT CHANGE UP
PLATELET # BLD AUTO: 279 K/UL — SIGNIFICANT CHANGE UP (ref 150–400)
POIKILOCYTOSIS BLD QL AUTO: SIGNIFICANT CHANGE UP
POLYCHROMASIA BLD QL SMEAR: SLIGHT — SIGNIFICANT CHANGE UP
POTASSIUM SERPL-MCNC: 4.3 MMOL/L — SIGNIFICANT CHANGE UP (ref 3.5–5.3)
POTASSIUM SERPL-SCNC: 4.3 MMOL/L — SIGNIFICANT CHANGE UP (ref 3.5–5.3)
PROT SERPL-MCNC: 7.3 GM/DL — SIGNIFICANT CHANGE UP (ref 6–8.3)
RBC # BLD: 3.38 M/UL — LOW (ref 4.2–5.8)
RBC # FLD: 18.6 % — HIGH (ref 10.3–14.5)
RBC BLD AUTO: ABNORMAL
RCV VOL RI: 193 /UL — HIGH (ref 0–0)
S AUREUS DNA NOSE QL NAA+PROBE: SIGNIFICANT CHANGE UP
SODIUM SERPL-SCNC: 134 MMOL/L — LOW (ref 135–145)
SPECIMEN SOURCE: SIGNIFICANT CHANGE UP
SPHEROCYTES BLD QL SMEAR: SIGNIFICANT CHANGE UP
TARGETS BLD QL SMEAR: SIGNIFICANT CHANGE UP
TOTAL NUCLEATED CELL COUNT, BODY FLUID: 1539 /UL — SIGNIFICANT CHANGE UP
TUBE TYPE: SIGNIFICANT CHANGE UP
WBC # BLD: 25.96 K/UL — HIGH (ref 3.8–10.5)
WBC # FLD AUTO: 25.96 K/UL — HIGH (ref 3.8–10.5)

## 2024-10-09 PROCEDURE — 99291 CRITICAL CARE FIRST HOUR: CPT

## 2024-10-09 PROCEDURE — 76604 US EXAM CHEST: CPT | Mod: 26

## 2024-10-09 PROCEDURE — 99233 SBSQ HOSP IP/OBS HIGH 50: CPT

## 2024-10-09 PROCEDURE — 93308 TTE F-UP OR LMTD: CPT | Mod: 26

## 2024-10-09 RX ORDER — HEPARIN SODIUM 10000 [USP'U]/ML
5000 INJECTION INTRAVENOUS; SUBCUTANEOUS EVERY 8 HOURS
Refills: 0 | Status: DISCONTINUED | OUTPATIENT
Start: 2024-10-09 | End: 2024-10-12

## 2024-10-09 RX ORDER — POLYETHYLENE GLYCOL 3350 17 G/17G
17 POWDER, FOR SOLUTION ORAL DAILY
Refills: 0 | Status: DISCONTINUED | OUTPATIENT
Start: 2024-10-09 | End: 2024-10-23

## 2024-10-09 RX ADMIN — PROPOFOL 10.4 MICROGRAM(S)/KG/MIN: 10 INJECTION, EMULSION INTRAVENOUS at 18:12

## 2024-10-09 RX ADMIN — HYDROCORTISONE 50 MILLIGRAM(S): 10 TABLET ORAL at 18:26

## 2024-10-09 RX ADMIN — IPRATROPIUM BROMIDE AND ALBUTEROL SULFATE 3 MILLILITER(S): .5; 2.5 SOLUTION RESPIRATORY (INHALATION) at 11:34

## 2024-10-09 RX ADMIN — PIPERACILLIN AND TAZOBACTAM 25 GRAM(S): .5; 4 INJECTION, POWDER, LYOPHILIZED, FOR SOLUTION INTRAVENOUS at 11:20

## 2024-10-09 RX ADMIN — Medication 2: at 23:10

## 2024-10-09 RX ADMIN — HEPARIN SODIUM 5000 UNIT(S): 10000 INJECTION INTRAVENOUS; SUBCUTANEOUS at 22:11

## 2024-10-09 RX ADMIN — Medication 7 UNIT(S): at 22:11

## 2024-10-09 RX ADMIN — Medication 20 MILLIGRAM(S): at 22:11

## 2024-10-09 RX ADMIN — CHLORHEXIDINE GLUCONATE 15 MILLILITER(S): 40 SOLUTION TOPICAL at 18:18

## 2024-10-09 RX ADMIN — IPRATROPIUM BROMIDE AND ALBUTEROL SULFATE 3 MILLILITER(S): .5; 2.5 SOLUTION RESPIRATORY (INHALATION) at 05:19

## 2024-10-09 RX ADMIN — POLYETHYLENE GLYCOL 3350 17 GRAM(S): 17 POWDER, FOR SOLUTION ORAL at 15:23

## 2024-10-09 RX ADMIN — Medication 6: at 11:23

## 2024-10-09 RX ADMIN — Medication 2: at 18:16

## 2024-10-09 RX ADMIN — PROPOFOL 10.4 MICROGRAM(S)/KG/MIN: 10 INJECTION, EMULSION INTRAVENOUS at 08:20

## 2024-10-09 RX ADMIN — PROPOFOL 10.4 MICROGRAM(S)/KG/MIN: 10 INJECTION, EMULSION INTRAVENOUS at 06:36

## 2024-10-09 RX ADMIN — PANTOPRAZOLE SODIUM 40 MILLIGRAM(S): 40 TABLET, DELAYED RELEASE ORAL at 11:20

## 2024-10-09 RX ADMIN — HYDROCORTISONE 50 MILLIGRAM(S): 10 TABLET ORAL at 11:22

## 2024-10-09 RX ADMIN — HYDROCORTISONE 50 MILLIGRAM(S): 10 TABLET ORAL at 23:00

## 2024-10-09 RX ADMIN — SODIUM CHLORIDE 4 MILLILITER(S): 9 INJECTION, SOLUTION INTRAMUSCULAR; INTRAVENOUS; SUBCUTANEOUS at 11:34

## 2024-10-09 RX ADMIN — IPRATROPIUM BROMIDE AND ALBUTEROL SULFATE 3 MILLILITER(S): .5; 2.5 SOLUTION RESPIRATORY (INHALATION) at 17:08

## 2024-10-09 RX ADMIN — HEPARIN SODIUM 5000 UNIT(S): 10000 INJECTION INTRAVENOUS; SUBCUTANEOUS at 14:21

## 2024-10-09 RX ADMIN — SODIUM CHLORIDE 4 MILLILITER(S): 9 INJECTION, SOLUTION INTRAMUSCULAR; INTRAVENOUS; SUBCUTANEOUS at 05:19

## 2024-10-09 RX ADMIN — Medication 2.18 MICROGRAM(S)/KG/HR: at 08:20

## 2024-10-09 RX ADMIN — SODIUM CHLORIDE 4 MILLILITER(S): 9 INJECTION, SOLUTION INTRAMUSCULAR; INTRAVENOUS; SUBCUTANEOUS at 17:08

## 2024-10-09 RX ADMIN — Medication 81 MILLIGRAM(S): at 11:20

## 2024-10-09 RX ADMIN — Medication 300 MILLIGRAM(S): at 11:20

## 2024-10-09 RX ADMIN — Medication 1 TABLET(S): at 11:22

## 2024-10-09 RX ADMIN — CHLORHEXIDINE GLUCONATE 1 APPLICATION(S): 40 SOLUTION TOPICAL at 05:22

## 2024-10-09 RX ADMIN — Medication 6: at 05:21

## 2024-10-09 RX ADMIN — NOREPINEPHRINE BITARTRATE 2.1 MICROGRAM(S)/KG/MIN: 1 INJECTION, SOLUTION, CONCENTRATE INTRAVENOUS at 08:21

## 2024-10-09 RX ADMIN — PROPOFOL 10.4 MICROGRAM(S)/KG/MIN: 10 INJECTION, EMULSION INTRAVENOUS at 19:41

## 2024-10-09 RX ADMIN — NOREPINEPHRINE BITARTRATE 2.1 MICROGRAM(S)/KG/MIN: 1 INJECTION, SOLUTION, CONCENTRATE INTRAVENOUS at 19:41

## 2024-10-09 RX ADMIN — CHLORHEXIDINE GLUCONATE 15 MILLILITER(S): 40 SOLUTION TOPICAL at 05:22

## 2024-10-09 RX ADMIN — PIPERACILLIN AND TAZOBACTAM 25 GRAM(S): .5; 4 INJECTION, POWDER, LYOPHILIZED, FOR SOLUTION INTRAVENOUS at 22:33

## 2024-10-09 RX ADMIN — HYDROCORTISONE 50 MILLIGRAM(S): 10 TABLET ORAL at 05:22

## 2024-10-09 RX ADMIN — Medication 2.18 MICROGRAM(S)/KG/HR: at 19:41

## 2024-10-09 NOTE — PROGRESS NOTE ADULT - ASSESSMENT
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    Neuro: Follos commands. Comfortable on prop and fent to Rass -1    CV: Septic SHock. Pocus with Euvolemia to hypovolemia  - Titrate levo to map >65.   Respiratory: Acute hypoxic respiratory failure requiring intubation. with RLL pneumonia, likely aspiration. S/p bronchoscopy after intubation with exceedingly high amount of thick white secretions in R mainstem, R lower and R middle lobes. BAL samples sent. Continue zosyn dose increased per ID, GNR in sputum pending s/s. hydrocortisone 50mg q6hrs for severe pneumonia per [facility] trial.  GI: Continue feeds via PEG.  REnal: Hx ESRD on HD. No volume removal today.   Endocrine: Hx DM. ISS and FS q6hrs. Getting lantus 7.   ID: RLL PNA. Likely aspiration. F/u BAL cx. Continue zosyn dose increased GNR in BAL.   Ethics: Full code has been trached before pt was awake alert and able to communicate prior to acute event and wanted full code again this time around.   explained to family that he is likely to be weaker again post and may need re-trach.

## 2024-10-09 NOTE — CHART NOTE - NSCHARTNOTEFT_GEN_A_CORE
Assessment:  Pt seen for ICU adm on 10/08 from 2C unit.  Pt is on vent, receiving HD, NGT feeding was not resumed due to concerns of aspiration pneumonia, c acute respiratory failure requiring intubation, RLL pneumonia, likely aspiration, s/p bronchoscopy,   Pt adm c diagnosis of pneumonia, ESRD on HD.  PMH include DVT, CVA, DM Type 2 on insulin, HTN, ESRD on HD, dysphagia, PEG, bolus feeding regimen c Nepro with carb steady 237 mL q 6=5010 ml Pcupt=2952 calories, 115 grams protein, 1033 ml free water which is excessive protein-energy intake as per estimated needs on 10/06.    Factors impacting intake: [ ] none [ ] nausea  [ ] vomiting [ ] diarrhea [ ] constipation  [ ]chewing problems [x ] swallowing issues  [x ] other: acute illness, on G-Tube feeding regimen      Diet Prescription: Diet, NPO with Tube Feed:   Tube Feeding Modality: Gastrostomy  Nepro with Carb Steady  Total Volume for 24 Hours (mL): 948  Bolus  Total Volume of Bolus (mL):  237  Total # of Feeds: 4  Tube Feed Frequency: Every 6 hours   Tube Feed Start Time: 14:00  Bolus Feed Rate (mL per Hour): 237   Bolus Feed Duration (in Hours): 1  Free Water Flush  Bolus   Total Volume per Flush (mL): 150   Frequency: Every 6 Hours   Total Daily Volume of Flush (mL): 600  Free Water Flush Instructions:  Hold H2O flushes until Na levels WDL; then add H2O flushes of 150 ml q 6 hours  Liquid Protein Supplement     Qty per Day:  1 (10-06-24 @ 12:21)    Intake: 948 ml Ghbzy=5487 calories, 77 grams protein, 689 ml free water   + Liquid protein supplement 1 pkg=15 grams protein, 100 calories for a total of 1806 calories and 92 grams protein from enteral feeding and LPS  + if water flushes are provided as per order above=600 ml daily for a total of 1298 ml free water from feeding and water flushes    Current Weight: 10/09, 43 kg, 10/05, 43.5 kg, c 0.5 wt. loss since adm  % Weight Change: 1.14%  No edema noted   I/O: 1167/1000(no urine output noted)+167      Pertinent Medications: MEDICATIONS  (STANDING):  albuterol/ipratropium for Nebulization 3 milliLiter(s) Nebulizer every 6 hours  aspirin  chewable 81 milliGRAM(s) Enteral Tube daily  atorvastatin 20 milliGRAM(s) Oral at bedtime  chlorhexidine 0.12% Liquid 15 milliLiter(s) Oral Mucosa every 12 hours  chlorhexidine 2% Cloths 1 Application(s) Topical <User Schedule>  fentaNYL   Infusion. 0.5 MICROgram(s)/kG/Hr (2.18 mL/Hr) IV Continuous <Continuous>  ferrous    sulfate Liquid 300 milliGRAM(s) Enteral Tube daily  heparin   Injectable 5000 Unit(s) SubCutaneous every 8 hours  hydrocortisone sodium succinate Injectable 50 milliGRAM(s) IV Push every 6 hours  insulin glargine Injectable (LANTUS) 7 Unit(s) SubCutaneous at bedtime  insulin lispro (ADMELOG) corrective regimen sliding scale   SubCutaneous every 6 hours  Nephro-noam 1 Tablet(s) Oral daily  norepinephrine Infusion 0.05 MICROgram(s)/kG/Min (2.1 mL/Hr) IV Continuous <Continuous>  pantoprazole   Suspension 40 milliGRAM(s) Enteral Tube daily  piperacillin/tazobactam IVPB.. 4.5 Gram(s) IV Intermittent every 12 hours  polyethylene glycol 3350 17 Gram(s) Oral daily  propofol Infusion 40 MICROgram(s)/kG/Min (10.4 mL/Hr) IV Continuous <Continuous>  sodium chloride 3%  Inhalation 4 milliLiter(s) Inhalation every 6 hours    MEDICATIONS  (PRN):  sodium chloride 0.9% lock flush 10 milliLiter(s) IV Push every 1 hour PRN Pre/post blood products, medications, blood draw, and to maintain line patency    Pertinent Labs: 10-09 Na134 mmol/L[L] Glu 204 mg/dL[H] K+ 4.3 mmol/L Cr  3.63 mg/dL[H] BUN 51 mg/dL[H] 10-09 Phos 3.3 mg/dL 10-09 Alb 2.6 g/dL[L] 10-05 Chol 89 mg/dL LDL --    HDL 60 mg/dL Trig 45 mg/dL10-09 ALT 17 U/L AST 20 U/L Alkaline Phosphatase 131 U/L[H]  10-05-24 @ 08:37 a1c 4.8 WNL   CAPILLARY BLOOD GLUCOSE      POCT Blood Glucose.: 296 mg/dL (09 Oct 2024 11:15)  POCT Blood Glucose.: 257 mg/dL (09 Oct 2024 05:11)  POCT Blood Glucose.: 144 mg/dL (08 Oct 2024 23:22)  POCT Blood Glucose.: 127 mg/dL (08 Oct 2024 21:54)  POCT Blood Glucose.: 107 mg/dL (08 Oct 2024 18:55)  POCT Blood Glucose.: 222 mg/dL (08 Oct 2024 14:27)    Skin:   No pressure ulcer noted    Estimated Needs:   [ x] no change since previous assessment(10/06)  [ ] recalculated:     Previous Nutrition Diagnosis: (10/06)  Malnutrition: Severe Malnutrition in context of chronic illness  Etiology: Inadequate energy/protein intake + increased needs related to ESRD on HD, CVA x 2, hx of dysphagia, recurrent aspiration PNA  Signs/Symptoms: Physical findings of severe fat/muscle loss as noted; 12.7% wt loss x 5 months  Goal/Expected Outcome: Pt to meet >75% energy/protein needs via tube feeding & supplement during LOS; met   Nutrition Diagnosis is [x ] ongoing  [ ] resolved [ ] not applicable       New Nutrition Diagnosis: [x ] not applicable     Interventions:   Recommend  [ ] Change Diet To:  [ ] Nutrition Supplement  [x ] Nutrition Support; enteral feeding as tolerated or consider change to ICU 18 hour protocol c Nepro @ 40 ml/hr and increase by 5 ml q 8 hours to goal of Nepro @ 55 ml/hr x 18 hours=900 ml, 1782 calories, 73 grams protein, 654 ml free water +  Liquid protein supplement 1 pkg=15 grams protein, 100 calories for a total of 1882 calories, 88 grams protein from enteral feeding and LPS.  [x ] Other: add 100 ml free water flush q 8 hours when Na WNL    Monitoring and Evaluation:   [ ] PO intake [ x ] Tolerance to diet prescription [ x ] weights [ x ] labs[ x ] follow up per protocol  [ x] other: I/O

## 2024-10-09 NOTE — PROGRESS NOTE ADULT - SUBJECTIVE AND OBJECTIVE BOX
[facility]  INFECTIOUS DISEASES   [address] [place] [address]  Tel: [phone]     Fax: [phone]  ==============================================================================  DO Artie Aquino MD Alexandra Gutman, NP   ==============================================================================      JIMMY BLAND  MRN-[id number]  73y ([birth year])      Interval History: patient seen and examined. he was brought to the ICU and intubated   bronch was done with mucous plugging and secretions which were washed and sent for culture   getting hemodialysis again today     ROS:    [x ] Unobtainable because: intubated and sedated   [ ] All other systems negative except as noted    Constitutional: no fever, no chills  Head: no trauma  Eyes: no vision changes, no eye pain  ENT:  no sore throat, no rhinorrhea  Cardiovascular:  no chest pain, no palpitation  Respiratory:  no SOB, no cough  GI:  no abd pain, no vomiting, no diarrhea  urinary: no dysuria, no hematuria, no flank pain  musculoskeletal:  no joint pain, no joint swelling  skin:  no rash  neurology:  no headache, no seizure, no change in mental status  psych: no anxiety, no depression         Allergies  No Known Allergies        ANTIMICROBIALS:  piperacillin/tazobactam IVPB.. 4.5 every 12 hours      OTHER MEDS:  albuterol/ipratropium for Nebulization 3 milliLiter(s) Nebulizer every 6 hours  aspirin  chewable 81 milliGRAM(s) Enteral Tube daily  atorvastatin 20 milliGRAM(s) Oral at bedtime  chlorhexidine 0.12% Liquid 15 milliLiter(s) Oral Mucosa every 12 hours  chlorhexidine 2% Cloths 1 Application(s) Topical <User Schedule>  fentaNYL   Infusion. 0.5 MICROgram(s)/kG/Hr IV Continuous <Continuous>  ferrous    sulfate Liquid 300 milliGRAM(s) Enteral Tube daily  heparin   Injectable 5000 Unit(s) SubCutaneous every 8 hours  hydrocortisone sodium succinate Injectable 50 milliGRAM(s) IV Push every 6 hours  insulin glargine Injectable (LANTUS) 7 Unit(s) SubCutaneous at bedtime  insulin lispro (ADMELOG) corrective regimen sliding scale   SubCutaneous every 6 hours  Nephro-noam 1 Tablet(s) Oral daily  norepinephrine Infusion 0.05 MICROgram(s)/kG/Min IV Continuous <Continuous>  pantoprazole   Suspension 40 milliGRAM(s) Enteral Tube daily  polyethylene glycol 3350 17 Gram(s) Oral daily  propofol Infusion 40 MICROgram(s)/kG/Min IV Continuous <Continuous>  sodium chloride 0.9% lock flush 10 milliLiter(s) IV Push every 1 hour PRN  sodium chloride 3%  Inhalation 4 milliLiter(s) Inhalation every 6 hours      Physical Exam:  Vital Signs Last 24 Hrs  T(C): 35.6 (10-09-24 @ 15:32), Max: 36.6 (10-08-24 @ 20:00)  HR: 75 (10-09-24 @ 16:49) (68 - 97)  BP: 160/36 (10-09-24 @ 14:00) (66/22 - 160/36)  RR: 18 (10-09-24 @ 15:32) (13 - 26)  SpO2: 98% (10-09-24 @ 16:49) (80% - 100%)          General:    NAD, intubated   Head: atraumatic, normocephalic  Eye: normal sclera and conjunctiva  ENT:    no oral lesions, neck supple, + IJ CVC c.d.i  Cardio:     regular S1, S2,  no murmur  Respiratory:    diminished BS b/l,    no wheezing, +ETT  abd:     soft,   BS +,   no tenderness, + peg   :   no CVAT,  no suprapubic tenderness,    Musculoskeletal:   no joint swelling,   no edema  vascular: +central lines, +PIV, RUE AV fistula   Skin:    no rash  Neurologic:     intubated and sedated   psych: normal affect                              7.3    25.96 )-----------( 279      ( 09 Oct 2024 02:30 )             22.4     10-09    134[L]  |  96  |  51[H]  ----------------------------<  204[H]  4.3   |  25  |  3.63[H]    Ca    9.0      09 Oct 2024 02:30  Phos  3.3     10-09  Mg     2.2     10-09    TPro  7.3  /  Alb  2.6[L]  /  TBili  0.6  /  DBili  x   /  AST  20  /  ALT  17  /  AlkPhos  131[H]  10-09    ABG - ( 09 Oct 2024 04:59 )  pH, Arterial: 7.41  pH, Blood: x     /  pCO2: 41    /  pO2: 120   / HCO3: 26    / Base Excess: 1.2   /  SaO2: 97.9        LIVER FUNCTIONS - ( 09 Oct 2024 02:30 )  Alb: 2.6 g/dL / Pro: 7.3 gm/dL / ALK PHOS: 131 U/L / ALT: 17 U/L / AST: 20 U/L / GGT: x           Creatinine Trend: 3.63<--, 3.28<--, 5.83<--, 5.06<--, 4.79<--, 4.05<--  Mode: AC/ CMV (Assist Control/ Continuous Mandatory Ventilation)  RR (machine): 16  TV (machine): 400  FiO2: 30  PEEP: 8  ITime: 1  MAP: 11  PIP: 21            Creatinine Trend: 2.32<--, 3.63<--, 3.28<--, 5.83<--, 5.06<--, 4.79<--  Blood Gas Arterial, Lactate: 2.40 mmol/L (10-09-24 @ 04:59)  Blood Gas Arterial, Lactate: 1.10 mmol/L (10-08-24 @ 20:21)  Lactate, Blood: 1.2 mmol/L (10-08-24 @ 20:10)  Blood Gas Arterial, Lactate: 1.50 mmol/L (10-08-24 @ 17:16)      MICROBIOLOGY:  v  Bronchial  10-08-24   Few Pseudomonas aeruginosa  --    Moderate polymorphonuclear leukocytes seen per low power field  Few Squamous epithelial cells seen per low power field  Few Gram Negative Rods seen per oil power field      .Stool  10-08-24   No Protozoa seen by trichrome stain  No Helminths or Protozoa seen in formalin concentrate  performed by iodine stain  (routine O+P not evaluated for Microsporidia,  Cryptosporidia or Cyclospora)  One negative sample does not necessarily rule  out the presence of a parasitic infection.  --  --      .Blood BLOOD  10-07-24   No growth at 48 Hours  --  --      .Blood BLOOD  10-07-24   No growth at 48 Hours  --  --      .Blood BLOOD  10-04-24   No growth at 5 days  --  --      .Blood BLOOD  10-04-24   No growth at 5 days  --  --            Rapid RVP Result: NotDetec (10-08 @ 10:00)        Procalcitonin: 27.40 (10-07-24 @ 18:35)  Procalcitonin: 22.50 (10-05-24 @ 08:37)    Rapid RVP Result: NotDetec (10-08-24 @ 10:00)  SARS-CoV-2: NotDetec (10-08-24 @ 10:00)        RADIOLOGY:    ACC: [id number] EXAM:  XR CHEST PORTABLE URGENT 1V   ORDERED BY: DARIANA HOPKINS     PROCEDURE DATE:  10/08/2024          INTERPRETATION:  Portable AP chest radiograph    COMPARISON: 10/8/2024 and 10/7/2024 chest and CT the chest imaging..    CLINICAL INFORMATION: Line placement.    FINDINGS:  CATHETERS AND TUBES: RIGHT IJ catheter tip in SVC.    PULMONARY: Elevated RIGHT hemidiaphragm Elevated RIGHT hemidiaphragm with   RIGHT perihilar/lower zone airspace disease.  LEFT lung parenchyma clear.      HEART/VASCULAR: The heart size and mediastinum configuration are within   the limits of normal.    BONES: The visualized osseous thorax is intact.    IMPRESSION: RIGHT IJ catheter tip in SVC.  Elevated RIGHT hemidiaphragm with RIGHT perihilar/lower zone airspace   disease.  LEFT lung parenchyma clear.        ITALIA MICHELLE MD  This document has been electronically signed. Oct  9 2024  1:19PM      ACC: [id number] EXAM:  XR CHEST PORTABLE URGENT 1V   ORDERED BY: PRASAD SPENCER     PROCEDURE DATE:  10/08/2024          INTERPRETATION:  Portable AP chest radiograph    COMPARISON: 10/4/2024 chest x-ray.    CLINICAL INFORMATION: Status post bronchoscopy. RIGHT lower lobe   pneumonia..    FINDINGS:  CATHETERS AND TUBES: ET tube tip above tracheal bifurcation.    PULMONARY:  Residual RIGHT perihilar/lower zone and diffuse airspace disease .  LEFT lung parenchyma clear.  No pneumothorax.      HEART/VASCULAR: The heart size and mediastinum configuration are within   the limits of normal.    BONES: The visualized osseous thorax is intact.    IMPRESSION:  ET tube tip above the bifurcation.  Residual RIGHT perihilar/lower zone diffuse airspace disease .      ITALIA MICHELLE MD  This document has been electronically signed. Oct  9 2024  1:55PM   [facility]

## 2024-10-09 NOTE — PROGRESS NOTE ADULT - SUBJECTIVE AND OBJECTIVE BOX
[facility] NEPHROLOGY SERVICES, [facility]  NEPHROLOGY AND HYPERTENSION  [address]  [address]  [place] [address]  [phone]    MD RONNA CHANG MD YELENA ROSENBERG, MD BINNY KOSHY, MD CHRISTOPHER CAPUTO, MD EDWARD BOVER, MD          Patient events noted  No distress      MEDICATIONS  (STANDING):  albuterol/ipratropium for Nebulization 3 milliLiter(s) Nebulizer every 6 hours  aspirin  chewable 81 milliGRAM(s) Enteral Tube daily  atorvastatin 20 milliGRAM(s) Oral at bedtime  chlorhexidine 0.12% Liquid 15 milliLiter(s) Oral Mucosa every 12 hours  chlorhexidine 2% Cloths 1 Application(s) Topical <User Schedule>  fentaNYL   Infusion. 0.5 MICROgram(s)/kG/Hr (2.18 mL/Hr) IV Continuous <Continuous>  ferrous    sulfate Liquid 300 milliGRAM(s) Enteral Tube daily  heparin   Injectable 5000 Unit(s) SubCutaneous every 8 hours  hydrocortisone sodium succinate Injectable 50 milliGRAM(s) IV Push every 6 hours  insulin glargine Injectable (LANTUS) 7 Unit(s) SubCutaneous at bedtime  insulin lispro (ADMELOG) corrective regimen sliding scale   SubCutaneous every 6 hours  Nephro-noam 1 Tablet(s) Oral daily  norepinephrine Infusion 0.05 MICROgram(s)/kG/Min (2.1 mL/Hr) IV Continuous <Continuous>  pantoprazole   Suspension 40 milliGRAM(s) Enteral Tube daily  piperacillin/tazobactam IVPB.. 4.5 Gram(s) IV Intermittent every 12 hours  polyethylene glycol 3350 17 Gram(s) Oral daily  propofol Infusion 40 MICROgram(s)/kG/Min (10.4 mL/Hr) IV Continuous <Continuous>  sodium chloride 3%  Inhalation 4 milliLiter(s) Inhalation every 6 hours    MEDICATIONS  (PRN):  sodium chloride 0.9% lock flush 10 milliLiter(s) IV Push every 1 hour PRN Pre/post blood products, medications, blood draw, and to maintain line patency      10-08-24 @ 07:01  -  10-09-24 @ 07:00  --------------------------------------------------------  IN: 1166.5 mL / OUT: 1000 mL / NET: 166.5 mL    10-09-24 @ 07:01  -  10-09-24 @ 17:53  --------------------------------------------------------  IN: 169.8 mL / OUT: 0 mL / NET: 169.8 mL      PHYSICAL EXAM:      T(C): 35.6 (10-09-24 @ 15:32), Max: 36.6 (10-08-24 @ 20:00)  HR: 75 (10-09-24 @ 16:49) (68 - 97)  BP: 160/36 (10-09-24 @ 14:00) (66/22 - 160/36)  RR: 18 (10-09-24 @ 15:32) (13 - 26)  SpO2: 98% (10-09-24 @ 16:49) (80% - 100%)  Wt(kg): --  Lungs clear decreased BS at bases   Heart S1S2  Abd soft NT ND  Extremities:   tr edema                                    7.3    25.96 )-----------( 279      ( 09 Oct 2024 02:30 )             22.4     10-09    134[L]  |  96  |  51[H]  ----------------------------<  204[H]  4.3   |  25  |  3.63[H]    Ca    9.0      09 Oct 2024 02:30  Phos  3.3     10-09  Mg     2.2     10-09    TPro  7.3  /  Alb  2.6[L]  /  TBili  0.6  /  DBili  x   /  AST  20  /  ALT  17  /  AlkPhos  131[H]  10-09    ABG - ( 09 Oct 2024 04:59 )  pH, Arterial: 7.41  pH, Blood: x     /  pCO2: 41    /  pO2: 120   / HCO3: 26    / Base Excess: 1.2   /  SaO2: 97.9              LIVER FUNCTIONS - ( 09 Oct 2024 02:30 )  Alb: 2.6 g/dL / Pro: 7.3 gm/dL / ALK PHOS: 131 U/L / ALT: 17 U/L / AST: 20 U/L / GGT: x           Creatinine Trend: 3.63<--, 3.28<--, 5.83<--, 5.06<--, 4.79<--, 4.05<--  Mode: AC/ CMV (Assist Control/ Continuous Mandatory Ventilation)  RR (machine): 16  TV (machine): 400  FiO2: 30  PEEP: 8  ITime: 1  MAP: 11  PIP: 21        Assessment   ESRD, PNA, left pleural effusion  Respiratory failure     Plan:  HD for today;   UF zero as per PCCM   Intubated      Delonte Moya MD

## 2024-10-09 NOTE — CHART NOTE - NSCHARTNOTEFT_GEN_A_CORE
: Chidi    INDICATION: Shock, hypoxia    PROCEDURE:  [x] LIMITED ECHO  [x] LIMITED CHEST  [ ] LIMITED RETROPERITONEAL  [ ] LIMITED ABDOMINAL  [ ] LIMITED DVT  [ ] NEEDLE GUIDANCE VASCULAR  [ ] NEEDLE GUIDANCE THORACENTESIS  [ ] NEEDLE GUIDANCE PARACENTESIS  [ ] NEEDLE GUIDANCE PERICARDIOCENTESIS  [ ] OTHER    FINDINGS:  A-lines anterior with patches of Blines and very small posterior consolidation on right, small patch of posteriro plines on left.   Hyperdynamic LVF with IVC on small side  INTERPRETATION:  Septic shock, likely volume tolerant.   POCUS indicates significantly improved lung aeration.

## 2024-10-09 NOTE — PROGRESS NOTE ADULT - SUBJECTIVE AND OBJECTIVE BOX
CHIEF COMPLAINT:Patient is a 73y old  Male who presents with a chief complaint of Sepsis and acute hypoxic respiratory failure secondary to suspected aspiration PNA vs. HCAP (08 Oct 2024 23:02)        Interval Events:    REVIEW OF SYSTEMS: No pain no sob. though difficult to asses able to shcke his head.   [x] All other systems negative  [ ] Unable to assess ROS because ________    OBJECTIVE:  ICU Vital Signs Last 24 Hrs  T(C): 35.6 (09 Oct 2024 15:32), Max: 36.6 (08 Oct 2024 20:00)  T(F): 96.1 (09 Oct 2024 15:32), Max: 97.9 (08 Oct 2024 23:54)  HR: 75 (09 Oct 2024 16:49) (68 - 101)  BP: 160/36 (09 Oct 2024 14:00) (66/22 - 160/36)  BP(mean): 69 (09 Oct 2024 14:00) (29 - 130)  ABP: 165/58 (09 Oct 2024 15:32) (102/46 - 186/73)  ABP(mean): 101 (09 Oct 2024 14:00) (62 - 112)  RR: 18 (09 Oct 2024 15:32) (13 - 26)  SpO2: 98% (09 Oct 2024 16:49) (80% - 100%)    O2 Parameters below as of 09 Oct 2024 15:32  Patient On (Oxygen Delivery Method): ventilator          Mode: AC/ CMV (Assist Control/ Continuous Mandatory Ventilation), RR (machine): 16, TV (machine): 400, FiO2: 30, PEEP: 8, ITime: 1, MAP: 11, PIP: 21    10-08 @ 07:01  -  10-09 @ 07:00  --------------------------------------------------------  IN: 1166.5 mL / OUT: 1000 mL / NET: 166.5 mL    10-09 @ 07:01  -  10-09 @ 17:12  --------------------------------------------------------  IN: 169.8 mL / OUT: 0 mL / NET: 169.8 mL      CAPILLARY BLOOD GLUCOSE      POCT Blood Glucose.: 296 mg/dL (09 Oct 2024 11:15)      PHYSICAL EXAM:  General: NAD comfortabtle   HEENT: RETT and NGT inplace.   Neck: No JVD  Respiratory: CTA b/l..  Cardiovascular: S1 S2 RRR  Abdomen: Sof NT ND  Extremities: No edema or rash, R AVF  Skin: No wounds  Neurological:Able to follow despite sedations  Psychiatry:    LINES:    HOSPITAL MEDICATIONS:  Standing Meds:  albuterol/ipratropium for Nebulization 3 milliLiter(s) Nebulizer every 6 hours  aspirin  chewable 81 milliGRAM(s) Enteral Tube daily  atorvastatin 20 milliGRAM(s) Oral at bedtime  chlorhexidine 0.12% Liquid 15 milliLiter(s) Oral Mucosa every 12 hours  chlorhexidine 2% Cloths 1 Application(s) Topical <User Schedule>  fentaNYL   Infusion. 0.5 MICROgram(s)/kG/Hr IV Continuous <Continuous>  ferrous    sulfate Liquid 300 milliGRAM(s) Enteral Tube daily  heparin   Injectable 5000 Unit(s) SubCutaneous every 8 hours  hydrocortisone sodium succinate Injectable 50 milliGRAM(s) IV Push every 6 hours  insulin glargine Injectable (LANTUS) 7 Unit(s) SubCutaneous at bedtime  insulin lispro (ADMELOG) corrective regimen sliding scale   SubCutaneous every 6 hours  Nephro-noam 1 Tablet(s) Oral daily  norepinephrine Infusion 0.05 MICROgram(s)/kG/Min IV Continuous <Continuous>  pantoprazole   Suspension 40 milliGRAM(s) Enteral Tube daily  piperacillin/tazobactam IVPB.. 4.5 Gram(s) IV Intermittent every 12 hours  polyethylene glycol 3350 17 Gram(s) Oral daily  propofol Infusion 40 MICROgram(s)/kG/Min IV Continuous <Continuous>  sodium chloride 3%  Inhalation 4 milliLiter(s) Inhalation every 6 hours      PRN Meds:  sodium chloride 0.9% lock flush 10 milliLiter(s) IV Push every 1 hour PRN      LABS:                        7.3    25.96 )-----------( 279      ( 09 Oct 2024 02:30 )             22.4     Hgb Trend: 7.3<--, 7.7<--, 7.5<--, 10.1<--, 7.9<--  10-09    134[L]  |  96  |  51[H]  ----------------------------<  204[H]  4.3   |  25  |  3.63[H]    Ca    9.0      09 Oct 2024 02:30  Phos  3.3     10-09  Mg     2.2     10-09    TPro  7.3  /  Alb  2.6[L]  /  TBili  0.6  /  DBili  x   /  AST  20  /  ALT  17  /  AlkPhos  131[H]  10-09    Creatinine Trend: 3.63<--, 3.28<--, 5.83<--, 5.06<--, 4.79<--, 4.05<--    Urinalysis Basic - ( 09 Oct 2024 02:30 )    Color: x / Appearance: x / SG: x / pH: x  Gluc: 204 mg/dL / Ketone: x  / Bili: x / Urobili: x   Blood: x / Protein: x / Nitrite: x   Leuk Esterase: x / RBC: x / WBC x   Sq Epi: x / Non Sq Epi: x / Bacteria: x      Arterial Blood Gas:  10-09 @ 04:59  7.41/41/120/26/97.9/1.2  ABG lactate: --  Arterial Blood Gas:  10-08 @ 20:21  7.42/44/89/28/97.9/3.6  ABG lactate: --  Arterial Blood Gas:  10-08 @ 17:16  7.51/38/63/30/93.8/6.8  ABG lactate: --  Arterial Blood Gas:  10-07 @ 18:16  7.48/38/52/28/89.0/4.5  ABG lactate: --        MICROBIOLOGY:     Culture - Bronchial (collected 08 Oct 2024 20:40)  Source: Bronchial  Gram Stain (09 Oct 2024 05:01):    Moderate polymorphonuclear leukocytes seen per low power field    Few Squamous epithelial cells seen per low power field    Few Gram Negative Rods seen per oil power field    Culture - Acid Fast - Bronchial w/Smear (collected 08 Oct 2024 20:40)  Source: Bronchial    Culture - Fungal, Bronchial (collected 08 Oct 2024 20:40)  Source: Bronchial  Preliminary Report (09 Oct 2024 06:55):    Testing in progress    Culture - Blood (collected 07 Oct 2024 18:35)  Source: .Blood BLOOD  Preliminary Report (09 Oct 2024 01:02):    No growth at 24 hours    Culture - Blood (collected 07 Oct 2024 18:30)  Source: .Blood BLOOD  Preliminary Report (09 Oct 2024 01:02):    No growth at 24 hours      RADIOLOGY:  [ ] Reviewed and interpreted by me    EKG:

## 2024-10-09 NOTE — PROGRESS NOTE ADULT - ASSESSMENT
Megha Art is a 73 year old male with history of HTN, IDDM2, ESRD on HD (M/W/F, through RUE AV fistula), hx of CVA x 2 (with residual R. sided weakness and dysphagia s/p PEG tube, previously with tracheostomy and now removed), and hx of RLE DVT (completed apixaban course) who presented to the ED on 10/4/24 for complaints of cough and chills. Baseline functional status is wheelchair bound and dependent with all ADLs. NPO with Nepro q4. Lives at home with wife. Initially placed on 15L NRB with improvement of SpO2 to 95%. Then changed to high flow nasal cannula. Desaturated on 10/8, and switched to BiPAP  Patient was admitted in July to the hospital and received 7 days of antibiotics   He grew citrobacter that was quite sensitive but could have more resistant organisms given the hospitalization   additionally he is a hemodialysis patient getting hemodialysis 3 days a week and has a degree of being immunocompromised     10/9: intubated, sedated, getting hemodialysis again today, continue high dose zodsyn while awaiting cultures, cultures negative thus far, s/p bronch and will follow those cultures and adjust antibiotics accordingly     Multifocal pneumonia  Acute respiratory failure due to hypoxia    ESRD on HD  IDDM2     Plan:  Zosyn to 4.5g q12hrs for better pseudomonas coverage   follow blood cultures   s/p bronchoscopy and will follow cultures from the bronch   MRSA negative with a good negative PPV thus can stop giving doses of vancomycin for now  received one dose of vancomycin yesterday  wean oxygen and ventilator requirement as tolerated   good glycemic control and avoid hypoglycemia  continue hemodialysis as per renal     Discussed with ICU       Neslon Magallon DO  Chief, Infectious Disease at [facility]  Reachable via Microsoft Teams or ID office: [phone]  Weekdays: After 5pm, please call [phone] for all inquiries and new consults  Weekends: Message on-call infectious disease physician via teams (tawny Poon)

## 2024-10-10 LAB
ALBUMIN SERPL ELPH-MCNC: 2.3 G/DL — LOW (ref 3.3–5)
ALP SERPL-CCNC: 103 U/L — SIGNIFICANT CHANGE UP (ref 40–120)
ALT FLD-CCNC: 40 U/L — SIGNIFICANT CHANGE UP (ref 12–78)
ANION GAP SERPL CALC-SCNC: 13 MMOL/L — SIGNIFICANT CHANGE UP (ref 5–17)
AST SERPL-CCNC: 55 U/L — HIGH (ref 15–37)
BILIRUB SERPL-MCNC: 0.6 MG/DL — SIGNIFICANT CHANGE UP (ref 0.2–1.2)
BLD GP AB SCN SERPL QL: SIGNIFICANT CHANGE UP
BUN SERPL-MCNC: 30 MG/DL — HIGH (ref 7–23)
CALCIUM SERPL-MCNC: 8.8 MG/DL — SIGNIFICANT CHANGE UP (ref 8.5–10.1)
CHLORIDE SERPL-SCNC: 102 MMOL/L — SIGNIFICANT CHANGE UP (ref 96–108)
CO2 SERPL-SCNC: 24 MMOL/L — SIGNIFICANT CHANGE UP (ref 22–31)
CREAT SERPL-MCNC: 2.32 MG/DL — HIGH (ref 0.5–1.3)
CULTURE RESULTS: SIGNIFICANT CHANGE UP
EGFR: 29 ML/MIN/1.73M2 — LOW
GLUCOSE BLDC GLUCOMTR-MCNC: 230 MG/DL — HIGH (ref 70–99)
GLUCOSE BLDC GLUCOMTR-MCNC: 250 MG/DL — HIGH (ref 70–99)
GLUCOSE BLDC GLUCOMTR-MCNC: 323 MG/DL — HIGH (ref 70–99)
GLUCOSE BLDC GLUCOMTR-MCNC: 370 MG/DL — HIGH (ref 70–99)
GLUCOSE SERPL-MCNC: 178 MG/DL — HIGH (ref 70–99)
HCT VFR BLD CALC: 17.7 % — CRITICAL LOW (ref 39–50)
HCT VFR BLD CALC: 17.9 % — CRITICAL LOW (ref 39–50)
HCT VFR BLD CALC: 21.5 % — LOW (ref 39–50)
HGB BLD-MCNC: 5.9 G/DL — CRITICAL LOW (ref 13–17)
HGB BLD-MCNC: 5.9 G/DL — CRITICAL LOW (ref 13–17)
HGB BLD-MCNC: 7.1 G/DL — LOW (ref 13–17)
MAGNESIUM SERPL-MCNC: 2.2 MG/DL — SIGNIFICANT CHANGE UP (ref 1.6–2.6)
MCHC RBC-ENTMCNC: 21.9 PG — LOW (ref 27–34)
MCHC RBC-ENTMCNC: 33 G/DL — SIGNIFICANT CHANGE UP (ref 32–36)
MCHC RBC-ENTMCNC: 33 G/DL — SIGNIFICANT CHANGE UP (ref 32–36)
MCHC RBC-ENTMCNC: 33.3 G/DL — SIGNIFICANT CHANGE UP (ref 32–36)
MCV RBC AUTO: 65.6 FL — LOW (ref 80–100)
MCV RBC AUTO: 66.4 FL — LOW (ref 80–100)
MCV RBC AUTO: 66.5 FL — LOW (ref 80–100)
NRBC # BLD: 0 /100 WBCS — SIGNIFICANT CHANGE UP (ref 0–0)
PHOSPHATE SERPL-MCNC: 1.7 MG/DL — LOW (ref 2.5–4.5)
PLATELET # BLD AUTO: 228 K/UL — SIGNIFICANT CHANGE UP (ref 150–400)
PLATELET # BLD AUTO: 240 K/UL — SIGNIFICANT CHANGE UP (ref 150–400)
PLATELET # BLD AUTO: 241 K/UL — SIGNIFICANT CHANGE UP (ref 150–400)
POTASSIUM SERPL-MCNC: 3.6 MMOL/L — SIGNIFICANT CHANGE UP (ref 3.5–5.3)
POTASSIUM SERPL-SCNC: 3.6 MMOL/L — SIGNIFICANT CHANGE UP (ref 3.5–5.3)
PROT SERPL-MCNC: 6.7 GM/DL — SIGNIFICANT CHANGE UP (ref 6–8.3)
RBC # BLD: 2.69 M/UL — LOW (ref 4.2–5.8)
RBC # BLD: 2.7 M/UL — LOW (ref 4.2–5.8)
RBC # BLD: 3.24 M/UL — LOW (ref 4.2–5.8)
RBC # FLD: 18.4 % — HIGH (ref 10.3–14.5)
RBC # FLD: 18.8 % — HIGH (ref 10.3–14.5)
RBC # FLD: 19.1 % — HIGH (ref 10.3–14.5)
SODIUM SERPL-SCNC: 139 MMOL/L — SIGNIFICANT CHANGE UP (ref 135–145)
SPECIMEN SOURCE: SIGNIFICANT CHANGE UP
WBC # BLD: 14.31 K/UL — HIGH (ref 3.8–10.5)
WBC # BLD: 14.56 K/UL — HIGH (ref 3.8–10.5)
WBC # BLD: 14.7 K/UL — HIGH (ref 3.8–10.5)
WBC # FLD AUTO: 14.31 K/UL — HIGH (ref 3.8–10.5)
WBC # FLD AUTO: 14.56 K/UL — HIGH (ref 3.8–10.5)
WBC # FLD AUTO: 14.7 K/UL — HIGH (ref 3.8–10.5)

## 2024-10-10 PROCEDURE — 99291 CRITICAL CARE FIRST HOUR: CPT

## 2024-10-10 PROCEDURE — 99231 SBSQ HOSP IP/OBS SF/LOW 25: CPT

## 2024-10-10 RX ORDER — IPRATROPIUM BROMIDE AND ALBUTEROL SULFATE .5; 2.5 MG/3ML; MG/3ML
3 SOLUTION RESPIRATORY (INHALATION) ONCE
Refills: 0 | Status: COMPLETED | OUTPATIENT
Start: 2024-10-10 | End: 2024-10-10

## 2024-10-10 RX ORDER — SODIUM CHLORIDE 9 MG/ML
4 INJECTION, SOLUTION INTRAMUSCULAR; INTRAVENOUS; SUBCUTANEOUS ONCE
Refills: 0 | Status: COMPLETED | OUTPATIENT
Start: 2024-10-10 | End: 2024-10-10

## 2024-10-10 RX ORDER — POTASSIUM PHOSPHATE 236; 224 MG/ML; MG/ML
30 INJECTION, SOLUTION INTRAVENOUS ONCE
Refills: 0 | Status: COMPLETED | OUTPATIENT
Start: 2024-10-10 | End: 2024-10-10

## 2024-10-10 RX ORDER — HYDRALAZINE HYDROCHLORIDE 50 MG/1
50 TABLET, FILM COATED ORAL EVERY 8 HOURS
Refills: 0 | Status: DISCONTINUED | OUTPATIENT
Start: 2024-10-10 | End: 2024-10-13

## 2024-10-10 RX ADMIN — PROPOFOL 10.4 MICROGRAM(S)/KG/MIN: 10 INJECTION, EMULSION INTRAVENOUS at 02:18

## 2024-10-10 RX ADMIN — IPRATROPIUM BROMIDE AND ALBUTEROL SULFATE 3 MILLILITER(S): .5; 2.5 SOLUTION RESPIRATORY (INHALATION) at 00:08

## 2024-10-10 RX ADMIN — CHLORHEXIDINE GLUCONATE 15 MILLILITER(S): 40 SOLUTION TOPICAL at 05:22

## 2024-10-10 RX ADMIN — IPRATROPIUM BROMIDE AND ALBUTEROL SULFATE 3 MILLILITER(S): .5; 2.5 SOLUTION RESPIRATORY (INHALATION) at 12:42

## 2024-10-10 RX ADMIN — HYDROCORTISONE 50 MILLIGRAM(S): 10 TABLET ORAL at 17:46

## 2024-10-10 RX ADMIN — Medication 20 MILLIGRAM(S): at 22:55

## 2024-10-10 RX ADMIN — IPRATROPIUM BROMIDE AND ALBUTEROL SULFATE 3 MILLILITER(S): .5; 2.5 SOLUTION RESPIRATORY (INHALATION) at 17:05

## 2024-10-10 RX ADMIN — HYDRALAZINE HYDROCHLORIDE 50 MILLIGRAM(S): 50 TABLET, FILM COATED ORAL at 23:15

## 2024-10-10 RX ADMIN — IPRATROPIUM BROMIDE AND ALBUTEROL SULFATE 3 MILLILITER(S): .5; 2.5 SOLUTION RESPIRATORY (INHALATION) at 05:20

## 2024-10-10 RX ADMIN — HEPARIN SODIUM 5000 UNIT(S): 10000 INJECTION INTRAVENOUS; SUBCUTANEOUS at 22:55

## 2024-10-10 RX ADMIN — Medication 2.18 MICROGRAM(S)/KG/HR: at 02:18

## 2024-10-10 RX ADMIN — HYDROCORTISONE 50 MILLIGRAM(S): 10 TABLET ORAL at 05:20

## 2024-10-10 RX ADMIN — Medication 4: at 05:19

## 2024-10-10 RX ADMIN — SODIUM CHLORIDE 4 MILLILITER(S): 9 INJECTION, SOLUTION INTRAMUSCULAR; INTRAVENOUS; SUBCUTANEOUS at 14:57

## 2024-10-10 RX ADMIN — Medication 10: at 23:15

## 2024-10-10 RX ADMIN — SODIUM CHLORIDE 4 MILLILITER(S): 9 INJECTION, SOLUTION INTRAMUSCULAR; INTRAVENOUS; SUBCUTANEOUS at 17:05

## 2024-10-10 RX ADMIN — SODIUM CHLORIDE 4 MILLILITER(S): 9 INJECTION, SOLUTION INTRAMUSCULAR; INTRAVENOUS; SUBCUTANEOUS at 00:35

## 2024-10-10 RX ADMIN — SODIUM CHLORIDE 4 MILLILITER(S): 9 INJECTION, SOLUTION INTRAMUSCULAR; INTRAVENOUS; SUBCUTANEOUS at 05:21

## 2024-10-10 RX ADMIN — Medication 4: at 17:46

## 2024-10-10 RX ADMIN — PIPERACILLIN AND TAZOBACTAM 25 GRAM(S): .5; 4 INJECTION, POWDER, LYOPHILIZED, FOR SOLUTION INTRAVENOUS at 22:54

## 2024-10-10 RX ADMIN — HEPARIN SODIUM 5000 UNIT(S): 10000 INJECTION INTRAVENOUS; SUBCUTANEOUS at 13:02

## 2024-10-10 RX ADMIN — PIPERACILLIN AND TAZOBACTAM 25 GRAM(S): .5; 4 INJECTION, POWDER, LYOPHILIZED, FOR SOLUTION INTRAVENOUS at 11:31

## 2024-10-10 RX ADMIN — CHLORHEXIDINE GLUCONATE 1 APPLICATION(S): 40 SOLUTION TOPICAL at 05:20

## 2024-10-10 RX ADMIN — Medication 7 UNIT(S): at 22:54

## 2024-10-10 RX ADMIN — IPRATROPIUM BROMIDE AND ALBUTEROL SULFATE 3 MILLILITER(S): .5; 2.5 SOLUTION RESPIRATORY (INHALATION) at 14:39

## 2024-10-10 RX ADMIN — POTASSIUM PHOSPHATE 83.33 MILLIMOLE(S): 236; 224 INJECTION, SOLUTION INTRAVENOUS at 04:15

## 2024-10-10 RX ADMIN — SODIUM CHLORIDE 4 MILLILITER(S): 9 INJECTION, SOLUTION INTRAMUSCULAR; INTRAVENOUS; SUBCUTANEOUS at 12:42

## 2024-10-10 NOTE — PROGRESS NOTE ADULT - ASSESSMENT
This is a 73 year old man with history of HTN, IDDM2, ESRD on HD (M/W/F, through RUE AV fistula), hx of CVA x 2 (with residual R sided weakness and dysphagia s/p PEG tube, previously with tracheostomy and now removed), and hx of RLE DVT (completed apixaban course) who presented to the ED on 10/4/24 for complaints of cough and chills. Baseline functional status is wheelchair bound and dependent with all ADLs. NPO with Nepro q4. Lives at home with wife. Initially placed on 15L NRB with improvement of SpO2 to 95%. Then changed to high flow nasal cannula. Desaturated on 10/8, and switched to BiPAP  Patient was admitted in July to the hospital and received 7 days of antibiotics   He grew citrobacter that was quite sensitive but could have more resistant organisms given the hospitalization   additionally he is a hemodialysis patient getting hemodialysis 3 days a week and has a degree of being immunocompromised     10/9: intubated, sedated, getting hemodialysis again today, continue high dose zosyn while awaiting cultures, cultures negative thus far, s/p bronch and will follow those cultures and adjust antibiotics accordingly   10/10:     1. Multifocal pneumonia  2. Acute respiratory failure due to hypoxia    3. ESRD on HD  4. IDDM2     Plan:  Zosyn to 4.5g q12hrs for better pseudomonas coverage   follow blood cultures   s/p bronchoscopy and will follow cultures from the bronch   MRSA negative with a good negative PPV thus can stop giving doses of vancomycin for now  received one dose of vancomycin yesterday  wean oxygen and ventilator requirement as tolerated   good glycemic control and avoid hypoglycemia  continue hemodialysis as per renal     Discussed with ICU     Sandra Donaldson MD  Attending Physician  Division of Infectious Diseases   Available via Microsoft Teams   This is a 73 year old man with history of HTN, IDDM2, ESRD on HD (M/W/F, through RUE AV fistula), hx of CVA x 2 (with residual R sided weakness and dysphagia s/p PEG tube, previously with tracheostomy and now removed), and hx of RLE DVT (completed apixaban course) who presented to the ED on 10/4/24 for complaints of cough and chills. Baseline functional status is wheelchair bound and dependent with all ADLs. NPO with Nepro q4. Lives at home with wife. Initially placed on 15L NRB with improvement of SpO2 to 95%. Then changed to high flow nasal cannula. Desaturated on 10/8, and switched to BiPAP  Patient was admitted in July to the hospital and received 7 days of antibiotics   He grew citrobacter that was quite sensitive but could have more resistant organisms given the hospitalization   additionally he is a hemodialysis patient getting hemodialysis 3 days a week and has a degree of being immunocompromised     10/9: intubated, sedated, getting hemodialysis again today, continue high dose zosyn while awaiting cultures, cultures negative thus far, s/p bronch and will follow those cultures and adjust antibiotics accordingly   10/10: Afebrile, Intubated, sedated. Noted with Hgb of 5.9 this am. Improved tracheal secretions per RN. On minimal vent settings, possible extubation trial today. HD tomorrow per renal. No growth on blood cultures. Bronch culture with Pseudomonas aeruginosa, pending susceptibilities.    Impression:  1. Multifocal pneumonia  2. Acute respiratory failure due to hypoxia    3. ESRD on HD  4. IDDM2       Plan:  -Continue Zosyn 4.5g IV q12hrs (day#7). Will likely stop tomorrow  -Follow bronchoscopy culture for susceptibility report   -Wean oxygen and ventilator requirement as tolerated   -Good glycemic control and avoid hypoglycemia  -Continue hemodialysis as per renal     Discussed with primary team    Sandra Donaldson MD  Attending Physician  Division of Infectious Diseases   Available via Microsoft Teams

## 2024-10-10 NOTE — PROGRESS NOTE ADULT - SUBJECTIVE AND OBJECTIVE BOX
[facility]  INFECTIOUS DISEASES   [address] [place] [address]  Tel: [phone]     Fax: [phone]  ==============================================================================  DO Artie Aquino MD Alexandra Gutman, NP   ==============================================================================      JIMMY BLAND  MRN-[id number]  73y ([birth year])      Interval History: patient seen and examined.     ROS:    [x ] Unobtainable because: intubated and sedated   [ ] All other systems negative except as noted    Constitutional: no fever, no chills  Head: no trauma  Eyes: no vision changes, no eye pain  ENT:  no sore throat, no rhinorrhea  Cardiovascular:  no chest pain, no palpitation  Respiratory:  no SOB, no cough  GI:  no abd pain, no vomiting, no diarrhea  urinary: no dysuria, no hematuria, no flank pain  musculoskeletal:  no joint pain, no joint swelling  skin:  no rash  neurology:  no headache, no seizure, no change in mental status  psych: no anxiety, no depression         Allergies  No Known Allergies        ANTIMICROBIALS:  piperacillin/tazobactam IVPB.. 4.5 every 12 hours      OTHER MEDS:  albuterol/ipratropium for Nebulization 3 milliLiter(s) Nebulizer every 6 hours  aspirin  chewable 81 milliGRAM(s) Enteral Tube daily  atorvastatin 20 milliGRAM(s) Oral at bedtime  chlorhexidine 0.12% Liquid 15 milliLiter(s) Oral Mucosa every 12 hours  chlorhexidine 2% Cloths 1 Application(s) Topical <User Schedule>  fentaNYL   Infusion. 0.5 MICROgram(s)/kG/Hr IV Continuous <Continuous>  ferrous    sulfate Liquid 300 milliGRAM(s) Enteral Tube daily  heparin   Injectable 5000 Unit(s) SubCutaneous every 8 hours  hydrocortisone sodium succinate Injectable 50 milliGRAM(s) IV Push every 6 hours  insulin glargine Injectable (LANTUS) 7 Unit(s) SubCutaneous at bedtime  insulin lispro (ADMELOG) corrective regimen sliding scale   SubCutaneous every 6 hours  Nephro-noam 1 Tablet(s) Oral daily  norepinephrine Infusion 0.05 MICROgram(s)/kG/Min IV Continuous <Continuous>  pantoprazole   Suspension 40 milliGRAM(s) Enteral Tube daily  polyethylene glycol 3350 17 Gram(s) Oral daily  propofol Infusion 40 MICROgram(s)/kG/Min IV Continuous <Continuous>  sodium chloride 0.9% lock flush 10 milliLiter(s) IV Push every 1 hour PRN  sodium chloride 3%  Inhalation 4 milliLiter(s) Inhalation every 6 hours      Physical Exam:  Vital Signs Last 24 Hrs  T(C): 36.7 (10 Oct 2024 10:30), Max: 36.7 (10 Oct 2024 09:14)  T(F): 98.1 (10 Oct 2024 10:30), Max: 98.1 (10 Oct 2024 09:14)  HR: 79 (10 Oct 2024 10:30) (65 - 104)  BP: 146/56 (10 Oct 2024 10:00) (108/57 - 160/36)  BP(mean): 82 (10 Oct 2024 10:00) (64 - 116)  RR: 20 (10 Oct 2024 10:30) (17 - 23)  SpO2: 100% (10 Oct 2024 10:30) (93% - 100%)    Parameters below as of 10 Oct 2024 05:23  Patient On (Oxygen Delivery Method): ventilator      General:    NAD, intubated   Head: atraumatic, normocephalic  Eye: normal sclera and conjunctiva  ENT:    no oral lesions, neck supple, + IJ CVC c.d.i  Cardio:     regular S1, S2,  no murmur  Respiratory:    diminished BS b/l,    no wheezing, +ETT  abd:     soft,   BS +,   no tenderness, + peg   :   no CVAT,  no suprapubic tenderness,    Musculoskeletal:   no joint swelling,   no edema  vascular: +central lines, +PIV, RUE AV fistula   Skin:    no rash  Neurologic:     intubated and sedated   psych: normal affect    WBC Count: 14.56 K/uL (10-10 @ 03:35)  WBC Count: 14.70 K/uL (10-10 @ 02:32)  WBC Count: 25.96 K/uL (10-09 @ 02:30)  WBC Count: 32.85 K/uL (10-08 @ 20:10)  WBC Count: 20.55 K/uL (10-08 @ 12:30)  WBC Count: 19.76 K/uL (10-07 @ 18:35)  WBC Count: 14.32 K/uL (10-05 @ 08:37)                          5.9    14.56 )-----------( 241      ( 10 Oct 2024 03:35 )             17.7                10-10    139  |  102  |  30[H]  ----------------------------<  178[H]  3.6   |  24  |  2.32[H]    Ca    8.8      10 Oct 2024 02:32  Phos  1.7     10-10  Mg     2.2     10-10    TPro  6.7  /  Alb  2.3[L]  /  TBili  0.6  /  DBili  x   /  AST  55[H]  /  ALT  40  /  AlkPhos  103  10-10    Creatinine: 2.32 (10-10)  Creatinine: 3.63 (10-09)  Creatinine: 3.28 (10-08)  Creatinine: 5.83 (10-08)  Creatinine: 5.06 (10-07)  Creatinine: 4.79 (10-05)  Creatinine: 4.05 (10-04)      Lactate, Blood: 1.2 mmol/L (10-08 @ 20:10)      Mode: AC/ CMV (Assist Control/ Continuous Mandatory Ventilation)  RR (machine): 16  TV (machine): 400  FiO2: 30  PEEP: 8  ITime: 1  MAP: 11  PIP: 21        MICROBIOLOGY:  Bronchial  10-08-24   Few Pseudomonas aeruginosa  --    Moderate polymorphonuclear leukocytes seen per low power field  Few Squamous epithelial cells seen per low power field  Few Gram Negative Rods seen per oil power field      .Stool  10-08-24   No Protozoa seen by trichrome stain  No Helminths or Protozoa seen in formalin concentrate  performed by iodine stain  (routine O+P not evaluated for Microsporidia,  Cryptosporidia or Cyclospora)  One negative sample does not necessarily rule  out the presence of a parasitic infection.  --  --      .Blood BLOOD  10-07-24   No growth at 48 Hours  --  --      .Blood BLOOD  10-07-24   No growth at 48 Hours  --  --      .Blood BLOOD  10-04-24   No growth at 5 days  --  --      .Blood BLOOD  10-04-24   No growth at 5 days  --  --        Rapid RVP Result: NotDetec (10-08 @ 10:00)    Procalcitonin: 27.40 (10-07-24 @ 18:35)  Procalcitonin: 22.50 (10-05-24 @ 08:37)    Rapid RVP Result: NotDetec (10-08-24 @ 10:00)  SARS-CoV-2: NotDetec (10-08-24 @ 10:00)        RADIOLOGY:    ACC: [id number] EXAM:  XR CHEST PORTABLE URGENT 1V   ORDERED BY: DARIANA HOPKINS     PROCEDURE DATE:  10/08/2024          INTERPRETATION:  Portable AP chest radiograph    COMPARISON: 10/8/2024 and 10/7/2024 chest and CT the chest imaging..    CLINICAL INFORMATION: Line placement.    FINDINGS:  CATHETERS AND TUBES: RIGHT IJ catheter tip in SVC.    PULMONARY: Elevated RIGHT hemidiaphragm Elevated RIGHT hemidiaphragm with   RIGHT perihilar/lower zone airspace disease.  LEFT lung parenchyma clear.      HEART/VASCULAR: The heart size and mediastinum configuration are within   the limits of normal.    BONES: The visualized osseous thorax is intact.    IMPRESSION: RIGHT IJ catheter tip in SVC.  Elevated RIGHT hemidiaphragm with RIGHT perihilar/lower zone airspace   disease.  LEFT lung parenchyma clear.        ITALIA MICHELLE MD  This document has been electronically signed. Oct  9 2024  1:19PM      ACC: [id number] EXAM:  XR CHEST PORTABLE URGENT 1V   ORDERED BY: PRASAD SPENCER     PROCEDURE DATE:  10/08/2024          INTERPRETATION:  Portable AP chest radiograph    COMPARISON: 10/4/2024 chest x-ray.    CLINICAL INFORMATION: Status post bronchoscopy. RIGHT lower lobe   pneumonia..    FINDINGS:  CATHETERS AND TUBES: ET tube tip above tracheal bifurcation.    PULMONARY:  Residual RIGHT perihilar/lower zone and diffuse airspace disease .  LEFT lung parenchyma clear.  No pneumothorax.      HEART/VASCULAR: The heart size and mediastinum configuration are within   the limits of normal.    BONES: The visualized osseous thorax is intact.    IMPRESSION:  ET tube tip above the bifurcation.  Residual RIGHT perihilar/lower zone diffuse airspace disease .      ITALIA MICHELLE MD  This document has been electronically signed. Oct  9 2024  1:55PM   [facility]  INFECTIOUS DISEASES   [address] [place] [address]  Tel: [phone]     Fax: [phone]  ==============================================================================  DO Artie Aquino MD Alexandra Gutman, NP   ==============================================================================      JIMMY BLAND  MRN-[id number]  73y ([birth year])      Interval History: patient seen and examined in ICU. His wife (Pauline) is present at bedside. Patient is afebrile, remains intubated on FiO2:30% and PEEP of 8. The tracheal secretions improved as per RN    ROS:    [x ] Unobtainable because: intubated and sedated       Allergies  No Known Allergies      ANTIMICROBIALS:  piperacillin/tazobactam IVPB.. 4.5 every 12 hours  MEDICATIONS  (STANDING):  piperacillin/tazobactam IVPB..   25 mL/Hr IV Intermittent (10-08-24 @ 14:10)   25 mL/Hr IV Intermittent (10-08-24 @ 03:27)   25 mL/Hr IV Intermittent (10-07-24 @ 14:15)   25 mL/Hr IV Intermittent (10-07-24 @ 03:45)   25 mL/Hr IV Intermittent (10-06-24 @ 14:20)   25 mL/Hr IV Intermittent (10-06-24 @ 03:41)   25 mL/Hr IV Intermittent (10-05-24 @ 18:03)   25 mL/Hr IV Intermittent (10-05-24 @ 02:39)    piperacillin/tazobactam IVPB..   25 mL/Hr IV Intermittent (10-10-24 @ 11:31)   25 mL/Hr IV Intermittent (10-09-24 @ 22:33)   25 mL/Hr IV Intermittent (10-09-24 @ 11:20)   25 mL/Hr IV Intermittent (10-08-24 @ 21:58)    piperacillin/tazobactam IVPB...   200 mL/Hr IV Intermittent (10-04-24 @ 18:32)    vancomycin  IVPB   250 mL/Hr IV Intermittent (10-08-24 @ 10:05)    vancomycin  IVPB.   250 mL/Hr IV Intermittent (10-04-24 @ 20:25)        OTHER MEDS:  albuterol/ipratropium for Nebulization 3 milliLiter(s) Nebulizer every 6 hours  aspirin  chewable 81 milliGRAM(s) Enteral Tube daily  atorvastatin 20 milliGRAM(s) Oral at bedtime  chlorhexidine 0.12% Liquid 15 milliLiter(s) Oral Mucosa every 12 hours  chlorhexidine 2% Cloths 1 Application(s) Topical <User Schedule>  fentaNYL   Infusion. 0.5 MICROgram(s)/kG/Hr IV Continuous <Continuous>  ferrous    sulfate Liquid 300 milliGRAM(s) Enteral Tube daily  heparin   Injectable 5000 Unit(s) SubCutaneous every 8 hours  hydrocortisone sodium succinate Injectable 50 milliGRAM(s) IV Push every 6 hours  insulin glargine Injectable (LANTUS) 7 Unit(s) SubCutaneous at bedtime  insulin lispro (ADMELOG) corrective regimen sliding scale   SubCutaneous every 6 hours  Nephro-noam 1 Tablet(s) Oral daily  norepinephrine Infusion 0.05 MICROgram(s)/kG/Min IV Continuous <Continuous>  pantoprazole   Suspension 40 milliGRAM(s) Enteral Tube daily  polyethylene glycol 3350 17 Gram(s) Oral daily  propofol Infusion 40 MICROgram(s)/kG/Min IV Continuous <Continuous>  sodium chloride 0.9% lock flush 10 milliLiter(s) IV Push every 1 hour PRN  sodium chloride 3%  Inhalation 4 milliLiter(s) Inhalation every 6 hours      Physical Exam:  Vital Signs Last 24 Hrs  T(C): 36.7 (10 Oct 2024 10:30), Max: 36.7 (10 Oct 2024 09:14)  T(F): 98.1 (10 Oct 2024 10:30), Max: 98.1 (10 Oct 2024 09:14)  HR: 79 (10 Oct 2024 10:30) (65 - 104)  BP: 146/56 (10 Oct 2024 10:00) (108/57 - 160/36)  BP(mean): 82 (10 Oct 2024 10:00) (64 - 116)  RR: 20 (10 Oct 2024 10:30) (17 - 23)  SpO2: 100% (10 Oct 2024 10:30) (93% - 100%)    Parameters below as of 10 Oct 2024 05:23  Patient On (Oxygen Delivery Method): ventilator      General:    NAD, intubated   Head: atraumatic, normocephalic  Eye: normal sclera and conjunctiva  ENT:    no oral lesions, neck supple, + IJ CVC c.d.i  Cardio:     regular S1, S2,  no murmur  Respiratory:    diminished BS b/l,    no wheezing, +ETT  abd:     soft,   BS +,   no tenderness, + peg   :   no CVAT,  no suprapubic tenderness,    Musculoskeletal:   no joint swelling,   no edema  vascular: +central lines, +PIV, RUE AV fistula   Skin:    no rash  Neurologic:     intubated and sedated   psych: normal affect    WBC Count: 14.56 K/uL (10-10 @ 03:35)  WBC Count: 14.70 K/uL (10-10 @ 02:32)  WBC Count: 25.96 K/uL (10-09 @ 02:30)  WBC Count: 32.85 K/uL (10-08 @ 20:10)  WBC Count: 20.55 K/uL (10-08 @ 12:30)  WBC Count: 19.76 K/uL (10-07 @ 18:35)  WBC Count: 14.32 K/uL (10-05 @ 08:37)                          5.9    14.56 )-----------( 241      ( 10 Oct 2024 03:35 )             17.7                10-10    139  |  102  |  30[H]  ----------------------------<  178[H]  3.6   |  24  |  2.32[H]    Ca    8.8      10 Oct 2024 02:32  Phos  1.7     10-10  Mg     2.2     10-10    TPro  6.7  /  Alb  2.3[L]  /  TBili  0.6  /  DBili  x   /  AST  55[H]  /  ALT  40  /  AlkPhos  103  10-10    Creatinine: 2.32 (10-10)  Creatinine: 3.63 (10-09)  Creatinine: 3.28 (10-08)  Creatinine: 5.83 (10-08)  Creatinine: 5.06 (10-07)  Creatinine: 4.79 (10-05)  Creatinine: 4.05 (10-04)      Lactate, Blood: 1.2 mmol/L (10-08 @ 20:10)      Mode: AC/ CMV (Assist Control/ Continuous Mandatory Ventilation)  RR (machine): 16  TV (machine): 400  FiO2: 30  PEEP: 8  ITime: 1  MAP: 11  PIP: 21      MICROBIOLOGY:  Bronchial  10-08-24   Few Pseudomonas aeruginosa  --    Moderate polymorphonuclear leukocytes seen per low power field  Few Squamous epithelial cells seen per low power field  Few Gram Negative Rods seen per oil power field      .Stool  10-08-24   No Protozoa seen by trichrome stain  No Helminths or Protozoa seen in formalin concentrate  performed by iodine stain  (routine O+P not evaluated for Microsporidia,  Cryptosporidia or Cyclospora)  One negative sample does not necessarily rule  out the presence of a parasitic infection.  --  --      .Blood BLOOD  10-07-24   No growth at 48 Hours  --  --      .Blood BLOOD  10-07-24   No growth at 48 Hours  --  --      .Blood BLOOD  10-04-24   No growth at 5 days  --  --      .Blood BLOOD  10-04-24   No growth at 5 days  --  --        Rapid RVP Result: NotDetec (10-08 @ 10:00)    Procalcitonin: 27.40 (10-07-24 @ 18:35)  Procalcitonin: 22.50 (10-05-24 @ 08:37)    Rapid RVP Result: NotDetec (10-08-24 @ 10:00)  SARS-CoV-2: NotDetec (10-08-24 @ 10:00)        RADIOLOGY:    ACC: [id number] EXAM:  XR CHEST PORTABLE URGENT 1V   ORDERED BY: DARIANA HOPKINS     PROCEDURE DATE:  10/08/2024          INTERPRETATION:  Portable AP chest radiograph    COMPARISON: 10/8/2024 and 10/7/2024 chest and CT the chest imaging..    CLINICAL INFORMATION: Line placement.    FINDINGS:  CATHETERS AND TUBES: RIGHT IJ catheter tip in SVC.    PULMONARY: Elevated RIGHT hemidiaphragm Elevated RIGHT hemidiaphragm with   RIGHT perihilar/lower zone airspace disease.  LEFT lung parenchyma clear.      HEART/VASCULAR: The heart size and mediastinum configuration are within   the limits of normal.    BONES: The visualized osseous thorax is intact.    IMPRESSION: RIGHT IJ catheter tip in SVC.  Elevated RIGHT hemidiaphragm with RIGHT perihilar/lower zone airspace   disease.  LEFT lung parenchyma clear.        ITALIA MICHELLE MD  This document has been electronically signed. Oct  9 2024  1:19PM      ACC: [id number] EXAM:  XR CHEST PORTABLE URGENT 1V   ORDERED BY: PRASAD SPENCER     PROCEDURE DATE:  10/08/2024          INTERPRETATION:  Portable AP chest radiograph    COMPARISON: 10/4/2024 chest x-ray.    CLINICAL INFORMATION: Status post bronchoscopy. RIGHT lower lobe   pneumonia..    FINDINGS:  CATHETERS AND TUBES: ET tube tip above tracheal bifurcation.    PULMONARY:  Residual RIGHT perihilar/lower zone and diffuse airspace disease .  LEFT lung parenchyma clear.  No pneumothorax.      HEART/VASCULAR: The heart size and mediastinum configuration are within   the limits of normal.    BONES: The visualized osseous thorax is intact.    IMPRESSION:  ET tube tip above the bifurcation.  Residual RIGHT perihilar/lower zone diffuse airspace disease .      ITALIA MICHELLE MD  This document has been electronically signed. Oct  9 2024  1:55PM

## 2024-10-10 NOTE — AIRWAY REMOVAL NOTE  ADULT & PEDS - ARTIFICAL AIRWAY REMOVAL COMMENTS
Pt was extubated to 2LPM/NC as physician ordered, Pt tolerated well, HR 10bpm, SAT 96%. Suman Contreras RN. was present at extubation time. Pt's vitals are in continuous monitor..

## 2024-10-10 NOTE — PROGRESS NOTE ADULT - SUBJECTIVE AND OBJECTIVE BOX
[facility] NEPHROLOGY SERVICES, [facility]  NEPHROLOGY AND HYPERTENSION  [address]  [address]  [place] [address]  [phone]    MD RONNA CHANG MD YELENA ROSENBERG, MD BINNY KOSHY, MD CHRISTOPHER CAPUTO, MD EDWARD BOVER, MD          Patient events noted    MEDICATIONS  (STANDING):  albuterol/ipratropium for Nebulization 3 milliLiter(s) Nebulizer every 6 hours  aspirin  chewable 81 milliGRAM(s) Enteral Tube daily  atorvastatin 20 milliGRAM(s) Oral at bedtime  chlorhexidine 0.12% Liquid 15 milliLiter(s) Oral Mucosa every 12 hours  chlorhexidine 2% Cloths 1 Application(s) Topical <User Schedule>  fentaNYL   Infusion. 0.5 MICROgram(s)/kG/Hr (2.18 mL/Hr) IV Continuous <Continuous>  ferrous    sulfate Liquid 300 milliGRAM(s) Enteral Tube daily  heparin   Injectable 5000 Unit(s) SubCutaneous every 8 hours  hydrocortisone sodium succinate Injectable 50 milliGRAM(s) IV Push every 6 hours  insulin glargine Injectable (LANTUS) 7 Unit(s) SubCutaneous at bedtime  insulin lispro (ADMELOG) corrective regimen sliding scale   SubCutaneous every 6 hours  Nephro-noam 1 Tablet(s) Oral daily  norepinephrine Infusion 0.05 MICROgram(s)/kG/Min (2.1 mL/Hr) IV Continuous <Continuous>  pantoprazole   Suspension 40 milliGRAM(s) Enteral Tube daily  piperacillin/tazobactam IVPB.. 4.5 Gram(s) IV Intermittent every 12 hours  polyethylene glycol 3350 17 Gram(s) Oral daily  propofol Infusion 40 MICROgram(s)/kG/Min (10.4 mL/Hr) IV Continuous <Continuous>  sodium chloride 3%  Inhalation 4 milliLiter(s) Inhalation every 6 hours    MEDICATIONS  (PRN):  sodium chloride 0.9% lock flush 10 milliLiter(s) IV Push every 1 hour PRN Pre/post blood products, medications, blood draw, and to maintain line patency      10-09-24 @ 07:01  -  10-10-24 @ 07:00  --------------------------------------------------------  IN: 2024.3 mL / OUT: 0 mL / NET: 2024.3 mL      PHYSICAL EXAM:      T(C): 36.7 (10-10-24 @ 09:14), Max: 36.7 (10-10-24 @ 09:14)  HR: 86 (10-10-24 @ 09:14) (65 - 104)  BP: 122/55 (10-10-24 @ 08:00) (108/57 - 160/36)  RR: 20 (10-10-24 @ 09:14) (17 - 23)  SpO2: 100% (10-10-24 @ 09:14) (93% - 100%)  Wt(kg): --  Lungs clear ant decreeaed BS at bases   Heart S1S2  Abd soft NT ND  Extremities:   tr edema                                    5.9    14.56 )-----------( 241      ( 10 Oct 2024 03:35 )             17.7     10-10    139  |  102  |  30[H]  ----------------------------<  178[H]  3.6   |  24  |  2.32[H]    Ca    8.8      10 Oct 2024 02:32  Phos  1.7     10-10  Mg     2.2     10-10    TPro  6.7  /  Alb  2.3[L]  /  TBili  0.6  /  DBili  x   /  AST  55[H]  /  ALT  40  /  AlkPhos  103  10-10    ABG - ( 09 Oct 2024 04:59 )  pH, Arterial: 7.41  pH, Blood: x     /  pCO2: 41    /  pO2: 120   / HCO3: 26    / Base Excess: 1.2   /  SaO2: 97.9              LIVER FUNCTIONS - ( 10 Oct 2024 02:32 )  Alb: 2.3 g/dL / Pro: 6.7 gm/dL / ALK PHOS: 103 U/L / ALT: 40 U/L / AST: 55 U/L / GGT: x           Creatinine Trend: 2.32<--, 3.63<--, 3.28<--, 5.83<--, 5.06<--, 4.79<--  Mode: AC/ CMV (Assist Control/ Continuous Mandatory Ventilation)  RR (machine): 16  TV (machine): 400  FiO2: 25  PEEP: 8  ITime: 1  MAP: 11  PIP: 22          Assessment   ESRD, PNA, left pleural effusion  Respiratory failure   Post HD yesterday, no fluid removed as requested     Plan:  HD for tomorrow  Will follow course  Delonte Moya MD

## 2024-10-10 NOTE — PROGRESS NOTE ADULT - SUBJECTIVE AND OBJECTIVE BOX
Date of service  is equal to the date of entry    Patient is a 73y old  Male who presents with a chief complaint of Sepsis and acute hypoxic respiratory failure secondary to suspected aspiration PNA vs. HCAP (10 Oct 2024 17:19)    PAST MEDICAL & SURGICAL HISTORY:  ESRD on hemodialysis      HTN (hypertension)      Insulin dependent type 2 diabetes mellitus      History of cerebrovascular accident (CVA) with residual deficit      History of DVT of lower extremity      Arteriovenous fistula      S/P percutaneous endoscopic gastrostomy (PEG) tube placement      History of tracheostomy        JIMMY BLAND   73y    Male    BRIEF HOSPITAL COURSE:    Review of Systems:   No SOB                    All other ROS are negative.    Allergies    No Known Allergies    Intolerances          ICU Vital Signs Last 24 Hrs  T(C): 36.2 (10 Oct 2024 22:41), Max: 36.7 (10 Oct 2024 09:14)  T(F): 97.2 (10 Oct 2024 22:41), Max: 98.1 (10 Oct 2024 09:14)  HR: 96 (10 Oct 2024 22:41) (65 - 112)  BP: 184/129 (10 Oct 2024 20:00) (110/89 - 184/129)  BP(mean): 147 (10 Oct 2024 20:00) (68 - 147)  ABP: 176/63 (10 Oct 2024 22:41) (101/43 - 199/72)  ABP(mean): 109 (10 Oct 2024 22:41) (63 - 125)  RR: 19 (10 Oct 2024 22:41) (9 - 25)  SpO2: 96% (10 Oct 2024 22:41) (92% - 100%)    O2 Parameters below as of 10 Oct 2024 19:11  Patient On (Oxygen Delivery Method): nasal cannula  O2 Flow (L/min): 2        Physical Examination:    General: NAD    HEENT:  No JVD     PULM: bilateral BS     CVS: s1 s2 reg    ABD:  Peg     EXT: trace edema     SKIN: warm     Neuro: alet awake     ABG - ( 09 Oct 2024 04:59 )  pH, Arterial: 7.41  pH, Blood: x     /  pCO2: 41    /  pO2: 120   / HCO3: 26    / Base Excess: 1.2   /  SaO2: 97.9              Mode: CPAP with PS  FiO2: 30  PEEP: 5  PS: 5  ITime: 1  MAP: 6  PIP: 10    Mode: CPAP with PS, FiO2: 30, PEEP: 5, PS: 5, ITime: 1, MAP: 6, PIP: 10  LABS:                        7.1    14.31 )-----------( 228      ( 10 Oct 2024 13:00 )             21.5     10-10    139  |  102  |  30[H]  ----------------------------<  178[H]  3.6   |  24  |  2.32[H]    Ca    8.8      10 Oct 2024 02:32  Phos  1.7     10-10  Mg     2.2     10-10    TPro  6.7  /  Alb  2.3[L]  /  TBili  0.6  /  DBili  x   /  AST  55[H]  /  ALT  40  /  AlkPhos  103  10-10          CAPILLARY BLOOD GLUCOSE      POCT Blood Glucose.: 230 mg/dL (10 Oct 2024 17:04)  POCT Blood Glucose.: 323 mg/dL (10 Oct 2024 10:48)  POCT Blood Glucose.: 250 mg/dL (10 Oct 2024 05:14)  POCT Blood Glucose.: 151 mg/dL (09 Oct 2024 23:04)      Urinalysis Basic - ( 10 Oct 2024 02:32 )    Color: x / Appearance: x / SG: x / pH: x  Gluc: 178 mg/dL / Ketone: x  / Bili: x / Urobili: x   Blood: x / Protein: x / Nitrite: x   Leuk Esterase: x / RBC: x / WBC x   Sq Epi: x / Non Sq Epi: x / Bacteria: x      CULTURES:  Culture Results:   Few Pseudomonas aeruginosa (10-08 @ 20:40)  Culture Results:   No growth (10-08 @ 20:40)  Culture Results:   No enteric pathogens isolated.  (Stool culture examined for Salmonella,  Shigella, Campylobacter, Aeromonas, Plesiomonas,  Vibrio, E.coli O157 and Yersinia) (10-08 @ 10:00)  Rapid RVP Result: NotDetec (10-08 @ 10:00)  Culture Results:   No Protozoa seen by trichrome stain  No Helminths or Protozoa seen in formalin concentrate  performed by iodine stain  (routine O+P not evaluated for Microsporidia,  Cryptosporidia or Cyclospora)  One negative sample does not necessarily rule  out the presence of a parasitic infection. (10-08 @ 10:00)  Culture Results:   No growth at 48 Hours (10-07 @ 18:35)  Culture Results:   No growth at 48 Hours (10-07 @ 18:30)  Culture Results:   No growth at 5 days (10-04 @ 16:40)  Culture Results:   No growth at 5 days (10-04 @ 16:30)      Medications:  MEDICATIONS  (STANDING):  albuterol/ipratropium for Nebulization 3 milliLiter(s) Nebulizer every 6 hours  aspirin  chewable 81 milliGRAM(s) Enteral Tube daily  atorvastatin 20 milliGRAM(s) Oral at bedtime  chlorhexidine 2% Cloths 1 Application(s) Topical <User Schedule>  fentaNYL   Infusion. 0.5 MICROgram(s)/kG/Hr (2.18 mL/Hr) IV Continuous <Continuous>  ferrous    sulfate Liquid 300 milliGRAM(s) Enteral Tube daily  heparin   Injectable 5000 Unit(s) SubCutaneous every 8 hours  hydrocortisone sodium succinate Injectable 50 milliGRAM(s) IV Push every 6 hours  insulin glargine Injectable (LANTUS) 7 Unit(s) SubCutaneous at bedtime  insulin lispro (ADMELOG) corrective regimen sliding scale   SubCutaneous every 6 hours  Nephro-noam 1 Tablet(s) Oral daily  norepinephrine Infusion 0.05 MICROgram(s)/kG/Min (2.1 mL/Hr) IV Continuous <Continuous>  pantoprazole   Suspension 40 milliGRAM(s) Enteral Tube daily  piperacillin/tazobactam IVPB.. 4.5 Gram(s) IV Intermittent every 12 hours  polyethylene glycol 3350 17 Gram(s) Oral daily  propofol Infusion 40 MICROgram(s)/kG/Min (10.4 mL/Hr) IV Continuous <Continuous>  sodium chloride 3%  Inhalation 4 milliLiter(s) Inhalation every 6 hours    MEDICATIONS  (PRN):  sodium chloride 0.9% lock flush 10 milliLiter(s) IV Push every 1 hour PRN Pre/post blood products, medications, blood draw, and to maintain line patency        10-09 @ 07:01  -  10-10 @ 07:00  --------------------------------------------------------  IN: 2024.3 mL / OUT: 0 mL / NET: 2024.3 mL    10-10 @ 07:01  -  10-10 @ 22:47  --------------------------------------------------------  IN: 637.4 mL / OUT: 0 mL / NET: 637.4 mL        RADIOLOGY/IMAGING/ECHO    < from: Xray Chest 1 View- PORTABLE-Urgent (Xray Chest 1 View- PORTABLE-Urgent .) (10.08.24 @ 19:15) >    IMPRESSION: RIGHT IJ catheter tip in SVC.  Elevated RIGHT hemidiaphragm with RIGHT perihilar/lower zone airspace   disease.  LEFT lung parenchyma clear.    < end of copied text >    < from: CT Angio Chest PE Protocol w/ IV Cont (10.07.24 @ 20:28) >    IMPRESSION: Worsening right-sided consolidation with tree-in-bud nodules;   recommend one-month follow-up.      < end of copied text >    Assessment/Plan:    73M HTN  HLD DM (2) , ESRD on HD  CVA x 2  R HP PEG feeding tube   RLE DVT, now off AC     Admit 8/4 rx for PNA     Moved to ICU  8/8 worsening  resp status  type 1 resp failure requiting intubation followed by bronchoscopy with copious secretions in the R lung  MF PNA  RML/RLL    Extubated today doing well.      BP high.  Start to re introduce meds      D/C steroids    High dose pip/tazo  vanco d/c    DVT P SQH           Date of service  is equal to the date of entry    Patient is a 73y old  Male who presents with a chief complaint of Sepsis and acute hypoxic respiratory failure secondary to suspected aspiration PNA vs. HCAP (10 Oct 2024 17:19)    PAST MEDICAL & SURGICAL HISTORY:  ESRD on hemodialysis      HTN (hypertension)      Insulin dependent type 2 diabetes mellitus      History of cerebrovascular accident (CVA) with residual deficit      History of DVT of lower extremity      Arteriovenous fistula      S/P percutaneous endoscopic gastrostomy (PEG) tube placement      History of tracheostomy        JIMMY BLAND   73y    Male    BRIEF HOSPITAL COURSE:    Review of Systems:   No SOB                    All other ROS are negative.    Allergies    No Known Allergies    Intolerances          ICU Vital Signs Last 24 Hrs  T(C): 36.2 (10 Oct 2024 22:41), Max: 36.7 (10 Oct 2024 09:14)  T(F): 97.2 (10 Oct 2024 22:41), Max: 98.1 (10 Oct 2024 09:14)  HR: 96 (10 Oct 2024 22:41) (65 - 112)  BP: 184/129 (10 Oct 2024 20:00) (110/89 - 184/129)  BP(mean): 147 (10 Oct 2024 20:00) (68 - 147)  ABP: 176/63 (10 Oct 2024 22:41) (101/43 - 199/72)  ABP(mean): 109 (10 Oct 2024 22:41) (63 - 125)  RR: 19 (10 Oct 2024 22:41) (9 - 25)  SpO2: 96% (10 Oct 2024 22:41) (92% - 100%)    O2 Parameters below as of 10 Oct 2024 19:11  Patient On (Oxygen Delivery Method): nasal cannula  O2 Flow (L/min): 2        Physical Examination:    General: NAD    HEENT:  No JVD     PULM: bilateral BS     CVS: s1 s2 reg    ABD:  Peg     EXT: trace edema     SKIN: warm     Neuro: alet awake     ABG - ( 09 Oct 2024 04:59 )  pH, Arterial: 7.41  pH, Blood: x     /  pCO2: 41    /  pO2: 120   / HCO3: 26    / Base Excess: 1.2   /  SaO2: 97.9              Mode: CPAP with PS  FiO2: 30  PEEP: 5  PS: 5  ITime: 1  MAP: 6  PIP: 10    Mode: CPAP with PS, FiO2: 30, PEEP: 5, PS: 5, ITime: 1, MAP: 6, PIP: 10  LABS:                        7.1    14.31 )-----------( 228      ( 10 Oct 2024 13:00 )             21.5     10-10    139  |  102  |  30[H]  ----------------------------<  178[H]  3.6   |  24  |  2.32[H]    Ca    8.8      10 Oct 2024 02:32  Phos  1.7     10-10  Mg     2.2     10-10    TPro  6.7  /  Alb  2.3[L]  /  TBili  0.6  /  DBili  x   /  AST  55[H]  /  ALT  40  /  AlkPhos  103  10-10          CAPILLARY BLOOD GLUCOSE      POCT Blood Glucose.: 230 mg/dL (10 Oct 2024 17:04)  POCT Blood Glucose.: 323 mg/dL (10 Oct 2024 10:48)  POCT Blood Glucose.: 250 mg/dL (10 Oct 2024 05:14)  POCT Blood Glucose.: 151 mg/dL (09 Oct 2024 23:04)      Urinalysis Basic - ( 10 Oct 2024 02:32 )    Color: x / Appearance: x / SG: x / pH: x  Gluc: 178 mg/dL / Ketone: x  / Bili: x / Urobili: x   Blood: x / Protein: x / Nitrite: x   Leuk Esterase: x / RBC: x / WBC x   Sq Epi: x / Non Sq Epi: x / Bacteria: x      CULTURES:  Culture Results:   Few Pseudomonas aeruginosa (10-08 @ 20:40)  Culture Results:   No growth (10-08 @ 20:40)  Culture Results:   No enteric pathogens isolated.  (Stool culture examined for Salmonella,  Shigella, Campylobacter, Aeromonas, Plesiomonas,  Vibrio, E.coli O157 and Yersinia) (10-08 @ 10:00)  Rapid RVP Result: NotDetec (10-08 @ 10:00)  Culture Results:   No Protozoa seen by trichrome stain  No Helminths or Protozoa seen in formalin concentrate  performed by iodine stain  (routine O+P not evaluated for Microsporidia,  Cryptosporidia or Cyclospora)  One negative sample does not necessarily rule  out the presence of a parasitic infection. (10-08 @ 10:00)  Culture Results:   No growth at 48 Hours (10-07 @ 18:35)  Culture Results:   No growth at 48 Hours (10-07 @ 18:30)  Culture Results:   No growth at 5 days (10-04 @ 16:40)  Culture Results:   No growth at 5 days (10-04 @ 16:30)      Medications:  MEDICATIONS  (STANDING):  albuterol/ipratropium for Nebulization 3 milliLiter(s) Nebulizer every 6 hours  aspirin  chewable 81 milliGRAM(s) Enteral Tube daily  atorvastatin 20 milliGRAM(s) Oral at bedtime  chlorhexidine 2% Cloths 1 Application(s) Topical <User Schedule>  fentaNYL   Infusion. 0.5 MICROgram(s)/kG/Hr (2.18 mL/Hr) IV Continuous <Continuous>  ferrous    sulfate Liquid 300 milliGRAM(s) Enteral Tube daily  heparin   Injectable 5000 Unit(s) SubCutaneous every 8 hours  hydrocortisone sodium succinate Injectable 50 milliGRAM(s) IV Push every 6 hours  insulin glargine Injectable (LANTUS) 7 Unit(s) SubCutaneous at bedtime  insulin lispro (ADMELOG) corrective regimen sliding scale   SubCutaneous every 6 hours  Nephro-noam 1 Tablet(s) Oral daily  norepinephrine Infusion 0.05 MICROgram(s)/kG/Min (2.1 mL/Hr) IV Continuous <Continuous>  pantoprazole   Suspension 40 milliGRAM(s) Enteral Tube daily  piperacillin/tazobactam IVPB.. 4.5 Gram(s) IV Intermittent every 12 hours  polyethylene glycol 3350 17 Gram(s) Oral daily  propofol Infusion 40 MICROgram(s)/kG/Min (10.4 mL/Hr) IV Continuous <Continuous>  sodium chloride 3%  Inhalation 4 milliLiter(s) Inhalation every 6 hours    MEDICATIONS  (PRN):  sodium chloride 0.9% lock flush 10 milliLiter(s) IV Push every 1 hour PRN Pre/post blood products, medications, blood draw, and to maintain line patency        10-09 @ 07:01  -  10-10 @ 07:00  --------------------------------------------------------  IN: 2024.3 mL / OUT: 0 mL / NET: 2024.3 mL    10-10 @ 07:01  -  10-10 @ 22:47  --------------------------------------------------------  IN: 637.4 mL / OUT: 0 mL / NET: 637.4 mL        RADIOLOGY/IMAGING/ECHO    < from: Xray Chest 1 View- PORTABLE-Urgent (Xray Chest 1 View- PORTABLE-Urgent .) (10.08.24 @ 19:15) >    IMPRESSION: RIGHT IJ catheter tip in SVC.  Elevated RIGHT hemidiaphragm with RIGHT perihilar/lower zone airspace   disease.  LEFT lung parenchyma clear.    < end of copied text >    < from: CT Angio Chest PE Protocol w/ IV Cont (10.07.24 @ 20:28) >    IMPRESSION: Worsening right-sided consolidation with tree-in-bud nodules;   recommend one-month follow-up.      < end of copied text >    Assessment/Plan:    73M HTN  HLD DM (2) , ESRD on HD  CVA x 2  R HP PEG feeding tube   RLE DVT, now off AC     Admit 10/4 rx for PNA     Moved to ICU  10/8 worsening  resp status  type 1 resp failure requiting intubation followed by bronchoscopy with copious secretions in the R lung  MF PNA  RML/RLL    Extubated today doing well.      BP high.  Start to re introduce meds      D/C steroids    High dose pip/tazo  vanco d/c    DVT P SQH

## 2024-10-10 NOTE — PROGRESS NOTE ADULT - ASSESSMENT
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    Neuro: Follos commands. Comfortable on prop and fent to Rass -1    CV: Septic SHock. Pocus with Euvolemia to hypovolemia  - Titrate levo to map >65.   Respiratory: Acute hypoxic respiratory failure requiring intubation. with RLL pneumonia, likely aspiration. S/p bronchoscopy after intubation with exceedingly high amount of thick white secretions in R mainstem, R lower and R middle lobes. BAL samples sent. Continue zosyn dose increased per ID, GNR in sputum pending s/s. hydrocortisone 50mg q6hrs for severe pneumonia per [facility] trial.  GI: Continue feeds via PEG.  REnal: Hx ESRD on HD. No volume removal today.   Endocrine: Hx DM. ISS and FS q6hrs. Getting lantus 7.   ID: RLL PNA. Likely aspiration. F/u BAL cx. Continue zosyn dose increased GNR in BAL.   Ethics: Full code has been trached before pt was awake alert and able to communicate prior to acute event and wanted full code again this time around.   explained to family that he is likely to be weaker again post and may need re-trach.     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    Neuro: Follos commands. Comfortable on prop and fent to Rass -1    CV: Septic SHock. Pocus with Euvolemia to hypovolemia  - resolved on only 0.01 levo Titrate levo to map >65.   Respiratory: Acute hypoxic respiratory failure requiring intubation. with RLL pneumonia, likely aspiration. S/p bronchoscopy after intubation with exceedingly high amount of thick white secretions in R mainstem, R lower and R middle lobes. BAL samples sent. Continue zosyn dose increased per ID, GNR in sputum pending s/s. hydrocortisone 50mg q6hrs  - significnatly improved will extubate. today.   GI: Continue feeds via PEG.  REnal: Hx ESRD on HD. No volume removal today.   Endocrine: Hx DM. ISS and FS q6hrs. Getting lantus 7.   ID: RLL PNA. with pseudomonas   - On zosyn may need extended course depending on symptoms resolution.   Ethics: Full code has been trached before pt was awake alert and able to communicate prior to acute event and wanted full code again this time around.   explained to family that he is likely to be weaker again post and may need re-trach.     How Severe Is Your Skin Lesion?: mild Is This A New Presentation, Or A Follow-Up?: Skin Lesion

## 2024-10-10 NOTE — PROGRESS NOTE ADULT - SUBJECTIVE AND OBJECTIVE BOX
CHIEF COMPLAINT:Patient is a 73y old  Male who presents with a chief complaint of Sepsis and acute hypoxic respiratory failure secondary to suspected aspiration PNA vs. HCAP (10 Oct 2024 10:44)        Interval Events:    REVIEW OF SYSTEMS: Shakes head no to pain or SOB.   [ ] All other systems negative  [ ] Unable to assess ROS because ________    OBJECTIVE:  ICU Vital Signs Last 24 Hrs  T(C): 36.3 (10 Oct 2024 16:30), Max: 36.7 (10 Oct 2024 09:14)  T(F): 97.3 (10 Oct 2024 16:30), Max: 98.1 (10 Oct 2024 09:14)  HR: 99 (10 Oct 2024 16:30) (65 - 112)  BP: 148/43 (10 Oct 2024 16:00) (108/57 - 155/58)  BP(mean): 68 (10 Oct 2024 16:00) (68 - 116)  ABP: 147/51 (10 Oct 2024 16:30) (101/43 - 165/60)  ABP(mean): 87 (10 Oct 2024 16:30) (63 - 103)  RR: 14 (10 Oct 2024 16:30) (9 - 23)  SpO2: 98% (10 Oct 2024 16:30) (92% - 100%)    O2 Parameters below as of 10 Oct 2024 14:26    O2 Flow (L/min): 2  O2 Concentration (%): 28      Mode: CPAP with PS, FiO2: 30, PEEP: 5, PS: 5, ITime: 1, MAP: 6, PIP: 10    10-09 @ 07:01  -  10-10 @ 07:00  --------------------------------------------------------  IN: 2024.3 mL / OUT: 0 mL / NET: 2024.3 mL    10-10 @ 07:01  -  10-10 @ 17:20  --------------------------------------------------------  IN: 637.4 mL / OUT: 0 mL / NET: 637.4 mL      CAPILLARY BLOOD GLUCOSE      POCT Blood Glucose.: 230 mg/dL (10 Oct 2024 17:04)      PHYSICAL EXAM:  General: NAD comfortabtle   HEENT: RETT and NGT inplace.   Neck: No JVD  Respiratory: CTA b/l..  Cardiovascular: S1 S2 RRR  Abdomen: Sof NT ND  Extremities: No edema or rash, R AVF  Skin: No wounds  Neurological:Able to follow despite sedations  Psychiatry:      LINES:    HOSPITAL MEDICATIONS:  Standing Meds:  albuterol/ipratropium for Nebulization 3 milliLiter(s) Nebulizer every 6 hours  aspirin  chewable 81 milliGRAM(s) Enteral Tube daily  atorvastatin 20 milliGRAM(s) Oral at bedtime  chlorhexidine 2% Cloths 1 Application(s) Topical <User Schedule>  fentaNYL   Infusion. 0.5 MICROgram(s)/kG/Hr IV Continuous <Continuous>  ferrous    sulfate Liquid 300 milliGRAM(s) Enteral Tube daily  heparin   Injectable 5000 Unit(s) SubCutaneous every 8 hours  hydrocortisone sodium succinate Injectable 50 milliGRAM(s) IV Push every 6 hours  insulin glargine Injectable (LANTUS) 7 Unit(s) SubCutaneous at bedtime  insulin lispro (ADMELOG) corrective regimen sliding scale   SubCutaneous every 6 hours  Nephro-noam 1 Tablet(s) Oral daily  norepinephrine Infusion 0.05 MICROgram(s)/kG/Min IV Continuous <Continuous>  pantoprazole   Suspension 40 milliGRAM(s) Enteral Tube daily  piperacillin/tazobactam IVPB.. 4.5 Gram(s) IV Intermittent every 12 hours  polyethylene glycol 3350 17 Gram(s) Oral daily  propofol Infusion 40 MICROgram(s)/kG/Min IV Continuous <Continuous>  sodium chloride 3%  Inhalation 4 milliLiter(s) Inhalation every 6 hours      PRN Meds:  sodium chloride 0.9% lock flush 10 milliLiter(s) IV Push every 1 hour PRN      LABS:                        7.1    14.31 )-----------( 228      ( 10 Oct 2024 13:00 )             21.5     Hgb Trend: 7.1<--, 5.9<--, 5.9<--, 7.3<--, 7.7<--  10-10    139  |  102  |  30[H]  ----------------------------<  178[H]  3.6   |  24  |  2.32[H]    Ca    8.8      10 Oct 2024 02:32  Phos  1.7     10-10  Mg     2.2     10-10    TPro  6.7  /  Alb  2.3[L]  /  TBili  0.6  /  DBili  x   /  AST  55[H]  /  ALT  40  /  AlkPhos  103  10-10    Creatinine Trend: 2.32<--, 3.63<--, 3.28<--, 5.83<--, 5.06<--, 4.79<--    Urinalysis Basic - ( 10 Oct 2024 02:32 )    Color: x / Appearance: x / SG: x / pH: x  Gluc: 178 mg/dL / Ketone: x  / Bili: x / Urobili: x   Blood: x / Protein: x / Nitrite: x   Leuk Esterase: x / RBC: x / WBC x   Sq Epi: x / Non Sq Epi: x / Bacteria: x      Arterial Blood Gas:  10-09 @ 04:59  7.41/41/120/26/97.9/1.2  ABG lactate: --  Arterial Blood Gas:  10-08 @ 20:21  7.42/44/89/28/97.9/3.6  ABG lactate: --        MICROBIOLOGY:     Culture - Fungal, Bronchial (collected 08 Oct 2024 20:40)  Source: Bronchial  Preliminary Report (10 Oct 2024 11:42):    No growth    Culture - Acid Fast - Bronchial w/Smear (collected 08 Oct 2024 20:40)  Source: Bronchial    Culture - Bronchial (collected 08 Oct 2024 20:40)  Source: Bronchial  Gram Stain (prelim) (09 Oct 2024 18:50):    Moderate polymorphonuclear leukocytes seen per low power field    Few Squamous epithelial cells seen per low power field    Few Gram Negative Rods seen per oil power field  Preliminary Report (09 Oct 2024 18:50):    Few Pseudomonas aeruginosa    Culture - Stool (collected 08 Oct 2024 10:00)  Source: .Stool  Final Report (10 Oct 2024 15:50):    No enteric pathogens isolated.    (Stool culture examined for Salmonella,    Shigella, Campylobacter, Aeromonas, Plesiomonas,    Vibrio, E.coli O157 and Yersinia)    Culture - Blood (collected 07 Oct 2024 18:35)  Source: .Blood BLOOD  Preliminary Report (10 Oct 2024 01:01):    No growth at 48 Hours    Culture - Blood (collected 07 Oct 2024 18:30)  Source: .Blood BLOOD  Preliminary Report (10 Oct 2024 01:01):    No growth at 48 Hours      RADIOLOGY:  [ ] Reviewed and interpreted by me    EKG:

## 2024-10-11 LAB
-  AZTREONAM: SIGNIFICANT CHANGE UP
-  CEFEPIME: SIGNIFICANT CHANGE UP
-  CEFTAZIDIME: SIGNIFICANT CHANGE UP
-  CIPROFLOXACIN: SIGNIFICANT CHANGE UP
-  IMIPENEM: SIGNIFICANT CHANGE UP
-  LEVOFLOXACIN: SIGNIFICANT CHANGE UP
-  MEROPENEM: SIGNIFICANT CHANGE UP
-  PIPERACILLIN/TAZOBACTAM: SIGNIFICANT CHANGE UP
ALBUMIN SERPL ELPH-MCNC: 2.3 G/DL — LOW (ref 3.3–5)
ALP SERPL-CCNC: 153 U/L — HIGH (ref 40–120)
ALT FLD-CCNC: 31 U/L — SIGNIFICANT CHANGE UP (ref 12–78)
ANION GAP SERPL CALC-SCNC: 11 MMOL/L — SIGNIFICANT CHANGE UP (ref 5–17)
AST SERPL-CCNC: 32 U/L — SIGNIFICANT CHANGE UP (ref 15–37)
BILIRUB SERPL-MCNC: 0.5 MG/DL — SIGNIFICANT CHANGE UP (ref 0.2–1.2)
BUN SERPL-MCNC: 60 MG/DL — HIGH (ref 7–23)
CALCIUM SERPL-MCNC: 8.9 MG/DL — SIGNIFICANT CHANGE UP (ref 8.5–10.1)
CHLORIDE SERPL-SCNC: 100 MMOL/L — SIGNIFICANT CHANGE UP (ref 96–108)
CO2 SERPL-SCNC: 25 MMOL/L — SIGNIFICANT CHANGE UP (ref 22–31)
CREAT SERPL-MCNC: 3.69 MG/DL — HIGH (ref 0.5–1.3)
EGFR: 17 ML/MIN/1.73M2 — LOW
GAS PNL BLDA: SIGNIFICANT CHANGE UP
GLUCOSE BLDC GLUCOMTR-MCNC: 167 MG/DL — HIGH (ref 70–99)
GLUCOSE BLDC GLUCOMTR-MCNC: 200 MG/DL — HIGH (ref 70–99)
GLUCOSE BLDC GLUCOMTR-MCNC: 221 MG/DL — HIGH (ref 70–99)
GLUCOSE BLDC GLUCOMTR-MCNC: 415 MG/DL — HIGH (ref 70–99)
GLUCOSE BLDC GLUCOMTR-MCNC: 72 MG/DL — SIGNIFICANT CHANGE UP (ref 70–99)
GLUCOSE SERPL-MCNC: 336 MG/DL — HIGH (ref 70–99)
HCT VFR BLD CALC: 22.6 % — LOW (ref 39–50)
HGB BLD-MCNC: 7.5 G/DL — LOW (ref 13–17)
MAGNESIUM SERPL-MCNC: 2.5 MG/DL — SIGNIFICANT CHANGE UP (ref 1.6–2.6)
MCHC RBC-ENTMCNC: 22.2 PG — LOW (ref 27–34)
MCHC RBC-ENTMCNC: 33.2 G/DL — SIGNIFICANT CHANGE UP (ref 32–36)
MCV RBC AUTO: 66.9 FL — LOW (ref 80–100)
METHOD TYPE: SIGNIFICANT CHANGE UP
NRBC # BLD: 0 /100 WBCS — SIGNIFICANT CHANGE UP (ref 0–0)
PHOSPHATE SERPL-MCNC: 3.6 MG/DL — SIGNIFICANT CHANGE UP (ref 2.5–4.5)
PLATELET # BLD AUTO: 226 K/UL — SIGNIFICANT CHANGE UP (ref 150–400)
POTASSIUM SERPL-MCNC: 3.9 MMOL/L — SIGNIFICANT CHANGE UP (ref 3.5–5.3)
POTASSIUM SERPL-SCNC: 3.9 MMOL/L — SIGNIFICANT CHANGE UP (ref 3.5–5.3)
PROT SERPL-MCNC: 6.7 GM/DL — SIGNIFICANT CHANGE UP (ref 6–8.3)
RAPID RVP RESULT: SIGNIFICANT CHANGE UP
RBC # BLD: 3.38 M/UL — LOW (ref 4.2–5.8)
RBC # FLD: 18.5 % — HIGH (ref 10.3–14.5)
SARS-COV-2 RNA SPEC QL NAA+PROBE: SIGNIFICANT CHANGE UP
SODIUM SERPL-SCNC: 136 MMOL/L — SIGNIFICANT CHANGE UP (ref 135–145)
WBC # BLD: 14.33 K/UL — HIGH (ref 3.8–10.5)
WBC # FLD AUTO: 14.33 K/UL — HIGH (ref 3.8–10.5)

## 2024-10-11 PROCEDURE — 99291 CRITICAL CARE FIRST HOUR: CPT

## 2024-10-11 PROCEDURE — 71045 X-RAY EXAM CHEST 1 VIEW: CPT | Mod: 26

## 2024-10-11 PROCEDURE — 99233 SBSQ HOSP IP/OBS HIGH 50: CPT

## 2024-10-11 RX ORDER — AMLODIPINE BESYLATE 10 MG
10 TABLET ORAL DAILY
Refills: 0 | Status: DISCONTINUED | OUTPATIENT
Start: 2024-10-11 | End: 2024-10-15

## 2024-10-11 RX ORDER — SODIUM CHLORIDE 9 MG/ML
10 INJECTION, SOLUTION INTRAMUSCULAR; INTRAVENOUS; SUBCUTANEOUS
Refills: 0 | Status: DISCONTINUED | OUTPATIENT
Start: 2024-10-11 | End: 2024-10-13

## 2024-10-11 RX ORDER — SODIUM NITROPRUSSIDE 50 MG/2ML
1 INJECTION INTRAVENOUS
Qty: 100 | Refills: 0 | Status: DISCONTINUED | OUTPATIENT
Start: 2024-10-11 | End: 2024-10-11

## 2024-10-11 RX ORDER — DILTIAZEM HCL 5 MG/ML
30 VIAL (ML) INTRAVENOUS EVERY 8 HOURS
Refills: 0 | Status: DISCONTINUED | OUTPATIENT
Start: 2024-10-11 | End: 2024-10-12

## 2024-10-11 RX ORDER — SODIUM CHLORIDE 9 MG/ML
4 INJECTION, SOLUTION INTRAMUSCULAR; INTRAVENOUS; SUBCUTANEOUS EVERY 6 HOURS
Refills: 0 | Status: DISCONTINUED | OUTPATIENT
Start: 2024-10-11 | End: 2024-10-14

## 2024-10-11 RX ORDER — HYDRALAZINE HYDROCHLORIDE 50 MG/1
10 TABLET, FILM COATED ORAL EVERY 4 HOURS
Refills: 0 | Status: DISCONTINUED | OUTPATIENT
Start: 2024-10-11 | End: 2024-10-15

## 2024-10-11 RX ORDER — CHLORHEXIDINE GLUCONATE 40 MG/ML
1 SOLUTION TOPICAL
Refills: 0 | Status: DISCONTINUED | OUTPATIENT
Start: 2024-10-11 | End: 2024-10-11

## 2024-10-11 RX ORDER — DILTIAZEM HCL 5 MG/ML
30 VIAL (ML) INTRAVENOUS EVERY 8 HOURS
Refills: 0 | Status: DISCONTINUED | OUTPATIENT
Start: 2024-10-11 | End: 2024-10-11

## 2024-10-11 RX ORDER — ACETAMINOPHEN 500 MG
675 TABLET ORAL ONCE
Refills: 0 | Status: COMPLETED | OUTPATIENT
Start: 2024-10-11 | End: 2024-10-11

## 2024-10-11 RX ORDER — NIFEDIPINE 90 MG
90 TABLET, EXTENDED RELEASE 24 HR ORAL DAILY
Refills: 0 | Status: DISCONTINUED | OUTPATIENT
Start: 2024-10-11 | End: 2024-10-11

## 2024-10-11 RX ORDER — NITROGLYCERIN 0.6MG/HR
50 PATCH, TRANSDERMAL 24 HOURS TRANSDERMAL
Qty: 50 | Refills: 0 | Status: DISCONTINUED | OUTPATIENT
Start: 2024-10-11 | End: 2024-10-12

## 2024-10-11 RX ADMIN — HYDRALAZINE HYDROCHLORIDE 50 MILLIGRAM(S): 50 TABLET, FILM COATED ORAL at 05:49

## 2024-10-11 RX ADMIN — Medication 10 MILLIGRAM(S): at 10:00

## 2024-10-11 RX ADMIN — HEPARIN SODIUM 5000 UNIT(S): 10000 INJECTION INTRAVENOUS; SUBCUTANEOUS at 05:49

## 2024-10-11 RX ADMIN — HEPARIN SODIUM 5000 UNIT(S): 10000 INJECTION INTRAVENOUS; SUBCUTANEOUS at 15:55

## 2024-10-11 RX ADMIN — HYDRALAZINE HYDROCHLORIDE 50 MILLIGRAM(S): 50 TABLET, FILM COATED ORAL at 15:54

## 2024-10-11 RX ADMIN — PIPERACILLIN AND TAZOBACTAM 25 GRAM(S): .5; 4 INJECTION, POWDER, LYOPHILIZED, FOR SOLUTION INTRAVENOUS at 13:12

## 2024-10-11 RX ADMIN — Medication 30 MILLIGRAM(S): at 21:33

## 2024-10-11 RX ADMIN — IPRATROPIUM BROMIDE AND ALBUTEROL SULFATE 3 MILLILITER(S): .5; 2.5 SOLUTION RESPIRATORY (INHALATION) at 11:03

## 2024-10-11 RX ADMIN — CHLORHEXIDINE GLUCONATE 1 APPLICATION(S): 40 SOLUTION TOPICAL at 06:54

## 2024-10-11 RX ADMIN — PANTOPRAZOLE SODIUM 40 MILLIGRAM(S): 40 TABLET, DELAYED RELEASE ORAL at 11:38

## 2024-10-11 RX ADMIN — Medication 30 MILLIGRAM(S): at 13:13

## 2024-10-11 RX ADMIN — SODIUM CHLORIDE 4 MILLILITER(S): 9 INJECTION, SOLUTION INTRAMUSCULAR; INTRAVENOUS; SUBCUTANEOUS at 00:23

## 2024-10-11 RX ADMIN — Medication 675 MILLIGRAM(S): at 16:15

## 2024-10-11 RX ADMIN — PIPERACILLIN AND TAZOBACTAM 25 GRAM(S): .5; 4 INJECTION, POWDER, LYOPHILIZED, FOR SOLUTION INTRAVENOUS at 21:33

## 2024-10-11 RX ADMIN — HEPARIN SODIUM 5000 UNIT(S): 10000 INJECTION INTRAVENOUS; SUBCUTANEOUS at 21:33

## 2024-10-11 RX ADMIN — SODIUM CHLORIDE 4 MILLILITER(S): 9 INJECTION, SOLUTION INTRAMUSCULAR; INTRAVENOUS; SUBCUTANEOUS at 05:22

## 2024-10-11 RX ADMIN — Medication 1 TABLET(S): at 13:13

## 2024-10-11 RX ADMIN — SODIUM CHLORIDE 4 MILLILITER(S): 9 INJECTION, SOLUTION INTRAMUSCULAR; INTRAVENOUS; SUBCUTANEOUS at 23:12

## 2024-10-11 RX ADMIN — Medication 81 MILLIGRAM(S): at 11:38

## 2024-10-11 RX ADMIN — Medication 12: at 05:49

## 2024-10-11 RX ADMIN — Medication 270 MILLIGRAM(S): at 15:20

## 2024-10-11 RX ADMIN — IPRATROPIUM BROMIDE AND ALBUTEROL SULFATE 3 MILLILITER(S): .5; 2.5 SOLUTION RESPIRATORY (INHALATION) at 17:20

## 2024-10-11 RX ADMIN — Medication 15 MICROGRAM(S)/MIN: at 13:16

## 2024-10-11 RX ADMIN — Medication 4: at 11:37

## 2024-10-11 RX ADMIN — Medication 7 UNIT(S): at 21:34

## 2024-10-11 RX ADMIN — Medication 300 MILLIGRAM(S): at 11:38

## 2024-10-11 RX ADMIN — IPRATROPIUM BROMIDE AND ALBUTEROL SULFATE 3 MILLILITER(S): .5; 2.5 SOLUTION RESPIRATORY (INHALATION) at 23:12

## 2024-10-11 RX ADMIN — IPRATROPIUM BROMIDE AND ALBUTEROL SULFATE 3 MILLILITER(S): .5; 2.5 SOLUTION RESPIRATORY (INHALATION) at 05:21

## 2024-10-11 RX ADMIN — Medication 20 MILLIGRAM(S): at 21:33

## 2024-10-11 RX ADMIN — HYDRALAZINE HYDROCHLORIDE 50 MILLIGRAM(S): 50 TABLET, FILM COATED ORAL at 21:33

## 2024-10-11 RX ADMIN — SODIUM CHLORIDE 4 MILLILITER(S): 9 INJECTION, SOLUTION INTRAMUSCULAR; INTRAVENOUS; SUBCUTANEOUS at 17:20

## 2024-10-11 RX ADMIN — IPRATROPIUM BROMIDE AND ALBUTEROL SULFATE 3 MILLILITER(S): .5; 2.5 SOLUTION RESPIRATORY (INHALATION) at 00:23

## 2024-10-11 NOTE — PROGRESS NOTE ADULT - SUBJECTIVE AND OBJECTIVE BOX
[facility]  INFECTIOUS DISEASES   [address] [place] [address]  Tel: [phone]     Fax: [phone]  ==============================================================================  DO Artie Aquino MD Alexandra Gutman, NP   ==============================================================================      JIMMY BLAND  MRN-[id number]  73y ([birth year])      Interval History: patient seen and examined in ICU. was extubated had hemodialysis and then become hypoxic though asymptomatic, still anemic      ROS negative         Allergies  No Known Allergies      ANTIMICROBIALS:    piperacillin/tazobactam IVPB.. 4.5 every 12 hours      MEDICATIONS  (STANDING):  albuterol/ipratropium for Nebulization 3 every 6 hours  amLODIPine   Tablet 10 daily  aspirin  chewable 81 daily  atorvastatin 20 at bedtime  diltiazem    Tablet 30 every 8 hours  heparin   Injectable 5000 every 8 hours  hydrALAZINE 50 every 8 hours  insulin glargine Injectable (LANTUS) 7 at bedtime  insulin lispro (ADMELOG) corrective regimen sliding scale  every 6 hours  nitroglycerin  Infusion 50 <Continuous>  pantoprazole   Suspension 40 daily  polyethylene glycol 3350 17 daily  sodium chloride 3%  Inhalation 4 every 6 hours      PRN  hydrALAZINE Injectable 10 milliGRAM(s) IV Push every 4 hours PRN      Physical Exam:  Vital Signs Last 24 Hrs  T(F): 100 (10-11-24 @ 13:45), Max: 100 (10-11-24 @ 13:33)  HR: 104 (10-11-24 @ 13:50)  BP: 218/82 (10-11-24 @ 12:00)  RR: 26 (10-11-24 @ 13:50)  SpO2: 77% (10-11-24 @ 13:50) (75% - 100%)  Wt(kg): --    General:    NAD, extubated and on nasal cannula green tube   Head: atraumatic, normocephalic  Eye: normal sclera and conjunctiva  ENT:    no oral lesions, neck supple, + IJ CVC c.d.i  Cardio:     regular S1, S2,  no murmur  Respiratory:    diminished BS b/l,    wheezing today ETT has been removed   abd:     soft,   BS +,   no tenderness, + peg   :   no CVAT,  no suprapubic tenderness,    Musculoskeletal:   no joint swelling,   no edema  vascular: +central lines, +PIV, RUE AV fistula   Skin:    no rash  Neurologic:     awake, calm, pleasant   psych: normal affect      CBC  WBC Count: 14.33 K/uL (10-11 @ 03:40)  WBC Count: 14.31 K/uL (10-10 @ 13:00)  WBC Count: 14.56 K/uL (10-10 @ 03:35)  WBC Count: 14.70 K/uL (10-10 @ 02:32)  WBC Count: 25.96 K/uL (10-09 @ 02:30)  WBC Count: 32.85 K/uL (10-08 @ 20:10)  WBC Count: 20.55 K/uL (10-08 @ 12:30)                            7.5    14.33 )-----------( 226      ( 11 Oct 2024 03:40 )             22.6       BMP/CMP  10-11    136  |  100  |  60[H]  ----------------------------<  336[H]  3.9   |  25  |  3.69[H]    Ca    8.9      11 Oct 2024 03:40  Phos  3.6     10-11  Mg     2.5     10-11    TPro  6.7  /  Alb  2.3[L]  /  TBili  0.5  /  DBili  x   /  AST  32  /  ALT  31  /  AlkPhos  153[H]  10-11      Creatinine Trend: 3.69<--, 2.32<--, 3.63<--, 3.28<--, 5.83<--, 5.06<--        Blood Gas Arterial, Lactate: 1.70 mmol/L (10-11-24 @ 13:51)        MICROBIOLOGY:  Bronchial  10-08-24   Few Pseudomonas aeruginosa  --    Moderate polymorphonuclear leukocytes seen per low power field  Few Squamous epithelial cells seen per low power field  Few Gram Negative Rods seen per oil power field      .Stool  10-08-24   No Protozoa seen by trichrome stain  No Helminths or Protozoa seen in formalin concentrate  performed by iodine stain  (routine O+P not evaluated for Microsporidia,  Cryptosporidia or Cyclospora)  One negative sample does not necessarily rule  out the presence of a parasitic infection.  --  --      .Blood BLOOD  10-07-24   No growth at 48 Hours  --  --      .Blood BLOOD  10-07-24   No growth at 48 Hours  --  --      .Blood BLOOD  10-04-24   No growth at 5 days  --  --      .Blood BLOOD  10-04-24   No growth at 5 days  --  --        Rapid RVP Result: NotDetec (10-08 @ 10:00)    Procalcitonin: 27.40 (10-07-24 @ 18:35)  Procalcitonin: 22.50 (10-05-24 @ 08:37)    Rapid RVP Result: NotDetec (10-08-24 @ 10:00)  SARS-CoV-2: NotDetec (10-08-24 @ 10:00)        RADIOLOGY:    ACC: [id number] EXAM:  XR CHEST PORTABLE URGENT 1V   ORDERED BY: DARIANA HOPKINS     PROCEDURE DATE:  10/08/2024          INTERPRETATION:  Portable AP chest radiograph    COMPARISON: 10/8/2024 and 10/7/2024 chest and CT the chest imaging..    CLINICAL INFORMATION: Line placement.    FINDINGS:  CATHETERS AND TUBES: RIGHT IJ catheter tip in SVC.    PULMONARY: Elevated RIGHT hemidiaphragm Elevated RIGHT hemidiaphragm with   RIGHT perihilar/lower zone airspace disease.  LEFT lung parenchyma clear.      HEART/VASCULAR: The heart size and mediastinum configuration are within   the limits of normal.    BONES: The visualized osseous thorax is intact.    IMPRESSION: RIGHT IJ catheter tip in SVC.  Elevated RIGHT hemidiaphragm with RIGHT perihilar/lower zone airspace   disease.  LEFT lung parenchyma clear.        ITALIA MICHELLE MD  This document has been electronically signed. Oct  9 2024  1:19PM      ACC: [id number] EXAM:  XR CHEST PORTABLE URGENT 1V   ORDERED BY: PRASAD SPENCER     PROCEDURE DATE:  10/08/2024          INTERPRETATION:  Portable AP chest radiograph    COMPARISON: 10/4/2024 chest x-ray.    CLINICAL INFORMATION: Status post bronchoscopy. RIGHT lower lobe   pneumonia..    FINDINGS:  CATHETERS AND TUBES: ET tube tip above tracheal bifurcation.    PULMONARY:  Residual RIGHT perihilar/lower zone and diffuse airspace disease .  LEFT lung parenchyma clear.  No pneumothorax.      HEART/VASCULAR: The heart size and mediastinum configuration are within   the limits of normal.    BONES: The visualized osseous thorax is intact.    IMPRESSION:  ET tube tip above the bifurcation.  Residual RIGHT perihilar/lower zone diffuse airspace disease .      ITALIA MICHELLE MD  This document has been electronically signed. Oct  9 2024  1:55PM

## 2024-10-11 NOTE — PROGRESS NOTE ADULT - ASSESSMENT
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    Neuro: awake alert.  CV: Septic SHock. Resolved. Now hypertensive. Will restart home BP meds.  Respiratory: Acute hypoxic respiratory failure requiring intubation. with RLL pneumonia. now extubated. doing well.  GI: Continue feeds via PEG.  REnal: Hx ESRD on HD. No volume removal today.   Endocrine: Hx DM. ISS and FS q6hrs. Getting lantus 7. Stress steroids d/c yesterday hopefully glucose better controlled now.  ID: RLL PNA. with pseudomonas. On zosyn  Likely downgrade if remain stable.

## 2024-10-11 NOTE — PROGRESS NOTE ADULT - ASSESSMENT
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      Plan  Pt extubated yesterday 10/11  Was doing well earlier however now with recurrent R mucous plugging requiring HFNC, aggressive chest PT and pulmonary toilet/suction with inhaled nebulizers / sodium chlroide   L lung down currently to improve oxygenation   have now been able to wean to a flow of 60 and 50% fi02 however remains at risk for re-intubation and or repeat bronchoscopy   cont zosyn for pna   anti-medication restarted , keep SBP less than 180  currently off nitro infusion   tube feeds as tolerated, PPI  heparin + SCDs for dvt ppx   ISS q6. 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      Plan  Pt extubated yesterday 10/11  Was doing well earlier however now with recurrent R mucous plugging requiring HFNC, aggressive chest PT and pulmonary toilet/suction with inhaled nebulizers / sodium chlroide   L lung down currently to improve oxygenation   have now been able to wean to a flow of 60 and 50% fi02 however remains at risk for re-intubation and or repeat bronchoscopy   cont zosyn for pna   anti-medication restarted , keep SBP less than 180  currently off nitro infusion   HD terminated when he became hypoxic and hypertensive   can reattempt when hemodynamics improved   tube feeds as tolerated, PPI  heparin + SCDs for dvt ppx   ISS q6. 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      Plan  Pt extubated yesterday 10/11  Was doing well earlier however now with recurrent R mucous plugging requiring HFNC, aggressive chest PT and pulmonary toilet/suction with inhaled nebulizers / sodium chlroide   L lung down currently to improve oxygenation   have now been able to wean to a flow of 60 and 50% fi02 however remains at risk for re-intubation and or repeat bronchoscopy   cont zosyn for pna   anti-medication restarted , keep SBP less than 180  currently off nitro infusion   HD terminated when he became hypoxic and hypertensive   can reattempt when hemodynamics improved   tube feeds as tolerated, PPI  heparin + SCDs for dvt ppx . s/p 2 units PRBC   ISS q6. hold lantus tonight   Pt is full code and remains in ICU

## 2024-10-11 NOTE — PROGRESS NOTE ADULT - SUBJECTIVE AND OBJECTIVE BOX
Date of entry of this note is equal to the date of services rendered     Patient is a 73y old  Male who presents with a chief complaint of Sepsis and acute hypoxic respiratory failure secondary to suspected aspiration PNA vs. HCAP (11 Oct 2024 17:15)      BRIEF HOSPITAL COURSE:   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      Events last 24 hours:   Had desaturation event requiring HFNC , chest PT      Social history:    PAST MEDICAL & SURGICAL HISTORY:  ESRD on hemodialysis      HTN (hypertension)      Insulin dependent type 2 diabetes mellitus      History of cerebrovascular accident (CVA) with residual deficit      History of DVT of lower extremity      Arteriovenous fistula      S/P percutaneous endoscopic gastrostomy (PEG) tube placement      History of tracheostomy        Allergies    No Known Allergies    Intolerances      FAMILY HISTORY:  FHx: diabetes mellitus (Father, Aunt)        Review of Systems:  CONSTITUTIONAL: No fever, chills, or fatigue  EYES: No eye pain, visual disturbances, or discharge  ENMT:  No difficulty hearing, tinnitus, vertigo; No sinus or throat pain  NECK: No pain or stiffness  RESPIRATORY: No cough, wheezing, chills or hemoptysis;  + shortness of breath  CARDIOVASCULAR: No chest pain, palpitations, dizziness, or leg swelling  GASTROINTESTINAL: No abdominal or epigastric pain. No nausea, vomiting, or hematemesis; No diarrhea or constipation. No melena or hematochezia.  GENITOURINARY: No dysuria, frequency, hematuria, or incontinence  NEUROLOGICAL: No headaches, memory loss, loss of strength, numbness, or tremors  SKIN: No itching, burning, rashes, or lesions   MUSCULOSKELETAL: No joint pain or swelling; No muscle, back, or extremity pain  PSYCHIATRIC: No depression, anxiety, mood swings, or difficulty sleeping      Medications:  piperacillin/tazobactam IVPB.. 4.5 Gram(s) IV Intermittent every 12 hours    amLODIPine   Tablet 10 milliGRAM(s) Oral daily  diltiazem    Tablet 30 milliGRAM(s) Oral every 8 hours  hydrALAZINE 50 milliGRAM(s) Oral every 8 hours  hydrALAZINE Injectable 10 milliGRAM(s) IV Push every 4 hours PRN  nitroglycerin  Infusion 50 MICROgram(s)/Min IV Continuous <Continuous>    albuterol/ipratropium for Nebulization 3 milliLiter(s) Nebulizer every 6 hours  sodium chloride 3%  Inhalation 4 milliLiter(s) Inhalation every 6 hours  sodium chloride 3%  Inhalation 4 milliLiter(s) Inhalation every 6 hours        aspirin  chewable 81 milliGRAM(s) Enteral Tube daily  heparin   Injectable 5000 Unit(s) SubCutaneous every 8 hours    pantoprazole   Suspension 40 milliGRAM(s) Enteral Tube daily  polyethylene glycol 3350 17 Gram(s) Oral daily      atorvastatin 20 milliGRAM(s) Oral at bedtime  insulin glargine Injectable (LANTUS) 7 Unit(s) SubCutaneous at bedtime  insulin lispro (ADMELOG) corrective regimen sliding scale   SubCutaneous every 6 hours    ferrous    sulfate Liquid 300 milliGRAM(s) Enteral Tube daily  Nephro-noam 1 Tablet(s) Oral daily  sodium chloride 0.9% lock flush 10 milliLiter(s) IV Push every 1 hour PRN      chlorhexidine 2% Cloths 1 Application(s) Topical <User Schedule>            ICU Vital Signs Last 24 Hrs  T(C): 37.5 (11 Oct 2024 21:00), Max: 39.1 (11 Oct 2024 15:30)  T(F): 99.5 (11 Oct 2024 21:00), Max: 102.4 (11 Oct 2024 15:30)  HR: 94 (11 Oct 2024 21:00) (83 - 128)  BP: 184/73 (11 Oct 2024 16:00) (144/98 - 267/236)  BP(mean): 101 (11 Oct 2024 16:00) (85 - 247)  ABP: 179/59 (11 Oct 2024 21:00) (153/49 - 225/83)  ABP(mean): 107 (11 Oct 2024 21:00) (86 - 140)  RR: 31 (11 Oct 2024 21:00) (15 - 33)  SpO2: 98% (11 Oct 2024 21:00) (66% - 100%)    O2 Parameters below as of 11 Oct 2024 20:30  Patient On (Oxygen Delivery Method): nasal cannula, high flow  O2 Flow (L/min): 60  O2 Concentration (%): 50      Vital Signs Last 24 Hrs  T(C): 37.5 (11 Oct 2024 21:00), Max: 39.1 (11 Oct 2024 15:30)  T(F): 99.5 (11 Oct 2024 21:00), Max: 102.4 (11 Oct 2024 15:30)  HR: 94 (11 Oct 2024 21:00) (83 - 128)  BP: 184/73 (11 Oct 2024 16:00) (144/98 - 267/236)  BP(mean): 101 (11 Oct 2024 16:00) (85 - 247)  RR: 31 (11 Oct 2024 21:00) (15 - 33)  SpO2: 98% (11 Oct 2024 21:00) (66% - 100%)    Parameters below as of 11 Oct 2024 20:30  Patient On (Oxygen Delivery Method): nasal cannula, high flow  O2 Flow (L/min): 60  O2 Concentration (%): 50    ABG - ( 11 Oct 2024 13:51 )  pH, Arterial: 7.53  pH, Blood: x     /  pCO2: 34    /  pO2: 45    / HCO3: 28    / Base Excess: 5.5   /  SaO2: 79.4                I&O's Detail    10 Oct 2024 07:01  -  11 Oct 2024 07:00  --------------------------------------------------------  IN:    Enteral Tube Flush: 150 mL    FentaNYL: 29 mL    IV PiggyBack: 500 mL    Nepro with Carb Steady: 820 mL    Norepinephrine: 1.6 mL    Propofol: 26.8 mL  Total IN: 1527.4 mL    OUT:  Total OUT: 0 mL    Total NET: 1527.4 mL      11 Oct 2024 07:01  -  11 Oct 2024 22:28  --------------------------------------------------------  IN:    IV PiggyBack: 100 mL    Nitroglycerin: 114 mL  Total IN: 214 mL    OUT:    Other (mL): 1000 mL  Total OUT: 1000 mL    Total NET: -786 mL            LABS:                        7.5    14.33 )-----------( 226      ( 11 Oct 2024 03:40 )             22.6     10-11    136  |  100  |  60[H]  ----------------------------<  336[H]  3.9   |  25  |  3.69[H]    Ca    8.9      11 Oct 2024 03:40  Phos  3.6     10-11  Mg     2.5     10-11    TPro  6.7  /  Alb  2.3[L]  /  TBili  0.5  /  DBili  x   /  AST  32  /  ALT  31  /  AlkPhos  153[H]  10-11          CAPILLARY BLOOD GLUCOSE      POCT Blood Glucose.: 167 mg/dL (11 Oct 2024 21:31)      Urinalysis Basic - ( 11 Oct 2024 03:40 )    Color: x / Appearance: x / SG: x / pH: x  Gluc: 336 mg/dL / Ketone: x  / Bili: x / Urobili: x   Blood: x / Protein: x / Nitrite: x   Leuk Esterase: x / RBC: x / WBC x   Sq Epi: x / Non Sq Epi: x / Bacteria: x      CULTURES:  Culture Results:   Few Pseudomonas aeruginosa  Moderate Stenotrophomonas maltophilia (10-08 @ 20:40)  Culture Results:   No growth (10-08 @ 20:40)  Culture Results:   No enteric pathogens isolated.  (Stool culture examined for Salmonella,  Shigella, Campylobacter, Aeromonas, Plesiomonas,  Vibrio, E.coli O157 and Yersinia) (10-08 @ 10:00)  Culture Results:   No Protozoa seen by trichrome stain  No Helminths or Protozoa seen in formalin concentrate  performed by iodine stain  (routine O+P not evaluated for Microsporidia,  Cryptosporidia or Cyclospora)  One negative sample does not necessarily rule  out the presence of a parasitic infection. (10-08 @ 10:00)  Culture Results:   No growth at 72 Hours (10-07 @ 18:35)  Culture Results:   No growth at 72 Hours (10-07 @ 18:30)      Physical Examination:    General: elderly male in bed, ill appearing     HEENT: Pupils equal, reactive to light.  Symmetric.    PULM: coarse breath sounds R>L    CVS: +s1/s2    ABD: Soft    EXT: No edema    SKIN: Warm    Neuro: awake     RADIOLOGY:   < from: Xray Chest 1 View- PORTABLE-Urgent (Xray Chest 1 View- PORTABLE-Urgent .) (10.08.24 @ 19:15) >  INTERPRETATION:  Portable AP chest radiograph    COMPARISON: 10/8/2024 and 10/7/2024 chest and CT the chest imaging..    CLINICAL INFORMATION: Line placement.    FINDINGS:  CATHETERS AND TUBES: RIGHT IJ catheter tip in SVC.    PULMONARY: Elevated RIGHT hemidiaphragm Elevated RIGHT hemidiaphragm with   RIGHT perihilar/lower zone airspace disease.  LEFT lung parenchyma clear.      HEART/VASCULAR: The heart size and mediastinum configuration are within   the limits of normal.    BONES: The visualized osseous thorax is intact.    IMPRESSION: RIGHT IJ catheter tip in SVC.  Elevated RIGHT hemidiaphragm with RIGHT perihilar/lower zone airspace   disease.  LEFT lung parenchyma clear.        Time spent on this patient encounter, which includes documenting this note in the electronic medical record, was 50 minutes including assessing the presenting problems with associated risks, reviewing the medical record to prepare for the encounter, and meeting face to face with the patient to obtain additional history.  I have also performed an appropriate physical exam, made interventions listed and ordered and interpreted appropriate diagnostic studies as documented.     To improve communication and patient safety, I have coordinated care with the multidisciplinary team including the bedside nurse, appropriate attending of record and consultants as needed.       Date of entry of this note is equal to the date of services rendered     Patient is a 73y old  Male who presents with a chief complaint of Sepsis and acute hypoxic respiratory failure secondary to suspected aspiration PNA vs. HCAP (11 Oct 2024 17:15)      BRIEF HOSPITAL COURSE:   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      Events last 24 hours:   Had desaturation event requiring HFNC , chest PT  HD terminated when he became hypoxic and hypertensive       Social history:    PAST MEDICAL & SURGICAL HISTORY:  ESRD on hemodialysis      HTN (hypertension)      Insulin dependent type 2 diabetes mellitus      History of cerebrovascular accident (CVA) with residual deficit      History of DVT of lower extremity      Arteriovenous fistula      S/P percutaneous endoscopic gastrostomy (PEG) tube placement      History of tracheostomy        Allergies    No Known Allergies    Intolerances      FAMILY HISTORY:  FHx: diabetes mellitus (Father, Aunt)        Review of Systems:  CONSTITUTIONAL: No fever, chills, or fatigue  EYES: No eye pain, visual disturbances, or discharge  ENMT:  No difficulty hearing, tinnitus, vertigo; No sinus or throat pain  NECK: No pain or stiffness  RESPIRATORY: No cough, wheezing, chills or hemoptysis;  + shortness of breath  CARDIOVASCULAR: No chest pain, palpitations, dizziness, or leg swelling  GASTROINTESTINAL: No abdominal or epigastric pain. No nausea, vomiting, or hematemesis; No diarrhea or constipation. No melena or hematochezia.  GENITOURINARY: No dysuria, frequency, hematuria, or incontinence  NEUROLOGICAL: No headaches, memory loss, loss of strength, numbness, or tremors  SKIN: No itching, burning, rashes, or lesions   MUSCULOSKELETAL: No joint pain or swelling; No muscle, back, or extremity pain  PSYCHIATRIC: No depression, anxiety, mood swings, or difficulty sleeping      Medications:  piperacillin/tazobactam IVPB.. 4.5 Gram(s) IV Intermittent every 12 hours    amLODIPine   Tablet 10 milliGRAM(s) Oral daily  diltiazem    Tablet 30 milliGRAM(s) Oral every 8 hours  hydrALAZINE 50 milliGRAM(s) Oral every 8 hours  hydrALAZINE Injectable 10 milliGRAM(s) IV Push every 4 hours PRN  nitroglycerin  Infusion 50 MICROgram(s)/Min IV Continuous <Continuous>    albuterol/ipratropium for Nebulization 3 milliLiter(s) Nebulizer every 6 hours  sodium chloride 3%  Inhalation 4 milliLiter(s) Inhalation every 6 hours  sodium chloride 3%  Inhalation 4 milliLiter(s) Inhalation every 6 hours        aspirin  chewable 81 milliGRAM(s) Enteral Tube daily  heparin   Injectable 5000 Unit(s) SubCutaneous every 8 hours    pantoprazole   Suspension 40 milliGRAM(s) Enteral Tube daily  polyethylene glycol 3350 17 Gram(s) Oral daily      atorvastatin 20 milliGRAM(s) Oral at bedtime  insulin glargine Injectable (LANTUS) 7 Unit(s) SubCutaneous at bedtime  insulin lispro (ADMELOG) corrective regimen sliding scale   SubCutaneous every 6 hours    ferrous    sulfate Liquid 300 milliGRAM(s) Enteral Tube daily  Nephro-noam 1 Tablet(s) Oral daily  sodium chloride 0.9% lock flush 10 milliLiter(s) IV Push every 1 hour PRN      chlorhexidine 2% Cloths 1 Application(s) Topical <User Schedule>            ICU Vital Signs Last 24 Hrs  T(C): 37.5 (11 Oct 2024 21:00), Max: 39.1 (11 Oct 2024 15:30)  T(F): 99.5 (11 Oct 2024 21:00), Max: 102.4 (11 Oct 2024 15:30)  HR: 94 (11 Oct 2024 21:00) (83 - 128)  BP: 184/73 (11 Oct 2024 16:00) (144/98 - 267/236)  BP(mean): 101 (11 Oct 2024 16:00) (85 - 247)  ABP: 179/59 (11 Oct 2024 21:00) (153/49 - 225/83)  ABP(mean): 107 (11 Oct 2024 21:00) (86 - 140)  RR: 31 (11 Oct 2024 21:00) (15 - 33)  SpO2: 98% (11 Oct 2024 21:00) (66% - 100%)    O2 Parameters below as of 11 Oct 2024 20:30  Patient On (Oxygen Delivery Method): nasal cannula, high flow  O2 Flow (L/min): 60  O2 Concentration (%): 50      Vital Signs Last 24 Hrs  T(C): 37.5 (11 Oct 2024 21:00), Max: 39.1 (11 Oct 2024 15:30)  T(F): 99.5 (11 Oct 2024 21:00), Max: 102.4 (11 Oct 2024 15:30)  HR: 94 (11 Oct 2024 21:00) (83 - 128)  BP: 184/73 (11 Oct 2024 16:00) (144/98 - 267/236)  BP(mean): 101 (11 Oct 2024 16:00) (85 - 247)  RR: 31 (11 Oct 2024 21:00) (15 - 33)  SpO2: 98% (11 Oct 2024 21:00) (66% - 100%)    Parameters below as of 11 Oct 2024 20:30  Patient On (Oxygen Delivery Method): nasal cannula, high flow  O2 Flow (L/min): 60  O2 Concentration (%): 50    ABG - ( 11 Oct 2024 13:51 )  pH, Arterial: 7.53  pH, Blood: x     /  pCO2: 34    /  pO2: 45    / HCO3: 28    / Base Excess: 5.5   /  SaO2: 79.4                I&O's Detail    10 Oct 2024 07:01  -  11 Oct 2024 07:00  --------------------------------------------------------  IN:    Enteral Tube Flush: 150 mL    FentaNYL: 29 mL    IV PiggyBack: 500 mL    Nepro with Carb Steady: 820 mL    Norepinephrine: 1.6 mL    Propofol: 26.8 mL  Total IN: 1527.4 mL    OUT:  Total OUT: 0 mL    Total NET: 1527.4 mL      11 Oct 2024 07:01  -  11 Oct 2024 22:28  --------------------------------------------------------  IN:    IV PiggyBack: 100 mL    Nitroglycerin: 114 mL  Total IN: 214 mL    OUT:    Other (mL): 1000 mL  Total OUT: 1000 mL    Total NET: -786 mL            LABS:                        7.5    14.33 )-----------( 226      ( 11 Oct 2024 03:40 )             22.6     10-11    136  |  100  |  60[H]  ----------------------------<  336[H]  3.9   |  25  |  3.69[H]    Ca    8.9      11 Oct 2024 03:40  Phos  3.6     10-11  Mg     2.5     10-11    TPro  6.7  /  Alb  2.3[L]  /  TBili  0.5  /  DBili  x   /  AST  32  /  ALT  31  /  AlkPhos  153[H]  10-11          CAPILLARY BLOOD GLUCOSE      POCT Blood Glucose.: 167 mg/dL (11 Oct 2024 21:31)      Urinalysis Basic - ( 11 Oct 2024 03:40 )    Color: x / Appearance: x / SG: x / pH: x  Gluc: 336 mg/dL / Ketone: x  / Bili: x / Urobili: x   Blood: x / Protein: x / Nitrite: x   Leuk Esterase: x / RBC: x / WBC x   Sq Epi: x / Non Sq Epi: x / Bacteria: x      CULTURES:  Culture Results:   Few Pseudomonas aeruginosa  Moderate Stenotrophomonas maltophilia (10-08 @ 20:40)  Culture Results:   No growth (10-08 @ 20:40)  Culture Results:   No enteric pathogens isolated.  (Stool culture examined for Salmonella,  Shigella, Campylobacter, Aeromonas, Plesiomonas,  Vibrio, E.coli O157 and Yersinia) (10-08 @ 10:00)  Culture Results:   No Protozoa seen by trichrome stain  No Helminths or Protozoa seen in formalin concentrate  performed by iodine stain  (routine O+P not evaluated for Microsporidia,  Cryptosporidia or Cyclospora)  One negative sample does not necessarily rule  out the presence of a parasitic infection. (10-08 @ 10:00)  Culture Results:   No growth at 72 Hours (10-07 @ 18:35)  Culture Results:   No growth at 72 Hours (10-07 @ 18:30)      Physical Examination:    General: elderly male in bed, ill appearing     HEENT: Pupils equal, reactive to light.  Symmetric.    PULM: coarse breath sounds R>L    CVS: +s1/s2    ABD: Soft    EXT: No edema    SKIN: Warm    Neuro: awake     RADIOLOGY:   < from: Xray Chest 1 View- PORTABLE-Urgent (Xray Chest 1 View- PORTABLE-Urgent .) (10.08.24 @ 19:15) >  INTERPRETATION:  Portable AP chest radiograph    COMPARISON: 10/8/2024 and 10/7/2024 chest and CT the chest imaging..    CLINICAL INFORMATION: Line placement.    FINDINGS:  CATHETERS AND TUBES: RIGHT IJ catheter tip in SVC.    PULMONARY: Elevated RIGHT hemidiaphragm Elevated RIGHT hemidiaphragm with   RIGHT perihilar/lower zone airspace disease.  LEFT lung parenchyma clear.      HEART/VASCULAR: The heart size and mediastinum configuration are within   the limits of normal.    BONES: The visualized osseous thorax is intact.    IMPRESSION: RIGHT IJ catheter tip in SVC.  Elevated RIGHT hemidiaphragm with RIGHT perihilar/lower zone airspace   disease.  LEFT lung parenchyma clear.        Time spent on this patient encounter, which includes documenting this note in the electronic medical record, was 50 minutes including assessing the presenting problems with associated risks, reviewing the medical record to prepare for the encounter, and meeting face to face with the patient to obtain additional history.  I have also performed an appropriate physical exam, made interventions listed and ordered and interpreted appropriate diagnostic studies as documented.     To improve communication and patient safety, I have coordinated care with the multidisciplinary team including the bedside nurse, appropriate attending of record and consultants as needed.

## 2024-10-11 NOTE — PROGRESS NOTE ADULT - SUBJECTIVE AND OBJECTIVE BOX
INTERVAL HPI/OVERNIGHT EVENTS:   HPI:  Megha Art is a 73 year old male with PMHx of HTN, IDDM2, ESRD on HD (M/W/F, through RUE AV fistula), hx of CVA x 2 (with residual R. sided weakness and dysphagia s/p PEG tube, previously with tracheostomy and now removed), and hx of RLE DVT (completed apixaban course) who presented to the ED on 10/4/24 for complaints of cough and chills.    History obtained from wife at bedside. As per wife, patient typically coughs at baseline since he previously had a tracheostomy. However, for the past 2-3 days, he has been coughing more frequently and it has been productive with yellow phlegm. Wife was assisting him with getting dressed this afternoon around 1 PM when he felt nauseous and started complaining of abdominal pain. Then had a bowel movement. When wife was cleaning him up, patient was shaking "like he had chills" and she thinks his eyes rolled back for a few seconds. No loss of consciousness. EMS was called and another episode of shaking upon EMS arrival. Of note, patient is due for dialysis today but did not receive it since he came to the ER today. Baseline functional status is wheelchair bound and dependent with all ADLs. NPO with Nepro q4. Lives at home with wife.     In the ED, Tmax 101.3, HR as elevated as 118, BP as elevated as 201/68, and SpO2 as low as 91% on room air. Initially placed on 15L NRB with improvement of SpO2 to 95%. Now on room air. WBC 17.38K, hgb 9.9, sodium 129, BUN 61, Cr 4.05, alkphos 169. Lactic acid 3.6. CT head without evidence of acute hemorrhage mass or mass effect but with encephalomalacia and gliosis again seen involving the bifrontal region. CT CAP with pneumonia and rectum distended with stool. Received  cc bolus, vancomycin 1 g IV, zosyn 3.375 g IV, acetaminophen 1 g IV, hydralazine 10 mg IV, pantoprazole 40 mg IV, and ondansetron 4 mg IV. Evaluated by nephrology who is planning for HD tomorrow. (04 Oct 2024 22:03)      CENTRAL LINE: [ ] YES [ ] NO  LOCATION:   DATE INSERTED:  REMOVE: [ ] YES [ ] NO  EXPLAIN:    [facility]: [ ] YES [ ] NO    DATE INSERTED:  REMOVE:  [ ] YES [ ] NO  EXPLAIN:    A-LINE:  [ ] YES [ ] NO  LOCATION:   DATE INSERTED:  REMOVE:  [ ] YES [ ] NO  EXPLAIN:    PAST MEDICAL & SURGICAL HISTORY:  ESRD on hemodialysis      HTN (hypertension)      Insulin dependent type 2 diabetes mellitus      History of cerebrovascular accident (CVA) with residual deficit      History of DVT of lower extremity      Arteriovenous fistula      S/P percutaneous endoscopic gastrostomy (PEG) tube placement      History of tracheostomy          REVIEW OF SYSTEMS:    Negative ROS aside from HPI/Interval events above.    ICU Vital Signs Last 24 Hrs  T(C): 36.9 (11 Oct 2024 11:00), Max: 37.2 (11 Oct 2024 06:30)  T(F): 98.4 (11 Oct 2024 11:00), Max: 99 (11 Oct 2024 06:30)  HR: 86 (11 Oct 2024 11:00) (81 - 112)  BP: 205/70 (11 Oct 2024 10:00) (140/55 - 212/142)  BP(mean): 103 (11 Oct 2024 10:00) (68 - 162)  ABP: 204/67 (11 Oct 2024 11:00) (132/49 - 204/67)  ABP(mean): 119 (11 Oct 2024 11:00) (78 - 125)  RR: 22 (11 Oct 2024 11:00) (9 - 29)  SpO2: 95% (11 Oct 2024 11:00) (92% - 100%)    O2 Parameters below as of 11 Oct 2024 10:40  Patient On (Oxygen Delivery Method): nasal cannula                I&O's Detail    10 Oct 2024 07:01  -  11 Oct 2024 07:00  --------------------------------------------------------  IN:    Enteral Tube Flush: 150 mL    FentaNYL: 29 mL    IV PiggyBack: 500 mL    Nepro with Carb Steady: 820 mL    Norepinephrine: 1.6 mL    Propofol: 26.8 mL  Total IN: 1527.4 mL    OUT:  Total OUT: 0 mL    Total NET: 1527.4 mL          Mode: CPAP with PS  FiO2: 30  PEEP: 5  PS: 5  ITime: 1  MAP: 6  PIP: 10    CAPILLARY BLOOD GLUCOSE      POCT Blood Glucose.: 415 mg/dL (11 Oct 2024 05:35)  POCT Blood Glucose.: 370 mg/dL (10 Oct 2024 23:01)  POCT Blood Glucose.: 230 mg/dL (10 Oct 2024 17:04)        PHYSICAL EXAM:    General: NAD comfortabtle    Neck: No JVD  Respiratory: CTA b/l.  Cardiovascular: S1 S2 RRR  Abdomen: Sof NT ND  Extremities: No edema or rash, R AVF  Skin: No wounds  Neurological: awakens. alert. calm. cooperative. Answers.      LABS:                        7.5    14.33 )-----------( 226      ( 11 Oct 2024 03:40 )             22.6      10-11    136  |  100  |  60[H]  ----------------------------<  336[H]  3.9   |  25  |  3.69[H]    Ca    8.9      11 Oct 2024 03:40  Phos  3.6     10-11  Mg     2.5     10-11    TPro  6.7  /  Alb  2.3[L]  /  TBili  0.5  /  DBili  x   /  AST  32  /  ALT  31  /  AlkPhos  153[H]  10-11      Urinalysis Basic - ( 11 Oct 2024 03:40 )    Color: x / Appearance: x / SG: x / pH: x  Gluc: 336 mg/dL / Ketone: x  / Bili: x / Urobili: x   Blood: x / Protein: x / Nitrite: x   Leuk Esterase: x / RBC: x / WBC x   Sq Epi: x / Non Sq Epi: x / Bacteria: x      Culture Results:   Few Pseudomonas aeruginosa (10-08 @ 20:40)  Culture Results:   No growth (10-08 @ 20:40)

## 2024-10-11 NOTE — PROGRESS NOTE ADULT - ASSESSMENT
This is a 73 year old man with history of HTN, IDDM2, ESRD on HD (M/W/F, through RUE AV fistula), hx of CVA x 2 (with residual R sided weakness and dysphagia s/p PEG tube, previously with tracheostomy and now removed), and hx of RLE DVT (completed apixaban course) who presented to the ED on 10/4/24 for complaints of cough and chills. Baseline functional status is wheelchair bound and dependent with all ADLs. NPO with Nepro q4. Lives at home with wife. Initially placed on 15L NRB with improvement of SpO2 to 95%. Then changed to high flow nasal cannula. Desaturated on 10/8, and switched to BiPAP  Patient was admitted in July to the hospital and received 7 days of antibiotics   He grew citrobacter that was quite sensitive but could have more resistant organisms given the hospitalization   additionally he is a hemodialysis patient getting hemodialysis 3 days a week and has a degree of being immunocompromised     10/9: intubated, sedated, getting hemodialysis again today, continue high dose zosyn while awaiting cultures, cultures negative thus far, s/p bronch and will follow those cultures and adjust antibiotics accordingly   10/10: Afebrile, Intubated, sedated. Noted with Hgb of 5.9 this am. Improved tracheal secretions per RN. On minimal vent settings, possible extubation trial today. HD tomorrow per renal. No growth on blood cultures. Bronch culture with Pseudomonas aeruginosa, pending susceptibilities.  10/11: extubated but wheezing, mucous poor blood gas, aggressive PT, awaiting pseudomonas susceptibilities      Impression:  1. Multifocal pneumonia  2. Acute respiratory failure due to hypoxia    3. ESRD on HD  4. IDDM2       Plan:  -Continue Zosyn 4.5g IV q12hrs (day#7). extend treatment until respiratory status improve and not mucous plugging  -Follow bronchoscopy culture for susceptibility report   -Wean oxygen and aggressive pulmonary toilet  -Good glycemic control and avoid hypoglycemia  -Continue hemodialysis as per renal     Discussed with ICU    Dr. Chirinos is covering the infectious disease service this weekend. For all ID related issues, please reach out to her via [employer] Teams or our office at [phone].     Nelson Magallon, DO  Chief, Infectious Disease at [facility]  Reachable via [employer] Teams or ID office: [phone]  Weekdays: After 5pm, please call [phone] for all inquiries and new consults  Weekends: Message on-call infectious disease physician via teams (tawny Poon)

## 2024-10-11 NOTE — PROGRESS NOTE ADULT - SUBJECTIVE AND OBJECTIVE BOX
[facility] NEPHROLOGY SERVICES, [facility]  NEPHROLOGY AND HYPERTENSION  [address]  [address]  [place] [address]  [phone]    MD RONNA ARELLANO MD YELENA ROSENBERG, MD BINNY KOSHY, MD CHRISTOPHER CAPUTO, MD EDWARD BOVER, MD          Patient events noted  Increasing hypoxia during HD  Hypertensive urgency     MEDICATIONS  (STANDING):  albuterol/ipratropium for Nebulization 3 milliLiter(s) Nebulizer every 6 hours  amLODIPine   Tablet 10 milliGRAM(s) Oral daily  aspirin  chewable 81 milliGRAM(s) Enteral Tube daily  atorvastatin 20 milliGRAM(s) Oral at bedtime  chlorhexidine 2% Cloths 1 Application(s) Topical <User Schedule>  diltiazem    Tablet 30 milliGRAM(s) Oral every 8 hours  ferrous    sulfate Liquid 300 milliGRAM(s) Enteral Tube daily  heparin   Injectable 5000 Unit(s) SubCutaneous every 8 hours  hydrALAZINE 50 milliGRAM(s) Oral every 8 hours  insulin glargine Injectable (LANTUS) 7 Unit(s) SubCutaneous at bedtime  insulin lispro (ADMELOG) corrective regimen sliding scale   SubCutaneous every 6 hours  Nephro-noam 1 Tablet(s) Oral daily  nitroglycerin  Infusion 50 MICROgram(s)/Min (15 mL/Hr) IV Continuous <Continuous>  pantoprazole   Suspension 40 milliGRAM(s) Enteral Tube daily  piperacillin/tazobactam IVPB.. 4.5 Gram(s) IV Intermittent every 12 hours  polyethylene glycol 3350 17 Gram(s) Oral daily  sodium chloride 3%  Inhalation 4 milliLiter(s) Inhalation every 6 hours  sodium chloride 3%  Inhalation 4 milliLiter(s) Inhalation every 6 hours    MEDICATIONS  (PRN):  hydrALAZINE Injectable 10 milliGRAM(s) IV Push every 4 hours PRN SBP > 180  sodium chloride 0.9% lock flush 10 milliLiter(s) IV Push every 1 hour PRN Pre/post blood products, medications, blood draw, and to maintain line patency      10-10-24 @ 07:01  -  10-11-24 @ 07:00  --------------------------------------------------------  IN: 1527.4 mL / OUT: 0 mL / NET: 1527.4 mL    10-11-24 @ 07:01  -  10-11-24 @ 17:15  --------------------------------------------------------  IN: 214 mL / OUT: 1000 mL / NET: -786 mL      PHYSICAL EXAM:      T(C): 39 (10-11-24 @ 16:00), Max: 39.1 (10-11-24 @ 15:30)  HR: 110 (10-11-24 @ 16:00) (83 - 128)  BP: 184/73 (10-11-24 @ 16:00) (144/98 - 267/236)  RR: 28 (10-11-24 @ 16:00) (16 - 33)  SpO2: 100% (10-11-24 @ 16:00) (66% - 100%)  Wt(kg): --  Lungs decreased BS R left lung clear   Heart S1S2  Abd soft NT ND  Extremities:   tr edema                                    7.5    14.33 )-----------( 226      ( 11 Oct 2024 03:40 )             22.6     10-11    136  |  100  |  60[H]  ----------------------------<  336[H]  3.9   |  25  |  3.69[H]    Ca    8.9      11 Oct 2024 03:40  Phos  3.6     10-11  Mg     2.5     10-11    TPro  6.7  /  Alb  2.3[L]  /  TBili  0.5  /  DBili  x   /  AST  32  /  ALT  31  /  AlkPhos  153[H]  10-11    ABG - ( 11 Oct 2024 13:51 )  pH, Arterial: 7.53  pH, Blood: x     /  pCO2: 34    /  pO2: 45    / HCO3: 28    / Base Excess: 5.5   /  SaO2: 79.4              LIVER FUNCTIONS - ( 11 Oct 2024 03:40 )  Alb: 2.3 g/dL / Pro: 6.7 gm/dL / ALK PHOS: 153 U/L / ALT: 31 U/L / AST: 32 U/L / GGT: x           Creatinine Trend: 3.69<--, 2.32<--, 3.63<--, 3.28<--, 5.83<--, 5.06<--      Assessment   ESRD, PNA, Right pleural effusion, worsening hypoxia and right lung atelectasis   Accelerated HTN    Plan:  HD terminated at 2 hours  Nitroglycerin drip  Will arrange for Hd tomorrow   PCCM follow up   MD Delonte Arellano MD

## 2024-10-12 LAB
-  LEVOFLOXACIN: SIGNIFICANT CHANGE UP
-  TRIMETHOPRIM/SULFAMETHOXAZOLE: SIGNIFICANT CHANGE UP
ALBUMIN SERPL ELPH-MCNC: 2.3 G/DL — LOW (ref 3.3–5)
ALP SERPL-CCNC: 132 U/L — HIGH (ref 40–120)
ALT FLD-CCNC: 28 U/L — SIGNIFICANT CHANGE UP (ref 12–78)
ANION GAP SERPL CALC-SCNC: 10 MMOL/L — SIGNIFICANT CHANGE UP (ref 5–17)
APTT BLD: 37.5 SEC — HIGH (ref 24.5–35.6)
AST SERPL-CCNC: 31 U/L — SIGNIFICANT CHANGE UP (ref 15–37)
BASOPHILS # BLD AUTO: 0.08 K/UL — SIGNIFICANT CHANGE UP (ref 0–0.2)
BASOPHILS # BLD AUTO: 0.1 K/UL — SIGNIFICANT CHANGE UP (ref 0–0.2)
BASOPHILS NFR BLD AUTO: 0.3 % — SIGNIFICANT CHANGE UP (ref 0–2)
BASOPHILS NFR BLD AUTO: 0.4 % — SIGNIFICANT CHANGE UP (ref 0–2)
BILIRUB SERPL-MCNC: 0.5 MG/DL — SIGNIFICANT CHANGE UP (ref 0.2–1.2)
BLD GP AB SCN SERPL QL: SIGNIFICANT CHANGE UP
BUN SERPL-MCNC: 46 MG/DL — HIGH (ref 7–23)
CALCIUM SERPL-MCNC: 9.1 MG/DL — SIGNIFICANT CHANGE UP (ref 8.5–10.1)
CHLORIDE SERPL-SCNC: 100 MMOL/L — SIGNIFICANT CHANGE UP (ref 96–108)
CO2 SERPL-SCNC: 26 MMOL/L — SIGNIFICANT CHANGE UP (ref 22–31)
CREAT SERPL-MCNC: 3.12 MG/DL — HIGH (ref 0.5–1.3)
EGFR: 21 ML/MIN/1.73M2 — LOW
EOSINOPHIL # BLD AUTO: 0.01 K/UL — SIGNIFICANT CHANGE UP (ref 0–0.5)
EOSINOPHIL # BLD AUTO: 0.03 K/UL — SIGNIFICANT CHANGE UP (ref 0–0.5)
EOSINOPHIL NFR BLD AUTO: 0 % — SIGNIFICANT CHANGE UP (ref 0–6)
EOSINOPHIL NFR BLD AUTO: 0.1 % — SIGNIFICANT CHANGE UP (ref 0–6)
GLUCOSE BLDC GLUCOMTR-MCNC: 102 MG/DL — HIGH (ref 70–99)
GLUCOSE BLDC GLUCOMTR-MCNC: 129 MG/DL — HIGH (ref 70–99)
GLUCOSE BLDC GLUCOMTR-MCNC: 85 MG/DL — SIGNIFICANT CHANGE UP (ref 70–99)
GLUCOSE BLDC GLUCOMTR-MCNC: 86 MG/DL — SIGNIFICANT CHANGE UP (ref 70–99)
GLUCOSE SERPL-MCNC: 143 MG/DL — HIGH (ref 70–99)
HCT VFR BLD CALC: 27.1 % — LOW (ref 39–50)
HCT VFR BLD CALC: 28 % — LOW (ref 39–50)
HGB BLD-MCNC: 9 G/DL — LOW (ref 13–17)
HGB BLD-MCNC: 9.3 G/DL — LOW (ref 13–17)
IMM GRANULOCYTES NFR BLD AUTO: 1.3 % — HIGH (ref 0–0.9)
IMM GRANULOCYTES NFR BLD AUTO: 1.4 % — HIGH (ref 0–0.9)
INR BLD: 1.21 RATIO — HIGH (ref 0.85–1.16)
LYMPHOCYTES # BLD AUTO: 0.99 K/UL — LOW (ref 1–3.3)
LYMPHOCYTES # BLD AUTO: 1.01 K/UL — SIGNIFICANT CHANGE UP (ref 1–3.3)
LYMPHOCYTES # BLD AUTO: 3.6 % — LOW (ref 13–44)
LYMPHOCYTES # BLD AUTO: 3.9 % — LOW (ref 13–44)
MAGNESIUM SERPL-MCNC: 2.1 MG/DL — SIGNIFICANT CHANGE UP (ref 1.6–2.6)
MCHC RBC-ENTMCNC: 23.3 PG — LOW (ref 27–34)
MCHC RBC-ENTMCNC: 23.4 PG — LOW (ref 27–34)
MCHC RBC-ENTMCNC: 33.2 G/DL — SIGNIFICANT CHANGE UP (ref 32–36)
MCHC RBC-ENTMCNC: 33.2 G/DL — SIGNIFICANT CHANGE UP (ref 32–36)
MCV RBC AUTO: 70.2 FL — LOW (ref 80–100)
MCV RBC AUTO: 70.4 FL — LOW (ref 80–100)
METHOD TYPE: SIGNIFICANT CHANGE UP
MONOCYTES # BLD AUTO: 0.69 K/UL — SIGNIFICANT CHANGE UP (ref 0–0.9)
MONOCYTES # BLD AUTO: 0.86 K/UL — SIGNIFICANT CHANGE UP (ref 0–0.9)
MONOCYTES NFR BLD AUTO: 2.5 % — SIGNIFICANT CHANGE UP (ref 2–14)
MONOCYTES NFR BLD AUTO: 3.3 % — SIGNIFICANT CHANGE UP (ref 2–14)
NEUTROPHILS # BLD AUTO: 23.59 K/UL — HIGH (ref 1.8–7.4)
NEUTROPHILS # BLD AUTO: 25.06 K/UL — HIGH (ref 1.8–7.4)
NEUTROPHILS NFR BLD AUTO: 90.9 % — HIGH (ref 43–77)
NEUTROPHILS NFR BLD AUTO: 92.3 % — HIGH (ref 43–77)
NRBC # BLD: 0 /100 WBCS — SIGNIFICANT CHANGE UP (ref 0–0)
NRBC # BLD: 0 /100 WBCS — SIGNIFICANT CHANGE UP (ref 0–0)
PHOSPHATE SERPL-MCNC: 2 MG/DL — LOW (ref 2.5–4.5)
PLATELET # BLD AUTO: 214 K/UL — SIGNIFICANT CHANGE UP (ref 150–400)
PLATELET # BLD AUTO: 229 K/UL — SIGNIFICANT CHANGE UP (ref 150–400)
POTASSIUM SERPL-MCNC: 3.4 MMOL/L — LOW (ref 3.5–5.3)
POTASSIUM SERPL-SCNC: 3.4 MMOL/L — LOW (ref 3.5–5.3)
PROT SERPL-MCNC: 6.9 GM/DL — SIGNIFICANT CHANGE UP (ref 6–8.3)
PROTHROM AB SERPL-ACNC: 13.7 SEC — HIGH (ref 9.9–13.4)
RBC # BLD: 3.86 M/UL — LOW (ref 4.2–5.8)
RBC # BLD: 3.98 M/UL — LOW (ref 4.2–5.8)
RBC # FLD: 20.6 % — HIGH (ref 10.3–14.5)
RBC # FLD: 20.6 % — HIGH (ref 10.3–14.5)
SODIUM SERPL-SCNC: 136 MMOL/L — SIGNIFICANT CHANGE UP (ref 135–145)
WBC # BLD: 25.95 K/UL — HIGH (ref 3.8–10.5)
WBC # BLD: 27.18 K/UL — HIGH (ref 3.8–10.5)
WBC # FLD AUTO: 25.95 K/UL — HIGH (ref 3.8–10.5)
WBC # FLD AUTO: 27.18 K/UL — HIGH (ref 3.8–10.5)

## 2024-10-12 PROCEDURE — 99291 CRITICAL CARE FIRST HOUR: CPT

## 2024-10-12 RX ORDER — TRANEXAMIC ACID 650 MG/1
5 TABLET ORAL ONCE
Refills: 0 | Status: COMPLETED | OUTPATIENT
Start: 2024-10-12 | End: 2024-10-12

## 2024-10-12 RX ORDER — INSULIN GLARGINE,HUM.REC.ANLOG 100/ML
4 VIAL (ML) SUBCUTANEOUS AT BEDTIME
Refills: 0 | Status: DISCONTINUED | OUTPATIENT
Start: 2024-10-12 | End: 2024-10-14

## 2024-10-12 RX ORDER — DILTIAZEM HCL 5 MG/ML
60 VIAL (ML) INTRAVENOUS THREE TIMES A DAY
Refills: 0 | Status: DISCONTINUED | OUTPATIENT
Start: 2024-10-12 | End: 2024-10-16

## 2024-10-12 RX ORDER — HYDRALAZINE HYDROCHLORIDE 50 MG/1
20 TABLET, FILM COATED ORAL ONCE
Refills: 0 | Status: COMPLETED | OUTPATIENT
Start: 2024-10-12 | End: 2024-10-12

## 2024-10-12 RX ORDER — DIBASIC SODIUM PHOSPHATE, MONOBASIC POTASSIUM PHOSPHATE AND MONOBASIC SODIUM PHOSPHATE 852; 155; 130 MG/1; MG/1; MG/1
1 TABLET ORAL ONCE
Refills: 0 | Status: COMPLETED | OUTPATIENT
Start: 2024-10-12 | End: 2024-10-12

## 2024-10-12 RX ORDER — ACETAMINOPHEN 500 MG
675 TABLET ORAL ONCE
Refills: 0 | Status: COMPLETED | OUTPATIENT
Start: 2024-10-12 | End: 2024-10-12

## 2024-10-12 RX ORDER — POTASSIUM CHLORIDE 10 MEQ
20 TABLET, EXTENDED RELEASE ORAL ONCE
Refills: 0 | Status: COMPLETED | OUTPATIENT
Start: 2024-10-12 | End: 2024-10-12

## 2024-10-12 RX ORDER — SULFAMETHOXAZOLE/TRIMETHOPRIM 800-160 MG
1 TABLET ORAL EVERY 12 HOURS
Refills: 0 | Status: DISCONTINUED | OUTPATIENT
Start: 2024-10-12 | End: 2024-10-16

## 2024-10-12 RX ADMIN — IPRATROPIUM BROMIDE AND ALBUTEROL SULFATE 3 MILLILITER(S): .5; 2.5 SOLUTION RESPIRATORY (INHALATION) at 05:36

## 2024-10-12 RX ADMIN — Medication 1 TABLET(S): at 11:36

## 2024-10-12 RX ADMIN — Medication 60 MILLIGRAM(S): at 14:03

## 2024-10-12 RX ADMIN — HYDRALAZINE HYDROCHLORIDE 10 MILLIGRAM(S): 50 TABLET, FILM COATED ORAL at 04:26

## 2024-10-12 RX ADMIN — HEPARIN SODIUM 5000 UNIT(S): 10000 INJECTION INTRAVENOUS; SUBCUTANEOUS at 05:44

## 2024-10-12 RX ADMIN — Medication 4 UNIT(S): at 22:06

## 2024-10-12 RX ADMIN — IPRATROPIUM BROMIDE AND ALBUTEROL SULFATE 3 MILLILITER(S): .5; 2.5 SOLUTION RESPIRATORY (INHALATION) at 11:19

## 2024-10-12 RX ADMIN — CHLORHEXIDINE GLUCONATE 1 APPLICATION(S): 40 SOLUTION TOPICAL at 05:45

## 2024-10-12 RX ADMIN — IPRATROPIUM BROMIDE AND ALBUTEROL SULFATE 3 MILLILITER(S): .5; 2.5 SOLUTION RESPIRATORY (INHALATION) at 17:43

## 2024-10-12 RX ADMIN — HYDRALAZINE HYDROCHLORIDE 50 MILLIGRAM(S): 50 TABLET, FILM COATED ORAL at 22:37

## 2024-10-12 RX ADMIN — HYDRALAZINE HYDROCHLORIDE 20 MILLIGRAM(S): 50 TABLET, FILM COATED ORAL at 09:38

## 2024-10-12 RX ADMIN — DIBASIC SODIUM PHOSPHATE, MONOBASIC POTASSIUM PHOSPHATE AND MONOBASIC SODIUM PHOSPHATE 1 PACKET(S): 852; 155; 130 TABLET ORAL at 05:44

## 2024-10-12 RX ADMIN — SODIUM CHLORIDE 4 MILLILITER(S): 9 INJECTION, SOLUTION INTRAMUSCULAR; INTRAVENOUS; SUBCUTANEOUS at 11:19

## 2024-10-12 RX ADMIN — TRANEXAMIC ACID 5 MILLILITER(S): 650 TABLET ORAL at 12:48

## 2024-10-12 RX ADMIN — Medication 300 MILLIGRAM(S): at 11:36

## 2024-10-12 RX ADMIN — Medication 2: at 00:11

## 2024-10-12 RX ADMIN — Medication 60 MILLIGRAM(S): at 22:06

## 2024-10-12 RX ADMIN — Medication 675 MILLIGRAM(S): at 11:03

## 2024-10-12 RX ADMIN — PIPERACILLIN AND TAZOBACTAM 25 GRAM(S): .5; 4 INJECTION, POWDER, LYOPHILIZED, FOR SOLUTION INTRAVENOUS at 09:39

## 2024-10-12 RX ADMIN — SODIUM CHLORIDE 4 MILLILITER(S): 9 INJECTION, SOLUTION INTRAMUSCULAR; INTRAVENOUS; SUBCUTANEOUS at 17:43

## 2024-10-12 RX ADMIN — Medication 270 MILLIGRAM(S): at 09:57

## 2024-10-12 RX ADMIN — SODIUM CHLORIDE 4 MILLILITER(S): 9 INJECTION, SOLUTION INTRAMUSCULAR; INTRAVENOUS; SUBCUTANEOUS at 05:36

## 2024-10-12 RX ADMIN — Medication 30 MILLIGRAM(S): at 05:45

## 2024-10-12 RX ADMIN — Medication 20 MILLIGRAM(S): at 22:37

## 2024-10-12 RX ADMIN — PANTOPRAZOLE SODIUM 40 MILLIGRAM(S): 40 TABLET, DELAYED RELEASE ORAL at 11:36

## 2024-10-12 RX ADMIN — Medication 10 MILLIGRAM(S): at 05:44

## 2024-10-12 RX ADMIN — HYDRALAZINE HYDROCHLORIDE 10 MILLIGRAM(S): 50 TABLET, FILM COATED ORAL at 00:25

## 2024-10-12 RX ADMIN — HYDRALAZINE HYDROCHLORIDE 50 MILLIGRAM(S): 50 TABLET, FILM COATED ORAL at 05:44

## 2024-10-12 RX ADMIN — HYDRALAZINE HYDROCHLORIDE 50 MILLIGRAM(S): 50 TABLET, FILM COATED ORAL at 14:03

## 2024-10-12 RX ADMIN — Medication 20 MILLIEQUIVALENT(S): at 05:44

## 2024-10-12 NOTE — PROGRESS NOTE ADULT - ASSESSMENT
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    Neuro: awake alert.  CV: Septic SHock. Resolved. Now hypertensive. off nitro gtt. Adjust PO regimen to cotnrol BP. IV break-through prn.  Respiratory: Acute hypoxic respiratory failure requiring intubation. with RLL pneumonia. now extubated. worsening 2/2 inability to clear mucous/secretions. on HFNC. Now with epistaxis from suction trauma.  GI: Continue feeds via PEG.  REnal: Hx ESRD on HD. HD as scheduled.  Endocrine: Hx DM. ISS and FS q6hrs. Lantus.  Heme: Check stat coags. hold heparin SQ. SCD's for dvt ppx. s/p 1 unit prbc yesterday.  ID: RLL PNA. with pseudomonas. On zosyn  Dispo: Unclear trajectory. High risk for reintubation.

## 2024-10-12 NOTE — PROGRESS NOTE ADULT - SUBJECTIVE AND OBJECTIVE BOX
[facility] NEPHROLOGY SERVICES, [facility]  NEPHROLOGY AND HYPERTENSION  [address]  [address]  [place] [address]  [phone]    MD RONNA CHANG MD YELENA ROSENBERG, MD BINNY KOSHY, MD CHRISTOPHER CAPUTO, MD EDWARD BOVER, MD          Patient events noted    MEDICATIONS  (STANDING):  albuterol/ipratropium for Nebulization 3 milliLiter(s) Nebulizer every 6 hours  amLODIPine   Tablet 10 milliGRAM(s) Oral daily  atorvastatin 20 milliGRAM(s) Oral at bedtime  chlorhexidine 2% Cloths 1 Application(s) Topical <User Schedule>  diltiazem    Tablet 60 milliGRAM(s) Oral three times a day  ferrous    sulfate Liquid 300 milliGRAM(s) Enteral Tube daily  hydrALAZINE 50 milliGRAM(s) Oral every 8 hours  insulin glargine Injectable (LANTUS) 4 Unit(s) SubCutaneous at bedtime  insulin lispro (ADMELOG) corrective regimen sliding scale   SubCutaneous every 6 hours  Nephro-noam 1 Tablet(s) Oral daily  pantoprazole   Suspension 40 milliGRAM(s) Enteral Tube daily  piperacillin/tazobactam IVPB.. 4.5 Gram(s) IV Intermittent every 12 hours  polyethylene glycol 3350 17 Gram(s) Oral daily  sodium chloride 3%  Inhalation 4 milliLiter(s) Inhalation every 6 hours  sodium chloride 3%  Inhalation 4 milliLiter(s) Inhalation every 6 hours  trimethoprim  160 mG/sulfamethoxazole 800 mG 1 Tablet(s) Oral every 12 hours    MEDICATIONS  (PRN):  hydrALAZINE Injectable 10 milliGRAM(s) IV Push every 4 hours PRN SBP > 180  sodium chloride 0.9% lock flush 10 milliLiter(s) IV Push every 1 hour PRN Pre/post blood products, medications, blood draw, and to maintain line patency      10-11-24 @ 07:01  -  10-12-24 @ 07:00  --------------------------------------------------------  IN: 724 mL / OUT: 1000 mL / NET: -276 mL    10-12-24 @ 07:01  -  10-12-24 @ 18:49  --------------------------------------------------------  IN: 160 mL / OUT: 2500 mL / NET: -2340 mL      PHYSICAL EXAM:      T(C): 37 (10-12-24 @ 18:00), Max: 38.1 (10-12-24 @ 02:30)  HR: 80 (10-12-24 @ 18:00) (80 - 114)  BP: 157/67 (10-12-24 @ 12:00) (157/67 - 190/60)  RR: 19 (10-12-24 @ 18:00) (17 - 35)  SpO2: 100% (10-12-24 @ 18:00) (85% - 100%)  Wt(kg): --  Lungs decreased BS Right   Heart S1S2  Abd soft NT ND  Extremities:   tr edema                                    9.0    27.18 )-----------( 214      ( 12 Oct 2024 10:34 )             27.1     10-12    136  |  100  |  46[H]  ----------------------------<  143[H]  3.4[L]   |  26  |  3.12[H]    Ca    9.1      12 Oct 2024 02:30  Phos  2.0     10-12  Mg     2.1     10-12    TPro  6.9  /  Alb  2.3[L]  /  TBili  0.5  /  DBili  x   /  AST  31  /  ALT  28  /  AlkPhos  132[H]  10-12    ABG - ( 11 Oct 2024 13:51 )  pH, Arterial: 7.53  pH, Blood: x     /  pCO2: 34    /  pO2: 45    / HCO3: 28    / Base Excess: 5.5   /  SaO2: 79.4              LIVER FUNCTIONS - ( 12 Oct 2024 02:30 )  Alb: 2.3 g/dL / Pro: 6.9 gm/dL / ALK PHOS: 132 U/L / ALT: 28 U/L / AST: 31 U/L / GGT: x           Creatinine Trend: 3.12<--, 3.69<--, 2.32<--, 3.63<--, 3.28<--, 5.83<--    Assessment   ESRD, PNA, Right pleural effusion, worsening hypoxia and right lung atelectasis   Accelerated HTN improved     Plan:  HD for today  UF 2.5 Kg  PCCM follow up       Delonte Moya MD

## 2024-10-12 NOTE — PROGRESS NOTE ADULT - SUBJECTIVE AND OBJECTIVE BOX
INTERVAL HPI/OVERNIGHT EVENTS:   HPI:  Megha Art is a 73 year old male with PMHx of HTN, IDDM2, ESRD on HD (M/W/F, through RUE AV fistula), hx of CVA x 2 (with residual R. sided weakness and dysphagia s/p PEG tube, previously with tracheostomy and now removed), and hx of RLE DVT (completed apixaban course) who presented to the ED on 10/4/24 for complaints of cough and chills.    History obtained from wife at bedside. As per wife, patient typically coughs at baseline since he previously had a tracheostomy. However, for the past 2-3 days, he has been coughing more frequently and it has been productive with yellow phlegm. Wife was assisting him with getting dressed this afternoon around 1 PM when he felt nauseous and started complaining of abdominal pain. Then had a bowel movement. When wife was cleaning him up, patient was shaking "like he had chills" and she thinks his eyes rolled back for a few seconds. No loss of consciousness. EMS was called and another episode of shaking upon EMS arrival. Of note, patient is due for dialysis today but did not receive it since he came to the ER today. Baseline functional status is wheelchair bound and dependent with all ADLs. NPO with Nepro q4. Lives at home with wife.     In the ED, Tmax 101.3, HR as elevated as 118, BP as elevated as 201/68, and SpO2 as low as 91% on room air. Initially placed on 15L NRB with improvement of SpO2 to 95%. Now on room air. WBC 17.38K, hgb 9.9, sodium 129, BUN 61, Cr 4.05, alkphos 169. Lactic acid 3.6. CT head without evidence of acute hemorrhage mass or mass effect but with encephalomalacia and gliosis again seen involving the bifrontal region. CT CAP with pneumonia and rectum distended with stool. Received  cc bolus, vancomycin 1 g IV, zosyn 3.375 g IV, acetaminophen 1 g IV, hydralazine 10 mg IV, pantoprazole 40 mg IV, and ondansetron 4 mg IV. Evaluated by nephrology who is planning for HD tomorrow. (04 Oct 2024 22:03)      CENTRAL LINE: [ ] YES [ ] NO  LOCATION:   DATE INSERTED:  REMOVE: [ ] YES [ ] NO  EXPLAIN:    [facility]GILL: [ ] YES [ ] NO    DATE INSERTED:  REMOVE:  [ ] YES [ ] NO  EXPLAIN:    A-LINE:  [ ] YES [ ] NO  LOCATION:   DATE INSERTED:  REMOVE:  [ ] YES [ ] NO  EXPLAIN:    PAST MEDICAL & SURGICAL HISTORY:  ESRD on hemodialysis      HTN (hypertension)      Insulin dependent type 2 diabetes mellitus      History of cerebrovascular accident (CVA) with residual deficit      History of DVT of lower extremity      Arteriovenous fistula      S/P percutaneous endoscopic gastrostomy (PEG) tube placement      History of tracheostomy          REVIEW OF SYSTEMS:    Negative ROS aside from HPI/Interval events above.    ICU Vital Signs Last 24 Hrs  T(C): 38 (12 Oct 2024 10:00), Max: 39.1 (11 Oct 2024 15:30)  T(F): 100.4 (12 Oct 2024 10:00), Max: 102.4 (11 Oct 2024 15:30)  HR: 105 (12 Oct 2024 10:00) (84 - 128)  BP: 174/61 (12 Oct 2024 07:00) (174/61 - 267/236)  BP(mean): 91 (12 Oct 2024 07:00) (91 - 247)  ABP: 146/47 (12 Oct 2024 10:00) (145/46 - 225/83)  ABP(mean): 81 (12 Oct 2024 10:00) (81 - 140)  RR: 31 (12 Oct 2024 10:00) (15 - 35)  SpO2: 100% (12 Oct 2024 10:00) (66% - 100%)    O2 Parameters below as of 12 Oct 2024 09:16  Patient On (Oxygen Delivery Method): nasal cannula, high flow, humidifier temperature 37 degree Celceus  O2 Flow (L/min): 60  O2 Concentration (%): 100        ABG - ( 11 Oct 2024 13:51 )  pH, Arterial: 7.53  pH, Blood: x     /  pCO2: 34    /  pO2: 45    / HCO3: 28    / Base Excess: 5.5   /  SaO2: 79.4                I&O's Detail    11 Oct 2024 07:01  -  12 Oct 2024 07:00  --------------------------------------------------------  IN:    Enteral Tube Flush: 80 mL    IV PiggyBack: 200 mL    Nepro with Carb Steady: 330 mL    Nitroglycerin: 114 mL  Total IN: 724 mL    OUT:    Other (mL): 1000 mL  Total OUT: 1000 mL    Total NET: -276 mL      12 Oct 2024 07:01  -  12 Oct 2024 11:06  --------------------------------------------------------  IN:    IV PiggyBack: 150 mL  Total IN: 150 mL    OUT:  Total OUT: 0 mL    Total NET: 150 mL            CAPILLARY BLOOD GLUCOSE      POCT Blood Glucose.: 86 mg/dL (12 Oct 2024 05:36)  POCT Blood Glucose.: 200 mg/dL (11 Oct 2024 23:43)  POCT Blood Glucose.: 167 mg/dL (11 Oct 2024 21:31)  POCT Blood Glucose.: 72 mg/dL (11 Oct 2024 17:56)  POCT Blood Glucose.: 221 mg/dL (11 Oct 2024 11:34)        PHYSICAL EXAM:    General: NAD comfortable    Neck: No JVD  Respiratory: Coarse b/l. on HFNC. Epistaxis from suctioning. Sats fluctuate based on patient ability to clear mucous/secretions.  Cardiovascular: S1 S2 RRR  Abdomen: Sof NT ND  Extremities: No edema or rash, R AVF  Skin: No wounds  Neurological: awakens. alert. calm. cooperative. Answers.      LABS:                        9.0    27.18 )-----------( 214      ( 12 Oct 2024 10:34 )             27.1      10-12    136  |  100  |  46[H]  ----------------------------<  143[H]  3.4[L]   |  26  |  3.12[H]    Ca    9.1      12 Oct 2024 02:30  Phos  2.0     10-12  Mg     2.1     10-12    TPro  6.9  /  Alb  2.3[L]  /  TBili  0.5  /  DBili  x   /  AST  31  /  ALT  28  /  AlkPhos  132[H]  10-12    PT/INR - ( 12 Oct 2024 10:34 )   PT: 13.7 sec;   INR: 1.21 ratio         PTT - ( 12 Oct 2024 10:34 )  PTT:37.5 sec  Urinalysis Basic - ( 12 Oct 2024 02:30 )    Color: x / Appearance: x / SG: x / pH: x  Gluc: 143 mg/dL / Ketone: x  / Bili: x / Urobili: x   Blood: x / Protein: x / Nitrite: x   Leuk Esterase: x / RBC: x / WBC x   Sq Epi: x / Non Sq Epi: x / Bacteria: x

## 2024-10-13 LAB
-  MINOCYCLINE: SIGNIFICANT CHANGE UP
ALBUMIN SERPL ELPH-MCNC: 2.3 G/DL — LOW (ref 3.3–5)
ALP SERPL-CCNC: 131 U/L — HIGH (ref 40–120)
ALT FLD-CCNC: 23 U/L — SIGNIFICANT CHANGE UP (ref 12–78)
ANION GAP SERPL CALC-SCNC: 14 MMOL/L — SIGNIFICANT CHANGE UP (ref 5–17)
AST SERPL-CCNC: 23 U/L — SIGNIFICANT CHANGE UP (ref 15–37)
BASE EXCESS BLDA CALC-SCNC: -0.7 MMOL/L — SIGNIFICANT CHANGE UP (ref -2–3)
BASOPHILS # BLD AUTO: 0.09 K/UL — SIGNIFICANT CHANGE UP (ref 0–0.2)
BASOPHILS NFR BLD AUTO: 0.3 % — SIGNIFICANT CHANGE UP (ref 0–2)
BILIRUB SERPL-MCNC: 0.7 MG/DL — SIGNIFICANT CHANGE UP (ref 0.2–1.2)
BLOOD GAS COMMENTS ARTERIAL: SIGNIFICANT CHANGE UP
BUN SERPL-MCNC: 36 MG/DL — HIGH (ref 7–23)
CALCIUM SERPL-MCNC: 9.5 MG/DL — SIGNIFICANT CHANGE UP (ref 8.5–10.1)
CHLORIDE SERPL-SCNC: 99 MMOL/L — SIGNIFICANT CHANGE UP (ref 96–108)
CO2 BLDA-SCNC: 25 MMOL/L — HIGH (ref 19–24)
CO2 SERPL-SCNC: 23 MMOL/L — SIGNIFICANT CHANGE UP (ref 22–31)
CREAT SERPL-MCNC: 2.44 MG/DL — HIGH (ref 0.5–1.3)
CULTURE RESULTS: ABNORMAL
CULTURE RESULTS: SIGNIFICANT CHANGE UP
CULTURE RESULTS: SIGNIFICANT CHANGE UP
EGFR: 28 ML/MIN/1.73M2 — LOW
EOSINOPHIL # BLD AUTO: 0 K/UL — SIGNIFICANT CHANGE UP (ref 0–0.5)
EOSINOPHIL NFR BLD AUTO: 0 % — SIGNIFICANT CHANGE UP (ref 0–6)
GAS PNL BLDA: SIGNIFICANT CHANGE UP
GLUCOSE BLDC GLUCOMTR-MCNC: 160 MG/DL — HIGH (ref 70–99)
GLUCOSE BLDC GLUCOMTR-MCNC: 179 MG/DL — HIGH (ref 70–99)
GLUCOSE BLDC GLUCOMTR-MCNC: 198 MG/DL — HIGH (ref 70–99)
GLUCOSE BLDC GLUCOMTR-MCNC: 212 MG/DL — HIGH (ref 70–99)
GLUCOSE BLDC GLUCOMTR-MCNC: 214 MG/DL — HIGH (ref 70–99)
GLUCOSE BLDC GLUCOMTR-MCNC: 252 MG/DL — HIGH (ref 70–99)
GLUCOSE SERPL-MCNC: 175 MG/DL — HIGH (ref 70–99)
GRAM STN FLD: ABNORMAL
HCO3 BLDA-SCNC: 24 MMOL/L — SIGNIFICANT CHANGE UP (ref 21–28)
HCT VFR BLD CALC: 27.4 % — LOW (ref 39–50)
HGB BLD-MCNC: 9 G/DL — LOW (ref 13–17)
HOROWITZ INDEX BLDA+IHG-RTO: 90 — SIGNIFICANT CHANGE UP
IMM GRANULOCYTES NFR BLD AUTO: 1.3 % — HIGH (ref 0–0.9)
LYMPHOCYTES # BLD AUTO: 1.1 K/UL — SIGNIFICANT CHANGE UP (ref 1–3.3)
LYMPHOCYTES # BLD AUTO: 3.2 % — LOW (ref 13–44)
MAGNESIUM SERPL-MCNC: 2.2 MG/DL — SIGNIFICANT CHANGE UP (ref 1.6–2.6)
MCHC RBC-ENTMCNC: 24.3 PG — LOW (ref 27–34)
MCHC RBC-ENTMCNC: 32.8 G/DL — SIGNIFICANT CHANGE UP (ref 32–36)
MCV RBC AUTO: 73.9 FL — LOW (ref 80–100)
METHOD TYPE: SIGNIFICANT CHANGE UP
MONOCYTES # BLD AUTO: 0.87 K/UL — SIGNIFICANT CHANGE UP (ref 0–0.9)
MONOCYTES NFR BLD AUTO: 2.5 % — SIGNIFICANT CHANGE UP (ref 2–14)
NEUTROPHILS # BLD AUTO: 31.67 K/UL — HIGH (ref 1.8–7.4)
NEUTROPHILS NFR BLD AUTO: 92.7 % — HIGH (ref 43–77)
NRBC # BLD: 0 /100 WBCS — SIGNIFICANT CHANGE UP (ref 0–0)
ORGANISM # SPEC MICROSCOPIC CNT: ABNORMAL
ORGANISM # SPEC MICROSCOPIC CNT: SIGNIFICANT CHANGE UP
PCO2 BLDA: 40 MMHG — SIGNIFICANT CHANGE UP (ref 32–46)
PH BLDA: 7.39 — SIGNIFICANT CHANGE UP (ref 7.35–7.45)
PHOSPHATE SERPL-MCNC: 3.3 MG/DL — SIGNIFICANT CHANGE UP (ref 2.5–4.5)
PLATELET # BLD AUTO: 281 K/UL — SIGNIFICANT CHANGE UP (ref 150–400)
PO2 BLDA: 77 MMHG — LOW (ref 83–108)
POTASSIUM SERPL-MCNC: 4.3 MMOL/L — SIGNIFICANT CHANGE UP (ref 3.5–5.3)
POTASSIUM SERPL-SCNC: 4.3 MMOL/L — SIGNIFICANT CHANGE UP (ref 3.5–5.3)
PROT SERPL-MCNC: 7.3 GM/DL — SIGNIFICANT CHANGE UP (ref 6–8.3)
RBC # BLD: 3.71 M/UL — LOW (ref 4.2–5.8)
RBC # FLD: 22.1 % — HIGH (ref 10.3–14.5)
SAO2 % BLDA: 97.5 % — SIGNIFICANT CHANGE UP (ref 94–98)
SODIUM SERPL-SCNC: 136 MMOL/L — SIGNIFICANT CHANGE UP (ref 135–145)
SPECIMEN SOURCE: SIGNIFICANT CHANGE UP
WBC # BLD: 34.18 K/UL — HIGH (ref 3.8–10.5)
WBC # FLD AUTO: 34.18 K/UL — HIGH (ref 3.8–10.5)

## 2024-10-13 PROCEDURE — 76937 US GUIDE VASCULAR ACCESS: CPT | Mod: 26,59

## 2024-10-13 PROCEDURE — 36000 PLACE NEEDLE IN VEIN: CPT | Mod: 59

## 2024-10-13 PROCEDURE — 71045 X-RAY EXAM CHEST 1 VIEW: CPT | Mod: 26

## 2024-10-13 PROCEDURE — 99291 CRITICAL CARE FIRST HOUR: CPT

## 2024-10-13 RX ORDER — HYDRALAZINE HYDROCHLORIDE 50 MG/1
100 TABLET, FILM COATED ORAL EVERY 8 HOURS
Refills: 0 | Status: DISCONTINUED | OUTPATIENT
Start: 2024-10-13 | End: 2024-10-15

## 2024-10-13 RX ORDER — SODIUM CHLORIDE 9 MG/ML
4 INJECTION, SOLUTION INTRAMUSCULAR; INTRAVENOUS; SUBCUTANEOUS ONCE
Refills: 0 | Status: COMPLETED | OUTPATIENT
Start: 2024-10-13 | End: 2024-10-13

## 2024-10-13 RX ORDER — IPRATROPIUM BROMIDE AND ALBUTEROL SULFATE .5; 2.5 MG/3ML; MG/3ML
3 SOLUTION RESPIRATORY (INHALATION) ONCE
Refills: 0 | Status: COMPLETED | OUTPATIENT
Start: 2024-10-13 | End: 2024-10-13

## 2024-10-13 RX ADMIN — HYDRALAZINE HYDROCHLORIDE 100 MILLIGRAM(S): 50 TABLET, FILM COATED ORAL at 13:00

## 2024-10-13 RX ADMIN — SODIUM CHLORIDE 4 MILLILITER(S): 9 INJECTION, SOLUTION INTRAMUSCULAR; INTRAVENOUS; SUBCUTANEOUS at 21:18

## 2024-10-13 RX ADMIN — IPRATROPIUM BROMIDE AND ALBUTEROL SULFATE 3 MILLILITER(S): .5; 2.5 SOLUTION RESPIRATORY (INHALATION) at 17:13

## 2024-10-13 RX ADMIN — IPRATROPIUM BROMIDE AND ALBUTEROL SULFATE 3 MILLILITER(S): .5; 2.5 SOLUTION RESPIRATORY (INHALATION) at 00:10

## 2024-10-13 RX ADMIN — Medication 6: at 23:13

## 2024-10-13 RX ADMIN — Medication 60 MILLIGRAM(S): at 21:42

## 2024-10-13 RX ADMIN — PANTOPRAZOLE SODIUM 40 MILLIGRAM(S): 40 TABLET, DELAYED RELEASE ORAL at 11:01

## 2024-10-13 RX ADMIN — Medication 20 MILLIGRAM(S): at 21:40

## 2024-10-13 RX ADMIN — Medication 2: at 01:01

## 2024-10-13 RX ADMIN — PIPERACILLIN AND TAZOBACTAM 25 GRAM(S): .5; 4 INJECTION, POWDER, LYOPHILIZED, FOR SOLUTION INTRAVENOUS at 00:23

## 2024-10-13 RX ADMIN — Medication 300 MILLIGRAM(S): at 11:01

## 2024-10-13 RX ADMIN — Medication 1 TABLET(S): at 11:01

## 2024-10-13 RX ADMIN — Medication 60 MILLIGRAM(S): at 06:03

## 2024-10-13 RX ADMIN — CHLORHEXIDINE GLUCONATE 1 APPLICATION(S): 40 SOLUTION TOPICAL at 06:03

## 2024-10-13 RX ADMIN — SODIUM CHLORIDE 4 MILLILITER(S): 9 INJECTION, SOLUTION INTRAMUSCULAR; INTRAVENOUS; SUBCUTANEOUS at 17:13

## 2024-10-13 RX ADMIN — Medication 1 TABLET(S): at 06:00

## 2024-10-13 RX ADMIN — PIPERACILLIN AND TAZOBACTAM 25 GRAM(S): .5; 4 INJECTION, POWDER, LYOPHILIZED, FOR SOLUTION INTRAVENOUS at 09:44

## 2024-10-13 RX ADMIN — SODIUM CHLORIDE 4 MILLILITER(S): 9 INJECTION, SOLUTION INTRAMUSCULAR; INTRAVENOUS; SUBCUTANEOUS at 00:09

## 2024-10-13 RX ADMIN — HYDRALAZINE HYDROCHLORIDE 50 MILLIGRAM(S): 50 TABLET, FILM COATED ORAL at 06:02

## 2024-10-13 RX ADMIN — HYDRALAZINE HYDROCHLORIDE 100 MILLIGRAM(S): 50 TABLET, FILM COATED ORAL at 21:40

## 2024-10-13 RX ADMIN — SODIUM CHLORIDE 4 MILLILITER(S): 9 INJECTION, SOLUTION INTRAMUSCULAR; INTRAVENOUS; SUBCUTANEOUS at 11:58

## 2024-10-13 RX ADMIN — Medication 10 MILLIGRAM(S): at 06:02

## 2024-10-13 RX ADMIN — HYDRALAZINE HYDROCHLORIDE 10 MILLIGRAM(S): 50 TABLET, FILM COATED ORAL at 03:59

## 2024-10-13 RX ADMIN — Medication 4: at 11:01

## 2024-10-13 RX ADMIN — Medication 1 TABLET(S): at 17:31

## 2024-10-13 RX ADMIN — IPRATROPIUM BROMIDE AND ALBUTEROL SULFATE 3 MILLILITER(S): .5; 2.5 SOLUTION RESPIRATORY (INHALATION) at 21:18

## 2024-10-13 RX ADMIN — IPRATROPIUM BROMIDE AND ALBUTEROL SULFATE 3 MILLILITER(S): .5; 2.5 SOLUTION RESPIRATORY (INHALATION) at 11:58

## 2024-10-13 RX ADMIN — Medication 2: at 06:40

## 2024-10-13 RX ADMIN — IPRATROPIUM BROMIDE AND ALBUTEROL SULFATE 3 MILLILITER(S): .5; 2.5 SOLUTION RESPIRATORY (INHALATION) at 05:07

## 2024-10-13 RX ADMIN — PIPERACILLIN AND TAZOBACTAM 25 GRAM(S): .5; 4 INJECTION, POWDER, LYOPHILIZED, FOR SOLUTION INTRAVENOUS at 21:36

## 2024-10-13 RX ADMIN — Medication 60 MILLIGRAM(S): at 13:00

## 2024-10-13 RX ADMIN — SODIUM CHLORIDE 4 MILLILITER(S): 9 INJECTION, SOLUTION INTRAMUSCULAR; INTRAVENOUS; SUBCUTANEOUS at 05:08

## 2024-10-13 RX ADMIN — Medication 4: at 17:31

## 2024-10-13 RX ADMIN — Medication 4 UNIT(S): at 21:39

## 2024-10-13 NOTE — PROGRESS NOTE ADULT - SUBJECTIVE AND OBJECTIVE BOX
Pt is a 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    this morning I was called to bedside for hypoxia sp02 in l02 70s   HFNC increase from 80% fi02 to 100%. flow remains on 60  Pt was NT suction by me after manual chest PT   + return of secretions   His position was optimized on L lung side down   STAT duoneb and nebulized saline   His sp02 then increased to low to mid 90s on 100% fi02  Chest PT started at this time as well   tube feeds held at this time  Given his recent intubation / bronchoscopy and now recurrent mucous plugging he remains extremely high risk of reintubation and tracheostomy   cont zosyn for pna   anti-medication restarted , keep SBP less than 180  currently off nitro infusion       Date of entry of this note is equal to the date of services rendered     Critical Care time: 41 mins assessing presenting problems of acute illness that poses high probability of life threatening deterioration or end organ damage/dysfunction.  Medical decision making inculding Initiating plan of care, reviewing data, reviewing radiology,direct patient bedside evaluation and interpretation of vital signs, any necessary ventilator management , discusion with multidisciplinary team, discussing goals of care with patient/family, all non inclusive of procedures

## 2024-10-13 NOTE — PROGRESS NOTE ADULT - SUBJECTIVE AND OBJECTIVE BOX
[facility] NEPHROLOGY SERVICES, [facility]  NEPHROLOGY AND HYPERTENSION  [address]  [address]  [place] [address]  [phone]    MD RONNA CHANG MD YELENA ROSENBERG, MD BINNY KOSHY, MD CHRISTOPHER CAPUTO, MD EDWARD BOVER, MD          Patient events noted    MEDICATIONS  (STANDING):  albuterol/ipratropium for Nebulization 3 milliLiter(s) Nebulizer every 6 hours  amLODIPine   Tablet 10 milliGRAM(s) Oral daily  atorvastatin 20 milliGRAM(s) Oral at bedtime  chlorhexidine 2% Cloths 1 Application(s) Topical <User Schedule>  diltiazem    Tablet 60 milliGRAM(s) Oral three times a day  ferrous    sulfate Liquid 300 milliGRAM(s) Enteral Tube daily  hydrALAZINE 100 milliGRAM(s) Oral every 8 hours  insulin glargine Injectable (LANTUS) 4 Unit(s) SubCutaneous at bedtime  insulin lispro (ADMELOG) corrective regimen sliding scale   SubCutaneous every 6 hours  Nephro-noam 1 Tablet(s) Oral daily  pantoprazole   Suspension 40 milliGRAM(s) Enteral Tube daily  piperacillin/tazobactam IVPB.. 4.5 Gram(s) IV Intermittent every 12 hours  polyethylene glycol 3350 17 Gram(s) Oral daily  sodium chloride 3%  Inhalation 4 milliLiter(s) Inhalation every 6 hours  sodium chloride 3%  Inhalation 4 milliLiter(s) Inhalation every 6 hours  trimethoprim  160 mG/sulfamethoxazole 800 mG 1 Tablet(s) Oral every 12 hours    MEDICATIONS  (PRN):  hydrALAZINE Injectable 10 milliGRAM(s) IV Push every 4 hours PRN SBP > 180      10-12-24 @ 07:01  -  10-13-24 @ 07:00  --------------------------------------------------------  IN: 220 mL / OUT: 2500 mL / NET: -2280 mL    10-13-24 @ 07:01  -  10-13-24 @ 22:37  --------------------------------------------------------  IN: 210 mL / OUT: 0 mL / NET: 210 mL      PHYSICAL EXAM:      T(C): 37 (10-13-24 @ 22:00), Max: 37.6 (10-13-24 @ 10:30)  HR: 91 (10-13-24 @ 22:00) (85 - 103)  BP: --  RR: 26 (10-13-24 @ 22:00) (2 - 47)  SpO2: 98% (10-13-24 @ 22:00) (81% - 100%)  Wt(kg): --  Lungs clear  Heart S1S2  Abd soft NT ND  Extremities:   tr edema                                    9.0    34.18 )-----------( 281      ( 13 Oct 2024 05:18 )             27.4     10-13    136  |  99  |  36[H]  ----------------------------<  175[H]  4.3   |  23  |  2.44[H]    Ca    9.5      13 Oct 2024 05:18  Phos  3.3     10-13  Mg     2.2     10-13    TPro  7.3  /  Alb  2.3[L]  /  TBili  0.7  /  DBili  x   /  AST  23  /  ALT  23  /  AlkPhos  131[H]  10-13    ABG - ( 13 Oct 2024 22:28 )  pH, Arterial: 7.39  pH, Blood: x     /  pCO2: 40    /  pO2: 77    / HCO3: 24    / Base Excess: -0.7  /  SaO2: 97.5              LIVER FUNCTIONS - ( 13 Oct 2024 05:18 )  Alb: 2.3 g/dL / Pro: 7.3 gm/dL / ALK PHOS: 131 U/L / ALT: 23 U/L / AST: 23 U/L / GGT: x           Creatinine Trend: 2.44<--, 3.12<--, 3.69<--, 2.32<--, 3.63<--, 3.28<--        Assessment   ESRD, PNA, Right pleural effusion, worsening hypoxia and right lung atelectasis   Accelerated HTN improved     Plan:  HD for tomorrow  UF 2.5 Kg  [facility] follow up     Delonte Moya MD

## 2024-10-13 NOTE — PROGRESS NOTE ADULT - SUBJECTIVE AND OBJECTIVE BOX
INTERVAL HPI/OVERNIGHT EVENTS:   HPI:  Megha Art is a 73 year old male with PMHx of HTN, IDDM2, ESRD on HD (M/W/F, through RUE AV fistula), hx of CVA x 2 (with residual R. sided weakness and dysphagia s/p PEG tube, previously with tracheostomy and now removed), and hx of RLE DVT (completed apixaban course) who presented to the ED on 10/4/24 for complaints of cough and chills.    History obtained from wife at bedside. As per wife, patient typically coughs at baseline since he previously had a tracheostomy. However, for the past 2-3 days, he has been coughing more frequently and it has been productive with yellow phlegm. Wife was assisting him with getting dressed this afternoon around 1 PM when he felt nauseous and started complaining of abdominal pain. Then had a bowel movement. When wife was cleaning him up, patient was shaking "like he had chills" and she thinks his eyes rolled back for a few seconds. No loss of consciousness. EMS was called and another episode of shaking upon EMS arrival. Of note, patient is due for dialysis today but did not receive it since he came to the ER today. Baseline functional status is wheelchair bound and dependent with all ADLs. NPO with Nepro q4. Lives at home with wife.     In the ED, Tmax 101.3, HR as elevated as 118, BP as elevated as 201/68, and SpO2 as low as 91% on room air. Initially placed on 15L NRB with improvement of SpO2 to 95%. Now on room air. WBC 17.38K, hgb 9.9, sodium 129, BUN 61, Cr 4.05, alkphos 169. Lactic acid 3.6. CT head without evidence of acute hemorrhage mass or mass effect but with encephalomalacia and gliosis again seen involving the bifrontal region. CT CAP with pneumonia and rectum distended with stool. Received  cc bolus, vancomycin 1 g IV, zosyn 3.375 g IV, acetaminophen 1 g IV, hydralazine 10 mg IV, pantoprazole 40 mg IV, and ondansetron 4 mg IV. Evaluated by nephrology who is planning for HD tomorrow. (04 Oct 2024 22:03)      CENTRAL LINE: [ ] YES [ ] NO  LOCATION:   DATE INSERTED:  REMOVE: [ ] YES [ ] NO  EXPLAIN:    MCGILL: [ ] YES [ ] NO    DATE INSERTED:  REMOVE:  [ ] YES [ ] NO  EXPLAIN:    A-LINE:  [ ] YES [ ] NO  LOCATION:   DATE INSERTED:  REMOVE:  [ ] YES [ ] NO  EXPLAIN:    PAST MEDICAL & SURGICAL HISTORY:  ESRD on hemodialysis      HTN (hypertension)      Insulin dependent type 2 diabetes mellitus      History of cerebrovascular accident (CVA) with residual deficit      History of DVT of lower extremity      Arteriovenous fistula      S/P percutaneous endoscopic gastrostomy (PEG) tube placement      History of tracheostomy          REVIEW OF SYSTEMS:    Negative ROS aside from HPI/Interval events above.    ICU Vital Signs Last 24 Hrs  T(C): 37.5 (13 Oct 2024 10:00), Max: 37.9 (12 Oct 2024 11:00)  T(F): 99.5 (13 Oct 2024 10:00), Max: 100.2 (12 Oct 2024 11:00)  HR: 97 (13 Oct 2024 10:00) (80 - 112)  BP: 157/67 (12 Oct 2024 12:00) (157/67 - 157/67)  BP(mean): 94 (12 Oct 2024 12:00) (94 - 94)  ABP: 151/40 (13 Oct 2024 10:00) (97/44 - 197/56)  ABP(mean): 78 (13 Oct 2024 10:00) (62 - 106)  RR: 24 (13 Oct 2024 10:00) (2 - 30)  SpO2: 99% (13 Oct 2024 10:00) (81% - 100%)    O2 Parameters below as of 13 Oct 2024 09:55  Patient On (Oxygen Delivery Method): nasal cannula, high flow  O2 Flow (L/min): 60  O2 Concentration (%): 80        ABG - ( 11 Oct 2024 13:51 )  pH, Arterial: 7.53  pH, Blood: x     /  pCO2: 34    /  pO2: 45    / HCO3: 28    / Base Excess: 5.5   /  SaO2: 79.4                I&O's Detail    12 Oct 2024 07:01  -  13 Oct 2024 07:00  --------------------------------------------------------  IN:    IV PiggyBack: 150 mL    Nepro with Carb Steady: 70 mL  Total IN: 220 mL    OUT:    Other (mL): 2500 mL  Total OUT: 2500 mL    Total NET: -2280 mL      13 Oct 2024 07:01  -  13 Oct 2024 10:37  --------------------------------------------------------  IN:    IV PiggyBack: 100 mL    Nepro with Carb Steady: 20 mL  Total IN: 120 mL    OUT:  Total OUT: 0 mL    Total NET: 120 mL            CAPILLARY BLOOD GLUCOSE      POCT Blood Glucose.: 198 mg/dL (13 Oct 2024 06:29)  POCT Blood Glucose.: 160 mg/dL (13 Oct 2024 00:57)  POCT Blood Glucose.: 129 mg/dL (12 Oct 2024 22:09)  POCT Blood Glucose.: 85 mg/dL (12 Oct 2024 17:00)  POCT Blood Glucose.: 102 mg/dL (12 Oct 2024 11:09)        PHYSICAL EXAM:    General: NAD comfortable    Neck: No JVD  Respiratory: Coarse b/l. on HFNC. Epistaxis from suctioning. Sats fluctuate based on patient ability to clear mucous/secretions.  Cardiovascular: S1 S2 RRR  Abdomen: Sof NT ND  Extremities: No edema or rash, R AVF  Skin: No wounds  Neurological: awakens. alert. calm. cooperative. Answers.      LABS:                        9.0    34.18 )-----------( 281      ( 13 Oct 2024 05:18 )             27.4      10-13    136  |  99  |  36[H]  ----------------------------<  175[H]  4.3   |  23  |  2.44[H]    Ca    9.5      13 Oct 2024 05:18  Phos  3.3     10-13  Mg     2.2     10-13    TPro  7.3  /  Alb  2.3[L]  /  TBili  0.7  /  DBili  x   /  AST  23  /  ALT  23  /  AlkPhos  131[H]  10-13    PT/INR - ( 12 Oct 2024 10:34 )   PT: 13.7 sec;   INR: 1.21 ratio         PTT - ( 12 Oct 2024 10:34 )  PTT:37.5 sec  Urinalysis Basic - ( 13 Oct 2024 05:18 )    Color: x / Appearance: x / SG: x / pH: x  Gluc: 175 mg/dL / Ketone: x  / Bili: x / Urobili: x   Blood: x / Protein: x / Nitrite: x   Leuk Esterase: x / RBC: x / WBC x   Sq Epi: x / Non Sq Epi: x / Bacteria: x

## 2024-10-13 NOTE — PROGRESS NOTE ADULT - ASSESSMENT
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    Neuro: awake alert.  CV: Septic SHock. Resolved. continue BP med adjustment.  Respiratory: Acute hypoxic respiratory failure requiring intubation. with RLL pneumonia. now extubated. worsening 2/2 inability to clear mucous/secretions. on HFNC. No epistaxis currently but still needs sucitoning. nasal trumpet in place. Titrate HFNC as tolerated.  GI: Continue feeds via PEG.  REnal: Hx ESRD on HD. HD as scheduled.  Endocrine: Hx DM. ISS and FS q6hrs. Lantus.  Heme: SCDs for dvt ppx. no hepSQ given epistaxis from continued/required suctioning.  ID: RLL PNA. with pseudomonas. On zosyn  Dispo: Unclear trajectory. High risk for reintubation. Spoke with Daughter at bedside.

## 2024-10-14 LAB
ALBUMIN SERPL ELPH-MCNC: 1.9 G/DL — LOW (ref 3.3–5)
ALP SERPL-CCNC: 112 U/L — SIGNIFICANT CHANGE UP (ref 40–120)
ALT FLD-CCNC: 15 U/L — SIGNIFICANT CHANGE UP (ref 12–78)
ANION GAP SERPL CALC-SCNC: 14 MMOL/L — SIGNIFICANT CHANGE UP (ref 5–17)
AST SERPL-CCNC: 11 U/L — LOW (ref 15–37)
BASE EXCESS BLDA CALC-SCNC: 1 MMOL/L — SIGNIFICANT CHANGE UP (ref -2–3)
BASOPHILS # BLD AUTO: 0.09 K/UL — SIGNIFICANT CHANGE UP (ref 0–0.2)
BASOPHILS NFR BLD AUTO: 0.2 % — SIGNIFICANT CHANGE UP (ref 0–2)
BILIRUB SERPL-MCNC: 0.4 MG/DL — SIGNIFICANT CHANGE UP (ref 0.2–1.2)
BLOOD GAS COMMENTS ARTERIAL: SIGNIFICANT CHANGE UP
BUN SERPL-MCNC: 49 MG/DL — HIGH (ref 7–23)
CALCIUM SERPL-MCNC: 8.9 MG/DL — SIGNIFICANT CHANGE UP (ref 8.5–10.1)
CHLORIDE SERPL-SCNC: 103 MMOL/L — SIGNIFICANT CHANGE UP (ref 96–108)
CO2 BLDA-SCNC: 24 MMOL/L — SIGNIFICANT CHANGE UP (ref 19–24)
CO2 SERPL-SCNC: 21 MMOL/L — LOW (ref 22–31)
CREAT SERPL-MCNC: 3.34 MG/DL — HIGH (ref 0.5–1.3)
EGFR: 19 ML/MIN/1.73M2 — LOW
EOSINOPHIL # BLD AUTO: 0 K/UL — SIGNIFICANT CHANGE UP (ref 0–0.5)
EOSINOPHIL NFR BLD AUTO: 0 % — SIGNIFICANT CHANGE UP (ref 0–6)
GAS PNL BLDA: SIGNIFICANT CHANGE UP
GAS PNL BLDA: SIGNIFICANT CHANGE UP
GLUCOSE BLDC GLUCOMTR-MCNC: 141 MG/DL — HIGH (ref 70–99)
GLUCOSE BLDC GLUCOMTR-MCNC: 146 MG/DL — HIGH (ref 70–99)
GLUCOSE BLDC GLUCOMTR-MCNC: 181 MG/DL — HIGH (ref 70–99)
GLUCOSE BLDC GLUCOMTR-MCNC: 232 MG/DL — HIGH (ref 70–99)
GLUCOSE BLDC GLUCOMTR-MCNC: 253 MG/DL — HIGH (ref 70–99)
GLUCOSE SERPL-MCNC: 201 MG/DL — HIGH (ref 70–99)
GRAM STN FLD: ABNORMAL
HCO3 BLDA-SCNC: 23 MMOL/L — SIGNIFICANT CHANGE UP (ref 21–28)
HCT VFR BLD CALC: 23.9 % — LOW (ref 39–50)
HGB BLD-MCNC: 8.2 G/DL — LOW (ref 13–17)
HOROWITZ INDEX BLDA+IHG-RTO: 100 — SIGNIFICANT CHANGE UP
IMM GRANULOCYTES NFR BLD AUTO: 1.1 % — HIGH (ref 0–0.9)
LYMPHOCYTES # BLD AUTO: 0.41 K/UL — LOW (ref 1–3.3)
LYMPHOCYTES # BLD AUTO: 1.1 % — LOW (ref 13–44)
MAGNESIUM SERPL-MCNC: 2.1 MG/DL — SIGNIFICANT CHANGE UP (ref 1.6–2.6)
MCHC RBC-ENTMCNC: 24.5 PG — LOW (ref 27–34)
MCHC RBC-ENTMCNC: 34.3 G/DL — SIGNIFICANT CHANGE UP (ref 32–36)
MCV RBC AUTO: 71.3 FL — LOW (ref 80–100)
MONOCYTES # BLD AUTO: 0.86 K/UL — SIGNIFICANT CHANGE UP (ref 0–0.9)
MONOCYTES NFR BLD AUTO: 2.3 % — SIGNIFICANT CHANGE UP (ref 2–14)
NEUTROPHILS # BLD AUTO: 35.48 K/UL — HIGH (ref 1.8–7.4)
NEUTROPHILS NFR BLD AUTO: 95.3 % — HIGH (ref 43–77)
NRBC # BLD: 0 /100 WBCS — SIGNIFICANT CHANGE UP (ref 0–0)
PCO2 BLDA: 29 MMHG — LOW (ref 32–46)
PH BLDA: 7.51 — HIGH (ref 7.35–7.45)
PHOSPHATE SERPL-MCNC: 3.3 MG/DL — SIGNIFICANT CHANGE UP (ref 2.5–4.5)
PLATELET # BLD AUTO: 212 K/UL — SIGNIFICANT CHANGE UP (ref 150–400)
PO2 BLDA: 297 MMHG — HIGH (ref 83–108)
POTASSIUM SERPL-MCNC: 3.5 MMOL/L — SIGNIFICANT CHANGE UP (ref 3.5–5.3)
POTASSIUM SERPL-SCNC: 3.5 MMOL/L — SIGNIFICANT CHANGE UP (ref 3.5–5.3)
PROCALCITONIN SERPL-MCNC: 18.3 NG/ML — HIGH (ref 0.02–0.1)
PROT SERPL-MCNC: 6.4 GM/DL — SIGNIFICANT CHANGE UP (ref 6–8.3)
RBC # BLD: 3.35 M/UL — LOW (ref 4.2–5.8)
RBC # FLD: 22.1 % — HIGH (ref 10.3–14.5)
SAO2 % BLDA: 100 % — HIGH (ref 94–98)
SODIUM SERPL-SCNC: 138 MMOL/L — SIGNIFICANT CHANGE UP (ref 135–145)
SPECIMEN SOURCE: SIGNIFICANT CHANGE UP
WBC # BLD: 37.26 K/UL — HIGH (ref 3.8–10.5)
WBC # FLD AUTO: 37.26 K/UL — HIGH (ref 3.8–10.5)

## 2024-10-14 PROCEDURE — 71045 X-RAY EXAM CHEST 1 VIEW: CPT | Mod: 26

## 2024-10-14 PROCEDURE — 99292 CRITICAL CARE ADDL 30 MIN: CPT | Mod: 25

## 2024-10-14 PROCEDURE — 31624 DX BRONCHOSCOPE/LAVAGE: CPT

## 2024-10-14 PROCEDURE — 31500 INSERT EMERGENCY AIRWAY: CPT

## 2024-10-14 PROCEDURE — 99291 CRITICAL CARE FIRST HOUR: CPT | Mod: 25

## 2024-10-14 RX ORDER — PROPOFOL 10 MG/ML
30 INJECTION, EMULSION INTRAVENOUS
Qty: 1000 | Refills: 0 | Status: DISCONTINUED | OUTPATIENT
Start: 2024-10-14 | End: 2024-10-15

## 2024-10-14 RX ORDER — INSULIN GLARGINE,HUM.REC.ANLOG 100/ML
10 VIAL (ML) SUBCUTANEOUS AT BEDTIME
Refills: 0 | Status: DISCONTINUED | OUTPATIENT
Start: 2024-10-14 | End: 2024-10-16

## 2024-10-14 RX ORDER — CHLORHEXIDINE GLUCONATE 40 MG/ML
15 SOLUTION TOPICAL EVERY 12 HOURS
Refills: 0 | Status: DISCONTINUED | OUTPATIENT
Start: 2024-10-14 | End: 2024-10-23

## 2024-10-14 RX ORDER — NOREPINEPHRINE BITARTRATE 1 MG/ML
0.05 INJECTION, SOLUTION, CONCENTRATE INTRAVENOUS
Qty: 8 | Refills: 0 | Status: DISCONTINUED | OUTPATIENT
Start: 2024-10-14 | End: 2024-10-17

## 2024-10-14 RX ORDER — MIDAZOLAM IN 5 % DEXTROSE/PF 15 MG/30ML
6 SYRINGE (ML) INTRAVENOUS ONCE
Refills: 0 | Status: DISCONTINUED | OUTPATIENT
Start: 2024-10-14 | End: 2024-10-14

## 2024-10-14 RX ORDER — HEPARIN SODIUM 10000 [USP'U]/ML
5000 INJECTION INTRAVENOUS; SUBCUTANEOUS EVERY 8 HOURS
Refills: 0 | Status: DISCONTINUED | OUTPATIENT
Start: 2024-10-14 | End: 2024-10-17

## 2024-10-14 RX ORDER — SENNA 187 MG
2 TABLET ORAL AT BEDTIME
Refills: 0 | Status: DISCONTINUED | OUTPATIENT
Start: 2024-10-14 | End: 2024-10-23

## 2024-10-14 RX ORDER — FENTANYL CITRAT/DEXTROSE 5%/PF 1250MCG/50
50 PATIENT CONTROLLED ANALGESIA SYRINGE INTRAVENOUS ONCE
Refills: 0 | Status: DISCONTINUED | OUTPATIENT
Start: 2024-10-14 | End: 2024-10-14

## 2024-10-14 RX ADMIN — Medication 6 MILLIGRAM(S): at 09:26

## 2024-10-14 RX ADMIN — PIPERACILLIN AND TAZOBACTAM 25 GRAM(S): .5; 4 INJECTION, POWDER, LYOPHILIZED, FOR SOLUTION INTRAVENOUS at 11:58

## 2024-10-14 RX ADMIN — HYDRALAZINE HYDROCHLORIDE 100 MILLIGRAM(S): 50 TABLET, FILM COATED ORAL at 05:38

## 2024-10-14 RX ADMIN — Medication 20 MILLIGRAM(S): at 21:24

## 2024-10-14 RX ADMIN — CHLORHEXIDINE GLUCONATE 1 APPLICATION(S): 40 SOLUTION TOPICAL at 05:40

## 2024-10-14 RX ADMIN — PROPOFOL 8.05 MICROGRAM(S)/KG/MIN: 10 INJECTION, EMULSION INTRAVENOUS at 09:27

## 2024-10-14 RX ADMIN — IPRATROPIUM BROMIDE AND ALBUTEROL SULFATE 3 MILLILITER(S): .5; 2.5 SOLUTION RESPIRATORY (INHALATION) at 17:37

## 2024-10-14 RX ADMIN — HEPARIN SODIUM 5000 UNIT(S): 10000 INJECTION INTRAVENOUS; SUBCUTANEOUS at 21:24

## 2024-10-14 RX ADMIN — CHLORHEXIDINE GLUCONATE 15 MILLILITER(S): 40 SOLUTION TOPICAL at 17:19

## 2024-10-14 RX ADMIN — HEPARIN SODIUM 5000 UNIT(S): 10000 INJECTION INTRAVENOUS; SUBCUTANEOUS at 14:30

## 2024-10-14 RX ADMIN — Medication 1 TABLET(S): at 14:31

## 2024-10-14 RX ADMIN — Medication 10 MILLIGRAM(S): at 05:38

## 2024-10-14 RX ADMIN — SODIUM CHLORIDE 4 MILLILITER(S): 9 INJECTION, SOLUTION INTRAMUSCULAR; INTRAVENOUS; SUBCUTANEOUS at 00:08

## 2024-10-14 RX ADMIN — Medication 2: at 17:21

## 2024-10-14 RX ADMIN — Medication 6: at 05:38

## 2024-10-14 RX ADMIN — Medication 50 MICROGRAM(S): at 22:29

## 2024-10-14 RX ADMIN — Medication 2 TABLET(S): at 21:24

## 2024-10-14 RX ADMIN — IPRATROPIUM BROMIDE AND ALBUTEROL SULFATE 3 MILLILITER(S): .5; 2.5 SOLUTION RESPIRATORY (INHALATION) at 00:09

## 2024-10-14 RX ADMIN — POLYETHYLENE GLYCOL 3350 17 GRAM(S): 17 POWDER, FOR SOLUTION ORAL at 11:59

## 2024-10-14 RX ADMIN — SODIUM CHLORIDE 4 MILLILITER(S): 9 INJECTION, SOLUTION INTRAMUSCULAR; INTRAVENOUS; SUBCUTANEOUS at 05:18

## 2024-10-14 RX ADMIN — PIPERACILLIN AND TAZOBACTAM 25 GRAM(S): .5; 4 INJECTION, POWDER, LYOPHILIZED, FOR SOLUTION INTRAVENOUS at 21:24

## 2024-10-14 RX ADMIN — IPRATROPIUM BROMIDE AND ALBUTEROL SULFATE 3 MILLILITER(S): .5; 2.5 SOLUTION RESPIRATORY (INHALATION) at 05:18

## 2024-10-14 RX ADMIN — PANTOPRAZOLE SODIUM 40 MILLIGRAM(S): 40 TABLET, DELAYED RELEASE ORAL at 11:58

## 2024-10-14 RX ADMIN — PROPOFOL 8.05 MICROGRAM(S)/KG/MIN: 10 INJECTION, EMULSION INTRAVENOUS at 21:49

## 2024-10-14 RX ADMIN — Medication 1 TABLET(S): at 17:20

## 2024-10-14 RX ADMIN — Medication 10 UNIT(S): at 21:24

## 2024-10-14 RX ADMIN — Medication 60 MILLIGRAM(S): at 05:39

## 2024-10-14 RX ADMIN — IPRATROPIUM BROMIDE AND ALBUTEROL SULFATE 3 MILLILITER(S): .5; 2.5 SOLUTION RESPIRATORY (INHALATION) at 23:46

## 2024-10-14 RX ADMIN — Medication 50 MICROGRAM(S): at 23:49

## 2024-10-14 RX ADMIN — Medication 300 MILLIGRAM(S): at 14:31

## 2024-10-14 RX ADMIN — Medication 1 TABLET(S): at 05:39

## 2024-10-14 RX ADMIN — IPRATROPIUM BROMIDE AND ALBUTEROL SULFATE 3 MILLILITER(S): .5; 2.5 SOLUTION RESPIRATORY (INHALATION) at 11:49

## 2024-10-14 RX ADMIN — Medication 4: at 12:16

## 2024-10-14 NOTE — PROGRESS NOTE ADULT - SUBJECTIVE AND OBJECTIVE BOX
CHIEF COMPLAINT: Acute hypoxic respiratory failure     Interval Events: The patient acutely desaturated this morning during rounds.  And did have recurrent aspirations.  O2 sats in the low 80s while on max high flow nasal cannula.  Patient was intubated and placed back on mechanical ventilation.  Emergent bronchoscopy was performed at the bedside thick mucus aspirated from both the right and left lungs.  BAL performed of the right middle lobe for further testing for infection.  Discussed with the patient's wife on overall prognosis.  At this time she just wishes to have her family see the patient.  Plans on palliative extubating and withdrawing care and moving towards comfort following visitation from family.    REVIEW OF SYSTEMS:    Unable to perform ROS due to lack of mental status       OBJECTIVE:  ICU Vital Signs Last 24 Hrs  T(C): 36.4 (14 Oct 2024 13:00), Max: 37.5 (13 Oct 2024 14:00)  T(F): 97.5 (14 Oct 2024 13:00), Max: 99.5 (13 Oct 2024 14:00)  HR: 75 (14 Oct 2024 13:00) (66 - 96)  BP: --  BP(mean): --  ABP: 139/43 (14 Oct 2024 13:00) (90/37 - 199/48)  ABP(mean): 75 (14 Oct 2024 13:00) (55 - 102)  RR: 25 (14 Oct 2024 13:00) (17 - 31)  SpO2: 100% (14 Oct 2024 13:00) (85% - 100%)    O2 Parameters below as of 14 Oct 2024 12:12  Patient On (Oxygen Delivery Method): ventilator          Mode: AC/ CMV (Assist Control/ Continuous Mandatory Ventilation), RR (machine): 16, TV (machine): 400, FiO2: 100, PEEP: 12, ITime: 1, MAP: 20, PIP: 44    10-13 @ 07:01  -  10-14 @ 07:00  --------------------------------------------------------  IN: 290 mL / OUT: 0 mL / NET: 290 mL      CAPILLARY BLOOD GLUCOSE      POCT Blood Glucose.: 232 mg/dL (14 Oct 2024 12:10)      PHYSICAL EXAM:  GENERAL: NAD, well-groomed, well-developed  EYES: EOMI, PERRLA, conjunctiva and sclera clear  ENMT: ET tube in place   CHEST/LUNG: Clear to auscultation bilaterally; No rales, rhonchi, wheezing, or rubs  HEART: Regular rate and rhythm; No murmurs, rubs, or gallops  ABDOMEN: Soft, Nontender, Nondistended; Bowel sounds present  VASCULAR:  2+ Peripheral Pulses, No clubbing, cyanosis, or edema  LYMPH: No lymphadenopathy noted  SKIN: No rashes or lesions  NERVOUS SYSTEM:  Alert & Oriented X0    LINES:    HOSPITAL MEDICATIONS:  heparin   Injectable 5000 Unit(s) SubCutaneous every 8 hours    piperacillin/tazobactam IVPB.. 4.5 Gram(s) IV Intermittent every 12 hours  trimethoprim  160 mG/sulfamethoxazole 800 mG 1 Tablet(s) Oral every 12 hours    amLODIPine   Tablet 10 milliGRAM(s) Oral daily  diltiazem    Tablet 60 milliGRAM(s) Oral three times a day  hydrALAZINE 100 milliGRAM(s) Oral every 8 hours  hydrALAZINE Injectable 10 milliGRAM(s) IV Push every 4 hours PRN  norepinephrine Infusion 0.05 MICROgram(s)/kG/Min IV Continuous <Continuous>    atorvastatin 20 milliGRAM(s) Oral at bedtime  insulin glargine Injectable (LANTUS) 10 Unit(s) SubCutaneous at bedtime  insulin lispro (ADMELOG) corrective regimen sliding scale   SubCutaneous every 6 hours    albuterol/ipratropium for Nebulization 3 milliLiter(s) Nebulizer every 6 hours  sodium chloride 3%  Inhalation 4 milliLiter(s) Inhalation every 6 hours    propofol Infusion 30 MICROgram(s)/kG/Min IV Continuous <Continuous>    pantoprazole   Suspension 40 milliGRAM(s) Enteral Tube daily  polyethylene glycol 3350 17 Gram(s) Oral daily  senna 2 Tablet(s) Oral at bedtime        ferrous    sulfate Liquid 300 milliGRAM(s) Enteral Tube daily  Nephro-noam 1 Tablet(s) Oral daily      chlorhexidine 0.12% Liquid 15 milliLiter(s) Oral Mucosa every 12 hours  chlorhexidine 2% Cloths 1 Application(s) Topical <User Schedule>        LABS:                        8.2    37.26 )-----------( 212      ( 14 Oct 2024 03:05 )             23.9     Hgb Trend: 8.2<--, 9.0<--, 9.0<--, 9.3<--, 7.5<--  10-14    138  |  103  |  49[H]  ----------------------------<  201[H]  3.5   |  21[L]  |  3.34[H]    Ca    8.9      14 Oct 2024 03:05  Phos  3.3     10-14  Mg     2.1     10-14    TPro  6.4  /  Alb  1.9[L]  /  TBili  0.4  /  DBili  x   /  AST  11[L]  /  ALT  15  /  AlkPhos  112  10-14    Creatinine Trend: 3.34<--, 2.44<--, 3.12<--, 3.69<--, 2.32<--, 3.63<--    Urinalysis Basic - ( 14 Oct 2024 03:05 )    Color: x / Appearance: x / SG: x / pH: x  Gluc: 201 mg/dL / Ketone: x  / Bili: x / Urobili: x   Blood: x / Protein: x / Nitrite: x   Leuk Esterase: x / RBC: x / WBC x   Sq Epi: x / Non Sq Epi: x / Bacteria: x      Arterial Blood Gas:  10-14 @ 10:37  7.41/35/76/22/97.2/-2.1  ABG lactate: --  Arterial Blood Gas:  10-13 @ 22:28  7.39/40/77/24/97.5/-0.7  ABG lactate: --        MICROBIOLOGY:     RADIOLOGY:  [ ] Reviewed and interpreted by me    EKG:

## 2024-10-14 NOTE — PROGRESS NOTE ADULT - ASSESSMENT
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    Neuro: Patient currently sedated with propofol.  Maintain sedation with vent synchrony.  Cardiovascular: Patient in distributive shock secondary to sedation from propofol.  Continue with Levophed with a MAP goal greater than 65.  Respiratory: Patient with acute hyper respiratory failure secondary to aspiration.  Patient with recurrent aspirations following extubation and required reintubation today.  Bronchoscopy performed at the bedside with therapeutic aspiration performed of the right and left lung, copious thick mucus found from both sides of the lung.. Continue with airway clearance.  Titrate ventilator settings based on arterial blood gas.  Following discussion with the patient's wife will plan for palliative extubation following arrival the rest of family  GI: Continue with tube feeds via PEG tube  Renal: Patient with ESRD on hemodialysis.  Will hold off on hemodialysis due to patient's family desire to move towards comfort.    ID: Can continue with Zosyn and Bactrim for treatment of Pseudomonas and stenotrophomonas actively.  Repeat BAL culture sent today from today's bronchoscopy.  Endo: Fingersticks every 6 hours and insulin sliding scale.  Increase Lantus to 10 units nightly  Heme: SCDs for DVT prophylaxis will resume heparin subcu.  Ethics: Patient currently full code but will be transition to DNR/DNI following of the ventilator and transition to comfort care.

## 2024-10-14 NOTE — PROCEDURE NOTE - NSPROCDETAILS_GEN_ALL_CORE
patient pre-oxygenated, tube inserted, placement confirmed
location identified, draped/prepped, sterile technique used, needle inserted/introduced/positive blood return obtained via catheter/connected to a pressurized flush line/sutured in place/hemostasis with direct pressure, dressing applied/Seldinger technique/all materials/supplies accounted for at end of procedure
patient pre-oxygenated, tube inserted, placement confirmed
guidewire recovered/lumen(s) aspirated and flushed/sterile dressing applied/sterile technique, catheter placed/ultrasound guidance with use of sterile gel and probe cove
dynamic US guidance of 2 PIV placement/blood seen on insertion/dressing applied/flushes easily/secured in place/sterile technique, catheter placed

## 2024-10-14 NOTE — PROCEDURE NOTE - NSBRONCHPROCDETAILS_GEN_A_CORE_FT
Findings:  Bronchoscope inserted through ETT. ETT noted to be in good position. Airway evaluation revealed R mainstem being occluded by thick white secretions. Multiple lavages done with subsequent viewings demonstrating plugging through RUL and RLL. Suctioned and lavaged until clear. 2 BAL samples collected. L side clear throughout. Bronchoscope then withdrawn from ETT.    Specimens: 2 BAL samples
Findings:  Bronchoscope inserted through ETT. ETT noted to be in good position. Airway evaluation revealed Thick copious secretions throughout the main trachea, right mainstem bronchus, right bronchus intermedius, left mainstem bronchus, left lower lobe, and left upper lobe.  Therapeutic aspirations performed throughout both the right and left lungs.  Bronchial alveolar lavage performed with 30 mL of normal saline in the right middle lobe.  Bronchoscope then withdrawn from ETT.          Gagan Man MD   Pulmonary/Critical Care Attending

## 2024-10-14 NOTE — PROGRESS NOTE ADULT - CONVERSATION DETAILS
The patient acutely desaturated this morning during rounds.  And did have recurrent aspirations.  O2 sats in the low 80s while on max high flow nasal cannula.  Patient was intubated and placed back on mechanical ventilation.  Emergent bronchoscopy was performed at the bedside thick mucus aspirated from both the right and left lungs.  BAL performed of the right middle lobe for further testing for infection.  Discussed with the patient's wife on overall prognosis.  At this time she just wishes to have her family see the patient.  Plans on palliative extubating and withdrawing care and moving towards comfort following visitation from family.

## 2024-10-14 NOTE — PROCEDURE NOTE - PROCEDURE DATE TIME, MLM
08-Oct-2024 19:00
13-Oct-2024 12:34
08-Oct-2024 17:14
08-Oct-2024 17:18
14-Oct-2024 09:30
08-Oct-2024 18:40
14-Oct-2024 09:30

## 2024-10-14 NOTE — PROGRESS NOTE ADULT - SUBJECTIVE AND OBJECTIVE BOX
[facility] NEPHROLOGY SERVICES, [facility]  NEPHROLOGY AND HYPERTENSION  [address]  [address]  [place] [address]  [phone]    MD RONNA CHANG MD YELENA ROSENBERG, MD BINNY KOSHY, MD CHRISTOPHER CAPUTO, MD EDWARD BOVER, MD          Patient events noted    MEDICATIONS  (STANDING):  albuterol/ipratropium for Nebulization 3 milliLiter(s) Nebulizer every 6 hours  amLODIPine   Tablet 10 milliGRAM(s) Oral daily  atorvastatin 20 milliGRAM(s) Oral at bedtime  chlorhexidine 0.12% Liquid 15 milliLiter(s) Oral Mucosa every 12 hours  chlorhexidine 2% Cloths 1 Application(s) Topical <User Schedule>  diltiazem    Tablet 60 milliGRAM(s) Oral three times a day  ferrous    sulfate Liquid 300 milliGRAM(s) Enteral Tube daily  heparin   Injectable 5000 Unit(s) SubCutaneous every 8 hours  hydrALAZINE 100 milliGRAM(s) Oral every 8 hours  insulin glargine Injectable (LANTUS) 10 Unit(s) SubCutaneous at bedtime  insulin lispro (ADMELOG) corrective regimen sliding scale   SubCutaneous every 6 hours  Nephro-noam 1 Tablet(s) Oral daily  norepinephrine Infusion 0.05 MICROgram(s)/kG/Min (4.19 mL/Hr) IV Continuous <Continuous>  pantoprazole   Suspension 40 milliGRAM(s) Enteral Tube daily  piperacillin/tazobactam IVPB.. 4.5 Gram(s) IV Intermittent every 12 hours  polyethylene glycol 3350 17 Gram(s) Oral daily  propofol Infusion 30 MICROgram(s)/kG/Min (8.05 mL/Hr) IV Continuous <Continuous>  senna 2 Tablet(s) Oral at bedtime  sodium chloride 3%  Inhalation 4 milliLiter(s) Inhalation every 6 hours  trimethoprim  160 mG/sulfamethoxazole 800 mG 1 Tablet(s) Oral every 12 hours    MEDICATIONS  (PRN):  hydrALAZINE Injectable 10 milliGRAM(s) IV Push every 4 hours PRN SBP > 180      10-13-24 @ 07:01  -  10-14-24 @ 07:00  --------------------------------------------------------  IN: 290 mL / OUT: 0 mL / NET: 290 mL    10-14-24 @ 07:01  -  10-14-24 @ 22:38  --------------------------------------------------------  IN: 493.6 mL / OUT: 0 mL / NET: 493.6 mL      PHYSICAL EXAM:      T(C): 37.3 (10-14-24 @ 22:21), Max: 37.4 (10-14-24 @ 21:00)  HR: 78 (10-14-24 @ 22:21) (66 - 96)  BP: --  RR: 26 (10-14-24 @ 22:21) (17 - 30)  SpO2: 95% (10-14-24 @ 22:21) (85% - 100%)  Wt(kg): --  Lungs ant rhonchi  Heart S1S2  Abd soft NT ND  Extremities:   tr edema                                    8.2    37.26 )-----------( 212      ( 14 Oct 2024 03:05 )             23.9     10-14    138  |  103  |  49[H]  ----------------------------<  201[H]  3.5   |  21[L]  |  3.34[H]    Ca    8.9      14 Oct 2024 03:05  Phos  3.3     10-14  Mg     2.1     10-14    TPro  6.4  /  Alb  1.9[L]  /  TBili  0.4  /  DBili  x   /  AST  11[L]  /  ALT  15  /  AlkPhos  112  10-14    ABG - ( 14 Oct 2024 21:12 )  pH, Arterial: 7.51  pH, Blood: x     /  pCO2: 29    /  pO2: 297   / HCO3: 23    / Base Excess: 1.0   /  SaO2: 100.0             LIVER FUNCTIONS - ( 14 Oct 2024 03:05 )  Alb: 1.9 g/dL / Pro: 6.4 gm/dL / ALK PHOS: 112 U/L / ALT: 15 U/L / AST: 11 U/L / GGT: x           Creatinine Trend: 3.34<--, 2.44<--, 3.12<--, 3.69<--, 2.32<--, 3.63<--  Mode: AC/ CMV (Assist Control/ Continuous Mandatory Ventilation)  RR (machine): 16  TV (machine): 400  FiO2: 80  PEEP: 12  ITime: 1  MAP: 19  PIP: 40      Assessment   ESRD, chronic aspiration, respiratory failure     Plan:  Family has decided on comfort care only.  No further HD    Delonte Moya MD

## 2024-10-14 NOTE — PROCEDURE NOTE - NSTRACHINTUBMED_RESP_A_CORE
rocuronium injectable/midazolam infusion/propofol infusion
midazolam injectable/propofol injectable/rocuronium injectable

## 2024-10-14 NOTE — PROCEDURE NOTE - NSPROCNAME_GEN_A_CORE
Arterial Puncture/Cannulation
Bronchoscopy
Bronchoscopy
Peripheral Line Insertion
Tracheal Intubation
Central Line Insertion
Tracheal Intubation

## 2024-10-14 NOTE — PROCEDURE NOTE - NSINDICATIONS_GEN_A_CORE
emergency venous access
critical illness
airway protection/respiratory failure
critical patient/respiratory distress/respiratory failure
arterial puncture to obtain ABG's/critical patient/monitoring purposes

## 2024-10-14 NOTE — PROCEDURE NOTE - NSINFORMCONSENT_GEN_A_CORE
Benefits, risks, and possible complications of procedure explained to patient/caregiver who verbalized understanding and gave verbal consent.
This was an emergent procedure.
Benefits, risks, and possible complications of procedure explained to patient/caregiver who verbalized understanding and gave verbal consent.
This was an emergent procedure.
This was an emergent procedure.

## 2024-10-15 LAB
ANION GAP SERPL CALC-SCNC: 17 MMOL/L — SIGNIFICANT CHANGE UP (ref 5–17)
BASE EXCESS BLDA CALC-SCNC: 0.4 MMOL/L — SIGNIFICANT CHANGE UP (ref -2–3)
BLOOD GAS COMMENTS ARTERIAL: SIGNIFICANT CHANGE UP
BUN SERPL-MCNC: 66 MG/DL — HIGH (ref 7–23)
CALCIUM SERPL-MCNC: 9.5 MG/DL — SIGNIFICANT CHANGE UP (ref 8.5–10.1)
CHLORIDE SERPL-SCNC: 99 MMOL/L — SIGNIFICANT CHANGE UP (ref 96–108)
CO2 BLDA-SCNC: 24 MMOL/L — SIGNIFICANT CHANGE UP (ref 19–24)
CO2 SERPL-SCNC: 21 MMOL/L — LOW (ref 22–31)
CREAT SERPL-MCNC: 4.66 MG/DL — HIGH (ref 0.5–1.3)
EGFR: 13 ML/MIN/1.73M2 — LOW
GAS PNL BLDA: SIGNIFICANT CHANGE UP
GLUCOSE BLDC GLUCOMTR-MCNC: 117 MG/DL — HIGH (ref 70–99)
GLUCOSE BLDC GLUCOMTR-MCNC: 161 MG/DL — HIGH (ref 70–99)
GLUCOSE BLDC GLUCOMTR-MCNC: 163 MG/DL — HIGH (ref 70–99)
GLUCOSE BLDC GLUCOMTR-MCNC: 168 MG/DL — HIGH (ref 70–99)
GLUCOSE SERPL-MCNC: 157 MG/DL — HIGH (ref 70–99)
HCO3 BLDA-SCNC: 23 MMOL/L — SIGNIFICANT CHANGE UP (ref 21–28)
HCT VFR BLD CALC: 22.8 % — LOW (ref 39–50)
HGB BLD-MCNC: 8 G/DL — LOW (ref 13–17)
HOROWITZ INDEX BLDA+IHG-RTO: 80 — SIGNIFICANT CHANGE UP
MAGNESIUM SERPL-MCNC: 2.4 MG/DL — SIGNIFICANT CHANGE UP (ref 1.6–2.6)
MCHC RBC-ENTMCNC: 23.6 PG — LOW (ref 27–34)
MCHC RBC-ENTMCNC: 35.1 G/DL — SIGNIFICANT CHANGE UP (ref 32–36)
MCV RBC AUTO: 67.3 FL — LOW (ref 80–100)
NIGHT BLUE STAIN TISS: SIGNIFICANT CHANGE UP
NRBC # BLD: 0 /100 WBCS — SIGNIFICANT CHANGE UP (ref 0–0)
PCO2 BLDA: 30 MMHG — LOW (ref 32–46)
PH BLDA: 7.49 — HIGH (ref 7.35–7.45)
PHOSPHATE SERPL-MCNC: 3.4 MG/DL — SIGNIFICANT CHANGE UP (ref 2.5–4.5)
PLATELET # BLD AUTO: 223 K/UL — SIGNIFICANT CHANGE UP (ref 150–400)
PO2 BLDA: 68 MMHG — LOW (ref 83–108)
POTASSIUM SERPL-MCNC: 3.4 MMOL/L — LOW (ref 3.5–5.3)
POTASSIUM SERPL-SCNC: 3.4 MMOL/L — LOW (ref 3.5–5.3)
RBC # BLD: 3.39 M/UL — LOW (ref 4.2–5.8)
RBC # FLD: 21.1 % — HIGH (ref 10.3–14.5)
SAO2 % BLDA: 95.9 % — SIGNIFICANT CHANGE UP (ref 94–98)
SODIUM SERPL-SCNC: 137 MMOL/L — SIGNIFICANT CHANGE UP (ref 135–145)
SPECIMEN SOURCE: SIGNIFICANT CHANGE UP
WBC # BLD: 22.03 K/UL — HIGH (ref 3.8–10.5)
WBC # FLD AUTO: 22.03 K/UL — HIGH (ref 3.8–10.5)

## 2024-10-15 PROCEDURE — 99291 CRITICAL CARE FIRST HOUR: CPT

## 2024-10-15 RX ORDER — PROPOFOL 10 MG/ML
30.02 INJECTION, EMULSION INTRAVENOUS
Qty: 1000 | Refills: 0 | Status: DISCONTINUED | OUTPATIENT
Start: 2024-10-15 | End: 2024-10-18

## 2024-10-15 RX ORDER — SODIUM CHLORIDE 9 MG/ML
4 INJECTION, SOLUTION INTRAMUSCULAR; INTRAVENOUS; SUBCUTANEOUS EVERY 6 HOURS
Refills: 0 | Status: DISCONTINUED | OUTPATIENT
Start: 2024-10-15 | End: 2024-10-17

## 2024-10-15 RX ADMIN — IPRATROPIUM BROMIDE AND ALBUTEROL SULFATE 3 MILLILITER(S): .5; 2.5 SOLUTION RESPIRATORY (INHALATION) at 17:22

## 2024-10-15 RX ADMIN — Medication 2: at 18:05

## 2024-10-15 RX ADMIN — CHLORHEXIDINE GLUCONATE 1 APPLICATION(S): 40 SOLUTION TOPICAL at 05:11

## 2024-10-15 RX ADMIN — IPRATROPIUM BROMIDE AND ALBUTEROL SULFATE 3 MILLILITER(S): .5; 2.5 SOLUTION RESPIRATORY (INHALATION) at 05:33

## 2024-10-15 RX ADMIN — SODIUM CHLORIDE 4 MILLILITER(S): 9 INJECTION, SOLUTION INTRAMUSCULAR; INTRAVENOUS; SUBCUTANEOUS at 11:21

## 2024-10-15 RX ADMIN — PROPOFOL 8.05 MICROGRAM(S)/KG/MIN: 10 INJECTION, EMULSION INTRAVENOUS at 19:23

## 2024-10-15 RX ADMIN — HEPARIN SODIUM 5000 UNIT(S): 10000 INJECTION INTRAVENOUS; SUBCUTANEOUS at 05:11

## 2024-10-15 RX ADMIN — Medication 20 MILLIGRAM(S): at 22:20

## 2024-10-15 RX ADMIN — Medication 1 TABLET(S): at 11:37

## 2024-10-15 RX ADMIN — Medication 1 TABLET(S): at 19:24

## 2024-10-15 RX ADMIN — Medication 10 UNIT(S): at 22:21

## 2024-10-15 RX ADMIN — PIPERACILLIN AND TAZOBACTAM 25 GRAM(S): .5; 4 INJECTION, POWDER, LYOPHILIZED, FOR SOLUTION INTRAVENOUS at 10:30

## 2024-10-15 RX ADMIN — SODIUM CHLORIDE 4 MILLILITER(S): 9 INJECTION, SOLUTION INTRAMUSCULAR; INTRAVENOUS; SUBCUTANEOUS at 17:22

## 2024-10-15 RX ADMIN — HEPARIN SODIUM 5000 UNIT(S): 10000 INJECTION INTRAVENOUS; SUBCUTANEOUS at 13:31

## 2024-10-15 RX ADMIN — PANTOPRAZOLE SODIUM 40 MILLIGRAM(S): 40 TABLET, DELAYED RELEASE ORAL at 11:37

## 2024-10-15 RX ADMIN — PROPOFOL 8.05 MICROGRAM(S)/KG/MIN: 10 INJECTION, EMULSION INTRAVENOUS at 06:37

## 2024-10-15 RX ADMIN — IPRATROPIUM BROMIDE AND ALBUTEROL SULFATE 3 MILLILITER(S): .5; 2.5 SOLUTION RESPIRATORY (INHALATION) at 23:48

## 2024-10-15 RX ADMIN — Medication 1 TABLET(S): at 05:11

## 2024-10-15 RX ADMIN — NOREPINEPHRINE BITARTRATE 4.19 MICROGRAM(S)/KG/MIN: 1 INJECTION, SOLUTION, CONCENTRATE INTRAVENOUS at 19:23

## 2024-10-15 RX ADMIN — IPRATROPIUM BROMIDE AND ALBUTEROL SULFATE 3 MILLILITER(S): .5; 2.5 SOLUTION RESPIRATORY (INHALATION) at 11:21

## 2024-10-15 RX ADMIN — HEPARIN SODIUM 5000 UNIT(S): 10000 INJECTION INTRAVENOUS; SUBCUTANEOUS at 22:20

## 2024-10-15 RX ADMIN — CHLORHEXIDINE GLUCONATE 15 MILLILITER(S): 40 SOLUTION TOPICAL at 18:05

## 2024-10-15 RX ADMIN — SODIUM CHLORIDE 4 MILLILITER(S): 9 INJECTION, SOLUTION INTRAMUSCULAR; INTRAVENOUS; SUBCUTANEOUS at 23:48

## 2024-10-15 RX ADMIN — Medication 60 MILLIGRAM(S): at 15:29

## 2024-10-15 RX ADMIN — Medication 2: at 05:11

## 2024-10-15 RX ADMIN — NOREPINEPHRINE BITARTRATE 4.19 MICROGRAM(S)/KG/MIN: 1 INJECTION, SOLUTION, CONCENTRATE INTRAVENOUS at 06:06

## 2024-10-15 RX ADMIN — SODIUM CHLORIDE 4 MILLILITER(S): 9 INJECTION, SOLUTION INTRAMUSCULAR; INTRAVENOUS; SUBCUTANEOUS at 06:07

## 2024-10-15 RX ADMIN — POLYETHYLENE GLYCOL 3350 17 GRAM(S): 17 POWDER, FOR SOLUTION ORAL at 11:37

## 2024-10-15 RX ADMIN — Medication 2: at 11:36

## 2024-10-15 RX ADMIN — Medication 300 MILLIGRAM(S): at 11:37

## 2024-10-15 RX ADMIN — PROPOFOL 8.05 MICROGRAM(S)/KG/MIN: 10 INJECTION, EMULSION INTRAVENOUS at 18:05

## 2024-10-15 RX ADMIN — CHLORHEXIDINE GLUCONATE 15 MILLILITER(S): 40 SOLUTION TOPICAL at 05:11

## 2024-10-15 NOTE — PROGRESS NOTE ADULT - ASSESSMENT
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    Neuro: Patient currently sedated with propofol.  Maintain sedation with vent synchrony.    Cardiovascular: Patient in distributive shock secondary to sedation from propofol. Maintain a MAP goal greater than 65.  Respiratory: Patient with acute hypercapnic and hypoxic respiratory failure secondary to aspiration.  Patient with recurrent aspirations following extubation and required reintubation today.  Bronchoscopy performed at the bedside with therapeutic aspiration performed of the right and left lung, copious thick mucus found from both sides of the lung.. Continue with airway clearance.  Titrate ventilator settings based on arterial blood gas.  Following discussion with the patient's wife will plan for palliative extubation following arrival the rest of family  GI: Continue with tube feeds via PEG tube  Renal: Patient with ESRD. No longer wish to continue HD   ID: Can continue with Zosyn and Bactrim for treatment of Pseudomonas and stenotrophomonas actively.    Endo: Fingersticks every 6 hours and insulin sliding scale.  Increase Lantus to 10 units nightly  Heme: SCDs for DVT prophylaxis will resume heparin subcu.  Ethics: Patient currently full code but will be transition to DNR/DNI following of the ventilator and transition to comfort care.

## 2024-10-15 NOTE — PROGRESS NOTE ADULT - SUBJECTIVE AND OBJECTIVE BOX
[facility] NEPHROLOGY SERVICES, [facility]  NEPHROLOGY AND HYPERTENSION  [address]  [address]  [place] [address]  [phone]    MD RONNA CHANG MD YELENA ROSENBERG, MD BINNY KOSHY, MD CHRISTOPHER CAPUTO, MD EDWARD BOVER, MD          Patient events noted    MEDICATIONS  (STANDING):  albuterol/ipratropium for Nebulization 3 milliLiter(s) Nebulizer every 6 hours  atorvastatin 20 milliGRAM(s) Oral at bedtime  chlorhexidine 0.12% Liquid 15 milliLiter(s) Oral Mucosa every 12 hours  chlorhexidine 2% Cloths 1 Application(s) Topical <User Schedule>  diltiazem    Tablet 60 milliGRAM(s) Oral three times a day  ferrous    sulfate Liquid 300 milliGRAM(s) Enteral Tube daily  heparin   Injectable 5000 Unit(s) SubCutaneous every 8 hours  insulin glargine Injectable (LANTUS) 10 Unit(s) SubCutaneous at bedtime  insulin lispro (ADMELOG) corrective regimen sliding scale   SubCutaneous every 6 hours  Nephro-noam 1 Tablet(s) Oral daily  norepinephrine Infusion 0.05 MICROgram(s)/kG/Min (4.19 mL/Hr) IV Continuous <Continuous>  pantoprazole   Suspension 40 milliGRAM(s) Enteral Tube daily  polyethylene glycol 3350 17 Gram(s) Oral daily  propofol Infusion 30 MICROgram(s)/kG/Min (8.05 mL/Hr) IV Continuous <Continuous>  senna 2 Tablet(s) Oral at bedtime  sodium chloride 3%  Inhalation 4 milliLiter(s) Inhalation every 6 hours  trimethoprim  160 mG/sulfamethoxazole 800 mG 1 Tablet(s) Oral every 12 hours    MEDICATIONS  (PRN):      10-14-24 @ 07:01  -  10-15-24 @ 07:00  --------------------------------------------------------  IN: 692.2 mL / OUT: 0 mL / NET: 692.2 mL    10-15-24 @ 07:01  -  10-15-24 @ 17:52  --------------------------------------------------------  IN: 222.3 mL / OUT: 0 mL / NET: 222.3 mL      PHYSICAL EXAM:      T(C): 37.5 (10-15-24 @ 17:00), Max: 38.3 (10-15-24 @ 05:30)  HR: 78 (10-15-24 @ 17:00) (75 - 97)  BP: --  RR: 22 (10-15-24 @ 17:00) (15 - 28)  SpO2: 93% (10-15-24 @ 17:00) (91% - 100%)  Wt(kg): --  Lungs clear  Heart S1S2  Abd soft NT ND  Extremities:   tr edema                                    8.0    22.03 )-----------( 223      ( 15 Oct 2024 02:00 )             22.8     10-15    137  |  99  |  66[H]  ----------------------------<  157[H]  3.4[L]   |  21[L]  |  4.66[H]    Ca    9.5      15 Oct 2024 02:00  Phos  3.4     10-15  Mg     2.4     10-15    TPro  6.4  /  Alb  1.9[L]  /  TBili  0.4  /  DBili  x   /  AST  11[L]  /  ALT  15  /  AlkPhos  112  10-14    ABG - ( 15 Oct 2024 08:53 )  pH, Arterial: 7.49  pH, Blood: x     /  pCO2: 30    /  pO2: 68    / HCO3: 23    / Base Excess: 0.4   /  SaO2: 95.9              LIVER FUNCTIONS - ( 14 Oct 2024 03:05 )  Alb: 1.9 g/dL / Pro: 6.4 gm/dL / ALK PHOS: 112 U/L / ALT: 15 U/L / AST: 11 U/L / GGT: x           Creatinine Trend: 4.66<--, 3.34<--, 2.44<--, 3.12<--, 3.69<--, 2.32<--  Mode: AC/ CMV (Assist Control/ Continuous Mandatory Ventilation)  RR (machine): 16  TV (machine): 400  FiO2: 90  PEEP: 12  ITime: 1  MAP: 19  PIP: 36        Assessment   ESRD, chronic aspiration, respiratory failure     Plan:  Family has decided on comfort care only.  No further HD    Delonte Moya MD

## 2024-10-15 NOTE — PROGRESS NOTE ADULT - SUBJECTIVE AND OBJECTIVE BOX
Patient is a 73y old  Male who presents with a chief complaint of Sepsis and acute hypoxic respiratory failure secondary to suspected aspiration PNA vs. HCAP (15 Oct 2024 17:51)      BRIEF HOSPITAL COURSE:   72 yo m pmhx HTN, DM2, ESRD on HD (MWF, RUE AV Fistula), CVA with residual R sided weakness and dsyphagia s/p peg, s/p trach since decannulated, RLE DVT (completed course of Eliquis) admitted with RLL PNA with course c/b acute hypoxic respiratory failure ultimately requiring intubation, failed trial of extubation and was subsequently reintubated 10/14, distributive shock.     Events last 24 hours:   received on fio2 90% and peep 12.  spo2 maintaining, peep weaned to 8.  will wean fio2 as tolerated overnight.  levophed for MAP >65      PAST MEDICAL & SURGICAL HISTORY:  ESRD on hemodialysis  HTN (hypertension)  Insulin dependent type 2 diabetes mellitus  History of cerebrovascular accident (CVA) with residual deficit  History of DVT of lower extremity  Arteriovenous fistula  S/P percutaneous endoscopic gastrostomy (PEG) tube placement  History of tracheostomy      Allergies  No Known Allergies      FAMILY HISTORY:  FHx: diabetes mellitus (Father, Aunt)      Social History:   from home      Review of Systems:  unable to obtain 2/2 intubated/sedated      Physical Examination:    General: elderly male, lying in bed, nad    HEENT: nc/at, ett in place    PULM: coarse b/l    CVS: rrr    ABD: Soft, nondistended, nontender, +bs    EXT: No edema, nontender    SKIN: Warm    NEURO: sedated      Medications:  trimethoprim  160 mG/sulfamethoxazole 800 mG 1 Tablet(s) Oral every 12 hours  diltiazem    Tablet 60 milliGRAM(s) Oral three times a day  norepinephrine Infusion 0.05 MICROgram(s)/kG/Min IV Continuous <Continuous>  albuterol/ipratropium for Nebulization 3 milliLiter(s) Nebulizer every 6 hours  sodium chloride 3%  Inhalation 4 milliLiter(s) Inhalation every 6 hours  propofol Infusion 30 MICROgram(s)/kG/Min IV Continuous <Continuous>  heparin   Injectable 5000 Unit(s) SubCutaneous every 8 hours  pantoprazole   Suspension 40 milliGRAM(s) Enteral Tube daily  polyethylene glycol 3350 17 Gram(s) Oral daily  senna 2 Tablet(s) Oral at bedtime  atorvastatin 20 milliGRAM(s) Oral at bedtime  insulin glargine Injectable (LANTUS) 10 Unit(s) SubCutaneous at bedtime  insulin lispro (ADMELOG) corrective regimen sliding scale   SubCutaneous every 6 hours  ferrous    sulfate Liquid 300 milliGRAM(s) Enteral Tube daily  Nephro-noam 1 Tablet(s) Oral daily  chlorhexidine 0.12% Liquid 15 milliLiter(s) Oral Mucosa every 12 hours  chlorhexidine 2% Cloths 1 Application(s) Topical <User Schedule>      Mode: AC/ CMV (Assist Control/ Continuous Mandatory Ventilation)  RR (machine): 16  TV (machine): 400  FiO2: 70  PEEP: 8  ITime: 1  MAP: 15  PIP: 36      ICU Vital Signs Last 24 Hrs  T(C): 37.8 (15 Oct 2024 22:51), Max: 38.3 (15 Oct 2024 05:30)  T(F): 100 (15 Oct 2024 22:51), Max: 100.9 (15 Oct 2024 05:30)  HR: 75 (15 Oct 2024 22:51) (74 - 97)  BP: --  BP(mean): --  ABP: 139/55 (15 Oct 2024 22:51) (95/39 - 181/56)  ABP(mean): 82 (15 Oct 2024 22:51) (57 - 100)  RR: 25 (15 Oct 2024 22:51) (15 - 28)  SpO2: 92% (15 Oct 2024 22:51) (90% - 100%)    O2 Parameters below as of 15 Oct 2024 21:30    O2 Concentration (%): 70    Vital Signs Last 24 Hrs  T(C): 37.8 (15 Oct 2024 22:51), Max: 38.3 (15 Oct 2024 05:30)  T(F): 100 (15 Oct 2024 22:51), Max: 100.9 (15 Oct 2024 05:30)  HR: 75 (15 Oct 2024 22:51) (74 - 97)  BP: --  BP(mean): --  RR: 25 (15 Oct 2024 22:51) (15 - 28)  SpO2: 92% (15 Oct 2024 22:51) (90% - 100%)    Parameters below as of 15 Oct 2024 21:30    O2 Concentration (%): 70    ABG - ( 15 Oct 2024 08:53 )  pH, Arterial: 7.49  pH, Blood: x     /  pCO2: 30    /  pO2: 68    / HCO3: 23    / Base Excess: 0.4   /  SaO2: 95.9        I&O's Detail    14 Oct 2024 07:01  -  15 Oct 2024 07:00  --------------------------------------------------------  IN:    IV PiggyBack: 200 mL    Nepro with Carb Steady: 170 mL    Norepinephrine: 173.4 mL    Propofol: 148.8 mL  Total IN: 692.2 mL    OUT:  Total OUT: 0 mL  Total NET: 692.2 mL      15 Oct 2024 07:01  -  15 Oct 2024 23:32  --------------------------------------------------------  IN:    IV PiggyBack: 100 mL    Nepro with Carb Steady: 40 mL    Norepinephrine: 114.9 mL    Propofol: 96 mL  Total IN: 350.9 mL    OUT:  Total OUT: 0 mL  Total NET: 350.9 mL      LABS:                        8.0    22.03 )-----------( 223      ( 15 Oct 2024 02:00 )             22.8     10-15    137  |  99  |  66[H]  ----------------------------<  157[H]  3.4[L]   |  21[L]  |  4.66[H]    Ca    9.5      15 Oct 2024 02:00  Phos  3.4     10-15  Mg     2.4     10-15    TPro  6.4  /  Alb  1.9[L]  /  TBili  0.4  /  DBili  x   /  AST  11[L]  /  ALT  15  /  AlkPhos  112  10-14      CAPILLARY BLOOD GLUCOSE  POCT Blood Glucose.: 117 mg/dL (15 Oct 2024 22:02)      Urinalysis Basic - ( 15 Oct 2024 02:00 )  Color: x / Appearance: x / SG: x / pH: x  Gluc: 157 mg/dL / Ketone: x  / Bili: x / Urobili: x   Blood: x / Protein: x / Nitrite: x   Leuk Esterase: x / RBC: x / WBC x   Sq Epi: x / Non Sq Epi: x / Bacteria: x      CULTURES:  pending      RADIOLOGY:   < from: Xray Chest 1 View- PORTABLE-Urgent (Xray Chest 1 View- PORTABLE-Urgent .) (10.14.24 @ 11:36) >    ACC: [id number] EXAM:  XR CHEST PORTABLE URGENT 1V   ORDERED BY: SEPIDEH REED     PROCEDURE DATE:  10/14/2024      INTERPRETATION:  AP chest on October 14, 2024 at 10:10 AM. Patient was   intubated.    Heart normal for projection.    Thereis a persistent retrocardiac infiltrate and right base infiltrate.   Right base infiltrate may be slightly better aerated than on October 13.    Endotracheal tube insertion.    NG tube is noted.    IMPRESSION: Persistent bibasilar infiltrates showingslightly better   aeration at the right base. Intubation.    --- End of Report ---    ITALIA ALY MD  This document has been electronically signed. Oct 14 2024  2:40PM    < end of copied text >

## 2024-10-15 NOTE — PROGRESS NOTE ADULT - SUBJECTIVE AND OBJECTIVE BOX
CHIEF COMPLAINT: Acute hypoxic respiratory failure    Interval Events: No acute events overnight     REVIEW OF SYSTEMS:  Unable to assess due to sedation         OBJECTIVE:  ICU Vital Signs Last 24 Hrs  T(C): 37.6 (15 Oct 2024 13:00), Max: 38.3 (15 Oct 2024 05:30)  T(F): 99.7 (15 Oct 2024 13:00), Max: 100.9 (15 Oct 2024 05:30)  HR: 82 (15 Oct 2024 13:00) (75 - 97)  BP: --  BP(mean): --  ABP: 134/46 (15 Oct 2024 13:00) (95/39 - 181/56)  ABP(mean): 74 (15 Oct 2024 13:00) (57 - 100)  RR: 24 (15 Oct 2024 13:00) (15 - 28)  SpO2: 94% (15 Oct 2024 13:00) (91% - 100%)    O2 Parameters below as of 14 Oct 2024 22:06      O2 Concentration (%): 90      Mode: AC/ CMV (Assist Control/ Continuous Mandatory Ventilation), RR (machine): 16, TV (machine): 400, FiO2: 90, PEEP: 12, ITime: 0.9, MAP: 16, PIP: 28    10-14 @ 07:01  -  10-15 @ 07:00  --------------------------------------------------------  IN: 692.2 mL / OUT: 0 mL / NET: 692.2 mL    10-15 @ 07:01  -  10-15 @ 14:14  --------------------------------------------------------  IN: 100 mL / OUT: 0 mL / NET: 100 mL      CAPILLARY BLOOD GLUCOSE      POCT Blood Glucose.: 168 mg/dL (15 Oct 2024 11:06)      PHYSICAL EXAM:  GENERAL: NAD, well-groomed, well-developed  EYES: EOMI, PERRLA, conjunctiva and sclera clear  ENMT: ET tube in place   NECK: Supple, No JVD, Normal thyroid  CHEST/LUNG: Clear to auscultation bilaterally; No rales, rhonchi, wheezing, or rubs  HEART: Regular rate and rhythm; No murmurs, rubs, or gallops  ABDOMEN: Soft, Nontender, Nondistended; Bowel sounds present  VASCULAR:  2+ Peripheral Pulses, No clubbing, cyanosis, or edema  LYMPH: No lymphadenopathy noted  SKIN: No rashes or lesions  NERVOUS SYSTEM:  Sedated    LINES:    HOSPITAL MEDICATIONS:  heparin   Injectable 5000 Unit(s) SubCutaneous every 8 hours    trimethoprim  160 mG/sulfamethoxazole 800 mG 1 Tablet(s) Oral every 12 hours    diltiazem    Tablet 60 milliGRAM(s) Oral three times a day  norepinephrine Infusion 0.05 MICROgram(s)/kG/Min IV Continuous <Continuous>    atorvastatin 20 milliGRAM(s) Oral at bedtime  insulin glargine Injectable (LANTUS) 10 Unit(s) SubCutaneous at bedtime  insulin lispro (ADMELOG) corrective regimen sliding scale   SubCutaneous every 6 hours    albuterol/ipratropium for Nebulization 3 milliLiter(s) Nebulizer every 6 hours  sodium chloride 3%  Inhalation 4 milliLiter(s) Inhalation every 6 hours    propofol Infusion 30 MICROgram(s)/kG/Min IV Continuous <Continuous>    pantoprazole   Suspension 40 milliGRAM(s) Enteral Tube daily  polyethylene glycol 3350 17 Gram(s) Oral daily  senna 2 Tablet(s) Oral at bedtime        ferrous    sulfate Liquid 300 milliGRAM(s) Enteral Tube daily  Nephro-noam 1 Tablet(s) Oral daily      chlorhexidine 0.12% Liquid 15 milliLiter(s) Oral Mucosa every 12 hours  chlorhexidine 2% Cloths 1 Application(s) Topical <User Schedule>        LABS:                        8.0    22.03 )-----------( 223      ( 15 Oct 2024 02:00 )             22.8     Hgb Trend: 8.0<--, 8.2<--, 9.0<--, 9.0<--, 9.3<--  10-15    137  |  99  |  66[H]  ----------------------------<  157[H]  3.4[L]   |  21[L]  |  4.66[H]    Ca    9.5      15 Oct 2024 02:00  Phos  3.4     10-15  Mg     2.4     10-15    TPro  6.4  /  Alb  1.9[L]  /  TBili  0.4  /  DBili  x   /  AST  11[L]  /  ALT  15  /  AlkPhos  112  10-14    Creatinine Trend: 4.66<--, 3.34<--, 2.44<--, 3.12<--, 3.69<--, 2.32<--    Urinalysis Basic - ( 15 Oct 2024 02:00 )    Color: x / Appearance: x / SG: x / pH: x  Gluc: 157 mg/dL / Ketone: x  / Bili: x / Urobili: x   Blood: x / Protein: x / Nitrite: x   Leuk Esterase: x / RBC: x / WBC x   Sq Epi: x / Non Sq Epi: x / Bacteria: x      Arterial Blood Gas:  10-15 @ 08:53  7.49/30/68/23/95.9/0.4  ABG lactate: --  Arterial Blood Gas:  10-14 @ 21:12  7.51/29/297/23/100.0/1.0  ABG lactate: --  Arterial Blood Gas:  10-14 @ 10:37  7.41/35/76/22/97.2/-2.1  ABG lactate: --  Arterial Blood Gas:  10-13 @ 22:28  7.39/40/77/24/97.5/-0.7  ABG lactate: --        MICROBIOLOGY:     RADIOLOGY:  [ ] Reviewed and interpreted by me    EKG:

## 2024-10-15 NOTE — PROGRESS NOTE ADULT - ASSESSMENT
72 yo m pmhx HTN, DM2, ESRD on HD (MWF, RUE AV Fistula), CVA with residual R sided weakness and dsyphagia s/p peg, s/p trach since decannulated, RLE DVT (completed course of Eliquis) admitted with RLL PNA with course c/b acute hypoxic respiratory failure ultimately requiring intubation, failed trial of extubation and was subsequently reintubated 10/14, distributive shock.     NEURO: sedated on propofol  CV: distributive shock requiring vasopressor therapy, actively titrating levophed for map >65  RESP: ac/vc 4-6 cc/kg tv lung protective strategies, actively titrating fio2/peep for spo2 >92%, weaning settings as tolerated.  inh bronchodilators/3% saline for secretions  RENAL: ESRD last session 10/12 -2500, nephrology following  GI: NPO with tf  ENDO: Lantus/ISS for glycemic control   ID: bactrim for steno in sputum  HEME: heparin for vte ppx   DISPO: Full code, patient with frequent hospitalizations over the last year, on going goc with patient's family    Critical Care time: 42 mins assessing presenting problems of acute illness that poses high probability of life threatening deterioration or end organ damage/dysfunction.  Medical decision making including Initiating plan of care, reviewing data, reviewing radiology, discussing with multidisciplinary team, non inclusive of procedures, discussing goals of care with patient/family    DATE OF DOCUMENTATION EQUIVALENT TO DATE OF SERVICES RENDERED

## 2024-10-16 DIAGNOSIS — E43 UNSPECIFIED SEVERE PROTEIN-CALORIE MALNUTRITION: ICD-10-CM

## 2024-10-16 DIAGNOSIS — Z51.5 ENCOUNTER FOR PALLIATIVE CARE: ICD-10-CM

## 2024-10-16 LAB
-  AZTREONAM: SIGNIFICANT CHANGE UP
-  CEFEPIME: SIGNIFICANT CHANGE UP
-  CEFTAZIDIME: SIGNIFICANT CHANGE UP
-  CIPROFLOXACIN: SIGNIFICANT CHANGE UP
-  IMIPENEM: SIGNIFICANT CHANGE UP
-  LEVOFLOXACIN: SIGNIFICANT CHANGE UP
-  MEROPENEM: SIGNIFICANT CHANGE UP
-  PIPERACILLIN/TAZOBACTAM: SIGNIFICANT CHANGE UP
CULTURE RESULTS: ABNORMAL
GLUCOSE BLDC GLUCOMTR-MCNC: 100 MG/DL — HIGH (ref 70–99)
GLUCOSE BLDC GLUCOMTR-MCNC: 110 MG/DL — HIGH (ref 70–99)
GLUCOSE BLDC GLUCOMTR-MCNC: 116 MG/DL — HIGH (ref 70–99)
GLUCOSE BLDC GLUCOMTR-MCNC: 83 MG/DL — SIGNIFICANT CHANGE UP (ref 70–99)
METHOD TYPE: SIGNIFICANT CHANGE UP
ORGANISM # SPEC MICROSCOPIC CNT: ABNORMAL
ORGANISM # SPEC MICROSCOPIC CNT: SIGNIFICANT CHANGE UP

## 2024-10-16 PROCEDURE — 99223 1ST HOSP IP/OBS HIGH 75: CPT

## 2024-10-16 PROCEDURE — 99291 CRITICAL CARE FIRST HOUR: CPT

## 2024-10-16 RX ORDER — MIDODRINE HYDROCHLORIDE 2.5 MG/1
10 TABLET ORAL EVERY 8 HOURS
Refills: 0 | Status: DISCONTINUED | OUTPATIENT
Start: 2024-10-16 | End: 2024-10-22

## 2024-10-16 RX ADMIN — Medication 20 MILLIGRAM(S): at 21:54

## 2024-10-16 RX ADMIN — HEPARIN SODIUM 5000 UNIT(S): 10000 INJECTION INTRAVENOUS; SUBCUTANEOUS at 13:26

## 2024-10-16 RX ADMIN — PROPOFOL 8.05 MICROGRAM(S)/KG/MIN: 10 INJECTION, EMULSION INTRAVENOUS at 10:28

## 2024-10-16 RX ADMIN — SODIUM CHLORIDE 4 MILLILITER(S): 9 INJECTION, SOLUTION INTRAMUSCULAR; INTRAVENOUS; SUBCUTANEOUS at 05:09

## 2024-10-16 RX ADMIN — PANTOPRAZOLE SODIUM 40 MILLIGRAM(S): 40 TABLET, DELAYED RELEASE ORAL at 11:14

## 2024-10-16 RX ADMIN — MIDODRINE HYDROCHLORIDE 10 MILLIGRAM(S): 2.5 TABLET ORAL at 21:54

## 2024-10-16 RX ADMIN — HEPARIN SODIUM 5000 UNIT(S): 10000 INJECTION INTRAVENOUS; SUBCUTANEOUS at 21:54

## 2024-10-16 RX ADMIN — SODIUM CHLORIDE 4 MILLILITER(S): 9 INJECTION, SOLUTION INTRAMUSCULAR; INTRAVENOUS; SUBCUTANEOUS at 17:33

## 2024-10-16 RX ADMIN — PROPOFOL 8.05 MICROGRAM(S)/KG/MIN: 10 INJECTION, EMULSION INTRAVENOUS at 21:56

## 2024-10-16 RX ADMIN — Medication 2 TABLET(S): at 21:54

## 2024-10-16 RX ADMIN — CHLORHEXIDINE GLUCONATE 1 APPLICATION(S): 40 SOLUTION TOPICAL at 06:38

## 2024-10-16 RX ADMIN — IPRATROPIUM BROMIDE AND ALBUTEROL SULFATE 3 MILLILITER(S): .5; 2.5 SOLUTION RESPIRATORY (INHALATION) at 17:33

## 2024-10-16 RX ADMIN — Medication 300 MILLIGRAM(S): at 11:13

## 2024-10-16 RX ADMIN — PROPOFOL 8.05 MICROGRAM(S)/KG/MIN: 10 INJECTION, EMULSION INTRAVENOUS at 03:45

## 2024-10-16 RX ADMIN — PROPOFOL 8.05 MICROGRAM(S)/KG/MIN: 10 INJECTION, EMULSION INTRAVENOUS at 00:14

## 2024-10-16 RX ADMIN — SODIUM CHLORIDE 4 MILLILITER(S): 9 INJECTION, SOLUTION INTRAMUSCULAR; INTRAVENOUS; SUBCUTANEOUS at 12:11

## 2024-10-16 RX ADMIN — IPRATROPIUM BROMIDE AND ALBUTEROL SULFATE 3 MILLILITER(S): .5; 2.5 SOLUTION RESPIRATORY (INHALATION) at 12:00

## 2024-10-16 RX ADMIN — CHLORHEXIDINE GLUCONATE 15 MILLILITER(S): 40 SOLUTION TOPICAL at 06:38

## 2024-10-16 RX ADMIN — HEPARIN SODIUM 5000 UNIT(S): 10000 INJECTION INTRAVENOUS; SUBCUTANEOUS at 06:38

## 2024-10-16 RX ADMIN — IPRATROPIUM BROMIDE AND ALBUTEROL SULFATE 3 MILLILITER(S): .5; 2.5 SOLUTION RESPIRATORY (INHALATION) at 05:09

## 2024-10-16 RX ADMIN — Medication 1 TABLET(S): at 06:38

## 2024-10-16 RX ADMIN — Medication 1 TABLET(S): at 11:12

## 2024-10-16 RX ADMIN — CHLORHEXIDINE GLUCONATE 15 MILLILITER(S): 40 SOLUTION TOPICAL at 18:09

## 2024-10-16 NOTE — PROGRESS NOTE ADULT - SUBJECTIVE AND OBJECTIVE BOX
Date of entry of this note is equal to the date of services rendered     Patient is a 73y old  Male who presents with a chief complaint of Sepsis and acute hypoxic respiratory failure secondary to suspected aspiration PNA vs. HCAP (16 Oct 2024 16:31)      BRIEF HOSPITAL COURSE:   Pt is a 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    Extubated 10/10  Reintubated 10/14 and repeat bronch      Events last 24 hours:   Remains vented  on levophed increasing  add midodrine to wean  60% peep of 8    Social history:    PAST MEDICAL & SURGICAL HISTORY:  ESRD on hemodialysis      HTN (hypertension)      Insulin dependent type 2 diabetes mellitus      History of cerebrovascular accident (CVA) with residual deficit      History of DVT of lower extremity      Arteriovenous fistula      S/P percutaneous endoscopic gastrostomy (PEG) tube placement      History of tracheostomy        Allergies    No Known Allergies    Intolerances      FAMILY HISTORY:  FHx: diabetes mellitus (Father, Aunt)        Review of Systems:  pt intubated     Medications:    midodrine 10 milliGRAM(s) Oral every 8 hours  norepinephrine Infusion 0.05 MICROgram(s)/kG/Min IV Continuous <Continuous>    albuterol/ipratropium for Nebulization 3 milliLiter(s) Nebulizer every 6 hours  sodium chloride 3%  Inhalation 4 milliLiter(s) Inhalation every 6 hours    propofol Infusion 30.015 MICROgram(s)/kG/Min IV Continuous <Continuous>      heparin   Injectable 5000 Unit(s) SubCutaneous every 8 hours    pantoprazole   Suspension 40 milliGRAM(s) Enteral Tube daily  polyethylene glycol 3350 17 Gram(s) Oral daily  senna 2 Tablet(s) Oral at bedtime      atorvastatin 20 milliGRAM(s) Oral at bedtime  insulin lispro (ADMELOG) corrective regimen sliding scale   SubCutaneous every 6 hours    ferrous    sulfate Liquid 300 milliGRAM(s) Enteral Tube daily  Nephro-noam 1 Tablet(s) Oral daily      chlorhexidine 0.12% Liquid 15 milliLiter(s) Oral Mucosa every 12 hours  chlorhexidine 2% Cloths 1 Application(s) Topical <User Schedule>        Mode: AC/ CMV (Assist Control/ Continuous Mandatory Ventilation)  RR (machine): 16  TV (machine): 400  FiO2: 60  PEEP: 8  ITime: 1  MAP: 15  PIP: 32      ICU Vital Signs Last 24 Hrs  T(C): 38.2 (16 Oct 2024 22:00), Max: 38.2 (16 Oct 2024 20:30)  T(F): 100.8 (16 Oct 2024 22:00), Max: 100.8 (16 Oct 2024 20:30)  HR: 83 (16 Oct 2024 22:00) (70 - 90)  BP: --  BP(mean): --  ABP: 134/48 (16 Oct 2024 22:00) (81/42 - 180/65)  ABP(mean): 74 (16 Oct 2024 22:00) (55 - 104)  RR: 25 (16 Oct 2024 22:00) (19 - 31)  SpO2: 91% (16 Oct 2024 22:00) (88% - 100%)    O2 Parameters below as of 16 Oct 2024 01:00  Patient On (Oxygen Delivery Method): ventilator, /RATE16/PEEP8    O2 Concentration (%): 80      Vital Signs Last 24 Hrs  T(C): 38.2 (16 Oct 2024 22:00), Max: 38.2 (16 Oct 2024 20:30)  T(F): 100.8 (16 Oct 2024 22:00), Max: 100.8 (16 Oct 2024 20:30)  HR: 83 (16 Oct 2024 22:00) (70 - 90)  BP: --  BP(mean): --  RR: 25 (16 Oct 2024 22:00) (19 - 31)  SpO2: 91% (16 Oct 2024 22:00) (88% - 100%)    Parameters below as of 16 Oct 2024 01:00  Patient On (Oxygen Delivery Method): ventilator, /RATE16/PEEP8    O2 Concentration (%): 80    ABG - ( 15 Oct 2024 08:53 )  pH, Arterial: 7.49  pH, Blood: x     /  pCO2: 30    /  pO2: 68    / HCO3: 23    / Base Excess: 0.4   /  SaO2: 95.9                I&O's Detail    15 Oct 2024 07:01  -  16 Oct 2024 07:00  --------------------------------------------------------  IN:    Enteral Tube Flush: 140 mL    IV PiggyBack: 100 mL    Nepro with Carb Steady: 130 mL    Norepinephrine: 168.7 mL    Propofol: 104 mL    Propofol: 64 mL  Total IN: 706.7 mL    OUT:  Total OUT: 0 mL    Total NET: 706.7 mL      16 Oct 2024 07:01  -  16 Oct 2024 22:55  --------------------------------------------------------  IN:    IV PiggyBack: 50 mL    Nepro with Carb Steady: 70 mL    Norepinephrine: 82.1 mL    Propofol: 112 mL  Total IN: 314.1 mL    OUT:    Voided (mL): 0 mL  Total OUT: 0 mL    Total NET: 314.1 mL            LABS:                        8.0    22.03 )-----------( 223      ( 15 Oct 2024 02:00 )             22.8     10-15    137  |  99  |  66[H]  ----------------------------<  157[H]  3.4[L]   |  21[L]  |  4.66[H]    Ca    9.5      15 Oct 2024 02:00  Phos  3.4     10-15  Mg     2.4     10-15            CAPILLARY BLOOD GLUCOSE      POCT Blood Glucose.: 100 mg/dL (16 Oct 2024 17:21)      Urinalysis Basic - ( 15 Oct 2024 02:00 )    Color: x / Appearance: x / SG: x / pH: x  Gluc: 157 mg/dL / Ketone: x  / Bili: x / Urobili: x   Blood: x / Protein: x / Nitrite: x   Leuk Esterase: x / RBC: x / WBC x   Sq Epi: x / Non Sq Epi: x / Bacteria: x      CULTURES:  Culture Results:   Few Pseudomonas aeruginosa  Commensal dominick consistent with body site (10-14 @ 11:20)      Physical Examination:    General: elderly male in bed, intubated     HEENT: Pupils equal, reactive to light.  Symmetric.    PULM: b/l air entry     CVS: +s1/s2    ABD: Soft      SKIN: Warm    Neuro: sedated     RADIOLOGY:   < from: Xray Chest 1 View- PORTABLE-Urgent (Xray Chest 1 View- PORTABLE-Urgent .) (10.14.24 @ 11:36) >  INTERPRETATION:  AP chest on October 14, 2024 at 10:10 AM. Patient was   intubated.    Heart normal for projection.    Thereis a persistent retrocardiac infiltrate and right base infiltrate.   Right base infiltrate may be slightly better aerated than on October 13.    Endotracheal tube insertion.    NG tube is noted.    IMPRESSION: Persistent bibasilar infiltrates showingslightly better   aeration at the right base. Intubation.    < end of copied text >      Critical Care time: 35 mins assessing presenting problems of acute illness that poses high probability of life threatening deterioration or end organ damage/dysfunction.  Medical decision making inculding Initiating plan of care, reviewing data, reviewing radiology,direct patient bedside evaluation and interpretation of vital signs, any necessary ventilator management , discusion with multidisciplinary team, discussing goals of care with patient/family, all non inclusive of procedures

## 2024-10-16 NOTE — CONSULT NOTE ADULT - PROBLEM SELECTOR RECOMMENDATION 7
Patient's son, Gavin at bedside.   Introduced self and palliative as well as our role in the care team.  Patient has wife and 5 children, allo of whom are involved.  Provided contact info and suggested a family meeting to help with complex decision making.  Palliative to follow.     Message sent to ALMA Emmanuel Patient's son, Gavin at bedside.   Introduced self and palliative as well as our role in the care team.  Patient has wife and 5 children, all of whom are involved.  Provided contact info and asked for patient's wife to call me when she arrives and suggested a family meeting to help with complex decision making.  Palliative to follow.     Message sent to ALMA Emmanuel

## 2024-10-16 NOTE — CONSULT NOTE ADULT - ASSESSMENT
73 year old male PMH of CVA (wheel chair bound and dependent with ADLs at baseline) and with PEG, ESRD on HD, h/o intubation requiring trach later decannulated, and DVT presented to the ED for cough and chills.  Patient with multiple hospitalizations this year.  Hospital course complicated by multiple RRTs with escalation to ICU with intubation 2 times with likely aspiration and hypoxemia.  Patient sedate and ventilated and intermittently requiring pressors support.  Palliative consulted for GOC.

## 2024-10-16 NOTE — CONSULT NOTE ADULT - PROBLEM SELECTOR RECOMMENDATION 6
Clinical evidence indicates that the patient has Severe protein calorie malnutrition/ 3rd degree    In context of  Chronic Illness (>1 month)    Energy/Food intake <50% of estimated energy requirement >5 days  Weight loss: Moderate - severe (lbs lost recently)  Body Fat loss: Severe   (Cachexia, temporal wasting, muscle atrophy)  Fluid Accumulation: Severe (Fluid overload, ascites, pleural effusions)   Strength: severe (bedbound)    Recommend:   tube feeds as tolerated, HOBE and oral hygiene

## 2024-10-16 NOTE — CONSULT NOTE ADULT - PROBLEM SELECTOR RECOMMENDATION 9
sputum with pseudomonas and later Stenotrophomonas on abx  pressor use, was stopped this morning, BP labile  PEG tube with concern for aspirations

## 2024-10-16 NOTE — PHARMACOTHERAPY INTERVENTION NOTE - COMMENTS
Recommended levofloxacin IV dose of 750 mg once, followed by 500 mg q48h for treatment of pseudomonas & stenotrophomonas pneumonia

## 2024-10-16 NOTE — CONSULT NOTE ADULT - PROBLEM SELECTOR PROBLEM 6
Outpatient Physical Therapy  DAILY TREATMENT     [facility]  [address]  [place] [address]  Phone:  [phone]  Fax:  [phone]    Date: 04/26/2021    Patient: Han Pat  YOB: 1935  MRN: [id number]     Time Calculation    Start time: 1351  Stop time: 1421 Time Calculation (min): 30 minutes         Chief Complaint: Difficulty Walking and Loss Of Balance    Visit #: 6     Pt late to appt today    SUBJECTIVE:  I've been able to walk more easily. I can walk 220 yards up and down the street.     Sally, wife, reporting noticing pt is walking better.  Additionally reporting improvements of descending stairs and walking on unlevel ground.    Pt present with wife      OBJECTIVE:  See progress note      Therapeutic Exercises (CPT 29106):     1. Review of hep    Therapeutic Treatments and Modalities:     1. Neuromuscular Re-education (CPT 55454), see below    Therapeutic Treatment and Modalities Summary: Sit to stand testing  Escobar testing    Time-based treatments/modalities:      ASSESSMENT:   See progress note      PLAN/RECOMMENDATIONS:   Plan for treatment: therapy treatment to continue next visit.  Planned interventions for next visit: continue with current treatment, gait training (CPT 56884), neuromuscular re-education (CPT 60469) and therapeutic exercise (CPT 08681).  See progress note      Severe protein-calorie malnutrition

## 2024-10-16 NOTE — PROGRESS NOTE ADULT - SUBJECTIVE AND OBJECTIVE BOX
[facility] NEPHROLOGY SERVICES, [facility]  NEPHROLOGY AND HYPERTENSION  [address]  [address]  [place] [address]  [phone]    MD RONNA CHANG MD YELENA ROSENBERG, MD BINNY KOSHY, MD CHRISTOPHER CAPUTO, MD EDWARD BOVER, MD          Patient events noted    Vent dependent  Family at bedside    MEDICATIONS  (STANDING):  albuterol/ipratropium for Nebulization 3 milliLiter(s) Nebulizer every 6 hours  atorvastatin 20 milliGRAM(s) Oral at bedtime  chlorhexidine 0.12% Liquid 15 milliLiter(s) Oral Mucosa every 12 hours  chlorhexidine 2% Cloths 1 Application(s) Topical <User Schedule>  diltiazem    Tablet 60 milliGRAM(s) Oral three times a day  ferrous    sulfate Liquid 300 milliGRAM(s) Enteral Tube daily  heparin   Injectable 5000 Unit(s) SubCutaneous every 8 hours  insulin lispro (ADMELOG) corrective regimen sliding scale   SubCutaneous every 6 hours  Nephro-noam 1 Tablet(s) Oral daily  norepinephrine Infusion 0.05 MICROgram(s)/kG/Min (4.19 mL/Hr) IV Continuous <Continuous>  pantoprazole   Suspension 40 milliGRAM(s) Enteral Tube daily  polyethylene glycol 3350 17 Gram(s) Oral daily  propofol Infusion 30.015 MICROgram(s)/kG/Min (8.05 mL/Hr) IV Continuous <Continuous>  senna 2 Tablet(s) Oral at bedtime  sodium chloride 3%  Inhalation 4 milliLiter(s) Inhalation every 6 hours    MEDICATIONS  (PRN):      10-15-24 @ 07:01  -  10-16-24 @ 07:00  --------------------------------------------------------  IN: 706.7 mL / OUT: 0 mL / NET: 706.7 mL    10-16-24 @ 07:01  -  10-16-24 @ 14:59  --------------------------------------------------------  IN: 156 mL / OUT: 0 mL / NET: 156 mL      PHYSICAL EXAM:      T(C): 37.5 (10-16-24 @ 14:50), Max: 38.1 (10-16-24 @ 01:00)  HR: 76 (10-16-24 @ 14:50) (73 - 90)  BP: --  RR: 25 (10-16-24 @ 14:50) (19 - 31)  SpO2: 96% (10-16-24 @ 14:50) (88% - 100%)  Wt(kg): --  Lungs clear  Heart S1S2  Abd soft NT ND  Extremities:   tr edema                                    8.0    22.03 )-----------( 223      ( 15 Oct 2024 02:00 )             22.8     10-15    137  |  99  |  66[H]  ----------------------------<  157[H]  3.4[L]   |  21[L]  |  4.66[H]    Ca    9.5      15 Oct 2024 02:00  Phos  3.4     10-15  Mg     2.4     10-15      ABG - ( 15 Oct 2024 08:53 )  pH, Arterial: 7.49  pH, Blood: x     /  pCO2: 30    /  pO2: 68    / HCO3: 23    / Base Excess: 0.4   /  SaO2: 95.9                Creatinine Trend: 4.66<--, 3.34<--, 2.44<--, 3.12<--, 3.69<--, 2.32<--  Mode: AC/ CMV (Assist Control/ Continuous Mandatory Ventilation)  RR (machine): 16  TV (machine): 400  FiO2: 60  PEEP: 8  ITime: 0.9  MAP: 13  PIP: 28      Assessment   ESRD, chronic aspiration, respiratory failure     Plan:  Family now expressing wish to resume HD  Will assess for AM        Delonte Moya MD

## 2024-10-16 NOTE — CHART NOTE - NSCHARTNOTEFT_GEN_A_CORE
Assessment:  Pt seen in ICU, on vent, on trickle feeding c Nepro @ 10 ml/hr x 18 hours via G-Tube.  Pt adm c diagnosis of pneumonia, ESRD on HD.  Pt c recurrent aspirations following extubation and required reintubation.  Plan for palliative extubation was noted, HD has been held, however family at present is undecided on plan of care and plan for palliative consult reported by RN.    PMH include DVT, CVA, DM Type 2 on insulin, HTN, ESRD on HD, dysphagia, PEG, bolus feeding regimen.      Factors impacting intake: [ ] none [ ] nausea  [ ] vomiting [ ] diarrhea [ ] constipation  [ ]chewing problems [x ] swallowing issues  [ x] other: on G-Tube feeding, acute illness    Diet Prescription: Diet, NPO with Tube Feed:   Tube Feeding Modality: Gastrostomy  Nepro with Carb Steady  Total Volume for 24 Hours (mL): 180  Continuous  Starting Tube Feed Rate {mL per Hour}: 10  Increase Tube Feed Rate by (mL): 0     Every 8 hours  Until Goal Tube Feed Rate (mL per Hour): 10  Tube Feed Duration (in Hours): 18  Tube Feed Start Time: 17:00  Tube Feed Stop Time: 11:00  Liquid Protein Supplement     Qty per Day:  1 (10-12-24 @ 16:58)    Intake: <50% nutrition needs x 4 days  Nepro 180 jb=887 calories,  15 grams protein, 131 ml free water + Liquid protein supplement 1 pkg=15 grams protein, 100 calories for a total of 424 calories, 30 grams protein from enteral feeding and Liquid protein supplement.    Pt is on propofol and received 168 ml on 10/15=184.8 calories.      Current Weight: 10/16, 40.9 kg, 10/05(drug dose/adm wt.)43.5 kg, c wt. loss of 2.6 kg since adm  % Weight Change: 5.9%    Pertinent Medications: MEDICATIONS  (STANDING):  albuterol/ipratropium for Nebulization 3 milliLiter(s) Nebulizer every 6 hours  atorvastatin 20 milliGRAM(s) Oral at bedtime  chlorhexidine 0.12% Liquid 15 milliLiter(s) Oral Mucosa every 12 hours  chlorhexidine 2% Cloths 1 Application(s) Topical <User Schedule>  diltiazem    Tablet 60 milliGRAM(s) Oral three times a day  ferrous    sulfate Liquid 300 milliGRAM(s) Enteral Tube daily  heparin   Injectable 5000 Unit(s) SubCutaneous every 8 hours  insulin lispro (ADMELOG) corrective regimen sliding scale   SubCutaneous every 6 hours  Nephro-noam 1 Tablet(s) Oral daily  norepinephrine Infusion 0.05 MICROgram(s)/kG/Min (4.19 mL/Hr) IV Continuous <Continuous>  pantoprazole   Suspension 40 milliGRAM(s) Enteral Tube daily  polyethylene glycol 3350 17 Gram(s) Oral daily  propofol Infusion 30.015 MICROgram(s)/kG/Min (8.05 mL/Hr) IV Continuous <Continuous>  senna 2 Tablet(s) Oral at bedtime  sodium chloride 3%  Inhalation 4 milliLiter(s) Inhalation every 6 hours    MEDICATIONS  (PRN):    Pertinent Labs:   10-15 Na137 mmol/L Glu 157 mg/dL[H] K+ 3.4 mmol/L[L] Cr  4.66 mg/dL[H] BUN 66 mg/dL[H] 10-15 Phos 3.4 mg/dL 10-14 Alb 1.9 g/dL[L] 10-05 Chol 89 mg/dL LDL --    HDL 60 mg/dL Trig 45 mg/dL10-14 ALT 15 U/L AST 11 U/L[L] Alkaline Phosphatase 112 U/L  10-05-24 @ 08:37 a1c 4.8   CAPILLARY BLOOD GLUCOSE      POCT Blood Glucose.: 83 mg/dL (16 Oct 2024 11:10)  POCT Blood Glucose.: 110 mg/dL (16 Oct 2024 06:37)  POCT Blood Glucose.: 116 mg/dL (16 Oct 2024 00:01)  POCT Blood Glucose.: 117 mg/dL (15 Oct 2024 22:02)  POCT Blood Glucose.: 161 mg/dL (15 Oct 2024 17:24)    Skin:   no pressure ulcer noted    Estimated Needs:   [x ] no change since previous assessment(10/06)  [ ] recalculated:     Previous Nutrition Diagnosis: (10/06)  Malnutrition: Severe Malnutrition in context of chronic illness  Etiology: Inadequate energy/protein intake + increased needs related to ESRD on HD, CVA x 2, hx of dysphagia, recurrent aspiration PNA  Signs/Symptoms: Physical findings of severe fat/muscle loss as noted; 12.7% wt loss x 5 months  Goal/Expected Outcome: Pt to meet >75% energy/protein needs via tube feeding & supplement during LOS; not met   Nutrition Diagnosis is [ x] ongoing  [ ] resolved [ ] not applicable       New Nutrition Diagnosis: [x ] not applicable     Interventions:   Recommend  [ ] Change Diet To:  [ ] Nutrition Supplement  [ x] Nutrition Support; as per goals of care.  If HD is held, recommend consider increasing Nepro @ 20 ml/hr and increase slowly by 5 ml q 12 hours via G-Tube to goal of Nepro @ 40 m/hr x 18 hours= 720 ml, 1296 calories, 58 grams protein, 76% RDIs, 526 ml free water.  If HD is resumed, recommend increase Nepro @ 20 ml/hr and increase slowly by 5 ml q 12 hours to goal of Nepro @ 50 ml/hr x 18 hours=900 ml, 1620 calories, 73 grams protein, 654 ml free water + Liquid protein supplement 1 pkg=15 grams protein, 100 calories   [ ] Other:     Monitoring and Evaluation:   [ ] PO intake [ x ] Tolerance to diet prescription [ x ] weights [ x ] labs[ x ] follow up per protocol  [ ] other:

## 2024-10-16 NOTE — PROGRESS NOTE ADULT - SUBJECTIVE AND OBJECTIVE BOX
CHIEF COMPLAINT: Aspiration, acute hypoxic respiratory failure    Interval Events: Patient's wife wishes to resume HD. Also re-considering tracheostomy.     REVIEW OF SYSTEMS:     Unable to perform ROS due to sedation      OBJECTIVE:  ICU Vital Signs Last 24 Hrs  T(C): 37.7 (16 Oct 2024 13:00), Max: 38.1 (16 Oct 2024 01:00)  T(F): 99.9 (16 Oct 2024 13:00), Max: 100.6 (16 Oct 2024 01:00)  HR: 82 (16 Oct 2024 13:00) (73 - 90)  BP: --  BP(mean): --  ABP: 105/47 (16 Oct 2024 13:00) (91/45 - 180/65)  ABP(mean): 67 (16 Oct 2024 13:00) (60 - 104)  RR: 20 (16 Oct 2024 13:00) (19 - 31)  SpO2: 100% (16 Oct 2024 13:00) (88% - 100%)    O2 Parameters below as of 16 Oct 2024 01:00  Patient On (Oxygen Delivery Method): ventilator, /RATE16/PEEP8    O2 Concentration (%): 80      Mode: AC/ CMV (Assist Control/ Continuous Mandatory Ventilation), RR (machine): 16, TV (machine): 400, FiO2: 60, PEEP: 8, ITime: 0.9, MAP: 13, PIP: 28    10-15 @ 07:01  -  10-16 @ 07:00  --------------------------------------------------------  IN: 706.7 mL / OUT: 0 mL / NET: 706.7 mL    10-16 @ 07:01  -  10-16 @ 13:08  --------------------------------------------------------  IN: 90 mL / OUT: 0 mL / NET: 90 mL      CAPILLARY BLOOD GLUCOSE      POCT Blood Glucose.: 83 mg/dL (16 Oct 2024 11:10)      PHYSICAL EXAM:  GENERAL: NAD, well-groomed, well-developed  EYES: EOMI, PERRLA, conjunctiva and sclera clear  ENMT: ET tube in place   NECK: Supple, No JVD, Normal thyroid  CHEST/LUNG: Clear to auscultation bilaterally; No rales, rhonchi, wheezing, or rubs  HEART: Regular rate and rhythm; No murmurs, rubs, or gallops  ABDOMEN: Soft, Nontender, Nondistended; Bowel sounds present  VASCULAR:  2+ Peripheral Pulses, No clubbing, cyanosis, or edema  LYMPH: No lymphadenopathy noted  SKIN: No rashes or lesions  NERVOUS SYSTEM: Sedated     LINES:    HOSPITAL MEDICATIONS:  heparin   Injectable 5000 Unit(s) SubCutaneous every 8 hours      diltiazem    Tablet 60 milliGRAM(s) Oral three times a day  norepinephrine Infusion 0.05 MICROgram(s)/kG/Min IV Continuous <Continuous>    atorvastatin 20 milliGRAM(s) Oral at bedtime  insulin lispro (ADMELOG) corrective regimen sliding scale   SubCutaneous every 6 hours    albuterol/ipratropium for Nebulization 3 milliLiter(s) Nebulizer every 6 hours  sodium chloride 3%  Inhalation 4 milliLiter(s) Inhalation every 6 hours    propofol Infusion 30.015 MICROgram(s)/kG/Min IV Continuous <Continuous>    pantoprazole   Suspension 40 milliGRAM(s) Enteral Tube daily  polyethylene glycol 3350 17 Gram(s) Oral daily  senna 2 Tablet(s) Oral at bedtime        ferrous    sulfate Liquid 300 milliGRAM(s) Enteral Tube daily  Nephro-noam 1 Tablet(s) Oral daily      chlorhexidine 0.12% Liquid 15 milliLiter(s) Oral Mucosa every 12 hours  chlorhexidine 2% Cloths 1 Application(s) Topical <User Schedule>        LABS:                        8.0    22.03 )-----------( 223      ( 15 Oct 2024 02:00 )             22.8     Hgb Trend: 8.0<--, 8.2<--, 9.0<--, 9.0<--, 9.3<--  10-15    137  |  99  |  66[H]  ----------------------------<  157[H]  3.4[L]   |  21[L]  |  4.66[H]    Ca    9.5      15 Oct 2024 02:00  Phos  3.4     10-15  Mg     2.4     10-15      Creatinine Trend: 4.66<--, 3.34<--, 2.44<--, 3.12<--, 3.69<--, 2.32<--    Urinalysis Basic - ( 15 Oct 2024 02:00 )    Color: x / Appearance: x / SG: x / pH: x  Gluc: 157 mg/dL / Ketone: x  / Bili: x / Urobili: x   Blood: x / Protein: x / Nitrite: x   Leuk Esterase: x / RBC: x / WBC x   Sq Epi: x / Non Sq Epi: x / Bacteria: x      Arterial Blood Gas:  10-15 @ 08:53  7.49/30/68/23/95.9/0.4  ABG lactate: --  Arterial Blood Gas:  10-14 @ 21:12  7.51/29/297/23/100.0/1.0  ABG lactate: --        MICROBIOLOGY:     RADIOLOGY:  [ ] Reviewed and interpreted by me    EKG:

## 2024-10-16 NOTE — PROGRESS NOTE ADULT - ASSESSMENT
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    Neuro: Patient currently sedated with propofol.  Maintain sedation with vent synchrony.    Cardiovascular: Patient in distributive shock secondary to sedation from propofol. Maintain a MAP goal greater than 65.  Respiratory: Patient with acute hypercapnic and hypoxic respiratory failure secondary to aspiration.  Patient with recurrent aspirations following extubation and required reintubation today.  Bronchoscopy performed at the bedside with therapeutic aspiration performed of the right and left lung, copious thick mucus found from both sides of the lung.. Continue with airway clearance.  Titrate ventilator settings based on arterial blood gas.  Patient's wife re-considering tracheostomy   GI: Continue with tube feeds via PEG tube  Renal: Patient with ESRD. Family wishes to resume HD.   ID: Pseudomonas from bronch on 10/14 now resistant to zosyn, will switch to Levaquin which will also treat the stenotrophomonas    Endo: Fingersticks every 6 hours and insulin sliding scale.  C/w Lantus to 10 units nightly  Heme: Hep SQ for DVT prophylaxis   Ethics: Patient currently full code

## 2024-10-16 NOTE — CONSULT NOTE ADULT - PROBLEM SELECTOR RECOMMENDATION 5
h/o stroke in May MRI with PUNCTATE FOCI OF RESTRICTED DIFFUSION IN THE LEFT DA AND   LEFT CEREBELLUM WITH ASSOCIATED T2 PROLONGATION CONSISTENT WITH ACUTE   INFARCTS.  has PEG tube  dependent for care, wheel chair bound

## 2024-10-16 NOTE — CONSULT NOTE ADULT - SUBJECTIVE AND OBJECTIVE BOX
HPI:  Megha Art is a 73 year old male with PMHx of HTN, IDDM2, ESRD on HD (M/W/F, through RUE AV fistula), hx of CVA x 2 (with residual R. sided weakness and dysphagia s/p PEG tube, previously with tracheostomy and now removed), and hx of RLE DVT (completed apixaban course) who presented to the ED on 10/4/24 for complaints of cough and chills.    History obtained from wife at bedside. As per wife, patient typically coughs at baseline since he previously had a tracheostomy. However, for the past 2-3 days, he has been coughing more frequently and it has been productive with yellow phlegm. Wife was assisting him with getting dressed this afternoon around 1 PM when he felt nauseous and started complaining of abdominal pain. Then had a bowel movement. When wife was cleaning him up, patient was shaking "like he had chills" and she thinks his eyes rolled back for a few seconds. No loss of consciousness. EMS was called and another episode of shaking upon EMS arrival. Of note, patient is due for dialysis today but did not receive it since he came to the ER today. Baseline functional status is wheelchair bound and dependent with all ADLs. NPO with Nepro q4. Lives at home with wife.     In the ED, Tmax 101.3, HR as elevated as 118, BP as elevated as 201/68, and SpO2 as low as 91% on room air. Initially placed on 15L NRB with improvement of SpO2 to 95%. Now on room air. WBC 17.38K, hgb 9.9, sodium 129, BUN 61, Cr 4.05, alkphos 169. Lactic acid 3.6. CT head without evidence of acute hemorrhage mass or mass effect but with encephalomalacia and gliosis again seen involving the bifrontal region. CT CAP with pneumonia and rectum distended with stool. Received  cc bolus, vancomycin 1 g IV, zosyn 3.375 g IV, acetaminophen 1 g IV, hydralazine 10 mg IV, pantoprazole 40 mg IV, and ondansetron 4 mg IV. Evaluated by nephrology who is planning for HD tomorrow. (04 Oct 2024 22:03)    PERTINENT PM/SXH:   Diabetes    Stage 5 chronic kidney disease not on chronic dialysis    Benign essential HTN    HLD (hyperlipidemia)    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    ESRD on hemodialysis    CVA (cerebrovascular accident)    HTN (hypertension)    Insulin dependent type 2 diabetes mellitus    History of cerebrovascular accident (CVA) with residual deficit    History of DVT of lower extremity      No significant past surgical history    AVF (arteriovenous fistula)    Arteriovenous fistula    S/P percutaneous endoscopic gastrostomy (PEG) tube placement    History of tracheostomy      FAMILY HISTORY:  FHx: diabetes mellitus (Father, Aunt)      ITEMS NOT CHECKED ARE NOT PRESENT    SOCIAL HISTORY:   Significant other/partner: yes [ ]  Children: yes  [ ]  Voodoo/Spirituality: Congregation  Substance hx:  [ ]   Tobacco hx:  [ ]   Alcohol hx: [ ]   Home Opioid hx:  [ ] I-Stop Reference No: Reference #: [id number]  Living Situation: [x ]Home  [ ]Long term care  [ ]Rehab [ ]Other    ADVANCE DIRECTIVES:    DNR  MOLST  [ ]  Living Will  [ ]   DECISION MAKER(s):  [ ] Health Care Proxy(s)  [x ] Surrogate(s)  [ ] Guardian           Name(s): Phone Number(s): Pauline [phone]    BASELINE (I)ADL(s) (prior to admission):  Levy: [ ]Total  [ ] Moderate [x ]Dependent    Allergies    No Known Allergies    Intolerances    MEDICATIONS  (STANDING):  albuterol/ipratropium for Nebulization 3 milliLiter(s) Nebulizer every 6 hours  atorvastatin 20 milliGRAM(s) Oral at bedtime  chlorhexidine 0.12% Liquid 15 milliLiter(s) Oral Mucosa every 12 hours  chlorhexidine 2% Cloths 1 Application(s) Topical <User Schedule>  diltiazem    Tablet 60 milliGRAM(s) Oral three times a day  ferrous    sulfate Liquid 300 milliGRAM(s) Enteral Tube daily  heparin   Injectable 5000 Unit(s) SubCutaneous every 8 hours  insulin lispro (ADMELOG) corrective regimen sliding scale   SubCutaneous every 6 hours  Nephro-noam 1 Tablet(s) Oral daily  norepinephrine Infusion 0.05 MICROgram(s)/kG/Min (4.19 mL/Hr) IV Continuous <Continuous>  pantoprazole   Suspension 40 milliGRAM(s) Enteral Tube daily  polyethylene glycol 3350 17 Gram(s) Oral daily  propofol Infusion 30.015 MICROgram(s)/kG/Min (8.05 mL/Hr) IV Continuous <Continuous>  senna 2 Tablet(s) Oral at bedtime  sodium chloride 3%  Inhalation 4 milliLiter(s) Inhalation every 6 hours    MEDICATIONS  (PRN):    PRESENT SYMPTOMS: [x ]Unable to obtain due to poor mentation   Source if other than patient:  [ ]Family   [ ]Team     Pain: [ ] yes [ ] no  QOL impact -   Location -                    Aggravating factors -  Quality -  Radiation -  Timing-  Severity (0-10 scale):  Minimal acceptable level (0-10 scale):     PAIN AD Score:     [email or web address] (press ctrl +  left click to view)    Dyspnea:                           [ ]Mild [ ]Moderate [ ]Severe  Anxiety:                             [ ]Mild [ ]Moderate [ ]Severe  Fatigue:                             [ ]Mild [ ]Moderate [ ]Severe  Nausea:                             [ ]Mild [ ]Moderate [ ]Severe  Loss of appetite:              [ ]Mild [ ]Moderate [ ]Severe  Constipation:                    [ ]Mild [ ]Moderate [ ]Severe    Other Symptoms:  [ ]All other review of systems negative     Karnofsky Performance Score/Palliative Performance Status Version 2:       30  %    http://npcrc.org/files/news/palliative_performance_scale_ppsv2.pdf  PHYSICAL EXAM:  Vital Signs Last 24 Hrs  T(C): 37.6 (16 Oct 2024 15:04), Max: 38.1 (16 Oct 2024 01:00)  T(F): 99.7 (16 Oct 2024 15:04), Max: 100.6 (16 Oct 2024 01:00)  HR: 70 (16 Oct 2024 15:04) (70 - 90)  BP: --  BP(mean): --  RR: 23 (16 Oct 2024 15:04) (19 - 31)  SpO2: 93% (16 Oct 2024 15:04) (88% - 100%)    Parameters below as of 16 Oct 2024 01:00  Patient On (Oxygen Delivery Method): ventilator, /RATE16/PEEP8    O2 Concentration (%): 80 I&O's Summary    15 Oct 2024 07:01  -  16 Oct 2024 07:00  --------------------------------------------------------  IN: 706.7 mL / OUT: 0 mL / NET: 706.7 mL    16 Oct 2024 07:01  -  16 Oct 2024 16:32  --------------------------------------------------------  IN: 156 mL / OUT: 0 mL / NET: 156 mL    GENERAL:  [ ]Alert  [ ]Oriented x   [ ]Lethargic  [ ]Cachexia  [x ]Unarousable  [ ]Verbal  [x ]Non-Verbal  Behavioral:   [ ] Anxiety  [ ] Delirium [ ] Agitation [ ] Other  HEENT:  [ ]Normal   [ ]Dry mouth   [ x]ET Tube/Trach  [ ]Oral lesions  PULMONARY:   [ x]Clear [ ]Tachypnea  [ ]Audible excessive secretions   [ ]Rhonchi        [ ]Right [ ]Left [ ]Bilateral  [ ]Crackles        [ ]Right [ ]Left [ ]Bilateral  [ ]Wheezing     [ ]Right [ ]Left [ ]Bilateral  CARDIOVASCULAR:    [x ]Regular [ ]Irregular [ ]Tachy  [ ]Porter [ ]Murmur [ ]Other  GASTROINTESTINAL:  [x ]Soft  [ ]Distended   [ ]+BS  [ ]Non tender [ ]Tender  [ ]PEG [ ]OGT/ NGT  Last BM: 10/16/24 GENITOURINARY:  [ ]Normal [ ] Incontinent   [x ]Oliguria/Anuria   [ ]Abbasi  MUSCULOSKELETAL:   [ ]Normal   [ ]Weakness  [x ]Bed/Wheelchair bound [ ]Edema  NEUROLOGIC:   [ ]No focal deficits  [x ] Cognitive impairment  [ ] Dysphagia [ ]Dysarthria [ ] Paresis [ ]Other   sedated  SKIN:   [ ]Normal   [ ]Pressure ulcer(s)  [ ]Rash    CRITICAL CARE:  [ x] Shock Present  [ x]Septic [ ]Cardiogenic [ ]Neurologic [ ]Hypovolemic  [ x]  Vasopressors [ ]  Inotropes   [x ] Respiratory failure present [ x] mechanical ventilation [ ] non-invasive ventilatory support [ ] High flow  [ ] Acute  [ ] Chronic [ ] Hypoxic  [ ] Hypercarbic [ ] Other  [ ] Other organ failure     LABS:                        8.0    22.03 )-----------( 223      ( 15 Oct 2024 02:00 )             22.8   10-15    137  |  99  |  66[H]  ----------------------------<  157[H]  3.4[L]   |  21[L]  |  4.66[H]    Ca    9.5      15 Oct 2024 02:00  Phos  3.4     10-15  Mg     2.4     10-15    Culture - Bronchial (10.14.24 @ 11:20)    Gram Stain:   Moderate polymorphonuclear leukocytes per low power field  Rare Squamous epithelial cells per low power field  Few Gram Negative Rods per oil power field   -  Imipenem: S 2   -  Levofloxacin: S <=0.5   -  Meropenem: S <=1   -  Piperacillin/Tazobactam: R 64   -  Cefepime: I 16   -  Aztreonam: I 16   -  Ciprofloxacin: S <=0.25   -  Ceftazidime: R >16   Specimen Source: Bronchial   Culture Results:   Few Pseudomonas aeruginosa  Commensal dominick consistent with body site   Organism Identification: Pseudomonas aeruginosa   Organism: Pseudomonas aeruginosa   Method Type: VIDA    Culture - Acid Fast - Bronchial w/Smear (10.14.24 @ 11:20)    Specimen Source: Bronchial   Acid Fast Bacilli Smear:   No acid-fast bacilli seen by fluorochrome stain    Culture - Fungal, Bronchial (10.08.24 @ 20:40)    Specimen Source: Bronchial   Culture Results:   No growth    Culture - Stool (10.08.24 @ 10:00)    Specimen Source: .Stool   Culture Results:   No enteric pathogens isolated.  (Stool culture examined for Salmonella,  Shigella, Campylobacter, Aeromonas, Plesiomonas,  Vibrio, E.coli O157 and Yersinia)    Culture - Blood (10.07.24 @ 18:30)    Specimen Source: .Blood BLOOD   Culture Results:   No growth at 5 days    RADIOLOGY & ADDITIONAL STUDIES:  < from: Xray Chest 1 View- PORTABLE-Urgent (Xray Chest 1 View- PORTABLE-Urgent .) (10.14.24 @ 11:36) >  IMPRESSION: Persistent bibasilar infiltrates showingslightly better   aeration at the right base. Intubation.  --- End of Report ---  < end of copied text >    < from: CT Angio Chest PE Protocol w/ IV Cont (10.07.24 @ 20:28) >  IMPRESSION: Worsening right-sided consolidation with tree-in-bud nodules;   recommend one-month follow-up.  No pulmonary embolus.  --- End of Report ---  < end of copied text >    < from: CT Head No Cont (10.04.24 @ 17:45) >  IMPRESSION: No evidence of acute hemorrhage mass or mass effect  Encephalomalacia and gliosis again seen involving the bifrontal region.  --- End of Report ---  < end of copied text >    < from: CT Chest No Cont (10.04.24 @ 17:32) >  IMPRESSION:  Pneumonia.  Rectum distended with stool.  --- End of Report ---  < end of copied text >    < from: TTE Limited W or WO Ultrasound Enhancing Agent (05.04.24 @ 12:00) >  CONCLUSIONS:    1. Left ventricular systolic function is normal with an ejection fraction of 63 % by Tian's method of disks. There are no regional wall motion abnormalities seen.   2. Compared to the transthoracic echocardiogram performed on 4/30/2024, there have been no significant interval changes.  < end of copied text >    < from: MR Head w/wo IV Cont (05.06.24 @ 18:10) >  IMPRESSION: PUNCTATE FOCI OF RESTRICTED DIFFUSION IN THE LEFT DA AND   LEFT CEREBELLUM WITH ASSOCIATED T2 PROLONGATION CONSISTENT WITH ACUTE   INFARCTS. NO ACUTE INTRACRANIAL HEMORRHAGE. NO AREA OF ABNORMAL   ENHANCEMENT.  --- End of Report ---  < end of copied text >    PROTEIN CALORIE MALNUTRITION PRESENT: [ x] Yes [ ] No  [ x] PPSV2 < or = to 30% [ ] significant weight loss  [ x] poor nutritional intake [x ] catabolic state [ ] anasarca     Artificial Nutrition [ ]     REFERRALS:   [ ]Chaplaincy  [ ] Hospice  [ ]Child Life  [ ]Social Work  [ ]Case management [ ]Holistic Therapy           Goals of Care Document:   Care Coordination Assessment 201 [C. Provider] (10-07-24 @ 12:43)   Progress Notes - Care Coordination [C. Provider] (10-15-24 @ 10:22)

## 2024-10-16 NOTE — CONSULT NOTE ADULT - NS ATTEND AMEND GEN_ALL_CORE FT
73 y M hx of CVA 5/24, WCbound, dysphagia s/p PEG, ESRD on HD, IDDM, gastroparesis, h/o intubation requiring trach later decannulated, and DVT, recurrent hospitalizations, under ICU care for acute hypoxic resp failure, asp PNA. Intubated x2 on this admission, s/p bronch. Treated for pseudomonas in sputum. Intermittent pressor support requirements. HD schedule per renal. Full code, wife reconsidering trach. Pt is , has 5 children. Palliative will follow for ongoing GOC discussion pending clinical course. 73 y M hx of CVA 5/24, WCbound, dysphagia s/p PEG, ESRD on HD, DM, gastroparesis, h/o intubation requiring trach later decannulated, and DVT, recurrent hospitalizations, under ICU care for acute hypoxic resp failure, asp PNA. Intubated x2 on this admission, s/p bronch. Treated for pseudomonas in sputum. Intermittent pressor support requirements. HD schedule per renal. Full code, wife reconsidering trach. Pt is , has 5 children. Palliative will follow for ongoing GOC discussion pending clinical course.

## 2024-10-16 NOTE — PROGRESS NOTE ADULT - ASSESSMENT
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      Plan  Neuro:  Cardio:  Pulm:  GI:  Renal:  Id:  Heme:  Endo:    Dispo:  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      Plan  Neuro: propofol infusion to facilitate safe ventilation   Cardio: on levophed increasing dose add midodrine to wean actively titrate to MAP of 65-70  Pulm: Remains intubated on full vent support 2/2 aspiration pna. now s/p multiple bronchoscopy.  fi02 60% and peep of 8, actively titrate to sp02 above 92%. Goals of care discussion ongoing trach vs extubation to DNR/I. cont chest PT, mucolytic therapy   GI: tube feeds as tolerated, PPI  Renal: strict i/o, renally dose meds prn, next HD in morning   Id: on levaquin for PSA and steno in sputum cx  Heme: heparin sq + SCDs for dvt ppx   Endo: ISS q6     Dispo: pt remains in ICU, full code at present moment

## 2024-10-17 ENCOUNTER — APPOINTMENT (OUTPATIENT)
Dept: ENDOCRINOLOGY | Facility: CLINIC | Age: 74
End: 2024-10-17

## 2024-10-17 LAB
ALBUMIN SERPL ELPH-MCNC: 1.6 G/DL — LOW (ref 3.3–5)
ALP SERPL-CCNC: 106 U/L — SIGNIFICANT CHANGE UP (ref 40–120)
ALT FLD-CCNC: 17 U/L — SIGNIFICANT CHANGE UP (ref 12–78)
ANION GAP SERPL CALC-SCNC: 18 MMOL/L — HIGH (ref 5–17)
ANISOCYTOSIS BLD QL: SIGNIFICANT CHANGE UP
AST SERPL-CCNC: 27 U/L — SIGNIFICANT CHANGE UP (ref 15–37)
BASOPHILS # BLD AUTO: 0.03 K/UL — SIGNIFICANT CHANGE UP (ref 0–0.2)
BASOPHILS NFR BLD AUTO: 0.2 % — SIGNIFICANT CHANGE UP (ref 0–2)
BILIRUB SERPL-MCNC: 0.4 MG/DL — SIGNIFICANT CHANGE UP (ref 0.2–1.2)
BLD GP AB SCN SERPL QL: SIGNIFICANT CHANGE UP
BUN SERPL-MCNC: 88 MG/DL — HIGH (ref 7–23)
CALCIUM SERPL-MCNC: 8.7 MG/DL — SIGNIFICANT CHANGE UP (ref 8.5–10.1)
CHLORIDE SERPL-SCNC: 97 MMOL/L — SIGNIFICANT CHANGE UP (ref 96–108)
CO2 SERPL-SCNC: 19 MMOL/L — LOW (ref 22–31)
CREAT SERPL-MCNC: 6.52 MG/DL — HIGH (ref 0.5–1.3)
EGFR: 8 ML/MIN/1.73M2 — LOW
EOSINOPHIL # BLD AUTO: 0.11 K/UL — SIGNIFICANT CHANGE UP (ref 0–0.5)
EOSINOPHIL NFR BLD AUTO: 0.6 % — SIGNIFICANT CHANGE UP (ref 0–6)
GLUCOSE BLDC GLUCOMTR-MCNC: 122 MG/DL — HIGH (ref 70–99)
GLUCOSE BLDC GLUCOMTR-MCNC: 129 MG/DL — HIGH (ref 70–99)
GLUCOSE BLDC GLUCOMTR-MCNC: 166 MG/DL — HIGH (ref 70–99)
GLUCOSE BLDC GLUCOMTR-MCNC: 174 MG/DL — HIGH (ref 70–99)
GLUCOSE BLDC GLUCOMTR-MCNC: 176 MG/DL — HIGH (ref 70–99)
GLUCOSE SERPL-MCNC: 70 MG/DL — SIGNIFICANT CHANGE UP (ref 70–99)
HCT VFR BLD CALC: 20.2 % — CRITICAL LOW (ref 39–50)
HGB BLD-MCNC: 7.1 G/DL — LOW (ref 13–17)
HYPOCHROMIA BLD QL: SLIGHT — SIGNIFICANT CHANGE UP
IMM GRANULOCYTES NFR BLD AUTO: 0.9 % — SIGNIFICANT CHANGE UP (ref 0–0.9)
LYMPHOCYTES # BLD AUTO: 0.77 K/UL — LOW (ref 1–3.3)
LYMPHOCYTES # BLD AUTO: 4.2 % — LOW (ref 13–44)
MAGNESIUM SERPL-MCNC: 2.5 MG/DL — SIGNIFICANT CHANGE UP (ref 1.6–2.6)
MANUAL SMEAR VERIFICATION: SIGNIFICANT CHANGE UP
MCHC RBC-ENTMCNC: 22.9 PG — LOW (ref 27–34)
MCHC RBC-ENTMCNC: 35.1 G/DL — SIGNIFICANT CHANGE UP (ref 32–36)
MCV RBC AUTO: 65.2 FL — LOW (ref 80–100)
MICROCYTES BLD QL: SLIGHT — SIGNIFICANT CHANGE UP
MONOCYTES # BLD AUTO: 0.7 K/UL — SIGNIFICANT CHANGE UP (ref 0–0.9)
MONOCYTES NFR BLD AUTO: 3.8 % — SIGNIFICANT CHANGE UP (ref 2–14)
NEUTROPHILS # BLD AUTO: 16.64 K/UL — HIGH (ref 1.8–7.4)
NEUTROPHILS NFR BLD AUTO: 90.3 % — HIGH (ref 43–77)
NRBC # BLD: 0 /100 WBCS — SIGNIFICANT CHANGE UP (ref 0–0)
PHOSPHATE SERPL-MCNC: 5.4 MG/DL — HIGH (ref 2.5–4.5)
PLAT MORPH BLD: NORMAL — SIGNIFICANT CHANGE UP
PLATELET # BLD AUTO: 202 K/UL — SIGNIFICANT CHANGE UP (ref 150–400)
POIKILOCYTOSIS BLD QL AUTO: SLIGHT — SIGNIFICANT CHANGE UP
POLYCHROMASIA BLD QL SMEAR: SLIGHT — SIGNIFICANT CHANGE UP
POTASSIUM SERPL-MCNC: 3.6 MMOL/L — SIGNIFICANT CHANGE UP (ref 3.5–5.3)
POTASSIUM SERPL-SCNC: 3.6 MMOL/L — SIGNIFICANT CHANGE UP (ref 3.5–5.3)
PROT SERPL-MCNC: 6.7 GM/DL — SIGNIFICANT CHANGE UP (ref 6–8.3)
RBC # BLD: 3.1 M/UL — LOW (ref 4.2–5.8)
RBC # FLD: 20.9 % — HIGH (ref 10.3–14.5)
RBC BLD AUTO: ABNORMAL
SODIUM SERPL-SCNC: 134 MMOL/L — LOW (ref 135–145)
SPHEROCYTES BLD QL SMEAR: SLIGHT — SIGNIFICANT CHANGE UP
WBC # BLD: 18.41 K/UL — HIGH (ref 3.8–10.5)
WBC # FLD AUTO: 18.41 K/UL — HIGH (ref 3.8–10.5)

## 2024-10-17 PROCEDURE — 99222 1ST HOSP IP/OBS MODERATE 55: CPT

## 2024-10-17 PROCEDURE — 99223 1ST HOSP IP/OBS HIGH 75: CPT

## 2024-10-17 PROCEDURE — 99291 CRITICAL CARE FIRST HOUR: CPT

## 2024-10-17 RX ORDER — HEPARIN SODIUM 10000 [USP'U]/ML
5000 INJECTION INTRAVENOUS; SUBCUTANEOUS EVERY 12 HOURS
Refills: 0 | Status: DISCONTINUED | OUTPATIENT
Start: 2024-10-17 | End: 2024-10-23

## 2024-10-17 RX ORDER — NOREPINEPHRINE BITARTRATE 1 MG/ML
0.06 INJECTION, SOLUTION, CONCENTRATE INTRAVENOUS
Qty: 8 | Refills: 0 | Status: DISCONTINUED | OUTPATIENT
Start: 2024-10-17 | End: 2024-10-18

## 2024-10-17 RX ADMIN — HEPARIN SODIUM 5000 UNIT(S): 10000 INJECTION INTRAVENOUS; SUBCUTANEOUS at 17:29

## 2024-10-17 RX ADMIN — IPRATROPIUM BROMIDE AND ALBUTEROL SULFATE 3 MILLILITER(S): .5; 2.5 SOLUTION RESPIRATORY (INHALATION) at 17:26

## 2024-10-17 RX ADMIN — Medication 1 TABLET(S): at 14:34

## 2024-10-17 RX ADMIN — MIDODRINE HYDROCHLORIDE 10 MILLIGRAM(S): 2.5 TABLET ORAL at 14:14

## 2024-10-17 RX ADMIN — IPRATROPIUM BROMIDE AND ALBUTEROL SULFATE 3 MILLILITER(S): .5; 2.5 SOLUTION RESPIRATORY (INHALATION) at 05:06

## 2024-10-17 RX ADMIN — PROPOFOL 8.05 MICROGRAM(S)/KG/MIN: 10 INJECTION, EMULSION INTRAVENOUS at 18:38

## 2024-10-17 RX ADMIN — Medication 20 MILLIGRAM(S): at 21:30

## 2024-10-17 RX ADMIN — MIDODRINE HYDROCHLORIDE 10 MILLIGRAM(S): 2.5 TABLET ORAL at 05:08

## 2024-10-17 RX ADMIN — MIDODRINE HYDROCHLORIDE 10 MILLIGRAM(S): 2.5 TABLET ORAL at 21:30

## 2024-10-17 RX ADMIN — CHLORHEXIDINE GLUCONATE 1 APPLICATION(S): 40 SOLUTION TOPICAL at 05:18

## 2024-10-17 RX ADMIN — HEPARIN SODIUM 5000 UNIT(S): 10000 INJECTION INTRAVENOUS; SUBCUTANEOUS at 05:09

## 2024-10-17 RX ADMIN — IPRATROPIUM BROMIDE AND ALBUTEROL SULFATE 3 MILLILITER(S): .5; 2.5 SOLUTION RESPIRATORY (INHALATION) at 00:12

## 2024-10-17 RX ADMIN — CHLORHEXIDINE GLUCONATE 15 MILLILITER(S): 40 SOLUTION TOPICAL at 05:08

## 2024-10-17 RX ADMIN — SODIUM CHLORIDE 4 MILLILITER(S): 9 INJECTION, SOLUTION INTRAMUSCULAR; INTRAVENOUS; SUBCUTANEOUS at 00:12

## 2024-10-17 RX ADMIN — SODIUM CHLORIDE 4 MILLILITER(S): 9 INJECTION, SOLUTION INTRAMUSCULAR; INTRAVENOUS; SUBCUTANEOUS at 05:06

## 2024-10-17 RX ADMIN — Medication 2: at 12:04

## 2024-10-17 RX ADMIN — CHLORHEXIDINE GLUCONATE 15 MILLILITER(S): 40 SOLUTION TOPICAL at 17:29

## 2024-10-17 RX ADMIN — Medication 2 TABLET(S): at 21:30

## 2024-10-17 RX ADMIN — PROPOFOL 8.05 MICROGRAM(S)/KG/MIN: 10 INJECTION, EMULSION INTRAVENOUS at 06:53

## 2024-10-17 NOTE — PROGRESS NOTE ADULT - SUBJECTIVE AND OBJECTIVE BOX
Patient is a 73y old  Male who presents with a chief complaint of Sepsis and acute hypoxic respiratory failure secondary to suspected aspiration PNA vs. HCAP (17 Oct 2024 14:13)      BRIEF HOSPITAL COURSE:   72 yo m pmhx HTN, DM2, ESRD on HD (MWF, RUE AV Fistula), CVA with residual R sided weakness and dsyphagia s/p peg, s/p trach since decannulated, RLE DVT (completed course of Eliquis) admitted with RLL PNA with course c/b acute hypoxic respiratory failure ultimately requiring intubation, failed trial of extubation and was subsequently reintubated 10/14, distributive shock.     Events last 24 hours:   received on 80%/peep8, hypotensive this evening, restarted on levophed for BP support. HD rescheduled for am, prbc to be given at that time.    PAST MEDICAL & SURGICAL HISTORY:  ESRD on hemodialysis  HTN (hypertension)  Insulin dependent type 2 diabetes mellitus  History of cerebrovascular accident (CVA) with residual deficit  History of DVT of lower extremity  Arteriovenous fistula  S/P percutaneous endoscopic gastrostomy (PEG) tube placement  History of tracheostomy      Allergies  No Known Allergies      FAMILY HISTORY:  FHx: diabetes mellitus (Father, Aunt)      Social History:   from home       Review of Systems:  unable to obtain 2/2 intubated/sedated      Physical Examination:    General: Frail elderly male, lying in bed, nad     HEENT: ett, ogt in place    PULM: coarse b/l    CVS: rrr    ABD: Soft, nondistended, nontender, +Bs    EXT: No edema, nontender    SKIN: Warm    NEURO: sedated      Medications:  levoFLOXacin IVPB 500 milliGRAM(s) IV Intermittent every 48 hours  midodrine 10 milliGRAM(s) Oral every 8 hours  norepinephrine Infusion 0.055 MICROgram(s)/kG/Min IV Continuous <Continuous>  albuterol/ipratropium for Nebulization 3 milliLiter(s) Nebulizer every 6 hours  propofol Infusion 30.015 MICROgram(s)/kG/Min IV Continuous <Continuous>  heparin   Injectable 5000 Unit(s) SubCutaneous every 12 hours  pantoprazole   Suspension 40 milliGRAM(s) Enteral Tube daily  polyethylene glycol 3350 17 Gram(s) Oral daily  senna 2 Tablet(s) Oral at bedtime  atorvastatin 20 milliGRAM(s) Oral at bedtime  insulin lispro (ADMELOG) corrective regimen sliding scale   SubCutaneous every 6 hours  ferrous    sulfate Liquid 300 milliGRAM(s) Enteral Tube daily  Nephro-noam 1 Tablet(s) Oral daily  chlorhexidine 0.12% Liquid 15 milliLiter(s) Oral Mucosa every 12 hours  chlorhexidine 2% Cloths 1 Application(s) Topical <User Schedule>      Mode: AC/ CMV (Assist Control/ Continuous Mandatory Ventilation)  RR (machine): 16  TV (machine): 400  FiO2: 80  PEEP: 8  ITime: 0.9  MAP: 15  PIP: 29      ICU Vital Signs Last 24 Hrs  T(C): 36.9 (17 Oct 2024 23:36), Max: 37.5 (17 Oct 2024 01:00)  T(F): 98.4 (17 Oct 2024 23:36), Max: 99.5 (17 Oct 2024 01:00)  HR: 94 (18 Oct 2024 00:00) (65 - 96)  BP: --  BP(mean): --  ABP: 111/41 (18 Oct 2024 00:00) (87/38 - 164/51)  ABP(mean): 60 (18 Oct 2024 00:00) (53 - 90)  RR: 32 (18 Oct 2024 00:00) (19 - 32)  SpO2: 100% (18 Oct 2024 00:00) (91% - 100%)      Vital Signs Last 24 Hrs  T(C): 36.9 (17 Oct 2024 23:36), Max: 37.5 (17 Oct 2024 01:00)  T(F): 98.4 (17 Oct 2024 23:36), Max: 99.5 (17 Oct 2024 01:00)  HR: 94 (18 Oct 2024 00:00) (65 - 96)  BP: --  BP(mean): --  RR: 32 (18 Oct 2024 00:00) (19 - 32)  SpO2: 100% (18 Oct 2024 00:00) (91% - 100%)      I&O's Detail    16 Oct 2024 07:01  -  17 Oct 2024 07:00  --------------------------------------------------------  IN:    IV PiggyBack: 50 mL    Nepro with Carb Steady: 180 mL    Norepinephrine: 115.7 mL    Propofol: 184 mL  Total IN: 529.7 mL    OUT:    Voided (mL): 0 mL  Total OUT: 0 mL  Total NET: 529.7 mL      17 Oct 2024 07:01  -  18 Oct 2024 00:21  --------------------------------------------------------  IN:    Nepro with Carb Steady: 30 mL    Propofol: 117.9 mL  Total IN: 147.9 mL    OUT:  Total OUT: 0 mL  Total NET: 147.9 mL      LABS:                        7.1    18.41 )-----------( 202      ( 17 Oct 2024 03:40 )             20.2     10-17    134[L]  |  97  |  88[H]  ----------------------------<  70  3.6   |  19[L]  |  6.52[H]    Ca    8.7      17 Oct 2024 03:40  Phos  5.4     10-17  Mg     2.5     10-17    TPro  6.7  /  Alb  1.6[L]  /  TBili  0.4  /  DBili  x   /  AST  27  /  ALT  17  /  AlkPhos  106  10-17      CAPILLARY BLOOD GLUCOSE  POCT Blood Glucose.: 122 mg/dL (17 Oct 2024 23:11)      Urinalysis Basic - ( 17 Oct 2024 03:40 )  Color: x / Appearance: x / SG: x / pH: x  Gluc: 70 mg/dL / Ketone: x  / Bili: x / Urobili: x   Blood: x / Protein: x / Nitrite: x   Leuk Esterase: x / RBC: x / WBC x   Sq Epi: x / Non Sq Epi: x / Bacteria: x      CULTURES:  Culture Results:   Culture is being performed. (10-14 @ 11:20)  Culture Results:   Few Pseudomonas aeruginosa  Commensal dominick consistent with body site (10-14 @ 11:20)      RADIOLOGY:   < from: Xray Chest 1 View- PORTABLE-Urgent (Xray Chest 1 View- PORTABLE-Urgent .) (10.14.24 @ 11:36) >    ACC: [id number] EXAM:  XR CHEST PORTABLE URGENT 1V   ORDERED BY: SEPIDEH REED     PROCEDURE DATE:  10/14/2024      INTERPRETATION:  AP chest on October 14, 2024 at 10:10 AM. Patient was   intubated.    Heart normal for projection.    Thereis a persistent retrocardiac infiltrate and right base infiltrate.   Right base infiltrate may be slightly better aerated than on October 13.    Endotracheal tube insertion.    NG tube is noted.    IMPRESSION: Persistent bibasilar infiltrates showingslightly better   aeration at the right base. Intubation.    --- End of Report ---    ITALIA ALY MD  This document has been electronically signed. Oct 14 2024  2:40PM    < end of copied text >

## 2024-10-17 NOTE — PROGRESS NOTE ADULT - SUBJECTIVE AND OBJECTIVE BOX
CHIEF COMPLAINT: PNA, aspiration    Interval Events: No acute events overnight     REVIEW OF SYSTEMS:    Unable to perform ROS due to sedation       OBJECTIVE:  ICU Vital Signs Last 24 Hrs  T(C): 35.4 (17 Oct 2024 11:00), Max: 38.3 (16 Oct 2024 23:00)  T(F): 95.8 (17 Oct 2024 11:00), Max: 100.9 (16 Oct 2024 23:00)  HR: 85 (17 Oct 2024 12:05) (65 - 88)  BP: --  BP(mean): --  ABP: 123/46 (17 Oct 2024 11:00) (81/42 - 154/54)  ABP(mean): 71 (17 Oct 2024 11:00) (55 - 90)  RR: 21 (17 Oct 2024 11:00) (19 - 27)  SpO2: 100% (17 Oct 2024 11:00) (91% - 100%)      Mode: AC/ CMV (Assist Control/ Continuous Mandatory Ventilation), RR (machine): 16, TV (machine): 400, FiO2: 100, PEEP: 8, ITime: 0.9, MAP: 11, PIP: 17    10-16 @ 07:01  -  10-17 @ 07:00  --------------------------------------------------------  IN: 529.7 mL / OUT: 0 mL / NET: 529.7 mL    10-17 @ 07:01  -  10-17 @ 14:14  --------------------------------------------------------  IN: 62 mL / OUT: 0 mL / NET: 62 mL      CAPILLARY BLOOD GLUCOSE      POCT Blood Glucose.: 176 mg/dL (17 Oct 2024 12:02)      PHYSICAL EXAM:  GENERAL: NAD, well-groomed, well-developed  EYES: EOMI, PERRLA, conjunctiva and sclera clear  ENMT: ET Tube in place   CHEST/LUNG: Clear to auscultation bilaterally; No rales, rhonchi, wheezing, or rubs  HEART: Regular rate and rhythm; No murmurs, rubs, or gallops  ABDOMEN: Soft, Nontender, Nondistended; Bowel sounds present  VASCULAR:  2+ Peripheral Pulses, No clubbing, cyanosis, or edema  LYMPH: No lymphadenopathy noted  SKIN: No rashes or lesions  NERVOUS SYSTEM:  Sedated    LINES:    HOSPITAL MEDICATIONS:  heparin   Injectable 5000 Unit(s) SubCutaneous every 12 hours      midodrine 10 milliGRAM(s) Oral every 8 hours  norepinephrine Infusion 0.05 MICROgram(s)/kG/Min IV Continuous <Continuous>    atorvastatin 20 milliGRAM(s) Oral at bedtime  insulin lispro (ADMELOG) corrective regimen sliding scale   SubCutaneous every 6 hours    albuterol/ipratropium for Nebulization 3 milliLiter(s) Nebulizer every 6 hours    propofol Infusion 30.015 MICROgram(s)/kG/Min IV Continuous <Continuous>    pantoprazole   Suspension 40 milliGRAM(s) Enteral Tube daily  polyethylene glycol 3350 17 Gram(s) Oral daily  senna 2 Tablet(s) Oral at bedtime        ferrous    sulfate Liquid 300 milliGRAM(s) Enteral Tube daily  Nephro-noam 1 Tablet(s) Oral daily      chlorhexidine 0.12% Liquid 15 milliLiter(s) Oral Mucosa every 12 hours  chlorhexidine 2% Cloths 1 Application(s) Topical <User Schedule>        LABS:                        7.1    18.41 )-----------( 202      ( 17 Oct 2024 03:40 )             20.2     Hgb Trend: 7.1<--, 8.0<--, 8.2<--, 9.0<--, 9.0<--  10-17    134[L]  |  97  |  88[H]  ----------------------------<  70  3.6   |  19[L]  |  6.52[H]    Ca    8.7      17 Oct 2024 03:40  Phos  5.4     10-17  Mg     2.5     10-17    TPro  6.7  /  Alb  1.6[L]  /  TBili  0.4  /  DBili  x   /  AST  27  /  ALT  17  /  AlkPhos  106  10-17    Creatinine Trend: 6.52<--, 4.66<--, 3.34<--, 2.44<--, 3.12<--, 3.69<--    Urinalysis Basic - ( 17 Oct 2024 03:40 )    Color: x / Appearance: x / SG: x / pH: x  Gluc: 70 mg/dL / Ketone: x  / Bili: x / Urobili: x   Blood: x / Protein: x / Nitrite: x   Leuk Esterase: x / RBC: x / WBC x   Sq Epi: x / Non Sq Epi: x / Bacteria: x            MICROBIOLOGY:     RADIOLOGY:  [ ] Reviewed and interpreted by me    EKG:

## 2024-10-17 NOTE — CONSULT NOTE ADULT - ASSESSMENT
72 Y/O male with a PMHx of HTN, DM, ESRD on HD, with CVAx2 and dysphagia s/p PEG tube with previous tracheostomy placed in May 2024 and removed July 2024, RLE DVT on Apixaban who was admitted for hypoxia and found to have RLL PNA. Initially admitted to the floor with 3 episodes of hypoxia requiring intubation and upgraded to the ICU. Surgery consulted to evaluate for tracheostomy placement.   Hypothermic. BP requiring vasopressors.   Significant leukocytosis. H/H decreased from previous.   PEEP: 8, FiO2: 100%      PLAN:     - Recommend GOC conversation with family   - Will continue to follow peripherally for possible tracheostomy once patient is medically optimized  - Continue care per ICU   - Discussed with Dr. Wheatley

## 2024-10-17 NOTE — PROGRESS NOTE ADULT - ASSESSMENT
72 yo m pmhx HTN, DM2, ESRD on HD (MWF, RUE AV Fistula), CVA with residual R sided weakness and dsyphagia s/p peg, s/p trach since decannulated, RLE DVT (completed course of Eliquis) admitted with RLL PNA with course c/b acute hypoxic respiratory failure ultimately requiring intubation, failed trial of extubation and was subsequently reintubated 10/14, distributive shock.     NEURO: sedated on propofol  CV: distributive shock requiring vasopressor therapy, actively titrating levophed for map >65, midodrine as adjunctive therapy  RESP: ac/vc 4-6 cc/kg tv lung protective strategies, actively titrating fio2/peep for spo2 >92%, weaning settings as tolerated.  inh bronchodilators/3% saline for secretions  RENAL: ESRD last session 10/12 -2500, nephrology following HD to resumed in am  GI: NPO with tf, bowel regimen, ppi  ENDO: ISS for glycemic control   ID: Levaquin for pseudomonas and Stenotrophomonas in the sputum  HEME: heparin for vte ppx, anemia of chronic disease   DISPO: Full code, palliative cre following    Critical Care time: 47 mins assessing presenting problems of acute illness that poses high probability of life threatening deterioration or end organ damage/dysfunction.  Medical decision making including Initiating plan of care, reviewing data, reviewing radiology, discussing with multidisciplinary team, non inclusive of procedures, discussing goals of care with patient/family    DATE OF DOCUMENTATION EQUIVALENT TO DATE OF SERVICES RENDERED

## 2024-10-17 NOTE — PROGRESS NOTE ADULT - ASSESSMENT
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    Neuro: Patient currently sedated with propofol.  Maintain sedation with vent synchrony.    Cardiovascular: Patient in distributive shock secondary to sedation from propofol. Maintain a MAP goal greater than 65. Continue with levophed.   Respiratory: Patient with acute hypercapnic and hypoxic respiratory failure secondary to aspiration.  Patient with recurrent aspirations following extubation and required reintubation today.  Bronchoscopy performed at the bedside with therapeutic aspiration performed of the right and left lung, copious thick mucus found from both sides of the lung.. Continue with duonebs.  Titrate ventilator settings based on arterial blood gas.  Patient's wife wishes to pursue tracheostomy, surgery consulted.    GI: Continue with tube feeds via PEG tube  Renal: Patient with ESRD. Family wishes to resume HD.   ID: Pseudomonas from bronch on 10/14 now resistant to zosyn, will switch to Levaquin which will also treat the stenotrophomonas for 3 days.    Endo: Fingersticks every 6 hours and insulin sliding scale.  C/w Lantus to 10 units nightly  Heme: Hep SQ for DVT prophylaxis   Ethics: Patient currently full code  73-year-old male with ESRD on hemodialysis, type 2 diabetes, history of CVA x 2 with residual right-sided weakness and dysphagia status post PEG tube placement and previous tracheostomy now decannulated, right lower extremity DVT previously on anticoagulation but now off admitted for acute hypoxic respiratory failure secondary to aspiration pneumonia.    Neuro: Patient currently sedated with propofol.  Maintain sedation with vent synchrony.    Cardiovascular: Patient in distributive shock secondary to sedation from propofol. Maintain a MAP goal greater than 65. Continue with levophed.   Respiratory: Patient with acute hypercapnic and hypoxic respiratory failure secondary to aspiration.  Patient with recurrent aspirations following extubation and required reintubation today.  Bronchoscopy performed at the bedside with therapeutic aspiration performed of the right and left lung, copious thick mucus found from both sides of the lung.. Continue with duonebs.  Titrate ventilator settings based on arterial blood gas.  Patient's wife wishes to pursue tracheostomy, surgery consulted.    GI: Continue with tube feeds via PEG tube  Renal: Patient with ESRD. Family wishes to resume HD.   ID: Pseudomonas from bronch on 10/14 now resistant to zosyn, will switch to Levaquin which will also treat the stenotrophomonas for 3 days.    Endo: Fingersticks every 6 hours and insulin sliding scale.  C/w Lantus to 10 units nightly  Heme: Hep SQ for DVT prophylaxis. Gave 1U of PRBC with HD today.   Ethics: Patient currently full code

## 2024-10-17 NOTE — CONSULT NOTE ADULT - SUBJECTIVE AND OBJECTIVE BOX
Chief Complaint:  Patient is a 73y old  Male who presents with a chief complaint of Sepsis and acute hypoxic respiratory failure secondary to suspected aspiration PNA vs. HCAP (17 Oct 2024 11:37)      HPI:  Megha Art is a 73 year old male with PMHx of HTN, IDDM2, ESRD on HD (M/W/F, through RUE AV fistula), hx of CVA x 2 (with residual R. sided weakness and dysphagia s/p PEG tube, previously with tracheostomy and now removed), and hx of RLE DVT (completed apixaban course) who presented to the ED on 10/4/24 for complaints of cough and chills.    History obtained from wife at bedside. As per wife, patient typically coughs at baseline since he previously had a tracheostomy. However, for the past 2-3 days, he has been coughing more frequently and it has been productive with yellow phlegm. Wife was assisting him with getting dressed this afternoon around 1 PM when he felt nauseous and started complaining of abdominal pain. Then had a bowel movement. When wife was cleaning him up, patient was shaking "like he had chills" and she thinks his eyes rolled back for a few seconds. No loss of consciousness. EMS was called and another episode of shaking upon EMS arrival. Of note, patient is due for dialysis today but did not receive it since he came to the ER today. Baseline functional status is wheelchair bound and dependent with all ADLs. NPO with Nepro q4. Lives at home with wife.     In the ED, Tmax 101.3, HR as elevated as 118, BP as elevated as 201/68, and SpO2 as low as 91% on room air. Initially placed on 15L NRB with improvement of SpO2 to 95%. Now on room air. WBC 17.38K, hgb 9.9, sodium 129, BUN 61, Cr 4.05, alkphos 169. Lactic acid 3.6. CT head without evidence of acute hemorrhage mass or mass effect but with encephalomalacia and gliosis again seen involving the bifrontal region. CT CAP with pneumonia and rectum distended with stool. Received  cc bolus, vancomycin 1 g IV, zosyn 3.375 g IV, acetaminophen 1 g IV, hydralazine 10 mg IV, pantoprazole 40 mg IV, and ondansetron 4 mg IV. Evaluated by nephrology who is planning for HD tomorrow. (04 Oct 2024 22:03)      Interval Hx:     74 Y/O male with a PMHx of HTN, DM, ESRD on HD, with CVAx2 and dysphagia s/p PEG tube with previous tracheostomy placed in May 2024 and removed July 2024, RLE DVT on Apixaban who was admitted for hypoxia and found to have RLL PNA. Initially admitted to the floor with 3 episodes of hypoxia requiring intubation and upgraded to the ICU.     Surgery consulted to evaluate for tracheostomy placement.       PMH/PSH:PAST MEDICAL & SURGICAL HISTORY:  ESRD on hemodialysis      HTN (hypertension)      Insulin dependent type 2 diabetes mellitus      History of cerebrovascular accident (CVA) with residual deficit      History of DVT of lower extremity      Arteriovenous fistula      S/P percutaneous endoscopic gastrostomy (PEG) tube placement      History of tracheostomy          Allergies:  No Known Allergies      Medications:  albuterol/ipratropium for Nebulization 3 milliLiter(s) Nebulizer every 6 hours  atorvastatin 20 milliGRAM(s) Oral at bedtime  chlorhexidine 0.12% Liquid 15 milliLiter(s) Oral Mucosa every 12 hours  chlorhexidine 2% Cloths 1 Application(s) Topical <User Schedule>  ferrous    sulfate Liquid 300 milliGRAM(s) Enteral Tube daily  heparin   Injectable 5000 Unit(s) SubCutaneous every 12 hours  insulin lispro (ADMELOG) corrective regimen sliding scale   SubCutaneous every 6 hours  midodrine 10 milliGRAM(s) Oral every 8 hours  Nephro-noam 1 Tablet(s) Oral daily  norepinephrine Infusion 0.05 MICROgram(s)/kG/Min IV Continuous <Continuous>  pantoprazole   Suspension 40 milliGRAM(s) Enteral Tube daily  polyethylene glycol 3350 17 Gram(s) Oral daily  propofol Infusion 30.015 MICROgram(s)/kG/Min IV Continuous <Continuous>  senna 2 Tablet(s) Oral at bedtime      Review of Systems:  Negative except for HPI    Relevant Family History:   FAMILY HISTORY:  FHx: diabetes mellitus (Father, Aunt)        Relevant Social History: Alcohol ( -) , Tobacco ( -) , Illicit drugs (- )     Physical Exam:    Vital Signs:  Vital Signs Last 24 Hrs  T(C): 35.4 (17 Oct 2024 11:00), Max: 38.3 (16 Oct 2024 23:00)  T(F): 95.8 (17 Oct 2024 11:00), Max: 100.9 (16 Oct 2024 23:00)  HR: 85 (17 Oct 2024 12:05) (65 - 88)  BP: --  BP(mean): --  RR: 21 (17 Oct 2024 11:00) (19 - 27)  SpO2: 100% (17 Oct 2024 11:00) (91% - 100%)      Daily     Daily     General:  Intubated, sedated, NAD   HEENT:  NC/AT  Chest:  Full & symmetric excursion, no increased effort, breathing synchronously with ventilator   Cardiovascular:  Regular rhythm  Abdomen:  Soft, non tender, non distended, normoactive bowel sounds, PEG in place   Extremities:  No cyanosis, clubbing or edema  Skin:  No rash/erythema/ecchymoses/petechiae/wounds/abscess/warm/dry  Neuro/Psych:  Alert, oriented, no asterixis, no tremor, no encephalopathy    Laboratory:                          7.1    18.41 )-----------( 202      ( 17 Oct 2024 03:40 )             20.2     10-17    134[L]  |  97  |  88[H]  ----------------------------<  70  3.6   |  19[L]  |  6.52[H]    Ca    8.7      17 Oct 2024 03:40  Phos  5.4     10-17  Mg     2.5     10-17    TPro  6.7  /  Alb  1.6[L]  /  TBili  0.4  /  DBili  x   /  AST  27  /  ALT  17  /  AlkPhos  106  10-17    LIVER FUNCTIONS - ( 17 Oct 2024 03:40 )  Alb: 1.6 g/dL / Pro: 6.7 gm/dL / ALK PHOS: 106 U/L / ALT: 17 U/L / AST: 27 U/L / GGT: x             Urinalysis Basic - ( 17 Oct 2024 03:40 )    Color: x / Appearance: x / SG: x / pH: x  Gluc: 70 mg/dL / Ketone: x  / Bili: x / Urobili: x   Blood: x / Protein: x / Nitrite: x   Leuk Esterase: x / RBC: x / WBC x   Sq Epi: x / Non Sq Epi: x / Bacteria: x          Intake and Output    10-16-24 @ 07:01  -  10-17-24 @ 07:00  --------------------------------------------------------  IN: 529.7 mL / OUT: 0 mL / NET: 529.7 mL    10-17-24 @ 07:01  -  10-17-24 @ 14:13  --------------------------------------------------------  IN: 62 mL / OUT: 0 mL / NET: 62 mL

## 2024-10-17 NOTE — PROGRESS NOTE ADULT - SUBJECTIVE AND OBJECTIVE BOX
[facility] NEPHROLOGY SERVICES, [facility]  NEPHROLOGY AND HYPERTENSION  [address]  [address]  [place] [address]  [phone]    MD RONNA CHANG, MD CHERI OBRIEN MD CHRISTOPHER CAPUTO, MD VIOLET GOMES MD          Patient events noted  No distress      MEDICATIONS  (STANDING):  albuterol/ipratropium for Nebulization 3 milliLiter(s) Nebulizer every 6 hours  atorvastatin 20 milliGRAM(s) Oral at bedtime  chlorhexidine 0.12% Liquid 15 milliLiter(s) Oral Mucosa every 12 hours  chlorhexidine 2% Cloths 1 Application(s) Topical <User Schedule>  ferrous    sulfate Liquid 300 milliGRAM(s) Enteral Tube daily  heparin   Injectable 5000 Unit(s) SubCutaneous every 12 hours  insulin lispro (ADMELOG) corrective regimen sliding scale   SubCutaneous every 6 hours  midodrine 10 milliGRAM(s) Oral every 8 hours  Nephro-noam 1 Tablet(s) Oral daily  norepinephrine Infusion 0.05 MICROgram(s)/kG/Min (4.19 mL/Hr) IV Continuous <Continuous>  pantoprazole   Suspension 40 milliGRAM(s) Enteral Tube daily  polyethylene glycol 3350 17 Gram(s) Oral daily  propofol Infusion 30.015 MICROgram(s)/kG/Min (8.05 mL/Hr) IV Continuous <Continuous>  senna 2 Tablet(s) Oral at bedtime    MEDICATIONS  (PRN):      10-16-24 @ 07:01  -  10-17-24 @ 07:00  --------------------------------------------------------  IN: 529.7 mL / OUT: 0 mL / NET: 529.7 mL    10-17-24 @ 07:01  -  10-17-24 @ 11:37  --------------------------------------------------------  IN: 62 mL / OUT: 0 mL / NET: 62 mL      PHYSICAL EXAM:      T(C): 35.4 (10-17-24 @ 11:00), Max: 38.3 (10-16-24 @ 23:00)  HR: 82 (10-17-24 @ 11:00) (65 - 88)  BP: --  RR: 21 (10-17-24 @ 11:00) (19 - 27)  SpO2: 100% (10-17-24 @ 11:00) (91% - 100%)  Wt(kg): --  Lungs clear  Heart S1S2  Abd soft NT ND  Extremities:   tr edema                                    7.1    18.41 )-----------( 202      ( 17 Oct 2024 03:40 )             20.2     10-17    134[L]  |  97  |  88[H]  ----------------------------<  70  3.6   |  19[L]  |  6.52[H]    Ca    8.7      17 Oct 2024 03:40  Phos  5.4     10-17  Mg     2.5     10-17    TPro  6.7  /  Alb  1.6[L]  /  TBili  0.4  /  DBili  x   /  AST  27  /  ALT  17  /  AlkPhos  106  10-17      LIVER FUNCTIONS - ( 17 Oct 2024 03:40 )  Alb: 1.6 g/dL / Pro: 6.7 gm/dL / ALK PHOS: 106 U/L / ALT: 17 U/L / AST: 27 U/L / GGT: x           Creatinine Trend: 6.52<--, 4.66<--, 3.34<--, 2.44<--, 3.12<--, 3.69<--  Mode: AC/ CMV (Assist Control/ Continuous Mandatory Ventilation)  RR (machine): 16  TV (machine): 400  FiO2: 100  PEEP: 8  ITime: 1  MAP: 11  PIP: 17    Assessment   ESRD, chronic aspiration, respiratory failure     Plan:  Family now expressing wish to resume HD  No immediate indication  Will assess for AM  Palliative care follow up        Delonte Moya MD

## 2024-10-18 LAB
ALBUMIN SERPL ELPH-MCNC: 1.6 G/DL — LOW (ref 3.3–5)
ALBUMIN SERPL ELPH-MCNC: 1.6 G/DL — LOW (ref 3.3–5)
ALBUMIN SERPL ELPH-MCNC: 1.7 G/DL — LOW (ref 3.3–5)
ALP SERPL-CCNC: 133 U/L — HIGH (ref 40–120)
ALP SERPL-CCNC: 149 U/L — HIGH (ref 40–120)
ALP SERPL-CCNC: 151 U/L — HIGH (ref 40–120)
ALT FLD-CCNC: 1149 U/L — HIGH (ref 12–78)
ALT FLD-CCNC: 1912 U/L — HIGH (ref 12–78)
ALT FLD-CCNC: 2209 U/L — HIGH (ref 12–78)
AMMONIA BLD-MCNC: 42 UMOL/L — HIGH (ref 11–32)
ANION GAP SERPL CALC-SCNC: 18 MMOL/L — HIGH (ref 5–17)
ANION GAP SERPL CALC-SCNC: 24 MMOL/L — HIGH (ref 5–17)
AST SERPL-CCNC: 2619 U/L — HIGH (ref 15–37)
AST SERPL-CCNC: 5701 U/L — HIGH (ref 15–37)
AST SERPL-CCNC: SIGNIFICANT CHANGE UP U/L (ref 15–37)
BASOPHILS # BLD AUTO: 0.08 K/UL — SIGNIFICANT CHANGE UP (ref 0–0.2)
BASOPHILS NFR BLD AUTO: 0.2 % — SIGNIFICANT CHANGE UP (ref 0–2)
BILIRUB DIRECT SERPL-MCNC: 0.2 MG/DL — SIGNIFICANT CHANGE UP (ref 0–0.3)
BILIRUB INDIRECT FLD-MCNC: 0.2 MG/DL — SIGNIFICANT CHANGE UP (ref 0.2–1)
BILIRUB SERPL-MCNC: 0.4 MG/DL — SIGNIFICANT CHANGE UP (ref 0.2–1.2)
BILIRUB SERPL-MCNC: 0.4 MG/DL — SIGNIFICANT CHANGE UP (ref 0.2–1.2)
BILIRUB SERPL-MCNC: 0.5 MG/DL — SIGNIFICANT CHANGE UP (ref 0.2–1.2)
BUN SERPL-MCNC: 107 MG/DL — HIGH (ref 7–23)
BUN SERPL-MCNC: 120 MG/DL — HIGH (ref 7–23)
CALCIUM SERPL-MCNC: 7.8 MG/DL — LOW (ref 8.5–10.1)
CALCIUM SERPL-MCNC: 8.1 MG/DL — LOW (ref 8.5–10.1)
CHLORIDE SERPL-SCNC: 96 MMOL/L — SIGNIFICANT CHANGE UP (ref 96–108)
CHLORIDE SERPL-SCNC: 97 MMOL/L — SIGNIFICANT CHANGE UP (ref 96–108)
CO2 SERPL-SCNC: 16 MMOL/L — LOW (ref 22–31)
CO2 SERPL-SCNC: 19 MMOL/L — LOW (ref 22–31)
CREAT SERPL-MCNC: 7.34 MG/DL — HIGH (ref 0.5–1.3)
CREAT SERPL-MCNC: 7.91 MG/DL — HIGH (ref 0.5–1.3)
EGFR: 7 ML/MIN/1.73M2 — LOW
EGFR: 7 ML/MIN/1.73M2 — LOW
EOSINOPHIL # BLD AUTO: 0.01 K/UL — SIGNIFICANT CHANGE UP (ref 0–0.5)
EOSINOPHIL NFR BLD AUTO: 0 % — SIGNIFICANT CHANGE UP (ref 0–6)
GAS PNL BLDA: SIGNIFICANT CHANGE UP
GAS PNL BLDA: SIGNIFICANT CHANGE UP
GLUCOSE BLDC GLUCOMTR-MCNC: 145 MG/DL — HIGH (ref 70–99)
GLUCOSE BLDC GLUCOMTR-MCNC: 162 MG/DL — HIGH (ref 70–99)
GLUCOSE BLDC GLUCOMTR-MCNC: 207 MG/DL — HIGH (ref 70–99)
GLUCOSE BLDC GLUCOMTR-MCNC: 263 MG/DL — HIGH (ref 70–99)
GLUCOSE SERPL-MCNC: 186 MG/DL — HIGH (ref 70–99)
GLUCOSE SERPL-MCNC: 251 MG/DL — HIGH (ref 70–99)
HCT VFR BLD CALC: 21.3 % — LOW (ref 39–50)
HGB BLD-MCNC: 7.4 G/DL — LOW (ref 13–17)
IMM GRANULOCYTES NFR BLD AUTO: 1.2 % — HIGH (ref 0–0.9)
LACTATE SERPL-SCNC: 2.5 MMOL/L — HIGH (ref 0.7–2)
LIDOCAIN IGE QN: 43 U/L — SIGNIFICANT CHANGE UP (ref 13–75)
LYMPHOCYTES # BLD AUTO: 0.63 K/UL — LOW (ref 1–3.3)
LYMPHOCYTES # BLD AUTO: 1.8 % — LOW (ref 13–44)
MAGNESIUM SERPL-MCNC: 2.7 MG/DL — HIGH (ref 1.6–2.6)
MAGNESIUM SERPL-MCNC: 2.9 MG/DL — HIGH (ref 1.6–2.6)
MCHC RBC-ENTMCNC: 22.9 PG — LOW (ref 27–34)
MCHC RBC-ENTMCNC: 34.7 G/DL — SIGNIFICANT CHANGE UP (ref 32–36)
MCV RBC AUTO: 65.9 FL — LOW (ref 80–100)
MONOCYTES # BLD AUTO: 0.94 K/UL — HIGH (ref 0–0.9)
MONOCYTES NFR BLD AUTO: 2.8 % — SIGNIFICANT CHANGE UP (ref 2–14)
NEUTROPHILS # BLD AUTO: 32.1 K/UL — HIGH (ref 1.8–7.4)
NEUTROPHILS NFR BLD AUTO: 94 % — HIGH (ref 43–77)
NRBC # BLD: 0 /100 WBCS — SIGNIFICANT CHANGE UP (ref 0–0)
PHOSPHATE SERPL-MCNC: 8.7 MG/DL — HIGH (ref 2.5–4.5)
PHOSPHATE SERPL-MCNC: 9.3 MG/DL — HIGH (ref 2.5–4.5)
PLATELET # BLD AUTO: 292 K/UL — SIGNIFICANT CHANGE UP (ref 150–400)
POTASSIUM SERPL-MCNC: 4.9 MMOL/L — SIGNIFICANT CHANGE UP (ref 3.5–5.3)
POTASSIUM SERPL-MCNC: 5.4 MMOL/L — HIGH (ref 3.5–5.3)
POTASSIUM SERPL-SCNC: 4.9 MMOL/L — SIGNIFICANT CHANGE UP (ref 3.5–5.3)
POTASSIUM SERPL-SCNC: 5.4 MMOL/L — HIGH (ref 3.5–5.3)
PROCALCITONIN SERPL-MCNC: 10.1 NG/ML — HIGH (ref 0.02–0.1)
PROT SERPL-MCNC: 6 GM/DL — SIGNIFICANT CHANGE UP (ref 6–8.3)
PROT SERPL-MCNC: 6.7 GM/DL — SIGNIFICANT CHANGE UP (ref 6–8.3)
PROT SERPL-MCNC: 6.9 GM/DL — SIGNIFICANT CHANGE UP (ref 6–8.3)
RBC # BLD: 3.23 M/UL — LOW (ref 4.2–5.8)
RBC # FLD: 21.9 % — HIGH (ref 10.3–14.5)
SODIUM SERPL-SCNC: 134 MMOL/L — LOW (ref 135–145)
SODIUM SERPL-SCNC: 136 MMOL/L — SIGNIFICANT CHANGE UP (ref 135–145)
TRIGL SERPL-MCNC: 231 MG/DL — HIGH
WBC # BLD: 34.17 K/UL — HIGH (ref 3.8–10.5)
WBC # FLD AUTO: 34.17 K/UL — HIGH (ref 3.8–10.5)

## 2024-10-18 PROCEDURE — 99233 SBSQ HOSP IP/OBS HIGH 50: CPT

## 2024-10-18 PROCEDURE — 71045 X-RAY EXAM CHEST 1 VIEW: CPT | Mod: 26

## 2024-10-18 PROCEDURE — 76705 ECHO EXAM OF ABDOMEN: CPT | Mod: 26

## 2024-10-18 PROCEDURE — 99232 SBSQ HOSP IP/OBS MODERATE 35: CPT

## 2024-10-18 PROCEDURE — 99291 CRITICAL CARE FIRST HOUR: CPT

## 2024-10-18 RX ORDER — FENTANYL CITRAT/DEXTROSE 5%/PF 1250MCG/50
0.5 PATIENT CONTROLLED ANALGESIA SYRINGE INTRAVENOUS
Qty: 2500 | Refills: 0 | Status: DISCONTINUED | OUTPATIENT
Start: 2024-10-18 | End: 2024-10-20

## 2024-10-18 RX ORDER — SODIUM ZIRCONIUM CYCLOSILICATE 10 G/10G
10 POWDER, FOR SUSPENSION ORAL ONCE
Refills: 0 | Status: DISCONTINUED | OUTPATIENT
Start: 2024-10-18 | End: 2024-10-18

## 2024-10-18 RX ORDER — SODIUM CHLORIDE 9 MG/ML
4 INJECTION, SOLUTION INTRAMUSCULAR; INTRAVENOUS; SUBCUTANEOUS EVERY 6 HOURS
Refills: 0 | Status: DISCONTINUED | OUTPATIENT
Start: 2024-10-18 | End: 2024-10-23

## 2024-10-18 RX ORDER — PHENYLEPHRINE HCL IN 0.9% NACL 20MG/250ML
1 PLASTIC BAG, INJECTION (ML) INTRAVENOUS
Qty: 40 | Refills: 0 | Status: DISCONTINUED | OUTPATIENT
Start: 2024-10-18 | End: 2024-10-21

## 2024-10-18 RX ADMIN — SODIUM CHLORIDE 4 MILLILITER(S): 9 INJECTION, SOLUTION INTRAMUSCULAR; INTRAVENOUS; SUBCUTANEOUS at 11:14

## 2024-10-18 RX ADMIN — IPRATROPIUM BROMIDE AND ALBUTEROL SULFATE 3 MILLILITER(S): .5; 2.5 SOLUTION RESPIRATORY (INHALATION) at 17:09

## 2024-10-18 RX ADMIN — Medication 6: at 12:32

## 2024-10-18 RX ADMIN — HEPARIN SODIUM 5000 UNIT(S): 10000 INJECTION INTRAVENOUS; SUBCUTANEOUS at 18:32

## 2024-10-18 RX ADMIN — CHLORHEXIDINE GLUCONATE 15 MILLILITER(S): 40 SOLUTION TOPICAL at 05:44

## 2024-10-18 RX ADMIN — SODIUM CHLORIDE 4 MILLILITER(S): 9 INJECTION, SOLUTION INTRAMUSCULAR; INTRAVENOUS; SUBCUTANEOUS at 17:09

## 2024-10-18 RX ADMIN — PANTOPRAZOLE SODIUM 40 MILLIGRAM(S): 40 TABLET, DELAYED RELEASE ORAL at 12:32

## 2024-10-18 RX ADMIN — MIDODRINE HYDROCHLORIDE 10 MILLIGRAM(S): 2.5 TABLET ORAL at 05:44

## 2024-10-18 RX ADMIN — Medication 2.03 MICROGRAM(S)/KG/HR: at 08:41

## 2024-10-18 RX ADMIN — Medication 1 TABLET(S): at 13:26

## 2024-10-18 RX ADMIN — PROPOFOL 8.05 MICROGRAM(S)/KG/MIN: 10 INJECTION, EMULSION INTRAVENOUS at 05:44

## 2024-10-18 RX ADMIN — POLYETHYLENE GLYCOL 3350 17 GRAM(S): 17 POWDER, FOR SOLUTION ORAL at 12:33

## 2024-10-18 RX ADMIN — IPRATROPIUM BROMIDE AND ALBUTEROL SULFATE 3 MILLILITER(S): .5; 2.5 SOLUTION RESPIRATORY (INHALATION) at 00:28

## 2024-10-18 RX ADMIN — Medication 15.2 MICROGRAM(S)/KG/MIN: at 14:55

## 2024-10-18 RX ADMIN — CHLORHEXIDINE GLUCONATE 15 MILLILITER(S): 40 SOLUTION TOPICAL at 18:32

## 2024-10-18 RX ADMIN — Medication 2.03 MICROGRAM(S)/KG/HR: at 05:44

## 2024-10-18 RX ADMIN — HEPARIN SODIUM 5000 UNIT(S): 10000 INJECTION INTRAVENOUS; SUBCUTANEOUS at 05:44

## 2024-10-18 RX ADMIN — IPRATROPIUM BROMIDE AND ALBUTEROL SULFATE 3 MILLILITER(S): .5; 2.5 SOLUTION RESPIRATORY (INHALATION) at 11:14

## 2024-10-18 RX ADMIN — MIDODRINE HYDROCHLORIDE 10 MILLIGRAM(S): 2.5 TABLET ORAL at 22:58

## 2024-10-18 RX ADMIN — Medication 2 TABLET(S): at 22:58

## 2024-10-18 RX ADMIN — CHLORHEXIDINE GLUCONATE 1 APPLICATION(S): 40 SOLUTION TOPICAL at 06:13

## 2024-10-18 RX ADMIN — MIDODRINE HYDROCHLORIDE 10 MILLIGRAM(S): 2.5 TABLET ORAL at 14:56

## 2024-10-18 RX ADMIN — Medication 2: at 23:03

## 2024-10-18 RX ADMIN — Medication 2.03 MICROGRAM(S)/KG/HR: at 19:22

## 2024-10-18 RX ADMIN — NOREPINEPHRINE BITARTRATE 4.19 MICROGRAM(S)/KG/MIN: 1 INJECTION, SOLUTION, CONCENTRATE INTRAVENOUS at 08:41

## 2024-10-18 RX ADMIN — Medication 15.2 MICROGRAM(S)/KG/MIN: at 19:22

## 2024-10-18 RX ADMIN — Medication 4: at 05:45

## 2024-10-18 RX ADMIN — IPRATROPIUM BROMIDE AND ALBUTEROL SULFATE 3 MILLILITER(S): .5; 2.5 SOLUTION RESPIRATORY (INHALATION) at 05:12

## 2024-10-18 RX ADMIN — Medication 300 MILLIGRAM(S): at 13:26

## 2024-10-18 NOTE — CONSULT NOTE ADULT - SUBJECTIVE AND OBJECTIVE BOX
[facility]  INFECTIOUS DISEASES   [address] [place] [address]  Tel: [phone]     Fax: [phone]  ==============================================================================  DO Artie Aquino MD Sehrish Shahid, MD Alisa Rivera, NP   ==============================================================================    JIMMY BLAND  MRN-[id number]  Male  73y ([birth year])        Patient is a 73y old  Male who presents with a chief complaint of Sepsis and acute hypoxic respiratory failure secondary to suspected aspiration PNA vs. HCAP (17 Oct 2024 23:21)      HPI:  Jimmy Bland is a 73 year old male with PMHx of HTN, IDDM2, ESRD on HD (M/W/F, through RUE AV fistula), hx of CVA x 2 (with residual R. sided weakness and dysphagia s/p PEG tube, previously with tracheostomy and now removed), and hx of RLE DVT (completed apixaban course) who presented to the ED on 10/4/24 for complaints of cough and chills. History obtained from wife at bedside. As per wife, patient typically coughs at baseline since he previously had a tracheostomy. However, for the past 2-3 days, he has been coughing more frequently and it has been productive with yellow phlegm. Wife was assisting him with getting dressed this afternoon around 1 PM when he felt nauseous and started complaining of abdominal pain. Then had a bowel movement. When wife was cleaning him up, patient was shaking "like he had chills" and she thinks his eyes rolled back for a few seconds. No loss of consciousness. EMS was called and another episode of shaking upon EMS arrival. Of note, patient is due for dialysis today but did not receive it since he came to the ER today. Baseline functional status is wheelchair bound and dependent with all ADLs. NPO with Nepro q4. Lives at home with wife. In the ED, Tmax 101.3, HR as elevated as 118, BP as elevated as 201/68, and SpO2 as low as 91% on room air. Initially placed on 15L NRB with improvement of SpO2 to 95%. Now on room air. WBC 17.38K, hgb 9.9, sodium 129, BUN 61, Cr 4.05, alkphos 169. Lactic acid 3.6. CT head without evidence of acute hemorrhage mass or mass effect but with encephalomalacia and gliosis again seen involving the bifrontal region. CT CAP with pneumonia and rectum distended with stool. Received  cc bolus, vancomycin 1 g IV, zosyn 3.375 g IV, acetaminophen 1 g IV, hydralazine 10 mg IV, pantoprazole 40 mg IV, and ondansetron 4 mg IV. Evaluated by nephrology who is planning for HD tomorrow. (04 Oct 2024 22:03)      ID consulted for workup and antibiotic management     PAST MEDICAL & SURGICAL HISTORY:  ESRD on hemodialysis  HTN (hypertension)  Insulin dependent type 2 diabetes mellitus  History of cerebrovascular accident (CVA) with residual deficit  History of DVT of lower extremity  Arteriovenous fistula  S/P percutaneous endoscopic gastrostomy (PEG) tube placement  History of tracheostomy      Allergies  No Known Allergies      ANTIMICROBIALS:  levoFLOXacin IVPB 500 every 48 hours      MEDICATIONS  (STANDING):  levoFLOXacin IVPB   50 mL/Hr IV Intermittent (10-16-24 @ 11:16)    piperacillin/tazobactam IVPB..   25 mL/Hr IV Intermittent (10-15-24 @ 10:30)   25 mL/Hr IV Intermittent (10-14-24 @ 21:24)   25 mL/Hr IV Intermittent (10-14-24 @ 11:58)   25 mL/Hr IV Intermittent (10-13-24 @ 21:36)   25 mL/Hr IV Intermittent (10-13-24 @ 09:44)   25 mL/Hr IV Intermittent (10-13-24 @ 00:23)   25 mL/Hr IV Intermittent (10-12-24 @ 09:39)   25 mL/Hr IV Intermittent (10-11-24 @ 21:33)   25 mL/Hr IV Intermittent (10-11-24 @ 13:12)   25 mL/Hr IV Intermittent (10-10-24 @ 22:54)   25 mL/Hr IV Intermittent (10-10-24 @ 11:31)   25 mL/Hr IV Intermittent (10-09-24 @ 22:33)   25 mL/Hr IV Intermittent (10-09-24 @ 11:20)   25 mL/Hr IV Intermittent (10-08-24 @ 21:58)    piperacillin/tazobactam IVPB..   25 mL/Hr IV Intermittent (10-08-24 @ 14:10)   25 mL/Hr IV Intermittent (10-08-24 @ 03:27)   25 mL/Hr IV Intermittent (10-07-24 @ 14:15)   25 mL/Hr IV Intermittent (10-07-24 @ 03:45)   25 mL/Hr IV Intermittent (10-06-24 @ 14:20)   25 mL/Hr IV Intermittent (10-06-24 @ 03:41)   25 mL/Hr IV Intermittent (10-05-24 @ 18:03)   25 mL/Hr IV Intermittent (10-05-24 @ 02:39)    piperacillin/tazobactam IVPB...   200 mL/Hr IV Intermittent (10-04-24 @ 18:32)    trimethoprim  160 mG/sulfamethoxazole 800 mG   1 Tablet(s) Oral (10-16-24 @ 06:38)   1 Tablet(s) Oral (10-15-24 @ 19:24)   1 Tablet(s) Oral (10-15-24 @ 05:11)   1 Tablet(s) Oral (10-14-24 @ 17:20)   1 Tablet(s) Oral (10-14-24 @ 05:39)   1 Tablet(s) Oral (10-13-24 @ 17:31)   1 Tablet(s) Oral (10-13-24 @ 06:00)    vancomycin  IVPB   250 mL/Hr IV Intermittent (10-08-24 @ 10:05)    vancomycin  IVPB.   250 mL/Hr IV Intermittent (10-04-24 @ 20:25)        OTHER MEDS: MEDICATIONS  (STANDING):  albuterol/ipratropium for Nebulization 3 every 6 hours  fentaNYL   Infusion. 0.5 <Continuous>  heparin   Injectable 5000 every 12 hours  insulin lispro (ADMELOG) corrective regimen sliding scale  every 6 hours  midodrine 10 every 8 hours  norepinephrine Infusion 0.055 <Continuous>  pantoprazole   Suspension 40 daily  polyethylene glycol 3350 17 daily  senna 2 at bedtime  sodium chloride 3%  Inhalation 4 every 6 hours      SOCIAL HISTORY:     Smoking Cigarettes [ ]Active [ ] Former [ ]Denies   ETOH [ ]denies [ ]Former [ ]Current Use denies   Drug Use [ ]Never [ ] Former [ ] Active     FAMILY HISTORY:  FHx: diabetes mellitus (Father, Aunt)        REVIEW OF SYSTEMS  [  ] ROS unobtainable because:    [  ] All other systems negative except as noted below:	    Constitutional:  [ ] fever [ ] chills  [ ] weight loss  [ ] weakness  Skin:  [ ] rash [ ] phlebitis	  Eyes: [ ] icterus [ ] pain  [ ] discharge	  ENMT: [ ] sore throat  [ ] thrush [ ] ulcers [ ] exudates  Respiratory: [ ] dyspnea [ ] hemoptysis [ ] cough [ ] sputum	  Cardiovascular:  [ ] chest pain [ ] palpitations [ ] edema	  Gastrointestinal:  [ ] nausea [ ] vomiting [ ] diarrhea [ ] constipation [ ] pain	  Genitourinary:  [ ] dysuria [ ] frequency [ ] hematuria [ ] discharge [ ] flank pain  [ ] incontinence  Musculoskeletal:  [ ] myalgias [ ] arthralgias [ ] arthritis  [ ] back pain  Neurological:  [ ] headache [ ] seizures  [ ] confusion/altered mental status  Psychiatric:  [ ] anxiety [ ] depression	  Hematology/Lymphatics:  [ ] lymphadenopathy  Endocrine:  [ ] adrenal [ ] thyroid  Allergic/Immunologic:	 [ ] transplant [ ] seasonal    Vital Signs Last 24 Hrs  T(F): 98.8 (10-18-24 @ 04:00), Max: 102.4 (10-11-24 @ 15:30)    Vital Signs Last 24 Hrs  HR: 80 (10-18-24 @ 11:11) (75 - 96)  BP: --  RR: 21 (10-18-24 @ 10:30)  SpO2: 100% (10-18-24 @ 11:11) (90% - 100%)  Wt(kg): --    PHYSICAL EXAM:  Constitutional: non-toxic, no distress  HEAD/EYES: anicteric, no conjunctival injection  ENT:  supple, no thrush  Cardiovascular:   normal S1, S2, no murmur, no edema  Respiratory:  clear BS bilaterally, no wheezes, no rales  GI:  soft, non-tender, normal bowel sounds  :  no belle, no CVA tenderness  Musculoskeletal:  no synovitis, normal ROM  Neurologic: awake and alert, normal strength, no focal findings  Skin:  no rash, no erythema, no phlebitis  Heme/Onc: no lymphadenopathy   Psychiatric:  awake, alert, appropriate mood        WBC  WBC Count: 34.17 K/uL (10-18 @ 03:20)  WBC Count: 18.41 K/uL (10-17 @ 03:40)  WBC Count: 22.03 K/uL (10-15 @ 02:00)  WBC Count: 37.26 K/uL (10-14 @ 03:05)  WBC Count: 34.18 K/uL (10-13 @ 05:18)  WBC Count: 27.18 K/uL (10-12 @ 10:34)  WBC Count: 25.95 K/uL (10-12 @ 02:30)      Auto Neutrophil %: 94.0 % *H* (10-18-24 @ 03:20)  Auto Neutrophil #: 32.10 K/uL *H* (10-18-24 @ 03:20)  Auto Neutrophil %: 90.3 % *H* (10-17-24 @ 03:40)  Auto Neutrophil #: 16.64 K/uL *H* (10-17-24 @ 03:40)  Auto Neutrophil %: 95.3 % *H* (10-14-24 @ 03:05)  Auto Neutrophil #: 35.48 K/uL *H* (10-14-24 @ 03:05)    CBC                        7.4    34.17 )-----------( 292      ( 18 Oct 2024 03:20 )             21.3     BMP/CMP  10-18    134[L]  |  97  |  120[H]  ----------------------------<  251[H]  5.4[H]   |  19[L]  |  7.91[H]    Ca    7.8[L]      18 Oct 2024 11:10  Phos  8.7     10-18  Mg     2.9     10-18    TPro  x   /  Alb  1.6[L]  /  TBili  x   /  DBili  x   /  AST  x   /  ALT  x   /  AlkPhos  x   10-18    Creatinine Trend  Creatinine Trend: 7.91<--, 7.34<--, 6.52<--, 4.66<--, 3.34<--, 2.44<--    Lipase: 43 U/L (10-18-24 @ 11:10)      Blood Gas Arterial, Lactate: 3.10 mmol/L (10-18-24 @ 08:40)            MICROBIOLOGY:  Bronchial  10-14-24   Culture is being performed.  --  Pseudomonas aeruginosa  Culture - Bronchial (10.14.24 @ 11:20)   -  Imipenem: S 2   -  Levofloxacin: S <=0.5   -  Meropenem: S <=1   -  Piperacillin/Tazobactam: R 64   Gram Stain:   Moderate polymorphonuclear leukocytes per low power field  Rare Squamous epithelial cells per low power field  Few Gram Negative Rods per oil power field   -  Cefepime: I 16   -  Aztreonam: I 16   -  Ceftazidime: R >16   -  Ciprofloxacin: S <=0.25      Bronchial  10-08-24   Few Pseudomonas aeruginosa  Moderate Stenotrophomonas maltophilia  --  Pseudomonas aeruginosa  Stenotrophomonas maltophilia      .Stool  10-08-24   No enteric pathogens isolated.  (Stool culture examined for Salmonella,  Shigella, Campylobacter, Aeromonas, Plesiomonas,  Vibrio, E.coli O157 and Yersinia)  --  --      .Blood BLOOD  10-07-24   No growth at 5 days  --  --      .Blood BLOOD  10-07-24   No growth at 5 days  --  --      .Blood BLOOD  10-04-24   No growth at 5 days  --  --      .Blood BLOOD  10-04-24   No growth at 5 days  --  --            SARS-CoV-2: NotDetec (11 Oct 2024 14:45)  SARS-CoV-2: NotDetec (08 Oct 2024 10:00)  COVID-19 PCR: Detected (09 Jul 2024 13:43)  COVID-19 PCR: NotDetec (07 Jul 2024 04:30)  SARS-CoV-2: NotDetec (29 Jun 2024 22:30)  COVID-19 PCR: NotDetec (14 Jun 2024 18:56)          Rapid RVP Result: NotDetec (10-11 @ 14:45)            RADIOLOGY:      ACC: [id number] EXAM:  XR CHEST PORTABLE URGENT 1V   ORDERED BY: ABDIRIZAK HEWITT     PROCEDURE DATE:  10/11/2024          INTERPRETATION:  HISTORY: ; ; hypoxia;  TECHNIQUE: Portable frontal view of the chest, 1 view.  COMPARISON: 10/8/2024.  FINDINGS/  IMPRESSION:  Right central line SVC. Left axillary line.  HEART: normal in size.  LUNGS: Stable opacity at the right base compatible with elevated   hemidiaphragm and effusion/infiltrate..  BONES: degenerative changes    --- End of Report---            TERRIE LAGOS MD  This document has been electronically signed. Oct 12 2024 12:09PM      ACC: [id number] EXAM:  XR CHEST PORTABLE URGENT 1V   ORDERED BY: SACHA DESIR     PROCEDURE DATE:  10/13/2024          INTERPRETATION:  Desaturation.    AP chest. Prior 10/11/2024.    IMPRESSION:  Right internal jugular line has been removed. Left axillary line   unchanged in position  Interval decrease in right pleural effusion with underlying   consolidation/atelectasis, slightly improved aeration  right lung. New   left basilar retrocardiac consolidation/atelectasis. No pneumothorax or   other interval change    --- End of Report ---            CHANA FRENCH MD; Attending Radiologist  This document has been electronically signed. Oct 14 2024  4:04PM      ACC: [id number] EXAM:  XR CHEST PORTABLE URGENT 1V   ORDERED BY: SEPIDEH REED     PROCEDURE DATE:  10/14/2024          INTERPRETATION:  AP chest on October 14, 2024 at 10:10 AM. Patient was   intubated.    Heart normal for projection.    Thereis a persistent retrocardiac infiltrate and right base infiltrate.   Right base infiltrate may be slightly better aerated than on October 13.    Endotracheal tube insertion.    NG tube is noted.    IMPRESSION: Persistent bibasilar infiltrates showingslightly better   aeration at the right base. Intubation.    --- End of Report ---            ITALIA ALY MD  This document has been electronically signed. Oct 14 2024  2:40PM    I have personally reviewed the above imaging  [facility]  INFECTIOUS DISEASES   [address] [place] [address]  Tel: [phone]     Fax: [phone]  ==============================================================================  DO Artie Aquino MD Sehrish Shahid, MD Alisa Rivera, NP   ==============================================================================    JIMMY BLAND  MRN-[id number]  Male  73y ([birth year])        Patient is a 73y old  Male who presents with a chief complaint of Sepsis and acute hypoxic respiratory failure secondary to suspected aspiration PNA vs. HCAP (17 Oct 2024 23:21)      HPI:  Jimmy Bland is a 73 year old male with PMHx of HTN, IDDM2, ESRD on HD (M/W/F, through RUE AV fistula), hx of CVA x 2 (with residual R. sided weakness and dysphagia s/p PEG tube, previously with tracheostomy and now removed), and hx of RLE DVT (completed apixaban course) who presented to the ED on 10/4/24 for complaints of cough and chills. History obtained from wife at bedside. As per wife, patient typically coughs at baseline since he previously had a tracheostomy. However, for the past 2-3 days, he has been coughing more frequently and it has been productive with yellow phlegm. Wife was assisting him with getting dressed this afternoon around 1 PM when he felt nauseous and started complaining of abdominal pain. Then had a bowel movement. When wife was cleaning him up, patient was shaking "like he had chills" and she thinks his eyes rolled back for a few seconds. No loss of consciousness. EMS was called and another episode of shaking upon EMS arrival. Of note, patient is due for dialysis today but did not receive it since he came to the ER today. Baseline functional status is wheelchair bound and dependent with all ADLs. NPO with Nepro q4. Lives at home with wife. In the ED, Tmax 101.3, HR as elevated as 118, BP as elevated as 201/68, and SpO2 as low as 91% on room air. Initially placed on 15L NRB with improvement of SpO2 to 95%. Now on room air. WBC 17.38K, hgb 9.9, sodium 129, BUN 61, Cr 4.05, alkphos 169. Lactic acid 3.6. CT head without evidence of acute hemorrhage mass or mass effect but with encephalomalacia and gliosis again seen involving the bifrontal region. CT CAP with pneumonia and rectum distended with stool. Received  cc bolus, vancomycin 1 g IV, zosyn 3.375 g IV, acetaminophen 1 g IV, hydralazine 10 mg IV, pantoprazole 40 mg IV, and ondansetron 4 mg IV. Evaluated by nephrology who is planning for HD tomorrow. (04 Oct 2024 22:03)      ID consulted for workup and antibiotic management     PAST MEDICAL & SURGICAL HISTORY:  ESRD on hemodialysis  HTN (hypertension)  Insulin dependent type 2 diabetes mellitus  History of cerebrovascular accident (CVA) with residual deficit  History of DVT of lower extremity  Arteriovenous fistula  S/P percutaneous endoscopic gastrostomy (PEG) tube placement  History of tracheostomy      Allergies  No Known Allergies      ANTIMICROBIALS:  levoFLOXacin IVPB 500 every 48 hours      MEDICATIONS  (STANDING):  levoFLOXacin IVPB   50 mL/Hr IV Intermittent (10-16-24 @ 11:16)    piperacillin/tazobactam IVPB..   25 mL/Hr IV Intermittent (10-15-24 @ 10:30)   25 mL/Hr IV Intermittent (10-14-24 @ 21:24)   25 mL/Hr IV Intermittent (10-14-24 @ 11:58)   25 mL/Hr IV Intermittent (10-13-24 @ 21:36)   25 mL/Hr IV Intermittent (10-13-24 @ 09:44)   25 mL/Hr IV Intermittent (10-13-24 @ 00:23)   25 mL/Hr IV Intermittent (10-12-24 @ 09:39)   25 mL/Hr IV Intermittent (10-11-24 @ 21:33)   25 mL/Hr IV Intermittent (10-11-24 @ 13:12)   25 mL/Hr IV Intermittent (10-10-24 @ 22:54)   25 mL/Hr IV Intermittent (10-10-24 @ 11:31)   25 mL/Hr IV Intermittent (10-09-24 @ 22:33)   25 mL/Hr IV Intermittent (10-09-24 @ 11:20)   25 mL/Hr IV Intermittent (10-08-24 @ 21:58)    piperacillin/tazobactam IVPB..   25 mL/Hr IV Intermittent (10-08-24 @ 14:10)   25 mL/Hr IV Intermittent (10-08-24 @ 03:27)   25 mL/Hr IV Intermittent (10-07-24 @ 14:15)   25 mL/Hr IV Intermittent (10-07-24 @ 03:45)   25 mL/Hr IV Intermittent (10-06-24 @ 14:20)   25 mL/Hr IV Intermittent (10-06-24 @ 03:41)   25 mL/Hr IV Intermittent (10-05-24 @ 18:03)   25 mL/Hr IV Intermittent (10-05-24 @ 02:39)    piperacillin/tazobactam IVPB...   200 mL/Hr IV Intermittent (10-04-24 @ 18:32)    trimethoprim  160 mG/sulfamethoxazole 800 mG   1 Tablet(s) Oral (10-16-24 @ 06:38)   1 Tablet(s) Oral (10-15-24 @ 19:24)   1 Tablet(s) Oral (10-15-24 @ 05:11)   1 Tablet(s) Oral (10-14-24 @ 17:20)   1 Tablet(s) Oral (10-14-24 @ 05:39)   1 Tablet(s) Oral (10-13-24 @ 17:31)   1 Tablet(s) Oral (10-13-24 @ 06:00)    vancomycin  IVPB   250 mL/Hr IV Intermittent (10-08-24 @ 10:05)    vancomycin  IVPB.   250 mL/Hr IV Intermittent (10-04-24 @ 20:25)        OTHER MEDS: MEDICATIONS  (STANDING):  albuterol/ipratropium for Nebulization 3 every 6 hours  fentaNYL   Infusion. 0.5 <Continuous>  heparin   Injectable 5000 every 12 hours  insulin lispro (ADMELOG) corrective regimen sliding scale  every 6 hours  midodrine 10 every 8 hours  norepinephrine Infusion 0.055 <Continuous>  pantoprazole   Suspension 40 daily  polyethylene glycol 3350 17 daily  senna 2 at bedtime  sodium chloride 3%  Inhalation 4 every 6 hours      SOCIAL HISTORY:     Smoking Cigarettes [ ]Active [ ] Former [ ]Denies   ETOH [ ]denies [ ]Former [ ]Current Use denies   Drug Use [ ]Never [ ] Former [ ] Active     FAMILY HISTORY:  FHx: diabetes mellitus (Father, Aunt)        REVIEW OF SYSTEMS  [  ] ROS unobtainable because:    [  ] All other systems negative except as noted below:	    Constitutional:  [ ] fever [ ] chills  [ ] weight loss  [ ] weakness  Skin:  [ ] rash [ ] phlebitis	  Eyes: [ ] icterus [ ] pain  [ ] discharge	  ENMT: [ ] sore throat  [ ] thrush [ ] ulcers [ ] exudates  Respiratory: [ ] dyspnea [ ] hemoptysis [ ] cough [ ] sputum	  Cardiovascular:  [ ] chest pain [ ] palpitations [ ] edema	  Gastrointestinal:  [ ] nausea [ ] vomiting [ ] diarrhea [ ] constipation [ ] pain	  Genitourinary:  [ ] dysuria [ ] frequency [ ] hematuria [ ] discharge [ ] flank pain  [ ] incontinence  Musculoskeletal:  [ ] myalgias [ ] arthralgias [ ] arthritis  [ ] back pain  Neurological:  [ ] headache [ ] seizures  [ ] confusion/altered mental status  Psychiatric:  [ ] anxiety [ ] depression	  Hematology/Lymphatics:  [ ] lymphadenopathy  Endocrine:  [ ] adrenal [ ] thyroid  Allergic/Immunologic:	 [ ] transplant [ ] seasonal    Vital Signs Last 24 Hrs  T(F): 98.8 (10-18-24 @ 04:00), Max: 102.4 (10-11-24 @ 15:30)    Vital Signs Last 24 Hrs  HR: 80 (10-18-24 @ 11:11) (75 - 96)  BP: --  RR: 21 (10-18-24 @ 10:30)  SpO2: 100% (10-18-24 @ 11:11) (90% - 100%)  Wt(kg): --    PHYSICAL EXAM:  Constitutional: non-toxic, no distress  HEAD/EYES: anicteric, no conjunctival injection  ENT:  supple, no thrush  Cardiovascular:   normal S1, S2, no murmur, no edema  Respiratory:  clear BS bilaterally, no wheezes, no rales  GI:  soft, non-tender, normal bowel sounds  :  no belle, no CVA tenderness  Musculoskeletal:  no synovitis, normal ROM  Neurologic: awake and alert, normal strength, no focal findings  Skin:  no rash, no erythema, no phlebitis  Heme/Onc: no lymphadenopathy   Psychiatric:  awake, alert, appropriate mood        WBC  WBC Count: 34.17 K/uL (10-18 @ 03:20)  WBC Count: 18.41 K/uL (10-17 @ 03:40)  WBC Count: 22.03 K/uL (10-15 @ 02:00)  WBC Count: 37.26 K/uL (10-14 @ 03:05)  WBC Count: 34.18 K/uL (10-13 @ 05:18)  WBC Count: 27.18 K/uL (10-12 @ 10:34)  WBC Count: 25.95 K/uL (10-12 @ 02:30)      Auto Neutrophil %: 94.0 % *H* (10-18-24 @ 03:20)  Auto Neutrophil #: 32.10 K/uL *H* (10-18-24 @ 03:20)  Auto Neutrophil %: 90.3 % *H* (10-17-24 @ 03:40)  Auto Neutrophil #: 16.64 K/uL *H* (10-17-24 @ 03:40)  Auto Neutrophil %: 95.3 % *H* (10-14-24 @ 03:05)  Auto Neutrophil #: 35.48 K/uL *H* (10-14-24 @ 03:05)    CBC                        7.4    34.17 )-----------( 292      ( 18 Oct 2024 03:20 )             21.3     BMP/CMP  10-18    134[L]  |  97  |  120[H]  ----------------------------<  251[H]  5.4[H]   |  19[L]  |  7.91[H]    Ca    7.8[L]      18 Oct 2024 11:10  Phos  8.7     10-18  Mg     2.9     10-18    TPro  x   /  Alb  1.6[L]  /  TBili  x   /  DBili  x   /  AST  x   /  ALT  x   /  AlkPhos  x   10-18    Creatinine Trend  Creatinine Trend: 7.91<--, 7.34<--, 6.52<--, 4.66<--, 3.34<--, 2.44<--    Lipase: 43 U/L (10-18-24 @ 11:10)      Blood Gas Arterial, Lactate: 3.10 mmol/L (10-18-24 @ 08:40)            MICROBIOLOGY:  Bronchial  10-14-24   Culture is being performed.  --  Pseudomonas aeruginosa  Culture - Bronchial (10.14.24 @ 11:20)   -  Imipenem: S 2   -  Levofloxacin: S <=0.5   -  Meropenem: S <=1   -  Piperacillin/Tazobactam: R 64   -  Cefepime: I 16   -  Aztreonam: I 16   -  Ceftazidime: R >16   -  Ciprofloxacin: S <=0.25      Bronchial  10-08-24   Few Pseudomonas aeruginosa  Moderate Stenotrophomonas maltophilia  --  Pseudomonas aeruginosa  Stenotrophomonas maltophilia      .Stool  10-08-24   No enteric pathogens isolated.  (Stool culture examined for Salmonella,  Shigella, Campylobacter, Aeromonas, Plesiomonas,  Vibrio, E.coli O157 and Yersinia)  --  --      .Blood BLOOD  10-07-24   No growth at 5 days  --  --      .Blood BLOOD  10-07-24   No growth at 5 days  --  --      .Blood BLOOD  10-04-24   No growth at 5 days  --  --      .Blood BLOOD  10-04-24   No growth at 5 days  --  --            SARS-CoV-2: NotDetec (11 Oct 2024 14:45)  SARS-CoV-2: NotDetec (08 Oct 2024 10:00)  COVID-19 PCR: Detected (09 Jul 2024 13:43)  COVID-19 PCR: NotDetec (07 Jul 2024 04:30)  SARS-CoV-2: NotDetec (29 Jun 2024 22:30)  COVID-19 PCR: NotDetec (14 Jun 2024 18:56)          Rapid RVP Result: NotDetec (10-11 @ 14:45)            RADIOLOGY:      ACC: [id number] EXAM:  XR CHEST PORTABLE URGENT 1V   ORDERED BY: ABDIRIZAK HEWITT     PROCEDURE DATE:  10/11/2024          INTERPRETATION:  HISTORY: ; ; hypoxia;  TECHNIQUE: Portable frontal view of the chest, 1 view.  COMPARISON: 10/8/2024.  FINDINGS/  IMPRESSION:  Right central line SVC. Left axillary line.  HEART: normal in size.  LUNGS: Stable opacity at the right base compatible with elevated   hemidiaphragm and effusion/infiltrate..  BONES: degenerative changes    --- End of Report---            TERRIE LAGOS MD  This document has been electronically signed. Oct 12 2024 12:09PM      ACC: [id number] EXAM:  XR CHEST PORTABLE URGENT 1V   ORDERED BY: SACHA DESIR     PROCEDURE DATE:  10/13/2024          INTERPRETATION:  Desaturation.    AP chest. Prior 10/11/2024.    IMPRESSION:  Right internal jugular line has been removed. Left axillary line   unchanged in position  Interval decrease in right pleural effusion with underlying   consolidation/atelectasis, slightly improved aeration  right lung. New   left basilar retrocardiac consolidation/atelectasis. No pneumothorax or   other interval change    --- End of Report ---            CHANA FRENCH MD; Attending Radiologist  This document has been electronically signed. Oct 14 2024  4:04PM      ACC: [id number] EXAM:  XR CHEST PORTABLE URGENT 1V   ORDERED BY: SEPIDEH REED     PROCEDURE DATE:  10/14/2024          INTERPRETATION:  AP chest on October 14, 2024 at 10:10 AM. Patient was   intubated.    Heart normal for projection.    Thereis a persistent retrocardiac infiltrate and right base infiltrate.   Right base infiltrate may be slightly better aerated than on October 13.    Endotracheal tube insertion.    NG tube is noted.    IMPRESSION: Persistent bibasilar infiltrates showingslightly better   aeration at the right base. Intubation.    --- End of Report ---            ITALIA ALY MD  This document has been electronically signed. Oct 14 2024  2:40PM    I have personally reviewed the above imaging

## 2024-10-18 NOTE — PROGRESS NOTE ADULT - ASSESSMENT
This is a 73 year old man with history of HTN, IDDM2, ESRD on HD (M/W/F, through RUE AV fistula), hx of CVA x 2 (with residual R sided weakness and dysphagia s/p PEG tube, previously with tracheostomy and now removed), and hx of RLE DVT (completed apixaban course) who presented to the ED on 10/4/24 for complaints of cough and chills. Baseline functional status is wheelchair bound and dependent with all ADLs. NPO with Nepro q4. Lives at home with wife. Initially placed on 15L NRB with improvement of SpO2 to 95%. Then changed to high flow nasal cannula. Desaturated on 10/8, and switched to BiPAP  Patient was admitted in July to the hospital and received 7 days of antibiotics   He grew citrobacter that was quite sensitive but could have more resistant organisms given the hospitalization   additionally he is a hemodialysis patient getting hemodialysis 3 days a week and has a degree of being immunocompromised     10/9: intubated, sedated, getting hemodialysis again today, continue high dose zosyn while awaiting cultures, cultures negative thus far, s/p bronch and will follow those cultures and adjust antibiotics accordingly   10/10: Afebrile, Intubated, sedated. Noted with Hgb of 5.9 this am. Improved tracheal secretions per RN. On minimal vent settings, possible extubation trial today. HD tomorrow per renal. No growth on blood cultures. Bronch culture with Pseudomonas aeruginosa, pending susceptibilities.  10/11: extubated but wheezing, mucous poor blood gas, aggressive PT, awaiting pseudomonas susceptibilities    10/18:     Impression:  1. Multifocal pneumonia  2. Acute respiratory failure due to hypoxia    3. ESRD on HD  4. IDDM2       Plan:    -Wean oxygen and aggressive pulmonary toilet  -Good glycemic control and avoid hypoglycemia  -Continue hemodialysis as per renal     Discussed with ICU    Sandra Donaldson MD  Attending Physician  Division of Infectious Diseases   Available via Microsoft Teams   Megha Art is a 73 year old male with PMHx of HTN, IDDM2, ESRD on HD (M/W/F, through RUE AV fistula), hx of CVA x 2 (with residual R. sided weakness and dysphagia s/p PEG tube, previously with tracheostomy and now removed), and hx of RLE DVT (completed apixaban course) who presented to the ED on 10/4/24 for complaints of cough and chills. In the ED, Tmax 101.3, HR as elevated as 118, BP as elevated as 201/68, and SpO2 as low as 91% on room air. Initially placed on 15L NRB with improvement of SpO2 to 95%. Now on room air. WBC 17.38K, hgb 9.9, sodium 129, BUN 61, Cr 4.05, alkphos 169. Lactic acid 3.6. CT head without evidence of acute hemorrhage mass or mass effect but with encephalomalacia and gliosis again seen involving the bifrontal region. CT CAP with pneumonia and rectum distended with stool. Received  cc bolus, vancomycin 1 g IV, zosyn 3.375 g IV, acetaminophen 1 g IV, hydralazine 10 mg IV, pantoprazole 40 mg IV, and ondansetron 4 mg IV. Evaluated by nephrology who is planning for HD tomorrow. (04 Oct 2024 22:03)    Hospital course has been complicated with long ICU stay, failed trial of extubation 10/11, reintubation on 10/14. He is s/p bronch x 2. BAL with PsAg and Stenotrophomonas. He was on Pip-deb from 10/4-->10/15. Switched to Levofloxacin IV on 10/16.  ID was re-called today for antibiotics management.      10/9: intubated, sedated, getting hemodialysis again today, continue high dose zosyn while awaiting cultures, cultures negative thus far, s/p bronch and will follow those cultures and adjust antibiotics accordingly   10/10: Afebrile, Intubated, sedated. Noted with Hgb of 5.9 this am. Improved tracheal secretions per RN. On minimal vent settings, possible extubation trial today. HD tomorrow per renal. No growth on blood cultures. Bronch culture with Pseudomonas aeruginosa, pending susceptibilities.  10/11: extubated but wheezing, mucous plug, aggressive PT, awaiting pseudomonas susceptibilities    10/18: Afebrile today. T-max 100.6 F - 100.9 F last 2 days. Remains intubated, on minimal sedation, and one vasopressor. Basurto an episode of desaturation. He is on 90%FiO2. On HD today. No excessive oral secretions or diarrhea as per bedside RN. PsAg and Steno susceptibilities reviewed. MRSA screen negative on 10/10      Impression:  1. Multifocal pneumonia- Dense RLL consolidation  2. Acute respiratory failure requiring intubation  3. Septic shock requiring vasopressor support  4. Rising leukocytosis and low grade fevers  5. ESRD on HD. Prior CVA, s/p PEG, Prior trach and decannulation, IDDM2       Recommendations:  -Continue levofloxacin 500 mg IV q48 hr   -Would consider adding vancomycin after HD for MRSA coverage  -No clear indication for anti fungal and anaerobic coverage  -Wean ventilatory support per protocol  -Wean off pressors and sedation per ICU  -Off loading, frequent turns, nutritional optimization  -Continue hemodialysis as per renal       Discussed with ICU    Sandra Donaldson MD  Attending Physician  Division of Infectious Diseases   Available via [employer] Teams

## 2024-10-18 NOTE — PROGRESS NOTE ADULT - SUBJECTIVE AND OBJECTIVE BOX
HPI:  Megha Art is a 73 year old male with PMHx of HTN, IDDM2, ESRD on HD (M/W/F, through RUE AV fistula), hx of CVA x 2 (with residual R. sided weakness and dysphagia s/p PEG tube, previously with tracheostomy and now removed), and hx of RLE DVT (completed apixaban course) who presented to the ED on 10/4/24 for complaints of cough and chills.    History obtained from wife at bedside. As per wife, patient typically coughs at baseline since he previously had a tracheostomy. However, for the past 2-3 days, he has been coughing more frequently and it has been productive with yellow phlegm. Wife was assisting him with getting dressed this afternoon around 1 PM when he felt nauseous and started complaining of abdominal pain. Then had a bowel movement. When wife was cleaning him up, patient was shaking "like he had chills" and she thinks his eyes rolled back for a few seconds. No loss of consciousness. EMS was called and another episode of shaking upon EMS arrival. Of note, patient is due for dialysis today but did not receive it since he came to the ER today. Baseline functional status is wheelchair bound and dependent with all ADLs. NPO with Nepro q4. Lives at home with wife.     In the ED, Tmax 101.3, HR as elevated as 118, BP as elevated as 201/68, and SpO2 as low as 91% on room air. Initially placed on 15L NRB with improvement of SpO2 to 95%. Now on room air. WBC 17.38K, hgb 9.9, sodium 129, BUN 61, Cr 4.05, alkphos 169. Lactic acid 3.6. CT head without evidence of acute hemorrhage mass or mass effect but with encephalomalacia and gliosis again seen involving the bifrontal region. CT CAP with pneumonia and rectum distended with stool. Received  cc bolus, vancomycin 1 g IV, zosyn 3.375 g IV, acetaminophen 1 g IV, hydralazine 10 mg IV, pantoprazole 40 mg IV, and ondansetron 4 mg IV. Evaluated by nephrology who is planning for HD tomorrow. (04 Oct 2024 22:03)      24 hr events:  Had another episode of desaturation this AM requiring increase in fio2 to 90%    Plat 17 and peak 27    Not significant amount of secretions on suctioning    ## Labs:  CBC:                        7.4    34.17 )-----------( 292      ( 18 Oct 2024 03:20 )             21.3     Chem:  10-18    134[L]  |  97  |  120[H]  ----------------------------<  251[H]  5.4[H]   |  19[L]  |  7.91[H]    Ca    7.8[L]      18 Oct 2024 11:10  Phos  9.3     10-18  Mg     2.9     10-18    TPro  6.7  /  Alb  1.6[L]  /  TBili  0.5  /  DBili  x   /  AST  5369[H]  /  ALT  1912[H]  /  AlkPhos  149[H]  10-18         ## Medications:  levoFLOXacin IVPB 500 milliGRAM(s) IV Intermittent every 48 hours    midodrine 10 milliGRAM(s) Oral every 8 hours  norepinephrine Infusion 0.055 MICROgram(s)/kG/Min IV Continuous <Continuous>    albuterol/ipratropium for Nebulization 3 milliLiter(s) Nebulizer every 6 hours  sodium chloride 3%  Inhalation 4 milliLiter(s) Inhalation every 6 hours    insulin lispro (ADMELOG) corrective regimen sliding scale   SubCutaneous every 6 hours    heparin   Injectable 5000 Unit(s) SubCutaneous every 12 hours    pantoprazole   Suspension 40 milliGRAM(s) Enteral Tube daily  polyethylene glycol 3350 17 Gram(s) Oral daily  senna 2 Tablet(s) Oral at bedtime    fentaNYL   Infusion. 0.5 MICROgram(s)/kG/Hr IV Continuous <Continuous>      ## Vitals:  T(C): 37.2 (10-18-24 @ 12:00), Max: 37.2 (10-18-24 @ 12:00)  HR: 71 (10-18-24 @ 12:30) (71 - 96)  BP: --  BP(mean): --  RR: 20 (10-18-24 @ 12:30) (15 - 32)  SpO2: 100% (10-18-24 @ 12:07) (90% - 100%)  Wt(kg): --  Vent: Mode: AC/ CMV (Assist Control/ Continuous Mandatory Ventilation), RR (machine): 16, RR (patient): 21, TV (machine): 400, FiO2: 90, PEEP: 8, PIP: 24  ABG: ABG - ( 18 Oct 2024 08:40 )  pH, Arterial: 7.22  pH, Blood: x     /  pCO2: 44    /  pO2: 67    / HCO3: 18    / Base Excess: -9.1  /  SaO2: 89.7          10-17 @ 07:01  -  10-18 @ 07:00  --------------------------------------------------------  IN: 519 mL / OUT: 0 mL / NET: 519 mL    10-18 @ 07:01  -  10-18 @ 13:36  --------------------------------------------------------  IN: 148.7 mL / OUT: 0 mL / NET: 148.7 mL          ## P/E:  Gen: lying comfortably in bed in no apparent distress  Mouth:   Neck:  Lungs:   Heart:   Abd:  Ext:  Neuro:     < from: Xray Chest 1 View- PORTABLE-Urgent (Xray Chest 1 View- PORTABLE-Urgent .) (10.14.24 @ 11:36) >    ACC: [id number] EXAM:  XR CHEST PORTABLE URGENT 1V   ORDERED BY: SEPIDEH REED     PROCEDURE DATE:  10/14/2024          INTERPRETATION:  AP chest on October 14, 2024 at 10:10 AM. Patient was   intubated.    Heart normal for projection.    Thereis a persistent retrocardiac infiltrate and right base infiltrate.   Right base infiltrate may be slightly better aerated than on October 13.    Endotracheal tube insertion.    NG tube is noted.    IMPRESSION: Persistent bibasilar infiltrates showingslightly better   aeration at the right base. Intubation.    --- End of Report ---            ITALIA ALY MD  This document has been electronically signed. Oct 14 2024  2:40PM    < end of copied text >       HPI:  Megha Art is a 73 year old male with PMHx of HTN, IDDM2, ESRD on HD (M/W/F, through RUE AV fistula), hx of CVA x 2 (with residual R. sided weakness and dysphagia s/p PEG tube, previously with tracheostomy and now removed), and hx of RLE DVT (completed apixaban course) who presented to the ED on 10/4/24 for complaints of cough and chills.    History obtained from wife at bedside. As per wife, patient typically coughs at baseline since he previously had a tracheostomy. However, for the past 2-3 days, he has been coughing more frequently and it has been productive with yellow phlegm. Wife was assisting him with getting dressed this afternoon around 1 PM when he felt nauseous and started complaining of abdominal pain. Then had a bowel movement. When wife was cleaning him up, patient was shaking "like he had chills" and she thinks his eyes rolled back for a few seconds. No loss of consciousness. EMS was called and another episode of shaking upon EMS arrival. Of note, patient is due for dialysis today but did not receive it since he came to the ER today. Baseline functional status is wheelchair bound and dependent with all ADLs. NPO with Nepro q4. Lives at home with wife.     In the ED, Tmax 101.3, HR as elevated as 118, BP as elevated as 201/68, and SpO2 as low as 91% on room air. Initially placed on 15L NRB with improvement of SpO2 to 95%. Now on room air. WBC 17.38K, hgb 9.9, sodium 129, BUN 61, Cr 4.05, alkphos 169. Lactic acid 3.6. CT head without evidence of acute hemorrhage mass or mass effect but with encephalomalacia and gliosis again seen involving the bifrontal region. CT CAP with pneumonia and rectum distended with stool. Received  cc bolus, vancomycin 1 g IV, zosyn 3.375 g IV, acetaminophen 1 g IV, hydralazine 10 mg IV, pantoprazole 40 mg IV, and ondansetron 4 mg IV. Evaluated by nephrology who is planning for HD tomorrow. (04 Oct 2024 22:03)      24 hr events:  Had another episode of desaturation this AM requiring increase in fio2 to 90%    Plat 17 and peak 27    Not significant amount of secretions on suctioning    ## Labs:  CBC:                        7.4    34.17 )-----------( 292      ( 18 Oct 2024 03:20 )             21.3     Chem:  10-18    134[L]  |  97  |  120[H]  ----------------------------<  251[H]  5.4[H]   |  19[L]  |  7.91[H]    Ca    7.8[L]      18 Oct 2024 11:10  Phos  9.3     10-18  Mg     2.9     10-18    TPro  6.7  /  Alb  1.6[L]  /  TBili  0.5  /  DBili  x   /  AST  5369[H]  /  ALT  1912[H]  /  AlkPhos  149[H]  10-18         ## Medications:  levoFLOXacin IVPB 500 milliGRAM(s) IV Intermittent every 48 hours    midodrine 10 milliGRAM(s) Oral every 8 hours  norepinephrine Infusion 0.055 MICROgram(s)/kG/Min IV Continuous <Continuous>    albuterol/ipratropium for Nebulization 3 milliLiter(s) Nebulizer every 6 hours  sodium chloride 3%  Inhalation 4 milliLiter(s) Inhalation every 6 hours    insulin lispro (ADMELOG) corrective regimen sliding scale   SubCutaneous every 6 hours    heparin   Injectable 5000 Unit(s) SubCutaneous every 12 hours    pantoprazole   Suspension 40 milliGRAM(s) Enteral Tube daily  polyethylene glycol 3350 17 Gram(s) Oral daily  senna 2 Tablet(s) Oral at bedtime    fentaNYL   Infusion. 0.5 MICROgram(s)/kG/Hr IV Continuous <Continuous>      ## Vitals:  T(C): 37.2 (10-18-24 @ 12:00), Max: 37.2 (10-18-24 @ 12:00)  HR: 71 (10-18-24 @ 12:30) (71 - 96)  BP: --  BP(mean): --  RR: 20 (10-18-24 @ 12:30) (15 - 32)  SpO2: 100% (10-18-24 @ 12:07) (90% - 100%)  Wt(kg): --  Vent: Mode: AC/ CMV (Assist Control/ Continuous Mandatory Ventilation), RR (machine): 16, RR (patient): 21, TV (machine): 400, FiO2: 90, PEEP: 8, PIP: 24  ABG: ABG - ( 18 Oct 2024 08:40 )  pH, Arterial: 7.22  pH, Blood: x     /  pCO2: 44    /  pO2: 67    / HCO3: 18    / Base Excess: -9.1  /  SaO2: 89.7          10-17 @ 07:01  -  10-18 @ 07:00  --------------------------------------------------------  IN: 519 mL / OUT: 0 mL / NET: 519 mL    10-18 @ 07:01  -  10-18 @ 13:36  --------------------------------------------------------  IN: 148.7 mL / OUT: 0 mL / NET: 148.7 mL    PHYSICAL EXAM:  GENERAL: NAD   ENT: ET Tube in place   CHEST/LUNG: Clear to auscultation bilaterally; No rales, rhonchi, wheezing, or rubs  HEART: Regular rate and rhythm; No murmurs, rubs, or gallops  ABDOMEN: Soft, Nontender, Nondistended; Bowel sounds present  VASCULAR:  2+ Peripheral Pulses, No clubbing, cyanosis, or edema  LYMPH: No lymphadenopathy noted  SKIN: No rashes or lesions  NERVOUS SYSTEM:  Sedated but awake and alert today, intermittently appears to be trying to follow        < from: Xray Chest 1 View- PORTABLE-Urgent (Xray Chest 1 View- PORTABLE-Urgent .) (10.14.24 @ 11:36) >    ACC: [id number] EXAM:  XR CHEST PORTABLE URGENT 1V   ORDERED BY: SEPIDEH REED     PROCEDURE DATE:  10/14/2024          INTERPRETATION:  AP chest on October 14, 2024 at 10:10 AM. Patient was   intubated.    Heart normal for projection.    Thereis a persistent retrocardiac infiltrate and right base infiltrate.   Right base infiltrate may be slightly better aerated than on October 13.    Endotracheal tube insertion.    NG tube is noted.    IMPRESSION: Persistent bibasilar infiltrates showingslightly better   aeration at the right base. Intubation.    --- End of Report ---            ITALIA ALY MD  This document has been electronically signed. Oct 14 2024  2:40PM    < end of copied text >

## 2024-10-18 NOTE — PROGRESS NOTE ADULT - SUBJECTIVE AND OBJECTIVE BOX
[facility] NEPHROLOGY SERVICES, [facility]  NEPHROLOGY AND HYPERTENSION  [address]  [address]  [place] [address]  [phone]    MD RONNA CHANG MD YELENA ROSENBERG, MD BINNY KOSHY, MD CHRISTOPHER CAPUTO, MD EDWARD BOVER, MD          Patient events noted  Increasing WBC;   LFTs    MEDICATIONS  (STANDING):  albuterol/ipratropium for Nebulization 3 milliLiter(s) Nebulizer every 6 hours  chlorhexidine 0.12% Liquid 15 milliLiter(s) Oral Mucosa every 12 hours  chlorhexidine 2% Cloths 1 Application(s) Topical <User Schedule>  fentaNYL   Infusion. 0.5 MICROgram(s)/kG/Hr (2.03 mL/Hr) IV Continuous <Continuous>  ferrous    sulfate Liquid 300 milliGRAM(s) Enteral Tube daily  heparin   Injectable 5000 Unit(s) SubCutaneous every 12 hours  insulin lispro (ADMELOG) corrective regimen sliding scale   SubCutaneous every 6 hours  levoFLOXacin IVPB 500 milliGRAM(s) IV Intermittent every 48 hours  midodrine 10 milliGRAM(s) Oral every 8 hours  Nephro-noam 1 Tablet(s) Oral daily  norepinephrine Infusion 0.055 MICROgram(s)/kG/Min (4.19 mL/Hr) IV Continuous <Continuous>  pantoprazole   Suspension 40 milliGRAM(s) Enteral Tube daily  polyethylene glycol 3350 17 Gram(s) Oral daily  senna 2 Tablet(s) Oral at bedtime  sodium chloride 3%  Inhalation 4 milliLiter(s) Inhalation every 6 hours  sodium zirconium cyclosilicate 10 Gram(s) Oral once    MEDICATIONS  (PRN):      10-17-24 @ 07:01  -  10-18-24 @ 07:00  --------------------------------------------------------  IN: 519 mL / OUT: 0 mL / NET: 519 mL    10-18-24 @ 07:01  -  10-18-24 @ 12:17  --------------------------------------------------------  IN: 70.6 mL / OUT: 0 mL / NET: 70.6 mL      PHYSICAL EXAM:      T(C): 37.1 (10-18-24 @ 04:00), Max: 37.1 (10-18-24 @ 04:00)  HR: 72 (10-18-24 @ 12:07) (72 - 96)  BP: --  RR: 21 (10-18-24 @ 10:30) (18 - 32)  SpO2: 100% (10-18-24 @ 12:07) (90% - 100%)  Wt(kg): --  Lungs clear ant decreased BS at right base  Heart S1S2  Abd soft NT ND  Extremities:   tr edema                                    7.4    34.17 )-----------( 292      ( 18 Oct 2024 03:20 )             21.3     10-18    134[L]  |  97  |  120[H]  ----------------------------<  251[H]  5.4[H]   |  19[L]  |  7.91[H]    Ca    7.8[L]      18 Oct 2024 11:10  Phos  8.7     10-18  Mg     2.9     10-18    TPro  x   /  Alb  1.6[L]  /  TBili  x   /  DBili  x   /  AST  x   /  ALT  x   /  AlkPhos  x   10-18    ABG - ( 18 Oct 2024 08:40 )  pH, Arterial: 7.22  pH, Blood: x     /  pCO2: 44    /  pO2: 67    / HCO3: 18    / Base Excess: -9.1  /  SaO2: 89.7              LIVER FUNCTIONS - ( 18 Oct 2024 11:10 )  Alb: 1.6 g/dL / Pro: x     / ALK PHOS: x     / ALT: x     / AST: x     / GGT: x           Creatinine Trend: 7.91<--, 7.34<--, 6.52<--, 4.66<--, 3.34<--, 2.44<--  Mode: AC/ CMV (Assist Control/ Continuous Mandatory Ventilation)  RR (machine): 16  TV (machine): 400  FiO2: 90  PEEP: 8  ITime: 0.9  MAP: 11  PIP: 24        < from: US Abdomen Upper Quadrant Right (10.18.24 @ 09:24) >  FINDINGS:  Liver: Within normal limits.  Bile ducts: Normal caliber. Common bile duct measures 3 mm.  Gallbladder: Within normal limits.  Pancreas: Visualized portions are within normal limits.  Right kidney: 8.4 cm. No hydronephrosis. Mildly atrophic.  Ascites: None.  IVC: Visualized portions are within normal limits.  Other: Trace right pleural effusion.    IMPRESSION:  Etiology of elevated LFTs not elucidated.    Trace right pleural effusion.    < end of copied text >        < from: Xray Chest 1 View- PORTABLE-Urgent (Xray Chest 1 View- PORTABLE-Urgent .) (10.14.24 @ 11:36) >  IMPRESSION: Persistent bibasilar infiltrates showingslightly better   aeration at the right base. Intubation.    < end of copied text >    Assessment   ESRD, chronic aspiration, respiratory failure   Increasing WBC and LFTs trend     Plan:  Will resume maintenance HD today and tomorrow  UF as tolerated   Overall prognosis remains poor  Discussed with wife and critical care team, goals of care ongoing.     Delonte Moya MD

## 2024-10-18 NOTE — PROGRESS NOTE ADULT - SUBJECTIVE AND OBJECTIVE BOX
[facility]  INFECTIOUS DISEASES   [address] [place] [address]  Tel: [phone]     Fax: [phone]  ==============================================================================  DO Artie Aquino MD Alexandra Gutman, NP   ==============================================================================      JIMMY BLAND  MRN-[id number]  73y ([birth year])      Interval History: patient seen and examined in ICU.     ROS      Allergies  No Known Allergies      ANTIMICROBIALS:    piperacillin/tazobactam IVPB.. 4.5 every 12 hours      MEDICATIONS  (STANDING):  albuterol/ipratropium for Nebulization 3 every 6 hours  amLODIPine   Tablet 10 daily  aspirin  chewable 81 daily  atorvastatin 20 at bedtime  diltiazem    Tablet 30 every 8 hours  heparin   Injectable 5000 every 8 hours  hydrALAZINE 50 every 8 hours  insulin glargine Injectable (LANTUS) 7 at bedtime  insulin lispro (ADMELOG) corrective regimen sliding scale  every 6 hours  nitroglycerin  Infusion 50 <Continuous>  pantoprazole   Suspension 40 daily  polyethylene glycol 3350 17 daily  sodium chloride 3%  Inhalation 4 every 6 hours      PRN  hydrALAZINE Injectable 10 milliGRAM(s) IV Push every 4 hours PRN      Physical Exam:  Vital Signs Last 24 Hrs  T(C): 37.2 (18 Oct 2024 12:00), Max: 37.2 (18 Oct 2024 12:00)  T(F): 99 (18 Oct 2024 12:00), Max: 99 (18 Oct 2024 12:00)  HR: 71 (18 Oct 2024 12:30) (71 - 96)  BP: --  BP(mean): --  RR: 20 (18 Oct 2024 12:30) (15 - 32)  SpO2: 100% (18 Oct 2024 12:07) (90% - 100%)      General:    NAD, extubated and on nasal cannula green tube   Head: atraumatic, normocephalic  Eye: normal sclera and conjunctiva  ENT:    no oral lesions, neck supple, + IJ CVC c.d.i  Cardio:     regular S1, S2,  no murmur  Respiratory:    diminished BS b/l,    wheezing today ETT has been removed   abd:     soft,   BS +,   no tenderness, + peg   :   no CVAT,  no suprapubic tenderness,    Musculoskeletal:   no joint swelling,   no edema  vascular: +central lines, +PIV, RUE AV fistula   Skin:    no rash  Neurologic:     awake, calm, pleasant   psych: normal affect      CBC  WBC Count: 34.17 K/uL (10-18 @ 03:20)  WBC Count: 18.41 K/uL (10-17 @ 03:40)  WBC Count: 22.03 K/uL (10-15 @ 02:00)  WBC Count: 37.26 K/uL (10-14 @ 03:05)  WBC Count: 34.18 K/uL (10-13 @ 05:18)  WBC Count: 27.18 K/uL (10-12 @ 10:34)  WBC Count: 25.95 K/uL (10-12 @ 02:30)                          7.4    34.17 )-----------( 292      ( 18 Oct 2024 03:20 )             21.3     10-18    134[L]  |  97  |  120[H]  ----------------------------<  251[H]  5.4[H]   |  19[L]  |  7.91[H]    Ca    7.8[L]      18 Oct 2024 11:10  Phos  9.3     10-18  Mg     2.9     10-18    TPro  6.7  /  Alb  1.6[L]  /  TBili  0.5  /  DBili  x   /  AST  5369[H]  /  ALT  1912[H]  /  AlkPhos  149[H]  10-18    Creatinine: 7.91 (10-18)  Creatinine: 7.34 (10-18)  Creatinine: 6.52 (10-17)  Creatinine: 4.66 (10-15)  Creatinine: 3.34 (10-14)  Creatinine: 2.44 (10-13)  Creatinine: 3.12 (10-12)    Lactate, Blood: 2.5 mmol/L (10-18 @ 11:10)      MICROBIOLOGY:  Bronchial  10-14-24   Culture is being performed.  --  Pseudomonas aeruginosa  Culture - Bronchial (10.14.24 @ 11:20)   -  Meropenem: S <=1   -  Piperacillin/Tazobactam: R 64   -  Ceftazidime: R >16   -  Ciprofloxacin: S <=0.25   -  Imipenem: S 2   -  Levofloxacin: S <=0.5   -  Cefepime: I 16   -  Aztreonam: I 16        Bronchial  10-08-24   Few Pseudomonas aeruginosa  Moderate Stenotrophomonas maltophilia  --  Pseudomonas aeruginosa  Stenotrophomonas maltophilia          .Stool  10-08-24   No enteric pathogens isolated.  (Stool culture examined for Salmonella,  Shigella, Campylobacter, Aeromonas, Plesiomonas,  Vibrio, E.coli O157 and Yersinia)  --  --      .Blood BLOOD  10-07-24   No growth at 5 days  --  --      .Blood BLOOD  10-07-24   No growth at 5 days  --  --      .Blood BLOOD  10-04-24   No growth at 5 days  --  --      .Blood BLOOD  10-04-24   No growth at 5 days  --  --        Rapid RVP Result: NotDetec (10-08 @ 10:00)    Procalcitonin: 27.40 (10-07-24 @ 18:35)  Procalcitonin: 22.50 (10-05-24 @ 08:37)    Rapid RVP Result: NotDetec (10-08-24 @ 10:00)  SARS-CoV-2: NotDetec (10-08-24 @ 10:00)        RADIOLOGY:  < from: US Abdomen Upper Quadrant Right (10.18.24 @ 09:24) >    ACC: [id number] EXAM:  US ABDOMEN RT UPR QUADRANT   ORDERED BY: ESEQUIEL BLUM DATE:  10/18/2024          INTERPRETATION:  CLINICAL INFORMATION: Elevated LFTs.    COMPARISON: None available.    TECHNIQUE: Sonography of the right upperquadrant.    FINDINGS:  Liver: Within normal limits.  Bile ducts: Normal caliber. Common bile duct measures 3 mm.  Gallbladder: Within normal limits.  Pancreas: Visualized portions are within normal limits.  Right kidney: 8.4 cm. No hydronephrosis. Mildly atrophic.  Ascites: None.  IVC: Visualized portions are within normal limits.  Other: Trace right pleural effusion.    IMPRESSION:  Etiology of elevated LFTs not elucidated.    Trace right pleural effusion.    --- End of Report ---            MONALISA ESCOBEDO MD  This document has been electronically signed. Oct 18 2024  9:48AM    < end of copied text >      < from: Xray Chest 1 View- PORTABLE-Urgent (Xray Chest 1 View- PORTABLE-Urgent .) (10.14.24 @ 11:36) >    ACC: [id number] EXAM:  XR CHEST PORTABLE URGENT 1V   ORDERED BY: SEPIDEH REED     PROCEDURE DATE:  10/14/2024          INTERPRETATION:  AP chest on October 14, 2024 at 10:10 AM. Patient was   intubated.    Heart normal for projection.    Thereis a persistent retrocardiac infiltrate and right base infiltrate.   Right base infiltrate may be slightly better aerated than on October 13.    Endotracheal tube insertion.    NG tube is noted.    IMPRESSION: Persistent bibasilar infiltrates showingslightly better   aeration at the right base. Intubation.    --- End of Report ---            ITALIA ALY MD  This document has been electronically signed. Oct 14 2024  2:40PM    < end of copied text >    ACC: [id number] EXAM:  XR CHEST PORTABLE URGENT 1V   ORDERED BY: DARIANA HOPKINS     PROCEDURE DATE:  10/08/2024          INTERPRETATION:  Portable AP chest radiograph    COMPARISON: 10/8/2024 and 10/7/2024 chest and CT the chest imaging..    CLINICAL INFORMATION: Line placement.    FINDINGS:  CATHETERS AND TUBES: RIGHT IJ catheter tip in SVC.    PULMONARY: Elevated RIGHT hemidiaphragm Elevated RIGHT hemidiaphragm with   RIGHT perihilar/lower zone airspace disease.  LEFT lung parenchyma clear.      HEART/VASCULAR: The heart size and mediastinum configuration are within   the limits of normal.    BONES: The visualized osseous thorax is intact.    IMPRESSION: RIGHT IJ catheter tip in SVC.  Elevated RIGHT hemidiaphragm with RIGHT perihilar/lower zone airspace   disease.  LEFT lung parenchyma clear.        ITALIA MICHELLE MD  This document has been electronically signed. Oct  9 2024  1:19PM      ACC: [id number] EXAM:  XR CHEST PORTABLE URGENT 1V   ORDERED BY: PRASAD SPENCER     PROCEDURE DATE:  10/08/2024          INTERPRETATION:  Portable AP chest radiograph    COMPARISON: 10/4/2024 chest x-ray.    CLINICAL INFORMATION: Status post bronchoscopy. RIGHT lower lobe   pneumonia..    FINDINGS:  CATHETERS AND TUBES: ET tube tip above tracheal bifurcation.    PULMONARY:  Residual RIGHT perihilar/lower zone and diffuse airspace disease .  LEFT lung parenchyma clear.  No pneumothorax.      HEART/VASCULAR: The heart size and mediastinum configuration are within   the limits of normal.    BONES: The visualized osseous thorax is intact.    IMPRESSION:  ET tube tip above the bifurcation.  Residual RIGHT perihilar/lower zone diffuse airspace disease .      ITALIA MICHELLE MD  This document has been electronically signed. Oct  9 2024  1:55PM   [facility]  INFECTIOUS DISEASES   [address] [place] [address]  Tel: [phone]     Fax: [phone]  ==============================================================================  DO Artie Aquino MD Alexandra Gutman, NP   ==============================================================================      JIMMY BLAND  MRN-[id number]  73y ([birth year])    Follow up: pneumonia    Interval History: ID was re-called today for antibiotics management.    Subjective: Patient seen and examined in ICU. Family (wife and daughter present). Afebrile today. T-max 100.6- 100.9 last 2 days. Remains intubated, on minimal sedation, and one vasopressor. He is on 90%FiO2. On HD today. No excessive oral secretions or diarrhea as per bedside RN    ROS  Unable to obtain due to sedation    Allergies  No Known Allergies      ANTIMICROBIALS:    MEDICATIONS  (STANDING):  levoFLOXacin IVPB   50 mL/Hr IV Intermittent (10-16-24 @ 11:16)    piperacillin/tazobactam IVPB..   25 mL/Hr IV Intermittent (10-08-24 @ 14:10)   25 mL/Hr IV Intermittent (10-08-24 @ 03:27)   25 mL/Hr IV Intermittent (10-07-24 @ 14:15)   25 mL/Hr IV Intermittent (10-07-24 @ 03:45)   25 mL/Hr IV Intermittent (10-06-24 @ 14:20)   25 mL/Hr IV Intermittent (10-06-24 @ 03:41)   25 mL/Hr IV Intermittent (10-05-24 @ 18:03)   25 mL/Hr IV Intermittent (10-05-24 @ 02:39)    piperacillin/tazobactam IVPB..   25 mL/Hr IV Intermittent (10-15-24 @ 10:30)   25 mL/Hr IV Intermittent (10-14-24 @ 21:24)   25 mL/Hr IV Intermittent (10-14-24 @ 11:58)   25 mL/Hr IV Intermittent (10-13-24 @ 21:36)   25 mL/Hr IV Intermittent (10-13-24 @ 09:44)   25 mL/Hr IV Intermittent (10-13-24 @ 00:23)   25 mL/Hr IV Intermittent (10-12-24 @ 09:39)   25 mL/Hr IV Intermittent (10-11-24 @ 21:33)   25 mL/Hr IV Intermittent (10-11-24 @ 13:12)   25 mL/Hr IV Intermittent (10-10-24 @ 22:54)   25 mL/Hr IV Intermittent (10-10-24 @ 11:31)   25 mL/Hr IV Intermittent (10-09-24 @ 22:33)   25 mL/Hr IV Intermittent (10-09-24 @ 11:20)   25 mL/Hr IV Intermittent (10-08-24 @ 21:58)    piperacillin/tazobactam IVPB...   200 mL/Hr IV Intermittent (10-04-24 @ 18:32)    trimethoprim  160 mG/sulfamethoxazole 800 mG   1 Tablet(s) Oral (10-16-24 @ 06:38)   1 Tablet(s) Oral (10-15-24 @ 19:24)   1 Tablet(s) Oral (10-15-24 @ 05:11)   1 Tablet(s) Oral (10-14-24 @ 17:20)   1 Tablet(s) Oral (10-14-24 @ 05:39)   1 Tablet(s) Oral (10-13-24 @ 17:31)   1 Tablet(s) Oral (10-13-24 @ 06:00)    vancomycin  IVPB   250 mL/Hr IV Intermittent (10-08-24 @ 10:05)    vancomycin  IVPB.   250 mL/Hr IV Intermittent (10-04-24 @ 20:25)      Physical Exam:  Vital Signs Last 24 Hrs  T(C): 36.4 (18 Oct 2024 16:45), Max: 37.1 (18 Oct 2024 04:00)  T(F): 97.6 (18 Oct 2024 16:45), Max: 98.8 (18 Oct 2024 04:00)  HR: 81 (18 Oct 2024 17:00) (71 - 144)  BP: --  BP(mean): --  RR: 12 (18 Oct 2024 17:00) (12 - 32)  SpO2: 100% (18 Oct 2024 16:50) (90% - 100%)      General: Chronic ill appearing elderly man  HEENT: +ETT, + IJ CVC c.d.i  Cardio:  regular S1, S2,  no murmur  Respiratory:  Breath sounds coarse bilaterally, on vent  abd:  soft,   BS +,   no tenderness, + peg    Musculoskeletal:   no joint swelling,   no edema  Lines: +PIV, RUE AV fistula , rectal tube  Skin: warm dry skin  Neurologic: Sedated. Opens eyes, not tracking, not following commands      CBC  WBC Count: 34.17 K/uL (10-18 @ 03:20)  WBC Count: 18.41 K/uL (10-17 @ 03:40)  WBC Count: 22.03 K/uL (10-15 @ 02:00)  WBC Count: 37.26 K/uL (10-14 @ 03:05)  WBC Count: 34.18 K/uL (10-13 @ 05:18)  WBC Count: 27.18 K/uL (10-12 @ 10:34)  WBC Count: 25.95 K/uL (10-12 @ 02:30)                          7.4    34.17 )-----------( 292      ( 18 Oct 2024 03:20 )             21.3     10-18    134[L]  |  97  |  120[H]  ----------------------------<  251[H]  5.4[H]   |  19[L]  |  7.91[H]    Ca    7.8[L]      18 Oct 2024 11:10  Phos  9.3     10-18  Mg     2.9     10-18    TPro  6.7  /  Alb  1.6[L]  /  TBili  0.5  /  DBili  x   /  AST  5369[H]  /  ALT  1912[H]  /  AlkPhos  149[H]  10-18    Creatinine: 7.91 (10-18)  Creatinine: 7.34 (10-18)  Creatinine: 6.52 (10-17)  Creatinine: 4.66 (10-15)  Creatinine: 3.34 (10-14)  Creatinine: 2.44 (10-13)  Creatinine: 3.12 (10-12)    Lactate, Blood: 2.5 mmol/L (10-18 @ 11:10)      MICROBIOLOGY:  Bronchial  10-14-24   Culture is being performed.  --  Pseudomonas aeruginosa  Culture - Bronchial (10.14.24 @ 11:20)   -  Meropenem: S <=1   -  Piperacillin/Tazobactam: R 64   -  Ceftazidime: R >16   -  Ciprofloxacin: S <=0.25   -  Imipenem: S 2   -  Levofloxacin: S <=0.5   -  Cefepime: I 16   -  Aztreonam: I 16        Bronchial  10-08-24   Few Pseudomonas aeruginosa  Moderate Stenotrophomonas maltophilia  --  Pseudomonas aeruginosa  Stenotrophomonas maltophilia          .Stool  10-08-24   No enteric pathogens isolated.  (Stool culture examined for Salmonella,  Shigella, Campylobacter, Aeromonas, Plesiomonas,  Vibrio, E.coli O157 and Yersinia)  --  --      .Blood BLOOD  10-07-24   No growth at 5 days  --  --      .Blood BLOOD  10-07-24   No growth at 5 days  --  --      .Blood BLOOD  10-04-24   No growth at 5 days  --  --      .Blood BLOOD  10-04-24   No growth at 5 days  --  --        Rapid RVP Result: NotDetec (10-08 @ 10:00)    Procalcitonin: 27.40 (10-07-24 @ 18:35)  Procalcitonin: 22.50 (10-05-24 @ 08:37)    Procalcitonin (10.18.24 @ 11:10)    Procalcitonin: 10.10: Note: Concentrations <0.5 ng/mL do not exclude an infection, on account  of localized infections (without systemic signs) which can be associated  with such low concentrations, or a systemic infection in its initial  stages (<6 hours). Furthermore, increased procalcitonin may occur without  infection. PCT concentrations between 0.5 and 2.0 ng/mL should be  interpreted taking into account the patient’s history. It is recommended  to retest PCT within 6-24 hours if any concentrations <2.0 mg/mL are  obtained. ng/mL      Rapid RVP Result: NotDetec (10-08-24 @ 10:00)  SARS-CoV-2: NotDetec (10-08-24 @ 10:00)    MRSA/MSSA PCR (10.09.24 @ 13:50)    MRSA PCR Result.: NotDetec: The results of this test should be interpreted with consideration of  clinical context.  Not Detected result indicates the absence of organisms or that the number  of organisms is below the assay limit of detection.  Detected result indicates the presence of organism nucleic acid.  Indeterminate result may indicate the presence of amplification  inhibitors in specimen; presence or absence of organisms cannot be  determined. Consider collecting new specimen if further testing is still  needed.  This qualitative PCR assay is FDA-approved, and its performance was  established by [employer], [place], [place].   Staph aureus PCR Result: NotDetec        RADIOLOGY:  < from: US Abdomen Upper Quadrant Right (10.18.24 @ 09:24) >    ACC: [id number] EXAM:  US ABDOMEN RT UPR QUADRANT   ORDERED BY: ESEQUIEL RIVERA     PROCEDURE DATE:  10/18/2024          INTERPRETATION:  CLINICAL INFORMATION: Elevated LFTs.    COMPARISON: None available.    TECHNIQUE: Sonography of the right upperquadrant.    FINDINGS:  Liver: Within normal limits.  Bile ducts: Normal caliber. Common bile duct measures 3 mm.  Gallbladder: Within normal limits.  Pancreas: Visualized portions are within normal limits.  Right kidney: 8.4 cm. No hydronephrosis. Mildly atrophic.  Ascites: None.  IVC: Visualized portions are within normal limits.  Other: Trace right pleural effusion.    IMPRESSION:  Etiology of elevated LFTs not elucidated.    Trace right pleural effusion.    --- End of Report ---            MONALISA ESCOBEDO MD  This document has been electronically signed. Oct 18 2024  9:48AM    < end of copied text >      < from: Xray Chest 1 View- PORTABLE-Urgent (Xray Chest 1 View- PORTABLE-Urgent .) (10.14.24 @ 11:36) >    ACC: [id number] EXAM:  XR CHEST PORTABLE URGENT 1V   ORDERED BY: SEPIDEH REED     PROCEDURE DATE:  10/14/2024          INTERPRETATION:  AP chest on October 14, 2024 at 10:10 AM. Patient was   intubated.    Heart normal for projection.    Thereis a persistent retrocardiac infiltrate and right base infiltrate.   Right base infiltrate may be slightly better aerated than on October 13.    Endotracheal tube insertion.    NG tube is noted.    IMPRESSION: Persistent bibasilar infiltrates showingslightly better   aeration at the right base. Intubation.    --- End of Report ---            ITALIA ALY MD  This document has been electronically signed. Oct 14 2024  2:40PM    < end of copied text >    ACC: [id number] EXAM:  XR CHEST PORTABLE URGENT 1V   ORDERED BY: DARIANA HOPKINS     PROCEDURE DATE:  10/08/2024          INTERPRETATION:  Portable AP chest radiograph    COMPARISON: 10/8/2024 and 10/7/2024 chest and CT the chest imaging..    CLINICAL INFORMATION: Line placement.    FINDINGS:  CATHETERS AND TUBES: RIGHT IJ catheter tip in SVC.    PULMONARY: Elevated RIGHT hemidiaphragm Elevated RIGHT hemidiaphragm with   RIGHT perihilar/lower zone airspace disease.  LEFT lung parenchyma clear.      HEART/VASCULAR: The heart size and mediastinum configuration are within   the limits of normal.    BONES: The visualized osseous thorax is intact.    IMPRESSION: RIGHT IJ catheter tip in SVC.  Elevated RIGHT hemidiaphragm with RIGHT perihilar/lower zone airspace   disease.  LEFT lung parenchyma clear.        ITALIA MICHELLE MD  This document has been electronically signed. Oct  9 2024  1:19PM      ACC: [id number] EXAM:  XR CHEST PORTABLE URGENT 1V   ORDERED BY: PRASAD SPENCER     PROCEDURE DATE:  10/08/2024          INTERPRETATION:  Portable AP chest radiograph    COMPARISON: 10/4/2024 chest x-ray.    CLINICAL INFORMATION: Status post bronchoscopy. RIGHT lower lobe   pneumonia..    FINDINGS:  CATHETERS AND TUBES: ET tube tip above tracheal bifurcation.    PULMONARY:  Residual RIGHT perihilar/lower zone and diffuse airspace disease .  LEFT lung parenchyma clear.  No pneumothorax.      HEART/VASCULAR: The heart size and mediastinum configuration are within   the limits of normal.    BONES: The visualized osseous thorax is intact.    IMPRESSION:  ET tube tip above the bifurcation.  Residual RIGHT perihilar/lower zone diffuse airspace disease .      ITALIA MICHELLE MD  This document has been electronically signed. Oct  9 2024  1:55PM

## 2024-10-18 NOTE — PROGRESS NOTE ADULT - ASSESSMENT
74 yo m pmhx HTN, DM2, ESRD on HD (MWF, RUE AV Fistula), CVA with residual R sided weakness and dsyphagia s/p peg, s/p trach since decannulated, RLE DVT (completed course of Eliquis) admitted with RLL PNA with course c/b acute hypoxic respiratory failure ultimately requiring intubation, failed trial of extubation and was subsequently reintubated 10/14, distributive shock.       POCUS today with dense RLL consolidations B lines R side only, b/l lung sliding, no pleff, normal LV fxn, normal RV size/fxn and no pericardial effusion    NEURO: sedated on propofol, daily awakening as tolerated    CV: distributive shock requiring vasopressor therapy, actively titrating levophed for map >65, midodrine as adjunctive therapy    RESP: ac/vc 4-6 cc/kg tv lung protective strategies, actively titrating fio2/peep for spo2 >92%, weaning settings as tolerated.  inh bronchodilators/3% saline for secretions  cxr and POCUS today both show cont RLL consolidations  improved w/ Left side down   cont aggressive airway cleaerance  RENAL: f/u renal reccs, cont HD    GI: cont tf as tolerated, bowel regimen, ppi  severe worsening of LFTs c/f shock liver given worsening hemodynamically as well  ruq US negative    ENDO: ISS for glycemic control   ID: Levaquin for pseudomonas and Stenotrophomonas in the sputum, given worsening condition will d/w ID re broadening abx for tx of steno and PsA    HEME: heparin for vte ppx     72 yo m pmhx HTN, DM2, ESRD on HD (MWF, RUE AV Fistula), CVA with residual R sided weakness and dsyphagia s/p peg, s/p trach since decannulated, RLE DVT (completed course of Eliquis) admitted with RLL PNA with course c/b acute hypoxic respiratory failure ultimately requiring intubation, failed trial of extubation and was subsequently reintubated 10/14, distributive shock.       POCUS today with dense RLL consolidations B lines R side only, b/l lung sliding, no pleff, normal LV fxn, normal RV size/fxn and no pericardial effusion    NEURO: sedated on propofol, daily awakening as tolerated    CV: distributive shock requiring vasopressor therapy, actively titrating levophed for map >65, midodrine as adjunctive therapy    RESP: ac/vc 4-6 cc/kg tv lung protective strategies, actively titrating fio2/peep for spo2 >92%, weaning settings as tolerated.  inh bronchodilators/3% saline for secretions  cxr and POCUS today both show cont RLL consolidations  improved w/ Left side down   cont aggressive airway clearance  already s/p bronch showing thick secretions   RENAL: f/u renal reccs, cont HD    GI: cont tf as tolerated, bowel regimen, ppi  severe worsening of LFTs c/f shock liver given worsening hemodynamically as well  ruq US negative    ENDO: ISS for glycemic control   ID: Levaquin for pseudomonas and Stenotrophomonas in the sputum, given worsening condition will d/w ID re broadening abx for tx of steno and PsA    HEME: heparin for vte ppx

## 2024-10-19 LAB
ALBUMIN SERPL ELPH-MCNC: 1.5 G/DL — LOW (ref 3.3–5)
ALP SERPL-CCNC: 159 U/L — HIGH (ref 40–120)
ALT FLD-CCNC: 1681 U/L — HIGH (ref 12–78)
ANION GAP SERPL CALC-SCNC: 15 MMOL/L — SIGNIFICANT CHANGE UP (ref 5–17)
AST SERPL-CCNC: 2878 U/L — HIGH (ref 15–37)
BASE EXCESS BLDA CALC-SCNC: -0.7 MMOL/L — SIGNIFICANT CHANGE UP (ref -2–3)
BASOPHILS # BLD AUTO: 0.03 K/UL — SIGNIFICANT CHANGE UP (ref 0–0.2)
BASOPHILS NFR BLD AUTO: 0.1 % — SIGNIFICANT CHANGE UP (ref 0–2)
BILIRUB SERPL-MCNC: 0.4 MG/DL — SIGNIFICANT CHANGE UP (ref 0.2–1.2)
BLOOD GAS COMMENTS ARTERIAL: SIGNIFICANT CHANGE UP
BUN SERPL-MCNC: 65 MG/DL — HIGH (ref 7–23)
CALCIUM SERPL-MCNC: 7.3 MG/DL — LOW (ref 8.5–10.1)
CHLORIDE SERPL-SCNC: 100 MMOL/L — SIGNIFICANT CHANGE UP (ref 96–108)
CO2 BLDA-SCNC: 25 MMOL/L — HIGH (ref 19–24)
CO2 SERPL-SCNC: 21 MMOL/L — LOW (ref 22–31)
CREAT SERPL-MCNC: 4.69 MG/DL — HIGH (ref 0.5–1.3)
EGFR: 13 ML/MIN/1.73M2 — LOW
EOSINOPHIL # BLD AUTO: 0.13 K/UL — SIGNIFICANT CHANGE UP (ref 0–0.5)
EOSINOPHIL NFR BLD AUTO: 0.6 % — SIGNIFICANT CHANGE UP (ref 0–6)
GAS PNL BLDA: SIGNIFICANT CHANGE UP
GLUCOSE BLDC GLUCOMTR-MCNC: 199 MG/DL — HIGH (ref 70–99)
GLUCOSE BLDC GLUCOMTR-MCNC: 222 MG/DL — HIGH (ref 70–99)
GLUCOSE BLDC GLUCOMTR-MCNC: 243 MG/DL — HIGH (ref 70–99)
GLUCOSE SERPL-MCNC: 158 MG/DL — HIGH (ref 70–99)
HCO3 BLDA-SCNC: 24 MMOL/L — SIGNIFICANT CHANGE UP (ref 21–28)
HCT VFR BLD CALC: 22.7 % — LOW (ref 39–50)
HGB BLD-MCNC: 7.8 G/DL — LOW (ref 13–17)
HOROWITZ INDEX BLDA+IHG-RTO: 90 — SIGNIFICANT CHANGE UP
IMM GRANULOCYTES NFR BLD AUTO: 0.8 % — SIGNIFICANT CHANGE UP (ref 0–0.9)
LIDOCAIN IGE QN: 33 U/L — SIGNIFICANT CHANGE UP (ref 13–75)
LYMPHOCYTES # BLD AUTO: 0.56 K/UL — LOW (ref 1–3.3)
LYMPHOCYTES # BLD AUTO: 2.5 % — LOW (ref 13–44)
MAGNESIUM SERPL-MCNC: 2.3 MG/DL — SIGNIFICANT CHANGE UP (ref 1.6–2.6)
MCHC RBC-ENTMCNC: 23.6 PG — LOW (ref 27–34)
MCHC RBC-ENTMCNC: 34.4 G/DL — SIGNIFICANT CHANGE UP (ref 32–36)
MCV RBC AUTO: 68.8 FL — LOW (ref 80–100)
MONOCYTES # BLD AUTO: 0.51 K/UL — SIGNIFICANT CHANGE UP (ref 0–0.9)
MONOCYTES NFR BLD AUTO: 2.3 % — SIGNIFICANT CHANGE UP (ref 2–14)
NEUTROPHILS # BLD AUTO: 21.08 K/UL — HIGH (ref 1.8–7.4)
NEUTROPHILS NFR BLD AUTO: 93.7 % — HIGH (ref 43–77)
NRBC # BLD: 0 /100 WBCS — SIGNIFICANT CHANGE UP (ref 0–0)
PCO2 BLDA: 40 MMHG — SIGNIFICANT CHANGE UP (ref 32–46)
PH BLDA: 7.39 — SIGNIFICANT CHANGE UP (ref 7.35–7.45)
PHOSPHATE SERPL-MCNC: 5.7 MG/DL — HIGH (ref 2.5–4.5)
PLATELET # BLD AUTO: 246 K/UL — SIGNIFICANT CHANGE UP (ref 150–400)
PO2 BLDA: 113 MMHG — HIGH (ref 83–108)
POTASSIUM SERPL-MCNC: 3.9 MMOL/L — SIGNIFICANT CHANGE UP (ref 3.5–5.3)
POTASSIUM SERPL-SCNC: 3.9 MMOL/L — SIGNIFICANT CHANGE UP (ref 3.5–5.3)
PROT SERPL-MCNC: 6.4 GM/DL — SIGNIFICANT CHANGE UP (ref 6–8.3)
RBC # BLD: 3.3 M/UL — LOW (ref 4.2–5.8)
RBC # FLD: 23.9 % — HIGH (ref 10.3–14.5)
SAO2 % BLDA: 98.6 % — HIGH (ref 94–98)
SODIUM SERPL-SCNC: 136 MMOL/L — SIGNIFICANT CHANGE UP (ref 135–145)
WBC # BLD: 22.5 K/UL — HIGH (ref 3.8–10.5)
WBC # FLD AUTO: 22.5 K/UL — HIGH (ref 3.8–10.5)

## 2024-10-19 PROCEDURE — 99231 SBSQ HOSP IP/OBS SF/LOW 25: CPT

## 2024-10-19 PROCEDURE — 99291 CRITICAL CARE FIRST HOUR: CPT

## 2024-10-19 RX ORDER — ONDANSETRON HYDROCHLORIDE 2 MG/ML
4 INJECTION, SOLUTION INTRAMUSCULAR; INTRAVENOUS ONCE
Refills: 0 | Status: COMPLETED | OUTPATIENT
Start: 2024-10-19 | End: 2024-10-19

## 2024-10-19 RX ADMIN — MIDODRINE HYDROCHLORIDE 10 MILLIGRAM(S): 2.5 TABLET ORAL at 05:05

## 2024-10-19 RX ADMIN — Medication 1 TABLET(S): at 11:46

## 2024-10-19 RX ADMIN — Medication 4: at 17:57

## 2024-10-19 RX ADMIN — HEPARIN SODIUM 5000 UNIT(S): 10000 INJECTION INTRAVENOUS; SUBCUTANEOUS at 17:57

## 2024-10-19 RX ADMIN — HEPARIN SODIUM 5000 UNIT(S): 10000 INJECTION INTRAVENOUS; SUBCUTANEOUS at 05:05

## 2024-10-19 RX ADMIN — Medication 15.2 MICROGRAM(S)/KG/MIN: at 11:49

## 2024-10-19 RX ADMIN — POLYETHYLENE GLYCOL 3350 17 GRAM(S): 17 POWDER, FOR SOLUTION ORAL at 11:47

## 2024-10-19 RX ADMIN — Medication 2.03 MICROGRAM(S)/KG/HR: at 22:04

## 2024-10-19 RX ADMIN — ONDANSETRON HYDROCHLORIDE 4 MILLIGRAM(S): 2 INJECTION, SOLUTION INTRAMUSCULAR; INTRAVENOUS at 17:57

## 2024-10-19 RX ADMIN — SODIUM CHLORIDE 4 MILLILITER(S): 9 INJECTION, SOLUTION INTRAMUSCULAR; INTRAVENOUS; SUBCUTANEOUS at 05:44

## 2024-10-19 RX ADMIN — IPRATROPIUM BROMIDE AND ALBUTEROL SULFATE 3 MILLILITER(S): .5; 2.5 SOLUTION RESPIRATORY (INHALATION) at 17:21

## 2024-10-19 RX ADMIN — CHLORHEXIDINE GLUCONATE 15 MILLILITER(S): 40 SOLUTION TOPICAL at 17:57

## 2024-10-19 RX ADMIN — MIDODRINE HYDROCHLORIDE 10 MILLIGRAM(S): 2.5 TABLET ORAL at 22:04

## 2024-10-19 RX ADMIN — Medication 300 MILLIGRAM(S): at 11:47

## 2024-10-19 RX ADMIN — CHLORHEXIDINE GLUCONATE 1 APPLICATION(S): 40 SOLUTION TOPICAL at 05:06

## 2024-10-19 RX ADMIN — Medication 15.2 MICROGRAM(S)/KG/MIN: at 22:04

## 2024-10-19 RX ADMIN — SODIUM CHLORIDE 4 MILLILITER(S): 9 INJECTION, SOLUTION INTRAMUSCULAR; INTRAVENOUS; SUBCUTANEOUS at 11:02

## 2024-10-19 RX ADMIN — Medication 15.2 MICROGRAM(S)/KG/MIN: at 07:28

## 2024-10-19 RX ADMIN — Medication 2 TABLET(S): at 22:05

## 2024-10-19 RX ADMIN — PANTOPRAZOLE SODIUM 40 MILLIGRAM(S): 40 TABLET, DELAYED RELEASE ORAL at 11:46

## 2024-10-19 RX ADMIN — Medication 4: at 11:47

## 2024-10-19 RX ADMIN — Medication 2: at 05:09

## 2024-10-19 RX ADMIN — Medication 2.03 MICROGRAM(S)/KG/HR: at 18:16

## 2024-10-19 RX ADMIN — IPRATROPIUM BROMIDE AND ALBUTEROL SULFATE 3 MILLILITER(S): .5; 2.5 SOLUTION RESPIRATORY (INHALATION) at 11:02

## 2024-10-19 RX ADMIN — SODIUM CHLORIDE 4 MILLILITER(S): 9 INJECTION, SOLUTION INTRAMUSCULAR; INTRAVENOUS; SUBCUTANEOUS at 00:41

## 2024-10-19 RX ADMIN — IPRATROPIUM BROMIDE AND ALBUTEROL SULFATE 3 MILLILITER(S): .5; 2.5 SOLUTION RESPIRATORY (INHALATION) at 00:41

## 2024-10-19 RX ADMIN — SODIUM CHLORIDE 4 MILLILITER(S): 9 INJECTION, SOLUTION INTRAMUSCULAR; INTRAVENOUS; SUBCUTANEOUS at 17:21

## 2024-10-19 RX ADMIN — CHLORHEXIDINE GLUCONATE 15 MILLILITER(S): 40 SOLUTION TOPICAL at 05:05

## 2024-10-19 RX ADMIN — MIDODRINE HYDROCHLORIDE 10 MILLIGRAM(S): 2.5 TABLET ORAL at 15:38

## 2024-10-19 RX ADMIN — Medication 2.03 MICROGRAM(S)/KG/HR: at 07:28

## 2024-10-19 RX ADMIN — IPRATROPIUM BROMIDE AND ALBUTEROL SULFATE 3 MILLILITER(S): .5; 2.5 SOLUTION RESPIRATORY (INHALATION) at 05:44

## 2024-10-19 NOTE — PROGRESS NOTE ADULT - ASSESSMENT
72 yo m pmhx HTN, DM2, ESRD on HD (MWF, RUE AV Fistula), CVA with residual R sided weakness and dsyphagia s/p peg, s/p trach since decannulated, RLE DVT (completed course of Eliquis) admitted with RLL PNA with course c/b acute hypoxic respiratory failure ultimately requiring intubation, failed trial of extubation and was subsequently reintubated 10/14, distributive shock.       POCUS  with dense RLL consolidations B lines R side only, b/l lung sliding, no pleff, normal LV fxn, normal RV size/fxn and no pericardial effusion    NEURO: sedated on propofol, daily awakening/sedation vacation as tolerated  more alert today    CV: distributive shock requiring vasopressor therapy, actively titrating levophed for map >65, midodrine as adjunctive therapy  has worsening requirements during HD, overall better today    RESP: cont lung protective strategies, actively titrating fio2/peep for spo2 >92%, weaning settings as tolerated  cxr and POCUS  both show cont RLL consolidations  improved w/ Left side down   cont aggressive airway clearance-  inh bronchodilators/3% saline for secretions  already s/p bronch showing thick secretions     RENAL: f/u renal reccs, cont HD     GI: cont tf as tolerated, bowel regimen, ppi  severe worsening of LFTs c/f shock liver given worsening hemodynamically as well, now improving, ctm  ruq US negative    ENDO: ISS for glycemic control   ID: Levaquin for pseudomonas and Stenotrophomonas in the sputum, given worsening condition will d/w ID re broadening abx for tx of steno and PsA- ID recc cont levaquin for now    HEME: heparin for vte ppx     72 yo m pmhx HTN, DM2, ESRD on HD (MWF, RUE AV Fistula), CVA with residual R sided weakness and dsyphagia s/p peg, s/p trach since decannulated, RLE DVT (completed course of Eliquis) admitted with RLL PNA with course c/b acute hypoxic respiratory failure ultimately requiring intubation, failed trial of extubation and was subsequently reintubated 10/14, distributive shock.       POCUS  with dense RLL consolidations B lines R side only, b/l lung sliding, no pleff, normal LV fxn, normal RV size/fxn and no pericardial effusion    NEURO: sedated on propofol, daily awakening/sedation vacation as tolerated  more alert today    CV: distributive shock requiring vasopressor therapy, actively titrating levophed for map >65, midodrine as adjunctive therapy  has worsening requirements during HD, overall better today    RESP: cont lung protective strategies, actively titrating fio2/peep for spo2 >92%, weaning settings as tolerated  fio2 requirements significantly improved today  cxr and POCUS  both show cont RLL consolidations  improved w/ Left side down   cont aggressive airway clearance-  inh bronchodilators/3% saline for secretions  already s/p bronch showing thick secretions     RENAL: f/u renal reccs, cont HD     GI: cont tf as tolerated, bowel regimen, ppi  severe worsening of LFTs c/f shock liver given worsening hemodynamically as well, now improving, ctm  ruq US negative    ENDO: ISS for glycemic control   ID: Levaquin for pseudomonas and Stenotrophomonas in the sputum, given worsening condition will d/w ID re broadening abx for tx of steno and PsA- ID recc cont levaquin for now    HEME: heparin for vte ppx

## 2024-10-19 NOTE — PROGRESS NOTE ADULT - SUBJECTIVE AND OBJECTIVE BOX
HPI:  Megha Art is a 73 year old male with PMHx of HTN, IDDM2, ESRD on HD (M/W/F, through RUE AV fistula), hx of CVA x 2 (with residual R. sided weakness and dysphagia s/p PEG tube, previously with tracheostomy and now removed), and hx of RLE DVT (completed apixaban course) who presented to the ED on 10/4/24 for complaints of cough and chills.    History obtained from wife at bedside. As per wife, patient typically coughs at baseline since he previously had a tracheostomy. However, for the past 2-3 days, he has been coughing more frequently and it has been productive with yellow phlegm. Wife was assisting him with getting dressed this afternoon around 1 PM when he felt nauseous and started complaining of abdominal pain. Then had a bowel movement. When wife was cleaning him up, patient was shaking "like he had chills" and she thinks his eyes rolled back for a few seconds. No loss of consciousness. EMS was called and another episode of shaking upon EMS arrival. Of note, patient is due for dialysis today but did not receive it since he came to the ER today. Baseline functional status is wheelchair bound and dependent with all ADLs. NPO with Nepro q4. Lives at home with wife.     In the ED, Tmax 101.3, HR as elevated as 118, BP as elevated as 201/68, and SpO2 as low as 91% on room air. Initially placed on 15L NRB with improvement of SpO2 to 95%. Now on room air. WBC 17.38K, hgb 9.9, sodium 129, BUN 61, Cr 4.05, alkphos 169. Lactic acid 3.6. CT head without evidence of acute hemorrhage mass or mass effect but with encephalomalacia and gliosis again seen involving the bifrontal region. CT CAP with pneumonia and rectum distended with stool. Received  cc bolus, vancomycin 1 g IV, zosyn 3.375 g IV, acetaminophen 1 g IV, hydralazine 10 mg IV, pantoprazole 40 mg IV, and ondansetron 4 mg IV. Evaluated by nephrology who is planning for HD tomorrow. (04 Oct 2024 22:03)      24 hr events:  improved pressor requirements  s/p HD yesterday and plan for HD today    ## Labs:  CBC:                        7.8    22.50 )-----------( 246      ( 19 Oct 2024 03:47 )             22.7     Chem:  10-19    136  |  100  |  65[H]  ----------------------------<  158[H]  3.9   |  21[L]  |  4.69[H]    Ca    7.3[L]      19 Oct 2024 03:47  Phos  5.7     10-19  Mg     2.3     10-19    TPro  6.4  /  Alb  1.5[L]  /  TBili  0.4  /  DBili  x   /  AST  2878[H]  /  ALT  1681[H]  /  AlkPhos  159[H]  10-19         ## Medications:  levoFLOXacin IVPB 500 milliGRAM(s) IV Intermittent every 48 hours    midodrine 10 milliGRAM(s) Oral every 8 hours  phenylephrine    Infusion 1 MICROgram(s)/kG/Min IV Continuous <Continuous>    albuterol/ipratropium for Nebulization 3 milliLiter(s) Nebulizer every 6 hours  sodium chloride 3%  Inhalation 4 milliLiter(s) Inhalation every 6 hours    insulin lispro (ADMELOG) corrective regimen sliding scale   SubCutaneous every 6 hours    heparin   Injectable 5000 Unit(s) SubCutaneous every 12 hours    pantoprazole   Suspension 40 milliGRAM(s) Enteral Tube daily  polyethylene glycol 3350 17 Gram(s) Oral daily  senna 2 Tablet(s) Oral at bedtime    fentaNYL   Infusion. 0.5 MICROgram(s)/kG/Hr IV Continuous <Continuous>      ## Vitals:  T(C): 36.8 (10-19-24 @ 11:35), Max: 37.3 (10-19-24 @ 06:30)  HR: 82 (10-19-24 @ 13:00) (65 - 139)  BP: --  BP(mean): --  RR: 18 (10-19-24 @ 13:00) (11 - 23)  SpO2: 100% (10-19-24 @ 13:00) (96% - 100%)  Wt(kg): --  Vent: Mode: AC/ CMV (Assist Control/ Continuous Mandatory Ventilation), RR (machine): 16, RR (patient): 21, TV (machine): 400, FiO2: 70, PEEP: 8, PIP: 24  ABG: ABG - ( 19 Oct 2024 06:02 )  pH, Arterial: 7.39  pH, Blood: x     /  pCO2: 40    /  pO2: 113   / HCO3: 24    / Base Excess: -0.7  /  SaO2: 98.6                  10-18 @ 07:01  -  10-19 @ 07:00  --------------------------------------------------------  IN: 1236.5 mL / OUT: 0 mL / NET: 1236.5 mL    10-19 @ 07:01  -  10-19 @ 14:28  --------------------------------------------------------  IN: 97.4 mL / OUT: 0 mL / NET: 97.4 mL    PHYSICAL EXAM:  GENERAL: NAD   ENT: ET Tube in place   CHEST/LUNG: Clear to auscultation bilaterally; No rales, rhonchi, wheezing, or rubs  HEART: Regular rate and rhythm; No murmurs, rubs, or gallops  ABDOMEN: Soft, Nontender, Nondistended; Bowel sounds present  VASCULAR:  2+ Peripheral Pulses, No clubbing, cyanosis, or edema  LYMPH: No lymphadenopathy noted  SKIN: No rashes or lesions  NERVOUS SYSTEM:  Sedated but awake and alert today, intermittently appears to be trying to follow

## 2024-10-19 NOTE — PROGRESS NOTE ADULT - SUBJECTIVE AND OBJECTIVE BOX
S/p stable HD today, intubated in ICU    Vital Signs Last 24 Hrs  T(C): 37.6 (10-19-24 @ 23:17), Max: 37.6 (10-19-24 @ 23:17)  T(F): 99.6 (10-19-24 @ 23:17), Max: 99.6 (10-19-24 @ 23:17)  HR: 71 (10-19-24 @ 21:10) (65 - 95)  RR: 19 (10-19-24 @ 21:00) (16 - 21)  SpO2: 100% (10-19-24 @ 21:10) (93% - 100%)    I&O's Detail    18 Oct 2024 07:01  -  19 Oct 2024 07:00  --------------------------------------------------------  IN:    FentaNYL: 126.5 mL    IV PiggyBack: 100 mL    Nepro with Carb Steady: 450 mL    Norepinephrine: 50 mL    Phenylephrine: 205.2 mL    PRBCs (Packed Red Blood Cells): 300 mL    Propofol: 4.8 mL  Total IN: 1236.5 mL    OUT:  Total OUT: 0 mL    19 Oct 2024 07:01  -  19 Oct 2024 23:52  --------------------------------------------------------  IN:    FentaNYL: 46.7 mL    Nepro with Carb Steady: 70 mL    Phenylephrine: 40.4 mL  Total IN: 157.1 mL    OUT:    Other (mL): 1000 mL  Total OUT: 1000 mL    Mode: AC/ CMV (Assist Control/ Continuous Mandatory Ventilation)  RR (machine): 16  TV (machine): 400  FiO2: 60  PEEP: 8  ITime: 1  MAP: 11  PIP: 23    Lungs b/l air entry  Heart S1S2  Abd soft ND   + PEG   Extremities tr edema                                7.8    22.50 )-----------( 246      ( 19 Oct 2024 03:47 )             22.7     19 Oct 2024 03:47    136    |  100    |  65     ----------------------------<  158    3.9     |  21     |  4.69     Ca    7.3        19 Oct 2024 03:47  Phos  5.7       19 Oct 2024 03:47  Mg     2.3       19 Oct 2024 03:47    TPro  6.4    /  Alb  1.5    /  TBili  0.4    /  DBili  x      /  AST  2878   /  ALT  1681   /  AlkPhos  159    19 Oct 2024 03:47    LIVER FUNCTIONS - ( 19 Oct 2024 03:47 )  Alb: 1.5 g/dL / Pro: 6.4 gm/dL / ALK PHOS: 159 U/L / ALT: 1681 U/L / AST: 2878 U/L / GGT: x           Culture - Blood (collected 18 Oct 2024 11:10)  Source: .Blood BLOOD  Preliminary Report (19 Oct 2024 15:01):    No growth at 24 hours    albuterol/ipratropium for Nebulization 3 milliLiter(s) Nebulizer every 6 hours  chlorhexidine 0.12% Liquid 15 milliLiter(s) Oral Mucosa every 12 hours  chlorhexidine 2% Cloths 1 Application(s) Topical <User Schedule>  fentaNYL   Infusion. 0.5 MICROgram(s)/kG/Hr IV Continuous <Continuous>  ferrous    sulfate Liquid 300 milliGRAM(s) Enteral Tube daily  heparin   Injectable 5000 Unit(s) SubCutaneous every 12 hours  insulin lispro (ADMELOG) corrective regimen sliding scale   SubCutaneous every 6 hours  levoFLOXacin IVPB 500 milliGRAM(s) IV Intermittent every 48 hours  midodrine 10 milliGRAM(s) Oral every 8 hours  Nephro-noam 1 Tablet(s) Oral daily  pantoprazole   Suspension 40 milliGRAM(s) Enteral Tube daily  phenylephrine    Infusion 1 MICROgram(s)/kG/Min IV Continuous <Continuous>  polyethylene glycol 3350 17 Gram(s) Oral daily  senna 2 Tablet(s) Oral at bedtime  sodium chloride 3%  Inhalation 4 milliLiter(s) Inhalation every 6 hours    Assessment/Plan:    ESRD on HD  Hx CVA, resp failure, s/p PEG   Recurrent asp PNA  Abx  HD today  Fluid removal 1kg  Epoetin w/HD  Next HD on Monday    [phone]

## 2024-10-20 LAB
ALBUMIN SERPL ELPH-MCNC: 1.6 G/DL — LOW (ref 3.3–5)
ALP SERPL-CCNC: 151 U/L — HIGH (ref 40–120)
ALT FLD-CCNC: 1147 U/L — HIGH (ref 12–78)
ANION GAP SERPL CALC-SCNC: 14 MMOL/L — SIGNIFICANT CHANGE UP (ref 5–17)
AST SERPL-CCNC: 1137 U/L — HIGH (ref 15–37)
BASOPHILS # BLD AUTO: 0.02 K/UL — SIGNIFICANT CHANGE UP (ref 0–0.2)
BASOPHILS NFR BLD AUTO: 0.1 % — SIGNIFICANT CHANGE UP (ref 0–2)
BILIRUB SERPL-MCNC: 0.5 MG/DL — SIGNIFICANT CHANGE UP (ref 0.2–1.2)
BUN SERPL-MCNC: 42 MG/DL — HIGH (ref 7–23)
CALCIUM SERPL-MCNC: 8.5 MG/DL — SIGNIFICANT CHANGE UP (ref 8.5–10.1)
CHLORIDE SERPL-SCNC: 102 MMOL/L — SIGNIFICANT CHANGE UP (ref 96–108)
CO2 SERPL-SCNC: 21 MMOL/L — LOW (ref 22–31)
CREAT SERPL-MCNC: 3.55 MG/DL — HIGH (ref 0.5–1.3)
EGFR: 18 ML/MIN/1.73M2 — LOW
EOSINOPHIL # BLD AUTO: 0.09 K/UL — SIGNIFICANT CHANGE UP (ref 0–0.5)
EOSINOPHIL NFR BLD AUTO: 0.4 % — SIGNIFICANT CHANGE UP (ref 0–6)
GLUCOSE BLDC GLUCOMTR-MCNC: 173 MG/DL — HIGH (ref 70–99)
GLUCOSE BLDC GLUCOMTR-MCNC: 184 MG/DL — HIGH (ref 70–99)
GLUCOSE BLDC GLUCOMTR-MCNC: 202 MG/DL — HIGH (ref 70–99)
GLUCOSE BLDC GLUCOMTR-MCNC: 207 MG/DL — HIGH (ref 70–99)
GLUCOSE BLDC GLUCOMTR-MCNC: 211 MG/DL — HIGH (ref 70–99)
GLUCOSE SERPL-MCNC: 188 MG/DL — HIGH (ref 70–99)
HBV SURFACE AB SER-ACNC: SIGNIFICANT CHANGE UP
HBV SURFACE AG SER-ACNC: SIGNIFICANT CHANGE UP
HCT VFR BLD CALC: 23.2 % — LOW (ref 39–50)
HGB BLD-MCNC: 7.9 G/DL — LOW (ref 13–17)
IMM GRANULOCYTES NFR BLD AUTO: 1 % — HIGH (ref 0–0.9)
LYMPHOCYTES # BLD AUTO: 0.76 K/UL — LOW (ref 1–3.3)
LYMPHOCYTES # BLD AUTO: 3.7 % — LOW (ref 13–44)
MAGNESIUM SERPL-MCNC: 2.2 MG/DL — SIGNIFICANT CHANGE UP (ref 1.6–2.6)
MCHC RBC-ENTMCNC: 23.9 PG — LOW (ref 27–34)
MCHC RBC-ENTMCNC: 34.1 G/DL — SIGNIFICANT CHANGE UP (ref 32–36)
MCV RBC AUTO: 70.1 FL — LOW (ref 80–100)
MONOCYTES # BLD AUTO: 0.59 K/UL — SIGNIFICANT CHANGE UP (ref 0–0.9)
MONOCYTES NFR BLD AUTO: 2.9 % — SIGNIFICANT CHANGE UP (ref 2–14)
NEUTROPHILS # BLD AUTO: 18.93 K/UL — HIGH (ref 1.8–7.4)
NEUTROPHILS NFR BLD AUTO: 91.9 % — HIGH (ref 43–77)
NRBC # BLD: 0 /100 WBCS — SIGNIFICANT CHANGE UP (ref 0–0)
PHOSPHATE SERPL-MCNC: 4.1 MG/DL — SIGNIFICANT CHANGE UP (ref 2.5–4.5)
PLATELET # BLD AUTO: 264 K/UL — SIGNIFICANT CHANGE UP (ref 150–400)
POTASSIUM SERPL-MCNC: 3.8 MMOL/L — SIGNIFICANT CHANGE UP (ref 3.5–5.3)
POTASSIUM SERPL-SCNC: 3.8 MMOL/L — SIGNIFICANT CHANGE UP (ref 3.5–5.3)
PROT SERPL-MCNC: 6.9 GM/DL — SIGNIFICANT CHANGE UP (ref 6–8.3)
RBC # BLD: 3.31 M/UL — LOW (ref 4.2–5.8)
RBC # FLD: 23.9 % — HIGH (ref 10.3–14.5)
SODIUM SERPL-SCNC: 137 MMOL/L — SIGNIFICANT CHANGE UP (ref 135–145)
WBC # BLD: 20.59 K/UL — HIGH (ref 3.8–10.5)
WBC # FLD AUTO: 20.59 K/UL — HIGH (ref 3.8–10.5)

## 2024-10-20 PROCEDURE — 99231 SBSQ HOSP IP/OBS SF/LOW 25: CPT

## 2024-10-20 PROCEDURE — 74018 RADEX ABDOMEN 1 VIEW: CPT | Mod: 26

## 2024-10-20 PROCEDURE — 99291 CRITICAL CARE FIRST HOUR: CPT

## 2024-10-20 PROCEDURE — 99497 ADVNCD CARE PLAN 30 MIN: CPT

## 2024-10-20 RX ORDER — ACETAMINOPHEN 500 MG
650 TABLET ORAL ONCE
Refills: 0 | Status: COMPLETED | OUTPATIENT
Start: 2024-10-20 | End: 2024-10-20

## 2024-10-20 RX ORDER — HYDROMORPHONE HCL/0.9% NACL/PF 6 MG/30 ML
0.5 PATIENT CONTROLLED ANALGESIA SYRINGE INTRAVENOUS EVERY 4 HOURS
Refills: 0 | Status: DISCONTINUED | OUTPATIENT
Start: 2024-10-20 | End: 2024-10-23

## 2024-10-20 RX ADMIN — Medication 650 MILLIGRAM(S): at 13:00

## 2024-10-20 RX ADMIN — Medication 650 MILLIGRAM(S): at 11:13

## 2024-10-20 RX ADMIN — Medication 2: at 23:54

## 2024-10-20 RX ADMIN — Medication 2: at 00:19

## 2024-10-20 RX ADMIN — Medication 4: at 17:35

## 2024-10-20 RX ADMIN — IPRATROPIUM BROMIDE AND ALBUTEROL SULFATE 3 MILLILITER(S): .5; 2.5 SOLUTION RESPIRATORY (INHALATION) at 05:20

## 2024-10-20 RX ADMIN — MIDODRINE HYDROCHLORIDE 10 MILLIGRAM(S): 2.5 TABLET ORAL at 05:22

## 2024-10-20 RX ADMIN — IPRATROPIUM BROMIDE AND ALBUTEROL SULFATE 3 MILLILITER(S): .5; 2.5 SOLUTION RESPIRATORY (INHALATION) at 00:51

## 2024-10-20 RX ADMIN — CHLORHEXIDINE GLUCONATE 15 MILLILITER(S): 40 SOLUTION TOPICAL at 17:35

## 2024-10-20 RX ADMIN — IPRATROPIUM BROMIDE AND ALBUTEROL SULFATE 3 MILLILITER(S): .5; 2.5 SOLUTION RESPIRATORY (INHALATION) at 11:05

## 2024-10-20 RX ADMIN — Medication 4: at 05:27

## 2024-10-20 RX ADMIN — Medication 4: at 11:12

## 2024-10-20 RX ADMIN — SODIUM CHLORIDE 4 MILLILITER(S): 9 INJECTION, SOLUTION INTRAMUSCULAR; INTRAVENOUS; SUBCUTANEOUS at 11:06

## 2024-10-20 RX ADMIN — Medication 2 TABLET(S): at 22:51

## 2024-10-20 RX ADMIN — SODIUM CHLORIDE 4 MILLILITER(S): 9 INJECTION, SOLUTION INTRAMUSCULAR; INTRAVENOUS; SUBCUTANEOUS at 00:51

## 2024-10-20 RX ADMIN — CHLORHEXIDINE GLUCONATE 15 MILLILITER(S): 40 SOLUTION TOPICAL at 05:22

## 2024-10-20 RX ADMIN — HEPARIN SODIUM 5000 UNIT(S): 10000 INJECTION INTRAVENOUS; SUBCUTANEOUS at 17:35

## 2024-10-20 RX ADMIN — IPRATROPIUM BROMIDE AND ALBUTEROL SULFATE 3 MILLILITER(S): .5; 2.5 SOLUTION RESPIRATORY (INHALATION) at 17:03

## 2024-10-20 RX ADMIN — HEPARIN SODIUM 5000 UNIT(S): 10000 INJECTION INTRAVENOUS; SUBCUTANEOUS at 05:22

## 2024-10-20 RX ADMIN — Medication 300 MILLIGRAM(S): at 11:16

## 2024-10-20 RX ADMIN — CHLORHEXIDINE GLUCONATE 1 APPLICATION(S): 40 SOLUTION TOPICAL at 05:23

## 2024-10-20 RX ADMIN — SODIUM CHLORIDE 4 MILLILITER(S): 9 INJECTION, SOLUTION INTRAMUSCULAR; INTRAVENOUS; SUBCUTANEOUS at 05:21

## 2024-10-20 RX ADMIN — SODIUM CHLORIDE 4 MILLILITER(S): 9 INJECTION, SOLUTION INTRAMUSCULAR; INTRAVENOUS; SUBCUTANEOUS at 17:03

## 2024-10-20 RX ADMIN — POLYETHYLENE GLYCOL 3350 17 GRAM(S): 17 POWDER, FOR SOLUTION ORAL at 11:13

## 2024-10-20 RX ADMIN — MIDODRINE HYDROCHLORIDE 10 MILLIGRAM(S): 2.5 TABLET ORAL at 13:38

## 2024-10-20 RX ADMIN — PANTOPRAZOLE SODIUM 40 MILLIGRAM(S): 40 TABLET, DELAYED RELEASE ORAL at 11:13

## 2024-10-20 RX ADMIN — Medication 0.5 MILLIGRAM(S): at 14:28

## 2024-10-20 RX ADMIN — Medication 1 TABLET(S): at 13:38

## 2024-10-20 RX ADMIN — Medication 0.5 MILLIGRAM(S): at 13:38

## 2024-10-20 NOTE — PROGRESS NOTE ADULT - SUBJECTIVE AND OBJECTIVE BOX
Mr. Art remains intubated, on phenylephrine    ICU Vital Signs Last 24 Hrs  T(C): 38 (20 Oct 2024 10:00), Max: 38 (20 Oct 2024 10:00)  T(F): 100.4 (20 Oct 2024 10:00), Max: 100.4 (20 Oct 2024 10:00)  HR: 93 (20 Oct 2024 12:25) (7 - 101)  BP: 124/55 (20 Oct 2024 12:25) (124/55 - 124/55)  BP(mean): 74 (20 Oct 2024 12:25) (74 - 74)  ABP: 116/38 (20 Oct 2024 11:30) (99/39 - 181/69)  ABP(mean): 63 (20 Oct 2024 11:30) (56 - 106)  RR: 20 (20 Oct 2024 12:25) (16 - 22)  SpO2: 96% (20 Oct 2024 12:25) (93% - 100%)    O2 Parameters below as of 20 Oct 2024 12:25  Patient On (Oxygen Delivery Method): ventilator    O2 Concentration (%): 35      I&O's Detail    19 Oct 2024 07:01  -  20 Oct 2024 07:00  --------------------------------------------------------  IN:    FentaNYL: 135.8 mL    Nepro with Carb Steady: 70 mL    Phenylephrine: 124.2 mL  Total IN: 330 mL    OUT:    Other (mL): 1000 mL    PEG (Percutaneous Endoscopic Gastrostomy) Tube (mL): 100 mL  Total OUT: 1100 mL    Total NET: -770 mL      20 Oct 2024 07:01  -  20 Oct 2024 12:36  --------------------------------------------------------  IN:    FentaNYL: 24.3 mL    IV PiggyBack: 100 mL    Phenylephrine: 21.6 mL  Total IN: 145.9 mL    OUT:  Total OUT: 0 mL    Total NET: 145.9 mL      Intubated on pressors  Trachea midline; previous trach scar healed  Abd is soft, not tender and not distended                          7.9    20.59 )-----------( 264      ( 20 Oct 2024 03:54 )             23.2   10-20    137  |  102  |  42[H]  ----------------------------<  188[H]  3.8   |  21[L]  |  3.55[H]    Ca    8.5      20 Oct 2024 03:54  Phos  4.1     10-20  Mg     2.2     10-20    TPro  6.9  /  Alb  1.6[L]  /  TBili  0.5  /  DBili  x   /  AST  1137[H]  /  ALT  1147[H]  /  AlkPhos  151[H]  10-20

## 2024-10-20 NOTE — PROGRESS NOTE ADULT - ASSESSMENT
Pt is a 74 yo M with h/o HTN, DM2, ESRD on HD (MWF, RUE AV Fistula), CVA with residual R sided weakness and dysphagia s/p peg, s/p trach since decannulated, RLE DVT (completed course of Eliquis) admitted with 1) RLL PNA with course complicated by acute hypoxic respiratory failure ultimately requiring intubation, failed trial of extubation and was subsequently reintubated 10/14 2) Septic shock 2 to PNA    Resp/GI/Social: Pt is full code/ Trach this week  ID: Abx as per ID  CVS: Phenylephrine to maintain MAP >65   HEME: DVT prophylaxis with sq Heparin  FEN: Severe protein-calorie malnutrition and Underweight (BMI < 19); cont enteral feeds  Endo: Follow FS and cover with Lispro according to set parameters  Renal: HD as per Renal  Neuro: Light sedation/analgesia prn

## 2024-10-20 NOTE — GOALS OF CARE CONVERSATION - ADVANCED CARE PLANNING - LOCATION OF DISCUSSION
----- Message from Laverne Cortes sent at 8/7/2017 10:21 AM CDT -----  Contact: pt  Pt needs a refill on hydrocortisone (CORTEF) 10 MG Tab  called into pharmacy at [id number] - [place], [place] - [address]        Pt can be reached at [phone]   Telephone

## 2024-10-20 NOTE — PROGRESS NOTE ADULT - SUBJECTIVE AND OBJECTIVE BOX
HPI:  Pt is a 72 yo M with h/o HTN, DM2, ESRD on HD (MWF, RUE AV Fistula), CVA with residual R sided weakness and dysphagia s/p peg, s/p trach since decannulated, RLE DVT (completed course of Eliquis) admitted with 1) RLL PNA with course complicated by acute hypoxic respiratory failure ultimately requiring intubation, failed trial of extubation and was subsequently reintubated 10/14 2) Septic shock      ## Labs:  CBC:                        7.9    20.59 )-----------( 264      ( 20 Oct 2024 03:54 )             23.2     Chem:  10-20    137  |  102  |  42[H]  ----------------------------<  188[H]  3.8   |  21[L]  |  3.55[H]    Ca    8.5      20 Oct 2024 03:54  Phos  4.1     10-20  Mg     2.2     10-20    TPro  6.9  /  Alb  1.6[L]  /  TBili  0.5  /  DBili  x   /  AST  1137[H]  /  ALT  1147[H]  /  AlkPhos  151[H]  10-20    Coags:          ## Imaging:    ## Medications:  levoFLOXacin IVPB 500 milliGRAM(s) IV Intermittent every 48 hours    midodrine 10 milliGRAM(s) Oral every 8 hours  phenylephrine    Infusion 1 MICROgram(s)/kG/Min IV Continuous <Continuous>    albuterol/ipratropium for Nebulization 3 milliLiter(s) Nebulizer every 6 hours  sodium chloride 3%  Inhalation 4 milliLiter(s) Inhalation every 6 hours    insulin lispro (ADMELOG) corrective regimen sliding scale   SubCutaneous every 6 hours    heparin   Injectable 5000 Unit(s) SubCutaneous every 12 hours    pantoprazole   Suspension 40 milliGRAM(s) Enteral Tube daily  polyethylene glycol 3350 17 Gram(s) Oral daily  senna 2 Tablet(s) Oral at bedtime    HYDROmorphone  Injectable 0.5 milliGRAM(s) IV Push every 4 hours PRN      ## Vitals:  T(C): 37.9 (10-20-24 @ 13:00), Max: 38 (10-20-24 @ 10:00)  HR: 74 (10-20-24 @ 15:00) (7 - 101)  BP: 135/48 (10-20-24 @ 15:00) (114/56 - 161/60)  BP(mean): 72 (10-20-24 @ 15:00) (68 - 94)  RR: 18 (10-20-24 @ 15:00) (16 - 22)  SpO2: 97% (10-20-24 @ 15:00) (92% - 100%)  Wt(kg): --  Vent: Mode: AC/ CMV (Assist Control/ Continuous Mandatory Ventilation), RR (machine): 16, RR (patient): 19, TV (machine): 400, FiO2: 35, PEEP: 8, PIP: 22  ABG: ABG - ( 19 Oct 2024 06:02 )  pH, Arterial: 7.39  pH, Blood: x     /  pCO2: 40    /  pO2: 113   / HCO3: 24    / Base Excess: -0.7  /  SaO2: 98.6                  10-19 @ 07:01  -  10-20 @ 07:00  --------------------------------------------------------  IN: 330 mL / OUT: 1100 mL / NET: -770 mL    10-20 @ 07:01  -  10-20 @ 15:49  --------------------------------------------------------  IN: 145.9 mL / OUT: 0 mL / NET: 145.9 mL          ## P/E:  Gen: lying comfortably in bed in no apparent distress  Mouth: (+) ETT  Lungs: B/l rhonchi  Heart: RRR  Abd: Soft/+BS/ Non-tender/ Pulsatile ? aorta  Ext: Atrophy LE  Neuro: Awake    CENTRAL LINE: [ ] YES [ ] NO  LOCATION:   DATE INSERTED:  REMOVE: [ ] YES [ ] NO      MCGILL: [ ] YES [ ] NO    DATE INSERTED:  REMOVE:  [ ] YES [ ] NO      A-LINE:  [ ] YES [ ] NO  LOCATION:   DATE INSERTED:  REMOVE:  [ ] YES [ ] NO  EXPLAIN:    CODE STATUS: [x ] full code  [ ] DNR  [ ] DNI  [ ] MOLST  Goals of care discussion: [x ] yes

## 2024-10-20 NOTE — GOALS OF CARE CONVERSATION - ADVANCED CARE PLANNING - CONVERSATION DETAILS
I discussed pt's care with his wife Pauline over the phone. I updated her on the patients status and endorsed that although his FiO2 requirements have improved he still has high secretion burden which he was unable to tolerate and led to him being intubated again.   I explained the options again of trach vs trial of extubation with maintaining comfort if fails.  She endorsed that she wants "All measures to help him." She endorsed that she doesn't want anything held back to treat him. She understands what is entailed with a trach/PEG as he already had one previously and wishes for a trach again. I explained that this time is different bc it is the second time he is being trached and is very weak. She understands and wants him to be trached. She said she was offered a family meeting this week but her wishes are clear.    All questions answered.

## 2024-10-20 NOTE — PROGRESS NOTE ADULT - SUBJECTIVE AND OBJECTIVE BOX
Intubated in ICU    Vital Signs Last 24 Hrs  T(C): 37 (10-20-24 @ 18:08), Max: 38 (10-20-24 @ 10:00)  T(F): 98.6 (10-20-24 @ 18:08), Max: 100.4 (10-20-24 @ 10:00)  HR: 78 (10-20-24 @ 18:08) (7 - 101)  BP: 123/55 (10-20-24 @ 18:08) (114/56 - 161/60)  BP(mean): 73 (10-20-24 @ 18:08) (68 - 94)  RR: 17 (10-20-24 @ 18:08) (16 - 22)  SpO2: 96% (10-20-24 @ 18:08) (92% - 100%)    Mode: AC/ CMV (Assist Control/ Continuous Mandatory Ventilation)  RR (machine): 16  TV (machine): 400  FiO2: 35  PEEP: 8  ITime: 1  MAP: 11  PIP: 22    Lungs b/l air entry  Heart S1S2  Abd soft ND   + PEG   Extremities tr edema                                         7.9    20.59 )-----------( 264      ( 20 Oct 2024 03:54 )             23.2     20 Oct 2024 03:54    137    |  102    |  42     ----------------------------<  188    3.8     |  21     |  3.55     Ca    8.5        20 Oct 2024 03:54  Phos  4.1       20 Oct 2024 03:54  Mg     2.2       20 Oct 2024 03:54    TPro  6.9    /  Alb  1.6    /  TBili  0.5    /  DBili  x      /  AST  1137   /  ALT  1147   /  AlkPhos  151    20 Oct 2024 03:54    LIVER FUNCTIONS - ( 20 Oct 2024 03:54 )  Alb: 1.6 g/dL / Pro: 6.9 gm/dL / ALK PHOS: 151 U/L / ALT: 1147 U/L / AST: 1137 U/L / GGT: x           Culture - Blood (collected 18 Oct 2024 11:10)  Source: .Blood BLOOD  Preliminary Report (20 Oct 2024 15:00):    No growth at 48 Hours    albuterol/ipratropium for Nebulization 3 milliLiter(s) Nebulizer every 6 hours  chlorhexidine 0.12% Liquid 15 milliLiter(s) Oral Mucosa every 12 hours  chlorhexidine 2% Cloths 1 Application(s) Topical <User Schedule>  ferrous    sulfate Liquid 300 milliGRAM(s) Enteral Tube daily  heparin   Injectable 5000 Unit(s) SubCutaneous every 12 hours  HYDROmorphone  Injectable 0.5 milliGRAM(s) IV Push every 4 hours PRN  insulin lispro (ADMELOG) corrective regimen sliding scale   SubCutaneous every 6 hours  levoFLOXacin IVPB 500 milliGRAM(s) IV Intermittent every 48 hours  midodrine 10 milliGRAM(s) Oral every 8 hours  Nephro-noma 1 Tablet(s) Oral daily  pantoprazole   Suspension 40 milliGRAM(s) Enteral Tube daily  phenylephrine    Infusion 1 MICROgram(s)/kG/Min IV Continuous <Continuous>  polyethylene glycol 3350 17 Gram(s) Oral daily  senna 2 Tablet(s) Oral at bedtime  sodium chloride 3%  Inhalation 4 milliLiter(s) Inhalation every 6 hours    Assessment/Plan:    ESRD on HD  Hx CVA, resp failure, s/p PEG   Recurrent asp PNA  Abx  S/p HD yesterday  Fluid removal 1kg  Epoetin w/HD  Next HD on Monday    [phone]

## 2024-10-21 LAB
ALBUMIN SERPL ELPH-MCNC: 1.8 G/DL — LOW (ref 3.3–5)
ALP SERPL-CCNC: 161 U/L — HIGH (ref 40–120)
ALT FLD-CCNC: 935 U/L — HIGH (ref 12–78)
ANION GAP SERPL CALC-SCNC: 14 MMOL/L — SIGNIFICANT CHANGE UP (ref 5–17)
AST SERPL-CCNC: 748 U/L — HIGH (ref 15–37)
BASOPHILS # BLD AUTO: 0.04 K/UL — SIGNIFICANT CHANGE UP (ref 0–0.2)
BASOPHILS NFR BLD AUTO: 0.2 % — SIGNIFICANT CHANGE UP (ref 0–2)
BILIRUB SERPL-MCNC: 0.5 MG/DL — SIGNIFICANT CHANGE UP (ref 0.2–1.2)
BUN SERPL-MCNC: 57 MG/DL — HIGH (ref 7–23)
CALCIUM SERPL-MCNC: 9.5 MG/DL — SIGNIFICANT CHANGE UP (ref 8.5–10.1)
CHLORIDE SERPL-SCNC: 101 MMOL/L — SIGNIFICANT CHANGE UP (ref 96–108)
CO2 SERPL-SCNC: 23 MMOL/L — SIGNIFICANT CHANGE UP (ref 22–31)
CREAT SERPL-MCNC: 4.8 MG/DL — HIGH (ref 0.5–1.3)
EGFR: 12 ML/MIN/1.73M2 — LOW
EOSINOPHIL # BLD AUTO: 0.1 K/UL — SIGNIFICANT CHANGE UP (ref 0–0.5)
EOSINOPHIL NFR BLD AUTO: 0.5 % — SIGNIFICANT CHANGE UP (ref 0–6)
GLUCOSE BLDC GLUCOMTR-MCNC: 185 MG/DL — HIGH (ref 70–99)
GLUCOSE BLDC GLUCOMTR-MCNC: 196 MG/DL — HIGH (ref 70–99)
GLUCOSE BLDC GLUCOMTR-MCNC: 199 MG/DL — HIGH (ref 70–99)
GLUCOSE BLDC GLUCOMTR-MCNC: 218 MG/DL — HIGH (ref 70–99)
GLUCOSE SERPL-MCNC: 166 MG/DL — HIGH (ref 70–99)
HCT VFR BLD CALC: 26.7 % — LOW (ref 39–50)
HGB BLD-MCNC: 8.9 G/DL — LOW (ref 13–17)
IMM GRANULOCYTES NFR BLD AUTO: 0.9 % — SIGNIFICANT CHANGE UP (ref 0–0.9)
LYMPHOCYTES # BLD AUTO: 0.72 K/UL — LOW (ref 1–3.3)
LYMPHOCYTES # BLD AUTO: 3.9 % — LOW (ref 13–44)
MAGNESIUM SERPL-MCNC: 2.4 MG/DL — SIGNIFICANT CHANGE UP (ref 1.6–2.6)
MCHC RBC-ENTMCNC: 23.5 PG — LOW (ref 27–34)
MCHC RBC-ENTMCNC: 33.3 G/DL — SIGNIFICANT CHANGE UP (ref 32–36)
MCV RBC AUTO: 70.6 FL — LOW (ref 80–100)
MONOCYTES # BLD AUTO: 0.58 K/UL — SIGNIFICANT CHANGE UP (ref 0–0.9)
MONOCYTES NFR BLD AUTO: 3.1 % — SIGNIFICANT CHANGE UP (ref 2–14)
NEUTROPHILS # BLD AUTO: 16.85 K/UL — HIGH (ref 1.8–7.4)
NEUTROPHILS NFR BLD AUTO: 91.4 % — HIGH (ref 43–77)
NRBC # BLD: 0 /100 WBCS — SIGNIFICANT CHANGE UP (ref 0–0)
PHOSPHATE SERPL-MCNC: 4.7 MG/DL — HIGH (ref 2.5–4.5)
PLATELET # BLD AUTO: 239 K/UL — SIGNIFICANT CHANGE UP (ref 150–400)
POTASSIUM SERPL-MCNC: 4 MMOL/L — SIGNIFICANT CHANGE UP (ref 3.5–5.3)
POTASSIUM SERPL-SCNC: 4 MMOL/L — SIGNIFICANT CHANGE UP (ref 3.5–5.3)
PROT SERPL-MCNC: 7.6 GM/DL — SIGNIFICANT CHANGE UP (ref 6–8.3)
RBC # BLD: 3.78 M/UL — LOW (ref 4.2–5.8)
RBC # FLD: 23.6 % — HIGH (ref 10.3–14.5)
SODIUM SERPL-SCNC: 138 MMOL/L — SIGNIFICANT CHANGE UP (ref 135–145)
WBC # BLD: 18.45 K/UL — HIGH (ref 3.8–10.5)
WBC # FLD AUTO: 18.45 K/UL — HIGH (ref 3.8–10.5)

## 2024-10-21 PROCEDURE — 99291 CRITICAL CARE FIRST HOUR: CPT

## 2024-10-21 PROCEDURE — 99232 SBSQ HOSP IP/OBS MODERATE 35: CPT

## 2024-10-21 PROCEDURE — 99231 SBSQ HOSP IP/OBS SF/LOW 25: CPT

## 2024-10-21 RX ADMIN — Medication 2: at 06:06

## 2024-10-21 RX ADMIN — IPRATROPIUM BROMIDE AND ALBUTEROL SULFATE 3 MILLILITER(S): .5; 2.5 SOLUTION RESPIRATORY (INHALATION) at 17:38

## 2024-10-21 RX ADMIN — CHLORHEXIDINE GLUCONATE 15 MILLILITER(S): 40 SOLUTION TOPICAL at 17:33

## 2024-10-21 RX ADMIN — SODIUM CHLORIDE 4 MILLILITER(S): 9 INJECTION, SOLUTION INTRAMUSCULAR; INTRAVENOUS; SUBCUTANEOUS at 05:26

## 2024-10-21 RX ADMIN — CHLORHEXIDINE GLUCONATE 15 MILLILITER(S): 40 SOLUTION TOPICAL at 06:05

## 2024-10-21 RX ADMIN — SODIUM CHLORIDE 4 MILLILITER(S): 9 INJECTION, SOLUTION INTRAMUSCULAR; INTRAVENOUS; SUBCUTANEOUS at 01:05

## 2024-10-21 RX ADMIN — MIDODRINE HYDROCHLORIDE 10 MILLIGRAM(S): 2.5 TABLET ORAL at 22:56

## 2024-10-21 RX ADMIN — IPRATROPIUM BROMIDE AND ALBUTEROL SULFATE 3 MILLILITER(S): .5; 2.5 SOLUTION RESPIRATORY (INHALATION) at 11:37

## 2024-10-21 RX ADMIN — SODIUM CHLORIDE 4 MILLILITER(S): 9 INJECTION, SOLUTION INTRAMUSCULAR; INTRAVENOUS; SUBCUTANEOUS at 11:37

## 2024-10-21 RX ADMIN — MIDODRINE HYDROCHLORIDE 10 MILLIGRAM(S): 2.5 TABLET ORAL at 14:01

## 2024-10-21 RX ADMIN — Medication 300 MILLIGRAM(S): at 11:10

## 2024-10-21 RX ADMIN — Medication 1 TABLET(S): at 11:10

## 2024-10-21 RX ADMIN — Medication 2: at 23:01

## 2024-10-21 RX ADMIN — CHLORHEXIDINE GLUCONATE 1 APPLICATION(S): 40 SOLUTION TOPICAL at 06:05

## 2024-10-21 RX ADMIN — PANTOPRAZOLE SODIUM 40 MILLIGRAM(S): 40 TABLET, DELAYED RELEASE ORAL at 11:10

## 2024-10-21 RX ADMIN — Medication 4: at 17:33

## 2024-10-21 RX ADMIN — HEPARIN SODIUM 5000 UNIT(S): 10000 INJECTION INTRAVENOUS; SUBCUTANEOUS at 17:33

## 2024-10-21 RX ADMIN — Medication 2: at 11:10

## 2024-10-21 RX ADMIN — IPRATROPIUM BROMIDE AND ALBUTEROL SULFATE 3 MILLILITER(S): .5; 2.5 SOLUTION RESPIRATORY (INHALATION) at 01:04

## 2024-10-21 RX ADMIN — HEPARIN SODIUM 5000 UNIT(S): 10000 INJECTION INTRAVENOUS; SUBCUTANEOUS at 06:05

## 2024-10-21 RX ADMIN — IPRATROPIUM BROMIDE AND ALBUTEROL SULFATE 3 MILLILITER(S): .5; 2.5 SOLUTION RESPIRATORY (INHALATION) at 05:26

## 2024-10-21 RX ADMIN — SODIUM CHLORIDE 4 MILLILITER(S): 9 INJECTION, SOLUTION INTRAMUSCULAR; INTRAVENOUS; SUBCUTANEOUS at 17:39

## 2024-10-21 NOTE — PROGRESS NOTE ADULT - SUBJECTIVE AND OBJECTIVE BOX
[facility]  INFECTIOUS DISEASES   [address] [place] [address]  Tel: [phone]     Fax: [phone]  ==============================================================================  DO Artie Aquino MD Alexandra Gutman, NP   ==============================================================================      JIMMY BLAND  MRN-[id number]  73y ([birth year])    Follow up: pneumonia    Interval History: pneumonia, ARF     Subjective: Patient seen and examined in ICU.  Remains intubated, on minimal sedation, and one vasopressor. No excessive oral secretions or diarrhea as per bedside RN    ROS  Unable to obtain due to sedation    Allergies  No Known Allergies      ANTIMICROBIALS:    MEDICATIONS  (STANDING):  levoFLOXacin IVPB 500 every 48 hours      MEDICATIONS  (STANDING):  albuterol/ipratropium for Nebulization 3 every 6 hours  heparin   Injectable 5000 every 12 hours  insulin lispro (ADMELOG) corrective regimen sliding scale  every 6 hours  midodrine 10 every 8 hours  pantoprazole   Suspension 40 daily  polyethylene glycol 3350 17 daily  senna 2 at bedtime  sodium chloride 3%  Inhalation 4 every 6 hours      PRN  HYDROmorphone  Injectable 0.5 milliGRAM(s) IV Push every 4 hours PRN      Physical Exam:  Vital Signs Last 24 Hrs  T(F): 99 (10-22-24 @ 00:00), Max: 99 (10-21-24 @ 22:00)  HR: 86 (10-22-24 @ 00:00)  BP: 141/62 (10-22-24 @ 00:00)  RR: 21 (10-22-24 @ 00:00)  SpO2: 96% (10-22-24 @ 00:00) (87% - 100%)  Wt(kg): --      General: Chronic ill appearing elderly man  HEENT: +ETT, IJ CVC has been removed   Cardio:  regular S1, S2,  no murmur  Respiratory:  Breath sounds coarse bilaterally, on vent  abd:  soft,   BS +,   no tenderness, + peg    Musculoskeletal:   no joint swelling,   no edema  Lines: +PIV, RUE AV fistula , rectal tube  Skin: warm dry skin  Neurologic: Sedated. Opens eyes, not tracking, following some simple commands like squeeze and and wiggle toes      CBC  WBC Count: 18.45 K/uL (10-21 @ 03:55)  WBC Count: 20.59 K/uL (10-20 @ 03:54)  WBC Count: 22.50 K/uL (10-19 @ 03:47)  WBC Count: 34.17 K/uL (10-18 @ 03:20)  WBC Count: 18.41 K/uL (10-17 @ 03:40)  WBC Count: 22.03 K/uL (10-15 @ 02:00)                            8.9    18.45 )-----------( 239      ( 21 Oct 2024 03:55 )             26.7       BMP/CMP  10-21    138  |  101  |  57[H]  ----------------------------<  166[H]  4.0   |  23  |  4.80[H]    Ca    9.5      21 Oct 2024 03:55  Phos  4.7     10-21  Mg     2.4     10-21    TPro  7.6  /  Alb  1.8[L]  /  TBili  0.5  /  DBili  x   /  AST  748[H]  /  ALT  935[H]  /  AlkPhos  161[H]  10-21      Creatinine Trend: 4.80<--, 3.55<--, 4.69<--, 7.91<--, 7.34<--, 6.52<--      MICROBIOLOGY:  Bronchial  10-14-24   Culture is being performed.  --  Pseudomonas aeruginosa  Culture - Bronchial (10.14.24 @ 11:20)   -  Meropenem: S <=1   -  Piperacillin/Tazobactam: R 64   -  Ceftazidime: R >16   -  Ciprofloxacin: S <=0.25   -  Imipenem: S 2   -  Levofloxacin: S <=0.5   -  Cefepime: I 16   -  Aztreonam: I 16        Bronchial  10-08-24   Few Pseudomonas aeruginosa  Moderate Stenotrophomonas maltophilia  --  Pseudomonas aeruginosa  Stenotrophomonas maltophilia          .Stool  10-08-24   No enteric pathogens isolated.  (Stool culture examined for Salmonella,  Shigella, Campylobacter, Aeromonas, Plesiomonas,  Vibrio, E.coli O157 and Yersinia)  --  --      .Blood BLOOD  10-07-24   No growth at 5 days  --  --      .Blood BLOOD  10-07-24   No growth at 5 days  --  --      .Blood BLOOD  10-04-24   No growth at 5 days  --  --      .Blood BLOOD  10-04-24   No growth at 5 days  --  --        Rapid RVP Result: NotDetec (10-08 @ 10:00)    Procalcitonin: 27.40 (10-07-24 @ 18:35)  Procalcitonin: 22.50 (10-05-24 @ 08:37)    Procalcitonin (10.18.24 @ 11:10)    Procalcitonin: 10.10: Note: Concentrations <0.5 ng/mL do not exclude an infection, on account  of localized infections (without systemic signs) which can be associated  with such low concentrations, or a systemic infection in its initial  stages (<6 hours). Furthermore, increased procalcitonin may occur without  infection. PCT concentrations between 0.5 and 2.0 ng/mL should be  interpreted taking into account the patient’s history. It is recommended  to retest PCT within 6-24 hours if any concentrations <2.0 mg/mL are  obtained. ng/mL      Rapid RVP Result: NotDetec (10-08-24 @ 10:00)  SARS-CoV-2: NotDetec (10-08-24 @ 10:00)    MRSA/MSSA PCR (10.09.24 @ 13:50)    MRSA PCR Result.: NotDetec: The results of this test should be interpreted with consideration of  clinical context.  Not Detected result indicates the absence of organisms or that the number  of organisms is below the assay limit of detection.  Detected result indicates the presence of organism nucleic acid.  Indeterminate result may indicate the presence of amplification  inhibitors in specimen; presence or absence of organisms cannot be  determined. Consider collecting new specimen if further testing is still  needed.  This qualitative PCR assay is FDA-approved, and its performance was  established by [facility] [employer], [place], [place].   Staph aureus PCR Result: Bethtec        RADIOLOGY:  < from: US Abdomen Upper Quadrant Right (10.18.24 @ 09:24) >    ACC: [id number] EXAM:  US ABDOMEN RT UPR QUADRANT   ORDERED BY: ESEQUIEL RIVERA     PROCEDURE DATE:  10/18/2024          INTERPRETATION:  CLINICAL INFORMATION: Elevated LFTs.    COMPARISON: None available.    TECHNIQUE: Sonography of the right upperquadrant.    FINDINGS:  Liver: Within normal limits.  Bile ducts: Normal caliber. Common bile duct measures 3 mm.  Gallbladder: Within normal limits.  Pancreas: Visualized portions are within normal limits.  Right kidney: 8.4 cm. No hydronephrosis. Mildly atrophic.  Ascites: None.  IVC: Visualized portions are within normal limits.  Other: Trace right pleural effusion.    IMPRESSION:  Etiology of elevated LFTs not elucidated.    Trace right pleural effusion.    MONALISA ESCOBEDO MD  This document has been electronically signed. Oct 18 2024  9:48AM      < from: Xray Chest 1 View- PORTABLE-Urgent (Xray Chest 1 View- PORTABLE-Urgent .) (10.14.24 @ 11:36) >    ACC: [id number] EXAM:  XR CHEST PORTABLE URGENT 1V   ORDERED BY: SEPIDEH REED     PROCEDURE DATE:  10/14/2024          INTERPRETATION:  AP chest on October 14, 2024 at 10:10 AM. Patient was   intubated.    Heart normal for projection.    Thereis a persistent retrocardiac infiltrate and right base infiltrate.   Right base infiltrate may be slightly better aerated than on October 13.    Endotracheal tube insertion.    NG tube is noted.    IMPRESSION: Persistent bibasilar infiltrates showingslightly better   aeration at the right base. Intubation.      ITALIA ALY MD  This document has been electronically signed. Oct 14 2024  2:40PM

## 2024-10-21 NOTE — PROGRESS NOTE ADULT - ASSESSMENT
74 yo M with h/o HTN, DM2, ESRD on HD (MWF, RUE AV Fistula), CVA with residual R sided weakness and dysphagia s/p peg, s/p trach since decannulated, RLE DVT (completed course of Eliquis) admitted with 1) RLL PNA with course complicated by acute hypoxic respiratory failure ultimately requiring intubation, failed trial of extubation and was subsequently reintubated 10/14 2) Septic shock 2 to PNA    Resp/GI/Social: Pt is full code/ Trach this week in OR.  ID: Abx as per ID  CVS: Phenylephrine to maintain MAP >65   HEME: DVT prophylaxis with sq Heparin  FEN: Severe protein-calorie malnutrition and Underweight (BMI < 19); cont enteral feeds  Endo: Follow FS and cover with Lispro according to set parameters  Renal: HD as per Renal  Neuro: Light sedation/analgesia for pain and vent synchrony.

## 2024-10-21 NOTE — PROGRESS NOTE ADULT - SUBJECTIVE AND OBJECTIVE BOX
Patient seen and examined at bedside with Dr. Wheatley. Intubated, off vasopressors.   TF running at bedside.         Vital Signs Last 24 Hrs  T(F): 98.1 (10-21-24 @ 09:30), Max: 100.3 (10-20-24 @ 13:00)  HR: 81 (10-21-24 @ 09:30)  BP: 137/59 (10-21-24 @ 09:00)  RR: 17 (10-21-24 @ 09:30)  SpO2: 100% (10-21-24 @ 09:30)  Wt(kg): --   CAPILLARY BLOOD GLUCOSE      POCT Blood Glucose.: 185 mg/dL (21 Oct 2024 06:00)      GENERAL: Intubated  CHEST/LUNG: Respirations non labored, breathing synchronously with ventilaror   HEART: S1S2  HEENT: NCAT, EOMI, conjunctiva clear   ABDOMEN: Nondistended, + bowel sounds, nontender  EXTREMITIES:  No calf tenderness, no edema    I&O's Detail    20 Oct 2024 07:01  -  21 Oct 2024 07:00  --------------------------------------------------------  IN:    FentaNYL: 24.3 mL    IV PiggyBack: 100 mL    Nepro with Carb Steady: 60 mL    Phenylephrine: 21.6 mL  Total IN: 205.9 mL    OUT:    Other (mL): 0 mL  Total OUT: 0 mL    Total NET: 205.9 mL      21 Oct 2024 07:01  -  21 Oct 2024 10:46  --------------------------------------------------------  IN:    Nepro with Carb Steady: 20 mL  Total IN: 20 mL    OUT:  Total OUT: 0 mL    Total NET: 20 mL          LABS:                        8.9    18.45 )-----------( 239      ( 21 Oct 2024 03:55 )             26.7     10-21    138  |  101  |  57[H]  ----------------------------<  166[H]  4.0   |  23  |  4.80[H]    Ca    9.5      21 Oct 2024 03:55  Phos  4.7     10-21  Mg     2.4     10-21    TPro  7.6  /  Alb  1.8[L]  /  TBili  0.5  /  DBili  x   /  AST  748[H]  /  ALT  935[H]  /  AlkPhos  161[H]  10-21        RADIOLOGY & ADDITIONAL STUDIES:         Patient seen and examined at bedside with Dr. Wheatley. Intubated, off vasopressors.   TF running at bedside.         Vital Signs Last 24 Hrs  T(F): 98.1 (10-21-24 @ 09:30), Max: 100.3 (10-20-24 @ 13:00)  HR: 81 (10-21-24 @ 09:30)  BP: 137/59 (10-21-24 @ 09:00)  RR: 17 (10-21-24 @ 09:30)  SpO2: 100% (10-21-24 @ 09:30)  Wt(kg): --   CAPILLARY BLOOD GLUCOSE      POCT Blood Glucose.: 185 mg/dL (21 Oct 2024 06:00)      GENERAL: Intubated  CHEST/LUNG: Respirations non labored, breathing synchronously with ventilaror   HEART: S1S2  HEENT: NCAT, EOMI, conjunctiva clear   ABDOMEN: Nondistended, + bowel sounds, nontender, PEG in place  EXTREMITIES:  No calf tenderness, no edema    I&O's Detail    20 Oct 2024 07:01  -  21 Oct 2024 07:00  --------------------------------------------------------  IN:    FentaNYL: 24.3 mL    IV PiggyBack: 100 mL    Nepro with Carb Steady: 60 mL    Phenylephrine: 21.6 mL  Total IN: 205.9 mL    OUT:    Other (mL): 0 mL  Total OUT: 0 mL    Total NET: 205.9 mL      21 Oct 2024 07:01  -  21 Oct 2024 10:46  --------------------------------------------------------  IN:    Nepro with Carb Steady: 20 mL  Total IN: 20 mL    OUT:  Total OUT: 0 mL    Total NET: 20 mL          LABS:                        8.9    18.45 )-----------( 239      ( 21 Oct 2024 03:55 )             26.7     10-21    138  |  101  |  57[H]  ----------------------------<  166[H]  4.0   |  23  |  4.80[H]    Ca    9.5      21 Oct 2024 03:55  Phos  4.7     10-21  Mg     2.4     10-21    TPro  7.6  /  Alb  1.8[L]  /  TBili  0.5  /  DBili  x   /  AST  748[H]  /  ALT  935[H]  /  AlkPhos  161[H]  10-21

## 2024-10-21 NOTE — CONSULT NOTE ADULT - ASSESSMENT
ESRD:  On HD TIW, last on Saturday.  - HD TIW.   - Will discuss prognosis with his wife.  - Limit UF due to Hypotension.  - Midodrine as needed.      Anemia:  - Transfuse for Hg < 7.  - Continue EPO.    Respiratory Failure:  Failed extubation.  - Care as per ICU team.    Otf Mena MD  Nephrology

## 2024-10-21 NOTE — CONSULT NOTE ADULT - SUBJECTIVE AND OBJECTIVE BOX
JIMMY BLAND  Patient is a 73y old  Male who presents with a chief complaint of Sepsis and acute hypoxic respiratory failure secondary to suspected aspiration PNA vs. HCAP (21 Oct 2024 13:22)    HPI:  Jimmy Bland is a 73 year old male with PMHx of HTN, IDDM2, ESRD on HD (M/W/F, through RUE AV fistula), hx of CVA x 2 (with residual R. sided weakness and dysphagia s/p PEG tube, previously with tracheostomy and now removed), and hx of RLE DVT (completed apixaban course) who presented to the ED on 10/4/24 for complaints of cough and chills.  He had fever, pneumonia and respiratory failure. Intubated and twice failed extubation. His family wants opinion on continuation of HD.  He remains intubated, drowsy but opens his eyes.    PAST MEDICAL & SURGICAL HISTORY:  ESRD on hemodialysis      HTN (hypertension)      Insulin dependent type 2 diabetes mellitus      History of cerebrovascular accident (CVA) with residual deficit      History of DVT of lower extremity      Arteriovenous fistula      S/P percutaneous endoscopic gastrostomy (PEG) tube placement      History of tracheostomy        MEDICATIONS  (STANDING):  albuterol/ipratropium for Nebulization 3 milliLiter(s) Nebulizer every 6 hours  chlorhexidine 0.12% Liquid 15 milliLiter(s) Oral Mucosa every 12 hours  chlorhexidine 2% Cloths 1 Application(s) Topical <User Schedule>  ferrous    sulfate Liquid 300 milliGRAM(s) Enteral Tube daily  heparin   Injectable 5000 Unit(s) SubCutaneous every 12 hours  insulin lispro (ADMELOG) corrective regimen sliding scale   SubCutaneous every 6 hours  levoFLOXacin IVPB 500 milliGRAM(s) IV Intermittent every 48 hours  midodrine 10 milliGRAM(s) Oral every 8 hours  Nephro-noam 1 Tablet(s) Oral daily  pantoprazole   Suspension 40 milliGRAM(s) Enteral Tube daily  polyethylene glycol 3350 17 Gram(s) Oral daily  senna 2 Tablet(s) Oral at bedtime  sodium chloride 3%  Inhalation 4 milliLiter(s) Inhalation every 6 hours    Allergies    No Known Allergies    Intolerances      FAMILY HISTORY:  FHx: diabetes mellitus (Father, Aunt)        REVIEW OF SYSTEMS    Not able to assess.      Vital Signs Last 24 Hrs  T(C): 37.1 (21 Oct 2024 21:00), Max: 37.1 (21 Oct 2024 09:00)  T(F): 98.8 (21 Oct 2024 21:00), Max: 98.8 (21 Oct 2024 09:00)  HR: 80 (21 Oct 2024 21:00) (71 - 93)  BP: 147/62 (21 Oct 2024 21:00) (105/54 - 187/94)  BP(mean): 85 (21 Oct 2024 21:00) (69 - 115)  RR: 24 (21 Oct 2024 21:00) (15 - 26)  SpO2: 92% (21 Oct 2024 21:00) (87% - 100%)    Parameters below as of 21 Oct 2024 21:00  Patient On (Oxygen Delivery Method): ventilator    O2 Concentration (%): 35    PHYSICAL EXAMINATION:  Constitutional:  He appears comfortable and not distressed. Not diaphoretic. Intubated.    Neck:  The thyroid is normal. Trachea is midline. Tracheostomy scar.    Respiratory: The lungs are clear to auscultation. No dullness and expansion is normal.    Cardiovascular: S1 and S2 are normal. No murmurs, rubs or gallops are present.    Gastrointestinal: The abdomen is soft. No tenderness is present. PEG in place.    Genitourinary: The bladder is not distended. No CVA tenderness is present.    Extremities: No edema is noted. No deformities are present.    Neurological: Lethargic.    Skin: No leaons are seen  or palpated.                          8.9    18.45 )-----------( 239      ( 21 Oct 2024 03:55 )             26.7     10-21    138  |  101  |  57[H]  ----------------------------<  166[H]  4.0   |  23  |  4.80[H]    Ca    9.5      21 Oct 2024 03:55  Phos  4.7     10-21  Mg     2.4     10-21    TPro  7.6  /  Alb  1.8[L]  /  TBili  0.5  /  DBili  x   /  AST  748[H]  /  ALT  935[H]  /  AlkPhos  161[H]  10-21    LIVER FUNCTIONS - ( 21 Oct 2024 03:55 )  Alb: 1.8 g/dL / Pro: 7.6 gm/dL / ALK PHOS: 161 U/L / ALT: 935 U/L / AST: 748 U/L / GGT: x           Urinalysis Basic - ( 21 Oct 2024 03:55 )    Color: x / Appearance: x / SG: x / pH: x  Gluc: 166 mg/dL / Ketone: x  / Bili: x / Urobili: x   Blood: x / Protein: x / Nitrite: x   Leuk Esterase: x / RBC: x / WBC x   Sq Epi: x / Non Sq Epi: x / Bacteria: x

## 2024-10-21 NOTE — PROGRESS NOTE ADULT - ASSESSMENT
72 Y/O male intubated in ICU with respiratory failure.   Tracheostomy planning in OR for Wednesday.   PEEP: 8; FiO2: 35%    PLAN:     - OR planning for tracheostomy creation   - Preoperative labs to be drawn on Tuesday   - Continue care per ICU   - Discussed with Dr. Wheatley

## 2024-10-21 NOTE — CONSULT NOTE ADULT - CONSULT REQUESTED DATE/TIME
08-Oct-2024 09:00
08-Oct-2024 16:44
08-Oct-2024 19:03
18-Oct-2024 12:08
21-Oct-2024 22:04
04-Oct-2024 16:22
17-Oct-2024 14:13
16-Oct-2024 16:31

## 2024-10-21 NOTE — PROGRESS NOTE ADULT - NS ATTEND AMEND GEN_ALL_CORE FT
Patient seen and examined with PAs  Discussed plan for tracheostomy with patient's daughter at bedside  OR planning for Wednesday for tracheostomy with Dr. Bowen Wheatley

## 2024-10-21 NOTE — PROGRESS NOTE ADULT - SUBJECTIVE AND OBJECTIVE BOX
[facility], [facility]  NEPHROLOGY AND HYPERTENSION  [address]  [address]  [place] [address]  [phone]    MD RONNA CHANG MD YELENA ROSENBERG, MD BINNY KOSHY, MD CHRISTOPHER CAPUTO, MD EDWARD BOVER, MD        Patient's wife has requested a second nephrology opinion with follow up.  Case discussed with Dr. Otf Mena. He agreed to see today and assume care.  Please call with any questions.         Deolnte Moya MD

## 2024-10-21 NOTE — PROGRESS NOTE ADULT - ASSESSMENT
Megha Art is a 73 year old male with PMHx of HTN, IDDM2, ESRD on HD (M/W/F, through RUE AV fistula), hx of CVA x 2 (with residual R. sided weakness and dysphagia s/p PEG tube, previously with tracheostomy and now removed), and hx of RLE DVT (completed apixaban course) who presented to the ED on 10/4/24 for complaints of cough and chills. In the ED, Tmax 101.3, HR as elevated as 118, BP as elevated as 201/68, and SpO2 as low as 91% on room air. Initially placed on 15L NRB with improvement of SpO2 to 95%. Now on room air. WBC 17.38K, hgb 9.9, sodium 129, BUN 61, Cr 4.05, alkphos 169. Lactic acid 3.6. CT head without evidence of acute hemorrhage mass or mass effect but with encephalomalacia and gliosis again seen involving the bifrontal region. CT CAP with pneumonia and rectum distended with stool. Received  cc bolus, vancomycin 1 g IV, zosyn 3.375 g IV, acetaminophen 1 g IV, hydralazine 10 mg IV, pantoprazole 40 mg IV, and ondansetron 4 mg IV. Evaluated by nephrology who is planning for HD tomorrow. (04 Oct 2024 22:03)    Hospital course has been complicated with long ICU stay, failed trial of extubation 10/11, reintubation on 10/14. He is s/p bronch x 2. BAL with PsAg and Stenotrophomonas. He was on Pip-deb from 10/4-->10/15. Switched to Levofloxacin IV on 10/16.  ID was re-called last week for antibiotics management.      10/9: intubated, sedated, getting hemodialysis again today, continue high dose zosyn while awaiting cultures, cultures negative thus far, s/p bronch and will follow those cultures and adjust antibiotics accordingly   10/10: Afebrile, Intubated, sedated. Noted with Hgb of 5.9 this am. Improved tracheal secretions per RN. On minimal vent settings, possible extubation trial today. HD tomorrow per renal. No growth on blood cultures. Bronch culture with Pseudomonas aeruginosa, pending susceptibilities.  10/11: extubated but wheezing, mucous plug, aggressive PT, awaiting pseudomonas susceptibilities    10/18: Afebrile today. T-max 100.6 F - 100.9 F last 2 days. Remains intubated, on minimal sedation, and one vasopressor. Basurto an episode of desaturation. He is on 90%FiO2. On HD today. No excessive oral secretions or diarrhea as per bedside RN. PsAg and Steno susceptibilities reviewed. MRSA screen negative on 10/10  10/22: nearing end of antibiotics, continue agressive pulmonary toilet, tracheostomy likely this week      Impression:  1. Multifocal pneumonia- Dense RLL consolidation  2. Acute respiratory failure requiring intubation  3. Septic shock requiring vasopressor support  4. Rising leukocytosis and low grade fevers  5. ESRD on HD. Prior CVA, s/p PEG, Prior trach and decannulation, IDDM2       Recommendations:  -Continue levofloxacin 500 mg IV q48 hr to stop today   -No clear indication for anti fungal and anaerobic coverage  -Wean ventilatory support per protocol  -Off loading, frequent turns, nutritional optimization to prevent bed sores  -Continue hemodialysis as per renal       Discussed plan with [facility]      Nelson Magallon, DO  Chief, Infectious Disease at [facility]  Reachable via Microsoft Teams or ID office: [phone]  Weekdays: After 5pm, please call [phone] for all inquiries and new consults  Weekends: Message on-call infectious disease physician via teams (see Ayah)

## 2024-10-21 NOTE — PROGRESS NOTE ADULT - SUBJECTIVE AND OBJECTIVE BOX
INTERVAL HPI/OVERNIGHT EVENTS:   HPI:  Megha Art is a 73 year old male with PMHx of HTN, IDDM2, ESRD on HD (M/W/F, through RUE AV fistula), hx of CVA x 2 (with residual R. sided weakness and dysphagia s/p PEG tube, previously with tracheostomy and now removed), and hx of RLE DVT (completed apixaban course) who presented to the ED on 10/4/24 for complaints of cough and chills.    History obtained from wife at bedside. As per wife, patient typically coughs at baseline since he previously had a tracheostomy. However, for the past 2-3 days, he has been coughing more frequently and it has been productive with yellow phlegm. Wife was assisting him with getting dressed this afternoon around 1 PM when he felt nauseous and started complaining of abdominal pain. Then had a bowel movement. When wife was cleaning him up, patient was shaking "like he had chills" and she thinks his eyes rolled back for a few seconds. No loss of consciousness. EMS was called and another episode of shaking upon EMS arrival. Of note, patient is due for dialysis today but did not receive it since he came to the ER today. Baseline functional status is wheelchair bound and dependent with all ADLs. NPO with Nepro q4. Lives at home with wife.     In the ED, Tmax 101.3, HR as elevated as 118, BP as elevated as 201/68, and SpO2 as low as 91% on room air. Initially placed on 15L NRB with improvement of SpO2 to 95%. Now on room air. WBC 17.38K, hgb 9.9, sodium 129, BUN 61, Cr 4.05, alkphos 169. Lactic acid 3.6. CT head without evidence of acute hemorrhage mass or mass effect but with encephalomalacia and gliosis again seen involving the bifrontal region. CT CAP with pneumonia and rectum distended with stool. Received  cc bolus, vancomycin 1 g IV, zosyn 3.375 g IV, acetaminophen 1 g IV, hydralazine 10 mg IV, pantoprazole 40 mg IV, and ondansetron 4 mg IV. Evaluated by nephrology who is planning for HD tomorrow. (04 Oct 2024 22:03)      CENTRAL LINE: [ ] YES [ ] NO  LOCATION:   DATE INSERTED:  REMOVE: [ ] YES [ ] NO  EXPLAIN:    MCGILL: [ ] YES [ ] NO    DATE INSERTED:  REMOVE:  [ ] YES [ ] NO  EXPLAIN:    A-LINE:  [ ] YES [ ] NO  LOCATION:   DATE INSERTED:  REMOVE:  [ ] YES [ ] NO  EXPLAIN:    PAST MEDICAL & SURGICAL HISTORY:  ESRD on hemodialysis      HTN (hypertension)      Insulin dependent type 2 diabetes mellitus      History of cerebrovascular accident (CVA) with residual deficit      History of DVT of lower extremity      Arteriovenous fistula      S/P percutaneous endoscopic gastrostomy (PEG) tube placement      History of tracheostomy          REVIEW OF SYSTEMS:    Negative ROS aside from HPI/Interval events above.    ICU Vital Signs Last 24 Hrs  T(C): 36.5 (21 Oct 2024 12:00), Max: 37.1 (21 Oct 2024 09:00)  T(F): 97.7 (21 Oct 2024 12:00), Max: 98.8 (21 Oct 2024 09:00)  HR: 84 (21 Oct 2024 12:25) (69 - 90)  BP: 106/57 (21 Oct 2024 12:00) (105/54 - 187/94)  BP(mean): 70 (21 Oct 2024 12:00) (68 - 115)  ABP: --  ABP(mean): --  RR: 20 (21 Oct 2024 12:00) (15 - 21)  SpO2: 97% (21 Oct 2024 12:25) (89% - 100%)    O2 Parameters below as of 20 Oct 2024 19:01  Patient On (Oxygen Delivery Method): ventilator                I&O's Detail    20 Oct 2024 07:01  -  21 Oct 2024 07:00  --------------------------------------------------------  IN:    FentaNYL: 24.3 mL    IV PiggyBack: 100 mL    Nepro with Carb Steady: 60 mL    Phenylephrine: 21.6 mL  Total IN: 205.9 mL    OUT:    Other (mL): 0 mL  Total OUT: 0 mL    Total NET: 205.9 mL      21 Oct 2024 07:01  -  21 Oct 2024 13:22  --------------------------------------------------------  IN:    Nepro with Carb Steady: 20 mL  Total IN: 20 mL    OUT:  Total OUT: 0 mL    Total NET: 20 mL          Mode: AC/ CMV (Assist Control/ Continuous Mandatory Ventilation)  RR (machine): 16  TV (machine): 400  FiO2: 35  PEEP: 8  ITime: 0.9  MAP: 11  PIP: 23    CAPILLARY BLOOD GLUCOSE      POCT Blood Glucose.: 199 mg/dL (21 Oct 2024 11:04)  POCT Blood Glucose.: 185 mg/dL (21 Oct 2024 06:00)  POCT Blood Glucose.: 184 mg/dL (20 Oct 2024 23:47)  POCT Blood Glucose.: 202 mg/dL (20 Oct 2024 17:33)        PHYSICAL EXAM:    Gen: lying comfortably in bed in no apparent distress  Mouth: (+) ETT  Lungs: B/l rhonchi  Heart: RRR  Abd: Soft/+BS/ Non-tender  Ext: Atrophy LE  Neuro: Awakens. responds with nods.      LABS:                        8.9    18.45 )-----------( 239      ( 21 Oct 2024 03:55 )             26.7      10-21    138  |  101  |  57[H]  ----------------------------<  166[H]  4.0   |  23  |  4.80[H]    Ca    9.5      21 Oct 2024 03:55  Phos  4.7     10-21  Mg     2.4     10-21    TPro  7.6  /  Alb  1.8[L]  /  TBili  0.5  /  DBili  x   /  AST  748[H]  /  ALT  935[H]  /  AlkPhos  161[H]  10-21      Urinalysis Basic - ( 21 Oct 2024 03:55 )    Color: x / Appearance: x / SG: x / pH: x  Gluc: 166 mg/dL / Ketone: x  / Bili: x / Urobili: x   Blood: x / Protein: x / Nitrite: x   Leuk Esterase: x / RBC: x / WBC x   Sq Epi: x / Non Sq Epi: x / Bacteria: x

## 2024-10-21 NOTE — CONSULT NOTE ADULT - REASON FOR ADMISSION
Sepsis and acute hypoxic respiratory failure secondary to suspected aspiration PNA vs. HCAP

## 2024-10-21 NOTE — CONSULT NOTE ADULT - PROVIDER SPECIALTY LIST ADULT
Infectious Disease
Infectious Disease
Nephrology
Critical Care
Surgery
Nephrology
Palliative Care
Pulmonology

## 2024-10-21 NOTE — CONSULT NOTE ADULT - CONSULT REASON
ESRD care
ESRD
Hypoxemia
tracheostomy placement
Pneumonia
Hypoxia
pneumonia
respiratory failure, functional quad, ESRD on HD, GOC

## 2024-10-22 LAB
ALBUMIN SERPL ELPH-MCNC: 1.8 G/DL — LOW (ref 3.3–5)
ALP SERPL-CCNC: 166 U/L — HIGH (ref 40–120)
ALT FLD-CCNC: 660 U/L — HIGH (ref 12–78)
ANION GAP SERPL CALC-SCNC: 17 MMOL/L — SIGNIFICANT CHANGE UP (ref 5–17)
AST SERPL-CCNC: 395 U/L — HIGH (ref 15–37)
BASOPHILS # BLD AUTO: 0 K/UL — SIGNIFICANT CHANGE UP (ref 0–0.2)
BASOPHILS NFR BLD AUTO: 0 % — SIGNIFICANT CHANGE UP (ref 0–2)
BILIRUB SERPL-MCNC: 0.5 MG/DL — SIGNIFICANT CHANGE UP (ref 0.2–1.2)
BUN SERPL-MCNC: 67 MG/DL — HIGH (ref 7–23)
BURR CELLS BLD QL SMEAR: PRESENT — SIGNIFICANT CHANGE UP
CALCIUM SERPL-MCNC: 9.3 MG/DL — SIGNIFICANT CHANGE UP (ref 8.5–10.1)
CHLORIDE SERPL-SCNC: 100 MMOL/L — SIGNIFICANT CHANGE UP (ref 96–108)
CO2 SERPL-SCNC: 20 MMOL/L — LOW (ref 22–31)
CREAT SERPL-MCNC: 5.81 MG/DL — HIGH (ref 0.5–1.3)
EGFR: 10 ML/MIN/1.73M2 — LOW
EOSINOPHIL # BLD AUTO: 0 K/UL — SIGNIFICANT CHANGE UP (ref 0–0.5)
EOSINOPHIL NFR BLD AUTO: 0 % — SIGNIFICANT CHANGE UP (ref 0–6)
GLUCOSE BLDC GLUCOMTR-MCNC: 160 MG/DL — HIGH (ref 70–99)
GLUCOSE BLDC GLUCOMTR-MCNC: 226 MG/DL — HIGH (ref 70–99)
GLUCOSE BLDC GLUCOMTR-MCNC: 226 MG/DL — HIGH (ref 70–99)
GLUCOSE BLDC GLUCOMTR-MCNC: 245 MG/DL — HIGH (ref 70–99)
GLUCOSE SERPL-MCNC: 192 MG/DL — HIGH (ref 70–99)
HCT VFR BLD CALC: 23.9 % — LOW (ref 39–50)
HGB BLD-MCNC: 8 G/DL — LOW (ref 13–17)
HYPOCHROMIA BLD QL: SIGNIFICANT CHANGE UP
LYMPHOCYTES # BLD AUTO: 1.06 K/UL — SIGNIFICANT CHANGE UP (ref 1–3.3)
LYMPHOCYTES # BLD AUTO: 6 % — LOW (ref 13–44)
MAGNESIUM SERPL-MCNC: 2.5 MG/DL — SIGNIFICANT CHANGE UP (ref 1.6–2.6)
MANUAL SMEAR VERIFICATION: SIGNIFICANT CHANGE UP
MCHC RBC-ENTMCNC: 23.4 PG — LOW (ref 27–34)
MCHC RBC-ENTMCNC: 33.5 G/DL — SIGNIFICANT CHANGE UP (ref 32–36)
MCV RBC AUTO: 69.9 FL — LOW (ref 80–100)
MONOCYTES # BLD AUTO: 0 K/UL — SIGNIFICANT CHANGE UP (ref 0–0.9)
MONOCYTES NFR BLD AUTO: 0 % — LOW (ref 2–14)
NEUTROPHILS # BLD AUTO: 16.68 K/UL — HIGH (ref 1.8–7.4)
NEUTROPHILS NFR BLD AUTO: 94 % — HIGH (ref 43–77)
NRBC # BLD: 1 /100 WBCS — HIGH (ref 0–0)
NRBC # BLD: SIGNIFICANT CHANGE UP /100 WBCS (ref 0–0)
PHOSPHATE SERPL-MCNC: 4.4 MG/DL — SIGNIFICANT CHANGE UP (ref 2.5–4.5)
PLAT MORPH BLD: NORMAL — SIGNIFICANT CHANGE UP
PLATELET # BLD AUTO: 257 K/UL — SIGNIFICANT CHANGE UP (ref 150–400)
POIKILOCYTOSIS BLD QL AUTO: SIGNIFICANT CHANGE UP
POTASSIUM SERPL-MCNC: 3.6 MMOL/L — SIGNIFICANT CHANGE UP (ref 3.5–5.3)
POTASSIUM SERPL-SCNC: 3.6 MMOL/L — SIGNIFICANT CHANGE UP (ref 3.5–5.3)
PROT SERPL-MCNC: 7.3 GM/DL — SIGNIFICANT CHANGE UP (ref 6–8.3)
RBC # BLD: 3.42 M/UL — LOW (ref 4.2–5.8)
RBC # FLD: 23.9 % — HIGH (ref 10.3–14.5)
RBC BLD AUTO: ABNORMAL
SODIUM SERPL-SCNC: 137 MMOL/L — SIGNIFICANT CHANGE UP (ref 135–145)
TARGETS BLD QL SMEAR: SIGNIFICANT CHANGE UP
WBC # BLD: 17.74 K/UL — HIGH (ref 3.8–10.5)
WBC # FLD AUTO: 17.74 K/UL — HIGH (ref 3.8–10.5)

## 2024-10-22 PROCEDURE — 99291 CRITICAL CARE FIRST HOUR: CPT

## 2024-10-22 PROCEDURE — 71045 X-RAY EXAM CHEST 1 VIEW: CPT | Mod: 26

## 2024-10-22 RX ORDER — HYDRALAZINE HYDROCHLORIDE 50 MG/1
10 TABLET, FILM COATED ORAL ONCE
Refills: 0 | Status: COMPLETED | OUTPATIENT
Start: 2024-10-22 | End: 2024-10-22

## 2024-10-22 RX ADMIN — CHLORHEXIDINE GLUCONATE 15 MILLILITER(S): 40 SOLUTION TOPICAL at 04:54

## 2024-10-22 RX ADMIN — SODIUM CHLORIDE 4 MILLILITER(S): 9 INJECTION, SOLUTION INTRAMUSCULAR; INTRAVENOUS; SUBCUTANEOUS at 17:48

## 2024-10-22 RX ADMIN — Medication 1 TABLET(S): at 11:29

## 2024-10-22 RX ADMIN — SODIUM CHLORIDE 4 MILLILITER(S): 9 INJECTION, SOLUTION INTRAMUSCULAR; INTRAVENOUS; SUBCUTANEOUS at 11:25

## 2024-10-22 RX ADMIN — IPRATROPIUM BROMIDE AND ALBUTEROL SULFATE 3 MILLILITER(S): .5; 2.5 SOLUTION RESPIRATORY (INHALATION) at 00:30

## 2024-10-22 RX ADMIN — POLYETHYLENE GLYCOL 3350 17 GRAM(S): 17 POWDER, FOR SOLUTION ORAL at 11:28

## 2024-10-22 RX ADMIN — Medication 4: at 11:29

## 2024-10-22 RX ADMIN — Medication 4: at 06:52

## 2024-10-22 RX ADMIN — SODIUM CHLORIDE 4 MILLILITER(S): 9 INJECTION, SOLUTION INTRAMUSCULAR; INTRAVENOUS; SUBCUTANEOUS at 06:06

## 2024-10-22 RX ADMIN — CHLORHEXIDINE GLUCONATE 1 APPLICATION(S): 40 SOLUTION TOPICAL at 04:54

## 2024-10-22 RX ADMIN — Medication 300 MILLIGRAM(S): at 11:28

## 2024-10-22 RX ADMIN — PANTOPRAZOLE SODIUM 40 MILLIGRAM(S): 40 TABLET, DELAYED RELEASE ORAL at 11:28

## 2024-10-22 RX ADMIN — HEPARIN SODIUM 5000 UNIT(S): 10000 INJECTION INTRAVENOUS; SUBCUTANEOUS at 18:17

## 2024-10-22 RX ADMIN — Medication 2: at 18:17

## 2024-10-22 RX ADMIN — HEPARIN SODIUM 5000 UNIT(S): 10000 INJECTION INTRAVENOUS; SUBCUTANEOUS at 04:55

## 2024-10-22 RX ADMIN — IPRATROPIUM BROMIDE AND ALBUTEROL SULFATE 3 MILLILITER(S): .5; 2.5 SOLUTION RESPIRATORY (INHALATION) at 17:48

## 2024-10-22 RX ADMIN — Medication 2 TABLET(S): at 22:35

## 2024-10-22 RX ADMIN — IPRATROPIUM BROMIDE AND ALBUTEROL SULFATE 3 MILLILITER(S): .5; 2.5 SOLUTION RESPIRATORY (INHALATION) at 11:25

## 2024-10-22 RX ADMIN — IPRATROPIUM BROMIDE AND ALBUTEROL SULFATE 3 MILLILITER(S): .5; 2.5 SOLUTION RESPIRATORY (INHALATION) at 06:06

## 2024-10-22 RX ADMIN — SODIUM CHLORIDE 4 MILLILITER(S): 9 INJECTION, SOLUTION INTRAMUSCULAR; INTRAVENOUS; SUBCUTANEOUS at 00:30

## 2024-10-22 RX ADMIN — CHLORHEXIDINE GLUCONATE 15 MILLILITER(S): 40 SOLUTION TOPICAL at 18:18

## 2024-10-22 NOTE — PROGRESS NOTE ADULT - SUBJECTIVE AND OBJECTIVE BOX
Patient seen and examined at bedside in ICU with Dr. Kelly.    PEG feeds running at bedside.   Off vasopressors.         Vital Signs Last 24 Hrs  T(F): 98.8 (10-22-24 @ 09:00), Max: 99 (10-21-24 @ 22:00)  HR: 85 (10-22-24 @ 09:00)  BP: 167/77 (10-22-24 @ 09:00)  RR: 25 (10-22-24 @ 09:00)  SpO2: 98% (10-22-24 @ 09:00)  Wt(kg): --   CAPILLARY BLOOD GLUCOSE      POCT Blood Glucose.: 245 mg/dL (22 Oct 2024 06:48)      GENERAL: NAD, intubated  CHEST/LUNG: Respirations non labored, good inspiratory effort, breathing synchronously with ventilator   HEART: S1S2  HEENT: NCAT, EOMI, conjunctiva clear   ABDOMEN: Nondistended, + bowel sounds, PEG tube in place with TF running at bedside   EXTREMITIES:  No calf tenderness, no edema    I&O's Detail    21 Oct 2024 07:01  -  22 Oct 2024 07:00  --------------------------------------------------------  IN:    Enteral Tube Flush: 75 mL    Nepro with Carb Steady: 320 mL  Total IN: 395 mL    OUT:    Rectal Tube (mL): 200 mL  Total OUT: 200 mL    Total NET: 195 mL          LABS:                        8.0    17.74 )-----------( 257      ( 22 Oct 2024 03:10 )             23.9     10-22    137  |  100  |  67[H]  ----------------------------<  192[H]  3.6   |  20[L]  |  5.81[H]    Ca    9.3      22 Oct 2024 03:10  Phos  4.4     10-22  Mg     2.5     10-22    TPro  7.3  /  Alb  1.8[L]  /  TBili  0.5  /  DBili  x   /  AST  395[H]  /  ALT  660[H]  /  AlkPhos  166[H]  10-22

## 2024-10-22 NOTE — PROGRESS NOTE ADULT - ASSESSMENT
74 Y/O male intubated in ICU with respiratory failure.   Tracheostomy planning in OR for Wednesday. Consent in chart.   Afebrile with leukocytosis improving on Levaquin.       PLAN:     - OR planning for tracheostomy creation on Wednesday   - Hold TF for NPO at [facility]  - Preoperative labs to be drawn Wednesday AM   - HD per nephrology   - Continue ABx per ID   - Continue care per ICU   - Discussed with Dr. Kelly

## 2024-10-22 NOTE — CHART NOTE - NSCHARTNOTEFT_GEN_A_CORE
Assessment:  Pt seen in ICU, on vent, receiving HD.  Pt adm c diagnosis of pneumonia, ESRD on HD, acute respiratory failure, septic shock, for trach tomorrow.  Pt is full code.   Pt c recurrent aspirations following extubation and required reintubation.  PMH include DVT, CVA, DM Type 2 on insulin, HTN, ESRD on HD, dysphagia, PEG, bolus feeding regimen.    Factors impacting intake: [ ] none [ ] nausea  [ ] vomiting [ x] diarrhea; rectal tube in place [ ] constipation  [ ]chewing problems [ ] swallowing issues  [x ] other: on G-Tube feeding    Diet Prescription: Diet, NPO with Tube Feed:   Tube Feeding Modality: Gastrostomy  Nepro with Carb Steady  Total Volume for 24 Hours (mL): 900  Continuous  Starting Tube Feed Rate {mL per Hour}: 20  Increase Tube Feed Rate by (mL): 5     Every 12 hours  Until Goal Tube Feed Rate (mL per Hour): 50  Tube Feed Duration (in Hours): 18  Tube Feed Start Time: 17:00  Tube Feed Stop Time: 11:00 (10-21-24 @ 09:39)    Intake: <50% nutrition needs > 5 days  Enteral feeding order was on trickle feeding previously and changed as per GOC from plan for palliative extubation to full code.  Nepro @ 25 ml/hr, to be progressed as per order above to goal rate of 50 ml/hr x 18 hours bzht=046 ml, 620 calories, 73 grams protein, 654 ml free water     Current Weight: 10/22, 40.4 kg, 10/16, 40.9 kg, 10/05, 43.5 kg (drug dose/adm wt.), c wt. loss of 3.1 kg since adm  % Weight Change: 7.1% in < 1 month    Pertinent Medications: MEDICATIONS  (STANDING):  albuterol/ipratropium for Nebulization 3 milliLiter(s) Nebulizer every 6 hours  chlorhexidine 0.12% Liquid 15 milliLiter(s) Oral Mucosa every 12 hours  chlorhexidine 2% Cloths 1 Application(s) Topical <User Schedule>  ferrous    sulfate Liquid 300 milliGRAM(s) Enteral Tube daily  heparin   Injectable 5000 Unit(s) SubCutaneous every 12 hours  insulin lispro (ADMELOG) corrective regimen sliding scale   SubCutaneous every 6 hours  Nephro-noam 1 Tablet(s) Oral daily  pantoprazole   Suspension 40 milliGRAM(s) Enteral Tube daily  polyethylene glycol 3350 17 Gram(s) Oral daily  senna 2 Tablet(s) Oral at bedtime  sodium chloride 3%  Inhalation 4 milliLiter(s) Inhalation every 6 hours    MEDICATIONS  (PRN):  HYDROmorphone  Injectable 0.5 milliGRAM(s) IV Push every 4 hours PRN Severe Pain (7 - 10)    Pertinent Labs: 10-22 Na137 mmol/L Glu 192 mg/dL[H] K+ 3.6 mmol/L Cr  5.81 mg/dL[H] BUN 67 mg/dL[H] 10-22 Phos 4.4 mg/dL 10-22 Alb 1.8 g/dL[L] 10-18 Chol --    LDL --    HDL --    Trig 231 mg/dL[H]10-22  U/L[H]  U/L[H] Alkaline Phosphatase 166 U/L[H]  10-05-24 @ 08:37 a1c 4.8   CAPILLARY BLOOD GLUCOSE      POCT Blood Glucose.: 226 mg/dL (22 Oct 2024 11:15)  POCT Blood Glucose.: 245 mg/dL (22 Oct 2024 06:48)  POCT Blood Glucose.: 196 mg/dL (21 Oct 2024 22:58)  POCT Blood Glucose.: 218 mg/dL (21 Oct 2024 17:10)    Skin:   Pressure Injury x 2  1. 10/20, sacrum; suspected deep tissue injury   2. 10/20, right buttock; suspected deep tissue injury     Estimated Needs:   [x ] no change since previous assessment(10/06)  [ ] recalculated:     Previous Nutrition Diagnosis: (10/06)  Malnutrition: Severe Malnutrition in context of chronic illness  Etiology: Inadequate energy/protein intake + increased needs related to ESRD on HD, CVA x 2, hx of dysphagia, recurrent aspiration PNA  Signs/Symptoms: Physical findings of severe fat/muscle loss as noted; 12.7% wt loss x 5 months  Goal/Expected Outcome: Pt to meet >75% energy/protein needs via tube feeding & supplement during LOS; not met   Nutrition Diagnosis is [ ] ongoing  [ ] resolved [x ] not applicable (being replaced by new similar diagnosis due to change in acuity)      New Nutrition Diagnosis: (10/22)  [x ] Malnutrition; severe malnutrition in context of acute illness   Related to: inadequate protein-energy intake in setting of acute respiratory failure, septic shock, pneumonia, pressure ulcer  As evidenced by: <50% nutrition needs > 5 days, >5% wt. loss in 1 month  Goal: pt to meet >75% protein-energy needs via enteral feeing; not met    Interventions:   Recommend  [ ] Change Diet To:  [ ] Nutrition Supplement  [ x] Nutrition Support; Continue c current enteral feeding regimen, add Liquid protein supplement 1 pkg=15 grams protein, 100 calories via G-Tube   [ ] Other:       Monitoring and Evaluation:   [ ] PO intake [ x ] Tolerance to diet prescription [ x ] weights [ x ] labs[ x ] follow up per protocol  [ ] other:

## 2024-10-22 NOTE — PROGRESS NOTE ADULT - SUBJECTIVE AND OBJECTIVE BOX
INTERVAL HPI/OVERNIGHT EVENTS:   HPI:  Megha Art is a 73 year old male with PMHx of HTN, IDDM2, ESRD on HD (M/W/F, through RUE AV fistula), hx of CVA x 2 (with residual R. sided weakness and dysphagia s/p PEG tube, previously with tracheostomy and now removed), and hx of RLE DVT (completed apixaban course) who presented to the ED on 10/4/24 for complaints of cough and chills.    History obtained from wife at bedside. As per wife, patient typically coughs at baseline since he previously had a tracheostomy. However, for the past 2-3 days, he has been coughing more frequently and it has been productive with yellow phlegm. Wife was assisting him with getting dressed this afternoon around 1 PM when he felt nauseous and started complaining of abdominal pain. Then had a bowel movement. When wife was cleaning him up, patient was shaking "like he had chills" and she thinks his eyes rolled back for a few seconds. No loss of consciousness. EMS was called and another episode of shaking upon EMS arrival. Of note, patient is due for dialysis today but did not receive it since he came to the ER today. Baseline functional status is wheelchair bound and dependent with all ADLs. NPO with Nepro q4. Lives at home with wife.     In the ED, Tmax 101.3, HR as elevated as 118, BP as elevated as 201/68, and SpO2 as low as 91% on room air. Initially placed on 15L NRB with improvement of SpO2 to 95%. Now on room air. WBC 17.38K, hgb 9.9, sodium 129, BUN 61, Cr 4.05, alkphos 169. Lactic acid 3.6. CT head without evidence of acute hemorrhage mass or mass effect but with encephalomalacia and gliosis again seen involving the bifrontal region. CT CAP with pneumonia and rectum distended with stool. Received  cc bolus, vancomycin 1 g IV, zosyn 3.375 g IV, acetaminophen 1 g IV, hydralazine 10 mg IV, pantoprazole 40 mg IV, and ondansetron 4 mg IV. Evaluated by nephrology who is planning for HD tomorrow. (04 Oct 2024 22:03)      CENTRAL LINE: [ ] YES [ ] NO  LOCATION:   DATE INSERTED:  REMOVE: [ ] YES [ ] NO  EXPLAIN:    MCGILL: [ ] YES [ ] NO    DATE INSERTED:  REMOVE:  [ ] YES [ ] NO  EXPLAIN:    A-LINE:  [ ] YES [ ] NO  LOCATION:   DATE INSERTED:  REMOVE:  [ ] YES [ ] NO  EXPLAIN:    PAST MEDICAL & SURGICAL HISTORY:  ESRD on hemodialysis      HTN (hypertension)      Insulin dependent type 2 diabetes mellitus      History of cerebrovascular accident (CVA) with residual deficit      History of DVT of lower extremity      Arteriovenous fistula      S/P percutaneous endoscopic gastrostomy (PEG) tube placement      History of tracheostomy          REVIEW OF SYSTEMS:    Negative ROS aside from HPI/Interval events above.    ICU Vital Signs Last 24 Hrs  T(C): 37.3 (22 Oct 2024 10:00), Max: 37.3 (22 Oct 2024 10:00)  T(F): 99.1 (22 Oct 2024 10:00), Max: 99.1 (22 Oct 2024 10:00)  HR: 80 (22 Oct 2024 10:00) (75 - 93)  BP: 175/78 (22 Oct 2024 10:00) (106/57 - 191/74)  BP(mean): 103 (22 Oct 2024 10:00) (70 - 105)  ABP: --  ABP(mean): --  RR: 21 (22 Oct 2024 10:00) (17 - 26)  SpO2: 98% (22 Oct 2024 10:00) (87% - 100%)    O2 Parameters below as of 22 Oct 2024 06:06  Patient On (Oxygen Delivery Method): ventilator                I&O's Detail    21 Oct 2024 07:01  -  22 Oct 2024 07:00  --------------------------------------------------------  IN:    Enteral Tube Flush: 75 mL    Nepro with Carb Steady: 320 mL  Total IN: 395 mL    OUT:    Rectal Tube (mL): 200 mL  Total OUT: 200 mL    Total NET: 195 mL      22 Oct 2024 07:01  -  22 Oct 2024 11:24  --------------------------------------------------------  IN:    Nepro with Carb Steady: 75 mL  Total IN: 75 mL    OUT:  Total OUT: 0 mL    Total NET: 75 mL          Mode: AC/ CMV (Assist Control/ Continuous Mandatory Ventilation)  RR (machine): 16  TV (machine): 400  FiO2: 35  PEEP: 8  ITime: 0.9  MAP: 13  PIP: 24    CAPILLARY BLOOD GLUCOSE      POCT Blood Glucose.: 226 mg/dL (22 Oct 2024 11:15)  POCT Blood Glucose.: 245 mg/dL (22 Oct 2024 06:48)  POCT Blood Glucose.: 196 mg/dL (21 Oct 2024 22:58)  POCT Blood Glucose.: 218 mg/dL (21 Oct 2024 17:10)        PHYSICAL EXAM:    Gen: lying comfortably in bed in no apparent distress  Mouth: (+) ETT  Lungs: B/l rhonchi  Heart: RRR  Abd: Soft/+BS/ Non-tender  Ext: Atrophy LE  Neuro: Awakens. responds with nods.      LABS:                        8.0    17.74 )-----------( 257      ( 22 Oct 2024 03:10 )             23.9      10-22    137  |  100  |  67[H]  ----------------------------<  192[H]  3.6   |  20[L]  |  5.81[H]    Ca    9.3      22 Oct 2024 03:10  Phos  4.4     10-22  Mg     2.5     10-22    TPro  7.3  /  Alb  1.8[L]  /  TBili  0.5  /  DBili  x   /  AST  395[H]  /  ALT  660[H]  /  AlkPhos  166[H]  10-22      Urinalysis Basic - ( 22 Oct 2024 03:10 )    Color: x / Appearance: x / SG: x / pH: x  Gluc: 192 mg/dL / Ketone: x  / Bili: x / Urobili: x   Blood: x / Protein: x / Nitrite: x   Leuk Esterase: x / RBC: x / WBC x   Sq Epi: x / Non Sq Epi: x / Bacteria: x

## 2024-10-22 NOTE — PROGRESS NOTE ADULT - SUBJECTIVE AND OBJECTIVE BOX
BALDO JIMMY  73y  Patient is a 73y old  Male who presents with a chief complaint of Sepsis and acute hypoxic respiratory failure secondary to suspected aspiration PNA vs. HCAP (22 Oct 2024 11:24)    HPI:  Seen and examined. Intubated but opens eyes.    HEALTH ISSUES - PROBLEM Dx:  Sepsis due to pneumonia    Acute respiratory failure with hypoxia    Hypertensive urgency    Constipation    Lactic acidosis    Insulin dependent type 2 diabetes mellitus    ESRD on hemodialysis    History of cerebrovascular accident (CVA) with residual deficit    History of DVT of lower extremity    Microcytic anemia    Hypoxia    Severe protein-calorie malnutrition    Encounter for palliative care          MEDICATIONS  (STANDING):  albuterol/ipratropium for Nebulization 3 milliLiter(s) Nebulizer every 6 hours  chlorhexidine 0.12% Liquid 15 milliLiter(s) Oral Mucosa every 12 hours  chlorhexidine 2% Cloths 1 Application(s) Topical <User Schedule>  ferrous    sulfate Liquid 300 milliGRAM(s) Enteral Tube daily  heparin   Injectable 5000 Unit(s) SubCutaneous every 12 hours  insulin lispro (ADMELOG) corrective regimen sliding scale   SubCutaneous every 6 hours  Nephro-noam 1 Tablet(s) Oral daily  pantoprazole   Suspension 40 milliGRAM(s) Enteral Tube daily  polyethylene glycol 3350 17 Gram(s) Oral daily  senna 2 Tablet(s) Oral at bedtime  sodium chloride 3%  Inhalation 4 milliLiter(s) Inhalation every 6 hours    MEDICATIONS  (PRN):  HYDROmorphone  Injectable 0.5 milliGRAM(s) IV Push every 4 hours PRN Severe Pain (7 - 10)    Vital Signs Last 24 Hrs  T(C): 36.9 (22 Oct 2024 19:46), Max: 37.7 (22 Oct 2024 14:00)  T(F): 98.4 (22 Oct 2024 19:46), Max: 99.9 (22 Oct 2024 14:00)  HR: 89 (22 Oct 2024 19:46) (77 - 102)  BP: 169/74 (22 Oct 2024 18:20) (141/62 - 191/74)  BP(mean): 95 (22 Oct 2024 18:20) (85 - 105)  RR: 14 (22 Oct 2024 19:46) (14 - 26)  SpO2: 99% (22 Oct 2024 18:20) (88% - 100%)    Parameters below as of 22 Oct 2024 18:20  Patient On (Oxygen Delivery Method): ventilator      Daily     Daily Weight in k.2 (22 Oct 2024 18:20)    PHYSICAL EXAM:  Constitutional: He appears comfortable and not distressed. Not diaphoretic.    Neck:  The thyroid is normal. Trachea is midline.     Breasts: Normal examination.    Respiratory: The lungs are clear to auscultation. No dullness and expansion is normal.    Cardiovascular: S1 and S2 are normal. No mummurs, rubs or gallops are present.    Gastrointestinal: The abdomen is soft. No tenderness is present. No masses are present. Bowel sounds are normal.    Genitourinary: The bladder is not distended. No CVA tenderness is present.    Extremities: No edema is noted. No deformities are present.    Neurological: Lethargic.    Skin: No leasions are seen  or palpated.    Lymph Nodes: No lymphadenopathy is present.                                8.0    17.74 )-----------( 257      ( 22 Oct 2024 03:10 )             23.9     10-    137  |  100  |  67[H]  ----------------------------<  192[H]  3.6   |  20[L]  |  5.81[H]    Ca    9.3      22 Oct 2024 03:10  Phos  4.4     10-22  Mg     2.5     10-22    TPro  7.3  /  Alb  1.8[L]  /  TBili  0.5  /  DBili  x   /  AST  395[H]  /  ALT  660[H]  /  AlkPhos  166[H]  10-22    Urinalysis Basic - ( 22 Oct 2024 03:10 )    Color: x / Appearance: x / SG: x / pH: x  Gluc: 192 mg/dL / Ketone: x  / Bili: x / Urobili: x   Blood: x / Protein: x / Nitrite: x   Leuk Esterase: x / RBC: x / WBC x   Sq Epi: x / Non Sq Epi: x / Bacteria: x

## 2024-10-22 NOTE — PROGRESS NOTE ADULT - ASSESSMENT
ESRD:  On HD TIW, last on Saturday.  - HD TIW.   - Will discuss prognosis with his wife.  - Limit UF due to Hypotension.  - Midodrine as needed.      Anemia:  - Transfuse for Hg < 7.  - Continue EPO.    Respiratory Failure:  Failed extubation.  - Tracheostomy as planned    Otf Mena MD  Nephrology

## 2024-10-22 NOTE — PROGRESS NOTE ADULT - ASSESSMENT
72 yo M with h/o HTN, DM2, ESRD on HD (MWF, RUE AV Fistula), CVA with residual R sided weakness and dysphagia s/p peg, s/p trach since decannulated, RLE DVT (completed course of Eliquis) admitted with 1) RLL PNA with course complicated by acute hypoxic respiratory failure ultimately requiring intubation, failed trial of extubation and was subsequently reintubated 10/14 2) Septic shock 2 to PNA    Resp/GI/Social: Pt is full code/ Trach tomorrow in OR.  ID: Abx as per ID  CVS: Phenylephrine to maintain MAP >65   HEME: DVT prophylaxis with sq Heparin  FEN: Severe protein-calorie malnutrition and Underweight (BMI < 19); cont enteral feeds  Endo: Follow FS and cover with Lispro according to set parameters  Renal: HD as per Renal  Neuro: Light sedation/analgesia for pain and vent synchrony.

## 2024-10-22 NOTE — DIETITIAN NUTRITION RISK NOTIFICATION - TREATMENT: THE FOLLOWING DIET HAS BEEN RECOMMENDED
Diet, NPO with Tube Feed:   Tube Feeding Modality: Gastrostomy  Nepro with Carb Steady  Total Volume for 24 Hours (mL): 900  Continuous  Starting Tube Feed Rate {mL per Hour}: 25  Increase Tube Feed Rate by (mL): 5     Every 12 hours  Until Goal Tube Feed Rate (mL per Hour): 50  Tube Feed Duration (in Hours): 18  Tube Feed Start Time: 17:00  Tube Feed Stop Time: 11:00  Liquid Protein Supplement     Qty per Day:  1 (10-22-24 @ 16:33) [Pending Verification By Attending]  Diet, NPO with Tube Feed:   Tube Feeding Modality: Gastrostomy  Nepro with Carb Steady  Total Volume for 24 Hours (mL): 900  Continuous  Starting Tube Feed Rate {mL per Hour}: 20  Increase Tube Feed Rate by (mL): 5     Every 12 hours  Until Goal Tube Feed Rate (mL per Hour): 50  Tube Feed Duration (in Hours): 18  Tube Feed Start Time: 17:00  Tube Feed Stop Time: 11:00 (10-21-24 @ 09:39) [Active]      
Diet, NPO with Tube Feed:   Tube Feeding Modality: Gastrostomy  Nepro with Carb Steady  Total Volume for 24 Hours (mL): 948  Bolus  Total Volume of Bolus (mL):  237  Total # of Feeds: 4  Tube Feed Frequency: Every 6 hours   Tube Feed Start Time: 14:00  Bolus Feed Rate (mL per Hour): 237   Bolus Feed Duration (in Hours): 1  Free Water Flush  Bolus   Total Volume per Flush (mL): 150   Frequency: Every 6 Hours   Total Daily Volume of Flush (mL): 600  Free Water Flush Instructions:  Hold H2O flushes until Na levels WDL; then add H2O flushes of 150 ml q 6 hours  Liquid Protein Supplement     Qty per Day:  1 (10-06-24 @ 12:21) [Pending Verification By Attending]  Diet, NPO with Tube Feed:   Tube Feeding Modality: Gastrostomy  Nepro with Carb Steady  Total Volume for 24 Hours (mL): 1422  Bolus  Total Volume of Bolus (mL):  237  Tube Feed Frequency: Every 4 hours   Tube Feed Start Time: 20:30  Bolus Feed Rate (mL per Hour): 0   Bolus Feed Duration (in Hours): 0 (10-04-24 @ 20:25) [Active]

## 2024-10-23 ENCOUNTER — TRANSCRIPTION ENCOUNTER (OUTPATIENT)
Age: 74
End: 2024-10-23

## 2024-10-23 PROBLEM — Z86.2 HISTORY OF ANEMIA: Status: RESOLVED | Noted: 2022-02-17 | Resolved: 2024-10-23

## 2024-10-23 PROBLEM — I82.492 DEEP VEIN THROMBOSIS (DVT) OF OTHER VEIN OF LEFT LOWER EXTREMITY: Status: ACTIVE | Noted: 2024-10-23

## 2024-10-23 PROBLEM — R53.2 FUNCTIONAL QUADRIPLEGIA: Status: ACTIVE | Noted: 2024-10-23

## 2024-10-23 PROBLEM — Z71.89 ACP (ADVANCE CARE PLANNING): Status: ACTIVE | Noted: 2024-10-23

## 2024-10-23 PROBLEM — Z86.2 HISTORY OF LEUKOCYTOSIS: Status: RESOLVED | Noted: 2024-09-03 | Resolved: 2024-10-23

## 2024-10-23 PROBLEM — Z87.19 HISTORY OF UPPER GASTROINTESTINAL HEMORRHAGE: Status: RESOLVED | Noted: 2024-08-06 | Resolved: 2024-10-23

## 2024-10-23 PROBLEM — N18.4 CKD (CHRONIC KIDNEY DISEASE), STAGE IV: Status: RESOLVED | Noted: 2022-06-13 | Resolved: 2024-10-23

## 2024-10-23 PROBLEM — J44.9 CHRONIC OBSTRUCTIVE PULMONARY DISEASE, UNSPECIFIED COPD TYPE: Status: ACTIVE | Noted: 2024-10-23

## 2024-10-23 PROBLEM — N18.6 ESRD (END STAGE RENAL DISEASE) ON DIALYSIS: Status: ACTIVE | Noted: 2024-10-23

## 2024-10-23 LAB
ANION GAP SERPL CALC-SCNC: 13 MMOL/L — SIGNIFICANT CHANGE UP (ref 5–17)
APTT BLD: 28.8 SEC — SIGNIFICANT CHANGE UP (ref 24.5–35.6)
BLD GP AB SCN SERPL QL: SIGNIFICANT CHANGE UP
BUN SERPL-MCNC: 30 MG/DL — HIGH (ref 7–23)
CALCIUM SERPL-MCNC: 9.6 MG/DL — SIGNIFICANT CHANGE UP (ref 8.5–10.1)
CHLORIDE SERPL-SCNC: 99 MMOL/L — SIGNIFICANT CHANGE UP (ref 96–108)
CO2 SERPL-SCNC: 25 MMOL/L — SIGNIFICANT CHANGE UP (ref 22–31)
CREAT SERPL-MCNC: 2.99 MG/DL — HIGH (ref 0.5–1.3)
CULTURE RESULTS: SIGNIFICANT CHANGE UP
EGFR: 22 ML/MIN/1.73M2 — LOW
GLUCOSE BLDC GLUCOMTR-MCNC: 197 MG/DL — HIGH (ref 70–99)
GLUCOSE BLDC GLUCOMTR-MCNC: 202 MG/DL — HIGH (ref 70–99)
GLUCOSE BLDC GLUCOMTR-MCNC: 230 MG/DL — HIGH (ref 70–99)
GLUCOSE BLDC GLUCOMTR-MCNC: 255 MG/DL — HIGH (ref 70–99)
GLUCOSE SERPL-MCNC: 176 MG/DL — HIGH (ref 70–99)
HCT VFR BLD CALC: 23.9 % — LOW (ref 39–50)
HGB BLD-MCNC: 7.9 G/DL — LOW (ref 13–17)
INR BLD: 1.3 RATIO — HIGH (ref 0.85–1.16)
MAGNESIUM SERPL-MCNC: 2.3 MG/DL — SIGNIFICANT CHANGE UP (ref 1.6–2.6)
MCHC RBC-ENTMCNC: 23.6 PG — LOW (ref 27–34)
MCHC RBC-ENTMCNC: 33.1 G/DL — SIGNIFICANT CHANGE UP (ref 32–36)
MCV RBC AUTO: 71.3 FL — LOW (ref 80–100)
NRBC # BLD: 0 /100 WBCS — SIGNIFICANT CHANGE UP (ref 0–0)
PHOSPHATE SERPL-MCNC: 2.5 MG/DL — SIGNIFICANT CHANGE UP (ref 2.5–4.5)
PLATELET # BLD AUTO: 247 K/UL — SIGNIFICANT CHANGE UP (ref 150–400)
POTASSIUM SERPL-MCNC: 3.4 MMOL/L — LOW (ref 3.5–5.3)
POTASSIUM SERPL-SCNC: 3.4 MMOL/L — LOW (ref 3.5–5.3)
PROTHROM AB SERPL-ACNC: 14.7 SEC — HIGH (ref 9.9–13.4)
RBC # BLD: 3.35 M/UL — LOW (ref 4.2–5.8)
RBC # FLD: 23.9 % — HIGH (ref 10.3–14.5)
SODIUM SERPL-SCNC: 137 MMOL/L — SIGNIFICANT CHANGE UP (ref 135–145)
SPECIMEN SOURCE: SIGNIFICANT CHANGE UP
WBC # BLD: 19.58 K/UL — HIGH (ref 3.8–10.5)
WBC # FLD AUTO: 19.58 K/UL — HIGH (ref 3.8–10.5)

## 2024-10-23 PROCEDURE — 31600 PLANNED TRACHEOSTOMY: CPT

## 2024-10-23 PROCEDURE — 99291 CRITICAL CARE FIRST HOUR: CPT

## 2024-10-23 PROCEDURE — 60200 REMOVE THYROID LESION: CPT

## 2024-10-23 PROCEDURE — 71045 X-RAY EXAM CHEST 1 VIEW: CPT | Mod: 26

## 2024-10-23 DEVICE — TRACH DIC CUFF FLEX SHAFT 7.0: Type: IMPLANTABLE DEVICE | Status: FUNCTIONAL

## 2024-10-23 RX ORDER — ERYTHROPOIETIN 10000 [IU]/ML
10000 INJECTION, SOLUTION INTRAVENOUS; SUBCUTANEOUS
Refills: 0 | Status: DISCONTINUED | OUTPATIENT
Start: 2024-10-23 | End: 2024-10-23

## 2024-10-23 RX ORDER — POLYETHYLENE GLYCOL 3350 17 G/17G
17 POWDER, FOR SOLUTION ORAL DAILY
Refills: 0 | Status: DISCONTINUED | OUTPATIENT
Start: 2024-10-23 | End: 2024-10-26

## 2024-10-23 RX ORDER — ERYTHROPOIETIN 10000 [IU]/ML
10000 INJECTION, SOLUTION INTRAVENOUS; SUBCUTANEOUS
Refills: 0 | Status: DISCONTINUED | OUTPATIENT
Start: 2024-10-23 | End: 2024-11-07

## 2024-10-23 RX ORDER — CHLORHEXIDINE GLUCONATE 40 MG/ML
15 SOLUTION TOPICAL EVERY 12 HOURS
Refills: 0 | Status: DISCONTINUED | OUTPATIENT
Start: 2024-10-23 | End: 2024-11-07

## 2024-10-23 RX ORDER — PANTOPRAZOLE SODIUM 40 MG/1
40 TABLET, DELAYED RELEASE ORAL DAILY
Refills: 0 | Status: DISCONTINUED | OUTPATIENT
Start: 2024-10-23 | End: 2024-11-07

## 2024-10-23 RX ORDER — POTASSIUM CHLORIDE 10 MEQ
10 TABLET, EXTENDED RELEASE ORAL ONCE
Refills: 0 | Status: DISCONTINUED | OUTPATIENT
Start: 2024-10-23 | End: 2024-10-23

## 2024-10-23 RX ORDER — POTASSIUM CHLORIDE 10 MEQ
10 TABLET, EXTENDED RELEASE ORAL ONCE
Refills: 0 | Status: COMPLETED | OUTPATIENT
Start: 2024-10-23 | End: 2024-10-23

## 2024-10-23 RX ORDER — SENNA 187 MG
2 TABLET ORAL AT BEDTIME
Refills: 0 | Status: DISCONTINUED | OUTPATIENT
Start: 2024-10-23 | End: 2024-10-26

## 2024-10-23 RX ORDER — HYDROMORPHONE HCL/0.9% NACL/PF 6 MG/30 ML
0.5 PATIENT CONTROLLED ANALGESIA SYRINGE INTRAVENOUS EVERY 4 HOURS
Refills: 0 | Status: DISCONTINUED | OUTPATIENT
Start: 2024-10-23 | End: 2024-10-23

## 2024-10-23 RX ORDER — IPRATROPIUM BROMIDE AND ALBUTEROL SULFATE .5; 2.5 MG/3ML; MG/3ML
3 SOLUTION RESPIRATORY (INHALATION) EVERY 6 HOURS
Refills: 0 | Status: DISCONTINUED | OUTPATIENT
Start: 2024-10-23 | End: 2024-11-07

## 2024-10-23 RX ORDER — HEPARIN SODIUM 10000 [USP'U]/ML
5000 INJECTION INTRAVENOUS; SUBCUTANEOUS EVERY 12 HOURS
Refills: 0 | Status: DISCONTINUED | OUTPATIENT
Start: 2024-10-23 | End: 2024-11-07

## 2024-10-23 RX ORDER — INSULIN LISPRO 100/ML
VIAL (ML) SUBCUTANEOUS EVERY 6 HOURS
Refills: 0 | Status: DISCONTINUED | OUTPATIENT
Start: 2024-10-23 | End: 2024-11-07

## 2024-10-23 RX ORDER — SODIUM CHLORIDE 9 MG/ML
4 INJECTION, SOLUTION INTRAMUSCULAR; INTRAVENOUS; SUBCUTANEOUS EVERY 6 HOURS
Refills: 0 | Status: DISCONTINUED | OUTPATIENT
Start: 2024-10-23 | End: 2024-11-07

## 2024-10-23 RX ORDER — CHLORHEXIDINE GLUCONATE 40 MG/ML
1 SOLUTION TOPICAL
Refills: 0 | Status: DISCONTINUED | OUTPATIENT
Start: 2024-10-24 | End: 2024-11-07

## 2024-10-23 RX ORDER — B-COMPLEX WITH VITAMIN C
1 VIAL (ML) INJECTION DAILY
Refills: 0 | Status: DISCONTINUED | OUTPATIENT
Start: 2024-10-23 | End: 2024-11-07

## 2024-10-23 RX ORDER — FERROUS SULFATE 325(65) MG
300 TABLET ORAL DAILY
Refills: 0 | Status: DISCONTINUED | OUTPATIENT
Start: 2024-10-23 | End: 2024-11-07

## 2024-10-23 RX ADMIN — POLYETHYLENE GLYCOL 3350 17 GRAM(S): 17 POWDER, FOR SOLUTION ORAL at 12:30

## 2024-10-23 RX ADMIN — IPRATROPIUM BROMIDE AND ALBUTEROL SULFATE 3 MILLILITER(S): .5; 2.5 SOLUTION RESPIRATORY (INHALATION) at 17:11

## 2024-10-23 RX ADMIN — HYDRALAZINE HYDROCHLORIDE 10 MILLIGRAM(S): 50 TABLET, FILM COATED ORAL at 00:02

## 2024-10-23 RX ADMIN — CHLORHEXIDINE GLUCONATE 1 APPLICATION(S): 40 SOLUTION TOPICAL at 05:21

## 2024-10-23 RX ADMIN — Medication 6: at 23:01

## 2024-10-23 RX ADMIN — Medication 300 MILLIGRAM(S): at 12:30

## 2024-10-23 RX ADMIN — CHLORHEXIDINE GLUCONATE 15 MILLILITER(S): 40 SOLUTION TOPICAL at 05:21

## 2024-10-23 RX ADMIN — Medication 100 MILLIEQUIVALENT(S): at 06:32

## 2024-10-23 RX ADMIN — CHLORHEXIDINE GLUCONATE 15 MILLILITER(S): 40 SOLUTION TOPICAL at 17:16

## 2024-10-23 RX ADMIN — SODIUM CHLORIDE 4 MILLILITER(S): 9 INJECTION, SOLUTION INTRAMUSCULAR; INTRAVENOUS; SUBCUTANEOUS at 11:48

## 2024-10-23 RX ADMIN — Medication 2: at 05:20

## 2024-10-23 RX ADMIN — SODIUM CHLORIDE 4 MILLILITER(S): 9 INJECTION, SOLUTION INTRAMUSCULAR; INTRAVENOUS; SUBCUTANEOUS at 06:07

## 2024-10-23 RX ADMIN — SODIUM CHLORIDE 4 MILLILITER(S): 9 INJECTION, SOLUTION INTRAMUSCULAR; INTRAVENOUS; SUBCUTANEOUS at 17:12

## 2024-10-23 RX ADMIN — IPRATROPIUM BROMIDE AND ALBUTEROL SULFATE 3 MILLILITER(S): .5; 2.5 SOLUTION RESPIRATORY (INHALATION) at 11:48

## 2024-10-23 RX ADMIN — PANTOPRAZOLE SODIUM 40 MILLIGRAM(S): 40 TABLET, DELAYED RELEASE ORAL at 12:30

## 2024-10-23 RX ADMIN — Medication 4: at 17:16

## 2024-10-23 RX ADMIN — Medication 2 TABLET(S): at 21:12

## 2024-10-23 RX ADMIN — IPRATROPIUM BROMIDE AND ALBUTEROL SULFATE 3 MILLILITER(S): .5; 2.5 SOLUTION RESPIRATORY (INHALATION) at 01:06

## 2024-10-23 RX ADMIN — SODIUM CHLORIDE 4 MILLILITER(S): 9 INJECTION, SOLUTION INTRAMUSCULAR; INTRAVENOUS; SUBCUTANEOUS at 01:06

## 2024-10-23 RX ADMIN — Medication 4: at 00:02

## 2024-10-23 RX ADMIN — HEPARIN SODIUM 5000 UNIT(S): 10000 INJECTION INTRAVENOUS; SUBCUTANEOUS at 17:16

## 2024-10-23 RX ADMIN — Medication 0.5 MILLIGRAM(S): at 17:16

## 2024-10-23 RX ADMIN — Medication 1 TABLET(S): at 12:30

## 2024-10-23 RX ADMIN — Medication 0.5 MILLIGRAM(S): at 17:30

## 2024-10-23 RX ADMIN — IPRATROPIUM BROMIDE AND ALBUTEROL SULFATE 3 MILLILITER(S): .5; 2.5 SOLUTION RESPIRATORY (INHALATION) at 06:07

## 2024-10-23 NOTE — PROGRESS NOTE ADULT - SUBJECTIVE AND OBJECTIVE BOX
INTERVAL HPI/OVERNIGHT EVENTS:   HPI:  Megha Art is a 73 year old male with PMHx of HTN, IDDM2, ESRD on HD (M/W/F, through RUE AV fistula), hx of CVA x 2 (with residual R. sided weakness and dysphagia s/p PEG tube, previously with tracheostomy and now removed), and hx of RLE DVT (completed apixaban course) who presented to the ED on 10/4/24 for complaints of cough and chills.    History obtained from wife at bedside. As per wife, patient typically coughs at baseline since he previously had a tracheostomy. However, for the past 2-3 days, he has been coughing more frequently and it has been productive with yellow phlegm. Wife was assisting him with getting dressed this afternoon around 1 PM when he felt nauseous and started complaining of abdominal pain. Then had a bowel movement. When wife was cleaning him up, patient was shaking "like he had chills" and she thinks his eyes rolled back for a few seconds. No loss of consciousness. EMS was called and another episode of shaking upon EMS arrival. Of note, patient is due for dialysis today but did not receive it since he came to the ER today. Baseline functional status is wheelchair bound and dependent with all ADLs. NPO with Nepro q4. Lives at home with wife.     In the ED, Tmax 101.3, HR as elevated as 118, BP as elevated as 201/68, and SpO2 as low as 91% on room air. Initially placed on 15L NRB with improvement of SpO2 to 95%. Now on room air. WBC 17.38K, hgb 9.9, sodium 129, BUN 61, Cr 4.05, alkphos 169. Lactic acid 3.6. CT head without evidence of acute hemorrhage mass or mass effect but with encephalomalacia and gliosis again seen involving the bifrontal region. CT CAP with pneumonia and rectum distended with stool. Received  cc bolus, vancomycin 1 g IV, zosyn 3.375 g IV, acetaminophen 1 g IV, hydralazine 10 mg IV, pantoprazole 40 mg IV, and ondansetron 4 mg IV. Evaluated by nephrology who is planning for HD tomorrow. (04 Oct 2024 22:03)      CENTRAL LINE: [ ] YES [ ] NO  LOCATION:   DATE INSERTED:  REMOVE: [ ] YES [ ] NO  EXPLAIN:    MCGILL: [ ] YES [ ] NO    DATE INSERTED:  REMOVE:  [ ] YES [ ] NO  EXPLAIN:    A-LINE:  [ ] YES [ ] NO  LOCATION:   DATE INSERTED:  REMOVE:  [ ] YES [ ] NO  EXPLAIN:    PAST MEDICAL & SURGICAL HISTORY:  ESRD on hemodialysis      HTN (hypertension)      Insulin dependent type 2 diabetes mellitus      History of cerebrovascular accident (CVA) with residual deficit      History of DVT of lower extremity      Arteriovenous fistula      S/P percutaneous endoscopic gastrostomy (PEG) tube placement      History of tracheostomy          REVIEW OF SYSTEMS:    Negative ROS aside from HPI/Interval events above.    ICU Vital Signs Last 24 Hrs  T(C): 36.1 (23 Oct 2024 10:07), Max: 37.7 (22 Oct 2024 14:00)  T(F): 97 (23 Oct 2024 10:07), Max: 99.9 (22 Oct 2024 14:00)  HR: 84 (23 Oct 2024 13:00) (77 - 104)  BP: 127/59 (23 Oct 2024 13:00) (108/60 - 178/75)  BP(mean): 78 (23 Oct 2024 13:00) (73 - 103)  ABP: --  ABP(mean): --  RR: 20 (23 Oct 2024 13:00) (14 - 28)  SpO2: 100% (23 Oct 2024 12:37) (84% - 100%)    O2 Parameters below as of 23 Oct 2024 07:00  Patient On (Oxygen Delivery Method): ventilator                I&O's Detail    22 Oct 2024 07:01  -  23 Oct 2024 07:00  --------------------------------------------------------  IN:    IV PiggyBack: 100 mL    Nepro with Carb Steady: 225 mL    Other (mL): 800 mL  Total IN: 1125 mL    OUT:    Other (mL): 2300 mL    Rectal Tube (mL): 50 mL  Total OUT: 2350 mL    Total NET: -1225 mL          Mode: AC/ CMV (Assist Control/ Continuous Mandatory Ventilation)  RR (machine): 16  TV (machine): 400  FiO2: 35  PEEP: 5  ITime: 0.9  MAP: 11  PIP: 30    CAPILLARY BLOOD GLUCOSE      POCT Blood Glucose.: 202 mg/dL (23 Oct 2024 12:24)  POCT Blood Glucose.: 197 mg/dL (23 Oct 2024 05:14)  POCT Blood Glucose.: 226 mg/dL (22 Oct 2024 23:49)  POCT Blood Glucose.: 160 mg/dL (22 Oct 2024 18:10)        PHYSICAL EXAM:    Gen: lying comfortably in bed in no apparent distress  Mouth: (+) Trach C/D/I  Lungs: B/l rhonchi  Heart: RRR  Abd: Soft/+BS/ Non-tender  Ext: Atrophy LE  Neuro: sedated      LABS:                        7.9    19.58 )-----------( 247      ( 23 Oct 2024 02:40 )             23.9      10-23    137  |  99  |  30[H]  ----------------------------<  176[H]  3.4[L]   |  25  |  2.99[H]    Ca    9.6      23 Oct 2024 02:40  Phos  2.5     10-23  Mg     2.3     10-23    TPro  7.3  /  Alb  1.8[L]  /  TBili  0.5  /  DBili  x   /  AST  395[H]  /  ALT  660[H]  /  AlkPhos  166[H]  10-22    PT/INR - ( 23 Oct 2024 02:40 )   PT: 14.7 sec;   INR: 1.30 ratio         PTT - ( 23 Oct 2024 02:40 )  PTT:28.8 sec  Urinalysis Basic - ( 23 Oct 2024 02:40 )    Color: x / Appearance: x / SG: x / pH: x  Gluc: 176 mg/dL / Ketone: x  / Bili: x / Urobili: x   Blood: x / Protein: x / Nitrite: x   Leuk Esterase: x / RBC: x / WBC x   Sq Epi: x / Non Sq Epi: x / Bacteria: x

## 2024-10-23 NOTE — PROGRESS NOTE ADULT - ASSESSMENT
ESRD:  On HD TIW, last on Saturday.  - HD TIW.   - Will discuss prognosis with his wife.  - Limit UF due to Hypotension.  - Midodrine as needed.      Anemia:  - Transfuse for Hg < 7.  - Continue EPO.    Respiratory Failure:  Failed extubation.  - Tracheostomy done.    Otf Mena MD  Nephrology

## 2024-10-23 NOTE — BRIEF OPERATIVE NOTE - NSICDXBRIEFPROCEDURE_GEN_ALL_CORE_FT
PROCEDURES:  Open tracheostomy 23-Oct-2024 12:11:18  Bowen Wheatley  Thyroid isthmus division 23-Oct-2024 12:11:26  Bowen Wheatley

## 2024-10-23 NOTE — BRIEF OPERATIVE NOTE - NSICDXBRIEFPREOP_GEN_ALL_CORE_FT
PRE-OP DIAGNOSIS:  Acute on chronic respiratory failure with hypoxia 23-Oct-2024 12:11:51  Bowen Wheatley

## 2024-10-23 NOTE — PROGRESS NOTE ADULT - SUBJECTIVE AND OBJECTIVE BOX
BALDO JIMMY  73y  Patient is a 73y old  Male who presents with a chief complaint of Sepsis and acute hypoxic respiratory failure secondary to suspected aspiration PNA vs. HCAP (23 Oct 2024 07:48)    HPI:  Seen and examined.  S/P Tracheostomy.    HEALTH ISSUES - PROBLEM Dx:  Sepsis due to pneumonia    Acute respiratory failure with hypoxia    Hypertensive urgency    Constipation    Lactic acidosis    Insulin dependent type 2 diabetes mellitus    ESRD on hemodialysis    History of cerebrovascular accident (CVA) with residual deficit    History of DVT of lower extremity    Microcytic anemia    Hypoxia    Severe protein-calorie malnutrition    Encounter for palliative care          MEDICATIONS  (STANDING):  albuterol/ipratropium for Nebulization 3 milliLiter(s) Nebulizer every 6 hours  chlorhexidine 0.12% Liquid 15 milliLiter(s) Oral Mucosa every 12 hours  chlorhexidine 2% Cloths 1 Application(s) Topical <User Schedule>  ferrous    sulfate Liquid 300 milliGRAM(s) Enteral Tube daily  heparin   Injectable 5000 Unit(s) SubCutaneous every 12 hours  insulin lispro (ADMELOG) corrective regimen sliding scale   SubCutaneous every 6 hours  Nephro-noam 1 Tablet(s) Oral daily  pantoprazole   Suspension 40 milliGRAM(s) Enteral Tube daily  polyethylene glycol 3350 17 Gram(s) Oral daily  senna 2 Tablet(s) Oral at bedtime  sodium chloride 3%  Inhalation 4 milliLiter(s) Inhalation every 6 hours    MEDICATIONS  (PRN):  HYDROmorphone  Injectable 0.5 milliGRAM(s) IV Push every 4 hours PRN Severe Pain (7 - 10)    Vital Signs Last 24 Hrs  T(C): 36.1 (23 Oct 2024 10:07), Max: 37.7 (22 Oct 2024 14:00)  T(F): 97 (23 Oct 2024 10:07), Max: 99.9 (22 Oct 2024 14:00)  HR: 85 (23 Oct 2024 12:37) (77 - 104)  BP: 146/62 (23 Oct 2024 12:00) (108/60 - 178/75)  BP(mean): 86 (23 Oct 2024 12:00) (73 - 103)  RR: 19 (23 Oct 2024 12:00) (14 - 28)  SpO2: 100% (23 Oct 2024 12:37) (84% - 100%)    Parameters below as of 23 Oct 2024 07:00  Patient On (Oxygen Delivery Method): ventilator      Daily Height in cm: 172.7 (23 Oct 2024 07:29)    Daily Weight in k.2 (22 Oct 2024 18:20)    PHYSICAL EXAM:  Constitutional: He appears comfortable and not distressed. Not diaphoretic.    Neck:  The thyroid is normal. Trachea is midline.     Respiratory: The lungs are clear to auscultation. No dullness and expansion is normal.    Cardiovascular: S1 and S2 are normal. No murmurs, rubs or gallops are present.    Gastrointestinal: The abdomen is soft. No tenderness is present. No masses are present. Bowel sounds are normal.    Genitourinary: The bladder is not distended. No CVA tenderness is present.    Extremities: No edema is noted. No deformities are present.    Neurological: Cognition is normal.     Skin: No lesions are seen  or palpated.    Lymph Nodes: No lymphadenopathy is present.                                7.9    19.58 )-----------( 247      ( 23 Oct 2024 02:40 )             23.9     10    137  |  99  |  30[H]  ----------------------------<  176[H]  3.4[L]   |  25  |  2.99[H]    Ca    9.6      23 Oct 2024 02:40  Phos  2.5     10-23  Mg     2.3     10-23    TPro  7.3  /  Alb  1.8[L]  /  TBili  0.5  /  DBili  x   /  AST  395[H]  /  ALT  660[H]  /  AlkPhos  166[H]  10-22

## 2024-10-23 NOTE — PROGRESS NOTE ADULT - ASSESSMENT
74 yo M with h/o HTN, DM2, ESRD on HD (MWF, RUE AV Fistula), CVA with residual R sided weakness and dysphagia s/p peg, s/p trach since decannulated, RLE DVT (completed course of Eliquis) admitted with 1) RLL PNA with course complicated by acute hypoxic respiratory failure ultimately requiring intubation, failed trial of extubation and was subsequently reintubated 10/14 2) Septic shock 2 to PNA    Resp/GI/Social: Pt is full code. s/p Trach this morning. post-trach CXR.  ID: completed abx course. observe off abx.  CVS: Stable off vasopressors.   HEME: DVT prophylaxis with sq Heparin  FEN: Severe protein-calorie malnutrition and Underweight (BMI < 19); cont enteral feeds  Endo: Follow FS and cover with Lispro according to set parameters  Renal: HD as per Renal as needed.  Neuro: analgesia for pain.

## 2024-10-23 NOTE — PROGRESS NOTE ADULT - SUBJECTIVE AND OBJECTIVE BOX
Patient seen and examined bedside alert awake, on vent.     T(F): 97 (10-23-24 @ 10:07), Max: 99.9 (10-23-24 @ 00:00)  HR: 85 (10-23-24 @ 14:00) (77 - 104)  BP: 139/60 (10-23-24 @ 14:00) (108/60 - 178/75)  RR: 15 (10-23-24 @ 14:00) (14 - 28)  SpO2: 97% (10-23-24 @ 14:00) (84% - 100%)  Wt(kg): --  CAPILLARY BLOOD GLUCOSE      POCT Blood Glucose.: 202 mg/dL (23 Oct 2024 12:24)  POCT Blood Glucose.: 197 mg/dL (23 Oct 2024 05:14)  POCT Blood Glucose.: 226 mg/dL (22 Oct 2024 23:49)  POCT Blood Glucose.: 160 mg/dL (22 Oct 2024 18:10)      PHYSICAL EXAM:  General: NAD, on vent  neck: trache in place with surgicel no active discharge or bleeding   Lung:breathing synchronously with vent    LABS:                        7.9    19.58 )-----------( 247      ( 23 Oct 2024 02:40 )             23.9     10-23    137  |  99  |  30[H]  ----------------------------<  176[H]  3.4[L]   |  25  |  2.99[H]    Ca    9.6      23 Oct 2024 02:40  Phos  2.5     10-23  Mg     2.3     10-23    TPro  7.3  /  Alb  1.8[L]  /  TBili  0.5  /  DBili  x   /  AST  395[H]  /  ALT  660[H]  /  AlkPhos  166[H]  10-22    PT/INR - ( 23 Oct 2024 02:40 )   PT: 14.7 sec;   INR: 1.30 ratio         PTT - ( 23 Oct 2024 02:40 )  PTT:28.8 sec    I&O:     Culture Results:   No growth at 5 days (10-18 @ 11:10)    A/P: 74 yo M with respiratory failure now POD0 s/p tracheostomy   -surgicel to be removed on 10/25 by surgical team  -c/w vent and medical management per ICU

## 2024-10-23 NOTE — BRIEF OPERATIVE NOTE - NSICDXBRIEFPOSTOP_GEN_ALL_CORE_FT
POST-OP DIAGNOSIS:  Acute on chronic respiratory failure with hypoxia 23-Oct-2024 12:12:05  Bowen Wheatley

## 2024-10-24 LAB
ALBUMIN SERPL ELPH-MCNC: 1.8 G/DL — LOW (ref 3.3–5)
ALP SERPL-CCNC: 128 U/L — HIGH (ref 40–120)
ALT FLD-CCNC: 170 U/L — HIGH (ref 12–78)
ANION GAP SERPL CALC-SCNC: 16 MMOL/L — SIGNIFICANT CHANGE UP (ref 5–17)
AST SERPL-CCNC: 72 U/L — HIGH (ref 15–37)
BASOPHILS # BLD AUTO: 0.02 K/UL — SIGNIFICANT CHANGE UP (ref 0–0.2)
BASOPHILS NFR BLD AUTO: 0.1 % — SIGNIFICANT CHANGE UP (ref 0–2)
BILIRUB SERPL-MCNC: 0.4 MG/DL — SIGNIFICANT CHANGE UP (ref 0.2–1.2)
BUN SERPL-MCNC: 41 MG/DL — HIGH (ref 7–23)
CALCIUM SERPL-MCNC: 8.7 MG/DL — SIGNIFICANT CHANGE UP (ref 8.5–10.1)
CHLORIDE SERPL-SCNC: 100 MMOL/L — SIGNIFICANT CHANGE UP (ref 96–108)
CO2 SERPL-SCNC: 21 MMOL/L — LOW (ref 22–31)
CREAT SERPL-MCNC: 4.22 MG/DL — HIGH (ref 0.5–1.3)
EGFR: 14 ML/MIN/1.73M2 — LOW
EOSINOPHIL # BLD AUTO: 0.03 K/UL — SIGNIFICANT CHANGE UP (ref 0–0.5)
EOSINOPHIL NFR BLD AUTO: 0.2 % — SIGNIFICANT CHANGE UP (ref 0–6)
GLUCOSE BLDC GLUCOMTR-MCNC: 169 MG/DL — HIGH (ref 70–99)
GLUCOSE BLDC GLUCOMTR-MCNC: 292 MG/DL — HIGH (ref 70–99)
GLUCOSE BLDC GLUCOMTR-MCNC: 303 MG/DL — HIGH (ref 70–99)
GLUCOSE BLDC GLUCOMTR-MCNC: 306 MG/DL — HIGH (ref 70–99)
GLUCOSE SERPL-MCNC: 228 MG/DL — HIGH (ref 70–99)
HCT VFR BLD CALC: 22.2 % — LOW (ref 39–50)
HGB BLD-MCNC: 7.2 G/DL — LOW (ref 13–17)
IMM GRANULOCYTES NFR BLD AUTO: 0.4 % — SIGNIFICANT CHANGE UP (ref 0–0.9)
LYMPHOCYTES # BLD AUTO: 0.61 K/UL — LOW (ref 1–3.3)
LYMPHOCYTES # BLD AUTO: 3.9 % — LOW (ref 13–44)
MAGNESIUM SERPL-MCNC: 2.2 MG/DL — SIGNIFICANT CHANGE UP (ref 1.6–2.6)
MCHC RBC-ENTMCNC: 23.8 PG — LOW (ref 27–34)
MCHC RBC-ENTMCNC: 32.4 G/DL — SIGNIFICANT CHANGE UP (ref 32–36)
MCV RBC AUTO: 73.5 FL — LOW (ref 80–100)
MONOCYTES # BLD AUTO: 0.55 K/UL — SIGNIFICANT CHANGE UP (ref 0–0.9)
MONOCYTES NFR BLD AUTO: 3.5 % — SIGNIFICANT CHANGE UP (ref 2–14)
NEUTROPHILS # BLD AUTO: 14.41 K/UL — HIGH (ref 1.8–7.4)
NEUTROPHILS NFR BLD AUTO: 91.9 % — HIGH (ref 43–77)
NRBC # BLD: 0 /100 WBCS — SIGNIFICANT CHANGE UP (ref 0–0)
PHOSPHATE SERPL-MCNC: 3.1 MG/DL — SIGNIFICANT CHANGE UP (ref 2.5–4.5)
PLATELET # BLD AUTO: 196 K/UL — SIGNIFICANT CHANGE UP (ref 150–400)
POTASSIUM SERPL-MCNC: 3.4 MMOL/L — LOW (ref 3.5–5.3)
POTASSIUM SERPL-SCNC: 3.4 MMOL/L — LOW (ref 3.5–5.3)
PROT SERPL-MCNC: 7.1 GM/DL — SIGNIFICANT CHANGE UP (ref 6–8.3)
RBC # BLD: 3.02 M/UL — LOW (ref 4.2–5.8)
RBC # FLD: 24.3 % — HIGH (ref 10.3–14.5)
SODIUM SERPL-SCNC: 137 MMOL/L — SIGNIFICANT CHANGE UP (ref 135–145)
WBC # BLD: 15.68 K/UL — HIGH (ref 3.8–10.5)
WBC # FLD AUTO: 15.68 K/UL — HIGH (ref 3.8–10.5)

## 2024-10-24 PROCEDURE — 99291 CRITICAL CARE FIRST HOUR: CPT

## 2024-10-24 RX ORDER — ACETAMINOPHEN 500 MG
650 TABLET ORAL EVERY 6 HOURS
Refills: 0 | Status: DISCONTINUED | OUTPATIENT
Start: 2024-10-24 | End: 2024-11-06

## 2024-10-24 RX ORDER — POTASSIUM CHLORIDE 10 MEQ
20 TABLET, EXTENDED RELEASE ORAL ONCE
Refills: 0 | Status: COMPLETED | OUTPATIENT
Start: 2024-10-24 | End: 2024-10-24

## 2024-10-24 RX ADMIN — ERYTHROPOIETIN 10000 UNIT(S): 10000 INJECTION, SOLUTION INTRAVENOUS; SUBCUTANEOUS at 16:01

## 2024-10-24 RX ADMIN — HEPARIN SODIUM 5000 UNIT(S): 10000 INJECTION INTRAVENOUS; SUBCUTANEOUS at 18:10

## 2024-10-24 RX ADMIN — Medication 2 TABLET(S): at 23:09

## 2024-10-24 RX ADMIN — SODIUM CHLORIDE 4 MILLILITER(S): 9 INJECTION, SOLUTION INTRAMUSCULAR; INTRAVENOUS; SUBCUTANEOUS at 05:23

## 2024-10-24 RX ADMIN — Medication 1 TABLET(S): at 11:46

## 2024-10-24 RX ADMIN — Medication 20 MILLIEQUIVALENT(S): at 05:35

## 2024-10-24 RX ADMIN — Medication 2: at 18:10

## 2024-10-24 RX ADMIN — IPRATROPIUM BROMIDE AND ALBUTEROL SULFATE 3 MILLILITER(S): .5; 2.5 SOLUTION RESPIRATORY (INHALATION) at 05:23

## 2024-10-24 RX ADMIN — PANTOPRAZOLE SODIUM 40 MILLIGRAM(S): 40 TABLET, DELAYED RELEASE ORAL at 11:45

## 2024-10-24 RX ADMIN — IPRATROPIUM BROMIDE AND ALBUTEROL SULFATE 3 MILLILITER(S): .5; 2.5 SOLUTION RESPIRATORY (INHALATION) at 11:19

## 2024-10-24 RX ADMIN — IPRATROPIUM BROMIDE AND ALBUTEROL SULFATE 3 MILLILITER(S): .5; 2.5 SOLUTION RESPIRATORY (INHALATION) at 18:04

## 2024-10-24 RX ADMIN — IPRATROPIUM BROMIDE AND ALBUTEROL SULFATE 3 MILLILITER(S): .5; 2.5 SOLUTION RESPIRATORY (INHALATION) at 00:27

## 2024-10-24 RX ADMIN — Medication 300 MILLIGRAM(S): at 11:46

## 2024-10-24 RX ADMIN — Medication 8: at 11:46

## 2024-10-24 RX ADMIN — CHLORHEXIDINE GLUCONATE 1 APPLICATION(S): 40 SOLUTION TOPICAL at 05:36

## 2024-10-24 RX ADMIN — Medication 8: at 23:46

## 2024-10-24 RX ADMIN — Medication 6: at 05:37

## 2024-10-24 RX ADMIN — HEPARIN SODIUM 5000 UNIT(S): 10000 INJECTION INTRAVENOUS; SUBCUTANEOUS at 05:35

## 2024-10-24 RX ADMIN — Medication 650 MILLIGRAM(S): at 05:36

## 2024-10-24 RX ADMIN — SODIUM CHLORIDE 4 MILLILITER(S): 9 INJECTION, SOLUTION INTRAMUSCULAR; INTRAVENOUS; SUBCUTANEOUS at 11:19

## 2024-10-24 RX ADMIN — CHLORHEXIDINE GLUCONATE 15 MILLILITER(S): 40 SOLUTION TOPICAL at 05:36

## 2024-10-24 RX ADMIN — Medication 650 MILLIGRAM(S): at 06:36

## 2024-10-24 RX ADMIN — SODIUM CHLORIDE 4 MILLILITER(S): 9 INJECTION, SOLUTION INTRAMUSCULAR; INTRAVENOUS; SUBCUTANEOUS at 00:26

## 2024-10-24 RX ADMIN — SODIUM CHLORIDE 4 MILLILITER(S): 9 INJECTION, SOLUTION INTRAMUSCULAR; INTRAVENOUS; SUBCUTANEOUS at 18:04

## 2024-10-24 RX ADMIN — CHLORHEXIDINE GLUCONATE 15 MILLILITER(S): 40 SOLUTION TOPICAL at 18:10

## 2024-10-24 NOTE — PROGRESS NOTE ADULT - SUBJECTIVE AND OBJECTIVE BOX
INTERVAL HPI/OVERNIGHT EVENTS:   HPI:  Megha Art is a 73 year old male with PMHx of HTN, IDDM2, ESRD on HD (M/W/F, through RUE AV fistula), hx of CVA x 2 (with residual R. sided weakness and dysphagia s/p PEG tube, previously with tracheostomy and now removed), and hx of RLE DVT (completed apixaban course) who presented to the ED on 10/4/24 for complaints of cough and chills.    History obtained from wife at bedside. As per wife, patient typically coughs at baseline since he previously had a tracheostomy. However, for the past 2-3 days, he has been coughing more frequently and it has been productive with yellow phlegm. Wife was assisting him with getting dressed this afternoon around 1 PM when he felt nauseous and started complaining of abdominal pain. Then had a bowel movement. When wife was cleaning him up, patient was shaking "like he had chills" and she thinks his eyes rolled back for a few seconds. No loss of consciousness. EMS was called and another episode of shaking upon EMS arrival. Of note, patient is due for dialysis today but did not receive it since he came to the ER today. Baseline functional status is wheelchair bound and dependent with all ADLs. NPO with Nepro q4. Lives at home with wife.     In the ED, Tmax 101.3, HR as elevated as 118, BP as elevated as 201/68, and SpO2 as low as 91% on room air. Initially placed on 15L NRB with improvement of SpO2 to 95%. Now on room air. WBC 17.38K, hgb 9.9, sodium 129, BUN 61, Cr 4.05, alkphos 169. Lactic acid 3.6. CT head without evidence of acute hemorrhage mass or mass effect but with encephalomalacia and gliosis again seen involving the bifrontal region. CT CAP with pneumonia and rectum distended with stool. Received  cc bolus, vancomycin 1 g IV, zosyn 3.375 g IV, acetaminophen 1 g IV, hydralazine 10 mg IV, pantoprazole 40 mg IV, and ondansetron 4 mg IV. Evaluated by nephrology who is planning for HD tomorrow. (04 Oct 2024 22:03)      CENTRAL LINE: [ ] YES [ ] NO  LOCATION:   DATE INSERTED:  REMOVE: [ ] YES [ ] NO  EXPLAIN:    MCGILL: [ ] YES [ ] NO    DATE INSERTED:  REMOVE:  [ ] YES [ ] NO  EXPLAIN:    A-LINE:  [ ] YES [ ] NO  LOCATION:   DATE INSERTED:  REMOVE:  [ ] YES [ ] NO  EXPLAIN:    PAST MEDICAL & SURGICAL HISTORY:  ESRD on hemodialysis      HTN (hypertension)      Insulin dependent type 2 diabetes mellitus      History of cerebrovascular accident (CVA) with residual deficit      History of DVT of lower extremity      Arteriovenous fistula      S/P percutaneous endoscopic gastrostomy (PEG) tube placement      History of tracheostomy          REVIEW OF SYSTEMS:    Negative ROS aside from HPI/Interval events above.    ICU Vital Signs Last 24 Hrs  T(C): 38.6 (24 Oct 2024 05:00), Max: 38.6 (24 Oct 2024 05:00)  T(F): 101.5 (24 Oct 2024 05:00), Max: 101.5 (24 Oct 2024 05:00)  HR: 87 (24 Oct 2024 11:00) (77 - 98)  BP: 153/64 (24 Oct 2024 11:00) (102/52 - 172/65)  BP(mean): 89 (24 Oct 2024 11:00) (68 - 89)  ABP: --  ABP(mean): --  RR: 26 (24 Oct 2024 11:00) (15 - 26)  SpO2: 97% (24 Oct 2024 11:00) (93% - 100%)    O2 Parameters below as of 24 Oct 2024 08:15  Patient On (Oxygen Delivery Method): ventilator                I&O's Detail    23 Oct 2024 07:01  -  24 Oct 2024 07:00  --------------------------------------------------------  IN:    Enteral Tube Flush: 100 mL    Nepro with Carb Steady: 370 mL  Total IN: 470 mL    OUT:    Rectal Tube (mL): 400 mL  Total OUT: 400 mL    Total NET: 70 mL      24 Oct 2024 07:01  -  24 Oct 2024 11:31  --------------------------------------------------------  IN:    Nepro with Carb Steady: 70 mL  Total IN: 70 mL    OUT:  Total OUT: 0 mL    Total NET: 70 mL          Mode: CPAP with PS  FiO2: 35  PEEP: 5  PS: 8  ITime: 1  MAP: 8  PIP: 15    CAPILLARY BLOOD GLUCOSE      POCT Blood Glucose.: 292 mg/dL (24 Oct 2024 05:34)  POCT Blood Glucose.: 255 mg/dL (23 Oct 2024 22:56)  POCT Blood Glucose.: 230 mg/dL (23 Oct 2024 17:07)  POCT Blood Glucose.: 202 mg/dL (23 Oct 2024 12:24)        PHYSICAL EXAM:    Gen: lying comfortably in bed in no apparent distress  Mouth: (+) Trach C/D/I  Lungs: B/l rhonchi  Heart: RRR  Abd: Soft/+BS/ Non-tender  Ext: Atrophy LE  Neuro: off sedation.      LABS:                        7.2    15.68 )-----------( 196      ( 24 Oct 2024 02:05 )             22.2      10-24    137  |  100  |  41[H]  ----------------------------<  228[H]  3.4[L]   |  21[L]  |  4.22[H]    Ca    8.7      24 Oct 2024 02:10  Phos  3.1     10-24  Mg     2.2     10-24    TPro  7.1  /  Alb  1.8[L]  /  TBili  0.4  /  DBili  x   /  AST  72[H]  /  ALT  170[H]  /  AlkPhos  128[H]  10-24    PT/INR - ( 23 Oct 2024 02:40 )   PT: 14.7 sec;   INR: 1.30 ratio         PTT - ( 23 Oct 2024 02:40 )  PTT:28.8 sec  Urinalysis Basic - ( 24 Oct 2024 02:10 )    Color: x / Appearance: x / SG: x / pH: x  Gluc: 228 mg/dL / Ketone: x  / Bili: x / Urobili: x   Blood: x / Protein: x / Nitrite: x   Leuk Esterase: x / RBC: x / WBC x   Sq Epi: x / Non Sq Epi: x / Bacteria: x

## 2024-10-24 NOTE — PROGRESS NOTE ADULT - SUBJECTIVE AND OBJECTIVE BOX
JIMMY BLAND  73y  Patient is a 73y old  Male who presents with a chief complaint of Sepsis and acute hypoxic respiratory failure secondary to suspected aspiration PNA vs. HCAP (24 Oct 2024 11:30)    HPI:  ESRD, followed for HD Services.    HEALTH ISSUES - PROBLEM Dx:  Sepsis due to pneumonia    Acute respiratory failure with hypoxia    Hypertensive urgency    Constipation    Lactic acidosis    Insulin dependent type 2 diabetes mellitus    ESRD on hemodialysis    History of cerebrovascular accident (CVA) with residual deficit    History of DVT of lower extremity    Microcytic anemia    Hypoxia    Severe protein-calorie malnutrition    Encounter for palliative care          MEDICATIONS  (STANDING):  albuterol/ipratropium for Nebulization 3 milliLiter(s) Nebulizer every 6 hours  chlorhexidine 0.12% Liquid 15 milliLiter(s) Oral Mucosa every 12 hours  chlorhexidine 2% Cloths 1 Application(s) Topical <User Schedule>  epoetin reilly-epbx (RETACRIT) Injectable 81735 Unit(s) IV Push <User Schedule>  ferrous    sulfate Liquid 300 milliGRAM(s) Enteral Tube daily  heparin   Injectable 5000 Unit(s) SubCutaneous every 12 hours  insulin lispro (ADMELOG) corrective regimen sliding scale   SubCutaneous every 6 hours  Nephro-noam 1 Tablet(s) Oral daily  pantoprazole   Suspension 40 milliGRAM(s) Enteral Tube daily  polyethylene glycol 3350 17 Gram(s) Oral daily  senna 2 Tablet(s) Oral at bedtime  sodium chloride 3%  Inhalation 4 milliLiter(s) Inhalation every 6 hours    MEDICATIONS  (PRN):  acetaminophen   Oral Liquid .. 650 milliGRAM(s) Oral every 6 hours PRN Temp greater or equal to 38.5C (101.3F)  HYDROmorphone  Injectable 0.5 milliGRAM(s) IV Push every 4 hours PRN Severe Pain (7 - 10)    Vital Signs Last 24 Hrs  T(C): 37.8 (24 Oct 2024 23:56), Max: 38.6 (24 Oct 2024 05:00)  T(F): 100.1 (24 Oct 2024 23:56), Max: 101.5 (24 Oct 2024 05:00)  HR: 99 (24 Oct 2024 23:00) (86 - 103)  BP: 117/58 (24 Oct 2024 23:00) (113/66 - 172/65)  BP(mean): 75 (24 Oct 2024 23:00) (72 - 89)  RR: 22 (24 Oct 2024 23:00) (16 - 26)  SpO2: 93% (24 Oct 2024 23:00) (93% - 100%)    Parameters below as of 24 Oct 2024 19:10  Patient On (Oxygen Delivery Method): ventilator, tv 400,peep 5,ac 16    O2 Concentration (%): 35  Daily     Daily Weight in k (24 Oct 2024 17:25)    PHYSICAL EXAM:  Constitutional: He appears comfortable and not distressed. Not diaphoretic.    Neck:  The thyroid is normal. Tracheostomy.    Breasts: Normal examination.    Respiratory: The lungs are clear to auscultation. No dullness and expansion is normal.    Cardiovascular: S1 and S2 are normal. No mummurs, rubs or gallops are present.    Gastrointestinal: The abdomen is soft. No tenderness is present. No masses are present. Bowel sounds are normal.    Genitourinary: The bladder is not distended. No CVA tenderness is present.    Extremities: No edema is noted. No deformities are present.    Neurological: Tone, power and sensation are normal.    Skin: No lesions are seen  or palpated.    Lymph Nodes: No lymphadenopathy is present.                                  7.2    15.68 )-----------( 196      ( 24 Oct 2024 02:05 )             22.2     10-24    137  |  100  |  41[H]  ----------------------------<  228[H]  3.4[L]   |  21[L]  |  4.22[H]    Ca    8.7      24 Oct 2024 02:10  Phos  3.1     10-24  Mg     2.2     10-24    TPro  7.1  /  Alb  1.8[L]  /  TBili  0.4  /  DBili  x   /  AST  72[H]  /  ALT  170[H]  /  AlkPhos  128[H]  10-24    Urinalysis Basic - ( 24 Oct 2024 02:10 )    Color: x / Appearance: x / SG: x / pH: x  Gluc: 228 mg/dL / Ketone: x  / Bili: x / Urobili: x   Blood: x / Protein: x / Nitrite: x   Leuk Esterase: x / RBC: x / WBC x   Sq Epi: x / Non Sq Epi: x / Bacteria: x

## 2024-10-24 NOTE — CHART NOTE - NSCHARTNOTEFT_GEN_A_CORE
ICU DOWN GRADE NOTE      Patient is a 73y old  Male who presents with a chief complaint of Sepsis and acute hypoxic respiratory failure secondary to suspected aspiration PNA vs. HCAP (23 Oct 2024 15:13)      HPI:  Megha Art is a 73 year old male with PMHx of HTN, IDDM2, ESRD on HD (M/W/F, through RUE AV fistula), hx of CVA x 2 (with residual R. sided weakness and dysphagia s/p PEG tube, previously with tracheostomy and now removed), and hx of RLE DVT (completed apixaban course) who presented to the ED on 10/4/24 for complaints of cough and chills.    History obtained from wife at bedside. As per wife, patient typically coughs at baseline since he previously had a tracheostomy. However, for the past 2-3 days, he has been coughing more frequently and it has been productive with yellow phlegm. Wife was assisting him with getting dressed this afternoon around 1 PM when he felt nauseous and started complaining of abdominal pain. Then had a bowel movement. When wife was cleaning him up, patient was shaking "like he had chills" and she thinks his eyes rolled back for a few seconds. No loss of consciousness. EMS was called and another episode of shaking upon EMS arrival. Of note, patient is due for dialysis today but did not receive it since he came to the ER today. Baseline functional status is wheelchair bound and dependent with all ADLs. NPO with Nepro q4. Lives at home with wife.     In the ED, Tmax 101.3, HR as elevated as 118, BP as elevated as 201/68, and SpO2 as low as 91% on room air. Initially placed on 15L NRB with improvement of SpO2 to 95%. Now on room air. WBC 17.38K, hgb 9.9, sodium 129, BUN 61, Cr 4.05, alkphos 169. Lactic acid 3.6. CT head without evidence of acute hemorrhage mass or mass effect but with encephalomalacia and gliosis again seen involving the bifrontal region. CT CAP with pneumonia and rectum distended with stool. Received  cc bolus, vancomycin 1 g IV, zosyn 3.375 g IV, acetaminophen 1 g IV, hydralazine 10 mg IV, pantoprazole 40 mg IV, and ondansetron 4 mg IV. Evaluated by nephrology who is planning for HD tomorrow. (04 Oct 2024 22:03)      INTERVAL HPI/OVERNIGHT EVENTS: S/P trach 10/23 with SpO2 and respiratory status stable. at baseline MS. Chronic PEG in place. Shakes head "no" when asked if in any pain or is anything bothering him.         REVIEW OF SYSTEMS:  all negative unless listed wtihin interval HPI       MEDICATIONS:  acetaminophen   Oral Liquid .. 650 milliGRAM(s) Oral every 6 hours PRN  albuterol/ipratropium for Nebulization 3 milliLiter(s) Nebulizer every 6 hours  chlorhexidine 0.12% Liquid 15 milliLiter(s) Oral Mucosa every 12 hours  chlorhexidine 2% Cloths 1 Application(s) Topical <User Schedule>  epoetin reilly-epbx (RETACRIT) Injectable 07796 Unit(s) IV Push <User Schedule>  ferrous    sulfate Liquid 300 milliGRAM(s) Enteral Tube daily  heparin   Injectable 5000 Unit(s) SubCutaneous every 12 hours  HYDROmorphone  Injectable 0.5 milliGRAM(s) IV Push every 4 hours PRN  insulin lispro (ADMELOG) corrective regimen sliding scale   SubCutaneous every 6 hours  Nephro-noam 1 Tablet(s) Oral daily  pantoprazole   Suspension 40 milliGRAM(s) Enteral Tube daily  polyethylene glycol 3350 17 Gram(s) Oral daily  senna 2 Tablet(s) Oral at bedtime  sodium chloride 3%  Inhalation 4 milliLiter(s) Inhalation every 6 hours      T(C): 38.6 (10-24-24 @ 05:00), Max: 38.6 (10-24-24 @ 05:00)  HR: 87 (10-24-24 @ 09:00) (77 - 98)  BP: 148/64 (10-24-24 @ 09:00) (102/52 - 172/65)  RR: 20 (10-24-24 @ 09:00) (15 - 26)  SpO2: 96% (10-24-24 @ 09:00) (93% - 100%)  Wt(kg): --Vital Signs Last 24 Hrs  T(C): 38.6 (24 Oct 2024 05:00), Max: 38.6 (24 Oct 2024 05:00)  T(F): 101.5 (24 Oct 2024 05:00), Max: 101.5 (24 Oct 2024 05:00)  HR: 87 (24 Oct 2024 09:00) (77 - 98)  BP: 148/64 (24 Oct 2024 09:00) (102/52 - 172/65)  BP(mean): 87 (24 Oct 2024 09:00) (68 - 89)  RR: 20 (24 Oct 2024 09:00) (15 - 26)  SpO2: 96% (24 Oct 2024 09:00) (93% - 100%)    Parameters below as of 24 Oct 2024 08:15  Patient On (Oxygen Delivery Method): ventilator        PHYSICAL EXAM:  Gen: lying comfortably in bed in no apparent distress  HEENT: PERRL, conjunctiva and sclera clear, MMM,  (+) Trach   Lungs: B/l mild rhonchi/ mechanical breath sounds  Heart: RRR  Abd: Soft/+BS/ Non-tender, ND  Ext: Atrophy LE  Neuro: awake, alert, non verbal     Consultant(s) Notes Reviewed:  [x ] YES  [ ] NO  Care Discussed with Consultants/Other Providers [ x] YES  [ ] NO    LABS:                        7.2    15.68 )-----------( 196      ( 24 Oct 2024 02:05 )             22.2     10-24    137  |  100  |  41[H]  ----------------------------<  228[H]  3.4[L]   |  21[L]  |  4.22[H]    Ca    8.7      24 Oct 2024 02:10  Phos  3.1     10-24  Mg     2.2     10-24    TPro  7.1  /  Alb  1.8[L]  /  TBili  0.4  /  DBili  x   /  AST  72[H]  /  ALT  170[H]  /  AlkPhos  128[H]  10-24    PT/INR - ( 23 Oct 2024 02:40 )   PT: 14.7 sec;   INR: 1.30 ratio         PTT - ( 23 Oct 2024 02:40 )  PTT:28.8 sec  Urinalysis Basic - ( 24 Oct 2024 02:10 )    Color: x / Appearance: x / SG: x / pH: x  Gluc: 228 mg/dL / Ketone: x  / Bili: x / Urobili: x   Blood: x / Protein: x / Nitrite: x   Leuk Esterase: x / RBC: x / WBC x   Sq Epi: x / Non Sq Epi: x / Bacteria: x      CAPILLARY BLOOD GLUCOSE      POCT Blood Glucose.: 292 mg/dL (24 Oct 2024 05:34)  POCT Blood Glucose.: 255 mg/dL (23 Oct 2024 22:56)  POCT Blood Glucose.: 230 mg/dL (23 Oct 2024 17:07)  POCT Blood Glucose.: 202 mg/dL (23 Oct 2024 12:24)        Urinalysis Basic - ( 24 Oct 2024 02:10 )    Color: x / Appearance: x / SG: x / pH: x  Gluc: 228 mg/dL / Ketone: x  / Bili: x / Urobili: x   Blood: x / Protein: x / Nitrite: x   Leuk Esterase: x / RBC: x / WBC x   Sq Epi: x / Non Sq Epi: x / Bacteria: x        RADIOLOGY & ADDITIONAL TESTS:    Imaging Personally Reviewed:  [x ] YES  [ ] NO      Assessment/ To follow up:      72 yo M with h/o HTN, DM2, ESRD on HD (MWF, RUE AV Fistula), CVA with residual R sided weakness and dysphagia s/p peg, s/p trach since decannulated, RLE DVT (completed course of Eliquis) admitted with 1) RLL PNA with course complicated by acute hypoxic respiratory failure ultimately requiring intubation, failed trial of extubation and was subsequently reintubated 10/14 2) Septic shock 2 to PNA    Resp/GI/Social: Pt is full code. s/p Trach yesterday 10/23/2024. Pulmonary follow up post trach  ID: completed course of Levaquin for zosyn resistant pseudomonas PNA. observe off abx.  CVS: HD Stable off vasopressors.   HEME: DVT prophylaxis with sq Heparin  FEN: Severe protein-calorie malnutrition and Underweight (BMI < 19); cont enteral feeds  Endo: Follow FS and cover with Lispro according to set parameters  Renal: HD as per Renal as needed.  Neuro: analgesia for pain.  Social: LTAC planning    Plan discussed with ICU MD Arce and sign out discussed with Hospitalist MD Martell.

## 2024-10-24 NOTE — PROGRESS NOTE ADULT - ASSESSMENT
74 yo M with h/o HTN, DM2, ESRD on HD (MWF, RUE AV Fistula), CVA with residual R sided weakness and dysphagia s/p peg, s/p trach since decannulated, RLE DVT (completed course of Eliquis) admitted with 1) RLL PNA with course complicated by acute hypoxic respiratory failure ultimately requiring intubation, failed trial of extubation and was subsequently reintubated 10/14 2) Septic shock 2 to PNA    Resp/GI/Social: Pt is full code. s/p Trach. C/D/I  ID: completed abx course. observe off abx.  CVS: Stable off vasopressors.   HEME: DVT prophylaxis with sq Heparin  FEN: Severe protein-calorie malnutrition and Underweight (BMI < 19); cont enteral feeds  Endo: Follow FS and cover with Lispro according to set parameters  Renal: HD as per Renal as needed.  Neuro: analgesia for pain/Trach.  Dispo: Downgrade for continue long-term vent weaning/rehab.

## 2024-10-24 NOTE — CHART NOTE - NSCHARTNOTEFT_GEN_A_CORE
Surgicel around the Trach site removed at bedside. Hemostasis confirmed, patient tolerated it well.    Surgery will sign off. Appreciate Care per ICU

## 2024-10-25 LAB
ALBUMIN SERPL ELPH-MCNC: 1.7 G/DL — LOW (ref 3.3–5)
ALP SERPL-CCNC: 153 U/L — HIGH (ref 40–120)
ALT FLD-CCNC: 45 U/L — SIGNIFICANT CHANGE UP (ref 12–78)
ANION GAP SERPL CALC-SCNC: 12 MMOL/L — SIGNIFICANT CHANGE UP (ref 5–17)
AST SERPL-CCNC: 45 U/L — HIGH (ref 15–37)
BASOPHILS # BLD AUTO: 0.02 K/UL — SIGNIFICANT CHANGE UP (ref 0–0.2)
BASOPHILS NFR BLD AUTO: 0.1 % — SIGNIFICANT CHANGE UP (ref 0–2)
BILIRUB SERPL-MCNC: 0.4 MG/DL — SIGNIFICANT CHANGE UP (ref 0.2–1.2)
BUN SERPL-MCNC: 28 MG/DL — HIGH (ref 7–23)
CALCIUM SERPL-MCNC: 8.7 MG/DL — SIGNIFICANT CHANGE UP (ref 8.5–10.1)
CHLORIDE SERPL-SCNC: 99 MMOL/L — SIGNIFICANT CHANGE UP (ref 96–108)
CO2 SERPL-SCNC: 24 MMOL/L — SIGNIFICANT CHANGE UP (ref 22–31)
CREAT SERPL-MCNC: 2.81 MG/DL — HIGH (ref 0.5–1.3)
EGFR: 23 ML/MIN/1.73M2 — LOW
EOSINOPHIL # BLD AUTO: 0.04 K/UL — SIGNIFICANT CHANGE UP (ref 0–0.5)
EOSINOPHIL NFR BLD AUTO: 0.3 % — SIGNIFICANT CHANGE UP (ref 0–6)
GLUCOSE BLDC GLUCOMTR-MCNC: 251 MG/DL — HIGH (ref 70–99)
GLUCOSE BLDC GLUCOMTR-MCNC: 276 MG/DL — HIGH (ref 70–99)
GLUCOSE BLDC GLUCOMTR-MCNC: 307 MG/DL — HIGH (ref 70–99)
GLUCOSE BLDC GLUCOMTR-MCNC: 394 MG/DL — HIGH (ref 70–99)
GLUCOSE SERPL-MCNC: 173 MG/DL — HIGH (ref 70–99)
HCT VFR BLD CALC: 22 % — LOW (ref 39–50)
HGB BLD-MCNC: 7.1 G/DL — LOW (ref 13–17)
IMM GRANULOCYTES NFR BLD AUTO: 0.6 % — SIGNIFICANT CHANGE UP (ref 0–0.9)
LYMPHOCYTES # BLD AUTO: 0.65 K/UL — LOW (ref 1–3.3)
LYMPHOCYTES # BLD AUTO: 4.1 % — LOW (ref 13–44)
MAGNESIUM SERPL-MCNC: 1.9 MG/DL — SIGNIFICANT CHANGE UP (ref 1.6–2.6)
MCHC RBC-ENTMCNC: 23.9 PG — LOW (ref 27–34)
MCHC RBC-ENTMCNC: 32.3 G/DL — SIGNIFICANT CHANGE UP (ref 32–36)
MCV RBC AUTO: 74.1 FL — LOW (ref 80–100)
MONOCYTES # BLD AUTO: 0.64 K/UL — SIGNIFICANT CHANGE UP (ref 0–0.9)
MONOCYTES NFR BLD AUTO: 4 % — SIGNIFICANT CHANGE UP (ref 2–14)
NEUTROPHILS # BLD AUTO: 14.55 K/UL — HIGH (ref 1.8–7.4)
NEUTROPHILS NFR BLD AUTO: 90.9 % — HIGH (ref 43–77)
NRBC # BLD: 0 /100 WBCS — SIGNIFICANT CHANGE UP (ref 0–0)
PHOSPHATE SERPL-MCNC: 1.8 MG/DL — LOW (ref 2.5–4.5)
PLATELET # BLD AUTO: 194 K/UL — SIGNIFICANT CHANGE UP (ref 150–400)
POTASSIUM SERPL-MCNC: 3.8 MMOL/L — SIGNIFICANT CHANGE UP (ref 3.5–5.3)
POTASSIUM SERPL-SCNC: 3.8 MMOL/L — SIGNIFICANT CHANGE UP (ref 3.5–5.3)
PROT SERPL-MCNC: 7.2 GM/DL — SIGNIFICANT CHANGE UP (ref 6–8.3)
RAPID RVP RESULT: SIGNIFICANT CHANGE UP
RBC # BLD: 2.97 M/UL — LOW (ref 4.2–5.8)
RBC # FLD: 24.4 % — HIGH (ref 10.3–14.5)
SARS-COV-2 RNA SPEC QL NAA+PROBE: SIGNIFICANT CHANGE UP
SODIUM SERPL-SCNC: 135 MMOL/L — SIGNIFICANT CHANGE UP (ref 135–145)
WBC # BLD: 15.99 K/UL — HIGH (ref 3.8–10.5)
WBC # FLD AUTO: 15.99 K/UL — HIGH (ref 3.8–10.5)

## 2024-10-25 PROCEDURE — 99233 SBSQ HOSP IP/OBS HIGH 50: CPT

## 2024-10-25 PROCEDURE — 99232 SBSQ HOSP IP/OBS MODERATE 35: CPT

## 2024-10-25 RX ORDER — DIBASIC SODIUM PHOSPHATE, MONOBASIC POTASSIUM PHOSPHATE AND MONOBASIC SODIUM PHOSPHATE 852; 155; 130 MG/1; MG/1; MG/1
2 TABLET ORAL ONCE
Refills: 0 | Status: COMPLETED | OUTPATIENT
Start: 2024-10-25 | End: 2024-10-25

## 2024-10-25 RX ORDER — DIBASIC SODIUM PHOSPHATE, MONOBASIC POTASSIUM PHOSPHATE AND MONOBASIC SODIUM PHOSPHATE 852; 155; 130 MG/1; MG/1; MG/1
1 TABLET ORAL
Refills: 0 | Status: COMPLETED | OUTPATIENT
Start: 2024-10-25 | End: 2024-10-26

## 2024-10-25 RX ADMIN — DIBASIC SODIUM PHOSPHATE, MONOBASIC POTASSIUM PHOSPHATE AND MONOBASIC SODIUM PHOSPHATE 2 PACKET(S): 852; 155; 130 TABLET ORAL at 06:28

## 2024-10-25 RX ADMIN — IPRATROPIUM BROMIDE AND ALBUTEROL SULFATE 3 MILLILITER(S): .5; 2.5 SOLUTION RESPIRATORY (INHALATION) at 00:45

## 2024-10-25 RX ADMIN — HEPARIN SODIUM 5000 UNIT(S): 10000 INJECTION INTRAVENOUS; SUBCUTANEOUS at 05:43

## 2024-10-25 RX ADMIN — SODIUM CHLORIDE 4 MILLILITER(S): 9 INJECTION, SOLUTION INTRAMUSCULAR; INTRAVENOUS; SUBCUTANEOUS at 17:33

## 2024-10-25 RX ADMIN — CHLORHEXIDINE GLUCONATE 1 APPLICATION(S): 40 SOLUTION TOPICAL at 05:44

## 2024-10-25 RX ADMIN — Medication 6: at 17:52

## 2024-10-25 RX ADMIN — Medication 6: at 05:43

## 2024-10-25 RX ADMIN — CHLORHEXIDINE GLUCONATE 15 MILLILITER(S): 40 SOLUTION TOPICAL at 17:42

## 2024-10-25 RX ADMIN — Medication 8: at 23:54

## 2024-10-25 RX ADMIN — Medication 300 MILLIGRAM(S): at 12:16

## 2024-10-25 RX ADMIN — Medication 10: at 12:18

## 2024-10-25 RX ADMIN — Medication 650 MILLIGRAM(S): at 06:28

## 2024-10-25 RX ADMIN — DIBASIC SODIUM PHOSPHATE, MONOBASIC POTASSIUM PHOSPHATE AND MONOBASIC SODIUM PHOSPHATE 1 PACKET(S): 852; 155; 130 TABLET ORAL at 23:55

## 2024-10-25 RX ADMIN — Medication 650 MILLIGRAM(S): at 07:22

## 2024-10-25 RX ADMIN — IPRATROPIUM BROMIDE AND ALBUTEROL SULFATE 3 MILLILITER(S): .5; 2.5 SOLUTION RESPIRATORY (INHALATION) at 12:01

## 2024-10-25 RX ADMIN — SODIUM CHLORIDE 4 MILLILITER(S): 9 INJECTION, SOLUTION INTRAMUSCULAR; INTRAVENOUS; SUBCUTANEOUS at 12:01

## 2024-10-25 RX ADMIN — CHLORHEXIDINE GLUCONATE 15 MILLILITER(S): 40 SOLUTION TOPICAL at 05:43

## 2024-10-25 RX ADMIN — SODIUM CHLORIDE 4 MILLILITER(S): 9 INJECTION, SOLUTION INTRAMUSCULAR; INTRAVENOUS; SUBCUTANEOUS at 05:40

## 2024-10-25 RX ADMIN — IPRATROPIUM BROMIDE AND ALBUTEROL SULFATE 3 MILLILITER(S): .5; 2.5 SOLUTION RESPIRATORY (INHALATION) at 05:39

## 2024-10-25 RX ADMIN — SODIUM CHLORIDE 4 MILLILITER(S): 9 INJECTION, SOLUTION INTRAMUSCULAR; INTRAVENOUS; SUBCUTANEOUS at 00:45

## 2024-10-25 RX ADMIN — PANTOPRAZOLE SODIUM 40 MILLIGRAM(S): 40 TABLET, DELAYED RELEASE ORAL at 12:16

## 2024-10-25 RX ADMIN — HEPARIN SODIUM 5000 UNIT(S): 10000 INJECTION INTRAVENOUS; SUBCUTANEOUS at 17:42

## 2024-10-25 RX ADMIN — Medication 1 TABLET(S): at 12:16

## 2024-10-25 RX ADMIN — DIBASIC SODIUM PHOSPHATE, MONOBASIC POTASSIUM PHOSPHATE AND MONOBASIC SODIUM PHOSPHATE 1 PACKET(S): 852; 155; 130 TABLET ORAL at 18:05

## 2024-10-25 RX ADMIN — IPRATROPIUM BROMIDE AND ALBUTEROL SULFATE 3 MILLILITER(S): .5; 2.5 SOLUTION RESPIRATORY (INHALATION) at 17:33

## 2024-10-25 NOTE — PROGRESS NOTE ADULT - NS ATTEND AMEND GEN_ALL_CORE FT
pt seen and examined at bedside    downgraded from ICU 10/24  intubated 10/8 for hypoxic respiratory failure (failed high flow and bipap)  extubated 10/10  reintubated 10/14 for recurrent hypoxia  s/p trach 10/23    Tmax yesterday 101.7  febrile this morning 100.5  blood cx and sputum cx ordered      73M PMH HTN, DM, ESRD on HD (RUE AVF), CVA 5/2024 with residual R weakness and dysphagia s/p PEG (5/17/24) and trach (5/14/24) subsequently decannulated 7/5/24, R common iliac DVT (5/2024) s/p apixiban, COVID PNA 7/2024 with hypoxia requiring high flow (s/p treatment with remdesivir and dexamethasone, weaned off O2 supplementation) presents with cough and sputum production. Febrile in ED with noted leukocytosis. CT chest showed RLL pneumonia. Course with worsening acute hypoxemic respiratory failure, RRTs for hypoxia. Upgraded to ICU 10/8 for worsening hypoxemia high flow -> BiPAP. Intubated 10/8 for hypoxia due to PNA and thick secretions. s/p bronchoscopy 10/8: BAL cx with pseudomonas and Stenotrophomonas. Weaned and extubated 10/10, failed extubation and reintubated for hypoxia 10/14 s/p repeat bronchoscopy with thick secretions retrieved cultures with pseudomonas. s/p trach 10/23. Downgraded to medical service 10/24. Febrile now.     DX: acute hypoxic respiratory failure requiring intubation/reintubation, failure to wean s/p trach, gram negative PNA (pseudomonas and stenotrophomonas), ESRD on HD, HTN, sepsis present on admission    - s/p levaquin 10/16 - 10/22 for pseudomonas/stenotrophomonas in bronch cx  - s/p bactrim 10/12 - 10/16 for stenotrophomonas  - s/p zosyn 10/4 - 10/8  - daily weaning with pressure support as tolerates during the day  - full vent support at night  - cont duonebs q6 hrs + hypersal nebs for impaired secretion mobilization  - s/p trach POD #2 (#7 portex in place)  - local trach care  - tracheal suctioning as needed  - HD per nephrology  - DVT ppx: on heparin sc  - remainder of care per primary team  - full code

## 2024-10-25 NOTE — PROGRESS NOTE ADULT - SUBJECTIVE AND OBJECTIVE BOX
CHIEF COMPLAINT: hypoxic respiratory failure failure       Interval Events:  10/23 trach       OBJECTIVE:  ICU Vital Signs Last 24 Hrs  T(C): 38.1 (25 Oct 2024 11:01), Max: 38.3 (25 Oct 2024 06:00)  T(F): 100.5 (25 Oct 2024 11:01), Max: 100.9 (25 Oct 2024 06:00)  HR: 86 (25 Oct 2024 10:30) (86 - 104)  BP: 135/62 (25 Oct 2024 10:00) (113/66 - 155/64)  BP(mean): 83 (25 Oct 2024 10:00) (72 - 92)  ABP: --  ABP(mean): --  RR: 19 (25 Oct 2024 10:00) (15 - 22)  SpO2: 100% (25 Oct 2024 10:30) (93% - 100%)    O2 Parameters below as of 25 Oct 2024 06:16  Patient On (Oxygen Delivery Method): ventilator          Mode: CPAP with PS, FiO2: 30, PEEP: 5, PS: 10, MAP: 9, PIP: 15    10-24 @ 07:01  -  10-25 @ 07:00  --------------------------------------------------------  IN: 790 mL / OUT: 1590 mL / NET: -800 mL    10-25 @ 07:01  -  10-25 @ 11:56  --------------------------------------------------------  IN: 135 mL / OUT: 0 mL / NET: 135 mL      CAPILLARY BLOOD GLUCOSE      POCT Blood Glucose.: 251 mg/dL (25 Oct 2024 05:36)      PHYSICAL EXAM:  General: well, no distress   HEENT: Sclera clear, EOMI   Neck: trach   Respiratory: CTA B/L no wheezing Cough stridor  Cardiovascular: S1S2 RRR  Abdomen: soft + BS   Extremities: no edema, THAI   Skin: no rash / breakdown  Neurological: no focal deficits   Psychiatry: appropriate     HOSPITAL MEDICATIONS:  heparin   Injectable 5000 Unit(s) SubCutaneous every 12 hours  insulin lispro (ADMELOG) corrective regimen sliding scale   SubCutaneous every 6 hours  albuterol/ipratropium for Nebulization 3 milliLiter(s) Nebulizer every 6 hours  sodium chloride 3%  Inhalation 4 milliLiter(s) Inhalation every 6 hours  acetaminophen   Oral Liquid .. 650 milliGRAM(s) Oral every 6 hours PRN  HYDROmorphone  Injectable 0.5 milliGRAM(s) IV Push every 4 hours PRN  pantoprazole   Suspension 40 milliGRAM(s) Enteral Tube daily  polyethylene glycol 3350 17 Gram(s) Oral daily  senna 2 Tablet(s) Oral at bedtime  ferrous    sulfate Liquid 300 milliGRAM(s) Enteral Tube daily  Nephro-noam 1 Tablet(s) Oral daily  potassium phosphate / sodium phosphate Powder (PHOS-NaK) 1 Packet(s) Oral four times a day    epoetin reilly-epbx (RETACRIT) Injectable 65904 Unit(s) IV Push <User Schedule>    chlorhexidine 0.12% Liquid 15 milliLiter(s) Oral Mucosa every 12 hours  chlorhexidine 2% Cloths 1 Application(s) Topical <User Schedule>        LABS:                        7.1    15.99 )-----------( 194      ( 25 Oct 2024 02:40 )             22.0     10-25    135  |  99  |  28[H]  ----------------------------<  173[H]  3.8   |  24  |  2.81[H]    Ca    8.7      25 Oct 2024 02:40  Phos  1.8     10-25  Mg     1.9     10-25    TPro  7.2  /  Alb  1.7[L]  /  TBili  0.4  /  DBili  x   /  AST  45[H]  /  ALT  45  /  AlkPhos  153[H]  10-25              MICROBIOLOGY:   Culture Results:   No growth at 5 days (10-18-24 @ 11:10)  Culture Results:   No acid-fast bacilli isolated at 1 week. ****Acid-fast cultures are held  for 6 weeks.**** (10-14-24 @ 11:20)  Culture Results:   Few Pseudomonas aeruginosa  Commensal dominick consistent with body site (10-14-24 @ 11:20)  Culture Results:   Few Pseudomonas aeruginosa  Moderate Stenotrophomonas maltophilia (10-08-24 @ 20:40)  Culture Results:   No acid-fast bacilli isolated at 2 weeks. (10-08-24 @ 20:40)  Culture Results:   No fungus isolated at 2 weeks. (10-08-24 @ 20:40)  Culture Results:   No enteric pathogens isolated.  (Stool culture examined for Salmonella,  Shigella, Campylobacter, Aeromonas, Plesiomonas,  Vibrio, E.coli O157 and Yersinia) (10-08-24 @ 10:00)  Culture Results:   No Protozoa seen by trichrome stain  No Helminths or Protozoa seen in formalin concentrate  performed by iodine stain  (routine O+P not evaluated for Microsporidia,  Cryptosporidia or Cyclospora)  One negative sample does not necessarily rule  out the presence of a parasitic infection. (10-08-24 @ 10:00)  Culture Results:   No growth at 5 days (10-07-24 @ 18:35)  Culture Results:   No growth at 5 days (10-07-24 @ 18:30)  Culture Results:   No growth at 5 days (10-04-24 @ 16:40)  Culture Results:   No growth at 5 days (10-04-24 @ 16:30)    LIVER FUNCTIONS - ( 25 Oct 2024 02:40 )  Alb: 1.7 g/dL / Pro: 7.2 gm/dL / ALK PHOS: 153 U/L / ALT: 45 U/L / AST: 45 U/L / GGT: x             Urinalysis Basic - ( 25 Oct 2024 02:40 )    Color: x / Appearance: x / SG: x / pH: x  Gluc: 173 mg/dL / Ketone: x  / Bili: x / Urobili: x   Blood: x / Protein: x / Nitrite: x   Leuk Esterase: x / RBC: x / WBC x   Sq Epi: x / Non Sq Epi: x / Bacteria: x      RADIOLOGY:  ========  < from: Xray Chest 1 View- PORTABLE-Urgent (Xray Chest 1 View- PORTABLE-Urgent .) (10.23.24 @ 13:37) >  PULMONARY:  LEFT retrocardiac airspace consolidation/atelectasis and/or effusion   obscures diaphragm contour.  LEFT upper zones and visualized RIGHT lung parenchyma grosslyclear.  No pneumothorax..    HEART/VASCULAR: The heart size and mediastinum configuration are within   the limits of normal.    BONES: The visualized osseous thorax is intact.    IMPRESSION:  Tracheostomy tube in place. LEFT retrocardiac airspace consolidation   and/or atelectasis.    < end of copied text >      EKG/ECHO:  ==========  CONCLUSIONS:     1. Left ventricular systolic function is normal with an ejection fraction of 63 % by Tian's method of disks. There are no regional wall motion abnormalities seen.   2. Compared to the transthoracic echocardiogram performed on 4/30/2024, there have been no significant interval changes.   CHIEF COMPLAINT: hypoxic respiratory failure failure       Interval Events:  10/23 trach       OBJECTIVE:  Vital Signs Last 24 Hrs  T(C): 38.1 (25 Oct 2024 11:01), Max: 38.3 (25 Oct 2024 06:00)  T(F): 100.5 (25 Oct 2024 11:01), Max: 100.9 (25 Oct 2024 06:00)  HR: 86 (25 Oct 2024 10:30) (86 - 104)  BP: 135/62 (25 Oct 2024 10:00) (113/66 - 155/64)  BP(mean): 83 (25 Oct 2024 10:00) (72 - 92)  RR: 19 (25 Oct 2024 10:00) (15 - 22)  SpO2: 100% (25 Oct 2024 10:30) (93% - 100%)    O2 Parameters below as of 25 Oct 2024 06:16  Patient On (Oxygen Delivery Method): ventilator          Mode: CPAP with PS, FiO2: 30, PEEP: 5, PS: 10, MAP: 9, PIP: 15    10-24 @ 07:01  -  10-25 @ 07:00  --------------------------------------------------------  IN: 790 mL / OUT: 1590 mL / NET: -800 mL    10-25 @ 07:01  -  10-25 @ 11:56  --------------------------------------------------------  IN: 135 mL / OUT: 0 mL / NET: 135 mL      CAPILLARY BLOOD GLUCOSE  POCT Blood Glucose.: 251 mg/dL (25 Oct 2024 05:36)      PHYSICAL EXAM:  General: thin, trached to vent, no distress   HEENT: Sclera clear, EOMI   Neck: trach   Respiratory: CTA B/L no wheezing Cough stridor, mechanical breath sounds  Cardiovascular: S1S2 RRR  Abdomen: soft + BS +PEG  Extremities: no edema, no cyanosis  Skin: no rash / breakdown  Neurological: arousable, opens eyes       HOSPITAL MEDICATIONS:  heparin   Injectable 5000 Unit(s) SubCutaneous every 12 hours  insulin lispro (ADMELOG) corrective regimen sliding scale   SubCutaneous every 6 hours  albuterol/ipratropium for Nebulization 3 milliLiter(s) Nebulizer every 6 hours  sodium chloride 3%  Inhalation 4 milliLiter(s) Inhalation every 6 hours  acetaminophen   Oral Liquid .. 650 milliGRAM(s) Oral every 6 hours PRN  HYDROmorphone  Injectable 0.5 milliGRAM(s) IV Push every 4 hours PRN  pantoprazole   Suspension 40 milliGRAM(s) Enteral Tube daily  polyethylene glycol 3350 17 Gram(s) Oral daily  senna 2 Tablet(s) Oral at bedtime  ferrous    sulfate Liquid 300 milliGRAM(s) Enteral Tube daily  Nephro-noam 1 Tablet(s) Oral daily  potassium phosphate / sodium phosphate Powder (PHOS-NaK) 1 Packet(s) Oral four times a day    epoetin reilly-epbx (RETACRIT) Injectable 92892 Unit(s) IV Push <User Schedule>    chlorhexidine 0.12% Liquid 15 milliLiter(s) Oral Mucosa every 12 hours  chlorhexidine 2% Cloths 1 Application(s) Topical <User Schedule>        LABS:                        7.1    15.99 )-----------( 194      ( 25 Oct 2024 02:40 )             22.0     10-25    135  |  99  |  28[H]  ----------------------------<  173[H]  3.8   |  24  |  2.81[H]    Ca    8.7      25 Oct 2024 02:40  Phos  1.8     10-25  Mg     1.9     10-25    TPro  7.2  /  Alb  1.7[L]  /  TBili  0.4  /  DBili  x   /  AST  45[H]  /  ALT  45  /  AlkPhos  153[H]  10-25              MICROBIOLOGY:   Culture Results:   No growth at 5 days (10-18-24 @ 11:10)  Culture Results:   No acid-fast bacilli isolated at 1 week. ****Acid-fast cultures are held  for 6 weeks.**** (10-14-24 @ 11:20)  Culture Results:   Few Pseudomonas aeruginosa  Commensal dominick consistent with body site (10-14-24 @ 11:20)  Culture Results:   Few Pseudomonas aeruginosa  Moderate Stenotrophomonas maltophilia (10-08-24 @ 20:40)  Culture Results:   No acid-fast bacilli isolated at 2 weeks. (10-08-24 @ 20:40)  Culture Results:   No fungus isolated at 2 weeks. (10-08-24 @ 20:40)  Culture Results:   No enteric pathogens isolated.  (Stool culture examined for Salmonella,  Shigella, Campylobacter, Aeromonas, Plesiomonas,  Vibrio, E.coli O157 and Yersinia) (10-08-24 @ 10:00)  Culture Results:   No Protozoa seen by trichrome stain  No Helminths or Protozoa seen in formalin concentrate  performed by iodine stain  (routine O+P not evaluated for Microsporidia,  Cryptosporidia or Cyclospora)  One negative sample does not necessarily rule  out the presence of a parasitic infection. (10-08-24 @ 10:00)  Culture Results:   No growth at 5 days (10-07-24 @ 18:35)  Culture Results:   No growth at 5 days (10-07-24 @ 18:30)  Culture Results:   No growth at 5 days (10-04-24 @ 16:40)  Culture Results:   No growth at 5 days (10-04-24 @ 16:30)    LIVER FUNCTIONS - ( 25 Oct 2024 02:40 )  Alb: 1.7 g/dL / Pro: 7.2 gm/dL / ALK PHOS: 153 U/L / ALT: 45 U/L / AST: 45 U/L / GGT: x                   RADIOLOGY:  ========  < from: Xray Chest 1 View- PORTABLE-Urgent (Xray Chest 1 View- PORTABLE-Urgent .) (10.23.24 @ 13:37) >  PULMONARY:  LEFT retrocardiac airspace consolidation/atelectasis and/or effusion   obscures diaphragm contour.  LEFT upper zones and visualized RIGHT lung parenchyma grosslyclear.  No pneumothorax..    HEART/VASCULAR: The heart size and mediastinum configuration are within   the limits of normal.    BONES: The visualized osseous thorax is intact.    IMPRESSION:  Tracheostomy tube in place. LEFT retrocardiac airspace consolidation   and/or atelectasis.    < end of copied text >      EKG/ECHO:  ==========  CONCLUSIONS:     1. Left ventricular systolic function is normal with an ejection fraction of 63 % by Tian's method of disks. There are no regional wall motion abnormalities seen.   2. Compared to the transthoracic echocardiogram performed on 4/30/2024, there have been no significant interval changes.

## 2024-10-25 NOTE — PROGRESS NOTE ADULT - ASSESSMENT
Megha Art is a 73 year old male with PMHx of HTN, IDDM2, ESRD on HD (M/W/F, through RUE AV fistula), hx of CVA x 2 (with residual R. sided weakness and dysphagia s/p PEG tube, previously with tracheostomy and now removed), and hx of RLE DVT (completed apixaban course) who presented to the ED on 10/4/24 for complaints of cough and chills. In the ED, Tmax 101.3, HR as elevated as 118, BP as elevated as 201/68, and SpO2 as low as 91% on room air. Initially placed on 15L NRB with improvement of SpO2 to 95%. Now on room air. WBC 17.38K, hgb 9.9, sodium 129, BUN 61, Cr 4.05, alkphos 169. Lactic acid 3.6. CT head without evidence of acute hemorrhage mass or mass effect but with encephalomalacia and gliosis again seen involving the bifrontal region. CT CAP with pneumonia and rectum distended with stool. Received  cc bolus, vancomycin 1 g IV, zosyn 3.375 g IV, acetaminophen 1 g IV, hydralazine 10 mg IV, pantoprazole 40 mg IV, and ondansetron 4 mg IV. Evaluated by nephrology who is planning for HD tomorrow. (04 Oct 2024 22:03)    Hospital course has been complicated with long ICU stay, failed trial of extubation 10/11, reintubation on 10/14. He is s/p bronch x 2. BAL with PsAg and Stenotrophomonas. He was on Pip-deb from 10/4-->10/15. Switched to Levofloxacin IV on 10/16.  ID was re-called last week for antibiotics management.      10/9: intubated, sedated, getting hemodialysis again today, continue high dose zosyn while awaiting cultures, cultures negative thus far, s/p bronch and will follow those cultures and adjust antibiotics accordingly   10/10: Afebrile, Intubated, sedated. Noted with Hgb of 5.9 this am. Improved tracheal secretions per RN. On minimal vent settings, possible extubation trial today. HD tomorrow per renal. No growth on blood cultures. Bronch culture with Pseudomonas aeruginosa, pending susceptibilities.  10/11: extubated but wheezing, mucous plug, aggressive PT, awaiting pseudomonas susceptibilities    10/18: Afebrile today. T-max 100.6 F - 100.9 F last 2 days. Remains intubated, on minimal sedation, and one vasopressor. Basurto an episode of desaturation. He is on 90%FiO2. On HD today. No excessive oral secretions or diarrhea as per bedside RN. PsAg and Steno susceptibilities reviewed. MRSA screen negative on 10/10  10/21: nearing end of antibiotics, continue agressive pulmonary toilet, tracheostomy likely this week  10/25-  febrile  t-max-100.9  leukocytosis 15 k decreased from 19K      Impression:  1. Multifocal pneumonia- Dense RLL consolidation  2. Acute respiratory failure requiring intubation  3. Septic shock requiring vasopressor support  4. Rising leukocytosis and low grade fevers  5. ESRD on HD. Prior CVA, s/p PEG, Prior trach and decannulation, IDDM2       Recommendations:  -completed levaquin treatment for pseudomonas pneumonia on 10/21  - in light of new onset fever advise to check blood cx and RVP and new trache asp cx.  -hold abx pending cx results  -vent management as per ICU team.  -Off loading, frequent turns, nutritional optimization to prevent bed sores  -Continue hemodialysis as per renal       d/w ICU team   message sent via teams to hospitalist .    All labs and imaging and chart notes reviewed.     Artie Chirinso MD  Infectious Disease Attending    for any questions please do not hesitate to contact me either via teams or by calling [phone]

## 2024-10-25 NOTE — PROGRESS NOTE ADULT - SUBJECTIVE AND OBJECTIVE BOX
[facility]  INFECTIOUS DISEASES   [address] [place] [address]  Tel: [phone]     Fax: [phone]  ==============================================================================  MD Nelson Ambriz, DO Alisa Rivera, ALMA Melendez M.D  ==============================================================================      JIMMY BLAND  MRN-[id number]  73y ([birth year])      Interval History:    ROS:    [ ] Unobtainable because:  [ ] All other systems negative except as noted    Constitutional: no fever, no chills  Head: no trauma  Eyes: no vision changes, no eye pain  ENT:  no sore throat, no rhinorrhea  Cardiovascular:  no chest pain, no palpitation  Respiratory:  no SOB, no cough  GI:  no abd pain, no vomiting, no diarrhea  urinary: no dysuria, no hematuria, no flank pain  musculoskeletal:  no joint pain, no joint swelling  skin:  no rash  neurology:  no headache, no seizure, no change in mental status  psych: no anxiety, no depression         Allergies  No Known Allergies        ANTIMICROBIALS:        Physical Exam:  Vital Signs Last 24 Hrs  T(C): 38.1 (25 Oct 2024 11:01), Max: 38.3 (25 Oct 2024 06:00)  T(F): 100.5 (25 Oct 2024 11:01), Max: 100.9 (25 Oct 2024 06:00)  HR: 107 (25 Oct 2024 13:52) (86 - 107)  BP: 124/56 (25 Oct 2024 12:40) (113/66 - 153/70)  BP(mean): 76 (25 Oct 2024 12:40) (75 - 92)  RR: 19 (25 Oct 2024 10:00) (15 - 22)  SpO2: 98% (25 Oct 2024 13:52) (85% - 100%)    Parameters below as of 25 Oct 2024 12:40      O2 Concentration (%): 50    10-24-24 @ 07:01  -  10-25-24 @ 07:00  --------------------------------------------------------  IN: 790 mL / OUT: 1590 mL / NET: -800 mL    10-25-24 @ 07:01  -  10-25-24 @ 14:30  --------------------------------------------------------  IN: 135 mL / OUT: 0 mL / NET: 135 mL      General:    NAD,  non toxic  Head: atraumatic, normocephalic  Eye: normal sclera and conjunctiva  ENT:    no oral lesions, neck supple  Cardio:     regular S1, S2,  no murmur  Respiratory:    clear b/l,    no wheezing  abd:     soft,   BS +,   no tenderness  :   no CVAT,  no suprapubic tenderness,   no  belle  Musculoskeletal:   no joint swelling,   no edema  vascular: no central lines, +PIV   Skin:    no rash  Neurologic:     no focal deficit  psych: normal affect    WBC Count: 15.99 K/uL (10-25 @ 02:40)  WBC Count: 15.68 K/uL (10-24 @ 02:05)  WBC Count: 19.58 K/uL (10-23 @ 02:40)  WBC Count: 17.74 K/uL (10-22 @ 03:10)  WBC Count: 18.45 K/uL (10-21 @ 03:55)  WBC Count: 20.59 K/uL (10-20 @ 03:54)  WBC Count: 22.50 K/uL (10-19 @ 03:47)                            7.1    15.99 )-----------( 194      ( 25 Oct 2024 02:40 )             22.0       10-25    135  |  99  |  28[H]  ----------------------------<  173[H]  3.8   |  24  |  2.81[H]    Ca    8.7      25 Oct 2024 02:40  Phos  1.8     10-25  Mg     1.9     10-25    TPro  7.2  /  Alb  1.7[L]  /  TBili  0.4  /  DBili  x   /  AST  45[H]  /  ALT  45  /  AlkPhos  153[H]  10-25      Urinalysis Basic - ( 25 Oct 2024 02:40 )    Color: x / Appearance: x / SG: x / pH: x  Gluc: 173 mg/dL / Ketone: x  / Bili: x / Urobili: x   Blood: x / Protein: x / Nitrite: x   Leuk Esterase: x / RBC: x / WBC x   Sq Epi: x / Non Sq Epi: x / Bacteria: x          Creatinine Trend: 2.81<--, 4.22<--, 2.99<--, 5.81<--, 4.80<--, 3.55<--      MICROBIOLOGY:  v  .Blood BLOOD  10-18-24   No growth at 5 days  --  --      Bronchial  10-14-24   No acid-fast bacilli isolated at 1 week. ****Acid-fast cultures are held  for 6 weeks.****  --  Pseudomonas aeruginosa      Bronchial  10-08-24   Few Pseudomonas aeruginosa  Moderate Stenotrophomonas maltophilia  --  Pseudomonas aeruginosa  Stenotrophomonas maltophilia      .Stool  10-08-24   No enteric pathogens isolated.  (Stool culture examined for Salmonella,  Shigella, Campylobacter, Aeromonas, Plesiomonas,  Vibrio, E.coli O157 and Yersinia)  --  --      .Blood BLOOD  10-07-24   No growth at 5 days  --  --      .Blood BLOOD  10-07-24   No growth at 5 days  --  --      .Blood BLOOD  10-04-24   No growth at 5 days  --  --      .Blood BLOOD  10-04-24   No growth at 5 days  --  --                                  RADIOLOGY:   [facility]  INFECTIOUS DISEASES   [address] [place] [address]  Tel: [phone]     Fax: [phone]  ==============================================================================  MD Nelson Ambriz, DO Alisa Rivera, ALMA Melendez M.D  ==============================================================================      JIMMY BLAND  MRN-[id number]  73y ([birth year])      Interval History:  Covering for  today.    patient on vent via trache.  he is awake    has been having temps today and yesterday  has completed course of levaquin for pseudomonas in trache asp cx as per  on 10/21    patient does not seem to be in distress  has rectal tube   has peg tube  No belle    ROS:    [x ] Unobtainable because:  limited as patient does not respond to most questions but seems to be comfortable        Allergies  No Known Drug  Allergies        ANTIMICROBIALS:        Physical Exam:  Vital Signs Last 24 Hrs  T(C): 38.1 (25 Oct 2024 11:01), Max: 38.3 (25 Oct 2024 06:00)  T(F): 100.5 (25 Oct 2024 11:01), Max: 100.9 (25 Oct 2024 06:00)  HR: 107 (25 Oct 2024 13:52) (86 - 107)  BP: 124/56 (25 Oct 2024 12:40) (113/66 - 153/70)  BP(mean): 76 (25 Oct 2024 12:40) (75 - 92)  RR: 19 (25 Oct 2024 10:00) (15 - 22)  SpO2: 98% (25 Oct 2024 13:52) (85% - 100%)    Parameters below as of 25 Oct 2024 12:40      O2 Concentration (%): 50    10-24-24 @ 07:01  -  10-25-24 @ 07:00  --------------------------------------------------------  IN: 790 mL / OUT: 1590 mL / NET: -800 mL    10-25-24 @ 07:01  -  10-25-24 @ 14:30  --------------------------------------------------------  IN: 135 mL / OUT: 0 mL / NET: 135 mL      General: Chronic ill appearing elderly man  HEENT: trached and on the vent   Cardio:  regular S1, S2,  no murmur  Respiratory:  Breath sounds coarse bilaterally, on vent  abd:  soft,   BS +,   no tenderness, + peg    Musculoskeletal:   no joint swelling,   no edema  Lines: +PIV, RUE AV fistula , rectal tube  Skin: warm dry skin  Neurologic: awake and does follow with his eyes        WBC Count: 15.99 K/uL (10-25 @ 02:40)  WBC Count: 15.68 K/uL (10-24 @ 02:05)  WBC Count: 19.58 K/uL (10-23 @ 02:40)  WBC Count: 17.74 K/uL (10-22 @ 03:10)  WBC Count: 18.45 K/uL (10-21 @ 03:55)  WBC Count: 20.59 K/uL (10-20 @ 03:54)  WBC Count: 22.50 K/uL (10-19 @ 03:47)                            7.1    15.99 )-----------( 194      ( 25 Oct 2024 02:40 )             22.0       10-25    135  |  99  |  28[H]  ----------------------------<  173[H]  3.8   |  24  |  2.81[H]    Ca    8.7      25 Oct 2024 02:40  Phos  1.8     10-25  Mg     1.9     10-25    TPro  7.2  /  Alb  1.7[L]  /  TBili  0.4  /  DBili  x   /  AST  45[H]  /  ALT  45  /  AlkPhos  153[H]  10-25      Urinalysis Basic - ( 25 Oct 2024 02:40 )    Color: x / Appearance: x / SG: x / pH: x  Gluc: 173 mg/dL / Ketone: x  / Bili: x / Urobili: x   Blood: x / Protein: x / Nitrite: x   Leuk Esterase: x / RBC: x / WBC x   Sq Epi: x / Non Sq Epi: x / Bacteria: x    Creatinine Trend: 2.81<--, 4.22<--, 2.99<--, 5.81<--, 4.80<--, 3.55<--      MICROBIOLOGY:    .Blood BLOOD  10-18-24   No growth at 5 days  --  --      Bronchial  10-14-24   No acid-fast bacilli isolated at 1 week. ****Acid-fast cultures are held  for 6 weeks.****  --  Pseudomonas aeruginosa      Bronchial  10-08-24   Few Pseudomonas aeruginosa  Moderate Stenotrophomonas maltophilia  --  Pseudomonas aeruginosa  Stenotrophomonas maltophilia      .Stool  10-08-24   No enteric pathogens isolated.  (Stool culture examined for Salmonella,  Shigella, Campylobacter, Aeromonas, Plesiomonas,  Vibrio, E.coli O157 and Yersinia)  --  --      .Blood BLOOD  10-07-24   No growth at 5 days  --  --      .Blood BLOOD  10-07-24   No growth at 5 days  --  --      .Blood BLOOD  10-04-24   No growth at 5 days  --  --      .Blood BLOOD  10-04-24   No growth at 5 days  --  --        RADIOLOGY:  < from: Xray Chest 1 View- PORTABLE-Urgent (Xray Chest 1 View- PORTABLE-Urgent .) (10.23.24 @ 13:37) >  IMPRESSION:  Tracheostomy tube in place. LEFT retrocardiac airspace consolidation   and/or atelectasis.    ITALIA MICHELLE MD  This document has been electronically signed. Oct 24 2024  3:04PM

## 2024-10-25 NOTE — PROGRESS NOTE ADULT - ASSESSMENT
73 year old male with PMHx of HTN, IDDM2, ESRD on HD (M/W/F, through RUE AV fistula), hx of CVA x 2 (with residual R. sided weakness and dysphagia s/p PEG tube, previously with tracheostomy and now removed), and hx of RLE DVT (completed apixaban course) who presented to the ED on 10/4/24 for complaints of cough and chills. Admitted with 1) RLL PNA with course complicated by acute hypoxic respiratory failure ultimately requiring intubation, failed trial of extubation and was subsequently reintubated 10/14 2) Septic shock 2 to PNA, s/p tracheostomy 10/23.    Recc:  - s/p Trach 10/23/2024  - completed course of Levaquin for pseudomonas PNA  - observing off abx at present time  - repeat blood cx and sputum cx with fevers  - daily weaning trials as tolerates  - PEG feeds  - likely will need LTAC facility on discharge  - discussed with Dr. Overton

## 2024-10-25 NOTE — PROGRESS NOTE ADULT - SUBJECTIVE AND OBJECTIVE BOX
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 The patient was admitted w/ Septic shock POA due to RLL PNA with course complicated by acute hypoxic respiratory failure ultimately requiring admission to the ICU for intubation on 10/8. The pt failed trial of extubation and was subsequently reintubated on 10/14 & trached on 10/23. Pt was downgraded to the medical floors on 10/24. He is lying in bed in NAD.       MEDICATIONS  (STANDING):  albuterol/ipratropium for Nebulization 3 milliLiter(s) Nebulizer every 6 hours  chlorhexidine 0.12% Liquid 15 milliLiter(s) Oral Mucosa every 12 hours  chlorhexidine 2% Cloths 1 Application(s) Topical <User Schedule>  epoetin reilly-epbx (RETACRIT) Injectable 00195 Unit(s) IV Push <User Schedule>  ferrous    sulfate Liquid 300 milliGRAM(s) Enteral Tube daily  heparin   Injectable 5000 Unit(s) SubCutaneous every 12 hours  insulin lispro (ADMELOG) corrective regimen sliding scale   SubCutaneous every 6 hours  Nephro-noam 1 Tablet(s) Oral daily  pantoprazole   Suspension 40 milliGRAM(s) Enteral Tube daily  polyethylene glycol 3350 17 Gram(s) Oral daily  potassium phosphate / sodium phosphate Powder (PHOS-NaK) 1 Packet(s) Oral four times a day  senna 2 Tablet(s) Oral at bedtime  sodium chloride 3%  Inhalation 4 milliLiter(s) Inhalation every 6 hours    MEDICATIONS  (PRN):  acetaminophen   Oral Liquid .. 650 milliGRAM(s) Oral every 6 hours PRN Temp greater or equal to 38.5C (101.3F)  HYDROmorphone  Injectable 0.5 milliGRAM(s) IV Push every 4 hours PRN Severe Pain (7 - 10)      Allergies    No Known Allergies    Intolerances        Vital Signs Last 24 Hrs  T(C): 37.1 (25 Oct 2024 15:43), Max: 38.3 (25 Oct 2024 06:00)  T(F): 98.7 (25 Oct 2024 15:43), Max: 100.9 (25 Oct 2024 06:00)  HR: 93 (25 Oct 2024 19:25) (86 - 112)  BP: 128/64 (25 Oct 2024 19:00) (117/58 - 158/75)  BP(mean): 82 (25 Oct 2024 19:00) (75 - 98)  RR: 16 (25 Oct 2024 19:25) (15 - 30)  SpO2: 100% (25 Oct 2024 19:25) (85% - 100%)    Parameters below as of 25 Oct 2024 17:17  Patient On (Oxygen Delivery Method): ventilator        PHYSICAL EXAM:  GENERAL: NAD,   HEAD:  Atraumatic, Normocephalic   NECK: trached  NERVOUS SYSTEM: lethargic   CHEST/LUNG: bilateral vent sounds   HEART: Regular rate and rhythm; No murmurs, rubs, or gallops  ABDOMEN: Soft, Nontender, Nondistended; Bowel sounds present  EXTREMITIES:   No clubbing, cyanosis, or edema     LABS:                        7.1    15.99 )-----------( 194      ( 25 Oct 2024 02:40 )             22.0     10-25    135  |  99  |  28[H]  ----------------------------<  173[H]  3.8   |  24  |  2.81[H]    Ca    8.7      25 Oct 2024 02:40  Phos  1.8     10-25  Mg     1.9     10-25    TPro  7.2  /  Alb  1.7[L]  /  TBili  0.4  /  DBili  x   /  AST  45[H]  /  ALT  45  /  AlkPhos  153[H]  10-25      Urinalysis Basic - ( 25 Oct 2024 02:40 )    Color: x / Appearance: x / SG: x / pH: x  Gluc: 173 mg/dL / Ketone: x  / Bili: x / Urobili: x   Blood: x / Protein: x / Nitrite: x   Leuk Esterase: x / RBC: x / WBC x   Sq Epi: x / Non Sq Epi: x / Bacteria: x      CAPILLARY BLOOD GLUCOSE      POCT Blood Glucose.: 276 mg/dL (25 Oct 2024 17:50)  POCT Blood Glucose.: 394 mg/dL (25 Oct 2024 12:15)  POCT Blood Glucose.: 251 mg/dL (25 Oct 2024 05:36)  POCT Blood Glucose.: 303 mg/dL (24 Oct 2024 23:43)      RADIOLOGY & ADDITIONAL TESTS:    10-24-24 @ 07:01  -  10-25-24 @ 07:00  --------------------------------------------------------  IN:    Enteral Tube Flush: 80 mL    Nepro with Carb Steady: 710 mL  Total IN: 790 mL    OUT:    Other (mL): 1500 mL    Rectal Tube (mL): 90 mL  Total OUT: 1590 mL    Total NET: -800 mL      10-25-24 @ 07:01  -  10-25-24 @ 19:55  --------------------------------------------------------  IN:    Enteral Tube Flush: 150 mL    Nepro with Carb Steady: 330 mL  Total IN: 480 mL    OUT:    Other (mL): 15 mL  Total OUT: 15 mL    Total NET: 465 mL

## 2024-10-25 NOTE — PROGRESS NOTE ADULT - ASSESSMENT
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 The patient was admitted w/ Septic shock POA due to RLL PNA with course complicated by acute hypoxic respiratory failure ultimately requiring admission to the ICU for intubation on 10/8. The pt failed trial of extubation and was subsequently reintubated on 10/14 & trached on 10/23. Pt was downgraded to the medical floors on 10/24.      Fevers & leukocytosis in patient status post  Multifocal PNA treatment   - patient is status post course of zosyn and Levaquin for resistant pseudomonas PNA  - as per ID, will watch off antibiotics   - RVP negative  - follow up new blood and tracheostomy asp cultures    Resp failure status post tracheostomy  - c/w mechanical ventilation  - Pulmonary follow     - c/w Duoneb and hypertonic saline     Hypophosphatemia  - replace w/ PO Phos    Anemia  - c/w Epo and ferrous sulfate      Severe protein-calorie malnutrition and Underweight (BMI < 19)  - c/w PEG feeds     DM   - c/w ISS    Constipation  - c/w Senna and Miralax     ESRD  - c/w HD   - c/w Nephro-Kavon     Prophylaxis:  DVT: Heparin  GI: Protonix

## 2024-10-25 NOTE — PROVIDER CONTACT NOTE (EICU) - ACTION/TREATMENT ORDERED:
eICU vent Rounds: Vent setting updated in EMR to reflect current vent setting as seen via camera.
eICU vent Rounds: Vent setting updated in EMR to reflect current vent setting as seen via camera.
vent orders updated to reflect current vent settings noted during eICU vent rounds

## 2024-10-26 LAB
ALBUMIN SERPL ELPH-MCNC: 1.6 G/DL — LOW (ref 3.3–5)
ALP SERPL-CCNC: 177 U/L — HIGH (ref 40–120)
ALT FLD-CCNC: 26 U/L — SIGNIFICANT CHANGE UP (ref 12–78)
ANION GAP SERPL CALC-SCNC: 15 MMOL/L — SIGNIFICANT CHANGE UP (ref 5–17)
AST SERPL-CCNC: 46 U/L — HIGH (ref 15–37)
BASOPHILS # BLD AUTO: 0.04 K/UL — SIGNIFICANT CHANGE UP (ref 0–0.2)
BASOPHILS NFR BLD AUTO: 0.3 % — SIGNIFICANT CHANGE UP (ref 0–2)
BILIRUB SERPL-MCNC: 0.4 MG/DL — SIGNIFICANT CHANGE UP (ref 0.2–1.2)
BLD GP AB SCN SERPL QL: SIGNIFICANT CHANGE UP
BUN SERPL-MCNC: 43 MG/DL — HIGH (ref 7–23)
CALCIUM SERPL-MCNC: 8.7 MG/DL — SIGNIFICANT CHANGE UP (ref 8.5–10.1)
CHLORIDE SERPL-SCNC: 97 MMOL/L — SIGNIFICANT CHANGE UP (ref 96–108)
CO2 SERPL-SCNC: 23 MMOL/L — SIGNIFICANT CHANGE UP (ref 22–31)
CREAT SERPL-MCNC: 4.04 MG/DL — HIGH (ref 0.5–1.3)
EGFR: 15 ML/MIN/1.73M2 — LOW
EOSINOPHIL # BLD AUTO: 0.13 K/UL — SIGNIFICANT CHANGE UP (ref 0–0.5)
EOSINOPHIL NFR BLD AUTO: 0.9 % — SIGNIFICANT CHANGE UP (ref 0–6)
GLUCOSE BLDC GLUCOMTR-MCNC: 190 MG/DL — HIGH (ref 70–99)
GLUCOSE BLDC GLUCOMTR-MCNC: 287 MG/DL — HIGH (ref 70–99)
GLUCOSE BLDC GLUCOMTR-MCNC: 302 MG/DL — HIGH (ref 70–99)
GLUCOSE BLDC GLUCOMTR-MCNC: 359 MG/DL — HIGH (ref 70–99)
GLUCOSE SERPL-MCNC: 215 MG/DL — HIGH (ref 70–99)
GRAM STN FLD: ABNORMAL
HCT VFR BLD CALC: 20.7 % — CRITICAL LOW (ref 39–50)
HCT VFR BLD CALC: 21.1 % — LOW (ref 39–50)
HGB BLD-MCNC: 6.7 G/DL — CRITICAL LOW (ref 13–17)
HGB BLD-MCNC: 7 G/DL — CRITICAL LOW (ref 13–17)
IMM GRANULOCYTES NFR BLD AUTO: 0.7 % — SIGNIFICANT CHANGE UP (ref 0–0.9)
LYMPHOCYTES # BLD AUTO: 0.89 K/UL — LOW (ref 1–3.3)
LYMPHOCYTES # BLD AUTO: 6 % — LOW (ref 13–44)
MAGNESIUM SERPL-MCNC: 2.2 MG/DL — SIGNIFICANT CHANGE UP (ref 1.6–2.6)
MCHC RBC-ENTMCNC: 23.5 PG — LOW (ref 27–34)
MCHC RBC-ENTMCNC: 24.2 PG — LOW (ref 27–34)
MCHC RBC-ENTMCNC: 32.4 G/DL — SIGNIFICANT CHANGE UP (ref 32–36)
MCHC RBC-ENTMCNC: 33.2 G/DL — SIGNIFICANT CHANGE UP (ref 32–36)
MCV RBC AUTO: 72.6 FL — LOW (ref 80–100)
MCV RBC AUTO: 73 FL — LOW (ref 80–100)
MONOCYTES # BLD AUTO: 0.61 K/UL — SIGNIFICANT CHANGE UP (ref 0–0.9)
MONOCYTES NFR BLD AUTO: 4.1 % — SIGNIFICANT CHANGE UP (ref 2–14)
NEUTROPHILS # BLD AUTO: 13.12 K/UL — HIGH (ref 1.8–7.4)
NEUTROPHILS NFR BLD AUTO: 88 % — HIGH (ref 43–77)
NRBC # BLD: 0 /100 WBCS — SIGNIFICANT CHANGE UP (ref 0–0)
NRBC # BLD: 0 /100 WBCS — SIGNIFICANT CHANGE UP (ref 0–0)
PHOSPHATE SERPL-MCNC: 4.9 MG/DL — HIGH (ref 2.5–4.5)
PLATELET # BLD AUTO: 179 K/UL — SIGNIFICANT CHANGE UP (ref 150–400)
PLATELET # BLD AUTO: 202 K/UL — SIGNIFICANT CHANGE UP (ref 150–400)
POTASSIUM SERPL-MCNC: 3.8 MMOL/L — SIGNIFICANT CHANGE UP (ref 3.5–5.3)
POTASSIUM SERPL-SCNC: 3.8 MMOL/L — SIGNIFICANT CHANGE UP (ref 3.5–5.3)
PROT SERPL-MCNC: 6.8 GM/DL — SIGNIFICANT CHANGE UP (ref 6–8.3)
RBC # BLD: 2.85 M/UL — LOW (ref 4.2–5.8)
RBC # BLD: 2.89 M/UL — LOW (ref 4.2–5.8)
RBC # FLD: 23.9 % — HIGH (ref 10.3–14.5)
RBC # FLD: 23.9 % — HIGH (ref 10.3–14.5)
SODIUM SERPL-SCNC: 135 MMOL/L — SIGNIFICANT CHANGE UP (ref 135–145)
SPECIMEN SOURCE: SIGNIFICANT CHANGE UP
WBC # BLD: 14.27 K/UL — HIGH (ref 3.8–10.5)
WBC # BLD: 14.89 K/UL — HIGH (ref 3.8–10.5)
WBC # FLD AUTO: 14.27 K/UL — HIGH (ref 3.8–10.5)
WBC # FLD AUTO: 14.89 K/UL — HIGH (ref 3.8–10.5)

## 2024-10-26 PROCEDURE — 99232 SBSQ HOSP IP/OBS MODERATE 35: CPT

## 2024-10-26 RX ORDER — NYSTATIN 100000 U/G
1 POWDER TOPICAL EVERY 8 HOURS
Refills: 0 | Status: DISCONTINUED | OUTPATIENT
Start: 2024-10-26 | End: 2024-11-07

## 2024-10-26 RX ADMIN — IPRATROPIUM BROMIDE AND ALBUTEROL SULFATE 3 MILLILITER(S): .5; 2.5 SOLUTION RESPIRATORY (INHALATION) at 00:13

## 2024-10-26 RX ADMIN — HEPARIN SODIUM 5000 UNIT(S): 10000 INJECTION INTRAVENOUS; SUBCUTANEOUS at 18:09

## 2024-10-26 RX ADMIN — SODIUM CHLORIDE 4 MILLILITER(S): 9 INJECTION, SOLUTION INTRAMUSCULAR; INTRAVENOUS; SUBCUTANEOUS at 11:51

## 2024-10-26 RX ADMIN — PANTOPRAZOLE SODIUM 40 MILLIGRAM(S): 40 TABLET, DELAYED RELEASE ORAL at 12:31

## 2024-10-26 RX ADMIN — CHLORHEXIDINE GLUCONATE 1 APPLICATION(S): 40 SOLUTION TOPICAL at 05:21

## 2024-10-26 RX ADMIN — DIBASIC SODIUM PHOSPHATE, MONOBASIC POTASSIUM PHOSPHATE AND MONOBASIC SODIUM PHOSPHATE 1 PACKET(S): 852; 155; 130 TABLET ORAL at 05:21

## 2024-10-26 RX ADMIN — NYSTATIN 1 APPLICATION(S): 100000 POWDER TOPICAL at 14:30

## 2024-10-26 RX ADMIN — Medication 10: at 23:18

## 2024-10-26 RX ADMIN — Medication 300 MILLIGRAM(S): at 12:34

## 2024-10-26 RX ADMIN — CHLORHEXIDINE GLUCONATE 15 MILLILITER(S): 40 SOLUTION TOPICAL at 05:21

## 2024-10-26 RX ADMIN — IPRATROPIUM BROMIDE AND ALBUTEROL SULFATE 3 MILLILITER(S): .5; 2.5 SOLUTION RESPIRATORY (INHALATION) at 05:10

## 2024-10-26 RX ADMIN — SODIUM CHLORIDE 4 MILLILITER(S): 9 INJECTION, SOLUTION INTRAMUSCULAR; INTRAVENOUS; SUBCUTANEOUS at 00:12

## 2024-10-26 RX ADMIN — IPRATROPIUM BROMIDE AND ALBUTEROL SULFATE 3 MILLILITER(S): .5; 2.5 SOLUTION RESPIRATORY (INHALATION) at 11:51

## 2024-10-26 RX ADMIN — SODIUM CHLORIDE 4 MILLILITER(S): 9 INJECTION, SOLUTION INTRAMUSCULAR; INTRAVENOUS; SUBCUTANEOUS at 17:27

## 2024-10-26 RX ADMIN — CHLORHEXIDINE GLUCONATE 15 MILLILITER(S): 40 SOLUTION TOPICAL at 18:06

## 2024-10-26 RX ADMIN — ERYTHROPOIETIN 10000 UNIT(S): 10000 INJECTION, SOLUTION INTRAVENOUS; SUBCUTANEOUS at 16:04

## 2024-10-26 RX ADMIN — Medication 1 TABLET(S): at 12:34

## 2024-10-26 RX ADMIN — Medication 6: at 05:39

## 2024-10-26 RX ADMIN — SODIUM CHLORIDE 4 MILLILITER(S): 9 INJECTION, SOLUTION INTRAMUSCULAR; INTRAVENOUS; SUBCUTANEOUS at 05:09

## 2024-10-26 RX ADMIN — IPRATROPIUM BROMIDE AND ALBUTEROL SULFATE 3 MILLILITER(S): .5; 2.5 SOLUTION RESPIRATORY (INHALATION) at 17:27

## 2024-10-26 RX ADMIN — Medication 8: at 12:32

## 2024-10-26 RX ADMIN — Medication 2: at 18:06

## 2024-10-26 RX ADMIN — HEPARIN SODIUM 5000 UNIT(S): 10000 INJECTION INTRAVENOUS; SUBCUTANEOUS at 05:21

## 2024-10-26 RX ADMIN — NYSTATIN 1 APPLICATION(S): 100000 POWDER TOPICAL at 21:45

## 2024-10-26 RX ADMIN — DIBASIC SODIUM PHOSPHATE, MONOBASIC POTASSIUM PHOSPHATE AND MONOBASIC SODIUM PHOSPHATE 1 PACKET(S): 852; 155; 130 TABLET ORAL at 23:41

## 2024-10-26 NOTE — CHART NOTE - NSCHARTNOTEFT_GEN_A_CORE
Asked to have meeting with family tho address concerns/ plan.  In attendance Ashley ,  , myself, the patients wife, 2 daughters and son.    The family was updated on patients condition, including labs.   It was explained that the patient has been down graded and although physically remains in ICU, is now under the care of the medical service.   We explained that the next steps will likely be moving towards a discharge plan, and that the family will likely be contacted by case management with a list of facilities to choose from fro discharge placement .     We discussed that the patient does have the start of skin breakdown on the sacrum, mainly form excoriation .   Patient recently has had diarrhea, and now has FIC in place.   The patient was evaluated by PT/wound care team on 10/22 and recommendations for care are in place.   A hill [facility] specialty bed is ordered to help prevent any further skin breakdown.     The diarrhea was also addressed,. The patients daughter raised the concern for C. diff.   the stool in appearance is not watery, the patient remains afebrile, and only a slight elevation in WBC.   The patient also has been receiving Miralax/ senna daily.   Laxatives/ stool softeners discontinued.  If the patient continues to have diarrhea will send for culture.     The family was appreciative of the update and ask that they remain informed o na daily basis.

## 2024-10-26 NOTE — PROVIDER CONTACT NOTE (OTHER) - SITUATION
Pt due to receive Phos-Na-K powder at midnight. Last labs from 10/26 2AM Na 135, K 3.8, and P 4.9. Pt received dialysis today. Per provider, pt to receive medication

## 2024-10-26 NOTE — PROVIDER CONTACT NOTE (CRITICAL VALUE NOTIFICATION) - TEST AND RESULT REPORTED:
hemoglobin 5.9 hematocrit 17.7
H&H 5.9 and 17.9
Hgb 6.7 Hct 20.7%
Hemoglobin 7.0
Lactate 3.6
Blood Glucose (Fingerstick) 448
Phos 0.8

## 2024-10-26 NOTE — PROGRESS NOTE ADULT - ASSESSMENT
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 The patient was admitted w/ Septic shock POA due to RLL PNA with course complicated by acute hypoxic respiratory failure ultimately requiring admission to the ICU for intubation on 10/8. The pt failed trial of extubation and was subsequently reintubated on 10/14 & trached on 10/23. Pt was downgraded to the medical floors on 10/24.      Fevers & leukocytosis in patient status post  Multifocal PNA treatment   - patient is status post course of zosyn and Levaquin for resistant pseudomonas PNA  - as per ID, will watch off antibiotics   - RVP negative  - follow up new blood and tracheostomy asp cultures    Resp failure status post tracheostomy  - c/w mechanical ventilation  - Pulmonary follow     - c/w Duoneb and hypertonic saline     Hypophosphatemia  - replace w/ PO Phos    Anemia  - c/w Epo and ferrous sulfate   - status post 1 unit pRBC 10/26     Severe protein-calorie malnutrition and Underweight (BMI < 19)  - c/w PEG feeds     DM   - c/w ISS    Constipation  - c/w Senna and Miralax stopped due to Diarrhea     ESRD  - c/w HD   - c/w Nephro-Kavon     Prophylaxis:  DVT: Heparin  GI: Protonix

## 2024-10-26 NOTE — PROVIDER CONTACT NOTE (CRITICAL VALUE NOTIFICATION) - NAME OF MD/NP/PA/DO NOTIFIED:
ROBBIN Purdy
ROBBIN Cuellar
Dr. Michaels
ROBBIN Purdy
ROBBIN Cuellar
ROBBIN Ramírez is notified.
sharmila amaral

## 2024-10-26 NOTE — PROGRESS NOTE ADULT - SUBJECTIVE AND OBJECTIVE BOX
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 The patient was admitted w/ Septic shock POA due to RLL PNA with course complicated by acute hypoxic respiratory failure ultimately requiring admission to the ICU for intubation on 10/8. The pt failed trial of extubation and was subsequently reintubated on 10/14 & trached on 10/23. Pt was downgraded to the medical floors on 10/24. He is lying in bed in NAD.      MEDICATIONS  (STANDING):  albuterol/ipratropium for Nebulization 3 milliLiter(s) Nebulizer every 6 hours  chlorhexidine 0.12% Liquid 15 milliLiter(s) Oral Mucosa every 12 hours  chlorhexidine 2% Cloths 1 Application(s) Topical <User Schedule>  epoetin reilly-epbx (RETACRIT) Injectable 46608 Unit(s) IV Push <User Schedule>  ferrous    sulfate Liquid 300 milliGRAM(s) Enteral Tube daily  heparin   Injectable 5000 Unit(s) SubCutaneous every 12 hours  insulin lispro (ADMELOG) corrective regimen sliding scale   SubCutaneous every 6 hours  Nephro-noam 1 Tablet(s) Oral daily  nystatin Powder 1 Application(s) Topical every 8 hours  pantoprazole   Suspension 40 milliGRAM(s) Enteral Tube daily  potassium phosphate / sodium phosphate Powder (PHOS-NaK) 1 Packet(s) Oral four times a day  sodium chloride 3%  Inhalation 4 milliLiter(s) Inhalation every 6 hours    MEDICATIONS  (PRN):  acetaminophen   Oral Liquid .. 650 milliGRAM(s) Oral every 6 hours PRN Temp greater or equal to 38.5C (101.3F)  HYDROmorphone  Injectable 0.5 milliGRAM(s) IV Push every 4 hours PRN Severe Pain (7 - 10)      Allergies    No Known Allergies    Intolerances        Vital Signs Last 24 Hrs  T(C): 36.5 (26 Oct 2024 16:45), Max: 37.7 (25 Oct 2024 23:26)  T(F): 97.7 (26 Oct 2024 16:45), Max: 99.9 (25 Oct 2024 23:26)  HR: 95 (26 Oct 2024 19:00) (83 - 114)  BP: 138/71 (26 Oct 2024 18:00) (107/74 - 161/78)  BP(mean): 89 (26 Oct 2024 18:00) (80 - 102)  RR: 16 (26 Oct 2024 19:00) (16 - 23)  SpO2: 100% (26 Oct 2024 19:00) (91% - 100%)    Parameters below as of 26 Oct 2024 17:27  Patient On (Oxygen Delivery Method): ventilator     PHYSICAL EXAM:  GENERAL: NAD,   HEAD: Atraumatic, Normocephalic   NECK: trached  NERVOUS SYSTEM: lethargic   CHEST/LUNG: bilateral vent sounds   HEART: Regular rate and rhythm; No murmurs, rubs, or gallops  ABDOMEN: Soft, Nontender, Nondistended; Bowel sounds present  EXTREMITIES: No clubbing, cyanosis, or edema    LABS:                        6.7    14.27 )-----------( 202      ( 26 Oct 2024 04:27 )             20.7     10-26    135  |  97  |  43[H]  ----------------------------<  215[H]  3.8   |  23  |  4.04[H]    Ca    8.7      26 Oct 2024 02:15  Phos  4.9     10-26  Mg     2.2     10-26    TPro  6.8  /  Alb  1.6[L]  /  TBili  0.4  /  DBili  x   /  AST  46[H]  /  ALT  26  /  AlkPhos  177[H]  10-26      Urinalysis Basic - ( 26 Oct 2024 02:15 )    Color: x / Appearance: x / SG: x / pH: x  Gluc: 215 mg/dL / Ketone: x  / Bili: x / Urobili: x   Blood: x / Protein: x / Nitrite: x   Leuk Esterase: x / RBC: x / WBC x   Sq Epi: x / Non Sq Epi: x / Bacteria: x      CAPILLARY BLOOD GLUCOSE      POCT Blood Glucose.: 190 mg/dL (26 Oct 2024 17:49)  POCT Blood Glucose.: 302 mg/dL (26 Oct 2024 12:05)  POCT Blood Glucose.: 287 mg/dL (26 Oct 2024 05:37)  POCT Blood Glucose.: 307 mg/dL (25 Oct 2024 23:44)      Culture - Acid Fast - Blood (collected 25 Oct 2024 18:35)  Source: .Blood Blood  Preliminary Report (25 Oct 2024 22:43):    Culture is being performed.    Culture - Sputum (collected 25 Oct 2024 17:30)  Source: Trach Asp Tracheal Aspirate  Gram Stain (prelim) (26 Oct 2024 18:23):    Few polymorphonuclear leukocytes per low power field    Rare Squamous epithelial cells per low power field    Few Gram Negative Rods per oil power field  Preliminary Report (26 Oct 2024 18:23):    Moderate Pseudomonas aeruginosa      RADIOLOGY & ADDITIONAL TESTS:    10-25-24 @ 07:01  -  10-26-24 @ 07:00  --------------------------------------------------------  IN:    Enteral Tube Flush: 300 mL    Nepro with Carb Steady: 880 mL    PRBCs (Packed Red Blood Cells): 323 mL  Total IN: 1503 mL    OUT:    Other (mL): 15 mL    Rectal Tube (mL): 75 mL  Total OUT: 90 mL    Total NET: 1413 mL      10-26-24 @ 07:01  -  10-26-24 @ 20:39  --------------------------------------------------------  IN:    Nepro with Carb Steady: 300 mL  Total IN: 300 mL    OUT:    Other (mL): 1500 mL    Rectal Tube (mL): 100 mL  Total OUT: 1600 mL    Total NET: -1300 mL

## 2024-10-27 LAB
-  AZTREONAM: SIGNIFICANT CHANGE UP
-  CEFEPIME: SIGNIFICANT CHANGE UP
-  CEFTAZIDIME/AVIBACTAM: SIGNIFICANT CHANGE UP
-  CEFTAZIDIME: SIGNIFICANT CHANGE UP
-  CEFTOLOZANE/TAZOBACTAM: SIGNIFICANT CHANGE UP
-  CIPROFLOXACIN: SIGNIFICANT CHANGE UP
-  IMIPENEM: SIGNIFICANT CHANGE UP
-  LEVOFLOXACIN: SIGNIFICANT CHANGE UP
-  MEROPENEM: SIGNIFICANT CHANGE UP
-  PIPERACILLIN/TAZOBACTAM: SIGNIFICANT CHANGE UP
ALBUMIN SERPL ELPH-MCNC: 1.7 G/DL — LOW (ref 3.3–5)
ALP SERPL-CCNC: 197 U/L — HIGH (ref 40–120)
ALT FLD-CCNC: 18 U/L — SIGNIFICANT CHANGE UP (ref 12–78)
ANION GAP SERPL CALC-SCNC: 11 MMOL/L — SIGNIFICANT CHANGE UP (ref 5–17)
AST SERPL-CCNC: 37 U/L — SIGNIFICANT CHANGE UP (ref 15–37)
BILIRUB SERPL-MCNC: 0.4 MG/DL — SIGNIFICANT CHANGE UP (ref 0.2–1.2)
BLANDM BLD POS QL PROBE: SIGNIFICANT CHANGE UP
BUN SERPL-MCNC: 36 MG/DL — HIGH (ref 7–23)
CALCIUM SERPL-MCNC: 9.1 MG/DL — SIGNIFICANT CHANGE UP (ref 8.5–10.1)
CHLORIDE SERPL-SCNC: 99 MMOL/L — SIGNIFICANT CHANGE UP (ref 96–108)
CO2 SERPL-SCNC: 27 MMOL/L — SIGNIFICANT CHANGE UP (ref 22–31)
CREAT SERPL-MCNC: 3 MG/DL — HIGH (ref 0.5–1.3)
CULTURE RESULTS: ABNORMAL
EGFR: 22 ML/MIN/1.73M2 — LOW
GLUCOSE BLDC GLUCOMTR-MCNC: 233 MG/DL — HIGH (ref 70–99)
GLUCOSE BLDC GLUCOMTR-MCNC: 283 MG/DL — HIGH (ref 70–99)
GLUCOSE BLDC GLUCOMTR-MCNC: 324 MG/DL — HIGH (ref 70–99)
GLUCOSE BLDC GLUCOMTR-MCNC: 361 MG/DL — HIGH (ref 70–99)
GLUCOSE SERPL-MCNC: 239 MG/DL — HIGH (ref 70–99)
GRAM STN FLD: ABNORMAL
HCT VFR BLD CALC: 26.3 % — LOW (ref 39–50)
HGB BLD-MCNC: 8.6 G/DL — LOW (ref 13–17)
MAGNESIUM SERPL-MCNC: 2.1 MG/DL — SIGNIFICANT CHANGE UP (ref 1.6–2.6)
MCHC RBC-ENTMCNC: 24.7 PG — LOW (ref 27–34)
MCHC RBC-ENTMCNC: 32.7 G/DL — SIGNIFICANT CHANGE UP (ref 32–36)
MCV RBC AUTO: 75.6 FL — LOW (ref 80–100)
METHOD TYPE: SIGNIFICANT CHANGE UP
METHOD TYPE: SIGNIFICANT CHANGE UP
NRBC # BLD: 0 /100 WBCS — SIGNIFICANT CHANGE UP (ref 0–0)
ORGANISM # SPEC MICROSCOPIC CNT: ABNORMAL
ORGANISM # SPEC MICROSCOPIC CNT: ABNORMAL
ORGANISM # SPEC MICROSCOPIC CNT: SIGNIFICANT CHANGE UP
PHOSPHATE SERPL-MCNC: 4 MG/DL — SIGNIFICANT CHANGE UP (ref 2.5–4.5)
PLATELET # BLD AUTO: 214 K/UL — SIGNIFICANT CHANGE UP (ref 150–400)
POTASSIUM SERPL-MCNC: 4 MMOL/L — SIGNIFICANT CHANGE UP (ref 3.5–5.3)
POTASSIUM SERPL-SCNC: 4 MMOL/L — SIGNIFICANT CHANGE UP (ref 3.5–5.3)
PROT SERPL-MCNC: 7.2 GM/DL — SIGNIFICANT CHANGE UP (ref 6–8.3)
RBC # BLD: 3.48 M/UL — LOW (ref 4.2–5.8)
RBC # FLD: 23.3 % — HIGH (ref 10.3–14.5)
SODIUM SERPL-SCNC: 137 MMOL/L — SIGNIFICANT CHANGE UP (ref 135–145)
SPECIMEN SOURCE: SIGNIFICANT CHANGE UP
WBC # BLD: 12.19 K/UL — HIGH (ref 3.8–10.5)
WBC # FLD AUTO: 12.19 K/UL — HIGH (ref 3.8–10.5)

## 2024-10-27 PROCEDURE — 99232 SBSQ HOSP IP/OBS MODERATE 35: CPT

## 2024-10-27 RX ORDER — GLUCAGON INJECTION, SOLUTION 1 MG/.2ML
1 INJECTION, SOLUTION SUBCUTANEOUS ONCE
Refills: 0 | Status: DISCONTINUED | OUTPATIENT
Start: 2024-10-27 | End: 2024-11-07

## 2024-10-27 RX ORDER — INSULIN GLARGINE,HUM.REC.ANLOG 100/ML
15 VIAL (ML) SUBCUTANEOUS AT BEDTIME
Refills: 0 | Status: DISCONTINUED | OUTPATIENT
Start: 2024-10-27 | End: 2024-11-01

## 2024-10-27 RX ADMIN — SODIUM CHLORIDE 4 MILLILITER(S): 9 INJECTION, SOLUTION INTRAMUSCULAR; INTRAVENOUS; SUBCUTANEOUS at 11:30

## 2024-10-27 RX ADMIN — Medication 10: at 11:13

## 2024-10-27 RX ADMIN — Medication 8: at 23:26

## 2024-10-27 RX ADMIN — Medication 4: at 17:26

## 2024-10-27 RX ADMIN — SODIUM CHLORIDE 4 MILLILITER(S): 9 INJECTION, SOLUTION INTRAMUSCULAR; INTRAVENOUS; SUBCUTANEOUS at 00:03

## 2024-10-27 RX ADMIN — NYSTATIN 1 APPLICATION(S): 100000 POWDER TOPICAL at 05:19

## 2024-10-27 RX ADMIN — SODIUM CHLORIDE 4 MILLILITER(S): 9 INJECTION, SOLUTION INTRAMUSCULAR; INTRAVENOUS; SUBCUTANEOUS at 23:12

## 2024-10-27 RX ADMIN — SODIUM CHLORIDE 4 MILLILITER(S): 9 INJECTION, SOLUTION INTRAMUSCULAR; INTRAVENOUS; SUBCUTANEOUS at 17:40

## 2024-10-27 RX ADMIN — SODIUM CHLORIDE 4 MILLILITER(S): 9 INJECTION, SOLUTION INTRAMUSCULAR; INTRAVENOUS; SUBCUTANEOUS at 05:32

## 2024-10-27 RX ADMIN — NYSTATIN 1 APPLICATION(S): 100000 POWDER TOPICAL at 21:50

## 2024-10-27 RX ADMIN — Medication 6: at 05:19

## 2024-10-27 RX ADMIN — IPRATROPIUM BROMIDE AND ALBUTEROL SULFATE 3 MILLILITER(S): .5; 2.5 SOLUTION RESPIRATORY (INHALATION) at 00:03

## 2024-10-27 RX ADMIN — IPRATROPIUM BROMIDE AND ALBUTEROL SULFATE 3 MILLILITER(S): .5; 2.5 SOLUTION RESPIRATORY (INHALATION) at 23:12

## 2024-10-27 RX ADMIN — HEPARIN SODIUM 5000 UNIT(S): 10000 INJECTION INTRAVENOUS; SUBCUTANEOUS at 05:17

## 2024-10-27 RX ADMIN — IPRATROPIUM BROMIDE AND ALBUTEROL SULFATE 3 MILLILITER(S): .5; 2.5 SOLUTION RESPIRATORY (INHALATION) at 17:40

## 2024-10-27 RX ADMIN — CHLORHEXIDINE GLUCONATE 15 MILLILITER(S): 40 SOLUTION TOPICAL at 05:17

## 2024-10-27 RX ADMIN — Medication 1 TABLET(S): at 11:14

## 2024-10-27 RX ADMIN — CHLORHEXIDINE GLUCONATE 1 APPLICATION(S): 40 SOLUTION TOPICAL at 05:19

## 2024-10-27 RX ADMIN — CHLORHEXIDINE GLUCONATE 15 MILLILITER(S): 40 SOLUTION TOPICAL at 17:26

## 2024-10-27 RX ADMIN — PANTOPRAZOLE SODIUM 40 MILLIGRAM(S): 40 TABLET, DELAYED RELEASE ORAL at 11:13

## 2024-10-27 RX ADMIN — IPRATROPIUM BROMIDE AND ALBUTEROL SULFATE 3 MILLILITER(S): .5; 2.5 SOLUTION RESPIRATORY (INHALATION) at 05:32

## 2024-10-27 RX ADMIN — HEPARIN SODIUM 5000 UNIT(S): 10000 INJECTION INTRAVENOUS; SUBCUTANEOUS at 17:26

## 2024-10-27 RX ADMIN — IPRATROPIUM BROMIDE AND ALBUTEROL SULFATE 3 MILLILITER(S): .5; 2.5 SOLUTION RESPIRATORY (INHALATION) at 11:30

## 2024-10-27 RX ADMIN — NYSTATIN 1 APPLICATION(S): 100000 POWDER TOPICAL at 17:26

## 2024-10-27 RX ADMIN — Medication 300 MILLIGRAM(S): at 11:13

## 2024-10-27 NOTE — PROGRESS NOTE ADULT - SUBJECTIVE AND OBJECTIVE BOX
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 The patient was admitted w/ Septic shock POA due to RLL PNA with course complicated by acute hypoxic respiratory failure ultimately requiring admission to the ICU for intubation on 10/8. The pt failed trial of extubation and was subsequently reintubated on 10/14 & trached on 10/23. Pt was downgraded to the medical floors on 10/24. He is lying in bed in NAD.      MEDICATIONS  (STANDING):  albuterol/ipratropium for Nebulization 3 milliLiter(s) Nebulizer every 6 hours  chlorhexidine 0.12% Liquid 15 milliLiter(s) Oral Mucosa every 12 hours  chlorhexidine 2% Cloths 1 Application(s) Topical <User Schedule>  epoetin reilly-epbx (RETACRIT) Injectable 86600 Unit(s) IV Push <User Schedule>  ferrous    sulfate Liquid 300 milliGRAM(s) Enteral Tube daily  heparin   Injectable 5000 Unit(s) SubCutaneous every 12 hours  insulin lispro (ADMELOG) corrective regimen sliding scale   SubCutaneous every 6 hours  Nephro-noam 1 Tablet(s) Oral daily  nystatin Powder 1 Application(s) Topical every 8 hours  pantoprazole   Suspension 40 milliGRAM(s) Enteral Tube daily  sodium chloride 3%  Inhalation 4 milliLiter(s) Inhalation every 6 hours    MEDICATIONS  (PRN):  acetaminophen   Oral Liquid .. 650 milliGRAM(s) Oral every 6 hours PRN Temp greater or equal to 38.5C (101.3F)  HYDROmorphone  Injectable 0.5 milliGRAM(s) IV Push every 4 hours PRN Severe Pain (7 - 10)      Allergies    No Known Allergies    Intolerances        Vital Signs Last 24 Hrs  T(C): 37.3 (27 Oct 2024 19:30), Max: 37.7 (27 Oct 2024 12:00)  T(F): 99.1 (27 Oct 2024 19:30), Max: 99.9 (27 Oct 2024 16:00)  HR: 94 (27 Oct 2024 20:00) (79 - 105)  BP: 148/70 (27 Oct 2024 20:00) (120/57 - 154/73)  BP(mean): 91 (27 Oct 2024 20:00) (75 - 95)  RR: 17 (27 Oct 2024 20:00) (14 - 33)  SpO2: 100% (27 Oct 2024 20:00) (87% - 100%)    Parameters below as of 27 Oct 2024 19:30  Patient On (Oxygen Delivery Method): ventilator, /Rate16/PEEP5    O2 Concentration (%): 40    PHYSICAL EXAM:  GENERAL: NAD,   HEAD: Atraumatic, Normocephalic   NECK: trached  NERVOUS SYSTEM: lethargic   CHEST/LUNG: bilateral vent sounds   HEART: Regular rate and rhythm; No murmurs, rubs, or gallops  ABDOMEN: Soft, Nontender, Nondistended; Bowel sounds present  EXTREMITIES: No clubbing, cyanosis, or edema    LABS:                        8.6    12.19 )-----------( 214      ( 27 Oct 2024 02:29 )             26.3     10-27    137  |  99  |  36[H]  ----------------------------<  239[H]  4.0   |  27  |  3.00[H]    Ca    9.1      27 Oct 2024 02:29  Phos  4.0     10-27  Mg     2.1     10-27    TPro  7.2  /  Alb  1.7[L]  /  TBili  0.4  /  DBili  x   /  AST  37  /  ALT  18  /  AlkPhos  197[H]  10-27      Urinalysis Basic - ( 27 Oct 2024 02:29 )    Color: x / Appearance: x / SG: x / pH: x  Gluc: 239 mg/dL / Ketone: x  / Bili: x / Urobili: x   Blood: x / Protein: x / Nitrite: x   Leuk Esterase: x / RBC: x / WBC x   Sq Epi: x / Non Sq Epi: x / Bacteria: x      CAPILLARY BLOOD GLUCOSE      POCT Blood Glucose.: 233 mg/dL (27 Oct 2024 17:10)  POCT Blood Glucose.: 361 mg/dL (27 Oct 2024 11:09)  POCT Blood Glucose.: 283 mg/dL (27 Oct 2024 05:14)  POCT Blood Glucose.: 359 mg/dL (26 Oct 2024 23:14)      Culture - Acid Fast - Blood (collected 25 Oct 2024 18:35)  Source: .Blood Blood  Preliminary Report (25 Oct 2024 22:43):    Culture is being performed.    Culture - Sputum (collected 25 Oct 2024 17:30)  Source: Trach Asp Tracheal Aspirate  Gram Stain (27 Oct 2024 17:35):    Few polymorphonuclear leukocytes per low power field    Rare Squamous epithelial cells per low power field    Few Gram Negative Rods per oil power field  Final Report (27 Oct 2024 17:35):    Moderate Pseudomonas aeruginosa (Carbapenem Resistant)    Commensal dominick consistent with body site  Organism: Pseudomonas aeruginosa (Carbapenem Resistant) (27 Oct 2024 17:35)  Organism: Pseudomonas aeruginosa (Carbapenem Resistant) (27 Oct 2024 17:35)  Organism: Pseudomonas aeruginosa (Carbapenem Resistant) (27 Oct 2024 17:35)    Culture - Blood (collected 25 Oct 2024 16:55)  Source: .Blood BLOOD  Preliminary Report (26 Oct 2024 23:07):    No growth at 24 hours    Culture - Blood (collected 25 Oct 2024 16:55)  Source: .Blood BLOOD  Preliminary Report (26 Oct 2024 23:07):    No growth at 24 hours      RADIOLOGY & ADDITIONAL TESTS:    10-26-24 @ 07:01  -  10-27-24 @ 07:00  --------------------------------------------------------  IN:    Enteral Tube Flush: 80 mL    Nepro with Carb Steady: 900 mL  Total IN: 980 mL    OUT:    Other (mL): 1500 mL    Rectal Tube (mL): 300 mL  Total OUT: 1800 mL    Total NET: -820 mL      10-27-24 @ 07:01  -  10-27-24 @ 21:42  --------------------------------------------------------  IN:    Nepro with Carb Steady: 450 mL    Oral Fluid: 50 mL  Total IN: 500 mL    OUT:    Rectal Tube (mL): 100 mL  Total OUT: 100 mL    Total NET: 400 mL

## 2024-10-27 NOTE — PROGRESS NOTE ADULT - ASSESSMENT
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 The patient was admitted w/ Septic shock POA due to RLL PNA with course complicated by acute hypoxic respiratory failure ultimately requiring admission to the ICU for intubation on 10/8. The pt failed trial of extubation and was subsequently reintubated on 10/14 & trached on 10/23. Pt was downgraded to the medical floors on 10/24.      Fevers & leukocytosis in patient status post  Multifocal PNA treatment   - patient is status post course of zosyn and Levaquin for resistant pseudomonas PNA  - as per ID, will watch off antibiotics   - RVP negative  - follow up new blood and tracheostomy asp cultures    Resp failure status post tracheostomy  - c/w mechanical ventilation  - Pulmonary follow     - c/w Duoneb and hypertonic saline     Anemia  - c/w Epo and ferrous sulfate   - status post 1 unit pRBC 10/26  - check FOBT     Severe protein-calorie malnutrition and Underweight (BMI < 19)  - c/w PEG feeds     DM   - c/w ISS    Constipation  - c/w Senna and Miralax stopped due to Diarrhea     ESRD  - c/w HD   - c/w Nephro-Kavon     Prophylaxis:  DVT: Heparin  GI: Protonix

## 2024-10-28 LAB
ALBUMIN SERPL ELPH-MCNC: 1.7 G/DL — LOW (ref 3.3–5)
ALP SERPL-CCNC: 160 U/L — HIGH (ref 40–120)
ALT FLD-CCNC: 17 U/L — SIGNIFICANT CHANGE UP (ref 12–78)
ANION GAP SERPL CALC-SCNC: 13 MMOL/L — SIGNIFICANT CHANGE UP (ref 5–17)
AST SERPL-CCNC: 29 U/L — SIGNIFICANT CHANGE UP (ref 15–37)
BILIRUB SERPL-MCNC: 0.3 MG/DL — SIGNIFICANT CHANGE UP (ref 0.2–1.2)
BUN SERPL-MCNC: 59 MG/DL — HIGH (ref 7–23)
CALCIUM SERPL-MCNC: 9.1 MG/DL — SIGNIFICANT CHANGE UP (ref 8.5–10.1)
CHLORIDE SERPL-SCNC: 100 MMOL/L — SIGNIFICANT CHANGE UP (ref 96–108)
CO2 SERPL-SCNC: 26 MMOL/L — SIGNIFICANT CHANGE UP (ref 22–31)
CREAT SERPL-MCNC: 4.21 MG/DL — HIGH (ref 0.5–1.3)
EGFR: 14 ML/MIN/1.73M2 — LOW
GLUCOSE BLDC GLUCOMTR-MCNC: 139 MG/DL — HIGH (ref 70–99)
GLUCOSE BLDC GLUCOMTR-MCNC: 213 MG/DL — HIGH (ref 70–99)
GLUCOSE BLDC GLUCOMTR-MCNC: 295 MG/DL — HIGH (ref 70–99)
GLUCOSE BLDC GLUCOMTR-MCNC: 361 MG/DL — HIGH (ref 70–99)
GLUCOSE BLDC GLUCOMTR-MCNC: 390 MG/DL — HIGH (ref 70–99)
GLUCOSE SERPL-MCNC: 186 MG/DL — HIGH (ref 70–99)
HCT VFR BLD CALC: 28.4 % — LOW (ref 39–50)
HGB BLD-MCNC: 9.1 G/DL — LOW (ref 13–17)
MAGNESIUM SERPL-MCNC: 2.2 MG/DL — SIGNIFICANT CHANGE UP (ref 1.6–2.6)
MCHC RBC-ENTMCNC: 24.3 PG — LOW (ref 27–34)
MCHC RBC-ENTMCNC: 32 G/DL — SIGNIFICANT CHANGE UP (ref 32–36)
MCV RBC AUTO: 75.9 FL — LOW (ref 80–100)
NRBC # BLD: 0 /100 WBCS — SIGNIFICANT CHANGE UP (ref 0–0)
OB PNL STL: NEGATIVE — SIGNIFICANT CHANGE UP
PHOSPHATE SERPL-MCNC: 4.7 MG/DL — HIGH (ref 2.5–4.5)
PLATELET # BLD AUTO: 237 K/UL — SIGNIFICANT CHANGE UP (ref 150–400)
POTASSIUM SERPL-MCNC: 3.9 MMOL/L — SIGNIFICANT CHANGE UP (ref 3.5–5.3)
POTASSIUM SERPL-SCNC: 3.9 MMOL/L — SIGNIFICANT CHANGE UP (ref 3.5–5.3)
PROT SERPL-MCNC: 7.2 GM/DL — SIGNIFICANT CHANGE UP (ref 6–8.3)
RBC # BLD: 3.74 M/UL — LOW (ref 4.2–5.8)
RBC # FLD: 23.3 % — HIGH (ref 10.3–14.5)
SODIUM SERPL-SCNC: 139 MMOL/L — SIGNIFICANT CHANGE UP (ref 135–145)
WBC # BLD: 10.57 K/UL — HIGH (ref 3.8–10.5)
WBC # FLD AUTO: 10.57 K/UL — HIGH (ref 3.8–10.5)

## 2024-10-28 PROCEDURE — 99233 SBSQ HOSP IP/OBS HIGH 50: CPT

## 2024-10-28 PROCEDURE — 99232 SBSQ HOSP IP/OBS MODERATE 35: CPT

## 2024-10-28 RX ORDER — CEFTOLOZANE AND TAZOBACTAM 1; .5 G/10ML; G/10ML
2250 INJECTION, POWDER, LYOPHILIZED, FOR SOLUTION INTRAVENOUS ONCE
Refills: 0 | Status: COMPLETED | OUTPATIENT
Start: 2024-10-28 | End: 2024-10-28

## 2024-10-28 RX ORDER — CEFTOLOZANE AND TAZOBACTAM 1; .5 G/10ML; G/10ML
450 INJECTION, POWDER, LYOPHILIZED, FOR SOLUTION INTRAVENOUS EVERY 8 HOURS
Refills: 0 | Status: COMPLETED | OUTPATIENT
Start: 2024-10-29 | End: 2024-11-04

## 2024-10-28 RX ADMIN — SODIUM CHLORIDE 4 MILLILITER(S): 9 INJECTION, SOLUTION INTRAMUSCULAR; INTRAVENOUS; SUBCUTANEOUS at 11:30

## 2024-10-28 RX ADMIN — SODIUM CHLORIDE 4 MILLILITER(S): 9 INJECTION, SOLUTION INTRAMUSCULAR; INTRAVENOUS; SUBCUTANEOUS at 05:14

## 2024-10-28 RX ADMIN — Medication 10: at 23:36

## 2024-10-28 RX ADMIN — IPRATROPIUM BROMIDE AND ALBUTEROL SULFATE 3 MILLILITER(S): .5; 2.5 SOLUTION RESPIRATORY (INHALATION) at 17:58

## 2024-10-28 RX ADMIN — SODIUM CHLORIDE 4 MILLILITER(S): 9 INJECTION, SOLUTION INTRAMUSCULAR; INTRAVENOUS; SUBCUTANEOUS at 23:43

## 2024-10-28 RX ADMIN — Medication 15 UNIT(S): at 00:11

## 2024-10-28 RX ADMIN — Medication 650 MILLIGRAM(S): at 12:46

## 2024-10-28 RX ADMIN — IPRATROPIUM BROMIDE AND ALBUTEROL SULFATE 3 MILLILITER(S): .5; 2.5 SOLUTION RESPIRATORY (INHALATION) at 05:14

## 2024-10-28 RX ADMIN — CHLORHEXIDINE GLUCONATE 15 MILLILITER(S): 40 SOLUTION TOPICAL at 05:39

## 2024-10-28 RX ADMIN — Medication 650 MILLIGRAM(S): at 11:46

## 2024-10-28 RX ADMIN — CHLORHEXIDINE GLUCONATE 1 APPLICATION(S): 40 SOLUTION TOPICAL at 11:48

## 2024-10-28 RX ADMIN — CEFTOLOZANE AND TAZOBACTAM 100 MILLIGRAM(S): 1; .5 INJECTION, POWDER, LYOPHILIZED, FOR SOLUTION INTRAVENOUS at 16:28

## 2024-10-28 RX ADMIN — CHLORHEXIDINE GLUCONATE 15 MILLILITER(S): 40 SOLUTION TOPICAL at 17:09

## 2024-10-28 RX ADMIN — NYSTATIN 1 APPLICATION(S): 100000 POWDER TOPICAL at 13:36

## 2024-10-28 RX ADMIN — NYSTATIN 1 APPLICATION(S): 100000 POWDER TOPICAL at 21:23

## 2024-10-28 RX ADMIN — IPRATROPIUM BROMIDE AND ALBUTEROL SULFATE 3 MILLILITER(S): .5; 2.5 SOLUTION RESPIRATORY (INHALATION) at 11:30

## 2024-10-28 RX ADMIN — IPRATROPIUM BROMIDE AND ALBUTEROL SULFATE 3 MILLILITER(S): .5; 2.5 SOLUTION RESPIRATORY (INHALATION) at 23:43

## 2024-10-28 RX ADMIN — NYSTATIN 1 APPLICATION(S): 100000 POWDER TOPICAL at 05:39

## 2024-10-28 RX ADMIN — HEPARIN SODIUM 5000 UNIT(S): 10000 INJECTION INTRAVENOUS; SUBCUTANEOUS at 05:38

## 2024-10-28 RX ADMIN — Medication 4: at 11:57

## 2024-10-28 RX ADMIN — HEPARIN SODIUM 5000 UNIT(S): 10000 INJECTION INTRAVENOUS; SUBCUTANEOUS at 17:09

## 2024-10-28 RX ADMIN — Medication 15 UNIT(S): at 21:23

## 2024-10-28 RX ADMIN — Medication 6: at 17:12

## 2024-10-28 RX ADMIN — PANTOPRAZOLE SODIUM 40 MILLIGRAM(S): 40 TABLET, DELAYED RELEASE ORAL at 11:46

## 2024-10-28 RX ADMIN — SODIUM CHLORIDE 4 MILLILITER(S): 9 INJECTION, SOLUTION INTRAMUSCULAR; INTRAVENOUS; SUBCUTANEOUS at 17:58

## 2024-10-28 RX ADMIN — CEFTOLOZANE AND TAZOBACTAM 33.33 MILLIGRAM(S): 1; .5 INJECTION, POWDER, LYOPHILIZED, FOR SOLUTION INTRAVENOUS at 23:36

## 2024-10-28 RX ADMIN — Medication 300 MILLIGRAM(S): at 11:47

## 2024-10-28 RX ADMIN — Medication 1 TABLET(S): at 11:58

## 2024-10-28 NOTE — PROGRESS NOTE ADULT - SUBJECTIVE AND OBJECTIVE BOX
JIMMY BLAND  MRN-[id number]  73y (1950)    Follow Up:   Pseudomonas in tracheal aspirate, fevers, multifocal pna     Interval History: The pt was seen and examined earlier, not in acute distress, pt is awake, fatigued, nods occasionally in response to my questions. Pt is having fevers, Tmax 102.2 today, vented via tracheostomy, WBC better 10.57.     PAST MEDICAL & SURGICAL HISTORY:  ESRD on hemodialysis      HTN (hypertension)      Insulin dependent type 2 diabetes mellitus      History of cerebrovascular accident (CVA) with residual deficit      History of DVT of lower extremity      Arteriovenous fistula      S/P percutaneous endoscopic gastrostomy (PEG) tube placement      History of tracheostomy          ROS:    [x ] Unobtainable because: vented   [ ] All other systems negative    Constitutional: no fever, no chills  Head: no trauma  Eyes: no vision changes, no eye pain  ENT:  no sore throat, no rhinorrhea  Cardiovascular:  no chest pain, no palpitation  Respiratory:  no SOB, no cough  GI:  no abd pain, no vomiting, no diarrhea  urinary: no dysuria, no hematuria, no flank pain  musculoskeletal:  no joint pain, no joint swelling  skin:  no rash  neurology:  no headache, no seizure, no change in mental status  psych: no anxiety, no depression         Allergies  No Known Allergies        ANTIMICROBIALS:      OTHER MEDS:  acetaminophen   Oral Liquid .. 650 milliGRAM(s) Oral every 6 hours PRN  albuterol/ipratropium for Nebulization 3 milliLiter(s) Nebulizer every 6 hours  chlorhexidine 0.12% Liquid 15 milliLiter(s) Oral Mucosa every 12 hours  chlorhexidine 2% Cloths 1 Application(s) Topical <User Schedule>  dextrose 5%. 1000 milliLiter(s) IV Continuous <Continuous>  dextrose 5%. 1000 milliLiter(s) IV Continuous <Continuous>  dextrose 50% Injectable 12.5 Gram(s) IV Push once  dextrose 50% Injectable 25 Gram(s) IV Push once  dextrose 50% Injectable 25 Gram(s) IV Push once  dextrose Oral Gel 15 Gram(s) Oral once PRN  epoetin reilly-epbx (RETACRIT) Injectable 58200 Unit(s) IV Push <User Schedule>  ferrous    sulfate Liquid 300 milliGRAM(s) Enteral Tube daily  glucagon  Injectable 1 milliGRAM(s) IntraMuscular once  heparin   Injectable 5000 Unit(s) SubCutaneous every 12 hours  insulin glargine Injectable (LANTUS) 15 Unit(s) SubCutaneous at bedtime  insulin lispro (ADMELOG) corrective regimen sliding scale   SubCutaneous every 6 hours  Nephro-noam 1 Tablet(s) Oral daily  nystatin Powder 1 Application(s) Topical every 8 hours  pantoprazole   Suspension 40 milliGRAM(s) Enteral Tube daily  sodium chloride 3%  Inhalation 4 milliLiter(s) Inhalation every 6 hours      Vital Signs Last 24 Hrs  T(C): 37.7 (28 Oct 2024 16:13), Max: 39 (28 Oct 2024 11:13)  T(F): 99.8 (28 Oct 2024 16:13), Max: 102.2 (28 Oct 2024 11:13)  HR: 90 (28 Oct 2024 16:59) (86 - 108)  BP: 133/59 (28 Oct 2024 16:00) (125/60 - 160/67)  BP(mean): 81 (28 Oct 2024 16:00) (78 - 94)  RR: 13 (28 Oct 2024 16:13) (12 - 26)  SpO2: 100% (28 Oct 2024 16:59) (95% - 100%)    Parameters below as of 28 Oct 2024 05:14  Patient On (Oxygen Delivery Method): ventilator        Physical Exam:  General: Chronic ill appearing elderly man  HEENT: trached and on the vent   Cardio:  regular S1, S2,  no murmur  Respiratory:  Breath sounds mildly coarse bilaterally, on vent  abd:  soft,   BS +,   no tenderness, + peg    Musculoskeletal:   no joint swelling,   no edema  Lines: +PIV, RUE AV fistula , rectal tube  Skin: warm dry skin  Neurologic: awake, at times follows with his eyes, nods at times  psych: unable     WBC Count: 10.57 K/uL (10-28 @ 02:32)  WBC Count: 12.19 K/uL (10-27 @ 02:29)  WBC Count: 14.27 K/uL (10-26 @ 04:27)  WBC Count: 14.89 K/uL (10-26 @ 02:15)  WBC Count: 15.99 K/uL (10-25 @ 02:40)  WBC Count: 15.68 K/uL (10-24 @ 02:05)  WBC Count: 19.58 K/uL (10-23 @ 02:40)                            9.1    10.57 )-----------( 237      ( 28 Oct 2024 02:32 )             28.4       10-28    139  |  100  |  59[H]  ----------------------------<  186[H]  3.9   |  26  |  4.21[H]    Ca    9.1      28 Oct 2024 02:32  Phos  4.7     10-28  Mg     2.2     10-28    TPro  7.2  /  Alb  1.7[L]  /  TBili  0.3  /  DBili  x   /  AST  29  /  ALT  17  /  AlkPhos  160[H]  10-28      Urinalysis Basic - ( 28 Oct 2024 02:32 )    Color: x / Appearance: x / SG: x / pH: x  Gluc: 186 mg/dL / Ketone: x  / Bili: x / Urobili: x   Blood: x / Protein: x / Nitrite: x   Leuk Esterase: x / RBC: x / WBC x   Sq Epi: x / Non Sq Epi: x / Bacteria: x        Creatinine Trend: 4.21<--, 3.00<--, 4.04<--, 2.81<--, 4.22<--, 2.99<--      MICROBIOLOGY:  v  .Blood Blood  10-25-24   Culture is being performed.  --  --      Trach Asp Tracheal Aspirate  10-25-24   Moderate Pseudomonas aeruginosa (Carbapenem Resistant)  Commensal dominick consistent with body site  --  Pseudomonas aeruginosa (Carbapenem Resistant)      .Blood BLOOD  10-25-24   No growth at 48 Hours  --  --      .Blood BLOOD  10-18-24   No growth at 5 days  --  --      Bronchial  10-14-24   No acid-fast bacilli isolated at 1 week. ****Acid-fast cultures are held  for 6 weeks.****  --  Pseudomonas aeruginosa      Bronchial  10-08-24   Few Pseudomonas aeruginosa  Moderate Stenotrophomonas maltophilia  --  Pseudomonas aeruginosa  Stenotrophomonas maltophilia      .Stool  10-08-24   No enteric pathogens isolated.  (Stool culture examined for Salmonella,  Shigella, Campylobacter, Aeromonas, Plesiomonas,  Vibrio, E.coli O157 and Yersinia)  --  --      .Blood BLOOD  10-07-24   No growth at 5 days  --  --      .Blood BLOOD  10-07-24   No growth at 5 days  --  --      .Blood BLOOD  10-04-24   No growth at 5 days  --  --      .Blood BLOOD  10-04-24   No growth at 5 days  --  --          Rapid RVP Result: NotDetec (10-25 @ 16:55)      SARS-CoV-2: NotDetec (10-25-24 @ 16:55)  Rapid RVP Result: NotDetec (10-25-24 @ 16:55)    SARS-CoV-2: NotDetec (25 Oct 2024 16:55)  SARS-CoV-2: NotDetec (11 Oct 2024 14:45)  SARS-CoV-2: NotDetec (08 Oct 2024 10:00)  COVID-19 PCR: Detected (09 Jul 2024 13:43)  COVID-19 PCR: NotDetec (07 Jul 2024 04:30)  SARS-CoV-2: NotDetec (29 Jun 2024 22:30)  COVID-19 PCR: NotDetec (14 Jun 2024 18:56)    RADIOLOGY:

## 2024-10-28 NOTE — PROGRESS NOTE ADULT - ASSESSMENT
ESRD:  On HD TIW, last on Saturday.  - HD TIW.   - Placement at [facility].  - Limit UF due to Hypotension.  - Midodrine as needed.      Anemia:  - Transfuse for Hg < 7.  - Continue EPO.    Respiratory Failure:  Failed extubation.  - s/p Tracheostomy.    Otf Mena MD  Nephrology

## 2024-10-28 NOTE — PROGRESS NOTE ADULT - ASSESSMENT
Assessment: 72 yo M with h/o HTN, DM2, ESRD on HD (MWF, RUE AV Fistula), CVA with residual R sided weakness and dysphagia s/p peg, s/p trach since decannulated, RLE DVT (completed course of Eliquis) admitted with 1) RLL PNA with course complicated by acute hypoxic respiratory failure ultimately requiring intubation, failed trial of extubation and was subsequently reintubated 10/14 and tracheostomy on 10/14. 2) Septic shock 2 to PNA    Recs:  - Patient admitted w/ Septic shock POA due to RLL PNA with course complicated by acute hypoxic respiratory failure ultimately requiring admission to the ICU for intubation on 10/8  - Failed trial of extubation and was subsequently reintubated on 10/14  - Tracheostomy placed by surgery on 10/23; has a chronic PEG tube already   - He is s/p bronch x 2. BAL with PsAg and Stenotrophomonas & bronch culture with Pseudomonas aeruginosa on 10/8 & 10/14  - completed course of zosyn and levaquin for the above, ID following appreciated recs   - Plan was to monitor off abx however over the weekend patient has increased mucous plugging/secretions and sputum cx grew now carbapenem resistant pseudomonas in sputum (CRE)  - leukocytosis remains stable, tmax 99.9, 02 requirements remain at 40% - will request rectal temp & discuss plan with ID for abx management   - continue aggressive pulm toileting modalities with chest PT, duonebs and hypersal   - Trach to vent; appears to tolerate SBT at times with higher settings like 12/5 or 10/5 will continue to attempt daily   - HD per nephrology   - rest of care per primary team

## 2024-10-28 NOTE — PROGRESS NOTE ADULT - SUBJECTIVE AND OBJECTIVE BOX
INTERVAL HPI/OVERNIGHT EVENTS: Patient having worsening sputum production had some increased suctioning requirements over the weekend per RN   SUBJECTIVE: Patient seen and examined at bedside.   ROS: All negative except as listed above.    VITAL SIGNS:  Vital Signs Last 24 Hrs  T(C): 37.3 (28 Oct 2024 08:00), Max: 37.7 (27 Oct 2024 12:00)  T(F): 99.2 (28 Oct 2024 08:00), Max: 99.9 (27 Oct 2024 16:00)  HR: 93 (28 Oct 2024 09:30) (85 - 107)  BP: 160/67 (28 Oct 2024 08:00) (125/60 - 160/67)  BP(mean): 94 (28 Oct 2024 08:00) (78 - 95)  ABP: --  ABP(mean): --  RR: 21 (28 Oct 2024 08:00) (14 - 33)  SpO2: 100% (28 Oct 2024 09:30) (87% - 100%)    O2 Parameters below as of 28 Oct 2024 05:14  Patient On (Oxygen Delivery Method): ventilator          Mode: CPAP with PS, FiO2: 40, PEEP: 5, PS: 12, MAP: 9, PIP: 18  Plateau pressure:   P/F ratio:     10-27 @ 07:01  -  10-28 @ 07:00  --------------------------------------------------------  IN: 1000 mL / OUT: 200 mL / NET: 800 mL    10-28 @ 07:01  -  10-28 @ 10:22  --------------------------------------------------------  IN: 50 mL / OUT: 0 mL / NET: 50 mL      CAPILLARY BLOOD GLUCOSE      POCT Blood Glucose.: 139 mg/dL (28 Oct 2024 05:21)      ECG: reviewed.    PHYSICAL EXAM:  General: well, no distress   HEENT: Sclera clear, EOMI   Neck: trach   Respiratory: CTA B/L no wheezing Cough stridor  Cardiovascular: S1S2 RRR  Abdomen: soft + BS   Extremities: no edema, THAI   Skin: no rash / breakdown  Neurological: no focal deficits   Psychiatry: appropriate     MEDICATIONS:  MEDICATIONS  (STANDING):  albuterol/ipratropium for Nebulization 3 milliLiter(s) Nebulizer every 6 hours  chlorhexidine 0.12% Liquid 15 milliLiter(s) Oral Mucosa every 12 hours  chlorhexidine 2% Cloths 1 Application(s) Topical <User Schedule>  dextrose 5%. 1000 milliLiter(s) (100 mL/Hr) IV Continuous <Continuous>  dextrose 5%. 1000 milliLiter(s) (50 mL/Hr) IV Continuous <Continuous>  dextrose 50% Injectable 25 Gram(s) IV Push once  dextrose 50% Injectable 12.5 Gram(s) IV Push once  dextrose 50% Injectable 25 Gram(s) IV Push once  epoetin reilly-epbx (RETACRIT) Injectable 87112 Unit(s) IV Push <User Schedule>  ferrous    sulfate Liquid 300 milliGRAM(s) Enteral Tube daily  glucagon  Injectable 1 milliGRAM(s) IntraMuscular once  heparin   Injectable 5000 Unit(s) SubCutaneous every 12 hours  insulin glargine Injectable (LANTUS) 15 Unit(s) SubCutaneous at bedtime  insulin lispro (ADMELOG) corrective regimen sliding scale   SubCutaneous every 6 hours  Nephro-noam 1 Tablet(s) Oral daily  nystatin Powder 1 Application(s) Topical every 8 hours  pantoprazole   Suspension 40 milliGRAM(s) Enteral Tube daily  sodium chloride 3%  Inhalation 4 milliLiter(s) Inhalation every 6 hours    MEDICATIONS  (PRN):  acetaminophen   Oral Liquid .. 650 milliGRAM(s) Oral every 6 hours PRN Temp greater or equal to 38.5C (101.3F)  dextrose Oral Gel 15 Gram(s) Oral once PRN Blood Glucose LESS THAN 70 milliGRAM(s)/deciliter  HYDROmorphone  Injectable 0.5 milliGRAM(s) IV Push every 4 hours PRN Severe Pain (7 - 10)      ALLERGIES:  Allergies    No Known Allergies    Intolerances        LABS:                        9.1    10.57 )-----------( 237      ( 28 Oct 2024 02:32 )             28.4     10-28    139  |  100  |  59[H]  ----------------------------<  186[H]  3.9   |  26  |  4.21[H]    Ca    9.1      28 Oct 2024 02:32  Phos  4.7     10-28  Mg     2.2     10-28    TPro  7.2  /  Alb  1.7[L]  /  TBili  0.3  /  DBili  x   /  AST  29  /  ALT  17  /  AlkPhos  160[H]  10-28      Urinalysis Basic - ( 28 Oct 2024 02:32 )    Color: x / Appearance: x / SG: x / pH: x  Gluc: 186 mg/dL / Ketone: x  / Bili: x / Urobili: x   Blood: x / Protein: x / Nitrite: x   Leuk Esterase: x / RBC: x / WBC x   Sq Epi: x / Non Sq Epi: x / Bacteria: x      ABG:      vBG:    Micro:    Culture - Blood (collected 10-25-24 @ 16:55)  Source: .Blood BLOOD  Preliminary Report (10-27-24 @ 23:00):    No growth at 48 Hours    Culture - Blood (collected 10-25-24 @ 16:55)  Source: .Blood BLOOD  Preliminary Report (10-27-24 @ 23:00):    No growth at 48 Hours    Culture - Blood (collected 10-18-24 @ 11:10)  Source: .Blood BLOOD  Final Report (10-23-24 @ 15:01):    No growth at 5 days    Culture - Blood (collected 10-07-24 @ 18:35)  Source: .Blood BLOOD  Final Report (10-13-24 @ 01:01):    No growth at 5 days    Culture - Blood (collected 10-07-24 @ 18:30)  Source: .Blood BLOOD  Final Report (10-13-24 @ 01:01):    No growth at 5 days    Culture - Blood (collected 10-04-24 @ 16:40)  Source: .Blood BLOOD  Final Report (10-10-24 @ 02:00):    No growth at 5 days    Culture - Blood (collected 10-04-24 @ 16:30)  Source: .Blood BLOOD  Final Report (10-10-24 @ 02:00):    No growth at 5 days        Culture - Sputum (collected 10-25-24 @ 17:30)  Source: Trach Asp Tracheal Aspirate  Gram Stain (10-27-24 @ 17:35):    Few polymorphonuclear leukocytes per low power field    Rare Squamous epithelial cells per low power field    Few Gram Negative Rods per oil power field  Final Report (10-27-24 @ 17:35):    Moderate Pseudomonas aeruginosa (Carbapenem Resistant)    Commensal dominick consistent with body site  Organism: Pseudomonas aeruginosa (Carbapenem Resistant) (10-27-24 @ 17:35)  Organism: Pseudomonas aeruginosa (Carbapenem Resistant) (10-27-24 @ 17:35)      Method Type: CarbaR      -  Resistance Gene to Carbapenem: Nondet  Organism: Pseudomonas aeruginosa (Carbapenem Resistant) (10-27-24 @ 17:35)      Method Type: IVDA      -  Aztreonam: R >16      -  Cefepime: I 16      -  Ceftazidime: R >16      -  Ceftazidime/Avibactam: S <=4      -  Ceftolozane/tazobactam: S 4      -  Ciprofloxacin: R >2      -  Imipenem: R 8      -  Levofloxacin: R >4      -  Meropenem: I 4      -  Piperacillin/Tazobactam: R >64        RADIOLOGY & ADDITIONAL TESTS: Reviewed. INTERVAL HPI/OVERNIGHT EVENTS: Patient having worsening sputum production had some increased suctioning requirements over the weekend per RN   SUBJECTIVE: Patient seen and examined at bedside.   ROS: unable to obtain due to medical condition, trach to vent    VITAL SIGNS:  Vital Signs Last 24 Hrs  T(C): 37.3 (28 Oct 2024 08:00), Max: 37.7 (27 Oct 2024 12:00)  T(F): 99.2 (28 Oct 2024 08:00), Max: 99.9 (27 Oct 2024 16:00)  HR: 93 (28 Oct 2024 09:30) (85 - 107)  BP: 160/67 (28 Oct 2024 08:00) (125/60 - 160/67)  BP(mean): 94 (28 Oct 2024 08:00) (78 - 95)  RR: 21 (28 Oct 2024 08:00) (14 - 33)  SpO2: 100% (28 Oct 2024 09:30) (87% - 100%)    O2 Parameters below as of 28 Oct 2024 05:14  Patient On (Oxygen Delivery Method): ventilator          Mode: CPAP with PS, FiO2: 40, PEEP: 5, PS: 12, MAP: 9, PIP: 18      10-27 @ 07:01  -  10-28 @ 07:00  --------------------------------------------------------  IN: 1000 mL / OUT: 200 mL / NET: 800 mL    10-28 @ 07:01  -  10-28 @ 10:22  --------------------------------------------------------  IN: 50 mL / OUT: 0 mL / NET: 50 mL      CAPILLARY BLOOD GLUCOSE  POCT Blood Glucose.: 139 mg/dL (28 Oct 2024 05:21)        PHYSICAL EXAM:  General: no distress, comfortable in bed, thin male  HEENT: Sclera clear, EOMI   Neck: trach   Respiratory: CTA B/L no wheezing Cough stridor, mechanical breath sounds  Cardiovascular: S1S2 RRR  Abdomen: soft + BS +PEG  Extremities: no edema, no cyanosis   Skin: no rash / breakdown  Neurological: awake, follows simple commands      MEDICATIONS:  MEDICATIONS  (STANDING):  albuterol/ipratropium for Nebulization 3 milliLiter(s) Nebulizer every 6 hours  chlorhexidine 0.12% Liquid 15 milliLiter(s) Oral Mucosa every 12 hours  chlorhexidine 2% Cloths 1 Application(s) Topical <User Schedule>  dextrose 5%. 1000 milliLiter(s) (100 mL/Hr) IV Continuous <Continuous>  dextrose 5%. 1000 milliLiter(s) (50 mL/Hr) IV Continuous <Continuous>  dextrose 50% Injectable 25 Gram(s) IV Push once  dextrose 50% Injectable 12.5 Gram(s) IV Push once  dextrose 50% Injectable 25 Gram(s) IV Push once  epoetin reilly-epbx (RETACRIT) Injectable 70888 Unit(s) IV Push <User Schedule>  ferrous    sulfate Liquid 300 milliGRAM(s) Enteral Tube daily  glucagon  Injectable 1 milliGRAM(s) IntraMuscular once  heparin   Injectable 5000 Unit(s) SubCutaneous every 12 hours  insulin glargine Injectable (LANTUS) 15 Unit(s) SubCutaneous at bedtime  insulin lispro (ADMELOG) corrective regimen sliding scale   SubCutaneous every 6 hours  Nephro-noam 1 Tablet(s) Oral daily  nystatin Powder 1 Application(s) Topical every 8 hours  pantoprazole   Suspension 40 milliGRAM(s) Enteral Tube daily  sodium chloride 3%  Inhalation 4 milliLiter(s) Inhalation every 6 hours    MEDICATIONS  (PRN):  acetaminophen   Oral Liquid .. 650 milliGRAM(s) Oral every 6 hours PRN Temp greater or equal to 38.5C (101.3F)  dextrose Oral Gel 15 Gram(s) Oral once PRN Blood Glucose LESS THAN 70 milliGRAM(s)/deciliter  HYDROmorphone  Injectable 0.5 milliGRAM(s) IV Push every 4 hours PRN Severe Pain (7 - 10)        Allergies  No Known Allergies            LABS:                        9.1    10.57 )-----------( 237      ( 28 Oct 2024 02:32 )             28.4     10-28    139  |  100  |  59[H]  ----------------------------<  186[H]  3.9   |  26  |  4.21[H]    Ca    9.1      28 Oct 2024 02:32  Phos  4.7     10-28  Mg     2.2     10-28    TPro  7.2  /  Alb  1.7[L]  /  TBili  0.3  /  DBili  x   /  AST  29  /  ALT  17  /  AlkPhos  160[H]  10-28          Micro:    Culture - Blood (collected 10-25-24 @ 16:55)  Source: .Blood BLOOD  Preliminary Report (10-27-24 @ 23:00):    No growth at 48 Hours    Culture - Blood (collected 10-25-24 @ 16:55)  Source: .Blood BLOOD  Preliminary Report (10-27-24 @ 23:00):    No growth at 48 Hours    Culture - Blood (collected 10-18-24 @ 11:10)  Source: .Blood BLOOD  Final Report (10-23-24 @ 15:01):    No growth at 5 days    Culture - Blood (collected 10-07-24 @ 18:35)  Source: .Blood BLOOD  Final Report (10-13-24 @ 01:01):    No growth at 5 days    Culture - Blood (collected 10-07-24 @ 18:30)  Source: .Blood BLOOD  Final Report (10-13-24 @ 01:01):    No growth at 5 days    Culture - Blood (collected 10-04-24 @ 16:40)  Source: .Blood BLOOD  Final Report (10-10-24 @ 02:00):    No growth at 5 days    Culture - Blood (collected 10-04-24 @ 16:30)  Source: .Blood BLOOD  Final Report (10-10-24 @ 02:00):    No growth at 5 days        Culture - Sputum (collected 10-25-24 @ 17:30)  Source: Trach Asp Tracheal Aspirate  Gram Stain (10-27-24 @ 17:35):    Few polymorphonuclear leukocytes per low power field    Rare Squamous epithelial cells per low power field    Few Gram Negative Rods per oil power field  Final Report (10-27-24 @ 17:35):    Moderate Pseudomonas aeruginosa (Carbapenem Resistant)    Commensal dominick consistent with body site  Organism: Pseudomonas aeruginosa (Carbapenem Resistant) (10-27-24 @ 17:35)  Organism: Pseudomonas aeruginosa (Carbapenem Resistant) (10-27-24 @ 17:35)      Method Type: CarbaR      -  Resistance Gene to Carbapenem: Nondet  Organism: Pseudomonas aeruginosa (Carbapenem Resistant) (10-27-24 @ 17:35)      Method Type: VIDA      -  Aztreonam: R >16      -  Cefepime: I 16      -  Ceftazidime: R >16      -  Ceftazidime/Avibactam: S <=4      -  Ceftolozane/tazobactam: S 4      -  Ciprofloxacin: R >2      -  Imipenem: R 8      -  Levofloxacin: R >4      -  Meropenem: I 4      -  Piperacillin/Tazobactam: R >64        RADIOLOGY & ADDITIONAL TESTS: Reviewed.

## 2024-10-28 NOTE — PROGRESS NOTE ADULT - NS ATTEND AMEND GEN_ALL_CORE FT
I have reviewed all pertinent clinical information and agree with the NP's note.  Any new labs, recent cultures, new imaging (if applicable) and vitals have been reviewed today.  All necessary adjustments to management have been made.  Agree with the above assessment and plan.    - now febrile with slightly worse secretions   - start (Zerbaxa) ceftolozane/tazobactam to cover carbapenem-resistant pseudomonas   - obtain two sets of blood cultures    - contact isolation for carbapenem-resistant organism     Discussed plan with patients primary team.    Nelson Magallon DO  Chief, Infectious Disease at [facility]  Reachable via Microsoft Teams or ID office: [phone]  Weekdays: After 5pm, please call [phone] for all inquiries and new consults  Weekends: Message on-call infectious disease physician via teams (tawny Poon)

## 2024-10-28 NOTE — PROGRESS NOTE ADULT - SUBJECTIVE AND OBJECTIVE BOX
ROGELIOJAYSON RUSS  73y  Patient is a 73y old  Male who presents with a chief complaint of Sepsis and acute hypoxic respiratory failure secondary to suspected aspiration PNA vs. HCAP (27 Oct 2024 21:41)    HPI:  Seen and examined. No new complaints. He remains lethargic.    HEALTH ISSUES - PROBLEM Dx:  Sepsis due to pneumonia    Acute respiratory failure with hypoxia    Hypertensive urgency    Constipation    Lactic acidosis    Insulin dependent type 2 diabetes mellitus    ESRD on hemodialysis    History of cerebrovascular accident (CVA) with residual deficit    History of DVT of lower extremity    Microcytic anemia    Hypoxia    Severe protein-calorie malnutrition    Encounter for palliative care          MEDICATIONS  (STANDING):  albuterol/ipratropium for Nebulization 3 milliLiter(s) Nebulizer every 6 hours  chlorhexidine 0.12% Liquid 15 milliLiter(s) Oral Mucosa every 12 hours  chlorhexidine 2% Cloths 1 Application(s) Topical <User Schedule>  dextrose 5%. 1000 milliLiter(s) (50 mL/Hr) IV Continuous <Continuous>  dextrose 5%. 1000 milliLiter(s) (100 mL/Hr) IV Continuous <Continuous>  dextrose 50% Injectable 12.5 Gram(s) IV Push once  dextrose 50% Injectable 25 Gram(s) IV Push once  dextrose 50% Injectable 25 Gram(s) IV Push once  epoetin reilly-epbx (RETACRIT) Injectable 00965 Unit(s) IV Push <User Schedule>  ferrous    sulfate Liquid 300 milliGRAM(s) Enteral Tube daily  glucagon  Injectable 1 milliGRAM(s) IntraMuscular once  heparin   Injectable 5000 Unit(s) SubCutaneous every 12 hours  insulin glargine Injectable (LANTUS) 15 Unit(s) SubCutaneous at bedtime  insulin lispro (ADMELOG) corrective regimen sliding scale   SubCutaneous every 6 hours  Nephro-noam 1 Tablet(s) Oral daily  nystatin Powder 1 Application(s) Topical every 8 hours  pantoprazole   Suspension 40 milliGRAM(s) Enteral Tube daily  sodium chloride 3%  Inhalation 4 milliLiter(s) Inhalation every 6 hours    MEDICATIONS  (PRN):  acetaminophen   Oral Liquid .. 650 milliGRAM(s) Oral every 6 hours PRN Temp greater or equal to 38.5C (101.3F)  dextrose Oral Gel 15 Gram(s) Oral once PRN Blood Glucose LESS THAN 70 milliGRAM(s)/deciliter  HYDROmorphone  Injectable 0.5 milliGRAM(s) IV Push every 4 hours PRN Severe Pain (7 - 10)    Vital Signs Last 24 Hrs  T(C): 37.3 (28 Oct 2024 08:00), Max: 37.7 (27 Oct 2024 12:00)  T(F): 99.2 (28 Oct 2024 08:00), Max: 99.9 (27 Oct 2024 16:00)  HR: 93 (28 Oct 2024 09:30) (85 - 107)  BP: 160/67 (28 Oct 2024 08:00) (125/60 - 160/67)  BP(mean): 94 (28 Oct 2024 08:00) (78 - 95)  RR: 21 (28 Oct 2024 08:00) (14 - 33)  SpO2: 100% (28 Oct 2024 09:30) (87% - 100%)    Parameters below as of 28 Oct 2024 05:14  Patient On (Oxygen Delivery Method): ventilator      Daily     Daily Weight in k.1 (28 Oct 2024 06:30)    PHYSICAL EXAM:  Constitutional: He  appears comfortable and not distressed. Not diaphoretic.    Neck:  The thyroid is normal. Tracheostomy in place.    Respiratory: The lungs are clear to auscultation. No dullness and expansion is normal.    Cardiovascular: S1 and S2 are normal. No murmurs rubs or gallops are present.    Gastrointestinal: The abdomen is soft. No tenderness is present. No masses are present. Bowel sounds are normal.    Genitourinary: The bladder is not distended. No CVA tenderness is present.    Extremities: No edema is noted. No deformities are present.    Neurological: Opens eyes.    Skin: No lesions are seen  or palpated.    Lymph Nodes: No lymphadenopathy is present.                                9.1    10.57 )-----------( 237      ( 28 Oct 2024 02:32 )             28.4     10-    139  |  100  |  59[H]  ----------------------------<  186[H]  3.9   |  26  |  4.21[H]    Ca    9.1      28 Oct 2024 02:32  Phos  4.7     10-  Mg     2.2     10-    TPro  7.2  /  Alb  1.7[L]  /  TBili  0.3  /  DBili  x   /  AST  29  /  ALT  17  /  AlkPhos  160[H]  10-28

## 2024-10-28 NOTE — PROGRESS NOTE ADULT - SUBJECTIVE AND OBJECTIVE BOX
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 The patient was admitted w/ Septic shock POA due to RLL PNA with course complicated by acute hypoxic respiratory failure ultimately requiring admission to the ICU for intubation on 10/8. The pt failed trial of extubation and was subsequently reintubated on 10/14 & trached on 10/23. Pt was downgraded to the medical floors on 10/24. He is lying in bed in NAD.    MEDICATIONS  (STANDING):  albuterol/ipratropium for Nebulization 3 milliLiter(s) Nebulizer every 6 hours  chlorhexidine 0.12% Liquid 15 milliLiter(s) Oral Mucosa every 12 hours  chlorhexidine 2% Cloths 1 Application(s) Topical <User Schedule>  dextrose 5%. 1000 milliLiter(s) (100 mL/Hr) IV Continuous <Continuous>  dextrose 5%. 1000 milliLiter(s) (50 mL/Hr) IV Continuous <Continuous>  dextrose 50% Injectable 25 Gram(s) IV Push once  dextrose 50% Injectable 12.5 Gram(s) IV Push once  dextrose 50% Injectable 25 Gram(s) IV Push once  epoetin reilly-epbx (RETACRIT) Injectable 58270 Unit(s) IV Push <User Schedule>  ferrous    sulfate Liquid 300 milliGRAM(s) Enteral Tube daily  glucagon  Injectable 1 milliGRAM(s) IntraMuscular once  heparin   Injectable 5000 Unit(s) SubCutaneous every 12 hours  insulin glargine Injectable (LANTUS) 15 Unit(s) SubCutaneous at bedtime  insulin lispro (ADMELOG) corrective regimen sliding scale   SubCutaneous every 6 hours  Nephro-noam 1 Tablet(s) Oral daily  nystatin Powder 1 Application(s) Topical every 8 hours  pantoprazole   Suspension 40 milliGRAM(s) Enteral Tube daily  sodium chloride 3%  Inhalation 4 milliLiter(s) Inhalation every 6 hours    MEDICATIONS  (PRN):  acetaminophen   Oral Liquid .. 650 milliGRAM(s) Oral every 6 hours PRN Temp greater or equal to 38.5C (101.3F)  dextrose Oral Gel 15 Gram(s) Oral once PRN Blood Glucose LESS THAN 70 milliGRAM(s)/deciliter      Allergies    No Known Allergies    Intolerances        Vital Signs Last 24 Hrs  T(C): 37.6 (28 Oct 2024 19:24), Max: 39 (28 Oct 2024 11:13)  T(F): 99.7 (28 Oct 2024 19:24), Max: 102.2 (28 Oct 2024 11:13)  HR: 96 (28 Oct 2024 19:05) (85 - 108)  BP: 133/59 (28 Oct 2024 16:00) (125/60 - 160/67)  BP(mean): 81 (28 Oct 2024 16:00) (78 - 94)  RR: 27 (28 Oct 2024 19:05) (12 - 27)  SpO2: 99% (28 Oct 2024 19:05) (95% - 100%)    Parameters below as of 28 Oct 2024 18:09  Patient On (Oxygen Delivery Method): ventilator    PHYSICAL EXAM:  GENERAL: NAD,   HEAD: Atraumatic, Normocephalic   NECK: trached  NERVOUS SYSTEM: lethargic   CHEST/LUNG: bilateral vent sounds   HEART: Regular rate and rhythm; No murmurs, rubs, or gallops  ABDOMEN: Soft, Nontender, Nondistended; Bowel sounds present  EXTREMITIES: No clubbing, cyanosis, or edema      LABS:                        9.1    10.57 )-----------( 237      ( 28 Oct 2024 02:32 )             28.4     10-28    139  |  100  |  59[H]  ----------------------------<  186[H]  3.9   |  26  |  4.21[H]    Ca    9.1      28 Oct 2024 02:32  Phos  4.7     10-28  Mg     2.2     10-28    TPro  7.2  /  Alb  1.7[L]  /  TBili  0.3  /  DBili  x   /  AST  29  /  ALT  17  /  AlkPhos  160[H]  10-28      Urinalysis Basic - ( 28 Oct 2024 02:32 )    Color: x / Appearance: x / SG: x / pH: x  Gluc: 186 mg/dL / Ketone: x  / Bili: x / Urobili: x   Blood: x / Protein: x / Nitrite: x   Leuk Esterase: x / RBC: x / WBC x   Sq Epi: x / Non Sq Epi: x / Bacteria: x      CAPILLARY BLOOD GLUCOSE      POCT Blood Glucose.: 295 mg/dL (28 Oct 2024 17:10)  POCT Blood Glucose.: 213 mg/dL (28 Oct 2024 11:51)  POCT Blood Glucose.: 139 mg/dL (28 Oct 2024 05:21)  POCT Blood Glucose.: 324 mg/dL (27 Oct 2024 23:18)      Culture - Acid Fast - Blood (collected 25 Oct 2024 18:35)  Source: .Blood Blood  Preliminary Report (25 Oct 2024 22:43):    Culture is being performed.    Culture - Sputum (collected 25 Oct 2024 17:30)  Source: Trach Asp Tracheal Aspirate  Gram Stain (27 Oct 2024 17:35):    Few polymorphonuclear leukocytes per low power field    Rare Squamous epithelial cells per low power field    Few Gram Negative Rods per oil power field  Final Report (27 Oct 2024 17:35):    Moderate Pseudomonas aeruginosa (Carbapenem Resistant)    Commensal dominick consistent with body site  Organism: Pseudomonas aeruginosa (Carbapenem Resistant) (27 Oct 2024 17:35)  Organism: Pseudomonas aeruginosa (Carbapenem Resistant) (27 Oct 2024 17:35)  Organism: Pseudomonas aeruginosa (Carbapenem Resistant) (27 Oct 2024 17:35)    Culture - Blood (collected 25 Oct 2024 16:55)  Source: .Blood BLOOD  Preliminary Report (27 Oct 2024 23:00):    No growth at 48 Hours    Culture - Blood (collected 25 Oct 2024 16:55)  Source: .Blood BLOOD  Preliminary Report (27 Oct 2024 23:00):    No growth at 48 Hours      RADIOLOGY & ADDITIONAL TESTS:    10-27-24 @ 07:01  -  10-28-24 @ 07:00  --------------------------------------------------------  IN:    Nepro with Carb Steady: 950 mL    Oral Fluid: 50 mL  Total IN: 1000 mL    OUT:    Rectal Tube (mL): 200 mL  Total OUT: 200 mL    Total NET: 800 mL      10-28-24 @ 07:01  -  10-28-24 @ 19:36  --------------------------------------------------------  IN:    Enteral Tube Flush: 360 mL    IV PiggyBack: 100 mL    Nepro with Carb Steady: 300 mL  Total IN: 760 mL    OUT:    Rectal Tube (mL): 150 mL  Total OUT: 150 mL    Total NET: 610 mL

## 2024-10-28 NOTE — PROGRESS NOTE ADULT - NS ATTEND AMEND GEN_ALL_CORE FT
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 Febrile in ED with noted leukocytosis. CT chest showed RLL pneumonia. Course with worsening acute hypoxemic respiratory failure, RRTs for hypoxia. Upgraded to ICU 10/8 for worsening hypoxemia high flow -> BiPAP. Intubated 10/8 for hypoxia due to PNA and thick secretions. s/p bronchoscopy 10/8: BAL cx with pseudomonas and Stenotrophomonas. Weaned and extubated 10/10, failed extubation and reintubated for hypoxia 10/14 s/p repeat bronchoscopy with thick secretions retrieved cultures with pseudomonas. s/p trach 10/23. Downgraded to medical service 10/24. Course with fevers. Sputum cx 10/25 with carbapenem resistant pseudomonas.     DX: acute hypoxic respiratory failure requiring intubation/reintubation, failure to wean s/p trach, gram negative PNA (pseudomonas and stenotrophomonas), carbapenem resistant pseudomonas infection, ESRD on HD, HTN, sepsis present on admission    - started on zerbaxa (day #1) for carbapenem resistant pseudomonas PNA. antibx per ID  - s/p levaquin 10/16 - 10/22 for pseudomonas/stenotrophomonas in bronch cx  - s/p bactrim 10/12 - 10/16 for stenotrophomonas  - s/p zosyn 10/4 - 10/8  - cont daily weaning with pressure support as tolerates during the day. tolerating pressure support 12/5 today. will trial PS 10/5 if he continues to tolerate 12/5  - rest on full vent support at night  - cont duonebs q6 hrs + hypersal nebs for impaired secretion mobilization. secretions increased likely in setting of CRE pseudomonas PNA  - s/p trach POD #5 (#7 portex in place)  - local trach care  - tracheal suctioning as needed  - HD per nephrology  - DVT ppx: on heparin sc  - remainder of care per primary team  - full code

## 2024-10-28 NOTE — PROGRESS NOTE ADULT - ASSESSMENT
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 The patient was admitted w/ Septic shock POA due to RLL PNA with course complicated by acute hypoxic respiratory failure ultimately requiring admission to the ICU for intubation on 10/8. The pt failed trial of extubation and was subsequently reintubated on 10/14 & trached on 10/23. Pt was downgraded to the medical floors on 10/24.      Fevers & leukocytosis in patient status post  Multifocal PNA treatment   - patient is status post course of zosyn and Levaquin for resistant pseudomonas PNA  - as per ID, will watch off antibiotics   - RVP negative  - patient has had increased secretions and sputum cx grew now carbapenem resistant pseudomonas in sputum  - ID has started Zerbaxa    Resp failure status post tracheostomy  - c/w mechanical ventilation  - Pulmonary follow     - c/w Duoneb and hypertonic saline     Anemia  - c/w Epo and ferrous sulfate   - status post 1 unit pRBC 10/26  - check FOBT     Severe protein-calorie malnutrition and Underweight (BMI < 19)  - c/w PEG feeds     DM   - c/w ISS    Constipation  - c/w Senna and Miralax stopped due to Diarrhea     ESRD  - c/w HD   - c/w Nephro-Kavon     Prophylaxis:  DVT: Heparin  GI: Protonix     Called the pt's wife and updated her about the pt's new infection and restarting antibiotics.

## 2024-10-28 NOTE — PHARMACOTHERAPY INTERVENTION NOTE - COMMENTS
Recommended HD dose of ceftolozane/tazobactam: 2250mg loading dose followed by 450mg q8h administered over 3h as an extended infusion to maximize time above VIDA.

## 2024-10-29 LAB
ANION GAP SERPL CALC-SCNC: 13 MMOL/L — SIGNIFICANT CHANGE UP (ref 5–17)
BUN SERPL-MCNC: 80 MG/DL — HIGH (ref 7–23)
CALCIUM SERPL-MCNC: 9.1 MG/DL — SIGNIFICANT CHANGE UP (ref 8.5–10.1)
CHLORIDE SERPL-SCNC: 95 MMOL/L — LOW (ref 96–108)
CO2 SERPL-SCNC: 25 MMOL/L — SIGNIFICANT CHANGE UP (ref 22–31)
CREAT SERPL-MCNC: 5.08 MG/DL — HIGH (ref 0.5–1.3)
EGFR: 11 ML/MIN/1.73M2 — LOW
GLUCOSE BLDC GLUCOMTR-MCNC: 101 MG/DL — HIGH (ref 70–99)
GLUCOSE BLDC GLUCOMTR-MCNC: 269 MG/DL — HIGH (ref 70–99)
GLUCOSE BLDC GLUCOMTR-MCNC: 292 MG/DL — HIGH (ref 70–99)
GLUCOSE BLDC GLUCOMTR-MCNC: 321 MG/DL — HIGH (ref 70–99)
GLUCOSE SERPL-MCNC: 270 MG/DL — HIGH (ref 70–99)
HCT VFR BLD CALC: 27.5 % — LOW (ref 39–50)
HGB BLD-MCNC: 8.8 G/DL — LOW (ref 13–17)
MAGNESIUM SERPL-MCNC: 2.4 MG/DL — SIGNIFICANT CHANGE UP (ref 1.6–2.6)
MCHC RBC-ENTMCNC: 24.9 PG — LOW (ref 27–34)
MCHC RBC-ENTMCNC: 32 G/DL — SIGNIFICANT CHANGE UP (ref 32–36)
MCV RBC AUTO: 77.9 FL — LOW (ref 80–100)
NRBC # BLD: 0 /100 WBCS — SIGNIFICANT CHANGE UP (ref 0–0)
PHOSPHATE SERPL-MCNC: 5.9 MG/DL — HIGH (ref 2.5–4.5)
PLATELET # BLD AUTO: 254 K/UL — SIGNIFICANT CHANGE UP (ref 150–400)
POTASSIUM SERPL-MCNC: 4.2 MMOL/L — SIGNIFICANT CHANGE UP (ref 3.5–5.3)
POTASSIUM SERPL-SCNC: 4.2 MMOL/L — SIGNIFICANT CHANGE UP (ref 3.5–5.3)
RBC # BLD: 3.53 M/UL — LOW (ref 4.2–5.8)
RBC # FLD: 23.5 % — HIGH (ref 10.3–14.5)
SODIUM SERPL-SCNC: 133 MMOL/L — LOW (ref 135–145)
WBC # BLD: 13.79 K/UL — HIGH (ref 3.8–10.5)
WBC # FLD AUTO: 13.79 K/UL — HIGH (ref 3.8–10.5)

## 2024-10-29 PROCEDURE — 99233 SBSQ HOSP IP/OBS HIGH 50: CPT

## 2024-10-29 PROCEDURE — 99232 SBSQ HOSP IP/OBS MODERATE 35: CPT

## 2024-10-29 RX ORDER — LACTOBACILLUS ACIDOPHILUS 25MM CELL
1 CAPSULE ORAL DAILY
Refills: 0 | Status: DISCONTINUED | OUTPATIENT
Start: 2024-10-29 | End: 2024-11-07

## 2024-10-29 RX ADMIN — IPRATROPIUM BROMIDE AND ALBUTEROL SULFATE 3 MILLILITER(S): .5; 2.5 SOLUTION RESPIRATORY (INHALATION) at 17:36

## 2024-10-29 RX ADMIN — PANTOPRAZOLE SODIUM 40 MILLIGRAM(S): 40 TABLET, DELAYED RELEASE ORAL at 12:15

## 2024-10-29 RX ADMIN — Medication 1 TABLET(S): at 12:21

## 2024-10-29 RX ADMIN — IPRATROPIUM BROMIDE AND ALBUTEROL SULFATE 3 MILLILITER(S): .5; 2.5 SOLUTION RESPIRATORY (INHALATION) at 11:20

## 2024-10-29 RX ADMIN — NYSTATIN 1 APPLICATION(S): 100000 POWDER TOPICAL at 21:16

## 2024-10-29 RX ADMIN — CEFTOLOZANE AND TAZOBACTAM 33.33 MILLIGRAM(S): 1; .5 INJECTION, POWDER, LYOPHILIZED, FOR SOLUTION INTRAVENOUS at 16:29

## 2024-10-29 RX ADMIN — IPRATROPIUM BROMIDE AND ALBUTEROL SULFATE 3 MILLILITER(S): .5; 2.5 SOLUTION RESPIRATORY (INHALATION) at 23:07

## 2024-10-29 RX ADMIN — SODIUM CHLORIDE 4 MILLILITER(S): 9 INJECTION, SOLUTION INTRAMUSCULAR; INTRAVENOUS; SUBCUTANEOUS at 23:08

## 2024-10-29 RX ADMIN — SODIUM CHLORIDE 4 MILLILITER(S): 9 INJECTION, SOLUTION INTRAMUSCULAR; INTRAVENOUS; SUBCUTANEOUS at 17:36

## 2024-10-29 RX ADMIN — Medication 15 UNIT(S): at 22:20

## 2024-10-29 RX ADMIN — Medication 6: at 22:20

## 2024-10-29 RX ADMIN — CHLORHEXIDINE GLUCONATE 15 MILLILITER(S): 40 SOLUTION TOPICAL at 17:17

## 2024-10-29 RX ADMIN — Medication 6: at 05:40

## 2024-10-29 RX ADMIN — IPRATROPIUM BROMIDE AND ALBUTEROL SULFATE 3 MILLILITER(S): .5; 2.5 SOLUTION RESPIRATORY (INHALATION) at 05:55

## 2024-10-29 RX ADMIN — NYSTATIN 1 APPLICATION(S): 100000 POWDER TOPICAL at 05:40

## 2024-10-29 RX ADMIN — SODIUM CHLORIDE 4 MILLILITER(S): 9 INJECTION, SOLUTION INTRAMUSCULAR; INTRAVENOUS; SUBCUTANEOUS at 12:12

## 2024-10-29 RX ADMIN — HEPARIN SODIUM 5000 UNIT(S): 10000 INJECTION INTRAVENOUS; SUBCUTANEOUS at 17:17

## 2024-10-29 RX ADMIN — NYSTATIN 1 APPLICATION(S): 100000 POWDER TOPICAL at 15:05

## 2024-10-29 RX ADMIN — HEPARIN SODIUM 5000 UNIT(S): 10000 INJECTION INTRAVENOUS; SUBCUTANEOUS at 05:40

## 2024-10-29 RX ADMIN — Medication 300 MILLIGRAM(S): at 14:28

## 2024-10-29 RX ADMIN — Medication 8: at 12:17

## 2024-10-29 RX ADMIN — CEFTOLOZANE AND TAZOBACTAM 33.33 MILLIGRAM(S): 1; .5 INJECTION, POWDER, LYOPHILIZED, FOR SOLUTION INTRAVENOUS at 09:14

## 2024-10-29 RX ADMIN — ERYTHROPOIETIN 10000 UNIT(S): 10000 INJECTION, SOLUTION INTRAVENOUS; SUBCUTANEOUS at 14:04

## 2024-10-29 RX ADMIN — SODIUM CHLORIDE 4 MILLILITER(S): 9 INJECTION, SOLUTION INTRAMUSCULAR; INTRAVENOUS; SUBCUTANEOUS at 05:55

## 2024-10-29 RX ADMIN — CHLORHEXIDINE GLUCONATE 1 APPLICATION(S): 40 SOLUTION TOPICAL at 05:39

## 2024-10-29 RX ADMIN — CHLORHEXIDINE GLUCONATE 15 MILLILITER(S): 40 SOLUTION TOPICAL at 05:40

## 2024-10-29 NOTE — PROGRESS NOTE ADULT - ASSESSMENT
ESRD:  On HD TIW, last on Saturday.  - HD TIW.   - Placement at [facility].  - Limit UF due to Hypotension.  - Midodrine as needed.      Anemia:  - Transfuse for Hg < 7.  - Continue EPO.    Respiratory Failure:  Failed extubation.  - s/p Tracheostomy.    Otf Mena MD  Nephrology

## 2024-10-29 NOTE — PROGRESS NOTE ADULT - ASSESSMENT
Assessment: 74 yo M with h/o HTN, DM2, ESRD on HD (MWF, RUE AV Fistula), CVA with residual R sided weakness and dysphagia s/p peg, s/p trach since decannulated, RLE DVT (completed course of Eliquis) admitted with 1) RLL PNA with course complicated by acute hypoxic respiratory failure ultimately requiring intubation, failed trial of extubation and was subsequently reintubated 10/14 and tracheostomy on 10/14. 2) Septic shock 2 to PNA    Recs:  - Patient admitted w/ Septic shock POA due to RLL PNA with course complicated by acute hypoxic respiratory failure ultimately requiring admission to the ICU for intubation on 10/8  - Failed trial of extubation and was subsequently reintubated on 10/14  - Tracheostomy placed by surgery on 10/23; has a chronic PEG tube already   - He is s/p bronch x 2. BAL with PsAg and Stenotrophomonas & bronch culture with Pseudomonas aeruginosa on 10/8 & 10/14  - completed course of zosyn and levaquin for the above, ID following appreciated recs   - Plan was to monitor off abx however over the weekend patient has increased mucous plugging/secretions and sputum cx grew now carbapenem resistant pseudomonas in sputum (CRE)  - C/W Zerbaxa. ID following, ABX as per ID.   - continue aggressive pulm toileting modalities with chest PT, duonebs and hypersal   - Trach to vent; appears to tolerate SBT at times with higher settings like 12/5 or 10/5 will continue to attempt daily   - HD per nephrology   - rest of care per primary team     Discussed with Pulm attending Dr. Overton.

## 2024-10-29 NOTE — PROGRESS NOTE ADULT - SUBJECTIVE AND OBJECTIVE BOX
BALDO JIMMY  73y  Patient is a 73y old  Male who presents with a chief complaint of Sepsis and acute hypoxic respiratory failure secondary to suspected aspiration PNA vs. HCAP (29 Oct 2024 17:18)    HPI:  ESRD on HD, last HD today.    HEALTH ISSUES - PROBLEM Dx:  Sepsis due to pneumonia    Acute respiratory failure with hypoxia    Hypertensive urgency    Constipation    Lactic acidosis    Insulin dependent type 2 diabetes mellitus    ESRD on hemodialysis    History of cerebrovascular accident (CVA) with residual deficit    History of DVT of lower extremity    Microcytic anemia    Hypoxia    Severe protein-calorie malnutrition    Encounter for palliative care          MEDICATIONS  (STANDING):  albuterol/ipratropium for Nebulization 3 milliLiter(s) Nebulizer every 6 hours  ceftolozane/tazobactam IVPB 450 milliGRAM(s) IV Intermittent every 8 hours  chlorhexidine 0.12% Liquid 15 milliLiter(s) Oral Mucosa every 12 hours  chlorhexidine 2% Cloths 1 Application(s) Topical <User Schedule>  dextrose 5%. 1000 milliLiter(s) (100 mL/Hr) IV Continuous <Continuous>  dextrose 5%. 1000 milliLiter(s) (50 mL/Hr) IV Continuous <Continuous>  dextrose 50% Injectable 25 Gram(s) IV Push once  dextrose 50% Injectable 12.5 Gram(s) IV Push once  dextrose 50% Injectable 25 Gram(s) IV Push once  epoetin reilly-epbx (RETACRIT) Injectable 39650 Unit(s) IV Push <User Schedule>  ferrous    sulfate Liquid 300 milliGRAM(s) Enteral Tube daily  glucagon  Injectable 1 milliGRAM(s) IntraMuscular once  heparin   Injectable 5000 Unit(s) SubCutaneous every 12 hours  insulin glargine Injectable (LANTUS) 15 Unit(s) SubCutaneous at bedtime  insulin lispro (ADMELOG) corrective regimen sliding scale   SubCutaneous every 6 hours  lactobacillus acidophilus 1 Tablet(s) Oral daily  Nephro-noam 1 Tablet(s) Oral daily  nystatin Powder 1 Application(s) Topical every 8 hours  pantoprazole   Suspension 40 milliGRAM(s) Enteral Tube daily  sodium chloride 3%  Inhalation 4 milliLiter(s) Inhalation every 6 hours    MEDICATIONS  (PRN):  acetaminophen   Oral Liquid .. 650 milliGRAM(s) Oral every 6 hours PRN Temp greater or equal to 38.5C (101.3F)  dextrose Oral Gel 15 Gram(s) Oral once PRN Blood Glucose LESS THAN 70 milliGRAM(s)/deciliter    Vital Signs Last 24 Hrs  T(C): 37.7 (29 Oct 2024 17:02), Max: 38.2 (29 Oct 2024 08:00)  T(F): 99.8 (29 Oct 2024 17:02), Max: 100.7 (29 Oct 2024 08:00)  HR: 106 (29 Oct 2024 17:46) (78 - 108)  BP: 124/66 (29 Oct 2024 16:00) (124/66 - 168/71)  BP(mean): 82 (29 Oct 2024 16:00) (82 - 102)  RR: 29 (29 Oct 2024 16:00) (16 - 29)  SpO2: 93% (29 Oct 2024 17:46) (88% - 100%)    Parameters below as of 29 Oct 2024 17:46  Patient On (Oxygen Delivery Method): ventilator      Daily     Daily Weight in k.5 (29 Oct 2024 15:45)    PHYSICAL EXAM:  Constitutional:  He appears comfortable and not distressed. Not diaphoretic.    Neck:  The thyroid is normal. Tracheostomy.    Breasts: Normal examination.    Respiratory: The lungs are clear to auscultation. No dullness and expansion is normal.    Cardiovascular: S1 and S2 are normal. No murmurs, rubs or gallops are present.    Gastrointestinal: The abdomen is soft. No tenderness is present. No masses are present. Bowel sounds are normal.    Genitourinary: The bladder is not distended. No CVA tenderness is present.    Extremities: No edema is noted. No deformities are present.    Neurological: Cognition is normal. Tone, power and sensation are normal. Gait is steady.    Skin: No lesions are seen  or palpated.    Lymph Nodes: No lymphadenopathy is present.                              8.8    13.79 )-----------( 254      ( 29 Oct 2024 03:00 )             27.5     10-    133[L]  |  95[L]  |  80[H]  ----------------------------<  270[H]  4.2   |  25  |  5.08[H]    Ca    9.1      29 Oct 2024 03:00  Phos  5.9     10-  Mg     2.4     10-    TPro  7.2  /  Alb  1.7[L]  /  TBili  0.3  /  DBili  x   /  AST  29  /  ALT  17  /  AlkPhos  160[H]  10-28    Urinalysis Basic - ( 29 Oct 2024 03:00 )    Color: x / Appearance: x / SG: x / pH: x  Gluc: 270 mg/dL / Ketone: x  / Bili: x / Urobili: x   Blood: x / Protein: x / Nitrite: x   Leuk Esterase: x / RBC: x / WBC x   Sq Epi: x / Non Sq Epi: x / Bacteria: x

## 2024-10-29 NOTE — PROGRESS NOTE ADULT - SUBJECTIVE AND OBJECTIVE BOX
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 The patient was admitted w/ Septic shock POA due to RLL PNA with course complicated by acute hypoxic respiratory failure ultimately requiring admission to the ICU for intubation on 10/8. The pt failed trial of extubation and was subsequently reintubated on 10/14 & trached on 10/23. Pt was downgraded to the medical floors on 10/24. He is lying in bed in NAD.     MEDICATIONS  (STANDING):  albuterol/ipratropium for Nebulization 3 milliLiter(s) Nebulizer every 6 hours  ceftolozane/tazobactam IVPB 450 milliGRAM(s) IV Intermittent every 8 hours  chlorhexidine 0.12% Liquid 15 milliLiter(s) Oral Mucosa every 12 hours  chlorhexidine 2% Cloths 1 Application(s) Topical <User Schedule>  dextrose 5%. 1000 milliLiter(s) (50 mL/Hr) IV Continuous <Continuous>  dextrose 5%. 1000 milliLiter(s) (100 mL/Hr) IV Continuous <Continuous>  dextrose 50% Injectable 12.5 Gram(s) IV Push once  dextrose 50% Injectable 25 Gram(s) IV Push once  dextrose 50% Injectable 25 Gram(s) IV Push once  epoetin reilly-epbx (RETACRIT) Injectable 03269 Unit(s) IV Push <User Schedule>  ferrous    sulfate Liquid 300 milliGRAM(s) Enteral Tube daily  glucagon  Injectable 1 milliGRAM(s) IntraMuscular once  heparin   Injectable 5000 Unit(s) SubCutaneous every 12 hours  insulin glargine Injectable (LANTUS) 15 Unit(s) SubCutaneous at bedtime  insulin lispro (ADMELOG) corrective regimen sliding scale   SubCutaneous every 6 hours  lactobacillus acidophilus 1 Tablet(s) Oral daily  Nephro-noam 1 Tablet(s) Oral daily  nystatin Powder 1 Application(s) Topical every 8 hours  pantoprazole   Suspension 40 milliGRAM(s) Enteral Tube daily  sodium chloride 3%  Inhalation 4 milliLiter(s) Inhalation every 6 hours    MEDICATIONS  (PRN):  acetaminophen   Oral Liquid .. 650 milliGRAM(s) Oral every 6 hours PRN Temp greater or equal to 38.5C (101.3F)  dextrose Oral Gel 15 Gram(s) Oral once PRN Blood Glucose LESS THAN 70 milliGRAM(s)/deciliter      Allergies    No Known Allergies    Intolerances        Vital Signs Last 24 Hrs  T(C): 37.3 (29 Oct 2024 19:15), Max: 38.2 (29 Oct 2024 08:00)  T(F): 99.2 (29 Oct 2024 19:15), Max: 100.7 (29 Oct 2024 08:00)  HR: 108 (29 Oct 2024 20:00) (78 - 108)  BP: 144/68 (29 Oct 2024 20:00) (97/57 - 168/71)  BP(mean): 92 (29 Oct 2024 20:00) (66 - 102)  RR: 30 (29 Oct 2024 20:00) (16 - 30)  SpO2: 95% (29 Oct 2024 20:00) (88% - 100%)    Parameters below as of 29 Oct 2024 17:46  Patient On (Oxygen Delivery Method): ventilator        PHYSICAL EXAM:  GENERAL: NAD,   HEAD: Atraumatic, Normocephalic   NECK: trached  NERVOUS SYSTEM: lethargic   CHEST/LUNG: bilateral vent sounds   HEART: Regular rate and rhythm; No murmurs, rubs, or gallops  ABDOMEN: Soft, Nontender, Nondistended; Bowel sounds present  EXTREMITIES: No clubbing, cyanosis, or edema      LABS:                        8.8    13.79 )-----------( 254      ( 29 Oct 2024 03:00 )             27.5     10-29    133[L]  |  95[L]  |  80[H]  ----------------------------<  270[H]  4.2   |  25  |  5.08[H]    Ca    9.1      29 Oct 2024 03:00  Phos  5.9     10-29  Mg     2.4     10-29    TPro  7.2  /  Alb  1.7[L]  /  TBili  0.3  /  DBili  x   /  AST  29  /  ALT  17  /  AlkPhos  160[H]  10-28      Urinalysis Basic - ( 29 Oct 2024 03:00 )    Color: x / Appearance: x / SG: x / pH: x  Gluc: 270 mg/dL / Ketone: x  / Bili: x / Urobili: x   Blood: x / Protein: x / Nitrite: x   Leuk Esterase: x / RBC: x / WBC x   Sq Epi: x / Non Sq Epi: x / Bacteria: x      CAPILLARY BLOOD GLUCOSE      POCT Blood Glucose.: 101 mg/dL (29 Oct 2024 17:11)  POCT Blood Glucose.: 321 mg/dL (29 Oct 2024 12:10)  POCT Blood Glucose.: 292 mg/dL (29 Oct 2024 05:38)  POCT Blood Glucose.: 390 mg/dL (28 Oct 2024 23:21)      Culture - Acid Fast - Blood (collected 25 Oct 2024 18:35)  Source: .Blood Blood  Preliminary Report (25 Oct 2024 22:43):    Culture is being performed.    Culture - Sputum (collected 25 Oct 2024 17:30)  Source: Trach Asp Tracheal Aspirate  Gram Stain (27 Oct 2024 17:35):    Few polymorphonuclear leukocytes per low power field    Rare Squamous epithelial cells per low power field    Few Gram Negative Rods per oil power field  Final Report (27 Oct 2024 17:35):    Moderate Pseudomonas aeruginosa (Carbapenem Resistant)    Commensal dominick consistent with body site  Organism: Pseudomonas aeruginosa (Carbapenem Resistant) (27 Oct 2024 17:35)  Organism: Pseudomonas aeruginosa (Carbapenem Resistant) (27 Oct 2024 17:35)  Organism: Pseudomonas aeruginosa (Carbapenem Resistant) (27 Oct 2024 17:35)    Culture - Blood (collected 25 Oct 2024 16:55)  Source: .Blood BLOOD  Preliminary Report (28 Oct 2024 23:01):    No growth at 72 Hours    Culture - Blood (collected 25 Oct 2024 16:55)  Source: .Blood BLOOD  Preliminary Report (28 Oct 2024 23:01):    No growth at 72 Hours      RADIOLOGY & ADDITIONAL TESTS:    10-28-24 @ 07:01  -  10-29-24 @ 07:00  --------------------------------------------------------  IN:    Enteral Tube Flush: 360 mL    IV PiggyBack: 300 mL    Nepro with Carb Steady: 900 mL  Total IN: 1560 mL    OUT:    Rectal Tube (mL): 370 mL  Total OUT: 370 mL    Total NET: 1190 mL      10-29-24 @ 07:01  -  10-29-24 @ 21:40  --------------------------------------------------------  IN:    Nepro with Carb Steady: 50 mL  Total IN: 50 mL    OUT:    Other (mL): 1500 mL  Total OUT: 1500 mL    Total NET: -1450 mL

## 2024-10-29 NOTE — PROGRESS NOTE ADULT - ASSESSMENT
Megha Art is a 73 year old male with PMHx of HTN, IDDM2, ESRD on HD (M/W/F, through RUE AV fistula), hx of CVA x 2 (with residual R. sided weakness and dysphagia s/p PEG tube, previously with tracheostomy and now removed), and hx of RLE DVT (completed apixaban course) who presented to the ED on 10/4/24 for complaints of cough and chills. In the ED, Tmax 101.3, HR as elevated as 118, BP as elevated as 201/68, and SpO2 as low as 91% on room air. Initially placed on 15L NRB with improvement of SpO2 to 95%. Now on room air. WBC 17.38K, hgb 9.9, sodium 129, BUN 61, Cr 4.05, alkphos 169. Lactic acid 3.6. CT head without evidence of acute hemorrhage mass or mass effect but with encephalomalacia and gliosis again seen involving the bifrontal region. CT CAP with pneumonia and rectum distended with stool. Received  cc bolus, vancomycin 1 g IV, zosyn 3.375 g IV, acetaminophen 1 g IV, hydralazine 10 mg IV, pantoprazole 40 mg IV, and ondansetron 4 mg IV. Evaluated by nephrology who is planning for HD tomorrow. (04 Oct 2024 22:03)    Hospital course has been complicated with long ICU stay, failed trial of extubation 10/11, reintubation on 10/14. He is s/p bronch x 2. BAL with PsAg and Stenotrophomonas. He was on Pip-edb from 10/4-->10/15. Switched to Levofloxacin IV on 10/16.  ID was re-called last week for antibiotics management.      10/9: intubated, sedated, getting hemodialysis again today, continue high dose zosyn while awaiting cultures, cultures negative thus far, s/p bronch and will follow those cultures and adjust antibiotics accordingly   10/10: Afebrile, Intubated, sedated. Noted with Hgb of 5.9 this am. Improved tracheal secretions per RN. On minimal vent settings, possible extubation trial today. HD tomorrow per renal. No growth on blood cultures. Bronch culture with Pseudomonas aeruginosa, pending susceptibilities.  10/11: extubated but wheezing, mucous plug, aggressive PT, awaiting pseudomonas susceptibilities    10/18: Afebrile today. T-max 100.6 F - 100.9 F last 2 days. Remains intubated, on minimal sedation, and one vasopressor. Basurto an episode of desaturation. He is on 90%FiO2. On HD today. No excessive oral secretions or diarrhea as per bedside RN. PsAg and Steno susceptibilities reviewed. MRSA screen negative on 10/10  10/21: nearing end of antibiotics, continue agressive pulmonary toilet, tracheostomy likely this week  10/25-  febrile  t-max-100.9  leukocytosis 15 k decreased from 19K  10/28: remains in ICU, downgraded, awaiting for bed, grew  pseudomonas in trach aspirate, on isolation now, + Fevers with Tmax 102.2, vented via tracheostomy, WBC better 10.57, LFTS ok, BCs NGTD, will obtain two sets of BCs, then Zerbaxa IV started. RVP negative.   10/29: low grade fever today only started (Zerbaxa) ceftolozane/tazobactam yesterday afternoon and will continue, breathing well on PS, hemodialysis today, wbc fluctuates between 10-15 now at 13.79      Impression:  1. Multifocal pneumonia- Dense RLL consolidation  2. Acute respiratory failure requiring intubation  3. Septic shock requiring vasopressor support  4. Rising leukocytosis and low grade fevers  5. ESRD on HD. Prior CVA, s/p PEG, Prior trach and decannulation, IDDM2   6.  Pseudomonas infection       Recommendations:  -completed levaquin treatment for pseudomonas pneumonia on 10/21  - became febrile with slightly worse secretions   - started (Zerbaxa) ceftolozane/tazobactam to cover carbapenem-resistant pseudomonas (Day #2)  - follow two sets of blood cultures    - vent management as per ICU team/medicine team   -Off loading, frequent turns, nutritional optimization to prevent bed sores  -Continue hemodialysis as per renal   - wound care    Discussed plan with ICU    Nelson Magallon DO  Chief, Infectious Disease at [facility]  Reachable via [employer] Teams or ID office: [phone]  Weekdays: After 5pm, please call [phone] for all inquiries and new consults  Weekends: Message on-call infectious disease physician via teams (tawny Poon)

## 2024-10-29 NOTE — PROGRESS NOTE ADULT - SUBJECTIVE AND OBJECTIVE BOX
Interval Events: No acute events. Tolerates PS during day, full ac overnight.     REVIEW OF SYSTEMS:  All negative unless listed within interval HPI       OBJECTIVE:  Vital Signs Last 24 Hrs  T(C): 37.7 (29 Oct 2024 17:02), Max: 38.2 (29 Oct 2024 08:00)  T(F): 99.8 (29 Oct 2024 17:02), Max: 100.7 (29 Oct 2024 08:00)  HR: 107 (29 Oct 2024 16:15) (78 - 108)  BP: 124/66 (29 Oct 2024 16:00) (124/66 - 168/71)  BP(mean): 82 (29 Oct 2024 16:00) (81 - 102)  ABP: --  ABP(mean): --  RR: 29 (29 Oct 2024 16:00) (16 - 29)  SpO2: 100% (29 Oct 2024 16:15) (88% - 100%)    O2 Parameters below as of 29 Oct 2024 15:45  Patient On (Oxygen Delivery Method): ventilator          Mode: AC/ CMV (Assist Control/ Continuous Mandatory Ventilation), RR (machine): 16, TV (machine): 400, FiO2: 30, PEEP: 5, ITime: 1, MAP: 9, PIP: 22    10-28 @ 07:01  -  10-29 @ 07:00  --------------------------------------------------------  IN: 1560 mL / OUT: 370 mL / NET: 1190 mL    10-29 @ 07:01  -  10-29 @ 17:18  --------------------------------------------------------  IN: 0 mL / OUT: 1500 mL / NET: -1500 mL      CAPILLARY BLOOD GLUCOSE      POCT Blood Glucose.: 101 mg/dL (29 Oct 2024 17:11)      PHYSICAL EXAM:  General: well appearing, NAD  HEENT: Sclera clear, PERRL  Neck: +trach   Respiratory: CTA B/L no wheezing Cough stridor  Cardiovascular: S1S2 RRR  Abdomen: soft + BS   Extremities: no edema, THAI   Skin: no rash / breakdown  Neurological: no focal deficits   Psychiatry: appropriate     HOSPITAL MEDICATIONS:  heparin   Injectable 5000 Unit(s) SubCutaneous every 12 hours    ceftolozane/tazobactam IVPB 450 milliGRAM(s) IV Intermittent every 8 hours      dextrose 50% Injectable 12.5 Gram(s) IV Push once  dextrose 50% Injectable 25 Gram(s) IV Push once  dextrose 50% Injectable 25 Gram(s) IV Push once  dextrose Oral Gel 15 Gram(s) Oral once PRN  glucagon  Injectable 1 milliGRAM(s) IntraMuscular once  insulin glargine Injectable (LANTUS) 15 Unit(s) SubCutaneous at bedtime  insulin lispro (ADMELOG) corrective regimen sliding scale   SubCutaneous every 6 hours    albuterol/ipratropium for Nebulization 3 milliLiter(s) Nebulizer every 6 hours  sodium chloride 3%  Inhalation 4 milliLiter(s) Inhalation every 6 hours    acetaminophen   Oral Liquid .. 650 milliGRAM(s) Oral every 6 hours PRN    pantoprazole   Suspension 40 milliGRAM(s) Enteral Tube daily        dextrose 5%. 1000 milliLiter(s) IV Continuous <Continuous>  dextrose 5%. 1000 milliLiter(s) IV Continuous <Continuous>  ferrous    sulfate Liquid 300 milliGRAM(s) Enteral Tube daily  Nephro-noam 1 Tablet(s) Oral daily    epoetin reilly-epbx (RETACRIT) Injectable 49473 Unit(s) IV Push <User Schedule>    chlorhexidine 0.12% Liquid 15 milliLiter(s) Oral Mucosa every 12 hours  chlorhexidine 2% Cloths 1 Application(s) Topical <User Schedule>  nystatin Powder 1 Application(s) Topical every 8 hours        LABS:                        8.8    13.79 )-----------( 254      ( 29 Oct 2024 03:00 )             27.5     Hgb Trend: 8.8<--, 9.1<--, 8.6<--, 6.7<--, 7.0<--  10-29    133[L]  |  95[L]  |  80[H]  ----------------------------<  270[H]  4.2   |  25  |  5.08[H]    Ca    9.1      29 Oct 2024 03:00  Phos  5.9     10-29  Mg     2.4     10-29    TPro  7.2  /  Alb  1.7[L]  /  TBili  0.3  /  DBili  x   /  AST  29  /  ALT  17  /  AlkPhos  160[H]  10-28    Creatinine Trend: 5.08<--, 4.21<--, 3.00<--, 4.04<--, 2.81<--, 4.22<--    Urinalysis Basic - ( 29 Oct 2024 03:00 )    Color: x / Appearance: x / SG: x / pH: x  Gluc: 270 mg/dL / Ketone: x  / Bili: x / Urobili: x   Blood: x / Protein: x / Nitrite: x   Leuk Esterase: x / RBC: x / WBC x   Sq Epi: x / Non Sq Epi: x / Bacteria: x            MICROBIOLOGY:     Culture - Blood (collected 10-25-24 @ 16:55)  Source: .Blood BLOOD  Preliminary Report (10-27-24 @ 23:00):    No growth at 48 Hours    Culture - Blood (collected 10-25-24 @ 16:55)  Source: .Blood BLOOD  Preliminary Report (10-27-24 @ 23:00):    No growth at 48 Hours    Culture - Blood (collected 10-18-24 @ 11:10)  Source: .Blood BLOOD  Final Report (10-23-24 @ 15:01):    No growth at 5 days    Culture - Blood (collected 10-07-24 @ 18:35)  Source: .Blood BLOOD  Final Report (10-13-24 @ 01:01):    No growth at 5 days    Culture - Blood (collected 10-07-24 @ 18:30)  Source: .Blood BLOOD  Final Report (10-13-24 @ 01:01):    No growth at 5 days    Culture - Blood (collected 10-04-24 @ 16:40)  Source: .Blood BLOOD  Final Report (10-10-24 @ 02:00):    No growth at 5 days    Culture - Blood (collected 10-04-24 @ 16:30)  Source: .Blood BLOOD  Final Report (10-10-24 @ 02:00):    No growth at 5 days        Culture - Sputum (collected 10-25-24 @ 17:30)  Source: Trach Asp Tracheal Aspirate  Gram Stain (10-27-24 @ 17:35):    Few polymorphonuclear leukocytes per low power field    Rare Squamous epithelial cells per low power field    Few Gram Negative Rods per oil power field  Final Report (10-27-24 @ 17:35):    Moderate Pseudomonas aeruginosa (Carbapenem Resistant)    Commensal dominick consistent with body site  Organism: Pseudomonas aeruginosa (Carbapenem Resistant) (10-27-24 @ 17:35)  Organism: Pseudomonas aeruginosa (Carbapenem Resistant) (10-27-24 @ 17:35)      Method Type: CarbaR      -  Resistance Gene to Carbapenem: Nondet  Organism: Pseudomonas aeruginosa (Carbapenem Resistant) (10-27-24 @ 17:35)      Method Type: VIDA      -  Aztreonam: R >16      -  Cefepime: I 16      -  Ceftazidime: R >16      -  Ceftazidime/Avibactam: S <=4      -  Ceftolozane/tazobactam: S 4      -  Ciprofloxacin: R >2      -  Imipenem: R 8      -  Levofloxacin: R >4      -  Meropenem: I 4      -  Piperacillin/Tazobactam: R >64        RADIOLOGY & ADDITIONAL TESTS: Reviewed.     Interval Events: No acute events. Tolerates PS during day, full ac overnight.     REVIEW OF SYSTEMS:  All negative unless listed within interval HPI       OBJECTIVE:  Vital Signs Last 24 Hrs  T(C): 37.7 (29 Oct 2024 17:02), Max: 38.2 (29 Oct 2024 08:00)  T(F): 99.8 (29 Oct 2024 17:02), Max: 100.7 (29 Oct 2024 08:00)  HR: 107 (29 Oct 2024 16:15) (78 - 108)  BP: 124/66 (29 Oct 2024 16:00) (124/66 - 168/71)  BP(mean): 82 (29 Oct 2024 16:00) (81 - 102)  RR: 29 (29 Oct 2024 16:00) (16 - 29)  SpO2: 100% (29 Oct 2024 16:15) (88% - 100%)    O2 Parameters below as of 29 Oct 2024 15:45  Patient On (Oxygen Delivery Method): ventilator          Mode: AC/ CMV (Assist Control/ Continuous Mandatory Ventilation), RR (machine): 16, TV (machine): 400, FiO2: 30, PEEP: 5, ITime: 1, MAP: 9, PIP: 22    10-28 @ 07:01  -  10-29 @ 07:00  --------------------------------------------------------  IN: 1560 mL / OUT: 370 mL / NET: 1190 mL    10-29 @ 07:01  -  10-29 @ 17:18  --------------------------------------------------------  IN: 0 mL / OUT: 1500 mL / NET: -1500 mL      CAPILLARY BLOOD GLUCOSE  POCT Blood Glucose.: 101 mg/dL (29 Oct 2024 17:11)      PHYSICAL EXAM:  General: thin, cachectic, NAD  HEENT: Sclera clear, PERRL  Neck: +trach   Respiratory: CTA B/L no wheezing Cough stridor  Cardiovascular: S1S2 RRR  Abdomen: soft + BS   Extremities: no edema, THAI   Skin: no rash / breakdown  Neurological: awake, follows simple commands       HOSPITAL MEDICATIONS:  heparin   Injectable 5000 Unit(s) SubCutaneous every 12 hours    ceftolozane/tazobactam IVPB 450 milliGRAM(s) IV Intermittent every 8 hours      dextrose 50% Injectable 12.5 Gram(s) IV Push once  dextrose 50% Injectable 25 Gram(s) IV Push once  dextrose 50% Injectable 25 Gram(s) IV Push once  dextrose Oral Gel 15 Gram(s) Oral once PRN  glucagon  Injectable 1 milliGRAM(s) IntraMuscular once  insulin glargine Injectable (LANTUS) 15 Unit(s) SubCutaneous at bedtime  insulin lispro (ADMELOG) corrective regimen sliding scale   SubCutaneous every 6 hours    albuterol/ipratropium for Nebulization 3 milliLiter(s) Nebulizer every 6 hours  sodium chloride 3%  Inhalation 4 milliLiter(s) Inhalation every 6 hours    acetaminophen   Oral Liquid .. 650 milliGRAM(s) Oral every 6 hours PRN    pantoprazole   Suspension 40 milliGRAM(s) Enteral Tube daily        dextrose 5%. 1000 milliLiter(s) IV Continuous <Continuous>  dextrose 5%. 1000 milliLiter(s) IV Continuous <Continuous>  ferrous    sulfate Liquid 300 milliGRAM(s) Enteral Tube daily  Nephro-noam 1 Tablet(s) Oral daily    epoetin reilly-epbx (RETACRIT) Injectable 55347 Unit(s) IV Push <User Schedule>    chlorhexidine 0.12% Liquid 15 milliLiter(s) Oral Mucosa every 12 hours  chlorhexidine 2% Cloths 1 Application(s) Topical <User Schedule>  nystatin Powder 1 Application(s) Topical every 8 hours        LABS:                        8.8    13.79 )-----------( 254      ( 29 Oct 2024 03:00 )             27.5     Hgb Trend: 8.8<--, 9.1<--, 8.6<--, 6.7<--, 7.0<--  10-29    133[L]  |  95[L]  |  80[H]  ----------------------------<  270[H]  4.2   |  25  |  5.08[H]    Ca    9.1      29 Oct 2024 03:00  Phos  5.9     10-29  Mg     2.4     10-29    TPro  7.2  /  Alb  1.7[L]  /  TBili  0.3  /  DBili  x   /  AST  29  /  ALT  17  /  AlkPhos  160[H]  10-28    Creatinine Trend: 5.08<--, 4.21<--, 3.00<--, 4.04<--, 2.81<--, 4.22<--          MICROBIOLOGY:     Culture - Blood (collected 10-25-24 @ 16:55)  Source: .Blood BLOOD  Preliminary Report (10-27-24 @ 23:00):    No growth at 48 Hours    Culture - Blood (collected 10-25-24 @ 16:55)  Source: .Blood BLOOD  Preliminary Report (10-27-24 @ 23:00):    No growth at 48 Hours    Culture - Blood (collected 10-18-24 @ 11:10)  Source: .Blood BLOOD  Final Report (10-23-24 @ 15:01):    No growth at 5 days    Culture - Blood (collected 10-07-24 @ 18:35)  Source: .Blood BLOOD  Final Report (10-13-24 @ 01:01):    No growth at 5 days    Culture - Blood (collected 10-07-24 @ 18:30)  Source: .Blood BLOOD  Final Report (10-13-24 @ 01:01):    No growth at 5 days    Culture - Blood (collected 10-04-24 @ 16:40)  Source: .Blood BLOOD  Final Report (10-10-24 @ 02:00):    No growth at 5 days    Culture - Blood (collected 10-04-24 @ 16:30)  Source: .Blood BLOOD  Final Report (10-10-24 @ 02:00):    No growth at 5 days        Culture - Sputum (collected 10-25-24 @ 17:30)  Source: Trach Asp Tracheal Aspirate  Gram Stain (10-27-24 @ 17:35):    Few polymorphonuclear leukocytes per low power field    Rare Squamous epithelial cells per low power field    Few Gram Negative Rods per oil power field  Final Report (10-27-24 @ 17:35):    Moderate Pseudomonas aeruginosa (Carbapenem Resistant)    Commensal dominick consistent with body site  Organism: Pseudomonas aeruginosa (Carbapenem Resistant) (10-27-24 @ 17:35)  Organism: Pseudomonas aeruginosa (Carbapenem Resistant) (10-27-24 @ 17:35)      Method Type: CarbaR      -  Resistance Gene to Carbapenem: Nondet  Organism: Pseudomonas aeruginosa (Carbapenem Resistant) (10-27-24 @ 17:35)      Method Type: VIDA      -  Aztreonam: R >16      -  Cefepime: I 16      -  Ceftazidime: R >16      -  Ceftazidime/Avibactam: S <=4      -  Ceftolozane/tazobactam: S 4      -  Ciprofloxacin: R >2      -  Imipenem: R 8      -  Levofloxacin: R >4      -  Meropenem: I 4      -  Piperacillin/Tazobactam: R >64        RADIOLOGY & ADDITIONAL TESTS: Reviewed.  < from: Xray Chest 1 View- PORTABLE-Urgent (Xray Chest 1 View- PORTABLE-Urgent .) (10.23.24 @ 13:37) >  Tracheostomy tube in place. LEFT retrocardiac airspace consolidation   and/or atelectasis.

## 2024-10-29 NOTE — PROGRESS NOTE ADULT - NS ATTEND AMEND GEN_ALL_CORE FT
pt seen and examined at bedside    receiving HD at time of exam  tolerating pressure support 10/5, weaning  awake, tracks with eyes  follows simple command: squeezes hands  febrile overnight 100.5      73M PMH HTN, DM, ESRD on HD (RUE AVF), CVA x2 with residual R weakness and dysphagia s/p PEG and trach subsequently decannulated, hx RLE DVT presents with cough and sputum production. Febrile in ED with noted leukocytosis. CT chest showed RLL pneumonia. Course with worsening acute hypoxemic respiratory failure, RRTs for hypoxia. Upgraded to ICU 10/8 for worsening hypoxemia high flow -> BiPAP. Intubated 10/8 for hypoxia due to PNA and thick secretions. s/p bronchoscopy 10/8: BAL cx with pseudomonas and Stenotrophomonas. Weaned and extubated 10/10, failed extubation and reintubated for hypoxia 10/14 s/p repeat bronchoscopy with thick secretions retrieved cultures with pseudomonas. s/p trach 10/23. Downgraded to medical service 10/24. Course with fevers. Sputum cx 10/25 with carbapenem resistant pseudomonas.     DX: acute hypoxic respiratory failure requiring reintubation, failure to wean s/ps trach, gram negative PNA, carbapenem resistant pseudomonas infection, ESRD on HD, HTN pt seen and examined at bedside    receiving HD at time of exam  tolerating pressure support 10/5, weaning  awake, tracks with eyes  follows simple command: squeezes hands  febrile overnight 100.5      73M PMH HTN, DM, ESRD on HD (RUE AVF), CVA 5/2024 with residual R weakness and dysphagia s/p PEG (5/17/24) and trach (5/14/24) subsequently decannulated 7/5/24, R common iliac DVT (5/2024) s/p apixiban, COVID PNA 7/2024 with hypoxia requiring high flow (s/p treatment with remdesivir and dexamethasone, weaned off O2 supplementation) presents with cough and sputum production. Febrile in ED with noted leukocytosis. CT chest showed RLL pneumonia. Course with worsening acute hypoxemic respiratory failure, RRTs for hypoxia. Upgraded to ICU 10/8 for worsening hypoxemia high flow -> BiPAP. Intubated 10/8 for hypoxia due to PNA and thick secretions. s/p bronchoscopy 10/8: BAL cx with pseudomonas and Stenotrophomonas. Weaned and extubated 10/10, failed extubation and reintubated for hypoxia 10/14 s/p repeat bronchoscopy with thick secretions retrieved cultures with pseudomonas. s/p trach 10/23. Downgraded to medical service 10/24. Course with fevers. Sputum cx 10/25 with carbapenem resistant pseudomonas.     DX: acute hypoxic respiratory failure requiring intubation/reintubation, failure to wean s/p trach, gram negative PNA (pseudomonas and stenotrophomonas), carbapenem resistant pseudomonas infection, ESRD on HD, HTN, sepsis present on admission    - on zerbaxa (day #2) for carbapenem resistant pseudomonas PNA  - s/p levaquin 10/16 - 10/22 for pseudomonas/stenotrophomonas in bronch cx  - s/p bactrim 10/12 - 10/16 for stenotrophomonas  - s/p zosyn 10/4 - 10/8  - cont daily weaning with pressure support as tolerates. weaned for 7 hours today on pressure support 10/5  - rest on full vent support at night  - cont duonebs q6 hrs and hypersal nebs for impaired secretion mobilization  - HD today with 1.5L fluid removal  - s/p trach POD #6 (#7 portex in place)  - local trach care  - tracheal suctioning as needed  - DVT ppx: on heparin sc  - remainder of care per primary team

## 2024-10-29 NOTE — CHART NOTE - NSCHARTNOTEFT_GEN_A_CORE
Assessment:  Pt seen in ICU, on vent, on Nepro @ 50  via G-Tube x 18 hours.  Pt adm c diagnosis of pneumonia, ESRD on HD, acute respiratory failure, septic shock, fevers and leukocytosis, post tracheostomy, anemia, diarrhea(senna and Miralax stopped),   Pt c recurrent aspirations following extubation and required reintubation.  Pt is full code.  PMH include DVT, CVA, DM Type 2 on insulin, HTN, ESRD on HD, dysphagia, PEG, bolus feeding regimen, constipation.     Factors impacting intake: [ ] none [ ] nausea  [ ] vomiting [ x] diarrhea; loose BMs, but c improvement as per RN [ ] constipation  [ ]chewing problems [ ] swallowing issues  [ x] other: acute illness    Diet Prescription: Diet, NPO with Tube Feed:   Tube Feeding Modality: Gastrostomy  Nepro with Carb Steady  Total Volume for 24 Hours (mL): 900  Continuous  Starting Tube Feed Rate {mL per Hour}: 25  Increase Tube Feed Rate by (mL): 5     Every 12 hours  Until Goal Tube Feed Rate (mL per Hour): 50  Tube Feed Duration (in Hours): 18  Tube Feed Start Time: 17:00  Tube Feed Stop Time: 11:00  Liquid Protein Supplement     Qty per Day:  1 (10-23-24 @ 11:28)    Intake:   Nepro 900 ml, 1620 calories, 73 grams protein, 654 ml free water   + Liquid protein supplement 1 pkg=15 grams protein, 100 calories = total of 1720 calories, 88 grams protein daily    Current Weight: 10/29, 45 kg, 10/22, 40.4 kg, 10/16, 40.9 kg, 10/05, 43.5 kg (drug dose/adm wt.), wt. fluctuations c wt gain of 1.5 kg   % Weight Change: 3.4%  10/29-No edema noted    Pertinent Medications: MEDICATIONS  (STANDING):  albuterol/ipratropium for Nebulization 3 milliLiter(s) Nebulizer every 6 hours  ceftolozane/tazobactam IVPB 450 milliGRAM(s) IV Intermittent every 8 hours  chlorhexidine 0.12% Liquid 15 milliLiter(s) Oral Mucosa every 12 hours  chlorhexidine 2% Cloths 1 Application(s) Topical <User Schedule>  dextrose 5%. 1000 milliLiter(s) (50 mL/Hr) IV Continuous <Continuous>  dextrose 5%. 1000 milliLiter(s) (100 mL/Hr) IV Continuous <Continuous>  dextrose 50% Injectable 25 Gram(s) IV Push once  dextrose 50% Injectable 12.5 Gram(s) IV Push once  dextrose 50% Injectable 25 Gram(s) IV Push once  epoetin reilly-epbx (RETACRIT) Injectable 02994 Unit(s) IV Push <User Schedule>  ferrous    sulfate Liquid 300 milliGRAM(s) Enteral Tube daily  glucagon  Injectable 1 milliGRAM(s) IntraMuscular once  heparin   Injectable 5000 Unit(s) SubCutaneous every 12 hours  insulin glargine Injectable (LANTUS) 15 Unit(s) SubCutaneous at bedtime  insulin lispro (ADMELOG) corrective regimen sliding scale   SubCutaneous every 6 hours  Nephro-noam 1 Tablet(s) Oral daily  nystatin Powder 1 Application(s) Topical every 8 hours  pantoprazole   Suspension 40 milliGRAM(s) Enteral Tube daily  sodium chloride 3%  Inhalation 4 milliLiter(s) Inhalation every 6 hours    MEDICATIONS  (PRN):  acetaminophen   Oral Liquid .. 650 milliGRAM(s) Oral every 6 hours PRN Temp greater or equal to 38.5C (101.3F)  dextrose Oral Gel 15 Gram(s) Oral once PRN Blood Glucose LESS THAN 70 milliGRAM(s)/deciliter    Pertinent Labs: 10-29 Na133 mmol/L[L] Glu 270 mg/dL[H] K+ 4.2 mmol/L Cr  5.08 mg/dL[H] BUN 80 mg/dL[H] 10-29 Phos 5.9 mg/dL[H] 10-28 Alb 1.7 g/dL[L] 10-18 Chol --    LDL --    HDL --    Trig 231 mg/dL[H]10-28 ALT 17 U/L AST 29 U/L Alkaline Phosphatase 160 U/L[H]   CAPILLARY BLOOD GLUCOSE      POCT Blood Glucose.: 321 mg/dL (29 Oct 2024 12:10)  POCT Blood Glucose.: 292 mg/dL (29 Oct 2024 05:38)  POCT Blood Glucose.: 390 mg/dL (28 Oct 2024 23:21)  POCT Blood Glucose.: 361 mg/dL (28 Oct 2024 21:21)  POCT Blood Glucose.: 295 mg/dL (28 Oct 2024 17:10)    Skin:   10/22, sacrum; suspected deep tissue injury   10/23, left buttock;     Estimated Needs:   [ x] no change since previous assessment(10/06)  [ ] recalculated:       Previous New Nutrition Diagnosis: (10/22)  [x ] Malnutrition; severe malnutrition in context of acute illness   Related to: inadequate protein-energy intake in setting of acute respiratory failure, septic shock, pneumonia, pressure ulcer  As evidenced by: <50% nutrition needs > 5 days, >5% wt. loss in 1 month  Goal: pt to meet >75% protein-energy needs via enteral feeing; met  Nutrition Diagnosis is [x ] ongoing  [ ] resolved [ ] not applicable       New Nutrition Diagnosis: [ x] not applicable     Interventions:   Recommend  [ ] Change Diet To:  [ ] Nutrition Supplement  [ x] Nutrition Support; Continue c current enteral feeding regimen  [ x] Other: consider lactobacillus acidophilus for ABX/loose BMs    Monitoring and Evaluation:   [ ] PO intake [ x ] Tolerance to diet prescription [ x ] weights [ x ] labs[ x ] follow up per protocol  [ ] other:

## 2024-10-29 NOTE — PROGRESS NOTE ADULT - SUBJECTIVE AND OBJECTIVE BOX
JIMMY BLAND  MRN-[id number]  73y (1950)    Follow Up:   Pseudomonas in tracheal aspirate, fevers, multifocal pna     Interval History: The pt was seen and examined earlier, not in acute distress, pt is awake, getting hemodialysis , nods occasionally in response to my questions. was on pressure support and breathing well    PAST MEDICAL & SURGICAL HISTORY:  ESRD on hemodialysis      HTN (hypertension)      Insulin dependent type 2 diabetes mellitus      History of cerebrovascular accident (CVA) with residual deficit      History of DVT of lower extremity      Arteriovenous fistula      S/P percutaneous endoscopic gastrostomy (PEG) tube placement      History of tracheostomy          ROS:    [x ] Unobtainable because: vented   [ ] All other systems negative    Constitutional: no fever, no chills  Head: no trauma  Eyes: no vision changes, no eye pain  ENT:  no sore throat, no rhinorrhea  Cardiovascular:  no chest pain, no palpitation  Respiratory:  no SOB, no cough  GI:  no abd pain, no vomiting, no diarrhea  urinary: no dysuria, no hematuria, no flank pain  musculoskeletal:  no joint pain, no joint swelling  skin:  no rash  neurology:  no headache, no seizure, no change in mental status  psych: no anxiety, no depression         Allergies  No Known Allergies        ANTIMICROBIALS:    ceftolozane/tazobactam IVPB 450 every 8 hours  epoetin reilly-epbx (RETACRIT) Injectable 04081 <User Schedule>      MEDICATIONS  (STANDING):  albuterol/ipratropium for Nebulization 3 every 6 hours  dextrose 50% Injectable 25 once  dextrose 50% Injectable 12.5 once  dextrose 50% Injectable 25 once  epoetin reilly-epbx (RETACRIT) Injectable 28063 <User Schedule>  glucagon  Injectable 1 once  heparin   Injectable 5000 every 12 hours  insulin glargine Injectable (LANTUS) 15 at bedtime  insulin lispro (ADMELOG) corrective regimen sliding scale  every 6 hours  pantoprazole   Suspension 40 daily  sodium chloride 3%  Inhalation 4 every 6 hours      PRN  acetaminophen   Oral Liquid .. 650 milliGRAM(s) Oral every 6 hours PRN  dextrose Oral Gel 15 Gram(s) Oral once PRN      Physical Exam:  Vital Signs Last 24 Hrs  T(F): 99.5 (10-29-24 @ 12:15), Max: 100.7 (10-29-24 @ 08:00)  HR: 97 (10-29-24 @ 12:19)  BP: 163/77 (10-29-24 @ 12:15)  RR: 25 (10-29-24 @ 12:15)  SpO2: 100% (10-29-24 @ 12:19) (98% - 100%)  Wt(kg): --    Physical Exam:  General: Chronic ill appearing elderly man  HEENT: trached and on the vent   Cardio:  tachycardic today, S1, S2,  no murmur  Respiratory:  Breath sounds largely clear today bilaterally, on vent  abd:  soft,   BS +,   no tenderness, + peg    Musculoskeletal:   no joint swelling,   no edema  Lines: +PIV, RUE AV fistula , rectal tube  Skin: warm dry skin  Neurologic: awake, at times follows with his eyes, nods at times, squeezes hand when asked  psych: unable     CBC  WBC Count: 13.79 K/uL (10-29 @ 03:00)  WBC Count: 10.57 K/uL (10-28 @ 02:32)  WBC Count: 12.19 K/uL (10-27 @ 02:29)  WBC Count: 14.27 K/uL (10-26 @ 04:27)  WBC Count: 14.89 K/uL (10-26 @ 02:15)  WBC Count: 15.99 K/uL (10-25 @ 02:40)  WBC Count: 15.68 K/uL (10-24 @ 02:05)                            8.8    13.79 )-----------( 254      ( 29 Oct 2024 03:00 )             27.5       BMP/CMP  10-29    133[L]  |  95[L]  |  80[H]  ----------------------------<  270[H]  4.2   |  25  |  5.08[H]    Ca    9.1      29 Oct 2024 03:00  Phos  5.9     10-29  Mg     2.4     10-29    TPro  7.2  /  Alb  1.7[L]  /  TBili  0.3  /  DBili  x   /  AST  29  /  ALT  17  /  AlkPhos  160[H]  10-28      Creatinine Trend: 5.08<--, 4.21<--, 3.00<--, 4.04<--, 2.81<--, 4.22<--      MICROBIOLOGY:    .Blood Blood  10-25-24   Culture is being performed.  --  --      Trach Asp Tracheal Aspirate  10-25-24   Moderate Pseudomonas aeruginosa (Carbapenem Resistant)  Commensal dominick consistent with body site  --  Pseudomonas aeruginosa (Carbapenem Resistant)      .Blood BLOOD  10-25-24   No growth at 48 Hours  --  --      .Blood BLOOD  10-18-24   No growth at 5 days  --  --      Bronchial  10-14-24   No acid-fast bacilli isolated at 1 week. ****Acid-fast cultures are held  for 6 weeks.****  --  Pseudomonas aeruginosa      Bronchial  10-08-24   Few Pseudomonas aeruginosa  Moderate Stenotrophomonas maltophilia  --  Pseudomonas aeruginosa  Stenotrophomonas maltophilia      .Stool  10-08-24   No enteric pathogens isolated.  (Stool culture examined for Salmonella,  Shigella, Campylobacter, Aeromonas, Plesiomonas,  Vibrio, E.coli O157 and Yersinia)  --  --      .Blood BLOOD  10-07-24   No growth at 5 days  --  --      .Blood BLOOD  10-07-24   No growth at 5 days  --  --      .Blood BLOOD  10-04-24   No growth at 5 days  --  --      .Blood BLOOD  10-04-24   No growth at 5 days  --  --          Rapid RVP Result: NotDetec (10-25 @ 16:55)      SARS-CoV-2: NotDetec (10-25-24 @ 16:55)  Rapid RVP Result: NotDetec (10-25-24 @ 16:55)    SARS-CoV-2: NotDetec (25 Oct 2024 16:55)  SARS-CoV-2: NotDetec (11 Oct 2024 14:45)  SARS-CoV-2: NotDetec (08 Oct 2024 10:00)  COVID-19 PCR: Detected (09 Jul 2024 13:43)  COVID-19 PCR: NotDetec (07 Jul 2024 04:30)  SARS-CoV-2: NotDetec (29 Jun 2024 22:30)  COVID-19 PCR: NotDetec (14 Jun 2024 18:56)    RADIOLOGY:

## 2024-10-29 NOTE — PROGRESS NOTE ADULT - ASSESSMENT
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 The patient was admitted w/ Septic shock POA due to RLL PNA with course complicated by acute hypoxic respiratory failure ultimately requiring admission to the ICU for intubation on 10/8. The pt failed trial of extubation and was subsequently reintubated on 10/14 & trached on 10/23. Pt was downgraded to the medical floors on 10/24.      Fevers & leukocytosis in patient status post  Multifocal PNA treatment   - patient is status post course of zosyn and Levaquin for resistant pseudomonas PNA  - as per ID, will watch off antibiotics   - RVP negative  - patient has had increased secretions and sputum cx grew now carbapenem resistant pseudomonas in sputum  - ID has started Zerbaxa    Resp failure status post tracheostomy  - c/w mechanical ventilation  - Pulmonary follow     - c/w Duoneb and hypertonic saline   - continue aggressive pulm toileting modalities with chest PT  - Trach to vent; appears to tolerate SBT at times  - being done daily     Anemia  - c/w Epo and ferrous sulfate   - status post 1 unit pRBC 10/26  - check FOBT     Severe protein-calorie malnutrition and Underweight (BMI < 19)  - c/w PEG feeds     DM   - c/w ISS    Constipation  - c/w Senna and Miralax stopped due to Diarrhea     ESRD  - c/w HD   - c/w Nephro-Kavon     Prophylaxis:  DVT: Heparin  GI: Protonix     Called the pt's wife and updated her about the pt's new infection and restarting antibiotics.

## 2024-10-30 LAB
ANION GAP SERPL CALC-SCNC: 11 MMOL/L — SIGNIFICANT CHANGE UP (ref 5–17)
BUN SERPL-MCNC: 51 MG/DL — HIGH (ref 7–23)
CALCIUM SERPL-MCNC: 9 MG/DL — SIGNIFICANT CHANGE UP (ref 8.5–10.1)
CHLORIDE SERPL-SCNC: 98 MMOL/L — SIGNIFICANT CHANGE UP (ref 96–108)
CO2 SERPL-SCNC: 26 MMOL/L — SIGNIFICANT CHANGE UP (ref 22–31)
CREAT SERPL-MCNC: 3.26 MG/DL — HIGH (ref 0.5–1.3)
CULTURE RESULTS: SIGNIFICANT CHANGE UP
CULTURE RESULTS: SIGNIFICANT CHANGE UP
EGFR: 19 ML/MIN/1.73M2 — LOW
GLUCOSE BLDC GLUCOMTR-MCNC: 128 MG/DL — HIGH (ref 70–99)
GLUCOSE BLDC GLUCOMTR-MCNC: 171 MG/DL — HIGH (ref 70–99)
GLUCOSE BLDC GLUCOMTR-MCNC: 216 MG/DL — HIGH (ref 70–99)
GLUCOSE BLDC GLUCOMTR-MCNC: 311 MG/DL — HIGH (ref 70–99)
GLUCOSE SERPL-MCNC: 225 MG/DL — HIGH (ref 70–99)
HCT VFR BLD CALC: 27.5 % — LOW (ref 39–50)
HGB BLD-MCNC: 8.7 G/DL — LOW (ref 13–17)
MAGNESIUM SERPL-MCNC: 2.3 MG/DL — SIGNIFICANT CHANGE UP (ref 1.6–2.6)
MCHC RBC-ENTMCNC: 24.4 PG — LOW (ref 27–34)
MCHC RBC-ENTMCNC: 31.6 G/DL — LOW (ref 32–36)
MCV RBC AUTO: 77.2 FL — LOW (ref 80–100)
NRBC # BLD: 0 /100 WBCS — SIGNIFICANT CHANGE UP (ref 0–0)
PHOSPHATE SERPL-MCNC: 3.5 MG/DL — SIGNIFICANT CHANGE UP (ref 2.5–4.5)
PLATELET # BLD AUTO: 255 K/UL — SIGNIFICANT CHANGE UP (ref 150–400)
POTASSIUM SERPL-MCNC: 3.8 MMOL/L — SIGNIFICANT CHANGE UP (ref 3.5–5.3)
POTASSIUM SERPL-SCNC: 3.8 MMOL/L — SIGNIFICANT CHANGE UP (ref 3.5–5.3)
RBC # BLD: 3.56 M/UL — LOW (ref 4.2–5.8)
RBC # FLD: 23.1 % — HIGH (ref 10.3–14.5)
SODIUM SERPL-SCNC: 135 MMOL/L — SIGNIFICANT CHANGE UP (ref 135–145)
SPECIMEN SOURCE: SIGNIFICANT CHANGE UP
SPECIMEN SOURCE: SIGNIFICANT CHANGE UP
WBC # BLD: 13.68 K/UL — HIGH (ref 3.8–10.5)
WBC # FLD AUTO: 13.68 K/UL — HIGH (ref 3.8–10.5)

## 2024-10-30 PROCEDURE — 99233 SBSQ HOSP IP/OBS HIGH 50: CPT

## 2024-10-30 PROCEDURE — 99232 SBSQ HOSP IP/OBS MODERATE 35: CPT

## 2024-10-30 RX ADMIN — Medication 1 TABLET(S): at 12:06

## 2024-10-30 RX ADMIN — Medication 4: at 06:03

## 2024-10-30 RX ADMIN — IPRATROPIUM BROMIDE AND ALBUTEROL SULFATE 3 MILLILITER(S): .5; 2.5 SOLUTION RESPIRATORY (INHALATION) at 17:13

## 2024-10-30 RX ADMIN — SODIUM CHLORIDE 4 MILLILITER(S): 9 INJECTION, SOLUTION INTRAMUSCULAR; INTRAVENOUS; SUBCUTANEOUS at 17:13

## 2024-10-30 RX ADMIN — IPRATROPIUM BROMIDE AND ALBUTEROL SULFATE 3 MILLILITER(S): .5; 2.5 SOLUTION RESPIRATORY (INHALATION) at 11:54

## 2024-10-30 RX ADMIN — HEPARIN SODIUM 5000 UNIT(S): 10000 INJECTION INTRAVENOUS; SUBCUTANEOUS at 17:39

## 2024-10-30 RX ADMIN — NYSTATIN 1 APPLICATION(S): 100000 POWDER TOPICAL at 05:17

## 2024-10-30 RX ADMIN — PANTOPRAZOLE SODIUM 40 MILLIGRAM(S): 40 TABLET, DELAYED RELEASE ORAL at 12:06

## 2024-10-30 RX ADMIN — NYSTATIN 1 APPLICATION(S): 100000 POWDER TOPICAL at 23:10

## 2024-10-30 RX ADMIN — CEFTOLOZANE AND TAZOBACTAM 33.33 MILLIGRAM(S): 1; .5 INJECTION, POWDER, LYOPHILIZED, FOR SOLUTION INTRAVENOUS at 08:00

## 2024-10-30 RX ADMIN — Medication 2: at 12:05

## 2024-10-30 RX ADMIN — IPRATROPIUM BROMIDE AND ALBUTEROL SULFATE 3 MILLILITER(S): .5; 2.5 SOLUTION RESPIRATORY (INHALATION) at 05:04

## 2024-10-30 RX ADMIN — CHLORHEXIDINE GLUCONATE 1 APPLICATION(S): 40 SOLUTION TOPICAL at 05:15

## 2024-10-30 RX ADMIN — CHLORHEXIDINE GLUCONATE 15 MILLILITER(S): 40 SOLUTION TOPICAL at 05:15

## 2024-10-30 RX ADMIN — IPRATROPIUM BROMIDE AND ALBUTEROL SULFATE 3 MILLILITER(S): .5; 2.5 SOLUTION RESPIRATORY (INHALATION) at 23:27

## 2024-10-30 RX ADMIN — Medication 300 MILLIGRAM(S): at 12:05

## 2024-10-30 RX ADMIN — CHLORHEXIDINE GLUCONATE 15 MILLILITER(S): 40 SOLUTION TOPICAL at 17:39

## 2024-10-30 RX ADMIN — Medication 1 TABLET(S): at 13:24

## 2024-10-30 RX ADMIN — CEFTOLOZANE AND TAZOBACTAM 33.33 MILLIGRAM(S): 1; .5 INJECTION, POWDER, LYOPHILIZED, FOR SOLUTION INTRAVENOUS at 23:11

## 2024-10-30 RX ADMIN — NYSTATIN 1 APPLICATION(S): 100000 POWDER TOPICAL at 13:25

## 2024-10-30 RX ADMIN — SODIUM CHLORIDE 4 MILLILITER(S): 9 INJECTION, SOLUTION INTRAMUSCULAR; INTRAVENOUS; SUBCUTANEOUS at 11:54

## 2024-10-30 RX ADMIN — SODIUM CHLORIDE 4 MILLILITER(S): 9 INJECTION, SOLUTION INTRAMUSCULAR; INTRAVENOUS; SUBCUTANEOUS at 05:05

## 2024-10-30 RX ADMIN — Medication 15 UNIT(S): at 23:10

## 2024-10-30 RX ADMIN — CEFTOLOZANE AND TAZOBACTAM 33.33 MILLIGRAM(S): 1; .5 INJECTION, POWDER, LYOPHILIZED, FOR SOLUTION INTRAVENOUS at 01:00

## 2024-10-30 RX ADMIN — Medication 8: at 23:10

## 2024-10-30 RX ADMIN — CEFTOLOZANE AND TAZOBACTAM 33.33 MILLIGRAM(S): 1; .5 INJECTION, POWDER, LYOPHILIZED, FOR SOLUTION INTRAVENOUS at 16:02

## 2024-10-30 RX ADMIN — HEPARIN SODIUM 5000 UNIT(S): 10000 INJECTION INTRAVENOUS; SUBCUTANEOUS at 05:16

## 2024-10-30 NOTE — PROGRESS NOTE ADULT - ASSESSMENT
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 The patient was admitted w/ Septic shock POA due to RLL PNA with course complicated by acute hypoxic respiratory failure ultimately requiring admission to the ICU for intubation on 10/8. The pt failed trial of extubation and was subsequently reintubated on 10/14 & trached on 10/23. Pt was downgraded to the medical floors on 10/24.      Fevers & leukocytosis in patient status post  Multifocal PNA treatment   - patient is status post course of zosyn and Levaquin for resistant pseudomonas PNA  - RVP negative  - patient has had increased secretions and sputum cx grew now carbapenem resistant pseudomonas in sputum  - ID has started Zerbaxa    Resp failure status post tracheostomy  - c/w mechanical ventilation  - Pulmonary follow     - c/w Duoneb and hypertonic saline   - continue aggressive pulm toileting modalities with chest PT  - Trach to vent; appears to tolerate SBT at times  - being done daily     Anemia  - c/w Epo and ferrous sulfate   - status post 1 unit pRBC 10/26  - check FOBT     Severe protein-calorie malnutrition and Underweight (BMI < 19)  - c/w PEG feeds     DM   - c/w ISS    Constipation  - c/w Senna and Miralax stopped due to Diarrhea     ESRD  - c/w HD   - c/w Nephro-Kavon     Prophylaxis:  DVT: Heparin  GI: Protonix

## 2024-10-30 NOTE — PROGRESS NOTE ADULT - SUBJECTIVE AND OBJECTIVE BOX
INTERVAL HPI/OVERNIGHT EVENTS: no acute events overnight. remains on full support at night     SUBJECTIVE: Patient seen and examined at bedside.     ROS: All negative except as listed above.    VITAL SIGNS:  ICU Vital Signs Last 24 Hrs  T(C): 37.2 (30 Oct 2024 04:05), Max: 37.7 (29 Oct 2024 17:02)  T(F): 99 (30 Oct 2024 04:05), Max: 99.8 (29 Oct 2024 17:02)  HR: 79 (30 Oct 2024 06:15) (79 - 108)  BP: 140/76 (30 Oct 2024 04:05) (97/57 - 163/77)  BP(mean): 91 (30 Oct 2024 04:05) (66 - 100)  ABP: --  ABP(mean): --  RR: 20 (30 Oct 2024 04:05) (18 - 30)  SpO2: 100% (30 Oct 2024 06:15) (88% - 100%)    O2 Parameters below as of 29 Oct 2024 17:46  Patient On (Oxygen Delivery Method): ventilator          Mode: CPAP with PS, FiO2: 30, PEEP: 5, PS: 10, MAP: 8      10-29 @ 07:01  -  10-30 @ 07:00  --------------------------------------------------------  IN: 50 mL / OUT: 1500 mL / NET: -1450 mL      CAPILLARY BLOOD GLUCOSE      POCT Blood Glucose.: 216 mg/dL (30 Oct 2024 06:02)      ECG: reviewed.    PHYSICAL EXAM:    General: well appearing, NAD  HEENT: Sclera clear, PERRL  Neck: +trach   Respiratory: CTA B/L no wheezing Cough stridor  Cardiovascular: S1S2 RRR  Abdomen: soft + BS   Extremities: no edema, THAI   Skin: no rash / breakdown  Neurological: no focal deficits     MEDICATIONS:  MEDICATIONS  (STANDING):  albuterol/ipratropium for Nebulization 3 milliLiter(s) Nebulizer every 6 hours  ceftolozane/tazobactam IVPB 450 milliGRAM(s) IV Intermittent every 8 hours  chlorhexidine 0.12% Liquid 15 milliLiter(s) Oral Mucosa every 12 hours  chlorhexidine 2% Cloths 1 Application(s) Topical <User Schedule>  dextrose 5%. 1000 milliLiter(s) (50 mL/Hr) IV Continuous <Continuous>  dextrose 5%. 1000 milliLiter(s) (100 mL/Hr) IV Continuous <Continuous>  dextrose 50% Injectable 12.5 Gram(s) IV Push once  dextrose 50% Injectable 25 Gram(s) IV Push once  dextrose 50% Injectable 25 Gram(s) IV Push once  epoetin reilly-epbx (RETACRIT) Injectable 76275 Unit(s) IV Push <User Schedule>  ferrous    sulfate Liquid 300 milliGRAM(s) Enteral Tube daily  glucagon  Injectable 1 milliGRAM(s) IntraMuscular once  heparin   Injectable 5000 Unit(s) SubCutaneous every 12 hours  insulin glargine Injectable (LANTUS) 15 Unit(s) SubCutaneous at bedtime  insulin lispro (ADMELOG) corrective regimen sliding scale   SubCutaneous every 6 hours  lactobacillus acidophilus 1 Tablet(s) Oral daily  Nephro-noam 1 Tablet(s) Oral daily  nystatin Powder 1 Application(s) Topical every 8 hours  pantoprazole   Suspension 40 milliGRAM(s) Enteral Tube daily  sodium chloride 3%  Inhalation 4 milliLiter(s) Inhalation every 6 hours    MEDICATIONS  (PRN):  acetaminophen   Oral Liquid .. 650 milliGRAM(s) Oral every 6 hours PRN Temp greater or equal to 38.5C (101.3F)  dextrose Oral Gel 15 Gram(s) Oral once PRN Blood Glucose LESS THAN 70 milliGRAM(s)/deciliter      ALLERGIES:  Allergies    No Known Allergies    Intolerances        LABS:                        8.7    13.68 )-----------( 255      ( 30 Oct 2024 02:40 )             27.5     10-30    135  |  98  |  51[H]  ----------------------------<  225[H]  3.8   |  26  |  3.26[H]    Ca    9.0      30 Oct 2024 02:40  Phos  3.5     10-30  Mg     2.3     10-30        Urinalysis Basic - ( 30 Oct 2024 02:40 )    Color: x / Appearance: x / SG: x / pH: x  Gluc: 225 mg/dL / Ketone: x  / Bili: x / Urobili: x   Blood: x / Protein: x / Nitrite: x   Leuk Esterase: x / RBC: x / WBC x   Sq Epi: x / Non Sq Epi: x / Bacteria: x      ABG:      vBG:    Micro:    Culture - Blood (collected 10-28-24 @ 15:00)  Source: .Blood BLOOD  Preliminary Report (10-30-24 @ 01:01):    No growth at 24 hours    Culture - Blood (collected 10-28-24 @ 15:00)  Source: .Blood BLOOD  Preliminary Report (10-30-24 @ 01:01):    No growth at 24 hours    Culture - Blood (collected 10-25-24 @ 16:55)  Source: .Blood BLOOD  Preliminary Report (10-29-24 @ 23:00):    No growth at 4 days    Culture - Blood (collected 10-25-24 @ 16:55)  Source: .Blood BLOOD  Preliminary Report (10-29-24 @ 23:00):    No growth at 4 days    Culture - Blood (collected 10-18-24 @ 11:10)  Source: .Blood BLOOD  Final Report (10-23-24 @ 15:01):    No growth at 5 days    Culture - Blood (collected 10-07-24 @ 18:35)  Source: .Blood BLOOD  Final Report (10-13-24 @ 01:01):    No growth at 5 days    Culture - Blood (collected 10-07-24 @ 18:30)  Source: .Blood BLOOD  Final Report (10-13-24 @ 01:01):    No growth at 5 days    Culture - Blood (collected 10-04-24 @ 16:40)  Source: .Blood BLOOD  Final Report (10-10-24 @ 02:00):    No growth at 5 days    Culture - Blood (collected 10-04-24 @ 16:30)  Source: .Blood BLOOD  Final Report (10-10-24 @ 02:00):    No growth at 5 days        Culture - Sputum (collected 10-25-24 @ 17:30)  Source: Trach Asp Tracheal Aspirate  Gram Stain (10-27-24 @ 17:35):    Few polymorphonuclear leukocytes per low power field    Rare Squamous epithelial cells per low power field    Few Gram Negative Rods per oil power field  Final Report (10-27-24 @ 17:35):    Moderate Pseudomonas aeruginosa (Carbapenem Resistant)    Commensal dominick consistent with body site  Organism: Pseudomonas aeruginosa (Carbapenem Resistant) (10-27-24 @ 17:35)  Organism: Pseudomonas aeruginosa (Carbapenem Resistant) (10-27-24 @ 17:35)      Method Type: CarbaR      -  Resistance Gene to Carbapenem: Nondet  Organism: Pseudomonas aeruginosa (Carbapenem Resistant) (10-27-24 @ 17:35)      Method Type: VIDA      -  Aztreonam: R >16      -  Cefepime: I 16      -  Ceftazidime: R >16      -  Ceftazidime/Avibactam: S <=4      -  Ceftolozane/tazobactam: S 4      -  Ciprofloxacin: R >2      -  Imipenem: R 8      -  Levofloxacin: R >4      -  Meropenem: I 4      -  Piperacillin/Tazobactam: R >64        RADIOLOGY & ADDITIONAL TESTS: Reviewed.   INTERVAL HPI/OVERNIGHT EVENTS: no acute events overnight. remains on full support at night     SUBJECTIVE: Patient seen and examined at bedside. PT satting well on 30% FiO2 on full vent support    ROS:  unable to assess     VITAL SIGNS:  ICU Vital Signs Last 24 Hrs  T(C): 37.2 (30 Oct 2024 04:05), Max: 37.7 (29 Oct 2024 17:02)  T(F): 99 (30 Oct 2024 04:05), Max: 99.8 (29 Oct 2024 17:02)  HR: 79 (30 Oct 2024 06:15) (79 - 108)  BP: 140/76 (30 Oct 2024 04:05) (97/57 - 163/77)  BP(mean): 91 (30 Oct 2024 04:05) (66 - 100)  ABP: --  ABP(mean): --  RR: 20 (30 Oct 2024 04:05) (18 - 30)  SpO2: 100% (30 Oct 2024 06:15) (88% - 100%)    O2 Parameters below as of 29 Oct 2024 17:46  Patient On (Oxygen Delivery Method): ventilator          Mode: CPAP with PS, FiO2: 30, PEEP: 5, PS: 10, MAP: 8      10-29 @ 07:01  -  10-30 @ 07:00  --------------------------------------------------------  IN: 50 mL / OUT: 1500 mL / NET: -1450 mL      CAPILLARY BLOOD GLUCOSE      POCT Blood Glucose.: 216 mg/dL (30 Oct 2024 06:02)      ECG: reviewed.    PHYSICAL EXAM:    General: well appearing, NAD  HEENT: Sclera clear, PERRL  Neck: +trach   Respiratory: mechanical BS bilat   Cardiovascular: S1S2 RRR  Abdomen: soft + BS   Extremities: warm, no LE edema   Neurological: arousable to voice, follows commands    MEDICATIONS:  MEDICATIONS  (STANDING):  albuterol/ipratropium for Nebulization 3 milliLiter(s) Nebulizer every 6 hours  ceftolozane/tazobactam IVPB 450 milliGRAM(s) IV Intermittent every 8 hours  chlorhexidine 0.12% Liquid 15 milliLiter(s) Oral Mucosa every 12 hours  chlorhexidine 2% Cloths 1 Application(s) Topical <User Schedule>  dextrose 5%. 1000 milliLiter(s) (50 mL/Hr) IV Continuous <Continuous>  dextrose 5%. 1000 milliLiter(s) (100 mL/Hr) IV Continuous <Continuous>  dextrose 50% Injectable 12.5 Gram(s) IV Push once  dextrose 50% Injectable 25 Gram(s) IV Push once  dextrose 50% Injectable 25 Gram(s) IV Push once  epoetin reilly-epbx (RETACRIT) Injectable 12652 Unit(s) IV Push <User Schedule>  ferrous    sulfate Liquid 300 milliGRAM(s) Enteral Tube daily  glucagon  Injectable 1 milliGRAM(s) IntraMuscular once  heparin   Injectable 5000 Unit(s) SubCutaneous every 12 hours  insulin glargine Injectable (LANTUS) 15 Unit(s) SubCutaneous at bedtime  insulin lispro (ADMELOG) corrective regimen sliding scale   SubCutaneous every 6 hours  lactobacillus acidophilus 1 Tablet(s) Oral daily  Nephro-noam 1 Tablet(s) Oral daily  nystatin Powder 1 Application(s) Topical every 8 hours  pantoprazole   Suspension 40 milliGRAM(s) Enteral Tube daily  sodium chloride 3%  Inhalation 4 milliLiter(s) Inhalation every 6 hours    MEDICATIONS  (PRN):  acetaminophen   Oral Liquid .. 650 milliGRAM(s) Oral every 6 hours PRN Temp greater or equal to 38.5C (101.3F)  dextrose Oral Gel 15 Gram(s) Oral once PRN Blood Glucose LESS THAN 70 milliGRAM(s)/deciliter      ALLERGIES:  Allergies    No Known Allergies    Intolerances        LABS:                        8.7    13.68 )-----------( 255      ( 30 Oct 2024 02:40 )             27.5     10-30    135  |  98  |  51[H]  ----------------------------<  225[H]  3.8   |  26  |  3.26[H]    Ca    9.0      30 Oct 2024 02:40  Phos  3.5     10-30  Mg     2.3     10-30        Urinalysis Basic - ( 30 Oct 2024 02:40 )    Color: x / Appearance: x / SG: x / pH: x  Gluc: 225 mg/dL / Ketone: x  / Bili: x / Urobili: x   Blood: x / Protein: x / Nitrite: x   Leuk Esterase: x / RBC: x / WBC x   Sq Epi: x / Non Sq Epi: x / Bacteria: x      ABG:      vBG:    Micro:    Culture - Blood (collected 10-28-24 @ 15:00)  Source: .Blood BLOOD  Preliminary Report (10-30-24 @ 01:01):    No growth at 24 hours    Culture - Blood (collected 10-28-24 @ 15:00)  Source: .Blood BLOOD  Preliminary Report (10-30-24 @ 01:01):    No growth at 24 hours    Culture - Blood (collected 10-25-24 @ 16:55)  Source: .Blood BLOOD  Preliminary Report (10-29-24 @ 23:00):    No growth at 4 days    Culture - Blood (collected 10-25-24 @ 16:55)  Source: .Blood BLOOD  Preliminary Report (10-29-24 @ 23:00):    No growth at 4 days    Culture - Blood (collected 10-18-24 @ 11:10)  Source: .Blood BLOOD  Final Report (10-23-24 @ 15:01):    No growth at 5 days    Culture - Blood (collected 10-07-24 @ 18:35)  Source: .Blood BLOOD  Final Report (10-13-24 @ 01:01):    No growth at 5 days    Culture - Blood (collected 10-07-24 @ 18:30)  Source: .Blood BLOOD  Final Report (10-13-24 @ 01:01):    No growth at 5 days    Culture - Blood (collected 10-04-24 @ 16:40)  Source: .Blood BLOOD  Final Report (10-10-24 @ 02:00):    No growth at 5 days    Culture - Blood (collected 10-04-24 @ 16:30)  Source: .Blood BLOOD  Final Report (10-10-24 @ 02:00):    No growth at 5 days        Culture - Sputum (collected 10-25-24 @ 17:30)  Source: Trach Asp Tracheal Aspirate  Gram Stain (10-27-24 @ 17:35):    Few polymorphonuclear leukocytes per low power field    Rare Squamous epithelial cells per low power field    Few Gram Negative Rods per oil power field  Final Report (10-27-24 @ 17:35):    Moderate Pseudomonas aeruginosa (Carbapenem Resistant)    Commensal dominick consistent with body site  Organism: Pseudomonas aeruginosa (Carbapenem Resistant) (10-27-24 @ 17:35)  Organism: Pseudomonas aeruginosa (Carbapenem Resistant) (10-27-24 @ 17:35)      Method Type: CarbaR      -  Resistance Gene to Carbapenem: Nondet  Organism: Pseudomonas aeruginosa (Carbapenem Resistant) (10-27-24 @ 17:35)      Method Type: VIDA      -  Aztreonam: R >16      -  Cefepime: I 16      -  Ceftazidime: R >16      -  Ceftazidime/Avibactam: S <=4      -  Ceftolozane/tazobactam: S 4      -  Ciprofloxacin: R >2      -  Imipenem: R 8      -  Levofloxacin: R >4      -  Meropenem: I 4      -  Piperacillin/Tazobactam: R >64        RADIOLOGY & ADDITIONAL TESTS: Reviewed.   INTERVAL HPI/OVERNIGHT EVENTS: no acute events overnight. remains on full support at night     SUBJECTIVE: Patient seen and examined at bedside. PT satting well on 30% FiO2 on full vent support    ROS:  unable to assess     VITAL SIGNS:  Vital Signs Last 24 Hrs  T(C): 37.2 (30 Oct 2024 04:05), Max: 37.7 (29 Oct 2024 17:02)  T(F): 99 (30 Oct 2024 04:05), Max: 99.8 (29 Oct 2024 17:02)  HR: 79 (30 Oct 2024 06:15) (79 - 108)  BP: 140/76 (30 Oct 2024 04:05) (97/57 - 163/77)  BP(mean): 91 (30 Oct 2024 04:05) (66 - 100)  RR: 20 (30 Oct 2024 04:05) (18 - 30)  SpO2: 100% (30 Oct 2024 06:15) (88% - 100%)    O2 Parameters below as of 29 Oct 2024 17:46  Patient On (Oxygen Delivery Method): ventilator          Mode: CPAP with PS, FiO2: 30, PEEP: 5, PS: 10, MAP: 8      10-29 @ 07:01  -  10-30 @ 07:00  --------------------------------------------------------  IN: 50 mL / OUT: 1500 mL / NET: -1450 mL      CAPILLARY BLOOD GLUCOSE  POCT Blood Glucose.: 216 mg/dL (30 Oct 2024 06:02)          PHYSICAL EXAM:  General: thin cachectic, chronically ill appearing, NAD  HEENT: Sclera clear, PERRL  Neck: +trach   Respiratory: mechanical BS bilat   Cardiovascular: S1S2 RRR  Abdomen: soft + BS + PEG  Extremities: warm, no LE edema   Neurological: arousable to voice, follows commands      MEDICATIONS  (STANDING):  albuterol/ipratropium for Nebulization 3 milliLiter(s) Nebulizer every 6 hours  ceftolozane/tazobactam IVPB 450 milliGRAM(s) IV Intermittent every 8 hours  chlorhexidine 0.12% Liquid 15 milliLiter(s) Oral Mucosa every 12 hours  chlorhexidine 2% Cloths 1 Application(s) Topical <User Schedule>  dextrose 5%. 1000 milliLiter(s) (50 mL/Hr) IV Continuous <Continuous>  dextrose 5%. 1000 milliLiter(s) (100 mL/Hr) IV Continuous <Continuous>  dextrose 50% Injectable 12.5 Gram(s) IV Push once  dextrose 50% Injectable 25 Gram(s) IV Push once  dextrose 50% Injectable 25 Gram(s) IV Push once  epoetin reilly-epbx (RETACRIT) Injectable 68813 Unit(s) IV Push <User Schedule>  ferrous    sulfate Liquid 300 milliGRAM(s) Enteral Tube daily  glucagon  Injectable 1 milliGRAM(s) IntraMuscular once  heparin   Injectable 5000 Unit(s) SubCutaneous every 12 hours  insulin glargine Injectable (LANTUS) 15 Unit(s) SubCutaneous at bedtime  insulin lispro (ADMELOG) corrective regimen sliding scale   SubCutaneous every 6 hours  lactobacillus acidophilus 1 Tablet(s) Oral daily  Nephro-noam 1 Tablet(s) Oral daily  nystatin Powder 1 Application(s) Topical every 8 hours  pantoprazole   Suspension 40 milliGRAM(s) Enteral Tube daily  sodium chloride 3%  Inhalation 4 milliLiter(s) Inhalation every 6 hours    MEDICATIONS  (PRN):  acetaminophen   Oral Liquid .. 650 milliGRAM(s) Oral every 6 hours PRN Temp greater or equal to 38.5C (101.3F)  dextrose Oral Gel 15 Gram(s) Oral once PRN Blood Glucose LESS THAN 70 milliGRAM(s)/deciliter        Allergies  No Known Allergies            LABS:                        8.7    13.68 )-----------( 255      ( 30 Oct 2024 02:40 )             27.5     10-30    135  |  98  |  51[H]  ----------------------------<  225[H]  3.8   |  26  |  3.26[H]    Ca    9.0      30 Oct 2024 02:40  Phos  3.5     10-30  Mg     2.3     10-30            Micro:    Culture - Blood (collected 10-28-24 @ 15:00)  Source: .Blood BLOOD  Preliminary Report (10-30-24 @ 01:01):    No growth at 24 hours    Culture - Blood (collected 10-28-24 @ 15:00)  Source: .Blood BLOOD  Preliminary Report (10-30-24 @ 01:01):    No growth at 24 hours    Culture - Blood (collected 10-25-24 @ 16:55)  Source: .Blood BLOOD  Preliminary Report (10-29-24 @ 23:00):    No growth at 4 days    Culture - Blood (collected 10-25-24 @ 16:55)  Source: .Blood BLOOD  Preliminary Report (10-29-24 @ 23:00):    No growth at 4 days    Culture - Blood (collected 10-18-24 @ 11:10)  Source: .Blood BLOOD  Final Report (10-23-24 @ 15:01):    No growth at 5 days    Culture - Blood (collected 10-07-24 @ 18:35)  Source: .Blood BLOOD  Final Report (10-13-24 @ 01:01):    No growth at 5 days    Culture - Blood (collected 10-07-24 @ 18:30)  Source: .Blood BLOOD  Final Report (10-13-24 @ 01:01):    No growth at 5 days    Culture - Blood (collected 10-04-24 @ 16:40)  Source: .Blood BLOOD  Final Report (10-10-24 @ 02:00):    No growth at 5 days    Culture - Blood (collected 10-04-24 @ 16:30)  Source: .Blood BLOOD  Final Report (10-10-24 @ 02:00):    No growth at 5 days        Culture - Sputum (collected 10-25-24 @ 17:30)  Source: Trach Asp Tracheal Aspirate  Gram Stain (10-27-24 @ 17:35):    Few polymorphonuclear leukocytes per low power field    Rare Squamous epithelial cells per low power field    Few Gram Negative Rods per oil power field  Final Report (10-27-24 @ 17:35):    Moderate Pseudomonas aeruginosa (Carbapenem Resistant)    Commensal dominick consistent with body site  Organism: Pseudomonas aeruginosa (Carbapenem Resistant) (10-27-24 @ 17:35)  Organism: Pseudomonas aeruginosa (Carbapenem Resistant) (10-27-24 @ 17:35)      Method Type: CarbaR      -  Resistance Gene to Carbapenem: Nondet  Organism: Pseudomonas aeruginosa (Carbapenem Resistant) (10-27-24 @ 17:35)      Method Type: VIDA      -  Aztreonam: R >16      -  Cefepime: I 16      -  Ceftazidime: R >16      -  Ceftazidime/Avibactam: S <=4      -  Ceftolozane/tazobactam: S 4      -  Ciprofloxacin: R >2      -  Imipenem: R 8      -  Levofloxacin: R >4      -  Meropenem: I 4      -  Piperacillin/Tazobactam: R >64      Culture - Bronchial (10.14.24 @ 11:20)    -  Aztreonam: I 16   -  Cefepime: I 16   -  Ceftazidime: R >16   -  Piperacillin/Tazobactam: R 64   -  Imipenem: S 2   -  Levofloxacin: S <=0.5   -  Ciprofloxacin: S <=0.25   -  Meropenem: S <=1   Gram Stain:   Moderate polymorphonuclear leukocytes per low power field  Rare Squamous epithelial cells per low power field  Few Gram Negative Rods per oil power field   Specimen Source: Bronchial   Culture Results:   Few Pseudomonas aeruginosa  Commensal dominick consistent with body site   Organism Identification: Pseudomonas aeruginosa   Organism: Pseudomonas aeruginosa   Method Type: VIDA      Culture - Bronchial (10.08.24 @ 20:40)    -  Minocycline: S   -  Piperacillin/Tazobactam: S <=8   -  Trimethoprim/Sulfamethoxazole: S <=0.5/9.5   -  Imipenem: S 2   -  Ciprofloxacin: S <=0.25   -  Levofloxacin: S <=0.5   -  Levofloxacin: S <=0.5   -  Meropenem: S <=1   -  Aztreonam: S <=4   -  Cefepime: S <=2   -  Ceftazidime: S <=1   Gram Stain:   Moderate polymorphonuclear leukocytes seen per low power field  Few Squamous epithelial cells seen per low power field  Few Gram Negative Rods seen per oil power field   Specimen Source: Bronchial   Culture Results:   Few Pseudomonas aeruginosa  Moderate Stenotrophomonas maltophilia   Organism Identification: Pseudomonas aeruginosa  Stenotrophomonas maltophilia   Organism: Pseudomonas aeruginosa   Organism: Stenotrophomonas maltophilia   Organism: Stenotrophomonas maltophilia   Method Type: KB   Method Type: VIDA   Method Type: VIDA            RADIOLOGY & ADDITIONAL TESTS: Reviewed.  < from: Xray Chest 1 View- PORTABLE-Urgent (Xray Chest 1 View- PORTABLE-Urgent .) (10.23.24 @ 13:37) >  IMPRESSION:  Tracheostomy tube in place. LEFT retrocardiac airspace consolidation   and/or atelectasis.

## 2024-10-30 NOTE — PROGRESS NOTE ADULT - SUBJECTIVE AND OBJECTIVE BOX
ROGELIOJAYSON JIMMY  73y  Patient is a 73y old  Male who presents with a chief complaint of Sepsis and acute hypoxic respiratory failure secondary to suspected aspiration PNA vs. HCAP (30 Oct 2024 08:17)    HPI:  ESRD on HD, s/p tracheostomy on CPAP.    HEALTH ISSUES - PROBLEM Dx:  Sepsis due to pneumonia    Acute respiratory failure with hypoxia    Hypertensive urgency    Constipation    Lactic acidosis    Insulin dependent type 2 diabetes mellitus    ESRD on hemodialysis    History of cerebrovascular accident (CVA) with residual deficit    History of DVT of lower extremity    Microcytic anemia    Hypoxia    Severe protein-calorie malnutrition    Encounter for palliative care          MEDICATIONS  (STANDING):  albuterol/ipratropium for Nebulization 3 milliLiter(s) Nebulizer every 6 hours  ceftolozane/tazobactam IVPB 450 milliGRAM(s) IV Intermittent every 8 hours  chlorhexidine 0.12% Liquid 15 milliLiter(s) Oral Mucosa every 12 hours  chlorhexidine 2% Cloths 1 Application(s) Topical <User Schedule>  dextrose 5%. 1000 milliLiter(s) (50 mL/Hr) IV Continuous <Continuous>  dextrose 5%. 1000 milliLiter(s) (100 mL/Hr) IV Continuous <Continuous>  dextrose 50% Injectable 25 Gram(s) IV Push once  dextrose 50% Injectable 12.5 Gram(s) IV Push once  dextrose 50% Injectable 25 Gram(s) IV Push once  epoetin reilly-epbx (RETACRIT) Injectable 27577 Unit(s) IV Push <User Schedule>  ferrous    sulfate Liquid 300 milliGRAM(s) Enteral Tube daily  glucagon  Injectable 1 milliGRAM(s) IntraMuscular once  heparin   Injectable 5000 Unit(s) SubCutaneous every 12 hours  insulin glargine Injectable (LANTUS) 15 Unit(s) SubCutaneous at bedtime  insulin lispro (ADMELOG) corrective regimen sliding scale   SubCutaneous every 6 hours  lactobacillus acidophilus 1 Tablet(s) Oral daily  Nephro-noam 1 Tablet(s) Oral daily  nystatin Powder 1 Application(s) Topical every 8 hours  pantoprazole   Suspension 40 milliGRAM(s) Enteral Tube daily  sodium chloride 3%  Inhalation 4 milliLiter(s) Inhalation every 6 hours    MEDICATIONS  (PRN):  acetaminophen   Oral Liquid .. 650 milliGRAM(s) Oral every 6 hours PRN Temp greater or equal to 38.5C (101.3F)  dextrose Oral Gel 15 Gram(s) Oral once PRN Blood Glucose LESS THAN 70 milliGRAM(s)/deciliter    Vital Signs Last 24 Hrs  T(C): 37.4 (30 Oct 2024 08:00), Max: 37.7 (29 Oct 2024 17:02)  T(F): 99.4 (30 Oct 2024 08:00), Max: 99.8 (29 Oct 2024 17:02)  HR: 79 (30 Oct 2024 08:00) (79 - 108)  BP: 137/72 (30 Oct 2024 08:00) (97/57 - 163/77)  BP(mean): 91 (30 Oct 2024 08:00) (66 - 100)  RR: 21 (30 Oct 2024 08:00) (18 - 30)  SpO2: 100% (30 Oct 2024 08:00) (88% - 100%)    Parameters below as of 29 Oct 2024 17:46  Patient On (Oxygen Delivery Method): ventilator      Daily     Daily Weight in k.5 (29 Oct 2024 15:45)    PHYSICAL EXAM:  Constitutional:  He appears comfortable and not distressed. Not diaphoretic.    Neck:  The thyroid is normal. Trachea is midline.     Breasts: Normal examination.    Respiratory: The lungs are clear to auscultation. No dullness and expansion is normal.    Cardiovascular: S1 and S2 are normal. No mummurs, rubs or gallops are present.    Gastrointestinal: The abdomen is soft. No tenderness is present. No masses are present. Bowel sounds are normal.    Genitourinary: The bladder is not distended. No CVA tenderness is present.    Extremities: No edema is noted. No deformities are present.    Neurological: Lethargic. Tone, power and sensation are normal.     Skin: No leasions are seen  or palpated.    Lymph Nodes: No lymphadenopathy is present.                            8.7    13.68 )-----------( 255      ( 30 Oct 2024 02:40 )             27.5     10-30    135  |  98  |  51[H]  ----------------------------<  225[H]  3.8   |  26  |  3.26[H]    Ca    9.0      30 Oct 2024 02:40  Phos  3.5     10-30  Mg     2.3     10-30

## 2024-10-30 NOTE — PROGRESS NOTE ADULT - NS ATTEND AMEND GEN_ALL_CORE FT
I have reviewed all pertinent clinical information and agree with the NP's note.  Any new labs, recent cultures, new imaging (if applicable) and vitals have been reviewed today.  All necessary adjustments to management have been made.  Agree with the above assessment and plan.      Discussed plan with patients primary team.    Nelson Magallon DO  Chief, Infectious Disease at [facility]  Reachable via Microsoft Teams or ID office: [phone]  Weekdays: After 5pm, please call [phone] for all inquiries and new consults  Weekends: Message on-call infectious disease physician via teams (see Ayah)

## 2024-10-30 NOTE — PROGRESS NOTE ADULT - ASSESSMENT
ESRD:  On HD TIW, last on Tuesday.  - HD TIW, on TTS  - Placement at [facility].  - Limit UF due to Hypotension.  - Midodrine as needed.      Anemia:  - Transfuse for Hg < 7.  - Continue EPO.    Respiratory Failure:  Failed extubation.  - s/p Tracheostomy.    Otf Mena MD  Nephrology

## 2024-10-30 NOTE — PROGRESS NOTE ADULT - ASSESSMENT
Megha Art is a 73 year old male with PMHx of HTN, IDDM2, ESRD on HD (M/W/F, through RUE AV fistula), hx of CVA x 2 (with residual R. sided weakness and dysphagia s/p PEG tube, previously with tracheostomy and now removed), and hx of RLE DVT (completed apixaban course) who presented to the ED on 10/4/24 for complaints of cough and chills. In the ED, Tmax 101.3, HR as elevated as 118, BP as elevated as 201/68, and SpO2 as low as 91% on room air. Initially placed on 15L NRB with improvement of SpO2 to 95%. Now on room air. WBC 17.38K, hgb 9.9, sodium 129, BUN 61, Cr 4.05, alkphos 169. Lactic acid 3.6. CT head without evidence of acute hemorrhage mass or mass effect but with encephalomalacia and gliosis again seen involving the bifrontal region. CT CAP with pneumonia and rectum distended with stool. Received  cc bolus, vancomycin 1 g IV, zosyn 3.375 g IV, acetaminophen 1 g IV, hydralazine 10 mg IV, pantoprazole 40 mg IV, and ondansetron 4 mg IV. Evaluated by nephrology who is planning for HD tomorrow. (04 Oct 2024 22:03)    Hospital course has been complicated with long ICU stay, failed trial of extubation 10/11, reintubation on 10/14. He is s/p bronch x 2. BAL with PsAg and Stenotrophomonas. He was on Pip-deb from 10/4-->10/15. Switched to Levofloxacin IV on 10/16.  ID was re-called last week for antibiotics management.      10/9: intubated, sedated, getting hemodialysis again today, continue high dose zosyn while awaiting cultures, cultures negative thus far, s/p bronch and will follow those cultures and adjust antibiotics accordingly   10/10: Afebrile, Intubated, sedated. Noted with Hgb of 5.9 this am. Improved tracheal secretions per RN. On minimal vent settings, possible extubation trial today. HD tomorrow per renal. No growth on blood cultures. Bronch culture with Pseudomonas aeruginosa, pending susceptibilities.  10/11: extubated but wheezing, mucous plug, aggressive PT, awaiting pseudomonas susceptibilities    10/18: Afebrile today. T-max 100.6 F - 100.9 F last 2 days. Remains intubated, on minimal sedation, and one vasopressor. Basurto an episode of desaturation. He is on 90%FiO2. On HD today. No excessive oral secretions or diarrhea as per bedside RN. PsAg and Steno susceptibilities reviewed. MRSA screen negative on 10/10  10/21: nearing end of antibiotics, continue agressive pulmonary toilet, tracheostomy likely this week  10/25-  febrile  t-max-100.9  leukocytosis 15 k decreased from 19K  10/28: remains in ICU, downgraded, awaiting for bed, grew  pseudomonas in trach aspirate, on isolation now, + Fevers with Tmax 102.2, vented via tracheostomy, WBC better 10.57, LFTS ok, BCs NGTD, will obtain two sets of BCs, then Zerbaxa IV started. RVP negative.   10/29: low grade fever today only started (Zerbaxa) ceftolozane/tazobactam yesterday afternoon and will continue, breathing well on PS, hemodialysis today, wbc fluctuates between 10-15 now at 13.79  10/30: no fevers within 24 hrs, vented via tracheostomy, WBC same 13.68, Cr better 2.26, all BCs NGTD, Zerbaxa continued (day #3).       Impression:  1. Multifocal pneumonia- Dense RLL consolidation  2. Acute respiratory failure requiring intubation  3. Septic shock requiring vasopressor support  4. Rising leukocytosis and low grade fevers  5. ESRD on HD. Prior CVA, s/p PEG, Prior trach and decannulation, IDDM2   6.  Pseudomonas infection       Recommendations:  -completed levaquin treatment for pseudomonas pneumonia on 10/21  - became febrile with slightly worse secretions   - started (Zerbaxa) ceftolozane/tazobactam to cover carbapenem-resistant pseudomonas (Day #3)  - follow all culture data   - vent management as per ICU team/medicine team   -Off loading, frequent turns, nutritional optimization to prevent bed sores  -Continue hemodialysis as per renal   - wound care    will discuss with Dr. Magallon  Megha Art is a 73 year old male with PMHx of HTN, IDDM2, ESRD on HD (M/W/F, through RUE AV fistula), hx of CVA x 2 (with residual R. sided weakness and dysphagia s/p PEG tube, previously with tracheostomy and now removed), and hx of RLE DVT (completed apixaban course) who presented to the ED on 10/4/24 for complaints of cough and chills. In the ED, Tmax 101.3, HR as elevated as 118, BP as elevated as 201/68, and SpO2 as low as 91% on room air. Initially placed on 15L NRB with improvement of SpO2 to 95%. Now on room air. WBC 17.38K, hgb 9.9, sodium 129, BUN 61, Cr 4.05, alkphos 169. Lactic acid 3.6. CT head without evidence of acute hemorrhage mass or mass effect but with encephalomalacia and gliosis again seen involving the bifrontal region. CT CAP with pneumonia and rectum distended with stool. Received  cc bolus, vancomycin 1 g IV, zosyn 3.375 g IV, acetaminophen 1 g IV, hydralazine 10 mg IV, pantoprazole 40 mg IV, and ondansetron 4 mg IV. Evaluated by nephrology who is planning for HD tomorrow. (04 Oct 2024 22:03)    Hospital course has been complicated with long ICU stay, failed trial of extubation 10/11, reintubation on 10/14. He is s/p bronch x 2. BAL with PsAg and Stenotrophomonas. He was on Pip-deb from 10/4-->10/15. Switched to Levofloxacin IV on 10/16.  ID was re-called last week for antibiotics management.      10/9: intubated, sedated, getting hemodialysis again today, continue high dose zosyn while awaiting cultures, cultures negative thus far, s/p bronch and will follow those cultures and adjust antibiotics accordingly   10/10: Afebrile, Intubated, sedated. Noted with Hgb of 5.9 this am. Improved tracheal secretions per RN. On minimal vent settings, possible extubation trial today. HD tomorrow per renal. No growth on blood cultures. Bronch culture with Pseudomonas aeruginosa, pending susceptibilities.  10/11: extubated but wheezing, mucous plug, aggressive PT, awaiting pseudomonas susceptibilities    10/18: Afebrile today. T-max 100.6 F - 100.9 F last 2 days. Remains intubated, on minimal sedation, and one vasopressor. Basurto an episode of desaturation. He is on 90%FiO2. On HD today. No excessive oral secretions or diarrhea as per bedside RN. PsAg and Steno susceptibilities reviewed. MRSA screen negative on 10/10  10/21: nearing end of antibiotics, continue agressive pulmonary toilet, tracheostomy likely this week  10/25-  febrile  t-max-100.9  leukocytosis 15 k decreased from 19K  10/28: remains in ICU, downgraded, awaiting for bed, grew  pseudomonas in trach aspirate, on isolation now, + Fevers with Tmax 102.2, vented via tracheostomy, WBC better 10.57, LFTS ok, BCs NGTD, will obtain two sets of BCs, then Zerbaxa IV started. RVP negative.   10/29: low grade fever today only started (Zerbaxa) ceftolozane/tazobactam yesterday afternoon and will continue, breathing well on PS, hemodialysis today, wbc fluctuates between 10-15 now at 13.79  10/30: no fevers within 24 hrs, vented via tracheostomy, WBC same 13.68, Cr better 2.26, all BCs NGTD, Zerbaxa continued (day #3).       Impression:  1. Multifocal pneumonia- Dense RLL consolidation  2. Acute respiratory failure requiring intubation  3. Septic shock requiring vasopressor support  4. Rising leukocytosis and low grade fevers  5. ESRD on HD. Prior CVA, s/p PEG, Prior trach and decannulation, IDDM2   6.  Pseudomonas infection       Recommendations:  -completed levaquin treatment for pseudomonas pneumonia on 10/21  - became febrile with slightly worse secretions   - started (Zerbaxa) ceftolozane/tazobactam to cover carbapenem-resistant pseudomonas (Day #3)  - follow all culture data   - vent management as per ICU team/medicine team   -Off loading, frequent turns, nutritional optimization to prevent bed sores  -Continue hemodialysis as per renal   - wound care

## 2024-10-30 NOTE — PROGRESS NOTE ADULT - NS ATTEND AMEND GEN_ALL_CORE FT
pt seen and examined at bedside with PA    afebrile today  fever 100.7 yesterday morning  tolerated pressure support weaning with 10/5 yesterday for 7 hours  however today had apneic events and low TV with pressure support 10/5  placed on higher pressure support today 12/5 and tolerating weaning with higher support  s/p HD yesterday 1.5L fluid removed      73M PMH HTN, DM, ESRD on HD (RUE AVF), CVA 5/2024 with residual R weakness and dysphagia s/p PEG (5/17/24) and trach (5/14/24) subsequently decannulated 7/5/24, R common iliac DVT (5/2024) s/p course of apixiban, COVID PNA 7/2024 with hypoxia requiring high flow (s/p treatment with remdesivir and dexamethasone, weaned off O2 supplementation) presents with cough and sputum production. Febrile in ED with noted leukocytosis. CT chest showed RLL pneumonia. Course with worsening acute hypoxemic respiratory failure, RRTs for hypoxia. Upgraded to ICU 10/8 for worsening hypoxemia high flow -> BiPAP. Intubated 10/8 for hypoxia due to PNA and thick secretions. s/p bronchoscopy 10/8: BAL cx with pseudomonas and Stenotrophomonas. Weaned and extubated 10/10, failed extubation and reintubated for hypoxia 10/14 s/p repeat bronchoscopy with thick secretions retrieved cultures with pseudomonas. s/p trach 10/23. Downgraded to medical service 10/24. Course with fevers. Sputum cx 10/25 with carbapenem resistant pseudomonas.     DX: acute hypoxic respiratory failure requiring intubation/reintubation, failure to wean s/p trach, gram negative PNA (pseudomonas and stenotrophomonas), carbapenem resistant pseudomonas infection, ESRD on HD, HTN, sepsis present on admission    - on zerbaxa (day #3) for carbapenem resistant pseudomonas PNA, antibx per ID  - s/p levaquin 10/16 - 10/22 for pseudomonas/stenotrophomonas in bronch cx  - s/p bactrim 10/12 - 10/16 for stenotrophomonas  - s/p zosyn 10/4 - 10/8  - cont daily weaning with pressure support as tolerates. required higher pressure support of 12/5 today to wean with adequate TV. low TV and apnea on pressure support 10/5  - rest on full vent support at night  - cont duonebs q6 hrs and hypersal nebs for impaired secretion mobilization  - s/p HD yesterday with 1.5L fluid removal  - s/p trach POD #7 (#7 portex in place)  - local trach care  - tracheal suctioning as needed  - DVT ppx: on heparin sc  - remainder of care per primary team  - full code

## 2024-10-30 NOTE — PROGRESS NOTE ADULT - ASSESSMENT
74 yo M with h/o HTN, DM2, ESRD on HD (MWF, RUE AV Fistula), CVA with residual R sided weakness and dysphagia s/p peg, s/p trach since decannulated, RLE DVT (completed course of Eliquis) admitted with 1) RLL PNA with course complicated by acute hypoxic respiratory failure ultimately requiring intubation, failed trial of extubation and was subsequently reintubated 10/14 and tracheostomy on 10/14. 2) Septic shock 2 to PNA    Recs:  - Patient admitted w/ Septic shock POA due to RLL PNA with course complicated by acute hypoxic respiratory failure ultimately requiring admission to the ICU for intubation on 10/8  - Failed trial of extubation and was subsequently reintubated on 10/14  - Tracheostomy placed by surgery on 10/23; has a chronic PEG tube already   - He is s/p bronch x 2. BAL with PsAg and Stenotrophomonas & bronch culture with Pseudomonas aeruginosa on 10/8 & 10/14  - completed course of zosyn and levaquin for the above, ID following appreciated recs   - Plan was to monitor off abx however over the weekend patient has increased mucous plugging/secretions and sputum cx grew now carbapenem resistant pseudomonas in sputum (CRE)  - C/W Zerbaxa. ID following, ABX as per ID.   - continue aggressive pulm toileting modalities with chest PT, duonebs and hypersal   - Trach to vent; appears to tolerate SBT at times with higher settings like 12/5 or 10/5 will continue to attempt daily   - HD per nephrology   - rest of care per primary team     note incomplete  72 yo M with h/o HTN, DM2, ESRD on HD (MWF, RUE AV Fistula), CVA with residual R sided weakness and dysphagia s/p peg, s/p trach since decannulated, RLE DVT (completed course of Eliquis) admitted with 1) RLL PNA with course complicated by acute hypoxic respiratory failure ultimately requiring intubation, failed trial of extubation and was subsequently reintubated 10/14 and tracheostomy on 10/14. 2) Septic shock 2 to PNA    Recs:  - Patient admitted w/ Septic shock POA due to RLL PNA with course complicated by acute hypoxic respiratory failure ultimately requiring admission to the ICU for intubation on 10/8  - Failed trial of extubation and was subsequently reintubated on 10/14  - Tracheostomy placed by surgery on 10/23; has a chronic PEG tube already   - He is s/p bronch x 2. BAL with PsAg and Stenotrophomonas & bronch culture with Pseudomonas aeruginosa on 10/8 & 10/14  - completed course of zosyn and levaquin. Now on Zyrbaxa per ID. f/u recs  - continue aggressive pulm toileting modalities with chest PT, duonebs and hypersal   - Trach to vent; appears to tolerate SBT at times with higher settings like 12/5  will continue to attempt daily, but may need to wait for infection to improve to wean better  - maintain on duonebs javan hypersal with thick secretions from infection   - HD per nephrology   - rest of care per primary team     d/w dr sherman

## 2024-10-30 NOTE — PROGRESS NOTE ADULT - SUBJECTIVE AND OBJECTIVE BOX
JIMMY BLAND  MRN-[id number]  73y (1950)    Follow Up:   pseudomonas in tracheostomy aspirate, multifocal pna, fevers     Interval History: The pt was seen and examined earlier, not in acute distress, awake and nods in response to some questions. Pt is afebrile since yesterday, no temps for 24 hrs, with tracheostomy, WBC is the same 13.68.     PAST MEDICAL & SURGICAL HISTORY:  ESRD on hemodialysis      HTN (hypertension)      Insulin dependent type 2 diabetes mellitus      History of cerebrovascular accident (CVA) with residual deficit      History of DVT of lower extremity      Arteriovenous fistula      S/P percutaneous endoscopic gastrostomy (PEG) tube placement      History of tracheostomy          ROS:    [x ] Unobtainable because: pt with tracheostomy, shakes his head when asked if he has any sob or chest pain or abdominal pain   [ ] All other systems negative    Constitutional: no fever, no chills  Head: no trauma  Eyes: no vision changes, no eye pain  ENT:  no sore throat, no rhinorrhea  Cardiovascular:  no chest pain, no palpitation  Respiratory:  no SOB, no cough  GI:  no abd pain, no vomiting, no diarrhea  urinary: no dysuria, no hematuria, no flank pain  musculoskeletal:  no joint pain, no joint swelling  skin:  no rash  neurology:  no headache, no seizure, no change in mental status  psych: no anxiety, no depression         Allergies  No Known Allergies        ANTIMICROBIALS:  ceftolozane/tazobactam IVPB 450 every 8 hours      OTHER MEDS:  acetaminophen   Oral Liquid .. 650 milliGRAM(s) Oral every 6 hours PRN  albuterol/ipratropium for Nebulization 3 milliLiter(s) Nebulizer every 6 hours  chlorhexidine 0.12% Liquid 15 milliLiter(s) Oral Mucosa every 12 hours  chlorhexidine 2% Cloths 1 Application(s) Topical <User Schedule>  dextrose 5%. 1000 milliLiter(s) IV Continuous <Continuous>  dextrose 5%. 1000 milliLiter(s) IV Continuous <Continuous>  dextrose 50% Injectable 12.5 Gram(s) IV Push once  dextrose 50% Injectable 25 Gram(s) IV Push once  dextrose 50% Injectable 25 Gram(s) IV Push once  dextrose Oral Gel 15 Gram(s) Oral once PRN  epoetin reilly-epbx (RETACRIT) Injectable 11055 Unit(s) IV Push <User Schedule>  ferrous    sulfate Liquid 300 milliGRAM(s) Enteral Tube daily  glucagon  Injectable 1 milliGRAM(s) IntraMuscular once  heparin   Injectable 5000 Unit(s) SubCutaneous every 12 hours  insulin glargine Injectable (LANTUS) 15 Unit(s) SubCutaneous at bedtime  insulin lispro (ADMELOG) corrective regimen sliding scale   SubCutaneous every 6 hours  lactobacillus acidophilus 1 Tablet(s) Oral daily  Nephro-noam 1 Tablet(s) Oral daily  nystatin Powder 1 Application(s) Topical every 8 hours  pantoprazole   Suspension 40 milliGRAM(s) Enteral Tube daily  sodium chloride 3%  Inhalation 4 milliLiter(s) Inhalation every 6 hours      Vital Signs Last 24 Hrs  T(C): 37.4 (30 Oct 2024 08:00), Max: 37.7 (29 Oct 2024 17:02)  T(F): 99.4 (30 Oct 2024 08:00), Max: 99.8 (29 Oct 2024 17:02)  HR: 79 (30 Oct 2024 08:00) (79 - 108)  BP: 137/72 (30 Oct 2024 08:00) (97/57 - 145/83)  BP(mean): 91 (30 Oct 2024 08:00) (66 - 92)  RR: 21 (30 Oct 2024 08:00) (18 - 30)  SpO2: 100% (30 Oct 2024 08:00) (88% - 100%)    Parameters below as of 29 Oct 2024 17:46  Patient On (Oxygen Delivery Method): ventilator        Physical Exam:  General: Chronically ill appearing elderly man  HEENT: trached and on the vent, small amount of clear / white secretions around the tube    Cardio: regular, S1, S2,  no murmur  Respiratory:  Breath sounds largely clear today bilaterally, on vent  abd:  soft,   BS +,   no tenderness, + peg    Musculoskeletal:   no joint swelling,   no edema  Lines: +PIV, RUE AV fistula , rectal tube with liquid stool in the bag  Skin: warm dry skin  Neurologic: awake, at times follows with his eyes, nods at times, squeezes hand when asked  psych: unable     WBC Count: 13.68 K/uL (10-30 @ 02:40)  WBC Count: 13.79 K/uL (10-29 @ 03:00)  WBC Count: 10.57 K/uL (10-28 @ 02:32)  WBC Count: 12.19 K/uL (10-27 @ 02:29)  WBC Count: 14.27 K/uL (10-26 @ 04:27)  WBC Count: 14.89 K/uL (10-26 @ 02:15)  WBC Count: 15.99 K/uL (10-25 @ 02:40)                            8.7    13.68 )-----------( 255      ( 30 Oct 2024 02:40 )             27.5       10-30    135  |  98  |  51[H]  ----------------------------<  225[H]  3.8   |  26  |  3.26[H]    Ca    9.0      30 Oct 2024 02:40  Phos  3.5     10-30  Mg     2.3     10-30        Urinalysis Basic - ( 30 Oct 2024 02:40 )    Color: x / Appearance: x / SG: x / pH: x  Gluc: 225 mg/dL / Ketone: x  / Bili: x / Urobili: x   Blood: x / Protein: x / Nitrite: x   Leuk Esterase: x / RBC: x / WBC x   Sq Epi: x / Non Sq Epi: x / Bacteria: x        Creatinine Trend: 3.26<--, 5.08<--, 4.21<--, 3.00<--, 4.04<--, 2.81<--      MICROBIOLOGY:  v  .Blood BLOOD  10-28-24   No growth at 24 hours  --  --      .Blood Blood  10-25-24   Culture is being performed.  --  --      Trach Asp Tracheal Aspirate  10-25-24   Moderate Pseudomonas aeruginosa (Carbapenem Resistant)  Commensal dominick consistent with body site  --  Pseudomonas aeruginosa (Carbapenem Resistant)      .Blood BLOOD  10-25-24   No growth at 4 days  --  --      .Blood BLOOD  10-18-24   No growth at 5 days  --  --      Bronchial  10-14-24   No acid-fast bacilli isolated at 1 week. ****Acid-fast cultures are held  for 6 weeks.****  --  Pseudomonas aeruginosa      Bronchial  10-08-24   Few Pseudomonas aeruginosa  Moderate Stenotrophomonas maltophilia  --  Pseudomonas aeruginosa  Stenotrophomonas maltophilia      .Stool  10-08-24   No enteric pathogens isolated.  (Stool culture examined for Salmonella,  Shigella, Campylobacter, Aeromonas, Plesiomonas,  Vibrio, E.coli O157 and Yersinia)  --  --      .Blood BLOOD  10-07-24   No growth at 5 days  --  --      .Blood BLOOD  10-07-24   No growth at 5 days  --  --      .Blood BLOOD  10-04-24   No growth at 5 days  --  --      .Blood BLOOD  10-04-24   No growth at 5 days  --  --          Rapid RVP Result: NotDetec (10-25 @ 16:55)      SARS-CoV-2: NotDetec (10-25-24 @ 16:55)  Rapid RVP Result: NotDetec (10-25-24 @ 16:55)    SARS-CoV-2: NotDetec (25 Oct 2024 16:55)  SARS-CoV-2: NotDetec (11 Oct 2024 14:45)  SARS-CoV-2: NotDetec (08 Oct 2024 10:00)  COVID-19 PCR: Detected (09 Jul 2024 13:43)  COVID-19 PCR: NotDetec (07 Jul 2024 04:30)  SARS-CoV-2: NotDetec (29 Jun 2024 22:30)  COVID-19 PCR: NotDetec (14 Jun 2024 18:56)    RADIOLOGY:

## 2024-10-30 NOTE — PROGRESS NOTE ADULT - SUBJECTIVE AND OBJECTIVE BOX
PROGRESS NOTE:   Authored by Dr. Desean Steel MD, Available on MS Teams    Patient is a 73y old  Male who presents with a chief complaint of Sepsis and acute hypoxic respiratory failure secondary to suspected aspiration PNA vs. HCAP (30 Oct 2024 10:26)      SUBJECTIVE / OVERNIGHT EVENTS: Patient trached, denies any pain.     ADDITIONAL REVIEW OF SYSTEMS:    MEDICATIONS  (STANDING):  albuterol/ipratropium for Nebulization 3 milliLiter(s) Nebulizer every 6 hours  ceftolozane/tazobactam IVPB 450 milliGRAM(s) IV Intermittent every 8 hours  chlorhexidine 0.12% Liquid 15 milliLiter(s) Oral Mucosa every 12 hours  chlorhexidine 2% Cloths 1 Application(s) Topical <User Schedule>  dextrose 5%. 1000 milliLiter(s) (100 mL/Hr) IV Continuous <Continuous>  dextrose 5%. 1000 milliLiter(s) (50 mL/Hr) IV Continuous <Continuous>  dextrose 50% Injectable 12.5 Gram(s) IV Push once  dextrose 50% Injectable 25 Gram(s) IV Push once  dextrose 50% Injectable 25 Gram(s) IV Push once  epoetin reilly-epbx (RETACRIT) Injectable 22071 Unit(s) IV Push <User Schedule>  ferrous    sulfate Liquid 300 milliGRAM(s) Enteral Tube daily  glucagon  Injectable 1 milliGRAM(s) IntraMuscular once  heparin   Injectable 5000 Unit(s) SubCutaneous every 12 hours  insulin glargine Injectable (LANTUS) 15 Unit(s) SubCutaneous at bedtime  insulin lispro (ADMELOG) corrective regimen sliding scale   SubCutaneous every 6 hours  lactobacillus acidophilus 1 Tablet(s) Oral daily  Nephro-noam 1 Tablet(s) Oral daily  nystatin Powder 1 Application(s) Topical every 8 hours  pantoprazole   Suspension 40 milliGRAM(s) Enteral Tube daily  sodium chloride 3%  Inhalation 4 milliLiter(s) Inhalation every 6 hours    MEDICATIONS  (PRN):  acetaminophen   Oral Liquid .. 650 milliGRAM(s) Oral every 6 hours PRN Temp greater or equal to 38.5C (101.3F)  dextrose Oral Gel 15 Gram(s) Oral once PRN Blood Glucose LESS THAN 70 milliGRAM(s)/deciliter      CAPILLARY BLOOD GLUCOSE      POCT Blood Glucose.: 171 mg/dL (30 Oct 2024 11:44)  POCT Blood Glucose.: 216 mg/dL (30 Oct 2024 06:02)  POCT Blood Glucose.: 269 mg/dL (29 Oct 2024 22:12)  POCT Blood Glucose.: 101 mg/dL (29 Oct 2024 17:11)    I&O's Summary    29 Oct 2024 07:01  -  30 Oct 2024 07:00  --------------------------------------------------------  IN: 300 mL / OUT: 1500 mL / NET: -1200 mL    30 Oct 2024 07:01  -  30 Oct 2024 13:28  --------------------------------------------------------  IN: 200 mL / OUT: 0 mL / NET: 200 mL        PHYSICAL EXAM:  Vital Signs Last 24 Hrs  T(C): 37.4 (30 Oct 2024 08:00), Max: 37.7 (29 Oct 2024 17:02)  T(F): 99.4 (30 Oct 2024 08:00), Max: 99.8 (29 Oct 2024 17:02)  HR: 79 (30 Oct 2024 08:00) (79 - 108)  BP: 137/72 (30 Oct 2024 08:00) (97/57 - 145/83)  BP(mean): 91 (30 Oct 2024 08:00) (66 - 92)  RR: 21 (30 Oct 2024 08:00) (18 - 30)  SpO2: 100% (30 Oct 2024 08:00) (88% - 100%)    Parameters below as of 29 Oct 2024 17:46  Patient On (Oxygen Delivery Method): ventilator        CONSTITUTIONAL: NAD  HEENT: s/p tracheostomy  RESPIRATORY: Normal respiratory effort; no wheezing  CARDIOVASCULAR: Regular rate and rhythm, normal S1 and S2, no murmur/rub/gallop; No lower extremity edema  ABDOMEN: Nontender to palpation, normoactive bowel sounds, no rebound/guarding  MUSCLOSKELETAL: no clubbing or cyanosis of digits, dec ROM of b/l LE  PSYCH: A+O to person  NEURO: awake, able to follow commands    LABS:                        8.7    13.68 )-----------( 255      ( 30 Oct 2024 02:40 )             27.5     10-30    135  |  98  |  51[H]  ----------------------------<  225[H]  3.8   |  26  |  3.26[H]    Ca    9.0      30 Oct 2024 02:40  Phos  3.5     10-30  Mg     2.3     10-30            Urinalysis Basic - ( 30 Oct 2024 02:40 )    Color: x / Appearance: x / SG: x / pH: x  Gluc: 225 mg/dL / Ketone: x  / Bili: x / Urobili: x   Blood: x / Protein: x / Nitrite: x   Leuk Esterase: x / RBC: x / WBC x   Sq Epi: x / Non Sq Epi: x / Bacteria: x        Culture - Blood (collected 28 Oct 2024 15:00)  Source: .Blood BLOOD  Preliminary Report (30 Oct 2024 01:01):    No growth at 24 hours    Culture - Blood (collected 28 Oct 2024 15:00)  Source: .Blood BLOOD  Preliminary Report (30 Oct 2024 01:01):    No growth at 24 hours

## 2024-10-31 LAB
ALBUMIN SERPL ELPH-MCNC: 1.8 G/DL — LOW (ref 3.3–5)
ALP SERPL-CCNC: 207 U/L — HIGH (ref 40–120)
ALT FLD-CCNC: 16 U/L — SIGNIFICANT CHANGE UP (ref 12–78)
ANION GAP SERPL CALC-SCNC: 12 MMOL/L — SIGNIFICANT CHANGE UP (ref 5–17)
AST SERPL-CCNC: 28 U/L — SIGNIFICANT CHANGE UP (ref 15–37)
BASOPHILS # BLD AUTO: 0.05 K/UL — SIGNIFICANT CHANGE UP (ref 0–0.2)
BASOPHILS NFR BLD AUTO: 0.5 % — SIGNIFICANT CHANGE UP (ref 0–2)
BILIRUB SERPL-MCNC: 0.3 MG/DL — SIGNIFICANT CHANGE UP (ref 0.2–1.2)
BUN SERPL-MCNC: 73 MG/DL — HIGH (ref 7–23)
CALCIUM SERPL-MCNC: 9.6 MG/DL — SIGNIFICANT CHANGE UP (ref 8.5–10.1)
CHLORIDE SERPL-SCNC: 96 MMOL/L — SIGNIFICANT CHANGE UP (ref 96–108)
CO2 SERPL-SCNC: 27 MMOL/L — SIGNIFICANT CHANGE UP (ref 22–31)
CREAT SERPL-MCNC: 4.46 MG/DL — HIGH (ref 0.5–1.3)
EGFR: 13 ML/MIN/1.73M2 — LOW
EOSINOPHIL # BLD AUTO: 0.3 K/UL — SIGNIFICANT CHANGE UP (ref 0–0.5)
EOSINOPHIL NFR BLD AUTO: 3 % — SIGNIFICANT CHANGE UP (ref 0–6)
GLUCOSE BLDC GLUCOMTR-MCNC: 154 MG/DL — HIGH (ref 70–99)
GLUCOSE BLDC GLUCOMTR-MCNC: 158 MG/DL — HIGH (ref 70–99)
GLUCOSE BLDC GLUCOMTR-MCNC: 173 MG/DL — HIGH (ref 70–99)
GLUCOSE BLDC GLUCOMTR-MCNC: 226 MG/DL — HIGH (ref 70–99)
GLUCOSE BLDC GLUCOMTR-MCNC: 261 MG/DL — HIGH (ref 70–99)
GLUCOSE SERPL-MCNC: 228 MG/DL — HIGH (ref 70–99)
HCT VFR BLD CALC: 27.8 % — LOW (ref 39–50)
HGB BLD-MCNC: 8.7 G/DL — LOW (ref 13–17)
IMM GRANULOCYTES NFR BLD AUTO: 0.4 % — SIGNIFICANT CHANGE UP (ref 0–0.9)
LYMPHOCYTES # BLD AUTO: 0.9 K/UL — LOW (ref 1–3.3)
LYMPHOCYTES # BLD AUTO: 9.1 % — LOW (ref 13–44)
MAGNESIUM SERPL-MCNC: 2.5 MG/DL — SIGNIFICANT CHANGE UP (ref 1.6–2.6)
MCHC RBC-ENTMCNC: 24.4 PG — LOW (ref 27–34)
MCHC RBC-ENTMCNC: 31.3 G/DL — LOW (ref 32–36)
MCV RBC AUTO: 77.9 FL — LOW (ref 80–100)
MONOCYTES # BLD AUTO: 0.68 K/UL — SIGNIFICANT CHANGE UP (ref 0–0.9)
MONOCYTES NFR BLD AUTO: 6.8 % — SIGNIFICANT CHANGE UP (ref 2–14)
NEUTROPHILS # BLD AUTO: 7.97 K/UL — HIGH (ref 1.8–7.4)
NEUTROPHILS NFR BLD AUTO: 80.2 % — HIGH (ref 43–77)
NRBC # BLD: 0 /100 WBCS — SIGNIFICANT CHANGE UP (ref 0–0)
PHOSPHATE SERPL-MCNC: 4.5 MG/DL — SIGNIFICANT CHANGE UP (ref 2.5–4.5)
PLATELET # BLD AUTO: 232 K/UL — SIGNIFICANT CHANGE UP (ref 150–400)
POTASSIUM SERPL-MCNC: 4.2 MMOL/L — SIGNIFICANT CHANGE UP (ref 3.5–5.3)
POTASSIUM SERPL-SCNC: 4.2 MMOL/L — SIGNIFICANT CHANGE UP (ref 3.5–5.3)
PROT SERPL-MCNC: 7.5 GM/DL — SIGNIFICANT CHANGE UP (ref 6–8.3)
RBC # BLD: 3.57 M/UL — LOW (ref 4.2–5.8)
RBC # FLD: 23.1 % — HIGH (ref 10.3–14.5)
SODIUM SERPL-SCNC: 135 MMOL/L — SIGNIFICANT CHANGE UP (ref 135–145)
WBC # BLD: 9.94 K/UL — SIGNIFICANT CHANGE UP (ref 3.8–10.5)
WBC # FLD AUTO: 9.94 K/UL — SIGNIFICANT CHANGE UP (ref 3.8–10.5)

## 2024-10-31 PROCEDURE — 99233 SBSQ HOSP IP/OBS HIGH 50: CPT | Mod: 25

## 2024-10-31 PROCEDURE — 31720 CLEARANCE OF AIRWAYS: CPT

## 2024-10-31 PROCEDURE — 99232 SBSQ HOSP IP/OBS MODERATE 35: CPT

## 2024-10-31 PROCEDURE — 99233 SBSQ HOSP IP/OBS HIGH 50: CPT

## 2024-10-31 RX ORDER — HYDRALAZINE HYDROCHLORIDE 50 MG/1
50 TABLET, FILM COATED ORAL EVERY 8 HOURS
Refills: 0 | Status: DISCONTINUED | OUTPATIENT
Start: 2024-10-31 | End: 2024-11-07

## 2024-10-31 RX ADMIN — ERYTHROPOIETIN 10000 UNIT(S): 10000 INJECTION, SOLUTION INTRAVENOUS; SUBCUTANEOUS at 19:46

## 2024-10-31 RX ADMIN — SODIUM CHLORIDE 4 MILLILITER(S): 9 INJECTION, SOLUTION INTRAMUSCULAR; INTRAVENOUS; SUBCUTANEOUS at 00:55

## 2024-10-31 RX ADMIN — SODIUM CHLORIDE 4 MILLILITER(S): 9 INJECTION, SOLUTION INTRAMUSCULAR; INTRAVENOUS; SUBCUTANEOUS at 17:47

## 2024-10-31 RX ADMIN — Medication 300 MILLIGRAM(S): at 12:29

## 2024-10-31 RX ADMIN — CHLORHEXIDINE GLUCONATE 1 APPLICATION(S): 40 SOLUTION TOPICAL at 11:49

## 2024-10-31 RX ADMIN — HEPARIN SODIUM 5000 UNIT(S): 10000 INJECTION INTRAVENOUS; SUBCUTANEOUS at 05:13

## 2024-10-31 RX ADMIN — NYSTATIN 1 APPLICATION(S): 100000 POWDER TOPICAL at 21:27

## 2024-10-31 RX ADMIN — HEPARIN SODIUM 5000 UNIT(S): 10000 INJECTION INTRAVENOUS; SUBCUTANEOUS at 17:14

## 2024-10-31 RX ADMIN — IPRATROPIUM BROMIDE AND ALBUTEROL SULFATE 3 MILLILITER(S): .5; 2.5 SOLUTION RESPIRATORY (INHALATION) at 12:20

## 2024-10-31 RX ADMIN — CHLORHEXIDINE GLUCONATE 15 MILLILITER(S): 40 SOLUTION TOPICAL at 05:13

## 2024-10-31 RX ADMIN — Medication 6: at 23:31

## 2024-10-31 RX ADMIN — IPRATROPIUM BROMIDE AND ALBUTEROL SULFATE 3 MILLILITER(S): .5; 2.5 SOLUTION RESPIRATORY (INHALATION) at 05:25

## 2024-10-31 RX ADMIN — Medication 2: at 17:15

## 2024-10-31 RX ADMIN — SODIUM CHLORIDE 4 MILLILITER(S): 9 INJECTION, SOLUTION INTRAMUSCULAR; INTRAVENOUS; SUBCUTANEOUS at 05:25

## 2024-10-31 RX ADMIN — PANTOPRAZOLE SODIUM 40 MILLIGRAM(S): 40 TABLET, DELAYED RELEASE ORAL at 12:30

## 2024-10-31 RX ADMIN — CHLORHEXIDINE GLUCONATE 15 MILLILITER(S): 40 SOLUTION TOPICAL at 17:15

## 2024-10-31 RX ADMIN — Medication 1 TABLET(S): at 12:29

## 2024-10-31 RX ADMIN — Medication 15 UNIT(S): at 21:18

## 2024-10-31 RX ADMIN — IPRATROPIUM BROMIDE AND ALBUTEROL SULFATE 3 MILLILITER(S): .5; 2.5 SOLUTION RESPIRATORY (INHALATION) at 23:55

## 2024-10-31 RX ADMIN — SODIUM CHLORIDE 4 MILLILITER(S): 9 INJECTION, SOLUTION INTRAMUSCULAR; INTRAVENOUS; SUBCUTANEOUS at 12:20

## 2024-10-31 RX ADMIN — Medication 4: at 05:13

## 2024-10-31 RX ADMIN — CEFTOLOZANE AND TAZOBACTAM 33.33 MILLIGRAM(S): 1; .5 INJECTION, POWDER, LYOPHILIZED, FOR SOLUTION INTRAVENOUS at 17:15

## 2024-10-31 RX ADMIN — HYDRALAZINE HYDROCHLORIDE 50 MILLIGRAM(S): 50 TABLET, FILM COATED ORAL at 13:57

## 2024-10-31 RX ADMIN — SODIUM CHLORIDE 4 MILLILITER(S): 9 INJECTION, SOLUTION INTRAMUSCULAR; INTRAVENOUS; SUBCUTANEOUS at 23:55

## 2024-10-31 RX ADMIN — CEFTOLOZANE AND TAZOBACTAM 33.33 MILLIGRAM(S): 1; .5 INJECTION, POWDER, LYOPHILIZED, FOR SOLUTION INTRAVENOUS at 23:24

## 2024-10-31 RX ADMIN — IPRATROPIUM BROMIDE AND ALBUTEROL SULFATE 3 MILLILITER(S): .5; 2.5 SOLUTION RESPIRATORY (INHALATION) at 17:47

## 2024-10-31 RX ADMIN — NYSTATIN 1 APPLICATION(S): 100000 POWDER TOPICAL at 17:14

## 2024-10-31 RX ADMIN — NYSTATIN 1 APPLICATION(S): 100000 POWDER TOPICAL at 05:14

## 2024-10-31 RX ADMIN — Medication 2: at 11:45

## 2024-10-31 RX ADMIN — CEFTOLOZANE AND TAZOBACTAM 33.33 MILLIGRAM(S): 1; .5 INJECTION, POWDER, LYOPHILIZED, FOR SOLUTION INTRAVENOUS at 10:20

## 2024-10-31 NOTE — PROGRESS NOTE ADULT - NS ATTEND AMEND GEN_ALL_CORE FT
I have reviewed all pertinent clinical information and agree with the NP's note.  Any new labs, recent cultures, new imaging (if applicable) and vitals have been reviewed today.  All necessary adjustments to management have been made.  Agree with the above assessment and plan.    Nelson Magallon, DO  Chief, Infectious Disease at [facility]  Reachable via Microsoft Teams or ID office: [phone]  Weekdays: After 5pm, please call [phone] for all inquiries and new consults  Weekends: Message on-call infectious disease physician via teams (see Ayah)

## 2024-10-31 NOTE — PROGRESS NOTE ADULT - SUBJECTIVE AND OBJECTIVE BOX
JIMMY BLAND  MRN-[id number]  73y (1950)    Follow Up:  PNA,  organism     Interval History: The pt was seen and examined earlier on the medical floor, not in acute distress, awake and alert, nods in response to my questions, denies any pain, pt's wife is present. Pt is afebrile, vented via tracheostimy, no WBC - normalized.    PAST MEDICAL & SURGICAL HISTORY:  ESRD on hemodialysis      HTN (hypertension)      Insulin dependent type 2 diabetes mellitus      History of cerebrovascular accident (CVA) with residual deficit      History of DVT of lower extremity      Arteriovenous fistula      S/P percutaneous endoscopic gastrostomy (PEG) tube placement      History of tracheostomy          ROS:  unclear how reliable the pt as a source of information, vented via trach, shakes his head denying any discomfort   [ ] Unobtainable because:  [ ] All other systems negative    Constitutional: no fever, no chills  Head: no trauma  Eyes: no vision changes, no eye pain  ENT:  no sore throat, no rhinorrhea  Cardiovascular:  no chest pain, no palpitation  Respiratory:  no SOB, no cough  GI:  no abd pain, no vomiting, no diarrhea  urinary: no dysuria, no hematuria, no flank pain  musculoskeletal:  no joint pain, no joint swelling  skin:  no rash  neurology:  no headache, no seizure, no change in mental status  psych: no anxiety, no depression         Allergies  No Known Allergies        ANTIMICROBIALS:  ceftolozane/tazobactam IVPB 450 every 8 hours      OTHER MEDS:  acetaminophen   Oral Liquid .. 650 milliGRAM(s) Oral every 6 hours PRN  albuterol/ipratropium for Nebulization 3 milliLiter(s) Nebulizer every 6 hours  chlorhexidine 0.12% Liquid 15 milliLiter(s) Oral Mucosa every 12 hours  chlorhexidine 2% Cloths 1 Application(s) Topical <User Schedule>  dextrose 5%. 1000 milliLiter(s) IV Continuous <Continuous>  dextrose 5%. 1000 milliLiter(s) IV Continuous <Continuous>  dextrose 50% Injectable 12.5 Gram(s) IV Push once  dextrose 50% Injectable 25 Gram(s) IV Push once  dextrose 50% Injectable 25 Gram(s) IV Push once  dextrose Oral Gel 15 Gram(s) Oral once PRN  epoetin reilly-epbx (RETACRIT) Injectable 78328 Unit(s) IV Push <User Schedule>  ferrous    sulfate Liquid 300 milliGRAM(s) Enteral Tube daily  glucagon  Injectable 1 milliGRAM(s) IntraMuscular once  heparin   Injectable 5000 Unit(s) SubCutaneous every 12 hours  hydrALAZINE 50 milliGRAM(s) Oral every 8 hours  insulin glargine Injectable (LANTUS) 15 Unit(s) SubCutaneous at bedtime  insulin lispro (ADMELOG) corrective regimen sliding scale   SubCutaneous every 6 hours  lactobacillus acidophilus 1 Tablet(s) Oral daily  Nephro-noam 1 Tablet(s) Oral daily  nystatin Powder 1 Application(s) Topical every 8 hours  pantoprazole   Suspension 40 milliGRAM(s) Enteral Tube daily  sodium chloride 3%  Inhalation 4 milliLiter(s) Inhalation every 6 hours      Vital Signs Last 24 Hrs  T(C): 36.7 (31 Oct 2024 10:30), Max: 37.2 (31 Oct 2024 05:05)  T(F): 98.1 (31 Oct 2024 10:30), Max: 98.9 (31 Oct 2024 05:05)  HR: 97 (31 Oct 2024 12:20) (74 - 97)  BP: 182/77 (31 Oct 2024 12:00) (126/69 - 186/82)  BP(mean): 112 (31 Oct 2024 12:00) (85 - 112)  RR: 15 (31 Oct 2024 10:30) (14 - 20)  SpO2: 95% (31 Oct 2024 12:20) (92% - 100%)    Parameters below as of 31 Oct 2024 06:41  Patient On (Oxygen Delivery Method): tracheostomy collar  O2 Flow (L/min): 30      Physical Exam:  General: Chronically ill appearing elderly man  HEENT: trached and on the vent, small amount of clear / white secretions around the tube    Cardio: regular, S1, S2,  no murmur  Respiratory:  Breath sounds largely clear today bilaterally, on vent  abd:  soft,   BS +,   no tenderness, + peg    Musculoskeletal:   no joint swelling,   no edema  Lines: +PIV, RUE AV fistula   Skin: warm dry skin  Neurologic: awake, at times follows with his eyes, nods at times, squeezes hand when asked  psych: unable     WBC Count: 9.94 K/uL (10-31 @ 03:10)  WBC Count: 13.68 K/uL (10-30 @ 02:40)  WBC Count: 13.79 K/uL (10-29 @ 03:00)  WBC Count: 10.57 K/uL (10-28 @ 02:32)  WBC Count: 12.19 K/uL (10-27 @ 02:29)  WBC Count: 14.27 K/uL (10-26 @ 04:27)  WBC Count: 14.89 K/uL (10-26 @ 02:15)                            8.7    9.94  )-----------( 232      ( 31 Oct 2024 03:10 )             27.8       10-31    135  |  96  |  73[H]  ----------------------------<  228[H]  4.2   |  27  |  4.46[H]    Ca    9.6      31 Oct 2024 04:10  Phos  4.5     10-31  Mg     2.5     10-31    TPro  7.5  /  Alb  1.8[L]  /  TBili  0.3  /  DBili  x   /  AST  28  /  ALT  16  /  AlkPhos  207[H]  10-31      Urinalysis Basic - ( 31 Oct 2024 04:10 )    Color: x / Appearance: x / SG: x / pH: x  Gluc: 228 mg/dL / Ketone: x  / Bili: x / Urobili: x   Blood: x / Protein: x / Nitrite: x   Leuk Esterase: x / RBC: x / WBC x   Sq Epi: x / Non Sq Epi: x / Bacteria: x        Creatinine Trend: 4.46<--, 3.26<--, 5.08<--, 4.21<--, 3.00<--, 4.04<--      MICROBIOLOGY:  v  .Blood BLOOD  10-28-24   No growth at 48 Hours  --  --      .Blood Blood  10-25-24   Culture is being performed.  --  --      Trach Asp Tracheal Aspirate  10-25-24   Moderate Pseudomonas aeruginosa (Carbapenem Resistant)  Commensal dominick consistent with body site  --  Pseudomonas aeruginosa (Carbapenem Resistant)      .Blood BLOOD  10-25-24   No growth at 5 days  --  --      .Blood BLOOD  10-18-24   No growth at 5 days  --  --      Bronchial  10-14-24   No acid-fast bacilli isolated at 2 weeks.  --  Pseudomonas aeruginosa      Bronchial  10-08-24   Few Pseudomonas aeruginosa  Moderate Stenotrophomonas maltophilia  --  Pseudomonas aeruginosa  Stenotrophomonas maltophilia      .Stool  10-08-24   No enteric pathogens isolated.  (Stool culture examined for Salmonella,  Shigella, Campylobacter, Aeromonas, Plesiomonas,  Vibrio, E.coli O157 and Yersinia)  --  --      .Blood BLOOD  10-07-24   No growth at 5 days  --  --      .Blood BLOOD  10-07-24   No growth at 5 days  --  --      .Blood BLOOD  10-04-24   No growth at 5 days  --  --      .Blood BLOOD  10-04-24   No growth at 5 days  --  --    Rapid RVP Result: NotDetec (10-25 @ 16:55)        SARS-CoV-2: NotDetec (25 Oct 2024 16:55)  SARS-CoV-2: NotDetec (11 Oct 2024 14:45)  SARS-CoV-2: NotDetec (08 Oct 2024 10:00)  COVID-19 PCR: Detected (09 Jul 2024 13:43)  COVID-19 PCR: NotDetec (07 Jul 2024 04:30)  SARS-CoV-2: NotDetec (29 Jun 2024 22:30)  COVID-19 PCR: NotDetec (14 Jun 2024 18:56)    RADIOLOGY:     JIMMY BLAND  MRN-[id number]  73y (1950)    Follow Up:  PNA,  organism     Interval History: The pt was seen and examined earlier on the medical floor, not in acute distress, awake and alert, nods in response to my questions, denies any pain, pt's wife is present. Pt is afebrile, vented via tracheostomy no WBC - normalized.    PAST MEDICAL & SURGICAL HISTORY:  ESRD on hemodialysis      HTN (hypertension)      Insulin dependent type 2 diabetes mellitus      History of cerebrovascular accident (CVA) with residual deficit      History of DVT of lower extremity      Arteriovenous fistula      S/P percutaneous endoscopic gastrostomy (PEG) tube placement      History of tracheostomy          ROS:  unclear how reliable the pt as a source of information, vented via trach, shakes his head denying any discomfort   [ ] Unobtainable because:  [ ] All other systems negative    Constitutional: no fever, no chills  Head: no trauma  Eyes: no vision changes, no eye pain  ENT:  no sore throat, no rhinorrhea  Cardiovascular:  no chest pain, no palpitation  Respiratory:  no SOB, no cough  GI:  no abd pain, no vomiting, no diarrhea  urinary: no dysuria, no hematuria, no flank pain  musculoskeletal:  no joint pain, no joint swelling  skin:  no rash  neurology:  no headache, no seizure, no change in mental status  psych: no anxiety, no depression         Allergies  No Known Allergies        ANTIMICROBIALS:  ceftolozane/tazobactam IVPB 450 every 8 hours      OTHER MEDS:  acetaminophen   Oral Liquid .. 650 milliGRAM(s) Oral every 6 hours PRN  albuterol/ipratropium for Nebulization 3 milliLiter(s) Nebulizer every 6 hours  chlorhexidine 0.12% Liquid 15 milliLiter(s) Oral Mucosa every 12 hours  chlorhexidine 2% Cloths 1 Application(s) Topical <User Schedule>  dextrose 5%. 1000 milliLiter(s) IV Continuous <Continuous>  dextrose 5%. 1000 milliLiter(s) IV Continuous <Continuous>  dextrose 50% Injectable 12.5 Gram(s) IV Push once  dextrose 50% Injectable 25 Gram(s) IV Push once  dextrose 50% Injectable 25 Gram(s) IV Push once  dextrose Oral Gel 15 Gram(s) Oral once PRN  epoetin reilly-epbx (RETACRIT) Injectable 61117 Unit(s) IV Push <User Schedule>  ferrous    sulfate Liquid 300 milliGRAM(s) Enteral Tube daily  glucagon  Injectable 1 milliGRAM(s) IntraMuscular once  heparin   Injectable 5000 Unit(s) SubCutaneous every 12 hours  hydrALAZINE 50 milliGRAM(s) Oral every 8 hours  insulin glargine Injectable (LANTUS) 15 Unit(s) SubCutaneous at bedtime  insulin lispro (ADMELOG) corrective regimen sliding scale   SubCutaneous every 6 hours  lactobacillus acidophilus 1 Tablet(s) Oral daily  Nephro-noam 1 Tablet(s) Oral daily  nystatin Powder 1 Application(s) Topical every 8 hours  pantoprazole   Suspension 40 milliGRAM(s) Enteral Tube daily  sodium chloride 3%  Inhalation 4 milliLiter(s) Inhalation every 6 hours      Vital Signs Last 24 Hrs  T(C): 36.7 (31 Oct 2024 10:30), Max: 37.2 (31 Oct 2024 05:05)  T(F): 98.1 (31 Oct 2024 10:30), Max: 98.9 (31 Oct 2024 05:05)  HR: 97 (31 Oct 2024 12:20) (74 - 97)  BP: 182/77 (31 Oct 2024 12:00) (126/69 - 186/82)  BP(mean): 112 (31 Oct 2024 12:00) (85 - 112)  RR: 15 (31 Oct 2024 10:30) (14 - 20)  SpO2: 95% (31 Oct 2024 12:20) (92% - 100%)    Parameters below as of 31 Oct 2024 06:41  Patient On (Oxygen Delivery Method): tracheostomy collar  O2 Flow (L/min): 30      Physical Exam:  General: Chronically ill appearing elderly man  HEENT: trached and on the vent, small amount of clear / white secretions around the tube    Cardio: regular, S1, S2,  no murmur  Respiratory:  Breath sounds largely clear today bilaterally, on vent  abd:  soft,   BS +,   no tenderness, + peg    Musculoskeletal:   no joint swelling,   no edema  Lines: +PIV, RUE AV fistula   Skin: warm dry skin  Neurologic: awake, at times follows with his eyes, nods at times, squeezes hand when asked  psych: unable     WBC Count: 9.94 K/uL (10-31 @ 03:10)  WBC Count: 13.68 K/uL (10-30 @ 02:40)  WBC Count: 13.79 K/uL (10-29 @ 03:00)  WBC Count: 10.57 K/uL (10-28 @ 02:32)  WBC Count: 12.19 K/uL (10-27 @ 02:29)  WBC Count: 14.27 K/uL (10-26 @ 04:27)  WBC Count: 14.89 K/uL (10-26 @ 02:15)                            8.7    9.94  )-----------( 232      ( 31 Oct 2024 03:10 )             27.8       10-31    135  |  96  |  73[H]  ----------------------------<  228[H]  4.2   |  27  |  4.46[H]    Ca    9.6      31 Oct 2024 04:10  Phos  4.5     10-31  Mg     2.5     10-31    TPro  7.5  /  Alb  1.8[L]  /  TBili  0.3  /  DBili  x   /  AST  28  /  ALT  16  /  AlkPhos  207[H]  10-31      Urinalysis Basic - ( 31 Oct 2024 04:10 )    Color: x / Appearance: x / SG: x / pH: x  Gluc: 228 mg/dL / Ketone: x  / Bili: x / Urobili: x   Blood: x / Protein: x / Nitrite: x   Leuk Esterase: x / RBC: x / WBC x   Sq Epi: x / Non Sq Epi: x / Bacteria: x        Creatinine Trend: 4.46<--, 3.26<--, 5.08<--, 4.21<--, 3.00<--, 4.04<--      MICROBIOLOGY:  v  .Blood BLOOD  10-28-24   No growth at 48 Hours  --  --      .Blood Blood  10-25-24   Culture is being performed.  --  --      Trach Asp Tracheal Aspirate  10-25-24   Moderate Pseudomonas aeruginosa (Carbapenem Resistant)  Commensal dominick consistent with body site  --  Pseudomonas aeruginosa (Carbapenem Resistant)      .Blood BLOOD  10-25-24   No growth at 5 days  --  --      .Blood BLOOD  10-18-24   No growth at 5 days  --  --      Bronchial  10-14-24   No acid-fast bacilli isolated at 2 weeks.  --  Pseudomonas aeruginosa      Bronchial  10-08-24   Few Pseudomonas aeruginosa  Moderate Stenotrophomonas maltophilia  --  Pseudomonas aeruginosa  Stenotrophomonas maltophilia      .Stool  10-08-24   No enteric pathogens isolated.  (Stool culture examined for Salmonella,  Shigella, Campylobacter, Aeromonas, Plesiomonas,  Vibrio, E.coli O157 and Yersinia)  --  --      .Blood BLOOD  10-07-24   No growth at 5 days  --  --      .Blood BLOOD  10-07-24   No growth at 5 days  --  --      .Blood BLOOD  10-04-24   No growth at 5 days  --  --      .Blood BLOOD  10-04-24   No growth at 5 days  --  --    Rapid RVP Result: NotDetec (10-25 @ 16:55)        SARS-CoV-2: NotDetec (25 Oct 2024 16:55)  SARS-CoV-2: NotDetec (11 Oct 2024 14:45)  SARS-CoV-2: NotDetec (08 Oct 2024 10:00)  COVID-19 PCR: Detected (09 Jul 2024 13:43)  COVID-19 PCR: NotDetec (07 Jul 2024 04:30)  SARS-CoV-2: NotDetec (29 Jun 2024 22:30)  COVID-19 PCR: NotDetec (14 Jun 2024 18:56)    RADIOLOGY:

## 2024-10-31 NOTE — PROGRESS NOTE ADULT - ASSESSMENT
Megha Art is a 73 year old male with PMHx of HTN, IDDM2, ESRD on HD (M/W/F, through RUE AV fistula), hx of CVA x 2 (with residual R. sided weakness and dysphagia s/p PEG tube, previously with tracheostomy and now removed), and hx of RLE DVT (completed apixaban course) who presented to the ED on 10/4/24 for complaints of cough and chills. In the ED, Tmax 101.3, HR as elevated as 118, BP as elevated as 201/68, and SpO2 as low as 91% on room air. Initially placed on 15L NRB with improvement of SpO2 to 95%. Now on room air. WBC 17.38K, hgb 9.9, sodium 129, BUN 61, Cr 4.05, alkphos 169. Lactic acid 3.6. CT head without evidence of acute hemorrhage mass or mass effect but with encephalomalacia and gliosis again seen involving the bifrontal region. CT CAP with pneumonia and rectum distended with stool. Received  cc bolus, vancomycin 1 g IV, zosyn 3.375 g IV, acetaminophen 1 g IV, hydralazine 10 mg IV, pantoprazole 40 mg IV, and ondansetron 4 mg IV. Evaluated by nephrology who is planning for HD tomorrow. (04 Oct 2024 22:03)    Hospital course has been complicated with long ICU stay, failed trial of extubation 10/11, reintubation on 10/14. He is s/p bronch x 2. BAL with PsAg and Stenotrophomonas. He was on Pip-deb from 10/4-->10/15. Switched to Levofloxacin IV on 10/16.  ID was re-called last week for antibiotics management.      10/9: intubated, sedated, getting hemodialysis again today, continue high dose zosyn while awaiting cultures, cultures negative thus far, s/p bronch and will follow those cultures and adjust antibiotics accordingly   10/10: Afebrile, Intubated, sedated. Noted with Hgb of 5.9 this am. Improved tracheal secretions per RN. On minimal vent settings, possible extubation trial today. HD tomorrow per renal. No growth on blood cultures. Bronch culture with Pseudomonas aeruginosa, pending susceptibilities.  10/11: extubated but wheezing, mucous plug, aggressive PT, awaiting pseudomonas susceptibilities    10/18: Afebrile today. T-max 100.6 F - 100.9 F last 2 days. Remains intubated, on minimal sedation, and one vasopressor. Basurto an episode of desaturation. He is on 90%FiO2. On HD today. No excessive oral secretions or diarrhea as per bedside RN. PsAg and Steno susceptibilities reviewed. MRSA screen negative on 10/10  10/21: nearing end of antibiotics, continue agressive pulmonary toilet, tracheostomy likely this week  10/25-  febrile  t-max-100.9  leukocytosis 15 k decreased from 19K  10/28: remains in ICU, downgraded, awaiting for bed, grew  pseudomonas in trach aspirate, on isolation now, + Fevers with Tmax 102.2, vented via tracheostomy, WBC better 10.57, LFTS ok, BCs NGTD, will obtain two sets of BCs, then Zerbaxa IV started. RVP negative.   10/29: low grade fever today only started (Zerbaxa) ceftolozane/tazobactam yesterday afternoon and will continue, breathing well on PS, hemodialysis today, wbc fluctuates between 10-15 now at 13.79  10/30: no fevers within 24 hrs, vented via tracheostomy, WBC same 13.68, Cr better 2.26, all BCs NGTD, Zerbaxa continued (day #3).   10/31: afebrile, vented via trach., WBC normalized, LFTs ok, all BCs NGTD, today is day #4 of Zerbaxa.       Impression:  1. Multifocal pneumonia- Dense RLL consolidation  2. Acute respiratory failure requiring intubation  3. Septic shock requiring vasopressor support  4. Rising leukocytosis and low grade fevers  5. ESRD on HD. Prior CVA, s/p PEG, Prior trach and decannulation, IDDM2   6.  Pseudomonas infection       Recommendations:  -completed levaquin treatment for pseudomonas pneumonia on 10/21  - became febrile with slightly worse secretions   - started (Zerbaxa) ceftolozane/tazobactam to cover carbapenem-resistant pseudomonas (Day #4)  - follow all culture data   - vent management as per medicine team   -Off loading, frequent turns, nutritional optimization to prevent bed sores  -Continue hemodialysis as per renal   - wound care    will discuss with Dr. Magallon     Megha Art is a 73 year old male with PMHx of HTN, IDDM2, ESRD on HD (M/W/F, through RUE AV fistula), hx of CVA x 2 (with residual R. sided weakness and dysphagia s/p PEG tube, previously with tracheostomy and now removed), and hx of RLE DVT (completed apixaban course) who presented to the ED on 10/4/24 for complaints of cough and chills. In the ED, Tmax 101.3, HR as elevated as 118, BP as elevated as 201/68, and SpO2 as low as 91% on room air. Initially placed on 15L NRB with improvement of SpO2 to 95%. Now on room air. WBC 17.38K, hgb 9.9, sodium 129, BUN 61, Cr 4.05, alkphos 169. Lactic acid 3.6. CT head without evidence of acute hemorrhage mass or mass effect but with encephalomalacia and gliosis again seen involving the bifrontal region. CT CAP with pneumonia and rectum distended with stool. Received  cc bolus, vancomycin 1 g IV, zosyn 3.375 g IV, acetaminophen 1 g IV, hydralazine 10 mg IV, pantoprazole 40 mg IV, and ondansetron 4 mg IV. Evaluated by nephrology who is planning for HD tomorrow. (04 Oct 2024 22:03)    Hospital course has been complicated with long ICU stay, failed trial of extubation 10/11, reintubation on 10/14. He is s/p bronch x 2. BAL with PsAg and Stenotrophomonas. He was on Pip-deb from 10/4-->10/15. Switched to Levofloxacin IV on 10/16.  ID was re-called last week for antibiotics management.      10/9: intubated, sedated, getting hemodialysis again today, continue high dose zosyn while awaiting cultures, cultures negative thus far, s/p bronch and will follow those cultures and adjust antibiotics accordingly   10/10: Afebrile, Intubated, sedated. Noted with Hgb of 5.9 this am. Improved tracheal secretions per RN. On minimal vent settings, possible extubation trial today. HD tomorrow per renal. No growth on blood cultures. Bronch culture with Pseudomonas aeruginosa, pending susceptibilities.  10/11: extubated but wheezing, mucous plug, aggressive PT, awaiting pseudomonas susceptibilities    10/18: Afebrile today. T-max 100.6 F - 100.9 F last 2 days. Remains intubated, on minimal sedation, and one vasopressor. Basurto an episode of desaturation. He is on 90%FiO2. On HD today. No excessive oral secretions or diarrhea as per bedside RN. PsAg and Steno susceptibilities reviewed. MRSA screen negative on 10/10  10/21: nearing end of antibiotics, continue agressive pulmonary toilet, tracheostomy likely this week  10/25-  febrile  t-max-100.9  leukocytosis 15 k decreased from 19K  10/28: remains in ICU, downgraded, awaiting for bed, grew  pseudomonas in trach aspirate, on isolation now, + Fevers with Tmax 102.2, vented via tracheostomy, WBC better 10.57, LFTS ok, BCs NGTD, will obtain two sets of BCs, then Zerbaxa IV started. RVP negative.   10/29: low grade fever today only started (Zerbaxa) ceftolozane/tazobactam yesterday afternoon and will continue, breathing well on PS, hemodialysis today, wbc fluctuates between 10-15 now at 13.79  10/30: no fevers within 24 hrs, vented via tracheostomy, WBC same 13.68, Cr better 2.26, all BCs NGTD, Zerbaxa continued (day #3).   10/31: afebrile, vented via trach., WBC normalized, LFTs ok, all BCs NGTD, today is day #4 of Zerbaxa.       Impression:  1. Multifocal pneumonia- Dense RLL consolidation  2. Acute respiratory failure requiring intubation  3. Septic shock requiring vasopressor support  4. Rising leukocytosis and low grade fevers  5. ESRD on HD. Prior CVA, s/p PEG, Prior trach and decannulation, IDDM2   6.  Pseudomonas infection       Recommendations:  - completed levaquin treatment for pseudomonas pneumonia on 10/21  - became febrile with slightly worse secretions   - started (Zerbaxa) ceftolozane/tazobactam to cover carbapenem-resistant pseudomonas (Day #4/7)  - follow all culture data   - vent management as per medicine team   -Off loading, frequent turns, nutritional optimization to prevent bed sores  -Continue hemodialysis as per renal   - wound care

## 2024-10-31 NOTE — PROGRESS NOTE ADULT - NS ATTEND AMEND GEN_ALL_CORE FT
pt seen and examined at bedside with NP    afebrile today  fever free x2 days  tolerating pressure support today on 10/5  copious thick yellow/green tracheal secretions suctioned and retrieved today from trach with similar colored secretions noted coming from around trach site itself  bilateral rhonchi on lung exam  awake, tracks with eyes, nods to simple yes/no questions      73M PMH HTN, DM, ESRD on HD (RUE AVF), CVA 5/2024 with residual R weakness and dysphagia s/p PEG (5/17/24) and trach (5/14/24) subsequently decannulated 7/5/24, R common iliac DVT (5/2024) s/p course of apixiban, COVID PNA 7/2024 with hypoxia requiring high flow (s/p treatment with remdesivir and dexamethasone, weaned off O2 supplementation) presents with cough and sputum production. Febrile in ED with noted leukocytosis. CT chest showed RLL pneumonia. Course with worsening acute hypoxemic respiratory failure, RRTs for hypoxia. Upgraded to ICU 10/8 for worsening hypoxemia high flow -> BiPAP. Intubated 10/8 for hypoxia due to PNA and thick secretions. s/p bronchoscopy 10/8: BAL cx with pseudomonas and Stenotrophomonas. Weaned and extubated 10/10, failed extubation and reintubated for hypoxia 10/14 s/p repeat bronchoscopy with thick secretions retrieved cultures with pseudomonas. s/p trach 10/23. Downgraded to medical service 10/24. Course with fevers. Sputum cx 10/25 with carbapenem resistant pseudomonas.     DX: acute hypoxic respiratory failure requiring intubation/reintubation, failure to wean s/p trach, gram negative PNA (pseudomonas and stenotrophomonas), carbapenem resistant pseudomonas infection, ESRD on HD, HTN, sepsis present on admission    - on zerbaxa (day #4) for carbapenem resistant pseudomonas PNA, antibx per ID  - s/p levaquin 10/16 - 10/22 for pseudomonas/stenotrophomonas in bronch cx  - s/p bactrim 10/12 - 10/16 for stenotrophomonas  - s/p zosyn 10/4 - 10/8  - cont daily weaning with pressure support as tolerates. tolerating PS 10/5 to wean today.   - rest on full vent support at night  - cont duonebs q6 hrs and hypersal nebs for impaired secretion mobilization  - HD per nephrology  - s/p trach POD #8 (#7 portex in place)  - local trach care  - tracheal suctioning as needed  - DVT ppx: on heparin sc  - remainder of care per primary team  - full code  - d/w bedside nurse

## 2024-10-31 NOTE — PROGRESS NOTE ADULT - SUBJECTIVE AND OBJECTIVE BOX
BALDO JIMMY  73y  Patient is a 73y old  Male who presents with a chief complaint of Sepsis and acute hypoxic respiratory failure secondary to suspected aspiration PNA vs. HCAP (31 Oct 2024 14:51)    HPI:  Awaiting placement at [facility].  HD today.    HEALTH ISSUES - PROBLEM Dx:  Sepsis due to pneumonia    Acute respiratory failure with hypoxia    Hypertensive urgency    Constipation    Lactic acidosis    Insulin dependent type 2 diabetes mellitus    ESRD on hemodialysis    History of cerebrovascular accident (CVA) with residual deficit    History of DVT of lower extremity    Microcytic anemia    Hypoxia    Severe protein-calorie malnutrition    Encounter for palliative care          MEDICATIONS  (STANDING):  albuterol/ipratropium for Nebulization 3 milliLiter(s) Nebulizer every 6 hours  ceftolozane/tazobactam IVPB 450 milliGRAM(s) IV Intermittent every 8 hours  chlorhexidine 0.12% Liquid 15 milliLiter(s) Oral Mucosa every 12 hours  chlorhexidine 2% Cloths 1 Application(s) Topical <User Schedule>  dextrose 5%. 1000 milliLiter(s) (50 mL/Hr) IV Continuous <Continuous>  dextrose 5%. 1000 milliLiter(s) (100 mL/Hr) IV Continuous <Continuous>  dextrose 50% Injectable 12.5 Gram(s) IV Push once  dextrose 50% Injectable 25 Gram(s) IV Push once  dextrose 50% Injectable 25 Gram(s) IV Push once  epoetin reilly-epbx (RETACRIT) Injectable 58174 Unit(s) IV Push <User Schedule>  ferrous    sulfate Liquid 300 milliGRAM(s) Enteral Tube daily  glucagon  Injectable 1 milliGRAM(s) IntraMuscular once  heparin   Injectable 5000 Unit(s) SubCutaneous every 12 hours  hydrALAZINE 50 milliGRAM(s) Oral every 8 hours  insulin glargine Injectable (LANTUS) 15 Unit(s) SubCutaneous at bedtime  insulin lispro (ADMELOG) corrective regimen sliding scale   SubCutaneous every 6 hours  lactobacillus acidophilus 1 Tablet(s) Oral daily  Nephro-noam 1 Tablet(s) Oral daily  nystatin Powder 1 Application(s) Topical every 8 hours  pantoprazole   Suspension 40 milliGRAM(s) Enteral Tube daily  sodium chloride 3%  Inhalation 4 milliLiter(s) Inhalation every 6 hours    MEDICATIONS  (PRN):  acetaminophen   Oral Liquid .. 650 milliGRAM(s) Oral every 6 hours PRN Temp greater or equal to 38.5C (101.3F)  dextrose Oral Gel 15 Gram(s) Oral once PRN Blood Glucose LESS THAN 70 milliGRAM(s)/deciliter    Vital Signs Last 24 Hrs  T(C): 36.8 (31 Oct 2024 16:00), Max: 37.2 (31 Oct 2024 05:05)  T(F): 98.2 (31 Oct 2024 16:00), Max: 98.9 (31 Oct 2024 05:05)  HR: 97 (31 Oct 2024 16:09) (74 - 100)  BP: 144/74 (31 Oct 2024 16:00) (135/72 - 186/82)  BP(mean): 112 (31 Oct 2024 12:00) (89 - 112)  RR: 15 (31 Oct 2024 10:30) (14 - 20)  SpO2: 97% (31 Oct 2024 16:09) (92% - 100%)    Parameters below as of 31 Oct 2024 06:41  Patient On (Oxygen Delivery Method): tracheostomy collar  O2 Flow (L/min): 30    Daily     Daily     PHYSICAL EXAM:  Constitutional:  He appears comfortable and not distressed. Not diaphoretic.    Neck:  The thyroid is normal. Trachea is midline.     Respiratory: The lungs are clear to auscultation. No dullness and expansion is normal.    Cardiovascular: S1 and S2 are normal. No mummurs, rubs or gallops are present.    Gastrointestinal: The abdomen is soft. No tenderness is present. No masses are present.     Genitourinary: The bladder is not distended. No CVA tenderness is present.    Extremities: No edema is noted. No deformities are present.    Neurological: Cognition is normal. Tone, power and sensation are normal. Gait is steady.    Skin: No lesions are seen  or palpated.    Lymph Nodes: No lymphadenopathy is present.                                8.7    9.94  )-----------( 232      ( 31 Oct 2024 03:10 )             27.8     10-31    135  |  96  |  73[H]  ----------------------------<  228[H]  4.2   |  27  |  4.46[H]    Ca    9.6      31 Oct 2024 04:10  Phos  4.5     10-31  Mg     2.5     10-31    TPro  7.5  /  Alb  1.8[L]  /  TBili  0.3  /  DBili  x   /  AST  28  /  ALT  16  /  AlkPhos  207[H]  10-31

## 2024-10-31 NOTE — PROGRESS NOTE ADULT - SUBJECTIVE AND OBJECTIVE BOX
Interval Events: No acute events. SpO2 stable on 30% FiO2 on full vent support      REVIEW OF SYSTEMS:  All negative unless listed within interval HPI     OBJECTIVE:   Vital Signs Last 24 Hrs  T(C): 36.8 (31 Oct 2024 06:41), Max: 37.2 (31 Oct 2024 05:05)  T(F): 98.2 (31 Oct 2024 06:41), Max: 98.9 (31 Oct 2024 05:05)  HR: 80 (31 Oct 2024 07:00) (74 - 86)  BP: 157/82 (31 Oct 2024 06:41) (126/69 - 157/82)  BP(mean): 97 (31 Oct 2024 04:00) (85 - 97)  ABP: --  ABP(mean): --  RR: 18 (31 Oct 2024 06:41) (14 - 20)  SpO2: 93% (31 Oct 2024 06:41) (93% - 100%)    O2 Parameters below as of 31 Oct 2024 06:41  Patient On (Oxygen Delivery Method): tracheostomy collar  O2 Flow (L/min): 30        Mode: AC/ CMV (Assist Control/ Continuous Mandatory Ventilation), RR (machine): 16, TV (machine): 400, FiO2: 30, PEEP: 5, ITime: 0.9, MAP: 8, PIP: 17    10-30 @ 07:01  -  10-31 @ 07:00  --------------------------------------------------------  IN: 800 mL / OUT: 250 mL / NET: 550 mL      CAPILLARY BLOOD GLUCOSE      POCT Blood Glucose.: 226 mg/dL (31 Oct 2024 05:10)      PHYSICAL EXAM:  General: thin cachectic, chronically ill appearing, NAD  HEENT: Sclera clear, PERRL  Neck: +trach   Respiratory: mechanical BS bilat   Cardiovascular: S1S2 RRR  Abdomen: soft + BS + PEG  Extremities: warm, no LE edema   Neurological: arousable to voice, follows commands    HOSPITAL MEDICATIONS:  heparin   Injectable 5000 Unit(s) SubCutaneous every 12 hours    ceftolozane/tazobactam IVPB 450 milliGRAM(s) IV Intermittent every 8 hours      dextrose 50% Injectable 12.5 Gram(s) IV Push once  dextrose 50% Injectable 25 Gram(s) IV Push once  dextrose 50% Injectable 25 Gram(s) IV Push once  dextrose Oral Gel 15 Gram(s) Oral once PRN  glucagon  Injectable 1 milliGRAM(s) IntraMuscular once  insulin glargine Injectable (LANTUS) 15 Unit(s) SubCutaneous at bedtime  insulin lispro (ADMELOG) corrective regimen sliding scale   SubCutaneous every 6 hours    albuterol/ipratropium for Nebulization 3 milliLiter(s) Nebulizer every 6 hours  sodium chloride 3%  Inhalation 4 milliLiter(s) Inhalation every 6 hours    acetaminophen   Oral Liquid .. 650 milliGRAM(s) Oral every 6 hours PRN    pantoprazole   Suspension 40 milliGRAM(s) Enteral Tube daily        dextrose 5%. 1000 milliLiter(s) IV Continuous <Continuous>  dextrose 5%. 1000 milliLiter(s) IV Continuous <Continuous>  ferrous    sulfate Liquid 300 milliGRAM(s) Enteral Tube daily  Nephro-noam 1 Tablet(s) Oral daily    epoetin reilly-epbx (RETACRIT) Injectable 08727 Unit(s) IV Push <User Schedule>    chlorhexidine 0.12% Liquid 15 milliLiter(s) Oral Mucosa every 12 hours  chlorhexidine 2% Cloths 1 Application(s) Topical <User Schedule>  nystatin Powder 1 Application(s) Topical every 8 hours    lactobacillus acidophilus 1 Tablet(s) Oral daily      LABS:                        8.7    9.94  )-----------( 232      ( 31 Oct 2024 03:10 )             27.8     Hgb Trend: 8.7<--, 8.7<--, 8.8<--, 9.1<--, 8.6<--  10-31    135  |  96  |  73[H]  ----------------------------<  228[H]  4.2   |  27  |  4.46[H]    Ca    9.6      31 Oct 2024 04:10  Phos  4.5     10-31  Mg     2.5     10-31    TPro  7.5  /  Alb  1.8[L]  /  TBili  0.3  /  DBili  x   /  AST  28  /  ALT  16  /  AlkPhos  207[H]  10-31    Creatinine Trend: 4.46<--, 3.26<--, 5.08<--, 4.21<--, 3.00<--, 4.04<--    Urinalysis Basic - ( 31 Oct 2024 04:10 )    Color: x / Appearance: x / SG: x / pH: x  Gluc: 228 mg/dL / Ketone: x  / Bili: x / Urobili: x   Blood: x / Protein: x / Nitrite: x   Leuk Esterase: x / RBC: x / WBC x   Sq Epi: x / Non Sq Epi: x / Bacteria: x            MICROBIOLOGY:   Culture - Blood (collected 10-28-24 @ 15:00)  Source: .Blood BLOOD  Preliminary Report (10-30-24 @ 01:01):    No growth at 24 hours    Culture - Blood (collected 10-28-24 @ 15:00)  Source: .Blood BLOOD  Preliminary Report (10-30-24 @ 01:01):    No growth at 24 hours    Culture - Blood (collected 10-25-24 @ 16:55)  Source: .Blood BLOOD  Preliminary Report (10-29-24 @ 23:00):    No growth at 4 days    Culture - Blood (collected 10-25-24 @ 16:55)  Source: .Blood BLOOD  Preliminary Report (10-29-24 @ 23:00):    No growth at 4 days    Culture - Blood (collected 10-18-24 @ 11:10)  Source: .Blood BLOOD  Final Report (10-23-24 @ 15:01):    No growth at 5 days    Culture - Blood (collected 10-07-24 @ 18:35)  Source: .Blood BLOOD  Final Report (10-13-24 @ 01:01):    No growth at 5 days    Culture - Blood (collected 10-07-24 @ 18:30)  Source: .Blood BLOOD  Final Report (10-13-24 @ 01:01):    No growth at 5 days    Culture - Blood (collected 10-04-24 @ 16:40)  Source: .Blood BLOOD  Final Report (10-10-24 @ 02:00):    No growth at 5 days    Culture - Blood (collected 10-04-24 @ 16:30)  Source: .Blood BLOOD  Final Report (10-10-24 @ 02:00):    No growth at 5 days        Culture - Sputum (collected 10-25-24 @ 17:30)  Source: Trach Asp Tracheal Aspirate  Gram Stain (10-27-24 @ 17:35):    Few polymorphonuclear leukocytes per low power field    Rare Squamous epithelial cells per low power field    Few Gram Negative Rods per oil power field  Final Report (10-27-24 @ 17:35):    Moderate Pseudomonas aeruginosa (Carbapenem Resistant)    Commensal dominick consistent with body site  Organism: Pseudomonas aeruginosa (Carbapenem Resistant) (10-27-24 @ 17:35)  Organism: Pseudomonas aeruginosa (Carbapenem Resistant) (10-27-24 @ 17:35)      Method Type: CarbaR      -  Resistance Gene to Carbapenem: Nondet  Organism: Pseudomonas aeruginosa (Carbapenem Resistant) (10-27-24 @ 17:35)      Method Type: VIDA      -  Aztreonam: R >16      -  Cefepime: I 16      -  Ceftazidime: R >16      -  Ceftazidime/Avibactam: S <=4      -  Ceftolozane/tazobactam: S 4      -  Ciprofloxacin: R >2      -  Imipenem: R 8      -  Levofloxacin: R >4      -  Meropenem: I 4      -  Piperacillin/Tazobactam: R >64      Culture - Bronchial (10.14.24 @ 11:20)    -  Aztreonam: I 16   -  Cefepime: I 16   -  Ceftazidime: R >16   -  Piperacillin/Tazobactam: R 64   -  Imipenem: S 2   -  Levofloxacin: S <=0.5   -  Ciprofloxacin: S <=0.25   -  Meropenem: S <=1   Gram Stain:   Moderate polymorphonuclear leukocytes per low power field  Rare Squamous epithelial cells per low power field  Few Gram Negative Rods per oil power field   Specimen Source: Bronchial   Culture Results:   Few Pseudomonas aeruginosa  Commensal dominick consistent with body site   Organism Identification: Pseudomonas aeruginosa   Organism: Pseudomonas aeruginosa   Method Type: VIDA      Culture - Bronchial (10.08.24 @ 20:40)    -  Minocycline: S   -  Piperacillin/Tazobactam: S <=8   -  Trimethoprim/Sulfamethoxazole: S <=0.5/9.5   -  Imipenem: S 2   -  Ciprofloxacin: S <=0.25   -  Levofloxacin: S <=0.5   -  Levofloxacin: S <=0.5   -  Meropenem: S <=1   -  Aztreonam: S <=4   -  Cefepime: S <=2   -  Ceftazidime: S <=1   Gram Stain:   Moderate polymorphonuclear leukocytes seen per low power field  Few Squamous epithelial cells seen per low power field  Few Gram Negative Rods seen per oil power field   Specimen Source: Bronchial   Culture Results:   Few Pseudomonas aeruginosa  Moderate Stenotrophomonas maltophilia   Organism Identification: Pseudomonas aeruginosa  Stenotrophomonas maltophilia   Organism: Pseudomonas aeruginosa   Organism: Stenotrophomonas maltophilia   Organism: Stenotrophomonas maltophilia   Method Type: KB   Method Type: VIDA   Method Type: VIDA            RADIOLOGY & ADDITIONAL TESTS: Reviewed.   Interval Events: No acute events. SpO2 stable on 30% FiO2 on full vent support, tolerating PS 10/5/35% today. Thick copious secretions suctioned at bedside.       REVIEW OF SYSTEMS:  All negative unless listed within interval HPI     OBJECTIVE:   Vital Signs Last 24 Hrs  T(C): 36.8 (31 Oct 2024 06:41), Max: 37.2 (31 Oct 2024 05:05)  T(F): 98.2 (31 Oct 2024 06:41), Max: 98.9 (31 Oct 2024 05:05)  HR: 80 (31 Oct 2024 07:00) (74 - 86)  BP: 157/82 (31 Oct 2024 06:41) (126/69 - 157/82)  BP(mean): 97 (31 Oct 2024 04:00) (85 - 97)  ABP: --  ABP(mean): --  RR: 18 (31 Oct 2024 06:41) (14 - 20)  SpO2: 93% (31 Oct 2024 06:41) (93% - 100%)    O2 Parameters below as of 31 Oct 2024 06:41  Patient On (Oxygen Delivery Method): tracheostomy collar  O2 Flow (L/min): 30        Mode: AC/ CMV (Assist Control/ Continuous Mandatory Ventilation), RR (machine): 16, TV (machine): 400, FiO2: 30, PEEP: 5, ITime: 0.9, MAP: 8, PIP: 17    10-30 @ 07:01  -  10-31 @ 07:00  --------------------------------------------------------  IN: 800 mL / OUT: 250 mL / NET: 550 mL      CAPILLARY BLOOD GLUCOSE      POCT Blood Glucose.: 226 mg/dL (31 Oct 2024 05:10)      PHYSICAL EXAM:  General: thin cachectic, chronically ill appearing, NAD  HEENT: Sclera clear, PERRL  Neck: +trach   Respiratory: mechanical BS bilat   Cardiovascular: S1S2 RRR  Abdomen: soft + BS + PEG  Extremities: warm, no LE edema   Neurological: arousable to voice, follows commands    HOSPITAL MEDICATIONS:  heparin   Injectable 5000 Unit(s) SubCutaneous every 12 hours    ceftolozane/tazobactam IVPB 450 milliGRAM(s) IV Intermittent every 8 hours      dextrose 50% Injectable 12.5 Gram(s) IV Push once  dextrose 50% Injectable 25 Gram(s) IV Push once  dextrose 50% Injectable 25 Gram(s) IV Push once  dextrose Oral Gel 15 Gram(s) Oral once PRN  glucagon  Injectable 1 milliGRAM(s) IntraMuscular once  insulin glargine Injectable (LANTUS) 15 Unit(s) SubCutaneous at bedtime  insulin lispro (ADMELOG) corrective regimen sliding scale   SubCutaneous every 6 hours    albuterol/ipratropium for Nebulization 3 milliLiter(s) Nebulizer every 6 hours  sodium chloride 3%  Inhalation 4 milliLiter(s) Inhalation every 6 hours    acetaminophen   Oral Liquid .. 650 milliGRAM(s) Oral every 6 hours PRN    pantoprazole   Suspension 40 milliGRAM(s) Enteral Tube daily        dextrose 5%. 1000 milliLiter(s) IV Continuous <Continuous>  dextrose 5%. 1000 milliLiter(s) IV Continuous <Continuous>  ferrous    sulfate Liquid 300 milliGRAM(s) Enteral Tube daily  Nephro-noam 1 Tablet(s) Oral daily    epoetin reilly-epbx (RETACRIT) Injectable 51022 Unit(s) IV Push <User Schedule>    chlorhexidine 0.12% Liquid 15 milliLiter(s) Oral Mucosa every 12 hours  chlorhexidine 2% Cloths 1 Application(s) Topical <User Schedule>  nystatin Powder 1 Application(s) Topical every 8 hours    lactobacillus acidophilus 1 Tablet(s) Oral daily      LABS:                        8.7    9.94  )-----------( 232      ( 31 Oct 2024 03:10 )             27.8     Hgb Trend: 8.7<--, 8.7<--, 8.8<--, 9.1<--, 8.6<--  10-31    135  |  96  |  73[H]  ----------------------------<  228[H]  4.2   |  27  |  4.46[H]    Ca    9.6      31 Oct 2024 04:10  Phos  4.5     10-31  Mg     2.5     10-31    TPro  7.5  /  Alb  1.8[L]  /  TBili  0.3  /  DBili  x   /  AST  28  /  ALT  16  /  AlkPhos  207[H]  10-31    Creatinine Trend: 4.46<--, 3.26<--, 5.08<--, 4.21<--, 3.00<--, 4.04<--    Urinalysis Basic - ( 31 Oct 2024 04:10 )    Color: x / Appearance: x / SG: x / pH: x  Gluc: 228 mg/dL / Ketone: x  / Bili: x / Urobili: x   Blood: x / Protein: x / Nitrite: x   Leuk Esterase: x / RBC: x / WBC x   Sq Epi: x / Non Sq Epi: x / Bacteria: x            MICROBIOLOGY:   Culture - Blood (collected 10-28-24 @ 15:00)  Source: .Blood BLOOD  Preliminary Report (10-30-24 @ 01:01):    No growth at 24 hours    Culture - Blood (collected 10-28-24 @ 15:00)  Source: .Blood BLOOD  Preliminary Report (10-30-24 @ 01:01):    No growth at 24 hours    Culture - Blood (collected 10-25-24 @ 16:55)  Source: .Blood BLOOD  Preliminary Report (10-29-24 @ 23:00):    No growth at 4 days    Culture - Blood (collected 10-25-24 @ 16:55)  Source: .Blood BLOOD  Preliminary Report (10-29-24 @ 23:00):    No growth at 4 days    Culture - Blood (collected 10-18-24 @ 11:10)  Source: .Blood BLOOD  Final Report (10-23-24 @ 15:01):    No growth at 5 days    Culture - Blood (collected 10-07-24 @ 18:35)  Source: .Blood BLOOD  Final Report (10-13-24 @ 01:01):    No growth at 5 days    Culture - Blood (collected 10-07-24 @ 18:30)  Source: .Blood BLOOD  Final Report (10-13-24 @ 01:01):    No growth at 5 days    Culture - Blood (collected 10-04-24 @ 16:40)  Source: .Blood BLOOD  Final Report (10-10-24 @ 02:00):    No growth at 5 days    Culture - Blood (collected 10-04-24 @ 16:30)  Source: .Blood BLOOD  Final Report (10-10-24 @ 02:00):    No growth at 5 days        Culture - Sputum (collected 10-25-24 @ 17:30)  Source: Trach Asp Tracheal Aspirate  Gram Stain (10-27-24 @ 17:35):    Few polymorphonuclear leukocytes per low power field    Rare Squamous epithelial cells per low power field    Few Gram Negative Rods per oil power field  Final Report (10-27-24 @ 17:35):    Moderate Pseudomonas aeruginosa (Carbapenem Resistant)    Commensal dominick consistent with body site  Organism: Pseudomonas aeruginosa (Carbapenem Resistant) (10-27-24 @ 17:35)  Organism: Pseudomonas aeruginosa (Carbapenem Resistant) (10-27-24 @ 17:35)      Method Type: CarbaR      -  Resistance Gene to Carbapenem: Nondet  Organism: Pseudomonas aeruginosa (Carbapenem Resistant) (10-27-24 @ 17:35)      Method Type: VIDA      -  Aztreonam: R >16      -  Cefepime: I 16      -  Ceftazidime: R >16      -  Ceftazidime/Avibactam: S <=4      -  Ceftolozane/tazobactam: S 4      -  Ciprofloxacin: R >2      -  Imipenem: R 8      -  Levofloxacin: R >4      -  Meropenem: I 4      -  Piperacillin/Tazobactam: R >64      Culture - Bronchial (10.14.24 @ 11:20)    -  Aztreonam: I 16   -  Cefepime: I 16   -  Ceftazidime: R >16   -  Piperacillin/Tazobactam: R 64   -  Imipenem: S 2   -  Levofloxacin: S <=0.5   -  Ciprofloxacin: S <=0.25   -  Meropenem: S <=1   Gram Stain:   Moderate polymorphonuclear leukocytes per low power field  Rare Squamous epithelial cells per low power field  Few Gram Negative Rods per oil power field   Specimen Source: Bronchial   Culture Results:   Few Pseudomonas aeruginosa  Commensal dominick consistent with body site   Organism Identification: Pseudomonas aeruginosa   Organism: Pseudomonas aeruginosa   Method Type: VIDA      Culture - Bronchial (10.08.24 @ 20:40)    -  Minocycline: S   -  Piperacillin/Tazobactam: S <=8   -  Trimethoprim/Sulfamethoxazole: S <=0.5/9.5   -  Imipenem: S 2   -  Ciprofloxacin: S <=0.25   -  Levofloxacin: S <=0.5   -  Levofloxacin: S <=0.5   -  Meropenem: S <=1   -  Aztreonam: S <=4   -  Cefepime: S <=2   -  Ceftazidime: S <=1   Gram Stain:   Moderate polymorphonuclear leukocytes seen per low power field  Few Squamous epithelial cells seen per low power field  Few Gram Negative Rods seen per oil power field   Specimen Source: Bronchial   Culture Results:   Few Pseudomonas aeruginosa  Moderate Stenotrophomonas maltophilia   Organism Identification: Pseudomonas aeruginosa  Stenotrophomonas maltophilia   Organism: Pseudomonas aeruginosa   Organism: Stenotrophomonas maltophilia   Organism: Stenotrophomonas maltophilia   Method Type: KB   Method Type: VIDA   Method Type: VIDA            RADIOLOGY & ADDITIONAL TESTS: Reviewed.   Interval Events: No acute events. SpO2 stable on 30% FiO2 on full vent support, tolerating PS 10/5/35% today. Thick copious secretions suctioned at bedside.       REVIEW OF SYSTEMS:  All negative unless listed within interval HPI     OBJECTIVE:   Vital Signs Last 24 Hrs  T(C): 36.8 (31 Oct 2024 06:41), Max: 37.2 (31 Oct 2024 05:05)  T(F): 98.2 (31 Oct 2024 06:41), Max: 98.9 (31 Oct 2024 05:05)  HR: 80 (31 Oct 2024 07:00) (74 - 86)  BP: 157/82 (31 Oct 2024 06:41) (126/69 - 157/82)  BP(mean): 97 (31 Oct 2024 04:00) (85 - 97)  RR: 18 (31 Oct 2024 06:41) (14 - 20)  SpO2: 93% (31 Oct 2024 06:41) (93% - 100%)    O2 Parameters below as of 31 Oct 2024 06:41  Patient On (Oxygen Delivery Method): tracheostomy collar  O2 Flow (L/min): 30        Mode: AC/ CMV (Assist Control/ Continuous Mandatory Ventilation), RR (machine): 16, TV (machine): 400, FiO2: 30, PEEP: 5, ITime: 0.9, MAP: 8, PIP: 17    10-30 @ 07:01  -  10-31 @ 07:00  --------------------------------------------------------  IN: 800 mL / OUT: 250 mL / NET: 550 mL      CAPILLARY BLOOD GLUCOSE  POCT Blood Glucose.: 226 mg/dL (31 Oct 2024 05:10)      PHYSICAL EXAM:  General: thin cachectic, chronically ill appearing, NAD  HEENT: Sclera clear, PERRL  Neck: +trach   Respiratory: mechanical BS bilat, bilateral rhonchi  Cardiovascular: S1S2 RRR  Abdomen: soft + BS + PEG  Extremities: warm, no LE edema   Neurological: arousable to voice, follows commands    HOSPITAL MEDICATIONS:  heparin   Injectable 5000 Unit(s) SubCutaneous every 12 hours    ceftolozane/tazobactam IVPB 450 milliGRAM(s) IV Intermittent every 8 hours      dextrose 50% Injectable 12.5 Gram(s) IV Push once  dextrose 50% Injectable 25 Gram(s) IV Push once  dextrose 50% Injectable 25 Gram(s) IV Push once  dextrose Oral Gel 15 Gram(s) Oral once PRN  glucagon  Injectable 1 milliGRAM(s) IntraMuscular once  insulin glargine Injectable (LANTUS) 15 Unit(s) SubCutaneous at bedtime  insulin lispro (ADMELOG) corrective regimen sliding scale   SubCutaneous every 6 hours    albuterol/ipratropium for Nebulization 3 milliLiter(s) Nebulizer every 6 hours  sodium chloride 3%  Inhalation 4 milliLiter(s) Inhalation every 6 hours    acetaminophen   Oral Liquid .. 650 milliGRAM(s) Oral every 6 hours PRN    pantoprazole   Suspension 40 milliGRAM(s) Enteral Tube daily        dextrose 5%. 1000 milliLiter(s) IV Continuous <Continuous>  dextrose 5%. 1000 milliLiter(s) IV Continuous <Continuous>  ferrous    sulfate Liquid 300 milliGRAM(s) Enteral Tube daily  Nephro-noam 1 Tablet(s) Oral daily    epoetin reilly-epbx (RETACRIT) Injectable 23840 Unit(s) IV Push <User Schedule>    chlorhexidine 0.12% Liquid 15 milliLiter(s) Oral Mucosa every 12 hours  chlorhexidine 2% Cloths 1 Application(s) Topical <User Schedule>  nystatin Powder 1 Application(s) Topical every 8 hours    lactobacillus acidophilus 1 Tablet(s) Oral daily      LABS:                        8.7    9.94  )-----------( 232      ( 31 Oct 2024 03:10 )             27.8     Hgb Trend: 8.7<--, 8.7<--, 8.8<--, 9.1<--, 8.6<--  10-31    135  |  96  |  73[H]  ----------------------------<  228[H]  4.2   |  27  |  4.46[H]    Ca    9.6      31 Oct 2024 04:10  Phos  4.5     10-31  Mg     2.5     10-31    TPro  7.5  /  Alb  1.8[L]  /  TBili  0.3  /  DBili  x   /  AST  28  /  ALT  16  /  AlkPhos  207[H]  10-31    Creatinine Trend: 4.46<--, 3.26<--, 5.08<--, 4.21<--, 3.00<--, 4.04<--            MICROBIOLOGY:   Culture - Blood (collected 10-28-24 @ 15:00)  Source: .Blood BLOOD  Preliminary Report (10-30-24 @ 01:01):    No growth at 24 hours    Culture - Blood (collected 10-28-24 @ 15:00)  Source: .Blood BLOOD  Preliminary Report (10-30-24 @ 01:01):    No growth at 24 hours    Culture - Blood (collected 10-25-24 @ 16:55)  Source: .Blood BLOOD  Preliminary Report (10-29-24 @ 23:00):    No growth at 4 days    Culture - Blood (collected 10-25-24 @ 16:55)  Source: .Blood BLOOD  Preliminary Report (10-29-24 @ 23:00):    No growth at 4 days    Culture - Blood (collected 10-18-24 @ 11:10)  Source: .Blood BLOOD  Final Report (10-23-24 @ 15:01):    No growth at 5 days    Culture - Blood (collected 10-07-24 @ 18:35)  Source: .Blood BLOOD  Final Report (10-13-24 @ 01:01):    No growth at 5 days    Culture - Blood (collected 10-07-24 @ 18:30)  Source: .Blood BLOOD  Final Report (10-13-24 @ 01:01):    No growth at 5 days    Culture - Blood (collected 10-04-24 @ 16:40)  Source: .Blood BLOOD  Final Report (10-10-24 @ 02:00):    No growth at 5 days    Culture - Blood (collected 10-04-24 @ 16:30)  Source: .Blood BLOOD  Final Report (10-10-24 @ 02:00):    No growth at 5 days        Culture - Sputum (collected 10-25-24 @ 17:30)  Source: Trach Asp Tracheal Aspirate  Gram Stain (10-27-24 @ 17:35):    Few polymorphonuclear leukocytes per low power field    Rare Squamous epithelial cells per low power field    Few Gram Negative Rods per oil power field  Final Report (10-27-24 @ 17:35):    Moderate Pseudomonas aeruginosa (Carbapenem Resistant)    Commensal dominick consistent with body site  Organism: Pseudomonas aeruginosa (Carbapenem Resistant) (10-27-24 @ 17:35)  Organism: Pseudomonas aeruginosa (Carbapenem Resistant) (10-27-24 @ 17:35)      Method Type: CarbaR      -  Resistance Gene to Carbapenem: Nondet  Organism: Pseudomonas aeruginosa (Carbapenem Resistant) (10-27-24 @ 17:35)      Method Type: VIDA      -  Aztreonam: R >16      -  Cefepime: I 16      -  Ceftazidime: R >16      -  Ceftazidime/Avibactam: S <=4      -  Ceftolozane/tazobactam: S 4      -  Ciprofloxacin: R >2      -  Imipenem: R 8      -  Levofloxacin: R >4      -  Meropenem: I 4      -  Piperacillin/Tazobactam: R >64      Culture - Bronchial (10.14.24 @ 11:20)    -  Aztreonam: I 16   -  Cefepime: I 16   -  Ceftazidime: R >16   -  Piperacillin/Tazobactam: R 64   -  Imipenem: S 2   -  Levofloxacin: S <=0.5   -  Ciprofloxacin: S <=0.25   -  Meropenem: S <=1   Gram Stain:   Moderate polymorphonuclear leukocytes per low power field  Rare Squamous epithelial cells per low power field  Few Gram Negative Rods per oil power field   Specimen Source: Bronchial   Culture Results:   Few Pseudomonas aeruginosa  Commensal dominick consistent with body site   Organism Identification: Pseudomonas aeruginosa   Organism: Pseudomonas aeruginosa   Method Type: VIDA      Culture - Bronchial (10.08.24 @ 20:40)    -  Minocycline: S   -  Piperacillin/Tazobactam: S <=8   -  Trimethoprim/Sulfamethoxazole: S <=0.5/9.5   -  Imipenem: S 2   -  Ciprofloxacin: S <=0.25   -  Levofloxacin: S <=0.5   -  Levofloxacin: S <=0.5   -  Meropenem: S <=1   -  Aztreonam: S <=4   -  Cefepime: S <=2   -  Ceftazidime: S <=1   Gram Stain:   Moderate polymorphonuclear leukocytes seen per low power field  Few Squamous epithelial cells seen per low power field  Few Gram Negative Rods seen per oil power field   Specimen Source: Bronchial   Culture Results:   Few Pseudomonas aeruginosa  Moderate Stenotrophomonas maltophilia   Organism Identification: Pseudomonas aeruginosa  Stenotrophomonas maltophilia   Organism: Pseudomonas aeruginosa   Organism: Stenotrophomonas maltophilia   Organism: Stenotrophomonas maltophilia   Method Type: KB   Method Type: VIDA   Method Type: VIDA            RADIOLOGY & ADDITIONAL TESTS: Reviewed.  < from: Xray Chest 1 View- PORTABLE-Urgent (Xray Chest 1 View- PORTABLE-Urgent .) (10.23.24 @ 13:37) >  LEFT retrocardiac airspace consolidation/atelectasis and/or effusion   obscures diaphragm contour.  LEFT upper zones and visualized RIGHT lung parenchyma grosslyclear.  No pneumothorax..    HEART/VASCULAR: The heart size and mediastinum configuration are within   the limits of normal.    BONES: The visualized osseous thorax is intact.    IMPRESSION:  Tracheostomy tube in place. LEFT retrocardiac airspace consolidation   and/or atelectasis.

## 2024-10-31 NOTE — PROGRESS NOTE ADULT - ASSESSMENT
ESRD:  On HD TIW, last on Saturday.  - HD TIW.   - Placement at [facility].  - Limit UF due to Hypotension.  - Midodrine as needed.      Anemia:  - Transfuse for Hg < 7.  - Continue EPO.    Respiratory Failure:  Failed extubation.  - s/p Tracheostomy.    Otf Mena MD  Nephrology

## 2024-10-31 NOTE — PROGRESS NOTE ADULT - ASSESSMENT
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 The patient was admitted w/ Septic shock POA due to RLL PNA with course complicated by acute hypoxic respiratory failure ultimately requiring admission to the ICU for intubation on 10/8. The pt failed trial of extubation and was subsequently reintubated on 10/14 & trached on 10/23. Pt was downgraded to the medical floors on 10/24.      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    Resp failure status post tracheostomy  - c/w mechanical ventilation  - Pulmonary follow     - c/w Duoneb and hypertonic saline   - continue aggressive pulm toileting modalities with chest PT  - Trach to vent; appears to tolerate SBT at times  - being done daily     Anemia  - c/w Epo and ferrous sulfate   - status post 1 unit pRBC 10/26  - check FOBT     Severe protein-calorie malnutrition and Underweight (BMI < 19)  - c/w PEG feeds     DM   - c/w ISS    Constipation  - c/w Senna and Miralax stopped due to Diarrhea     ESRD  - c/w HD   - c/w Nephro-Kavon     Prophylaxis:  DVT: Heparin  GI: Protonix    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 The patient was admitted w/ Septic shock POA due to RLL PNA with course complicated by acute hypoxic respiratory failure ultimately requiring admission to the ICU for intubation on 10/8. The pt failed trial of extubation and was subsequently reintubated on 10/14 & trached on 10/23. Pt was downgraded to the medical floors on 10/24.      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 Zerbaxa day #4. SW for discharge planning     Resp failure status post tracheostomy  - c/w mechanical ventilation  - Pulmonary follow     - c/w Duoneb and hypertonic saline   - continue aggressive pulm toileting modalities with chest PT  - Trach to vent; appears to tolerate SBT at times  - being done daily     Anemia  - c/w Epo and ferrous sulfate   - status post 1 unit pRBC 10/26  - check FOBT     Severe protein-calorie malnutrition and Underweight (BMI < 19)  - c/w PEG feeds     DM   - c/w ISS    Constipation  - c/w Senna and Miralax stopped due to Diarrhea     ESRD  - c/w HD   - c/w Nephro-Kavon     Prophylaxis:  DVT: Heparin  GI: Protonix

## 2024-10-31 NOTE — PROGRESS NOTE ADULT - ASSESSMENT
74 yo M with h/o HTN, DM2, ESRD on HD (MWF, RUE AV Fistula), CVA with residual R sided weakness and dysphagia s/p peg, s/p trach since decannulated, RLE DVT (completed course of Eliquis) admitted with 1) RLL PNA with course complicated by acute hypoxic respiratory failure ultimately requiring intubation, failed trial of extubation and was subsequently reintubated 10/14 and tracheostomy on 10/14. 2) Septic shock 2 to PNA    Recs:  - Patient admitted w/ Septic shock POA due to RLL PNA with course complicated by acute hypoxic respiratory failure ultimately requiring admission to the ICU for intubation on 10/8  - Failed trial of extubation and was subsequently reintubated on 10/14  - Tracheostomy placed by surgery on 10/23; has a chronic PEG tube already   - He is s/p bronch x 2. BAL with PsAg and Stenotrophomonas & bronch culture with Pseudomonas aeruginosa on 10/8 & 10/14  - completed course of zosyn and levaquin. Currently on zerbaxa  for carbapenem resistant pseudomonas PNA, antibx per ID. f/u reccs  - continue aggressive pulm toileting modalities with chest PT, duonebs and hypersal   - Trach to vent; appears to tolerate SBT at times with higher settings like 12/5  will continue to attempt daily, but may need to wait for infection to improve to wean better  - maintain on duonebs javan hypersal with thick secretions from infection   - HD per nephrology   - rest of care per primary team     Plan discussed with pulm attending Dr. Overton.

## 2024-10-31 NOTE — PHARMACOTHERAPY INTERVENTION NOTE - NSPHARMCOMMASP
ASP - Dose optimization/Non-Renal dose adjustment
ASP - Lab/ test recommended
ASP - Renal dose adjustment
ASP - Renal dose adjustment
ASP - Duration of therapy

## 2024-10-31 NOTE — PROGRESS NOTE ADULT - SUBJECTIVE AND OBJECTIVE BOX
Patient is a 73y old  Male who presents with a chief complaint of Sepsis and acute hypoxic respiratory failure secondary to suspected aspiration PNA vs. HCAP (30 Oct 2024 14:20)    INTERVAL HPI/OVERNIGHT EVENTS: Patients seen and examined at bedside this morning. No acute events overnight.    MEDICATIONS  (STANDING):  albuterol/ipratropium for Nebulization 3 milliLiter(s) Nebulizer every 6 hours  ceftolozane/tazobactam IVPB 450 milliGRAM(s) IV Intermittent every 8 hours  chlorhexidine 0.12% Liquid 15 milliLiter(s) Oral Mucosa every 12 hours  chlorhexidine 2% Cloths 1 Application(s) Topical <User Schedule>  dextrose 5%. 1000 milliLiter(s) (100 mL/Hr) IV Continuous <Continuous>  dextrose 5%. 1000 milliLiter(s) (50 mL/Hr) IV Continuous <Continuous>  dextrose 50% Injectable 12.5 Gram(s) IV Push once  dextrose 50% Injectable 25 Gram(s) IV Push once  dextrose 50% Injectable 25 Gram(s) IV Push once  epoetin reilly-epbx (RETACRIT) Injectable 86991 Unit(s) IV Push <User Schedule>  ferrous    sulfate Liquid 300 milliGRAM(s) Enteral Tube daily  glucagon  Injectable 1 milliGRAM(s) IntraMuscular once  heparin   Injectable 5000 Unit(s) SubCutaneous every 12 hours  insulin glargine Injectable (LANTUS) 15 Unit(s) SubCutaneous at bedtime  insulin lispro (ADMELOG) corrective regimen sliding scale   SubCutaneous every 6 hours  lactobacillus acidophilus 1 Tablet(s) Oral daily  Nephro-noam 1 Tablet(s) Oral daily  nystatin Powder 1 Application(s) Topical every 8 hours  pantoprazole   Suspension 40 milliGRAM(s) Enteral Tube daily  sodium chloride 3%  Inhalation 4 milliLiter(s) Inhalation every 6 hours    MEDICATIONS  (PRN):  acetaminophen   Oral Liquid .. 650 milliGRAM(s) Oral every 6 hours PRN Temp greater or equal to 38.5C (101.3F)  dextrose Oral Gel 15 Gram(s) Oral once PRN Blood Glucose LESS THAN 70 milliGRAM(s)/deciliter    Allergies    No Known Allergies    Intolerances      REVIEW OF SYSTEMS:  All other systems reviewed and are negative    Vital Signs Last 24 Hrs  T(C): 36.8 (31 Oct 2024 06:41), Max: 37.2 (31 Oct 2024 05:05)  T(F): 98.2 (31 Oct 2024 06:41), Max: 98.9 (31 Oct 2024 05:05)  HR: 80 (31 Oct 2024 07:00) (74 - 86)  BP: 157/82 (31 Oct 2024 06:41) (126/69 - 157/82)  BP(mean): 97 (31 Oct 2024 04:00) (85 - 97)  RR: 18 (31 Oct 2024 06:41) (14 - 20)  SpO2: 93% (31 Oct 2024 06:41) (93% - 100%)    Parameters below as of 31 Oct 2024 06:41  Patient On (Oxygen Delivery Method): tracheostomy collar  O2 Flow (L/min): 30    Daily     Daily   I&O's Summary    30 Oct 2024 07:01  -  31 Oct 2024 07:00  --------------------------------------------------------  IN: 800 mL / OUT: 250 mL / NET: 550 mL      CAPILLARY BLOOD GLUCOSE      POCT Blood Glucose.: 226 mg/dL (31 Oct 2024 05:10)  POCT Blood Glucose.: 311 mg/dL (30 Oct 2024 22:43)  POCT Blood Glucose.: 128 mg/dL (30 Oct 2024 17:24)  POCT Blood Glucose.: 171 mg/dL (30 Oct 2024 11:44)    PHYSICAL EXAM:  HEENT: s/p tracheostomy  RESPIRATORY: Normal respiratory effort; no wheezing  CARDIOVASCULAR: Regular rate and rhythm, normal S1 and S2, no murmur/rub/gallop; No lower extremity edema  ABDOMEN: Nontender to palpation, normoactive bowel sounds, no rebound/guarding  MUSCLOSKELETAL: no clubbing or cyanosis of digits, dec ROM of b/l LE  PSYCH: A+O to person  NEURO: awake, able to follow commands      Labs                          8.7    9.94  )-----------( 232      ( 31 Oct 2024 03:10 )             27.8     10-31    135  |  96  |  73[H]  ----------------------------<  228[H]  4.2   |  27  |  4.46[H]    Ca    9.6      31 Oct 2024 04:10  Phos  4.5     10-31  Mg     2.5     10-31    TPro  7.5  /  Alb  1.8[L]  /  TBili  0.3  /  DBili  x   /  AST  28  /  ALT  16  /  AlkPhos  207[H]  10-31          Urinalysis Basic - ( 31 Oct 2024 04:10 )    Color: x / Appearance: x / SG: x / pH: x  Gluc: 228 mg/dL / Ketone: x  / Bili: x / Urobili: x   Blood: x / Protein: x / Nitrite: x   Leuk Esterase: x / RBC: x / WBC x   Sq Epi: x / Non Sq Epi: x / Bacteria: x        Culture - Blood (collected 28 Oct 2024 15:00)  Source: .Blood BLOOD  Preliminary Report (31 Oct 2024 01:01):    No growth at 48 Hours    Culture - Blood (collected 28 Oct 2024 15:00)  Source: .Blood BLOOD  Preliminary Report (31 Oct 2024 01:01):    No growth at 48 Hours                    Radiology and Imaging reviewed.

## 2024-11-01 LAB
ALBUMIN SERPL ELPH-MCNC: 1.7 G/DL — LOW (ref 3.3–5)
ALP SERPL-CCNC: 267 U/L — HIGH (ref 40–120)
ALT FLD-CCNC: 20 U/L — SIGNIFICANT CHANGE UP (ref 12–78)
ANION GAP SERPL CALC-SCNC: 11 MMOL/L — SIGNIFICANT CHANGE UP (ref 5–17)
AST SERPL-CCNC: 29 U/L — SIGNIFICANT CHANGE UP (ref 15–37)
BILIRUB SERPL-MCNC: 0.3 MG/DL — SIGNIFICANT CHANGE UP (ref 0.2–1.2)
BUN SERPL-MCNC: 58 MG/DL — HIGH (ref 7–23)
CALCIUM SERPL-MCNC: 9.2 MG/DL — SIGNIFICANT CHANGE UP (ref 8.5–10.1)
CHLORIDE SERPL-SCNC: 94 MMOL/L — LOW (ref 96–108)
CO2 SERPL-SCNC: 27 MMOL/L — SIGNIFICANT CHANGE UP (ref 22–31)
CREAT SERPL-MCNC: 3.8 MG/DL — HIGH (ref 0.5–1.3)
EGFR: 16 ML/MIN/1.73M2 — LOW
GLUCOSE BLDC GLUCOMTR-MCNC: 258 MG/DL — HIGH (ref 70–99)
GLUCOSE BLDC GLUCOMTR-MCNC: 264 MG/DL — HIGH (ref 70–99)
GLUCOSE BLDC GLUCOMTR-MCNC: 284 MG/DL — HIGH (ref 70–99)
GLUCOSE BLDC GLUCOMTR-MCNC: 380 MG/DL — HIGH (ref 70–99)
GLUCOSE SERPL-MCNC: 247 MG/DL — HIGH (ref 70–99)
HAV IGM SER-ACNC: SIGNIFICANT CHANGE UP
HBV CORE AB SER-ACNC: SIGNIFICANT CHANGE UP
HBV CORE IGM SER-ACNC: SIGNIFICANT CHANGE UP
HBV CORE IGM SER-ACNC: SIGNIFICANT CHANGE UP
HBV DNA # SERPL NAA+PROBE: SIGNIFICANT CHANGE UP
HBV DNA SERPL NAA+PROBE-LOG#: SIGNIFICANT CHANGE UP LOGIU/ML
HBV SURFACE AB SER-ACNC: <3 MIU/ML — LOW
HBV SURFACE AB SER-ACNC: ABNORMAL
HBV SURFACE AG SER-ACNC: SIGNIFICANT CHANGE UP
HCT VFR BLD CALC: 29.8 % — LOW (ref 39–50)
HCV AB S/CO SERPL IA: 0.14 S/CO — SIGNIFICANT CHANGE UP (ref 0–0.99)
HCV AB SERPL-IMP: SIGNIFICANT CHANGE UP
HGB BLD-MCNC: 9.4 G/DL — LOW (ref 13–17)
MAGNESIUM SERPL-MCNC: 2.2 MG/DL — SIGNIFICANT CHANGE UP (ref 1.6–2.6)
MCHC RBC-ENTMCNC: 24.2 PG — LOW (ref 27–34)
MCHC RBC-ENTMCNC: 31.5 G/DL — LOW (ref 32–36)
MCV RBC AUTO: 76.8 FL — LOW (ref 80–100)
NRBC # BLD: 0 /100 WBCS — SIGNIFICANT CHANGE UP (ref 0–0)
PLATELET # BLD AUTO: 245 K/UL — SIGNIFICANT CHANGE UP (ref 150–400)
POTASSIUM SERPL-MCNC: 3.4 MMOL/L — LOW (ref 3.5–5.3)
POTASSIUM SERPL-SCNC: 3.4 MMOL/L — LOW (ref 3.5–5.3)
PROT SERPL-MCNC: 7.3 GM/DL — SIGNIFICANT CHANGE UP (ref 6–8.3)
RBC # BLD: 3.88 M/UL — LOW (ref 4.2–5.8)
RBC # FLD: 23.2 % — HIGH (ref 10.3–14.5)
SODIUM SERPL-SCNC: 132 MMOL/L — LOW (ref 135–145)
WBC # BLD: 10.01 K/UL — SIGNIFICANT CHANGE UP (ref 3.8–10.5)
WBC # FLD AUTO: 10.01 K/UL — SIGNIFICANT CHANGE UP (ref 3.8–10.5)

## 2024-11-01 PROCEDURE — 99233 SBSQ HOSP IP/OBS HIGH 50: CPT | Mod: 25

## 2024-11-01 PROCEDURE — 71045 X-RAY EXAM CHEST 1 VIEW: CPT | Mod: 26

## 2024-11-01 PROCEDURE — 99232 SBSQ HOSP IP/OBS MODERATE 35: CPT

## 2024-11-01 PROCEDURE — 31720 CLEARANCE OF AIRWAYS: CPT

## 2024-11-01 RX ORDER — INSULIN GLARGINE,HUM.REC.ANLOG 100/ML
20 VIAL (ML) SUBCUTANEOUS AT BEDTIME
Refills: 0 | Status: DISCONTINUED | OUTPATIENT
Start: 2024-11-01 | End: 2024-11-02

## 2024-11-01 RX ADMIN — IPRATROPIUM BROMIDE AND ALBUTEROL SULFATE 3 MILLILITER(S): .5; 2.5 SOLUTION RESPIRATORY (INHALATION) at 05:34

## 2024-11-01 RX ADMIN — Medication 1 TABLET(S): at 11:38

## 2024-11-01 RX ADMIN — Medication 650 MILLIGRAM(S): at 17:25

## 2024-11-01 RX ADMIN — CHLORHEXIDINE GLUCONATE 15 MILLILITER(S): 40 SOLUTION TOPICAL at 05:44

## 2024-11-01 RX ADMIN — Medication 10: at 23:54

## 2024-11-01 RX ADMIN — IPRATROPIUM BROMIDE AND ALBUTEROL SULFATE 3 MILLILITER(S): .5; 2.5 SOLUTION RESPIRATORY (INHALATION) at 11:40

## 2024-11-01 RX ADMIN — CEFTOLOZANE AND TAZOBACTAM 33.33 MILLIGRAM(S): 1; .5 INJECTION, POWDER, LYOPHILIZED, FOR SOLUTION INTRAVENOUS at 17:23

## 2024-11-01 RX ADMIN — NYSTATIN 1 APPLICATION(S): 100000 POWDER TOPICAL at 21:30

## 2024-11-01 RX ADMIN — NYSTATIN 1 APPLICATION(S): 100000 POWDER TOPICAL at 05:47

## 2024-11-01 RX ADMIN — CEFTOLOZANE AND TAZOBACTAM 33.33 MILLIGRAM(S): 1; .5 INJECTION, POWDER, LYOPHILIZED, FOR SOLUTION INTRAVENOUS at 08:29

## 2024-11-01 RX ADMIN — Medication 650 MILLIGRAM(S): at 19:32

## 2024-11-01 RX ADMIN — SODIUM CHLORIDE 4 MILLILITER(S): 9 INJECTION, SOLUTION INTRAMUSCULAR; INTRAVENOUS; SUBCUTANEOUS at 05:34

## 2024-11-01 RX ADMIN — SODIUM CHLORIDE 4 MILLILITER(S): 9 INJECTION, SOLUTION INTRAMUSCULAR; INTRAVENOUS; SUBCUTANEOUS at 17:42

## 2024-11-01 RX ADMIN — CHLORHEXIDINE GLUCONATE 1 APPLICATION(S): 40 SOLUTION TOPICAL at 05:46

## 2024-11-01 RX ADMIN — Medication 6: at 11:37

## 2024-11-01 RX ADMIN — HEPARIN SODIUM 5000 UNIT(S): 10000 INJECTION INTRAVENOUS; SUBCUTANEOUS at 17:24

## 2024-11-01 RX ADMIN — CHLORHEXIDINE GLUCONATE 15 MILLILITER(S): 40 SOLUTION TOPICAL at 18:12

## 2024-11-01 RX ADMIN — Medication 20 UNIT(S): at 23:54

## 2024-11-01 RX ADMIN — PANTOPRAZOLE SODIUM 40 MILLIGRAM(S): 40 TABLET, DELAYED RELEASE ORAL at 11:38

## 2024-11-01 RX ADMIN — Medication 300 MILLIGRAM(S): at 11:38

## 2024-11-01 RX ADMIN — HYDRALAZINE HYDROCHLORIDE 50 MILLIGRAM(S): 50 TABLET, FILM COATED ORAL at 13:18

## 2024-11-01 RX ADMIN — HEPARIN SODIUM 5000 UNIT(S): 10000 INJECTION INTRAVENOUS; SUBCUTANEOUS at 05:45

## 2024-11-01 RX ADMIN — Medication 6: at 17:24

## 2024-11-01 RX ADMIN — SODIUM CHLORIDE 4 MILLILITER(S): 9 INJECTION, SOLUTION INTRAMUSCULAR; INTRAVENOUS; SUBCUTANEOUS at 11:40

## 2024-11-01 RX ADMIN — SODIUM CHLORIDE 4 MILLILITER(S): 9 INJECTION, SOLUTION INTRAMUSCULAR; INTRAVENOUS; SUBCUTANEOUS at 23:40

## 2024-11-01 RX ADMIN — Medication 6: at 05:45

## 2024-11-01 RX ADMIN — NYSTATIN 1 APPLICATION(S): 100000 POWDER TOPICAL at 17:24

## 2024-11-01 RX ADMIN — IPRATROPIUM BROMIDE AND ALBUTEROL SULFATE 3 MILLILITER(S): .5; 2.5 SOLUTION RESPIRATORY (INHALATION) at 23:40

## 2024-11-01 RX ADMIN — CEFTOLOZANE AND TAZOBACTAM 33.33 MILLIGRAM(S): 1; .5 INJECTION, POWDER, LYOPHILIZED, FOR SOLUTION INTRAVENOUS at 23:54

## 2024-11-01 RX ADMIN — IPRATROPIUM BROMIDE AND ALBUTEROL SULFATE 3 MILLILITER(S): .5; 2.5 SOLUTION RESPIRATORY (INHALATION) at 17:42

## 2024-11-01 RX ADMIN — HYDRALAZINE HYDROCHLORIDE 50 MILLIGRAM(S): 50 TABLET, FILM COATED ORAL at 21:30

## 2024-11-01 RX ADMIN — HYDRALAZINE HYDROCHLORIDE 50 MILLIGRAM(S): 50 TABLET, FILM COATED ORAL at 05:44

## 2024-11-01 NOTE — CHART NOTE - NSCHARTNOTEFT_GEN_A_CORE
called to bedside for pt with fever 100.6  vs 130/69 ,98,18,100,5 (rectal )   gen adult male s/p trach vent nods  head to say hello   lungs increase rll air entry at base  cvs s1s2 rr no mrg  abdomen -peg in place site clean  ext bp/pt pulses  2+ b/l , no bk edema     imp   pt on day 6 of zebrexa for carbapenem resistant  pseudomonas  blood cultures 10/28 ngtd ,previous blood cultures negative, sputum tracheal aspirate culture 10./25 positive for carbapenem resistant organism     plan -reculture sputum ,blood ,  check ua and culture if dirty   cxr  Tylenol prn

## 2024-11-01 NOTE — PROGRESS NOTE ADULT - SUBJECTIVE AND OBJECTIVE BOX
Patient is a 73y old  Male who presents with a chief complaint of Sepsis and acute hypoxic respiratory failure secondary to suspected aspiration PNA vs. HCAP (2024 11:14)    INTERVAL HPI/OVERNIGHT EVENTS: Patients seen and examined at bedside this morning. No acute events overnight.    MEDICATIONS  (STANDING):  albuterol/ipratropium for Nebulization 3 milliLiter(s) Nebulizer every 6 hours  ceftolozane/tazobactam IVPB 450 milliGRAM(s) IV Intermittent every 8 hours  chlorhexidine 0.12% Liquid 15 milliLiter(s) Oral Mucosa every 12 hours  chlorhexidine 2% Cloths 1 Application(s) Topical <User Schedule>  dextrose 5%. 1000 milliLiter(s) (50 mL/Hr) IV Continuous <Continuous>  dextrose 5%. 1000 milliLiter(s) (100 mL/Hr) IV Continuous <Continuous>  dextrose 50% Injectable 25 Gram(s) IV Push once  dextrose 50% Injectable 12.5 Gram(s) IV Push once  dextrose 50% Injectable 25 Gram(s) IV Push once  epoetin reilly-epbx (RETACRIT) Injectable 73689 Unit(s) IV Push <User Schedule>  ferrous    sulfate Liquid 300 milliGRAM(s) Enteral Tube daily  glucagon  Injectable 1 milliGRAM(s) IntraMuscular once  heparin   Injectable 5000 Unit(s) SubCutaneous every 12 hours  hydrALAZINE 50 milliGRAM(s) Oral every 8 hours  insulin glargine Injectable (LANTUS) 20 Unit(s) SubCutaneous at bedtime  insulin lispro (ADMELOG) corrective regimen sliding scale   SubCutaneous every 6 hours  lactobacillus acidophilus 1 Tablet(s) Oral daily  Nephro-noam 1 Tablet(s) Oral daily  nystatin Powder 1 Application(s) Topical every 8 hours  pantoprazole   Suspension 40 milliGRAM(s) Enteral Tube daily  sodium chloride 3%  Inhalation 4 milliLiter(s) Inhalation every 6 hours    MEDICATIONS  (PRN):  acetaminophen   Oral Liquid .. 650 milliGRAM(s) Oral every 6 hours PRN Temp greater or equal to 38.5C (101.3F)  dextrose Oral Gel 15 Gram(s) Oral once PRN Blood Glucose LESS THAN 70 milliGRAM(s)/deciliter    Allergies    No Known Allergies    Intolerances      REVIEW OF SYSTEMS:  All other systems reviewed and are negative    Vital Signs Last 24 Hrs  T(C): 36.7 (2024 10:40), Max: 36.8 (31 Oct 2024 16:00)  T(F): 98 (2024 10:40), Max: 98.2 (31 Oct 2024 16:00)  HR: 90 (2024 13:15) (78 - 100)  BP: 137/71 (2024 13:15) (103/68 - 158/70)  BP(mean): 91 (2024 13:15) (91 - 91)  RR: 17 (2024 10:40) (17 - 20)  SpO2: 99% (2024 12:36) (94% - 100%)    Parameters below as of 2024 11:40  Patient On (Oxygen Delivery Method): ventilator, 35%      Daily     Daily Weight in k.6 (2024 05:10)  I&O's Summary    31 Oct 2024 07:01  -  2024 07:00  --------------------------------------------------------  IN: 900 mL / OUT: 2000 mL / NET: -1100 mL      CAPILLARY BLOOD GLUCOSE      POCT Blood Glucose.: 264 mg/dL (2024 11:04)  POCT Blood Glucose.: 284 mg/dL (2024 05:40)  POCT Blood Glucose.: 261 mg/dL (31 Oct 2024 23:21)  POCT Blood Glucose.: 173 mg/dL (31 Oct 2024 21:05)  POCT Blood Glucose.: 158 mg/dL (31 Oct 2024 16:58)    PHYSICAL EXAM:  HEENT: s/p tracheostomy  RESPIRATORY: Normal respiratory effort; no wheezing  CARDIOVASCULAR: Regular rate and rhythm, normal S1 and S2, no murmur/rub/gallop; No lower extremity edema  ABDOMEN: Nontender to palpation, normoactive bowel sounds, no rebound/guarding  MUSCLOSKELETAL: no clubbing or cyanosis of digits, dec ROM of b/l LE  PSYCH: A+O to person  NEURO: awake, able to follow commands    Labs                          9.4    10.01 )-----------( 245      ( 2024 06:55 )             29.8     10-31    135  |  96  |  73[H]  ----------------------------<  228[H]  4.2   |  27  |  4.46[H]    Ca    9.6      31 Oct 2024 04:10  Phos  4.5     10-31  Mg     2.5     10-31    TPro  7.5  /  Alb  1.8[L]  /  TBili  0.3  /  DBili  x   /  AST  28  /  ALT  16  /  AlkPhos  207[H]  10-31          Urinalysis Basic - ( 31 Oct 2024 04:10 )    Color: x / Appearance: x / SG: x / pH: x  Gluc: 228 mg/dL / Ketone: x  / Bili: x / Urobili: x   Blood: x / Protein: x / Nitrite: x   Leuk Esterase: x / RBC: x / WBC x   Sq Epi: x / Non Sq Epi: x / Bacteria: x                      Radiology and Imaging reviewed.

## 2024-11-01 NOTE — PROGRESS NOTE ADULT - NS ATTEST RISK PROBLEM GEN_ALL_CORE FT
gram negative PNA, carbapenem resistant pseudomonas infection, hypoxic respiratory failure requiring mechanical ventilation, ESRD on HD, sepsis
fevers, sepsis, gram negative PNA, hypoxic respiratory failure requiring tracheostomy and mechanical vent support, ESRD requiring hemodialysis
gram negative sepsis, PNA, resistant bacterial infection with carbapenem resistance, hypoxic respiratory failure with mechanical vent dependence, ESRD requiring HD, fevers
gram negative sepsis, PNA, resistant bacterial infection with carbapenem resistance, hypoxic respiratory failure with mechanical vent dependence, ESRD requiring HD.
gram negative sepsis, PNA, resistant bacterial infection with carbapenem resistance, hypoxic respiratory failure with mechanical vent dependence, ESRD requiring HD.
gram negative sepsis, PNA, resistant bacterial infection with carbapenem resistance, hypoxic respiratory failure with mechanical vent dependence, ESRD requiring HD

## 2024-11-01 NOTE — PROGRESS NOTE ADULT - SUBJECTIVE AND OBJECTIVE BOX
CHIEF COMPLAINT: respiratory failure     Interval Events:    - pressure support 10/5  - afebrile       OBJECTIVE:  ICU Vital Signs Last 24 Hrs  T(C): 36.7 (01 Nov 2024 10:40), Max: 36.8 (31 Oct 2024 16:00)  T(F): 98 (01 Nov 2024 10:40), Max: 98.2 (31 Oct 2024 16:00)  HR: 89 (01 Nov 2024 10:40) (78 - 100)  BP: 143/73 (01 Nov 2024 10:40) (103/68 - 182/77)  BP(mean): 112 (31 Oct 2024 12:00) (112 - 112)  RR: 17 (01 Nov 2024 10:40) (17 - 20)  SpO2: 99% (01 Nov 2024 10:40) (94% - 100%)    O2 Parameters below as of 01 Nov 2024 05:10  Patient On (Oxygen Delivery Method): ventilator  O2 Flow (L/min): 12        Mode: PS (Pressure Support)/ Spontaneous, FiO2: 35, PEEP: 5, PS: 10, ITime: 1, MAP: 8, PIP: 16    10-31 @ 07:01  -  11-01 @ 07:00  --------------------------------------------------------  IN: 900 mL / OUT: 2000 mL / NET: -1100 mL      CAPILLARY BLOOD GLUCOSE  POCT Blood Glucose.: 264 mg/dL (01 Nov 2024 11:04)      PHYSICAL EXAM:  General: well, no distress   HEENT: Sclera clear, EOMI   Neck: supple no JVD  Respiratory: CTA B/L no wheezing Cough stridor  Cardiovascular: S1S2 RRR  Abdomen: soft + BS   Extremities: no edema, THAI   Skin: no rash / breakdown  Neurological: no focal deficits   Psychiatry: appropriate     HOSPITAL MEDICATIONS:  heparin   Injectable 5000 Unit(s) SubCutaneous every 12 hours  ceftolozane/tazobactam IVPB 450 milliGRAM(s) IV Intermittent every 8 hours  hydrALAZINE 50 milliGRAM(s) Oral every 8 hours  dextrose 50% Injectable 12.5 Gram(s) IV Push once  dextrose 50% Injectable 25 Gram(s) IV Push once  dextrose 50% Injectable 25 Gram(s) IV Push once  dextrose Oral Gel 15 Gram(s) Oral once PRN  glucagon  Injectable 1 milliGRAM(s) IntraMuscular once  insulin glargine Injectable (LANTUS) 20 Unit(s) SubCutaneous at bedtime  insulin lispro (ADMELOG) corrective regimen sliding scale   SubCutaneous every 6 hours  albuterol/ipratropium for Nebulization 3 milliLiter(s) Nebulizer every 6 hours  sodium chloride 3%  Inhalation 4 milliLiter(s) Inhalation every 6 hours  acetaminophen   Oral Liquid .. 650 milliGRAM(s) Oral every 6 hours PRN  pantoprazole   Suspension 40 milliGRAM(s) Enteral Tube daily  dextrose 5%. 1000 milliLiter(s) IV Continuous <Continuous>  dextrose 5%. 1000 milliLiter(s) IV Continuous <Continuous>  ferrous    sulfate Liquid 300 milliGRAM(s) Enteral Tube daily  Nephro-noam 1 Tablet(s) Oral daily  epoetin reilly-epbx (RETACRIT) Injectable 64912 Unit(s) IV Push <User Schedule>  chlorhexidine 0.12% Liquid 15 milliLiter(s) Oral Mucosa every 12 hours  chlorhexidine 2% Cloths 1 Application(s) Topical <User Schedule>  nystatin Powder 1 Application(s) Topical every 8 hours    lactobacillus acidophilus 1 Tablet(s) Oral daily      LABS:                        9.4    10.01 )-----------( 245      ( 01 Nov 2024 06:55 )             29.8     10-31    135  |  96  |  73[H]  ----------------------------<  228[H]  4.2   |  27  |  4.46[H]    Ca    9.6      31 Oct 2024 04:10  Phos  4.5     10-31  Mg     2.5     10-31    TPro  7.5  /  Alb  1.8[L]  /  TBili  0.3  /  DBili  x   /  AST  28  /  ALT  16  /  AlkPhos  207[H]  10-31              MICROBIOLOGY:   Culture Results:   No growth at 72 Hours (10-28-24 @ 15:00)  Culture Results:   No growth at 72 Hours (10-28-24 @ 15:00)  Culture Results:   Culture is being performed. (10-25-24 @ 18:35)  Culture Results:   Moderate Pseudomonas aeruginosa (Carbapenem Resistant)  Commensal dominick consistent with body site (10-25-24 @ 17:30)  Culture Results:   No growth at 5 days (10-25-24 @ 16:55)  Culture Results:   No growth at 5 days (10-25-24 @ 16:55)  Culture Results:   No growth at 5 days (10-18-24 @ 11:10)  Culture Results:   No acid-fast bacilli isolated at 2 weeks. (10-14-24 @ 11:20)  Culture Results:   Few Pseudomonas aeruginosa  Commensal dominick consistent with body site (10-14-24 @ 11:20)  Culture Results:   Few Pseudomonas aeruginosa  Moderate Stenotrophomonas maltophilia (10-08-24 @ 20:40)  Culture Results:   No acid-fast bacilli isolated at 3 weeks. (10-08-24 @ 20:40)  Culture Results:   No fungus isolated at 3 weeks. (10-08-24 @ 20:40)    LIVER FUNCTIONS - ( 31 Oct 2024 04:10 )  Alb: 1.8 g/dL / Pro: 7.5 gm/dL / ALK PHOS: 207 U/L / ALT: 16 U/L / AST: 28 U/L / GGT: x             Urinalysis Basic - ( 31 Oct 2024 04:10 )    Color: x / Appearance: x / SG: x / pH: x  Gluc: 228 mg/dL / Ketone: x  / Bili: x / Urobili: x   Blood: x / Protein: x / Nitrite: x   Leuk Esterase: x / RBC: x / WBC x   Sq Epi: x / Non Sq Epi: x / Bacteria: x      RADIOLOGY:    < from: US Abdomen Upper Quadrant Right (10.18.24 @ 09:24) >  IMPRESSION:  Etiology of elevated LFTs not elucidated.    Trace right pleural effusion.    < end of copied text >  Xray Chest 1 View- PORTABLE-Urgent (Xray Chest 1 View- PORTABLE-Urgent .) (10.22.24 @ 16:59) >  FINDINGS:  CATHETERS AND TUBES: ET tube tip above tracheal bifurcation.    PULMONARY:  LEFT retrocardiac airspace consolidation and/or effusion obscures   diaphragm contour. Remaining lung parenchyma clear.    HEART/VASCULAR: The heart size and mediastinum configuration are within   the limits of normal.    BONES: The visualized osseous thorax is intact.    IMPRESSION: ET tube tip above the bifurcation.  LEFT retrocardiac airspace consolidation and/or effusion.    < end of copied text >      EKG/ECHO:  < from: TTE Limited W or WO Ultrasound Enhancing Agent (05.04.24 @ 12:00) >     Left Ventricle:  Left ventricular systolic function is normal with a calculated ejection fraction of 63 % by the Tian's biplane method of disks.There are no regional wall motion abnormalities seen.  ____________________________________________________________________    < end of copied text >   CHIEF COMPLAINT: respiratory failure     Interval Events:    - pressure support 10/5  - afebrile       OBJECTIVE:  Vital Signs Last 24 Hrs  T(C): 36.7 (01 Nov 2024 10:40), Max: 36.8 (31 Oct 2024 16:00)  T(F): 98 (01 Nov 2024 10:40), Max: 98.2 (31 Oct 2024 16:00)  HR: 89 (01 Nov 2024 10:40) (78 - 100)  BP: 143/73 (01 Nov 2024 10:40) (103/68 - 182/77)  BP(mean): 112 (31 Oct 2024 12:00) (112 - 112)  RR: 17 (01 Nov 2024 10:40) (17 - 20)  SpO2: 99% (01 Nov 2024 10:40) (94% - 100%)    O2 Parameters below as of 01 Nov 2024 05:10  Patient On (Oxygen Delivery Method): ventilator  O2 Flow (L/min): 12        Mode: PS (Pressure Support)/ Spontaneous, FiO2: 35, PEEP: 5, PS: 10, ITime: 1, MAP: 8, PIP: 16    10-31 @ 07:01  -  11-01 @ 07:00  --------------------------------------------------------  IN: 900 mL / OUT: 2000 mL / NET: -1100 mL      CAPILLARY BLOOD GLUCOSE  POCT Blood Glucose.: 264 mg/dL (01 Nov 2024 11:04)      PHYSICAL EXAM:  General: no distress   HEENT: Sclera clear, EOMI   Neck: supple no JVD + trach- moderate yellow secretions  Respiratory: CTA B/L no wheezing Cough stridor  Cardiovascular: S1S2 RRR  Abdomen: soft + BS   Extremities: no edema   Skin: no rash       HOSPITAL MEDICATIONS:  heparin   Injectable 5000 Unit(s) SubCutaneous every 12 hours  ceftolozane/tazobactam IVPB 450 milliGRAM(s) IV Intermittent every 8 hours  hydrALAZINE 50 milliGRAM(s) Oral every 8 hours  dextrose 50% Injectable 12.5 Gram(s) IV Push once  dextrose 50% Injectable 25 Gram(s) IV Push once  dextrose 50% Injectable 25 Gram(s) IV Push once  dextrose Oral Gel 15 Gram(s) Oral once PRN  glucagon  Injectable 1 milliGRAM(s) IntraMuscular once  insulin glargine Injectable (LANTUS) 20 Unit(s) SubCutaneous at bedtime  insulin lispro (ADMELOG) corrective regimen sliding scale   SubCutaneous every 6 hours  albuterol/ipratropium for Nebulization 3 milliLiter(s) Nebulizer every 6 hours  sodium chloride 3%  Inhalation 4 milliLiter(s) Inhalation every 6 hours  acetaminophen   Oral Liquid .. 650 milliGRAM(s) Oral every 6 hours PRN  pantoprazole   Suspension 40 milliGRAM(s) Enteral Tube daily  dextrose 5%. 1000 milliLiter(s) IV Continuous <Continuous>  dextrose 5%. 1000 milliLiter(s) IV Continuous <Continuous>  ferrous    sulfate Liquid 300 milliGRAM(s) Enteral Tube daily  Nephro-noam 1 Tablet(s) Oral daily  epoetin reilly-epbx (RETACRIT) Injectable 61732 Unit(s) IV Push <User Schedule>  chlorhexidine 0.12% Liquid 15 milliLiter(s) Oral Mucosa every 12 hours  chlorhexidine 2% Cloths 1 Application(s) Topical <User Schedule>  nystatin Powder 1 Application(s) Topical every 8 hours    lactobacillus acidophilus 1 Tablet(s) Oral daily      LABS:                        9.4    10.01 )-----------( 245      ( 01 Nov 2024 06:55 )             29.8     10-31    135  |  96  |  73[H]  ----------------------------<  228[H]  4.2   |  27  |  4.46[H]    Ca    9.6      31 Oct 2024 04:10  Phos  4.5     10-31  Mg     2.5     10-31    TPro  7.5  /  Alb  1.8[L]  /  TBili  0.3  /  DBili  x   /  AST  28  /  ALT  16  /  AlkPhos  207[H]  10-31              MICROBIOLOGY:   Culture Results:   No growth at 72 Hours (10-28-24 @ 15:00)  Culture Results:   No growth at 72 Hours (10-28-24 @ 15:00)  Culture Results:   Culture is being performed. (10-25-24 @ 18:35)  Culture Results:   Moderate Pseudomonas aeruginosa (Carbapenem Resistant)  Commensal dominick consistent with body site (10-25-24 @ 17:30)  Culture Results:   No growth at 5 days (10-25-24 @ 16:55)  Culture Results:   No growth at 5 days (10-25-24 @ 16:55)  Culture Results:   No growth at 5 days (10-18-24 @ 11:10)  Culture Results:   No acid-fast bacilli isolated at 2 weeks. (10-14-24 @ 11:20)  Culture Results:   Few Pseudomonas aeruginosa  Commensal dominick consistent with body site (10-14-24 @ 11:20)  Culture Results:   Few Pseudomonas aeruginosa  Moderate Stenotrophomonas maltophilia (10-08-24 @ 20:40)  Culture Results:   No acid-fast bacilli isolated at 3 weeks. (10-08-24 @ 20:40)  Culture Results:   No fungus isolated at 3 weeks. (10-08-24 @ 20:40)    LIVER FUNCTIONS - ( 31 Oct 2024 04:10 )  Alb: 1.8 g/dL / Pro: 7.5 gm/dL / ALK PHOS: 207 U/L / ALT: 16 U/L / AST: 28 U/L / GGT: x                 RADIOLOGY:    < from: US Abdomen Upper Quadrant Right (10.18.24 @ 09:24) >  IMPRESSION:  Etiology of elevated LFTs not elucidated.    Trace right pleural effusion.    < end of copied text >  Xray Chest 1 View- PORTABLE-Urgent (Xray Chest 1 View- PORTABLE-Urgent .) (10.22.24 @ 16:59) >  FINDINGS:  CATHETERS AND TUBES: ET tube tip above tracheal bifurcation.    PULMONARY:  LEFT retrocardiac airspace consolidation and/or effusion obscures   diaphragm contour. Remaining lung parenchyma clear.    HEART/VASCULAR: The heart size and mediastinum configuration are within   the limits of normal.    BONES: The visualized osseous thorax is intact.    IMPRESSION: ET tube tip above the bifurcation.  LEFT retrocardiac airspace consolidation and/or effusion.    < end of copied text >      EKG/ECHO:  < from: TTE Limited W or WO Ultrasound Enhancing Agent (05.04.24 @ 12:00) >     Left Ventricle:  Left ventricular systolic function is normal with a calculated ejection fraction of 63 % by the Tian's biplane method of disks.There are no regional wall motion abnormalities seen.

## 2024-11-01 NOTE — PHYSICAL THERAPY INITIAL EVALUATION ADULT - ADDITIONAL COMMENTS
Pt's son Gavin present at bedside & able to provide information regarding pt's PLOF & living situation as pt is intubated & cannot speak. Pt lives c his son in a PH. Pt is totally dependent c ADLs, transfers (mechanical lift or passively lifting OOB c 2x person assist), & functional mobility and is non-ambulatory at baseline. Pt owns a mechanical lift.
Per previous PT Eval: Pt's son Gavin present at bedside & able to provide information regarding pt's PLOF & living situation as pt is intubated & cannot speak. Pt lives c his son in a PH. Pt is totally dependent c ADLs, transfers (mechanical lift or passively lifting OOB c 2x person assist), & functional mobility and is non-ambulatory at baseline. Pt owns a mechanical lift.

## 2024-11-01 NOTE — PROGRESS NOTE ADULT - ASSESSMENT
ESRD:  On HD TIW via an A-V fistula.  - HD TIW.   - Placement at [facility].  - Limit UF due to Hypotension.  - Midodrine as needed.      Anemia:  - Transfuse for Hg < 7.  - Continue EPO.    Respiratory Failure:  Failed extubation.  - s/p Tracheostomy.    Otf Mena MD  Nephrology

## 2024-11-01 NOTE — PHYSICAL THERAPY INITIAL EVALUATION ADULT - PERTINENT HX OF CURRENT PROBLEM, REHAB EVAL
Patient is a 73y old  Male who presents with a chief complaint of Sepsis and acute hypoxic respiratory failure secondary to suspected aspiration PNA vs. HCAP (01 Nov 2024 11:14)
Patient is a 74 y/o male admitted to [facility] due to Sepsis and acute hypoxic respiratory failure secondary to suspected aspiration PNA vs. HCAP. Pt presented to the ED on 10/4/24 for c/o cough & chills. Pt coughs at baseline d/t previous tracheostomy, however, the past 2-3 days pt has productive cough c yellow phlegm. Pt's wife assisted pt c dressing in the afternoon when he experienced nausea c c/o abdominal pain. In the ED, Tmax 101.3, HR as elevated as 118, BP as elevated as 201/68, and SpO2 as low as 91% on room air. Initially placed on 15L NRB with improvement of SpO2 to 95%. Now on room air. WBC 17.38K, hgb 9.9, sodium 129, BUN 61, Cr 4.05, alkphos 169. Lactic acid 3.6. CT head without evidence of acute hemorrhage mass or mass effect but with encephalomalacia and gliosis again seen involving the bifrontal region. CT CAP with pneumonia and rectum distended with stool.

## 2024-11-01 NOTE — PHYSICAL THERAPY INITIAL EVALUATION ADULT - MODALITIES TREATMENT COMMENTS
Sacrum / R buttock stage II pressure injury, scant serous drainage, no odor, 100% exposed dermis, sacrum 4 cm x 3 cm x .1 cm / R buttock 4 cm x 3 cm x .1 cm
Pt presents c DTI to sacrum.

## 2024-11-01 NOTE — PHARMACOTHERAPY INTERVENTION NOTE - INTERVENTION TYPE RECOOMEND
Dose Optimization/Non-renal Dose Adjustment
Dose Optimization/Non-renal Dose Adjustment
Therapy Recommended - Drug indicated but not ordered
Therapy Discontinuation Recommended - No indication

## 2024-11-01 NOTE — PROGRESS NOTE ADULT - ASSESSMENT
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 The patient was admitted w/ Septic shock POA due to RLL PNA with course complicated by acute hypoxic respiratory failure ultimately requiring admission to the ICU for intubation on 10/8. The pt failed trial of extubation and was subsequently reintubated on 10/14 & trached on 10/23. Pt was downgraded to the medical floors on 10/24.      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 Zerbaxa day #4. SW for discharge planning   (11/1) Abx for 7 days. Dispo pending.    Resp failure status post tracheostomy  - c/w mechanical ventilation  - Pulmonary follow     - c/w Duoneb and hypertonic saline   - continue aggressive pulm toileting modalities with chest PT  - Trach to vent; appears to tolerate SBT at times  - being done daily     Anemia  - c/w Epo and ferrous sulfate   - status post 1 unit pRBC 10/26  - check FOBT     Severe protein-calorie malnutrition and Underweight (BMI < 19)  - c/w PEG feeds     DM   - c/w ISS    Constipation  - c/w Senna and Miralax stopped due to Diarrhea     ESRD  - c/w HD   - c/w Nephro-Kavon     Prophylaxis:  DVT: Heparin  GI: Protonix

## 2024-11-01 NOTE — PHYSICAL THERAPY INITIAL EVALUATION ADULT - PRECAUTIONS/LIMITATIONS, REHAB EVAL
fall precautions
impaired skin integrity/aspiration precautions/cardiac precautions/fall precautions/isolation precautions/oxygen therapy device and L/min

## 2024-11-01 NOTE — PROGRESS NOTE ADULT - SUBJECTIVE AND OBJECTIVE BOX
JIMMY BLAND  73y  Patient is a 73y old  Male who presents with a chief complaint of Sepsis and acute hypoxic respiratory failure secondary to suspected aspiration PNA vs. HCAP (2024 15:05)    HPI:  Seen and examine. HD yesterday without incident.    HEALTH ISSUES - PROBLEM Dx:  Sepsis due to pneumonia    Acute respiratory failure with hypoxia    Hypertensive urgency    Constipation    Lactic acidosis    Insulin dependent type 2 diabetes mellitus    ESRD on hemodialysis    History of cerebrovascular accident (CVA) with residual deficit    History of DVT of lower extremity    Microcytic anemia    Hypoxia    Severe protein-calorie malnutrition    Encounter for palliative care          MEDICATIONS  (STANDING):  albuterol/ipratropium for Nebulization 3 milliLiter(s) Nebulizer every 6 hours  ceftolozane/tazobactam IVPB 450 milliGRAM(s) IV Intermittent every 8 hours  chlorhexidine 0.12% Liquid 15 milliLiter(s) Oral Mucosa every 12 hours  chlorhexidine 2% Cloths 1 Application(s) Topical <User Schedule>  dextrose 5%. 1000 milliLiter(s) (50 mL/Hr) IV Continuous <Continuous>  dextrose 5%. 1000 milliLiter(s) (100 mL/Hr) IV Continuous <Continuous>  dextrose 50% Injectable 12.5 Gram(s) IV Push once  dextrose 50% Injectable 25 Gram(s) IV Push once  dextrose 50% Injectable 25 Gram(s) IV Push once  epoetin reilly-epbx (RETACRIT) Injectable 01813 Unit(s) IV Push <User Schedule>  ferrous    sulfate Liquid 300 milliGRAM(s) Enteral Tube daily  glucagon  Injectable 1 milliGRAM(s) IntraMuscular once  heparin   Injectable 5000 Unit(s) SubCutaneous every 12 hours  hydrALAZINE 50 milliGRAM(s) Oral every 8 hours  insulin glargine Injectable (LANTUS) 20 Unit(s) SubCutaneous at bedtime  insulin lispro (ADMELOG) corrective regimen sliding scale   SubCutaneous every 6 hours  lactobacillus acidophilus 1 Tablet(s) Oral daily  Nephro-noam 1 Tablet(s) Oral daily  nystatin Powder 1 Application(s) Topical every 8 hours  pantoprazole   Suspension 40 milliGRAM(s) Enteral Tube daily  sodium chloride 3%  Inhalation 4 milliLiter(s) Inhalation every 6 hours    MEDICATIONS  (PRN):  acetaminophen   Oral Liquid .. 650 milliGRAM(s) Oral every 6 hours PRN Temp greater or equal to 38.5C (101.3F)  dextrose Oral Gel 15 Gram(s) Oral once PRN Blood Glucose LESS THAN 70 milliGRAM(s)/deciliter    Vital Signs Last 24 Hrs  T(C): 36.8 (2024 19:00), Max: 38.1 (2024 16:30)  T(F): 98.2 (2024 19:00), Max: 100.6 (2024 16:30)  HR: 87 (2024 17:47) (87 - 100)  BP: 138/69 (2024 16:00) (103/68 - 143/73)  BP(mean): 92 (2024 16:00) (91 - 92)  RR: 18 (2024 16:00) (17 - 20)  SpO2: 98% (2024 17:47) (96% - 100%)    Parameters below as of 2024 17:42  Patient On (Oxygen Delivery Method): ventilator, 35%      Daily     Daily Weight in k.6 (2024 05:10)    PHYSICAL EXAM:  Constitutional: He  appears comfortable and not distressed. Not diaphoretic.    Neck:  The thyroid is normal. Tracheostomy.    Respiratory: The lungs are clear to auscultation. No dullness and expansion is normal.    Cardiovascular: S1 and S2 are normal. No murmurs, rubs or gallops are present.    Gastrointestinal: The abdomen is soft. No tenderness is present. No masses are present. Bowel sounds are normal.    Genitourinary: The bladder is not distended. No CVA tenderness is present.    Extremities: No edema is noted. No deformities are present.    Neurological: Tone, power and sensation are normal.     Skin: No leasions are seen  or palpated.    Lymph Nodes: No lymphadenopathy is present.                              9.4    10.01 )-----------( 245      ( 2024 06:55 )             29.8     10    135  |  96  |  73[H]  ----------------------------<  228[H]  4.2   |  27  |  4.46[H]    Ca    9.6      31 Oct 2024 04:10  Phos  4.5     10-  Mg     2.5     10-31    TPro  7.5  /  Alb  1.8[L]  /  TBili  0.3  /  DBili  x   /  AST  28  /  ALT  16  /  AlkPhos  207[H]  10-31

## 2024-11-01 NOTE — PHYSICAL THERAPY INITIAL EVALUATION ADULT - CRITERIA FOR SKILLED THERAPEUTIC INTERVENTIONS
impairments found/functional limitations in following categories/predicted duration of therapy intervention/anticipated discharge recommendation
impairments found/functional limitations in following categories/risk reduction/prevention

## 2024-11-01 NOTE — PHYSICAL THERAPY INITIAL EVALUATION ADULT - GENERAL OBSERVATIONS, REHAB EVAL
Chart (EMR) reviewed. Pt was seen for wound care assessment and received laying semisupine in bed in NAD; +telemetry/continuous pulse oximetry, +intubated c mechanical ventilation (PEEP of 5, FIO2 of 35%), +Flex-seal, +peg tube (off), +B/L SCD's donned, +B/L wrist restraints donned, +R AVF for HD; son Gavin and his spouse present at bedside. Pt is alert; unable to assess orientation as pt is intubated. Pt is able to follow simple commands. Pt's son able to provide information regarding PLOF & living situation.
Pt found semi supine in bed in NAD, +IV, +trach to vent, +rectal tube, +belle cath, +PEG, +tele, JG Wheatley present.

## 2024-11-01 NOTE — PROGRESS NOTE ADULT - NS ATTEND AMEND GEN_ALL_CORE FT
pt seen and examined with NP    low grade fever 100.1 (oral) this afternoon  weaning on Pressure support 10/5  FIO2; 35%  thick yellow secretions suctioned from trach  saline lavage and suctioning performed for airway clearance  awake, following commands      73M PMH HTN, DM, ESRD on HD (RUE AVF), CVA 5/2024 with residual R weakness and dysphagia s/p PEG (5/17/24) and trach (5/14/24) subsequently decannulated 7/5/24, R common iliac DVT (5/2024) s/p course of apixiban, COVID PNA 7/2024 with hypoxia requiring high flow (s/p treatment with remdesivir and dexamethasone, weaned off O2 supplementation) presents with cough and sputum production. Febrile in ED with noted leukocytosis. CT chest showed RLL pneumonia. Course with worsening acute hypoxemic respiratory failure, RRTs for hypoxia. Upgraded to ICU 10/8 for worsening hypoxemia high flow -> BiPAP. Intubated 10/8 for hypoxia due to PNA and thick secretions. s/p bronchoscopy 10/8: BAL cx with pseudomonas and Stenotrophomonas. Weaned and extubated 10/10, failed extubation and reintubated for hypoxia 10/14 s/p repeat bronchoscopy with thick secretions retrieved cultures with pseudomonas. s/p trach 10/23. Downgraded to medical service 10/24. Course with fevers. Sputum cx 10/25 with carbapenem resistant pseudomonas.     DX: acute hypoxic respiratory failure requiring intubation/reintubation, failure to wean s/p trach, gram negative PNA (pseudomonas and stenotrophomonas), carbapenem resistant pseudomonas infection, ESRD on HD, HTN, sepsis present on admission    - on zerbaxa (day #5) for carbapenem resistant pseudomonas PNA, low grade fever today. antibx per ID  - s/p levaquin 10/16 - 10/22 for pseudomonas/stenotrophomonas in bronch cx  - s/p bactrim 10/12 - 10/16 for stenotrophomonas  - s/p zosyn 10/4 - 10/8  - if fever uptrends resend and check cultures  - cont daily weaning with pressure support as tolerate  - tolerating PS 10/5 today. thus far tolerating PS of 12/5 -> 10/5. cont to trial on SBT as tolerates, short term goal to get to PS 5/5 if tolerates  - rest on full vent support at night  - cont duonebs q6 hrs and hypersal nebs for impaired secretion mobilization  - HD per nephrology  - s/p trach POD #9 (#7 portex in place)  - local trach care  - tracheal suctioning performed at bedside today with thick yellow secretions retrieved from airways  - DVT ppx: on heparin sc  - remainder of care per primary team  - full code

## 2024-11-01 NOTE — PROGRESS NOTE ADULT - ASSESSMENT
72 yo M with h/o HTN, DM2, ESRD on HD (MWF, RUE AV Fistula), CVA with residual R sided weakness and dysphagia s/p peg, s/p trach since decannulated, RLE DVT (completed course of Eliquis) admitted with 1) RLL PNA with course complicated by acute hypoxic respiratory failure ultimately requiring intubation, failed trial of extubation and was subsequently reintubated 10/14 and tracheostomy on 10/14. 2) Septic shock 2 to PNA    Recs:  - Patient admitted w/ Septic shock POA due to RLL PNA with course complicated by acute hypoxic respiratory failure ultimately requiring admission to the ICU for intubation on 10/8  - Failed trial of extubation and was subsequently reintubated on 10/14  - Tracheostomy placed by surgery on 10/23; has a chronic PEG tube already   - He is s/p bronch x 2. BAL with PsAg and Stenotrophomonas & bronch culture with Pseudomonas aeruginosa on 10/8 & 10/14  - completed course of zosyn and levaquin.   - Currently on zerbaxa  for carbapenem resistant pseudomonas PNA, antibx per ID. f/u reccs  - continue aggressive pulm toileting modalities with chest PT, duonebs and hypersal   - Trach to vent; appears to tolerate SBT at times with higher settings like 12/5  will continue to attempt daily, but may need to wait for infection to improve to wean better  - maintain on duonebs and Hypersal with thick secretions from infection   - HD per nephrology   - rest of care per primary team     Plan discussed with pulm attending Dr. Overton.

## 2024-11-02 LAB
GLUCOSE BLDC GLUCOMTR-MCNC: 146 MG/DL — HIGH (ref 70–99)
GLUCOSE BLDC GLUCOMTR-MCNC: 174 MG/DL — HIGH (ref 70–99)
GLUCOSE BLDC GLUCOMTR-MCNC: 205 MG/DL — HIGH (ref 70–99)
GLUCOSE BLDC GLUCOMTR-MCNC: 257 MG/DL — HIGH (ref 70–99)
GLUCOSE BLDC GLUCOMTR-MCNC: 311 MG/DL — HIGH (ref 70–99)
GRAM STN FLD: ABNORMAL
HBV SURFACE AG SER-ACNC: SIGNIFICANT CHANGE UP
SPECIMEN SOURCE: SIGNIFICANT CHANGE UP

## 2024-11-02 PROCEDURE — 99233 SBSQ HOSP IP/OBS HIGH 50: CPT

## 2024-11-02 RX ORDER — INSULIN GLARGINE,HUM.REC.ANLOG 100/ML
25 VIAL (ML) SUBCUTANEOUS AT BEDTIME
Refills: 0 | Status: DISCONTINUED | OUTPATIENT
Start: 2024-11-02 | End: 2024-11-07

## 2024-11-02 RX ADMIN — Medication 4: at 12:35

## 2024-11-02 RX ADMIN — Medication 1 TABLET(S): at 12:01

## 2024-11-02 RX ADMIN — PANTOPRAZOLE SODIUM 40 MILLIGRAM(S): 40 TABLET, DELAYED RELEASE ORAL at 12:01

## 2024-11-02 RX ADMIN — IPRATROPIUM BROMIDE AND ALBUTEROL SULFATE 3 MILLILITER(S): .5; 2.5 SOLUTION RESPIRATORY (INHALATION) at 05:19

## 2024-11-02 RX ADMIN — SODIUM CHLORIDE 4 MILLILITER(S): 9 INJECTION, SOLUTION INTRAMUSCULAR; INTRAVENOUS; SUBCUTANEOUS at 05:18

## 2024-11-02 RX ADMIN — CHLORHEXIDINE GLUCONATE 15 MILLILITER(S): 40 SOLUTION TOPICAL at 05:05

## 2024-11-02 RX ADMIN — ERYTHROPOIETIN 10000 UNIT(S): 10000 INJECTION, SOLUTION INTRAVENOUS; SUBCUTANEOUS at 16:22

## 2024-11-02 RX ADMIN — Medication 8: at 05:03

## 2024-11-02 RX ADMIN — CHLORHEXIDINE GLUCONATE 15 MILLILITER(S): 40 SOLUTION TOPICAL at 21:09

## 2024-11-02 RX ADMIN — SODIUM CHLORIDE 4 MILLILITER(S): 9 INJECTION, SOLUTION INTRAMUSCULAR; INTRAVENOUS; SUBCUTANEOUS at 11:30

## 2024-11-02 RX ADMIN — IPRATROPIUM BROMIDE AND ALBUTEROL SULFATE 3 MILLILITER(S): .5; 2.5 SOLUTION RESPIRATORY (INHALATION) at 11:30

## 2024-11-02 RX ADMIN — CEFTOLOZANE AND TAZOBACTAM 33.33 MILLIGRAM(S): 1; .5 INJECTION, POWDER, LYOPHILIZED, FOR SOLUTION INTRAVENOUS at 19:01

## 2024-11-02 RX ADMIN — Medication 25 UNIT(S): at 21:42

## 2024-11-02 RX ADMIN — Medication 300 MILLIGRAM(S): at 12:01

## 2024-11-02 RX ADMIN — CHLORHEXIDINE GLUCONATE 1 APPLICATION(S): 40 SOLUTION TOPICAL at 05:06

## 2024-11-02 RX ADMIN — NYSTATIN 1 APPLICATION(S): 100000 POWDER TOPICAL at 21:34

## 2024-11-02 RX ADMIN — CEFTOLOZANE AND TAZOBACTAM 33.33 MILLIGRAM(S): 1; .5 INJECTION, POWDER, LYOPHILIZED, FOR SOLUTION INTRAVENOUS at 07:47

## 2024-11-02 RX ADMIN — NYSTATIN 1 APPLICATION(S): 100000 POWDER TOPICAL at 13:45

## 2024-11-02 RX ADMIN — HYDRALAZINE HYDROCHLORIDE 50 MILLIGRAM(S): 50 TABLET, FILM COATED ORAL at 21:42

## 2024-11-02 RX ADMIN — NYSTATIN 1 APPLICATION(S): 100000 POWDER TOPICAL at 05:06

## 2024-11-02 RX ADMIN — HYDRALAZINE HYDROCHLORIDE 50 MILLIGRAM(S): 50 TABLET, FILM COATED ORAL at 05:06

## 2024-11-02 RX ADMIN — HEPARIN SODIUM 5000 UNIT(S): 10000 INJECTION INTRAVENOUS; SUBCUTANEOUS at 19:01

## 2024-11-02 RX ADMIN — HEPARIN SODIUM 5000 UNIT(S): 10000 INJECTION INTRAVENOUS; SUBCUTANEOUS at 05:06

## 2024-11-02 NOTE — PROGRESS NOTE ADULT - ASSESSMENT
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 The patient was admitted w/ Septic shock POA due to RLL PNA with course complicated by acute hypoxic respiratory failure ultimately requiring admission to the ICU for intubation on 10/8. The pt failed trial of extubation and was subsequently reintubated on 10/14 & trached on 10/23. Pt was downgraded to the medical floors on 10/24.      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 Zerbaxa day #4. SW for discharge planning   (11/1) Abx for 7 days. Dispo pending.  (11/2) Zerbaxa day 6/7. Dispo as per .  F/U repeat blood cultures and sputum cultures. Clinically stable.    Resp failure status post tracheostomy  - c/w mechanical ventilation  - Pulmonary follow     - c/w Duoneb and hypertonic saline   - continue aggressive pulm toileting modalities with chest PT  - Trach to vent; appears to tolerate SBT at times  - being done daily     Anemia  - c/w Epo and ferrous sulfate   - status post 1 unit pRBC 10/26  - check FOBT     Severe protein-calorie malnutrition and Underweight (BMI < 19)  - c/w PEG feeds     DM   - c/w ISS  (11/2) Increase lantus to 25 units qhs.    Constipation  - c/w Senna and Miralax stopped due to Diarrhea     ESRD  - c/w HD   - c/w Nephro-Kavon     Prophylaxis:  DVT: Heparin  GI: Protonix

## 2024-11-02 NOTE — PROGRESS NOTE ADULT - SUBJECTIVE AND OBJECTIVE BOX
Patient is a 73y old  Male who presents with a chief complaint of Sepsis and acute hypoxic respiratory failure secondary to suspected aspiration PNA vs. HCAP (01 Nov 2024 19:24)    INTERVAL HPI/OVERNIGHT EVENTS: Patients seen and examined at bedside this morning. Afebrile.     MEDICATIONS  (STANDING):  albuterol/ipratropium for Nebulization 3 milliLiter(s) Nebulizer every 6 hours  ceftolozane/tazobactam IVPB 450 milliGRAM(s) IV Intermittent every 8 hours  chlorhexidine 0.12% Liquid 15 milliLiter(s) Oral Mucosa every 12 hours  chlorhexidine 2% Cloths 1 Application(s) Topical <User Schedule>  dextrose 5%. 1000 milliLiter(s) (50 mL/Hr) IV Continuous <Continuous>  dextrose 5%. 1000 milliLiter(s) (100 mL/Hr) IV Continuous <Continuous>  dextrose 50% Injectable 25 Gram(s) IV Push once  dextrose 50% Injectable 12.5 Gram(s) IV Push once  dextrose 50% Injectable 25 Gram(s) IV Push once  epoetin reilly-epbx (RETACRIT) Injectable 65163 Unit(s) IV Push <User Schedule>  ferrous    sulfate Liquid 300 milliGRAM(s) Enteral Tube daily  glucagon  Injectable 1 milliGRAM(s) IntraMuscular once  heparin   Injectable 5000 Unit(s) SubCutaneous every 12 hours  hydrALAZINE 50 milliGRAM(s) Oral every 8 hours  insulin glargine Injectable (LANTUS) 20 Unit(s) SubCutaneous at bedtime  insulin lispro (ADMELOG) corrective regimen sliding scale   SubCutaneous every 6 hours  lactobacillus acidophilus 1 Tablet(s) Oral daily  Nephro-noam 1 Tablet(s) Oral daily  nystatin Powder 1 Application(s) Topical every 8 hours  pantoprazole   Suspension 40 milliGRAM(s) Enteral Tube daily  sodium chloride 3%  Inhalation 4 milliLiter(s) Inhalation every 6 hours    MEDICATIONS  (PRN):  acetaminophen   Oral Liquid .. 650 milliGRAM(s) Oral every 6 hours PRN Temp greater or equal to 38.5C (101.3F)  dextrose Oral Gel 15 Gram(s) Oral once PRN Blood Glucose LESS THAN 70 milliGRAM(s)/deciliter    Allergies    No Known Allergies    Intolerances      REVIEW OF SYSTEMS:  All other systems reviewed and are negative    Vital Signs Last 24 Hrs  T(C): 36.9 (02 Nov 2024 05:05), Max: 38.1 (01 Nov 2024 16:30)  T(F): 98.4 (02 Nov 2024 05:05), Max: 100.6 (01 Nov 2024 16:30)  HR: 85 (02 Nov 2024 08:45) (78 - 98)  BP: 158/77 (02 Nov 2024 05:05) (136/68 - 158/77)  BP(mean): 92 (01 Nov 2024 16:00) (91 - 92)  RR: 18 (02 Nov 2024 05:05) (17 - 18)  SpO2: 95% (02 Nov 2024 08:45) (95% - 100%)    Parameters below as of 02 Nov 2024 05:19  Patient On (Oxygen Delivery Method): ventilator      Daily     Daily   I&O's Summary    01 Nov 2024 07:01  -  02 Nov 2024 07:00  --------------------------------------------------------  IN: 600 mL / OUT: 0 mL / NET: 600 mL      CAPILLARY BLOOD GLUCOSE      POCT Blood Glucose.: 311 mg/dL (02 Nov 2024 04:59)  POCT Blood Glucose.: 380 mg/dL (01 Nov 2024 23:50)  POCT Blood Glucose.: 258 mg/dL (01 Nov 2024 16:39)  POCT Blood Glucose.: 264 mg/dL (01 Nov 2024 11:04)    PHYSICAL EXAM:  HEENT: s/p tracheostomy  RESPIRATORY: Normal respiratory effort; no wheezing  CARDIOVASCULAR: Regular rate and rhythm, normal S1 and S2, no murmur/rub/gallop; No lower extremity edema  ABDOMEN: Nontender to palpation, normoactive bowel sounds, no rebound/guarding  MUSCLOSKELETAL: no clubbing or cyanosis of digits, dec ROM of b/l LE  PSYCH: A+O to person  NEURO: awake, able to follow commands      Labs                          9.4    10.01 )-----------( 245      ( 01 Nov 2024 06:55 )             29.8     11-01    132[L]  |  94[L]  |  58[H]  ----------------------------<  247[H]  3.4[L]   |  27  |  3.80[H]    Ca    9.2      01 Nov 2024 19:05  Mg     2.2     11-01    TPro  7.3  /  Alb  1.7[L]  /  TBili  0.3  /  DBili  x   /  AST  29  /  ALT  20  /  AlkPhos  267[H]  11-01          Urinalysis Basic - ( 01 Nov 2024 19:05 )    Color: x / Appearance: x / SG: x / pH: x  Gluc: 247 mg/dL / Ketone: x  / Bili: x / Urobili: x   Blood: x / Protein: x / Nitrite: x   Leuk Esterase: x / RBC: x / WBC x   Sq Epi: x / Non Sq Epi: x / Bacteria: x                      Radiology and Imaging reviewed.

## 2024-11-02 NOTE — PROGRESS NOTE ADULT - SUBJECTIVE AND OBJECTIVE BOX
JIMMY BLAND  73y  Patient is a 73y old  Male who presents with a chief complaint of Sepsis and acute hypoxic respiratory failure secondary to suspected aspiration PNA vs. HCAP (02 Nov 2024 10:00)    HPI:  Seen and examined. No new complaints.    HEALTH ISSUES - PROBLEM Dx:  Sepsis due to pneumonia    Acute respiratory failure with hypoxia    Hypertensive urgency    Constipation    Lactic acidosis    Insulin dependent type 2 diabetes mellitus    ESRD on hemodialysis    History of cerebrovascular accident (CVA) with residual deficit    History of DVT of lower extremity    Microcytic anemia    Hypoxia    Severe protein-calorie malnutrition    Encounter for palliative care          MEDICATIONS  (STANDING):  albuterol/ipratropium for Nebulization 3 milliLiter(s) Nebulizer every 6 hours  ceftolozane/tazobactam IVPB 450 milliGRAM(s) IV Intermittent every 8 hours  chlorhexidine 0.12% Liquid 15 milliLiter(s) Oral Mucosa every 12 hours  chlorhexidine 2% Cloths 1 Application(s) Topical <User Schedule>  dextrose 5%. 1000 milliLiter(s) (100 mL/Hr) IV Continuous <Continuous>  dextrose 5%. 1000 milliLiter(s) (50 mL/Hr) IV Continuous <Continuous>  dextrose 50% Injectable 12.5 Gram(s) IV Push once  dextrose 50% Injectable 25 Gram(s) IV Push once  dextrose 50% Injectable 25 Gram(s) IV Push once  epoetin reilly-epbx (RETACRIT) Injectable 53639 Unit(s) IV Push <User Schedule>  ferrous    sulfate Liquid 300 milliGRAM(s) Enteral Tube daily  glucagon  Injectable 1 milliGRAM(s) IntraMuscular once  heparin   Injectable 5000 Unit(s) SubCutaneous every 12 hours  hydrALAZINE 50 milliGRAM(s) Oral every 8 hours  insulin glargine Injectable (LANTUS) 25 Unit(s) SubCutaneous at bedtime  insulin lispro (ADMELOG) corrective regimen sliding scale   SubCutaneous every 6 hours  lactobacillus acidophilus 1 Tablet(s) Oral daily  Nephro-noam 1 Tablet(s) Oral daily  nystatin Powder 1 Application(s) Topical every 8 hours  pantoprazole   Suspension 40 milliGRAM(s) Enteral Tube daily  sodium chloride 3%  Inhalation 4 milliLiter(s) Inhalation every 6 hours    MEDICATIONS  (PRN):  acetaminophen   Oral Liquid .. 650 milliGRAM(s) Oral every 6 hours PRN Temp greater or equal to 38.5C (101.3F)  dextrose Oral Gel 15 Gram(s) Oral once PRN Blood Glucose LESS THAN 70 milliGRAM(s)/deciliter    Vital Signs Last 24 Hrs  T(C): 37 (02 Nov 2024 16:52), Max: 37.2 (02 Nov 2024 11:52)  T(F): 98.6 (02 Nov 2024 16:52), Max: 98.9 (02 Nov 2024 11:52)  HR: 95 (02 Nov 2024 16:55) (78 - 98)  BP: 104/60 (02 Nov 2024 16:52) (104/60 - 158/77)  BP(mean): --  RR: 17 (02 Nov 2024 16:52) (16 - 18)  SpO2: 98% (02 Nov 2024 16:55) (94% - 99%)    Parameters below as of 02 Nov 2024 16:00  Patient On (Oxygen Delivery Method): ventilator      Daily     Daily     PHYSICAL EXAM:  Constitutional:  He appears comfortable and not distressed. Not diaphoretic.    Neck:  The thyroid is normal. Tracheostomy.    Respiratory: The lungs are clear to auscultation. No dullness and expansion is normal.    Cardiovascular: S1 and S2 are normal. No murmurs, rubs or gallops are present.    Gastrointestinal: The abdomen is soft. No tenderness is present.     Genitourinary: The bladder is not distended. No CVA tenderness is present.    Extremities: No edema is noted. No deformities are present.    Neurological:  Tone, power and sensation are normal.     Skin: No lesions are seen  or palpated.    Lymph Nodes: No lymphadenopathy is present.                                9.4    10.01 )-----------( 245      ( 01 Nov 2024 06:55 )             29.8     11-01    132[L]  |  94[L]  |  58[H]  ----------------------------<  247[H]  3.4[L]   |  27  |  3.80[H]    Ca    9.2      01 Nov 2024 19:05  Mg     2.2     11-01    TPro  7.3  /  Alb  1.7[L]  /  TBili  0.3  /  DBili  x   /  AST  29  /  ALT  20  /  AlkPhos  267[H]  11-01

## 2024-11-02 NOTE — PROGRESS NOTE ADULT - ASSESSMENT
ESRD:  On HD TIW via an A-V fistula.  - HD TIW.   - Placement at [facility].  - Limit UF due to Hypotension.  - Midodrine as needed.      Anemia:  - Transfuse for Hg < 7.  - Continue EPO.    Respiratory Failure:  Failed extubation.  - s/p Tracheostomy.    Otf Mena MD  Nephrology

## 2024-11-03 LAB
ANION GAP SERPL CALC-SCNC: 13 MMOL/L — SIGNIFICANT CHANGE UP (ref 5–17)
BUN SERPL-MCNC: 48 MG/DL — HIGH (ref 7–23)
CALCIUM SERPL-MCNC: 9.3 MG/DL — SIGNIFICANT CHANGE UP (ref 8.5–10.1)
CHLORIDE SERPL-SCNC: 97 MMOL/L — SIGNIFICANT CHANGE UP (ref 96–108)
CO2 SERPL-SCNC: 23 MMOL/L — SIGNIFICANT CHANGE UP (ref 22–31)
CREAT SERPL-MCNC: 3.21 MG/DL — HIGH (ref 0.5–1.3)
CULTURE RESULTS: SIGNIFICANT CHANGE UP
CULTURE RESULTS: SIGNIFICANT CHANGE UP
EGFR: 20 ML/MIN/1.73M2 — LOW
GLUCOSE BLDC GLUCOMTR-MCNC: 197 MG/DL — HIGH (ref 70–99)
GLUCOSE BLDC GLUCOMTR-MCNC: 198 MG/DL — HIGH (ref 70–99)
GLUCOSE BLDC GLUCOMTR-MCNC: 228 MG/DL — HIGH (ref 70–99)
GLUCOSE BLDC GLUCOMTR-MCNC: 274 MG/DL — HIGH (ref 70–99)
GLUCOSE BLDC GLUCOMTR-MCNC: 287 MG/DL — HIGH (ref 70–99)
GLUCOSE SERPL-MCNC: 210 MG/DL — HIGH (ref 70–99)
POTASSIUM SERPL-MCNC: 4.2 MMOL/L — SIGNIFICANT CHANGE UP (ref 3.5–5.3)
POTASSIUM SERPL-SCNC: 4.2 MMOL/L — SIGNIFICANT CHANGE UP (ref 3.5–5.3)
SODIUM SERPL-SCNC: 133 MMOL/L — LOW (ref 135–145)
SPECIMEN SOURCE: SIGNIFICANT CHANGE UP
SPECIMEN SOURCE: SIGNIFICANT CHANGE UP

## 2024-11-03 PROCEDURE — 99233 SBSQ HOSP IP/OBS HIGH 50: CPT

## 2024-11-03 RX ORDER — ACETAMINOPHEN 500 MG
600 TABLET ORAL ONCE
Refills: 0 | Status: COMPLETED | OUTPATIENT
Start: 2024-11-03 | End: 2024-11-03

## 2024-11-03 RX ADMIN — NYSTATIN 1 APPLICATION(S): 100000 POWDER TOPICAL at 06:23

## 2024-11-03 RX ADMIN — Medication 2: at 17:06

## 2024-11-03 RX ADMIN — Medication 1 TABLET(S): at 11:40

## 2024-11-03 RX ADMIN — NYSTATIN 1 APPLICATION(S): 100000 POWDER TOPICAL at 13:11

## 2024-11-03 RX ADMIN — PANTOPRAZOLE SODIUM 40 MILLIGRAM(S): 40 TABLET, DELAYED RELEASE ORAL at 11:39

## 2024-11-03 RX ADMIN — SODIUM CHLORIDE 4 MILLILITER(S): 9 INJECTION, SOLUTION INTRAMUSCULAR; INTRAVENOUS; SUBCUTANEOUS at 06:22

## 2024-11-03 RX ADMIN — IPRATROPIUM BROMIDE AND ALBUTEROL SULFATE 3 MILLILITER(S): .5; 2.5 SOLUTION RESPIRATORY (INHALATION) at 00:49

## 2024-11-03 RX ADMIN — CEFTOLOZANE AND TAZOBACTAM 33.33 MILLIGRAM(S): 1; .5 INJECTION, POWDER, LYOPHILIZED, FOR SOLUTION INTRAVENOUS at 08:04

## 2024-11-03 RX ADMIN — CHLORHEXIDINE GLUCONATE 15 MILLILITER(S): 40 SOLUTION TOPICAL at 06:23

## 2024-11-03 RX ADMIN — Medication 240 MILLIGRAM(S): at 06:24

## 2024-11-03 RX ADMIN — SODIUM CHLORIDE 4 MILLILITER(S): 9 INJECTION, SOLUTION INTRAMUSCULAR; INTRAVENOUS; SUBCUTANEOUS at 17:07

## 2024-11-03 RX ADMIN — Medication 25 UNIT(S): at 22:24

## 2024-11-03 RX ADMIN — Medication 600 MILLIGRAM(S): at 07:30

## 2024-11-03 RX ADMIN — SODIUM CHLORIDE 4 MILLILITER(S): 9 INJECTION, SOLUTION INTRAMUSCULAR; INTRAVENOUS; SUBCUTANEOUS at 11:08

## 2024-11-03 RX ADMIN — IPRATROPIUM BROMIDE AND ALBUTEROL SULFATE 3 MILLILITER(S): .5; 2.5 SOLUTION RESPIRATORY (INHALATION) at 17:07

## 2024-11-03 RX ADMIN — SODIUM CHLORIDE 4 MILLILITER(S): 9 INJECTION, SOLUTION INTRAMUSCULAR; INTRAVENOUS; SUBCUTANEOUS at 00:48

## 2024-11-03 RX ADMIN — CHLORHEXIDINE GLUCONATE 15 MILLILITER(S): 40 SOLUTION TOPICAL at 17:25

## 2024-11-03 RX ADMIN — CEFTOLOZANE AND TAZOBACTAM 33.33 MILLIGRAM(S): 1; .5 INJECTION, POWDER, LYOPHILIZED, FOR SOLUTION INTRAVENOUS at 16:26

## 2024-11-03 RX ADMIN — HYDRALAZINE HYDROCHLORIDE 50 MILLIGRAM(S): 50 TABLET, FILM COATED ORAL at 22:24

## 2024-11-03 RX ADMIN — NYSTATIN 1 APPLICATION(S): 100000 POWDER TOPICAL at 22:24

## 2024-11-03 RX ADMIN — CEFTOLOZANE AND TAZOBACTAM 33.33 MILLIGRAM(S): 1; .5 INJECTION, POWDER, LYOPHILIZED, FOR SOLUTION INTRAVENOUS at 01:11

## 2024-11-03 RX ADMIN — HYDRALAZINE HYDROCHLORIDE 50 MILLIGRAM(S): 50 TABLET, FILM COATED ORAL at 06:23

## 2024-11-03 RX ADMIN — IPRATROPIUM BROMIDE AND ALBUTEROL SULFATE 3 MILLILITER(S): .5; 2.5 SOLUTION RESPIRATORY (INHALATION) at 06:14

## 2024-11-03 RX ADMIN — HEPARIN SODIUM 5000 UNIT(S): 10000 INJECTION INTRAVENOUS; SUBCUTANEOUS at 17:04

## 2024-11-03 RX ADMIN — Medication 6: at 00:45

## 2024-11-03 RX ADMIN — Medication 300 MILLIGRAM(S): at 11:40

## 2024-11-03 RX ADMIN — HYDRALAZINE HYDROCHLORIDE 50 MILLIGRAM(S): 50 TABLET, FILM COATED ORAL at 13:11

## 2024-11-03 RX ADMIN — Medication 4: at 11:41

## 2024-11-03 RX ADMIN — CHLORHEXIDINE GLUCONATE 1 APPLICATION(S): 40 SOLUTION TOPICAL at 06:22

## 2024-11-03 RX ADMIN — Medication 2: at 06:22

## 2024-11-03 RX ADMIN — IPRATROPIUM BROMIDE AND ALBUTEROL SULFATE 3 MILLILITER(S): .5; 2.5 SOLUTION RESPIRATORY (INHALATION) at 11:08

## 2024-11-03 RX ADMIN — HEPARIN SODIUM 5000 UNIT(S): 10000 INJECTION INTRAVENOUS; SUBCUTANEOUS at 06:22

## 2024-11-03 NOTE — PROGRESS NOTE ADULT - SUBJECTIVE AND OBJECTIVE BOX
ROGELIOJAYSON JIMMY  73y  Patient is a 73y old  Male who presents with a chief complaint of Sepsis and acute hypoxic respiratory failure secondary to suspected aspiration PNA vs. HCAP (03 Nov 2024 13:22)    HPI:  No new complaints.    HEALTH ISSUES - PROBLEM Dx:  Sepsis due to pneumonia    Acute respiratory failure with hypoxia    Hypertensive urgency    Constipation    Lactic acidosis    Insulin dependent type 2 diabetes mellitus    ESRD on hemodialysis    History of cerebrovascular accident (CVA) with residual deficit    History of DVT of lower extremity    Microcytic anemia    Hypoxia    Severe protein-calorie malnutrition    Encounter for palliative care          MEDICATIONS  (STANDING):  albuterol/ipratropium for Nebulization 3 milliLiter(s) Nebulizer every 6 hours  ceftolozane/tazobactam IVPB 450 milliGRAM(s) IV Intermittent every 8 hours  chlorhexidine 0.12% Liquid 15 milliLiter(s) Oral Mucosa every 12 hours  chlorhexidine 2% Cloths 1 Application(s) Topical <User Schedule>  dextrose 5%. 1000 milliLiter(s) (100 mL/Hr) IV Continuous <Continuous>  dextrose 5%. 1000 milliLiter(s) (50 mL/Hr) IV Continuous <Continuous>  dextrose 50% Injectable 25 Gram(s) IV Push once  dextrose 50% Injectable 12.5 Gram(s) IV Push once  dextrose 50% Injectable 25 Gram(s) IV Push once  epoetin reilly-epbx (RETACRIT) Injectable 04636 Unit(s) IV Push <User Schedule>  ferrous    sulfate Liquid 300 milliGRAM(s) Enteral Tube daily  glucagon  Injectable 1 milliGRAM(s) IntraMuscular once  heparin   Injectable 5000 Unit(s) SubCutaneous every 12 hours  hydrALAZINE 50 milliGRAM(s) Oral every 8 hours  insulin glargine Injectable (LANTUS) 25 Unit(s) SubCutaneous at bedtime  insulin lispro (ADMELOG) corrective regimen sliding scale   SubCutaneous every 6 hours  lactobacillus acidophilus 1 Tablet(s) Oral daily  Nephro-noam 1 Tablet(s) Oral daily  nystatin Powder 1 Application(s) Topical every 8 hours  pantoprazole   Suspension 40 milliGRAM(s) Enteral Tube daily  sodium chloride 3%  Inhalation 4 milliLiter(s) Inhalation every 6 hours    MEDICATIONS  (PRN):  acetaminophen   Oral Liquid .. 650 milliGRAM(s) Oral every 6 hours PRN Temp greater or equal to 38.5C (101.3F)  dextrose Oral Gel 15 Gram(s) Oral once PRN Blood Glucose LESS THAN 70 milliGRAM(s)/deciliter    Vital Signs Last 24 Hrs  T(C): 36.7 (03 Nov 2024 19:33), Max: 39.4 (03 Nov 2024 05:40)  T(F): 98.1 (03 Nov 2024 19:33), Max: 103 (03 Nov 2024 05:40)  HR: 92 (03 Nov 2024 17:25) (86 - 110)  BP: 133/67 (03 Nov 2024 16:35) (129/63 - 153/71)  BP(mean): --  RR: 19 (03 Nov 2024 16:35) (17 - 19)  SpO2: 100% (03 Nov 2024 17:25) (93% - 100%)    Parameters below as of 03 Nov 2024 17:25  Patient On (Oxygen Delivery Method): ventilator      Daily     Daily     PHYSICAL EXAM:  Constitutional:  He appears comfortable and not distressed. Not diaphoretic.    Neck:  The thyroid is normal. Tracheostomy.    Breasts: Normal examination.    Respiratory: The lungs are clear to auscultation. No dullness and expansion is normal.    Cardiovascular: S1 and S2 are normal. No murmurs, rubs or gallops are present.    Gastrointestinal: The abdomen is soft. No tenderness is present. No masses are present. Bowel sounds are normal.    Genitourinary: The bladder is not distended. No CVA tenderness is present.    Extremities: No edema is noted. No deformities are present.    Neurological: Tone, power and sensation are normal.     Skin: No lesions are seen  or palpated.    Lymph Nodes: No lymphadenopathy is present.          11-03    133[L]  |  97  |  48[H]  ----------------------------<  210[H]  4.2   |  23  |  3.21[H]    Ca    9.3      03 Nov 2024 06:40      Urinalysis Basic - ( 03 Nov 2024 06:40 )    Color: x / Appearance: x / SG: x / pH: x  Gluc: 210 mg/dL / Ketone: x  / Bili: x / Urobili: x   Blood: x / Protein: x / Nitrite: x   Leuk Esterase: x / RBC: x / WBC x   Sq Epi: x / Non Sq Epi: x / Bacteria: x

## 2024-11-03 NOTE — PROGRESS NOTE ADULT - ASSESSMENT
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 The patient was admitted w/ Septic shock POA due to RLL PNA with course complicated by acute hypoxic respiratory failure ultimately requiring admission to the ICU for intubation on 10/8. The pt failed trial of extubation and was subsequently reintubated on 10/14 & trached on 10/23. Pt was downgraded to the medical floors on 10/24.      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 Zerbaxa day #4. SW for discharge planning   (11/1) Abx for 7 days. Dispo pending.  (11/2) Zerbaxa day 6/7. Dispo as per .  F/U repeat blood cultures and sputum cultures. Clinically stable.  (11/3) sputum cultures with psudomonas. C/W Zerbaxa.    Resp failure status post tracheostomy  - c/w mechanical ventilation  - Pulmonary follow     - c/w Duoneb and hypertonic saline   - continue aggressive pulm toileting modalities with chest PT  - Trach to vent; appears to tolerate SBT at times  - being done daily     Anemia  - c/w Epo and ferrous sulfate   - status post 1 unit pRBC 10/26  - check FOBT     Severe protein-calorie malnutrition and Underweight (BMI < 19)  - c/w PEG feeds     DM   - c/w ISS  (11/2) Increase lantus to 25 units qhs.    Constipation  - c/w Senna and Miralax stopped due to Diarrhea     ESRD  - c/w HD   - c/w Nephro-Kavon     Prophylaxis:  DVT: Heparin  GI: Protonix    No

## 2024-11-03 NOTE — PROGRESS NOTE ADULT - ASSESSMENT
ESRD:  On HD TIW via an A-V fistula.  - HD TIW.   - Placement at [facility].  - Limit UF due to Hypotension.  - Midodrine as needed.      Anemia:  - Transfuse for Hg < 7.  - Continue EPO.    Respiratory Failure:  Failed extubation.  - s/p Tracheostomy.    Otf Mena MD  Nephrology

## 2024-11-03 NOTE — PROGRESS NOTE ADULT - SUBJECTIVE AND OBJECTIVE BOX
Patient is a 73y old  Male who presents with a chief complaint of Sepsis and acute hypoxic respiratory failure secondary to suspected aspiration PNA vs. HCAP (02 Nov 2024 18:37)    INTERVAL HPI/OVERNIGHT EVENTS: Patients seen and examined at bedside this morning. 103 fevers resolved with tylenol.     MEDICATIONS  (STANDING):  albuterol/ipratropium for Nebulization 3 milliLiter(s) Nebulizer every 6 hours  ceftolozane/tazobactam IVPB 450 milliGRAM(s) IV Intermittent every 8 hours  chlorhexidine 0.12% Liquid 15 milliLiter(s) Oral Mucosa every 12 hours  chlorhexidine 2% Cloths 1 Application(s) Topical <User Schedule>  dextrose 5%. 1000 milliLiter(s) (100 mL/Hr) IV Continuous <Continuous>  dextrose 5%. 1000 milliLiter(s) (50 mL/Hr) IV Continuous <Continuous>  dextrose 50% Injectable 12.5 Gram(s) IV Push once  dextrose 50% Injectable 25 Gram(s) IV Push once  dextrose 50% Injectable 25 Gram(s) IV Push once  epoetin reilly-epbx (RETACRIT) Injectable 54729 Unit(s) IV Push <User Schedule>  ferrous    sulfate Liquid 300 milliGRAM(s) Enteral Tube daily  glucagon  Injectable 1 milliGRAM(s) IntraMuscular once  heparin   Injectable 5000 Unit(s) SubCutaneous every 12 hours  hydrALAZINE 50 milliGRAM(s) Oral every 8 hours  insulin glargine Injectable (LANTUS) 25 Unit(s) SubCutaneous at bedtime  insulin lispro (ADMELOG) corrective regimen sliding scale   SubCutaneous every 6 hours  lactobacillus acidophilus 1 Tablet(s) Oral daily  Nephro-noam 1 Tablet(s) Oral daily  nystatin Powder 1 Application(s) Topical every 8 hours  pantoprazole   Suspension 40 milliGRAM(s) Enteral Tube daily  sodium chloride 3%  Inhalation 4 milliLiter(s) Inhalation every 6 hours    MEDICATIONS  (PRN):  acetaminophen   Oral Liquid .. 650 milliGRAM(s) Oral every 6 hours PRN Temp greater or equal to 38.5C (101.3F)  dextrose Oral Gel 15 Gram(s) Oral once PRN Blood Glucose LESS THAN 70 milliGRAM(s)/deciliter    Allergies    No Known Allergies    Intolerances      REVIEW OF SYSTEMS:  All other systems reviewed and are negative    Vital Signs Last 24 Hrs  T(C): 37 (03 Nov 2024 10:26), Max: 39.4 (03 Nov 2024 05:40)  T(F): 98.6 (03 Nov 2024 10:26), Max: 103 (03 Nov 2024 05:40)  HR: 86 (03 Nov 2024 12:05) (86 - 110)  BP: 132/68 (03 Nov 2024 10:26) (104/60 - 153/71)  BP(mean): --  RR: 18 (03 Nov 2024 10:26) (16 - 18)  SpO2: 100% (03 Nov 2024 12:05) (93% - 100%)    Parameters below as of 03 Nov 2024 10:26  Patient On (Oxygen Delivery Method): ventilator      Daily     Daily   I&O's Summary    02 Nov 2024 08:01  -  03 Nov 2024 07:00  --------------------------------------------------------  IN: 800 mL / OUT: 1000 mL / NET: -200 mL      CAPILLARY BLOOD GLUCOSE      POCT Blood Glucose.: 228 mg/dL (03 Nov 2024 11:22)  POCT Blood Glucose.: 197 mg/dL (03 Nov 2024 05:34)  POCT Blood Glucose.: 287 mg/dL (02 Nov 2024 23:59)  POCT Blood Glucose.: 257 mg/dL (02 Nov 2024 21:39)  POCT Blood Glucose.: 146 mg/dL (02 Nov 2024 16:50)    PHYSICAL EXAM:  HEENT: s/p tracheostomy  RESPIRATORY: Normal respiratory effort; no wheezing  CARDIOVASCULAR: Regular rate and rhythm, normal S1 and S2, no murmur/rub/gallop; No lower extremity edema  ABDOMEN: Nontender to palpation, normoactive bowel sounds, no rebound/guarding  MUSCLOSKELETAL: no clubbing or cyanosis of digits, dec ROM of b/l LE  PSYCH: A+O to person  NEURO: awake, able to follow commands      Labs      11-03    133[L]  |  97  |  48[H]  ----------------------------<  210[H]  4.2   |  23  |  3.21[H]    Ca    9.3      03 Nov 2024 06:40  Mg     2.2     11-01    TPro  7.3  /  Alb  1.7[L]  /  TBili  0.3  /  DBili  x   /  AST  29  /  ALT  20  /  AlkPhos  267[H]  11-01          Urinalysis Basic - ( 03 Nov 2024 06:40 )    Color: x / Appearance: x / SG: x / pH: x  Gluc: 210 mg/dL / Ketone: x  / Bili: x / Urobili: x   Blood: x / Protein: x / Nitrite: x   Leuk Esterase: x / RBC: x / WBC x   Sq Epi: x / Non Sq Epi: x / Bacteria: x        Culture - Sputum (collected 02 Nov 2024 06:50)  Source: Trach Asp Tracheal Aspirate  Gram Stain (prelim) (03 Nov 2024 09:47):    Rare polymorphonuclear leukocytes per low power field    Few Squamous epithelial cells per low power field    Few Gram Negative Rods per oil power field    Rare Gram positive cocci in pairs per oil power field    Rare Yeast like cells per oil power field  Preliminary Report (03 Nov 2024 09:47):    Moderate Pseudomonas aeruginosa    Commensal dominick consistent with body site    Culture - Blood (collected 01 Nov 2024 19:05)  Source: .Blood BLOOD  Preliminary Report (03 Nov 2024 01:19):    No growth at 24 hours                    Radiology and Imaging reviewed.

## 2024-11-04 LAB
-  AZTREONAM: SIGNIFICANT CHANGE UP
-  CEFEPIME: SIGNIFICANT CHANGE UP
-  CEFTAZIDIME: SIGNIFICANT CHANGE UP
-  CIPROFLOXACIN: SIGNIFICANT CHANGE UP
-  IMIPENEM: SIGNIFICANT CHANGE UP
-  LEVOFLOXACIN: SIGNIFICANT CHANGE UP
-  MEROPENEM: SIGNIFICANT CHANGE UP
-  PIPERACILLIN/TAZOBACTAM: SIGNIFICANT CHANGE UP
ALBUMIN SERPL ELPH-MCNC: 1.6 G/DL — LOW (ref 3.3–5)
ALP SERPL-CCNC: 195 U/L — HIGH (ref 40–120)
ALT FLD-CCNC: 17 U/L — SIGNIFICANT CHANGE UP (ref 12–78)
ANION GAP SERPL CALC-SCNC: 13 MMOL/L — SIGNIFICANT CHANGE UP (ref 5–17)
AST SERPL-CCNC: 23 U/L — SIGNIFICANT CHANGE UP (ref 15–37)
BILIRUB SERPL-MCNC: 0.4 MG/DL — SIGNIFICANT CHANGE UP (ref 0.2–1.2)
BUN SERPL-MCNC: 67 MG/DL — HIGH (ref 7–23)
CALCIUM SERPL-MCNC: 9.6 MG/DL — SIGNIFICANT CHANGE UP (ref 8.5–10.1)
CHLORIDE SERPL-SCNC: 95 MMOL/L — LOW (ref 96–108)
CO2 SERPL-SCNC: 23 MMOL/L — SIGNIFICANT CHANGE UP (ref 22–31)
CREAT SERPL-MCNC: 4.26 MG/DL — HIGH (ref 0.5–1.3)
EGFR: 14 ML/MIN/1.73M2 — LOW
GLUCOSE BLDC GLUCOMTR-MCNC: 153 MG/DL — HIGH (ref 70–99)
GLUCOSE BLDC GLUCOMTR-MCNC: 174 MG/DL — HIGH (ref 70–99)
GLUCOSE BLDC GLUCOMTR-MCNC: 184 MG/DL — HIGH (ref 70–99)
GLUCOSE BLDC GLUCOMTR-MCNC: 185 MG/DL — HIGH (ref 70–99)
GLUCOSE BLDC GLUCOMTR-MCNC: 217 MG/DL — HIGH (ref 70–99)
GLUCOSE BLDC GLUCOMTR-MCNC: 270 MG/DL — HIGH (ref 70–99)
GLUCOSE SERPL-MCNC: 204 MG/DL — HIGH (ref 70–99)
HCT VFR BLD CALC: 26.7 % — LOW (ref 39–50)
HGB BLD-MCNC: 8.5 G/DL — LOW (ref 13–17)
MAGNESIUM SERPL-MCNC: 2.4 MG/DL — SIGNIFICANT CHANGE UP (ref 1.6–2.6)
MCHC RBC-ENTMCNC: 24.4 PG — LOW (ref 27–34)
MCHC RBC-ENTMCNC: 31.8 G/DL — LOW (ref 32–36)
MCV RBC AUTO: 76.7 FL — LOW (ref 80–100)
METHOD TYPE: SIGNIFICANT CHANGE UP
NRBC # BLD: 0 /100 WBCS — SIGNIFICANT CHANGE UP (ref 0–0)
PHOSPHATE SERPL-MCNC: 3.4 MG/DL — SIGNIFICANT CHANGE UP (ref 2.5–4.5)
PLATELET # BLD AUTO: 417 K/UL — HIGH (ref 150–400)
POTASSIUM SERPL-MCNC: 4.2 MMOL/L — SIGNIFICANT CHANGE UP (ref 3.5–5.3)
POTASSIUM SERPL-SCNC: 4.2 MMOL/L — SIGNIFICANT CHANGE UP (ref 3.5–5.3)
PROT SERPL-MCNC: 7.2 GM/DL — SIGNIFICANT CHANGE UP (ref 6–8.3)
RBC # BLD: 3.48 M/UL — LOW (ref 4.2–5.8)
RBC # FLD: 22.2 % — HIGH (ref 10.3–14.5)
SODIUM SERPL-SCNC: 131 MMOL/L — LOW (ref 135–145)
WBC # BLD: 15.89 K/UL — HIGH (ref 3.8–10.5)
WBC # FLD AUTO: 15.89 K/UL — HIGH (ref 3.8–10.5)

## 2024-11-04 PROCEDURE — 99233 SBSQ HOSP IP/OBS HIGH 50: CPT

## 2024-11-04 RX ORDER — ACETAMINOPHEN 500 MG
600 TABLET ORAL ONCE
Refills: 0 | Status: COMPLETED | OUTPATIENT
Start: 2024-11-04 | End: 2024-11-04

## 2024-11-04 RX ORDER — ACETAMINOPHEN 500 MG
600 TABLET ORAL ONCE
Refills: 0 | Status: DISCONTINUED | OUTPATIENT
Start: 2024-11-04 | End: 2024-11-04

## 2024-11-04 RX ADMIN — Medication 25 UNIT(S): at 22:46

## 2024-11-04 RX ADMIN — Medication 2: at 17:35

## 2024-11-04 RX ADMIN — HYDRALAZINE HYDROCHLORIDE 50 MILLIGRAM(S): 50 TABLET, FILM COATED ORAL at 22:45

## 2024-11-04 RX ADMIN — CHLORHEXIDINE GLUCONATE 15 MILLILITER(S): 40 SOLUTION TOPICAL at 07:16

## 2024-11-04 RX ADMIN — SODIUM CHLORIDE 4 MILLILITER(S): 9 INJECTION, SOLUTION INTRAMUSCULAR; INTRAVENOUS; SUBCUTANEOUS at 11:07

## 2024-11-04 RX ADMIN — CHLORHEXIDINE GLUCONATE 15 MILLILITER(S): 40 SOLUTION TOPICAL at 17:34

## 2024-11-04 RX ADMIN — PANTOPRAZOLE SODIUM 40 MILLIGRAM(S): 40 TABLET, DELAYED RELEASE ORAL at 12:32

## 2024-11-04 RX ADMIN — NYSTATIN 1 APPLICATION(S): 100000 POWDER TOPICAL at 13:50

## 2024-11-04 RX ADMIN — Medication 300 MILLIGRAM(S): at 12:32

## 2024-11-04 RX ADMIN — CEFTOLOZANE AND TAZOBACTAM 33.33 MILLIGRAM(S): 1; .5 INJECTION, POWDER, LYOPHILIZED, FOR SOLUTION INTRAVENOUS at 01:01

## 2024-11-04 RX ADMIN — Medication 240 MILLIGRAM(S): at 13:57

## 2024-11-04 RX ADMIN — IPRATROPIUM BROMIDE AND ALBUTEROL SULFATE 3 MILLILITER(S): .5; 2.5 SOLUTION RESPIRATORY (INHALATION) at 05:41

## 2024-11-04 RX ADMIN — HYDRALAZINE HYDROCHLORIDE 50 MILLIGRAM(S): 50 TABLET, FILM COATED ORAL at 05:58

## 2024-11-04 RX ADMIN — NYSTATIN 1 APPLICATION(S): 100000 POWDER TOPICAL at 22:45

## 2024-11-04 RX ADMIN — Medication 1 TABLET(S): at 12:31

## 2024-11-04 RX ADMIN — Medication 6: at 00:59

## 2024-11-04 RX ADMIN — CHLORHEXIDINE GLUCONATE 1 APPLICATION(S): 40 SOLUTION TOPICAL at 05:59

## 2024-11-04 RX ADMIN — SODIUM CHLORIDE 4 MILLILITER(S): 9 INJECTION, SOLUTION INTRAMUSCULAR; INTRAVENOUS; SUBCUTANEOUS at 17:11

## 2024-11-04 RX ADMIN — SODIUM CHLORIDE 4 MILLILITER(S): 9 INJECTION, SOLUTION INTRAMUSCULAR; INTRAVENOUS; SUBCUTANEOUS at 00:53

## 2024-11-04 RX ADMIN — IPRATROPIUM BROMIDE AND ALBUTEROL SULFATE 3 MILLILITER(S): .5; 2.5 SOLUTION RESPIRATORY (INHALATION) at 00:53

## 2024-11-04 RX ADMIN — NYSTATIN 1 APPLICATION(S): 100000 POWDER TOPICAL at 05:58

## 2024-11-04 RX ADMIN — HYDRALAZINE HYDROCHLORIDE 50 MILLIGRAM(S): 50 TABLET, FILM COATED ORAL at 13:50

## 2024-11-04 RX ADMIN — Medication 600 MILLIGRAM(S): at 16:00

## 2024-11-04 RX ADMIN — IPRATROPIUM BROMIDE AND ALBUTEROL SULFATE 3 MILLILITER(S): .5; 2.5 SOLUTION RESPIRATORY (INHALATION) at 17:11

## 2024-11-04 RX ADMIN — IPRATROPIUM BROMIDE AND ALBUTEROL SULFATE 3 MILLILITER(S): .5; 2.5 SOLUTION RESPIRATORY (INHALATION) at 11:07

## 2024-11-04 RX ADMIN — Medication 1 TABLET(S): at 12:32

## 2024-11-04 RX ADMIN — HEPARIN SODIUM 5000 UNIT(S): 10000 INJECTION INTRAVENOUS; SUBCUTANEOUS at 17:33

## 2024-11-04 RX ADMIN — CEFTOLOZANE AND TAZOBACTAM 33.33 MILLIGRAM(S): 1; .5 INJECTION, POWDER, LYOPHILIZED, FOR SOLUTION INTRAVENOUS at 08:37

## 2024-11-04 RX ADMIN — HEPARIN SODIUM 5000 UNIT(S): 10000 INJECTION INTRAVENOUS; SUBCUTANEOUS at 05:58

## 2024-11-04 RX ADMIN — SODIUM CHLORIDE 4 MILLILITER(S): 9 INJECTION, SOLUTION INTRAMUSCULAR; INTRAVENOUS; SUBCUTANEOUS at 05:41

## 2024-11-04 RX ADMIN — Medication 4: at 05:57

## 2024-11-04 RX ADMIN — Medication 2: at 12:00

## 2024-11-04 NOTE — PROGRESS NOTE ADULT - ASSESSMENT
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 The patient was admitted w/ Septic shock POA due to RLL PNA with course complicated by acute hypoxic respiratory failure ultimately requiring admission to the ICU for intubation on 10/8. The pt failed trial of extubation and was subsequently reintubated on 10/14 & trached on 10/23. Pt was downgraded to the medical floors on 10/24.      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 Zerbaxa day #4. SW for discharge planning   (11/1) Abx for 7 days. Dispo pending.  (11/2) Zerbaxa day 6/7. Dispo as per .  F/U repeat blood cultures and sputum cultures. Clinically stable.  (11/3) sputum cultures with psudomonas. C/W Zerbaxa.  (11/4) Completed Abx. Dispo pending to [facility]    Resp failure status post tracheostomy  - c/w mechanical ventilation  - Pulmonary follow     - c/w Duoneb and hypertonic saline   - continue aggressive pulm toileting modalities with chest PT  - Trach to vent; appears to tolerate SBT at times  - being done daily     Anemia  - c/w Epo and ferrous sulfate   - status post 1 unit pRBC 10/26  - check FOBT     Severe protein-calorie malnutrition and Underweight (BMI < 19)  - c/w PEG feeds     DM   - c/w ISS  (11/2) Increase lantus to 25 units qhs.    Constipation  - c/w Senna and Miralax stopped due to Diarrhea     ESRD  - c/w HD   - c/w Nephro-Kavon     Prophylaxis:  DVT: Heparin  GI: Protonix

## 2024-11-04 NOTE — PROGRESS NOTE ADULT - SUBJECTIVE AND OBJECTIVE BOX
CHIEF COMPLAINT:Patient is a 73y old  Male who presents with a chief complaint of Sepsis and acute hypoxic respiratory failure secondary to suspected aspiration PNA vs. HCAP (03 Nov 2024 21:37)      Interval Events: Patient resting comfortably in bed.  Currently on full support via the ventilator.  Tracheostomy site is clean and dry and intact sutures remain in place.    REVIEW OF SYSTEMS:  [x] All other systems negative except per HPI   [ ] Unable to assess ROS because ________    OBJECTIVE:  ICU Vital Signs Last 24 Hrs  T(C): 37.3 (04 Nov 2024 10:20), Max: 37.6 (04 Nov 2024 05:30)  T(F): 99.2 (04 Nov 2024 10:20), Max: 99.7 (04 Nov 2024 05:30)  HR: 101 (04 Nov 2024 10:20) (83 - 102)  BP: 155/73 (04 Nov 2024 10:20) (133/67 - 164/76)  BP(mean): --  ABP: --  ABP(mean): --  RR: 17 (04 Nov 2024 05:30) (17 - 19)  SpO2: 95% (04 Nov 2024 11:45) (94% - 100%)    O2 Parameters below as of 04 Nov 2024 10:20  Patient On (Oxygen Delivery Method): ventilator          Mode: AC/ CMV (Assist Control/ Continuous Mandatory Ventilation), RR (machine): 16, TV (machine): 400, FiO2: 35, PEEP: 5, ITime: 1, MAP: 9, PIP: 23    11-03 @ 07:01  -  11-04 @ 07:00  --------------------------------------------------------  IN: 1040 mL / OUT: 0 mL / NET: 1040 mL        PHYSICAL EXAM:  GENERAL: NAD  EYES: EOMI, PERRLA, conjunctiva and sclera clear  ENMT: No tonsillar erythema, exudates, or enlargement; Moist mucous membranes, Good dentition, No lesions  NECK: Tracheostomy in place  CHEST/LUNG: Clear to auscultation bilaterally; No rales, rhonchi, wheezing, or rubs  HEART: Regular rate and rhythm; No murmurs, rubs, or gallops  ABDOMEN: Soft, Nontender, Nondistended; Bowel sounds present  VASCULAR:  2+ Peripheral Pulses, No clubbing, cyanosis, or edema  LYMPH: No lymphadenopathy noted  SKIN: No rashes or lesions  NERVOUS SYSTEM:  Alert & Oriented X0    HOSPITAL MEDICATIONS:  MEDICATIONS  (STANDING):  albuterol/ipratropium for Nebulization 3 milliLiter(s) Nebulizer every 6 hours  chlorhexidine 0.12% Liquid 15 milliLiter(s) Oral Mucosa every 12 hours  chlorhexidine 2% Cloths 1 Application(s) Topical <User Schedule>  dextrose 5%. 1000 milliLiter(s) (50 mL/Hr) IV Continuous <Continuous>  dextrose 5%. 1000 milliLiter(s) (100 mL/Hr) IV Continuous <Continuous>  dextrose 50% Injectable 25 Gram(s) IV Push once  dextrose 50% Injectable 25 Gram(s) IV Push once  dextrose 50% Injectable 12.5 Gram(s) IV Push once  epoetin reilly-epbx (RETACRIT) Injectable 70838 Unit(s) IV Push <User Schedule>  ferrous    sulfate Liquid 300 milliGRAM(s) Enteral Tube daily  glucagon  Injectable 1 milliGRAM(s) IntraMuscular once  heparin   Injectable 5000 Unit(s) SubCutaneous every 12 hours  hydrALAZINE 50 milliGRAM(s) Oral every 8 hours  insulin glargine Injectable (LANTUS) 25 Unit(s) SubCutaneous at bedtime  insulin lispro (ADMELOG) corrective regimen sliding scale   SubCutaneous every 6 hours  lactobacillus acidophilus 1 Tablet(s) Oral daily  Nephro-noam 1 Tablet(s) Oral daily  nystatin Powder 1 Application(s) Topical every 8 hours  pantoprazole   Suspension 40 milliGRAM(s) Enteral Tube daily  sodium chloride 3%  Inhalation 4 milliLiter(s) Inhalation every 6 hours    MEDICATIONS  (PRN):  acetaminophen   Oral Liquid .. 650 milliGRAM(s) Oral every 6 hours PRN Temp greater or equal to 38.5C (101.3F)  dextrose Oral Gel 15 Gram(s) Oral once PRN Blood Glucose LESS THAN 70 milliGRAM(s)/deciliter      LABS:    The Labs were reviewed by me   The Radiology was reviewed by me    EKG tracing reviewed by me    11-04    131[L]  |  95[L]  |  67[H]  ----------------------------<  204[H]  4.2   |  23  |  4.26[H]  11-03    133[L]  |  97  |  48[H]  ----------------------------<  210[H]  4.2   |  23  |  3.21[H]  11-01    132[L]  |  94[L]  |  58[H]  ----------------------------<  247[H]  3.4[L]   |  27  |  3.80[H]    Ca    9.6      04 Nov 2024 06:52  Ca    9.3      03 Nov 2024 06:40  Ca    9.2      01 Nov 2024 19:05  Phos  3.4     11-04  Mg     2.4     11-04    TPro  7.2  /  Alb  1.6[L]  /  TBili  0.4  /  DBili  x   /  AST  23  /  ALT  17  /  AlkPhos  195[H]  11-04  TPro  7.3  /  Alb  1.7[L]  /  TBili  0.3  /  DBili  x   /  AST  29  /  ALT  20  /  AlkPhos  267[H]  11-01    Magnesium: 2.4 mg/dL (11-04-24 @ 06:52)  Magnesium: 2.2 mg/dL (11-01-24 @ 19:05)    Phosphorus: 3.4 mg/dL (11-04-24 @ 06:52)                    Urinalysis Basic - ( 04 Nov 2024 06:52 )    Color: x / Appearance: x / SG: x / pH: x  Gluc: 204 mg/dL / Ketone: x  / Bili: x / Urobili: x   Blood: x / Protein: x / Nitrite: x   Leuk Esterase: x / RBC: x / WBC x   Sq Epi: x / Non Sq Epi: x / Bacteria: x                              8.5    15.89 )-----------( 417      ( 04 Nov 2024 06:52 )             26.7     CAPILLARY BLOOD GLUCOSE      POCT Blood Glucose.: 185 mg/dL (04 Nov 2024 11:13)  POCT Blood Glucose.: 184 mg/dL (04 Nov 2024 07:52)  POCT Blood Glucose.: 217 mg/dL (04 Nov 2024 05:55)  POCT Blood Glucose.: 270 mg/dL (04 Nov 2024 00:58)  POCT Blood Glucose.: 274 mg/dL (03 Nov 2024 21:25)  POCT Blood Glucose.: 198 mg/dL (03 Nov 2024 16:39)        MICROBIOLOGY:     RADIOLOGY:  [ ] Reviewed and interpreted by me    Point of Care Ultrasound Findings:    PFT:    EKG:

## 2024-11-04 NOTE — PROGRESS NOTE ADULT - SUBJECTIVE AND OBJECTIVE BOX
BALDO JIMMY  73y  Patient is a 73y old  Male who presents with a chief complaint of Sepsis and acute hypoxic respiratory failure secondary to suspected aspiration PNA vs. HCAP (2024 12:22)    HPI:  ESRD on HD without incident.  No new complaints.    HEALTH ISSUES - PROBLEM Dx:  Sepsis due to pneumonia    Acute respiratory failure with hypoxia    Hypertensive urgency    Constipation    Lactic acidosis    Insulin dependent type 2 diabetes mellitus    ESRD on hemodialysis    History of cerebrovascular accident (CVA) with residual deficit    History of DVT of lower extremity    Microcytic anemia    Hypoxia    Severe protein-calorie malnutrition    Encounter for palliative care          MEDICATIONS  (STANDING):  albuterol/ipratropium for Nebulization 3 milliLiter(s) Nebulizer every 6 hours  chlorhexidine 0.12% Liquid 15 milliLiter(s) Oral Mucosa every 12 hours  chlorhexidine 2% Cloths 1 Application(s) Topical <User Schedule>  dextrose 5%. 1000 milliLiter(s) (50 mL/Hr) IV Continuous <Continuous>  dextrose 5%. 1000 milliLiter(s) (100 mL/Hr) IV Continuous <Continuous>  dextrose 50% Injectable 12.5 Gram(s) IV Push once  dextrose 50% Injectable 25 Gram(s) IV Push once  dextrose 50% Injectable 25 Gram(s) IV Push once  epoetin reilly-epbx (RETACRIT) Injectable 45673 Unit(s) IV Push <User Schedule>  ferrous    sulfate Liquid 300 milliGRAM(s) Enteral Tube daily  glucagon  Injectable 1 milliGRAM(s) IntraMuscular once  heparin   Injectable 5000 Unit(s) SubCutaneous every 12 hours  hydrALAZINE 50 milliGRAM(s) Oral every 8 hours  insulin glargine Injectable (LANTUS) 25 Unit(s) SubCutaneous at bedtime  insulin lispro (ADMELOG) corrective regimen sliding scale   SubCutaneous every 6 hours  lactobacillus acidophilus 1 Tablet(s) Oral daily  Nephro-noam 1 Tablet(s) Oral daily  nystatin Powder 1 Application(s) Topical every 8 hours  pantoprazole   Suspension 40 milliGRAM(s) Enteral Tube daily  sodium chloride 3%  Inhalation 4 milliLiter(s) Inhalation every 6 hours    MEDICATIONS  (PRN):  acetaminophen   Oral Liquid .. 650 milliGRAM(s) Oral every 6 hours PRN Temp greater or equal to 38.5C (101.3F)  dextrose Oral Gel 15 Gram(s) Oral once PRN Blood Glucose LESS THAN 70 milliGRAM(s)/deciliter    Vital Signs Last 24 Hrs  T(C): 39.1 (2024 13:03), Max: 39.1 (2024 13:03)  T(F): 102.3 (2024 13:03), Max: 102.3 (2024 13:03)  HR: 116 (2024 13:43) (83 - 119)  BP: 169/72 (2024 13:43) (133/67 - 169/72)  BP(mean): --  RR: 17 (2024 05:30) (17 - 19)  SpO2: 95% (2024 13:03) (94% - 100%)    Parameters below as of 2024 13:03  Patient On (Oxygen Delivery Method): ventilator      Daily     Daily Weight in k (2024 05:30)    PHYSICAL EXAM:  Constitutional:  He appears comfortable and not distressed. Not diaphoretic.    Neck:  The thyroid is normal. Trachea is midline.     Respiratory: The lungs are clear to auscultation. No dullness and expansion is normal.    Cardiovascular: S1 and S2 are normal. No mummurs, rubs or gallops are present.    Gastrointestinal: The abdomen is soft. No tenderness is present. No masses are present. Bowel sounds are normal.    Genitourinary: The bladder is not distended. No CVA tenderness is present.    Extremities: No edema is noted. No deformities are present.    Skin: No lesions are seen  or palpated.    Lymph Nodes: No lymphadenopathy is present.                            8.5    15.89 )-----------( 417      ( 2024 06:52 )             26.7         131[L]  |  95[L]  |  67[H]  ----------------------------<  204[H]  4.2   |  23  |  4.26[H]    Ca    9.6      2024 06:52  Phos  3.4       Mg     2.4         TPro  7.2  /  Alb  1.6[L]  /  TBili  0.4  /  DBili  x   /  AST  23  /  ALT  17  /  AlkPhos  195[H]

## 2024-11-04 NOTE — PROGRESS NOTE ADULT - SUBJECTIVE AND OBJECTIVE BOX
CHIEF COMPLAINT:   Pneumonia / respiratory failure     Interval Events:  - fever 103 over weekend   - WBC trending up       OBJECTIVE:  ICU Vital Signs Last 24 Hrs  T(C): 37.3 (04 Nov 2024 10:20), Max: 37.6 (04 Nov 2024 05:30)  T(F): 99.2 (04 Nov 2024 10:20), Max: 99.7 (04 Nov 2024 05:30)  HR: 101 (04 Nov 2024 10:20) (83 - 102)  BP: 155/73 (04 Nov 2024 10:20) (133/67 - 164/76)  RR: 17 (04 Nov 2024 05:30) (17 - 19)  SpO2: 95% (04 Nov 2024 11:45) (94% - 100%)    O2 Parameters below as of 04 Nov 2024 10:20  Patient On (Oxygen Delivery Method): ventilator          Mode: AC/ CMV (Assist Control/ Continuous Mandatory Ventilation), RR (machine): 16, TV (machine): 400, FiO2: 35, PEEP: 5, ITime: 1, MAP: 9, PIP: 23    11-03 @ 07:01  -  11-04 @ 07:00  --------------------------------------------------------  IN: 1040 mL / OUT: 0 mL / NET: 1040 mL      CAPILLARY BLOOD GLUCOSE  POCT Blood Glucose.: 185 mg/dL (04 Nov 2024 11:13)      PHYSICAL EXAM:  General: well, no distress   HEENT: Sclera clear, EOMI   Neck: trache   Respiratory: scattered rhonchi , white sputum via trach   Cardiovascular: S1S2 RRR  Abdomen: soft + BS , PEG , + diarrhea   Extremities: no edema, THAI       HOSPITAL MEDICATIONS:  heparin   Injectable 5000 Unit(s) SubCutaneous every 12 hours  hydrALAZINE 50 milliGRAM(s) Oral every 8 hours  dextrose 50% Injectable 12.5 Gram(s) IV Push once  dextrose 50% Injectable 25 Gram(s) IV Push once  dextrose 50% Injectable 25 Gram(s) IV Push once  dextrose Oral Gel 15 Gram(s) Oral once PRN  glucagon  Injectable 1 milliGRAM(s) IntraMuscular once  insulin glargine Injectable (LANTUS) 25 Unit(s) SubCutaneous at bedtime  insulin lispro (ADMELOG) corrective regimen sliding scale   SubCutaneous every 6 hours  albuterol/ipratropium for Nebulization 3 milliLiter(s) Nebulizer every 6 hours  sodium chloride 3%  Inhalation 4 milliLiter(s) Inhalation every 6 hours  acetaminophen   Oral Liquid .. 650 milliGRAM(s) Oral every 6 hours PRN  pantoprazole   Suspension 40 milliGRAM(s) Enteral Tube daily  dextrose 5%. 1000 milliLiter(s) IV Continuous <Continuous>  dextrose 5%. 1000 milliLiter(s) IV Continuous <Continuous>  ferrous    sulfate Liquid 300 milliGRAM(s) Enteral Tube daily  Nephro-noam 1 Tablet(s) Oral daily  epoetin reilly-epbx (RETACRIT) Injectable 36263 Unit(s) IV Push <User Schedule>  chlorhexidine 0.12% Liquid 15 milliLiter(s) Oral Mucosa every 12 hours  chlorhexidine 2% Cloths 1 Application(s) Topical <User Schedule>  nystatin Powder 1 Application(s) Topical every 8 hours    lactobacillus acidophilus 1 Tablet(s) Oral daily      LABS:                        8.5    15.89 )-----------( 417      ( 04 Nov 2024 06:52 )             26.7     11-04    131[L]  |  95[L]  |  67[H]  ----------------------------<  204[H]  4.2   |  23  |  4.26[H]    Ca    9.6      04 Nov 2024 06:52  Phos  3.4     11-04  Mg     2.4     11-04    TPro  7.2  /  Alb  1.6[L]  /  TBili  0.4  /  DBili  x   /  AST  23  /  ALT  17  /  AlkPhos  195[H]  11-04        MICROBIOLOGY:   Culture Results:   Moderate Pseudomonas aeruginosa  Commensal dominick consistent with body site (11-02-24 @ 06:50)  Culture Results:   No growth at 48 Hours (11-01-24 @ 19:05)  Culture Results:   No growth at 5 days (10-28-24 @ 15:00)  Culture Results:   No growth at 5 days (10-28-24 @ 15:00)  Culture Results:   No acid-fast bacilli isolated at 1 week. ****Acid-fast cultures are held  for 6 weeks.**** (10-25-24 @ 18:35)  Culture Results:   Moderate Pseudomonas aeruginosa (Carbapenem Resistant)  Commensal dominick consistent with body site (10-25-24 @ 17:30)  Culture Results:   No growth at 5 days (10-25-24 @ 16:55)  Culture Results:   No growth at 5 days (10-25-24 @ 16:55)  Culture Results:   No growth at 5 days (10-18-24 @ 11:10)  Culture Results:   No acid-fast bacilli isolated at 2 weeks. (10-14-24 @ 11:20)  Culture Results:   Few Pseudomonas aeruginosa  Commensal dominick consistent with body site (10-14-24 @ 11:20)    LIVER FUNCTIONS - ( 04 Nov 2024 06:52 )  Alb: 1.6 g/dL / Pro: 7.2 gm/dL / ALK PHOS: 195 U/L / ALT: 17 U/L / AST: 23 U/L / GGT: x             Urinalysis Basic - ( 04 Nov 2024 06:52 )    Color: x / Appearance: x / SG: x / pH: x  Gluc: 204 mg/dL / Ketone: x  / Bili: x / Urobili: x   Blood: x / Protein: x / Nitrite: x   Leuk Esterase: x / RBC: x / WBC x   Sq Epi: x / Non Sq Epi: x / Bacteria: x          Culture - Sputum (collected 02 Nov 2024 06:50)  Source: Trach Asp Tracheal Aspirate  Gram Stain (prelim) (03 Nov 2024 09:47):    Rare polymorphonuclear leukocytes per low power field    Few Squamous epithelial cells per low power field    Few Gram Negative Rods per oil power field    Rare Gram positive cocci in pairs per oil power field    Rare Yeast like cells per oil power field  Preliminary Report (03 Nov 2024 09:47):    Moderate Pseudomonas aeruginosa    Commensal dominick consistent with body site  Organism: Pseudomonas aeruginosa (04 Nov 2024 11:19)  Organism: Pseudomonas aeruginosa (04 Nov 2024 11:19)    Culture - Blood (collected 01 Nov 2024 19:05)  Source: .Blood BLOOD  Preliminary Report (04 Nov 2024 01:01):    No growth at 48 Hours        RADIOLOGY:        EKG/ECHO:

## 2024-11-04 NOTE — PROGRESS NOTE ADULT - ASSESSMENT
ESRD:  On HD TIW via an A-V fistula.  - HD on TTS schedule, via A-V fistula.   - Placement at [facility].  - Limit UF due to Hypotension.  - Midodrine as needed.      Anemia:  - Transfuse for Hg < 7.  - Continue EPO.    Respiratory Failure:  Failed extubation.  - s/p Tracheostomy.    Otf Mena MD  Nephrology

## 2024-11-04 NOTE — PROGRESS NOTE ADULT - ASSESSMENT
73-year-old male with hypertension, type 2 diabetes, ESRD on hemodialysis, CVA with residual right-sided weakness and chronic dysphagia status post PEG and trach previous decannulated, right lower extremity DVT recently mated for acute hypoxic respiratory failure secondary to right lower lobe pneumonia due to aspiration.  Patient failed multiple attempts at extubation and tracheostomy was placed on October 23. Pulmonary consulted for continued ventilator management.    Plan  – Patient currently on Zerbaxa for multidrug-resistant Pseudomonas aeruginosa's in the sputum  – Pressure support as tolerated  – Aggressive pulmonary toileting with chest PT and DuoNebs and hypertonic saline  – Tracheostomy care per routine with frequent suctioning  – HD per nephrology  – Continue to work with social work and case management on long-term placement  – Rest of care per primary team

## 2024-11-04 NOTE — PROGRESS NOTE ADULT - SUBJECTIVE AND OBJECTIVE BOX
Patient is a 73y old  Male who presents with a chief complaint of Sepsis and acute hypoxic respiratory failure secondary to suspected aspiration PNA vs. HCAP (2024 15:42)    INTERVAL HPI/OVERNIGHT EVENTS: Patients seen and examined at bedside this morning. No acute events overnight.     MEDICATIONS  (STANDING):  albuterol/ipratropium for Nebulization 3 milliLiter(s) Nebulizer every 6 hours  chlorhexidine 0.12% Liquid 15 milliLiter(s) Oral Mucosa every 12 hours  chlorhexidine 2% Cloths 1 Application(s) Topical <User Schedule>  dextrose 5%. 1000 milliLiter(s) (50 mL/Hr) IV Continuous <Continuous>  dextrose 5%. 1000 milliLiter(s) (100 mL/Hr) IV Continuous <Continuous>  dextrose 50% Injectable 12.5 Gram(s) IV Push once  dextrose 50% Injectable 25 Gram(s) IV Push once  dextrose 50% Injectable 25 Gram(s) IV Push once  epoetin reilly-epbx (RETACRIT) Injectable 74376 Unit(s) IV Push <User Schedule>  ferrous    sulfate Liquid 300 milliGRAM(s) Enteral Tube daily  glucagon  Injectable 1 milliGRAM(s) IntraMuscular once  heparin   Injectable 5000 Unit(s) SubCutaneous every 12 hours  hydrALAZINE 50 milliGRAM(s) Oral every 8 hours  insulin glargine Injectable (LANTUS) 25 Unit(s) SubCutaneous at bedtime  insulin lispro (ADMELOG) corrective regimen sliding scale   SubCutaneous every 6 hours  lactobacillus acidophilus 1 Tablet(s) Oral daily  Nephro-noam 1 Tablet(s) Oral daily  nystatin Powder 1 Application(s) Topical every 8 hours  pantoprazole   Suspension 40 milliGRAM(s) Enteral Tube daily  sodium chloride 3%  Inhalation 4 milliLiter(s) Inhalation every 6 hours    MEDICATIONS  (PRN):  acetaminophen   Oral Liquid .. 650 milliGRAM(s) Oral every 6 hours PRN Temp greater or equal to 38.5C (101.3F)  dextrose Oral Gel 15 Gram(s) Oral once PRN Blood Glucose LESS THAN 70 milliGRAM(s)/deciliter    Allergies    No Known Allergies    Intolerances      REVIEW OF SYSTEMS:  All other systems reviewed and are negative    Vital Signs Last 24 Hrs  T(C): 37.7 (2024 16:00), Max: 39.1 (2024 13:03)  T(F): 99.8 (2024 16:00), Max: 102.3 (2024 13:03)  HR: 101 (2024 16:00) (83 - 119)  BP: 156/73 (2024 16:00) (155/73 - 169/72)  BP(mean): 101 (2024 16:00) (101 - 101)  RR: 17 (2024 05:30) (17 - 19)  SpO2: 99% (2024 16:00) (94% - 100%)    Parameters below as of 2024 16:00  Patient On (Oxygen Delivery Method): ventilator      Daily     Daily Weight in k (2024 05:30)  I&O's Summary    2024 07:01  -  2024 07:00  --------------------------------------------------------  IN: 1040 mL / OUT: 0 mL / NET: 1040 mL      CAPILLARY BLOOD GLUCOSE      POCT Blood Glucose.: 185 mg/dL (2024 11:13)  POCT Blood Glucose.: 184 mg/dL (2024 07:52)  POCT Blood Glucose.: 217 mg/dL (2024 05:55)  POCT Blood Glucose.: 270 mg/dL (2024 00:58)  POCT Blood Glucose.: 274 mg/dL (2024 21:25)    PHYSICAL EXAM:  HEENT: s/p tracheostomy  RESPIRATORY: Normal respiratory effort; no wheezing  CARDIOVASCULAR: Regular rate and rhythm, normal S1 and S2, no murmur/rub/gallop; No lower extremity edema  ABDOMEN: Nontender to palpation, normoactive bowel sounds, no rebound/guarding  MUSCLOSKELETAL: no clubbing or cyanosis of digits, dec ROM of b/l LE  PSYCH: A+O to person  NEURO: awake, able to follow commands    Labs                          8.5    15.89 )-----------( 417      ( 2024 06:52 )             26.7     11-04    131[L]  |  95[L]  |  67[H]  ----------------------------<  204[H]  4.2   |  23  |  4.26[H]    Ca    9.6      2024 06:52  Phos  3.4     11  Mg     2.4         TPro  7.2  /  Alb  1.6[L]  /  TBili  0.4  /  DBili  x   /  AST  23  /  ALT  17  /  AlkPhos  195[H]  11-04          Urinalysis Basic - ( 2024 06:52 )    Color: x / Appearance: x / SG: x / pH: x  Gluc: 204 mg/dL / Ketone: x  / Bili: x / Urobili: x   Blood: x / Protein: x / Nitrite: x   Leuk Esterase: x / RBC: x / WBC x   Sq Epi: x / Non Sq Epi: x / Bacteria: x        Culture - Sputum (collected 2024 06:50)  Source: Trach Asp Tracheal Aspirate  Gram Stain (prelim) (2024 09:47):    Rare polymorphonuclear leukocytes per low power field    Few Squamous epithelial cells per low power field    Few Gram Negative Rods per oil power field    Rare Gram positive cocci in pairs per oil power field    Rare Yeast like cells per oil power field  Preliminary Report (2024 14:55):    Moderate Pseudomonas aeruginosa    Moderate Stenotrophomonas maltophilia    Commensal dominick consistent with body site  Organism: Pseudomonas aeruginosa (2024 11:19)  Organism: Pseudomonas aeruginosa (2024 11:19)    Culture - Blood (collected 2024 19:05)  Source: .Blood BLOOD  Preliminary Report (2024 01:01):    No growth at 48 Hours                    Radiology and Imaging reviewed.

## 2024-11-04 NOTE — PROGRESS NOTE ADULT - ASSESSMENT
72 yo M with h/o HTN, DM2, ESRD on HD (MWF, RUE AV Fistula), CVA with residual R sided weakness and dysphagia s/p peg, s/p trach since decannulated, RLE DVT (completed course of Eliquis) admitted with 1) RLL PNA with course complicated by acute hypoxic respiratory failure ultimately requiring intubation, failed trial of extubation and was subsequently reintubated 10/14 and tracheostomy on 10/14. 2) Septic shock 2 to PNA    Recs:    - new fever over weekend , WBC trending up   - Patient admitted w/ Septic shock POA due to RLL PNA with course complicated by acute hypoxic respiratory failure ultimately requiring admission to the ICU for intubation on 10/8  - Failed trial of extubation and was subsequently reintubated on 10/14  - Tracheostomy placed by surgery on 10/23; has a chronic PEG tube already   - will ask surgery to remove sutures for trach   - He is s/p bronch x 2. BAL with PsAg and Stenotrophomonas & bronch culture with Pseudomonas aeruginosa on 10/8 & 10/14  - thick whites secretions with suctioning   - completed course of zosyn and levaquin.   - Currently on zerbaxa  for carbapenem resistant pseudomonas PNA, antibx per ID. f/u reccs  - continue aggressive pulm toileting modalities with chest PT, duonebs and hypersal   - Trach to vent; appears to tolerate SBT at times with higher settings like 12/5  will continue to attempt daily, but may need to wait for infection to improve to wean better  - maintain on duonebs and Hypersal with thick secretions from infection   - HD per nephrology   - rest of care per primary team     Plan discussed with pulm attending Dr. Man

## 2024-11-05 LAB
GLUCOSE BLDC GLUCOMTR-MCNC: 140 MG/DL — HIGH (ref 70–99)
GLUCOSE BLDC GLUCOMTR-MCNC: 190 MG/DL — HIGH (ref 70–99)
GLUCOSE BLDC GLUCOMTR-MCNC: 200 MG/DL — HIGH (ref 70–99)
GLUCOSE BLDC GLUCOMTR-MCNC: 225 MG/DL — HIGH (ref 70–99)
GLUCOSE BLDC GLUCOMTR-MCNC: 257 MG/DL — HIGH (ref 70–99)

## 2024-11-05 PROCEDURE — 99233 SBSQ HOSP IP/OBS HIGH 50: CPT

## 2024-11-05 RX ADMIN — HYDRALAZINE HYDROCHLORIDE 50 MILLIGRAM(S): 50 TABLET, FILM COATED ORAL at 21:39

## 2024-11-05 RX ADMIN — HEPARIN SODIUM 5000 UNIT(S): 10000 INJECTION INTRAVENOUS; SUBCUTANEOUS at 05:35

## 2024-11-05 RX ADMIN — Medication 4: at 01:23

## 2024-11-05 RX ADMIN — HEPARIN SODIUM 5000 UNIT(S): 10000 INJECTION INTRAVENOUS; SUBCUTANEOUS at 17:21

## 2024-11-05 RX ADMIN — Medication 300 MILLIGRAM(S): at 11:38

## 2024-11-05 RX ADMIN — NYSTATIN 1 APPLICATION(S): 100000 POWDER TOPICAL at 05:34

## 2024-11-05 RX ADMIN — CHLORHEXIDINE GLUCONATE 15 MILLILITER(S): 40 SOLUTION TOPICAL at 17:22

## 2024-11-05 RX ADMIN — ERYTHROPOIETIN 10000 UNIT(S): 10000 INJECTION, SOLUTION INTRAVENOUS; SUBCUTANEOUS at 14:07

## 2024-11-05 RX ADMIN — SODIUM CHLORIDE 4 MILLILITER(S): 9 INJECTION, SOLUTION INTRAMUSCULAR; INTRAVENOUS; SUBCUTANEOUS at 00:48

## 2024-11-05 RX ADMIN — SODIUM CHLORIDE 4 MILLILITER(S): 9 INJECTION, SOLUTION INTRAMUSCULAR; INTRAVENOUS; SUBCUTANEOUS at 17:19

## 2024-11-05 RX ADMIN — IPRATROPIUM BROMIDE AND ALBUTEROL SULFATE 3 MILLILITER(S): .5; 2.5 SOLUTION RESPIRATORY (INHALATION) at 17:19

## 2024-11-05 RX ADMIN — IPRATROPIUM BROMIDE AND ALBUTEROL SULFATE 3 MILLILITER(S): .5; 2.5 SOLUTION RESPIRATORY (INHALATION) at 05:40

## 2024-11-05 RX ADMIN — CHLORHEXIDINE GLUCONATE 1 APPLICATION(S): 40 SOLUTION TOPICAL at 05:33

## 2024-11-05 RX ADMIN — Medication 25 UNIT(S): at 21:39

## 2024-11-05 RX ADMIN — IPRATROPIUM BROMIDE AND ALBUTEROL SULFATE 3 MILLILITER(S): .5; 2.5 SOLUTION RESPIRATORY (INHALATION) at 00:47

## 2024-11-05 RX ADMIN — NYSTATIN 1 APPLICATION(S): 100000 POWDER TOPICAL at 13:23

## 2024-11-05 RX ADMIN — Medication 2: at 11:37

## 2024-11-05 RX ADMIN — HYDRALAZINE HYDROCHLORIDE 50 MILLIGRAM(S): 50 TABLET, FILM COATED ORAL at 13:23

## 2024-11-05 RX ADMIN — IPRATROPIUM BROMIDE AND ALBUTEROL SULFATE 3 MILLILITER(S): .5; 2.5 SOLUTION RESPIRATORY (INHALATION) at 11:19

## 2024-11-05 RX ADMIN — Medication 1 TABLET(S): at 11:38

## 2024-11-05 RX ADMIN — Medication 2: at 06:10

## 2024-11-05 RX ADMIN — CHLORHEXIDINE GLUCONATE 15 MILLILITER(S): 40 SOLUTION TOPICAL at 05:35

## 2024-11-05 RX ADMIN — NYSTATIN 1 APPLICATION(S): 100000 POWDER TOPICAL at 21:40

## 2024-11-05 RX ADMIN — HYDRALAZINE HYDROCHLORIDE 50 MILLIGRAM(S): 50 TABLET, FILM COATED ORAL at 05:35

## 2024-11-05 RX ADMIN — SODIUM CHLORIDE 4 MILLILITER(S): 9 INJECTION, SOLUTION INTRAMUSCULAR; INTRAVENOUS; SUBCUTANEOUS at 05:43

## 2024-11-05 RX ADMIN — SODIUM CHLORIDE 4 MILLILITER(S): 9 INJECTION, SOLUTION INTRAMUSCULAR; INTRAVENOUS; SUBCUTANEOUS at 11:19

## 2024-11-05 RX ADMIN — Medication 1 TABLET(S): at 11:39

## 2024-11-05 RX ADMIN — PANTOPRAZOLE SODIUM 40 MILLIGRAM(S): 40 TABLET, DELAYED RELEASE ORAL at 11:38

## 2024-11-05 NOTE — CHART NOTE - NSCHARTNOTEFT_GEN_A_CORE
Assessment:  Pt seen in 2C unit, on contact isolation, trach->vent, on 18 hour enteral feeding regimen c Nepro as per previous ICU adm order.  Pt adm c diagnosis of pneumonia, ESRD on HD, acute respiratory failure, c recurrent aspirations following extubation and required reintubation, septic shock, fevers and leukocytosis, post tracheostomy, anemia, diarrhea, c rectal tube. Pt pending discharge to [facility].   Pt is full code.  PMH include DVT, CVA, DM Type 2 on insulin, HTN, ESRD on HD, dysphagia, PEG, bolus feeding regimen, constipation.       Factors impacting intake: [ ] none [ ] nausea  [ ] vomiting [x ] diarrhea [ ] constipation  [ ]chewing problems [ ] swallowing issues  [ x] other: on G-Tube feeding    Diet Prescription: Diet, NPO with Tube Feed:   Tube Feeding Modality: Gastrostomy  Nepro with Carb Steady  Total Volume for 24 Hours (mL): 900  Continuous  Starting Tube Feed Rate {mL per Hour}: 25  Increase Tube Feed Rate by (mL): 5     Every 12 hours  Until Goal Tube Feed Rate (mL per Hour): 50  Tube Feed Duration (in Hours): 18  Tube Feed Start Time: 17:00  Tube Feed Stop Time: 11:00  Liquid Protein Supplement     Qty per Day:  1 (10-23-24 @ 11:28)    Intake: as per I/O, pt received 200 ml Nepro on 11/04, spoke to RN who stated that pt is getting Nepro @ 50 ml/hr x 18 hours, however was not documented.  Nepro 900 ml per order above=1620 calories, 73 grams protein, 644 ml free water  + on Liquid protein supplement 1 pkg=15 grams protein, 100 calories for a total of 1720 calories and 83 grams protein.    Current Weight: 11/04, 40 kg, 10/29, 45 kg, 10/22, 40.4 kg, 10/16, 40.9 kg, 10/05, 43.5 kg (drug dose/adm wt.), wt. fluctuations c wt. loss of 3.5 kg since adm noted  % Weight Change: 8.0%  10/31, no edema noted  I/O: 300/520(rectal tube included, no urine output noted)      Pertinent Medications: MEDICATIONS  (STANDING):  albuterol/ipratropium for Nebulization 3 milliLiter(s) Nebulizer every 6 hours  chlorhexidine 0.12% Liquid 15 milliLiter(s) Oral Mucosa every 12 hours  chlorhexidine 2% Cloths 1 Application(s) Topical <User Schedule>  dextrose 5%. 1000 milliLiter(s) (50 mL/Hr) IV Continuous <Continuous>  dextrose 5%. 1000 milliLiter(s) (100 mL/Hr) IV Continuous <Continuous>  dextrose 50% Injectable 12.5 Gram(s) IV Push once  dextrose 50% Injectable 25 Gram(s) IV Push once  dextrose 50% Injectable 25 Gram(s) IV Push once  epoetin reilly-epbx (RETACRIT) Injectable 68107 Unit(s) IV Push <User Schedule>  ferrous    sulfate Liquid 300 milliGRAM(s) Enteral Tube daily  glucagon  Injectable 1 milliGRAM(s) IntraMuscular once  heparin   Injectable 5000 Unit(s) SubCutaneous every 12 hours  hydrALAZINE 50 milliGRAM(s) Oral every 8 hours  insulin glargine Injectable (LANTUS) 25 Unit(s) SubCutaneous at bedtime  insulin lispro (ADMELOG) corrective regimen sliding scale   SubCutaneous every 6 hours  lactobacillus acidophilus 1 Tablet(s) Oral daily  Nephro-noam 1 Tablet(s) Oral daily  nystatin Powder 1 Application(s) Topical every 8 hours  pantoprazole   Suspension 40 milliGRAM(s) Enteral Tube daily  sodium chloride 3%  Inhalation 4 milliLiter(s) Inhalation every 6 hours    MEDICATIONS  (PRN):  acetaminophen   Oral Liquid .. 650 milliGRAM(s) Oral every 6 hours PRN Temp greater or equal to 38.5C (101.3F)  dextrose Oral Gel 15 Gram(s) Oral once PRN Blood Glucose LESS THAN 70 milliGRAM(s)/deciliter    Pertinent Labs: 11-04 Na131 mmol/L[L] Glu 204 mg/dL[H] K+ 4.2 mmol/L Cr  4.26 mg/dL[H] BUN 67 mg/dL[H] 11-04 Phos 3.4 mg/dL 11-04 Alb 1.6 g/dL[L] 10-18 Chol --    LDL --    HDL --    Trig 231 mg/dL[H]11-04 ALT 17 U/L AST 23 U/L Alkaline Phosphatase 195 U/L[H]   CAPILLARY BLOOD GLUCOSE      POCT Blood Glucose.: 200 mg/dL (05 Nov 2024 11:00)  POCT Blood Glucose.: 190 mg/dL (05 Nov 2024 05:57)  POCT Blood Glucose.: 225 mg/dL (05 Nov 2024 01:16)  POCT Blood Glucose.: 174 mg/dL (04 Nov 2024 22:10)  POCT Blood Glucose.: 153 mg/dL (04 Nov 2024 17:13)    Skin:   Pressure ulcer x 1  1. 11/04, left buttock; stage II noted    Estimated Needs:   [x ] no change since previous assessment(10/06)  [ ] recalculated:       Previous New Nutrition Diagnosis: (10/22)  [x ] Malnutrition; severe malnutrition in context of acute illness   Related to: inadequate protein-energy intake in setting of acute respiratory failure, septic shock, pneumonia, pressure ulcer  As evidenced by: <50% nutrition needs > 5 days, >5% wt. loss in 1 month  Goal: pt to meet >75% protein-energy needs via enteral feeing; ? due to inaccurate I/O  Nutrition Diagnosis is [x ] ongoing  [ ] resolved [ ] not applicable       New Nutrition Diagnosis: [ x] not applicable     Interventions:   Recommend  [ ] Change Diet To:  [ ] Nutrition Supplement  [ x] Nutrition Support; Nepro @ goal rate 40 mL/hr x 24 hrs to ensure adequate delivery of feeding =960 ml, 1728 karla, 78 gm pro, 701 mL free water, 101% RDIs   [x] Liquid protein supplement 1 pkg=15 grams protein, 100 calories for a total of 1828 calories, 93 grams protein  [ x] Other: 100 ml water flush q 8 hours to meet free fluid needs of 1.8 karla/ml formula of Nepro    Monitoring and Evaluation:   [ ] PO intake [ x ] Tolerance to diet prescription [ x ] weights [ x ] labs[ x ] follow up per protocol  [x ] other: I/O Assessment:  Pt seen in 2C unit, on contact isolation, trach->vent, on 18 hour enteral feeding regimen c Nepro as per previous ICU adm order.  Pt adm c diagnosis of pneumonia, ESRD on HD, acute respiratory failure, c recurrent aspirations following extubation and required reintubation, septic shock, fevers and leukocytosis, post tracheostomy, anemia, diarrhea, c rectal tube. Pt pending discharge to [facility].   Pt is full code.  PMH include DVT, CVA, DM Type 2 on insulin, HTN, ESRD on HD, dysphagia, PEG, bolus feeding regimen, constipation.       Factors impacting intake: [ ] none [ ] nausea  [ ] vomiting [x ] diarrhea(on lactobacillus acidophilus) [ ] constipation  [ ]chewing problems [ ] swallowing issues  [ x] other: on G-Tube feeding    Diet Prescription: Diet, NPO with Tube Feed:   Tube Feeding Modality: Gastrostomy  Nepro with Carb Steady  Total Volume for 24 Hours (mL): 900  Continuous  Starting Tube Feed Rate {mL per Hour}: 25  Increase Tube Feed Rate by (mL): 5     Every 12 hours  Until Goal Tube Feed Rate (mL per Hour): 50  Tube Feed Duration (in Hours): 18  Tube Feed Start Time: 17:00  Tube Feed Stop Time: 11:00  Liquid Protein Supplement     Qty per Day:  1 (10-23-24 @ 11:28)    Intake: as per I/O, pt received 200 ml Nepro on 11/04, spoke to RN who stated that pt is getting Nepro @ 50 ml/hr x 18 hours, however was not documented.  Nepro 900 ml per order above=1620 calories, 73 grams protein, 644 ml free water  + on Liquid protein supplement 1 pkg=15 grams protein, 100 calories for a total of 1720 calories and 83 grams protein.    Current Weight: 11/04, 40 kg, 10/29, 45 kg, 10/22, 40.4 kg, 10/16, 40.9 kg, 10/05, 43.5 kg (drug dose/adm wt.), wt. fluctuations c wt. loss of 3.5 kg since adm noted  % Weight Change: 8.0%  10/31, no edema noted  I/O: 300/520(rectal tube included, no urine output noted)      Pertinent Medications: MEDICATIONS  (STANDING):  albuterol/ipratropium for Nebulization 3 milliLiter(s) Nebulizer every 6 hours  chlorhexidine 0.12% Liquid 15 milliLiter(s) Oral Mucosa every 12 hours  chlorhexidine 2% Cloths 1 Application(s) Topical <User Schedule>  dextrose 5%. 1000 milliLiter(s) (50 mL/Hr) IV Continuous <Continuous>  dextrose 5%. 1000 milliLiter(s) (100 mL/Hr) IV Continuous <Continuous>  dextrose 50% Injectable 12.5 Gram(s) IV Push once  dextrose 50% Injectable 25 Gram(s) IV Push once  dextrose 50% Injectable 25 Gram(s) IV Push once  epoetin reilly-epbx (RETACRIT) Injectable 49928 Unit(s) IV Push <User Schedule>  ferrous    sulfate Liquid 300 milliGRAM(s) Enteral Tube daily  glucagon  Injectable 1 milliGRAM(s) IntraMuscular once  heparin   Injectable 5000 Unit(s) SubCutaneous every 12 hours  hydrALAZINE 50 milliGRAM(s) Oral every 8 hours  insulin glargine Injectable (LANTUS) 25 Unit(s) SubCutaneous at bedtime  insulin lispro (ADMELOG) corrective regimen sliding scale   SubCutaneous every 6 hours  lactobacillus acidophilus 1 Tablet(s) Oral daily  Nephro-noam 1 Tablet(s) Oral daily  nystatin Powder 1 Application(s) Topical every 8 hours  pantoprazole   Suspension 40 milliGRAM(s) Enteral Tube daily  sodium chloride 3%  Inhalation 4 milliLiter(s) Inhalation every 6 hours    MEDICATIONS  (PRN):  acetaminophen   Oral Liquid .. 650 milliGRAM(s) Oral every 6 hours PRN Temp greater or equal to 38.5C (101.3F)  dextrose Oral Gel 15 Gram(s) Oral once PRN Blood Glucose LESS THAN 70 milliGRAM(s)/deciliter    Pertinent Labs: 11-04 Na131 mmol/L[L] Glu 204 mg/dL[H] K+ 4.2 mmol/L Cr  4.26 mg/dL[H] BUN 67 mg/dL[H] 11-04 Phos 3.4 mg/dL 11-04 Alb 1.6 g/dL[L] 10-18 Chol --    LDL --    HDL --    Trig 231 mg/dL[H]11-04 ALT 17 U/L AST 23 U/L Alkaline Phosphatase 195 U/L[H]   CAPILLARY BLOOD GLUCOSE      POCT Blood Glucose.: 200 mg/dL (05 Nov 2024 11:00)  POCT Blood Glucose.: 190 mg/dL (05 Nov 2024 05:57)  POCT Blood Glucose.: 225 mg/dL (05 Nov 2024 01:16)  POCT Blood Glucose.: 174 mg/dL (04 Nov 2024 22:10)  POCT Blood Glucose.: 153 mg/dL (04 Nov 2024 17:13)    Skin:   Pressure ulcer x 1  1. 11/04, left buttock; stage II noted    Estimated Needs:   [x ] no change since previous assessment(10/06)  [ ] recalculated:       Previous New Nutrition Diagnosis: (10/22)  [x ] Malnutrition; severe malnutrition in context of acute illness   Related to: inadequate protein-energy intake in setting of acute respiratory failure, septic shock, pneumonia, pressure ulcer  As evidenced by: <50% nutrition needs > 5 days, >5% wt. loss in 1 month  Goal: pt to meet >75% protein-energy needs via enteral feeing; ? due to inaccurate I/O  Nutrition Diagnosis is [x ] ongoing  [ ] resolved [ ] not applicable       New Nutrition Diagnosis: [ x] not applicable     Interventions:   Recommend  [ ] Change Diet To:  [ ] Nutrition Supplement  [ x] Nutrition Support; Nepro @ goal rate 40 mL/hr x 24 hrs to ensure adequate delivery of feeding =960 ml, 1728 karla, 78 gm pro, 701 mL free water, 101% RDIs   [x] Liquid protein supplement 1 pkg=15 grams protein, 100 calories for a total of 1828 calories, 93 grams protein  [ x] Other: 100 ml water flush q 8 hours to meet free fluid needs of 1.8 karla/ml formula of Nepro    Monitoring and Evaluation:   [ ] PO intake [ x ] Tolerance to diet prescription [ x ] weights [ x ] labs[ x ] follow up per protocol  [x ] other: I/O

## 2024-11-05 NOTE — PROGRESS NOTE ADULT - ASSESSMENT
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 The patient was admitted w/ Septic shock POA due to RLL PNA with course complicated by acute hypoxic respiratory failure ultimately requiring admission to the ICU for intubation on 10/8. The pt failed trial of extubation and was subsequently reintubated on 10/14 & trached on 10/23. Pt was downgraded to the medical floors on 10/24.      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 Zerbaxa day #4. SW for discharge planning   (11/1) Abx for 7 days. Dispo pending.  (11/2) Zerbaxa day 6/7. Dispo as per .  F/U repeat blood cultures and sputum cultures. Clinically stable.  (11/3) sputum cultures with psudomonas. C/W Zerbaxa.  (11/4) Completed Abx. Dispo pending to [facility]  (11/5) Awaiting auth for facility disposition    Resp failure status post tracheostomy  - c/w mechanical ventilation  - Pulmonary follow     - c/w Duoneb and hypertonic saline   - continue aggressive pulm toileting modalities with chest PT  - Trach to vent; appears to tolerate SBT at times  - being done daily     Anemia  - c/w Epo and ferrous sulfate   - status post 1 unit pRBC 10/26  - check FOBT     Severe protein-calorie malnutrition and Underweight (BMI < 19)  - c/w PEG feeds     DM   - c/w ISS  (11/2) Increase lantus to 25 units qhs.    Constipation  - c/w Senna and Miralax stopped due to Diarrhea     ESRD  - c/w HD   - c/w Nephro-Kavon     Prophylaxis:  DVT: Heparin  GI: Protonix

## 2024-11-05 NOTE — PROGRESS NOTE ADULT - NS ATTEND AMEND GEN_ALL_CORE FT
73-year-old male with hypertension, type 2 diabetes, ESRD on hemodialysis, CVA with residual right-sided weakness and chronic dysphagia status post PEG and trach previous decannulated, right lower extremity DVT recently mated for acute hypoxic respiratory failure secondary to right lower lobe pneumonia due to aspiration.  Patient failed multiple attempts at extubation and tracheostomy was placed on October 23. Pulmonary consulted for continued ventilator management.    Plan  – Patient completed 7 day course of Zerbaxa for multidrug-resistant Pseudomonas aeruginosa's in the sputum  – Continues to be febrile; repeat trach culture from 11/2 growing Pseudomonas and Stenotrophomonas could be colonization    – Pressure support as tolerated  – Aggressive pulmonary toileting with chest PT and DuoNebs and hypertonic saline  – Tracheostomy care per routine with frequent suctioning  – HD per nephrology  – Continue to work with social work and case management on long-term placement  – Rest of care per primary team.

## 2024-11-05 NOTE — PROGRESS NOTE ADULT - ASSESSMENT
ESRD:  On HD TIW via an A-V fistula.  - HD on TTS schedule, via A-V fistula.   - Placement at [facility].  - Limit UF due to Hypotension.  - Midodrine as needed.      Anemia:  - Transfuse for Hg < 7.  - Continue EPO.    Respiratory Failure:  Failed extubation.  - s/p Tracheostomy.    Otf Mena MD  Nephrology

## 2024-11-05 NOTE — PROGRESS NOTE ADULT - SUBJECTIVE AND OBJECTIVE BOX
Patient is a 73y old  Male who presents with a chief complaint of Sepsis and acute hypoxic respiratory failure secondary to suspected aspiration PNA vs. HCAP (05 Nov 2024 11:47)    INTERVAL HPI/OVERNIGHT EVENTS: Patients seen and examined at bedside this morning. No acute events overnight.    MEDICATIONS  (STANDING):  albuterol/ipratropium for Nebulization 3 milliLiter(s) Nebulizer every 6 hours  chlorhexidine 0.12% Liquid 15 milliLiter(s) Oral Mucosa every 12 hours  chlorhexidine 2% Cloths 1 Application(s) Topical <User Schedule>  dextrose 5%. 1000 milliLiter(s) (100 mL/Hr) IV Continuous <Continuous>  dextrose 5%. 1000 milliLiter(s) (50 mL/Hr) IV Continuous <Continuous>  dextrose 50% Injectable 12.5 Gram(s) IV Push once  dextrose 50% Injectable 25 Gram(s) IV Push once  dextrose 50% Injectable 25 Gram(s) IV Push once  epoetin reilly-epbx (RETACRIT) Injectable 40052 Unit(s) IV Push <User Schedule>  ferrous    sulfate Liquid 300 milliGRAM(s) Enteral Tube daily  glucagon  Injectable 1 milliGRAM(s) IntraMuscular once  heparin   Injectable 5000 Unit(s) SubCutaneous every 12 hours  hydrALAZINE 50 milliGRAM(s) Oral every 8 hours  insulin glargine Injectable (LANTUS) 25 Unit(s) SubCutaneous at bedtime  insulin lispro (ADMELOG) corrective regimen sliding scale   SubCutaneous every 6 hours  lactobacillus acidophilus 1 Tablet(s) Oral daily  Nephro-noam 1 Tablet(s) Oral daily  nystatin Powder 1 Application(s) Topical every 8 hours  pantoprazole   Suspension 40 milliGRAM(s) Enteral Tube daily  sodium chloride 3%  Inhalation 4 milliLiter(s) Inhalation every 6 hours    MEDICATIONS  (PRN):  acetaminophen   Oral Liquid .. 650 milliGRAM(s) Oral every 6 hours PRN Temp greater or equal to 38.5C (101.3F)  dextrose Oral Gel 15 Gram(s) Oral once PRN Blood Glucose LESS THAN 70 milliGRAM(s)/deciliter    Allergies    No Known Allergies    Intolerances      REVIEW OF SYSTEMS:  All other systems reviewed and are negative    Vital Signs Last 24 Hrs  T(C): 36.7 (05 Nov 2024 13:35), Max: 37.7 (04 Nov 2024 16:00)  T(F): 98.1 (05 Nov 2024 13:35), Max: 99.8 (04 Nov 2024 16:00)  HR: 94 (05 Nov 2024 13:35) (86 - 101)  BP: 165/67 (05 Nov 2024 13:35) (146/65 - 165/67)  BP(mean): 95 (04 Nov 2024 23:34) (95 - 101)  RR: 21 (05 Nov 2024 13:35) (18 - 21)  SpO2: 96% (05 Nov 2024 13:35) (96% - 100%)    Parameters below as of 05 Nov 2024 13:35  Patient On (Oxygen Delivery Method): ventilator      Daily     Daily   I&O's Summary    04 Nov 2024 07:01  -  05 Nov 2024 07:00  --------------------------------------------------------  IN: 300 mL / OUT: 520 mL / NET: -220 mL    05 Nov 2024 07:01  -  05 Nov 2024 14:19  --------------------------------------------------------  IN: 200 mL / OUT: 200 mL / NET: 0 mL      CAPILLARY BLOOD GLUCOSE      POCT Blood Glucose.: 200 mg/dL (05 Nov 2024 11:00)  POCT Blood Glucose.: 190 mg/dL (05 Nov 2024 05:57)  POCT Blood Glucose.: 225 mg/dL (05 Nov 2024 01:16)  POCT Blood Glucose.: 174 mg/dL (04 Nov 2024 22:10)  POCT Blood Glucose.: 153 mg/dL (04 Nov 2024 17:13)    PHYSICAL EXAM:  HEENT: s/p tracheostomy  RESPIRATORY: Normal respiratory effort; no wheezing  CARDIOVASCULAR: Regular rate and rhythm, normal S1 and S2, no murmur/rub/gallop; No lower extremity edema  ABDOMEN: Nontender to palpation, normoactive bowel sounds, no rebound/guarding  MUSCLOSKELETAL: no clubbing or cyanosis of digits, dec ROM of b/l LE  PSYCH: A+O to person  NEURO: awake, able to follow commands      Labs                          8.5    15.89 )-----------( 417      ( 04 Nov 2024 06:52 )             26.7     11-04    131[L]  |  95[L]  |  67[H]  ----------------------------<  204[H]  4.2   |  23  |  4.26[H]    Ca    9.6      04 Nov 2024 06:52  Phos  3.4     11-04  Mg     2.4     11-04    TPro  7.2  /  Alb  1.6[L]  /  TBili  0.4  /  DBili  x   /  AST  23  /  ALT  17  /  AlkPhos  195[H]  11-04          Urinalysis Basic - ( 04 Nov 2024 06:52 )    Color: x / Appearance: x / SG: x / pH: x  Gluc: 204 mg/dL / Ketone: x  / Bili: x / Urobili: x   Blood: x / Protein: x / Nitrite: x   Leuk Esterase: x / RBC: x / WBC x   Sq Epi: x / Non Sq Epi: x / Bacteria: x                      Radiology and Imaging reviewed.

## 2024-11-05 NOTE — CHART NOTE - NSCHARTNOTESELECT_GEN_ALL_CORE
Nutrition Services
Off Service Note
Surgery
Event Note
Family discussion/Event Note
Nutrition Services
POCUS/Event Note
Transfer Note

## 2024-11-05 NOTE — PROGRESS NOTE ADULT - SUBJECTIVE AND OBJECTIVE BOX
Interval Events: Still having fevers. Leukocytosis up trending yesterday. Tolerating PS 15/5/35%     REVIEW OF SYSTEMS:  all negative unless listed within interval HPI     OBJECTIVE:  Vital Signs Last 24 Hrs  T(C): 36.7 (05 Nov 2024 10:34), Max: 39.1 (04 Nov 2024 13:03)  T(F): 98.1 (05 Nov 2024 10:34), Max: 102.3 (04 Nov 2024 13:03)  HR: 86 (05 Nov 2024 10:34) (86 - 119)  BP: 164/72 (05 Nov 2024 10:34) (146/65 - 169/72)  BP(mean): 95 (04 Nov 2024 23:34) (95 - 101)  ABP: --  ABP(mean): --  RR: 20 (05 Nov 2024 10:34) (18 - 20)  SpO2: 100% (05 Nov 2024 10:34) (95% - 100%)    O2 Parameters below as of 05 Nov 2024 01:02  Patient On (Oxygen Delivery Method): ventilator          Mode: CPAP with PS, FiO2: 35, PEEP: 5, MAP: 10, PIP: 21    11-04 @ 07:01  -  11-05 @ 07:00  --------------------------------------------------------  IN: 300 mL / OUT: 520 mL / NET: -220 mL      CAPILLARY BLOOD GLUCOSE      POCT Blood Glucose.: 190 mg/dL (05 Nov 2024 05:57)      PHYSICAL EXAM:  General: well, no distress   HEENT: Sclera clear, EOMI, PERRL  Neck: + trache   Respiratory: scattered rhonchi , white sputum via trach   Cardiovascular: S1S2 RRR  Abdomen: soft + BS , PEG , + diarrhea   Extremities: no edema, THAI       HOSPITAL MEDICATIONS:  heparin   Injectable 5000 Unit(s) SubCutaneous every 12 hours      hydrALAZINE 50 milliGRAM(s) Oral every 8 hours    dextrose 50% Injectable 12.5 Gram(s) IV Push once  dextrose 50% Injectable 25 Gram(s) IV Push once  dextrose 50% Injectable 25 Gram(s) IV Push once  dextrose Oral Gel 15 Gram(s) Oral once PRN  glucagon  Injectable 1 milliGRAM(s) IntraMuscular once  insulin glargine Injectable (LANTUS) 25 Unit(s) SubCutaneous at bedtime  insulin lispro (ADMELOG) corrective regimen sliding scale   SubCutaneous every 6 hours    albuterol/ipratropium for Nebulization 3 milliLiter(s) Nebulizer every 6 hours  sodium chloride 3%  Inhalation 4 milliLiter(s) Inhalation every 6 hours    acetaminophen   Oral Liquid .. 650 milliGRAM(s) Oral every 6 hours PRN    pantoprazole   Suspension 40 milliGRAM(s) Enteral Tube daily        dextrose 5%. 1000 milliLiter(s) IV Continuous <Continuous>  dextrose 5%. 1000 milliLiter(s) IV Continuous <Continuous>  ferrous    sulfate Liquid 300 milliGRAM(s) Enteral Tube daily  Nephro-noam 1 Tablet(s) Oral daily    epoetin reilly-epbx (RETACRIT) Injectable 18582 Unit(s) IV Push <User Schedule>    chlorhexidine 0.12% Liquid 15 milliLiter(s) Oral Mucosa every 12 hours  chlorhexidine 2% Cloths 1 Application(s) Topical <User Schedule>  nystatin Powder 1 Application(s) Topical every 8 hours    lactobacillus acidophilus 1 Tablet(s) Oral daily      LABS:                        8.5    15.89 )-----------( 417      ( 04 Nov 2024 06:52 )             26.7     Hgb Trend: 8.5<--, 9.4<--, 8.7<--, 8.7<--  11-04    131[L]  |  95[L]  |  67[H]  ----------------------------<  204[H]  4.2   |  23  |  4.26[H]    Ca    9.6      04 Nov 2024 06:52  Phos  3.4     11-04  Mg     2.4     11-04    TPro  7.2  /  Alb  1.6[L]  /  TBili  0.4  /  DBili  x   /  AST  23  /  ALT  17  /  AlkPhos  195[H]  11-04    Creatinine Trend: 4.26<--, 3.21<--, 3.80<--, 4.46<--, 3.26<--, 5.08<--    Urinalysis Basic - ( 04 Nov 2024 06:52 )    Color: x / Appearance: x / SG: x / pH: x  Gluc: 204 mg/dL / Ketone: x  / Bili: x / Urobili: x   Blood: x / Protein: x / Nitrite: x   Leuk Esterase: x / RBC: x / WBC x   Sq Epi: x / Non Sq Epi: x / Bacteria: x            MICROBIOLOGY:     Culture Results:   Moderate Pseudomonas aeruginosa  Commensal dominick consistent with body site (11-02-24 @ 06:50)  Culture Results:   No growth at 48 Hours (11-01-24 @ 19:05)  Culture Results:   No growth at 5 days (10-28-24 @ 15:00)  Culture Results:   No growth at 5 days (10-28-24 @ 15:00)  Culture Results:   No acid-fast bacilli isolated at 1 week. ****Acid-fast cultures are held  for 6 weeks.**** (10-25-24 @ 18:35)  Culture Results:   Moderate Pseudomonas aeruginosa (Carbapenem Resistant)  Commensal dominick consistent with body site (10-25-24 @ 17:30)  Culture Results:   No growth at 5 days (10-25-24 @ 16:55)  Culture Results:   No growth at 5 days (10-25-24 @ 16:55)  Culture Results:   No growth at 5 days (10-18-24 @ 11:10)  Culture Results:   No acid-fast bacilli isolated at 2 weeks. (10-14-24 @ 11:20)  Culture Results:   Few Pseudomonas aeruginosa  Commensal dominick consistent with body site (10-14-24 @ 11:20)      RADIOLOGY:  [ x] Reviewed

## 2024-11-05 NOTE — PROGRESS NOTE ADULT - SUBJECTIVE AND OBJECTIVE BOX
ROGELIOJAYSON JIMMY  73y  Patient is a 73y old  Male who presents with a chief complaint of Sepsis and acute hypoxic respiratory failure secondary to suspected aspiration PNA vs. HCAP (05 Nov 2024 10:40)    HPI:  Seen and examined. ESRD on HD with tracheostomy.     HEALTH ISSUES - PROBLEM Dx:  Sepsis due to pneumonia    Acute respiratory failure with hypoxia    Hypertensive urgency    Constipation    Lactic acidosis    Insulin dependent type 2 diabetes mellitus    ESRD on hemodialysis    History of cerebrovascular accident (CVA) with residual deficit    History of DVT of lower extremity    Microcytic anemia    Hypoxia    Severe protein-calorie malnutrition    Encounter for palliative care          MEDICATIONS  (STANDING):  albuterol/ipratropium for Nebulization 3 milliLiter(s) Nebulizer every 6 hours  chlorhexidine 0.12% Liquid 15 milliLiter(s) Oral Mucosa every 12 hours  chlorhexidine 2% Cloths 1 Application(s) Topical <User Schedule>  dextrose 5%. 1000 milliLiter(s) (50 mL/Hr) IV Continuous <Continuous>  dextrose 5%. 1000 milliLiter(s) (100 mL/Hr) IV Continuous <Continuous>  dextrose 50% Injectable 12.5 Gram(s) IV Push once  dextrose 50% Injectable 25 Gram(s) IV Push once  dextrose 50% Injectable 25 Gram(s) IV Push once  epoetin reilly-epbx (RETACRIT) Injectable 81873 Unit(s) IV Push <User Schedule>  ferrous    sulfate Liquid 300 milliGRAM(s) Enteral Tube daily  glucagon  Injectable 1 milliGRAM(s) IntraMuscular once  heparin   Injectable 5000 Unit(s) SubCutaneous every 12 hours  hydrALAZINE 50 milliGRAM(s) Oral every 8 hours  insulin glargine Injectable (LANTUS) 25 Unit(s) SubCutaneous at bedtime  insulin lispro (ADMELOG) corrective regimen sliding scale   SubCutaneous every 6 hours  lactobacillus acidophilus 1 Tablet(s) Oral daily  Nephro-noam 1 Tablet(s) Oral daily  nystatin Powder 1 Application(s) Topical every 8 hours  pantoprazole   Suspension 40 milliGRAM(s) Enteral Tube daily  sodium chloride 3%  Inhalation 4 milliLiter(s) Inhalation every 6 hours    MEDICATIONS  (PRN):  acetaminophen   Oral Liquid .. 650 milliGRAM(s) Oral every 6 hours PRN Temp greater or equal to 38.5C (101.3F)  dextrose Oral Gel 15 Gram(s) Oral once PRN Blood Glucose LESS THAN 70 milliGRAM(s)/deciliter    Vital Signs Last 24 Hrs  T(C): 36.7 (05 Nov 2024 10:34), Max: 39.1 (04 Nov 2024 13:03)  T(F): 98.1 (05 Nov 2024 10:34), Max: 102.3 (04 Nov 2024 13:03)  HR: 86 (05 Nov 2024 10:34) (86 - 119)  BP: 164/72 (05 Nov 2024 10:34) (146/65 - 169/72)  BP(mean): 95 (04 Nov 2024 23:34) (95 - 101)  RR: 20 (05 Nov 2024 10:34) (18 - 20)  SpO2: 100% (05 Nov 2024 10:34) (95% - 100%)    Parameters below as of 05 Nov 2024 10:34  Patient On (Oxygen Delivery Method): ventilator      Daily     Daily     PHYSICAL EXAM:  Constitutional:   appears comfortable and not distressed. Not diaphoretic.    Neck:  The thyroid is normal. Tracheostomy.     Respiratory: The lungs are clear to auscultation. No dullness and expansion is normal.    Cardiovascular: S1 and S2 are normal. No murmurs rubs or gallops are present.    Gastrointestinal: The abdomen is soft. No tenderness is present. No masses are present. Bowel sounds are normal.    Genitourinary: The bladder is not distended. No CVA tenderness is present.    Extremities: No edema is noted. No deformities are present.    Neurological: Cognition is normal. Tone, power and sensation are normal. Gait is steady.    Skin: No leasions are seen  or palpated.    Lymph Nodes: No lymphadenopathy is present.    Psychiatric: Mood is appropriate. No hallucinations or flight of ideas are noted.                              8.5    15.89 )-----------( 417      ( 04 Nov 2024 06:52 )             26.7     11-04    131[L]  |  95[L]  |  67[H]  ----------------------------<  204[H]  4.2   |  23  |  4.26[H]    Ca    9.6      04 Nov 2024 06:52  Phos  3.4     11-04  Mg     2.4     11-04    TPro  7.2  /  Alb  1.6[L]  /  TBili  0.4  /  DBili  x   /  AST  23  /  ALT  17  /  AlkPhos  195[H]  11-04    Urinalysis Basic - ( 04 Nov 2024 06:52 )    Color: x / Appearance: x / SG: x / pH: x  Gluc: 204 mg/dL / Ketone: x  / Bili: x / Urobili: x   Blood: x / Protein: x / Nitrite: x   Leuk Esterase: x / RBC: x / WBC x   Sq Epi: x / Non Sq Epi: x / Bacteria: x

## 2024-11-05 NOTE — PROGRESS NOTE ADULT - ASSESSMENT
74 yo M with h/o HTN, DM2, ESRD on HD (MWF, RUE AV Fistula), CVA with residual R sided weakness and dysphagia s/p peg, s/p trach since decannulated, RLE DVT (completed course of Eliquis) admitted with 1) RLL PNA with course complicated by acute hypoxic respiratory failure ultimately requiring intubation, failed trial of extubation and was subsequently reintubated 10/14 and tracheostomy on 10/14. 2) Septic shock 2 to PNA    Recs:    - febrile again yesterday , WBC trending up from yesterdays labs  - Patient admitted w/ Septic shock POA due to RLL PNA with course complicated by acute hypoxic respiratory failure ultimately requiring admission to the ICU for intubation on 10/8  - Failed trial of extubation and was subsequently reintubated on 10/14  - Tracheostomy placed by surgery on 10/23; has a chronic PEG tube already   - will ask surgery to remove sutures for trach   - He is s/p bronch x 2. BAL with PsAg and Stenotrophomonas & bronch culture with Pseudomonas aeruginosa on 10/8 & 10/14  - thick whites secretions with suctioning   - completed course of zosyn and levaquin.   - Completed course zerbaxa  for carbapenem resistant pseudomonas PNA, antibx per ID. f/u reccs  - continue aggressive pulm toileting modalities with chest PT, duonebs and hypersal   - Trach to vent; appears to tolerate SBT at times with higher settings like 12/5  will continue to attempt daily, but may need to wait for infection to improve to wean better  - maintain on duonebs and Hypersal with thick secretions from infection   - HD per nephrology   - rest of care per primary team     Plan discussed with pulm attending Dr. Man

## 2024-11-06 LAB
-  LEVOFLOXACIN: SIGNIFICANT CHANGE UP
-  MINOCYCLINE: SIGNIFICANT CHANGE UP
-  TRIMETHOPRIM/SULFAMETHOXAZOLE: SIGNIFICANT CHANGE UP
ANION GAP SERPL CALC-SCNC: 10 MMOL/L — SIGNIFICANT CHANGE UP (ref 5–17)
BUN SERPL-MCNC: 57 MG/DL — HIGH (ref 7–23)
CALCIUM SERPL-MCNC: 9.3 MG/DL — SIGNIFICANT CHANGE UP (ref 8.5–10.1)
CHLORIDE SERPL-SCNC: 97 MMOL/L — SIGNIFICANT CHANGE UP (ref 96–108)
CO2 SERPL-SCNC: 24 MMOL/L — SIGNIFICANT CHANGE UP (ref 22–31)
CREAT SERPL-MCNC: 3.72 MG/DL — HIGH (ref 0.5–1.3)
CULTURE RESULTS: SIGNIFICANT CHANGE UP
EGFR: 17 ML/MIN/1.73M2 — LOW
GLUCOSE BLDC GLUCOMTR-MCNC: 178 MG/DL — HIGH (ref 70–99)
GLUCOSE BLDC GLUCOMTR-MCNC: 192 MG/DL — HIGH (ref 70–99)
GLUCOSE BLDC GLUCOMTR-MCNC: 193 MG/DL — HIGH (ref 70–99)
GLUCOSE BLDC GLUCOMTR-MCNC: 200 MG/DL — HIGH (ref 70–99)
GLUCOSE BLDC GLUCOMTR-MCNC: 201 MG/DL — HIGH (ref 70–99)
GLUCOSE BLDC GLUCOMTR-MCNC: 210 MG/DL — HIGH (ref 70–99)
GLUCOSE BLDC GLUCOMTR-MCNC: 213 MG/DL — HIGH (ref 70–99)
GLUCOSE BLDC GLUCOMTR-MCNC: 233 MG/DL — HIGH (ref 70–99)
GLUCOSE BLDC GLUCOMTR-MCNC: 268 MG/DL — HIGH (ref 70–99)
GLUCOSE BLDC GLUCOMTR-MCNC: 269 MG/DL — HIGH (ref 70–99)
GLUCOSE SERPL-MCNC: 181 MG/DL — HIGH (ref 70–99)
HCT VFR BLD CALC: 27.8 % — LOW (ref 39–50)
HGB BLD-MCNC: 8.7 G/DL — LOW (ref 13–17)
MCHC RBC-ENTMCNC: 23.6 PG — LOW (ref 27–34)
MCHC RBC-ENTMCNC: 31.3 G/DL — LOW (ref 32–36)
MCV RBC AUTO: 75.5 FL — LOW (ref 80–100)
METHOD TYPE: SIGNIFICANT CHANGE UP
METHOD TYPE: SIGNIFICANT CHANGE UP
NRBC # BLD: 0 /100 WBCS — SIGNIFICANT CHANGE UP (ref 0–0)
PLATELET # BLD AUTO: 458 K/UL — HIGH (ref 150–400)
POTASSIUM SERPL-MCNC: 3.7 MMOL/L — SIGNIFICANT CHANGE UP (ref 3.5–5.3)
POTASSIUM SERPL-SCNC: 3.7 MMOL/L — SIGNIFICANT CHANGE UP (ref 3.5–5.3)
RBC # BLD: 3.68 M/UL — LOW (ref 4.2–5.8)
RBC # FLD: 21.4 % — HIGH (ref 10.3–14.5)
SODIUM SERPL-SCNC: 131 MMOL/L — LOW (ref 135–145)
SPECIMEN SOURCE: SIGNIFICANT CHANGE UP
WBC # BLD: 14.42 K/UL — HIGH (ref 3.8–10.5)
WBC # FLD AUTO: 14.42 K/UL — HIGH (ref 3.8–10.5)

## 2024-11-06 PROCEDURE — 99232 SBSQ HOSP IP/OBS MODERATE 35: CPT

## 2024-11-06 PROCEDURE — 99233 SBSQ HOSP IP/OBS HIGH 50: CPT

## 2024-11-06 RX ORDER — ACETAMINOPHEN 500 MG
650 TABLET ORAL EVERY 6 HOURS
Refills: 0 | Status: DISCONTINUED | OUTPATIENT
Start: 2024-11-06 | End: 2024-11-07

## 2024-11-06 RX ADMIN — Medication 6: at 01:49

## 2024-11-06 RX ADMIN — IPRATROPIUM BROMIDE AND ALBUTEROL SULFATE 3 MILLILITER(S): .5; 2.5 SOLUTION RESPIRATORY (INHALATION) at 05:09

## 2024-11-06 RX ADMIN — IPRATROPIUM BROMIDE AND ALBUTEROL SULFATE 3 MILLILITER(S): .5; 2.5 SOLUTION RESPIRATORY (INHALATION) at 23:51

## 2024-11-06 RX ADMIN — Medication 25 UNIT(S): at 21:42

## 2024-11-06 RX ADMIN — IPRATROPIUM BROMIDE AND ALBUTEROL SULFATE 3 MILLILITER(S): .5; 2.5 SOLUTION RESPIRATORY (INHALATION) at 11:49

## 2024-11-06 RX ADMIN — PANTOPRAZOLE SODIUM 40 MILLIGRAM(S): 40 TABLET, DELAYED RELEASE ORAL at 14:39

## 2024-11-06 RX ADMIN — HEPARIN SODIUM 5000 UNIT(S): 10000 INJECTION INTRAVENOUS; SUBCUTANEOUS at 06:15

## 2024-11-06 RX ADMIN — Medication 1 TABLET(S): at 12:42

## 2024-11-06 RX ADMIN — Medication 4: at 17:34

## 2024-11-06 RX ADMIN — CHLORHEXIDINE GLUCONATE 15 MILLILITER(S): 40 SOLUTION TOPICAL at 06:13

## 2024-11-06 RX ADMIN — IPRATROPIUM BROMIDE AND ALBUTEROL SULFATE 3 MILLILITER(S): .5; 2.5 SOLUTION RESPIRATORY (INHALATION) at 17:14

## 2024-11-06 RX ADMIN — SODIUM CHLORIDE 4 MILLILITER(S): 9 INJECTION, SOLUTION INTRAMUSCULAR; INTRAVENOUS; SUBCUTANEOUS at 17:14

## 2024-11-06 RX ADMIN — IPRATROPIUM BROMIDE AND ALBUTEROL SULFATE 3 MILLILITER(S): .5; 2.5 SOLUTION RESPIRATORY (INHALATION) at 00:05

## 2024-11-06 RX ADMIN — SODIUM CHLORIDE 4 MILLILITER(S): 9 INJECTION, SOLUTION INTRAMUSCULAR; INTRAVENOUS; SUBCUTANEOUS at 05:09

## 2024-11-06 RX ADMIN — SODIUM CHLORIDE 4 MILLILITER(S): 9 INJECTION, SOLUTION INTRAMUSCULAR; INTRAVENOUS; SUBCUTANEOUS at 00:06

## 2024-11-06 RX ADMIN — Medication 650 MILLIGRAM(S): at 18:50

## 2024-11-06 RX ADMIN — CHLORHEXIDINE GLUCONATE 15 MILLILITER(S): 40 SOLUTION TOPICAL at 18:49

## 2024-11-06 RX ADMIN — Medication 300 MILLIGRAM(S): at 12:41

## 2024-11-06 RX ADMIN — Medication 2: at 06:16

## 2024-11-06 RX ADMIN — HYDRALAZINE HYDROCHLORIDE 50 MILLIGRAM(S): 50 TABLET, FILM COATED ORAL at 21:42

## 2024-11-06 RX ADMIN — HEPARIN SODIUM 5000 UNIT(S): 10000 INJECTION INTRAVENOUS; SUBCUTANEOUS at 17:35

## 2024-11-06 RX ADMIN — CHLORHEXIDINE GLUCONATE 1 APPLICATION(S): 40 SOLUTION TOPICAL at 06:18

## 2024-11-06 RX ADMIN — Medication 4: at 12:30

## 2024-11-06 RX ADMIN — SODIUM CHLORIDE 4 MILLILITER(S): 9 INJECTION, SOLUTION INTRAMUSCULAR; INTRAVENOUS; SUBCUTANEOUS at 23:52

## 2024-11-06 RX ADMIN — SODIUM CHLORIDE 4 MILLILITER(S): 9 INJECTION, SOLUTION INTRAMUSCULAR; INTRAVENOUS; SUBCUTANEOUS at 11:49

## 2024-11-06 RX ADMIN — HYDRALAZINE HYDROCHLORIDE 50 MILLIGRAM(S): 50 TABLET, FILM COATED ORAL at 06:14

## 2024-11-06 RX ADMIN — NYSTATIN 1 APPLICATION(S): 100000 POWDER TOPICAL at 14:39

## 2024-11-06 RX ADMIN — HYDRALAZINE HYDROCHLORIDE 50 MILLIGRAM(S): 50 TABLET, FILM COATED ORAL at 14:30

## 2024-11-06 RX ADMIN — NYSTATIN 1 APPLICATION(S): 100000 POWDER TOPICAL at 21:51

## 2024-11-06 RX ADMIN — Medication 1 TABLET(S): at 12:41

## 2024-11-06 RX ADMIN — NYSTATIN 1 APPLICATION(S): 100000 POWDER TOPICAL at 06:16

## 2024-11-06 NOTE — PROGRESS NOTE ADULT - NS ATTEND AMEND GEN_ALL_CORE FT
73-year-old male with hypertension, type 2 diabetes, ESRD on hemodialysis, CVA with residual right-sided weakness and chronic dysphagia status post PEG and trach previous decannulated, right lower extremity DVT recently mated for acute hypoxic respiratory failure secondary to right lower lobe pneumonia due to aspiration.  Patient failed multiple attempts at extubation and tracheostomy was placed on October 23. Pulmonary consulted for continued ventilator management.    Plan  – Patient completed 7 day course of Zerbaxa for multidrug-resistant Pseudomonas aeruginosa's in the sputum  – Repeat trach culture from 11/2 growing Pseudomonas and Stenotrophomonas could be colonization    – Patient continues to be febrile   – Pressure support as tolerated  – Aggressive pulmonary toileting with chest PT and DuoNebs and hypertonic saline  – Tracheostomy care per routine with frequent suctioning  – HD per nephrology  – Continue to work with social work and case management on long-term placement  – Rest of care per primary team.

## 2024-11-06 NOTE — PROGRESS NOTE ADULT - ASSESSMENT
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 The patient was admitted w/ Septic shock POA due to RLL PNA with course complicated by acute hypoxic respiratory failure ultimately requiring admission to the ICU for intubation on 10/8. The pt failed trial of extubation and was subsequently reintubated on 10/14 & trached on 10/23. Pt was downgraded to the medical floors on 10/24.      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 Zerbaxa day #4. SW for discharge planning   (11/1) Abx for 7 days. Dispo pending.  (11/2) Zerbaxa day 6/7. Dispo as per .  F/U repeat blood cultures and sputum cultures. Clinically stable.  (11/3) sputum cultures with psudomonas. C/W Zerbaxa.  (11/4) Completed Abx. Dispo pending to [facility]  (11/5) Awaiting auth for facility disposition  (11/6)- Patient accepted to [facility], but family interested in [facility]    Resp failure status post tracheostomy  - c/w mechanical ventilation  - Pulmonary follow     - c/w Duoneb and hypertonic saline   - continue aggressive pulm toileting modalities with chest PT  - Trach to vent; appears to tolerate SBT at times  - being done daily     Anemia  - c/w Epo and ferrous sulfate   - status post 1 unit pRBC 10/26  - check FOBT     Severe protein-calorie malnutrition and Underweight (BMI < 19)  - c/w PEG feeds     DM   - c/w ISS  (11/2) Increase lantus to 25 units qhs.    Constipation  - c/w Senna and Miralax stopped due to Diarrhea     ESRD  - c/w HD   - c/w Nephro-Kavon     Prophylaxis:  DVT: Heparin  GI: Protonix

## 2024-11-06 NOTE — PROGRESS NOTE ADULT - SUBJECTIVE AND OBJECTIVE BOX
Patient is a 73y old  Male who presents with a chief complaint of Sepsis and acute hypoxic respiratory failure secondary to suspected aspiration PNA vs. HCAP (06 Nov 2024 08:51)    INTERVAL HPI/OVERNIGHT EVENTS: No acute events overnight. HD stable.     MEDICATIONS  (STANDING):  albuterol/ipratropium for Nebulization 3 milliLiter(s) Nebulizer every 6 hours  chlorhexidine 0.12% Liquid 15 milliLiter(s) Oral Mucosa every 12 hours  chlorhexidine 2% Cloths 1 Application(s) Topical <User Schedule>  dextrose 5%. 1000 milliLiter(s) (50 mL/Hr) IV Continuous <Continuous>  dextrose 5%. 1000 milliLiter(s) (100 mL/Hr) IV Continuous <Continuous>  dextrose 50% Injectable 12.5 Gram(s) IV Push once  dextrose 50% Injectable 25 Gram(s) IV Push once  dextrose 50% Injectable 25 Gram(s) IV Push once  epoetin reilly-epbx (RETACRIT) Injectable 72574 Unit(s) IV Push <User Schedule>  ferrous    sulfate Liquid 300 milliGRAM(s) Enteral Tube daily  glucagon  Injectable 1 milliGRAM(s) IntraMuscular once  heparin   Injectable 5000 Unit(s) SubCutaneous every 12 hours  hydrALAZINE 50 milliGRAM(s) Oral every 8 hours  insulin glargine Injectable (LANTUS) 25 Unit(s) SubCutaneous at bedtime  insulin lispro (ADMELOG) corrective regimen sliding scale   SubCutaneous every 6 hours  lactobacillus acidophilus 1 Tablet(s) Oral daily  Nephro-noam 1 Tablet(s) Oral daily  nystatin Powder 1 Application(s) Topical every 8 hours  pantoprazole   Suspension 40 milliGRAM(s) Enteral Tube daily  sodium chloride 3%  Inhalation 4 milliLiter(s) Inhalation every 6 hours    MEDICATIONS  (PRN):  acetaminophen   Oral Liquid .. 650 milliGRAM(s) Oral every 6 hours PRN Temp greater or equal to 38.5C (101.3F)  dextrose Oral Gel 15 Gram(s) Oral once PRN Blood Glucose LESS THAN 70 milliGRAM(s)/deciliter      Allergies    No Known Allergies    Intolerances        REVIEW OF SYSTEMS: all negative with exception of above    Vital Signs Last 24 Hrs  T(C): 37.9 (06 Nov 2024 10:10), Max: 37.9 (06 Nov 2024 10:10)  T(F): 100.2 (06 Nov 2024 10:10), Max: 100.2 (06 Nov 2024 10:10)  HR: 107 (06 Nov 2024 12:11) (87 - 108)  BP: 136/61 (06 Nov 2024 10:10) (114/62 - 150/68)  BP(mean): --  RR: 19 (06 Nov 2024 04:36) (18 - 19)  SpO2: 97% (06 Nov 2024 12:11) (94% - 100%)    Parameters below as of 06 Nov 2024 12:11  Patient On (Oxygen Delivery Method): ventilator        PHYSICAL EXAM:  GENERAL: ill-appearing  HEENT: s/p tracheostomy  RESPIRATORY: Normal respiratory effort; no wheezing  CARDIOVASCULAR: Regular rate and rhythm, normal S1 and S2, no murmur/rub/gallop; No lower extremity edema  ABDOMEN: Nontender to palpation, normoactive bowel sounds, no rebound/guarding  MUSCLOSKELETAL: no clubbing or cyanosis of digits, dec ROM of b/l LE  PSYCH: A+O to person  NEURO: awake, able to follow commands    LABS:                        8.7    14.42 )-----------( 458      ( 06 Nov 2024 07:15 )             27.8     11-06    131[L]  |  97  |  57[H]  ----------------------------<  181[H]  3.7   |  24  |  3.72[H]    Ca    9.3      06 Nov 2024 07:15        Urinalysis Basic - ( 06 Nov 2024 07:15 )    Color: x / Appearance: x / SG: x / pH: x  Gluc: 181 mg/dL / Ketone: x  / Bili: x / Urobili: x   Blood: x / Protein: x / Nitrite: x   Leuk Esterase: x / RBC: x / WBC x   Sq Epi: x / Non Sq Epi: x / Bacteria: x      CAPILLARY BLOOD GLUCOSE      POCT Blood Glucose.: 233 mg/dL (06 Nov 2024 12:28)  POCT Blood Glucose.: 213 mg/dL (06 Nov 2024 10:36)  POCT Blood Glucose.: 201 mg/dL (06 Nov 2024 07:22)  POCT Blood Glucose.: 192 mg/dL (06 Nov 2024 06:15)  POCT Blood Glucose.: 193 mg/dL (06 Nov 2024 05:00)  POCT Blood Glucose.: 268 mg/dL (06 Nov 2024 01:48)  POCT Blood Glucose.: 269 mg/dL (06 Nov 2024 00:09)  POCT Blood Glucose.: 257 mg/dL (05 Nov 2024 21:19)  POCT Blood Glucose.: 140 mg/dL (05 Nov 2024 16:31)      RADIOLOGY & ADDITIONAL TESTS:    Imaging Personally Reviewed:  [ ] YES  [ ] NO    Consultant(s) Notes Reviewed:  [ ] YES  [ ] NO    Care Discussed with Consultants/Other Providers [ ] YES  [ ] NO

## 2024-11-06 NOTE — PROGRESS NOTE ADULT - ASSESSMENT
72 yo M with h/o HTN, DM2, ESRD on HD (MWF, RUE AV Fistula), CVA with residual R sided weakness and dysphagia s/p peg, s/p trach since decannulated, RLE DVT (completed course of Eliquis) admitted with 1) RLL PNA with course complicated by acute hypoxic respiratory failure ultimately requiring intubation, failed trial of extubation and was subsequently reintubated 10/14 and tracheostomy on 10/14. 2) Septic shock 2 to PNA    Recs:   72 yo M with h/o HTN, DM2, ESRD on HD (MWF, RUE AV Fistula), CVA with residual R sided weakness and dysphagia s/p peg, s/p trach since decannulated, RLE DVT (completed course of Eliquis) admitted with 1) RLL PNA with course complicated by acute hypoxic respiratory failure ultimately requiring intubation, failed trial of extubation and was subsequently reintubated 10/14 and tracheostomy on 10/14. 2) Septic shock 2 to PNA    Reccs:    72 yo M with h/o HTN, DM2, ESRD on HD (MWF, RUE AV Fistula), CVA with residual R sided weakness and dysphagia s/p peg, s/p trach since decannulated, RLE DVT (completed course of Eliquis) admitted with 1) RLL PNA with course complicated by acute hypoxic respiratory failure ultimately requiring intubation, failed trial of extubation and was subsequently reintubated 10/14 and tracheostomy on 10/14. 2) Septic shock 2 to PNA    Recs:    - Afebrile yesterday, WBC stable  - Patient admitted w/ Septic shock POA due to RLL PNA with course complicated by acute hypoxic respiratory failure ultimately requiring admission to the ICU for intubation on 10/8  - Failed trial of extubation and was subsequently reintubated on 10/14  - Tracheostomy placed by surgery on 10/23; has a chronic PEG tube already   - will ask surgery to remove sutures for trach   - He is s/p bronch x 2. BAL with PsAg and Stenotrophomonas & bronch culture with Pseudomonas aeruginosa on 10/8 & 10/14  - thick whites secretions with suctioning   - completed course of zosyn and levaquin.   - Completed course zerbaxa  for carbapenem resistant pseudomonas PNA, antibx per ID. f/u reccs  - continue aggressive pulm toileting modalities with chest PT, duonebs and hypersal   - Trach to vent; appears to tolerate SBT at times with higher settings like 12/5  will continue to attempt daily, but may need to wait for infection to improve to wean better  - maintain on duonebs and Hypersal with thick secretions from infection   - HD per nephrology   - rest of care per primary team     Plan discussed with pulm attending Dr. Man

## 2024-11-06 NOTE — PROGRESS NOTE ADULT - SUBJECTIVE AND OBJECTIVE BOX
ROGELIOJAYSON JIMMY  73y  Patient is a 73y old  Male who presents with a chief complaint of Sepsis and acute hypoxic respiratory failure secondary to suspected aspiration PNA vs. HCAP (06 Nov 2024 14:42)    HPI:  ESRD on HD.    HEALTH ISSUES - PROBLEM Dx:  Sepsis due to pneumonia    Acute respiratory failure with hypoxia    Hypertensive urgency    Constipation    Lactic acidosis    Insulin dependent type 2 diabetes mellitus    ESRD on hemodialysis    History of cerebrovascular accident (CVA) with residual deficit    History of DVT of lower extremity    Microcytic anemia    Hypoxia    Severe protein-calorie malnutrition    Encounter for palliative care          MEDICATIONS  (STANDING):  albuterol/ipratropium for Nebulization 3 milliLiter(s) Nebulizer every 6 hours  chlorhexidine 0.12% Liquid 15 milliLiter(s) Oral Mucosa every 12 hours  chlorhexidine 2% Cloths 1 Application(s) Topical <User Schedule>  dextrose 5%. 1000 milliLiter(s) (50 mL/Hr) IV Continuous <Continuous>  dextrose 5%. 1000 milliLiter(s) (100 mL/Hr) IV Continuous <Continuous>  dextrose 50% Injectable 12.5 Gram(s) IV Push once  dextrose 50% Injectable 25 Gram(s) IV Push once  dextrose 50% Injectable 25 Gram(s) IV Push once  epoetin reilly-epbx (RETACRIT) Injectable 87294 Unit(s) IV Push <User Schedule>  ferrous    sulfate Liquid 300 milliGRAM(s) Enteral Tube daily  glucagon  Injectable 1 milliGRAM(s) IntraMuscular once  heparin   Injectable 5000 Unit(s) SubCutaneous every 12 hours  hydrALAZINE 50 milliGRAM(s) Oral every 8 hours  insulin glargine Injectable (LANTUS) 25 Unit(s) SubCutaneous at bedtime  insulin lispro (ADMELOG) corrective regimen sliding scale   SubCutaneous every 6 hours  lactobacillus acidophilus 1 Tablet(s) Oral daily  Nephro-noam 1 Tablet(s) Oral daily  nystatin Powder 1 Application(s) Topical every 8 hours  pantoprazole   Suspension 40 milliGRAM(s) Enteral Tube daily  sodium chloride 3%  Inhalation 4 milliLiter(s) Inhalation every 6 hours    MEDICATIONS  (PRN):  acetaminophen   Oral Liquid .. 650 milliGRAM(s) Oral every 6 hours PRN Temp greater or equal to 38.5C (101.3F)  dextrose Oral Gel 15 Gram(s) Oral once PRN Blood Glucose LESS THAN 70 milliGRAM(s)/deciliter    Vital Signs Last 24 Hrs  T(C): 37.9 (06 Nov 2024 10:10), Max: 37.9 (06 Nov 2024 10:10)  T(F): 100.2 (06 Nov 2024 10:10), Max: 100.2 (06 Nov 2024 10:10)  HR: 107 (06 Nov 2024 12:11) (87 - 108)  BP: 136/61 (06 Nov 2024 10:10) (114/62 - 150/68)  BP(mean): --  RR: 19 (06 Nov 2024 04:36) (18 - 19)  SpO2: 97% (06 Nov 2024 12:11) (94% - 100%)    Parameters below as of 06 Nov 2024 12:11  Patient On (Oxygen Delivery Method): ventilator      Daily     Daily     PHYSICAL EXAM:  Constitutional:  He appears comfortable and not distressed. Not diaphoretic.    Neck:  The thyroid is normal. Tracheostomy.    Respiratory: The lungs are clear to auscultation. No dullness and expansion is normal.    Cardiovascular: S1 and S2 are normal. No murmurs, rubs or gallops are present.    Gastrointestinal: The abdomen is soft. No tenderness is present.     Genitourinary: The bladder is not distended. No CVA tenderness is present.    Extremities: No edema is noted. No deformities are present.    Neurological: Tone, power and sensation are normal.     Skin: No lesions are seen  or palpated.    Lymph Nodes: No lymphadenopathy is present.                              8.7    14.42 )-----------( 458      ( 06 Nov 2024 07:15 )             27.8     11-06    131[L]  |  97  |  57[H]  ----------------------------<  181[H]  3.7   |  24  |  3.72[H]    Ca    9.3      06 Nov 2024 07:15

## 2024-11-06 NOTE — PROGRESS NOTE ADULT - ASSESSMENT
ESRD:  On HD TIW via an A-V fistula.  - HD on TTS schedule, via A-V fistula.   - Placement at [facility].  - Limit UF due to Hypotension.  - Midodrine as needed.      Anemia:  - Transfuse for Hg < 7.  - Continue EPO.    Respiratory Failure:  Failed extubation.  - s/p Tracheostomy.    Otf Mena MD  Nephrology

## 2024-11-06 NOTE — PROGRESS NOTE ADULT - SUBJECTIVE AND OBJECTIVE BOX
Interval Events:    REVIEW OF SYSTEMS:  All negative unless listed within interval HPI     OBJECTIVE:  Vital Signs Last 24 Hrs  T(C): 36.8 (06 Nov 2024 04:36), Max: 36.9 (06 Nov 2024 00:06)  T(F): 98.2 (06 Nov 2024 04:36), Max: 98.4 (06 Nov 2024 00:06)  HR: 90 (06 Nov 2024 05:25) (86 - 105)  BP: 150/68 (06 Nov 2024 04:36) (114/62 - 165/67)  BP(mean): --  ABP: --  ABP(mean): --  RR: 19 (06 Nov 2024 04:36) (18 - 21)  SpO2: 99% (06 Nov 2024 05:25) (94% - 100%)    O2 Parameters below as of 06 Nov 2024 01:01  Patient On (Oxygen Delivery Method): ventilator          Mode: AC/ CMV (Assist Control/ Continuous Mandatory Ventilation), RR (machine): 16, TV (machine): 400, FiO2: 35, PEEP: 5, ITime: 0.9, MAP: 9, PIP: 23    11-05 @ 07:01  -  11-06 @ 07:00  --------------------------------------------------------  IN: 1340 mL / OUT: 2800 mL / NET: -1460 mL      CAPILLARY BLOOD GLUCOSE      POCT Blood Glucose.: 201 mg/dL (06 Nov 2024 07:22)      PHYSICAL EXAM:  General:   HEENT:   Lymph Nodes:  Neck:   Respiratory:   Cardiovascular:   Abdomen:   Extremities:   Skin:   Neurological:  Psychiatry:    HOSPITAL MEDICATIONS:  heparin   Injectable 5000 Unit(s) SubCutaneous every 12 hours      hydrALAZINE 50 milliGRAM(s) Oral every 8 hours    dextrose 50% Injectable 12.5 Gram(s) IV Push once  dextrose 50% Injectable 25 Gram(s) IV Push once  dextrose 50% Injectable 25 Gram(s) IV Push once  dextrose Oral Gel 15 Gram(s) Oral once PRN  glucagon  Injectable 1 milliGRAM(s) IntraMuscular once  insulin glargine Injectable (LANTUS) 25 Unit(s) SubCutaneous at bedtime  insulin lispro (ADMELOG) corrective regimen sliding scale   SubCutaneous every 6 hours    albuterol/ipratropium for Nebulization 3 milliLiter(s) Nebulizer every 6 hours  sodium chloride 3%  Inhalation 4 milliLiter(s) Inhalation every 6 hours    acetaminophen   Oral Liquid .. 650 milliGRAM(s) Oral every 6 hours PRN    pantoprazole   Suspension 40 milliGRAM(s) Enteral Tube daily        dextrose 5%. 1000 milliLiter(s) IV Continuous <Continuous>  dextrose 5%. 1000 milliLiter(s) IV Continuous <Continuous>  ferrous    sulfate Liquid 300 milliGRAM(s) Enteral Tube daily  Nephro-noam 1 Tablet(s) Oral daily    epoetin reilly-epbx (RETACRIT) Injectable 85131 Unit(s) IV Push <User Schedule>    chlorhexidine 0.12% Liquid 15 milliLiter(s) Oral Mucosa every 12 hours  chlorhexidine 2% Cloths 1 Application(s) Topical <User Schedule>  nystatin Powder 1 Application(s) Topical every 8 hours    lactobacillus acidophilus 1 Tablet(s) Oral daily      LABS:                        8.7    14.42 )-----------( 458      ( 06 Nov 2024 07:15 )             27.8     Hgb Trend: 8.7<--, 8.5<--, 9.4<--, 8.7<--  11-06    131[L]  |  97  |  57[H]  ----------------------------<  181[H]  3.7   |  24  |  3.72[H]    Ca    9.3      06 Nov 2024 07:15      Creatinine Trend: 3.72<--, 4.26<--, 3.21<--, 3.80<--, 4.46<--, 3.26<--    Urinalysis Basic - ( 06 Nov 2024 07:15 )    Color: x / Appearance: x / SG: x / pH: x  Gluc: 181 mg/dL / Ketone: x  / Bili: x / Urobili: x   Blood: x / Protein: x / Nitrite: x   Leuk Esterase: x / RBC: x / WBC x   Sq Epi: x / Non Sq Epi: x / Bacteria: x            MICROBIOLOGY:     Culture Results:   Moderate Pseudomonas aeruginosa  Commensal dominick consistent with body site (11-02-24 @ 06:50)  Culture Results:   No growth at 48 Hours (11-01-24 @ 19:05)  Culture Results:   No growth at 5 days (10-28-24 @ 15:00)  Culture Results:   No growth at 5 days (10-28-24 @ 15:00)  Culture Results:   No acid-fast bacilli isolated at 1 week. ****Acid-fast cultures are held  for 6 weeks.**** (10-25-24 @ 18:35)  Culture Results:   Moderate Pseudomonas aeruginosa (Carbapenem Resistant)  Commensal dominick consistent with body site (10-25-24 @ 17:30)  Culture Results:   No growth at 5 days (10-25-24 @ 16:55)  Culture Results:   No growth at 5 days (10-25-24 @ 16:55)  Culture Results:   No growth at 5 days (10-18-24 @ 11:10)  Culture Results:   No acid-fast bacilli isolated at 2 weeks. (10-14-24 @ 11:20)  Culture Results:   Few Pseudomonas aeruginosa  Commensal dominick consistent with body site (10-14-24 @ 11:20)      RADIOLOGY:  [ x] Reviewed    Interval Events:  Afebrile x 24 hours. Sleeping during assessment appears comfortable. tolerating PS 12/5/35% FiO2 today.     REVIEW OF SYSTEMS:  All negative unless listed within interval HPI     OBJECTIVE:  Vital Signs Last 24 Hrs  T(C): 36.8 (06 Nov 2024 04:36), Max: 36.9 (06 Nov 2024 00:06)  T(F): 98.2 (06 Nov 2024 04:36), Max: 98.4 (06 Nov 2024 00:06)  HR: 90 (06 Nov 2024 05:25) (86 - 105)  BP: 150/68 (06 Nov 2024 04:36) (114/62 - 165/67)  BP(mean): --  ABP: --  ABP(mean): --  RR: 19 (06 Nov 2024 04:36) (18 - 21)  SpO2: 99% (06 Nov 2024 05:25) (94% - 100%)    O2 Parameters below as of 06 Nov 2024 01:01  Patient On (Oxygen Delivery Method): ventilator          Mode: AC/ CMV (Assist Control/ Continuous Mandatory Ventilation), RR (machine): 16, TV (machine): 400, FiO2: 35, PEEP: 5, ITime: 0.9, MAP: 9, PIP: 23    11-05 @ 07:01  -  11-06 @ 07:00  --------------------------------------------------------  IN: 1340 mL / OUT: 2800 mL / NET: -1460 mL      CAPILLARY BLOOD GLUCOSE      POCT Blood Glucose.: 201 mg/dL (06 Nov 2024 07:22)      PHYSICAL EXAM:  General: well, no distress   HEENT: Sclera clear, EOMI, PERRL  Neck: + trache   Respiratory: scattered rhonchi , white sputum via trach   Cardiovascular: S1S2 RRR  Abdomen: soft, ND, NTTP, + BS , PEG   Extremities: no edema, THAI     HOSPITAL MEDICATIONS:  heparin   Injectable 5000 Unit(s) SubCutaneous every 12 hours      hydrALAZINE 50 milliGRAM(s) Oral every 8 hours    dextrose 50% Injectable 12.5 Gram(s) IV Push once  dextrose 50% Injectable 25 Gram(s) IV Push once  dextrose 50% Injectable 25 Gram(s) IV Push once  dextrose Oral Gel 15 Gram(s) Oral once PRN  glucagon  Injectable 1 milliGRAM(s) IntraMuscular once  insulin glargine Injectable (LANTUS) 25 Unit(s) SubCutaneous at bedtime  insulin lispro (ADMELOG) corrective regimen sliding scale   SubCutaneous every 6 hours    albuterol/ipratropium for Nebulization 3 milliLiter(s) Nebulizer every 6 hours  sodium chloride 3%  Inhalation 4 milliLiter(s) Inhalation every 6 hours    acetaminophen   Oral Liquid .. 650 milliGRAM(s) Oral every 6 hours PRN    pantoprazole   Suspension 40 milliGRAM(s) Enteral Tube daily        dextrose 5%. 1000 milliLiter(s) IV Continuous <Continuous>  dextrose 5%. 1000 milliLiter(s) IV Continuous <Continuous>  ferrous    sulfate Liquid 300 milliGRAM(s) Enteral Tube daily  Nephro-noam 1 Tablet(s) Oral daily    epoetin reilly-epbx (RETACRIT) Injectable 40568 Unit(s) IV Push <User Schedule>    chlorhexidine 0.12% Liquid 15 milliLiter(s) Oral Mucosa every 12 hours  chlorhexidine 2% Cloths 1 Application(s) Topical <User Schedule>  nystatin Powder 1 Application(s) Topical every 8 hours    lactobacillus acidophilus 1 Tablet(s) Oral daily      LABS:                        8.7    14.42 )-----------( 458      ( 06 Nov 2024 07:15 )             27.8     Hgb Trend: 8.7<--, 8.5<--, 9.4<--, 8.7<--  11-06    131[L]  |  97  |  57[H]  ----------------------------<  181[H]  3.7   |  24  |  3.72[H]    Ca    9.3      06 Nov 2024 07:15      Creatinine Trend: 3.72<--, 4.26<--, 3.21<--, 3.80<--, 4.46<--, 3.26<--    Urinalysis Basic - ( 06 Nov 2024 07:15 )    Color: x / Appearance: x / SG: x / pH: x  Gluc: 181 mg/dL / Ketone: x  / Bili: x / Urobili: x   Blood: x / Protein: x / Nitrite: x   Leuk Esterase: x / RBC: x / WBC x   Sq Epi: x / Non Sq Epi: x / Bacteria: x            MICROBIOLOGY:     Culture Results:   Moderate Pseudomonas aeruginosa  Commensal dominick consistent with body site (11-02-24 @ 06:50)  Culture Results:   No growth at 48 Hours (11-01-24 @ 19:05)  Culture Results:   No growth at 5 days (10-28-24 @ 15:00)  Culture Results:   No growth at 5 days (10-28-24 @ 15:00)  Culture Results:   No acid-fast bacilli isolated at 1 week. ****Acid-fast cultures are held  for 6 weeks.**** (10-25-24 @ 18:35)  Culture Results:   Moderate Pseudomonas aeruginosa (Carbapenem Resistant)  Commensal dominick consistent with body site (10-25-24 @ 17:30)  Culture Results:   No growth at 5 days (10-25-24 @ 16:55)  Culture Results:   No growth at 5 days (10-25-24 @ 16:55)  Culture Results:   No growth at 5 days (10-18-24 @ 11:10)  Culture Results:   No acid-fast bacilli isolated at 2 weeks. (10-14-24 @ 11:20)  Culture Results:   Few Pseudomonas aeruginosa  Commensal dominick consistent with body site (10-14-24 @ 11:20)      RADIOLOGY:  [ x] Reviewed

## 2024-11-07 ENCOUNTER — TRANSCRIPTION ENCOUNTER (OUTPATIENT)
Age: 74
End: 2024-11-07

## 2024-11-07 VITALS
DIASTOLIC BLOOD PRESSURE: 62 MMHG | HEART RATE: 108 BPM | OXYGEN SATURATION: 93 % | TEMPERATURE: 99 F | SYSTOLIC BLOOD PRESSURE: 120 MMHG

## 2024-11-07 LAB
CULTURE RESULTS: SIGNIFICANT CHANGE UP
GLUCOSE BLDC GLUCOMTR-MCNC: 181 MG/DL — HIGH (ref 70–99)
GLUCOSE BLDC GLUCOMTR-MCNC: 205 MG/DL — HIGH (ref 70–99)
SPECIMEN SOURCE: SIGNIFICANT CHANGE UP

## 2024-11-07 PROCEDURE — 99233 SBSQ HOSP IP/OBS HIGH 50: CPT

## 2024-11-07 PROCEDURE — 99239 HOSP IP/OBS DSCHRG MGMT >30: CPT

## 2024-11-07 RX ORDER — FERROUS SULFATE 325(65) MG
5 TABLET ORAL
Qty: 0 | Refills: 0 | DISCHARGE
Start: 2024-11-07

## 2024-11-07 RX ORDER — INSULIN GLARGINE,HUM.REC.ANLOG 100/ML
25 VIAL (ML) SUBCUTANEOUS
Qty: 0 | Refills: 0 | DISCHARGE
Start: 2024-11-07

## 2024-11-07 RX ORDER — IPRATROPIUM BROMIDE AND ALBUTEROL SULFATE .5; 2.5 MG/3ML; MG/3ML
3 SOLUTION RESPIRATORY (INHALATION)
Qty: 0 | Refills: 0 | DISCHARGE
Start: 2024-11-07

## 2024-11-07 RX ORDER — HYDRALAZINE HYDROCHLORIDE 50 MG/1
1 TABLET, FILM COATED ORAL
Qty: 0 | Refills: 0 | DISCHARGE
Start: 2024-11-07

## 2024-11-07 RX ADMIN — CHLORHEXIDINE GLUCONATE 15 MILLILITER(S): 40 SOLUTION TOPICAL at 05:26

## 2024-11-07 RX ADMIN — HEPARIN SODIUM 5000 UNIT(S): 10000 INJECTION INTRAVENOUS; SUBCUTANEOUS at 05:26

## 2024-11-07 RX ADMIN — NYSTATIN 1 APPLICATION(S): 100000 POWDER TOPICAL at 15:13

## 2024-11-07 RX ADMIN — Medication 2: at 05:27

## 2024-11-07 RX ADMIN — NYSTATIN 1 APPLICATION(S): 100000 POWDER TOPICAL at 05:35

## 2024-11-07 RX ADMIN — Medication 1 TABLET(S): at 12:01

## 2024-11-07 RX ADMIN — Medication 2: at 00:28

## 2024-11-07 RX ADMIN — SODIUM CHLORIDE 4 MILLILITER(S): 9 INJECTION, SOLUTION INTRAMUSCULAR; INTRAVENOUS; SUBCUTANEOUS at 06:10

## 2024-11-07 RX ADMIN — Medication 300 MILLIGRAM(S): at 12:01

## 2024-11-07 RX ADMIN — HYDRALAZINE HYDROCHLORIDE 50 MILLIGRAM(S): 50 TABLET, FILM COATED ORAL at 05:27

## 2024-11-07 RX ADMIN — CHLORHEXIDINE GLUCONATE 1 APPLICATION(S): 40 SOLUTION TOPICAL at 05:26

## 2024-11-07 RX ADMIN — SODIUM CHLORIDE 4 MILLILITER(S): 9 INJECTION, SOLUTION INTRAMUSCULAR; INTRAVENOUS; SUBCUTANEOUS at 11:37

## 2024-11-07 RX ADMIN — ERYTHROPOIETIN 10000 UNIT(S): 10000 INJECTION, SOLUTION INTRAVENOUS; SUBCUTANEOUS at 15:18

## 2024-11-07 RX ADMIN — Medication 4: at 11:54

## 2024-11-07 RX ADMIN — PANTOPRAZOLE SODIUM 40 MILLIGRAM(S): 40 TABLET, DELAYED RELEASE ORAL at 12:00

## 2024-11-07 RX ADMIN — IPRATROPIUM BROMIDE AND ALBUTEROL SULFATE 3 MILLILITER(S): .5; 2.5 SOLUTION RESPIRATORY (INHALATION) at 06:09

## 2024-11-07 RX ADMIN — IPRATROPIUM BROMIDE AND ALBUTEROL SULFATE 3 MILLILITER(S): .5; 2.5 SOLUTION RESPIRATORY (INHALATION) at 11:37

## 2024-11-07 NOTE — PROGRESS NOTE ADULT - NS ATTEND OPT1A GEN_ALL_CORE
Medical decision making
History/Exam/Medical decision making
Medical decision making
Medical decision making
History/Exam/Medical decision making

## 2024-11-07 NOTE — PROGRESS NOTE ADULT - NS ATTEND OPT1 GEN_ALL_CORE
I attest my time as attending is greater than 50% of the total combined time spent on qualifying patient care activities by the PA/NP and attending.
I independently performed the documented:
I independently performed the documented:
I attest my time as attending is greater than 50% of the total combined time spent on qualifying patient care activities by the PA/NP and attending.
I independently performed the documented:
I independently performed the documented:
I attest my time as attending is greater than 50% of the total combined time spent on qualifying patient care activities by the PA/NP and attending.
I independently performed the documented:
I attest my time as attending is greater than 50% of the total combined time spent on qualifying patient care activities by the PA/NP and attending.
I independently performed the documented:

## 2024-11-07 NOTE — PROGRESS NOTE ADULT - ASSESSMENT
73-year-old male with hypertension, type 2 diabetes, ESRD on hemodialysis, CVA with residual right-sided weakness and chronic dysphagia status post PEG and trach previous decannulated, right lower extremity DVT recently mated for acute hypoxic respiratory failure secondary to right lower lobe pneumonia due to aspiration.  Patient failed multiple attempts at extubation and tracheostomy was placed on October 23. Pulmonary consulted for continued ventilator management.    Plan  – Patient completed 7 day course of Zerbaxa for multidrug-resistant Pseudomonas aeruginosa's in the sputum  – Repeat trach culture from 11/2 growing Pseudomonas and Stenotrophomonas could be colonization at this time, appreciate ID recs   – Currently afebrile   – Pressure support as tolerated  – Aggressive pulmonary toileting with chest PT and DuoNebs and hypertonic saline  – Tracheostomy care per routine with frequent suctioning  – HD per nephrology  – Continue to work with social work and case management on long-term placement  – Rest of care per primary team.

## 2024-11-07 NOTE — DISCHARGE NOTE PROVIDER - NSDCCPCAREPLAN_GEN_ALL_CORE_FT
PRINCIPAL DISCHARGE DIAGNOSIS  Diagnosis: Acute respiratory failure with hypoxia  Assessment and Plan of Treatment: due to pneumonia      SECONDARY DISCHARGE DIAGNOSES  Diagnosis: Sepsis due to pneumonia  Assessment and Plan of Treatment:     Diagnosis: Hypertensive urgency  Assessment and Plan of Treatment:     Diagnosis: Constipation  Assessment and Plan of Treatment:     Diagnosis: ESRD on hemodialysis  Assessment and Plan of Treatment:     Diagnosis: Insulin dependent type 2 diabetes mellitus  Assessment and Plan of Treatment:     Diagnosis: Lactic acidosis  Assessment and Plan of Treatment:     Diagnosis: ESRD on dialysis  Assessment and Plan of Treatment:     Diagnosis: Functional quadriplegia  Assessment and Plan of Treatment:

## 2024-11-07 NOTE — PROGRESS NOTE ADULT - SUBJECTIVE AND OBJECTIVE BOX
BALDO JIMMY  73y  Patient is a 73y old  Male who presents with a chief complaint of Sepsis and acute hypoxic respiratory failure secondary to suspected aspiration PNA vs. HCAP (07 Nov 2024 07:50)    HPI:  Seen and examined. ESRD on HD, followed for ESRD.  Awaiting for Transfer.    HEALTH ISSUES - PROBLEM Dx:  Sepsis due to pneumonia    Acute respiratory failure with hypoxia    Hypertensive urgency    Constipation    Lactic acidosis    Insulin dependent type 2 diabetes mellitus    ESRD on hemodialysis    History of cerebrovascular accident (CVA) with residual deficit    History of DVT of lower extremity    Microcytic anemia    Hypoxia    Severe protein-calorie malnutrition    Encounter for palliative care          MEDICATIONS  (STANDING):  albuterol/ipratropium for Nebulization 3 milliLiter(s) Nebulizer every 6 hours  chlorhexidine 0.12% Liquid 15 milliLiter(s) Oral Mucosa every 12 hours  chlorhexidine 2% Cloths 1 Application(s) Topical <User Schedule>  dextrose 5%. 1000 milliLiter(s) (50 mL/Hr) IV Continuous <Continuous>  dextrose 5%. 1000 milliLiter(s) (100 mL/Hr) IV Continuous <Continuous>  dextrose 50% Injectable 12.5 Gram(s) IV Push once  dextrose 50% Injectable 25 Gram(s) IV Push once  dextrose 50% Injectable 25 Gram(s) IV Push once  epoetin reilly-epbx (RETACRIT) Injectable 08256 Unit(s) IV Push <User Schedule>  ferrous    sulfate Liquid 300 milliGRAM(s) Enteral Tube daily  glucagon  Injectable 1 milliGRAM(s) IntraMuscular once  heparin   Injectable 5000 Unit(s) SubCutaneous every 12 hours  hydrALAZINE 50 milliGRAM(s) Oral every 8 hours  insulin glargine Injectable (LANTUS) 25 Unit(s) SubCutaneous at bedtime  insulin lispro (ADMELOG) corrective regimen sliding scale   SubCutaneous every 6 hours  lactobacillus acidophilus 1 Tablet(s) Oral daily  Nephro-noam 1 Tablet(s) Oral daily  nystatin Powder 1 Application(s) Topical every 8 hours  pantoprazole   Suspension 40 milliGRAM(s) Enteral Tube daily  sodium chloride 3%  Inhalation 4 milliLiter(s) Inhalation every 6 hours    MEDICATIONS  (PRN):  acetaminophen     Tablet .. 650 milliGRAM(s) Oral every 6 hours PRN Temp greater or equal to 38C (100.4F), Mild Pain (1 - 3)  dextrose Oral Gel 15 Gram(s) Oral once PRN Blood Glucose LESS THAN 70 milliGRAM(s)/deciliter    Vital Signs Last 24 Hrs  T(C): 36.4 (07 Nov 2024 10:58), Max: 37.6 (06 Nov 2024 17:45)  T(F): 97.6 (07 Nov 2024 10:58), Max: 99.7 (06 Nov 2024 17:45)  HR: 90 (07 Nov 2024 11:40) (77 - 114)  BP: 169/78 (07 Nov 2024 10:58) (122/65 - 169/78)  BP(mean): --  RR: 18 (07 Nov 2024 10:58) (18 - 18)  SpO2: 99% (07 Nov 2024 11:40) (93% - 100%)    Parameters below as of 07 Nov 2024 10:58  Patient On (Oxygen Delivery Method): ventilator      Daily     Daily     PHYSICAL EXAM:  Constitutional:  He appears comfortable and not distressed. Not diaphoretic.    Neck:  The thyroid is normal. Trachea is midline.     Breasts: Normal examination.    Respiratory: The lungs are clear to auscultation. No dullness and expansion is normal.    Cardiovascular: S1 and S2 are normal. No mummurs, rubs or gallops are present.    Gastrointestinal: The abdomen is soft. No tenderness is present. No masses are present. Bowel sounds are normal.    Genitourinary: The bladder is not distended. No CVA tenderness is present.    Extremities: No edema is noted. No deformities are present.    Neurological: Cognition is normal. Tone, power and sensation are normal.     Skin: No lesions are seen  or palpated.    Lymph Nodes: No lymphadenopathy is present.    Psychiatric: Mood is appropriate. No hallucinations or flight of ideas are noted.                              8.7    14.42 )-----------( 458      ( 06 Nov 2024 07:15 )             27.8     11-06    131[L]  |  97  |  57[H]  ----------------------------<  181[H]  3.7   |  24  |  3.72[H]    Ca    9.3      06 Nov 2024 07:15      Urinalysis Basic - ( 06 Nov 2024 07:15 )    Color: x / Appearance: x / SG: x / pH: x  Gluc: 181 mg/dL / Ketone: x  / Bili: x / Urobili: x   Blood: x / Protein: x / Nitrite: x   Leuk Esterase: x / RBC: x / WBC x   Sq Epi: x / Non Sq Epi: x / Bacteria: x

## 2024-11-07 NOTE — PROGRESS NOTE ADULT - TIME BILLING
Review of patient records, including history, laboratory data, imaging. Patient evaluation and assessment. Coordination of care. Time excludes teaching.

## 2024-11-07 NOTE — PROGRESS NOTE ADULT - NS ATTEND AMEND GEN_ALL_CORE FT
73-year-old male with hypertension, type 2 diabetes, ESRD on hemodialysis, CVA with residual right-sided weakness and chronic dysphagia status post PEG and trach previous decannulated, right lower extremity DVT recently mated for acute hypoxic respiratory failure secondary to right lower lobe pneumonia due to aspiration.  Patient failed multiple attempts at extubation and tracheostomy was placed on October 23. Pulmonary consulted for continued ventilator management.    Plan  – Repeat trach culture from 11/2 growing Pseudomonas and Stenotrophomonas; could be colonization    – Patient continues to be febrile   – Pressure support as tolerated  – Aggressive pulmonary toileting with chest PT and DuoNebs and hypertonic saline  – Tracheostomy care per routine with frequent suctioning  – HD per nephrology  – Continue to work with social work and case management on long-term placement  – Rest of care per primary team.

## 2024-11-07 NOTE — DISCHARGE NOTE NURSING/CASE MANAGEMENT/SOCIAL WORK - FINANCIAL ASSISTANCE
[facility] provides services at a reduced cost to those who are determined to be eligible through [facility]’s financial assistance program. Information regarding [facility]’s financial assistance program can be found by going to [email or web address] or by calling [phone].

## 2024-11-07 NOTE — PROGRESS NOTE ADULT - NUTRITIONAL ASSESSMENT
This patient has been assessed with a concern for Malnutrition and has been determined to have a diagnosis/diagnoses of Severe protein-calorie malnutrition and Underweight (BMI < 19).    This patient is being managed with:   Diet NPO with Tube Feed-  Tube Feeding Modality: Gastrostomy  Nepro with Carb Steady  Total Volume for 24 Hours (mL): 180  Continuous  Starting Tube Feed Rate {mL per Hour}: 10  Increase Tube Feed Rate by (mL): 0     Every 8 hours  Until Goal Tube Feed Rate (mL per Hour): 10  Tube Feed Duration (in Hours): 18  Tube Feed Start Time: 17:00  Tube Feed Stop Time: 11:00  Liquid Protein Supplement     Qty per Day:  1  Entered: Oct 12 2024  4:59PM  
This patient has been assessed with a concern for Malnutrition and has been determined to have a diagnosis/diagnoses of Severe protein-calorie malnutrition and Underweight (BMI < 19).    This patient is being managed with:   Diet NPO with Tube Feed-  Tube Feeding Modality: Gastrostomy  Nepro with Carb Steady  Total Volume for 24 Hours (mL): 900  Continuous  Starting Tube Feed Rate {mL per Hour}: 20  Increase Tube Feed Rate by (mL): 5     Every 12 hours  Until Goal Tube Feed Rate (mL per Hour): 50  Tube Feed Duration (in Hours): 18  Tube Feed Start Time: 17:00  Tube Feed Stop Time: 11:00  Entered: Oct 17 2024  4:42PM  
This patient has been assessed with a concern for Malnutrition and has been determined to have a diagnosis/diagnoses of Severe protein-calorie malnutrition and Underweight (BMI < 19).    This patient is being managed with:   Diet NPO with Tube Feed-  Tube Feeding Modality: Gastrostomy  Nepro with Carb Steady  Total Volume for 24 Hours (mL): 948  Bolus  Total Volume of Bolus (mL):  237  Total # of Feeds: 4  Tube Feed Frequency: Every 6 hours   Tube Feed Start Time: 14:00  Bolus Feed Rate (mL per Hour): 237   Bolus Feed Duration (in Hours): 1  Free Water Flush  Bolus   Total Volume per Flush (mL): 150   Frequency: Every 6 Hours   Total Daily Volume of Flush (mL): 600  Free Water Flush Instructions:  Hold H2O flushes until Na levels WDL; then add H2O flushes of 150 ml q 6 hours  Liquid Protein Supplement     Qty per Day:  1  Entered: Oct  6 2024 12:21PM    The following pending diet order is being considered for treatment of Severe protein-calorie malnutrition and Underweight (BMI < 19):  Diet NPO with Tube Feed-  Tube Feeding Modality: Gastrostomy  Nepro with Carb Steady  Total Volume for 24 Hours (mL): 990  Continuous  Starting Tube Feed Rate {mL per Hour}: 40  Increase Tube Feed Rate by (mL): 5     Every 8 hours  Until Goal Tube Feed Rate (mL per Hour): 55  Tube Feed Duration (in Hours): 18  Tube Feed Start Time: 11:00  Liquid Protein Supplement     Qty per Day:  1  Entered: Oct  9 2024  2:54PM  
This patient has been assessed with a concern for Malnutrition and has been determined to have a diagnosis/diagnoses of Severe protein-calorie malnutrition.    This patient is being managed with:   Diet NPO with Tube Feed-  Tube Feeding Modality: Gastrostomy  Nepro with Carb Steady  Total Volume for 24 Hours (mL): 900  Continuous  Starting Tube Feed Rate {mL per Hour}: 25  Increase Tube Feed Rate by (mL): 5     Every 12 hours  Until Goal Tube Feed Rate (mL per Hour): 50  Tube Feed Duration (in Hours): 18  Tube Feed Start Time: 17:00  Tube Feed Stop Time: 11:00  Liquid Protein Supplement     Qty per Day:  1  Entered: Oct 23 2024 11:28AM  
This patient has been assessed with a concern for Malnutrition and has been determined to have a diagnosis/diagnoses of Severe protein-calorie malnutrition.    This patient is being managed with:   Diet NPO with Tube Feed-  Tube Feeding Modality: Gastrostomy  Nepro with Carb Steady  Total Volume for 24 Hours (mL): 960  Continuous  Until Goal Tube Feed Rate (mL per Hour): 40  Tube Feed Duration (in Hours): 24  Tube Feed Start Time: 13:00  Free Water Flush Instructions:  100 ml q 8 hours  Liquid Protein Supplement     Qty per Day:  1  Entered: Nov 5 2024 12:44PM  
This patient has been assessed with a concern for Malnutrition and has been determined to have a diagnosis/diagnoses of Underweight (BMI < 19) and Severe protein-calorie malnutrition.    This patient is being managed with:   Diet NPO with Tube Feed-  Tube Feeding Modality: Gastrostomy  Nepro with Carb Steady  Total Volume for 24 Hours (mL): 900  Continuous  Starting Tube Feed Rate {mL per Hour}: 20  Increase Tube Feed Rate by (mL): 5     Every 12 hours  Until Goal Tube Feed Rate (mL per Hour): 50  Tube Feed Duration (in Hours): 18  Tube Feed Start Time: 17:00  Tube Feed Stop Time: 11:00  Entered: Oct 21 2024  9:39AM  
This patient has been assessed with a concern for Malnutrition and has been determined to have a diagnosis/diagnoses of Severe protein-calorie malnutrition and Underweight (BMI < 19).    This patient is being managed with:   Diet NPO with Tube Feed-  Tube Feeding Modality: Gastrostomy  Nepro with Carb Steady  Total Volume for 24 Hours (mL): 180  Continuous  Starting Tube Feed Rate {mL per Hour}: 10  Increase Tube Feed Rate by (mL): 0     Every 8 hours  Until Goal Tube Feed Rate (mL per Hour): 10  Tube Feed Duration (in Hours): 18  Tube Feed Start Time: 17:00  Tube Feed Stop Time: 11:00  Liquid Protein Supplement     Qty per Day:  1  Entered: Oct 12 2024  4:59PM  
This patient has been assessed with a concern for Malnutrition and has been determined to have a diagnosis/diagnoses of Severe protein-calorie malnutrition and Underweight (BMI < 19).    This patient is being managed with:   Diet NPO with Tube Feed-  Tube Feeding Modality: Gastrostomy  Nepro with Carb Steady  Total Volume for 24 Hours (mL): 180  Continuous  Starting Tube Feed Rate {mL per Hour}: 10  Increase Tube Feed Rate by (mL): 0     Every 8 hours  Until Goal Tube Feed Rate (mL per Hour): 10  Tube Feed Duration (in Hours): 18  Tube Feed Start Time: 17:00  Tube Feed Stop Time: 11:00  Liquid Protein Supplement     Qty per Day:  1  Entered: Oct 12 2024  4:59PM  
This patient has been assessed with a concern for Malnutrition and has been determined to have a diagnosis/diagnoses of Severe protein-calorie malnutrition and Underweight (BMI < 19).    This patient is being managed with:   Diet NPO with Tube Feed-  Tube Feeding Modality: Gastrostomy  Nepro with Carb Steady  Total Volume for 24 Hours (mL): 990  Continuous  Starting Tube Feed Rate {mL per Hour}: 40  Increase Tube Feed Rate by (mL): 5     Every 8 hours  Until Goal Tube Feed Rate (mL per Hour): 55  Tube Feed Duration (in Hours): 18  Tube Feed Start Time: 11:00  Liquid Protein Supplement     Qty per Day:  1  Entered: Oct  9 2024  8:15PM  
This patient has been assessed with a concern for Malnutrition and has been determined to have a diagnosis/diagnoses of Severe protein-calorie malnutrition and Underweight (BMI < 19).    This patient is being managed with:   Diet NPO with Tube Feed-  Tube Feeding Modality: Gastrostomy  Nepro with Carb Steady  Total Volume for 24 Hours (mL): 990  Continuous  Starting Tube Feed Rate {mL per Hour}: 40  Increase Tube Feed Rate by (mL): 5     Every 8 hours  Until Goal Tube Feed Rate (mL per Hour): 55  Tube Feed Duration (in Hours): 18  Tube Feed Start Time: 11:00  Liquid Protein Supplement     Qty per Day:  1  Entered: Oct  9 2024  8:15PM  
This patient has been assessed with a concern for Malnutrition and has been determined to have a diagnosis/diagnoses of Severe protein-calorie malnutrition.    This patient is being managed with:   Diet NPO with Tube Feed-  Tube Feeding Modality: Gastrostomy  Nepro with Carb Steady  Total Volume for 24 Hours (mL): 900  Continuous  Starting Tube Feed Rate {mL per Hour}: 25  Increase Tube Feed Rate by (mL): 5     Every 12 hours  Until Goal Tube Feed Rate (mL per Hour): 50  Tube Feed Duration (in Hours): 18  Tube Feed Start Time: 17:00  Tube Feed Stop Time: 11:00  Liquid Protein Supplement     Qty per Day:  1  Entered: Oct 23 2024 11:28AM  
This patient has been assessed with a concern for Malnutrition and has been determined to have a diagnosis/diagnoses of Severe protein-calorie malnutrition.    This patient is being managed with:   Diet NPO with Tube Feed-  Tube Feeding Modality: Gastrostomy  Nepro with Carb Steady  Total Volume for 24 Hours (mL): 960  Continuous  Until Goal Tube Feed Rate (mL per Hour): 40  Tube Feed Duration (in Hours): 24  Tube Feed Start Time: 13:00  Free Water Flush Instructions:  100 ml q 8 hours  Liquid Protein Supplement     Qty per Day:  1  Entered: Nov 5 2024 12:44PM  
This patient has been assessed with a concern for Malnutrition and has been determined to have a diagnosis/diagnoses of Severe protein-calorie malnutrition.    This patient is being managed with:   Diet NPO with Tube Feed-  Tube Feeding Modality: Gastrostomy  Nepro with Carb Steady  Total Volume for 24 Hours (mL): 960  Continuous  Until Goal Tube Feed Rate (mL per Hour): 40  Tube Feed Duration (in Hours): 24  Tube Feed Start Time: 13:00  Free Water Flush Instructions:  100 ml q 8 hours  Liquid Protein Supplement     Qty per Day:  1  Entered: Nov 5 2024 12:44PM  
This patient has been assessed with a concern for Malnutrition and has been determined to have a diagnosis/diagnoses of Severe protein-calorie malnutrition and Underweight (BMI < 19).    This patient is being managed with:   Diet NPO with Tube Feed-  Tube Feeding Modality: Gastrostomy  Nepro with Carb Steady  Total Volume for 24 Hours (mL): 180  Continuous  Starting Tube Feed Rate {mL per Hour}: 10  Increase Tube Feed Rate by (mL): 0     Every 8 hours  Until Goal Tube Feed Rate (mL per Hour): 10  Tube Feed Duration (in Hours): 18  Tube Feed Start Time: 17:00  Tube Feed Stop Time: 11:00  Liquid Protein Supplement     Qty per Day:  1  Entered: Oct 12 2024  4:59PM  
This patient has been assessed with a concern for Malnutrition and has been determined to have a diagnosis/diagnoses of Severe protein-calorie malnutrition and Underweight (BMI < 19).    This patient is being managed with:   Diet NPO with Tube Feed-  Tube Feeding Modality: Gastrostomy  Nepro with Carb Steady  Total Volume for 24 Hours (mL): 900  Continuous  Starting Tube Feed Rate {mL per Hour}: 20  Increase Tube Feed Rate by (mL): 5     Every 12 hours  Until Goal Tube Feed Rate (mL per Hour): 50  Tube Feed Duration (in Hours): 18  Tube Feed Start Time: 17:00  Tube Feed Stop Time: 11:00  Entered: Oct 17 2024  4:42PM  
This patient has been assessed with a concern for Malnutrition and has been determined to have a diagnosis/diagnoses of Severe protein-calorie malnutrition and Underweight (BMI < 19).    This patient is being managed with:   Diet NPO with Tube Feed-  Tube Feeding Modality: Gastrostomy  Nepro with Carb Steady  Total Volume for 24 Hours (mL): 948  Bolus  Total Volume of Bolus (mL):  237  Total # of Feeds: 4  Tube Feed Frequency: Every 6 hours   Tube Feed Start Time: 14:00  Bolus Feed Rate (mL per Hour): 237   Bolus Feed Duration (in Hours): 1  Free Water Flush  Bolus   Total Volume per Flush (mL): 150   Frequency: Every 6 Hours   Total Daily Volume of Flush (mL): 600  Free Water Flush Instructions:  Hold H2O flushes until Na levels WDL; then add H2O flushes of 150 ml q 6 hours  Liquid Protein Supplement     Qty per Day:  1  Entered: Oct  6 2024 12:21PM  
This patient has been assessed with a concern for Malnutrition and has been determined to have a diagnosis/diagnoses of Severe protein-calorie malnutrition and Underweight (BMI < 19).    This patient is being managed with:   Diet NPO with Tube Feed-  Tube Feeding Modality: Gastrostomy  Nepro with Carb Steady  Total Volume for 24 Hours (mL): 990  Continuous  Starting Tube Feed Rate {mL per Hour}: 40  Increase Tube Feed Rate by (mL): 5     Every 8 hours  Until Goal Tube Feed Rate (mL per Hour): 55  Tube Feed Duration (in Hours): 18  Tube Feed Start Time: 11:00  Liquid Protein Supplement     Qty per Day:  1  Entered: Oct  9 2024  8:15PM  
This patient has been assessed with a concern for Malnutrition and has been determined to have a diagnosis/diagnoses of Severe protein-calorie malnutrition.    This patient is being managed with:   Diet NPO with Tube Feed-  Tube Feeding Modality: Gastrostomy  Nepro with Carb Steady  Total Volume for 24 Hours (mL): 900  Continuous  Starting Tube Feed Rate {mL per Hour}: 25  Increase Tube Feed Rate by (mL): 5     Every 12 hours  Until Goal Tube Feed Rate (mL per Hour): 50  Tube Feed Duration (in Hours): 18  Tube Feed Start Time: 17:00  Tube Feed Stop Time: 11:00  Liquid Protein Supplement     Qty per Day:  1  Entered: Oct 23 2024 11:28AM  
This patient has been assessed with a concern for Malnutrition and has been determined to have a diagnosis/diagnoses of Severe protein-calorie malnutrition and Underweight (BMI < 19).    This patient is being managed with:   Diet NPO with Tube Feed-  Tube Feeding Modality: Gastrostomy  Nepro with Carb Steady  Total Volume for 24 Hours (mL): 240  Continuous  Starting Tube Feed Rate {mL per Hour}: 10  Until Goal Tube Feed Rate (mL per Hour): 10  Tube Feed Duration (in Hours): 24  Tube Feed Start Time: 18:00  Entered: Oct 20 2024  5:38PM  
This patient has been assessed with a concern for Malnutrition and has been determined to have a diagnosis/diagnoses of Severe protein-calorie malnutrition and Underweight (BMI < 19).    This patient is being managed with:   Diet NPO with Tube Feed-  Tube Feeding Modality: Gastrostomy  Nepro with Carb Steady  Total Volume for 24 Hours (mL): 900  Continuous  Starting Tube Feed Rate {mL per Hour}: 20  Increase Tube Feed Rate by (mL): 5     Every 12 hours  Until Goal Tube Feed Rate (mL per Hour): 50  Tube Feed Duration (in Hours): 18  Tube Feed Start Time: 17:00  Tube Feed Stop Time: 11:00  Entered: Oct 17 2024  4:42PM  
This patient has been assessed with a concern for Malnutrition and has been determined to have a diagnosis/diagnoses of Severe protein-calorie malnutrition and Underweight (BMI < 19).    This patient is being managed with:   Diet NPO with Tube Feed-  Tube Feeding Modality: Gastrostomy  Nepro with Carb Steady  Total Volume for 24 Hours (mL): 900  Continuous  Starting Tube Feed Rate {mL per Hour}: 20  Increase Tube Feed Rate by (mL): 5     Every 12 hours  Until Goal Tube Feed Rate (mL per Hour): 50  Tube Feed Duration (in Hours): 18  Tube Feed Start Time: 17:00  Tube Feed Stop Time: 11:00  Entered: Oct 17 2024  4:42PM  
This patient has been assessed with a concern for Malnutrition and has been determined to have a diagnosis/diagnoses of Severe protein-calorie malnutrition and Underweight (BMI < 19).    This patient is being managed with:   Diet NPO with Tube Feed-  Tube Feeding Modality: Gastrostomy  Nepro with Carb Steady  Total Volume for 24 Hours (mL): 900  Continuous  Starting Tube Feed Rate {mL per Hour}: 20  Increase Tube Feed Rate by (mL): 5     Every 12 hours  Until Goal Tube Feed Rate (mL per Hour): 50  Tube Feed Duration (in Hours): 18  Tube Feed Start Time: 17:00  Tube Feed Stop Time: 11:00  Entered: Oct 21 2024  9:39AM  
This patient has been assessed with a concern for Malnutrition and has been determined to have a diagnosis/diagnoses of Severe protein-calorie malnutrition and Underweight (BMI < 19).    This patient is being managed with:   Diet NPO with Tube Feed-  Tube Feeding Modality: Gastrostomy  Nepro with Carb Steady  Total Volume for 24 Hours (mL): 948  Bolus  Total Volume of Bolus (mL):  237  Total # of Feeds: 4  Tube Feed Frequency: Every 6 hours   Tube Feed Start Time: 14:00  Bolus Feed Rate (mL per Hour): 237   Bolus Feed Duration (in Hours): 1  Free Water Flush  Bolus   Total Volume per Flush (mL): 150   Frequency: Every 6 Hours   Total Daily Volume of Flush (mL): 600  Free Water Flush Instructions:  Hold H2O flushes until Na levels WDL; then add H2O flushes of 150 ml q 6 hours  Liquid Protein Supplement     Qty per Day:  1  Entered: Oct  6 2024 12:21PM  
This patient has been assessed with a concern for Malnutrition and has been determined to have a diagnosis/diagnoses of Severe protein-calorie malnutrition.    This patient is being managed with:   Diet NPO with Tube Feed-  Tube Feeding Modality: Gastrostomy  Nepro with Carb Steady  Total Volume for 24 Hours (mL): 900  Continuous  Starting Tube Feed Rate {mL per Hour}: 25  Increase Tube Feed Rate by (mL): 5     Every 12 hours  Until Goal Tube Feed Rate (mL per Hour): 50  Tube Feed Duration (in Hours): 18  Tube Feed Start Time: 17:00  Tube Feed Stop Time: 11:00  Liquid Protein Supplement     Qty per Day:  1  Entered: Oct 23 2024 11:28AM  
This patient has been assessed with a concern for Malnutrition and has been determined to have a diagnosis/diagnoses of Severe protein-calorie malnutrition.    This patient is being managed with:   Diet NPO with Tube Feed-  Tube Feeding Modality: Gastrostomy  Nepro with Carb Steady  Total Volume for 24 Hours (mL): 900  Continuous  Starting Tube Feed Rate {mL per Hour}: 25  Increase Tube Feed Rate by (mL): 5     Every 12 hours  Until Goal Tube Feed Rate (mL per Hour): 50  Tube Feed Duration (in Hours): 18  Tube Feed Start Time: 17:00  Tube Feed Stop Time: 11:00  Liquid Protein Supplement     Qty per Day:  1  Entered: Oct 23 2024 11:28AM  
This patient has been assessed with a concern for Malnutrition and has been determined to have a diagnosis/diagnoses of Severe protein-calorie malnutrition and Underweight (BMI < 19).    This patient is being managed with:   Diet NPO with Tube Feed-  Tube Feeding Modality: Gastrostomy  Nepro with Carb Steady  Total Volume for 24 Hours (mL): 900  Continuous  Starting Tube Feed Rate {mL per Hour}: 20  Increase Tube Feed Rate by (mL): 5     Every 12 hours  Until Goal Tube Feed Rate (mL per Hour): 50  Tube Feed Duration (in Hours): 18  Tube Feed Start Time: 17:00  Tube Feed Stop Time: 11:00  Entered: Oct 17 2024  4:42PM  
This patient has been assessed with a concern for Malnutrition and has been determined to have a diagnosis/diagnoses of Severe protein-calorie malnutrition and Underweight (BMI < 19).    This patient is being managed with:   Diet NPO with Tube Feed-  Tube Feeding Modality: Gastrostomy  Nepro with Carb Steady  Total Volume for 24 Hours (mL): 948  Bolus  Total Volume of Bolus (mL):  237  Total # of Feeds: 4  Tube Feed Frequency: Every 6 hours   Tube Feed Start Time: 14:00  Bolus Feed Rate (mL per Hour): 237   Bolus Feed Duration (in Hours): 1  Free Water Flush  Bolus   Total Volume per Flush (mL): 150   Frequency: Every 6 Hours   Total Daily Volume of Flush (mL): 600  Free Water Flush Instructions:  Hold H2O flushes until Na levels WDL; then add H2O flushes of 150 ml q 6 hours  Liquid Protein Supplement     Qty per Day:  1  Entered: Oct  6 2024 12:21PM  
This patient has been assessed with a concern for Malnutrition and has been determined to have a diagnosis/diagnoses of Severe protein-calorie malnutrition.    This patient is being managed with:   Diet NPO with Tube Feed-  Tube Feeding Modality: Gastrostomy  Nepro with Carb Steady  Total Volume for 24 Hours (mL): 900  Continuous  Starting Tube Feed Rate {mL per Hour}: 25  Increase Tube Feed Rate by (mL): 5     Every 12 hours  Until Goal Tube Feed Rate (mL per Hour): 50  Tube Feed Duration (in Hours): 18  Tube Feed Start Time: 17:00  Tube Feed Stop Time: 11:00  Liquid Protein Supplement     Qty per Day:  1  Entered: Oct 23 2024 11:28AM  
This patient has been assessed with a concern for Malnutrition and has been determined to have a diagnosis/diagnoses of Severe protein-calorie malnutrition and Underweight (BMI < 19).    This patient is being managed with:   Diet NPO with Tube Feed-  Tube Feeding Modality: Gastrostomy  Nepro with Carb Steady  Total Volume for 24 Hours (mL): 990  Continuous  Starting Tube Feed Rate {mL per Hour}: 40  Increase Tube Feed Rate by (mL): 5     Every 8 hours  Until Goal Tube Feed Rate (mL per Hour): 55  Tube Feed Duration (in Hours): 18  Tube Feed Start Time: 11:00  Liquid Protein Supplement     Qty per Day:  1  Entered: Oct  9 2024  8:15PM  
This patient has been assessed with a concern for Malnutrition and has been determined to have a diagnosis/diagnoses of Severe protein-calorie malnutrition.    This patient is being managed with:   Diet NPO with Tube Feed-  Tube Feeding Modality: Gastrostomy  Nepro with Carb Steady  Total Volume for 24 Hours (mL): 900  Continuous  Starting Tube Feed Rate {mL per Hour}: 25  Increase Tube Feed Rate by (mL): 5     Every 12 hours  Until Goal Tube Feed Rate (mL per Hour): 50  Tube Feed Duration (in Hours): 18  Tube Feed Start Time: 17:00  Tube Feed Stop Time: 11:00  Liquid Protein Supplement     Qty per Day:  1  Entered: Oct 23 2024 11:28AM  
This patient has been assessed with a concern for Malnutrition and has been determined to have a diagnosis/diagnoses of Severe protein-calorie malnutrition and Underweight (BMI < 19).    This patient is being managed with:   Diet NPO with Tube Feed-  Tube Feeding Modality: Gastrostomy  Nepro with Carb Steady  Total Volume for 24 Hours (mL): 180  Continuous  Starting Tube Feed Rate {mL per Hour}: 10  Increase Tube Feed Rate by (mL): 0     Every 8 hours  Until Goal Tube Feed Rate (mL per Hour): 10  Tube Feed Duration (in Hours): 18  Tube Feed Start Time: 17:00  Tube Feed Stop Time: 11:00  Liquid Protein Supplement     Qty per Day:  1  Entered: Oct 12 2024  4:59PM  
This patient has been assessed with a concern for Malnutrition and has been determined to have a diagnosis/diagnoses of Severe protein-calorie malnutrition and Underweight (BMI < 19).    This patient is being managed with:   Diet NPO with Tube Feed-  Tube Feeding Modality: Gastrostomy  Nepro with Carb Steady  Total Volume for 24 Hours (mL): 990  Continuous  Starting Tube Feed Rate {mL per Hour}: 40  Increase Tube Feed Rate by (mL): 5     Every 8 hours  Until Goal Tube Feed Rate (mL per Hour): 55  Tube Feed Duration (in Hours): 18  Tube Feed Start Time: 11:00  Liquid Protein Supplement     Qty per Day:  1  Entered: Oct  9 2024  8:15PM  
This patient has been assessed with a concern for Malnutrition and has been determined to have a diagnosis/diagnoses of Severe protein-calorie malnutrition and Underweight (BMI < 19).    This patient is being managed with:   Diet NPO with Tube Feed-  Tube Feeding Modality: Gastrostomy  Nepro with Carb Steady  Total Volume for 24 Hours (mL): 180  Continuous  Starting Tube Feed Rate {mL per Hour}: 10  Increase Tube Feed Rate by (mL): 0     Every 8 hours  Until Goal Tube Feed Rate (mL per Hour): 10  Tube Feed Duration (in Hours): 18  Tube Feed Start Time: 17:00  Tube Feed Stop Time: 11:00  Liquid Protein Supplement     Qty per Day:  1  Entered: Oct 12 2024  4:59PM

## 2024-11-07 NOTE — DISCHARGE NOTE PROVIDER - NSDCMRMEDTOKEN_GEN_ALL_CORE_FT
aspirin 81 mg oral tablet, chewable: 1 tab(s) orally once a day via peg  atorvastatin 20 mg oral tablet: 1 tab(s) orally once a day (at bedtime) via peg  ferrous sulfate 300 mg/5 mL (60 mg/5 mL elemental iron) oral liquid: 5 milliliter(s) orally once a day  hydrALAZINE 50 mg oral tablet: 1 tab(s) orally every 8 hours  insulin glargine 100 units/mL subcutaneous solution: 25 unit(s) subcutaneous once a day (at bedtime)  ipratropium-albuterol 0.5 mg-2.5 mg/3 mL inhalation solution: 3 milliliter(s) inhaled every 6 hours  Nephro-Kavon oral tablet: 1 tab(s) by gastrostomy tube once a day  NIFEdipine 90 mg oral tablet, extended release: 1 tab(s) by gastrostomy tube once a day  NovoLOG 100 units/mL subcutaneous solution: subcutaneous 3 times a day (before meals) sliding scale  pantoprazole 40 mg oral granule, delayed release: 40 milligram(s) orally once a day via PEG

## 2024-11-07 NOTE — PROGRESS NOTE ADULT - PROVIDER SPECIALTY LIST ADULT
Critical Care
Hospitalist
Infectious Disease
Internal Medicine
Internal Medicine
Nephrology
Pulmonology
Pulmonology
Critical Care
Hospitalist
Infectious Disease
Nephrology
Pulmonology
Pulmonology
Surgery
Surgery
Urology
Critical Care
Critical Care
Hospitalist
Hospitalist
Infectious Disease
Infectious Disease
Nephrology
Pulmonology
Surgery
Critical Care
Hospitalist
Infectious Disease
Infectious Disease
Nephrology
Surgery
Critical Care
Hospitalist
Pulmonology
Critical Care
Critical Care
Nephrology
Critical Care
Nephrology

## 2024-11-07 NOTE — DISCHARGE NOTE NURSING/CASE MANAGEMENT/SOCIAL WORK - PATIENT PORTAL LINK FT
You can access the FollowMyHealth Patient Portal offered by [facility] by registering at the following website: [email or web address]. By joining [employer]’s FollowMyHealth portal, you will also be able to view your health information using other applications (apps) compatible with our system.

## 2024-11-07 NOTE — DISCHARGE NOTE NURSING/CASE MANAGEMENT/SOCIAL WORK - NSDCPEFALRISK_GEN_ALL_CORE
For information on Fall & Injury Prevention, visit: [email or web address] OR  [email or web address] OR  https://www.cdc.gov/steadi/patient.html

## 2024-11-07 NOTE — PROGRESS NOTE ADULT - SUBJECTIVE AND OBJECTIVE BOX
INTERVAL HPI/OVERNIGHT EVENTS: No acute overnight events   SUBJECTIVE: Patient seen and examined at bedside.   ROS: All negative except as listed above.    VITAL SIGNS:  Vital Signs Last 24 Hrs  T(C): 36.4 (07 Nov 2024 05:15), Max: 37.9 (06 Nov 2024 10:10)  T(F): 97.5 (07 Nov 2024 05:15), Max: 100.2 (06 Nov 2024 10:10)  HR: 83 (07 Nov 2024 07:07) (77 - 114)  BP: 164/85 (07 Nov 2024 05:15) (122/65 - 164/85)  BP(mean): --  ABP: --  ABP(mean): --  RR: 18 (07 Nov 2024 05:15) (18 - 18)  SpO2: 99% (07 Nov 2024 07:07) (93% - 99%)    O2 Parameters below as of 07 Nov 2024 06:46  Patient On (Oxygen Delivery Method): ventilator          Mode: AC/ CMV (Assist Control/ Continuous Mandatory Ventilation), RR (machine): 16, TV (machine): 400, FiO2: 35, PEEP: 5, ITime: 1, MAP: 12, PIP: 30  Plateau pressure:   P/F ratio:     11-06 @ 07:01  -  11-07 @ 07:00  --------------------------------------------------------  IN: 680 mL / OUT: 0 mL / NET: 680 mL      CAPILLARY BLOOD GLUCOSE      POCT Blood Glucose.: 181 mg/dL (07 Nov 2024 05:07)      ECG: reviewed.    PHYSICAL EXAM:  General: well, no distress   HEENT: Sclera clear, EOMI, PERRL  Neck: + trache   Respiratory: scattered rhonchi , white sputum via trach   Cardiovascular: S1S2 RRR  Abdomen: soft, ND, NTTP, + BS , PEG   Extremities: no edema, THAI     MEDICATIONS:  MEDICATIONS  (STANDING):  albuterol/ipratropium for Nebulization 3 milliLiter(s) Nebulizer every 6 hours  chlorhexidine 0.12% Liquid 15 milliLiter(s) Oral Mucosa every 12 hours  chlorhexidine 2% Cloths 1 Application(s) Topical <User Schedule>  dextrose 5%. 1000 milliLiter(s) (50 mL/Hr) IV Continuous <Continuous>  dextrose 5%. 1000 milliLiter(s) (100 mL/Hr) IV Continuous <Continuous>  dextrose 50% Injectable 25 Gram(s) IV Push once  dextrose 50% Injectable 12.5 Gram(s) IV Push once  dextrose 50% Injectable 25 Gram(s) IV Push once  epoetin reilly-epbx (RETACRIT) Injectable 58161 Unit(s) IV Push <User Schedule>  ferrous    sulfate Liquid 300 milliGRAM(s) Enteral Tube daily  glucagon  Injectable 1 milliGRAM(s) IntraMuscular once  heparin   Injectable 5000 Unit(s) SubCutaneous every 12 hours  hydrALAZINE 50 milliGRAM(s) Oral every 8 hours  insulin glargine Injectable (LANTUS) 25 Unit(s) SubCutaneous at bedtime  insulin lispro (ADMELOG) corrective regimen sliding scale   SubCutaneous every 6 hours  lactobacillus acidophilus 1 Tablet(s) Oral daily  Nephro-noam 1 Tablet(s) Oral daily  nystatin Powder 1 Application(s) Topical every 8 hours  pantoprazole   Suspension 40 milliGRAM(s) Enteral Tube daily  sodium chloride 3%  Inhalation 4 milliLiter(s) Inhalation every 6 hours    MEDICATIONS  (PRN):  acetaminophen     Tablet .. 650 milliGRAM(s) Oral every 6 hours PRN Temp greater or equal to 38C (100.4F), Mild Pain (1 - 3)  dextrose Oral Gel 15 Gram(s) Oral once PRN Blood Glucose LESS THAN 70 milliGRAM(s)/deciliter      ALLERGIES:  Allergies    No Known Allergies    Intolerances        LABS:                        8.7    14.42 )-----------( 458      ( 06 Nov 2024 07:15 )             27.8     11-06    131[L]  |  97  |  57[H]  ----------------------------<  181[H]  3.7   |  24  |  3.72[H]    Ca    9.3      06 Nov 2024 07:15        Urinalysis Basic - ( 06 Nov 2024 07:15 )    Color: x / Appearance: x / SG: x / pH: x  Gluc: 181 mg/dL / Ketone: x  / Bili: x / Urobili: x   Blood: x / Protein: x / Nitrite: x   Leuk Esterase: x / RBC: x / WBC x   Sq Epi: x / Non Sq Epi: x / Bacteria: x      ABG:      vBG:    Micro:    Culture - Blood (collected 11-01-24 @ 19:05)  Source: .Blood BLOOD  Final Report (11-07-24 @ 01:00):    No growth at 5 days    Culture - Blood (collected 10-28-24 @ 15:00)  Source: .Blood BLOOD  Final Report (11-03-24 @ 01:18):    No growth at 5 days    Culture - Blood (collected 10-28-24 @ 15:00)  Source: .Blood BLOOD  Final Report (11-03-24 @ 01:18):    No growth at 5 days    Culture - Blood (collected 10-25-24 @ 16:55)  Source: .Blood BLOOD  Final Report (10-30-24 @ 23:07):    No growth at 5 days    Culture - Blood (collected 10-25-24 @ 16:55)  Source: .Blood BLOOD  Final Report (10-30-24 @ 23:07):    No growth at 5 days    Culture - Blood (collected 10-18-24 @ 11:10)  Source: .Blood BLOOD  Final Report (10-23-24 @ 15:01):    No growth at 5 days        Culture - Sputum (collected 11-02-24 @ 06:50)  Source: Trach Asp Tracheal Aspirate  Gram Stain (prelim) (11-03-24 @ 09:47):    Rare polymorphonuclear leukocytes per low power field    Few Squamous epithelial cells per low power field    Few Gram Negative Rods per oil power field    Rare Gram positive cocci in pairs per oil power field    Rare Yeast like cells per oil power field  Preliminary Report (11-06-24 @ 12:39):    Moderate Pseudomonas aeruginosa    Moderate Stenotrophomonas maltophilia    Commensal dominick consistent with body site  Organism: Stenotrophomonas maltophilia (11-06-24 @ 12:35)      Method Type: KB      -  Minocycline: I  Organism: Pseudomonas aeruginosa  Stenotrophomonas maltophilia (11-06-24 @ 12:34)  Organism: Stenotrophomonas maltophilia (11-06-24 @ 12:34)      Method Type: VIDA      -  Levofloxacin: R >4      -  Trimethoprim/Sulfamethoxazole: R >2/38  Organism: Pseudomonas aeruginosa (11-04-24 @ 11:19)      Method Type: VIDA      -  Aztreonam: S 8      -  Cefepime: S 8      -  Ceftazidime: R >16      -  Ciprofloxacin: R 2      -  Imipenem: S <=1      -  Levofloxacin: R >4      -  Meropenem: S <=1      -  Piperacillin/Tazobactam: S 16    Culture - Sputum (collected 10-25-24 @ 17:30)  Source: Trach Asp Tracheal Aspirate  Gram Stain (10-27-24 @ 17:35):    Few polymorphonuclear leukocytes per low power field    Rare Squamous epithelial cells per low power field    Few Gram Negative Rods per oil power field  Final Report (10-27-24 @ 17:35):    Moderate Pseudomonas aeruginosa (Carbapenem Resistant)    Commensal dominick consistent with body site  Organism: Pseudomonas aeruginosa (Carbapenem Resistant) (10-27-24 @ 17:35)  Organism: Pseudomonas aeruginosa (Carbapenem Resistant) (10-27-24 @ 17:35)      Method Type: CarbaR      -  Resistance Gene to Carbapenem: Nondet  Organism: Pseudomonas aeruginosa (Carbapenem Resistant) (10-27-24 @ 17:35)      Method Type: VIDA      -  Aztreonam: R >16      -  Cefepime: I 16      -  Ceftazidime: R >16      -  Ceftazidime/Avibactam: S <=4      -  Ceftolozane/tazobactam: S 4      -  Ciprofloxacin: R >2      -  Imipenem: R 8      -  Levofloxacin: R >4      -  Meropenem: I 4      -  Piperacillin/Tazobactam: R >64        RADIOLOGY & ADDITIONAL TESTS: Reviewed.

## 2024-11-07 NOTE — DISCHARGE NOTE PROVIDER - ATTENDING DISCHARGE PHYSICAL EXAMINATION:
Vital Signs Last 24 Hrs  T(F): 97.6 (07 Nov 2024 10:58), Max: 99.7 (06 Nov 2024 17:45)  HR: 90 (07 Nov 2024 11:40) (77 - 114)  BP: 169/78 (07 Nov 2024 10:58) (122/65 - 169/78)  RR: 18 (07 Nov 2024 10:58) (18 - 18)  SpO2: 99% (07 Nov 2024 11:40) (93% - 100%)    Physical Exam:  Constitutional: NAD, awake and alert, well-developed  Neck: Soft and supple, No LAD, No JVD  Respiratory: cta b/l no wheezing no rhonchi  Cardiovascular: +s1/s2 no edema b/l le  Gastrointestinal: soft nt nd bs+  Vascular: 2+ peripheral pulses  Neurological: A/O x 3, no focal deficits

## 2024-11-07 NOTE — PROGRESS NOTE ADULT - ASSESSMENT
ESRD:  On HD TIW via an A-V fistula.  - HD on TTS schedule, via A-V fistula.   - Placement at [facility].  - Limit UF due to Hypotension.  - Midodrine as needed.      Anemia:  - Transfuse for Hg < 7.  - Continue EPO.    Respiratory Failure:  Failed extubation.  - s/p Tracheostomy.    Otf Mena MD  Nephrology

## 2024-11-07 NOTE — PROGRESS NOTE ADULT - REASON FOR ADMISSION
Sepsis and acute hypoxic respiratory failure secondary to suspected aspiration PNA vs. HCAP

## 2024-11-07 NOTE — DISCHARGE NOTE PROVIDER - HOSPITAL COURSE
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 The patient was admitted w/ Septic shock POA due to RLL PNA with course complicated by acute hypoxic respiratory failure ultimately requiring admission to the ICU for intubation on 10/8. The pt failed trial of extubation and was subsequently reintubated on 10/14 & trached on 10/23. Pt was downgraded to the medical floors on 10/24. Patient admitted for multifocal pneumonia s/p treatment for antibiotics. Patient is medically optimized for discharge to NH.

## 2024-11-08 ENCOUNTER — NON-APPOINTMENT (OUTPATIENT)
Age: 74
End: 2024-11-08

## 2024-11-11 DIAGNOSIS — I16.0 HYPERTENSIVE URGENCY: ICD-10-CM
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
## 2024-11-11 DIAGNOSIS — J15.1 PNEUMONIA DUE TO PSEUDOMONAS: ICD-10-CM

## 2024-11-11 DIAGNOSIS — R65.21 SEVERE SEPSIS WITH SEPTIC SHOCK: ICD-10-CM

## 2024-11-11 DIAGNOSIS — J96.01 ACUTE RESPIRATORY FAILURE WITH HYPOXIA: ICD-10-CM

## 2024-11-11 DIAGNOSIS — I95.9 HYPOTENSION, UNSPECIFIED: ICD-10-CM

## 2024-11-11 DIAGNOSIS — I69.391 DYSPHAGIA FOLLOWING CEREBRAL INFARCTION: ICD-10-CM

## 2024-11-11 DIAGNOSIS — Z79.4 LONG TERM (CURRENT) USE OF INSULIN: ICD-10-CM

## 2024-11-11 DIAGNOSIS — E87.1 HYPO-OSMOLALITY AND HYPONATREMIA: ICD-10-CM

## 2024-11-11 DIAGNOSIS — E87.20 ACIDOSIS, UNSPECIFIED: ICD-10-CM

## 2024-11-11 DIAGNOSIS — E11.22 TYPE 2 DIABETES MELLITUS WITH DIABETIC CHRONIC KIDNEY DISEASE: ICD-10-CM

## 2024-11-11 DIAGNOSIS — T17.590A OTHER FOREIGN OBJECT IN BRONCHUS CAUSING ASPHYXIATION, INITIAL ENCOUNTER: ICD-10-CM

## 2024-11-11 DIAGNOSIS — Z86.718 PERSONAL HISTORY OF OTHER VENOUS THROMBOSIS AND EMBOLISM: ICD-10-CM

## 2024-11-11 DIAGNOSIS — I69.351 HEMIPLEGIA AND HEMIPARESIS FOLLOWING CEREBRAL INFARCTION AFFECTING RIGHT DOMINANT SIDE: ICD-10-CM

## 2024-11-11 DIAGNOSIS — A41.9 SEPSIS, UNSPECIFIED ORGANISM: ICD-10-CM

## 2024-11-11 DIAGNOSIS — J69.0 PNEUMONITIS DUE TO INHALATION OF FOOD AND VOMIT: ICD-10-CM

## 2024-11-11 DIAGNOSIS — K59.00 CONSTIPATION, UNSPECIFIED: ICD-10-CM

## 2024-11-11 DIAGNOSIS — Z99.2 DEPENDENCE ON RENAL DIALYSIS: ICD-10-CM

## 2024-11-11 DIAGNOSIS — I12.0 HYPERTENSIVE CHRONIC KIDNEY DISEASE WITH STAGE 5 CHRONIC KIDNEY DISEASE OR END STAGE RENAL DISEASE: ICD-10-CM

## 2024-11-11 DIAGNOSIS — E78.5 HYPERLIPIDEMIA, UNSPECIFIED: ICD-10-CM

## 2024-11-11 DIAGNOSIS — N18.6 END STAGE RENAL DISEASE: ICD-10-CM

## 2024-11-11 DIAGNOSIS — E43 UNSPECIFIED SEVERE PROTEIN-CALORIE MALNUTRITION: ICD-10-CM

## 2024-11-11 DIAGNOSIS — A41.52 SEPSIS DUE TO PSEUDOMONAS: ICD-10-CM

## 2024-11-11 DIAGNOSIS — E83.39 OTHER DISORDERS OF PHOSPHORUS METABOLISM: ICD-10-CM

## 2024-11-11 DIAGNOSIS — Z79.82 LONG TERM (CURRENT) USE OF ASPIRIN: ICD-10-CM

## 2024-11-11 DIAGNOSIS — Z79.01 LONG TERM (CURRENT) USE OF ANTICOAGULANTS: ICD-10-CM

## 2024-11-11 DIAGNOSIS — Z99.3 DEPENDENCE ON WHEELCHAIR: ICD-10-CM

## 2024-11-11 DIAGNOSIS — D63.1 ANEMIA IN CHRONIC KIDNEY DISEASE: ICD-10-CM

## 2024-11-11 DIAGNOSIS — Z93.1 GASTROSTOMY STATUS: ICD-10-CM

## 2024-11-14 LAB — MISCELLANEOUS TEST NAME: SIGNIFICANT CHANGE UP

## 2024-11-15 ENCOUNTER — TRANSCRIPTION ENCOUNTER (OUTPATIENT)
Age: 74
End: 2024-11-15

## 2024-11-18 NOTE — ED ADULT TRIAGE NOTE - DOMESTIC TRAVEL HIGH RISK QUESTION
Please get your labs done at any [facility] facility that has a laboratory, you do not need an appointment.     I will communicate your laboratory and/or imaging results with you through your [employer] account that was set up through the portal, it was a pleasure meeting and taking care of you today.      [facility] - [address] [address]     No

## 2024-11-20 ENCOUNTER — TRANSCRIPTION ENCOUNTER (OUTPATIENT)
Age: 74
End: 2024-11-20

## 2024-11-23 LAB
CULTURE RESULTS: SIGNIFICANT CHANGE UP
NIGHT BLUE STAIN TISS: SIGNIFICANT CHANGE UP
SPECIMEN SOURCE: SIGNIFICANT CHANGE UP

## 2024-11-29 ENCOUNTER — APPOINTMENT (OUTPATIENT)
Dept: HOME HEALTH SERVICES | Facility: HOME HEALTH | Age: 74
End: 2024-11-29

## 2024-11-29 ENCOUNTER — NON-APPOINTMENT (OUTPATIENT)
Age: 74
End: 2024-11-29

## 2024-12-04 ENCOUNTER — TRANSCRIPTION ENCOUNTER (OUTPATIENT)
Age: 74
End: 2024-12-04

## 2024-12-13 ENCOUNTER — TRANSCRIPTION ENCOUNTER (OUTPATIENT)
Age: 74
End: 2024-12-13

## 2024-12-17 NOTE — DIETITIAN INITIAL EVALUATION ADULT - POUNDS LOST/GAINED
Give albuterol nebulizer treatments every 4 hours as needed.  I would give at least 3 to 4 albuterol treatments a day until his symptoms improve.  Start the oral steroids tomorrow morning and give daily.  If he starts having breathing difficulty again or if his fevers worsen then please return to the ER.    
14

## 2024-12-26 ENCOUNTER — NON-APPOINTMENT (OUTPATIENT)
Age: 74
End: 2024-12-26

## 2025-02-11 PROBLEM — Z86.718 PERSONAL HISTORY OF OTHER VENOUS THROMBOSIS AND EMBOLISM: Chronic | Status: ACTIVE | Noted: 2024-10-04

## 2025-02-11 PROBLEM — I10 ESSENTIAL (PRIMARY) HYPERTENSION: Chronic | Status: ACTIVE | Noted: 2024-10-04

## 2025-02-11 PROBLEM — E11.9 TYPE 2 DIABETES MELLITUS WITHOUT COMPLICATIONS: Chronic | Status: ACTIVE | Noted: 2024-10-04

## 2025-02-11 PROBLEM — I69.30 UNSPECIFIED SEQUELAE OF CEREBRAL INFARCTION: Chronic | Status: ACTIVE | Noted: 2024-10-04

## 2025-02-12 ENCOUNTER — APPOINTMENT (OUTPATIENT)
Dept: VASCULAR SURGERY | Facility: CLINIC | Age: 75
End: 2025-02-12
Payer: MEDICARE

## 2025-02-12 VITALS — DIASTOLIC BLOOD PRESSURE: 73 MMHG | SYSTOLIC BLOOD PRESSURE: 163 MMHG | HEART RATE: 96 BPM

## 2025-02-12 DIAGNOSIS — Z99.2 END STAGE RENAL DISEASE: ICD-10-CM

## 2025-02-12 DIAGNOSIS — N18.6 END STAGE RENAL DISEASE: ICD-10-CM

## 2025-02-12 PROCEDURE — 99213 OFFICE O/P EST LOW 20 MIN: CPT

## 2025-02-18 ENCOUNTER — INPATIENT (INPATIENT)
Facility: HOSPITAL | Age: 75
LOS: 28 days | Discharge: NURSING, REHAB OR LONG-TERM CARE | End: 2025-03-19
Attending: EMERGENCY MEDICINE | Admitting: EMERGENCY MEDICINE
Payer: MEDICARE

## 2025-02-18 ENCOUNTER — OUTPATIENT (OUTPATIENT)
Dept: OUTPATIENT SERVICES | Facility: HOSPITAL | Age: 75
LOS: 1 days | Discharge: STILL ADMITTED OR ADMINISTRATIVE | End: 2025-02-18
Payer: MEDICARE

## 2025-02-18 ENCOUNTER — APPOINTMENT (OUTPATIENT)
Dept: VASCULAR SURGERY | Facility: AMBULATORY SURGERY CENTER | Age: 75
End: 2025-02-18

## 2025-02-18 VITALS
DIASTOLIC BLOOD PRESSURE: 67 MMHG | HEIGHT: 64 IN | TEMPERATURE: 99 F | HEART RATE: 69 BPM | SYSTOLIC BLOOD PRESSURE: 163 MMHG | OXYGEN SATURATION: 100 % | RESPIRATION RATE: 18 BRPM

## 2025-02-18 VITALS — TEMPERATURE: 98 F | OXYGEN SATURATION: 100 % | HEIGHT: 64 IN | WEIGHT: 119.05 LBS

## 2025-02-18 VITALS
HEART RATE: 65 BPM | DIASTOLIC BLOOD PRESSURE: 68 MMHG | SYSTOLIC BLOOD PRESSURE: 154 MMHG | RESPIRATION RATE: 16 BRPM | OXYGEN SATURATION: 100 %

## 2025-02-18 DIAGNOSIS — Z98.890 OTHER SPECIFIED POSTPROCEDURAL STATES: Chronic | ICD-10-CM

## 2025-02-18 DIAGNOSIS — I77.0 ARTERIOVENOUS FISTULA, ACQUIRED: Chronic | ICD-10-CM

## 2025-02-18 DIAGNOSIS — Z93.1 GASTROSTOMY STATUS: Chronic | ICD-10-CM

## 2025-02-18 DIAGNOSIS — N18.6 END STAGE RENAL DISEASE: ICD-10-CM

## 2025-02-18 DIAGNOSIS — N39.0 URINARY TRACT INFECTION, SITE NOT SPECIFIED: ICD-10-CM

## 2025-02-18 LAB
ALBUMIN SERPL ELPH-MCNC: 3.4 G/DL — SIGNIFICANT CHANGE UP (ref 3.3–5)
ALP SERPL-CCNC: 260 U/L — HIGH (ref 40–120)
ALT FLD-CCNC: 13 U/L — SIGNIFICANT CHANGE UP (ref 4–41)
ANION GAP SERPL CALC-SCNC: 14 MMOL/L — SIGNIFICANT CHANGE UP (ref 7–14)
ANION GAP SERPL CALC-SCNC: 16 MMOL/L — HIGH (ref 7–14)
AST SERPL-CCNC: 20 U/L — SIGNIFICANT CHANGE UP (ref 4–40)
BACTERIA # UR AUTO: ABNORMAL /HPF
BASOPHILS # BLD AUTO: 0.06 K/UL — SIGNIFICANT CHANGE UP (ref 0–0.2)
BASOPHILS NFR BLD AUTO: 0.3 % — SIGNIFICANT CHANGE UP (ref 0–2)
BILIRUB SERPL-MCNC: 0.5 MG/DL — SIGNIFICANT CHANGE UP (ref 0.2–1.2)
BILIRUB UR-MCNC: NEGATIVE — SIGNIFICANT CHANGE UP
BLOOD GAS VENOUS COMPREHENSIVE RESULT: SIGNIFICANT CHANGE UP
BUN SERPL-MCNC: 84 MG/DL — HIGH (ref 7–23)
BUN SERPL-MCNC: 90 MG/DL — HIGH (ref 7–23)
CALCIUM SERPL-MCNC: 10.4 MG/DL — SIGNIFICANT CHANGE UP (ref 8.4–10.5)
CALCIUM SERPL-MCNC: 9.6 MG/DL — SIGNIFICANT CHANGE UP (ref 8.4–10.5)
CAST: 0 /LPF — SIGNIFICANT CHANGE UP (ref 0–4)
CHLORIDE SERPL-SCNC: 86 MMOL/L — LOW (ref 98–107)
CHLORIDE SERPL-SCNC: 88 MMOL/L — LOW (ref 98–107)
CO2 SERPL-SCNC: 26 MMOL/L — SIGNIFICANT CHANGE UP (ref 22–31)
CO2 SERPL-SCNC: 30 MMOL/L — SIGNIFICANT CHANGE UP (ref 22–31)
COLOR SPEC: SIGNIFICANT CHANGE UP
CREAT SERPL-MCNC: 2.79 MG/DL — HIGH (ref 0.5–1.3)
CREAT SERPL-MCNC: 3.08 MG/DL — HIGH (ref 0.5–1.3)
EGFR: 21 ML/MIN/1.73M2 — LOW
EGFR: 21 ML/MIN/1.73M2 — LOW
EGFR: 23 ML/MIN/1.73M2 — LOW
EOSINOPHIL # BLD AUTO: 0.41 K/UL — SIGNIFICANT CHANGE UP (ref 0–0.5)
EOSINOPHIL NFR BLD AUTO: 2.2 % — SIGNIFICANT CHANGE UP (ref 0–6)
FLUAV AG NPH QL: SIGNIFICANT CHANGE UP
FLUBV AG NPH QL: SIGNIFICANT CHANGE UP
GLUCOSE BLDC GLUCOMTR-MCNC: 316 MG/DL — HIGH (ref 70–99)
GLUCOSE BLDC GLUCOMTR-MCNC: 340 MG/DL — HIGH (ref 70–99)
GLUCOSE BLDC GLUCOMTR-MCNC: 94 MG/DL — SIGNIFICANT CHANGE UP (ref 70–99)
GLUCOSE SERPL-MCNC: 312 MG/DL — HIGH (ref 70–99)
GLUCOSE SERPL-MCNC: 59 MG/DL — LOW (ref 70–99)
GLUCOSE UR QL: 250 MG/DL
HCT VFR BLD CALC: 23 % — LOW (ref 39–50)
HCT VFR BLD CALC: 24.6 % — LOW (ref 39–50)
HGB BLD-MCNC: 7.2 G/DL — LOW (ref 13–17)
HGB BLD-MCNC: 7.3 G/DL — LOW (ref 13–17)
IANC: 15.74 K/UL — HIGH (ref 1.8–7.4)
IMM GRANULOCYTES NFR BLD AUTO: 1 % — HIGH (ref 0–0.9)
KETONES UR-MCNC: NEGATIVE MG/DL — SIGNIFICANT CHANGE UP
LEUKOCYTE ESTERASE UR-ACNC: ABNORMAL
LYMPHOCYTES # BLD AUTO: 1.14 K/UL — SIGNIFICANT CHANGE UP (ref 1–3.3)
LYMPHOCYTES # BLD AUTO: 6.1 % — LOW (ref 13–44)
MCHC RBC-ENTMCNC: 23.1 PG — LOW (ref 27–34)
MCHC RBC-ENTMCNC: 24.1 PG — LOW (ref 27–34)
MCHC RBC-ENTMCNC: 29.7 G/DL — LOW (ref 32–36)
MCHC RBC-ENTMCNC: 31.3 G/DL — LOW (ref 32–36)
MCV RBC AUTO: 76.9 FL — LOW (ref 80–100)
MCV RBC AUTO: 77.8 FL — LOW (ref 80–100)
MONOCYTES # BLD AUTO: 1.05 K/UL — HIGH (ref 0–0.9)
MONOCYTES NFR BLD AUTO: 5.7 % — SIGNIFICANT CHANGE UP (ref 2–14)
NEUTROPHILS # BLD AUTO: 15.74 K/UL — HIGH (ref 1.8–7.4)
NEUTROPHILS NFR BLD AUTO: 84.7 % — HIGH (ref 43–77)
NITRITE UR-MCNC: NEGATIVE — SIGNIFICANT CHANGE UP
NRBC # BLD AUTO: 0.14 K/UL — HIGH (ref 0–0)
NRBC # FLD: 0.11 K/UL — HIGH (ref 0–0)
NRBC # FLD: 0.14 K/UL — HIGH (ref 0–0)
NRBC BLD AUTO-RTO: 0 /100 WBCS — SIGNIFICANT CHANGE UP (ref 0–0)
NRBC BLD AUTO-RTO: 0 /100 WBCS — SIGNIFICANT CHANGE UP (ref 0–0)
PH UR: 5.5 — SIGNIFICANT CHANGE UP (ref 5–8)
PLATELET # BLD AUTO: 212 K/UL — SIGNIFICANT CHANGE UP (ref 150–400)
PLATELET # BLD AUTO: 218 K/UL — SIGNIFICANT CHANGE UP (ref 150–400)
POTASSIUM SERPL-MCNC: 3.4 MMOL/L — LOW (ref 3.5–5.3)
POTASSIUM SERPL-MCNC: 3.8 MMOL/L — SIGNIFICANT CHANGE UP (ref 3.5–5.3)
POTASSIUM SERPL-SCNC: 3.4 MMOL/L — LOW (ref 3.5–5.3)
POTASSIUM SERPL-SCNC: 3.8 MMOL/L — SIGNIFICANT CHANGE UP (ref 3.5–5.3)
PROT SERPL-MCNC: 7.7 G/DL — SIGNIFICANT CHANGE UP (ref 6–8.3)
PROT UR-MCNC: 300 MG/DL
RBC # BLD: 2.99 M/UL — LOW (ref 4.2–5.8)
RBC # BLD: 3.16 M/UL — LOW (ref 4.2–5.8)
RBC # FLD: 21.3 % — HIGH (ref 10.3–14.5)
RBC # FLD: 21.3 % — HIGH (ref 10.3–14.5)
RBC CASTS # UR COMP ASSIST: 3 /HPF — SIGNIFICANT CHANGE UP (ref 0–4)
REVIEW: SIGNIFICANT CHANGE UP
RSV RNA NPH QL NAA+NON-PROBE: SIGNIFICANT CHANGE UP
SARS-COV-2 RNA SPEC QL NAA+PROBE: SIGNIFICANT CHANGE UP
SODIUM SERPL-SCNC: 128 MMOL/L — LOW (ref 135–145)
SODIUM SERPL-SCNC: 132 MMOL/L — LOW (ref 135–145)
SP GR SPEC: 1.02 — SIGNIFICANT CHANGE UP (ref 1–1.03)
SQUAMOUS # UR AUTO: 8 /HPF — HIGH (ref 0–5)
UROBILINOGEN FLD QL: 1 MG/DL — SIGNIFICANT CHANGE UP (ref 0.2–1)
WBC # BLD: 18.58 K/UL — HIGH (ref 3.8–10.5)
WBC # BLD: 18.6 K/UL — HIGH (ref 3.8–10.5)
WBC # FLD AUTO: 18.6 K/UL — HIGH (ref 3.8–10.5)
WBC UR QL: 6109 /HPF — HIGH (ref 0–5)

## 2025-02-18 PROCEDURE — 99285 EMERGENCY DEPT VISIT HI MDM: CPT | Mod: FS

## 2025-02-18 PROCEDURE — 99233 SBSQ HOSP IP/OBS HIGH 50: CPT | Mod: FS,GC

## 2025-02-18 PROCEDURE — 36902 INTRO CATH DIALYSIS CIRCUIT: CPT | Mod: RT

## 2025-02-18 PROCEDURE — 71045 X-RAY EXAM CHEST 1 VIEW: CPT | Mod: 26

## 2025-02-18 PROCEDURE — 76937 US GUIDE VASCULAR ACCESS: CPT | Mod: 26

## 2025-02-18 PROCEDURE — 93010 ELECTROCARDIOGRAM REPORT: CPT

## 2025-02-18 RX ORDER — FERROUS SULFATE 137(45) MG
220 TABLET, EXTENDED RELEASE ORAL
Refills: 0 | DISCHARGE

## 2025-02-18 RX ORDER — DEXTROSE 50 % IN WATER 50 %
25 SYRINGE (ML) INTRAVENOUS ONCE
Refills: 0 | Status: DISCONTINUED | OUTPATIENT
Start: 2025-02-18 | End: 2025-03-19

## 2025-02-18 RX ORDER — SODIUM CHLORIDE 9 G/1000ML
1000 INJECTION, SOLUTION INTRAVENOUS
Refills: 0 | Status: DISCONTINUED | OUTPATIENT
Start: 2025-02-18 | End: 2025-03-19

## 2025-02-18 RX ORDER — INSULIN LISPRO 100 U/ML
3 INJECTION, SOLUTION INTRAVENOUS; SUBCUTANEOUS ONCE
Refills: 0 | Status: COMPLETED | OUTPATIENT
Start: 2025-02-18 | End: 2025-02-18

## 2025-02-18 RX ORDER — MIDODRINE HYDROCHLORIDE 5 MG/1
1 TABLET ORAL
Refills: 0 | DISCHARGE

## 2025-02-18 RX ORDER — INSULIN GLARGINE-YFGN 100 [IU]/ML
25 INJECTION, SOLUTION SUBCUTANEOUS AT BEDTIME
Refills: 0 | Status: DISCONTINUED | OUTPATIENT
Start: 2025-02-18 | End: 2025-02-21

## 2025-02-18 RX ORDER — NIFEDIPINE 30 MG
90 TABLET, EXTENDED RELEASE 24 HR ORAL DAILY
Refills: 0 | Status: DISCONTINUED | OUTPATIENT
Start: 2025-02-18 | End: 2025-02-18

## 2025-02-18 RX ORDER — ASPIRIN 325 MG
81 TABLET ORAL DAILY
Refills: 0 | Status: DISCONTINUED | OUTPATIENT
Start: 2025-02-18 | End: 2025-02-18

## 2025-02-18 RX ORDER — CEFTRIAXONE 500 MG/1
1000 INJECTION, POWDER, FOR SOLUTION INTRAMUSCULAR; INTRAVENOUS ONCE
Refills: 0 | Status: DISCONTINUED | OUTPATIENT
Start: 2025-02-19 | End: 2025-02-19

## 2025-02-18 RX ORDER — ACETAMINOPHEN 500 MG/5ML
2 LIQUID (ML) ORAL
Refills: 0 | DISCHARGE

## 2025-02-18 RX ORDER — CEFTRIAXONE 500 MG/1
1000 INJECTION, POWDER, FOR SOLUTION INTRAMUSCULAR; INTRAVENOUS ONCE
Refills: 0 | Status: COMPLETED | OUTPATIENT
Start: 2025-02-18 | End: 2025-02-18

## 2025-02-18 RX ORDER — INSULIN LISPRO 100 U/ML
2 INJECTION, SOLUTION INTRAVENOUS; SUBCUTANEOUS ONCE
Refills: 0 | Status: COMPLETED | OUTPATIENT
Start: 2025-02-18 | End: 2025-02-18

## 2025-02-18 RX ORDER — INSULIN LISPRO 100 U/ML
INJECTION, SOLUTION INTRAVENOUS; SUBCUTANEOUS EVERY 6 HOURS
Refills: 0 | Status: DISCONTINUED | OUTPATIENT
Start: 2025-02-18 | End: 2025-03-19

## 2025-02-18 RX ORDER — ATORVASTATIN CALCIUM 80 MG/1
20 TABLET, FILM COATED ORAL AT BEDTIME
Refills: 0 | Status: DISCONTINUED | OUTPATIENT
Start: 2025-02-18 | End: 2025-03-19

## 2025-02-18 RX ORDER — DEXTROSE 50 % IN WATER 50 %
12.5 SYRINGE (ML) INTRAVENOUS ONCE
Refills: 0 | Status: DISCONTINUED | OUTPATIENT
Start: 2025-02-18 | End: 2025-03-19

## 2025-02-18 RX ORDER — DEXTROSE 50 % IN WATER 50 %
15 SYRINGE (ML) INTRAVENOUS ONCE
Refills: 0 | Status: DISCONTINUED | OUTPATIENT
Start: 2025-02-18 | End: 2025-03-19

## 2025-02-18 RX ORDER — GLUCAGON 3 MG/1
1 POWDER NASAL ONCE
Refills: 0 | Status: DISCONTINUED | OUTPATIENT
Start: 2025-02-18 | End: 2025-03-19

## 2025-02-18 RX ORDER — ATORVASTATIN CALCIUM 80 MG/1
20 TABLET, FILM COATED ORAL AT BEDTIME
Refills: 0 | Status: DISCONTINUED | OUTPATIENT
Start: 2025-02-18 | End: 2025-02-18

## 2025-02-18 RX ORDER — AMLODIPINE BESYLATE 10 MG/1
1 TABLET ORAL
Refills: 0 | DISCHARGE

## 2025-02-18 RX ORDER — OMEPRAZOLE 20 MG/1
1 CAPSULE, DELAYED RELEASE ORAL
Refills: 0 | DISCHARGE

## 2025-02-18 RX ORDER — INSULIN LISPRO 100 U/ML
0 INJECTION, SOLUTION INTRAVENOUS; SUBCUTANEOUS
Refills: 0 | DISCHARGE

## 2025-02-18 RX ORDER — DOCUSATE SODIUM 100 MG
15 CAPSULE ORAL
Refills: 0 | DISCHARGE

## 2025-02-18 RX ADMIN — INSULIN LISPRO 3 UNIT(S): 100 INJECTION, SOLUTION INTRAVENOUS; SUBCUTANEOUS at 08:20

## 2025-02-18 RX ADMIN — INSULIN LISPRO 2 UNIT(S): 100 INJECTION, SOLUTION INTRAVENOUS; SUBCUTANEOUS at 09:27

## 2025-02-18 RX ADMIN — CEFTRIAXONE 100 MILLIGRAM(S): 500 INJECTION, POWDER, FOR SOLUTION INTRAMUSCULAR; INTRAVENOUS at 17:45

## 2025-02-18 NOTE — CONSULT NOTE ADULT - SUBJECTIVE AND OBJECTIVE BOX
C SURGERY CONSULT NOTE    Patient: JIMMY BLAND , 74y ([birth year])Male   MRN: [id number]  Location: [place] [address]  Visit: 02-18-25 Outpatient  Date: 02-18-25 @ 11:14      HPI:  This is a 73y/o M with PMHx of HTN, IDDM2, CVA x2 ( with R. sided weakness and dysphagia s/p PEG tube), bedbound with multiple pressure ulcers, hx of RLE DVT (s/p apixaban course), recent admission for respiratory failure s/p tracheostomy (10/23/24), ESRD on HD (MWF) now s/p RUE fistulogram with angioplasty. Pre-procedure lab work was significant for leukocytosis to 18.6, H/H 7.2/23.0, and hyperglycemia in 300s.     For further work-up, patient was sent to ED for further evaluation.    PAST MEDICAL HISTORY:  Diabetes    Stage 5 chronic kidney disease not on chronic dialysis    Benign essential HTN    HLD (hyperlipidemia)    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    ESRD on hemodialysis    CVA (cerebrovascular accident)    HTN (hypertension)    Insulin dependent type 2 diabetes mellitus    History of cerebrovascular accident (CVA) with residual deficit    History of DVT of lower extremity    HLD (hyperlipidemia)    CVA (cerebrovascular accident)    Aphasia    Tracheostomy in place    PEG (percutaneous endoscopic gastrostomy) status    Presence of suprapubic catheter    GERD (gastroesophageal reflux disease)    Constipation    Pressure ulcer        PAST SURGICAL HISTORY:  No significant past surgical history    AVF (arteriovenous fistula)    Arteriovenous fistula    S/P percutaneous endoscopic gastrostomy (PEG) tube placement    History of tracheostomy        MEDICATIONS:  sodium chloride 0.9% lock flush 3 milliLiter(s) IV Push every 8 hours      ALLERGIES:  No Known Allergies      VITALS & I/Os:  Vital Signs Last 24 Hrs  T(C): 36.7 (18 Feb 2025 08:20), Max: 36.7 (18 Feb 2025 08:20)  T(F): 98.1 (18 Feb 2025 08:20), Max: 98.1 (18 Feb 2025 08:20)  HR: 63 (18 Feb 2025 08:35) (63 - 63)  BP: --  BP(mean): --  RR: --  SpO2: 100% (18 Feb 2025 08:35) (100% - 100%)    Parameters below as of 18 Feb 2025 08:20  Patient On (Oxygen Delivery Method): ventilator      Mode: AC/ CMV (Assist Control/ Continuous Mandatory Ventilation)  RR (machine): 15  TV (machine): 400  FiO2: 40  PEEP: 5  ITime: 0.8  PIP: 23    I&O's Summary      PHYSICAL EXAM:  General: No acute distress  Respiratory: Nonlabored, trach in place  RUE: palpable radial pulse, aneurysmal AVF with palpable thrill, contracted    LABS:                        7.2    18.60 )-----------( 212      ( 18 Feb 2025 08:34 )             23.0     02-18    128[L]  |  86[L]  |  84[H]  ----------------------------<  312[H]  3.8   |  26  |  2.79[H]    Ca    9.6      18 Feb 2025 08:34      Lactate:              Urinalysis Basic - ( 18 Feb 2025 08:34 )    Color: x / Appearance: x / SG: x / pH: x  Gluc: 312 mg/dL / Ketone: x  / Bili: x / Urobili: x   Blood: x / Protein: x / Nitrite: x   Leuk Esterase: x / RBC: x / WBC x   Sq Epi: x / Non Sq Epi: x / Bacteria: x

## 2025-02-18 NOTE — ED ADULT NURSE REASSESSMENT NOTE - NS ED NURSE REASSESS COMMENT FT1
Pt awake and alert, baseline mentation, resp even and unlabored, resting comfortably. Remains on cardiac monitor, , NSR. 3 unstagable pressure ulcer noted to L buttock, R buttock & sacrum. Cleaned, dried & Mepilex placed. Pt suctioned for comfort. NO acute distress noted. Report given to RCU RN at this time, awaiting transport to floor.
Breakl RN: Pt received in stretcher in room 20. Pt A&Ox0, non-verbal, eye opens to physical stimuli. resting comfortably in no apparent distress. Trach to vent, sating at 100%. NSR on monitor. Pending lab and xray result

## 2025-02-18 NOTE — ED PROVIDER NOTE - CLINICAL SUMMARY MEDICAL DECISION MAKING FREE TEXT BOX
74-year-old male with with a trach and vent, CVA, COPD, stage renal disease on dialysis sent to ER by vascular team after fistulagram for elevated WBC count  -will require infectious work up in ER  -labs blood cx vbg ua ucx flu/covid  -CXR  -reassess

## 2025-02-18 NOTE — H&P CARDIOLOGY - GASTROINTESTINAL DETAILS
+ PEG tube/soft/nontender/no distention + PEG tube. The skin area around PEG tube - no erythema or discharge./soft/nontender/no distention

## 2025-02-18 NOTE — ASU DISCHARGE PLAN (ADULT/PEDIATRIC) - FINANCIAL ASSISTANCE
[facility] provides services at a reduced cost to those who are determined to be eligible through [facility]’s financial assistance program. Information regarding [facility]’s financial assistance program can be found by going to [email or web address] or by calling [phone].

## 2025-02-18 NOTE — ASU DISCHARGE PLAN (ADULT/PEDIATRIC) - ASU DC SPECIAL INSTRUCTIONSFT
The procedure was performed through R arm.   For the next 3 days:  - Avoid pushing and pulling with the affected wrist.   - Avoid repeated movement of the affected hand and wrist (typing, hammering).  - Do not lift anything more than 5 pounds.    MEDICATION:   - Take your medications as explained (see attached medication reconciliation on discharge paperwork).    ADDITIONAL INSTRUCTIONS:  - Follow a heart healthy diet recommended by your doctor.   - If you smoke, we encourage smoking cessation. You may call [phone] for [place] of tobacco control if you need assistance.  - Call your doctor to make appointment within 2 weeks.    ***CALL YOUR DOCTOR***  - If you experience fever, chills, body aches, or severe pain, swelling, redness, heat, or yellow discharge from your incision site.  - If you notice bleeding or significant swelling at the procedural site.  - If you experience chest pain.  - If you experience extremity numbness, tingling, or temperature change.    If you are unable to get in contact with your doctor, you may contact the Interventional Recovery Suite at [phone].  Please reference your discharge instructions for any questions or concerns.

## 2025-02-18 NOTE — H&P CARDIOLOGY - MENTAL STATUS
Unable to assess mental status, as the patient is aphasic and lethargic, opens his eyes to loud voice and grimacing when is being moved.   He moves all four extremities, unable to assess the strength and sensation. Unable to assess mental status, as the patient is aphasic, was sleeping upon arrival to [facility] but now is alert, grimacing when is being moved and sometimes responds with his eyes, noted when his family arrived to [facility].   He moves all four extremities, unable to assess the strength and sensation.

## 2025-02-18 NOTE — H&P ADULT - ASSESSMENT
5 yo M with hx trach/vent, CVA (nonverbal at baseline), COPD, ESRD on HD MTTS, pressure ulcer admitted to RCU for management of trach/vent iso leukocytosis 2/2 likely UTI.     NEUROLOGY  #Nonverbal  #Hx CVA  - A&Ox** at baseline, able to make some needs known  - continue to monitor    PULMONARY  #Trach/Vent  #COPD  - respiratory support as needed  - no symptoms  - RVP negative  - f/u CXR  - duonebs PRN    CARDIOVASCULAR  #HTN  #HLD  #CAD  - hold amlodipine 10 mg q24, hydralazine 25 mg TID. nifedipine 90 mg ER   - atorvastatin 20 mg  - asa 81 mg    RENAL  #ESRD  #AV fistulogram  #UTI  - on 4 day/week HD (last ***)  - will need HD as inpatient  - midodrine 10 mg on dialysis days  - consult Nephrology  - monitor electrolytes, replete PRN ( K, phos, Mg)  - UTI treatment per ID section, no hydro or obstructive symptoms  - may need to consult Vascular for fistulogram as patient was in cath lab today for this    GASTROINTESTINAL  #GERD  #PEG tube  - on omeprazole 40 mg q24  - start protonix 40 mg daily    INFECTIOUS DISEASE  #UTI  - CTX 1g q24  - f/u BCx  - f/u UCx    ENDOCRINE  #DM  - glargine 25u qHS with premeal SSI     HEME  #anemia  #hx DVT  - anemia likely iso ESRD and chronic renal disease  - at baseline 7.2  - trend CBC  - ferrous sulfate 220 mg q24    FLUIDS/ELECTROLYTES/NUTRITION  # Diet: PEG  # IVF: none  # Monitor, Replete to K>4 and Mg>2  # Transfuse for Hgb <7, Plt<10    PROPHYLAXIS  # DVT: SCD  # GI: per GI section    DISPO: RCU  CODE STATUS: Presumed full code, will clarify with pt/family 5 yo M with hx trach/vent, CVA (nonverbal at baseline), COPD, ESRD on HD MTTS, pressure ulcer admitted to RCU for management of trach/vent iso leukocytosis 2/2 likely UTI.     NEUROLOGY  #Nonverbal  #Hx CVA  - A&Ox** at baseline, able to make some needs known  - continue to monitor    PULMONARY  #Trach/Vent  #COPD  - respiratory support as needed  - no symptoms  - RVP negative  - f/u CXR  - duonebs PRN    CARDIOVASCULAR  #HTN  #HLD  #CAD  - hold amlodipine 10 mg q24, hydralazine 25 mg TID. nifedipine 90 mg ER   - atorvastatin 20 mg    RENAL  #ESRD  #AV fistulogram (RUE)  #UTI  - on 4 day/week HD (last ***)  - will need HD as inpatient  - midodrine 10 mg on dialysis days  - consult Nephrology  - monitor electrolytes, replete PRN ( K, phos, Mg)  - UTI treatment per ID section, no hydro or obstructive symptoms  - may need to consult Vascular for fistulogram as patient was in cath lab today for this    GASTROINTESTINAL  #GERD  #PEG tube  - on omeprazole 40 mg q24  - start protonix 40 mg daily    INFECTIOUS DISEASE  #UTI  - CTX 1g q24  - f/u BCx  - f/u UCx    ENDOCRINE  #DM  - glargine 25u qHS with premeal SSI     HEME  #anemia  #hx DVT  - anemia likely iso ESRD and chronic renal disease  - at baseline 7.2  - trend CBC  - ferrous sulfate 220 mg q24    FLUIDS/ELECTROLYTES/NUTRITION  # Diet: PEG  # IVF: none  # Monitor, Replete to K>4 and Mg>2  # Transfuse for Hgb <7, Plt<10    PROPHYLAXIS  # DVT: SCD  # GI: per GI section    DISPO: RCU  CODE STATUS: Presumed full code, will clarify with pt/family 5 yo M with hx trach/vent, CVA (nonverbal at baseline), COPD, ESRD on HD MTTS, pressure ulcer admitted to RCU for management of trach/vent iso leukocytosis 2/2 likely UTI.     NEUROLOGY  #Nonverbal  #Hx CVA  - A&Ox** at baseline, able to make some needs known  - continue to monitor    PULMONARY  #Trach/Vent  #COPD  - respiratory support as needed  - no symptoms  - RVP negative  - f/u CXR  - duonebs PRN    CARDIOVASCULAR  #HTN  #HLD  - no evidence of septic shock  - hold amlodipine 10 mg q24, hydralazine 25 mg TID. nifedipine 90 mg ER   - atorvastatin 20 mg    RENAL  #ESRD  #AV fistulogram (RUE)  #UTI  - on 4 day/week HD (last ***)  - will need HD as inpatient  - midodrine 10 mg on dialysis days  - consult Nephrology  - monitor electrolytes, replete PRN ( K, phos, Mg)  - UTI treatment per ID section, no hydro or obstructive symptoms  - may need to consult Vascular for fistulogram as patient was in cath lab today for this    GASTROINTESTINAL  #GERD  #PEG tube  - on omeprazole 40 mg q24  - start protonix 40 mg daily    INFECTIOUS DISEASE  #UTI  - CTX 1g q24  - f/u BCx  - f/u UCx    ENDOCRINE  #DM  - glargine 25u qHS with premeal SSI     HEME  #anemia  #hx DVT  - anemia likely iso ESRD and chronic renal disease  - at baseline 7.2  - trend CBC  - ferrous sulfate 220 mg q24    FLUIDS/ELECTROLYTES/NUTRITION  # Diet: PEG  # IVF: none  # Monitor, Replete to K>4 and Mg>2  # Transfuse for Hgb <7, Plt<10    PROPHYLAXIS  # DVT: SCD  # GI: per GI section    DISPO: RCU  CODE STATUS: Presumed full code, will clarify with pt/family 75 yo M with hx trach/vent, CVA (nonverbal at baseline), COPD, ESRD on HD MTTS, pressure ulcer admitted to RCU for management of trach/vent iso leukocytosis 2/2 likely UTI.     NEUROLOGY  #Nonverbal  #Hx CVA  - A&Ox** at baseline, able to make some needs known  - continue to monitor    PULMONARY  #Trach/Vent  #COPD  - respiratory support as needed  - no symptoms  - RVP negative  - f/u CXR  - duonebs PRN    CARDIOVASCULAR  #HTN  #HLD  - no evidence of septic shock  - hold amlodipine 10 mg q24, hydralazine 25 mg TID. nifedipine 90 mg ER   - atorvastatin 20 mg    RENAL  #ESRD  #AV fistulogram (RUE)  #UTI  - on 4 day/week HD (last ***)  - will need HD as inpatient  - midodrine 10 mg on dialysis days  - consult Nephrology  - monitor electrolytes, replete PRN ( K, phos, Mg)  - UTI treatment per ID section, no hydro or obstructive symptoms  - may need to consult Vascular for fistulogram as patient was in cath lab today for this    GASTROINTESTINAL  #GERD  #PEG tube  - on omeprazole 40 mg q24  - start protonix 40 mg daily    INFECTIOUS DISEASE  #UTI  - CTX 1g q24  - f/u BCx  - f/u UCx    ENDOCRINE  #DM  - glargine 25u qHS with premeal SSI     HEME  #anemia  #hx DVT  - anemia likely iso ESRD and chronic renal disease  - at baseline 7.2  - trend CBC  - ferrous sulfate 220 mg q24    FLUIDS/ELECTROLYTES/NUTRITION  # Diet: PEG  # IVF: none  # Monitor, Replete to K>4 and Mg>2  # Transfuse for Hgb <7, Plt<10    PROPHYLAXIS  # DVT: SCD  # GI: per GI section    DISPO: RCU  CODE STATUS: Presumed full code, will clarify with pt/family 75 yo M with hx trach/vent, CVA (nonverbal at baseline), COPD, ESRD on HD MTTS, pressure ulcer admitted to RCU for management of trach/vent iso leukocytosis 2/2 likely UTI.     NEUROLOGY  #Nonverbal  #Hx CVA  - A&Ox0 at baseline, nonverbal but able to make some needs known  - continue to monitor    PULMONARY  #Trach/Vent  #COPD  - respiratory support as needed  - no symptoms  - RVP negative  - f/u CXR  - duonebs q6    CARDIOVASCULAR  #HTN  #HLD  - no evidence of septic shock  - hold amlodipine 10 mg q24, hydralazine 25 mg TID. hydralizine 50 mg daily, nifedipine 90 mg ER   - atorvastatin 20 mg daily    RENAL  #ESRD  #AV fistulogram (RUE)  #UTI  - on 3 day/week HD (last 2/17)  - will need HD as inpatient  - midodrine 10 mg on dialysis days  - consult Nephrology  - monitor electrolytes, replete PRN ( K, phos, Mg)  - UTI treatment per ID section, no hydro or obstructive symptoms  - may need to consult Vascular for fistulogram as patient was in cath lab today for this    GASTROINTESTINAL  #GERD  #PEG tube  - on omeprazole 40 mg q24  - start protonix 40 mg daily    INFECTIOUS DISEASE  #UTI  - CTX 1g q24  - f/u BCx  - f/u UCx    ENDOCRINE  #DM  - glargine 25u qHS with premeal SSI     HEME  #anemia  #hx DVT  - anemia likely iso ESRD and chronic renal disease  - at baseline 7.2  - trend CBC  - ferrous sulfate 220 mg q24    FLUIDS/ELECTROLYTES/NUTRITION  # Diet: PEG  # IVF: none  # Monitor, Replete to K>4 and Mg>2  # Transfuse for Hgb <7, Plt<10    PROPHYLAXIS  # DVT: SCD  # GI: per GI section    DISPO: RCU  CODE STATUS: Presumed full code, will clarify with pt/family 75 yo M with hx trach/vent, CVA (nonverbal at baseline), COPD, ESRD on HD MTTS, pressure ulcer admitted to RCU for management of trach/vent iso leukocytosis 2/2 likely UTI.     NEUROLOGY  #Nonverbal  #Hx CVA  - A&Ox0 at baseline, nonverbal but able to make some needs known  - continue to monitor    PULMONARY  #Trach/Vent  #COPD  - vent settings :400/15/5/40  - respiratory support as needed  - no symptoms  - RVP negative  - f/u CXR  - duonebs q6    CARDIOVASCULAR  #HTN  #HLD  - no evidence of septic shock  - hold amlodipine 10 mg q24, hydralazine 25 mg TID. hydralizine 50 mg daily, nifedipine 90 mg ER   - atorvastatin 20 mg daily    RENAL  #ESRD  #AV fistulogram (RUE)  #UTI  - on 3 day/week HD (last 2/17)  - will need HD as inpatient  - midodrine 10 mg on dialysis days  - consult Nephrology  - monitor electrolytes, replete PRN ( K, phos, Mg)  - UTI treatment per ID section, no hydro or obstructive symptoms  - may need to consult Vascular for fistulogram as patient was in cath lab today for this    GASTROINTESTINAL  #GERD  #PEG tube  - on omeprazole 40 mg q24    INFECTIOUS DISEASE  #UTI  - CTX 1g q24  - f/u BCx  - f/u UCx    ENDOCRINE  #DM  - glargine 25u qHS with SSI q6    HEME  #anemia  #hx DVT  - anemia likely iso ESRD and chronic renal disease  - at baseline 7.2  - trend CBC  - ferrous sulfate 220 mg q24    FLUIDS/ELECTROLYTES/NUTRITION  # Diet: PEG with tube feeds  # IVF: none  # Monitor, Replete to K>4 and Mg>2  # Transfuse for Hgb <7, Plt<10    PROPHYLAXIS  # DVT: SCD  # GI: per GI section    DISPO: RCU  CODE STATUS: Presumed full code, will clarify with pt/family

## 2025-02-18 NOTE — H&P CARDIOLOGY - NSICDXPASTMEDICALHX_GEN_ALL_CORE_FT
PAST MEDICAL HISTORY:  Aphasia     Constipation     CVA (cerebrovascular accident)     ESRD on hemodialysis     GERD (gastroesophageal reflux disease)     History of cerebrovascular accident (CVA) with residual deficit     History of DVT of lower extremity     HLD (hyperlipidemia)     HTN (hypertension)     Insulin dependent type 2 diabetes mellitus     PEG (percutaneous endoscopic gastrostomy) status     Presence of suprapubic catheter     Pressure ulcer     Tracheostomy in place      PAST MEDICAL HISTORY:  Aphasia     Constipation     CVA (cerebrovascular accident)     ESRD on hemodialysis     GERD (gastroesophageal reflux disease)     History of cerebrovascular accident (CVA) with residual deficit     History of DVT of lower extremity     HLD (hyperlipidemia)     HTN (hypertension)     Insulin dependent type 2 diabetes mellitus     PEG (percutaneous endoscopic gastrostomy) status     Pressure ulcer     Tracheostomy in place

## 2025-02-18 NOTE — ED ADULT NURSE NOTE - NS ED NURSE TRANSPORT WITH
Self-inflicted laceration w knife to left forearm, hx self harm. Bleeding controlled. Pt admits to ETOH & MDMA use tonight.    Cardiac Monitor/Defib/ACLS/Rescue Kit/O2/BVM/ventilator

## 2025-02-18 NOTE — ED PROVIDER NOTE - OBJECTIVE STATEMENT
74-year-old male hx trach/vent - , CVA, COPD, stage renal disease on dialysis 4 times a week (MTRF) history of pressure ulcer.  Patient presents the ED today for elevated white count.  Patient was initially upstairs in Cath Lab with vascular surgery for fixing of the fistulogram and noted to have WBC of 18 and sent to ER for further evaluation. Pt. is nonverbal at baseline - son at bedside aiding in history - states patient at normal baseline- is able to somewhat make needs known and has not been c/o pain anywhere. Pt unable to further aid in history.

## 2025-02-18 NOTE — H&P ADULT - HISTORY OF PRESENT ILLNESS
75 yo M with hx trach/vent (last changed ***, size ***), CVA, COPD, ESRD on HD MTTS, pressure ulcer presenting to [facility] ED for leukocytosis. Patient was intially up in the cath lab earlier today with vascular surgery for fistulogram and noted to have a WBC of 18 and sent to ED. Patient nonverbal at [facility], history provided by son. Per son, patient at normal baseline, somewhat able to make needs known and is not complaining of any pain.      ED course:Initial vitals T 37.1, HR 74, /68, SpO2 100% on ***. Labs notable for WBC 16, RBC 7.3 (baseline 7.2), , Na 132, K 3.2, bicarb 30, Cr 3.08 (baseline ~3), Alk phos 260, normal LFTs, VBG with pH 7.34, pCO2 65, UA with large leuk esterase, moderate blood, > 6k WBCs, and many bacteria, RVP negative.     Patient to be admitted to RCU as he is trach/vented in setting of leukocytosis 2/2 likely UTI with plan for further evaluation/management.   73 yo M with hx trach/vent (last changed ***, size ***), CVA, COPD, ESRD on HD MTTS, pressure ulcer presenting to [facility] ED for leukocytosis. Patient was intially up in the cath lab earlier today with vascular surgery for fistulogram and noted to have a WBC of 18 and sent to ED. Patient nonverbal at [facility], history provided by son. Per son, patient at normal baseline, somewhat able to make needs known and is not complaining of any pain.      ED course:Initial vitals T 37.1, HR 74, /68, SpO2 100% on ***. Labs notable for WBC 18, RBC 7.3 (baseline 7.2), , Na 132, K 3.2, bicarb 30, Cr 3.08 (baseline ~3), Alk phos 260, normal LFTs, VBG with pH 7.34, pCO2 65, UA with large leuk esterase, moderate blood, > 6k WBCs, and many bacteria, RVP negative.     Patient to be admitted to RCU as he is trach/vented in setting of leukocytosis 2/2 likely UTI with plan for further evaluation/management.   75 yo M with hx trach/vent (last changed 10/23/24), CVA x2 with residual R hemiplegia, COPD, ESRD on HD MWF (last 2/17), pressure ulcer presenting to [facility] ED for leukocytosis. Patient was intially up in the cath lab earlier today with vascular surgery for fistulogram and noted to have a WBC of 18 and sent to ED. Patient nonverbal at baseline, history provided by son. Per son, patient at normal baseline, somewhat able to make needs known and is not complaining of any pain.      ED course:Initial vitals T 37.1, HR 74, /68, SpO2 100%. Labs notable for WBC 18, RBC 7.3 (baseline 7.2), , Na 132, K 3.2, bicarb 30, Cr 3.08 (baseline ~3), Alk phos 260, normal LFTs, VBG with pH 7.34, pCO2 65, UA with large leuk esterase, moderate blood, > 6k WBCs, and many bacteria, RVP negative.     Patient to be admitted to RCU as he is trach/vented in setting of leukocytosis 2/2 likely UTI with plan for further evaluation/management.

## 2025-02-18 NOTE — CHART NOTE - NSCHARTNOTEFT_GEN_A_CORE
The patient is s/o RUE AV fistulogram, was noted to have WBC 18, Hgb 7,2, Na 128.  Levy Taylor was made aware, recommended to send the patient to ER for evaluation and possibly admit to the Hospital.   The patient is stable at present.

## 2025-02-18 NOTE — CONSULT NOTE ADULT - ASSESSMENT
This is a 73y/o M with PMHx of HTN, IDDM2, CVA x2 ( with R. sided weakness and dysphagia s/p PEG tube), bedbound with multiple pressure ulcers, hx of RLE DVT (s/p apixaban course), recent admission for respiratory failure s/p tracheostomy (10/23/24), ESRD on HD (MWF) now s/p RUE fistulogram with angioplasty. Pre-procedure lab work was significant for leukocytosis to 18.6, H/H 7.2/23.0, and hyperglycemia in 300s.     For further work-up, patient was sent to ED for further evaluation.    Recs:  - no acute surgical intervention  - medicine admission for infectious workup  - ok to use AVF for HD    Vascular Surgery  [id number]

## 2025-02-18 NOTE — H&P CARDIOLOGY - CONSTITUTIONAL COMMENTS
The patient is aphasic and lethargic, opens his eyes and grimacing when is being moved. The patient is aphasic, was sleeping upon arrival to [facility] but now is alert, grimacing when is being moved and sometimes responds with his eyes, noted when his family arrived to [facility].

## 2025-02-18 NOTE — ED PROVIDER NOTE - ATTENDING APP SHARED VISIT CONTRIBUTION OF CARE
Attending note:   After face to face evaluation of this patient, I concur with above noted hx, pe, and care plan for this patient. Malu: Is a 74-year-old male with with a trach and vent, CVA, COPD, stage renal disease on dialysis 4 times a week, history of pressure ulcer.  Patient presents the ED today for elevated white count.  Patient was initially upstairs in Cath Lab with vascular surgery for fixing of the fistulogram.  At that time white count was noted to be elevated.  As per family at bedside no other medical complaints.  Patient is afebrile rectally and vitals are stable with no tachycardia noted.  Abdomen is soft and lungs appear clear anteriorly.  There is no pitting edema.  There is some contractures noted.  Will get labs now including flu COVID swab, urinalysis.  As per wife patient makes urine occasionally.  Get chest x-ray.  Abdomen does not appear to be source of infection at this time we will continue to monitor.  Patient will likely require IV antibiotics and admission.  Already discussed with patient's nephrologist Dr. Mena and will have dialysis arranged.

## 2025-02-18 NOTE — CONSULT NOTE ADULT - ASSESSMENT
74 y o male S/p multiple CVA, bed bound, trach and vent, CKD for fistulogram.  May have minimal sedation by cath lab RN under the surgeon's guidance with careful monitoring of spo2, RR, ETCO2, BP and EKG.

## 2025-02-18 NOTE — H&P ADULT - ATTENDING COMMENTS
74M history hypertension, type 2 diabetes, ESRD on HD via RUE AV fistula, prior CVA with right-sided residual weakness and dysphagia, status post trach/PEG from respiratory failure presenting with UTI sepsis.  Found to have leukocytosis and altered mental status during fistulogram, ED workup revealing UTI, started on antibiotics.  MICU consulted for RCU admission overnight given vent dependence.    On exam, mildly hypertensive, afebrile, no tachycardia or hypoxia.  Appears lethargic however awakens to tactile stimuli and voice.  #6 Shiley present C/D/I, lungs grossly clear to auscultation bilateral, abdomen soft, there is some evidence of blood at the urethral meatus likely from recent straight cath, cachectic    Labs significant for leukocytosis, chronic appearing anemia, left shift, mild hyponatremia and hypokalemia with known elevated creatinine, compensated respiratory acidosis and grossly positive UA for infection.    74M admitted to RCU for UTI sepsis.  Currently normotensive not requiring vasopressor support.  Would empirically give liberal balanced crystalloid fluid resuscitation.  Obtain chest x-ray to assess for airspace disease.  Broaden antibiotic coverage to cefepime given nursing home residents and HD.  Follow urine culture and blood culture, narrow as appropriate.  HD scheduled as per nephrology, no emergent indication at this time.  DVT prophylaxis.  Full code.  Dispo RCU.    AUGUST Jimenez MD  Critical Care Medicine

## 2025-02-18 NOTE — ED PROVIDER NOTE - NS ED MD DISPO SPECIAL CONSIDERATION1
Returned by [facility] in w/c-     Kacey Rehman, YESI  11/18/17 3464 Trach Dressing (No Sutures): dry sterile dressing

## 2025-02-18 NOTE — H&P CARDIOLOGY - ADDITIONAL PE
R hip - pressure ulcer stage IV  L hip - pressure ulcer stage IV  Sacral area - pressure ulcer stage III  L heel - pressure ulcer stage II

## 2025-02-18 NOTE — ED ADULT NURSE NOTE - OBJECTIVE STATEMENT
pt received in room 20 from cath lab s/p fistulogram, sent to ED for elevated WBC count. pt is chronic trach to vent, coming from nursing home. nonverbal at baseline. PEG tube noted. pt with BM upon arrival, hygiene care provided, turned and positioned, 3 un-stageable pressure wounds noted (sacrum, R buttock, L buttock). pt appears comfortable, withdraws from painful stimuli. USIV established by PA, labs drawn and sent.   pt straight catheterized using sterile technique, RN chaperoned by PCA Kb. 300ML of brown, opaque urine was output. pt tolerated procedure well.

## 2025-02-18 NOTE — ED PROVIDER NOTE - PROGRESS NOTE DETAILS
ROBBIN Wagner - straight cath for urine yeilded ~300 cc light brown opaque colored urine. Concern for UTI - source of elevated WBC - will send for UA/UCx

## 2025-02-18 NOTE — H&P CARDIOLOGY - EXTREMITIES COMMENTS
R upper extremity + edema above elbow. + AV fistula, unable to feel thrill but good palpable pulse over the area.

## 2025-02-18 NOTE — H&P CARDIOLOGY - REVIEW OF SYSTEMS
as per patient's wife, the patient did not have hematochezia melena, hematuria. He did have bleeding from his gums, for that reason, ASA was d/c by his PMD.

## 2025-02-18 NOTE — ASU DISCHARGE PLAN (ADULT/PEDIATRIC) - CARE PROVIDER_API CALL
Jessica Lockett  Vascular Surgery  [address], [address]  [place] [address]  Phone: [phone]  Fax: [phone]  Follow Up Time:

## 2025-02-18 NOTE — H&P ADULT - NSHPPHYSICALEXAM_GEN_ALL_CORE
Vital Signs Last 24 Hrs  T(C): 37.1 (18 Feb 2025 14:16), Max: 37.1 (18 Feb 2025 14:16)  T(F): 98.7 (18 Feb 2025 14:16), Max: 98.7 (18 Feb 2025 14:16)  HR: 74 (18 Feb 2025 16:17) (62 - 74)  BP: 171/68 (18 Feb 2025 16:17) (127/70 - 176/89)  BP(mean): --  RR: 15 (18 Feb 2025 16:17) (15 - 18)  SpO2: 100% (18 Feb 2025 16:17) (100% - 100%)    Parameters below as of 18 Feb 2025 16:17  Patient On (Oxygen Delivery Method): ventilator      CONSTITUTIONAL: NAD; well-developed  HEENT: PERRL, clear conjunctiva  RESPIRATORY: Normal respiratory effort; lungs are clear to auscultation bilaterally; No crackles/rhonchi/wheezing; trach in place  CARDIOVASCULAR: Regular rate and rhythm, normal S1 and S2, no murmur/rub/gallop; No lower extremity edema; Peripheral pulses are 2+ bilaterally  ABDOMEN: Nontender to palpation, normoactive bowel sounds, no rebound/guarding; No hepatosplenomegaly; PEG tube in place  MUSCULOSKELETAL: No clubbing or cyanosis of digits; no joint swelling or tenderness to palpation  EXTREMITY: Lower extremities non-tender to palpation; non-erythematous B/L  NEURO: Nonverbal at baseline   PSYCH: Normal mood; affect appropriate

## 2025-02-18 NOTE — ED ADULT NURSE NOTE - NSFALLHARMRISKINTERV_ED_ALL_ED
Assistance OOB with selected safe patient handling equipment if applicable/Assistance with ambulation/Communicate risk of Fall with Harm to all staff, patient, and family/Monitor gait and stability/Provide visual cue: red socks, yellow wristband, yellow gown, etc/Reinforce activity limits and safety measures with patient and family/Toileting schedule using arm’s reach rule for commode and bathroom/Bed in lowest position, wheels locked, appropriate side rails in place/Call bell, personal items and telephone in reach/Instruct patient to call for assistance before getting out of bed/chair/stretcher/Non-slip footwear applied when patient is off stretcher/[place] to call system/Physically safe environment - no spills, clutter or unnecessary equipment/Purposeful Proactive Rounding/Room/bathroom lighting operational, light cord in reach

## 2025-02-18 NOTE — H&P CARDIOLOGY - VENOUS THROMBOEMBOLISM
I am seeing Angela Ibarra in consultation from Oralia Herrera  for evaluation of recurrent UTI.    HPI:  Angela Ibarra is a 61 year old female who is a recent pt of Dr. Mack 10/2018.  She has a urologic history of [place] TOT done 4/2010 by Dr. Jonas, status-post excision after erosion 10/2011, and status-post Durasphere injection x 1 3/2012.    History of atrophic vaginitis, had taken topical HRT in the past.      In the last year or so, had UTI 4/2019, and then 3 in 10-12 2019, and another Jan 2020.  At this time she feels her symptoms are cleared up.    Was given a prescription for a self-start antibiotics.  She knows the symptoms and signs.  Did have a urgent care visit this this.    She is off estrace cream- discussed with her we can get her a compounded form that should be less expensive.    [place] is a trigger.  Preventative antibiotics have helped this.    PVR 10cc 10/2018    Her UTI symptoms are pain, odor, urge, frequency.  Symptoms clear with antibiotics.  Gets a little itch with bactrim and Macrobid.      RU today: 180cc ( not post void)  Kidney Stones: 20 years ago, none recent.  Pneumaturia:  None  Vaginal Estrogen: Not taking.  Cranberry supplement = has tried.   Wipes from front to back: Yes  Fluid intake: very good.  Not constipated.     Last UTI 1/12/20 was pan-sensitive E. Coli.  Previous UTI 2/2019 was E. coli but was resistant to a few things.    REVIEW OF SYSTEMS:  General: negative  Skin: negative  Eyes: negative  Ears/Nose/Throat: negative  Respiratory: negative  Cardiovascular: negative  Gastrointestinal: negative  Genitourinary: see HPI  Musculoskeletal: negative  Neurologic: negative  Psychiatric: negative  Hematologic/Lymphatic/Immunologic: negative  Endocrine: negative    PAST MEDICAL HX:  Past Medical History:   Diagnosis Date     Calculus of kidney      Migraine, unspecified, without mention of intractable migraine without mention of status migrainosus      Other  specified iron deficiency anemias      Papanicolaou smear of cervix with low grade squamous intraepithelial lesion (LGSIL)     Colpo and hyst pathology benign     Primary osteoarthritis of right hip      Synovitis of ankle      Unspecified essential hypertension     Essential hypertension       PAST SURG HX:  Past Surgical History:   Procedure Laterality Date     ARTHROSCOPY ANKLE  2014    Procedure: ARTHROSCOPY ANKLE;  Surgeon: Shaun Bhatt DPM;  Location: [facility]     ARTHROSCOPY KNEE Left 10/4/2018    Procedure: ARTHROSCOPY KNEE;  Left knee arthroscopy  medial meniscal and chondral debridement;  Surgeon: Aryan Holley MD;  Location: [facility] OR     C  DELIVERY ONLY      , Low Cervical     C GASTRIC BYPASS,OBESITY,W/SM BOWEL RECONS  10/99     C LIGATE FALLOPIAN TUBE  2000    laparoscopy     COLONOSCOPY WITH CO2 INSUFFLATION N/A 2017    Procedure: COLONOSCOPY WITH CO2 INSUFFLATION;  COLON SCREEN/ YURI;  Surgeon: Cody Arana MD;  Location: [facility]     HYSTERECTOMY, PAP NO LONGER INDICATED  2008     LAPAROSCOPIC CHOLECYSTECTOMY  8/3/2012    Procedure: LAPAROSCOPIC CHOLECYSTECTOMY;  Laparoscopic Cholecystectomy ;  Surgeon: Corwin Cabezas MD;  Location: [facility]     OPEN REDUCTION INTERNAL FIXATION ANKLE  2014    Procedure: OPEN REDUCTION INTERNAL FIXATION ANKLE;  Surgeon: Shaun Bhatt DPM;  Location: [facility]     OPEN REDUCTION INTERNAL FIXATION ELBOW  10/15/2013    Procedure: OPEN REDUCTION INTERNAL FIXATION ELBOW;  OPEN REDUCTION INTERNAL FIXATION LEFT PROXIMAL ULNA;  Surgeon: Artem Last DO;  Location: [facility]     ORTHOPEDIC SURGERY      left shoulder surgery     REMOVE MESH VAGINA  10/10/2011    Procedure:REMOVE MESH VAGINA; Excision of Vaginal Mesh; Surgeon:SERGE SALGADO; Location:[facility] OR     SURGICAL HISTORY OF -   4/20/10    Transobturator midurethral sling     SURGICAL HISTORY OF -   1999    exploratory laparotomy,  colitis     SURGICAL HISTORY OF -   4/20/10    Transobturator midurethral sling     TUBAL LIGATION          FAMILY HX:  Family History   Problem Relation Age of Onset     C.A.D. Mother         MI     Hypertension Mother      Hypertension Father      Breast Cancer No family hx of        SOCIAL HX:  Social History     Tobacco Use     Smoking status: Never Smoker     Smokeless tobacco: Never Used   Substance Use Topics     Alcohol use: Yes     Comment: occas     Drug use: No       MEDICATIONS:  Current Outpatient Medications   Medication Sig     Acetaminophen (TYLENOL EXTRA STRENGTH PO) Take 1-2 tablets by mouth as needed     amLODIPine (NORVASC) 5 MG tablet Take 1 tablet (5 mg) by mouth daily     atenolol (TENORMIN) 50 MG tablet Take 1 tablet (50 mg) by mouth daily     cefdinir (OMNICEF) 300 MG capsule Take 1 capsule (300 mg) by mouth 2 times daily     diclofenac (VOLTAREN) 50 MG EC tablet Take 1 tablet (50 mg) by mouth 2 times daily As needed for pain. Take with food     estradiol (ESTRACE) 0.1 MG/GM cream Place 2 g vaginally twice a week     IBUPROFEN PO Take 400 mg by mouth every 6 hours as needed for moderate pain     traZODone (DESYREL) 50 MG tablet Take 2 tablets (100 mg) by mouth nightly as needed for sleep     Current Facility-Administered Medications   Medication     ropivacaine (NAROPIN) injection 3 mL     triamcinolone (KENALOG-40) injection 40 mg       ALLERGIES:  Bactrim [sulfamethoxazole w/trimethoprim] and Nitrofurantoin      GENERAL PHYSICAL EXAM:     BP (!) 143/81 (BP Location: Left arm, Patient Position: Sitting, Cuff Size: Adult Large)   Pulse 72   LMP 01/08/2008   SpO2 98%    Constitutional: No acute distress. Well nourished.   PSYCH: normal mood and affect.  NEURO: normal gait, no focal deficits.   EYES: anicteric, EOMI, PERR.  CARDIOPULMONARY: breathing non-labored, no wheeze or rales.  CARDIAC: pulse regular rate/rhythm, no peripheral edema.  GI: Abdomen soft, overweight, nondistended    MUSCULOSKELETAL: normal limb proportions, no muscle wasting, no contractures.     EXAM:  Deferred     Imaging/labs:  Lab Results   Component Value Date    CR 0.85 01/17/2019    CR 0.90 01/18/2018    CR 0.93 07/07/2017     ASSESSMENT:   Recurrent UTI, most likely re-infections rather than a persistent infection not clearing.    Treatment options discussed include observation with treatment of acute episodes, self start antibiotics, antibiotic dose with intercourse if that is the trigger, or a daily low dose antibiotic prophylaxis.        PLAN:    Vag estrogen cream - will prescribe.    Bactrim single strength to take at the time of intercourse, since that's her trigger.  She gets itch if she takes this often, but has used this before for preventative dosing with intercourse.        PVR today pre void is 180cc.  Previous PVR is 10cc.      Follow-up with Dr. Lilly if symptoms are not improving.      Copied cc to Consulting provider Oralia Herrera        Thank-you for the kind consultation.  Gustabo Soares MD     Urological Surgeon   no

## 2025-02-18 NOTE — H&P CARDIOLOGY - HISTORY OF PRESENT ILLNESS
74 y.o. male, resident of [facility], was brought today for elective AV fistulogram to assess stenosis.   The patient is aphasic.   History taken from patient's wife over the phone.  The patient is on dialysis M, T, Th, F 3hrs each session.   Last dialysis on 2/17/25.    The patient is full code, as per patient's wife.    74 y.o. male, resident of [facility], was brought today for elective AV fistulogram to assess stenosis.   The patient is aphasic.   History taken from patient's wife over the phone. As per patient's wife, the patient did not have hematochezia melena, hematuria. He did have bleeding from his gums, for that reason, ASA was d/c by his PMD.    The patient is on dialysis M, T, Th, F 3hrs each session.   Last dialysis on 2/17/25.    The patient is full code, as per patient's wife.    74 y.o. male, resident of [facility], was brought today for elective AV fistulogram to assess stenosis.   The patient is aphasic.  History taken from patient's wife over the phone. As per patient's wife, the patient did not have hematochezia melena, hematuria. He did have bleeding from his gums, for that reason, ASA was d/c by his PMD.    The patient is on dialysis M, T, Th, F 3hrs each session.   Last dialysis on 2/17/25.    The patient is full code, as per patient's wife.

## 2025-02-18 NOTE — CONSULT NOTE ADULT - SUBJECTIVE AND OBJECTIVE BOX
[facility]   MD ELIANE BHAGAT MD ANGELA WONG, PA        TEL:  OFFICE: [phone]  From 5pm-7am answering service [phone]    --- INITIAL RENAL CONSULT NOTE ---date of service 25 @ 16:46    HPI:  74-year-old male hx trach/vent - , CVA, COPD, stage renal disease on dialysis 4 times a week (MTRF) history of pressure ulcer.  Patient presents the ED today for elevated white count.  Patient was initially upstairs in Cath Lab with vascular surgery for fixing of the fistulogram and noted to have WBC of 18 and sent to ER for further evaluation. Pt. is nonverbal at baseline. history limited       Allergies:  No Known Allergies      PAST MEDICAL & SURGICAL HISTORY:  ESRD on hemodialysis      HTN (hypertension)      Insulin dependent type 2 diabetes mellitus      History of cerebrovascular accident (CVA) with residual deficit      History of DVT of lower extremity      HLD (hyperlipidemia)      CVA (cerebrovascular accident)      Aphasia      Tracheostomy in place      PEG (percutaneous endoscopic gastrostomy) status      GERD (gastroesophageal reflux disease)      Constipation      Pressure ulcer      Arteriovenous fistula      S/P percutaneous endoscopic gastrostomy (PEG) tube placement      History of tracheostomy          Home Medications Reviewed    Hospital Medications:   MEDICATIONS  (STANDING):  chlorhexidine 0.12% Liquid 15 milliLiter(s) Oral Mucosa every 12 hours      SOCIAL HISTORY:  Denies ETOh, Smoking,     FAMILY HISTORY:  FHx: diabetes mellitus (Father, Aunt)        REVIEW OF SYSTEMS:  unable to obtain    VITALS:  T(F): 98.7 (25 @ 14:16), Max: 98.7 (25 @ 14:16)  HR: 74 (25 @ 16:17)  BP: 171/68 (25 @ 16:17)  RR: 15 (25 @ 16:17)  SpO2: 100% (25 @ 16:17)  Wt(kg): --    Height (cm): 162.6 ( 14:16), 162.6 ( 08:20)  Weight (kg): 54 ( @ 08:20)  BMI (kg/m2): 20.4 ( 14:16), 20.4 ( 08:20)  BSA (m2): 1.57 ( @ 14:16), 1.57 ( @ 08:20)    PHYSICAL EXAM:  General: nonverbal  HEENT: anicteric sclera, oropharynx clear, MMM  Neck: +trach  Respiratory: CTAB, no wheezes, rales or rhonchi  Cardiovascular: S1, S2, RRR  Gastrointestinal: +peg  Extremities: No cyanosis or clubbing. No peripheral edema  Neurological: A/O x 0  Psychiatric: Normal mood, normal affect  : No CVA tenderness. No belle.   Skin: No rashes  Vascular Access: avf + thrill. right arm swollen    LABS:      128[L]  |  86[L]  |  84[H]  ----------------------------<  312[H]  3.8   |  26  |  2.79[H]    Ca    9.6      2025 08:34      Creatinine Trend: 2.79 <--                        7.3    18.58 )-----------( 218      ( 2025 16:00 )             24.6     Urine Studies:  Urinalysis Basic - ( 2025 15:40 )    Color: Dark Yellow / Appearance: Turbid / S.016 / pH:   Gluc:  / Ketone: Negative mg/dL  / Bili: Negative / Urobili: 1.0 mg/dL   Blood:  / Protein: 300 mg/dL / Nitrite: Negative   Leuk Esterase: Large / RBC: 3 /HPF / WBC 6109 /HPF   Sq Epi:  / Non Sq Epi: 8 /HPF / Bacteria: Many /HPF          RADIOLOGY & ADDITIONAL STUDIES:

## 2025-02-18 NOTE — CONSULT NOTE ADULT - ASSESSMENT
74-year-old male hx trach/vent - , CVA, COPD, stage renal disease on dialysis 4 times a week (MTRF) history of pressure ulcer.  Patient presents the ED today for elevated white count. nephrology consulted for dialysis needs    ESRD:  nephrologist Dr Felix   from [facility]  unable to get in touch with family called wife and daughter  On HD TIW via an A-V fistula.  - HD on MTTS schedule, via A-V fistula.   f/u vascular for fistulogram. currently deferred due to leukocytosis  lab reviewed no urgent need for hd today.   will reassess in the am.  will attempt to call family for consent. if unable to reach will need two attending consent  monitor electrolyte     Anemia:  - Transfuse for Hg < 7.  epo with hd  check iron panel  monitor    leukocytosis  sepsis work up per team    hyponatremia  in setting of esrd and hyperglycemia  optimize dm control  hd likely tmr  monitor   74-year-old male hx trach/vent - , CVA, COPD, stage renal disease on dialysis 4 times a week (MTRF) history of pressure ulcer.  Patient presents the ED today for elevated white count. nephrology consulted for dialysis needs    ESRD:  from [facility]  unable to get in touch with family called wife and daughter  On HD TIW via an A-V fistula.  - HD on MTTS schedule, via A-V fistula.   f/u vascular for fistulogram. currently deferred due to leukocytosis  lab reviewed no urgent need for hd today.   will reassess in the am.  will attempt to call family for consent. if unable to reach will need two attending consent  monitor electrolyte     Anemia:  - Transfuse for Hg < 7.  epo with hd  check iron panel  monitor    leukocytosis  sepsis work up per team    hyponatremia  in setting of esrd and hyperglycemia  optimize dm control  hd likely tmr  monitor

## 2025-02-18 NOTE — H&P ADULT - NSHPLABSRESULTS_GEN_ALL_CORE
7.3    18.58 )-----------( 218      ( 18 Feb 2025 16:00 )             24.6       02-18    132[L]  |  88[L]  |  90[H]  ----------------------------<  59[L]  3.4[L]   |  30  |  3.08[H]    Ca    10.4      18 Feb 2025 16:00    TPro  7.7  /  Alb  3.4  /  TBili  0.5  /  DBili  x   /  AST  20  /  ALT  13  /  AlkPhos  260[H]  02-18              Urinalysis Basic - ( 18 Feb 2025 16:00 )    Color: x / Appearance: x / SG: x / pH: x  Gluc: 59 mg/dL / Ketone: x  / Bili: x / Urobili: x   Blood: x / Protein: x / Nitrite: x   Leuk Esterase: x / RBC: x / WBC x   Sq Epi: x / Non Sq Epi: x / Bacteria: x                  CAPILLARY BLOOD GLUCOSE      POCT Blood Glucose.: 94 mg/dL (18 Feb 2025 13:59)

## 2025-02-18 NOTE — ASU PATIENT PROFILE, ADULT - FALL HARM RISK - HARM RISK INTERVENTIONS

## 2025-02-18 NOTE — ED PROVIDER NOTE - NS ED MD DISPO ADMITTING SERVICE
ICU Clofazimine Counseling:  I discussed with the patient the risks of clofazimine including but not limited to skin and eye pigmentation, liver damage, nausea/vomiting, gastrointestinal bleeding and allergy.

## 2025-02-18 NOTE — ED ADULT TRIAGE NOTE - CHIEF COMPLAINT QUOTE
Pt coming from cath lab s/p RUE fistulogram with angioplasty for elevated WBC. Pt chronic trach to vent. Taken directly to room 20. PMHx DM, HTN, CVA x2 ( with R. sided weakness and dysphagia s/p PEG tube), bedbound with multiple pressure ulcers,  ESRD M/W/F

## 2025-02-18 NOTE — ED ADULT NURSE NOTE - NSICDXPASTMEDICALHX_GEN_ALL_CORE_FT
PAST MEDICAL HISTORY:  Aphasia     Constipation     CVA (cerebrovascular accident)     ESRD on hemodialysis     GERD (gastroesophageal reflux disease)     History of cerebrovascular accident (CVA) with residual deficit     History of DVT of lower extremity     HLD (hyperlipidemia)     HTN (hypertension)     Insulin dependent type 2 diabetes mellitus     PEG (percutaneous endoscopic gastrostomy) status     Pressure ulcer     Tracheostomy in place

## 2025-02-18 NOTE — H&P CARDIOLOGY - COMMENTS
Informed consent was obtained by Dr. Lockett from patien's wife over the phone.   Pre-sedation evaluation    The patient has tracheostomy  Last PO intake: 2/17/25 PM    Level of consciousness: The patient is lethargic  Obstructive sleep apnea: No, as per patient's wife  Aspiration risk: Yes. The patient cannot have anything orally,  has PEG tube  Mallampati score: unable to assess  ASA Classification: 4  Prior Sedative or Anesthesia Experience: No complications, as per patient's wife  Informed consent by responsible adult: Yes  Responsible adult escort: Yes  Based on today's assessment, anesthesia consult requested: Yes As per Dr. Lockett, the patient was seen by Dr. Lockett in her office and he appeared lethargic and non responsive during the office visit. Informed consent was obtained by Dr. Lockett/vascular surgery fellow from patient's wife over the phone.   Pre-sedation evaluation  The patient has tracheostomy  Last PO intake: 2/17/25 PM, as per rehab RN  Level of consciousness: The patient is lethargic, not responsive, but does open his eyes to loud voice and grimacing when is being moved.   Obstructive sleep apnea: No, as per patient's wife  Aspiration risk: Yes. The patient cannot have anything orally,  has PEG tube  Mallampati score: unable to assess  ASA Classification: 4  Prior Sedative or Anesthesia Experience: No complications, as per patient's wife  Informed consent by responsible adult: Yes  Responsible adult escort: Yes  Based on today's assessment, anesthesia consult requested: Yes

## 2025-02-19 ENCOUNTER — NON-APPOINTMENT (OUTPATIENT)
Age: 75
End: 2025-02-19

## 2025-02-19 LAB
A1C WITH ESTIMATED AVERAGE GLUCOSE RESULT: 6.4 % — HIGH (ref 4–5.6)
ALBUMIN SERPL ELPH-MCNC: 3.1 G/DL — LOW (ref 3.3–5)
ALP SERPL-CCNC: 179 U/L — HIGH (ref 40–120)
ALT FLD-CCNC: 12 U/L — SIGNIFICANT CHANGE UP (ref 4–41)
ALT FLD-CCNC: 9 U/L — SIGNIFICANT CHANGE UP (ref 4–41)
ANION GAP SERPL CALC-SCNC: 20 MMOL/L — HIGH (ref 7–14)
APTT BLD: 28.9 SEC — SIGNIFICANT CHANGE UP (ref 24.5–35.6)
AST SERPL-CCNC: 18 U/L — SIGNIFICANT CHANGE UP (ref 4–40)
BILIRUB SERPL-MCNC: 0.5 MG/DL — SIGNIFICANT CHANGE UP (ref 0.2–1.2)
BLD GP AB SCN SERPL QL: NEGATIVE — SIGNIFICANT CHANGE UP
BUN SERPL-MCNC: 96 MG/DL — HIGH (ref 7–23)
CALCIUM SERPL-MCNC: 9.7 MG/DL — SIGNIFICANT CHANGE UP (ref 8.4–10.5)
CHLORIDE SERPL-SCNC: 87 MMOL/L — LOW (ref 98–107)
CO2 SERPL-SCNC: 25 MMOL/L — SIGNIFICANT CHANGE UP (ref 22–31)
CREAT SERPL-MCNC: 3.62 MG/DL — HIGH (ref 0.5–1.3)
DIALYSIS INSTRUMENT RESULT - HEPATITIS B SURFACE ANTIGEN: NEGATIVE — SIGNIFICANT CHANGE UP
EGFR: 17 ML/MIN/1.73M2 — LOW
EGFR: 17 ML/MIN/1.73M2 — LOW
ESTIMATED AVERAGE GLUCOSE: 137 — SIGNIFICANT CHANGE UP
FERRITIN SERPL-MCNC: 3601 NG/ML — HIGH (ref 30–400)
FOLATE SERPL-MCNC: >20 NG/ML — HIGH (ref 3.1–17.5)
GLUCOSE BLDC GLUCOMTR-MCNC: 140 MG/DL — HIGH (ref 70–99)
GLUCOSE BLDC GLUCOMTR-MCNC: 143 MG/DL — HIGH (ref 70–99)
GLUCOSE BLDC GLUCOMTR-MCNC: 153 MG/DL — HIGH (ref 70–99)
GLUCOSE BLDC GLUCOMTR-MCNC: 159 MG/DL — HIGH (ref 70–99)
GLUCOSE SERPL-MCNC: 133 MG/DL — HIGH (ref 70–99)
GRAM STN FLD: ABNORMAL
HCT VFR BLD CALC: 19.3 % — CRITICAL LOW (ref 39–50)
HCT VFR BLD CALC: 21.4 % — LOW (ref 39–50)
HCT VFR BLD CALC: 28.8 % — LOW (ref 39–50)
HGB BLD-MCNC: 6.1 G/DL — CRITICAL LOW (ref 13–17)
HGB BLD-MCNC: 6.7 G/DL — CRITICAL LOW (ref 13–17)
HGB BLD-MCNC: 8.9 G/DL — LOW (ref 13–17)
INR BLD: 1.07 RATIO — SIGNIFICANT CHANGE UP (ref 0.85–1.16)
IRON SATN MFR SERPL: 32 % — SIGNIFICANT CHANGE UP (ref 14–50)
IRON SATN MFR SERPL: 46 UG/DL — SIGNIFICANT CHANGE UP (ref 45–165)
MAGNESIUM SERPL-MCNC: 3.2 MG/DL — HIGH (ref 1.6–2.6)
MCHC RBC-ENTMCNC: 22.9 PG — LOW (ref 27–34)
MCHC RBC-ENTMCNC: 23.6 PG — LOW (ref 27–34)
MCHC RBC-ENTMCNC: 23.8 PG — LOW (ref 27–34)
MCHC RBC-ENTMCNC: 30.9 G/DL — LOW (ref 32–36)
MCHC RBC-ENTMCNC: 31.3 G/DL — LOW (ref 32–36)
MCHC RBC-ENTMCNC: 31.6 G/DL — LOW (ref 32–36)
MCV RBC AUTO: 74 FL — LOW (ref 80–100)
MCV RBC AUTO: 74.5 FL — LOW (ref 80–100)
NRBC # BLD AUTO: 0.06 K/UL — HIGH (ref 0–0)
NRBC # BLD AUTO: 0.08 K/UL — HIGH (ref 0–0)
NRBC # BLD AUTO: 0.08 K/UL — HIGH (ref 0–0)
NRBC # FLD: 0.06 K/UL — HIGH (ref 0–0)
NRBC # FLD: 0.08 K/UL — HIGH (ref 0–0)
NRBC # FLD: 0.08 K/UL — HIGH (ref 0–0)
NRBC BLD AUTO-RTO: 0 /100 WBCS — SIGNIFICANT CHANGE UP (ref 0–0)
PHOSPHATE SERPL-MCNC: 3.1 MG/DL — SIGNIFICANT CHANGE UP (ref 2.5–4.5)
PLATELET # BLD AUTO: 197 K/UL — SIGNIFICANT CHANGE UP (ref 150–400)
PLATELET # BLD AUTO: 214 K/UL — SIGNIFICANT CHANGE UP (ref 150–400)
PLATELET # BLD AUTO: 221 K/UL — SIGNIFICANT CHANGE UP (ref 150–400)
POTASSIUM SERPL-MCNC: 3.8 MMOL/L — SIGNIFICANT CHANGE UP (ref 3.5–5.3)
POTASSIUM SERPL-SCNC: 3.8 MMOL/L — SIGNIFICANT CHANGE UP (ref 3.5–5.3)
PROTHROM AB SERPL-ACNC: 12.7 SEC — SIGNIFICANT CHANGE UP (ref 9.9–13.4)
RBC # BLD: 2.59 M/UL — LOW (ref 4.2–5.8)
RBC # BLD: 2.82 M/UL — LOW (ref 4.2–5.8)
RBC # BLD: 3.89 M/UL — LOW (ref 4.2–5.8)
RBC # FLD: 19.4 % — HIGH (ref 10.3–14.5)
RBC # FLD: 20.4 % — HIGH (ref 10.3–14.5)
RBC # FLD: 20.6 % — HIGH (ref 10.3–14.5)
RH IG SCN BLD-IMP: POSITIVE — SIGNIFICANT CHANGE UP
SODIUM SERPL-SCNC: 132 MMOL/L — LOW (ref 135–145)
SPECIMEN SOURCE: SIGNIFICANT CHANGE UP
TIBC SERPL-MCNC: 142 UG/DL — LOW (ref 220–430)
UIBC SERPL-MCNC: 96 UG/DL — LOW (ref 110–370)
VIT B12 SERPL-MCNC: 1647 PG/ML — HIGH (ref 200–900)
WBC # BLD: 13.53 K/UL — HIGH (ref 3.8–10.5)
WBC # BLD: 14.57 K/UL — HIGH (ref 3.8–10.5)
WBC # FLD AUTO: 13.53 K/UL — HIGH (ref 3.8–10.5)
WBC # FLD AUTO: 14.38 K/UL — HIGH (ref 3.8–10.5)
WBC # FLD AUTO: 14.57 K/UL — HIGH (ref 3.8–10.5)

## 2025-02-19 PROCEDURE — 99233 SBSQ HOSP IP/OBS HIGH 50: CPT | Mod: FS

## 2025-02-19 RX ORDER — MINOCYCLINE HCL 50 MG
200 TABLET ORAL EVERY 12 HOURS
Refills: 0 | Status: DISCONTINUED | OUTPATIENT
Start: 2025-02-19 | End: 2025-02-22

## 2025-02-19 RX ORDER — FERROUS SULFATE 137(45) MG
300 TABLET, EXTENDED RELEASE ORAL DAILY
Refills: 0 | Status: DISCONTINUED | OUTPATIENT
Start: 2025-02-19 | End: 2025-03-19

## 2025-02-19 RX ORDER — B1/B2/B3/B5/B6/B12/VIT C/FOLIC 500-0.5 MG
1 TABLET ORAL DAILY
Refills: 0 | Status: DISCONTINUED | OUTPATIENT
Start: 2025-02-19 | End: 2025-03-19

## 2025-02-19 RX ORDER — EPOETIN ALFA 10000 [IU]/ML
10000 SOLUTION INTRAVENOUS; SUBCUTANEOUS
Refills: 0 | Status: DISCONTINUED | OUTPATIENT
Start: 2025-02-19 | End: 2025-02-23

## 2025-02-19 RX ORDER — CEFEPIME 2 G/20ML
1000 INJECTION, POWDER, FOR SOLUTION INTRAVENOUS EVERY 24 HOURS
Refills: 0 | Status: DISCONTINUED | OUTPATIENT
Start: 2025-02-19 | End: 2025-02-26

## 2025-02-19 RX ADMIN — EPOETIN ALFA 10000 UNIT(S): 10000 SOLUTION INTRAVENOUS; SUBCUTANEOUS at 17:49

## 2025-02-19 RX ADMIN — Medication 15 MILLILITER(S): at 18:01

## 2025-02-19 RX ADMIN — Medication 300 MILLIGRAM(S): at 19:11

## 2025-02-19 RX ADMIN — Medication 1 APPLICATION(S): at 11:17

## 2025-02-19 RX ADMIN — Medication 40 MILLIGRAM(S): at 06:31

## 2025-02-19 RX ADMIN — CEFEPIME 33.33 MILLIGRAM(S): 2 INJECTION, POWDER, FOR SOLUTION INTRAVENOUS at 21:22

## 2025-02-19 RX ADMIN — Medication 15 MILLILITER(S): at 00:14

## 2025-02-19 RX ADMIN — Medication 15 MILLILITER(S): at 11:17

## 2025-02-19 RX ADMIN — INSULIN GLARGINE-YFGN 25 UNIT(S): 100 INJECTION, SOLUTION SUBCUTANEOUS at 00:11

## 2025-02-19 RX ADMIN — ATORVASTATIN CALCIUM 20 MILLIGRAM(S): 80 TABLET, FILM COATED ORAL at 21:19

## 2025-02-19 RX ADMIN — Medication 250 MILLIGRAM(S): at 19:01

## 2025-02-19 RX ADMIN — Medication 1 TABLET(S): at 19:11

## 2025-02-19 RX ADMIN — INSULIN LISPRO 1: 100 INJECTION, SOLUTION INTRAVENOUS; SUBCUTANEOUS at 12:04

## 2025-02-19 NOTE — DIETITIAN INITIAL EVALUATION ADULT - OTHER INFO
Per chart, 73 yo male with hx trach/vent, CVA (nonverbal at baseline), COPD, ESRD on HD MTTS, pressure ulcer admitted to RCU for management of trach/vent iso leukocytosis 2/2 likely UTI.     Unable to conduct nutrition interview 2/2 decreased cognition. Information obtained from comprehensive chart review and per RN today: No reports of any recent episodes of nausea, vomiting, diarrhea or constipation, Last BM noted 2/19 per RN flowsheets. Continue to monitor and document BMs in flowsheets. No food allergies per EMR. UBW per HIE: January 2024: 116#, February 2024: 113#, March 2024: 115#, Current wt in house 119# per EMR. Height per HIE: 5'8". Pt remains NPO TF only as sole source of nutrition and hydration; Nepro @ 50ml/hr x24hrs (IBW: 70kg) 1200ml total volume, 2160 kcal (30kcal/kg), 97 gm protein (1.4 gm/kg), 876ml free water from formula. Current TF provisions meet Pt's ~estimated needs.

## 2025-02-19 NOTE — DIETITIAN INITIAL EVALUATION ADULT - NSFNSGIIOFT_GEN_A_CORE
02-18-25 @ 07:01  -  02-19-25 @ 07:00  --------------------------------------------------------  OUT:  Total OUT: 0 mL    Total NET: 150 mL

## 2025-02-19 NOTE — PROGRESS NOTE ADULT - ASSESSMENT
74M PMHx HTN, IDDM2, CVA x2 ( with R. sided weakness and dysphagia s/p PEG tube), bedbound with multiple pressure ulcers, hx of RLE DVT (s/p apixaban course), recent admission for respiratory failure s/p tracheostomy (10/23/24), ESRD on HD ([facility]) now s/p RUE fistulogram with angioplasty (02/18/25, [facility]) found to have profound hyperglycemia, anemia, and leukocytosis admitted to RCU.     Recs:  - Ok to use AVF for HD  - Please call vascular surgery if prolonged bleeding s/p cessation of HD catheter or issues with HD    Vascular Surgery  [id number]

## 2025-02-19 NOTE — PROGRESS NOTE ADULT - SUBJECTIVE AND OBJECTIVE BOX
CHIEF COMPLAINT:Patient is a 74y old  Male who presents with a chief complaint of     INTERVAL EVENTS:     ROS: Seen by bedside during AM rounds     OBJECTIVE:  ICU Vital Signs Last 24 Hrs  T(C): 36.3 (19 Feb 2025 04:00), Max: 37.1 (18 Feb 2025 14:16)  T(F): 97.3 (19 Feb 2025 04:00), Max: 98.7 (18 Feb 2025 14:16)  HR: 80 (19 Feb 2025 04:00) (62 - 80)  BP: 145/71 (19 Feb 2025 04:00) (127/70 - 176/89)  BP(mean): --  ABP: --  ABP(mean): --  RR: 15 (19 Feb 2025 04:00) (15 - 18)  SpO2: 100% (19 Feb 2025 04:00) (100% - 100%)    O2 Parameters below as of 19 Feb 2025 04:00  Patient On (Oxygen Delivery Method): ventilator  O2 Flow (L/min): 40        Mode: AC/ CMV (Assist Control/ Continuous Mandatory Ventilation), RR (machine): 15, TV (machine): 400, FiO2: 40, PEEP: 5, ITime: 0.7, MAP: 11, PIP: 35    02-18 @ 07:01  -  02-19 @ 07:00  --------------------------------------------------------  IN: 150 mL / OUT: 0 mL / NET: 150 mL      CAPILLARY BLOOD GLUCOSE      POCT Blood Glucose.: 140 mg/dL (19 Feb 2025 05:35)      PHYSICAL EXAM:  General:   HEENT:   Lymph Nodes:  Neck:   Respiratory:   Cardiovascular:   Abdomen:   Extremities:   Skin:   Neurological:  Psychiatry:    Mode: AC/ CMV (Assist Control/ Continuous Mandatory Ventilation)  RR (machine): 15  TV (machine): 400  FiO2: 40  PEEP: 5  ITime: 0.7  MAP: 11  PIP: 35      HOSPITAL MEDICATIONS:  MEDICATIONS  (STANDING):  atorvastatin 20 milliGRAM(s) Oral at bedtime  cefTRIAXone   IVPB 1000 milliGRAM(s) IV Intermittent Once  chlorhexidine 0.12% Liquid 15 milliLiter(s) Oral Mucosa every 12 hours  dextrose 5%. 1000 milliLiter(s) (100 mL/Hr) IV Continuous <Continuous>  dextrose 5%. 1000 milliLiter(s) (50 mL/Hr) IV Continuous <Continuous>  dextrose 50% Injectable 25 Gram(s) IV Push once  dextrose 50% Injectable 12.5 Gram(s) IV Push once  dextrose 50% Injectable 25 Gram(s) IV Push once  glucagon  Injectable 1 milliGRAM(s) IntraMuscular once  insulin glargine Injectable (LANTUS) 25 Unit(s) SubCutaneous at bedtime  insulin lispro (ADMELOG) corrective regimen sliding scale   SubCutaneous every 6 hours  pantoprazole   Suspension 40 milliGRAM(s) Oral before breakfast    MEDICATIONS  (PRN):  dextrose Oral Gel 15 Gram(s) Oral once PRN Blood Glucose LESS THAN 70 milliGRAM(s)/deciliter      LABS:                        7.3    18.58 )-----------( 218      ( 18 Feb 2025 16:00 )             24.6     02-18    132[L]  |  88[L]  |  90[H]  ----------------------------<  59[L]  3.4[L]   |  30  |  3.08[H]    Ca    10.4      18 Feb 2025 16:00    TPro  7.7  /  Alb  3.4  /  TBili  0.5  /  DBili  x   /  AST  20  /  ALT  13  /  AlkPhos  260[H]  02-18    PT/INR - ( 19 Feb 2025 06:45 )   PT: 12.7 sec;   INR: 1.07 ratio         PTT - ( 19 Feb 2025 06:45 )  PTT:28.9 sec  Urinalysis Basic - ( 18 Feb 2025 16:00 )    Color: x / Appearance: x / SG: x / pH: x  Gluc: 59 mg/dL / Ketone: x  / Bili: x / Urobili: x   Blood: x / Protein: x / Nitrite: x   Leuk Esterase: x / RBC: x / WBC x   Sq Epi: x / Non Sq Epi: x / Bacteria: x        Venous Blood Gas:  02-18 @ 16:00  7.34/65/32/35/48.7  VBG Lactate: 1.6   CHIEF COMPLAINT:Patient is a 74y old  Male who presents with a chief complaint of     INTERVAL EVENTS:     ROS: Seen by bedside during AM rounds     OBJECTIVE:  ICU Vital Signs Last 24 Hrs  T(C): 36.3 (19 Feb 2025 04:00), Max: 37.1 (18 Feb 2025 14:16)  T(F): 97.3 (19 Feb 2025 04:00), Max: 98.7 (18 Feb 2025 14:16)  HR: 80 (19 Feb 2025 04:00) (62 - 80)  BP: 145/71 (19 Feb 2025 04:00) (127/70 - 176/89)  BP(mean): --  ABP: --  ABP(mean): --  RR: 15 (19 Feb 2025 04:00) (15 - 18)  SpO2: 100% (19 Feb 2025 04:00) (100% - 100%)    O2 Parameters below as of 19 Feb 2025 04:00  Patient On (Oxygen Delivery Method): ventilator  O2 Flow (L/min): 40        Mode: AC/ CMV (Assist Control/ Continuous Mandatory Ventilation), RR (machine): 15, TV (machine): 400, FiO2: 40, PEEP: 5, ITime: 0.7, MAP: 11, PIP: 35    02-18 @ 07:01  -  02-19 @ 07:00  --------------------------------------------------------  IN: 150 mL / OUT: 0 mL / NET: 150 mL      CAPILLARY BLOOD GLUCOSE      POCT Blood Glucose.: 140 mg/dL (19 Feb 2025 05:35)      PHYSICAL EXAM:  General: NAD   Neck: (+) Trach tube noted, site c/d/i.  Cards: S1/S2, no murmurs   Pulm: CTA bilaterally. No wheezes.   Abdomen: Soft, NTND. (+) PEG noted, site c/d/i.   Extremities: RUE AVF thrill appreciated. No pedal edema. Extremities warm to touch.  Neurology: Awake/eyes open. Tracks intermittently. Nonverbal. Not following command.s   Skin: warm to touch, color appropriate for ethnicity. Refer to RN assessment for further details.    Mode: AC/ CMV (Assist Control/ Continuous Mandatory Ventilation)  RR (machine): 15  TV (machine): 400  FiO2: 40  PEEP: 5  ITime: 0.7  MAP: 11  PIP: 35      HOSPITAL MEDICATIONS:  MEDICATIONS  (STANDING):  atorvastatin 20 milliGRAM(s) Oral at bedtime  cefTRIAXone   IVPB 1000 milliGRAM(s) IV Intermittent Once  chlorhexidine 0.12% Liquid 15 milliLiter(s) Oral Mucosa every 12 hours  dextrose 5%. 1000 milliLiter(s) (100 mL/Hr) IV Continuous <Continuous>  dextrose 5%. 1000 milliLiter(s) (50 mL/Hr) IV Continuous <Continuous>  dextrose 50% Injectable 25 Gram(s) IV Push once  dextrose 50% Injectable 12.5 Gram(s) IV Push once  dextrose 50% Injectable 25 Gram(s) IV Push once  glucagon  Injectable 1 milliGRAM(s) IntraMuscular once  insulin glargine Injectable (LANTUS) 25 Unit(s) SubCutaneous at bedtime  insulin lispro (ADMELOG) corrective regimen sliding scale   SubCutaneous every 6 hours  pantoprazole   Suspension 40 milliGRAM(s) Oral before breakfast    MEDICATIONS  (PRN):  dextrose Oral Gel 15 Gram(s) Oral once PRN Blood Glucose LESS THAN 70 milliGRAM(s)/deciliter      LABS:                        7.3    18.58 )-----------( 218      ( 18 Feb 2025 16:00 )             24.6     02-18    132[L]  |  88[L]  |  90[H]  ----------------------------<  59[L]  3.4[L]   |  30  |  3.08[H]    Ca    10.4      18 Feb 2025 16:00    TPro  7.7  /  Alb  3.4  /  TBili  0.5  /  DBili  x   /  AST  20  /  ALT  13  /  AlkPhos  260[H]  02-18    PT/INR - ( 19 Feb 2025 06:45 )   PT: 12.7 sec;   INR: 1.07 ratio         PTT - ( 19 Feb 2025 06:45 )  PTT:28.9 sec  Urinalysis Basic - ( 18 Feb 2025 16:00 )    Color: x / Appearance: x / SG: x / pH: x  Gluc: 59 mg/dL / Ketone: x  / Bili: x / Urobili: x   Blood: x / Protein: x / Nitrite: x   Leuk Esterase: x / RBC: x / WBC x   Sq Epi: x / Non Sq Epi: x / Bacteria: x        Venous Blood Gas:  02-18 @ 16:00  7.34/65/32/35/48.7  VBG Lactate: 1.6   CHIEF COMPLAINT:Patient is a 74y old  Male who presents with a chief complaint of     INTERVAL EVENTS: admitted last evening for workup of leukocytosis post fistulogram and angioplasty yesterday.     ROS: Unable to obtain ROS given patient is nonverbal baseline.    OBJECTIVE:  ICU Vital Signs Last 24 Hrs  T(C): 36.3 (19 Feb 2025 04:00), Max: 37.1 (18 Feb 2025 14:16)  T(F): 97.3 (19 Feb 2025 04:00), Max: 98.7 (18 Feb 2025 14:16)  HR: 80 (19 Feb 2025 04:00) (62 - 80)  BP: 145/71 (19 Feb 2025 04:00) (127/70 - 176/89)  BP(mean): --  ABP: --  ABP(mean): --  RR: 15 (19 Feb 2025 04:00) (15 - 18)  SpO2: 100% (19 Feb 2025 04:00) (100% - 100%)    O2 Parameters below as of 19 Feb 2025 04:00  Patient On (Oxygen Delivery Method): ventilator  O2 Flow (L/min): 40        Mode: AC/ CMV (Assist Control/ Continuous Mandatory Ventilation), RR (machine): 15, TV (machine): 400, FiO2: 40, PEEP: 5, ITime: 0.7, MAP: 11, PIP: 35    02-18 @ 07:01  -  02-19 @ 07:00  --------------------------------------------------------  IN: 150 mL / OUT: 0 mL / NET: 150 mL      CAPILLARY BLOOD GLUCOSE      POCT Blood Glucose.: 140 mg/dL (19 Feb 2025 05:35)      PHYSICAL EXAM:  General: NAD   Neck: (+) Trach tube noted, site c/d/i.  Cards: S1/S2, no murmurs   Pulm: CTA bilaterally. No wheezes.   Abdomen: Soft, NTND. (+) PEG noted, site c/d/i.   Extremities: RUE AVF with no thrill however palpable pulse over area. No pedal edema. Extremities warm to touch.  Neurology: Awake/eyes open. Tracks intermittently. Nonverbal. Not following command.s   Skin: warm to touch, color appropriate for ethnicity. Refer to RN assessment for further details.    Mode: AC/ CMV (Assist Control/ Continuous Mandatory Ventilation)  RR (machine): 15  TV (machine): 400  FiO2: 40  PEEP: 5  ITime: 0.7  MAP: 11  PIP: 35      HOSPITAL MEDICATIONS:  MEDICATIONS  (STANDING):  atorvastatin 20 milliGRAM(s) Oral at bedtime  cefTRIAXone   IVPB 1000 milliGRAM(s) IV Intermittent Once  chlorhexidine 0.12% Liquid 15 milliLiter(s) Oral Mucosa every 12 hours  dextrose 5%. 1000 milliLiter(s) (100 mL/Hr) IV Continuous <Continuous>  dextrose 5%. 1000 milliLiter(s) (50 mL/Hr) IV Continuous <Continuous>  dextrose 50% Injectable 25 Gram(s) IV Push once  dextrose 50% Injectable 12.5 Gram(s) IV Push once  dextrose 50% Injectable 25 Gram(s) IV Push once  glucagon  Injectable 1 milliGRAM(s) IntraMuscular once  insulin glargine Injectable (LANTUS) 25 Unit(s) SubCutaneous at bedtime  insulin lispro (ADMELOG) corrective regimen sliding scale   SubCutaneous every 6 hours  pantoprazole   Suspension 40 milliGRAM(s) Oral before breakfast    MEDICATIONS  (PRN):  dextrose Oral Gel 15 Gram(s) Oral once PRN Blood Glucose LESS THAN 70 milliGRAM(s)/deciliter      LABS:                        7.3    18.58 )-----------( 218      ( 18 Feb 2025 16:00 )             24.6     02-18    132[L]  |  88[L]  |  90[H]  ----------------------------<  59[L]  3.4[L]   |  30  |  3.08[H]    Ca    10.4      18 Feb 2025 16:00    TPro  7.7  /  Alb  3.4  /  TBili  0.5  /  DBili  x   /  AST  20  /  ALT  13  /  AlkPhos  260[H]  02-18    PT/INR - ( 19 Feb 2025 06:45 )   PT: 12.7 sec;   INR: 1.07 ratio         PTT - ( 19 Feb 2025 06:45 )  PTT:28.9 sec  Urinalysis Basic - ( 18 Feb 2025 16:00 )    Color: x / Appearance: x / SG: x / pH: x  Gluc: 59 mg/dL / Ketone: x  / Bili: x / Urobili: x   Blood: x / Protein: x / Nitrite: x   Leuk Esterase: x / RBC: x / WBC x   Sq Epi: x / Non Sq Epi: x / Bacteria: x        Venous Blood Gas:  02-18 @ 16:00  7.34/65/32/35/48.7  VBG Lactate: 1.6

## 2025-02-19 NOTE — DIETITIAN INITIAL EVALUATION ADULT - NS FNS DIET ORDER
Diet, NPO with Tube Feed:   Tube Feeding Modality: Gastrostomy  Nepro with Carb Steady (NEPRORTH)  Total Volume for 24 Hours (mL): 1200  Continuous  Starting Tube Feed Rate {mL per Hour}: 25  Increase Tube Feed Rate by (mL): 5     Every 12 hours  Until Goal Tube Feed Rate (mL per Hour): 50  Tube Feed Duration (in Hours): 24  Tube Feed Start Time: 17:00  Tube Feed Stop Time: 11:00 (02-18-25 @ 19:08)

## 2025-02-19 NOTE — DIETITIAN INITIAL EVALUATION ADULT - ADD RECOMMEND
recommend nephrovite, and ferrous sulfate once daily for micronutrient and mineral provisions   LPS 1x/day (15gm protein)

## 2025-02-19 NOTE — DIETITIAN INITIAL EVALUATION ADULT - ORAL INTAKE PTA/DIET HISTORY
Nursing home paperwork ([facility]): Nepro @ 75ml/hr x 13 hrs? (1000ml total volume per day), LPS 2x/day. Pt was receiving ferrous sulfate, Vitamin D and nephrovite PTA.

## 2025-02-19 NOTE — PROGRESS NOTE ADULT - SUBJECTIVE AND OBJECTIVE BOX
[facility] [place] [facility]   MD ELIANE BHAGAT MD ANGELA WONG, PA    TEL:  OFFICE: [phone]  From 5pm-7am Answering Service [phone]    -- RENAL FOLLOW UP NOTE ---Date of Service 02-19-25 @ 12:00    Patient is a 74y old  Male who presents with a chief complaint of     Patient seen and examined at bedside.  VITALS:  T(F): 97.3 (02-19-25 @ 04:00), Max: 98.7 (02-18-25 @ 14:16)  HR: 85 (02-19-25 @ 11:20)  BP: 145/71 (02-19-25 @ 04:00)  RR: 15 (02-19-25 @ 04:00)  SpO2: 100% (02-19-25 @ 11:20)  Wt(kg): --    02-18 @ 07:01  -  02-19 @ 07:00  --------------------------------------------------------  IN: 150 mL / OUT: 0 mL / NET: 150 mL      Height (cm): 162.6 (02-18 @ 14:16)    PHYSICAL EXAM:  General: nonverbal  Neck: +trach  Respiratory: CTAB, no wheezes, rales or rhonchi  Cardiovascular: S1, S2, RRR  Gastrointestinal: +peg  Extremities: No peripheral edema    Hospital Medications:   MEDICATIONS  (STANDING):  atorvastatin 20 milliGRAM(s) Oral at bedtime  cefTRIAXone   IVPB 1000 milliGRAM(s) IV Intermittent Once  chlorhexidine 0.12% Liquid 15 milliLiter(s) Oral Mucosa every 12 hours  chlorhexidine 2% Cloths 1 Application(s) Topical daily  dextrose 5%. 1000 milliLiter(s) (100 mL/Hr) IV Continuous <Continuous>  dextrose 5%. 1000 milliLiter(s) (50 mL/Hr) IV Continuous <Continuous>  dextrose 50% Injectable 25 Gram(s) IV Push once  dextrose 50% Injectable 12.5 Gram(s) IV Push once  dextrose 50% Injectable 25 Gram(s) IV Push once  epoetin reilly-epbx (RETACRIT) Injectable 21322 Unit(s) IV Push <User Schedule>  glucagon  Injectable 1 milliGRAM(s) IntraMuscular once  insulin glargine Injectable (LANTUS) 25 Unit(s) SubCutaneous at bedtime  insulin lispro (ADMELOG) corrective regimen sliding scale   SubCutaneous every 6 hours  pantoprazole   Suspension 40 milliGRAM(s) Oral before breakfast      LABS:  02-19    132[L]  |  87[L]  |  96[H]  ----------------------------<  133[H]  3.8   |  25  |  3.62[H]    Ca    9.7      19 Feb 2025 06:45  Phos  3.1     02-19  Mg     3.20     02-19    TPro  7.1  /  Alb  3.1[L]  /  TBili  0.5  /  DBili      /  AST  18  /  ALT  9   /  AlkPhos  179[H]  02-19    Creatinine Trend: 3.62 <--, 3.08 <--, 2.79 <--    Albumin: 3.1 g/dL (02-19 @ 06:45)  Phosphorus: 3.1 mg/dL (02-19 @ 06:45)  Albumin: 3.4 g/dL (02-18 @ 16:00)                              6.1    14.38 )-----------( 197      ( 19 Feb 2025 09:23 )             19.3     Urine Studies:  Urinalysis - [02-19-25 @ 06:45]      Color  / Appearance  / SG  / pH       Gluc 133 / Ketone   / Bili  / Urobili        Blood  / Protein  / Leuk Est  / Nitrite       RBC  / WBC  / Hyaline  / Gran  / Sq Epi  / Non Sq Epi  / Bacteria       Iron 16, TIBC 121, %sat 13      [05-17-24 @ 06:00]  Ferritin 720      [05-17-24 @ 06:00]  PTH -- (Ca --)      [05-26-24 @ 01:20]   122  TSH 1.660      [10-05-24 @ 08:37]  Lipid: chol --, , HDL --, LDL --      [10-18-24 @ 06:00]        RADIOLOGY & ADDITIONAL STUDIES:

## 2025-02-19 NOTE — DIETITIAN INITIAL EVALUATION ADULT - NSFNSPHYEXAMSKINFT_GEN_A_CORE
Pressure Injury 1: Left:, buttocks, Stage II  Pressure Injury 2: Right:, buttocks, Stage II  Pressure Injury 3: sacrum, Stage II

## 2025-02-19 NOTE — DIETITIAN INITIAL EVALUATION ADULT - PERTINENT MEDS FT
MEDICATIONS  (STANDING):  atorvastatin 20 milliGRAM(s) Oral at bedtime  cefTRIAXone   IVPB 1000 milliGRAM(s) IV Intermittent Once  chlorhexidine 0.12% Liquid 15 milliLiter(s) Oral Mucosa every 12 hours  chlorhexidine 2% Cloths 1 Application(s) Topical daily  dextrose 5%. 1000 milliLiter(s) (100 mL/Hr) IV Continuous <Continuous>  dextrose 5%. 1000 milliLiter(s) (50 mL/Hr) IV Continuous <Continuous>  dextrose 50% Injectable 25 Gram(s) IV Push once  dextrose 50% Injectable 12.5 Gram(s) IV Push once  dextrose 50% Injectable 25 Gram(s) IV Push once  epoetin reilly-epbx (RETACRIT) Injectable 79156 Unit(s) IV Push <User Schedule>  glucagon  Injectable 1 milliGRAM(s) IntraMuscular once  insulin glargine Injectable (LANTUS) 25 Unit(s) SubCutaneous at bedtime  insulin lispro (ADMELOG) corrective regimen sliding scale   SubCutaneous every 6 hours  pantoprazole   Suspension 40 milliGRAM(s) Oral before breakfast    MEDICATIONS  (PRN):  dextrose Oral Gel 15 Gram(s) Oral once PRN Blood Glucose LESS THAN 70 milliGRAM(s)/deciliter  sodium chloride 0.9% Bolus. 100 milliLiter(s) IV Bolus every 5 minutes PRN SBP LESS THAN or EQUAL to 80 mmHg

## 2025-02-19 NOTE — DIETITIAN INITIAL EVALUATION ADULT - PERTINENT LABORATORY DATA
02-19    132[L]  |  87[L]  |  96[H]  ----------------------------<  133[H]  3.8   |  25  |  3.62[H]    Ca    9.7      19 Feb 2025 06:45  Phos  3.1     02-19  Mg     3.20     02-19    TPro  7.1  /  Alb  3.1[L]  /  TBili  0.5  /  DBili  x   /  AST  18  /  ALT  9   /  AlkPhos  179[H]  02-19  POCT Blood Glucose.: 153 mg/dL (02-19-25 @ 11:39)  A1C with Estimated Average Glucose Result: 6.4 % (02-19-25 @ 06:45)  A1C with Estimated Average Glucose Result: 4.8 % (10-05-24 @ 08:37)  A1C with Estimated Average Glucose Result: 5.9 % (07-16-24 @ 10:25)

## 2025-02-19 NOTE — PROGRESS NOTE ADULT - ASSESSMENT
ASSESSMENT/PLAN  73 yo M with hx trach/vent, CVA (nonverbal at baseline), COPD, ESRD on HD MTTS, pressure ulcer admitted to [facility] for management of trach/vent iso leukocytosis 2/2 likely UTI.     NEUROLOGY  #Nonverbal  #Hx CVA  - A&Ox0 at baseline, nonverbal but able to make some needs known  - continue to monitor    PULMONARY  #Trach/Vent  #COPD  - vent settings :400/15/5/40  - respiratory support as needed  - no symptoms  - RVP negative  - f/u CXR  - duonebs q6    CARDIOVASCULAR  #HTN  #HLD  - no evidence of septic shock  - hold amlodipine 10 mg q24, hydralazine 25 mg TID. hydralizine 50 mg daily, nifedipine 90 mg ER   - atorvastatin 20 mg daily    RENAL  #ESRD  #AV fistulogram (RUE)  #UTI  - on 3 day/week HD (last 2/17)  - will need HD as inpatient  - midodrine 10 mg on dialysis days  - consult Nephrology  - monitor electrolytes, replete PRN ( K, phos, Mg)  - UTI treatment per ID section, no hydro or obstructive symptoms  - may need to consult Vascular for fistulogram as patient was in cath lab today for this    GASTROINTESTINAL  #GERD  #PEG tube  - on omeprazole 40 mg q24    INFECTIOUS DISEASE  #UTI  - CTX 1g q24  - f/u BCx  - f/u UCx    ENDOCRINE  #DM  - glargine 25u qHS with SSI q6    HEME  #anemia  #hx DVT  - anemia likely iso ESRD and chronic renal disease  - at baseline 7.2  - trend CBC  - ferrous sulfate 220 mg q24    FLUIDS/ELECTROLYTES/NUTRITION  # Diet: PEG with tube feeds  # IVF: none  # Monitor, Replete to K>4 and Mg>2  # Transfuse for Hgb <7, Plt<10    PROPHYLAXIS  # DVT: SCD  # GI: per GI section    DISPO: R[facility]  CODE STATUS: Presumed full code, will clarify with pt/family   ASSESSMENT/PLAN  73 yo M with hx trach/vent, CVA (nonverbal at baseline), COPD, ESRD on HD MTTS, pressure ulcer admitted to [facility] for management of trach/vent iso leukocytosis 2/2 likely UTI.     NEUROLOGY  #Nonverbal  #Hx CVA  - A&Ox0 at baseline, nonverbal but able to make some needs known  - continue to monitor    PULMONARY  #Trach/Vent  #COPD  - vent settings :400/15/5/40  - respiratory support as needed  - no symptoms  - RVP negative  - f/u CXR  - duonebs q6    CARDIOVASCULAR  #HTN  #HLD  - no evidence of septic shock  - hold amlodipine 10 mg q24, hydralazine 25 mg TID. hydralizine 50 mg daily, nifedipine 90 mg ER   - atorvastatin 20 mg daily    RENAL  #ESRD ON HD (M/T/TH/S)  #AV fistulogram (RUE)  #UTI  - on 3 day/week HD (last 2/17)  - will need HD as inpatient  - midodrine 10 mg on dialysis days  - Nephrology following   - monitor electrolytes, replete PRN ( K, phos, Mg)  - UTI treatment per ID section, no hydro or obstructive symptoms  - S/P AV fistulogram with angioplasty (2/19) with Vascular Surgery    GASTROINTESTINAL  #GERD  #PEG tube  - on omeprazole 40 mg q24    INFECTIOUS DISEASE  #UTI  - CTX 1g q24  - f/u BCx  - f/u UCx    ENDOCRINE  #DM  - glargine 25u qHS with SSI q6    HEME  #anemia  #hx DVT  - anemia likely iso ESRD and chronic renal disease  - at baseline 7.2  - trend CBC  - ferrous sulfate 220 mg q24  - Drop in HGB requiring 1U PRBC (2/19)  - Pending RUE US r/o active hematoma given pitting edema near medial R-elbow    FLUIDS/ELECTROLYTES/NUTRITION  # Diet: PEG with tube feeds  # IVF: none  # Monitor, Replete to K>4 and Mg>2  # Transfuse for Hgb <7, Plt<10    PROPHYLAXIS  # DVT: SCD  # GI: per GI section    DISPO: [facility]  CODE STATUS: Presumed full code, will clarify with pt/family   ASSESSMENT/PLAN  75 yo M with hx trach/vent, CVA (nonverbal at baseline), COPD, ESRD on HD MTTS, pressure ulcer admitted to [facility] for management of trach/vent iso leukocytosis 2/2 likely UTI.     NEUROLOGY  #Nonverbal  #Hx CVA  - A&Ox0 at baseline, nonverbal but able to make some needs known  - continue to monitor    PULMONARY  #Trach/Vent  #COPD  - vent settings :400/15/5/40  - respiratory support as needed  - no symptoms  - RVP negative  - f/u CXR  - duonebs q6    CARDIOVASCULAR  #HTN  #HLD  - no evidence of septic shock  - hold amlodipine 10 mg q24, hydralazine 25 mg TID. hydralizine 50 mg daily, nifedipine 90 mg ER   - atorvastatin 20 mg daily    RENAL  #ESRD ON HD (M/T/TH/S)  #AV fistulogram (RUE)  #UTI  - on 3 day/week HD (last 2/17)  - will need HD as inpatient  - midodrine 10 mg on dialysis days  - Nephrology following   - monitor electrolytes, replete PRN ( K, phos, Mg)  - UTI treatment per ID section, no hydro or obstructive symptoms  - S/P AV fistulogram with angioplasty (2/19) with Vascular Surgery    GASTROINTESTINAL  #GERD  #PEG tube  - on omeprazole 40 mg q24    INFECTIOUS DISEASE  #UTI  - CTX 1g q24  - f/u BCx  - f/u UCx    ENDOCRINE  #DM  - glargine 25u qHS with SSI q6    HEME  #anemia  #hx DVT  - anemia likely AOCD iso ESRD and chronic renal disease  - TIBC and Ferritin high with total Iron borderline (46)  - at baseline 7.2  - trend CBC  - c/w ferrous sulfate 220 mg q24  - Drop in HGB requiring 1U PRBC (2/19)  - Pending RUE US r/o active hematoma given pitting edema near medial R-elbow    FLUIDS/ELECTROLYTES/NUTRITION  # Diet: PEG with tube feeds  # IVF: none  # Monitor, Replete to K>4 and Mg>2  # Transfuse for Hgb <7, Plt<10    PROPHYLAXIS  # DVT: SCD  # GI: per GI section    DISPO: RCU  CODE STATUS: Presumed full code, will clarify with pt/family

## 2025-02-19 NOTE — DIETITIAN INITIAL EVALUATION ADULT - ENTERAL
Change to Nepro @ 65ml/hr x18hrs to better meet schedule for HD.   Will provide roughly the same amount of volume, kcal, protein (1170ml, 2106kcal, 95 gm protein, 854ml free water)

## 2025-02-19 NOTE — PROGRESS NOTE ADULT - NS ATTEND AMEND GEN_ALL_CORE FT
I have made amendments to the documentation where necessary. Additional comments: 74-year-old man with a past medical history of CVA with residual hemiplegia COPD, ESRD on dialysis, chronic hypoxic hypercapnic respiratory failure tracheostomy and vent dependent, PEG in place, pressure ulcer was sent to the ED for leukocytosis.  Patient initially presented to the Cath Lab for fistulogram with vascular surgery.  He was found to have a white count and sent to the emergency room.  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   -.

## 2025-02-19 NOTE — PROGRESS NOTE ADULT - SUBJECTIVE AND OBJECTIVE BOX
TEAM [ C ] Surgery Daily Progress Note  =====================================================  SUBJECTIVE: Patient seen and examined at bedside on AM rounds. In RCU, on ventilator via tracheostomy. Pt shakes head no when asked if he's having pain.     PMH:   PAST MEDICAL & SURGICAL HISTORY:  ESRD on hemodialysis    HTN (hypertension)    Insulin dependent type 2 diabetes mellitus    History of cerebrovascular accident (CVA) with residual deficit    History of DVT of lower extremity    HLD (hyperlipidemia)    CVA (cerebrovascular accident)    Aphasia    Tracheostomy in place    PEG (percutaneous endoscopic gastrostomy) status    GERD (gastroesophageal reflux disease)    Constipation    Pressure ulcer    Arteriovenous fistula    S/P percutaneous endoscopic gastrostomy (PEG) tube placement    History of tracheostomy    ALLERGIES:  No Known Allergies  --------------------------------------------------------------------------------------  MEDICATIONS:    Neurologic Medications    Respiratory Medications    Cardiovascular Medications    Gastrointestinal Medications  dextrose 5%. 1000 milliLiter(s) IV Continuous <Continuous>  dextrose 5%. 1000 milliLiter(s) IV Continuous <Continuous>  pantoprazole   Suspension 40 milliGRAM(s) Oral before breakfast  sodium chloride 0.9% Bolus. 100 milliLiter(s) IV Bolus every 5 minutes PRN SBP LESS THAN or EQUAL to 80 mmHg    Genitourinary Medications    Hematologic/Oncologic Medications  epoetin reilly-epbx (RETACRIT) Injectable 12887 Unit(s) IV Push <User Schedule>    Antimicrobial/Immunologic Medications  cefTRIAXone   IVPB 1000 milliGRAM(s) IV Intermittent Once    Endocrine/Metabolic Medications  atorvastatin 20 milliGRAM(s) Oral at bedtime  dextrose 50% Injectable 25 Gram(s) IV Push once  dextrose 50% Injectable 12.5 Gram(s) IV Push once  dextrose 50% Injectable 25 Gram(s) IV Push once  dextrose Oral Gel 15 Gram(s) Oral once PRN Blood Glucose LESS THAN 70 milliGRAM(s)/deciliter  glucagon  Injectable 1 milliGRAM(s) IntraMuscular once  insulin glargine Injectable (LANTUS) 25 Unit(s) SubCutaneous at bedtime  insulin lispro (ADMELOG) corrective regimen sliding scale   SubCutaneous every 6 hours    Topical/Other Medications  chlorhexidine 0.12% Liquid 15 milliLiter(s) Oral Mucosa every 12 hours  chlorhexidine 2% Cloths 1 Application(s) Topical daily    --------------------------------------------------------------------------------------  VITAL SIGNS:    T(C): 36.3 (02-19-25 @ 04:00), Max: 37.1 (02-18-25 @ 14:16)  HR: 79 (02-19-25 @ 07:50) (62 - 80)  BP: 145/71 (02-19-25 @ 04:00) (127/70 - 176/89)  RR: 15 (02-19-25 @ 04:00) (15 - 18)  SpO2: 100% (02-19-25 @ 07:50) (100% - 100%)    --------------------------------------------------------------------------------------  PHYSICAL EXAM:  General: No acute distress, on ventilator via tracheostomy  Respiratory: Nonlabored, trach in place  RUE: palpable radial pulse, aneurysmal AVF with palpable thrill, contracted, R hand warm, unable to cooperate with motor exam, endorses sensation in hand    --------------------------------------------------------------------------------------

## 2025-02-19 NOTE — PATIENT PROFILE ADULT - FLU SEASON?
ASSESSMENT  83yo female with PMHx of afib (on xarelto), HTN and HLD, hx admission in August for anemia, CT imaging revealing cecal lesion, now  presents for scheduled laparoscopic right colectomy     s/p lap R colectomy and partial omentectomy   Intra op omentum adhered to cecum and ascending colon  anastamosis stapled  EBL 100cc, received 2100cc IV fluid.     PLAN    Neuro: AAOx 3. pain control prn. IV tylenol, morphine  CV: BP better. losartan and lopressor bid. resume xarelto for Afib when ok with surgery.  Pulm: on room air. encourage incentive spirometer  GI: NPO except meds, candy. IV PPI. bowel regimen prn  Nephro: cont IV maintenance fluids. LR @ 100 while NPO. monitor I & Os. Trend renal fxn. norma Capone: check a1c. BGMs q6hr for colon bundle. ISS.   ID: post op IV cefotetan. trend WBC. monitor temps.   Heme:  DVT ppx - hep sq.  Trend H/H. SCDs  PT eval post op  pt states her niece Emy helps her make medical decisions    Dispo: SICU. pain control. NPO w/ meds. restart xarelto/AC for Afib when ok with surgery .     Will discuss with Dr. Silvestre, d/w dr. oden Yes... Quality 110: Preventive Care And Screening: Influenza Immunization: Influenza Immunization previously received during influenza season Detail Level: Detailed Quality 130: Documentation Of Current Medications In The Medical Record: Current Medications Documented Quality 226: Preventive Care And Screening: Tobacco Use: Screening And Cessation Intervention: Patient screened for tobacco use and is an ex/non-smoker

## 2025-02-19 NOTE — PROGRESS NOTE ADULT - ASSESSMENT
74-year-old male hx trach/vent - , CVA, COPD, stage renal disease on dialysis 4 times a week (MTRF) history of pressure ulcer.  Patient presents the ED today for elevated white count. nephrology consulted for dialysis needs    ESRD:  from [facility]  On HD MTTsF via an A-V fistula. s/p fistulogram by vascular 2/18  hd today  consent from wife in dialysis unit  monitor electrolyte     Anemia:  - Transfuse for Hg < 7.  epo with hd  check iron panel  monitor    leukocytosis  sepsis work up per team    hyponatremia  in setting of esrd and hyperglycemia  optimize dm control  hd today  monitor

## 2025-02-20 ENCOUNTER — RESULT REVIEW (OUTPATIENT)
Age: 75
End: 2025-02-20

## 2025-02-20 LAB
-  AMPICILLIN: SIGNIFICANT CHANGE UP
-  CIPROFLOXACIN: SIGNIFICANT CHANGE UP
-  LEVOFLOXACIN: SIGNIFICANT CHANGE UP
-  NITROFURANTOIN: SIGNIFICANT CHANGE UP
-  TETRACYCLINE: SIGNIFICANT CHANGE UP
-  VANCOMYCIN: SIGNIFICANT CHANGE UP
ANION GAP SERPL CALC-SCNC: 16 MMOL/L — HIGH (ref 7–14)
APPEARANCE UR: ABNORMAL
BACTERIA # UR AUTO: ABNORMAL /HPF
BASOPHILS # BLD AUTO: 0.03 K/UL — SIGNIFICANT CHANGE UP (ref 0–0.2)
BASOPHILS NFR BLD AUTO: 0.3 % — SIGNIFICANT CHANGE UP (ref 0–2)
BILIRUB UR-MCNC: NEGATIVE — SIGNIFICANT CHANGE UP
BUN SERPL-MCNC: 53 MG/DL — HIGH (ref 7–23)
CALCIUM SERPL-MCNC: 9.7 MG/DL — SIGNIFICANT CHANGE UP (ref 8.4–10.5)
CAST: 4 /LPF — SIGNIFICANT CHANGE UP (ref 0–4)
CHLORIDE SERPL-SCNC: 92 MMOL/L — LOW (ref 98–107)
COLOR SPEC: YELLOW — SIGNIFICANT CHANGE UP
CREAT SERPL-MCNC: 2.16 MG/DL — HIGH (ref 0.5–1.3)
CULTURE RESULTS: ABNORMAL
DIFF PNL FLD: ABNORMAL
EGFR: 32 ML/MIN/1.73M2 — LOW
EGFR: 32 ML/MIN/1.73M2 — LOW
EOSINOPHIL # BLD AUTO: 0.19 K/UL — SIGNIFICANT CHANGE UP (ref 0–0.5)
EOSINOPHIL NFR BLD AUTO: 1.7 % — SIGNIFICANT CHANGE UP (ref 0–6)
GLUCOSE BLDC GLUCOMTR-MCNC: 186 MG/DL — HIGH (ref 70–99)
GLUCOSE BLDC GLUCOMTR-MCNC: 207 MG/DL — HIGH (ref 70–99)
GLUCOSE BLDC GLUCOMTR-MCNC: 258 MG/DL — HIGH (ref 70–99)
GLUCOSE SERPL-MCNC: 216 MG/DL — HIGH (ref 70–99)
GLUCOSE UR QL: NEGATIVE MG/DL — SIGNIFICANT CHANGE UP
HAV IGM SER-ACNC: SIGNIFICANT CHANGE UP
HBV CORE AB SER-ACNC: SIGNIFICANT CHANGE UP
HBV CORE IGM SER-ACNC: SIGNIFICANT CHANGE UP
HBV SURFACE AB SER-ACNC: 654.8 MIU/ML — SIGNIFICANT CHANGE UP
HBV SURFACE AG SER-ACNC: SIGNIFICANT CHANGE UP
HCT VFR BLD CALC: 32.1 % — LOW (ref 39–50)
HCV AB S/CO SERPL IA: 0.19 S/CO — SIGNIFICANT CHANGE UP (ref 0–0.79)
HCV AB SERPL-IMP: SIGNIFICANT CHANGE UP
HGB BLD-MCNC: 9.8 G/DL — LOW (ref 13–17)
IANC: 9.46 K/UL — HIGH (ref 1.8–7.4)
IMM GRANULOCYTES NFR BLD AUTO: 0.5 % — SIGNIFICANT CHANGE UP (ref 0–0.9)
KETONES UR-MCNC: NEGATIVE MG/DL — SIGNIFICANT CHANGE UP
LEUKOCYTE ESTERASE UR-ACNC: ABNORMAL
LYMPHOCYTES # BLD AUTO: 1.08 K/UL — SIGNIFICANT CHANGE UP (ref 1–3.3)
LYMPHOCYTES # BLD AUTO: 9.5 % — LOW (ref 13–44)
MAGNESIUM SERPL-MCNC: 2.9 MG/DL — HIGH (ref 1.6–2.6)
MCHC RBC-ENTMCNC: 23.3 PG — LOW (ref 27–34)
MCHC RBC-ENTMCNC: 30.5 G/DL — LOW (ref 32–36)
MCV RBC AUTO: 76.2 FL — LOW (ref 80–100)
METHOD TYPE: SIGNIFICANT CHANGE UP
MONOCYTES NFR BLD AUTO: 5.1 % — SIGNIFICANT CHANGE UP (ref 2–14)
MRSA PCR RESULT.: SIGNIFICANT CHANGE UP
NEUTROPHILS # BLD AUTO: 9.46 K/UL — HIGH (ref 1.8–7.4)
NEUTROPHILS NFR BLD AUTO: 82.9 % — HIGH (ref 43–77)
NITRITE UR-MCNC: NEGATIVE — SIGNIFICANT CHANGE UP
NRBC # FLD: 0.08 K/UL — HIGH (ref 0–0)
NRBC BLD AUTO-RTO: 0 /100 WBCS — SIGNIFICANT CHANGE UP (ref 0–0)
ORGANISM # SPEC MICROSCOPIC CNT: ABNORMAL
ORGANISM # SPEC MICROSCOPIC CNT: ABNORMAL
PHOSPHATE SERPL-MCNC: 2.4 MG/DL — LOW (ref 2.5–4.5)
PLATELET # BLD AUTO: 213 K/UL — SIGNIFICANT CHANGE UP (ref 150–400)
POTASSIUM SERPL-MCNC: 3.9 MMOL/L — SIGNIFICANT CHANGE UP (ref 3.5–5.3)
POTASSIUM SERPL-SCNC: 3.9 MMOL/L — SIGNIFICANT CHANGE UP (ref 3.5–5.3)
PROT UR-MCNC: 300 MG/DL
RBC # BLD: 4.21 M/UL — SIGNIFICANT CHANGE UP (ref 4.2–5.8)
RBC # FLD: 20.3 % — HIGH (ref 10.3–14.5)
RBC CASTS # UR COMP ASSIST: 7 /HPF — HIGH (ref 0–4)
S AUREUS DNA NOSE QL NAA+PROBE: SIGNIFICANT CHANGE UP
SODIUM SERPL-SCNC: 132 MMOL/L — LOW (ref 135–145)
SP GR SPEC: 1.02 — SIGNIFICANT CHANGE UP (ref 1–1.03)
SPECIMEN SOURCE: SIGNIFICANT CHANGE UP
SQUAMOUS # UR AUTO: 0 /HPF — SIGNIFICANT CHANGE UP (ref 0–5)
UROBILINOGEN FLD QL: 0.2 MG/DL — SIGNIFICANT CHANGE UP (ref 0.2–1)
WBC # FLD AUTO: 11.4 K/UL — HIGH (ref 3.8–10.5)
WBC UR QL: 129 /HPF — HIGH (ref 0–5)

## 2025-02-20 PROCEDURE — 93971 EXTREMITY STUDY: CPT | Mod: 26,RT

## 2025-02-20 PROCEDURE — 99233 SBSQ HOSP IP/OBS HIGH 50: CPT | Mod: FS

## 2025-02-20 RX ORDER — SOD PHOS DI, MONO/K PHOS MONO 250 MG
1 TABLET ORAL ONCE
Refills: 0 | Status: COMPLETED | OUTPATIENT
Start: 2025-02-20 | End: 2025-02-20

## 2025-02-20 RX ORDER — AMLODIPINE BESYLATE 10 MG/1
10 TABLET ORAL DAILY
Refills: 0 | Status: DISCONTINUED | OUTPATIENT
Start: 2025-02-20 | End: 2025-03-19

## 2025-02-20 RX ADMIN — INSULIN LISPRO 1: 100 INJECTION, SOLUTION INTRAVENOUS; SUBCUTANEOUS at 00:03

## 2025-02-20 RX ADMIN — Medication 1 TABLET(S): at 11:23

## 2025-02-20 RX ADMIN — Medication 250 MILLIGRAM(S): at 17:48

## 2025-02-20 RX ADMIN — Medication 40 MILLIGRAM(S): at 06:04

## 2025-02-20 RX ADMIN — Medication 1 APPLICATION(S): at 11:23

## 2025-02-20 RX ADMIN — INSULIN LISPRO 3: 100 INJECTION, SOLUTION INTRAVENOUS; SUBCUTANEOUS at 11:29

## 2025-02-20 RX ADMIN — INSULIN LISPRO 1: 100 INJECTION, SOLUTION INTRAVENOUS; SUBCUTANEOUS at 17:48

## 2025-02-20 RX ADMIN — CEFEPIME 33.33 MILLIGRAM(S): 2 INJECTION, POWDER, FOR SOLUTION INTRAVENOUS at 20:48

## 2025-02-20 RX ADMIN — Medication 15 MILLILITER(S): at 06:05

## 2025-02-20 RX ADMIN — ATORVASTATIN CALCIUM 20 MILLIGRAM(S): 80 TABLET, FILM COATED ORAL at 21:09

## 2025-02-20 RX ADMIN — INSULIN LISPRO 2: 100 INJECTION, SOLUTION INTRAVENOUS; SUBCUTANEOUS at 06:06

## 2025-02-20 RX ADMIN — Medication 250 MILLIGRAM(S): at 06:05

## 2025-02-20 RX ADMIN — Medication 1 PACKET(S): at 11:21

## 2025-02-20 RX ADMIN — Medication 300 MILLIGRAM(S): at 11:22

## 2025-02-20 RX ADMIN — AMLODIPINE BESYLATE 10 MILLIGRAM(S): 10 TABLET ORAL at 11:21

## 2025-02-20 RX ADMIN — Medication 15 MILLILITER(S): at 17:53

## 2025-02-20 RX ADMIN — INSULIN GLARGINE-YFGN 25 UNIT(S): 100 INJECTION, SOLUTION SUBCUTANEOUS at 00:02

## 2025-02-20 NOTE — CHART NOTE - NSCHARTNOTEFT_GEN_A_CORE
Notified this morning that patient's SBP around 4am was 188/76 and upon recheck it was 165/73. Home antihypertensives held on admission. Will restart Norvasc 10 QD today.

## 2025-02-20 NOTE — CONSULT NOTE ADULT - SUBJECTIVE AND OBJECTIVE BOX
[facility]  AUGUST Carbajal S. Shah, Y. Patel, G. Casimir  [phone]  ([phone] - weekdays after 5pm and weekends)    JIMMY BLAND  74y, Male  [id number]    HPI--  HPI:  73 yo M with hx trach/vent (last changed 10/23/24), CVA x2 with residual R hemiplegia, COPD, ESRD on HD MWF, pressure ulcer who presented to [facility] ED for leukocytosis. Patient was initially planned for fistulogram and noted to have a WBC of 18 for which he was sent to ED. Patient nonverbal at baseline. In ED labs significant for WBC 18, UA with pyuria and many bacteria, RVP negative.   He was admitted to RCU due to trach/ventilator dependence.  Patient unable to provide history therefore history obtained from chart review.     ROS: unable to assess 2/2 patient's mentation     Allergies: No Known Allergies    PMH -- Diabetes    Stage 5 chronic kidney disease not on chronic dialysis    Benign essential HTN    HLD (hyperlipidemia)    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    ESRD on hemodialysis    CVA (cerebrovascular accident)    HTN (hypertension)    Insulin dependent type 2 diabetes mellitus    History of cerebrovascular accident (CVA) with residual deficit    History of DVT of lower extremity    HLD (hyperlipidemia)    CVA (cerebrovascular accident)    Aphasia    Tracheostomy in place    PEG (percutaneous endoscopic gastrostomy) status    Presence of suprapubic catheter    GERD (gastroesophageal reflux disease)    Constipation    Pressure ulcer      PSH -- No significant past surgical history    AVF (arteriovenous fistula)    Arteriovenous fistula    S/P percutaneous endoscopic gastrostomy (PEG) tube placement    History of tracheostomy      FH -- FHx: diabetes mellitus (Father, Aunt)      Social History -- unable to assess 2/2 patient's mentation     Physical Exam--  Vital Signs Last 24 Hrs  T(F): 98 (20 Feb 2025 12:00), Max: 98 (19 Feb 2025 13:47)  HR: 75 (20 Feb 2025 12:00) (64 - 85)  BP: 166/73 (20 Feb 2025 12:00) (140/68 - 188/76)  RR: 16 (20 Feb 2025 12:00) (15 - 18)  SpO2: 99% (20 Feb 2025 12:00) (98% - 100%)  General: frail appearing  HEENT: tracheostomy, on vent  Lungs: Clear bilaterally   Heart: S1, S2, normal rate.   Abdomen: Soft. Nondistended. PEG  Neuro: non-verbal  Extremities: No LE edema.   Skin: Warm. Dry. small eschar to L heel  Psychiatric: unable to assess 2/2 patient's mentation     Laboratory & Imaging Data--  CBC:                       9.8    11.40 )-----------( 213      ( 20 Feb 2025 06:00 )             32.1     WBC Count: 11.40 K/uL (02-20-25 @ 06:00)  WBC Count: 13.53 K/uL (02-19-25 @ 18:00)  WBC Count: 14.38 K/uL (02-19-25 @ 09:23)  WBC Count: 14.57 K/uL (02-19-25 @ 06:45)  WBC Count: 18.58 K/uL (02-18-25 @ 16:00)  WBC Count: 18.60 K/uL (02-18-25 @ 08:34)    CMP: 02-20    132[L]  |  92[L]  |  53[H]  ----------------------------<  216[H]  3.9   |  24  |  2.16[H]    Ca    9.7      20 Feb 2025 06:00  Phos  2.4     02-20  Mg     2.90     02-20    TPro  x   /  Alb  x   /  TBili  x   /  DBili  x   /  AST  x   /  ALT  12  /  AlkPhos  x   02-19    LIVER FUNCTIONS - ( 19 Feb 2025 19:05 )  Alb: x     / Pro: x     / ALK PHOS: x     / ALT: 12 U/L / AST: x     / GGT: x           Urinalysis Basic - ( 20 Feb 2025 06:00 )    Color: x / Appearance: x / SG: x / pH: x  Gluc: 216 mg/dL / Ketone: x  / Bili: x / Urobili: x   Blood: x / Protein: x / Nitrite: x   Leuk Esterase: x / RBC: x / WBC x   Sq Epi: x / Non Sq Epi: x / Bacteria: x      Microbiology:     Culture - Sputum (collected 02-19-25 @ 15:54)  Source: Trach Asp Tracheal Aspirate  Gram Stain (02-19-25 @ 22:37):    Numerous polymorphonuclear leukocytes per low power field    Rare Squamous epithelial cells per low power field    Numerous Gram Negative Rods seen per oil power field    Few Gram Positive Rods seen per oil power field    Culture - Urine (collected 02-18-25 @ 18:03)  Source: Clean Catch Clean Catch (Midstream)  Preliminary Report (02-19-25 @ 21:40):    >100,000 CFU/ml Enterococcus faecium    Culture - Blood (collected 02-18-25 @ 16:05)  Source: .Blood Blood-Venous  Preliminary Report (02-19-25 @ 22:02):    No growth at 24 hours    Culture - Blood (collected 02-18-25 @ 16:00)  Source: .Blood Blood-Peripheral  Preliminary Report (02-19-25 @ 22:02):    No growth at 24 hours        Radiology--  ***  Active Medications--  amLODIPine   Tablet 10 milliGRAM(s) Oral daily  atorvastatin 20 milliGRAM(s) Oral at bedtime  cefepime   IVPB 1000 milliGRAM(s) IV Intermittent every 24 hours  chlorhexidine 0.12% Liquid 15 milliLiter(s) Oral Mucosa every 12 hours  chlorhexidine 2% Cloths 1 Application(s) Topical daily  dextrose 5%. 1000 milliLiter(s) IV Continuous <Continuous>  dextrose 5%. 1000 milliLiter(s) IV Continuous <Continuous>  dextrose 50% Injectable 25 Gram(s) IV Push once  dextrose 50% Injectable 12.5 Gram(s) IV Push once  dextrose 50% Injectable 25 Gram(s) IV Push once  dextrose Oral Gel 15 Gram(s) Oral once PRN  epoetin reilly-epbx (RETACRIT) Injectable 25025 Unit(s) IV Push <User Schedule>  ferrous    sulfate Liquid 300 milliGRAM(s) Enteral Tube daily  glucagon  Injectable 1 milliGRAM(s) IntraMuscular once  insulin glargine Injectable (LANTUS) 25 Unit(s) SubCutaneous at bedtime  insulin lispro (ADMELOG) corrective regimen sliding scale   SubCutaneous every 6 hours  minocycline IVPB 200 milliGRAM(s) IV Intermittent every 12 hours  Nephro-noam 1 Tablet(s) Oral daily  pantoprazole   Suspension 40 milliGRAM(s) Oral before breakfast  sodium chloride 0.9% Bolus. 100 milliLiter(s) IV Bolus every 5 minutes PRN    Antimicrobials:   cefepime   IVPB 1000 milliGRAM(s) IV Intermittent every 24 hours  minocycline IVPB 200 milliGRAM(s) IV Intermittent every 12 hours    Immunologic: epoetin reilly-epbx (RETACRIT) Injectable 05438 Unit(s) IV Push <User Schedule>

## 2025-02-20 NOTE — PROGRESS NOTE ADULT - ASSESSMENT
74-year-old male hx trach/vent - , CVA, COPD, stage renal disease on dialysis 4 times a week (MTRF) history of pressure ulcer.  Patient presents the ED today for elevated white count. nephrology consulted for dialysis needs    ESRD:  from [facility]  On HD MTTsF via an A-V fistula. s/p fistulogram by vascular 2/18  s/p hd 2/19 uf 2.3L  hd tmr. will keep MWF schedule while in hospital  consent from wife in dialysis unit  monitor electrolyte     htn  uncontrolled  restarted on norvasc 10 daily  monitor  restart hydralazine if bp>150    Anemia:  - Transfuse for Hg < 7.  epo with hd  iron sat >20  monitor    leukocytosis  sepsis work up per team    hyponatremia  in setting of esrd and hyperglycemia  optimize dm control  hd per schedule  monitor

## 2025-02-20 NOTE — PROGRESS NOTE ADULT - SUBJECTIVE AND OBJECTIVE BOX
[facility]   MD ELIANE BHAGAT MD ANGELA WONG, PA    TEL:  OFFICE: [phone]  From 5pm-7am Answering Service [phone]    -- RENAL FOLLOW UP NOTE ---Date of Service 02-20-25 @ 10:09    Patient is a 74y old  Male who presents with a chief complaint of Urinary tract infection     (19 Feb 2025 13:56)      Patient seen and examined at bedside.    VITALS:  T(F): 96.8 (02-20-25 @ 04:00), Max: 98.3 (02-19-25 @ 13:18)  HR: 67 (02-20-25 @ 07:45)  BP: 165/73 (02-20-25 @ 06:00)  RR: 16 (02-20-25 @ 04:00)  SpO2: 100% (02-20-25 @ 07:45)  Wt(kg): --    02-19 @ 07:01  -  02-20 @ 07:00  --------------------------------------------------------  IN: 740 mL / OUT: 2700 mL / NET: -1960 mL        Weight (kg): 53.8 (02-19 @ 12:20)    PHYSICAL EXAM:  General: Nonverbal  Neck: +trach  Respiratory: CTAB, no wheezes, rales or rhonchi  Cardiovascular: S1, S2, RRR  Gastrointestinal: +peg  Extremities: No peripheral edema    Hospital Medications:   MEDICATIONS  (STANDING):  amLODIPine   Tablet 10 milliGRAM(s) Oral daily  atorvastatin 20 milliGRAM(s) Oral at bedtime  cefepime   IVPB 1000 milliGRAM(s) IV Intermittent every 24 hours  chlorhexidine 0.12% Liquid 15 milliLiter(s) Oral Mucosa every 12 hours  chlorhexidine 2% Cloths 1 Application(s) Topical daily  dextrose 5%. 1000 milliLiter(s) (100 mL/Hr) IV Continuous <Continuous>  dextrose 5%. 1000 milliLiter(s) (50 mL/Hr) IV Continuous <Continuous>  dextrose 50% Injectable 25 Gram(s) IV Push once  dextrose 50% Injectable 12.5 Gram(s) IV Push once  dextrose 50% Injectable 25 Gram(s) IV Push once  epoetin reilly-epbx (RETACRIT) Injectable 62635 Unit(s) IV Push <User Schedule>  ferrous    sulfate Liquid 300 milliGRAM(s) Enteral Tube daily  glucagon  Injectable 1 milliGRAM(s) IntraMuscular once  insulin glargine Injectable (LANTUS) 25 Unit(s) SubCutaneous at bedtime  insulin lispro (ADMELOG) corrective regimen sliding scale   SubCutaneous every 6 hours  minocycline IVPB 200 milliGRAM(s) IV Intermittent every 12 hours  Nephro-noam 1 Tablet(s) Oral daily  pantoprazole   Suspension 40 milliGRAM(s) Oral before breakfast  potassium phosphate / sodium phosphate Powder (PHOS-NaK) 1 Packet(s) Oral once      LABS:  02-20    132[L]  |  92[L]  |  53[H]  ----------------------------<  216[H]  3.9   |  24  |  2.16[H]    Ca    9.7      20 Feb 2025 06:00  Phos  2.4     02-20  Mg     2.90     02-20    TPro      /  Alb      /  TBili      /  DBili      /  AST      /  ALT  12  /  AlkPhos      02-19    Creatinine Trend: 2.16 <--, 3.62 <--, 3.08 <--, 2.79 <--    Phosphorus: 2.4 mg/dL (02-20 @ 06:00)  Iron Total: 46 ug/dL (02-19 @ 11:39)  Iron - Total Binding Capacity.: 142 ug/dL (02-19 @ 11:39)  Ferritin: 3601 ng/mL (02-19 @ 11:39)                              9.8    11.40 )-----------( 213      ( 20 Feb 2025 06:00 )             32.1     Urine Studies:  Urinalysis - [02-20-25 @ 06:00]      Color  / Appearance  / SG  / pH       Gluc 216 / Ketone   / Bili  / Urobili        Blood  / Protein  / Leuk Est  / Nitrite       RBC  / WBC  / Hyaline  / Gran  / Sq Epi  / Non Sq Epi  / Bacteria       Iron 46, TIBC 142, %sat 32      [02-19-25 @ 11:39]  Ferritin 3601      [02-19-25 @ 11:39]  PTH -- (Ca --)      [05-26-24 @ 01:20]   122  TSH 1.660      [10-05-24 @ 08:37]  Lipid: chol --, , HDL --, LDL --      [10-18-24 @ 06:00]        RADIOLOGY & ADDITIONAL STUDIES:

## 2025-02-20 NOTE — CONSULT NOTE ADULT - ASSESSMENT
73 y/o M PMhx trach/vent (last changed 10/23/24), CVA x2 with residual R hemiplegia, COPD, ESRD on HD MWF who presented to ED for leukocytosis when initially planned for fistulogram and noted to have a WBC of 18.     VAP, leukocytosis  SARS-CoV-2/ RSV/ flu PCR negative  MRSA PCR negative  CXR- Right lower lobe infiltrate  prior resp cultures reviewed- pseudomonas and stenotrophomonas  UA w/ significant pyuria though noted epithelial cells indicating contamination  urine cx w/ E faecium  unable to assess urinary symptoms given patient's mentation, however, leukocytosis improving without coverage of E faecium    leukocytosis improving on current antibiotics  Recommendations  c/w cefepime 1g daily extended dosing for now  c/w minocycline for now  f/u resp culture  f/u blood cultures- NGTD  can repeat UA w/ reflex    Steven Torres M.D.  [facility]  [phone]  After 5pm on weekdays and all day on weekends - please call [phone]  Available on [employer] teams    Thank you for consulting us and involving us in the management of this patients case. In addition to reviewing history, imaging, documents, labs, microbiology, took into account antibiotic stewardship, local antibiogram and infection control strategies and potential transmission issues.  75 y/o M PMhx trach/vent (last changed 10/23/24), CVA x2 with residual R hemiplegia, COPD, ESRD on HD MWF who presented to ED for leukocytosis when initially planned for fistulogram and noted to have a WBC of 18.     VAP, leukocytosis  SARS-CoV-2/ RSV/ flu PCR negative  MRSA PCR negative  CXR- Right lower lobe infiltrate  prior resp cultures reviewed- pseudomonas and stenotrophomonas  UA w/ significant pyuria though noted epithelial cells indicating contamination  urine cx w/ E faecium  unable to assess urinary symptoms given patient's mentation, however, leukocytosis improving without coverage of E faecium    ESRD  HD per nephrologu    leukocytosis improving on current antibiotics  Recommendations  c/w cefepime 1g daily extended infusion dosing (renally adjusted) for now  c/w minocycline for now  f/u resp culture  f/u blood cultures- NGTD  can repeat UA w/ reflex    Steven Torres M.D.  [facility]  [phone]  After 5pm on weekdays and all day on weekends - please call [phone]  Available on [employer] teams    Thank you for consulting us and involving us in the management of this patients case. In addition to reviewing history, imaging, documents, labs, microbiology, took into account antibiotic stewardship, local antibiogram and infection control strategies and potential transmission issues.

## 2025-02-20 NOTE — PROGRESS NOTE ADULT - SUBJECTIVE AND OBJECTIVE BOX
CHIEF COMPLAINT:Patient is a 74y old  Male who presents with a chief complaint of Urinary tract infection     (19 Feb 2025 13:56)      INTERVAL EVENTS:     ROS: Seen by bedside during AM rounds     OBJECTIVE:  ICU Vital Signs Last 24 Hrs  T(C): 36 (20 Feb 2025 04:00), Max: 37.9 (19 Feb 2025 10:00)  T(F): 96.8 (20 Feb 2025 04:00), Max: 100.3 (19 Feb 2025 10:00)  HR: 74 (20 Feb 2025 06:00) (72 - 85)  BP: 165/73 (20 Feb 2025 06:00) (140/68 - 188/76)  BP(mean): 100 (19 Feb 2025 16:40) (87 - 100)  ABP: --  ABP(mean): --  RR: 16 (20 Feb 2025 04:00) (15 - 18)  SpO2: 100% (20 Feb 2025 04:00) (98% - 100%)    O2 Parameters below as of 20 Feb 2025 04:00  Patient On (Oxygen Delivery Method): ventilator    O2 Concentration (%): 40      Mode: AC/ CMV (Assist Control/ Continuous Mandatory Ventilation), RR (machine): 15, TV (machine): 400, FiO2: 40, PEEP: 5, ITime: 0.64, MAP: 9, PIP: 23    02-19 @ 07:01  -  02-20 @ 07:00  --------------------------------------------------------  IN: 740 mL / OUT: 2700 mL / NET: -1960 mL      CAPILLARY BLOOD GLUCOSE      POCT Blood Glucose.: 207 mg/dL (20 Feb 2025 05:52)      PHYSICAL EXAM:  General:   HEENT:   Lymph Nodes:  Neck:   Respiratory:   Cardiovascular:   Abdomen:   Extremities:   Skin:   Neurological:  Psychiatry:    Mode: AC/ CMV (Assist Control/ Continuous Mandatory Ventilation)  RR (machine): 15  TV (machine): 400  FiO2: 40  PEEP: 5  ITime: 0.64  MAP: 9  PIP: 23      HOSPITAL MEDICATIONS:  MEDICATIONS  (STANDING):  amLODIPine   Tablet 10 milliGRAM(s) Oral daily  atorvastatin 20 milliGRAM(s) Oral at bedtime  cefepime   IVPB 1000 milliGRAM(s) IV Intermittent every 24 hours  chlorhexidine 0.12% Liquid 15 milliLiter(s) Oral Mucosa every 12 hours  chlorhexidine 2% Cloths 1 Application(s) Topical daily  dextrose 5%. 1000 milliLiter(s) (100 mL/Hr) IV Continuous <Continuous>  dextrose 5%. 1000 milliLiter(s) (50 mL/Hr) IV Continuous <Continuous>  dextrose 50% Injectable 25 Gram(s) IV Push once  dextrose 50% Injectable 12.5 Gram(s) IV Push once  dextrose 50% Injectable 25 Gram(s) IV Push once  epoetin reilly-epbx (RETACRIT) Injectable 59950 Unit(s) IV Push <User Schedule>  ferrous    sulfate Liquid 300 milliGRAM(s) Enteral Tube daily  glucagon  Injectable 1 milliGRAM(s) IntraMuscular once  insulin glargine Injectable (LANTUS) 25 Unit(s) SubCutaneous at bedtime  insulin lispro (ADMELOG) corrective regimen sliding scale   SubCutaneous every 6 hours  minocycline IVPB 200 milliGRAM(s) IV Intermittent every 12 hours  Nephro-noam 1 Tablet(s) Oral daily  pantoprazole   Suspension 40 milliGRAM(s) Oral before breakfast    MEDICATIONS  (PRN):  dextrose Oral Gel 15 Gram(s) Oral once PRN Blood Glucose LESS THAN 70 milliGRAM(s)/deciliter  sodium chloride 0.9% Bolus. 100 milliLiter(s) IV Bolus every 5 minutes PRN SBP LESS THAN or EQUAL to 80 mmHg      LABS:                        9.8    11.40 )-----------( 213      ( 20 Feb 2025 06:00 )             32.1     02-19    132[L]  |  87[L]  |  96[H]  ----------------------------<  133[H]  3.8   |  25  |  3.62[H]    Ca    9.7      19 Feb 2025 06:45  Phos  3.1     02-19  Mg     3.20     02-19    TPro  x   /  Alb  x   /  TBili  x   /  DBili  x   /  AST  x   /  ALT  12  /  AlkPhos  x   02-19    PT/INR - ( 19 Feb 2025 06:45 )   PT: 12.7 sec;   INR: 1.07 ratio         PTT - ( 19 Feb 2025 06:45 )  PTT:28.9 sec  Urinalysis Basic - ( 19 Feb 2025 06:45 )    Color: x / Appearance: x / SG: x / pH: x  Gluc: 133 mg/dL / Ketone: x  / Bili: x / Urobili: x   Blood: x / Protein: x / Nitrite: x   Leuk Esterase: x / RBC: x / WBC x   Sq Epi: x / Non Sq Epi: x / Bacteria: x        Venous Blood Gas:  02-18 @ 16:00  7.34/65/32/35/48.7  VBG Lactate: 1.6   CHIEF COMPLAINT:Patient is a 74y old  Male who presents with a chief complaint of Urinary tract infection     (19 Feb 2025 13:56)      INTERVAL EVENTS:     ROS: Seen by bedside during AM rounds     OBJECTIVE:  ICU Vital Signs Last 24 Hrs  T(C): 36 (20 Feb 2025 04:00), Max: 37.9 (19 Feb 2025 10:00)  T(F): 96.8 (20 Feb 2025 04:00), Max: 100.3 (19 Feb 2025 10:00)  HR: 74 (20 Feb 2025 06:00) (72 - 85)  BP: 165/73 (20 Feb 2025 06:00) (140/68 - 188/76)  BP(mean): 100 (19 Feb 2025 16:40) (87 - 100)  ABP: --  ABP(mean): --  RR: 16 (20 Feb 2025 04:00) (15 - 18)  SpO2: 100% (20 Feb 2025 04:00) (98% - 100%)    O2 Parameters below as of 20 Feb 2025 04:00  Patient On (Oxygen Delivery Method): ventilator    O2 Concentration (%): 40      Mode: AC/ CMV (Assist Control/ Continuous Mandatory Ventilation), RR (machine): 15, TV (machine): 400, FiO2: 40, PEEP: 5, ITime: 0.64, MAP: 9, PIP: 23    02-19 @ 07:01  -  02-20 @ 07:00  --------------------------------------------------------  IN: 740 mL / OUT: 2700 mL / NET: -1960 mL      CAPILLARY BLOOD GLUCOSE      POCT Blood Glucose.: 207 mg/dL (20 Feb 2025 05:52)      PHYSICAL EXAM:  General: NAD   Neck: (+) Trach tube noted, site c/d/i.  Cards: S1/S2, no murmurs   Pulm: Coarse B S b/l. No resp distress.   Abdomen: Soft, NTND. (+) PEG noted, site c/d/i.   Extremities: R-forearm fistula with palpable thrill. No pedal edema. Extremities warm to touch.  Neurology: Appears withdrawn with eyes closed. Nonverbal. Not following commands.  Skin: warm to touch, color appropriate for ethnicity. Refer to RN assessment for further details.      Mode: AC/ CMV (Assist Control/ Continuous Mandatory Ventilation)  RR (machine): 15  TV (machine): 400  FiO2: 40  PEEP: 5  ITime: 0.64  MAP: 9  PIP: 23      HOSPITAL MEDICATIONS:  MEDICATIONS  (STANDING):  amLODIPine   Tablet 10 milliGRAM(s) Oral daily  atorvastatin 20 milliGRAM(s) Oral at bedtime  cefepime   IVPB 1000 milliGRAM(s) IV Intermittent every 24 hours  chlorhexidine 0.12% Liquid 15 milliLiter(s) Oral Mucosa every 12 hours  chlorhexidine 2% Cloths 1 Application(s) Topical daily  dextrose 5%. 1000 milliLiter(s) (100 mL/Hr) IV Continuous <Continuous>  dextrose 5%. 1000 milliLiter(s) (50 mL/Hr) IV Continuous <Continuous>  dextrose 50% Injectable 25 Gram(s) IV Push once  dextrose 50% Injectable 12.5 Gram(s) IV Push once  dextrose 50% Injectable 25 Gram(s) IV Push once  epoetin reilly-epbx (RETACRIT) Injectable 76520 Unit(s) IV Push <User Schedule>  ferrous    sulfate Liquid 300 milliGRAM(s) Enteral Tube daily  glucagon  Injectable 1 milliGRAM(s) IntraMuscular once  insulin glargine Injectable (LANTUS) 25 Unit(s) SubCutaneous at bedtime  insulin lispro (ADMELOG) corrective regimen sliding scale   SubCutaneous every 6 hours  minocycline IVPB 200 milliGRAM(s) IV Intermittent every 12 hours  Nephro-noam 1 Tablet(s) Oral daily  pantoprazole   Suspension 40 milliGRAM(s) Oral before breakfast    MEDICATIONS  (PRN):  dextrose Oral Gel 15 Gram(s) Oral once PRN Blood Glucose LESS THAN 70 milliGRAM(s)/deciliter  sodium chloride 0.9% Bolus. 100 milliLiter(s) IV Bolus every 5 minutes PRN SBP LESS THAN or EQUAL to 80 mmHg      LABS:                        9.8    11.40 )-----------( 213      ( 20 Feb 2025 06:00 )             32.1     02-19    132[L]  |  87[L]  |  96[H]  ----------------------------<  133[H]  3.8   |  25  |  3.62[H]    Ca    9.7      19 Feb 2025 06:45  Phos  3.1     02-19  Mg     3.20     02-19    TPro  x   /  Alb  x   /  TBili  x   /  DBili  x   /  AST  x   /  ALT  12  /  AlkPhos  x   02-19    PT/INR - ( 19 Feb 2025 06:45 )   PT: 12.7 sec;   INR: 1.07 ratio         PTT - ( 19 Feb 2025 06:45 )  PTT:28.9 sec  Urinalysis Basic - ( 19 Feb 2025 06:45 )    Color: x / Appearance: x / SG: x / pH: x  Gluc: 133 mg/dL / Ketone: x  / Bili: x / Urobili: x   Blood: x / Protein: x / Nitrite: x   Leuk Esterase: x / RBC: x / WBC x   Sq Epi: x / Non Sq Epi: x / Bacteria: x        Venous Blood Gas:  02-18 @ 16:00  7.34/65/32/35/48.7  VBG Lactate: 1.6   CHIEF COMPLAINT:Patient is a 74y old  Male who presents with a chief complaint of Urinary tract infection     (19 Feb 2025 13:56)      INTERVAL EVENTS: no overnight events noted.     ROS: Seen by bedside during AM rounds     OBJECTIVE:  ICU Vital Signs Last 24 Hrs  T(C): 36 (20 Feb 2025 04:00), Max: 37.9 (19 Feb 2025 10:00)  T(F): 96.8 (20 Feb 2025 04:00), Max: 100.3 (19 Feb 2025 10:00)  HR: 74 (20 Feb 2025 06:00) (72 - 85)  BP: 165/73 (20 Feb 2025 06:00) (140/68 - 188/76)  BP(mean): 100 (19 Feb 2025 16:40) (87 - 100)  ABP: --  ABP(mean): --  RR: 16 (20 Feb 2025 04:00) (15 - 18)  SpO2: 100% (20 Feb 2025 04:00) (98% - 100%)    O2 Parameters below as of 20 Feb 2025 04:00  Patient On (Oxygen Delivery Method): ventilator    O2 Concentration (%): 40      Mode: AC/ CMV (Assist Control/ Continuous Mandatory Ventilation), RR (machine): 15, TV (machine): 400, FiO2: 40, PEEP: 5, ITime: 0.64, MAP: 9, PIP: 23    02-19 @ 07:01  -  02-20 @ 07:00  --------------------------------------------------------  IN: 740 mL / OUT: 2700 mL / NET: -1960 mL      CAPILLARY BLOOD GLUCOSE      POCT Blood Glucose.: 207 mg/dL (20 Feb 2025 05:52)      PHYSICAL EXAM:  General: NAD   Neck: (+) Trach tube noted, site c/d/i.  Cards: S1/S2, no murmurs   Pulm: Coarse B S b/l. No resp distress.   Abdomen: Soft, NTND. (+) PEG noted, site c/d/i.   Extremities: R-forearm fistula with palpable thrill. No pedal edema. Extremities warm to touch.  Neurology: Appears withdrawn with eyes closed. Nonverbal. Not following commands.  Skin: warm to touch, color appropriate for ethnicity. Refer to RN assessment for further details.      Mode: AC/ CMV (Assist Control/ Continuous Mandatory Ventilation)  RR (machine): 15  TV (machine): 400  FiO2: 40  PEEP: 5  ITime: 0.64  MAP: 9  PIP: 23      HOSPITAL MEDICATIONS:  MEDICATIONS  (STANDING):  amLODIPine   Tablet 10 milliGRAM(s) Oral daily  atorvastatin 20 milliGRAM(s) Oral at bedtime  cefepime   IVPB 1000 milliGRAM(s) IV Intermittent every 24 hours  chlorhexidine 0.12% Liquid 15 milliLiter(s) Oral Mucosa every 12 hours  chlorhexidine 2% Cloths 1 Application(s) Topical daily  dextrose 5%. 1000 milliLiter(s) (100 mL/Hr) IV Continuous <Continuous>  dextrose 5%. 1000 milliLiter(s) (50 mL/Hr) IV Continuous <Continuous>  dextrose 50% Injectable 25 Gram(s) IV Push once  dextrose 50% Injectable 12.5 Gram(s) IV Push once  dextrose 50% Injectable 25 Gram(s) IV Push once  epoetin reilly-epbx (RETACRIT) Injectable 57252 Unit(s) IV Push <User Schedule>  ferrous    sulfate Liquid 300 milliGRAM(s) Enteral Tube daily  glucagon  Injectable 1 milliGRAM(s) IntraMuscular once  insulin glargine Injectable (LANTUS) 25 Unit(s) SubCutaneous at bedtime  insulin lispro (ADMELOG) corrective regimen sliding scale   SubCutaneous every 6 hours  minocycline IVPB 200 milliGRAM(s) IV Intermittent every 12 hours  Nephro-noam 1 Tablet(s) Oral daily  pantoprazole   Suspension 40 milliGRAM(s) Oral before breakfast    MEDICATIONS  (PRN):  dextrose Oral Gel 15 Gram(s) Oral once PRN Blood Glucose LESS THAN 70 milliGRAM(s)/deciliter  sodium chloride 0.9% Bolus. 100 milliLiter(s) IV Bolus every 5 minutes PRN SBP LESS THAN or EQUAL to 80 mmHg      LABS:                        9.8    11.40 )-----------( 213      ( 20 Feb 2025 06:00 )             32.1     02-19    132[L]  |  87[L]  |  96[H]  ----------------------------<  133[H]  3.8   |  25  |  3.62[H]    Ca    9.7      19 Feb 2025 06:45  Phos  3.1     02-19  Mg     3.20     02-19    TPro  x   /  Alb  x   /  TBili  x   /  DBili  x   /  AST  x   /  ALT  12  /  AlkPhos  x   02-19    PT/INR - ( 19 Feb 2025 06:45 )   PT: 12.7 sec;   INR: 1.07 ratio         PTT - ( 19 Feb 2025 06:45 )  PTT:28.9 sec  Urinalysis Basic - ( 19 Feb 2025 06:45 )    Color: x / Appearance: x / SG: x / pH: x  Gluc: 133 mg/dL / Ketone: x  / Bili: x / Urobili: x   Blood: x / Protein: x / Nitrite: x   Leuk Esterase: x / RBC: x / WBC x   Sq Epi: x / Non Sq Epi: x / Bacteria: x        Venous Blood Gas:  02-18 @ 16:00  7.34/65/32/35/48.7  VBG Lactate: 1.6

## 2025-02-20 NOTE — PROGRESS NOTE ADULT - NS ATTEND AMEND GEN_ALL_CORE FT
74-year-old man with a past medical history of CVA with residual hemiplegia COPD, ESRD on dialysis, chronic hypoxic hypercapnic respiratory failure tracheostomy and vent dependent, PEG in place, pressure ulcer was sent to the ED for leukocytosis.  Patient initially presented to the Cath Lab for fistulogram with vascular surgery.  He was found to have a white count and sent to the emergency room.  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   -

## 2025-02-20 NOTE — PROGRESS NOTE ADULT - SUBJECTIVE AND OBJECTIVE BOX
TEAM [ C ] Surgery Daily Progress Note  =====================================================  SUBJECTIVE: Patient seen and examined at bedside on AM rounds. Pt tolerated HD without issue 2/19.    PMH:   PAST MEDICAL & SURGICAL HISTORY:  ESRD on hemodialysis    HTN (hypertension)    Insulin dependent type 2 diabetes mellitus    History of cerebrovascular accident (CVA) with residual deficit    History of DVT of lower extremity    HLD (hyperlipidemia)    CVA (cerebrovascular accident)    Aphasia    Tracheostomy in place    PEG (percutaneous endoscopic gastrostomy) status    GERD (gastroesophageal reflux disease)    Constipation    Pressure ulcer    Arteriovenous fistula    S/P percutaneous endoscopic gastrostomy (PEG) tube placement    History of tracheostomy    ALLERGIES:  No Known Allergies  -------------------------------------------------------------------------------------  MEDICATIONS:    Neurologic Medications    Respiratory Medications    Cardiovascular Medications  amLODIPine   Tablet 10 milliGRAM(s) Oral daily    Gastrointestinal Medications  dextrose 5%. 1000 milliLiter(s) IV Continuous <Continuous>  dextrose 5%. 1000 milliLiter(s) IV Continuous <Continuous>  ferrous    sulfate Liquid 300 milliGRAM(s) Enteral Tube daily  Nephro-noam 1 Tablet(s) Oral daily  pantoprazole   Suspension 40 milliGRAM(s) Oral before breakfast  potassium phosphate / sodium phosphate Powder (PHOS-NaK) 1 Packet(s) Oral once  sodium chloride 0.9% Bolus. 100 milliLiter(s) IV Bolus every 5 minutes PRN SBP LESS THAN or EQUAL to 80 mmHg    Genitourinary Medications    Hematologic/Oncologic Medications  epoetin reilly-epbx (RETACRIT) Injectable 22500 Unit(s) IV Push <User Schedule>    Antimicrobial/Immunologic Medications  cefepime   IVPB 1000 milliGRAM(s) IV Intermittent every 24 hours  minocycline IVPB 200 milliGRAM(s) IV Intermittent every 12 hours    Endocrine/Metabolic Medications  atorvastatin 20 milliGRAM(s) Oral at bedtime  dextrose 50% Injectable 25 Gram(s) IV Push once  dextrose 50% Injectable 12.5 Gram(s) IV Push once  dextrose 50% Injectable 25 Gram(s) IV Push once  dextrose Oral Gel 15 Gram(s) Oral once PRN Blood Glucose LESS THAN 70 milliGRAM(s)/deciliter  glucagon  Injectable 1 milliGRAM(s) IntraMuscular once  insulin glargine Injectable (LANTUS) 25 Unit(s) SubCutaneous at bedtime  insulin lispro (ADMELOG) corrective regimen sliding scale   SubCutaneous every 6 hours    Topical/Other Medications  chlorhexidine 0.12% Liquid 15 milliLiter(s) Oral Mucosa every 12 hours  chlorhexidine 2% Cloths 1 Application(s) Topical daily  --------------------------------------------------------------------------------------  VITAL SIGNS:    T(C): 36 (02-20-25 @ 04:00), Max: 36.8 (02-19-25 @ 13:18)  HR: 67 (02-20-25 @ 07:45) (67 - 85)  BP: 165/73 (02-20-25 @ 06:00) (140/68 - 188/76)  RR: 16 (02-20-25 @ 04:00) (15 - 18)  SpO2: 100% (02-20-25 @ 07:45) (98% - 100%)    --------------------------------------------------------------------------------------  PHYSICAL EXAM:  General: No acute distress, on ventilator via tracheostomy  Respiratory: Nonlabored, trach in place  RUE: palpable radial pulse, aneurysmal AVF with palpable thrill, contracted, R hand warm, unable to cooperate with motor exam  --------------------------------------------------------------------------------------

## 2025-02-20 NOTE — PROGRESS NOTE ADULT - ASSESSMENT
ASSESSMENT/PLAN  73 yo M with hx trach/vent, CVA (nonverbal at baseline), COPD, ESRD on HD MTTS, pressure ulcer admitted to [facility] for management of trach/vent iso leukocytosis 2/2 likely UTI.     NEUROLOGY  #Nonverbal  #Hx CVA  - A&Ox0 at baseline, nonverbal but able to make some needs known  - continue to monitor    PULMONARY  #Trach/Vent  #COPD  - vent settings :400/15/5/40  - respiratory support as needed  - no symptoms  - RVP negative  - f/u CXR  - duonebs q6    CARDIOVASCULAR  #HTN  #HLD  - no evidence of septic shock  - hold amlodipine 10 mg q24, hydralazine 25 mg TID. hydralizine 50 mg daily, nifedipine 90 mg ER   - atorvastatin 20 mg daily    RENAL  #ESRD ON HD (M/T/TH/S)  #AV fistulogram (RUE)  #UTI  - on 3 day/week HD (last 2/17)  - will need HD as inpatient  - midodrine 10 mg on dialysis days  - Nephrology following   - monitor electrolytes, replete PRN ( K, phos, Mg)  - UTI treatment per ID section, no hydro or obstructive symptoms  - S/P AV fistulogram with angioplasty (2/19) with Vascular Surgery    GASTROINTESTINAL  #GERD  #PEG tube  - on omeprazole 40 mg q24    INFECTIOUS DISEASE  #UTI  - CTX 1g q24  - f/u BCx  - f/u UCx    ENDOCRINE  #DM  - glargine 25u qHS with SSI q6    HEME  #anemia  #hx DVT  - anemia likely AOCD iso ESRD and chronic renal disease  - TIBC and Ferritin high with total Iron borderline (46)  - at baseline 7.2  - trend CBC  - c/w ferrous sulfate 220 mg q24  - Drop in HGB requiring 1U PRBC (2/19)  - Pending RUE US r/o active hematoma given pitting edema near medial R-elbow    FLUIDS/ELECTROLYTES/NUTRITION  # Diet: PEG with tube feeds  # IVF: none  # Monitor, Replete to K>4 and Mg>2  # Transfuse for Hgb <7, Plt<10    PROPHYLAXIS  # DVT: SCD  # GI: per GI section    DISPO: RCU  CODE STATUS: Presumed full code, will clarify with pt/family   ASSESSMENT/PLAN  75 yo M with hx trach/vent, CVA (nonverbal at baseline), COPD, ESRD on HD MTTS, pressure ulcer admitted to [facility] for management of trach/vent iso leukocytosis 2/2 likely UTI.     NEUROLOGY  #Nonverbal  #Hx CVA  - A&Ox0 at baseline, nonverbal but able to make some needs known  - continue to monitor    PULMONARY  #Trach/Vent  #COPD  - vent settings :400/15/5/40  - respiratory support as needed  - no symptoms  - RVP negative  - f/u CXR  - duonebs q6    CARDIOVASCULAR  #HTN  #HLD  - no evidence of septic shock  - hold amlodipine 10 mg q24, hydralazine 75 TID  - Norvasc resumed 2/20   - atorvastatin 20 mg daily    RENAL  #ESRD ON HD (M/T/TH/S)  #AV fistulogram (RUE)  #UTI  - on 3 day/week HD (last 2/17)  - will need HD as inpatient  - midodrine 10 mg on dialysis days  - Nephrology following   - monitor electrolytes, replete PRN ( K, phos, Mg)  - UTI treatment per ID section, no hydro or obstructive symptoms  - S/P AV fistulogram with angioplasty (2/19) with Vascular Surgery    GASTROINTESTINAL  #GERD  #PEG tube  - on omeprazole 40 mg q24    INFECTIOUS DISEASE  #UTI  - S/P CTX switched to Cefepime/Minocycline (2/20-  )  - f/u BCx  - f/u UCx    ENDOCRINE  #DM  - glargine 25u qHS with SSI q6    HEME  #anemia  #hx DVT  - anemia likely AOCD iso ESRD and chronic renal disease  - TIBC and Ferritin high with total Iron borderline (46)  - at baseline 7.2  - trend CBC  - c/w ferrous sulfate 220 mg q24  - Drop in HGB requiring 1U PRBC (2/19)  - Pending RUE US r/o active hematoma given pitting edema near medial R-elbow    FLUIDS/ELECTROLYTES/NUTRITION  # Diet: PEG with tube feeds  # IVF: none  # Monitor, Replete to K>4 and Mg>2  # Transfuse for Hgb <7, Plt<10    PROPHYLAXIS  # DVT: SCD  # GI: per GI section    DISPO: RCU  CODE STATUS: Presumed full code, will clarify with pt/family

## 2025-02-20 NOTE — PROGRESS NOTE ADULT - ASSESSMENT
74M PMHx HTN, IDDM2, CVA x2 ( with R. sided weakness and dysphagia s/p PEG tube), bedbound with multiple pressure ulcers, hx of RLE DVT (s/p apixaban course), recent admission for respiratory failure s/p tracheostomy (10/23/24), ESRD on HD ([facility]) now s/p RUE fistulogram with angioplasty (02/18/25, [facility]) found to have profound hyperglycemia, anemia, and leukocytosis admitted to RCU.     Recs:  - Continue to use AVF for HD  - Please call vascular surgery back if prolonged bleeding s/p cessation of HD catheter or issues with HD    Vascular Surgery  [phone]

## 2025-02-21 LAB
-  AZTREONAM: SIGNIFICANT CHANGE UP
-  CEFEPIME: SIGNIFICANT CHANGE UP
-  CEFTAZIDIME/AVIBACTAM: SIGNIFICANT CHANGE UP
-  CEFTAZIDIME: SIGNIFICANT CHANGE UP
-  CEFTOLOZANE/TAZOBACTAM: SIGNIFICANT CHANGE UP
-  CIPROFLOXACIN: SIGNIFICANT CHANGE UP
-  IMIPENEM: SIGNIFICANT CHANGE UP
-  LEVOFLOXACIN: SIGNIFICANT CHANGE UP
-  MEROPENEM: SIGNIFICANT CHANGE UP
-  PIPERACILLIN/TAZOBACTAM: SIGNIFICANT CHANGE UP
ANION GAP SERPL CALC-SCNC: 14 MMOL/L — SIGNIFICANT CHANGE UP (ref 7–14)
ANION GAP SERPL CALC-SCNC: 17 MMOL/L — HIGH (ref 7–14)
APTT BLD: 32.2 SEC — SIGNIFICANT CHANGE UP (ref 24.5–35.6)
BASOPHILS # BLD AUTO: 0.04 K/UL — SIGNIFICANT CHANGE UP (ref 0–0.2)
BASOPHILS NFR BLD AUTO: 0.3 % — SIGNIFICANT CHANGE UP (ref 0–2)
BLANDM BLD POS QL PROBE: SIGNIFICANT CHANGE UP
BLOOD GAS VENOUS COMPREHENSIVE RESULT: SIGNIFICANT CHANGE UP
BUN SERPL-MCNC: 31 MG/DL — HIGH (ref 7–23)
BUN SERPL-MCNC: 81 MG/DL — HIGH (ref 7–23)
CALCIUM SERPL-MCNC: 9.1 MG/DL — SIGNIFICANT CHANGE UP (ref 8.4–10.5)
CALCIUM SERPL-MCNC: 9.5 MG/DL — SIGNIFICANT CHANGE UP (ref 8.4–10.5)
CHLORIDE SERPL-SCNC: 86 MMOL/L — LOW (ref 98–107)
CHLORIDE SERPL-SCNC: 96 MMOL/L — LOW (ref 98–107)
CO2 SERPL-SCNC: 25 MMOL/L — SIGNIFICANT CHANGE UP (ref 22–31)
CO2 SERPL-SCNC: 26 MMOL/L — SIGNIFICANT CHANGE UP (ref 22–31)
CREAT SERPL-MCNC: 1.39 MG/DL — HIGH (ref 0.5–1.3)
CREAT SERPL-MCNC: 2.88 MG/DL — HIGH (ref 0.5–1.3)
CULTURE RESULTS: ABNORMAL
EGFR: 22 ML/MIN/1.73M2 — LOW
EGFR: 22 ML/MIN/1.73M2 — LOW
EGFR: 54 ML/MIN/1.73M2 — LOW
EGFR: 54 ML/MIN/1.73M2 — LOW
EOSINOPHIL # BLD AUTO: 0.51 K/UL — HIGH (ref 0–0.5)
EOSINOPHIL NFR BLD AUTO: 3.5 % — SIGNIFICANT CHANGE UP (ref 0–6)
GLUCOSE BLDC GLUCOMTR-MCNC: 215 MG/DL — HIGH (ref 70–99)
GLUCOSE BLDC GLUCOMTR-MCNC: 216 MG/DL — HIGH (ref 70–99)
GLUCOSE BLDC GLUCOMTR-MCNC: 270 MG/DL — HIGH (ref 70–99)
GLUCOSE BLDC GLUCOMTR-MCNC: 291 MG/DL — HIGH (ref 70–99)
GLUCOSE BLDC GLUCOMTR-MCNC: 298 MG/DL — HIGH (ref 70–99)
GLUCOSE SERPL-MCNC: 166 MG/DL — HIGH (ref 70–99)
GLUCOSE SERPL-MCNC: 259 MG/DL — HIGH (ref 70–99)
HCT VFR BLD CALC: 27.7 % — LOW (ref 39–50)
HGB BLD-MCNC: 8.6 G/DL — LOW (ref 13–17)
IANC: 11.69 K/UL — HIGH (ref 1.8–7.4)
IMM GRANULOCYTES NFR BLD AUTO: 0.6 % — SIGNIFICANT CHANGE UP (ref 0–0.9)
INR BLD: 1.05 RATIO — SIGNIFICANT CHANGE UP (ref 0.85–1.16)
LYMPHOCYTES # BLD AUTO: 1.11 K/UL — SIGNIFICANT CHANGE UP (ref 1–3.3)
LYMPHOCYTES # BLD AUTO: 7.7 % — LOW (ref 13–44)
MAGNESIUM SERPL-MCNC: 2.7 MG/DL — HIGH (ref 1.6–2.6)
MAGNESIUM SERPL-MCNC: 3.2 MG/DL — HIGH (ref 1.6–2.6)
MCHC RBC-ENTMCNC: 23.3 PG — LOW (ref 27–34)
MCHC RBC-ENTMCNC: 31 G/DL — LOW (ref 32–36)
MCV RBC AUTO: 75.1 FL — LOW (ref 80–100)
METHOD TYPE: SIGNIFICANT CHANGE UP
METHOD TYPE: SIGNIFICANT CHANGE UP
MONOCYTES NFR BLD AUTO: 6.9 % — SIGNIFICANT CHANGE UP (ref 2–14)
NEUTROPHILS # BLD AUTO: 11.69 K/UL — HIGH (ref 1.8–7.4)
NEUTROPHILS NFR BLD AUTO: 81 % — HIGH (ref 43–77)
NRBC # BLD AUTO: 0.11 K/UL — HIGH (ref 0–0)
NRBC # FLD: 0.11 K/UL — HIGH (ref 0–0)
NRBC BLD AUTO-RTO: 0 /100 WBCS — SIGNIFICANT CHANGE UP (ref 0–0)
ORGANISM # SPEC MICROSCOPIC CNT: ABNORMAL
ORGANISM # SPEC MICROSCOPIC CNT: ABNORMAL
PHOSPHATE SERPL-MCNC: 1.5 MG/DL — LOW (ref 2.5–4.5)
PHOSPHATE SERPL-MCNC: 2.4 MG/DL — LOW (ref 2.5–4.5)
PLATELET # BLD AUTO: 209 K/UL — SIGNIFICANT CHANGE UP (ref 150–400)
POTASSIUM SERPL-MCNC: 3.5 MMOL/L — SIGNIFICANT CHANGE UP (ref 3.5–5.3)
POTASSIUM SERPL-MCNC: 3.6 MMOL/L — SIGNIFICANT CHANGE UP (ref 3.5–5.3)
POTASSIUM SERPL-SCNC: 3.5 MMOL/L — SIGNIFICANT CHANGE UP (ref 3.5–5.3)
POTASSIUM SERPL-SCNC: 3.6 MMOL/L — SIGNIFICANT CHANGE UP (ref 3.5–5.3)
PROTHROM AB SERPL-ACNC: 12.2 SEC — SIGNIFICANT CHANGE UP (ref 9.9–13.4)
RBC # BLD: 3.69 M/UL — LOW (ref 4.2–5.8)
RBC # FLD: 19.7 % — HIGH (ref 10.3–14.5)
SODIUM SERPL-SCNC: 128 MMOL/L — LOW (ref 135–145)
SODIUM SERPL-SCNC: 136 MMOL/L — SIGNIFICANT CHANGE UP (ref 135–145)
VANCOMYCIN TROUGH SERPL-MCNC: 10.7 UG/ML — SIGNIFICANT CHANGE UP (ref 10–20)
WBC # BLD: 14.43 K/UL — HIGH (ref 3.8–10.5)
WBC # FLD AUTO: 14.43 K/UL — HIGH (ref 3.8–10.5)

## 2025-02-21 PROCEDURE — 71045 X-RAY EXAM CHEST 1 VIEW: CPT | Mod: 26

## 2025-02-21 PROCEDURE — 99233 SBSQ HOSP IP/OBS HIGH 50: CPT | Mod: FS

## 2025-02-21 RX ORDER — SOD PHOS DI, MONO/K PHOS MONO 250 MG
1 TABLET ORAL ONCE
Refills: 0 | Status: COMPLETED | OUTPATIENT
Start: 2025-02-21 | End: 2025-02-21

## 2025-02-21 RX ORDER — HEPARIN SODIUM 1000 [USP'U]/ML
750 INJECTION INTRAVENOUS; SUBCUTANEOUS
Qty: 25000 | Refills: 0 | Status: DISCONTINUED | OUTPATIENT
Start: 2025-02-21 | End: 2025-02-22

## 2025-02-21 RX ORDER — INSULIN GLARGINE-YFGN 100 [IU]/ML
28 INJECTION, SOLUTION SUBCUTANEOUS AT BEDTIME
Refills: 0 | Status: DISCONTINUED | OUTPATIENT
Start: 2025-02-21 | End: 2025-02-22

## 2025-02-21 RX ORDER — ONDANSETRON HCL/PF 4 MG/2 ML
4 VIAL (ML) INJECTION ONCE
Refills: 0 | Status: COMPLETED | OUTPATIENT
Start: 2025-02-21 | End: 2025-02-21

## 2025-02-21 RX ORDER — IPRATROPIUM BROMIDE AND ALBUTEROL SULFATE .5; 2.5 MG/3ML; MG/3ML
3 SOLUTION RESPIRATORY (INHALATION) EVERY 6 HOURS
Refills: 0 | Status: DISCONTINUED | OUTPATIENT
Start: 2025-02-21 | End: 2025-03-03

## 2025-02-21 RX ORDER — VANCOMYCIN HCL IN 5 % DEXTROSE 1.5G/250ML
1000 PLASTIC BAG, INJECTION (ML) INTRAVENOUS ONCE
Refills: 0 | Status: COMPLETED | OUTPATIENT
Start: 2025-02-21 | End: 2025-02-21

## 2025-02-21 RX ADMIN — INSULIN LISPRO 2: 100 INJECTION, SOLUTION INTRAVENOUS; SUBCUTANEOUS at 23:01

## 2025-02-21 RX ADMIN — HEPARIN SODIUM 7.5 UNIT(S)/HR: 1000 INJECTION INTRAVENOUS; SUBCUTANEOUS at 23:40

## 2025-02-21 RX ADMIN — Medication 250 MILLIGRAM(S): at 18:31

## 2025-02-21 RX ADMIN — Medication 25 MILLIGRAM(S): at 22:01

## 2025-02-21 RX ADMIN — Medication 1 TABLET(S): at 11:43

## 2025-02-21 RX ADMIN — INSULIN LISPRO 2: 100 INJECTION, SOLUTION INTRAVENOUS; SUBCUTANEOUS at 18:30

## 2025-02-21 RX ADMIN — Medication 4 MILLILITER(S): at 20:00

## 2025-02-21 RX ADMIN — EPOETIN ALFA 10000 UNIT(S): 10000 SOLUTION INTRAVENOUS; SUBCUTANEOUS at 20:44

## 2025-02-21 RX ADMIN — Medication 15 MILLILITER(S): at 05:40

## 2025-02-21 RX ADMIN — INSULIN GLARGINE-YFGN 28 UNIT(S): 100 INJECTION, SOLUTION SUBCUTANEOUS at 23:00

## 2025-02-21 RX ADMIN — CEFEPIME 33.33 MILLIGRAM(S): 2 INJECTION, POWDER, FOR SOLUTION INTRAVENOUS at 22:01

## 2025-02-21 RX ADMIN — Medication 40 MILLIGRAM(S): at 05:40

## 2025-02-21 RX ADMIN — INSULIN GLARGINE-YFGN 25 UNIT(S): 100 INJECTION, SOLUTION SUBCUTANEOUS at 00:26

## 2025-02-21 RX ADMIN — Medication 250 MILLIGRAM(S): at 05:40

## 2025-02-21 RX ADMIN — IPRATROPIUM BROMIDE AND ALBUTEROL SULFATE 3 MILLILITER(S): .5; 2.5 SOLUTION RESPIRATORY (INHALATION) at 15:10

## 2025-02-21 RX ADMIN — Medication 300 MILLIGRAM(S): at 11:43

## 2025-02-21 RX ADMIN — INSULIN LISPRO 3: 100 INJECTION, SOLUTION INTRAVENOUS; SUBCUTANEOUS at 11:42

## 2025-02-21 RX ADMIN — Medication 25 MILLIGRAM(S): at 07:56

## 2025-02-21 RX ADMIN — Medication 4 MILLIGRAM(S): at 22:01

## 2025-02-21 RX ADMIN — INSULIN LISPRO 3: 100 INJECTION, SOLUTION INTRAVENOUS; SUBCUTANEOUS at 05:41

## 2025-02-21 RX ADMIN — Medication 250 MILLIGRAM(S): at 11:51

## 2025-02-21 RX ADMIN — INSULIN LISPRO 3: 100 INJECTION, SOLUTION INTRAVENOUS; SUBCUTANEOUS at 00:27

## 2025-02-21 RX ADMIN — Medication 25 MILLIGRAM(S): at 14:39

## 2025-02-21 RX ADMIN — Medication 1 APPLICATION(S): at 11:43

## 2025-02-21 RX ADMIN — AMLODIPINE BESYLATE 10 MILLIGRAM(S): 10 TABLET ORAL at 05:40

## 2025-02-21 RX ADMIN — IPRATROPIUM BROMIDE AND ALBUTEROL SULFATE 3 MILLILITER(S): .5; 2.5 SOLUTION RESPIRATORY (INHALATION) at 20:00

## 2025-02-21 RX ADMIN — Medication 15 MILLILITER(S): at 18:30

## 2025-02-21 RX ADMIN — ATORVASTATIN CALCIUM 20 MILLIGRAM(S): 80 TABLET, FILM COATED ORAL at 22:01

## 2025-02-21 RX ADMIN — Medication 1 TABLET(S): at 11:52

## 2025-02-21 NOTE — PROGRESS NOTE ADULT - ASSESSMENT
75 y/o M PMhx trach/vent (last changed 10/23/24), CVA x2 with residual R hemiplegia, COPD, ESRD on HD MWF who presented to ED for leukocytosis when initially planned for fistulogram and noted to have a WBC of 18.     VAP, leukocytosis  SARS-CoV-2/ RSV/ flu PCR negative  MRSA PCR negative  CXR- Right lower lobe infiltrate  blood cultures- NGTD  prior resp cultures reviewed- pseudomonas and stenotrophomonas, sensitivities reviewed    possible UTI  UA w/ significant pyuria though noted epithelial cells indicating contamination  urine cx w/ E faecium  unable to assess urinary symptoms given patient's mentation  on repeat UA pyuria significantly improved without coverage of E faecium    DVT  RUE US- age-indeterminate, non-occlusive deep vein thrombosis noted in the right brachial vein    ESRD  HD per nephrology    leukocytosis increased today  s/p vanc 1g x 1 given (noted prior to DH)  give vanc 500mg after HD today    Recommendations  c/w cefepime 1g daily extended infusion dosing (renally adjusted) for now  c/w minocycline for now  f/u resp culture  f/u repeat urine culture  check vanc level prior to next HD session on 2/24  trend temps, wbc    Dr. Kenton Fox will be covering 2/22 & 2/23. I will resume care 2/24.   Steven Torres M.D.  [facility]  [phone]  After 5pm on weekdays and all day on weekends - please call [phone]  Available on [employer] teams    Thank you for consulting us and involving us in the management of this patients case. In addition to reviewing history, imaging, documents, labs, microbiology, took into account antibiotic stewardship, local antibiogram and infection control strategies and potential transmission issues.

## 2025-02-21 NOTE — PROGRESS NOTE ADULT - SUBJECTIVE AND OBJECTIVE BOX
[facility] [place] [facility]   MD ELIANE BHAGAT MD ANGELA WONG, PA    TEL:  OFFICE: [phone]  From 5pm-7am Answering Service [phone]    -- RENAL FOLLOW UP NOTE ---Date of Service 02-21-25 @ 10:01    Patient is a 74y old  Male who presents with a chief complaint of leukocytosis (20 Feb 2025 13:41)      Patient seen and examined at bedside.     VITALS:  T(F): 97.6 (02-21-25 @ 05:40), Max: 98.5 (02-20-25 @ 20:00)  HR: 71 (02-21-25 @ 07:44)  BP: 153/66 (02-21-25 @ 05:40)  RR: 16 (02-21-25 @ 05:40)  SpO2: 99% (02-21-25 @ 07:44)  Wt(kg): --    02-20 @ 07:01  -  02-21 @ 07:00  --------------------------------------------------------  IN: 780 mL / OUT: 0 mL / NET: 780 mL          PHYSICAL EXAM:  General: Nonverbal, eyes open  Neck: +trach  Respiratory: CTAB, no wheezes, rales or rhonchi  Cardiovascular: S1, S2, RRR  Gastrointestinal: +peg  Extremities: No peripheral edema    Hospital Medications:   MEDICATIONS  (STANDING):  amLODIPine   Tablet 10 milliGRAM(s) Oral daily  atorvastatin 20 milliGRAM(s) Oral at bedtime  cefepime   IVPB 1000 milliGRAM(s) IV Intermittent every 24 hours  chlorhexidine 0.12% Liquid 15 milliLiter(s) Oral Mucosa every 12 hours  chlorhexidine 2% Cloths 1 Application(s) Topical daily  dextrose 5%. 1000 milliLiter(s) (100 mL/Hr) IV Continuous <Continuous>  dextrose 5%. 1000 milliLiter(s) (50 mL/Hr) IV Continuous <Continuous>  dextrose 50% Injectable 25 Gram(s) IV Push once  dextrose 50% Injectable 12.5 Gram(s) IV Push once  dextrose 50% Injectable 25 Gram(s) IV Push once  epoetin reilly-epbx (RETACRIT) Injectable 57937 Unit(s) IV Push <User Schedule>  ferrous    sulfate Liquid 300 milliGRAM(s) Enteral Tube daily  glucagon  Injectable 1 milliGRAM(s) IntraMuscular once  hydrALAZINE 25 milliGRAM(s) Oral three times a day  insulin glargine Injectable (LANTUS) 28 Unit(s) SubCutaneous at bedtime  insulin lispro (ADMELOG) corrective regimen sliding scale   SubCutaneous every 6 hours  minocycline IVPB 200 milliGRAM(s) IV Intermittent every 12 hours  Nephro-noam 1 Tablet(s) Oral daily  pantoprazole   Suspension 40 milliGRAM(s) Oral before breakfast  potassium phosphate / sodium phosphate Tablet (K-PHOS No. 2) 1 Tablet(s) Oral once  vancomycin  IVPB 1000 milliGRAM(s) IV Intermittent once      LABS:  02-21    128[L]  |  86[L]  |  81[H]  ----------------------------<  259[H]  3.5   |  25  |  2.88[H]    Ca    9.5      21 Feb 2025 04:50  Phos  2.4     02-21  Mg     3.20     02-21    TPro      /  Alb      /  TBili      /  DBili      /  AST      /  ALT  12  /  AlkPhos      02-19    Creatinine Trend: 2.88 <--, 2.16 <--, 3.62 <--, 3.08 <--, 2.79 <--    Phosphorus: 2.4 mg/dL (02-21 @ 04:50)                              8.6    14.43 )-----------( 209      ( 21 Feb 2025 04:50 )             27.7     Urine Studies:  Urinalysis - [02-21-25 @ 04:50]      Color  / Appearance  / SG  / pH       Gluc 259 / Ketone   / Bili  / Urobili        Blood  / Protein  / Leuk Est  / Nitrite       RBC  / WBC  / Hyaline  / Gran  / Sq Epi  / Non Sq Epi  / Bacteria       Iron 46, TIBC 142, %sat 32      [02-19-25 @ 11:39]  Ferritin 3601      [02-19-25 @ 11:39]  PTH -- (Ca --)      [05-26-24 @ 01:20]   122  TSH 1.660      [10-05-24 @ 08:37]  Lipid: chol --, , HDL --, LDL --      [10-18-24 @ 06:00]    HBsAb 654.8      [02-19-25 @ 19:05]  HBsAg Nonreact      [02-19-25 @ 19:05]  HBcAb Nonreact      [02-19-25 @ 19:05]  HCV 0.19, Nonreact      [02-19-25 @ 19:05]      RADIOLOGY & ADDITIONAL STUDIES:

## 2025-02-21 NOTE — PROGRESS NOTE ADULT - SUBJECTIVE AND OBJECTIVE BOX
[facility]  AUGUST Carbajal Y. Patel, S. Shah, G. Casimir  [phone]  ([phone] - weekdays after 5pm and weekends)    Name: JIMMY BLAND  Age/Gender: 74y Male  MRN: [id number]    Interval History:  Notes reviewed.   Afebrile.   remains on vent    Allergies: No Known Allergies      Objective:  Vitals:   T(F): 97.6 (02-21-25 @ 05:40), Max: 98.5 (02-20-25 @ 20:00)  HR: 72 (02-21-25 @ 11:30) (67 - 84)  BP: 153/66 (02-21-25 @ 05:40) (138/66 - 171/72)  RR: 16 (02-21-25 @ 05:40) (14 - 18)  SpO2: 96% (02-21-25 @ 11:30) (96% - 100%)  Physical Examination:  General: frail appearing  HEENT: anicteric, tracheostomy, on vent  Cardio: S1, S2, normal rate  Resp: decreased breath sounds  Abd: Soft. Nondistended. PEG  Ext: no LE edema, small eschar to L heel  Skin: warm, dry    Laboratory Studies:  CBC:                       8.6    14.43 )-----------( 209      ( 21 Feb 2025 04:50 )             27.7     WBC Trend:  14.43 02-21-25 @ 04:50  11.40 02-20-25 @ 06:00  13.53 02-19-25 @ 18:00  14.38 02-19-25 @ 09:23  14.57 02-19-25 @ 06:45  18.58 02-18-25 @ 16:00  18.60 02-18-25 @ 08:34    CMP: 02-21    128[L]  |  86[L]  |  81[H]  ----------------------------<  259[H]  3.5   |  25  |  2.88[H]    Ca    9.5      21 Feb 2025 04:50  Phos  2.4     02-21  Mg     3.20     02-21    TPro  x   /  Alb  x   /  TBili  x   /  DBili  x   /  AST  x   /  ALT  12  /  AlkPhos  x   02-19      LIVER FUNCTIONS - ( 19 Feb 2025 19:05 )  Alb: x     / Pro: x     / ALK PHOS: x     / ALT: 12 U/L / AST: x     / GGT: x             Urinalysis Basic - ( 21 Feb 2025 04:50 )    Color: x / Appearance: x / SG: x / pH: x  Gluc: 259 mg/dL / Ketone: x  / Bili: x / Urobili: x   Blood: x / Protein: x / Nitrite: x   Leuk Esterase: x / RBC: x / WBC x   Sq Epi: x / Non Sq Epi: x / Bacteria: x      Microbiology: reviewed     Culture - INF Candida auris (collected 02-19-25 @ 18:46)  Source: Nares/Axilla/Groin Nares/Axilla/Groin  Preliminary Report (02-20-25 @ 21:09):    Culture in progress    Culture - Sputum (collected 02-19-25 @ 15:54)  Source: Trach Asp Tracheal Aspirate  Gram Stain (02-19-25 @ 22:37):    Numerous polymorphonuclear leukocytes per low power field    Rare Squamous epithelial cells per low power field    Numerous Gram Negative Rods seen per oil power field    Few Gram Positive Rods seen per oil power field    Culture - Urine (collected 02-18-25 @ 18:03)  Source: Clean Catch Clean Catch (Midstream)  Final Report (02-20-25 @ 15:06):    >100,000 CFU/ml Enterococcus faecium  Organism: Enterococcus faecium (02-20-25 @ 15:06)  Organism: Enterococcus faecium (02-20-25 @ 15:06)      Method Type: VIDA      -  Ampicillin: R >8 Predicts results to ampicillin/sulbactam, amoxacillin-clavulanate and  piperacillin-tazobactam.      -  Ciprofloxacin: R >2      -  Levofloxacin: R >4      -  Nitrofurantoin: S <=32 Should not be used to treat pyelonephritis.      -  Tetracycline: R >8      -  Vancomycin: S 0.5    Culture - Blood (collected 02-18-25 @ 16:05)  Source: .Blood Blood-Venous  Preliminary Report (02-20-25 @ 22:01):    No growth at 48 Hours    Culture - Blood (collected 02-18-25 @ 16:00)  Source: .Blood Blood-Peripheral  Preliminary Report (02-20-25 @ 22:01):    No growth at 48 Hours        Radiology: reviewed     Medications:  albuterol/ipratropium for Nebulization 3 milliLiter(s) Nebulizer every 6 hours  amLODIPine   Tablet 10 milliGRAM(s) Oral daily  atorvastatin 20 milliGRAM(s) Oral at bedtime  cefepime   IVPB 1000 milliGRAM(s) IV Intermittent every 24 hours  chlorhexidine 0.12% Liquid 15 milliLiter(s) Oral Mucosa every 12 hours  chlorhexidine 2% Cloths 1 Application(s) Topical daily  dextrose 5%. 1000 milliLiter(s) IV Continuous <Continuous>  dextrose 5%. 1000 milliLiter(s) IV Continuous <Continuous>  dextrose 50% Injectable 25 Gram(s) IV Push once  dextrose 50% Injectable 12.5 Gram(s) IV Push once  dextrose 50% Injectable 25 Gram(s) IV Push once  dextrose Oral Gel 15 Gram(s) Oral once PRN  epoetin reilly-epbx (RETACRIT) Injectable 38714 Unit(s) IV Push <User Schedule>  ferrous    sulfate Liquid 300 milliGRAM(s) Enteral Tube daily  glucagon  Injectable 1 milliGRAM(s) IntraMuscular once  hydrALAZINE 25 milliGRAM(s) Oral three times a day  insulin glargine Injectable (LANTUS) 28 Unit(s) SubCutaneous at bedtime  insulin lispro (ADMELOG) corrective regimen sliding scale   SubCutaneous every 6 hours  minocycline IVPB 200 milliGRAM(s) IV Intermittent every 12 hours  Nephro-noam 1 Tablet(s) Oral daily  pantoprazole   Suspension 40 milliGRAM(s) Oral before breakfast  sodium chloride 0.9% Bolus. 100 milliLiter(s) IV Bolus every 5 minutes PRN  sodium chloride 3%  Inhalation 4 milliLiter(s) Inhalation every 12 hours    Antimicrobials:  cefepime   IVPB 1000 milliGRAM(s) IV Intermittent every 24 hours  minocycline IVPB 200 milliGRAM(s) IV Intermittent every 12 hours

## 2025-02-21 NOTE — PROGRESS NOTE ADULT - ASSESSMENT
74-year-old male hx trach/vent - , CVA, COPD, stage renal disease on dialysis 4 times a week ([facility]) history of pressure ulcer.  Patient presents the ED today for elevated white count. nephrology consulted for dialysis needs    ESRD:  [facility]  On HD [facility] via an A-V fistula. s/p fistulogram by vascular 2/18  s/p hd 2/19 uf 2.3L  hd today. will keep MWF schedule while in hospital  consent from wife in dialysis unit  monitor electrolyte     htn  uncontrolled  restarted on norvasc 10 daily and hydralazine 25 tid  max uf as tolerated  up titrate hydralazine if sbp >150 persistently  monitor    Anemia:  - Transfuse for Hg < 7.  epo with hd  iron sat >20  monitor    leukocytosis  sepsis work up per team    hyponatremia  in setting of esrd and hyperglycemia  optimize dm control  hd per schedule  monitor

## 2025-02-21 NOTE — PROGRESS NOTE ADULT - NS ATTEND AMEND GEN_ALL_CORE FT
74-year-old man with a past medical history of CVA with residual hemiplegia COPD, ESRD on dialysis, chronic hypoxic hypercapnic respiratory failure tracheostomy and vent dependent, PEG in place, pressure ulcer was sent to the ED for leukocytosis.  Patient initially presented to the Cath Lab for fistulogram with vascular surgery.  He was found to have a white count and sent to the emergency room.  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 pseuodomonas and steno in past, cefepime, minocycline and now add Vancomycin.   - partially occlusive RUE DVT - acute. Discussed with vascular, will monitor fistula during HD today, and if no procedures planned by surgery consider full dose AC    -

## 2025-02-21 NOTE — CHART NOTE - NSCHARTNOTEFT_GEN_A_CORE
RCU PA NOTE     Called by RN with desaturation SPO2 79. Seen by bedside and tachypnea to 30s, tachycardic to 110, and SPO2 79 with good wave on FIO2 100. Currently on HD with 1.5L removed. PIP 20. Suctioned with minimal secretions. Auscultated with clear lungs BL. POCUS with LV hyperdynamic and IVC flat with concern for volume down. RV similar in size to LV, however likely second to LV being underfilled. Small pericardial effusion adjacent to RV, however no RV dysfunction appreciated as patient does not appear volume overloaded. Volume removal stopped and saturation improved. Discussed with HD and floor RN to monitor SPO2 for remaining of HD session. If desaturation continues concern noted for RUE DVT migration and possible PE. Case discussed with vascular and heparin GTT to be started this evening. Case discussed with MICU who will evaluate patient this evening.     JANET Mercedes, PA-C  Department of Medicine/ RCU  In house RCU [facility] [id number]  In house Medicine [facility] [id number]  Reachable via teams

## 2025-02-21 NOTE — CHART NOTE - NSCHARTNOTEFT_GEN_A_CORE
RCU PA POCUS NOTE     : ROBBIN Rhodes     INDICATION: Hypoxia    PROCEDURE:  [ x] LIMITED ECHO  [ x] LIMITED CHEST  [ ] LIMITED RETROPERITONEAL  [ ] LIMITED ABDOMINAL  [ ] LIMITED DVT  [ ] NEEDLE GUIDANCE VASCULAR  [ ] NEEDLE GUIDANCE THORACENTESIS  [ ] NEEDLE GUIDANCE PARACENTESIS  [ ] NEEDLE GUIDANCE PERICARDIOCENTESIS  [ ] OTHER    FINDINGS:  Bilateral lung sliding with A line predominance noted   LVSF appears hyperdynamic   IVC flat with complete collapse with inspiration   RV appears similar in size with LV   RV adjacent small pericardial effusion     INTERPRETATION:  LVSF hyperdynamic   IVC flat with concern for volume down   Small adjacent pericardial effusion adjacent to RV     JANET Mercedes, PA-C  Department of Medicine/ RCU  In house RCU [place] [id number]  In house Medicine [facility] [id number]  Reachable via teams RCU PA POCUS NOTE     : ROBBIN Rhodes     INDICATION: Hypoxia    PROCEDURE:  [ x] LIMITED ECHO  [ x] LIMITED CHEST  [ ] LIMITED RETROPERITONEAL  [ ] LIMITED ABDOMINAL  [ ] LIMITED DVT  [ ] NEEDLE GUIDANCE VASCULAR  [ ] NEEDLE GUIDANCE THORACENTESIS  [ ] NEEDLE GUIDANCE PARACENTESIS  [ ] NEEDLE GUIDANCE PERICARDIOCENTESIS  [ ] OTHER    FINDINGS:  Bilateral lung sliding with A line predominance noted and no BL PLEFs with possible atelectasis vs consolidation in BL LL.   LVSF appears hyperdynamic   RV appears similar in size with LV likely second to underfilled LV   IVC flat with complete collapse with inspiration   RV adjacent small pericardial effusion     INTERPRETATION:  LVSF hyperdynamic   IVC flat and LV appears underfilled with concern for volume down   Small adjacent pericardial effusion adjacent to RV     JANET Mercedes, PA-C  Department of Medicine/ RCU  In house RCU [place] [id number]  In house Medicine [facility] [id number]  Reachable via teams

## 2025-02-21 NOTE — CHART NOTE - NSCHARTNOTEFT_GEN_A_CORE
Vascular surgery was contacted for a duplex showing age-indeterminate, non-occlusive DVT in the R brachial vein.        74M PMHx HTN, IDDM2, CVA x2 ( with R. sided weakness and dysphagia s/p PEG tube), bedbound with multiple pressure ulcers, hx of RLE DVT (s/p apixaban course), recent admission for respiratory failure s/p tracheostomy (10/23/24), ESRD on HD ([facility]) now s/p RUE fistulogram with angioplasty (02/18/25, [facility]) found to have profound hyperglycemia, anemia, and leukocytosis admitted to RCU. Vascular surgery was reconsulted for duplex showing DVT in R brachial vein.     Recs:  - Continue to use AVF for HD  - would recommend AC if no medical contraindications   - Please call vascular surgery back if prolonged bleeding s/p cessation of HD catheter or issues with HD    ***RECOMMENDATIONS INCOMPLETE UNTIL DISCUSSED WITH VASCULAR FELLOW**  Vascular Surgery  [id number] Vascular surgery was contacted for a duplex showing age-indeterminate, non-occlusive DVT in the R brachial vein.        74M PMHx HTN, IDDM2, CVA x2 ( with R. sided weakness and dysphagia s/p PEG tube), bedbound with multiple pressure ulcers, hx of RLE DVT (s/p apixaban course), recent admission for respiratory failure s/p tracheostomy (10/23/24), ESRD on HD ([facility]) now s/p RUE fistulogram with angioplasty (02/18/25, [facility]) found to have profound hyperglycemia, anemia, and leukocytosis admitted to RCU. Vascular surgery was reconsulted for duplex showing DVT in R brachial vein.     Recs:  - Continue to use AVF for HD  - would recommend therapeutic AC for DVT- primary team to weigh risks/benefits of AC  - outpatient follow up with Dr. Lockett  - Please call vascular surgery back if prolonged bleeding s/p cessation of HD catheter or issues with HD    Vascular Surgery  [id number]

## 2025-02-21 NOTE — PROGRESS NOTE ADULT - SUBJECTIVE AND OBJECTIVE BOX
RCU PROGRESS NOTE     CHIEF COMPLAINT: Patient is a 74y old  Male who presents with a chief complaint of leukocytosis (20 Feb 2025 13:41)      INTERVAL EVENTS:    REVIEW OF SYSTEMS: Seen by bedside during AM rounds and     Mode: AC/ CMV (Assist Control/ Continuous Mandatory Ventilation), RR (machine): 15, TV (machine): 400, FiO2: 40, PEEP: 5, MAP: 8, PIP: 24      OBJECTIVE:  ICU Vital Signs Last 24 Hrs  T(C): 36.4 (21 Feb 2025 05:40), Max: 36.9 (20 Feb 2025 20:00)  T(F): 97.6 (21 Feb 2025 05:40), Max: 98.5 (20 Feb 2025 20:00)  HR: 71 (21 Feb 2025 07:44) (64 - 84)  BP: 153/66 (21 Feb 2025 05:40) (138/66 - 171/72)  BP(mean): 91 (20 Feb 2025 16:00) (91 - 100)  ABP: --  ABP(mean): --  RR: 16 (21 Feb 2025 05:40) (14 - 18)  SpO2: 99% (21 Feb 2025 07:44) (97% - 100%)    O2 Parameters below as of 21 Feb 2025 05:40  Patient On (Oxygen Delivery Method): ventilator    O2 Concentration (%): 40      Mode: AC/ CMV (Assist Control/ Continuous Mandatory Ventilation), RR (machine): 15, TV (machine): 400, FiO2: 40, PEEP: 5, MAP: 8, PIP: 24    02-20 @ 07:01  -  02-21 @ 07:00  --------------------------------------------------------  IN: 780 mL / OUT: 0 mL / NET: 780 mL      CAPILLARY BLOOD GLUCOSE      POCT Blood Glucose.: 291 mg/dL (21 Feb 2025 05:35)      HOSPITAL MEDICATIONS:  MEDICATIONS  (STANDING):  amLODIPine   Tablet 10 milliGRAM(s) Oral daily  atorvastatin 20 milliGRAM(s) Oral at bedtime  cefepime   IVPB 1000 milliGRAM(s) IV Intermittent every 24 hours  chlorhexidine 0.12% Liquid 15 milliLiter(s) Oral Mucosa every 12 hours  chlorhexidine 2% Cloths 1 Application(s) Topical daily  dextrose 5%. 1000 milliLiter(s) (100 mL/Hr) IV Continuous <Continuous>  dextrose 5%. 1000 milliLiter(s) (50 mL/Hr) IV Continuous <Continuous>  dextrose 50% Injectable 25 Gram(s) IV Push once  dextrose 50% Injectable 12.5 Gram(s) IV Push once  dextrose 50% Injectable 25 Gram(s) IV Push once  epoetin reilly-epbx (RETACRIT) Injectable 58510 Unit(s) IV Push <User Schedule>  ferrous    sulfate Liquid 300 milliGRAM(s) Enteral Tube daily  glucagon  Injectable 1 milliGRAM(s) IntraMuscular once  hydrALAZINE 25 milliGRAM(s) Oral three times a day  insulin glargine Injectable (LANTUS) 28 Unit(s) SubCutaneous at bedtime  insulin lispro (ADMELOG) corrective regimen sliding scale   SubCutaneous every 6 hours  minocycline IVPB 200 milliGRAM(s) IV Intermittent every 12 hours  Nephro-noam 1 Tablet(s) Oral daily  pantoprazole   Suspension 40 milliGRAM(s) Oral before breakfast  potassium phosphate / sodium phosphate Tablet (K-PHOS No. 2) 1 Tablet(s) Oral once  vancomycin  IVPB 1000 milliGRAM(s) IV Intermittent once    MEDICATIONS  (PRN):  dextrose Oral Gel 15 Gram(s) Oral once PRN Blood Glucose LESS THAN 70 milliGRAM(s)/deciliter  sodium chloride 0.9% Bolus. 100 milliLiter(s) IV Bolus every 5 minutes PRN SBP LESS THAN or EQUAL to 80 mmHg      PHYSICAL EXAMINATION    LABS:                        8.6    14.43 )-----------( 209      ( 21 Feb 2025 04:50 )             27.7     02-21    128[L]  |  86[L]  |  81[H]  ----------------------------<  259[H]  3.5   |  25  |  2.88[H]    Ca    9.5      21 Feb 2025 04:50  Phos  2.4     02-21  Mg     3.20     02-21    TPro  x   /  Alb  x   /  TBili  x   /  DBili  x   /  AST  x   /  ALT  12  /  AlkPhos  x   02-19      Urinalysis Basic - ( 21 Feb 2025 04:50 )    Color: x / Appearance: x / SG: x / pH: x  Gluc: 259 mg/dL / Ketone: x  / Bili: x / Urobili: x   Blood: x / Protein: x / Nitrite: x   Leuk Esterase: x / RBC: x / WBC x   Sq Epi: x / Non Sq Epi: x / Bacteria: x            PAST MEDICAL & SURGICAL HISTORY:  ESRD on hemodialysis      HTN (hypertension)      Insulin dependent type 2 diabetes mellitus      History of cerebrovascular accident (CVA) with residual deficit      History of DVT of lower extremity      HLD (hyperlipidemia)      CVA (cerebrovascular accident)      Aphasia      Tracheostomy in place      PEG (percutaneous endoscopic gastrostomy) status      GERD (gastroesophageal reflux disease)      Constipation      Pressure ulcer      Arteriovenous fistula      S/P percutaneous endoscopic gastrostomy (PEG) tube placement      History of tracheostomy          FAMILY HISTORY:  FHx: diabetes mellitus (Father, Aunt)        Social History:      RADIOLOGY:  [ ] Reviewed and interpreted by me    PULMONARY FUNCTION TESTS:    EKG: RCU PROGRESS NOTE     CHIEF COMPLAINT: Patient is a 74y old  Male who presents with a chief complaint of leukocytosis (20 Feb 2025 13:41)      INTERVAL EVENTS:    REVIEW OF SYSTEMS: Seen by bedside during AM rounds and     Mode: AC/ CMV (Assist Control/ Continuous Mandatory Ventilation), RR (machine): 15, TV (machine): 400, FiO2: 40, PEEP: 5, MAP: 8, PIP: 24      OBJECTIVE:  ICU Vital Signs Last 24 Hrs  T(C): 36.4 (21 Feb 2025 05:40), Max: 36.9 (20 Feb 2025 20:00)  T(F): 97.6 (21 Feb 2025 05:40), Max: 98.5 (20 Feb 2025 20:00)  HR: 71 (21 Feb 2025 07:44) (64 - 84)  BP: 153/66 (21 Feb 2025 05:40) (138/66 - 171/72)  BP(mean): 91 (20 Feb 2025 16:00) (91 - 100)  ABP: --  ABP(mean): --  RR: 16 (21 Feb 2025 05:40) (14 - 18)  SpO2: 99% (21 Feb 2025 07:44) (97% - 100%)    O2 Parameters below as of 21 Feb 2025 05:40  Patient On (Oxygen Delivery Method): ventilator    O2 Concentration (%): 40      Mode: AC/ CMV (Assist Control/ Continuous Mandatory Ventilation), RR (machine): 15, TV (machine): 400, FiO2: 40, PEEP: 5, MAP: 8, PIP: 24    02-20 @ 07:01  -  02-21 @ 07:00  --------------------------------------------------------  IN: 780 mL / OUT: 0 mL / NET: 780 mL      CAPILLARY BLOOD GLUCOSE  POCT Blood Glucose.: 291 mg/dL (21 Feb 2025 05:35)      HOSPITAL MEDICATIONS:  MEDICATIONS  (STANDING):  amLODIPine   Tablet 10 milliGRAM(s) Oral daily  atorvastatin 20 milliGRAM(s) Oral at bedtime  cefepime   IVPB 1000 milliGRAM(s) IV Intermittent every 24 hours  chlorhexidine 0.12% Liquid 15 milliLiter(s) Oral Mucosa every 12 hours  chlorhexidine 2% Cloths 1 Application(s) Topical daily  dextrose 5%. 1000 milliLiter(s) (100 mL/Hr) IV Continuous <Continuous>  dextrose 5%. 1000 milliLiter(s) (50 mL/Hr) IV Continuous <Continuous>  dextrose 50% Injectable 25 Gram(s) IV Push once  dextrose 50% Injectable 12.5 Gram(s) IV Push once  dextrose 50% Injectable 25 Gram(s) IV Push once  epoetin reilly-epbx (RETACRIT) Injectable 13170 Unit(s) IV Push <User Schedule>  ferrous    sulfate Liquid 300 milliGRAM(s) Enteral Tube daily  glucagon  Injectable 1 milliGRAM(s) IntraMuscular once  hydrALAZINE 25 milliGRAM(s) Oral three times a day  insulin glargine Injectable (LANTUS) 28 Unit(s) SubCutaneous at bedtime  insulin lispro (ADMELOG) corrective regimen sliding scale   SubCutaneous every 6 hours  minocycline IVPB 200 milliGRAM(s) IV Intermittent every 12 hours  Nephro-noam 1 Tablet(s) Oral daily  pantoprazole   Suspension 40 milliGRAM(s) Oral before breakfast  potassium phosphate / sodium phosphate Tablet (K-PHOS No. 2) 1 Tablet(s) Oral once  vancomycin  IVPB 1000 milliGRAM(s) IV Intermittent once    MEDICATIONS  (PRN):  dextrose Oral Gel 15 Gram(s) Oral once PRN Blood Glucose LESS THAN 70 milliGRAM(s)/deciliter  sodium chloride 0.9% Bolus. 100 milliLiter(s) IV Bolus every 5 minutes PRN SBP LESS THAN or EQUAL to 80 mmHg      PHYSICAL EXAMINATION  General: NAD and severely cachetic   HEENT: Trach present  Cards: S1/S2, no murmurs   Pulm: Course vent sounds bilaterally. No wheezes.   Abdomen: Soft, nondistended and nontender. PEG present.   Extremities: No pedal edema. THAI of BL upper and lower extremities.  Neurology: Awakens with eyes open, however does not follow commands with no acute focal neurological deficits     LABS:                        8.6    14.43 )-----------( 209      ( 21 Feb 2025 04:50 )             27.7     02-21    128[L]  |  86[L]  |  81[H]  ----------------------------<  259[H]  3.5   |  25  |  2.88[H]    Ca    9.5      21 Feb 2025 04:50  Phos  2.4     02-21  Mg     3.20     02-21    TPro  x   /  Alb  x   /  TBili  x   /  DBili  x   /  AST  x   /  ALT  12  /  AlkPhos  x   02-19      Urinalysis Basic - ( 21 Feb 2025 04:50 )    Color: x / Appearance: x / SG: x / pH: x  Gluc: 259 mg/dL / Ketone: x  / Bili: x / Urobili: x   Blood: x / Protein: x / Nitrite: x   Leuk Esterase: x / RBC: x / WBC x   Sq Epi: x / Non Sq Epi: x / Bacteria: x            PAST MEDICAL & SURGICAL HISTORY:  ESRD on hemodialysis      HTN (hypertension)      Insulin dependent type 2 diabetes mellitus      History of cerebrovascular accident (CVA) with residual deficit      History of DVT of lower extremity      HLD (hyperlipidemia)      CVA (cerebrovascular accident)      Aphasia      Tracheostomy in place      PEG (percutaneous endoscopic gastrostomy) status      GERD (gastroesophageal reflux disease)      Constipation      Pressure ulcer      Arteriovenous fistula      S/P percutaneous endoscopic gastrostomy (PEG) tube placement      History of tracheostomy          FAMILY HISTORY:  FHx: diabetes mellitus (Father, Aunt)        Social History:      RADIOLOGY:  [ ] Reviewed and interpreted by me    PULMONARY FUNCTION TESTS:    EKG: RCU PROGRESS NOTE     CHIEF COMPLAINT: Patient is a 74y old  Male who presents with a chief complaint of leukocytosis (20 Feb 2025 13:41)      INTERVAL EVENTS:  - No interval events overnight   - New RUE DVT and heparin GTT started   - Desaturation with HD second to volume issues?   - Hyponatremia from volume down?     REVIEW OF SYSTEMS: Seen by bedside during AM rounds and unable to assess ROS second to nonverbal/ vented     Mode: AC/ CMV (Assist Control/ Continuous Mandatory Ventilation), RR (machine): 15, TV (machine): 400, FiO2: 40, PEEP: 5, MAP: 8, PIP: 24      OBJECTIVE:  ICU Vital Signs Last 24 Hrs  T(C): 36.4 (21 Feb 2025 05:40), Max: 36.9 (20 Feb 2025 20:00)  T(F): 97.6 (21 Feb 2025 05:40), Max: 98.5 (20 Feb 2025 20:00)  HR: 71 (21 Feb 2025 07:44) (64 - 84)  BP: 153/66 (21 Feb 2025 05:40) (138/66 - 171/72)  BP(mean): 91 (20 Feb 2025 16:00) (91 - 100)  ABP: --  ABP(mean): --  RR: 16 (21 Feb 2025 05:40) (14 - 18)  SpO2: 99% (21 Feb 2025 07:44) (97% - 100%)    O2 Parameters below as of 21 Feb 2025 05:40  Patient On (Oxygen Delivery Method): ventilator    O2 Concentration (%): 40      Mode: AC/ CMV (Assist Control/ Continuous Mandatory Ventilation), RR (machine): 15, TV (machine): 400, FiO2: 40, PEEP: 5, MAP: 8, PIP: 24    02-20 @ 07:01  -  02-21 @ 07:00  --------------------------------------------------------  IN: 780 mL / OUT: 0 mL / NET: 780 mL      CAPILLARY BLOOD GLUCOSE  POCT Blood Glucose.: 291 mg/dL (21 Feb 2025 05:35)      HOSPITAL MEDICATIONS:  MEDICATIONS  (STANDING):  amLODIPine   Tablet 10 milliGRAM(s) Oral daily  atorvastatin 20 milliGRAM(s) Oral at bedtime  cefepime   IVPB 1000 milliGRAM(s) IV Intermittent every 24 hours  chlorhexidine 0.12% Liquid 15 milliLiter(s) Oral Mucosa every 12 hours  chlorhexidine 2% Cloths 1 Application(s) Topical daily  dextrose 5%. 1000 milliLiter(s) (100 mL/Hr) IV Continuous <Continuous>  dextrose 5%. 1000 milliLiter(s) (50 mL/Hr) IV Continuous <Continuous>  dextrose 50% Injectable 25 Gram(s) IV Push once  dextrose 50% Injectable 12.5 Gram(s) IV Push once  dextrose 50% Injectable 25 Gram(s) IV Push once  epoetin reilly-epbx (RETACRIT) Injectable 48366 Unit(s) IV Push <User Schedule>  ferrous    sulfate Liquid 300 milliGRAM(s) Enteral Tube daily  glucagon  Injectable 1 milliGRAM(s) IntraMuscular once  hydrALAZINE 25 milliGRAM(s) Oral three times a day  insulin glargine Injectable (LANTUS) 28 Unit(s) SubCutaneous at bedtime  insulin lispro (ADMELOG) corrective regimen sliding scale   SubCutaneous every 6 hours  minocycline IVPB 200 milliGRAM(s) IV Intermittent every 12 hours  Nephro-noam 1 Tablet(s) Oral daily  pantoprazole   Suspension 40 milliGRAM(s) Oral before breakfast  potassium phosphate / sodium phosphate Tablet (K-PHOS No. 2) 1 Tablet(s) Oral once  vancomycin  IVPB 1000 milliGRAM(s) IV Intermittent once    MEDICATIONS  (PRN):  dextrose Oral Gel 15 Gram(s) Oral once PRN Blood Glucose LESS THAN 70 milliGRAM(s)/deciliter  sodium chloride 0.9% Bolus. 100 milliLiter(s) IV Bolus every 5 minutes PRN SBP LESS THAN or EQUAL to 80 mmHg      PHYSICAL EXAMINATION  General: NAD and severely cachetic   HEENT: Trach present  Cards: S1/S2, no murmurs   Pulm: Course vent sounds bilaterally. No wheezes.   Abdomen: Soft, nondistended and nontender. PEG present.   Extremities: No pedal edema. THAI of BL upper and lower extremities.  Neurology: Awakens with eyes open, however does not follow commands with no acute focal neurological deficits     LABS:                        8.6    14.43 )-----------( 209      ( 21 Feb 2025 04:50 )             27.7     02-21    128[L]  |  86[L]  |  81[H]  ----------------------------<  259[H]  3.5   |  25  |  2.88[H]    Ca    9.5      21 Feb 2025 04:50  Phos  2.4     02-21  Mg     3.20     02-21    TPro  x   /  Alb  x   /  TBili  x   /  DBili  x   /  AST  x   /  ALT  12  /  AlkPhos  x   02-19      Urinalysis Basic - ( 21 Feb 2025 04:50 )    Color: x / Appearance: x / SG: x / pH: x  Gluc: 259 mg/dL / Ketone: x  / Bili: x / Urobili: x   Blood: x / Protein: x / Nitrite: x   Leuk Esterase: x / RBC: x / WBC x   Sq Epi: x / Non Sq Epi: x / Bacteria: x            PAST MEDICAL & SURGICAL HISTORY:  ESRD on hemodialysis      HTN (hypertension)      Insulin dependent type 2 diabetes mellitus      History of cerebrovascular accident (CVA) with residual deficit      History of DVT of lower extremity      HLD (hyperlipidemia)      CVA (cerebrovascular accident)      Aphasia      Tracheostomy in place      PEG (percutaneous endoscopic gastrostomy) status      GERD (gastroesophageal reflux disease)      Constipation      Pressure ulcer      Arteriovenous fistula      S/P percutaneous endoscopic gastrostomy (PEG) tube placement      History of tracheostomy          FAMILY HISTORY:  FHx: diabetes mellitus (Father, Aunt)        Social History:      RADIOLOGY:  [ ] Reviewed and interpreted by me    PULMONARY FUNCTION TESTS:    EKG:

## 2025-02-21 NOTE — PROGRESS NOTE ADULT - ASSESSMENT
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 Admitted to RCU for further management. ABX tailored to prior sputum with  PSA and steno, however UCx with enterococcus faecium.  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 Admitted to RCU for further management. Admitted to RCU for further management. ABX tailored to prior sputum with  PSA and steno, however UCx with enterococcus faecium.     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   - Prior TTE reviewed from 4/2024 with normal RVLVSF with no pericardial effusion noted at the time.   - BP currently stable   - Pending RPT ECHO   - Monitor hemodynamics     RESPIRATORY  # Respiratory failure  - Hx of COPD with chronic respiratory failure with tracheostomy and vent dependence  - Admitted for angioplasty of RUE AVF, however course complicated by leukocytosis with concern for recurrent trachitis/ PNA.   - Course complicated by hypoxia during HD likely volume down?   - Hold volume removal today   - Pending ECHO as above  - Continue on vent 15/400/5100 and wean FIO2 down as able.   - Hold PS for now  - Continue on nebs and HTS     GI  # Dysphagia with PEG   - Continue on PEG TF   - Monitor BMs    RENAL  # ESRD on HD MTTHF with AVF trouble   - Presented for RUE fistulogram and angioplasty with vascular on 2/18   - Resumed on HD with no flow issue from AVF   - Continue on HD MTTF per renal   - Monitor renal function, electrolytes and UOP     # Hyponatremia   - Concern for volume issues, however overall appears volume down   - Sodium 128 and will check urine lytes and serum osmo   - Trend sodium with HD for now     INFECTIOUS DISEASE  # Trachitis vs PNA/ UTI   - Hx of steno and PSA in sputum   - Flu/ COVID negative   - MRSA negative   - UCx with enterococcus   - SCx with GPR/ GNR   - Found with leukocytosis and continued on cefepime and christiano (2/19 - ) and vanco on HD days (2/21 - ) for now.   - ID following     # PPX   - Candida PCR pending   - MRSA PCR negative    HEME  # AOCD   - Presented for angioplasty and found with anemia to 6.7  - Anemia panel with concern for AOCD   - No overt signs of bleeding   - Continue on EPO and Fe   - Monitor      VASCULAR  # RUE DVT   - RUE edema noted with age-indeterminate, non-occlusive deep vein thrombosis noted in the right brachial vein.   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 Admitted to RCU for further management. Admitted to RCU for further management. ABX tailored to prior sputum with  PSA and steno, however UCx with enterococcus faecium.     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   - Prior TTE reviewed from 4/2024 with normal RVLVSF with no pericardial effusion noted at the time.   - BP currently stable   - Pending RPT ECHO   - Monitor hemodynamics     RESPIRATORY  # Respiratory failure  - Hx of COPD with chronic respiratory failure with tracheostomy and vent dependence  - Admitted for angioplasty of RUE AVF, however course complicated by leukocytosis with concern for recurrent trachitis/ PNA.   - Course complicated by hypoxia during HD likely volume down?   - Hold volume removal today   - Pending ECHO as above  - Continue on vent 15/400/5100 and wean FIO2 down as able.   - Hold PS for now  - Continue on nebs and HTS     GI  # Dysphagia with PEG   - Continue on PEG TF   - Monitor BMs    RENAL  # ESRD on HD MTTHF with AVF trouble   - Presented for RUE fistulogram and angioplasty with vascular on 2/18   - Resumed on HD with no flow issue from AVF   - Continue on HD MTTF per renal   - Monitor renal function, electrolytes and UOP     # Hyponatremia   - Concern for volume issues, however overall appears volume down   - Sodium 128 and will check urine lytes and serum osmo   - Trend sodium with HD for now     INFECTIOUS DISEASE  # Trachitis vs PNA/ UTI   - Hx of steno and PSA in sputum   - Flu/ COVID negative   - MRSA negative   - UCx with enterococcus   - SCx with GPR/ GNR   - Found with leukocytosis and continued on cefepime and christiano (2/19 - ) and vanco on HD days (2/21 - ) for now.   - ID following     # PPX   - Candida PCR pending   - MRSA PCR negative    HEME  # AOCD   - Presented for angioplasty and found with anemia to 6.7 s/p PRBC 2/19   - Anemia panel with concern for AOCD   - No overt signs of bleeding   - Continue on EPO and Fe   - Monitor      VASCULAR  # RUE DVT   - RUE edema noted with age-indeterminate, non-occlusive deep vein thrombosis noted in the right brachial vein.   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 Admitted to RCU for further management. Admitted to RCU for further management. ABX tailored to prior sputum with  PSA and steno, however UCx with enterococcus faecium.     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   - Prior TTE reviewed from 4/2024 with normal RVLVSF with no pericardial effusion noted at the time.   - BP currently stable   - Pending RPT ECHO   - Monitor hemodynamics     RESPIRATORY  # Respiratory failure  - Hx of COPD with chronic respiratory failure with tracheostomy and vent dependence  - Admitted for angioplasty of RUE AVF, however course complicated by leukocytosis with concern for recurrent trachitis/ PNA.   - Course complicated by hypoxia during HD likely volume down?   - Hold volume removal today   - Pending ECHO as above  - Continue on vent 15/400/5100 and wean FIO2 down as able.   - Hold PS for now  - Continue on nebs and HTS     GI  # Dysphagia with PEG   - Continue on PEG TF   - Monitor BMs    RENAL  # ESRD on HD MTTHF with AVF trouble   - Presented for RUE fistulogram and angioplasty with vascular on 2/18   - Resumed on HD with no flow issue from AVF   - Continue on HD MTTF per renal   - Monitor renal function, electrolytes and UOP     # Hyponatremia   - Concern for volume issues, however overall appears volume down   - Sodium 128 and will check urine lytes and serum osmo   - Trend sodium with HD for now     INFECTIOUS DISEASE  # Trachitis vs PNA/ UTI   - Hx of steno and PSA in sputum   - Flu/ COVID negative   - MRSA negative   - UCx with enterococcus   - SCx with GPR/ GNR   - Found with leukocytosis and continued on cefepime and christiano (2/19 - ) and vanco on HD days (2/21 - ) for now.   - ID following     # PPX   - Candida PCR pending   - MRSA PCR negative    HEME  # AOCD   - Presented for angioplasty and found with anemia to 6.7 s/p PRBC 2/19   - Anemia panel with concern for AOCD   - No overt signs of bleeding   - Continue on EPO and Fe   - Monitor      VASCULAR  # RUE DVT   - RUE edema noted with age-indeterminate, non-occlusive deep vein thrombosis noted in the right brachial vein.   - Case discussed with vascular who recommends full AC   - CTH from prior with encephalomalcia, however no hx of intracranial bleed   - Prior UGIB while on AC, however discharged on eliquis in 7/2024 and completed 6 month course for RLE DVT   - Start on heparin GTT with patient specific PTT 60-80   - Monitor      ENDOCRINE  # DM2 A1C 14.0 (1/2024) > 6.4 (2/2025)  - Presented with hyperglycemia and continued on lantus and ISS   - Increase lantus to 28U QHS and ISS   - Monitor FS    ETHICS/ GOC    - FULL CODE     DISPO - Back to [facility]

## 2025-02-22 LAB
ANION GAP SERPL CALC-SCNC: 16 MMOL/L — HIGH (ref 7–14)
APTT BLD: 30.8 SEC — SIGNIFICANT CHANGE UP (ref 24.5–35.6)
APTT BLD: 35.4 SEC — SIGNIFICANT CHANGE UP (ref 24.5–35.6)
BASOPHILS NFR BLD AUTO: 0.3 % — SIGNIFICANT CHANGE UP (ref 0–2)
BLOOD GAS VENOUS COMPREHENSIVE RESULT: SIGNIFICANT CHANGE UP
BLOOD GAS VENOUS COMPREHENSIVE RESULT: SIGNIFICANT CHANGE UP
BUN SERPL-MCNC: 40 MG/DL — HIGH (ref 7–23)
CALCIUM SERPL-MCNC: 9 MG/DL — SIGNIFICANT CHANGE UP (ref 8.4–10.5)
CHLORIDE SERPL-SCNC: 92 MMOL/L — LOW (ref 98–107)
CO2 SERPL-SCNC: 25 MMOL/L — SIGNIFICANT CHANGE UP (ref 22–31)
CREAT SERPL-MCNC: 1.65 MG/DL — HIGH (ref 0.5–1.3)
CULTURE RESULTS: ABNORMAL
EGFR: 44 ML/MIN/1.73M2 — LOW
EGFR: 44 ML/MIN/1.73M2 — LOW
EOSINOPHIL # BLD AUTO: 0.48 K/UL — SIGNIFICANT CHANGE UP (ref 0–0.5)
EOSINOPHIL NFR BLD AUTO: 2.4 % — SIGNIFICANT CHANGE UP (ref 0–6)
GLUCOSE BLDC GLUCOMTR-MCNC: 201 MG/DL — HIGH (ref 70–99)
GLUCOSE BLDC GLUCOMTR-MCNC: 230 MG/DL — HIGH (ref 70–99)
GLUCOSE BLDC GLUCOMTR-MCNC: 255 MG/DL — HIGH (ref 70–99)
HCT VFR BLD CALC: 26.3 % — LOW (ref 39–50)
HGB BLD-MCNC: 8.1 G/DL — LOW (ref 13–17)
IANC: 16.21 K/UL — HIGH (ref 1.8–7.4)
IMM GRANULOCYTES NFR BLD AUTO: 0.6 % — SIGNIFICANT CHANGE UP (ref 0–0.9)
INR BLD: 1.04 RATIO — SIGNIFICANT CHANGE UP (ref 0.85–1.16)
LYMPHOCYTES # BLD AUTO: 1.93 K/UL — SIGNIFICANT CHANGE UP (ref 1–3.3)
LYMPHOCYTES # BLD AUTO: 9.6 % — LOW (ref 13–44)
MAGNESIUM SERPL-MCNC: 2.8 MG/DL — HIGH (ref 1.6–2.6)
MCHC RBC-ENTMCNC: 23.7 PG — LOW (ref 27–34)
MCHC RBC-ENTMCNC: 30.8 G/DL — LOW (ref 32–36)
MCV RBC AUTO: 76.9 FL — LOW (ref 80–100)
MONOCYTES # BLD AUTO: 1.21 K/UL — HIGH (ref 0–0.9)
MONOCYTES NFR BLD AUTO: 6 % — SIGNIFICANT CHANGE UP (ref 2–14)
NEUTROPHILS # BLD AUTO: 16.21 K/UL — HIGH (ref 1.8–7.4)
NEUTROPHILS NFR BLD AUTO: 81.1 % — HIGH (ref 43–77)
NRBC # BLD AUTO: 0.24 K/UL — HIGH (ref 0–0)
NRBC # FLD: 0.24 K/UL — HIGH (ref 0–0)
NRBC BLD AUTO-RTO: 1 /100 WBCS — HIGH (ref 0–0)
OSMOLALITY SERPL: 307 MOSM/KG — HIGH (ref 275–295)
PHOSPHATE SERPL-MCNC: 1.6 MG/DL — LOW (ref 2.5–4.5)
PLATELET # BLD AUTO: 245 K/UL — SIGNIFICANT CHANGE UP (ref 150–400)
POTASSIUM SERPL-MCNC: 4.2 MMOL/L — SIGNIFICANT CHANGE UP (ref 3.5–5.3)
POTASSIUM SERPL-SCNC: 4.2 MMOL/L — SIGNIFICANT CHANGE UP (ref 3.5–5.3)
PROTHROM AB SERPL-ACNC: 12.4 SEC — SIGNIFICANT CHANGE UP (ref 9.9–13.4)
RBC # BLD: 3.42 M/UL — LOW (ref 4.2–5.8)
RBC # FLD: 19.5 % — HIGH (ref 10.3–14.5)
SODIUM SERPL-SCNC: 133 MMOL/L — LOW (ref 135–145)
WBC # BLD: 20.02 K/UL — HIGH (ref 3.8–10.5)
WBC # FLD AUTO: 20.02 K/UL — HIGH (ref 3.8–10.5)

## 2025-02-22 PROCEDURE — 99233 SBSQ HOSP IP/OBS HIGH 50: CPT

## 2025-02-22 RX ORDER — VANCOMYCIN HCL IN 5 % DEXTROSE 1.5G/250ML
500 PLASTIC BAG, INJECTION (ML) INTRAVENOUS ONCE
Refills: 0 | Status: COMPLETED | OUTPATIENT
Start: 2025-02-22 | End: 2025-02-22

## 2025-02-22 RX ORDER — HEPARIN SODIUM 1000 [USP'U]/ML
1200 INJECTION INTRAVENOUS; SUBCUTANEOUS
Qty: 25000 | Refills: 0 | Status: DISCONTINUED | OUTPATIENT
Start: 2025-02-22 | End: 2025-02-23

## 2025-02-22 RX ORDER — SODIUM PHOSPHATE,DIBASIC DIHYD
30 POWDER (GRAM) MISCELLANEOUS ONCE
Refills: 0 | Status: COMPLETED | OUTPATIENT
Start: 2025-02-22 | End: 2025-02-22

## 2025-02-22 RX ADMIN — IPRATROPIUM BROMIDE AND ALBUTEROL SULFATE 3 MILLILITER(S): .5; 2.5 SOLUTION RESPIRATORY (INHALATION) at 08:22

## 2025-02-22 RX ADMIN — Medication 250 MILLIGRAM(S): at 05:18

## 2025-02-22 RX ADMIN — Medication 100 MILLIGRAM(S): at 10:00

## 2025-02-22 RX ADMIN — Medication 25 MILLIGRAM(S): at 05:17

## 2025-02-22 RX ADMIN — Medication 40 MILLIGRAM(S): at 06:09

## 2025-02-22 RX ADMIN — Medication 1 APPLICATION(S): at 12:23

## 2025-02-22 RX ADMIN — IPRATROPIUM BROMIDE AND ALBUTEROL SULFATE 3 MILLILITER(S): .5; 2.5 SOLUTION RESPIRATORY (INHALATION) at 03:43

## 2025-02-22 RX ADMIN — Medication 15 MILLILITER(S): at 18:43

## 2025-02-22 RX ADMIN — IPRATROPIUM BROMIDE AND ALBUTEROL SULFATE 3 MILLILITER(S): .5; 2.5 SOLUTION RESPIRATORY (INHALATION) at 20:37

## 2025-02-22 RX ADMIN — ATORVASTATIN CALCIUM 20 MILLIGRAM(S): 80 TABLET, FILM COATED ORAL at 21:24

## 2025-02-22 RX ADMIN — IPRATROPIUM BROMIDE AND ALBUTEROL SULFATE 3 MILLILITER(S): .5; 2.5 SOLUTION RESPIRATORY (INHALATION) at 15:18

## 2025-02-22 RX ADMIN — Medication 12 UNIT(S): at 12:41

## 2025-02-22 RX ADMIN — INSULIN LISPRO 2: 100 INJECTION, SOLUTION INTRAVENOUS; SUBCUTANEOUS at 12:21

## 2025-02-22 RX ADMIN — Medication 25 MILLIGRAM(S): at 12:53

## 2025-02-22 RX ADMIN — Medication 25 MILLIGRAM(S): at 21:26

## 2025-02-22 RX ADMIN — Medication 15 MILLILITER(S): at 05:17

## 2025-02-22 RX ADMIN — Medication 85 MILLIMOLE(S): at 12:22

## 2025-02-22 RX ADMIN — HEPARIN SODIUM 10 UNIT(S)/HR: 1000 INJECTION INTRAVENOUS; SUBCUTANEOUS at 13:31

## 2025-02-22 RX ADMIN — AMLODIPINE BESYLATE 10 MILLIGRAM(S): 10 TABLET ORAL at 05:17

## 2025-02-22 RX ADMIN — Medication 300 MILLIGRAM(S): at 12:27

## 2025-02-22 RX ADMIN — Medication 1 TABLET(S): at 12:27

## 2025-02-22 RX ADMIN — CEFEPIME 33.33 MILLIGRAM(S): 2 INJECTION, POWDER, FOR SOLUTION INTRAVENOUS at 21:17

## 2025-02-22 RX ADMIN — Medication 12 UNIT(S): at 17:53

## 2025-02-22 RX ADMIN — HEPARIN SODIUM 12 UNIT(S)/HR: 1000 INJECTION INTRAVENOUS; SUBCUTANEOUS at 20:29

## 2025-02-22 RX ADMIN — HEPARIN SODIUM 7.5 UNIT(S)/HR: 1000 INJECTION INTRAVENOUS; SUBCUTANEOUS at 08:38

## 2025-02-22 RX ADMIN — INSULIN LISPRO 2: 100 INJECTION, SOLUTION INTRAVENOUS; SUBCUTANEOUS at 17:52

## 2025-02-22 RX ADMIN — INSULIN LISPRO 3: 100 INJECTION, SOLUTION INTRAVENOUS; SUBCUTANEOUS at 05:33

## 2025-02-22 NOTE — PROGRESS NOTE ADULT - ASSESSMENT
74-year-old male hx trach/vent - , CVA, COPD, stage renal disease on dialysis 4 times a week ([facility]) history of pressure ulcer.  Patient presents the ED today for elevated white count. nephrology consulted for dialysis needs    ESRD:  [facility]  On HD MTTsF via an A-V fistula. s/p fistulogram by vascular 2/18  Continue MWF  consent from wife in dialysis unit  monitor electrolyte     htn  controlled  continue  norvasc 10 daily and hydralazine 25 tid  max uf as tolerated  up titrate hydralazine if sbp >150 persistently  monitor    Anemia:  - Transfuse for Hg < 7.  epo with hd  iron sat >20  monitor    leukocytosis  sepsis work up per team    hyponatremia  in setting of esrd and hyperglycemia  optimize dm control  hd per schedule  monitor

## 2025-02-22 NOTE — PROGRESS NOTE ADULT - SUBJECTIVE AND OBJECTIVE BOX
RCU PROGRESS NOTE     CHIEF COMPLAINT: Patient is a 74y old  Male who presents with a chief complaint of leukocytosis (20 Feb 2025 13:41)      INTERVAL EVENTS:  - No interval events overnight   - New RUE DVT and heparin GTT started   - Desaturation with HD second to volume issues, held volume removal overnight, and saturation and tachycardia improved.     REVIEW OF SYSTEMS: Seen by bedside during AM rounds and unable to assess ROS second to nonverbal/ vented     Mode: AC/ CMV (Assist Control/ Continuous Mandatory Ventilation), RR (machine): 15, TV (machine): 400, FiO2: 40, PEEP: 5, MAP: 8, PIP: 24      OBJECTIVE:  ICU Vital Signs Last 24 Hrs  T(C): 36.4 (21 Feb 2025 05:40), Max: 36.9 (20 Feb 2025 20:00)  T(F): 97.6 (21 Feb 2025 05:40), Max: 98.5 (20 Feb 2025 20:00)  HR: 71 (21 Feb 2025 07:44) (64 - 84)  BP: 153/66 (21 Feb 2025 05:40) (138/66 - 171/72)  BP(mean): 91 (20 Feb 2025 16:00) (91 - 100)  ABP: --  ABP(mean): --  RR: 16 (21 Feb 2025 05:40) (14 - 18)  SpO2: 99% (21 Feb 2025 07:44) (97% - 100%)    O2 Parameters below as of 21 Feb 2025 05:40  Patient On (Oxygen Delivery Method): ventilator    O2 Concentration (%): 40      Mode: AC/ CMV (Assist Control/ Continuous Mandatory Ventilation), RR (machine): 15, TV (machine): 400, FiO2: 40, PEEP: 5, MAP: 8, PIP: 24    02-20 @ 07:01  -  02-21 @ 07:00  --------------------------------------------------------  IN: 780 mL / OUT: 0 mL / NET: 780 mL      CAPILLARY BLOOD GLUCOSE  POCT Blood Glucose.: 291 mg/dL (21 Feb 2025 05:35)      HOSPITAL MEDICATIONS:  MEDICATIONS  (STANDING):  amLODIPine   Tablet 10 milliGRAM(s) Oral daily  atorvastatin 20 milliGRAM(s) Oral at bedtime  cefepime   IVPB 1000 milliGRAM(s) IV Intermittent every 24 hours  chlorhexidine 0.12% Liquid 15 milliLiter(s) Oral Mucosa every 12 hours  chlorhexidine 2% Cloths 1 Application(s) Topical daily  dextrose 5%. 1000 milliLiter(s) (100 mL/Hr) IV Continuous <Continuous>  dextrose 5%. 1000 milliLiter(s) (50 mL/Hr) IV Continuous <Continuous>  dextrose 50% Injectable 25 Gram(s) IV Push once  dextrose 50% Injectable 12.5 Gram(s) IV Push once  dextrose 50% Injectable 25 Gram(s) IV Push once  epoetin reilly-epbx (RETACRIT) Injectable 11703 Unit(s) IV Push <User Schedule>  ferrous    sulfate Liquid 300 milliGRAM(s) Enteral Tube daily  glucagon  Injectable 1 milliGRAM(s) IntraMuscular once  hydrALAZINE 25 milliGRAM(s) Oral three times a day  insulin glargine Injectable (LANTUS) 28 Unit(s) SubCutaneous at bedtime  insulin lispro (ADMELOG) corrective regimen sliding scale   SubCutaneous every 6 hours  minocycline IVPB 200 milliGRAM(s) IV Intermittent every 12 hours  Nephro-noam 1 Tablet(s) Oral daily  pantoprazole   Suspension 40 milliGRAM(s) Oral before breakfast  potassium phosphate / sodium phosphate Tablet (K-PHOS No. 2) 1 Tablet(s) Oral once  vancomycin  IVPB 1000 milliGRAM(s) IV Intermittent once    MEDICATIONS  (PRN):  dextrose Oral Gel 15 Gram(s) Oral once PRN Blood Glucose LESS THAN 70 milliGRAM(s)/deciliter  sodium chloride 0.9% Bolus. 100 milliLiter(s) IV Bolus every 5 minutes PRN SBP LESS THAN or EQUAL to 80 mmHg      PHYSICAL EXAMINATION  General: NAD and severely cachetic   HEENT: Trach present  Cards: S1/S2, no murmurs   Pulm: Course vent sounds bilaterally. No wheezes.   Abdomen: Soft, nondistended and nontender. PEG present.   Extremities: No pedal edema. RUE edema noted. RUE forearm AVF with strong thrill. THAI of BL upper and lower extremities.  Neurology: Awakens with eyes open, however does not follow commands with no acute focal neurological deficits     LABS:                        8.6    14.43 )-----------( 209      ( 21 Feb 2025 04:50 )             27.7     02-21    128[L]  |  86[L]  |  81[H]  ----------------------------<  259[H]  3.5   |  25  |  2.88[H]    Ca    9.5      21 Feb 2025 04:50  Phos  2.4     02-21  Mg     3.20     02-21    TPro  x   /  Alb  x   /  TBili  x   /  DBili  x   /  AST  x   /  ALT  12  /  AlkPhos  x   02-19 02-22    133[L]  |  92[L]  |  40[H]  ----------------------------<  227[H]  4.2   |  25  |  1.65[H]    Ca    9.0      22 Feb 2025 05:30  Phos  1.6     02-22  Mg     2.80     02-22          Urinalysis Basic - ( 21 Feb 2025 04:50 )  Color: x / Appearance: x / SG: x / pH: x  Gluc: 259 mg/dL / Ketone: x  / Bili: x / Urobili: x   Blood: x / Protein: x / Nitrite: x   Leuk Esterase: x / RBC: x / WBC x   Sq Epi: x / Non Sq Epi: x / Bacteria: x            PAST MEDICAL & SURGICAL HISTORY:  ESRD on hemodialysis      HTN (hypertension)      Insulin dependent type 2 diabetes mellitus      History of cerebrovascular accident (CVA) with residual deficit      History of DVT of lower extremity      HLD (hyperlipidemia)      CVA (cerebrovascular accident)      Aphasia      Tracheostomy in place      PEG (percutaneous endoscopic gastrostomy) status      GERD (gastroesophageal reflux disease)      Constipation      Pressure ulcer      Arteriovenous fistula      S/P percutaneous endoscopic gastrostomy (PEG) tube placement      History of tracheostomy          FAMILY HISTORY:  FHx: diabetes mellitus (Father, Aunt)        Social History:      RADIOLOGY:  [ ] Reviewed and interpreted by me    PULMONARY FUNCTION TESTS:    EKG: RCU PROGRESS NOTE     CHIEF COMPLAINT: Patient is a 74y old  Male who presents with a chief complaint of leukocytosis (20 Feb 2025 13:41)      INTERVAL EVENTS:  - No interval events overnight   - New RUE DVT and possible PE. Heparin GTT started and pending CTA CHEST. Per discussion with wife if PE positive then she wishes to continue on AC despite risk of recurrent GIB, however if no PE she wishes to hold on AC.     REVIEW OF SYSTEMS: Seen by bedside during AM rounds and unable to assess ROS second to vented     Mode: AC/ CMV (Assist Control/ Continuous Mandatory Ventilation), RR (machine): 15, TV (machine): 400, FiO2: 40, PEEP: 5, MAP: 8, PIP: 24      OBJECTIVE:  ICU Vital Signs Last 24 Hrs  T(C): 36.4 (21 Feb 2025 05:40), Max: 36.9 (20 Feb 2025 20:00)  T(F): 97.6 (21 Feb 2025 05:40), Max: 98.5 (20 Feb 2025 20:00)  HR: 71 (21 Feb 2025 07:44) (64 - 84)  BP: 153/66 (21 Feb 2025 05:40) (138/66 - 171/72)  BP(mean): 91 (20 Feb 2025 16:00) (91 - 100)  ABP: --  ABP(mean): --  RR: 16 (21 Feb 2025 05:40) (14 - 18)  SpO2: 99% (21 Feb 2025 07:44) (97% - 100%)    O2 Parameters below as of 21 Feb 2025 05:40  Patient On (Oxygen Delivery Method): ventilator    O2 Concentration (%): 40      Mode: AC/ CMV (Assist Control/ Continuous Mandatory Ventilation), RR (machine): 15, TV (machine): 400, FiO2: 40, PEEP: 5, MAP: 8, PIP: 24    02-20 @ 07:01  -  02-21 @ 07:00  --------------------------------------------------------  IN: 780 mL / OUT: 0 mL / NET: 780 mL      CAPILLARY BLOOD GLUCOSE  POCT Blood Glucose.: 291 mg/dL (21 Feb 2025 05:35)      HOSPITAL MEDICATIONS:  MEDICATIONS  (STANDING):  amLODIPine   Tablet 10 milliGRAM(s) Oral daily  atorvastatin 20 milliGRAM(s) Oral at bedtime  cefepime   IVPB 1000 milliGRAM(s) IV Intermittent every 24 hours  chlorhexidine 0.12% Liquid 15 milliLiter(s) Oral Mucosa every 12 hours  chlorhexidine 2% Cloths 1 Application(s) Topical daily  dextrose 5%. 1000 milliLiter(s) (100 mL/Hr) IV Continuous <Continuous>  dextrose 5%. 1000 milliLiter(s) (50 mL/Hr) IV Continuous <Continuous>  dextrose 50% Injectable 25 Gram(s) IV Push once  dextrose 50% Injectable 12.5 Gram(s) IV Push once  dextrose 50% Injectable 25 Gram(s) IV Push once  epoetin reilly-epbx (RETACRIT) Injectable 75692 Unit(s) IV Push <User Schedule>  ferrous    sulfate Liquid 300 milliGRAM(s) Enteral Tube daily  glucagon  Injectable 1 milliGRAM(s) IntraMuscular once  hydrALAZINE 25 milliGRAM(s) Oral three times a day  insulin glargine Injectable (LANTUS) 28 Unit(s) SubCutaneous at bedtime  insulin lispro (ADMELOG) corrective regimen sliding scale   SubCutaneous every 6 hours  minocycline IVPB 200 milliGRAM(s) IV Intermittent every 12 hours  Nephro-noam 1 Tablet(s) Oral daily  pantoprazole   Suspension 40 milliGRAM(s) Oral before breakfast  potassium phosphate / sodium phosphate Tablet (K-PHOS No. 2) 1 Tablet(s) Oral once  vancomycin  IVPB 1000 milliGRAM(s) IV Intermittent once    MEDICATIONS  (PRN):  dextrose Oral Gel 15 Gram(s) Oral once PRN Blood Glucose LESS THAN 70 milliGRAM(s)/deciliter  sodium chloride 0.9% Bolus. 100 milliLiter(s) IV Bolus every 5 minutes PRN SBP LESS THAN or EQUAL to 80 mmHg      PHYSICAL EXAMINATION  General: NAD and severely cachetic   HEENT: Trach present  Cards: S1/S2, no murmurs   Pulm: Course vent sounds bilaterally. No wheezes.   Abdomen: Soft, nondistended and nontender. PEG present.   Extremities: No pedal edema. RUE edema noted. RUE forearm AVF with strong thrill. THAI of BL upper and lower extremities.  Neurology: Awakens with eyes open, able to follow commands on left (LUE>LLE), however noted with R hemiplegia per baseline. Overall severe weakness noted. No acute focal neurological deficits     LABS:                                             8.1    20.02 )-----------( 245      ( 22 Feb 2025 10:07 )             26.3       02-22    133[L]  |  92[L]  |  40[H]  ----------------------------<  227[H]  4.2   |  25  |  1.65[H]    Ca    9.0      22 Feb 2025 05:30  Phos  1.6     02-22  Mg     2.80     02-22            Urinalysis Basic - ( 21 Feb 2025 04:50 )  Color: x / Appearance: x / SG: x / pH: x  Gluc: 259 mg/dL / Ketone: x  / Bili: x / Urobili: x   Blood: x / Protein: x / Nitrite: x   Leuk Esterase: x / RBC: x / WBC x   Sq Epi: x / Non Sq Epi: x / Bacteria: x            PAST MEDICAL & SURGICAL HISTORY:  ESRD on hemodialysis      HTN (hypertension)      Insulin dependent type 2 diabetes mellitus      History of cerebrovascular accident (CVA) with residual deficit      History of DVT of lower extremity      HLD (hyperlipidemia)      CVA (cerebrovascular accident)      Aphasia      Tracheostomy in place      PEG (percutaneous endoscopic gastrostomy) status      GERD (gastroesophageal reflux disease)      Constipation      Pressure ulcer      Arteriovenous fistula      S/P percutaneous endoscopic gastrostomy (PEG) tube placement      History of tracheostomy          FAMILY HISTORY:  FHx: diabetes mellitus (Father, Aunt)        Social History:      RADIOLOGY:  [ ] Reviewed and interpreted by me    PULMONARY FUNCTION TESTS:    EKG:

## 2025-02-22 NOTE — PROGRESS NOTE ADULT - SUBJECTIVE AND OBJECTIVE BOX
[facility]  Kenton Fox MD PhD, Bell Wheatley MD, Saray Toribio MD, Delano Ross MD, Steven Torres MD  and providing coverage with Radha Duvall MD  Providing Infectious Disease Consultations at [facility], [facility], [facility]'s    Office# [phone] to schedule follow up appointments  Answering Service for urgent calls or New Consults [phone]  Cell# to text for urgent issues Kenton Fox [phone]     infectious diseases consult note:    JIMMY BLAND is a 74y y. o. Male patient     HPI:  73 yo M with hx trach/vent (last changed 10/23/24), CVA x2 with residual R hemiplegia, COPD, ESRD on HD MWF (last 2/17), pressure ulcer presenting to [facility] ED for leukocytosis. Patient was intially up in the cath lab earlier today with vascular surgery for fistulogram and noted to have a WBC of 18 and sent to ED. Patient nonverbal at baseline, history provided by son. Per son, patient at normal baseline, somewhat able to make needs known and is not complaining of any pain.      ED course:Initial vitals T 37.1, HR 74, /68, SpO2 100%. Labs notable for WBC 18, RBC 7.3 (baseline 7.2), , Na 132, K 3.2, bicarb 30, Cr 3.08 (baseline ~3), Alk phos 260, normal LFTs, VBG with pH 7.34, pCO2 65, UA with large leuk esterase, moderate blood, > 6k WBCs, and many bacteria, RVP negative.     Patient to be admitted to [facility]CU as he is trach/vented in setting of leukocytosis 2/2 likely UTI with plan for further evaluation/management.   (18 Feb 2025 18:00)      PAST MEDICAL & SURGICAL HISTORY:  ESRD on hemodialysis      HTN (hypertension)      Insulin dependent type 2 diabetes mellitus      History of cerebrovascular accident (CVA) with residual deficit      History of DVT of lower extremity      HLD (hyperlipidemia)      CVA (cerebrovascular accident)      Aphasia      Tracheostomy in place      PEG (percutaneous endoscopic gastrostomy) status      GERD (gastroesophageal reflux disease)      Constipation      Pressure ulcer      Arteriovenous fistula      S/P percutaneous endoscopic gastrostomy (PEG) tube placement      History of tracheostomy          Allergies    No Known Allergies    Intolerances        ANTIBIOTICS/RELEVANT:  antimicrobials  cefepime   IVPB 1000 milliGRAM(s) IV Intermittent every 24 hours    immunologic:  epoetin reilly-epbx (RETACRIT) Injectable 23330 Unit(s) IV Push <User Schedule>    OTHER:  albuterol/ipratropium for Nebulization 3 milliLiter(s) Nebulizer every 6 hours  amLODIPine   Tablet 10 milliGRAM(s) Oral daily  atorvastatin 20 milliGRAM(s) Oral at bedtime  chlorhexidine 0.12% Liquid 15 milliLiter(s) Oral Mucosa every 12 hours  chlorhexidine 2% Cloths 1 Application(s) Topical daily  dextrose 5%. 1000 milliLiter(s) IV Continuous <Continuous>  dextrose 5%. 1000 milliLiter(s) IV Continuous <Continuous>  dextrose 50% Injectable 25 Gram(s) IV Push once  dextrose 50% Injectable 12.5 Gram(s) IV Push once  dextrose 50% Injectable 25 Gram(s) IV Push once  dextrose Oral Gel 15 Gram(s) Oral once PRN  ferrous    sulfate Liquid 300 milliGRAM(s) Enteral Tube daily  glucagon  Injectable 1 milliGRAM(s) IntraMuscular once  heparin  Infusion 750 Unit(s)/Hr IV Continuous <Continuous>  hydrALAZINE 25 milliGRAM(s) Oral three times a day  insulin lispro (ADMELOG) corrective regimen sliding scale   SubCutaneous every 6 hours  insulin NPH human recombinant 12 Unit(s) SubCutaneous <User Schedule>  Nephro-noam 1 Tablet(s) Oral daily  pantoprazole   Suspension 40 milliGRAM(s) Oral before breakfast  sodium chloride 0.9% Bolus. 100 milliLiter(s) IV Bolus every 5 minutes PRN      Social History: no toxic habits reported  Social History:        Family History: noncontrib  FAMILY HISTORY:  FHx: diabetes mellitus (Father, Aunt)          ROS:    EYES:  Negative  blurry vision or double vision  GASTROINTESTINAL:  Negative for nausea, vomiting, diarrhea  -otherwise negative except for subjective    Objective:  Last 24-Vital Signs Last 24 Hrs  T(C): 36.7 (22 Feb 2025 04:00), Max: 37 (21 Feb 2025 20:36)  T(F): 98.1 (22 Feb 2025 04:00), Max: 98.6 (21 Feb 2025 20:36)  HR: 71 (22 Feb 2025 15:20) (70 - 108)  BP: 126/62 (22 Feb 2025 04:00) (126/62 - 168/80)  BP(mean): --  RR: 16 (22 Feb 2025 04:00) (16 - 30)  SpO2: 100% (22 Feb 2025 15:20) (94% - 100%)    Parameters below as of 22 Feb 2025 15:19  Patient On (Oxygen Delivery Method): ventilator        T(C): 36.7 (02-22-25 @ 04:00), Max: 37 (02-21-25 @ 20:36)  T(F): 98.1 (02-22-25 @ 04:00), Max: 98.6 (02-21-25 @ 20:36)  T(C): 36.7 (02-22-25 @ 04:00), Max: 37 (02-21-25 @ 20:36)  T(F): 98.1 (02-22-25 @ 04:00), Max: 98.6 (02-21-25 @ 20:36)  T(C): 36.7 (02-22-25 @ 04:00), Max: 37.9 (02-19-25 @ 10:00)  T(F): 98.1 (02-22-25 @ 04:00), Max: 100.3 (02-19-25 @ 10:00)    PHYSICAL EXAM:  Constitutional: NAD  Eyes: PERRLA, EOMI  Ear/Nose/Throat: oropharynx normal	  Neck: no JVD, no lymphadenopathy, supple, intubated via trach  Respiratory: no accessory muscle use, breathing w vent  Cardiovascular: distant  Gastrointestinal: soft, NT, no HSM, BS-normal  Extremities: no clubbing, no cyanosis, edema absent  Neuro: patient alert, oriented and appropriate  Skin: no sig lesions    Mode: AC/ CMV (Assist Control/ Continuous Mandatory Ventilation), RR (machine): 15, TV (machine): 400, FiO2: 40, PEEP: 5, ITime: 0.8, MAP: 10, PIP: 30    LABS:                        8.1    20.02 )-----------( 245      ( 22 Feb 2025 10:07 )             26.3       WBC 20.02  02-22 @ 10:07  WBC 14.43  02-21 @ 04:50  WBC 11.40  02-20 @ 06:00  WBC 13.53  02-19 @ 18:00  WBC 14.38  02-19 @ 09:23  WBC 14.57  02-19 @ 06:45  WBC 18.58  02-18 @ 16:00  WBC 18.60  02-18 @ 08:34      02-22    133[L]  |  92[L]  |  40[H]  ----------------------------<  227[H]  4.2   |  25  |  1.65[H]    Ca    9.0      22 Feb 2025 05:30  Phos  1.6     02-22  Mg     2.80     02-22        Creatinine: 1.65 mg/dL (02-22-25 @ 05:30)  Creatinine: 1.39 mg/dL (02-21-25 @ 21:21)  Creatinine: 2.88 mg/dL (02-21-25 @ 04:50)  Creatinine: 2.16 mg/dL (02-20-25 @ 06:00)  Creatinine: 3.62 mg/dL (02-19-25 @ 06:45)  Creatinine: 3.08 mg/dL (02-18-25 @ 16:00)  Creatinine: 2.79 mg/dL (02-18-25 @ 08:34)      PT/INR - ( 22 Feb 2025 10:07 )   PT: 12.4 sec;   INR: 1.04 ratio         PTT - ( 22 Feb 2025 10:07 )  PTT:35.4 sec  Urinalysis Basic - ( 22 Feb 2025 05:30 )    Color: x / Appearance: x / SG: x / pH: x  Gluc: 227 mg/dL / Ketone: x  / Bili: x / Urobili: x   Blood: x / Protein: x / Nitrite: x   Leuk Esterase: x / RBC: x / WBC x   Sq Epi: x / Non Sq Epi: x / Bacteria: x            MICROBIOLOGY:    Culture - Urine (02.20.25 @ 18:00)    Specimen Source: Clean Catch Clean Catch (Midstream)   Culture Results:   >100,000 CFU/ml Enterococcus faecium    Culture - Sputum . (02.19.25 @ 15:54)    Gram Stain:   Numerous polymorphonuclear leukocytes per low power field  Rare Squamous epithelial cells per low power field  Numerous Gram Negative Rods seen per oil power field  Few Gram Positive Rods seen per oil power field   -  Ceftazidime: I 16   -  Cefepime: S 8   -  Ciprofloxacin: S <=0.25   -  Aztreonam: R >16   -  Imipenem: R >8   -  Levofloxacin: S <=0.5   -  Meropenem: R >8   -  Piperacillin/Tazobactam: R 64   -  Ceftolozane/tazobactam: S <=2   -  Ceftazidime/Avibactam: S <=4   -  Resistance Gene to Carbapenem: Nondet   Specimen Source: Trach Asp Tracheal Aspirate   Culture Results:   Few Pseudomonas aeruginosa (Carbapenem Resistant)  Commensal dominick consistent with body site   Organism Identification: Pseudomonas aeruginosa (Carbapenem Resistant)   Organism: Pseudomonas aeruginosa (Carbapenem Resistant)   Organism: Pseudomonas aeruginosa (Carbapenem Resistant)   Method Type: Walt   Method Type: VIDA    Culture - Sputum . (11.02.24 @ 06:50)    -  Minocycline: I   -  Meropenem: S <=1   -  Piperacillin/Tazobactam: S 16   -  Levofloxacin: R >4   -  Trimethoprim/Sulfamethoxazole: R >2/38   Gram Stain:   Rare polymorphonuclear leukocytes per low power field  Few Squamous epithelial cells per low power field  Few Gram Negative Rods per oil power field  Rare Gram positive cocci in pairs per oil power field  Rare Yeast like cells per oil power field   -  Ceftazidime: R >16   -  Cefepime: S 8   -  Ciprofloxacin: R 2   -  Levofloxacin: R >4   -  Aztreonam: S 8   -  Imipenem: S <=1   Specimen Source: Trach Asp Tracheal Aspirate   Culture Results:   Moderate Pseudomonas aeruginosa  Moderate Stenotrophomonas maltophilia  Commensal dominick consistent with body site   Organism Identification: Pseudomonas aeruginosa  Stenotrophomonas maltophilia   Organism: Pseudomonas aeruginosa   Organism: Stenotrophomonas maltophilia   Organism: Stenotrophomonas maltophilia   Method Type: VIDA   Method Type: KB   Method Type: VIDA        RADIOLOGY & ADDITIONAL STUDIES:

## 2025-02-22 NOTE — CHART NOTE - NSCHARTNOTEFT_GEN_A_CORE
[facility] PA POCUS NOTE     : ROBBIN Rhodes     INDICATION: Reassess volume status    PROCEDURE:  [ x] LIMITED ECHO  [ x] LIMITED CHEST  [ ] LIMITED RETROPERITONEAL  [ ] LIMITED ABDOMINAL  [ ] LIMITED DVT  [ ] NEEDLE GUIDANCE VASCULAR  [ ] NEEDLE GUIDANCE THORACENTESIS  [ ] NEEDLE GUIDANCE PARACENTESIS  [ ] NEEDLE GUIDANCE PERICARDIOCENTESIS  [ ] OTHER    FINDINGS:  BL lung sliding with A line predominance   Trace R PLEF with consolidated vs atelectasis right lower lobe   LVSF appears normal   RV small than LV   IVC measures 1.6cm without complete collapse   Trace pericardial effusion present     INTERPRETATION:  Volume status improved in comparison to yesterday's exam     JANET Mercedes, PA-C  Department of Medicine/ [facility]  In house [facility] [id number]  In house Medicine [facility] [id number]  Reachable via teams

## 2025-02-22 NOTE — PROGRESS NOTE ADULT - SUBJECTIVE AND OBJECTIVE BOX
[facility]   MD ELIANE BHAGAT MD RUORU WONG, PA    TEL:  FROM 9 AM to 5 PM ---OFFICE: [phone]  AVAILABLE ON TEAMS    FROM 5 PM - 9 AM PLEASE CALL ANSWERING SERVICE: [phone]    [facility] FOLLOW UP NOTE--Date of Service 02-22-25 @ 08:19  --------------------------------------------------------------------------------  HPI:      Pt seen and examined at bedside.        PAST HISTORY  --------------------------------------------------------------------------------  No significant changes to PMH, PSH, FHx, SHx, unless otherwise noted    ALLERGIES & MEDICATIONS  --------------------------------------------------------------------------------  Allergies    No Known Allergies    Intolerances      Standing Inpatient Medications  albuterol/ipratropium for Nebulization 3 milliLiter(s) Nebulizer every 6 hours  amLODIPine   Tablet 10 milliGRAM(s) Oral daily  atorvastatin 20 milliGRAM(s) Oral at bedtime  cefepime   IVPB 1000 milliGRAM(s) IV Intermittent every 24 hours  chlorhexidine 0.12% Liquid 15 milliLiter(s) Oral Mucosa every 12 hours  chlorhexidine 2% Cloths 1 Application(s) Topical daily  dextrose 5%. 1000 milliLiter(s) IV Continuous <Continuous>  dextrose 5%. 1000 milliLiter(s) IV Continuous <Continuous>  dextrose 50% Injectable 25 Gram(s) IV Push once  dextrose 50% Injectable 12.5 Gram(s) IV Push once  dextrose 50% Injectable 25 Gram(s) IV Push once  epoetin reilly-epbx (RETACRIT) Injectable 36797 Unit(s) IV Push <User Schedule>  ferrous    sulfate Liquid 300 milliGRAM(s) Enteral Tube daily  glucagon  Injectable 1 milliGRAM(s) IntraMuscular once  heparin  Infusion 750 Unit(s)/Hr IV Continuous <Continuous>  hydrALAZINE 25 milliGRAM(s) Oral three times a day  insulin glargine Injectable (LANTUS) 28 Unit(s) SubCutaneous at bedtime  insulin lispro (ADMELOG) corrective regimen sliding scale   SubCutaneous every 6 hours  Nephro-noam 1 Tablet(s) Oral daily  pantoprazole   Suspension 40 milliGRAM(s) Oral before breakfast  vancomycin  IVPB 500 milliGRAM(s) IV Intermittent once    PRN Inpatient Medications  dextrose Oral Gel 15 Gram(s) Oral once PRN  sodium chloride 0.9% Bolus. 100 milliLiter(s) IV Bolus every 5 minutes PRN      REVIEW OF SYSTEMS  --------------------------------------------------------------------------------  General: no fever  MSK: no edema     VITALS/PHYSICAL EXAM  --------------------------------------------------------------------------------  T(C): 36.7 (02-22-25 @ 04:00), Max: 37 (02-21-25 @ 20:36)  HR: 70 (02-22-25 @ 07:37) (70 - 108)  BP: 126/62 (02-22-25 @ 04:00) (126/62 - 168/80)  RR: 16 (02-22-25 @ 04:00) (15 - 30)  SpO2: 100% (02-22-25 @ 07:37) (92% - 100%)  Wt(kg): --        02-21-25 @ 07:01  -  02-22-25 @ 07:00  --------------------------------------------------------  IN: 1748 mL / OUT: 1616 mL / NET: 132 mL      Physical Exam:  	General: Nonverbal, eyes open  Neck: +trach  Respiratory: CTAB, no wheezes, rales or rhonchi  Cardiovascular: S1, S2, RRR  Gastrointestinal: +peg  Extremities: No peripheral edema  LABS/STUDIES  --------------------------------------------------------------------------------              8.6    14.43 >-----------<  209      [02-21-25 @ 04:50]              27.7     133  |  92  |  40  ----------------------------<  227      [02-22-25 @ 05:30]  4.2   |  25  |  1.65        Ca     9.0     [02-22-25 @ 05:30]      Mg     2.80     [02-22-25 @ 05:30]      Phos  1.6     [02-22-25 @ 05:30]      PT/INR: PT 12.2 , INR 1.05       [02-21-25 @ 21:21]  PTT: 32.2       [02-21-25 @ 21:21]      Creatinine Trend:  SCr 1.65 [02-22 @ 05:30]  SCr 1.39 [02-21 @ 21:21]  SCr 2.88 [02-21 @ 04:50]  SCr 2.16 [02-20 @ 06:00]  SCr 3.62 [02-19 @ 06:45]    Urinalysis - [02-22-25 @ 05:30]      Color  / Appearance  / SG  / pH       Gluc 227 / Ketone   / Bili  / Urobili        Blood  / Protein  / Leuk Est  / Nitrite       RBC  / WBC  / Hyaline  / Gran  / Sq Epi  / Non Sq Epi  / Bacteria       Iron 46, TIBC 142, %sat 32      [02-19-25 @ 11:39]  Ferritin 3601      [02-19-25 @ 11:39]  PTH -- (Ca --)      [05-26-24 @ 01:20]   122  TSH 1.660      [10-05-24 @ 08:37]  Lipid: chol --, , HDL --, LDL --      [10-18-24 @ 06:00]    HBsAb 654.8      [02-19-25 @ 19:05]  HBsAg Nonreact      [02-19-25 @ 19:05]  HBcAb Nonreact      [02-19-25 @ 19:05]  HCV 0.19, Nonreact      [02-19-25 @ 19:05]

## 2025-02-22 NOTE — PROGRESS NOTE ADULT - ASSESSMENT
73 y/o M w trach/vent (last changed 10/23/24), CVA x2 with residual R hemiplegia, COPD, ESRD on HD MWF who presented to ED for leukocytosis when initially planned for fistulogram and noted to have a WBC of 18.     VAP, leukocytosis  SARS-CoV-2/ RSV/ flu PCR negative  MRSA PCR negative  CXR- Right lower lobe infiltrate  blood cultures- NGTD  prior resp cultures reviewed- pseudomonas and stenotrophomonas, sensitivities reviewed   vanc 500mg given after HD 2/21 2/22-noted elevated wbc, no clear  and no steroids given on med review  c/w cefepime 1g daily extended infusion dosing (renally adjusted) for now  c/w minocycline for now but unclear if adding to regimen with most recent sputum without steno      Asymptomatic Bacteriuria  UA w/ significant pyuria though noted epithelial cells indicating contamination  Culture - Urine (02.20.25 @ 18:00) >100,000 CFU/ml Enterococcus faecium  unable to assess urinary symptoms given patient's mentation  on repeat UA pyuria significantly improved without coverage of E faecium-no change in abx    Thank you for consulting us and involving us in the management of this most interesting and challenging case.  In addition to reviewing history, imaging, documents, labs, microbiology, took into account antibiotic stewardship, local antibiogram and infection control strategies and potential transmission issues.    We will follow along in the care of this patient. Please contact me by texting me directly on my cell# at [phone] using TEAMS or call our answering service at [phone] with any concerns.

## 2025-02-22 NOTE — PROGRESS NOTE ADULT - ASSESSMENT
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 Admitted to RCU for further management. Admitted to RCU for further management. ABX tailored to prior sputum with  PSA and steno, however UCx with enterococcus faecium.     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   - Prior TTE reviewed from 4/2024 with normal RVLVSF with no pericardial effusion noted at the time.   - BP currently stable   - Pending RPT ECHO   - Monitor hemodynamics     RESPIRATORY  # Respiratory failure  - Hx of COPD with chronic respiratory failure with tracheostomy and vent dependence  - Admitted for angioplasty of RUE AVF, however course complicated by leukocytosis with concern for recurrent trachitis/ PNA.   - Course complicated by hypoxia during HD likely volume down?   - Hold volume removal today   - Pending ECHO as above  - Continue on vent 15/400/5100 and wean FIO2 down as able.   - Hold PS for now  - Continue on nebs and HTS     GI  # Dysphagia with PEG   - Continue on PEG TF   - Monitor BMs    RENAL  # ESRD on HD MTTHF with AVF trouble   - Presented for RUE fistulogram and angioplasty with vascular on 2/18   - Resumed on HD with no flow issue from AVF   - Continue on HD MTTF per renal   - Monitor renal function, electrolytes and UOP     # Hyponatremia   - Concern for volume issues, however overall appears volume down   - Sodium 128 and will check urine lytes and serum osmo   - Trend sodium with HD for now     INFECTIOUS DISEASE  # Trachitis vs PNA/ UTI   - Hx of steno and PSA in sputum   - Flu/ COVID negative   - MRSA negative   - UCx with enterococcus   - SCx with GPR/ GNR   - Found with leukocytosis and continued on cefepime and christiano (2/19 - ) and vanco on HD days (2/21 - ) for now.   - ID following     # PPX   - Candida PCR pending   - MRSA PCR negative    HEME  # AOCD   - Presented for angioplasty and found with anemia to 6.7 s/p PRBC 2/19   - Anemia panel with concern for AOCD   - No overt signs of bleeding   - Continue on EPO and Fe   - Monitor      VASCULAR  # RUE DVT   - RUE edema noted with age-indeterminate, non-occlusive deep vein thrombosis noted in the right brachial vein.   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 Admitted to RCU for further management. Admitted to RCU for further management. ABX tailored to prior sputum with  PSA and steno, however UCx with enterococcus faecium.     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   - Prior TTE reviewed from 4/2024 with normal RVLVSF with no pericardial effusion noted at the time.   - BP currently stable   - Pending RPT ECHO   - Monitor hemodynamics     RESPIRATORY  # Respiratory failure  - Hx of COPD with chronic respiratory failure with tracheostomy and vent dependence  - Admitted for angioplasty of RUE AVF, however course complicated by leukocytosis with concern for recurrent trachitis/ PNA.   - Course complicated by hypoxia during HD likely volume down?   - Hold volume removal today   - Pending ECHO as above  - Continue on vent 15/400/5100 and wean FIO2 down as able.   - Hold PS for now  - Continue on nebs and HTS     GI  # Dysphagia with PEG   - Continue on PEG TF   - Monitor BMs    RENAL  # ESRD on HD MTTHF with AVF trouble   - Presented for RUE fistulogram and angioplasty with vascular on 2/18   - Resumed on HD with no flow issue from AVF   - Continue on HD MTTF per renal   - Monitor renal function, electrolytes and UOP     # Hyponatremia   - Concern for volume issues, however overall appears volume down   - Sodium 128 and will check urine lytes and serum osmo   - Trend sodium with HD for now     INFECTIOUS DISEASE  # Trachitis vs PNA/ UTI   - Hx of steno and PSA in sputum   - Flu/ COVID negative   - MRSA negative   - UCx with enterococcus   - SCx with GPR/ GNR   - Found with leukocytosis and continued on cefepime and christiano (2/19 - ) and vanco on HD days (2/21 - ) for now.   - ID following     # PPX   - Candida PCR pending   - MRSA PCR negative    HEME  # AOCD   - Presented for angioplasty and found with anemia to 6.7 s/p PRBC 2/19   - Anemia panel with concern for AOCD   - No overt signs of bleeding   - Continue on EPO and Fe   - Monitor      VASCULAR  # RUE DVT   - RUE edema noted with age-indeterminate, non-occlusive deep vein thrombosis noted in the right brachial vein.   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 Patent recently admitted in 6/2024 for left pontine and cerebellar CVA requiring tracheostomy. While at [facility] patient decannulated and passed SS with advanced diet to easy to chew with thin liquids, but complicated COVID requiring transfer to [facility] in 7/2024 and then ultimately discharged home. Per wife patient was doing well at home until 10/2024 when he was found with RLL with concern for aspiration requiring intubation, then tracheostomy, and ultimayely discharged to NH with vent dependence in 11/2024. Patient now presented for RUE fistulogram and angioplasty with vascular, however course complicated by leukocytosis with concern for recurrent trachitis vs PNA and UTI, AOCD and hyperglycemia. Admitted to RCU for further management. Admitted to RCU for further management. ABX tailored to prior sputum with  PSA and steno, however UCx with enterococcus faecium.     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   - Last CTH in 10/2024 with no acute findings   - Hold RPT CTH for now   - Monitor mentation     CARDIOVASCULAR  # Hx of HTN and HLD  - Continue on hydral 25 and norvasc 10   - Monitor BP     # Incidental Pericardial effusion   - Incidental small pericardial effusion seen adjacent to right ventricle, however IVC appears flat and LE without edema with low concern for RV failure.   - Prior TTE reviewed from 4/2024 with normal RVLVSF with no pericardial effusion noted at the time.   - BP currently stable   - Pending RPT ECHO   - Monitor hemodynamics     RESPIRATORY  # Respiratory failure second to PNA vs volume down vs PE?   - Hx of COPD with chronic respiratory failure with tracheostomy and vent dependence  - Admitted for angioplasty of RUE AVF, however course complicated by leukocytosis with concern for recurrent trachitis/ PNA.   - Course complicated by hypoxia during HD likely volume down vs migration of RUE brachial DVT with PE?   - Held volume removal, bolus given, and hyopxia improved.   - Pending CTA CHEST (scheduled for Monday preHD).  - Pending ECHO as above  - Continue on heparin GTT for now  - Continue on vent 15/400/50  - Hold PS for now  - Continue on nebs and HTS     HEENT   # Hemoptysis   - Wife reported hemoptysis while at nursing home 2 weeks prior to admission   - No hemoptysis noted on admission   - Pending CT CHEST as above   - Monitor for now    GI  # Dysphagia with PEG   - Passed SS with advanced diet to easy to chew with thin liquids in 7/2024, however since recurrent aspiration in 10/2024 now with PEG/ vent dependence   - Continue on PEG TF   - Monitor BMs    RENAL  # ESRD on HD MTTHF with AVF trouble   - Presented for RUE fistulogram and angioplasty with vascular on 2/18   - Resumed on HD with no flow issue from AVF   - Course complicated by hypoxia with concern for volume down given small IVC and elevated lactate on 2/21  - Monitor volume status and continue on HD MTTF per renal   - Monitor renal function, electrolytes and UOP     # Hyponatremia   - Concern for volume issues, however overall appears volume down   - Sodium 128 and improved with HD.   - Trend sodium with HD for now     INFECTIOUS DISEASE  # Trachitis vs PNA/ UTI   - Hx of steno and PSA in sputum   - Flu/ COVID negative   - MRSA negative   - UCx with enterococcus   - SCx with  PSA   - Found with leukocytosis and started on cefepime and christiano (2/19) and vanco on HD days (2/21).   - No further steno seen in sputum and continue on cefepime (2/19 - ) and vanco on HD days (2/21, 2/22 - ) for now   - ID following     # PPX   - Candida PCR pending   - MRSA PCR negative    HEME  # AOCD   - Presented for angioplasty and found with anemia to 6.7 s/p PRBC 2/19   - Anemia panel with concern for AOCD   - No overt signs of bleeding   - Continue on EPO and Fe   - Monitor HH     VASCULAR  # RUE DVT   - RUE edema noted with age-indeterminate, non-occlusive deep vein thrombosis noted in the right brachial vein.   - Case discussed with vascular who recommends full AC   - CTH from prior with encephalomalcia, however no hx of intracranial bleed   - Prior UGIB while on AC, however discharged on eliquis in 7/2024 and completed 6 month course for RLE DVT   - Continue on heparin GTT with patient specific PTT 60-80 given GIB hx.   - Case discussed with Wife who is apprehensive to full AC given bleeding hx, however she wishes for further investigation of pulmonary embolism and if positive she is amendable to risk of full anticoagulation. IF no PE found wife would like to monitor off of anticoagulation despite the risk of further peripheral DVTs.   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 Patent recently admitted in 6/2024 for left pontine and cerebellar CVA requiring tracheostomy. While at [facility] patient decannulated and passed SS with advanced diet to easy to chew with thin liquids, but complicated COVID requiring transfer to [facility] in 7/2024 and then ultimately discharged home. Per wife patient was doing well at home until 10/2024 when he was found with RLL with concern for aspiration requiring intubation, then tracheostomy, and ultimately discharged to NH with vent dependence in 11/2024. Patient now presented for RUE fistulogram and angioplasty with vascular, however course complicated by leukocytosis with concern for recurrent trachitis vs PNA and UTI, AOCD and hyperglycemia. Admitted to RCU for further management. Found with  PSA trachitis/ PNA and enterococcus faecium UTI. Course complicated by RUE brachial DVT and hypoxia with HD with concern for volume down vs PE?      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   - Last CTH in 10/2024 with no acute findings   - Hold [facility] CT for now   - Monitor mentation     CARDIOVASCULAR  # Hx of HTN and HLD  - Continue on hydral 25 and norvasc 10   - Monitor BP     # Incidental Pericardial effusion   - Incidental small pericardial effusion seen adjacent to right ventricle, however IVC appears flat and LE without edema with low concern for RV failure.   - Prior TTE reviewed from 4/2024 with normal RVLVSF with no pericardial effusion noted at the time.   - BP currently stable   - Pending RPT ECHO   - Monitor hemodynamics     RESPIRATORY  # Respiratory failure second to PNA vs volume down vs PE?   - Hx of COPD with chronic respiratory failure with tracheostomy and vent dependence  - Admitted for angioplasty of RUE AVF, however course complicated by leukocytosis with concern for recurrent trachitis/ PNA.   - Course complicated by hypoxia during HD likely volume down vs migration of RUE brachial DVT with PE?   - Held volume removal, bolus given, and hyopxia improved.   - Pending CTA CHEST (scheduled for Monday preHD).  - Pending ECHO as above  - Continue on heparin GTT for now  - Continue on vent 15/400/50  - Hold PS for now  - Continue on nebs and HTS     HEENT   # Hemoptysis   - Wife reported hemoptysis while at nursing home 2 weeks prior to admission   - No hemoptysis noted on admission   - Pending CT CHEST as above   - Monitor for now    GI  # Dysphagia with PEG   - Passed SS with advanced diet to easy to chew with thin liquids in 7/2024, however since recurrent aspiration in 10/2024 now with PEG/ vent dependence   - Continue on PEG TF   - Monitor BMs    RENAL  # ESRD on HD MTTHF with AVF trouble   - Presented for RUE fistulogram and angioplasty with vascular on 2/18   - Resumed on HD with no flow issue from AVF   - Course complicated by hypoxia with concern for volume down given small IVC and elevated lactate on 2/21  - Monitor volume status and continue on HD MTTF per renal   - Monitor renal function, electrolytes and UOP     # Hyponatremia   - Concern for volume issues, however overall appears volume down   - Sodium 128 and improved with HD.   - Trend sodium with HD for now     INFECTIOUS DISEASE  # Trachitis vs PNA/ UTI   - Hx of steno and PSA in sputum   - Flu/ COVID negative   - MRSA negative   - UCx with enterococcus   - SCx with  PSA   - Found with leukocytosis and started on cefepime and christiano (2/19) and vanco on HD days (2/21).   - No further steno seen in sputum and continue on cefepime (2/19 - ) and vanco on HD days (2/21, 2/22 - ) for now   - ID following     # PPX   - Candida PCR pending   - MRSA PCR negative    HEME  # AOCD   - Presented for angioplasty and found with anemia to 6.7 s/p PRBC 2/19   - Anemia panel with concern for AOCD   - No overt signs of bleeding   - Continue on EPO and Fe   - Monitor HH     VASCULAR  # RUE DVT   - RUE edema noted with age-indeterminate, non-occlusive deep vein thrombosis noted in the right brachial vein.   - Case discussed with vascular who recommends full AC   - CTH from prior with encephalomalcia, however no hx of intracranial bleed   - Prior UGIB while on AC, however discharged on eliquis in 7/2024 and completed 6 month course for RLE DVT   - Continue on heparin GTT with patient specific PTT 60-80 given GIB hx.   - Case discussed with Wife who is apprehensive to full AC given bleeding hx, however she wishes for further investigation of pulmonary embolism and if positive she is amendable to risk of full anticoagulation. IF no PE found wife would like to monitor off of anticoagulation despite the risk of further peripheral DVTs.   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 Patent recently admitted in 6/2024 for left pontine and cerebellar CVA requiring tracheostomy. While at [facility] patient decannulated and passed SS with advanced diet to easy to chew with thin liquids, but complicated COVID requiring transfer to [facility] in 7/2024 and then ultimately discharged home. Per wife patient was doing well at home until 10/2024 when he was found with RLL with concern for aspiration requiring intubation, then tracheostomy, and ultimately discharged to NH with vent dependence in 11/2024. Patient now presented for RUE fistulogram and angioplasty with vascular, however course complicated by leukocytosis with concern for recurrent trachitis vs PNA and UTI, AOCD and hyperglycemia. Admitted to RCU for further management. Found with  PSA trachitis/ PNA and enterococcus faecium UTI. Course complicated by RUE brachial DVT and hypoxia with HD with concern for volume down vs PE?      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   - Last CTH in 10/2024 with no acute findings   - Hold [facility] CT for now   - Monitor mentation     CARDIOVASCULAR  # Hx of HTN and HLD  - Continue on hydral 25 and norvasc 10   - Monitor BP     # Incidental Pericardial effusion   - Incidental small pericardial effusion seen adjacent to right ventricle, however IVC appears flat and LE without edema with low concern for RV failure.   - Prior TTE reviewed from 4/2024 with normal RVLVSF with no pericardial effusion noted at the time.   - BP currently stable   - Pending RPT ECHO   - Monitor hemodynamics     RESPIRATORY  # Respiratory failure second to PNA vs volume down vs PE?   - Hx of COPD with chronic respiratory failure with tracheostomy and vent dependence  - Admitted for angioplasty of RUE AVF, however course complicated by leukocytosis with concern for recurrent trachitis/ PNA.   - Course complicated by hypoxia during HD likely volume down vs migration of RUE brachial DVT with PE?   - Held volume removal, bolus given, and hyopxia improved.   - Pending CTA CHEST (scheduled for Monday preHD).  - Pending ECHO as above  - Continue on heparin GTT for now  - Continue on vent 15/400/50  - Hold PS for now  - Continue on nebs and HTS     HEENT   # Hemoptysis   - Wife reported hemoptysis while at nursing home 2 weeks prior to admission   - No hemoptysis noted on admission   - Pending CT CHEST as above   - Monitor for now    GI  # Dysphagia with PEG   - Passed SS with advanced diet to easy to chew with thin liquids in 7/2024, however since recurrent aspiration in 10/2024 now with PEG/ vent dependence   - Continue on PEG TF   - Monitor BMs    RENAL  # ESRD on HD MTTHF with AVF trouble   - Presented for RUE fistulogram and angioplasty with vascular on 2/18   - Resumed on HD with no flow issue from AVF   - Course complicated by hypoxia with concern for volume down given small IVC and elevated lactate on 2/21  - Monitor volume status and continue on HD MTTF per renal   - Monitor renal function, electrolytes and UOP     # Hyponatremia likely volume down   - Concern for volume issues, however overall appears volume down with serum osmo 307  - Sodium 128 and improved with HD/ bolus during HD.   - Trend sodium with HD for now     # Elevated lactate   - Lactate elevated likely second to volume down?   - Post bolus lactate trending down from 3.3 to 2.7   - Hold IVF given anuric and reassess volume removal on Monday pending ECHO as above   - Monitor lactate    INFECTIOUS DISEASE  # Trachitis vs PNA/ UTI   - Hx of steno and PSA in sputum   - Flu/ COVID negative   - MRSA negative   - UCx with enterococcus   - SCx with  PSA   - Found with leukocytosis and started on cefepime and christiano (2/19) and vanco on HD days (2/21).   - No further steno seen in sputum and continue on cefepime (2/19 - ) and vanco on HD days (2/21, 2/22 - ) for now   - ID following     # PPX   - Candida PCR pending   - MRSA PCR negative    HEME  # AOCD   - Presented for angioplasty and found with anemia to 6.7 s/p PRBC 2/19   - Anemia panel with concern for AOCD   - No overt signs of bleeding   - Continue on EPO and Fe   - Monitor HH     VASCULAR  # RUE DVT   - RUE edema noted with age-indeterminate, non-occlusive deep vein thrombosis noted in the right brachial vein.   - Case discussed with vascular who recommends full AC   - CTH from prior with encephalomalcia, however no hx of intracranial bleed   - Prior UGIB while on AC, however discharged on eliquis in 7/2024 and completed 6 month course for RLE DVT   - Continue on heparin GTT with patient specific PTT 60-80 given GIB hx.   - Case discussed with Wife who is apprehensive to full AC given bleeding hx, however she wishes for further investigation of pulmonary embolism and if positive she is amendable to risk of full anticoagulation. IF no PE found wife would like to monitor off of anticoagulation despite the risk of further peripheral DVTs.   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 Patent recently admitted in 6/2024 for left pontine and cerebellar CVA requiring tracheostomy. While at [facility] patient decannulated and passed SS with advanced diet to easy to chew with thin liquids, but complicated COVID requiring transfer to [facility] in 7/2024 and then ultimately discharged home. Per wife patient was doing well at home until 10/2024 when he was found with RLL with concern for aspiration requiring intubation, then tracheostomy, and ultimately discharged to NH with vent dependence in 11/2024. Patient now presented for RUE fistulogram and angioplasty with vascular, however course complicated by leukocytosis with concern for recurrent trachitis vs PNA and UTI, AOCD and hyperglycemia. Admitted to RCU for further management. Found with  PSA trachitis/ PNA and enterococcus faecium UTI. Course complicated by RUE brachial DVT and hypoxia with HD with concern for volume down vs PE?      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   - Last CTH in 10/2024 with no acute findings   - Hold [facility] CT for now   - Monitor mentation     CARDIOVASCULAR  # Hx of HTN and HLD  - Continue on hydral 25 and norvasc 10   - Monitor BP     # Incidental Pericardial effusion   - Incidental small pericardial effusion seen adjacent to right ventricle, however IVC appears flat and LE without edema with low concern for RV failure.   - Prior TTE reviewed from 4/2024 with normal RVLVSF with no pericardial effusion noted at the time.   - BP currently stable   - Pending RPT ECHO   - Monitor hemodynamics     RESPIRATORY  # Respiratory failure second to PNA vs volume down vs PE?   - Hx of COPD with chronic respiratory failure with tracheostomy and vent dependence  - Admitted for angioplasty of RUE AVF, however course complicated by leukocytosis with concern for recurrent trachitis/ PNA.   - Course complicated by hypoxia during HD likely volume down vs migration of RUE brachial DVT with PE?   - Held volume removal, bolus given, and hyopxia improved.   - Pending CTA CHEST (scheduled for Monday preHD).  - Pending ECHO as above  - Continue on heparin GTT for now  - Continue on vent 15/400/50  - Hold PS for now  - Continue on nebs     HEENT   # Hemoptysis   - Wife reported hemoptysis while at nursing home 2 weeks prior to admission   - No hemoptysis noted on admission   - Pending CT CHEST as above   - Monitor for now    GI  # Dysphagia with PEG   - Passed SS with advanced diet to easy to chew with thin liquids in 7/2024, however since recurrent aspiration in 10/2024 now with PEG/ vent dependence   - Continue on PEG TF   - Monitor BMs    RENAL  # ESRD on HD MTTHF with AVF trouble   - Presented for RUE fistulogram and angioplasty with vascular on 2/18   - Resumed on HD with no flow issue from AVF   - Course complicated by hypoxia with concern for volume down given small IVC and elevated lactate on 2/21  - Monitor volume status and continue on HD MTTF per renal   - Monitor renal function, electrolytes and UOP     # Hyponatremia likely volume down   - Concern for volume issues, however overall appears volume down with serum osmo 307  - Sodium 128 and improved with HD/ bolus during HD.   - Trend sodium with HD for now     # Elevated lactate   - Lactate elevated likely second to volume down?   - Post bolus lactate trending down from 3.3 to 2.7   - Hold IVF given anuric and reassess volume removal on Monday pending ECHO as above   - Monitor lactate    INFECTIOUS DISEASE  # Trachitis vs PNA/ UTI   - Hx of steno and PSA in sputum   - Flu/ COVID negative   - MRSA negative   - UCx with enterococcus   - SCx with  PSA   - Found with leukocytosis and started on cefepime and christiano (2/19) and vanco on HD days (2/21).   - No further steno seen in sputum and continue on cefepime (2/19 - ) and vanco on HD days (2/21, 2/22 - ) for now   - ID following     # PPX   - Candida PCR pending   - MRSA PCR negative    HEME  # AOCD   - Presented for angioplasty and found with anemia to 6.7 s/p PRBC 2/19   - Anemia panel with concern for AOCD   - No overt signs of bleeding   - Continue on EPO and Fe   - Monitor HH     VASCULAR  # RUE DVT   - RUE edema noted with age-indeterminate, non-occlusive deep vein thrombosis noted in the right brachial vein.   - Case discussed with vascular who recommends full AC   - CTH from prior with encephalomalcia, however no hx of intracranial bleed   - Prior UGIB while on AC, however discharged on eliquis in 7/2024 and completed 6 month course for RLE DVT   - Continue on heparin GTT with patient specific PTT 60-80 given GIB hx.   - Case discussed with Wife who is apprehensive to full AC given bleeding hx, however she wishes for further investigation of pulmonary embolism and if positive she is amendable to risk of full anticoagulation. IF no PE found wife would like to monitor off of anticoagulation despite the risk of further peripheral DVTs.   - Monitor HH     ENDOCRINE  # DM2 A1C 14.0 (1/2024) > 6.4 (2/2025)  - Presented with hyperglycemia and continued on lantus and ISS   - Increase lantus to 28U QHS and ISS, however FS continues to trend in 200s   - Adjust insulin to NPH 12U Q6H (0600, 1200, 1800 and 0000) with tube feeds ([id number])    - Monitor FS    ETHICS/ GOC    - FULL CODE     DISPO - Back to NH when able

## 2025-02-22 NOTE — PROGRESS NOTE ADULT - NS ATTEND AMEND GEN_ALL_CORE FT
74-year-old man with a past medical history of CVA with residual hemiplegia COPD, ESRD on dialysis, chronic hypoxic hypercapnic respiratory failure tracheostomy and vent dependent, PEG in place, pressure ulcer was sent to the ED for leukocytosis.  Patient initially presented to the Cath Lab for fistulogram with vascular surgery.  He was found to have a white count and sent to the emergency room.  Infectious workup suspicious for UTI vs pneumonia. Currently on antibiotics.  Recent events include new brachial DVT with episode of hypoxia and tachycardia on HD, with concern for PE.  He is empirically anticoagulated for the PE and hes HD stable now.  Being treated for  pseudomonas, e. Faecium in urine culture and on Vanco.  Hyperglycemic, now on NPH.    Agree with detailed plan as outlined above.

## 2025-02-23 LAB
-  AMPICILLIN: SIGNIFICANT CHANGE UP
-  CIPROFLOXACIN: SIGNIFICANT CHANGE UP
-  LEVOFLOXACIN: SIGNIFICANT CHANGE UP
-  NITROFURANTOIN: SIGNIFICANT CHANGE UP
-  TETRACYCLINE: SIGNIFICANT CHANGE UP
-  VANCOMYCIN: SIGNIFICANT CHANGE UP
ANION GAP SERPL CALC-SCNC: 15 MMOL/L — HIGH (ref 7–14)
APTT BLD: 30 SEC — SIGNIFICANT CHANGE UP (ref 24.5–35.6)
APTT BLD: 30.6 SEC — SIGNIFICANT CHANGE UP (ref 24.5–35.6)
BASOPHILS # BLD AUTO: 0.04 K/UL — SIGNIFICANT CHANGE UP (ref 0–0.2)
BASOPHILS NFR BLD AUTO: 0.3 % — SIGNIFICANT CHANGE UP (ref 0–2)
BLOOD GAS ARTERIAL COMPREHENSIVE RESULT: SIGNIFICANT CHANGE UP
BUN SERPL-MCNC: 62 MG/DL — HIGH (ref 7–23)
CALCIUM SERPL-MCNC: 8.6 MG/DL — SIGNIFICANT CHANGE UP (ref 8.4–10.5)
CHLORIDE SERPL-SCNC: 90 MMOL/L — LOW (ref 98–107)
CO2 SERPL-SCNC: 24 MMOL/L — SIGNIFICANT CHANGE UP (ref 22–31)
CREAT SERPL-MCNC: 2.49 MG/DL — HIGH (ref 0.5–1.3)
CULTURE RESULTS: ABNORMAL
CULTURE RESULTS: SIGNIFICANT CHANGE UP
CULTURE RESULTS: SIGNIFICANT CHANGE UP
EGFR: 27 ML/MIN/1.73M2 — LOW
EGFR: 27 ML/MIN/1.73M2 — LOW
EOSINOPHIL # BLD AUTO: 0.37 K/UL — SIGNIFICANT CHANGE UP (ref 0–0.5)
EOSINOPHIL # BLD AUTO: 0.41 K/UL — SIGNIFICANT CHANGE UP (ref 0–0.5)
EOSINOPHIL NFR BLD AUTO: 2.4 % — SIGNIFICANT CHANGE UP (ref 0–6)
GLUCOSE BLDC GLUCOMTR-MCNC: 169 MG/DL — HIGH (ref 70–99)
GLUCOSE BLDC GLUCOMTR-MCNC: 217 MG/DL — HIGH (ref 70–99)
GLUCOSE BLDC GLUCOMTR-MCNC: 80 MG/DL — SIGNIFICANT CHANGE UP (ref 70–99)
HCT VFR BLD CALC: 20.8 % — CRITICAL LOW (ref 39–50)
HCT VFR BLD CALC: 21.1 % — LOW (ref 39–50)
HGB BLD-MCNC: 6.5 G/DL — CRITICAL LOW (ref 13–17)
HGB BLD-MCNC: 6.5 G/DL — CRITICAL LOW (ref 13–17)
HGB BLD-MCNC: 8 G/DL — LOW (ref 13–17)
IANC: 13.06 K/UL — HIGH (ref 1.8–7.4)
IMM GRANULOCYTES NFR BLD AUTO: 0.5 % — SIGNIFICANT CHANGE UP (ref 0–0.9)
IMM GRANULOCYTES NFR BLD AUTO: 0.6 % — SIGNIFICANT CHANGE UP (ref 0–0.9)
LYMPHOCYTES # BLD AUTO: 0.82 K/UL — LOW (ref 1–3.3)
LYMPHOCYTES # BLD AUTO: 0.84 K/UL — LOW (ref 1–3.3)
LYMPHOCYTES # BLD AUTO: 5.5 % — LOW (ref 13–44)
LYMPHOCYTES # BLD AUTO: 5.7 % — LOW (ref 13–44)
MAGNESIUM SERPL-MCNC: 3 MG/DL — HIGH (ref 1.6–2.6)
MCHC RBC-ENTMCNC: 23.2 PG — LOW (ref 27–34)
MCHC RBC-ENTMCNC: 23.4 PG — LOW (ref 27–34)
MCHC RBC-ENTMCNC: 30.8 G/DL — LOW (ref 32–36)
MCHC RBC-ENTMCNC: 31.3 G/DL — LOW (ref 32–36)
MCHC RBC-ENTMCNC: 32.7 G/DL — SIGNIFICANT CHANGE UP (ref 32–36)
MCV RBC AUTO: 74.7 FL — LOW (ref 80–100)
MCV RBC AUTO: 74.8 FL — LOW (ref 80–100)
METHOD TYPE: SIGNIFICANT CHANGE UP
MONOCYTES # BLD AUTO: 0.95 K/UL — HIGH (ref 0–0.9)
MONOCYTES # BLD AUTO: 1 K/UL — HIGH (ref 0–0.9)
MONOCYTES NFR BLD AUTO: 6.5 % — SIGNIFICANT CHANGE UP (ref 2–14)
MONOCYTES NFR BLD AUTO: 6.6 % — SIGNIFICANT CHANGE UP (ref 2–14)
NEUTROPHILS # BLD AUTO: 12.01 K/UL — HIGH (ref 1.8–7.4)
NEUTROPHILS # BLD AUTO: 13.06 K/UL — HIGH (ref 1.8–7.4)
NEUTROPHILS NFR BLD AUTO: 83.9 % — HIGH (ref 43–77)
NRBC # BLD AUTO: 0.13 K/UL — HIGH (ref 0–0)
NRBC # BLD AUTO: 0.14 K/UL — HIGH (ref 0–0)
NRBC # BLD AUTO: 0.14 K/UL — HIGH (ref 0–0)
NRBC # FLD: 0.13 K/UL — HIGH (ref 0–0)
NRBC # FLD: 0.14 K/UL — HIGH (ref 0–0)
NRBC # FLD: 0.14 K/UL — HIGH (ref 0–0)
NRBC BLD AUTO-RTO: 0 /100 WBCS — SIGNIFICANT CHANGE UP (ref 0–0)
ORGANISM # SPEC MICROSCOPIC CNT: ABNORMAL
ORGANISM # SPEC MICROSCOPIC CNT: ABNORMAL
PHOSPHATE SERPL-MCNC: 4.3 MG/DL — SIGNIFICANT CHANGE UP (ref 2.5–4.5)
PLATELET # BLD AUTO: 208 K/UL — SIGNIFICANT CHANGE UP (ref 150–400)
PLATELET # BLD AUTO: 214 K/UL — SIGNIFICANT CHANGE UP (ref 150–400)
POTASSIUM SERPL-MCNC: 3.5 MMOL/L — SIGNIFICANT CHANGE UP (ref 3.5–5.3)
POTASSIUM SERPL-SCNC: 3.5 MMOL/L — SIGNIFICANT CHANGE UP (ref 3.5–5.3)
RBC # BLD: 2.78 M/UL — LOW (ref 4.2–5.8)
RBC # BLD: 2.8 M/UL — LOW (ref 4.2–5.8)
RBC # BLD: 3.28 M/UL — LOW (ref 4.2–5.8)
RBC # FLD: 18.6 % — HIGH (ref 10.3–14.5)
RBC # FLD: 18.8 % — HIGH (ref 10.3–14.5)
RBC # FLD: 18.8 % — HIGH (ref 10.3–14.5)
SODIUM SERPL-SCNC: 129 MMOL/L — LOW (ref 135–145)
SPECIMEN SOURCE: SIGNIFICANT CHANGE UP
WBC # BLD: 14.31 K/UL — HIGH (ref 3.8–10.5)
WBC # BLD: 14.46 K/UL — HIGH (ref 3.8–10.5)
WBC # BLD: 15.39 K/UL — HIGH (ref 3.8–10.5)
WBC # FLD AUTO: 14.31 K/UL — HIGH (ref 3.8–10.5)
WBC # FLD AUTO: 15.39 K/UL — HIGH (ref 3.8–10.5)

## 2025-02-23 PROCEDURE — 93356 MYOCRD STRAIN IMG SPCKL TRCK: CPT

## 2025-02-23 PROCEDURE — 99233 SBSQ HOSP IP/OBS HIGH 50: CPT

## 2025-02-23 PROCEDURE — 93306 TTE W/DOPPLER COMPLETE: CPT | Mod: 26

## 2025-02-23 PROCEDURE — 76376 3D RENDER W/INTRP POSTPROCES: CPT | Mod: 26

## 2025-02-23 RX ORDER — HEPARIN SODIUM 1000 [USP'U]/ML
1000 INJECTION INTRAVENOUS; SUBCUTANEOUS
Qty: 25000 | Refills: 0 | Status: DISCONTINUED | OUTPATIENT
Start: 2025-02-23 | End: 2025-02-23

## 2025-02-23 RX ORDER — HEPARIN SODIUM 1000 [USP'U]/ML
1300 INJECTION INTRAVENOUS; SUBCUTANEOUS
Qty: 25000 | Refills: 0 | Status: DISCONTINUED | OUTPATIENT
Start: 2025-02-23 | End: 2025-02-24

## 2025-02-23 RX ORDER — HEPARIN SODIUM 1000 [USP'U]/ML
1400 INJECTION INTRAVENOUS; SUBCUTANEOUS
Qty: 25000 | Refills: 0 | Status: DISCONTINUED | OUTPATIENT
Start: 2025-02-23 | End: 2025-02-23

## 2025-02-23 RX ORDER — EPOETIN ALFA 10000 [IU]/ML
10000 SOLUTION INTRAVENOUS; SUBCUTANEOUS
Refills: 0 | Status: DISCONTINUED | OUTPATIENT
Start: 2025-02-23 | End: 2025-02-26

## 2025-02-23 RX ADMIN — Medication 25 MILLIGRAM(S): at 21:40

## 2025-02-23 RX ADMIN — Medication 40 MILLIGRAM(S): at 05:33

## 2025-02-23 RX ADMIN — IPRATROPIUM BROMIDE AND ALBUTEROL SULFATE 3 MILLILITER(S): .5; 2.5 SOLUTION RESPIRATORY (INHALATION) at 15:33

## 2025-02-23 RX ADMIN — Medication 15 MILLILITER(S): at 05:33

## 2025-02-23 RX ADMIN — INSULIN LISPRO 1: 100 INJECTION, SOLUTION INTRAVENOUS; SUBCUTANEOUS at 17:58

## 2025-02-23 RX ADMIN — HEPARIN SODIUM 10 UNIT(S)/HR: 1000 INJECTION INTRAVENOUS; SUBCUTANEOUS at 19:32

## 2025-02-23 RX ADMIN — Medication 1 TABLET(S): at 11:42

## 2025-02-23 RX ADMIN — Medication 16 UNIT(S): at 17:59

## 2025-02-23 RX ADMIN — CEFEPIME 33.33 MILLIGRAM(S): 2 INJECTION, POWDER, FOR SOLUTION INTRAVENOUS at 22:09

## 2025-02-23 RX ADMIN — HEPARIN SODIUM 12 UNIT(S)/HR: 1000 INJECTION INTRAVENOUS; SUBCUTANEOUS at 04:10

## 2025-02-23 RX ADMIN — INSULIN LISPRO 2: 100 INJECTION, SOLUTION INTRAVENOUS; SUBCUTANEOUS at 00:11

## 2025-02-23 RX ADMIN — Medication 12 UNIT(S): at 05:33

## 2025-02-23 RX ADMIN — Medication 25 MILLIGRAM(S): at 14:49

## 2025-02-23 RX ADMIN — Medication 12 UNIT(S): at 00:11

## 2025-02-23 RX ADMIN — IPRATROPIUM BROMIDE AND ALBUTEROL SULFATE 3 MILLILITER(S): .5; 2.5 SOLUTION RESPIRATORY (INHALATION) at 03:15

## 2025-02-23 RX ADMIN — Medication 1 APPLICATION(S): at 12:10

## 2025-02-23 RX ADMIN — ATORVASTATIN CALCIUM 20 MILLIGRAM(S): 80 TABLET, FILM COATED ORAL at 21:35

## 2025-02-23 RX ADMIN — Medication 300 MILLIGRAM(S): at 11:42

## 2025-02-23 RX ADMIN — HEPARIN SODIUM 10 UNIT(S)/HR: 1000 INJECTION INTRAVENOUS; SUBCUTANEOUS at 16:13

## 2025-02-23 RX ADMIN — AMLODIPINE BESYLATE 10 MILLIGRAM(S): 10 TABLET ORAL at 05:34

## 2025-02-23 RX ADMIN — Medication 25 MILLIGRAM(S): at 05:34

## 2025-02-23 RX ADMIN — INSULIN LISPRO 1: 100 INJECTION, SOLUTION INTRAVENOUS; SUBCUTANEOUS at 05:32

## 2025-02-23 RX ADMIN — Medication 15 MILLILITER(S): at 17:58

## 2025-02-23 RX ADMIN — IPRATROPIUM BROMIDE AND ALBUTEROL SULFATE 3 MILLILITER(S): .5; 2.5 SOLUTION RESPIRATORY (INHALATION) at 20:02

## 2025-02-23 RX ADMIN — IPRATROPIUM BROMIDE AND ALBUTEROL SULFATE 3 MILLILITER(S): .5; 2.5 SOLUTION RESPIRATORY (INHALATION) at 08:22

## 2025-02-23 NOTE — PROGRESS NOTE ADULT - ASSESSMENT
75 y/o M w trach/vent (last changed 10/23/24), CVA x2 with residual R hemiplegia, COPD, ESRD on HD MWF who presented to ED for leukocytosis when initially planned for fistulogram and noted to have a WBC of 18.     VAP, leukocytosis  SARS-CoV-2/ RSV/ flu PCR negative  MRSA PCR negative  CXR- Right lower lobe infiltrate  blood cultures- NGTD  prior resp cultures reviewed- pseudomonas and stenotrophomonas, sensitivities reviewed   vanc 500mg given after HD 2/21 2/22-noted elevated wbc, no clear  and no steroids given on med review  c/w cefepime 1g daily extended infusion dosing (renally adjusted) for now  minocycline stopped      Asymptomatic Bacteriuria  UA w/ significant pyuria though noted epithelial cells indicating contamination  Culture - Urine (02.20.25 @ 18:00) >100,000 CFU/ml Enterococcus faecium  unable to assess urinary symptoms given patient's mentation  on repeat UA pyuria significantly improved without coverage of E faecium-no change in abx    Thank you for consulting us and involving us in the management of this most interesting and challenging case.  In addition to reviewing history, imaging, documents, labs, microbiology, took into account antibiotic stewardship, local antibiogram and infection control strategies and potential transmission issues.    We will follow along in the care of this patient. Please contact me by texting me directly on my cell# at [phone] using TEAMS or call our answering service at [phone] with any concerns.    Starting tomorrow Dr Steven Torres will be covering this patient so please contact him with further questions office [phone] or our answering service at [phone]       75 y/o M w trach/vent (last changed 10/23/24), CVA x2 with residual R hemiplegia, COPD, ESRD on HD MWF who presented to ED for leukocytosis when initially planned for fistulogram and noted to have a WBC of 18.     VAP, leukocytosis  SARS-CoV-2/ RSV/ flu PCR negative  MRSA PCR negative  CXR- Right lower lobe infiltrate  blood cultures- NGTD  prior resp cultures reviewed- pseudomonas and stenotrophomonas, sensitivities reviewed  Culture - Sputum . (02.19.25 @ 15:54) Pseudomonas aeruginosa (Carbapenem Resistant)  -  Cefepime: S 8   vanc 500mg given after HD 2/21 2/22-noted elevated wbc, no clear  and no steroids given on med review  c/w cefepime 1g daily extended infusion dosing (renally adjusted) for now  minocycline stopped      Asymptomatic Bacteriuria  UA w/ significant pyuria though noted epithelial cells indicating contamination  Culture - Urine (02.20.25 @ 18:00) >100,000 CFU/ml Enterococcus faecium  unable to assess urinary symptoms given patient's mentation  on repeat UA pyuria significantly improved without coverage of E faecium-no change in abx    Thank you for consulting us and involving us in the management of this most interesting and challenging case.  In addition to reviewing history, imaging, documents, labs, microbiology, took into account antibiotic stewardship, local antibiogram and infection control strategies and potential transmission issues.    We will follow along in the care of this patient. Please contact me by texting me directly on my cell# at [phone] using TEAMS or call our answering service at [phone] with any concerns.    Starting tomorrow Dr Steven Torres will be covering this patient so please contact him with further questions office [phone] or our answering service at [phone]

## 2025-02-23 NOTE — PROGRESS NOTE ADULT - ASSESSMENT
74-year-old male hx trach/vent - , CVA, COPD, stage renal disease on dialysis 4 times a week (MTRF) history of pressure ulcer.  Patient presents the ED today for elevated white count. nephrology consulted for dialysis needs    ESRD:  from [facility]  On HD MTTsF via an A-V fistula. s/p fistulogram by vascular 2/18  Continue MWF  consent from wife in dialysis unit  monitor electrolyte     htn  controlled  continue  norvasc 10 daily and hydralazine 25 tid  max uf as tolerated  up titrate hydralazine if sbp >150 persistently  monitor    Anemia:  - Transfuse for Hg < 7.  PRBC TODAY  epo with hd  iron sat >20  monitor    leukocytosis  sepsis work up per team    hyponatremia  in setting of esrd and hyperglycemia  optimize dm control  hd per schedule  monitor

## 2025-02-23 NOTE — PROGRESS NOTE ADULT - SUBJECTIVE AND OBJECTIVE BOX
[facility]  Kenton Fox MD PhD, Bell Wheatley MD, Saray Toribio MD, Delano Ross MD, Steven Torres MD  and providing coverage with Radha Duvall MD  Providing Infectious Disease Consultations at [facility], [facility], [place], [place], [facility], [facility]'s    Office# [phone] to schedule follow up appointments  Answering Service for urgent calls or New Consults [phone]  Cell# to text for urgent issues Kenton Fox [phone]     infectious diseases progress note:    JIMMY BLAND is a 74y y. o. Male patient    Overnight and events of the last 24hrs reviewed    Allergies    No Known Allergies    Intolerances        ANTIBIOTICS/RELEVANT:  antimicrobials  cefepime   IVPB 1000 milliGRAM(s) IV Intermittent every 24 hours    immunologic:  epoetin reilly-epbx (RETACRIT) Injectable 99953 Unit(s) IV Push <User Schedule>    OTHER:  albuterol/ipratropium for Nebulization 3 milliLiter(s) Nebulizer every 6 hours  amLODIPine   Tablet 10 milliGRAM(s) Oral daily  atorvastatin 20 milliGRAM(s) Oral at bedtime  chlorhexidine 0.12% Liquid 15 milliLiter(s) Oral Mucosa every 12 hours  chlorhexidine 2% Cloths 1 Application(s) Topical daily  dextrose 5%. 1000 milliLiter(s) IV Continuous <Continuous>  dextrose 5%. 1000 milliLiter(s) IV Continuous <Continuous>  dextrose 50% Injectable 25 Gram(s) IV Push once  dextrose 50% Injectable 12.5 Gram(s) IV Push once  dextrose 50% Injectable 25 Gram(s) IV Push once  dextrose Oral Gel 15 Gram(s) Oral once PRN  ferrous    sulfate Liquid 300 milliGRAM(s) Enteral Tube daily  glucagon  Injectable 1 milliGRAM(s) IntraMuscular once  heparin  Infusion 1000 Unit(s)/Hr IV Continuous <Continuous>  hydrALAZINE 25 milliGRAM(s) Oral three times a day  insulin lispro (ADMELOG) corrective regimen sliding scale   SubCutaneous every 6 hours  insulin NPH human recombinant 16 Unit(s) SubCutaneous <User Schedule>  Nephro-noam 1 Tablet(s) Oral daily  pantoprazole   Suspension 40 milliGRAM(s) Oral before breakfast  sodium chloride 0.9% Bolus. 100 milliLiter(s) IV Bolus every 5 minutes PRN      Objective:  Vital Signs Last 24 Hrs  T(C): 37 (23 Feb 2025 11:42), Max: 37.1 (23 Feb 2025 00:00)  T(F): 98.6 (23 Feb 2025 11:42), Max: 98.7 (23 Feb 2025 00:00)  HR: 70 (23 Feb 2025 15:38) (69 - 86)  BP: 133/58 (23 Feb 2025 14:40) (120/52 - 133/58)  BP(mean): 72 (23 Feb 2025 04:00) (72 - 75)  RR: 16 (23 Feb 2025 11:42) (15 - 17)  SpO2: 100% (23 Feb 2025 15:38) (100% - 100%)    Parameters below as of 23 Feb 2025 15:37  Patient On (Oxygen Delivery Method): ventilator        T(C): 37 (02-23-25 @ 11:42), Max: 37.1 (02-23-25 @ 00:00)  T(C): 37 (02-23-25 @ 11:42), Max: 37.1 (02-23-25 @ 00:00)  T(C): 37 (02-23-25 @ 11:42), Max: 37.1 (02-23-25 @ 00:00)    PHYSICAL EXAM:  HEENT: NC atraumatic  Neck: supple  Respiratory: no accessory muscle use, breathing comfortably  Cardiovascular: distant  Gastrointestinal: normal appearing, nondistended  Extremities: no clubbing, no cyanosis,    Mode: AC/ CMV (Assist Control/ Continuous Mandatory Ventilation), RR (machine): 15, TV (machine): 400, FiO2: 40, PEEP: 5, ITime: 0.66, MAP: 9, PIP: 24    LABS:                          8.0    14.46 )-----------( 215      ( 23 Feb 2025 15:06 )             24.5       WBC  14.46 02-23 @ 15:06  15.39 02-23 @ 06:30  14.31 02-23 @ 05:20  20.02 02-22 @ 10:07  14.43 02-21 @ 04:50  11.40 02-20 @ 06:00  13.53 02-19 @ 18:00  14.38 02-19 @ 09:23  14.57 02-19 @ 06:45  18.58 02-18 @ 16:00  18.60 02-18 @ 08:34      02-23    129[L]  |  90[L]  |  62[H]  ----------------------------<  175[H]  3.5   |  24  |  2.49[H]    Ca    8.6      23 Feb 2025 05:20  Phos  4.3     02-23  Mg     3.00     02-23        Creatinine: 2.49 mg/dL (02-23-25 @ 05:20)  Creatinine: 1.65 mg/dL (02-22-25 @ 05:30)  Creatinine: 1.39 mg/dL (02-21-25 @ 21:21)  Creatinine: 2.88 mg/dL (02-21-25 @ 04:50)  Creatinine: 2.16 mg/dL (02-20-25 @ 06:00)  Creatinine: 3.62 mg/dL (02-19-25 @ 06:45)  Creatinine: 3.08 mg/dL (02-18-25 @ 16:00)  Creatinine: 2.79 mg/dL (02-18-25 @ 08:34)      PT/INR - ( 22 Feb 2025 10:07 )   PT: 12.4 sec;   INR: 1.04 ratio         PTT - ( 23 Feb 2025 05:20 )  PTT:30.6 sec  Urinalysis Basic - ( 23 Feb 2025 05:20 )    Color: x / Appearance: x / SG: x / pH: x  Gluc: 175 mg/dL / Ketone: x  / Bili: x / Urobili: x   Blood: x / Protein: x / Nitrite: x   Leuk Esterase: x / RBC: x / WBC x   Sq Epi: x / Non Sq Epi: x / Bacteria: x            INFLAMMATORY MARKERS      MICROBIOLOGY:              RADIOLOGY & ADDITIONAL STUDIES:   [facility]  Kenton Fox MD PhD, Bell Wheatley MD, Saray Toribio MD, Delano Ross MD, Steven Torres MD  and providing coverage with Radha Duvall MD  Providing Infectious Disease Consultations at [facility], [facility], [place], [place], [facility], [facility]'s    Office# [phone] to schedule follow up appointments  Answering Service for urgent calls or New Consults [phone]  Cell# to text for urgent issues Kenton Fox [phone]     infectious diseases progress note:    JIMMY BLAND is a 74y y. o. Male patient    Overnight and events of the last 24hrs reviewed    Allergies    No Known Allergies    Intolerances        ANTIBIOTICS/RELEVANT:  antimicrobials  cefepime   IVPB 1000 milliGRAM(s) IV Intermittent every 24 hours    immunologic:  epoetin reilly-epbx (RETACRIT) Injectable 34939 Unit(s) IV Push <User Schedule>    OTHER:  albuterol/ipratropium for Nebulization 3 milliLiter(s) Nebulizer every 6 hours  amLODIPine   Tablet 10 milliGRAM(s) Oral daily  atorvastatin 20 milliGRAM(s) Oral at bedtime  chlorhexidine 0.12% Liquid 15 milliLiter(s) Oral Mucosa every 12 hours  chlorhexidine 2% Cloths 1 Application(s) Topical daily  dextrose 5%. 1000 milliLiter(s) IV Continuous <Continuous>  dextrose 5%. 1000 milliLiter(s) IV Continuous <Continuous>  dextrose 50% Injectable 25 Gram(s) IV Push once  dextrose 50% Injectable 12.5 Gram(s) IV Push once  dextrose 50% Injectable 25 Gram(s) IV Push once  dextrose Oral Gel 15 Gram(s) Oral once PRN  ferrous    sulfate Liquid 300 milliGRAM(s) Enteral Tube daily  glucagon  Injectable 1 milliGRAM(s) IntraMuscular once  heparin  Infusion 1000 Unit(s)/Hr IV Continuous <Continuous>  hydrALAZINE 25 milliGRAM(s) Oral three times a day  insulin lispro (ADMELOG) corrective regimen sliding scale   SubCutaneous every 6 hours  insulin NPH human recombinant 16 Unit(s) SubCutaneous <User Schedule>  Nephro-noam 1 Tablet(s) Oral daily  pantoprazole   Suspension 40 milliGRAM(s) Oral before breakfast  sodium chloride 0.9% Bolus. 100 milliLiter(s) IV Bolus every 5 minutes PRN      Objective:  Vital Signs Last 24 Hrs  T(C): 37 (23 Feb 2025 11:42), Max: 37.1 (23 Feb 2025 00:00)  T(F): 98.6 (23 Feb 2025 11:42), Max: 98.7 (23 Feb 2025 00:00)  HR: 70 (23 Feb 2025 15:38) (69 - 86)  BP: 133/58 (23 Feb 2025 14:40) (120/52 - 133/58)  BP(mean): 72 (23 Feb 2025 04:00) (72 - 75)  RR: 16 (23 Feb 2025 11:42) (15 - 17)  SpO2: 100% (23 Feb 2025 15:38) (100% - 100%)    Parameters below as of 23 Feb 2025 15:37  Patient On (Oxygen Delivery Method): ventilator        T(C): 37 (02-23-25 @ 11:42), Max: 37.1 (02-23-25 @ 00:00)  T(C): 37 (02-23-25 @ 11:42), Max: 37.1 (02-23-25 @ 00:00)  T(C): 37 (02-23-25 @ 11:42), Max: 37.1 (02-23-25 @ 00:00)    PHYSICAL EXAM:  HEENT: NC atraumatic  Neck: supple  Respiratory: no accessory muscle use, breathing comfortably  Cardiovascular: distant  Gastrointestinal: normal appearing, nondistended  Extremities: no clubbing, no cyanosis,    Mode: AC/ CMV (Assist Control/ Continuous Mandatory Ventilation), RR (machine): 15, TV (machine): 400, FiO2: 40, PEEP: 5, ITime: 0.66, MAP: 9, PIP: 24    LABS:                          8.0    14.46 )-----------( 215      ( 23 Feb 2025 15:06 )             24.5       WBC  14.46 02-23 @ 15:06  15.39 02-23 @ 06:30  14.31 02-23 @ 05:20  20.02 02-22 @ 10:07  14.43 02-21 @ 04:50  11.40 02-20 @ 06:00  13.53 02-19 @ 18:00  14.38 02-19 @ 09:23  14.57 02-19 @ 06:45  18.58 02-18 @ 16:00  18.60 02-18 @ 08:34      02-23    129[L]  |  90[L]  |  62[H]  ----------------------------<  175[H]  3.5   |  24  |  2.49[H]    Ca    8.6      23 Feb 2025 05:20  Phos  4.3     02-23  Mg     3.00     02-23        Creatinine: 2.49 mg/dL (02-23-25 @ 05:20)  Creatinine: 1.65 mg/dL (02-22-25 @ 05:30)  Creatinine: 1.39 mg/dL (02-21-25 @ 21:21)  Creatinine: 2.88 mg/dL (02-21-25 @ 04:50)  Creatinine: 2.16 mg/dL (02-20-25 @ 06:00)  Creatinine: 3.62 mg/dL (02-19-25 @ 06:45)  Creatinine: 3.08 mg/dL (02-18-25 @ 16:00)  Creatinine: 2.79 mg/dL (02-18-25 @ 08:34)      PT/INR - ( 22 Feb 2025 10:07 )   PT: 12.4 sec;   INR: 1.04 ratio         PTT - ( 23 Feb 2025 05:20 )  PTT:30.6 sec  Urinalysis Basic - ( 23 Feb 2025 05:20 )    Color: x / Appearance: x / SG: x / pH: x  Gluc: 175 mg/dL / Ketone: x  / Bili: x / Urobili: x   Blood: x / Protein: x / Nitrite: x   Leuk Esterase: x / RBC: x / WBC x   Sq Epi: x / Non Sq Epi: x / Bacteria: x            INFLAMMATORY MARKERS      MICROBIOLOGY:    Culture - Urine (02.20.25 @ 18:00)    -  Nitrofurantoin: S <=32 Should not be used to treat pyelonephritis.   -  Tetracycline: R >8   -  Ampicillin: R >8 Predicts results to ampicillin/sulbactam, amoxacillin-clavulanate and  piperacillin-tazobactam.   -  Ciprofloxacin: R >2   -  Levofloxacin: R >4   -  Vancomycin: S 0.5   Specimen Source: Clean Catch Clean Catch (Midstream)   Culture Results:   >100,000 CFU/ml Enterococcus faecium   Organism Identification: Enterococcus faecium   Organism: Enterococcus faecium   Method Type: VIDA    Culture - Sputum . (02.19.25 @ 15:54)    -  Meropenem: R >8   -  Levofloxacin: S <=0.5   -  Ciprofloxacin: S <=0.25   -  Piperacillin/Tazobactam: R 64   -  Imipenem: R >8   -  Ceftolozane/tazobactam: S <=2   -  Ceftazidime/Avibactam: S <=4   -  Resistance Gene to Carbapenem: Nondet   Gram Stain:   Numerous polymorphonuclear leukocytes per low power field  Rare Squamous epithelial cells per low power field  Numerous Gram Negative Rods seen per oil power field  Few Gram Positive Rods seen per oil power field   -  Ceftazidime: I 16   -  Cefepime: S 8   -  Aztreonam: R >16   Specimen Source: Trach Asp Tracheal Aspirate   Culture Results:   Few Pseudomonas aeruginosa (Carbapenem Resistant)  Commensal dominick consistent with body site   Organism Identification: Pseudomonas aeruginosa (Carbapenem Resistant)   Organism: Pseudomonas aeruginosa (Carbapenem Resistant)   Organism: Pseudomonas aeruginosa (Carbapenem Resistant)   Method Type: CarbaR   Method Type: VIDA        RADIOLOGY & ADDITIONAL STUDIES:

## 2025-02-23 NOTE — PROGRESS NOTE ADULT - NS ATTEND AMEND GEN_ALL_CORE FT
74-year-old man with a past medical history of CVA with residual hemiplegia COPD, ESRD on dialysis, chronic hypoxic hypercapnic respiratory failure tracheostomy and vent dependent, PEG in place, pressure ulcer was sent to the ED for leukocytosis.  Patient initially presented to the Cath Lab for fistulogram with vascular surgery.  He was found to have a white count and sent to the emergency room.  Infectious workup suspicious for UTI vs pneumonia. Currently on antibiotics.  Recent events include new brachial DVT with episode of hypoxia and tachycardia on HD, with concern for PE.    This AM he dropped his HB, without overt signs of bleeding.  Heparin was stopped, we are monitoring for bleeding.  EPO dose was not given with 4th dialysis.  Traditionally he receives EPO with every dose.   CTPA is pending.   Being treated for  pseudomonas, e. Faecium in urine culture and on Vanco.  Hyperglycemic, now on NPH.    If Hb remains stable and there is no bleeding will resume heparin.    Also C Auris positive and now on isolation.   Agree with detailed plan as outlined above.

## 2025-02-23 NOTE — PROGRESS NOTE ADULT - ASSESSMENT
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 Patent recently admitted in 6/2024 for left pontine and cerebellar CVA requiring tracheostomy. While at [facility] patient decannulated and passed SS with advanced diet to easy to chew with thin liquids, but complicated COVID requiring transfer to [facility] in 7/2024 and then ultimately discharged home. Per wife patient was doing well at home until 10/2024 when he was found with RLL with concern for aspiration requiring intubation, then tracheostomy, and ultimately discharged to NH with vent dependence in 11/2024. Patient now presented for RUE fistulogram and angioplasty with vascular, however course complicated by leukocytosis with concern for recurrent trachitis vs PNA and UTI, AOCD and hyperglycemia. Admitted to RCU for further management. Found with  PSA trachitis/ PNA and enterococcus faecium UTI. Course complicated by RUE brachial DVT and hypoxia with HD with concern for volume down vs PE?      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   - Last CTH in 10/2024 with no acute findings   - Hold [facility] CT for now   - Monitor mentation     CARDIOVASCULAR  # Hx of HTN and HLD  - Continue on hydral 25 and norvasc 10   - Monitor BP     # Incidental Pericardial effusion   - Incidental small pericardial effusion seen adjacent to right ventricle, however IVC appears flat and LE without edema with low concern for RV failure.   - Prior TTE reviewed from 4/2024 with normal RVLVSF with no pericardial effusion noted at the time.   - BP currently stable   - Pending RPT ECHO   - Monitor hemodynamics     RESPIRATORY  # Respiratory failure second to PNA vs volume down vs PE?   - Hx of COPD with chronic respiratory failure with tracheostomy and vent dependence  - Admitted for angioplasty of RUE AVF, however course complicated by leukocytosis with concern for recurrent trachitis/ PNA.   - Course complicated by hypoxia during HD likely volume down vs migration of RUE brachial DVT with PE?   - Held volume removal, bolus given, and hyopxia improved.   - Pending CTA CHEST (scheduled for Monday preHD).  - Pending ECHO as above  - Continue on heparin GTT for now  - Continue on vent 15/400/50  - Hold PS for now  - Continue on nebs     HEENT   # Hemoptysis   - Wife reported hemoptysis while at nursing home 2 weeks prior to admission   - No hemoptysis noted on admission   - Pending CT CHEST as above   - Monitor for now    GI  # Dysphagia with PEG   - Passed SS with advanced diet to easy to chew with thin liquids in 7/2024, however since recurrent aspiration in 10/2024 now with PEG/ vent dependence   - Continue on PEG TF   - Monitor BMs    RENAL  # ESRD on HD MTTHF with AVF trouble   - Presented for RUE fistulogram and angioplasty with vascular on 2/18   - Resumed on HD with no flow issue from AVF   - Course complicated by hypoxia with concern for volume down given small IVC and elevated lactate on 2/21  - Monitor volume status and continue on HD MTTF per renal   - Monitor renal function, electrolytes and UOP     # Hyponatremia likely volume down   - Concern for volume issues, however overall appears volume down with serum osmo 307  - Sodium 128 and improved with HD/ bolus during HD.   - Trend sodium with HD for now     # Elevated lactate   - Lactate elevated likely second to volume down?   - Post bolus lactate trending down from 3.3 to 2.7   - Hold IVF given anuric and reassess volume removal on Monday pending ECHO as above   - Monitor lactate    INFECTIOUS DISEASE  # Trachitis vs PNA/ UTI   - Hx of steno and PSA in sputum   - Flu/ COVID negative   - MRSA negative   - UCx with enterococcus   - SCx with  PSA   - Found with leukocytosis and started on cefepime and christiano (2/19) and vanco on HD days (2/21).   - No further steno seen in sputum and continue on cefepime (2/19 - ) and vanco on HD days (2/21, 2/22 - ) for now   - WBC increased likely reactive to hypoxic event and now improving  - ID following     # PPX   - Candida PCR pending   - MRSA PCR negative    HEME  # AOCD   - Presented for angioplasty and found with anemia to 6.7 s/p PRBC 2/19   - Anemia panel with concern for AOCD   - No overt signs of bleeding   - Continue on EPO and Fe   - Monitor HH     VASCULAR  # RUE DVT   - RUE edema noted with age-indeterminate, non-occlusive deep vein thrombosis noted in the right brachial vein.   - Case discussed with vascular who recommends full AC   - CTH from prior with encephalomalcia, however no hx of intracranial bleed   - Prior UGIB while on AC, however discharged on eliquis in 7/2024 and completed 6 month course for RLE DVT   - Continue on heparin GTT with patient specific PTT 60-80 given GIB hx.   - Case discussed with Wife who is apprehensive to full AC given bleeding hx, however she wishes for further investigation of pulmonary embolism and if positive she is amendable to risk of full anticoagulation. IF no PE found wife would like to monitor off of anticoagulation despite the risk of further peripheral DVTs.   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 Patent recently admitted in 6/2024 for left pontine and cerebellar CVA requiring tracheostomy. While at [facility] patient decannulated and passed SS with advanced diet to easy to chew with thin liquids, but complicated COVID requiring transfer to [facility] in 7/2024 and then ultimately discharged home. Per wife patient was doing well at home until 10/2024 when he was found with RLL with concern for aspiration requiring intubation, then tracheostomy, and ultimately discharged to NH with vent dependence in 11/2024. Patient now presented for RUE fistulogram and angioplasty with vascular, however course complicated by leukocytosis with concern for recurrent trachitis vs PNA and UTI, AOCD and hyperglycemia. Admitted to RCU for further management. Found with  PSA trachitis/ PNA and enterococcus faecium UTI. Course complicated by RUE brachial DVT and hypoxia with HD with concern for volume down vs PE?      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   - Last CTH in 10/2024 with no acute findings   - Hold [facility] CT for now   - Monitor mentation     CARDIOVASCULAR  # Hx of HTN and HLD  - Continue on hydral 25 and norvasc 10   - Monitor BP     # Incidental Pericardial effusion   - Incidental small pericardial effusion seen adjacent to right ventricle, however IVC appears flat and LE without edema with low concern for RV failure.   - Prior TTE reviewed from 4/2024 with normal RVLVSF with no pericardial effusion noted at the time.   - TTE 2/23 with EF 65, normal LVRVSF, mild LAD, normal RA, mild pulmonary hypertension, and small pericardial effusion noted adjacent to the anterior right ventricle with no echocardiographic evidence of tamponade  - Monitor hemodynamics     RESPIRATORY  # Respiratory failure second to PNA vs volume down vs PE?   - Hx of COPD with chronic respiratory failure with tracheostomy and vent dependence  - Admitted for angioplasty of RUE AVF, however course complicated by leukocytosis with concern for recurrent trachitis/ PNA.   - Course complicated by hypoxia during HD likely volume down vs migration of RUE brachial DVT with PE?   - Held volume removal, bolus given, and hyopxia improved.   - Continue on heparin GTT for now, however stopped second to anemia.   - IF no true bleeding then will resume and pending CTA CHEST (scheduled for Monday preHD).  - Continue on vent 15/400/50  - Hold PS for now  - Continue on nebs     HEENT   # Hemoptysis   - Wife reported hemoptysis while at nursing home 2 weeks prior to admission   - No hemoptysis noted on admission   - Pending CT CHEST as above   - Monitor for now    GI  # Dysphagia with PEG   - Passed SS with advanced diet to easy to chew with thin liquids in 7/2024, however since recurrent aspiration in 10/2024 now with PEG/ vent dependence   - Continue on PEG TF   - Monitor BMs    RENAL  # ESRD on HD MTTHF   # AVF trouble  # Dehydration   - Presented for RUE fistulogram and angioplasty with vascular on 2/18   - Resumed on HD with no flow issue from AVF   - Course complicated by hypoxia with concern for volume down given small IVC and elevated lactate on 2/21  - Volume removal held, lactate improved, and hypoxia improved.   - Monitor volume status and continue on HD MTTF per renal   - Monitor renal function, electrolytes and UOP     # Hyponatremia likely volume down   - Concern for volume issues, however overall appears volume down with serum osmo 307  - Sodium 128 and improved with HD/ bolus during HD.   - Trend sodium with HD for now     # Elevated lactate   - Lactate elevated likely second to volume down?   - Post bolus lactate trending down from 3.3 to 2.7 to 1.3    INFECTIOUS DISEASE  # Trachitis vs PNA/ UTI   - Hx of steno and PSA in sputum   - Flu/ COVID negative   - MRSA negative   - UCx with enterococcus   - SCx with  PSA   - Found with leukocytosis and started on cefepime and christiano (2/19) and vanco on HD days (2/21).   - No further steno seen in sputum and continue on cefepime (2/19 - ) and vanco on HD days (2/21, 2/22 - ) for now   - WBC increased likely reactive to hypoxic event and now improving  - ID following     # PPX   - Candida PCR pending   - MRSA PCR negative    HEME  # AOCD   - Presented for angioplasty and found with anemia to 6.7 s/p 1U PRBC 2/19   - Anemia panel with concern for AOCD   - Recurrent anemia while on heparin GTT, no overt signs of bleeding, and s/p 1/2U PRBC on 2/23  - Continue on EPO QIW and Fe   - Monitor HH     VASCULAR  # RUE DVT   - RUE edema noted with age-indeterminate, non-occlusive deep vein thrombosis noted in the right brachial vein.   - Case discussed with vascular who recommends full AC   - CTH from prior with encephalomalcia, however no hx of intracranial bleed   - Prior UGIB while on AC, however discharged on eliquis in 7/2024 and completed 6 month course for RLE DVT   - Continue on heparin GTT, however held second to anemia with overt bleeding.   - Case discussed with Wife who is apprehensive to full AC given bleeding hx, however she wishes for further investigation of pulmonary embolism and if positive she is amendable to risk of full anticoagulation. IF no PE found wife would like discuss further and possibly monitor off of anticoagulation.    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 Patent recently admitted in 6/2024 for left pontine and cerebellar CVA requiring tracheostomy. While at [facility] patient decannulated and passed SS with advanced diet to easy to chew with thin liquids, but complicated COVID requiring transfer to [facility] in 7/2024 and then ultimately discharged home. Per wife patient was doing well at home until 10/2024 when he was found with RLL with concern for aspiration requiring intubation, then tracheostomy, and ultimately discharged to NH with vent dependence in 11/2024. Patient now presented for RUE fistulogram and angioplasty with vascular, however course complicated by leukocytosis with concern for recurrent trachitis vs PNA and UTI, AOCD and hyperglycemia. Admitted to RCU for further management. Found with  PSA trachitis/ PNA and enterococcus faecium UTI. Course complicated by RUE brachial DVT and hypoxia with HD with concern for volume down vs PE?      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   - Last CTH in 10/2024 with no acute findings   - Hold [facility] CT for now   - Monitor mentation     CARDIOVASCULAR  # Hx of HTN and HLD  - Continue on hydral 25 and norvasc 10   - Monitor BP     # Incidental Pericardial effusion   - Incidental small pericardial effusion seen adjacent to right ventricle, however IVC appears flat and LE without edema with low concern for RV failure.   - Prior TTE reviewed from 4/2024 with normal RVLVSF with no pericardial effusion noted at the time.   - TTE 2/23 with EF 65, normal LVRVSF, mild LAD, normal RA, mild pulmonary hypertension, and small pericardial effusion noted adjacent to the anterior right ventricle with no echocardiographic evidence of tamponade  - Monitor hemodynamics     RESPIRATORY  # Respiratory failure second to PNA vs volume down vs PE?   - Hx of COPD with chronic respiratory failure with tracheostomy and vent dependence  - Admitted for angioplasty of RUE AVF, however course complicated by leukocytosis with concern for recurrent trachitis/ PNA.   - Course complicated by hypoxia during HD likely volume down vs migration of RUE brachial DVT with PE?   - Held volume removal, bolus given, and hyopxia improved.   - Continue on heparin GTT for now, however stopped second to anemia.   - IF no true bleeding then will resume and pending CTA CHEST (scheduled for Monday preHD).  - Continue on vent 15/400/50  - Hold PS for now  - Continue on nebs     HEENT   # Hemoptysis   - Wife reported hemoptysis while at nursing home 2 weeks prior to admission   - No hemoptysis noted on admission   - Pending CT CHEST as above   - Monitor for now    GI  # Dysphagia with PEG   - Passed SS with advanced diet to easy to chew with thin liquids in 7/2024, however since recurrent aspiration in 10/2024 now with PEG/ vent dependence   - Continue on PEG TF   - Monitor BMs    RENAL  # ESRD on HD MTTHF   # AVF trouble  # Dehydration   - Presented for RUE fistulogram and angioplasty with vascular on 2/18   - Resumed on HD with no flow issue from AVF   - Course complicated by hypoxia with concern for volume down given small IVC and elevated lactate on 2/21  - Volume removal held, lactate improved, and hypoxia improved.   - Monitor volume status and continue on HD MTTF per renal   - Monitor renal function, electrolytes and UOP     # Hyponatremia likely volume down   - Concern for volume issues, however overall appears volume down with serum osmo 307  - Sodium 128 and improved with HD/ bolus during HD.   - Trend sodium with HD for now     # Elevated lactate   - Lactate elevated likely second to volume down?   - Post bolus lactate trending down from 3.3 to 2.7 to 1.3    INFECTIOUS DISEASE  # Trachitis vs PNA/ UTI   - Hx of steno and PSA in sputum   - Flu/ COVID negative   - MRSA negative   - UCx with enterococcus   - SCx with  PSA   - Found with leukocytosis and started on cefepime and christiano (2/19) and vanco on HD days (2/21).   - No further steno seen in sputum and continue on cefepime (2/19 - ) and vanco on HD days (2/21, 2/22 - ) for now   - WBC increased likely reactive to hypoxic event and now improving  - ID following     # PPX   - MRSA PCR negative  - Candida auris PCR POSITIVE  - Continue on isolation precautions per IC    HEME  # AOCD   - Presented for angioplasty and found with anemia to 6.7 s/p 1U PRBC 2/19   - Anemia panel with concern for AOCD   - Recurrent anemia while on heparin GTT, no overt signs of bleeding, and s/p 1/2U PRBC on 2/23  - Continue on EPO QIW and Fe   - Monitor HH     VASCULAR  # RUE DVT   - RUE edema noted with age-indeterminate, non-occlusive deep vein thrombosis noted in the right brachial vein.   - Case discussed with vascular who recommends full AC   - CTH from prior with encephalomalcia, however no hx of intracranial bleed   - Prior UGIB while on AC, however discharged on eliquis in 7/2024 and completed 6 month course for RLE DVT   - Continue on heparin GTT, however held second to anemia with overt bleeding.   - Case discussed with Wife who is apprehensive to full AC given bleeding hx, however she wishes for further investigation of pulmonary embolism and if positive she is amendable to risk of full anticoagulation. IF no PE found wife would like discuss further and possibly monitor off of anticoagulation.    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 Patent recently admitted in 6/2024 for left pontine and cerebellar CVA requiring tracheostomy. While at [facility] patient decannulated and passed SS with advanced diet to easy to chew with thin liquids, but complicated COVID requiring transfer to [facility] in 7/2024 and then ultimately discharged home. Per wife patient was doing well at home until 10/2024 when he was found with RLL with concern for aspiration requiring intubation, then tracheostomy, and ultimately discharged to NH with vent dependence in 11/2024. Patient now presented for RUE fistulogram and angioplasty with vascular, however course complicated by leukocytosis with concern for recurrent trachitis vs PNA and UTI, AOCD and hyperglycemia. Admitted to RCU for further management. Found with  PSA trachitis/ PNA and enterococcus faecium UTI. Course complicated by RUE brachial DVT and hypoxia with HD with concern for volume down vs PE?      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   - Last CTH in 10/2024 with no acute findings   - Hold [facility] CT for now   - Monitor mentation     CARDIOVASCULAR  # Hx of HTN and HLD  - Continue on hydral 25 and norvasc 10   - Monitor BP     # Incidental Pericardial effusion   - Incidental small pericardial effusion seen adjacent to right ventricle, however IVC appears flat and LE without edema with low concern for RV failure.   - Prior TTE reviewed from 4/2024 with normal RVLVSF with no pericardial effusion noted at the time.   - TTE 2/23 with EF 65, normal LVRVSF, mild LAD, normal RA, mild pulmonary hypertension, and small pericardial effusion noted adjacent to the anterior right ventricle with no echocardiographic evidence of tamponade  - Monitor hemodynamics     RESPIRATORY  # Respiratory failure second to PNA vs volume down vs PE?   - Hx of COPD with chronic respiratory failure with tracheostomy and vent dependence  - Admitted for angioplasty of RUE AVF, however course complicated by leukocytosis with concern for recurrent trachitis/ PNA.   - Course complicated by hypoxia during HD likely volume down vs migration of RUE brachial DVT with PE?   - Held volume removal, bolus given, and hyopxia improved.   - Continue on heparin GTT for now  - Pending CTA CHEST (scheduled for Monday preHD).  - Continue on vent 15/400/50  - Hold PS for now  - Continue on nebs     HEENT   # Hemoptysis   - Wife reported hemoptysis while at nursing home 2 weeks prior to admission   - No hemoptysis noted on admission   - Pending CT CHEST as above   - Monitor for now    GI  # Dysphagia with PEG   - Passed SS with advanced diet to easy to chew with thin liquids in 7/2024, however since recurrent aspiration in 10/2024 now with PEG/ vent dependence   - Continue on PEG TF   - Monitor BMs    RENAL  # ESRD on HD MTTHF   # AVF trouble  # Dehydration   - Presented for RUE fistulogram and angioplasty with vascular on 2/18   - Resumed on HD with no flow issue from AVF   - Course complicated by hypoxia with concern for volume down given small IVC and elevated lactate on 2/21  - Volume removal held, lactate improved, and hypoxia improved.   - Monitor volume status and continue on HD MTTF per renal   - Monitor renal function, electrolytes and UOP     # Hyponatremia likely volume down   - Concern for volume issues, however overall appears volume down with serum osmo 307  - Sodium 128 and improved with HD/ bolus during HD.   - Trend sodium with HD for now     # Elevated lactate   - Lactate elevated likely second to volume down?   - Post bolus lactate trending down from 3.3 to 2.7 to 1.3    INFECTIOUS DISEASE  # Trachitis vs PNA/ UTI   - Hx of steno and PSA in sputum   - Flu/ COVID negative   - MRSA negative   - UCx with enterococcus   - SCx with  PSA   - Found with leukocytosis and started on cefepime and christiano (2/19) and vanco on HD days (2/21).   - No further steno seen in sputum and continue on cefepime (2/19 - ) and vanco on HD days (2/21, 2/22 - ) for now   - WBC increased likely reactive to hypoxic event and now improving  - ID following     # PPX   - MRSA PCR negative  - Candida auris PCR POSITIVE  - Continue on isolation precautions per IC    HEME  # AOCD   - Presented for angioplasty and found with anemia to 6.7 s/p 1U PRBC 2/19   - Anemia panel with concern for AOCD   - Recurrent anemia while on heparin GTT, however no overt signs of bleeding (gastric lavage clear and stool brown) and PTT never therapeutic.   - s/p 1/2U PRBC on 2/23 and monitor HH   - Continue on EPO QIW and Fe     # Heparin resistance?   - PTT persistently low despite increasing heparin GTT   - Resume on heparin GTT and check anti-Xa levels with PTT     VASCULAR  # RUE DVT   - RUE edema noted with age-indeterminate, non-occlusive deep vein thrombosis noted in the right brachial vein.   - Case discussed with vascular who recommends full AC   - CTH from prior with encephalomalcia, however no hx of intracranial bleed   - Prior UGIB while on AC, however discharged on eliquis in 7/2024 and completed 6 month course for RLE DVT   - Continue on heparin GTT for now   - Case discussed with Wife who is apprehensive to full AC given bleeding hx, however she wishes for further investigation of pulmonary embolism and if positive she is amendable to risk of full anticoagulation. IF no PE found wife would like discuss further and possibly monitor off of anticoagulation.    ENDOCRINE  # DM2 A1C 14.0 (1/2024) > 6.4 (2/2025)  - Presented with hyperglycemia and continued on lantus and ISS   - Increase lantus to 28U QHS and ISS, however FS continues to trend in 200s   - Adjust insulin to NPH 16U Q6H (1200, 1800 and 0000) with tube feeds (1103-3036)    - Monitor FS    ETHICS/ GOC    - FULL CODE     DISPO - Back to NH when able

## 2025-02-23 NOTE — PROGRESS NOTE ADULT - SUBJECTIVE AND OBJECTIVE BOX
[facility] – [place] [facility]   MD ELIANE BHAGAT MD RUORU WONG, PA    TEL:  FROM 9 AM to 5 PM ---OFFICE: [phone]  AVAILABLE ON TEAMS    FROM 5 PM - 9 AM PLEASE CALL ANSWERING SERVICE: [phone]    RENAL FOLLOW UP NOTE--Date of Service 02-23-25 @ 10:03  --------------------------------------------------------------------------------  HPI:      Pt seen and examined at bedside.       PAST HISTORY  --------------------------------------------------------------------------------  No significant changes to PMH, PSH, FHx, SHx, unless otherwise noted    ALLERGIES & MEDICATIONS  --------------------------------------------------------------------------------  Allergies    No Known Allergies    Intolerances      Standing Inpatient Medications  albuterol/ipratropium for Nebulization 3 milliLiter(s) Nebulizer every 6 hours  amLODIPine   Tablet 10 milliGRAM(s) Oral daily  atorvastatin 20 milliGRAM(s) Oral at bedtime  cefepime   IVPB 1000 milliGRAM(s) IV Intermittent every 24 hours  chlorhexidine 0.12% Liquid 15 milliLiter(s) Oral Mucosa every 12 hours  chlorhexidine 2% Cloths 1 Application(s) Topical daily  dextrose 5%. 1000 milliLiter(s) IV Continuous <Continuous>  dextrose 5%. 1000 milliLiter(s) IV Continuous <Continuous>  dextrose 50% Injectable 25 Gram(s) IV Push once  dextrose 50% Injectable 12.5 Gram(s) IV Push once  dextrose 50% Injectable 25 Gram(s) IV Push once  epoetin reilly-epbx (RETACRIT) Injectable 03913 Unit(s) IV Push <User Schedule>  ferrous    sulfate Liquid 300 milliGRAM(s) Enteral Tube daily  glucagon  Injectable 1 milliGRAM(s) IntraMuscular once  hydrALAZINE 25 milliGRAM(s) Oral three times a day  insulin lispro (ADMELOG) corrective regimen sliding scale   SubCutaneous every 6 hours  insulin NPH human recombinant 12 Unit(s) SubCutaneous <User Schedule>  Nephro-noam 1 Tablet(s) Oral daily  pantoprazole   Suspension 40 milliGRAM(s) Oral before breakfast    PRN Inpatient Medications  dextrose Oral Gel 15 Gram(s) Oral once PRN  sodium chloride 0.9% Bolus. 100 milliLiter(s) IV Bolus every 5 minutes PRN      REVIEW OF SYSTEMS  --------------------------------------------------------------------------------  General: no fever  MSK: no edema     VITALS/PHYSICAL EXAM  --------------------------------------------------------------------------------  T(C): 37.1 (02-23-25 @ 08:40), Max: 37.1 (02-23-25 @ 00:00)  HR: 77 (02-23-25 @ 08:40) (70 - 86)  BP: 126/60 (02-23-25 @ 08:40) (120/52 - 159/68)  RR: 16 (02-23-25 @ 08:40) (14 - 17)  SpO2: 100% (02-23-25 @ 08:40) (100% - 100%)  Wt(kg): --        02-22-25 @ 07:01  -  02-23-25 @ 07:00  --------------------------------------------------------  IN: 1240 mL / OUT: 50 mL / NET: 1190 mL      Physical Exam:  	General: Nonverbal, eyes open  Neck: +trach  Respiratory: CTAB, no wheezes, rales or rhonchi  Cardiovascular: S1, S2, RRR  Gastrointestinal: +peg  Extremities: No peripheral edema  LABS/STUDIES  --------------------------------------------------------------------------------              6.5    15.39 >-----------<  208      [02-23-25 @ 06:30]              20.8     129  |  90  |  62  ----------------------------<  175      [02-23-25 @ 05:20]  3.5   |  24  |  2.49        Ca     8.6     [02-23-25 @ 05:20]      Mg     3.00     [02-23-25 @ 05:20]      Phos  4.3     [02-23-25 @ 05:20]      PT/INR: PT 12.4 , INR 1.04       [02-22-25 @ 10:07]  PTT: 30.6       [02-23-25 @ 05:20]    Serum Osmolality 307      [02-22-25 @ 10:07]    Creatinine Trend:  SCr 2.49 [02-23 @ 05:20]  SCr 1.65 [02-22 @ 05:30]  SCr 1.39 [02-21 @ 21:21]  SCr 2.88 [02-21 @ 04:50]  SCr 2.16 [02-20 @ 06:00]    Urinalysis - [02-23-25 @ 05:20]      Color  / Appearance  / SG  / pH       Gluc 175 / Ketone   / Bili  / Urobili        Blood  / Protein  / Leuk Est  / Nitrite       RBC  / WBC  / Hyaline  / Gran  / Sq Epi  / Non Sq Epi  / Bacteria       Iron 46, TIBC 142, %sat 32      [02-19-25 @ 11:39]  Ferritin 3601      [02-19-25 @ 11:39]  PTH -- (Ca --)      [05-26-24 @ 01:20]   122  TSH 1.660      [10-05-24 @ 08:37]  Lipid: chol --, , HDL --, LDL --      [10-18-24 @ 06:00]    HBsAb 654.8      [02-19-25 @ 19:05]  HBsAg Nonreact      [02-19-25 @ 19:05]  HBcAb Nonreact      [02-19-25 @ 19:05]  HCV 0.19, Nonreact      [02-19-25 @ 19:05]

## 2025-02-23 NOTE — PROGRESS NOTE ADULT - SUBJECTIVE AND OBJECTIVE BOX
RCU PROGRESS NOTE     CHIEF COMPLAINT: Patient is a 74y old  Male who presents with a chief complaint of leukocytosis (20 Feb 2025 13:41)      INTERVAL EVENTS:  - HH dropped overnight but no bleeding likely second to baseline AOCD/ ESRD.   - Will give 1/2U PRBC today   - Will increase EPO to MTTS per HD schedule   - Heparin GTT on hold and pending CTA CHEST and TTE  - WBC improved   - Sodium falling    REVIEW OF SYSTEMS: Seen by bedside during AM rounds and unable to assess ROS second to vented     Mode: AC/ CMV (Assist Control/ Continuous Mandatory Ventilation), RR (machine): 15, TV (machine): 400, FiO2: 40, PEEP: 5, MAP: 8, PIP: 24      OBJECTIVE:  ICU Vital Signs Last 24 Hrs  T(C): 36.4 (21 Feb 2025 05:40), Max: 36.9 (20 Feb 2025 20:00)  T(F): 97.6 (21 Feb 2025 05:40), Max: 98.5 (20 Feb 2025 20:00)  HR: 71 (21 Feb 2025 07:44) (64 - 84)  BP: 153/66 (21 Feb 2025 05:40) (138/66 - 171/72)  BP(mean): 91 (20 Feb 2025 16:00) (91 - 100)  ABP: --  ABP(mean): --  RR: 16 (21 Feb 2025 05:40) (14 - 18)  SpO2: 99% (21 Feb 2025 07:44) (97% - 100%)    O2 Parameters below as of 21 Feb 2025 05:40  Patient On (Oxygen Delivery Method): ventilator    O2 Concentration (%): 40      Mode: AC/ CMV (Assist Control/ Continuous Mandatory Ventilation), RR (machine): 15, TV (machine): 400, FiO2: 40, PEEP: 5, MAP: 8, PIP: 24    02-20 @ 07:01  -  02-21 @ 07:00  --------------------------------------------------------  IN: 780 mL / OUT: 0 mL / NET: 780 mL      CAPILLARY BLOOD GLUCOSE  POCT Blood Glucose.: 291 mg/dL (21 Feb 2025 05:35)      HOSPITAL MEDICATIONS:  MEDICATIONS  (STANDING):  amLODIPine   Tablet 10 milliGRAM(s) Oral daily  atorvastatin 20 milliGRAM(s) Oral at bedtime  cefepime   IVPB 1000 milliGRAM(s) IV Intermittent every 24 hours  chlorhexidine 0.12% Liquid 15 milliLiter(s) Oral Mucosa every 12 hours  chlorhexidine 2% Cloths 1 Application(s) Topical daily  dextrose 5%. 1000 milliLiter(s) (100 mL/Hr) IV Continuous <Continuous>  dextrose 5%. 1000 milliLiter(s) (50 mL/Hr) IV Continuous <Continuous>  dextrose 50% Injectable 25 Gram(s) IV Push once  dextrose 50% Injectable 12.5 Gram(s) IV Push once  dextrose 50% Injectable 25 Gram(s) IV Push once  epoetin reilly-epbx (RETACRIT) Injectable 95046 Unit(s) IV Push <User Schedule>  ferrous    sulfate Liquid 300 milliGRAM(s) Enteral Tube daily  glucagon  Injectable 1 milliGRAM(s) IntraMuscular once  hydrALAZINE 25 milliGRAM(s) Oral three times a day  insulin glargine Injectable (LANTUS) 28 Unit(s) SubCutaneous at bedtime  insulin lispro (ADMELOG) corrective regimen sliding scale   SubCutaneous every 6 hours  minocycline IVPB 200 milliGRAM(s) IV Intermittent every 12 hours  Nephro-noam 1 Tablet(s) Oral daily  pantoprazole   Suspension 40 milliGRAM(s) Oral before breakfast  potassium phosphate / sodium phosphate Tablet (K-PHOS No. 2) 1 Tablet(s) Oral once  vancomycin  IVPB 1000 milliGRAM(s) IV Intermittent once    MEDICATIONS  (PRN):  dextrose Oral Gel 15 Gram(s) Oral once PRN Blood Glucose LESS THAN 70 milliGRAM(s)/deciliter  sodium chloride 0.9% Bolus. 100 milliLiter(s) IV Bolus every 5 minutes PRN SBP LESS THAN or EQUAL to 80 mmHg      PHYSICAL EXAMINATION  General: NAD and severely cachetic   HEENT: Trach present  Cards: S1/S2, no murmurs   Pulm: Course vent sounds bilaterally. No wheezes.   Abdomen: Soft, nondistended and nontender. PEG present.   Extremities: No pedal edema. RUE edema noted. RUE forearm AVF with strong thrill. THAI of BL upper and lower extremities.  Neurology: Awakens with eyes open, able to follow commands on left (LUE>LLE), however noted with R hemiplegia per baseline. Overall severe weakness noted. No acute focal neurological deficits     LABS:                                                                  6.5    15.39 )-----------( 208      ( 23 Feb 2025 06:30 )             20.8       02-23    129[L]  |  90[L]  |  62[H]  ----------------------------<  175[H]  3.5   |  24  |  2.49[H]    Ca    8.6      23 Feb 2025 05:20  Phos  4.3     02-23  Mg     3.00     02-23              Urinalysis Basic - ( 21 Feb 2025 04:50 )  Color: x / Appearance: x / SG: x / pH: x  Gluc: 259 mg/dL / Ketone: x  / Bili: x / Urobili: x   Blood: x / Protein: x / Nitrite: x   Leuk Esterase: x / RBC: x / WBC x   Sq Epi: x / Non Sq Epi: x / Bacteria: x            PAST MEDICAL & SURGICAL HISTORY:  ESRD on hemodialysis      HTN (hypertension)      Insulin dependent type 2 diabetes mellitus      History of cerebrovascular accident (CVA) with residual deficit      History of DVT of lower extremity      HLD (hyperlipidemia)      CVA (cerebrovascular accident)      Aphasia      Tracheostomy in place      PEG (percutaneous endoscopic gastrostomy) status      GERD (gastroesophageal reflux disease)      Constipation      Pressure ulcer      Arteriovenous fistula      S/P percutaneous endoscopic gastrostomy (PEG) tube placement      History of tracheostomy          FAMILY HISTORY:  FHx: diabetes mellitus (Father, Aunt)        Social History:      RADIOLOGY:  [ ] Reviewed and interpreted by me    PULMONARY FUNCTION TESTS:    EKG: RCU PROGRESS NOTE     CHIEF COMPLAINT: Patient is a 74y old  Male who presents with a chief complaint of leukocytosis (20 Feb 2025 13:41)      INTERVAL EVENTS:  - HH dropped overnight but no bleeding and more likely second to baseline AOCD/ ESRD.   - Will give 1/2U PRBC today   - Will increase EPO to MTTS per HD schedule   - Heparin GTT on hold, however if no true bleeding will resume and monitor.   - Pending CTA CHEST and TTE  - WBC improved   - Sodium falling    REVIEW OF SYSTEMS: Seen by bedside during AM rounds and unable to assess ROS second to vented     Mode: AC/ CMV (Assist Control/ Continuous Mandatory Ventilation), RR (machine): 15, TV (machine): 400, FiO2: 40, PEEP: 5, MAP: 8, PIP: 24      OBJECTIVE:  ICU Vital Signs Last 24 Hrs  T(C): 36.4 (21 Feb 2025 05:40), Max: 36.9 (20 Feb 2025 20:00)  T(F): 97.6 (21 Feb 2025 05:40), Max: 98.5 (20 Feb 2025 20:00)  HR: 71 (21 Feb 2025 07:44) (64 - 84)  BP: 153/66 (21 Feb 2025 05:40) (138/66 - 171/72)  BP(mean): 91 (20 Feb 2025 16:00) (91 - 100)  ABP: --  ABP(mean): --  RR: 16 (21 Feb 2025 05:40) (14 - 18)  SpO2: 99% (21 Feb 2025 07:44) (97% - 100%)    O2 Parameters below as of 21 Feb 2025 05:40  Patient On (Oxygen Delivery Method): ventilator    O2 Concentration (%): 40      Mode: AC/ CMV (Assist Control/ Continuous Mandatory Ventilation), RR (machine): 15, TV (machine): 400, FiO2: 40, PEEP: 5, MAP: 8, PIP: 24    02-20 @ 07:01  -  02-21 @ 07:00  --------------------------------------------------------  IN: 780 mL / OUT: 0 mL / NET: 780 mL      CAPILLARY BLOOD GLUCOSE  POCT Blood Glucose.: 291 mg/dL (21 Feb 2025 05:35)      HOSPITAL MEDICATIONS:  MEDICATIONS  (STANDING):  amLODIPine   Tablet 10 milliGRAM(s) Oral daily  atorvastatin 20 milliGRAM(s) Oral at bedtime  cefepime   IVPB 1000 milliGRAM(s) IV Intermittent every 24 hours  chlorhexidine 0.12% Liquid 15 milliLiter(s) Oral Mucosa every 12 hours  chlorhexidine 2% Cloths 1 Application(s) Topical daily  dextrose 5%. 1000 milliLiter(s) (100 mL/Hr) IV Continuous <Continuous>  dextrose 5%. 1000 milliLiter(s) (50 mL/Hr) IV Continuous <Continuous>  dextrose 50% Injectable 25 Gram(s) IV Push once  dextrose 50% Injectable 12.5 Gram(s) IV Push once  dextrose 50% Injectable 25 Gram(s) IV Push once  epoetin reilly-epbx (RETACRIT) Injectable 23013 Unit(s) IV Push <User Schedule>  ferrous    sulfate Liquid 300 milliGRAM(s) Enteral Tube daily  glucagon  Injectable 1 milliGRAM(s) IntraMuscular once  hydrALAZINE 25 milliGRAM(s) Oral three times a day  insulin glargine Injectable (LANTUS) 28 Unit(s) SubCutaneous at bedtime  insulin lispro (ADMELOG) corrective regimen sliding scale   SubCutaneous every 6 hours  minocycline IVPB 200 milliGRAM(s) IV Intermittent every 12 hours  Nephro-noam 1 Tablet(s) Oral daily  pantoprazole   Suspension 40 milliGRAM(s) Oral before breakfast  potassium phosphate / sodium phosphate Tablet (K-PHOS No. 2) 1 Tablet(s) Oral once  vancomycin  IVPB 1000 milliGRAM(s) IV Intermittent once    MEDICATIONS  (PRN):  dextrose Oral Gel 15 Gram(s) Oral once PRN Blood Glucose LESS THAN 70 milliGRAM(s)/deciliter  sodium chloride 0.9% Bolus. 100 milliLiter(s) IV Bolus every 5 minutes PRN SBP LESS THAN or EQUAL to 80 mmHg      PHYSICAL EXAMINATION  General: NAD and severely cachetic   HEENT: Trach present  Cards: S1/S2, no murmurs   Pulm: Course vent sounds bilaterally. No wheezes.   Abdomen: Soft, nondistended and nontender. PEG present.   Extremities: No pedal edema. RUE edema noted. RUE forearm AVF with strong thrill. THAI of BL upper and lower extremities.  Neurology: Awakens with eyes open, able to follow commands on left (LUE>LLE), however noted with R hemiplegia per baseline. Overall severe weakness noted. No acute focal neurological deficits     LABS:                                                                  6.5    15.39 )-----------( 208      ( 23 Feb 2025 06:30 )             20.8       02-23    129[L]  |  90[L]  |  62[H]  ----------------------------<  175[H]  3.5   |  24  |  2.49[H]    Ca    8.6      23 Feb 2025 05:20  Phos  4.3     02-23  Mg     3.00     02-23              Urinalysis Basic - ( 21 Feb 2025 04:50 )  Color: x / Appearance: x / SG: x / pH: x  Gluc: 259 mg/dL / Ketone: x  / Bili: x / Urobili: x   Blood: x / Protein: x / Nitrite: x   Leuk Esterase: x / RBC: x / WBC x   Sq Epi: x / Non Sq Epi: x / Bacteria: x            PAST MEDICAL & SURGICAL HISTORY:  ESRD on hemodialysis      HTN (hypertension)      Insulin dependent type 2 diabetes mellitus      History of cerebrovascular accident (CVA) with residual deficit      History of DVT of lower extremity      HLD (hyperlipidemia)      CVA (cerebrovascular accident)      Aphasia      Tracheostomy in place      PEG (percutaneous endoscopic gastrostomy) status      GERD (gastroesophageal reflux disease)      Constipation      Pressure ulcer      Arteriovenous fistula      S/P percutaneous endoscopic gastrostomy (PEG) tube placement      History of tracheostomy          FAMILY HISTORY:  FHx: diabetes mellitus (Father, Aunt)        Social History:      RADIOLOGY:  [ ] Reviewed and interpreted by me    PULMONARY FUNCTION TESTS:    EKG: RCU PROGRESS NOTE     CHIEF COMPLAINT: Patient is a 74y old  Male who presents with a chief complaint of leukocytosis (20 Feb 2025 13:41)      INTERVAL EVENTS:  - HH dropped overnight but no bleeding and more likely second to baseline AOCD/ ESRD.   - Given 1/2U PRBC today   - Will increase EPO to MTTS per HD schedule   - Heparin GTT held, however gastric lavage with no CGE and stool brown with no true bleeding noted and will resume and monitor.   - Pending CTA CHEST and TTE  - WBC improved   - Sodium falling    REVIEW OF SYSTEMS: Seen by bedside during AM rounds and unable to assess ROS second to vented     Mode: AC/ CMV (Assist Control/ Continuous Mandatory Ventilation), RR (machine): 15, TV (machine): 400, FiO2: 40, PEEP: 5, MAP: 8, PIP: 24      OBJECTIVE:  ICU Vital Signs Last 24 Hrs  T(C): 36.4 (21 Feb 2025 05:40), Max: 36.9 (20 Feb 2025 20:00)  T(F): 97.6 (21 Feb 2025 05:40), Max: 98.5 (20 Feb 2025 20:00)  HR: 71 (21 Feb 2025 07:44) (64 - 84)  BP: 153/66 (21 Feb 2025 05:40) (138/66 - 171/72)  BP(mean): 91 (20 Feb 2025 16:00) (91 - 100)  ABP: --  ABP(mean): --  RR: 16 (21 Feb 2025 05:40) (14 - 18)  SpO2: 99% (21 Feb 2025 07:44) (97% - 100%)    O2 Parameters below as of 21 Feb 2025 05:40  Patient On (Oxygen Delivery Method): ventilator    O2 Concentration (%): 40      Mode: AC/ CMV (Assist Control/ Continuous Mandatory Ventilation), RR (machine): 15, TV (machine): 400, FiO2: 40, PEEP: 5, MAP: 8, PIP: 24    02-20 @ 07:01  -  02-21 @ 07:00  --------------------------------------------------------  IN: 780 mL / OUT: 0 mL / NET: 780 mL      CAPILLARY BLOOD GLUCOSE  POCT Blood Glucose.: 291 mg/dL (21 Feb 2025 05:35)      HOSPITAL MEDICATIONS:  MEDICATIONS  (STANDING):  amLODIPine   Tablet 10 milliGRAM(s) Oral daily  atorvastatin 20 milliGRAM(s) Oral at bedtime  cefepime   IVPB 1000 milliGRAM(s) IV Intermittent every 24 hours  chlorhexidine 0.12% Liquid 15 milliLiter(s) Oral Mucosa every 12 hours  chlorhexidine 2% Cloths 1 Application(s) Topical daily  dextrose 5%. 1000 milliLiter(s) (100 mL/Hr) IV Continuous <Continuous>  dextrose 5%. 1000 milliLiter(s) (50 mL/Hr) IV Continuous <Continuous>  dextrose 50% Injectable 25 Gram(s) IV Push once  dextrose 50% Injectable 12.5 Gram(s) IV Push once  dextrose 50% Injectable 25 Gram(s) IV Push once  epoetin reilly-epbx (RETACRIT) Injectable 75678 Unit(s) IV Push <User Schedule>  ferrous    sulfate Liquid 300 milliGRAM(s) Enteral Tube daily  glucagon  Injectable 1 milliGRAM(s) IntraMuscular once  hydrALAZINE 25 milliGRAM(s) Oral three times a day  insulin glargine Injectable (LANTUS) 28 Unit(s) SubCutaneous at bedtime  insulin lispro (ADMELOG) corrective regimen sliding scale   SubCutaneous every 6 hours  minocycline IVPB 200 milliGRAM(s) IV Intermittent every 12 hours  Nephro-noam 1 Tablet(s) Oral daily  pantoprazole   Suspension 40 milliGRAM(s) Oral before breakfast  potassium phosphate / sodium phosphate Tablet (K-PHOS No. 2) 1 Tablet(s) Oral once  vancomycin  IVPB 1000 milliGRAM(s) IV Intermittent once    MEDICATIONS  (PRN):  dextrose Oral Gel 15 Gram(s) Oral once PRN Blood Glucose LESS THAN 70 milliGRAM(s)/deciliter  sodium chloride 0.9% Bolus. 100 milliLiter(s) IV Bolus every 5 minutes PRN SBP LESS THAN or EQUAL to 80 mmHg      PHYSICAL EXAMINATION  General: NAD and severely cachetic   HEENT: Trach present  Cards: S1/S2, no murmurs   Pulm: Course vent sounds bilaterally. No wheezes.   Abdomen: Soft, nondistended and nontender. PEG present.   Extremities: No pedal edema. RUE edema noted. RUE forearm AVF with strong thrill. THAI of BL upper and lower extremities.  Neurology: Awakens with eyes open, able to follow commands on left (LUE>LLE), however noted with R hemiplegia per baseline. Overall severe weakness noted. No acute focal neurological deficits     LABS:                                                                  6.5    15.39 )-----------( 208      ( 23 Feb 2025 06:30 )             20.8       02-23    129[L]  |  90[L]  |  62[H]  ----------------------------<  175[H]  3.5   |  24  |  2.49[H]    Ca    8.6      23 Feb 2025 05:20  Phos  4.3     02-23  Mg     3.00     02-23              Urinalysis Basic - ( 21 Feb 2025 04:50 )  Color: x / Appearance: x / SG: x / pH: x  Gluc: 259 mg/dL / Ketone: x  / Bili: x / Urobili: x   Blood: x / Protein: x / Nitrite: x   Leuk Esterase: x / RBC: x / WBC x   Sq Epi: x / Non Sq Epi: x / Bacteria: x            PAST MEDICAL & SURGICAL HISTORY:  ESRD on hemodialysis      HTN (hypertension)      Insulin dependent type 2 diabetes mellitus      History of cerebrovascular accident (CVA) with residual deficit      History of DVT of lower extremity      HLD (hyperlipidemia)      CVA (cerebrovascular accident)      Aphasia      Tracheostomy in place      PEG (percutaneous endoscopic gastrostomy) status      GERD (gastroesophageal reflux disease)      Constipation      Pressure ulcer      Arteriovenous fistula      S/P percutaneous endoscopic gastrostomy (PEG) tube placement      History of tracheostomy          FAMILY HISTORY:  FHx: diabetes mellitus (Father, Aunt)        Social History:      RADIOLOGY:  [ ] Reviewed and interpreted by me    PULMONARY FUNCTION TESTS:    EKG:

## 2025-02-24 LAB
ANION GAP SERPL CALC-SCNC: 18 MMOL/L — HIGH (ref 7–14)
BASOPHILS # BLD AUTO: 0.03 K/UL — SIGNIFICANT CHANGE UP (ref 0–0.2)
BASOPHILS NFR BLD AUTO: 0.2 % — SIGNIFICANT CHANGE UP (ref 0–2)
BUN SERPL-MCNC: 76 MG/DL — HIGH (ref 7–23)
CALCIUM SERPL-MCNC: 9 MG/DL — SIGNIFICANT CHANGE UP (ref 8.4–10.5)
CHLORIDE SERPL-SCNC: 82 MMOL/L — LOW (ref 98–107)
CO2 SERPL-SCNC: 24 MMOL/L — SIGNIFICANT CHANGE UP (ref 22–31)
CREAT SERPL-MCNC: 3.1 MG/DL — HIGH (ref 0.5–1.3)
EGFR: 21 ML/MIN/1.73M2 — LOW
EGFR: 21 ML/MIN/1.73M2 — LOW
EOSINOPHIL # BLD AUTO: 0.47 K/UL — SIGNIFICANT CHANGE UP (ref 0–0.5)
EOSINOPHIL NFR BLD AUTO: 3.5 % — SIGNIFICANT CHANGE UP (ref 0–6)
GLUCOSE BLDC GLUCOMTR-MCNC: 188 MG/DL — HIGH (ref 70–99)
GLUCOSE BLDC GLUCOMTR-MCNC: 191 MG/DL — HIGH (ref 70–99)
GLUCOSE SERPL-MCNC: 280 MG/DL — HIGH (ref 70–99)
HCT VFR BLD CALC: 28.2 % — LOW (ref 39–50)
HGB BLD-MCNC: 9.2 G/DL — LOW (ref 13–17)
IMM GRANULOCYTES NFR BLD AUTO: 0.4 % — SIGNIFICANT CHANGE UP (ref 0–0.9)
LYMPHOCYTES # BLD AUTO: 0.8 K/UL — LOW (ref 1–3.3)
LYMPHOCYTES # BLD AUTO: 5.9 % — LOW (ref 13–44)
MAGNESIUM SERPL-MCNC: 3.3 MG/DL — HIGH (ref 1.6–2.6)
MCHC RBC-ENTMCNC: 24.3 PG — LOW (ref 27–34)
MCHC RBC-ENTMCNC: 32.6 G/DL — SIGNIFICANT CHANGE UP (ref 32–36)
MCV RBC AUTO: 74.6 FL — LOW (ref 80–100)
MONOCYTES # BLD AUTO: 0.77 K/UL — SIGNIFICANT CHANGE UP (ref 0–0.9)
MONOCYTES NFR BLD AUTO: 5.7 % — SIGNIFICANT CHANGE UP (ref 2–14)
NEUTROPHILS # BLD AUTO: 11.39 K/UL — HIGH (ref 1.8–7.4)
NEUTROPHILS NFR BLD AUTO: 84.3 % — HIGH (ref 43–77)
NRBC # BLD AUTO: 0.12 K/UL — HIGH (ref 0–0)
NRBC # FLD: 0.12 K/UL — HIGH (ref 0–0)
NRBC BLD AUTO-RTO: 0 /100 WBCS — SIGNIFICANT CHANGE UP (ref 0–0)
PHOSPHATE SERPL-MCNC: 4.6 MG/DL — HIGH (ref 2.5–4.5)
PLATELET # BLD AUTO: 246 K/UL — SIGNIFICANT CHANGE UP (ref 150–400)
POTASSIUM SERPL-MCNC: 4 MMOL/L — SIGNIFICANT CHANGE UP (ref 3.5–5.3)
POTASSIUM SERPL-SCNC: 4 MMOL/L — SIGNIFICANT CHANGE UP (ref 3.5–5.3)
RBC # FLD: 18.4 % — HIGH (ref 10.3–14.5)
SODIUM SERPL-SCNC: 124 MMOL/L — LOW (ref 135–145)
UFH PPP CHRO-ACNC: 0.29 IU/ML — LOW (ref 0.3–0.7)
UFH PPP CHRO-ACNC: >2 IU/ML — CRITICAL HIGH (ref 0.3–0.7)
VANCOMYCIN FLD-MCNC: 14.3 UG/ML — SIGNIFICANT CHANGE UP
WBC # BLD: 13.52 K/UL — HIGH (ref 3.8–10.5)
WBC # FLD AUTO: 13.52 K/UL — HIGH (ref 3.8–10.5)

## 2025-02-24 PROCEDURE — 99233 SBSQ HOSP IP/OBS HIGH 50: CPT | Mod: FS

## 2025-02-24 PROCEDURE — 71275 CT ANGIOGRAPHY CHEST: CPT | Mod: 26

## 2025-02-24 RX ORDER — HEPARIN SODIUM 1000 [USP'U]/ML
1100 INJECTION INTRAVENOUS; SUBCUTANEOUS
Qty: 25000 | Refills: 0 | Status: DISCONTINUED | OUTPATIENT
Start: 2025-02-24 | End: 2025-02-26

## 2025-02-24 RX ORDER — VANCOMYCIN HCL IN 5 % DEXTROSE 1.5G/250ML
750 PLASTIC BAG, INJECTION (ML) INTRAVENOUS ONCE
Refills: 0 | Status: COMPLETED | OUTPATIENT
Start: 2025-02-24 | End: 2025-02-25

## 2025-02-24 RX ORDER — HEPARIN SODIUM 1000 [USP'U]/ML
11 INJECTION INTRAVENOUS; SUBCUTANEOUS
Qty: 25000 | Refills: 0 | Status: DISCONTINUED | OUTPATIENT
Start: 2025-02-24 | End: 2025-02-24

## 2025-02-24 RX ADMIN — Medication 25 MILLIGRAM(S): at 05:56

## 2025-02-24 RX ADMIN — Medication 40 MILLIGRAM(S): at 06:00

## 2025-02-24 RX ADMIN — INSULIN LISPRO 1: 100 INJECTION, SOLUTION INTRAVENOUS; SUBCUTANEOUS at 17:33

## 2025-02-24 RX ADMIN — Medication 25 MILLIGRAM(S): at 14:42

## 2025-02-24 RX ADMIN — Medication 15 MILLILITER(S): at 17:34

## 2025-02-24 RX ADMIN — HEPARIN SODIUM 13 UNIT(S)/HR: 1000 INJECTION INTRAVENOUS; SUBCUTANEOUS at 00:28

## 2025-02-24 RX ADMIN — ATORVASTATIN CALCIUM 20 MILLIGRAM(S): 80 TABLET, FILM COATED ORAL at 21:37

## 2025-02-24 RX ADMIN — IPRATROPIUM BROMIDE AND ALBUTEROL SULFATE 3 MILLILITER(S): .5; 2.5 SOLUTION RESPIRATORY (INHALATION) at 03:54

## 2025-02-24 RX ADMIN — Medication 16 UNIT(S): at 17:33

## 2025-02-24 RX ADMIN — IPRATROPIUM BROMIDE AND ALBUTEROL SULFATE 3 MILLILITER(S): .5; 2.5 SOLUTION RESPIRATORY (INHALATION) at 22:26

## 2025-02-24 RX ADMIN — IPRATROPIUM BROMIDE AND ALBUTEROL SULFATE 3 MILLILITER(S): .5; 2.5 SOLUTION RESPIRATORY (INHALATION) at 07:56

## 2025-02-24 RX ADMIN — EPOETIN ALFA 10000 UNIT(S): 10000 SOLUTION INTRAVENOUS; SUBCUTANEOUS at 21:37

## 2025-02-24 RX ADMIN — HEPARIN SODIUM 11.5 UNIT(S)/HR: 1000 INJECTION INTRAVENOUS; SUBCUTANEOUS at 18:46

## 2025-02-24 RX ADMIN — Medication 1 APPLICATION(S): at 12:21

## 2025-02-24 RX ADMIN — Medication 1 TABLET(S): at 12:20

## 2025-02-24 RX ADMIN — INSULIN LISPRO 1: 100 INJECTION, SOLUTION INTRAVENOUS; SUBCUTANEOUS at 05:52

## 2025-02-24 RX ADMIN — Medication 15 MILLILITER(S): at 05:52

## 2025-02-24 RX ADMIN — INSULIN LISPRO 1: 100 INJECTION, SOLUTION INTRAVENOUS; SUBCUTANEOUS at 00:29

## 2025-02-24 RX ADMIN — Medication 16 UNIT(S): at 12:21

## 2025-02-24 RX ADMIN — Medication 300 MILLIGRAM(S): at 12:20

## 2025-02-24 RX ADMIN — AMLODIPINE BESYLATE 10 MILLIGRAM(S): 10 TABLET ORAL at 05:56

## 2025-02-24 RX ADMIN — HEPARIN SODIUM 11 UNIT(S)/HR: 1000 INJECTION INTRAVENOUS; SUBCUTANEOUS at 12:33

## 2025-02-24 RX ADMIN — Medication 16 UNIT(S): at 00:31

## 2025-02-24 RX ADMIN — IPRATROPIUM BROMIDE AND ALBUTEROL SULFATE 3 MILLILITER(S): .5; 2.5 SOLUTION RESPIRATORY (INHALATION) at 16:25

## 2025-02-24 NOTE — PROGRESS NOTE ADULT - SUBJECTIVE AND OBJECTIVE BOX
[facility] [place] [facility]   MD ELIANE BHAGAT MD MARIA SANTIAGO, NP    TEL:  OFFICE: [phone]  From 5pm-7am Answering Service [phone]    -- RENAL FOLLOW UP NOTE ---Date of Service 02-24-25 @ 16:12    Patient is a 74y old  Male who presents with a chief complaint of leukocytosis       Patient seen and examined at bedside. Patient on ventilator in no acute distress.    VITALS:  T(F): 97.1 (02-24-25 @ 14:38), Max: 97.2 (02-23-25 @ 20:00)  HR: 68 (02-24-25 @ 14:38)  BP: 150/57 (02-24-25 @ 14:38)  RR: 19 (02-24-25 @ 14:38)  SpO2: 100% (02-24-25 @ 14:38)  Wt(kg): --    02-23 @ 07:01  -  02-24 @ 07:00  --------------------------------------------------------  IN: 1380 mL / OUT: 0 mL / NET: 1380 mL          PHYSICAL EXAM:  General: NAD  Neck: No JVD  Respiratory: +rhonchi; +trach  Cardiovascular: S1, S2, RRR  Gastrointestinal: BS+, soft, NT/ND; +peg  Extremities: No peripheral edema    Hospital Medications:   MEDICATIONS  (STANDING):  albuterol/ipratropium for Nebulization 3 milliLiter(s) Nebulizer every 6 hours  amLODIPine   Tablet 10 milliGRAM(s) Oral daily  atorvastatin 20 milliGRAM(s) Oral at bedtime  cefepime   IVPB 1000 milliGRAM(s) IV Intermittent every 24 hours  chlorhexidine 0.12% Liquid 15 milliLiter(s) Oral Mucosa every 12 hours  chlorhexidine 2% Cloths 1 Application(s) Topical daily  dextrose 5%. 1000 milliLiter(s) (100 mL/Hr) IV Continuous <Continuous>  dextrose 5%. 1000 milliLiter(s) (50 mL/Hr) IV Continuous <Continuous>  dextrose 50% Injectable 25 Gram(s) IV Push once  dextrose 50% Injectable 12.5 Gram(s) IV Push once  dextrose 50% Injectable 25 Gram(s) IV Push once  epoetin reilly-epbx (RETACRIT) Injectable 25184 Unit(s) IV Push <User Schedule>  ferrous    sulfate Liquid 300 milliGRAM(s) Enteral Tube daily  glucagon  Injectable 1 milliGRAM(s) IntraMuscular once  heparin  Infusion 1100 Unit(s)/Hr (11 mL/Hr) IV Continuous <Continuous>  hydrALAZINE 25 milliGRAM(s) Oral three times a day  insulin lispro (ADMELOG) corrective regimen sliding scale   SubCutaneous every 6 hours  insulin NPH human recombinant 16 Unit(s) SubCutaneous <User Schedule>  Nephro-noam 1 Tablet(s) Oral daily  pantoprazole   Suspension 40 milliGRAM(s) Oral before breakfast  vancomycin  IVPB 750 milliGRAM(s) IV Intermittent once      LABS:  02-24    124[L]  |  82[L]  |  76[H]  ----------------------------<  280[H]  4.0   |  24  |  3.10[H]    Ca    9.0      24 Feb 2025 06:20  Phos  4.6     02-24  Mg     3.30     02-24      Creatinine Trend: 3.10 <--, 2.49 <--, 1.65 <--, 1.39 <--, 2.88 <--, 2.16 <--, 3.62 <--, 3.08 <--, 2.79 <--    Phosphorus: 4.6 mg/dL (02-24 @ 06:20)                              9.2    13.52 )-----------( 246      ( 24 Feb 2025 06:20 )             28.2     Urine Studies:  Urinalysis - [02-24-25 @ 06:20]      Color  / Appearance  / SG  / pH       Gluc 280 / Ketone   / Bili  / Urobili        Blood  / Protein  / Leuk Est  / Nitrite       RBC  / WBC  / Hyaline  / Gran  / Sq Epi  / Non Sq Epi  / Bacteria       Iron 46, TIBC 142, %sat 32      [02-19-25 @ 11:39]  Ferritin 3601      [02-19-25 @ 11:39]  PTH -- (Ca --)      [05-26-24 @ 01:20]   122  TSH 1.660      [10-05-24 @ 08:37]  Lipid: chol --, , HDL --, LDL --      [10-18-24 @ 06:00]    HBsAb 654.8      [02-19-25 @ 19:05]  HBsAg Nonreact      [02-19-25 @ 19:05]  HBcAb Nonreact      [02-19-25 @ 19:05]  HCV 0.19, Nonreact      [02-19-25 @ 19:05]      RADIOLOGY & ADDITIONAL STUDIES:

## 2025-02-24 NOTE — CHART NOTE - NSCHARTNOTEFT_GEN_A_CORE
Anti Xa level supra therapeutic >2.00. Writer spoke to pharmacy at [phone]. As per discussion, hold heparin infusion x 1 hour then resume at reduced rate of 11 cc / hour. Monitor Anti Xa level Q6 hours.    Adams Rowley PA-C,   Internal Medicine ACP   Respiratory Care Unit   Spectra [phone]

## 2025-02-24 NOTE — PROGRESS NOTE ADULT - SUBJECTIVE AND OBJECTIVE BOX
CHIEF COMPLAINT: Patient is a 74y old  Male who presents with a chief complaint of leukocytosis (20 Feb 2025 13:41)      INTERVAL EVENTS:   - no acute overnight events, vital signs stable, afebrile on ventilator  - continued on pt specific heparin infusion  - continued on IV Cefepime     ROS: Seen by bedside during AM rounds     OBJECTIVE:  ICU Vital Signs Last 24 Hrs  T(C): 35.9 (24 Feb 2025 04:00), Max: 37.1 (23 Feb 2025 08:40)  T(F): 96.7 (24 Feb 2025 04:00), Max: 98.7 (23 Feb 2025 08:40)  HR: 66 (24 Feb 2025 04:00) (66 - 86)  BP: 168/60 (24 Feb 2025 04:00) (121/61 - 168/60)  BP(mean): 88 (24 Feb 2025 04:00) (72 - 88)  ABP: --  ABP(mean): --  RR: 18 (24 Feb 2025 04:00) (16 - 18)  SpO2: 100% (24 Feb 2025 04:00) (100% - 100%)    O2 Parameters below as of 24 Feb 2025 04:00  Patient On (Oxygen Delivery Method): ventilator, AC    O2 Concentration (%): 40      Mode: AC/ CMV (Assist Control/ Continuous Mandatory Ventilation), RR (machine): 15, TV (machine): 400, FiO2: 40, PEEP: 5, ITime: 0.65, MAP: 9, PIP: 25    02-23 @ 07:01  -  02-24 @ 07:00  --------------------------------------------------------  IN: 1380 mL / OUT: 0 mL / NET: 1380 mL      CAPILLARY BLOOD GLUCOSE      POCT Blood Glucose.: 188 mg/dL (24 Feb 2025 05:38)      PHYSICAL EXAM:  General:   HEENT:   Lymph Nodes:  Neck:   Respiratory:   Cardiovascular:   Abdomen:   Extremities:   Skin:   Neurological:  Psychiatry:    Mode: AC/ CMV (Assist Control/ Continuous Mandatory Ventilation)  RR (machine): 15  TV (machine): 400  FiO2: 40  PEEP: 5  ITime: 0.65  MAP: 9  PIP: 25      HOSPITAL MEDICATIONS:  MEDICATIONS  (STANDING):  albuterol/ipratropium for Nebulization 3 milliLiter(s) Nebulizer every 6 hours  amLODIPine   Tablet 10 milliGRAM(s) Oral daily  atorvastatin 20 milliGRAM(s) Oral at bedtime  cefepime   IVPB 1000 milliGRAM(s) IV Intermittent every 24 hours  chlorhexidine 0.12% Liquid 15 milliLiter(s) Oral Mucosa every 12 hours  chlorhexidine 2% Cloths 1 Application(s) Topical daily  dextrose 5%. 1000 milliLiter(s) (50 mL/Hr) IV Continuous <Continuous>  dextrose 5%. 1000 milliLiter(s) (100 mL/Hr) IV Continuous <Continuous>  dextrose 50% Injectable 25 Gram(s) IV Push once  dextrose 50% Injectable 12.5 Gram(s) IV Push once  dextrose 50% Injectable 25 Gram(s) IV Push once  epoetin reilly-epbx (RETACRIT) Injectable 28941 Unit(s) IV Push <User Schedule>  ferrous    sulfate Liquid 300 milliGRAM(s) Enteral Tube daily  glucagon  Injectable 1 milliGRAM(s) IntraMuscular once  heparin  Infusion 1300 Unit(s)/Hr (13 mL/Hr) IV Continuous <Continuous>  hydrALAZINE 25 milliGRAM(s) Oral three times a day  insulin lispro (ADMELOG) corrective regimen sliding scale   SubCutaneous every 6 hours  insulin NPH human recombinant 16 Unit(s) SubCutaneous <User Schedule>  Nephro-noam 1 Tablet(s) Oral daily  pantoprazole   Suspension 40 milliGRAM(s) Oral before breakfast    MEDICATIONS  (PRN):  dextrose Oral Gel 15 Gram(s) Oral once PRN Blood Glucose LESS THAN 70 milliGRAM(s)/deciliter  sodium chloride 0.9% Bolus. 100 milliLiter(s) IV Bolus every 5 minutes PRN SBP LESS THAN or EQUAL to 80 mmHg      LABS:                        9.2    13.52 )-----------( 246      ( 24 Feb 2025 06:20 )             28.2     02-24    124[L]  |  82[L]  |  76[H]  ----------------------------<  280[H]  4.0   |  24  |  3.10[H]    Ca    9.0      24 Feb 2025 06:20  Phos  4.6     02-24  Mg     3.30     02-24      PT/INR - ( 22 Feb 2025 10:07 )   PT: 12.4 sec;   INR: 1.04 ratio         PTT - ( 23 Feb 2025 22:45 )  PTT:30.0 sec  Urinalysis Basic - ( 24 Feb 2025 06:20 )    Color: x / Appearance: x / SG: x / pH: x  Gluc: 280 mg/dL / Ketone: x  / Bili: x / Urobili: x   Blood: x / Protein: x / Nitrite: x   Leuk Esterase: x / RBC: x / WBC x   Sq Epi: x / Non Sq Epi: x / Bacteria: x      Arterial Blood Gas:  02-23 @ 05:20  7.46/40/137/28/98.6/4.2  ABG lactate: --    Venous Blood Gas:  02-22 @ 10:07  7.38/53/35/31/65.2  VBG Lactate: 2.7   CHIEF COMPLAINT: Patient is a 74y old  Male who presents with a chief complaint of leukocytosis (20 Feb 2025 13:41)      INTERVAL EVENTS:   - no acute overnight events, vital signs stable, afebrile on ventilator  - continued on pt specific heparin infusion - Anti Xa level supratherapeutic - infusion held and dose reduced to 11 cc / hour  - continued on IV Cefepime and IV Vancomycin     ROS: Seen by bedside during AM rounds     OBJECTIVE:  ICU Vital Signs Last 24 Hrs  T(C): 35.9 (24 Feb 2025 04:00), Max: 37.1 (23 Feb 2025 08:40)  T(F): 96.7 (24 Feb 2025 04:00), Max: 98.7 (23 Feb 2025 08:40)  HR: 66 (24 Feb 2025 04:00) (66 - 86)  BP: 168/60 (24 Feb 2025 04:00) (121/61 - 168/60)  BP(mean): 88 (24 Feb 2025 04:00) (72 - 88)  ABP: --  ABP(mean): --  RR: 18 (24 Feb 2025 04:00) (16 - 18)  SpO2: 100% (24 Feb 2025 04:00) (100% - 100%)    O2 Parameters below as of 24 Feb 2025 04:00  Patient On (Oxygen Delivery Method): ventilator, AC    O2 Concentration (%): 40      Mode: AC/ CMV (Assist Control/ Continuous Mandatory Ventilation), RR (machine): 15, TV (machine): 400, FiO2: 40, PEEP: 5, ITime: 0.65, MAP: 9, PIP: 25    02-23 @ 07:01  -  02-24 @ 07:00  --------------------------------------------------------  IN: 1380 mL / OUT: 0 mL / NET: 1380 mL      CAPILLARY BLOOD GLUCOSE      POCT Blood Glucose.: 188 mg/dL (24 Feb 2025 05:38)      PHYSICAL EXAM:  General: NAD  Neck: +trach to ventilator, neck supple, no JVD  Respiratory: +coarse breath sounds b/l, +copious white secretions noted in trach and oropharynx, no wheezes, no resp distress  Cardiovascular:  normal s1, s2, regular rate and rhythm  Abdomen: soft, non tender, +PEG  Extremities: no LE edema b/l, in SCDs  Skin: warm, dry  Neurological: AAO x 0, eyes closed but opens minimally to verbal stimuli, +withdraws to noxious stimuli, does not follow commands   Psychiatry: no agitation     Mode: AC/ CMV (Assist Control/ Continuous Mandatory Ventilation)  RR (machine): 15  TV (machine): 400  FiO2: 40  PEEP: 5  ITime: 0.65  MAP: 9  PIP: 25      HOSPITAL MEDICATIONS:  MEDICATIONS  (STANDING):  albuterol/ipratropium for Nebulization 3 milliLiter(s) Nebulizer every 6 hours  amLODIPine   Tablet 10 milliGRAM(s) Oral daily  atorvastatin 20 milliGRAM(s) Oral at bedtime  cefepime   IVPB 1000 milliGRAM(s) IV Intermittent every 24 hours  chlorhexidine 0.12% Liquid 15 milliLiter(s) Oral Mucosa every 12 hours  chlorhexidine 2% Cloths 1 Application(s) Topical daily  dextrose 5%. 1000 milliLiter(s) (50 mL/Hr) IV Continuous <Continuous>  dextrose 5%. 1000 milliLiter(s) (100 mL/Hr) IV Continuous <Continuous>  dextrose 50% Injectable 25 Gram(s) IV Push once  dextrose 50% Injectable 12.5 Gram(s) IV Push once  dextrose 50% Injectable 25 Gram(s) IV Push once  epoetin reilly-epbx (RETACRIT) Injectable 76869 Unit(s) IV Push <User Schedule>  ferrous    sulfate Liquid 300 milliGRAM(s) Enteral Tube daily  glucagon  Injectable 1 milliGRAM(s) IntraMuscular once  heparin  Infusion 1300 Unit(s)/Hr (13 mL/Hr) IV Continuous <Continuous>  hydrALAZINE 25 milliGRAM(s) Oral three times a day  insulin lispro (ADMELOG) corrective regimen sliding scale   SubCutaneous every 6 hours  insulin NPH human recombinant 16 Unit(s) SubCutaneous <User Schedule>  Nephro-noam 1 Tablet(s) Oral daily  pantoprazole   Suspension 40 milliGRAM(s) Oral before breakfast    MEDICATIONS  (PRN):  dextrose Oral Gel 15 Gram(s) Oral once PRN Blood Glucose LESS THAN 70 milliGRAM(s)/deciliter  sodium chloride 0.9% Bolus. 100 milliLiter(s) IV Bolus every 5 minutes PRN SBP LESS THAN or EQUAL to 80 mmHg      LABS:                        9.2    13.52 )-----------( 246      ( 24 Feb 2025 06:20 )             28.2     02-24    124[L]  |  82[L]  |  76[H]  ----------------------------<  280[H]  4.0   |  24  |  3.10[H]    Ca    9.0      24 Feb 2025 06:20  Phos  4.6     02-24  Mg     3.30     02-24      PT/INR - ( 22 Feb 2025 10:07 )   PT: 12.4 sec;   INR: 1.04 ratio         PTT - ( 23 Feb 2025 22:45 )  PTT:30.0 sec  Urinalysis Basic - ( 24 Feb 2025 06:20 )    Color: x / Appearance: x / SG: x / pH: x  Gluc: 280 mg/dL / Ketone: x  / Bili: x / Urobili: x   Blood: x / Protein: x / Nitrite: x   Leuk Esterase: x / RBC: x / WBC x   Sq Epi: x / Non Sq Epi: x / Bacteria: x      Arterial Blood Gas:  02-23 @ 05:20  7.46/40/137/28/98.6/4.2  ABG lactate: --    Venous Blood Gas:  02-22 @ 10:07  7.38/53/35/31/65.2  VBG Lactate: 2.7

## 2025-02-24 NOTE — PROGRESS NOTE ADULT - ASSESSMENT
74-year-old male hx trach/vent - , CVA, COPD, stage renal disease on dialysis 4 times a week ([facility]) history of pressure ulcer.  Patient presents the ED today for elevated white count. nephrology consulted for dialysis needs    ESRD:  [facility]  On HD MTTs[facility] via an A-V fistula. s/p fistulogram by vascular 2/18  Continue MWF  consent from wife in dialysis unit  HD today   monitor bmp    htn  fluctuating; suboptimal   continue norvasc 10 daily and hydralazine 25 tid  max uf as tolerated  up titrate hydralazine if sbp >150 persistently  monitor    Anemia:  Transfuse for Hg < 7.  s/p 1 unit PRBC 2/23  epo with hd  iron sat >20  monitor    leukocytosis  sepsis work up per team    hyponatremia  in setting of esrd and hyperglycemia  optimize dm control  hd per schedule  monitor   74-year-old male hx trach/vent - , CVA, COPD, stage renal disease on dialysis 4 times a week ([facility]) history of pressure ulcer.  Patient presents the ED today for elevated white count. nephrology consulted for dialysis needs    ESRD:  [facility]  On HD [facility] via an A-V fistula. s/p fistulogram by vascular 2/18  Continue MWF  consent from wife in dialysis unit  HD today   monitor bmp    htn  fluctuating; suboptimal   continue norvasc 10 daily and hydralazine 25 tid  max uf as tolerated  up titrate hydralazine if sbp >150 persistently  monitor    Anemia:  Transfuse for Hg < 7.  s/p 1 unit PRBC 2/23  epo with hd  iron sat >20  monitor    leukocytosis  sepsis work up per team    hyponatremia  in setting of esrd and hyperglycemia  optimize dm control  hd per schedule with uF  monitor

## 2025-02-24 NOTE — PROGRESS NOTE ADULT - NS ATTEND AMEND GEN_ALL_CORE FT
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 c/b acute hypoxemic and hypercapnic respiratory failure s/p ETT intubation/MV with failure to wean from ventilator s/p tracheostomy with hospital course further complicated by aspiration and B cereus bacteremia and RLE DVT followed by citrobacter PNA, GIB i/s/o AOCD, and fistula stenosis s/p fistulogram 6/4 ultimately transferred to acute rehab, decannulated, and discharged home until 10/2024 when pt returned to OSH with aspiration PNA c/b hypoxemic respiratory failure requiring ETT intubation/MV followed by tracheostomy placement and d/c to LTCF ([facility]) 11/2024 now presenting to [facility] on 2/18/25 for RUE fistulogram/angioplasty with vascular sx but with hospital course c/b concern fo recurrent PNA with uncontrolled hyperglycemia admitted to RCU for further medical management.      Pt p/w acute encephalopathy i/s/o previously known left talya and left cerebellum ischemic infarct (no vessel occlusion/stenosis) with residual right hemiplegia. Pt remains obtunded, but intermittently answers appropriately to questions/basic commands. W/u for secondary encephalopathy otherwise NTD. Continue on NOAC and statin. PT/OT following.     Pt with acute combined hypoxemic and hypercapnic respiratory failure s/p ETT intubation/MV with failure to wean from ventilator requiring repeat tracheostomy placement (1st time IP 5/14/24~7/2024 with successful decannulation, 2nd time OSH ~10/2024). Pt now vent dependent, will attempt weaning trials as tolerated. Trach care and suctioning as per RCU team. Oral hygiene and aspiration precautions in place. Wean O2 supplementation for goal O2 saturation 90-95%. Airway clearance therapy in place. CTA pending today 2/2 acute hypoxemic i/s/o known VTEs. Heparin drip in place for now.    Pt with oropharyngeal dysphagia s/p PEG placement now tolerating feeds at goal rate. Pt with previous hospitalization with concern for melena on 5/26, now stable with no further events. Will CTM carefully while on full A/C. PPI QD. CBC q24hr. Bowel regimen prn. Pt further developed new pressure ulcers found on hospital arrival, likely while at LTCD. Wound consult placed.     Pt with ESRD via right AVF found to have stenosis requiring temporary access on previous admission s/p RUE fistuloplasty (6/4) now returning for RUE fistulogram and angioplasty (2/18) but found to have severe hyperglycemia and leukocytosis requiring admission. Right brachial vein noted to have acute DVT. A/C initiated with heparin drip, but with persistently subtherapeutic pTTs found to have high anti-Xa levels concerning for heparin resistance, heparin now decreased with repeat anti-Xa level pending.      Dispo pending medical optimization. Pt full code. DVT ppx in place. 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

## 2025-02-24 NOTE — PROGRESS NOTE ADULT - NS ATTEND AMEND GEN_ALL_CORE FT
HD with UF  Hyponatremia should improve post HD NIGHT HOSPITALIST:    Niece aware of course and agrees with plan/care as above.   Given patient's comorbidities, patient's long term prognosis is guarded.   Emotional support provided to patient/ niece.  Care reviewed with covering NP/PA for endorsement to my physician colleagues in the AM.    Keron Oliva MD

## 2025-02-24 NOTE — PROGRESS NOTE ADULT - ASSESSMENT
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 Patent recently admitted in 6/2024 for left pontine and cerebellar CVA requiring tracheostomy. While at [facility] patient decannulated and passed SS with advanced diet to easy to chew with thin liquids, but complicated COVID requiring transfer to [facility] in 7/2024 and then ultimately discharged home. Per wife patient was doing well at home until 10/2024 when he was found with RLL with concern for aspiration requiring intubation, then tracheostomy, and ultimately discharged to NH with vent dependence in 11/2024. Patient now presented for RUE fistulogram and angioplasty with vascular, however course complicated by leukocytosis with concern for recurrent trachitis vs PNA and UTI, AOCD and hyperglycemia. Admitted to RCU for further management. Found with  PSA trachitis/ PNA and enterococcus faecium UTI. Course complicated by RUE brachial DVT and hypoxia with HD with concern for volume down vs PE?      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   - Last CTH in 10/2024 with no acute findings   - Hold [facility] CT for now   - Monitor mentation     CARDIOVASCULAR  # Hx of HTN and HLD  - Continue on hydral 25 and norvasc 10   - Monitor BP     # Incidental Pericardial effusion   - Incidental small pericardial effusion seen adjacent to right ventricle, however IVC appears flat and LE without edema with low concern for RV failure.   - Prior TTE reviewed from 4/2024 with normal RVLVSF with no pericardial effusion noted at the time.   - TTE 2/23 with EF 65, normal LVRVSF, mild LAD, normal RA, mild pulmonary hypertension, and small pericardial effusion noted adjacent to the anterior right ventricle with no echocardiographic evidence of tamponade  - Monitor hemodynamics     RESPIRATORY  # Respiratory failure second to PNA vs volume down vs PE?   - Hx of COPD with chronic respiratory failure with tracheostomy and vent dependence  - Admitted for angioplasty of RUE AVF, however course complicated by leukocytosis with concern for recurrent trachitis/ PNA.   - Course complicated by hypoxia during HD likely volume down vs migration of RUE brachial DVT with PE?   - Held volume removal, bolus given, and hypoxia improved.   - Continue on heparin GTT for now  - Pending CTA CHEST (scheduled for Monday preHD).  - Continue on vent 15/400/50  - Hold PS for now  - Continue on nebs     HEENT   # Hemoptysis   - Wife reported hemoptysis while at nursing home 2 weeks prior to admission   - No hemoptysis noted on admission   - Pending CT CHEST as above   - Monitor for now    GI  # Dysphagia with PEG   - Passed SS with advanced diet to easy to chew with thin liquids in 7/2024, however since recurrent aspiration in 10/2024 now with PEG/ vent dependence   - Continue on PEG TF   - Monitor BMs    RENAL  # ESRD on HD MTTHF   # AVF trouble  # Dehydration   - Presented for RUE fistulogram and angioplasty with vascular on 2/18   - Resumed on HD with no flow issue from AVF   - Course complicated by hypoxia with concern for volume down given small IVC and elevated lactate on 2/21  - Volume removal held, lactate improved, and hypoxia improved.   - Monitor volume status and continue on HD MTTF per renal   - Monitor renal function, electrolytes and UOP     # Hyponatremia likely volume down   - Concern for volume issues, however overall appears volume down with serum osmo 307  - Sodium 128 and improved with HD/ bolus during HD.   - Trend sodium with HD for now     # Elevated lactate   - Lactate elevated likely second to volume down?   - Post bolus lactate trending down from 3.3 to 2.7 to 1.3    INFECTIOUS DISEASE  # Trachitis vs PNA/ UTI   - Hx of steno and PSA in sputum   - Flu/ COVID negative   - MRSA negative   - UCx with enterococcus   - SCx with  PSA   - Found with leukocytosis and started on cefepime and christiano (2/19) and vanco on HD days (2/21).   - No further steno seen in sputum and continue on cefepime (2/19 - ) and vanco on HD days (2/21, 2/22 - ) for now   - WBC increased likely reactive to hypoxic event and now improving  - ID following     # PPX   - MRSA PCR negative  - Candida auris PCR POSITIVE  - Continue on isolation precautions per IC    HEME  # AOCD   - Presented for angioplasty and found with anemia to 6.7 s/p 1U PRBC 2/19   - Anemia panel with concern for AOCD   - Recurrent anemia while on heparin GTT, however no overt signs of bleeding (gastric lavage clear and stool brown) and PTT never therapeutic.   - s/p 1/2U PRBC on 2/23 and monitor HH   - Continue on EPO QIW and Fe     # Heparin resistance?   - PTT persistently low despite increasing heparin GTT   - Resume on heparin GTT and check anti-Xa levels with PTT     VASCULAR  # RUE DVT   - RUE edema noted with age-indeterminate, non-occlusive deep vein thrombosis noted in the right brachial vein.   - Case discussed with vascular who recommends full AC   - CTH from prior with encephalomalcia, however no hx of intracranial bleed   - Prior UGIB while on AC, however discharged on eliquis in 7/2024 and completed 6 month course for RLE DVT   - Continue on heparin GTT for now   - Case discussed with Wife who is apprehensive to full AC given bleeding hx, however she wishes for further investigation of pulmonary embolism and if positive she is amendable to risk of full anticoagulation. IF no PE found wife would like discuss further and possibly monitor off of anticoagulation.    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 Patent recently admitted in 6/2024 for left pontine and cerebellar CVA requiring tracheostomy. While at [facility] patient decannulated and passed SS with advanced diet to easy to chew with thin liquids, but complicated COVID requiring transfer to [facility] in 7/2024 and then ultimately discharged home. Per wife patient was doing well at home until 10/2024 when he was found with RLL with concern for aspiration requiring intubation, then tracheostomy, and ultimately discharged to NH with vent dependence in 11/2024. Patient now presented for RUE fistulogram and angioplasty with vascular, however course complicated by leukocytosis with concern for recurrent trachitis vs PNA and UTI, AOCD and hyperglycemia. Admitted to RCU for further management. Found with  PSA trachitis/ PNA and enterococcus faecium UTI. Course complicated by RUE brachial DVT and hypoxia with HD with concern for volume down vs PE?      NEUROLOGY  # Poor mentation second to metabolic encephalopathy vs psychosomatic/ depression   - Hx of L cerebellar and pontene embolic CVA x 2 with residual R hemiplegia   - AOx0, bedbound, and nonverbal at baseline per report, however per exam patient able to open eyes, able to follow commands on left (LUE>LLE) with SEVERE weakness, however noted with R hemiplegia per baseline  - Nods yes and no occasionally however keeps eyes closed likely psychosomatic vs metabolic encephalopathy with infections?   - Last CTH in 10/2024 with no acute findings   - Hold [facility] CT for now   - Monitor mentation     CARDIOVASCULAR  # Hx of HTN and HLD  - Continue on hydral 25 and norvasc 10   - Monitor BP     # Incidental Pericardial effusion   - Incidental small pericardial effusion seen adjacent to right ventricle, however IVC appears flat and LE without edema with low concern for RV failure.   - Prior TTE reviewed from 4/2024 with normal RVLVSF with no pericardial effusion noted at the time.   - TTE 2/23 with EF 65, normal LVRVSF, mild LAD, normal RA, mild pulmonary hypertension, and small pericardial effusion noted adjacent to the anterior right ventricle with no echocardiographic evidence of tamponade  - Monitor hemodynamics     RESPIRATORY  # Respiratory failure second to PNA vs volume down vs PE?   - Hx of COPD with chronic respiratory failure with tracheostomy and vent dependence  - Admitted for angioplasty of RUE AVF, however course complicated by leukocytosis with concern for recurrent trachitis/ PNA.   - Course complicated by hypoxia during HD likely volume down vs migration of RUE brachial DVT with PE?   - Held volume removal, bolus given, and hypoxia improved.   - Continue on heparin GTT for now  - Pending CTA CHEST   - Continue on vent 15/400/50  - Hold PS for now  - Continue on nebs     HEENT   # Hemoptysis   - Wife reported hemoptysis while at nursing home 2 weeks prior to admission   - No hemoptysis noted on admission   - Pending CT CHEST as above   - Monitor for now    GI  # Dysphagia with PEG   - Passed SS with advanced diet to easy to chew with thin liquids in 7/2024, however since recurrent aspiration in 10/2024 now with PEG/ vent dependence   - Continue on PEG TF   - Monitor BMs    RENAL  # ESRD on HD MTTHF   # AVF trouble  # Dehydration   - Presented for RUE fistulogram and angioplasty with vascular on 2/18   - Resumed on HD with no flow issue from AVF   - Course complicated by hypoxia with concern for volume down given small IVC and elevated lactate on 2/21  - Volume removal held, lactate improved, and hypoxia improved.   - Monitor volume status and continue on HD MTTF per renal   - Monitor renal function, electrolytes and UOP     # Hyponatremia likely volume down   - Concern for volume issues, however overall appears volume down with serum osmo 307  - Sodium 128 and improved with HD/ bolus during HD.   - Trend sodium with HD for now     # Elevated lactate   - Lactate elevated likely second to volume down?   - Post bolus lactate trending down from 3.3 to 2.7 to 1.3    INFECTIOUS DISEASE  # Trachitis vs PNA/ UTI   - Hx of steno and PSA in sputum   - Flu/ COVID negative   - MRSA negative   - UCx with enterococcus   - SCx with  PSA   - Found with leukocytosis and started on cefepime and christiano (2/19) and vanco on HD days (2/21).   - No further steno seen in sputum and continue on cefepime (2/19 - 2/25) and vanco on HD days (2/21, 2/22 - 2/25)   - WBC increased likely reactive to hypoxic event and now improving  - ID following     # PPX   - MRSA PCR negative  - Candida auris PCR POSITIVE  - Continue on isolation precautions per IC    HEME  # AOCD   - Presented for angioplasty and found with anemia to 6.7 s/p 1U PRBC 2/19   - Anemia panel with concern for AOCD   - Recurrent anemia while on heparin GTT, however no overt signs of bleeding (gastric lavage clear and stool brown) and PTT never therapeutic.   - s/p 1/2U PRBC on 2/23 and monitor HH   - Continue on EPO QIW and Fe     # Heparin resistance?   - PTT persistently low despite increasing heparin GTT   - Resume on heparin GTT and check anti-Xa levels with PTT   - Anti Xa level supra therapeutic on 2/24, heparin GTT held and dose reduced, no active signs of bleeding or distress  - Continue to monitor Anti Xa level Q6 hours until therapeutic x 2 and then monitor Q24 hours    VASCULAR  # RUE DVT   - RUE edema noted with age-indeterminate, non-occlusive deep vein thrombosis noted in the right brachial vein.   - Case discussed with vascular who recommends full AC   - CTH from prior with encephalomalcia, however no hx of intracranial bleed   - Prior UGIB while on AC, however discharged on eliquis in 7/2024 and completed 6 month course for RLE DVT   - Continue on heparin GTT for now   - Case discussed with Wife who is apprehensive to full AC given bleeding hx, however she wishes for further investigation of pulmonary embolism and if positive she is amendable to risk of full anticoagulation. IF no PE found wife would like discuss further and possibly monitor off of anticoagulation.    ENDOCRINE  # DM2 A1C 14.0 (1/2024) > 6.4 (2/2025)  - Presented with hyperglycemia and continued on lantus and ISS   - Increase lantus to 28U QHS and ISS, however FS continues to trend in 200s   - Adjust insulin to NPH 16U Q6H (1200, 1800 and 0000) with tube feeds ([id number])    - Monitor FS    ETHICS/ GOC    - FULL CODE     DISPO - Back to NH when able

## 2025-02-24 NOTE — PROGRESS NOTE ADULT - ASSESSMENT
75 y/o M PMhx trach/vent (last changed 10/23/24), CVA x2 with residual R hemiplegia, COPD, ESRD on HD MWF who presented to ED for leukocytosis when initially planned for fistulogram and noted to have a WBC of 18.     VAP, leukocytosis  SARS-CoV-2/ RSV/ flu PCR negative  MRSA PCR negative  CXR- Right lower lobe infiltrate  blood cultures- NGTD  resp cx w/  pseudomonas  leukocytosis likely multi-factorial 2/2 DVT, infection    UTI  UA w/ significant pyuria though noted epithelial cells indicating contamination  unable to assess urinary symptoms given patient's mentation  repeat urine cx also w/ E faecium    DVT  RUE US- age-indeterminate, non-occlusive deep vein thrombosis noted in the right brachial vein    ESRD  HD per nephrology    anemia  s/p 1 unit pRBC transfusion yesterday  on heparin gtt    vanc level 14.3  Recommendations  c/w cefepime 1g daily extended infusion dosing (renally adjusted)  - complete 7 day course w/ last day tomorrow 2/25  complete 5 day course of vanc- give 750mg post-HD today to complete course  aspiration precautions  contact isolation for candida auris    Steven Torres M.D.  [facility]  [phone]  After 5pm on weekdays and all day on weekends - please call [phone]  Available on [employer] teams    Thank you for consulting us and involving us in the management of this patients case. In addition to reviewing history, imaging, documents, labs, microbiology, took into account antibiotic stewardship, local antibiogram and infection control strategies and potential transmission issues.

## 2025-02-24 NOTE — PROGRESS NOTE ADULT - SUBJECTIVE AND OBJECTIVE BOX
[facility]  AUGUST Carbajal Y. Patel, S. Shah, G. Casimir  [phone]  ([phone] - weekdays after 5pm and weekends)    Name: JIMMY BLAND  Age/Gender: 74y Male  MRN: [id number]    Interval History:  Notes reviewed.   No concerning overnight events.  Afebrile.     Allergies: No Known Allergies      Objective:  Vitals:   T(F): 97 (02-24-25 @ 08:33), Max: 97.4 (02-23-25 @ 16:00)  HR: 66 (02-24-25 @ 11:40) (66 - 86)  BP: 170/57 (02-24-25 @ 08:33) (133/58 - 170/57)  RR: 17 (02-24-25 @ 08:33) (16 - 18)  SpO2: 100% (02-24-25 @ 11:40) (100% - 100%)  Physical Examination:  General: frail appearing  HEENT: anicteric, tracheostomy, on vent  Cardio: S1, S2, normal rate  Resp: decreased breath sounds  Abd: Soft. Nondistended. PEG  Ext: no LE edema  Skin: warm, dry    Laboratory Studies:  CBC:                       9.2    13.52 )-----------( 246      ( 24 Feb 2025 06:20 )             28.2     WBC Trend:  13.52 02-24-25 @ 06:20  14.46 02-23-25 @ 15:06  15.39 02-23-25 @ 06:30  14.31 02-23-25 @ 05:20  20.02 02-22-25 @ 10:07  14.43 02-21-25 @ 04:50  11.40 02-20-25 @ 06:00  13.53 02-19-25 @ 18:00  14.38 02-19-25 @ 09:23  14.57 02-19-25 @ 06:45  18.58 02-18-25 @ 16:00  18.60 02-18-25 @ 08:34    CMP: 02-24    124[L]  |  82[L]  |  76[H]  ----------------------------<  280[H]  4.0   |  24  |  3.10[H]    Ca    9.0      24 Feb 2025 06:20  Phos  4.6     02-24  Mg     3.30     02-24          Vancomycin Level, Random: 14.3 ug/mL (02-24-25 @ 06:20)  Vancomycin Level, Trough: 10.7 ug/mL (02-21-25 @ 21:21)    Urinalysis Basic - ( 24 Feb 2025 06:20 )    Color: x / Appearance: x / SG: x / pH: x  Gluc: 280 mg/dL / Ketone: x  / Bili: x / Urobili: x   Blood: x / Protein: x / Nitrite: x   Leuk Esterase: x / RBC: x / WBC x   Sq Epi: x / Non Sq Epi: x / Bacteria: x      Microbiology: reviewed     Culture - Urine (collected 02-20-25 @ 18:00)  Source: Clean Catch Clean Catch (Midstream)  Final Report (02-23-25 @ 08:45):    >100,000 CFU/ml Enterococcus faecium  Organism: Enterococcus faecium (02-23-25 @ 08:45)  Organism: Enterococcus faecium (02-23-25 @ 08:45)      Method Type: VIDA      -  Ampicillin: R >8 Predicts results to ampicillin/sulbactam, amoxacillin-clavulanate and  piperacillin-tazobactam.      -  Ciprofloxacin: R >2      -  Levofloxacin: R >4      -  Nitrofurantoin: S <=32 Should not be used to treat pyelonephritis.      -  Tetracycline: R >8      -  Vancomycin: S 0.5    Culture - INF Candida auris (collected 02-19-25 @ 18:46)  Source: Nares/Axilla/Groin Nares/Axilla/Groin  Final Report (02-22-25 @ 21:59):    Culture POSITIVE for Candida auris, susceptibilities not performed for    this specimen type.    This surveillance culture is intended for Infection Control purposes only.    Culture - Sputum (collected 02-19-25 @ 15:54)  Source: Trach Asp Tracheal Aspirate  Gram Stain (02-19-25 @ 22:37):    Numerous polymorphonuclear leukocytes per low power field    Rare Squamous epithelial cells per low power field    Numerous Gram Negative Rods seen per oil power field    Few Gram Positive Rods seen per oil power field  Final Report (02-21-25 @ 21:20):    Few Pseudomonas aeruginosa (Carbapenem Resistant)    Commensal dominick consistent with body site  Organism: Pseudomonas aeruginosa (Carbapenem Resistant) (02-21-25 @ 21:20)  Organism: Pseudomonas aeruginosa (Carbapenem Resistant) (02-21-25 @ 21:20)      Method Type: CarbaR      -  Resistance Gene to Carbapenem: Nondet  Organism: Pseudomonas aeruginosa (Carbapenem Resistant) (02-21-25 @ 21:20)      Method Type: VIDA      -  Aztreonam: R >16      -  Cefepime: S 8      -  Ceftazidime: I 16      -  Ceftazidime/Avibactam: S <=4      -  Ceftolozane/tazobactam: S <=2      -  Ciprofloxacin: S <=0.25      -  Imipenem: R >8      -  Levofloxacin: S <=0.5      -  Meropenem: R >8      -  Piperacillin/Tazobactam: R 64    Culture - Urine (collected 02-18-25 @ 18:03)  Source: Clean Catch Clean Catch (Midstream)  Final Report (02-20-25 @ 15:06):    >100,000 CFU/ml Enterococcus faecium  Organism: Enterococcus faecium (02-20-25 @ 15:06)  Organism: Enterococcus faecium (02-20-25 @ 15:06)      Method Type: VIDA      -  Ampicillin: R >8 Predicts results to ampicillin/sulbactam, amoxacillin-clavulanate and  piperacillin-tazobactam.      -  Ciprofloxacin: R >2      -  Levofloxacin: R >4      -  Nitrofurantoin: S <=32 Should not be used to treat pyelonephritis.      -  Tetracycline: R >8      -  Vancomycin: S 0.5    Culture - Blood (collected 02-18-25 @ 16:05)  Source: .Blood Blood-Venous  Final Report (02-23-25 @ 22:01):    No growth at 5 days    Culture - Blood (collected 02-18-25 @ 16:00)  Source: .Blood Blood-Peripheral  Final Report (02-23-25 @ 22:01):    No growth at 5 days        Radiology: reviewed     Medications:  albuterol/ipratropium for Nebulization 3 milliLiter(s) Nebulizer every 6 hours  amLODIPine   Tablet 10 milliGRAM(s) Oral daily  atorvastatin 20 milliGRAM(s) Oral at bedtime  cefepime   IVPB 1000 milliGRAM(s) IV Intermittent every 24 hours  chlorhexidine 0.12% Liquid 15 milliLiter(s) Oral Mucosa every 12 hours  chlorhexidine 2% Cloths 1 Application(s) Topical daily  dextrose 5%. 1000 milliLiter(s) IV Continuous <Continuous>  dextrose 5%. 1000 milliLiter(s) IV Continuous <Continuous>  dextrose 50% Injectable 25 Gram(s) IV Push once  dextrose 50% Injectable 12.5 Gram(s) IV Push once  dextrose 50% Injectable 25 Gram(s) IV Push once  dextrose Oral Gel 15 Gram(s) Oral once PRN  epoetin reilly-epbx (RETACRIT) Injectable 88444 Unit(s) IV Push <User Schedule>  ferrous    sulfate Liquid 300 milliGRAM(s) Enteral Tube daily  glucagon  Injectable 1 milliGRAM(s) IntraMuscular once  heparin  Infusion 1100 Unit(s)/Hr IV Continuous <Continuous>  hydrALAZINE 25 milliGRAM(s) Oral three times a day  insulin lispro (ADMELOG) corrective regimen sliding scale   SubCutaneous every 6 hours  insulin NPH human recombinant 16 Unit(s) SubCutaneous <User Schedule>  Nephro-noam 1 Tablet(s) Oral daily  pantoprazole   Suspension 40 milliGRAM(s) Oral before breakfast  sodium chloride 0.9% Bolus. 100 milliLiter(s) IV Bolus every 5 minutes PRN    Antimicrobials:  cefepime   IVPB 1000 milliGRAM(s) IV Intermittent every 24 hours

## 2025-02-25 LAB
ANION GAP SERPL CALC-SCNC: 17 MMOL/L — HIGH (ref 7–14)
APTT BLD: 80.7 SEC — HIGH (ref 24.5–35.6)
BASOPHILS # BLD AUTO: 0.03 K/UL — SIGNIFICANT CHANGE UP (ref 0–0.2)
BUN SERPL-MCNC: 35 MG/DL — HIGH (ref 7–23)
CALCIUM SERPL-MCNC: 8.8 MG/DL — SIGNIFICANT CHANGE UP (ref 8.4–10.5)
CHLORIDE SERPL-SCNC: 93 MMOL/L — LOW (ref 98–107)
CO2 SERPL-SCNC: 22 MMOL/L — SIGNIFICANT CHANGE UP (ref 22–31)
CREAT SERPL-MCNC: 1.78 MG/DL — HIGH (ref 0.5–1.3)
EGFR: 40 ML/MIN/1.73M2 — LOW
EGFR: 40 ML/MIN/1.73M2 — LOW
EOSINOPHIL # BLD AUTO: 0.21 K/UL — SIGNIFICANT CHANGE UP (ref 0–0.5)
EOSINOPHIL NFR BLD AUTO: 1.7 % — SIGNIFICANT CHANGE UP (ref 0–6)
GLUCOSE BLDC GLUCOMTR-MCNC: 123 MG/DL — HIGH (ref 70–99)
GLUCOSE BLDC GLUCOMTR-MCNC: 124 MG/DL — HIGH (ref 70–99)
GLUCOSE BLDC GLUCOMTR-MCNC: 139 MG/DL — HIGH (ref 70–99)
GLUCOSE BLDC GLUCOMTR-MCNC: 165 MG/DL — HIGH (ref 70–99)
GLUCOSE BLDC GLUCOMTR-MCNC: 204 MG/DL — HIGH (ref 70–99)
GLUCOSE SERPL-MCNC: 168 MG/DL — HIGH (ref 70–99)
HCT VFR BLD CALC: 31 % — LOW (ref 39–50)
IANC: 10.3 K/UL — HIGH (ref 1.8–7.4)
IMM GRANULOCYTES NFR BLD AUTO: 0.5 % — SIGNIFICANT CHANGE UP (ref 0–0.9)
LYMPHOCYTES # BLD AUTO: 0.76 K/UL — LOW (ref 1–3.3)
LYMPHOCYTES # BLD AUTO: 6.3 % — LOW (ref 13–44)
MAGNESIUM SERPL-MCNC: 2.7 MG/DL — HIGH (ref 1.6–2.6)
MCHC RBC-ENTMCNC: 23.5 PG — LOW (ref 27–34)
MCHC RBC-ENTMCNC: 31 G/DL — LOW (ref 32–36)
MCV RBC AUTO: 76 FL — LOW (ref 80–100)
MONOCYTES # BLD AUTO: 0.66 K/UL — SIGNIFICANT CHANGE UP (ref 0–0.9)
MONOCYTES NFR BLD AUTO: 5.5 % — SIGNIFICANT CHANGE UP (ref 2–14)
NEUTROPHILS # BLD AUTO: 10.3 K/UL — HIGH (ref 1.8–7.4)
NEUTROPHILS NFR BLD AUTO: 85.8 % — HIGH (ref 43–77)
NRBC # BLD AUTO: 0.14 K/UL — HIGH (ref 0–0)
NRBC # FLD: 0.14 K/UL — HIGH (ref 0–0)
NRBC BLD AUTO-RTO: 1 /100 WBCS — HIGH (ref 0–0)
PHOSPHATE SERPL-MCNC: 2.7 MG/DL — SIGNIFICANT CHANGE UP (ref 2.5–4.5)
PLATELET # BLD AUTO: 273 K/UL — SIGNIFICANT CHANGE UP (ref 150–400)
POTASSIUM SERPL-MCNC: 4.2 MMOL/L — SIGNIFICANT CHANGE UP (ref 3.5–5.3)
POTASSIUM SERPL-SCNC: 4.2 MMOL/L — SIGNIFICANT CHANGE UP (ref 3.5–5.3)
RBC # BLD: 4.08 M/UL — LOW (ref 4.2–5.8)
SODIUM SERPL-SCNC: 132 MMOL/L — LOW (ref 135–145)
UFH PPP CHRO-ACNC: 0.48 IU/ML — SIGNIFICANT CHANGE UP (ref 0.3–0.7)
UFH PPP CHRO-ACNC: 0.51 IU/ML — SIGNIFICANT CHANGE UP (ref 0.3–0.7)
WBC # BLD: 12.02 K/UL — HIGH (ref 3.8–10.5)
WBC # FLD AUTO: 12.02 K/UL — HIGH (ref 3.8–10.5)

## 2025-02-25 PROCEDURE — 99233 SBSQ HOSP IP/OBS HIGH 50: CPT | Mod: FS

## 2025-02-25 RX ADMIN — Medication 1 APPLICATION(S): at 12:16

## 2025-02-25 RX ADMIN — Medication 16 UNIT(S): at 17:59

## 2025-02-25 RX ADMIN — HEPARIN SODIUM 11.5 UNIT(S)/HR: 1000 INJECTION INTRAVENOUS; SUBCUTANEOUS at 20:10

## 2025-02-25 RX ADMIN — Medication 300 MILLIGRAM(S): at 12:18

## 2025-02-25 RX ADMIN — IPRATROPIUM BROMIDE AND ALBUTEROL SULFATE 3 MILLILITER(S): .5; 2.5 SOLUTION RESPIRATORY (INHALATION) at 08:30

## 2025-02-25 RX ADMIN — HEPARIN SODIUM 11.5 UNIT(S)/HR: 1000 INJECTION INTRAVENOUS; SUBCUTANEOUS at 07:57

## 2025-02-25 RX ADMIN — Medication 25 MILLIGRAM(S): at 12:18

## 2025-02-25 RX ADMIN — ATORVASTATIN CALCIUM 20 MILLIGRAM(S): 80 TABLET, FILM COATED ORAL at 20:08

## 2025-02-25 RX ADMIN — AMLODIPINE BESYLATE 10 MILLIGRAM(S): 10 TABLET ORAL at 05:41

## 2025-02-25 RX ADMIN — Medication 25 MILLIGRAM(S): at 20:08

## 2025-02-25 RX ADMIN — IPRATROPIUM BROMIDE AND ALBUTEROL SULFATE 3 MILLILITER(S): .5; 2.5 SOLUTION RESPIRATORY (INHALATION) at 03:12

## 2025-02-25 RX ADMIN — Medication 15 MILLILITER(S): at 17:54

## 2025-02-25 RX ADMIN — IPRATROPIUM BROMIDE AND ALBUTEROL SULFATE 3 MILLILITER(S): .5; 2.5 SOLUTION RESPIRATORY (INHALATION) at 20:16

## 2025-02-25 RX ADMIN — IPRATROPIUM BROMIDE AND ALBUTEROL SULFATE 3 MILLILITER(S): .5; 2.5 SOLUTION RESPIRATORY (INHALATION) at 16:37

## 2025-02-25 RX ADMIN — Medication 16 UNIT(S): at 12:18

## 2025-02-25 RX ADMIN — Medication 16 UNIT(S): at 00:10

## 2025-02-25 RX ADMIN — Medication 15 MILLILITER(S): at 05:41

## 2025-02-25 RX ADMIN — Medication 1 TABLET(S): at 12:18

## 2025-02-25 RX ADMIN — CEFEPIME 33.33 MILLIGRAM(S): 2 INJECTION, POWDER, FOR SOLUTION INTRAVENOUS at 00:35

## 2025-02-25 RX ADMIN — Medication 25 MILLIGRAM(S): at 05:41

## 2025-02-25 RX ADMIN — Medication 16 UNIT(S): at 23:40

## 2025-02-25 RX ADMIN — Medication 250 MILLIGRAM(S): at 00:35

## 2025-02-25 RX ADMIN — INSULIN LISPRO 1: 100 INJECTION, SOLUTION INTRAVENOUS; SUBCUTANEOUS at 23:40

## 2025-02-25 RX ADMIN — CEFEPIME 33.33 MILLIGRAM(S): 2 INJECTION, POWDER, FOR SOLUTION INTRAVENOUS at 20:08

## 2025-02-25 RX ADMIN — HEPARIN SODIUM 11.5 UNIT(S)/HR: 1000 INJECTION INTRAVENOUS; SUBCUTANEOUS at 05:41

## 2025-02-25 RX ADMIN — Medication 40 MILLIGRAM(S): at 05:41

## 2025-02-25 RX ADMIN — INSULIN LISPRO 2: 100 INJECTION, SOLUTION INTRAVENOUS; SUBCUTANEOUS at 05:58

## 2025-02-25 NOTE — PROGRESS NOTE ADULT - SUBJECTIVE AND OBJECTIVE BOX
[facility]  AUGUST Carbajal Y. Patel, S. Shah, G. Casimir  [phone]  ([phone] - weekdays after 5pm and weekends)    Name: JIMMY BLAND  Age/Gender: 74y Male  MRN: [id number]    Interval History:  Notes reviewed.   Afebrile.   remains on vent    Allergies: No Known Allergies      Objective:  Vitals:   T(F): 97.5 (02-25-25 @ 08:00), Max: 97.6 (02-25-25 @ 05:30)  HR: 77 (02-25-25 @ 11:58) (66 - 85)  BP: 151/67 (02-25-25 @ 08:00) (127/61 - 166/65)  RR: 17 (02-25-25 @ 08:00) (17 - 19)  SpO2: 100% (02-25-25 @ 11:58) (100% - 100%)  Physical Examination:  General: frail appearing  HEENT: anicteric, tracheostomy, on vent  Cardio: S1, S2, normal rate  Resp: decreased breath sounds  Abd: Soft. Nondistended. PEG  Ext: no LE edema  Skin: warm, dry    Laboratory Studies:  CBC:                       9.6    12.02 )-----------( 273      ( 25 Feb 2025 01:30 )             31.0     WBC Trend:  12.02 02-25-25 @ 01:30  13.52 02-24-25 @ 06:20  14.46 02-23-25 @ 15:06  15.39 02-23-25 @ 06:30  14.31 02-23-25 @ 05:20  20.02 02-22-25 @ 10:07  14.43 02-21-25 @ 04:50  11.40 02-20-25 @ 06:00  13.53 02-19-25 @ 18:00  14.38 02-19-25 @ 09:23  14.57 02-19-25 @ 06:45  18.58 02-18-25 @ 16:00    CMP: 02-25    132[L]  |  93[L]  |  35[H]  ----------------------------<  168[H]  4.2   |  22  |  1.78[H]    Ca    8.8      25 Feb 2025 01:30  Phos  2.7     02-25  Mg     2.70     02-25          Vancomycin Level, Random: 14.3 ug/mL (02-24-25 @ 06:20)  Vancomycin Level, Trough: 10.7 ug/mL (02-21-25 @ 21:21)    Urinalysis Basic - ( 25 Feb 2025 01:30 )    Color: x / Appearance: x / SG: x / pH: x  Gluc: 168 mg/dL / Ketone: x  / Bili: x / Urobili: x   Blood: x / Protein: x / Nitrite: x   Leuk Esterase: x / RBC: x / WBC x   Sq Epi: x / Non Sq Epi: x / Bacteria: x      Microbiology: reviewed     Culture - Urine (collected 02-20-25 @ 18:00)  Source: Clean Catch Clean Catch (Midstream)  Final Report (02-23-25 @ 08:45):    >100,000 CFU/ml Enterococcus faecium  Organism: Enterococcus faecium (02-23-25 @ 08:45)  Organism: Enterococcus faecium (02-23-25 @ 08:45)      Method Type: VIDA      -  Ampicillin: R >8 Predicts results to ampicillin/sulbactam, amoxacillin-clavulanate and  piperacillin-tazobactam.      -  Ciprofloxacin: R >2      -  Levofloxacin: R >4      -  Nitrofurantoin: S <=32 Should not be used to treat pyelonephritis.      -  Tetracycline: R >8      -  Vancomycin: S 0.5    Culture - INF Candida auris (collected 02-19-25 @ 18:46)  Source: Nares/Axilla/Groin Nares/Axilla/Groin  Final Report (02-22-25 @ 21:59):    Culture POSITIVE for Candida auris, susceptibilities not performed for    this specimen type.    This surveillance culture is intended for Infection Control purposes only.    Culture - Sputum (collected 02-19-25 @ 15:54)  Source: Trach Asp Tracheal Aspirate  Gram Stain (02-19-25 @ 22:37):    Numerous polymorphonuclear leukocytes per low power field    Rare Squamous epithelial cells per low power field    Numerous Gram Negative Rods seen per oil power field    Few Gram Positive Rods seen per oil power field  Final Report (02-21-25 @ 21:20):    Few Pseudomonas aeruginosa (Carbapenem Resistant)    Commensal dominick consistent with body site  Organism: Pseudomonas aeruginosa (Carbapenem Resistant) (02-21-25 @ 21:20)  Organism: Pseudomonas aeruginosa (Carbapenem Resistant) (02-21-25 @ 21:20)      Method Type: CarbaR      -  Resistance Gene to Carbapenem: Nondet  Organism: Pseudomonas aeruginosa (Carbapenem Resistant) (02-21-25 @ 21:20)      Method Type: VIDA      -  Aztreonam: R >16      -  Cefepime: S 8      -  Ceftazidime: I 16      -  Ceftazidime/Avibactam: S <=4      -  Ceftolozane/tazobactam: S <=2      -  Ciprofloxacin: S <=0.25      -  Imipenem: R >8      -  Levofloxacin: S <=0.5      -  Meropenem: R >8      -  Piperacillin/Tazobactam: R 64    Culture - Urine (collected 02-18-25 @ 18:03)  Source: Clean Catch Clean Catch (Midstream)  Final Report (02-20-25 @ 15:06):    >100,000 CFU/ml Enterococcus faecium  Organism: Enterococcus faecium (02-20-25 @ 15:06)  Organism: Enterococcus faecium (02-20-25 @ 15:06)      Method Type: VIDA      -  Ampicillin: R >8 Predicts results to ampicillin/sulbactam, amoxacillin-clavulanate and  piperacillin-tazobactam.      -  Ciprofloxacin: R >2      -  Levofloxacin: R >4      -  Nitrofurantoin: S <=32 Should not be used to treat pyelonephritis.      -  Tetracycline: R >8      -  Vancomycin: S 0.5    Culture - Blood (collected 02-18-25 @ 16:05)  Source: .Blood Blood-Venous  Final Report (02-23-25 @ 22:01):    No growth at 5 days    Culture - Blood (collected 02-18-25 @ 16:00)  Source: .Blood Blood-Peripheral  Final Report (02-23-25 @ 22:01):    No growth at 5 days        Radiology: reviewed     Medications:  albuterol/ipratropium for Nebulization 3 milliLiter(s) Nebulizer every 6 hours  amLODIPine   Tablet 10 milliGRAM(s) Oral daily  atorvastatin 20 milliGRAM(s) Oral at bedtime  cefepime   IVPB 1000 milliGRAM(s) IV Intermittent every 24 hours  chlorhexidine 0.12% Liquid 15 milliLiter(s) Oral Mucosa every 12 hours  chlorhexidine 2% Cloths 1 Application(s) Topical daily  dextrose 5%. 1000 milliLiter(s) IV Continuous <Continuous>  dextrose 5%. 1000 milliLiter(s) IV Continuous <Continuous>  dextrose 50% Injectable 25 Gram(s) IV Push once  dextrose 50% Injectable 12.5 Gram(s) IV Push once  dextrose 50% Injectable 25 Gram(s) IV Push once  dextrose Oral Gel 15 Gram(s) Oral once PRN  epoetin reilly-epbx (RETACRIT) Injectable 06175 Unit(s) IV Push <User Schedule>  ferrous    sulfate Liquid 300 milliGRAM(s) Enteral Tube daily  glucagon  Injectable 1 milliGRAM(s) IntraMuscular once  heparin  Infusion 1100 Unit(s)/Hr IV Continuous <Continuous>  hydrALAZINE 25 milliGRAM(s) Oral three times a day  insulin lispro (ADMELOG) corrective regimen sliding scale   SubCutaneous every 6 hours  insulin NPH human recombinant 16 Unit(s) SubCutaneous <User Schedule>  Nephro-noam 1 Tablet(s) Oral daily  pantoprazole   Suspension 40 milliGRAM(s) Oral before breakfast  sodium chloride 0.9% Bolus. 100 milliLiter(s) IV Bolus every 5 minutes PRN    Antimicrobials:  cefepime   IVPB 1000 milliGRAM(s) IV Intermittent every 24 hours

## 2025-02-25 NOTE — CHART NOTE - NSCHARTNOTEFT_GEN_A_CORE
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 Patent recently admitted in 6/2024 for left pontine and cerebellar CVA requiring tracheostomy.    DIET PRESCRIPTION:  Diet, NPO with Tube Feed:   Tube Feeding Modality: Gastrostomy  Nepro with Carb Steady (NEPRORTH)  Total Volume for 24 Hours (mL): 1170  Continuous  Starting Tube Feed Rate {mL per Hour}: 25  Increase Tube Feed Rate by (mL): 10     Every 4 hours  Until Goal Tube Feed Rate (mL per Hour): 65  Tube Feed Duration (in Hours): 18  Tube Feed Start Time: 12:00  Tube Feed Stop Time: 06:00  Liquid Protein Supplement     Qty per Day:  1 (02-19-25 @ 14:53)      NUTRITION COURSE:  Unable to conduct nutrition interview 2/2 decreased cognition. Information obtained from comprehensive chart review and per RN today: No reports of any recent episodes of nausea, vomiting, diarrhea or constipation, Last BM noted on 2/25 per RN flowsheets. Pt remains NPO TF only as sole source of nutrition and hydration; Nepro @ 65ml/hr x18hrs (IBW: 69kg) (1170ml, 2106kcal (30kcal/kg), 95 gm protein (1.4gm/kg), 854ml free water).     PERTINENT MEDICATIONS:  MEDICATIONS  (STANDING):  albuterol/ipratropium for Nebulization 3 milliLiter(s) Nebulizer every 6 hours  amLODIPine   Tablet 10 milliGRAM(s) Oral daily  atorvastatin 20 milliGRAM(s) Oral at bedtime  cefepime   IVPB 1000 milliGRAM(s) IV Intermittent every 24 hours  chlorhexidine 0.12% Liquid 15 milliLiter(s) Oral Mucosa every 12 hours  chlorhexidine 2% Cloths 1 Application(s) Topical daily  dextrose 5%. 1000 milliLiter(s) (100 mL/Hr) IV Continuous <Continuous>  dextrose 5%. 1000 milliLiter(s) (50 mL/Hr) IV Continuous <Continuous>  dextrose 50% Injectable 25 Gram(s) IV Push once  dextrose 50% Injectable 12.5 Gram(s) IV Push once  dextrose 50% Injectable 25 Gram(s) IV Push once  epoetin reilly-epbx (RETACRIT) Injectable 39394 Unit(s) IV Push <User Schedule>  ferrous    sulfate Liquid 300 milliGRAM(s) Enteral Tube daily  glucagon  Injectable 1 milliGRAM(s) IntraMuscular once  heparin  Infusion 1100 Unit(s)/Hr (11.5 mL/Hr) IV Continuous <Continuous>  hydrALAZINE 25 milliGRAM(s) Oral three times a day  insulin lispro (ADMELOG) corrective regimen sliding scale   SubCutaneous every 6 hours  insulin NPH human recombinant 16 Unit(s) SubCutaneous <User Schedule>  Nephro-noam 1 Tablet(s) Oral daily  pantoprazole   Suspension 40 milliGRAM(s) Oral before breakfast    MEDICATIONS  (PRN):  dextrose Oral Gel 15 Gram(s) Oral once PRN Blood Glucose LESS THAN 70 milliGRAM(s)/deciliter  sodium chloride 0.9% Bolus. 100 milliLiter(s) IV Bolus every 5 minutes PRN SBP LESS THAN or EQUAL to 80 mmHg    [x] Relevant notes on medications: continues on nephrovite and ferrous sulfate for micronutrient and mineral provisions     PERTINENT LABS:  02-25 Na132 mmol/L[L] Glu 168 mg/dL[H] K+ 4.2 mmol/L Cr  1.78 mg/dL[H] BUN 35 mg/dL[H] 02-25 Phos 2.7 mg/dL 02-19 Alb 3.1 g/dL[L]     A1C with Estimated Average Glucose Result: 6.4 % (02-19-25 @ 06:45)  A1C with Estimated Average Glucose Result: 4.8 % (10-05-24 @ 08:37)  POCT Blood Glucose.: 204 mg/dL (25 Feb 2025 05:45)  POCT Blood Glucose.: 139 mg/dL (25 Feb 2025 00:04)  POCT Blood Glucose.: 191 mg/dL (24 Feb 2025 17:07)  POCT Blood Glucose.: 139 mg/dL (24 Feb 2025 11:53)    [x] Relevant notes on labs: elevated glucose levels- continues on insulin regimen and therapeutic formula.     ANTHROPOMETRICS:   Height (cm): 162.6 (02-18 @ 14:16)  Weight (kg): 52 (02-25), 53.8 (02-19 @ 12:20)  BMI (kg/m2): 20.3 (02-19 @ 12:20)  Weight Assessment: Pt with significant wt loss x 1 week? (-3.4%), unclear if this is a true wt loss. would recommend reweight of Pt.     PHYSICAL ASSESSMENT, per flowsheets:  Edema: none noted  Pressure Injury: b/l buttocks stage 2 and sacral stage 2 per RN flowsheets      ESTIMATED NEEDS:  [X] No change since previous assessment, based on  lb, 69.8 kg  25-30 kcal/kg, 4290-8114 kcal/d, 1.4-1.6 gm protein/kg,  gm/d      PREVIOUS NUTRITION DX: [x ] Malnutrition   Nutrition Diagnosis is [x ] ongoing  [ ] resolved [ ] not applicable   New Nutrition Diagnosis: [ x] not applicable     EDUCATION:  [x ] Not applicable secondary to decreased cognition      RECOMMENDATIONS/INTERVENTIONS:  1) Continue on current TF regimen: Nepro @ 65ml/hr x18hrs. May consider switching Pt to bolus regimen: Nepro @ 290ml 4x/day (1160ml total volume, 2088 kcal, 94 gm protein, 824ml free water from formula).   2) continue on nephrovite and ferrous sulfate for micronutrient and mineral provisions   3) Continue on LPS 1x/day (15gm protein).     MONITORING & EVALUATING:  Tolerance to diet/supplement, nutrition related lab values, weight trends, BMs/GI distress, hydration status, skin integrity.    Emerald Yu, MS, RDN | Available on [employer] TEAMS | Office #[phone]

## 2025-02-25 NOTE — PROGRESS NOTE ADULT - ASSESSMENT
74-year-old male hx trach/vent - , CVA, COPD, stage renal disease on dialysis 4 times a week (MTRF) history of pressure ulcer.  Patient presents the ED today for elevated white count. nephrology consulted for dialysis needs    ESRD:  [facility]  On HD MTTsF via an A-V fistula. s/p fistulogram by vascular 2/18  Continue MWF in hospital  consent from wife in dialysis unit  s/p hd 2/24 uf 2L hd tmr  monitor bmp    htn  fluctuating; suboptimal   continue norvasc 10 daily and hydralazine 25 tid  max uf as tolerated  up titrate hydralazine if sbp >150 persistently  monitor    Anemia:  Transfuse for Hg < 7.  s/p 1 unit PRBC 2/23  epo with hd  iron sat >20  monitor    leukocytosis  sepsis work up per team    hyponatremia  in setting of esrd and hyperglycemia  optimize dm control  hd per schedule with uF  monitor

## 2025-02-25 NOTE — PROGRESS NOTE ADULT - SUBJECTIVE AND OBJECTIVE BOX
[facility] [place] [facility]   MD ELIANE BHAGAT MD ANGELA WONG, PA    TEL:  OFFICE: [phone]  From 5pm-7am Answering Service [phone]    -- RENAL FOLLOW UP NOTE ---Date of Service 02-25-25 @ 10:13    Patient is a 74y old  Male who presents with a chief complaint of leukocytosis (20 Feb 2025 13:41)      Patient seen and examined at bedside.     VITALS:  T(F): 97.5 (02-25-25 @ 08:00), Max: 97.6 (02-25-25 @ 05:30)  HR: 80 (02-25-25 @ 08:25)  BP: 151/67 (02-25-25 @ 08:00)  RR: 17 (02-25-25 @ 08:00)  SpO2: 100% (02-25-25 @ 08:25)  Wt(kg): --    02-24 @ 07:01  -  02-25 @ 07:00  --------------------------------------------------------  IN: 920 mL / OUT: 2400 mL / NET: -1480 mL          PHYSICAL EXAM:  General: Nonverbal  Neck: +trach  Respiratory: CTAB, no wheezes, rales or rhonchi  Cardiovascular: S1, S2, RRR  Gastrointestinal: +peg  Extremities: No peripheral edema    Hospital Medications:   MEDICATIONS  (STANDING):  albuterol/ipratropium for Nebulization 3 milliLiter(s) Nebulizer every 6 hours  amLODIPine   Tablet 10 milliGRAM(s) Oral daily  atorvastatin 20 milliGRAM(s) Oral at bedtime  cefepime   IVPB 1000 milliGRAM(s) IV Intermittent every 24 hours  chlorhexidine 0.12% Liquid 15 milliLiter(s) Oral Mucosa every 12 hours  chlorhexidine 2% Cloths 1 Application(s) Topical daily  dextrose 5%. 1000 milliLiter(s) (100 mL/Hr) IV Continuous <Continuous>  dextrose 5%. 1000 milliLiter(s) (50 mL/Hr) IV Continuous <Continuous>  dextrose 50% Injectable 25 Gram(s) IV Push once  dextrose 50% Injectable 12.5 Gram(s) IV Push once  dextrose 50% Injectable 25 Gram(s) IV Push once  epoetin reilly-epbx (RETACRIT) Injectable 07963 Unit(s) IV Push <User Schedule>  ferrous    sulfate Liquid 300 milliGRAM(s) Enteral Tube daily  glucagon  Injectable 1 milliGRAM(s) IntraMuscular once  heparin  Infusion 1100 Unit(s)/Hr (11.5 mL/Hr) IV Continuous <Continuous>  hydrALAZINE 25 milliGRAM(s) Oral three times a day  insulin lispro (ADMELOG) corrective regimen sliding scale   SubCutaneous every 6 hours  insulin NPH human recombinant 16 Unit(s) SubCutaneous <User Schedule>  Nephro-noam 1 Tablet(s) Oral daily  pantoprazole   Suspension 40 milliGRAM(s) Oral before breakfast      LABS:  02-25    132[L]  |  93[L]  |  35[H]  ----------------------------<  168[H]  4.2   |  22  |  1.78[H]    Ca    8.8      25 Feb 2025 01:30  Phos  2.7     02-25  Mg     2.70     02-25      Creatinine Trend: 1.78 <--, 3.10 <--, 2.49 <--, 1.65 <--, 1.39 <--, 2.88 <--, 2.16 <--, 3.62 <--, 3.08 <--    Phosphorus: 2.7 mg/dL (02-25 @ 01:30)                              9.6    12.02 )-----------( 273      ( 25 Feb 2025 01:30 )             31.0     Urine Studies:  Urinalysis - [02-25-25 @ 01:30]      Color  / Appearance  / SG  / pH       Gluc 168 / Ketone   / Bili  / Urobili        Blood  / Protein  / Leuk Est  / Nitrite       RBC  / WBC  / Hyaline  / Gran  / Sq Epi  / Non Sq Epi  / Bacteria       Iron 46, TIBC 142, %sat 32      [02-19-25 @ 11:39]  Ferritin 3601      [02-19-25 @ 11:39]  PTH -- (Ca --)      [05-26-24 @ 01:20]   122  TSH 1.660      [10-05-24 @ 08:37]  Lipid: chol --, , HDL --, LDL --      [10-18-24 @ 06:00]    HBsAb 654.8      [02-19-25 @ 19:05]  HBsAg Nonreact      [02-19-25 @ 19:05]  HBcAb Nonreact      [02-19-25 @ 19:05]  HCV 0.19, Nonreact      [02-19-25 @ 19:05]      RADIOLOGY & ADDITIONAL STUDIES:

## 2025-02-25 NOTE — PROGRESS NOTE ADULT - ASSESSMENT
73 y/o M PMhx trach/vent (last changed 10/23/24), CVA x2 with residual R hemiplegia, COPD, ESRD on HD MWF who presented to ED for leukocytosis when initially planned for fistulogram and noted to have a WBC of 18.     VAP, leukocytosis  SARS-CoV-2/ RSV/ flu PCR negative  MRSA PCR negative  CXR- Right lower lobe infiltrate  blood cultures- NGTD  resp cx w/  pseudomonas  leukocytosis likely multi-factorial 2/2 DVT, infection    UTI  UA w/ significant pyuria though noted epithelial cells indicating contamination  unable to assess urinary symptoms given patient's mentation  repeat urine cx also w/ E faecium    DVT  RUE US- age-indeterminate, non-occlusive deep vein thrombosis noted in the right brachial vein    ESRD  HD per nephrology    anemia  s/p 1 unit pRBC transfusion yesterday  on heparin gtt    Recommendations  c/w cefepime 1g daily extended infusion dosing (renally adjusted)  - complete 7 day course w/ last day today 2/25  s/p vanc course, received last dose this AM  aspiration precautions  contact isolation for candida auris    Steven Torres M.D.  [facility]  [phone]  After 5pm on weekdays and all day on weekends - please call [phone]  Available on [employer] teams    Thank you for consulting us and involving us in the management of this patients case. In addition to reviewing history, imaging, documents, labs, microbiology, took into account antibiotic stewardship, local antibiogram and infection control strategies and potential transmission issues.

## 2025-02-25 NOTE — PROGRESS NOTE ADULT - NS ATTEND AMEND GEN_ALL_CORE FT
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 c/b acute hypoxemic and hypercapnic respiratory failure s/p ETT intubation/MV with failure to wean from ventilator s/p tracheostomy with hospital course further complicated by aspiration and B cereus bacteremia and RLE DVT followed by citrobacter PNA, GIB i/s/o AOCD, and fistula stenosis s/p fistulogram 6/4 ultimately transferred to acute rehab, decannulated, and discharged home until 10/2024 when pt returned to OSH with aspiration PNA c/b hypoxemic respiratory failure requiring ETT intubation/MV followed by tracheostomy placement and d/c to LTCF ([facility]) 11/2024 now presenting to [facility] on 2/18/25 for RUE fistulogram/angioplasty with vascular sx but with hospital course c/b concern fo recurrent PNA with uncontrolled hyperglycemia admitted to RCU for further medical management.      Pt p/w acute encephalopathy i/s/o previously known left talya and left cerebellum ischemic infarct (no vessel occlusion/stenosis) with residual right hemiplegia. Pt remains obtunded, but intermittently answers appropriately to questions/basic commands. W/u for secondary encephalopathy otherwise NTD. Continue on NOAC and statin. PT/OT following.     Pt with acute combined hypoxemic and hypercapnic respiratory failure s/p ETT intubation/MV with failure to wean from ventilator requiring repeat tracheostomy placement (1st time IP 5/14/24~7/2024 with successful decannulation, 2nd time OSH ~10/2024). Pt now vent dependent, will attempt weaning trials as tolerated. Trach care and suctioning as per RCU team. Oral hygiene and aspiration precautions in place. Wean O2 supplementation for goal O2 saturation 90-95%. Airway clearance therapy in place. CTA performed 2/24 2/2 acute hypoxemic i/s/o known VTEs (-) PE but with significant mucous burden with associated atelectasis. IPV added to regimen.     Pt with oropharyngeal dysphagia s/p PEG placement now tolerating feeds at goal rate. Pt with previous hospitalization with concern for melena on 5/26, now stable with no further events. Will CTM carefully while on full A/C. PPI QD. CBC q24hr. Bowel regimen prn. Pt further developed new pressure ulcers found on hospital arrival, likely while at LTCD. Wound consult placed, recs pending.     Pt with ESRD via right AVF found to have stenosis requiring temporary access on previous admission s/p RUE fistuloplasty (6/4) now returning for RUE fistulogram and angioplasty (2/18) but found to have severe hyperglycemia and leukocytosis requiring admission. Right brachial vein noted to have acute DVT. A/C initiated with heparin drip, but with persistently subtherapeutic pTTs found to have high anti-Xa levels concerning for heparin resistance, heparin now decreased with repeat anti-Xa level now therapeutic.    Dispo pending medical optimization. Pt full code. DVT ppx in place. Pt found on this admission to have (+) candida auris colonization (as per wife, pt's neighbor at LTCF was recently found to have it and pt was moved out of room at that time.) Will continue to update family and discuss plan of care throughout hospitalization, discussed at length with pt's wife and daughter at bedside today.

## 2025-02-25 NOTE — PROGRESS NOTE ADULT - SUBJECTIVE AND OBJECTIVE BOX
CHIEF COMPLAINT: Patient is a 74y old  Male who presents with a chief complaint of leukocytosis (20 Feb 2025 13:41)      INTERVAL EVENTS:   - no acute overnight events, vital signs stable, afebrile, on ventilator  - CTA preliminary without PE  - continued on heparin infusion for DVT, Anti Xa level therapeutic overnight pending repeat    ROS: Seen by bedside during AM rounds     OBJECTIVE:  ICU Vital Signs Last 24 Hrs  T(C): 36.4 (25 Feb 2025 05:30), Max: 36.4 (25 Feb 2025 05:30)  T(F): 97.6 (25 Feb 2025 05:30), Max: 97.6 (25 Feb 2025 05:30)  HR: 78 (25 Feb 2025 05:30) (66 - 85)  BP: 166/65 (25 Feb 2025 05:30) (127/61 - 170/57)  BP(mean): 81 (24 Feb 2025 16:00) (81 - 88)  ABP: --  ABP(mean): --  RR: 18 (25 Feb 2025 05:30) (17 - 19)  SpO2: 100% (25 Feb 2025 05:30) (100% - 100%)    O2 Parameters below as of 25 Feb 2025 05:30  Patient On (Oxygen Delivery Method): ventilator    O2 Concentration (%): 40      Mode: AC/ CMV (Assist Control/ Continuous Mandatory Ventilation), RR (machine): 15, TV (machine): 400, FiO2: 40, PEEP: 5, ITime: 0.65, MAP: 8, PIP: 25    02-24 @ 07:01  -  02-25 @ 07:00  --------------------------------------------------------  IN: 920 mL / OUT: 2400 mL / NET: -1480 mL      CAPILLARY BLOOD GLUCOSE      POCT Blood Glucose.: 204 mg/dL (25 Feb 2025 05:45)      PHYSICAL EXAM:  General:   HEENT:   Lymph Nodes:  Neck:   Respiratory:   Cardiovascular:   Abdomen:   Extremities:   Skin:   Neurological:  Psychiatry:    Mode: AC/ CMV (Assist Control/ Continuous Mandatory Ventilation)  RR (machine): 15  TV (machine): 400  FiO2: 40  PEEP: 5  ITime: 0.65  MAP: 8  PIP: 25      HOSPITAL MEDICATIONS:  MEDICATIONS  (STANDING):  albuterol/ipratropium for Nebulization 3 milliLiter(s) Nebulizer every 6 hours  amLODIPine   Tablet 10 milliGRAM(s) Oral daily  atorvastatin 20 milliGRAM(s) Oral at bedtime  cefepime   IVPB 1000 milliGRAM(s) IV Intermittent every 24 hours  chlorhexidine 0.12% Liquid 15 milliLiter(s) Oral Mucosa every 12 hours  chlorhexidine 2% Cloths 1 Application(s) Topical daily  dextrose 5%. 1000 milliLiter(s) (100 mL/Hr) IV Continuous <Continuous>  dextrose 5%. 1000 milliLiter(s) (50 mL/Hr) IV Continuous <Continuous>  dextrose 50% Injectable 25 Gram(s) IV Push once  dextrose 50% Injectable 12.5 Gram(s) IV Push once  dextrose 50% Injectable 25 Gram(s) IV Push once  epoetin reilly-epbx (RETACRIT) Injectable 72876 Unit(s) IV Push <User Schedule>  ferrous    sulfate Liquid 300 milliGRAM(s) Enteral Tube daily  glucagon  Injectable 1 milliGRAM(s) IntraMuscular once  heparin  Infusion 1100 Unit(s)/Hr (11.5 mL/Hr) IV Continuous <Continuous>  hydrALAZINE 25 milliGRAM(s) Oral three times a day  insulin lispro (ADMELOG) corrective regimen sliding scale   SubCutaneous every 6 hours  insulin NPH human recombinant 16 Unit(s) SubCutaneous <User Schedule>  Nephro-noam 1 Tablet(s) Oral daily  pantoprazole   Suspension 40 milliGRAM(s) Oral before breakfast    MEDICATIONS  (PRN):  dextrose Oral Gel 15 Gram(s) Oral once PRN Blood Glucose LESS THAN 70 milliGRAM(s)/deciliter  sodium chloride 0.9% Bolus. 100 milliLiter(s) IV Bolus every 5 minutes PRN SBP LESS THAN or EQUAL to 80 mmHg      LABS:                        9.6    12.02 )-----------( 273      ( 25 Feb 2025 01:30 )             31.0     02-25    132[L]  |  93[L]  |  35[H]  ----------------------------<  168[H]  4.2   |  22  |  1.78[H]    Ca    8.8      25 Feb 2025 01:30  Phos  2.7     02-25  Mg     2.70     02-25      PTT - ( 25 Feb 2025 01:30 )  PTT:80.7 sec  Urinalysis Basic - ( 25 Feb 2025 01:30 )    Color: x / Appearance: x / SG: x / pH: x  Gluc: 168 mg/dL / Ketone: x  / Bili: x / Urobili: x   Blood: x / Protein: x / Nitrite: x   Leuk Esterase: x / RBC: x / WBC x   Sq Epi: x / Non Sq Epi: x / Bacteria: x         CHIEF COMPLAINT: Patient is a 74y old  Male who presents with a chief complaint of leukocytosis (20 Feb 2025 13:41)      INTERVAL EVENTS:   - no acute overnight events, vital signs stable, afebrile, on ventilator  - CTA preliminary without PE  - continued on heparin infusion for DVT, Anti Xa level therapeutic overnight pending repeat    ROS: Seen by bedside during AM rounds, unable to obtain d/t ventilator     OBJECTIVE:  ICU Vital Signs Last 24 Hrs  T(C): 36.4 (25 Feb 2025 05:30), Max: 36.4 (25 Feb 2025 05:30)  T(F): 97.6 (25 Feb 2025 05:30), Max: 97.6 (25 Feb 2025 05:30)  HR: 78 (25 Feb 2025 05:30) (66 - 85)  BP: 166/65 (25 Feb 2025 05:30) (127/61 - 170/57)  BP(mean): 81 (24 Feb 2025 16:00) (81 - 88)  ABP: --  ABP(mean): --  RR: 18 (25 Feb 2025 05:30) (17 - 19)  SpO2: 100% (25 Feb 2025 05:30) (100% - 100%)    O2 Parameters below as of 25 Feb 2025 05:30  Patient On (Oxygen Delivery Method): ventilator    O2 Concentration (%): 40      Mode: AC/ CMV (Assist Control/ Continuous Mandatory Ventilation), RR (machine): 15, TV (machine): 400, FiO2: 40, PEEP: 5, ITime: 0.65, MAP: 8, PIP: 25    02-24 @ 07:01  -  02-25 @ 07:00  --------------------------------------------------------  IN: 920 mL / OUT: 2400 mL / NET: -1480 mL      CAPILLARY BLOOD GLUCOSE      POCT Blood Glucose.: 204 mg/dL (25 Feb 2025 05:45)      PHYSICAL EXAM:  General: NAD  Neck: +trach to ventilator, neck supple, no JVD  Respiratory: +coarse breath sounds b/l, no wheezes, no resp distress  Cardiovascular:  normal s1, s2, regular rate and rhythm  Abdomen: soft, non tender, +PEG  Extremities: no LE edema b/l, in SCDs  Skin: warm, dry  Neurological: AAO x 0, eyes closed but opens minimally to verbal stimuli, +withdraws to noxious stimuli, does not follow commands   Psychiatry: no agitation       Mode: AC/ CMV (Assist Control/ Continuous Mandatory Ventilation)  RR (machine): 15  TV (machine): 400  FiO2: 40  PEEP: 5  ITime: 0.65  MAP: 8  PIP: 25      HOSPITAL MEDICATIONS:  MEDICATIONS  (STANDING):  albuterol/ipratropium for Nebulization 3 milliLiter(s) Nebulizer every 6 hours  amLODIPine   Tablet 10 milliGRAM(s) Oral daily  atorvastatin 20 milliGRAM(s) Oral at bedtime  cefepime   IVPB 1000 milliGRAM(s) IV Intermittent every 24 hours  chlorhexidine 0.12% Liquid 15 milliLiter(s) Oral Mucosa every 12 hours  chlorhexidine 2% Cloths 1 Application(s) Topical daily  dextrose 5%. 1000 milliLiter(s) (100 mL/Hr) IV Continuous <Continuous>  dextrose 5%. 1000 milliLiter(s) (50 mL/Hr) IV Continuous <Continuous>  dextrose 50% Injectable 25 Gram(s) IV Push once  dextrose 50% Injectable 12.5 Gram(s) IV Push once  dextrose 50% Injectable 25 Gram(s) IV Push once  epoetin reilly-epbx (RETACRIT) Injectable 87920 Unit(s) IV Push <User Schedule>  ferrous    sulfate Liquid 300 milliGRAM(s) Enteral Tube daily  glucagon  Injectable 1 milliGRAM(s) IntraMuscular once  heparin  Infusion 1100 Unit(s)/Hr (11.5 mL/Hr) IV Continuous <Continuous>  hydrALAZINE 25 milliGRAM(s) Oral three times a day  insulin lispro (ADMELOG) corrective regimen sliding scale   SubCutaneous every 6 hours  insulin NPH human recombinant 16 Unit(s) SubCutaneous <User Schedule>  Nephro-noam 1 Tablet(s) Oral daily  pantoprazole   Suspension 40 milliGRAM(s) Oral before breakfast    MEDICATIONS  (PRN):  dextrose Oral Gel 15 Gram(s) Oral once PRN Blood Glucose LESS THAN 70 milliGRAM(s)/deciliter  sodium chloride 0.9% Bolus. 100 milliLiter(s) IV Bolus every 5 minutes PRN SBP LESS THAN or EQUAL to 80 mmHg      LABS:                        9.6    12.02 )-----------( 273      ( 25 Feb 2025 01:30 )             31.0     02-25    132[L]  |  93[L]  |  35[H]  ----------------------------<  168[H]  4.2   |  22  |  1.78[H]    Ca    8.8      25 Feb 2025 01:30  Phos  2.7     02-25  Mg     2.70     02-25      PTT - ( 25 Feb 2025 01:30 )  PTT:80.7 sec  Urinalysis Basic - ( 25 Feb 2025 01:30 )    Color: x / Appearance: x / SG: x / pH: x  Gluc: 168 mg/dL / Ketone: x  / Bili: x / Urobili: x   Blood: x / Protein: x / Nitrite: x   Leuk Esterase: x / RBC: x / WBC x   Sq Epi: x / Non Sq Epi: x / Bacteria: x

## 2025-02-25 NOTE — PROGRESS NOTE ADULT - ASSESSMENT
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 Patent recently admitted in 6/2024 for left pontine and cerebellar CVA requiring tracheostomy. While at [facility] patient decannulated and passed SS with advanced diet to easy to chew with thin liquids, but complicated COVID requiring transfer to [facility] in 7/2024 and then ultimately discharged home. Per wife patient was doing well at home until 10/2024 when he was found with RLL with concern for aspiration requiring intubation, then tracheostomy, and ultimately discharged to NH with vent dependence in 11/2024. Patient now presented for RUE fistulogram and angioplasty with vascular, however course complicated by leukocytosis with concern for recurrent trachitis vs PNA and UTI, AOCD and hyperglycemia. Admitted to RCU for further management. Found with  PSA trachitis/ PNA and enterococcus faecium UTI. Course complicated by RUE brachial DVT and hypoxia with HD with concern for volume down vs PE?      NEUROLOGY  # Poor mentation second to metabolic encephalopathy vs psychosomatic/ depression   - Hx of L cerebellar and pontene embolic CVA x 2 with residual R hemiplegia   - AOx0, bedbound, and nonverbal at baseline per report, however per exam patient able to open eyes, able to follow commands on left (LUE>LLE) with SEVERE weakness, however noted with R hemiplegia per baseline  - Nods yes and no occasionally however keeps eyes closed likely psychosomatic vs metabolic encephalopathy with infections?   - Last CTH in 10/2024 with no acute findings   - Hold [email or web address] CT for now   - Monitor mentation     CARDIOVASCULAR  # Hx of HTN and HLD  - Continue on hydral 25 and norvasc 10   - Monitor BP     # Incidental Pericardial effusion   - Incidental small pericardial effusion seen adjacent to right ventricle, however IVC appears flat and LE without edema with low concern for RV failure.   - Prior TTE reviewed from 4/2024 with normal RVLVSF with no pericardial effusion noted at the time.   - TTE 2/23 with EF 65, normal LVRVSF, mild LAD, normal RA, mild pulmonary hypertension, and small pericardial effusion noted adjacent to the anterior right ventricle with no echocardiographic evidence of tamponade  - Monitor hemodynamics     RESPIRATORY  # Respiratory failure second to PNA vs volume down vs PE?   - Hx of COPD with chronic respiratory failure with tracheostomy and vent dependence  - Admitted for angioplasty of RUE AVF, however course complicated by leukocytosis with concern for recurrent trachitis/ PNA.   - Course complicated by hypoxia during HD likely volume down vs migration of RUE brachial DVT with PE?   - Held volume removal, bolus given, and hypoxia improved.   - Continue on heparin GTT for now  - CTA CHEST preliminary without PE, fu final reading   - Continue on vent 15/400/50  - Hold PS for now  - Continue on nebs     HEENT   # Hemoptysis   - Wife reported hemoptysis while at nursing home 2 weeks prior to admission   - No hemoptysis noted on admission   - CTA CHEST preliminary without PE, fu final reading   - Monitor for now    GI  # Dysphagia with PEG   - Passed SS with advanced diet to easy to chew with thin liquids in 7/2024, however since recurrent aspiration in 10/2024 now with PEG/ vent dependence   - Continue on PEG TF   - Monitor BMs    RENAL  # ESRD on HD MTTHF   # AVF trouble  # Dehydration   - Presented for RUE fistulogram and angioplasty with vascular on 2/18   - Resumed on HD with no flow issue from AVF   - Course complicated by hypoxia with concern for volume down given small IVC and elevated lactate on 2/21  - Volume removal held, lactate improved, and hypoxia improved.   - Monitor volume status and continue on HD MTTF per renal   - Monitor renal function, electrolytes and UOP     # Hyponatremia likely volume down   - Concern for volume issues, however overall appears volume down with serum osmo 307  - Sodium 128 and improved with HD/ bolus during HD.   - Trend sodium with HD for now     # Elevated lactate   - Lactate elevated likely second to volume down?   - Post bolus lactate trending down from 3.3 to 2.7 to 1.3    INFECTIOUS DISEASE  # Trachitis vs PNA/ UTI   - Hx of steno and PSA in sputum   - Flu/ COVID negative   - MRSA negative   - UCx with enterococcus   - SCx with  PSA   - Found with leukocytosis and started on cefepime and christiano (2/19) and vanco on HD days (2/21).   - No further steno seen in sputum and continue on cefepime (2/19 - 2/25) and vanco on HD days (2/21, 2/22 - 2/25)   - WBC increased likely reactive to hypoxic event and now improving  - ID following     # PPX   - MRSA PCR negative  - Candida auris PCR POSITIVE  - Continue on isolation precautions per IC    HEME  # AOCD   - Presented for angioplasty and found with anemia to 6.7 s/p 1U PRBC 2/19   - Anemia panel with concern for AOCD   - Recurrent anemia while on heparin GTT, however no overt signs of bleeding (gastric lavage clear and stool brown) and PTT never therapeutic.   - s/p 1/2U PRBC on 2/23 and monitor HH   - Continue on EPO QIW and Fe     # Heparin resistance?   - PTT persistently low despite increasing heparin GTT   - Resume on heparin GTT and check anti-Xa levels with PTT   - Anti Xa level supra therapeutic on 2/24, heparin GTT held and dose reduced, no active signs of bleeding or distress  - Continue to monitor Anti Xa level Q6 hours until therapeutic x 2 and then monitor Q24 hours    VASCULAR  # RUE DVT   - RUE edema noted with age-indeterminate, non-occlusive deep vein thrombosis noted in the right brachial vein.   - Case discussed with vascular who recommends full AC   - CTH from prior with encephalomalcia, however no hx of intracranial bleed   - Prior UGIB while on AC, however discharged on eliquis in 7/2024 and completed 6 month course for RLE DVT   - Continue on heparin GTT for now   - Case discussed with Wife who is apprehensive to full AC given bleeding hx, however she wishes for further investigation of pulmonary embolism and if positive she is amendable to risk of full anticoagulation. IF no PE found wife would like discuss further and possibly monitor off of anticoagulation.    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 Patent recently admitted in 6/2024 for left pontine and cerebellar CVA requiring tracheostomy. While at [facility] patient decannulated and passed SS with advanced diet to easy to chew with thin liquids, but complicated COVID requiring transfer to [facility] in 7/2024 and then ultimately discharged home. Per wife patient was doing well at home until 10/2024 when he was found with RLL with concern for aspiration requiring intubation, then tracheostomy, and ultimately discharged to NH with vent dependence in 11/2024. Patient now presented for RUE fistulogram and angioplasty with vascular, however course complicated by leukocytosis with concern for recurrent trachitis vs PNA and UTI, AOCD and hyperglycemia. Admitted to RCU for further management. Found with  PSA trachitis/ PNA and enterococcus faecium UTI. Course complicated by RUE brachial DVT and hypoxia with HD with concern for volume down vs PE?      NEUROLOGY  # Poor mentation second to metabolic encephalopathy vs psychosomatic/ depression   - Hx of L cerebellar and pontene embolic CVA x 2 with residual R hemiplegia   - AOx0, bedbound, and nonverbal at baseline per report, however per exam patient able to open eyes, able to follow commands on left (LUE>LLE) with SEVERE weakness, however noted with R hemiplegia per baseline  - Nods yes and no occasionally however keeps eyes closed likely psychosomatic vs metabolic encephalopathy with infections?   - Last CTH in 10/2024 with no acute findings   - Hold [email or web address] CT for now   - Monitor mentation     CARDIOVASCULAR  # Hx of HTN and HLD  - Continue on hydral 25 and norvasc 10   - Monitor BP     # Incidental Pericardial effusion   - Incidental small pericardial effusion seen adjacent to right ventricle, however IVC appears flat and LE without edema with low concern for RV failure.   - Prior TTE reviewed from 4/2024 with normal RVLVSF with no pericardial effusion noted at the time.   - TTE 2/23 with EF 65, normal LVRVSF, mild LAD, normal RA, mild pulmonary hypertension, and small pericardial effusion noted adjacent to the anterior right ventricle with no echocardiographic evidence of tamponade  - Monitor hemodynamics     RESPIRATORY  # Respiratory failure second to PNA vs volume down vs PE?   - Hx of COPD with chronic respiratory failure with tracheostomy and vent dependence  - Admitted for angioplasty of RUE AVF, however course complicated by leukocytosis with concern for recurrent trachitis/ PNA.   - Course complicated by hypoxia during HD likely volume down vs migration of RUE brachial DVT with PE?   - Held volume removal, bolus given, and hypoxia improved.   - Continue on heparin GTT for now  - CTA CHEST without PE  - Continue on vent 15/400/50  - Hold PS for now  - Continue on nebs     HEENT   # Hemoptysis   - Wife reported hemoptysis while at nursing home 2 weeks prior to admission   - No hemoptysis noted on admission   - CTA CHEST without PE  - Monitor for now    GI  # Dysphagia with PEG   - Passed SS with advanced diet to easy to chew with thin liquids in 7/2024, however since recurrent aspiration in 10/2024 now with PEG/ vent dependence   - Continue on PEG TF   - Monitor BMs    RENAL  # ESRD on HD MTTHF   # AVF trouble  # Dehydration   - Presented for RUE fistulogram and angioplasty with vascular on 2/18   - Resumed on HD with no flow issue from AVF   - Course complicated by hypoxia with concern for volume down given small IVC and elevated lactate on 2/21  - Volume removal held, lactate improved, and hypoxia improved.   - Monitor volume status and continue on HD MTTF per renal   - Monitor renal function, electrolytes and UOP     # Hyponatremia likely volume down   - Concern for volume issues, however overall appears volume down with serum osmo 307  - Sodium 128 and improved with HD/ bolus during HD.   - Trend sodium with HD for now     # Elevated lactate   - Lactate elevated likely second to volume down?   - Post bolus lactate trending down from 3.3 to 2.7 to 1.3    INFECTIOUS DISEASE  # Trachitis vs PNA/ UTI   - Hx of steno and PSA in sputum   - Flu/ COVID negative   - MRSA negative   - UCx with enterococcus   - SCx with  PSA   - Found with leukocytosis and started on cefepime and christiano (2/19) and vanco on HD days (2/21).   - No further steno seen in sputum and continue on cefepime (2/19 - 2/25) and vanco on HD days (2/21, 2/22 - 2/25)   - WBC increased likely reactive to hypoxic event and now improving  - ID following     # PPX   - MRSA PCR negative  - Candida auris PCR POSITIVE  - Continue on isolation precautions per IC    HEME  # AOCD   - Presented for angioplasty and found with anemia to 6.7 s/p 1U PRBC 2/19   - Anemia panel with concern for AOCD   - Recurrent anemia while on heparin GTT, however no overt signs of bleeding (gastric lavage clear and stool brown) and PTT never therapeutic.   - s/p 1/2U PRBC on 2/23 and monitor HH   - Continue on EPO QIW and Fe     # Heparin resistance?   - PTT persistently low despite increasing heparin GTT   - Resume on heparin GTT and check anti-Xa levels with PTT   - Anti Xa level supra therapeutic on 2/24, heparin GTT held and dose reduced, no active signs of bleeding or distress  - Continue to monitor Anti Xa level Q6 hours until therapeutic x 2 and then monitor Q24 hours    VASCULAR  # RUE DVT   - RUE edema noted with age-indeterminate, non-occlusive deep vein thrombosis noted in the right brachial vein.   - Case discussed with vascular who recommends full AC   - CTH from prior with encephalomalcia, however no hx of intracranial bleed   - Prior UGIB while on AC, however discharged on eliquis in 7/2024 and completed 6 month course for RLE DVT   - Continue on heparin GTT for now   - Case discussed with Wife who is apprehensive to full AC given bleeding hx, however she wishes for further investigation of pulmonary embolism and if positive she is amendable to risk of full anticoagulation. IF no PE found wife would like discuss further and possibly monitor off of anticoagulation.    ENDOCRINE  # DM2 A1C 14.0 (1/2024) > 6.4 (2/2025)  - Presented with hyperglycemia and continued on lantus and ISS   - Increase lantus to 28U QHS and ISS, however FS continues to trend in 200s   - Adjust insulin to NPH 16U Q6H (1200, 1800 and 0000) with tube feeds (2578-6696)    - Monitor FS    ETHICS/ GOC    - FULL CODE     DISPO - Back to NH when able

## 2025-02-26 LAB
ANION GAP SERPL CALC-SCNC: 20 MMOL/L — HIGH (ref 7–14)
BASOPHILS # BLD AUTO: 0.04 K/UL — SIGNIFICANT CHANGE UP (ref 0–0.2)
BASOPHILS NFR BLD AUTO: 0.3 % — SIGNIFICANT CHANGE UP (ref 0–2)
BUN SERPL-MCNC: 51 MG/DL — HIGH (ref 7–23)
CALCIUM SERPL-MCNC: 8.7 MG/DL — SIGNIFICANT CHANGE UP (ref 8.4–10.5)
CHLORIDE SERPL-SCNC: 85 MMOL/L — LOW (ref 98–107)
CO2 SERPL-SCNC: 24 MMOL/L — SIGNIFICANT CHANGE UP (ref 22–31)
EGFR: 26 ML/MIN/1.73M2 — LOW
EOSINOPHIL NFR BLD AUTO: 1.8 % — SIGNIFICANT CHANGE UP (ref 0–6)
GLUCOSE BLDC GLUCOMTR-MCNC: 101 MG/DL — HIGH (ref 70–99)
GLUCOSE BLDC GLUCOMTR-MCNC: 241 MG/DL — HIGH (ref 70–99)
GLUCOSE BLDC GLUCOMTR-MCNC: 326 MG/DL — HIGH (ref 70–99)
GLUCOSE BLDC GLUCOMTR-MCNC: 39 MG/DL — CRITICAL LOW (ref 70–99)
GLUCOSE BLDC GLUCOMTR-MCNC: 40 MG/DL — CRITICAL LOW (ref 70–99)
GLUCOSE SERPL-MCNC: 348 MG/DL — HIGH (ref 70–99)
GRAM STN FLD: ABNORMAL
HCT VFR BLD CALC: 27.7 % — LOW (ref 39–50)
HGB BLD-MCNC: 8 G/DL — LOW (ref 13–17)
HGB BLD-MCNC: 8.7 G/DL — LOW (ref 13–17)
IANC: 10.87 K/UL — HIGH (ref 1.8–7.4)
IMM GRANULOCYTES NFR BLD AUTO: 0.5 % — SIGNIFICANT CHANGE UP (ref 0–0.9)
LYMPHOCYTES # BLD AUTO: 0.94 K/UL — LOW (ref 1–3.3)
LYMPHOCYTES # BLD AUTO: 7.2 % — LOW (ref 13–44)
MAGNESIUM SERPL-MCNC: 2.8 MG/DL — HIGH (ref 1.6–2.6)
MCHC RBC-ENTMCNC: 23.7 PG — LOW (ref 27–34)
MCHC RBC-ENTMCNC: 24.2 PG — LOW (ref 27–34)
MCHC RBC-ENTMCNC: 31.4 G/DL — LOW (ref 32–36)
MCHC RBC-ENTMCNC: 32.1 G/DL — SIGNIFICANT CHANGE UP (ref 32–36)
MCV RBC AUTO: 75.5 FL — LOW (ref 80–100)
MCV RBC AUTO: 75.5 FL — LOW (ref 80–100)
MONOCYTES # BLD AUTO: 0.92 K/UL — HIGH (ref 0–0.9)
MONOCYTES NFR BLD AUTO: 7 % — SIGNIFICANT CHANGE UP (ref 2–14)
NEUTROPHILS # BLD AUTO: 10.87 K/UL — HIGH (ref 1.8–7.4)
NEUTROPHILS NFR BLD AUTO: 83.2 % — HIGH (ref 43–77)
NRBC # BLD AUTO: 0.03 K/UL — HIGH (ref 0–0)
NRBC # BLD AUTO: 0.04 K/UL — HIGH (ref 0–0)
NRBC # FLD: 0.03 K/UL — HIGH (ref 0–0)
NRBC # FLD: 0.04 K/UL — HIGH (ref 0–0)
NRBC BLD AUTO-RTO: 0 /100 WBCS — SIGNIFICANT CHANGE UP (ref 0–0)
NRBC BLD AUTO-RTO: 0 /100 WBCS — SIGNIFICANT CHANGE UP (ref 0–0)
PHOSPHATE SERPL-MCNC: 2.7 MG/DL — SIGNIFICANT CHANGE UP (ref 2.5–4.5)
PLATELET # BLD AUTO: 251 K/UL — SIGNIFICANT CHANGE UP (ref 150–400)
PLATELET # BLD AUTO: 262 K/UL — SIGNIFICANT CHANGE UP (ref 150–400)
POTASSIUM SERPL-MCNC: 3.9 MMOL/L — SIGNIFICANT CHANGE UP (ref 3.5–5.3)
POTASSIUM SERPL-SCNC: 3.9 MMOL/L — SIGNIFICANT CHANGE UP (ref 3.5–5.3)
RBC # BLD: 3.3 M/UL — LOW (ref 4.2–5.8)
RBC # BLD: 3.67 M/UL — LOW (ref 4.2–5.8)
RBC # FLD: 18.5 % — HIGH (ref 10.3–14.5)
RBC # FLD: 18.5 % — HIGH (ref 10.3–14.5)
SODIUM SERPL-SCNC: 129 MMOL/L — LOW (ref 135–145)
SPECIMEN SOURCE: SIGNIFICANT CHANGE UP
UFH PPP CHRO-ACNC: 1.09 IU/ML — CRITICAL HIGH (ref 0.3–0.7)
WBC # BLD: 13.08 K/UL — HIGH (ref 3.8–10.5)
WBC # BLD: 13.63 K/UL — HIGH (ref 3.8–10.5)
WBC # FLD AUTO: 13.08 K/UL — HIGH (ref 3.8–10.5)
WBC # FLD AUTO: 13.63 K/UL — HIGH (ref 3.8–10.5)

## 2025-02-26 PROCEDURE — 31645 BRNCHSC W/THER ASPIR 1ST: CPT

## 2025-02-26 PROCEDURE — 99233 SBSQ HOSP IP/OBS HIGH 50: CPT | Mod: FS,25

## 2025-02-26 RX ORDER — LORAZEPAM 4 MG/ML
1 VIAL (ML) INJECTION ONCE
Refills: 0 | Status: DISCONTINUED | OUTPATIENT
Start: 2025-02-26 | End: 2025-02-26

## 2025-02-26 RX ORDER — EPOETIN ALFA 10000 [IU]/ML
10000 SOLUTION INTRAVENOUS; SUBCUTANEOUS
Refills: 0 | Status: DISCONTINUED | OUTPATIENT
Start: 2025-02-26 | End: 2025-03-19

## 2025-02-26 RX ORDER — HYDROMORPHONE/SOD CHLOR,ISO/PF 2 MG/10 ML
1 SYRINGE (ML) INJECTION ONCE
Refills: 0 | Status: DISCONTINUED | OUTPATIENT
Start: 2025-02-26 | End: 2025-02-26

## 2025-02-26 RX ORDER — WHITE PETROLATUM 1 G/G
1 OINTMENT TOPICAL
Refills: 0 | Status: DISCONTINUED | OUTPATIENT
Start: 2025-02-26 | End: 2025-03-19

## 2025-02-26 RX ORDER — LIDOCAINE HCL/PF 10 MG/ML
3 VIAL (ML) INJECTION ONCE
Refills: 0 | Status: COMPLETED | OUTPATIENT
Start: 2025-02-26 | End: 2025-02-26

## 2025-02-26 RX ORDER — DEXTROSE 50 % IN WATER 50 %
25 SYRINGE (ML) INTRAVENOUS ONCE
Refills: 0 | Status: COMPLETED | OUTPATIENT
Start: 2025-02-26 | End: 2025-02-26

## 2025-02-26 RX ORDER — LIDOCAINE HCL/EPINEPHRINE/PF 1 %-1:200K
3 AMPUL (ML) INJECTION ONCE
Refills: 0 | Status: DISCONTINUED | OUTPATIENT
Start: 2025-02-26 | End: 2025-02-26

## 2025-02-26 RX ADMIN — Medication 25 MILLIGRAM(S): at 05:29

## 2025-02-26 RX ADMIN — Medication 3 MILLILITER(S): at 14:05

## 2025-02-26 RX ADMIN — Medication 1 APPLICATION(S): at 11:43

## 2025-02-26 RX ADMIN — Medication 1 MILLIGRAM(S): at 13:58

## 2025-02-26 RX ADMIN — Medication 25 MILLIGRAM(S): at 12:02

## 2025-02-26 RX ADMIN — Medication 15 MILLILITER(S): at 17:57

## 2025-02-26 RX ADMIN — ATORVASTATIN CALCIUM 20 MILLIGRAM(S): 80 TABLET, FILM COATED ORAL at 21:21

## 2025-02-26 RX ADMIN — Medication 25 MILLIGRAM(S): at 21:21

## 2025-02-26 RX ADMIN — Medication 40 MILLIGRAM(S): at 05:29

## 2025-02-26 RX ADMIN — INSULIN LISPRO 2: 100 INJECTION, SOLUTION INTRAVENOUS; SUBCUTANEOUS at 11:42

## 2025-02-26 RX ADMIN — Medication 1 MILLIGRAM(S): at 14:36

## 2025-02-26 RX ADMIN — WHITE PETROLATUM 1 APPLICATION(S): 1 OINTMENT TOPICAL at 05:52

## 2025-02-26 RX ADMIN — Medication 16 UNIT(S): at 11:43

## 2025-02-26 RX ADMIN — EPOETIN ALFA 10000 UNIT(S): 10000 SOLUTION INTRAVENOUS; SUBCUTANEOUS at 15:09

## 2025-02-26 RX ADMIN — IPRATROPIUM BROMIDE AND ALBUTEROL SULFATE 3 MILLILITER(S): .5; 2.5 SOLUTION RESPIRATORY (INHALATION) at 08:22

## 2025-02-26 RX ADMIN — INSULIN LISPRO 4: 100 INJECTION, SOLUTION INTRAVENOUS; SUBCUTANEOUS at 05:45

## 2025-02-26 RX ADMIN — Medication 1 MILLIGRAM(S): at 14:03

## 2025-02-26 RX ADMIN — Medication 300 MILLIGRAM(S): at 11:45

## 2025-02-26 RX ADMIN — Medication 12 UNIT(S): at 17:55

## 2025-02-26 RX ADMIN — IPRATROPIUM BROMIDE AND ALBUTEROL SULFATE 3 MILLILITER(S): .5; 2.5 SOLUTION RESPIRATORY (INHALATION) at 15:11

## 2025-02-26 RX ADMIN — WHITE PETROLATUM 1 APPLICATION(S): 1 OINTMENT TOPICAL at 17:56

## 2025-02-26 RX ADMIN — Medication 25 GRAM(S): at 23:28

## 2025-02-26 RX ADMIN — IPRATROPIUM BROMIDE AND ALBUTEROL SULFATE 3 MILLILITER(S): .5; 2.5 SOLUTION RESPIRATORY (INHALATION) at 03:44

## 2025-02-26 RX ADMIN — Medication 15 MILLILITER(S): at 05:29

## 2025-02-26 RX ADMIN — AMLODIPINE BESYLATE 10 MILLIGRAM(S): 10 TABLET ORAL at 05:29

## 2025-02-26 RX ADMIN — Medication 1 TABLET(S): at 11:45

## 2025-02-26 RX ADMIN — IPRATROPIUM BROMIDE AND ALBUTEROL SULFATE 3 MILLILITER(S): .5; 2.5 SOLUTION RESPIRATORY (INHALATION) at 21:25

## 2025-02-26 NOTE — PROGRESS NOTE ADULT - ASSESSMENT
73 y/o M PMhx trach/vent (last changed 10/23/24), CVA x2 with residual R hemiplegia, COPD, ESRD on HD MWF who presented to ED for leukocytosis when initially planned for fistulogram and noted to have a WBC of 18.     VAP, leukocytosis  SARS-CoV-2/ RSV/ flu PCR negative  MRSA PCR negative  CXR- Right lower lobe infiltrate  blood cultures- NGTD  resp cx w/  pseudomonas  leukocytosis likely multi-factorial 2/2 DVT, infection    UTI- treated  UA w/ significant pyuria though noted epithelial cells indicating contamination  unable to assess urinary symptoms given patient's mentation  repeat urine cx also w/ E faecium  s/p vanc course, completed 2/25    DVT  RUE US- age-indeterminate, non-occlusive deep vein thrombosis noted in the right brachial vein  on heparin gtt    ESRD  HD per nephrology    informed possible plans for bronchoscopy for hemoptysis  may be due to heparin gtt  Recommendations  s/p cefepime x 7 days, completed 2/25  monitor off antibiotics  aspiration precautions  contact isolation for candida auris    Stveen Torres M.D.  [facility]  [phone]  After 5pm on weekdays and all day on weekends - please call [phone]  Available on [employer] teams    Thank you for consulting us and involving us in the management of this patients case. In addition to reviewing history, imaging, documents, labs, microbiology, took into account antibiotic stewardship, local antibiogram and infection control strategies and potential transmission issues.

## 2025-02-26 NOTE — PROGRESS NOTE ADULT - SUBJECTIVE AND OBJECTIVE BOX
CHIEF COMPLAINT: Patient is a 74y old  Male who presents with a chief complaint of leukocytosis (20 Feb 2025 13:41)      INTERVAL EVENTS: no overnight events     ROS: Seen by bedside during AM rounds     OBJECTIVE:  ICU Vital Signs Last 24 Hrs  T(C): 36.6 (26 Feb 2025 04:00), Max: 36.6 (25 Feb 2025 20:00)  T(F): 97.8 (26 Feb 2025 04:00), Max: 97.8 (25 Feb 2025 20:00)  HR: 74 (26 Feb 2025 08:29) (65 - 82)  BP: 148/61 (26 Feb 2025 04:00) (138/61 - 156/73)  BP(mean): --  ABP: --  ABP(mean): --  RR: 16 (26 Feb 2025 04:00) (15 - 16)  SpO2: 100% (26 Feb 2025 08:29) (100% - 100%)    O2 Parameters below as of 26 Feb 2025 08:29  Patient On (Oxygen Delivery Method): ventilator          Mode: AC/ CMV (Assist Control/ Continuous Mandatory Ventilation), RR (machine): 15, TV (machine): 400, FiO2: 40, PEEP: 5, ITime: 0.65, MAP: 11, PIP: 30    02-25 @ 07:01  -  02-26 @ 07:00  --------------------------------------------------------  IN: 1388 mL / OUT: 0 mL / NET: 1388 mL      CAPILLARY BLOOD GLUCOSE      POCT Blood Glucose.: 326 mg/dL (26 Feb 2025 05:33)    PHYSICAL EXAM:  General: NAD  Neck: +trach to ventilator, neck supple, no JVD  Respiratory: +coarse breath sounds b/l, no wheezes, no resp distress  Cardiovascular:  normal s1, s2, regular rate and rhythm  Abdomen: soft, non tender, +PEG  Extremities: no LE edema b/l, in SCDs  Skin: warm, dry  Neurological: AAO x 0, eyes closed but opens minimally to verbal stimuli, +withdraws to noxious stimuli, does not follow commands   Psychiatry: no agitation     Mode: AC/ CMV (Assist Control/ Continuous Mandatory Ventilation)  RR (machine): 15  TV (machine): 400  FiO2: 40  PEEP: 5  ITime: 0.65  MAP: 11  PIP: 30      HOSPITAL MEDICATIONS:  MEDICATIONS  (STANDING):  albuterol/ipratropium for Nebulization 3 milliLiter(s) Nebulizer every 6 hours  amLODIPine   Tablet 10 milliGRAM(s) Oral daily  AQUAPHOR (petrolatum Ointment) 1 Application(s) Topical two times a day  atorvastatin 20 milliGRAM(s) Oral at bedtime  cefepime   IVPB 1000 milliGRAM(s) IV Intermittent every 24 hours  chlorhexidine 0.12% Liquid 15 milliLiter(s) Oral Mucosa every 12 hours  chlorhexidine 2% Cloths 1 Application(s) Topical daily  dextrose 5%. 1000 milliLiter(s) (100 mL/Hr) IV Continuous <Continuous>  dextrose 5%. 1000 milliLiter(s) (50 mL/Hr) IV Continuous <Continuous>  dextrose 50% Injectable 25 Gram(s) IV Push once  dextrose 50% Injectable 12.5 Gram(s) IV Push once  dextrose 50% Injectable 25 Gram(s) IV Push once  epoetin reilly-epbx (RETACRIT) Injectable 33393 Unit(s) IV Push <User Schedule>  ferrous    sulfate Liquid 300 milliGRAM(s) Enteral Tube daily  glucagon  Injectable 1 milliGRAM(s) IntraMuscular once  heparin  Infusion 1100 Unit(s)/Hr (11.5 mL/Hr) IV Continuous <Continuous>  hydrALAZINE 25 milliGRAM(s) Oral three times a day  insulin lispro (ADMELOG) corrective regimen sliding scale   SubCutaneous every 6 hours  insulin NPH human recombinant 16 Unit(s) SubCutaneous <User Schedule>  Nephro-noam 1 Tablet(s) Oral daily  pantoprazole   Suspension 40 milliGRAM(s) Oral before breakfast    MEDICATIONS  (PRN):  dextrose Oral Gel 15 Gram(s) Oral once PRN Blood Glucose LESS THAN 70 milliGRAM(s)/deciliter  sodium chloride 0.9% Bolus. 100 milliLiter(s) IV Bolus every 5 minutes PRN SBP LESS THAN or EQUAL to 80 mmHg      LABS:                        8.0    13.08 )-----------( 251      ( 26 Feb 2025 05:43 )             24.9     02-26    129[L]  |  85[L]  |  51[H]  ----------------------------<  348[H]  3.9   |  24  |  2.59[H]    Ca    8.7      26 Feb 2025 05:43  Phos  2.7     02-26  Mg     2.80     02-26      PTT - ( 25 Feb 2025 01:30 )  PTT:80.7 sec  Urinalysis Basic - ( 26 Feb 2025 05:43 )    Color: x / Appearance: x / SG: x / pH: x  Gluc: 348 mg/dL / Ketone: x  / Bili: x / Urobili: x   Blood: x / Protein: x / Nitrite: x   Leuk Esterase: x / RBC: x / WBC x   Sq Epi: x / Non Sq Epi: x / Bacteria: x

## 2025-02-26 NOTE — PROGRESS NOTE ADULT - ASSESSMENT
74-year-old male hx trach/vent - , CVA, COPD, stage renal disease on dialysis 4 times a week (MTRF) history of pressure ulcer.  Patient presents the ED today for elevated white count. nephrology consulted for dialysis needs    ESRD:  [facility]  On HD MTTsF via an A-V fistula. s/p fistulogram by vascular 2/18  Continue MWF in hospital  consent from wife in dialysis unit  s/p hd 2/24 uf 2L hd tmr  monitor bmp    htn  fluctuating; suboptimal   continue norvasc 10 daily and hydralazine 25 tid  max uf as tolerated  up titrate hydralazine if sbp >150 persistently  monitor    Anemia:  Transfuse for Hg < 7.  s/p 1 unit PRBC 2/23  epo with hd  iron sat >20  monitor    leukocytosis  sepsis work up per team    hyponatremia  in setting of esrd and hyperglycemia  optimize dm control  hd per schedule with uF  monitor   74-year-old male hx trach/vent - , CVA, COPD, stage renal disease on dialysis 4 times a week (MTRF) history of pressure ulcer.  Patient presents the ED today for elevated white count. nephrology consulted for dialysis needs    [facility]RD:  [facility]  On HD MTTsF via an A-V fistula. s/p fistulogram by vascular 2/18  Continue MWF in hospital  consent from wife in dialysis unit  s/p hd 2/24 uf 2L hd today  monitor bmp    htn  better  continue norvasc 10 daily and hydralazine 25 tid  max uf as tolerated  monitor    Anemia:  Transfuse for Hg < 7.  s/p 1 unit PRBC 2/23  epo with hd  iron sat >20  monitor    leukocytosis  sepsis work up per team    hyponatremia  in setting of esrd and hyperglycemia  optimize dm control  hd per schedule with uF  monitor

## 2025-02-26 NOTE — PROGRESS NOTE ADULT - SUBJECTIVE AND OBJECTIVE BOX
[facility]   MD ELIANE BHAGAT MD ANGELA WONG, PA    TEL:  OFFICE: [phone]  From 5pm-7am Answering Service [phone]    -- RENAL FOLLOW UP NOTE ---Date of Service 02-26-25 @ 15:14    Patient is a 74y old  Male who presents with a chief complaint of leukocytosis (20 Feb 2025 13:41)      Patient seen and examined at bedside. No chest pain/sob    VITALS:  T(F): 97.5 (02-26-25 @ 14:30), Max: 97.8 (02-25-25 @ 20:00)  HR: 72 (02-26-25 @ 15:13)  BP: 141/62 (02-26-25 @ 14:30)  RR: 15 (02-26-25 @ 14:30)  SpO2: 100% (02-26-25 @ 15:13)  Wt(kg): --    02-25 @ 07:01  -  02-26 @ 07:00  --------------------------------------------------------  IN: 1388 mL / OUT: 0 mL / NET: 1388 mL          PHYSICAL EXAM:  General: NAD  Neck: No JVD  Respiratory: CTAB, no wheezes, rales or rhonchi  Cardiovascular: S1, S2, RRR  Gastrointestinal: BS+, soft, NT/ND  Extremities: No peripheral edema    Hospital Medications:   MEDICATIONS  (STANDING):  albuterol/ipratropium for Nebulization 3 milliLiter(s) Nebulizer every 6 hours  amLODIPine   Tablet 10 milliGRAM(s) Oral daily  AQUAPHOR (petrolatum Ointment) 1 Application(s) Topical two times a day  atorvastatin 20 milliGRAM(s) Oral at bedtime  chlorhexidine 0.12% Liquid 15 milliLiter(s) Oral Mucosa every 12 hours  chlorhexidine 2% Cloths 1 Application(s) Topical daily  dextrose 5%. 1000 milliLiter(s) (100 mL/Hr) IV Continuous <Continuous>  dextrose 5%. 1000 milliLiter(s) (50 mL/Hr) IV Continuous <Continuous>  dextrose 50% Injectable 25 Gram(s) IV Push once  dextrose 50% Injectable 12.5 Gram(s) IV Push once  dextrose 50% Injectable 25 Gram(s) IV Push once  epoetin reilly-epbx (RETACRIT) Injectable 04375 Unit(s) IV Push <User Schedule>  ferrous    sulfate Liquid 300 milliGRAM(s) Enteral Tube daily  glucagon  Injectable 1 milliGRAM(s) IntraMuscular once  hydrALAZINE 25 milliGRAM(s) Oral three times a day  insulin lispro (ADMELOG) corrective regimen sliding scale   SubCutaneous every 6 hours  insulin NPH human recombinant 16 Unit(s) SubCutaneous <User Schedule>  Nephro-noam 1 Tablet(s) Oral daily  pantoprazole   Suspension 40 milliGRAM(s) Oral before breakfast      LABS:  02-26    129[L]  |  85[L]  |  51[H]  ----------------------------<  348[H]  3.9   |  24  |  2.59[H]    Ca    8.7      26 Feb 2025 05:43  Phos  2.7     02-26  Mg     2.80     02-26      Creatinine Trend: 2.59 <--, 1.78 <--, 3.10 <--, 2.49 <--, 1.65 <--, 1.39 <--, 2.88 <--, 2.16 <--    Phosphorus: 2.7 mg/dL (02-26 @ 05:43)                              8.7    13.63 )-----------( 262      ( 26 Feb 2025 12:20 )             27.7     Urine Studies:  Urinalysis - [02-26-25 @ 05:43]      Color  / Appearance  / SG  / pH       Gluc 348 / Ketone   / Bili  / Urobili        Blood  / Protein  / Leuk Est  / Nitrite       RBC  / WBC  / Hyaline  / Gran  / Sq Epi  / Non Sq Epi  / Bacteria       Iron 46, TIBC 142, %sat 32      [02-19-25 @ 11:39]  Ferritin 3601      [02-19-25 @ 11:39]  PTH -- (Ca --)      [05-26-24 @ 01:20]   122  TSH 1.660      [10-05-24 @ 08:37]  Lipid: chol --, , HDL --, LDL --      [10-18-24 @ 06:00]    HBsAb 654.8      [02-19-25 @ 19:05]  HBsAg Nonreact      [02-19-25 @ 19:05]  HBcAb Nonreact      [02-19-25 @ 19:05]  HCV 0.19, Nonreact      [02-19-25 @ 19:05]      RADIOLOGY & ADDITIONAL STUDIES:   [facility]   MD ELIANE BHAGAT MD ANGELA WONG, PA    TEL:  OFFICE: [phone]  From 5pm-7am Answering Service [phone]    -- RENAL FOLLOW UP NOTE ---Date of Service 02-26-25 @ 15:14    Patient is a 74y old  Male who presents with a chief complaint of leukocytosis (20 Feb 2025 13:41)      Patient seen and examined at bedside.    VITALS:  T(F): 97.5 (02-26-25 @ 14:30), Max: 97.8 (02-25-25 @ 20:00)  HR: 72 (02-26-25 @ 15:13)  BP: 141/62 (02-26-25 @ 14:30)  RR: 15 (02-26-25 @ 14:30)  SpO2: 100% (02-26-25 @ 15:13)  Wt(kg): --    02-25 @ 07:01  -  02-26 @ 07:00  --------------------------------------------------------  IN: 1388 mL / OUT: 0 mL / NET: 1388 mL          PHYSICAL EXAM:  General: NAD., eyes open does not follow command   Neck: +trach  Respiratory: + rhonchi  Cardiovascular: S1, S2, RRR  Gastrointestinal:+peg  Extremities: No peripheral edema    Hospital Medications:   MEDICATIONS  (STANDING):  albuterol/ipratropium for Nebulization 3 milliLiter(s) Nebulizer every 6 hours  amLODIPine   Tablet 10 milliGRAM(s) Oral daily  AQUAPHOR (petrolatum Ointment) 1 Application(s) Topical two times a day  atorvastatin 20 milliGRAM(s) Oral at bedtime  chlorhexidine 0.12% Liquid 15 milliLiter(s) Oral Mucosa every 12 hours  chlorhexidine 2% Cloths 1 Application(s) Topical daily  dextrose 5%. 1000 milliLiter(s) (100 mL/Hr) IV Continuous <Continuous>  dextrose 5%. 1000 milliLiter(s) (50 mL/Hr) IV Continuous <Continuous>  dextrose 50% Injectable 25 Gram(s) IV Push once  dextrose 50% Injectable 12.5 Gram(s) IV Push once  dextrose 50% Injectable 25 Gram(s) IV Push once  epoetin reilly-epbx (RETACRIT) Injectable 68627 Unit(s) IV Push <User Schedule>  ferrous    sulfate Liquid 300 milliGRAM(s) Enteral Tube daily  glucagon  Injectable 1 milliGRAM(s) IntraMuscular once  hydrALAZINE 25 milliGRAM(s) Oral three times a day  insulin lispro (ADMELOG) corrective regimen sliding scale   SubCutaneous every 6 hours  insulin NPH human recombinant 16 Unit(s) SubCutaneous <User Schedule>  Nephro-noam 1 Tablet(s) Oral daily  pantoprazole   Suspension 40 milliGRAM(s) Oral before breakfast      LABS:  02-26    129[L]  |  85[L]  |  51[H]  ----------------------------<  348[H]  3.9   |  24  |  2.59[H]    Ca    8.7      26 Feb 2025 05:43  Phos  2.7     02-26  Mg     2.80     02-26      Creatinine Trend: 2.59 <--, 1.78 <--, 3.10 <--, 2.49 <--, 1.65 <--, 1.39 <--, 2.88 <--, 2.16 <--    Phosphorus: 2.7 mg/dL (02-26 @ 05:43)                              8.7    13.63 )-----------( 262      ( 26 Feb 2025 12:20 )             27.7     Urine Studies:  Urinalysis - [02-26-25 @ 05:43]      Color  / Appearance  / SG  / pH       Gluc 348 / Ketone   / Bili  / Urobili        Blood  / Protein  / Leuk Est  / Nitrite       RBC  / WBC  / Hyaline  / Gran  / Sq Epi  / Non Sq Epi  / Bacteria       Iron 46, TIBC 142, %sat 32      [02-19-25 @ 11:39]  Ferritin 3601      [02-19-25 @ 11:39]  PTH -- (Ca --)      [05-26-24 @ 01:20]   122  TSH 1.660      [10-05-24 @ 08:37]  Lipid: chol --, , HDL --, LDL --      [10-18-24 @ 06:00]    HBsAb 654.8      [02-19-25 @ 19:05]  HBsAg Nonreact      [02-19-25 @ 19:05]  HBcAb Nonreact      [02-19-25 @ 19:05]  HCV 0.19, Nonreact      [02-19-25 @ 19:05]      RADIOLOGY & ADDITIONAL STUDIES:

## 2025-02-26 NOTE — PROCEDURE NOTE - NSBRONCHFINDINGS_GEN_A_CORE_FT
Thick, copious secretions noted throughout airway but particularly in LLL, along with old, clotted dark blood found in that site only. No lesions, masses, or bleeding seen in airway or dripping from upper airway. Trach in good position and gatito sharp.

## 2025-02-26 NOTE — PROCEDURE NOTE - NSBRONCHPROCDETAILS_GEN_A_CORE_FT
Bronchoscope inserted into trach, trach in appropriate position and gatito visualized. No bleeding or oozing noted proximally. Entire tarcheobronchial tree examined to subsegmental level -- no lesions or masses or overt bleeding. Thick, copious secretions noted throughout the airway, moreso in LLL, thick secretions suctioned in LLL and noted old, dark blood clots in that location, not found elsewhere. BAL performed in LLL, 60cc. Bronchoscope then removed. Bronchoscope inserted into trach, trach in appropriate position and gatito visualized. No bleeding or oozing noted proximally. Entire tracheobronchial tree examined to subsegmental level -- no lesions or masses or overt bleeding. Thick, copious secretions noted throughout the airway, more so in LLL, thick secretions suctioned in LLL and noted old, dark blood clots in that location, not found elsewhere. BAL performed in LLL, 60cc. Bronchoscope then removed.

## 2025-02-26 NOTE — PROGRESS NOTE ADULT - ASSESSMENT
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 Patent recently admitted in 6/2024 for left pontine and cerebellar CVA requiring tracheostomy. While at [facility] patient decannulated and passed SS with advanced diet to easy to chew with thin liquids, but complicated COVID requiring transfer to [facility] in 7/2024 and then ultimately discharged home. Per wife patient was doing well at home until 10/2024 when he was found with RLL with concern for aspiration requiring intubation, then tracheostomy, and ultimately discharged to NH with vent dependence in 11/2024. Patient now presented for RUE fistulogram and angioplasty with vascular, however course complicated by leukocytosis with concern for recurrent trachitis vs PNA and UTI, AOCD and hyperglycemia. Admitted to [facility] for further management. Found with  PSA trachitis/ PNA and enterococcus faecium UTI. Course complicated by RUE brachial DVT and hypoxia with HD with concern for volume down vs PE?      NEUROLOGY  # Poor mentation second to metabolic encephalopathy vs psychosomatic/ depression   - Hx of L cerebellar and pontene embolic CVA x 2 with residual R hemiplegia   - AOx0, bedbound, and nonverbal at baseline per report, however per exam patient able to open eyes, able to follow commands on left (LUE>LLE) with SEVERE weakness, however noted with R hemiplegia per baseline  - Nods yes and no occasionally however keeps eyes closed likely psychosomatic vs metabolic encephalopathy with infections?   - Last CTH in 10/2024 with no acute findings   - Hold [facility] CT for now   - Monitor mentation     CARDIOVASCULAR  # Hx of HTN and HLD  - Continue on hydral 25 and norvasc 10   - Monitor BP     # Incidental Pericardial effusion   - Incidental small pericardial effusion seen adjacent to right ventricle, however IVC appears flat and LE without edema with low concern for RV failure.   - Prior TTE reviewed from 4/2024 with normal RVLVSF with no pericardial effusion noted at the time.   - TTE 2/23 with EF 65, normal LVRVSF, mild LAD, normal RA, mild pulmonary hypertension, and small pericardial effusion noted adjacent to the anterior right ventricle with no echocardiographic evidence of tamponade  - Monitor hemodynamics     RESPIRATORY  # Respiratory failure second to PNA vs volume down vs PE?   - Hx of COPD with chronic respiratory failure with tracheostomy and vent dependence  - Admitted for angioplasty of RUE AVF, however course complicated by leukocytosis with concern for recurrent trachitis/ PNA.   - Course complicated by hypoxia during HD likely volume down vs migration of RUE brachial DVT with PE?   - Held volume removal, bolus given, and hypoxia improved.   - Continue on heparin GTT for now  - CTA CHEST without PE  - Continue on vent 15/400/50  - Hold PS for now  - Continue on nebs     HEENT   # Hemoptysis   - Wife reported hemoptysis while at nursing home 2 weeks prior to admission   - No hemoptysis noted on admission   - CTA CHEST without PE  - Monitor for now    GI  # Dysphagia with PEG   - Passed SS with advanced diet to easy to chew with thin liquids in 7/2024, however since recurrent aspiration in 10/2024 now with PEG/ vent dependence   - Continue on PEG TF   - Monitor BMs    RENAL  # ESRD on HD MTTHF   # AVF trouble  # Dehydration   - Presented for RUE fistulogram and angioplasty with vascular on 2/18   - Resumed on HD with no flow issue from AVF   - Course complicated by hypoxia with concern for volume down given small IVC and elevated lactate on 2/21  - Volume removal held, lactate improved, and hypoxia improved.   - Monitor volume status and continue on HD MTTF per renal   - Monitor renal function, electrolytes and UOP     # Hyponatremia likely volume down   - Concern for volume issues, however overall appears volume down with serum osmo 307  - Sodium 128 and improved with HD/ bolus during HD.   - Trend sodium with HD for now     # Elevated lactate   - Lactate elevated likely second to volume down?   - Post bolus lactate trending down from 3.3 to 2.7 to 1.3    INFECTIOUS DISEASE  # Trachitis vs PNA/ UTI   - Hx of steno and PSA in sputum   - Flu/ COVID negative   - MRSA negative   - UCx with enterococcus   - SCx with  PSA   - Found with leukocytosis and started on cefepime and christiano (2/19) and vanco on HD days (2/21).   - No further steno seen in sputum and continue on cefepime (2/19 - 2/25) and vanco on HD days (2/21, 2/22 - 2/25)   - WBC increased likely reactive to hypoxic event and now improving  - ID following     # PPX   - MRSA PCR negative  - Candida auris PCR POSITIVE  - Continue on isolation precautions per IC    HEME  # AOCD   - Presented for angioplasty and found with anemia to 6.7 s/p 1U PRBC 2/19   - Anemia panel with concern for AOCD   - Recurrent anemia while on heparin GTT, however no overt signs of bleeding (gastric lavage clear and stool brown) and PTT never therapeutic.   - s/p 1/2U PRBC on 2/23 and monitor HH   - Continue on EPO QIW and Fe     # Heparin resistance?   - PTT persistently low despite increasing heparin GTT   - Resume on heparin GTT and check anti-Xa levels with PTT   - Anti Xa level supra therapeutic on 2/24, heparin GTT held and dose reduced, no active signs of bleeding or distress  - Continue to monitor Anti Xa level Q6 hours until therapeutic x 2 and then monitor Q24 hours    VASCULAR  # RUE DVT   - RUE edema noted with age-indeterminate, non-occlusive deep vein thrombosis noted in the right brachial vein.   - Case discussed with vascular who recommends full AC   - CTH from prior with encephalomalcia, however no hx of intracranial bleed   - Prior UGIB while on AC, however discharged on eliquis in 7/2024 and completed 6 month course for RLE DVT   - Continue on heparin GTT for now   - Case discussed with Wife who is apprehensive to full AC given bleeding hx, however she wishes for further investigation of pulmonary embolism and if positive she is amendable to risk of full anticoagulation. IF no PE found wife would like discuss further and possibly monitor off of anticoagulation.    ENDOCRINE  # DM2 A1C 14.0 (1/2024) > 6.4 (2/2025)  - Presented with hyperglycemia and continued on lantus and ISS   - Increase lantus to 28U QHS and ISS, however FS continues to trend in 200s   - Adjust insulin to NPH 16U Q6H (1200, 1800 and 0000) with tube feeds (4195-3614)    - Monitor FS    ETHICS/ GOC    - FULL CODE     DISPO - Back to NH when able

## 2025-02-26 NOTE — PROCEDURE NOTE - NSBRONCHCOMMENTS_GEN_A_CORE_FT
-f/up BAL cell ct, bacterial and fungal cxs Implemented All Universal Safety Interventions:  [place] to call system. Call bell, personal items and telephone within reach. Instruct patient to call for assistance. Room bathroom lighting operational. Non-slip footwear when patient is off stretcher. Physically safe environment: no spills, clutter or unnecessary equipment. Stretcher in lowest position, wheels locked, appropriate side rails in place.

## 2025-02-26 NOTE — PROGRESS NOTE ADULT - NS ATTEND AMEND GEN_ALL_CORE FT
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      Pt p/w acute encephalopathy i/s/o previously known left talya and left cerebellum ischemic infarct (no vessel occlusion/stenosis) with residual right hemiplegia. Pt remains obtunded, but intermittently answers appropriately to questions/basic commands. W/u for secondary encephalopathy otherwise NTD. Continue on NOAC and statin. PT/OT following.     Pt with acute combined hypoxemic and hypercapnic respiratory failure s/p ETT intubation/MV with failure to wean from ventilator requiring repeat tracheostomy placement (1st time IP 5/14/24~7/2024 with successful decannulation, 2nd time OSH ~10/2024). Pt now vent dependent, will attempt weaning trials as tolerated. Trach care and suctioning as per RCU team. Oral hygiene and aspiration precautions in place. Wean O2 supplementation for goal O2 saturation 90-95%. Airway clearance therapy in place. CTA performed 2/24 2/2 acute hypoxemic i/s/o known VTEs (-) PE but with significant mucous burden with associated atelectasis. IPV added to regimen. Overnight pt with 2 episodes of hemoptysis with blood clots suctioned i/s/o supratherapeutic heparin drip. Heparin held, bedside bronchoscopy performed. No evidence of active bleeding or DAH but pt with profuse thick mucous in multiple lobes, especially on left side. BAL sent for cultures.    Pt with oropharyngeal dysphagia s/p PEG placement now tolerating feeds at goal rate. Pt with previous hospitalization with concern for melena on 5/26, now stable with no further events. Will CTM carefully while on full A/C. PPI QD. CBC q24hr. Bowel regimen prn. Pt further developed new pressure ulcers found on hospital arrival, likely while at LTCD. Wound consult placed, recs appreciated.    Pt with ESRD via right AVF found to have stenosis requiring temporary access on previous admission s/p RUE fistuloplasty (6/4) now returning for RUE fistulogram and angioplasty (2/18) but found to have severe hyperglycemia and leukocytosis requiring admission. Right brachial vein noted to have acute DVT. A/C initiated with heparin drip, but with persistently subtherapeutic pTTs found to have high anti-Xa levels concerning for heparin resistance, heparin now decreased with repeat anti-Xa level initially therapeutic but today supratherapeutic again.     Dispo pending medical optimization. Pt full code. DVT ppx in place. Pt found on this admission to have (+) candida auris colonization (as per wife, pt's neighbor at LTCF was recently found to have it and pt was moved out of room at that time.) Will continue to update family and discuss plan of care throughout hospitalization, discussed at length with pt's wife and daughter at bedside today.

## 2025-02-26 NOTE — ADVANCED PRACTICE NURSE CONSULT - REASON FOR CONSULT
Attempted to see patient on Wound Care Rounds, patient having bedside procedure done by team at this time. Will follow up with patient for wound care recommendations.

## 2025-02-26 NOTE — PROGRESS NOTE ADULT - SUBJECTIVE AND OBJECTIVE BOX
[facility]  AUGUST Carbajal Y. Patel, S. Shah, G. Casimir  [phone]  ([phone] - weekdays after 5pm and weekends)    Name: JIMMY BLAND  Age/Gender: 74y Male  MRN: [id number]    Interval History:  Notes reviewed.   Afebrile.   remains on vent via trach    Allergies: No Known Allergies      Objective:  Vitals:   T(F): 97.8 (02-26-25 @ 08:00), Max: 97.8 (02-25-25 @ 20:00)  HR: 78 (02-26-25 @ 10:46) (65 - 82)  BP: 145/58 (02-26-25 @ 08:00) (138/61 - 156/73)  RR: 17 (02-26-25 @ 08:00) (15 - 17)  SpO2: 100% (02-26-25 @ 10:46) (100% - 100%)  Physical Examination:  General: frail appearing  HEENT: anicteric, tracheostomy, on vent  Cardio: S1, S2, normal rate  Resp: decreased breath sounds  Abd: Soft. Nondistended. PEG  Ext: no LE edema  Skin: warm, dry    Laboratory Studies:  CBC:                       8.0    13.08 )-----------( 251      ( 26 Feb 2025 05:43 )             24.9     WBC Trend:  13.08 02-26-25 @ 05:43  12.02 02-25-25 @ 01:30  13.52 02-24-25 @ 06:20  14.46 02-23-25 @ 15:06  15.39 02-23-25 @ 06:30  14.31 02-23-25 @ 05:20  20.02 02-22-25 @ 10:07  14.43 02-21-25 @ 04:50  11.40 02-20-25 @ 06:00  13.53 02-19-25 @ 18:00    CMP: 02-26    129[L]  |  85[L]  |  51[H]  ----------------------------<  348[H]  3.9   |  24  |  2.59[H]    Ca    8.7      26 Feb 2025 05:43  Phos  2.7     02-26  Mg     2.80     02-26          Vancomycin Level, Random: 14.3 ug/mL (02-24-25 @ 06:20)  Vancomycin Level, Trough: 10.7 ug/mL (02-21-25 @ 21:21)    Urinalysis Basic - ( 26 Feb 2025 05:43 )    Color: x / Appearance: x / SG: x / pH: x  Gluc: 348 mg/dL / Ketone: x  / Bili: x / Urobili: x   Blood: x / Protein: x / Nitrite: x   Leuk Esterase: x / RBC: x / WBC x   Sq Epi: x / Non Sq Epi: x / Bacteria: x      Microbiology: reviewed     Culture - Urine (collected 02-20-25 @ 18:00)  Source: Clean Catch Clean Catch (Midstream)  Final Report (02-23-25 @ 08:45):    >100,000 CFU/ml Enterococcus faecium  Organism: Enterococcus faecium (02-23-25 @ 08:45)  Organism: Enterococcus faecium (02-23-25 @ 08:45)      Method Type: VIDA      -  Ampicillin: R >8 Predicts results to ampicillin/sulbactam, amoxacillin-clavulanate and  piperacillin-tazobactam.      -  Ciprofloxacin: R >2      -  Levofloxacin: R >4      -  Nitrofurantoin: S <=32 Should not be used to treat pyelonephritis.      -  Tetracycline: R >8      -  Vancomycin: S 0.5    Culture - INF Candida auris (collected 02-19-25 @ 18:46)  Source: Nares/Axilla/Groin Nares/Axilla/Groin  Final Report (02-22-25 @ 21:59):    Culture POSITIVE for Candida auris, susceptibilities not performed for    this specimen type.    This surveillance culture is intended for Infection Control purposes only.    Culture - Sputum (collected 02-19-25 @ 15:54)  Source: Trach Asp Tracheal Aspirate  Gram Stain (02-19-25 @ 22:37):    Numerous polymorphonuclear leukocytes per low power field    Rare Squamous epithelial cells per low power field    Numerous Gram Negative Rods seen per oil power field    Few Gram Positive Rods seen per oil power field  Final Report (02-21-25 @ 21:20):    Few Pseudomonas aeruginosa (Carbapenem Resistant)    Commensal dominick consistent with body site  Organism: Pseudomonas aeruginosa (Carbapenem Resistant) (02-21-25 @ 21:20)  Organism: Pseudomonas aeruginosa (Carbapenem Resistant) (02-21-25 @ 21:20)      Method Type: CarbaR      -  Resistance Gene to Carbapenem: Nondet  Organism: Pseudomonas aeruginosa (Carbapenem Resistant) (02-21-25 @ 21:20)      Method Type: VIDA      -  Aztreonam: R >16      -  Cefepime: S 8      -  Ceftazidime: I 16      -  Ceftazidime/Avibactam: S <=4      -  Ceftolozane/tazobactam: S <=2      -  Ciprofloxacin: S <=0.25      -  Imipenem: R >8      -  Levofloxacin: S <=0.5      -  Meropenem: R >8      -  Piperacillin/Tazobactam: R 64    Culture - Urine (collected 02-18-25 @ 18:03)  Source: Clean Catch Clean Catch (Midstream)  Final Report (02-20-25 @ 15:06):    >100,000 CFU/ml Enterococcus faecium  Organism: Enterococcus faecium (02-20-25 @ 15:06)  Organism: Enterococcus faecium (02-20-25 @ 15:06)      Method Type: VIDA      -  Ampicillin: R >8 Predicts results to ampicillin/sulbactam, amoxacillin-clavulanate and  piperacillin-tazobactam.      -  Ciprofloxacin: R >2      -  Levofloxacin: R >4      -  Nitrofurantoin: S <=32 Should not be used to treat pyelonephritis.      -  Tetracycline: R >8      -  Vancomycin: S 0.5    Culture - Blood (collected 02-18-25 @ 16:05)  Source: .Blood Blood-Venous  Final Report (02-23-25 @ 22:01):    No growth at 5 days    Culture - Blood (collected 02-18-25 @ 16:00)  Source: .Blood Blood-Peripheral  Final Report (02-23-25 @ 22:01):    No growth at 5 days        Radiology: reviewed     Medications:  albuterol/ipratropium for Nebulization 3 milliLiter(s) Nebulizer every 6 hours  amLODIPine   Tablet 10 milliGRAM(s) Oral daily  AQUAPHOR (petrolatum Ointment) 1 Application(s) Topical two times a day  atorvastatin 20 milliGRAM(s) Oral at bedtime  cefepime   IVPB 1000 milliGRAM(s) IV Intermittent every 24 hours  chlorhexidine 0.12% Liquid 15 milliLiter(s) Oral Mucosa every 12 hours  chlorhexidine 2% Cloths 1 Application(s) Topical daily  dextrose 5%. 1000 milliLiter(s) IV Continuous <Continuous>  dextrose 5%. 1000 milliLiter(s) IV Continuous <Continuous>  dextrose 50% Injectable 25 Gram(s) IV Push once  dextrose 50% Injectable 12.5 Gram(s) IV Push once  dextrose 50% Injectable 25 Gram(s) IV Push once  dextrose Oral Gel 15 Gram(s) Oral once PRN  EPINEPHrine     1 mG/mL Injectable 0.3 milliGRAM(s) IntraMuscular once PRN  epoetin reilly-epbx (RETACRIT) Injectable 43613 Unit(s) IV Push <User Schedule>  ferrous    sulfate Liquid 300 milliGRAM(s) Enteral Tube daily  glucagon  Injectable 1 milliGRAM(s) IntraMuscular once  heparin  Infusion 1100 Unit(s)/Hr IV Continuous <Continuous>  hydrALAZINE 25 milliGRAM(s) Oral three times a day  HYDROmorphone  Injectable 1 milliGRAM(s) IV Push once PRN  insulin lispro (ADMELOG) corrective regimen sliding scale   SubCutaneous every 6 hours  insulin NPH human recombinant 16 Unit(s) SubCutaneous <User Schedule>  LORazepam   Injectable 1 milliGRAM(s) IV Push once PRN  Nephro-noam 1 Tablet(s) Oral daily  pantoprazole   Suspension 40 milliGRAM(s) Oral before breakfast  sodium chloride 0.9% Bolus. 100 milliLiter(s) IV Bolus every 5 minutes PRN    Antimicrobials:  cefepime   IVPB 1000 milliGRAM(s) IV Intermittent every 24 hours

## 2025-02-27 LAB
ANION GAP SERPL CALC-SCNC: 14 MMOL/L — SIGNIFICANT CHANGE UP (ref 7–14)
BASOPHILS # BLD AUTO: 0.05 K/UL — SIGNIFICANT CHANGE UP (ref 0–0.2)
BASOPHILS NFR BLD AUTO: 0.3 % — SIGNIFICANT CHANGE UP (ref 0–2)
BUN SERPL-MCNC: 37 MG/DL — HIGH (ref 7–23)
CALCIUM SERPL-MCNC: 9 MG/DL — SIGNIFICANT CHANGE UP (ref 8.4–10.5)
CO2 SERPL-SCNC: 28 MMOL/L — SIGNIFICANT CHANGE UP (ref 22–31)
CREAT SERPL-MCNC: 2.03 MG/DL — HIGH (ref 0.5–1.3)
EGFR: 34 ML/MIN/1.73M2 — LOW
EGFR: 34 ML/MIN/1.73M2 — LOW
EOSINOPHIL # BLD AUTO: 0.15 K/UL — SIGNIFICANT CHANGE UP (ref 0–0.5)
EOSINOPHIL NFR BLD AUTO: 0.8 % — SIGNIFICANT CHANGE UP (ref 0–6)
GLUCOSE BLDC GLUCOMTR-MCNC: 130 MG/DL — HIGH (ref 70–99)
GLUCOSE BLDC GLUCOMTR-MCNC: 148 MG/DL — HIGH (ref 70–99)
GLUCOSE BLDC GLUCOMTR-MCNC: 196 MG/DL — HIGH (ref 70–99)
GLUCOSE BLDC GLUCOMTR-MCNC: 203 MG/DL — HIGH (ref 70–99)
GLUCOSE BLDC GLUCOMTR-MCNC: 238 MG/DL — HIGH (ref 70–99)
GLUCOSE SERPL-MCNC: 141 MG/DL — HIGH (ref 70–99)
HCT VFR BLD CALC: 32.1 % — LOW (ref 39–50)
HGB BLD-MCNC: 9.8 G/DL — LOW (ref 13–17)
IANC: 15.56 K/UL — HIGH (ref 1.8–7.4)
IMM GRANULOCYTES NFR BLD AUTO: 0.6 % — SIGNIFICANT CHANGE UP (ref 0–0.9)
LYMPHOCYTES # BLD AUTO: 0.96 K/UL — LOW (ref 1–3.3)
LYMPHOCYTES # BLD AUTO: 5.3 % — LOW (ref 13–44)
MAGNESIUM SERPL-MCNC: 2.8 MG/DL — HIGH (ref 1.6–2.6)
MCHC RBC-ENTMCNC: 23.6 PG — LOW (ref 27–34)
MCHC RBC-ENTMCNC: 30.5 G/DL — LOW (ref 32–36)
MCV RBC AUTO: 77.3 FL — LOW (ref 80–100)
MONOCYTES # BLD AUTO: 1.15 K/UL — HIGH (ref 0–0.9)
MONOCYTES NFR BLD AUTO: 6.4 % — SIGNIFICANT CHANGE UP (ref 2–14)
NEUTROPHILS # BLD AUTO: 15.56 K/UL — HIGH (ref 1.8–7.4)
NEUTROPHILS NFR BLD AUTO: 86.6 % — HIGH (ref 43–77)
NRBC # BLD AUTO: 0.05 K/UL — HIGH (ref 0–0)
NRBC # FLD: 0.05 K/UL — HIGH (ref 0–0)
NRBC BLD AUTO-RTO: 0 /100 WBCS — SIGNIFICANT CHANGE UP (ref 0–0)
PHOSPHATE SERPL-MCNC: 2.1 MG/DL — LOW (ref 2.5–4.5)
PLATELET # BLD AUTO: 196 K/UL — SIGNIFICANT CHANGE UP (ref 150–400)
POTASSIUM SERPL-MCNC: 3.7 MMOL/L — SIGNIFICANT CHANGE UP (ref 3.5–5.3)
POTASSIUM SERPL-SCNC: 3.7 MMOL/L — SIGNIFICANT CHANGE UP (ref 3.5–5.3)
RBC # BLD: 4.15 M/UL — LOW (ref 4.2–5.8)
RBC # FLD: 19 % — HIGH (ref 10.3–14.5)
SODIUM SERPL-SCNC: 129 MMOL/L — LOW (ref 135–145)
WBC # BLD: 17.98 K/UL — HIGH (ref 3.8–10.5)
WBC # FLD AUTO: 17.98 K/UL — HIGH (ref 3.8–10.5)

## 2025-02-27 PROCEDURE — 99233 SBSQ HOSP IP/OBS HIGH 50: CPT | Mod: FS

## 2025-02-27 RX ORDER — HEPARIN SODIUM 1000 [USP'U]/ML
5000 INJECTION INTRAVENOUS; SUBCUTANEOUS EVERY 8 HOURS
Refills: 0 | Status: DISCONTINUED | OUTPATIENT
Start: 2025-02-27 | End: 2025-02-28

## 2025-02-27 RX ADMIN — IPRATROPIUM BROMIDE AND ALBUTEROL SULFATE 3 MILLILITER(S): .5; 2.5 SOLUTION RESPIRATORY (INHALATION) at 16:15

## 2025-02-27 RX ADMIN — Medication 15 MILLILITER(S): at 05:29

## 2025-02-27 RX ADMIN — HEPARIN SODIUM 5000 UNIT(S): 1000 INJECTION INTRAVENOUS; SUBCUTANEOUS at 22:47

## 2025-02-27 RX ADMIN — Medication 1 TABLET(S): at 11:39

## 2025-02-27 RX ADMIN — Medication 16 UNIT(S): at 17:04

## 2025-02-27 RX ADMIN — INSULIN LISPRO 2: 100 INJECTION, SOLUTION INTRAVENOUS; SUBCUTANEOUS at 17:04

## 2025-02-27 RX ADMIN — AMLODIPINE BESYLATE 10 MILLIGRAM(S): 10 TABLET ORAL at 05:29

## 2025-02-27 RX ADMIN — INSULIN LISPRO 2: 100 INJECTION, SOLUTION INTRAVENOUS; SUBCUTANEOUS at 11:43

## 2025-02-27 RX ADMIN — IPRATROPIUM BROMIDE AND ALBUTEROL SULFATE 3 MILLILITER(S): .5; 2.5 SOLUTION RESPIRATORY (INHALATION) at 20:46

## 2025-02-27 RX ADMIN — IPRATROPIUM BROMIDE AND ALBUTEROL SULFATE 3 MILLILITER(S): .5; 2.5 SOLUTION RESPIRATORY (INHALATION) at 11:06

## 2025-02-27 RX ADMIN — Medication 25 MILLIGRAM(S): at 05:29

## 2025-02-27 RX ADMIN — WHITE PETROLATUM 1 APPLICATION(S): 1 OINTMENT TOPICAL at 17:06

## 2025-02-27 RX ADMIN — Medication 25 MILLIGRAM(S): at 22:41

## 2025-02-27 RX ADMIN — Medication 15 MILLILITER(S): at 17:05

## 2025-02-27 RX ADMIN — WHITE PETROLATUM 1 APPLICATION(S): 1 OINTMENT TOPICAL at 05:30

## 2025-02-27 RX ADMIN — Medication 16 UNIT(S): at 11:42

## 2025-02-27 RX ADMIN — Medication 25 MILLIGRAM(S): at 12:33

## 2025-02-27 RX ADMIN — IPRATROPIUM BROMIDE AND ALBUTEROL SULFATE 3 MILLILITER(S): .5; 2.5 SOLUTION RESPIRATORY (INHALATION) at 04:27

## 2025-02-27 RX ADMIN — Medication 300 MILLIGRAM(S): at 11:39

## 2025-02-27 RX ADMIN — ATORVASTATIN CALCIUM 20 MILLIGRAM(S): 80 TABLET, FILM COATED ORAL at 22:39

## 2025-02-27 RX ADMIN — Medication 1 APPLICATION(S): at 11:54

## 2025-02-27 RX ADMIN — Medication 40 MILLIGRAM(S): at 06:22

## 2025-02-27 NOTE — PROGRESS NOTE ADULT - SUBJECTIVE AND OBJECTIVE BOX
CHIEF COMPLAINT: Patient is a 74y old  Male who presents with a chief complaint of leukocytosis (20 Feb 2025 13:41)      INTERVAL EVENTS: Hypoglycemic in evening - NPH held     ROS: Seen by bedside during AM rounds     OBJECTIVE:  ICU Vital Signs Last 24 Hrs  T(C): 36.4 (27 Feb 2025 04:00), Max: 36.8 (27 Feb 2025 00:00)  T(F): 97.6 (27 Feb 2025 04:00), Max: 98.2 (27 Feb 2025 00:00)  HR: 76 (27 Feb 2025 04:27) (69 - 89)  BP: 152/66 (27 Feb 2025 04:00) (129/65 - 161/69)  BP(mean): 89 (27 Feb 2025 04:00) (89 - 92)  ABP: --  ABP(mean): --  RR: 15 (27 Feb 2025 04:00) (15 - 17)  SpO2: 100% (27 Feb 2025 04:27) (100% - 100%)    O2 Parameters below as of 27 Feb 2025 04:00  Patient On (Oxygen Delivery Method): ventilator    O2 Concentration (%): 40      Mode: AC/ CMV (Assist Control/ Continuous Mandatory Ventilation), RR (machine): 15, TV (machine): 400, FiO2: 50, PEEP: 5, ITime: 0.66, MAP: 9, PIP: 20    02-26 @ 07:01  -  02-27 @ 07:00  --------------------------------------------------------  IN: 1660 mL / OUT: 2900 mL / NET: -1240 mL      CAPILLARY BLOOD GLUCOSE      POCT Blood Glucose.: 148 mg/dL (27 Feb 2025 05:39)    PHYSICAL EXAM:  General: NAD  Neck: +trach to ventilator, neck supple, no JVD  Respiratory: +coarse breath sounds b/l, no wheezes, no resp distress  Cardiovascular:  normal s1, s2, regular rate and rhythm  Abdomen: soft, non tender, +PEG  Extremities: no LE edema b/l, in SCDs  Skin: warm, dry  Neurological: Opens eyes, does not follow commands                     Psychiatry: no agitation     Mode: AC/ CMV (Assist Control/ Continuous Mandatory Ventilation)  RR (machine): 15  TV (machine): 400  FiO2: 50  PEEP: 5  ITime: 0.66  MAP: 9  PIP: 20      HOSPITAL MEDICATIONS:  MEDICATIONS  (STANDING):  albuterol/ipratropium for Nebulization 3 milliLiter(s) Nebulizer every 6 hours  amLODIPine   Tablet 10 milliGRAM(s) Oral daily  AQUAPHOR (petrolatum Ointment) 1 Application(s) Topical two times a day  atorvastatin 20 milliGRAM(s) Oral at bedtime  chlorhexidine 0.12% Liquid 15 milliLiter(s) Oral Mucosa every 12 hours  chlorhexidine 2% Cloths 1 Application(s) Topical daily  dextrose 5%. 1000 milliLiter(s) (100 mL/Hr) IV Continuous <Continuous>  dextrose 5%. 1000 milliLiter(s) (50 mL/Hr) IV Continuous <Continuous>  dextrose 50% Injectable 25 Gram(s) IV Push once  dextrose 50% Injectable 12.5 Gram(s) IV Push once  dextrose 50% Injectable 25 Gram(s) IV Push once  epoetin reilly-epbx (RETACRIT) Injectable 23444 Unit(s) IV Push <User Schedule>  ferrous    sulfate Liquid 300 milliGRAM(s) Enteral Tube daily  glucagon  Injectable 1 milliGRAM(s) IntraMuscular once  hydrALAZINE 25 milliGRAM(s) Oral three times a day  insulin lispro (ADMELOG) corrective regimen sliding scale   SubCutaneous every 6 hours  insulin NPH human recombinant 16 Unit(s) SubCutaneous <User Schedule>  Nephro-noam 1 Tablet(s) Oral daily  pantoprazole   Suspension 40 milliGRAM(s) Oral before breakfast    MEDICATIONS  (PRN):  dextrose Oral Gel 15 Gram(s) Oral once PRN Blood Glucose LESS THAN 70 milliGRAM(s)/deciliter  EPINEPHrine     1 mG/mL Injectable 0.3 milliGRAM(s) IntraMuscular once PRN bronchocopy  sodium chloride 0.9% Bolus. 100 milliLiter(s) IV Bolus every 5 minutes PRN SBP LESS THAN or EQUAL to 80 mmHg      LABS:                        9.8    17.98 )-----------( 196      ( 27 Feb 2025 05:00 )             32.1     02-27    129[L]  |  87[L]  |  37[H]  ----------------------------<  141[H]  3.7   |  28  |  2.03[H]    Ca    9.0      27 Feb 2025 05:00  Phos  2.1     02-27  Mg     2.80     02-27        Urinalysis Basic - ( 27 Feb 2025 05:00 )    Color: x / Appearance: x / SG: x / pH: x  Gluc: 141 mg/dL / Ketone: x  / Bili: x / Urobili: x   Blood: x / Protein: x / Nitrite: x   Leuk Esterase: x / RBC: x / WBC x   Sq Epi: x / Non Sq Epi: x / Bacteria: x

## 2025-02-27 NOTE — PROGRESS NOTE ADULT - NS ATTEND AMEND GEN_ALL_CORE FT
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 c/b acute hypoxemic and hypercapnic respiratory failure s/p ETT intubation/MV with failure to wean from ventilator s/p tracheostomy with hospital course further complicated by aspiration and B cereus bacteremia and RLE DVT followed by citrobacter PNA, GIB i/s/o AOCD, and fistula stenosis s/p fistulogram 6/4 ultimately transferred to acute rehab, decannulated, and discharged home until 10/2024 when pt returned to OSH with aspiration PNA c/b hypoxemic respiratory failure requiring ETT intubation/MV followed by tracheostomy placement and d/c to LTCF ([facility]) 11/2024 now presenting to [facility] on 2/18/25 for RUE fistulogram/angioplasty with vascular sx but with hospital course c/b concern fo recurrent PNA with uncontrolled hyperglycemia admitted to RCU for further medical management.      Pt p/w acute encephalopathy i/s/o previously known left talya and left cerebellum ischemic infarct (no vessel occlusion/stenosis) with residual right hemiplegia. Pt remains obtunded, but intermittently answers appropriately to questions/basic commands. W/u for secondary encephalopathy otherwise NTD. Continue on NOAC and statin. PT/OT following.     Pt with acute combined hypoxemic and hypercapnic respiratory failure s/p ETT intubation/MV with failure to wean from ventilator requiring repeat tracheostomy placement (1st time IP 5/14/24~7/2024 with successful decannulation, 2nd time OSH ~10/2024). Pt now vent dependent, will attempt weaning trials as tolerated. Trach care and suctioning as per RCU team. Oral hygiene and aspiration precautions in place. Wean O2 supplementation for goal O2 saturation 90-95%. Airway clearance therapy in place. CTA performed 2/24 2/2 acute hypoxemic i/s/o known VTEs (-) PE but with significant mucous burden with associated atelectasis. IPV added to regimen. Overnight pt with 2 episodes of hemoptysis with blood clots suctioned i/s/o supratherapeutic heparin drip. Heparin held, bedside bronchoscopy performed. No evidence of active bleeding or DAH but pt with profuse thick mucous in multiple lobes, especially on left side. BAL sent for cultures. Today ventilator compliance improving, will c/w current treatment. No further episodes of hemoptysis.    Pt with oropharyngeal dysphagia s/p PEG placement now tolerating feeds at goal rate. Pt with previous hospitalization with concern for melena on 5/26, now stable with no further events. Will CTM carefully while on full A/C. PPI QD. CBC q24hr. Bowel regimen prn. Pt further developed new pressure ulcers found on hospital arrival, likely while at LTCD. Wound consult placed, recs appreciated.    Pt with ESRD via right AVF found to have stenosis requiring temporary access on previous admission s/p RUE fistuloplasty (6/4) now returning for RUE fistulogram and angioplasty (2/18) but found to have severe hyperglycemia and leukocytosis requiring admission. Right brachial vein noted to have acute DVT. A/C initiated with heparin drip, but with persistently subtherapeutic pTTs found to have high anti-Xa levels concerning for heparin resistance, now monitoring with anti-Xa levels. Trial of DVT ppx today.    Dispo pending medical optimization. Pt full code. DVT ppx in place. Pt found on this admission to have (+) candida auris colonization (as per wife, pt's neighbor at LTCF was recently found to have it and pt was moved out of room at that time.) Will continue to update family and discuss plan of care throughout hospitalization, discussed at length with pt's wife and daughter at bedside today.

## 2025-02-27 NOTE — PROGRESS NOTE ADULT - SUBJECTIVE AND OBJECTIVE BOX
[facility]  AUGUST Carbajal Y. Patel, S. Shah, G. Casimir  [phone]  ([phone] - weekdays after 5pm and weekends)    Name: JIMMY BLAND  Age/Gender: 74y Male  MRN: [id number]    Interval History:  Notes reviewed.   Afebrile.   hemoptysis yesterday  s/p bronchoscopy    Allergies: No Known Allergies      Objective:  Vitals:   T(F): 97.9 (02-27-25 @ 12:00), Max: 98.2 (02-27-25 @ 00:00)  HR: 90 (02-27-25 @ 12:00) (69 - 90)  BP: 146/57 (02-27-25 @ 12:00) (129/65 - 159/69)  RR: 16 (02-27-25 @ 12:00) (15 - 17)  SpO2: 100% (02-27-25 @ 12:00) (100% - 100%)  Physical Examination:  General: frail appearing  HEENT: anicteric, tracheostomy, on vent  Cardio: S1, S2, normal rate  Resp: decreased breath sounds  Abd: Soft. Nondistended. PEG  Ext: no LE edema  Skin: warm, dry    Laboratory Studies:  CBC:                       9.8    17.98 )-----------( 196      ( 27 Feb 2025 05:00 )             32.1     WBC Trend:  17.98 02-27-25 @ 05:00  13.63 02-26-25 @ 12:20  13.08 02-26-25 @ 05:43  12.02 02-25-25 @ 01:30  13.52 02-24-25 @ 06:20  14.46 02-23-25 @ 15:06  15.39 02-23-25 @ 06:30  14.31 02-23-25 @ 05:20  20.02 02-22-25 @ 10:07  14.43 02-21-25 @ 04:50    CMP: 02-27    129[L]  |  87[L]  |  37[H]  ----------------------------<  141[H]  3.7   |  28  |  2.03[H]    Ca    9.0      27 Feb 2025 05:00  Phos  2.1     02-27  Mg     2.80     02-27          Vancomycin Level, Random: 14.3 ug/mL (02-24-25 @ 06:20)    Urinalysis Basic - ( 27 Feb 2025 05:00 )    Color: x / Appearance: x / SG: x / pH: x  Gluc: 141 mg/dL / Ketone: x  / Bili: x / Urobili: x   Blood: x / Protein: x / Nitrite: x   Leuk Esterase: x / RBC: x / WBC x   Sq Epi: x / Non Sq Epi: x / Bacteria: x      Microbiology: reviewed     Culture - Fungal, Bronchial (collected 02-26-25 @ 16:43)  Source: Bronchial Bronchial Lavage  Preliminary Report (02-27-25 @ 11:46):    Testing in progress    Culture - Bronchial (collected 02-26-25 @ 16:43)  Source: Bronchial Bronchial Lavage  Gram Stain (02-26-25 @ 23:04):    Few polymorphonuclear leukocytes per low power field    No squamous epithelial cells    Few Gram Negative Rods per oil power field    Culture - Urine (collected 02-20-25 @ 18:00)  Source: Clean Catch Clean Catch (Midstream)  Final Report (02-23-25 @ 08:45):    >100,000 CFU/ml Enterococcus faecium  Organism: Enterococcus faecium (02-23-25 @ 08:45)  Organism: Enterococcus faecium (02-23-25 @ 08:45)      Method Type: VIDA      -  Ampicillin: R >8 Predicts results to ampicillin/sulbactam, amoxacillin-clavulanate and  piperacillin-tazobactam.      -  Ciprofloxacin: R >2      -  Levofloxacin: R >4      -  Nitrofurantoin: S <=32 Should not be used to treat pyelonephritis.      -  Tetracycline: R >8      -  Vancomycin: S 0.5    Culture - INF Candida auris (collected 02-19-25 @ 18:46)  Source: Nares/Axilla/Groin Nares/Axilla/Groin  Final Report (02-22-25 @ 21:59):    Culture POSITIVE for Candida auris, susceptibilities not performed for    this specimen type.    This surveillance culture is intended for Infection Control purposes only.    Culture - Sputum (collected 02-19-25 @ 15:54)  Source: Trach Asp Tracheal Aspirate  Gram Stain (02-19-25 @ 22:37):    Numerous polymorphonuclear leukocytes per low power field    Rare Squamous epithelial cells per low power field    Numerous Gram Negative Rods seen per oil power field    Few Gram Positive Rods seen per oil power field  Final Report (02-21-25 @ 21:20):    Few Pseudomonas aeruginosa (Carbapenem Resistant)    Commensal dominick consistent with body site  Organism: Pseudomonas aeruginosa (Carbapenem Resistant) (02-21-25 @ 21:20)  Organism: Pseudomonas aeruginosa (Carbapenem Resistant) (02-21-25 @ 21:20)      Method Type: CarbaR      -  Resistance Gene to Carbapenem: Nondet  Organism: Pseudomonas aeruginosa (Carbapenem Resistant) (02-21-25 @ 21:20)      Method Type: VIDA      -  Aztreonam: R >16      -  Cefepime: S 8      -  Ceftazidime: I 16      -  Ceftazidime/Avibactam: S <=4      -  Ceftolozane/tazobactam: S <=2      -  Ciprofloxacin: S <=0.25      -  Imipenem: R >8      -  Levofloxacin: S <=0.5      -  Meropenem: R >8      -  Piperacillin/Tazobactam: R 64    Culture - Urine (collected 02-18-25 @ 18:03)  Source: Clean Catch Clean Catch (Midstream)  Final Report (02-20-25 @ 15:06):    >100,000 CFU/ml Enterococcus faecium  Organism: Enterococcus faecium (02-20-25 @ 15:06)  Organism: Enterococcus faecium (02-20-25 @ 15:06)      Method Type: VIDA      -  Ampicillin: R >8 Predicts results to ampicillin/sulbactam, amoxacillin-clavulanate and  piperacillin-tazobactam.      -  Ciprofloxacin: R >2      -  Levofloxacin: R >4      -  Nitrofurantoin: S <=32 Should not be used to treat pyelonephritis.      -  Tetracycline: R >8      -  Vancomycin: S 0.5    Culture - Blood (collected 02-18-25 @ 16:05)  Source: .Blood Blood-Venous  Final Report (02-23-25 @ 22:01):    No growth at 5 days    Culture - Blood (collected 02-18-25 @ 16:00)  Source: .Blood Blood-Peripheral  Final Report (02-23-25 @ 22:01):    No growth at 5 days        Radiology: reviewed     Medications:  albuterol/ipratropium for Nebulization 3 milliLiter(s) Nebulizer every 6 hours  amLODIPine   Tablet 10 milliGRAM(s) Oral daily  AQUAPHOR (petrolatum Ointment) 1 Application(s) Topical two times a day  atorvastatin 20 milliGRAM(s) Oral at bedtime  chlorhexidine 0.12% Liquid 15 milliLiter(s) Oral Mucosa every 12 hours  chlorhexidine 2% Cloths 1 Application(s) Topical daily  dextrose 5%. 1000 milliLiter(s) IV Continuous <Continuous>  dextrose 5%. 1000 milliLiter(s) IV Continuous <Continuous>  dextrose 50% Injectable 25 Gram(s) IV Push once  dextrose 50% Injectable 12.5 Gram(s) IV Push once  dextrose 50% Injectable 25 Gram(s) IV Push once  dextrose Oral Gel 15 Gram(s) Oral once PRN  EPINEPHrine     1 mG/mL Injectable 0.3 milliGRAM(s) IntraMuscular once PRN  epoetin reilly-epbx (RETACRIT) Injectable 00126 Unit(s) IV Push <User Schedule>  ferrous    sulfate Liquid 300 milliGRAM(s) Enteral Tube daily  glucagon  Injectable 1 milliGRAM(s) IntraMuscular once  hydrALAZINE 25 milliGRAM(s) Oral three times a day  insulin lispro (ADMELOG) corrective regimen sliding scale   SubCutaneous every 6 hours  insulin NPH human recombinant 16 Unit(s) SubCutaneous <User Schedule>  Nephro-noam 1 Tablet(s) Oral daily  pantoprazole   Suspension 40 milliGRAM(s) Oral before breakfast  sodium chloride 0.9% Bolus. 100 milliLiter(s) IV Bolus every 5 minutes PRN    Antimicrobials:

## 2025-02-27 NOTE — PROGRESS NOTE ADULT - ASSESSMENT
75 y/o M PMhx trach/vent (last changed 10/23/24), CVA x2 with residual R hemiplegia, COPD, ESRD on HD MWF who presented to ED for leukocytosis when initially planned for fistulogram and noted to have a WBC of 18.     VAP, leukocytosis  SARS-CoV-2/ RSV/ flu PCR negative  MRSA PCR negative  CXR- Right lower lobe infiltrate  blood cultures- NGTD  resp cx w/  pseudomonas  s/p cefepime x 7 days, completed 2/25    UTI- treated  UA w/ significant pyuria though noted epithelial cells indicating contamination  unable to assess urinary symptoms given patient's mentation  repeat urine cx also w/ E faecium  s/p vanc course, completed 2/25    DVT  RUE US- age-indeterminate, non-occlusive deep vein thrombosis noted in the right brachial vein  on heparin gtt    ESRD  HD per nephrology    noted increase in leukocytosis  possibly reactive 2/2 hemoptysis  s/p bronchoscopy yesterday    Recommendations  monitor off antibiotics  aspiration precautions  contact isolation for candida auris    Steven Torres M.D.  [facility]  [phone]  After 5pm on weekdays and all day on weekends - please call [phone]  Available on [employer] teams    Thank you for consulting us and involving us in the management of this patients case. In addition to reviewing history, imaging, documents, labs, microbiology, took into account antibiotic stewardship, local antibiogram and infection control strategies and potential transmission issues.

## 2025-02-27 NOTE — PROVIDER CONTACT NOTE (HYPOGLYCEMIA EVENT) - NS PROVIDER CONTACT BACKGROUND-HYPO
Age: 74y    Gender: Male    POCT Blood Glucose:  130 mg/dL (02-27-25 @ 00:10)  40 mg/dL (02-26-25 @ 23:18)  39 mg/dL (02-26-25 @ 23:15)  101 mg/dL (02-26-25 @ 17:11)  241 mg/dL (02-26-25 @ 11:08)  326 mg/dL (02-26-25 @ 05:33)      eMAR:  atorvastatin   20 milliGRAM(s) Oral (02-26-25 @ 21:21)    dextrose 50% Injectable   25 Gram(s) IV Push (02-26-25 @ 23:28)    insulin lispro (ADMELOG) corrective regimen sliding scale   2 Unit(s) SubCutaneous (02-26-25 @ 11:42)   4 Unit(s) SubCutaneous (02-26-25 @ 05:45)    insulin NPH human recombinant   16 Unit(s) SubCutaneous (02-26-25 @ 11:43)    insulin NPH human recombinant   12 Unit(s) SubCutaneous (02-26-25 @ 17:55)

## 2025-02-27 NOTE — ADVANCED PRACTICE NURSE CONSULT - ASSESSMENT
General: A&Ox1, bedbound, turned with 2 assist, incontinent of stool and urine, condom catheter in place. Skin warm, dry with increased moisture in intertriginous folds, adequate skin turgor, scattered areas of hyperpigmentation and hypopigmentation, scattered areas of ecchymosis (without hematoma) on bilateral upper extremities. Blanchable erythema on bilateral heels. +tracheostomy, peristomal skin intact, with increased moisture. PEG with peritubular skin intact. Feedings paused for assessment.     Sacrum: stage 2 pressure injury 2.4cdc1obd9.2cm, 100% pink moist dermis, small serosanguinous drainage, no odor. Periwound with no erythema, no increased warmth, no edema, no induration, no fluctuance no signs or symptoms of overt skin infection. Goals of care: offload pressure, protect from friction and shear, monitor for tissue type changes.     Bilateral ischium with ulcers, initially presented as stage 2 pressure injuries, now complicated by non pressure skin failure r/t critical illness:  ---Left ischium 3cmx2.5cmx0.2cm, 70% pink moist dermis, 30% tan firmly adhered slough, scant serosanguinous drainage, no odor. Periwound with no erythema, no increased warmth, no edema, no induration, no fluctuance no signs or symptoms of overt skin infection.   ---Right ischium 1.3kfy3qgu5.3cm, 100% tan firmly adhered slough, no drainage, no odor. Periwound with reepithelialization. Periwound with no erythema, no increased warmth, no edema, no induration, no fluctuance no signs or symptoms of overt skin infection.   --- Goals of care: autolytic debridement, offload pressure, protect from friction and shear, monitor for tissue type changes.     Patient continues to be critically ill- at high risk for skin breakdown. On assessment patient on a low air loss surface, heel offloading boots in place, Z-flow positioning pillow utilized, moisture management in place with use of one breathable incontinence pad. Turned and positioned per protocol.  General: A&Ox1, bedbound, turned with 2 assist, incontinent of stool and urine, condom catheter in place. Skin warm, dry with increased moisture in intertriginous folds, adequate skin turgor, scattered areas of hyperpigmentation and hypopigmentation, scattered areas of ecchymosis (without hematoma) on bilateral upper extremities. Blanchable erythema on bilateral heels. +tracheostomy, peristomal skin intact, with increased moisture. PEG with peritubular skin intact. Feedings paused for assessment. Right hip with hyperpigmentation.    Sacrum: stage 2 pressure injury 2.3uja1kdy2.2cm, 100% pink moist dermis, small serosanguinous drainage, no odor. Periwound with no erythema, no increased warmth, no edema, no induration, no fluctuance no signs or symptoms of overt skin infection. Goals of care: offload pressure, protect from friction and shear, monitor for tissue type changes.     Bilateral ischium with ulcers, initially presented as stage 2 pressure injuries, now complicated by non pressure skin failure r/t critical illness:  ---Left ischium 3cmx2.5cmx0.2cm, 70% pink moist dermis, 30% tan firmly adhered slough, scant serosanguinous drainage, no odor. Periwound with no erythema, no increased warmth, no edema, no induration, no fluctuance no signs or symptoms of overt skin infection.   ---Right ischium 1.8axo0ngv8.3cm, 100% tan firmly adhered slough, no drainage, no odor. Periwound with reepithelialization. Periwound with no erythema, no increased warmth, no edema, no induration, no fluctuance no signs or symptoms of overt skin infection.   --- Goals of care: autolytic debridement, offload pressure, protect from friction and shear, monitor for tissue type changes.     Patient continues to be critically ill- at high risk for skin breakdown. On assessment patient on a low air loss surface, heel offloading boots in place, Z-flow positioning pillow utilized, moisture management in place with use of one breathable incontinence pad. Turned and positioned per protocol.  General: A&Ox1, bedbound, turned with 2 assist, incontinent of stool and urine, condom catheter in place. Skin warm, dry with increased moisture in intertriginous folds, adequate skin turgor, scattered areas of hyperpigmentation and hypopigmentation, scattered areas of ecchymosis (without hematoma) on bilateral upper extremities. Blanchable erythema on bilateral heels. +tracheostomy, peristomal skin intact, with increased moisture. PEG with peritubular skin intact. Feedings paused for assessment. Right hip with hyperpigmentation.    Sacrum: stage 2 pressure injury 2.5cdd7apv3.2cm, 100% pink moist dermis, small serosanguinous drainage, no odor. Periwound with no erythema, no increased warmth, no edema, no induration, no fluctuance no signs or symptoms of overt skin infection. Goals of care: offload pressure, protect from friction and shear, monitor for tissue type changes.     Bilateral ischium with ulcers, initially presented as stage 2 pressure injuries, now deteriorated 2/2 non pressure skin failure r/t critical illness:  ---Left ischium, presents  as unstageable pressure injury, 3cmx2.5cmx0.2cm, 70% pink moist dermis, 30% tan firmly adhered slough, scant serosanguinous drainage, no odor. Periwound with no erythema, no increased warmth, no edema, no induration, no fluctuance no signs or symptoms of overt skin infection.   ---Right ischium, presents  as unstageable pressure injury, 1.0dgl8knn7.3cm, 100% tan firmly adhered slough, no drainage, no odor. Periwound with reepithelialization. Periwound with no erythema, no increased warmth, no edema, no induration, no fluctuance no signs or symptoms of overt skin infection.   --- Goals of care: autolytic debridement, offload pressure, protect from friction and shear, monitor for tissue type changes.     Patient continues to be critically ill- at high risk for skin breakdown. On assessment patient on a low air loss surface, heel offloading boots in place, Z-flow positioning pillow utilized, moisture management in place with use of one breathable incontinence pad. Turned and positioned per protocol.  General: A&Ox1, bedbound, turned with 2 assist, incontinent of stool and urine, condom catheter in place. Skin warm, dry with increased moisture in intertriginous folds, adequate skin turgor, scattered areas of hyperpigmentation and hypopigmentation, scattered areas of ecchymosis (without hematoma) on bilateral upper extremities. Blanchable erythema on bilateral heels. +tracheostomy, peristomal skin intact, with increased moisture. PEG with peritubular skin intact. Feedings paused for assessment. Right hip with hyperpigmentation.    Sacrum: stage 2 pressure injury 2.4gxz9nrd1.2cm, 100% pink moist dermis, small serosanguinous drainage, no odor. Periwound with no erythema, no increased warmth, no edema, no induration, no fluctuance no signs or symptoms of overt skin infection. Goals of care: offload pressure, protect from friction and shear, monitor for tissue type changes.     Bilateral ischium with ulcers, initially presented as stage 2 pressure injuries, now deteriorated 2/2 critical illness (skin failure markers; hypoperfusion, mechanical ventilation >72 hours, severe protein calorie malnutrition):  ---Left ischium, presents  as unstageable pressure injury, 3cmx2.5cmx0.2cm, 70% pink moist dermis, 30% tan firmly adhered slough, scant serosanguinous drainage, no odor. Periwound with no erythema, no increased warmth, no edema, no induration, no fluctuance no signs or symptoms of overt skin infection.   ---Right ischium, presents  as unstageable pressure injury, 1.0jgx9oap9.3cm, 100% tan firmly adhered slough, no drainage, no odor. Periwound with reepithelialization. Periwound with no erythema, no increased warmth, no edema, no induration, no fluctuance no signs or symptoms of overt skin infection.   --- Goals of care: autolytic debridement, offload pressure, protect from friction and shear, monitor for tissue type changes.     Patient continues to be critically ill- at high risk for skin breakdown. On assessment patient on a low air loss surface, heel offloading boots in place, Z-flow positioning pillow utilized, moisture management in place with use of one breathable incontinence pad. Turned and positioned per protocol.  Statement Selected

## 2025-02-27 NOTE — PROGRESS NOTE ADULT - ASSESSMENT
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 Patent recently admitted in 6/2024 for left pontine and cerebellar CVA requiring tracheostomy. While at [facility] patient decannulated and passed SS with advanced diet to easy to chew with thin liquids, but complicated COVID requiring transfer to [facility] in 7/2024 and then ultimately discharged home. Per wife patient was doing well at home until 10/2024 when he was found with RLL with concern for aspiration requiring intubation, then tracheostomy, and ultimately discharged to NH with vent dependence in 11/2024. Patient now presented for RUE fistulogram and angioplasty with vascular, however course complicated by leukocytosis with concern for recurrent trachitis vs PNA and UTI, AOCD and hyperglycemia. Admitted to RCU for further management. Found with  PSA trachitis/ PNA and enterococcus faecium UTI. Course complicated by RUE brachial DVT and hypoxia with HD with concern for volume down vs PE?      NEUROLOGY  # Poor mentation second to metabolic encephalopathy vs psychosomatic/ depression   - Hx of L cerebellar and pontene embolic CVA x 2 with residual R hemiplegia   - AOx0, bedbound, and nonverbal at baseline per report, however per exam patient able to open eyes, able to follow commands on left (LUE>LLE) with SEVERE weakness, however noted with R hemiplegia per baseline  - Nods yes and no occasionally however keeps eyes closed likely psychosomatic vs metabolic encephalopathy with infections?   - Last CTH in 10/2024 with no acute findings   - Hold [email or web address] CT for now   - Monitor mentation     CARDIOVASCULAR  # Hx of HTN and HLD  - Continue on hydral 25 and norvasc 10   - Monitor BP     # Incidental Pericardial effusion   - Incidental small pericardial effusion seen adjacent to right ventricle, however IVC appears flat and LE without edema with low concern for RV failure.   - Prior TTE reviewed from 4/2024 with normal RVLVSF with no pericardial effusion noted at the time.   - TTE 2/23 with EF 65, normal LVRVSF, mild LAD, normal RA, mild pulmonary hypertension, and small pericardial effusion noted adjacent to the anterior right ventricle with no echocardiographic evidence of tamponade  - Monitor hemodynamics     RESPIRATORY  # Respiratory failure second to PNA vs volume down vs PE?   - Hx of COPD with chronic respiratory failure with tracheostomy and vent dependence  - Admitted for angioplasty of RUE AVF, however course complicated by leukocytosis with concern for recurrent trachitis/ PNA.   - Course complicated by hypoxia during HD likely volume down vs migration of RUE brachial DVT with PE?   - Held volume removal, bolus given, and hypoxia improved.   - Continue on heparin GTT for now  - CTA CHEST without PE  - Continue on vent 15/400/50  - Hold PS for now  - Continue on nebs       HEENT   # Hemoptysis   - Wife reported hemoptysis while at nursing home 2 weeks prior to admission   - No hemoptysis noted on admission   - CTA CHEST without PE  - Monitor for now  - 2/26 Bronch done at bedside - no evidence of bleeding     GI  # Dysphagia with PEG   - Passed SS with advanced diet to easy to chew with thin liquids in 7/2024, however since recurrent aspiration in 10/2024 now with PEG/ vent dependence   - Continue on PEG TF   - Monitor BMs    RENAL  # ESRD on HD MTTHF   # AVF trouble  # Dehydration   - Presented for RUE fistulogram and angioplasty with vascular on 2/18   - Resumed on HD with no flow issue from AVF   - Course complicated by hypoxia with concern for volume down given small IVC and elevated lactate on 2/21  - Volume removal held, lactate improved, and hypoxia improved.   - Monitor volume status and continue on HD MTTF per renal   - Monitor renal function, electrolytes and UOP     # Hyponatremia likely volume down   - Concern for volume issues, however overall appears volume down with serum osmo 307  - Sodium 128 and improved with HD/ bolus during HD.   - Trend sodium with HD for now     # Elevated lactate   - Lactate elevated likely second to volume down?   - Post bolus lactate trending down from 3.3 to 2.7 to 1.3    INFECTIOUS DISEASE  # Trachitis vs PNA/ UTI   - Hx of steno and PSA in sputum   - Flu/ COVID negative   - MRSA negative   - UCx with enterococcus   - SCx with  PSA   - Found with leukocytosis and started on cefepime and christiano (2/19) and vanco on HD days (2/21).   - No further steno seen in sputum and continue on cefepime (2/19 - 2/25) and vanco on HD days (2/21, 2/22 - 2/25)   - WBC increased likely reactive to hypoxic event and now improving  - ID following     # PPX   - MRSA PCR negative  - Candida auris PCR POSITIVE  - Continue on isolation precautions per IC    HEME  # AOCD   - Presented for angioplasty and found with anemia to 6.7 s/p 1U PRBC 2/19   - Anemia panel with concern for AOCD   - Recurrent anemia while on heparin GTT, however no overt signs of bleeding (gastric lavage clear and stool brown) and PTT never therapeutic.   - s/p 1/2U PRBC on 2/23 and monitor HH   - Continue on EPO QIW and Fe     # Heparin resistance?   - PTT persistently low despite increasing heparin GTT   - Resume on heparin GTT and check anti-Xa levels with PTT   - Anti Xa level supra therapeutic on 2/24, heparin GTT held and dose reduced, no active signs of bleeding or distress  - Continue to monitor Anti Xa level Q6 hours until therapeutic x 2 and then monitor Q24 hours  - Heparin held 2/2 hemoptysis     VASCULAR  # RUE DVT   - RUE edema noted with age-indeterminate, non-occlusive deep vein thrombosis noted in the right brachial vein.   - Case discussed with vascular who recommends full AC   - CTH from prior with encephalomalcia, however no hx of intracranial bleed   - Prior UGIB while on AC, however discharged on eliquis in 7/2024 and completed 6 month course for RLE DVT   - Continue on heparin GTT for now   - Case discussed with Wife who is apprehensive to full AC given bleeding hx, however she wishes for further investigation of pulmonary embolism and if positive she is amendable to risk of full anticoagulation. IF no PE found wife would like discuss further and possibly monitor off of anticoagulation.    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 Patent recently admitted in 6/2024 for left pontine and cerebellar CVA requiring tracheostomy. While at [facility] patient decannulated and passed SS with advanced diet to easy to chew with thin liquids, but complicated COVID requiring transfer to [facility] in 7/2024 and then ultimately discharged home. Per wife patient was doing well at home until 10/2024 when he was found with RLL with concern for aspiration requiring intubation, then tracheostomy, and ultimately discharged to NH with vent dependence in 11/2024. Patient now presented for RUE fistulogram and angioplasty with vascular, however course complicated by leukocytosis with concern for recurrent trachitis vs PNA and UTI, AOCD and hyperglycemia. Admitted to RCU for further management. Found with  PSA trachitis/ PNA and enterococcus faecium UTI. Course complicated by RUE brachial DVT and hypoxia with HD with concern for volume down vs PE?      NEUROLOGY  # Poor mentation second to metabolic encephalopathy vs psychosomatic/ depression   - Hx of L cerebellar and pontene embolic CVA x 2 with residual R hemiplegia   - AOx0, bedbound, and nonverbal at baseline per report, however per exam patient able to open eyes, able to follow commands on left (LUE>LLE) with SEVERE weakness, however noted with R hemiplegia per baseline  - Nods yes and no occasionally however keeps eyes closed likely psychosomatic vs metabolic encephalopathy with infections?   - Last CTH in 10/2024 with no acute findings   - Hold [facility] CT for now   - Monitor mentation     CARDIOVASCULAR  # Hx of HTN and HLD  - Continue on hydral 25 and norvasc 10   - Monitor BP     # Incidental Pericardial effusion   - Incidental small pericardial effusion seen adjacent to right ventricle, however IVC appears flat and LE without edema with low concern for RV failure.   - Prior TTE reviewed from 4/2024 with normal RVLVSF with no pericardial effusion noted at the time.   - TTE 2/23 with EF 65, normal LVRVSF, mild LAD, normal RA, mild pulmonary hypertension, and small pericardial effusion noted adjacent to the anterior right ventricle with no echocardiographic evidence of tamponade  - Monitor hemodynamics     RESPIRATORY  # Respiratory failure second to PNA vs volume down vs PE?   - Hx of COPD with chronic respiratory failure with tracheostomy and vent dependence  - Admitted for angioplasty of RUE AVF, however course complicated by leukocytosis with concern for recurrent trachitis/ PNA.   - Course complicated by hypoxia during HD likely volume down vs migration of RUE brachial DVT with PE?   - Held volume removal, bolus given, and hypoxia improved.   - Continue on heparin GTT for now  - CTA CHEST without PE  - Continue on vent 15/400/50  - Hold PS for now  - Continue on nebs       HEENT   # Hemoptysis   - Wife reported hemoptysis while at nursing home 2 weeks prior to admission   - No hemoptysis noted on admission   - CTA CHEST without PE  - Monitor for now  - 2/26 Bronch done at bedside - no evidence of bleeding   - 2/27 no hemoptysis will start heparin sq and eval     GI  # Dysphagia with PEG   - Passed SS with advanced diet to easy to chew with thin liquids in 7/2024, however since recurrent aspiration in 10/2024 now with PEG/ vent dependence   - Continue on PEG TF   - Monitor BMs    RENAL  # ESRD on HD MTTHF   # AVF trouble  # Dehydration   - Presented for RUE fistulogram and angioplasty with vascular on 2/18   - Resumed on HD with no flow issue from AVF   - Course complicated by hypoxia with concern for volume down given small IVC and elevated lactate on 2/21  - Volume removal held, lactate improved, and hypoxia improved.   - Monitor volume status and continue on HD MTTF per renal   - Monitor renal function, electrolytes and UOP     # Hyponatremia likely volume down   - Concern for volume issues, however overall appears volume down with serum osmo 307  - Sodium 128 and improved with HD/ bolus during HD.   - Trend sodium with HD for now     # Elevated lactate   - Lactate elevated likely second to volume down?   - Post bolus lactate trending down from 3.3 to 2.7 to 1.3    INFECTIOUS DISEASE  # Trachitis vs PNA/ UTI   - Hx of steno and PSA in sputum   - Flu/ COVID negative   - MRSA negative   - UCx with enterococcus   - SCx with  PSA   - Found with leukocytosis and started on cefepime and christiano (2/19) and vanco on HD days (2/21).   - No further steno seen in sputum and continue on cefepime (2/19 - 2/25) and vanco on HD days (2/21, 2/22 - 2/25)   - WBC increased likely reactive to hypoxic event and now improving  - ID following observe off abx     # PPX   - MRSA PCR negative  - Candida auris PCR POSITIVE  - Continue on isolation precautions per IC    HEME  # AOCD   - Presented for angioplasty and found with anemia to 6.7 s/p 1U PRBC 2/19   - Anemia panel with concern for AOCD   - Recurrent anemia while on heparin GTT, however no overt signs of bleeding (gastric lavage clear and stool brown) and PTT never therapeutic.   - s/p 1/2U PRBC on 2/23 and monitor HH   - Continue on EPO QIW and Fe     # Heparin resistance?   - PTT persistently low despite increasing heparin GTT   - Resume on heparin GTT and check anti-Xa levels with PTT   - Anti Xa level supra therapeutic on 2/24, heparin GTT held and dose reduced, no active signs of bleeding or distress  - Continue to monitor Anti Xa level Q6 hours until therapeutic x 2 and then monitor Q24 hours  - Heparin held 2/2 hemoptysis     VASCULAR  # RUE DVT   - RUE edema noted with age-indeterminate, non-occlusive deep vein thrombosis noted in the right brachial vein.   - Case discussed with vascular who recommends full AC   - CTH from prior with encephalomalcia, however no hx of intracranial bleed   - Prior UGIB while on AC, however discharged on eliquis in 7/2024 and completed 6 month course for RLE DVT   - Continue on heparin GTT for now   - Case discussed with Wife who is apprehensive to full AC given bleeding hx, however she wishes for further investigation of pulmonary embolism and if positive she is amendable to risk of full anticoagulation. IF no PE found wife would like discuss further and possibly monitor off of anticoagulation.    ENDOCRINE  # DM2 A1C 14.0 (1/2024) > 6.4 (2/2025)  - Presented with hyperglycemia and continued on lantus and ISS   - Increase lantus to 28U QHS and ISS, however FS continues to trend in 200s   - Adjust insulin to NPH 16U Q6H (1200, 1800 and 0000) with tube feeds ([id number])    - Monitor FS    ETHICS/ GOC    - FULL CODE     DISPO - Back to NH when able

## 2025-02-27 NOTE — PROGRESS NOTE ADULT - ASSESSMENT
74-year-old male hx trach/vent - , CVA, COPD, stage renal disease on dialysis 4 times a week ([facility]TR[facility]) history of pressure ulcer.  Patient presents the ED today for elevated white count. nephrology consulted for dialysis needs    ESRD:  [facility]  On HD MTTsF via an A-V fistula. s/p fistulogram by vascular 2/18  Continue MWF in hospital  consent from wife in dialysis unit  s/p hd 2/26 uf 2.5L tolerated well. hd tmr  monitor bmp    htn  better  continue norvasc 10 daily and hydralazine 25 tid  max uf as tolerated  monitor    Anemia:  Transfuse for Hg < 7.  s/p 1 unit PRBC 2/23  epo with hd  iron sat >20  monitor    leukocytosis  sepsis work up per team    hyponatremia  in setting of esrd and hyperglycemia  optimize dm control  hd per schedule with uF  monitor

## 2025-02-27 NOTE — PROVIDER CONTACT NOTE (HYPOGLYCEMIA EVENT) - NS PROVIDER CONTACT ASSESS-HYPO
Pt unable to provide subjective symptoms. Blood glucose at 2315 resulted in 39, repeat at 2318 was 40.

## 2025-02-27 NOTE — ADVANCED PRACTICE NURSE CONSULT - REASON FOR CONSULT
Patient seen on skin care rounds after wound care referral received for assessment of skin impairment and recommendations of topical management of sacral and bilateral ischium wounds. 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 Patent recently admitted in 6/2024 for left pontine and cerebellar CVA requiring tracheostomy. While at [facility] patient decannulated and passed SS with advanced diet to easy to chew with thin liquids, but complicated COVID requiring transfer to [facility] in 7/2024 and then ultimately discharged home. Per wife patient was doing well at home until 10/2024 when he was found with RLL with concern for aspiration requiring intubation, then tracheostomy, and ultimayely discharged to NH with vent dependence in 11/2024. Patient now presented for RUE fistulogram and angioplasty with vascular, however course complicated by leukocytosis with concern for recurrent trachitis vs PNA and UTI, AOCD and hyperglycemia. Admitted to RCU for further management. Found with  PSA trachitis/ PNA and enterococcus faecium UTI. Course complicated by RUE brachial DVT and hypoxia with HD with concern for volume down vs PE. 2/26/25 Bronchoscopy.     Acute Skin Failure Markers: ESRD on hemodialysis, mechanical ventilation >72 hours, severe protein calorie malnutrition, hypoglycemia (FS 39 2/26/25), hypoperfusion r/t: anemia Hgb 6.5 requiring 1/2 PRBC transfusion (2/23),  desaturation/hypoxia on 2/21 ; RR 30s, tachycardic to 110, and SPO2 79%.      Chart reviewed: WBC 17.98, H/H 9.8/32.1, INR 1.04, Platelets 196, hA1c 6.4, BMI 20.4, gertrude 12.  Patient seen on skin care rounds after wound care referral received for assessment of skin impairment and recommendations of topical management of sacral and bilateral ischium wounds. 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 Patent recently admitted in 6/2024 for left pontine and cerebellar CVA requiring tracheostomy. While at [facility] patient decannulated and passed SS with advanced diet to easy to chew with thin liquids, but complicated COVID requiring transfer to [facility] in 7/2024 and then ultimately discharged home. Per wife patient was doing well at home until 10/2024 when he was found with RLL with concern for aspiration requiring intubation, then tracheostomy, and ultimayely discharged to NH with vent dependence in 11/2024. Patient now presented for RUE fistulogram and angioplasty with vascular, however course complicated by leukocytosis with concern for recurrent trachitis vs PNA and UTI, AOCD and hyperglycemia. Admitted to RCU for further management. Found with  PSA trachitis/ PNA and enterococcus faecium UTI. Course complicated by RUE brachial DVT and hypoxia with HD with concern for volume down vs PE. 2/26/25 Bronchoscopy.     Acute Skin Failure Markers: ESRD on hemodialysis, mechanical ventilation >72 hours, severe protein calorie malnutrition, hypoglycemia (FS 39 2/26/25), hypoperfusion r/t: anemia Hgb 6.5 requiring 1/2 PRBC transfusion (2/23),  desaturation/hypoxia on 2/21 ; RR 30s, tachycardic to 110, and SPO2 79%.      Chart reviewed: WBC 17.98, H/H 9.8/32.1, INR 1.04, Platelets 196, hA1c 6.4, BMI 20.4, gertrude 12.

## 2025-02-27 NOTE — PROGRESS NOTE ADULT - SUBJECTIVE AND OBJECTIVE BOX
[facility] [place] [facility]   MD ELIANE BHAGAT MD ANGELA WONG, PA    TEL:  OFFICE: [phone]  From 5pm-7am Answering Service [phone]    -- RENAL FOLLOW UP NOTE ---Date of Service 02-27-25 @ 16:03    Patient is a 74y old  Male who presents with a chief complaint of leukocytosis (20 Feb 2025 13:41)      Patient seen and examined at bedside.     VITALS:  T(F): 97.9 (02-27-25 @ 12:00), Max: 98.2 (02-27-25 @ 00:00)  HR: 90 (02-27-25 @ 12:00)  BP: 146/57 (02-27-25 @ 12:00)  RR: 16 (02-27-25 @ 12:00)  SpO2: 100% (02-27-25 @ 12:00)  Wt(kg): --    02-26 @ 07:01  -  02-27 @ 07:00  --------------------------------------------------------  IN: 1660 mL / OUT: 2900 mL / NET: -1240 mL          PHYSICAL EXAM:  General: NAD  Neck: +trach  Respiratory: + rhonchi  Cardiovascular: S1, S2, RRR  Gastrointestinal: +peg  Extremities: No peripheral edema    Hospital Medications:   MEDICATIONS  (STANDING):  albuterol/ipratropium for Nebulization 3 milliLiter(s) Nebulizer every 6 hours  amLODIPine   Tablet 10 milliGRAM(s) Oral daily  AQUAPHOR (petrolatum Ointment) 1 Application(s) Topical two times a day  atorvastatin 20 milliGRAM(s) Oral at bedtime  chlorhexidine 0.12% Liquid 15 milliLiter(s) Oral Mucosa every 12 hours  chlorhexidine 2% Cloths 1 Application(s) Topical daily  dextrose 5%. 1000 milliLiter(s) (100 mL/Hr) IV Continuous <Continuous>  dextrose 5%. 1000 milliLiter(s) (50 mL/Hr) IV Continuous <Continuous>  dextrose 50% Injectable 25 Gram(s) IV Push once  dextrose 50% Injectable 12.5 Gram(s) IV Push once  dextrose 50% Injectable 25 Gram(s) IV Push once  epoetin reilly-epbx (RETACRIT) Injectable 78052 Unit(s) IV Push <User Schedule>  ferrous    sulfate Liquid 300 milliGRAM(s) Enteral Tube daily  glucagon  Injectable 1 milliGRAM(s) IntraMuscular once  heparin   Injectable 5000 Unit(s) SubCutaneous every 8 hours  hydrALAZINE 25 milliGRAM(s) Oral three times a day  insulin lispro (ADMELOG) corrective regimen sliding scale   SubCutaneous every 6 hours  insulin NPH human recombinant 16 Unit(s) SubCutaneous <User Schedule>  Nephro-noam 1 Tablet(s) Oral daily  pantoprazole   Suspension 40 milliGRAM(s) Oral before breakfast      LABS:  02-27    129[L]  |  87[L]  |  37[H]  ----------------------------<  141[H]  3.7   |  28  |  2.03[H]    Ca    9.0      27 Feb 2025 05:00  Phos  2.1     02-27  Mg     2.80     02-27      Creatinine Trend: 2.03 <--, 2.59 <--, 1.78 <--, 3.10 <--, 2.49 <--, 1.65 <--, 1.39 <--, 2.88 <--    Phosphorus: 2.1 mg/dL (02-27 @ 05:00)                              9.8    17.98 )-----------( 196      ( 27 Feb 2025 05:00 )             32.1     Urine Studies:  Urinalysis - [02-27-25 @ 05:00]      Color  / Appearance  / SG  / pH       Gluc 141 / Ketone   / Bili  / Urobili        Blood  / Protein  / Leuk Est  / Nitrite       RBC  / WBC  / Hyaline  / Gran  / Sq Epi  / Non Sq Epi  / Bacteria       Iron 46, TIBC 142, %sat 32      [02-19-25 @ 11:39]  Ferritin 3601      [02-19-25 @ 11:39]  PTH -- (Ca --)      [05-26-24 @ 01:20]   122  TSH 1.660      [10-05-24 @ 08:37]  Lipid: chol --, , HDL --, LDL --      [10-18-24 @ 06:00]    HBsAb 654.8      [02-19-25 @ 19:05]  HBsAg Nonreact      [02-19-25 @ 19:05]  HBcAb Nonreact      [02-19-25 @ 19:05]  HCV 0.19, Nonreact      [02-19-25 @ 19:05]      RADIOLOGY & ADDITIONAL STUDIES:

## 2025-02-27 NOTE — ADVANCED PRACTICE NURSE CONSULT - RECOMMEDATIONS
Topical recommendations:   ---Sacrum: Cleanse with skin cleanser, pat dry. Apply TRIAD paste twice a day and PRN with incontinent episodes. With episodes of incontinence only remove soiled layer of Triad, then reinforce with thin layer.   ---Bilateral ischium: Cleanse with NS, pat dry. Apply Liquid barrier film to periwound skin (allow to dry). Apply medihoney gel to wound bed, cover with silicone foam with border. Change daily and PRN if soiled.   --- Tracheostomy: Cleanse around trach site with NS. Pat dry. Apply Silicone foam dressing without border beneath trach collar, cut mid dressing to "Y" shape. Change foam dressing every shift and prn. Change trach ties every 3 days.   --- PEG: Cleanse q shift with NS, apply liquid barrier film beneath jalen disc.  If redness noted under jalen disc bumper apply thin foam  dressing without border (Mepilex Lite)- cut to mid dressing with a 'Y' shape.   Secondary securement to abdomen taping in 'H' fashion with Steri-strips.   --- While inpatient, continue low air loss bed therapy, continue heel elevation, continue to turn & reposition per protocol, soft pillow between bony prominences, continue moisture management with barrier creams & single breathable pad, continue measures to decrease friction/shear/pressure. Continue with nutritional support as per dietary/orders.     Plan discussed with primary RN Dionna Faustin.  Please contact Wound/Ostomy Care Service Line if we can be of further assistance (ext [phone]). Please reconsult if changes to tissue type noted.  Topical recommendations:   ---Sacrum: Cleanse with skin cleanser, pat dry. Apply TRIAD paste twice a day and PRN with incontinent episodes. With episodes of incontinence only remove soiled layer of Triad, then reinforce with thin layer.   ---Bilateral ischium: Cleanse with NS, pat dry. Apply Liquid barrier film to periwound skin (allow to dry). Apply medihoney gel to wound bed, cover with silicone foam with border. Change daily and PRN if soiled.   ---Tracheostomy: Cleanse around trach site with NS. Pat dry. Apply Silicone foam dressing without border beneath trach collar, cut mid dressing to "Y" shape. Change foam dressing every shift and prn. Change trach ties every 3 days.   --- PEG: Cleanse q shift with NS, apply liquid barrier film beneath jalen disc.  If redness noted under jalen disc bumper apply thin foam  dressing without border (Mepilex Lite)- cut to mid dressing with a 'Y' shape.   Secondary securement to abdomen taping in 'H' fashion with Steri-strips.   --- While inpatient, continue low air loss bed therapy, continue heel elevation, continue to turn & reposition per protocol, soft pillow between bony prominences, continue moisture management with barrier creams & single breathable pad, continue measures to decrease friction/shear/pressure. Continue with nutritional support as per dietary/orders.     Plan discussed with primary RN Dionna Faustin.  Please contact Wound/Ostomy Care Service Line if we can be of further assistance (ext [phone]). Please reconsult if changes to tissue type noted.  Topical recommendations:   ---Sacrum: Cleanse with skin cleanser, pat dry. Apply TRIAD paste twice a day and PRN with incontinent episodes. With episodes of incontinence only remove soiled layer of Triad, then reinforce with thin layer.   ---Bilateral ischium: Cleanse with NS, pat dry. Apply Liquid barrier film to periwound skin (allow to dry). Apply medihoney gel to wound bed, cover with silicone foam with border. Change daily and PRN if soiled.   ---Tracheostomy: Cleanse around trach site with NS. Pat dry. Apply Silicone foam dressing without border beneath trach collar, cut mid dressing to "Y" shape. Change foam dressing every shift and prn. Change trach ties every 3 days.   --- PEG: Cleanse q shift with NS, apply liquid barrier film beneath jalen disc.  If redness noted under jalen disc bumper apply thin foam  dressing without border (Mepilex Lite)- cut to mid dressing with a 'Y' shape.   Secondary securement to abdomen taping in 'H' fashion with Steri-strips.   --- While inpatient, continue low air loss bed therapy, continue heel elevation, continue to turn & reposition per protocol, soft pillow between bony prominences, continue moisture management with barrier creams & single breathable pad, continue measures to decrease friction/shear/pressure. Continue with nutritional support as per dietary/orders.     Plan discussed with primary RN Dionna Faustin.  Please contact Wound/Ostomy Care Service Line if we can be of further assistance (ext [phone]). Please reconsult if changes to tissue type noted.

## 2025-02-27 NOTE — CHART NOTE - NSCHARTNOTEFT_GEN_A_CORE
Notified by RN that patient is hypoglycemic to 39 and repeat was 40.  Recommended RN to initiate hypoglycemic protocol. Patient is on bolus feed 4 times a day. As per RN patient continues to receive bolus at the time of this hypoglycemic episode. Dextrose 50% 25 Grams IV Push x1 given. Recommended RN to hold 12 midnight dose of NPH insulin. Fingerstick improved to 130. Will continue to monitor.

## 2025-02-28 LAB
-  AZTREONAM: SIGNIFICANT CHANGE UP
-  CEFEPIME: SIGNIFICANT CHANGE UP
-  CEFTAZIDIME: SIGNIFICANT CHANGE UP
-  CIPROFLOXACIN: SIGNIFICANT CHANGE UP
-  IMIPENEM: SIGNIFICANT CHANGE UP
-  LEVOFLOXACIN: SIGNIFICANT CHANGE UP
-  MEROPENEM: SIGNIFICANT CHANGE UP
-  PIPERACILLIN/TAZOBACTAM: SIGNIFICANT CHANGE UP
ANION GAP SERPL CALC-SCNC: 15 MMOL/L — HIGH (ref 7–14)
APTT BLD: 29.4 SEC — SIGNIFICANT CHANGE UP (ref 24.5–35.6)
BASOPHILS # BLD AUTO: 0.03 K/UL — SIGNIFICANT CHANGE UP (ref 0–0.2)
BASOPHILS NFR BLD AUTO: 0.2 % — SIGNIFICANT CHANGE UP (ref 0–2)
BUN SERPL-MCNC: 67 MG/DL — HIGH (ref 7–23)
CALCIUM SERPL-MCNC: 9.4 MG/DL — SIGNIFICANT CHANGE UP (ref 8.4–10.5)
CHLORIDE SERPL-SCNC: 88 MMOL/L — LOW (ref 98–107)
CO2 SERPL-SCNC: 26 MMOL/L — SIGNIFICANT CHANGE UP (ref 22–31)
CREAT SERPL-MCNC: 3.1 MG/DL — HIGH (ref 0.5–1.3)
CULTURE RESULTS: ABNORMAL
EGFR: 21 ML/MIN/1.73M2 — LOW
EGFR: 21 ML/MIN/1.73M2 — LOW
EOSINOPHIL # BLD AUTO: 0.05 K/UL — SIGNIFICANT CHANGE UP (ref 0–0.5)
EOSINOPHIL NFR BLD AUTO: 0.3 % — SIGNIFICANT CHANGE UP (ref 0–6)
GLUCOSE BLDC GLUCOMTR-MCNC: 155 MG/DL — HIGH (ref 70–99)
GLUCOSE BLDC GLUCOMTR-MCNC: 231 MG/DL — HIGH (ref 70–99)
GLUCOSE BLDC GLUCOMTR-MCNC: 246 MG/DL — HIGH (ref 70–99)
GLUCOSE BLDC GLUCOMTR-MCNC: 315 MG/DL — HIGH (ref 70–99)
GLUCOSE SERPL-MCNC: 225 MG/DL — HIGH (ref 70–99)
HCT VFR BLD CALC: 25.6 % — LOW (ref 39–50)
HGB BLD-MCNC: 8.1 G/DL — LOW (ref 13–17)
IANC: 15.99 K/UL — HIGH (ref 1.8–7.4)
IMM GRANULOCYTES NFR BLD AUTO: 0.6 % — SIGNIFICANT CHANGE UP (ref 0–0.9)
LYMPHOCYTES # BLD AUTO: 0.62 K/UL — LOW (ref 1–3.3)
LYMPHOCYTES # BLD AUTO: 3.5 % — LOW (ref 13–44)
MAGNESIUM SERPL-MCNC: 2.9 MG/DL — HIGH (ref 1.6–2.6)
MCHC RBC-ENTMCNC: 24.1 PG — LOW (ref 27–34)
MCHC RBC-ENTMCNC: 31.6 G/DL — LOW (ref 32–36)
MCV RBC AUTO: 76.2 FL — LOW (ref 80–100)
METHOD TYPE: SIGNIFICANT CHANGE UP
MONOCYTES # BLD AUTO: 0.98 K/UL — HIGH (ref 0–0.9)
MONOCYTES NFR BLD AUTO: 5.5 % — SIGNIFICANT CHANGE UP (ref 2–14)
NEUTROPHILS # BLD AUTO: 15.99 K/UL — HIGH (ref 1.8–7.4)
NEUTROPHILS NFR BLD AUTO: 89.9 % — HIGH (ref 43–77)
NRBC # BLD AUTO: 0.02 K/UL — HIGH (ref 0–0)
NRBC # FLD: 0.02 K/UL — HIGH (ref 0–0)
NRBC BLD AUTO-RTO: 0 /100 WBCS — SIGNIFICANT CHANGE UP (ref 0–0)
ORGANISM # SPEC MICROSCOPIC CNT: ABNORMAL
ORGANISM # SPEC MICROSCOPIC CNT: ABNORMAL
PHOSPHATE SERPL-MCNC: 2.5 MG/DL — SIGNIFICANT CHANGE UP (ref 2.5–4.5)
PLATELET # BLD AUTO: 242 K/UL — SIGNIFICANT CHANGE UP (ref 150–400)
POTASSIUM SERPL-MCNC: 3.9 MMOL/L — SIGNIFICANT CHANGE UP (ref 3.5–5.3)
POTASSIUM SERPL-SCNC: 3.9 MMOL/L — SIGNIFICANT CHANGE UP (ref 3.5–5.3)
RBC # BLD: 3.36 M/UL — LOW (ref 4.2–5.8)
RBC # FLD: 18.4 % — HIGH (ref 10.3–14.5)
SODIUM SERPL-SCNC: 129 MMOL/L — LOW (ref 135–145)
SPECIMEN SOURCE: SIGNIFICANT CHANGE UP
WBC # BLD: 17.77 K/UL — HIGH (ref 3.8–10.5)
WBC # FLD AUTO: 17.77 K/UL — HIGH (ref 3.8–10.5)

## 2025-02-28 PROCEDURE — 99233 SBSQ HOSP IP/OBS HIGH 50: CPT | Mod: FS

## 2025-02-28 RX ORDER — ARGATROBAN 100 MG/ML
1 INJECTION, SOLUTION INTRAVENOUS
Qty: 50 | Refills: 0 | Status: DISCONTINUED | OUTPATIENT
Start: 2025-02-28 | End: 2025-03-01

## 2025-02-28 RX ORDER — ARGATROBAN 100 MG/ML
1 INJECTION, SOLUTION INTRAVENOUS
Qty: 100 | Refills: 0 | Status: DISCONTINUED | OUTPATIENT
Start: 2025-02-28 | End: 2025-02-28

## 2025-02-28 RX ADMIN — EPOETIN ALFA 10000 UNIT(S): 10000 SOLUTION INTRAVENOUS; SUBCUTANEOUS at 16:03

## 2025-02-28 RX ADMIN — INSULIN LISPRO 2: 100 INJECTION, SOLUTION INTRAVENOUS; SUBCUTANEOUS at 11:30

## 2025-02-28 RX ADMIN — Medication 25 MILLIGRAM(S): at 18:01

## 2025-02-28 RX ADMIN — Medication 10 MILLIGRAM(S): at 15:45

## 2025-02-28 RX ADMIN — WHITE PETROLATUM 1 APPLICATION(S): 1 OINTMENT TOPICAL at 17:59

## 2025-02-28 RX ADMIN — INSULIN LISPRO 1: 100 INJECTION, SOLUTION INTRAVENOUS; SUBCUTANEOUS at 00:33

## 2025-02-28 RX ADMIN — Medication 40 MILLIGRAM(S): at 05:40

## 2025-02-28 RX ADMIN — ARGATROBAN 3.23 MICROGRAM(S)/KG/MIN: 100 INJECTION, SOLUTION INTRAVENOUS at 18:00

## 2025-02-28 RX ADMIN — IPRATROPIUM BROMIDE AND ALBUTEROL SULFATE 3 MILLILITER(S): .5; 2.5 SOLUTION RESPIRATORY (INHALATION) at 09:14

## 2025-02-28 RX ADMIN — ATORVASTATIN CALCIUM 20 MILLIGRAM(S): 80 TABLET, FILM COATED ORAL at 21:58

## 2025-02-28 RX ADMIN — INSULIN LISPRO 4: 100 INJECTION, SOLUTION INTRAVENOUS; SUBCUTANEOUS at 05:40

## 2025-02-28 RX ADMIN — Medication 4 UNIT(S): at 11:30

## 2025-02-28 RX ADMIN — Medication 10 MILLIGRAM(S): at 17:57

## 2025-02-28 RX ADMIN — HEPARIN SODIUM 5000 UNIT(S): 1000 INJECTION INTRAVENOUS; SUBCUTANEOUS at 05:40

## 2025-02-28 RX ADMIN — Medication 4 UNIT(S): at 23:41

## 2025-02-28 RX ADMIN — Medication 1 APPLICATION(S): at 11:28

## 2025-02-28 RX ADMIN — Medication 15 MILLILITER(S): at 05:32

## 2025-02-28 RX ADMIN — Medication 15 MILLILITER(S): at 18:00

## 2025-02-28 RX ADMIN — Medication 25 MILLIGRAM(S): at 21:58

## 2025-02-28 RX ADMIN — WHITE PETROLATUM 1 APPLICATION(S): 1 OINTMENT TOPICAL at 05:30

## 2025-02-28 RX ADMIN — INSULIN LISPRO 2: 100 INJECTION, SOLUTION INTRAVENOUS; SUBCUTANEOUS at 23:43

## 2025-02-28 RX ADMIN — IPRATROPIUM BROMIDE AND ALBUTEROL SULFATE 3 MILLILITER(S): .5; 2.5 SOLUTION RESPIRATORY (INHALATION) at 16:50

## 2025-02-28 RX ADMIN — IPRATROPIUM BROMIDE AND ALBUTEROL SULFATE 3 MILLILITER(S): .5; 2.5 SOLUTION RESPIRATORY (INHALATION) at 03:04

## 2025-02-28 RX ADMIN — IPRATROPIUM BROMIDE AND ALBUTEROL SULFATE 3 MILLILITER(S): .5; 2.5 SOLUTION RESPIRATORY (INHALATION) at 21:49

## 2025-02-28 RX ADMIN — INSULIN LISPRO 1: 100 INJECTION, SOLUTION INTRAVENOUS; SUBCUTANEOUS at 17:59

## 2025-02-28 RX ADMIN — Medication 1 TABLET(S): at 11:29

## 2025-02-28 RX ADMIN — Medication 300 MILLIGRAM(S): at 11:29

## 2025-02-28 RX ADMIN — Medication 4 UNIT(S): at 17:59

## 2025-02-28 NOTE — PROGRESS NOTE ADULT - SUBJECTIVE AND OBJECTIVE BOX
[facility]  AUGUST Carbajal Y. Patel, S. Shah, G. Casimir  [phone]  ([phone] - weekdays after 5pm and weekends)    Name: JIMMY BLAND  Age/Gender: 74y Male  MRN: [id number]    Interval History:  Notes reviewed.   No concerning overnight events.  Afebrile.   remains on the vent    Allergies: No Known Allergies      Objective:  Vitals:   T(F): 98.8 (02-28-25 @ 12:00), Max: 98.9 (02-28-25 @ 00:00)  HR: 86 (02-28-25 @ 12:00) (83 - 110)  BP: 160/65 (02-28-25 @ 12:00) (143/67 - 172/78)  RR: 13 (02-28-25 @ 12:00) (13 - 21)  SpO2: 100% (02-28-25 @ 12:00) (100% - 100%)  Physical Examination:  General: frail appearing  HEENT: anicteric, tracheostomy, on vent  Cardio: S1, S2, normal rate  Resp: clear to auscultation anteriorly   Abd: Soft. Nondistended. PEG  Ext: no LE edema  Skin: warm, dry    Laboratory Studies:  CBC:                       9.8    17.98 )-----------( 196      ( 27 Feb 2025 05:00 )             32.1     WBC Trend:  17.98 02-27-25 @ 05:00  13.63 02-26-25 @ 12:20  13.08 02-26-25 @ 05:43  12.02 02-25-25 @ 01:30  13.52 02-24-25 @ 06:20  14.46 02-23-25 @ 15:06  15.39 02-23-25 @ 06:30  14.31 02-23-25 @ 05:20  20.02 02-22-25 @ 10:07    CMP: 02-27    129[L]  |  87[L]  |  37[H]  ----------------------------<  141[H]  3.7   |  28  |  2.03[H]    Ca    9.0      27 Feb 2025 05:00  Phos  2.1     02-27  Mg     2.80     02-27          Vancomycin Level, Random: 14.3 ug/mL (02-24-25 @ 06:20)    Urinalysis Basic - ( 27 Feb 2025 05:00 )    Color: x / Appearance: x / SG: x / pH: x  Gluc: 141 mg/dL / Ketone: x  / Bili: x / Urobili: x   Blood: x / Protein: x / Nitrite: x   Leuk Esterase: x / RBC: x / WBC x   Sq Epi: x / Non Sq Epi: x / Bacteria: x      Microbiology: reviewed     Culture - Fungal, Bronchial (collected 02-26-25 @ 16:43)  Source: Bronchial Bronchial Lavage  Preliminary Report (02-28-25 @ 08:36):    No growth    Culture - Bronchial (collected 02-26-25 @ 16:43)  Source: Bronchial Bronchial Lavage  Gram Stain (02-26-25 @ 23:04):    Few polymorphonuclear leukocytes per low power field    No squamous epithelial cells    Few Gram Negative Rods per oil power field  Preliminary Report (02-27-25 @ 21:48):    Few Pseudomonas aeruginosa    Commensal dominick consistent with body site    Culture - Urine (collected 02-20-25 @ 18:00)  Source: Clean Catch Clean Catch (Midstream)  Final Report (02-23-25 @ 08:45):    >100,000 CFU/ml Enterococcus faecium  Organism: Enterococcus faecium (02-23-25 @ 08:45)  Organism: Enterococcus faecium (02-23-25 @ 08:45)      Method Type: VIDA      -  Ampicillin: R >8 Predicts results to ampicillin/sulbactam, amoxacillin-clavulanate and  piperacillin-tazobactam.      -  Ciprofloxacin: R >2      -  Levofloxacin: R >4      -  Nitrofurantoin: S <=32 Should not be used to treat pyelonephritis.      -  Tetracycline: R >8      -  Vancomycin: S 0.5    Culture - INF Candida auris (collected 02-19-25 @ 18:46)  Source: Nares/Axilla/Groin Nares/Axilla/Groin  Final Report (02-22-25 @ 21:59):    Culture POSITIVE for Candida auris, susceptibilities not performed for    this specimen type.    This surveillance culture is intended for Infection Control purposes only.    Culture - Sputum (collected 02-19-25 @ 15:54)  Source: Trach Asp Tracheal Aspirate  Gram Stain (02-19-25 @ 22:37):    Numerous polymorphonuclear leukocytes per low power field    Rare Squamous epithelial cells per low power field    Numerous Gram Negative Rods seen per oil power field    Few Gram Positive Rods seen per oil power field  Final Report (02-21-25 @ 21:20):    Few Pseudomonas aeruginosa (Carbapenem Resistant)    Commensal dominick consistent with body site  Organism: Pseudomonas aeruginosa (Carbapenem Resistant) (02-21-25 @ 21:20)  Organism: Pseudomonas aeruginosa (Carbapenem Resistant) (02-21-25 @ 21:20)      Method Type: CarbaR      -  Resistance Gene to Carbapenem: Nondet  Organism: Pseudomonas aeruginosa (Carbapenem Resistant) (02-21-25 @ 21:20)      Method Type: VIDA      -  Aztreonam: R >16      -  Cefepime: S 8      -  Ceftazidime: I 16      -  Ceftazidime/Avibactam: S <=4      -  Ceftolozane/tazobactam: S <=2      -  Ciprofloxacin: S <=0.25      -  Imipenem: R >8      -  Levofloxacin: S <=0.5      -  Meropenem: R >8      -  Piperacillin/Tazobactam: R 64    Culture - Urine (collected 02-18-25 @ 18:03)  Source: Clean Catch Clean Catch (Midstream)  Final Report (02-20-25 @ 15:06):    >100,000 CFU/ml Enterococcus faecium  Organism: Enterococcus faecium (02-20-25 @ 15:06)  Organism: Enterococcus faecium (02-20-25 @ 15:06)      Method Type: VIDA      -  Ampicillin: R >8 Predicts results to ampicillin/sulbactam, amoxacillin-clavulanate and  piperacillin-tazobactam.      -  Ciprofloxacin: R >2      -  Levofloxacin: R >4      -  Nitrofurantoin: S <=32 Should not be used to treat pyelonephritis.      -  Tetracycline: R >8      -  Vancomycin: S 0.5    Culture - Blood (collected 02-18-25 @ 16:05)  Source: .Blood Blood-Venous  Final Report (02-23-25 @ 22:01):    No growth at 5 days    Culture - Blood (collected 02-18-25 @ 16:00)  Source: .Blood Blood-Peripheral  Final Report (02-23-25 @ 22:01):    No growth at 5 days        Radiology: reviewed     Medications:  albuterol/ipratropium for Nebulization 3 milliLiter(s) Nebulizer every 6 hours  amLODIPine   Tablet 10 milliGRAM(s) Oral daily  AQUAPHOR (petrolatum Ointment) 1 Application(s) Topical two times a day  atorvastatin 20 milliGRAM(s) Oral at bedtime  chlorhexidine 0.12% Liquid 15 milliLiter(s) Oral Mucosa every 12 hours  chlorhexidine 2% Cloths 1 Application(s) Topical daily  dextrose 5%. 1000 milliLiter(s) IV Continuous <Continuous>  dextrose 5%. 1000 milliLiter(s) IV Continuous <Continuous>  dextrose 50% Injectable 12.5 Gram(s) IV Push once  dextrose 50% Injectable 25 Gram(s) IV Push once  dextrose 50% Injectable 25 Gram(s) IV Push once  dextrose Oral Gel 15 Gram(s) Oral once PRN  epoetin reilly-epbx (RETACRIT) Injectable 46108 Unit(s) IV Push <User Schedule>  ferrous    sulfate Liquid 300 milliGRAM(s) Enteral Tube daily  glucagon  Injectable 1 milliGRAM(s) IntraMuscular once  hydrALAZINE 25 milliGRAM(s) Oral three times a day  insulin lispro (ADMELOG) corrective regimen sliding scale   SubCutaneous every 6 hours  insulin NPH human recombinant 4 Unit(s) SubCutaneous every 6 hours  Nephro-noam 1 Tablet(s) Oral daily  pantoprazole   Suspension 40 milliGRAM(s) Oral before breakfast  sodium chloride 0.9% Bolus. 100 milliLiter(s) IV Bolus every 5 minutes PRN    Antimicrobials:

## 2025-02-28 NOTE — PROGRESS NOTE ADULT - ASSESSMENT
74-year-old male hx trach/vent - , CVA, COPD, stage renal disease on dialysis 4 times a week ([facility]) history of pressure ulcer.  Patient presents the ED today for elevated white count. nephrology consulted for dialysis needs    ESRD:  [facility]  On HD [facility] via an A-V fistula. s/p fistulogram by vascular 2/18  Continue MWF in hospital  consent from wife in dialysis unit  s/p hd 2/26 uf 2.5L tolerated well. hd today  monitor bmp    htn  better  continue norvasc 10 daily and hydralazine 25 tid  max uf as tolerated  monitor    Anemia:  Transfuse for Hg < 7.  s/p 1 unit PRBC 2/23  epo with hd  iron sat >20  monitor    leukocytosis  sepsis work up per team    hyponatremia  in setting of esrd and hyperglycemia  optimize dm control  hd per schedule with uF  monitor

## 2025-02-28 NOTE — PROGRESS NOTE ADULT - ASSESSMENT
75 y/o M PMhx trach/vent (last changed 10/23/24), CVA x2 with residual R hemiplegia, COPD, ESRD on HD MWF who presented to ED for leukocytosis when initially planned for fistulogram and noted to have a WBC of 18.     VAP, leukocytosis  SARS-CoV-2/ RSV/ flu PCR negative  MRSA PCR negative  CXR- Right lower lobe infiltrate  blood cultures- NGTD  resp cx w/  pseudomonas  s/p cefepime x 7 days, completed 2/25  discussed w/ RICU team, bronch appeared clear  noted bronch cx w/ pseudomonas, suspect trach is colonized w/ pseudomonas    UTI- treated  UA w/ significant pyuria though noted epithelial cells indicating contamination  unable to assess urinary symptoms given patient's mentation  repeat urine cx also w/ E faecium  s/p vanc course, completed 2/25    DVT  RUE US- age-indeterminate, non-occlusive deep vein thrombosis noted in the right brachial vein  on heparin gtt    ESRD  HD per nephrology    leukocytosis  possibly reactive    Recommendations  monitor off antibiotics  aspiration precautions  contact isolation for candida auris    Dr. Bell Wheatley will be covering 3/1 & 3/2. I will resume care on 3/3   Steven Torres M.D.  [facility]  [phone]  After 5pm on weekdays and all day on weekends - please call [phone]  Available on [employer] teams    Thank you for consulting us and involving us in the management of this patients case. In addition to reviewing history, imaging, documents, labs, microbiology, took into account antibiotic stewardship, local antibiogram and infection control strategies and potential transmission issues.

## 2025-02-28 NOTE — PROGRESS NOTE ADULT - NS ATTEND AMEND GEN_ALL_CORE FT
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 c/b acute hypoxemic and hypercapnic respiratory failure s/p ETT intubation/MV with failure to wean from ventilator s/p tracheostomy with hospital course further complicated by aspiration and B cereus bacteremia and RLE DVT followed by citrobacter PNA, GIB i/s/o AOCD, and fistula stenosis s/p fistulogram 6/4 ultimately transferred to acute rehab, decannulated, and discharged home until 10/2024 when pt returned to OSH with aspiration PNA c/b hypoxemic respiratory failure requiring ETT intubation/MV followed by tracheostomy placement and d/c to [facility] ([facility]) 11/2024 now presenting to [facility] on [facility]/18[facility]25 for RUE fistulogram/angioplasty with vascular sx but with hospital course c/b concern fo recurrent PNA with uncontrolled hyperglycemia admitted to RCU for further medical management.      Pt p/w acute encephalopathy i/s/o previously known left talya and left cerebellum ischemic infarct (no vessel occlusion/stenosis) with residual right hemiplegia. Pt remains obtunded, but intermittently answers appropriately to questions/basic commands. W/u for secondary encephalopathy otherwise NTD. Continue on NOAC and statin. PT/OT following.     Pt with acute combined hypoxemic and hypercapnic respiratory failure s/p ETT intubation/MV with failure to wean from ventilator requiring repeat tracheostomy placement (1st time IP 5/14/24~7/2024 with successful decannulation, 2nd time OSH ~10/2024). Pt now vent dependent, will attempt weaning trials as tolerated. Trach care and suctioning as per RCU team. Oral hygiene and aspiration precautions in place. Wean O2 supplementation for goal O2 saturation 90-95%. Airway clearance therapy in place. CTA performed 2/24 2/2 acute hypoxemic i/s/o known VTEs (-) PE but with significant mucous burden with associated atelectasis. IPV added to regimen. Overnight pt with 2 episodes of hemoptysis with blood clots suctioned i/s/o supratherapeutic heparin drip. Heparin held, bedside bronchoscopy performed. No evidence of active bleeding or DAH but pt with profuse thick mucous in multiple lobes, especially on left side. BAL sent for cultures. Ventilator compliance improving, will c/w current treatment. No further episodes of hemoptysis.    Pt with oropharyngeal dysphagia s/p PEG placement now tolerating feeds at goal rate. Pt with previous hospitalization with concern for melena on 5/26, now stable with no further events. Will CTM carefully while on full A/C. PPI QD. CBC q24hr. Bowel regimen prn. Pt further developed new pressure ulcers found on hospital arrival, likely while at LTCD. Wound consult placed, recs appreciated.    Pt with ESRD via right AVF found to have stenosis requiring temporary access on previous admission s/p RUE fistuloplasty (6/4) now returning for RUE fistulogram and angioplasty (2/18) but found to have severe hyperglycemia and leukocytosis requiring admission. Right brachial vein noted to have acute DVT. A/C initiated with heparin drip, but with persistently subtherapeutic pTTs found to have high anti-Xa levels concerning for heparin resistance, now monitoring with anti-Xa levels. Trial of DVT ppx. If does well, will discuss with family option of NOAC.     Dispo pending medical optimization. Pt full code. DVT ppx in place. Pt found on this admission to have (+) candida auris colonization (as per wife, pt's neighbor at LTCF was recently found to have it and pt was moved out of room at that time.) Will continue to update family and discuss plan of care throughout hospitalization, discussed at length with pt's wife and daughter at bedside today.

## 2025-02-28 NOTE — PROGRESS NOTE ADULT - SUBJECTIVE AND OBJECTIVE BOX
RCU PROGRESS NOTE     CHIEF COMPLAINT: Patient is a 74y old  Male who presents with a chief complaint of leukocytosis (20 Feb 2025 13:41)      INTERVAL EVENTS:    REVIEW OF SYSTEMS: Seen by bedside during AM rounds and     Mode: AC/ CMV (Assist Control/ Continuous Mandatory Ventilation), RR (machine): 15, TV (machine): 400, FiO2: 50, PEEP: 5, ITime: 0.66, MAP: 8, PIP: 29      OBJECTIVE:  ICU Vital Signs Last 24 Hrs  T(C): 36.7 (28 Feb 2025 04:00), Max: 37.2 (28 Feb 2025 00:00)  T(F): 98 (28 Feb 2025 04:00), Max: 98.9 (28 Feb 2025 00:00)  HR: 88 (28 Feb 2025 04:00) (83 - 110)  BP: 152/60 (28 Feb 2025 04:00) (143/67 - 172/78)  BP(mean): 85 (28 Feb 2025 04:00) (85 - 106)  ABP: --  ABP(mean): --  RR: 15 (28 Feb 2025 04:00) (15 - 21)  SpO2: 100% (28 Feb 2025 04:00) (100% - 100%)    O2 Parameters below as of 28 Feb 2025 04:00  Patient On (Oxygen Delivery Method): ventilator    O2 Concentration (%): 40      Mode: AC/ CMV (Assist Control/ Continuous Mandatory Ventilation), RR (machine): 15, TV (machine): 400, FiO2: 50, PEEP: 5, ITime: 0.66, MAP: 8, PIP: 29    02-27 @ 07:01  -  02-28 @ 07:00  --------------------------------------------------------  IN: 1360 mL / OUT: 50 mL / NET: 1310 mL      CAPILLARY BLOOD GLUCOSE      POCT Blood Glucose.: 315 mg/dL (28 Feb 2025 05:38)      HOSPITAL MEDICATIONS:  MEDICATIONS  (STANDING):  albuterol/ipratropium for Nebulization 3 milliLiter(s) Nebulizer every 6 hours  amLODIPine   Tablet 10 milliGRAM(s) Oral daily  AQUAPHOR (petrolatum Ointment) 1 Application(s) Topical two times a day  atorvastatin 20 milliGRAM(s) Oral at bedtime  chlorhexidine 0.12% Liquid 15 milliLiter(s) Oral Mucosa every 12 hours  chlorhexidine 2% Cloths 1 Application(s) Topical daily  dextrose 5%. 1000 milliLiter(s) (50 mL/Hr) IV Continuous <Continuous>  dextrose 5%. 1000 milliLiter(s) (100 mL/Hr) IV Continuous <Continuous>  dextrose 50% Injectable 25 Gram(s) IV Push once  dextrose 50% Injectable 12.5 Gram(s) IV Push once  dextrose 50% Injectable 25 Gram(s) IV Push once  epoetin reilly-epbx (RETACRIT) Injectable 33677 Unit(s) IV Push <User Schedule>  ferrous    sulfate Liquid 300 milliGRAM(s) Enteral Tube daily  glucagon  Injectable 1 milliGRAM(s) IntraMuscular once  heparin   Injectable 5000 Unit(s) SubCutaneous every 8 hours  hydrALAZINE 25 milliGRAM(s) Oral three times a day  insulin lispro (ADMELOG) corrective regimen sliding scale   SubCutaneous every 6 hours  Nephro-noam 1 Tablet(s) Oral daily  pantoprazole   Suspension 40 milliGRAM(s) Oral before breakfast    MEDICATIONS  (PRN):  dextrose Oral Gel 15 Gram(s) Oral once PRN Blood Glucose LESS THAN 70 milliGRAM(s)/deciliter  sodium chloride 0.9% Bolus. 100 milliLiter(s) IV Bolus every 5 minutes PRN SBP LESS THAN or EQUAL to 80 mmHg      PHYSICAL EXAMINATION    LABS:                        9.8    17.98 )-----------( 196      ( 27 Feb 2025 05:00 )             32.1     02-27    129[L]  |  87[L]  |  37[H]  ----------------------------<  141[H]  3.7   |  28  |  2.03[H]    Ca    9.0      27 Feb 2025 05:00  Phos  2.1     02-27  Mg     2.80     02-27        Urinalysis Basic - ( 27 Feb 2025 05:00 )    Color: x / Appearance: x / SG: x / pH: x  Gluc: 141 mg/dL / Ketone: x  / Bili: x / Urobili: x   Blood: x / Protein: x / Nitrite: x   Leuk Esterase: x / RBC: x / WBC x   Sq Epi: x / Non Sq Epi: x / Bacteria: x            PAST MEDICAL & SURGICAL HISTORY:  ESRD on hemodialysis      HTN (hypertension)      Insulin dependent type 2 diabetes mellitus      History of cerebrovascular accident (CVA) with residual deficit      History of DVT of lower extremity      HLD (hyperlipidemia)      CVA (cerebrovascular accident)      Aphasia      Tracheostomy in place      PEG (percutaneous endoscopic gastrostomy) status      GERD (gastroesophageal reflux disease)      Constipation      Pressure ulcer      Arteriovenous fistula      S/P percutaneous endoscopic gastrostomy (PEG) tube placement      History of tracheostomy          FAMILY HISTORY:  FHx: diabetes mellitus (Father, Aunt)        Social History:      RADIOLOGY:  [ ] Reviewed and interpreted by me    PULMONARY FUNCTION TESTS:    EKG: RCU PROGRESS NOTE     CHIEF COMPLAINT: Patient is a 74y old  Male who presents with a chief complaint of leukocytosis (20 Feb 2025 13:41)      INTERVAL EVENTS:  - No interval events overnight   - FS trending up and NPH resumed   - No further hemoptysis noted and argatroban   - Family requesting palliative discussion     REVIEW OF SYSTEMS: Seen by bedside during AM rounds and unable to assess ROS second to vented     Mode: AC/ CMV (Assist Control/ Continuous Mandatory Ventilation), RR (machine): 15, TV (machine): 400, FiO2: 50, PEEP: 5, ITime: 0.66, MAP: 8, PIP: 29      OBJECTIVE:  ICU Vital Signs Last 24 Hrs  T(C): 36.7 (28 Feb 2025 04:00), Max: 37.2 (28 Feb 2025 00:00)  T(F): 98 (28 Feb 2025 04:00), Max: 98.9 (28 Feb 2025 00:00)  HR: 88 (28 Feb 2025 04:00) (83 - 110)  BP: 152/60 (28 Feb 2025 04:00) (143/67 - 172/78)  BP(mean): 85 (28 Feb 2025 04:00) (85 - 106)  ABP: --  ABP(mean): --  RR: 15 (28 Feb 2025 04:00) (15 - 21)  SpO2: 100% (28 Feb 2025 04:00) (100% - 100%)    O2 Parameters below as of 28 Feb 2025 04:00  Patient On (Oxygen Delivery Method): ventilator    O2 Concentration (%): 40      Mode: AC/ CMV (Assist Control/ Continuous Mandatory Ventilation), RR (machine): 15, TV (machine): 400, FiO2: 50, PEEP: 5, ITime: 0.66, MAP: 8, PIP: 29    02-27 @ 07:01  -  02-28 @ 07:00  --------------------------------------------------------  IN: 1360 mL / OUT: 50 mL / NET: 1310 mL      CAPILLARY BLOOD GLUCOSE  POCT Blood Glucose.: 315 mg/dL (28 Feb 2025 05:38)      HOSPITAL MEDICATIONS:  MEDICATIONS  (STANDING):  albuterol/ipratropium for Nebulization 3 milliLiter(s) Nebulizer every 6 hours  amLODIPine   Tablet 10 milliGRAM(s) Oral daily  AQUAPHOR (petrolatum Ointment) 1 Application(s) Topical two times a day  atorvastatin 20 milliGRAM(s) Oral at bedtime  chlorhexidine 0.12% Liquid 15 milliLiter(s) Oral Mucosa every 12 hours  chlorhexidine 2% Cloths 1 Application(s) Topical daily  dextrose 5%. 1000 milliLiter(s) (50 mL/Hr) IV Continuous <Continuous>  dextrose 5%. 1000 milliLiter(s) (100 mL/Hr) IV Continuous <Continuous>  dextrose 50% Injectable 25 Gram(s) IV Push once  dextrose 50% Injectable 12.5 Gram(s) IV Push once  dextrose 50% Injectable 25 Gram(s) IV Push once  epoetin reilly-epbx (RETACRIT) Injectable 53735 Unit(s) IV Push <User Schedule>  ferrous    sulfate Liquid 300 milliGRAM(s) Enteral Tube daily  glucagon  Injectable 1 milliGRAM(s) IntraMuscular once  heparin   Injectable 5000 Unit(s) SubCutaneous every 8 hours  hydrALAZINE 25 milliGRAM(s) Oral three times a day  insulin lispro (ADMELOG) corrective regimen sliding scale   SubCutaneous every 6 hours  Nephro-noam 1 Tablet(s) Oral daily  pantoprazole   Suspension 40 milliGRAM(s) Oral before breakfast    MEDICATIONS  (PRN):  dextrose Oral Gel 15 Gram(s) Oral once PRN Blood Glucose LESS THAN 70 milliGRAM(s)/deciliter  sodium chloride 0.9% Bolus. 100 milliLiter(s) IV Bolus every 5 minutes PRN SBP LESS THAN or EQUAL to 80 mmHg      PHYSICAL EXAMINATION  General: NAD   HEENT: Trach present   Cards: S1/S2, no murmurs   Pulm: Course vent sounds bilaterally. No wheezes.   Abdomen: Soft, nondistended and nontender. PEG present.   Extremities: No pedal edema. Active THAI of left upper and lower extremities but severe weakness. Baseline R hemiparesis second to CVA hx.   Neurology: Awakens, however refuses interact with no acute focal neurological deficits     LABS:                        9.8    17.98 )-----------( 196      ( 27 Feb 2025 05:00 )             32.1     02-27    129[L]  |  87[L]  |  37[H]  ----------------------------<  141[H]  3.7   |  28  |  2.03[H]    Ca    9.0      27 Feb 2025 05:00  Phos  2.1     02-27  Mg     2.80     02-27        Urinalysis Basic - ( 27 Feb 2025 05:00 )  Color: x / Appearance: x / SG: x / pH: x  Gluc: 141 mg/dL / Ketone: x  / Bili: x / Urobili: x   Blood: x / Protein: x / Nitrite: x   Leuk Esterase: x / RBC: x / WBC x   Sq Epi: x / Non Sq Epi: x / Bacteria: x            PAST MEDICAL & SURGICAL HISTORY:  ESRD on hemodialysis      HTN (hypertension)      Insulin dependent type 2 diabetes mellitus      History of cerebrovascular accident (CVA) with residual deficit      History of DVT of lower extremity      HLD (hyperlipidemia)      CVA (cerebrovascular accident)      Aphasia      Tracheostomy in place      PEG (percutaneous endoscopic gastrostomy) status      GERD (gastroesophageal reflux disease)      Constipation      Pressure ulcer      Arteriovenous fistula      S/P percutaneous endoscopic gastrostomy (PEG) tube placement      History of tracheostomy          FAMILY HISTORY:  FHx: diabetes mellitus (Father, Aunt)        Social History:      RADIOLOGY:  [ ] Reviewed and interpreted by me    PULMONARY FUNCTION TESTS:    EKG:

## 2025-02-28 NOTE — PROGRESS NOTE ADULT - SUBJECTIVE AND OBJECTIVE BOX
[facility] [place] [facility]   MD ELIANE BHAGAT MD ANGELA WONG, PA    TEL:  OFFICE: [phone]  From 5pm-7am Answering Service [phone]    -- RENAL FOLLOW UP NOTE ---Date of Service 02-28-25 @ 10:49    Patient is a 74y old  Male who presents with a chief complaint of leukocytosis (20 Feb 2025 13:41)      Patient seen and examined at bedside.     VITALS:  T(F): 98.1 (02-28-25 @ 08:00), Max: 98.9 (02-28-25 @ 00:00)  HR: 83 (02-28-25 @ 09:14)  BP: 158/65 (02-28-25 @ 08:00)  RR: 15 (02-28-25 @ 08:00)  SpO2: 100% (02-28-25 @ 09:14)  Wt(kg): --    02-27 @ 07:01  -  02-28 @ 07:00  --------------------------------------------------------  IN: 1360 mL / OUT: 50 mL / NET: 1310 mL          PHYSICAL EXAM:  General: nonverbal  Neck: + trach  Respiratory: + rhonchi  Cardiovascular: S1, S2, RRR  Gastrointestinal: +peg  Extremities: No peripheral edema    Hospital Medications:   MEDICATIONS  (STANDING):  albuterol/ipratropium for Nebulization 3 milliLiter(s) Nebulizer every 6 hours  amLODIPine   Tablet 10 milliGRAM(s) Oral daily  AQUAPHOR (petrolatum Ointment) 1 Application(s) Topical two times a day  atorvastatin 20 milliGRAM(s) Oral at bedtime  chlorhexidine 0.12% Liquid 15 milliLiter(s) Oral Mucosa every 12 hours  chlorhexidine 2% Cloths 1 Application(s) Topical daily  dextrose 5%. 1000 milliLiter(s) (50 mL/Hr) IV Continuous <Continuous>  dextrose 5%. 1000 milliLiter(s) (100 mL/Hr) IV Continuous <Continuous>  dextrose 50% Injectable 25 Gram(s) IV Push once  dextrose 50% Injectable 12.5 Gram(s) IV Push once  dextrose 50% Injectable 25 Gram(s) IV Push once  epoetin reilly-epbx (RETACRIT) Injectable 17496 Unit(s) IV Push <User Schedule>  ferrous    sulfate Liquid 300 milliGRAM(s) Enteral Tube daily  glucagon  Injectable 1 milliGRAM(s) IntraMuscular once  hydrALAZINE 25 milliGRAM(s) Oral three times a day  insulin lispro (ADMELOG) corrective regimen sliding scale   SubCutaneous every 6 hours  insulin NPH human recombinant 4 Unit(s) SubCutaneous every 6 hours  Nephro-noam 1 Tablet(s) Oral daily  pantoprazole   Suspension 40 milliGRAM(s) Oral before breakfast      LABS:  02-27    129[L]  |  87[L]  |  37[H]  ----------------------------<  141[H]  3.7   |  28  |  2.03[H]    Ca    9.0      27 Feb 2025 05:00  Phos  2.1     02-27  Mg     2.80     02-27      Creatinine Trend: 2.03 <--, 2.59 <--, 1.78 <--, 3.10 <--, 2.49 <--, 1.65 <--, 1.39 <--                                9.8    17.98 )-----------( 196      ( 27 Feb 2025 05:00 )             32.1     Urine Studies:  Urinalysis - [02-27-25 @ 05:00]      Color  / Appearance  / SG  / pH       Gluc 141 / Ketone   / Bili  / Urobili        Blood  / Protein  / Leuk Est  / Nitrite       RBC  / WBC  / Hyaline  / Gran  / Sq Epi  / Non Sq Epi  / Bacteria       Iron 46, TIBC 142, %sat 32      [02-19-25 @ 11:39]  Ferritin 3601      [02-19-25 @ 11:39]  PTH -- (Ca --)      [05-26-24 @ 01:20]   122  TSH 1.660      [10-05-24 @ 08:37]  Lipid: chol --, , HDL --, LDL --      [10-18-24 @ 06:00]    HBsAb 654.8      [02-19-25 @ 19:05]  HBsAg Nonreact      [02-19-25 @ 19:05]  HBcAb Nonreact      [02-19-25 @ 19:05]  HCV 0.19, Nonreact      [02-19-25 @ 19:05]      RADIOLOGY & ADDITIONAL STUDIES:

## 2025-02-28 NOTE — PROGRESS NOTE ADULT - ASSESSMENT
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 Patent recently admitted in 6/2024 for left pontine and cerebellar CVA requiring tracheostomy. While at [facility] patient decannulated and passed SS with advanced diet to easy to chew with thin liquids, but complicated COVID requiring transfer to [facility] in 7/2024 and then ultimately discharged home. Per wife patient was doing well at home until 10/2024 when he was found with RLL with concern for aspiration requiring intubation, then tracheostomy, and ultimately discharged to NH with vent dependence in 11/2024. Patient now presented for RUE fistulogram and angioplasty with vascular, however course complicated by leukocytosis with concern for recurrent trachitis vs PNA and UTI, AOCD and hyperglycemia. Admitted to RCU for further management. Found with  PSA trachitis/ PNA and enterococcus faecium UTI. Course complicated by RUE brachial DVT and hypoxia with HD with concern for volume down vs PE?      NEUROLOGY  # Poor mentation second to metabolic encephalopathy vs psychosomatic/ depression   - Hx of L cerebellar and pontene embolic CVA x 2 with residual R hemiplegia   - AOx0, bedbound, and nonverbal at baseline per report, however per exam patient able to open eyes, able to follow commands on left (LUE>LLE) with SEVERE weakness, however noted with R hemiplegia per baseline  - Nods yes and no occasionally however keeps eyes closed likely psychosomatic vs metabolic encephalopathy with infections?   - Last CTH in 10/2024 with no acute findings   - Hold [facility] CT for now   - Monitor mentation     CARDIOVASCULAR  # Hx of HTN and HLD  - Continue on hydral 25 and norvasc 10   - Monitor BP     # Incidental Pericardial effusion   - Incidental small pericardial effusion seen adjacent to right ventricle, however IVC appears flat and LE without edema with low concern for RV failure.   - Prior TTE reviewed from 4/2024 with normal RVLVSF with no pericardial effusion noted at the time.   - TTE 2/23 with EF 65, normal LVRVSF, mild LAD, normal RA, mild pulmonary hypertension, and small pericardial effusion noted adjacent to the anterior right ventricle with no echocardiographic evidence of tamponade  - Monitor hemodynamics     RESPIRATORY  # Respiratory failure second to PNA vs volume down vs PE?   - Hx of COPD with chronic respiratory failure with tracheostomy and vent dependence  - Admitted for angioplasty of RUE AVF, however course complicated by leukocytosis with concern for recurrent trachitis/ PNA.   - Course complicated by hypoxia during HD likely volume down vs migration of RUE brachial DVT with PE?   - Held volume removal, bolus given, and hypoxia improved.   - CTA CHEST without PE  - Continue on vent 15/400/50  - Hold PS for now  - Continue on nebs     HEENT   # Hemoptysis   - Wife reported hemoptysis while at nursing home 2 weeks prior to admission   - No hemoptysis noted on admission   - Hemoptysis returned in house   - CTA CHEST 2/24 without PE  - Bronch 2/26 with no evidence of bleeding   - Bleeding resolved   - Discussed with wife and will challenge with argatroban for below DVT  - Monitor for now    GI  # Dysphagia with PEG   - Passed SS with advanced diet to easy to chew with thin liquids in 7/2024, however since recurrent aspiration in 10/2024 now with PEG/ vent dependence   - Continue on PEG TF   - Monitor BMs    RENAL  # ESRD on HD MTTHF   # AVF trouble  # Dehydration   - Presented for RUE fistulogram and angioplasty with vascular on 2/18   - Resumed on HD with no flow issue from AVF   - Course complicated by hypoxia with concern for volume down given small IVC and elevated lactate on 2/21  - Volume removal held, lactate improved, and hypoxia improved.   - Monitor volume status and continue on HD MTTF per renal   - Monitor renal function, electrolytes and UOP     # Hyponatremia likely volume down   - Concern for volume issues, however overall appears volume down with serum osmo 307  - Sodium 128 and improved with HD/ bolus during HD.   - Trend sodium with HD for now     # Elevated lactate   - Lactate elevated likely second to volume down?   - Post bolus lactate trending down from 3.3 to 2.7 to 1.3    INFECTIOUS DISEASE  # Hemoptysis   - Hemoptysis as above   - BAL with PSA as prior   - No fever and monitor off ABX     # Trachitis vs PNA/ UTI   - Hx of steno and PSA in sputum   - Flu/ COVID negative   - MRSA negative   - UCx with enterococcus   - SCx with  PSA   - Found with leukocytosis and started on cefepime and christiano (2/19) and vanco on HD days (2/21).   - No further steno seen in sputum and continue on cefepime (2/19 - 2/25) and vanco on HD days (2/21, 2/22 - 2/25)   - WBC increased likely reactive to hypoxic event and now improving  - ID following observe off abx     # PPX   - MRSA PCR negative  - Candida auris PCR POSITIVE  - Continue on isolation precautions per IC    HEME  # AOCD   - Presented for angioplasty and found with anemia to 6.7 s/p 1U PRBC 2/19 and 1/2 PRBC 2/23   - Anemia panel with concern for AOCD   - Continue on EPO QIW and Fe   - Monitor HH     # Heparin resistance?   - PTT persistently low despite increasing heparin GTT   - Resume on heparin GTT and anti Xa level supra therapeutic on but PTT remained low  - Patient ultimately with concern for heparin resistance    VASCULAR  # RUE DVT   - RUE edema noted with age-indeterminate, non-occlusive deep vein thrombosis noted in the right brachial vein.   - Case discussed with vascular who recommends full AC   - CTH from prior with encephalomalcia, however no hx of intracranial bleed   - Prior UGIB while on AC, however discharged on eliquis in 7/2024 and completed 6 month course for RLE DVT   - Continue on heparin GTT, however held for hemoptysis   - Hemoptysis now resolved and given resistance to heparin resumed on argatroban     ENDOCRINE  # DM2 A1C 14.0 (1/2024) > 6.4 (2/2025)  - Presented with hyperglycemia and continued on lantus and ISS   - Increase lantus to 28U QHS and ISS, however FS continues to trend in 200s   - Adjust insulin to NPH 16U Q6H (1200, 1800 and 0000) with tube feeds (3140-0990)    - Monitor FS    ETHICS/ GOC    - FULL CODE   - Wife wishes for palliative discussion   - Pending palliative consult    DISPO - Back to NH when able   75 YO M with PMHx of COPD, 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 Patent recently admitted in 6/2024 for left pontine and cerebellar CVA requiring tracheostomy. While at [facility] patient decannulated and passed SS with advanced diet to easy to chew with thin liquids, but complicated COVID requiring transfer to [facility] in 7/2024 and then ultimately discharged home. Per wife patient was doing well at home until 10/2024 when he was found with RLL with concern for aspiration requiring intubation, then tracheostomy, and ultimately discharged to NH with vent dependence in 11/2024. Patient now presented for RUE fistulogram and angioplasty with vascular, however course complicated by leukocytosis with concern for recurrent trachitis vs PNA and UTI, AOCD and hyperglycemia. Admitted to RCU for further management. Found with  PSA trachitis/ PNA and enterococcus faecium UTI. Course complicated by RUE brachial DVT and hypoxia with HD with concern for volume down vs PE?      NEUROLOGY  # Poor mentation second to metabolic encephalopathy vs psychosomatic/ depression   - Hx of L cerebellar and pontene embolic CVA x 2 with residual R hemiplegia   - AOx0, bedbound, and nonverbal at baseline per report, however per exam patient able to open eyes, able to follow commands on left (LUE>LLE) with SEVERE weakness, however noted with R hemiplegia per baseline  - Nods yes and no occasionally however keeps eyes closed likely psychosomatic vs metabolic encephalopathy with infections?   - Last CTH in 10/2024 with no acute findings   - Hold [facility] CT for now   - Monitor mentation     CARDIOVASCULAR  # Hx of HTN and HLD  - Continue on hydral 25 and norvasc 10   - Monitor BP     # Incidental Pericardial effusion   - Incidental small pericardial effusion seen adjacent to right ventricle, however IVC appears flat and LE without edema with low concern for RV failure.   - Prior TTE reviewed from 4/2024 with normal RVLVSF with no pericardial effusion noted at the time.   - TTE 2/23 with EF 65, normal LVRVSF, mild LAD, normal RA, mild pulmonary hypertension, and small pericardial effusion noted adjacent to the anterior right ventricle with no echocardiographic evidence of tamponade  - Monitor hemodynamics     RESPIRATORY  # Respiratory failure second to PNA vs volume down vs PE?   - Hx of COPD with chronic respiratory failure with tracheostomy and vent dependence  - Admitted for angioplasty of RUE AVF, however course complicated by leukocytosis with concern for recurrent trachitis/ PNA.   - Course complicated by hypoxia during HD likely volume down vs migration of RUE brachial DVT with PE?   - Held volume removal, bolus given, and hypoxia improved.   - CTA CHEST without PE  - Continue on vent 15/400/50  - Hold PS for now  - Continue on nebs     HEENT   # Hemoptysis   - Wife reported hemoptysis while at nursing home 2 weeks prior to admission   - No hemoptysis noted on admission   - Hemoptysis returned in house   - CTA CHEST 2/24 without PE  - Bronch 2/26 with no evidence of bleeding   - Bleeding resolved   - Discussed with wife and will challenge with argatroban for below DVT  - Monitor for now    GI  # Dysphagia with PEG   - Passed SS with advanced diet to easy to chew with thin liquids in 7/2024, however since recurrent aspiration in 10/2024 now with PEG/ vent dependence   - Continue on PEG TF and changed to bolus feeds   - Monitor tolerance     RENAL  # ESRD on HD MTTHF   # AVF trouble  # Dehydration   - Presented for RUE fistulogram and angioplasty with vascular on 2/18   - Resumed on HD with no flow issue from AVF   - Course complicated by hypoxia with concern for volume down given small IVC and elevated lactate on 2/21  - Volume removal held, lactate improved, and hypoxia improved.   - Monitor volume status and continue on HD MTTF per renal   - Monitor renal function, electrolytes and UOP     # Hyponatremia likely volume down   - Concern for volume issues, however overall appears volume down with serum osmo 307  - Sodium 128 and improved with HD/ bolus during HD.   - Trend sodium with HD for now     # Elevated lactate   - Lactate elevated likely second to volume down?   - Post bolus lactate trending down from 3.3 to 2.7 to 1.3    INFECTIOUS DISEASE  # Hemoptysis   - Hemoptysis as above   - BAL with PSA as prior   - No fever and monitor off ABX     # Trachitis vs PNA/ UTI   - Hx of steno and PSA in sputum   - Flu/ COVID negative   - MRSA negative   - UCx with enterococcus   - SCx with  PSA   - Found with leukocytosis and started on cefepime and christiano (2/19) and vanco on HD days (2/21).   - No further steno seen in sputum and continue on cefepime (2/19 - 2/25) and vanco on HD days (2/21, 2/22 - 2/25)   - WBC increased likely reactive to hypoxic event and now improving  - ID following observe off abx     # PPX   - MRSA PCR negative  - Candida auris PCR POSITIVE  - Continue on isolation precautions per IC    HEME  # AOCD   - Presented for angioplasty and found with anemia to 6.7 s/p 1U PRBC 2/19 and 1/2 PRBC 2/23   - Anemia panel with concern for AOCD   - Continue on EPO QIW and Fe   - Monitor HH     # Heparin resistance?   - PTT persistently low despite increasing heparin GTT   - Resume on heparin GTT and anti Xa level supra therapeutic on but PTT remained low  - Patient ultimately with concern for heparin resistance    VASCULAR  # RUE DVT   - RUE edema noted with age-indeterminate, non-occlusive deep vein thrombosis noted in the right brachial vein.   - Case discussed with vascular who recommends full AC   - CTH from prior with encephalomalcia, however no hx of intracranial bleed   - Prior UGIB while on AC, however discharged on eliquis in 7/2024 and completed 6 month course for RLE DVT   - Continue on heparin GTT, however held for hemoptysis   - Hemoptysis now resolved and given resistance to heparin resumed on argatroban     ENDOCRINE  # DM2 A1C 14.0 (1/2024) > 6.4 (2/2025)  - Presented with hyperglycemia and continued on lantus and ISS   - Increase lantus to 28U QHS and ISS, however FS continues to trend in 200s   - Adjust insulin to NPH 16U Q6H (1200, 1800 and 0000) with tube feeds (3730-5093) and FS improved, however TF held for bronchoscopy and FS dropped   - NPH held and now resumed at 4U Q6H with bolus feeds     ETHICS/ GOC    - FULL CODE   - Wife wishes for palliative discussion   - Pending palliative consult    DISPO - Back to NH when able

## 2025-03-01 LAB
ANION GAP SERPL CALC-SCNC: 16 MMOL/L — HIGH (ref 7–14)
APTT BLD: 40.1 SEC — HIGH (ref 24.5–35.6)
APTT BLD: 47.8 SEC — HIGH (ref 24.5–35.6)
APTT BLD: 50.4 SEC — HIGH (ref 24.5–35.6)
APTT BLD: 87.7 SEC — HIGH (ref 24.5–35.6)
BUN SERPL-MCNC: 41 MG/DL — HIGH (ref 7–23)
CALCIUM SERPL-MCNC: 9.2 MG/DL — SIGNIFICANT CHANGE UP (ref 8.4–10.5)
CHLORIDE SERPL-SCNC: 94 MMOL/L — LOW (ref 98–107)
CO2 SERPL-SCNC: 25 MMOL/L — SIGNIFICANT CHANGE UP (ref 22–31)
EGFR: 34 ML/MIN/1.73M2 — LOW
EGFR: 34 ML/MIN/1.73M2 — LOW
GLUCOSE BLDC GLUCOMTR-MCNC: 211 MG/DL — HIGH (ref 70–99)
GLUCOSE BLDC GLUCOMTR-MCNC: 255 MG/DL — HIGH (ref 70–99)
GLUCOSE BLDC GLUCOMTR-MCNC: 256 MG/DL — HIGH (ref 70–99)
GLUCOSE BLDC GLUCOMTR-MCNC: 319 MG/DL — HIGH (ref 70–99)
GLUCOSE SERPL-MCNC: 205 MG/DL — HIGH (ref 70–99)
HCT VFR BLD CALC: 25.5 % — LOW (ref 39–50)
HGB BLD-MCNC: 8 G/DL — LOW (ref 13–17)
INR BLD: 1.52 RATIO — HIGH (ref 0.85–1.16)
MAGNESIUM SERPL-MCNC: 2.6 MG/DL — SIGNIFICANT CHANGE UP (ref 1.6–2.6)
MCHC RBC-ENTMCNC: 24 PG — LOW (ref 27–34)
MCHC RBC-ENTMCNC: 31.4 G/DL — LOW (ref 32–36)
MCV RBC AUTO: 76.3 FL — LOW (ref 80–100)
NRBC # BLD AUTO: 0.02 K/UL — HIGH (ref 0–0)
NRBC BLD AUTO-RTO: 0 /100 WBCS — SIGNIFICANT CHANGE UP (ref 0–0)
PHOSPHATE SERPL-MCNC: 1.5 MG/DL — LOW (ref 2.5–4.5)
PLATELET # BLD AUTO: 241 K/UL — SIGNIFICANT CHANGE UP (ref 150–400)
POTASSIUM SERPL-MCNC: 3.8 MMOL/L — SIGNIFICANT CHANGE UP (ref 3.5–5.3)
POTASSIUM SERPL-SCNC: 3.8 MMOL/L — SIGNIFICANT CHANGE UP (ref 3.5–5.3)
PROTHROM AB SERPL-ACNC: 18 SEC — HIGH (ref 9.9–13.4)
RBC # BLD: 3.34 M/UL — LOW (ref 4.2–5.8)
RBC # FLD: 18.5 % — HIGH (ref 10.3–14.5)
SODIUM SERPL-SCNC: 135 MMOL/L — SIGNIFICANT CHANGE UP (ref 135–145)
WBC # BLD: 15.52 K/UL — HIGH (ref 3.8–10.5)
WBC # FLD AUTO: 15.52 K/UL — HIGH (ref 3.8–10.5)

## 2025-03-01 PROCEDURE — 99233 SBSQ HOSP IP/OBS HIGH 50: CPT | Mod: FS

## 2025-03-01 PROCEDURE — 36569 INSJ PICC 5 YR+ W/O IMAGING: CPT

## 2025-03-01 RX ORDER — SODIUM PHOSPHATE,DIBASIC DIHYD
15 POWDER (GRAM) MISCELLANEOUS ONCE
Refills: 0 | Status: DISCONTINUED | OUTPATIENT
Start: 2025-03-01 | End: 2025-03-01

## 2025-03-01 RX ORDER — ARGATROBAN 100 MG/ML
1.5 INJECTION, SOLUTION INTRAVENOUS
Qty: 50 | Refills: 0 | Status: DISCONTINUED | OUTPATIENT
Start: 2025-03-01 | End: 2025-03-01

## 2025-03-01 RX ORDER — ARGATROBAN 100 MG/ML
1.2 INJECTION, SOLUTION INTRAVENOUS
Qty: 50 | Refills: 0 | Status: DISCONTINUED | OUTPATIENT
Start: 2025-03-01 | End: 2025-03-01

## 2025-03-01 RX ORDER — ARGATROBAN 100 MG/ML
1.25 INJECTION, SOLUTION INTRAVENOUS
Qty: 50 | Refills: 0 | Status: DISCONTINUED | OUTPATIENT
Start: 2025-03-01 | End: 2025-03-01

## 2025-03-01 RX ORDER — SOD PHOS DI, MONO/K PHOS MONO 250 MG
1 TABLET ORAL EVERY 4 HOURS
Refills: 0 | Status: COMPLETED | OUTPATIENT
Start: 2025-03-01 | End: 2025-03-01

## 2025-03-01 RX ORDER — ARGATROBAN 100 MG/ML
1.5 INJECTION, SOLUTION INTRAVENOUS
Qty: 50 | Refills: 0 | Status: DISCONTINUED | OUTPATIENT
Start: 2025-03-01 | End: 2025-03-02

## 2025-03-01 RX ADMIN — Medication 4 UNIT(S): at 17:22

## 2025-03-01 RX ADMIN — Medication 40 MILLIGRAM(S): at 06:26

## 2025-03-01 RX ADMIN — INSULIN LISPRO 4: 100 INJECTION, SOLUTION INTRAVENOUS; SUBCUTANEOUS at 17:21

## 2025-03-01 RX ADMIN — WHITE PETROLATUM 1 APPLICATION(S): 1 OINTMENT TOPICAL at 06:10

## 2025-03-01 RX ADMIN — Medication 300 MILLIGRAM(S): at 12:27

## 2025-03-01 RX ADMIN — AMLODIPINE BESYLATE 10 MILLIGRAM(S): 10 TABLET ORAL at 06:23

## 2025-03-01 RX ADMIN — Medication 25 MILLIGRAM(S): at 06:23

## 2025-03-01 RX ADMIN — Medication 1 APPLICATION(S): at 12:29

## 2025-03-01 RX ADMIN — Medication 4 UNIT(S): at 06:19

## 2025-03-01 RX ADMIN — ARGATROBAN 4.04 MICROGRAM(S)/KG/MIN: 100 INJECTION, SOLUTION INTRAVENOUS at 14:11

## 2025-03-01 RX ADMIN — IPRATROPIUM BROMIDE AND ALBUTEROL SULFATE 3 MILLILITER(S): .5; 2.5 SOLUTION RESPIRATORY (INHALATION) at 03:48

## 2025-03-01 RX ADMIN — ARGATROBAN 4.85 MICROGRAM(S)/KG/MIN: 100 INJECTION, SOLUTION INTRAVENOUS at 22:38

## 2025-03-01 RX ADMIN — IPRATROPIUM BROMIDE AND ALBUTEROL SULFATE 3 MILLILITER(S): .5; 2.5 SOLUTION RESPIRATORY (INHALATION) at 21:57

## 2025-03-01 RX ADMIN — ATORVASTATIN CALCIUM 20 MILLIGRAM(S): 80 TABLET, FILM COATED ORAL at 22:04

## 2025-03-01 RX ADMIN — Medication 1 TABLET(S): at 12:27

## 2025-03-01 RX ADMIN — Medication 4 UNIT(S): at 12:10

## 2025-03-01 RX ADMIN — Medication 4 UNIT(S): at 23:42

## 2025-03-01 RX ADMIN — Medication 15 MILLILITER(S): at 17:21

## 2025-03-01 RX ADMIN — ARGATROBAN 4.84 MICROGRAM(S)/KG/MIN: 100 INJECTION, SOLUTION INTRAVENOUS at 08:56

## 2025-03-01 RX ADMIN — WHITE PETROLATUM 1 APPLICATION(S): 1 OINTMENT TOPICAL at 17:21

## 2025-03-01 RX ADMIN — INSULIN LISPRO 2: 100 INJECTION, SOLUTION INTRAVENOUS; SUBCUTANEOUS at 06:19

## 2025-03-01 RX ADMIN — Medication 1 PACKET(S): at 12:15

## 2025-03-01 RX ADMIN — Medication 25 MILLIGRAM(S): at 22:04

## 2025-03-01 RX ADMIN — IPRATROPIUM BROMIDE AND ALBUTEROL SULFATE 3 MILLILITER(S): .5; 2.5 SOLUTION RESPIRATORY (INHALATION) at 16:32

## 2025-03-01 RX ADMIN — Medication 15 MILLILITER(S): at 06:09

## 2025-03-01 RX ADMIN — Medication 25 MILLIGRAM(S): at 12:27

## 2025-03-01 RX ADMIN — INSULIN LISPRO 3: 100 INJECTION, SOLUTION INTRAVENOUS; SUBCUTANEOUS at 23:42

## 2025-03-01 RX ADMIN — ARGATROBAN 3.88 MICROGRAM(S)/KG/MIN: 100 INJECTION, SOLUTION INTRAVENOUS at 01:01

## 2025-03-01 RX ADMIN — Medication 1 PACKET(S): at 16:03

## 2025-03-01 RX ADMIN — INSULIN LISPRO 3: 100 INJECTION, SOLUTION INTRAVENOUS; SUBCUTANEOUS at 12:10

## 2025-03-01 RX ADMIN — IPRATROPIUM BROMIDE AND ALBUTEROL SULFATE 3 MILLILITER(S): .5; 2.5 SOLUTION RESPIRATORY (INHALATION) at 10:39

## 2025-03-01 NOTE — PROGRESS NOTE ADULT - NS ATTEND AMEND GEN_ALL_CORE FT
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 c/b acute hypoxemic and hypercapnic respiratory failure s/p ETT intubation/MV with failure to wean from ventilator s/p tracheostomy with hospital course further complicated by aspiration and B cereus bacteremia and RLE DVT followed by citrobacter PNA, GIB i/s/o AOCD, and fistula stenosis s/p fistulogram 6/4 ultimately transferred to acute rehab, decannulated, and discharged home until 10/2024 when pt returned to OSH with aspiration PNA c/b hypoxemic respiratory failure requiring ETT intubation/MV followed by tracheostomy placement and d/c to LTCF ([facility]) 11/2024 now presenting to [facility] on 2/18/25 for RUE fistulogram/angioplasty with vascular sx but with hospital course c/b concern fo recurrent PNA with uncontrolled hyperglycemia admitted to RCU for further medical management.      Pt p/w acute encephalopathy i/s/o previously known left talya and left cerebellum ischemic infarct (no vessel occlusion/stenosis) with residual right hemiplegia. Pt remains obtunded, but intermittently answers appropriately to questions/basic commands. W/u for secondary encephalopathy otherwise NTD. Continue on NOAC and statin. PT/OT following.     Pt with acute combined hypoxemic and hypercapnic respiratory failure s/p ETT intubation/MV with failure to wean from ventilator requiring repeat tracheostomy placement (1st time IP 5/14/24~7/2024 with successful decannulation, 2nd time OSH ~10/2024). Pt now vent dependent, will attempt weaning trials as tolerated. Trach care and suctioning as per RCU team. Oral hygiene and aspiration precautions in place. Wean O2 supplementation for goal O2 saturation 90-95%. Airway clearance therapy in place. CTA performed 2/24 2/2 acute hypoxemic i/s/o known VTEs (-) PE but with significant mucous burden with associated atelectasis. IPV added to regimen. Overnight pt with 2 episodes of hemoptysis with blood clots suctioned i/s/o supratherapeutic heparin drip. Heparin held, bedside bronchoscopy performed. No evidence of active bleeding or DAH but pt with profuse thick mucous in multiple lobes, especially on left side. BAL sent for cultures. Ventilator compliance improving, will c/w current treatment. No further episodes of hemoptysis.    Pt with oropharyngeal dysphagia s/p PEG placement now tolerating feeds at goal rate. Pt with previous hospitalization with concern for melena on 5/26, now stable with no further events. Will CTM carefully while on full A/C. PPI QD. CBC q24hr. Bowel regimen prn. Pt further developed new pressure ulcers found on hospital arrival, likely while at LTCD. Wound consult placed, recs appreciated.    Pt with ESRD via right AVF found to have stenosis requiring temporary access on previous admission s/p RUE fistuloplasty (6/4) now returning for RUE fistulogram and angioplasty (2/18) but found to have severe hyperglycemia and leukocytosis requiring admission. Right brachial vein noted to have acute DVT. A/C initiated with heparin drip, but with persistently subtherapeutic pTTs found to have high anti-Xa levels concerning for heparin resistance with difficulty keeping therapeutic. Pt switched to argatroban drip with plan to transition to NOAC if tolerates.     Dispo pending medical optimization. Pt full code. DVT ppx in place. Pt found on this admission to have (+) candida auris colonization (as per wife, pt's neighbor at LTCF was recently found to have it and pt was moved out of room at that time.) Will continue to update family and discuss plan of care throughout hospitalization, discussed at length with pt's wife and daughter at bedside today.

## 2025-03-01 NOTE — PROGRESS NOTE ADULT - SUBJECTIVE AND OBJECTIVE BOX
CHIEF COMPLAINT: Patient is a 74y old  Male who presents with a chief complaint of leukocytosis (20 Feb 2025 13:41)      INTERVAL EVENTS:   - no acute overnight events, VSS, afebrile on ventilator  - continued on Argatroban GTT for DVT    ROS: Seen by bedside during AM rounds     OBJECTIVE:  ICU Vital Signs Last 24 Hrs  T(C): 37.3 (01 Mar 2025 04:00), Max: 37.3 (01 Mar 2025 00:00)  T(F): 99.2 (01 Mar 2025 04:00), Max: 99.2 (01 Mar 2025 04:00)  HR: 91 (01 Mar 2025 04:00) (83 - 124)  BP: 147/63 (01 Mar 2025 04:00) (136/58 - 186/81)  BP(mean): 86 (01 Mar 2025 04:00) (80 - 86)  ABP: --  ABP(mean): --  RR: 15 (01 Mar 2025 04:00) (13 - 23)  SpO2: 100% (01 Mar 2025 04:00) (95% - 100%)    O2 Parameters below as of 01 Mar 2025 04:00  Patient On (Oxygen Delivery Method): ventilator    O2 Concentration (%): 40      Mode: AC/ CMV (Assist Control/ Continuous Mandatory Ventilation), RR (machine): 15, TV (machine): 400, FiO2: 50, PEEP: 5, ITime: 0.66, MAP: 10, PIP: 24    02-28 @ 07:01  -  03-01 @ 07:00  --------------------------------------------------------  IN: 1760 mL / OUT: 2400 mL / NET: -640 mL      CAPILLARY BLOOD GLUCOSE      POCT Blood Glucose.: 211 mg/dL (01 Mar 2025 06:12)      PHYSICAL EXAM:  General:   HEENT:   Lymph Nodes:  Neck:   Respiratory:   Cardiovascular:   Abdomen:   Extremities:   Skin:   Neurological:  Psychiatry:    Mode: AC/ CMV (Assist Control/ Continuous Mandatory Ventilation)  RR (machine): 15  TV (machine): 400  FiO2: 50  PEEP: 5  ITime: 0.66  MAP: 10  PIP: 24      HOSPITAL MEDICATIONS:  MEDICATIONS  (STANDING):  albuterol/ipratropium for Nebulization 3 milliLiter(s) Nebulizer every 6 hours  amLODIPine   Tablet 10 milliGRAM(s) Oral daily  AQUAPHOR (petrolatum Ointment) 1 Application(s) Topical two times a day  atorvastatin 20 milliGRAM(s) Oral at bedtime  chlorhexidine 0.12% Liquid 15 milliLiter(s) Oral Mucosa every 12 hours  chlorhexidine 2% Cloths 1 Application(s) Topical daily  dextrose 5%. 1000 milliLiter(s) (100 mL/Hr) IV Continuous <Continuous>  dextrose 5%. 1000 milliLiter(s) (50 mL/Hr) IV Continuous <Continuous>  dextrose 50% Injectable 25 Gram(s) IV Push once  dextrose 50% Injectable 12.5 Gram(s) IV Push once  dextrose 50% Injectable 25 Gram(s) IV Push once  epoetin reilly-epbx (RETACRIT) Injectable 67316 Unit(s) IV Push <User Schedule>  ferrous    sulfate Liquid 300 milliGRAM(s) Enteral Tube daily  glucagon  Injectable 1 milliGRAM(s) IntraMuscular once  hydrALAZINE 25 milliGRAM(s) Oral three times a day  insulin lispro (ADMELOG) corrective regimen sliding scale   SubCutaneous every 6 hours  insulin NPH human recombinant 4 Unit(s) SubCutaneous every 6 hours  Nephro-noam 1 Tablet(s) Oral daily  pantoprazole   Suspension 40 milliGRAM(s) Oral before breakfast  sodium phosphate 15 milliMole(s)/250 mL IVPB 15 milliMole(s) IV Intermittent once    MEDICATIONS  (PRN):  dextrose Oral Gel 15 Gram(s) Oral once PRN Blood Glucose LESS THAN 70 milliGRAM(s)/deciliter  sodium chloride 0.9% Bolus. 100 milliLiter(s) IV Bolus every 5 minutes PRN SBP LESS THAN or EQUAL to 80 mmHg      LABS:                        8.0    15.52 )-----------( 241      ( 01 Mar 2025 06:05 )             25.5     03-01    135  |  94[L]  |  41[H]  ----------------------------<  205[H]  3.8   |  25  |  2.04[H]    Ca    9.2      01 Mar 2025 06:05  Phos  1.5     03-01  Mg     2.60     03-01      PT/INR - ( 28 Feb 2025 23:00 )   PT: 18.0 sec;   INR: 1.52 ratio         PTT - ( 01 Mar 2025 06:05 )  PTT:50.4 sec  Urinalysis Basic - ( 01 Mar 2025 06:05 )    Color: x / Appearance: x / SG: x / pH: x  Gluc: 205 mg/dL / Ketone: x  / Bili: x / Urobili: x   Blood: x / Protein: x / Nitrite: x   Leuk Esterase: x / RBC: x / WBC x   Sq Epi: x / Non Sq Epi: x / Bacteria: x         CHIEF COMPLAINT: Patient is a 74y old  Male who presents with a chief complaint of leukocytosis (20 Feb 2025 13:41)      INTERVAL EVENTS:   - no acute overnight events, VSS, afebrile on ventilator  - continued on Argatroban GTT for DVT    ROS: Seen by bedside during AM rounds     OBJECTIVE:  ICU Vital Signs Last 24 Hrs  T(C): 37.3 (01 Mar 2025 04:00), Max: 37.3 (01 Mar 2025 00:00)  T(F): 99.2 (01 Mar 2025 04:00), Max: 99.2 (01 Mar 2025 04:00)  HR: 91 (01 Mar 2025 04:00) (83 - 124)  BP: 147/63 (01 Mar 2025 04:00) (136/58 - 186/81)  BP(mean): 86 (01 Mar 2025 04:00) (80 - 86)  ABP: --  ABP(mean): --  RR: 15 (01 Mar 2025 04:00) (13 - 23)  SpO2: 100% (01 Mar 2025 04:00) (95% - 100%)    O2 Parameters below as of 01 Mar 2025 04:00  Patient On (Oxygen Delivery Method): ventilator    O2 Concentration (%): 40      Mode: AC/ CMV (Assist Control/ Continuous Mandatory Ventilation), RR (machine): 15, TV (machine): 400, FiO2: 50, PEEP: 5, ITime: 0.66, MAP: 10, PIP: 24    02-28 @ 07:01  -  03-01 @ 07:00  --------------------------------------------------------  IN: 1760 mL / OUT: 2400 mL / NET: -640 mL      CAPILLARY BLOOD GLUCOSE      POCT Blood Glucose.: 211 mg/dL (01 Mar 2025 06:12)      PHYSICAL EXAMINATION  General: NAD   HEENT: +tracheostomy tube is midline, neck supple, no JVD  Cards: normal s1, s2, regular rate and rhythm  Pulm: +trach to ventilator. +Course vent sounds bilaterally. No wheezes. no respiratory distress  Abdomen: Soft, nondistended and nontender. +PEG present.   Extremities: no LE edema noted b/l  Neurology: AAO x 0, opens eyes at times, but does not follow commands       Mode: AC/ CMV (Assist Control/ Continuous Mandatory Ventilation)  RR (machine): 15  TV (machine): 400  FiO2: 50  PEEP: 5  ITime: 0.66  MAP: 10  PIP: 24      HOSPITAL MEDICATIONS:  MEDICATIONS  (STANDING):  albuterol/ipratropium for Nebulization 3 milliLiter(s) Nebulizer every 6 hours  amLODIPine   Tablet 10 milliGRAM(s) Oral daily  AQUAPHOR (petrolatum Ointment) 1 Application(s) Topical two times a day  atorvastatin 20 milliGRAM(s) Oral at bedtime  chlorhexidine 0.12% Liquid 15 milliLiter(s) Oral Mucosa every 12 hours  chlorhexidine 2% Cloths 1 Application(s) Topical daily  dextrose 5%. 1000 milliLiter(s) (100 mL/Hr) IV Continuous <Continuous>  dextrose 5%. 1000 milliLiter(s) (50 mL/Hr) IV Continuous <Continuous>  dextrose 50% Injectable 25 Gram(s) IV Push once  dextrose 50% Injectable 12.5 Gram(s) IV Push once  dextrose 50% Injectable 25 Gram(s) IV Push once  epoetin reilly-epbx (RETACRIT) Injectable 44454 Unit(s) IV Push <User Schedule>  ferrous    sulfate Liquid 300 milliGRAM(s) Enteral Tube daily  glucagon  Injectable 1 milliGRAM(s) IntraMuscular once  hydrALAZINE 25 milliGRAM(s) Oral three times a day  insulin lispro (ADMELOG) corrective regimen sliding scale   SubCutaneous every 6 hours  insulin NPH human recombinant 4 Unit(s) SubCutaneous every 6 hours  Nephro-noam 1 Tablet(s) Oral daily  pantoprazole   Suspension 40 milliGRAM(s) Oral before breakfast  sodium phosphate 15 milliMole(s)/250 mL IVPB 15 milliMole(s) IV Intermittent once    MEDICATIONS  (PRN):  dextrose Oral Gel 15 Gram(s) Oral once PRN Blood Glucose LESS THAN 70 milliGRAM(s)/deciliter  sodium chloride 0.9% Bolus. 100 milliLiter(s) IV Bolus every 5 minutes PRN SBP LESS THAN or EQUAL to 80 mmHg      LABS:                        8.0    15.52 )-----------( 241      ( 01 Mar 2025 06:05 )             25.5     03-01    135  |  94[L]  |  41[H]  ----------------------------<  205[H]  3.8   |  25  |  2.04[H]    Ca    9.2      01 Mar 2025 06:05  Phos  1.5     03-01  Mg     2.60     03-01      PT/INR - ( 28 Feb 2025 23:00 )   PT: 18.0 sec;   INR: 1.52 ratio         PTT - ( 01 Mar 2025 06:05 )  PTT:50.4 sec  Urinalysis Basic - ( 01 Mar 2025 06:05 )    Color: x / Appearance: x / SG: x / pH: x  Gluc: 205 mg/dL / Ketone: x  / Bili: x / Urobili: x   Blood: x / Protein: x / Nitrite: x   Leuk Esterase: x / RBC: x / WBC x   Sq Epi: x / Non Sq Epi: x / Bacteria: x

## 2025-03-01 NOTE — PROGRESS NOTE ADULT - ASSESSMENT
75 y/o M PMhx trach/vent (last changed 10/23/24), CVA x2 with residual R hemiplegia, COPD, ESRD on HD MWF who presented to ED for leukocytosis when initially planned for fistulogram and noted to have a WBC of 18.     VAP, leukocytosis  SARS-CoV-2/ RSV/ flu PCR negative  MRSA PCR negative  CXR- Right lower lobe infiltrate  blood cultures- NGTD  resp cx w/  pseudomonas  s/p cefepime x 7 days, completed 2/25  discussed w/ RICU team, bronch appeared clear  noted bronch cx w/ pseudomonas, suspect trach is colonized w/ pseudomonas    UTI- treated  UA w/ significant pyuria though noted epithelial cells indicating contamination  unable to assess urinary symptoms given patient's mentation  repeat urine cx also w/ E faecium  s/p vanc course, completed 2/25    DVT  RUE US- age-indeterminate, non-occlusive deep vein thrombosis noted in the right brachial vein  on heparin gtt    ESRD  HD per nephrology    leukocytosis  possibly reactive    Recommendations  wbc down  monitor off antibiotics  aspiration precautions  contact isolation for candida auris    [facility]  [phone]  After 5pm on weekdays and all day on weekends - please call [phone]  Available on [employer] teams    Thank you for consulting us and involving us in the management of this patients case. In addition to reviewing history, imaging, documents, labs, microbiology, took into account antibiotic stewardship, local antibiogram and infection control strategies and potential transmission issues.

## 2025-03-01 NOTE — PROGRESS NOTE ADULT - ASSESSMENT
74-year-old male hx trach/vent - , CVA, COPD, stage renal disease on dialysis 4 times a week (MTRF) history of pressure ulcer.  Patient presents the ED today for elevated white count. nephrology consulted for dialysis needs    ESRD:  from [facility]  On HD MTTsF via an A-V fistula. s/p fistulogram by vascular 2/18  Continue MWF in hospital  consent from wife in dialysis unit  s/p hd 2/26 uf 2.5L tolerated well. HD on 2/28.  monitor bmp    Hypertension:  Improved BP control.  continue norvasc 10 daily and hydralazine 25 tid  max uf as tolerated  monitor    Anemia:  Transfuse for Hg < 7.  s/p 1 unit PRBC 2/23  epo with hd  iron sat >20  monitor    leukocytosis  sepsis work up per team    hyponatremia  in setting of esrd and hyperglycemia  optimize dm control  hd per schedule with uF  monitor

## 2025-03-01 NOTE — PROGRESS NOTE ADULT - SUBJECTIVE AND OBJECTIVE BOX
[facility]  Infectious Disease  Dr Fox, Dr Wheatley, Dr Toribio, OLINDA Blanco Brian  [phone]  after hours and weekends [phone]    Name: JIMMY BLAND  Age: 74y  Gender: Male  MRN: [id number]    Interval History--  Notes reviewed  no events       Allergies    No Known Allergies    Intolerances        Medications--  Antibiotics:    Immunologic:  epoetin reilly-epbx (RETACRIT) Injectable 80906 Unit(s) IV Push <User Schedule>    Other:  albuterol/ipratropium for Nebulization  amLODIPine   Tablet  AQUAPHOR (petrolatum Ointment)  argatroban Infusion  atorvastatin  chlorhexidine 0.12% Liquid  chlorhexidine 2% Cloths  dextrose 5%.  dextrose 5%.  dextrose 50% Injectable  dextrose 50% Injectable  dextrose 50% Injectable  dextrose Oral Gel PRN  ferrous    sulfate Liquid  glucagon  Injectable  hydrALAZINE  insulin lispro (ADMELOG) corrective regimen sliding scale  insulin NPH human recombinant  Nephro-noam  pantoprazole   Suspension  sodium chloride 0.9% Bolus. PRN      Review of Systems--  A 10-point review of systems was obtained.     unable to obtain   Review of systems otherwise negative except as previously noted.    Physical Examination--  Vital Signs: T(F): 98.8 (03-01-25 @ 12:00), Max: 99.2 (03-01-25 @ 04:00)  HR: 80 (03-01-25 @ 16:32)  BP: 180/70 (03-01-25 @ 12:00)  RR: 15 (03-01-25 @ 12:00)  SpO2: 100% (03-01-25 @ 16:32)  Wt(kg): --  General: ill appearing   HEENT: AT/NC.  Neck: trach to peg   Nodes: None palpable.  Lungs: Clear bilaterally without rales, wheezing or rhonchi  Heart: Regular rate and rhythm. +sm  Abdomen: Bowel sounds present and normoactive. Soft. Nondistended.  Extremities: No cyanosis or clubbing. No edema.   Skin: Warm. Dry. Good turgor. No rash. No vasculitic stigmata.        Laboratory Studies--  CBC                        8.0    15.52 )-----------( 241      ( 01 Mar 2025 06:05 )             25.5       Chemistries  03-01    135  |  94[L]  |  41[H]  ----------------------------<  205[H]  3.8   |  25  |  2.04[H]    Ca    9.2      01 Mar 2025 06:05  Phos  1.5     03-01  Mg     2.60     03-01        Culture Data    Culture - Fungal, Bronchial (collected 26 Feb 2025 16:43)  Source: Bronchial Bronchial Lavage  Preliminary Report (28 Feb 2025 08:36):    No growth    Culture - Bronchial (collected 26 Feb 2025 16:43)  Source: Bronchial Bronchial Lavage  Gram Stain (26 Feb 2025 23:04):    Few polymorphonuclear leukocytes per low power field    No squamous epithelial cells    Few Gram Negative Rods per oil power field  Final Report (28 Feb 2025 17:24):    Few Pseudomonas aeruginosa    Commensal dominick consistent with body site  Organism: Pseudomonas aeruginosa (28 Feb 2025 17:24)  Organism: Pseudomonas aeruginosa (28 Feb 2025 17:24)

## 2025-03-01 NOTE — PROGRESS NOTE ADULT - ASSESSMENT
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 Patent recently admitted in 6/2024 for left pontine and cerebellar CVA requiring tracheostomy. While at [facility] patient decannulated and passed SS with advanced diet to easy to chew with thin liquids, but complicated COVID requiring transfer to [facility] in 7/2024 and then ultimately discharged home. Per wife patient was doing well at home until 10/2024 when he was found with RLL with concern for aspiration requiring intubation, then tracheostomy, and ultimately discharged to NH with vent dependence in 11/2024. Patient now presented for RUE fistulogram and angioplasty with vascular, however course complicated by leukocytosis with concern for recurrent trachitis vs PNA and UTI, AOCD and hyperglycemia. Admitted to RCU for further management. Found with  PSA trachitis/ PNA and enterococcus faecium UTI. Course complicated by RUE brachial DVT and hypoxia with HD with concern for volume down vs PE?      NEUROLOGY  # Poor mentation second to metabolic encephalopathy vs psychosomatic/ depression   - Hx of L cerebellar and pontene embolic CVA x 2 with residual R hemiplegia   - AOx0, bedbound, and nonverbal at baseline per report, however per exam patient able to open eyes, able to follow commands on left (LUE>LLE) with SEVERE weakness, however noted with R hemiplegia per baseline  - Nods yes and no occasionally however keeps eyes closed likely psychosomatic vs metabolic encephalopathy with infections?   - Last CTH in 10/2024 with no acute findings   - Hold [facility] CT for now   - Monitor mentation     CARDIOVASCULAR  # Hx of HTN and HLD  - Continue on hydral 25 and norvasc 10   - Monitor BP     # Incidental Pericardial effusion   - Incidental small pericardial effusion seen adjacent to right ventricle, however IVC appears flat and LE without edema with low concern for RV failure.   - Prior TTE reviewed from 4/2024 with normal RVLVSF with no pericardial effusion noted at the time.   - TTE 2/23 with EF 65, normal LVRVSF, mild LAD, normal RA, mild pulmonary hypertension, and small pericardial effusion noted adjacent to the anterior right ventricle with no echocardiographic evidence of tamponade  - Monitor hemodynamics     RESPIRATORY  # Respiratory failure second to PNA vs volume down vs PE?   - Hx of COPD with chronic respiratory failure with tracheostomy and vent dependence  - Admitted for angioplasty of RUE AVF, however course complicated by leukocytosis with concern for recurrent trachitis/ PNA.   - Course complicated by hypoxia during HD likely volume down vs migration of RUE brachial DVT with PE?   - Held volume removal, bolus given, and hypoxia improved.   - CTA CHEST without PE  - Continue on vent 15/400/50  - Hold PS for now  - Continue on nebs     HEENT   # Hemoptysis   - Wife reported hemoptysis while at nursing home 2 weeks prior to admission   - No hemoptysis noted on admission   - Hemoptysis returned in house   - CTA CHEST 2/24 without PE  - Bronch 2/26 with no evidence of bleeding   - Bleeding resolved   - Discussed with wife and will challenge with argatroban for below DVT  - Monitor for now    GI  # Dysphagia with PEG   - Passed SS with advanced diet to easy to chew with thin liquids in 7/2024, however since recurrent aspiration in 10/2024 now with PEG/ vent dependence   - Continue on PEG TF and changed to bolus feeds   - Monitor tolerance     RENAL  # ESRD on HD MTTHF   # AVF trouble  # Dehydration   - Presented for RUE fistulogram and angioplasty with vascular on 2/18   - Resumed on HD with no flow issue from AVF   - Course complicated by hypoxia with concern for volume down given small IVC and elevated lactate on 2/21  - Volume removal held, lactate improved, and hypoxia improved.   - Monitor volume status and continue on HD MTTF per renal   - Monitor renal function, electrolytes and UOP     # Hyponatremia likely volume down   - Concern for volume issues, however overall appears volume down with serum osmo 307  - Sodium 128 and improved with HD/ bolus during HD.   - Trend sodium with HD for now     # Elevated lactate   - Lactate elevated likely second to volume down?   - Post bolus lactate trending down from 3.3 to 2.7 to 1.3    INFECTIOUS DISEASE  # Hemoptysis   - Hemoptysis as above   - BAL with PSA as prior   - No fever and monitor off ABX     # Trachitis vs PNA/ UTI   - Hx of steno and PSA in sputum   - Flu/ COVID negative   - MRSA negative   - UCx with enterococcus   - SCx with  PSA   - Found with leukocytosis and started on cefepime and christiano (2/19) and vanco on HD days (2/21).   - No further steno seen in sputum and continue on cefepime (2/19 - 2/25) and vanco on HD days (2/21, 2/22 - 2/25)   - WBC increased likely reactive to hypoxic event and now improving  - ID following observe off abx     # PPX   - MRSA PCR negative  - Candida auris PCR POSITIVE  - Continue on isolation precautions per IC    HEME  # AOCD   - Presented for angioplasty and found with anemia to 6.7 s/p 1U PRBC 2/19 and 1/2 PRBC 2/23   - Anemia panel with concern for AOCD   - Continue on EPO QIW and Fe   - Monitor HH     # Heparin resistance?   - PTT persistently low despite increasing heparin GTT   - Resume on heparin GTT and anti Xa level supra therapeutic on but PTT remained low  - Patient ultimately with concern for heparin resistance    VASCULAR  # RUE DVT   - RUE edema noted with age-indeterminate, non-occlusive deep vein thrombosis noted in the right brachial vein.   - Case discussed with vascular who recommends full AC   - CTH from prior with encephalomalcia, however no hx of intracranial bleed   - Prior UGIB while on AC, however discharged on eliquis in 7/2024 and completed 6 month course for RLE DVT   - Continue on heparin GTT, however held for hemoptysis   - Hemoptysis now resolved and given resistance to heparin resumed on argatroban     ENDOCRINE  # DM2 A1C 14.0 (1/2024) > 6.4 (2/2025)  - Presented with hyperglycemia and continued on lantus and ISS   - Increase lantus to 28U QHS and ISS, however FS continues to trend in 200s   - Adjust insulin to NPH 16U Q6H (1200, 1800 and 0000) with tube feeds ([id number]) and FS improved, however TF held for bronchoscopy and FS dropped   - NPH held and now resumed at 4U Q6H with bolus feeds     ETHICS/ GOC    - FULL CODE   - Wife wishes for palliative discussion   - Pending palliative consult    DISPO - Back to NH when able

## 2025-03-01 NOTE — PROCEDURE NOTE - NSINFORMCONSENT_GEN_A_CORE
SW spoke with Belle at [facility]. Pt is current with [facility].  IF obs, then she does not need a new auth from [facility].  SW sent the referral to [facility].  They will review it and contact the SW back.    Marilee Dickson, [phone]  
Benefits, risks, and possible complications of procedure explained to patient/caregiver who verbalized understanding and gave written consent.
This was an emergent procedure.

## 2025-03-01 NOTE — PROCEDURE NOTE - NSPROCDETAILS_GEN_ALL_CORE
blood seen on insertion/dressing applied/flushes easily/secured in place
blood seen on insertion/dressing applied/flushes easily/secured in place

## 2025-03-01 NOTE — PROGRESS NOTE ADULT - SUBJECTIVE AND OBJECTIVE BOX
JIMMY BLAND  Patient is a 74y old  Male who presents with a chief complaint of leukocytosis (20 Feb 2025 13:41)    HPI:  Seen and examined.  ESRD on HD.    PAST MEDICAL & SURGICAL HISTORY:  ESRD on hemodialysis      HTN (hypertension)      Insulin dependent type 2 diabetes mellitus      History of cerebrovascular accident (CVA) with residual deficit      History of DVT of lower extremity      HLD (hyperlipidemia)      CVA (cerebrovascular accident)      Aphasia      Tracheostomy in place      PEG (percutaneous endoscopic gastrostomy) status      GERD (gastroesophageal reflux disease)      Constipation      Pressure ulcer      Arteriovenous fistula      S/P percutaneous endoscopic gastrostomy (PEG) tube placement      History of tracheostomy        MEDICATIONS  (STANDING):  albuterol/ipratropium for Nebulization 3 milliLiter(s) Nebulizer every 6 hours  amLODIPine   Tablet 10 milliGRAM(s) Oral daily  AQUAPHOR (petrolatum Ointment) 1 Application(s) Topical two times a day  argatroban Infusion 1.25 MICROgram(s)/kG/Min (4.04 mL/Hr) IV Continuous <Continuous>  atorvastatin 20 milliGRAM(s) Oral at bedtime  chlorhexidine 0.12% Liquid 15 milliLiter(s) Oral Mucosa every 12 hours  chlorhexidine 2% Cloths 1 Application(s) Topical daily  dextrose 5%. 1000 milliLiter(s) (100 mL/Hr) IV Continuous <Continuous>  dextrose 5%. 1000 milliLiter(s) (50 mL/Hr) IV Continuous <Continuous>  dextrose 50% Injectable 25 Gram(s) IV Push once  dextrose 50% Injectable 12.5 Gram(s) IV Push once  dextrose 50% Injectable 25 Gram(s) IV Push once  epoetin reilly-epbx (RETACRIT) Injectable 17826 Unit(s) IV Push <User Schedule>  ferrous    sulfate Liquid 300 milliGRAM(s) Enteral Tube daily  glucagon  Injectable 1 milliGRAM(s) IntraMuscular once  hydrALAZINE 25 milliGRAM(s) Oral three times a day  insulin lispro (ADMELOG) corrective regimen sliding scale   SubCutaneous every 6 hours  insulin NPH human recombinant 4 Unit(s) SubCutaneous every 6 hours  Nephro-noam 1 Tablet(s) Oral daily  pantoprazole   Suspension 40 milliGRAM(s) Oral before breakfast    Allergies    No Known Allergies    Intolerances      FAMILY HISTORY:  FHx: diabetes mellitus (Father, Aunt)        REVIEW OF SYSTEMS    Not reviewed.      Vital Signs Last 24 Hrs  T(C): 37.2 (01 Mar 2025 16:00), Max: 37.3 (01 Mar 2025 00:00)  T(F): 98.9 (01 Mar 2025 16:00), Max: 99.2 (01 Mar 2025 04:00)  HR: 89 (01 Mar 2025 18:40) (80 - 114)  BP: 156/60 (01 Mar 2025 16:00) (136/58 - 180/70)  BP(mean): 86 (01 Mar 2025 04:00) (80 - 86)  RR: 16 (01 Mar 2025 16:00) (14 - 18)  SpO2: 100% (01 Mar 2025 18:40) (95% - 100%)    Parameters below as of 01 Mar 2025 16:32  Patient On (Oxygen Delivery Method): ventilator        PHYSICAL EXAMINATION:  Constitutional: He  appears comfortable and not distressed. Not diaphoretic.    Neck:  The thyroid is normal. Trachea is midline.     Breasts: Normal examination.    Respiratory: The lungs are clear to auscultation. No dullness and expansion is normal.    Cardiovascular: S1 and S2 are normal. No mummurs, rubs or gallops are present.    Gastrointestinal: The abdomen is soft. No tenderness is present. No masses are present. Bowel sounds are normal.    Genitourinary: The bladder is not distended. No CVA tenderness is present.    Extremities: No edema is noted. No deformities are present.    Neurological: Tone, power and sensation are normal.     Skin: No lesions are seen  or palpated.    Lymph Nodes: No lymphadenopathy is present.                          8.0    15.52 )-----------( 241      ( 01 Mar 2025 06:05 )             25.5     03-01    135  |  94[L]  |  41[H]  ----------------------------<  205[H]  3.8   |  25  |  2.04[H]    Ca    9.2      01 Mar 2025 06:05  Phos  1.5     03-01  Mg     2.60     03-01

## 2025-03-02 LAB
ANION GAP SERPL CALC-SCNC: 13 MMOL/L — SIGNIFICANT CHANGE UP (ref 7–14)
ANION GAP SERPL CALC-SCNC: 15 MMOL/L — HIGH (ref 7–14)
APTT BLD: 51.9 SEC — HIGH (ref 24.5–35.6)
APTT BLD: 61.4 SEC — HIGH (ref 24.5–35.6)
APTT BLD: 65 SEC — HIGH (ref 24.5–35.6)
APTT BLD: 70.2 SEC — HIGH (ref 24.5–35.6)
BUN SERPL-MCNC: 57 MG/DL — HIGH (ref 7–23)
BUN SERPL-MCNC: 63 MG/DL — HIGH (ref 7–23)
CALCIUM SERPL-MCNC: 9.2 MG/DL — SIGNIFICANT CHANGE UP (ref 8.4–10.5)
CALCIUM SERPL-MCNC: 9.2 MG/DL — SIGNIFICANT CHANGE UP (ref 8.4–10.5)
CHLORIDE SERPL-SCNC: 91 MMOL/L — LOW (ref 98–107)
CHLORIDE SERPL-SCNC: 91 MMOL/L — LOW (ref 98–107)
CO2 SERPL-SCNC: 24 MMOL/L — SIGNIFICANT CHANGE UP (ref 22–31)
CO2 SERPL-SCNC: 27 MMOL/L — SIGNIFICANT CHANGE UP (ref 22–31)
CREAT SERPL-MCNC: 2.74 MG/DL — HIGH (ref 0.5–1.3)
CREAT SERPL-MCNC: 2.98 MG/DL — HIGH (ref 0.5–1.3)
EGFR: 22 ML/MIN/1.73M2 — LOW
EGFR: 22 ML/MIN/1.73M2 — LOW
EGFR: 24 ML/MIN/1.73M2 — LOW
EGFR: 24 ML/MIN/1.73M2 — LOW
GLUCOSE BLDC GLUCOMTR-MCNC: 224 MG/DL — HIGH (ref 70–99)
GLUCOSE BLDC GLUCOMTR-MCNC: 251 MG/DL — HIGH (ref 70–99)
GLUCOSE BLDC GLUCOMTR-MCNC: 258 MG/DL — HIGH (ref 70–99)
GLUCOSE BLDC GLUCOMTR-MCNC: 329 MG/DL — HIGH (ref 70–99)
GLUCOSE SERPL-MCNC: 197 MG/DL — HIGH (ref 70–99)
GLUCOSE SERPL-MCNC: 246 MG/DL — HIGH (ref 70–99)
HCT VFR BLD CALC: 24.4 % — LOW (ref 39–50)
HCT VFR BLD CALC: 25.2 % — LOW (ref 39–50)
HGB BLD-MCNC: 7.8 G/DL — LOW (ref 13–17)
INR BLD: 2.12 RATIO — HIGH (ref 0.85–1.16)
INR BLD: 2.16 RATIO — HIGH (ref 0.85–1.16)
INR BLD: 2.19 RATIO — HIGH (ref 0.85–1.16)
MAGNESIUM SERPL-MCNC: 2.7 MG/DL — HIGH (ref 1.6–2.6)
MAGNESIUM SERPL-MCNC: 2.8 MG/DL — HIGH (ref 1.6–2.6)
MCHC RBC-ENTMCNC: 23.5 PG — LOW (ref 27–34)
MCHC RBC-ENTMCNC: 24 PG — LOW (ref 27–34)
MCHC RBC-ENTMCNC: 30.6 G/DL — LOW (ref 32–36)
MCHC RBC-ENTMCNC: 32 G/DL — SIGNIFICANT CHANGE UP (ref 32–36)
MCV RBC AUTO: 75.1 FL — LOW (ref 80–100)
MCV RBC AUTO: 77.1 FL — LOW (ref 80–100)
NRBC # BLD AUTO: 0 K/UL — SIGNIFICANT CHANGE UP (ref 0–0)
NRBC # BLD AUTO: 0.02 K/UL — HIGH (ref 0–0)
NRBC # FLD: 0 K/UL — SIGNIFICANT CHANGE UP (ref 0–0)
NRBC # FLD: 0.02 K/UL — HIGH (ref 0–0)
NRBC BLD AUTO-RTO: 0 /100 WBCS — SIGNIFICANT CHANGE UP (ref 0–0)
PHOSPHATE SERPL-MCNC: 2 MG/DL — LOW (ref 2.5–4.5)
PHOSPHATE SERPL-MCNC: 2.2 MG/DL — LOW (ref 2.5–4.5)
PLATELET # BLD AUTO: 245 K/UL — SIGNIFICANT CHANGE UP (ref 150–400)
PLATELET # BLD AUTO: 262 K/UL — SIGNIFICANT CHANGE UP (ref 150–400)
POTASSIUM SERPL-MCNC: 3.7 MMOL/L — SIGNIFICANT CHANGE UP (ref 3.5–5.3)
POTASSIUM SERPL-MCNC: 3.9 MMOL/L — SIGNIFICANT CHANGE UP (ref 3.5–5.3)
POTASSIUM SERPL-SCNC: 3.7 MMOL/L — SIGNIFICANT CHANGE UP (ref 3.5–5.3)
POTASSIUM SERPL-SCNC: 3.9 MMOL/L — SIGNIFICANT CHANGE UP (ref 3.5–5.3)
PROTHROM AB SERPL-ACNC: 25 SEC — HIGH (ref 9.9–13.4)
PROTHROM AB SERPL-ACNC: 25.5 SEC — HIGH (ref 9.9–13.4)
PROTHROM AB SERPL-ACNC: 25.9 SEC — HIGH (ref 9.9–13.4)
RBC # BLD: 3.25 M/UL — LOW (ref 4.2–5.8)
RBC # BLD: 3.27 M/UL — LOW (ref 4.2–5.8)
RBC # FLD: 17.8 % — HIGH (ref 10.3–14.5)
RBC # FLD: 18.1 % — HIGH (ref 10.3–14.5)
SODIUM SERPL-SCNC: 130 MMOL/L — LOW (ref 135–145)
SODIUM SERPL-SCNC: 131 MMOL/L — LOW (ref 135–145)
WBC # BLD: 13.96 K/UL — HIGH (ref 3.8–10.5)
WBC # BLD: 15.75 K/UL — HIGH (ref 3.8–10.5)
WBC # FLD AUTO: 13.96 K/UL — HIGH (ref 3.8–10.5)
WBC # FLD AUTO: 15.75 K/UL — HIGH (ref 3.8–10.5)

## 2025-03-02 PROCEDURE — 99233 SBSQ HOSP IP/OBS HIGH 50: CPT | Mod: FS

## 2025-03-02 RX ORDER — ARGATROBAN 100 MG/ML
1.75 INJECTION, SOLUTION INTRAVENOUS
Qty: 50 | Refills: 0 | Status: DISCONTINUED | OUTPATIENT
Start: 2025-03-02 | End: 2025-03-03

## 2025-03-02 RX ADMIN — Medication 15 MILLILITER(S): at 06:00

## 2025-03-02 RX ADMIN — INSULIN LISPRO 2: 100 INJECTION, SOLUTION INTRAVENOUS; SUBCUTANEOUS at 12:12

## 2025-03-02 RX ADMIN — IPRATROPIUM BROMIDE AND ALBUTEROL SULFATE 3 MILLILITER(S): .5; 2.5 SOLUTION RESPIRATORY (INHALATION) at 11:56

## 2025-03-02 RX ADMIN — ARGATROBAN 5.65 MICROGRAM(S)/KG/MIN: 100 INJECTION, SOLUTION INTRAVENOUS at 22:30

## 2025-03-02 RX ADMIN — Medication 25 MILLIGRAM(S): at 05:59

## 2025-03-02 RX ADMIN — Medication 1 APPLICATION(S): at 12:14

## 2025-03-02 RX ADMIN — Medication 4 UNIT(S): at 12:12

## 2025-03-02 RX ADMIN — Medication 4 UNIT(S): at 06:00

## 2025-03-02 RX ADMIN — INSULIN LISPRO 3: 100 INJECTION, SOLUTION INTRAVENOUS; SUBCUTANEOUS at 18:17

## 2025-03-02 RX ADMIN — Medication 2.5 MILLIGRAM(S): at 18:20

## 2025-03-02 RX ADMIN — IPRATROPIUM BROMIDE AND ALBUTEROL SULFATE 3 MILLILITER(S): .5; 2.5 SOLUTION RESPIRATORY (INHALATION) at 03:08

## 2025-03-02 RX ADMIN — ATORVASTATIN CALCIUM 20 MILLIGRAM(S): 80 TABLET, FILM COATED ORAL at 21:50

## 2025-03-02 RX ADMIN — Medication 50 MILLIGRAM(S): at 21:50

## 2025-03-02 RX ADMIN — IPRATROPIUM BROMIDE AND ALBUTEROL SULFATE 3 MILLILITER(S): .5; 2.5 SOLUTION RESPIRATORY (INHALATION) at 21:48

## 2025-03-02 RX ADMIN — Medication 25 MILLIGRAM(S): at 12:13

## 2025-03-02 RX ADMIN — WHITE PETROLATUM 1 APPLICATION(S): 1 OINTMENT TOPICAL at 18:16

## 2025-03-02 RX ADMIN — ARGATROBAN 5.65 MICROGRAM(S)/KG/MIN: 100 INJECTION, SOLUTION INTRAVENOUS at 12:11

## 2025-03-02 RX ADMIN — AMLODIPINE BESYLATE 10 MILLIGRAM(S): 10 TABLET ORAL at 06:01

## 2025-03-02 RX ADMIN — Medication 40 MILLIGRAM(S): at 06:31

## 2025-03-02 RX ADMIN — Medication 1 TABLET(S): at 12:13

## 2025-03-02 RX ADMIN — Medication 15 MILLILITER(S): at 18:16

## 2025-03-02 RX ADMIN — IPRATROPIUM BROMIDE AND ALBUTEROL SULFATE 3 MILLILITER(S): .5; 2.5 SOLUTION RESPIRATORY (INHALATION) at 16:14

## 2025-03-02 RX ADMIN — Medication 300 MILLIGRAM(S): at 12:13

## 2025-03-02 RX ADMIN — INSULIN LISPRO 3: 100 INJECTION, SOLUTION INTRAVENOUS; SUBCUTANEOUS at 06:00

## 2025-03-02 RX ADMIN — WHITE PETROLATUM 1 APPLICATION(S): 1 OINTMENT TOPICAL at 06:01

## 2025-03-02 RX ADMIN — Medication 4 UNIT(S): at 18:17

## 2025-03-02 RX ADMIN — ARGATROBAN 5.65 MICROGRAM(S)/KG/MIN: 100 INJECTION, SOLUTION INTRAVENOUS at 11:05

## 2025-03-02 NOTE — CHART NOTE - NSCHARTNOTEFT_GEN_A_CORE
ABG obtained from L brachial artery. Patient tolerated procedure well without complications. Hemostasis achieved with direct pressure and pressure dressing left in place c/d/i.    Adams Rowley PA-C,   Internal Medicine ACP   Respiratory Care Unit   Spectra [id number]

## 2025-03-02 NOTE — PROGRESS NOTE ADULT - ASSESSMENT
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 Patent recently admitted in 6/2024 for left pontine and cerebellar CVA requiring tracheostomy. While at [facility] patient decannulated and passed SS with advanced diet to easy to chew with thin liquids, but complicated COVID requiring transfer to [facility] in 7/2024 and then ultimately discharged home. Per wife patient was doing well at home until 10/2024 when he was found with RLL with concern for aspiration requiring intubation, then tracheostomy, and ultimately discharged to NH with vent dependence in 11/2024. Patient now presented for RUE fistulogram and angioplasty with vascular, however course complicated by leukocytosis with concern for recurrent trachitis vs PNA and UTI, AOCD and hyperglycemia. Admitted to RCU for further management. Found with  PSA trachitis/ PNA and enterococcus faecium UTI. Course complicated by RUE brachial DVT and hypoxia with HD with concern for volume down vs PE?      NEUROLOGY  # Poor mentation second to metabolic encephalopathy vs psychosomatic/ depression   - Hx of L cerebellar and pontene embolic CVA x 2 with residual R hemiplegia   - AOx0, bedbound, and nonverbal at baseline per report, however per exam patient able to open eyes, able to follow commands on left (LUE>LLE) with SEVERE weakness, however noted with R hemiplegia per baseline  - Nods yes and no occasionally however keeps eyes closed likely psychosomatic vs metabolic encephalopathy with infections?   - Last CTH in 10/2024 with no acute findings   - Hold [facility] CT for now   - Monitor mentation     CARDIOVASCULAR  # Hx of HTN and HLD  - Continue on hydral 25 and norvasc 10   - Monitor BP     # Incidental Pericardial effusion   - Incidental small pericardial effusion seen adjacent to right ventricle, however IVC appears flat and LE without edema with low concern for RV failure.   - Prior TTE reviewed from 4/2024 with normal RVLVSF with no pericardial effusion noted at the time.   - TTE 2/23 with EF 65, normal LVRVSF, mild LAD, normal RA, mild pulmonary hypertension, and small pericardial effusion noted adjacent to the anterior right ventricle with no echocardiographic evidence of tamponade  - Monitor hemodynamics     RESPIRATORY  # Respiratory failure second to PNA vs volume down vs PE?   - Hx of COPD with chronic respiratory failure with tracheostomy and vent dependence  - Admitted for angioplasty of RUE AVF, however course complicated by leukocytosis with concern for recurrent trachitis/ PNA.   - Course complicated by hypoxia during HD likely volume down vs migration of RUE brachial DVT with PE?   - Held volume removal, bolus given, and hypoxia improved.   - CTA CHEST without PE  - Continue on vent 15/400/50  - Hold PS for now  - Continue on nebs     HEENT   # Hemoptysis   - Wife reported hemoptysis while at nursing home 2 weeks prior to admission   - No hemoptysis noted on admission   - Hemoptysis returned in house   - CTA CHEST 2/24 without PE  - Bronch 2/26 with no evidence of bleeding   - Bleeding resolved   - Discussed with wife and will challenge with argatroban for below DVT  - tolerating Argatroban infusion, no signs/ symptoms of bleeding   - Monitor for now    GI  # Dysphagia with PEG   - Passed SS with advanced diet to easy to chew with thin liquids in 7/2024, however since recurrent aspiration in 10/2024 now with PEG/ vent dependence   - Continue on PEG TF and changed to bolus feeds   - Monitor tolerance     RENAL  # ESRD on HD MTTHF   # AVF trouble  # Dehydration   - Presented for RUE fistulogram and angioplasty with vascular on 2/18   - Resumed on HD with no flow issue from AVF   - Course complicated by hypoxia with concern for volume down given small IVC and elevated lactate on 2/21  - Volume removal held, lactate improved, and hypoxia improved.   - Monitor volume status and continue on HD MTTF per renal   - Monitor renal function, electrolytes and UOP     # Hyponatremia likely volume down   - Concern for volume issues, however overall appears volume down with serum osmo 307  - Sodium 128 and improved with HD/ bolus during HD.   - Trend sodium with HD for now     # Elevated lactate   - Lactate elevated likely second to volume down?   - Post bolus lactate trending down from 3.3 to 2.7 to 1.3    INFECTIOUS DISEASE  # Hemoptysis   - Hemoptysis as above   - BAL with PSA as prior   - No fever and monitor off ABX     # Trachitis vs PNA/ UTI   - Hx of steno and PSA in sputum   - Flu/ COVID negative   - MRSA negative   - UCx with enterococcus   - SCx with  PSA   - Found with leukocytosis and started on cefepime and christiano (2/19) and vanco on HD days (2/21).   - No further steno seen in sputum and continue on cefepime (2/19 - 2/25) and vanco on HD days (2/21, 2/22 - 2/25)   - WBC increased likely reactive to hypoxic event and now improving  - ID following observe off abx     # PPX   - MRSA PCR negative  - Candida auris PCR POSITIVE  - Continue on isolation precautions per IC    HEME  # AOCD   - Presented for angioplasty and found with anemia to 6.7 s/p 1U PRBC 2/19 and 1/2 PRBC 2/23   - Anemia panel with concern for AOCD   - Continue on EPO QIW and Fe   - Monitor HH     # Heparin resistance?   - PTT persistently low despite increasing heparin GTT   - Resume on heparin GTT and anti Xa level supra therapeutic on but PTT remained low  - Patient ultimately with concern for heparin resistance    VASCULAR  # RUE DVT   - RUE edema noted with age-indeterminate, non-occlusive deep vein thrombosis noted in the right brachial vein.   - Case discussed with vascular who recommends full AC   - CTH from prior with encephalomalcia, however no hx of intracranial bleed   - Prior UGIB while on AC, however discharged on eliquis in 7/2024 and completed 6 month course for RLE DVT   - Continue on heparin GTT, however held for hemoptysis   - Hemoptysis now resolved and given resistance to heparin resumed on argatroban   - tolerating argatroban infusion, no signs/ symptoms of bleeding     ENDOCRINE  # DM2 A1C 14.0 (1/2024) > 6.4 (2/2025)  - Presented with hyperglycemia and continued on lantus and ISS   - Increase lantus to 28U QHS and ISS, however FS continues to trend in 200s   - Adjust insulin to NPH 16U Q6H (1200, 1800 and 0000) with tube feeds (1561-3309) and FS improved, however TF held for bronchoscopy and FS dropped   - NPH held and now resumed at 4U Q6H with bolus feeds     ETHICS/ GOC    - FULL CODE   - Wife wishes for palliative discussion   - Pending palliative consult    DISPO - Back to NH when able

## 2025-03-02 NOTE — PROGRESS NOTE ADULT - NS ATTEND AMEND GEN_ALL_CORE FT
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 c/b acute hypoxemic and hypercapnic respiratory failure s/p ETT intubation/MV with failure to wean from ventilator s/p tracheostomy with hospital course further complicated by aspiration and B cereus bacteremia and RLE DVT followed by citrobacter PNA, GIB i/s/o AOCD, and fistula stenosis s/p fistulogram 6/4 ultimately transferred to acute rehab, decannulated, and discharged home until 10/2024 when pt returned to OSH with aspiration PNA c/b hypoxemic respiratory failure requiring ETT intubation/MV followed by tracheostomy placement and d/c to LTCF ([facility]) 11/2024 now presenting to [facility] on 2/18/25 for RUE fistulogram/angioplasty with vascular sx but with hospital course c/b concern fo recurrent PNA with uncontrolled hyperglycemia admitted to RCU for further medical management.      Pt p/w acute encephalopathy i/s/o previously known left talya and left cerebellum ischemic infarct (no vessel occlusion/stenosis) with residual right hemiplegia. Pt remains obtunded, but intermittently answers appropriately to questions/basic commands. W/u for secondary encephalopathy otherwise NTD. Continue on NOAC and statin. PT/OT following.     Pt with acute combined hypoxemic and hypercapnic respiratory failure s/p ETT intubation/MV with failure to wean from ventilator requiring repeat tracheostomy placement (1st time IP 5/14/24~7/2024 with successful decannulation, 2nd time OSH ~10/2024). Pt now vent dependent, will attempt weaning trials as tolerated. Trach care and suctioning as per RCU team. Oral hygiene and aspiration precautions in place. Wean O2 supplementation for goal O2 saturation 90-95%. Airway clearance therapy in place. CTA performed 2/24 2/2 acute hypoxemic i/s/o known VTEs (-) PE but with significant mucous burden with associated atelectasis. IPV added to regimen. Overnight pt with 2 episodes of hemoptysis with blood clots suctioned i/s/o supratherapeutic heparin drip. Heparin held, bedside bronchoscopy performed. No evidence of active bleeding or DAH but pt with profuse thick mucous in multiple lobes, especially on left side. BAL sent for cultures. Ventilator compliance improving, will c/w current treatment. No further episodes of hemoptysis.    Pt with oropharyngeal dysphagia s/p PEG placement now tolerating feeds at goal rate. Pt with previous hospitalization with concern for melena on 5/26, now stable with no further events. Will CTM carefully while on full A/C. PPI QD. CBC q24hr. Bowel regimen prn. Pt further developed new pressure ulcers found on hospital arrival, likely while at LTCD. Wound consult placed, recs appreciated.    Pt with ESRD via right AVF found to have stenosis requiring temporary access on previous admission s/p RUE fistuloplasty (6/4) now returning for RUE fistulogram and angioplasty (2/18) but found to have severe hyperglycemia and leukocytosis requiring admission. Right brachial vein noted to have acute DVT. A/C initiated with heparin drip, but with persistently subtherapeutic pTTs found to have high anti-Xa levels concerning for heparin resistance with difficulty keeping therapeutic. Pt switched to argatroban drip with plan to transition to NOAC if tolerates.     Dispo pending medical optimization. Pt full code. DVT ppx in place. Pt found on this admission to have (+) candida auris colonization (as per wife, pt's neighbor at LTCF was recently found to have it and pt was moved out of room at that time.) Will continue to update family and discuss plan of care throughout hospitalization, discussed at length with pt's wife and daughter at bedside daily.

## 2025-03-02 NOTE — PROGRESS NOTE ADULT - SUBJECTIVE AND OBJECTIVE BOX
CHIEF COMPLAINT: Patient is a 74y old  Male who presents with a chief complaint of leukocytosis (20 Feb 2025 13:41)      INTERVAL EVENTS:   - no acute overnight events, VSS, afebrile on the ventilator  - continued on Argatroban infusion for RUE DVT, now therapeutic     ROS: Seen by bedside during AM rounds     OBJECTIVE:  ICU Vital Signs Last 24 Hrs  T(C): 37.3 (02 Mar 2025 00:00), Max: 37.3 (02 Mar 2025 00:00)  T(F): 99.1 (02 Mar 2025 00:00), Max: 99.1 (02 Mar 2025 00:00)  HR: 85 (02 Mar 2025 03:10) (80 - 105)  BP: 168/52 (02 Mar 2025 00:00) (156/60 - 180/70)  BP(mean): --  ABP: --  ABP(mean): --  RR: 17 (02 Mar 2025 00:00) (15 - 17)  SpO2: 99% (02 Mar 2025 03:10) (99% - 100%)    O2 Parameters below as of 02 Mar 2025 03:10  Patient On (Oxygen Delivery Method): ventilator          Mode: AC/ CMV (Assist Control/ Continuous Mandatory Ventilation), RR (machine): 15, TV (machine): 400, FiO2: 50, PEEP: 5, ITime: 0.68, MAP: 10, PIP: 30    03-01 @ 07:01  -  03-02 @ 07:00  --------------------------------------------------------  IN: 1160 mL / OUT: 0 mL / NET: 1160 mL      CAPILLARY BLOOD GLUCOSE      POCT Blood Glucose.: 251 mg/dL (02 Mar 2025 05:57)      PHYSICAL EXAM:  General:   HEENT:   Lymph Nodes:  Neck:   Respiratory:   Cardiovascular:   Abdomen:   Extremities:   Skin:   Neurological:  Psychiatry:    Mode: AC/ CMV (Assist Control/ Continuous Mandatory Ventilation)  RR (machine): 15  TV (machine): 400  FiO2: 50  PEEP: 5  ITime: 0.68  MAP: 10  PIP: 30      HOSPITAL MEDICATIONS:  MEDICATIONS  (STANDING):  albuterol/ipratropium for Nebulization 3 milliLiter(s) Nebulizer every 6 hours  amLODIPine   Tablet 10 milliGRAM(s) Oral daily  AQUAPHOR (petrolatum Ointment) 1 Application(s) Topical two times a day  argatroban Infusion 1.502 MICROgram(s)/kG/Min (4.85 mL/Hr) IV Continuous <Continuous>  atorvastatin 20 milliGRAM(s) Oral at bedtime  chlorhexidine 0.12% Liquid 15 milliLiter(s) Oral Mucosa every 12 hours  chlorhexidine 2% Cloths 1 Application(s) Topical daily  dextrose 5%. 1000 milliLiter(s) (100 mL/Hr) IV Continuous <Continuous>  dextrose 5%. 1000 milliLiter(s) (50 mL/Hr) IV Continuous <Continuous>  dextrose 50% Injectable 25 Gram(s) IV Push once  dextrose 50% Injectable 12.5 Gram(s) IV Push once  dextrose 50% Injectable 25 Gram(s) IV Push once  epoetin reilly-epbx (RETACRIT) Injectable 03662 Unit(s) IV Push <User Schedule>  ferrous    sulfate Liquid 300 milliGRAM(s) Enteral Tube daily  glucagon  Injectable 1 milliGRAM(s) IntraMuscular once  hydrALAZINE 25 milliGRAM(s) Oral three times a day  insulin lispro (ADMELOG) corrective regimen sliding scale   SubCutaneous every 6 hours  insulin NPH human recombinant 4 Unit(s) SubCutaneous every 6 hours  Nephro-noam 1 Tablet(s) Oral daily  pantoprazole   Suspension 40 milliGRAM(s) Oral before breakfast    MEDICATIONS  (PRN):  dextrose Oral Gel 15 Gram(s) Oral once PRN Blood Glucose LESS THAN 70 milliGRAM(s)/deciliter  sodium chloride 0.9% Bolus. 100 milliLiter(s) IV Bolus every 5 minutes PRN SBP LESS THAN or EQUAL to 80 mmHg      LABS:                        7.7    15.75 )-----------( 262      ( 02 Mar 2025 01:38 )             25.2     03-02    130[L]  |  91[L]  |  57[H]  ----------------------------<  246[H]  3.7   |  24  |  2.74[H]    Ca    9.2      02 Mar 2025 01:38  Phos  2.0     03-02  Mg     2.70     03-02      PT/INR - ( 02 Mar 2025 01:38 )   PT: 25.5 sec;   INR: 2.16 ratio         PTT - ( 02 Mar 2025 01:38 )  PTT:65.0 sec  Urinalysis Basic - ( 02 Mar 2025 01:38 )    Color: x / Appearance: x / SG: x / pH: x  Gluc: 246 mg/dL / Ketone: x  / Bili: x / Urobili: x   Blood: x / Protein: x / Nitrite: x   Leuk Esterase: x / RBC: x / WBC x   Sq Epi: x / Non Sq Epi: x / Bacteria: x         CHIEF COMPLAINT: Patient is a 74y old  Male who presents with a chief complaint of leukocytosis (20 Feb 2025 13:41)      INTERVAL EVENTS:   - no acute overnight events, VSS, afebrile on the ventilator  - continued on Argatroban infusion for RUE DVT, now therapeutic     ROS: Seen by bedside during AM rounds, unable to obtain due to ventilator     OBJECTIVE:  ICU Vital Signs Last 24 Hrs  T(C): 37.3 (02 Mar 2025 00:00), Max: 37.3 (02 Mar 2025 00:00)  T(F): 99.1 (02 Mar 2025 00:00), Max: 99.1 (02 Mar 2025 00:00)  HR: 85 (02 Mar 2025 03:10) (80 - 105)  BP: 168/52 (02 Mar 2025 00:00) (156/60 - 180/70)  BP(mean): --  ABP: --  ABP(mean): --  RR: 17 (02 Mar 2025 00:00) (15 - 17)  SpO2: 99% (02 Mar 2025 03:10) (99% - 100%)    O2 Parameters below as of 02 Mar 2025 03:10  Patient On (Oxygen Delivery Method): ventilator          Mode: AC/ CMV (Assist Control/ Continuous Mandatory Ventilation), RR (machine): 15, TV (machine): 400, FiO2: 50, PEEP: 5, ITime: 0.68, MAP: 10, PIP: 30    03-01 @ 07:01  -  03-02 @ 07:00  --------------------------------------------------------  IN: 1160 mL / OUT: 0 mL / NET: 1160 mL      CAPILLARY BLOOD GLUCOSE      POCT Blood Glucose.: 251 mg/dL (02 Mar 2025 05:57)      PHYSICAL EXAMINATION  General: NAD   HEENT: +tracheostomy tube is midline, neck supple, no JVD   Cards: normal s1, s2, regular rate and rhythm  Pulm: +trach to ventilator. +Course vent sounds bilaterally. No wheezes. no respiratory distress  Abdomen: Soft, nondistended and nontender. +PEG present.   Extremities: no LE edema noted b/l  Neurology: AAO x 0, opens eyes at times, but does not follow commands       Mode: AC/ CMV (Assist Control/ Continuous Mandatory Ventilation)  RR (machine): 15  TV (machine): 400  FiO2: 50  PEEP: 5  ITime: 0.68  MAP: 10  PIP: 30      HOSPITAL MEDICATIONS:  MEDICATIONS  (STANDING):  albuterol/ipratropium for Nebulization 3 milliLiter(s) Nebulizer every 6 hours  amLODIPine   Tablet 10 milliGRAM(s) Oral daily  AQUAPHOR (petrolatum Ointment) 1 Application(s) Topical two times a day  argatroban Infusion 1.502 MICROgram(s)/kG/Min (4.85 mL/Hr) IV Continuous <Continuous>  atorvastatin 20 milliGRAM(s) Oral at bedtime  chlorhexidine 0.12% Liquid 15 milliLiter(s) Oral Mucosa every 12 hours  chlorhexidine 2% Cloths 1 Application(s) Topical daily  dextrose 5%. 1000 milliLiter(s) (100 mL/Hr) IV Continuous <Continuous>  dextrose 5%. 1000 milliLiter(s) (50 mL/Hr) IV Continuous <Continuous>  dextrose 50% Injectable 25 Gram(s) IV Push once  dextrose 50% Injectable 12.5 Gram(s) IV Push once  dextrose 50% Injectable 25 Gram(s) IV Push once  epoetin reilly-epbx (RETACRIT) Injectable 36973 Unit(s) IV Push <User Schedule>  ferrous    sulfate Liquid 300 milliGRAM(s) Enteral Tube daily  glucagon  Injectable 1 milliGRAM(s) IntraMuscular once  hydrALAZINE 25 milliGRAM(s) Oral three times a day  insulin lispro (ADMELOG) corrective regimen sliding scale   SubCutaneous every 6 hours  insulin NPH human recombinant 4 Unit(s) SubCutaneous every 6 hours  Nephro-noam 1 Tablet(s) Oral daily  pantoprazole   Suspension 40 milliGRAM(s) Oral before breakfast    MEDICATIONS  (PRN):  dextrose Oral Gel 15 Gram(s) Oral once PRN Blood Glucose LESS THAN 70 milliGRAM(s)/deciliter  sodium chloride 0.9% Bolus. 100 milliLiter(s) IV Bolus every 5 minutes PRN SBP LESS THAN or EQUAL to 80 mmHg      LABS:                        7.7    15.75 )-----------( 262      ( 02 Mar 2025 01:38 )             25.2     03-02    130[L]  |  91[L]  |  57[H]  ----------------------------<  246[H]  3.7   |  24  |  2.74[H]    Ca    9.2      02 Mar 2025 01:38  Phos  2.0     03-02  Mg     2.70     03-02      PT/INR - ( 02 Mar 2025 01:38 )   PT: 25.5 sec;   INR: 2.16 ratio         PTT - ( 02 Mar 2025 01:38 )  PTT:65.0 sec  Urinalysis Basic - ( 02 Mar 2025 01:38 )    Color: x / Appearance: x / SG: x / pH: x  Gluc: 246 mg/dL / Ketone: x  / Bili: x / Urobili: x   Blood: x / Protein: x / Nitrite: x   Leuk Esterase: x / RBC: x / WBC x   Sq Epi: x / Non Sq Epi: x / Bacteria: x

## 2025-03-02 NOTE — CHART NOTE - NSCHARTNOTEFT_GEN_A_CORE
PTT obtained at 1:38am is 65 (therapeutic) . Will repeat ptt in 6 hours.    Ashlie Sharpe PA-C  Department of Medicine [id number]

## 2025-03-03 DIAGNOSIS — I63.9 CEREBRAL INFARCTION, UNSPECIFIED: ICD-10-CM

## 2025-03-03 DIAGNOSIS — Z51.5 ENCOUNTER FOR PALLIATIVE CARE: ICD-10-CM

## 2025-03-03 DIAGNOSIS — R53.2 FUNCTIONAL QUADRIPLEGIA: ICD-10-CM

## 2025-03-03 DIAGNOSIS — J96.11 CHRONIC RESPIRATORY FAILURE WITH HYPOXIA: ICD-10-CM

## 2025-03-03 LAB
ADD ON TEST-SPECIMEN IN LAB: SIGNIFICANT CHANGE UP
ANION GAP SERPL CALC-SCNC: 14 MMOL/L — SIGNIFICANT CHANGE UP (ref 7–14)
ANION GAP SERPL CALC-SCNC: 15 MMOL/L — HIGH (ref 7–14)
B PERT DNA SPEC QL NAA+PROBE: SIGNIFICANT CHANGE UP
B PERT+PARAPERT DNA PNL SPEC NAA+PROBE: SIGNIFICANT CHANGE UP
BUN SERPL-MCNC: 77 MG/DL — HIGH (ref 7–23)
C PNEUM DNA SPEC QL NAA+PROBE: SIGNIFICANT CHANGE UP
CALCIUM SERPL-MCNC: 9.1 MG/DL — SIGNIFICANT CHANGE UP (ref 8.4–10.5)
CALCIUM SERPL-MCNC: 9.4 MG/DL — SIGNIFICANT CHANGE UP (ref 8.4–10.5)
CHLORIDE SERPL-SCNC: 88 MMOL/L — LOW (ref 98–107)
CHLORIDE SERPL-SCNC: 96 MMOL/L — LOW (ref 98–107)
CO2 SERPL-SCNC: 25 MMOL/L — SIGNIFICANT CHANGE UP (ref 22–31)
CO2 SERPL-SCNC: 26 MMOL/L — SIGNIFICANT CHANGE UP (ref 22–31)
CREAT SERPL-MCNC: 2.24 MG/DL — HIGH (ref 0.5–1.3)
CREAT SERPL-MCNC: 3.49 MG/DL — HIGH (ref 0.5–1.3)
EGFR: 18 ML/MIN/1.73M2 — LOW
EGFR: 18 ML/MIN/1.73M2 — LOW
EGFR: 30 ML/MIN/1.73M2 — LOW
EGFR: 30 ML/MIN/1.73M2 — LOW
FLUAV AG NPH QL: SIGNIFICANT CHANGE UP
FLUAV SUBTYP SPEC NAA+PROBE: SIGNIFICANT CHANGE UP
FLUBV AG NPH QL: SIGNIFICANT CHANGE UP
FLUBV RNA SPEC QL NAA+PROBE: SIGNIFICANT CHANGE UP
GLUCOSE BLDC GLUCOMTR-MCNC: 158 MG/DL — HIGH (ref 70–99)
GLUCOSE BLDC GLUCOMTR-MCNC: 195 MG/DL — HIGH (ref 70–99)
GLUCOSE BLDC GLUCOMTR-MCNC: 278 MG/DL — HIGH (ref 70–99)
GLUCOSE SERPL-MCNC: 111 MG/DL — HIGH (ref 70–99)
GLUCOSE SERPL-MCNC: 232 MG/DL — HIGH (ref 70–99)
HADV DNA SPEC QL NAA+PROBE: SIGNIFICANT CHANGE UP
HCOV 229E RNA SPEC QL NAA+PROBE: SIGNIFICANT CHANGE UP
HCOV HKU1 RNA SPEC QL NAA+PROBE: SIGNIFICANT CHANGE UP
HCOV NL63 RNA SPEC QL NAA+PROBE: SIGNIFICANT CHANGE UP
HCOV OC43 RNA SPEC QL NAA+PROBE: SIGNIFICANT CHANGE UP
HCT VFR BLD CALC: 23.7 % — LOW (ref 39–50)
HGB BLD-MCNC: 7.7 G/DL — LOW (ref 13–17)
HMPV RNA SPEC QL NAA+PROBE: SIGNIFICANT CHANGE UP
HPIV1 RNA SPEC QL NAA+PROBE: SIGNIFICANT CHANGE UP
HPIV2 RNA SPEC QL NAA+PROBE: SIGNIFICANT CHANGE UP
HPIV3 RNA SPEC QL NAA+PROBE: SIGNIFICANT CHANGE UP
HPIV4 RNA SPEC QL NAA+PROBE: SIGNIFICANT CHANGE UP
M PNEUMO DNA SPEC QL NAA+PROBE: SIGNIFICANT CHANGE UP
MAGNESIUM SERPL-MCNC: 2.7 MG/DL — HIGH (ref 1.6–2.6)
MAGNESIUM SERPL-MCNC: 2.9 MG/DL — HIGH (ref 1.6–2.6)
MCHC RBC-ENTMCNC: 24.1 PG — LOW (ref 27–34)
MCHC RBC-ENTMCNC: 32.5 G/DL — SIGNIFICANT CHANGE UP (ref 32–36)
MCV RBC AUTO: 74.3 FL — LOW (ref 80–100)
NRBC # BLD AUTO: 0.04 K/UL — HIGH (ref 0–0)
NRBC # FLD: 0.04 K/UL — HIGH (ref 0–0)
NRBC BLD AUTO-RTO: 0 /100 WBCS — SIGNIFICANT CHANGE UP (ref 0–0)
PHOSPHATE SERPL-MCNC: 1.7 MG/DL — LOW (ref 2.5–4.5)
PHOSPHATE SERPL-MCNC: 2.4 MG/DL — LOW (ref 2.5–4.5)
PLATELET # BLD AUTO: 265 K/UL — SIGNIFICANT CHANGE UP (ref 150–400)
POTASSIUM SERPL-MCNC: 3.5 MMOL/L — SIGNIFICANT CHANGE UP (ref 3.5–5.3)
POTASSIUM SERPL-MCNC: 4.2 MMOL/L — SIGNIFICANT CHANGE UP (ref 3.5–5.3)
POTASSIUM SERPL-SCNC: 3.5 MMOL/L — SIGNIFICANT CHANGE UP (ref 3.5–5.3)
POTASSIUM SERPL-SCNC: 4.2 MMOL/L — SIGNIFICANT CHANGE UP (ref 3.5–5.3)
RAPID RVP RESULT: SIGNIFICANT CHANGE UP
RBC # BLD: 3.19 M/UL — LOW (ref 4.2–5.8)
RBC # FLD: 17.5 % — HIGH (ref 10.3–14.5)
RSV RNA NPH QL NAA+NON-PROBE: SIGNIFICANT CHANGE UP
RSV RNA SPEC QL NAA+PROBE: SIGNIFICANT CHANGE UP
RV+EV RNA SPEC QL NAA+PROBE: SIGNIFICANT CHANGE UP
SARS-COV-2 RNA SPEC QL NAA+PROBE: SIGNIFICANT CHANGE UP
SARS-COV-2 RNA SPEC QL NAA+PROBE: SIGNIFICANT CHANGE UP
SODIUM SERPL-SCNC: 128 MMOL/L — LOW (ref 135–145)
SODIUM SERPL-SCNC: 136 MMOL/L — SIGNIFICANT CHANGE UP (ref 135–145)
WBC # BLD: 13.65 K/UL — HIGH (ref 3.8–10.5)
WBC # FLD AUTO: 13.65 K/UL — HIGH (ref 3.8–10.5)

## 2025-03-03 PROCEDURE — 99223 1ST HOSP IP/OBS HIGH 75: CPT

## 2025-03-03 PROCEDURE — 71045 X-RAY EXAM CHEST 1 VIEW: CPT | Mod: 26

## 2025-03-03 PROCEDURE — 99233 SBSQ HOSP IP/OBS HIGH 50: CPT | Mod: FS,GC

## 2025-03-03 RX ORDER — PIPERACILLIN-TAZO-DEXTROSE,ISO 3.375G/5
3.38 IV SOLUTION, PIGGYBACK PREMIX FROZEN(ML) INTRAVENOUS ONCE
Refills: 0 | Status: COMPLETED | OUTPATIENT
Start: 2025-03-03 | End: 2025-03-03

## 2025-03-03 RX ORDER — SOD PHOS DI, MONO/K PHOS MONO 250 MG
1 TABLET ORAL
Refills: 0 | Status: COMPLETED | OUTPATIENT
Start: 2025-03-03 | End: 2025-03-03

## 2025-03-03 RX ORDER — APIXABAN 2.5 MG/1
5 TABLET, FILM COATED ORAL EVERY 12 HOURS
Refills: 0 | Status: DISCONTINUED | OUTPATIENT
Start: 2025-03-03 | End: 2025-03-07

## 2025-03-03 RX ORDER — PIPERACILLIN-TAZO-DEXTROSE,ISO 3.375G/5
3.38 IV SOLUTION, PIGGYBACK PREMIX FROZEN(ML) INTRAVENOUS EVERY 12 HOURS
Refills: 0 | Status: DISCONTINUED | OUTPATIENT
Start: 2025-03-04 | End: 2025-03-05

## 2025-03-03 RX ORDER — ACETAMINOPHEN 500 MG/5ML
1000 LIQUID (ML) ORAL ONCE
Refills: 0 | Status: COMPLETED | OUTPATIENT
Start: 2025-03-03 | End: 2025-03-03

## 2025-03-03 RX ORDER — ALBUTEROL SULFATE 2.5 MG/3ML
2.5 VIAL, NEBULIZER (ML) INHALATION EVERY 6 HOURS
Refills: 0 | Status: DISCONTINUED | OUTPATIENT
Start: 2025-03-03 | End: 2025-03-19

## 2025-03-03 RX ORDER — SOD PHOS DI, MONO/K PHOS MONO 250 MG
1 TABLET ORAL
Refills: 0 | Status: DISCONTINUED | OUTPATIENT
Start: 2025-03-03 | End: 2025-03-03

## 2025-03-03 RX ORDER — SOD PHOS DI, MONO/K PHOS MONO 250 MG
1 TABLET ORAL ONCE
Refills: 0 | Status: COMPLETED | OUTPATIENT
Start: 2025-03-03 | End: 2025-03-03

## 2025-03-03 RX ADMIN — Medication 50 MILLIGRAM(S): at 21:08

## 2025-03-03 RX ADMIN — AMLODIPINE BESYLATE 10 MILLIGRAM(S): 10 TABLET ORAL at 05:14

## 2025-03-03 RX ADMIN — INSULIN LISPRO 4: 100 INJECTION, SOLUTION INTRAVENOUS; SUBCUTANEOUS at 00:02

## 2025-03-03 RX ADMIN — ATORVASTATIN CALCIUM 20 MILLIGRAM(S): 80 TABLET, FILM COATED ORAL at 21:08

## 2025-03-03 RX ADMIN — INSULIN LISPRO 1: 100 INJECTION, SOLUTION INTRAVENOUS; SUBCUTANEOUS at 11:55

## 2025-03-03 RX ADMIN — IPRATROPIUM BROMIDE AND ALBUTEROL SULFATE 3 MILLILITER(S): .5; 2.5 SOLUTION RESPIRATORY (INHALATION) at 14:37

## 2025-03-03 RX ADMIN — Medication 2.5 MILLIGRAM(S): at 21:57

## 2025-03-03 RX ADMIN — IPRATROPIUM BROMIDE AND ALBUTEROL SULFATE 3 MILLILITER(S): .5; 2.5 SOLUTION RESPIRATORY (INHALATION) at 03:57

## 2025-03-03 RX ADMIN — Medication 4 UNIT(S): at 00:02

## 2025-03-03 RX ADMIN — Medication 4 UNIT(S): at 05:12

## 2025-03-03 RX ADMIN — INSULIN LISPRO 3: 100 INJECTION, SOLUTION INTRAVENOUS; SUBCUTANEOUS at 23:55

## 2025-03-03 RX ADMIN — Medication 1 PACKET(S): at 21:09

## 2025-03-03 RX ADMIN — Medication 15 MILLILITER(S): at 05:14

## 2025-03-03 RX ADMIN — Medication 1 APPLICATION(S): at 17:45

## 2025-03-03 RX ADMIN — APIXABAN 5 MILLIGRAM(S): 2.5 TABLET, FILM COATED ORAL at 17:51

## 2025-03-03 RX ADMIN — IPRATROPIUM BROMIDE AND ALBUTEROL SULFATE 3 MILLILITER(S): .5; 2.5 SOLUTION RESPIRATORY (INHALATION) at 07:53

## 2025-03-03 RX ADMIN — EPOETIN ALFA 10000 UNIT(S): 10000 SOLUTION INTRAVENOUS; SUBCUTANEOUS at 09:17

## 2025-03-03 RX ADMIN — Medication 300 MILLIGRAM(S): at 16:16

## 2025-03-03 RX ADMIN — Medication 400 MILLIGRAM(S): at 16:14

## 2025-03-03 RX ADMIN — Medication 25 GRAM(S): at 21:14

## 2025-03-03 RX ADMIN — Medication 50 MILLIGRAM(S): at 16:16

## 2025-03-03 RX ADMIN — Medication 40 MILLIGRAM(S): at 05:20

## 2025-03-03 RX ADMIN — ARGATROBAN 5.65 MICROGRAM(S)/KG/MIN: 100 INJECTION, SOLUTION INTRAVENOUS at 10:00

## 2025-03-03 RX ADMIN — Medication 6 UNIT(S): at 17:47

## 2025-03-03 RX ADMIN — Medication 4 UNIT(S): at 11:55

## 2025-03-03 RX ADMIN — Medication 50 MILLIGRAM(S): at 05:13

## 2025-03-03 RX ADMIN — Medication 1 PACKET(S): at 16:14

## 2025-03-03 RX ADMIN — Medication 200 GRAM(S): at 18:58

## 2025-03-03 RX ADMIN — INSULIN LISPRO 1: 100 INJECTION, SOLUTION INTRAVENOUS; SUBCUTANEOUS at 05:12

## 2025-03-03 RX ADMIN — INSULIN LISPRO 1: 100 INJECTION, SOLUTION INTRAVENOUS; SUBCUTANEOUS at 17:47

## 2025-03-03 RX ADMIN — Medication 6 UNIT(S): at 23:55

## 2025-03-03 RX ADMIN — Medication 1 TABLET(S): at 16:16

## 2025-03-03 RX ADMIN — WHITE PETROLATUM 1 APPLICATION(S): 1 OINTMENT TOPICAL at 17:50

## 2025-03-03 RX ADMIN — Medication 15 MILLILITER(S): at 17:48

## 2025-03-03 RX ADMIN — WHITE PETROLATUM 1 APPLICATION(S): 1 OINTMENT TOPICAL at 05:13

## 2025-03-03 RX ADMIN — Medication 1 PACKET(S): at 23:55

## 2025-03-03 NOTE — PROGRESS NOTE ADULT - SUBJECTIVE AND OBJECTIVE BOX
[facility]  AUGUST Carbajal Y. Patel, S. Shah, G. Casimir  [phone]  ([phone] - weekdays after 5pm and weekends)    Name: JIMMY BLAND  Age/Gender: 74y Male  MRN: [id number]    Interval History:  Notes reviewed.   No concerning overnight events.  remains on vent    Allergies: No Known Allergies      Objective:  Vitals:   T(F): 97.6 (03-03-25 @ 10:06), Max: 98.7 (03-02-25 @ 16:00)  HR: 65 (03-03-25 @ 11:45) (65 - 93)  BP: 164/64 (03-03-25 @ 10:06) (153/60 - 202/73)  RR: 18 (03-03-25 @ 06:45) (18 - 19)  SpO2: 100% (03-03-25 @ 11:45) (100% - 100%)  Physical Examination:  General: frail appearing  HEENT: anicteric, tracheostomy, on vent  Cardio: S1, S2, normal rate  Resp: clear to auscultation anteriorly   Abd: Soft. Nondistended. PEG  Ext: no LE edema  Skin: warm, dry    Laboratory Studies:  CBC:                       7.7    13.65 )-----------( 265      ( 03 Mar 2025 07:00 )             23.7     WBC Trend:  13.65 03-03-25 @ 07:00  13.96 03-02-25 @ 09:09  15.75 03-02-25 @ 01:38  15.52 03-01-25 @ 06:05  17.77 02-28-25 @ 13:30  17.98 02-27-25 @ 05:00  13.63 02-26-25 @ 12:20  13.08 02-26-25 @ 05:43  12.02 02-25-25 @ 01:30    CMP: 03-03    128[L]  |  88[L]  |  77[H]  ----------------------------<  232[H]  4.2   |  25  |  3.49[H]    Ca    9.1      03 Mar 2025 07:00  Phos  2.4     03-03  Mg     2.90     03-03            Urinalysis Basic - ( 03 Mar 2025 07:00 )    Color: x / Appearance: x / SG: x / pH: x  Gluc: 232 mg/dL / Ketone: x  / Bili: x / Urobili: x   Blood: x / Protein: x / Nitrite: x   Leuk Esterase: x / RBC: x / WBC x   Sq Epi: x / Non Sq Epi: x / Bacteria: x      Microbiology: reviewed     Culture - Fungal, Bronchial (collected 02-26-25 @ 16:43)  Source: Bronchial Bronchial Lavage  Preliminary Report (03-02-25 @ 09:57):    No growth    Culture - Bronchial (collected 02-26-25 @ 16:43)  Source: Bronchial Bronchial Lavage  Gram Stain (02-26-25 @ 23:04):    Few polymorphonuclear leukocytes per low power field    No squamous epithelial cells    Few Gram Negative Rods per oil power field  Final Report (02-28-25 @ 17:24):    Few Pseudomonas aeruginosa    Commensal dominick consistent with body site  Organism: Pseudomonas aeruginosa (02-28-25 @ 17:24)  Organism: Pseudomonas aeruginosa (02-28-25 @ 17:24)      Method Type: VIDA      -  Aztreonam: S <=4      -  Cefepime: S <=2      -  Ceftazidime: S <=1      -  Ciprofloxacin: S <=0.25      -  Imipenem: S 2      -  Levofloxacin: S <=0.5      -  Meropenem: S <=1      -  Piperacillin/Tazobactam: S <=8    Culture - Urine (collected 02-20-25 @ 18:00)  Source: Clean Catch Clean Catch (Midstream)  Final Report (02-23-25 @ 08:45):    >100,000 CFU/ml Enterococcus faecium  Organism: Enterococcus faecium (02-23-25 @ 08:45)  Organism: Enterococcus faecium (02-23-25 @ 08:45)      Method Type: VIDA      -  Ampicillin: R >8 Predicts results to ampicillin/sulbactam, amoxacillin-clavulanate and  piperacillin-tazobactam.      -  Ciprofloxacin: R >2      -  Levofloxacin: R >4      -  Nitrofurantoin: S <=32 Should not be used to treat pyelonephritis.      -  Tetracycline: R >8      -  Vancomycin: S 0.5    Culture - INF Candida auris (collected 02-19-25 @ 18:46)  Source: Nares/Axilla/Groin Nares/Axilla/Groin  Final Report (02-22-25 @ 21:59):    Culture POSITIVE for Candida auris, susceptibilities not performed for    this specimen type.    This surveillance culture is intended for Infection Control purposes only.    Culture - Sputum (collected 02-19-25 @ 15:54)  Source: Trach Asp Tracheal Aspirate  Gram Stain (02-19-25 @ 22:37):    Numerous polymorphonuclear leukocytes per low power field    Rare Squamous epithelial cells per low power field    Numerous Gram Negative Rods seen per oil power field    Few Gram Positive Rods seen per oil power field  Final Report (02-21-25 @ 21:20):    Few Pseudomonas aeruginosa (Carbapenem Resistant)    Commensal dominick consistent with body site  Organism: Pseudomonas aeruginosa (Carbapenem Resistant) (02-21-25 @ 21:20)  Organism: Pseudomonas aeruginosa (Carbapenem Resistant) (02-21-25 @ 21:20)      Method Type: CarbaR      -  Resistance Gene to Carbapenem: Nondet  Organism: Pseudomonas aeruginosa (Carbapenem Resistant) (02-21-25 @ 21:20)      Method Type: VIDA      -  Aztreonam: R >16      -  Cefepime: S 8      -  Ceftazidime: I 16      -  Ceftazidime/Avibactam: S <=4      -  Ceftolozane/tazobactam: S <=2      -  Ciprofloxacin: S <=0.25      -  Imipenem: R >8      -  Levofloxacin: S <=0.5      -  Meropenem: R >8      -  Piperacillin/Tazobactam: R 64    Culture - Urine (collected 02-18-25 @ 18:03)  Source: Clean Catch Clean Catch (Midstream)  Final Report (02-20-25 @ 15:06):    >100,000 CFU/ml Enterococcus faecium  Organism: Enterococcus faecium (02-20-25 @ 15:06)  Organism: Enterococcus faecium (02-20-25 @ 15:06)      Method Type: VIDA      -  Ampicillin: R >8 Predicts results to ampicillin/sulbactam, amoxacillin-clavulanate and  piperacillin-tazobactam.      -  Ciprofloxacin: R >2      -  Levofloxacin: R >4      -  Nitrofurantoin: S <=32 Should not be used to treat pyelonephritis.      -  Tetracycline: R >8      -  Vancomycin: S 0.5    Culture - Blood (collected 02-18-25 @ 16:05)  Source: .Blood Blood-Venous  Final Report (02-23-25 @ 22:01):    No growth at 5 days    Culture - Blood (collected 02-18-25 @ 16:00)  Source: .Blood Blood-Peripheral  Final Report (02-23-25 @ 22:01):    No growth at 5 days        Radiology: reviewed     Medications:  albuterol/ipratropium for Nebulization 3 milliLiter(s) Nebulizer every 6 hours  amLODIPine   Tablet 10 milliGRAM(s) Oral daily  apixaban 5 milliGRAM(s) Oral every 12 hours  AQUAPHOR (petrolatum Ointment) 1 Application(s) Topical two times a day  atorvastatin 20 milliGRAM(s) Oral at bedtime  chlorhexidine 0.12% Liquid 15 milliLiter(s) Oral Mucosa every 12 hours  chlorhexidine 2% Cloths 1 Application(s) Topical daily  dextrose 5%. 1000 milliLiter(s) IV Continuous <Continuous>  dextrose 5%. 1000 milliLiter(s) IV Continuous <Continuous>  dextrose 50% Injectable 25 Gram(s) IV Push once  dextrose 50% Injectable 12.5 Gram(s) IV Push once  dextrose 50% Injectable 25 Gram(s) IV Push once  dextrose Oral Gel 15 Gram(s) Oral once PRN  epoetin reilly-epbx (RETACRIT) Injectable 31818 Unit(s) IV Push <User Schedule>  ferrous    sulfate Liquid 300 milliGRAM(s) Enteral Tube daily  glucagon  Injectable 1 milliGRAM(s) IntraMuscular once  hydrALAZINE 50 milliGRAM(s) Oral three times a day  insulin lispro (ADMELOG) corrective regimen sliding scale   SubCutaneous every 6 hours  insulin NPH human recombinant 6 Unit(s) SubCutaneous every 6 hours  Nephro-noam 1 Tablet(s) Oral daily  pantoprazole   Suspension 40 milliGRAM(s) Oral before breakfast  potassium phosphate / sodium phosphate Powder (PHOS-NaK) 1 Packet(s) Oral once  sodium chloride 0.9% Bolus. 100 milliLiter(s) IV Bolus every 5 minutes PRN    Antimicrobials:

## 2025-03-03 NOTE — PROGRESS NOTE ADULT - SUBJECTIVE AND OBJECTIVE BOX
[facility]   MD ELIANE BHAGAT MD MARIA SANTIAGO, NP    TEL:  OFFICE: [phone]  From 5pm-7am Answering Service [phone]    -- RENAL FOLLOW UP NOTE ---Date of Service 03-03-25 @ 13:27    Patient is a 74y old  Male who presents with a chief complaint of leukocytosis       Patient seen and examined at bedside. Patient has trach to vent, in no acute distress.    VITALS:  T(F): 97.6 (03-03-25 @ 10:06), Max: 98.7 (03-02-25 @ 16:00)  HR: 65 (03-03-25 @ 11:45)  BP: 164/64 (03-03-25 @ 10:06)  RR: 18 (03-03-25 @ 06:45)  SpO2: 100% (03-03-25 @ 11:45)  Wt(kg): --    03-02 @ 07:01  -  03-03 @ 07:00  --------------------------------------------------------  IN: 1260 mL / OUT: 0 mL / NET: 1260 mL    03-03 @ 07:01  -  03-03 @ 13:27  --------------------------------------------------------  IN: 500 mL / OUT: 2500 mL / NET: -2000 mL          PHYSICAL EXAM:  General: NAD  Neck: No JVD  Respiratory: +rhonchi; +trach to vent  Cardiovascular: S1, S2, RRR  Gastrointestinal: BS+, soft, NT/ND; +peg  Extremities: No peripheral edema    Hospital Medications:   MEDICATIONS  (STANDING):  albuterol/ipratropium for Nebulization 3 milliLiter(s) Nebulizer every 6 hours  amLODIPine   Tablet 10 milliGRAM(s) Oral daily  apixaban 5 milliGRAM(s) Oral every 12 hours  AQUAPHOR (petrolatum Ointment) 1 Application(s) Topical two times a day  atorvastatin 20 milliGRAM(s) Oral at bedtime  chlorhexidine 0.12% Liquid 15 milliLiter(s) Oral Mucosa every 12 hours  chlorhexidine 2% Cloths 1 Application(s) Topical daily  dextrose 5%. 1000 milliLiter(s) (100 mL/Hr) IV Continuous <Continuous>  dextrose 5%. 1000 milliLiter(s) (50 mL/Hr) IV Continuous <Continuous>  dextrose 50% Injectable 25 Gram(s) IV Push once  dextrose 50% Injectable 12.5 Gram(s) IV Push once  dextrose 50% Injectable 25 Gram(s) IV Push once  epoetin reilly-epbx (RETACRIT) Injectable 94062 Unit(s) IV Push <User Schedule>  ferrous    sulfate Liquid 300 milliGRAM(s) Enteral Tube daily  glucagon  Injectable 1 milliGRAM(s) IntraMuscular once  hydrALAZINE 50 milliGRAM(s) Oral three times a day  insulin lispro (ADMELOG) corrective regimen sliding scale   SubCutaneous every 6 hours  insulin NPH human recombinant 6 Unit(s) SubCutaneous every 6 hours  Nephro-noam 1 Tablet(s) Oral daily  pantoprazole   Suspension 40 milliGRAM(s) Oral before breakfast  potassium phosphate / sodium phosphate Powder (PHOS-NaK) 1 Packet(s) Oral once      LABS:  03-03    128[L]  |  88[L]  |  77[H]  ----------------------------<  232[H]  4.2   |  25  |  3.49[H]    Ca    9.1      03 Mar 2025 07:00  Phos  2.4     03-03  Mg     2.90     03-03      Creatinine Trend: 3.49 <--, 2.98 <--, 2.74 <--, 2.04 <--, 3.10 <--, 2.03 <--, 2.59 <--, 1.78 <--    Phosphorus: 2.4 mg/dL (03-03 @ 07:00)                              7.7    13.65 )-----------( 265      ( 03 Mar 2025 07:00 )             23.7     Urine Studies:  Urinalysis - [03-03-25 @ 07:00]      Color  / Appearance  / SG  / pH       Gluc 232 / Ketone   / Bili  / Urobili        Blood  / Protein  / Leuk Est  / Nitrite       RBC  / WBC  / Hyaline  / Gran  / Sq Epi  / Non Sq Epi  / Bacteria       Iron 46, TIBC 142, %sat 32      [02-19-25 @ 11:39]  Ferritin 3601      [02-19-25 @ 11:39]  PTH -- (Ca --)      [05-26-24 @ 01:20]   122  TSH 1.660      [10-05-24 @ 08:37]  Lipid: chol --, , HDL --, LDL --      [10-18-24 @ 06:00]    HBsAb 654.8      [02-19-25 @ 19:05]  HBsAg Nonreact      [02-19-25 @ 19:05]  HBcAb Nonreact      [02-19-25 @ 19:05]  HCV 0.19, Nonreact      [02-19-25 @ 19:05]      RADIOLOGY & ADDITIONAL STUDIES:

## 2025-03-03 NOTE — CONSULT NOTE ADULT - TIME BILLING
Time spent for extensive review of the physical chart, electronic medical record, and documentation to obtain collateral information including but not limited to:  [ x] Inpatient records (ED, H&P, primary team, and consultants if applicable, care coordination)  [x ] Inpatient values/results (biomarkers, immunoassays, imaging, and microbiology results)  [ x] Current or proposed treatment plans  [x ] Discussion with the primary team  [x ] Discussion with the patient, surrogate decision maker, or family    Time spent: 75min

## 2025-03-03 NOTE — PROGRESS NOTE ADULT - ASSESSMENT
74-year-old male hx trach/vent - , CVA, COPD, stage renal disease on dialysis 4 times a week (MTRF) history of pressure ulcer.  Patient presents the ED today for elevated white count. nephrology consulted for dialysis needs    ESRD:  from [facility]  On HD MTTsF via an A-V fistula. s/p fistulogram by vascular 2/18  Continue MWF in hospital  consent from wife in dialysis unit  s/p hd 2/26 uf 2.5L, 2/28 uf 2L;   s/p hd today uf 2L  monitor bmp    Hypertension:  suboptimal  continue norvasc 10 daily and hydralazine increased to 50 tid 3/2  max uf as tolerated  monitor    Anemia:  Transfuse for Hg < 7.  s/p 1 unit PRBC 2/23  epo with hd  iron sat >20  monitor    leukocytosis  sepsis work up per team    hyponatremia  in setting of esrd and hyperglycemia  optimize dm control  hd per schedule with uF  monitor   74-year-old male hx trach/vent - , CVA, COPD, stage renal disease on dialysis 4 times a week (MTRF) history of pressure ulcer.  Patient presents the ED today for elevated white count. nephrology consulted for dialysis needs    ESRD:  from [facility]  On HD MTTsF via an A-V fistula. s/p fistulogram by vascular 2/18  Continue MWF in hospital  consent from wife in dialysis unit  s/p hd 2/26 uf 2.5L, 2/28 uf 2L;   s/p hd today uf 2L  monitor bmp    Hypertension:  suboptimal  continue norvasc 10 daily and hydralazine increased to 50 tid 3/2  titrate hydralazine to 100mg TID if needed  max uf as tolerated  monitor    Anemia:  Transfuse for Hg < 7.  s/p 1 unit PRBC 2/23  epo with hd  iron sat >20  monitor    leukocytosis  sepsis work up per team    hyponatremia  in setting of esrd and hyperglycemia  optimize dm control  hd per schedule with uF  monitor

## 2025-03-03 NOTE — CONSULT NOTE ADULT - PROBLEM SELECTOR RECOMMENDATION 9
x2 with residual R hemiplegia  Baseline is non-verbal, bed-bound, however able to open eyes and at times follow simple commands, per staff able to interact with family.  Pt with multiple co-morbidities including ESRD on HD  Pending further discussions with family

## 2025-03-03 NOTE — CONSULT NOTE ADULT - SUBJECTIVE AND OBJECTIVE BOX
Date of [id number] @ 14:01    HPI:  75 yo M with hx trach/vent (last changed 10/23/24), CVA x2 with residual R hemiplegia, COPD, ESRD on HD MWF (last 2/17), pressure ulcer presenting to [facility] ED for leukocytosis. Patient was intially up in the cath lab earlier today with vascular surgery for fistulogram and noted to have a WBC of 18 and sent to ED. Patient nonverbal at baseline, history provided by son. Per son, patient at normal baseline, somewhat able to make needs known and is not complaining of any pain.      ED course:Initial vitals T 37.1, HR 74, /68, SpO2 100%. Labs notable for WBC 18, RBC 7.3 (baseline 7.2), , Na 132, K 3.2, bicarb 30, Cr 3.08 (baseline ~3), Alk phos 260, normal LFTs, VBG with pH 7.34, pCO2 65, UA with large leuk esterase, moderate blood, > 6k WBCs, and many bacteria, RVP negative.     Patient to be admitted to RCU as he is trach/vented in setting of leukocytosis 2/2 likely UTI with plan for further evaluation/management.  (18 Feb 2025 18:00)    PERTINENT PM/SXH:   Diabetes    Stage 5 chronic kidney disease not on chronic dialysis    Benign essential HTN    HLD (hyperlipidemia)    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    ESRD on hemodialysis    CVA (cerebrovascular accident)    HTN (hypertension)    Insulin dependent type 2 diabetes mellitus    History of cerebrovascular accident (CVA) with residual deficit    History of DVT of lower extremity    HLD (hyperlipidemia)    CVA (cerebrovascular accident)    Aphasia    Tracheostomy in place    PEG (percutaneous endoscopic gastrostomy) status    Presence of suprapubic catheter    GERD (gastroesophageal reflux disease)    Constipation    Pressure ulcer    No significant past surgical history    AVF (arteriovenous fistula)    Arteriovenous fistula    S/P percutaneous endoscopic gastrostomy (PEG) tube placement    History of tracheostomy    FAMILY HISTORY:  FHx: diabetes mellitus (Father, Aunt)    Family Hx substance abuse [ ]yes [ ]no    ITEMS NOT CHECKED ARE NOT PRESENT    SOCIAL HISTORY:   Significant other/partner[x ]  Children[ x]  Yazdanism/Spirituality:  Substance hx:  [ ]   Tobacco hx:  [ ]   Alcohol hx: [ ]   Home Opioid hx:  [ ] I-Stop Reference No:  Living Situation: [ ]Home  [x ]Long term care: [facility]  [ ]Rehab [ ]Other    ADVANCE DIRECTIVES:    DNR/MOLST  [ ]  Living Will  [ ]   DECISION MAKER(s):  [ ] Health Care Proxy(s)  [ x] Surrogate(s)  [ ] Guardian           Name(s): Phone Number(s):  Wife: Pauline Art #[phone]    BASELINE (I)ADL(s) (prior to admission):  [place]: [ ]Total  [ ] Moderate [x ]Dependent    Allergies    No Known Allergies    Intolerances    MEDICATIONS  (STANDING):  albuterol/ipratropium for Nebulization 3 milliLiter(s) Nebulizer every 6 hours  amLODIPine   Tablet 10 milliGRAM(s) Oral daily  apixaban 5 milliGRAM(s) Oral every 12 hours  AQUAPHOR (petrolatum Ointment) 1 Application(s) Topical two times a day  atorvastatin 20 milliGRAM(s) Oral at bedtime  chlorhexidine 0.12% Liquid 15 milliLiter(s) Oral Mucosa every 12 hours  chlorhexidine 2% Cloths 1 Application(s) Topical daily  dextrose 5%. 1000 milliLiter(s) (100 mL/Hr) IV Continuous <Continuous>  dextrose 5%. 1000 milliLiter(s) (50 mL/Hr) IV Continuous <Continuous>  dextrose 50% Injectable 25 Gram(s) IV Push once  dextrose 50% Injectable 12.5 Gram(s) IV Push once  dextrose 50% Injectable 25 Gram(s) IV Push once  epoetin reilly-epbx (RETACRIT) Injectable 16497 Unit(s) IV Push <User Schedule>  ferrous    sulfate Liquid 300 milliGRAM(s) Enteral Tube daily  glucagon  Injectable 1 milliGRAM(s) IntraMuscular once  hydrALAZINE 50 milliGRAM(s) Oral three times a day  insulin lispro (ADMELOG) corrective regimen sliding scale   SubCutaneous every 6 hours  insulin NPH human recombinant 6 Unit(s) SubCutaneous every 6 hours  Nephro-noam 1 Tablet(s) Oral daily  pantoprazole   Suspension 40 milliGRAM(s) Oral before breakfast  potassium phosphate / sodium phosphate Powder (PHOS-NaK) 1 Packet(s) Oral once    MEDICATIONS  (PRN):  dextrose Oral Gel 15 Gram(s) Oral once PRN Blood Glucose LESS THAN 70 milliGRAM(s)/deciliter  sodium chloride 0.9% Bolus. 100 milliLiter(s) IV Bolus every 5 minutes PRN SBP LESS THAN or EQUAL to 80 mmHg    PRESENT SYMPTOMS: [ x]Unable to self-report  [ ] CPOT [x ] PAINADs [x ] RDOS  Source if other than patient:  [ ]Family   [ ]Team     Pain: [ ]yes [ ]no  QOL impact -   Location -                    Aggravating factors -  Quality -  Radiation -  Timing-  Severity (0-10 scale):  Minimal acceptable level/pain goal (0-10 scale):     CPOT:    https://www.[facility].org/getattachment/[email or web address]/Critical-Care-Pain-Observation-Tool-(CPOT)    PAIN AD Score: 0  http://geriatrictoolkit.[facility]/cog/painad.pdf (press ctrl +  left click to view)    Dyspnea:                           [ ]Mild [ ]Moderate [ ]Severe    RDOS: 0  0 to 2  minimal or no respiratory distress   3  mild distress  4 to 6 moderate distress  >7 severe distress  https://homecareinformation.net/handouts/hen/Respiratory_Distress_Observation_Scale.pdf (Ctrl +  left click to view)     Anxiety:                             [ ]Mild [ ]Moderate [ ]Severe  Fatigue:                             [ ]Mild [ ]Moderate [ ]Severe  Nausea:                             [ ]Mild [ ]Moderate [ ]Severe  Loss of appetite:              [ ]Mild [ ]Moderate [ ]Severe  Constipation:                    [ ]Mild [ ]Moderate [ ]Severe    PCSSQ[Palliative Care Spiritual Screening Question]   Severity (0-10): deferred  Score of 4 or > indicate consideration of Chaplaincy referral.  Chaplaincy Referral: [ ] yes [ ] refused [ ] following [x ] Deferred     Caregiver [place]? : [ ] yes [ ] no [x ] Deferred [ ] Declined             Social work referral [ ] Patient & Family Centered Care Referral [ ]     Anticipatory Grief present?:  [ ] yes [ ] no  [x ] Deferred                  Social work referral [ ] Chaplaincy Referral[ ]    Other Symptoms:  [ ]All other review of systems negative     Palliative Performance Status Version 2: 30 %    http://[email or web address]/files/news/palliative_performance_scale_ppsv2.pdf    PHYSICAL EXAM:  Vital Signs Last 24 Hrs  T(C): 36.4 (03 Mar 2025 10:06), Max: 37.1 (02 Mar 2025 16:00)  T(F): 97.6 (03 Mar 2025 10:06), Max: 98.7 (02 Mar 2025 16:00)  HR: 65 (03 Mar 2025 11:45) (65 - 93)  BP: 164/64 (03 Mar 2025 10:06) (164/64 - 202/73)  BP(mean): 97 (03 Mar 2025 06:00) (87 - 107)  RR: 18 (03 Mar 2025 06:45) (18 - 19)  SpO2: 100% (03 Mar 2025 11:45) (100% - 100%)    Parameters below as of 03 Mar 2025 10:06  Patient On (Oxygen Delivery Method): ventilator     I&O's Summary    02 Mar 2025 07:01  -  03 Mar 2025 07:00  --------------------------------------------------------  IN: 1260 mL / OUT: 0 mL / NET: 1260 mL    03 Mar 2025 07:01  -  03 Mar 2025 14:01  --------------------------------------------------------  IN: 500 mL / OUT: 2500 mL / NET: -2000 mL      GENERAL: [ ]Cachexia    [x ]Alert  [ ]Oriented x   [ ]Lethargic  [ ]Unarousable  [ ]Verbal  [x ]Non-Verbal  Behavioral:   [ ] Anxiety  [ ] Delirium [ ] Agitation [ ] Other  HEENT:  [ ]Normal   [ ]Dry mouth   [x ]ET Tube/Trach  [ ]Oral lesions  PULMONARY:   [ ]Clear [ ]Tachypnea  [ ]Audible excessive secretions   [x ]Rhonchi        [ ]Right [ ]Left [ ]Bilateral  [ ]Crackles        [ ]Right [ ]Left [ ]Bilateral  [ ]Wheezing     [ ]Right [ ]Left [ ]Bilateral  [ ]Diminished breath sounds [ ]right [ ]left [ ]bilateral  CARDIOVASCULAR:    [ ]Regular [ ]Irregular [ ]Tachy  [ ]Porter [ x]Murmur [ ]Other  GASTROINTESTINAL:  [ x]Soft  [ ]Distended   [ x]+BS  [x ]Non tender [ ]Tender  [ ]Other [x ]PEG [ ]OGT/ NGT  Last BM: 3/2  GENITOURINARY:  [ ]Normal [ ] Incontinent   [x ]Oliguria/Anuria   [ ]Abbasi  MUSCULOSKELETAL:   [ ]Normal   [ ]Weakness  [ x]Bed/Wheelchair bound [ ]Edema  NEUROLOGIC:   [ ]No focal deficits  [ x]Cognitive impairment  [ ]Dysphagia [ ]Dysarthria [ ]Paresis [ ]Other   SKIN:   [ ]Normal  [ ]Rash  [ ]Other  x[ ]Pressure ulcer(s)       Present on admission [ ]y [ ]n    CRITICAL CARE:  [ ] Shock Present  [ ]Septic [ ]Cardiogenic [ ]Neurologic [ ]Hypovolemic  [ ]  Vasopressors [ ]  Inotropes   [ ]Respiratory failure present [ ]Mechanical ventilation [ ]Non-invasive ventilatory support [ ]High flow  Mode: AC/ CMV (Assist Control/ Continuous Mandatory Ventilation), RR (machine): 15, TV (machine): 400, FiO2: 40, PEEP: 5, ITime: 0.72, MAP: 8, PIP: 24  [ ]Acute  [ ]Chronic [ ]Hypoxic  [ ]Hypercarbic [ ]Other  [ ]Other organ failure     LABS:                        7.7    13.65 )-----------( 265      ( 03 Mar 2025 07:00 )             23.7   03-03    128[L]  |  88[L]  |  77[H]  ----------------------------<  232[H]  4.2   |  25  |  3.49[H]    Ca    9.1      03 Mar 2025 07:00  Phos  2.4     03-03  Mg     2.90     03-03    PT/INR - ( 02 Mar 2025 22:36 )   PT: 25.0 sec;   INR: 2.12 ratio         PTT - ( 02 Mar 2025 22:36 )  PTT:61.4 sec    Urinalysis Basic - ( 03 Mar 2025 07:00 )    Color: x / Appearance: x / SG: x / pH: x  Gluc: 232 mg/dL / Ketone: x  / Bili: x / Urobili: x   Blood: x / Protein: x / Nitrite: x   Leuk Esterase: x / RBC: x / WBC x   Sq Epi: x / Non Sq Epi: x / Bacteria: x      RADIOLOGY & ADDITIONAL STUDIES:  < from: CT Angio Chest PE Protocol w/ IV Cont (02.24.25 @ 20:45) >  IMPRESSION:  No pulmonary embolism.  Small bilateral pleural effusions, partially loculated on the left, with   bilateral lower lobe consolidations representing atelectasis with near   complete involvement of the right with mild heterogeneity of the left   lower lobe consolidation which may represent superimposed infection.    PROTEIN CALORIE MALNUTRITION PRESENT: [ ]mild [ ]moderate [ ]severe [ ]underweight [ ]morbid obesity  https://www.andeal.org/vault/2440/web/files/ONC/Table_Clinical%20Characteristics%20to%20Document%20Malnutrition-White%20JV%20et%20al%115726.pdf    Height (cm): 162.6 (02-18-25 @ 14:16), 162.6 (02-18-25 @ 08:20), 172.7 (10-23-24 @ 07:29)  Weight (kg): 53.8 (02-19-25 @ 12:20), 54 (02-18-25 @ 08:20), 40.5 (10-23-24 @ 07:29)  BMI (kg/m2): 20.3 (02-19-25 @ 12:20), 20.4 (02-18-25 @ 14:16), 20.4 (02-18-25 @ 08:20)    [ x]PPSV2 < or = to 30% [ ]significant weight loss  [ ]poor nutritional intake  [ ]anasarca[ ]Artificial Nutrition      Other REFERRALS:  [ ]Hospice  [ ]Child Life  [ ]Social Work  [ ]Case management [ ]Holistic Therapy

## 2025-03-03 NOTE — PROGRESS NOTE ADULT - ASSESSMENT
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 Patent recently admitted in 6/2024 for left pontine and cerebellar CVA requiring tracheostomy. While at [facility] patient decannulated and passed SS with advanced diet to easy to chew with thin liquids, but complicated COVID requiring transfer to [facility] in 7/2024 and then ultimately discharged home. Per wife patient was doing well at home until 10/2024 when he was found with RLL with concern for aspiration requiring intubation, then tracheostomy, and ultimately discharged to NH with vent dependence in 11/2024. Patient now presented for RUE fistulogram and angioplasty with vascular, however course complicated by leukocytosis with concern for recurrent trachitis vs PNA and UTI, AOCD and hyperglycemia. Admitted to RCU for further management. Found with  PSA trachitis/ PNA and enterococcus faecium UTI. Course complicated by RUE brachial DVT and hypoxia with HD with concern for volume down vs PE?      NEUROLOGY  # Poor mentation second to metabolic encephalopathy vs psychosomatic/ depression   - Hx of L cerebellar and pontene embolic CVA x 2 with residual R hemiplegia   - AOx0, bedbound, and nonverbal at baseline per report, however per exam patient able to open eyes, able to follow commands on left (LUE>LLE) with SEVERE weakness, however noted with R hemiplegia per baseline  - Nods yes and no occasionally however keeps eyes closed likely psychosomatic vs metabolic encephalopathy with infections?   - Last CTH in 10/2024 with no acute findings   - Hold [facility] CT for now   - Monitor mentation     CARDIOVASCULAR  # Hx of HTN and HLD  - Continue on hydral 25 and norvasc 10   - Monitor BP     # Incidental Pericardial effusion   - Incidental small pericardial effusion seen adjacent to right ventricle, however IVC appears flat and LE without edema with low concern for RV failure.   - Prior TTE reviewed from 4/2024 with normal RVLVSF with no pericardial effusion noted at the time.   - TTE 2/23 with EF 65, normal LVRVSF, mild LAD, normal RA, mild pulmonary hypertension, and small pericardial effusion noted adjacent to the anterior right ventricle with no echocardiographic evidence of tamponade  - Monitor hemodynamics     RESPIRATORY  # Respiratory failure second to PNA vs volume down vs PE?   - Hx of COPD with chronic respiratory failure with tracheostomy and vent dependence  - Admitted for angioplasty of RUE AVF, however course complicated by leukocytosis with concern for recurrent trachitis/ PNA.   - Course complicated by hypoxia during HD likely volume down vs migration of RUE brachial DVT with PE?   - Held volume removal, bolus given, and hypoxia improved.   - CTA CHEST without PE  - Continue on vent 15/400/50  - Hold PS for now  - Continue on nebs     HEENT   # Hemoptysis   - Wife reported hemoptysis while at nursing home 2 weeks prior to admission   - No hemoptysis noted on admission   - Hemoptysis returned in house   - CTA CHEST 2/24 without PE  - Bronch 2/26 with no evidence of bleeding   - Bleeding resolved     GI  # Dysphagia with PEG   - Passed SS with advanced diet to easy to chew with thin liquids in 7/2024, however since recurrent aspiration in 10/2024 now with PEG/ vent dependence   - Continue on PEG TF and changed to bolus feeds   - Monitor tolerance     RENAL  # ESRD on HD MTTHF   # AVF trouble  # Dehydration   - Presented for RUE fistulogram and angioplasty with vascular on 2/18   - Resumed on HD with no flow issue from AVF   - Course complicated by hypoxia with concern for volume down given small IVC and elevated lactate on 2/21  - Volume removal held, lactate improved, and hypoxia improved.   - Monitor volume status and continue on HD MTTF per renal   - Monitor renal function, electrolytes and UOP     # Hyponatremia likely volume down   - Concern for volume issues, however overall appears volume down with serum osmo 307  - Sodium 128 and improved with HD/ bolus during HD.   - Trend sodium with HD for now     # Elevated lactate   - Lactate elevated likely second to volume down?   - Post bolus lactate trending down from 3.3 to 2.7 to 1.3    INFECTIOUS DISEASE  # Hemoptysis   - Hemoptysis as above   - BAL with PSA as prior   - No fever and monitor off ABX     # Trachitis vs PNA/ UTI   - Hx of steno and PSA in sputum   - Flu/ COVID negative   - MRSA negative   - UCx with enterococcus   - SCx with  PSA   - Found with leukocytosis and started on cefepime and christiano (2/19) and vanco on HD days (2/21).   - No further steno seen in sputum and continue on cefepime (2/19 - 2/25) and vanco on HD days (2/21, 2/22 - 2/25)   - WBC increased likely reactive to hypoxic event and now improving  - ID following observe off abx     # PPX   - MRSA PCR negative  - Candida auris PCR POSITIVE  - Continue on isolation precautions per IC    HEME  # AOCD   - Presented for angioplasty and found with anemia to 6.7 s/p 1U PRBC 2/19 and 1/2 PRBC 2/23   - Anemia panel with concern for AOCD   - Continue on EPO QIW and Fe   - Monitor HH     # Heparin resistance?   - PTT persistently low despite increasing heparin GTT   - Resume on heparin GTT and anti Xa level supra therapeutic on but PTT remained low  - Patient ultimately with concern for heparin resistance    VASCULAR  # RUE DVT   - RUE edema noted with age-indeterminate, non-occlusive deep vein thrombosis noted in the right brachial vein.   - Case discussed with vascular who recommends full AC   - CTH from prior with encephalomalcia, however no hx of intracranial bleed   - Prior UGIB while on AC, however discharged on eliquis in 7/2024 and completed 6 month course for RLE DVT   - Continue on heparin GTT, however held for hemoptysis and resistance   - Continued on argatroban with good tolerance   - Change to eliquis today      ENDOCRINE  # DM2 A1C 14.0 (1/2024) > 6.4 (2/2025)  - Presented with hyperglycemia and continued on lantus and ISS   - Increase lantus to 28U QHS and ISS, however FS continues to trend in 200s   - Adjust insulin to NPH 16U Q6H (1200, 1800 and 0000) with tube feeds (1361-5575) and FS improved, however NPO and FS dropped   - NPH resumed at 6U Q6H with bolus feeds   - Monitor FS and adjust as needed    ETHICS/ GOC    - FULL CODE   - Wife wishes for palliative discussion   - Palliative meeting tomorrow at 10AM     DISPO - Back to NH when able

## 2025-03-03 NOTE — PROGRESS NOTE ADULT - NS ATTEND AMEND GEN_ALL_CORE FT
agree with above  ID f/u appreciated, monitoring off abx  change to DOAC  d/w wife at bedside  palliative care consulted, requested by pt's wife. meeting tomorrow

## 2025-03-03 NOTE — PROGRESS NOTE ADULT - ASSESSMENT
75 y/o M PMhx trach/vent (last changed 10/23/24), CVA x2 with residual R hemiplegia, COPD, ESRD on HD MWF who presented to ED for leukocytosis when initially planned for fistulogram and noted to have a WBC of 18.     VAP- treated  SARS-CoV-2/ RSV/ flu PCR negative  MRSA PCR negative  CXR- Right lower lobe infiltrate  blood cultures- NGTD  resp cx w/  pseudomonas  s/p cefepime x 7 days, completed 2/25  discussed w/ RICU team, bronch appeared clear  noted bronch cx w/ pseudomonas, suspect trach is colonized w/ pseudomonas    UTI- treated  UA w/ significant pyuria though noted epithelial cells indicating contamination  unable to assess urinary symptoms given patient's mentation  repeat urine cx also w/ E faecium  s/p vanc course, completed 2/25    DVT  RUE US- age-indeterminate, non-occlusive deep vein thrombosis noted in the right brachial vein  on eliquis    ESRD  HD per nephrology    leukocytosis stable to decreased  possibly reactive    Recommendations  monitor off antibiotics  aspiration precautions  contact isolation for candida auris  noted plans for palliative care consult    [facility]  [phone]  After 5pm on weekdays and all day on weekends - please call [phone]  Available on [employer] teams    Thank you for consulting us and involving us in the management of this patients case. In addition to reviewing history, imaging, documents, labs, microbiology, took into account antibiotic stewardship, local antibiogram and infection control strategies and potential transmission issues.

## 2025-03-03 NOTE — CHART NOTE - NSCHARTNOTEFT_GEN_A_CORE
RCU PA NOTE     Called by RN for fever of 101.8F and pending CXR, BCx, SCx and FULL RVP. Discussed with ID and holding ABX unless work up positive. Will continue to monitor patient.     JANET Mercedes, PA-C  Department of Medicine/ RCU  In house RCU [facility] [id number]  In house Medicine Beeper [id number]  Reachable via teams

## 2025-03-03 NOTE — CONSULT NOTE ADULT - PROBLEM SELECTOR RECOMMENDATION 2
Trache, had been weaned off vent however had episode of aspiration and now dependent on vent again  Had been in vent dependent NH  Ongoing GOC

## 2025-03-03 NOTE — CONSULT NOTE ADULT - ASSESSMENT
73 yo M with hx trach/vent (last changed 10/23/24), CVA x2 with residual R hemiplegia, COPD, ESRD on HD MWF (last 2/17), pressure ulcer presenting to [facility] ED for leukocytosis. Patient was intially up in the cath lab earlier today with vascular surgery for fistulogram and noted to have a WBC of 18 and sent to ED. Patient nonverbal at baseline, history provided by son. Pt admitted for acute on chronic respiratory failure. Palliative Care encounter for complex decision making in the setting of advanced illness.

## 2025-03-03 NOTE — PROGRESS NOTE ADULT - SUBJECTIVE AND OBJECTIVE BOX
RCU PROGRESS NOTE     CHIEF COMPLAINT: Patient is a 74y old  Male who presents with a chief complaint of leukocytosis (20 Feb 2025 13:41)      INTERVAL EVENTS:    REVIEW OF SYSTEMS: Seen by bedside during AM rounds and     Mode: AC/ CMV (Assist Control/ Continuous Mandatory Ventilation), RR (machine): 15, TV (machine): 400, FiO2: 40, PEEP: 5, ITime: 0.72, MAP: 9, PIP: 26      OBJECTIVE:  ICU Vital Signs Last 24 Hrs  T(C): 36.8 (03 Mar 2025 06:45), Max: 37.1 (02 Mar 2025 16:00)  T(F): 98.3 (03 Mar 2025 06:45), Max: 98.7 (02 Mar 2025 16:00)  HR: 75 (03 Mar 2025 07:56) (72 - 90)  BP: 175/62 (03 Mar 2025 06:45) (153/60 - 202/73)  BP(mean): 97 (03 Mar 2025 06:00) (87 - 107)  ABP: --  ABP(mean): --  RR: 18 (03 Mar 2025 06:45) (18 - 19)  SpO2: 100% (03 Mar 2025 07:56) (100% - 100%)    O2 Parameters below as of 03 Mar 2025 07:56  Patient On (Oxygen Delivery Method): ventilator          Mode: AC/ CMV (Assist Control/ Continuous Mandatory Ventilation), RR (machine): 15, TV (machine): 400, FiO2: 40, PEEP: 5, ITime: 0.72, MAP: 9, PIP: 26    03-02 @ 07:01  -  03-03 @ 07:00  --------------------------------------------------------  IN: 1260 mL / OUT: 0 mL / NET: 1260 mL      CAPILLARY BLOOD GLUCOSE      POCT Blood Glucose.: 195 mg/dL (03 Mar 2025 05:11)      HOSPITAL MEDICATIONS:  MEDICATIONS  (STANDING):  albuterol/ipratropium for Nebulization 3 milliLiter(s) Nebulizer every 6 hours  amLODIPine   Tablet 10 milliGRAM(s) Oral daily  AQUAPHOR (petrolatum Ointment) 1 Application(s) Topical two times a day  argatroban Infusion 1.75 MICROgram(s)/kG/Min (5.65 mL/Hr) IV Continuous <Continuous>  atorvastatin 20 milliGRAM(s) Oral at bedtime  chlorhexidine 0.12% Liquid 15 milliLiter(s) Oral Mucosa every 12 hours  chlorhexidine 2% Cloths 1 Application(s) Topical daily  dextrose 5%. 1000 milliLiter(s) (100 mL/Hr) IV Continuous <Continuous>  dextrose 5%. 1000 milliLiter(s) (50 mL/Hr) IV Continuous <Continuous>  dextrose 50% Injectable 25 Gram(s) IV Push once  dextrose 50% Injectable 12.5 Gram(s) IV Push once  dextrose 50% Injectable 25 Gram(s) IV Push once  epoetin reilly-epbx (RETACRIT) Injectable 06566 Unit(s) IV Push <User Schedule>  ferrous    sulfate Liquid 300 milliGRAM(s) Enteral Tube daily  glucagon  Injectable 1 milliGRAM(s) IntraMuscular once  hydrALAZINE 50 milliGRAM(s) Oral three times a day  insulin lispro (ADMELOG) corrective regimen sliding scale   SubCutaneous every 6 hours  insulin NPH human recombinant 4 Unit(s) SubCutaneous every 6 hours  Nephro-onam 1 Tablet(s) Oral daily  pantoprazole   Suspension 40 milliGRAM(s) Oral before breakfast    MEDICATIONS  (PRN):  dextrose Oral Gel 15 Gram(s) Oral once PRN Blood Glucose LESS THAN 70 milliGRAM(s)/deciliter  sodium chloride 0.9% Bolus. 100 milliLiter(s) IV Bolus every 5 minutes PRN SBP LESS THAN or EQUAL to 80 mmHg      PHYSICAL EXAMINATION    LABS:                        7.7    13.65 )-----------( 265      ( 03 Mar 2025 07:00 )             23.7     03-03    128[L]  |  88[L]  |  77[H]  ----------------------------<  232[H]  4.2   |  25  |  3.49[H]    Ca    9.1      03 Mar 2025 07:00  Phos  2.4     03-03  Mg     2.90     03-03      PT/INR - ( 02 Mar 2025 22:36 )   PT: 25.0 sec;   INR: 2.12 ratio         PTT - ( 02 Mar 2025 22:36 )  PTT:61.4 sec  Urinalysis Basic - ( 03 Mar 2025 07:00 )    Color: x / Appearance: x / SG: x / pH: x  Gluc: 232 mg/dL / Ketone: x  / Bili: x / Urobili: x   Blood: x / Protein: x / Nitrite: x   Leuk Esterase: x / RBC: x / WBC x   Sq Epi: x / Non Sq Epi: x / Bacteria: x            PAST MEDICAL & SURGICAL HISTORY:  ESRD on hemodialysis      HTN (hypertension)      Insulin dependent type 2 diabetes mellitus      History of cerebrovascular accident (CVA) with residual deficit      History of DVT of lower extremity      HLD (hyperlipidemia)      CVA (cerebrovascular accident)      Aphasia      Tracheostomy in place      PEG (percutaneous endoscopic gastrostomy) status      GERD (gastroesophageal reflux disease)      Constipation      Pressure ulcer      Arteriovenous fistula      S/P percutaneous endoscopic gastrostomy (PEG) tube placement      History of tracheostomy          FAMILY HISTORY:  FHx: diabetes mellitus (Father, Aunt)        Social History:      RADIOLOGY:  [ ] Reviewed and interpreted by me    PULMONARY FUNCTION TESTS:    EKG: RCU PROGRESS NOTE     CHIEF COMPLAINT: Patient is a 74y old  Male who presents with a chief complaint of leukocytosis (20 Feb 2025 13:41)      INTERVAL EVENTS:  - No interval events overnight  - FS running slightly high and NPH increased   - Argatroban changed to eliquis   - Palliative meeting with wife in AM   - Plans for DISPO to NH    REVIEW OF SYSTEMS: Seen by bedside during AM rounds and unable to assess ROS second to vented     Mode: AC/ CMV (Assist Control/ Continuous Mandatory Ventilation), RR (machine): 15, TV (machine): 400, FiO2: 40, PEEP: 5, ITime: 0.72, MAP: 9, PIP: 26      OBJECTIVE:  ICU Vital Signs Last 24 Hrs  T(C): 36.8 (03 Mar 2025 06:45), Max: 37.1 (02 Mar 2025 16:00)  T(F): 98.3 (03 Mar 2025 06:45), Max: 98.7 (02 Mar 2025 16:00)  HR: 75 (03 Mar 2025 07:56) (72 - 90)  BP: 175/62 (03 Mar 2025 06:45) (153/60 - 202/73)  BP(mean): 97 (03 Mar 2025 06:00) (87 - 107)  ABP: --  ABP(mean): --  RR: 18 (03 Mar 2025 06:45) (18 - 19)  SpO2: 100% (03 Mar 2025 07:56) (100% - 100%)    O2 Parameters below as of 03 Mar 2025 07:56  Patient On (Oxygen Delivery Method): ventilator          Mode: AC/ CMV (Assist Control/ Continuous Mandatory Ventilation), RR (machine): 15, TV (machine): 400, FiO2: 40, PEEP: 5, ITime: 0.72, MAP: 9, PIP: 26    03-02 @ 07:01  -  03-03 @ 07:00  --------------------------------------------------------  IN: 1260 mL / OUT: 0 mL / NET: 1260 mL      CAPILLARY BLOOD GLUCOSE  POCT Blood Glucose.: 195 mg/dL (03 Mar 2025 05:11)      HOSPITAL MEDICATIONS:  MEDICATIONS  (STANDING):  albuterol/ipratropium for Nebulization 3 milliLiter(s) Nebulizer every 6 hours  amLODIPine   Tablet 10 milliGRAM(s) Oral daily  AQUAPHOR (petrolatum Ointment) 1 Application(s) Topical two times a day  argatroban Infusion 1.75 MICROgram(s)/kG/Min (5.65 mL/Hr) IV Continuous <Continuous>  atorvastatin 20 milliGRAM(s) Oral at bedtime  chlorhexidine 0.12% Liquid 15 milliLiter(s) Oral Mucosa every 12 hours  chlorhexidine 2% Cloths 1 Application(s) Topical daily  dextrose 5%. 1000 milliLiter(s) (100 mL/Hr) IV Continuous <Continuous>  dextrose 5%. 1000 milliLiter(s) (50 mL/Hr) IV Continuous <Continuous>  dextrose 50% Injectable 25 Gram(s) IV Push once  dextrose 50% Injectable 12.5 Gram(s) IV Push once  dextrose 50% Injectable 25 Gram(s) IV Push once  epoetin reilly-epbx (RETACRIT) Injectable 81517 Unit(s) IV Push <User Schedule>  ferrous    sulfate Liquid 300 milliGRAM(s) Enteral Tube daily  glucagon  Injectable 1 milliGRAM(s) IntraMuscular once  hydrALAZINE 50 milliGRAM(s) Oral three times a day  insulin lispro (ADMELOG) corrective regimen sliding scale   SubCutaneous every 6 hours  insulin NPH human recombinant 4 Unit(s) SubCutaneous every 6 hours  Nephro-noam 1 Tablet(s) Oral daily  pantoprazole   Suspension 40 milliGRAM(s) Oral before breakfast    MEDICATIONS  (PRN):  dextrose Oral Gel 15 Gram(s) Oral once PRN Blood Glucose LESS THAN 70 milliGRAM(s)/deciliter  sodium chloride 0.9% Bolus. 100 milliLiter(s) IV Bolus every 5 minutes PRN SBP LESS THAN or EQUAL to 80 mmHg      PHYSICAL EXAMINATION  General: NAD   HEENT: Trach present   Cards: S1/S2, no murmurs   Pulm: Course vent sounds bilaterally. No wheezes.   Abdomen: Soft, nondistended and nontender. PEG present  Extremities: No pedal edema. No active THAI of BL upper and lower extremities/ refusal to cooperate  Neurology: Awakens and opens eyes, able to nod yes and no occasionally however refuses to cooperate with no acute focal neurological deficits     LABS:                        7.7    13.65 )-----------( 265      ( 03 Mar 2025 07:00 )             23.7     03-03    128[L]  |  88[L]  |  77[H]  ----------------------------<  232[H]  4.2   |  25  |  3.49[H]    Ca    9.1      03 Mar 2025 07:00  Phos  2.4     03-03  Mg     2.90     03-03      PT/INR - ( 02 Mar 2025 22:36 )   PT: 25.0 sec;   INR: 2.12 ratio      PTT - ( 02 Mar 2025 22:36 )  PTT:61.4 sec    Urinalysis Basic - ( 03 Mar 2025 07:00 )  Color: x / Appearance: x / SG: x / pH: x  Gluc: 232 mg/dL / Ketone: x  / Bili: x / Urobili: x   Blood: x / Protein: x / Nitrite: x   Leuk Esterase: x / RBC: x / WBC x   Sq Epi: x / Non Sq Epi: x / Bacteria: x            PAST MEDICAL & SURGICAL HISTORY:  ESRD on hemodialysis      HTN (hypertension)      Insulin dependent type 2 diabetes mellitus      History of cerebrovascular accident (CVA) with residual deficit      History of DVT of lower extremity      HLD (hyperlipidemia)      CVA (cerebrovascular accident)      Aphasia      Tracheostomy in place      PEG (percutaneous endoscopic gastrostomy) status      GERD (gastroesophageal reflux disease)      Constipation      Pressure ulcer      Arteriovenous fistula      S/P percutaneous endoscopic gastrostomy (PEG) tube placement      History of tracheostomy          FAMILY HISTORY:  FHx: diabetes mellitus (Father, Aunt)        Social History:      RADIOLOGY:  [ ] Reviewed and interpreted by me    PULMONARY FUNCTION TESTS:    EKG:

## 2025-03-03 NOTE — CONSULT NOTE ADULT - PROBLEM SELECTOR RECOMMENDATION 4
Pt is full code, pending further [facility] discussions  Surrogate is patient's spouse  We will meet in person tomorrow at 10:30AM

## 2025-03-03 NOTE — CHART NOTE - NSCHARTNOTEFT_GEN_A_CORE
RCU PA NOTE     CXR with LLL opacity vs mucous plug.   In setting of fever earlier will start zosyn empirically per prior PSA in sputum sensitivities   Pending additional infectious work up.   Will continue to monitor patient.     JANET Mercedes, PA-C  Department of Medicine/ RCU  In house RCU [facility] [id number]  In house Medicine Beeper [id number]  Reachable via teams

## 2025-03-04 DIAGNOSIS — N18.6 END STAGE RENAL DISEASE: ICD-10-CM

## 2025-03-04 DIAGNOSIS — E78.5 HYPERLIPIDEMIA, UNSPECIFIED: ICD-10-CM

## 2025-03-04 DIAGNOSIS — E11.22 TYPE 2 DIABETES MELLITUS WITH DIABETIC CHRONIC KIDNEY DISEASE: ICD-10-CM

## 2025-03-04 DIAGNOSIS — I10 ESSENTIAL (PRIMARY) HYPERTENSION: ICD-10-CM

## 2025-03-04 LAB
ADD ON TEST-SPECIMEN IN LAB: SIGNIFICANT CHANGE UP
ALBUMIN SERPL ELPH-MCNC: 3 G/DL — LOW (ref 3.3–5)
ANION GAP SERPL CALC-SCNC: 15 MMOL/L — HIGH (ref 7–14)
CALCIUM SERPL-MCNC: 9.4 MG/DL — SIGNIFICANT CHANGE UP (ref 8.4–10.5)
CHLORIDE SERPL-SCNC: 90 MMOL/L — LOW (ref 98–107)
CO2 SERPL-SCNC: 27 MMOL/L — SIGNIFICANT CHANGE UP (ref 22–31)
CREAT SERPL-MCNC: 2.8 MG/DL — HIGH (ref 0.5–1.3)
EGFR: 23 ML/MIN/1.73M2 — LOW
EGFR: 23 ML/MIN/1.73M2 — LOW
GLUCOSE BLDC GLUCOMTR-MCNC: 214 MG/DL — HIGH (ref 70–99)
GLUCOSE BLDC GLUCOMTR-MCNC: 233 MG/DL — HIGH (ref 70–99)
GLUCOSE BLDC GLUCOMTR-MCNC: 289 MG/DL — HIGH (ref 70–99)
GLUCOSE BLDC GLUCOMTR-MCNC: 291 MG/DL — HIGH (ref 70–99)
GLUCOSE BLDC GLUCOMTR-MCNC: 76 MG/DL — SIGNIFICANT CHANGE UP (ref 70–99)
GLUCOSE SERPL-MCNC: 257 MG/DL — HIGH (ref 70–99)
GRAM STN FLD: ABNORMAL
MAGNESIUM SERPL-MCNC: 2.8 MG/DL — HIGH (ref 1.6–2.6)
PHOSPHATE SERPL-MCNC: 2.6 MG/DL — SIGNIFICANT CHANGE UP (ref 2.5–4.5)
POTASSIUM SERPL-MCNC: 4.2 MMOL/L — SIGNIFICANT CHANGE UP (ref 3.5–5.3)
POTASSIUM SERPL-SCNC: 4.2 MMOL/L — SIGNIFICANT CHANGE UP (ref 3.5–5.3)
SODIUM SERPL-SCNC: 132 MMOL/L — LOW (ref 135–145)
SPECIMEN SOURCE: SIGNIFICANT CHANGE UP

## 2025-03-04 PROCEDURE — 99233 SBSQ HOSP IP/OBS HIGH 50: CPT | Mod: FS

## 2025-03-04 PROCEDURE — 99222 1ST HOSP IP/OBS MODERATE 55: CPT

## 2025-03-04 PROCEDURE — 71045 X-RAY EXAM CHEST 1 VIEW: CPT | Mod: 26

## 2025-03-04 RX ORDER — INSULIN LISPRO 100 U/ML
4 INJECTION, SOLUTION INTRAVENOUS; SUBCUTANEOUS EVERY 6 HOURS
Refills: 0 | Status: DISCONTINUED | OUTPATIENT
Start: 2025-03-04 | End: 2025-03-04

## 2025-03-04 RX ORDER — INSULIN LISPRO 100 U/ML
4 INJECTION, SOLUTION INTRAVENOUS; SUBCUTANEOUS EVERY 6 HOURS
Refills: 0 | Status: DISCONTINUED | OUTPATIENT
Start: 2025-03-04 | End: 2025-03-06

## 2025-03-04 RX ORDER — INSULIN GLARGINE-YFGN 100 [IU]/ML
12 INJECTION, SOLUTION SUBCUTANEOUS AT BEDTIME
Refills: 0 | Status: DISCONTINUED | OUTPATIENT
Start: 2025-03-04 | End: 2025-03-06

## 2025-03-04 RX ADMIN — INSULIN LISPRO 3: 100 INJECTION, SOLUTION INTRAVENOUS; SUBCUTANEOUS at 12:14

## 2025-03-04 RX ADMIN — Medication 1 TABLET(S): at 13:44

## 2025-03-04 RX ADMIN — Medication 50 MILLIGRAM(S): at 05:52

## 2025-03-04 RX ADMIN — Medication 50 MILLIGRAM(S): at 22:56

## 2025-03-04 RX ADMIN — Medication 25 GRAM(S): at 13:43

## 2025-03-04 RX ADMIN — WHITE PETROLATUM 1 APPLICATION(S): 1 OINTMENT TOPICAL at 17:43

## 2025-03-04 RX ADMIN — INSULIN LISPRO 3: 100 INJECTION, SOLUTION INTRAVENOUS; SUBCUTANEOUS at 05:51

## 2025-03-04 RX ADMIN — INSULIN LISPRO 4 UNIT(S): 100 INJECTION, SOLUTION INTRAVENOUS; SUBCUTANEOUS at 12:15

## 2025-03-04 RX ADMIN — Medication 15 MILLILITER(S): at 17:44

## 2025-03-04 RX ADMIN — APIXABAN 5 MILLIGRAM(S): 2.5 TABLET, FILM COATED ORAL at 17:44

## 2025-03-04 RX ADMIN — Medication 2.5 MILLIGRAM(S): at 08:55

## 2025-03-04 RX ADMIN — Medication 15 MILLILITER(S): at 05:53

## 2025-03-04 RX ADMIN — INSULIN LISPRO 4 UNIT(S): 100 INJECTION, SOLUTION INTRAVENOUS; SUBCUTANEOUS at 23:13

## 2025-03-04 RX ADMIN — AMLODIPINE BESYLATE 10 MILLIGRAM(S): 10 TABLET ORAL at 05:53

## 2025-03-04 RX ADMIN — Medication 40 MILLIGRAM(S): at 05:52

## 2025-03-04 RX ADMIN — INSULIN GLARGINE-YFGN 12 UNIT(S): 100 INJECTION, SOLUTION SUBCUTANEOUS at 23:12

## 2025-03-04 RX ADMIN — Medication 6 UNIT(S): at 05:50

## 2025-03-04 RX ADMIN — WHITE PETROLATUM 1 APPLICATION(S): 1 OINTMENT TOPICAL at 05:54

## 2025-03-04 RX ADMIN — INSULIN LISPRO 4 UNIT(S): 100 INJECTION, SOLUTION INTRAVENOUS; SUBCUTANEOUS at 17:43

## 2025-03-04 RX ADMIN — Medication 1 APPLICATION(S): at 12:13

## 2025-03-04 RX ADMIN — INSULIN LISPRO 2: 100 INJECTION, SOLUTION INTRAVENOUS; SUBCUTANEOUS at 23:14

## 2025-03-04 RX ADMIN — INSULIN LISPRO 2: 100 INJECTION, SOLUTION INTRAVENOUS; SUBCUTANEOUS at 17:42

## 2025-03-04 RX ADMIN — Medication 2.5 MILLIGRAM(S): at 15:25

## 2025-03-04 RX ADMIN — APIXABAN 5 MILLIGRAM(S): 2.5 TABLET, FILM COATED ORAL at 05:55

## 2025-03-04 RX ADMIN — Medication 2.5 MILLIGRAM(S): at 03:01

## 2025-03-04 RX ADMIN — Medication 2.5 MILLIGRAM(S): at 22:12

## 2025-03-04 RX ADMIN — Medication 50 MILLIGRAM(S): at 13:46

## 2025-03-04 RX ADMIN — Medication 300 MILLIGRAM(S): at 13:43

## 2025-03-04 RX ADMIN — ATORVASTATIN CALCIUM 20 MILLIGRAM(S): 80 TABLET, FILM COATED ORAL at 22:57

## 2025-03-04 NOTE — PROGRESS NOTE ADULT - SUBJECTIVE AND OBJECTIVE BOX
[facility]  AUGUST Carbajal Y. Patel, S. Shah, G. Casimir  [phone]  ([phone] - weekdays after 5pm and weekends)    Name: JIMMY BALND  Age/Gender: 74y Male  MRN: [id number]    Interval History:  Notes reviewed.   febrile yesterday  remains on vent via trach    Allergies: No Known Allergies      Objective:  Vitals:   T(F): 97.2 (03-04-25 @ 04:00), Max: 101.8 (03-03-25 @ 15:40)  HR: 82 (03-04-25 @ 11:16) (65 - 101)  BP: 161/62 (03-04-25 @ 04:00) (142/65 - 161/62)  RR: 15 (03-04-25 @ 04:00) (10 - 19)  SpO2: 100% (03-04-25 @ 11:16) (100% - 100%)  Physical Examination:  General: frail appearing  HEENT: anicteric, tracheostomy, on vent  Cardio: S1, S2, normal rate  Resp: decreased breath sounds  Abd: Soft. Nondistended. PEG  Ext: no LE edema  Skin: warm, dry    Laboratory Studies:  CBC:                       7.7    13.65 )-----------( 265      ( 03 Mar 2025 07:00 )             23.7     WBC Trend:  13.65 03-03-25 @ 07:00  13.96 03-02-25 @ 09:09  15.75 03-02-25 @ 01:38  15.52 03-01-25 @ 06:05  17.77 02-28-25 @ 13:30  17.98 02-27-25 @ 05:00  13.63 02-26-25 @ 12:20  13.08 02-26-25 @ 05:43    CMP: 03-04    132[L]  |  90[L]  |  54[H]  ----------------------------<  257[H]  4.2   |  27  |  2.80[H]    Ca    9.4      04 Mar 2025 06:18  Phos  2.6     03-04  Mg     2.80     03-04    TPro  x   /  Alb  3.0[L]  /  TBili  x   /  DBili  x   /  AST  x   /  ALT  x   /  AlkPhos  x   03-04      LIVER FUNCTIONS - ( 04 Mar 2025 06:18 )  Alb: 3.0 g/dL / Pro: x     / ALK PHOS: x     / ALT: x     / AST: x     / GGT: x             Urinalysis Basic - ( 04 Mar 2025 06:18 )    Color: x / Appearance: x / SG: x / pH: x  Gluc: 257 mg/dL / Ketone: x  / Bili: x / Urobili: x   Blood: x / Protein: x / Nitrite: x   Leuk Esterase: x / RBC: x / WBC x   Sq Epi: x / Non Sq Epi: x / Bacteria: x      Microbiology: reviewed     Culture - Sputum (collected 03-03-25 @ 14:10)  Source: Trach Asp Tracheal Aspirate  Gram Stain (03-04-25 @ 04:28):    Few polymorphonuclear leukocytes per low power field    No Squamous epithelial cells per low power field    Moderate Gram Negative Rods seen per oil power field    Culture - Fungal, Bronchial (collected 02-26-25 @ 16:43)  Source: Bronchial Bronchial Lavage  Preliminary Report (03-02-25 @ 09:57):    No growth    Culture - Bronchial (collected 02-26-25 @ 16:43)  Source: Bronchial Bronchial Lavage  Gram Stain (02-26-25 @ 23:04):    Few polymorphonuclear leukocytes per low power field    No squamous epithelial cells    Few Gram Negative Rods per oil power field  Final Report (02-28-25 @ 17:24):    Few Pseudomonas aeruginosa    Commensal dominick consistent with body site  Organism: Pseudomonas aeruginosa (02-28-25 @ 17:24)  Organism: Pseudomonas aeruginosa (02-28-25 @ 17:24)      Method Type: VIDA      -  Aztreonam: S <=4      -  Cefepime: S <=2      -  Ceftazidime: S <=1      -  Ciprofloxacin: S <=0.25      -  Imipenem: S 2      -  Levofloxacin: S <=0.5      -  Meropenem: S <=1      -  Piperacillin/Tazobactam: S <=8    Culture - Urine (collected 02-20-25 @ 18:00)  Source: Clean Catch Clean Catch (Midstream)  Final Report (02-23-25 @ 08:45):    >100,000 CFU/ml Enterococcus faecium  Organism: Enterococcus faecium (02-23-25 @ 08:45)  Organism: Enterococcus faecium (02-23-25 @ 08:45)      Method Type: VIDA      -  Ampicillin: R >8 Predicts results to ampicillin/sulbactam, amoxacillin-clavulanate and  piperacillin-tazobactam.      -  Ciprofloxacin: R >2      -  Levofloxacin: R >4      -  Nitrofurantoin: S <=32 Should not be used to treat pyelonephritis.      -  Tetracycline: R >8      -  Vancomycin: S 0.5    Culture - INF Candida auris (collected 02-19-25 @ 18:46)  Source: Nares/Axilla/Groin Nares/Axilla/Groin  Final Report (02-22-25 @ 21:59):    Culture POSITIVE for Candida auris, susceptibilities not performed for    this specimen type.    This surveillance culture is intended for Infection Control purposes only.    Culture - Sputum (collected 02-19-25 @ 15:54)  Source: Trach Asp Tracheal Aspirate  Gram Stain (02-19-25 @ 22:37):    Numerous polymorphonuclear leukocytes per low power field    Rare Squamous epithelial cells per low power field    Numerous Gram Negative Rods seen per oil power field    Few Gram Positive Rods seen per oil power field  Final Report (02-21-25 @ 21:20):    Few Pseudomonas aeruginosa (Carbapenem Resistant)    Commensal dominick consistent with body site  Organism: Pseudomonas aeruginosa (Carbapenem Resistant) (02-21-25 @ 21:20)  Organism: Pseudomonas aeruginosa (Carbapenem Resistant) (02-21-25 @ 21:20)      Method Type: CarbaR      -  Resistance Gene to Carbapenem: Nondet  Organism: Pseudomonas aeruginosa (Carbapenem Resistant) (02-21-25 @ 21:20)      Method Type: VIDA      -  Aztreonam: R >16      -  Cefepime: S 8      -  Ceftazidime: I 16      -  Ceftazidime/Avibactam: S <=4      -  Ceftolozane/tazobactam: S <=2      -  Ciprofloxacin: S <=0.25      -  Imipenem: R >8      -  Levofloxacin: S <=0.5      -  Meropenem: R >8      -  Piperacillin/Tazobactam: R 64    Culture - Urine (collected 02-18-25 @ 18:03)  Source: Clean Catch Clean Catch (Midstream)  Final Report (02-20-25 @ 15:06):    >100,000 CFU/ml Enterococcus faecium  Organism: Enterococcus faecium (02-20-25 @ 15:06)  Organism: Enterococcus faecium (02-20-25 @ 15:06)      Method Type: VIDA      -  Ampicillin: R >8 Predicts results to ampicillin/sulbactam, amoxacillin-clavulanate and  piperacillin-tazobactam.      -  Ciprofloxacin: R >2      -  Levofloxacin: R >4      -  Nitrofurantoin: S <=32 Should not be used to treat pyelonephritis.      -  Tetracycline: R >8      -  Vancomycin: S 0.5    Culture - Blood (collected 02-18-25 @ 16:05)  Source: .Blood Blood-Venous  Final Report (02-23-25 @ 22:01):    No growth at 5 days    Culture - Blood (collected 02-18-25 @ 16:00)  Source: .Blood Blood-Peripheral  Final Report (02-23-25 @ 22:01):    No growth at 5 days        Radiology: reviewed     Medications:  albuterol    0.083% 2.5 milliGRAM(s) Nebulizer every 6 hours  amLODIPine   Tablet 10 milliGRAM(s) Oral daily  apixaban 5 milliGRAM(s) Oral every 12 hours  AQUAPHOR (petrolatum Ointment) 1 Application(s) Topical two times a day  atorvastatin 20 milliGRAM(s) Oral at bedtime  chlorhexidine 0.12% Liquid 15 milliLiter(s) Oral Mucosa every 12 hours  chlorhexidine 2% Cloths 1 Application(s) Topical daily  dextrose 5%. 1000 milliLiter(s) IV Continuous <Continuous>  dextrose 5%. 1000 milliLiter(s) IV Continuous <Continuous>  dextrose 50% Injectable 25 Gram(s) IV Push once  dextrose 50% Injectable 12.5 Gram(s) IV Push once  dextrose 50% Injectable 25 Gram(s) IV Push once  dextrose Oral Gel 15 Gram(s) Oral once PRN  epoetin reilly-epbx (RETACRIT) Injectable 11617 Unit(s) IV Push <User Schedule>  ferrous    sulfate Liquid 300 milliGRAM(s) Enteral Tube daily  glucagon  Injectable 1 milliGRAM(s) IntraMuscular once  hydrALAZINE 50 milliGRAM(s) Oral three times a day  insulin lispro (ADMELOG) corrective regimen sliding scale   SubCutaneous every 6 hours  insulin NPH human recombinant 8 Unit(s) SubCutaneous <User Schedule>  insulin NPH human recombinant 6 Unit(s) SubCutaneous <User Schedule>  Nephro-noam 1 Tablet(s) Oral daily  pantoprazole   Suspension 40 milliGRAM(s) Oral before breakfast  piperacillin/tazobactam IVPB.. 3.375 Gram(s) IV Intermittent every 12 hours  sodium chloride 0.9% Bolus. 100 milliLiter(s) IV Bolus every 5 minutes PRN  sodium chloride 0.9% for Nebulization 3 milliLiter(s) Nebulizer every 6 hours    Antimicrobials:  piperacillin/tazobactam IVPB.. 3.375 Gram(s) IV Intermittent every 12 hours

## 2025-03-04 NOTE — CONSULT NOTE ADULT - ASSESSMENT
73 yo M with hx trach/vent (last changed 10/23/24), CVA x2 with residual R hemiplegia, COPD, ESRD on HD MWF (last 2/17), pressure ulcer presenting to [facility] ED for leukocytosis. Patient was intially up in the cath lab earlier today with vascular surgery for fistulogram and noted to have a WBC of 18 and sent to ED. Patient nonverbal at baseline, history provided by son. Per wife, patient at normal baseline, somewhat able to make needs known and is not complaining of any pain. . Endocrinology was consulted for management of diabetes mellitus type 2- pt on TF with hyperglycemia.     #Type 2 Diabetes Mellitus  - HbA1c 6.4  ; home regimen: pt on basal/bolus and on TF outpt- as per wife pt on humalog 2 units TID and flextouch pen (tresiba?) 6 units at bedtime when pt was home- pt currently from [employer]- please confirm what [employer] has been doing with regards to pt insulin regimen.  -egfr: 23    Plan:   - FS goal 100-180: FS above goal in 200's.  - Pt getting TF nephro carb stead bolus feeds of 290ml q 6 hours via PEG- pt was on NPH q 6 hours- will switch to basal/bolus plan starting at 12pm today- d/w ACP Shailesha.   - STOP NPH  - Recommend STARTING 12 units of lantus QHS tonight  - Recommend STARTING 4 units of Admelog q 6 hours (give when giving tube feeds). hold if holding TF- START at 12pm today.   - Recommend low Admelog correction scale q 6 hours - will monitor FS to see if need to increase to moderate scale  - Please check FSG before q6h while on TF and if NPO  - Please update endocrine if any changes to tube feeding - if no longer bolus or if rate changes.   - RD consult  - hypoglycemia orderset prn  - extensively discussed importance of glycemic control to prevent micro- and macrovascular complications  - discussed importance of weight loss, exercise, and changing diet   - reviewed symptoms and management of hypoglycemia  - reviewed basics of insulin administration and pharmacokinetics, glucometer monitoring, as well as glycemic goals for outpatient setting  - Discharge planning:    #Hypertension  - BP goal <130/80  -elevated BP  -Pt on norvasc and hydralazine  - Management as per primary team    #Hyperlipidemia  - check fasting lipid panel  -goal LDL <70  Plan:   - statin: on lipitor 20mg at bedtime via PEG    #ESRD on HD  renal following  monitor electrolytes  Corrected Ca today wnl 10.2  k-4.2    d/w RITA Au and attending Dr. Barajas.     Bhupendra Hollis, [facility]  Nurse Practitioner  Division of Endocrinology & Diabetes  contact on teams    If before 9AM or after 5PM, or on weekends/holidays, please call the Endocrine answering service for assistance ([phone]).  For nonurgent matters, please email [email or web address] for assistance.    73 yo M with hx trach/vent (last changed 10/23/24), CVA x2 with residual R hemiplegia, COPD, ESRD on HD MWF (last 2/17), pressure ulcer presenting to [facility] ED for leukocytosis. Patient was intially up in the cath lab earlier today with vascular surgery for fistulogram and noted to have a WBC of 18 and sent to ED. Patient nonverbal at baseline, history provided by son. Per wife, patient at normal baseline, somewhat able to make needs known and is not complaining of any pain. . Endocrinology was consulted for management of diabetes mellitus type 2- pt on TF with hyperglycemia.     #Type 2 Diabetes Mellitus  - HbA1c 6.4  ; home regimen: pt on basal/bolus and on TF outpt- as per wife pt on humalog 2 units TID and flextouch pen (tresiba?) 6 units at bedtime when pt was home- pt currently from [employer]- please confirm what [employer] has been doing with regards to pt insulin regimen.  -egfr: 23    Plan:   - FS goal 100-180: FS above goal in 200's.  - Pt getting TF nepro carb stead bolus feeds of 290ml q 6 hours via PEG- pt was on NPH q 6 hours- will switch to basal/bolus plan starting at 12pm today- d/w ACP Roe.   - STOP NPH  - Recommend STARTING 12 units of lantus QHS tonight  - Recommend STARTING 4 units of Admelog q 6 hours (give prior to giving tube feed bolus). hold if holding TF- START at 12pm today.   - Recommend low Admelog correction scale q 6 hours - will monitor FS to see if need to increase to moderate scale  - Please check FSG before q6h while on TF and if NPO  - Please update endocrine if any changes to tube feeding - if no longer bolus or if rate changes.   - RD consult  - hypoglycemia orderset prn  - extensively discussed importance of glycemic control to prevent micro- and macrovascular complications  - discussed importance of weight loss, exercise, and changing diet   - reviewed symptoms and management of hypoglycemia  - reviewed basics of insulin administration and pharmacokinetics, glucometer monitoring, as well as glycemic goals for outpatient setting  - Discharge planning:    #Hypertension  - BP goal <130/80  -elevated BP  -Pt on norvasc and hydralazine  - Management as per primary team    #Hyperlipidemia  - check fasting lipid panel  -goal LDL <70  Plan:   - statin: on lipitor 20mg at bedtime via PEG    #ESRD on HD  renal following  monitor electrolytes  Corrected Ca today wnl 10.2  k-4.2    d/w RITA Au and attending Dr. Barajas.     Bhupendra Hollis, [facility]  Nurse Practitioner  Division of Endocrinology & Diabetes  contact on teams    If before 9AM or after 5PM, or on weekends/holidays, please call the Endocrine answering service for assistance ([phone]).  For nonurgent matters, please email [email or web address] for assistance.    75 yo M with hx trach/vent (last changed 10/23/24), CVA x2 with residual R hemiplegia, COPD, ESRD on HD MWF (last 2/17), pressure ulcer presenting to [facility] ED for leukocytosis. Patient was intially up in the cath lab earlier today with vascular surgery for fistulogram and noted to have a WBC of 18 and sent to ED. Patient nonverbal at baseline, history provided by son. Per wife, patient at normal baseline, somewhat able to make needs known and is not complaining of any pain. . Endocrinology was consulted for management of diabetes mellitus type 2- pt on TF with hyperglycemia.     #Type 2 Diabetes Mellitus  - HbA1c 6.4  ; home regimen: pt on basal/bolus and on TF outpt- as per wife pt on humalog 2 units TID and flextouch pen (tresiba?) 6 units at bedtime when pt was home- pt currently from [employer]- please confirm what [employer] has been doing with regards to pt insulin regimen.  -egfr: 23    Plan:   - FS goal 100-180: FS above goal in 200's.  - Pt getting TF nepro carb stead bolus feeds of 290ml q 6 hours via PEG- pt was on NPH q 6 hours- will switch to basal/bolus plan starting at 12pm today- d/w ACP Roe.   - STOP NPH  - Recommend STARTING 12 units of lantus QHS tonight  - Recommend STARTING 4 units of Admelog q 6 hours (give prior to giving tube feed bolus). hold if holding TF- START at 12pm today.   - Recommend low Admelog correction scale q 6 hours - will monitor FS to see if need to increase to moderate scale  - Please check FSG before q6h while on TF and if NPO  - Please update endocrine if any changes to tube feeding - if no longer bolus or if rate changes.   - RD consult  - hypoglycemia orderset prn  - extensively discussed importance of glycemic control to prevent micro- and macrovascular complications  - discussed importance of weight loss, exercise, and changing diet   - reviewed symptoms and management of hypoglycemia  - reviewed basics of insulin administration and pharmacokinetics, glucometer monitoring, as well as glycemic goals for outpatient setting  - Discharge planning: If continues bolus feeds on discharge then pt will continue basal/bolus regimen doses TBD on dc.     #Hypertension  - BP goal <130/80  -elevated BP  -Pt on norvasc and hydralazine  - Management as per primary team    #Hyperlipidemia  - check fasting lipid panel  -goal LDL <70  Plan:   - statin: on lipitor 20mg at bedtime via PEG    #ESRD on HD  renal following  monitor electrolytes  Corrected Ca today wnl 10.2  k-4.2    d/w RITA Au and attending Dr. Barajas.     Bhupendra Hollis, [facility]  Nurse Practitioner  Division of Endocrinology & Diabetes  contact on teams    If before 9AM or after 5PM, or on weekends/holidays, please call the Endocrine answering service for assistance ([phone]).  For nonurgent matters, please email [email or web address] for assistance.

## 2025-03-04 NOTE — PROGRESS NOTE ADULT - SUBJECTIVE AND OBJECTIVE BOX
[facility]ACT[facility]   MD ELIANE BHAGAT MD MARIA SANTIAGO, NP    TEL:  OFFICE: [phone]  From 5pm-7am Answering Service [phone]    -- RENAL FOLLOW UP NOTE ---Date of Service 03-04-25 @ 13:30    Patient is a 74y old  Male who presents with a chief complaint of 2/18/25 was in cath lab earlier today with vascular surgery for fistulogram and noted to have a WBC of 18 and sent to ED and admitted       Patient seen and examined at bedside. Patient has trach to vent in no acute distress.    VITALS:  T(F): 97.2 (03-04-25 @ 04:00), Max: 101.8 (03-03-25 @ 15:40)  HR: 82 (03-04-25 @ 11:16)  BP: 161/62 (03-04-25 @ 04:00)  RR: 15 (03-04-25 @ 04:00)  SpO2: 100% (03-04-25 @ 11:16)  Wt(kg): --    03-03 @ 07:01  -  03-04 @ 07:00  --------------------------------------------------------  IN: 1830 mL / OUT: 2602 mL / NET: -772 mL          PHYSICAL EXAM:  General: NAD  Neck: No JVD  Respiratory: trach to vent; diminished  Cardiovascular: S1, S2, RRR  Gastrointestinal: BS+, soft, NT/ND  Extremities: No peripheral edema    Hospital Medications:   MEDICATIONS  (STANDING):  albuterol    0.083% 2.5 milliGRAM(s) Nebulizer every 6 hours  amLODIPine   Tablet 10 milliGRAM(s) Oral daily  apixaban 5 milliGRAM(s) Oral every 12 hours  AQUAPHOR (petrolatum Ointment) 1 Application(s) Topical two times a day  atorvastatin 20 milliGRAM(s) Oral at bedtime  chlorhexidine 0.12% Liquid 15 milliLiter(s) Oral Mucosa every 12 hours  chlorhexidine 2% Cloths 1 Application(s) Topical daily  dextrose 5%. 1000 milliLiter(s) (100 mL/Hr) IV Continuous <Continuous>  dextrose 5%. 1000 milliLiter(s) (50 mL/Hr) IV Continuous <Continuous>  dextrose 50% Injectable 25 Gram(s) IV Push once  dextrose 50% Injectable 12.5 Gram(s) IV Push once  dextrose 50% Injectable 25 Gram(s) IV Push once  epoetin reilly-epbx (RETACRIT) Injectable 74715 Unit(s) IV Push <User Schedule>  ferrous    sulfate Liquid 300 milliGRAM(s) Enteral Tube daily  glucagon  Injectable 1 milliGRAM(s) IntraMuscular once  hydrALAZINE 50 milliGRAM(s) Oral three times a day  insulin glargine Injectable (LANTUS) 12 Unit(s) SubCutaneous at bedtime  insulin lispro (ADMELOG) corrective regimen sliding scale   SubCutaneous every 6 hours  insulin lispro Injectable (ADMELOG) 4 Unit(s) SubCutaneous every 6 hours  Nephro-noam 1 Tablet(s) Oral daily  pantoprazole   Suspension 40 milliGRAM(s) Oral before breakfast  piperacillin/tazobactam IVPB.. 3.375 Gram(s) IV Intermittent every 12 hours  sodium chloride 0.9% for Nebulization 3 milliLiter(s) Nebulizer every 6 hours      LABS:  03-04    132[L]  |  90[L]  |  54[H]  ----------------------------<  257[H]  4.2   |  27  |  2.80[H]    Ca    9.4      04 Mar 2025 06:18  Phos  2.6     03-04  Mg     2.80     03-04    TPro      /  Alb  3.0[L]  /  TBili      /  DBili      /  AST      /  ALT      /  AlkPhos      03-04    Creatinine Trend: 2.80 <--, 2.24 <--, 3.49 <--, 2.98 <--, 2.74 <--, 2.04 <--, 3.10 <--, 2.03 <--, 2.59 <--    Phosphorus: 2.6 mg/dL (03-04 @ 06:18)  Albumin: 3.0 g/dL (03-04 @ 06:18)  Phosphorus: 1.7 mg/dL (03-03 @ 16:35)                              7.7    13.65 )-----------( 265      ( 03 Mar 2025 07:00 )             23.7     Urine Studies:  Urinalysis - [03-04-25 @ 06:18]      Color  / Appearance  / SG  / pH       Gluc 257 / Ketone   / Bili  / Urobili        Blood  / Protein  / Leuk Est  / Nitrite       RBC  / WBC  / Hyaline  / Gran  / Sq Epi  / Non Sq Epi  / Bacteria       Iron 46, TIBC 142, %sat 32      [02-19-25 @ 11:39]  Ferritin 3601      [02-19-25 @ 11:39]  PTH -- (Ca --)      [05-26-24 @ 01:20]   122  TSH 1.660      [10-05-24 @ 08:37]  Lipid: chol --, , HDL --, LDL --      [10-18-24 @ 06:00]    HBsAb 654.8      [02-19-25 @ 19:05]  HBsAg Nonreact      [02-19-25 @ 19:05]  HBcAb Nonreact      [02-19-25 @ 19:05]  HCV 0.19, Nonreact      [02-19-25 @ 19:05]      RADIOLOGY & ADDITIONAL STUDIES:

## 2025-03-04 NOTE — PROGRESS NOTE ADULT - ASSESSMENT
74-year-old male hx trach/vent - , CVA, COPD, stage renal disease on dialysis 4 times a week (MTRF) history of pressure ulcer.  Patient presents the ED today for elevated white count. nephrology consulted for dialysis needs    ESRD:  from [facility]  On HD MTTsF via an A-V fistula. s/p fistulogram by vascular 2/18  Continue MWF in hospital  consent from wife in dialysis unit  s/p hd 2/26 uf 2.5L, 2/28 uf 2L;   s/p hd 3/3 uf 2L  monitor bmp    Hypertension  suboptimal  continue norvasc 10 daily and hydralazine increased to 50 tid 3/2  titrate hydralazine to 100mg TID if needed  max uf as tolerated  monitor    Anemia:  Transfuse for Hg < 7.  s/p 1 unit PRBC 2/23  epo with hd  iron sat >20  monitor    leukocytosis  sepsis work up per team    hyponatremia  in setting of esrd and hyperglycemia  optimize dm control  hd per schedule with uF  monitor   74-year-old male hx trach/vent - , CVA, COPD, stage renal disease on dialysis 4 times a week (MTRF) history of pressure ulcer.  Patient presents the ED today for elevated white count. nephrology consulted for dialysis needs    ESRD:  from [facility]  On HD MTTsF via an A-V fistula. s/p fistulogram by vascular 2/18  Continue MWF in hospital  consent from wife in dialysis unit  s/p hd 2/26 uf 2.5L, 2/28 uf 2L;   s/p hd 3/3 uf 2L  HD sweta  monitor bmp    Hypertension  suboptimal  continue norvasc 10 daily and hydralazine increased to 50 tid 3/2  titrate hydralazine to 100mg TID if needed  max uf as tolerated  monitor    Anemia:  Transfuse for Hg < 7.  s/p 1 unit PRBC 2/23  epo with hd  iron sat >20  monitor    leukocytosis  sepsis work up per team    hyponatremia  in setting of esrd and hyperglycemia  optimize dm control  hd per schedule with uF  monitor

## 2025-03-04 NOTE — PROGRESS NOTE ADULT - ASSESSMENT
75 y/o M PMhx trach/vent (last changed 10/23/24), CVA x2 with residual R hemiplegia, COPD, ESRD on HD MWF who presented to ED for leukocytosis when initially planned for fistulogram and noted to have a WBC of 18.     fever  febrile 3/3  CXR 3/3 w/ near total L lung opacification  started on zosyn  repeat CXR this AM is much improved  ? mucous plugging    VAP- treated  SARS-CoV-2/ RSV/ flu PCR negative  MRSA PCR negative  CXR- Right lower lobe infiltrate  blood cultures- NGTD  resp cx w/  pseudomonas  s/p cefepime x 7 days, completed 2/25    UTI- treated  UA w/ significant pyuria though noted epithelial cells indicating contamination  unable to assess urinary symptoms given patient's mentation  repeat urine cx also w/ E faecium  s/p vanc course, completed 2/25    DVT  RUE US- age-indeterminate, non-occlusive deep vein thrombosis noted in the right brachial vein  on eliquis    ESRD  HD per nephrology    noted trach aspirate culture w/ GNR- suspect will be pseudomonas  patient w/ multiple strains of pseudomonas but based on bronch culture recommend  Recommendations  c/w zosyn for now  f/u resp culture  aspiration precautions  contact isolation for candida auris    [facility]  [phone]  After 5pm on weekdays and all day on weekends - please call [phone]  Available on [employer] teams    Thank you for consulting us and involving us in the management of this patients case. In addition to reviewing history, imaging, documents, labs, microbiology, took into account antibiotic stewardship, local antibiogram and infection control strategies and potential transmission issues.

## 2025-03-04 NOTE — PROGRESS NOTE ADULT - NS ATTEND AMEND GEN_ALL_CORE FT
agree with above  ID follow up appreciated, back on zosyn  argat changed to eliquis  endo appreciated  palliative care follow up

## 2025-03-04 NOTE — PROGRESS NOTE ADULT - ASSESSMENT
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 Patent recently admitted in 6/2024 for left pontine and cerebellar CVA requiring tracheostomy. While at [facility] patient decannulated and passed SS with advanced diet to easy to chew with thin liquids, but complicated COVID requiring transfer to [facility] in 7/2024 and then ultimately discharged home. Per wife patient was doing well at home until 10/2024 when he was found with RLL with concern for aspiration requiring intubation, then tracheostomy, and ultimately discharged to NH with vent dependence in 11/2024. Patient now presented for RUE fistulogram and angioplasty with vascular, however course complicated by leukocytosis with concern for recurrent trachitis vs PNA and UTI, AOCD and hyperglycemia. Admitted to RCU for further management. Found with  PSA trachitis/ PNA and enterococcus faecium UTI. Course complicated by RUE brachial DVT and hypoxia with HD with concern for volume down vs PE?      NEUROLOGY  # Poor mentation second to metabolic encephalopathy vs psychosomatic/ depression   - Hx of L cerebellar and pontene embolic CVA x 2 with residual R hemiplegia   - AOx0, bedbound, and nonverbal at baseline per report, however per exam patient able to open eyes, able to follow commands on left (LUE>LLE) with SEVERE weakness, however noted with R hemiplegia per baseline  - Nods yes and no occasionally however keeps eyes closed likely psychosomatic vs metabolic encephalopathy with infections?   - Last CTH in 10/2024 with no acute findings   - Hold [facility] CT for now   - Monitor mentation     CARDIOVASCULAR  # Hx of HTN and HLD  - Continue on hydral 25 and norvasc 10   - Monitor BP     # Incidental Pericardial effusion   - Incidental small pericardial effusion seen adjacent to right ventricle, however IVC appears flat and LE without edema with low concern for RV failure.   - Prior TTE reviewed from 4/2024 with normal RVLVSF with no pericardial effusion noted at the time.   - TTE 2/23 with EF 65, normal LVRVSF, mild LAD, normal RA, mild pulmonary hypertension, and small pericardial effusion noted adjacent to the anterior right ventricle with no echocardiographic evidence of tamponade  - Monitor hemodynamics     RESPIRATORY  # Respiratory failure second to PNA vs volume down vs PE?   - Hx of COPD with chronic respiratory failure with tracheostomy and vent dependence  - Admitted for angioplasty of RUE AVF, however course complicated by leukocytosis with concern for recurrent trachitis/ PNA.   - Course complicated by hypoxia during HD likely volume down vs migration of RUE brachial DVT with PE?   - Held volume removal, bolus given, and hypoxia improved.   - CTA CHEST without PE  - Continue on vent 15/400/50  - Hold PS for now  - Continue on nebs     HEENT   # Hemoptysis   - Wife reported hemoptysis while at nursing home 2 weeks prior to admission   - No hemoptysis noted on admission   - Hemoptysis returned in house   - CTA CHEST 2/24 without PE  - Bronch 2/26 with no evidence of bleeding   - Bleeding resolved     GI  # Dysphagia with PEG   - Passed SS with advanced diet to easy to chew with thin liquids in 7/2024, however since recurrent aspiration in 10/2024 now with PEG/ vent dependence   - Continue on PEG TF and changed to bolus feeds   - Monitor tolerance     RENAL  # ESRD on HD MTTHF   # AVF trouble  # Dehydration   - Presented for RUE fistulogram and angioplasty with vascular on 2/18   - Resumed on HD with no flow issue from AVF   - Course complicated by hypoxia with concern for volume down given small IVC and elevated lactate on 2/21  - Volume removal held, lactate improved, and hypoxia improved.   - Monitor volume status and continue on HD MTTF per renal   - Monitor renal function, electrolytes and UOP     # Hyponatremia likely volume down   - Concern for volume issues, however overall appears volume down with serum osmo 307  - Sodium 128 and improved with HD/ bolus during HD.   - Trend sodium with HD for now     # Elevated lactate   - Lactate elevated likely second to volume down?   - Post bolus lactate trending down from 3.3 to 2.7 to 1.3    INFECTIOUS DISEASE  # Hemoptysis   - Hemoptysis as above   - BAL with PSA as prior   - No fever and monitor off ABX     # Trachitis vs PNA/ UTI   - Hx of steno and PSA in sputum   - Flu/ COVID negative   - MRSA negative   - UCx with enterococcus   - SCx with  PSA   - Found with leukocytosis and started on cefepime and christiano (2/19) and vanco on HD days (2/21).   - No further steno seen in sputum and continue on cefepime (2/19 - 2/25) and vanco on HD days (2/21, 2/22 - 2/25)   - WBC increased likely reactive to hypoxic event and now improving  - ID following observe off abx     # PPX   - MRSA PCR negative  - Candida auris PCR POSITIVE  - Continue on isolation precautions per IC    HEME  # AOCD   - Presented for angioplasty and found with anemia to 6.7 s/p 1U PRBC 2/19 and 1/2 PRBC 2/23   - Anemia panel with concern for AOCD   - Continue on EPO QIW and Fe   - Monitor HH     # Heparin resistance?   - PTT persistently low despite increasing heparin GTT   - Resume on heparin GTT and anti Xa level supra therapeutic on but PTT remained low  - Patient ultimately with concern for heparin resistance    VASCULAR  # RUE DVT   - RUE edema noted with age-indeterminate, non-occlusive deep vein thrombosis noted in the right brachial vein.   - Case discussed with vascular who recommends full AC   - CTH from prior with encephalomalcia, however no hx of intracranial bleed   - Prior UGIB while on AC, however discharged on eliquis in 7/2024 and completed 6 month course for RLE DVT   - Continue on heparin GTT, however held for hemoptysis and resistance   - Continued on argatroban with good tolerance   - Change to eliquis today      ENDOCRINE  # DM2 A1C 14.0 (1/2024) > 6.4 (2/2025)  - Presented with hyperglycemia and continued on lantus and ISS   - Increase lantus to 28U QHS and ISS, however FS continues to trend in 200s   - Adjust insulin to NPH 16U Q6H (1200, 1800 and 0000) with tube feeds (2549-5162) and FS improved, however NPO and FS dropped   - NPH resumed at 6U Q6H with bolus feeds   - Monitor FS and adjust as needed    ETHICS/ GOC    - FULL CODE   - Wife wishes for palliative discussion   - Palliative meeting tomorrow at 10AM     DISPO - Back to NH when able   73 YO M with PMHx of COPD, 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 Patent recently admitted in 6/2024 for left pontine and cerebellar CVA requiring tracheostomy. While at [facility] patient decannulated and passed SS with advanced diet to easy to chew with thin liquids, but complicated COVID requiring transfer to [facility] in 7/2024 and then ultimately discharged home. Per wife patient was doing well at home until 10/2024 when he was found with RLL with concern for aspiration requiring intubation, then tracheostomy, and ultimately discharged to NH with vent dependence in 11/2024. Patient now presented for RUE fistulogram and angioplasty with vascular, however course complicated by leukocytosis with concern for recurrent trachitis vs PNA and UTI, AOCD and hyperglycemia. Admitted to RCU for further management. Found with  PSA trachitis/ PNA and enterococcus faecium UTI. Course complicated by RUE brachial DVT and hypoxia with HD with concern for volume down vs PE?      NEUROLOGY  # Poor mentation second to metabolic encephalopathy vs psychosomatic/ depression   - Hx of L cerebellar and pontene embolic CVA x 2 with residual R hemiplegia   - AOx0, bedbound, and nonverbal at baseline per report, however per exam patient able to open eyes, able to follow commands on left (LUE>LLE) with SEVERE weakness, however noted with R hemiplegia per baseline  - Nods yes and no occasionally however keeps eyes closed likely psychosomatic vs metabolic encephalopathy with infections?   - Last CTH in 10/2024 with no acute findings   - Hold [facility] CT for now   - Monitor mentation     CARDIOVASCULAR  # Hx of HTN and HLD  - Continue on hydral 25 and norvasc 10   - Monitor BP     # Incidental Pericardial effusion   - Incidental small pericardial effusion seen adjacent to right ventricle, however IVC appears flat and LE without edema with low concern for RV failure.   - Prior TTE reviewed from 4/2024 with normal RVLVSF with no pericardial effusion noted at the time.   - TTE 2/23 with EF 65, normal LVRVSF, mild LAD, normal RA, mild pulmonary hypertension, and small pericardial effusion noted adjacent to the anterior right ventricle with no echocardiographic evidence of tamponade  - Monitor hemodynamics     RESPIRATORY  # Respiratory failure second to PNA vs volume down vs PE?   - Hx of COPD with chronic respiratory failure with tracheostomy and vent dependence  - Admitted for angioplasty of RUE AVF, however course complicated by leukocytosis with concern for recurrent trachitis/ PNA.   - Course complicated by hypoxia during HD likely volume down vs migration of RUE brachial DVT with PE?   - Held volume removal, bolus given, and hypoxia improved.   - CTA CHEST without PE  - Continue on vent 15/400/50  - Hold PS for now  - Continue on nebs     HEENT   # Hemoptysis   - Wife reported hemoptysis while at nursing home 2 weeks prior to admission   - No hemoptysis noted on admission   - Hemoptysis returned in house   - CTA CHEST 2/24 without PE  - Bronch 2/26 with no evidence of bleeding   - Bleeding resolved     GI  # Dysphagia with PEG   - Passed SS with advanced diet to easy to chew with thin liquids in 7/2024, however since recurrent aspiration in 10/2024 now with PEG/ vent dependence   - Continue on PEG TF and changed to bolus feeds   - Monitor tolerance     RENAL  # ESRD on HD   # AVF trouble  # Dehydration   - Presented for RUE fistulogram and angioplasty with vascular on 2/18   - Resumed on HD with no flow issue from AVF   - Course complicated by hypoxia with concern for volume down given small IVC and elevated lactate on 2/21  - Volume removal held, lactate improved, and hypoxia improved.   - Monitor volume status and continue on HD MWF in house per renal   - Return to HD MTRF outpatient   - Monitor renal function, electrolytes and UOP     # Hyponatremia likely volume down   - Concern for volume issues, however overall appears volume down with serum osmo 307  - Sodium 128 and improved with HD/ bolus during HD.   - Trend sodium with HD for now     # Elevated lactate   - Lactate elevated likely second to volume down?   - Post bolus lactate trending down from 3.3 to 2.7 to 1.3    INFECTIOUS DISEASE  # Hemoptysis   - Hemoptysis as above   - BAL with PSA as prior   - No fever and monitor off ABX     # Trachitis vs PNA/ UTI   - Hx of steno and PSA in sputum   - Flu/ COVID negative   - MRSA negative   - UCx with enterococcus   - SCx with  PSA   - Found with leukocytosis and started on cefepime and christiano (2/19) and vanco on HD days (2/21).   - No further steno seen in sputum and continue on cefepime (2/19 - 2/25) and vanco on HD days (2/21, 2/22 - 2/25)   - WBC increased likely reactive to hypoxic event and now improving  - ID following observe off abx     # PPX   - MRSA PCR negative  - Candida auris PCR POSITIVE  - Continue on isolation precautions per IC    HEME  # AOCD   - Presented for angioplasty and found with anemia to 6.7 s/p 1U PRBC 2/19 and 1/2 PRBC 2/23   - Anemia panel with concern for AOCD   - Continue on EPO QIW and Fe   - Monitor HH     # Heparin resistance?   - PTT persistently low despite increasing heparin GTT   - Resume on heparin GTT and anti Xa level supra therapeutic on but PTT remained low  - Patient ultimately with concern for heparin resistance    VASCULAR  # RUE DVT   - RUE edema noted with age-indeterminate, non-occlusive deep vein thrombosis noted in the right brachial vein.   - Case discussed with vascular who recommends full AC   - CTH from prior with encephalomalcia, however no hx of intracranial bleed   - Prior UGIB while on AC, however discharged on eliquis in 7/2024 and completed 6 month course for RLE DVT   - Continue on heparin GTT, however held for hemoptysis and resistance   - Continued on argatroban with good tolerance   - Change to eliquis today      ENDOCRINE  # DM2 A1C 14.0 (1/2024) > 6.4 (2/2025)  - Presented with hyperglycemia and continued on lantus and ISS   - Increase lantus to 28U QHS and ISS, however FS continues to trend in 200s   - Adjust insulin to NPH 16U Q6H (1200, 1800 and 0000) with tube feeds (5941-2742) and FS improved, however NPO and FS dropped   - NPH resumed at 6U Q6H with bolus feeds   - Monitor FS and adjust as needed    ETHICS/ GOC    - FULL CODE   - Wife wishes for palliative discussion   - Palliative meeting tomorrow at 10AM     DISPO - Back to NH when able   73 YO M with PMHx of COPD, 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 Patent recently admitted in 6/2024 for left pontine and cerebellar CVA requiring tracheostomy. While at [facility] patient decannulated and passed SS with advanced diet to easy to chew with thin liquids, but complicated COVID requiring transfer to [facility] in 7/2024 and then ultimately discharged home. Per wife patient was doing well at home until 10/2024 when he was found with RLL with concern for aspiration requiring intubation, then tracheostomy, and ultimately discharged to NH with vent dependence in 11/2024. Patient now presented for RUE fistulogram and angioplasty with vascular, however course complicated by leukocytosis with concern for recurrent trachitis vs PNA and UTI, AOCD and hyperglycemia. Admitted to RCU for further management. Found with  PSA trachitis/ PNA and enterococcus faecium UTI. Course complicated by RUE brachial DVT and hypoxia with HD with concern for volume down vs PE?      NEUROLOGY  # Poor mentation second to metabolic encephalopathy vs psychosomatic/ depression   - Hx of L cerebellar and pontene embolic CVA x 2 with residual R hemiplegia   - AOx0, bedbound, and nonverbal at baseline per report, however per exam patient able to open eyes, able to follow commands on left (LUE>LLE) with SEVERE weakness, however noted with R hemiplegia per baseline  - Nods yes and no occasionally however keeps eyes closed likely psychosomatic vs metabolic encephalopathy with infections?   - Last CTH in 10/2024 with no acute findings   - Hold [facility] CT for now   - Monitor mentation     CARDIOVASCULAR  # Hx of HTN and HLD  - Continue on hydral 25 and norvasc 10   - Monitor BP     # Incidental Pericardial effusion   - Incidental small pericardial effusion seen adjacent to right ventricle, however IVC appears flat and LE without edema with low concern for RV failure.   - Prior TTE reviewed from 4/2024 with normal RVLVSF with no pericardial effusion noted at the time.   - TTE 2/23 with EF 65, normal LVRVSF, mild LAD, normal RA, mild pulmonary hypertension, and small pericardial effusion noted adjacent to the anterior right ventricle with no echocardiographic evidence of tamponade  - Monitor hemodynamics     RESPIRATORY  # Respiratory failure second to PNA vs volume down vs PE?   - Hx of COPD with chronic respiratory failure with tracheostomy and vent dependence  - Admitted for angioplasty of RUE AVF, however course complicated by leukocytosis with concern for recurrent trachitis/ PNA.   - Course complicated by hypoxia during HD likely volume down vs migration of RUE brachial DVT with PE?   - Held volume removal, bolus given, and hypoxia improved.   - CTA CHEST without PE  - Course complicated by LLL mucous plug and IPV started with improvement   - Continue on albuterol, NS 0.9 and IPV  - Continue on vent 15/400/6/40  - Hold PS for now    HEENT   # Hemoptysis   - Wife reported hemoptysis while at nursing home 2 weeks prior to admission   - No hemoptysis noted on admission   - Hemoptysis returned in house   - CTA CHEST 2/24 without PE  - Bronch 2/26 with no evidence of bleeding   - Bleeding resolved     GI  # Dysphagia with PEG   - Passed SS with advanced diet to easy to chew with thin liquids in 7/2024, however since recurrent aspiration in 10/2024 now with PEG/ vent dependence   - Continue on PEG TF and changed to bolus feeds   - Monitor tolerance     RENAL  # ESRD on HD   # AVF trouble  # Dehydration   - Presented for RUE fistulogram and angioplasty with vascular on 2/18   - Resumed on HD with no flow issue from AVF   - Course complicated by hypoxia with concern for volume down given small IVC and elevated lactate on 2/21  - Volume removal held, lactate improved, and hypoxia improved.   - Monitor volume status and continue on HD MWF in house per renal   - Return to HD MTRF outpatient   - Monitor renal function, electrolytes and UOP     # Hyponatremia likely volume down   - Concern for volume issues, however overall appears volume down with serum osmo 307  - Sodium 128 and improved with HD/ bolus during HD.   - Trend sodium with HD for now     # Elevated lactate   - Lactate elevated likely second to volume down?   - Post bolus lactate trending down from 3.3 to 2.7 to 1.3    INFECTIOUS DISEASE  # Recurrent LLL PNA   - CXR with LLL mucous plug   - POCUS with LLL consolidation vs atelectasis   - Recent BAL with PSA as below   - FULL RVP negative  - Fevers returned and zosyn (3/3 - ) started empirically pending RPT BCx and SCx     # Hemoptysis   - Hemoptysis as above   - BAL with PSA as prior   - No fever and monitor off ABX     # Trachitis vs PNA/ UTI   - Hx of steno and PSA in sputum   - Flu/ COVID negative   - MRSA negative   - UCx with enterococcus   - SCx with  PSA   - Found with leukocytosis and started on cefepime and christiano (2/19) and vanco on HD days (2/21).   - No further steno seen in sputum and continue on cefepime (2/19 - 2/25) and vanco on HD days (2/21, 2/22 - 2/25)   - WBC increased likely reactive to hypoxic event and now improving  - ID following observe off abx     # PPX   - MRSA PCR negative  - Candida auris PCR POSITIVE  - Continue on isolation precautions per IC    HEME  # AOCD   - Presented for angioplasty and found with anemia to 6.7 s/p 1U PRBC 2/19 and 1/2 PRBC 2/23   - Anemia panel with concern for AOCD   - Continue on EPO QIW and Fe   - Monitor HH     # Heparin resistance?   - PTT persistently low despite increasing heparin GTT   - Resume on heparin GTT and anti Xa level supra therapeutic on but PTT remained low  - Patient ultimately with concern for heparin resistance    VASCULAR  # RUE DVT   - RUE edema noted with age-indeterminate, non-occlusive deep vein thrombosis noted in the right brachial vein.   - Case discussed with vascular who recommends full AC   - CTH from prior with encephalomalcia, however no hx of intracranial bleed   - Prior UGIB while on AC, however discharged on eliquis in 7/2024 and completed 6 month course for RLE DVT   - Continue on heparin GTT, however held for hemoptysis and resistance   - Continued on argatroban with good tolerance   - Change to eliquis 3/3     ENDOCRINE  # DM2 A1C 14.0 (1/2024) > 6.4 (2/2025)  - Presented with hyperglycemia and continued on lantus and ISS   - Increased lantus, however FS continues to trend in 200s and changed to NPH with improvement.  - TF interrupted for bronchoscopy and adjustment to bolus feeds and FS dropped.   - NPH stopped and FS improved, however trended back up with continuation of tube feeds.   - NPH resumed, however FS continues to waxe and wane and case discussed with endocrine   - Insulin changed back to lantus 12 and lispro 4 with ISS for now   - Monitor FS and adjust as needed  - Endocrine following     ETHICS/ GOC    - FULL CODE   - Wife wishes for palliative discussion   - Palliative meeting today      DISPO - Back to NH when able

## 2025-03-04 NOTE — PROGRESS NOTE ADULT - SUBJECTIVE AND OBJECTIVE BOX
RCU PROGRESS NOTE     CHIEF COMPLAINT: Patient is a 74y old  Male who presents with a chief complaint of leukocytosis (20 Feb 2025 13:41)      INTERVAL EVENTS:  - No interval events overnight  - Fevers with worsened left side opacity and now on zosyn   - FS running slightly high and NPH increased   - Palliative meeting with wife today     REVIEW OF SYSTEMS: Seen by bedside during AM rounds and unable to assess ROS second to vented     Mode: AC/ CMV (Assist Control/ Continuous Mandatory Ventilation), RR (machine): 15, TV (machine): 400, FiO2: 40, PEEP: 5, ITime: 0.72, MAP: 9, PIP: 26      OBJECTIVE:  ICU Vital Signs Last 24 Hrs  T(C): 36.8 (03 Mar 2025 06:45), Max: 37.1 (02 Mar 2025 16:00)  T(F): 98.3 (03 Mar 2025 06:45), Max: 98.7 (02 Mar 2025 16:00)  HR: 75 (03 Mar 2025 07:56) (72 - 90)  BP: 175/62 (03 Mar 2025 06:45) (153/60 - 202/73)  BP(mean): 97 (03 Mar 2025 06:00) (87 - 107)  ABP: --  ABP(mean): --  RR: 18 (03 Mar 2025 06:45) (18 - 19)  SpO2: 100% (03 Mar 2025 07:56) (100% - 100%)    O2 Parameters below as of 03 Mar 2025 07:56  Patient On (Oxygen Delivery Method): ventilator          Mode: AC/ CMV (Assist Control/ Continuous Mandatory Ventilation), RR (machine): 15, TV (machine): 400, FiO2: 40, PEEP: 5, ITime: 0.72, MAP: 9, PIP: 26    03-02 @ 07:01  -  03-03 @ 07:00  --------------------------------------------------------  IN: 1260 mL / OUT: 0 mL / NET: 1260 mL      CAPILLARY BLOOD GLUCOSE  POCT Blood Glucose.: 195 mg/dL (03 Mar 2025 05:11)      HOSPITAL MEDICATIONS:  MEDICATIONS  (STANDING):  albuterol/ipratropium for Nebulization 3 milliLiter(s) Nebulizer every 6 hours  amLODIPine   Tablet 10 milliGRAM(s) Oral daily  AQUAPHOR (petrolatum Ointment) 1 Application(s) Topical two times a day  argatroban Infusion 1.75 MICROgram(s)/kG/Min (5.65 mL/Hr) IV Continuous <Continuous>  atorvastatin 20 milliGRAM(s) Oral at bedtime  chlorhexidine 0.12% Liquid 15 milliLiter(s) Oral Mucosa every 12 hours  chlorhexidine 2% Cloths 1 Application(s) Topical daily  dextrose 5%. 1000 milliLiter(s) (100 mL/Hr) IV Continuous <Continuous>  dextrose 5%. 1000 milliLiter(s) (50 mL/Hr) IV Continuous <Continuous>  dextrose 50% Injectable 25 Gram(s) IV Push once  dextrose 50% Injectable 12.5 Gram(s) IV Push once  dextrose 50% Injectable 25 Gram(s) IV Push once  epoetin reilly-epbx (RETACRIT) Injectable 35983 Unit(s) IV Push <User Schedule>  ferrous    sulfate Liquid 300 milliGRAM(s) Enteral Tube daily  glucagon  Injectable 1 milliGRAM(s) IntraMuscular once  hydrALAZINE 50 milliGRAM(s) Oral three times a day  insulin lispro (ADMELOG) corrective regimen sliding scale   SubCutaneous every 6 hours  insulin NPH human recombinant 4 Unit(s) SubCutaneous every 6 hours  Nephro-noam 1 Tablet(s) Oral daily  pantoprazole   Suspension 40 milliGRAM(s) Oral before breakfast    MEDICATIONS  (PRN):  dextrose Oral Gel 15 Gram(s) Oral once PRN Blood Glucose LESS THAN 70 milliGRAM(s)/deciliter  sodium chloride 0.9% Bolus. 100 milliLiter(s) IV Bolus every 5 minutes PRN SBP LESS THAN or EQUAL to 80 mmHg      PHYSICAL EXAMINATION  General: NAD   HEENT: Trach present   Cards: S1/S2, no murmurs   Pulm: Course vent sounds bilaterally. No wheezes.   Abdomen: Soft, nondistended and nontender. PEG present  Extremities: No pedal edema. No active THAI of BL upper and lower extremities/ refusal to cooperate  Neurology: Awakens and opens eyes, able to nod yes and no occasionally however refuses to cooperate with no acute focal neurological deficits     LABS:                       PAST MEDICAL & SURGICAL HISTORY:  ESRD on hemodialysis      HTN (hypertension)      Insulin dependent type 2 diabetes mellitus      History of cerebrovascular accident (CVA) with residual deficit      History of DVT of lower extremity      HLD (hyperlipidemia)      CVA (cerebrovascular accident)      Aphasia      Tracheostomy in place      PEG (percutaneous endoscopic gastrostomy) status      GERD (gastroesophageal reflux disease)      Constipation      Pressure ulcer      Arteriovenous fistula      S/P percutaneous endoscopic gastrostomy (PEG) tube placement      History of tracheostomy          FAMILY HISTORY:  FHx: diabetes mellitus (Father, Aunt)        Social History:      RADIOLOGY:  [ ] Reviewed and interpreted by me    PULMONARY FUNCTION TESTS:    EKG: RCU PROGRESS NOTE     CHIEF COMPLAINT: Patient is a 74y old  Male who presents with a chief complaint of leukocytosis (20 Feb 2025 13:41)      INTERVAL EVENTS:  - No interval events overnight  - Fevers with worsened left side opacity and now on zosyn   - FS running slightly high and endocrine adjusting insulin  - Palliative meeting with wife today    REVIEW OF SYSTEMS: Seen by bedside during AM rounds and unable to assess ROS second to vented     Mode: AC/ CMV (Assist Control/ Continuous Mandatory Ventilation), RR (machine): 15, TV (machine): 400, FiO2: 40, PEEP: 5, ITime: 0.72, MAP: 9, PIP: 26      OBJECTIVE:  ICU Vital Signs Last 24 Hrs  T(C): 36.8 (03 Mar 2025 06:45), Max: 37.1 (02 Mar 2025 16:00)  T(F): 98.3 (03 Mar 2025 06:45), Max: 98.7 (02 Mar 2025 16:00)  HR: 75 (03 Mar 2025 07:56) (72 - 90)  BP: 175/62 (03 Mar 2025 06:45) (153/60 - 202/73)  BP(mean): 97 (03 Mar 2025 06:00) (87 - 107)  ABP: --  ABP(mean): --  RR: 18 (03 Mar 2025 06:45) (18 - 19)  SpO2: 100% (03 Mar 2025 07:56) (100% - 100%)    O2 Parameters below as of 03 Mar 2025 07:56  Patient On (Oxygen Delivery Method): ventilator          Mode: AC/ CMV (Assist Control/ Continuous Mandatory Ventilation), RR (machine): 15, TV (machine): 400, FiO2: 40, PEEP: 5, ITime: 0.72, MAP: 9, PIP: 26    03-02 @ 07:01  -  03-03 @ 07:00  --------------------------------------------------------  IN: 1260 mL / OUT: 0 mL / NET: 1260 mL      CAPILLARY BLOOD GLUCOSE  POCT Blood Glucose.: 195 mg/dL (03 Mar 2025 05:11)      HOSPITAL MEDICATIONS:  MEDICATIONS  (STANDING):  albuterol/ipratropium for Nebulization 3 milliLiter(s) Nebulizer every 6 hours  amLODIPine   Tablet 10 milliGRAM(s) Oral daily  AQUAPHOR (petrolatum Ointment) 1 Application(s) Topical two times a day  argatroban Infusion 1.75 MICROgram(s)/kG/Min (5.65 mL/Hr) IV Continuous <Continuous>  atorvastatin 20 milliGRAM(s) Oral at bedtime  chlorhexidine 0.12% Liquid 15 milliLiter(s) Oral Mucosa every 12 hours  chlorhexidine 2% Cloths 1 Application(s) Topical daily  dextrose 5%. 1000 milliLiter(s) (100 mL/Hr) IV Continuous <Continuous>  dextrose 5%. 1000 milliLiter(s) (50 mL/Hr) IV Continuous <Continuous>  dextrose 50% Injectable 25 Gram(s) IV Push once  dextrose 50% Injectable 12.5 Gram(s) IV Push once  dextrose 50% Injectable 25 Gram(s) IV Push once  epoetin reilly-epbx (RETACRIT) Injectable 14909 Unit(s) IV Push <User Schedule>  ferrous    sulfate Liquid 300 milliGRAM(s) Enteral Tube daily  glucagon  Injectable 1 milliGRAM(s) IntraMuscular once  hydrALAZINE 50 milliGRAM(s) Oral three times a day  insulin lispro (ADMELOG) corrective regimen sliding scale   SubCutaneous every 6 hours  insulin NPH human recombinant 4 Unit(s) SubCutaneous every 6 hours  Nephro-noam 1 Tablet(s) Oral daily  pantoprazole   Suspension 40 milliGRAM(s) Oral before breakfast    MEDICATIONS  (PRN):  dextrose Oral Gel 15 Gram(s) Oral once PRN Blood Glucose LESS THAN 70 milliGRAM(s)/deciliter  sodium chloride 0.9% Bolus. 100 milliLiter(s) IV Bolus every 5 minutes PRN SBP LESS THAN or EQUAL to 80 mmHg      PHYSICAL EXAMINATION  General: NAD   HEENT: Trach present   Cards: S1/S2, no murmurs   Pulm: Course vent sounds bilaterally. No wheezes.   Abdomen: Soft, nondistended and nontender. PEG present  Extremities: No pedal edema. No active THAI of BL upper and lower extremities/ refusal to cooperate  Neurology: Awakens and opens eyes, able to nod yes and no occasionally however refuses to cooperate with no acute focal neurological deficits     LABS:                       PAST MEDICAL & SURGICAL HISTORY:  ESRD on hemodialysis      HTN (hypertension)      Insulin dependent type 2 diabetes mellitus      History of cerebrovascular accident (CVA) with residual deficit      History of DVT of lower extremity      HLD (hyperlipidemia)      CVA (cerebrovascular accident)      Aphasia      Tracheostomy in place      PEG (percutaneous endoscopic gastrostomy) status      GERD (gastroesophageal reflux disease)      Constipation      Pressure ulcer      Arteriovenous fistula      S/P percutaneous endoscopic gastrostomy (PEG) tube placement      History of tracheostomy          FAMILY HISTORY:  FHx: diabetes mellitus (Father, Aunt)        Social History:      RADIOLOGY:  [ ] Reviewed and interpreted by me    PULMONARY FUNCTION TESTS:    EKG:

## 2025-03-04 NOTE — CHART NOTE - NSCHARTNOTEFT_GEN_A_CORE
RCU PA POCUS NOTE     : ROBBIN Rhodes     INDICATION: Effusion vs atelectasis vs PNA on CXR    PROCEDURE:  [ ] LIMITED ECHO  [ x] LIMITED CHEST  [ ] LIMITED RETROPERITONEAL  [ ] LIMITED ABDOMINAL  [ ] LIMITED DVT  [ ] NEEDLE GUIDANCE VASCULAR  [ ] NEEDLE GUIDANCE THORACENTESIS  [ ] NEEDLE GUIDANCE PARACENTESIS  [ ] NEEDLE GUIDANCE PERICARDIOCENTESIS  [ ] OTHER    FINDINGS:  LLL atelectasis vs consolidation   Images uploaded to [employer]     INTERPRETATION:  LLL atelectasis vs consolidation     JANET Mercedes, PAKarinaC  Department of Medicine/ RCU  In house RCU [facility] [id number]  In house Medicine [facility] [id number]  Reachable via teams

## 2025-03-04 NOTE — CONSULT NOTE ADULT - REASON FOR ADMISSION
leukocytosis
2/18/25 was in cath lab earlier today with vascular surgery for fistulogram and noted to have a WBC of 18 and sent to ED and admitted

## 2025-03-04 NOTE — CONSULT NOTE ADULT - SUBJECTIVE AND OBJECTIVE BOX
HPI:  75 yo M with hx trach/vent (last changed 10/23/24), CVA x2 with residual R hemiplegia, COPD, ESRD on HD MWF (last 2/17), pressure ulcer presenting to [facility] ED for leukocytosis. Patient was intially up in the cath lab earlier today with vascular surgery for fistulogram and noted to have a WBC of 18 and sent to ED. Patient nonverbal at baseline, history provided by son. Per wife, patient at normal baseline, somewhat able to make needs known and is not complaining of any pain.      ED course:Initial vitals T 37.1, HR 74, /68, SpO2 100%. Labs notable for WBC 18, RBC 7.3 (baseline 7.2), , Na 132, K 3.2, bicarb 30, Cr 3.08 (baseline ~3), Alk phos 260, normal LFTs, VBG with pH 7.34, pCO2 65, UA with large leuk esterase, moderate blood, > 6k WBCs, and many bacteria, RVP negative.     Patient to be admitted to RCU as he is trach/vented in setting of leukocytosis 2/2 likely UTI with plan for further evaluation/management.   (18 Feb 2025 18:00)      Endocrinology HPI- history obtained over the phone from pt wife Pauline [phone]    Diabetes Mellitus Type 2  Diagnosis: when he was 56 years old  Symptoms: none at home  Outpatient endocrinologist: Daniel Story   Last HgbA1c: 6.4 (A1c in jan 2024 was 14)  Outpatient regimen: pt on basal/bolus and on TF outpt- as per wife pt on humalog 2 units TID and flextouch pen (tresiba?) 6 units at bedtime when pt was home- pt currently from [facility]- please confirm what [facility] [employer] has been doing with regards to pt insulin regimen.  Compliance: compliance with insulin and checking FS when pt was home  Blood sugars at home: 100-200- wife said rarely over 200's. Said on HD days after HD he would normally run higher.   Inpatient regimen: pt was getting NPH q 6 hours and low correctional scale q 6 hours- pt on bolus feeding now - therefore will switch to basal/bolus since on bolus TF  FH: none  Tobacco, etoh, drug use: denies  Diet: on TF at home  Exercise: none  History of CAD/MI/Stroke: hx of CVA    Review of Systems:  unable to assess- pt not participating     PHYSICAL EXAM:  VITALS: T(C): 36.2 (03-04-25 @ 04:00)  T(F): 97.2 (03-04-25 @ 04:00), Max: 101.8 (03-03-25 @ 15:40)  HR: 82 (03-04-25 @ 11:16) (73 - 101)  BP: 161/62 (03-04-25 @ 04:00) (142/65 - 161/62)  RR:  (10 - 19)  SpO2:  (100% - 100%)  Wt(kg): --  GENERAL: NAD  EYES: No proptosis  RESPIRATORY: non labored breathing- trach/ventilator  CARDIOVASCULAR: Regular rate and rhythm  GI: PEG on TF- tolerating bolus feeds    CAPILLARY BLOOD GLUCOSE      POCT Blood Glucose.: 289 mg/dL (04 Mar 2025 11:20)  POCT Blood Glucose.: 299 mg/dL (04 Mar 2025 05:49)  POCT Blood Glucose.: 278 mg/dL (03 Mar 2025 23:23)  POCT Blood Glucose.: 158 mg/dL (03 Mar 2025 17:29)      MEDICATIONS  (STANDING):  albuterol    0.083% 2.5 milliGRAM(s) Nebulizer every 6 hours  amLODIPine   Tablet 10 milliGRAM(s) Oral daily  apixaban 5 milliGRAM(s) Oral every 12 hours  AQUAPHOR (petrolatum Ointment) 1 Application(s) Topical two times a day  atorvastatin 20 milliGRAM(s) Oral at bedtime  chlorhexidine 0.12% Liquid 15 milliLiter(s) Oral Mucosa every 12 hours  chlorhexidine 2% Cloths 1 Application(s) Topical daily  dextrose 5%. 1000 milliLiter(s) (100 mL/Hr) IV Continuous <Continuous>  dextrose 5%. 1000 milliLiter(s) (50 mL/Hr) IV Continuous <Continuous>  dextrose 50% Injectable 25 Gram(s) IV Push once  dextrose 50% Injectable 12.5 Gram(s) IV Push once  dextrose 50% Injectable 25 Gram(s) IV Push once  epoetin reilly-epbx (RETACRIT) Injectable 88567 Unit(s) IV Push <User Schedule>  ferrous    sulfate Liquid 300 milliGRAM(s) Enteral Tube daily  glucagon  Injectable 1 milliGRAM(s) IntraMuscular once  hydrALAZINE 50 milliGRAM(s) Oral three times a day  insulin glargine Injectable (LANTUS) 12 Unit(s) SubCutaneous at bedtime  insulin lispro (ADMELOG) corrective regimen sliding scale   SubCutaneous every 6 hours  insulin lispro Injectable (ADMELOG) 4 Unit(s) SubCutaneous every 6 hours  Nephro-noam 1 Tablet(s) Oral daily  pantoprazole   Suspension 40 milliGRAM(s) Oral before breakfast  piperacillin/tazobactam IVPB.. 3.375 Gram(s) IV Intermittent every 12 hours  sodium chloride 0.9% for Nebulization 3 milliLiter(s) Nebulizer every 6 hours                                7.7    13.65 )-----------( 265      ( 03 Mar 2025 07:00 )             23.7       03-04    132[L]  |  90[L]  |  54[H]  ----------------------------<  257[H]  4.2   |  27  |  2.80[H]    eGFR: 23[L]    Ca    9.4      03-04  Mg     2.80     03-04  Phos  2.6     03-04    TPro  x   /  Alb  3.0[L]  /  TBili  x   /  DBili  x   /  AST  x   /  ALT  x   /  AlkPhos  x   03-04      Diet, NPO with Tube Feed:   Tube Feeding Modality: Gastrostomy  Nepro with Carb Steady (NEPRORTH)  Total Volume for 24 Hours (mL): 1160  Bolus  Total Volume of Bolus (mL):  290  Total # of Feeds: 4  Tube Feed Frequency: Every 6 hours   Tube Feed Start Time: 12:00  Bolus Feed Rate (mL per Hour): 290   Bolus Feed Duration (in Hours): 0  Liquid Protein Supplement     Qty per Day:  1 (02-25-25 @ 13:43) [Active]                           HPI:  73 yo M with hx trach/vent (last changed 10/23/24), CVA x2 with residual R hemiplegia, COPD, ESRD on HD MWF (last 2/17), pressure ulcer presenting to [facility] ED for leukocytosis. Patient was intially up in the cath lab earlier today with vascular surgery for fistulogram and noted to have a WBC of 18 and sent to ED. Patient nonverbal at baseline, history provided by son. Per wife, patient at normal baseline, somewhat able to make needs known and is not complaining of any pain.      ED course:Initial vitals T 37.1, HR 74, /68, SpO2 100%. Labs notable for WBC 18, RBC 7.3 (baseline 7.2), , Na 132, K 3.2, bicarb 30, Cr 3.08 (baseline ~3), Alk phos 260, normal LFTs, VBG with pH 7.34, pCO2 65, UA with large leuk esterase, moderate blood, > 6k WBCs, and many bacteria, RVP negative.     Patient to be admitted to RCU as he is trach/vented in setting of leukocytosis 2/2 likely UTI with plan for further evaluation/management.   (18 Feb 2025 18:00)      Endocrinology HPI- history obtained over the phone from pt wife Pauline [phone]    Diabetes Mellitus Type 2  Diagnosis: when he was 56 years old  Symptoms: none at home  Outpatient endocrinologist: Daniel Story   Last HgbA1c: 6.4 (A1c in jan 2024 was 14)  Outpatient regimen: pt on basal/bolus and on bolus TF outpt- as per wife pt on humalog 2 units TID and flextouch pen (tresiba?) 6 units at bedtime when pt was home- pt currently from [facility]- please confirm what [facility] has been doing with regards to pt insulin regimen.  Compliance: compliance with insulin and checking FS when pt was home  Blood sugars at home: 100-200- wife said rarely over 200's. Said on HD days after HD he would normally run higher.   Inpatient regimen: pt was getting NPH q 6 hours and low correctional scale q 6 hours- pt on bolus feeding now - therefore will switch to basal/bolus since on bolus TF  FH: none  Tobacco, etoh, drug use: denies  Diet: on TF at home  Exercise: none  History of CAD/MI/Stroke: hx of CVA    Review of Systems:  unable to assess- pt not participating     PHYSICAL EXAM:  VITALS: T(C): 36.2 (03-04-25 @ 04:00)  T(F): 97.2 (03-04-25 @ 04:00), Max: 101.8 (03-03-25 @ 15:40)  HR: 82 (03-04-25 @ 11:16) (73 - 101)  BP: 161/62 (03-04-25 @ 04:00) (142/65 - 161/62)  RR:  (10 - 19)  SpO2:  (100% - 100%)  Wt(kg): --  GENERAL: NAD  EYES: No proptosis  RESPIRATORY: non labored breathing- trach/ventilator  CARDIOVASCULAR: Regular rate and rhythm  GI: PEG on TF- tolerating bolus feeds    CAPILLARY BLOOD GLUCOSE      POCT Blood Glucose.: 289 mg/dL (04 Mar 2025 11:20)  POCT Blood Glucose.: 299 mg/dL (04 Mar 2025 05:49)  POCT Blood Glucose.: 278 mg/dL (03 Mar 2025 23:23)  POCT Blood Glucose.: 158 mg/dL (03 Mar 2025 17:29)      MEDICATIONS  (STANDING):  albuterol    0.083% 2.5 milliGRAM(s) Nebulizer every 6 hours  amLODIPine   Tablet 10 milliGRAM(s) Oral daily  apixaban 5 milliGRAM(s) Oral every 12 hours  AQUAPHOR (petrolatum Ointment) 1 Application(s) Topical two times a day  atorvastatin 20 milliGRAM(s) Oral at bedtime  chlorhexidine 0.12% Liquid 15 milliLiter(s) Oral Mucosa every 12 hours  chlorhexidine 2% Cloths 1 Application(s) Topical daily  dextrose 5%. 1000 milliLiter(s) (100 mL/Hr) IV Continuous <Continuous>  dextrose 5%. 1000 milliLiter(s) (50 mL/Hr) IV Continuous <Continuous>  dextrose 50% Injectable 25 Gram(s) IV Push once  dextrose 50% Injectable 12.5 Gram(s) IV Push once  dextrose 50% Injectable 25 Gram(s) IV Push once  epoetin reilly-epbx (RETACRIT) Injectable 74599 Unit(s) IV Push <User Schedule>  ferrous    sulfate Liquid 300 milliGRAM(s) Enteral Tube daily  glucagon  Injectable 1 milliGRAM(s) IntraMuscular once  hydrALAZINE 50 milliGRAM(s) Oral three times a day  insulin glargine Injectable (LANTUS) 12 Unit(s) SubCutaneous at bedtime  insulin lispro (ADMELOG) corrective regimen sliding scale   SubCutaneous every 6 hours  insulin lispro Injectable (ADMELOG) 4 Unit(s) SubCutaneous every 6 hours  Nephro-noam 1 Tablet(s) Oral daily  pantoprazole   Suspension 40 milliGRAM(s) Oral before breakfast  piperacillin/tazobactam IVPB.. 3.375 Gram(s) IV Intermittent every 12 hours  sodium chloride 0.9% for Nebulization 3 milliLiter(s) Nebulizer every 6 hours                                7.7    13.65 )-----------( 265      ( 03 Mar 2025 07:00 )             23.7       03-04    132[L]  |  90[L]  |  54[H]  ----------------------------<  257[H]  4.2   |  27  |  2.80[H]    eGFR: 23[L]    Ca    9.4      03-04  Mg     2.80     03-04  Phos  2.6     03-04    TPro  x   /  Alb  3.0[L]  /  TBili  x   /  DBili  x   /  AST  x   /  ALT  x   /  AlkPhos  x   03-04      Diet, NPO with Tube Feed:   Tube Feeding Modality: Gastrostomy  Nepro with Carb Steady (NEPRORTH)  Total Volume for 24 Hours (mL): 1160  Bolus  Total Volume of Bolus (mL):  290  Total # of Feeds: 4  Tube Feed Frequency: Every 6 hours   Tube Feed Start Time: 12:00  Bolus Feed Rate (mL per Hour): 290   Bolus Feed Duration (in Hours): 0  Liquid Protein Supplement     Qty per Day:  1 (02-25-25 @ 13:43) [Active]

## 2025-03-05 LAB
-  AZTREONAM: SIGNIFICANT CHANGE UP
-  CEFEPIME: SIGNIFICANT CHANGE UP
-  CEFTAZIDIME/AVIBACTAM: SIGNIFICANT CHANGE UP
-  CEFTAZIDIME: SIGNIFICANT CHANGE UP
-  CEFTOLOZANE/TAZOBACTAM: SIGNIFICANT CHANGE UP
-  CIPROFLOXACIN: SIGNIFICANT CHANGE UP
-  IMIPENEM: SIGNIFICANT CHANGE UP
-  LEVOFLOXACIN: SIGNIFICANT CHANGE UP
-  MEROPENEM: SIGNIFICANT CHANGE UP
-  PIPERACILLIN/TAZOBACTAM: SIGNIFICANT CHANGE UP
ANION GAP SERPL CALC-SCNC: 15 MMOL/L — HIGH (ref 7–14)
BASOPHILS # BLD AUTO: 0.06 K/UL — SIGNIFICANT CHANGE UP (ref 0–0.2)
BLANDM BLD POS QL PROBE: SIGNIFICANT CHANGE UP
BUN SERPL-MCNC: 70 MG/DL — HIGH (ref 7–23)
CALCIUM SERPL-MCNC: 9.3 MG/DL — SIGNIFICANT CHANGE UP (ref 8.4–10.5)
CO2 SERPL-SCNC: 25 MMOL/L — SIGNIFICANT CHANGE UP (ref 22–31)
CREAT SERPL-MCNC: 3.47 MG/DL — HIGH (ref 0.5–1.3)
EGFR: 18 ML/MIN/1.73M2 — LOW
EOSINOPHIL # BLD AUTO: 0.35 K/UL — SIGNIFICANT CHANGE UP (ref 0–0.5)
EOSINOPHIL NFR BLD AUTO: 2.1 % — SIGNIFICANT CHANGE UP (ref 0–6)
GLUCOSE BLDC GLUCOMTR-MCNC: 153 MG/DL — HIGH (ref 70–99)
GLUCOSE BLDC GLUCOMTR-MCNC: 159 MG/DL — HIGH (ref 70–99)
GLUCOSE BLDC GLUCOMTR-MCNC: 174 MG/DL — HIGH (ref 70–99)
GLUCOSE BLDC GLUCOMTR-MCNC: 202 MG/DL — HIGH (ref 70–99)
GLUCOSE SERPL-MCNC: 202 MG/DL — HIGH (ref 70–99)
HCT VFR BLD CALC: 25.8 % — LOW (ref 39–50)
HGB BLD-MCNC: 8.1 G/DL — LOW (ref 13–17)
IANC: 14.37 K/UL — HIGH (ref 1.8–7.4)
IMM GRANULOCYTES NFR BLD AUTO: 0.9 % — SIGNIFICANT CHANGE UP (ref 0–0.9)
LYMPHOCYTES # BLD AUTO: 0.61 K/UL — LOW (ref 1–3.3)
LYMPHOCYTES # BLD AUTO: 3.7 % — LOW (ref 13–44)
MAGNESIUM SERPL-MCNC: 2.8 MG/DL — HIGH (ref 1.6–2.6)
MCHC RBC-ENTMCNC: 23.7 PG — LOW (ref 27–34)
MCHC RBC-ENTMCNC: 31.4 G/DL — LOW (ref 32–36)
MCV RBC AUTO: 75.4 FL — LOW (ref 80–100)
METHOD TYPE: SIGNIFICANT CHANGE UP
METHOD TYPE: SIGNIFICANT CHANGE UP
MONOCYTES # BLD AUTO: 0.76 K/UL — SIGNIFICANT CHANGE UP (ref 0–0.9)
MONOCYTES NFR BLD AUTO: 4.7 % — SIGNIFICANT CHANGE UP (ref 2–14)
NEUTROPHILS # BLD AUTO: 14.37 K/UL — HIGH (ref 1.8–7.4)
NEUTROPHILS NFR BLD AUTO: 88.2 % — HIGH (ref 43–77)
NRBC # BLD AUTO: 0.06 K/UL — HIGH (ref 0–0)
NRBC # FLD: 0.06 K/UL — HIGH (ref 0–0)
NRBC BLD AUTO-RTO: 0 /100 WBCS — SIGNIFICANT CHANGE UP (ref 0–0)
ORGANISM # SPEC MICROSCOPIC CNT: ABNORMAL
PLATELET # BLD AUTO: 299 K/UL — SIGNIFICANT CHANGE UP (ref 150–400)
POTASSIUM SERPL-MCNC: 4 MMOL/L — SIGNIFICANT CHANGE UP (ref 3.5–5.3)
POTASSIUM SERPL-SCNC: 4 MMOL/L — SIGNIFICANT CHANGE UP (ref 3.5–5.3)
PROCALCITONIN SERPL-MCNC: 4.12 NG/ML — HIGH (ref 0.02–0.1)
RBC # BLD: 3.42 M/UL — LOW (ref 4.2–5.8)
RBC # FLD: 17.5 % — HIGH (ref 10.3–14.5)
SODIUM SERPL-SCNC: 129 MMOL/L — LOW (ref 135–145)
SPECIMEN SOURCE: SIGNIFICANT CHANGE UP
WBC # BLD: 16.3 K/UL — HIGH (ref 3.8–10.5)
WBC # FLD AUTO: 16.3 K/UL — HIGH (ref 3.8–10.5)

## 2025-03-05 PROCEDURE — 99233 SBSQ HOSP IP/OBS HIGH 50: CPT | Mod: FS

## 2025-03-05 PROCEDURE — 99232 SBSQ HOSP IP/OBS MODERATE 35: CPT

## 2025-03-05 RX ORDER — PIPERACILLIN-TAZO-DEXTROSE,ISO 3.375G/5
4.5 IV SOLUTION, PIGGYBACK PREMIX FROZEN(ML) INTRAVENOUS EVERY 12 HOURS
Refills: 0 | Status: DISCONTINUED | OUTPATIENT
Start: 2025-03-05 | End: 2025-03-10

## 2025-03-05 RX ADMIN — Medication 15 MILLILITER(S): at 06:15

## 2025-03-05 RX ADMIN — Medication 2.5 MILLIGRAM(S): at 03:17

## 2025-03-05 RX ADMIN — APIXABAN 5 MILLIGRAM(S): 2.5 TABLET, FILM COATED ORAL at 17:50

## 2025-03-05 RX ADMIN — INSULIN LISPRO 1: 100 INJECTION, SOLUTION INTRAVENOUS; SUBCUTANEOUS at 06:16

## 2025-03-05 RX ADMIN — Medication 2.5 MILLIGRAM(S): at 08:16

## 2025-03-05 RX ADMIN — WHITE PETROLATUM 1 APPLICATION(S): 1 OINTMENT TOPICAL at 17:49

## 2025-03-05 RX ADMIN — ATORVASTATIN CALCIUM 20 MILLIGRAM(S): 80 TABLET, FILM COATED ORAL at 21:45

## 2025-03-05 RX ADMIN — INSULIN LISPRO 1: 100 INJECTION, SOLUTION INTRAVENOUS; SUBCUTANEOUS at 12:23

## 2025-03-05 RX ADMIN — INSULIN LISPRO 4 UNIT(S): 100 INJECTION, SOLUTION INTRAVENOUS; SUBCUTANEOUS at 06:16

## 2025-03-05 RX ADMIN — Medication 15 MILLILITER(S): at 17:49

## 2025-03-05 RX ADMIN — Medication 50 MILLIGRAM(S): at 21:45

## 2025-03-05 RX ADMIN — INSULIN LISPRO 2: 100 INJECTION, SOLUTION INTRAVENOUS; SUBCUTANEOUS at 23:45

## 2025-03-05 RX ADMIN — Medication 1 TABLET(S): at 17:49

## 2025-03-05 RX ADMIN — INSULIN LISPRO 4 UNIT(S): 100 INJECTION, SOLUTION INTRAVENOUS; SUBCUTANEOUS at 23:46

## 2025-03-05 RX ADMIN — Medication 50 MILLIGRAM(S): at 04:36

## 2025-03-05 RX ADMIN — Medication 300 MILLIGRAM(S): at 17:49

## 2025-03-05 RX ADMIN — INSULIN LISPRO 4 UNIT(S): 100 INJECTION, SOLUTION INTRAVENOUS; SUBCUTANEOUS at 17:54

## 2025-03-05 RX ADMIN — INSULIN LISPRO 1: 100 INJECTION, SOLUTION INTRAVENOUS; SUBCUTANEOUS at 17:54

## 2025-03-05 RX ADMIN — AMLODIPINE BESYLATE 10 MILLIGRAM(S): 10 TABLET ORAL at 04:36

## 2025-03-05 RX ADMIN — INSULIN GLARGINE-YFGN 12 UNIT(S): 100 INJECTION, SOLUTION SUBCUTANEOUS at 23:46

## 2025-03-05 RX ADMIN — EPOETIN ALFA 10000 UNIT(S): 10000 SOLUTION INTRAVENOUS; SUBCUTANEOUS at 14:12

## 2025-03-05 RX ADMIN — Medication 1 APPLICATION(S): at 12:21

## 2025-03-05 RX ADMIN — WHITE PETROLATUM 1 APPLICATION(S): 1 OINTMENT TOPICAL at 06:15

## 2025-03-05 RX ADMIN — Medication 40 MILLIGRAM(S): at 06:15

## 2025-03-05 RX ADMIN — Medication 25 GRAM(S): at 17:50

## 2025-03-05 RX ADMIN — Medication 50 MILLIGRAM(S): at 17:48

## 2025-03-05 RX ADMIN — APIXABAN 5 MILLIGRAM(S): 2.5 TABLET, FILM COATED ORAL at 06:15

## 2025-03-05 RX ADMIN — Medication 2.5 MILLIGRAM(S): at 22:55

## 2025-03-05 RX ADMIN — Medication 2.5 MILLIGRAM(S): at 15:55

## 2025-03-05 RX ADMIN — INSULIN LISPRO 4 UNIT(S): 100 INJECTION, SOLUTION INTRAVENOUS; SUBCUTANEOUS at 12:23

## 2025-03-05 RX ADMIN — Medication 25 GRAM(S): at 04:07

## 2025-03-05 NOTE — PROGRESS NOTE ADULT - ASSESSMENT
75 y/o M PMhx trach/vent (last changed 10/23/24), CVA x2 with residual R hemiplegia, COPD, ESRD on HD MWF who presented to ED for leukocytosis when initially planned for fistulogram and noted to have a WBC of 18.     fever  febrile 3/3  - CXR 3/3 w/ near total L lung opacification  - started on zosyn  repeat CXR with near resolution  ? mucous plugging    VAP- treated  SARS-CoV-2/ RSV/ flu PCR negative  MRSA PCR negative  CXR- Right lower lobe infiltrate  blood cultures- NGTD  resp cx w/  pseudomonas  s/p cefepime x 7 days, completed 2/25    UTI- treated  UA w/ significant pyuria though noted epithelial cells indicating contamination  unable to assess urinary symptoms given patient's mentation  repeat urine cx also w/ E faecium  s/p vanc course, completed 2/25    DVT  RUE US- age-indeterminate, non-occlusive deep vein thrombosis noted in the right brachial vein  on eliquis    ESRD  HD per nephrology    trach aspirate culture w/ pseudomonas  patient w/ multiple strains of pseudomonas but based on bronch culture recommend  Recommendations  c/w zosyn for now  - adjust dose to 4.5g  f/u resp culture pseudomonas sensitivities  aspiration precautions  contact isolation for candida auris  trend wbc, [facility] Infectious Disease  [phone]  After 5pm on weekdays and all day on weekends - please call [phone]  Available on [employer] teams    Thank you for consulting us and involving us in the management of this patients case. In addition to reviewing history, imaging, documents, labs, microbiology, took into account antibiotic stewardship, local antibiogram and infection control strategies and potential transmission issues.

## 2025-03-05 NOTE — PROGRESS NOTE ADULT - SUBJECTIVE AND OBJECTIVE BOX
[facility]  AUGUST Carbajal Y. Patel, S. Shah, G. Casimir  [phone]  ([phone] - weekdays after 5pm and weekends)    Name: JIMMY BLAND  Age/Gender: 74y Male  MRN: [id number]    Interval History:  Notes reviewed.   Afebrile.     Allergies: No Known Allergies      Objective:  Vitals:   T(F): 96.8 (03-05-25 @ 12:00), Max: 98.1 (03-05-25 @ 00:00)  HR: 82 (03-05-25 @ 12:00) (74 - 94)  BP: 163/67 (03-05-25 @ 12:00) (136/57 - 189/72)  RR: 17 (03-05-25 @ 12:00) (17 - 23)  SpO2: 100% (03-05-25 @ 12:00) (98% - 100%)  Physical Examination:  General: frail appearing  HEENT: anicteric, tracheostomy, on vent  Cardio: S1, S2, normal rate  Resp: decreased breath sounds  Abd: Soft. Nondistended. PEG  Ext: no LE edema  Skin: warm, dry    Laboratory Studies:  CBC:                       8.1    16.30 )-----------( 299      ( 05 Mar 2025 11:34 )             25.8     WBC Trend:  16.30 03-05-25 @ 11:34  13.65 03-03-25 @ 07:00  13.96 03-02-25 @ 09:09  15.75 03-02-25 @ 01:38  15.52 03-01-25 @ 06:05  17.77 02-28-25 @ 13:30  17.98 02-27-25 @ 05:00    CMP: 03-04    132[L]  |  90[L]  |  54[H]  ----------------------------<  257[H]  4.2   |  27  |  2.80[H]    Ca    9.4      04 Mar 2025 06:18  Phos  2.6     03-04  Mg     2.80     03-04    TPro  x   /  Alb  3.0[L]  /  TBili  x   /  DBili  x   /  AST  x   /  ALT  x   /  AlkPhos  x   03-04      LIVER FUNCTIONS - ( 04 Mar 2025 06:18 )  Alb: 3.0 g/dL / Pro: x     / ALK PHOS: x     / ALT: x     / AST: x     / GGT: x             Urinalysis Basic - ( 04 Mar 2025 06:18 )    Color: x / Appearance: x / SG: x / pH: x  Gluc: 257 mg/dL / Ketone: x  / Bili: x / Urobili: x   Blood: x / Protein: x / Nitrite: x   Leuk Esterase: x / RBC: x / WBC x   Sq Epi: x / Non Sq Epi: x / Bacteria: x      Microbiology: reviewed     Culture - Blood (collected 03-03-25 @ 16:27)  Source: Blood Blood-Peripheral  Preliminary Report (03-04-25 @ 22:02):    No growth at 24 hours    Culture - Blood (collected 03-03-25 @ 16:23)  Source: Blood Blood-Venous  Preliminary Report (03-04-25 @ 22:02):    No growth at 24 hours    Culture - Sputum (collected 03-03-25 @ 14:10)  Source: Trach Asp Tracheal Aspirate  Gram Stain (03-04-25 @ 04:28):    Few polymorphonuclear leukocytes per low power field    No Squamous epithelial cells per low power field    Moderate Gram Negative Rods seen per oil power field  Preliminary Report (03-04-25 @ 19:29):    Numerous Pseudomonas aeruginosa    Commensal dominick consistent with body site    Culture - Fungal, Bronchial (collected 02-26-25 @ 16:43)  Source: Bronchial Bronchial Lavage  Preliminary Report (03-02-25 @ 09:57):    No growth    Culture - Bronchial (collected 02-26-25 @ 16:43)  Source: Bronchial Bronchial Lavage  Gram Stain (02-26-25 @ 23:04):    Few polymorphonuclear leukocytes per low power field    No squamous epithelial cells    Few Gram Negative Rods per oil power field  Final Report (02-28-25 @ 17:24):    Few Pseudomonas aeruginosa    Commensal dominick consistent with body site  Organism: Pseudomonas aeruginosa (02-28-25 @ 17:24)  Organism: Pseudomonas aeruginosa (02-28-25 @ 17:24)      -  Levofloxacin: S <=0.5      -  Aztreonam: S <=4      -  Cefepime: S <=2      -  Piperacillin/Tazobactam: S <=8      -  Ciprofloxacin: S <=0.25      -  Imipenem: S 2      Method Type: VIDA      -  Meropenem: S <=1      -  Ceftazidime: S <=1    Culture - Urine (collected 02-20-25 @ 18:00)  Source: Clean Catch Clean Catch (Midstream)  Final Report (02-23-25 @ 08:45):    >100,000 CFU/ml Enterococcus faecium  Organism: Enterococcus faecium (02-23-25 @ 08:45)  Organism: Enterococcus faecium (02-23-25 @ 08:45)      -  Levofloxacin: R >4      -  Nitrofurantoin: S <=32 Should not be used to treat pyelonephritis.      -  Vancomycin: S 0.5      -  Ciprofloxacin: R >2      -  Ampicillin: R >8 Predicts results to ampicillin/sulbactam, amoxacillin-clavulanate and  piperacillin-tazobactam.      -  Tetracycline: R >8      Method Type: VIDA    Culture - INF Candida auris (collected 02-19-25 @ 18:46)  Source: Nares/Axilla/Groin Nares/Axilla/Groin  Final Report (02-22-25 @ 21:59):    Culture POSITIVE for Candida auris, susceptibilities not performed for    this specimen type.    This surveillance culture is intended for Infection Control purposes only.    Culture - Sputum (collected 02-19-25 @ 15:54)  Source: Trach Asp Tracheal Aspirate  Gram Stain (02-19-25 @ 22:37):    Numerous polymorphonuclear leukocytes per low power field    Rare Squamous epithelial cells per low power field    Numerous Gram Negative Rods seen per oil power field    Few Gram Positive Rods seen per oil power field  Final Report (02-21-25 @ 21:20):    Few Pseudomonas aeruginosa (Carbapenem Resistant)    Commensal dominick consistent with body site  Organism: Pseudomonas aeruginosa (Carbapenem Resistant) (02-21-25 @ 21:20)  Organism: Pseudomonas aeruginosa (Carbapenem Resistant) (02-21-25 @ 21:20)      -  Resistance Gene to Carbapenem: Nondet      Method Type: CarbaR  Organism: Pseudomonas aeruginosa (Carbapenem Resistant) (02-21-25 @ 21:20)      -  Levofloxacin: S <=0.5      -  Ceftazidime/Avibactam: S <=4      -  Aztreonam: R >16      -  Cefepime: S 8      -  Piperacillin/Tazobactam: R 64      -  Ciprofloxacin: S <=0.25      -  Imipenem: R >8      Method Type: VIDA      -  Meropenem: R >8      -  Ceftazidime: I 16      -  Ceftolozane/tazobactam: S <=2        Radiology: reviewed     Medications:  albuterol    0.083% 2.5 milliGRAM(s) Nebulizer every 6 hours  amLODIPine   Tablet 10 milliGRAM(s) Oral daily  apixaban 5 milliGRAM(s) Oral every 12 hours  AQUAPHOR (petrolatum Ointment) 1 Application(s) Topical two times a day  atorvastatin 20 milliGRAM(s) Oral at bedtime  chlorhexidine 0.12% Liquid 15 milliLiter(s) Oral Mucosa every 12 hours  chlorhexidine 2% Cloths 1 Application(s) Topical daily  dextrose 5%. 1000 milliLiter(s) IV Continuous <Continuous>  dextrose 5%. 1000 milliLiter(s) IV Continuous <Continuous>  dextrose 50% Injectable 25 Gram(s) IV Push once  dextrose 50% Injectable 12.5 Gram(s) IV Push once  dextrose 50% Injectable 25 Gram(s) IV Push once  dextrose Oral Gel 15 Gram(s) Oral once PRN  epoetin reilly-epbx (RETACRIT) Injectable 93730 Unit(s) IV Push <User Schedule>  ferrous    sulfate Liquid 300 milliGRAM(s) Enteral Tube daily  glucagon  Injectable 1 milliGRAM(s) IntraMuscular once  hydrALAZINE 50 milliGRAM(s) Oral three times a day  insulin glargine Injectable (LANTUS) 12 Unit(s) SubCutaneous at bedtime  insulin lispro (ADMELOG) corrective regimen sliding scale   SubCutaneous every 6 hours  insulin lispro Injectable (ADMELOG) 4 Unit(s) SubCutaneous every 6 hours  Nephro-noam 1 Tablet(s) Oral daily  pantoprazole   Suspension 40 milliGRAM(s) Oral before breakfast  piperacillin/tazobactam IVPB.. 3.375 Gram(s) IV Intermittent every 12 hours  sodium chloride 0.9% Bolus. 100 milliLiter(s) IV Bolus every 5 minutes PRN  sodium chloride 0.9% for Nebulization 3 milliLiter(s) Nebulizer every 6 hours    Antimicrobials:  piperacillin/tazobactam IVPB.. 3.375 Gram(s) IV Intermittent every 12 hours

## 2025-03-05 NOTE — PROGRESS NOTE ADULT - SUBJECTIVE AND OBJECTIVE BOX
CHIEF COMPLAINT: Patient is a 74y old  Male who presents with a chief complaint of 2/18/25 was in cath lab earlier today with vascular surgery for fistulogram and noted to have a WBC of 18 and sent to ED and admitted (04 Mar 2025 12:22)      INTERVAL EVENTS:   - no acute overnight events, VSS, afebrile on the ventilator  - continued on IV Zosyn 3/4 - )    ROS: Seen by bedside during AM rounds     OBJECTIVE:  ICU Vital Signs Last 24 Hrs  T(C): 36.4 (05 Mar 2025 04:00), Max: 36.7 (05 Mar 2025 00:00)  T(F): 97.6 (05 Mar 2025 04:00), Max: 98.1 (05 Mar 2025 00:00)  HR: 85 (05 Mar 2025 04:40) (79 - 94)  BP: 186/66 (05 Mar 2025 04:00) (167/79 - 189/72)  BP(mean): --  ABP: --  ABP(mean): --  RR: 17 (05 Mar 2025 04:00) (16 - 23)  SpO2: 100% (05 Mar 2025 04:40) (98% - 100%)    O2 Parameters below as of 05 Mar 2025 04:00  Patient On (Oxygen Delivery Method): ventilator    O2 Concentration (%): 40      Mode: AC/ CMV (Assist Control/ Continuous Mandatory Ventilation), RR (machine): 15, TV (machine): 400, FiO2: 40, PEEP: 6, ITime: 0.72, MAP: 10, PIP: 25    03-04 @ 07:01  -  03-05 @ 07:00  --------------------------------------------------------  IN: 700 mL / OUT: 100 mL / NET: 600 mL      CAPILLARY BLOOD GLUCOSE      POCT Blood Glucose.: 153 mg/dL (05 Mar 2025 06:13)      PHYSICAL EXAM:  General:   HEENT:   Lymph Nodes:  Neck:   Respiratory:   Cardiovascular:   Abdomen:   Extremities:   Skin:   Neurological:  Psychiatry:    Mode: AC/ CMV (Assist Control/ Continuous Mandatory Ventilation)  RR (machine): 15  TV (machine): 400  FiO2: 40  PEEP: 6  ITime: 0.72  MAP: 10  PIP: 25      HOSPITAL MEDICATIONS:  MEDICATIONS  (STANDING):  albuterol    0.083% 2.5 milliGRAM(s) Nebulizer every 6 hours  amLODIPine   Tablet 10 milliGRAM(s) Oral daily  apixaban 5 milliGRAM(s) Oral every 12 hours  AQUAPHOR (petrolatum Ointment) 1 Application(s) Topical two times a day  atorvastatin 20 milliGRAM(s) Oral at bedtime  chlorhexidine 0.12% Liquid 15 milliLiter(s) Oral Mucosa every 12 hours  chlorhexidine 2% Cloths 1 Application(s) Topical daily  dextrose 5%. 1000 milliLiter(s) (100 mL/Hr) IV Continuous <Continuous>  dextrose 5%. 1000 milliLiter(s) (50 mL/Hr) IV Continuous <Continuous>  dextrose 50% Injectable 25 Gram(s) IV Push once  dextrose 50% Injectable 12.5 Gram(s) IV Push once  dextrose 50% Injectable 25 Gram(s) IV Push once  epoetin reilly-epbx (RETACRIT) Injectable 17162 Unit(s) IV Push <User Schedule>  ferrous    sulfate Liquid 300 milliGRAM(s) Enteral Tube daily  glucagon  Injectable 1 milliGRAM(s) IntraMuscular once  hydrALAZINE 50 milliGRAM(s) Oral three times a day  insulin glargine Injectable (LANTUS) 12 Unit(s) SubCutaneous at bedtime  insulin lispro (ADMELOG) corrective regimen sliding scale   SubCutaneous every 6 hours  insulin lispro Injectable (ADMELOG) 4 Unit(s) SubCutaneous every 6 hours  Nephro-noam 1 Tablet(s) Oral daily  pantoprazole   Suspension 40 milliGRAM(s) Oral before breakfast  piperacillin/tazobactam IVPB.. 3.375 Gram(s) IV Intermittent every 12 hours  sodium chloride 0.9% for Nebulization 3 milliLiter(s) Nebulizer every 6 hours    MEDICATIONS  (PRN):  dextrose Oral Gel 15 Gram(s) Oral once PRN Blood Glucose LESS THAN 70 milliGRAM(s)/deciliter  sodium chloride 0.9% Bolus. 100 milliLiter(s) IV Bolus every 5 minutes PRN SBP LESS THAN or EQUAL to 80 mmHg      LABS:    03-04    132[L]  |  90[L]  |  54[H]  ----------------------------<  257[H]  4.2   |  27  |  2.80[H]    Ca    9.4      04 Mar 2025 06:18  Phos  2.6     03-04  Mg     2.80     03-04    TPro  x   /  Alb  3.0[L]  /  TBili  x   /  DBili  x   /  AST  x   /  ALT  x   /  AlkPhos  x   03-04      Urinalysis Basic - ( 04 Mar 2025 06:18 )    Color: x / Appearance: x / SG: x / pH: x  Gluc: 257 mg/dL / Ketone: x  / Bili: x / Urobili: x   Blood: x / Protein: x / Nitrite: x   Leuk Esterase: x / RBC: x / WBC x   Sq Epi: x / Non Sq Epi: x / Bacteria: x         CHIEF COMPLAINT: Patient is a 74y old  Male who presents with a chief complaint of 2/18/25 was in cath lab earlier today with vascular surgery for fistulogram and noted to have a WBC of 18 and sent to ED and admitted (04 Mar 2025 12:22)      INTERVAL EVENTS:   - no acute overnight events, VSS, afebrile on the ventilator  - continued on IV Zosyn 3/4 - )    ROS: Seen by bedside during AM rounds. Unable to attain ROS due to vent status.     OBJECTIVE:  ICU Vital Signs Last 24 Hrs  T(C): 36.4 (05 Mar 2025 04:00), Max: 36.7 (05 Mar 2025 00:00)  T(F): 97.6 (05 Mar 2025 04:00), Max: 98.1 (05 Mar 2025 00:00)  HR: 85 (05 Mar 2025 04:40) (79 - 94)  BP: 186/66 (05 Mar 2025 04:00) (167/79 - 189/72)  BP(mean): --  ABP: --  ABP(mean): --  RR: 17 (05 Mar 2025 04:00) (16 - 23)  SpO2: 100% (05 Mar 2025 04:40) (98% - 100%)    O2 Parameters below as of 05 Mar 2025 04:00  Patient On (Oxygen Delivery Method): ventilator    O2 Concentration (%): 40      Mode: AC/ CMV (Assist Control/ Continuous Mandatory Ventilation), RR (machine): 15, TV (machine): 400, FiO2: 40, PEEP: 6, ITime: 0.72, MAP: 10, PIP: 25    03-04 @ 07:01  -  03-05 @ 07:00  --------------------------------------------------------  IN: 700 mL / OUT: 100 mL / NET: 600 mL      CAPILLARY BLOOD GLUCOSE      POCT Blood Glucose.: 153 mg/dL (05 Mar 2025 06:13)      PHYSICAL EXAM:  General: NAD, laying in bed with eyes closed, receiving hemodialysis   HEENT: NCAT, trach midlin   Neck:   Respiratory:   Cardiovascular: RRR, +S1 and S2. No murmurs, rubs, gallops appreciated.   Abdomen: Nontender, nondistended, soft. Normoactive BS.   Extremities: No edema. No active ROM of b/l UE and LE.   Skin: Warm, cap refill 2 sec.   Neurological: Alert, tracks with eyes, does not respond to verbal commands.   Psychiatry: Normal affect.     Mode: AC/ CMV (Assist Control/ Continuous Mandatory Ventilation)  RR (machine): 15  TV (machine): 400  FiO2: 40  PEEP: 6  ITime: 0.72  MAP: 10  PIP: 25      HOSPITAL MEDICATIONS:  MEDICATIONS  (STANDING):  albuterol    0.083% 2.5 milliGRAM(s) Nebulizer every 6 hours  amLODIPine   Tablet 10 milliGRAM(s) Oral daily  apixaban 5 milliGRAM(s) Oral every 12 hours  AQUAPHOR (petrolatum Ointment) 1 Application(s) Topical two times a day  atorvastatin 20 milliGRAM(s) Oral at bedtime  chlorhexidine 0.12% Liquid 15 milliLiter(s) Oral Mucosa every 12 hours  chlorhexidine 2% Cloths 1 Application(s) Topical daily  dextrose 5%. 1000 milliLiter(s) (100 mL/Hr) IV Continuous <Continuous>  dextrose 5%. 1000 milliLiter(s) (50 mL/Hr) IV Continuous <Continuous>  dextrose 50% Injectable 25 Gram(s) IV Push once  dextrose 50% Injectable 12.5 Gram(s) IV Push once  dextrose 50% Injectable 25 Gram(s) IV Push once  epoetin reilly-epbx (RETACRIT) Injectable 68492 Unit(s) IV Push <User Schedule>  ferrous    sulfate Liquid 300 milliGRAM(s) Enteral Tube daily  glucagon  Injectable 1 milliGRAM(s) IntraMuscular once  hydrALAZINE 50 milliGRAM(s) Oral three times a day  insulin glargine Injectable (LANTUS) 12 Unit(s) SubCutaneous at bedtime  insulin lispro (ADMELOG) corrective regimen sliding scale   SubCutaneous every 6 hours  insulin lispro Injectable (ADMELOG) 4 Unit(s) SubCutaneous every 6 hours  Nephro-noam 1 Tablet(s) Oral daily  pantoprazole   Suspension 40 milliGRAM(s) Oral before breakfast  piperacillin/tazobactam IVPB.. 3.375 Gram(s) IV Intermittent every 12 hours  sodium chloride 0.9% for Nebulization 3 milliLiter(s) Nebulizer every 6 hours    MEDICATIONS  (PRN):  dextrose Oral Gel 15 Gram(s) Oral once PRN Blood Glucose LESS THAN 70 milliGRAM(s)/deciliter  sodium chloride 0.9% Bolus. 100 milliLiter(s) IV Bolus every 5 minutes PRN SBP LESS THAN or EQUAL to 80 mmHg      LABS:    03-04    132[L]  |  90[L]  |  54[H]  ----------------------------<  257[H]  4.2   |  27  |  2.80[H]    Ca    9.4      04 Mar 2025 06:18  Phos  2.6     03-04  Mg     2.80     03-04    TPro  x   /  Alb  3.0[L]  /  TBili  x   /  DBili  x   /  AST  x   /  ALT  x   /  AlkPhos  x   03-04      Urinalysis Basic - ( 04 Mar 2025 06:18 )    Color: x / Appearance: x / SG: x / pH: x  Gluc: 257 mg/dL / Ketone: x  / Bili: x / Urobili: x   Blood: x / Protein: x / Nitrite: x   Leuk Esterase: x / RBC: x / WBC x   Sq Epi: x / Non Sq Epi: x / Bacteria: x         CHIEF COMPLAINT: Patient is a 74y old  Male who presents with a chief complaint of 2/18/25 was in cath lab earlier today with vascular surgery for fistulogram and noted to have a WBC of 18 and sent to ED and admitted (04 Mar 2025 12:22)      INTERVAL EVENTS:   - no acute overnight events, VSS, afebrile on the ventilator  - continued on IV Zosyn 3/4 - )    ROS: Seen by bedside during AM rounds. Unable to attain ROS due to vent status.     OBJECTIVE:  ICU Vital Signs Last 24 Hrs  T(C): 36.4 (05 Mar 2025 04:00), Max: 36.7 (05 Mar 2025 00:00)  T(F): 97.6 (05 Mar 2025 04:00), Max: 98.1 (05 Mar 2025 00:00)  HR: 85 (05 Mar 2025 04:40) (79 - 94)  BP: 186/66 (05 Mar 2025 04:00) (167/79 - 189/72)  BP(mean): --  ABP: --  ABP(mean): --  RR: 17 (05 Mar 2025 04:00) (16 - 23)  SpO2: 100% (05 Mar 2025 04:40) (98% - 100%)    O2 Parameters below as of 05 Mar 2025 04:00  Patient On (Oxygen Delivery Method): ventilator    O2 Concentration (%): 40      Mode: AC/ CMV (Assist Control/ Continuous Mandatory Ventilation), RR (machine): 15, TV (machine): 400, FiO2: 40, PEEP: 6, ITime: 0.72, MAP: 10, PIP: 25    03-04 @ 07:01  -  03-05 @ 07:00  --------------------------------------------------------  IN: 700 mL / OUT: 100 mL / NET: 600 mL      CAPILLARY BLOOD GLUCOSE      POCT Blood Glucose.: 153 mg/dL (05 Mar 2025 06:13)      PHYSICAL EXAM:  General: NAD, laying in bed with eyes closed, receiving hemodialysis   HEENT: NCAT   Neck: Supple, trach midline connected to vent.   Respiratory: Coarse breath sounds b/l. Normal respiratory effort and no accessory muscle use. No wheezes or rales appreciated.   Cardiovascular: RRR, +S1 and S2.   Abdomen: Nontender, nondistended, soft. Normoactive BS.   Extremities: No edema. No active ROM of b/l UE and LE.   Skin: Warm, cap refill 2 sec.   Neurological: Arousable to touch, alert, tracks with eyes, does not respond to verbal commands.   Psychiatry: Normal affect, not agitated.     Mode: AC/ CMV (Assist Control/ Continuous Mandatory Ventilation)  RR (machine): 15  TV (machine): 400  FiO2: 40  PEEP: 6  ITime: 0.72  MAP: 10  PIP: 25      HOSPITAL MEDICATIONS:  MEDICATIONS  (STANDING):  albuterol    0.083% 2.5 milliGRAM(s) Nebulizer every 6 hours  amLODIPine   Tablet 10 milliGRAM(s) Oral daily  apixaban 5 milliGRAM(s) Oral every 12 hours  AQUAPHOR (petrolatum Ointment) 1 Application(s) Topical two times a day  atorvastatin 20 milliGRAM(s) Oral at bedtime  chlorhexidine 0.12% Liquid 15 milliLiter(s) Oral Mucosa every 12 hours  chlorhexidine 2% Cloths 1 Application(s) Topical daily  dextrose 5%. 1000 milliLiter(s) (100 mL/Hr) IV Continuous <Continuous>  dextrose 5%. 1000 milliLiter(s) (50 mL/Hr) IV Continuous <Continuous>  dextrose 50% Injectable 25 Gram(s) IV Push once  dextrose 50% Injectable 12.5 Gram(s) IV Push once  dextrose 50% Injectable 25 Gram(s) IV Push once  epoetin reilly-epbx (RETACRIT) Injectable 46387 Unit(s) IV Push <User Schedule>  ferrous    sulfate Liquid 300 milliGRAM(s) Enteral Tube daily  glucagon  Injectable 1 milliGRAM(s) IntraMuscular once  hydrALAZINE 50 milliGRAM(s) Oral three times a day  insulin glargine Injectable (LANTUS) 12 Unit(s) SubCutaneous at bedtime  insulin lispro (ADMELOG) corrective regimen sliding scale   SubCutaneous every 6 hours  insulin lispro Injectable (ADMELOG) 4 Unit(s) SubCutaneous every 6 hours  Nephro-noam 1 Tablet(s) Oral daily  pantoprazole   Suspension 40 milliGRAM(s) Oral before breakfast  piperacillin/tazobactam IVPB.. 3.375 Gram(s) IV Intermittent every 12 hours  sodium chloride 0.9% for Nebulization 3 milliLiter(s) Nebulizer every 6 hours    MEDICATIONS  (PRN):  dextrose Oral Gel 15 Gram(s) Oral once PRN Blood Glucose LESS THAN 70 milliGRAM(s)/deciliter  sodium chloride 0.9% Bolus. 100 milliLiter(s) IV Bolus every 5 minutes PRN SBP LESS THAN or EQUAL to 80 mmHg      LABS:    03-04    132[L]  |  90[L]  |  54[H]  ----------------------------<  257[H]  4.2   |  27  |  2.80[H]    Ca    9.4      04 Mar 2025 06:18  Phos  2.6     03-04  Mg     2.80     03-04    TPro  x   /  Alb  3.0[L]  /  TBili  x   /  DBili  x   /  AST  x   /  ALT  x   /  AlkPhos  x   03-04      Urinalysis Basic - ( 04 Mar 2025 06:18 )    Color: x / Appearance: x / SG: x / pH: x  Gluc: 257 mg/dL / Ketone: x  / Bili: x / Urobili: x   Blood: x / Protein: x / Nitrite: x   Leuk Esterase: x / RBC: x / WBC x   Sq Epi: x / Non Sq Epi: x / Bacteria: x         CHIEF COMPLAINT: Patient is a 74y old  Male who presents with a chief complaint of 2/18/25 was in cath lab earlier today with vascular surgery for fistulogram and noted to have a WBC of 18 and sent to ED and admitted (04 Mar 2025 12:22)      INTERVAL EVENTS:   - no acute overnight events, VSS, afebrile on the ventilator  - continued on IV Zosyn 3/4 - ), increased dosage today to 4.5 g Q12 hours    ROS: Seen by bedside during AM rounds. Unable to attain ROS due to vent status.     OBJECTIVE:  ICU Vital Signs Last 24 Hrs  T(C): 36.4 (05 Mar 2025 04:00), Max: 36.7 (05 Mar 2025 00:00)  T(F): 97.6 (05 Mar 2025 04:00), Max: 98.1 (05 Mar 2025 00:00)  HR: 85 (05 Mar 2025 04:40) (79 - 94)  BP: 186/66 (05 Mar 2025 04:00) (167/79 - 189/72)  BP(mean): --  ABP: --  ABP(mean): --  RR: 17 (05 Mar 2025 04:00) (16 - 23)  SpO2: 100% (05 Mar 2025 04:40) (98% - 100%)    O2 Parameters below as of 05 Mar 2025 04:00  Patient On (Oxygen Delivery Method): ventilator    O2 Concentration (%): 40      Mode: AC/ CMV (Assist Control/ Continuous Mandatory Ventilation), RR (machine): 15, TV (machine): 400, FiO2: 40, PEEP: 6, ITime: 0.72, MAP: 10, PIP: 25    03-04 @ 07:01  -  03-05 @ 07:00  --------------------------------------------------------  IN: 700 mL / OUT: 100 mL / NET: 600 mL      CAPILLARY BLOOD GLUCOSE      POCT Blood Glucose.: 153 mg/dL (05 Mar 2025 06:13)      PHYSICAL EXAM:  General: NAD, laying in bed with eyes closed, receiving hemodialysis   HEENT: NCAT   Neck: Supple, trach midline connected to vent.   Respiratory: Coarse breath sounds b/l. Normal respiratory effort and no accessory muscle use. No wheezes or rales appreciated.   Cardiovascular: RRR, +S1 and S2.   Abdomen: Nontender, nondistended, soft. Normoactive BS. +PEG  Extremities: No edema. No active ROM of b/l UE and LE.   Skin: Warm, cap refill 2 sec.   Neurological: Arousable to touch, alert, tracks with eyes, does not respond to verbal commands.   Psychiatry: Normal affect, not agitated.     Mode: AC/ CMV (Assist Control/ Continuous Mandatory Ventilation)  RR (machine): 15  TV (machine): 400  FiO2: 40  PEEP: 6  ITime: 0.72  MAP: 10  PIP: 25      HOSPITAL MEDICATIONS:  MEDICATIONS  (STANDING):  albuterol    0.083% 2.5 milliGRAM(s) Nebulizer every 6 hours  amLODIPine   Tablet 10 milliGRAM(s) Oral daily  apixaban 5 milliGRAM(s) Oral every 12 hours  AQUAPHOR (petrolatum Ointment) 1 Application(s) Topical two times a day  atorvastatin 20 milliGRAM(s) Oral at bedtime  chlorhexidine 0.12% Liquid 15 milliLiter(s) Oral Mucosa every 12 hours  chlorhexidine 2% Cloths 1 Application(s) Topical daily  dextrose 5%. 1000 milliLiter(s) (100 mL/Hr) IV Continuous <Continuous>  dextrose 5%. 1000 milliLiter(s) (50 mL/Hr) IV Continuous <Continuous>  dextrose 50% Injectable 25 Gram(s) IV Push once  dextrose 50% Injectable 12.5 Gram(s) IV Push once  dextrose 50% Injectable 25 Gram(s) IV Push once  epoetin reilly-epbx (RETACRIT) Injectable 71655 Unit(s) IV Push <User Schedule>  ferrous    sulfate Liquid 300 milliGRAM(s) Enteral Tube daily  glucagon  Injectable 1 milliGRAM(s) IntraMuscular once  hydrALAZINE 50 milliGRAM(s) Oral three times a day  insulin glargine Injectable (LANTUS) 12 Unit(s) SubCutaneous at bedtime  insulin lispro (ADMELOG) corrective regimen sliding scale   SubCutaneous every 6 hours  insulin lispro Injectable (ADMELOG) 4 Unit(s) SubCutaneous every 6 hours  Nephro-noam 1 Tablet(s) Oral daily  pantoprazole   Suspension 40 milliGRAM(s) Oral before breakfast  piperacillin/tazobactam IVPB.. 3.375 Gram(s) IV Intermittent every 12 hours  sodium chloride 0.9% for Nebulization 3 milliLiter(s) Nebulizer every 6 hours    MEDICATIONS  (PRN):  dextrose Oral Gel 15 Gram(s) Oral once PRN Blood Glucose LESS THAN 70 milliGRAM(s)/deciliter  sodium chloride 0.9% Bolus. 100 milliLiter(s) IV Bolus every 5 minutes PRN SBP LESS THAN or EQUAL to 80 mmHg      LABS:    03-04    132[L]  |  90[L]  |  54[H]  ----------------------------<  257[H]  4.2   |  27  |  2.80[H]    Ca    9.4      04 Mar 2025 06:18  Phos  2.6     03-04  Mg     2.80     03-04    TPro  x   /  Alb  3.0[L]  /  TBili  x   /  DBili  x   /  AST  x   /  ALT  x   /  AlkPhos  x   03-04      Urinalysis Basic - ( 04 Mar 2025 06:18 )    Color: x / Appearance: x / SG: x / pH: x  Gluc: 257 mg/dL / Ketone: x  / Bili: x / Urobili: x   Blood: x / Protein: x / Nitrite: x   Leuk Esterase: x / RBC: x / WBC x   Sq Epi: x / Non Sq Epi: x / Bacteria: x

## 2025-03-05 NOTE — CHART NOTE - NSCHARTNOTEFT_GEN_A_CORE
At this time goals are established, see [facility] note  Referred to child life, left contact # with wife.   Will sign off, reconsult as needed.

## 2025-03-05 NOTE — PROGRESS NOTE ADULT - ASSESSMENT
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 Patent recently admitted in 6/2024 for left pontine and cerebellar CVA requiring tracheostomy. While at [facility] patient decannulated and passed SS with advanced diet to easy to chew with thin liquids, but complicated COVID requiring transfer to [facility] in 7/2024 and then ultimately discharged home. Per wife patient was doing well at home until 10/2024 when he was found with RLL with concern for aspiration requiring intubation, then tracheostomy, and ultimately discharged to NH with vent dependence in 11/2024. Patient now presented for RUE fistulogram and angioplasty with vascular, however course complicated by leukocytosis with concern for recurrent trachitis vs PNA and UTI, AOCD and hyperglycemia. Admitted to RCU for further management. Found with  PSA trachitis/ PNA and enterococcus faecium UTI. Course complicated by RUE brachial DVT and hypoxia with HD with concern for volume down vs PE?      NEUROLOGY  # Poor mentation second to metabolic encephalopathy vs psychosomatic/ depression   - Hx of L cerebellar and pontene embolic CVA x 2 with residual R hemiplegia   - AOx0, bedbound, and nonverbal at baseline per report, however per exam patient able to open eyes, able to follow commands on left (LUE>LLE) with SEVERE weakness, however noted with R hemiplegia per baseline  - Nods yes and no occasionally however keeps eyes closed likely psychosomatic vs metabolic encephalopathy with infections?   - Last CTH in 10/2024 with no acute findings   - Hold [facility] CT for now   - Monitor mentation     CARDIOVASCULAR  # Hx of HTN and HLD  - Continue on hydral 50 (increased 3/2) and norvasc 10   - Monitor BP     # Incidental Pericardial effusion   - Incidental small pericardial effusion seen adjacent to right ventricle, however IVC appears flat and LE without edema with low concern for RV failure.   - Prior TTE reviewed from 4/2024 with normal RVLVSF with no pericardial effusion noted at the time.   - TTE 2/23 with EF 65, normal LVRVSF, mild LAD, normal RA, mild pulmonary hypertension, and small pericardial effusion noted adjacent to the anterior right ventricle with no echocardiographic evidence of tamponade  - Monitor hemodynamics     RESPIRATORY  # Respiratory failure second to PNA vs volume down vs PE?   - Hx of COPD with chronic respiratory failure with tracheostomy and vent dependence  - Admitted for angioplasty of RUE AVF, however course complicated by leukocytosis with concern for recurrent trachitis/ PNA.   - Course complicated by hypoxia during HD likely volume down vs migration of RUE brachial DVT with PE?   - Held volume removal, bolus given, and hypoxia improved.   - CTA CHEST without PE  - Course complicated by LLL mucous plug and IPV started with improvement   - Continue on albuterol, NS 0.9 and IPV  - Continue on vent 15/400/6/40  - Hold PS for now    HEENT   # Hemoptysis   - Wife reported hemoptysis while at nursing home 2 weeks prior to admission   - No hemoptysis noted on admission   - Hemoptysis returned in house   - CTA CHEST 2/24 without PE  - Bronch 2/26 with no evidence of bleeding   - Bleeding resolved     GI  # Dysphagia with PEG   - Passed SS with advanced diet to easy to chew with thin liquids in 7/2024, however since recurrent aspiration in 10/2024 now with PEG/ vent dependence   - Continue on PEG TF and changed to bolus feeds   - Monitor tolerance     RENAL  # ESRD on HD   # AVF trouble  # Dehydration   - Presented for RUE fistulogram and angioplasty with vascular on 2/18   - Resumed on HD with no flow issue from AVF   - Course complicated by hypoxia with concern for volume down given small IVC and elevated lactate on 2/21  - Volume removal held, lactate improved, and hypoxia improved.   - Monitor volume status and continue on HD MWF in house per renal   - Return to HD MTRF outpatient   - Monitor renal function, electrolytes and UOP     # Hyponatremia likely volume down   - Concern for volume issues, however overall appears volume down with serum osmo 307  - Sodium 128 and improved with HD/ bolus during HD.   - Trend sodium with HD for now     # Elevated lactate   - Lactate elevated likely second to volume down?   - Post bolus lactate trending down from 3.3 to 2.7 to 1.3    INFECTIOUS DISEASE  # Recurrent LLL PNA   - CXR with LLL mucous plug   - POCUS with LLL consolidation vs atelectasis   - Recent BAL with PSA as below   - FULL RVP negative  - Fevers returned and zosyn (3/3 - ) started empirically   - RPT BCx NGTD and SCx with PSO    # Hemoptysis   - Hemoptysis as above   - BAL with PSA as prior   - No fever and monitor off ABX     # Trachitis vs PNA/ UTI   - Hx of steno and PSA in sputum   - Flu/ COVID negative   - MRSA negative   - UCx with enterococcus   - SCx with  PSA   - Found with leukocytosis and started on cefepime and christiano (2/19) and vanco on HD days (2/21).   - No further steno seen in sputum and continue on cefepime (2/19 - 2/25) and vanco on HD days (2/21, 2/22 - 2/25)   - WBC increased likely reactive to hypoxic event and now improving  - ID following observe off abx     # PPX   - MRSA PCR negative  - Candida auris PCR POSITIVE  - Continue on isolation precautions per IC    HEME  # AOCD   - Presented for angioplasty and found with anemia to 6.7 s/p 1U PRBC 2/19 and 1/2 PRBC 2/23   - Anemia panel with concern for AOCD   - Continue on EPO QIW and Fe   - Monitor HH     # Heparin resistance?   - PTT persistently low despite increasing heparin GTT   - Resume on heparin GTT and anti Xa level supra therapeutic on but PTT remained low  - Patient ultimately with concern for heparin resistance    VASCULAR  # RUE DVT   - RUE edema noted with age-indeterminate, non-occlusive deep vein thrombosis noted in the right brachial vein.   - Case discussed with vascular who recommends full AC   - CTH from prior with encephalomalcia, however no hx of intracranial bleed   - Prior UGIB while on AC, however discharged on eliquis in 7/2024 and completed 6 month course for RLE DVT   - Continue on heparin GTT, however held for hemoptysis and resistance   - Continued on argatroban with good tolerance   - Change to eliquis 3/3     ENDOCRINE  # DM2 A1C 14.0 (1/2024) > 6.4 (2/2025)  - Presented with hyperglycemia and continued on lantus and ISS   - Increased lantus, however FS continues to trend in 200s and changed to NPH with improvement.  - TF interrupted for bronchoscopy and adjustment to bolus feeds and FS dropped.   - NPH stopped and FS improved, however trended back up with continuation of tube feeds.   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 Patent recently admitted in 6/2024 for left pontine and cerebellar CVA requiring tracheostomy. While at [facility] patient decannulated and passed SS with advanced diet to easy to chew with thin liquids, but complicated COVID requiring transfer to [facility] in 7/2024 and then ultimately discharged home. Per wife patient was doing well at home until 10/2024 when he was found with RLL with concern for aspiration requiring intubation, then tracheostomy, and ultimately discharged to NH with vent dependence in 11/2024. Patient now presented for RUE fistulogram and angioplasty with vascular, however course complicated by leukocytosis with concern for recurrent trachitis vs PNA and UTI, AOCD and hyperglycemia. Admitted to RCU for further management. Found with  PSA trachitis/ PNA and enterococcus faecium UTI. Course complicated by RUE brachial DVT and hypoxia with HD with concern for volume down vs PE?      NEUROLOGY  # Poor mentation second to metabolic encephalopathy vs psychosomatic/ depression   - Hx of L cerebellar and pontene embolic CVA x 2 with residual R hemiplegia   - AOx0, bedbound, and nonverbal at baseline per report, however per exam patient able to open eyes, able to follow commands on left (LUE>LLE) with SEVERE weakness, however noted with R hemiplegia per baseline  - Nods yes and no occasionally however keeps eyes closed likely psychosomatic vs metabolic encephalopathy with infections?   - Last CTH in 10/2024 with no acute findings   - Hold [facility] CT for now   - Monitor mentation     CARDIOVASCULAR  # Hx of HTN and HLD  - Continue on hydral 50 (increased 3/2) and norvasc 10   - Monitor BP     # Incidental Pericardial effusion   - Incidental small pericardial effusion seen adjacent to right ventricle, however IVC appears flat and LE without edema with low concern for RV failure.   - Prior TTE reviewed from 4/2024 with normal RVLVSF with no pericardial effusion noted at the time.   - TTE 2/23 with EF 65, normal LVRVSF, mild LAD, normal RA, mild pulmonary hypertension, and small pericardial effusion noted adjacent to the anterior right ventricle with no echocardiographic evidence of tamponade  - Monitor hemodynamics     RESPIRATORY  # Respiratory failure second to PNA vs volume down vs PE?   - Hx of COPD with chronic respiratory failure with tracheostomy and vent dependence  - Admitted for angioplasty of RUE AVF, however course complicated by leukocytosis with concern for recurrent trachitis/ PNA.   - Course complicated by hypoxia during HD likely volume down vs migration of RUE brachial DVT with PE?   - Held volume removal, bolus given, and hypoxia improved.   - CTA CHEST without PE  - Course complicated by LLL mucous plug and IPV started with improvement   - Continue on albuterol, NS 0.9 and IPV  - Continue on vent 15/400/6/40  - Hold PS for now    HEENT   # Hemoptysis   - Wife reported hemoptysis while at nursing home 2 weeks prior to admission   - No hemoptysis noted on admission   - Hemoptysis returned in house   - CTA CHEST 2/24 without PE  - Bronch 2/26 with no evidence of bleeding   - Bleeding resolved     GI  # Dysphagia with PEG   - Passed SS with advanced diet to easy to chew with thin liquids in 7/2024, however since recurrent aspiration in 10/2024 now with PEG/ vent dependence   - Continue on PEG TF and changed to bolus feeds   - Monitor tolerance     RENAL  # ESRD on HD   # AVF trouble  # Dehydration   - Presented for RUE fistulogram and angioplasty with vascular on 2/18   - Resumed on HD with no flow issue from AVF   - Course complicated by hypoxia with concern for volume down given small IVC and elevated lactate on 2/21  - Volume removal held, lactate improved, and hypoxia improved.   - Monitor volume status and continue on HD MWF in house per renal   - Return to HD MTRF outpatient   - Monitor renal function, electrolytes and UOP     # Hyponatremia likely volume down   - Concern for volume issues, however overall appears volume down with serum osmo 307  - Sodium 128 and improved with HD/ bolus during HD.   - Trend sodium with HD for now     # Elevated lactate   - Lactate elevated likely second to volume down?   - Post bolus lactate trending down from 3.3 to 2.7 to 1.3    INFECTIOUS DISEASE  # Recurrent LLL PNA   - CXR with LLL mucous plug   - POCUS with LLL consolidation vs atelectasis   - Recent BAL with PSA as below   - FULL RVP negative  - Fevers returned and zosyn (3/3 - ) started empirically   - Zosyn dose increased 3/5  - RPT BCx NGTD and SCx with PSO    # Hemoptysis   - Hemoptysis as above   - BAL with PSA as prior   - No fever and monitor off ABX     # Trachitis vs PNA/ UTI   - Hx of steno and PSA in sputum   - Flu/ COVID negative   - MRSA negative   - UCx with enterococcus   - SCx with  PSA   - Found with leukocytosis and started on cefepime and christiano (2/19) and vanco on HD days (2/21).   - No further steno seen in sputum and continue on cefepime (2/19 - 2/25) and vanco on HD days (2/21, 2/22 - 2/25)   - WBC increased likely reactive to hypoxic event and now improving  - ID following observe off abx     # PPX   - MRSA PCR negative  - Candida auris PCR POSITIVE  - Continue on isolation precautions per IC    HEME  # AOCD   - Presented for angioplasty and found with anemia to 6.7 s/p 1U PRBC 2/19 and 1/2 PRBC 2/23   - Anemia panel with concern for AOCD   - Continue on EPO QIW and Fe   - Monitor HH     # Heparin resistance?   - PTT persistently low despite increasing heparin GTT   - Resume on heparin GTT and anti Xa level supra therapeutic on but PTT remained low  - Patient ultimately with concern for heparin resistance    VASCULAR  # RUE DVT   - RUE edema noted with age-indeterminate, non-occlusive deep vein thrombosis noted in the right brachial vein.   - Case discussed with vascular who recommends full AC   - CTH from prior with encephalomalcia, however no hx of intracranial bleed   - Prior UGIB while on AC, however discharged on eliquis in 7/2024 and completed 6 month course for RLE DVT   - Continue on heparin GTT, however held for hemoptysis and resistance   - Continued on argatroban with good tolerance   - Change to eliquis 3/3     ENDOCRINE  # DM2 A1C 14.0 (1/2024) > 6.4 (2/2025)  - Presented with hyperglycemia and continued on lantus and ISS   - Increased lantus, however FS continues to trend in 200s and changed to NPH with improvement.  - TF interrupted for bronchoscopy and adjustment to bolus feeds and FS dropped.   - NPH stopped and FS improved, however trended back up with continuation of tube feeds.   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 Patent recently admitted in 6/2024 for left pontine and cerebellar CVA requiring tracheostomy. While at [facility] patient decannulated and passed SS with advanced diet to easy to chew with thin liquids, but complicated COVID requiring transfer to [facility] in 7/2024 and then ultimately discharged home. Per wife patient was doing well at home until 10/2024 when he was found with RLL with concern for aspiration requiring intubation, then tracheostomy, and ultimately discharged to NH with vent dependence in 11/2024. Patient now presented for RUE fistulogram and angioplasty with vascular, however course complicated by leukocytosis with concern for recurrent trachitis vs PNA and UTI, AOCD and hyperglycemia. Admitted to RCU for further management. Found with  PSA trachitis/ PNA and enterococcus faecium UTI. Course complicated by RUE brachial DVT and hypoxia with HD with concern for volume down vs PE?      NEUROLOGY  # Poor mentation second to metabolic encephalopathy vs psychosomatic/ depression   - Hx of L cerebellar and pontene embolic CVA x 2 with residual R hemiplegia   - AOx0, bedbound, and nonverbal at baseline per report, however per exam patient able to open eyes, able to follow commands on left (LUE>LLE) with SEVERE weakness, however noted with R hemiplegia per baseline  - Nods yes and no occasionally however keeps eyes closed likely psychosomatic vs metabolic encephalopathy with infections?   - Last CTH in 10/2024 with no acute findings   - Hold [facility] CT for now   - Monitor mentation     CARDIOVASCULAR  # Hx of HTN and HLD  - Continue on hydral 50 (increased 3/2) and norvasc 10   - Monitor BP     # Incidental Pericardial effusion   - Incidental small pericardial effusion seen adjacent to right ventricle, however IVC appears flat and LE without edema with low concern for RV failure.   - Prior TTE reviewed from 4/2024 with normal RVLVSF with no pericardial effusion noted at the time.   - TTE 2/23 with EF 65, normal LVRVSF, mild LAD, normal RA, mild pulmonary hypertension, and small pericardial effusion noted adjacent to the anterior right ventricle with no echocardiographic evidence of tamponade  - Monitor hemodynamics     RESPIRATORY  # Respiratory failure second to PNA vs volume down vs PE?   - Hx of COPD with chronic respiratory failure with tracheostomy and vent dependence  - Admitted for angioplasty of RUE AVF, however course complicated by leukocytosis with concern for recurrent trachitis/ PNA.   - Course complicated by hypoxia during HD likely volume down vs migration of RUE brachial DVT with PE?   - Held volume removal, bolus given, and hypoxia improved.   - CTA CHEST without PE  - Course complicated by LLL mucous plug and IPV started with improvement   - Continue on albuterol, NS 0.9 and IPV  - Continue on vent 15/400/6/40  - Hold PS for now    HEENT   # Hemoptysis   - Wife reported hemoptysis while at nursing home 2 weeks prior to admission   - No hemoptysis noted on admission   - Hemoptysis returned in house   - CTA CHEST 2/24 without PE  - Bronch 2/26 with no evidence of bleeding   - Bleeding resolved     GI  # Dysphagia with PEG   - Passed SS with advanced diet to easy to chew with thin liquids in 7/2024, however since recurrent aspiration in 10/2024 now with PEG/ vent dependence   - Continue on PEG TF and changed to bolus feeds   - Monitor tolerance     RENAL  # ESRD on HD   # AVF trouble  # Dehydration   - Presented for RUE fistulogram and angioplasty with vascular on 2/18   - Resumed on HD with no flow issue from AVF   - Course complicated by hypoxia with concern for volume down given small IVC and elevated lactate on 2/21  - Volume removal held, lactate improved, and hypoxia improved.   - Monitor volume status and continue on HD MWF in house per renal   - Return to HD MTRF outpatient   - Monitor renal function, electrolytes and UOP     # Hyponatremia likely volume down   - Concern for volume issues, however overall appears volume down with serum osmo 307  - Sodium 128 and improved with HD/ bolus during HD.   - Trend sodium with HD for now     # Elevated lactate   - Lactate elevated likely second to volume down?   - Post bolus lactate trending down from 3.3 to 2.7 to 1.3    INFECTIOUS DISEASE  # Recurrent LLL PNA   - CXR with LLL mucous plug   - POCUS with LLL consolidation vs atelectasis   - Recent BAL with PSA as below   - FULL RVP negative  - Fevers returned and zosyn (3/3 - ) started empirically   - Zosyn dose increased 3/5 to 4.5 g Q6H   - RPT BCx NGTD and SCx with PSO    # Hemoptysis   - Hemoptysis as above   - BAL with PSA as prior   - No fever and monitor off ABX     # Trachitis vs PNA/ UTI   - Hx of steno and PSA in sputum   - Flu/ COVID negative   - MRSA negative   - UCx with enterococcus   - SCx with  PSA   - Found with leukocytosis and started on cefepime and christiano (2/19) and vanco on HD days (2/21).   - No further steno seen in sputum and continue on cefepime (2/19 - 2/25) and vanco on HD days (2/21, 2/22 - 2/25)   - WBC increased likely reactive to hypoxic event and now improving  - ID following observe off abx     # PPX   - MRSA PCR negative  - Candida auris PCR POSITIVE  - Continue on isolation precautions per IC    HEME  # AOCD   - Presented for angioplasty and found with anemia to 6.7 s/p 1U PRBC 2/19 and 1/2 PRBC 2/23   - Anemia panel with concern for AOCD   - Continue on EPO QIW and Fe   - Monitor HH     # Heparin resistance?   - PTT persistently low despite increasing heparin GTT   - Resume on heparin GTT and anti Xa level supra therapeutic on but PTT remained low  - Patient ultimately with concern for heparin resistance    VASCULAR  # RUE DVT   - RUE edema noted with age-indeterminate, non-occlusive deep vein thrombosis noted in the right brachial vein.   - Case discussed with vascular who recommends full AC   - CTH from prior with encephalomalcia, however no hx of intracranial bleed   - Prior UGIB while on AC, however discharged on eliquis in 7/2024 and completed 6 month course for RLE DVT   - Continue on heparin GTT, however held for hemoptysis and resistance   - Continued on argatroban with good tolerance   - Change to eliquis 3/3     ENDOCRINE  # DM2 A1C 14.0 (1/2024) > 6.4 (2/2025)  - Presented with hyperglycemia and continued on lantus and ISS   - Increased lantus, however FS continues to trend in 200s and changed to NPH with improvement.  - TF interrupted for bronchoscopy and adjustment to bolus feeds and FS dropped.   - NPH stopped and FS improved, however trended back up with continuation of tube feeds.   - NPH resumed, however FS continues to waxe and wane and case discussed with endocrine   - Insulin changed back to lantus 12 and lispro 4 with ISS for now   - Monitor FS and adjust as needed  - Endocrine following     ETHICS/ GOC    - FULL CODE   - Wife wishes for palliative discussion pending    DISPO - Back to NH when able

## 2025-03-05 NOTE — GOALS OF CARE CONVERSATION - ADVANCED CARE PLANNING - CONVERSATION DETAILS
This telephonic visit was provided via audio only technology. The patient Megha Art was located at [facility], [place] [address], at the time of the visit.  The provider Dr. Banuelos, was located at [facility], [place] [address], at the time of the visit. The patients next of kin Haley participated in the telephonic visit.   Verbal consent for telephonic services was given on 3/5/25 by next of kin wife Pauline Art.  I have spent 18 minutes with the next of kin on the telephone.     Addressed advanced directives in the event of cardiac arrest or respiratory failure. Options discussed in the setting of advanced illness. Including option for allowing a natural passing (DNR/DNI) and adding medications for comfort if any symptoms might arise.   Wife shared that at this time family would want pt to be resuscitated (full code), they might consider allowing a natural passing if things changed. Currently they want to continue with all medical care including MV.  Offered child life services for grandkids (9yo and 15yo) for additional support as well as caregiver support.   Questions answered. Support provided.

## 2025-03-05 NOTE — PROGRESS NOTE ADULT - SUBJECTIVE AND OBJECTIVE BOX
[facility] [place] [facility]OLOGY [facility]ACT[facility]   MD ELIANE BHAGAT MD RUORU WONG, PA    TEL:  FROM 9 AM to 5 PM ---OFFICE: [phone]  AVAILABLE ON TEAMS    FROM 5 PM - 9 AM PLEASE CALL ANSWERING SERVICE: [phone]    RENAL FOLLOW UP NOTE--Date of Service 03-05-25 @ 11:54  --------------------------------------------------------------------------------  HPI:      Pt seen and examined at bedside.       PAST HISTORY  --------------------------------------------------------------------------------  No significant changes to PMH, PSH, FHx, SHx, unless otherwise noted    ALLERGIES & MEDICATIONS  --------------------------------------------------------------------------------  Allergies    No Known Allergies    Intolerances      Standing Inpatient Medications  albuterol    0.083% 2.5 milliGRAM(s) Nebulizer every 6 hours  amLODIPine   Tablet 10 milliGRAM(s) Oral daily  apixaban 5 milliGRAM(s) Oral every 12 hours  AQUAPHOR (petrolatum Ointment) 1 Application(s) Topical two times a day  atorvastatin 20 milliGRAM(s) Oral at bedtime  chlorhexidine 0.12% Liquid 15 milliLiter(s) Oral Mucosa every 12 hours  chlorhexidine 2% Cloths 1 Application(s) Topical daily  dextrose 5%. 1000 milliLiter(s) IV Continuous <Continuous>  dextrose 5%. 1000 milliLiter(s) IV Continuous <Continuous>  dextrose 50% Injectable 25 Gram(s) IV Push once  dextrose 50% Injectable 12.5 Gram(s) IV Push once  dextrose 50% Injectable 25 Gram(s) IV Push once  epoetin reilly-epbx (RETACRIT) Injectable 06050 Unit(s) IV Push <User Schedule>  ferrous    sulfate Liquid 300 milliGRAM(s) Enteral Tube daily  glucagon  Injectable 1 milliGRAM(s) IntraMuscular once  hydrALAZINE 50 milliGRAM(s) Oral three times a day  insulin glargine Injectable (LANTUS) 12 Unit(s) SubCutaneous at bedtime  insulin lispro (ADMELOG) corrective regimen sliding scale   SubCutaneous every 6 hours  insulin lispro Injectable (ADMELOG) 4 Unit(s) SubCutaneous every 6 hours  Nephro-noam 1 Tablet(s) Oral daily  pantoprazole   Suspension 40 milliGRAM(s) Oral before breakfast  piperacillin/tazobactam IVPB.. 3.375 Gram(s) IV Intermittent every 12 hours  sodium chloride 0.9% for Nebulization 3 milliLiter(s) Nebulizer every 6 hours    PRN Inpatient Medications  dextrose Oral Gel 15 Gram(s) Oral once PRN  sodium chloride 0.9% Bolus. 100 milliLiter(s) IV Bolus every 5 minutes PRN      REVIEW OF SYSTEMS  --------------------------------------------------------------------------------  General: no fever    MSK: no edema     VITALS/PHYSICAL EXAM  --------------------------------------------------------------------------------  T(C): 35.8 (03-05-25 @ 11:35), Max: 36.7 (03-05-25 @ 00:00)  HR: 79 (03-05-25 @ 11:35) (74 - 94)  BP: 156/58 (03-05-25 @ 11:35) (136/57 - 189/72)  RR: 17 (03-05-25 @ 11:35) (17 - 23)  SpO2: 100% (03-05-25 @ 11:32) (98% - 100%)  Wt(kg): --        03-04-25 @ 07:01  -  03-05-25 @ 07:00  --------------------------------------------------------  IN: 700 mL / OUT: 100 mL / NET: 600 mL      Physical Exam:  	General: NAD  Neck: No JVD  Respiratory: trach to vent; diminished  Cardiovascular: S1, S2, RRR  Gastrointestinal: BS+, soft, NT/ND  Extremities: No peripheral edema    LABS/STUDIES  --------------------------------------------------------------------------------    132  |  90  |  54  ----------------------------<  257      [03-04-25 @ 06:18]  4.2   |  27  |  2.80        Ca     9.4     [03-04-25 @ 06:18]      Mg     2.80     [03-04-25 @ 06:18]      Phos  2.6     [03-04-25 @ 06:18]    TPro  x   /  Alb  3.0  /  TBili  x   /  DBili  x   /  AST  x   /  ALT  x   /  AlkPhos  x   [03-04-25 @ 06:18]          Creatinine Trend:  SCr 2.80 [03-04 @ 06:18]  SCr 2.24 [03-03 @ 16:35]  SCr 3.49 [03-03 @ 07:00]  SCr 2.98 [03-02 @ 09:09]  SCr 2.74 [03-02 @ 01:38]    Urinalysis - [03-04-25 @ 06:18]      Color  / Appearance  / SG  / pH       Gluc 257 / Ketone   / Bili  / Urobili        Blood  / Protein  / Leuk Est  / Nitrite       RBC  / WBC  / Hyaline  / Gran  / Sq Epi  / Non Sq Epi  / Bacteria       Iron 46, TIBC 142, %sat 32      [02-19-25 @ 11:39]  Ferritin 3601      [02-19-25 @ 11:39]  PTH -- (Ca --)      [05-26-24 @ 01:20]   122  TSH 1.660      [10-05-24 @ 08:37]  Lipid: chol --, , HDL --, LDL --      [10-18-24 @ 06:00]    HBsAb 654.8      [02-19-25 @ 19:05]  HBsAg Nonreact      [02-19-25 @ 19:05]  HBcAb Nonreact      [02-19-25 @ 19:05]  HCV 0.19, Nonreact      [02-19-25 @ 19:05]

## 2025-03-05 NOTE — PROGRESS NOTE ADULT - ASSESSMENT
74-year-old male hx trach/vent - , CVA, COPD, stage renal disease on dialysis 4 times a week (MTRF) history of pressure ulcer.  Patient presents the ED today for elevated white count. nephrology consulted for dialysis needs    [facility]RD:  [facility]  On HD MTTsF via an A-V fistula. s/p fistulogram by vascular 2/18  Continue MWF in hospital  consent from wife in dialysis unit  HD today  monitor bmp    Hypertension  suboptimal  continue norvasc 10 daily and hydralazine increased to 50 tid 3/2  titrate hydralazine to 100mg TID if needed  max uf as tolerated  monitor    Anemia:  Transfuse for Hg < 7.  s/p 1 unit PRBC 2/23  epo with hd  iron sat >20  monitor    leukocytosis  sepsis work up per team    hyponatremia  in setting of esrd and hyperglycemia  optimize dm control  hd per schedule with uF  monitor

## 2025-03-05 NOTE — CHART NOTE - NSCHARTNOTEFT_GEN_A_CORE
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 Patent recently admitted in 6/2024 for left pontine and cerebellar CVA requiring tracheostomy.      DIET PRESCRIPTION:  Diet, NPO with Tube Feed:   Tube Feeding Modality: Gastrostomy  Nepro with Carb Steady (NEPRORTH)  Total Volume for 24 Hours (mL): 1160  Bolus  Total Volume of Bolus (mL):  290  Total # of Feeds: 4  Tube Feed Frequency: Every 6 hours   Tube Feed Start Time: 12:00  Bolus Feed Rate (mL per Hour): 290   Bolus Feed Duration (in Hours): 0  Liquid Protein Supplement     Qty per Day:  1 (02-25-25 @ 13:43)      NUTRITION COURSE:  Unable to conduct nutrition interview 2/2 decreased cognition. Information obtained from comprehensive chart review and per RN today: No reports of any recent episodes of nausea, vomiting, diarrhea or constipation, Last BM noted on 2/25 per RN flowsheets. Pt remains NPO TF only as sole source of nutrition and hydration; (IBW: 69kg) Nepro @ 290ml 4x/day (1160ml total volume, 2088 kcal (30kcal/kg), 94 gm protein (1.3gm/kg), 824ml free water from formula). Pt continues to receive LPS 1x/day to provide additional kcal/protein needs and promote wound healing.       PERTINENT MEDICATIONS:  MEDICATIONS  (STANDING):  albuterol    0.083% 2.5 milliGRAM(s) Nebulizer every 6 hours  amLODIPine   Tablet 10 milliGRAM(s) Oral daily  apixaban 5 milliGRAM(s) Oral every 12 hours  AQUAPHOR (petrolatum Ointment) 1 Application(s) Topical two times a day  atorvastatin 20 milliGRAM(s) Oral at bedtime  chlorhexidine 0.12% Liquid 15 milliLiter(s) Oral Mucosa every 12 hours  chlorhexidine 2% Cloths 1 Application(s) Topical daily  dextrose 5%. 1000 milliLiter(s) (100 mL/Hr) IV Continuous <Continuous>  dextrose 5%. 1000 milliLiter(s) (50 mL/Hr) IV Continuous <Continuous>  dextrose 50% Injectable 25 Gram(s) IV Push once  dextrose 50% Injectable 12.5 Gram(s) IV Push once  dextrose 50% Injectable 25 Gram(s) IV Push once  epoetin reilly-epbx (RETACRIT) Injectable 32013 Unit(s) IV Push <User Schedule>  ferrous    sulfate Liquid 300 milliGRAM(s) Enteral Tube daily  glucagon  Injectable 1 milliGRAM(s) IntraMuscular once  hydrALAZINE 50 milliGRAM(s) Oral three times a day  insulin glargine Injectable (LANTUS) 12 Unit(s) SubCutaneous at bedtime  insulin lispro (ADMELOG) corrective regimen sliding scale   SubCutaneous every 6 hours  insulin lispro Injectable (ADMELOG) 4 Unit(s) SubCutaneous every 6 hours  Nephro-noam 1 Tablet(s) Oral daily  pantoprazole   Suspension 40 milliGRAM(s) Oral before breakfast  piperacillin/tazobactam IVPB.. 4.5 Gram(s) IV Intermittent every 6 hours  sodium chloride 0.9% for Nebulization 3 milliLiter(s) Nebulizer every 6 hours    MEDICATIONS  (PRN):  dextrose Oral Gel 15 Gram(s) Oral once PRN Blood Glucose LESS THAN 70 milliGRAM(s)/deciliter  sodium chloride 0.9% Bolus. 100 milliLiter(s) IV Bolus every 5 minutes PRN SBP LESS THAN or EQUAL to 80 mmHg    [x] Relevant notes on medications:  Continue to provide nephrovite and ferrous sulfate for micronutrient and mineral provisions     PERTINENT LABS:  03-05 Na129 mmol/L[L] Glu 202 mg/dL[H] K+ 4.0 mmol/L Cr  3.47 mg/dL[H] BUN 70 mg/dL[H] 03-05 Phos 3.4 mg/dL 03-04 Alb 3.0 g/dL[L]  A1C with Estimated Average Glucose Result: 6.4 % (02-19-25 @ 06:45)  A1C with Estimated Average Glucose Result: 4.8 % (10-05-24 @ 08:37)    POCT Blood Glucose.: 174 mg/dL (05 Mar 2025 12:08)  POCT Blood Glucose.: 153 mg/dL (05 Mar 2025 06:13)  POCT Blood Glucose.: 214 mg/dL (04 Mar 2025 23:10)  POCT Blood Glucose.: 76 mg/dL (04 Mar 2025 21:35)  POCT Blood Glucose.: 233 mg/dL (04 Mar 2025 17:26)    [x] Relevant notes on labs: elevated BUN/Cr and glucose levels- continues on insulin regimen and therapeutic formula.     ANTHROPOMETRICS:  Height (cm): 162.6 (02-18 @ 14:16),  Weight (kg): 53.8 (02-19 @ 12:20)  BMI (kg/m2): 20.3 (02-19 @ 12:20)  Weight Assessment:    PHYSICAL ASSESSMENT, per flowsheets:  Edema:  Pressure Injury:      ESTIMATED NEEDS:  [X] No change since previous assessment, based on  lb, 69.8 kg  25-30 kcal/kg, 8020-1987 kcal/d, 1.4-1.6 gm protein/kg,  gm/d      PREVIOUS NUTRITION DX: [x ] Malnutrition   Nutrition Diagnosis is [x ] ongoing  [ ] resolved [ ] not applicable   New Nutrition Diagnosis: [ x] not applicable     EDUCATION:  [x ] Not applicable secondary to decreased cognition      RECOMMENDATIONS/INTERVENTIONS:  1) Continue on current TF regimen: Nepro @ 290ml 4x/day (1160ml total volume, 2088 kcal, 94 gm protein, 824ml free water from formula).   2) continue on nephrovite and ferrous sulfate for micronutrient and mineral provisions   3) Continue on LPS 1x/day (15gm protein).   4) RD to f/u prn     MONITORING & EVALUATING:  Tolerance to diet/supplement, nutrition related lab values, weight trends, BMs/GI distress, hydration status, skin integrity.    Emerald Yu, MS, RDN | Available on MS TEAMS | Office #[phone]

## 2025-03-05 NOTE — PROGRESS NOTE ADULT - ASSESSMENT
75 yo M with hx trach/vent (last changed 10/23/24), CVA x2 with residual R hemiplegia, COPD, ESRD on HD MWF (last 2/17), pressure ulcer presenting to [facility] ED for leukocytosis. Patient was intially up in the cath lab earlier today with vascular surgery for fistulogram and noted to have a WBC of 18 and sent to ED. Patient nonverbal at baseline, history provided by son. Per wife, patient at normal baseline, somewhat able to make needs known and is not complaining of any pain. . Endocrinology was consulted for management of diabetes mellitus type 2- pt on TF with hyperglycemia.     #Type 2 Diabetes Mellitus  - HbA1c 6.4  ; home regimen: pt on basal/bolus and on TF outpt- as per wife pt on humalog 2 units TID and flextouch pen (tresiba?) 6 units at bedtime when pt was home- pt currently from [employer]- please confirm what [employer] has been doing with regards to pt insulin regimen.  -egfr: 23    Plan:   - FS goal 100-180: FS at goal today.   - Pt getting TF nepro carb stead bolus feeds of 290ml q 6 hours via PEG- pt was on NPH q 6 hours  - Continue 12 units of lantus QHS tonight  - Continue 4 units of Admelog q 6 hours (give prior to giving tube feed bolus). hold if holding TF- START at 12pm today.   - Continue low Admelog correction scale q 6 hours - will monitor FS to see if need to increase to moderate scale  - Please check FSG before q6h while on TF and if NPO  - Please update endocrine if any changes to tube feeding - if no longer bolus or if rate changes.   - RD consult  - hypoglycemia orderset prn  - extensively discussed importance of glycemic control to prevent micro- and macrovascular complications  - discussed importance of weight loss, exercise, and changing diet   - reviewed symptoms and management of hypoglycemia  - reviewed basics of insulin administration and pharmacokinetics, glucometer monitoring, as well as glycemic goals for outpatient setting  - Discharge planning: If continues bolus feeds on discharge then pt will continue basal/bolus regimen doses TBD on dc.     #Hypertension  - BP goal <130/80  -elevated BP  -Pt on norvasc and hydralazine  - Management as per primary team    #Hyperlipidemia  - check fasting lipid panel  -goal LDL <70  Plan:   - statin: on lipitor 20mg at bedtime via PEG    #ESRD on HD  renal following  monitor electrolytes    Bhupendra Hollis, [facility]  Nurse Practitioner  Division of Endocrinology & Diabetes  contact on teams    If before 9AM or after 5PM, or on weekends/holidays, please call the Endocrine answering service for assistance ([phone]).  For nonurgent matters, please email [email or web address] for assistance.

## 2025-03-05 NOTE — PROGRESS NOTE ADULT - SUBJECTIVE AND OBJECTIVE BOX
Chief Complaint: Type 2 DM    History: saw pt at bedside, was getting bedside HD. pt trach on vent and bolus tube feeds via PEG tube. No overnights events. FS at goal today.     MEDICATIONS  (STANDING):  albuterol    0.083% 2.5 milliGRAM(s) Nebulizer every 6 hours  amLODIPine   Tablet 10 milliGRAM(s) Oral daily  apixaban 5 milliGRAM(s) Oral every 12 hours  AQUAPHOR (petrolatum Ointment) 1 Application(s) Topical two times a day  atorvastatin 20 milliGRAM(s) Oral at bedtime  chlorhexidine 0.12% Liquid 15 milliLiter(s) Oral Mucosa every 12 hours  chlorhexidine 2% Cloths 1 Application(s) Topical daily  dextrose 5%. 1000 milliLiter(s) (100 mL/Hr) IV Continuous <Continuous>  dextrose 5%. 1000 milliLiter(s) (50 mL/Hr) IV Continuous <Continuous>  dextrose 50% Injectable 25 Gram(s) IV Push once  dextrose 50% Injectable 12.5 Gram(s) IV Push once  dextrose 50% Injectable 25 Gram(s) IV Push once  epoetin reilly-epbx (RETACRIT) Injectable 37265 Unit(s) IV Push <User Schedule>  ferrous    sulfate Liquid 300 milliGRAM(s) Enteral Tube daily  glucagon  Injectable 1 milliGRAM(s) IntraMuscular once  hydrALAZINE 50 milliGRAM(s) Oral three times a day  insulin glargine Injectable (LANTUS) 12 Unit(s) SubCutaneous at bedtime  insulin lispro (ADMELOG) corrective regimen sliding scale   SubCutaneous every 6 hours  insulin lispro Injectable (ADMELOG) 4 Unit(s) SubCutaneous every 6 hours  Nephro-noam 1 Tablet(s) Oral daily  pantoprazole   Suspension 40 milliGRAM(s) Oral before breakfast  piperacillin/tazobactam IVPB.. 4.5 Gram(s) IV Intermittent every 6 hours  sodium chloride 0.9% for Nebulization 3 milliLiter(s) Nebulizer every 6 hours    MEDICATIONS  (PRN):  dextrose Oral Gel 15 Gram(s) Oral once PRN Blood Glucose LESS THAN 70 milliGRAM(s)/deciliter  sodium chloride 0.9% Bolus. 100 milliLiter(s) IV Bolus every 5 minutes PRN SBP LESS THAN or EQUAL to 80 mmHg      Allergies    No Known Allergies    Intolerances      Review of Systems:    UNABLE TO OBTAIN    PHYSICAL EXAM:  VITALS: T(C): 36 (03-05-25 @ 12:00)  T(F): 96.8 (03-05-25 @ 12:00), Max: 98.1 (03-05-25 @ 00:00)  HR: 82 (03-05-25 @ 12:00) (74 - 94)  BP: 163/67 (03-05-25 @ 12:00) (136/57 - 189/72)  RR:  (17 - 23)  SpO2:  (98% - 100%)  Wt(kg): --  GENERAL: NAD  EYES: No proptosis  RESPIRATORY: non labored breathing- trach/ventilator  CARDIOVASCULAR: Regular rate and rhythm  GI: PEG on TF- tolerating bolus feeds      CAPILLARY BLOOD GLUCOSE      POCT Blood Glucose.: 174 mg/dL (05 Mar 2025 12:08)  POCT Blood Glucose.: 153 mg/dL (05 Mar 2025 06:13)  POCT Blood Glucose.: 214 mg/dL (04 Mar 2025 23:10)  POCT Blood Glucose.: 76 mg/dL (04 Mar 2025 21:35)  POCT Blood Glucose.: 233 mg/dL (04 Mar 2025 17:26)      03-05    129[L]  |  89[L]  |  70[H]  ----------------------------<  202[H]  4.0   |  25  |  3.47[H]    eGFR: 18[L]    Ca    9.3      03-05  Mg     2.80     03-05  Phos  3.4     03-05    TPro  x   /  Alb  3.0[L]  /  TBili  x   /  DBili  x   /  AST  x   /  ALT  x   /  AlkPhos  x   03-04          Thyroid Function Tests:        A1C with Estimated Average Glucose Result: 6.4 % (02-19-25 @ 06:45)  A1C with Estimated Average Glucose Result: 4.8 % (10-05-24 @ 08:37)  A1C with Estimated Average Glucose Result: 5.9 % (07-16-24 @ 10:25)  A1C with Estimated Average Glucose Result: 6.9 % (04-30-24 @ 06:03)          Diet, NPO with Tube Feed:   Tube Feeding Modality: Gastrostomy  Nepro with Carb Steady (NEPRORTH)  Total Volume for 24 Hours (mL): 1160  Bolus  Total Volume of Bolus (mL):  290  Total # of Feeds: 4  Tube Feed Frequency: Every 6 hours   Tube Feed Start Time: 12:00  Bolus Feed Rate (mL per Hour): 290   Bolus Feed Duration (in Hours): 0  Liquid Protein Supplement     Qty per Day:  1 (02-25-25 @ 13:43)

## 2025-03-05 NOTE — PROVIDER CONTACT NOTE (OTHER) - BACKGROUND
pt admitted for UTI and elevated white blood count when getting a fistulogram. Transferred to RCU d/t being chronically trach vented. Blood pressure typically on the high side.

## 2025-03-06 LAB
ANION GAP SERPL CALC-SCNC: 15 MMOL/L — HIGH (ref 7–14)
BASOPHILS # BLD AUTO: 0.05 K/UL — SIGNIFICANT CHANGE UP (ref 0–0.2)
BASOPHILS NFR BLD AUTO: 0.4 % — SIGNIFICANT CHANGE UP (ref 0–2)
BUN SERPL-MCNC: 44 MG/DL — HIGH (ref 7–23)
CALCIUM SERPL-MCNC: 9.6 MG/DL — SIGNIFICANT CHANGE UP (ref 8.4–10.5)
CHLORIDE SERPL-SCNC: 91 MMOL/L — LOW (ref 98–107)
CO2 SERPL-SCNC: 28 MMOL/L — SIGNIFICANT CHANGE UP (ref 22–31)
CREAT SERPL-MCNC: 2.47 MG/DL — HIGH (ref 0.5–1.3)
EGFR: 27 ML/MIN/1.73M2 — LOW
EGFR: 27 ML/MIN/1.73M2 — LOW
EOSINOPHIL # BLD AUTO: 0.21 K/UL — SIGNIFICANT CHANGE UP (ref 0–0.5)
EOSINOPHIL NFR BLD AUTO: 1.6 % — SIGNIFICANT CHANGE UP (ref 0–6)
GLUCOSE BLDC GLUCOMTR-MCNC: 172 MG/DL — HIGH (ref 70–99)
GLUCOSE BLDC GLUCOMTR-MCNC: 175 MG/DL — HIGH (ref 70–99)
GLUCOSE BLDC GLUCOMTR-MCNC: 198 MG/DL — HIGH (ref 70–99)
GLUCOSE BLDC GLUCOMTR-MCNC: 218 MG/DL — HIGH (ref 70–99)
GLUCOSE SERPL-MCNC: 157 MG/DL — HIGH (ref 70–99)
HGB BLD-MCNC: 8.6 G/DL — LOW (ref 13–17)
IANC: 10.88 K/UL — HIGH (ref 1.8–7.4)
IMM GRANULOCYTES NFR BLD AUTO: 0.9 % — SIGNIFICANT CHANGE UP (ref 0–0.9)
LYMPHOCYTES # BLD AUTO: 0.89 K/UL — LOW (ref 1–3.3)
LYMPHOCYTES # BLD AUTO: 6.8 % — LOW (ref 13–44)
MAGNESIUM SERPL-MCNC: 2.7 MG/DL — HIGH (ref 1.6–2.6)
MCHC RBC-ENTMCNC: 23.5 PG — LOW (ref 27–34)
MCHC RBC-ENTMCNC: 30.1 G/DL — LOW (ref 32–36)
MCV RBC AUTO: 78.1 FL — LOW (ref 80–100)
MONOCYTES # BLD AUTO: 0.88 K/UL — SIGNIFICANT CHANGE UP (ref 0–0.9)
MONOCYTES NFR BLD AUTO: 6.8 % — SIGNIFICANT CHANGE UP (ref 2–14)
NEUTROPHILS NFR BLD AUTO: 83.5 % — HIGH (ref 43–77)
NRBC # BLD AUTO: 0.16 K/UL — HIGH (ref 0–0)
NRBC # FLD: 0.16 K/UL — HIGH (ref 0–0)
NRBC BLD AUTO-RTO: 1 /100 WBCS — HIGH (ref 0–0)
PHOSPHATE SERPL-MCNC: 2.5 MG/DL — SIGNIFICANT CHANGE UP (ref 2.5–4.5)
PLATELET # BLD AUTO: 306 K/UL — SIGNIFICANT CHANGE UP (ref 150–400)
POTASSIUM SERPL-MCNC: 3.5 MMOL/L — SIGNIFICANT CHANGE UP (ref 3.5–5.3)
POTASSIUM SERPL-SCNC: 3.5 MMOL/L — SIGNIFICANT CHANGE UP (ref 3.5–5.3)
RBC # BLD: 3.66 M/UL — LOW (ref 4.2–5.8)
RBC # FLD: 18.2 % — HIGH (ref 10.3–14.5)
SODIUM SERPL-SCNC: 134 MMOL/L — LOW (ref 135–145)
WBC # BLD: 13.03 K/UL — HIGH (ref 3.8–10.5)
WBC # FLD AUTO: 13.03 K/UL — HIGH (ref 3.8–10.5)

## 2025-03-06 PROCEDURE — 99232 SBSQ HOSP IP/OBS MODERATE 35: CPT

## 2025-03-06 PROCEDURE — 99233 SBSQ HOSP IP/OBS HIGH 50: CPT | Mod: FS

## 2025-03-06 RX ORDER — INSULIN LISPRO 100 U/ML
5 INJECTION, SOLUTION INTRAVENOUS; SUBCUTANEOUS EVERY 6 HOURS
Refills: 0 | Status: DISCONTINUED | OUTPATIENT
Start: 2025-03-06 | End: 2025-03-11

## 2025-03-06 RX ORDER — INSULIN GLARGINE-YFGN 100 [IU]/ML
13 INJECTION, SOLUTION SUBCUTANEOUS AT BEDTIME
Refills: 0 | Status: DISCONTINUED | OUTPATIENT
Start: 2025-03-06 | End: 2025-03-11

## 2025-03-06 RX ADMIN — Medication 2.5 MILLIGRAM(S): at 15:36

## 2025-03-06 RX ADMIN — INSULIN LISPRO 1: 100 INJECTION, SOLUTION INTRAVENOUS; SUBCUTANEOUS at 05:13

## 2025-03-06 RX ADMIN — Medication 25 GRAM(S): at 18:02

## 2025-03-06 RX ADMIN — INSULIN LISPRO 1: 100 INJECTION, SOLUTION INTRAVENOUS; SUBCUTANEOUS at 23:55

## 2025-03-06 RX ADMIN — INSULIN LISPRO 4 UNIT(S): 100 INJECTION, SOLUTION INTRAVENOUS; SUBCUTANEOUS at 05:13

## 2025-03-06 RX ADMIN — Medication 2.5 MILLIGRAM(S): at 21:42

## 2025-03-06 RX ADMIN — WHITE PETROLATUM 1 APPLICATION(S): 1 OINTMENT TOPICAL at 18:06

## 2025-03-06 RX ADMIN — Medication 2.5 MILLIGRAM(S): at 07:51

## 2025-03-06 RX ADMIN — Medication 1 APPLICATION(S): at 11:57

## 2025-03-06 RX ADMIN — Medication 15 MILLILITER(S): at 05:03

## 2025-03-06 RX ADMIN — ATORVASTATIN CALCIUM 20 MILLIGRAM(S): 80 TABLET, FILM COATED ORAL at 21:49

## 2025-03-06 RX ADMIN — APIXABAN 5 MILLIGRAM(S): 2.5 TABLET, FILM COATED ORAL at 18:06

## 2025-03-06 RX ADMIN — INSULIN LISPRO 5 UNIT(S): 100 INJECTION, SOLUTION INTRAVENOUS; SUBCUTANEOUS at 18:07

## 2025-03-06 RX ADMIN — Medication 1 TABLET(S): at 11:58

## 2025-03-06 RX ADMIN — INSULIN LISPRO 4 UNIT(S): 100 INJECTION, SOLUTION INTRAVENOUS; SUBCUTANEOUS at 11:58

## 2025-03-06 RX ADMIN — Medication 50 MILLIGRAM(S): at 12:01

## 2025-03-06 RX ADMIN — Medication 300 MILLIGRAM(S): at 11:58

## 2025-03-06 RX ADMIN — INSULIN LISPRO 5 UNIT(S): 100 INJECTION, SOLUTION INTRAVENOUS; SUBCUTANEOUS at 23:56

## 2025-03-06 RX ADMIN — INSULIN LISPRO 2: 100 INJECTION, SOLUTION INTRAVENOUS; SUBCUTANEOUS at 11:58

## 2025-03-06 RX ADMIN — Medication 40 MILLIGRAM(S): at 06:30

## 2025-03-06 RX ADMIN — Medication 15 MILLILITER(S): at 18:05

## 2025-03-06 RX ADMIN — APIXABAN 5 MILLIGRAM(S): 2.5 TABLET, FILM COATED ORAL at 05:04

## 2025-03-06 RX ADMIN — WHITE PETROLATUM 1 APPLICATION(S): 1 OINTMENT TOPICAL at 05:14

## 2025-03-06 RX ADMIN — AMLODIPINE BESYLATE 10 MILLIGRAM(S): 10 TABLET ORAL at 05:05

## 2025-03-06 RX ADMIN — Medication 50 MILLIGRAM(S): at 21:49

## 2025-03-06 RX ADMIN — Medication 25 GRAM(S): at 05:06

## 2025-03-06 RX ADMIN — INSULIN LISPRO 1: 100 INJECTION, SOLUTION INTRAVENOUS; SUBCUTANEOUS at 18:06

## 2025-03-06 RX ADMIN — Medication 2.5 MILLIGRAM(S): at 04:22

## 2025-03-06 RX ADMIN — Medication 50 MILLIGRAM(S): at 05:04

## 2025-03-06 RX ADMIN — INSULIN GLARGINE-YFGN 13 UNIT(S): 100 INJECTION, SOLUTION SUBCUTANEOUS at 23:55

## 2025-03-06 NOTE — PROGRESS NOTE ADULT - ASSESSMENT
75 y/o M PMhx trach/vent (last changed 10/23/24), CVA x2 with residual R hemiplegia, COPD, ESRD on HD MWF who presented to ED for leukocytosis when initially planned for fistulogram and noted to have a WBC of 18.     pseudomonas PNA  fever- resolved  febrile 3/3  - CXR 3/3 w/ near total L lung opacification  - started on zosyn  trach aspirate pseudomonas sensitivities reviewed    VAP- treated  SARS-CoV-2/ RSV/ flu PCR negative  MRSA PCR negative  CXR- Right lower lobe infiltrate  blood cultures- NGTD  resp cx w/  pseudomonas  s/p cefepime x 7 days, completed 2/25    UTI- treated  UA w/ significant pyuria though noted epithelial cells indicating contamination  unable to assess urinary symptoms given patient's mentation  repeat urine cx also w/ E faecium  s/p vanc course, completed 2/25    DVT  RUE US- age-indeterminate, non-occlusive deep vein thrombosis noted in the right brachial vein  on eliquis    ESRD  HD per nephrology    Recommendations  c/w zosyn to complete 7 days w/ last day 3/10  aspiration precautions  contact isolation for candida auris  trend wbc, [facility] Infectious Disease  [phone]  After 5pm on weekdays and all day on weekends - please call [phone]  Available on [employer] teams    Thank you for consulting us and involving us in the management of this patients case. In addition to reviewing history, imaging, documents, labs, microbiology, took into account antibiotic stewardship, local antibiogram and infection control strategies and potential transmission issues.

## 2025-03-06 NOTE — PROGRESS NOTE ADULT - SUBJECTIVE AND OBJECTIVE BOX
[facility] [place]   MD ELIANE BHAGAT MD ANGELA WONG, PA QIAN CHEN, NP    TEL:  OFFICE: [phone]  From 5pm-7am Answering Service [phone]    -- RENAL FOLLOW UP NOTE ---Date of Service 03-06-25 @ 17:41    Patient is a 74y old  Male who presents with a chief complaint of 2/18/25 was in cath lab earlier today with vascular surgery for fistulogram and noted to have a WBC of 18 and sent to ED and admitted.    Patient seen and examined at bedside. No chest pain/SOB.  Trached to vent; no respiratory distress.    VITALS:  T(F): 98.6 (03-06-25 @ 16:00), Max: 98.6 (03-06-25 @ 16:00)  HR: 87 (03-06-25 @ 16:00)  BP: 143/52 (03-06-25 @ 16:00)  RR: 18 (03-06-25 @ 16:00)  SpO2: 100% (03-06-25 @ 16:00)  Wt(kg): --    03-05 @ 07:01  -  03-06 @ 07:00  --------------------------------------------------------  IN: 1080 mL / OUT: 2500 mL / NET: -1420 mL      PHYSICAL EXAM:  General: NAD  Neck: No JVD  Respiratory: CTAB, no wheezes, rales or rhonchi.  + trach; no respiratory distress.  Cardiovascular: S1, S2, RRR  Gastrointestinal: BS+, soft, NT/ND  Extremities: + b/l UE edema    Hospital Medications:   MEDICATIONS  (STANDING):  albuterol    0.083% 2.5 milliGRAM(s) Nebulizer every 6 hours  amLODIPine   Tablet 10 milliGRAM(s) Oral daily  apixaban 5 milliGRAM(s) Oral every 12 hours  AQUAPHOR (petrolatum Ointment) 1 Application(s) Topical two times a day  atorvastatin 20 milliGRAM(s) Oral at bedtime  chlorhexidine 0.12% Liquid 15 milliLiter(s) Oral Mucosa every 12 hours  chlorhexidine 2% Cloths 1 Application(s) Topical daily  dextrose 5%. 1000 milliLiter(s) (100 mL/Hr) IV Continuous <Continuous>  dextrose 5%. 1000 milliLiter(s) (50 mL/Hr) IV Continuous <Continuous>  dextrose 50% Injectable 25 Gram(s) IV Push once  dextrose 50% Injectable 12.5 Gram(s) IV Push once  dextrose 50% Injectable 25 Gram(s) IV Push once  epoetin reilly-epbx (RETACRIT) Injectable 80336 Unit(s) IV Push <User Schedule>  ferrous    sulfate Liquid 300 milliGRAM(s) Enteral Tube daily  glucagon  Injectable 1 milliGRAM(s) IntraMuscular once  hydrALAZINE 50 milliGRAM(s) Oral three times a day  insulin glargine Injectable (LANTUS) 13 Unit(s) SubCutaneous at bedtime  insulin lispro (ADMELOG) corrective regimen sliding scale   SubCutaneous every 6 hours  insulin lispro Injectable (ADMELOG) 5 Unit(s) SubCutaneous every 6 hours  Nephro-noam 1 Tablet(s) Oral daily  pantoprazole   Suspension 40 milliGRAM(s) Oral before breakfast  piperacillin/tazobactam IVPB.. 4.5 Gram(s) IV Intermittent every 12 hours  sodium chloride 0.9% for Nebulization 3 milliLiter(s) Nebulizer every 6 hours      LABS:  03-06    134[L]  |  91[L]  |  44[H]  ----------------------------<  157[H]  3.5   |  28  |  2.47[H]    Ca    9.6      06 Mar 2025 05:45  Phos  2.5     03-06  Mg     2.70     03-06      Creatinine Trend: 2.47 <--, 3.47 <--, 2.80 <--, 2.24 <--, 3.49 <--, 2.98 <--, 2.74 <--, 2.04 <--, 3.10 <--    Phosphorus: 2.5 mg/dL (03-06 @ 05:45)                          8.6    13.03 )-----------( 306      ( 06 Mar 2025 05:45 )             28.6     Urine Studies:  Urinalysis - [03-06-25 @ 05:45]      Color  / Appearance  / SG  / pH       Gluc 157 / Ketone   / Bili  / Urobili        Blood  / Protein  / Leuk Est  / Nitrite       RBC  / WBC  / Hyaline  / Gran  / Sq Epi  / Non Sq Epi  / Bacteria       Iron 46, TIBC 142, %sat 32      [02-19-25 @ 11:39]  Ferritin 3601      [02-19-25 @ 11:39]  PTH -- (Ca --)      [05-26-24 @ 01:20]   122  TSH 1.660      [10-05-24 @ 08:37]  Lipid: chol --, , HDL --, LDL --      [10-18-24 @ 06:00]    HBsAb 654.8      [02-19-25 @ 19:05]  HBsAg Nonreact      [02-19-25 @ 19:05]  HBcAb Nonreact      [02-19-25 @ 19:05]  HCV 0.19, Nonreact      [02-19-25 @ 19:05]

## 2025-03-06 NOTE — PROGRESS NOTE ADULT - ASSESSMENT
74-year-old male hx trach/vent - , CVA, COPD, stage renal disease on dialysis 4 times a week (MTRF) history of pressure ulcer.  Patient presents the ED today for elevated white count. nephrology consulted for dialysis needs    ESRD:  from [facility]  On HD MTTsF via an A-V fistula. s/p fistulogram by vascular 2/18  Continue MWF in hospital  S/p HD on 3/5.  Plan for HD tomorrow.  consent from wife in dialysis unit  monitor bmp    Hypertension  BP acceptable.  continue norvasc 10 daily and hydralazine increased to 50 tid 3/2  titrate hydralazine to 100mg TID if needed  max uf as tolerated  monitor BP    Anemia:  Transfuse for Hg < 7.  s/p 1 unit PRBC 2/23  epo with hd  iron sat >20  monitor Hgb    leukocytosis  sepsis work up per team    hyponatremia  in setting of esrd and hyperglycemia  optimize dm control  hd per schedule with UF  monitor Na    Hypermagnesemia  Improving.  HD per schedule.  Monitor Mg.

## 2025-03-06 NOTE — PROGRESS NOTE ADULT - SUBJECTIVE AND OBJECTIVE BOX
[facility]  AUGUST Carbajal Y. Patel, S. Shah, G. Casimir  [phone]  ([phone] - weekdays after 5pm and weekends)    Name: JIMMY BLAND  Age/Gender: 74y Male  MRN: [id number]    Interval History:  Notes reviewed.   No concerning overnight events.  Afebrile.     Allergies: No Known Allergies      Objective:  Vitals:   T(F): 98 (03-06-25 @ 08:00), Max: 98.2 (03-06-25 @ 04:00)  HR: 98 (03-06-25 @ 11:11) (76 - 98)  BP: 148/58 (03-06-25 @ 08:00) (130/63 - 154/63)  RR: 17 (03-06-25 @ 08:00) (17 - 18)  SpO2: 100% (03-06-25 @ 11:11) (100% - 100%)  Physical Examination:  General: frail appearing  HEENT: anicteric, tracheostomy, on vent  Cardio: S1, S2, normal rate  Resp: decreased breath sounds  Abd: Soft. Nondistended. PEG  Ext: no LE edema  Skin: warm, dry    Laboratory Studies:  CBC:                       8.6    13.03 )-----------( 306      ( 06 Mar 2025 05:45 )             28.6     WBC Trend:  13.03 03-06-25 @ 05:45  16.30 03-05-25 @ 11:34  13.65 03-03-25 @ 07:00  13.96 03-02-25 @ 09:09  15.75 03-02-25 @ 01:38  15.52 03-01-25 @ 06:05  17.77 02-28-25 @ 13:30    CMP: 03-06    134[L]  |  91[L]  |  44[H]  ----------------------------<  157[H]  3.5   |  28  |  2.47[H]    Ca    9.6      06 Mar 2025 05:45  Phos  2.5     03-06  Mg     2.70     03-06            Urinalysis Basic - ( 06 Mar 2025 05:45 )    Color: x / Appearance: x / SG: x / pH: x  Gluc: 157 mg/dL / Ketone: x  / Bili: x / Urobili: x   Blood: x / Protein: x / Nitrite: x   Leuk Esterase: x / RBC: x / WBC x   Sq Epi: x / Non Sq Epi: x / Bacteria: x      Microbiology: reviewed     Culture - Blood (collected 03-03-25 @ 16:27)  Source: Blood Blood-Peripheral  Preliminary Report (03-05-25 @ 22:01):    No growth at 48 Hours    Culture - Blood (collected 03-03-25 @ 16:23)  Source: Blood Blood-Venous  Preliminary Report (03-05-25 @ 22:01):    No growth at 48 Hours    Culture - Sputum (collected 03-03-25 @ 14:10)  Source: Trach Asp Tracheal Aspirate  Gram Stain (03-04-25 @ 04:28):    Few polymorphonuclear leukocytes per low power field    No Squamous epithelial cells per low power field    Moderate Gram Negative Rods seen per oil power field  Final Report (03-05-25 @ 16:12):    Numerous Pseudomonas aeruginosa (Carbapenem Resistant)    Commensal dominick consistent with body site  Organism: Pseudomonas aeruginosa (Carbapenem Resistant) (03-05-25 @ 16:12)  Organism: Pseudomonas aeruginosa (Carbapenem Resistant) (03-05-25 @ 16:12)      Method Type: CarbaR      -  Resistance Gene to Carbapenem: Nondet  Organism: Pseudomonas aeruginosa (Carbapenem Resistant) (03-05-25 @ 16:12)      Method Type: VIDA      -  Aztreonam: S <=4      -  Cefepime: S <=2      -  Ceftazidime: S 4      -  Ceftazidime/Avibactam: S <=4      -  Ceftolozane/tazobactam: S <=2      -  Ciprofloxacin: S <=0.25      -  Imipenem: R 8      -  Levofloxacin: S <=0.5      -  Meropenem: R 8      -  Piperacillin/Tazobactam: S <=8    Culture - Fungal, Bronchial (collected 02-26-25 @ 16:43)  Source: Bronchial Bronchial Lavage  Preliminary Report (03-05-25 @ 23:02):    No fungus isolated at 1 week.    Culture - Bronchial (collected 02-26-25 @ 16:43)  Source: Bronchial Bronchial Lavage  Gram Stain (02-26-25 @ 23:04):    Few polymorphonuclear leukocytes per low power field    No squamous epithelial cells    Few Gram Negative Rods per oil power field  Final Report (02-28-25 @ 17:24):    Few Pseudomonas aeruginosa    Commensal dominick consistent with body site  Organism: Pseudomonas aeruginosa (02-28-25 @ 17:24)  Organism: Pseudomonas aeruginosa (02-28-25 @ 17:24)      Method Type: VIDA      -  Aztreonam: S <=4      -  Cefepime: S <=2      -  Ceftazidime: S <=1      -  Ciprofloxacin: S <=0.25      -  Imipenem: S 2      -  Levofloxacin: S <=0.5      -  Meropenem: S <=1      -  Piperacillin/Tazobactam: S <=8    Culture - Urine (collected 02-20-25 @ 18:00)  Source: Clean Catch Clean Catch (Midstream)  Final Report (02-23-25 @ 08:45):    >100,000 CFU/ml Enterococcus faecium  Organism: Enterococcus faecium (02-23-25 @ 08:45)  Organism: Enterococcus faecium (02-23-25 @ 08:45)      Method Type: VIDA      -  Ampicillin: R >8 Predicts results to ampicillin/sulbactam, amoxacillin-clavulanate and  piperacillin-tazobactam.      -  Ciprofloxacin: R >2      -  Levofloxacin: R >4      -  Nitrofurantoin: S <=32 Should not be used to treat pyelonephritis.      -  Tetracycline: R >8      -  Vancomycin: S 0.5        Radiology: reviewed     Medications:  albuterol    0.083% 2.5 milliGRAM(s) Nebulizer every 6 hours  amLODIPine   Tablet 10 milliGRAM(s) Oral daily  apixaban 5 milliGRAM(s) Oral every 12 hours  AQUAPHOR (petrolatum Ointment) 1 Application(s) Topical two times a day  atorvastatin 20 milliGRAM(s) Oral at bedtime  chlorhexidine 0.12% Liquid 15 milliLiter(s) Oral Mucosa every 12 hours  chlorhexidine 2% Cloths 1 Application(s) Topical daily  dextrose 5%. 1000 milliLiter(s) IV Continuous <Continuous>  dextrose 5%. 1000 milliLiter(s) IV Continuous <Continuous>  dextrose 50% Injectable 25 Gram(s) IV Push once  dextrose 50% Injectable 12.5 Gram(s) IV Push once  dextrose 50% Injectable 25 Gram(s) IV Push once  dextrose Oral Gel 15 Gram(s) Oral once PRN  epoetin reilly-epbx (RETACRIT) Injectable 35872 Unit(s) IV Push <User Schedule>  ferrous    sulfate Liquid 300 milliGRAM(s) Enteral Tube daily  glucagon  Injectable 1 milliGRAM(s) IntraMuscular once  hydrALAZINE 50 milliGRAM(s) Oral three times a day  insulin glargine Injectable (LANTUS) 12 Unit(s) SubCutaneous at bedtime  insulin lispro (ADMELOG) corrective regimen sliding scale   SubCutaneous every 6 hours  insulin lispro Injectable (ADMELOG) 4 Unit(s) SubCutaneous every 6 hours  Nephro-noam 1 Tablet(s) Oral daily  pantoprazole   Suspension 40 milliGRAM(s) Oral before breakfast  piperacillin/tazobactam IVPB.. 4.5 Gram(s) IV Intermittent every 12 hours  sodium chloride 0.9% Bolus. 100 milliLiter(s) IV Bolus every 5 minutes PRN  sodium chloride 0.9% for Nebulization 3 milliLiter(s) Nebulizer every 6 hours    Antimicrobials:  piperacillin/tazobactam IVPB.. 4.5 Gram(s) IV Intermittent every 12 hours

## 2025-03-06 NOTE — PROGRESS NOTE ADULT - SUBJECTIVE AND OBJECTIVE BOX
Chief Complaint: type 2 DM    History: saw pt at bedside, wife at bedside. Pt trach/PEG tolerating bolus tube feeds. FS above goal last night, at goal this morning and above goal this afternoon.     MEDICATIONS  (STANDING):  albuterol    0.083% 2.5 milliGRAM(s) Nebulizer every 6 hours  amLODIPine   Tablet 10 milliGRAM(s) Oral daily  apixaban 5 milliGRAM(s) Oral every 12 hours  AQUAPHOR (petrolatum Ointment) 1 Application(s) Topical two times a day  atorvastatin 20 milliGRAM(s) Oral at bedtime  chlorhexidine 0.12% Liquid 15 milliLiter(s) Oral Mucosa every 12 hours  chlorhexidine 2% Cloths 1 Application(s) Topical daily  dextrose 5%. 1000 milliLiter(s) (100 mL/Hr) IV Continuous <Continuous>  dextrose 5%. 1000 milliLiter(s) (50 mL/Hr) IV Continuous <Continuous>  dextrose 50% Injectable 25 Gram(s) IV Push once  dextrose 50% Injectable 12.5 Gram(s) IV Push once  dextrose 50% Injectable 25 Gram(s) IV Push once  epoetin reilly-epbx (RETACRIT) Injectable 30277 Unit(s) IV Push <User Schedule>  ferrous    sulfate Liquid 300 milliGRAM(s) Enteral Tube daily  glucagon  Injectable 1 milliGRAM(s) IntraMuscular once  hydrALAZINE 50 milliGRAM(s) Oral three times a day  insulin glargine Injectable (LANTUS) 13 Unit(s) SubCutaneous at bedtime  insulin lispro (ADMELOG) corrective regimen sliding scale   SubCutaneous every 6 hours  insulin lispro Injectable (ADMELOG) 5 Unit(s) SubCutaneous every 6 hours  Nephro-noam 1 Tablet(s) Oral daily  pantoprazole   Suspension 40 milliGRAM(s) Oral before breakfast  piperacillin/tazobactam IVPB.. 4.5 Gram(s) IV Intermittent every 12 hours  sodium chloride 0.9% for Nebulization 3 milliLiter(s) Nebulizer every 6 hours    MEDICATIONS  (PRN):  dextrose Oral Gel 15 Gram(s) Oral once PRN Blood Glucose LESS THAN 70 milliGRAM(s)/deciliter  sodium chloride 0.9% Bolus. 100 milliLiter(s) IV Bolus every 5 minutes PRN SBP LESS THAN or EQUAL to 80 mmHg      Allergies    No Known Allergies    Intolerances      Review of Systems:    UNABLE TO OBTAIN    PHYSICAL EXAM:  VITALS: T(C): 36.7 (03-06-25 @ 08:00)  T(F): 98 (03-06-25 @ 08:00), Max: 98.2 (03-06-25 @ 04:00)  HR: 98 (03-06-25 @ 11:11) (76 - 98)  BP: 148/58 (03-06-25 @ 08:00) (130/63 - 154/63)  RR:  (17 - 18)  SpO2:  (100% - 100%)  Wt(kg): --  GENERAL: NAD  EYES: No proptosis  RESPIRATORY: non labored breathing- trach/ventilator  CARDIOVASCULAR: Regular rate and rhythm  GI: PEG on TF- tolerating bolus feeds      CAPILLARY BLOOD GLUCOSE      POCT Blood Glucose.: 218 mg/dL (06 Mar 2025 11:09)  POCT Blood Glucose.: 172 mg/dL (06 Mar 2025 05:10)  POCT Blood Glucose.: 202 mg/dL (05 Mar 2025 23:33)  POCT Blood Glucose.: 159 mg/dL (05 Mar 2025 17:50)      03-06    134[L]  |  91[L]  |  44[H]  ----------------------------<  157[H]  3.5   |  28  |  2.47[H]    eGFR: 27[L]    Ca    9.6      03-06  Mg     2.70     03-06  Phos  2.5     03-06    TPro  x   /  Alb  3.0[L]  /  TBili  x   /  DBili  x   /  AST  x   /  ALT  x   /  AlkPhos  x   03-04          Thyroid Function Tests:        A1C with Estimated Average Glucose Result: 6.4 % (02-19-25 @ 06:45)  A1C with Estimated Average Glucose Result: 4.8 % (10-05-24 @ 08:37)  A1C with Estimated Average Glucose Result: 5.9 % (07-16-24 @ 10:25)  A1C with Estimated Average Glucose Result: 6.9 % (04-30-24 @ 06:03)          Diet, NPO with Tube Feed:   Tube Feeding Modality: Gastrostomy  Nepro with Carb Steady (NEPRORTH)  Total Volume for 24 Hours (mL): 1160  Bolus  Total Volume of Bolus (mL):  290  Total # of Feeds: 4  Tube Feed Frequency: Every 6 hours   Tube Feed Start Time: 12:00  Bolus Feed Rate (mL per Hour): 290   Bolus Feed Duration (in Hours): 0  Liquid Protein Supplement     Qty per Day:  1 (02-25-25 @ 13:43)

## 2025-03-06 NOTE — ADVANCED PRACTICE NURSE CONSULT - REASON FOR CONSULT
Patient seen on skin care rounds for wound reassessment, last seen 2/27/2025. 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 Patent recently admitted in 6/2024 for left pontine and cerebellar CVA requiring tracheostomy. While at [facility] patient decannulated and passed SS with advanced diet to easy to chew with thin liquids, but complicated COVID requiring transfer to [facility] in 7/2024 and then ultimately discharged home. Per wife patient was doing well at home until 10/2024 when he was found with RLL with concern for aspiration requiring intubation, then tracheostomy, and ultimayely discharged to NH with vent dependence in 11/2024. Patient now presented for RUE fistulogram and angioplasty with vascular, however course complicated by leukocytosis with concern for recurrent trachitis vs PNA and UTI, AOCD and hyperglycemia. Admitted to [facility] for further management. Found with  PSA trachitis/ PNA and enterococcus faecium UTI. Course complicated by RUE brachial DVT and hypoxia with HD with concern for volume down vs PE. 2/26/25 Bronchoscopy.     Acute Skin Failure Markers: ESRD on hemodialysis, mechanical ventilation >72 hours, severe protein calorie malnutrition, hypoglycemia (FS 39 2/26/25), hypoperfusion r/t: anemia Hgb 6.5 requiring 1/2 PRBC transfusion (2/23),  desaturation/hypoxia on 2/21 ; RR 30s, tachycardic to 110, and SPO2 79%.      Chart reviewed: WBC 13.03, H/H 8.6/28.6, INR 2.12, Platelets 306, hA1c 6.4, BMI 20.4, gertrude 11.

## 2025-03-06 NOTE — PROGRESS NOTE ADULT - ASSESSMENT
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 Patent recently admitted in 6/2024 for left pontine and cerebellar CVA requiring tracheostomy. While at [facility] patient decannulated and passed SS with advanced diet to easy to chew with thin liquids, but complicated COVID requiring transfer to [facility] in 7/2024 and then ultimately discharged home. Per wife patient was doing well at home until 10/2024 when he was found with RLL with concern for aspiration requiring intubation, then tracheostomy, and ultimately discharged to NH with vent dependence in 11/2024. Patient now presented for RUE fistulogram and angioplasty with vascular, however course complicated by leukocytosis with concern for recurrent trachitis vs PNA and UTI, AOCD and hyperglycemia. Admitted to RCU for further management. Found with  PSA trachitis/ PNA and enterococcus faecium UTI. Course complicated by RUE brachial DVT and hypoxia with HD with concern for volume down vs PE?      NEUROLOGY  # Poor mentation second to metabolic encephalopathy vs psychosomatic/ depression   - Hx of L cerebellar and pontene embolic CVA x 2 with residual R hemiplegia   - AOx0, bedbound, and nonverbal at baseline per report, however per exam patient able to open eyes, able to follow commands on left (LUE>LLE) with SEVERE weakness, however noted with R hemiplegia per baseline  - Nods yes and no occasionally however keeps eyes closed likely psychosomatic vs metabolic encephalopathy with infections?   - Last CTH in 10/2024 with no acute findings   - Hold [facility] CT for now   - Monitor mentation     CARDIOVASCULAR  # Hx of HTN and HLD  - Continue on hydral 50 (increased 3/2) and norvasc 10   - Monitor BP     # Incidental Pericardial effusion   - Incidental small pericardial effusion seen adjacent to right ventricle, however IVC appears flat and LE without edema with low concern for RV failure.   - Prior TTE reviewed from 4/2024 with normal RVLVSF with no pericardial effusion noted at the time.   - TTE 2/23 with EF 65, normal LVRVSF, mild LAD, normal RA, mild pulmonary hypertension, and small pericardial effusion noted adjacent to the anterior right ventricle with no echocardiographic evidence of tamponade  - Monitor hemodynamics     RESPIRATORY  # Respiratory failure second to PNA vs volume down vs PE?   - Hx of COPD with chronic respiratory failure with tracheostomy and vent dependence  - Admitted for angioplasty of RUE AVF, however course complicated by leukocytosis with concern for recurrent trachitis/ PNA.   - Course complicated by hypoxia during HD likely volume down vs migration of RUE brachial DVT with PE?   - Held volume removal, bolus given, and hypoxia improved.   - CTA CHEST without PE  - Course complicated by LLL mucous plug and IPV started with improvement   - Continue on albuterol, NS 0.9 and IPV  - Continue on vent 15/400/6/40  - Hold PS for now    HEENT   # Hemoptysis   - Wife reported hemoptysis while at nursing home 2 weeks prior to admission   - No hemoptysis noted on admission   - Hemoptysis returned in house   - CTA CHEST 2/24 without PE  - Bronch 2/26 with no evidence of bleeding   - Bleeding resolved     GI  # Dysphagia with PEG   - Passed SS with advanced diet to easy to chew with thin liquids in 7/2024, however since recurrent aspiration in 10/2024 now with PEG/ vent dependence   - Continue on PEG TF and changed to bolus feeds   - Monitor tolerance     RENAL  # ESRD on HD   # AVF trouble  # Dehydration   - Presented for RUE fistulogram and angioplasty with vascular on 2/18   - Resumed on HD with no flow issue from AVF   - Course complicated by hypoxia with concern for volume down given small IVC and elevated lactate on 2/21  - Volume removal held, lactate improved, and hypoxia improved.   - Monitor volume status and continue on HD MWF in house per renal   - Return to HD MTRF outpatient   - Monitor renal function, electrolytes and UOP     # Hyponatremia likely volume down   - Concern for volume issues, however overall appears volume down with serum osmo 307  - Sodium 128 and improved with HD/ bolus during HD.   - Trend sodium with HD for now     # Elevated lactate   - Lactate elevated likely second to volume down?   - Post bolus lactate trending down from 3.3 to 2.7 to 1.3    INFECTIOUS DISEASE  # Recurrent LLL PNA   - CXR with LLL mucous plug   - POCUS with LLL consolidation vs atelectasis   - Recent BAL with PSA as below   - FULL RVP negative  - Fevers returned and zosyn (3/3 - ) started empirically   - Zosyn dose increased 3/5 to 4.5 g Q6H   - RPT BCx NGTD and SCx with PSO    # Hemoptysis   - Hemoptysis as above   - BAL with PSA as prior   - No fever and monitor off ABX     # Trachitis vs PNA/ UTI   - Hx of steno and PSA in sputum   - Flu/ COVID negative   - MRSA negative   - UCx with enterococcus   - SCx with  PSA   - Found with leukocytosis and started on cefepime and christiano (2/19) and vanco on HD days (2/21).   - No further steno seen in sputum and continue on cefepime (2/19 - 2/25) and vanco on HD days (2/21, 2/22 - 2/25)   - WBC increased likely reactive to hypoxic event and now improving  - ID following     # PPX   - MRSA PCR negative  - Candida auris PCR POSITIVE  - Continue on isolation precautions per IC    HEME  # AOCD   - Presented for angioplasty and found with anemia to 6.7 s/p 1U PRBC 2/19 and 1/2 PRBC 2/23   - Anemia panel with concern for AOCD   - Continue on EPO QIW and Fe   - Monitor HH     # Heparin resistance?   - PTT persistently low despite increasing heparin GTT   - Resume on heparin GTT and anti Xa level supra therapeutic on but PTT remained low  - Patient ultimately with concern for heparin resistance    VASCULAR  # RUE DVT   - RUE edema noted with age-indeterminate, non-occlusive deep vein thrombosis noted in the right brachial vein.   - Case discussed with vascular who recommends full AC   - CTH from prior with encephalomalcia, however no hx of intracranial bleed   - Prior UGIB while on AC, however discharged on eliquis in 7/2024 and completed 6 month course for RLE DVT   - Continue on heparin GTT, however held for hemoptysis and resistance   - Continued on argatroban with good tolerance   - Change to eliquis 3/3     ENDOCRINE  # DM2 A1C 14.0 (1/2024) > 6.4 (2/2025)  - Presented with hyperglycemia and continued on lantus and ISS   - Increased lantus, however FS continues to trend in 200s and changed to NPH with improvement.  - TF interrupted for bronchoscopy and adjustment to bolus feeds and FS dropped.   - NPH stopped and FS improved, however trended back up with continuation of tube feeds.   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 Patent recently admitted in 6/2024 for left pontine and cerebellar CVA requiring tracheostomy. While at [facility] patient decannulated and passed SS with advanced diet to easy to chew with thin liquids, but complicated COVID requiring transfer to [facility] in 7/2024 and then ultimately discharged home. Per wife patient was doing well at home until 10/2024 when he was found with RLL with concern for aspiration requiring intubation, then tracheostomy, and ultimately discharged to NH with vent dependence in 11/2024. Patient now presented for RUE fistulogram and angioplasty with vascular, however course complicated by leukocytosis with concern for recurrent trachitis vs PNA and UTI, AOCD and hyperglycemia. Admitted to RCU for further management. Found with  PSA trachitis/ PNA and enterococcus faecium UTI. Course complicated by RUE brachial DVT and hypoxia with HD with concern for volume down vs PE?      NEUROLOGY  # Poor mentation second to metabolic encephalopathy vs psychosomatic/ depression   - Hx of L cerebellar and pontene embolic CVA x 2 with residual R hemiplegia   - AOx0, bedbound, and nonverbal at baseline per report, however per exam patient able to open eyes, able to follow commands on left (LUE>LLE) with SEVERE weakness, however noted with R hemiplegia per baseline  - Nods yes and no occasionally however keeps eyes closed likely psychosomatic vs metabolic encephalopathy with infections?   - Last CTH in 10/2024 with no acute findings   - Hold [facility] CT for now   - Monitor mentation     CARDIOVASCULAR  # Hx of HTN and HLD  - Continue on hydral 50 (increased 3/2) and norvasc 10   - Monitor BP     # Incidental Pericardial effusion   - Incidental small pericardial effusion seen adjacent to right ventricle, however IVC appears flat and LE without edema with low concern for RV failure.   - Prior TTE reviewed from 4/2024 with normal RVLVSF with no pericardial effusion noted at the time.   - TTE 2/23 with EF 65, normal LVRVSF, mild LAD, normal RA, mild pulmonary hypertension, and small pericardial effusion noted adjacent to the anterior right ventricle with no echocardiographic evidence of tamponade  - Monitor hemodynamics     RESPIRATORY  # Respiratory failure second to PNA vs volume down vs PE?   - Hx of COPD with chronic respiratory failure with tracheostomy and vent dependence  - Admitted for angioplasty of RUE AVF, however course complicated by leukocytosis with concern for recurrent trachitis/ PNA.   - Course complicated by hypoxia during HD likely volume down vs migration of RUE brachial DVT with PE?   - Held volume removal, bolus given, and hypoxia improved.   - CTA CHEST without PE  - Course complicated by LLL mucous plug and IPV started with improvement   - Continue on albuterol, NS 0.9 and IPV  - Continue on vent 15/400/6/40  - Hold PS for now    HEENT   # Hemoptysis   - Wife reported hemoptysis while at nursing home 2 weeks prior to admission   - No hemoptysis noted on admission   - Hemoptysis returned in house   - CTA CHEST 2/24 without PE  - Bronch 2/26 with no evidence of bleeding   - Bleeding resolved     GI  # Dysphagia with PEG   - Passed SS with advanced diet to easy to chew with thin liquids in 7/2024, however since recurrent aspiration in 10/2024 now with PEG/ vent dependence   - Continue on PEG TF and changed to bolus feeds   - Monitor tolerance     RENAL  # ESRD on HD   # AVF trouble  # Dehydration   - Presented for RUE fistulogram and angioplasty with vascular on 2/18   - Resumed on HD with no flow issue from AVF   - Course complicated by hypoxia with concern for volume down given small IVC and elevated lactate on 2/21  - Volume removal held, lactate improved, and hypoxia improved.   - Monitor volume status and continue on HD MWF in house per renal   - Return to HD MTRF outpatient   - Monitor renal function, electrolytes and UOP     # Hyponatremia likely volume down   - Concern for volume issues, however overall appears volume down with serum osmo 307  - Sodium 128 and improved with HD/ bolus during HD.   - Trend sodium with HD for now     # Elevated lactate   - Lactate elevated likely second to volume down?   - Post bolus lactate trending down from 3.3 to 2.7 to 1.3    INFECTIOUS DISEASE  # Recurrent LLL PNA   - CXR with LLL mucous plug   - POCUS with LLL consolidation vs atelectasis   - Recent BAL with PSA as below   - FULL RVP negative  - Fevers returned and zosyn (3/3 -3/10 ) started empirically   - Zosyn dose increased 3/5 to 4.5 g Q6H   - RPT BCx NGTD and SCx with PSO    # Hemoptysis   - Hemoptysis as above   - BAL with PSA as prior   - No fever and monitor off ABX     # Trachitis vs PNA/ UTI   - Hx of steno and PSA in sputum   - Flu/ COVID negative   - MRSA negative   - UCx with enterococcus   - SCx with  PSA   - Found with leukocytosis and started on cefepime and christiano (2/19) and vanco on HD days (2/21).   - No further steno seen in sputum and continue on cefepime (2/19 - 2/25) and vanco on HD days (2/21, 2/22 - 2/25)   - WBC increased likely reactive to hypoxic event and now improving  - ID following     # PPX   - MRSA PCR negative  - Candida auris PCR POSITIVE  - Continue on isolation precautions per IC    HEME  # AOCD   - Presented for angioplasty and found with anemia to 6.7 s/p 1U PRBC 2/19 and 1/2 PRBC 2/23   - Anemia panel with concern for AOCD   - Continue on EPO QIW and Fe   - Monitor HH     # Heparin resistance?   - PTT persistently low despite increasing heparin GTT   - Resume on heparin GTT and anti Xa level supra therapeutic on but PTT remained low  - Patient ultimately with concern for heparin resistance    VASCULAR  # RUE DVT   - RUE edema noted with age-indeterminate, non-occlusive deep vein thrombosis noted in the right brachial vein.   - Case discussed with vascular who recommends full AC   - CTH from prior with encephalomalcia, however no hx of intracranial bleed   - Prior UGIB while on AC, however discharged on eliquis in 7/2024 and completed 6 month course for RLE DVT   - Continue on heparin GTT, however held for hemoptysis and resistance   - Continued on argatroban with good tolerance   - Change to eliquis 3/3     ENDOCRINE  # DM2 A1C 14.0 (1/2024) > 6.4 (2/2025)  - Presented with hyperglycemia and continued on lantus and ISS   - Increased lantus, however FS continues to trend in 200s and changed to NPH with improvement.  - TF interrupted for bronchoscopy and adjustment to bolus feeds and FS dropped.   - NPH stopped and FS improved, however trended back up with continuation of tube feeds.   - NPH resumed, however FS continues to waxe and wane and case discussed with endocrine   - Insulin changed back to lantus 12 and lispro 4 with ISS for now   - Monitor FS and adjust as needed  - Endocrine following     ETHICS/ GOC    - FULL CODE   - Wife wishes for palliative discussion pending    DISPO - Back to NH when able

## 2025-03-06 NOTE — PROGRESS NOTE ADULT - SUBJECTIVE AND OBJECTIVE BOX
CHIEF COMPLAINT: Patient is a 74y old  Male who presents with a chief complaint of 2/18/25 was in cath lab earlier today with vascular surgery for fistulogram and noted to have a WBC of 18 and sent to ED and admitted (04 Mar 2025 12:22)      INTERVAL EVENTS: No overnight events     ROS: Seen by bedside during AM rounds     OBJECTIVE:  ICU Vital Signs Last 24 Hrs  T(C): 36.8 (06 Mar 2025 04:00), Max: 36.8 (06 Mar 2025 04:00)  T(F): 98.2 (06 Mar 2025 04:00), Max: 98.2 (06 Mar 2025 04:00)  HR: 79 (06 Mar 2025 04:24) (76 - 91)  BP: 145/63 (06 Mar 2025 04:00) (130/63 - 163/67)  BP(mean): --  ABP: --  ABP(mean): --  RR: 18 (06 Mar 2025 04:00) (17 - 18)  SpO2: 100% (06 Mar 2025 04:24) (100% - 100%)    O2 Parameters below as of 06 Mar 2025 04:22  Patient On (Oxygen Delivery Method): ventilator          Mode: AC/ CMV (Assist Control/ Continuous Mandatory Ventilation), RR (machine): 15, TV (machine): 400, FiO2: 40, PEEP: 6, ITime: 0.6, MAP: 10, PIP: 29    03-05 @ 07:01  -  03-06 @ 07:00  --------------------------------------------------------  IN: 1080 mL / OUT: 2500 mL / NET: -1420 mL      CAPILLARY BLOOD GLUCOSE      POCT Blood Glucose.: 172 mg/dL (06 Mar 2025 05:10)      PHYSICAL EXAM:  General: NAD, laying in bed with eyes closed,   HEENT: NCAT   Neck: Supple, trach midline connected to vent.   Respiratory: Coarse breath sounds b/l. Normal respiratory effort and no accessory muscle use. No wheezes or rales appreciated.   Cardiovascular: RRR, +S1 and S2.   Abdomen: Nontender, nondistended, soft. Normoactive BS. +PEG  Extremities: No edema. No active ROM of b/l UE and LE.   Skin: Warm, cap refill 2 sec.   Neurological: Arousable to touch, alert, tracks with eyes, does not respond to verbal commands.   Psychiatry: Normal affect, not agitated.       Mode: AC/ CMV (Assist Control/ Continuous Mandatory Ventilation)  RR (machine): 15  TV (machine): 400  FiO2: 40  PEEP: 6  ITime: 0.6  MAP: 10  PIP: 29      HOSPITAL MEDICATIONS:  MEDICATIONS  (STANDING):  albuterol    0.083% 2.5 milliGRAM(s) Nebulizer every 6 hours  amLODIPine   Tablet 10 milliGRAM(s) Oral daily  apixaban 5 milliGRAM(s) Oral every 12 hours  AQUAPHOR (petrolatum Ointment) 1 Application(s) Topical two times a day  atorvastatin 20 milliGRAM(s) Oral at bedtime  chlorhexidine 0.12% Liquid 15 milliLiter(s) Oral Mucosa every 12 hours  chlorhexidine 2% Cloths 1 Application(s) Topical daily  dextrose 5%. 1000 milliLiter(s) (100 mL/Hr) IV Continuous <Continuous>  dextrose 5%. 1000 milliLiter(s) (50 mL/Hr) IV Continuous <Continuous>  dextrose 50% Injectable 25 Gram(s) IV Push once  dextrose 50% Injectable 12.5 Gram(s) IV Push once  dextrose 50% Injectable 25 Gram(s) IV Push once  epoetin reilly-epbx (RETACRIT) Injectable 67815 Unit(s) IV Push <User Schedule>  ferrous    sulfate Liquid 300 milliGRAM(s) Enteral Tube daily  glucagon  Injectable 1 milliGRAM(s) IntraMuscular once  hydrALAZINE 50 milliGRAM(s) Oral three times a day  insulin glargine Injectable (LANTUS) 12 Unit(s) SubCutaneous at bedtime  insulin lispro (ADMELOG) corrective regimen sliding scale   SubCutaneous every 6 hours  insulin lispro Injectable (ADMELOG) 4 Unit(s) SubCutaneous every 6 hours  Nephro-noam 1 Tablet(s) Oral daily  pantoprazole   Suspension 40 milliGRAM(s) Oral before breakfast  piperacillin/tazobactam IVPB.. 4.5 Gram(s) IV Intermittent every 12 hours  sodium chloride 0.9% for Nebulization 3 milliLiter(s) Nebulizer every 6 hours    MEDICATIONS  (PRN):  dextrose Oral Gel 15 Gram(s) Oral once PRN Blood Glucose LESS THAN 70 milliGRAM(s)/deciliter  sodium chloride 0.9% Bolus. 100 milliLiter(s) IV Bolus every 5 minutes PRN SBP LESS THAN or EQUAL to 80 mmHg      LABS:                        8.6    13.03 )-----------( 306      ( 06 Mar 2025 05:45 )             28.6     03-06    134[L]  |  91[L]  |  44[H]  ----------------------------<  157[H]  3.5   |  28  |  2.47[H]    Ca    9.6      06 Mar 2025 05:45  Phos  2.5     03-06  Mg     2.70     03-06        Urinalysis Basic - ( 06 Mar 2025 05:45 )    Color: x / Appearance: x / SG: x / pH: x  Gluc: 157 mg/dL / Ketone: x  / Bili: x / Urobili: x   Blood: x / Protein: x / Nitrite: x   Leuk Esterase: x / RBC: x / WBC x   Sq Epi: x / Non Sq Epi: x / Bacteria: x

## 2025-03-06 NOTE — PROGRESS NOTE ADULT - ASSESSMENT
75 yo M with hx trach/vent (last changed 10/23/24), CVA x2 with residual R hemiplegia, COPD, ESRD on HD MWF (last 2/17), pressure ulcer presenting to [facility] ED for leukocytosis. Patient was intially up in the cath lab earlier today with vascular surgery for fistulogram and noted to have a WBC of 18 and sent to ED. Patient nonverbal at baseline, history provided by son. Per wife, patient at normal baseline, somewhat able to make needs known and is not complaining of any pain. . Endocrinology was consulted for management of diabetes mellitus type 2- pt on TF with hyperglycemia.     #Type 2 Diabetes Mellitus  - HbA1c 6.4  ; home regimen: pt on basal/bolus and on TF outpt- as per wife pt on humalog 2 units TID and flextouch pen (tresiba?) 6 units at bedtime when pt was home- pt currently from [facility]- please confirm what [facility] has been doing with regards to pt insulin regimen.  -egfr: 23    Plan:   - FS goal 100-180: FS above goal last night, at goal this morning and above goal this afternoon.   - Pt getting TF nepro carb stead bolus feeds of 290ml q 6 hours via PEG- pt was on NPH q 6 hours  - Increase slightly to 13 units of lantus QHS tonight  - Increase slightly to 5 units of Admelog q 6 hours (give prior to giving tube feed bolus). hold if holding TF- START at 12pm today.   - Continue low Admelog correction scale q 6 hours - will monitor FS to see if need to increase to moderate scale  - Please check FSG before q6h while on TF and if NPO  - Please update endocrine if any changes to tube feeding - if no longer bolus or if rate changes.   - RD consult  - hypoglycemia orderset prn  - extensively discussed importance of glycemic control to prevent micro- and macrovascular complications  - discussed importance of weight loss, exercise, and changing diet   - reviewed symptoms and management of hypoglycemia  - reviewed basics of insulin administration and pharmacokinetics, glucometer monitoring, as well as glycemic goals for outpatient setting  - Discharge planning: If continues bolus feeds on discharge then pt will continue basal/bolus regimen doses TBD on dc.     #Hypertension  - BP goal <130/80  -elevated BP  -Pt on norvasc and hydralazine  - Management as per primary team    #Hyperlipidemia  - check fasting lipid panel  -goal LDL <70  Plan:   - statin: on lipitor 20mg at bedtime via PEG    #ESRD on HD  renal following  monitor electrolytes    EMILY Greene-BC  Nurse Practitioner  Division of Endocrinology & Diabetes  contact on teams    If before 9AM or after 5PM, or on weekends/holidays, please call the Endocrine answering service for assistance ([phone]).  For nonurgent matters, please email [email or web address] for assistance.

## 2025-03-06 NOTE — ADVANCED PRACTICE NURSE CONSULT - RECOMMEDATIONS
Continue topical recommendations as ordered.   Please contact Wound/Ostomy Care Service Line if we can be of further assistance (ext [phone]). Please reconsult if changes to tissue type noted.

## 2025-03-06 NOTE — PROGRESS NOTE ADULT - NS ATTEND AMEND GEN_ALL_CORE FT
agree with above  hemodyn stable and afebrile  ID f/u appreciated, plan for zosyn through 3/10  palliative input appreciated  d/c planning

## 2025-03-07 ENCOUNTER — RESULT REVIEW (OUTPATIENT)
Age: 75
End: 2025-03-07

## 2025-03-07 LAB
ANION GAP SERPL CALC-SCNC: 14 MMOL/L — SIGNIFICANT CHANGE UP (ref 7–14)
BASOPHILS # BLD AUTO: 0.06 K/UL — SIGNIFICANT CHANGE UP (ref 0–0.2)
BASOPHILS NFR BLD AUTO: 0.5 % — SIGNIFICANT CHANGE UP (ref 0–2)
BLD GP AB SCN SERPL QL: NEGATIVE — SIGNIFICANT CHANGE UP
BUN SERPL-MCNC: 58 MG/DL — HIGH (ref 7–23)
CALCIUM SERPL-MCNC: 9.4 MG/DL — SIGNIFICANT CHANGE UP (ref 8.4–10.5)
CHLORIDE SERPL-SCNC: 91 MMOL/L — LOW (ref 98–107)
CO2 SERPL-SCNC: 26 MMOL/L — SIGNIFICANT CHANGE UP (ref 22–31)
CREAT SERPL-MCNC: 3.24 MG/DL — HIGH (ref 0.5–1.3)
EGFR: 20 ML/MIN/1.73M2 — LOW
EGFR: 20 ML/MIN/1.73M2 — LOW
EOSINOPHIL # BLD AUTO: 0.4 K/UL — SIGNIFICANT CHANGE UP (ref 0–0.5)
EOSINOPHIL NFR BLD AUTO: 3.4 % — SIGNIFICANT CHANGE UP (ref 0–6)
GLUCOSE BLDC GLUCOMTR-MCNC: 116 MG/DL — HIGH (ref 70–99)
GLUCOSE BLDC GLUCOMTR-MCNC: 153 MG/DL — HIGH (ref 70–99)
GLUCOSE BLDC GLUCOMTR-MCNC: 170 MG/DL — HIGH (ref 70–99)
GLUCOSE BLDC GLUCOMTR-MCNC: 200 MG/DL — HIGH (ref 70–99)
GLUCOSE BLDC GLUCOMTR-MCNC: 205 MG/DL — HIGH (ref 70–99)
GLUCOSE SERPL-MCNC: 132 MG/DL — HIGH (ref 70–99)
HCT VFR BLD CALC: 25.3 % — LOW (ref 39–50)
HCT VFR BLD CALC: 27.5 % — LOW (ref 39–50)
HGB BLD-MCNC: 7.9 G/DL — LOW (ref 13–17)
HGB BLD-MCNC: 8.7 G/DL — LOW (ref 13–17)
IANC: 9.89 K/UL — HIGH (ref 1.8–7.4)
IMM GRANULOCYTES NFR BLD AUTO: 0.8 % — SIGNIFICANT CHANGE UP (ref 0–0.9)
LYMPHOCYTES # BLD AUTO: 0.8 K/UL — LOW (ref 1–3.3)
LYMPHOCYTES # BLD AUTO: 6.7 % — LOW (ref 13–44)
MAGNESIUM SERPL-MCNC: 2.9 MG/DL — HIGH (ref 1.6–2.6)
MCHC RBC-ENTMCNC: 23.6 PG — LOW (ref 27–34)
MCHC RBC-ENTMCNC: 23.8 PG — LOW (ref 27–34)
MCHC RBC-ENTMCNC: 31.2 G/DL — LOW (ref 32–36)
MCHC RBC-ENTMCNC: 31.6 G/DL — LOW (ref 32–36)
MCV RBC AUTO: 75.3 FL — LOW (ref 80–100)
MCV RBC AUTO: 75.5 FL — LOW (ref 80–100)
MONOCYTES # BLD AUTO: 0.64 K/UL — SIGNIFICANT CHANGE UP (ref 0–0.9)
MONOCYTES NFR BLD AUTO: 5.4 % — SIGNIFICANT CHANGE UP (ref 2–14)
NEUTROPHILS # BLD AUTO: 9.89 K/UL — HIGH (ref 1.8–7.4)
NEUTROPHILS NFR BLD AUTO: 83.2 % — HIGH (ref 43–77)
NRBC # BLD AUTO: 0.06 K/UL — HIGH (ref 0–0)
NRBC # BLD AUTO: 0.16 K/UL — HIGH (ref 0–0)
NRBC # FLD: 0.06 K/UL — HIGH (ref 0–0)
NRBC # FLD: 0.16 K/UL — HIGH (ref 0–0)
NRBC BLD AUTO-RTO: 0 /100 WBCS — SIGNIFICANT CHANGE UP (ref 0–0)
NRBC BLD AUTO-RTO: 1 /100 WBCS — HIGH (ref 0–0)
PHOSPHATE SERPL-MCNC: 3 MG/DL — SIGNIFICANT CHANGE UP (ref 2.5–4.5)
PLATELET # BLD AUTO: 276 K/UL — SIGNIFICANT CHANGE UP (ref 150–400)
PLATELET # BLD AUTO: 313 K/UL — SIGNIFICANT CHANGE UP (ref 150–400)
POTASSIUM SERPL-MCNC: 3.4 MMOL/L — LOW (ref 3.5–5.3)
POTASSIUM SERPL-SCNC: 3.4 MMOL/L — LOW (ref 3.5–5.3)
RBC # BLD: 3.35 M/UL — LOW (ref 4.2–5.8)
RBC # BLD: 3.65 M/UL — LOW (ref 4.2–5.8)
RBC # FLD: 17.9 % — HIGH (ref 10.3–14.5)
RBC # FLD: 18.1 % — HIGH (ref 10.3–14.5)
RH IG SCN BLD-IMP: POSITIVE — SIGNIFICANT CHANGE UP
WBC # BLD: 11.89 K/UL — HIGH (ref 3.8–10.5)
WBC # BLD: 15.35 K/UL — HIGH (ref 3.8–10.5)
WBC # FLD AUTO: 11.89 K/UL — HIGH (ref 3.8–10.5)
WBC # FLD AUTO: 15.35 K/UL — HIGH (ref 3.8–10.5)

## 2025-03-07 PROCEDURE — 93970 EXTREMITY STUDY: CPT | Mod: 26

## 2025-03-07 PROCEDURE — 99233 SBSQ HOSP IP/OBS HIGH 50: CPT | Mod: FS

## 2025-03-07 PROCEDURE — 93971 EXTREMITY STUDY: CPT | Mod: 26,RT

## 2025-03-07 PROCEDURE — 99232 SBSQ HOSP IP/OBS MODERATE 35: CPT

## 2025-03-07 RX ORDER — INSULIN LISPRO 100 U/ML
4 INJECTION, SOLUTION INTRAVENOUS; SUBCUTANEOUS ONCE
Refills: 0 | Status: COMPLETED | OUTPATIENT
Start: 2025-03-07 | End: 2025-03-07

## 2025-03-07 RX ORDER — HEPARIN SODIUM 1000 [USP'U]/ML
5000 INJECTION INTRAVENOUS; SUBCUTANEOUS EVERY 8 HOURS
Refills: 0 | Status: DISCONTINUED | OUTPATIENT
Start: 2025-03-07 | End: 2025-03-08

## 2025-03-07 RX ADMIN — INSULIN LISPRO 1: 100 INJECTION, SOLUTION INTRAVENOUS; SUBCUTANEOUS at 17:30

## 2025-03-07 RX ADMIN — Medication 25 GRAM(S): at 11:55

## 2025-03-07 RX ADMIN — INSULIN GLARGINE-YFGN 13 UNIT(S): 100 INJECTION, SOLUTION SUBCUTANEOUS at 23:08

## 2025-03-07 RX ADMIN — AMLODIPINE BESYLATE 10 MILLIGRAM(S): 10 TABLET ORAL at 13:36

## 2025-03-07 RX ADMIN — Medication 40 MILLIGRAM(S): at 05:57

## 2025-03-07 RX ADMIN — Medication 50 MILLIGRAM(S): at 11:55

## 2025-03-07 RX ADMIN — Medication 15 MILLILITER(S): at 05:56

## 2025-03-07 RX ADMIN — INSULIN LISPRO 5 UNIT(S): 100 INJECTION, SOLUTION INTRAVENOUS; SUBCUTANEOUS at 17:31

## 2025-03-07 RX ADMIN — INSULIN LISPRO 1: 100 INJECTION, SOLUTION INTRAVENOUS; SUBCUTANEOUS at 23:09

## 2025-03-07 RX ADMIN — Medication 2.5 MILLIGRAM(S): at 04:35

## 2025-03-07 RX ADMIN — Medication 50 MILLIGRAM(S): at 17:35

## 2025-03-07 RX ADMIN — Medication 15 MILLILITER(S): at 17:35

## 2025-03-07 RX ADMIN — APIXABAN 5 MILLIGRAM(S): 2.5 TABLET, FILM COATED ORAL at 05:56

## 2025-03-07 RX ADMIN — WHITE PETROLATUM 1 APPLICATION(S): 1 OINTMENT TOPICAL at 17:36

## 2025-03-07 RX ADMIN — WHITE PETROLATUM 1 APPLICATION(S): 1 OINTMENT TOPICAL at 05:56

## 2025-03-07 RX ADMIN — Medication 2.5 MILLIGRAM(S): at 09:16

## 2025-03-07 RX ADMIN — Medication 50 MILLIGRAM(S): at 21:27

## 2025-03-07 RX ADMIN — Medication 2.5 MILLIGRAM(S): at 14:48

## 2025-03-07 RX ADMIN — Medication 1 TABLET(S): at 11:55

## 2025-03-07 RX ADMIN — INSULIN LISPRO 1: 100 INJECTION, SOLUTION INTRAVENOUS; SUBCUTANEOUS at 06:07

## 2025-03-07 RX ADMIN — EPOETIN ALFA 10000 UNIT(S): 10000 SOLUTION INTRAVENOUS; SUBCUTANEOUS at 08:37

## 2025-03-07 RX ADMIN — Medication 25 GRAM(S): at 21:27

## 2025-03-07 RX ADMIN — INSULIN LISPRO 4 UNIT(S): 100 INJECTION, SOLUTION INTRAVENOUS; SUBCUTANEOUS at 14:29

## 2025-03-07 RX ADMIN — Medication 1 APPLICATION(S): at 11:54

## 2025-03-07 RX ADMIN — INSULIN LISPRO 5 UNIT(S): 100 INJECTION, SOLUTION INTRAVENOUS; SUBCUTANEOUS at 06:07

## 2025-03-07 RX ADMIN — Medication 300 MILLIGRAM(S): at 11:54

## 2025-03-07 RX ADMIN — Medication 2.5 MILLIGRAM(S): at 23:02

## 2025-03-07 RX ADMIN — INSULIN LISPRO 5 UNIT(S): 100 INJECTION, SOLUTION INTRAVENOUS; SUBCUTANEOUS at 23:08

## 2025-03-07 RX ADMIN — ATORVASTATIN CALCIUM 20 MILLIGRAM(S): 80 TABLET, FILM COATED ORAL at 21:27

## 2025-03-07 NOTE — PROGRESS NOTE ADULT - ASSESSMENT
75 yo M with hx trach/vent (last changed 10/23/24), CVA x2 with residual R hemiplegia, COPD, ESRD on HD MWF (last 2/17), pressure ulcer presenting [facility] ED for leukocytosis. Patient was intially up in the cath lab earlier today with vascular surgery for fistulogram and noted to have a WBC of 18 and sent to ED. Patient nonverbal at baseline, history provided by son. Per wife, patient at normal baseline, somewhat able to make needs known and is not complaining of any pain. . Endocrinology was consulted for management of diabetes mellitus type 2- pt on TF with hyperglycemia.     #Type 2 Diabetes Mellitus  - HbA1c 6.4  ; home regimen: pt on basal/bolus and on TF outpt- as per wife pt on humalog 2 units TID and flextouch pen (tresiba?) 6 units at bedtime when pt was home- pt currently from [facility]- please confirm what [place] has been doing with regards to pt insulin regimen.  -egfr: 23    Plan:   - FS goal 100-180: FS at goal today. FS prior to afternoon bolus at goal- in orders premeal was marked as held- called and d/w provider to not hold - pt was in the middle of getting bolus feed so instructed to give 1x order of 4 units for now for this afternoon dose.   - Pt getting TF nepro carb stead bolus feeds of 290ml q 6 hours via PEG- pt was on NPH q 6 hours  - Continue 13 units of lantus QHS tonight  - Continue 5 units of Admelog q 6 hours (give prior to giving tube feed bolus). hold if holding TF.  - Continue low Admelog correction scale q 6 hours - will monitor FS to see if need to increase to moderate scale  - Please check FSG before q6h while on TF and if NPO  - Please update endocrine if any changes to tube feeding - if no longer bolus or if rate changes.   - RD consult  - hypoglycemia orderset prn  - extensively discussed importance of glycemic control to prevent micro- and macrovascular complications  - discussed importance of weight loss, exercise, and changing diet   - reviewed symptoms and management of hypoglycemia  - reviewed basics of insulin administration and pharmacokinetics, glucometer monitoring, as well as glycemic goals for outpatient setting  - Discharge planning: If continues bolus feeds on discharge then pt will continue basal/bolus regimen doses TBD on dc.     #Hypertension  - BP goal <130/80  -elevated BP  -Pt on norvasc and hydralazine  - Management as per primary team    #Hyperlipidemia  - check fasting lipid panel  -goal LDL <70  Plan:   - statin: on lipitor 20mg at bedtime via PEG    #ESRD on HD  renal following  monitor electrolytes    d/w [facility].     EMILY Greene-BC  Nurse Practitioner  Division of Endocrinology & Diabetes  contact on teams    If before 9AM or after 5PM, or on weekends/holidays, please call the Endocrine answering service for assistance ([phone]).  For nonurgent matters, please email [email or web address] for assistance.

## 2025-03-07 NOTE — PROGRESS NOTE ADULT - ASSESSMENT
75 y/o M PMhx trach/vent (last changed 10/23/24), CVA x2 with residual R hemiplegia, COPD, ESRD on HD MWF who presented to ED for leukocytosis when initially planned for fistulogram and noted to have a WBC of 18.   he was treated for E faecium UTI, and  pseudomonas VAP s/p cefepime, completed 2/25  he developed fever and was started on zosyn  bronch cx grew pseudomonas    pseudomonas PNA  fever- resolved  febrile 3/3  - CXR 3/3 w/ near total L lung opacification  - started on zosyn  trach aspirate pseudomonas sensitivities reviewed    DVT  RUE US- age-indeterminate, non-occlusive deep vein thrombosis noted in the right brachial vein  on eliquis    ESRD  HD per nephrology    Recommendations  c/w zosyn to complete 7 days w/ last day 3/10  aspiration precautions  contact isolation for candida auris  trend wbc, temps    Dr. Delano Ross will be covering 3/8 & 3/9. I will resume care on 3/10   Steven Torres M.D.  [facility]  [phone]  After 5pm on weekdays and all day on weekends - please call [phone]  Available on microsoft teams    Thank you for consulting us and involving us in the management of this patients case. In addition to reviewing history, imaging, documents, labs, microbiology, took into account antibiotic stewardship, local antibiogram and infection control strategies and potential transmission issues.

## 2025-03-07 NOTE — PROGRESS NOTE ADULT - SUBJECTIVE AND OBJECTIVE BOX
CHIEF COMPLAINT:    INTERVAL EVENTS:     ROS: Seen by bedside during AM rounds     OBJECTIVE:  ICU Vital Signs Last 24 Hrs  T(C): 36.7 (07 Mar 2025 08:00), Max: 37 (06 Mar 2025 16:00)  T(F): 98 (07 Mar 2025 08:00), Max: 98.6 (06 Mar 2025 16:00)  HR: 78 (07 Mar 2025 08:00) (77 - 106)  BP: 151/60 (07 Mar 2025 08:00) (142/59 - 163/62)  BP(mean): --  ABP: --  ABP(mean): --  RR: 15 (07 Mar 2025 08:00) (15 - 18)  SpO2: 100% (07 Mar 2025 08:00) (98% - 100%)    O2 Parameters below as of 07 Mar 2025 08:00  Patient On (Oxygen Delivery Method): ventilator    O2 Concentration (%): 40      Mode: AC/ CMV (Assist Control/ Continuous Mandatory Ventilation), RR (machine): 15, TV (machine): 400, FiO2: 40, PEEP: 6, ITime: 0.75, MAP: 10, PIP: 26    03-06 @ 07:01  -  03-07 @ 07:00  --------------------------------------------------------  IN: 580 mL / OUT: 0 mL / NET: 580 mL      CAPILLARY BLOOD GLUCOSE      POCT Blood Glucose.: 153 mg/dL (07 Mar 2025 06:00)      PHYSICAL EXAM:  General:   HEENT:   Lymph Nodes:  Neck:   Respiratory:   Cardiovascular:   Abdomen:   Extremities:   Skin:   Neurological:  Psychiatry:    Mode: AC/ CMV (Assist Control/ Continuous Mandatory Ventilation)  RR (machine): 15  TV (machine): 400  FiO2: 40  PEEP: 6  ITime: 0.75  MAP: 10  PIP: 26      HOSPITAL MEDICATIONS:  MEDICATIONS  (STANDING):  albuterol    0.083% 2.5 milliGRAM(s) Nebulizer every 6 hours  amLODIPine   Tablet 10 milliGRAM(s) Oral daily  apixaban 5 milliGRAM(s) Oral every 12 hours  AQUAPHOR (petrolatum Ointment) 1 Application(s) Topical two times a day  atorvastatin 20 milliGRAM(s) Oral at bedtime  chlorhexidine 0.12% Liquid 15 milliLiter(s) Oral Mucosa every 12 hours  chlorhexidine 2% Cloths 1 Application(s) Topical daily  dextrose 5%. 1000 milliLiter(s) (100 mL/Hr) IV Continuous <Continuous>  dextrose 5%. 1000 milliLiter(s) (50 mL/Hr) IV Continuous <Continuous>  dextrose 50% Injectable 25 Gram(s) IV Push once  dextrose 50% Injectable 12.5 Gram(s) IV Push once  dextrose 50% Injectable 25 Gram(s) IV Push once  epoetin reilly-epbx (RETACRIT) Injectable 77160 Unit(s) IV Push <User Schedule>  ferrous    sulfate Liquid 300 milliGRAM(s) Enteral Tube daily  glucagon  Injectable 1 milliGRAM(s) IntraMuscular once  hydrALAZINE 50 milliGRAM(s) Oral three times a day  insulin glargine Injectable (LANTUS) 13 Unit(s) SubCutaneous at bedtime  insulin lispro (ADMELOG) corrective regimen sliding scale   SubCutaneous every 6 hours  insulin lispro Injectable (ADMELOG) 5 Unit(s) SubCutaneous every 6 hours  Nephro-noam 1 Tablet(s) Oral daily  pantoprazole   Suspension 40 milliGRAM(s) Oral before breakfast  piperacillin/tazobactam IVPB.. 4.5 Gram(s) IV Intermittent every 12 hours  potassium chloride   Solution 10 milliEquivalent(s) Oral once  sodium chloride 0.9% for Nebulization 3 milliLiter(s) Nebulizer every 6 hours    MEDICATIONS  (PRN):  dextrose Oral Gel 15 Gram(s) Oral once PRN Blood Glucose LESS THAN 70 milliGRAM(s)/deciliter  sodium chloride 0.9% Bolus. 100 milliLiter(s) IV Bolus every 5 minutes PRN SBP LESS THAN or EQUAL to 80 mmHg      LABS:                        7.9    11.89 )-----------( 276      ( 07 Mar 2025 07:10 )             25.3     03-07    131[L]  |  91[L]  |  58[H]  ----------------------------<  132[H]  3.4[L]   |  26  |  3.24[H]    Ca    9.4      07 Mar 2025 07:10  Phos  3.0     03-07  Mg     2.90     03-07        Urinalysis Basic - ( 07 Mar 2025 07:10 )    Color: x / Appearance: x / SG: x / pH: x  Gluc: 132 mg/dL / Ketone: x  / Bili: x / Urobili: x   Blood: x / Protein: x / Nitrite: x   Leuk Esterase: x / RBC: x / WBC x   Sq Epi: x / Non Sq Epi: x / Bacteria: x         CHIEF COMPLAINT: Patient is a 74y old  Male who presents with a chief complaint of 2/18/25 was in cath lab earlier today with vascular surgery for fistulogram and noted to have a WBC of 18 and sent to ED and admitted (04 Mar 2025 12:22)      INTERVAL EVENTS: No acute overnight events    ROS: Seen by bedside during AM rounds     OBJECTIVE:  ICU Vital Signs Last 24 Hrs  T(C): 36.7 (07 Mar 2025 08:00), Max: 37 (06 Mar 2025 16:00)  T(F): 98 (07 Mar 2025 08:00), Max: 98.6 (06 Mar 2025 16:00)  HR: 78 (07 Mar 2025 08:00) (77 - 106)  BP: 151/60 (07 Mar 2025 08:00) (142/59 - 163/62)  BP(mean): --  ABP: --  ABP(mean): --  RR: 15 (07 Mar 2025 08:00) (15 - 18)  SpO2: 100% (07 Mar 2025 08:00) (98% - 100%)    O2 Parameters below as of 07 Mar 2025 08:00  Patient On (Oxygen Delivery Method): ventilator    O2 Concentration (%): 40      Mode: AC/ CMV (Assist Control/ Continuous Mandatory Ventilation), RR (machine): 15, TV (machine): 400, FiO2: 40, PEEP: 6, ITime: 0.75, MAP: 10, PIP: 26    03-06 @ 07:01  -  03-07 @ 07:00  --------------------------------------------------------  IN: 580 mL / OUT: 0 mL / NET: 580 mL      CAPILLARY BLOOD GLUCOSE      POCT Blood Glucose.: 153 mg/dL (07 Mar 2025 06:00)    PHYSICAL EXAM:  General: NAD, laying in bed with eyes closed,   HEENT: NC AT   Neck: Supple, trach midline connected to vent.   Respiratory: Coarse breath sounds b/l no wheezing   Cardiovascular: S1S2 RR  Abdomen: Nontender, nondistended, soft. Normoactive BS. +PEG  Extremities: No edema. No active ROM of b/l UE and LE. + R AVF + thrill   Skin: Warm/dry   Neurological: Arousable to touch, alert, tracks with eyes, does not respond to verbal commands.   Psychiatry: Normal affect, not agitated.      Mode: AC/ CMV (Assist Control/ Continuous Mandatory Ventilation)  RR (machine): 15  TV (machine): 400  FiO2: 40  PEEP: 6  ITime: 0.75  MAP: 10  PIP: 26      HOSPITAL MEDICATIONS:  MEDICATIONS  (STANDING):  albuterol    0.083% 2.5 milliGRAM(s) Nebulizer every 6 hours  amLODIPine   Tablet 10 milliGRAM(s) Oral daily  apixaban 5 milliGRAM(s) Oral every 12 hours  AQUAPHOR (petrolatum Ointment) 1 Application(s) Topical two times a day  atorvastatin 20 milliGRAM(s) Oral at bedtime  chlorhexidine 0.12% Liquid 15 milliLiter(s) Oral Mucosa every 12 hours  chlorhexidine 2% Cloths 1 Application(s) Topical daily  dextrose 5%. 1000 milliLiter(s) (100 mL/Hr) IV Continuous <Continuous>  dextrose 5%. 1000 milliLiter(s) (50 mL/Hr) IV Continuous <Continuous>  dextrose 50% Injectable 25 Gram(s) IV Push once  dextrose 50% Injectable 12.5 Gram(s) IV Push once  dextrose 50% Injectable 25 Gram(s) IV Push once  epoetin reilly-epbx (RETACRIT) Injectable 05766 Unit(s) IV Push <User Schedule>  ferrous    sulfate Liquid 300 milliGRAM(s) Enteral Tube daily  glucagon  Injectable 1 milliGRAM(s) IntraMuscular once  hydrALAZINE 50 milliGRAM(s) Oral three times a day  insulin glargine Injectable (LANTUS) 13 Unit(s) SubCutaneous at bedtime  insulin lispro (ADMELOG) corrective regimen sliding scale   SubCutaneous every 6 hours  insulin lispro Injectable (ADMELOG) 5 Unit(s) SubCutaneous every 6 hours  Nephro-noam 1 Tablet(s) Oral daily  pantoprazole   Suspension 40 milliGRAM(s) Oral before breakfast  piperacillin/tazobactam IVPB.. 4.5 Gram(s) IV Intermittent every 12 hours  potassium chloride   Solution 10 milliEquivalent(s) Oral once  sodium chloride 0.9% for Nebulization 3 milliLiter(s) Nebulizer every 6 hours    MEDICATIONS  (PRN):  dextrose Oral Gel 15 Gram(s) Oral once PRN Blood Glucose LESS THAN 70 milliGRAM(s)/deciliter  sodium chloride 0.9% Bolus. 100 milliLiter(s) IV Bolus every 5 minutes PRN SBP LESS THAN or EQUAL to 80 mmHg      LABS:                        7.9    11.89 )-----------( 276      ( 07 Mar 2025 07:10 )             25.3     03-07    131[L]  |  91[L]  |  58[H]  ----------------------------<  132[H]  3.4[L]   |  26  |  3.24[H]    Ca    9.4      07 Mar 2025 07:10  Phos  3.0     03-07  Mg     2.90     03-07        Urinalysis Basic - ( 07 Mar 2025 07:10 )    Color: x / Appearance: x / SG: x / pH: x  Gluc: 132 mg/dL / Ketone: x  / Bili: x / Urobili: x   Blood: x / Protein: x / Nitrite: x   Leuk Esterase: x / RBC: x / WBC x   Sq Epi: x / Non Sq Epi: x / Bacteria: x

## 2025-03-07 NOTE — PROGRESS NOTE ADULT - ASSESSMENT
74-year-old male hx trach/vent - , CVA, COPD, stage renal disease on dialysis 4 times a week (MTRF) history of pressure ulcer.  Patient presents the ED today for elevated white count. nephrology consulted for dialysis needs    ESRD:  from [facility]  On HD MTTsF via an A-V fistula. s/p fistulogram by vascular 2/18  Continue MWF in hospital  S/p HD on 3/5 and 3/7.  Prolonged bleeding post HD -- vascular consulted.  Pending duplex of AVF.  consent from wife in dialysis unit  monitor bmp    Hypertension  BP fluctuating; acceptable.  continue norvasc 10 daily and hydralazine increased to 50 tid 3/2  titrate hydralazine to 100mg TID if needed  max uf as tolerated  monitor BP    Anemia:  Transfuse for Hg < 7.  s/p 1 unit PRBC 2/23  epo with hd  iron sat >20  monitor Hgb    leukocytosis  sepsis work up per team  On Zosyn.    hyponatremia  in setting of esrd and hyperglycemia  optimize dm control  hd per schedule with UF  monitor Na    Hypokalemia  Dialyze w/ 3K bath as needed.  Monitor K.    Hypermagnesemia  HD per schedule.  Monitor Mg.

## 2025-03-07 NOTE — PROGRESS NOTE ADULT - SUBJECTIVE AND OBJECTIVE BOX
Chief Complaint: type 2 DM    History: saw pt at bedside, pt getting HD at bedside. Pt trach/PEG tolerating bolus tube feeds. FS at goal today. FS prior to afternoon bolus at goal- in orders premeal was marked as held- called and d/w provider to not hold - pt was in the middle of getting bolus feed so instructed to give 1x order of 4 units for now for this afternoon dose.     MEDICATIONS  (STANDING):  albuterol    0.083% 2.5 milliGRAM(s) Nebulizer every 6 hours  amLODIPine   Tablet 10 milliGRAM(s) Oral daily  apixaban 5 milliGRAM(s) Oral every 12 hours  AQUAPHOR (petrolatum Ointment) 1 Application(s) Topical two times a day  atorvastatin 20 milliGRAM(s) Oral at bedtime  chlorhexidine 0.12% Liquid 15 milliLiter(s) Oral Mucosa every 12 hours  chlorhexidine 2% Cloths 1 Application(s) Topical daily  dextrose 5%. 1000 milliLiter(s) (100 mL/Hr) IV Continuous <Continuous>  dextrose 5%. 1000 milliLiter(s) (50 mL/Hr) IV Continuous <Continuous>  dextrose 50% Injectable 25 Gram(s) IV Push once  dextrose 50% Injectable 12.5 Gram(s) IV Push once  dextrose 50% Injectable 25 Gram(s) IV Push once  epoetin reilly-epbx (RETACRIT) Injectable 40533 Unit(s) IV Push <User Schedule>  ferrous    sulfate Liquid 300 milliGRAM(s) Enteral Tube daily  glucagon  Injectable 1 milliGRAM(s) IntraMuscular once  hydrALAZINE 50 milliGRAM(s) Oral three times a day  insulin glargine Injectable (LANTUS) 13 Unit(s) SubCutaneous at bedtime  insulin lispro (ADMELOG) corrective regimen sliding scale   SubCutaneous every 6 hours  insulin lispro Injectable (ADMELOG) 5 Unit(s) SubCutaneous every 6 hours  Nephro-noam 1 Tablet(s) Oral daily  pantoprazole   Suspension 40 milliGRAM(s) Oral before breakfast  piperacillin/tazobactam IVPB.. 4.5 Gram(s) IV Intermittent every 12 hours  sodium chloride 0.9% for Nebulization 3 milliLiter(s) Nebulizer every 6 hours    MEDICATIONS  (PRN):  dextrose Oral Gel 15 Gram(s) Oral once PRN Blood Glucose LESS THAN 70 milliGRAM(s)/deciliter  sodium chloride 0.9% Bolus. 100 milliLiter(s) IV Bolus every 5 minutes PRN SBP LESS THAN or EQUAL to 80 mmHg      Allergies    No Known Allergies    Intolerances      Review of Systems:    UNABLE TO OBTAIN    PHYSICAL EXAM:  VITALS: T(C): 36.6 (03-07-25 @ 12:00)  T(F): 97.8 (03-07-25 @ 12:00), Max: 98.6 (03-06-25 @ 16:00)  HR: 87 (03-07-25 @ 12:00) (77 - 106)  BP: 161/61 (03-07-25 @ 12:00) (142/59 - 163/62)  RR:  (15 - 18)  SpO2:  (98% - 100%)  Wt(kg): --  GENERAL: NAD  EYES: No proptosis  RESPIRATORY: non labored breathing- trach/ventilator  CARDIOVASCULAR: Regular rate and rhythm  GI: PEG on TF- tolerating bolus feeds      CAPILLARY BLOOD GLUCOSE      POCT Blood Glucose.: 116 mg/dL (07 Mar 2025 11:09)  POCT Blood Glucose.: 153 mg/dL (07 Mar 2025 06:00)  POCT Blood Glucose.: 198 mg/dL (06 Mar 2025 23:35)  POCT Blood Glucose.: 175 mg/dL (06 Mar 2025 18:03)      03-07    131[L]  |  91[L]  |  58[H]  ----------------------------<  132[H]  3.4[L]   |  26  |  3.24[H]    eGFR: 20[L]    Ca    9.4      03-07  Mg     2.90     03-07  Phos  3.0     03-07            Thyroid Function Tests:        A1C with Estimated Average Glucose Result: 6.4 % (02-19-25 @ 06:45)  A1C with Estimated Average Glucose Result: 4.8 % (10-05-24 @ 08:37)  A1C with Estimated Average Glucose Result: 5.9 % (07-16-24 @ 10:25)  A1C with Estimated Average Glucose Result: 6.9 % (04-30-24 @ 06:03)          Diet, NPO with Tube Feed:   Tube Feeding Modality: Gastrostomy  Nepro with Carb Steady (NEPRORTH)  Total Volume for 24 Hours (mL): 1160  Bolus  Total Volume of Bolus (mL):  290  Total # of Feeds: 4  Tube Feed Frequency: Every 6 hours   Tube Feed Start Time: 12:00  Bolus Feed Rate (mL per Hour): 290   Bolus Feed Duration (in Hours): 0  Liquid Protein Supplement     Qty per Day:  1 (02-25-25 @ 13:43)

## 2025-03-07 NOTE — PROGRESS NOTE ADULT - ASSESSMENT
74M PMHx HTN, IDDM2, CVA x2 ( with R. sided weakness and dysphagia s/p PEG tube), bedbound with multiple pressure ulcers, hx of RLE DVT (s/p apixaban course), recent admission for respiratory failure s/p tracheostomy (10/23/24), ESRD on HD (MWF) now s/p RUE fistulogram with angioplasty (02/18/25, [facility]) found to have profound hyperglycemia, anemia, and leukocytosis admitted to RCU. Had duplex showing DVT in R brachial vein, currently on Eliquis. Reconsulted for prolonged bleeding after HD.     Recs:  - please obtain fistula duplex  - therapeutic AC for DVT   - global care per primary team    Vascular Surgery  [id number]. 74M PMHx HTN, IDDM2, CVA x2 ( with R. sided weakness and dysphagia s/p PEG tube), bedbound with multiple pressure ulcers, hx of RLE DVT (s/p apixaban course), recent admission for respiratory failure s/p tracheostomy (10/23/24), ESRD on HD ([facility]) now s/p RUE fistulogram with angioplasty (02/18/25, [facility]) found to have profound hyperglycemia, anemia, and leukocytosis admitted to RCU. Had duplex showing DVT in R brachial vein, currently on Eliquis. Reconsulted for prolonged bleeding after HD. Palpable thrill noted on AVF and no bleeding on physical exam.  RUE brachial vein thrombus is probably chronic in nature and pt does not need to be placed on AC, which could be a cause of prolonged bleeding after HD    Recs:  - please obtain fistula duplex to evaluate for AVF  - from vascular standpoint, can discontinue Eliquis  and start on DVT ppx  - continue to use AVF for HD  - global care per primary team    Vascular Surgery  [id number].

## 2025-03-07 NOTE — PROGRESS NOTE ADULT - SUBJECTIVE AND OBJECTIVE BOX
[facility]   MD ELIANE BHAGAT MD ANGELA WONG, PA QIAN CHEN, NP    TEL:  OFFICE: [phone]  From 5pm-7am Answering Service [phone]    -- RENAL FOLLOW UP NOTE ---Date of Service 03-07-25 @ 16:50    Patient is a 74y old  Male who presents with a chief complaint of leukocytosis.    Patient seen and examined at bedside.  Trached to vent; no apparent distress.    VITALS:  T(F): 98.3 (03-07-25 @ 16:00), Max: 98.4 (03-07-25 @ 05:27)  HR: 93 (03-07-25 @ 16:00)  BP: 159/61 (03-07-25 @ 16:00)  RR: 19 (03-07-25 @ 16:00)  SpO2: 100% (03-07-25 @ 16:00)  Wt(kg): --    03-06 @ 07:01  -  03-07 @ 07:00  --------------------------------------------------------  IN: 580 mL / OUT: 0 mL / NET: 580 mL    03-07 @ 07:01  -  03-07 @ 16:50  --------------------------------------------------------  IN: 600 mL / OUT: 2600 mL / NET: -2000 mL      PHYSICAL EXAM:  General: NAD  Neck: No JVD  Respiratory: CTAB, no wheezes, rales or rhonchi.  Trached to vent; no respiratory distress.  Cardiovascular: S1, S2, RRR  Gastrointestinal: BS+, soft, NT/ND  Extremities: + swelling to b/l UE.    Hospital Medications:   MEDICATIONS  (STANDING):  albuterol    0.083% 2.5 milliGRAM(s) Nebulizer every 6 hours  amLODIPine   Tablet 10 milliGRAM(s) Oral daily  apixaban 5 milliGRAM(s) Oral every 12 hours  AQUAPHOR (petrolatum Ointment) 1 Application(s) Topical two times a day  atorvastatin 20 milliGRAM(s) Oral at bedtime  chlorhexidine 0.12% Liquid 15 milliLiter(s) Oral Mucosa every 12 hours  chlorhexidine 2% Cloths 1 Application(s) Topical daily  dextrose 5%. 1000 milliLiter(s) (100 mL/Hr) IV Continuous <Continuous>  dextrose 5%. 1000 milliLiter(s) (50 mL/Hr) IV Continuous <Continuous>  dextrose 50% Injectable 25 Gram(s) IV Push once  dextrose 50% Injectable 12.5 Gram(s) IV Push once  dextrose 50% Injectable 25 Gram(s) IV Push once  epoetin reilly-epbx (RETACRIT) Injectable 33927 Unit(s) IV Push <User Schedule>  ferrous    sulfate Liquid 300 milliGRAM(s) Enteral Tube daily  glucagon  Injectable 1 milliGRAM(s) IntraMuscular once  hydrALAZINE 50 milliGRAM(s) Oral three times a day  insulin glargine Injectable (LANTUS) 13 Unit(s) SubCutaneous at bedtime  insulin lispro (ADMELOG) corrective regimen sliding scale   SubCutaneous every 6 hours  insulin lispro Injectable (ADMELOG) 5 Unit(s) SubCutaneous every 6 hours  Nephro-noam 1 Tablet(s) Oral daily  pantoprazole   Suspension 40 milliGRAM(s) Oral before breakfast  piperacillin/tazobactam IVPB.. 4.5 Gram(s) IV Intermittent every 12 hours  sodium chloride 0.9% for Nebulization 3 milliLiter(s) Nebulizer every 6 hours      LABS:  03-07    131[L]  |  91[L]  |  58[H]  ----------------------------<  132[H]  3.4[L]   |  26  |  3.24[H]    Ca    9.4      07 Mar 2025 07:10  Phos  3.0     03-07  Mg     2.90     03-07      Creatinine Trend: 3.24 <--, 2.47 <--, 3.47 <--, 2.80 <--, 2.24 <--, 3.49 <--, 2.98 <--, 2.74 <--, 2.04 <--    Phosphorus: 3.0 mg/dL (03-07 @ 07:10)                          7.9    11.89 )-----------( 276      ( 07 Mar 2025 07:10 )             25.3     Urine Studies:  Urinalysis - [03-07-25 @ 07:10]      Color  / Appearance  / SG  / pH       Gluc 132 / Ketone   / Bili  / Urobili        Blood  / Protein  / Leuk Est  / Nitrite       RBC  / WBC  / Hyaline  / Gran  / Sq Epi  / Non Sq Epi  / Bacteria       Iron 46, TIBC 142, %sat 32      [02-19-25 @ 11:39]  Ferritin 3601      [02-19-25 @ 11:39]  PTH -- (Ca --)      [05-26-24 @ 01:20]   122  TSH 1.660      [10-05-24 @ 08:37]  Lipid: chol --, , HDL --, LDL --      [10-18-24 @ 06:00]    HBsAb 654.8      [02-19-25 @ 19:05]  HBsAg Nonreact      [02-19-25 @ 19:05]  HBcAb Nonreact      [02-19-25 @ 19:05]  HCV 0.19, Nonreact      [02-19-25 @ 19:05]

## 2025-03-07 NOTE — PROGRESS NOTE ADULT - SUBJECTIVE AND OBJECTIVE BOX
[facility]  AUGUST Carbajal Y. Patel, S. Shah, G. Casimir  [phone]  ([phone] - weekdays after 5pm and weekends)    Name: JIMMY BLAND  Age/Gender: 74y Male  MRN: [id number]    Interval History:  Notes reviewed.   Afebrile.   remains on vent via trach    Allergies: No Known Allergies      Objective:  Vitals:   T(F): 98 (03-07-25 @ 08:00), Max: 98.6 (03-06-25 @ 16:00)  HR: 102 (03-07-25 @ 09:17) (77 - 106)  BP: 151/60 (03-07-25 @ 08:00) (142/59 - 163/62)  RR: 15 (03-07-25 @ 08:00) (15 - 18)  SpO2: 100% (03-07-25 @ 09:17) (98% - 100%)  Physical Examination:  General: frail appearing  HEENT: anicteric, tracheostomy, on vent  Cardio: S1, S2, normal rate  Resp: decreased breath sounds  Abd: Soft. Nondistended. PEG  Ext: no LE edema  Skin: warm, dry    Laboratory Studies:  CBC:                       7.9    11.89 )-----------( 276      ( 07 Mar 2025 07:10 )             25.3     WBC Trend:  11.89 03-07-25 @ 07:10  13.03 03-06-25 @ 05:45  16.30 03-05-25 @ 11:34  13.65 03-03-25 @ 07:00  13.96 03-02-25 @ 09:09  15.75 03-02-25 @ 01:38  15.52 03-01-25 @ 06:05  17.77 02-28-25 @ 13:30    CMP: 03-07    131[L]  |  91[L]  |  58[H]  ----------------------------<  132[H]  3.4[L]   |  26  |  3.24[H]    Ca    9.4      07 Mar 2025 07:10  Phos  3.0     03-07  Mg     2.90     03-07            Urinalysis Basic - ( 07 Mar 2025 07:10 )    Color: x / Appearance: x / SG: x / pH: x  Gluc: 132 mg/dL / Ketone: x  / Bili: x / Urobili: x   Blood: x / Protein: x / Nitrite: x   Leuk Esterase: x / RBC: x / WBC x   Sq Epi: x / Non Sq Epi: x / Bacteria: x      Microbiology: reviewed     Culture - Blood (collected 03-03-25 @ 16:27)  Source: Blood Blood-Peripheral  Preliminary Report (03-06-25 @ 22:01):    No growth at 72 Hours    Culture - Blood (collected 03-03-25 @ 16:23)  Source: Blood Blood-Venous  Preliminary Report (03-06-25 @ 22:01):    No growth at 72 Hours    Culture - Sputum (collected 03-03-25 @ 14:10)  Source: Trach Asp Tracheal Aspirate  Gram Stain (03-04-25 @ 04:28):    Few polymorphonuclear leukocytes per low power field    No Squamous epithelial cells per low power field    Moderate Gram Negative Rods seen per oil power field  Final Report (03-05-25 @ 16:12):    Numerous Pseudomonas aeruginosa (Carbapenem Resistant)    Commensal dominick consistent with body site  Organism: Pseudomonas aeruginosa (Carbapenem Resistant) (03-05-25 @ 16:12)  Organism: Pseudomonas aeruginosa (Carbapenem Resistant) (03-05-25 @ 16:12)      Method Type: CarbaR      -  Resistance Gene to Carbapenem: Nondet  Organism: Pseudomonas aeruginosa (Carbapenem Resistant) (03-05-25 @ 16:12)      Method Type: VIDA      -  Aztreonam: S <=4      -  Cefepime: S <=2      -  Ceftazidime: S 4      -  Ceftazidime/Avibactam: S <=4      -  Ceftolozane/tazobactam: S <=2      -  Ciprofloxacin: S <=0.25      -  Imipenem: R 8      -  Levofloxacin: S <=0.5      -  Meropenem: R 8      -  Piperacillin/Tazobactam: S <=8    Culture - Fungal, Bronchial (collected 02-26-25 @ 16:43)  Source: Bronchial Bronchial Lavage  Preliminary Report (03-05-25 @ 23:02):    No fungus isolated at 1 week.    Culture - Bronchial (collected 02-26-25 @ 16:43)  Source: Bronchial Bronchial Lavage  Gram Stain (02-26-25 @ 23:04):    Few polymorphonuclear leukocytes per low power field    No squamous epithelial cells    Few Gram Negative Rods per oil power field  Final Report (02-28-25 @ 17:24):    Few Pseudomonas aeruginosa    Commensal dominick consistent with body site  Organism: Pseudomonas aeruginosa (02-28-25 @ 17:24)  Organism: Pseudomonas aeruginosa (02-28-25 @ 17:24)      Method Type: VIDA      -  Aztreonam: S <=4      -  Cefepime: S <=2      -  Ceftazidime: S <=1      -  Ciprofloxacin: S <=0.25      -  Imipenem: S 2      -  Levofloxacin: S <=0.5      -  Meropenem: S <=1      -  Piperacillin/Tazobactam: S <=8        Radiology: reviewed     Medications:  albuterol    0.083% 2.5 milliGRAM(s) Nebulizer every 6 hours  amLODIPine   Tablet 10 milliGRAM(s) Oral daily  apixaban 5 milliGRAM(s) Oral every 12 hours  AQUAPHOR (petrolatum Ointment) 1 Application(s) Topical two times a day  atorvastatin 20 milliGRAM(s) Oral at bedtime  chlorhexidine 0.12% Liquid 15 milliLiter(s) Oral Mucosa every 12 hours  chlorhexidine 2% Cloths 1 Application(s) Topical daily  dextrose 5%. 1000 milliLiter(s) IV Continuous <Continuous>  dextrose 5%. 1000 milliLiter(s) IV Continuous <Continuous>  dextrose 50% Injectable 25 Gram(s) IV Push once  dextrose 50% Injectable 12.5 Gram(s) IV Push once  dextrose 50% Injectable 25 Gram(s) IV Push once  dextrose Oral Gel 15 Gram(s) Oral once PRN  epoetin reilly-epbx (RETACRIT) Injectable 28357 Unit(s) IV Push <User Schedule>  ferrous    sulfate Liquid 300 milliGRAM(s) Enteral Tube daily  glucagon  Injectable 1 milliGRAM(s) IntraMuscular once  hydrALAZINE 50 milliGRAM(s) Oral three times a day  insulin glargine Injectable (LANTUS) 13 Unit(s) SubCutaneous at bedtime  insulin lispro (ADMELOG) corrective regimen sliding scale   SubCutaneous every 6 hours  insulin lispro Injectable (ADMELOG) 5 Unit(s) SubCutaneous every 6 hours  Nephro-noam 1 Tablet(s) Oral daily  pantoprazole   Suspension 40 milliGRAM(s) Oral before breakfast  piperacillin/tazobactam IVPB.. 4.5 Gram(s) IV Intermittent every 12 hours  sodium chloride 0.9% Bolus. 100 milliLiter(s) IV Bolus every 5 minutes PRN  sodium chloride 0.9% for Nebulization 3 milliLiter(s) Nebulizer every 6 hours    Antimicrobials:  piperacillin/tazobactam IVPB.. 4.5 Gram(s) IV Intermittent every 12 hours

## 2025-03-07 NOTE — PROGRESS NOTE ADULT - NS ATTEND AMEND GEN_ALL_CORE FT
agree with above  abx through 3/10  HD today . bleeding from fistula - vascular coming to [facility];  d/c plan for Monday if stable

## 2025-03-07 NOTE — PROGRESS NOTE ADULT - ASSESSMENT
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 Patent recently admitted in 6/2024 for left pontine and cerebellar CVA requiring tracheostomy. While at [facility] patient decannulated and passed SS with advanced diet to easy to chew with thin liquids, but complicated COVID requiring transfer to [facility] in 7/2024 and then ultimately discharged home. Per wife patient was doing well at home until 10/2024 when he was found with RLL with concern for aspiration requiring intubation, then tracheostomy, and ultimately discharged to NH with vent dependence in 11/2024. Patient now presented for RUE fistulogram and angioplasty with vascular, however course complicated by leukocytosis with concern for recurrent trachitis vs PNA and UTI, AOCD and hyperglycemia. Admitted to RCU for further management. Found with  PSA trachitis/ PNA and enterococcus faecium UTI. Course complicated by RUE brachial DVT and hypoxia with HD with concern for volume down vs PE?      NEUROLOGY  # Poor mentation second to metabolic encephalopathy vs psychosomatic/ depression   - Hx of L cerebellar and pontene embolic CVA x 2 with residual R hemiplegia   - AOx0, bedbound, and nonverbal at baseline per report, however per exam patient able to open eyes, able to follow commands on left (LUE>LLE) with SEVERE weakness, however noted with R hemiplegia per baseline  - Nods yes and no occasionally however keeps eyes closed likely psychosomatic vs metabolic encephalopathy with infections?   - Last CTH in 10/2024 with no acute findings   - Hold [facility] CT for now   - Monitor mentation     CARDIOVASCULAR  # Hx of HTN and HLD  - Continue on hydral 50 (increased 3/2) and norvasc 10   - Monitor BP     # Incidental Pericardial effusion   - Incidental small pericardial effusion seen adjacent to right ventricle, however IVC appears flat and LE without edema with low concern for RV failure.   - Prior TTE reviewed from 4/2024 with normal RVLVSF with no pericardial effusion noted at the time.   - TTE 2/23 with EF 65, normal LVRVSF, mild LAD, normal RA, mild pulmonary hypertension, and small pericardial effusion noted adjacent to the anterior right ventricle with no echocardiographic evidence of tamponade  - Monitor hemodynamics     RESPIRATORY  # Respiratory failure second to PNA vs volume down vs PE?   - Hx of COPD with chronic respiratory failure with tracheostomy and vent dependence  - Admitted for angioplasty of RUE AVF, however course complicated by leukocytosis with concern for recurrent trachitis/ PNA.   - Course complicated by hypoxia during HD likely volume down vs migration of RUE brachial DVT with PE?   - Held volume removal, bolus given, and hypoxia improved.   - CTA CHEST without PE  - Course complicated by LLL mucous plug and IPV started with improvement   - Continue on albuterol, NS 0.9 and IPV  - Continue on vent 15/400/6/40  - Hold PS for now    HEENT   # Hemoptysis   - Wife reported hemoptysis while at nursing home 2 weeks prior to admission   - No hemoptysis noted on admission   - Hemoptysis returned in house   - CTA CHEST 2/24 without PE  - Bronch 2/26 with no evidence of bleeding   - Bleeding resolved     GI  # Dysphagia with PEG   - Passed SS with advanced diet to easy to chew with thin liquids in 7/2024, however since recurrent aspiration in 10/2024 now with PEG/ vent dependence   - Continue on PEG TF and changed to bolus feeds   - Monitor tolerance     RENAL  # ESRD on HD   # AVF trouble  # Dehydration   - Presented for RUE fistulogram and angioplasty with vascular on 2/18   - Resumed on HD with no flow issue from AVF   - Course complicated by hypoxia with concern for volume down given small IVC and elevated lactate on 2/21  - Volume removal held, lactate improved, and hypoxia improved.   - Monitor volume status and continue on HD MWF in house per renal   - Return to HD MTRF outpatient   - Monitor renal function, electrolytes and UOP     # Hyponatremia likely volume down   - Concern for volume issues, however overall appears volume down with serum osmo 307  - Sodium 128 and improved with HD/ bolus during HD.   - Trend sodium with HD for now     # Elevated lactate   - Lactate elevated likely second to volume down?   - Post bolus lactate trending down from 3.3 to 2.7 to 1.3    INFECTIOUS DISEASE  # Recurrent LLL PNA   - CXR with LLL mucous plug   - POCUS with LLL consolidation vs atelectasis   - Recent BAL with PSA as below   - FULL RVP negative  - Fevers returned and zosyn (3/3 -3/10 ) started empirically   - Zosyn dose increased 3/5 to 4.5 g Q6H   - RPT BCx NGTD and SCx with PSO    # Hemoptysis   - Hemoptysis as above   - BAL with PSA as prior   - No fever and monitor off ABX     # Trachitis vs PNA/ UTI   - Hx of steno and PSA in sputum   - Flu/ COVID negative   - MRSA negative   - UCx with enterococcus   - SCx with  PSA   - Found with leukocytosis and started on cefepime and christiano (2/19) and vanco on HD days (2/21).   - No further steno seen in sputum and continue on cefepime (2/19 - 2/25) and vanco on HD days (2/21, 2/22 - 2/25)   - WBC increased likely reactive to hypoxic event and now improving  - ID following     # PPX   - MRSA PCR negative  - Candida auris PCR POSITIVE  - Continue on isolation precautions per IC    HEME  # AOCD   - Presented for angioplasty and found with anemia to 6.7 s/p 1U PRBC 2/19 and 1/2 PRBC 2/23   - Anemia panel with concern for AOCD   - Continue on EPO QIW and Fe   - Monitor HH     # Heparin resistance?   - PTT persistently low despite increasing heparin GTT   - Resume on heparin GTT and anti Xa level supra therapeutic on but PTT remained low  - Patient ultimately with concern for heparin resistance    VASCULAR  # RUE DVT   - RUE edema noted with age-indeterminate, non-occlusive deep vein thrombosis noted in the right brachial vein.   - Case discussed with vascular who recommends full AC   - CTH from prior with encephalomalcia, however no hx of intracranial bleed   - Prior UGIB while on AC, however discharged on eliquis in 7/2024 and completed 6 month course for RLE DVT   - Continue on heparin GTT, however held for hemoptysis and resistance   - Continued on argatroban with good tolerance   - Change to eliquis 3/3     ENDOCRINE  # DM2 A1C 14.0 (1/2024) > 6.4 (2/2025)  - Presented with hyperglycemia and continued on lantus and ISS   - Increased lantus, however FS continues to trend in 200s and changed to NPH with improvement.  - TF interrupted for bronchoscopy and adjustment to bolus feeds and FS dropped.   - NPH stopped and FS improved, however trended back up with continuation of tube feeds.   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 Patent recently admitted in 6/2024 for left pontine and cerebellar CVA requiring tracheostomy. While at [facility] patient decannulated and passed SS with advanced diet to easy to chew with thin liquids, but complicated COVID requiring transfer to [facility] in 7/2024 and then ultimately discharged home. Per wife patient was doing well at home until 10/2024 when he was found with RLL with concern for aspiration requiring intubation, then tracheostomy, and ultimately discharged to NH with vent dependence in 11/2024. Patient now presented for RUE fistulogram and angioplasty with vascular, however course complicated by leukocytosis with concern for recurrent trachitis vs PNA and UTI, AOCD and hyperglycemia. Admitted to RCU for further management. Found with  PSA trachitis/ PNA and enterococcus faecium UTI. Course complicated by RUE brachial DVT and hypoxia with HD with concern for volume down vs PE?      NEUROLOGY  # Poor mentation second to metabolic encephalopathy vs psychosomatic/ depression   - Hx of L cerebellar and pontene embolic CVA x 2 with residual R hemiplegia   - AOx0, bedbound, and nonverbal at baseline per report, however per exam patient able to open eyes, able to follow commands on left (LUE>LLE) with SEVERE weakness, however noted with R hemiplegia per baseline  - Nods yes and no occasionally however keeps eyes closed likely psychosomatic vs metabolic encephalopathy with infections?   - Last CTH in 10/2024 with no acute findings   - Hold [facility] CT for now   - Monitor mentation     CARDIOVASCULAR  # Hx of HTN and HLD  - Continue on hydral 50 (increased 3/2) and norvasc 10   - Monitor BP     # Incidental Pericardial effusion   - Incidental small pericardial effusion seen adjacent to right ventricle, however IVC appears flat and LE without edema with low concern for RV failure.   - Prior TTE reviewed from 4/2024 with normal RVLVSF with no pericardial effusion noted at the time.   - TTE 2/23 with EF 65, normal LVRVSF, mild LAD, normal RA, mild pulmonary hypertension, and small pericardial effusion noted adjacent to the anterior right ventricle with no echocardiographic evidence of tamponade  - Monitor hemodynamics     RESPIRATORY  # Respiratory failure second to PNA vs volume down vs PE?   - Hx of COPD with chronic respiratory failure with tracheostomy and vent dependence  - Admitted for angioplasty of RUE AVF, however course complicated by leukocytosis with concern for recurrent trachitis/ PNA.   - Course complicated by hypoxia during HD likely volume down vs migration of RUE brachial DVT with PE?   - Held volume removal, bolus given, and hypoxia improved.   - CTA CHEST without PE  - Course complicated by LLL mucous plug and IPV started with improvement   - Continue on albuterol, NS 0.9 and IPV  - Continue on vent 15/400/6/40  - Hold PS for now    HEENT   # Hemoptysis   - Wife reported hemoptysis while at nursing home 2 weeks prior to admission   - No hemoptysis noted on admission   - Hemoptysis returned in house   - CTA CHEST 2/24 without PE  - Bronch 2/26 with no evidence of bleeding   - Bleeding resolved     GI  # Dysphagia with PEG   - Passed SS with advanced diet to easy to chew with thin liquids in 7/2024, however since recurrent aspiration in 10/2024 now with PEG/ vent dependence   - Continue on PEG TF and changed to bolus feeds   - Monitor tolerance     RENAL  # ESRD on HD   # AVF trouble  # Dehydration   - Presented for RUE fistulogram and angioplasty with vascular on 2/18   - Resumed on HD with no flow issue from AVF   - Course complicated by hypoxia with concern for volume down given small IVC and elevated lactate on 2/21  - Volume removal held, lactate improved, and hypoxia improved.   - Monitor volume status and continue on HD MWF in house per renal   - Return to HD MTRF outpatient   - Monitor renal function, electrolytes and UOP   - + oozing from AVF fistula needle site - vascular contacted to see pt with recs to dc Eiquis as DVT is chronic and does not require therapeutic AC but can be switched to DVT ppx .  Clarified that original rec was for full AC - but now no need for Eliquis.. Venous duplex of fistula ordered     # Hyponatremia likely volume down   - Concern for volume issues, however overall appears volume down with serum osmo 307  - Sodium 128 and improved with HD/ bolus during HD.   - Trend sodium with HD for now     # Elevated lactate   - Lactate elevated likely second to volume down?   - Post bolus lactate trending down from 3.3 to 2.7 to 1.3    INFECTIOUS DISEASE  # Recurrent LLL PNA   - CXR with LLL mucous plug   - POCUS with LLL consolidation vs atelectasis   - Recent BAL with PSA as below   - FULL RVP negative  - Fevers returned and zosyn (3/3 -3/10 ) started empirically   - Zosyn dose increased 3/5 to 4.5 g Q6H   - RPT BCx NGTD and SCx with PSO    # Hemoptysis   - Hemoptysis as above   - BAL with PSA as prior   - No fever and monitor off ABX     # Trachitis vs PNA/ UTI   - Hx of steno and PSA in sputum   - Flu/ COVID negative   - MRSA negative   - UCx with enterococcus   - SCx with  PSA   - Found with leukocytosis and started on cefepime and christiano (2/19) and vanco on HD days (2/21).   - No further steno seen in sputum and continue on cefepime (2/19 - 2/25) and vanco on HD days (2/21, 2/22 - 2/25)   - WBC increased likely reactive to hypoxic event and now improving  - ID following     # PPX   - MRSA PCR negative  - Candida auris PCR POSITIVE  - Continue on isolation precautions per IC    HEME  # AOCD   - Presented for angioplasty and found with anemia to 6.7 s/p 1U PRBC 2/19 and 1/2 PRBC 2/23   - Anemia panel with concern for AOCD   - Continue on EPO QIW and Fe   - Monitor HH     # Heparin resistance?   - PTT persistently low despite increasing heparin GTT   - Resume on heparin GTT and anti Xa level supra therapeutic on but PTT remained low  - Patient ultimately with concern for heparin resistance    VASCULAR  # RUE DVT   - RUE edema noted with age-indeterminate, non-occlusive deep vein thrombosis noted in the right brachial vein.   - Case discussed with vascular who recommends full AC   - CTH from prior with encephalomalcia, however no hx of intracranial bleed   - Prior UGIB while on AC, however discharged on eliquis in 7/2024 and completed 6 month course for RLE DVT   - Continue on heparin GTT, however held for hemoptysis and resistance   - Continued on argatroban with good tolerance   - Seen by vascular for bleeding from AVF site post HD.  Per vascular no further need for full AC as Thrombus is chronic and change to DVT ppx     ENDOCRINE  # DM2 A1C 14.0 (1/2024) > 6.4 (2/2025)  - Presented with hyperglycemia and continued on lantus and ISS   - Increased lantus, however FS continues to trend in 200s and changed to NPH with improvement.  - TF interrupted for bronchoscopy and adjustment to bolus feeds and FS dropped.   - NPH stopped and FS improved, however trended back up with continuation of tube feeds.   - NPH resumed, however FS continues to waxe and wane and case discussed with endocrine   - Insulin changed back to lantus 12 and lispro 4 with ISS for now   - Monitor FS and adjust as needed  - Endocrine following     ETHICS/ GOC    - FULL CODE   - Wife wishes for palliative discussion pending    DISPO - Back to NH when able

## 2025-03-07 NOTE — PROGRESS NOTE ADULT - SUBJECTIVE AND OBJECTIVE BOX
C Team Surgery ([id number])    SUBJECTIVE:  Recalled due to prolonged bleeding noted after HD today. Patient unable to participate in subjective.     OBJECTIVE:  Vital Signs Last 24 Hrs  T(C): 36.6 (07 Mar 2025 12:00), Max: 37 (06 Mar 2025 16:00)  T(F): 97.8 (07 Mar 2025 12:00), Max: 98.6 (06 Mar 2025 16:00)  HR: 100 (07 Mar 2025 14:42) (77 - 106)  BP: 161/61 (07 Mar 2025 12:00) (142/59 - 163/62)  BP(mean): --  RR: 18 (07 Mar 2025 12:00) (15 - 18)  SpO2: 100% (07 Mar 2025 14:42) (98% - 100%)    Parameters below as of 07 Mar 2025 12:00  Patient On (Oxygen Delivery Method): ventilator    O2 Concentration (%): 40    Physical Examination:  GEN: NAD, resting quietly  VASCULAR: palpable radial pulse, aneurysmal AVF with palpable thrill, contracted, R hand warm, unable to cooperate with motor exam       C Team Surgery ([id number])    SUBJECTIVE:  Recalled due to prolonged bleeding noted after HD today. Patient unable to participate in subjective.     OBJECTIVE:  Vital Signs Last 24 Hrs  T(C): 36.6 (07 Mar 2025 12:00), Max: 37 (06 Mar 2025 16:00)  T(F): 97.8 (07 Mar 2025 12:00), Max: 98.6 (06 Mar 2025 16:00)  HR: 100 (07 Mar 2025 14:42) (77 - 106)  BP: 161/61 (07 Mar 2025 12:00) (142/59 - 163/62)  BP(mean): --  RR: 18 (07 Mar 2025 12:00) (15 - 18)  SpO2: 100% (07 Mar 2025 14:42) (98% - 100%)    Parameters below as of 07 Mar 2025 12:00  Patient On (Oxygen Delivery Method): ventilator    O2 Concentration (%): 40    Physical Examination:  GEN: NAD, resting quietly  VASCULAR: palpable radial pulse, aneurysmal AVF with palpable thrill, contracted, R hand warm, unable to cooperate with motor exam. Dressing intact with minimal strikethrough. No bleeding noted on needle holes.

## 2025-03-07 NOTE — CHART NOTE - NSCHARTNOTEFT_GEN_A_CORE
Received text via TEAMS from Dr. Hassan regarding results of RUE venous duplex - "Wall thickening noted in prox outflow vein; age-indet non occ dvt again noted in stented brachial vein .  Dr Mejia murcia vascular notified and she will discuss findings with her attending

## 2025-03-08 LAB
BASOPHILS # BLD AUTO: 0.07 K/UL — SIGNIFICANT CHANGE UP (ref 0–0.2)
BASOPHILS NFR BLD AUTO: 0.4 % — SIGNIFICANT CHANGE UP (ref 0–2)
CALCIUM SERPL-MCNC: 9.6 MG/DL — SIGNIFICANT CHANGE UP (ref 8.4–10.5)
CHLORIDE SERPL-SCNC: 91 MMOL/L — LOW (ref 98–107)
CO2 SERPL-SCNC: 27 MMOL/L — SIGNIFICANT CHANGE UP (ref 22–31)
CREAT SERPL-MCNC: 2.28 MG/DL — HIGH (ref 0.5–1.3)
CULTURE RESULTS: SIGNIFICANT CHANGE UP
CULTURE RESULTS: SIGNIFICANT CHANGE UP
EGFR: 30 ML/MIN/1.73M2 — LOW
EGFR: 30 ML/MIN/1.73M2 — LOW
EOSINOPHIL # BLD AUTO: 0.25 K/UL — SIGNIFICANT CHANGE UP (ref 0–0.5)
EOSINOPHIL NFR BLD AUTO: 1.4 % — SIGNIFICANT CHANGE UP (ref 0–6)
GLUCOSE BLDC GLUCOMTR-MCNC: 151 MG/DL — HIGH (ref 70–99)
GLUCOSE BLDC GLUCOMTR-MCNC: 161 MG/DL — HIGH (ref 70–99)
GLUCOSE BLDC GLUCOMTR-MCNC: 166 MG/DL — HIGH (ref 70–99)
GLUCOSE BLDC GLUCOMTR-MCNC: 86 MG/DL — SIGNIFICANT CHANGE UP (ref 70–99)
GLUCOSE SERPL-MCNC: 130 MG/DL — HIGH (ref 70–99)
HCT VFR BLD CALC: 29.3 % — LOW (ref 39–50)
HGB BLD-MCNC: 9 G/DL — LOW (ref 13–17)
IANC: 15.26 K/UL — HIGH (ref 1.8–7.4)
IMM GRANULOCYTES NFR BLD AUTO: 0.9 % — SIGNIFICANT CHANGE UP (ref 0–0.9)
LYMPHOCYTES # BLD AUTO: 1.4 K/UL — SIGNIFICANT CHANGE UP (ref 1–3.3)
LYMPHOCYTES # BLD AUTO: 7.7 % — LOW (ref 13–44)
MAGNESIUM SERPL-MCNC: 2.6 MG/DL — SIGNIFICANT CHANGE UP (ref 1.6–2.6)
MCHC RBC-ENTMCNC: 23.9 PG — LOW (ref 27–34)
MCHC RBC-ENTMCNC: 30.7 G/DL — LOW (ref 32–36)
MCV RBC AUTO: 77.9 FL — LOW (ref 80–100)
MONOCYTES # BLD AUTO: 1.01 K/UL — HIGH (ref 0–0.9)
MONOCYTES NFR BLD AUTO: 5.6 % — SIGNIFICANT CHANGE UP (ref 2–14)
NEUTROPHILS # BLD AUTO: 15.26 K/UL — HIGH (ref 1.8–7.4)
NEUTROPHILS NFR BLD AUTO: 84 % — HIGH (ref 43–77)
NRBC # BLD AUTO: 0.15 K/UL — HIGH (ref 0–0)
NRBC # FLD: 0.15 K/UL — HIGH (ref 0–0)
NRBC BLD AUTO-RTO: 0 /100 WBCS — SIGNIFICANT CHANGE UP (ref 0–0)
PHOSPHATE SERPL-MCNC: 2.3 MG/DL — LOW (ref 2.5–4.5)
PLATELET # BLD AUTO: 367 K/UL — SIGNIFICANT CHANGE UP (ref 150–400)
POTASSIUM SERPL-MCNC: 3.8 MMOL/L — SIGNIFICANT CHANGE UP (ref 3.5–5.3)
POTASSIUM SERPL-SCNC: 3.8 MMOL/L — SIGNIFICANT CHANGE UP (ref 3.5–5.3)
RBC # BLD: 3.76 M/UL — LOW (ref 4.2–5.8)
RBC # FLD: 18.4 % — HIGH (ref 10.3–14.5)
SPECIMEN SOURCE: SIGNIFICANT CHANGE UP
SPECIMEN SOURCE: SIGNIFICANT CHANGE UP
WBC # BLD: 18.15 K/UL — HIGH (ref 3.8–10.5)
WBC # FLD AUTO: 18.15 K/UL — HIGH (ref 3.8–10.5)

## 2025-03-08 RX ORDER — SOD PHOS DI, MONO/K PHOS MONO 250 MG
1 TABLET ORAL ONCE
Refills: 0 | Status: COMPLETED | OUTPATIENT
Start: 2025-03-08 | End: 2025-03-08

## 2025-03-08 RX ORDER — ACETAMINOPHEN 500 MG/5ML
1000 LIQUID (ML) ORAL ONCE
Refills: 0 | Status: COMPLETED | OUTPATIENT
Start: 2025-03-08 | End: 2025-03-08

## 2025-03-08 RX ADMIN — INSULIN LISPRO 1: 100 INJECTION, SOLUTION INTRAVENOUS; SUBCUTANEOUS at 11:25

## 2025-03-08 RX ADMIN — Medication 1 TABLET(S): at 11:33

## 2025-03-08 RX ADMIN — INSULIN LISPRO 5 UNIT(S): 100 INJECTION, SOLUTION INTRAVENOUS; SUBCUTANEOUS at 23:45

## 2025-03-08 RX ADMIN — Medication 25 GRAM(S): at 17:01

## 2025-03-08 RX ADMIN — INSULIN GLARGINE-YFGN 13 UNIT(S): 100 INJECTION, SOLUTION SUBCUTANEOUS at 23:44

## 2025-03-08 RX ADMIN — INSULIN LISPRO 1: 100 INJECTION, SOLUTION INTRAVENOUS; SUBCUTANEOUS at 05:12

## 2025-03-08 RX ADMIN — Medication 2.5 MILLIGRAM(S): at 20:06

## 2025-03-08 RX ADMIN — Medication 50 MILLIGRAM(S): at 13:08

## 2025-03-08 RX ADMIN — Medication 2.5 MILLIGRAM(S): at 04:32

## 2025-03-08 RX ADMIN — Medication 2.5 MILLIGRAM(S): at 15:28

## 2025-03-08 RX ADMIN — Medication 400 MILLIGRAM(S): at 20:40

## 2025-03-08 RX ADMIN — Medication 40 MILLIGRAM(S): at 05:15

## 2025-03-08 RX ADMIN — Medication 15 MILLILITER(S): at 05:14

## 2025-03-08 RX ADMIN — Medication 2.5 MILLIGRAM(S): at 08:39

## 2025-03-08 RX ADMIN — Medication 25 GRAM(S): at 05:12

## 2025-03-08 RX ADMIN — AMLODIPINE BESYLATE 10 MILLIGRAM(S): 10 TABLET ORAL at 05:15

## 2025-03-08 RX ADMIN — Medication 15 MILLILITER(S): at 17:03

## 2025-03-08 RX ADMIN — Medication 3 MILLILITER(S): at 15:45

## 2025-03-08 RX ADMIN — Medication 1 APPLICATION(S): at 11:30

## 2025-03-08 RX ADMIN — Medication 50 MILLIGRAM(S): at 05:15

## 2025-03-08 RX ADMIN — INSULIN LISPRO 5 UNIT(S): 100 INJECTION, SOLUTION INTRAVENOUS; SUBCUTANEOUS at 05:13

## 2025-03-08 RX ADMIN — Medication 300 MILLIGRAM(S): at 11:24

## 2025-03-08 RX ADMIN — Medication 50 MILLIGRAM(S): at 21:03

## 2025-03-08 RX ADMIN — ATORVASTATIN CALCIUM 20 MILLIGRAM(S): 80 TABLET, FILM COATED ORAL at 21:02

## 2025-03-08 RX ADMIN — INSULIN LISPRO 1: 100 INJECTION, SOLUTION INTRAVENOUS; SUBCUTANEOUS at 23:45

## 2025-03-08 RX ADMIN — HEPARIN SODIUM 5000 UNIT(S): 1000 INJECTION INTRAVENOUS; SUBCUTANEOUS at 05:42

## 2025-03-08 RX ADMIN — Medication 1 PACKET(S): at 11:34

## 2025-03-08 RX ADMIN — INSULIN LISPRO 5 UNIT(S): 100 INJECTION, SOLUTION INTRAVENOUS; SUBCUTANEOUS at 11:25

## 2025-03-08 RX ADMIN — Medication 1000 MILLIGRAM(S): at 22:00

## 2025-03-08 RX ADMIN — WHITE PETROLATUM 1 APPLICATION(S): 1 OINTMENT TOPICAL at 17:02

## 2025-03-08 RX ADMIN — WHITE PETROLATUM 1 APPLICATION(S): 1 OINTMENT TOPICAL at 05:13

## 2025-03-08 RX ADMIN — Medication 3 MILLILITER(S): at 09:03

## 2025-03-08 NOTE — PROGRESS NOTE ADULT - NS ATTEND AMEND GEN_ALL_CORE FT
Mr. Cleveland74 yo man PMHx HTN, IDDM2, CVA x2, RLE DVT, chronic respiratory failure s/p trach 10/2024, ESRD on HD MWF who presents for RUE fistulogram and angioplasty with vascular, course complicated by sepsis 2/2 PNA/UTI and hyperglycemia, transferred to RCU for further management.     Plan:    # PsA VAP and UTI- On zosyn for a total of 7 days, to complete 3/10.   # Chronic trach/PEG- continue AC//15/6/40%. PS trials once acute pneumonia resolves.   # RUE DVT- non occlusive DVT in R brachial vein- S/p Vascular evaluation- hold off on AC at this time d/t high risk of bleeding with HD.   #Bleeding RUE AVF- Appreciate vascular recommendations- considering fistulogram.   # ESRD on HD- per nephrology.   # Hyperglycemia, BG management per endocrine.     Full Code  Observing off DVT ppx given bleeding RUE AVF   Tube feeds

## 2025-03-08 NOTE — PROGRESS NOTE ADULT - SUBJECTIVE AND OBJECTIVE BOX
[facility]   MD ELIANE BHAGAT MD ANGELA WONG, PA QIAN CHEN, NP    TEL:  OFFICE: [phone]  From 5pm-7am Answering Service [phone]    -- RENAL FOLLOW UP NOTE ---Date of Service 03-08-25 @ 17:04    Patient is a 74y old  Male who presents with a chief complaint of elevated WBC.    Patient seen and examined at bedside.  Trached to vent; no respiratory distress.    VITALS:  T(F): 98.2 (03-08-25 @ 16:00), Max: 98.6 (03-08-25 @ 08:00)  HR: 93 (03-08-25 @ 16:00)  BP: 154/56 (03-08-25 @ 16:00)  RR: 15 (03-08-25 @ 16:00)  SpO2: 100% (03-08-25 @ 16:00)  Wt(kg): --    03-07 @ 07:01  -  03-08 @ 07:00  --------------------------------------------------------  IN: 1180 mL / OUT: 2600 mL / NET: -1420 mL      PHYSICAL EXAM:  General: NAD  Neck: No JVD  Respiratory: +b/l rhonchi  Cardiovascular: S1, S2, RRR  Gastrointestinal: BS+, soft, NT/ND  Extremities: + b/l UE edema    Hospital Medications:   MEDICATIONS  (STANDING):  albuterol    0.083% 2.5 milliGRAM(s) Nebulizer every 6 hours  amLODIPine   Tablet 10 milliGRAM(s) Oral daily  AQUAPHOR (petrolatum Ointment) 1 Application(s) Topical two times a day  atorvastatin 20 milliGRAM(s) Oral at bedtime  chlorhexidine 0.12% Liquid 15 milliLiter(s) Oral Mucosa every 12 hours  chlorhexidine 2% Cloths 1 Application(s) Topical daily  dextrose 5%. 1000 milliLiter(s) (100 mL/Hr) IV Continuous <Continuous>  dextrose 5%. 1000 milliLiter(s) (50 mL/Hr) IV Continuous <Continuous>  dextrose 50% Injectable 25 Gram(s) IV Push once  dextrose 50% Injectable 12.5 Gram(s) IV Push once  dextrose 50% Injectable 25 Gram(s) IV Push once  epoetin reilly-epbx (RETACRIT) Injectable 85549 Unit(s) IV Push <User Schedule>  ferrous    sulfate Liquid 300 milliGRAM(s) Enteral Tube daily  glucagon  Injectable 1 milliGRAM(s) IntraMuscular once  hydrALAZINE 50 milliGRAM(s) Oral three times a day  insulin glargine Injectable (LANTUS) 13 Unit(s) SubCutaneous at bedtime  insulin lispro (ADMELOG) corrective regimen sliding scale   SubCutaneous every 6 hours  insulin lispro Injectable (ADMELOG) 5 Unit(s) SubCutaneous every 6 hours  Nephro-noam 1 Tablet(s) Oral daily  pantoprazole   Suspension 40 milliGRAM(s) Oral before breakfast  piperacillin/tazobactam IVPB.. 4.5 Gram(s) IV Intermittent every 12 hours  sodium chloride 0.9% for Nebulization 3 milliLiter(s) Nebulizer every 6 hours      LABS:  03-08    135  |  91[L]  |  36[H]  ----------------------------<  130[H]  3.8   |  27  |  2.28[H]    Ca    9.6      08 Mar 2025 05:30  Phos  2.3     03-08  Mg     2.60     03-08      Creatinine Trend: 2.28 <--, 3.24 <--, 2.47 <--, 3.47 <--, 2.80 <--, 2.24 <--, 3.49 <--, 2.98 <--, 2.74 <--    Phosphorus: 2.3 mg/dL (03-08 @ 05:30)                          9.0    18.15 )-----------( 367      ( 08 Mar 2025 05:30 )             29.3     Urine Studies:  Urinalysis - [03-08-25 @ 05:30]      Color  / Appearance  / SG  / pH       Gluc 130 / Ketone   / Bili  / Urobili        Blood  / Protein  / Leuk Est  / Nitrite       RBC  / WBC  / Hyaline  / Gran  / Sq Epi  / Non Sq Epi  / Bacteria       Iron 46, TIBC 142, %sat 32      [02-19-25 @ 11:39]  Ferritin 3601      [02-19-25 @ 11:39]  PTH -- (Ca --)      [05-26-24 @ 01:20]   122  TSH 1.660      [10-05-24 @ 08:37]  Lipid: chol --, , HDL --, LDL --      [10-18-24 @ 06:00]    HBsAb 654.8      [02-19-25 @ 19:05]  HBsAg Nonreact      [02-19-25 @ 19:05]  HBcAb Nonreact      [02-19-25 @ 19:05]  HCV 0.19, Nonreact      [02-19-25 @ 19:05]

## 2025-03-08 NOTE — PROGRESS NOTE ADULT - ASSESSMENT
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 Patent recently admitted in 6/2024 for left pontine and cerebellar CVA requiring tracheostomy. While at [facility] patient decannulated and passed SS with advanced diet to easy to chew with thin liquids, but complicated COVID requiring transfer to [facility] in 7/2024 and then ultimately discharged home. Per wife patient was doing well at home until 10/2024 when he was found with RLL with concern for aspiration requiring intubation, then tracheostomy, and ultimately discharged to NH with vent dependence in 11/2024. Patient now presented for RUE fistulogram and angioplasty with vascular, however course complicated by leukocytosis with concern for recurrent trachitis vs PNA and UTI, AOCD and hyperglycemia. Admitted to RCU for further management. Found with  PSA trachitis/ PNA and enterococcus faecium UTI. Course complicated by RUE brachial DVT and hypoxia with HD with concern for volume down vs PE?      NEUROLOGY  # Poor mentation second to metabolic encephalopathy vs psychosomatic/ depression   - Hx of L cerebellar and pontene embolic CVA x 2 with residual R hemiplegia   - AOx0, bedbound, and nonverbal at baseline per report, however per exam patient able to open eyes, able to follow commands on left (LUE>LLE) with SEVERE weakness, however noted with R hemiplegia per baseline  - Nods yes and no occasionally however keeps eyes closed likely psychosomatic vs metabolic encephalopathy with infections?   - Last CTH in 10/2024 with no acute findings   - Hold [facility] CT for now   - Monitor mentation     CARDIOVASCULAR  # Hx of HTN and HLD  - Continue on hydral 50 (increased 3/2)   - Continue on norvasc 10   - Monitor BP     # Incidental Pericardial effusion   - Incidental small pericardial effusion seen adjacent to right ventricle, however IVC appears flat and LE without edema with low concern for RV failure.   - Prior TTE reviewed from 4/2024 with normal RVLVSF with no pericardial effusion noted at the time.   - TTE 2/23 with EF 65, normal LVRVSF, mild LAD, normal RA, mild pulmonary hypertension, and small pericardial effusion noted adjacent to the anterior right ventricle with no echocardiographic evidence of tamponade  - Monitor hemodynamics     RESPIRATORY  # Respiratory failure second to PNA vs volume down vs PE?   - Hx of COPD with chronic respiratory failure with tracheostomy and vent dependence  - Admitted for angioplasty of RUE AVF, however course complicated by leukocytosis with concern for recurrent trachitis/ PNA.   - Course complicated by hypoxia during HD likely volume down vs migration of RUE brachial DVT with PE?   - Held volume removal, bolus given, and hypoxia improved.   - CTA CHEST without PE  - Course complicated by LLL mucous plug and IPV started with improvement   - Continue on albuterol, NS 0.9 and IPV  - Continue on vent 15/400/6/40  - Hold PS for now    HEENT   # Hemoptysis   - Wife reported hemoptysis while at nursing home 2 weeks prior to admission   - No hemoptysis noted on admission   - Hemoptysis returned in house   - CTA CHEST 2/24 without PE  - Bronch 2/26 with no evidence of bleeding   - Bleeding resolved     GI  # Dysphagia with PEG   - Passed SS with advanced diet to easy to chew with thin liquids in 7/2024, however since recurrent aspiration in 10/2024 now with PEG/ vent dependence   - Continue on PEG TF and changed to bolus feeds   - Monitor tolerance     RENAL  # ESRD on HD   # AVF trouble  # Dehydration   - Presented for RUE fistulogram and angioplasty with vascular on 2/18   - Resumed on HD with no flow issue from AVF   - Course complicated by hypoxia with concern for volume down given small IVC and elevated lactate on 2/21  - Volume removal held, lactate improved, and hypoxia improved.   - Monitor volume status and continue on HD MWF in house per renal   - Return to HD MTRF outpatient   - Monitor renal function, electrolytes and UOP     # AVF oozing   - Oozing from AVF post HD   - Discussed with vascular and no need for AC given DVT as below is chronic   - Surgicell placed and HSQ stopped   - Bleeding improved   - Monitor for now    # Hyponatremia likely volume down   - Concern for volume issues, however overall appears volume down with serum osmo 307  - Sodium 128 and improved with HD/ bolus during HD.   - Trend sodium with HD for now     # Elevated lactate   - Lactate elevated likely second to volume down?   - Post bolus lactate trending down from 3.3 to 2.7 to 1.3    INFECTIOUS DISEASE  # Recurrent LLL PNA   - CXR with LLL mucous plug   - POCUS with LLL consolidation vs atelectasis   - Recent BAL with PSA as below   - FULL RVP negative  - SCx with PSA  - Fevers returned and zosyn (3/3 -3/10) started empirically     # Hemoptysis   - Hemoptysis as above   - BAL with PSA as prior   - No fever and monitor off ABX     # Trachitis vs PNA/ UTI   - Hx of steno and PSA in sputum   - Flu/ COVID negative   - MRSA negative   - UCx with enterococcus   - SCx with  PSA   - Found with leukocytosis and started on cefepime and christiano (2/19) and vanco on HD days (2/21).   - No further steno seen in sputum and continue on cefepime (2/19 - 2/25) and vanco on HD days (2/21, 2/22 - 2/25)   - WBC increased likely reactive to hypoxic event and now improving  - ID following     # PPX   - MRSA PCR negative  - Candida auris PCR POSITIVE  - Continue on isolation precautions per IC    HEME  # AOCD   - Presented for angioplasty and found with anemia to 6.7 s/p 1U PRBC 2/19 and 1/2 PRBC 2/23   - Anemia panel with concern for AOCD   - Continue on EPO QIW and Fe   - Monitor HH     # Heparin resistance?   - PTT persistently low despite increasing heparin GTT   - Resume on heparin GTT and anti Xa level supra therapeutic on but PTT remained low  - Patient ultimately with concern for heparin resistance    VASCULAR  # RUE DVT   - RUE edema noted with age-indeterminate, non-occlusive deep vein thrombosis noted in the right brachial vein.   - Case discussed with vascular who recommends full AC   - CTH from prior with encephalomalcia, however no hx of intracranial bleed   - Prior UGIB while on AC, however discharged on eliquis in 7/2024 and completed 6 month course for RLE DVT   - Continue on heparin GTT, however held for hemoptysis and resistance   - Continued on argatroban with good tolerance and then changed to eliquis   - Case discussed vascular and DVT likely chronic and no need for eliquis     ENDOCRINE  # DM2 A1C 14.0 (1/2024) > 6.4 (2/2025)  - Presented with hyperglycemia and continued on lantus and ISS   - Increased lantus, however FS continues to trend in 200s and changed to NPH with improvement.  - TF interrupted for bronchoscopy and adjustment to bolus feeds and FS dropped.   - NPH stopped and FS improved, however trended back up with continuation of tube feeds.   - NPH resumed, however FS continues to waxe and wane and case discussed with endocrine   - Continue on lantus 13 and lispro 5 with ISS for now   - Monitor FS and adjust as needed  - Endocrine following     ETHICS/ GOC    - FULL CODE   - Wife wishes for palliative discussion   - Pending palliative discussion    DISPO - Back to NH when able

## 2025-03-08 NOTE — PROGRESS NOTE ADULT - SUBJECTIVE AND OBJECTIVE BOX
TEAM [ C ] Surgery Daily Progress Note  =====================================================  SUBJECTIVE: Patient seen and examined at bedside on AM rounds. Subjective limited. Pt with prolonged post HD bleeding yesterday. Pt with minimal oozing from avf cannulation site. stopped after further pressure.     PMH:   PAST MEDICAL & SURGICAL HISTORY:  ESRD on hemodialysis    HTN (hypertension)    Insulin dependent type 2 diabetes mellitus    History of cerebrovascular accident (CVA) with residual deficit    History of DVT of lower extremity    HLD (hyperlipidemia)    CVA (cerebrovascular accident)    Aphasia    Tracheostomy in place    PEG (percutaneous endoscopic gastrostomy) status    GERD (gastroesophageal reflux disease)    Constipation    Pressure ulcer    Arteriovenous fistula    S/P percutaneous endoscopic gastrostomy (PEG) tube placement    History of tracheostomy    ALLERGIES:  No Known Allergies  --------------------------------------------------------------------------------------  MEDICATIONS:    Neurologic Medications    Respiratory Medications  albuterol    0.083% 2.5 milliGRAM(s) Nebulizer every 6 hours  sodium chloride 0.9% for Nebulization 3 milliLiter(s) Nebulizer every 6 hours    Cardiovascular Medications  amLODIPine   Tablet 10 milliGRAM(s) Oral daily  hydrALAZINE 50 milliGRAM(s) Oral three times a day    Gastrointestinal Medications  dextrose 5%. 1000 milliLiter(s) IV Continuous <Continuous>  dextrose 5%. 1000 milliLiter(s) IV Continuous <Continuous>  ferrous    sulfate Liquid 300 milliGRAM(s) Enteral Tube daily  Nephro-noam 1 Tablet(s) Oral daily  pantoprazole   Suspension 40 milliGRAM(s) Oral before breakfast  sodium chloride 0.9% Bolus. 100 milliLiter(s) IV Bolus every 5 minutes PRN SBP LESS THAN or EQUAL to 80 mmHg    Genitourinary Medications    Hematologic/Oncologic Medications  epoetin reilly-epbx (RETACRIT) Injectable 89091 Unit(s) IV Push <User Schedule>  heparin   Injectable 5000 Unit(s) SubCutaneous every 8 hours    Antimicrobial/Immunologic Medications  piperacillin/tazobactam IVPB.. 4.5 Gram(s) IV Intermittent every 12 hours    Endocrine/Metabolic Medications  atorvastatin 20 milliGRAM(s) Oral at bedtime  dextrose 50% Injectable 25 Gram(s) IV Push once  dextrose 50% Injectable 12.5 Gram(s) IV Push once  dextrose 50% Injectable 25 Gram(s) IV Push once  dextrose Oral Gel 15 Gram(s) Oral once PRN Blood Glucose LESS THAN 70 milliGRAM(s)/deciliter  glucagon  Injectable 1 milliGRAM(s) IntraMuscular once  insulin glargine Injectable (LANTUS) 13 Unit(s) SubCutaneous at bedtime  insulin lispro (ADMELOG) corrective regimen sliding scale   SubCutaneous every 6 hours  insulin lispro Injectable (ADMELOG) 5 Unit(s) SubCutaneous every 6 hours    Topical/Other Medications  AQUAPHOR (petrolatum Ointment) 1 Application(s) Topical two times a day  chlorhexidine 0.12% Liquid 15 milliLiter(s) Oral Mucosa every 12 hours  chlorhexidine 2% Cloths 1 Application(s) Topical daily    --------------------------------------------------------------------------------------    VITAL SIGNS:    T(C): 37 (03-08-25 @ 08:00), Max: 37 (03-08-25 @ 08:00)  HR: 85 (03-08-25 @ 11:25) (85 - 108)  BP: 150/57 (03-08-25 @ 08:00) (149/63 - 181/63)  RR: 17 (03-08-25 @ 08:00) (16 - 19)  SpO2: 100% (03-08-25 @ 11:25) (98% - 100%)    --------------------------------------------------------------------------------------  Physical Examination:  GEN: NAD, resting quietly  VASCULAR: palpable radial pulse, aneurysmal AVF with palpable thrill, contracted, R hand warm, unable to cooperate with motor exam. Dressing intact with minimal strikethrough. No bleeding noted on needle holes.  --------------------------------------------------------------------------------------  LABS                        9.0    18.15 )-----------( 367      ( 08 Mar 2025 05:30 )             29.3   03-08    135  |  91[L]  |  36[H]  ----------------------------<  130[H]  3.8   |  27  |  2.28[H]    Ca    9.6      08 Mar 2025 05:30  Phos  2.3     03-08  Mg     2.60     03-08  --------------------------------------------------------------------------------------  INS AND OUTS:    03-07-25 @ 07:01  -  03-08-25 @ 07:00  --------------------------------------------------------  IN: 1180 mL / OUT: 2600 mL / NET: -1420 mL    --------------------------------------------------------------------------------------

## 2025-03-08 NOTE — PROGRESS NOTE ADULT - ASSESSMENT
74-year-old male hx trach/vent - , CVA, COPD, stage renal disease on dialysis 4 times a week (MTRF) history of pressure ulcer.  Patient presents the ED today for elevated white count. nephrology consulted for dialysis needs    ESRD:  from [facility]  On HD MTTsF via an A-V fistula. s/p fistulogram by vascular 2/18  Continue MWF in hospital  S/p HD on 3/5 and 3/7.  Prolonged bleeding post HD -- vascular consulted.  Duplex demonstrates outflow thrombosis ---> possible fistulogram.  consent from wife in dialysis unit.  monitor bmp    Hypertension  BP fluctuating; acceptable.  continue norvasc 10 daily and hydralazine increased to 50 tid 3/2  titrate hydralazine to 100mg TID if needed  max uf as tolerated  monitor BP    Anemia:  Transfuse for Hg < 7.  s/p 1 unit PRBC 2/23  epo with hd  iron sat >20  Improving.  monitor Hgb    leukocytosis  sepsis work up per team  On Zosyn.    hyponatremia  in setting of esrd and hyperglycemia  optimize dm control  hd per schedule with UF  monitor Na    Hypokalemia  Dialyze w/ 3K bath as needed.  Monitor K.    Hypermagnesemia  HD per schedule.  Monitor Mg.    CKD - MBD:  Check PTH.  PO4 low - not on binder.  Received PhosNaK x1 on 3/8.  Monitor PO4 and Ca daily.

## 2025-03-08 NOTE — PROVIDER CONTACT NOTE (OTHER) - BACKGROUND
Christian Polanco,   Pt admitted for UTI.

## 2025-03-08 NOTE — PROGRESS NOTE ADULT - NS ATTEND AMEND GEN_ALL_CORE FT
Mr. Art 75 yo man PMHx COPD, CVAx2 with residual R hemiplegia, ESRD on HD TIW via RUE AVF, HTN, HLD, DM, RLE DVT s/p eliquis course, UGIB, who was admitted 6/2024 with left pontine/cerebellar CVA requiring tracheostomy, initially decannulated until 10/2024 when he was admitted for aspiration pneumonia and again required a trach after prolonged vent dependence.  This admission, he presents for RUE fistulogram and angioplasty with vascular, course complicated by sepsis 2/2 PNA/UTI and hyperglycemia, transferred to RCU for further management.     Plan:    # PsA VAP and UTI- On zosyn for a total of 7 days, to complete 3/10.   # Chronic trach/PEG- continue AC//15/6/40%. PS trials once acute pneumonia resolves.   # RUE DVT- non occlusive DVT in R brachial vein- S/p Vascular evaluation- hold off on AC at this time d/t high risk of bleeding with HD.   # Bleeding RUE AVF- Appreciate vascular recommendations- considering fistulogram.   # ESRD on HD- per nephrology.   # Hyperglycemia, BG management per endocrine.     Full Code  Observing off DVT ppx given bleeding RUE AVF   Tube feeds.

## 2025-03-08 NOTE — PROGRESS NOTE ADULT - SUBJECTIVE AND OBJECTIVE BOX
RCU PROGRESS NOTE     CHIEF COMPLAINT: Patient is a 74y old  Male who presents with a chief complaint of 2/18/25 was in cath lab earlier today with vascular surgery for fistulogram and noted to have a WBC of 18 and sent to ED and admitted (04 Mar 2025 12:22)      INTERVAL EVENTS:    REVIEW OF SYSTEMS: Seen by bedside during AM rounds and     Mode: AC/ CMV (Assist Control/ Continuous Mandatory Ventilation), RR (machine): 15, TV (machine): 400, FiO2: 40, PEEP: 6, ITime: 0.7, MAP: 11, PIP: 29      OBJECTIVE:  ICU Vital Signs Last 24 Hrs  T(C): 36.9 (08 Mar 2025 05:18), Max: 36.9 (08 Mar 2025 05:18)  T(F): 98.4 (08 Mar 2025 05:18), Max: 98.4 (08 Mar 2025 05:18)  HR: 89 (08 Mar 2025 07:07) (87 - 108)  BP: 168/52 (08 Mar 2025 06:35) (147/71 - 181/63)  BP(mean): --  ABP: --  ABP(mean): --  RR: 19 (08 Mar 2025 05:18) (15 - 19)  SpO2: 100% (08 Mar 2025 07:07) (98% - 100%)    O2 Parameters below as of 08 Mar 2025 05:18  Patient On (Oxygen Delivery Method): ventilator    O2 Concentration (%): 40      Mode: AC/ CMV (Assist Control/ Continuous Mandatory Ventilation), RR (machine): 15, TV (machine): 400, FiO2: 40, PEEP: 6, ITime: 0.7, MAP: 11, PIP: 29    03-07 @ 07:01  -  03-08 @ 07:00  --------------------------------------------------------  IN: 1180 mL / OUT: 2600 mL / NET: -1420 mL      CAPILLARY BLOOD GLUCOSE      POCT Blood Glucose.: 151 mg/dL (08 Mar 2025 05:02)      HOSPITAL MEDICATIONS:  MEDICATIONS  (STANDING):  albuterol    0.083% 2.5 milliGRAM(s) Nebulizer every 6 hours  amLODIPine   Tablet 10 milliGRAM(s) Oral daily  AQUAPHOR (petrolatum Ointment) 1 Application(s) Topical two times a day  atorvastatin 20 milliGRAM(s) Oral at bedtime  chlorhexidine 0.12% Liquid 15 milliLiter(s) Oral Mucosa every 12 hours  chlorhexidine 2% Cloths 1 Application(s) Topical daily  dextrose 5%. 1000 milliLiter(s) (100 mL/Hr) IV Continuous <Continuous>  dextrose 5%. 1000 milliLiter(s) (50 mL/Hr) IV Continuous <Continuous>  dextrose 50% Injectable 25 Gram(s) IV Push once  dextrose 50% Injectable 12.5 Gram(s) IV Push once  dextrose 50% Injectable 25 Gram(s) IV Push once  epoetin reilly-epbx (RETACRIT) Injectable 10919 Unit(s) IV Push <User Schedule>  ferrous    sulfate Liquid 300 milliGRAM(s) Enteral Tube daily  glucagon  Injectable 1 milliGRAM(s) IntraMuscular once  heparin   Injectable 5000 Unit(s) SubCutaneous every 8 hours  hydrALAZINE 50 milliGRAM(s) Oral three times a day  insulin glargine Injectable (LANTUS) 13 Unit(s) SubCutaneous at bedtime  insulin lispro (ADMELOG) corrective regimen sliding scale   SubCutaneous every 6 hours  insulin lispro Injectable (ADMELOG) 5 Unit(s) SubCutaneous every 6 hours  Nephro-noam 1 Tablet(s) Oral daily  pantoprazole   Suspension 40 milliGRAM(s) Oral before breakfast  piperacillin/tazobactam IVPB.. 4.5 Gram(s) IV Intermittent every 12 hours  sodium chloride 0.9% for Nebulization 3 milliLiter(s) Nebulizer every 6 hours    MEDICATIONS  (PRN):  dextrose Oral Gel 15 Gram(s) Oral once PRN Blood Glucose LESS THAN 70 milliGRAM(s)/deciliter  sodium chloride 0.9% Bolus. 100 milliLiter(s) IV Bolus every 5 minutes PRN SBP LESS THAN or EQUAL to 80 mmHg      PHYSICAL EXAMINATION    LABS:                        9.0    18.15 )-----------( 367      ( 08 Mar 2025 05:30 )             29.3     03-08    135  |  91[L]  |  36[H]  ----------------------------<  130[H]  3.8   |  27  |  2.28[H]    Ca    9.6      08 Mar 2025 05:30  Phos  2.3     03-08  Mg     2.60     03-08        Urinalysis Basic - ( 08 Mar 2025 05:30 )    Color: x / Appearance: x / SG: x / pH: x  Gluc: 130 mg/dL / Ketone: x  / Bili: x / Urobili: x   Blood: x / Protein: x / Nitrite: x   Leuk Esterase: x / RBC: x / WBC x   Sq Epi: x / Non Sq Epi: x / Bacteria: x            PAST MEDICAL & SURGICAL HISTORY:  ESRD on hemodialysis      HTN (hypertension)      Insulin dependent type 2 diabetes mellitus      History of cerebrovascular accident (CVA) with residual deficit      History of DVT of lower extremity      HLD (hyperlipidemia)      CVA (cerebrovascular accident)      Aphasia      Tracheostomy in place      PEG (percutaneous endoscopic gastrostomy) status      GERD (gastroesophageal reflux disease)      Constipation      Pressure ulcer      Arteriovenous fistula      S/P percutaneous endoscopic gastrostomy (PEG) tube placement      History of tracheostomy          FAMILY HISTORY:  FHx: diabetes mellitus (Father, Aunt)        Social History:      RADIOLOGY:  [ ] Reviewed and interpreted by me    PULMONARY FUNCTION TESTS:    EKG: RCU PROGRESS NOTE     CHIEF COMPLAINT: Patient is a 74y old  Male who presents with a chief complaint of 2/18/25 was in cath lab earlier today with vascular surgery for fistulogram and noted to have a WBC of 18 and sent to ED and admitted (04 Mar 2025 12:22)      INTERVAL EVENTS:  - AVF bleed post HD and surgicell placed and HSQ stopped    REVIEW OF SYSTEMS: Seen by bedside during AM rounds and unable to assess ROS as vented     Mode: AC/ CMV (Assist Control/ Continuous Mandatory Ventilation), RR (machine): 15, TV (machine): 400, FiO2: 40, PEEP: 6, ITime: 0.7, MAP: 11, PIP: 29      OBJECTIVE:  ICU Vital Signs Last 24 Hrs  T(C): 36.9 (08 Mar 2025 05:18), Max: 36.9 (08 Mar 2025 05:18)  T(F): 98.4 (08 Mar 2025 05:18), Max: 98.4 (08 Mar 2025 05:18)  HR: 89 (08 Mar 2025 07:07) (87 - 108)  BP: 168/52 (08 Mar 2025 06:35) (147/71 - 181/63)  BP(mean): --  ABP: --  ABP(mean): --  RR: 19 (08 Mar 2025 05:18) (15 - 19)  SpO2: 100% (08 Mar 2025 07:07) (98% - 100%)    O2 Parameters below as of 08 Mar 2025 05:18  Patient On (Oxygen Delivery Method): ventilator    O2 Concentration (%): 40      Mode: AC/ CMV (Assist Control/ Continuous Mandatory Ventilation), RR (machine): 15, TV (machine): 400, FiO2: 40, PEEP: 6, ITime: 0.7, MAP: 11, PIP: 29    03-07 @ 07:01  -  03-08 @ 07:00  --------------------------------------------------------  IN: 1180 mL / OUT: 2600 mL / NET: -1420 mL      CAPILLARY BLOOD GLUCOSE  POCT Blood Glucose.: 151 mg/dL (08 Mar 2025 05:02)      HOSPITAL MEDICATIONS:  MEDICATIONS  (STANDING):  albuterol    0.083% 2.5 milliGRAM(s) Nebulizer every 6 hours  amLODIPine   Tablet 10 milliGRAM(s) Oral daily  AQUAPHOR (petrolatum Ointment) 1 Application(s) Topical two times a day  atorvastatin 20 milliGRAM(s) Oral at bedtime  chlorhexidine 0.12% Liquid 15 milliLiter(s) Oral Mucosa every 12 hours  chlorhexidine 2% Cloths 1 Application(s) Topical daily  dextrose 5%. 1000 milliLiter(s) (100 mL/Hr) IV Continuous <Continuous>  dextrose 5%. 1000 milliLiter(s) (50 mL/Hr) IV Continuous <Continuous>  dextrose 50% Injectable 25 Gram(s) IV Push once  dextrose 50% Injectable 12.5 Gram(s) IV Push once  dextrose 50% Injectable 25 Gram(s) IV Push once  epoetin reilly-epbx (RETACRIT) Injectable 57944 Unit(s) IV Push <User Schedule>  ferrous    sulfate Liquid 300 milliGRAM(s) Enteral Tube daily  glucagon  Injectable 1 milliGRAM(s) IntraMuscular once  heparin   Injectable 5000 Unit(s) SubCutaneous every 8 hours  hydrALAZINE 50 milliGRAM(s) Oral three times a day  insulin glargine Injectable (LANTUS) 13 Unit(s) SubCutaneous at bedtime  insulin lispro (ADMELOG) corrective regimen sliding scale   SubCutaneous every 6 hours  insulin lispro Injectable (ADMELOG) 5 Unit(s) SubCutaneous every 6 hours  Nephro-noam 1 Tablet(s) Oral daily  pantoprazole   Suspension 40 milliGRAM(s) Oral before breakfast  piperacillin/tazobactam IVPB.. 4.5 Gram(s) IV Intermittent every 12 hours  sodium chloride 0.9% for Nebulization 3 milliLiter(s) Nebulizer every 6 hours    MEDICATIONS  (PRN):  dextrose Oral Gel 15 Gram(s) Oral once PRN Blood Glucose LESS THAN 70 milliGRAM(s)/deciliter  sodium chloride 0.9% Bolus. 100 milliLiter(s) IV Bolus every 5 minutes PRN SBP LESS THAN or EQUAL to 80 mmHg      PHYSICAL EXAMINATION  General: NAD   HEENT: Trach present  Cards: S1/S2, no murmurs   Pulm: Course vent sounds bilaterally. No wheezes.   Abdomen: Soft, nondistended and nontender. PEG present  Extremities: No pedal edema. No active THAI of BL upper and lower extremities second to poor cooperation.  Neurology: Awakens but refuses to interact with no acute focal neurological deficits     LABS:                        9.0    18.15 )-----------( 367      ( 08 Mar 2025 05:30 )             29.3     03-08    135  |  91[L]  |  36[H]  ----------------------------<  130[H]  3.8   |  27  |  2.28[H]    Ca    9.6      08 Mar 2025 05:30  Phos  2.3     03-08  Mg     2.60     03-08        Urinalysis Basic - ( 08 Mar 2025 05:30 )  Color: x / Appearance: x / SG: x / pH: x  Gluc: 130 mg/dL / Ketone: x  / Bili: x / Urobili: x   Blood: x / Protein: x / Nitrite: x   Leuk Esterase: x / RBC: x / WBC x   Sq Epi: x / Non Sq Epi: x / Bacteria: x            PAST MEDICAL & SURGICAL HISTORY:  ESRD on hemodialysis      HTN (hypertension)      Insulin dependent type 2 diabetes mellitus      History of cerebrovascular accident (CVA) with residual deficit      History of DVT of lower extremity      HLD (hyperlipidemia)      CVA (cerebrovascular accident)      Aphasia      Tracheostomy in place      PEG (percutaneous endoscopic gastrostomy) status      GERD (gastroesophageal reflux disease)      Constipation      Pressure ulcer      Arteriovenous fistula      S/P percutaneous endoscopic gastrostomy (PEG) tube placement      History of tracheostomy          FAMILY HISTORY:  FHx: diabetes mellitus (Father, Aunt)        Social History:      RADIOLOGY:  [ ] Reviewed and interpreted by me    PULMONARY FUNCTION TESTS:    EKG:

## 2025-03-08 NOTE — PROGRESS NOTE ADULT - ASSESSMENT
74M PMHx HTN, IDDM2, CVA x2 ( with R. sided weakness and dysphagia s/p PEG tube), bedbound with multiple pressure ulcers, hx of RLE DVT (s/p apixaban course), recent admission for respiratory failure s/p tracheostomy (10/23/24), ESRD on HD ([facility]) now s/p RUE fistulogram with angioplasty (02/18/25, [facility]) found to have profound hyperglycemia, anemia, and leukocytosis admitted to RCU. Had duplex showing DVT in R brachial vein, currently on Eliquis. Reconsulted for prolonged bleeding after HD. Palpable thrill noted on AVF and no bleeding on physical exam.    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

## 2025-03-09 LAB
ANION GAP SERPL CALC-SCNC: 16 MMOL/L — HIGH (ref 7–14)
BASOPHILS # BLD AUTO: 0.06 K/UL — SIGNIFICANT CHANGE UP (ref 0–0.2)
BASOPHILS NFR BLD AUTO: 0.3 % — SIGNIFICANT CHANGE UP (ref 0–2)
BLD GP AB SCN SERPL QL: NEGATIVE — SIGNIFICANT CHANGE UP
BUN SERPL-MCNC: 51 MG/DL — HIGH (ref 7–23)
CALCIUM SERPL-MCNC: 9.5 MG/DL — SIGNIFICANT CHANGE UP (ref 8.4–10.5)
CHLORIDE SERPL-SCNC: 90 MMOL/L — LOW (ref 98–107)
CO2 SERPL-SCNC: 27 MMOL/L — SIGNIFICANT CHANGE UP (ref 22–31)
CREAT SERPL-MCNC: 3.05 MG/DL — HIGH (ref 0.5–1.3)
EGFR: 21 ML/MIN/1.73M2 — LOW
EGFR: 21 ML/MIN/1.73M2 — LOW
EOSINOPHIL # BLD AUTO: 0.14 K/UL — SIGNIFICANT CHANGE UP (ref 0–0.5)
EOSINOPHIL NFR BLD AUTO: 0.7 % — SIGNIFICANT CHANGE UP (ref 0–6)
GLUCOSE BLDC GLUCOMTR-MCNC: 139 MG/DL — HIGH (ref 70–99)
GLUCOSE BLDC GLUCOMTR-MCNC: 175 MG/DL — HIGH (ref 70–99)
GLUCOSE BLDC GLUCOMTR-MCNC: 198 MG/DL — HIGH (ref 70–99)
GLUCOSE SERPL-MCNC: 119 MG/DL — HIGH (ref 70–99)
HCT VFR BLD CALC: 25.3 % — LOW (ref 39–50)
HGB BLD-MCNC: 8 G/DL — LOW (ref 13–17)
IANC: 18.25 K/UL — HIGH (ref 1.8–7.4)
IMM GRANULOCYTES NFR BLD AUTO: 0.7 % — SIGNIFICANT CHANGE UP (ref 0–0.9)
LYMPHOCYTES # BLD AUTO: 1.35 K/UL — SIGNIFICANT CHANGE UP (ref 1–3.3)
LYMPHOCYTES # BLD AUTO: 6.4 % — LOW (ref 13–44)
MAGNESIUM SERPL-MCNC: 2.8 MG/DL — HIGH (ref 1.6–2.6)
MCHC RBC-ENTMCNC: 23.8 PG — LOW (ref 27–34)
MCHC RBC-ENTMCNC: 31.6 G/DL — LOW (ref 32–36)
MCV RBC AUTO: 75.3 FL — LOW (ref 80–100)
MONOCYTES # BLD AUTO: 1.07 K/UL — HIGH (ref 0–0.9)
MONOCYTES NFR BLD AUTO: 5.1 % — SIGNIFICANT CHANGE UP (ref 2–14)
NEUTROPHILS # BLD AUTO: 18.25 K/UL — HIGH (ref 1.8–7.4)
NEUTROPHILS NFR BLD AUTO: 86.8 % — HIGH (ref 43–77)
NRBC # BLD AUTO: 0.1 K/UL — HIGH (ref 0–0)
NRBC # FLD: 0.1 K/UL — HIGH (ref 0–0)
NRBC BLD AUTO-RTO: 0 /100 WBCS — SIGNIFICANT CHANGE UP (ref 0–0)
PHOSPHATE SERPL-MCNC: 4 MG/DL — SIGNIFICANT CHANGE UP (ref 2.5–4.5)
PLATELET # BLD AUTO: 302 K/UL — SIGNIFICANT CHANGE UP (ref 150–400)
POTASSIUM SERPL-MCNC: 3.4 MMOL/L — LOW (ref 3.5–5.3)
POTASSIUM SERPL-SCNC: 3.4 MMOL/L — LOW (ref 3.5–5.3)
PTH-INTACT FLD-MCNC: 25 PG/ML — SIGNIFICANT CHANGE UP (ref 15–65)
RBC # BLD: 3.36 M/UL — LOW (ref 4.2–5.8)
RBC # FLD: 18 % — HIGH (ref 10.3–14.5)
RH IG SCN BLD-IMP: POSITIVE — SIGNIFICANT CHANGE UP
SODIUM SERPL-SCNC: 133 MMOL/L — LOW (ref 135–145)
WBC # BLD: 21.01 K/UL — HIGH (ref 3.8–10.5)
WBC # FLD AUTO: 21.01 K/UL — HIGH (ref 3.8–10.5)

## 2025-03-09 PROCEDURE — 71045 X-RAY EXAM CHEST 1 VIEW: CPT | Mod: 26

## 2025-03-09 RX ADMIN — Medication 50 MILLIGRAM(S): at 14:31

## 2025-03-09 RX ADMIN — INSULIN LISPRO 1: 100 INJECTION, SOLUTION INTRAVENOUS; SUBCUTANEOUS at 11:58

## 2025-03-09 RX ADMIN — Medication 25 GRAM(S): at 05:26

## 2025-03-09 RX ADMIN — Medication 300 MILLIGRAM(S): at 11:55

## 2025-03-09 RX ADMIN — INSULIN LISPRO 1: 100 INJECTION, SOLUTION INTRAVENOUS; SUBCUTANEOUS at 17:33

## 2025-03-09 RX ADMIN — Medication 2.5 MILLIGRAM(S): at 20:12

## 2025-03-09 RX ADMIN — Medication 50 MILLIGRAM(S): at 21:58

## 2025-03-09 RX ADMIN — Medication 2.5 MILLIGRAM(S): at 03:42

## 2025-03-09 RX ADMIN — INSULIN LISPRO 5 UNIT(S): 100 INJECTION, SOLUTION INTRAVENOUS; SUBCUTANEOUS at 05:25

## 2025-03-09 RX ADMIN — INSULIN LISPRO 5 UNIT(S): 100 INJECTION, SOLUTION INTRAVENOUS; SUBCUTANEOUS at 17:33

## 2025-03-09 RX ADMIN — WHITE PETROLATUM 1 APPLICATION(S): 1 OINTMENT TOPICAL at 17:48

## 2025-03-09 RX ADMIN — Medication 50 MILLIGRAM(S): at 05:25

## 2025-03-09 RX ADMIN — Medication 40 MILLIGRAM(S): at 05:26

## 2025-03-09 RX ADMIN — INSULIN LISPRO 5 UNIT(S): 100 INJECTION, SOLUTION INTRAVENOUS; SUBCUTANEOUS at 11:58

## 2025-03-09 RX ADMIN — Medication 2.5 MILLIGRAM(S): at 15:24

## 2025-03-09 RX ADMIN — Medication 15 MILLILITER(S): at 05:25

## 2025-03-09 RX ADMIN — Medication 15 MILLILITER(S): at 17:48

## 2025-03-09 RX ADMIN — Medication 1 TABLET(S): at 11:55

## 2025-03-09 RX ADMIN — AMLODIPINE BESYLATE 10 MILLIGRAM(S): 10 TABLET ORAL at 05:25

## 2025-03-09 RX ADMIN — Medication 1 APPLICATION(S): at 11:56

## 2025-03-09 RX ADMIN — Medication 3 MILLILITER(S): at 15:38

## 2025-03-09 RX ADMIN — WHITE PETROLATUM 1 APPLICATION(S): 1 OINTMENT TOPICAL at 05:25

## 2025-03-09 RX ADMIN — Medication 2.5 MILLIGRAM(S): at 09:57

## 2025-03-09 RX ADMIN — Medication 3 MILLILITER(S): at 09:57

## 2025-03-09 RX ADMIN — ATORVASTATIN CALCIUM 20 MILLIGRAM(S): 80 TABLET, FILM COATED ORAL at 21:55

## 2025-03-09 RX ADMIN — Medication 25 GRAM(S): at 18:25

## 2025-03-09 NOTE — PROGRESS NOTE ADULT - NS ATTEND AMEND GEN_ALL_CORE FT
Mr. Art 75 yo man PMHx COPD, CVAx2 with residual R hemiplegia, ESRD on HD TIW via RUE AVF, HTN, HLD, DM, RLE DVT s/p eliquis course, UGIB, who was admitted 6/2024 with left pontine/cerebellar CVA requiring tracheostomy, initially decannulated until 10/2024 when he was admitted for aspiration pneumonia and again required a trach after prolonged vent dependence.  This admission, he presents for RUE fistulogram and angioplasty with vascular, course complicated by sepsis 2/2 PNA/UTI and hyperglycemia, transferred to RCU for further management.     Plan:    # PsA VAP and UTI- On zosyn for a total of 7 days, to complete 3/10.   # Chronic trach/PEG- continue AC//15/6/40%. PS trials once acute pneumonia resolves.   # RUE DVT- non occlusive DVT in R brachial vein- S/p Vascular evaluation- hold off on AC at this time d/t high risk of bleeding with HD.   # Bleeding RUE AVF- Appreciate vascular recommendations- considering fistulogram.   # ESRD on HD- per nephrology.   # Hyperglycemia, BG management per endocrine.     Full Code  Observing off DVT ppx given bleeding RUE AVF   Tube feeds. Mr. Art 73 yo man PMHx COPD, CVAx2 with residual R hemiplegia, ESRD on HD TIW via RUE AVF, HTN, HLD, DM, RLE DVT s/p eliquis course, UGIB, who was admitted 6/2024 with left pontine/cerebellar CVA requiring tracheostomy, initially decannulated until 10/2024 when he was admitted for aspiration pneumonia and again required a trach after prolonged vent dependence.  This admission, he presents for RUE fistulogram and angioplasty with vascular, course complicated by sepsis 2/2 PNA/UTI and hyperglycemia, transferred to RCU for further management.     Plan:    # PsA VAP and UTI- On zosyn for a total of 7 days, to complete 3/10. With low grade fever 3/8 (100.3) without further spikes, uptrending WBC count. Follow up blood cultures.   # Chronic trach/PEG- continue AC//15/6/40%. PS trials once acute pneumonia resolves.   # RUE DVT- non occlusive DVT in R brachial vein- S/p Vascular evaluation- hold off on AC at this time d/t high risk of bleeding with HD.   # Bleeding RUE AVF- Appreciate vascular recommendations- s/p surgicell placement with control. Considering fistulogram.   # ESRD on HD- per nephrology.   # Hyperglycemia, BG management per endocrine.     Full Code  Observing off DVT ppx given bleeding RUE AVF   Tube feeds.

## 2025-03-09 NOTE — PROGRESS NOTE ADULT - ASSESSMENT
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 Patent recently admitted in 6/2024 for left pontine and cerebellar CVA requiring tracheostomy. While at [facility] patient decannulated and passed SS with advanced diet to easy to chew with thin liquids, but complicated COVID requiring transfer to [facility] in 7/2024 and then ultimately discharged home. Per wife patient was doing well at home until 10/2024 when he was found with RLL with concern for aspiration requiring intubation, then tracheostomy, and ultimately discharged to NH with vent dependence in 11/2024. Patient now presented for RUE fistulogram and angioplasty with vascular, however course complicated by leukocytosis with concern for recurrent trachitis vs PNA and UTI, AOCD and hyperglycemia. Admitted to RCU for further management. Found with  PSA trachitis/ PNA and enterococcus faecium UTI. Course complicated by RUE brachial DVT and hypoxia with HD with concern for volume down vs PE?      NEUROLOGY  # Poor mentation second to metabolic encephalopathy vs psychosomatic/ depression   - Hx of L cerebellar and pontene embolic CVA x 2 with residual R hemiplegia   - AOx0, bedbound, and nonverbal at baseline per report, however per exam patient able to open eyes, able to follow commands on left (LUE>LLE) with SEVERE weakness, however noted with R hemiplegia per baseline  - Nods yes and no occasionally however keeps eyes closed likely psychosomatic vs metabolic encephalopathy with infections?   - Last CTH in 10/2024 with no acute findings   - Hold [facility] CT for now   - Monitor mentation     CARDIOVASCULAR  # Hx of HTN and HLD  - Continue on hydral 50 (increased 3/2)   - Continue on norvasc 10   - Monitor BP     # Incidental Pericardial effusion   - Incidental small pericardial effusion seen adjacent to right ventricle, however IVC appears flat and LE without edema with low concern for RV failure.   - Prior TTE reviewed from 4/2024 with normal RVLVSF with no pericardial effusion noted at the time.   - TTE 2/23 with EF 65, normal LVRVSF, mild LAD, normal RA, mild pulmonary hypertension, and small pericardial effusion noted adjacent to the anterior right ventricle with no echocardiographic evidence of tamponade  - Monitor hemodynamics     RESPIRATORY  # Respiratory failure second to PNA vs volume down vs PE?   - Hx of COPD with chronic respiratory failure with tracheostomy and vent dependence  - Admitted for angioplasty of RUE AVF, however course complicated by leukocytosis with concern for recurrent trachitis/ PNA.   - Course complicated by hypoxia during HD likely volume down vs migration of RUE brachial DVT with PE?   - Held volume removal, bolus given, and hypoxia improved.   - CTA CHEST without PE  - Course complicated by LLL mucous plug and IPV started with improvement   - Continue on albuterol, NS 0.9 and IPV  - Continue on vent 15/400/6/40  - Hold PS for now    HEENT   # Hemoptysis   - Wife reported hemoptysis while at nursing home 2 weeks prior to admission   - No hemoptysis noted on admission   - Hemoptysis returned in house   - CTA CHEST 2/24 without PE  - Bronch 2/26 with no evidence of bleeding   - Bleeding resolved     GI  # Dysphagia with PEG   - Passed SS with advanced diet to easy to chew with thin liquids in 7/2024, however since recurrent aspiration in 10/2024 now with PEG/ vent dependence   - Continue on PEG TF and changed to bolus feeds   - Monitor tolerance     RENAL  # ESRD on HD   # Dehydration   - Resumed on HD with no flow issue from AVF   - Course complicated by hypoxia with concern for volume down given small IVC and elevated lactate on 2/21  - Volume removal held, lactate improved, and hypoxia improved.   - Continue on HD MWF in house per renal   - Return to HD MTRF outpatient   - Monitor renal function, electrolytes and UOP     # AVF trouble   # AVF oozing   - Presented for RUE fistulogram and angioplasty with vascular on 2/18   - Oozing from AVF post HD, surgicell placed, and AC held with improvement.  - Discussed with vascular and no need for AC given DVT as below is chronic   - RPT DOPPLER with again noted DVT in right stented brachial vein, however als noted with significantly elevated flow velocities with turbulence at the anastomotic site. Wall thickening noted at the proximal segment of the outflow vein, which is otherwise patent. Distal segment of the outflow vein appears patent without thrombosis evidence.   - May benefit from fistulogram   - Vascular following    # Hyponatremia likely volume down   - Concern for volume issues, however overall appears volume down with serum osmo 307  - Sodium 128 and improved with HD/ bolus during HD.   - Trend sodium with HD for now     # Elevated lactate   - Lactate elevated likely second to volume down?   - Post bolus lactate trending down from 3.3 to 2.7 to 1.3    INFECTIOUS DISEASE  # Recurrent LLL PNA   - CXR with LLL mucous plug   - POCUS with LLL consolidation vs atelectasis   - Recent BAL with PSA as below   - FULL RVP negative  - SCx with PSA  - Fevers returned and zosyn (3/3 -3/10) started empirically   - WBC rising with high grade temp and pending RPT CXR and BCx    # Hemoptysis   - Hemoptysis as above   - BAL with PSA as prior   - No fever and monitor off ABX     # Trachitis vs PNA/ UTI   - Hx of steno and PSA in sputum   - Flu/ COVID negative   - MRSA negative   - UCx with enterococcus   - SCx with  PSA   - Found with leukocytosis and started on cefepime and christiano (2/19) and vanco on HD days (2/21).   - No further steno seen in sputum and continue on cefepime (2/19 - 2/25) and vanco on HD days (2/21, 2/22 - 2/25)   - WBC increased likely reactive to hypoxic event and now improving  - ID following     # PPX   - MRSA PCR negative  - Candida auris PCR POSITIVE  - Continue on isolation precautions per IC    HEME  # AOCD   - Presented for angioplasty and found with anemia to 6.7 s/p 1U PRBC 2/19 and 1/2 PRBC 2/23   - Anemia panel with concern for AOCD   - Continue on EPO QIW and Fe   - Monitor HH     # Heparin resistance?   - PTT persistently low despite increasing heparin GTT   - Resume on heparin GTT and anti Xa level supra therapeutic on but PTT remained low  - Patient ultimately with concern for heparin resistance    VASCULAR  # RUE DVT   - RUE edema noted with age-indeterminate, non-occlusive deep vein thrombosis noted in the right brachial vein.   - Case discussed with vascular who recommends full AC   - CTH from prior with encephalomalcia, however no hx of intracranial bleed   - Prior UGIB while on AC, however discharged on eliquis in 7/2024 and completed 6 month course for RLE DVT   - Continue on heparin GTT, however held for hemoptysis and resistance   - Continued on argatroban with good tolerance and then changed to eliquis   - Case discussed vascular and DVT likely chronic and no need for eliquis     ENDOCRINE  # DM2 A1C 14.0 (1/2024) > 6.4 (2/2025)  - Presented with hyperglycemia and continued on lantus and ISS   - Increased lantus, however FS continues to trend in 200s and changed to NPH with improvement.  - TF interrupted for bronchoscopy and adjustment to bolus feeds and FS dropped.   - NPH stopped and FS improved, however trended back up with continuation of tube feeds.   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 Patent recently admitted in 6/2024 for left pontine and cerebellar CVA requiring tracheostomy. While at [facility] patient decannulated and passed SS with advanced diet to easy to chew with thin liquids, but complicated COVID requiring transfer to [facility] in 7/2024 and then ultimately discharged home. Per wife patient was doing well at home until 10/2024 when he was found with RLL with concern for aspiration requiring intubation, then tracheostomy, and ultimately discharged to NH with vent dependence in 11/2024. Patient now presented for RUE fistulogram and angioplasty with vascular, however course complicated by leukocytosis with concern for recurrent trachitis vs PNA and UTI, AOCD and hyperglycemia. Admitted to RCU for further management. Found with  PSA trachitis/ PNA and enterococcus faecium UTI. Course complicated by RUE brachial DVT and hypoxia with HD with concern for volume down vs PE?      NEUROLOGY  # Poor mentation second to metabolic encephalopathy vs psychosomatic/ depression   - Hx of L cerebellar and pontene embolic CVA x 2 with residual R hemiplegia   - AOx0, bedbound, and nonverbal at baseline per report, however per exam patient able to open eyes, able to follow commands on left (LUE>LLE) with SEVERE weakness, however noted with R hemiplegia per baseline  - Nods yes and no occasionally however keeps eyes closed likely psychosomatic vs metabolic encephalopathy with infections?   - Last CTH in 10/2024 with no acute findings   - Hold [facility] CT for now   - Monitor mentation     CARDIOVASCULAR  # Hx of HTN and HLD  - Continue on hydral 50 (increased 3/2)   - Continue on norvasc 10   - Monitor BP     # Incidental Pericardial effusion   - Incidental small pericardial effusion seen adjacent to right ventricle, however IVC appears flat and LE without edema with low concern for RV failure.   - Prior TTE reviewed from 4/2024 with normal RVLVSF with no pericardial effusion noted at the time.   - TTE 2/23 with EF 65, normal LVRVSF, mild LAD, normal RA, mild pulmonary hypertension, and small pericardial effusion noted adjacent to the anterior right ventricle with no echocardiographic evidence of tamponade  - Monitor hemodynamics     RESPIRATORY  # Respiratory failure second to PNA vs volume down vs PE?   - Hx of COPD with chronic respiratory failure with tracheostomy and vent dependence  - Admitted for angioplasty of RUE AVF, however course complicated by leukocytosis with concern for recurrent trachitis/ PNA.   - Course complicated by hypoxia during HD likely volume down vs migration of RUE brachial DVT with PE?   - Held volume removal, bolus given, and hypoxia improved.   - CTA CHEST without PE  - Course complicated by LLL mucous plug and IPV started with improvement   - Continue on albuterol, NS 0.9 and IPV  - Continue on vent 15/400/6/40    HEENT   # Hemoptysis   - Wife reported hemoptysis while at nursing home 2 weeks prior to admission   - No hemoptysis noted on admission   - Hemoptysis returned in house   - CTA CHEST 2/24 without PE  - Bronch 2/26 with no evidence of bleeding   - Bleeding resolved     GI  # Dysphagia with PEG   - Passed SS with advanced diet to easy to chew with thin liquids in 7/2024, however since recurrent aspiration in 10/2024 now with PEG/ vent dependence   - Continue on PEG TF and changed to bolus feeds   - Monitor tolerance     RENAL  # ESRD on HD   # Dehydration   - Resumed on HD with no flow issue from AVF   - Course complicated by hypoxia with concern for volume down given small IVC and elevated lactate on 2/21  - Volume removal held, lactate improved, and hypoxia improved.   - Continue on HD MWF in house per renal   - Return to HD MTRF outpatient   - Monitor renal function, electrolytes and UOP     # AVF trouble   # AVF oozing   - Presented for RUE fistulogram and angioplasty with vascular on 2/18   - Oozing from AVF post HD, surgicell placed, and AC held with improvement.  - Discussed with vascular and no need for AC given DVT as below is chronic   - RPT DOPPLER with again noted DVT in right stented brachial vein, however als noted with significantly elevated flow velocities with turbulence at the anastomotic site. Wall thickening noted at the proximal segment of the outflow vein, which is otherwise patent. Distal segment of the outflow vein appears patent without thrombosis evidence.   - May benefit from fistulogram   - Vascular following    # Hyponatremia likely volume down   - Concern for volume issues, however overall appears volume down with serum osmo 307  - Sodium 128 and improved with HD/ bolus during HD.   - Trend sodium with HD for now     # Elevated lactate   - Lactate elevated likely second to volume down?   - Post bolus lactate trending down from 3.3 to 2.7 to 1.3    INFECTIOUS DISEASE  # Recurrent LLL PNA   - CXR with LLL mucous plug   - POCUS with LLL consolidation vs atelectasis   - Recent BAL with PSA as below   - FULL RVP negative  - SCx with PSA  - Fevers returned and zosyn (3/3 -3/10) started empirically   - WBC rising with high grade temp, RPT CXR appears improved, and pending RPT BCx    # Hemoptysis   - Hemoptysis as above   - BAL with PSA as prior   - No fever and monitor off ABX     # Trachitis vs PNA/ UTI   - Hx of steno and PSA in sputum   - Flu/ COVID negative   - MRSA negative   - UCx with enterococcus   - SCx with  PSA   - Found with leukocytosis and started on cefepime and christiano (2/19) and vanco on HD days (2/21).   - No further steno seen in sputum and continue on cefepime (2/19 - 2/25) and vanco on HD days (2/21, 2/22 - 2/25)   - WBC increased likely reactive to hypoxic event and now improving  - ID following     # PPX   - MRSA PCR negative  - Candida auris PCR POSITIVE  - Continue on isolation precautions per IC    HEME  # AOCD   - Presented for angioplasty and found with anemia to 6.7 s/p 1U PRBC 2/19 and 1/2 PRBC 2/23   - Anemia panel with concern for AOCD   - Continue on EPO QIW and Fe   - Monitor HH     # Heparin resistance?   - PTT persistently low despite increasing heparin GTT   - Resume on heparin GTT and anti Xa level supra therapeutic on but PTT remained low  - Patient ultimately with concern for heparin resistance    VASCULAR  # RUE DVT   - RUE edema noted with age-indeterminate, non-occlusive deep vein thrombosis noted in the right brachial vein.   - Case discussed with vascular who recommends full AC   - CTH from prior with encephalomalcia, however no hx of intracranial bleed   - Prior UGIB while on AC, however discharged on eliquis in 7/2024 and completed 6 month course for RLE DVT   - Continue on heparin GTT, however held for hemoptysis and resistance   - Continued on argatroban with good tolerance and then changed to eliquis   - Case discussed vascular and DVT likely chronic and no need for eliquis     ENDOCRINE  # DM2 A1C 14.0 (1/2024) > 6.4 (2/2025)  - Presented with hyperglycemia and continued on lantus and ISS   - Increased lantus, however FS continues to trend in 200s and changed to NPH with improvement.  - TF interrupted for bronchoscopy and adjustment to bolus feeds and FS dropped.   - NPH stopped and FS improved, however trended back up with continuation of tube feeds.   - NPH resumed, however FS continues to waxe and wane and case discussed with endocrine   - Continue on lantus 13 and lispro 5 with ISS for now   - Monitor FS and adjust as needed  - Endocrine following     ETHICS/ GOC    - FULL CODE   - Wife wishes for palliative discussion   - Pending palliative discussion    DISPO - Back to NH when able

## 2025-03-09 NOTE — PROGRESS NOTE ADULT - SUBJECTIVE AND OBJECTIVE BOX
[facility]   MD ELIANE BHAGAT MD ANGELA WONG, PA QIAN CHEN, ALMA    TEL:  OFFICE: [phone]  From 5pm-7am Answering Service [phone]    -- RENAL FOLLOW UP NOTE ---Date of Service 03-09-25 @ 16:49    Patient is a 74y old  Male who presents with a chief complaint of leukocytosis.    Patient seen and examined at bedside. Trached to vent; no respiratory distress.    VITALS:  T(F): 98.3 (03-09-25 @ 14:00), Max: 100.3 (03-08-25 @ 20:32)  HR: 87 (03-09-25 @ 15:24)  BP: 162/54 (03-09-25 @ 14:00)  RR: 16 (03-09-25 @ 14:00)  SpO2: 100% (03-09-25 @ 15:24)  Wt(kg): --    03-08 @ 06:01  -  03-09 @ 07:00  --------------------------------------------------------  IN: 870 mL / OUT: 0 mL / NET: 870 mL      PHYSICAL EXAM:  General: NAD  Neck: No JVD  Respiratory: CTAB, no wheezes, rales or rhonchi  Cardiovascular: S1, S2, RRR  Gastrointestinal: BS+, soft, NT/ND  Extremities: + b/l UE edema.    Hospital Medications:   MEDICATIONS  (STANDING):  albuterol    0.083% 2.5 milliGRAM(s) Nebulizer every 6 hours  amLODIPine   Tablet 10 milliGRAM(s) Oral daily  AQUAPHOR (petrolatum Ointment) 1 Application(s) Topical two times a day  atorvastatin 20 milliGRAM(s) Oral at bedtime  chlorhexidine 0.12% Liquid 15 milliLiter(s) Oral Mucosa every 12 hours  chlorhexidine 2% Cloths 1 Application(s) Topical daily  dextrose 5%. 1000 milliLiter(s) (100 mL/Hr) IV Continuous <Continuous>  dextrose 5%. 1000 milliLiter(s) (50 mL/Hr) IV Continuous <Continuous>  dextrose 50% Injectable 25 Gram(s) IV Push once  dextrose 50% Injectable 12.5 Gram(s) IV Push once  dextrose 50% Injectable 25 Gram(s) IV Push once  epoetin reilly-epbx (RETACRIT) Injectable 25427 Unit(s) IV Push <User Schedule>  ferrous    sulfate Liquid 300 milliGRAM(s) Enteral Tube daily  glucagon  Injectable 1 milliGRAM(s) IntraMuscular once  hydrALAZINE 50 milliGRAM(s) Oral three times a day  insulin glargine Injectable (LANTUS) 13 Unit(s) SubCutaneous at bedtime  insulin lispro (ADMELOG) corrective regimen sliding scale   SubCutaneous every 6 hours  insulin lispro Injectable (ADMELOG) 5 Unit(s) SubCutaneous every 6 hours  Nephro-noam 1 Tablet(s) Oral daily  pantoprazole   Suspension 40 milliGRAM(s) Oral before breakfast  piperacillin/tazobactam IVPB.. 4.5 Gram(s) IV Intermittent every 12 hours  sodium chloride 0.9% for Nebulization 3 milliLiter(s) Nebulizer every 6 hours      LABS:  03-09    133[L]  |  90[L]  |  51[H]  ----------------------------<  119[H]  3.4[L]   |  27  |  3.05[H]    Ca    9.5      09 Mar 2025 05:20  Phos  4.0     03-09  Mg     2.80     03-09      Creatinine Trend: 3.05 <--, 2.28 <--, 3.24 <--, 2.47 <--, 3.47 <--, 2.80 <--, 2.24 <--, 3.49 <--    Phosphorus: 4.0 mg/dL (03-09 @ 05:20)    calcium--  intact pth25  parathyroid hormone intact, serum--                        8.0    21.01 )-----------( 302      ( 09 Mar 2025 05:20 )             25.3     Urine Studies:  Urinalysis - [03-09-25 @ 05:20]      Color  / Appearance  / SG  / pH       Gluc 119 / Ketone   / Bili  / Urobili        Blood  / Protein  / Leuk Est  / Nitrite       RBC  / WBC  / Hyaline  / Gran  / Sq Epi  / Non Sq Epi  / Bacteria       Iron 46, TIBC 142, %sat 32      [02-19-25 @ 11:39]  Ferritin 3601      [02-19-25 @ 11:39]  PTH -- (Ca --)      [03-09-25 @ 05:20]   25  PTH -- (Ca --)      [05-26-24 @ 01:20]   122  TSH 1.660      [10-05-24 @ 08:37]  Lipid: chol --, , HDL --, LDL --      [10-18-24 @ 06:00]    HBsAb 654.8      [02-19-25 @ 19:05]  HBsAg Nonreact      [02-19-25 @ 19:05]  HBcAb Nonreact      [02-19-25 @ 19:05]  HCV 0.19, Nonreact      [02-19-25 @ 19:05]

## 2025-03-09 NOTE — PROGRESS NOTE ADULT - ASSESSMENT
74-year-old male hx trach/vent - , CVA, COPD, stage renal disease on dialysis 4 times a week (MTRF) history of pressure ulcer.  Patient presents the ED today for elevated white count. nephrology consulted for dialysis needs    ESRD:  from [facility]  On HD MTTsF via an A-V fistula. s/p fistulogram by vascular 2/18  Continue MWF in hospital  S/p HD on 3/5 and 3/7.  Prolonged bleeding post HD -- vascular consulted.  Duplex demonstrates outflow thrombosis ---> possible fistulogram.  Plan for HD tomorrow.  consent from wife in dialysis unit.  monitor bmp    Hypertension  BP fluctuating; acceptable.  continue norvasc 10 daily and hydralazine increased to 50 tid 3/2  titrate hydralazine to 100mg TID if needed  max uf as tolerated  monitor BP    Anemia:  s/p 1 unit PRBC 2/23  epo with hd  iron sat >20  monitor Hgb  Transfuse for Hg < 7.    leukocytosis  sepsis work up per team  On Zosyn.    hyponatremia  in setting of esrd and hyperglycemia  optimize dm control  hd per schedule with UF  monitor Na    Hypokalemia  Dialyze w/ 3K bath as needed.  Monitor K.    Hypermagnesemia  HD per schedule.  Monitor Mg.    CKD - MBD:  PTH 25 - low for CKD stage.  Received PhosNaK x1 on 3/8.  PO4 improved.  Monitor PO4 and Ca daily.

## 2025-03-09 NOTE — PROGRESS NOTE ADULT - SUBJECTIVE AND OBJECTIVE BOX
RCU PROGRESS NOTE     CHIEF COMPLAINT: Patient is a 74y old  Male who presents with a chief complaint of 2/18/25 was in cath lab earlier today with vascular surgery for fistulogram and noted to have a WBC of 18 and sent to ED and admitted (04 Mar 2025 12:22)      INTERVAL EVENTS:  - High grade temp of 100.3F overnight and WBC rising this morning     REVIEW OF SYSTEMS: Seen by bedside during AM rounds and unable to assess ROS as vented     Mode: AC/ CMV (Assist Control/ Continuous Mandatory Ventilation), RR (machine): 15, TV (machine): 400, FiO2: 40, PEEP: 6, ITime: 0.7, MAP: 11, PIP: 29      OBJECTIVE:  ICU Vital Signs Last 24 Hrs  T(C): 36.9 (08 Mar 2025 05:18), Max: 36.9 (08 Mar 2025 05:18)  T(F): 98.4 (08 Mar 2025 05:18), Max: 98.4 (08 Mar 2025 05:18)  HR: 89 (08 Mar 2025 07:07) (87 - 108)  BP: 168/52 (08 Mar 2025 06:35) (147/71 - 181/63)  BP(mean): --  ABP: --  ABP(mean): --  RR: 19 (08 Mar 2025 05:18) (15 - 19)  SpO2: 100% (08 Mar 2025 07:07) (98% - 100%)    O2 Parameters below as of 08 Mar 2025 05:18  Patient On (Oxygen Delivery Method): ventilator    O2 Concentration (%): 40      Mode: AC/ CMV (Assist Control/ Continuous Mandatory Ventilation), RR (machine): 15, TV (machine): 400, FiO2: 40, PEEP: 6, ITime: 0.7, MAP: 11, PIP: 29    03-07 @ 07:01  -  03-08 @ 07:00  --------------------------------------------------------  IN: 1180 mL / OUT: 2600 mL / NET: -1420 mL      CAPILLARY BLOOD GLUCOSE  POCT Blood Glucose.: 151 mg/dL (08 Mar 2025 05:02)      HOSPITAL MEDICATIONS:  MEDICATIONS  (STANDING):  albuterol    0.083% 2.5 milliGRAM(s) Nebulizer every 6 hours  amLODIPine   Tablet 10 milliGRAM(s) Oral daily  AQUAPHOR (petrolatum Ointment) 1 Application(s) Topical two times a day  atorvastatin 20 milliGRAM(s) Oral at bedtime  chlorhexidine 0.12% Liquid 15 milliLiter(s) Oral Mucosa every 12 hours  chlorhexidine 2% Cloths 1 Application(s) Topical daily  dextrose 5%. 1000 milliLiter(s) (100 mL/Hr) IV Continuous <Continuous>  dextrose 5%. 1000 milliLiter(s) (50 mL/Hr) IV Continuous <Continuous>  dextrose 50% Injectable 25 Gram(s) IV Push once  dextrose 50% Injectable 12.5 Gram(s) IV Push once  dextrose 50% Injectable 25 Gram(s) IV Push once  epoetin reilly-epbx (RETACRIT) Injectable 26579 Unit(s) IV Push <User Schedule>  ferrous    sulfate Liquid 300 milliGRAM(s) Enteral Tube daily  glucagon  Injectable 1 milliGRAM(s) IntraMuscular once  heparin   Injectable 5000 Unit(s) SubCutaneous every 8 hours  hydrALAZINE 50 milliGRAM(s) Oral three times a day  insulin glargine Injectable (LANTUS) 13 Unit(s) SubCutaneous at bedtime  insulin lispro (ADMELOG) corrective regimen sliding scale   SubCutaneous every 6 hours  insulin lispro Injectable (ADMELOG) 5 Unit(s) SubCutaneous every 6 hours  Nephro-noam 1 Tablet(s) Oral daily  pantoprazole   Suspension 40 milliGRAM(s) Oral before breakfast  piperacillin/tazobactam IVPB.. 4.5 Gram(s) IV Intermittent every 12 hours  sodium chloride 0.9% for Nebulization 3 milliLiter(s) Nebulizer every 6 hours    MEDICATIONS  (PRN):  dextrose Oral Gel 15 Gram(s) Oral once PRN Blood Glucose LESS THAN 70 milliGRAM(s)/deciliter  sodium chloride 0.9% Bolus. 100 milliLiter(s) IV Bolus every 5 minutes PRN SBP LESS THAN or EQUAL to 80 mmHg      PHYSICAL EXAMINATION  General: NAD   HEENT: Trach present  Cards: S1/S2, no murmurs   Pulm: Course vent sounds bilaterally. No wheezes.   Abdomen: Soft, nondistended and nontender. PEG present  Extremities: No pedal edema. No active THAI of BL upper and lower extremities second to poor cooperation.  Neurology: Awakens but refuses to interact with no acute focal neurological deficits     LABS:                        9.0    18.15 )-----------( 367      ( 08 Mar 2025 05:30 )             29.3                             8.0    21.01 )-----------( 302      ( 09 Mar 2025 05:20 )             25.3       03-08    135  |  91[L]  |  36[H]  ----------------------------<  130[H]  3.8   |  27  |  2.28[H]    Ca    9.6      08 Mar 2025 05:30  Phos  2.3     03-08  Mg     2.60     03-08      03-09    133[L]  |  90[L]  |  51[H]  ----------------------------<  119[H]  3.4[L]   |  27  |  3.05[H]    Ca    9.5      09 Mar 2025 05:20  Phos  4.0     03-09  Mg     2.80     03-09      Urinalysis Basic - ( 08 Mar 2025 05:30 )  Color: x / Appearance: x / SG: x / pH: x  Gluc: 130 mg/dL / Ketone: x  / Bili: x / Urobili: x   Blood: x / Protein: x / Nitrite: x   Leuk Esterase: x / RBC: x / WBC x   Sq Epi: x / Non Sq Epi: x / Bacteria: x            PAST MEDICAL & SURGICAL HISTORY:  ESRD on hemodialysis      HTN (hypertension)      Insulin dependent type 2 diabetes mellitus      History of cerebrovascular accident (CVA) with residual deficit      History of DVT of lower extremity      HLD (hyperlipidemia)      CVA (cerebrovascular accident)      Aphasia      Tracheostomy in place      PEG (percutaneous endoscopic gastrostomy) status      GERD (gastroesophageal reflux disease)      Constipation      Pressure ulcer      Arteriovenous fistula      S/P percutaneous endoscopic gastrostomy (PEG) tube placement      History of tracheostomy          FAMILY HISTORY:  FHx: diabetes mellitus (Father, Aunt)        Social History:      RADIOLOGY:  [ ] Reviewed and interpreted by me    PULMONARY FUNCTION TESTS:    EKG: RCU PROGRESS NOTE     CHIEF COMPLAINT: Patient is a 74y old  Male who presents with a chief complaint of 2/18/25 was in cath lab earlier today with vascular surgery for fistulogram and noted to have a WBC of 18 and sent to ED and admitted (04 Mar 2025 12:22)      INTERVAL EVENTS:  - High grade temp of 100.3F overnight and WBC rising this morning  - CXR appears improved from prior  - Pending RPT BCx      REVIEW OF SYSTEMS: Seen by bedside during AM rounds and unable to assess ROS as vented     Mode: AC/ CMV (Assist Control/ Continuous Mandatory Ventilation), RR (machine): 15, TV (machine): 400, FiO2: 40, PEEP: 6, ITime: 0.7, MAP: 11, PIP: 29      OBJECTIVE:  ICU Vital Signs Last 24 Hrs  T(C): 36.9 (08 Mar 2025 05:18), Max: 36.9 (08 Mar 2025 05:18)  T(F): 98.4 (08 Mar 2025 05:18), Max: 98.4 (08 Mar 2025 05:18)  HR: 89 (08 Mar 2025 07:07) (87 - 108)  BP: 168/52 (08 Mar 2025 06:35) (147/71 - 181/63)  BP(mean): --  ABP: --  ABP(mean): --  RR: 19 (08 Mar 2025 05:18) (15 - 19)  SpO2: 100% (08 Mar 2025 07:07) (98% - 100%)    O2 Parameters below as of 08 Mar 2025 05:18  Patient On (Oxygen Delivery Method): ventilator    O2 Concentration (%): 40      Mode: AC/ CMV (Assist Control/ Continuous Mandatory Ventilation), RR (machine): 15, TV (machine): 400, FiO2: 40, PEEP: 6, ITime: 0.7, MAP: 11, PIP: 29    03-07 @ 07:01  -  03-08 @ 07:00  --------------------------------------------------------  IN: 1180 mL / OUT: 2600 mL / NET: -1420 mL      CAPILLARY BLOOD GLUCOSE  POCT Blood Glucose.: 151 mg/dL (08 Mar 2025 05:02)      HOSPITAL MEDICATIONS:  MEDICATIONS  (STANDING):  albuterol    0.083% 2.5 milliGRAM(s) Nebulizer every 6 hours  amLODIPine   Tablet 10 milliGRAM(s) Oral daily  AQUAPHOR (petrolatum Ointment) 1 Application(s) Topical two times a day  atorvastatin 20 milliGRAM(s) Oral at bedtime  chlorhexidine 0.12% Liquid 15 milliLiter(s) Oral Mucosa every 12 hours  chlorhexidine 2% Cloths 1 Application(s) Topical daily  dextrose 5%. 1000 milliLiter(s) (100 mL/Hr) IV Continuous <Continuous>  dextrose 5%. 1000 milliLiter(s) (50 mL/Hr) IV Continuous <Continuous>  dextrose 50% Injectable 25 Gram(s) IV Push once  dextrose 50% Injectable 12.5 Gram(s) IV Push once  dextrose 50% Injectable 25 Gram(s) IV Push once  epoetin reilly-epbx (RETACRIT) Injectable 87409 Unit(s) IV Push <User Schedule>  ferrous    sulfate Liquid 300 milliGRAM(s) Enteral Tube daily  glucagon  Injectable 1 milliGRAM(s) IntraMuscular once  heparin   Injectable 5000 Unit(s) SubCutaneous every 8 hours  hydrALAZINE 50 milliGRAM(s) Oral three times a day  insulin glargine Injectable (LANTUS) 13 Unit(s) SubCutaneous at bedtime  insulin lispro (ADMELOG) corrective regimen sliding scale   SubCutaneous every 6 hours  insulin lispro Injectable (ADMELOG) 5 Unit(s) SubCutaneous every 6 hours  Nephro-noam 1 Tablet(s) Oral daily  pantoprazole   Suspension 40 milliGRAM(s) Oral before breakfast  piperacillin/tazobactam IVPB.. 4.5 Gram(s) IV Intermittent every 12 hours  sodium chloride 0.9% for Nebulization 3 milliLiter(s) Nebulizer every 6 hours    MEDICATIONS  (PRN):  dextrose Oral Gel 15 Gram(s) Oral once PRN Blood Glucose LESS THAN 70 milliGRAM(s)/deciliter  sodium chloride 0.9% Bolus. 100 milliLiter(s) IV Bolus every 5 minutes PRN SBP LESS THAN or EQUAL to 80 mmHg      PHYSICAL EXAMINATION  General: NAD   HEENT: Trach present  Cards: S1/S2, no murmurs   Pulm: Course vent sounds bilaterally. No wheezes.   Abdomen: Soft, nondistended and nontender. PEG present  Extremities: No pedal edema. No active THAI of BL upper and lower extremities second to poor cooperation.  Neurology: Awakens but refuses to interact with no acute focal neurological deficits     LABS:                        9.0    18.15 )-----------( 367      ( 08 Mar 2025 05:30 )             29.3                             8.0    21.01 )-----------( 302      ( 09 Mar 2025 05:20 )             25.3       03-08    135  |  91[L]  |  36[H]  ----------------------------<  130[H]  3.8   |  27  |  2.28[H]    Ca    9.6      08 Mar 2025 05:30  Phos  2.3     03-08  Mg     2.60     03-08      03-09    133[L]  |  90[L]  |  51[H]  ----------------------------<  119[H]  3.4[L]   |  27  |  3.05[H]    Ca    9.5      09 Mar 2025 05:20  Phos  4.0     03-09  Mg     2.80     03-09      Urinalysis Basic - ( 08 Mar 2025 05:30 )  Color: x / Appearance: x / SG: x / pH: x  Gluc: 130 mg/dL / Ketone: x  / Bili: x / Urobili: x   Blood: x / Protein: x / Nitrite: x   Leuk Esterase: x / RBC: x / WBC x   Sq Epi: x / Non Sq Epi: x / Bacteria: x            PAST MEDICAL & SURGICAL HISTORY:  ESRD on hemodialysis      HTN (hypertension)      Insulin dependent type 2 diabetes mellitus      History of cerebrovascular accident (CVA) with residual deficit      History of DVT of lower extremity      HLD (hyperlipidemia)      CVA (cerebrovascular accident)      Aphasia      Tracheostomy in place      PEG (percutaneous endoscopic gastrostomy) status      GERD (gastroesophageal reflux disease)      Constipation      Pressure ulcer      Arteriovenous fistula      S/P percutaneous endoscopic gastrostomy (PEG) tube placement      History of tracheostomy          FAMILY HISTORY:  FHx: diabetes mellitus (Father, Aunt)        Social History:      RADIOLOGY:  [ ] Reviewed and interpreted by me    PULMONARY FUNCTION TESTS:    EKG:

## 2025-03-10 LAB
ANION GAP SERPL CALC-SCNC: 19 MMOL/L — HIGH (ref 7–14)
BASOPHILS NFR BLD AUTO: 0.2 % — SIGNIFICANT CHANGE UP (ref 0–2)
BUN SERPL-MCNC: 70 MG/DL — HIGH (ref 7–23)
CALCIUM SERPL-MCNC: 9.7 MG/DL — SIGNIFICANT CHANGE UP (ref 8.4–10.5)
CO2 SERPL-SCNC: 25 MMOL/L — SIGNIFICANT CHANGE UP (ref 22–31)
CREAT SERPL-MCNC: 3.98 MG/DL — HIGH (ref 0.5–1.3)
EGFR: 15 ML/MIN/1.73M2 — LOW
EGFR: 15 ML/MIN/1.73M2 — LOW
EOSINOPHIL # BLD AUTO: 0.12 K/UL — SIGNIFICANT CHANGE UP (ref 0–0.5)
EOSINOPHIL NFR BLD AUTO: 0.5 % — SIGNIFICANT CHANGE UP (ref 0–6)
GLUCOSE BLDC GLUCOMTR-MCNC: 125 MG/DL — HIGH (ref 70–99)
GLUCOSE BLDC GLUCOMTR-MCNC: 133 MG/DL — HIGH (ref 70–99)
GLUCOSE BLDC GLUCOMTR-MCNC: 162 MG/DL — HIGH (ref 70–99)
GLUCOSE SERPL-MCNC: 73 MG/DL — SIGNIFICANT CHANGE UP (ref 70–99)
HCT VFR BLD CALC: 24.2 % — LOW (ref 39–50)
IANC: 20.86 K/UL — HIGH (ref 1.8–7.4)
IMM GRANULOCYTES NFR BLD AUTO: 0.7 % — SIGNIFICANT CHANGE UP (ref 0–0.9)
LYMPHOCYTES # BLD AUTO: 0.95 K/UL — LOW (ref 1–3.3)
LYMPHOCYTES # BLD AUTO: 4.1 % — LOW (ref 13–44)
MAGNESIUM SERPL-MCNC: 3 MG/DL — HIGH (ref 1.6–2.6)
MCHC RBC-ENTMCNC: 23.5 PG — LOW (ref 27–34)
MCHC RBC-ENTMCNC: 31.4 G/DL — LOW (ref 32–36)
MCV RBC AUTO: 74.9 FL — LOW (ref 80–100)
MONOCYTES # BLD AUTO: 0.88 K/UL — SIGNIFICANT CHANGE UP (ref 0–0.9)
MONOCYTES NFR BLD AUTO: 3.8 % — SIGNIFICANT CHANGE UP (ref 2–14)
NEUTROPHILS # BLD AUTO: 20.86 K/UL — HIGH (ref 1.8–7.4)
NEUTROPHILS NFR BLD AUTO: 90.7 % — HIGH (ref 43–77)
NRBC # BLD AUTO: 0.05 K/UL — HIGH (ref 0–0)
NRBC # FLD: 0.05 K/UL — HIGH (ref 0–0)
NRBC BLD AUTO-RTO: 0 /100 WBCS — SIGNIFICANT CHANGE UP (ref 0–0)
PHOSPHATE SERPL-MCNC: 5 MG/DL — HIGH (ref 2.5–4.5)
PLATELET # BLD AUTO: 296 K/UL — SIGNIFICANT CHANGE UP (ref 150–400)
RBC # BLD: 3.23 M/UL — LOW (ref 4.2–5.8)
RBC # FLD: 17.9 % — HIGH (ref 10.3–14.5)
SODIUM SERPL-SCNC: 132 MMOL/L — LOW (ref 135–145)
WBC # BLD: 23.01 K/UL — HIGH (ref 3.8–10.5)
WBC # FLD AUTO: 23.01 K/UL — HIGH (ref 3.8–10.5)

## 2025-03-10 PROCEDURE — 99232 SBSQ HOSP IP/OBS MODERATE 35: CPT

## 2025-03-10 PROCEDURE — 99233 SBSQ HOSP IP/OBS HIGH 50: CPT

## 2025-03-10 RX ORDER — PIPERACILLIN-TAZO-DEXTROSE,ISO 3.375G/5
4.5 IV SOLUTION, PIGGYBACK PREMIX FROZEN(ML) INTRAVENOUS ONCE
Refills: 0 | Status: COMPLETED | OUTPATIENT
Start: 2025-03-10 | End: 2025-03-10

## 2025-03-10 RX ORDER — HEPARIN SODIUM 1000 [USP'U]/ML
5000 INJECTION INTRAVENOUS; SUBCUTANEOUS EVERY 8 HOURS
Refills: 0 | Status: DISCONTINUED | OUTPATIENT
Start: 2025-03-10 | End: 2025-03-11

## 2025-03-10 RX ADMIN — INSULIN LISPRO 5 UNIT(S): 100 INJECTION, SOLUTION INTRAVENOUS; SUBCUTANEOUS at 17:34

## 2025-03-10 RX ADMIN — HEPARIN SODIUM 5000 UNIT(S): 1000 INJECTION INTRAVENOUS; SUBCUTANEOUS at 13:42

## 2025-03-10 RX ADMIN — Medication 2.5 MILLIGRAM(S): at 02:28

## 2025-03-10 RX ADMIN — Medication 15 MILLILITER(S): at 06:14

## 2025-03-10 RX ADMIN — INSULIN LISPRO 1: 100 INJECTION, SOLUTION INTRAVENOUS; SUBCUTANEOUS at 11:17

## 2025-03-10 RX ADMIN — INSULIN LISPRO 5 UNIT(S): 100 INJECTION, SOLUTION INTRAVENOUS; SUBCUTANEOUS at 06:13

## 2025-03-10 RX ADMIN — EPOETIN ALFA 10000 UNIT(S): 10000 SOLUTION INTRAVENOUS; SUBCUTANEOUS at 17:32

## 2025-03-10 RX ADMIN — INSULIN GLARGINE-YFGN 13 UNIT(S): 100 INJECTION, SOLUTION SUBCUTANEOUS at 00:29

## 2025-03-10 RX ADMIN — Medication 2.5 MILLIGRAM(S): at 19:47

## 2025-03-10 RX ADMIN — Medication 50 MILLIGRAM(S): at 13:41

## 2025-03-10 RX ADMIN — AMLODIPINE BESYLATE 10 MILLIGRAM(S): 10 TABLET ORAL at 06:14

## 2025-03-10 RX ADMIN — Medication 50 MILLIGRAM(S): at 22:56

## 2025-03-10 RX ADMIN — INSULIN LISPRO 5 UNIT(S): 100 INJECTION, SOLUTION INTRAVENOUS; SUBCUTANEOUS at 23:39

## 2025-03-10 RX ADMIN — Medication 1 TABLET(S): at 11:12

## 2025-03-10 RX ADMIN — INSULIN LISPRO 5 UNIT(S): 100 INJECTION, SOLUTION INTRAVENOUS; SUBCUTANEOUS at 11:17

## 2025-03-10 RX ADMIN — Medication 1 APPLICATION(S): at 11:12

## 2025-03-10 RX ADMIN — HEPARIN SODIUM 5000 UNIT(S): 1000 INJECTION INTRAVENOUS; SUBCUTANEOUS at 22:57

## 2025-03-10 RX ADMIN — Medication 3 MILLILITER(S): at 22:09

## 2025-03-10 RX ADMIN — INSULIN LISPRO 5 UNIT(S): 100 INJECTION, SOLUTION INTRAVENOUS; SUBCUTANEOUS at 00:28

## 2025-03-10 RX ADMIN — Medication 50 MILLIGRAM(S): at 06:15

## 2025-03-10 RX ADMIN — WHITE PETROLATUM 1 APPLICATION(S): 1 OINTMENT TOPICAL at 17:35

## 2025-03-10 RX ADMIN — Medication 15 MILLILITER(S): at 17:34

## 2025-03-10 RX ADMIN — Medication 40 MILLIGRAM(S): at 06:18

## 2025-03-10 RX ADMIN — Medication 25 GRAM(S): at 06:15

## 2025-03-10 RX ADMIN — INSULIN LISPRO 1: 100 INJECTION, SOLUTION INTRAVENOUS; SUBCUTANEOUS at 00:29

## 2025-03-10 RX ADMIN — INSULIN GLARGINE-YFGN 13 UNIT(S): 100 INJECTION, SOLUTION SUBCUTANEOUS at 23:44

## 2025-03-10 RX ADMIN — WHITE PETROLATUM 1 APPLICATION(S): 1 OINTMENT TOPICAL at 06:15

## 2025-03-10 RX ADMIN — Medication 2.5 MILLIGRAM(S): at 15:38

## 2025-03-10 RX ADMIN — Medication 300 MILLIGRAM(S): at 11:12

## 2025-03-10 RX ADMIN — ATORVASTATIN CALCIUM 20 MILLIGRAM(S): 80 TABLET, FILM COATED ORAL at 22:56

## 2025-03-10 RX ADMIN — Medication 25 GRAM(S): at 22:57

## 2025-03-10 RX ADMIN — Medication 2.5 MILLIGRAM(S): at 10:06

## 2025-03-10 RX ADMIN — Medication 2.5 MILLIGRAM(S): at 00:51

## 2025-03-10 NOTE — PROGRESS NOTE ADULT - SUBJECTIVE AND OBJECTIVE BOX
RCU PROGRESS NOTE     CHIEF COMPLAINT: Patient is a 74y old  Male who presents with a chief complaint of 2/18/25 was in cath lab earlier today with vascular surgery for fistulogram and noted to have a WBC of 18 and sent to ED and admitted (04 Mar 2025 12:22)      INTERVAL EVENTS:  - No further temperatures overnight     REVIEW OF SYSTEMS: Seen by bedside during AM rounds and unable to assess ROS as vented     Mode: AC/ CMV (Assist Control/ Continuous Mandatory Ventilation), RR (machine): 15, TV (machine): 400, FiO2: 40, PEEP: 6, ITime: 0.7, MAP: 11, PIP: 29      OBJECTIVE:  ICU Vital Signs Last 24 Hrs  T(C): 36.9 (08 Mar 2025 05:18), Max: 36.9 (08 Mar 2025 05:18)  T(F): 98.4 (08 Mar 2025 05:18), Max: 98.4 (08 Mar 2025 05:18)  HR: 89 (08 Mar 2025 07:07) (87 - 108)  BP: 168/52 (08 Mar 2025 06:35) (147/71 - 181/63)  BP(mean): --  ABP: --  ABP(mean): --  RR: 19 (08 Mar 2025 05:18) (15 - 19)  SpO2: 100% (08 Mar 2025 07:07) (98% - 100%)    O2 Parameters below as of 08 Mar 2025 05:18  Patient On (Oxygen Delivery Method): ventilator    O2 Concentration (%): 40      Mode: AC/ CMV (Assist Control/ Continuous Mandatory Ventilation), RR (machine): 15, TV (machine): 400, FiO2: 40, PEEP: 6, ITime: 0.7, MAP: 11, PIP: 29    03-07 @ 07:01  -  03-08 @ 07:00  --------------------------------------------------------  IN: 1180 mL / OUT: 2600 mL / NET: -1420 mL      CAPILLARY BLOOD GLUCOSE  POCT Blood Glucose.: 151 mg/dL (08 Mar 2025 05:02)      HOSPITAL MEDICATIONS:  MEDICATIONS  (STANDING):  albuterol    0.083% 2.5 milliGRAM(s) Nebulizer every 6 hours  amLODIPine   Tablet 10 milliGRAM(s) Oral daily  AQUAPHOR (petrolatum Ointment) 1 Application(s) Topical two times a day  atorvastatin 20 milliGRAM(s) Oral at bedtime  chlorhexidine 0.12% Liquid 15 milliLiter(s) Oral Mucosa every 12 hours  chlorhexidine 2% Cloths 1 Application(s) Topical daily  dextrose 5%. 1000 milliLiter(s) (100 mL/Hr) IV Continuous <Continuous>  dextrose 5%. 1000 milliLiter(s) (50 mL/Hr) IV Continuous <Continuous>  dextrose 50% Injectable 25 Gram(s) IV Push once  dextrose 50% Injectable 12.5 Gram(s) IV Push once  dextrose 50% Injectable 25 Gram(s) IV Push once  epoetin reilly-epbx (RETACRIT) Injectable 25195 Unit(s) IV Push <User Schedule>  ferrous    sulfate Liquid 300 milliGRAM(s) Enteral Tube daily  glucagon  Injectable 1 milliGRAM(s) IntraMuscular once  heparin   Injectable 5000 Unit(s) SubCutaneous every 8 hours  hydrALAZINE 50 milliGRAM(s) Oral three times a day  insulin glargine Injectable (LANTUS) 13 Unit(s) SubCutaneous at bedtime  insulin lispro (ADMELOG) corrective regimen sliding scale   SubCutaneous every 6 hours  insulin lispro Injectable (ADMELOG) 5 Unit(s) SubCutaneous every 6 hours  Nephro-noam 1 Tablet(s) Oral daily  pantoprazole   Suspension 40 milliGRAM(s) Oral before breakfast  piperacillin/tazobactam IVPB.. 4.5 Gram(s) IV Intermittent every 12 hours  sodium chloride 0.9% for Nebulization 3 milliLiter(s) Nebulizer every 6 hours    MEDICATIONS  (PRN):  dextrose Oral Gel 15 Gram(s) Oral once PRN Blood Glucose LESS THAN 70 milliGRAM(s)/deciliter  sodium chloride 0.9% Bolus. 100 milliLiter(s) IV Bolus every 5 minutes PRN SBP LESS THAN or EQUAL to 80 mmHg      PHYSICAL EXAMINATION  General: NAD   HEENT: Trach present  Cards: S1/S2, no murmurs   Pulm: Course vent sounds bilaterally. No wheezes.   Abdomen: Soft, nondistended and nontender. PEG present  Extremities: No pedal edema. No active THAI of BL upper and lower extremities second to poor cooperation.  Neurology: Awakens but refuses to interact with no acute focal neurological deficits     LABS:                        9.0    18.15 )-----------( 367      ( 08 Mar 2025 05:30 )             29.3                             8.0    21.01 )-----------( 302      ( 09 Mar 2025 05:20 )             25.3       03-08    135  |  91[L]  |  36[H]  ----------------------------<  130[H]  3.8   |  27  |  2.28[H]    Ca    9.6      08 Mar 2025 05:30  Phos  2.3     03-08  Mg     2.60     03-08      03-09    133[L]  |  90[L]  |  51[H]  ----------------------------<  119[H]  3.4[L]   |  27  |  3.05[H]    Ca    9.5      09 Mar 2025 05:20  Phos  4.0     03-09  Mg     2.80     03-09      Urinalysis Basic - ( 08 Mar 2025 05:30 )  Color: x / Appearance: x / SG: x / pH: x  Gluc: 130 mg/dL / Ketone: x  / Bili: x / Urobili: x   Blood: x / Protein: x / Nitrite: x   Leuk Esterase: x / RBC: x / WBC x   Sq Epi: x / Non Sq Epi: x / Bacteria: x            PAST MEDICAL & SURGICAL HISTORY:  ESRD on hemodialysis      HTN (hypertension)      Insulin dependent type 2 diabetes mellitus      History of cerebrovascular accident (CVA) with residual deficit      History of DVT of lower extremity      HLD (hyperlipidemia)      CVA (cerebrovascular accident)      Aphasia      Tracheostomy in place      PEG (percutaneous endoscopic gastrostomy) status      GERD (gastroesophageal reflux disease)      Constipation      Pressure ulcer      Arteriovenous fistula      S/P percutaneous endoscopic gastrostomy (PEG) tube placement      History of tracheostomy          FAMILY HISTORY:  FHx: diabetes mellitus (Father, Aunt)        Social History:      RADIOLOGY:  [ ] Reviewed and interpreted by me    PULMONARY FUNCTION TESTS:    EKG: RCU PROGRESS NOTE     CHIEF COMPLAINT: Patient is a 74y old  Male who presents with a chief complaint of 2/18/25 was in cath lab earlier today with vascular surgery for fistulogram and noted to have a WBC of 18 and sent to ED and admitted (04 Mar 2025 12:22)      INTERVAL EVENTS:  - No further temperatures overnight   - WBC trending up, but appears nontoxic .   - Zosyn for PNA done tonight     REVIEW OF SYSTEMS: Seen by bedside during AM rounds and unable to assess ROS as vented     Mode: AC/ CMV (Assist Control/ Continuous Mandatory Ventilation), RR (machine): 15, TV (machine): 400, FiO2: 40, PEEP: 6, ITime: 0.7, MAP: 11, PIP: 29      OBJECTIVE:  ICU Vital Signs Last 24 Hrs  T(C): 36.9 (08 Mar 2025 05:18), Max: 36.9 (08 Mar 2025 05:18)  T(F): 98.4 (08 Mar 2025 05:18), Max: 98.4 (08 Mar 2025 05:18)  HR: 89 (08 Mar 2025 07:07) (87 - 108)  BP: 168/52 (08 Mar 2025 06:35) (147/71 - 181/63)  BP(mean): --  ABP: --  ABP(mean): --  RR: 19 (08 Mar 2025 05:18) (15 - 19)  SpO2: 100% (08 Mar 2025 07:07) (98% - 100%)    O2 Parameters below as of 08 Mar 2025 05:18  Patient On (Oxygen Delivery Method): ventilator    O2 Concentration (%): 40      Mode: AC/ CMV (Assist Control/ Continuous Mandatory Ventilation), RR (machine): 15, TV (machine): 400, FiO2: 40, PEEP: 6, ITime: 0.7, MAP: 11, PIP: 29    03-07 @ 07:01  -  03-08 @ 07:00  --------------------------------------------------------  IN: 1180 mL / OUT: 2600 mL / NET: -1420 mL      CAPILLARY BLOOD GLUCOSE  POCT Blood Glucose.: 151 mg/dL (08 Mar 2025 05:02)      HOSPITAL MEDICATIONS:  MEDICATIONS  (STANDING):  albuterol    0.083% 2.5 milliGRAM(s) Nebulizer every 6 hours  amLODIPine   Tablet 10 milliGRAM(s) Oral daily  AQUAPHOR (petrolatum Ointment) 1 Application(s) Topical two times a day  atorvastatin 20 milliGRAM(s) Oral at bedtime  chlorhexidine 0.12% Liquid 15 milliLiter(s) Oral Mucosa every 12 hours  chlorhexidine 2% Cloths 1 Application(s) Topical daily  dextrose 5%. 1000 milliLiter(s) (100 mL/Hr) IV Continuous <Continuous>  dextrose 5%. 1000 milliLiter(s) (50 mL/Hr) IV Continuous <Continuous>  dextrose 50% Injectable 25 Gram(s) IV Push once  dextrose 50% Injectable 12.5 Gram(s) IV Push once  dextrose 50% Injectable 25 Gram(s) IV Push once  epoetin reilly-epbx (RETACRIT) Injectable 21532 Unit(s) IV Push <User Schedule>  ferrous    sulfate Liquid 300 milliGRAM(s) Enteral Tube daily  glucagon  Injectable 1 milliGRAM(s) IntraMuscular once  heparin   Injectable 5000 Unit(s) SubCutaneous every 8 hours  hydrALAZINE 50 milliGRAM(s) Oral three times a day  insulin glargine Injectable (LANTUS) 13 Unit(s) SubCutaneous at bedtime  insulin lispro (ADMELOG) corrective regimen sliding scale   SubCutaneous every 6 hours  insulin lispro Injectable (ADMELOG) 5 Unit(s) SubCutaneous every 6 hours  Nephro-noam 1 Tablet(s) Oral daily  pantoprazole   Suspension 40 milliGRAM(s) Oral before breakfast  piperacillin/tazobactam IVPB.. 4.5 Gram(s) IV Intermittent every 12 hours  sodium chloride 0.9% for Nebulization 3 milliLiter(s) Nebulizer every 6 hours    MEDICATIONS  (PRN):  dextrose Oral Gel 15 Gram(s) Oral once PRN Blood Glucose LESS THAN 70 milliGRAM(s)/deciliter  sodium chloride 0.9% Bolus. 100 milliLiter(s) IV Bolus every 5 minutes PRN SBP LESS THAN or EQUAL to 80 mmHg      PHYSICAL EXAMINATION  General: NAD   HEENT: Trach present  Cards: S1/S2, no murmurs   Pulm: Course vent sounds bilaterally. No wheezes.   Abdomen: Soft, nondistended and nontender. PEG present  Extremities: No pedal edema. No active THAI of BL upper and lower extremities second to poor cooperation.  Neurology: Awakens but refuses to interact with no acute focal neurological deficits     LABS:                        9.0    18.15 )-----------( 367      ( 08 Mar 2025 05:30 )             29.3                             8.0    21.01 )-----------( 302      ( 09 Mar 2025 05:20 )             25.3       03-08    135  |  91[L]  |  36[H]  ----------------------------<  130[H]  3.8   |  27  |  2.28[H]    Ca    9.6      08 Mar 2025 05:30  Phos  2.3     03-08  Mg     2.60     03-08      03-09    133[L]  |  90[L]  |  51[H]  ----------------------------<  119[H]  3.4[L]   |  27  |  3.05[H]    Ca    9.5      09 Mar 2025 05:20  Phos  4.0     03-09  Mg     2.80     03-09      Urinalysis Basic - ( 08 Mar 2025 05:30 )  Color: x / Appearance: x / SG: x / pH: x  Gluc: 130 mg/dL / Ketone: x  / Bili: x / Urobili: x   Blood: x / Protein: x / Nitrite: x   Leuk Esterase: x / RBC: x / WBC x   Sq Epi: x / Non Sq Epi: x / Bacteria: x            PAST MEDICAL & SURGICAL HISTORY:  ESRD on hemodialysis      HTN (hypertension)      Insulin dependent type 2 diabetes mellitus      History of cerebrovascular accident (CVA) with residual deficit      History of DVT of lower extremity      HLD (hyperlipidemia)      CVA (cerebrovascular accident)      Aphasia      Tracheostomy in place      PEG (percutaneous endoscopic gastrostomy) status      GERD (gastroesophageal reflux disease)      Constipation      Pressure ulcer      Arteriovenous fistula      S/P percutaneous endoscopic gastrostomy (PEG) tube placement      History of tracheostomy          FAMILY HISTORY:  FHx: diabetes mellitus (Father, Aunt)        Social History:      RADIOLOGY:  [ ] Reviewed and interpreted by me    PULMONARY FUNCTION TESTS:    EKG: RCU PROGRESS NOTE     CHIEF COMPLAINT: Patient is a 74y old  Male who presents with a chief complaint of 2/18/25 was in cath lab earlier today with vascular surgery for fistulogram and noted to have a WBC of 18 and sent to ED and admitted (04 Mar 2025 12:22)      INTERVAL EVENTS:  - No further temperatures overnight   - WBC trending up, but appears nontoxic .   - Zosyn for PNA done tonight .      REVIEW OF SYSTEMS: Seen by bedside during AM rounds and unable to assess ROS as vented     Mode: AC/ CMV (Assist Control/ Continuous Mandatory Ventilation), RR (machine): 15, TV (machine): 400, FiO2: 40, PEEP: 6, ITime: 0.7, MAP: 11, PIP: 29      OBJECTIVE:  ICU Vital Signs Last 24 Hrs  T(C): 36.9 (08 Mar 2025 05:18), Max: 36.9 (08 Mar 2025 05:18)  T(F): 98.4 (08 Mar 2025 05:18), Max: 98.4 (08 Mar 2025 05:18)  HR: 89 (08 Mar 2025 07:07) (87 - 108)  BP: 168/52 (08 Mar 2025 06:35) (147/71 - 181/63)  BP(mean): --  ABP: --  ABP(mean): --  RR: 19 (08 Mar 2025 05:18) (15 - 19)  SpO2: 100% (08 Mar 2025 07:07) (98% - 100%)    O2 Parameters below as of 08 Mar 2025 05:18  Patient On (Oxygen Delivery Method): ventilator    O2 Concentration (%): 40      Mode: AC/ CMV (Assist Control/ Continuous Mandatory Ventilation), RR (machine): 15, TV (machine): 400, FiO2: 40, PEEP: 6, ITime: 0.7, MAP: 11, PIP: 29    03-07 @ 07:01  -  03-08 @ 07:00  --------------------------------------------------------  IN: 1180 mL / OUT: 2600 mL / NET: -1420 mL      CAPILLARY BLOOD GLUCOSE  POCT Blood Glucose.: 151 mg/dL (08 Mar 2025 05:02)      HOSPITAL MEDICATIONS:  MEDICATIONS  (STANDING):  albuterol    0.083% 2.5 milliGRAM(s) Nebulizer every 6 hours  amLODIPine   Tablet 10 milliGRAM(s) Oral daily  AQUAPHOR (petrolatum Ointment) 1 Application(s) Topical two times a day  atorvastatin 20 milliGRAM(s) Oral at bedtime  chlorhexidine 0.12% Liquid 15 milliLiter(s) Oral Mucosa every 12 hours  chlorhexidine 2% Cloths 1 Application(s) Topical daily  dextrose 5%. 1000 milliLiter(s) (100 mL/Hr) IV Continuous <Continuous>  dextrose 5%. 1000 milliLiter(s) (50 mL/Hr) IV Continuous <Continuous>  dextrose 50% Injectable 25 Gram(s) IV Push once  dextrose 50% Injectable 12.5 Gram(s) IV Push once  dextrose 50% Injectable 25 Gram(s) IV Push once  epoetin reilly-epbx (RETACRIT) Injectable 92246 Unit(s) IV Push <User Schedule>  ferrous    sulfate Liquid 300 milliGRAM(s) Enteral Tube daily  glucagon  Injectable 1 milliGRAM(s) IntraMuscular once  heparin   Injectable 5000 Unit(s) SubCutaneous every 8 hours  hydrALAZINE 50 milliGRAM(s) Oral three times a day  insulin glargine Injectable (LANTUS) 13 Unit(s) SubCutaneous at bedtime  insulin lispro (ADMELOG) corrective regimen sliding scale   SubCutaneous every 6 hours  insulin lispro Injectable (ADMELOG) 5 Unit(s) SubCutaneous every 6 hours  Nephro-noam 1 Tablet(s) Oral daily  pantoprazole   Suspension 40 milliGRAM(s) Oral before breakfast  piperacillin/tazobactam IVPB.. 4.5 Gram(s) IV Intermittent every 12 hours  sodium chloride 0.9% for Nebulization 3 milliLiter(s) Nebulizer every 6 hours    MEDICATIONS  (PRN):  dextrose Oral Gel 15 Gram(s) Oral once PRN Blood Glucose LESS THAN 70 milliGRAM(s)/deciliter  sodium chloride 0.9% Bolus. 100 milliLiter(s) IV Bolus every 5 minutes PRN SBP LESS THAN or EQUAL to 80 mmHg      PHYSICAL EXAMINATION  General: NAD   HEENT: Trach present  Cards: S1/S2, no murmurs   Pulm: Course vent sounds bilaterally. No wheezes.   Abdomen: Soft, nondistended and nontender. PEG present  Extremities: No pedal edema. No active THAI of BL upper and lower extremities second to poor cooperation.  Neurology: Awakens but refuses to interact with no acute focal neurological deficits     LABS:                        9.0    18.15 )-----------( 367      ( 08 Mar 2025 05:30 )             29.3                             8.0    21.01 )-----------( 302      ( 09 Mar 2025 05:20 )             25.3       03-08    135  |  91[L]  |  36[H]  ----------------------------<  130[H]  3.8   |  27  |  2.28[H]    Ca    9.6      08 Mar 2025 05:30  Phos  2.3     03-08  Mg     2.60     03-08      03-09    133[L]  |  90[L]  |  51[H]  ----------------------------<  119[H]  3.4[L]   |  27  |  3.05[H]    Ca    9.5      09 Mar 2025 05:20  Phos  4.0     03-09  Mg     2.80     03-09      Urinalysis Basic - ( 08 Mar 2025 05:30 )  Color: x / Appearance: x / SG: x / pH: x  Gluc: 130 mg/dL / Ketone: x  / Bili: x / Urobili: x   Blood: x / Protein: x / Nitrite: x   Leuk Esterase: x / RBC: x / WBC x   Sq Epi: x / Non Sq Epi: x / Bacteria: x            PAST MEDICAL & SURGICAL HISTORY:  ESRD on hemodialysis      HTN (hypertension)      Insulin dependent type 2 diabetes mellitus      History of cerebrovascular accident (CVA) with residual deficit      History of DVT of lower extremity      HLD (hyperlipidemia)      CVA (cerebrovascular accident)      Aphasia      Tracheostomy in place      PEG (percutaneous endoscopic gastrostomy) status      GERD (gastroesophageal reflux disease)      Constipation      Pressure ulcer      Arteriovenous fistula      S/P percutaneous endoscopic gastrostomy (PEG) tube placement      History of tracheostomy          FAMILY HISTORY:  FHx: diabetes mellitus (Father, Aunt)        Social History:      RADIOLOGY:  [ ] Reviewed and interpreted by me    PULMONARY FUNCTION TESTS:    EKG:

## 2025-03-10 NOTE — PROGRESS NOTE ADULT - ASSESSMENT
73 yo M with hx trach/vent (last changed 10/23/24), CVA x2 with residual R hemiplegia, COPD, ESRD on HD MWF (last 2/17), pressure ulcer presenting to [facility] ED for leukocytosis. Patient was intially up in the cath lab earlier today with vascular surgery for fistulogram and noted to have a WBC of 18 and sent to ED. Patient nonverbal at baseline, history provided by son. Per wife, patient at normal baseline, somewhat able to make needs known and is not complaining of any pain. . Endocrinology was consulted for management of diabetes mellitus type 2- pt on TF with hyperglycemia.     #Type 2 Diabetes Mellitus  - HbA1c 6.4  ; home regimen: pt on basal/bolus and on TF outpt- as per wife pt on humalog 2 units TID and flextouch pen (tresiba?) 6 units at bedtime when pt was home- pt currently from [facility]- please confirm what [facility] has been doing with regards to pt insulin regimen.  -egfr: 23    Plan:   - FS goal 100-180: FS at goal today.    - Pt getting TF nepro carb stead bolus feeds of 290ml q 6 hours via PEG- pt was on NPH q 6 hours  - Continue 13 units of lantus QHS tonight  - Continue 5 units of Admelog q 6 hours (give prior to giving tube feed bolus). hold if holding TF.  - Continue low Admelog correction scale q 6 hours - will monitor FS to see if need to increase to moderate scale  - Please check FSG before q6h while on TF and if NPO  - Please update endocrine if any changes to tube feeding - if no longer bolus or if rate changes.   - RD consult  - hypoglycemia orderset prn  - extensively discussed importance of glycemic control to prevent micro- and macrovascular complications  - discussed importance of weight loss, exercise, and changing diet   - reviewed symptoms and management of hypoglycemia  - reviewed basics of insulin administration and pharmacokinetics, glucometer monitoring, as well as glycemic goals for outpatient setting  - Discharge planning: If continues bolus feeds on discharge then pt will continue basal/bolus regimen doses TBD on dc.     #Hypertension  - BP goal <130/80  -elevated BP  -Pt on norvasc and hydralazine  - Management as per primary team    #Hyperlipidemia  - check fasting lipid panel  -goal LDL <70  Plan:   - statin: on lipitor 20mg at bedtime via PEG    #ESRD on HD  renal following  monitor electrolytes    Bhupendra Hollis, [facility]  Nurse Practitioner  Division of Endocrinology & Diabetes  contact on teams    If before 9AM or after 5PM, or on weekends/holidays, please call the Endocrine answering service for assistance ([phone]).  For nonurgent matters, please email [email or web address] for assistance.

## 2025-03-10 NOTE — PROGRESS NOTE ADULT - SUBJECTIVE AND OBJECTIVE BOX
[facility]  AUGUST Carbajal Y. Patel, S. Shah, G. Casimir  [phone]  ([phone] - weekdays after 5pm and weekends)    Name: JIMMY BLAND  Age/Gender: 74y Male  MRN: [id number]    Interval History:  Notes reviewed.   Afebrile.     Allergies: No Known Allergies      Objective:  Vitals:   T(F): 98.4 (03-10-25 @ 08:30), Max: 98.4 (03-10-25 @ 08:30)  HR: 86 (03-10-25 @ 11:42) (82 - 93)  BP: 163/57 (03-10-25 @ 08:30) (142/47 - 179/63)  RR: 16 (03-10-25 @ 08:30) (15 - 17)  SpO2: 100% (03-10-25 @ 11:42) (98% - 100%)  Physical Examination:  General: frail appearing  HEENT: anicteric, tracheostomy, on vent  Cardio: S1, S2, normal rate  Resp: decreased breath sounds  Abd: Soft. Nondistended. PEG  Ext: no LE edema  Skin: warm, dry    Laboratory Studies:  CBC:                       8.0    21.01 )-----------( 302      ( 09 Mar 2025 05:20 )             25.3     WBC Trend:  21.01 03-09-25 @ 05:20  18.15 03-08-25 @ 05:30  15.35 03-07-25 @ 22:30  11.89 03-07-25 @ 07:10  13.03 03-06-25 @ 05:45  16.30 03-05-25 @ 11:34    CMP: 03-09    133[L]  |  90[L]  |  51[H]  ----------------------------<  119[H]  3.4[L]   |  27  |  3.05[H]    Ca    9.5      09 Mar 2025 05:20  Phos  4.0     03-09  Mg     2.80     03-09            Urinalysis Basic - ( 09 Mar 2025 05:20 )    Color: x / Appearance: x / SG: x / pH: x  Gluc: 119 mg/dL / Ketone: x  / Bili: x / Urobili: x   Blood: x / Protein: x / Nitrite: x   Leuk Esterase: x / RBC: x / WBC x   Sq Epi: x / Non Sq Epi: x / Bacteria: x      Microbiology: reviewed     Culture - Blood (collected 03-09-25 @ 05:20)  Source: Blood Blood-Peripheral  Preliminary Report (03-10-25 @ 09:02):    No growth at 24 hours    Culture - Blood (collected 03-09-25 @ 05:05)  Source: Blood Blood-Venous  Preliminary Report (03-10-25 @ 09:02):    No growth at 24 hours    Culture - Blood (collected 03-03-25 @ 16:27)  Source: Blood Blood-Peripheral  Final Report (03-08-25 @ 22:01):    No growth at 5 days    Culture - Blood (collected 03-03-25 @ 16:23)  Source: Blood Blood-Venous  Final Report (03-08-25 @ 22:01):    No growth at 5 days    Culture - Sputum (collected 03-03-25 @ 14:10)  Source: Trach Asp Tracheal Aspirate  Gram Stain (03-04-25 @ 04:28):    Few polymorphonuclear leukocytes per low power field    No Squamous epithelial cells per low power field    Moderate Gram Negative Rods seen per oil power field  Final Report (03-05-25 @ 16:12):    Numerous Pseudomonas aeruginosa (Carbapenem Resistant)    Commensal dominick consistent with body site  Organism: Pseudomonas aeruginosa (Carbapenem Resistant) (03-05-25 @ 16:12)  Organism: Pseudomonas aeruginosa (Carbapenem Resistant) (03-05-25 @ 16:12)      Method Type: CarbaR      -  Resistance Gene to Carbapenem: Nondet  Organism: Pseudomonas aeruginosa (Carbapenem Resistant) (03-05-25 @ 16:12)      Method Type: VIDA      -  Aztreonam: S <=4      -  Cefepime: S <=2      -  Ceftazidime: S 4      -  Ceftazidime/Avibactam: S <=4      -  Ceftolozane/tazobactam: S <=2      -  Ciprofloxacin: S <=0.25      -  Imipenem: R 8      -  Levofloxacin: S <=0.5      -  Meropenem: R 8      -  Piperacillin/Tazobactam: S <=8    Culture - Fungal, Bronchial (collected 02-26-25 @ 16:43)  Source: Bronchial Bronchial Lavage  Preliminary Report (03-05-25 @ 23:02):    No fungus isolated at 1 week.    Culture - Bronchial (collected 02-26-25 @ 16:43)  Source: Bronchial Bronchial Lavage  Gram Stain (02-26-25 @ 23:04):    Few polymorphonuclear leukocytes per low power field    No squamous epithelial cells    Few Gram Negative Rods per oil power field  Final Report (02-28-25 @ 17:24):    Few Pseudomonas aeruginosa    Commensal dominick consistent with body site  Organism: Pseudomonas aeruginosa (02-28-25 @ 17:24)  Organism: Pseudomonas aeruginosa (02-28-25 @ 17:24)      Method Type: VIDA      -  Aztreonam: S <=4      -  Cefepime: S <=2      -  Ceftazidime: S <=1      -  Ciprofloxacin: S <=0.25      -  Imipenem: S 2      -  Levofloxacin: S <=0.5      -  Meropenem: S <=1      -  Piperacillin/Tazobactam: S <=8        Radiology: reviewed     Medications:  albuterol    0.083% 2.5 milliGRAM(s) Nebulizer every 6 hours  amLODIPine   Tablet 10 milliGRAM(s) Oral daily  AQUAPHOR (petrolatum Ointment) 1 Application(s) Topical two times a day  atorvastatin 20 milliGRAM(s) Oral at bedtime  chlorhexidine 0.12% Liquid 15 milliLiter(s) Oral Mucosa every 12 hours  chlorhexidine 2% Cloths 1 Application(s) Topical daily  dextrose 5%. 1000 milliLiter(s) IV Continuous <Continuous>  dextrose 5%. 1000 milliLiter(s) IV Continuous <Continuous>  dextrose 50% Injectable 25 Gram(s) IV Push once  dextrose 50% Injectable 12.5 Gram(s) IV Push once  dextrose 50% Injectable 25 Gram(s) IV Push once  dextrose Oral Gel 15 Gram(s) Oral once PRN  epoetin reilly-epbx (RETACRIT) Injectable 22285 Unit(s) IV Push <User Schedule>  ferrous    sulfate Liquid 300 milliGRAM(s) Enteral Tube daily  glucagon  Injectable 1 milliGRAM(s) IntraMuscular once  heparin   Injectable 5000 Unit(s) SubCutaneous every 8 hours  hydrALAZINE 50 milliGRAM(s) Oral three times a day  insulin glargine Injectable (LANTUS) 13 Unit(s) SubCutaneous at bedtime  insulin lispro (ADMELOG) corrective regimen sliding scale   SubCutaneous every 6 hours  insulin lispro Injectable (ADMELOG) 5 Unit(s) SubCutaneous every 6 hours  Nephro-noam 1 Tablet(s) Oral daily  pantoprazole   Suspension 40 milliGRAM(s) Oral before breakfast  piperacillin/tazobactam IVPB.. 3.375 Gram(s) IV Intermittent once  sodium chloride 0.9% Bolus. 100 milliLiter(s) IV Bolus every 5 minutes PRN  sodium chloride 0.9% for Nebulization 3 milliLiter(s) Nebulizer every 6 hours    Antimicrobials:  piperacillin/tazobactam IVPB.. 3.375 Gram(s) IV Intermittent once

## 2025-03-10 NOTE — PROGRESS NOTE ADULT - ASSESSMENT
75 y/o M PMhx trach/vent (last changed 10/23/24), CVA x2 with residual R hemiplegia, COPD, ESRD on HD MWF who presented to ED for leukocytosis when initially planned for fistulogram and noted to have a WBC of 18.   he was treated for E faecium UTI, and  pseudomonas VAP s/p cefepime, completed 2/25  he developed fever and was started on zosyn  bronch cx grew pseudomonas    pseudomonas PNA  fever- resolved  febrile 3/3  - CXR 3/3 w/ near total L lung opacification  - started on zosyn  trach aspirate pseudomonas sensitivities reviewed    DVT  RUE US- age-indeterminate, non-occlusive deep vein thrombosis noted in the right brachial vein  on eliquis    ESRD  HD per nephrology    pt w/ increased leukocytosis, may be reactive  noted patient w/ recent bleeding from fistula s/p vascular eval  Recommendations  c/w zosyn to complete 7 days w/ last day today 3/10  monitor off antibiotics thereafter  aspiration precautions  contact isolation for candida auris  trend wbc, temps    Steven Torres M.D.  [facility]  [phone]  After 5pm on weekdays and all day on weekends - please call [phone]  Available on [employer] teams    Thank you for consulting us and involving us in the management of this patients case. In addition to reviewing history, imaging, documents, labs, microbiology, took into account antibiotic stewardship, local antibiogram and infection control strategies and potential transmission issues.

## 2025-03-10 NOTE — PROGRESS NOTE ADULT - ASSESSMENT
74-year-old male hx trach/vent - , CVA, COPD, stage renal disease on dialysis 4 times a week ([facility]) history of pressure ulcer.  Patient presents the ED today for elevated white count. nephrology consulted for dialysis needs    ESRD:  from [facility]  On HD [facility] via an A-V fistula. s/p fistulogram by vascular 2/18  Continue MWF in hospital  S/p HD on 3/5 and 3/7.  Prolonged bleeding post HD -- vascular consulted. no plan for intervention  Plan for HD today  consent from wife in dialysis unit.  monitor bmp    Hypertension  BP fluctuating; acceptable.  continue norvasc 10 daily and hydralazine increased to 50 tid 3/2  titrate hydralazine to 100mg TID if needed  max uf as tolerated  monitor BP    Anemia:  s/p 1 unit PRBC 2/23  epo with hd  iron sat >20  monitor Hgb  Transfuse for Hg < 7.    leukocytosis  sepsis work up per team  On Zosyn.    hyponatremia  in setting of esrd and hyperglycemia  optimize dm control  hd per schedule with UF  monitor Na    Hypokalemia  Dialyze w/ 3K bath as needed.  Monitor K.    Hypermagnesemia  HD per schedule.  Monitor Mg.    CKD - MBD:  PTH 25 - low for CKD stage.  Received PhosNaK x1 on 3/8.  PO4 improved.  Monitor PO4 and Ca daily.

## 2025-03-10 NOTE — PROGRESS NOTE ADULT - ASSESSMENT
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 Patent recently admitted in 6/2024 for left pontine and cerebellar CVA requiring tracheostomy. While at [facility] patient decannulated and passed SS with advanced diet to easy to chew with thin liquids, but complicated COVID requiring transfer to [facility] in 7/2024 and then ultimately discharged home. Per wife patient was doing well at home until 10/2024 when he was found with RLL with concern for aspiration requiring intubation, then tracheostomy, and ultimately discharged to NH with vent dependence in 11/2024. Patient now presented for RUE fistulogram and angioplasty with vascular, however course complicated by leukocytosis with concern for recurrent trachitis vs PNA and UTI, AOCD and hyperglycemia. Admitted to RCU for further management. Found with  PSA trachitis/ PNA and enterococcus faecium UTI. Course complicated by RUE brachial DVT and hypoxia with HD with concern for volume down vs PE?      NEUROLOGY  # Poor mentation second to metabolic encephalopathy vs psychosomatic/ depression   - Hx of L cerebellar and pontene embolic CVA x 2 with residual R hemiplegia   - AOx0, bedbound, and nonverbal at baseline per report, however per exam patient able to open eyes, able to follow commands on left (LUE>LLE) with SEVERE weakness, however noted with R hemiplegia per baseline  - Nods yes and no occasionally however keeps eyes closed likely psychosomatic vs metabolic encephalopathy with infections?   - Last CTH in 10/2024 with no acute findings   - Hold [facility] CT for now   - Monitor mentation     CARDIOVASCULAR  # Hx of HTN and HLD  - Continue on hydral 50 (increased 3/2)   - Continue on norvasc 10   - Monitor BP     # Incidental Pericardial effusion   - Incidental small pericardial effusion seen adjacent to right ventricle, however IVC appears flat and LE without edema with low concern for RV failure.   - Prior TTE reviewed from 4/2024 with normal RVLVSF with no pericardial effusion noted at the time.   - TTE 2/23 with EF 65, normal LVRVSF, mild LAD, normal RA, mild pulmonary hypertension, and small pericardial effusion noted adjacent to the anterior right ventricle with no echocardiographic evidence of tamponade  - Monitor hemodynamics     RESPIRATORY  # Respiratory failure second to PNA vs volume down vs PE?   - Hx of COPD with chronic respiratory failure with tracheostomy and vent dependence  - Admitted for angioplasty of RUE AVF, however course complicated by leukocytosis with concern for recurrent trachitis/ PNA.   - Course complicated by hypoxia during HD likely volume down vs migration of RUE brachial DVT with PE?   - Held volume removal, bolus given, and hypoxia improved.   - CTA CHEST without PE  - Course complicated by LLL mucous plug and IPV started with improvement   - Continue on albuterol, NS 0.9 and IPV  - Continue on vent 15/400/6/40    HEENT   # Hemoptysis   - Wife reported hemoptysis while at nursing home 2 weeks prior to admission   - No hemoptysis noted on admission   - Hemoptysis returned in house   - CTA CHEST 2/24 without PE  - Bronch 2/26 with no evidence of bleeding   - Bleeding resolved     GI  # Dysphagia with PEG   - Passed SS with advanced diet to easy to chew with thin liquids in 7/2024, however since recurrent aspiration in 10/2024 now with PEG/ vent dependence   - Continue on PEG TF and changed to bolus feeds   - Monitor tolerance     RENAL  # ESRD on HD   # Dehydration   - Resumed on HD with no flow issue from AVF   - Course complicated by hypoxia with concern for volume down given small IVC and elevated lactate on 2/21  - Volume removal held, lactate improved, and hypoxia improved.   - Continue on HD MWF in house per renal   - Return to HD MTRF outpatient   - Monitor renal function, electrolytes and UOP     # AVF trouble   # AVF oozing   - Presented for RUE fistulogram and angioplasty with vascular on 2/18   - Oozing from AVF post HD, surgicell placed, and AC held with improvement.  - Discussed with vascular and no need for AC given DVT as below is chronic   - RPT DOPPLER with again noted DVT in right stented brachial vein, however als noted with significantly elevated flow velocities with turbulence at the anastomotic site. Wall thickening noted at the proximal segment of the outflow vein, which is otherwise patent. Distal segment of the outflow vein appears patent without thrombosis evidence.   - May benefit from fistulogram   - Vascular following    # Hyponatremia likely volume down   - Concern for volume issues, however overall appears volume down with serum osmo 307  - Sodium 128 and improved with HD/ bolus during HD.   - Trend sodium with HD for now     # Elevated lactate   - Lactate elevated likely second to volume down?   - Post bolus lactate trending down from 3.3 to 2.7 to 1.3    INFECTIOUS DISEASE  # Recurrent LLL PNA   - CXR with LLL mucous plug   - POCUS with LLL consolidation vs atelectasis   - Recent BAL with PSA as below   - FULL RVP negative  - SCx with PSA  - Fevers returned and zosyn (3/3 -3/10) started empirically   - WBC rising with high grade temp, RPT CXR appears improved, and pending RPT BCx    # Hemoptysis   - Hemoptysis as above   - BAL with PSA as prior   - No fever and monitor off ABX     # Trachitis vs PNA/ UTI   - Hx of steno and PSA in sputum   - Flu/ COVID negative   - MRSA negative   - UCx with enterococcus   - SCx with  PSA   - Found with leukocytosis and started on cefepime and christiano (2/19) and vanco on HD days (2/21).   - No further steno seen in sputum and continue on cefepime (2/19 - 2/25) and vanco on HD days (2/21, 2/22 - 2/25)   - WBC increased likely reactive to hypoxic event and now improving  - ID following     # PPX   - MRSA PCR negative  - Candida auris PCR POSITIVE  - Continue on isolation precautions per IC    HEME  # AOCD   - Presented for angioplasty and found with anemia to 6.7 s/p 1U PRBC 2/19 and 1/2 PRBC 2/23   - Anemia panel with concern for AOCD   - Continue on EPO QIW and Fe   - Monitor HH     # Heparin resistance?   - PTT persistently low despite increasing heparin GTT   - Resume on heparin GTT and anti Xa level supra therapeutic on but PTT remained low  - Patient ultimately with concern for heparin resistance    VASCULAR  # RUE DVT   - RUE edema noted with age-indeterminate, non-occlusive deep vein thrombosis noted in the right brachial vein.   - Case discussed with vascular who recommends full AC   - CTH from prior with encephalomalcia, however no hx of intracranial bleed   - Prior UGIB while on AC, however discharged on eliquis in 7/2024 and completed 6 month course for RLE DVT   - Continue on heparin GTT, however held for hemoptysis and resistance   - Continued on argatroban with good tolerance and then changed to eliquis   - Case discussed vascular and DVT likely chronic and no need for eliquis     ENDOCRINE  # DM2 A1C 14.0 (1/2024) > 6.4 (2/2025)  - Presented with hyperglycemia and continued on lantus and ISS   - Increased lantus, however FS continues to trend in 200s and changed to NPH with improvement.  - TF interrupted for bronchoscopy and adjustment to bolus feeds and FS dropped.   - NPH stopped and FS improved, however trended back up with continuation of tube feeds.   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 Patent recently admitted in 6/2024 for left pontine and cerebellar CVA requiring tracheostomy. While at [facility] patient decannulated and passed SS with advanced diet to easy to chew with thin liquids, but complicated COVID requiring transfer to [facility] in 7/2024 and then ultimately discharged home. Per wife patient was doing well at home until 10/2024 when he was found with RLL with concern for aspiration requiring intubation, then tracheostomy, and ultimately discharged to NH with vent dependence in 11/2024. Patient now presented for RUE fistulogram and angioplasty with vascular, however course complicated by leukocytosis with concern for recurrent trachitis vs PNA and UTI, AOCD and hyperglycemia. Admitted to RCU for further management. Found with  PSA trachitis/ PNA and enterococcus faecium UTI. Course complicated by RUE brachial DVT and hypoxia with HD with concern for volume down vs PE? Volume removal held and hypoxia improved. AC given empirically and CTA CHEST with no PE. Case discussed with vascular and since brachial DVT appears old no need for chronic AC . Course further complicated by PSA LLL PNA and continued on zosyn.     NEUROLOGY  # Poor mentation second to metabolic encephalopathy vs psychosomatic/ depression   - Hx of L cerebellar and pontene embolic CVA x 2 with residual R hemiplegia   - AOx0, bedbound, and nonverbal at baseline per report, however per exam patient able to open eyes, able to follow commands on left (LUE>LLE) with SEVERE weakness, however noted with R hemiplegia per baseline  - Nods yes and no occasionally however keeps eyes closed likely psychosomatic vs metabolic encephalopathy with infections?   - Last CTH in 10/2024 with no acute findings   - Hold RPT CTH for now   - Monitor mentation     CARDIOVASCULAR  # Hx of HTN and HLD  - Continue on hydral 50 (increased 3/2)   - Continue on norvasc 10   - Monitor BP     # Incidental Pericardial effusion   - Incidental small pericardial effusion seen adjacent to right ventricle, however IVC appears flat and LE without edema with low concern for RV failure.   - Prior TTE reviewed from 4/2024 with normal RVLVSF with no pericardial effusion noted at the time.   - TTE 2/23 with EF 65, normal LVRVSF, mild LAD, normal RA, mild pulmonary hypertension, and small pericardial effusion noted adjacent to the anterior right ventricle with no echocardiographic evidence of tamponade  - Monitor hemodynamics     RESPIRATORY  # Respiratory failure second to PNA vs volume down vs PE?   - Hx of COPD with chronic respiratory failure with tracheostomy and vent dependence  - Admitted for angioplasty of RUE AVF, however course complicated by leukocytosis with concern for recurrent trachitis/ PNA.   - Course complicated by hypoxia during HD likely volume down vs migration of RUE brachial DVT with PE?   - Held volume removal, bolus given, and hypoxia improved.   - CTA CHEST without PE  - Course complicated by LLL mucous plug and IPV started with improvement   - Continue on albuterol, NS 0.9 and IPV  - Continue on vent 15/400/6/40    HEENT   # Hemoptysis   - Wife reported hemoptysis while at nursing home 2 weeks prior to admission   - No hemoptysis noted on admission   - Hemoptysis returned in house   - CTA CHEST 2/24 without PE  - Bronch 2/26 with no evidence of bleeding   - Bleeding resolved     GI  # Dysphagia with PEG   - Passed SS with advanced diet to easy to chew with thin liquids in 7/2024, however since recurrent aspiration in 10/2024 now with PEG/ vent dependence   - Continue on PEG TF and changed to bolus feeds   - Monitor tolerance     RENAL  # ESRD on HD   # Dehydration   - Resumed on HD with no flow issue from AVF   - Course complicated by hypoxia with concern for volume down given small IVC and elevated lactate on 2/21  - Volume removal held, lactate improved, and hypoxia improved.   - Continue on HD MWF in house per renal   - Return to HD MTRF outpatient   - Monitor renal function, electrolytes and UOP     # AVF trouble   # AVF oozing   - Presented for RUE fistulogram and angioplasty with vascular on 2/18   - Oozing from AVF post HD, surgicell placed, and AC held with improvement.  - Discussed with vascular and no need for AC given DVT as below is chronic   - RPT DOPPLER with again noted DVT in right stented brachial vein, however als noted with significantly elevated flow velocities with turbulence at the anastomotic site. Wall thickening noted at the proximal segment of the outflow vein, which is otherwise patent. Distal segment of the outflow vein appears patent without thrombosis evidence.   - Discussed with vascular and given patent AVF with no HD trouble no need for fistulogram at this time.     # Hyponatremia likely volume down   - Concern for volume issues, however overall appears volume down with serum osmo 307  - Sodium 128 and improved with HD/ bolus during HD.   - Trend sodium with HD for now     # Elevated lactate   - Lactate elevated likely second to volume down?   - Post bolus lactate trending down from 3.3 to 2.7 to 1.3    INFECTIOUS DISEASE  # Recurrent LLL PNA   - CXR with LLL mucous plug   - POCUS with LLL consolidation vs atelectasis   - Recent BAL with PSA as below   - FULL RVP negative  - SCx with PSA  - Fevers returned and zosyn (3/3 -3/10) started empirically   - WBC rising but appears nontoxic and CXR, secretions and BCx negative.  - Monitor off ABX    # Hemoptysis   - Hemoptysis as above   - BAL with PSA as prior   - No fever and monitor off ABX     # Trachitis vs PNA/ UTI   - Hx of steno and PSA in sputum   - Flu/ COVID negative   - MRSA negative   - UCx with enterococcus   - SCx with  PSA   - Found with leukocytosis and started on cefepime and christiano (2/19) and vanco on HD days (2/21).   - No further steno seen in sputum and continue on cefepime (2/19 - 2/25) and vanco on HD days (2/21, 2/22 - 2/25)   - WBC increased likely reactive to hypoxic event and now improving  - ID following     # PPX   - MRSA PCR negative  - Candida auris PCR POSITIVE  - Continue on isolation precautions per IC    HEME  # AOCD   - Presented for angioplasty and found with anemia to 6.7 s/p 1U PRBC 2/19 and 1/2 PRBC 2/23   - Anemia panel with concern for AOCD   - Continue on EPO QIW and Fe   - Monitor HH     # Heparin resistance?   - PTT persistently low despite increasing heparin GTT   - Resume on heparin GTT and anti Xa level supra therapeutic on but PTT remained low  - Patient ultimately with concern for heparin resistance    VASCULAR  # RUE DVT   - RUE edema noted with age-indeterminate, non-occlusive deep vein thrombosis noted in the right brachial vein.   - Case discussed with vascular who recommends full AC   - CTH from prior with encephalomalcia, however no hx of intracranial bleed   - Prior UGIB while on AC, however discharged on eliquis in 7/2024 and completed 6 month course for RLE DVT   - Continue on heparin GTT, however held for hemoptysis and resistance   - Continued on argatroban with good tolerance and then changed to eliquis   - Case discussed vascular and DVT likely chronic and no need for eliquis     ENDOCRINE  # DM2 A1C 14.0 (1/2024) > 6.4 (2/2025)  - Presented with hyperglycemia and continued on lantus and ISS   - Increased lantus, however FS continues to trend in 200s and changed to NPH with improvement.  - TF interrupted for bronchoscopy and adjustment to bolus feeds and FS dropped.   - NPH stopped and FS improved, however trended back up with continuation of tube feeds.   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 Patent recently admitted in 6/2024 for left pontine and cerebellar CVA requiring tracheostomy. While at [facility] patient decannulated and passed SS with advanced diet to easy to chew with thin liquids, but complicated COVID requiring transfer to [facility] in 7/2024 and then ultimately discharged home. Per wife patient was doing well at home until 10/2024 when he was found with RLL with concern for aspiration requiring intubation, then tracheostomy, and ultimately discharged to NH with vent dependence in 11/2024. Patient now presented for RUE fistulogram and angioplasty with vascular, however course complicated by leukocytosis with concern for recurrent trachitis vs PNA and UTI, AOCD and hyperglycemia. Admitted to RCU for further management. Found with  PSA trachitis/ PNA and enterococcus faecium UTI. Course complicated by RUE brachial DVT and hypoxia with HD with concern for volume down vs PE? Volume removal held and hypoxia improved. AC given empirically and CTA CHEST with no PE. Case discussed with vascular and since brachial DVT appears old no need for chronic AC . Course further complicated by PSA LLL PNA and continued on zosyn.     NEUROLOGY  # Poor mentation second to metabolic encephalopathy vs psychosomatic/ depression   - Hx of L cerebellar and pontene embolic CVA x 2 with residual R hemiplegia   - AOx0, bedbound, and nonverbal at baseline per report, however per exam patient able to open eyes, able to follow commands on left (LUE>LLE) with SEVERE weakness, however noted with R hemiplegia per baseline  - Nods yes and no occasionally however keeps eyes closed likely psychosomatic vs metabolic encephalopathy with infections?   - Last CTH in 10/2024 with no acute findings   - Hold RPT CTH for now   - Monitor mentation     CARDIOVASCULAR  # Hx of HTN and HLD  - Continue on hydral 50 (increased 3/2)   - Continue on norvasc 10   - Monitor BP     # Incidental Pericardial effusion   - Incidental small pericardial effusion seen adjacent to right ventricle, however IVC appears flat and LE without edema with low concern for RV failure.   - Prior TTE reviewed from 4/2024 with normal RVLVSF with no pericardial effusion noted at the time.   - TTE 2/23 with EF 65, normal LVRVSF, mild LAD, normal RA, mild pulmonary hypertension, and small pericardial effusion noted adjacent to the anterior right ventricle with no echocardiographic evidence of tamponade  - Monitor hemodynamics     RESPIRATORY  # Respiratory failure second to PNA vs volume down vs PE?   - Hx of COPD with chronic respiratory failure with tracheostomy and vent dependence  - Admitted for angioplasty of RUE AVF, however course complicated by leukocytosis with concern for recurrent trachitis/ PNA.   - Course complicated by hypoxia during HD likely volume down vs migration of RUE brachial DVT with PE?   - Held volume removal, bolus given, and hypoxia improved.   - CTA CHEST without PE  - Course complicated by LLL mucous plug and IPV started with improvement   - Continue on albuterol, NS 0.9 and IPV  - Continue on vent 15/400/6/40    HEENT   # Hemoptysis   - Wife reported hemoptysis while at nursing home 2 weeks prior to admission   - No hemoptysis noted on admission   - Hemoptysis returned in house   - CTA CHEST 2/24 without PE  - Bronch 2/26 with no evidence of bleeding   - Bleeding resolved     GI  # Dysphagia with PEG   - Passed SS with advanced diet to easy to chew with thin liquids in 7/2024, however since recurrent aspiration in 10/2024 now with PEG/ vent dependence   - Continue on PEG TF and changed to bolus feeds   - Monitor tolerance     RENAL  # ESRD on HD   # Dehydration   - Resumed on HD with no flow issue from AVF   - Course complicated by hypoxia with concern for volume down given small IVC and elevated lactate on 2/21  - Volume removal held, lactate improved, and hypoxia improved.   - Continue on HD MWF in house per renal   - Return to HD MTRF outpatient   - Monitor renal function, electrolytes and UOP     # AVF trouble   # AVF oozing   - Presented for RUE fistulogram and angioplasty with vascular on 2/18   - Oozing from AVF post HD, surgicell placed, and AC held with improvement.  - Discussed with vascular and no need for AC given DVT as below is chronic   - RPT DOPPLER with again noted DVT in right stented brachial vein, however als noted with significantly elevated flow velocities with turbulence at the anastomotic site. Wall thickening noted at the proximal segment of the outflow vein, which is otherwise patent. Distal segment of the outflow vein appears patent without thrombosis evidence.   - Discussed with vascular and given patent AVF with no HD trouble no need for fistulogram at this time.     # Hyponatremia likely volume down   - Concern for volume issues, however overall appears volume down with serum osmo 307  - Sodium 128 and improved with HD/ bolus during HD.   - Trend sodium with HD for now     # Elevated lactate   - Lactate elevated likely second to volume down?   - Post bolus lactate trending down from 3.3 to 2.7 to 1.3    INFECTIOUS DISEASE  # Recurrent LLL PNA   - CXR with LLL mucous plug   - POCUS with LLL consolidation vs atelectasis   - Recent BAL with PSA as below   - FULL RVP negative  - SCx with PSA  - Fevers returned and zosyn (3/3 -3/10) started empirically   - WBC rising but appears nontoxic and CXR, secretions and BCx negative.  - Monitor off ABX    # Hemoptysis   - Hemoptysis as above   - BAL with PSA as prior   - No fever and monitor off ABX     # Trachitis vs PNA/ UTI   - Hx of steno and PSA in sputum   - Flu/ COVID negative   - MRSA negative   - UCx with enterococcus   - SCx with  PSA   - Found with leukocytosis and started on cefepime and christiano (2/19) and vanco on HD days (2/21).   - No further steno seen in sputum and continue on cefepime (2/19 - 2/25) and vanco on HD days (2/21, 2/22 - 2/25)   - WBC increased likely reactive to hypoxic event and now improving  - ID following     # PPX   - MRSA PCR negative  - Candida auris PCR POSITIVE  - Continue on isolation precautions per IC    HEME  # AOCD   - Presented for angioplasty and found with anemia to 6.7 s/p 1U PRBC 2/19 and 1/2 PRBC 2/23   - Anemia panel with concern for AOCD   - Continue on EPO QIW and Fe   - Monitor HH     # Heparin resistance?   - PTT persistently low despite increasing heparin GTT   - Resume on heparin GTT and anti Xa level supra therapeutic on but PTT remained low  - Patient ultimately with concern for heparin resistance    VASCULAR  # RUE DVT   - RUE edema noted with age-indeterminate, non-occlusive deep vein thrombosis noted in the right brachial vein.   - Case discussed with vascular who recommends full AC   - CTH from prior with encephalomalcia, however no hx of intracranial bleed   - Prior UGIB while on AC, however discharged on eliquis in 7/2024 and completed 6 month course for RLE DVT   - Continue on heparin GTT, however held for hemoptysis and resistance   - Continued on argatroban with good tolerance and then changed to eliquis   - Case discussed vascular and DVT likely chronic and no need for eliquis     ENDOCRINE  # DM2 A1C 14.0 (1/2024) > 6.4 (2/2025)  - Presented with hyperglycemia and continued on lantus and ISS   - Increased lantus, however FS continues to trend in 200s and changed to NPH with improvement.  - TF interrupted for bronchoscopy and adjustment to bolus feeds and FS dropped.   - NPH stopped and FS improved, however trended back up with continuation of tube feeds.   - NPH resumed, however FS continues to waxe and wane and case discussed with endocrine   - Continue on lantus 13 and lispro 5 with ISS for now   - Monitor FS and adjust as needed  - Endocrine following     ETHICS/ GOC    - Palliative discussion on 3/5  - Remains FULL CODE     DISPO - Back to NH when able

## 2025-03-10 NOTE — PROGRESS NOTE ADULT - ASSESSMENT
Assessment: 74M PMHx HTN, IDDM2, CVA x2 ( with R. sided weakness and dysphagia s/p PEG tube), bedbound with multiple pressure ulcers, hx of RLE DVT (s/p apixaban course), recent admission for respiratory failure s/p tracheostomy (10/23/24), ESRD on HD ([facility]) now s/p RUE fistulogram with angioplasty (02/18/25, [facility]) found to have profound hyperglycemia, anemia, and leukocytosis admitted to RCU. Had duplex showing DVT in R brachial vein, currently on Eliquis. Reconsulted for prolonged bleeding after HD. Palpable thrill noted on AVF and no bleeding on physical exam.    RUE brachial vein thrombus is probably chronic in nature and pt does not need to be placed on AC, which could be a cause of prolonged bleeding after HD    Plan:  - AVF duplex demonstrating patent fistula  - No plan for fistulogram at this time given patency and palpable thrill  - Will sign off at this time, please call back with further questions or concerns    C Team Surgery  [id number]

## 2025-03-10 NOTE — PROGRESS NOTE ADULT - SUBJECTIVE AND OBJECTIVE BOX
SURGERY DAILY PROGRESS NOTE    SUBJECTIVE: No acute events overnight. Patient seen and evaluated on AM rounds.    OBJECTIVE:  Vital Signs Last 24 Hrs  T(C): 36.3 (10 Mar 2025 04:00), Max: 36.8 (09 Mar 2025 14:00)  T(F): 97.4 (10 Mar 2025 04:00), Max: 98.3 (09 Mar 2025 14:00)  HR: 84 (10 Mar 2025 07:36) (81 - 92)  BP: 152/68 (10 Mar 2025 04:00) (142/47 - 179/63)  BP(mean): 88 (10 Mar 2025 04:00) (72 - 94)  RR: 15 (10 Mar 2025 04:00) (15 - 17)  SpO2: 100% (10 Mar 2025 07:36) (100% - 100%)    Parameters below as of 10 Mar 2025 04:00  Patient On (Oxygen Delivery Method): ventilator    I&O's Detail    09 Mar 2025 07:01  -  10 Mar 2025 07:00  --------------------------------------------------------  IN:    Enteral Tube Flush: 200 mL    Nepro with Carb Steady: 580 mL  Total IN: 780 mL    OUT:  Total OUT: 0 mL    Total NET: 780 mL    Daily   MEDICATIONS  (STANDING):  albuterol    0.083% 2.5 milliGRAM(s) Nebulizer every 6 hours  amLODIPine   Tablet 10 milliGRAM(s) Oral daily  AQUAPHOR (petrolatum Ointment) 1 Application(s) Topical two times a day  atorvastatin 20 milliGRAM(s) Oral at bedtime  chlorhexidine 0.12% Liquid 15 milliLiter(s) Oral Mucosa every 12 hours  chlorhexidine 2% Cloths 1 Application(s) Topical daily  dextrose 5%. 1000 milliLiter(s) (100 mL/Hr) IV Continuous <Continuous>  dextrose 5%. 1000 milliLiter(s) (50 mL/Hr) IV Continuous <Continuous>  dextrose 50% Injectable 25 Gram(s) IV Push once  dextrose 50% Injectable 12.5 Gram(s) IV Push once  dextrose 50% Injectable 25 Gram(s) IV Push once  epoetin reilly-epbx (RETACRIT) Injectable 22947 Unit(s) IV Push <User Schedule>  ferrous    sulfate Liquid 300 milliGRAM(s) Enteral Tube daily  glucagon  Injectable 1 milliGRAM(s) IntraMuscular once  hydrALAZINE 50 milliGRAM(s) Oral three times a day  insulin glargine Injectable (LANTUS) 13 Unit(s) SubCutaneous at bedtime  insulin lispro (ADMELOG) corrective regimen sliding scale   SubCutaneous every 6 hours  insulin lispro Injectable (ADMELOG) 5 Unit(s) SubCutaneous every 6 hours  Nephro-noam 1 Tablet(s) Oral daily  pantoprazole   Suspension 40 milliGRAM(s) Oral before breakfast  piperacillin/tazobactam IVPB.. 4.5 Gram(s) IV Intermittent every 12 hours  sodium chloride 0.9% for Nebulization 3 milliLiter(s) Nebulizer every 6 hours    MEDICATIONS  (PRN):  dextrose Oral Gel 15 Gram(s) Oral once PRN Blood Glucose LESS THAN 70 milliGRAM(s)/deciliter  sodium chloride 0.9% Bolus. 100 milliLiter(s) IV Bolus every 5 minutes PRN SBP LESS THAN or EQUAL to 80 mmHg    LABS:                        8.0    21.01 )-----------( 302      ( 09 Mar 2025 05:20 )             25.3     03-09    133[L]  |  90[L]  |  51[H]  ----------------------------<  119[H]  3.4[L]   |  27  |  3.05[H]    Ca    9.5      09 Mar 2025 05:20  Phos  4.0     03-09  Mg     2.80     03-09    Urinalysis Basic - ( 09 Mar 2025 05:20 )    Color: x / Appearance: x / SG: x / pH: x  Gluc: 119 mg/dL / Ketone: x  / Bili: x / Urobili: x   Blood: x / Protein: x / Nitrite: x   Leuk Esterase: x / RBC: x / WBC x   Sq Epi: x / Non Sq Epi: x / Bacteria: x    Physical Examination:  GEN: NAD, resting quietly  VASCULAR: Palpable radial pulse, aneurysmal AVF with palpable thrill, contracted, R hand warm, unable to cooperate with motor exam.

## 2025-03-10 NOTE — PROGRESS NOTE ADULT - SUBJECTIVE AND OBJECTIVE BOX
Chief Complaint: type 2 DM    History: Pt trach/PEG tolerating bolus tube feeds. FS at goal today.     MEDICATIONS  (STANDING):  albuterol    0.083% 2.5 milliGRAM(s) Nebulizer every 6 hours  amLODIPine   Tablet 10 milliGRAM(s) Oral daily  AQUAPHOR (petrolatum Ointment) 1 Application(s) Topical two times a day  atorvastatin 20 milliGRAM(s) Oral at bedtime  chlorhexidine 0.12% Liquid 15 milliLiter(s) Oral Mucosa every 12 hours  chlorhexidine 2% Cloths 1 Application(s) Topical daily  dextrose 5%. 1000 milliLiter(s) (100 mL/Hr) IV Continuous <Continuous>  dextrose 5%. 1000 milliLiter(s) (50 mL/Hr) IV Continuous <Continuous>  dextrose 50% Injectable 25 Gram(s) IV Push once  dextrose 50% Injectable 12.5 Gram(s) IV Push once  dextrose 50% Injectable 25 Gram(s) IV Push once  epoetin reilly-epbx (RETACRIT) Injectable 95552 Unit(s) IV Push <User Schedule>  ferrous    sulfate Liquid 300 milliGRAM(s) Enteral Tube daily  glucagon  Injectable 1 milliGRAM(s) IntraMuscular once  heparin   Injectable 5000 Unit(s) SubCutaneous every 8 hours  hydrALAZINE 50 milliGRAM(s) Oral three times a day  insulin glargine Injectable (LANTUS) 13 Unit(s) SubCutaneous at bedtime  insulin lispro (ADMELOG) corrective regimen sliding scale   SubCutaneous every 6 hours  insulin lispro Injectable (ADMELOG) 5 Unit(s) SubCutaneous every 6 hours  Nephro-noam 1 Tablet(s) Oral daily  pantoprazole   Suspension 40 milliGRAM(s) Oral before breakfast  piperacillin/tazobactam IVPB.. 3.375 Gram(s) IV Intermittent once  sodium chloride 0.9% for Nebulization 3 milliLiter(s) Nebulizer every 6 hours    MEDICATIONS  (PRN):  dextrose Oral Gel 15 Gram(s) Oral once PRN Blood Glucose LESS THAN 70 milliGRAM(s)/deciliter  sodium chloride 0.9% Bolus. 100 milliLiter(s) IV Bolus every 5 minutes PRN SBP LESS THAN or EQUAL to 80 mmHg      Allergies    No Known Allergies    Intolerances      Review of Systems:    UNABLE TO OBTAIN    PHYSICAL EXAM:  VITALS: T(C): 36.9 (03-10-25 @ 08:30)  T(F): 98.4 (03-10-25 @ 08:30), Max: 98.4 (03-10-25 @ 08:30)  HR: 86 (03-10-25 @ 11:42) (82 - 93)  BP: 163/57 (03-10-25 @ 08:30) (142/47 - 179/63)  RR:  (15 - 17)  SpO2:  (98% - 100%)  Wt(kg): --  GENERAL: NAD  EYES: No proptosis  RESPIRATORY: non labored breathing- trach/ventilator  CARDIOVASCULAR: Regular rate and rhythm  GI: PEG on TF- tolerating bolus feeds      CAPILLARY BLOOD GLUCOSE      POCT Blood Glucose.: 162 mg/dL (10 Mar 2025 11:15)  POCT Blood Glucose.: 133 mg/dL (10 Mar 2025 06:07)  POCT Blood Glucose.: 155 mg/dL (09 Mar 2025 23:41)  POCT Blood Glucose.: 198 mg/dL (09 Mar 2025 17:31)      03-09    133[L]  |  90[L]  |  51[H]  ----------------------------<  119[H]  3.4[L]   |  27  |  3.05[H]    eGFR: 21[L]    Ca    9.5      03-09  Mg     2.80     03-09  Phos  4.0     03-09            Thyroid Function Tests:        A1C with Estimated Average Glucose Result: 6.4 % (02-19-25 @ 06:45)  A1C with Estimated Average Glucose Result: 4.8 % (10-05-24 @ 08:37)  A1C with Estimated Average Glucose Result: 5.9 % (07-16-24 @ 10:25)  A1C with Estimated Average Glucose Result: 6.9 % (04-30-24 @ 06:03)          Diet, NPO with Tube Feed:   Tube Feeding Modality: Gastrostomy  Nepro with Carb Steady (NEPRORTH)  Total Volume for 24 Hours (mL): 1160  Bolus  Total Volume of Bolus (mL):  290  Total # of Feeds: 4  Tube Feed Frequency: Every 6 hours   Tube Feed Start Time: 12:00  Bolus Feed Rate (mL per Hour): 290   Bolus Feed Duration (in Hours): 0  Liquid Protein Supplement     Qty per Day:  1 (02-25-25 @ 13:43)

## 2025-03-10 NOTE — PROGRESS NOTE ADULT - SUBJECTIVE AND OBJECTIVE BOX
[facility]Y [facility]ACT[facility]   MD ELIANE BHAGAT MD ANGELA WONG, PA    TEL:  OFFICE: [phone]  From 5pm-7am Answering Service [phone]    -- RENAL FOLLOW UP NOTE ---Date of Service 03-10-25 @ 17:29    Patient is a 74y old  Male who presents with a chief complaint of 2/18/25 was in cath lab earlier today with vascular surgery for fistulogram and noted to have a WBC of 18 and sent to ED and admitted (04 Mar 2025 12:22)      Patient seen and examined at bedside.     VITALS:  T(F): 98.4 (03-10-25 @ 16:50), Max: 98.5 (03-10-25 @ 14:00)  HR: 93 (03-10-25 @ 16:50)  BP: 160/56 (03-10-25 @ 16:50)  RR: 18 (03-10-25 @ 16:50)  SpO2: 100% (03-10-25 @ 16:50)  Wt(kg): --    03-09 @ 07:01  -  03-10 @ 07:00  --------------------------------------------------------  IN: 780 mL / OUT: 0 mL / NET: 780 mL          PHYSICAL EXAM:  General: Nonverbal  Neck: + trach  Respiratory: CTAB, no wheezes, rales or rhonchi  Cardiovascular: S1, S2, RRR  Gastrointestinal: +peg  Extremities: No peripheral edema    Hospital Medications:   MEDICATIONS  (STANDING):  albuterol    0.083% 2.5 milliGRAM(s) Nebulizer every 6 hours  amLODIPine   Tablet 10 milliGRAM(s) Oral daily  AQUAPHOR (petrolatum Ointment) 1 Application(s) Topical two times a day  atorvastatin 20 milliGRAM(s) Oral at bedtime  chlorhexidine 0.12% Liquid 15 milliLiter(s) Oral Mucosa every 12 hours  chlorhexidine 2% Cloths 1 Application(s) Topical daily  dextrose 5%. 1000 milliLiter(s) (100 mL/Hr) IV Continuous <Continuous>  dextrose 5%. 1000 milliLiter(s) (50 mL/Hr) IV Continuous <Continuous>  dextrose 50% Injectable 25 Gram(s) IV Push once  dextrose 50% Injectable 12.5 Gram(s) IV Push once  dextrose 50% Injectable 25 Gram(s) IV Push once  epoetin reilly-epbx (RETACRIT) Injectable 24981 Unit(s) IV Push <User Schedule>  ferrous    sulfate Liquid 300 milliGRAM(s) Enteral Tube daily  glucagon  Injectable 1 milliGRAM(s) IntraMuscular once  heparin   Injectable 5000 Unit(s) SubCutaneous every 8 hours  hydrALAZINE 50 milliGRAM(s) Oral three times a day  insulin glargine Injectable (LANTUS) 13 Unit(s) SubCutaneous at bedtime  insulin lispro (ADMELOG) corrective regimen sliding scale   SubCutaneous every 6 hours  insulin lispro Injectable (ADMELOG) 5 Unit(s) SubCutaneous every 6 hours  Nephro-noam 1 Tablet(s) Oral daily  pantoprazole   Suspension 40 milliGRAM(s) Oral before breakfast  piperacillin/tazobactam IVPB.. 3.375 Gram(s) IV Intermittent once  sodium chloride 0.9% for Nebulization 3 milliLiter(s) Nebulizer every 6 hours      LABS:  03-09    133[L]  |  90[L]  |  51[H]  ----------------------------<  119[H]  3.4[L]   |  27  |  3.05[H]    Ca    9.5      09 Mar 2025 05:20  Phos  4.0     03-09  Mg     2.80     03-09      Creatinine Trend: 3.05 <--, 2.28 <--, 3.24 <--, 2.47 <--, 3.47 <--, 2.80 <--                                8.0    21.01 )-----------( 302      ( 09 Mar 2025 05:20 )             25.3     Urine Studies:  Urinalysis - [03-09-25 @ 05:20]      Color  / Appearance  / SG  / pH       Gluc 119 / Ketone   / Bili  / Urobili        Blood  / Protein  / Leuk Est  / Nitrite       RBC  / WBC  / Hyaline  / Gran  / Sq Epi  / Non Sq Epi  / Bacteria       Iron 46, TIBC 142, %sat 32      [02-19-25 @ 11:39]  Ferritin 3601      [02-19-25 @ 11:39]  PTH -- (Ca --)      [03-09-25 @ 05:20]   25  PTH -- (Ca --)      [05-26-24 @ 01:20]   122  TSH 1.660      [10-05-24 @ 08:37]  Lipid: chol --, , HDL --, LDL --      [10-18-24 @ 06:00]    HBsAb 654.8      [02-19-25 @ 19:05]  HBsAg Nonreact      [02-19-25 @ 19:05]  HBcAb Nonreact      [02-19-25 @ 19:05]  HCV 0.19, Nonreact      [02-19-25 @ 19:05]      RADIOLOGY & ADDITIONAL STUDIES:

## 2025-03-10 NOTE — PROGRESS NOTE ADULT - NS ATTEND AMEND GEN_ALL_CORE FT
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 c/b acute hypoxemic and hypercapnic respiratory failure s/p ETT intubation/MV with failure to wean from ventilator s/p tracheostomy with hospital course further complicated by aspiration and B cereus bacteremia and RLE DVT followed by citrobacter PNA, GIB i/s/o AOCD, and fistula stenosis s/p fistulogram 6/4 ultimately transferred to acute rehab, decannulated, and discharged home until 10/2024 when pt returned to OSH with aspiration PNA c/b hypoxemic respiratory failure requiring ETT intubation/MV followed by tracheostomy placement and d/c to LTCF ([facility]) 11/2024 now presenting to [facility] on 2/18/25 for RUE fistulogram/angioplasty with vascular sx but with hospital course c/b concern fo recurrent PNA with uncontrolled hyperglycemia admitted to RCU for further medical management.      # Encephalopathy 2/2 CVA  # Acute on chronic hypoxemic and hypercapnic respiratory failure. S/P Trach now x2.   # Mucus plugging, atelecatasis  # oropharyngeal dysphagia   # GIB  # Bleeding AV fistula  # pressure ulcer.   # ESRD on HD  # DVT- likely chronic  # C. auris colonization  - CVA with recent strokes, c/w statins, unable to tolerate full a/c  - c/w vent, previously decannulated now retrached. Unclear if can come off vent. C/W albuteral and NS nebs  - C/W peg tube feeds  - No longer with GIB, monitor CBC  - Bleeding AV fistula, per vascular no acute interventions are planned  - Wound care for pressure ulcers-  - Per vascular DVT likely chronic, would monitor off a/c  - HD per renal  - Completing abx for a total of 7 day course. Has leukocytosis still. ID following.   - DVT ppx- SQH  - Dispo- Full code. Prognosis overall is poor.     Dispo pending medical optimization. Pt full code. DVT ppx in place. Pt found on this admission to have (+) candida auris colonization (as per wife, pt's neighbor at LTCF was recently found to have it and pt was moved out of room at that time.) Will continue to update family and discuss plan of care throughout hospitalization, discussed at length with pt's wife and daughter at bedside daily. 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 c/b acute hypoxemic and hypercapnic respiratory failure s/p ETT intubation/MV with failure to wean from ventilator s/p tracheostomy with hospital course further complicated by aspiration and B cereus bacteremia and RLE DVT followed by citrobacter PNA, GIB i/s/o AOCD, and fistula stenosis s/p fistulogram 6/4 ultimately transferred to acute rehab, decannulated, and discharged home until 10/2024 when pt returned to OSH with aspiration PNA c/b hypoxemic respiratory failure requiring ETT intubation/MV followed by tracheostomy placement and d/c to LTCF ([facility]) 11/2024 now presenting to [facility] on 2/18/25 for RUE fistulogram/angioplasty with vascular sx but with hospital course c/b concern fo recurrent PNA with uncontrolled hyperglycemia admitted to RCU for further medical management.      # Encephalopathy 2/2 CVA  # Acute on chronic hypoxemic and hypercapnic respiratory failure. S/P Trach now x2.   # Mucus plugging, atelecatasis  # oropharyngeal dysphagia   # GIB  # Bleeding AV fistula  # pressure ulcer.   # ESRD on HD  # DVT- likely chronic  # C. auris colonization  - CVA with recent strokes, c/w statins, unable to tolerate full a/c  - c/w vent, previously decannulated now retrached. Unclear if can come off vent. C/W albuteral and NS nebs  - C/W peg tube feeds  - No longer with GIB, monitor CBC  - Bleeding AV fistula, per vascular no acute interventions are planned  - Wound care for pressure ulcers-  - Per vascular DVT likely chronic, would monitor off a/c  - HD per renal  - Completing abx for a total of 7 day course. Has leukocytosis still. ID following.   - DVT ppx- SQH  - Dispo- Full code. Prognosis overall is poor.

## 2025-03-11 LAB
ANION GAP SERPL CALC-SCNC: 14 MMOL/L — SIGNIFICANT CHANGE UP (ref 7–14)
BASOPHILS # BLD AUTO: 0.04 K/UL — SIGNIFICANT CHANGE UP (ref 0–0.2)
BUN SERPL-MCNC: 34 MG/DL — HIGH (ref 7–23)
CALCIUM SERPL-MCNC: 9.1 MG/DL — SIGNIFICANT CHANGE UP (ref 8.4–10.5)
CHLORIDE SERPL-SCNC: 93 MMOL/L — LOW (ref 98–107)
CO2 SERPL-SCNC: 27 MMOL/L — SIGNIFICANT CHANGE UP (ref 22–31)
CREAT SERPL-MCNC: 2.44 MG/DL — HIGH (ref 0.5–1.3)
EGFR: 27 ML/MIN/1.73M2 — LOW
EGFR: 27 ML/MIN/1.73M2 — LOW
EOSINOPHIL # BLD AUTO: 0.04 K/UL — SIGNIFICANT CHANGE UP (ref 0–0.5)
EOSINOPHIL NFR BLD AUTO: 0.3 % — SIGNIFICANT CHANGE UP (ref 0–6)
GLUCOSE BLDC GLUCOMTR-MCNC: 101 MG/DL — HIGH (ref 70–99)
GLUCOSE BLDC GLUCOMTR-MCNC: 115 MG/DL — HIGH (ref 70–99)
GLUCOSE BLDC GLUCOMTR-MCNC: 138 MG/DL — HIGH (ref 70–99)
GLUCOSE BLDC GLUCOMTR-MCNC: 247 MG/DL — HIGH (ref 70–99)
GLUCOSE BLDC GLUCOMTR-MCNC: 60 MG/DL — LOW (ref 70–99)
GLUCOSE BLDC GLUCOMTR-MCNC: 75 MG/DL — SIGNIFICANT CHANGE UP (ref 70–99)
GLUCOSE SERPL-MCNC: 56 MG/DL — LOW (ref 70–99)
HCT VFR BLD CALC: 24 % — LOW (ref 39–50)
HGB BLD-MCNC: 7.4 G/DL — LOW (ref 13–17)
IANC: 12.56 K/UL — HIGH (ref 1.8–7.4)
IMM GRANULOCYTES NFR BLD AUTO: 0.6 % — SIGNIFICANT CHANGE UP (ref 0–0.9)
LYMPHOCYTES # BLD AUTO: 0.74 K/UL — LOW (ref 1–3.3)
LYMPHOCYTES # BLD AUTO: 5.2 % — LOW (ref 13–44)
MAGNESIUM SERPL-MCNC: 2.4 MG/DL — SIGNIFICANT CHANGE UP (ref 1.6–2.6)
MCHC RBC-ENTMCNC: 23.3 PG — LOW (ref 27–34)
MCHC RBC-ENTMCNC: 30.8 G/DL — LOW (ref 32–36)
MCV RBC AUTO: 75.7 FL — LOW (ref 80–100)
MONOCYTES # BLD AUTO: 0.73 K/UL — SIGNIFICANT CHANGE UP (ref 0–0.9)
MONOCYTES NFR BLD AUTO: 5.1 % — SIGNIFICANT CHANGE UP (ref 2–14)
NEUTROPHILS # BLD AUTO: 12.56 K/UL — HIGH (ref 1.8–7.4)
NEUTROPHILS NFR BLD AUTO: 88.5 % — HIGH (ref 43–77)
NRBC # BLD AUTO: 0.06 K/UL — HIGH (ref 0–0)
NRBC # FLD: 0.06 K/UL — HIGH (ref 0–0)
NRBC BLD AUTO-RTO: 0 /100 WBCS — SIGNIFICANT CHANGE UP (ref 0–0)
PLATELET # BLD AUTO: 266 K/UL — SIGNIFICANT CHANGE UP (ref 150–400)
POTASSIUM SERPL-MCNC: 3.2 MMOL/L — LOW (ref 3.5–5.3)
POTASSIUM SERPL-SCNC: 3.2 MMOL/L — LOW (ref 3.5–5.3)
RBC # BLD: 3.17 M/UL — LOW (ref 4.2–5.8)
RBC # FLD: 17.6 % — HIGH (ref 10.3–14.5)
SODIUM SERPL-SCNC: 134 MMOL/L — LOW (ref 135–145)
WBC # BLD: 14.2 K/UL — HIGH (ref 3.8–10.5)
WBC # FLD AUTO: 14.2 K/UL — HIGH (ref 3.8–10.5)

## 2025-03-11 PROCEDURE — 99233 SBSQ HOSP IP/OBS HIGH 50: CPT

## 2025-03-11 PROCEDURE — 99232 SBSQ HOSP IP/OBS MODERATE 35: CPT

## 2025-03-11 RX ORDER — INSULIN GLARGINE-YFGN 100 [IU]/ML
10 INJECTION, SOLUTION SUBCUTANEOUS AT BEDTIME
Refills: 0 | Status: DISCONTINUED | OUTPATIENT
Start: 2025-03-11 | End: 2025-03-11

## 2025-03-11 RX ORDER — INSULIN GLARGINE-YFGN 100 [IU]/ML
8 INJECTION, SOLUTION SUBCUTANEOUS AT BEDTIME
Refills: 0 | Status: DISCONTINUED | OUTPATIENT
Start: 2025-03-11 | End: 2025-03-12

## 2025-03-11 RX ORDER — INSULIN GLARGINE-YFGN 100 [IU]/ML
5 INJECTION, SOLUTION SUBCUTANEOUS AT BEDTIME
Refills: 0 | Status: DISCONTINUED | OUTPATIENT
Start: 2025-03-11 | End: 2025-03-11

## 2025-03-11 RX ORDER — INSULIN LISPRO 100 U/ML
4 INJECTION, SOLUTION INTRAVENOUS; SUBCUTANEOUS EVERY 6 HOURS
Refills: 0 | Status: DISCONTINUED | OUTPATIENT
Start: 2025-03-11 | End: 2025-03-12

## 2025-03-11 RX ORDER — HEPARIN SODIUM 1000 [USP'U]/ML
5000 INJECTION INTRAVENOUS; SUBCUTANEOUS EVERY 12 HOURS
Refills: 0 | Status: DISCONTINUED | OUTPATIENT
Start: 2025-03-11 | End: 2025-03-19

## 2025-03-11 RX ADMIN — Medication 50 MILLIGRAM(S): at 05:25

## 2025-03-11 RX ADMIN — Medication 15 MILLILITER(S): at 17:12

## 2025-03-11 RX ADMIN — Medication 2.5 MILLIGRAM(S): at 09:41

## 2025-03-11 RX ADMIN — HEPARIN SODIUM 5000 UNIT(S): 1000 INJECTION INTRAVENOUS; SUBCUTANEOUS at 13:40

## 2025-03-11 RX ADMIN — Medication 50 MILLIGRAM(S): at 13:40

## 2025-03-11 RX ADMIN — Medication 2.5 MILLIGRAM(S): at 16:56

## 2025-03-11 RX ADMIN — AMLODIPINE BESYLATE 10 MILLIGRAM(S): 10 TABLET ORAL at 05:24

## 2025-03-11 RX ADMIN — Medication 50 MILLIGRAM(S): at 21:24

## 2025-03-11 RX ADMIN — Medication 1 TABLET(S): at 11:50

## 2025-03-11 RX ADMIN — Medication 300 MILLIGRAM(S): at 11:50

## 2025-03-11 RX ADMIN — Medication 2.5 MILLIGRAM(S): at 22:04

## 2025-03-11 RX ADMIN — Medication 40 MILLIGRAM(S): at 07:48

## 2025-03-11 RX ADMIN — ATORVASTATIN CALCIUM 20 MILLIGRAM(S): 80 TABLET, FILM COATED ORAL at 21:24

## 2025-03-11 RX ADMIN — WHITE PETROLATUM 1 APPLICATION(S): 1 OINTMENT TOPICAL at 05:25

## 2025-03-11 RX ADMIN — Medication 2.5 MILLIGRAM(S): at 04:18

## 2025-03-11 RX ADMIN — HEPARIN SODIUM 5000 UNIT(S): 1000 INJECTION INTRAVENOUS; SUBCUTANEOUS at 17:12

## 2025-03-11 RX ADMIN — Medication 15 MILLILITER(S): at 05:24

## 2025-03-11 RX ADMIN — Medication 40 MILLIEQUIVALENT(S): at 11:49

## 2025-03-11 RX ADMIN — INSULIN LISPRO 4 UNIT(S): 100 INJECTION, SOLUTION INTRAVENOUS; SUBCUTANEOUS at 12:01

## 2025-03-11 RX ADMIN — INSULIN LISPRO 4 UNIT(S): 100 INJECTION, SOLUTION INTRAVENOUS; SUBCUTANEOUS at 17:15

## 2025-03-11 RX ADMIN — INSULIN GLARGINE-YFGN 8 UNIT(S): 100 INJECTION, SOLUTION SUBCUTANEOUS at 23:53

## 2025-03-11 RX ADMIN — Medication 1 APPLICATION(S): at 11:50

## 2025-03-11 RX ADMIN — WHITE PETROLATUM 1 APPLICATION(S): 1 OINTMENT TOPICAL at 17:12

## 2025-03-11 RX ADMIN — HEPARIN SODIUM 5000 UNIT(S): 1000 INJECTION INTRAVENOUS; SUBCUTANEOUS at 05:24

## 2025-03-11 RX ADMIN — INSULIN LISPRO 2: 100 INJECTION, SOLUTION INTRAVENOUS; SUBCUTANEOUS at 23:53

## 2025-03-11 NOTE — PROGRESS NOTE ADULT - SUBJECTIVE AND OBJECTIVE BOX
[facility]OLOGY [facility]ACT[facility]   MD ELIANE BHAGAT MD ANGELA WONG, PA    TEL:  OFFICE: [phone]  From 5pm-7am Answering Service [phone]    -- RENAL FOLLOW UP NOTE ---Date of Service 03-11-25 @ 11:25    Patient is a 74y old  Male who presents with a chief complaint of 2/18/25 was in cath lab earlier today with vascular surgery for fistulogram and noted to have a WBC of 18 and sent to ED and admitted (04 Mar 2025 12:22)      Patient seen and examined at bedside.   VITALS:  T(F): 98.4 (03-11-25 @ 08:00), Max: 99.6 (03-10-25 @ 23:49)  HR: 100 (03-11-25 @ 09:42)  BP: 147/99 (03-11-25 @ 08:00)  RR: 19 (03-11-25 @ 08:00)  SpO2: 100% (03-11-25 @ 09:42)  Wt(kg): --    03-10 @ 07:01  -  03-11 @ 07:00  --------------------------------------------------------  IN: 2550 mL / OUT: 4500 mL / NET: -1950 mL          PHYSICAL EXAM:  General: Nonverbal  Neck: +trach  Respiratory: CTAB, no wheezes, rales or rhonchi  Cardiovascular: S1, S2, RRR  Gastrointestinal: +peg  Extremities: No peripheral edema    Hospital Medications:   MEDICATIONS  (STANDING):  albuterol    0.083% 2.5 milliGRAM(s) Nebulizer every 6 hours  amLODIPine   Tablet 10 milliGRAM(s) Oral daily  AQUAPHOR (petrolatum Ointment) 1 Application(s) Topical two times a day  atorvastatin 20 milliGRAM(s) Oral at bedtime  chlorhexidine 0.12% Liquid 15 milliLiter(s) Oral Mucosa every 12 hours  chlorhexidine 2% Cloths 1 Application(s) Topical daily  dextrose 5%. 1000 milliLiter(s) (100 mL/Hr) IV Continuous <Continuous>  dextrose 5%. 1000 milliLiter(s) (50 mL/Hr) IV Continuous <Continuous>  dextrose 50% Injectable 25 Gram(s) IV Push once  dextrose 50% Injectable 12.5 Gram(s) IV Push once  dextrose 50% Injectable 25 Gram(s) IV Push once  epoetin reilly-epbx (RETACRIT) Injectable 00514 Unit(s) IV Push <User Schedule>  ferrous    sulfate Liquid 300 milliGRAM(s) Enteral Tube daily  glucagon  Injectable 1 milliGRAM(s) IntraMuscular once  heparin   Injectable 5000 Unit(s) SubCutaneous every 8 hours  hydrALAZINE 50 milliGRAM(s) Oral three times a day  insulin glargine Injectable (LANTUS) 5 Unit(s) SubCutaneous at bedtime  insulin lispro (ADMELOG) corrective regimen sliding scale   SubCutaneous every 6 hours  insulin lispro Injectable (ADMELOG) 4 Unit(s) SubCutaneous every 6 hours  Nephro-noam 1 Tablet(s) Oral daily  pantoprazole   Suspension 40 milliGRAM(s) Oral before breakfast  potassium chloride   Powder 40 milliEquivalent(s) Oral once  sodium chloride 0.9% for Nebulization 3 milliLiter(s) Nebulizer every 6 hours      LABS:  03-11    134[L]  |  93[L]  |  34[H]  ----------------------------<  56[L]  3.2[L]   |  27  |  2.44[H]    Ca    9.1      11 Mar 2025 05:50  Phos  2.7     03-11  Mg     2.40     03-11      Creatinine Trend: 2.44 <--, 3.98 <--, 3.05 <--, 2.28 <--, 3.24 <--, 2.47 <--, 3.47 <--    Phosphorus: 2.7 mg/dL (03-11 @ 05:50)  Phosphorus: 5.0 mg/dL (03-10 @ 17:00)                              7.4    14.20 )-----------( 266      ( 11 Mar 2025 05:50 )             24.0     Urine Studies:  Urinalysis - [03-11-25 @ 05:50]      Color  / Appearance  / SG  / pH       Gluc 56 / Ketone   / Bili  / Urobili        Blood  / Protein  / Leuk Est  / Nitrite       RBC  / WBC  / Hyaline  / Gran  / Sq Epi  / Non Sq Epi  / Bacteria       Iron 46, TIBC 142, %sat 32      [02-19-25 @ 11:39]  Ferritin 3601      [02-19-25 @ 11:39]  PTH -- (Ca --)      [03-09-25 @ 05:20]   25  PTH -- (Ca --)      [05-26-24 @ 01:20]   122  TSH 1.660      [10-05-24 @ 08:37]  Lipid: chol --, , HDL --, LDL --      [10-18-24 @ 06:00]    HBsAb 654.8      [02-19-25 @ 19:05]  HBsAg Nonreact      [02-19-25 @ 19:05]  HBcAb Nonreact      [02-19-25 @ 19:05]  HCV 0.19, Nonreact      [02-19-25 @ 19:05]      RADIOLOGY & ADDITIONAL STUDIES:

## 2025-03-11 NOTE — PROGRESS NOTE ADULT - ASSESSMENT
73 y/o M PMhx trach/vent (last changed 10/23/24), CVA x2 with residual R hemiplegia, COPD, ESRD on HD MWF who presented to ED for leukocytosis when initially planned for fistulogram and noted to have a WBC of 18.   he was treated for E faecium UTI, and  pseudomonas VAP s/p cefepime, completed 2/25  he developed fever and was started on zosyn  bronch cx grew pseudomonas    pseudomonas PNA  fever- resolved  febrile 3/3  - CXR 3/3 w/ near total L lung opacification  trach aspirate pseudomonas sensitivities reviewed  s/p zosyn x 7 days, completed 3/10    DVT  RUE US- age-indeterminate, non-occlusive deep vein thrombosis noted in the right brachial vein  on eliquis    ESRD  HD per nephrology    leukocytosis   possibly reactive  significantly improved today    Recommendations  continue off antibiotics  aspiration precautions  contact isolation for candida auris  discharge planning    Steven Torres M.D.  [facility]  [phone]  After 5pm on weekdays and all day on weekends - please call [phone]  Available on [employer] teams    Thank you for consulting us and involving us in the management of this patients case. In addition to reviewing history, imaging, documents, labs, microbiology, took into account antibiotic stewardship, local antibiogram and infection control strategies and potential transmission issues.

## 2025-03-11 NOTE — PROGRESS NOTE ADULT - SUBJECTIVE AND OBJECTIVE BOX
Chief Complaint: type 2 DM    History: Pt trach/PEG tolerating bolus tube feeds. FS was at goal all day yesterday and this morning FS was below goal- s/w primary team and there was some issue with tube leaking and plan is to change it today- morning admelog coverage held, afternoon FS at goal.     MEDICATIONS  (STANDING):  albuterol    0.083% 2.5 milliGRAM(s) Nebulizer every 6 hours  amLODIPine   Tablet 10 milliGRAM(s) Oral daily  AQUAPHOR (petrolatum Ointment) 1 Application(s) Topical two times a day  atorvastatin 20 milliGRAM(s) Oral at bedtime  chlorhexidine 0.12% Liquid 15 milliLiter(s) Oral Mucosa every 12 hours  chlorhexidine 2% Cloths 1 Application(s) Topical daily  dextrose 5%. 1000 milliLiter(s) (100 mL/Hr) IV Continuous <Continuous>  dextrose 5%. 1000 milliLiter(s) (50 mL/Hr) IV Continuous <Continuous>  dextrose 50% Injectable 25 Gram(s) IV Push once  dextrose 50% Injectable 12.5 Gram(s) IV Push once  dextrose 50% Injectable 25 Gram(s) IV Push once  epoetin reilly-epbx (RETACRIT) Injectable 33959 Unit(s) IV Push <User Schedule>  ferrous    sulfate Liquid 300 milliGRAM(s) Enteral Tube daily  glucagon  Injectable 1 milliGRAM(s) IntraMuscular once  heparin   Injectable 5000 Unit(s) SubCutaneous every 8 hours  hydrALAZINE 50 milliGRAM(s) Oral three times a day  insulin glargine Injectable (LANTUS) 8 Unit(s) SubCutaneous at bedtime  insulin lispro (ADMELOG) corrective regimen sliding scale   SubCutaneous every 6 hours  insulin lispro Injectable (ADMELOG) 4 Unit(s) SubCutaneous every 6 hours  Nephro-noam 1 Tablet(s) Oral daily  pantoprazole   Suspension 40 milliGRAM(s) Oral before breakfast  sodium chloride 0.9% for Nebulization 3 milliLiter(s) Nebulizer every 6 hours    MEDICATIONS  (PRN):  dextrose Oral Gel 15 Gram(s) Oral once PRN Blood Glucose LESS THAN 70 milliGRAM(s)/deciliter  sodium chloride 0.9% Bolus. 100 milliLiter(s) IV Bolus every 5 minutes PRN SBP LESS THAN or EQUAL to 80 mmHg      Allergies    No Known Allergies    Intolerances      Review of Systems:    UNABLE TO OBTAIN    PHYSICAL EXAM:  VITALS: T(C): 36.8 (03-11-25 @ 12:00)  T(F): 98.2 (03-11-25 @ 12:00), Max: 99.6 (03-10-25 @ 23:49)  HR: 87 (03-11-25 @ 12:00) (83 - 109)  BP: 131/55 (03-11-25 @ 12:00) (127/57 - 180/70)  RR:  (16 - 22)  SpO2:  (100% - 100%)  Wt(kg): --  GENERAL: NAD  EYES: No proptosis  RESPIRATORY: non labored breathing- trach/ventilator  CARDIOVASCULAR: Regular rate and rhythm  GI: PEG on TF- tolerating bolus feeds      CAPILLARY BLOOD GLUCOSE      POCT Blood Glucose.: 115 mg/dL (11 Mar 2025 12:00)  POCT Blood Glucose.: 101 mg/dL (11 Mar 2025 06:44)  POCT Blood Glucose.: 75 mg/dL (11 Mar 2025 05:48)  POCT Blood Glucose.: 60 mg/dL (11 Mar 2025 05:47)  POCT Blood Glucose.: 125 mg/dL (10 Mar 2025 23:36)  POCT Blood Glucose.: 105 mg/dL (10 Mar 2025 17:21)      03-11    134[L]  |  93[L]  |  34[H]  ----------------------------<  56[L]  3.2[L]   |  27  |  2.44[H]    eGFR: 27[L]    Ca    9.1      03-11  Mg     2.40     03-11  Phos  2.7     03-11            Thyroid Function Tests:        A1C with Estimated Average Glucose Result: 6.4 % (02-19-25 @ 06:45)  A1C with Estimated Average Glucose Result: 4.8 % (10-05-24 @ 08:37)  A1C with Estimated Average Glucose Result: 5.9 % (07-16-24 @ 10:25)  A1C with Estimated Average Glucose Result: 6.9 % (04-30-24 @ 06:03)          Diet, NPO with Tube Feed:   Tube Feeding Modality: Gastrostomy  Nepro with Carb Steady (NEPRORTH)  Total Volume for 24 Hours (mL): 1160  Bolus  Total Volume of Bolus (mL):  290  Total # of Feeds: 4  Tube Feed Frequency: Every 6 hours   Tube Feed Start Time: 12:00  Bolus Feed Rate (mL per Hour): 290   Bolus Feed Duration (in Hours): 0  Liquid Protein Supplement     Qty per Day:  1 (02-25-25 @ 13:43)

## 2025-03-11 NOTE — PROGRESS NOTE ADULT - NS ATTEND AMEND GEN_ALL_CORE FT
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 c/b acute hypoxemic and hypercapnic respiratory failure s/p ETT intubation/MV with failure to wean from ventilator s/p tracheostomy with hospital course further complicated by aspiration and B cereus bacteremia and RLE DVT followed by citrobacter PNA, GIB i/s/o AOCD, and fistula stenosis s/p fistulogram 6/4 ultimately transferred to acute rehab, decannulated, and discharged home until 10/2024 when pt returned to OSH with aspiration PNA c/b hypoxemic respiratory failure requiring ETT intubation/MV followed by tracheostomy placement and d/c to LTCF ([facility]) 11/2024 now presenting to [facility] on 2/18/25 for RUE fistulogram/angioplasty with vascular sx but with hospital course c/b concern fo recurrent PNA with uncontrolled hyperglycemia admitted to RCU for further medical management.    Per vascular no further intervention, no need to be on full a/c 2/2 to fistula bleeding. Now being monitored off abx. WBC elevated but improving without abx. PEG connector pending replacement.     # Encephalopathy 2/2 CVA  # Acute on chronic hypoxemic and hypercapnic respiratory failure. S/P Trach now x2.   # Mucus plugging, atelecatasis  # oropharyngeal dysphagia   # GIB  # Bleeding AV fistula  # pressure ulcer.   # ESRD on HD  # DVT- likely chronic  # C. auris colonization  - CVA with recent strokes, c/w statins, unable to tolerate full a/c  - c/w vent, previously decannulated now retrached. Unclear if can come off vent. C/W albuteral and NS nebs. PS as tolerated.   - C/W peg tube feeds  - No longer with GIB, monitor CBC  - Bleeding AV fistula, per vascular no acute interventions are planned  - Wound care for pressure ulcers-  - Per vascular DVT likely chronic, would monitor off a/c  - HD per renal  - Completing abx for a total of 7 day course. Has leukocytosis still but improving off abx. ID following.   - DVT ppx- SQH  - Dispo- Full code. Prognosis overall is poor.

## 2025-03-11 NOTE — PROGRESS NOTE ADULT - ASSESSMENT
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 Patent recently admitted in 6/2024 for left pontine and cerebellar CVA requiring tracheostomy. While at [facility] patient decannulated and passed SS with advanced diet to easy to chew with thin liquids, but complicated COVID requiring transfer to [facility] in 7/2024 and then ultimately discharged home. Per wife patient was doing well at home until 10/2024 when he was found with RLL with concern for aspiration requiring intubation, then tracheostomy, and ultimately discharged to NH with vent dependence in 11/2024. Patient now presented for RUE fistulogram and angioplasty with vascular, however course complicated by leukocytosis with concern for recurrent trachitis vs PNA and UTI, AOCD and hyperglycemia. Admitted to RCU for further management. Found with  PSA trachitis/ PNA and enterococcus faecium UTI. Course complicated by RUE brachial DVT and hypoxia with HD with concern for volume down vs PE? Volume removal held and hypoxia improved. AC given empirically and CTA CHEST with no PE. Case discussed with vascular and since brachial DVT appears old no need for chronic AC . Course further complicated by PSA LLL PNA and continued on zosyn.     NEUROLOGY  # Poor mentation second to metabolic encephalopathy vs psychosomatic/ depression   - Hx of L cerebellar and pontene embolic CVA x 2 with residual R hemiplegia   - AOx0, bedbound, and nonverbal at baseline per report, however per exam patient able to open eyes, able to follow commands on left (LUE>LLE) with SEVERE weakness, however noted with R hemiplegia per baseline  - Nods yes and no occasionally however keeps eyes closed likely psychosomatic vs metabolic encephalopathy with infections?   - Last CTH in 10/2024 with no acute findings   - Hold RPT CTH for now   - Monitor mentation     CARDIOVASCULAR  # Hx of HTN and HLD  - Continue on hydral 50 (increased 3/2)   - Continue on norvasc 10   - Monitor BP     # Incidental Pericardial effusion   - Incidental small pericardial effusion seen adjacent to right ventricle, however IVC appears flat and LE without edema with low concern for RV failure.   - Prior TTE reviewed from 4/2024 with normal RVLVSF with no pericardial effusion noted at the time.   - TTE 2/23 with EF 65, normal LVRVSF, mild LAD, normal RA, mild pulmonary hypertension, and small pericardial effusion noted adjacent to the anterior right ventricle with no echocardiographic evidence of tamponade  - Monitor hemodynamics     RESPIRATORY  # Respiratory failure second to PNA vs volume down vs PE?   - Hx of COPD with chronic respiratory failure with tracheostomy and vent dependence  - Admitted for angioplasty of RUE AVF, however course complicated by leukocytosis with concern for recurrent trachitis/ PNA.   - Course complicated by hypoxia during HD likely volume down vs migration of RUE brachial DVT with PE?   - Held volume removal, bolus given, and hypoxia improved.   - CTA CHEST without PE  - Course complicated by LLL mucous plug and IPV started with improvement   - Continue on albuterol, NS 0.9 and IPV  - Continue on vent 15/400/6/40    HEENT   # Hemoptysis   - Wife reported hemoptysis while at nursing home 2 weeks prior to admission   - No hemoptysis noted on admission   - Hemoptysis returned in house   - CTA CHEST 2/24 without PE  - Bronch 2/26 with no evidence of bleeding   - Bleeding resolved     GI  # Dysphagia with PEG   - Passed SS with advanced diet to easy to chew with thin liquids in 7/2024, however since recurrent aspiration in 10/2024 now with PEG/ vent dependence   - Continue on PEG TF and changed to bolus feeds   - Monitor tolerance     RENAL  # ESRD on HD   # Dehydration   - Resumed on HD with no flow issue from AVF   - Course complicated by hypoxia with concern for volume down given small IVC and elevated lactate on 2/21  - Volume removal held, lactate improved, and hypoxia improved.   - Continue on HD MWF in house per renal   - Return to HD MTRF outpatient   - Monitor renal function, electrolytes and UOP     # AVF trouble   # AVF oozing   - Presented for RUE fistulogram and angioplasty with vascular on 2/18   - Oozing from AVF post HD, surgicell placed, and AC held with improvement.  - Discussed with vascular and no need for AC given DVT as below is chronic   - RPT DOPPLER with again noted DVT in right stented brachial vein, however als noted with significantly elevated flow velocities with turbulence at the anastomotic site. Wall thickening noted at the proximal segment of the outflow vein, which is otherwise patent. Distal segment of the outflow vein appears patent without thrombosis evidence.   - Discussed with vascular and given patent AVF with no HD trouble no need for fistulogram at this time.     # Hyponatremia likely volume down   - Concern for volume issues, however overall appears volume down with serum osmo 307  - Sodium 128 and improved with HD/ bolus during HD.   - Trend sodium with HD for now     # Elevated lactate   - Lactate elevated likely second to volume down?   - Post bolus lactate trending down from 3.3 to 2.7 to 1.3    INFECTIOUS DISEASE  # Recurrent LLL PNA   - CXR with LLL mucous plug   - POCUS with LLL consolidation vs atelectasis   - Recent BAL with PSA as below   - FULL RVP negative  - SCx with PSA  - Fevers returned and zosyn (3/3 -3/10) started empirically   - WBC rising but appears nontoxic and CXR, secretions and BCx negative.  - Monitor off ABX    # Hemoptysis   - Hemoptysis as above   - BAL with PSA as prior   - No fever and monitor off ABX     # Trachitis vs PNA/ UTI   - Hx of steno and PSA in sputum   - Flu/ COVID negative   - MRSA negative   - UCx with enterococcus   - SCx with  PSA   - Found with leukocytosis and started on cefepime and christiano (2/19) and vanco on HD days (2/21).   - No further steno seen in sputum and continue on cefepime (2/19 - 2/25) and vanco on HD days (2/21, 2/22 - 2/25)   - WBC increased likely reactive to hypoxic event and now improving  - ID following     # PPX   - MRSA PCR negative  - Candida auris PCR POSITIVE  - Continue on isolation precautions per IC    HEME  # AOCD   - Presented for angioplasty and found with anemia to 6.7 s/p 1U PRBC 2/19 and 1/2 PRBC 2/23   - Anemia panel with concern for AOCD   - Continue on EPO QIW and Fe   - Monitor HH     # Heparin resistance?   - PTT persistently low despite increasing heparin GTT   - Resume on heparin GTT and anti Xa level supra therapeutic on but PTT remained low  - Patient ultimately with concern for heparin resistance    VASCULAR  # RUE DVT   - RUE edema noted with age-indeterminate, non-occlusive deep vein thrombosis noted in the right brachial vein.   - Case discussed with vascular who recommends full AC   - CTH from prior with encephalomalcia, however no hx of intracranial bleed   - Prior UGIB while on AC, however discharged on eliquis in 7/2024 and completed 6 month course for RLE DVT   - Continue on heparin GTT, however held for hemoptysis and resistance   - Continued on argatroban with good tolerance and then changed to eliquis   - Case discussed vascular and DVT likely chronic and no need for eliquis     ENDOCRINE  # DM2 A1C 14.0 (1/2024) > 6.4 (2/2025)  - Presented with hyperglycemia and continued on lantus and ISS   - Increased lantus, however FS continues to trend in 200s and changed to NPH with improvement.  - TF interrupted for bronchoscopy and adjustment to bolus feeds and FS dropped.   - NPH stopped and FS improved, however trended back up with continuation of tube feeds.   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 Patent recently admitted in 6/2024 for left pontine and cerebellar CVA requiring tracheostomy. While at [facility] patient decannulated and passed SS with advanced diet to easy to chew with thin liquids, but complicated COVID requiring transfer to [facility] in 7/2024 and then ultimately discharged home. Per wife patient was doing well at home until 10/2024 when he was found with RLL with concern for aspiration requiring intubation, then tracheostomy, and ultimately discharged to NH with vent dependence in 11/2024. Patient now presented for RUE fistulogram and angioplasty with vascular, however course complicated by leukocytosis with concern for recurrent trachitis vs PNA and UTI, AOCD and hyperglycemia. Admitted to RCU for further management. Found with  PSA trachitis/ PNA and enterococcus faecium UTI. Course complicated by RUE brachial DVT and hypoxia with HD with concern for volume down vs PE? Volume removal held and hypoxia improved. AC given empirically and CTA CHEST with no PE. Case discussed with vascular and since brachial DVT appears old no need for chronic AC . Course further complicated by PSA LLL PNA and continued on zosyn.     NEUROLOGY  # Poor mentation second to metabolic encephalopathy vs psychosomatic/ depression   - Hx of L cerebellar and pontene embolic CVA x 2 with residual R hemiplegia   - AOx0, bedbound, and nonverbal at baseline per report, however per exam patient able to open eyes, able to follow commands on left (LUE>LLE) with SEVERE weakness, however noted with R hemiplegia per baseline  - Nods yes and no occasionally however keeps eyes closed likely psychosomatic vs metabolic encephalopathy with infections?   - Last CTH in 10/2024 with no acute findings   - Hold RPT CTH for now   - Monitor mentation     CARDIOVASCULAR  # Hx of HTN and HLD  - Continue on hydral 50 (increased 3/2)   - Continue on norvasc 10   - Monitor BP     # Incidental Pericardial effusion   - Incidental small pericardial effusion seen adjacent to right ventricle, however IVC appears flat and LE without edema with low concern for RV failure.   - Prior TTE reviewed from 4/2024 with normal RVLVSF with no pericardial effusion noted at the time.   - TTE 2/23 with EF 65, normal LVRVSF, mild LAD, normal RA, mild pulmonary hypertension, and small pericardial effusion noted adjacent to the anterior right ventricle with no echocardiographic evidence of tamponade  - Monitor hemodynamics     RESPIRATORY  # Respiratory failure second to PNA vs volume down vs PE?   - Hx of COPD with chronic respiratory failure with tracheostomy and vent dependence  - Admitted for angioplasty of RUE AVF, however course complicated by leukocytosis with concern for recurrent trachitis/ PNA.   - Course complicated by hypoxia during HD likely volume down vs migration of RUE brachial DVT with PE?   - Held volume removal, bolus given, and hypoxia improved.   - CTA CHEST without PE  - Course complicated by LLL mucous plug and IPV started with improvement   - Continue on albuterol, NS 0.9 and IPV  - Continue on vent 15/400/6/40    HEENT   # Hemoptysis   - Wife reported hemoptysis while at nursing home 2 weeks prior to admission   - No hemoptysis noted on admission   - Hemoptysis returned in house   - CTA CHEST 2/24 without PE  - Bronch 2/26 with no evidence of bleeding   - Bleeding resolved     GI  # Dysphagia with PEG   - Passed SS with advanced diet to easy to chew with thin liquids in 7/2024, however since recurrent aspiration in 10/2024 now with PEG/ vent dependence   - Continue on PEG TF and changed to bolus feeds   - Monitor tolerance     RENAL  # ESRD on HD   # Dehydration   - Resumed on HD with no flow issue from AVF   - Course complicated by hypoxia with concern for volume down given small IVC and elevated lactate on 2/21  - Volume removal held, lactate improved, and hypoxia improved.   - Continue on HD MWF in house per renal   - Return to HD MTRF outpatient   - Monitor renal function, electrolytes and UOP     # AVF trouble   # AVF oozing   - Presented for RUE fistulogram and angioplasty with vascular on 2/18   - Oozing from AVF post HD, surgicell placed, and AC held with improvement.  - Discussed with vascular and no need for AC given DVT as below is chronic   - RPT DOPPLER with again noted DVT in right stented brachial vein, however als noted with significantly elevated flow velocities with turbulence at the anastomotic site. Wall thickening noted at the proximal segment of the outflow vein, which is otherwise patent. Distal segment of the outflow vein appears patent without thrombosis evidence.   - Discussed with vascular and given patent AVF with no HD trouble no need for fistulogram at this time.     # Hyponatremia likely volume down   - Concern for volume issues, however overall appears volume down with serum osmo 307  - Sodium 128 and improved with HD/ bolus during HD.   - Trend sodium with HD for now     # Elevated lactate   - Lactate elevated likely second to volume down?   - Post bolus lactate trending down from 3.3 to 2.7 to 1.3    INFECTIOUS DISEASE  # Recurrent LLL PNA   - CXR with LLL mucous plug   - POCUS with LLL consolidation vs atelectasis   - Recent BAL with PSA as below   - FULL RVP negative  - SCx with PSA  - Fevers returned and zosyn (3/3 -3/10) started empirically   - WBC rising but appears nontoxic and afebrile; CXR, secretions and BCx negative.  - Monitor off ABX, last dose of Zosyn yesterday    # Hemoptysis   - Hemoptysis as above   - BAL with PSA as prior   - No fever and monitor off ABX     # Trachitis vs PNA/ UTI   - Hx of steno and PSA in sputum   - Flu/ COVID negative   - MRSA negative   - UCx with enterococcus   - SCx with  PSA   - Found with leukocytosis and started on cefepime and christiano (2/19) and vanco on HD days (2/21).   - No further steno seen in sputum and continue on cefepime (2/19 - 2/25) and vanco on HD days (2/21, 2/22 - 2/25)   - WBC increased likely reactive to hypoxic event and now improving  - ID following     # PPX   - MRSA PCR negative  - Candida auris PCR POSITIVE  - Continue on isolation precautions per IC    HEME  # AOCD   - Presented for angioplasty and found with anemia to 6.7 s/p 1U PRBC 2/19 and 1/2 PRBC 2/23   - Anemia panel with concern for AOCD   - Continue on EPO QIW and Fe   - Monitor HH     # Heparin resistance?   - PTT persistently low despite increasing heparin GTT   - Resume on heparin GTT and anti Xa level supra therapeutic on but PTT remained low  - Patient ultimately with concern for heparin resistance    VASCULAR  # RUE DVT   - RUE edema noted with age-indeterminate, non-occlusive deep vein thrombosis noted in the right brachial vein.   - Case discussed with vascular who recommends full AC   - CTH from prior with encephalomalcia, however no hx of intracranial bleed   - Prior UGIB while on AC, however discharged on eliquis in 7/2024 and completed 6 month course for RLE DVT   - Continue on heparin GTT, however held for hemoptysis and resistance   - Continued on argatroban with good tolerance and then changed to eliquis   - Case discussed vascular and DVT likely chronic and no need for eliquis     ENDOCRINE  # DM2 A1C 14.0 (1/2024) > 6.4 (2/2025)  - Presented with hyperglycemia and continued on lantus and ISS   - Increased lantus, however FS continues to trend in 200s and changed to NPH with improvement.  - TF interrupted for bronchoscopy and adjustment to bolus feeds and FS dropped.   - NPH stopped and FS improved, however trended back up with continuation of tube feeds.   - NPH resumed, however FS continues to wax and wane and case discussed with endocrine   - morning hypoglycemic episode (3/11), changed lantus to 8 per endocrine. Continue with lispro 5 with ISS for now.   - Monitor FS and adjust as needed  - Endocrine following     ETHICS/ GOC    - Palliative discussion on 3/5  - Remains FULL CODE     DISPO - Back to NH when able

## 2025-03-11 NOTE — PROGRESS NOTE ADULT - SUBJECTIVE AND OBJECTIVE BOX
CHIEF COMPLAINT:Patient is a 74y old  Male who presents with a chief complaint of 2/18/25 was in cath lab earlier today with vascular surgery for fistulogram and noted to have a WBC of 18 and sent to ED and admitted (04 Mar 2025 12:22)      INTERVAL EVENTS:     ROS: Seen by bedside during AM rounds     OBJECTIVE:  ICU Vital Signs Last 24 Hrs  T(C): 37.1 (11 Mar 2025 05:27), Max: 37.6 (10 Mar 2025 23:49)  T(F): 98.8 (11 Mar 2025 05:27), Max: 99.6 (10 Mar 2025 23:49)  HR: 83 (11 Mar 2025 07:53) (83 - 109)  BP: 127/57 (11 Mar 2025 05:27) (127/57 - 180/70)  BP(mean): 85 (10 Mar 2025 08:30) (85 - 85)  ABP: --  ABP(mean): --  RR: 16 (11 Mar 2025 05:27) (16 - 22)  SpO2: 100% (11 Mar 2025 07:53) (98% - 100%)    O2 Parameters below as of 11 Mar 2025 05:27  Patient On (Oxygen Delivery Method): ventilator  O2 Flow (L/min): 40        Mode: CPAP with PS, FiO2: 40, PEEP: 6, PS: 10, MAP: 10    03-10 @ 07:01  -  03-11 @ 07:00  --------------------------------------------------------  IN: 2550 mL / OUT: 4500 mL / NET: -1950 mL      CAPILLARY BLOOD GLUCOSE      POCT Blood Glucose.: 101 mg/dL (11 Mar 2025 06:44)      PHYSICAL EXAM:  General:   HEENT:   Lymph Nodes:  Neck:   Respiratory:   Cardiovascular:   Abdomen:   Extremities:   Skin:   Neurological:  Psychiatry:    Mode: CPAP with PS  FiO2: 40  PEEP: 6  PS: 10  MAP: 10      HOSPITAL MEDICATIONS:  MEDICATIONS  (STANDING):  albuterol    0.083% 2.5 milliGRAM(s) Nebulizer every 6 hours  amLODIPine   Tablet 10 milliGRAM(s) Oral daily  AQUAPHOR (petrolatum Ointment) 1 Application(s) Topical two times a day  atorvastatin 20 milliGRAM(s) Oral at bedtime  chlorhexidine 0.12% Liquid 15 milliLiter(s) Oral Mucosa every 12 hours  chlorhexidine 2% Cloths 1 Application(s) Topical daily  dextrose 5%. 1000 milliLiter(s) (100 mL/Hr) IV Continuous <Continuous>  dextrose 5%. 1000 milliLiter(s) (50 mL/Hr) IV Continuous <Continuous>  dextrose 50% Injectable 25 Gram(s) IV Push once  dextrose 50% Injectable 12.5 Gram(s) IV Push once  dextrose 50% Injectable 25 Gram(s) IV Push once  epoetin reilly-epbx (RETACRIT) Injectable 01239 Unit(s) IV Push <User Schedule>  ferrous    sulfate Liquid 300 milliGRAM(s) Enteral Tube daily  glucagon  Injectable 1 milliGRAM(s) IntraMuscular once  heparin   Injectable 5000 Unit(s) SubCutaneous every 8 hours  hydrALAZINE 50 milliGRAM(s) Oral three times a day  insulin glargine Injectable (LANTUS) 13 Unit(s) SubCutaneous at bedtime  insulin lispro (ADMELOG) corrective regimen sliding scale   SubCutaneous every 6 hours  insulin lispro Injectable (ADMELOG) 5 Unit(s) SubCutaneous every 6 hours  Nephro-noam 1 Tablet(s) Oral daily  pantoprazole   Suspension 40 milliGRAM(s) Oral before breakfast  potassium chloride   Powder 40 milliEquivalent(s) Oral once  sodium chloride 0.9% for Nebulization 3 milliLiter(s) Nebulizer every 6 hours    MEDICATIONS  (PRN):  dextrose Oral Gel 15 Gram(s) Oral once PRN Blood Glucose LESS THAN 70 milliGRAM(s)/deciliter  sodium chloride 0.9% Bolus. 100 milliLiter(s) IV Bolus every 5 minutes PRN SBP LESS THAN or EQUAL to 80 mmHg      LABS:                        7.4    14.20 )-----------( 266      ( 11 Mar 2025 05:50 )             24.0     03-11    134[L]  |  93[L]  |  34[H]  ----------------------------<  56[L]  3.2[L]   |  27  |  2.44[H]    Ca    9.1      11 Mar 2025 05:50  Phos  2.7     03-11  Mg     2.40     03-11        Urinalysis Basic - ( 11 Mar 2025 05:50 )    Color: x / Appearance: x / SG: x / pH: x  Gluc: 56 mg/dL / Ketone: x  / Bili: x / Urobili: x   Blood: x / Protein: x / Nitrite: x   Leuk Esterase: x / RBC: x / WBC x   Sq Epi: x / Non Sq Epi: x / Bacteria: x         CHIEF COMPLAINT:Patient is a 74y old  Male who presents with a chief complaint of 2/18/25 was in cath lab earlier today with vascular surgery for fistulogram and noted to have a WBC of 18 and sent to ED and admitted (04 Mar 2025 12:22)      INTERVAL EVENTS: No acute overnight events. White count improving. Hypoglycemic episode this morning.    ROS: Seen by bedside during AM rounds     OBJECTIVE:  ICU Vital Signs Last 24 Hrs  T(C): 37.1 (11 Mar 2025 05:27), Max: 37.6 (10 Mar 2025 23:49)  T(F): 98.8 (11 Mar 2025 05:27), Max: 99.6 (10 Mar 2025 23:49)  HR: 83 (11 Mar 2025 07:53) (83 - 109)  BP: 127/57 (11 Mar 2025 05:27) (127/57 - 180/70)  BP(mean): 85 (10 Mar 2025 08:30) (85 - 85)  ABP: --  ABP(mean): --  RR: 16 (11 Mar 2025 05:27) (16 - 22)  SpO2: 100% (11 Mar 2025 07:53) (98% - 100%)    O2 Parameters below as of 11 Mar 2025 05:27  Patient On (Oxygen Delivery Method): ventilator  O2 Flow (L/min): 40        Mode: CPAP with PS, FiO2: 40, PEEP: 6, PS: 10, MAP: 10    03-10 @ 07:01  -  03-11 @ 07:00  --------------------------------------------------------  IN: 2550 mL / OUT: 4500 mL / NET: -1950 mL      CAPILLARY BLOOD GLUCOSE      POCT Blood Glucose.: 101 mg/dL (11 Mar 2025 06:44)      PHYSICAL EXAM:  General: NAD, laying in stretcher   HEENT: NC AT  Neck: Supple.   Respiratory: Right lung field clear to auscultation. Mild end-expiratory crackles appreciated during auscultation of left lung fields. No rhonchi or wheezes.   Cardiovascular: +S1/S2. Regular rate and rhythm.  Abdomen: Soft, non-tender, non-distended. G tube dressing clean, dry and intact with no strike through.   Extremities: 2+ DP pulse b/l. Calves soft and non-tender. cap refill < 2 seconds. No peripheral edema.  Skin: Warm, dry and intact  Neurological: Alert and appears withdrawn (awakens to verbal commands). Follows basic commands (squeezes hands b/l when asked) and tracks with eyes.       Mode: CPAP with PS  FiO2: 40  PEEP: 6  PS: 10  MAP: 10      HOSPITAL MEDICATIONS:  MEDICATIONS  (STANDING):  albuterol    0.083% 2.5 milliGRAM(s) Nebulizer every 6 hours  amLODIPine   Tablet 10 milliGRAM(s) Oral daily  AQUAPHOR (petrolatum Ointment) 1 Application(s) Topical two times a day  atorvastatin 20 milliGRAM(s) Oral at bedtime  chlorhexidine 0.12% Liquid 15 milliLiter(s) Oral Mucosa every 12 hours  chlorhexidine 2% Cloths 1 Application(s) Topical daily  dextrose 5%. 1000 milliLiter(s) (100 mL/Hr) IV Continuous <Continuous>  dextrose 5%. 1000 milliLiter(s) (50 mL/Hr) IV Continuous <Continuous>  dextrose 50% Injectable 25 Gram(s) IV Push once  dextrose 50% Injectable 12.5 Gram(s) IV Push once  dextrose 50% Injectable 25 Gram(s) IV Push once  epoetin reilly-epbx (RETACRIT) Injectable 23577 Unit(s) IV Push <User Schedule>  ferrous    sulfate Liquid 300 milliGRAM(s) Enteral Tube daily  glucagon  Injectable 1 milliGRAM(s) IntraMuscular once  heparin   Injectable 5000 Unit(s) SubCutaneous every 8 hours  hydrALAZINE 50 milliGRAM(s) Oral three times a day  insulin glargine Injectable (LANTUS) 13 Unit(s) SubCutaneous at bedtime  insulin lispro (ADMELOG) corrective regimen sliding scale   SubCutaneous every 6 hours  insulin lispro Injectable (ADMELOG) 5 Unit(s) SubCutaneous every 6 hours  Nephro-noam 1 Tablet(s) Oral daily  pantoprazole   Suspension 40 milliGRAM(s) Oral before breakfast  potassium chloride   Powder 40 milliEquivalent(s) Oral once  sodium chloride 0.9% for Nebulization 3 milliLiter(s) Nebulizer every 6 hours    MEDICATIONS  (PRN):  dextrose Oral Gel 15 Gram(s) Oral once PRN Blood Glucose LESS THAN 70 milliGRAM(s)/deciliter  sodium chloride 0.9% Bolus. 100 milliLiter(s) IV Bolus every 5 minutes PRN SBP LESS THAN or EQUAL to 80 mmHg      LABS:                        7.4    14.20 )-----------( 266      ( 11 Mar 2025 05:50 )             24.0     03-11    134[L]  |  93[L]  |  34[H]  ----------------------------<  56[L]  3.2[L]   |  27  |  2.44[H]    Ca    9.1      11 Mar 2025 05:50  Phos  2.7     03-11  Mg     2.40     03-11        Urinalysis Basic - ( 11 Mar 2025 05:50 )    Color: x / Appearance: x / SG: x / pH: x  Gluc: 56 mg/dL / Ketone: x  / Bili: x / Urobili: x   Blood: x / Protein: x / Nitrite: x   Leuk Esterase: x / RBC: x / WBC x   Sq Epi: x / Non Sq Epi: x / Bacteria: x

## 2025-03-11 NOTE — PROGRESS NOTE ADULT - ASSESSMENT
74-year-old male hx trach/vent - , CVA, COPD, stage renal disease on dialysis 4 times a week ([facility]TR[facility]) history of pressure ulcer.  Patient presents the ED today for elevated white count. nephrology consulted for dialysis needs    ESRD:  from [facility]  On HD MTTsF via an A-V fistula. s/p fistulogram by vascular 2/18  Continue MWF in hospital  Prolonged bleeding post HD -- vascular consulted. no plan for intervention  s/p hd 3/10 uf 2L  consent from wife in dialysis unit.  monitor bmp    Hypertension  BP acceptable.  continue norvasc 10 daily and hydralazine increased to 50 tid 3/2  titrate hydralazine to 100mg TID if needed  max uf as tolerated  monitor BP    Anemia:  s/p 1 unit PRBC 2/23  epo with hd  iron sat >20  monitor Hgb  Transfuse for Hg < 7.    leukocytosis  sepsis work up per team  On Zosyn.    hyponatremia  in setting of esrd and hyperglycemia  optimize dm control  hd per schedule with UF  monitor Na    Hypokalemia  Dialyze w/ 4K bath   supplement [id number] [id number].  Monitor K.    Hypermagnesemia  HD per schedule.  Monitor Mg.    CKD - MBD:  PTH 25 - low for CKD stage.  Received PhosNaK x1 on 3/8.  PO4 improved.  Monitor PO4 and Ca daily.

## 2025-03-11 NOTE — PROGRESS NOTE ADULT - SUBJECTIVE AND OBJECTIVE BOX
[facility]  AUGUST Carbajal Y. Patel, S. Shah, G. Casimir  [phone]  ([phone] - weekdays after 5pm and weekends)    Name: JIMMY BLAND  Age/Gender: 74y Male  MRN: [id number]    Interval History:  Notes reviewed.   No concerning overnight events.  Afebrile.     Allergies: No Known Allergies      Objective:  Vitals:   T(F): 98.4 (03-11-25 @ 08:00), Max: 99.6 (03-10-25 @ 23:49)  HR: 88 (03-11-25 @ 11:42) (83 - 109)  BP: 147/99 (03-11-25 @ 08:00) (127/57 - 180/70)  RR: 19 (03-11-25 @ 08:00) (16 - 22)  SpO2: 100% (03-11-25 @ 11:42) (100% - 100%)  Physical Examination:  General: frail appearing  HEENT: anicteric, tracheostomy, on vent  Cardio: S1, S2, normal rate  Resp: decreased breath sounds  Abd: Soft. Nondistended. PEG  Ext: no LE edema  Skin: warm, dry    Laboratory Studies:  CBC:                       7.4    14.20 )-----------( 266      ( 11 Mar 2025 05:50 )             24.0     WBC Trend:  14.20 03-11-25 @ 05:50  23.01 03-10-25 @ 17:00  21.01 03-09-25 @ 05:20  18.15 03-08-25 @ 05:30  15.35 03-07-25 @ 22:30  11.89 03-07-25 @ 07:10  13.03 03-06-25 @ 05:45  16.30 03-05-25 @ 11:34    CMP: 03-11    134[L]  |  93[L]  |  34[H]  ----------------------------<  56[L]  3.2[L]   |  27  |  2.44[H]    Ca    9.1      11 Mar 2025 05:50  Phos  2.7     03-11  Mg     2.40     03-11            Urinalysis Basic - ( 11 Mar 2025 05:50 )    Color: x / Appearance: x / SG: x / pH: x  Gluc: 56 mg/dL / Ketone: x  / Bili: x / Urobili: x   Blood: x / Protein: x / Nitrite: x   Leuk Esterase: x / RBC: x / WBC x   Sq Epi: x / Non Sq Epi: x / Bacteria: x      Microbiology: reviewed     Culture - Blood (collected 03-09-25 @ 05:20)  Source: Blood Blood-Peripheral  Preliminary Report (03-11-25 @ 09:01):    No growth at 48 Hours    Culture - Blood (collected 03-09-25 @ 05:05)  Source: Blood Blood-Venous  Preliminary Report (03-11-25 @ 09:01):    No growth at 48 Hours    Culture - Blood (collected 03-03-25 @ 16:27)  Source: Blood Blood-Peripheral  Final Report (03-08-25 @ 22:01):    No growth at 5 days    Culture - Blood (collected 03-03-25 @ 16:23)  Source: Blood Blood-Venous  Final Report (03-08-25 @ 22:01):    No growth at 5 days    Culture - Sputum (collected 03-03-25 @ 14:10)  Source: Trach Asp Tracheal Aspirate  Gram Stain (03-04-25 @ 04:28):    Few polymorphonuclear leukocytes per low power field    No Squamous epithelial cells per low power field    Moderate Gram Negative Rods seen per oil power field  Final Report (03-05-25 @ 16:12):    Numerous Pseudomonas aeruginosa (Carbapenem Resistant)    Commensal dominick consistent with body site  Organism: Pseudomonas aeruginosa (Carbapenem Resistant) (03-05-25 @ 16:12)  Organism: Pseudomonas aeruginosa (Carbapenem Resistant) (03-05-25 @ 16:12)      Method Type: CarbaR      -  Resistance Gene to Carbapenem: Nondet  Organism: Pseudomonas aeruginosa (Carbapenem Resistant) (03-05-25 @ 16:12)      Method Type: VIDA      -  Aztreonam: S <=4      -  Cefepime: S <=2      -  Ceftazidime: S 4      -  Ceftazidime/Avibactam: S <=4      -  Ceftolozane/tazobactam: S <=2      -  Ciprofloxacin: S <=0.25      -  Imipenem: R 8      -  Levofloxacin: S <=0.5      -  Meropenem: R 8      -  Piperacillin/Tazobactam: S <=8    Culture - Fungal, Bronchial (collected 02-26-25 @ 16:43)  Source: Bronchial Bronchial Lavage  Preliminary Report (03-05-25 @ 23:02):    No fungus isolated at 1 week.    Culture - Bronchial (collected 02-26-25 @ 16:43)  Source: Bronchial Bronchial Lavage  Gram Stain (02-26-25 @ 23:04):    Few polymorphonuclear leukocytes per low power field    No squamous epithelial cells    Few Gram Negative Rods per oil power field  Final Report (02-28-25 @ 17:24):    Few Pseudomonas aeruginosa    Commensal dominick consistent with body site  Organism: Pseudomonas aeruginosa (02-28-25 @ 17:24)  Organism: Pseudomonas aeruginosa (02-28-25 @ 17:24)      Method Type: VIDA      -  Aztreonam: S <=4      -  Cefepime: S <=2      -  Ceftazidime: S <=1      -  Ciprofloxacin: S <=0.25      -  Imipenem: S 2      -  Levofloxacin: S <=0.5      -  Meropenem: S <=1      -  Piperacillin/Tazobactam: S <=8        Radiology: reviewed     Medications:  albuterol    0.083% 2.5 milliGRAM(s) Nebulizer every 6 hours  amLODIPine   Tablet 10 milliGRAM(s) Oral daily  AQUAPHOR (petrolatum Ointment) 1 Application(s) Topical two times a day  atorvastatin 20 milliGRAM(s) Oral at bedtime  chlorhexidine 0.12% Liquid 15 milliLiter(s) Oral Mucosa every 12 hours  chlorhexidine 2% Cloths 1 Application(s) Topical daily  dextrose 5%. 1000 milliLiter(s) IV Continuous <Continuous>  dextrose 5%. 1000 milliLiter(s) IV Continuous <Continuous>  dextrose 50% Injectable 25 Gram(s) IV Push once  dextrose 50% Injectable 12.5 Gram(s) IV Push once  dextrose 50% Injectable 25 Gram(s) IV Push once  dextrose Oral Gel 15 Gram(s) Oral once PRN  epoetin reilly-epbx (RETACRIT) Injectable 76197 Unit(s) IV Push <User Schedule>  ferrous    sulfate Liquid 300 milliGRAM(s) Enteral Tube daily  glucagon  Injectable 1 milliGRAM(s) IntraMuscular once  heparin   Injectable 5000 Unit(s) SubCutaneous every 8 hours  hydrALAZINE 50 milliGRAM(s) Oral three times a day  insulin glargine Injectable (LANTUS) 5 Unit(s) SubCutaneous at bedtime  insulin lispro (ADMELOG) corrective regimen sliding scale   SubCutaneous every 6 hours  insulin lispro Injectable (ADMELOG) 4 Unit(s) SubCutaneous every 6 hours  Nephro-noam 1 Tablet(s) Oral daily  pantoprazole   Suspension 40 milliGRAM(s) Oral before breakfast  sodium chloride 0.9% Bolus. 100 milliLiter(s) IV Bolus every 5 minutes PRN  sodium chloride 0.9% for Nebulization 3 milliLiter(s) Nebulizer every 6 hours    Antimicrobials:

## 2025-03-12 LAB
ANION GAP SERPL CALC-SCNC: 16 MMOL/L — HIGH (ref 7–14)
BASOPHILS # BLD AUTO: 0.04 K/UL — SIGNIFICANT CHANGE UP (ref 0–0.2)
BASOPHILS NFR BLD AUTO: 0.2 % — SIGNIFICANT CHANGE UP (ref 0–2)
BLD GP AB SCN SERPL QL: NEGATIVE — SIGNIFICANT CHANGE UP
BUN SERPL-MCNC: 48 MG/DL — HIGH (ref 7–23)
CALCIUM SERPL-MCNC: 9.6 MG/DL — SIGNIFICANT CHANGE UP (ref 8.4–10.5)
CHLORIDE SERPL-SCNC: 89 MMOL/L — LOW (ref 98–107)
CO2 SERPL-SCNC: 26 MMOL/L — SIGNIFICANT CHANGE UP (ref 22–31)
CREAT SERPL-MCNC: 3.35 MG/DL — HIGH (ref 0.5–1.3)
EGFR: 19 ML/MIN/1.73M2 — LOW
EGFR: 19 ML/MIN/1.73M2 — LOW
EOSINOPHIL # BLD AUTO: 0.06 K/UL — SIGNIFICANT CHANGE UP (ref 0–0.5)
EOSINOPHIL NFR BLD AUTO: 0.3 % — SIGNIFICANT CHANGE UP (ref 0–6)
FLUAV AG NPH QL: SIGNIFICANT CHANGE UP
FLUBV AG NPH QL: SIGNIFICANT CHANGE UP
GLUCOSE BLDC GLUCOMTR-MCNC: 173 MG/DL — HIGH (ref 70–99)
GLUCOSE BLDC GLUCOMTR-MCNC: 176 MG/DL — HIGH (ref 70–99)
GLUCOSE BLDC GLUCOMTR-MCNC: 190 MG/DL — HIGH (ref 70–99)
GLUCOSE BLDC GLUCOMTR-MCNC: 246 MG/DL — HIGH (ref 70–99)
GLUCOSE SERPL-MCNC: 180 MG/DL — HIGH (ref 70–99)
HCT VFR BLD CALC: 24.4 % — LOW (ref 39–50)
HGB BLD-MCNC: 7.5 G/DL — LOW (ref 13–17)
IANC: 16.63 K/UL — HIGH (ref 1.8–7.4)
LYMPHOCYTES # BLD AUTO: 0.89 K/UL — LOW (ref 1–3.3)
LYMPHOCYTES # BLD AUTO: 4.8 % — LOW (ref 13–44)
MAGNESIUM SERPL-MCNC: 2.8 MG/DL — HIGH (ref 1.6–2.6)
MCHC RBC-ENTMCNC: 23.5 PG — LOW (ref 27–34)
MCV RBC AUTO: 76.5 FL — LOW (ref 80–100)
MONOCYTES # BLD AUTO: 0.99 K/UL — HIGH (ref 0–0.9)
NEUTROPHILS # BLD AUTO: 16.63 K/UL — HIGH (ref 1.8–7.4)
NEUTROPHILS NFR BLD AUTO: 88.8 % — HIGH (ref 43–77)
NRBC # BLD AUTO: 0.03 K/UL — HIGH (ref 0–0)
NRBC # FLD: 0.03 K/UL — HIGH (ref 0–0)
NRBC BLD AUTO-RTO: 0 /100 WBCS — SIGNIFICANT CHANGE UP (ref 0–0)
PHOSPHATE SERPL-MCNC: 3.6 MG/DL — SIGNIFICANT CHANGE UP (ref 2.5–4.5)
PLATELET # BLD AUTO: 283 K/UL — SIGNIFICANT CHANGE UP (ref 150–400)
POTASSIUM SERPL-MCNC: 3.9 MMOL/L — SIGNIFICANT CHANGE UP (ref 3.5–5.3)
POTASSIUM SERPL-SCNC: 3.9 MMOL/L — SIGNIFICANT CHANGE UP (ref 3.5–5.3)
RBC # BLD: 3.19 M/UL — LOW (ref 4.2–5.8)
RBC # FLD: 17.5 % — HIGH (ref 10.3–14.5)
RH IG SCN BLD-IMP: POSITIVE — SIGNIFICANT CHANGE UP
RSV RNA NPH QL NAA+NON-PROBE: SIGNIFICANT CHANGE UP
SARS-COV-2 RNA SPEC QL NAA+PROBE: SIGNIFICANT CHANGE UP
SODIUM SERPL-SCNC: 131 MMOL/L — LOW (ref 135–145)
WBC # BLD: 18.73 K/UL — HIGH (ref 3.8–10.5)
WBC # FLD AUTO: 18.73 K/UL — HIGH (ref 3.8–10.5)

## 2025-03-12 PROCEDURE — 99232 SBSQ HOSP IP/OBS MODERATE 35: CPT

## 2025-03-12 PROCEDURE — 74177 CT ABD & PELVIS W/CONTRAST: CPT | Mod: 26

## 2025-03-12 PROCEDURE — 71045 X-RAY EXAM CHEST 1 VIEW: CPT | Mod: 26

## 2025-03-12 PROCEDURE — 71260 CT THORAX DX C+: CPT | Mod: 26

## 2025-03-12 PROCEDURE — 99233 SBSQ HOSP IP/OBS HIGH 50: CPT | Mod: FS

## 2025-03-12 RX ORDER — PIPERACILLIN-TAZO-DEXTROSE,ISO 3.375G/5
3.38 IV SOLUTION, PIGGYBACK PREMIX FROZEN(ML) INTRAVENOUS EVERY 12 HOURS
Refills: 0 | Status: DISCONTINUED | OUTPATIENT
Start: 2025-03-12 | End: 2025-03-17

## 2025-03-12 RX ORDER — INSULIN LISPRO 100 U/ML
5 INJECTION, SOLUTION INTRAVENOUS; SUBCUTANEOUS EVERY 6 HOURS
Refills: 0 | Status: DISCONTINUED | OUTPATIENT
Start: 2025-03-12 | End: 2025-03-13

## 2025-03-12 RX ORDER — ACETAMINOPHEN 500 MG/5ML
650 LIQUID (ML) ORAL ONCE
Refills: 0 | Status: COMPLETED | OUTPATIENT
Start: 2025-03-12 | End: 2025-03-12

## 2025-03-12 RX ORDER — INSULIN GLARGINE-YFGN 100 [IU]/ML
10 INJECTION, SOLUTION SUBCUTANEOUS AT BEDTIME
Refills: 0 | Status: DISCONTINUED | OUTPATIENT
Start: 2025-03-12 | End: 2025-03-13

## 2025-03-12 RX ADMIN — Medication 50 MILLIGRAM(S): at 21:41

## 2025-03-12 RX ADMIN — Medication 1 APPLICATION(S): at 12:03

## 2025-03-12 RX ADMIN — AMLODIPINE BESYLATE 10 MILLIGRAM(S): 10 TABLET ORAL at 05:00

## 2025-03-12 RX ADMIN — Medication 300 MILLIGRAM(S): at 11:51

## 2025-03-12 RX ADMIN — Medication 650 MILLIGRAM(S): at 00:44

## 2025-03-12 RX ADMIN — HEPARIN SODIUM 5000 UNIT(S): 1000 INJECTION INTRAVENOUS; SUBCUTANEOUS at 17:50

## 2025-03-12 RX ADMIN — Medication 50 MILLIGRAM(S): at 15:10

## 2025-03-12 RX ADMIN — INSULIN LISPRO 4 UNIT(S): 100 INJECTION, SOLUTION INTRAVENOUS; SUBCUTANEOUS at 06:05

## 2025-03-12 RX ADMIN — Medication 50 MILLIGRAM(S): at 05:00

## 2025-03-12 RX ADMIN — Medication 1 TABLET(S): at 11:51

## 2025-03-12 RX ADMIN — Medication 2.5 MILLIGRAM(S): at 15:21

## 2025-03-12 RX ADMIN — WHITE PETROLATUM 1 APPLICATION(S): 1 OINTMENT TOPICAL at 05:01

## 2025-03-12 RX ADMIN — Medication 2.5 MILLIGRAM(S): at 02:58

## 2025-03-12 RX ADMIN — HEPARIN SODIUM 5000 UNIT(S): 1000 INJECTION INTRAVENOUS; SUBCUTANEOUS at 05:01

## 2025-03-12 RX ADMIN — INSULIN LISPRO 1: 100 INJECTION, SOLUTION INTRAVENOUS; SUBCUTANEOUS at 11:59

## 2025-03-12 RX ADMIN — INSULIN LISPRO 1: 100 INJECTION, SOLUTION INTRAVENOUS; SUBCUTANEOUS at 17:48

## 2025-03-12 RX ADMIN — Medication 15 MILLILITER(S): at 17:47

## 2025-03-12 RX ADMIN — Medication 2.5 MILLIGRAM(S): at 20:42

## 2025-03-12 RX ADMIN — Medication 650 MILLIGRAM(S): at 01:14

## 2025-03-12 RX ADMIN — INSULIN LISPRO 4 UNIT(S): 100 INJECTION, SOLUTION INTRAVENOUS; SUBCUTANEOUS at 12:00

## 2025-03-12 RX ADMIN — INSULIN LISPRO 5 UNIT(S): 100 INJECTION, SOLUTION INTRAVENOUS; SUBCUTANEOUS at 23:47

## 2025-03-12 RX ADMIN — INSULIN GLARGINE-YFGN 10 UNIT(S): 100 INJECTION, SOLUTION SUBCUTANEOUS at 23:46

## 2025-03-12 RX ADMIN — Medication 25 GRAM(S): at 17:46

## 2025-03-12 RX ADMIN — EPOETIN ALFA 10000 UNIT(S): 10000 SOLUTION INTRAVENOUS; SUBCUTANEOUS at 13:53

## 2025-03-12 RX ADMIN — ATORVASTATIN CALCIUM 20 MILLIGRAM(S): 80 TABLET, FILM COATED ORAL at 21:41

## 2025-03-12 RX ADMIN — WHITE PETROLATUM 1 APPLICATION(S): 1 OINTMENT TOPICAL at 17:47

## 2025-03-12 RX ADMIN — INSULIN LISPRO 2: 100 INJECTION, SOLUTION INTRAVENOUS; SUBCUTANEOUS at 06:04

## 2025-03-12 RX ADMIN — Medication 2.5 MILLIGRAM(S): at 08:26

## 2025-03-12 RX ADMIN — Medication 40 MILLIGRAM(S): at 05:00

## 2025-03-12 RX ADMIN — INSULIN LISPRO 1: 100 INJECTION, SOLUTION INTRAVENOUS; SUBCUTANEOUS at 23:47

## 2025-03-12 RX ADMIN — INSULIN LISPRO 5 UNIT(S): 100 INJECTION, SOLUTION INTRAVENOUS; SUBCUTANEOUS at 17:48

## 2025-03-12 RX ADMIN — Medication 15 MILLILITER(S): at 05:01

## 2025-03-12 NOTE — PROGRESS NOTE ADULT - SUBJECTIVE AND OBJECTIVE BOX
CHIEF COMPLAINT:Patient is a 74y old  Male who presents with a chief complaint of 2/18/25 was in cath lab earlier today with vascular surgery for fistulogram and noted to have a WBC of 18 and sent to ED and admitted (04 Mar 2025 12:22)      INTERVAL EVENTS:     ROS: Seen by bedside during AM rounds     OBJECTIVE:  ICU Vital Signs Last 24 Hrs  T(C): 36.8 (12 Mar 2025 04:00), Max: 37.9 (12 Mar 2025 00:10)  T(F): 98.2 (12 Mar 2025 04:00), Max: 100.3 (12 Mar 2025 00:10)  HR: 90 (12 Mar 2025 04:00) (83 - 103)  BP: 149/65 (12 Mar 2025 04:00) (94/68 - 168/59)  BP(mean): 89 (12 Mar 2025 04:00) (77 - 89)  ABP: --  ABP(mean): --  RR: 16 (12 Mar 2025 04:00) (16 - 23)  SpO2: 100% (12 Mar 2025 04:00) (97% - 100%)    O2 Parameters below as of 12 Mar 2025 04:00  Patient On (Oxygen Delivery Method): ventilator    O2 Concentration (%): 40      Mode: AC/ CMV (Assist Control/ Continuous Mandatory Ventilation), RR (machine): 15, TV (machine): 400, FiO2: 40, PEEP: 6, ITime: 0.75, MAP: 12, PIP: 29    03-11 @ 07:01  -  03-12 @ 07:00  --------------------------------------------------------  IN: 1990 mL / OUT: 0 mL / NET: 1990 mL      CAPILLARY BLOOD GLUCOSE      POCT Blood Glucose.: 246 mg/dL (12 Mar 2025 05:56)      PHYSICAL EXAM:  General:   HEENT:   Lymph Nodes:  Neck:   Respiratory:   Cardiovascular:   Abdomen:   Extremities:   Skin:   Neurological:  Psychiatry:    Mode: AC/ CMV (Assist Control/ Continuous Mandatory Ventilation)  RR (machine): 15  TV (machine): 400  FiO2: 40  PEEP: 6  ITime: 0.75  MAP: 12  PIP: 29      HOSPITAL MEDICATIONS:  MEDICATIONS  (STANDING):  albuterol    0.083% 2.5 milliGRAM(s) Nebulizer every 6 hours  amLODIPine   Tablet 10 milliGRAM(s) Oral daily  AQUAPHOR (petrolatum Ointment) 1 Application(s) Topical two times a day  atorvastatin 20 milliGRAM(s) Oral at bedtime  chlorhexidine 0.12% Liquid 15 milliLiter(s) Oral Mucosa every 12 hours  chlorhexidine 2% Cloths 1 Application(s) Topical daily  dextrose 5%. 1000 milliLiter(s) (100 mL/Hr) IV Continuous <Continuous>  dextrose 5%. 1000 milliLiter(s) (50 mL/Hr) IV Continuous <Continuous>  dextrose 50% Injectable 25 Gram(s) IV Push once  dextrose 50% Injectable 12.5 Gram(s) IV Push once  dextrose 50% Injectable 25 Gram(s) IV Push once  epoetin reilly-epbx (RETACRIT) Injectable 22762 Unit(s) IV Push <User Schedule>  ferrous    sulfate Liquid 300 milliGRAM(s) Enteral Tube daily  glucagon  Injectable 1 milliGRAM(s) IntraMuscular once  heparin   Injectable 5000 Unit(s) SubCutaneous every 12 hours  hydrALAZINE 50 milliGRAM(s) Oral three times a day  insulin glargine Injectable (LANTUS) 8 Unit(s) SubCutaneous at bedtime  insulin lispro (ADMELOG) corrective regimen sliding scale   SubCutaneous every 6 hours  insulin lispro Injectable (ADMELOG) 4 Unit(s) SubCutaneous every 6 hours  Nephro-noam 1 Tablet(s) Oral daily  pantoprazole   Suspension 40 milliGRAM(s) Oral before breakfast  sodium chloride 0.9% for Nebulization 3 milliLiter(s) Nebulizer every 6 hours    MEDICATIONS  (PRN):  dextrose Oral Gel 15 Gram(s) Oral once PRN Blood Glucose LESS THAN 70 milliGRAM(s)/deciliter  sodium chloride 0.9% Bolus. 100 milliLiter(s) IV Bolus every 5 minutes PRN SBP LESS THAN or EQUAL to 80 mmHg      LABS:                        7.5    18.73 )-----------( 283      ( 12 Mar 2025 04:53 )             24.4     03-12    131[L]  |  89[L]  |  48[H]  ----------------------------<  180[H]  3.9   |  26  |  3.35[H]    Ca    9.6      12 Mar 2025 04:53  Phos  3.6     03-12  Mg     2.80     03-12        Urinalysis Basic - ( 12 Mar 2025 04:53 )    Color: x / Appearance: x / SG: x / pH: x  Gluc: 180 mg/dL / Ketone: x  / Bili: x / Urobili: x   Blood: x / Protein: x / Nitrite: x   Leuk Esterase: x / RBC: x / WBC x   Sq Epi: x / Non Sq Epi: x / Bacteria: x         CHIEF COMPLAINT:Patient is a 74y old  Male who presents with a chief complaint of 2/18/25 was in cath lab earlier today with vascular surgery for fistulogram and noted to have a WBC of 18 and sent to ED and admitted (04 Mar 2025 12:22)      INTERVAL EVENTS: Low grade fever (100.3F) overnight with elevated white count. Discussed with ID - recommend CT chest and abdomen/pelvis. Restarted Zosyn empirically.    ROS: Seen by bedside during AM rounds     OBJECTIVE:  ICU Vital Signs Last 24 Hrs  T(C): 36.8 (12 Mar 2025 04:00), Max: 37.9 (12 Mar 2025 00:10)  T(F): 98.2 (12 Mar 2025 04:00), Max: 100.3 (12 Mar 2025 00:10)  HR: 90 (12 Mar 2025 04:00) (83 - 103)  BP: 149/65 (12 Mar 2025 04:00) (94/68 - 168/59)  BP(mean): 89 (12 Mar 2025 04:00) (77 - 89)  ABP: --  ABP(mean): --  RR: 16 (12 Mar 2025 04:00) (16 - 23)  SpO2: 100% (12 Mar 2025 04:00) (97% - 100%)    O2 Parameters below as of 12 Mar 2025 04:00  Patient On (Oxygen Delivery Method): ventilator    O2 Concentration (%): 40      Mode: AC/ CMV (Assist Control/ Continuous Mandatory Ventilation), RR (machine): 15, TV (machine): 400, FiO2: 40, PEEP: 6, ITime: 0.75, MAP: 12, PIP: 29    03-11 @ 07:01  -  03-12 @ 07:00  --------------------------------------------------------  IN: 1990 mL / OUT: 0 mL / NET: 1990 mL      CAPILLARY BLOOD GLUCOSE      POCT Blood Glucose.: 246 mg/dL (12 Mar 2025 05:56)      PHYSICAL EXAM:  General: NAD, lying in stretcher  HEENT: Pupils reactive to light.   Neck: Supple. + Trach collar, c/d/i  Respiratory: Diffuse, mild crackles, more notable in right lung fields. Breath sounds are course, but diminished on left side. No wheezing or rhonchi.   Cardiovascular: +S1/S2 regular rate and rhythm.  Abdomen: Soft and non-distended. G tube dressing c/d/i  Extremities: +2 DP and radial pulses b/l. Calves soft and non-tender. No peripheral edema.   Neurological: Appears somnolent, tracks with eyes. Follows commands (squeezes hands b/l when asked) and grimaces to painful stimuli.       Mode: AC/ CMV (Assist Control/ Continuous Mandatory Ventilation)  RR (machine): 15  TV (machine): 400  FiO2: 40  PEEP: 6  ITime: 0.75  MAP: 12  PIP: 29      HOSPITAL MEDICATIONS:  MEDICATIONS  (STANDING):  albuterol    0.083% 2.5 milliGRAM(s) Nebulizer every 6 hours  amLODIPine   Tablet 10 milliGRAM(s) Oral daily  AQUAPHOR (petrolatum Ointment) 1 Application(s) Topical two times a day  atorvastatin 20 milliGRAM(s) Oral at bedtime  chlorhexidine 0.12% Liquid 15 milliLiter(s) Oral Mucosa every 12 hours  chlorhexidine 2% Cloths 1 Application(s) Topical daily  dextrose 5%. 1000 milliLiter(s) (100 mL/Hr) IV Continuous <Continuous>  dextrose 5%. 1000 milliLiter(s) (50 mL/Hr) IV Continuous <Continuous>  dextrose 50% Injectable 25 Gram(s) IV Push once  dextrose 50% Injectable 12.5 Gram(s) IV Push once  dextrose 50% Injectable 25 Gram(s) IV Push once  epoetin reilly-epbx (RETACRIT) Injectable 11031 Unit(s) IV Push <User Schedule>  ferrous    sulfate Liquid 300 milliGRAM(s) Enteral Tube daily  glucagon  Injectable 1 milliGRAM(s) IntraMuscular once  heparin   Injectable 5000 Unit(s) SubCutaneous every 12 hours  hydrALAZINE 50 milliGRAM(s) Oral three times a day  insulin glargine Injectable (LANTUS) 8 Unit(s) SubCutaneous at bedtime  insulin lispro (ADMELOG) corrective regimen sliding scale   SubCutaneous every 6 hours  insulin lispro Injectable (ADMELOG) 4 Unit(s) SubCutaneous every 6 hours  Nephro-noam 1 Tablet(s) Oral daily  pantoprazole   Suspension 40 milliGRAM(s) Oral before breakfast  sodium chloride 0.9% for Nebulization 3 milliLiter(s) Nebulizer every 6 hours    MEDICATIONS  (PRN):  dextrose Oral Gel 15 Gram(s) Oral once PRN Blood Glucose LESS THAN 70 milliGRAM(s)/deciliter  sodium chloride 0.9% Bolus. 100 milliLiter(s) IV Bolus every 5 minutes PRN SBP LESS THAN or EQUAL to 80 mmHg      LABS:                        7.5    18.73 )-----------( 283      ( 12 Mar 2025 04:53 )             24.4     03-12    131[L]  |  89[L]  |  48[H]  ----------------------------<  180[H]  3.9   |  26  |  3.35[H]    Ca    9.6      12 Mar 2025 04:53  Phos  3.6     03-12  Mg     2.80     03-12        Urinalysis Basic - ( 12 Mar 2025 04:53 )    Color: x / Appearance: x / SG: x / pH: x  Gluc: 180 mg/dL / Ketone: x  / Bili: x / Urobili: x   Blood: x / Protein: x / Nitrite: x   Leuk Esterase: x / RBC: x / WBC x   Sq Epi: x / Non Sq Epi: x / Bacteria: x

## 2025-03-12 NOTE — PROGRESS NOTE ADULT - NS ATTEND AMEND GEN_ALL_CORE FT
74M w/ ESRD on HD via fistula, HTN, DMII, and recent prolonged hospitalization (4-6/2024) for baclofen toxicity and acute ischemic CVA of the left talya/cerebellum c/b acute hypoxemic and hypercapnic respiratory failure s/p ETT intubation/MV with failure to wean from ventilator s/p tracheostomy with hospital course further complicated by aspiration and B cereus bacteremia and RLE DVT followed by citrobacter PNA, GIB i/s/o AOCD, and fistula stenosis s/p fistulogram 6/4 ultimately transferred to acute rehab, decannulated, and discharged home until 10/2024 when pt returned to OSH with aspiration PNA c/b hypoxemic respiratory failure requiring ETT intubation/MV followed by tracheostomy placement and d/c to LTCF ([facility]) 11/2024 now presenting to [facility] on 2/18/25 for RUE fistulogram/angioplasty with vascular sx but with hospital course c/b concern fo recurrent PNA with uncontrolled hyperglycemia admitted to RCU for further medical management.    Rising leukocytosis and low grade temperature.  Appreciate ID follow up.  Will follow up pan CT.   Resume Zosyn.

## 2025-03-12 NOTE — PROGRESS NOTE ADULT - ASSESSMENT
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 Patent recently admitted in 6/2024 for left pontine and cerebellar CVA requiring tracheostomy. While at [facility] patient decannulated and passed SS with advanced diet to easy to chew with thin liquids, but complicated COVID requiring transfer to [facility] in 7/2024 and then ultimately discharged home. Per wife patient was doing well at home until 10/2024 when he was found with RLL with concern for aspiration requiring intubation, then tracheostomy, and ultimately discharged to NH with vent dependence in 11/2024. Patient now presented for RUE fistulogram and angioplasty with vascular, however course complicated by leukocytosis with concern for recurrent trachitis vs PNA and UTI, AOCD and hyperglycemia. Admitted to RCU for further management. Found with  PSA trachitis/ PNA and enterococcus faecium UTI. Course complicated by RUE brachial DVT and hypoxia with HD with concern for volume down vs PE? Volume removal held and hypoxia improved. AC given empirically and CTA CHEST with no PE. Case discussed with vascular and since brachial DVT appears old no need for chronic AC . Course further complicated by PSA LLL PNA and continued on zosyn.     NEUROLOGY  # Poor mentation second to metabolic encephalopathy vs psychosomatic/ depression   - Hx of L cerebellar and pontene embolic CVA x 2 with residual R hemiplegia   - AOx0, bedbound, and nonverbal at baseline per report, however per exam patient able to open eyes, able to follow commands on left (LUE>LLE) with SEVERE weakness, however noted with R hemiplegia per baseline  - Nods yes and no occasionally however keeps eyes closed likely psychosomatic vs metabolic encephalopathy with infections?   - Last CTH in 10/2024 with no acute findings   - Hold RPT CTH for now   - Monitor mentation     CARDIOVASCULAR  # Hx of HTN and HLD  - Continue on hydral 50 (increased 3/2)   - Continue on norvasc 10   - Monitor BP     # Incidental Pericardial effusion   - Incidental small pericardial effusion seen adjacent to right ventricle, however IVC appears flat and LE without edema with low concern for RV failure.   - Prior TTE reviewed from 4/2024 with normal RVLVSF with no pericardial effusion noted at the time.   - TTE 2/23 with EF 65, normal LVRVSF, mild LAD, normal RA, mild pulmonary hypertension, and small pericardial effusion noted adjacent to the anterior right ventricle with no echocardiographic evidence of tamponade  - Monitor hemodynamics     RESPIRATORY  # Respiratory failure second to PNA vs volume down vs PE?   - Hx of COPD with chronic respiratory failure with tracheostomy and vent dependence  - Admitted for angioplasty of RUE AVF, however course complicated by leukocytosis with concern for recurrent trachitis/ PNA.   - Course complicated by hypoxia during HD likely volume down vs migration of RUE brachial DVT with PE?   - Held volume removal, bolus given, and hypoxia improved.   - CTA CHEST without PE  - Course complicated by LLL mucous plug and IPV started with improvement   - Continue on albuterol, NS 0.9 and IPV  - Continue on vent 15/400/6/40    HEENT   # Hemoptysis   - Wife reported hemoptysis while at nursing home 2 weeks prior to admission   - No hemoptysis noted on admission   - Hemoptysis returned in house   - CTA CHEST 2/24 without PE  - Bronch 2/26 with no evidence of bleeding   - Bleeding resolved     GI  # Dysphagia with PEG   - Passed SS with advanced diet to easy to chew with thin liquids in 7/2024, however since recurrent aspiration in 10/2024 now with PEG/ vent dependence   - Continue on PEG TF and changed to bolus feeds   - Monitor tolerance     RENAL  # ESRD on HD   # Dehydration   - Resumed on HD with no flow issue from AVF   - Course complicated by hypoxia with concern for volume down given small IVC and elevated lactate on 2/21  - Volume removal held, lactate improved, and hypoxia improved.   - Continue on HD MWF in house per renal   - Return to HD MTRF outpatient   - Monitor renal function, electrolytes and UOP     # AVF trouble   # AVF oozing   - Presented for RUE fistulogram and angioplasty with vascular on 2/18   - Oozing from AVF post HD, surgicell placed, and AC held with improvement.  - Discussed with vascular and no need for AC given DVT as below is chronic   - RPT DOPPLER with again noted DVT in right stented brachial vein, however als noted with significantly elevated flow velocities with turbulence at the anastomotic site. Wall thickening noted at the proximal segment of the outflow vein, which is otherwise patent. Distal segment of the outflow vein appears patent without thrombosis evidence.   - Discussed with vascular and given patent AVF with no HD trouble no need for fistulogram at this time.     # Hyponatremia likely volume down   - Concern for volume issues, however overall appears volume down with serum osmo 307  - Sodium 128 and improved with HD/ bolus during HD.   - Trend sodium with HD for now     # Elevated lactate   - Lactate elevated likely second to volume down?   - Post bolus lactate trending down from 3.3 to 2.7 to 1.3    INFECTIOUS DISEASE  # Recurrent LLL PNA   - CXR with LLL mucous plug   - POCUS with LLL consolidation vs atelectasis   - Recent BAL with PSA as below   - FULL RVP negative  - SCx with PSA  - Fevers returned and zosyn (3/3 -3/10) started empirically   - WBC rising but appears nontoxic and afebrile; CXR, secretions and BCx negative.  - Monitor off ABX, last dose of Zosyn yesterday    # Hemoptysis   - Hemoptysis as above   - BAL with PSA as prior   - No fever and monitor off ABX     # Trachitis vs PNA/ UTI   - Hx of steno and PSA in sputum   - Flu/ COVID negative   - MRSA negative   - UCx with enterococcus   - SCx with  PSA   - Found with leukocytosis and started on cefepime and christiano (2/19) and vanco on HD days (2/21).   - No further steno seen in sputum and continue on cefepime (2/19 - 2/25) and vanco on HD days (2/21, 2/22 - 2/25)   - WBC increased likely reactive to hypoxic event and now improving  - ID following     # PPX   - MRSA PCR negative  - Candida auris PCR POSITIVE  - Continue on isolation precautions per IC    HEME  # AOCD   - Presented for angioplasty and found with anemia to 6.7 s/p 1U PRBC 2/19 and 1/2 PRBC 2/23   - Anemia panel with concern for AOCD   - Continue on EPO QIW and Fe   - Monitor HH     # Heparin resistance?   - PTT persistently low despite increasing heparin GTT   - Resume on heparin GTT and anti Xa level supra therapeutic on but PTT remained low  - Patient ultimately with concern for heparin resistance    VASCULAR  # RUE DVT   - RUE edema noted with age-indeterminate, non-occlusive deep vein thrombosis noted in the right brachial vein.   - Case discussed with vascular who recommends full AC   - CTH from prior with encephalomalcia, however no hx of intracranial bleed   - Prior UGIB while on AC, however discharged on eliquis in 7/2024 and completed 6 month course for RLE DVT   - Continue on heparin GTT, however held for hemoptysis and resistance   - Continued on argatroban with good tolerance and then changed to eliquis   - Case discussed vascular and DVT likely chronic and no need for eliquis     ENDOCRINE  # DM2 A1C 14.0 (1/2024) > 6.4 (2/2025)  - Presented with hyperglycemia and continued on lantus and ISS   - Increased lantus, however FS continues to trend in 200s and changed to NPH with improvement.  - TF interrupted for bronchoscopy and adjustment to bolus feeds and FS dropped.   - NPH stopped and FS improved, however trended back up with continuation of tube feeds.   - NPH resumed, however FS continues to wax and wane and case discussed with endocrine   - morning hypoglycemic episode (3/11), changed lantus to 8 per endocrine. Continue with lispro 5 with ISS for now.   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 Patent recently admitted in 6/2024 for left pontine and cerebellar CVA requiring tracheostomy. While at [facility] patient decannulated and passed SS with advanced diet to easy to chew with thin liquids, but complicated COVID requiring transfer to [facility] in 7/2024 and then ultimately discharged home. Per wife patient was doing well at home until 10/2024 when he was found with RLL with concern for aspiration requiring intubation, then tracheostomy, and ultimately discharged to NH with vent dependence in 11/2024. Patient now presented for RUE fistulogram and angioplasty with vascular, however course complicated by leukocytosis with concern for recurrent trachitis vs PNA and UTI, AOCD and hyperglycemia. Admitted to RCU for further management. Found with  PSA trachitis/ PNA and enterococcus faecium UTI. Course complicated by RUE brachial DVT and hypoxia with HD with concern for volume down vs PE? Volume removal held and hypoxia improved. AC given empirically and CTA CHEST with no PE. Case discussed with vascular and since brachial DVT appears old no need for chronic AC . Course further complicated by PSA LLL PNA and continued on zosyn.     NEUROLOGY  # Poor mentation second to metabolic encephalopathy vs psychosomatic/ depression   - Hx of L cerebellar and pontene embolic CVA x 2 with residual R hemiplegia   - AOx0, bedbound, and nonverbal at baseline per report, however per exam patient able to open eyes, able to follow commands on left (LUE>LLE) with SEVERE weakness, however noted with R hemiplegia per baseline  - Nods yes and no occasionally however keeps eyes closed likely psychosomatic vs metabolic encephalopathy with infections?   - Last CTH in 10/2024 with no acute findings   - Hold RPT CTH for now   - Monitor mentation     CARDIOVASCULAR  # Hx of HTN and HLD  - Continue on hydral 50 (increased 3/2)   - Continue on norvasc 10   - Monitor BP     # Incidental Pericardial effusion   - Incidental small pericardial effusion seen adjacent to right ventricle, however IVC appears flat and LE without edema with low concern for RV failure.   - Prior TTE reviewed from 4/2024 with normal RVLVSF with no pericardial effusion noted at the time.   - TTE 2/23 with EF 65, normal LVRVSF, mild LAD, normal RA, mild pulmonary hypertension, and small pericardial effusion noted adjacent to the anterior right ventricle with no echocardiographic evidence of tamponade  - Monitor hemodynamics     RESPIRATORY  # Respiratory failure second to PNA vs volume down vs PE?   - Hx of COPD with chronic respiratory failure with tracheostomy and vent dependence  - Admitted for angioplasty of RUE AVF, however course complicated by leukocytosis with concern for recurrent trachitis/ PNA.   - Course complicated by hypoxia during HD likely volume down vs migration of RUE brachial DVT with PE?   - Held volume removal, bolus given, and hypoxia improved.   - CTA CHEST without PE  - Course complicated by LLL mucous plug and IPV started with improvement   - Continue on albuterol, NS 0.9 and IPV  - Continue on vent 15/400/6/40    HEENT   # Hemoptysis   - Wife reported hemoptysis while at nursing home 2 weeks prior to admission   - No hemoptysis noted on admission   - Hemoptysis returned in house   - CTA CHEST 2/24 without PE  - Bronch 2/26 with no evidence of bleeding   - Bleeding resolved     GI  # Dysphagia with PEG   - Passed SS with advanced diet to easy to chew with thin liquids in 7/2024, however since recurrent aspiration in 10/2024 now with PEG/ vent dependence   - Continue on PEG TF and changed to bolus feeds   - Monitor tolerance     RENAL  # ESRD on HD   # Dehydration   - Resumed on HD with no flow issue from AVF   - Course complicated by hypoxia with concern for volume down given small IVC and elevated lactate on 2/21  - Volume removal held, lactate improved, and hypoxia improved.   - Continue on HD MWF in house per renal   - Return to HD MTRF outpatient   - Monitor renal function, electrolytes and UOP     # AVF trouble   # AVF oozing   - Presented for RUE fistulogram and angioplasty with vascular on 2/18   - Oozing from AVF post HD, surgicell placed, and AC held with improvement.  - Discussed with vascular and no need for AC given DVT as below is chronic   - RPT DOPPLER with again noted DVT in right stented brachial vein, however als noted with significantly elevated flow velocities with turbulence at the anastomotic site. Wall thickening noted at the proximal segment of the outflow vein, which is otherwise patent. Distal segment of the outflow vein appears patent without thrombosis evidence.   - Discussed with vascular and given patent AVF with no HD trouble no need for fistulogram at this time.     # Hyponatremia likely volume down   - Concern for volume issues, however overall appears volume down with serum osmo 307  - Sodium 128 and improved with HD/ bolus during HD.   - Trend sodium with HD for now     # Elevated lactate   - Lactate elevated likely second to volume down?   - Post bolus lactate trending down from 3.3 to 2.7 to 1.3    INFECTIOUS DISEASE  # Recurrent LLL PNA   - CXR with LLL mucous plug   - POCUS with LLL consolidation vs atelectasis   - Recent BAL with PSA as below   - FULL RVP negative  - SCx with PSA  - Fevers returned and zosyn (3/3 - 3/11), last dose 3/11  - WBC rising and low grade fever overnight; Discussed with ID, recommended CT chest, abdomen and pelvis  - Restarted Zosyn empirically (3/12 -)    # Hemoptysis   - Hemoptysis as above   - BAL with PSA as prior   - No fever and monitor off ABX     # Trachitis vs PNA/ UTI   - Hx of steno and PSA in sputum   - Flu/ COVID negative   - MRSA negative   - UCx with enterococcus   - SCx with  PSA   - Found with leukocytosis and started on cefepime and christiano (2/19) and vanco on HD days (2/21).   - No further steno seen in sputum and continue on cefepime (2/19 - 2/25) and vanco on HD days (2/21, 2/22 - 2/25)   - WBC increased likely reactive to hypoxic event and now improving  - ID following     # PPX   - MRSA PCR negative  - Candida auris PCR POSITIVE  - Continue on isolation precautions per IC    HEME  # AOCD   - Presented for angioplasty and found with anemia to 6.7 s/p 1U PRBC 2/19 and 1/2 PRBC 2/23   - Anemia panel with concern for AOCD   - Continue on EPO QIW and Fe   - Monitor HH     # Heparin resistance?   - PTT persistently low despite increasing heparin GTT   - Resume on heparin GTT and anti Xa level supra therapeutic on but PTT remained low  - Patient ultimately with concern for heparin resistance    VASCULAR  # RUE DVT   - RUE edema noted with age-indeterminate, non-occlusive deep vein thrombosis noted in the right brachial vein.   - Case discussed with vascular who recommends full AC   - CTH from prior with encephalomalcia, however no hx of intracranial bleed   - Prior UGIB while on AC, however discharged on eliquis in 7/2024 and completed 6 month course for RLE DVT   - Continue on heparin GTT, however held for hemoptysis and resistance   - Continued on argatroban with good tolerance and then changed to eliquis   - Case discussed vascular and DVT likely chronic and no need for eliquis     ENDOCRINE  # DM2 A1C 14.0 (1/2024) > 6.4 (2/2025)  - Presented with hyperglycemia and continued on lantus and ISS   - Increased lantus, however FS continues to trend in 200s and changed to NPH with improvement.  - TF interrupted for bronchoscopy and adjustment to bolus feeds and FS dropped.   - NPH stopped and FS improved, however trended back up with continuation of tube feeds.   - NPH resumed, however FS continues to wax and wane and case discussed with endocrine   - morning hypoglycemic episode (3/11), changed lantus to 8 per endocrine. Continue with lispro 5 with ISS for now.   - Sugars rising after lowering Lantus dose, will follow up with endocrine   - Monitor FS and adjust as needed  - Endocrine following     ETHICS/ GOC    - Palliative discussion on 3/5  - Remains FULL CODE     DISPO - Back to NH when able

## 2025-03-12 NOTE — PROGRESS NOTE ADULT - ASSESSMENT
75 yo M with hx trach/vent (last changed 10/23/24), CVA x2 with residual R hemiplegia, COPD, ESRD on HD MWF (last 2/17), pressure ulcer presenting to [facility] ED for leukocytosis. Patient was intially up in the cath lab earlier today with vascular surgery for fistulogram and noted to have a WBC of 18 and sent to ED. Patient nonverbal at baseline, history provided by son. Per wife, patient at normal baseline, somewhat able to make needs known and is not complaining of any pain. . Endocrinology was consulted for management of diabetes mellitus type 2- pt on TF with hyperglycemia.     #Type 2 Diabetes Mellitus  - HbA1c 6.4  ; home regimen: pt on basal/bolus and on TF outpt- as per wife pt on humalog 2 units TID and flextouch pen (tresiba?) 6 units at bedtime when pt was home- pt currently from [facility]- please confirm what [employer] has been doing with regards to pt insulin regimen.  -egfr: 23    Plan:   - FS goal 100-180: FS above goal last night and this morning. FS at goal this afternoon.   - Pt getting TF nepro carb stead bolus feeds of 290ml q 6 hours via PEG- pt was on NPH q 6 hours  - Increase to 8 units of lantus QHS tonight  - Increase slightly to 5 units of Admelog q 6 hours (give prior to giving tube feed bolus). hold if holding TF.  - Continue low Admelog correction scale q 6 hours   - Please check FSG before q6h while on TF and if NPO  - Please update endocrine if any changes to tube feeding - if no longer bolus or if rate changes.   - RD consult  - hypoglycemia orderset prn  - extensively discussed importance of glycemic control to prevent micro- and macrovascular complications  - discussed importance of weight loss, exercise, and changing diet   - reviewed symptoms and management of hypoglycemia  - reviewed basics of insulin administration and pharmacokinetics, glucometer monitoring, as well as glycemic goals for outpatient setting  - Discharge planning: If continues bolus feeds on discharge then pt will continue basal/bolus regimen doses TBD on dc.     #Hypertension  - BP goal <130/80  -elevated BP  -Pt on norvasc and hydralazine  - Management as per primary team    #Hyperlipidemia  - check fasting lipid panel  -goal LDL <70  Plan:   - statin: on lipitor 20mg at bedtime via PEG    #ESRD on HD  renal following  monitor electrolytes      Bhupendra Hollis, [facility]  Nurse Practitioner  Division of Endocrinology & Diabetes  contact on teams    If before 9AM or after 5PM, or on weekends/holidays, please call the Endocrine answering service for assistance ([phone]).  For nonurgent matters, please email [email or web address] for assistance.    73 yo M with hx trach/vent (last changed 10/23/24), CVA x2 with residual R hemiplegia, COPD, ESRD on HD MWF (last 2/17), pressure ulcer presenting to [facility] ED for leukocytosis. Patient was intially up in the cath lab earlier today with vascular surgery for fistulogram and noted to have a WBC of 18 and sent to ED. Patient nonverbal at baseline, history provided by son. Per wife, patient at normal baseline, somewhat able to make needs known and is not complaining of any pain. . Endocrinology was consulted for management of diabetes mellitus type 2- pt on TF with hyperglycemia.     #Type 2 Diabetes Mellitus  - HbA1c 6.4  ; home regimen: pt on basal/bolus and on TF outpt- as per wife pt on humalog 2 units TID and flextouch pen (tresiba?) 6 units at bedtime when pt was home- pt currently from [employer]- please confirm what [employer] has been doing with regards to pt insulin regimen.  -egfr: 23    Plan:   - FS goal 100-180: FS above goal last night and this morning. FS at goal this afternoon.   - Pt getting TF nepro carb stead bolus feeds of 290ml q 6 hours via PEG- pt was on NPH q 6 hours  - Increase to 10 units of lantus QHS tonight  - Increase slightly to 5 units of Admelog q 6 hours (give prior to giving tube feed bolus). hold if holding TF.  - Continue low Admelog correction scale q 6 hours   - Please check FSG before q6h while on TF and if NPO  - Please update endocrine if any changes to tube feeding - if no longer bolus or if rate changes.   - RD consult  - hypoglycemia orderset prn  - extensively discussed importance of glycemic control to prevent micro- and macrovascular complications  - discussed importance of weight loss, exercise, and changing diet   - reviewed symptoms and management of hypoglycemia  - reviewed basics of insulin administration and pharmacokinetics, glucometer monitoring, as well as glycemic goals for outpatient setting  - Discharge planning: If continues bolus feeds on discharge then pt will continue basal/bolus regimen doses TBD on dc.     #Hypertension  - BP goal <130/80  -elevated BP  -Pt on norvasc and hydralazine  - Management as per primary team    #Hyperlipidemia  - check fasting lipid panel  -goal LDL <70  Plan:   - statin: on lipitor 20mg at bedtime via PEG    #ESRD on HD  renal following  monitor electrolytes      Bhupendra Hollis, [facility]  Nurse Practitioner  Division of Endocrinology & Diabetes  contact on teams    If before 9AM or after 5PM, or on weekends/holidays, please call the Endocrine answering service for assistance ([phone]).  For nonurgent matters, please email [email or web address] for assistance.

## 2025-03-12 NOTE — PROGRESS NOTE ADULT - NS PANP COMMENT GEN_ALL_CORE FT
74M w/ ESRD on HD via fistula, HTN, DMII, and recent prolonged hospitalization (4-6/2024) for baclofen toxicity and acute ischemic CVA of the left talya/cerebellum c/b acute hypoxemic and hypercapnic respiratory failure s/p ETT intubation/MV with failure to wean from ventilator s/p tracheostomy with hospital course further complicated by aspiration and B cereus bacteremia and RLE DVT followed by citrobacter PNA, GIB i/s/o AOCD, and fistula stenosis s/p fistulogram 6/4 ultimately transferred to acute rehab, decannulated, and discharged home until 10/2024 when pt returned to OSH with aspiration PNA c/b hypoxemic respiratory failure requiring ETT intubation/MV followed by tracheostomy placement and d/c to LTCF ([facility]) 11/2024 now presenting to [facility] on 2/18/25 for RUE fistulogram/angioplasty with vascular sx but with hospital course c/b concern fo recurrent PNA with uncontrolled hyperglycemia admitted to RCU for further medical management.    Rising leukocytosis and low grade temperature.  Appreciate ID follow up.  Will follow up pan CT.   Resume Zosyn.

## 2025-03-12 NOTE — PROGRESS NOTE ADULT - SUBJECTIVE AND OBJECTIVE BOX
[facility]   MD ELIANE BHAGAT MD ANGELA WONG, PA    TEL:  OFFICE: [phone]  From 5pm-7am Answering Service [phone]    -- RENAL FOLLOW UP NOTE ---Date of Service 03-12-25 @ 15:19    Patient is a 74y old  Male who presents with a chief complaint of 2/18/25 was in cath lab earlier today with vascular surgery for fistulogram and noted to have a WBC of 18 and sent to ED and admitted (04 Mar 2025 12:22)      Patient seen and examined at bedside.    VITALS:  T(F): 96.9 (03-12-25 @ 14:30), Max: 100.3 (03-12-25 @ 00:10)  HR: 96 (03-12-25 @ 15:10)  BP: 160/72 (03-12-25 @ 15:10)  RR: 23 (03-12-25 @ 14:30)  SpO2: 100% (03-12-25 @ 14:30)  Wt(kg): --    03-11 @ 07:01  -  03-12 @ 07:00  --------------------------------------------------------  IN: 1990 mL / OUT: 0 mL / NET: 1990 mL    03-12 @ 07:01  -  03-12 @ 15:19  --------------------------------------------------------  IN: 400 mL / OUT: 2400 mL / NET: -2000 mL          PHYSICAL EXAM:  General: nonverbal  Neck: +trach  Respiratory: CTAB, no wheezes, rales or rhonchi  Cardiovascular: S1, S2, RRR  Gastrointestinal: +peg  Extremities: No peripheral edema    Hospital Medications:   MEDICATIONS  (STANDING):  albuterol    0.083% 2.5 milliGRAM(s) Nebulizer every 6 hours  amLODIPine   Tablet 10 milliGRAM(s) Oral daily  AQUAPHOR (petrolatum Ointment) 1 Application(s) Topical two times a day  atorvastatin 20 milliGRAM(s) Oral at bedtime  chlorhexidine 0.12% Liquid 15 milliLiter(s) Oral Mucosa every 12 hours  chlorhexidine 2% Cloths 1 Application(s) Topical daily  dextrose 5%. 1000 milliLiter(s) (100 mL/Hr) IV Continuous <Continuous>  dextrose 5%. 1000 milliLiter(s) (50 mL/Hr) IV Continuous <Continuous>  dextrose 50% Injectable 25 Gram(s) IV Push once  dextrose 50% Injectable 12.5 Gram(s) IV Push once  dextrose 50% Injectable 25 Gram(s) IV Push once  epoetin reilly-epbx (RETACRIT) Injectable 19354 Unit(s) IV Push <User Schedule>  ferrous    sulfate Liquid 300 milliGRAM(s) Enteral Tube daily  glucagon  Injectable 1 milliGRAM(s) IntraMuscular once  heparin   Injectable 5000 Unit(s) SubCutaneous every 12 hours  hydrALAZINE 50 milliGRAM(s) Oral three times a day  insulin glargine Injectable (LANTUS) 10 Unit(s) SubCutaneous at bedtime  insulin lispro (ADMELOG) corrective regimen sliding scale   SubCutaneous every 6 hours  insulin lispro Injectable (ADMELOG) 5 Unit(s) SubCutaneous every 6 hours  Nephro-noam 1 Tablet(s) Oral daily  pantoprazole   Suspension 40 milliGRAM(s) Oral before breakfast  piperacillin/tazobactam IVPB.. 3.375 Gram(s) IV Intermittent every 12 hours  sodium chloride 0.9% for Nebulization 3 milliLiter(s) Nebulizer every 6 hours      LABS:  03-12    131[L]  |  89[L]  |  48[H]  ----------------------------<  180[H]  3.9   |  26  |  3.35[H]    Ca    9.6      12 Mar 2025 04:53  Phos  3.6     03-12  Mg     2.80     03-12      Creatinine Trend: 3.35 <--, 2.44 <--, 3.98 <--, 3.05 <--, 2.28 <--, 3.24 <--, 2.47 <--    Phosphorus: 3.6 mg/dL (03-12 @ 04:53)                              7.5    18.73 )-----------( 283      ( 12 Mar 2025 04:53 )             24.4     Urine Studies:  Urinalysis - [03-12-25 @ 04:53]      Color  / Appearance  / SG  / pH       Gluc 180 / Ketone   / Bili  / Urobili        Blood  / Protein  / Leuk Est  / Nitrite       RBC  / WBC  / Hyaline  / Gran  / Sq Epi  / Non Sq Epi  / Bacteria       Iron 46, TIBC 142, %sat 32      [02-19-25 @ 11:39]  Ferritin 3601      [02-19-25 @ 11:39]  PTH -- (Ca --)      [03-09-25 @ 05:20]   25  PTH -- (Ca --)      [05-26-24 @ 01:20]   122  TSH 1.660      [10-05-24 @ 08:37]  Lipid: chol --, , HDL --, LDL --      [10-18-24 @ 06:00]    HBsAb 654.8      [02-19-25 @ 19:05]  HBsAg Nonreact      [02-19-25 @ 19:05]  HBcAb Nonreact      [02-19-25 @ 19:05]  HCV 0.19, Nonreact      [02-19-25 @ 19:05]      RADIOLOGY & ADDITIONAL STUDIES:

## 2025-03-12 NOTE — PROGRESS NOTE ADULT - ASSESSMENT
74-year-old male hx trach/vent - , CVA, COPD, stage renal disease on dialysis 4 times a week ([facility]) history of pressure ulcer.  Patient presents the ED today for elevated white count. nephrology consulted for dialysis needs    ESRD:  from [facility]  On HD MTTsF via an A-V fistula. s/p fistulogram by vascular 2/18  Continue MWF in hospital  Prolonged bleeding post HD -- vascular consulted. no plan for intervention  s/p hd 3/10 uf 2L hd today  consent from wife in dialysis unit.  monitor bmp    Hypertension  BP acceptable.  continue norvasc 10 daily and hydralazine increased to 50 tid 3/2  titrate hydralazine to 100mg TID if needed  max uf as tolerated  monitor BP    Anemia:  s/p 1 unit PRBC 2/23  epo with hd  iron sat >20  monitor Hgb  Transfuse for Hg < 7.    leukocytosis  sepsis work up per team  On Zosyn.    hyponatremia  in setting of esrd and hyperglycemia  optimize dm control  hd per schedule with UF  monitor Na    Hypokalemia  Dialyze w/ 4K bath   supplement if k<3.4  Monitor K.    Hypermagnesemia  HD per schedule.  Monitor Mg.    CKD - MBD:  PTH 25 - low for CKD stage.  Received PhosNaK x1 on 3/8.  PO4 improved.  Monitor PO4 and Ca daily.

## 2025-03-12 NOTE — PROGRESS NOTE ADULT - SUBJECTIVE AND OBJECTIVE BOX
[facility]  AUGUST Carbajal Y. Patel, S. Shah, G. Casimir  [phone]  ([phone] - weekdays after 5pm and weekends)    Name: JIMMY BLAND  Age/Gender: 74y Male  MRN: [id number]    Interval History:  Notes reviewed.   leukocytosis increased today  Tmax 100.3    Allergies: No Known Allergies      Objective:  Vitals:   T(F): 96.9 (03-12-25 @ 11:15), Max: 100.3 (03-12-25 @ 00:10)  HR: 84 (03-12-25 @ 12:04) (80 - 103)  BP: 174/65 (03-12-25 @ 11:15) (94/68 - 174/65)  RR: 20 (03-12-25 @ 11:15) (16 - 23)  SpO2: 100% (03-12-25 @ 12:04) (97% - 100%)  Physical Examination:  General: frail appearing  HEENT: anicteric, tracheostomy, on vent  Cardio: S1, S2, normal rate  Resp: decreased breath sounds  Abd: Soft. Nondistended. PEG  Ext: no LE edema  Skin: warm, dry    Laboratory Studies:  CBC:                       7.5    18.73 )-----------( 283      ( 12 Mar 2025 04:53 )             24.4     WBC Trend:  18.73 03-12-25 @ 04:53  14.20 03-11-25 @ 05:50  23.01 03-10-25 @ 17:00  21.01 03-09-25 @ 05:20  18.15 03-08-25 @ 05:30  15.35 03-07-25 @ 22:30  11.89 03-07-25 @ 07:10  13.03 03-06-25 @ 05:45    CMP: 03-12    131[L]  |  89[L]  |  48[H]  ----------------------------<  180[H]  3.9   |  26  |  3.35[H]    Ca    9.6      12 Mar 2025 04:53  Phos  3.6     03-12  Mg     2.80     03-12            Urinalysis Basic - ( 12 Mar 2025 04:53 )    Color: x / Appearance: x / SG: x / pH: x  Gluc: 180 mg/dL / Ketone: x  / Bili: x / Urobili: x   Blood: x / Protein: x / Nitrite: x   Leuk Esterase: x / RBC: x / WBC x   Sq Epi: x / Non Sq Epi: x / Bacteria: x      Microbiology: reviewed     Culture - Blood (collected 03-09-25 @ 05:20)  Source: Blood Blood-Peripheral  Preliminary Report (03-12-25 @ 09:01):    No growth at 72 Hours    Culture - Blood (collected 03-09-25 @ 05:05)  Source: Blood Blood-Venous  Preliminary Report (03-12-25 @ 09:01):    No growth at 72 Hours    Culture - Blood (collected 03-03-25 @ 16:27)  Source: Blood Blood-Peripheral  Final Report (03-08-25 @ 22:01):    No growth at 5 days    Culture - Blood (collected 03-03-25 @ 16:23)  Source: Blood Blood-Venous  Final Report (03-08-25 @ 22:01):    No growth at 5 days    Culture - Sputum (collected 03-03-25 @ 14:10)  Source: Trach Asp Tracheal Aspirate  Gram Stain (03-04-25 @ 04:28):    Few polymorphonuclear leukocytes per low power field    No Squamous epithelial cells per low power field    Moderate Gram Negative Rods seen per oil power field  Final Report (03-05-25 @ 16:12):    Numerous Pseudomonas aeruginosa (Carbapenem Resistant)    Commensal dominick consistent with body site  Organism: Pseudomonas aeruginosa (Carbapenem Resistant) (03-05-25 @ 16:12)  Organism: Pseudomonas aeruginosa (Carbapenem Resistant) (03-05-25 @ 16:12)      Method Type: CarbaR      -  Resistance Gene to Carbapenem: Nondet  Organism: Pseudomonas aeruginosa (Carbapenem Resistant) (03-05-25 @ 16:12)      Method Type: VIDA      -  Aztreonam: S <=4      -  Cefepime: S <=2      -  Ceftazidime: S 4      -  Ceftazidime/Avibactam: S <=4      -  Ceftolozane/tazobactam: S <=2      -  Ciprofloxacin: S <=0.25      -  Imipenem: R 8      -  Levofloxacin: S <=0.5      -  Meropenem: R 8      -  Piperacillin/Tazobactam: S <=8    Culture - Fungal, Bronchial (collected 02-26-25 @ 16:43)  Source: Bronchial Bronchial Lavage  Preliminary Report (03-05-25 @ 23:02):    No fungus isolated at 1 week.    Culture - Bronchial (collected 02-26-25 @ 16:43)  Source: Bronchial Bronchial Lavage  Gram Stain (02-26-25 @ 23:04):    Few polymorphonuclear leukocytes per low power field    No squamous epithelial cells    Few Gram Negative Rods per oil power field  Final Report (02-28-25 @ 17:24):    Few Pseudomonas aeruginosa    Commensal dominick consistent with body site  Organism: Pseudomonas aeruginosa (02-28-25 @ 17:24)  Organism: Pseudomonas aeruginosa (02-28-25 @ 17:24)      Method Type: VIDA      -  Aztreonam: S <=4      -  Cefepime: S <=2      -  Ceftazidime: S <=1      -  Ciprofloxacin: S <=0.25      -  Imipenem: S 2      -  Levofloxacin: S <=0.5      -  Meropenem: S <=1      -  Piperacillin/Tazobactam: S <=8        Radiology: reviewed     Medications:  albuterol    0.083% 2.5 milliGRAM(s) Nebulizer every 6 hours  amLODIPine   Tablet 10 milliGRAM(s) Oral daily  AQUAPHOR (petrolatum Ointment) 1 Application(s) Topical two times a day  atorvastatin 20 milliGRAM(s) Oral at bedtime  chlorhexidine 0.12% Liquid 15 milliLiter(s) Oral Mucosa every 12 hours  chlorhexidine 2% Cloths 1 Application(s) Topical daily  dextrose 5%. 1000 milliLiter(s) IV Continuous <Continuous>  dextrose 5%. 1000 milliLiter(s) IV Continuous <Continuous>  dextrose 50% Injectable 25 Gram(s) IV Push once  dextrose 50% Injectable 12.5 Gram(s) IV Push once  dextrose 50% Injectable 25 Gram(s) IV Push once  dextrose Oral Gel 15 Gram(s) Oral once PRN  epoetin reilly-epbx (RETACRIT) Injectable 48460 Unit(s) IV Push <User Schedule>  ferrous    sulfate Liquid 300 milliGRAM(s) Enteral Tube daily  glucagon  Injectable 1 milliGRAM(s) IntraMuscular once  heparin   Injectable 5000 Unit(s) SubCutaneous every 12 hours  hydrALAZINE 50 milliGRAM(s) Oral three times a day  insulin glargine Injectable (LANTUS) 10 Unit(s) SubCutaneous at bedtime  insulin lispro (ADMELOG) corrective regimen sliding scale   SubCutaneous every 6 hours  insulin lispro Injectable (ADMELOG) 4 Unit(s) SubCutaneous every 6 hours  Nephro-noam 1 Tablet(s) Oral daily  pantoprazole   Suspension 40 milliGRAM(s) Oral before breakfast  piperacillin/tazobactam IVPB.. 3.375 Gram(s) IV Intermittent every 12 hours  sodium chloride 0.9% Bolus. 100 milliLiter(s) IV Bolus every 5 minutes PRN  sodium chloride 0.9% for Nebulization 3 milliLiter(s) Nebulizer every 6 hours    Antimicrobials:  piperacillin/tazobactam IVPB.. 3.375 Gram(s) IV Intermittent every 12 hours

## 2025-03-12 NOTE — PROGRESS NOTE ADULT - ASSESSMENT
73 y/o M PMhx trach/vent (last changed 10/23/24), CVA x2 with residual R hemiplegia, COPD, ESRD on HD MWF who presented to ED for leukocytosis when initially planned for fistulogram and noted to have a WBC of 18.   he was treated for E faecium UTI, and  pseudomonas VAP s/p cefepime, completed 2/25  he developed fever and was started on zosyn  bronch cx grew pseudomonas    pseudomonas PNA  fever- resolved  febrile 3/3  - CXR 3/3 w/ near total L lung opacification  trach aspirate pseudomonas sensitivities reviewed  s/p zosyn x 7 days, completed 3/10    DVT  RUE US- age-indeterminate, non-occlusive deep vein thrombosis noted in the right brachial vein  on eliquis    ESRD  HD per nephrology    leukocytosis increased today  Tmax 100.3    Recommendations  restart zosyn for now  check CT C/A/P  aspiration precautions  contact isolation for candida auris    Steven Torres M.D.  [facility]  [phone]  After 5pm on weekdays and all day on weekends - please call [phone]  Available on [employer] teams    Thank you for consulting us and involving us in the management of this patients case. In addition to reviewing history, imaging, documents, labs, microbiology, took into account antibiotic stewardship, local antibiogram and infection control strategies and potential transmission issues.

## 2025-03-12 NOTE — PROGRESS NOTE ADULT - SUBJECTIVE AND OBJECTIVE BOX
Chief Complaint: type 2 DM    History: saw pt at bedside, pt getting HD at bedside. Pt trach/PEG tolerating bolus tube feeds. FS above goal last night and this morning. FS at goal this afternoon.     MEDICATIONS  (STANDING):  albuterol    0.083% 2.5 milliGRAM(s) Nebulizer every 6 hours  amLODIPine   Tablet 10 milliGRAM(s) Oral daily  AQUAPHOR (petrolatum Ointment) 1 Application(s) Topical two times a day  atorvastatin 20 milliGRAM(s) Oral at bedtime  chlorhexidine 0.12% Liquid 15 milliLiter(s) Oral Mucosa every 12 hours  chlorhexidine 2% Cloths 1 Application(s) Topical daily  dextrose 5%. 1000 milliLiter(s) (100 mL/Hr) IV Continuous <Continuous>  dextrose 5%. 1000 milliLiter(s) (50 mL/Hr) IV Continuous <Continuous>  dextrose 50% Injectable 25 Gram(s) IV Push once  dextrose 50% Injectable 12.5 Gram(s) IV Push once  dextrose 50% Injectable 25 Gram(s) IV Push once  epoetin reilly-epbx (RETACRIT) Injectable 01496 Unit(s) IV Push <User Schedule>  ferrous    sulfate Liquid 300 milliGRAM(s) Enteral Tube daily  glucagon  Injectable 1 milliGRAM(s) IntraMuscular once  heparin   Injectable 5000 Unit(s) SubCutaneous every 12 hours  hydrALAZINE 50 milliGRAM(s) Oral three times a day  insulin glargine Injectable (LANTUS) 10 Unit(s) SubCutaneous at bedtime  insulin lispro (ADMELOG) corrective regimen sliding scale   SubCutaneous every 6 hours  insulin lispro Injectable (ADMELOG) 5 Unit(s) SubCutaneous every 6 hours  Nephro-noam 1 Tablet(s) Oral daily  pantoprazole   Suspension 40 milliGRAM(s) Oral before breakfast  piperacillin/tazobactam IVPB.. 3.375 Gram(s) IV Intermittent every 12 hours  sodium chloride 0.9% for Nebulization 3 milliLiter(s) Nebulizer every 6 hours    MEDICATIONS  (PRN):  dextrose Oral Gel 15 Gram(s) Oral once PRN Blood Glucose LESS THAN 70 milliGRAM(s)/deciliter  sodium chloride 0.9% Bolus. 100 milliLiter(s) IV Bolus every 5 minutes PRN SBP LESS THAN or EQUAL to 80 mmHg      Allergies    No Known Allergies    Intolerances      Review of Systems:    UNABLE TO OBTAIN    PHYSICAL EXAM:  VITALS: T(C): 36.1 (03-12-25 @ 11:15)  T(F): 96.9 (03-12-25 @ 11:15), Max: 100.3 (03-12-25 @ 00:10)  HR: 84 (03-12-25 @ 12:04) (80 - 103)  BP: 174/65 (03-12-25 @ 11:15) (94/68 - 174/65)  RR:  (16 - 23)  SpO2:  (97% - 100%)  Wt(kg): --  GENERAL: NAD  EYES: No proptosis  RESPIRATORY: non labored breathing- trach/ventilator  CARDIOVASCULAR: Regular rate and rhythm  GI: PEG on TF- tolerating bolus feeds      CAPILLARY BLOOD GLUCOSE      POCT Blood Glucose.: 176 mg/dL (12 Mar 2025 11:12)  POCT Blood Glucose.: 246 mg/dL (12 Mar 2025 05:56)  POCT Blood Glucose.: 247 mg/dL (11 Mar 2025 23:37)  POCT Blood Glucose.: 138 mg/dL (11 Mar 2025 17:13)      03-12    131[L]  |  89[L]  |  48[H]  ----------------------------<  180[H]  3.9   |  26  |  3.35[H]    eGFR: 19[L]    Ca    9.6      03-12  Mg     2.80     03-12  Phos  3.6     03-12            Thyroid Function Tests:        A1C with Estimated Average Glucose Result: 6.4 % (02-19-25 @ 06:45)  A1C with Estimated Average Glucose Result: 4.8 % (10-05-24 @ 08:37)  A1C with Estimated Average Glucose Result: 5.9 % (07-16-24 @ 10:25)  A1C with Estimated Average Glucose Result: 6.9 % (04-30-24 @ 06:03)          Diet, NPO with Tube Feed:   Tube Feeding Modality: Gastrostomy  Nepro with Carb Steady (NEPRORTH)  Total Volume for 24 Hours (mL): 1160  Bolus  Total Volume of Bolus (mL):  290  Total # of Feeds: 4  Tube Feed Frequency: Every 6 hours   Tube Feed Start Time: 12:00  Bolus Feed Rate (mL per Hour): 290   Bolus Feed Duration (in Hours): 0  Liquid Protein Supplement     Qty per Day:  1 (02-25-25 @ 13:43)

## 2025-03-12 NOTE — PROGRESS NOTE ADULT - NS ATTEST RISK PROBLEM GEN_ALL_CORE FT
pneumonia, uti, sepsis  ventilator dependent  tracheostomy care/in place  cva w/ hemiplegia  copd  esrd on hd  acute DVT
pneumonia, uti, sepsis  ventilator dependent  tracheostomy care/in place  cva w/ hemiplegia  copd  esrd on hd  acute DVT
pneumonia, uti, sepsis  ventilator dependent  tracheostomy care/in place  cva w/ hemiplegia  copd  esrd on hd
Leukocytosis, fever
pneumonia, uti, sepsis  ventilator dependent  tracheostomy care/in place  cva w/ hemiplegia  copd  esrd on hd  acute DVT
pneumonia, uti, sepsis  ventilator dependent  tracheostomy care/in place  cva w/ hemiplegia  copd  esrd on hd

## 2025-03-13 ENCOUNTER — RESULT REVIEW (OUTPATIENT)
Age: 75
End: 2025-03-13

## 2025-03-13 LAB
-  STAPHYLOCOCCUS EPIDERMIDIS, METHICILLIN RESISTANT: SIGNIFICANT CHANGE UP
ANION GAP SERPL CALC-SCNC: 15 MMOL/L — HIGH (ref 7–14)
BASOPHILS # BLD AUTO: 0.06 K/UL — SIGNIFICANT CHANGE UP (ref 0–0.2)
BASOPHILS NFR BLD AUTO: 0.3 % — SIGNIFICANT CHANGE UP (ref 0–2)
BUN SERPL-MCNC: 40 MG/DL — HIGH (ref 7–23)
C AURIS DNA BLD POS QL NAA+PROBE: SIGNIFICANT CHANGE UP
CALCIUM SERPL-MCNC: 9.7 MG/DL — SIGNIFICANT CHANGE UP (ref 8.4–10.5)
CHLORIDE SERPL-SCNC: 93 MMOL/L — LOW (ref 98–107)
CO2 SERPL-SCNC: 27 MMOL/L — SIGNIFICANT CHANGE UP (ref 22–31)
CREAT SERPL-MCNC: 2.38 MG/DL — HIGH (ref 0.5–1.3)
E FAECIUM DNA BLD POS QL NAA+NON-PROBE: SIGNIFICANT CHANGE UP
EGFR: 28 ML/MIN/1.73M2 — LOW
EGFR: 28 ML/MIN/1.73M2 — LOW
EOSINOPHIL # BLD AUTO: 0.04 K/UL — SIGNIFICANT CHANGE UP (ref 0–0.5)
EOSINOPHIL NFR BLD AUTO: 0.2 % — SIGNIFICANT CHANGE UP (ref 0–6)
GLUCOSE BLDC GLUCOMTR-MCNC: 189 MG/DL — HIGH (ref 70–99)
GLUCOSE BLDC GLUCOMTR-MCNC: 204 MG/DL — HIGH (ref 70–99)
GLUCOSE BLDC GLUCOMTR-MCNC: 210 MG/DL — HIGH (ref 70–99)
GLUCOSE BLDC GLUCOMTR-MCNC: 241 MG/DL — HIGH (ref 70–99)
GLUCOSE SERPL-MCNC: 212 MG/DL — HIGH (ref 70–99)
HCT VFR BLD CALC: 25.1 % — LOW (ref 39–50)
HGB BLD-MCNC: 7.7 G/DL — LOW (ref 13–17)
IANC: 16.2 K/UL — HIGH (ref 1.8–7.4)
IMM GRANULOCYTES NFR BLD AUTO: 0.8 % — SIGNIFICANT CHANGE UP (ref 0–0.9)
LYMPHOCYTES # BLD AUTO: 1.04 K/UL — SIGNIFICANT CHANGE UP (ref 1–3.3)
LYMPHOCYTES # BLD AUTO: 5.6 % — LOW (ref 13–44)
MAGNESIUM SERPL-MCNC: 2.5 MG/DL — SIGNIFICANT CHANGE UP (ref 1.6–2.6)
MCHC RBC-ENTMCNC: 30.7 G/DL — LOW (ref 32–36)
MCV RBC AUTO: 76.5 FL — LOW (ref 80–100)
METHOD TYPE: SIGNIFICANT CHANGE UP
MONOCYTES # BLD AUTO: 1.17 K/UL — HIGH (ref 0–0.9)
MONOCYTES NFR BLD AUTO: 6.3 % — SIGNIFICANT CHANGE UP (ref 2–14)
NEUTROPHILS # BLD AUTO: 16.2 K/UL — HIGH (ref 1.8–7.4)
NEUTROPHILS NFR BLD AUTO: 86.8 % — HIGH (ref 43–77)
NRBC # BLD AUTO: 0.16 K/UL — HIGH (ref 0–0)
NRBC # FLD: 0.16 K/UL — HIGH (ref 0–0)
NRBC BLD AUTO-RTO: 0 /100 WBCS — SIGNIFICANT CHANGE UP (ref 0–0)
PHOSPHATE SERPL-MCNC: 2.5 MG/DL — SIGNIFICANT CHANGE UP (ref 2.5–4.5)
PLATELET # BLD AUTO: 326 K/UL — SIGNIFICANT CHANGE UP (ref 150–400)
POTASSIUM SERPL-MCNC: 3.3 MMOL/L — LOW (ref 3.5–5.3)
POTASSIUM SERPL-SCNC: 3.3 MMOL/L — LOW (ref 3.5–5.3)
RBC # BLD: 3.28 M/UL — LOW (ref 4.2–5.8)
RBC # FLD: 18 % — HIGH (ref 10.3–14.5)
SODIUM SERPL-SCNC: 135 MMOL/L — SIGNIFICANT CHANGE UP (ref 135–145)
SPECIMEN SOURCE: SIGNIFICANT CHANGE UP
WBC # BLD: 18.65 K/UL — HIGH (ref 3.8–10.5)

## 2025-03-13 PROCEDURE — 93325 DOPPLER ECHO COLOR FLOW MAPG: CPT | Mod: 26,GC

## 2025-03-13 PROCEDURE — 99233 SBSQ HOSP IP/OBS HIGH 50: CPT

## 2025-03-13 PROCEDURE — 93308 TTE F-UP OR LMTD: CPT | Mod: 26,GC

## 2025-03-13 PROCEDURE — 99232 SBSQ HOSP IP/OBS MODERATE 35: CPT

## 2025-03-13 PROCEDURE — 93321 DOPPLER ECHO F-UP/LMTD STD: CPT | Mod: 26

## 2025-03-13 RX ORDER — INSULIN GLARGINE-YFGN 100 [IU]/ML
12 INJECTION, SOLUTION SUBCUTANEOUS AT BEDTIME
Refills: 0 | Status: DISCONTINUED | OUTPATIENT
Start: 2025-03-13 | End: 2025-03-19

## 2025-03-13 RX ORDER — CASPOFUNGIN ACETATE 5 MG/ML
70 INJECTION, POWDER, LYOPHILIZED, FOR SOLUTION INTRAVENOUS ONCE
Refills: 0 | Status: COMPLETED | OUTPATIENT
Start: 2025-03-13 | End: 2025-03-13

## 2025-03-13 RX ORDER — INSULIN LISPRO 100 U/ML
6 INJECTION, SOLUTION INTRAVENOUS; SUBCUTANEOUS EVERY 6 HOURS
Refills: 0 | Status: DISCONTINUED | OUTPATIENT
Start: 2025-03-13 | End: 2025-03-19

## 2025-03-13 RX ORDER — LABETALOL HYDROCHLORIDE 200 MG/1
100 TABLET, FILM COATED ORAL
Refills: 0 | Status: DISCONTINUED | OUTPATIENT
Start: 2025-03-13 | End: 2025-03-19

## 2025-03-13 RX ORDER — CASPOFUNGIN ACETATE 5 MG/ML
INJECTION, POWDER, LYOPHILIZED, FOR SOLUTION INTRAVENOUS
Refills: 0 | Status: DISCONTINUED | OUTPATIENT
Start: 2025-03-13 | End: 2025-03-19

## 2025-03-13 RX ORDER — CASPOFUNGIN ACETATE 5 MG/ML
50 INJECTION, POWDER, LYOPHILIZED, FOR SOLUTION INTRAVENOUS EVERY 24 HOURS
Refills: 0 | Status: DISCONTINUED | OUTPATIENT
Start: 2025-03-14 | End: 2025-03-19

## 2025-03-13 RX ORDER — VANCOMYCIN HCL IN 5 % DEXTROSE 1.5G/250ML
500 PLASTIC BAG, INJECTION (ML) INTRAVENOUS ONCE
Refills: 0 | Status: COMPLETED | OUTPATIENT
Start: 2025-03-13 | End: 2025-03-13

## 2025-03-13 RX ADMIN — WHITE PETROLATUM 1 APPLICATION(S): 1 OINTMENT TOPICAL at 17:33

## 2025-03-13 RX ADMIN — Medication 2.5 MILLIGRAM(S): at 15:36

## 2025-03-13 RX ADMIN — Medication 25 GRAM(S): at 17:31

## 2025-03-13 RX ADMIN — INSULIN LISPRO 5 UNIT(S): 100 INJECTION, SOLUTION INTRAVENOUS; SUBCUTANEOUS at 05:15

## 2025-03-13 RX ADMIN — Medication 300 MILLIGRAM(S): at 11:08

## 2025-03-13 RX ADMIN — Medication 1 APPLICATION(S): at 11:08

## 2025-03-13 RX ADMIN — INSULIN LISPRO 6 UNIT(S): 100 INJECTION, SOLUTION INTRAVENOUS; SUBCUTANEOUS at 23:53

## 2025-03-13 RX ADMIN — HEPARIN SODIUM 5000 UNIT(S): 1000 INJECTION INTRAVENOUS; SUBCUTANEOUS at 05:13

## 2025-03-13 RX ADMIN — Medication 50 MILLIGRAM(S): at 05:13

## 2025-03-13 RX ADMIN — Medication 40 MILLIGRAM(S): at 05:14

## 2025-03-13 RX ADMIN — INSULIN GLARGINE-YFGN 12 UNIT(S): 100 INJECTION, SOLUTION SUBCUTANEOUS at 23:49

## 2025-03-13 RX ADMIN — INSULIN LISPRO 6 UNIT(S): 100 INJECTION, SOLUTION INTRAVENOUS; SUBCUTANEOUS at 17:30

## 2025-03-13 RX ADMIN — INSULIN LISPRO 6 UNIT(S): 100 INJECTION, SOLUTION INTRAVENOUS; SUBCUTANEOUS at 11:14

## 2025-03-13 RX ADMIN — INSULIN LISPRO 2: 100 INJECTION, SOLUTION INTRAVENOUS; SUBCUTANEOUS at 23:53

## 2025-03-13 RX ADMIN — Medication 50 MILLIGRAM(S): at 13:16

## 2025-03-13 RX ADMIN — INSULIN LISPRO 2: 100 INJECTION, SOLUTION INTRAVENOUS; SUBCUTANEOUS at 05:15

## 2025-03-13 RX ADMIN — CASPOFUNGIN ACETATE 260 MILLIGRAM(S): 5 INJECTION, POWDER, LYOPHILIZED, FOR SOLUTION INTRAVENOUS at 11:06

## 2025-03-13 RX ADMIN — Medication 25 GRAM(S): at 05:12

## 2025-03-13 RX ADMIN — Medication 75 MILLIGRAM(S): at 21:57

## 2025-03-13 RX ADMIN — Medication 2.5 MILLIGRAM(S): at 07:56

## 2025-03-13 RX ADMIN — Medication 2.5 MILLIGRAM(S): at 21:49

## 2025-03-13 RX ADMIN — INSULIN LISPRO 1: 100 INJECTION, SOLUTION INTRAVENOUS; SUBCUTANEOUS at 17:29

## 2025-03-13 RX ADMIN — Medication 15 MILLILITER(S): at 05:13

## 2025-03-13 RX ADMIN — Medication 15 MILLILITER(S): at 17:33

## 2025-03-13 RX ADMIN — Medication 100 MILLIGRAM(S): at 13:16

## 2025-03-13 RX ADMIN — WHITE PETROLATUM 1 APPLICATION(S): 1 OINTMENT TOPICAL at 05:14

## 2025-03-13 RX ADMIN — LABETALOL HYDROCHLORIDE 100 MILLIGRAM(S): 200 TABLET, FILM COATED ORAL at 17:51

## 2025-03-13 RX ADMIN — AMLODIPINE BESYLATE 10 MILLIGRAM(S): 10 TABLET ORAL at 05:13

## 2025-03-13 RX ADMIN — INSULIN LISPRO 2: 100 INJECTION, SOLUTION INTRAVENOUS; SUBCUTANEOUS at 11:14

## 2025-03-13 RX ADMIN — ATORVASTATIN CALCIUM 20 MILLIGRAM(S): 80 TABLET, FILM COATED ORAL at 21:58

## 2025-03-13 RX ADMIN — Medication 1 TABLET(S): at 11:07

## 2025-03-13 RX ADMIN — Medication 2.5 MILLIGRAM(S): at 03:53

## 2025-03-13 RX ADMIN — HEPARIN SODIUM 5000 UNIT(S): 1000 INJECTION INTRAVENOUS; SUBCUTANEOUS at 17:32

## 2025-03-13 NOTE — PROGRESS NOTE ADULT - ASSESSMENT
75 yo M with hx trach/vent (last changed 10/23/24), CVA x2 with residual R hemiplegia, COPD, ESRD on HD MWF (last 2/17), pressure ulcer presenting to [facility] ED for leukocytosis. Patient was intially up in the cath lab earlier today with vascular surgery for fistulogram and noted to have a WBC of 18 and sent to ED. Patient nonverbal at baseline, history provided by son. Per wife, patient at normal baseline, somewhat able to make needs known and is not complaining of any pain. . Endocrinology was consulted for management of diabetes mellitus type 2- pt on TF with hyperglycemia.     #Type 2 Diabetes Mellitus  - HbA1c 6.4  ; home regimen: pt on basal/bolus and on TF outpt- as per wife pt on humalog 2 units TID and flextouch pen (tresiba?) 6 units at bedtime when pt was home- pt currently from [employer]- please confirm what [employer] has been doing with regards to pt insulin regimen.  -egfr: 23    Plan:   - FS goal 100-180: FS above goal today.   - Pt getting TF nepro carb stead bolus feeds of 290ml q 6 hours via PEG- pt was on NPH q 6 hours  - Increase to 12 units of lantus QHS tonight  - Increase slightly to 6 units of Admelog q 6 hours (give prior to giving tube feed bolus). hold if holding TF.  - Continue low Admelog correction scale q 6 hours   - Please check FSG before q6h while on TF and if NPO  - Please update endocrine if any changes to tube feeding - if no longer bolus or if rate changes.   - RD consult  - hypoglycemia orderset prn  - extensively discussed importance of glycemic control to prevent micro- and macrovascular complications  - discussed importance of weight loss, exercise, and changing diet   - reviewed symptoms and management of hypoglycemia  - reviewed basics of insulin administration and pharmacokinetics, glucometer monitoring, as well as glycemic goals for outpatient setting  - Discharge planning: If continues bolus feeds on discharge then pt will continue basal/bolus regimen doses TBD on dc.     #Hypertension  - BP goal <130/80  -elevated BP  -Pt on norvasc and hydralazine  - Management as per primary team    #Hyperlipidemia  - check fasting lipid panel  -goal LDL <70  Plan:   - statin: on lipitor 20mg at bedtime via PEG    #ESRD on HD  renal following  monitor electrolytes      EMILY Greene-BC  Nurse Practitioner  Division of Endocrinology & Diabetes  contact on teams    If before 9AM or after 5PM, or on weekends/holidays, please call the Endocrine answering service for assistance ([phone]).  For nonurgent matters, please email [email or web address] for assistance.

## 2025-03-13 NOTE — ADVANCED PRACTICE NURSE CONSULT - REASON FOR CONSULT
Patient seen for follow up of wounds, last seen 3/6/2025. Patient is a 73 YO M with PMHx of COPD, 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 Patent recently admitted in 6/2024 for left pontine and cerebellar CVA requiring tracheostomy. While at [facility] patient decannulated and passed SS with advanced diet to easy to chew with thin liquids, but complicated COVID requiring transfer to [facility] in 7/2024 and then ultimately discharged home. Per wife patient was doing well at home until 10/2024 when he was found with RLL with concern for aspiration requiring intubation, then tracheostomy, and ultimately discharged to NH with vent dependence in 11/2024. Patient now presented for RUE fistulogram and angioplasty with vascular, however course complicated by leukocytosis with concern for recurrent trachitis vs PNA and UTI, AOCD and hyperglycemia. Admitted to RCU for further management. Found with  PSA trachitis/ PNA and enterococcus faecium UTI. Course complicated by RUE brachial DVT and hypoxia with HD with concern for volume down vs PE? Volume removal held and hypoxia improved. AC given empirically and CTA CHEST with no PE. Case discussed with vascular and since brachial DVT appears old no need for chronic AC . Course further complicated by PSA LLL PNA and continued on zosyn. Completed ABX followed by low grade fever and worsening leukocytosis now restarted back on empiric Zosyn on 3/12 and pending pan CTs. +GPC in blood cultures - now on Capso, Vanco and Zosyn 3/13, infectious disease following.     Chart reviewed: WBC 16.20, H/H 7.7/25.1, INR 2.12, Platelets 326, hA1c 6.4, BMI 20.4, gertrude 13.

## 2025-03-13 NOTE — ADVANCED PRACTICE NURSE CONSULT - RECOMMEDATIONS
Topical recommendations:   ---Sacrum: Cleanse with skin cleanser, pat dry. Apply TRIAD paste twice a day and PRN with incontinent episodes. With episodes of incontinence only remove soiled layer of Triad, then reinforce with thin layer.   ---Bilateral ischium: Cleanse with NS, pat dry. Apply Liquid barrier film to periwound skin (allow to dry). Apply medihoney gel to wound bed, cover with silicone foam with border. Change daily and PRN if soiled.   ---Tracheostomy: Cleanse around trach site with NS. Pat dry. Apply Silicone foam dressing without border beneath trach collar, cut mid dressing to "Y" shape. Change foam dressing every shift and prn. Change trach ties every 3 days.   --- PEG: Cleanse q shift with NS, apply liquid barrier film beneath jalen disc.  If redness noted under jalen disc bumper apply thin foam  dressing without border (Mepilex Lite)- cut to mid dressing with a 'Y' shape.   Secondary securement to abdomen taping in 'H' fashion with Steri-strips.   --- While inpatient, continue low air loss bed therapy, continue heel elevation, continue to turn & reposition per protocol, soft pillow between bony prominences, continue moisture management with barrier creams & single breathable pad, continue measures to decrease friction/shear/pressure. Continue with nutritional support as per dietary/orders.     Continue topical recommendations as ordered. Plan discussed with primary RN Manda Rojas at bedside.   Please contact Wound/Ostomy Care Service Line if we can be of further assistance (ext [phone]). Please reconsult if changes to tissue type noted.

## 2025-03-13 NOTE — CONSULT NOTE ADULT - SUBJECTIVE AND OBJECTIVE BOX
[facility] DEPARTMENT OF OPHTHALMOLOGY - INITIAL ADULT CONSULT  ----------------------------------------------------------------------------------------------------  Kenton Yañez MD PGY-3  Available on teams  ----------------------------------------------------------------------------------------------------    HPI:  75 yo M with hx trach/vent (last changed 10/23/24), CVA x2 with residual R hemiplegia, COPD, ESRD on HD MWF (last 2/17), pressure ulcer presenting to [facility] ED for leukocytosis. Patient was intially up in the cath lab earlier today with vascular surgery for fistulogram and noted to have a WBC of 18 and sent to ED. Patient nonverbal at baseline, history provided by son. Per son, patient at normal baseline, somewhat able to make needs known and is not complaining of any pain.      ED course:Initial vitals T 37.1, HR 74, /68, SpO2 100%. Labs notable for WBC 18, RBC 7.3 (baseline 7.2), , Na 132, K 3.2, bicarb 30, Cr 3.08 (baseline ~3), Alk phos 260, normal LFTs, VBG with pH 7.34, pCO2 65, UA with large leuk esterase, moderate blood, > 6k WBCs, and many bacteria, RVP negative.     Patient to be admitted to RCU as he is trach/vented in setting of leukocytosis 2/2 likely UTI with plan for further evaluation/management.   (18 Feb 2025 18:00)    Interval History: consulted for rule out endophthalmitis     PAST MEDICAL & SURGICAL HISTORY:  ESRD on hemodialysis      HTN (hypertension)      Insulin dependent type 2 diabetes mellitus      History of cerebrovascular accident (CVA) with residual deficit      History of DVT of lower extremity      HLD (hyperlipidemia)      CVA (cerebrovascular accident)      Aphasia      Tracheostomy in place      PEG (percutaneous endoscopic gastrostomy) status      GERD (gastroesophageal reflux disease)      Constipation      Pressure ulcer      Arteriovenous fistula      S/P percutaneous endoscopic gastrostomy (PEG) tube placement      History of tracheostomy        Past Ocular History: SETF  Ophthalmic Medications: STEF  FAMILY HISTORY:  FHx: diabetes mellitus (Father, Aunt)      MEDICATIONS  (STANDING):  albuterol    0.083% 2.5 milliGRAM(s) Nebulizer every 6 hours  amLODIPine   Tablet 10 milliGRAM(s) Oral daily  AQUAPHOR (petrolatum Ointment) 1 Application(s) Topical two times a day  atorvastatin 20 milliGRAM(s) Oral at bedtime  caspofungin IVPB      chlorhexidine 0.12% Liquid 15 milliLiter(s) Oral Mucosa every 12 hours  chlorhexidine 2% Cloths 1 Application(s) Topical daily  dextrose 5%. 1000 milliLiter(s) (100 mL/Hr) IV Continuous <Continuous>  dextrose 5%. 1000 milliLiter(s) (50 mL/Hr) IV Continuous <Continuous>  dextrose 50% Injectable 25 Gram(s) IV Push once  dextrose 50% Injectable 12.5 Gram(s) IV Push once  dextrose 50% Injectable 25 Gram(s) IV Push once  epoetin reilly-epbx (RETACRIT) Injectable 77793 Unit(s) IV Push <User Schedule>  ferrous    sulfate Liquid 300 milliGRAM(s) Enteral Tube daily  glucagon  Injectable 1 milliGRAM(s) IntraMuscular once  heparin   Injectable 5000 Unit(s) SubCutaneous every 12 hours  hydrALAZINE 75 milliGRAM(s) Oral three times a day  insulin glargine Injectable (LANTUS) 12 Unit(s) SubCutaneous at bedtime  insulin lispro (ADMELOG) corrective regimen sliding scale   SubCutaneous every 6 hours  insulin lispro Injectable (ADMELOG) 6 Unit(s) SubCutaneous every 6 hours  labetalol 100 milliGRAM(s) Oral two times a day  Nephro-noam 1 Tablet(s) Oral daily  pantoprazole   Suspension 40 milliGRAM(s) Oral before breakfast  piperacillin/tazobactam IVPB.. 3.375 Gram(s) IV Intermittent every 12 hours  sodium chloride 0.9% for Nebulization 3 milliLiter(s) Nebulizer every 6 hours    MEDICATIONS  (PRN):  dextrose Oral Gel 15 Gram(s) Oral once PRN Blood Glucose LESS THAN 70 milliGRAM(s)/deciliter  sodium chloride 0.9% Bolus. 100 milliLiter(s) IV Bolus every 5 minutes PRN SBP LESS THAN or EQUAL to 80 mmHg    Allergies & Intolerances:   No Known Allergies    ROS: STEF 2/2 Mental status    VITALS: T(C): 36.6 (03-13-25 @ 13:15)  T(F): 97.9 (03-13-25 @ 13:15), Max: 100 (03-12-25 @ 21:37)  HR: 82 (03-13-25 @ 15:39) (82 - 100)  BP: 179/63 (03-13-25 @ 13:15) (158/68 - 190/65)  RR:  (16 - 18)  SpO2:  (98% - 100%)  Wt(kg): --  General: AAO x 0    Ophthalmology Exam:  Visual acuity (sc): STEF  Pupils: PERRL OU, no APD  Ttono: 21 OU  Extraocular movements (EOMs): STEF  Confrontational Visual Field (CVF): STEF  Color Plates: STEF    Pen Light Exam (PLE)  External: Flat OU  Lids/Lashes/Lacrimal Ducts: Flat OU    Sclera/Conjunctiva: W+Q OU  Cornea: Cl OU  Anterior Chamber: D+F OU    Iris: Flat OU  Lens: Cl OU    Fundus Exam: dilated with 1% tropicamide and 2.5% phenylephrine  Approval obtained from primary team for dilation  Patient aware that pupils can remained dilated for at least 4-6 hours  Exam performed with 20D lens    Vitreous: wnl OU  Disc, cup/disc: sharp and pink, 0.4 OU  Macula: wnl OU  Vessels: wnl OU  Periphery: wnl OU    Labs/Imaging:  ***   [facility] DEPARTMENT OF OPHTHALMOLOGY - INITIAL ADULT CONSULT  ----------------------------------------------------------------------------------------------------  Kenton Yañez MD PGY-3  Available on teams  ----------------------------------------------------------------------------------------------------    HPI:  73 yo M with hx trach/vent (last changed 10/23/24), CVA x2 with residual R hemiplegia, COPD, ESRD on HD MWF (last 2/17), pressure ulcer presenting to [facility] ED for leukocytosis. Patient was intially up in the cath lab earlier today with vascular surgery for fistulogram and noted to have a WBC of 18 and sent to ED. Patient nonverbal at baseline, history provided by son. Per son, patient at normal baseline, somewhat able to make needs known and is not complaining of any pain.      ED course:Initial vitals T 37.1, HR 74, /68, SpO2 100%. Labs notable for WBC 18, RBC 7.3 (baseline 7.2), , Na 132, K 3.2, bicarb 30, Cr 3.08 (baseline ~3), Alk phos 260, normal LFTs, VBG with pH 7.34, pCO2 65, UA with large leuk esterase, moderate blood, > 6k WBCs, and many bacteria, RVP negative.     Patient to be admitted to RCU as he is trach/vented in setting of leukocytosis 2/2 likely UTI with plan for further evaluation/management.   (18 Feb 2025 18:00)    Interval History: consulted for rule out endophthalmitis     PAST MEDICAL & SURGICAL HISTORY:  ESRD on hemodialysis      HTN (hypertension)      Insulin dependent type 2 diabetes mellitus      History of cerebrovascular accident (CVA) with residual deficit      History of DVT of lower extremity      HLD (hyperlipidemia)      CVA (cerebrovascular accident)      Aphasia      Tracheostomy in place      PEG (percutaneous endoscopic gastrostomy) status      GERD (gastroesophageal reflux disease)      Constipation      Pressure ulcer      Arteriovenous fistula      S/P percutaneous endoscopic gastrostomy (PEG) tube placement      History of tracheostomy        Past Ocular History: STEF  Ophthalmic Medications: STEF  FAMILY HISTORY:  FHx: diabetes mellitus (Father, Aunt)      MEDICATIONS  (STANDING):  albuterol    0.083% 2.5 milliGRAM(s) Nebulizer every 6 hours  amLODIPine   Tablet 10 milliGRAM(s) Oral daily  AQUAPHOR (petrolatum Ointment) 1 Application(s) Topical two times a day  atorvastatin 20 milliGRAM(s) Oral at bedtime  caspofungin IVPB      chlorhexidine 0.12% Liquid 15 milliLiter(s) Oral Mucosa every 12 hours  chlorhexidine 2% Cloths 1 Application(s) Topical daily  dextrose 5%. 1000 milliLiter(s) (100 mL/Hr) IV Continuous <Continuous>  dextrose 5%. 1000 milliLiter(s) (50 mL/Hr) IV Continuous <Continuous>  dextrose 50% Injectable 25 Gram(s) IV Push once  dextrose 50% Injectable 12.5 Gram(s) IV Push once  dextrose 50% Injectable 25 Gram(s) IV Push once  epoetin reilly-epbx (RETACRIT) Injectable 48229 Unit(s) IV Push <User Schedule>  ferrous    sulfate Liquid 300 milliGRAM(s) Enteral Tube daily  glucagon  Injectable 1 milliGRAM(s) IntraMuscular once  heparin   Injectable 5000 Unit(s) SubCutaneous every 12 hours  hydrALAZINE 75 milliGRAM(s) Oral three times a day  insulin glargine Injectable (LANTUS) 12 Unit(s) SubCutaneous at bedtime  insulin lispro (ADMELOG) corrective regimen sliding scale   SubCutaneous every 6 hours  insulin lispro Injectable (ADMELOG) 6 Unit(s) SubCutaneous every 6 hours  labetalol 100 milliGRAM(s) Oral two times a day  Nephro-noam 1 Tablet(s) Oral daily  pantoprazole   Suspension 40 milliGRAM(s) Oral before breakfast  piperacillin/tazobactam IVPB.. 3.375 Gram(s) IV Intermittent every 12 hours  sodium chloride 0.9% for Nebulization 3 milliLiter(s) Nebulizer every 6 hours    MEDICATIONS  (PRN):  dextrose Oral Gel 15 Gram(s) Oral once PRN Blood Glucose LESS THAN 70 milliGRAM(s)/deciliter  sodium chloride 0.9% Bolus. 100 milliLiter(s) IV Bolus every 5 minutes PRN SBP LESS THAN or EQUAL to 80 mmHg    Allergies & Intolerances:   No Known Allergies    ROS: STEF 2/2 Mental status    VITALS: T(C): 36.6 (03-13-25 @ 13:15)  T(F): 97.9 (03-13-25 @ 13:15), Max: 100 (03-12-25 @ 21:37)  HR: 82 (03-13-25 @ 15:39) (82 - 100)  BP: 179/63 (03-13-25 @ 13:15) (158/68 - 190/65)  RR:  (16 - 18)  SpO2:  (98% - 100%)  Wt(kg): --  General: AAO x 0    Ophthalmology Exam:  Visual acuity (sc): STEF  Pupils: PERRL OU, no APD  Ttono: 21 OU  Extraocular movements (EOMs): STEF  Confrontational Visual Field (CVF): STEF  Color Plates: STEF    Pen Light Exam (PLE)  External: Flat OU  Lids/Lashes/Lacrimal Ducts: Flat OU    Sclera/Conjunctiva: W+Q OU  Cornea: Cl OU  Anterior Chamber: D+F OU    Iris: Flat OU  Lens: cataracts OU    Fundus Exam: dilated with 1% tropicamide and 2.5% phenylephrine  Approval obtained from primary team for dilation  Patient aware that pupils can remained dilated for at least 4-6 hours  Exam performed with 20D lens    Vitreous: wnl OU  Disc, cup/disc: sharp and pink, 0.25 OU  Macula: wnl OU  Vessels: wnl OU  Periphery: wnl OU, limited view    Labs/Imaging:  ***

## 2025-03-13 NOTE — PROVIDER CONTACT NOTE (OTHER) - RECOMMENDATIONS
Provider notified.
Provider notified.
Notify provider
Notify provider.
contact provider
mD cantrell said to retake temp  in one hour.
MD cantrell made aware of temp being 99.1. no new orders.
ROBBIN SALEEM ordered sputum culture, RVP FLU panel, MB lab, blood culutures and chest xray.
contact provider
Notify provider
Antibiotic
BP med
frequent suction required, ambubag used, fiO2 increased, acp notified

## 2025-03-13 NOTE — PROGRESS NOTE ADULT - SUBJECTIVE AND OBJECTIVE BOX
Chief Complaint: type 2 DM    History: saw pt at bedside. Pt trach/PEG tolerating bolus tube feeds. FS above goal today.     MEDICATIONS  (STANDING):  albuterol    0.083% 2.5 milliGRAM(s) Nebulizer every 6 hours  amLODIPine   Tablet 10 milliGRAM(s) Oral daily  AQUAPHOR (petrolatum Ointment) 1 Application(s) Topical two times a day  atorvastatin 20 milliGRAM(s) Oral at bedtime  caspofungin IVPB      chlorhexidine 0.12% Liquid 15 milliLiter(s) Oral Mucosa every 12 hours  chlorhexidine 2% Cloths 1 Application(s) Topical daily  dextrose 5%. 1000 milliLiter(s) (100 mL/Hr) IV Continuous <Continuous>  dextrose 5%. 1000 milliLiter(s) (50 mL/Hr) IV Continuous <Continuous>  dextrose 50% Injectable 25 Gram(s) IV Push once  dextrose 50% Injectable 12.5 Gram(s) IV Push once  dextrose 50% Injectable 25 Gram(s) IV Push once  epoetin reilly-epbx (RETACRIT) Injectable 41848 Unit(s) IV Push <User Schedule>  ferrous    sulfate Liquid 300 milliGRAM(s) Enteral Tube daily  glucagon  Injectable 1 milliGRAM(s) IntraMuscular once  heparin   Injectable 5000 Unit(s) SubCutaneous every 12 hours  hydrALAZINE 50 milliGRAM(s) Oral three times a day  insulin glargine Injectable (LANTUS) 12 Unit(s) SubCutaneous at bedtime  insulin lispro (ADMELOG) corrective regimen sliding scale   SubCutaneous every 6 hours  insulin lispro Injectable (ADMELOG) 6 Unit(s) SubCutaneous every 6 hours  labetalol 100 milliGRAM(s) Oral two times a day  Nephro-noam 1 Tablet(s) Oral daily  pantoprazole   Suspension 40 milliGRAM(s) Oral before breakfast  piperacillin/tazobactam IVPB.. 3.375 Gram(s) IV Intermittent every 12 hours  sodium chloride 0.9% for Nebulization 3 milliLiter(s) Nebulizer every 6 hours    MEDICATIONS  (PRN):  dextrose Oral Gel 15 Gram(s) Oral once PRN Blood Glucose LESS THAN 70 milliGRAM(s)/deciliter  sodium chloride 0.9% Bolus. 100 milliLiter(s) IV Bolus every 5 minutes PRN SBP LESS THAN or EQUAL to 80 mmHg      Allergies    No Known Allergies    Intolerances      Review of Systems:    UNABLE TO OBTAIN    PHYSICAL EXAM:  VITALS: T(C): 37.2 (03-13-25 @ 08:00)  T(F): 98.9 (03-13-25 @ 08:00), Max: 100 (03-12-25 @ 21:37)  HR: 89 (03-13-25 @ 11:31) (85 - 110)  BP: 190/65 (03-13-25 @ 08:00) (158/68 - 190/65)  RR:  (16 - 24)  SpO2:  (98% - 100%)  Wt(kg): --  GENERAL: NAD  EYES: No proptosis  RESPIRATORY: non labored breathing- trach/ventilator  CARDIOVASCULAR: Regular rate and rhythm  GI: PEG on TF- tolerating bolus feeds      CAPILLARY BLOOD GLUCOSE      POCT Blood Glucose.: 210 mg/dL (13 Mar 2025 11:10)  POCT Blood Glucose.: 241 mg/dL (13 Mar 2025 05:06)  POCT Blood Glucose.: 190 mg/dL (12 Mar 2025 23:35)  POCT Blood Glucose.: 173 mg/dL (12 Mar 2025 17:37)      03-13    135  |  93[L]  |  40[H]  ----------------------------<  212[H]  3.3[L]   |  27  |  2.38[H]    eGFR: 28[L]    Ca    9.7      03-13  Mg     2.50     03-13  Phos  2.5     03-13            Thyroid Function Tests:        A1C with Estimated Average Glucose Result: 6.4 % (02-19-25 @ 06:45)  A1C with Estimated Average Glucose Result: 4.8 % (10-05-24 @ 08:37)  A1C with Estimated Average Glucose Result: 5.9 % (07-16-24 @ 10:25)  A1C with Estimated Average Glucose Result: 6.9 % (04-30-24 @ 06:03)          Diet, NPO with Tube Feed:   Tube Feeding Modality: Gastrostomy  Nepro with Carb Steady (NEPRORTH)  Total Volume for 24 Hours (mL): 1160  Bolus  Total Volume of Bolus (mL):  290  Total # of Feeds: 4  Tube Feed Frequency: Every 6 hours   Tube Feed Start Time: 12:00  Bolus Feed Rate (mL per Hour): 290   Bolus Feed Duration (in Hours): 0  Liquid Protein Supplement     Qty per Day:  1 (02-25-25 @ 13:43)

## 2025-03-13 NOTE — PROGRESS NOTE ADULT - NS ATTEND AMEND GEN_ALL_CORE FT
74M w/ MHx ESRD on HD via fistula, HTN, DMII, and recent prolonged hospitalization (4-6/2024) for baclofen toxicity and acute ischemic CVA of the left talya/cerebellum c/b acute hypoxemic and hypercapnic respiratory failure s/p ETT intubation/MV with failure to wean from ventilator s/p tracheostomy with hospital course further complicated by aspiration and B cereus bacteremia and RLE DVT followed by citrobacter PNA, GIB i/s/o AOCD, and fistula stenosis s/p fistulogram 6/4 ultimately transferred to acute rehab, decannulated, and discharged home until 10/2024 when pt returned to OSH with aspiration PNA c/b hypoxemic respiratory failure requiring ETT intubation/MV followed by tracheostomy placement and d/c to LTCF ([facility]) 11/2024 now presenting to [facility] on 2/18/25 for RUE fistulogram/angioplasty with vascular sx but with hospital course c/b concern fo recurrent PNA with uncontrolled hyperglycemia admitted to RCU for further medical management.    Per vascular no further intervention, no need to be on full a/c 2/2 to fistula bleeding. Not tolerating full a/c.     Completed course of abx but now with fungemia, MRSE bactermia and enteroccus bacteremia. CT with dense consolidation.   Pending repeat Wound care eval.       # Encephalopathy 2/2 CVA  # Acute on chronic hypoxemic and hypercapnic respiratory failure. S/P Trach now x2.   # Mucus plugging, atelecatasis  # oropharyngeal dysphagia   # GIB  # Bleeding AV fistula  # pressure ulcer.   # ESRD on HD  # DVT  # C. auris fungemia  # Bacteremia.   - CVA with recent strokes, c/w statins, unable to tolerate full a/c  - c/w vent, previously decannulated now retrached. Unclear if can come off vent. C/W albuteral and NS nebs. PS as tolerated.   - C/W peg tube feeds  - No longer with GIB, monitor CBC  - Bleeding AV fistula, per vascular no acute interventions are planned, Recommending against full a/c. Not tolerating 2/2 to bleeding.   - Wound care for pressure ulcers-  - Per vascular DVT likely chronic,  - HD per renal  - Fevers, s/p cultures now with fungemia, bacteremia. Will repeat TTE. Check repeat culture for clearance. Start caspo, vanc, zosyn. F/U further ID recs.   - DVT ppx- SQH  - Dispo- Full code. Prognosis overall is poor. Update wife via phone.

## 2025-03-13 NOTE — PROVIDER CONTACT NOTE (OTHER) - NAME OF MD/NP/PA/DO NOTIFIED:
AnthonyHU
Gavin Brown
Adams Rowley
Carmelina SIEGEL
Aslhie Sharpe
Roe leyva
Shakeel Amado
temp being 99.1. no new orders. bindhu
Carmelina Garcia
Carmelina Garcia
temp 101.4
Annemarie Dailey
Carmelina Garcia

## 2025-03-13 NOTE — PROVIDER CONTACT NOTE (OTHER) - REASON
/76 rechecked 165/73
pt desaturate to 83%
Delay PTT
Pt /63, no signs of chest pain/discomfort/acute distress noted
High BP of 202/73 (107) at 0520
Hypertension
PT TEMP IS 99.6
Positive blood culture
temp being 99.1. no new orders.
ROBBIN SLAEEM
Pt axillary temp 100.3. Pt warm to touch, with HR sustaining in low 100s. No acute distress noted.
High BP
Temp of 100.3 rectally at 0010

## 2025-03-13 NOTE — ADVANCED PRACTICE NURSE CONSULT - ASSESSMENT
General: A&Ox1, bedbound, turned with 2 assist, incontinent of stool and urine, condom catheter in place. Skin warm, dry with increased moisture in intertriginous folds, adequate skin turgor, scattered areas of hyperpigmentation and hypopigmentation, scattered areas of ecchymosis (without hematoma) on bilateral upper extremities. Blanchable erythema on bilateral heels. +tracheostomy, peristomal skin intact, with increased moisture. PEG with peritubular skin intact. Feedings paused for assessment. Right hip with hyperpigmentation.    Sacrum: stage 2 pressure injury 2.5lef1joh2.2cm (previously 0nws5urp3.2cm), 100% pink moist dermis, small serosanguinous drainage, no odor. Reepithelialization at wound edges. Periwound with no erythema, no increased warmth, no edema, no induration, no fluctuance no signs or symptoms of overt skin infection. Goals of care: offload pressure, protect from friction and shear, monitor for tissue type changes.     Bilateral ischium with ulcers, initially presented as stage 2 pressure injuries, deteriorated 2/2 critical illness (skin failure markers; hypoperfusion, mechanical ventilation >72 hours, severe protein calorie malnutrition), now improving in size:  ---Left ischium, presents as unstageable pressure injury, 2.1szx8ofv1.3cm (previously 2.5cmx1.5cmx0.3cm), 80% granulation tissue, 20% fibrinous slough, scant serosanguinous drainage, no odor. Periwound with no erythema, no increased warmth, no edema, no induration, no fluctuance no signs or symptoms of overt skin infection.   ---Right ischium, presents  as unstageable pressure injury, 1.5cmx1.5cmx0.2cm (previously 1cmx1.5cmx0.2cm), 50% yellow firmly adhered slough, 50% soft brown eschar, Reepithelialization at wound edges. No drainage, no odor. Periwound with no erythema, no increased warmth, no edema, no induration, no fluctuance no signs or symptoms of overt skin infection.   --- Goals of care: autolytic debridement, offload pressure, protect from friction and shear, monitor for tissue type changes.     Patient continues to be critically ill- at high risk for skin breakdown. On assessment patient on a low air loss surface, heel offloading boots in place, Z-flow positioning pillow utilized, moisture management in place with use of one breathable incontinence pad. Turned and positioned per protocol.

## 2025-03-13 NOTE — CONSULT NOTE ADULT - ASSESSMENT
74y male with a past medical history/ocular history of  trach/vent (last changed 10/23/24), CVA x2 with residual R hemiplegia, COPD, ESRD on HD MWF (last 2/17), pressure ulcer presenting to [facility] ED for leukocytosis consulted for r/o endophthalmitis in the setting of +candida auris cultures.    #R/o endophthalmitis  - STEF VA 2/2 mental status  - No APD, IOP wnl  - Anterior exam unremarkable  - Fundus exam without evidence of endophthalmitis  - Ophthalmology signing off at this time, reconsult if any changes    Outpatient Follow-up: Patient should follow-up with his/her ophthalmologist or with [facility] Department of Ophthalmology within 1 week of after discharge at:    [address]  [place] [address]  [phone]    Kenton Yañez MD, PGY-3  Also available on [employer] Teams     74y male with a past medical history/ocular history of  trach/vent (last changed 10/23/24), CVA x2 with residual R hemiplegia, COPD, ESRD on HD MWF (last 2/17), pressure ulcer presenting to [facility] ED for leukocytosis consulted for r/o endophthalmitis in the setting of +candida auris cultures.    #R/o endophthalmitis  - STEF VA 2/2 mental status  - No APD, IOP wnl  - Anterior exam unremarkable  - Fundus exam without evidence of endophthalmitis however limited peripheral view 2/2 lack of cooperation due to mental status  - Nerve is sharp and macula is flat with no vitritis or retina lesions seen on exam today  - Ophthalmology signing off at this time, reconsult if any changes    Outpatient Follow-up: Patient should follow-up with his/her ophthalmologist or with [facility] Department of Ophthalmology within 1 week of after discharge at:    [address]  [place], [place] [address] [phone]    Kenton Yañez MD, PGY-3  Also available on [employer] Teams

## 2025-03-13 NOTE — PROVIDER CONTACT NOTE (OTHER) - ASSESSMENT
Pt Vital signs are within normal range
Pt axillary temp 100.3. VS as per flow sheet. Pt warm to touch, with HR sustaining in low 100s. No acute distress noted.
pt temp is 99.1
Pt BP at 0400 was 188/76 then rechecked at 0600 165/73 no signs of acute distress noted.  T 97.0, P 72, R 16, O2 sat 100%.
High BP of 202/73 (107) at 0520. Pt unable to provide subjective symptoms
Pt /63, no signs of chest pain/discomfort/acute distress noted
pt axillary temp was 101.4 and rectal temp was 101.8
/63
Temp of 100.3 rectally, patient warm to touch. unable to acquire subjective symptoms
PT TEMP IS 99.6
Patient has high /66
pt desaturate to 83%
IV Argatroban was found not connect to the patient.

## 2025-03-13 NOTE — PROGRESS NOTE ADULT - ASSESSMENT
74-year-old male hx trach/vent - , CVA, COPD, stage renal disease on dialysis 4 times a week (MTRF) history of pressure ulcer.  Patient presents the ED today for elevated white count. nephrology consulted for dialysis needs    ESRD:  from [facility]  On HD MTTsF via an A-V fistula. s/p fistulogram by vascular 2/18  Continue MWF in hospital  Prolonged bleeding post HD -- vascular consulted. no plan for intervention  s/p hd 3/12 uf 2L hd tmr  consent from wife in dialysis unit.  monitor bmp    Hypertension  BP uncontrolled  continue norvasc 10 daily and hydralazine increased to 50 tid 3/2  titrate hydralazine to 100mg TID  max uf as tolerated  monitor BP    Anemia:  s/p 1 unit PRBC 2/23  epo with hd  iron sat >20  monitor Hgb  Transfuse for Hg < 7.    leukocytosis  sepsis work up per team  On Zosyn.    hyponatremia  in setting of esrd and hyperglycemia  optimize dm control  hd per schedule with UF  monitor Na    Hypokalemia  Dialyze w/ 4K bath   supplement kcl 40meq if k<3.4  Monitor K.    Hypermagnesemia  HD per schedule.  Monitor Mg.    CKD - MBD:  PTH 25 - low for CKD stage.  Received PhosNaK x1 on 3/8.  PO4 improved.  Monitor PO4 and Ca daily.

## 2025-03-13 NOTE — PROVIDER CONTACT NOTE (OTHER) - ACTION/TREATMENT ORDERED:
Provider notified, morning hydralazine and amlodipine given. Continue to monitor
ROBBIN SALEEM ordered sputum culture, RVP FLU panel, MB lab, blood culutures and chest xray.
MD cantrell made aware of temp being 99.1. no new orders.
x-ray ordered, ultrasound done at bedside
Hydralazine 2.5mG IVP.
PTT to be drawn at 22:00.
Give 6 am BP medications early
Provider made aware, Provider ordered Ofirmev 1000mg and AM blood cultures.
Provider made aware. AM BP meds given. Will reassess BP one hour post.
Verbal order: will restart pt on BP med
mD cantrell said to retake temp  in one hour.
Verbal order to repeat 2 sets of blood culture
provider notified, tylenol ordered

## 2025-03-13 NOTE — PROGRESS NOTE ADULT - ASSESSMENT
75 y/o M PMhx trach/vent (last changed 10/23/24), CVA x2 with residual R hemiplegia, COPD, ESRD on HD MWF who presented to ED for leukocytosis when initially planned for fistulogram and noted to have a WBC of 18.   he was treated for E faecium UTI, and  pseudomonas VAP s/p cefepime, completed 2/25  he developed fever and was started on zosyn  bronch cx grew pseudomonas    E faecium bacteremia, candidemia  blood cx from 3/12- PCR positive for candida auris and E faecium, MRSE  - no Van genes detected for vancomycin resistance by PCR  CT C/A/P- Multifocal pneumonia. Fluid and debris in the trachea and bronchi, left greater than right. Aspiration is a consideration. Infiltration of the soft tissues posterior to the bilateral ischia and right greater trochanter, without evidence of underlying drainable fluid collection.    pseudomonas PNA- treated  trach aspirate  pseudomonas  s/p cefepime x 7 days, completed 2/25  s/p zosyn x 7 days, completed 3/10    DVT  RUE US- age-indeterminate, non-occlusive deep vein thrombosis noted in the right brachial vein  on eliquis    ESRD  HD per nephrology    Recommendations  c/w zosyn for now  start caspofungin  start vancomycin  check TTE  repeat blood cultures tomorrow  eventual optho eval to r/o endophthalmitis  aspiration precautions  contact isolation for candida auris    Steven Torres M.D.  [facility]  [phone]  After 5pm on weekdays and all day on weekends - please call [phone]  Available on [employer] teams    Thank you for consulting us and involving us in the management of this patients case. In addition to reviewing history, imaging, documents, labs, microbiology, took into account antibiotic stewardship, local antibiogram and infection control strategies and potential transmission issues.

## 2025-03-13 NOTE — PROGRESS NOTE ADULT - SUBJECTIVE AND OBJECTIVE BOX
CHIEF COMPLAINT: Patient is a 74y old  Male who presents with a chief complaint of 2/18/25 was in cath lab earlier today with vascular surgery for fistulogram and noted to have a WBC of 18 and sent to ED and admitted (04 Mar 2025 12:22)      INTERVAL EVENTS:   - No acute overnight events, VSS, afebrile on ventilator   - CT C/A/P with LLL opacification, small b/l pleff, infiltration of skin and sub tissue adjacent to R femur  - Continue on IV Zosyn, recent BCx with Gram+ Cocci, repeat 2 sets of cultures discussed with ID     ROS: Seen by bedside during AM rounds     OBJECTIVE:  ICU Vital Signs Last 24 Hrs  T(C): 37.2 (13 Mar 2025 04:16), Max: 37.8 (12 Mar 2025 21:37)  T(F): 99 (13 Mar 2025 04:16), Max: 100 (12 Mar 2025 21:37)  HR: 90 (13 Mar 2025 05:13) (80 - 110)  BP: 168/70 (13 Mar 2025 05:13) (157/55 - 184/89)  BP(mean): --  ABP: --  ABP(mean): --  RR: 18 (13 Mar 2025 04:16) (16 - 24)  SpO2: 100% (13 Mar 2025 04:16) (98% - 100%)    O2 Parameters below as of 13 Mar 2025 04:16  Patient On (Oxygen Delivery Method): ventilator    O2 Concentration (%): 40      Mode: AC/ CMV (Assist Control/ Continuous Mandatory Ventilation), RR (machine): 15, TV (machine): 400, FiO2: 40, PEEP: 6, ITime: 0.72, MAP: 12, PIP: 32    03-12 @ 07:01  -  03-13 @ 07:00  --------------------------------------------------------  IN: 1330 mL / OUT: 2400 mL / NET: -1070 mL      CAPILLARY BLOOD GLUCOSE      POCT Blood Glucose.: 241 mg/dL (13 Mar 2025 05:06)      PHYSICAL EXAM:  General:   HEENT:   Lymph Nodes:  Neck:   Respiratory:   Cardiovascular:   Abdomen:   Extremities:   Skin:   Neurological:  Psychiatry:    Mode: AC/ CMV (Assist Control/ Continuous Mandatory Ventilation)  RR (machine): 15  TV (machine): 400  FiO2: 40  PEEP: 6  ITime: 0.72  MAP: 12  PIP: 32      HOSPITAL MEDICATIONS:  MEDICATIONS  (STANDING):  albuterol    0.083% 2.5 milliGRAM(s) Nebulizer every 6 hours  amLODIPine   Tablet 10 milliGRAM(s) Oral daily  AQUAPHOR (petrolatum Ointment) 1 Application(s) Topical two times a day  atorvastatin 20 milliGRAM(s) Oral at bedtime  chlorhexidine 0.12% Liquid 15 milliLiter(s) Oral Mucosa every 12 hours  chlorhexidine 2% Cloths 1 Application(s) Topical daily  dextrose 5%. 1000 milliLiter(s) (100 mL/Hr) IV Continuous <Continuous>  dextrose 5%. 1000 milliLiter(s) (50 mL/Hr) IV Continuous <Continuous>  dextrose 50% Injectable 25 Gram(s) IV Push once  dextrose 50% Injectable 12.5 Gram(s) IV Push once  dextrose 50% Injectable 25 Gram(s) IV Push once  epoetin reilly-epbx (RETACRIT) Injectable 84920 Unit(s) IV Push <User Schedule>  ferrous    sulfate Liquid 300 milliGRAM(s) Enteral Tube daily  glucagon  Injectable 1 milliGRAM(s) IntraMuscular once  heparin   Injectable 5000 Unit(s) SubCutaneous every 12 hours  hydrALAZINE 50 milliGRAM(s) Oral three times a day  insulin glargine Injectable (LANTUS) 10 Unit(s) SubCutaneous at bedtime  insulin lispro (ADMELOG) corrective regimen sliding scale   SubCutaneous every 6 hours  insulin lispro Injectable (ADMELOG) 5 Unit(s) SubCutaneous every 6 hours  Nephro-noam 1 Tablet(s) Oral daily  pantoprazole   Suspension 40 milliGRAM(s) Oral before breakfast  piperacillin/tazobactam IVPB.. 3.375 Gram(s) IV Intermittent every 12 hours  sodium chloride 0.9% for Nebulization 3 milliLiter(s) Nebulizer every 6 hours    MEDICATIONS  (PRN):  dextrose Oral Gel 15 Gram(s) Oral once PRN Blood Glucose LESS THAN 70 milliGRAM(s)/deciliter  sodium chloride 0.9% Bolus. 100 milliLiter(s) IV Bolus every 5 minutes PRN SBP LESS THAN or EQUAL to 80 mmHg      LABS:                        7.7    18.65 )-----------( 326      ( 13 Mar 2025 06:30 )             25.1     03-12    131[L]  |  89[L]  |  48[H]  ----------------------------<  180[H]  3.9   |  26  |  3.35[H]    Ca    9.6      12 Mar 2025 04:53  Phos  3.6     03-12  Mg     2.80     03-12        Urinalysis Basic - ( 12 Mar 2025 04:53 )    Color: x / Appearance: x / SG: x / pH: x  Gluc: 180 mg/dL / Ketone: x  / Bili: x / Urobili: x   Blood: x / Protein: x / Nitrite: x   Leuk Esterase: x / RBC: x / WBC x   Sq Epi: x / Non Sq Epi: x / Bacteria: x         CHIEF COMPLAINT: Patient is a 74y old  Male who presents with a chief complaint of 2/18/25 was in cath lab earlier today with vascular surgery for fistulogram and noted to have a WBC of 18 and sent to ED and admitted (04 Mar 2025 12:22)      INTERVAL EVENTS:   - No acute overnight events, VSS, afebrile on ventilator   - CT C/A/P with MFPNA, small b/l pleff, and infiltration of skin and sub tissue adjacent to R femur  - Continue on IV Zosyn, recent BCx with Gram+ Cocci, repeat 2 sets of cultures discussed with ID     ROS: Seen by bedside during AM rounds     OBJECTIVE:  ICU Vital Signs Last 24 Hrs  T(C): 37.2 (13 Mar 2025 04:16), Max: 37.8 (12 Mar 2025 21:37)  T(F): 99 (13 Mar 2025 04:16), Max: 100 (12 Mar 2025 21:37)  HR: 90 (13 Mar 2025 05:13) (80 - 110)  BP: 168/70 (13 Mar 2025 05:13) (157/55 - 184/89)  BP(mean): --  ABP: --  ABP(mean): --  RR: 18 (13 Mar 2025 04:16) (16 - 24)  SpO2: 100% (13 Mar 2025 04:16) (98% - 100%)    O2 Parameters below as of 13 Mar 2025 04:16  Patient On (Oxygen Delivery Method): ventilator    O2 Concentration (%): 40      Mode: AC/ CMV (Assist Control/ Continuous Mandatory Ventilation), RR (machine): 15, TV (machine): 400, FiO2: 40, PEEP: 6, ITime: 0.72, MAP: 12, PIP: 32    03-12 @ 07:01  -  03-13 @ 07:00  --------------------------------------------------------  IN: 1330 mL / OUT: 2400 mL / NET: -1070 mL      CAPILLARY BLOOD GLUCOSE      POCT Blood Glucose.: 241 mg/dL (13 Mar 2025 05:06)      PHYSICAL EXAM:  General:   HEENT:   Lymph Nodes:  Neck:   Respiratory:   Cardiovascular:   Abdomen:   Extremities:   Skin:   Neurological:  Psychiatry:    Mode: AC/ CMV (Assist Control/ Continuous Mandatory Ventilation)  RR (machine): 15  TV (machine): 400  FiO2: 40  PEEP: 6  ITime: 0.72  MAP: 12  PIP: 32      HOSPITAL MEDICATIONS:  MEDICATIONS  (STANDING):  albuterol    0.083% 2.5 milliGRAM(s) Nebulizer every 6 hours  amLODIPine   Tablet 10 milliGRAM(s) Oral daily  AQUAPHOR (petrolatum Ointment) 1 Application(s) Topical two times a day  atorvastatin 20 milliGRAM(s) Oral at bedtime  chlorhexidine 0.12% Liquid 15 milliLiter(s) Oral Mucosa every 12 hours  chlorhexidine 2% Cloths 1 Application(s) Topical daily  dextrose 5%. 1000 milliLiter(s) (100 mL/Hr) IV Continuous <Continuous>  dextrose 5%. 1000 milliLiter(s) (50 mL/Hr) IV Continuous <Continuous>  dextrose 50% Injectable 25 Gram(s) IV Push once  dextrose 50% Injectable 12.5 Gram(s) IV Push once  dextrose 50% Injectable 25 Gram(s) IV Push once  epoetin reilly-epbx (RETACRIT) Injectable 99982 Unit(s) IV Push <User Schedule>  ferrous    sulfate Liquid 300 milliGRAM(s) Enteral Tube daily  glucagon  Injectable 1 milliGRAM(s) IntraMuscular once  heparin   Injectable 5000 Unit(s) SubCutaneous every 12 hours  hydrALAZINE 50 milliGRAM(s) Oral three times a day  insulin glargine Injectable (LANTUS) 10 Unit(s) SubCutaneous at bedtime  insulin lispro (ADMELOG) corrective regimen sliding scale   SubCutaneous every 6 hours  insulin lispro Injectable (ADMELOG) 5 Unit(s) SubCutaneous every 6 hours  Nephro-noam 1 Tablet(s) Oral daily  pantoprazole   Suspension 40 milliGRAM(s) Oral before breakfast  piperacillin/tazobactam IVPB.. 3.375 Gram(s) IV Intermittent every 12 hours  sodium chloride 0.9% for Nebulization 3 milliLiter(s) Nebulizer every 6 hours    MEDICATIONS  (PRN):  dextrose Oral Gel 15 Gram(s) Oral once PRN Blood Glucose LESS THAN 70 milliGRAM(s)/deciliter  sodium chloride 0.9% Bolus. 100 milliLiter(s) IV Bolus every 5 minutes PRN SBP LESS THAN or EQUAL to 80 mmHg      LABS:                        7.7    18.65 )-----------( 326      ( 13 Mar 2025 06:30 )             25.1     03-12    131[L]  |  89[L]  |  48[H]  ----------------------------<  180[H]  3.9   |  26  |  3.35[H]    Ca    9.6      12 Mar 2025 04:53  Phos  3.6     03-12  Mg     2.80     03-12        Urinalysis Basic - ( 12 Mar 2025 04:53 )    Color: x / Appearance: x / SG: x / pH: x  Gluc: 180 mg/dL / Ketone: x  / Bili: x / Urobili: x   Blood: x / Protein: x / Nitrite: x   Leuk Esterase: x / RBC: x / WBC x   Sq Epi: x / Non Sq Epi: x / Bacteria: x         CHIEF COMPLAINT: Patient is a 74y old  Male who presents with a chief complaint of 2/18/25 was in cath lab earlier today with vascular surgery for fistulogram and noted to have a WBC of 18 and sent to ED and admitted (04 Mar 2025 12:22)      INTERVAL EVENTS:   - No acute overnight events, VSS, afebrile on ventilator   - CT C/A/P with MFPNA, small b/l pleff, and infiltration of skin and sub tissue adjacent to R femur  - Continue on IV Zosyn, recent BCx with enterococcus, staph epi and candida auris - added Vancomycin and Caspo, rpt BCx x 2     ROS: Seen by bedside during AM rounds     OBJECTIVE:  ICU Vital Signs Last 24 Hrs  T(C): 37.2 (13 Mar 2025 04:16), Max: 37.8 (12 Mar 2025 21:37)  T(F): 99 (13 Mar 2025 04:16), Max: 100 (12 Mar 2025 21:37)  HR: 90 (13 Mar 2025 05:13) (80 - 110)  BP: 168/70 (13 Mar 2025 05:13) (157/55 - 184/89)  BP(mean): --  ABP: --  ABP(mean): --  RR: 18 (13 Mar 2025 04:16) (16 - 24)  SpO2: 100% (13 Mar 2025 04:16) (98% - 100%)    O2 Parameters below as of 13 Mar 2025 04:16  Patient On (Oxygen Delivery Method): ventilator    O2 Concentration (%): 40      Mode: AC/ CMV (Assist Control/ Continuous Mandatory Ventilation), RR (machine): 15, TV (machine): 400, FiO2: 40, PEEP: 6, ITime: 0.72, MAP: 12, PIP: 32    03-12 @ 07:01  -  03-13 @ 07:00  --------------------------------------------------------  IN: 1330 mL / OUT: 2400 mL / NET: -1070 mL      CAPILLARY BLOOD GLUCOSE      POCT Blood Glucose.: 241 mg/dL (13 Mar 2025 05:06)      PHYSICAL EXAM:  General: NAD  Neck: neck supple, no JVD, tracheostomy tube is midline  Respiratory: +trach to ventilator on PS, +coarse breath sounds b/l, no wheezing, normal respiratory effort  Cardiovascular: normal s1, s2, RRR  Abdomen: soft, non tender, +PEG  Extremities: no LE edema b/l  Skin: warm, dry  Neurological: Awake, eyes open spontaneously, tracks with eyes, follows commands   Psychiatry: no agitation      Mode: AC/ CMV (Assist Control/ Continuous Mandatory Ventilation)  RR (machine): 15  TV (machine): 400  FiO2: 40  PEEP: 6  ITime: 0.72  MAP: 12  PIP: 32      HOSPITAL MEDICATIONS:  MEDICATIONS  (STANDING):  albuterol    0.083% 2.5 milliGRAM(s) Nebulizer every 6 hours  amLODIPine   Tablet 10 milliGRAM(s) Oral daily  AQUAPHOR (petrolatum Ointment) 1 Application(s) Topical two times a day  atorvastatin 20 milliGRAM(s) Oral at bedtime  chlorhexidine 0.12% Liquid 15 milliLiter(s) Oral Mucosa every 12 hours  chlorhexidine 2% Cloths 1 Application(s) Topical daily  dextrose 5%. 1000 milliLiter(s) (100 mL/Hr) IV Continuous <Continuous>  dextrose 5%. 1000 milliLiter(s) (50 mL/Hr) IV Continuous <Continuous>  dextrose 50% Injectable 25 Gram(s) IV Push once  dextrose 50% Injectable 12.5 Gram(s) IV Push once  dextrose 50% Injectable 25 Gram(s) IV Push once  epoetin reilly-epbx (RETACRIT) Injectable 66822 Unit(s) IV Push <User Schedule>  ferrous    sulfate Liquid 300 milliGRAM(s) Enteral Tube daily  glucagon  Injectable 1 milliGRAM(s) IntraMuscular once  heparin   Injectable 5000 Unit(s) SubCutaneous every 12 hours  hydrALAZINE 50 milliGRAM(s) Oral three times a day  insulin glargine Injectable (LANTUS) 10 Unit(s) SubCutaneous at bedtime  insulin lispro (ADMELOG) corrective regimen sliding scale   SubCutaneous every 6 hours  insulin lispro Injectable (ADMELOG) 5 Unit(s) SubCutaneous every 6 hours  Nephro-noam 1 Tablet(s) Oral daily  pantoprazole   Suspension 40 milliGRAM(s) Oral before breakfast  piperacillin/tazobactam IVPB.. 3.375 Gram(s) IV Intermittent every 12 hours  sodium chloride 0.9% for Nebulization 3 milliLiter(s) Nebulizer every 6 hours    MEDICATIONS  (PRN):  dextrose Oral Gel 15 Gram(s) Oral once PRN Blood Glucose LESS THAN 70 milliGRAM(s)/deciliter  sodium chloride 0.9% Bolus. 100 milliLiter(s) IV Bolus every 5 minutes PRN SBP LESS THAN or EQUAL to 80 mmHg      LABS:                        7.7    18.65 )-----------( 326      ( 13 Mar 2025 06:30 )             25.1     03-12    131[L]  |  89[L]  |  48[H]  ----------------------------<  180[H]  3.9   |  26  |  3.35[H]    Ca    9.6      12 Mar 2025 04:53  Phos  3.6     03-12  Mg     2.80     03-12        Urinalysis Basic - ( 12 Mar 2025 04:53 )    Color: x / Appearance: x / SG: x / pH: x  Gluc: 180 mg/dL / Ketone: x  / Bili: x / Urobili: x   Blood: x / Protein: x / Nitrite: x   Leuk Esterase: x / RBC: x / WBC x   Sq Epi: x / Non Sq Epi: x / Bacteria: x

## 2025-03-13 NOTE — PROVIDER CONTACT NOTE (OTHER) - SITUATION
Patient has high /66
Pt /63, no signs of chest pain/discomfort/acute distress noted
Temp of 100.3 rectally
pt axillary temp was 101.4 and rectal temp was 101.8
Pt blood culture came back positive for growth anaerobic gram+ cocci with clusters
pt desaturate to 83%
High BP of 202/73 (107) at 0520
IV Argatroban was found not connect to the patient. IV Argatroban connected and started around 20:00.
PT TEMP IS 99.6
Pt BP at 0400 was 188/76 then rechecked at 0600 165/73 no signs of acute distress noted.  T 97.0, P 72, R 16, O2 sat 100%.
PT temp is 99.1 went down from 99.6
/63
Pt axillary temp 100.3. Pt warm to touch, with HR sustaining in low 100s. No acute distress noted.

## 2025-03-13 NOTE — PROGRESS NOTE ADULT - ASSESSMENT
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 Patent recently admitted in 6/2024 for left pontine and cerebellar CVA requiring tracheostomy. While at [facility] patient decannulated and passed SS with advanced diet to easy to chew with thin liquids, but complicated COVID requiring transfer to [facility] in 7/2024 and then ultimately discharged home. Per wife patient was doing well at home until 10/2024 when he was found with RLL with concern for aspiration requiring intubation, then tracheostomy, and ultimately discharged to NH with vent dependence in 11/2024. Patient now presented for RUE fistulogram and angioplasty with vascular, however course complicated by leukocytosis with concern for recurrent trachitis vs PNA and UTI, AOCD and hyperglycemia. Admitted to RCU for further management. Found with  PSA trachitis/ PNA and enterococcus faecium UTI. Course complicated by RUE brachial DVT and hypoxia with HD with concern for volume down vs PE? Volume removal held and hypoxia improved. AC given empirically and CTA CHEST with no PE. Case discussed with vascular and since brachial DVT appears old no need for chronic AC . Course further complicated by PSA LLL PNA and continued on zosyn.     NEUROLOGY  # Poor mentation second to metabolic encephalopathy vs psychosomatic/ depression   - Hx of L cerebellar and pontene embolic CVA x 2 with residual R hemiplegia   - AOx0, bedbound, and nonverbal at baseline per report, however per exam patient able to open eyes, able to follow commands on left (LUE>LLE) with SEVERE weakness, however noted with R hemiplegia per baseline  - Nods yes and no occasionally however keeps eyes closed likely psychosomatic vs metabolic encephalopathy with infections?   - Last CTH in 10/2024 with no acute findings   - Hold RPT CTH for now   - Monitor mentation     CARDIOVASCULAR  # Hx of HTN and HLD  - Continue on hydral 50 (increased 3/2)   - Continue on norvasc 10   - Monitor BP     # Incidental Pericardial effusion   - Incidental small pericardial effusion seen adjacent to right ventricle, however IVC appears flat and LE without edema with low concern for RV failure.   - Prior TTE reviewed from 4/2024 with normal RVLVSF with no pericardial effusion noted at the time.   - TTE 2/23 with EF 65, normal LVRVSF, mild LAD, normal RA, mild pulmonary hypertension, and small pericardial effusion noted adjacent to the anterior right ventricle with no echocardiographic evidence of tamponade  - Monitor hemodynamics     RESPIRATORY  # Respiratory failure second to PNA vs volume down vs PE?   - Hx of COPD with chronic respiratory failure with tracheostomy and vent dependence  - Admitted for angioplasty of RUE AVF, however course complicated by leukocytosis with concern for recurrent trachitis/ PNA.   - Course complicated by hypoxia during HD likely volume down vs migration of RUE brachial DVT with PE?   - Held volume removal, bolus given, and hypoxia improved.   - CTA CHEST without PE  - Course complicated by LLL mucous plug and IPV started with improvement   - Continue on albuterol, NS 0.9 and IPV  - Continue on vent 15/400/6/40    HEENT   # Hemoptysis   - Wife reported hemoptysis while at nursing home 2 weeks prior to admission   - No hemoptysis noted on admission   - Hemoptysis returned in house   - CTA CHEST 2/24 without PE  - Bronch 2/26 with no evidence of bleeding   - Bleeding resolved     GI  # Dysphagia with PEG   - Passed SS with advanced diet to easy to chew with thin liquids in 7/2024, however since recurrent aspiration in 10/2024 now with PEG/ vent dependence   - Continue on PEG TF and changed to bolus feeds   - Monitor tolerance     RENAL  # ESRD on HD   # Dehydration   - Resumed on HD with no flow issue from AVF   - Course complicated by hypoxia with concern for volume down given small IVC and elevated lactate on 2/21  - Volume removal held, lactate improved, and hypoxia improved.   - Continue on HD MWF in house per renal   - Return to HD MTRF outpatient   - Monitor renal function, electrolytes and UOP     # AVF trouble   # AVF oozing   - Presented for RUE fistulogram and angioplasty with vascular on 2/18   - Oozing from AVF post HD, surgicell placed, and AC held with improvement.  - Discussed with vascular and no need for AC given DVT as below is chronic   - RPT DOPPLER with again noted DVT in right stented brachial vein, however als noted with significantly elevated flow velocities with turbulence at the anastomotic site. Wall thickening noted at the proximal segment of the outflow vein, which is otherwise patent. Distal segment of the outflow vein appears patent without thrombosis evidence.   - Discussed with vascular and given patent AVF with no HD trouble no need for fistulogram at this time.     # Hyponatremia likely volume down   - Concern for volume issues, however overall appears volume down with serum osmo 307  - Sodium 128 and improved with HD/ bolus during HD.   - Trend sodium with HD for now     # Elevated lactate   - Lactate elevated likely second to volume down?   - Post bolus lactate trending down from 3.3 to 2.7 to 1.3    INFECTIOUS DISEASE  # Recurrent LLL PNA   - CXR with LLL mucous plug   - POCUS with LLL consolidation vs atelectasis   - Recent BAL with PSA as below   - FULL RVP negative  - SCx with PSA  - Fevers returned and zosyn (3/3 - 3/11), last dose 3/11  - WBC rising and low grade fever overnight; Discussed with ID, recommended CT chest, abdomen and pelvis  - Restarted Zosyn empirically (3/12 -)  - 3/13 CT C/A/P with LLL opacification, small b/l pleff, infiltration of skin and sub tissue adjacent to R femur  - Continue on IV Zosyn, recent BCx with Gram+ Cocci, repeat 2 sets of cultures discussed with ID     # Hemoptysis   - Hemoptysis as above   - BAL with PSA as prior   - No fever and monitor off ABX     # Trachitis vs PNA/ UTI   - Hx of steno and PSA in sputum   - Flu/ COVID negative   - MRSA negative   - UCx with enterococcus   - SCx with  PSA   - Found with leukocytosis and started on cefepime and christiano (2/19) and vanco on HD days (2/21).   - No further steno seen in sputum and continue on cefepime (2/19 - 2/25) and vanco on HD days (2/21, 2/22 - 2/25)   - WBC increased likely reactive to hypoxic event and now improving  - ID following     # PPX   - MRSA PCR negative  - Candida auris PCR POSITIVE  - Continue on isolation precautions per IC    HEME  # AOCD   - Presented for angioplasty and found with anemia to 6.7 s/p 1U PRBC 2/19 and 1/2 PRBC 2/23   - Anemia panel with concern for AOCD   - Continue on EPO QIW and Fe   - Monitor HH     # Heparin resistance?   - PTT persistently low despite increasing heparin GTT   - Resume on heparin GTT and anti Xa level supra therapeutic on but PTT remained low  - Patient ultimately with concern for heparin resistance    VASCULAR  # RUE DVT   - RUE edema noted with age-indeterminate, non-occlusive deep vein thrombosis noted in the right brachial vein.   - Case discussed with vascular who recommends full AC   - CTH from prior with encephalomalcia, however no hx of intracranial bleed   - Prior UGIB while on AC, however discharged on eliquis in 7/2024 and completed 6 month course for RLE DVT   - Continue on heparin GTT, however held for hemoptysis and resistance   - Continued on argatroban with good tolerance and then changed to eliquis   - Case discussed vascular and DVT likely chronic and no need for eliquis     ENDOCRINE  # DM2 A1C 14.0 (1/2024) > 6.4 (2/2025)  - Presented with hyperglycemia and continued on lantus and ISS   - Increased lantus, however FS continues to trend in 200s and changed to NPH with improvement.  - TF interrupted for bronchoscopy and adjustment to bolus feeds and FS dropped.   - NPH stopped and FS improved, however trended back up with continuation of tube feeds.   - NPH resumed, however FS continues to wax and wane and case discussed with endocrine   - morning hypoglycemic episode (3/11), changed lantus to 8 per endocrine. Continue with lispro 5 with ISS for now.   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 Patent recently admitted in 6/2024 for left pontine and cerebellar CVA requiring tracheostomy. While at [facility] patient decannulated and passed SS with advanced diet to easy to chew with thin liquids, but complicated COVID requiring transfer to [facility] in 7/2024 and then ultimately discharged home. Per wife patient was doing well at home until 10/2024 when he was found with RLL with concern for aspiration requiring intubation, then tracheostomy, and ultimately discharged to NH with vent dependence in 11/2024. Patient now presented for RUE fistulogram and angioplasty with vascular, however course complicated by leukocytosis with concern for recurrent trachitis vs PNA and UTI, AOCD and hyperglycemia. Admitted to RCU for further management. Found with  PSA trachitis/ PNA and enterococcus faecium UTI. Course complicated by RUE brachial DVT and hypoxia with HD with concern for volume down vs PE? Volume removal held and hypoxia improved. AC given empirically and CTA CHEST with no PE. Case discussed with vascular and since brachial DVT appears old no need for chronic AC . Course further complicated by PSA LLL PNA and continued on zosyn.     NEUROLOGY  # Poor mentation second to metabolic encephalopathy vs psychosomatic/ depression   - Hx of L cerebellar and pontene embolic CVA x 2 with residual R hemiplegia   - AOx0, bedbound, and nonverbal at baseline per report, however per exam patient able to open eyes, able to follow commands on left (LUE>LLE) with SEVERE weakness, however noted with R hemiplegia per baseline  - Nods yes and no occasionally however keeps eyes closed likely psychosomatic vs metabolic encephalopathy with infections?   - Last CTH in 10/2024 with no acute findings   - Hold RPT CTH for now   - Monitor mentation     CARDIOVASCULAR  # Hx of HTN and HLD  - Continue on hydral 50 (increased 3/2)   - Continue on norvasc 10   - Monitor BP     # Incidental Pericardial effusion   - Incidental small pericardial effusion seen adjacent to right ventricle, however IVC appears flat and LE without edema with low concern for RV failure.   - Prior TTE reviewed from 4/2024 with normal RVLVSF with no pericardial effusion noted at the time.   - TTE 2/23 with EF 65, normal LVRVSF, mild LAD, normal RA, mild pulmonary hypertension, and small pericardial effusion noted adjacent to the anterior right ventricle with no echocardiographic evidence of tamponade  - Monitor hemodynamics     RESPIRATORY  # Respiratory failure second to PNA vs volume down vs PE?   - Hx of COPD with chronic respiratory failure with tracheostomy and vent dependence  - Admitted for angioplasty of RUE AVF, however course complicated by leukocytosis with concern for recurrent trachitis/ PNA.   - Course complicated by hypoxia during HD likely volume down vs migration of RUE brachial DVT with PE?   - Held volume removal, bolus given, and hypoxia improved.   - CTA CHEST without PE  - Course complicated by LLL mucous plug and IPV started with improvement   - Continue on albuterol, NS 0.9 and IPV  - Continue on vent 15/400/6/40    HEENT   # Hemoptysis   - Wife reported hemoptysis while at nursing home 2 weeks prior to admission   - No hemoptysis noted on admission   - Hemoptysis returned in house   - CTA CHEST 2/24 without PE  - Bronch 2/26 with no evidence of bleeding   - Bleeding resolved     GI  # Dysphagia with PEG   - Passed SS with advanced diet to easy to chew with thin liquids in 7/2024, however since recurrent aspiration in 10/2024 now with PEG/ vent dependence   - Continue on PEG TF and changed to bolus feeds   - Monitor tolerance     RENAL  # ESRD on HD   # Dehydration   - Resumed on HD with no flow issue from AVF   - Course complicated by hypoxia with concern for volume down given small IVC and elevated lactate on 2/21  - Volume removal held, lactate improved, and hypoxia improved.   - Continue on HD MWF in house per renal   - Return to HD MTRF outpatient   - Monitor renal function, electrolytes and UOP     # AVF trouble   # AVF oozing   - Presented for RUE fistulogram and angioplasty with vascular on 2/18   - Oozing from AVF post HD, surgicell placed, and AC held with improvement.  - Discussed with vascular and no need for AC given DVT as below is chronic   - RPT DOPPLER with again noted DVT in right stented brachial vein, however als noted with significantly elevated flow velocities with turbulence at the anastomotic site. Wall thickening noted at the proximal segment of the outflow vein, which is otherwise patent. Distal segment of the outflow vein appears patent without thrombosis evidence.   - Discussed with vascular and given patent AVF with no HD trouble no need for fistulogram at this time.     # Hyponatremia likely volume down   - Concern for volume issues, however overall appears volume down with serum osmo 307  - Sodium 128 and improved with HD/ bolus during HD.   - Trend sodium with HD for now     # Elevated lactate   - Lactate elevated likely second to volume down?   - Post bolus lactate trending down from 3.3 to 2.7 to 1.3    INFECTIOUS DISEASE  # Recurrent LLL PNA   - CXR with LLL mucous plug   - POCUS with LLL consolidation vs atelectasis   - Recent BAL with PSA as below   - FULL RVP negative  - SCx with PSA  - Fevers returned and zosyn (3/3 - 3/11), last dose 3/11  - WBC rising and low grade fever overnight; Discussed with ID, recommended CT chest, abdomen and pelvis  - Restarted Zosyn empirically (3/12 -)  - 3/13 - CT C/A/P with MFPNA, small b/l pleff, and infiltration of skin and sub tissue adjacent to R femur  - Continue on IV Zosyn, recent BCx with Gram+ Cocci, repeat 2 sets of cultures discussed with ID     # Hemoptysis   - Hemoptysis as above   - BAL with PSA as prior   - No fever and monitor off ABX     # Trachitis vs PNA/ UTI   - Hx of steno and PSA in sputum   - Flu/ COVID negative   - MRSA negative   - UCx with enterococcus   - SCx with  PSA   - Found with leukocytosis and started on cefepime and christiano (2/19) and vanco on HD days (2/21).   - No further steno seen in sputum and continue on cefepime (2/19 - 2/25) and vanco on HD days (2/21, 2/22 - 2/25)   - WBC increased likely reactive to hypoxic event and now improving  - ID following     # PPX   - MRSA PCR negative  - Candida auris PCR POSITIVE  - Continue on isolation precautions per IC    HEME  # AOCD   - Presented for angioplasty and found with anemia to 6.7 s/p 1U PRBC 2/19 and 1/2 PRBC 2/23   - Anemia panel with concern for AOCD   - Continue on EPO QIW and Fe   - Monitor HH     # Heparin resistance?   - PTT persistently low despite increasing heparin GTT   - Resume on heparin GTT and anti Xa level supra therapeutic on but PTT remained low  - Patient ultimately with concern for heparin resistance    VASCULAR  # RUE DVT   - RUE edema noted with age-indeterminate, non-occlusive deep vein thrombosis noted in the right brachial vein.   - Case discussed with vascular who recommends full AC   - CTH from prior with encephalomalcia, however no hx of intracranial bleed   - Prior UGIB while on AC, however discharged on eliquis in 7/2024 and completed 6 month course for RLE DVT   - Continue on heparin GTT, however held for hemoptysis and resistance   - Continued on argatroban with good tolerance and then changed to eliquis   - Case discussed vascular and DVT likely chronic and no need for eliquis     ENDOCRINE  # DM2 A1C 14.0 (1/2024) > 6.4 (2/2025)  - Presented with hyperglycemia and continued on lantus and ISS   - Increased lantus, however FS continues to trend in 200s and changed to NPH with improvement.  - TF interrupted for bronchoscopy and adjustment to bolus feeds and FS dropped.   - NPH stopped and FS improved, however trended back up with continuation of tube feeds.   - NPH resumed, however FS continues to wax and wane and case discussed with endocrine   - morning hypoglycemic episode (3/11), changed lantus to 8 per endocrine. Continue with lispro 5 with ISS for now.   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 Patent recently admitted in 6/2024 for left pontine and cerebellar CVA requiring tracheostomy. While at [facility] patient decannulated and passed SS with advanced diet to easy to chew with thin liquids, but complicated COVID requiring transfer to [facility] in 7/2024 and then ultimately discharged home. Per wife patient was doing well at home until 10/2024 when he was found with RLL with concern for aspiration requiring intubation, then tracheostomy, and ultimately discharged to NH with vent dependence in 11/2024. Patient now presented for RUE fistulogram and angioplasty with vascular, however course complicated by leukocytosis with concern for recurrent trachitis vs PNA and UTI, AOCD and hyperglycemia. Admitted to RCU for further management. Found with  PSA trachitis/ PNA and enterococcus faecium UTI. Course complicated by RUE brachial DVT and hypoxia with HD with concern for volume down vs PE? Volume removal held and hypoxia improved. AC given empirically and CTA CHEST with no PE. Case discussed with vascular and since brachial DVT appears old no need for chronic AC . Course further complicated by PSA LLL PNA and continued on zosyn.     NEUROLOGY  # Poor mentation second to metabolic encephalopathy vs psychosomatic/ depression   - Hx of L cerebellar and pontene embolic CVA x 2 with residual R hemiplegia   - AOx0, bedbound, and nonverbal at baseline per report, however per exam patient able to open eyes, able to follow commands on left (LUE>LLE) with SEVERE weakness, however noted with R hemiplegia per baseline  - Nods yes and no occasionally however keeps eyes closed likely psychosomatic vs metabolic encephalopathy with infections?   - Last CTH in 10/2024 with no acute findings   - Hold [facility] CTH for now   - Monitor mentation     CARDIOVASCULAR  # Hx of HTN and HLD  - Continue on hydral 75 (increased 3/13) and added labetalol 100 mg BID (3/13)  - Continue on norvasc 10   - Monitor BP     # Incidental Pericardial effusion   - Incidental small pericardial effusion seen adjacent to right ventricle, however IVC appears flat and LE without edema with low concern for RV failure.   - Prior TTE reviewed from 4/2024 with normal RVLVSF with no pericardial effusion noted at the time.   - TTE 2/23 with EF 65, normal LVRVSF, mild LAD, normal RA, mild pulmonary hypertension, and small pericardial effusion noted adjacent to the anterior right ventricle with no echocardiographic evidence of tamponade  - Monitor hemodynamics     RESPIRATORY  # Respiratory failure second to PNA vs volume down vs PE?   - Hx of COPD with chronic respiratory failure with tracheostomy and vent dependence  - Admitted for angioplasty of RUE AVF, however course complicated by leukocytosis with concern for recurrent trachitis/ PNA.   - Course complicated by hypoxia during HD likely volume down vs migration of RUE brachial DVT with PE?   - Held volume removal, bolus given, and hypoxia improved.   - CTA CHEST without PE  - Course complicated by LLL mucous plug and IPV started with improvement   - Continue on albuterol, NS 0.9 and IPV  - Continue on vent 15/400/6/40    HEENT   # Hemoptysis   - Wife reported hemoptysis while at nursing home 2 weeks prior to admission   - No hemoptysis noted on admission   - Hemoptysis returned in house   - CTA CHEST 2/24 without PE  - Bronch 2/26 with no evidence of bleeding   - Bleeding resolved     GI  # Dysphagia with PEG   - Passed SS with advanced diet to easy to chew with thin liquids in 7/2024, however since recurrent aspiration in 10/2024 now with PEG/ vent dependence   - Continue on PEG TF and changed to bolus feeds   - Monitor tolerance     RENAL  # ESRD on HD   # Dehydration   - Resumed on HD with no flow issue from AVF   - Course complicated by hypoxia with concern for volume down given small IVC and elevated lactate on 2/21  - Volume removal held, lactate improved, and hypoxia improved.   - Continue on HD MWF in house per renal   - Return to HD MTRF outpatient   - Monitor renal function, electrolytes and UOP     # AVF trouble   # AVF oozing   - Presented for RUE fistulogram and angioplasty with vascular on 2/18   - Oozing from AVF post HD, surgicell placed, and AC held with improvement.  - Discussed with vascular and no need for AC given DVT as below is chronic   - RPT DOPPLER with again noted DVT in right stented brachial vein, however als noted with significantly elevated flow velocities with turbulence at the anastomotic site. Wall thickening noted at the proximal segment of the outflow vein, which is otherwise patent. Distal segment of the outflow vein appears patent without thrombosis evidence.   - Discussed with vascular and given patent AVF with no HD trouble no need for fistulogram at this time.     # Hyponatremia likely volume down   - Concern for volume issues, however overall appears volume down with serum osmo 307  - Sodium 128 and improved with HD/ bolus during HD.   - Trend sodium with HD for now     # Elevated lactate   - Lactate elevated likely second to volume down?   - Post bolus lactate trending down from 3.3 to 2.7 to 1.3    INFECTIOUS DISEASE  # Recurrent LLL PNA   - CXR with LLL mucous plug   - POCUS with LLL consolidation vs atelectasis   - Recent BAL with PSA as below   - FULL RVP negative  - SCx with PSA  - Fevers returned and zosyn (3/3 - 3/11), last dose 3/11  - WBC rising and low grade fever overnight; Discussed with ID, recommended CT chest, abdomen and pelvis  - Restarted Zosyn empirically (3/12 -)  - 3/13 - CT C/A/P with MFPNA, small b/l pleff, and infiltration of skin and sub tissue adjacent to R femur  - Continued on IV Zosyn, recent BCx with enterococcus, staph epi and candida auris  - added Vancomycin and Caspo 3/13 - ), repeat BCx x 2   - rpt TTE  - Optho c/s for r/o endophthalmitis    # Hemoptysis   - Hemoptysis as above   - BAL with PSA as prior   - No fever and monitor off ABX     # Trachitis vs PNA/ UTI   - Hx of steno and PSA in sputum   - Flu/ COVID negative   - MRSA negative   - UCx with enterococcus   - SCx with  PSA   - Found with leukocytosis and started on cefepime and christiano (2/19) and vanco on HD days (2/21).   - No further steno seen in sputum and continue on cefepime (2/19 - 2/25) and vanco on HD days (2/21, 2/22 - 2/25)   - WBC increased likely reactive to hypoxic event and now improving  - ID following     # PPX   - MRSA PCR negative  - Candida auris PCR POSITIVE  - Continue on isolation precautions per IC    HEME  # AOCD   - Presented for angioplasty and found with anemia to 6.7 s/p 1U PRBC 2/19 and 1/2 PRBC 2/23   - Anemia panel with concern for AOCD   - Continue on EPO QIW and Fe   - Monitor HH     # Heparin resistance?   - PTT persistently low despite increasing heparin GTT   - Resume on heparin GTT and anti Xa level supra therapeutic on but PTT remained low  - Patient ultimately with concern for heparin resistance    VASCULAR  # RUE DVT   - RUE edema noted with age-indeterminate, non-occlusive deep vein thrombosis noted in the right brachial vein.   - Case discussed with vascular who recommends full AC   - CTH from prior with encephalomalcia, however no hx of intracranial bleed   - Prior UGIB while on AC, however discharged on eliquis in 7/2024 and completed 6 month course for RLE DVT   - Continue on heparin GTT, however held for hemoptysis and resistance   - Continued on argatroban with good tolerance and then changed to eliquis   - Case discussed vascular and DVT likely chronic and no need for eliquis     ENDOCRINE  # DM2 A1C 14.0 (1/2024) > 6.4 (2/2025)  - Presented with hyperglycemia and continued on lantus and ISS   - Increased lantus, however FS continues to trend in 200s and changed to NPH with improvement.  - TF interrupted for bronchoscopy and adjustment to bolus feeds and FS dropped.   - NPH stopped and FS improved, however trended back up with continuation of tube feeds.   - NPH resumed, however FS continues to wax and wane and case discussed with endocrine   - morning hypoglycemic episode (3/11), changed lantus to 8 per endocrine. Continue with lispro 5 with ISS for now.   - Sugars rising after lowering Lantus dose, will follow up with endocrine   - Monitor FS and adjust as needed  - Endocrine following     ETHICS/ GOC    - Palliative discussion on 3/5  - Remains FULL CODE     DISPO - Back to NH when able

## 2025-03-13 NOTE — PROGRESS NOTE ADULT - SUBJECTIVE AND OBJECTIVE BOX
[facility]   MD ELIANE BHAGAT MD ANGELA WONG, PA    TEL:  OFFICE: [phone]  From 5pm-7am Answering Service [phone]    -- RENAL FOLLOW UP NOTE ---Date of Service 03-13-25 @ 13:41    Patient is a 74y old  Male who presents with a chief complaint of 2/18/25 was in cath lab earlier today with vascular surgery for fistulogram and noted to have a WBC of 18 and sent to ED and admitted (04 Mar 2025 12:22)      Patient seen and examined at bedside.     VITALS:  T(F): 98.9 (03-13-25 @ 08:00), Max: 100 (03-12-25 @ 21:37)  HR: 89 (03-13-25 @ 11:31)  BP: 190/65 (03-13-25 @ 08:00)  RR: 18 (03-13-25 @ 08:00)  SpO2: 100% (03-13-25 @ 11:31)  Wt(kg): --    03-12 @ 07:01  -  03-13 @ 07:00  --------------------------------------------------------  IN: 1330 mL / OUT: 2400 mL / NET: -1070 mL          PHYSICAL EXAM:  General: nonverbal  Neck: +trach  Respiratory: CTAB, no wheezes, rales or rhonchi  Cardiovascular: S1, S2, RRR  Gastrointestinal: +peg  Extremities: No peripheral edema    Hospital Medications:   MEDICATIONS  (STANDING):  albuterol    0.083% 2.5 milliGRAM(s) Nebulizer every 6 hours  amLODIPine   Tablet 10 milliGRAM(s) Oral daily  AQUAPHOR (petrolatum Ointment) 1 Application(s) Topical two times a day  atorvastatin 20 milliGRAM(s) Oral at bedtime  caspofungin IVPB      chlorhexidine 0.12% Liquid 15 milliLiter(s) Oral Mucosa every 12 hours  chlorhexidine 2% Cloths 1 Application(s) Topical daily  dextrose 5%. 1000 milliLiter(s) (100 mL/Hr) IV Continuous <Continuous>  dextrose 5%. 1000 milliLiter(s) (50 mL/Hr) IV Continuous <Continuous>  dextrose 50% Injectable 25 Gram(s) IV Push once  dextrose 50% Injectable 12.5 Gram(s) IV Push once  dextrose 50% Injectable 25 Gram(s) IV Push once  epoetin reilly-epbx (RETACRIT) Injectable 23183 Unit(s) IV Push <User Schedule>  ferrous    sulfate Liquid 300 milliGRAM(s) Enteral Tube daily  glucagon  Injectable 1 milliGRAM(s) IntraMuscular once  heparin   Injectable 5000 Unit(s) SubCutaneous every 12 hours  hydrALAZINE 50 milliGRAM(s) Oral three times a day  insulin glargine Injectable (LANTUS) 12 Unit(s) SubCutaneous at bedtime  insulin lispro (ADMELOG) corrective regimen sliding scale   SubCutaneous every 6 hours  insulin lispro Injectable (ADMELOG) 6 Unit(s) SubCutaneous every 6 hours  labetalol 100 milliGRAM(s) Oral two times a day  Nephro-noam 1 Tablet(s) Oral daily  pantoprazole   Suspension 40 milliGRAM(s) Oral before breakfast  piperacillin/tazobactam IVPB.. 3.375 Gram(s) IV Intermittent every 12 hours  sodium chloride 0.9% for Nebulization 3 milliLiter(s) Nebulizer every 6 hours      LABS:  03-13    135  |  93[L]  |  40[H]  ----------------------------<  212[H]  3.3[L]   |  27  |  2.38[H]    Ca    9.7      13 Mar 2025 06:30  Phos  2.5     03-13  Mg     2.50     03-13      Creatinine Trend: 2.38 <--, 3.35 <--, 2.44 <--, 3.98 <--, 3.05 <--, 2.28 <--, 3.24 <--    Phosphorus: 2.5 mg/dL (03-13 @ 06:30)                              7.7    18.65 )-----------( 326      ( 13 Mar 2025 06:30 )             25.1     Urine Studies:  Urinalysis - [03-13-25 @ 06:30]      Color  / Appearance  / SG  / pH       Gluc 212 / Ketone   / Bili  / Urobili        Blood  / Protein  / Leuk Est  / Nitrite       RBC  / WBC  / Hyaline  / Gran  / Sq Epi  / Non Sq Epi  / Bacteria       Iron 46, TIBC 142, %sat 32      [02-19-25 @ 11:39]  Ferritin 3601      [02-19-25 @ 11:39]  PTH -- (Ca --)      [03-09-25 @ 05:20]   25  PTH -- (Ca --)      [05-26-24 @ 01:20]   122  TSH 1.660      [10-05-24 @ 08:37]  Lipid: chol --, , HDL --, LDL --      [10-18-24 @ 06:00]    HBsAb 654.8      [02-19-25 @ 19:05]  HBsAg Nonreact      [02-19-25 @ 19:05]  HBcAb Nonreact      [02-19-25 @ 19:05]  HCV 0.19, Nonreact      [02-19-25 @ 19:05]      RADIOLOGY & ADDITIONAL STUDIES:

## 2025-03-13 NOTE — PROGRESS NOTE ADULT - SUBJECTIVE AND OBJECTIVE BOX
[facility]  AUGUST Carbajal Y. Patel, S. Shah, G. Casimir  [phone]  ([phone] - weekdays after 5pm and weekends)    Name: JIMMY BLAND  Age/Gender: 74y Male  MRN: [id number]    Interval History:  Notes reviewed.   Afebrile.   Tmax 100 overnight    Allergies: No Known Allergies      Objective:  Vitals:   T(F): 98.9 (03-13-25 @ 08:00), Max: 100 (03-12-25 @ 21:37)  HR: 89 (03-13-25 @ 08:00) (84 - 110)  BP: 190/65 (03-13-25 @ 08:00) (158/68 - 190/65)  RR: 18 (03-13-25 @ 08:00) (16 - 24)  SpO2: 100% (03-13-25 @ 08:00) (98% - 100%)  Physical Examination:  General: frail appearing  HEENT: anicteric, tracheostomy, on vent  Cardio: S1, S2, normal rate  Resp: decreased breath sounds  Abd: Soft. Nondistended. PEG  Ext: no erythema or warmth to R thigh  Skin: warm, dry    Laboratory Studies:  CBC:                       7.7    18.65 )-----------( 326      ( 13 Mar 2025 06:30 )             25.1     WBC Trend:  18.65 03-13-25 @ 06:30  18.73 03-12-25 @ 04:53  14.20 03-11-25 @ 05:50  23.01 03-10-25 @ 17:00  21.01 03-09-25 @ 05:20  18.15 03-08-25 @ 05:30  15.35 03-07-25 @ 22:30  11.89 03-07-25 @ 07:10    CMP: 03-13    135  |  93[L]  |  40[H]  ----------------------------<  212[H]  3.3[L]   |  27  |  2.38[H]    Ca    9.7      13 Mar 2025 06:30  Phos  2.5     03-13  Mg     2.50     03-13            Urinalysis Basic - ( 13 Mar 2025 06:30 )    Color: x / Appearance: x / SG: x / pH: x  Gluc: 212 mg/dL / Ketone: x  / Bili: x / Urobili: x   Blood: x / Protein: x / Nitrite: x   Leuk Esterase: x / RBC: x / WBC x   Sq Epi: x / Non Sq Epi: x / Bacteria: x      Microbiology: reviewed     Culture - Blood (collected 03-12-25 @ 04:52)  Source: Blood Blood-Venous  Gram Stain (03-13-25 @ 06:28):    Growth in anaerobic bottle: Gram Positive Cocci in Clusters and Gram    Positive Cocci in Pairs and Chains  Preliminary Report (03-13-25 @ 06:29):    Growth in anaerobic bottle: Gram Positive Cocci in Clusters and Gram    Positive Cocci in Pairs and Chains    Direct identification is available within approximately 3-5    hours either by Blood Panel Multiplexed PCR or Direct    MALDI-TOF. Details: [email or web address]  Organism: Blood Culture PCR (03-13-25 @ 09:47)  Organism: Blood Culture PCR (03-13-25 @ 09:47)      Method Type: PCR      -  Enterococcus faecium: Detec      -  Staphylococcus epidermidis, Methicillin resistant: Detec      -  Candida auris: Detec    Culture - Blood (collected 03-12-25 @ 04:40)  Source: Blood Blood-Venous  Preliminary Report (03-13-25 @ 10:01):    No growth at 24 hours    Culture - Blood (collected 03-09-25 @ 05:20)  Source: Blood Blood-Peripheral  Preliminary Report (03-13-25 @ 09:00):    No growth at 4 days    Culture - Blood (collected 03-09-25 @ 05:05)  Source: Blood Blood-Venous  Preliminary Report (03-13-25 @ 09:00):    No growth at 4 days    Culture - Blood (collected 03-03-25 @ 16:27)  Source: Blood Blood-Peripheral  Final Report (03-08-25 @ 22:01):    No growth at 5 days    Culture - Blood (collected 03-03-25 @ 16:23)  Source: Blood Blood-Venous  Final Report (03-08-25 @ 22:01):    No growth at 5 days    Culture - Sputum (collected 03-03-25 @ 14:10)  Source: Trach Asp Tracheal Aspirate  Gram Stain (03-04-25 @ 04:28):    Few polymorphonuclear leukocytes per low power field    No Squamous epithelial cells per low power field    Moderate Gram Negative Rods seen per oil power field  Final Report (03-05-25 @ 16:12):    Numerous Pseudomonas aeruginosa (Carbapenem Resistant)    Commensal dominick consistent with body site  Organism: Pseudomonas aeruginosa (Carbapenem Resistant) (03-05-25 @ 16:12)  Organism: Pseudomonas aeruginosa (Carbapenem Resistant) (03-05-25 @ 16:12)      Method Type: CarbaR      -  Resistance Gene to Carbapenem: Nondet  Organism: Pseudomonas aeruginosa (Carbapenem Resistant) (03-05-25 @ 16:12)      Method Type: VIDA      -  Aztreonam: S <=4      -  Cefepime: S <=2      -  Ceftazidime: S 4      -  Ceftazidime/Avibactam: S <=4      -  Ceftolozane/tazobactam: S <=2      -  Ciprofloxacin: S <=0.25      -  Imipenem: R 8      -  Levofloxacin: S <=0.5      -  Meropenem: R 8      -  Piperacillin/Tazobactam: S <=8        Radiology: reviewed     Medications:  albuterol    0.083% 2.5 milliGRAM(s) Nebulizer every 6 hours  amLODIPine   Tablet 10 milliGRAM(s) Oral daily  AQUAPHOR (petrolatum Ointment) 1 Application(s) Topical two times a day  atorvastatin 20 milliGRAM(s) Oral at bedtime  caspofungin IVPB      chlorhexidine 0.12% Liquid 15 milliLiter(s) Oral Mucosa every 12 hours  chlorhexidine 2% Cloths 1 Application(s) Topical daily  dextrose 5%. 1000 milliLiter(s) IV Continuous <Continuous>  dextrose 5%. 1000 milliLiter(s) IV Continuous <Continuous>  dextrose 50% Injectable 25 Gram(s) IV Push once  dextrose 50% Injectable 12.5 Gram(s) IV Push once  dextrose 50% Injectable 25 Gram(s) IV Push once  dextrose Oral Gel 15 Gram(s) Oral once PRN  epoetin reilly-epbx (RETACRIT) Injectable 67541 Unit(s) IV Push <User Schedule>  ferrous    sulfate Liquid 300 milliGRAM(s) Enteral Tube daily  glucagon  Injectable 1 milliGRAM(s) IntraMuscular once  heparin   Injectable 5000 Unit(s) SubCutaneous every 12 hours  hydrALAZINE 50 milliGRAM(s) Oral three times a day  insulin glargine Injectable (LANTUS) 12 Unit(s) SubCutaneous at bedtime  insulin lispro (ADMELOG) corrective regimen sliding scale   SubCutaneous every 6 hours  insulin lispro Injectable (ADMELOG) 6 Unit(s) SubCutaneous every 6 hours  labetalol 100 milliGRAM(s) Oral two times a day  Nephro-noam 1 Tablet(s) Oral daily  pantoprazole   Suspension 40 milliGRAM(s) Oral before breakfast  piperacillin/tazobactam IVPB.. 3.375 Gram(s) IV Intermittent every 12 hours  sodium chloride 0.9% Bolus. 100 milliLiter(s) IV Bolus every 5 minutes PRN  sodium chloride 0.9% for Nebulization 3 milliLiter(s) Nebulizer every 6 hours  vancomycin  IVPB 500 milliGRAM(s) IV Intermittent once    Antimicrobials:  caspofungin IVPB      piperacillin/tazobactam IVPB.. 3.375 Gram(s) IV Intermittent every 12 hours  vancomycin  IVPB 500 milliGRAM(s) IV Intermittent once

## 2025-03-14 LAB
ANION GAP SERPL CALC-SCNC: 15 MMOL/L — HIGH (ref 7–14)
BASOPHILS # BLD AUTO: 0.07 K/UL — SIGNIFICANT CHANGE UP (ref 0–0.2)
BASOPHILS NFR BLD AUTO: 0.4 % — SIGNIFICANT CHANGE UP (ref 0–2)
CALCIUM SERPL-MCNC: 9.6 MG/DL — SIGNIFICANT CHANGE UP (ref 8.4–10.5)
CHLORIDE SERPL-SCNC: 90 MMOL/L — LOW (ref 98–107)
CO2 SERPL-SCNC: 26 MMOL/L — SIGNIFICANT CHANGE UP (ref 22–31)
CREAT SERPL-MCNC: 3.18 MG/DL — HIGH (ref 0.5–1.3)
CULTURE RESULTS: SIGNIFICANT CHANGE UP
CULTURE RESULTS: SIGNIFICANT CHANGE UP
EGFR: 20 ML/MIN/1.73M2 — LOW
EOSINOPHIL # BLD AUTO: 0.26 K/UL — SIGNIFICANT CHANGE UP (ref 0–0.5)
EOSINOPHIL NFR BLD AUTO: 1.6 % — SIGNIFICANT CHANGE UP (ref 0–6)
GLUCOSE BLDC GLUCOMTR-MCNC: 119 MG/DL — HIGH (ref 70–99)
GLUCOSE BLDC GLUCOMTR-MCNC: 187 MG/DL — HIGH (ref 70–99)
GLUCOSE SERPL-MCNC: 159 MG/DL — HIGH (ref 70–99)
HCT VFR BLD CALC: 22.2 % — LOW (ref 39–50)
HGB BLD-MCNC: 6.8 G/DL — CRITICAL LOW (ref 13–17)
IANC: 14.11 K/UL — HIGH (ref 1.8–7.4)
IMM GRANULOCYTES NFR BLD AUTO: 1.3 % — HIGH (ref 0–0.9)
LYMPHOCYTES # BLD AUTO: 0.99 K/UL — LOW (ref 1–3.3)
LYMPHOCYTES # BLD AUTO: 6 % — LOW (ref 13–44)
MAGNESIUM SERPL-MCNC: 2.6 MG/DL — SIGNIFICANT CHANGE UP (ref 1.6–2.6)
MCHC RBC-ENTMCNC: 24.2 PG — LOW (ref 27–34)
MCHC RBC-ENTMCNC: 30.6 G/DL — LOW (ref 32–36)
MCV RBC AUTO: 79 FL — LOW (ref 80–100)
MONOCYTES # BLD AUTO: 0.82 K/UL — SIGNIFICANT CHANGE UP (ref 0–0.9)
MONOCYTES NFR BLD AUTO: 5 % — SIGNIFICANT CHANGE UP (ref 2–14)
NEUTROPHILS # BLD AUTO: 14.11 K/UL — HIGH (ref 1.8–7.4)
NEUTROPHILS NFR BLD AUTO: 85.7 % — HIGH (ref 43–77)
NRBC # BLD AUTO: 0.19 K/UL — HIGH (ref 0–0)
NRBC # FLD: 0.19 K/UL — HIGH (ref 0–0)
NRBC BLD AUTO-RTO: 1 /100 WBCS — HIGH (ref 0–0)
PHOSPHATE SERPL-MCNC: 3 MG/DL — SIGNIFICANT CHANGE UP (ref 2.5–4.5)
PLATELET # BLD AUTO: 289 K/UL — SIGNIFICANT CHANGE UP (ref 150–400)
POTASSIUM SERPL-MCNC: 3.8 MMOL/L — SIGNIFICANT CHANGE UP (ref 3.5–5.3)
POTASSIUM SERPL-SCNC: 3.8 MMOL/L — SIGNIFICANT CHANGE UP (ref 3.5–5.3)
RBC # BLD: 2.81 M/UL — LOW (ref 4.2–5.8)
RBC # FLD: 18.2 % — HIGH (ref 10.3–14.5)
SODIUM SERPL-SCNC: 131 MMOL/L — LOW (ref 135–145)
SPECIMEN SOURCE: SIGNIFICANT CHANGE UP
SPECIMEN SOURCE: SIGNIFICANT CHANGE UP
WBC # BLD: 16.47 K/UL — HIGH (ref 3.8–10.5)
WBC # FLD AUTO: 16.47 K/UL — HIGH (ref 3.8–10.5)

## 2025-03-14 PROCEDURE — 99233 SBSQ HOSP IP/OBS HIGH 50: CPT

## 2025-03-14 PROCEDURE — 99232 SBSQ HOSP IP/OBS MODERATE 35: CPT

## 2025-03-14 RX ORDER — VANCOMYCIN HCL IN 5 % DEXTROSE 1.5G/250ML
750 PLASTIC BAG, INJECTION (ML) INTRAVENOUS ONCE
Refills: 0 | Status: COMPLETED | OUTPATIENT
Start: 2025-03-14 | End: 2025-03-14

## 2025-03-14 RX ADMIN — Medication 1 TABLET(S): at 12:35

## 2025-03-14 RX ADMIN — INSULIN LISPRO 6 UNIT(S): 100 INJECTION, SOLUTION INTRAVENOUS; SUBCUTANEOUS at 12:01

## 2025-03-14 RX ADMIN — Medication 250 MILLIGRAM(S): at 23:07

## 2025-03-14 RX ADMIN — INSULIN LISPRO 6 UNIT(S): 100 INJECTION, SOLUTION INTRAVENOUS; SUBCUTANEOUS at 05:26

## 2025-03-14 RX ADMIN — Medication 2.5 MILLIGRAM(S): at 15:40

## 2025-03-14 RX ADMIN — Medication 300 MILLIGRAM(S): at 12:35

## 2025-03-14 RX ADMIN — INSULIN LISPRO 1: 100 INJECTION, SOLUTION INTRAVENOUS; SUBCUTANEOUS at 17:46

## 2025-03-14 RX ADMIN — HEPARIN SODIUM 5000 UNIT(S): 1000 INJECTION INTRAVENOUS; SUBCUTANEOUS at 17:45

## 2025-03-14 RX ADMIN — Medication 2.5 MILLIGRAM(S): at 10:51

## 2025-03-14 RX ADMIN — AMLODIPINE BESYLATE 10 MILLIGRAM(S): 10 TABLET ORAL at 05:28

## 2025-03-14 RX ADMIN — INSULIN LISPRO 1: 100 INJECTION, SOLUTION INTRAVENOUS; SUBCUTANEOUS at 05:24

## 2025-03-14 RX ADMIN — Medication 75 MILLIGRAM(S): at 05:22

## 2025-03-14 RX ADMIN — ATORVASTATIN CALCIUM 20 MILLIGRAM(S): 80 TABLET, FILM COATED ORAL at 22:50

## 2025-03-14 RX ADMIN — EPOETIN ALFA 10000 UNIT(S): 10000 SOLUTION INTRAVENOUS; SUBCUTANEOUS at 10:13

## 2025-03-14 RX ADMIN — Medication 75 MILLIGRAM(S): at 22:50

## 2025-03-14 RX ADMIN — Medication 15 MILLILITER(S): at 17:45

## 2025-03-14 RX ADMIN — LABETALOL HYDROCHLORIDE 100 MILLIGRAM(S): 200 TABLET, FILM COATED ORAL at 17:46

## 2025-03-14 RX ADMIN — Medication 15 MILLILITER(S): at 05:26

## 2025-03-14 RX ADMIN — Medication 1 APPLICATION(S): at 12:34

## 2025-03-14 RX ADMIN — WHITE PETROLATUM 1 APPLICATION(S): 1 OINTMENT TOPICAL at 05:23

## 2025-03-14 RX ADMIN — Medication 2.5 MILLIGRAM(S): at 03:38

## 2025-03-14 RX ADMIN — CASPOFUNGIN ACETATE 260 MILLIGRAM(S): 5 INJECTION, POWDER, LYOPHILIZED, FOR SOLUTION INTRAVENOUS at 15:11

## 2025-03-14 RX ADMIN — Medication 2.5 MILLIGRAM(S): at 21:34

## 2025-03-14 RX ADMIN — LABETALOL HYDROCHLORIDE 100 MILLIGRAM(S): 200 TABLET, FILM COATED ORAL at 05:35

## 2025-03-14 RX ADMIN — Medication 40 MILLIGRAM(S): at 05:26

## 2025-03-14 RX ADMIN — Medication 25 GRAM(S): at 17:44

## 2025-03-14 RX ADMIN — HEPARIN SODIUM 5000 UNIT(S): 1000 INJECTION INTRAVENOUS; SUBCUTANEOUS at 05:35

## 2025-03-14 RX ADMIN — Medication 75 MILLIGRAM(S): at 14:35

## 2025-03-14 RX ADMIN — INSULIN LISPRO 6 UNIT(S): 100 INJECTION, SOLUTION INTRAVENOUS; SUBCUTANEOUS at 17:47

## 2025-03-14 RX ADMIN — WHITE PETROLATUM 1 APPLICATION(S): 1 OINTMENT TOPICAL at 17:45

## 2025-03-14 RX ADMIN — Medication 25 GRAM(S): at 05:23

## 2025-03-14 NOTE — PROGRESS NOTE ADULT - ASSESSMENT
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 Patent recently admitted in 6/2024 for left pontine and cerebellar CVA requiring tracheostomy. While at [facility] patient decannulated and passed SS with advanced diet to easy to chew with thin liquids, but complicated COVID requiring transfer to [facility] in 7/2024 and then ultimately discharged home. Per wife patient was doing well at home until 10/2024 when he was found with RLL with concern for aspiration requiring intubation, then tracheostomy, and ultimately discharged to NH with vent dependence in 11/2024. Patient now presented for RUE fistulogram and angioplasty with vascular, however course complicated by leukocytosis with concern for recurrent trachitis vs PNA and UTI, AOCD and hyperglycemia. Admitted to RCU for further management. Found with  PSA trachitis/ PNA and enterococcus faecium UTI. Course complicated by RUE brachial DVT and hypoxia with HD with concern for volume down vs PE? Volume removal held and hypoxia improved. AC given empirically and CTA CHEST with no PE. Case discussed with vascular and since brachial DVT appears old no need for chronic AC . Course further complicated by PSA LLL PNA and continued on zosyn.     NEUROLOGY  # Poor mentation second to metabolic encephalopathy vs psychosomatic/ depression   - Hx of L cerebellar and pontene embolic CVA x 2 with residual R hemiplegia   - AOx0, bedbound, and nonverbal at baseline per report, however per exam patient able to open eyes, able to follow commands on left (LUE>LLE) with SEVERE weakness, however noted with R hemiplegia per baseline  - Nods yes and no occasionally however keeps eyes closed likely psychosomatic vs metabolic encephalopathy with infections?   - Last CTH in 10/2024 with no acute findings   - Hold [facility] CTH for now   - Monitor mentation     CARDIOVASCULAR  # Hx of HTN and HLD  - Continue on hydral 75 (increased 3/13) and added labetalol 100 mg BID (3/13) - titrate as tolerated   - Continue on norvasc 10   - Monitor BP     # Incidental Pericardial effusion   - Incidental small pericardial effusion seen adjacent to right ventricle, however IVC appears flat and LE without edema with low concern for RV failure.   - Prior TTE reviewed from 4/2024 with normal RVLVSF with no pericardial effusion noted at the time.   - TTE 2/23 with EF 65, normal LVRVSF, mild LAD, normal RA, mild pulmonary hypertension, and small pericardial effusion noted adjacent to the anterior right ventricle with no echocardiographic evidence of tamponade  - Monitor hemodynamics     RESPIRATORY  # Respiratory failure second to PNA vs volume down vs PE?   - Hx of COPD with chronic respiratory failure with tracheostomy and vent dependence  - Admitted for angioplasty of RUE AVF, however course complicated by leukocytosis with concern for recurrent trachitis/ PNA.   - Course complicated by hypoxia during HD likely volume down vs migration of RUE brachial DVT with PE?   - Held volume removal, bolus given, and hypoxia improved.   - CTA CHEST without PE  - Course complicated by LLL mucous plug and IPV started with improvement   - Continue on albuterol, NS 0.9 and IPV  - Continue on vent 15/400/6/40    HEENT   # Hemoptysis   - Wife reported hemoptysis while at nursing home 2 weeks prior to admission   - No hemoptysis noted on admission   - Hemoptysis returned in house   - CTA CHEST 2/24 without PE  - Bronch 2/26 with no evidence of bleeding   - Bleeding resolved     GI  # Dysphagia with PEG   - Passed SS with advanced diet to easy to chew with thin liquids in 7/2024, however since recurrent aspiration in 10/2024 now with PEG/ vent dependence   - Continue on PEG TF and changed to bolus feeds   - Monitor tolerance     RENAL  # ESRD on HD   # Dehydration   - Resumed on HD with no flow issue from AVF   - Course complicated by hypoxia with concern for volume down given small IVC and elevated lactate on 2/21  - Volume removal held, lactate improved, and hypoxia improved.   - Continue on HD MWF in house per renal   - Return to HD MTRF outpatient   - Monitor renal function, electrolytes and UOP     # AVF trouble   # AVF oozing   - Presented for RUE fistulogram and angioplasty with vascular on 2/18   - Oozing from AVF post HD, surgicell placed, and AC held with improvement.  - Discussed with vascular and no need for AC given DVT as below is chronic   - RPT DOPPLER with again noted DVT in right stented brachial vein, however als noted with significantly elevated flow velocities with turbulence at the anastomotic site. Wall thickening noted at the proximal segment of the outflow vein, which is otherwise patent. Distal segment of the outflow vein appears patent without thrombosis evidence.   - Discussed with vascular and given patent AVF with no HD trouble no need for fistulogram at this time.     # Hyponatremia likely volume down   - Concern for volume issues, however overall appears volume down with serum osmo 307  - Sodium 128 and improved with HD/ bolus during HD.   - Trend sodium with HD for now     # Elevated lactate   - Lactate elevated likely second to volume down?   - Post bolus lactate trending down from 3.3 to 2.7 to 1.3    INFECTIOUS DISEASE  # Recurrent LLL PNA   - CXR with LLL mucous plug   - POCUS with LLL consolidation vs atelectasis   - Recent BAL with PSA as below   - FULL RVP negative  - SCx with PSA  - Fevers returned and zosyn (3/3 - 3/11), last dose 3/11  - WBC rising and low grade fever overnight; Discussed with ID, recommended CT chest, abdomen and pelvis  - Restarted Zosyn empirically (3/12 -)  - 3/13 - CT C/A/P with MFPNA, small b/l pleff, and infiltration of skin and sub tissue adjacent to R femur  - Continued on IV Zosyn, recent BCx with enterococcus, staph epi and candida auris  - added Vancomycin and Caspo 3/13 - ), repeat BCx x 2 in laboratory   - rpt TTE without vegetations   - Optho c/s for r/o endophthalmitis - negative    # Hemoptysis   - Hemoptysis as above   - BAL with PSA as prior   - No fever and monitor off ABX     # Trachitis vs PNA/ UTI   - Hx of steno and PSA in sputum   - Flu/ COVID negative   - MRSA negative   - UCx with enterococcus   - SCx with  PSA   - Found with leukocytosis and started on cefepime and christiano (2/19) and vanco on HD days (2/21).   - No further steno seen in sputum and continue on cefepime (2/19 - 2/25) and vanco on HD days (2/21, 2/22 - 2/25)   - WBC increased likely reactive to hypoxic event and now improving  - ID following     # PPX   - MRSA PCR negative  - Candida auris PCR POSITIVE  - Continue on isolation precautions per IC    HEME  # AOCD   - Presented for angioplasty and found with anemia to 6.7 s/p 1U PRBC 2/19 and 1/2 PRBC 2/23   - Anemia panel with concern for AOCD   - Continue on EPO QIW and Fe   - Monitor HH     # Heparin resistance?   - PTT persistently low despite increasing heparin GTT   - Resume on heparin GTT and anti Xa level supra therapeutic on but PTT remained low  - Patient ultimately with concern for heparin resistance    VASCULAR  # RUE DVT   - RUE edema noted with age-indeterminate, non-occlusive deep vein thrombosis noted in the right brachial vein.   - Case discussed with vascular who recommends full AC   - CTH from prior with encephalomalcia, however no hx of intracranial bleed   - Prior UGIB while on AC, however discharged on eliquis in 7/2024 and completed 6 month course for RLE DVT   - Continue on heparin GTT, however held for hemoptysis and resistance   - Continued on argatroban with good tolerance and then changed to eliquis   - Case discussed vascular and DVT likely chronic and no need for eliquis     ENDOCRINE  # DM2 A1C 14.0 (1/2024) > 6.4 (2/2025)  - Presented with hyperglycemia and continued on lantus and ISS   - Increased lantus, however FS continues to trend in 200s and changed to NPH with improvement.  - TF interrupted for bronchoscopy and adjustment to bolus feeds and FS dropped.   - NPH stopped and FS improved, however trended back up with continuation of tube feeds.   - NPH resumed, however FS continues to wax and wane and case discussed with endocrine   - morning hypoglycemic episode (3/11), changed lantus to 8 per endocrine. Continue with lispro 5 with ISS for now.   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 Patent recently admitted in 6/2024 for left pontine and cerebellar CVA requiring tracheostomy. While at [facility] patient decannulated and passed SS with advanced diet to easy to chew with thin liquids, but complicated COVID requiring transfer to [facility] in 7/2024 and then ultimately discharged home. Per wife patient was doing well at home until 10/2024 when he was found with RLL with concern for aspiration requiring intubation, then tracheostomy, and ultimately discharged to NH with vent dependence in 11/2024. Patient now presented for RUE fistulogram and angioplasty with vascular, however course complicated by leukocytosis with concern for recurrent trachitis vs PNA and UTI, AOCD and hyperglycemia. Admitted to RCU for further management. Found with  PSA trachitis/ PNA and enterococcus faecium UTI. Course complicated by RUE brachial DVT and hypoxia with HD with concern for volume down vs PE? Volume removal held and hypoxia improved. AC given empirically and CTA CHEST with no PE. Case discussed with vascular and since brachial DVT appears old no need for chronic AC . Course further complicated by PSA LLL PNA and continued on zosyn.     NEUROLOGY  # Poor mentation second to metabolic encephalopathy vs psychosomatic/ depression   - Hx of L cerebellar and pontene embolic CVA x 2 with residual R hemiplegia   - AOx0, bedbound, and nonverbal at baseline per report, however per exam patient able to open eyes, able to follow commands on left (LUE>LLE) with SEVERE weakness, however noted with R hemiplegia per baseline  - Nods yes and no occasionally however keeps eyes closed likely psychosomatic vs metabolic encephalopathy with infections?   - Last CTH in 10/2024 with no acute findings   - Hold [facility] CTH for now   - Monitor mentation     CARDIOVASCULAR  # Hx of HTN and HLD  - Continue on hydral 75 (increased 3/13) and added labetalol 100 mg BID (3/13) - titrate as tolerated   - Continue on norvasc 10   - Monitor BP     # Incidental Pericardial effusion   - Incidental small pericardial effusion seen adjacent to right ventricle, however IVC appears flat and LE without edema with low concern for RV failure.   - Prior TTE reviewed from 4/2024 with normal RVLVSF with no pericardial effusion noted at the time.   - TTE 2/23 with EF 65, normal LVRVSF, mild LAD, normal RA, mild pulmonary hypertension, and small pericardial effusion noted adjacent to the anterior right ventricle with no echocardiographic evidence of tamponade  - Monitor hemodynamics     RESPIRATORY  # Respiratory failure second to PNA vs volume down vs PE?   - Hx of COPD with chronic respiratory failure with tracheostomy and vent dependence  - Admitted for angioplasty of RUE AVF, however course complicated by leukocytosis with concern for recurrent trachitis/ PNA.   - Course complicated by hypoxia during HD likely volume down vs migration of RUE brachial DVT with PE?   - Held volume removal, bolus given, and hypoxia improved.   - CTA CHEST without PE  - Course complicated by LLL mucous plug and IPV started with improvement   - Continue on albuterol, NS 0.9 and IPV  - Continue on vent 15/400/6/40    HEENT   # Hemoptysis   - Wife reported hemoptysis while at nursing home 2 weeks prior to admission   - No hemoptysis noted on admission   - Hemoptysis returned in house   - CTA CHEST 2/24 without PE  - Bronch 2/26 with no evidence of bleeding   - Bleeding resolved     GI  # Dysphagia with PEG   - Passed SS with advanced diet to easy to chew with thin liquids in 7/2024, however since recurrent aspiration in 10/2024 now with PEG/ vent dependence   - Continue on PEG TF and changed to bolus feeds   - Monitor tolerance     RENAL  # ESRD on HD   # Dehydration   - Resumed on HD with no flow issue from AVF   - Course complicated by hypoxia with concern for volume down given small IVC and elevated lactate on 2/21  - Volume removal held, lactate improved, and hypoxia improved.   - Continue on HD MWF in house per renal   - Return to HD MTRF outpatient   - Monitor renal function, electrolytes and UOP     # AVF trouble   # AVF oozing   - Presented for RUE fistulogram and angioplasty with vascular on 2/18   - Oozing from AVF post HD, surgicell placed, and AC held with improvement.  - Discussed with vascular and no need for AC given DVT as below is chronic   - RPT DOPPLER with again noted DVT in right stented brachial vein, however als noted with significantly elevated flow velocities with turbulence at the anastomotic site. Wall thickening noted at the proximal segment of the outflow vein, which is otherwise patent. Distal segment of the outflow vein appears patent without thrombosis evidence.   - Discussed with vascular and given patent AVF with no HD trouble no need for fistulogram at this time.     # Hyponatremia likely volume down   - Concern for volume issues, however overall appears volume down with serum osmo 307  - Sodium 128 and improved with HD/ bolus during HD.   - Trend sodium with HD for now     # Elevated lactate   - Lactate elevated likely second to volume down?   - Post bolus lactate trending down from 3.3 to 2.7 to 1.3    INFECTIOUS DISEASE  # Recurrent LLL PNA   - CXR with LLL mucous plug   - POCUS with LLL consolidation vs atelectasis   - Recent BAL with PSA as below   - FULL RVP negative  - SCx with PSA  - Fevers returned and zosyn (3/3 - 3/11), last dose 3/11  - WBC rising and low grade fever overnight; Discussed with ID, recommended CT chest, abdomen and pelvis  - Restarted Zosyn empirically (3/12 -)  - 3/13 - CT C/A/P with MFPNA, small b/l pleff, and infiltration of skin and sub tissue adjacent to R femur  - Continued on IV Zosyn, recent BCx with enterococcus, staph epi and candida auris  - added Vancomycin dose by level and Caspo 3/13 - )  - rpt TTE without vegetations and optho c/s for endophthalmitis negative    - repeat BCx 3/13 NGTD and repeat cultures for 3/14 pending     # Hemoptysis   - Hemoptysis as above   - BAL with PSA as prior   - No fever and monitor off ABX     # Trachitis vs PNA/ UTI   - Hx of steno and PSA in sputum   - Flu/ COVID negative   - MRSA negative   - UCx with enterococcus   - SCx with  PSA   - Found with leukocytosis and started on cefepime and christiano (2/19) and vanco on HD days (2/21).   - No further steno seen in sputum and continue on cefepime (2/19 - 2/25) and vanco on HD days (2/21, 2/22 - 2/25)   - WBC increased likely reactive to hypoxic event and now improving  - ID following     # PPX   - MRSA PCR negative  - Candida auris PCR POSITIVE  - Continue on isolation precautions per IC    HEME  # AOCD   - Presented for angioplasty and found with anemia to 6.7 s/p 1U PRBC 2/19 and 1/2 PRBC 2/23   - Anemia panel with concern for AOCD   - Continue on EPO QIW and Fe   - Monitor HH   - 3/14 Hgb 6.8 and transfusing 1u PRBC today, no active signs of bleeding    # Heparin resistance?   - PTT persistently low despite increasing heparin GTT   - Resume on heparin GTT and anti Xa level supra therapeutic on but PTT remained low  - Patient ultimately with concern for heparin resistance    VASCULAR  # RUE DVT   - RUE edema noted with age-indeterminate, non-occlusive deep vein thrombosis noted in the right brachial vein.   - Case discussed with vascular who recommends full AC   - CTH from prior with encephalomalcia, however no hx of intracranial bleed   - Prior UGIB while on AC, however discharged on eliquis in 7/2024 and completed 6 month course for RLE DVT   - Continue on heparin GTT, however held for hemoptysis and resistance   - Continued on argatroban with good tolerance and then changed to eliquis   - Case discussed vascular and DVT likely chronic and no need for eliquis     ENDOCRINE  # DM2 A1C 14.0 (1/2024) > 6.4 (2/2025)  - Presented with hyperglycemia and continued on lantus and ISS   - Increased lantus, however FS continues to trend in 200s and changed to NPH with improvement.  - TF interrupted for bronchoscopy and adjustment to bolus feeds and FS dropped.   - NPH stopped and FS improved, however trended back up with continuation of tube feeds.   - NPH resumed, however FS continues to wax and wane and case discussed with endocrine   - morning hypoglycemic episode (3/11), changed lantus to 8 per endocrine. Continue with lispro 5 with ISS for now.   - Sugars rising after lowering Lantus dose, will follow up with endocrine   - Monitor FS and adjust as needed  - Endocrine following     ETHICS/ GOC    - Palliative discussion on 3/5  - Remains FULL CODE     DISPO - Back to NH when able

## 2025-03-14 NOTE — PROGRESS NOTE ADULT - ASSESSMENT
75 yo M with hx trach/vent (last changed 10/23/24), CVA x2 with residual R hemiplegia, COPD, ESRD on HD MWF (last 2/17), pressure ulcer presenting to [facility] ED for leukocytosis. Patient was initially up in the cath lab earlier today with vascular surgery for fistulogram and noted to have a WBC of 18 and sent to ED. Patient nonverbal at baseline, history provided by son. Per wife, patient at normal baseline, somewhat able to make needs known and is not complaining of any pain. Endocrinology was consulted for management of diabetes mellitus type 2 - patient on TF with hyperglycemia.     Type 2 Diabetes Mellitus  - HbA1c 6.4   - home regimen: on basal/bolus and on TF outpatient - as per wife, he was on humalog 2 units TID and flextouch pen (tresiba?) 6 units at bedtime   - from [facility] - please confirm what [facility] has been doing with regards to pt insulin regimen.      Inpatient plan   - FS goal 100-180 mg/dl   - On bolus tube feeds 290 mL every 6 hours  - continue Lantus 12 units at bedtime  - Continue Admelog 6 units every 6 hours.  Hold if tube feeds are on hold.  - continue low Admelog correctional scale q6  - FS q6  - Please update endocrine if any changes to tube feeding    Discharge plan  - If on bolus feeds on discharge, continue basal/bolus regimen, doses TBD.          Hypertension  - BP goal <130/80  - continue norvasc, labetalol and hydralazine  - Management as per primary team    Hyperlipidemia  - LDL goal <70   - continue statin   - management per primary team         Vanna Sampson, GLORIAP-BC  Nurse Practitioner  Division of Endocrinology & Diabetes  Available via Microsoft Teams

## 2025-03-14 NOTE — PROGRESS NOTE ADULT - NS ATTEND AMEND GEN_ALL_CORE FT
74M w/ MHx ESRD on HD via fistula, HTN, DMII, and recent prolonged hospitalization (4-6/2024) for baclofen toxicity and acute ischemic CVA of the left talya/cerebellum c/b acute hypoxemic and hypercapnic respiratory failure s/p ETT intubation/MV with failure to wean from ventilator s/p tracheostomy with hospital course further complicated by aspiration and B cereus bacteremia and RLE DVT followed by citrobacter PNA, GIB i/s/o AOCD, and fistula stenosis s/p fistulogram 6/4 ultimately transferred to acute rehab, decannulated, and discharged home until 10/2024 when pt returned to OSH with aspiration PNA c/b hypoxemic respiratory failure requiring ETT intubation/MV followed by tracheostomy placement and d/c to LT ([facility]) 11/2024 now presenting to [facility] on 2/18/25 for RUE fistulogram/angioplasty with vascular sx but with hospital course c/b concern fo recurrent PNA with uncontrolled hyperglycemia admitted to RCU for further medical management.    Per vascular no further intervention, no need to be on full a/c 2/2 to fistula bleeding. Not tolerating full a/c.     Completed course of abx but now with fungemia, MRSE bactermia and enteroccus bacteremia. CT with dense consolidation.   Repeat Wound care eval. TTE without signs of endocarditis.       # Encephalopathy 2/2 CVA  # Acute on chronic hypoxemic and hypercapnic respiratory failure. S/P Trach now x2.   # Mucus plugging, atelecatasis  # oropharyngeal dysphagia   # GIB  # Bleeding AV fistula  # pressure ulcer.   # ESRD on HD  # DVT  # C. auris fungemia  # Bacteremia.   - CVA with recent strokes, c/w statins, unable to tolerate full a/c  - c/w vent, previously decannulated now retrached. Unable to come off vent. C/W albuteral and NS nebs. PS as tolerated.   - C/W peg tube feeds  - No longer with GIB, monitor CBC  - Bleeding AV fistula, per vascular no acute interventions are planned, Recommending against full a/c. Not tolerating 2/2 to bleeding.   - Wound care for pressure ulcers-  - Per vascular DVT likely chronic,  - HD per renal  - Fevers, s/p cultures now with fungemia, bacteremia. Repeat TTE without signs of endocarditis. Pending repeat culture for clearance. Start caspo, vanc, zosyn. F/U further ID recs. Monitor levels.   - DVT ppx- SQH  - Dispo- Full code. Prognosis overall is poor. Update wife via phone.

## 2025-03-14 NOTE — PROGRESS NOTE ADULT - ASSESSMENT
74-year-old male hx trach/vent - , CVA, COPD, stage renal disease on dialysis 4 times a week (MTRF) history of pressure ulcer.  Patient presents the ED today for elevated white count. nephrology consulted for dialysis needs    ESRD:  from [facility]  On HD MTTsF via an A-V fistula. s/p fistulogram by vascular 2/18  Continue MWF in hospital  Prolonged bleeding post HD -- vascular consulted. no plan for intervention.  s/p hd 3/12 uf 2L.  seen on HD today; tolerated UF of 2.5L.  Received 1unit PRBC post HD today.  PUF as needed in view of UE edema.  consent from wife in dialysis unit.  monitor bmp    Hypertension  BP fluctuating; acceptable.  continue norvasc 10 daily and hydralazine increased to 50 tid 3/2  C/W hydralazine 75mg TID.  max uf as tolerated  monitor BP    Anemia:  s/p 1 unit PRBC 2/23  epo with hd  iron sat >20  Given 1 unit of PRBC again today.  monitor Hgb  Transfuse for Hg < 7.    leukocytosis  sepsis work up per team  On Zosyn and now vancomycin.  Monitor trough level.    hyponatremia  in setting of esrd and hyperglycemia  optimize dm control  hd per schedule with UF  monitor Na    Hypokalemia  Dialyze w/ 4K bath   supplement kcl 40meq if k<3.4  Monitor K.    Hypermagnesemia  HD per schedule.  Monitor Mg.    CKD - MBD:  PTH 25 - low for CKD stage.  Received PhosNaK x1 on 3/8.  PO4 improved.  Monitor PO4 and Ca daily.

## 2025-03-14 NOTE — PROGRESS NOTE ADULT - ASSESSMENT
75 y/o M PMhx trach/vent (last changed 10/23/24), CVA x2 with residual R hemiplegia, COPD, ESRD on HD MWF who presented to ED for leukocytosis when initially planned for fistulogram and noted to have a WBC of 18.   he was treated for E faecium UTI, and  pseudomonas VAP s/p cefepime, completed 2/25  he developed fever and was started on zosyn  bronch cx grew pseudomonas    E faecium bacteremia, candidemia  blood cx from 3/12- PCR positive for candida auris and E faecium, MRSE  - no Van genes detected for vancomycin resistance by PCR  CT C/A/P- Multifocal pneumonia. Fluid and debris in the trachea and bronchi, left greater than right. Aspiration is a consideration. Infiltration of the soft tissues posterior to the bilateral ischia and right greater trochanter, without evidence of underlying drainable fluid collection.  TTE without evidence of vegetations  s/p optho eval- no evidence of endophthalmitis    pseudomonas PNA- treated  trach aspirate  pseudomonas  s/p cefepime x 7 days, completed 2/25  s/p zosyn x 7 days, completed 3/10    DVT  RUE US- age-indeterminate, non-occlusive deep vein thrombosis noted in the right brachial vein  on eliquis    ESRD  HD per nephrology    Recommendations  c/w zosyn  - plan to complete another 7 day course w/ last day 3/18  c/w caspofungin  c/w vancomycin   - give 750mg post-HD today and check level prior to HD on monday  repeat blood cultures today (blood cx from 3/13 done prior to vanc and caspo)  aspiration precautions  contact isolation for candida auris    Steven Torres M.D.  [facility]  [phone]  After 5pm on weekdays and all day on weekends - please call [phone]  Available on micro[employer] teams    Thank you for consulting us and involving us in the management of this patients case. In addition to reviewing history, imaging, documents, labs, microbiology, took into account antibiotic stewardship, local antibiogram and infection control strategies and potential transmission issues.

## 2025-03-14 NOTE — CHART NOTE - NSCHARTNOTEFT_GEN_A_CORE
NUTRITION FOLLOW-UP NOTE    Nutrition Follow Up for malnutrition    Medical Course:  75 YO M with PMHx of COPD, 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 Patent recently admitted in 6/2024 for left pontine and cerebellar CVA requiring tracheostomy. While at [facility] patient decannulated and passed SS with advanced diet to easy to chew with thin liquids, but complicated COVID requiring transfer to [facility] in 7/2024 and then ultimately discharged home. Per wife patient was doing well at home until 10/2024 when he was found with RLL with concern for aspiration requiring intubation, then tracheostomy, and ultimately discharged to NH with vent dependence in 11/2024. Patient now presented for RUE fistulogram and angioplasty with vascular, however course complicated by leukocytosis with concern for recurrent trachitis vs PNA and UTI, AOCD and hyperglycemia. Admitted to [facility] for further management. Found with  PSA trachitis/ PNA and enterococcus faecium UTI. Course complicated by RUE brachial DVT and hypoxia with HD with concern for volume down vs PE? Volume removal held and hypoxia improved. AC given empirically and CTA CHEST with no PE. Case discussed with vascular and since brachial DVT appears old no need for chronic AC . Course further complicated by PSA LLL PNA and continued on zosyn.      Diet Order:   NPO with Tube Feed:   Tube Feeding Modality: Gastrostomy  Nepro with Carb Steady (NEPRORTH)  Total Volume for 24 Hours (mL): 1160  Bolus  Total Volume of Bolus (mL):  290  Total # of Feeds: 4  Tube Feed Frequency: Every 6 hours   Tube Feed Start Time: 12:00  Bolus Feed Rate (mL per Hour): 290   Bolus Feed Duration (in Hours): 0  Liquid Protein Supplement     Qty per Day:  1 (02-25-25 @ 13:43) [Active]    Nutrition Course:    Pt continues to receive and tolerate EN regimen via PEG with Nepro Carb Steady, bolus feeds to provide 1160 mL total volume = 2088 kcal, 94 gm pro  + LPS providing and additional 15 gm pro, total daily pro intake = 109 gm.   This regimen provides 32 kcal + 1.67 gm pro (based on upper IBW 65kg).   Noted hypoglycemia to 60 mg/dL 3/11, followed by Endocrine.   Last BM 3/14. Weights remains stable since last nutrition follow up and noted pre and post HD wt trends.   Continues with altered skin integrity/  Unstagebale pressure injuries.       Pertinent Medications:   MEDICATIONS  (STANDING):  albuterol    0.083% 2.5 milliGRAM(s) Nebulizer every 6 hours  amLODIPine   Tablet 10 milliGRAM(s) Oral daily  AQUAPHOR (petrolatum Ointment) 1 Application(s) Topical two times a day  atorvastatin 20 milliGRAM(s) Oral at bedtime  caspofungin IVPB 50 milliGRAM(s) IV Intermittent every 24 hours  caspofungin IVPB      chlorhexidine 0.12% Liquid 15 milliLiter(s) Oral Mucosa every 12 hours  chlorhexidine 2% Cloths 1 Application(s) Topical daily  dextrose 5%. 1000 milliLiter(s) (100 mL/Hr) IV Continuous <Continuous>  dextrose 5%. 1000 milliLiter(s) (50 mL/Hr) IV Continuous <Continuous>  dextrose 50% Injectable 25 Gram(s) IV Push once  dextrose 50% Injectable 12.5 Gram(s) IV Push once  dextrose 50% Injectable 25 Gram(s) IV Push once  epoetin reilly-epbx (RETACRIT) Injectable 96592 Unit(s) IV Push <User Schedule>  ferrous    sulfate Liquid 300 milliGRAM(s) Enteral Tube daily  glucagon  Injectable 1 milliGRAM(s) IntraMuscular once  heparin   Injectable 5000 Unit(s) SubCutaneous every 12 hours  hydrALAZINE 75 milliGRAM(s) Oral three times a day  insulin glargine Injectable (LANTUS) 12 Unit(s) SubCutaneous at bedtime  insulin lispro (ADMELOG) corrective regimen sliding scale   SubCutaneous every 6 hours  insulin lispro Injectable (ADMELOG) 6 Unit(s) SubCutaneous every 6 hours  labetalol 100 milliGRAM(s) Oral two times a day  Nephro-noam 1 Tablet(s) Oral daily  pantoprazole   Suspension 40 milliGRAM(s) Oral before breakfast  piperacillin/tazobactam IVPB.. 3.375 Gram(s) IV Intermittent every 12 hours  sodium chloride 0.9% for Nebulization 3 milliLiter(s) Nebulizer every 6 hours  vancomycin  IVPB 750 milliGRAM(s) IV Intermittent once    MEDICATIONS  (PRN):  dextrose Oral Gel 15 Gram(s) Oral once PRN Blood Glucose LESS THAN 70 milliGRAM(s)/deciliter  sodium chloride 0.9% Bolus. 100 milliLiter(s) IV Bolus every 5 minutes PRN SBP LESS THAN or EQUAL to 80 mmHg    Pertinent Labs:  03-14 Na131 mmol/L[L] Glu 159 mg/dL[H] K+ 3.8 mmol/L Cr  3.18 mg/dL[H] BUN 61 mg/dL[H] 03-14 Phos 3.0 mg/dL  A1C with Estimated Average Glucose Result: 6.4 % (02-19-25 @ 06:45)  A1C with Estimated Average Glucose Result: 4.8 % (10-05-24 @ 08:37)  CAPILLARY BLOOD GLUCOSE  POCT Blood Glucose.: 119 mg/dL (14 Mar 2025 11:14)  POCT Blood Glucose.: 159 mg/dL (14 Mar 2025 04:55)  POCT Blood Glucose.: 204 mg/dL (13 Mar 2025 23:45)  POCT Blood Glucose.: 189 mg/dL (13 Mar 2025 17:05)    HEIGHT: 5'4" / 162.6 cm   IBW:  143 lbs/ 65 kg      BMI = 22.7 kg/m^2 (BASED ON CURRENT WT)     Weight Trend: kg   (3/5) 60.4   58.4  (3/7) 60.4   58.4  (3/7) 58.4  (3/10) 61  59.9     Physical Assessment:   Edema: No edema identified as per nursing flow sheet records    Skin: : Left and Right Ischium Unstageable            Sacrum Stage 2 Pressure Injury    Estimated Needs:   [X] RE ASSESSED based on UIBW 65 kg/ 143 lbs.   1950 - 2275 kcal (30-35 kcal/kg)  104-117 gm (1.6-1.8 gm pro/kg)    Previous Nutrition Diagnosis:   Severe Malnutrition.  Nutrition Dx is ongoing.     Education:   [x] Not applicable secondary to decreased cognition     Interventions:      1. Suggest  continue with current TF as ordered Nepro with Carb Steady (NEPRORTH) Bolus Feed Rate 290 mL X 4 daily = Total Volume for 24 Hours (mL): 1160mL  2. Suggest increase Liquid Protein Supplement (LPS) to 2x daily = 60 mL, 200 kcal, 30 gm pro. To increase pro intakes to 124 gm daily.   3. Please adjust rate/volume/duration/free water provision of enteral feeds in accordance with patient tolerance and needs.     Monitor and Evaluate:   EN tolerance, nutrition related lab values, weight trends, BMs/GI distress, hydration status, skin integrity.        Fabiana Marina RDN, CDN, CDCES   Pager [phone] or MS Teams NUTRITION FOLLOW-UP NOTE    Nutrition Follow Up for malnutrition    Medical Course:  73 YO M with PMHx of COPD, 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 Patent recently admitted in 6/2024 for left pontine and cerebellar CVA requiring tracheostomy. While at [facility] patient decannulated and passed SS with advanced diet to easy to chew with thin liquids, but complicated COVID requiring transfer to [facility] in 7/2024 and then ultimately discharged home. Per wife patient was doing well at home until 10/2024 when he was found with RLL with concern for aspiration requiring intubation, then tracheostomy, and ultimately discharged to NH with vent dependence in 11/2024. Patient now presented for RUE fistulogram and angioplasty with vascular, however course complicated by leukocytosis with concern for recurrent trachitis vs PNA and UTI, AOCD and hyperglycemia. Admitted to RCU for further management. Found with  PSA trachitis/ PNA and enterococcus faecium UTI. Course complicated by RUE brachial DVT and hypoxia with HD with concern for volume down vs PE? Volume removal held and hypoxia improved. AC given empirically and CTA CHEST with no PE. Case discussed with vascular and since brachial DVT appears old no need for chronic AC . Course further complicated by PSA LLL PNA and continued on zosyn.      Diet Order:   NPO with Tube Feed:   Tube Feeding Modality: Gastrostomy  Nepro with Carb Steady (NEPRORTH)  Total Volume for 24 Hours (mL): 1160  Bolus  Total Volume of Bolus (mL):  290  Total # of Feeds: 4  Tube Feed Frequency: Every 6 hours   Tube Feed Start Time: 12:00  Bolus Feed Rate (mL per Hour): 290   Bolus Feed Duration (in Hours): 0  Liquid Protein Supplement     Qty per Day:  1 (02-25-25 @ 13:43) [Active]    Nutrition Course:    Pt continues to receive and tolerate EN regimen via PEG with Nepro Carb Steady, bolus feeds to provide 1160 mL total volume = 2088 kcal, 94 gm pro  + LPS providing and additional 15 gm pro, total daily pro intake = 109 gm.   This regimen provides 32 kcal + 1.67 gm pro (based on upper IBW 65kg).   Noted hypoglycemia to 60 mg/dL 3/11, followed by Endocrine.   Last BM 3/14. Weights remains stable since last nutrition follow up and noted pre and post HD wt trends.   Continues with altered skin integrity/  Unstagebale pressure injuries.     Pertinent Medications:   MEDICATIONS  (STANDING):  albuterol    0.083% 2.5 milliGRAM(s) Nebulizer every 6 hours  amLODIPine   Tablet 10 milliGRAM(s) Oral daily  AQUAPHOR (petrolatum Ointment) 1 Application(s) Topical two times a day  atorvastatin 20 milliGRAM(s) Oral at bedtime  caspofungin IVPB 50 milliGRAM(s) IV Intermittent every 24 hours  caspofungin IVPB      chlorhexidine 0.12% Liquid 15 milliLiter(s) Oral Mucosa every 12 hours  chlorhexidine 2% Cloths 1 Application(s) Topical daily  dextrose 5%. 1000 milliLiter(s) (100 mL/Hr) IV Continuous <Continuous>  dextrose 5%. 1000 milliLiter(s) (50 mL/Hr) IV Continuous <Continuous>  dextrose 50% Injectable 25 Gram(s) IV Push once  dextrose 50% Injectable 12.5 Gram(s) IV Push once  dextrose 50% Injectable 25 Gram(s) IV Push once  epoetin reilly-epbx (RETACRIT) Injectable 40742 Unit(s) IV Push <User Schedule>  ferrous    sulfate Liquid 300 milliGRAM(s) Enteral Tube daily  glucagon  Injectable 1 milliGRAM(s) IntraMuscular once  heparin   Injectable 5000 Unit(s) SubCutaneous every 12 hours  hydrALAZINE 75 milliGRAM(s) Oral three times a day  insulin glargine Injectable (LANTUS) 12 Unit(s) SubCutaneous at bedtime  insulin lispro (ADMELOG) corrective regimen sliding scale   SubCutaneous every 6 hours  insulin lispro Injectable (ADMELOG) 6 Unit(s) SubCutaneous every 6 hours  labetalol 100 milliGRAM(s) Oral two times a day  Nephro-noam 1 Tablet(s) Oral daily  pantoprazole   Suspension 40 milliGRAM(s) Oral before breakfast  piperacillin/tazobactam IVPB.. 3.375 Gram(s) IV Intermittent every 12 hours  sodium chloride 0.9% for Nebulization 3 milliLiter(s) Nebulizer every 6 hours  vancomycin  IVPB 750 milliGRAM(s) IV Intermittent once    MEDICATIONS  (PRN):  dextrose Oral Gel 15 Gram(s) Oral once PRN Blood Glucose LESS THAN 70 milliGRAM(s)/deciliter  sodium chloride 0.9% Bolus. 100 milliLiter(s) IV Bolus every 5 minutes PRN SBP LESS THAN or EQUAL to 80 mmHg    Pertinent Labs:  03-14 Na131 mmol/L[L] Glu 159 mg/dL[H] K+ 3.8 mmol/L Cr  3.18 mg/dL[H] BUN 61 mg/dL[H] 03-14 Phos 3.0 mg/dL  A1C with Estimated Average Glucose Result: 6.4 % (02-19-25 @ 06:45)  A1C with Estimated Average Glucose Result: 4.8 % (10-05-24 @ 08:37)  CAPILLARY BLOOD GLUCOSE  POCT Blood Glucose.: 119 mg/dL (14 Mar 2025 11:14)  POCT Blood Glucose.: 159 mg/dL (14 Mar 2025 04:55)  POCT Blood Glucose.: 204 mg/dL (13 Mar 2025 23:45)  POCT Blood Glucose.: 189 mg/dL (13 Mar 2025 17:05)    HEIGHT: 5'4" / 162.6 cm  IBW:  143 lbs/ 65 kg  BMI = 22.7 kg/m^2 (BASED ON CURRENT WT)     Weight Trend: kg   (3/5) 60.4   58.4  (3/7) 60.4   58.4  (3/7) 58.4  (3/10) 61  59.9     Physical Assessment:   Edema: No edema identified as per nursing flow sheet records    Skin: : Left and Right Ischium Unstageable            Sacrum Stage 2 Pressure Injury    Estimated Needs:   [X] RE ASSESSED based on UIBW 65 kg/ 143 lbs.   1950 - 2275 kcal (30-35 kcal/kg)  104-117 gm (1.6-1.8 gm pro/kg)    Previous Nutrition Diagnosis:   Severe Malnutrition.  Nutrition Dx is ongoing.     Education:   [x] Not applicable secondary to decreased cognition     Interventions:      1. Suggest  continue with current TF as ordered Nepro with Carb Steady (NEPRORTH) Bolus Feed Rate 290 mL X 4 daily = Total Volume for 24 Hours (mL): 1160mL  2. Suggest increase Liquid Protein Supplement (LPS) to 2x daily = 60 mL, 200 kcal, 30 gm pro. To increase pro intakes to 124 gm daily.   3. Please adjust rate/volume/duration/free water provision of enteral feeds in accordance with patient tolerance and needs.     Monitor and Evaluate:   EN tolerance, nutrition related lab values, weight trends, BMs/GI distress, hydration status, skin integrity.        Fabiana Marina RDN, CDN, CDCES   Pager [phone] or MS Teams

## 2025-03-14 NOTE — CHART NOTE - NSCHARTNOTEFT_GEN_A_CORE
Provider spoke to pt wife Pauline Art [phone] and updated wife regarding blood transfusion for hemoglobin 6.8 today. All questions answered.    Adams Rowley PA-C,   Internal Medicine ACP   Respiratory Care Unit   Spectra [phone]

## 2025-03-14 NOTE — PROGRESS NOTE ADULT - SUBJECTIVE AND OBJECTIVE BOX
Chief Complaint: DM type 2     Interval Events: FS at goal. Tube feeding ongoing.     MEDICATIONS  (STANDING):  albuterol    0.083% 2.5 milliGRAM(s) Nebulizer every 6 hours  amLODIPine   Tablet 10 milliGRAM(s) Oral daily  AQUAPHOR (petrolatum Ointment) 1 Application(s) Topical two times a day  atorvastatin 20 milliGRAM(s) Oral at bedtime  caspofungin IVPB 50 milliGRAM(s) IV Intermittent every 24 hours  caspofungin IVPB      chlorhexidine 0.12% Liquid 15 milliLiter(s) Oral Mucosa every 12 hours  chlorhexidine 2% Cloths 1 Application(s) Topical daily  dextrose 5%. 1000 milliLiter(s) (100 mL/Hr) IV Continuous <Continuous>  dextrose 5%. 1000 milliLiter(s) (50 mL/Hr) IV Continuous <Continuous>  dextrose 50% Injectable 25 Gram(s) IV Push once  dextrose 50% Injectable 12.5 Gram(s) IV Push once  dextrose 50% Injectable 25 Gram(s) IV Push once  epoetin reilly-epbx (RETACRIT) Injectable 67186 Unit(s) IV Push <User Schedule>  ferrous    sulfate Liquid 300 milliGRAM(s) Enteral Tube daily  glucagon  Injectable 1 milliGRAM(s) IntraMuscular once  heparin   Injectable 5000 Unit(s) SubCutaneous every 12 hours  hydrALAZINE 75 milliGRAM(s) Oral three times a day  insulin glargine Injectable (LANTUS) 12 Unit(s) SubCutaneous at bedtime  insulin lispro (ADMELOG) corrective regimen sliding scale   SubCutaneous every 6 hours  insulin lispro Injectable (ADMELOG) 6 Unit(s) SubCutaneous every 6 hours  labetalol 100 milliGRAM(s) Oral two times a day  Nephro-noam 1 Tablet(s) Oral daily  pantoprazole   Suspension 40 milliGRAM(s) Oral before breakfast  piperacillin/tazobactam IVPB.. 3.375 Gram(s) IV Intermittent every 12 hours  sodium chloride 0.9% for Nebulization 3 milliLiter(s) Nebulizer every 6 hours  vancomycin  IVPB 750 milliGRAM(s) IV Intermittent once    MEDICATIONS  (PRN):  dextrose Oral Gel 15 Gram(s) Oral once PRN Blood Glucose LESS THAN 70 milliGRAM(s)/deciliter  sodium chloride 0.9% Bolus. 100 milliLiter(s) IV Bolus every 5 minutes PRN SBP LESS THAN or EQUAL to 80 mmHg    Allergies    No Known Allergies    Intolerances    Unable to obtain ROS, patient A&Ox0.     VITALS: T(C): 35.8 (03-14-25 @ 13:02)  T(F): 96.5 (03-14-25 @ 13:02), Max: 98.7 (03-13-25 @ 20:00)  HR: 71 (03-14-25 @ 13:02) (65 - 88)  BP: 134/69 (03-14-25 @ 13:02) (134/69 - 173/60)  RR:  (16 - 18)  SpO2:  (96% - 100%)  Wt(kg): --      Physical Exam:   GENERAL: NAD  RESPIRATORY: trache to vent   GI: Soft, non distended  NEURO: A&Ox0     CAPILLARY BLOOD GLUCOSE  POCT Blood Glucose.: 119 mg/dL (14 Mar 2025 11:14)  POCT Blood Glucose.: 159 mg/dL (14 Mar 2025 04:55)  POCT Blood Glucose.: 204 mg/dL (13 Mar 2025 23:45)  POCT Blood Glucose.: 189 mg/dL (13 Mar 2025 17:05)      03-14    131[L]  |  90[L]  |  61[H]  ----------------------------<  159[H]  3.8   |  26  |  3.18[H]    eGFR: 20[L]    Ca    9.6      03-14  Mg     2.60     03-14  Phos  3.0     03-14        A1C with Estimated Average Glucose Result: 6.4 % (02-19-25 @ 06:45)  A1C with Estimated Average Glucose Result: 4.8 % (10-05-24 @ 08:37)  A1C with Estimated Average Glucose Result: 5.9 % (07-16-24 @ 10:25)  A1C with Estimated Average Glucose Result: 6.9 % (04-30-24 @ 06:03)

## 2025-03-14 NOTE — PROGRESS NOTE ADULT - SUBJECTIVE AND OBJECTIVE BOX
CHIEF COMPLAINT: Patient is a 74y old  Male who presents with a chief complaint of 2/18/25 was in cath lab earlier today with vascular surgery for fistulogram and noted to have a WBC of 18 and sent to ED and admitted (04 Mar 2025 12:22)      INTERVAL EVENTS:   - no acute events overnight, VSS, afebrile on the ventilator  - continued on Vanco by level, Caspo and Zosyn for MRSE/canadida/enterococcus bacteremia with rpt cultures pending     ROS: Seen by bedside during AM rounds     OBJECTIVE:  ICU Vital Signs Last 24 Hrs  T(C): 36.8 (14 Mar 2025 04:00), Max: 37.2 (13 Mar 2025 08:00)  T(F): 98.3 (14 Mar 2025 04:00), Max: 98.9 (13 Mar 2025 08:00)  HR: 71 (14 Mar 2025 05:19) (71 - 89)  BP: 154/91 (14 Mar 2025 05:19) (151/59 - 190/65)  BP(mean): 89 (13 Mar 2025 17:31) (89 - 99)  ABP: --  ABP(mean): --  RR: 16 (14 Mar 2025 04:00) (16 - 18)  SpO2: 99% (14 Mar 2025 04:00) (96% - 100%)    O2 Parameters below as of 14 Mar 2025 04:00  Patient On (Oxygen Delivery Method): ventilator    O2 Concentration (%): 40      Mode: AC/ CMV (Assist Control/ Continuous Mandatory Ventilation), RR (machine): 15, TV (machine): 400, FiO2: 40, PEEP: 6, ITime: 0.72, MAP: 8, PIP: 24    CAPILLARY BLOOD GLUCOSE      POCT Blood Glucose.: 159 mg/dL (14 Mar 2025 04:55)      PHYSICAL EXAM:  General:   HEENT:   Lymph Nodes:  Neck:   Respiratory:   Cardiovascular:   Abdomen:   Extremities:   Skin:   Neurological:  Psychiatry:    Mode: AC/ CMV (Assist Control/ Continuous Mandatory Ventilation)  RR (machine): 15  TV (machine): 400  FiO2: 40  PEEP: 6  ITime: 0.72  MAP: 8  PIP: 24      HOSPITAL MEDICATIONS:  MEDICATIONS  (STANDING):  albuterol    0.083% 2.5 milliGRAM(s) Nebulizer every 6 hours  amLODIPine   Tablet 10 milliGRAM(s) Oral daily  AQUAPHOR (petrolatum Ointment) 1 Application(s) Topical two times a day  atorvastatin 20 milliGRAM(s) Oral at bedtime  caspofungin IVPB 50 milliGRAM(s) IV Intermittent every 24 hours  caspofungin IVPB      chlorhexidine 0.12% Liquid 15 milliLiter(s) Oral Mucosa every 12 hours  chlorhexidine 2% Cloths 1 Application(s) Topical daily  dextrose 5%. 1000 milliLiter(s) (100 mL/Hr) IV Continuous <Continuous>  dextrose 5%. 1000 milliLiter(s) (50 mL/Hr) IV Continuous <Continuous>  dextrose 50% Injectable 25 Gram(s) IV Push once  dextrose 50% Injectable 12.5 Gram(s) IV Push once  dextrose 50% Injectable 25 Gram(s) IV Push once  epoetin reilly-epbx (RETACRIT) Injectable 24901 Unit(s) IV Push <User Schedule>  ferrous    sulfate Liquid 300 milliGRAM(s) Enteral Tube daily  glucagon  Injectable 1 milliGRAM(s) IntraMuscular once  heparin   Injectable 5000 Unit(s) SubCutaneous every 12 hours  hydrALAZINE 75 milliGRAM(s) Oral three times a day  insulin glargine Injectable (LANTUS) 12 Unit(s) SubCutaneous at bedtime  insulin lispro (ADMELOG) corrective regimen sliding scale   SubCutaneous every 6 hours  insulin lispro Injectable (ADMELOG) 6 Unit(s) SubCutaneous every 6 hours  labetalol 100 milliGRAM(s) Oral two times a day  Nephro-noam 1 Tablet(s) Oral daily  pantoprazole   Suspension 40 milliGRAM(s) Oral before breakfast  piperacillin/tazobactam IVPB.. 3.375 Gram(s) IV Intermittent every 12 hours  sodium chloride 0.9% for Nebulization 3 milliLiter(s) Nebulizer every 6 hours    MEDICATIONS  (PRN):  dextrose Oral Gel 15 Gram(s) Oral once PRN Blood Glucose LESS THAN 70 milliGRAM(s)/deciliter  sodium chloride 0.9% Bolus. 100 milliLiter(s) IV Bolus every 5 minutes PRN SBP LESS THAN or EQUAL to 80 mmHg      LABS:                        7.7    18.65 )-----------( 326      ( 13 Mar 2025 06:30 )             25.1     03-13    135  |  93[L]  |  40[H]  ----------------------------<  212[H]  3.3[L]   |  27  |  2.38[H]    Ca    9.7      13 Mar 2025 06:30  Phos  2.5     03-13  Mg     2.50     03-13        Urinalysis Basic - ( 13 Mar 2025 06:30 )    Color: x / Appearance: x / SG: x / pH: x  Gluc: 212 mg/dL / Ketone: x  / Bili: x / Urobili: x   Blood: x / Protein: x / Nitrite: x   Leuk Esterase: x / RBC: x / WBC x   Sq Epi: x / Non Sq Epi: x / Bacteria: x         CHIEF COMPLAINT: Patient is a 74y old  Male who presents with a chief complaint of 2/18/25 was in cath lab earlier today with vascular surgery for fistulogram and noted to have a WBC of 18 and sent to ED and admitted (04 Mar 2025 12:22)      INTERVAL EVENTS:   - no acute events overnight, VSS, afebrile on the ventilator  - continued on Vanco by level, Caspo and Zosyn for MRSE/canadida/enterococcus bacteremia with rpt cultures pending     ROS: Seen by bedside during AM rounds, unable to obtain d/t ventilator     OBJECTIVE:  ICU Vital Signs Last 24 Hrs  T(C): 36.8 (14 Mar 2025 04:00), Max: 37.2 (13 Mar 2025 08:00)  T(F): 98.3 (14 Mar 2025 04:00), Max: 98.9 (13 Mar 2025 08:00)  HR: 71 (14 Mar 2025 05:19) (71 - 89)  BP: 154/91 (14 Mar 2025 05:19) (151/59 - 190/65)  BP(mean): 89 (13 Mar 2025 17:31) (89 - 99)  ABP: --  ABP(mean): --  RR: 16 (14 Mar 2025 04:00) (16 - 18)  SpO2: 99% (14 Mar 2025 04:00) (96% - 100%)    O2 Parameters below as of 14 Mar 2025 04:00  Patient On (Oxygen Delivery Method): ventilator    O2 Concentration (%): 40      Mode: AC/ CMV (Assist Control/ Continuous Mandatory Ventilation), RR (machine): 15, TV (machine): 400, FiO2: 40, PEEP: 6, ITime: 0.72, MAP: 8, PIP: 24    CAPILLARY BLOOD GLUCOSE      POCT Blood Glucose.: 159 mg/dL (14 Mar 2025 04:55)      PHYSICAL EXAM:  General: NAD, undergoing HD session  Neck: neck supple, no JVD, tracheostomy tube is midline  Respiratory: +trach to ventilator on PS, +coarse breath sounds b/l, no wheezing, normal respiratory effort  Cardiovascular: normal s1, s2, RRR  Abdomen: soft, non tender, +PEG  Extremities: no LE edema b/l  Skin: warm, dry  Neurological: Awake, eyes open spontaneously, tracks with eyes, follows commands   Psychiatry: no agitation      Mode: AC/ CMV (Assist Control/ Continuous Mandatory Ventilation)  RR (machine): 15  TV (machine): 400  FiO2: 40  PEEP: 6  ITime: 0.72  MAP: 8  PIP: 24      HOSPITAL MEDICATIONS:  MEDICATIONS  (STANDING):  albuterol    0.083% 2.5 milliGRAM(s) Nebulizer every 6 hours  amLODIPine   Tablet 10 milliGRAM(s) Oral daily  AQUAPHOR (petrolatum Ointment) 1 Application(s) Topical two times a day  atorvastatin 20 milliGRAM(s) Oral at bedtime  caspofungin IVPB 50 milliGRAM(s) IV Intermittent every 24 hours  caspofungin IVPB      chlorhexidine 0.12% Liquid 15 milliLiter(s) Oral Mucosa every 12 hours  chlorhexidine 2% Cloths 1 Application(s) Topical daily  dextrose 5%. 1000 milliLiter(s) (100 mL/Hr) IV Continuous <Continuous>  dextrose 5%. 1000 milliLiter(s) (50 mL/Hr) IV Continuous <Continuous>  dextrose 50% Injectable 25 Gram(s) IV Push once  dextrose 50% Injectable 12.5 Gram(s) IV Push once  dextrose 50% Injectable 25 Gram(s) IV Push once  epoetin reilly-epbx (RETACRIT) Injectable 54491 Unit(s) IV Push <User Schedule>  ferrous    sulfate Liquid 300 milliGRAM(s) Enteral Tube daily  glucagon  Injectable 1 milliGRAM(s) IntraMuscular once  heparin   Injectable 5000 Unit(s) SubCutaneous every 12 hours  hydrALAZINE 75 milliGRAM(s) Oral three times a day  insulin glargine Injectable (LANTUS) 12 Unit(s) SubCutaneous at bedtime  insulin lispro (ADMELOG) corrective regimen sliding scale   SubCutaneous every 6 hours  insulin lispro Injectable (ADMELOG) 6 Unit(s) SubCutaneous every 6 hours  labetalol 100 milliGRAM(s) Oral two times a day  Nephro-noam 1 Tablet(s) Oral daily  pantoprazole   Suspension 40 milliGRAM(s) Oral before breakfast  piperacillin/tazobactam IVPB.. 3.375 Gram(s) IV Intermittent every 12 hours  sodium chloride 0.9% for Nebulization 3 milliLiter(s) Nebulizer every 6 hours    MEDICATIONS  (PRN):  dextrose Oral Gel 15 Gram(s) Oral once PRN Blood Glucose LESS THAN 70 milliGRAM(s)/deciliter  sodium chloride 0.9% Bolus. 100 milliLiter(s) IV Bolus every 5 minutes PRN SBP LESS THAN or EQUAL to 80 mmHg      LABS:                        7.7    18.65 )-----------( 326      ( 13 Mar 2025 06:30 )             25.1     03-13    135  |  93[L]  |  40[H]  ----------------------------<  212[H]  3.3[L]   |  27  |  2.38[H]    Ca    9.7      13 Mar 2025 06:30  Phos  2.5     03-13  Mg     2.50     03-13        Urinalysis Basic - ( 13 Mar 2025 06:30 )    Color: x / Appearance: x / SG: x / pH: x  Gluc: 212 mg/dL / Ketone: x  / Bili: x / Urobili: x   Blood: x / Protein: x / Nitrite: x   Leuk Esterase: x / RBC: x / WBC x   Sq Epi: x / Non Sq Epi: x / Bacteria: x

## 2025-03-14 NOTE — PROGRESS NOTE ADULT - SUBJECTIVE AND OBJECTIVE BOX
[facility]  AUGUST Carbajal Y. Patel, S. Shah, G. Casimir  [phone]  ([phone] - weekdays after 5pm and weekends)    Name: JIMMY BLAND  Age/Gender: 74y Male  MRN: [id number]    Interval History:  Notes reviewed.   Afebrile.     Allergies: No Known Allergies      Objective:  Vitals:   T(F): 96.4 (03-14-25 @ 09:30), Max: 98.7 (03-13-25 @ 20:00)  HR: 71 (03-14-25 @ 10:52) (65 - 89)  BP: 151/59 (03-14-25 @ 09:30) (151/59 - 179/63)  RR: 17 (03-14-25 @ 09:30) (16 - 18)  SpO2: 99% (03-14-25 @ 10:52) (96% - 100%)  Physical Examination:  General: frail appearing  HEENT: anicteric, tracheostomy, on vent  Cardio: S1, S2, normal rate  Resp: clear to auscultation anteriorly   Abd: Soft. Nondistended. PEG  Ext: no erythema or warmth to R thigh, discoloration from pressure to R thigh, no open wounds  Skin: warm, dry    Laboratory Studies:  CBC:                       7.7    18.65 )-----------( 326      ( 13 Mar 2025 06:30 )             25.1     WBC Trend:  18.65 03-13-25 @ 06:30  18.73 03-12-25 @ 04:53  14.20 03-11-25 @ 05:50  23.01 03-10-25 @ 17:00  21.01 03-09-25 @ 05:20  18.15 03-08-25 @ 05:30  15.35 03-07-25 @ 22:30    CMP: 03-13    135  |  93[L]  |  40[H]  ----------------------------<  212[H]  3.3[L]   |  27  |  2.38[H]    Ca    9.7      13 Mar 2025 06:30  Phos  2.5     03-13  Mg     2.50     03-13            Urinalysis Basic - ( 13 Mar 2025 06:30 )    Color: x / Appearance: x / SG: x / pH: x  Gluc: 212 mg/dL / Ketone: x  / Bili: x / Urobili: x   Blood: x / Protein: x / Nitrite: x   Leuk Esterase: x / RBC: x / WBC x   Sq Epi: x / Non Sq Epi: x / Bacteria: x      Microbiology: reviewed     Culture - Blood (collected 03-12-25 @ 04:52)  Source: Blood Blood-Venous  Gram Stain (03-13-25 @ 06:28):    Growth in anaerobic bottle: Gram Positive Cocci in Clusters and Gram    Positive Cocci in Pairs and Chains  Preliminary Report (03-13-25 @ 20:17):    Growth in anaerobic bottle: Enterococcus faecium Susceptibility to follow.    Growth in anaerobic bottle: Staphylococcus epidermidis "Susceptibilities    not performed"    Direct identification is available within approximately 3-5    hours either by BloodPanel Multiplexed PCR or Direct    MALDI-TOF. Details: [email or web address]  Organism: Blood Culture PCR (03-13-25 @ 09:47)  Organism: Blood Culture PCR (03-13-25 @ 09:47)      Method Type: PCR      -  Enterococcus faecium: Detec      -  Staphylococcus epidermidis, Methicillin resistant: Detec      -  Candida auris: Detec    Culture - Blood (collected 03-12-25 @ 04:40)  Source: Blood Blood-Venous  Preliminary Report (03-14-25 @ 10:01):    No growth at 48 Hours    Culture - Blood (collected 03-09-25 @ 05:20)  Source: Blood Blood-Peripheral  Final Report (03-14-25 @ 09:01):    No growth at 5 days    Culture - Blood (collected 03-09-25 @ 05:05)  Source: Blood Blood-Venous  Final Report (03-14-25 @ 09:01):    No growth at 5 days    Culture - Blood (collected 03-03-25 @ 16:27)  Source: Blood Blood-Peripheral  Final Report (03-08-25 @ 22:01):    No growth at 5 days    Culture - Blood (collected 03-03-25 @ 16:23)  Source: Blood Blood-Venous  Final Report (03-08-25 @ 22:01):    No growth at 5 days    Culture - Sputum (collected 03-03-25 @ 14:10)  Source: Trach Asp Tracheal Aspirate  Gram Stain (03-04-25 @ 04:28):    Few polymorphonuclear leukocytes per low power field    No Squamous epithelial cells per low power field    Moderate Gram Negative Rods seen per oil power field  Final Report (03-05-25 @ 16:12):    Numerous Pseudomonas aeruginosa (Carbapenem Resistant)    Commensal dominick consistent with body site  Organism: Pseudomonas aeruginosa (Carbapenem Resistant) (03-05-25 @ 16:12)  Organism: Pseudomonas aeruginosa (Carbapenem Resistant) (03-05-25 @ 16:12)      Method Type: CarbaR      -  Resistance Gene to Carbapenem: Nondet  Organism: Pseudomonas aeruginosa (Carbapenem Resistant) (03-05-25 @ 16:12)      Method Type: VIDA      -  Aztreonam: S <=4      -  Cefepime: S <=2      -  Ceftazidime: S 4      -  Ceftazidime/Avibactam: S <=4      -  Ceftolozane/tazobactam: S <=2      -  Ciprofloxacin: S <=0.25      -  Imipenem: R 8      -  Levofloxacin: S <=0.5      -  Meropenem: R 8      -  Piperacillin/Tazobactam: S <=8        Radiology: reviewed     Medications:  albuterol    0.083% 2.5 milliGRAM(s) Nebulizer every 6 hours  amLODIPine   Tablet 10 milliGRAM(s) Oral daily  AQUAPHOR (petrolatum Ointment) 1 Application(s) Topical two times a day  atorvastatin 20 milliGRAM(s) Oral at bedtime  caspofungin IVPB 50 milliGRAM(s) IV Intermittent every 24 hours  caspofungin IVPB      chlorhexidine 0.12% Liquid 15 milliLiter(s) Oral Mucosa every 12 hours  chlorhexidine 2% Cloths 1 Application(s) Topical daily  dextrose 5%. 1000 milliLiter(s) IV Continuous <Continuous>  dextrose 5%. 1000 milliLiter(s) IV Continuous <Continuous>  dextrose 50% Injectable 25 Gram(s) IV Push once  dextrose 50% Injectable 12.5 Gram(s) IV Push once  dextrose 50% Injectable 25 Gram(s) IV Push once  dextrose Oral Gel 15 Gram(s) Oral once PRN  epoetin reilly-epbx (RETACRIT) Injectable 64700 Unit(s) IV Push <User Schedule>  ferrous    sulfate Liquid 300 milliGRAM(s) Enteral Tube daily  glucagon  Injectable 1 milliGRAM(s) IntraMuscular once  heparin   Injectable 5000 Unit(s) SubCutaneous every 12 hours  hydrALAZINE 75 milliGRAM(s) Oral three times a day  insulin glargine Injectable (LANTUS) 12 Unit(s) SubCutaneous at bedtime  insulin lispro (ADMELOG) corrective regimen sliding scale   SubCutaneous every 6 hours  insulin lispro Injectable (ADMELOG) 6 Unit(s) SubCutaneous every 6 hours  labetalol 100 milliGRAM(s) Oral two times a day  Nephro-noam 1 Tablet(s) Oral daily  pantoprazole   Suspension 40 milliGRAM(s) Oral before breakfast  piperacillin/tazobactam IVPB.. 3.375 Gram(s) IV Intermittent every 12 hours  sodium chloride 0.9% Bolus. 100 milliLiter(s) IV Bolus every 5 minutes PRN  sodium chloride 0.9% for Nebulization 3 milliLiter(s) Nebulizer every 6 hours    Antimicrobials:  caspofungin IVPB 50 milliGRAM(s) IV Intermittent every 24 hours  caspofungin IVPB      piperacillin/tazobactam IVPB.. 3.375 Gram(s) IV Intermittent every 12 hours

## 2025-03-14 NOTE — PROGRESS NOTE ADULT - SUBJECTIVE AND OBJECTIVE BOX
[facility]   MD ELIANE BHAGAT MD ANGELA WONG, PA QIAN CHEN, [place]    TEL:  OFFICE: [phone]  From 5pm-7am Answering Service [phone]    -- RENAL FOLLOW UP NOTE ---Date of Service 03-14-25 @ 16:22    Patient is a 74y old  Male who presents with a chief complaint leukocytosis.    Patient seen and examined at bedside.  Trached to vent; no respiratory distress.    VITALS:  T(F): 96.5 (03-14-25 @ 13:02), Max: 98.7 (03-13-25 @ 20:00)  HR: 70 (03-14-25 @ 15:40)  BP: 134/69 (03-14-25 @ 13:02)  RR: 16 (03-14-25 @ 13:02)  SpO2: 99% (03-14-25 @ 15:40)  Wt(kg): --    03-14 @ 07:01  -  03-14 @ 16:22  --------------------------------------------------------  IN: 400 mL / OUT: 2900 mL / NET: -2500 mL        PHYSICAL EXAM:  General: NAD  Neck: No JVD  Respiratory: CTAB, no wheezes, rales or rhonchi.  Trached to vent; no respiratory distress.  Cardiovascular: S1, S2, RRR  Gastrointestinal: BS+, soft, NT/ND  Extremities + edema of b/l arms    Hospital Medications:   MEDICATIONS  (STANDING):  albuterol    0.083% 2.5 milliGRAM(s) Nebulizer every 6 hours  amLODIPine   Tablet 10 milliGRAM(s) Oral daily  AQUAPHOR (petrolatum Ointment) 1 Application(s) Topical two times a day  atorvastatin 20 milliGRAM(s) Oral at bedtime  caspofungin IVPB 50 milliGRAM(s) IV Intermittent every 24 hours  caspofungin IVPB      chlorhexidine 0.12% Liquid 15 milliLiter(s) Oral Mucosa every 12 hours  chlorhexidine 2% Cloths 1 Application(s) Topical daily  dextrose 5%. 1000 milliLiter(s) (100 mL/Hr) IV Continuous <Continuous>  dextrose 5%. 1000 milliLiter(s) (50 mL/Hr) IV Continuous <Continuous>  dextrose 50% Injectable 25 Gram(s) IV Push once  dextrose 50% Injectable 12.5 Gram(s) IV Push once  dextrose 50% Injectable 25 Gram(s) IV Push once  epoetin reilly-epbx (RETACRIT) Injectable 13063 Unit(s) IV Push <User Schedule>  ferrous    sulfate Liquid 300 milliGRAM(s) Enteral Tube daily  glucagon  Injectable 1 milliGRAM(s) IntraMuscular once  heparin   Injectable 5000 Unit(s) SubCutaneous every 12 hours  hydrALAZINE 75 milliGRAM(s) Oral three times a day  insulin glargine Injectable (LANTUS) 12 Unit(s) SubCutaneous at bedtime  insulin lispro (ADMELOG) corrective regimen sliding scale   SubCutaneous every 6 hours  insulin lispro Injectable (ADMELOG) 6 Unit(s) SubCutaneous every 6 hours  labetalol 100 milliGRAM(s) Oral two times a day  Nephro-noma 1 Tablet(s) Oral daily  pantoprazole   Suspension 40 milliGRAM(s) Oral before breakfast  piperacillin/tazobactam IVPB.. 3.375 Gram(s) IV Intermittent every 12 hours  sodium chloride 0.9% for Nebulization 3 milliLiter(s) Nebulizer every 6 hours  vancomycin  IVPB 750 milliGRAM(s) IV Intermittent once      LABS:  03-14    131[L]  |  90[L]  |  61[H]  ----------------------------<  159[H]  3.8   |  26  |  3.18[H]    Ca    9.6      14 Mar 2025 09:45  Phos  3.0     03-14  Mg     2.60     03-14      Creatinine Trend: 3.18 <--, 2.38 <--, 3.35 <--, 2.44 <--, 3.98 <--, 3.05 <--, 2.28 <--    Phosphorus: 3.0 mg/dL (03-14 @ 09:45)                          6.8    16.47 )-----------( 289      ( 14 Mar 2025 09:45 )             22.2     Urine Studies:  Urinalysis - [03-14-25 @ 09:45]      Color  / Appearance  / SG  / pH       Gluc 159 / Ketone   / Bili  / Urobili        Blood  / Protein  / Leuk Est  / Nitrite       RBC  / WBC  / Hyaline  / Gran  / Sq Epi  / Non Sq Epi  / Bacteria       Iron 46, TIBC 142, %sat 32      [02-19-25 @ 11:39]  Ferritin 3601      [02-19-25 @ 11:39]  PTH -- (Ca --)      [03-09-25 @ 05:20]   25  PTH -- (Ca --)      [05-26-24 @ 01:20]   122  TSH 1.660      [10-05-24 @ 08:37]  Lipid: chol --, , HDL --, LDL --      [10-18-24 @ 06:00]    HBsAb 654.8      [02-19-25 @ 19:05]  HBsAg Nonreact      [02-19-25 @ 19:05]  HBcAb Nonreact      [02-19-25 @ 19:05]  HCV 0.19, Nonreact      [02-19-25 @ 19:05]

## 2025-03-15 LAB
-  AMPICILLIN: SIGNIFICANT CHANGE UP
-  GENTAMICIN SYNERGY: SIGNIFICANT CHANGE UP
-  STREPTOMYCIN SYNERGY: SIGNIFICANT CHANGE UP
-  VANCOMYCIN: SIGNIFICANT CHANGE UP
ANION GAP SERPL CALC-SCNC: 17 MMOL/L — HIGH (ref 7–14)
BASOPHILS # BLD AUTO: 0.08 K/UL — SIGNIFICANT CHANGE UP (ref 0–0.2)
BASOPHILS NFR BLD AUTO: 0.6 % — SIGNIFICANT CHANGE UP (ref 0–2)
BUN SERPL-MCNC: 40 MG/DL — HIGH (ref 7–23)
CALCIUM SERPL-MCNC: 9.4 MG/DL — SIGNIFICANT CHANGE UP (ref 8.4–10.5)
CO2 SERPL-SCNC: 22 MMOL/L — SIGNIFICANT CHANGE UP (ref 22–31)
CREAT SERPL-MCNC: 2.28 MG/DL — HIGH (ref 0.5–1.3)
EGFR: 30 ML/MIN/1.73M2 — LOW
EOSINOPHIL # BLD AUTO: 0.14 K/UL — SIGNIFICANT CHANGE UP (ref 0–0.5)
EOSINOPHIL NFR BLD AUTO: 1.1 % — SIGNIFICANT CHANGE UP (ref 0–6)
GLUCOSE BLDC GLUCOMTR-MCNC: 178 MG/DL — HIGH (ref 70–99)
GLUCOSE BLDC GLUCOMTR-MCNC: 192 MG/DL — HIGH (ref 70–99)
GLUCOSE BLDC GLUCOMTR-MCNC: 204 MG/DL — HIGH (ref 70–99)
GLUCOSE BLDC GLUCOMTR-MCNC: 242 MG/DL — HIGH (ref 70–99)
GLUCOSE BLDC GLUCOMTR-MCNC: 98 MG/DL — SIGNIFICANT CHANGE UP (ref 70–99)
GLUCOSE SERPL-MCNC: 153 MG/DL — HIGH (ref 70–99)
HCT VFR BLD CALC: 27.9 % — LOW (ref 39–50)
HGB BLD-MCNC: 8.6 G/DL — LOW (ref 13–17)
IANC: 10.72 K/UL — HIGH (ref 1.8–7.4)
IMM GRANULOCYTES NFR BLD AUTO: 1.5 % — HIGH (ref 0–0.9)
LYMPHOCYTES # BLD AUTO: 0.86 K/UL — LOW (ref 1–3.3)
LYMPHOCYTES # BLD AUTO: 6.8 % — LOW (ref 13–44)
MAGNESIUM SERPL-MCNC: 2.4 MG/DL — SIGNIFICANT CHANGE UP (ref 1.6–2.6)
MCHC RBC-ENTMCNC: 24.9 PG — LOW (ref 27–34)
MCHC RBC-ENTMCNC: 30.8 G/DL — LOW (ref 32–36)
MCV RBC AUTO: 80.9 FL — SIGNIFICANT CHANGE UP (ref 80–100)
METHOD TYPE: SIGNIFICANT CHANGE UP
MONOCYTES # BLD AUTO: 0.62 K/UL — SIGNIFICANT CHANGE UP (ref 0–0.9)
MONOCYTES NFR BLD AUTO: 4.9 % — SIGNIFICANT CHANGE UP (ref 2–14)
NEUTROPHILS # BLD AUTO: 10.72 K/UL — HIGH (ref 1.8–7.4)
NEUTROPHILS NFR BLD AUTO: 85.1 % — HIGH (ref 43–77)
NRBC # BLD AUTO: 0.27 K/UL — HIGH (ref 0–0)
NRBC # FLD: 0.27 K/UL — HIGH (ref 0–0)
NRBC BLD AUTO-RTO: 2 /100 WBCS — HIGH (ref 0–0)
PHOSPHATE SERPL-MCNC: 2.3 MG/DL — LOW (ref 2.5–4.5)
PLATELET # BLD AUTO: 185 K/UL — SIGNIFICANT CHANGE UP (ref 150–400)
POTASSIUM SERPL-MCNC: 4.8 MMOL/L — SIGNIFICANT CHANGE UP (ref 3.5–5.3)
RBC # BLD: 3.45 M/UL — LOW (ref 4.2–5.8)
RBC # FLD: 18.5 % — HIGH (ref 10.3–14.5)
SODIUM SERPL-SCNC: 131 MMOL/L — LOW (ref 135–145)
WBC # BLD: 12.61 K/UL — HIGH (ref 3.8–10.5)
WBC # FLD AUTO: 12.61 K/UL — HIGH (ref 3.8–10.5)

## 2025-03-15 PROCEDURE — 99233 SBSQ HOSP IP/OBS HIGH 50: CPT

## 2025-03-15 RX ORDER — POTASSIUM PHOSPHATE, MONOBASIC POTASSIUM PHOSPHATE, DIBASIC INJECTION, 236; 224 MG/ML; MG/ML
15 SOLUTION, CONCENTRATE INTRAVENOUS ONCE
Refills: 0 | Status: DISCONTINUED | OUTPATIENT
Start: 2025-03-15 | End: 2025-03-16

## 2025-03-15 RX ADMIN — INSULIN LISPRO 6 UNIT(S): 100 INJECTION, SOLUTION INTRAVENOUS; SUBCUTANEOUS at 06:11

## 2025-03-15 RX ADMIN — Medication 2.5 MILLIGRAM(S): at 07:33

## 2025-03-15 RX ADMIN — Medication 75 MILLIGRAM(S): at 14:24

## 2025-03-15 RX ADMIN — AMLODIPINE BESYLATE 10 MILLIGRAM(S): 10 TABLET ORAL at 06:11

## 2025-03-15 RX ADMIN — Medication 15 MILLILITER(S): at 06:12

## 2025-03-15 RX ADMIN — INSULIN LISPRO 2: 100 INJECTION, SOLUTION INTRAVENOUS; SUBCUTANEOUS at 01:18

## 2025-03-15 RX ADMIN — Medication 2.5 MILLIGRAM(S): at 03:22

## 2025-03-15 RX ADMIN — WHITE PETROLATUM 1 APPLICATION(S): 1 OINTMENT TOPICAL at 06:13

## 2025-03-15 RX ADMIN — ATORVASTATIN CALCIUM 20 MILLIGRAM(S): 80 TABLET, FILM COATED ORAL at 22:33

## 2025-03-15 RX ADMIN — Medication 300 MILLIGRAM(S): at 12:25

## 2025-03-15 RX ADMIN — HEPARIN SODIUM 5000 UNIT(S): 1000 INJECTION INTRAVENOUS; SUBCUTANEOUS at 18:06

## 2025-03-15 RX ADMIN — Medication 75 MILLIGRAM(S): at 22:33

## 2025-03-15 RX ADMIN — Medication 40 MILLIGRAM(S): at 06:13

## 2025-03-15 RX ADMIN — INSULIN LISPRO 1: 100 INJECTION, SOLUTION INTRAVENOUS; SUBCUTANEOUS at 06:10

## 2025-03-15 RX ADMIN — LABETALOL HYDROCHLORIDE 100 MILLIGRAM(S): 200 TABLET, FILM COATED ORAL at 06:12

## 2025-03-15 RX ADMIN — Medication 1 TABLET(S): at 12:24

## 2025-03-15 RX ADMIN — Medication 2.5 MILLIGRAM(S): at 20:38

## 2025-03-15 RX ADMIN — INSULIN LISPRO 6 UNIT(S): 100 INJECTION, SOLUTION INTRAVENOUS; SUBCUTANEOUS at 18:05

## 2025-03-15 RX ADMIN — Medication 75 MILLIGRAM(S): at 06:12

## 2025-03-15 RX ADMIN — HEPARIN SODIUM 5000 UNIT(S): 1000 INJECTION INTRAVENOUS; SUBCUTANEOUS at 06:12

## 2025-03-15 RX ADMIN — CASPOFUNGIN ACETATE 260 MILLIGRAM(S): 5 INJECTION, POWDER, LYOPHILIZED, FOR SOLUTION INTRAVENOUS at 12:21

## 2025-03-15 RX ADMIN — INSULIN LISPRO 6 UNIT(S): 100 INJECTION, SOLUTION INTRAVENOUS; SUBCUTANEOUS at 01:18

## 2025-03-15 RX ADMIN — WHITE PETROLATUM 1 APPLICATION(S): 1 OINTMENT TOPICAL at 18:05

## 2025-03-15 RX ADMIN — Medication 2.5 MILLIGRAM(S): at 15:50

## 2025-03-15 RX ADMIN — LABETALOL HYDROCHLORIDE 100 MILLIGRAM(S): 200 TABLET, FILM COATED ORAL at 18:04

## 2025-03-15 RX ADMIN — Medication 25 GRAM(S): at 06:13

## 2025-03-15 RX ADMIN — INSULIN GLARGINE-YFGN 12 UNIT(S): 100 INJECTION, SOLUTION SUBCUTANEOUS at 01:20

## 2025-03-15 RX ADMIN — Medication 25 GRAM(S): at 18:03

## 2025-03-15 RX ADMIN — INSULIN LISPRO 2: 100 INJECTION, SOLUTION INTRAVENOUS; SUBCUTANEOUS at 12:35

## 2025-03-15 RX ADMIN — Medication 15 MILLILITER(S): at 18:05

## 2025-03-15 RX ADMIN — INSULIN LISPRO 6 UNIT(S): 100 INJECTION, SOLUTION INTRAVENOUS; SUBCUTANEOUS at 12:34

## 2025-03-15 RX ADMIN — Medication 1 APPLICATION(S): at 12:25

## 2025-03-15 NOTE — PROGRESS NOTE ADULT - SUBJECTIVE AND OBJECTIVE BOX
[facility]  AUGUST Carbajal Y. Patel, S. Shah, G. Casimir  [phone]  ([phone] - weekdays after 5pm and weekends)    Name: JIMMY BLAND  Age/Gender: 74y Male  MRN: [id number]    Interval History:  Notes reviewed.   Afebrile.     Allergies: No Known Allergies      Objective:  Vitals:   T(F): 96.8 (03-15-25 @ 04:00), Max: 97.1 (03-14-25 @ 12:45)  HR: 67 (03-15-25 @ 04:00) (64 - 79)  BP: 142/50 (03-15-25 @ 04:00) (126/61 - 165/67)  RR: 15 (03-15-25 @ 04:00) (15 - 18)  SpO2: 100% (03-15-25 @ 04:00) (98% - 100%)  Physical Examination:  General: frail appearing  HEENT: anicteric, tracheostomy, on vent  Cardio: S1, S2, normal rate  Resp: clear to auscultation anteriorly   Abd: Soft. Nondistended. PEG  Ext: no LE edema  Skin: warm, dry    Laboratory Studies:  CBC:                       8.6    12.61 )-----------( 185      ( 15 Mar 2025 07:14 )             27.9     WBC Trend:  12.61 03-15-25 @ 07:14  16.47 03-14-25 @ 09:45  18.65 03-13-25 @ 06:30  18.73 03-12-25 @ 04:53  14.20 03-11-25 @ 05:50  23.01 03-10-25 @ 17:00  21.01 03-09-25 @ 05:20    CMP: 03-14    131[L]  |  90[L]  |  61[H]  ----------------------------<  159[H]  3.8   |  26  |  3.18[H]    Ca    9.6      14 Mar 2025 09:45  Phos  3.0     03-14  Mg     2.60     03-14            Urinalysis Basic - ( 14 Mar 2025 09:45 )    Color: x / Appearance: x / SG: x / pH: x  Gluc: 159 mg/dL / Ketone: x  / Bili: x / Urobili: x   Blood: x / Protein: x / Nitrite: x   Leuk Esterase: x / RBC: x / WBC x   Sq Epi: x / Non Sq Epi: x / Bacteria: x      Microbiology: reviewed     Culture - Blood (collected 03-13-25 @ 08:51)  Source: Blood Blood-Peripheral  Preliminary Report (03-14-25 @ 13:02):    No growth at 24 hours    Culture - Blood (collected 03-13-25 @ 08:30)  Source: Blood Blood-Peripheral  Preliminary Report (03-14-25 @ 13:02):    No growth at 24 hours    Culture - Blood (collected 03-12-25 @ 04:52)  Source: Blood Blood-Venous  Gram Stain (03-13-25 @ 06:28):    Growth in anaerobic bottle: Gram Positive Cocci in Clusters and Gram    Positive Cocci in Pairs and Chains  Preliminary Report (03-14-25 @ 18:09):    Growth in anaerobic bottle: Enterococcus faecium Susceptibility to follow.    Growth in anaerobic bottle: Staphylococcus epidermidis "Susceptibilities    not performed"    Growth in anaerobic bottle: Candida auris Referred to Mycology for    susceptibility    Direct identification is available within approximately 3-5    hours either by Blood Panel Multiplexed PCR or Direct    MALDI-TOF. Details: [email or web address]  Organism: Blood Culture PCR (03-13-25 @ 09:47)  Organism: Blood Culture PCR (03-13-25 @ 09:47)      Method Type: PCR      -  Enterococcus faecium: Detec      -  Staphylococcus epidermidis, Methicillin resistant: Detec      -  Candida auris: Detec    Culture - Blood (collected 03-12-25 @ 04:40)  Source: Blood Blood-Venous  Preliminary Report (03-14-25 @ 10:01):    No growth at 48 Hours    Culture - Blood (collected 03-09-25 @ 05:20)  Source: Blood Blood-Peripheral  Final Report (03-14-25 @ 09:01):    No growth at 5 days    Culture - Blood (collected 03-09-25 @ 05:05)  Source: Blood Blood-Venous  Final Report (03-14-25 @ 09:01):    No growth at 5 days    Culture - Blood (collected 03-03-25 @ 16:27)  Source: Blood Blood-Peripheral  Final Report (03-08-25 @ 22:01):    No growth at 5 days    Culture - Blood (collected 03-03-25 @ 16:23)  Source: Blood Blood-Venous  Final Report (03-08-25 @ 22:01):    No growth at 5 days    Culture - Sputum (collected 03-03-25 @ 14:10)  Source: Trach Asp Tracheal Aspirate  Gram Stain (03-04-25 @ 04:28):    Few polymorphonuclear leukocytes per low power field    No Squamous epithelial cells per low power field    Moderate Gram Negative Rods seen per oil power field  Final Report (03-05-25 @ 16:12):    Numerous Pseudomonas aeruginosa (Carbapenem Resistant)    Commensal dominick consistent with body site  Organism: Pseudomonas aeruginosa (Carbapenem Resistant) (03-05-25 @ 16:12)  Organism: Pseudomonas aeruginosa (Carbapenem Resistant) (03-05-25 @ 16:12)      Method Type: CarbaR      -  Resistance Gene to Carbapenem: Nondet  Organism: Pseudomonas aeruginosa (Carbapenem Resistant) (03-05-25 @ 16:12)      Method Type: VIDA      -  Aztreonam: S <=4      -  Cefepime: S <=2      -  Ceftazidime: S 4      -  Ceftazidime/Avibactam: S <=4      -  Ceftolozane/tazobactam: S <=2      -  Ciprofloxacin: S <=0.25      -  Imipenem: R 8      -  Levofloxacin: S <=0.5      -  Meropenem: R 8      -  Piperacillin/Tazobactam: S <=8        Radiology: reviewed     Medications:  albuterol    0.083% 2.5 milliGRAM(s) Nebulizer every 6 hours  amLODIPine   Tablet 10 milliGRAM(s) Oral daily  AQUAPHOR (petrolatum Ointment) 1 Application(s) Topical two times a day  atorvastatin 20 milliGRAM(s) Oral at bedtime  caspofungin IVPB 50 milliGRAM(s) IV Intermittent every 24 hours  caspofungin IVPB      chlorhexidine 0.12% Liquid 15 milliLiter(s) Oral Mucosa every 12 hours  chlorhexidine 2% Cloths 1 Application(s) Topical daily  dextrose 5%. 1000 milliLiter(s) IV Continuous <Continuous>  dextrose 5%. 1000 milliLiter(s) IV Continuous <Continuous>  dextrose 50% Injectable 25 Gram(s) IV Push once  dextrose 50% Injectable 12.5 Gram(s) IV Push once  dextrose 50% Injectable 25 Gram(s) IV Push once  dextrose Oral Gel 15 Gram(s) Oral once PRN  epoetin reilly-epbx (RETACRIT) Injectable 26263 Unit(s) IV Push <User Schedule>  ferrous    sulfate Liquid 300 milliGRAM(s) Enteral Tube daily  glucagon  Injectable 1 milliGRAM(s) IntraMuscular once  heparin   Injectable 5000 Unit(s) SubCutaneous every 12 hours  hydrALAZINE 75 milliGRAM(s) Oral three times a day  insulin glargine Injectable (LANTUS) 12 Unit(s) SubCutaneous at bedtime  insulin lispro (ADMELOG) corrective regimen sliding scale   SubCutaneous every 6 hours  insulin lispro Injectable (ADMELOG) 6 Unit(s) SubCutaneous every 6 hours  labetalol 100 milliGRAM(s) Oral two times a day  Nephro-noam 1 Tablet(s) Oral daily  pantoprazole   Suspension 40 milliGRAM(s) Oral before breakfast  piperacillin/tazobactam IVPB.. 3.375 Gram(s) IV Intermittent every 12 hours  sodium chloride 0.9% Bolus. 100 milliLiter(s) IV Bolus every 5 minutes PRN  sodium chloride 0.9% for Nebulization 3 milliLiter(s) Nebulizer every 6 hours    Antimicrobials:  caspofungin IVPB 50 milliGRAM(s) IV Intermittent every 24 hours  caspofungin IVPB      piperacillin/tazobactam IVPB.. 3.375 Gram(s) IV Intermittent every 12 hours

## 2025-03-15 NOTE — PROGRESS NOTE ADULT - ASSESSMENT
73 y/o M PMhx trach/vent (last changed 10/23/24), CVA x2 with residual R hemiplegia, COPD, ESRD on HD MWF who presented to ED for leukocytosis when initially planned for fistulogram and noted to have a WBC of 18.   he was treated for E faecium UTI, and  pseudomonas VAP s/p cefepime, completed 2/25  he developed fever and was started on zosyn  bronch cx grew pseudomonas    E faecium bacteremia, candidemia  blood cx from 3/12- PCR positive for candida auris and E faecium, MRSE  - no Van genes detected for vancomycin resistance by PCR  CT C/A/P- Multifocal pneumonia. Fluid and debris in the trachea and bronchi, left greater than right. Aspiration is a consideration. Infiltration of the soft tissues posterior to the bilateral ischia and right greater trochanter, without evidence of underlying drainable fluid collection.  TTE without evidence of vegetations  s/p optho eval- no evidence of endophthalmitis    pseudomonas PNA- treated  trach aspirate  pseudomonas  s/p cefepime x 7 days, completed 2/25  s/p zosyn x 7 days, completed 3/10    DVT  RUE US- age-indeterminate, non-occlusive deep vein thrombosis noted in the right brachial vein  on eliquis    ESRD  HD per nephrology    Recommendations  c/w zosyn  - complete another 7 day course w/ last day 3/18  c/w caspofungin  check vanc level prior to HD on monday, will redose after HD on 3/17  f/u repeat blood cultures  aspiration precautions  contact isolation for candida auris    Steven Torres M.D.  [facility]  [phone]  After 5pm on weekdays and all day on weekends - please call [phone]  Available on [employer] teams    Thank you for consulting us and involving us in the management of this patients case. In addition to reviewing history, imaging, documents, labs, microbiology, took into account antibiotic stewardship, local antibiogram and infection control strategies and potential transmission issues.

## 2025-03-15 NOTE — PROGRESS NOTE ADULT - ASSESSMENT
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 Patent recently admitted in 6/2024 for left pontine and cerebellar CVA requiring tracheostomy. While at [facility] patient decannulated and passed SS with advanced diet to easy to chew with thin liquids, but complicated COVID requiring transfer to [facility] in 7/2024 and then ultimately discharged home. Per wife patient was doing well at home until 10/2024 when he was found with RLL with concern for aspiration requiring intubation, then tracheostomy, and ultimately discharged to NH with vent dependence in 11/2024. Patient now presented for RUE fistulogram and angioplasty with vascular, however course complicated by leukocytosis with concern for recurrent trachitis vs PNA and UTI, AOCD and hyperglycemia. Admitted to [facility] for further management. Found with  PSA trachitis/ PNA and enterococcus faecium UTI. Course complicated by RUE brachial DVT and hypoxia with HD with concern for volume down vs PE? Volume removal held and hypoxia improved. AC given empirically and CTA CHEST with no PE. Case discussed with vascular and since brachial DVT appears old no need for chronic AC . Course further complicated by PSA LLL PNA and continued on zosyn.     NEUROLOGY  # Poor mentation second to metabolic encephalopathy vs psychosomatic/ depression   - Hx of L cerebellar and pontene embolic CVA x 2 with residual R hemiplegia   - AOx0, bedbound, and nonverbal at baseline per report, however per exam patient able to open eyes, able to follow commands on left (LUE>LLE) with SEVERE weakness, however noted with R hemiplegia per baseline  - Nods yes and no occasionally however keeps eyes closed likely psychosomatic vs metabolic encephalopathy with infections?   - Last CTH in 10/2024 with no acute findings   - Hold [facility] CTH for now   - Monitor mentation     CARDIOVASCULAR  # Hx of HTN and HLD  - Continue on hydral 75 (increased 3/13) and added labetalol 100 mg BID (3/13) - titrate as tolerated   - Continue on norvasc 10   - Monitor BP     # Incidental Pericardial effusion   - Incidental small pericardial effusion seen adjacent to right ventricle, however IVC appears flat and LE without edema with low concern for RV failure.   - Prior TTE reviewed from 4/2024 with normal RVLVSF with no pericardial effusion noted at the time.   - TTE 2/23 with EF 65, normal LVRVSF, mild LAD, normal RA, mild pulmonary hypertension, and small pericardial effusion noted adjacent to the anterior right ventricle with no echocardiographic evidence of tamponade  - Monitor hemodynamics     RESPIRATORY  # Respiratory failure second to PNA vs volume down vs PE?   - Hx of COPD with chronic respiratory failure with tracheostomy and vent dependence  - Admitted for angioplasty of RUE AVF, however course complicated by leukocytosis with concern for recurrent trachitis/ PNA.   - Course complicated by hypoxia during HD likely volume down vs migration of RUE brachial DVT with PE?   - Held volume removal, bolus given, and hypoxia improved.   - CTA CHEST without PE  - Course complicated by LLL mucous plug and IPV started with improvement   - Continue on albuterol, NS 0.9 and IPV  - Continue on vent 15/400/6/40    HEENT   # Hemoptysis   - Wife reported hemoptysis while at nursing home 2 weeks prior to admission   - No hemoptysis noted on admission   - Hemoptysis returned in house   - CTA CHEST 2/24 without PE  - Bronch 2/26 with no evidence of bleeding   - Bleeding resolved     GI  # Dysphagia with PEG   - Passed SS with advanced diet to easy to chew with thin liquids in 7/2024, however since recurrent aspiration in 10/2024 now with PEG/ vent dependence   - Continue on PEG TF and changed to bolus feeds   - Monitor tolerance     RENAL  # ESRD on HD   # Dehydration   - Resumed on HD with no flow issue from AVF   - Course complicated by hypoxia with concern for volume down given small IVC and elevated lactate on 2/21  - Volume removal held, lactate improved, and hypoxia improved.   - Continue on HD MWF in house per renal   - Return to HD MTRF outpatient   - Monitor renal function, electrolytes and UOP     # AVF trouble   # AVF oozing   - Presented for RUE fistulogram and angioplasty with vascular on 2/18   - Oozing from AVF post HD, surgicell placed, and AC held with improvement.  - Discussed with vascular and no need for AC given DVT as below is chronic   - RPT DOPPLER with again noted DVT in right stented brachial vein, however als noted with significantly elevated flow velocities with turbulence at the anastomotic site. Wall thickening noted at the proximal segment of the outflow vein, which is otherwise patent. Distal segment of the outflow vein appears patent without thrombosis evidence.   - Discussed with vascular and given patent AVF with no HD trouble no need for fistulogram at this time.     # Hyponatremia likely volume down   - Concern for volume issues, however overall appears volume down with serum osmo 307  - Sodium 128 and improved with HD/ bolus during HD.   - Trend sodium with HD for now     # Elevated lactate   - Lactate elevated likely second to volume down?   - Post bolus lactate trending down from 3.3 to 2.7 to 1.3    INFECTIOUS DISEASE  # Recurrent LLL PNA   - CXR with LLL mucous plug   - POCUS with LLL consolidation vs atelectasis   - Recent BAL with PSA as below   - FULL RVP negative  - SCx with PSA  - Fevers returned and zosyn (3/3 - 3/11), last dose 3/11  - WBC rising and low grade fever overnight; Discussed with ID, recommended CT chest, abdomen and pelvis  - Restarted Zosyn empirically (3/12 -)  - 3/13 - CT C/A/P with MFPNA, small b/l pleff, and infiltration of skin and sub tissue adjacent to R femur  - Continued on IV Zosyn, recent BCx with enterococcus, staph epi and candida auris  - added Vancomycin dose by level and Caspo 3/13 - )  - rpt TTE without vegetations and optho c/s for endophthalmitis negative    - repeat BCx 3/13 NGTD and repeat cultures for 3/14 pending     # Hemoptysis   - Hemoptysis as above   - BAL with PSA as prior   - No fever and monitor off ABX     # Trachitis vs PNA/ UTI   - Hx of steno and PSA in sputum   - Flu/ COVID negative   - MRSA negative   - UCx with enterococcus   - SCx with  PSA   - Found with leukocytosis and started on cefepime and christiano (2/19) and vanco on HD days (2/21).   - No further steno seen in sputum and continue on cefepime (2/19 - 2/25) and vanco on HD days (2/21, 2/22 - 2/25)   - WBC increased likely reactive to hypoxic event and now improving  - ID following     # PPX   - MRSA PCR negative  - Candida auris PCR POSITIVE  - Continue on isolation precautions per IC    HEME  # AOCD   - Presented for angioplasty and found with anemia to 6.7 s/p 1U PRBC 2/19 and 1/2 PRBC 2/23   - Anemia panel with concern for AOCD   - Continue on EPO QIW and Fe   - Monitor HH   - 3/14 Hgb 6.8 and transfusing 1u PRBC today, no active signs of bleeding    # Heparin resistance?   - PTT persistently low despite increasing heparin GTT   - Resume on heparin GTT and anti Xa level supra therapeutic on but PTT remained low  - Patient ultimately with concern for heparin resistance    VASCULAR  # RUE DVT   - RUE edema noted with age-indeterminate, non-occlusive deep vein thrombosis noted in the right brachial vein.   - Case discussed with vascular who recommends full AC   - CTH from prior with encephalomalcia, however no hx of intracranial bleed   - Prior UGIB while on AC, however discharged on eliquis in 7/2024 and completed 6 month course for RLE DVT   - Continue on heparin GTT, however held for hemoptysis and resistance   - Continued on argatroban with good tolerance and then changed to eliquis   - Case discussed vascular and DVT likely chronic and no need for eliquis     ENDOCRINE  # DM2 A1C 14.0 (1/2024) > 6.4 (2/2025)  - Presented with hyperglycemia and continued on lantus and ISS   - Increased lantus, however FS continues to trend in 200s and changed to NPH with improvement.  - TF interrupted for bronchoscopy and adjustment to bolus feeds and FS dropped.   - NPH stopped and FS improved, however trended back up with continuation of tube feeds.   - NPH resumed, however FS continues to wax and wane and case discussed with endocrine   - morning hypoglycemic episode (3/11), changed lantus to 8 per endocrine. Continue with lispro 5 with ISS for now.   - Sugars rising after lowering Lantus dose, will follow up with endocrine   - Monitor FS and adjust as needed  - Endocrine following     ETHICS/ GOC    - Palliative discussion on 3/5  - Remains FULL CODE     DISPO - Back to NH when able

## 2025-03-15 NOTE — PROGRESS NOTE ADULT - ASSESSMENT
74-year-old male hx trach/vent - , CVA, COPD, stage renal disease on dialysis 4 times a week (MTRF) history of pressure ulcer.  Patient presents the ED today for elevated white count. nephrology consulted for dialysis needs    ESRD:  from [facility]  On HD MTTsF via an A-V fistula. s/p fistulogram by vascular 2/18  Continue MWF in hospital  Prolonged bleeding post HD -- vascular consulted. no plan for intervention.  s/p hd 3/12 uf 2L.  S/p HD on 03/14tolerated UF of 2.5L.  Received 1unit PRBC on 0/14  PUF 2 Hour today  Next HD on Monday  PUF as needed in view of UE edema.  consent from wife in dialysis unit.  monitor bmp    Hypertension  BP fluctuating; acceptable.  continue norvasc 10 daily and hydralazine increased to 50 tid 3/2  C/W hydralazine 75mg TID.  max uf as tolerated  monitor BP    Anemia:  s/p 1 unit PRBC 2/23  epo with hd  iron sat >20  Given 1 unit of PRBC again today.  monitor Hgb  Transfuse for Hg < 7.    leukocytosis  sepsis work up per team  On Zosyn and now vancomycin.  Monitor trough level.    hyponatremia  in setting of esrd and hyperglycemia  optimize dm control  hd per schedule with UF  monitor Na    Hypokalemia  Dialyze w/ 4K bath   Monitor K.    Hypermagnesemia  HD per schedule.  Monitor Mg.    CKD - MBD:  PTH 25 - low for CKD stage.  Received PhosNaK x1 on 3/8.  PO4 improved.  Monitor PO4 and Ca daily.

## 2025-03-15 NOTE — PROGRESS NOTE ADULT - SUBJECTIVE AND OBJECTIVE BOX
CHIEF COMPLAINT: Patient is a 74y old  Male who presents with a chief complaint of 2/18/25 was in cath lab earlier today with vascular surgery for fistulogram and noted to have a WBC of 18 and sent to ED and admitted (04 Mar 2025 12:22)      INTERVAL EVENTS: No acute events overnight.    REVIEW OF SYSTEMS: Seen and evaluated by bedside during AM rounds.      Mode: AC/ CMV (Assist Control/ Continuous Mandatory Ventilation), RR (machine): 15, TV (machine): 400, FiO2: 40, PEEP: 6, ITime: 0.72, MAP: 6, PIP: 24      OBJECTIVE:  ICU Vital Signs Last 24 Hrs  T(C): 36.1 (15 Mar 2025 00:00), Max: 36.2 (14 Mar 2025 12:45)  T(F): 96.9 (15 Mar 2025 00:00), Max: 97.1 (14 Mar 2025 12:45)  HR: 72 (15 Mar 2025 03:44) (64 - 79)  BP: 149/67 (15 Mar 2025 00:00) (126/61 - 165/67)  BP(mean): 88 (15 Mar 2025 00:00) (77 - 93)  ABP: --  ABP(mean): --  RR: 18 (15 Mar 2025 00:00) (16 - 18)  SpO2: 99% (15 Mar 2025 03:44) (98% - 100%)    O2 Parameters below as of 15 Mar 2025 03:22  Patient On (Oxygen Delivery Method): ventilator          Mode: AC/ CMV (Assist Control/ Continuous Mandatory Ventilation), RR (machine): 15, TV (machine): 400, FiO2: 40, PEEP: 6, ITime: 0.72, MAP: 6, PIP: 24    03-14 @ 07:01  -  03-15 @ 06:43  --------------------------------------------------------  IN: 400 mL / OUT: 2900 mL / NET: -2500 mL      CAPILLARY BLOOD GLUCOSE      POCT Blood Glucose.: 178 mg/dL (15 Mar 2025 05:53)      HOSPITAL MEDICATIONS:  MEDICATIONS  (STANDING):  albuterol    0.083% 2.5 milliGRAM(s) Nebulizer every 6 hours  amLODIPine   Tablet 10 milliGRAM(s) Oral daily  AQUAPHOR (petrolatum Ointment) 1 Application(s) Topical two times a day  atorvastatin 20 milliGRAM(s) Oral at bedtime  caspofungin IVPB 50 milliGRAM(s) IV Intermittent every 24 hours  caspofungin IVPB      chlorhexidine 0.12% Liquid 15 milliLiter(s) Oral Mucosa every 12 hours  chlorhexidine 2% Cloths 1 Application(s) Topical daily  dextrose 5%. 1000 milliLiter(s) (100 mL/Hr) IV Continuous <Continuous>  dextrose 5%. 1000 milliLiter(s) (50 mL/Hr) IV Continuous <Continuous>  dextrose 50% Injectable 25 Gram(s) IV Push once  dextrose 50% Injectable 12.5 Gram(s) IV Push once  dextrose 50% Injectable 25 Gram(s) IV Push once  epoetin reilly-epbx (RETACRIT) Injectable 87765 Unit(s) IV Push <User Schedule>  ferrous    sulfate Liquid 300 milliGRAM(s) Enteral Tube daily  glucagon  Injectable 1 milliGRAM(s) IntraMuscular once  heparin   Injectable 5000 Unit(s) SubCutaneous every 12 hours  hydrALAZINE 75 milliGRAM(s) Oral three times a day  insulin glargine Injectable (LANTUS) 12 Unit(s) SubCutaneous at bedtime  insulin lispro (ADMELOG) corrective regimen sliding scale   SubCutaneous every 6 hours  insulin lispro Injectable (ADMELOG) 6 Unit(s) SubCutaneous every 6 hours  labetalol 100 milliGRAM(s) Oral two times a day  Nephro-noam 1 Tablet(s) Oral daily  pantoprazole   Suspension 40 milliGRAM(s) Oral before breakfast  piperacillin/tazobactam IVPB.. 3.375 Gram(s) IV Intermittent every 12 hours  sodium chloride 0.9% for Nebulization 3 milliLiter(s) Nebulizer every 6 hours    MEDICATIONS  (PRN):  dextrose Oral Gel 15 Gram(s) Oral once PRN Blood Glucose LESS THAN 70 milliGRAM(s)/deciliter  sodium chloride 0.9% Bolus. 100 milliLiter(s) IV Bolus every 5 minutes PRN SBP LESS THAN or EQUAL to 80 mmHg      PHYSICAL EXAMINATION  General: Alert and cooperative. Resting comfortably. No apparent distress noted. Dry mucous membrane noted. White conjunctiva, no icterus.   HEENT: Normocephalic/atraumatic. EOMI. No discharge, conjunctivitis, or scleral icterus. No ptosis. No discharge or sinus tenderness noted. Mucous membranes are pink without bleeding. Tonsils are not enlarged. Good dental hygiene noted. No facial asymmetry. Neck is supple with a full range of motion. No masses or tenderness. Trachea is midline. Lymph nodes are not enlarged.   Cardiovascular: Normal rate and regular rhythm. No murmur/gallop.   Respiratory: Breath sounds clear and equal bilaterally without rales, wheezing, or rhonchi. No accessory muscles used.   Abdomen: Soft, nontender, nondistended. Bowel sounds are present. No bruit. No hepatosplenomegaly or masses. No rebound or guarding.   Skin: Warm to touch. No rashes, wounds, or skin lesions noted.   Extremities: +2 dorsalis pedis pulse is noted for the lower extremities. Full range of motion on all four extremities. 5/5 motor strength noted in all extremities. No clubbing, cyanosis, edema, or varicose veins.   MSK: No swelling or deformity. Posture, body symmetry, and movements are appropriate.   Neuro: AxO x 3, CN II - XII grossly intact.    LABS:                        6.8    16.47 )-----------( 289      ( 14 Mar 2025 09:45 )             22.2     03-14    131[L]  |  90[L]  |  61[H]  ----------------------------<  159[H]  3.8   |  26  |  3.18[H]    Ca    9.6      14 Mar 2025 09:45  Phos  3.0     03-14  Mg     2.60     03-14        Urinalysis Basic - ( 14 Mar 2025 09:45 )    Color: x / Appearance: x / SG: x / pH: x  Gluc: 159 mg/dL / Ketone: x  / Bili: x / Urobili: x   Blood: x / Protein: x / Nitrite: x   Leuk Esterase: x / RBC: x / WBC x   Sq Epi: x / Non Sq Epi: x / Bacteria: x            PAST MEDICAL & SURGICAL HISTORY:  ESRD on hemodialysis      HTN (hypertension)      Insulin dependent type 2 diabetes mellitus      History of cerebrovascular accident (CVA) with residual deficit      History of DVT of lower extremity      HLD (hyperlipidemia)      CVA (cerebrovascular accident)      Aphasia      Tracheostomy in place      PEG (percutaneous endoscopic gastrostomy) status      GERD (gastroesophageal reflux disease)      Constipation      Pressure ulcer      Arteriovenous fistula      S/P percutaneous endoscopic gastrostomy (PEG) tube placement      History of tracheostomy          FAMILY HISTORY:  FHx: diabetes mellitus (Father, Aunt)        Social History:      RADIOLOGY:  [ ] Reviewed and interpreted by me    PULMONARY FUNCTION TESTS:    EKG: CHIEF COMPLAINT: Patient is a 74y old  Male who presents with a chief complaint of 2/18/25 was in cath lab earlier today with vascular surgery for fistulogram and noted to have a WBC of 18 and sent to ED and admitted    INTERVAL EVENTS: No acute events overnight.    REVIEW OF SYSTEMS: Seen and evaluated by bedside during AM rounds. Unable to assess ROS due to poor mentation.     Mode: AC/ CMV (Assist Control/ Continuous Mandatory Ventilation), RR (machine): 15, TV (machine): 400, FiO2: 40, PEEP: 6, ITime: 0.72, MAP: 6, PIP: 24      OBJECTIVE:  ICU Vital Signs Last 24 Hrs  T(C): 36.1 (15 Mar 2025 00:00), Max: 36.2 (14 Mar 2025 12:45)  T(F): 96.9 (15 Mar 2025 00:00), Max: 97.1 (14 Mar 2025 12:45)  HR: 72 (15 Mar 2025 03:44) (64 - 79)  BP: 149/67 (15 Mar 2025 00:00) (126/61 - 165/67)  BP(mean): 88 (15 Mar 2025 00:00) (77 - 93)  ABP: --  ABP(mean): --  RR: 18 (15 Mar 2025 00:00) (16 - 18)  SpO2: 99% (15 Mar 2025 03:44) (98% - 100%)    O2 Parameters below as of 15 Mar 2025 03:22  Patient On (Oxygen Delivery Method): ventilator          Mode: AC/ CMV (Assist Control/ Continuous Mandatory Ventilation), RR (machine): 15, TV (machine): 400, FiO2: 40, PEEP: 6, ITime: 0.72, MAP: 6, PIP: 24    03-14 @ 07:01  -  03-15 @ 06:43  --------------------------------------------------------  IN: 400 mL / OUT: 2900 mL / NET: -2500 mL      CAPILLARY BLOOD GLUCOSE      POCT Blood Glucose.: 178 mg/dL (15 Mar 2025 05:53)      HOSPITAL MEDICATIONS:  MEDICATIONS  (STANDING):  albuterol    0.083% 2.5 milliGRAM(s) Nebulizer every 6 hours  amLODIPine   Tablet 10 milliGRAM(s) Oral daily  AQUAPHOR (petrolatum Ointment) 1 Application(s) Topical two times a day  atorvastatin 20 milliGRAM(s) Oral at bedtime  caspofungin IVPB 50 milliGRAM(s) IV Intermittent every 24 hours  caspofungin IVPB      chlorhexidine 0.12% Liquid 15 milliLiter(s) Oral Mucosa every 12 hours  chlorhexidine 2% Cloths 1 Application(s) Topical daily  dextrose 5%. 1000 milliLiter(s) (100 mL/Hr) IV Continuous <Continuous>  dextrose 5%. 1000 milliLiter(s) (50 mL/Hr) IV Continuous <Continuous>  dextrose 50% Injectable 25 Gram(s) IV Push once  dextrose 50% Injectable 12.5 Gram(s) IV Push once  dextrose 50% Injectable 25 Gram(s) IV Push once  epoetin reilly-epbx (RETACRIT) Injectable 76994 Unit(s) IV Push <User Schedule>  ferrous    sulfate Liquid 300 milliGRAM(s) Enteral Tube daily  glucagon  Injectable 1 milliGRAM(s) IntraMuscular once  heparin   Injectable 5000 Unit(s) SubCutaneous every 12 hours  hydrALAZINE 75 milliGRAM(s) Oral three times a day  insulin glargine Injectable (LANTUS) 12 Unit(s) SubCutaneous at bedtime  insulin lispro (ADMELOG) corrective regimen sliding scale   SubCutaneous every 6 hours  insulin lispro Injectable (ADMELOG) 6 Unit(s) SubCutaneous every 6 hours  labetalol 100 milliGRAM(s) Oral two times a day  Nephro-noam 1 Tablet(s) Oral daily  pantoprazole   Suspension 40 milliGRAM(s) Oral before breakfast  piperacillin/tazobactam IVPB.. 3.375 Gram(s) IV Intermittent every 12 hours  sodium chloride 0.9% for Nebulization 3 milliLiter(s) Nebulizer every 6 hours    MEDICATIONS  (PRN):  dextrose Oral Gel 15 Gram(s) Oral once PRN Blood Glucose LESS THAN 70 milliGRAM(s)/deciliter  sodium chloride 0.9% Bolus. 100 milliLiter(s) IV Bolus every 5 minutes PRN SBP LESS THAN or EQUAL to 80 mmHg      PHYSICAL EXAMINATION  General: Sleeping. Resting comfortably. No apparent distress noted.   HEENT: Normocephalic/atraumatic.   Cardiovascular: Normal rate and regular rhythm.    Respiratory: + Trach to vent (AC/VC 15/400/+6/40%). Breath sounds coarse bilaterally without wheezing. No accessory muscles used.   Abdomen: +PEG c/d/i. Soft, nontender, nondistended.   Skin: Warm to touch.   Extremities: No clubbing, cyanosis, edema, or varicose veins.   Neuro: Sleepy. Eyes open w verbal stimulation. Grimace to painful stimuli.     LABS:                        6.8    16.47 )-----------( 289      ( 14 Mar 2025 09:45 )             22.2     03-14    131[L]  |  90[L]  |  61[H]  ----------------------------<  159[H]  3.8   |  26  |  3.18[H]    Ca    9.6      14 Mar 2025 09:45  Phos  3.0     03-14  Mg     2.60     03-14        Urinalysis Basic - ( 14 Mar 2025 09:45 )    Color: x / Appearance: x / SG: x / pH: x  Gluc: 159 mg/dL / Ketone: x  / Bili: x / Urobili: x   Blood: x / Protein: x / Nitrite: x   Leuk Esterase: x / RBC: x / WBC x   Sq Epi: x / Non Sq Epi: x / Bacteria: x            PAST MEDICAL & SURGICAL HISTORY:  ESRD on hemodialysis      HTN (hypertension)      Insulin dependent type 2 diabetes mellitus      History of cerebrovascular accident (CVA) with residual deficit      History of DVT of lower extremity      HLD (hyperlipidemia)      CVA (cerebrovascular accident)      Aphasia      Tracheostomy in place      PEG (percutaneous endoscopic gastrostomy) status      GERD (gastroesophageal reflux disease)      Constipation      Pressure ulcer      Arteriovenous fistula      S/P percutaneous endoscopic gastrostomy (PEG) tube placement      History of tracheostomy          FAMILY HISTORY:  FHx: diabetes mellitus (Father, Aunt)        Social History:      RADIOLOGY:  [ ] Reviewed and interpreted by me    PULMONARY FUNCTION TESTS:    EKG:

## 2025-03-15 NOTE — PROGRESS NOTE ADULT - NS ATTEND AMEND GEN_ALL_CORE FT
74M with h/o ESRD on HD via AVF, HTN, DMII, and recent prolonged hospitalization (4-6/2024) for baclofen toxicity and acute ischemic CVA of the left talya/cerebellum c/b acute hypoxemic and hypercapnic respiratory failure s/p ETT intubation/MV with failure to wean from ventilator s/p tracheostomy with hospital course further complicated by aspiration and B cereus bacteremia and RLE DVT followed by citrobacter PNA, GIB i/s/o AOCD, and fistula stenosis s/p fistulogram 6/4 ultimately transferred to acute rehab, decannulated, and discharged home until 10/2024 when pt returned to OSH with aspiration PNA c/b hypoxemic respiratory failure requiring ETT intubation/MV followed by tracheostomy placement and d/c to LTCF ([facility]) 11/2024 now presenting to [facility] on 2/18/25 for RUE fistulogram/angioplasty with vascular sx but with hospital course c/b concern fo recurrent PNA and uncontrolled hyperglycemia admitted to RCU for further medical management.    Per vascular no further intervention, no need to be on full a/c 2/2 to fistula bleeding. Not tolerating full a/c.     Completed course of abx but now with C. auris fungemia (3/12), MRSE bacteremia and enterococcus bacteremia. CT with dense consolidation.   Repeat Wound care eval. TTE without signs of endocarditis.       # Encephalopathy 2/2 CVA  # Acute on chronic hypoxemic and hypercapnic respiratory failure. S/P Trach now x2.   # Mucus plugging, atelectasias  # oropharyngeal dysphagia   # GIB  # Bleeding AV fistula  # pressure ulcer.   # ESRD on HD  # DVT  # C. auris fungemia  # Bacteremia.   - CVA with recent strokes, c/w statins, unable to tolerate full a/c  - c/w vent, previously decannulated now retrached. Unable to come off vent. C/W albuterol and NS nebs. PS as tolerated.   - C/W peg tube feeds  - Bleeding AV fistula, per vascular no acute interventions are planned, Recommending against full a/c. Not tolerating 2/2 to bleeding.   - Wound care for pressure ulcers  - Per vascular DVT likely chronic  - HD per renal  - Fevers, s/p cultures now with fungemia, bacteremia. Repeat TTE without signs of endocarditis. Repeat blood cultures 3/13 NGTD. c/w caspo and Zosyn. F/U further ID recs. Monitor levels.   - DVT ppx- SQH  - Dispo- Full code. Prognosis overall is poor. 74M with h/o ESRD on HD via AVF, HTN, DMII, and recent prolonged hospitalization (4-6/2024) for baclofen toxicity and acute ischemic CVA of the left talya/cerebellum c/b acute hypoxemic and hypercapnic respiratory failure s/p ETT intubation/MV with failure to wean from ventilator s/p tracheostomy with hospital course further complicated by aspiration and B cereus bacteremia and RLE DVT followed by citrobacter PNA, GIB i/s/o AOCD, and fistula stenosis s/p fistulogram 6/4 ultimately transferred to acute rehab, decannulated, and discharged home until 10/2024 when pt returned to OSH with aspiration PNA c/b hypoxemic respiratory failure requiring ETT intubation/MV followed by tracheostomy placement and d/c to LTCF ([facility]) 11/2024 now presenting to [facility] on 2/18/25 for RUE fistulogram/angioplasty with vascular sx but with hospital course c/b concern fo recurrent PNA and uncontrolled hyperglycemia admitted to RCU for further medical management.    Per vascular no further intervention, no need to be on full a/c 2/2 to fistula bleeding. Not tolerating full a/c.     Completed course of abx but now with C. auris fungemia (3/12), MRSE bacteremia and enterococcus bacteremia. CT with dense consolidation.   Repeat Wound care eval. TTE without signs of endocarditis. Ophtho eval - no e/o endophthalmitis       # Encephalopathy 2/2 CVA  # Acute on chronic hypoxemic and hypercapnic respiratory failure. S/P Trach now x2.   # Mucus plugging, atelectasias  # oropharyngeal dysphagia   # GIB  # Bleeding AV fistula  # pressure ulcer.   # ESRD on HD  # DVT  # C. auris fungemia  # Bacteremia.   - CVA with recent strokes, c/w statins, unable to tolerate full a/c  - c/w vent, previously decannulated now retrached. Unable to come off vent. C/W albuterol and NS nebs. PS as tolerated.   - C/W peg tube feeds  - Bleeding AV fistula, per vascular no acute interventions are planned, Recommending against full a/c. Not tolerating 2/2 to bleeding.   - Wound care for pressure ulcers  - Per vascular DVT likely chronic  - HD per renal  - Fevers, s/p cultures now with fungemia, bacteremia. Repeat TTE without signs of endocarditis. Repeat blood cultures 3/13 NGTD. c/w caspo and Zosyn. F/U further ID recs. Monitor levels.   - DVT ppx- SQH  - Dispo- Full code. Prognosis overall is poor.

## 2025-03-15 NOTE — PROGRESS NOTE ADULT - SUBJECTIVE AND OBJECTIVE BOX
[facility]    Dr. Swapnil Weber     Office [phone] (9 am to 5 pm)  Service: [phone] (5pm to 9am)  Also Available on TEAMS      RENAL PROGRESS NOTE: DATE OF SERVICE 03-15-25 @ 11:57    Patient is a 74y old  Male who presents with a chief complaint of 2/18/25 was in cath lab earlier today with vascular surgery for fistulogram and noted to have a WBC of 18 and sent to ED and admitted (04 Mar 2025 12:22)      Patient seen and examined at bedside. No chest pain/sob    VITALS:  T(F): 96.8 (03-15-25 @ 04:00), Max: 97.1 (03-14-25 @ 12:45)  HR: 62 (03-15-25 @ 10:38)  BP: 142/50 (03-15-25 @ 04:00)  RR: 15 (03-15-25 @ 04:00)  SpO2: 99% (03-15-25 @ 10:38)  Wt(kg): --    03-14 @ 07:01  -  03-15 @ 07:00  --------------------------------------------------------  IN: 400 mL / OUT: 2900 mL / NET: -2500 mL          PHYSICAL EXAM:  Constitutional: NAD  Neck: No JVD  Respiratory: CTAB, no wheezes, rales or rhonchi  Cardiovascular: S1, S2, RRR  Gastrointestinal: BS+, soft, NT/ND  Extremities: No peripheral edema    Hospital Medications:   MEDICATIONS  (STANDING):  albuterol    0.083% 2.5 milliGRAM(s) Nebulizer every 6 hours  amLODIPine   Tablet 10 milliGRAM(s) Oral daily  AQUAPHOR (petrolatum Ointment) 1 Application(s) Topical two times a day  atorvastatin 20 milliGRAM(s) Oral at bedtime  caspofungin IVPB 50 milliGRAM(s) IV Intermittent every 24 hours  caspofungin IVPB      chlorhexidine 0.12% Liquid 15 milliLiter(s) Oral Mucosa every 12 hours  chlorhexidine 2% Cloths 1 Application(s) Topical daily  dextrose 5%. 1000 milliLiter(s) (100 mL/Hr) IV Continuous <Continuous>  dextrose 5%. 1000 milliLiter(s) (50 mL/Hr) IV Continuous <Continuous>  dextrose 50% Injectable 25 Gram(s) IV Push once  dextrose 50% Injectable 12.5 Gram(s) IV Push once  dextrose 50% Injectable 25 Gram(s) IV Push once  epoetin reilly-epbx (RETACRIT) Injectable 55581 Unit(s) IV Push <User Schedule>  ferrous    sulfate Liquid 300 milliGRAM(s) Enteral Tube daily  glucagon  Injectable 1 milliGRAM(s) IntraMuscular once  heparin   Injectable 5000 Unit(s) SubCutaneous every 12 hours  hydrALAZINE 75 milliGRAM(s) Oral three times a day  insulin glargine Injectable (LANTUS) 12 Unit(s) SubCutaneous at bedtime  insulin lispro (ADMELOG) corrective regimen sliding scale   SubCutaneous every 6 hours  insulin lispro Injectable (ADMELOG) 6 Unit(s) SubCutaneous every 6 hours  labetalol 100 milliGRAM(s) Oral two times a day  Nephro-noam 1 Tablet(s) Oral daily  pantoprazole   Suspension 40 milliGRAM(s) Oral before breakfast  piperacillin/tazobactam IVPB.. 3.375 Gram(s) IV Intermittent every 12 hours  potassium phosphate IVPB 15 milliMole(s) IV Intermittent once  sodium chloride 0.9% for Nebulization 3 milliLiter(s) Nebulizer every 6 hours      LABS:  03-15    131[L]  |  92[L]  |  40[H]  ----------------------------<  153[H]  4.8   |  22  |  2.28[H]    Ca    9.4      15 Mar 2025 07:14  Phos  2.3     03-15  Mg     2.40     03-15      Creatinine Trend: 2.28 <--, 3.18 <--, 2.38 <--, 3.35 <--, 2.44 <--, 3.98 <--, 3.05 <--    Phosphorus: 2.3 mg/dL (03-15 @ 07:14)                              8.6    12.61 )-----------( 185      ( 15 Mar 2025 07:14 )             27.9     Urine Studies:  Urinalysis - [03-15-25 @ 07:14]      Color  / Appearance  / SG  / pH       Gluc 153 / Ketone   / Bili  / Urobili        Blood  / Protein  / Leuk Est  / Nitrite       RBC  / WBC  / Hyaline  / Gran  / Sq Epi  / Non Sq Epi  / Bacteria       Iron 46, TIBC 142, %sat 32      [02-19-25 @ 11:39]  Ferritin 3601      [02-19-25 @ 11:39]  PTH -- (Ca --)      [03-09-25 @ 05:20]   25  PTH -- (Ca --)      [05-26-24 @ 01:20]   122  TSH 1.660      [10-05-24 @ 08:37]  Lipid: chol --, , HDL --, LDL --      [10-18-24 @ 06:00]    HBsAb 654.8      [02-19-25 @ 19:05]  HBsAg Nonreact      [02-19-25 @ 19:05]  HBcAb Nonreact      [02-19-25 @ 19:05]  HCV 0.19, Nonreact      [02-19-25 @ 19:05]      RADIOLOGY & ADDITIONAL STUDIES:

## 2025-03-16 LAB
-  AMPHOTERICIN B: SIGNIFICANT CHANGE UP
-  ANIDULAFUNGIN: SIGNIFICANT CHANGE UP
-  CASPOFUNGIN: SIGNIFICANT CHANGE UP
-  FLUCONAZOLE: SIGNIFICANT CHANGE UP
-  MICAFUNGIN: SIGNIFICANT CHANGE UP
-  POSACONAZOLE: SIGNIFICANT CHANGE UP
-  VORICONAZOLE: SIGNIFICANT CHANGE UP
ANION GAP SERPL CALC-SCNC: 15 MMOL/L — HIGH (ref 7–14)
BUN SERPL-MCNC: 53 MG/DL — HIGH (ref 7–23)
CALCIUM SERPL-MCNC: 9.5 MG/DL — SIGNIFICANT CHANGE UP (ref 8.4–10.5)
CHLORIDE SERPL-SCNC: 91 MMOL/L — LOW (ref 98–107)
CO2 SERPL-SCNC: 24 MMOL/L — SIGNIFICANT CHANGE UP (ref 22–31)
CREAT SERPL-MCNC: 3.12 MG/DL — HIGH (ref 0.5–1.3)
CULTURE RESULTS: ABNORMAL
EGFR: 20 ML/MIN/1.73M2 — LOW
GLUCOSE BLDC GLUCOMTR-MCNC: 183 MG/DL — HIGH (ref 70–99)
GLUCOSE BLDC GLUCOMTR-MCNC: 217 MG/DL — HIGH (ref 70–99)
GLUCOSE BLDC GLUCOMTR-MCNC: 221 MG/DL — HIGH (ref 70–99)
GLUCOSE BLDC GLUCOMTR-MCNC: 229 MG/DL — HIGH (ref 70–99)
GLUCOSE SERPL-MCNC: 125 MG/DL — HIGH (ref 70–99)
HCT VFR BLD CALC: 29.5 % — LOW (ref 39–50)
HGB BLD-MCNC: 9 G/DL — LOW (ref 13–17)
MAGNESIUM SERPL-MCNC: 2.6 MG/DL — SIGNIFICANT CHANGE UP (ref 1.6–2.6)
MCHC RBC-ENTMCNC: 24.9 PG — LOW (ref 27–34)
MCHC RBC-ENTMCNC: 30.5 G/DL — LOW (ref 32–36)
MCV RBC AUTO: 81.5 FL — SIGNIFICANT CHANGE UP (ref 80–100)
METHOD TYPE: SIGNIFICANT CHANGE UP
NRBC # BLD AUTO: 0.21 K/UL — HIGH (ref 0–0)
NRBC # FLD: 0.21 K/UL — HIGH (ref 0–0)
NRBC BLD AUTO-RTO: 2 /100 WBCS — HIGH (ref 0–0)
ORGANISM # SPEC MICROSCOPIC CNT: ABNORMAL
PLATELET # BLD AUTO: 314 K/UL — SIGNIFICANT CHANGE UP (ref 150–400)
POTASSIUM SERPL-MCNC: 4.9 MMOL/L — SIGNIFICANT CHANGE UP (ref 3.5–5.3)
POTASSIUM SERPL-SCNC: 4.9 MMOL/L — SIGNIFICANT CHANGE UP (ref 3.5–5.3)
RBC # BLD: 3.62 M/UL — LOW (ref 4.2–5.8)
RBC # FLD: 19.4 % — HIGH (ref 10.3–14.5)
SODIUM SERPL-SCNC: 130 MMOL/L — LOW (ref 135–145)
SPECIMEN SOURCE: SIGNIFICANT CHANGE UP
WBC # BLD: 13.13 K/UL — HIGH (ref 3.8–10.5)
WBC # FLD AUTO: 13.13 K/UL — HIGH (ref 3.8–10.5)

## 2025-03-16 PROCEDURE — 99233 SBSQ HOSP IP/OBS HIGH 50: CPT

## 2025-03-16 RX ADMIN — INSULIN LISPRO 1: 100 INJECTION, SOLUTION INTRAVENOUS; SUBCUTANEOUS at 00:13

## 2025-03-16 RX ADMIN — LABETALOL HYDROCHLORIDE 100 MILLIGRAM(S): 200 TABLET, FILM COATED ORAL at 05:02

## 2025-03-16 RX ADMIN — ATORVASTATIN CALCIUM 20 MILLIGRAM(S): 80 TABLET, FILM COATED ORAL at 22:11

## 2025-03-16 RX ADMIN — Medication 25 GRAM(S): at 17:06

## 2025-03-16 RX ADMIN — Medication 1 APPLICATION(S): at 11:02

## 2025-03-16 RX ADMIN — Medication 1 TABLET(S): at 11:02

## 2025-03-16 RX ADMIN — CASPOFUNGIN ACETATE 260 MILLIGRAM(S): 5 INJECTION, POWDER, LYOPHILIZED, FOR SOLUTION INTRAVENOUS at 11:04

## 2025-03-16 RX ADMIN — AMLODIPINE BESYLATE 10 MILLIGRAM(S): 10 TABLET ORAL at 05:01

## 2025-03-16 RX ADMIN — INSULIN LISPRO 2: 100 INJECTION, SOLUTION INTRAVENOUS; SUBCUTANEOUS at 23:37

## 2025-03-16 RX ADMIN — Medication 40 MILLIGRAM(S): at 07:28

## 2025-03-16 RX ADMIN — INSULIN LISPRO 6 UNIT(S): 100 INJECTION, SOLUTION INTRAVENOUS; SUBCUTANEOUS at 00:14

## 2025-03-16 RX ADMIN — INSULIN LISPRO 6 UNIT(S): 100 INJECTION, SOLUTION INTRAVENOUS; SUBCUTANEOUS at 17:08

## 2025-03-16 RX ADMIN — INSULIN GLARGINE-YFGN 12 UNIT(S): 100 INJECTION, SOLUTION SUBCUTANEOUS at 00:14

## 2025-03-16 RX ADMIN — Medication 15 MILLILITER(S): at 17:06

## 2025-03-16 RX ADMIN — INSULIN LISPRO 6 UNIT(S): 100 INJECTION, SOLUTION INTRAVENOUS; SUBCUTANEOUS at 11:13

## 2025-03-16 RX ADMIN — Medication 2.5 MILLIGRAM(S): at 21:17

## 2025-03-16 RX ADMIN — INSULIN LISPRO 2: 100 INJECTION, SOLUTION INTRAVENOUS; SUBCUTANEOUS at 11:13

## 2025-03-16 RX ADMIN — Medication 300 MILLIGRAM(S): at 11:02

## 2025-03-16 RX ADMIN — INSULIN LISPRO 6 UNIT(S): 100 INJECTION, SOLUTION INTRAVENOUS; SUBCUTANEOUS at 05:03

## 2025-03-16 RX ADMIN — HEPARIN SODIUM 5000 UNIT(S): 1000 INJECTION INTRAVENOUS; SUBCUTANEOUS at 05:00

## 2025-03-16 RX ADMIN — WHITE PETROLATUM 1 APPLICATION(S): 1 OINTMENT TOPICAL at 05:02

## 2025-03-16 RX ADMIN — Medication 25 GRAM(S): at 05:03

## 2025-03-16 RX ADMIN — Medication 2.5 MILLIGRAM(S): at 15:54

## 2025-03-16 RX ADMIN — INSULIN GLARGINE-YFGN 12 UNIT(S): 100 INJECTION, SOLUTION SUBCUTANEOUS at 23:36

## 2025-03-16 RX ADMIN — HEPARIN SODIUM 5000 UNIT(S): 1000 INJECTION INTRAVENOUS; SUBCUTANEOUS at 17:06

## 2025-03-16 RX ADMIN — Medication 75 MILLIGRAM(S): at 05:01

## 2025-03-16 RX ADMIN — LABETALOL HYDROCHLORIDE 100 MILLIGRAM(S): 200 TABLET, FILM COATED ORAL at 17:06

## 2025-03-16 RX ADMIN — Medication 15 MILLILITER(S): at 05:03

## 2025-03-16 RX ADMIN — INSULIN LISPRO 6 UNIT(S): 100 INJECTION, SOLUTION INTRAVENOUS; SUBCUTANEOUS at 23:37

## 2025-03-16 RX ADMIN — Medication 2.5 MILLIGRAM(S): at 07:40

## 2025-03-16 RX ADMIN — Medication 75 MILLIGRAM(S): at 13:14

## 2025-03-16 RX ADMIN — INSULIN LISPRO 2: 100 INJECTION, SOLUTION INTRAVENOUS; SUBCUTANEOUS at 17:07

## 2025-03-16 RX ADMIN — Medication 2.5 MILLIGRAM(S): at 03:25

## 2025-03-16 RX ADMIN — INSULIN LISPRO 1: 100 INJECTION, SOLUTION INTRAVENOUS; SUBCUTANEOUS at 05:02

## 2025-03-16 RX ADMIN — WHITE PETROLATUM 1 APPLICATION(S): 1 OINTMENT TOPICAL at 17:07

## 2025-03-16 RX ADMIN — Medication 75 MILLIGRAM(S): at 22:11

## 2025-03-16 NOTE — PROGRESS NOTE ADULT - SUBJECTIVE AND OBJECTIVE BOX
[facility]  AUGUST Carbajal Y. Patel, S. Shah, G. Casimir  [phone]  ([phone] - weekdays after 5pm and weekends)    Name: JIMMY BLAND  Age/Gender: 74y Male  MRN: [id number]    Interval History:  Notes reviewed.   No concerning overnight events.  Afebrile.     Allergies: No Known Allergies      Objective:  Vitals:   T(F): 97.8 (03-16-25 @ 04:00), Max: 98.3 (03-15-25 @ 12:00)  HR: 69 (03-16-25 @ 04:00) (62 - 70)  BP: 129/51 (03-16-25 @ 04:00) (107/47 - 136/52)  RR: 16 (03-16-25 @ 04:00) (16 - 17)  SpO2: 100% (03-16-25 @ 04:00) (98% - 100%)  Physical Examination:  General: frail appearing  HEENT: anicteric, tracheostomy, on vent  Cardio: S1, S2, normal rate  Resp: rhonchi  Abd: Soft. Nondistended. PEG  Ext: no LE edema  Skin: warm, dry    Laboratory Studies:  CBC:                       8.6    12.61 )-----------( 185      ( 15 Mar 2025 07:14 )             27.9     WBC Trend:  12.61 03-15-25 @ 07:14  16.47 03-14-25 @ 09:45  18.65 03-13-25 @ 06:30  18.73 03-12-25 @ 04:53  14.20 03-11-25 @ 05:50  23.01 03-10-25 @ 17:00    CMP: 03-15    131[L]  |  92[L]  |  40[H]  ----------------------------<  153[H]  4.8   |  22  |  2.28[H]    Ca    9.4      15 Mar 2025 07:14  Phos  2.3     03-15  Mg     2.40     03-15            Urinalysis Basic - ( 15 Mar 2025 07:14 )    Color: x / Appearance: x / SG: x / pH: x  Gluc: 153 mg/dL / Ketone: x  / Bili: x / Urobili: x   Blood: x / Protein: x / Nitrite: x   Leuk Esterase: x / RBC: x / WBC x   Sq Epi: x / Non Sq Epi: x / Bacteria: x      Microbiology: reviewed     Culture - Blood (collected 03-14-25 @ 10:15)  Source: Blood Dialysis Catheter  Preliminary Report (03-15-25 @ 17:02):    No growth at 24 hours    Culture - Blood (collected 03-14-25 @ 09:45)  Source: Blood Dialysis Catheter  Preliminary Report (03-15-25 @ 17:02):    No growth at 24 hours    Culture - Blood (collected 03-13-25 @ 08:51)  Source: Blood Blood-Peripheral  Preliminary Report (03-15-25 @ 13:01):    No growth at 48 Hours    Culture - Blood (collected 03-13-25 @ 08:30)  Source: Blood Blood-Peripheral  Preliminary Report (03-15-25 @ 13:01):    No growth at 48 Hours    Culture - Blood (collected 03-12-25 @ 04:52)  Source: Blood Blood-Venous  Gram Stain (03-13-25 @ 06:28):    Growth in anaerobic bottle: Gram Positive Cocci in Clusters and Gram    Positive Cocci in Pairs and Chains  Preliminary Report (03-15-25 @ 16:01):    Growth in anaerobic bottle: Enterococcus faecium    Growth in anaerobic bottle: Staphylococcus epidermidis "Susceptibilities    not performed"    Growth in anaerobic bottle: Candida auris Referred to Mycology for    susceptibility    Direct identification isavailable within approximately 3-5    hours either by Blood Panel Multiplexed PCR or Direct    MALDI-TOF. Details: [email or web address]  Organism: Blood Culture PCR  Enterococcus faecium (03-15-25 @ 16:00)  Organism: Enterococcus faecium (03-15-25 @ 16:00)      Method Type: VIDA      -  Ampicillin: R >8 Predicts results to ampicillin/sulbactam, amoxacillin-clavulanate and  piperacillin-tazobactam.      -  Vancomycin: S 0.5      -  Gentamicin synergy: S <=500      -  Streptomycin synergy: S <=1000  Organism: Blood Culture PCR (03-13-25 @ 09:47)      Method Type: PCR      -  Enterococcus faecium: Detec      -  Staphylococcus epidermidis, Methicillin resistant: Detec      -  Candida auris: Detec    Culture - Blood (collected 03-12-25 @ 04:40)  Source: Blood Blood-Venous  Preliminary Report (03-15-25 @ 10:01):    No growth at 72 Hours    Culture - Blood (collected 03-09-25 @ 05:20)  Source: Blood Blood-Peripheral  Final Report (03-14-25 @ 09:01):    No growth at 5 days    Culture - Blood (collected 03-09-25 @ 05:05)  Source: Blood Blood-Venous  Final Report (03-14-25 @ 09:01):    No growth at 5 days    Culture - Blood (collected 03-03-25 @ 16:27)  Source: Blood Blood-Peripheral  Final Report (03-08-25 @ 22:01):    No growth at 5 days    Culture - Blood (collected 03-03-25 @ 16:23)  Source: Blood Blood-Venous  Final Report (03-08-25 @ 22:01):    No growth at 5 days    Culture - Sputum (collected 03-03-25 @ 14:10)  Source: Trach Asp Tracheal Aspirate  Gram Stain (03-04-25 @ 04:28):    Few polymorphonuclear leukocytes per low power field    No Squamous epithelial cells per low power field    Moderate Gram Negative Rods seen per oil power field  Final Report (03-05-25 @ 16:12):    Numerous Pseudomonas aeruginosa (Carbapenem Resistant)    Commensal dominick consistent with body site  Organism: Pseudomonas aeruginosa (Carbapenem Resistant) (03-05-25 @ 16:12)  Organism: Pseudomonas aeruginosa (Carbapenem Resistant) (03-05-25 @ 16:12)      Method Type: CarbaR      -  Resistance Gene to Carbapenem: Nondet  Organism: Pseudomonas aeruginosa (Carbapenem Resistant) (03-05-25 @ 16:12)      Method Type: VIDA      -  Aztreonam: S <=4      -  Cefepime: S <=2      -  Ceftazidime: S 4      -  Ceftazidime/Avibactam: S <=4      -  Ceftolozane/tazobactam: S <=2      -  Ciprofloxacin: S <=0.25      -  Imipenem: R 8      -  Levofloxacin: S <=0.5      -  Meropenem: R 8      -  Piperacillin/Tazobactam: S <=8        Radiology: reviewed     Medications:  albuterol    0.083% 2.5 milliGRAM(s) Nebulizer every 6 hours  amLODIPine   Tablet 10 milliGRAM(s) Oral daily  AQUAPHOR (petrolatum Ointment) 1 Application(s) Topical two times a day  atorvastatin 20 milliGRAM(s) Oral at bedtime  caspofungin IVPB 50 milliGRAM(s) IV Intermittent every 24 hours  caspofungin IVPB      chlorhexidine 0.12% Liquid 15 milliLiter(s) Oral Mucosa every 12 hours  chlorhexidine 2% Cloths 1 Application(s) Topical daily  dextrose 5%. 1000 milliLiter(s) IV Continuous <Continuous>  dextrose 5%. 1000 milliLiter(s) IV Continuous <Continuous>  dextrose 50% Injectable 25 Gram(s) IV Push once  dextrose 50% Injectable 12.5 Gram(s) IV Push once  dextrose 50% Injectable 25 Gram(s) IV Push once  dextrose Oral Gel 15 Gram(s) Oral once PRN  epoetin reilly-epbx (RETACRIT) Injectable 15003 Unit(s) IV Push <User Schedule>  ferrous    sulfate Liquid 300 milliGRAM(s) Enteral Tube daily  glucagon  Injectable 1 milliGRAM(s) IntraMuscular once  heparin   Injectable 5000 Unit(s) SubCutaneous every 12 hours  hydrALAZINE 75 milliGRAM(s) Oral three times a day  insulin glargine Injectable (LANTUS) 12 Unit(s) SubCutaneous at bedtime  insulin lispro (ADMELOG) corrective regimen sliding scale   SubCutaneous every 6 hours  insulin lispro Injectable (ADMELOG) 6 Unit(s) SubCutaneous every 6 hours  labetalol 100 milliGRAM(s) Oral two times a day  Nephro-noam 1 Tablet(s) Oral daily  pantoprazole   Suspension 40 milliGRAM(s) Oral before breakfast  piperacillin/tazobactam IVPB.. 3.375 Gram(s) IV Intermittent every 12 hours  potassium phosphate IVPB 15 milliMole(s) IV Intermittent once  sodium chloride 0.9% Bolus. 100 milliLiter(s) IV Bolus every 5 minutes PRN  sodium chloride 0.9% for Nebulization 3 milliLiter(s) Nebulizer every 6 hours    Antimicrobials:  caspofungin IVPB 50 milliGRAM(s) IV Intermittent every 24 hours  caspofungin IVPB      piperacillin/tazobactam IVPB.. 3.375 Gram(s) IV Intermittent every 12 hours

## 2025-03-16 NOTE — PROGRESS NOTE ADULT - SUBJECTIVE AND OBJECTIVE BOX
RCU PROGRESS NOTE     CHIEF COMPLAINT: Patient is a 74y old  Male who presents with a chief complaint of 2/18/25 was in cath lab earlier today with vascular surgery for fistulogram and noted to have a WBC of 18 and sent to ED and admitted (04 Mar 2025 12:22)      INTERVAL EVENTS:    REVIEW OF SYSTEMS: Seen by bedside during AM rounds and     Mode: AC/ CMV (Assist Control/ Continuous Mandatory Ventilation), RR (machine): 15, TV (machine): 400, FiO2: 40, PEEP: 6, ITime: 0.8, MAP: 9, PIP: 19      OBJECTIVE:  ICU Vital Signs Last 24 Hrs  T(C): 36.6 (16 Mar 2025 04:00), Max: 36.8 (15 Mar 2025 12:00)  T(F): 97.8 (16 Mar 2025 04:00), Max: 98.3 (15 Mar 2025 12:00)  HR: 69 (16 Mar 2025 04:00) (62 - 70)  BP: 129/51 (16 Mar 2025 04:00) (107/47 - 136/52)  BP(mean): 74 (16 Mar 2025 04:00) (64 - 75)  ABP: --  ABP(mean): --  RR: 16 (16 Mar 2025 04:00) (16 - 17)  SpO2: 100% (16 Mar 2025 04:00) (98% - 100%)    O2 Parameters below as of 16 Mar 2025 08:32  Patient On (Oxygen Delivery Method): ventilator          Mode: AC/ CMV (Assist Control/ Continuous Mandatory Ventilation), RR (machine): 15, TV (machine): 400, FiO2: 40, PEEP: 6, ITime: 0.8, MAP: 9, PIP: 19    03-15 @ 07:01  -  03-16 @ 07:00  --------------------------------------------------------  IN: 1660 mL / OUT: 2400 mL / NET: -740 mL      CAPILLARY BLOOD GLUCOSE      POCT Blood Glucose.: 183 mg/dL (16 Mar 2025 04:59)      HOSPITAL MEDICATIONS:  MEDICATIONS  (STANDING):  albuterol    0.083% 2.5 milliGRAM(s) Nebulizer every 6 hours  amLODIPine   Tablet 10 milliGRAM(s) Oral daily  AQUAPHOR (petrolatum Ointment) 1 Application(s) Topical two times a day  atorvastatin 20 milliGRAM(s) Oral at bedtime  caspofungin IVPB 50 milliGRAM(s) IV Intermittent every 24 hours  caspofungin IVPB      chlorhexidine 0.12% Liquid 15 milliLiter(s) Oral Mucosa every 12 hours  chlorhexidine 2% Cloths 1 Application(s) Topical daily  dextrose 5%. 1000 milliLiter(s) (100 mL/Hr) IV Continuous <Continuous>  dextrose 5%. 1000 milliLiter(s) (50 mL/Hr) IV Continuous <Continuous>  dextrose 50% Injectable 25 Gram(s) IV Push once  dextrose 50% Injectable 12.5 Gram(s) IV Push once  dextrose 50% Injectable 25 Gram(s) IV Push once  epoetin reilly-epbx (RETACRIT) Injectable 97839 Unit(s) IV Push <User Schedule>  ferrous    sulfate Liquid 300 milliGRAM(s) Enteral Tube daily  glucagon  Injectable 1 milliGRAM(s) IntraMuscular once  heparin   Injectable 5000 Unit(s) SubCutaneous every 12 hours  hydrALAZINE 75 milliGRAM(s) Oral three times a day  insulin glargine Injectable (LANTUS) 12 Unit(s) SubCutaneous at bedtime  insulin lispro (ADMELOG) corrective regimen sliding scale   SubCutaneous every 6 hours  insulin lispro Injectable (ADMELOG) 6 Unit(s) SubCutaneous every 6 hours  labetalol 100 milliGRAM(s) Oral two times a day  Nephro-noam 1 Tablet(s) Oral daily  pantoprazole   Suspension 40 milliGRAM(s) Oral before breakfast  piperacillin/tazobactam IVPB.. 3.375 Gram(s) IV Intermittent every 12 hours  sodium chloride 0.9% for Nebulization 3 milliLiter(s) Nebulizer every 6 hours    MEDICATIONS  (PRN):  dextrose Oral Gel 15 Gram(s) Oral once PRN Blood Glucose LESS THAN 70 milliGRAM(s)/deciliter  sodium chloride 0.9% Bolus. 100 milliLiter(s) IV Bolus every 5 minutes PRN SBP LESS THAN or EQUAL to 80 mmHg      PHYSICAL EXAMINATION    LABS:                        9.0    13.13 )-----------( 314      ( 16 Mar 2025 08:15 )             29.5     03-16    130[L]  |  91[L]  |  53[H]  ----------------------------<  125[H]  4.9   |  24  |  3.12[H]    Ca    9.5      16 Mar 2025 08:15  Phos  2.7     03-16  Mg     2.60     03-16        Urinalysis Basic - ( 16 Mar 2025 08:15 )    Color: x / Appearance: x / SG: x / pH: x  Gluc: 125 mg/dL / Ketone: x  / Bili: x / Urobili: x   Blood: x / Protein: x / Nitrite: x   Leuk Esterase: x / RBC: x / WBC x   Sq Epi: x / Non Sq Epi: x / Bacteria: x            PAST MEDICAL & SURGICAL HISTORY:  ESRD on hemodialysis      HTN (hypertension)      Insulin dependent type 2 diabetes mellitus      History of cerebrovascular accident (CVA) with residual deficit      History of DVT of lower extremity      HLD (hyperlipidemia)      CVA (cerebrovascular accident)      Aphasia      Tracheostomy in place      PEG (percutaneous endoscopic gastrostomy) status      GERD (gastroesophageal reflux disease)      Constipation      Pressure ulcer      Arteriovenous fistula      S/P percutaneous endoscopic gastrostomy (PEG) tube placement      History of tracheostomy          FAMILY HISTORY:  FHx: diabetes mellitus (Father, Aunt)        Social History:      RADIOLOGY:  [ ] Reviewed and interpreted by me    PULMONARY FUNCTION TESTS:    EKG: RCU PROGRESS NOTE     CHIEF COMPLAINT: Patient is a 74y old  Male who presents with a chief complaint of 2/18/25 was in cath lab earlier today with vascular surgery for fistulogram and noted to have a WBC of 18 and sent to ED and admitted (04 Mar 2025 12:22)      INTERVAL EVENTS:  - NO interval events ovenright    REVIEW OF SYSTEMS: Seen by bedside during AM rounds and unable to assess ROS as vented     Mode: AC/ CMV (Assist Control/ Continuous Mandatory Ventilation), RR (machine): 15, TV (machine): 400, FiO2: 40, PEEP: 6, ITime: 0.8, MAP: 9, PIP: 19      OBJECTIVE:  ICU Vital Signs Last 24 Hrs  T(C): 36.6 (16 Mar 2025 04:00), Max: 36.8 (15 Mar 2025 12:00)  T(F): 97.8 (16 Mar 2025 04:00), Max: 98.3 (15 Mar 2025 12:00)  HR: 69 (16 Mar 2025 04:00) (62 - 70)  BP: 129/51 (16 Mar 2025 04:00) (107/47 - 136/52)  BP(mean): 74 (16 Mar 2025 04:00) (64 - 75)  ABP: --  ABP(mean): --  RR: 16 (16 Mar 2025 04:00) (16 - 17)  SpO2: 100% (16 Mar 2025 04:00) (98% - 100%)    O2 Parameters below as of 16 Mar 2025 08:32  Patient On (Oxygen Delivery Method): ventilator          Mode: AC/ CMV (Assist Control/ Continuous Mandatory Ventilation), RR (machine): 15, TV (machine): 400, FiO2: 40, PEEP: 6, ITime: 0.8, MAP: 9, PIP: 19    03-15 @ 07:01  -  03-16 @ 07:00  --------------------------------------------------------  IN: 1660 mL / OUT: 2400 mL / NET: -740 mL      CAPILLARY BLOOD GLUCOSE  POCT Blood Glucose.: 183 mg/dL (16 Mar 2025 04:59)      HOSPITAL MEDICATIONS:  MEDICATIONS  (STANDING):  albuterol    0.083% 2.5 milliGRAM(s) Nebulizer every 6 hours  amLODIPine   Tablet 10 milliGRAM(s) Oral daily  AQUAPHOR (petrolatum Ointment) 1 Application(s) Topical two times a day  atorvastatin 20 milliGRAM(s) Oral at bedtime  caspofungin IVPB 50 milliGRAM(s) IV Intermittent every 24 hours  caspofungin IVPB      chlorhexidine 0.12% Liquid 15 milliLiter(s) Oral Mucosa every 12 hours  chlorhexidine 2% Cloths 1 Application(s) Topical daily  dextrose 5%. 1000 milliLiter(s) (100 mL/Hr) IV Continuous <Continuous>  dextrose 5%. 1000 milliLiter(s) (50 mL/Hr) IV Continuous <Continuous>  dextrose 50% Injectable 25 Gram(s) IV Push once  dextrose 50% Injectable 12.5 Gram(s) IV Push once  dextrose 50% Injectable 25 Gram(s) IV Push once  epoetin reilly-epbx (RETACRIT) Injectable 87380 Unit(s) IV Push <User Schedule>  ferrous    sulfate Liquid 300 milliGRAM(s) Enteral Tube daily  glucagon  Injectable 1 milliGRAM(s) IntraMuscular once  heparin   Injectable 5000 Unit(s) SubCutaneous every 12 hours  hydrALAZINE 75 milliGRAM(s) Oral three times a day  insulin glargine Injectable (LANTUS) 12 Unit(s) SubCutaneous at bedtime  insulin lispro (ADMELOG) corrective regimen sliding scale   SubCutaneous every 6 hours  insulin lispro Injectable (ADMELOG) 6 Unit(s) SubCutaneous every 6 hours  labetalol 100 milliGRAM(s) Oral two times a day  Nephro-noam 1 Tablet(s) Oral daily  pantoprazole   Suspension 40 milliGRAM(s) Oral before breakfast  piperacillin/tazobactam IVPB.. 3.375 Gram(s) IV Intermittent every 12 hours  sodium chloride 0.9% for Nebulization 3 milliLiter(s) Nebulizer every 6 hours    MEDICATIONS  (PRN):  dextrose Oral Gel 15 Gram(s) Oral once PRN Blood Glucose LESS THAN 70 milliGRAM(s)/deciliter  sodium chloride 0.9% Bolus. 100 milliLiter(s) IV Bolus every 5 minutes PRN SBP LESS THAN or EQUAL to 80 mmHg      PHYSICAL EXAMINATION  General: NAD   HEENT: Trach present  Cards: S1/S2, no murmurs   Pulm: Course vent sounds bilaterally and slightly decreased in LLL. No wheezes.   Abdomen: Soft, nondistended and nontender. PEG (+)   Extremities: No pedal edema. THAI of left upper and lower extremities noted and right hemiparesis per baseline with CVA hx.  Neurology: Awakens with eyes open and follows commands, but quickly closes eyes and nods yes and no to simple questions with no acute focal neurological deficits     LABS:                        9.0    13.13 )-----------( 314      ( 16 Mar 2025 08:15 )             29.5     03-16    130[L]  |  91[L]  |  53[H]  ----------------------------<  125[H]  4.9   |  24  |  3.12[H]    Ca    9.5      16 Mar 2025 08:15  Phos  2.7     03-16  Mg     2.60     03-16        Urinalysis Basic - ( 16 Mar 2025 08:15 )  Color: x / Appearance: x / SG: x / pH: x  Gluc: 125 mg/dL / Ketone: x  / Bili: x / Urobili: x   Blood: x / Protein: x / Nitrite: x   Leuk Esterase: x / RBC: x / WBC x   Sq Epi: x / Non Sq Epi: x / Bacteria: x            PAST MEDICAL & SURGICAL HISTORY:  ESRD on hemodialysis      HTN (hypertension)      Insulin dependent type 2 diabetes mellitus      History of cerebrovascular accident (CVA) with residual deficit      History of DVT of lower extremity      HLD (hyperlipidemia)      CVA (cerebrovascular accident)      Aphasia      Tracheostomy in place      PEG (percutaneous endoscopic gastrostomy) status      GERD (gastroesophageal reflux disease)      Constipation      Pressure ulcer      Arteriovenous fistula      S/P percutaneous endoscopic gastrostomy (PEG) tube placement      History of tracheostomy          FAMILY HISTORY:  FHx: diabetes mellitus (Father, Aunt)        Social History:      RADIOLOGY:  [ ] Reviewed and interpreted by me    PULMONARY FUNCTION TESTS:    EKG:

## 2025-03-16 NOTE — PROGRESS NOTE ADULT - ASSESSMENT
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 Patent recently admitted in 6/2024 for left pontine and cerebellar CVA requiring tracheostomy. While at [facility] patient decannulated and passed SS with advanced diet to easy to chew with thin liquids, but complicated COVID requiring transfer to [facility] in 7/2024 and then ultimately discharged home. Per wife patient was doing well at home until 10/2024 when he was found with RLL with concern for aspiration requiring intubation, then tracheostomy, and ultimately discharged to NH with vent dependence in 11/2024. Patient now presented for RUE fistulogram and angioplasty with vascular, however course complicated by leukocytosis with concern for recurrent trachitis vs PNA and UTI, AOCD and hyperglycemia. Admitted to RCU for further management. Found with  PSA trachitis/ PNA and enterococcus faecium UTI. Course complicated by RUE brachial DVT and hypoxia with HD with concern for volume down vs PE? Volume removal held and hypoxia improved. AC given empirically and CTA CHEST with no PE. Case discussed with vascular and since brachial DVT appears old no need for chronic AC . Course further complicated by PSA LLL PNA and completed ABX followed by low grade fever and worsening leukocytosis. Found with Candida auris and E. Faecium bacteremia     NEUROLOGY  # Poor mentation second to metabolic encephalopathy vs psychosomatic/ depression   - Hx of L cerebellar and pontene embolic CVA x 2 with residual R hemiplegia   - AOx0, bedbound, and nonverbal at baseline per report, however per exam patient able to open eyes, able to follow commands on left (LUE>LLE) with SEVERE weakness, however noted with R hemiplegia per baseline  - Nods yes and no occasionally however keeps eyes closed likely psychosomatic vs metabolic encephalopathy with infections?   - Last CTH in 10/2024 with no acute findings   - Hold RPT CTH for now   - Monitor mentation     CARDIOVASCULAR  # Hx of HTN and HLD  - Continue on hydral 75 (increased 3/13), norvasc 10, and labetalol 100 mg BID   - Monitor BP     # Incidental Pericardial effusion   - Incidental small pericardial effusion seen adjacent to right ventricle, however IVC appears flat and LE without edema with low concern for RV failure.   - Prior TTE reviewed from 4/2024 with normal RVLVSF with no pericardial effusion noted at the time.   - TTE 2/23 with EF 65, normal LVRVSF, mild LAD, normal RA, mild pulmonary hypertension, and small pericardial effusion noted adjacent to the anterior right ventricle with no echocardiographic evidence of tamponade  - Monitor hemodynamics     RESPIRATORY  # Respiratory failure second to PNA vs volume down vs PE?   - Hx of COPD with chronic respiratory failure with tracheostomy and vent dependence  - Admitted for angioplasty of RUE AVF, however course complicated by leukocytosis with concern for recurrent trachitis/ PNA.   - Course complicated by hypoxia during HD likely volume down vs migration of RUE brachial DVT with PE?   - Held volume removal, bolus given, and hypoxia improved.   - CTA CHEST without PE  - Course complicated by LLL mucous plug and IPV started with improvement, however recurrent PNA concern and CT with worsening LLL atelectasis.    - Continue on albuterol, NS 0.9 and IPV  - Continue on vent 15/400/6/40 with PS trials as able    HEENT   # Hemoptysis   - Wife reported hemoptysis while at nursing home 2 weeks prior to admission   - No hemoptysis noted on admission   - Hemoptysis returned in house   - CTA CHEST 2/24 without PE  - Bronch 2/26 with no evidence of bleeding   - Bleeding resolved     GI  # Dysphagia with PEG   - Passed SS with advanced diet to easy to chew with thin liquids in 7/2024, however since recurrent aspiration in 10/2024 now with PEG/ vent dependence   - Continue on PEG TF and changed to bolus feeds   - Monitor tolerance     RENAL  # ESRD on HD   # Dehydration   - Resumed on HD with no flow issue from AVF   - Course complicated by hypoxia with concern for volume down given small IVC and elevated lactate on 2/21  - Volume removal held, lactate improved, and hypoxia improved.   - Continue on HD MWF in house per renal   - Return to HD MTRF outpatient   - Monitor renal function, electrolytes and UOP     # AVF trouble   # AVF oozing   - Presented for RUE fistulogram and angioplasty with vascular on 2/18   - Oozing from AVF post HD, surgicell placed, and AC held with improvement.  - Discussed with vascular and no need for AC given DVT as below is chronic   - RPT DOPPLER with again noted DVT in right stented brachial vein, however als noted with significantly elevated flow velocities with turbulence at the anastomotic site. Wall thickening noted at the proximal segment of the outflow vein, which is otherwise patent. Distal segment of the outflow vein appears patent without thrombosis evidence.   - Discussed with vascular and given patent AVF with no HD trouble no need for fistulogram at this time.     # Hyponatremia likely volume down   - Concern for volume issues, however overall appears volume down with serum osmo 307  - Sodium 128 and improved with HD/ bolus during HD.   - Trend sodium with HD for now     # Elevated lactate   - Lactate elevated likely second to volume down?   - Post bolus lactate trending down from 3.3 to 2.7 to 1.3    INFECTIOUS DISEASE  # Candida auris and E. Faecium bacteremia   - WBC rising and low grade fever   - PanCT with worsening LLL atelectasis and BL small PLEFs  - BCx 3/12 with MRSE, candida auris and enterococcus faecium   - BCx 3/13 and 3/14 negative   - TTE 3/13 negative for vegetations   - Optho eval 3/13 without evidence of endophthalmitis  - WOC eval 3/13 with no concern for wound infection   - Continue on vanco on HD days, zosyn and caspo (3/12 - )     # Recurrent LLL PNA   - CXR with LLL mucous plug   - POCUS with LLL consolidation vs atelectasis   - Recent BAL with PSA as below   - FULL RVP negative  - SCx with PSA  - Fevers returned and zosyn (3/3 - 3/11)     # Hemoptysis   - Hemoptysis as above   - BAL with PSA as prior   - No fever and monitor off ABX     # Trachitis vs PNA/ UTI   - Hx of steno and PSA in sputum   - Flu/ COVID negative   - MRSA negative   - UCx with enterococcus   - SCx with  PSA   - Found with leukocytosis and started on cefepime and christiano (2/19) and vanco on HD days (2/21).   - No further steno seen in sputum and continue on cefepime (2/19 - 2/25) and vanco on HD days (2/21, 2/22 - 2/25)   - WBC increased likely reactive to hypoxic event and now improving  - ID following     # PPX   - MRSA PCR negative  - Candida auris PCR POSITIVE  - Continue on isolation precautions per IC    HEME  # AOCD   - Presented for angioplasty and found with anemia to 6.7 s/p 1U PRBC 2/19 and 3/14 and 1/2 PRBC 2/23 and monitoring HH   - Anemia panel with concern for AOCD   - No overt signs of bleeding  - Continue on EPO and Fe     # Heparin resistance?   - PTT persistently low despite increasing heparin GTT   - Resume on heparin GTT and anti Xa level supra therapeutic on but PTT remained low  - Patient ultimately with concern for heparin resistance    VASCULAR  # RUE DVT   - RUE edema noted with age-indeterminate, non-occlusive deep vein thrombosis noted in the right brachial vein.   - Case discussed with vascular who recommends full AC   - CTH from prior with encephalomalcia, however no hx of intracranial bleed   - Prior UGIB while on AC, however discharged on eliquis in 7/2024 and completed 6 month course for RLE DVT   - Continue on heparin GTT, however held for hemoptysis and resistance   - Continued on argatroban with good tolerance and then changed to eliquis   - Case discussed vascular and DVT likely chronic and no need for eliquis     ENDOCRINE  # DM2 A1C 14.0 (1/2024) > 6.4 (2/2025)  - Presented with hyperglycemia and continued on lantus and ISS   - Increased lantus, however FS continues to trend in 200s and changed to NPH with improvement.  - TF interrupted for bronchoscopy and adjustment to bolus feeds and FS dropped.   - NPH stopped and FS improved, however trended back up with continuation of tube feeds.   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 Patent recently admitted in 6/2024 for left pontine and cerebellar CVA requiring tracheostomy. While at [facility] patient decannulated and passed SS with advanced diet to easy to chew with thin liquids, but complicated COVID requiring transfer to [facility] in 7/2024 and then ultimately discharged home. Per wife patient was doing well at home until 10/2024 when he was found with RLL with concern for aspiration requiring intubation, then tracheostomy, and ultimately discharged to NH with vent dependence in 11/2024. Patient now presented for RUE fistulogram and angioplasty with vascular, however course complicated by leukocytosis with concern for recurrent trachitis vs PNA and UTI, AOCD and hyperglycemia. Admitted to RCU for further management. Found with  PSA trachitis/ PNA and enterococcus faecium UTI. Course complicated by RUE brachial DVT and hypoxia with HD with concern for volume down vs PE? Volume removal held and hypoxia improved. AC given empirically and CTA CHEST with no PE. Case discussed with vascular and since brachial DVT appears old no need for chronic AC . Course further complicated by PSA LLL PNA and completed ABX followed by low grade fever and worsening leukocytosis. Found with MRSE, Candida auris and E. Faecium bacteremia     NEUROLOGY  # Poor mentation second to metabolic encephalopathy vs psychosomatic/ depression   - Hx of L cerebellar and pontene embolic CVA x 2 with residual R hemiplegia   - AOx0, bedbound, and nonverbal at baseline per report, however per exam patient able to open eyes, able to follow commands on left (LUE>LLE) with SEVERE weakness, however noted with R hemiplegia per baseline  - Nods yes and no occasionally however keeps eyes closed likely psychosomatic vs metabolic encephalopathy with infections?   - Last CTH in 10/2024 with no acute findings   - Hold [facility] CTH for now   - Monitor mentation     CARDIOVASCULAR  # Hx of HTN and HLD  - Continue on hydral 75 (increased 3/13), norvasc 10, and labetalol 100 mg BID   - Monitor BP     # Incidental Pericardial effusion   - Incidental small pericardial effusion seen adjacent to right ventricle, however IVC appears flat and LE without edema with low concern for RV failure.   - Prior TTE reviewed from 4/2024 with normal RVLVSF with no pericardial effusion noted at the time.   - TTE 2/23 with EF 65, normal LVRVSF, mild LAD, normal RA, mild pulmonary hypertension, and small pericardial effusion noted adjacent to the anterior right ventricle with no echocardiographic evidence of tamponade  - Monitor hemodynamics     RESPIRATORY  # Respiratory failure second to PNA vs volume down vs PE?   - Hx of COPD with chronic respiratory failure with tracheostomy and vent dependence  - Admitted for angioplasty of RUE AVF, however course complicated by leukocytosis with concern for recurrent trachitis/ PNA.   - Course complicated by hypoxia during HD likely volume down vs migration of RUE brachial DVT with PE?   - Held volume removal, bolus given, and hypoxia improved.   - CTA CHEST without PE  - Course complicated by LLL mucous plug and IPV started with improvement, however recurrent PNA concern and CT with worsening LLL atelectasis.    - Continue on albuterol, NS 0.9 and IPV  - Continue on vent 15/400/6/40 with PS trials as able    HEENT   # Hemoptysis   - Wife reported hemoptysis while at nursing home 2 weeks prior to admission   - No hemoptysis noted on admission   - Hemoptysis returned in house   - CTA CHEST 2/24 without PE  - Bronch 2/26 with no evidence of bleeding   - Bleeding resolved     GI  # Dysphagia with PEG   - Passed SS with advanced diet to easy to chew with thin liquids in 7/2024, however since recurrent aspiration in 10/2024 now with PEG/ vent dependence   - Continue on PEG TF and changed to bolus feeds   - Monitor tolerance     RENAL  # ESRD on HD   # Dehydration   - Resumed on HD with no flow issue from AVF   - Course complicated by hypoxia with concern for volume down given small IVC and elevated lactate on 2/21  - Volume removal held, lactate improved, and hypoxia improved.   - Continue on HD MWF in house per renal   - Return to HD MTRF outpatient   - Monitor renal function, electrolytes and UOP     # AVF trouble   # AVF oozing   - Presented for RUE fistulogram and angioplasty with vascular on 2/18   - Oozing from AVF post HD, surgicell placed, and AC held with improvement.  - Discussed with vascular and no need for AC given DVT as below is chronic   - RPT DOPPLER with again noted DVT in right stented brachial vein, however als noted with significantly elevated flow velocities with turbulence at the anastomotic site. Wall thickening noted at the proximal segment of the outflow vein, which is otherwise patent. Distal segment of the outflow vein appears patent without thrombosis evidence.   - Discussed with vascular and given patent AVF with no HD trouble no need for fistulogram at this time.     # Hyponatremia likely volume down   - Concern for volume issues, however overall appears volume down with serum osmo 307  - Sodium 128 and improved with HD/ bolus during HD.   - Trend sodium with HD for now     # Elevated lactate   - Lactate elevated likely second to volume down?   - Post bolus lactate trending down from 3.3 to 2.7 to 1.3    INFECTIOUS DISEASE  # Candida auris and E. Faecium bacteremia   - WBC rising and low grade fever   - PanCT with worsening LLL atelectasis and BL small PLEFs  - BCx 3/12 with MRSE, candida auris and enterococcus faecium   - BCx 3/13 and 3/14 negative   - TTE 3/13 negative for vegetations   - Optho eval 3/13 without evidence of endophthalmitis  - WOC eval 3/13 with no concern for wound infection  - Prior SCx with  PSA sensitive to zosyn    - Continue on vanco on HD days (3/13, 3/14 ...) and zosyn and caspo (3/12 - ) pending RPT SCx     # Recurrent LLL PNA   - CXR with LLL mucous plug   - POCUS with LLL consolidation vs atelectasis   - Recent BAL with PSA as below   - FULL RVP negative  - SCx with PSA  - Fevers returned and zosyn (3/3 - 3/11)     # Hemoptysis   - Hemoptysis as above   - BAL with PSA as prior   - No fever and monitor off ABX     # Trachitis vs PNA/ UTI   - Hx of steno and PSA in sputum   - Flu/ COVID negative   - MRSA negative   - UCx with enterococcus   - SCx with  PSA   - Found with leukocytosis and started on cefepime and christiano (2/19) and vanco on HD days (2/21).   - No further steno seen in sputum and continue on cefepime (2/19 - 2/25) and vanco on HD days (2/21, 2/22 - 2/25)   - WBC increased likely reactive to hypoxic event and now improving  - ID following     # PPX   - MRSA PCR negative  - Candida auris PCR POSITIVE  - Continue on isolation precautions per IC    HEME  # AOCD   - Presented for angioplasty and found with anemia to 6.7 s/p 1U PRBC 2/19 and 3/14 and 1/2 PRBC 2/23 and monitoring HH   - Anemia panel with concern for AOCD   - No overt signs of bleeding  - Continue on EPO and Fe     # Heparin resistance?   - PTT persistently low despite increasing heparin GTT   - Resume on heparin GTT and anti Xa level supra therapeutic on but PTT remained low  - Patient ultimately with concern for heparin resistance    VASCULAR  # RUE DVT   - RUE edema noted with age-indeterminate, non-occlusive deep vein thrombosis noted in the right brachial vein.   - Case discussed with vascular who recommends full AC   - CTH from prior with encephalomalcia, however no hx of intracranial bleed   - Prior UGIB while on AC, however discharged on eliquis in 7/2024 and completed 6 month course for RLE DVT   - Continue on heparin GTT, however held for hemoptysis and resistance   - Continued on argatroban with good tolerance and then changed to eliquis   - Case discussed vascular and DVT likely chronic and no need for eliquis     ENDOCRINE  # DM2 A1C 14.0 (1/2024) > 6.4 (2/2025)  - Presented with hyperglycemia and continued on lantus and ISS   - Increased lantus, however FS continues to trend in 200s and changed to NPH with improvement.  - TF interrupted for bronchoscopy and adjustment to bolus feeds and FS dropped.   - NPH stopped and FS improved, however trended back up with continuation of tube feeds.   - NPH resumed, however FS continues to wax and wane and case discussed with endocrine and changed to lantus 12 and lispro 6 with ISS  - Monitor FS and adjust as needed  - Endocrine following     ETHICS/ GOC    - Palliative discussion on 3/5  - Remains FULL CODE     DISPO - Back to NH when able

## 2025-03-16 NOTE — PROGRESS NOTE ADULT - ASSESSMENT
74-year-old male hx trach/vent - , CVA, COPD, stage renal disease on dialysis 4 times a week (MTRF) history of pressure ulcer.  Patient presents the ED today for elevated white count. nephrology consulted for dialysis needs    ESRD:  from [facility]  On HD MTTsF via an A-V fistula. s/p fistulogram by vascular 2/18  Continue MWF in hospital  Prolonged bleeding post HD -- vascular consulted. no plan for intervention.  s/p hd 3/12 uf 2L.  S/p HD on 03/14tolerated UF of 2.5L.  Received 1unit PRBC on 0/14  S/p PUF on 03/15  2 L  Next HD on Monday  consent from wife in dialysis unit.  monitor bmp    Hypertension  BP fluctuating; acceptable.  continue norvasc 10 daily and hydralazine increased to 50 tid 3/2  C/W hydralazine 75mg TID.  max uf as tolerated  monitor BP    Anemia:  s/p 1 unit PRBC 2/23  epo with hd  iron sat >20  Given 1 unit of PRBC again today.  monitor Hgb  Transfuse for Hg < 7.    leukocytosis  sepsis work up per team  On Zosyn and now vancomycin.  Monitor trough level.    hyponatremia  in setting of esrd and hyperglycemia  optimize dm control  hd per schedule with UF  monitor Na    Hypokalemia  Dialyze w/ 4K bath   Monitor K.    Hypermagnesemia  HD per schedule.  Monitor Mg.    CKD - MBD:  PTH 25 - low for CKD stage.  Received PhosNaK x1 on 3/8.  PO4 improved.  Monitor PO4 and Ca daily.

## 2025-03-16 NOTE — PROGRESS NOTE ADULT - SUBJECTIVE AND OBJECTIVE BOX
[facility]    Dr. Swapnil Weber     Office [phone] (9 am to 5 pm)  Service: [phone] (5pm to 9am)  Also Available on TEAMS      RENAL PROGRESS NOTE: DATE OF SERVICE 03-16-25 @ 11:34    Patient is a 74y old  Male who presents with a chief complaint of 2/18/25 was in cath lab earlier today with vascular surgery for fistulogram and noted to have a WBC of 18 and sent to ED and admitted (04 Mar 2025 12:22)      Patient seen and examined at bedside. No chest pain/sob    VITALS:  T(F): 97.8 (03-16-25 @ 04:00), Max: 98.3 (03-15-25 @ 12:00)  HR: 85 (03-16-25 @ 11:00)  BP: 129/51 (03-16-25 @ 04:00)  RR: 16 (03-16-25 @ 04:00)  SpO2: 96% (03-16-25 @ 11:00)  Wt(kg): --    03-15 @ 07:01  -  03-16 @ 07:00  --------------------------------------------------------  IN: 1660 mL / OUT: 2400 mL / NET: -740 mL          PHYSICAL EXAM:  Constitutional: NAD  Neck: No JVD  Respiratory: CTAB, no wheezes, rales or rhonchi  Cardiovascular: S1, S2, RRR  Gastrointestinal: BS+, soft, NT/ND  Extremities: No peripheral edema    Hospital Medications:   MEDICATIONS  (STANDING):  albuterol    0.083% 2.5 milliGRAM(s) Nebulizer every 6 hours  amLODIPine   Tablet 10 milliGRAM(s) Oral daily  AQUAPHOR (petrolatum Ointment) 1 Application(s) Topical two times a day  atorvastatin 20 milliGRAM(s) Oral at bedtime  caspofungin IVPB 50 milliGRAM(s) IV Intermittent every 24 hours  caspofungin IVPB      chlorhexidine 0.12% Liquid 15 milliLiter(s) Oral Mucosa every 12 hours  chlorhexidine 2% Cloths 1 Application(s) Topical daily  dextrose 5%. 1000 milliLiter(s) (100 mL/Hr) IV Continuous <Continuous>  dextrose 5%. 1000 milliLiter(s) (50 mL/Hr) IV Continuous <Continuous>  dextrose 50% Injectable 25 Gram(s) IV Push once  dextrose 50% Injectable 12.5 Gram(s) IV Push once  dextrose 50% Injectable 25 Gram(s) IV Push once  epoetin reilly-epbx (RETACRIT) Injectable 62390 Unit(s) IV Push <User Schedule>  ferrous    sulfate Liquid 300 milliGRAM(s) Enteral Tube daily  glucagon  Injectable 1 milliGRAM(s) IntraMuscular once  heparin   Injectable 5000 Unit(s) SubCutaneous every 12 hours  hydrALAZINE 75 milliGRAM(s) Oral three times a day  insulin glargine Injectable (LANTUS) 12 Unit(s) SubCutaneous at bedtime  insulin lispro (ADMELOG) corrective regimen sliding scale   SubCutaneous every 6 hours  insulin lispro Injectable (ADMELOG) 6 Unit(s) SubCutaneous every 6 hours  labetalol 100 milliGRAM(s) Oral two times a day  Nephro-noam 1 Tablet(s) Oral daily  pantoprazole   Suspension 40 milliGRAM(s) Oral before breakfast  piperacillin/tazobactam IVPB.. 3.375 Gram(s) IV Intermittent every 12 hours  sodium chloride 0.9% for Nebulization 3 milliLiter(s) Nebulizer every 6 hours      LABS:  03-16    130[L]  |  91[L]  |  53[H]  ----------------------------<  125[H]  4.9   |  24  |  3.12[H]    Ca    9.5      16 Mar 2025 08:15  Phos  2.7     03-16  Mg     2.60     03-16      Creatinine Trend: 3.12 <--, 2.28 <--, 3.18 <--, 2.38 <--, 3.35 <--, 2.44 <--, 3.98 <--    Phosphorus: 2.7 mg/dL (03-16 @ 08:15)                              9.0    13.13 )-----------( 314      ( 16 Mar 2025 08:15 )             29.5     Urine Studies:  Urinalysis - [03-16-25 @ 08:15]      Color  / Appearance  / SG  / pH       Gluc 125 / Ketone   / Bili  / Urobili        Blood  / Protein  / Leuk Est  / Nitrite       RBC  / WBC  / Hyaline  / Gran  / Sq Epi  / Non Sq Epi  / Bacteria       Iron 46, TIBC 142, %sat 32      [02-19-25 @ 11:39]  Ferritin 3601      [02-19-25 @ 11:39]  PTH -- (Ca --)      [03-09-25 @ 05:20]   25  PTH -- (Ca --)      [05-26-24 @ 01:20]   122  TSH 1.660      [10-05-24 @ 08:37]  Lipid: chol --, , HDL --, LDL --      [10-18-24 @ 06:00]    HBsAb 654.8      [02-19-25 @ 19:05]  HBsAg Nonreact      [02-19-25 @ 19:05]  HBcAb Nonreact      [02-19-25 @ 19:05]  HCV 0.19, Nonreact      [02-19-25 @ 19:05]      RADIOLOGY & ADDITIONAL STUDIES:

## 2025-03-16 NOTE — PROGRESS NOTE ADULT - ASSESSMENT
75 y/o M PMhx trach/vent (last changed 10/23/24), CVA x2 with residual R hemiplegia, COPD, ESRD on HD MWF who presented to ED for leukocytosis when initially planned for fistulogram and noted to have a WBC of 18.   he was treated for E faecium UTI, and  pseudomonas VAP s/p cefepime, completed 2/25  he developed fever and was started on zosyn  bronch cx grew pseudomonas    E faecium bacteremia, candidemia  blood cx from 3/12- cx w/ candida auris and E faecium, MRSE  - E faecium vanc sensitive  CT C/A/P- Multifocal pneumonia. Fluid and debris in the trachea and bronchi, left greater than right. Aspiration is a consideration. Infiltration of the soft tissues posterior to the bilateral ischia and right greater trochanter, without evidence of underlying drainable fluid collection.  TTE without evidence of vegetations  s/p optho eval- no evidence of endophthalmitis    pseudomonas PNA- treated  trach aspirate  pseudomonas  s/p cefepime x 7 days, completed 2/25  s/p zosyn x 7 days, completed 3/10    DVT  RUE US- age-indeterminate, non-occlusive deep vein thrombosis noted in the right brachial vein  on eliquis    ESRD  HD per nephrology    E faecium sensitive to vanc  Recommendations  c/w zosyn w/ last day 3/18  c/w caspofungin  f/u candida auris sensitivities  check vanc level prior to HD on 3/17, will redose after HD on 3/17  f/u repeat blood cultures- NGTD  aspiration precautions  contact isolation for candida auris    Steven Torres M.D.  [facility]  [phone]  After 5pm on weekdays and all day on weekends - please call [phone]  Available on [employer] teams    Thank you for consulting us and involving us in the management of this patients case. In addition to reviewing history, imaging, documents, labs, microbiology, took into account antibiotic stewardship, local antibiogram and infection control strategies and potential transmission issues.  75 y/o M PMhx trach/vent (last changed 10/23/24), CVA x2 with residual R hemiplegia, COPD, ESRD on HD MWF who presented to ED for leukocytosis when initially planned for fistulogram and noted to have a WBC of 18.   he was treated for E faecium UTI, and  pseudomonas VAP s/p cefepime, completed 2/25  he developed fever and was started on zosyn  bronch cx grew pseudomonas    E faecium bacteremia, candidemia  blood cx from 3/12- cx w/ candida auris and E faecium, MRSE  - E faecium vanc sensitive  CT C/A/P- Multifocal pneumonia. Fluid and debris in the trachea and bronchi, left greater than right. Aspiration is a consideration. Infiltration of the soft tissues posterior to the bilateral ischia and right greater trochanter, without evidence of underlying drainable fluid collection.  TTE without evidence of vegetations  s/p optho eval- no evidence of endophthalmitis    pseudomonas PNA- treated  trach aspirate  pseudomonas  s/p cefepime x 7 days, completed 2/25  s/p zosyn x 7 days, completed 3/10    DVT  RUE US- age-indeterminate, non-occlusive deep vein thrombosis noted in the right brachial vein  on eliquis    ESRD  HD per nephrology    E faecium sensitive to vanc  leukocytosis improving  Recommendations  c/w zosyn w/ last day 3/18  c/w caspofungin  f/u candida auris sensitivities  check vanc level prior to HD on 3/17, will redose after HD on 3/17  f/u repeat blood cultures- NGTD  aspiration precautions  contact isolation for candida auris    Steven Torres M.D.  [facility]  [phone]  After 5pm on weekdays and all day on weekends - please call [phone]  Available on [employer] teams    Thank you for consulting us and involving us in the management of this patients case. In addition to reviewing history, imaging, documents, labs, microbiology, took into account antibiotic stewardship, local antibiogram and infection control strategies and potential transmission issues.

## 2025-03-16 NOTE — PROGRESS NOTE ADULT - NS ATTEND AMEND GEN_ALL_CORE FT
74M with h/o ESRD on HD via AVF, HTN, DMII, and recent prolonged hospitalization (4-6/2024) for baclofen toxicity and acute ischemic CVA of the left talya/cerebellum c/b acute hypoxemic and hypercapnic respiratory failure s/p ETT intubation/MV with failure to wean from ventilator s/p tracheostomy with hospital course further complicated by aspiration and B cereus bacteremia and RLE DVT followed by citrobacter PNA, GIB i/s/o AOCD, and fistula stenosis s/p fistulogram 6/4 ultimately transferred to acute rehab, decannulated, and discharged home until 10/2024 when pt returned to OSH with aspiration PNA c/b hypoxemic respiratory failure requiring ETT intubation/MV followed by tracheostomy placement and d/c to LTCF ([facility]) 11/2024 now presenting to [facility] on 2/18/25 for RUE fistulogram/angioplasty with vascular sx but with hospital course c/b concern fo recurrent PNA and uncontrolled hyperglycemia admitted to RCU for further medical management.    Per vascular no further intervention, no need to be on full a/c 2/2 to fistula bleeding. Not tolerating full a/c.   Completed course of abx but now with C. auris fungemia (3/12), MRSE bacteremia and enterococcus bacteremia. Repeat Blood cultures 3/13 NGTD. CT with dense consolidation.   Repeat Wound care eval. TTE without signs of endocarditis. Ophtho eval - no e/o endophthalmitis       # Encephalopathy 2/2 CVA  # Acute on chronic hypoxemic and hypercapnic respiratory failure. S/P Trach now x2.   # Mucus plugging, atelectasias  # oropharyngeal dysphagia   # GIB  # Bleeding AV fistula  # pressure ulcer.   # ESRD on HD  # DVT  # C. auris fungemia  # Bacteremia.   - CVA with recent strokes, c/w statins, unable to tolerate full a/c  - c/w vent, previously decannulated now retrached. C/W albuterol and NS nebs. PS as tolerated.   - C/W peg tube feeds  - Bleeding AV fistula, per vascular no acute interventions are planned, Recommending against full a/c 2/2 to bleeding.   - Wound care for pressure ulcers  - Per vascular DVT likely chronic  - HD per renal  - Fevers, s/p cultures now with fungemia, bacteremia. Repeat TTE without signs of endocarditis. Repeat blood cultures 3/13 NGTD. c/w caspo and Zosyn. F/U further ID recs. Monitor levels.   - DVT ppx- SQH  - Dispo- Full code. Prognosis overall is poor.

## 2025-03-17 LAB
ANION GAP SERPL CALC-SCNC: 19 MMOL/L — HIGH (ref 7–14)
BASOPHILS # BLD AUTO: 0.04 K/UL — SIGNIFICANT CHANGE UP (ref 0–0.2)
BASOPHILS NFR BLD AUTO: 0.4 % — SIGNIFICANT CHANGE UP (ref 0–2)
BLOOD GAS VENOUS COMPREHENSIVE RESULT: SIGNIFICANT CHANGE UP
BUN SERPL-MCNC: 67 MG/DL — HIGH (ref 7–23)
CALCIUM SERPL-MCNC: 9.6 MG/DL — SIGNIFICANT CHANGE UP (ref 8.4–10.5)
CHLORIDE SERPL-SCNC: 89 MMOL/L — LOW (ref 98–107)
CO2 SERPL-SCNC: 22 MMOL/L — SIGNIFICANT CHANGE UP (ref 22–31)
CREAT SERPL-MCNC: 3.72 MG/DL — HIGH (ref 0.5–1.3)
EGFR: 17 ML/MIN/1.73M2 — LOW
EOSINOPHIL # BLD AUTO: 0.16 K/UL — SIGNIFICANT CHANGE UP (ref 0–0.5)
EOSINOPHIL NFR BLD AUTO: 1.5 % — SIGNIFICANT CHANGE UP (ref 0–6)
GLUCOSE BLDC GLUCOMTR-MCNC: 193 MG/DL — HIGH (ref 70–99)
GLUCOSE BLDC GLUCOMTR-MCNC: 197 MG/DL — HIGH (ref 70–99)
GLUCOSE BLDC GLUCOMTR-MCNC: 229 MG/DL — HIGH (ref 70–99)
GLUCOSE SERPL-MCNC: 151 MG/DL — HIGH (ref 70–99)
GRAM STN FLD: ABNORMAL
HCT VFR BLD CALC: 28.7 % — LOW (ref 39–50)
HGB BLD-MCNC: 8.9 G/DL — LOW (ref 13–17)
IANC: 8.8 K/UL — HIGH (ref 1.8–7.4)
IMM GRANULOCYTES NFR BLD AUTO: 1.1 % — HIGH (ref 0–0.9)
LYMPHOCYTES # BLD AUTO: 1.05 K/UL — SIGNIFICANT CHANGE UP (ref 1–3.3)
LYMPHOCYTES # BLD AUTO: 9.6 % — LOW (ref 13–44)
MAGNESIUM SERPL-MCNC: 2.8 MG/DL — HIGH (ref 1.6–2.6)
MCHC RBC-ENTMCNC: 24.9 PG — LOW (ref 27–34)
MCHC RBC-ENTMCNC: 31 G/DL — LOW (ref 32–36)
MCV RBC AUTO: 80.2 FL — SIGNIFICANT CHANGE UP (ref 80–100)
MONOCYTES # BLD AUTO: 0.73 K/UL — SIGNIFICANT CHANGE UP (ref 0–0.9)
MONOCYTES NFR BLD AUTO: 6.7 % — SIGNIFICANT CHANGE UP (ref 2–14)
NEUTROPHILS # BLD AUTO: 8.8 K/UL — HIGH (ref 1.8–7.4)
NEUTROPHILS NFR BLD AUTO: 80.7 % — HIGH (ref 43–77)
NRBC # BLD AUTO: 0.11 K/UL — HIGH (ref 0–0)
NRBC # FLD: 0.11 K/UL — HIGH (ref 0–0)
NRBC BLD AUTO-RTO: 1 /100 WBCS — HIGH (ref 0–0)
PHOSPHATE SERPL-MCNC: 3.3 MG/DL — SIGNIFICANT CHANGE UP (ref 2.5–4.5)
PLATELET # BLD AUTO: 271 K/UL — SIGNIFICANT CHANGE UP (ref 150–400)
POTASSIUM SERPL-SCNC: 4.5 MMOL/L — SIGNIFICANT CHANGE UP (ref 3.5–5.3)
RBC # BLD: 3.58 M/UL — LOW (ref 4.2–5.8)
SODIUM SERPL-SCNC: 130 MMOL/L — LOW (ref 135–145)
SPECIMEN SOURCE: SIGNIFICANT CHANGE UP
SPECIMEN SOURCE: SIGNIFICANT CHANGE UP
VANCOMYCIN FLD-MCNC: 14.6 UG/ML — SIGNIFICANT CHANGE UP
WBC # BLD: 10.9 K/UL — HIGH (ref 3.8–10.5)
WBC # FLD AUTO: 10.9 K/UL — HIGH (ref 3.8–10.5)

## 2025-03-17 PROCEDURE — 94003 VENT MGMT INPAT SUBQ DAY: CPT

## 2025-03-17 PROCEDURE — 99233 SBSQ HOSP IP/OBS HIGH 50: CPT

## 2025-03-17 PROCEDURE — 99232 SBSQ HOSP IP/OBS MODERATE 35: CPT

## 2025-03-17 RX ORDER — PIPERACILLIN-TAZO-DEXTROSE,ISO 3.375G/5
3.38 IV SOLUTION, PIGGYBACK PREMIX FROZEN(ML) INTRAVENOUS
Refills: 0 | Status: COMPLETED | OUTPATIENT
Start: 2025-03-17 | End: 2025-03-18

## 2025-03-17 RX ORDER — VANCOMYCIN HCL IN 5 % DEXTROSE 1.5G/250ML
750 PLASTIC BAG, INJECTION (ML) INTRAVENOUS ONCE
Refills: 0 | Status: COMPLETED | OUTPATIENT
Start: 2025-03-17 | End: 2025-03-17

## 2025-03-17 RX ORDER — VANCOMYCIN HCL IN 5 % DEXTROSE 1.5G/250ML
1000 PLASTIC BAG, INJECTION (ML) INTRAVENOUS ONCE
Refills: 0 | Status: DISCONTINUED | OUTPATIENT
Start: 2025-03-17 | End: 2025-03-17

## 2025-03-17 RX ADMIN — Medication 25 GRAM(S): at 18:18

## 2025-03-17 RX ADMIN — INSULIN LISPRO 6 UNIT(S): 100 INJECTION, SOLUTION INTRAVENOUS; SUBCUTANEOUS at 12:45

## 2025-03-17 RX ADMIN — Medication 40 MILLIGRAM(S): at 11:00

## 2025-03-17 RX ADMIN — Medication 15 MILLILITER(S): at 17:08

## 2025-03-17 RX ADMIN — WHITE PETROLATUM 1 APPLICATION(S): 1 OINTMENT TOPICAL at 05:29

## 2025-03-17 RX ADMIN — Medication 75 MILLIGRAM(S): at 05:30

## 2025-03-17 RX ADMIN — Medication 250 MILLIGRAM(S): at 16:38

## 2025-03-17 RX ADMIN — WHITE PETROLATUM 1 APPLICATION(S): 1 OINTMENT TOPICAL at 17:12

## 2025-03-17 RX ADMIN — Medication 15 MILLILITER(S): at 05:25

## 2025-03-17 RX ADMIN — AMLODIPINE BESYLATE 10 MILLIGRAM(S): 10 TABLET ORAL at 05:29

## 2025-03-17 RX ADMIN — ATORVASTATIN CALCIUM 20 MILLIGRAM(S): 80 TABLET, FILM COATED ORAL at 21:49

## 2025-03-17 RX ADMIN — Medication 75 MILLIGRAM(S): at 13:02

## 2025-03-17 RX ADMIN — Medication 2.5 MILLIGRAM(S): at 14:25

## 2025-03-17 RX ADMIN — INSULIN LISPRO 6 UNIT(S): 100 INJECTION, SOLUTION INTRAVENOUS; SUBCUTANEOUS at 23:48

## 2025-03-17 RX ADMIN — INSULIN LISPRO 1: 100 INJECTION, SOLUTION INTRAVENOUS; SUBCUTANEOUS at 23:50

## 2025-03-17 RX ADMIN — HEPARIN SODIUM 5000 UNIT(S): 1000 INJECTION INTRAVENOUS; SUBCUTANEOUS at 17:08

## 2025-03-17 RX ADMIN — Medication 2.5 MILLIGRAM(S): at 03:38

## 2025-03-17 RX ADMIN — LABETALOL HYDROCHLORIDE 100 MILLIGRAM(S): 200 TABLET, FILM COATED ORAL at 05:30

## 2025-03-17 RX ADMIN — Medication 2.5 MILLIGRAM(S): at 08:54

## 2025-03-17 RX ADMIN — INSULIN LISPRO 2: 100 INJECTION, SOLUTION INTRAVENOUS; SUBCUTANEOUS at 17:09

## 2025-03-17 RX ADMIN — INSULIN LISPRO 1: 100 INJECTION, SOLUTION INTRAVENOUS; SUBCUTANEOUS at 05:27

## 2025-03-17 RX ADMIN — EPOETIN ALFA 10000 UNIT(S): 10000 SOLUTION INTRAVENOUS; SUBCUTANEOUS at 09:36

## 2025-03-17 RX ADMIN — Medication 25 GRAM(S): at 05:30

## 2025-03-17 RX ADMIN — Medication 1 TABLET(S): at 12:47

## 2025-03-17 RX ADMIN — Medication 75 MILLIGRAM(S): at 21:48

## 2025-03-17 RX ADMIN — Medication 2.5 MILLIGRAM(S): at 20:51

## 2025-03-17 RX ADMIN — CASPOFUNGIN ACETATE 260 MILLIGRAM(S): 5 INJECTION, POWDER, LYOPHILIZED, FOR SOLUTION INTRAVENOUS at 11:00

## 2025-03-17 RX ADMIN — LABETALOL HYDROCHLORIDE 100 MILLIGRAM(S): 200 TABLET, FILM COATED ORAL at 17:08

## 2025-03-17 RX ADMIN — HEPARIN SODIUM 5000 UNIT(S): 1000 INJECTION INTRAVENOUS; SUBCUTANEOUS at 05:29

## 2025-03-17 RX ADMIN — Medication 300 MILLIGRAM(S): at 12:46

## 2025-03-17 RX ADMIN — Medication 1 APPLICATION(S): at 12:46

## 2025-03-17 RX ADMIN — INSULIN GLARGINE-YFGN 12 UNIT(S): 100 INJECTION, SOLUTION SUBCUTANEOUS at 23:47

## 2025-03-17 RX ADMIN — INSULIN LISPRO 6 UNIT(S): 100 INJECTION, SOLUTION INTRAVENOUS; SUBCUTANEOUS at 05:28

## 2025-03-17 RX ADMIN — INSULIN LISPRO 6 UNIT(S): 100 INJECTION, SOLUTION INTRAVENOUS; SUBCUTANEOUS at 17:09

## 2025-03-17 NOTE — PROGRESS NOTE ADULT - ASSESSMENT
74-year-old male hx trach/vent - , CVA, COPD, stage renal disease on dialysis 4 times a week ([facility]) history of pressure ulcer.  Patient presents the ED today for elevated white count. nephrology consulted for dialysis needs    ESRD:  from [facility]  On HD MTTsF via an A-V fistula. s/p fistulogram by vascular 2/18  Continue MWF in hospital  Prolonged bleeding post HD -- vascular consulted. no plan for intervention.  s/p hd 3/12 uf 2L.  S/p HD on 03/14tolerated UF of 2.5L.  Received 1unit PRBC on 0/14  S/p PUF on 03/15  2 L  hd today  consent from wife in dialysis unit.  monitor bmp    Hypertension  BP fluctuating; acceptable.  continue norvasc 10 daily and hydralazine increased to 50 tid 3/2  C/W hydralazine 75mg TID.  max uf as tolerated  monitor BP    Anemia:  s/p 1 unit PRBC 2/23  epo with hd  iron sat >20  Given 1 unit of PRBC again today.  monitor Hgb  Transfuse for Hg < 7.    leukocytosis  sepsis work up per team  On Zosyn and now vancomycin.  Monitor trough level.    hyponatremia  in setting of esrd and hyperglycemia  optimize dm control  hd per schedule with UF  monitor Na    Hypokalemia  better  Monitor K.    Hypermagnesemia  HD per schedule.  Monitor Mg.    CKD - MBD:  PTH 25 - low for CKD stage.  Received PhosNaK x1 on 3/8.  PO4 improved.  Monitor PO4 and Ca daily.

## 2025-03-17 NOTE — PROGRESS NOTE ADULT - SUBJECTIVE AND OBJECTIVE BOX
RCU PROGRESS NOTE     CHIEF COMPLAINT: Patient is a 74y old  Male who presents with a chief complaint of 2/18/25 was in cath lab earlier today with vascular surgery for fistulogram and noted to have a WBC of 18 and sent to ED and admitted (04 Mar 2025 12:22)      INTERVAL EVENTS:  - NO interval events overnight  - Vanco dosed today   - HD today   - Zosyn day 6 today   - Vanco and caspo day 5 from clearance today     REVIEW OF SYSTEMS: Seen by bedside during AM rounds and unable to assess ROS as vented     Mode: AC/ CMV (Assist Control/ Continuous Mandatory Ventilation), RR (machine): 15, TV (machine): 400, FiO2: 40, PEEP: 6, ITime: 0.8, MAP: 9, PIP: 19      OBJECTIVE:  ICU Vital Signs Last 24 Hrs  T(C): 36.6 (16 Mar 2025 04:00), Max: 36.8 (15 Mar 2025 12:00)  T(F): 97.8 (16 Mar 2025 04:00), Max: 98.3 (15 Mar 2025 12:00)  HR: 69 (16 Mar 2025 04:00) (62 - 70)  BP: 129/51 (16 Mar 2025 04:00) (107/47 - 136/52)  BP(mean): 74 (16 Mar 2025 04:00) (64 - 75)  ABP: --  ABP(mean): --  RR: 16 (16 Mar 2025 04:00) (16 - 17)  SpO2: 100% (16 Mar 2025 04:00) (98% - 100%)    O2 Parameters below as of 16 Mar 2025 08:32  Patient On (Oxygen Delivery Method): ventilator          Mode: AC/ CMV (Assist Control/ Continuous Mandatory Ventilation), RR (machine): 15, TV (machine): 400, FiO2: 40, PEEP: 6, ITime: 0.8, MAP: 9, PIP: 19    03-15 @ 07:01  -  03-16 @ 07:00  --------------------------------------------------------  IN: 1660 mL / OUT: 2400 mL / NET: -740 mL      CAPILLARY BLOOD GLUCOSE  POCT Blood Glucose.: 183 mg/dL (16 Mar 2025 04:59)      HOSPITAL MEDICATIONS:  MEDICATIONS  (STANDING):  albuterol    0.083% 2.5 milliGRAM(s) Nebulizer every 6 hours  amLODIPine   Tablet 10 milliGRAM(s) Oral daily  AQUAPHOR (petrolatum Ointment) 1 Application(s) Topical two times a day  atorvastatin 20 milliGRAM(s) Oral at bedtime  caspofungin IVPB 50 milliGRAM(s) IV Intermittent every 24 hours  caspofungin IVPB      chlorhexidine 0.12% Liquid 15 milliLiter(s) Oral Mucosa every 12 hours  chlorhexidine 2% Cloths 1 Application(s) Topical daily  dextrose 5%. 1000 milliLiter(s) (100 mL/Hr) IV Continuous <Continuous>  dextrose 5%. 1000 milliLiter(s) (50 mL/Hr) IV Continuous <Continuous>  dextrose 50% Injectable 25 Gram(s) IV Push once  dextrose 50% Injectable 12.5 Gram(s) IV Push once  dextrose 50% Injectable 25 Gram(s) IV Push once  epoetin reilly-epbx (RETACRIT) Injectable 42209 Unit(s) IV Push <User Schedule>  ferrous    sulfate Liquid 300 milliGRAM(s) Enteral Tube daily  glucagon  Injectable 1 milliGRAM(s) IntraMuscular once  heparin   Injectable 5000 Unit(s) SubCutaneous every 12 hours  hydrALAZINE 75 milliGRAM(s) Oral three times a day  insulin glargine Injectable (LANTUS) 12 Unit(s) SubCutaneous at bedtime  insulin lispro (ADMELOG) corrective regimen sliding scale   SubCutaneous every 6 hours  insulin lispro Injectable (ADMELOG) 6 Unit(s) SubCutaneous every 6 hours  labetalol 100 milliGRAM(s) Oral two times a day  Nephro-noam 1 Tablet(s) Oral daily  pantoprazole   Suspension 40 milliGRAM(s) Oral before breakfast  piperacillin/tazobactam IVPB.. 3.375 Gram(s) IV Intermittent every 12 hours  sodium chloride 0.9% for Nebulization 3 milliLiter(s) Nebulizer every 6 hours    MEDICATIONS  (PRN):  dextrose Oral Gel 15 Gram(s) Oral once PRN Blood Glucose LESS THAN 70 milliGRAM(s)/deciliter  sodium chloride 0.9% Bolus. 100 milliLiter(s) IV Bolus every 5 minutes PRN SBP LESS THAN or EQUAL to 80 mmHg      PHYSICAL EXAMINATION  General: NAD   HEENT: Trach present  Cards: S1/S2, no murmurs   Pulm: Course vent sounds bilaterally and slightly decreased in LLL. No wheezes.   Abdomen: Soft, nondistended and nontender. PEG (+)   Extremities: No pedal edema. THAI of left upper and lower extremities noted and right hemiparesis per baseline with CVA hx.  Neurology: Awakens with eyes open and follows commands, but quickly closes eyes and nods yes and no to simple questions with no acute focal neurological deficits     LABS:                        9.0    13.13 )-----------( 314      ( 16 Mar 2025 08:15 )             29.5     03-16    130[L]  |  91[L]  |  53[H]  ----------------------------<  125[H]  4.9   |  24  |  3.12[H]    Ca    9.5      16 Mar 2025 08:15  Phos  2.7     03-16  Mg     2.60     03-16        Urinalysis Basic - ( 16 Mar 2025 08:15 )  Color: x / Appearance: x / SG: x / pH: x  Gluc: 125 mg/dL / Ketone: x  / Bili: x / Urobili: x   Blood: x / Protein: x / Nitrite: x   Leuk Esterase: x / RBC: x / WBC x   Sq Epi: x / Non Sq Epi: x / Bacteria: x            PAST MEDICAL & SURGICAL HISTORY:  ESRD on hemodialysis      HTN (hypertension)      Insulin dependent type 2 diabetes mellitus      History of cerebrovascular accident (CVA) with residual deficit      History of DVT of lower extremity      HLD (hyperlipidemia)      CVA (cerebrovascular accident)      Aphasia      Tracheostomy in place      PEG (percutaneous endoscopic gastrostomy) status      GERD (gastroesophageal reflux disease)      Constipation      Pressure ulcer      Arteriovenous fistula      S/P percutaneous endoscopic gastrostomy (PEG) tube placement      History of tracheostomy          FAMILY HISTORY:  FHx: diabetes mellitus (Father, Aunt)        Social History:      RADIOLOGY:  [ ] Reviewed and interpreted by me    PULMONARY FUNCTION TESTS:    EKG: RCU PROGRESS NOTE     CHIEF COMPLAINT: Patient is a 74y old  Male who presents with a chief complaint of 2/18/25 was in cath lab earlier today with vascular surgery for fistulogram and noted to have a WBC of 18 and sent to ED and admitted (04 Mar 2025 12:22)      INTERVAL EVENTS:  - NO interval events overnight  - Vanco dosed today   - HD today   - Zosyn day 6 today and plan for completion tomorrow  - Vanco and caspo day 5 from clearance today and plan for 2 week course through 3/26     REVIEW OF SYSTEMS: Seen by bedside during AM rounds and unable to assess ROS as vented     Mode: AC/ CMV (Assist Control/ Continuous Mandatory Ventilation), RR (machine): 15, TV (machine): 400, FiO2: 40, PEEP: 6, ITime: 0.8, MAP: 9, PIP: 19      OBJECTIVE:  ICU Vital Signs Last 24 Hrs  T(C): 36.6 (16 Mar 2025 04:00), Max: 36.8 (15 Mar 2025 12:00)  T(F): 97.8 (16 Mar 2025 04:00), Max: 98.3 (15 Mar 2025 12:00)  HR: 69 (16 Mar 2025 04:00) (62 - 70)  BP: 129/51 (16 Mar 2025 04:00) (107/47 - 136/52)  BP(mean): 74 (16 Mar 2025 04:00) (64 - 75)  ABP: --  ABP(mean): --  RR: 16 (16 Mar 2025 04:00) (16 - 17)  SpO2: 100% (16 Mar 2025 04:00) (98% - 100%)    O2 Parameters below as of 16 Mar 2025 08:32  Patient On (Oxygen Delivery Method): ventilator          Mode: AC/ CMV (Assist Control/ Continuous Mandatory Ventilation), RR (machine): 15, TV (machine): 400, FiO2: 40, PEEP: 6, ITime: 0.8, MAP: 9, PIP: 19    03-15 @ 07:01  -  03-16 @ 07:00  --------------------------------------------------------  IN: 1660 mL / OUT: 2400 mL / NET: -740 mL      CAPILLARY BLOOD GLUCOSE  POCT Blood Glucose.: 183 mg/dL (16 Mar 2025 04:59)      HOSPITAL MEDICATIONS:  MEDICATIONS  (STANDING):  albuterol    0.083% 2.5 milliGRAM(s) Nebulizer every 6 hours  amLODIPine   Tablet 10 milliGRAM(s) Oral daily  AQUAPHOR (petrolatum Ointment) 1 Application(s) Topical two times a day  atorvastatin 20 milliGRAM(s) Oral at bedtime  caspofungin IVPB 50 milliGRAM(s) IV Intermittent every 24 hours  caspofungin IVPB      chlorhexidine 0.12% Liquid 15 milliLiter(s) Oral Mucosa every 12 hours  chlorhexidine 2% Cloths 1 Application(s) Topical daily  dextrose 5%. 1000 milliLiter(s) (100 mL/Hr) IV Continuous <Continuous>  dextrose 5%. 1000 milliLiter(s) (50 mL/Hr) IV Continuous <Continuous>  dextrose 50% Injectable 25 Gram(s) IV Push once  dextrose 50% Injectable 12.5 Gram(s) IV Push once  dextrose 50% Injectable 25 Gram(s) IV Push once  epoetin reilly-epbx (RETACRIT) Injectable 83344 Unit(s) IV Push <User Schedule>  ferrous    sulfate Liquid 300 milliGRAM(s) Enteral Tube daily  glucagon  Injectable 1 milliGRAM(s) IntraMuscular once  heparin   Injectable 5000 Unit(s) SubCutaneous every 12 hours  hydrALAZINE 75 milliGRAM(s) Oral three times a day  insulin glargine Injectable (LANTUS) 12 Unit(s) SubCutaneous at bedtime  insulin lispro (ADMELOG) corrective regimen sliding scale   SubCutaneous every 6 hours  insulin lispro Injectable (ADMELOG) 6 Unit(s) SubCutaneous every 6 hours  labetalol 100 milliGRAM(s) Oral two times a day  Nephro-noam 1 Tablet(s) Oral daily  pantoprazole   Suspension 40 milliGRAM(s) Oral before breakfast  piperacillin/tazobactam IVPB.. 3.375 Gram(s) IV Intermittent every 12 hours  sodium chloride 0.9% for Nebulization 3 milliLiter(s) Nebulizer every 6 hours    MEDICATIONS  (PRN):  dextrose Oral Gel 15 Gram(s) Oral once PRN Blood Glucose LESS THAN 70 milliGRAM(s)/deciliter  sodium chloride 0.9% Bolus. 100 milliLiter(s) IV Bolus every 5 minutes PRN SBP LESS THAN or EQUAL to 80 mmHg      PHYSICAL EXAMINATION  General: NAD   HEENT: Trach present  Cards: S1/S2, no murmurs   Pulm: Course vent sounds bilaterally and slightly decreased in LLL. No wheezes.   Abdomen: Soft, nondistended and nontender. PEG (+)   Extremities: No pedal edema. THAI of left upper and lower extremities noted and right hemiparesis per baseline with CVA hx.  Neurology: Awakens with eyes open and follows commands, but quickly closes eyes and nods yes and no to simple questions with no acute focal neurological deficits     LABS:                                             8.9    10.90 )-----------( 271      ( 17 Mar 2025 05:11 )             28.7       03-17    130[L]  |  89[L]  |  67[H]  ----------------------------<  151[H]  4.5   |  22  |  3.72[H]    Ca    9.6      17 Mar 2025 05:11  Phos  3.3     03-17  Mg     2.80     03-17          Urinalysis Basic - ( 16 Mar 2025 08:15 )  Color: x / Appearance: x / SG: x / pH: x  Gluc: 125 mg/dL / Ketone: x  / Bili: x / Urobili: x   Blood: x / Protein: x / Nitrite: x   Leuk Esterase: x / RBC: x / WBC x   Sq Epi: x / Non Sq Epi: x / Bacteria: x            PAST MEDICAL & SURGICAL HISTORY:  ESRD on hemodialysis      HTN (hypertension)      Insulin dependent type 2 diabetes mellitus      History of cerebrovascular accident (CVA) with residual deficit      History of DVT of lower extremity      HLD (hyperlipidemia)      CVA (cerebrovascular accident)      Aphasia      Tracheostomy in place      PEG (percutaneous endoscopic gastrostomy) status      GERD (gastroesophageal reflux disease)      Constipation      Pressure ulcer      Arteriovenous fistula      S/P percutaneous endoscopic gastrostomy (PEG) tube placement      History of tracheostomy          FAMILY HISTORY:  FHx: diabetes mellitus (Father, Aunt)        Social History:      RADIOLOGY:  [ ] Reviewed and interpreted by me    PULMONARY FUNCTION TESTS:    EKG:

## 2025-03-17 NOTE — PROGRESS NOTE ADULT - NS ATTEND AMEND GEN_ALL_CORE FT
74 y.o. male with ESRD on HD via AVF, HTN, DMII, and recent prolonged hospitalization (4-6/2024) for baclofen toxicity and acute ischemic CVA of the left talya/cerebellum c/b acute hypoxemic and hypercapnic respiratory failure s/p ETT intubation/MV with failure to wean from ventilator s/p tracheostomy with hospital course further complicated by aspiration and B cereus bacteremia and RLE DVT followed by citrobacter PNA, GIB i/s/o AOCD, and fistula stenosis s/p fistulogram 6/4 ultimately transferred to acute rehab, decannulated, and discharged home until 10/2024 when pt returned to OSH with aspiration PNA c/b hypoxemic respiratory failure requiring ETT intubation/MV followed by tracheostomy placement and d/c to LTCF ([facility]) 11/2024 now presenting to [facility] on 2/18/25 for RUE fistulogram/angioplasty with vascular sx but with hospital course c/b concern fo recurrent PNA and uncontrolled hyperglycemia admitted to RCU for further medical management.    Per vascular no further intervention, no need to be on full a/c 2/2 to fistula bleeding. Not tolerating full a/c.   Completed course of abx but now with C. auris fungemia (3/12), MRSE bacteremia and enterococcus bacteremia. Repeat Blood cultures 3/13 NGTD. CT with dense consolidation.   Repeat Wound care eval. TTE without signs of endocarditis. Ophtho eval - no e/o endophthalmitis       Plan     - CVA with recent strokes, c/w statins, unable to tolerate full a/c  - c/w vent, C/W albuterol and NS nebs. PS as tolerated.   - C/W peg tube feeds  - Bleeding AV fistula, per vascular no acute interventions are planned, Recommending against full a/c 2/2 to bleeding.   - Wound care for pressure ulcers  - Per vascular DVT likely chronic  - HD per renal  - Fevers, s/p cultures now with fungemia, bacteremia. Repeat TTE without signs of endocarditis. Repeat blood cultures 3/13 NGTD. c/w caspo and Zosyn. F/U further ID recs. Monitor levels.   - DVT ppx- SQH  - Dispo- Full code. Prognosis overall is poor.

## 2025-03-17 NOTE — PROGRESS NOTE ADULT - SUBJECTIVE AND OBJECTIVE BOX
Chief Complaint: DM type 2     Interval Events: FS stable. Tube feeding ongoing.     MEDICATIONS  (STANDING):  albuterol    0.083% 2.5 milliGRAM(s) Nebulizer every 6 hours  amLODIPine   Tablet 10 milliGRAM(s) Oral daily  AQUAPHOR (petrolatum Ointment) 1 Application(s) Topical two times a day  atorvastatin 20 milliGRAM(s) Oral at bedtime  caspofungin IVPB 50 milliGRAM(s) IV Intermittent every 24 hours  caspofungin IVPB      chlorhexidine 0.12% Liquid 15 milliLiter(s) Oral Mucosa every 12 hours  chlorhexidine 2% Cloths 1 Application(s) Topical daily  dextrose 5%. 1000 milliLiter(s) (100 mL/Hr) IV Continuous <Continuous>  dextrose 5%. 1000 milliLiter(s) (50 mL/Hr) IV Continuous <Continuous>  dextrose 50% Injectable 25 Gram(s) IV Push once  dextrose 50% Injectable 12.5 Gram(s) IV Push once  dextrose 50% Injectable 25 Gram(s) IV Push once  epoetin reilly-epbx (RETACRIT) Injectable 37811 Unit(s) IV Push <User Schedule>  ferrous    sulfate Liquid 300 milliGRAM(s) Enteral Tube daily  glucagon  Injectable 1 milliGRAM(s) IntraMuscular once  heparin   Injectable 5000 Unit(s) SubCutaneous every 12 hours  hydrALAZINE 75 milliGRAM(s) Oral three times a day  insulin glargine Injectable (LANTUS) 12 Unit(s) SubCutaneous at bedtime  insulin lispro (ADMELOG) corrective regimen sliding scale   SubCutaneous every 6 hours  insulin lispro Injectable (ADMELOG) 6 Unit(s) SubCutaneous every 6 hours  labetalol 100 milliGRAM(s) Oral two times a day  Nephro-noam 1 Tablet(s) Oral daily  pantoprazole   Suspension 40 milliGRAM(s) Oral before breakfast  sodium chloride 0.9% for Nebulization 3 milliLiter(s) Nebulizer every 6 hours  vancomycin  IVPB 750 milliGRAM(s) IV Intermittent once    MEDICATIONS  (PRN):  dextrose Oral Gel 15 Gram(s) Oral once PRN Blood Glucose LESS THAN 70 milliGRAM(s)/deciliter  sodium chloride 0.9% Bolus. 100 milliLiter(s) IV Bolus every 5 minutes PRN SBP LESS THAN or EQUAL to 80 mmHg      Allergies    No Known Allergies    Intolerances      Review of Systems:    UNABLE TO OBTAIN    PHYSICAL EXAM:  VITALS: T(C): 36.4 (03-17-25 @ 12:15)  T(F): 97.5 (03-17-25 @ 12:15), Max: 97.8 (03-17-25 @ 00:00)  HR: 61 (03-17-25 @ 12:15) (58 - 88)  BP: 131/60 (03-17-25 @ 12:15) (124/54 - 155/63)  RR:  (15 - 18)  SpO2:  (96% - 100%)  Wt(kg): --  GENERAL: NAD  RESPIRATORY: trache to vent   GI: Soft, non distended  NEURO: A&Ox0       CAPILLARY BLOOD GLUCOSE      POCT Blood Glucose.: 118 mg/dL (17 Mar 2025 11:50)  POCT Blood Glucose.: 193 mg/dL (17 Mar 2025 04:52)  POCT Blood Glucose.: 221 mg/dL (16 Mar 2025 23:28)  POCT Blood Glucose.: 217 mg/dL (16 Mar 2025 16:35)      03-17    130[L]  |  89[L]  |  67[H]  ----------------------------<  151[H]  4.5   |  22  |  3.72[H]    eGFR: 17[L]    Ca    9.6      03-17  Mg     2.80     03-17  Phos  3.3     03-17            Thyroid Function Tests:        A1C with Estimated Average Glucose Result: 6.4 % (02-19-25 @ 06:45)  A1C with Estimated Average Glucose Result: 4.8 % (10-05-24 @ 08:37)  A1C with Estimated Average Glucose Result: 5.9 % (07-16-24 @ 10:25)  A1C with Estimated Average Glucose Result: 6.9 % (04-30-24 @ 06:03)          Diet, NPO with Tube Feed:   Tube Feeding Modality: Gastrostomy  Nepro with Carb Steady (NEPRORTH)  Total Volume for 24 Hours (mL): 1160  Bolus  Total Volume of Bolus (mL):  290  Total # of Feeds: 4  Tube Feed Frequency: Every 6 hours   Tube Feed Start Time: 12:00  Bolus Feed Rate (mL per Hour): 290   Bolus Feed Duration (in Hours): 0  Liquid Protein Supplement     Qty per Day:  2 (03-14-25 @ 14:18)

## 2025-03-17 NOTE — PROGRESS NOTE ADULT - SUBJECTIVE AND OBJECTIVE BOX
[facility]  AUGUST Carbajal Y. Patel, S. Shah, G. Casimir  [phone]  ([phone] - weekdays after 5pm and weekends)    Name: JIMMY BLAND  Age/Gender: 74y Male  MRN: [id number]    Interval History:  Notes reviewed.   No concerning overnight events.  Afebrile.     Allergies: No Known Allergies      Objective:  Vitals:   T(F): 97.5 (03-17-25 @ 09:00), Max: 97.8 (03-16-25 @ 12:00)  HR: 58 (03-17-25 @ 09:00) (58 - 88)  BP: 139/60 (03-17-25 @ 09:00) (122/95 - 155/63)  RR: 18 (03-17-25 @ 09:00) (15 - 18)  SpO2: 100% (03-17-25 @ 06:47) (96% - 100%)  Physical Examination:  General: frail appearing  HEENT: anicteric, tracheostomy, on vent  Cardio: S1, S2, normal rate  Resp: rhonchi  Abd: Soft. Nondistended. PEG  Ext: no LE edema  Skin: warm, dry    Laboratory Studies:  CBC:                       8.9    10.90 )-----------( 271      ( 17 Mar 2025 05:11 )             28.7     WBC Trend:  10.90 03-17-25 @ 05:11  13.13 03-16-25 @ 08:15  12.61 03-15-25 @ 07:14  16.47 03-14-25 @ 09:45  18.65 03-13-25 @ 06:30  18.73 03-12-25 @ 04:53  14.20 03-11-25 @ 05:50  23.01 03-10-25 @ 17:00    CMP: 03-17    130[L]  |  89[L]  |  67[H]  ----------------------------<  151[H]  4.5   |  22  |  3.72[H]    Ca    9.6      17 Mar 2025 05:11  Phos  3.3     03-17  Mg     2.80     03-17          Vancomycin Level, Random: 14.6 ug/mL (03-17-25 @ 05:11)    Urinalysis Basic - ( 17 Mar 2025 05:11 )    Color: x / Appearance: x / SG: x / pH: x  Gluc: 151 mg/dL / Ketone: x  / Bili: x / Urobili: x   Blood: x / Protein: x / Nitrite: x   Leuk Esterase: x / RBC: x / WBC x   Sq Epi: x / Non Sq Epi: x / Bacteria: x      Microbiology: reviewed     Culture - Sputum (collected 03-16-25 @ 15:45)  Source: Trach Asp Tracheal Aspirate  Gram Stain (03-17-25 @ 03:24):    Moderate polymorphonuclear leukocytes per low power field    Few Squamous epithelial cells per low power field    Few Squamous epithelial cells seen per oil power field    Moderate Gram Negative Rods seen per oil power field    Few Gram positive cocci in pairs seen per oil power field    Culture - Blood (collected 03-14-25 @ 10:15)  Source: Blood Dialysis Catheter  Preliminary Report (03-16-25 @ 17:01):    No growth at 48 Hours    Culture - Blood (collected 03-14-25 @ 09:45)  Source: Blood Dialysis Catheter  Preliminary Report (03-16-25 @ 17:00):    No growth at 48 Hours    Culture - Blood (collected 03-13-25 @ 08:51)  Source: Blood Blood-Peripheral  Preliminary Report (03-16-25 @ 13:01):    No growth at 72 Hours    Culture - Blood (collected 03-13-25 @ 08:30)  Source: Blood Blood-Peripheral  Preliminary Report (03-16-25 @ 13:01):    No growth at 72 Hours    Culture - Blood (collected 03-12-25 @ 04:52)  Source: Blood Blood-Venous  Gram Stain (03-13-25 @ 06:28):    Growth in anaerobic bottle: Gram Positive Cocci in Clusters and Gram    Positive Cocci in Pairs and Chains  Final Report (03-16-25 @ 11:55):    Growth in anaerobic bottle: Enterococcus faecium    Growth in anaerobic bottle: Staphylococcus epidermidis "Susceptibilities    not performed"    Growth in anaerobic bottle: Candida auris    Direct identification is available within approximately 3-5    hours either by Blood Panel Multiplexed PCR or Direct    MALDI-TOF. Details: [email or web address]  Organism: Blood Culture PCR  Enterococcus faecium  Candida auris (03-16-25 @ 11:55)  Organism: Candida auris (03-16-25 @ 11:55)      Method Type: YSTMIC      -  Anidulafungin: S 0.12 For this antifungal agent, the data are based largely on experience with non-neutropenic patients with candidemia.  The clinical relevance of this antifungal agent in other settings is uncertain.      -  Micafungin: S 0.12 For this antifungal agent, the data are based largely on experience with non-neutropenic patients with candidemia.  The clinical relevance of this antifungal agent in other settings is uncertain.      -  Voriconazole: N/I 1 For this antifungal agent, the data are based largely on experience with non-neutropenic patients with candidemia.  The clinical relevance of this antifungal agent in other settings is uncertain.      -  Posaconazole: N/I 0.12 For Candida species, no interpretation is available for any in vitro susceptibility testing.      -  Fluconazole: R >64 There are no CLSI or FDA breakpoints for Candida auris. The breakpoints provided for specific antifungal drugs are based on tentative CDC breakpoints and have been validated by [facility].  Fluconazole: Susceptibility depends on achieving the maximum possible blood level. Doses higher than the standard dosing amount (6mg/kg/day) may be needed in adults with normal renal function and body habitus.  This test was developed and its performance characteristics determined by [facility], [place], [place]  It has not been cleared or approved by the U.S. Food and Drug Administration. S - Susceptible; SDD - Susceptible Dose Dependent; I - Intermediate; R - Resistant; N/I - No Interpretation Available      -  Amphotericin B: N/I 2 For Candida species, no interpretation is available for any in vitro susceptibility testing.      -  Caspofungin: S 0.25 For this antifungal agent, the data are based largely on experience with non-neutropenic patients with candidemia.  The clinical relevance of this antifungal agent in other settings is uncertain.  Organism: Enterococcus faecium (03-16-25 @ 11:55)      Method Type: VIDA      -  Ampicillin: R >8 Predicts results to ampicillin/sulbactam, amoxacillin-clavulanate and  piperacillin-tazobactam.      -  Vancomycin: S 0.5      -  Gentamicin synergy: S <=500      -  Streptomycin synergy: S <=1000  Organism: Blood Culture PCR (03-16-25 @ 11:55)      Method Type: PCR      -  Enterococcus faecium: Detec      -  Staphylococcus epidermidis, Methicillin resistant: Detec      -  Candida auris: Detec    Culture - Blood (collected 03-12-25 @ 04:40)  Source: Blood Blood-Venous  Preliminary Report (03-16-25 @ 10:01):    No growth at 4 days    Culture - Blood (collected 03-09-25 @ 05:20)  Source: Blood Blood-Peripheral  Final Report (03-14-25 @ 09:01):    No growth at 5 days    Culture - Blood (collected 03-09-25 @ 05:05)  Source: Blood Blood-Venous  Final Report (03-14-25 @ 09:01):    No growth at 5 days    Culture - Blood (collected 03-03-25 @ 16:27)  Source: Blood Blood-Peripheral  Final Report (03-08-25 @ 22:01):    No growth at 5 days    Culture - Blood (collected 03-03-25 @ 16:23)  Source: Blood Blood-Venous  Final Report (03-08-25 @ 22:01):    No growth at 5 days    Culture - Sputum (collected 03-03-25 @ 14:10)  Source: Trach Asp Tracheal Aspirate  Gram Stain (03-04-25 @ 04:28):    Few polymorphonuclear leukocytes per low power field    No Squamous epithelial cells per low power field    Moderate Gram Negative Rods seen per oil power field  Final Report (03-05-25 @ 16:12):    Numerous Pseudomonas aeruginosa (Carbapenem Resistant)    Commensal dominick consistent with body site  Organism: Pseudomonas aeruginosa (Carbapenem Resistant) (03-05-25 @ 16:12)  Organism: Pseudomonas aeruginosa (Carbapenem Resistant) (03-05-25 @ 16:12)      Method Type: CarbaR      -  Resistance Gene to Carbapenem: Nondet  Organism: Pseudomonas aeruginosa (Carbapenem Resistant) (03-05-25 @ 16:12)      Method Type: VIDA      -  Aztreonam: S <=4      -  Cefepime: S <=2      -  Ceftazidime: S 4      -  Ceftazidime/Avibactam: S <=4      -  Ceftolozane/tazobactam: S <=2      -  Ciprofloxacin: S <=0.25      -  Imipenem: R 8      -  Levofloxacin: S <=0.5      -  Meropenem: R 8      -  Piperacillin/Tazobactam: S <=8        Radiology: reviewed     Medications:  albuterol    0.083% 2.5 milliGRAM(s) Nebulizer every 6 hours  amLODIPine   Tablet 10 milliGRAM(s) Oral daily  AQUAPHOR (petrolatum Ointment) 1 Application(s) Topical two times a day  atorvastatin 20 milliGRAM(s) Oral at bedtime  caspofungin IVPB 50 milliGRAM(s) IV Intermittent every 24 hours  caspofungin IVPB      chlorhexidine 0.12% Liquid 15 milliLiter(s) Oral Mucosa every 12 hours  chlorhexidine 2% Cloths 1 Application(s) Topical daily  dextrose 5%. 1000 milliLiter(s) IV Continuous <Continuous>  dextrose 5%. 1000 milliLiter(s) IV Continuous <Continuous>  dextrose 50% Injectable 25 Gram(s) IV Push once  dextrose 50% Injectable 12.5 Gram(s) IV Push once  dextrose 50% Injectable 25 Gram(s) IV Push once  dextrose Oral Gel 15 Gram(s) Oral once PRN  epoetin reilly-epbx (RETACRIT) Injectable 60609 Unit(s) IV Push <User Schedule>  ferrous    sulfate Liquid 300 milliGRAM(s) Enteral Tube daily  glucagon  Injectable 1 milliGRAM(s) IntraMuscular once  heparin   Injectable 5000 Unit(s) SubCutaneous every 12 hours  hydrALAZINE 75 milliGRAM(s) Oral three times a day  insulin glargine Injectable (LANTUS) 12 Unit(s) SubCutaneous at bedtime  insulin lispro (ADMELOG) corrective regimen sliding scale   SubCutaneous every 6 hours  insulin lispro Injectable (ADMELOG) 6 Unit(s) SubCutaneous every 6 hours  labetalol 100 milliGRAM(s) Oral two times a day  Nephro-noam 1 Tablet(s) Oral daily  pantoprazole   Suspension 40 milliGRAM(s) Oral before breakfast  piperacillin/tazobactam IVPB.. 3.375 Gram(s) IV Intermittent every 12 hours  sodium chloride 0.9% Bolus. 100 milliLiter(s) IV Bolus every 5 minutes PRN  sodium chloride 0.9% for Nebulization 3 milliLiter(s) Nebulizer every 6 hours  vancomycin  IVPB 1000 milliGRAM(s) IV Intermittent once    Antimicrobials:  caspofungin IVPB 50 milliGRAM(s) IV Intermittent every 24 hours  caspofungin IVPB      piperacillin/tazobactam IVPB.. 3.375 Gram(s) IV Intermittent every 12 hours  vancomycin  IVPB 1000 milliGRAM(s) IV Intermittent once

## 2025-03-17 NOTE — PROGRESS NOTE ADULT - SUBJECTIVE AND OBJECTIVE BOX
[facility]   MD ELIANE BHAGAT MD ANGELA WONG, PA    TEL:  OFFICE: [phone]  From 5pm-7am Answering Service [phone]    -- RENAL FOLLOW UP NOTE ---Date of Service 03-17-25 @ 11:00    Patient is a 74y old  Male who presents with a chief complaint of 2/18/25 was in cath lab earlier today with vascular surgery for fistulogram and noted to have a WBC of 18 and sent to ED and admitted (04 Mar 2025 12:22)      Patient seen and examined at bedside.     VITALS:  T(F): 97.5 (03-17-25 @ 09:00), Max: 97.8 (03-16-25 @ 12:00)  HR: 58 (03-17-25 @ 09:00)  BP: 139/60 (03-17-25 @ 09:00)  RR: 18 (03-17-25 @ 09:00)  SpO2: 100% (03-17-25 @ 08:54)  Wt(kg): --        PHYSICAL EXAM:  General: nonverbal  Neck: +trach  Respiratory: + rhonchi  Cardiovascular: S1, S2, RRR  Gastrointestinal: +peg  Extremities: No peripheral edema    Hospital Medications:   MEDICATIONS  (STANDING):  albuterol    0.083% 2.5 milliGRAM(s) Nebulizer every 6 hours  amLODIPine   Tablet 10 milliGRAM(s) Oral daily  AQUAPHOR (petrolatum Ointment) 1 Application(s) Topical two times a day  atorvastatin 20 milliGRAM(s) Oral at bedtime  caspofungin IVPB 50 milliGRAM(s) IV Intermittent every 24 hours  caspofungin IVPB      chlorhexidine 0.12% Liquid 15 milliLiter(s) Oral Mucosa every 12 hours  chlorhexidine 2% Cloths 1 Application(s) Topical daily  dextrose 5%. 1000 milliLiter(s) (100 mL/Hr) IV Continuous <Continuous>  dextrose 5%. 1000 milliLiter(s) (50 mL/Hr) IV Continuous <Continuous>  dextrose 50% Injectable 25 Gram(s) IV Push once  dextrose 50% Injectable 12.5 Gram(s) IV Push once  dextrose 50% Injectable 25 Gram(s) IV Push once  epoetin reilly-epbx (RETACRIT) Injectable 38150 Unit(s) IV Push <User Schedule>  ferrous    sulfate Liquid 300 milliGRAM(s) Enteral Tube daily  glucagon  Injectable 1 milliGRAM(s) IntraMuscular once  heparin   Injectable 5000 Unit(s) SubCutaneous every 12 hours  hydrALAZINE 75 milliGRAM(s) Oral three times a day  insulin glargine Injectable (LANTUS) 12 Unit(s) SubCutaneous at bedtime  insulin lispro (ADMELOG) corrective regimen sliding scale   SubCutaneous every 6 hours  insulin lispro Injectable (ADMELOG) 6 Unit(s) SubCutaneous every 6 hours  labetalol 100 milliGRAM(s) Oral two times a day  Nephro-noam 1 Tablet(s) Oral daily  pantoprazole   Suspension 40 milliGRAM(s) Oral before breakfast  piperacillin/tazobactam IVPB.. 3.375 Gram(s) IV Intermittent every 12 hours  sodium chloride 0.9% for Nebulization 3 milliLiter(s) Nebulizer every 6 hours  vancomycin  IVPB 750 milliGRAM(s) IV Intermittent once      LABS:  03-17    130[L]  |  89[L]  |  67[H]  ----------------------------<  151[H]  4.5   |  22  |  3.72[H]    Ca    9.6      17 Mar 2025 05:11  Phos  3.3     03-17  Mg     2.80     03-17      Creatinine Trend: 3.72 <--, 3.12 <--, 2.28 <--, 3.18 <--, 2.38 <--, 3.35 <--, 2.44 <--, 3.98 <--    Phosphorus: 3.3 mg/dL (03-17 @ 05:11)                              8.9    10.90 )-----------( 271      ( 17 Mar 2025 05:11 )             28.7     Urine Studies:  Urinalysis - [03-17-25 @ 05:11]      Color  / Appearance  / SG  / pH       Gluc 151 / Ketone   / Bili  / Urobili        Blood  / Protein  / Leuk Est  / Nitrite       RBC  / WBC  / Hyaline  / Gran  / Sq Epi  / Non Sq Epi  / Bacteria       Iron 46, TIBC 142, %sat 32      [02-19-25 @ 11:39]  Ferritin 3601      [02-19-25 @ 11:39]  PTH -- (Ca --)      [03-09-25 @ 05:20]   25  PTH -- (Ca --)      [05-26-24 @ 01:20]   122  TSH 1.660      [10-05-24 @ 08:37]  Lipid: chol --, , HDL --, LDL --      [10-18-24 @ 06:00]    HBsAb 654.8      [02-19-25 @ 19:05]  HBsAg Nonreact      [02-19-25 @ 19:05]  HBcAb Nonreact      [02-19-25 @ 19:05]  HCV 0.19, Nonreact      [02-19-25 @ 19:05]      RADIOLOGY & ADDITIONAL STUDIES:

## 2025-03-17 NOTE — PROGRESS NOTE ADULT - ASSESSMENT
73 yo M with hx trach/vent (last changed 10/23/24), CVA x2 with residual R hemiplegia, COPD, ESRD on HD MWF (last 2/17), pressure ulcer presenting to [facility] ED for leukocytosis. Patient was initially up in the cath lab earlier today with vascular surgery for fistulogram and noted to have a WBC of 18 and sent to ED. Patient nonverbal at baseline, history provided by son. Per wife, patient at normal baseline, somewhat able to make needs known and is not complaining of any pain. Endocrinology was consulted for management of diabetes mellitus type 2 - patient on TF with hyperglycemia.     Type 2 Diabetes Mellitus  - HbA1c 6.4   - home regimen: on basal/bolus and on TF outpatient - as per wife, he was on humalog 2 units TID and flextouch pen (tresiba?) 6 units at bedtime   - from [facility] - please confirm what [facility] has been doing with regards to pt insulin regimen.      Inpatient plan   - FS goal 100-180 mg/dl: FS stable.   - On bolus tube feeds 290 mL every 6 hours  - continue Lantus 12 units at bedtime  - Continue Admelog 6 units every 6 hours.  Hold if tube feeds are on hold.  -Please change abx to be in NS instead of dextrose.   - continue low Admelog correctional scale q6  - FS q6  - Please update endocrine if any changes to tube feeding    Discharge plan  - If on bolus feeds on discharge, continue basal/bolus regimen, doses TBD.      Hypertension  - BP goal <130/80  - continue norvasc, labetalol and hydralazine  - Management as per primary team    Hyperlipidemia  - LDL goal <70   - continue statin   - management per primary team     EMILY Greene-BC  Nurse Practitioner  Division of Endocrinology & Diabetes  Available via Microsoft Teams

## 2025-03-17 NOTE — PROGRESS NOTE ADULT - ASSESSMENT
75 y/o M PMhx trach/vent (last changed 10/23/24), CVA x2 with residual R hemiplegia, COPD, ESRD on HD MWF who presented to ED for leukocytosis when initially planned for fistulogram and noted to have a WBC of 18.   he was treated for E faecium UTI, and  pseudomonas VAP s/p cefepime, completed 2/25  he developed fever and was started on zosyn  bronch cx grew pseudomonas    E faecium bacteremia, candidemia  blood cx from 3/12- cx w/ candida auris and E faecium, MRSE  - E faecium vanc sensitive  - candida auris- caspofungin sensitive  CT C/A/P- Multifocal pneumonia. Fluid and debris in the trachea and bronchi, left greater than right. Aspiration is a consideration. Infiltration of the soft tissues posterior to the bilateral ischia and right greater trochanter, without evidence of underlying drainable fluid collection.  TTE without evidence of vegetations  s/p optho eval- no evidence of endophthalmitis    pseudomonas PNA- treated  trach aspirate  pseudomonas  s/p cefepime x 7 days, completed 2/25  s/p zosyn x 7 days, completed 3/10    DVT  RUE US- age-indeterminate, non-occlusive deep vein thrombosis noted in the right brachial vein  on eliquis    ESRD  HD per nephrology    leukocytosis improving  repeat blood cultures- NGTD  vanc level 14.6 today  Recommendations  c/w zosyn w/ last day 3/18  c/w caspofungin  give vanc 750mg after HD today and check level prior to dose on 3/19  plan for 2 week course of vanc and caspofungin w/ last day 3/27  aspiration precautions  contact isolation for candida auris    Steven Torres M.D.  [facility]  [phone]  After 5pm on weekdays and all day on weekends - please call [phone]  Available on microsoft teams    Thank you for consulting us and involving us in the management of this patients case. In addition to reviewing history, imaging, documents, labs, microbiology, took into account antibiotic stewardship, local antibiogram and infection control strategies and potential transmission issues.

## 2025-03-17 NOTE — PROGRESS NOTE ADULT - ASSESSMENT
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 Patent recently admitted in 6/2024 for left pontine and cerebellar CVA requiring tracheostomy. While at [facility] patient decannulated and passed SS with advanced diet to easy to chew with thin liquids, but complicated COVID requiring transfer to [facility] in 7/2024 and then ultimately discharged home. Per wife patient was doing well at home until 10/2024 when he was found with RLL with concern for aspiration requiring intubation, then tracheostomy, and ultimately discharged to NH with vent dependence in 11/2024. Patient now presented for RUE fistulogram and angioplasty with vascular, however course complicated by leukocytosis with concern for recurrent trachitis vs PNA and UTI, AOCD and hyperglycemia. Admitted to RCU for further management. Found with  PSA trachitis/ PNA and enterococcus faecium UTI. Course complicated by RUE brachial DVT and hypoxia with HD with concern for volume down vs PE? Volume removal held and hypoxia improved. AC given empirically and CTA CHEST with no PE. Case discussed with vascular and since brachial DVT appears old no need for chronic AC . Course further complicated by PSA LLL PNA and completed ABX followed by low grade fever and worsening leukocytosis. Found with MRSE, Candida auris and E. Faecium bacteremia     NEUROLOGY  # Poor mentation second to metabolic encephalopathy vs psychosomatic/ depression   - Hx of L cerebellar and pontene embolic CVA x 2 with residual R hemiplegia   - AOx0, bedbound, and nonverbal at baseline per report, however per exam patient able to open eyes, able to follow commands on left (LUE>LLE) with SEVERE weakness, however noted with R hemiplegia per baseline  - Nods yes and no occasionally however keeps eyes closed likely psychosomatic vs metabolic encephalopathy with infections?   - Last CTH in 10/2024 with no acute findings   - Hold [facility] CTH for now   - Monitor mentation     CARDIOVASCULAR  # Hx of HTN and HLD  - Continue on hydral 75 (increased 3/13), norvasc 10, and labetalol 100 mg BID   - Monitor BP     # Incidental Pericardial effusion   - Incidental small pericardial effusion seen adjacent to right ventricle, however IVC appears flat and LE without edema with low concern for RV failure.   - Prior TTE reviewed from 4/2024 with normal RVLVSF with no pericardial effusion noted at the time.   - TTE 2/23 with EF 65, normal LVRVSF, mild LAD, normal RA, mild pulmonary hypertension, and small pericardial effusion noted adjacent to the anterior right ventricle with no echocardiographic evidence of tamponade  - Monitor hemodynamics     RESPIRATORY  # Respiratory failure second to PNA vs volume down vs PE?   - Hx of COPD with chronic respiratory failure with tracheostomy and vent dependence  - Admitted for angioplasty of RUE AVF, however course complicated by leukocytosis with concern for recurrent trachitis/ PNA.   - Course complicated by hypoxia during HD likely volume down vs migration of RUE brachial DVT with PE?   - Held volume removal, bolus given, and hypoxia improved.   - CTA CHEST without PE  - Course complicated by LLL mucous plug and IPV started with improvement, however recurrent PNA concern and CT with worsening LLL atelectasis.    - Continue on albuterol, NS 0.9 and IPV  - Continue on vent 15/400/6/40 with PS trials as able    HEENT   # Hemoptysis   - Wife reported hemoptysis while at nursing home 2 weeks prior to admission   - No hemoptysis noted on admission   - Hemoptysis returned in house   - CTA CHEST 2/24 without PE  - Bronch 2/26 with no evidence of bleeding   - Bleeding resolved     GI  # Dysphagia with PEG   - Passed SS with advanced diet to easy to chew with thin liquids in 7/2024, however since recurrent aspiration in 10/2024 now with PEG/ vent dependence   - Continue on PEG TF and changed to bolus feeds   - Monitor tolerance     RENAL  # ESRD on HD   # Dehydration   - Resumed on HD with no flow issue from AVF   - Course complicated by hypoxia with concern for volume down given small IVC and elevated lactate on 2/21  - Volume removal held, lactate improved, and hypoxia improved.   - Continue on HD MWF in house per renal   - Return to HD MTRF outpatient   - Monitor renal function, electrolytes and UOP     # AVF trouble   # AVF oozing   - Presented for RUE fistulogram and angioplasty with vascular on 2/18   - Oozing from AVF post HD, surgicell placed, and AC held with improvement.  - Discussed with vascular and no need for AC given DVT as below is chronic   - RPT DOPPLER with again noted DVT in right stented brachial vein, however als noted with significantly elevated flow velocities with turbulence at the anastomotic site. Wall thickening noted at the proximal segment of the outflow vein, which is otherwise patent. Distal segment of the outflow vein appears patent without thrombosis evidence.   - Discussed with vascular and given patent AVF with no HD trouble no need for fistulogram at this time.     # Hyponatremia likely volume down   - Concern for volume issues, however overall appears volume down with serum osmo 307  - Sodium 128 and improved with HD/ bolus during HD.   - Trend sodium with HD for now     # Elevated lactate   - Lactate elevated likely second to volume down?   - Post bolus lactate trending down from 3.3 to 2.7 to 1.3    INFECTIOUS DISEASE  # Candida auris and E. Faecium bacteremia   - WBC rising and low grade fever   - PanCT with worsening LLL atelectasis and BL small PLEFs  - BCx 3/12 with MRSE, candida auris and enterococcus faecium   - BCx 3/13 and 3/14 negative   - TTE 3/13 negative for vegetations   - Optho eval 3/13 without evidence of endophthalmitis  - WOC eval 3/13 with no concern for wound infection  - Prior SCx with  PSA sensitive to zosyn    - Continue on vanco on HD days (3/13, 3/14 ...) and zosyn and caspo (3/12 - ) pending RPT SCx     # Recurrent LLL PNA   - CXR with LLL mucous plug   - POCUS with LLL consolidation vs atelectasis   - Recent BAL with PSA as below   - FULL RVP negative  - SCx with PSA  - Fevers returned and zosyn (3/3 - 3/11)     # Hemoptysis   - Hemoptysis as above   - BAL with PSA as prior   - No fever and monitor off ABX     # Trachitis vs PNA/ UTI   - Hx of steno and PSA in sputum   - Flu/ COVID negative   - MRSA negative   - UCx with enterococcus   - SCx with  PSA   - Found with leukocytosis and started on cefepime and christiano (2/19) and vanco on HD days (2/21).   - No further steno seen in sputum and continue on cefepime (2/19 - 2/25) and vanco on HD days (2/21, 2/22 - 2/25)   - WBC increased likely reactive to hypoxic event and now improving  - ID following     # PPX   - MRSA PCR negative  - Candida auris PCR POSITIVE  - Continue on isolation precautions per IC    HEME  # AOCD   - Presented for angioplasty and found with anemia to 6.7 s/p 1U PRBC 2/19 and 3/14 and 1/2 PRBC 2/23 and monitoring HH   - Anemia panel with concern for AOCD   - No overt signs of bleeding  - Continue on EPO and Fe     # Heparin resistance?   - PTT persistently low despite increasing heparin GTT   - Resume on heparin GTT and anti Xa level supra therapeutic on but PTT remained low  - Patient ultimately with concern for heparin resistance    VASCULAR  # RUE DVT   - RUE edema noted with age-indeterminate, non-occlusive deep vein thrombosis noted in the right brachial vein.   - Case discussed with vascular who recommends full AC   - CTH from prior with encephalomalcia, however no hx of intracranial bleed   - Prior UGIB while on AC, however discharged on eliquis in 7/2024 and completed 6 month course for RLE DVT   - Continue on heparin GTT, however held for hemoptysis and resistance   - Continued on argatroban with good tolerance and then changed to eliquis   - Case discussed vascular and DVT likely chronic and no need for eliquis     ENDOCRINE  # DM2 A1C 14.0 (1/2024) > 6.4 (2/2025)  - Presented with hyperglycemia and continued on lantus and ISS   - Increased lantus, however FS continues to trend in 200s and changed to NPH with improvement.  - TF interrupted for bronchoscopy and adjustment to bolus feeds and FS dropped.   - NPH stopped and FS improved, however trended back up with continuation of tube feeds.   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 Patent recently admitted in 6/2024 for left pontine and cerebellar CVA requiring tracheostomy. While at [facility] patient decannulated and passed SS with advanced diet to easy to chew with thin liquids, but complicated COVID requiring transfer to [facility] in 7/2024 and then ultimately discharged home. Per wife patient was doing well at home until 10/2024 when he was found with RLL with concern for aspiration requiring intubation, then tracheostomy, and ultimately discharged to NH with vent dependence in 11/2024. Patient now presented for RUE fistulogram and angioplasty with vascular, however course complicated by leukocytosis with concern for recurrent trachitis vs PNA and UTI, AOCD and hyperglycemia. Admitted to RCU for further management. Found with  PSA trachitis/ PNA and enterococcus faecium UTI. Course complicated by RUE brachial DVT and hypoxia with HD with concern for volume down vs PE? Volume removal held and hypoxia improved. AC given empirically and CTA CHEST with no PE. Case discussed with vascular and since brachial DVT appears old no need for chronic AC . Course further complicated by PSA LLL PNA and completed ABX followed by low grade fever and worsening leukocytosis. Found with MRSE, Candida auris and E. Faecium bacteremia and likely recurrent  PNA    NEUROLOGY  # Poor mentation second to metabolic encephalopathy vs psychosomatic/ depression   - Hx of L cerebellar and pontene embolic CVA x 2 with residual R hemiplegia   - AOx0, bedbound, and nonverbal at baseline per report, however per exam patient able to open eyes, able to follow commands on left (LUE>LLE) with SEVERE weakness, however noted with R hemiplegia per baseline  - Nods yes and no occasionally however keeps eyes closed likely psychosomatic vs metabolic encephalopathy with infections?   - Last CTH in 10/2024 with no acute findings   - Hold [facility] CTH for now   - Monitor mentation     CARDIOVASCULAR  # Hx of HTN and HLD  - Continue on hydral 75 (increased 3/13), norvasc 10, and labetalol 100 mg BID   - Monitor BP     # Incidental Pericardial effusion   - Incidental small pericardial effusion seen adjacent to right ventricle, however IVC appears flat and LE without edema with low concern for RV failure.   - Prior TTE reviewed from 4/2024 with normal RVLVSF with no pericardial effusion noted at the time.   - TTE 2/23 with EF 65, normal LVRVSF, mild LAD, normal RA, mild pulmonary hypertension, and small pericardial effusion noted adjacent to the anterior right ventricle with no echocardiographic evidence of tamponade  - Monitor hemodynamics     RESPIRATORY  # Respiratory failure second to PNA vs volume down vs PE?   - Hx of COPD with chronic respiratory failure with tracheostomy and vent dependence  - Admitted for angioplasty of RUE AVF, however course complicated by leukocytosis with concern for recurrent trachitis/ PNA.   - Course complicated by hypoxia during HD likely volume down vs migration of RUE brachial DVT with PE?   - Held volume removal, bolus given, and hypoxia improved.   - CTA CHEST without PE  - Course complicated by LLL mucous plug and IPV started with improvement, however recurrent PNA concern and CT with worsening LLL atelectasis.    - Continue on albuterol, NS 0.9 and IPV  - Continue on vent 15/400/6/40 with PS trials as able    HEENT   # Hemoptysis   - Wife reported hemoptysis while at nursing home 2 weeks prior to admission   - No hemoptysis noted on admission   - Hemoptysis returned in house   - CTA CHEST 2/24 without PE  - Bronch 2/26 with no evidence of bleeding   - Bleeding resolved     GI  # Dysphagia with PEG   - Passed SS with advanced diet to easy to chew with thin liquids in 7/2024, however since recurrent aspiration in 10/2024 now with PEG/ vent dependence   - Continue on PEG TF and changed to bolus feeds   - Monitor tolerance     RENAL  # ESRD on HD   # Dehydration   - Resumed on HD with no flow issue from AVF   - Course complicated by hypoxia with concern for volume down given small IVC and elevated lactate on 2/21  - Volume removal held, lactate improved, and hypoxia improved.   - Continue on HD MWF in house per renal   - Return to HD MTRF outpatient   - Monitor renal function, electrolytes and UOP     # AVF trouble   # AVF oozing   - Presented for RUE fistulogram and angioplasty with vascular on 2/18   - Oozing from AVF post HD, surgicell placed, and AC held with improvement.  - Discussed with vascular and no need for AC given DVT as below is chronic   - RPT DOPPLER with again noted DVT in right stented brachial vein, however als noted with significantly elevated flow velocities with turbulence at the anastomotic site. Wall thickening noted at the proximal segment of the outflow vein, which is otherwise patent. Distal segment of the outflow vein appears patent without thrombosis evidence.   - Discussed with vascular and given patent AVF with no HD trouble no need for fistulogram at this time.     # Hyponatremia likely volume down   - Concern for volume issues, however overall appears volume down with serum osmo 307  - Sodium 128 and improved with HD/ bolus during HD.   - Trend sodium with HD for now     # Elevated lactate   - Lactate elevated likely second to volume down?   - Post bolus lactate trending down from 3.3 to 2.7 to 1.3    INFECTIOUS DISEASE  # Candida auris and E. Faecium bacteremia   - WBC rising and low grade fever   - PanCT with worsening LLL atelectasis and BL small PLEFs  - BCx 3/12 with MRSE, candida auris and enterococcus faecium   - BCx 3/13 and 3/14 negative   - TTE 3/13 negative for vegetations   - Optho eval 3/13 without evidence of endophthalmitis  - WOC eval 3/13 with no concern for wound infection  - Prior SCx with  PSA sensitive to zosyn    - Continue on vanco on HD days (3/13, 3/14, 3/17...) and and caspo through 3/26 for 2 week course   - Continue on zosyn (3/12 - 3/18)     # Recurrent LLL PNA   - CXR with LLL mucous plug   - POCUS with LLL consolidation vs atelectasis   - Recent BAL with PSA as below   - FULL RVP negative  - SCx with PSA  - Fevers returned and zosyn (3/3 - 3/11)     # Hemoptysis   - Hemoptysis as above   - BAL with PSA as prior   - No fever and monitor off ABX     # Trachitis vs PNA/ UTI   - Hx of steno and PSA in sputum   - Flu/ COVID negative   - MRSA negative   - UCx with enterococcus   - SCx with  PSA   - Found with leukocytosis and started on cefepime and christiano (2/19) and vanco on HD days (2/21).   - No further steno seen in sputum and continue on cefepime (2/19 - 2/25) and vanco on HD days (2/21, 2/22 - 2/25)   - WBC increased likely reactive to hypoxic event and now improving  - ID following     # PPX   - MRSA PCR negative  - Candida auris PCR POSITIVE  - Continue on isolation precautions per IC    HEME  # AOCD   - Presented for angioplasty and found with anemia to 6.7 s/p 1U PRBC 2/19 and 3/14 and 1/2 PRBC 2/23 and monitoring HH   - Anemia panel with concern for AOCD   - No overt signs of bleeding  - Continue on EPO and Fe     # Heparin resistance?   - PTT persistently low despite increasing heparin GTT   - Resume on heparin GTT and anti Xa level supra therapeutic on but PTT remained low  - Patient ultimately with concern for heparin resistance    VASCULAR  # RUE DVT   - RUE edema noted with age-indeterminate, non-occlusive deep vein thrombosis noted in the right brachial vein.   - Case discussed with vascular who recommends full AC   - CTH from prior with encephalomalcia, however no hx of intracranial bleed   - Prior UGIB while on AC, however discharged on eliquis in 7/2024 and completed 6 month course for RLE DVT   - Continue on heparin GTT, however held for hemoptysis and resistance   - Continued on argatroban with good tolerance and then changed to eliquis   - Case discussed vascular and DVT likely chronic and no need for eliquis     ENDOCRINE  # DM2 A1C 14.0 (1/2024) > 6.4 (2/2025)  - Presented with hyperglycemia and continued on lantus and ISS   - Increased lantus, however FS continues to trend in 200s and changed to NPH with improvement.  - TF interrupted for bronchoscopy and adjustment to bolus feeds and FS dropped.   - NPH stopped and FS improved, however trended back up with continuation of tube feeds.   - NPH resumed, however FS continues to wax and wane and case discussed with endocrine and changed to lantus 12 and lispro 6 with ISS  - Monitor FS and adjust as needed  - Endocrine following     ETHICS/ GOC    - Palliative discussion on 3/5  - Remains FULL CODE     DISPO - Back to NH when able

## 2025-03-18 LAB
-  AZTREONAM: SIGNIFICANT CHANGE UP
-  CEFEPIME: SIGNIFICANT CHANGE UP
-  CEFTAZIDIME/AVIBACTAM: SIGNIFICANT CHANGE UP
-  CEFTAZIDIME: SIGNIFICANT CHANGE UP
-  CEFTOLOZANE/TAZOBACTAM: SIGNIFICANT CHANGE UP
-  CIPROFLOXACIN: SIGNIFICANT CHANGE UP
-  IMIPENEM: SIGNIFICANT CHANGE UP
-  LEVOFLOXACIN: SIGNIFICANT CHANGE UP
-  MEROPENEM: SIGNIFICANT CHANGE UP
-  PIPERACILLIN/TAZOBACTAM: SIGNIFICANT CHANGE UP
ANION GAP SERPL CALC-SCNC: 14 MMOL/L — SIGNIFICANT CHANGE UP (ref 7–14)
BLANDM BLD POS QL PROBE: SIGNIFICANT CHANGE UP
BUN SERPL-MCNC: 48 MG/DL — HIGH (ref 7–23)
CA-I BLD-SCNC: 0.97 MMOL/L — LOW (ref 1.15–1.29)
CHLORIDE SERPL-SCNC: 91 MMOL/L — LOW (ref 98–107)
CO2 SERPL-SCNC: 25 MMOL/L — SIGNIFICANT CHANGE UP (ref 22–31)
CREAT SERPL-MCNC: 2.89 MG/DL — HIGH (ref 0.5–1.3)
CULTURE RESULTS: ABNORMAL
CULTURE RESULTS: SIGNIFICANT CHANGE UP
CULTURE RESULTS: SIGNIFICANT CHANGE UP
EGFR: 22 ML/MIN/1.73M2 — LOW
EGFR: 22 ML/MIN/1.73M2 — LOW
GLUCOSE BLDC GLUCOMTR-MCNC: 134 MG/DL — HIGH (ref 70–99)
GLUCOSE BLDC GLUCOMTR-MCNC: 136 MG/DL — HIGH (ref 70–99)
GLUCOSE BLDC GLUCOMTR-MCNC: 160 MG/DL — HIGH (ref 70–99)
GLUCOSE BLDC GLUCOMTR-MCNC: 166 MG/DL — HIGH (ref 70–99)
GLUCOSE SERPL-MCNC: 149 MG/DL — HIGH (ref 70–99)
MAGNESIUM SERPL-MCNC: 2.6 MG/DL — SIGNIFICANT CHANGE UP (ref 1.6–2.6)
METHOD TYPE: SIGNIFICANT CHANGE UP
ORGANISM # SPEC MICROSCOPIC CNT: ABNORMAL
PHOSPHATE SERPL-MCNC: 3.2 MG/DL — SIGNIFICANT CHANGE UP (ref 2.5–4.5)
POTASSIUM SERPL-MCNC: 4.4 MMOL/L — SIGNIFICANT CHANGE UP (ref 3.5–5.3)
POTASSIUM SERPL-SCNC: 4.4 MMOL/L — SIGNIFICANT CHANGE UP (ref 3.5–5.3)
SODIUM SERPL-SCNC: 130 MMOL/L — LOW (ref 135–145)
SPECIMEN SOURCE: SIGNIFICANT CHANGE UP

## 2025-03-18 PROCEDURE — 99233 SBSQ HOSP IP/OBS HIGH 50: CPT

## 2025-03-18 PROCEDURE — 99232 SBSQ HOSP IP/OBS MODERATE 35: CPT

## 2025-03-18 RX ADMIN — Medication 2.5 MILLIGRAM(S): at 02:12

## 2025-03-18 RX ADMIN — INSULIN LISPRO 6 UNIT(S): 100 INJECTION, SOLUTION INTRAVENOUS; SUBCUTANEOUS at 12:41

## 2025-03-18 RX ADMIN — INSULIN LISPRO 1: 100 INJECTION, SOLUTION INTRAVENOUS; SUBCUTANEOUS at 23:10

## 2025-03-18 RX ADMIN — CASPOFUNGIN ACETATE 260 MILLIGRAM(S): 5 INJECTION, POWDER, LYOPHILIZED, FOR SOLUTION INTRAVENOUS at 10:38

## 2025-03-18 RX ADMIN — Medication 1 TABLET(S): at 12:45

## 2025-03-18 RX ADMIN — INSULIN LISPRO 6 UNIT(S): 100 INJECTION, SOLUTION INTRAVENOUS; SUBCUTANEOUS at 06:31

## 2025-03-18 RX ADMIN — INSULIN LISPRO 1: 100 INJECTION, SOLUTION INTRAVENOUS; SUBCUTANEOUS at 17:54

## 2025-03-18 RX ADMIN — INSULIN LISPRO 6 UNIT(S): 100 INJECTION, SOLUTION INTRAVENOUS; SUBCUTANEOUS at 23:10

## 2025-03-18 RX ADMIN — Medication 1 APPLICATION(S): at 12:43

## 2025-03-18 RX ADMIN — AMLODIPINE BESYLATE 10 MILLIGRAM(S): 10 TABLET ORAL at 06:30

## 2025-03-18 RX ADMIN — WHITE PETROLATUM 1 APPLICATION(S): 1 OINTMENT TOPICAL at 06:32

## 2025-03-18 RX ADMIN — Medication 75 MILLIGRAM(S): at 06:29

## 2025-03-18 RX ADMIN — Medication 75 MILLIGRAM(S): at 14:21

## 2025-03-18 RX ADMIN — WHITE PETROLATUM 1 APPLICATION(S): 1 OINTMENT TOPICAL at 17:51

## 2025-03-18 RX ADMIN — Medication 2.5 MILLIGRAM(S): at 19:40

## 2025-03-18 RX ADMIN — HEPARIN SODIUM 5000 UNIT(S): 1000 INJECTION INTRAVENOUS; SUBCUTANEOUS at 17:54

## 2025-03-18 RX ADMIN — Medication 2.5 MILLIGRAM(S): at 07:30

## 2025-03-18 RX ADMIN — Medication 25 GRAM(S): at 06:29

## 2025-03-18 RX ADMIN — Medication 300 MILLIGRAM(S): at 12:45

## 2025-03-18 RX ADMIN — Medication 25 GRAM(S): at 17:54

## 2025-03-18 RX ADMIN — Medication 40 MILLIGRAM(S): at 06:33

## 2025-03-18 RX ADMIN — Medication 15 MILLILITER(S): at 17:55

## 2025-03-18 RX ADMIN — HEPARIN SODIUM 5000 UNIT(S): 1000 INJECTION INTRAVENOUS; SUBCUTANEOUS at 06:29

## 2025-03-18 RX ADMIN — Medication 75 MILLIGRAM(S): at 21:47

## 2025-03-18 RX ADMIN — LABETALOL HYDROCHLORIDE 100 MILLIGRAM(S): 200 TABLET, FILM COATED ORAL at 06:30

## 2025-03-18 RX ADMIN — ATORVASTATIN CALCIUM 20 MILLIGRAM(S): 80 TABLET, FILM COATED ORAL at 21:46

## 2025-03-18 RX ADMIN — INSULIN LISPRO 6 UNIT(S): 100 INJECTION, SOLUTION INTRAVENOUS; SUBCUTANEOUS at 17:55

## 2025-03-18 RX ADMIN — INSULIN GLARGINE-YFGN 12 UNIT(S): 100 INJECTION, SOLUTION SUBCUTANEOUS at 23:09

## 2025-03-18 RX ADMIN — Medication 15 MILLILITER(S): at 06:33

## 2025-03-18 RX ADMIN — Medication 2.5 MILLIGRAM(S): at 13:47

## 2025-03-18 NOTE — CHART NOTE - NSCHARTNOTESELECT_GEN_ALL_CORE
Event Note
Medical ACP/Event Note
Nutrition Services
Nutrition Services
POCUS/Event Note
POCUS/Event Note
Vascular Surgery
ABG/Event Note
Event Note
Nutrition Services
POCUS/Event Note
Palliative Care/Off Service Note
blood transfusion/Event Note

## 2025-03-18 NOTE — PROGRESS NOTE ADULT - ASSESSMENT
75 yo M with hx trach/vent (last changed 10/23/24), CVA x2 with residual R hemiplegia, COPD, ESRD on HD MWF (last 2/17), pressure ulcer presenting to [facility] ED for leukocytosis. Patient was initially up in the cath lab earlier today with vascular surgery for fistulogram and noted to have a WBC of 18 and sent to ED. Patient nonverbal at baseline, history provided by son. Per wife, patient at normal baseline, somewhat able to make needs known and is not complaining of any pain. Endocrinology was consulted for management of diabetes mellitus type 2 - patient on TF with hyperglycemia.     Type 2 Diabetes Mellitus  - HbA1c 6.4   - home regimen: on basal/bolus and on TF outpatient - as per wife, he was on humalog 2 units TID and flextouch pen (tresiba?) 6 units at bedtime   - from [facility] - please confirm what [facility] has been doing with regards to pt insulin regimen.      Inpatient plan   - FS goal 100-180 mg/dl: FS stable.   - On bolus tube feeds 290 mL every 6 hours  - continue Lantus 12 units at bedtime  - Continue Admelog 6 units every 6 hours.  Hold if tube feeds are on hold.  -Please change abx to be in NS instead of dextrose.   - continue low Admelog correctional scale q6  - FS q6  - Please update endocrine if any changes to tube feeding    Discharge plan  - If on bolus feeds on discharge, continue basal/bolus regimen, doses TBD.      Hypertension  - BP goal <130/80  - continue norvasc, labetalol and hydralazine  - Management as per primary team    Hyperlipidemia  - LDL goal <70   - continue statin   - management per primary team     EMILY Greene-BC  Nurse Practitioner  Division of Endocrinology & Diabetes  Available via Microsoft Teams

## 2025-03-18 NOTE — PROVIDER CONTACT NOTE (CRITICAL VALUE NOTIFICATION) - ACTION/TREATMENT ORDERED:
Provider ordered 1/2 unit PRBC
Provider Notified
Provider ordered to hold Heparin Gtt for 2 hours and ordered a CBC for 12:00.
no interventions needed at this time
Provider notified.  Hold Heparin for 1Hr Restart at 11cc
HARPER SIEGEL notified no new orders.
Provider Notified
Provider Notified/ Repeat type and screen, CBC, and turn off heparin drip

## 2025-03-18 NOTE — PROGRESS NOTE ADULT - SUBJECTIVE AND OBJECTIVE BOX
RCU PROGRESS NOTE     CHIEF COMPLAINT: Patient is a 74y old  Male who presents with a chief complaint of 2/18/25 was in cath lab earlier today with vascular surgery for fistulogram and noted to have a WBC of 18 and sent to ED and admitted (04 Mar 2025 12:22)      INTERVAL EVENTS:  - NO interval events overnight  - Zosyn done today   - Caspo and vanco x 2 weeks   - DISPO planning    REVIEW OF SYSTEMS: Seen by bedside during AM rounds and unable to assess ROS as vented     Mode: AC/ CMV (Assist Control/ Continuous Mandatory Ventilation), RR (machine): 15, TV (machine): 400, FiO2: 40, PEEP: 6, ITime: 0.8, MAP: 9, PIP: 19      OBJECTIVE:  ICU Vital Signs Last 24 Hrs  T(C): 36.6 (16 Mar 2025 04:00), Max: 36.8 (15 Mar 2025 12:00)  T(F): 97.8 (16 Mar 2025 04:00), Max: 98.3 (15 Mar 2025 12:00)  HR: 69 (16 Mar 2025 04:00) (62 - 70)  BP: 129/51 (16 Mar 2025 04:00) (107/47 - 136/52)  BP(mean): 74 (16 Mar 2025 04:00) (64 - 75)  ABP: --  ABP(mean): --  RR: 16 (16 Mar 2025 04:00) (16 - 17)  SpO2: 100% (16 Mar 2025 04:00) (98% - 100%)    O2 Parameters below as of 16 Mar 2025 08:32  Patient On (Oxygen Delivery Method): ventilator          Mode: AC/ CMV (Assist Control/ Continuous Mandatory Ventilation), RR (machine): 15, TV (machine): 400, FiO2: 40, PEEP: 6, ITime: 0.8, MAP: 9, PIP: 19    03-15 @ 07:01  -  03-16 @ 07:00  --------------------------------------------------------  IN: 1660 mL / OUT: 2400 mL / NET: -740 mL      CAPILLARY BLOOD GLUCOSE  POCT Blood Glucose.: 183 mg/dL (16 Mar 2025 04:59)      HOSPITAL MEDICATIONS:  MEDICATIONS  (STANDING):  albuterol    0.083% 2.5 milliGRAM(s) Nebulizer every 6 hours  amLODIPine   Tablet 10 milliGRAM(s) Oral daily  AQUAPHOR (petrolatum Ointment) 1 Application(s) Topical two times a day  atorvastatin 20 milliGRAM(s) Oral at bedtime  caspofungin IVPB 50 milliGRAM(s) IV Intermittent every 24 hours  caspofungin IVPB      chlorhexidine 0.12% Liquid 15 milliLiter(s) Oral Mucosa every 12 hours  chlorhexidine 2% Cloths 1 Application(s) Topical daily  dextrose 5%. 1000 milliLiter(s) (100 mL/Hr) IV Continuous <Continuous>  dextrose 5%. 1000 milliLiter(s) (50 mL/Hr) IV Continuous <Continuous>  dextrose 50% Injectable 25 Gram(s) IV Push once  dextrose 50% Injectable 12.5 Gram(s) IV Push once  dextrose 50% Injectable 25 Gram(s) IV Push once  epoetin reilly-epbx (RETACRIT) Injectable 29894 Unit(s) IV Push <User Schedule>  ferrous    sulfate Liquid 300 milliGRAM(s) Enteral Tube daily  glucagon  Injectable 1 milliGRAM(s) IntraMuscular once  heparin   Injectable 5000 Unit(s) SubCutaneous every 12 hours  hydrALAZINE 75 milliGRAM(s) Oral three times a day  insulin glargine Injectable (LANTUS) 12 Unit(s) SubCutaneous at bedtime  insulin lispro (ADMELOG) corrective regimen sliding scale   SubCutaneous every 6 hours  insulin lispro Injectable (ADMELOG) 6 Unit(s) SubCutaneous every 6 hours  labetalol 100 milliGRAM(s) Oral two times a day  Nephro-noam 1 Tablet(s) Oral daily  pantoprazole   Suspension 40 milliGRAM(s) Oral before breakfast  piperacillin/tazobactam IVPB.. 3.375 Gram(s) IV Intermittent every 12 hours  sodium chloride 0.9% for Nebulization 3 milliLiter(s) Nebulizer every 6 hours    MEDICATIONS  (PRN):  dextrose Oral Gel 15 Gram(s) Oral once PRN Blood Glucose LESS THAN 70 milliGRAM(s)/deciliter  sodium chloride 0.9% Bolus. 100 milliLiter(s) IV Bolus every 5 minutes PRN SBP LESS THAN or EQUAL to 80 mmHg      PHYSICAL EXAMINATION  General: NAD   HEENT: Trach present  Cards: S1/S2, no murmurs   Pulm: Course vent sounds bilaterally and slightly decreased in LLL. No wheezes.   Abdomen: Soft, nondistended and nontender. PEG (+)   Extremities: No pedal edema. THAI of left upper and lower extremities noted and right hemiparesis per baseline with CVA hx.  Neurology: Awakens with eyes open and follows commands, but quickly closes eyes and nods yes and no to simple questions with no acute focal neurological deficits     LABS:                                             8.9    10.90 )-----------( 271      ( 17 Mar 2025 05:11 )             28.7       03-17    130[L]  |  89[L]  |  67[H]  ----------------------------<  151[H]  4.5   |  22  |  3.72[H]    Ca    9.6      17 Mar 2025 05:11  Phos  3.3     03-17  Mg     2.80     03-17          Urinalysis Basic - ( 16 Mar 2025 08:15 )  Color: x / Appearance: x / SG: x / pH: x  Gluc: 125 mg/dL / Ketone: x  / Bili: x / Urobili: x   Blood: x / Protein: x / Nitrite: x   Leuk Esterase: x / RBC: x / WBC x   Sq Epi: x / Non Sq Epi: x / Bacteria: x            PAST MEDICAL & SURGICAL HISTORY:  ESRD on hemodialysis      HTN (hypertension)      Insulin dependent type 2 diabetes mellitus      History of cerebrovascular accident (CVA) with residual deficit      History of DVT of lower extremity      HLD (hyperlipidemia)      CVA (cerebrovascular accident)      Aphasia      Tracheostomy in place      PEG (percutaneous endoscopic gastrostomy) status      GERD (gastroesophageal reflux disease)      Constipation      Pressure ulcer      Arteriovenous fistula      S/P percutaneous endoscopic gastrostomy (PEG) tube placement      History of tracheostomy          FAMILY HISTORY:  FHx: diabetes mellitus (Father, Aunt)        Social History:      RADIOLOGY:  [ ] Reviewed and interpreted by me    PULMONARY FUNCTION TESTS:    EKG:

## 2025-03-18 NOTE — PROVIDER CONTACT NOTE (CRITICAL VALUE NOTIFICATION) - PERSON GIVING RESULT:
[facility]
Oleg DEMPSEY
JANNY Garcia
LISANDRO Zimmer
Mary Lan
Cecilia Roberts
Kaye Trinh
tamra PAUL

## 2025-03-18 NOTE — PROGRESS NOTE ADULT - ASSESSMENT
75 y/o M PMhx trach/vent (last changed 10/23/24), CVA x2 with residual R hemiplegia, COPD, ESRD on HD MWF who presented to ED for leukocytosis when initially planned for fistulogram and noted to have a WBC of 18.   found to have DVT  he was treated for E faecium UTI, and  pseudomonas VAP s/p cefepime, completed 2/25  he developed fever and was started on zosyn  bronch cx grew pseudomonas    E faecium bacteremia, candidemia  blood cx from 3/12- cx w/ candida auris and E faecium, MRSE  - E faecium vanc sensitive  - candida auris- caspofungin sensitive  CT C/A/P- Multifocal pneumonia. Fluid and debris in the trachea and bronchi, left greater than right. Aspiration is a consideration. Infiltration of the soft tissues posterior to the bilateral ischia and right greater trochanter, without evidence of underlying drainable fluid collection.  TTE without evidence of vegetations  s/p optho eval- no evidence of endophthalmitis  repeat blood cultures- NGTD    pseudomonas PNA- treated  trach aspirate  pseudomonas  s/p cefepime x 7 days, completed 2/25  s/p zosyn x 7 days, completed 3/10    ESRD  HD per nephrology    Recommendations  c/w zosyn w/ last day today 3/18  c/w caspofungin  check random vanc level prior to HD on 3/19  plan for 2 week course of vanc and caspofungin w/ last day 3/27  aspiration precautions  contact isolation for candida auris    Steven Torres M.D.  [facility]  [phone]  After 5pm on weekdays and all day on weekends - please call [phone]  Available on microsoft teams    Thank you for consulting us and involving us in the management of this patients case. In addition to reviewing history, imaging, documents, labs, microbiology, took into account antibiotic stewardship, local antibiogram and infection control strategies and potential transmission issues.  75 y/o M PMhx trach/vent (last changed 10/23/24), CVA x2 with residual R hemiplegia, COPD, ESRD on HD MWF who presented to ED for leukocytosis when initially planned for fistulogram and noted to have a WBC of 18.   found to have DVT  he was treated for E faecium UTI, and  pseudomonas VAP s/p cefepime, completed 2/25  he developed fever and was started on zosyn  bronch cx grew pseudomonas    E faecium bacteremia, candidemia  blood cx from 3/12- cx w/ candida auris and E faecium, MRSE  - E faecium vanc sensitive  - candida auris- caspofungin sensitive  CT C/A/P- Multifocal pneumonia. Fluid and debris in the trachea and bronchi, left greater than right. Aspiration is a consideration. Infiltration of the soft tissues posterior to the bilateral ischia and right greater trochanter, without evidence of underlying drainable fluid collection.  TTE without evidence of vegetations  s/p optho eval- no evidence of endophthalmitis  repeat blood cultures- NGTD    pseudomonas PNA- treated  trach aspirate  pseudomonas  s/p cefepime x 7 days, completed 2/25  s/p zosyn x 7 days, completed 3/10  repeat sputum cx w/ pseudomonas R to zosyn, however, vent settings are stable and patient clinically improving  trach likely colonized    ESRD  HD per nephrology    Recommendations  c/w zosyn w/ last day today 3/18  c/w caspofungin  check random vanc level prior to HD on 3/19  plan for 2 week course of vanc and caspofungin w/ last day 3/27  aspiration precautions  contact isolation for candida auris    Steven Torres M.D.  [facility]  [phone]  After 5pm on weekdays and all day on weekends - please call [phone]  Available on microsoft teams    Thank you for consulting us and involving us in the management of this patients case. In addition to reviewing history, imaging, documents, labs, microbiology, took into account antibiotic stewardship, local antibiogram and infection control strategies and potential transmission issues.

## 2025-03-18 NOTE — CHART NOTE - NSCHARTNOTEFT_GEN_A_CORE
RCU PA NOTE     Called by RN for patient noted with NSR with HR 60s. Baseline appears to be 80s. Hemodynamically stable. Will check TSH. Hold current dose on labetalol and check BMP post HD yesterday. Will continue to monitor and if continues then will dc labetalol and uptitrate hydral as needed for BP control.     JANET Mercedes, PA-C  Department of Medicine/ RCU  In house RCU [facility] [id number]  In house Medicine [facility] [id number]  Reachable via teams RCU PA NOTE     Called by RN for patient noted with NSR with HR 60s. Baseline appears to be 80s. Hemodynamically stable. Will check TSH. Hold current dose on labetalol and check BMP and calcium ionized post HD yesterday. Will continue to monitor and if continues then will dc labetalol and uptitrate hydral as needed for BP control.     JANET Mercedes, PA-C  Department of Medicine/ RCU  In house RCU [facility] [id number]  In house Medicine [facility] [id number]  Reachable via teams

## 2025-03-18 NOTE — PROVIDER CONTACT NOTE (CRITICAL VALUE NOTIFICATION) - BACKGROUND
patient admitted for UTI
Admitted for UTI
GERD; HLD; CVA
COPD; CVA: Hemiplegia;
HTN; CKD; CAD
Admitted for UTI.
PMHx of COPD, respiratory failure, RLE DVT

## 2025-03-18 NOTE — PROVIDER CONTACT NOTE (CRITICAL VALUE NOTIFICATION) - NAME OF MD/NP/PA/DO NOTIFIED:
Adams Rowley
Roe Rhodes
ROBBIN Majano
Roe Rhodes
Adams Rowley
Christi Overton
HARPER SIEGEL
Anastasiya Lezama

## 2025-03-18 NOTE — PROGRESS NOTE ADULT - NS ATTEND AMEND GEN_ALL_CORE FT
74 y.o. male with ESRD on HD via AVF, HTN, DMII, and recent prolonged hospitalization (4-6/2024) for baclofen toxicity and acute ischemic CVA of the left talya/cerebellum c/b acute hypoxemic and hypercapnic respiratory failure s/p ETT intubation/MV with failure to wean from ventilator s/p tracheostomy with hospital course further complicated by aspiration and B cereus bacteremia and RLE DVT followed by citrobacter PNA, GIB i/s/o AOCD, and fistula stenosis s/p fistulogram 6/4 ultimately transferred to acute rehab, decannulated, and discharged home until 10/2024 when pt returned to OSH with aspiration PNA c/b hypoxemic respiratory failure requiring ETT intubation/MV followed by tracheostomy placement and d/c to LTCF ([facility]) 11/2024 now presenting to [facility] on 2/18/25 for RUE fistulogram/angioplasty with vascular sx but with hospital course c/b concern fo recurrent PNA and uncontrolled hyperglycemia admitted to RCU for further medical management.    Per vascular no further intervention, no need to be on full a/c 2/2 to fistula bleeding. Not tolerating full a/c.   Completed course of abx but now with C. auris fungemia (3/12), MRSE bacteremia and enterococcus bacteremia. Repeat Blood cultures 3/13 NGTD. CT with dense consolidation.   Repeat Wound care eval. TTE without signs of endocarditis. Ophtho eval - no e/o endophthalmitis       Plan     - CVA with recent strokes, c/w statins, unable to tolerate full a/c  - c/w vent, C/W albuterol and NS nebs. PS as tolerated.   - C/W peg tube feeds  - Bleeding AV fistula, per vascular no acute interventions are planned, Recommending against full a/c 2/2 to bleeding.   - Wound care for pressure ulcers  - Per vascular DVT likely chronic  - HD per renal  - Fevers, s/p cultures now with fungemia, bacteremia. Repeat TTE without signs of endocarditis. Repeat blood cultures 3/13 NGTD. c/w caspo and Zosyn. F/U further ID recs. Monitor levels.   - DVT ppx- SQH  - Dispo- Full code. Prognosis overall is poor.

## 2025-03-18 NOTE — PROVIDER CONTACT NOTE (CRITICAL VALUE NOTIFICATION) - SITUATION
HAS greater than 2
Sputum culture from 3/16, positive for pseudomonas aeurg., resistent to carbopenem
6.5 Hgb
Heparin Anti xa result is elevated at 1.09
pt positive sputum culture pseudomonas anginosa, carbenium camel dominick consistent with body sites.
Received a call regarding a critical of Hgb 6.5 and Hct 20.8
Growth in anaerobic bottle Staph Candida Auris
Melonie Auris Positive on Surveillance Culture

## 2025-03-18 NOTE — PROGRESS NOTE ADULT - ASSESSMENT
74-year-old male hxt consulted for dialysis needs    [facility]:  [facility]  On HD MTTsF via an A-V fistula. s/p fistulogram by vascular 2/18  Continue MWF in hospital  Prolonged bleeding post HD -- vascular consulted. hd via port  hd today  hd today  consent from wife in dialysis unit.  monitor bmp    Hypertension  BP fluctuating; acceptable.  continue norvasc 10 daily and hydralazine increased to 50 tid 3/2  C/W hydralazine 75mg TID.  max uf as tolerated  monitor BP    Anemia:  s/p 1 unit PRBC 2/23  epo with hd  iron sat >20  Given 1 unit of PRBC again today.  monitor Hgb  Transfuse for Hg < 7.    leukocytosis  sepsis work up per team  On Zosyn and now vancomycin.  Monitor trough level.    hyponatremia  in setting of esrd and hyperglycemia  optimize dm control  hd per schedule with UF  monitor Na    Hypokalemia  better  Monitor K.    Hypermagnesemia  HD per schedule.  Monitor Mg.    CKD - MBD:  PTH 25 - low for CKD stage.  Received PhosNaK x1 on 3/8.  PO4 improved.  Monitor PO4 and Ca daily.     74-year-old male hxt consulted for dialysis needs    ESRD:  from [facility]  On HD MTTsF via an A-V fistula. s/p fistulogram by vascular 2/18  Continue MWF in hospital  Prolonged bleeding post HD vascular on board no intervention  hd 3/17 tolerated 2.5L. hd tmr  consent from wife in dialysis unit.  monitor bmp    Hypertension  BP better  continue norvasc 10 daily and hydralazine increased to 50 tid 3/2  C/W hydralazine 75mg TID.  max uf as tolerated  monitor BP    Anemia:  s/p 1 unit PRBC 2/23  epo with hd  iron sat >20  Given 1 unit of PRBC again today.  monitor Hgb  Transfuse for Hg < 7.    leukocytosis  sepsis work up per team  On Zosyn and now vancomycin.  Monitor trough level.    hyponatremia  in setting of esrd and hyperglycemia  optimize dm control  hd per schedule with UF  monitor Na    Hypokalemia  better  Monitor K.    Hypermagnesemia  HD per schedule.  Monitor Mg.    CKD - MBD:  PTH 25 - low for CKD stage.  Received PhosNaK x1 on 3/8.  PO4 improved.  Monitor PO4 and Ca daily.

## 2025-03-18 NOTE — PROVIDER CONTACT NOTE (CRITICAL VALUE NOTIFICATION) - ASSESSMENT
Patient in no acute distress
VS STABLE
6.5 Hgb
HAS greater than 2
Melonie Auris Positive on Surveillance Culture
Patient alert and responds to yes or no questions. No acute bleeding noted.
Growth in anaerobic bottle Staph Candida Auris
Pt alert. VSS. Able to follow commands. No s/s of pain or distress.

## 2025-03-18 NOTE — PROGRESS NOTE ADULT - SUBJECTIVE AND OBJECTIVE BOX
[facility]  AUGUST Carbajal Y. Patel, S. Shah, G. Casimir  [phone]  ([phone] - weekdays after 5pm and weekends)    Name: JIMMY BLAND  Age/Gender: 74y Male  MRN: [id number]    Interval History:  Notes reviewed.   No concerning overnight events.  Afebrile.     Allergies: No Known Allergies      Objective:  Vitals:   T(F): 97.2 (03-18-25 @ 08:00), Max: 97.8 (03-17-25 @ 13:00)  HR: 58 (03-18-25 @ 10:53) (58 - 77)  BP: 126/57 (03-18-25 @ 08:00) (126/57 - 154/61)  RR: 15 (03-18-25 @ 08:00) (15 - 18)  SpO2: 100% (03-18-25 @ 10:53) (96% - 100%)  Physical Examination:  General: frail appearing  HEENT: anicteric, tracheostomy, on vent  Cardio: S1, S2, normal rate  Resp: breathing comfortably  Abd: Soft. Nondistended. PEG  Ext: no LE edema  Skin: warm, dry    Laboratory Studies:  CBC:                       8.9    10.90 )-----------( 271      ( 17 Mar 2025 05:11 )             28.7     WBC Trend:  10.90 03-17-25 @ 05:11  13.13 03-16-25 @ 08:15  12.61 03-15-25 @ 07:14  16.47 03-14-25 @ 09:45  18.65 03-13-25 @ 06:30  18.73 03-12-25 @ 04:53    CMP: 03-17    130[L]  |  89[L]  |  67[H]  ----------------------------<  151[H]  4.5   |  22  |  3.72[H]    Ca    9.6      17 Mar 2025 05:11  Phos  3.3     03-17  Mg     2.80     03-17          Vancomycin Level, Random: 14.6 ug/mL (03-17-25 @ 05:11)    Urinalysis Basic - ( 17 Mar 2025 05:11 )    Color: x / Appearance: x / SG: x / pH: x  Gluc: 151 mg/dL / Ketone: x  / Bili: x / Urobili: x   Blood: x / Protein: x / Nitrite: x   Leuk Esterase: x / RBC: x / WBC x   Sq Epi: x / Non Sq Epi: x / Bacteria: x      Microbiology: reviewed     Culture - Sputum (collected 03-16-25 @ 15:45)  Source: Trach Asp Tracheal Aspirate  Gram Stain (03-17-25 @ 03:24):    Moderate polymorphonuclear leukocytes per low power field    Few Squamous epithelial cells per low power field    Few Squamous epithelial cells seen per oil power field    Moderate Gram Negative Rods seen per oil power field    Few Gram positive cocci in pairs seen per oil power field  Final Report (03-18-25 @ 11:32):    Moderate Pseudomonas aeruginosa (Carbapenem Resistant)    Commensal dominick consistent with body site  Organism: Pseudomonas aeruginosa (Carbapenem Resistant) (03-18-25 @ 11:32)  Organism: Pseudomonas aeruginosa (Carbapenem Resistant) (03-18-25 @ 11:32)      Method Type: CarbaR      -  Resistance Gene to Carbapenem: Nondet  Organism: Pseudomonas aeruginosa (Carbapenem Resistant) (03-18-25 @ 11:32)      Method Type: VIDA      -  Aztreonam: I 16      -  Cefepime: I 16      -  Ceftazidime: R >16      -  Ceftazidime/Avibactam: S <=4      -  Ceftolozane/tazobactam: S <=2      -  Ciprofloxacin: S <=0.25      -  Imipenem: R >8      -  Levofloxacin: S <=0.5      -  Meropenem: R >8      -  Piperacillin/Tazobactam: R >64    Culture - Blood (collected 03-14-25 @ 10:15)  Source: Blood Dialysis Catheter  Preliminary Report (03-17-25 @ 17:01):    No growth at 72 Hours    Culture - Blood (collected 03-14-25 @ 09:45)  Source: Blood Dialysis Catheter  Preliminary Report (03-17-25 @ 17:01):    No growth at 72 Hours    Culture - Blood (collected 03-13-25 @ 08:51)  Source: Blood Blood-Peripheral  Preliminary Report (03-17-25 @ 13:01):    No growth at 4 days    Culture - Blood (collected 03-13-25 @ 08:30)  Source: Blood Blood-Peripheral  Preliminary Report (03-17-25 @ 13:01):    No growth at 4 days    Culture - Blood (collected 03-12-25 @ 04:52)  Source: Blood Blood-Venous  Gram Stain (03-13-25 @ 06:28):    Growth in anaerobic bottle: Gram Positive Cocci in Clusters and Gram    Positive Cocci in Pairs and Chains  Final Report (03-16-25 @ 11:55):    Growth in anaerobic bottle: Enterococcus faecium    Growth in anaerobic bottle: Staphylococcus epidermidis "Susceptibilities    not performed"    Growth in anaerobic bottle: Candida auris    Direct identification is available within approximately 3-5    hours either by Blood Panel Multiplexed PCR or Direct    MALDI-TOF. Details: [email or web address]  Organism: Blood Culture PCR  Enterococcus faecium  Candida auris (03-16-25 @ 11:55)  Organism: Candida auris (03-16-25 @ 11:55)      Method Type: YSTMIC      -  Anidulafungin: S 0.12 For this antifungal agent, the data are based largely on experience with non-neutropenic patients with candidemia.  The clinical relevance of this antifungal agent in other settings is uncertain.      -  Micafungin: S 0.12 For this antifungal agent, the data are based largely on experience with non-neutropenic patients with candidemia.  The clinical relevance of this antifungal agent in other settings is uncertain.      -  Voriconazole: N/I 1 For this antifungal agent, the data are based largely on experience with non-neutropenic patients with candidemia.  The clinical relevance of this antifungal agent in other settings is uncertain.      -  Posaconazole: N/I 0.12 For Candida species, no interpretation is available for any in vitro susceptibility testing.      -  Fluconazole: R >64 There are no CLSI or FDA breakpoints for Candida auris. The breakpoints provided for specific antifungal drugs are based on tentative CDC breakpoints and have been validated by [facility].  Fluconazole: Susceptibility depends on achieving the maximum possible blood level. Doses higher than the standard dosing amount (6mg/kg/day) may be needed in adults with normal renal function and body habitus.  This test was developed and its performance characteristics determined by [facility], [place], [place]  It has not been cleared or approved by the U.S. Food and Drug Administration. S - Susceptible; SDD - Susceptible Dose Dependent; I - Intermediate; R - Resistant; N/I - No Interpretation Available      -  Amphotericin B: N/I 2 For Candida species, no interpretation is available for any in vitro susceptibility testing.      -  Caspofungin: S 0.25 For this antifungal agent, the data are based largely on experience with non-neutropenic patients with candidemia.  The clinical relevance of this antifungal agent in other settings is uncertain.  Organism: Enterococcus faecium (03-16-25 @ 11:55)      Method Type: VIDA      -  Ampicillin: R >8 Predicts results to ampicillin/sulbactam, amoxacillin-clavulanate and  piperacillin-tazobactam.      -  Vancomycin: S 0.5      -  Gentamicin synergy: S <=500      -  Streptomycin synergy: S <=1000  Organism: Blood Culture PCR (03-16-25 @ 11:55)      Method Type: PCR      -  Enterococcus faecium: Detec      -  Staphylococcus epidermidis, Methicillin resistant: Detec      -  Candida auris: Detec    Culture - Blood (collected 03-12-25 @ 04:40)  Source: Blood Blood-Venous  Final Report (03-17-25 @ 10:00):    No growth at 5 days    Culture - Blood (collected 03-09-25 @ 05:20)  Source: Blood Blood-Peripheral  Final Report (03-14-25 @ 09:01):    No growth at 5 days    Culture - Blood (collected 03-09-25 @ 05:05)  Source: Blood Blood-Venous  Final Report (03-14-25 @ 09:01):    No growth at 5 days        Radiology: reviewed     Medications:  albuterol    0.083% 2.5 milliGRAM(s) Nebulizer every 6 hours  amLODIPine   Tablet 10 milliGRAM(s) Oral daily  AQUAPHOR (petrolatum Ointment) 1 Application(s) Topical two times a day  atorvastatin 20 milliGRAM(s) Oral at bedtime  caspofungin IVPB 50 milliGRAM(s) IV Intermittent every 24 hours  caspofungin IVPB      chlorhexidine 0.12% Liquid 15 milliLiter(s) Oral Mucosa every 12 hours  chlorhexidine 2% Cloths 1 Application(s) Topical daily  dextrose 5%. 1000 milliLiter(s) IV Continuous <Continuous>  dextrose 5%. 1000 milliLiter(s) IV Continuous <Continuous>  dextrose 50% Injectable 25 Gram(s) IV Push once  dextrose 50% Injectable 12.5 Gram(s) IV Push once  dextrose 50% Injectable 25 Gram(s) IV Push once  dextrose Oral Gel 15 Gram(s) Oral once PRN  epoetin reilly-epbx (RETACRIT) Injectable 76382 Unit(s) IV Push <User Schedule>  ferrous    sulfate Liquid 300 milliGRAM(s) Enteral Tube daily  glucagon  Injectable 1 milliGRAM(s) IntraMuscular once  heparin   Injectable 5000 Unit(s) SubCutaneous every 12 hours  hydrALAZINE 75 milliGRAM(s) Oral three times a day  insulin glargine Injectable (LANTUS) 12 Unit(s) SubCutaneous at bedtime  insulin lispro (ADMELOG) corrective regimen sliding scale   SubCutaneous every 6 hours  insulin lispro Injectable (ADMELOG) 6 Unit(s) SubCutaneous every 6 hours  labetalol 100 milliGRAM(s) Oral two times a day  Nephro-noam 1 Tablet(s) Oral daily  pantoprazole   Suspension 40 milliGRAM(s) Oral before breakfast  piperacillin/tazobactam IVPB.. 3.375 Gram(s) IV Intermittent <User Schedule>  sodium chloride 0.9% Bolus. 100 milliLiter(s) IV Bolus every 5 minutes PRN  sodium chloride 0.9% for Nebulization 3 milliLiter(s) Nebulizer every 6 hours    Antimicrobials:  caspofungin IVPB 50 milliGRAM(s) IV Intermittent every 24 hours  caspofungin IVPB      piperacillin/tazobactam IVPB.. 3.375 Gram(s) IV Intermittent <User Schedule>

## 2025-03-18 NOTE — PROVIDER CONTACT NOTE (CRITICAL VALUE NOTIFICATION) - TEST AND RESULT REPORTED:
positive culture sputum
Growth in anaerobic bottle Staph Candida Auris
6.5 Hgb
HAS greater than 2
Heparin Anti xa 1.09
Sputum culture
Hgb 6.5 and Hct 20.8
Melonie Auris Positive on Surveillance Culture

## 2025-03-18 NOTE — PROVIDER CONTACT NOTE (CRITICAL VALUE NOTIFICATION) - RECOMMENDATIONS
Contact Provider
Notify Provider
Contact Provider
Awaiting recent Type & Screen
Provider notified
no new orders.

## 2025-03-18 NOTE — PROGRESS NOTE ADULT - ASSESSMENT
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 Patent recently admitted in 6/2024 for left pontine and cerebellar CVA requiring tracheostomy. While at [facility] patient decannulated and passed SS with advanced diet to easy to chew with thin liquids, but complicated COVID requiring transfer to [facility] in 7/2024 and then ultimately discharged home. Per wife patient was doing well at home until 10/2024 when he was found with RLL with concern for aspiration requiring intubation, then tracheostomy, and ultimately discharged to NH with vent dependence in 11/2024. Patient now presented for RUE fistulogram and angioplasty with vascular, however course complicated by leukocytosis with concern for recurrent trachitis vs PNA and UTI, AOCD and hyperglycemia. Admitted to [facility]CU for further management. Found with  PSA trachitis/ PNA and enterococcus faecium UTI. Course complicated by RUE brachial DVT and hypoxia with HD with concern for volume down vs PE? Volume removal held and hypoxia improved. AC given empirically and CTA CHEST with no PE. Case discussed with vascular and since brachial DVT appears old no need for chronic AC . Course further complicated by PSA LLL PNA and completed ABX followed by low grade fever and worsening leukocytosis. Found with MRSE, Candida auris and E. Faecium bacteremia and likely recurrent  PNA    NEUROLOGY  # Poor mentation second to metabolic encephalopathy vs psychosomatic/ depression   - Hx of L cerebellar and pontene embolic CVA x 2 with residual R hemiplegia   - AOx0, bedbound, and nonverbal at baseline per report, however per exam patient able to open eyes, able to follow commands on left (LUE>LLE) with SEVERE weakness, however noted with R hemiplegia per baseline  - Nods yes and no occasionally however keeps eyes closed likely psychosomatic vs metabolic encephalopathy with infections?   - Last CTH in 10/2024 with no acute findings   - Hold [facility] CTH for now   - Monitor mentation     CARDIOVASCULAR  # Hx of HTN and HLD  - Continue on hydral 75 (increased 3/13), norvasc 10, and labetalol 100 mg BID   - Monitor BP     # Incidental Pericardial effusion   - Incidental small pericardial effusion seen adjacent to right ventricle, however IVC appears flat and LE without edema with low concern for RV failure.   - Prior TTE reviewed from 4/2024 with normal RVLVSF with no pericardial effusion noted at the time.   - TTE 2/23 with EF 65, normal LVRVSF, mild LAD, normal RA, mild pulmonary hypertension, and small pericardial effusion noted adjacent to the anterior right ventricle with no echocardiographic evidence of tamponade  - Monitor hemodynamics     RESPIRATORY  # Respiratory failure second to PNA vs volume down vs PE?   - Hx of COPD with chronic respiratory failure with tracheostomy and vent dependence  - Admitted for angioplasty of RUE AVF, however course complicated by leukocytosis with concern for recurrent trachitis/ PNA.   - Course complicated by hypoxia during HD likely volume down vs migration of RUE brachial DVT with PE?   - Held volume removal, bolus given, and hypoxia improved.   - CTA CHEST without PE  - Course complicated by LLL mucous plug and IPV started with improvement, however recurrent PNA concern and CT with worsening LLL atelectasis.    - Continue on albuterol, NS 0.9 and IPV  - Continue on vent 15/400/6/40 with PS trials as able    HEENT   # Hemoptysis   - Wife reported hemoptysis while at nursing home 2 weeks prior to admission   - No hemoptysis noted on admission   - Hemoptysis returned in house   - CTA CHEST 2/24 without PE  - Bronch 2/26 with no evidence of bleeding   - Bleeding resolved     GI  # Dysphagia with PEG   - Passed SS with advanced diet to easy to chew with thin liquids in 7/2024, however since recurrent aspiration in 10/2024 now with PEG/ vent dependence   - Continue on PEG TF and changed to bolus feeds   - Monitor tolerance     RENAL  # ESRD on HD   # Dehydration   - Resumed on HD with no flow issue from AVF   - Course complicated by hypoxia with concern for volume down given small IVC and elevated lactate on 2/21  - Volume removal held, lactate improved, and hypoxia improved.   - Continue on HD MWF in house per renal   - Return to HD MTRF outpatient   - Monitor renal function, electrolytes and UOP     # AVF trouble   # AVF oozing   - Presented for RUE fistulogram and angioplasty with vascular on 2/18   - Oozing from AVF post HD, surgicell placed, and AC held with improvement.  - Discussed with vascular and no need for AC given DVT as below is chronic   - RPT DOPPLER with again noted DVT in right stented brachial vein, however als noted with significantly elevated flow velocities with turbulence at the anastomotic site. Wall thickening noted at the proximal segment of the outflow vein, which is otherwise patent. Distal segment of the outflow vein appears patent without thrombosis evidence.   - Discussed with vascular and given patent AVF with no HD trouble no need for fistulogram at this time.     # Hyponatremia likely volume down   - Concern for volume issues, however overall appears volume down with serum osmo 307  - Sodium 128 and improved with HD/ bolus during HD.   - Trend sodium with HD for now     # Elevated lactate   - Lactate elevated likely second to volume down?   - Post bolus lactate trending down from 3.3 to 2.7 to 1.3    INFECTIOUS DISEASE  # Candida auris and E. Faecium bacteremia   - WBC rising and low grade fever   - PanCT with worsening LLL atelectasis and BL small PLEFs  - BCx 3/12 with MRSE, candida auris and enterococcus faecium   - BCx 3/13 and 3/14 negative   - TTE 3/13 negative for vegetations   - Optho eval 3/13 without evidence of endophthalmitis  - WOC eval 3/13 with no concern for wound infection  - Prior SCx with  PSA sensitive to zosyn    - Continue on vanco on HD days (3/13, 3/14, 3/17...) and and caspo through 3/26 for 2 week course   - Continue on zosyn (3/12 - 3/18)     # Recurrent LLL PNA   - CXR with LLL mucous plug   - POCUS with LLL consolidation vs atelectasis   - Recent BAL with PSA as below   - FULL RVP negative  - SCx with PSA  - Fevers returned and zosyn (3/3 - 3/11)     # Hemoptysis   - Hemoptysis as above   - BAL with PSA as prior   - No fever and monitor off ABX     # Trachitis vs PNA/ UTI   - Hx of steno and PSA in sputum   - Flu/ COVID negative   - MRSA negative   - UCx with enterococcus   - SCx with  PSA   - Found with leukocytosis and started on cefepime and christiano (2/19) and vanco on HD days (2/21).   - No further steno seen in sputum and continue on cefepime (2/19 - 2/25) and vanco on HD days (2/21, 2/22 - 2/25)   - WBC increased likely reactive to hypoxic event and now improving  - ID following     # PPX   - MRSA PCR negative  - Candida auris PCR POSITIVE  - Continue on isolation precautions per IC    HEME  # AOCD   - Presented for angioplasty and found with anemia to 6.7 s/p 1U PRBC 2/19 and 3/14 and 1/2 PRBC 2/23 and monitoring HH   - Anemia panel with concern for AOCD   - No overt signs of bleeding  - Continue on EPO and Fe     # Heparin resistance?   - PTT persistently low despite increasing heparin GTT   - Resume on heparin GTT and anti Xa level supra therapeutic on but PTT remained low  - Patient ultimately with concern for heparin resistance    VASCULAR  # RUE DVT   - RUE edema noted with age-indeterminate, non-occlusive deep vein thrombosis noted in the right brachial vein.   - Case discussed with vascular who recommends full AC   - CTH from prior with encephalomalcia, however no hx of intracranial bleed   - Prior UGIB while on AC, however discharged on eliquis in 7/2024 and completed 6 month course for RLE DVT   - Continue on heparin GTT, however held for hemoptysis and resistance   - Continued on argatroban with good tolerance and then changed to eliquis   - Case discussed vascular and DVT likely chronic and no need for eliquis     ENDOCRINE  # DM2 A1C 14.0 (1/2024) > 6.4 (2/2025)  - Presented with hyperglycemia and continued on lantus and ISS   - Increased lantus, however FS continues to trend in 200s and changed to NPH with improvement.  - TF interrupted for bronchoscopy and adjustment to bolus feeds and FS dropped.   - NPH stopped and FS improved, however trended back up with continuation of tube feeds.   - NPH resumed, however FS continues to wax and wane and case discussed with endocrine and changed to lantus 12 and lispro 6 with ISS  - Monitor FS and adjust as needed  - Endocrine following     ETHICS/ GOC    - Palliative discussion on 3/5  - Remains FULL CODE     DISPO - Back to NH when able

## 2025-03-18 NOTE — PROGRESS NOTE ADULT - SUBJECTIVE AND OBJECTIVE BOX
Chief Complaint: DM type 2     Interval Events: wife at the bedside. FS stable. Tube feeding ongoing.     MEDICATIONS  (STANDING):  albuterol    0.083% 2.5 milliGRAM(s) Nebulizer every 6 hours  amLODIPine   Tablet 10 milliGRAM(s) Oral daily  AQUAPHOR (petrolatum Ointment) 1 Application(s) Topical two times a day  atorvastatin 20 milliGRAM(s) Oral at bedtime  caspofungin IVPB 50 milliGRAM(s) IV Intermittent every 24 hours  caspofungin IVPB      chlorhexidine 0.12% Liquid 15 milliLiter(s) Oral Mucosa every 12 hours  chlorhexidine 2% Cloths 1 Application(s) Topical daily  dextrose 5%. 1000 milliLiter(s) (100 mL/Hr) IV Continuous <Continuous>  dextrose 5%. 1000 milliLiter(s) (50 mL/Hr) IV Continuous <Continuous>  dextrose 50% Injectable 25 Gram(s) IV Push once  dextrose 50% Injectable 12.5 Gram(s) IV Push once  dextrose 50% Injectable 25 Gram(s) IV Push once  epoetin reilly-epbx (RETACRIT) Injectable 31598 Unit(s) IV Push <User Schedule>  ferrous    sulfate Liquid 300 milliGRAM(s) Enteral Tube daily  glucagon  Injectable 1 milliGRAM(s) IntraMuscular once  heparin   Injectable 5000 Unit(s) SubCutaneous every 12 hours  hydrALAZINE 75 milliGRAM(s) Oral three times a day  insulin glargine Injectable (LANTUS) 12 Unit(s) SubCutaneous at bedtime  insulin lispro (ADMELOG) corrective regimen sliding scale   SubCutaneous every 6 hours  insulin lispro Injectable (ADMELOG) 6 Unit(s) SubCutaneous every 6 hours  labetalol 100 milliGRAM(s) Oral two times a day  Nephro-noam 1 Tablet(s) Oral daily  pantoprazole   Suspension 40 milliGRAM(s) Oral before breakfast  piperacillin/tazobactam IVPB.. 3.375 Gram(s) IV Intermittent <User Schedule>  sodium chloride 0.9% for Nebulization 3 milliLiter(s) Nebulizer every 6 hours    MEDICATIONS  (PRN):  dextrose Oral Gel 15 Gram(s) Oral once PRN Blood Glucose LESS THAN 70 milliGRAM(s)/deciliter  sodium chloride 0.9% Bolus. 100 milliLiter(s) IV Bolus every 5 minutes PRN SBP LESS THAN or EQUAL to 80 mmHg      Allergies    No Known Allergies    Intolerances      Review of Systems:    UNABLE TO OBTAIN    PHYSICAL EXAM:  VITALS: T(C): 36.2 (03-18-25 @ 08:00)  T(F): 97.2 (03-18-25 @ 08:00), Max: 97.6 (03-17-25 @ 17:05)  HR: 58 (03-18-25 @ 10:53) (58 - 77)  BP: 126/57 (03-18-25 @ 08:00) (126/57 - 154/61)  RR:  (15 - 18)  SpO2:  (96% - 100%)  Wt(kg): --  GENERAL: NAD  RESPIRATORY: trache to vent   GI: Soft, non distended  NEURO: A&Ox0       CAPILLARY BLOOD GLUCOSE      POCT Blood Glucose.: 136 mg/dL (18 Mar 2025 12:07)  POCT Blood Glucose.: 134 mg/dL (18 Mar 2025 05:47)  POCT Blood Glucose.: 197 mg/dL (17 Mar 2025 23:25)  POCT Blood Glucose.: 229 mg/dL (17 Mar 2025 17:05)      03-17    130[L]  |  89[L]  |  67[H]  ----------------------------<  151[H]  4.5   |  22  |  3.72[H]    eGFR: 17[L]    Ca    9.6      03-17  Mg     2.80     03-17  Phos  3.3     03-17            Thyroid Function Tests:        A1C with Estimated Average Glucose Result: 6.4 % (02-19-25 @ 06:45)  A1C with Estimated Average Glucose Result: 4.8 % (10-05-24 @ 08:37)  A1C with Estimated Average Glucose Result: 5.9 % (07-16-24 @ 10:25)  A1C with Estimated Average Glucose Result: 6.9 % (04-30-24 @ 06:03)          Diet, NPO with Tube Feed:   Tube Feeding Modality: Gastrostomy  Nepro with Carb Steady (NEPRORTH)  Total Volume for 24 Hours (mL): 1160  Bolus  Total Volume of Bolus (mL):  290  Total # of Feeds: 4  Tube Feed Frequency: Every 6 hours   Tube Feed Start Time: 12:00  Bolus Feed Rate (mL per Hour): 290   Bolus Feed Duration (in Hours): 0  Liquid Protein Supplement     Qty per Day:  2 (03-14-25 @ 14:18)

## 2025-03-19 ENCOUNTER — TRANSCRIPTION ENCOUNTER (OUTPATIENT)
Age: 75
End: 2025-03-19

## 2025-03-19 ENCOUNTER — NON-APPOINTMENT (OUTPATIENT)
Age: 75
End: 2025-03-19

## 2025-03-19 VITALS — HEART RATE: 79 BPM | OXYGEN SATURATION: 100 %

## 2025-03-19 LAB
ADD ON TEST-SPECIMEN IN LAB: SIGNIFICANT CHANGE UP
ANION GAP SERPL CALC-SCNC: 15 MMOL/L — HIGH (ref 7–14)
BASOPHILS # BLD AUTO: 0.06 K/UL — SIGNIFICANT CHANGE UP (ref 0–0.2)
BASOPHILS NFR BLD AUTO: 0.5 % — SIGNIFICANT CHANGE UP (ref 0–2)
BUN SERPL-MCNC: 55 MG/DL — HIGH (ref 7–23)
CA-I BLD-SCNC: 1.24 MMOL/L — SIGNIFICANT CHANGE UP (ref 1.15–1.29)
CHLORIDE SERPL-SCNC: 90 MMOL/L — LOW (ref 98–107)
CO2 SERPL-SCNC: 24 MMOL/L — SIGNIFICANT CHANGE UP (ref 22–31)
CREAT SERPL-MCNC: 3.11 MG/DL — HIGH (ref 0.5–1.3)
CULTURE RESULTS: SIGNIFICANT CHANGE UP
CULTURE RESULTS: SIGNIFICANT CHANGE UP
EOSINOPHIL NFR BLD AUTO: 1.7 % — SIGNIFICANT CHANGE UP (ref 0–6)
GLUCOSE BLDC GLUCOMTR-MCNC: 129 MG/DL — HIGH (ref 70–99)
GLUCOSE BLDC GLUCOMTR-MCNC: 141 MG/DL — HIGH (ref 70–99)
GLUCOSE SERPL-MCNC: 130 MG/DL — HIGH (ref 70–99)
HCT VFR BLD CALC: 25.9 % — LOW (ref 39–50)
HGB BLD-MCNC: 8.3 G/DL — LOW (ref 13–17)
IANC: 9.35 K/UL — HIGH (ref 1.8–7.4)
IMM GRANULOCYTES NFR BLD AUTO: 0.7 % — SIGNIFICANT CHANGE UP (ref 0–0.9)
LYMPHOCYTES # BLD AUTO: 0.94 K/UL — LOW (ref 1–3.3)
LYMPHOCYTES # BLD AUTO: 8.4 % — LOW (ref 13–44)
MAGNESIUM SERPL-MCNC: 2.5 MG/DL — SIGNIFICANT CHANGE UP (ref 1.6–2.6)
MCHC RBC-ENTMCNC: 24.5 PG — LOW (ref 27–34)
MCHC RBC-ENTMCNC: 32 G/DL — SIGNIFICANT CHANGE UP (ref 32–36)
MCV RBC AUTO: 76.4 FL — LOW (ref 80–100)
MONOCYTES NFR BLD AUTO: 4.8 % — SIGNIFICANT CHANGE UP (ref 2–14)
NEUTROPHILS # BLD AUTO: 9.35 K/UL — HIGH (ref 1.8–7.4)
NEUTROPHILS NFR BLD AUTO: 83.9 % — HIGH (ref 43–77)
NRBC # BLD AUTO: 0.08 K/UL — HIGH (ref 0–0)
NRBC # FLD: 0.08 K/UL — HIGH (ref 0–0)
NRBC BLD AUTO-RTO: 0 /100 WBCS — SIGNIFICANT CHANGE UP (ref 0–0)
PHOSPHATE SERPL-MCNC: 3.1 MG/DL — SIGNIFICANT CHANGE UP (ref 2.5–4.5)
PLATELET # BLD AUTO: 274 K/UL — SIGNIFICANT CHANGE UP (ref 150–400)
POTASSIUM SERPL-MCNC: 3.7 MMOL/L — SIGNIFICANT CHANGE UP (ref 3.5–5.3)
POTASSIUM SERPL-SCNC: 3.7 MMOL/L — SIGNIFICANT CHANGE UP (ref 3.5–5.3)
RBC # BLD: 3.39 M/UL — LOW (ref 4.2–5.8)
RBC # FLD: 18.6 % — HIGH (ref 10.3–14.5)
SODIUM SERPL-SCNC: 129 MMOL/L — LOW (ref 135–145)
SPECIMEN SOURCE: SIGNIFICANT CHANGE UP
SPECIMEN SOURCE: SIGNIFICANT CHANGE UP
T4 FREE SERPL-MCNC: 1.4 NG/DL — SIGNIFICANT CHANGE UP (ref 0.9–1.7)
TSH SERPL-MCNC: 6.52 UIU/ML — HIGH (ref 0.27–4.2)
VANCOMYCIN FLD-MCNC: 18.7 UG/ML — SIGNIFICANT CHANGE UP
WBC # FLD AUTO: 11.16 K/UL — HIGH (ref 3.8–10.5)

## 2025-03-19 PROCEDURE — 99232 SBSQ HOSP IP/OBS MODERATE 35: CPT

## 2025-03-19 PROCEDURE — 99223 1ST HOSP IP/OBS HIGH 75: CPT

## 2025-03-19 PROCEDURE — 99233 SBSQ HOSP IP/OBS HIGH 50: CPT

## 2025-03-19 RX ORDER — ALBUTEROL SULFATE 2.5 MG/3ML
3 VIAL, NEBULIZER (ML) INHALATION
Qty: 0 | Refills: 0 | DISCHARGE
Start: 2025-03-19

## 2025-03-19 RX ORDER — EPOETIN ALFA 10000 [IU]/ML
10000 SOLUTION INTRAVENOUS; SUBCUTANEOUS ONCE
Refills: 0 | Status: COMPLETED | OUTPATIENT
Start: 2025-03-19 | End: 2025-03-19

## 2025-03-19 RX ORDER — WHITE PETROLATUM 1 G/G
1 OINTMENT TOPICAL
Qty: 0 | Refills: 0 | DISCHARGE
Start: 2025-03-19

## 2025-03-19 RX ORDER — VANCOMYCIN HCL IN 5 % DEXTROSE 1.5G/250ML
500 PLASTIC BAG, INJECTION (ML) INTRAVENOUS
Qty: 0 | Refills: 0 | DISCHARGE
Start: 2025-03-19

## 2025-03-19 RX ORDER — INSULIN GLARGINE-YFGN 100 [IU]/ML
12 INJECTION, SOLUTION SUBCUTANEOUS
Qty: 0 | Refills: 0 | DISCHARGE
Start: 2025-03-19

## 2025-03-19 RX ORDER — VANCOMYCIN HCL IN 5 % DEXTROSE 1.5G/250ML
500 PLASTIC BAG, INJECTION (ML) INTRAVENOUS ONCE
Refills: 0 | Status: COMPLETED | OUTPATIENT
Start: 2025-03-19 | End: 2025-03-19

## 2025-03-19 RX ORDER — INSULIN LISPRO 100 U/ML
6 INJECTION, SOLUTION INTRAVENOUS; SUBCUTANEOUS
Qty: 0 | Refills: 0 | DISCHARGE
Start: 2025-03-19

## 2025-03-19 RX ORDER — ATORVASTATIN CALCIUM 80 MG/1
1 TABLET, FILM COATED ORAL
Qty: 0 | Refills: 0 | DISCHARGE
Start: 2025-03-19

## 2025-03-19 RX ORDER — INSULIN LISPRO 100 U/ML
1 INJECTION, SOLUTION INTRAVENOUS; SUBCUTANEOUS
Qty: 0 | Refills: 0 | DISCHARGE
Start: 2025-03-19

## 2025-03-19 RX ORDER — CASPOFUNGIN ACETATE 5 MG/ML
50 INJECTION, POWDER, LYOPHILIZED, FOR SOLUTION INTRAVENOUS
Qty: 0 | Refills: 0 | DISCHARGE
Start: 2025-03-19

## 2025-03-19 RX ORDER — EPOETIN ALFA 10000 [IU]/ML
10000 SOLUTION INTRAVENOUS; SUBCUTANEOUS
Qty: 0 | Refills: 0 | DISCHARGE
Start: 2025-03-19

## 2025-03-19 RX ADMIN — Medication 40 MILLIGRAM(S): at 06:16

## 2025-03-19 RX ADMIN — Medication 2.5 MILLIGRAM(S): at 09:16

## 2025-03-19 RX ADMIN — WHITE PETROLATUM 1 APPLICATION(S): 1 OINTMENT TOPICAL at 06:23

## 2025-03-19 RX ADMIN — Medication 1 APPLICATION(S): at 11:24

## 2025-03-19 RX ADMIN — HEPARIN SODIUM 5000 UNIT(S): 1000 INJECTION INTRAVENOUS; SUBCUTANEOUS at 06:17

## 2025-03-19 RX ADMIN — INSULIN LISPRO 6 UNIT(S): 100 INJECTION, SOLUTION INTRAVENOUS; SUBCUTANEOUS at 11:23

## 2025-03-19 RX ADMIN — Medication 1 TABLET(S): at 11:23

## 2025-03-19 RX ADMIN — Medication 2.5 MILLIGRAM(S): at 03:10

## 2025-03-19 RX ADMIN — Medication 15 MILLILITER(S): at 06:16

## 2025-03-19 RX ADMIN — Medication 100 MILLIGRAM(S): at 14:00

## 2025-03-19 RX ADMIN — Medication 75 MILLIGRAM(S): at 13:58

## 2025-03-19 RX ADMIN — EPOETIN ALFA 10000 UNIT(S): 10000 SOLUTION INTRAVENOUS; SUBCUTANEOUS at 08:10

## 2025-03-19 RX ADMIN — CASPOFUNGIN ACETATE 260 MILLIGRAM(S): 5 INJECTION, POWDER, LYOPHILIZED, FOR SOLUTION INTRAVENOUS at 11:23

## 2025-03-19 RX ADMIN — INSULIN LISPRO 6 UNIT(S): 100 INJECTION, SOLUTION INTRAVENOUS; SUBCUTANEOUS at 06:22

## 2025-03-19 RX ADMIN — Medication 300 MILLIGRAM(S): at 11:23

## 2025-03-19 NOTE — DISCHARGE NOTE PROVIDER - HOSPITAL COURSE
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 Patent recently admitted in 6/2024 for left pontine and cerebellar CVA requiring tracheostomy. While at [facility] patient decannulated and passed SS with advanced diet to easy to chew with thin liquids, but complicated COVID requiring transfer to [facility] in 7/2024 and then ultimately discharged home. Per wife patient was doing well at home until 10/2024 when he was found with RLL with concern for aspiration requiring intubation, then tracheostomy, and ultimately discharged to NH with vent dependence in 11/2024.     Patient now represented for RUE fistulogram and angioplasty with vascular, however course complicated by leukocytosis with concern for recurrent trachitis vs PNA and UTI, AOCD and hyperglycemia. Admitted to RCU for further management.     While in RCU, patient found with  PSA trachitis/ PNA and enterococcus faecium UTI. He was treated with a cefepime (2/19 - 2/25). Hyperglycemia corrected with insulin regimen and anemia noted likely second to ESRD without source of bleed and transfused with good tolerance. Course further complicated by RUE brachial DVT and started on AC. Case discussed with Vascular and overall concern for chronic DVT and given no issues with HD and patency of fistula no need for AC or further surgical procedures. Course further complicated by hypoxia with HD with concern for volume down vs PE. Volume removal held and hypoxia improved. AC given empirically with DVT concern, CTA CHEST with no PE, and AC ultimately stopped. Course further complicated by return of fevers and found with PSA LLL PNA and mucous plug and completed zosyn (3/3 - 3/11). Post completion of zosyn low grade fevers noted with worsening leukocytosis and mild oozing from AVF site post HD. Patient found with Candida auris and E. Faecium bacteremia. PanCT with worsening LLL atelectasis and BL small PLEFs. TTE 3/13 negative for vegetations. Optho eval 3/13 without evidence of endophthalmitis. WOC eval 3/13 with no concern for wound infection. BCx ultimately cleared on 3/13 and 3/14, completed zoysyn empirically again for 7 day course from (3/12 - 3/18), and case discussed with ID with caspofungin and vancomycin to complete 2 week course through 3/26. Lastly, concern noted for bradycardia (BPM 55) and labetalol stopped. HR improved. Hypocalcemia noted and corrected with HD. TSH elevated to 6 and ___.     Case discussed with Dr Man and patient medically cleared and stable for discharge. 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 Patent recently admitted in 6/2024 for left pontine and cerebellar CVA requiring tracheostomy. While at [facility] patient decannulated and passed SS with advanced diet to easy to chew with thin liquids, but complicated COVID requiring transfer to [facility] in 7/2024 and then ultimately discharged home. Per wife patient was doing well at home until 10/2024 when he was found with RLL with concern for aspiration requiring intubation, then tracheostomy, and ultimately discharged to NH with vent dependence in 11/2024.     Patient now represented for RUE fistulogram and angioplasty with vascular, however course complicated by leukocytosis with concern for recurrent trachitis vs PNA and UTI, AOCD and hyperglycemia. Admitted to RCU for further management.     While in RCU, patient found with  PSA trachitis/ PNA and enterococcus faecium UTI. He was treated with a cefepime (2/19 - 2/25). Hyperglycemia corrected with insulin regimen and anemia noted likely second to ESRD without source of bleed and transfused with good tolerance. Course further complicated by RUE brachial DVT and started on AC. Case discussed with Vascular and overall concern for chronic DVT and given no issues with HD and patency of fistula no need for AC or further surgical procedures. Course further complicated by hypoxia with HD with concern for volume down vs PE. Volume removal held and hypoxia improved. AC given empirically with DVT concern, CTA CHEST with no PE, and AC ultimately stopped. Course further complicated by return of fevers and found with PSA LLL PNA and mucous plug and completed zosyn (3/3 - 3/11). Post completion of zosyn low grade fevers noted with worsening leukocytosis and mild oozing from AVF site post HD. Patient found with Candida auris and E. Faecium bacteremia. PanCT with worsening LLL atelectasis and BL small PLEFs. TTE 3/13 negative for vegetations. Optho eval 3/13 without evidence of endophthalmitis. WOC eval 3/13 with no concern for wound infection. BCx ultimately cleared on 3/13 and 3/14, completed zoysyn empirically again for 7 day course from (3/12 - 3/18), and case discussed with ID with caspofungin and vancomycin to complete 2 week course through 3/26. Lastly, concern noted for bradycardia (BPM 55) and labetalol stopped. HR improved. Hypocalcemia noted and corrected with HD. TSH elevated to 6, however given improved off labetaolol, more likely euthyroid sick syndrome.     Case discussed with Dr Man and patient medically cleared and stable for discharge.

## 2025-03-19 NOTE — CONSULT NOTE ADULT - CONSULT REASON
Complex decision making in the setting of advanced illness
ESRD
hx of diabetes on tube feeds with hyperglycemia
r/o candida endophthalmitis
Sacrum stage 2 pressure injury and bilateral ischium Unstageable pressure injury
PNA, UTI

## 2025-03-19 NOTE — PROGRESS NOTE ADULT - PROBLEM SELECTOR PROBLEM 1
Type 2 diabetes mellitus with diabetic chronic kidney disease

## 2025-03-19 NOTE — PROGRESS NOTE ADULT - SUBJECTIVE AND OBJECTIVE BOX
[facility]   MD ELIANE BHAGAT MD ANGELA WONG, PA    TEL:  OFFICE: [phone]  From 5pm-7am Answering Service [phone]    -- RENAL FOLLOW UP NOTE ---Date of Service 03-19-25 @ 14:55    Patient is a 74y old  Male who presents with a chief complaint of 2/18/25 was in cath lab earlier today with vascular surgery for fistulogram and noted to have a WBC of 18 and sent to ED and admitted (04 Mar 2025 12:22)      Patient seen and examined at bedside.     VITALS:  T(F): 97 (03-19-25 @ 10:10), Max: 97.8 (03-19-25 @ 06:50)  HR: 79 (03-19-25 @ 11:34)  BP: 138/64 (03-19-25 @ 10:10)  RR: 15 (03-19-25 @ 10:10)  SpO2: 100% (03-19-25 @ 11:34)  Wt(kg): --    03-18 @ 07:01  -  03-19 @ 07:00  --------------------------------------------------------  IN: 1895 mL / OUT: 0 mL / NET: 1895 mL    03-19 @ 07:01  -  03-19 @ 14:55  --------------------------------------------------------  IN: 400 mL / OUT: 2430 mL / NET: -2030 mL          PHYSICAL EXAM:  General: Nonverbal  Neck: +trach  Respiratory: CTAB, no wheezes, rales or rhonchi  Cardiovascular: S1, S2, RRR  Gastrointestinal: +peg  Extremities: No peripheral edema    Hospital Medications:   MEDICATIONS  (STANDING):  albuterol    0.083% 2.5 milliGRAM(s) Nebulizer every 6 hours  amLODIPine   Tablet 10 milliGRAM(s) Oral daily  AQUAPHOR (petrolatum Ointment) 1 Application(s) Topical two times a day  atorvastatin 20 milliGRAM(s) Oral at bedtime  caspofungin IVPB 50 milliGRAM(s) IV Intermittent every 24 hours  caspofungin IVPB      chlorhexidine 0.12% Liquid 15 milliLiter(s) Oral Mucosa every 12 hours  chlorhexidine 2% Cloths 1 Application(s) Topical daily  dextrose 5%. 1000 milliLiter(s) (100 mL/Hr) IV Continuous <Continuous>  dextrose 5%. 1000 milliLiter(s) (50 mL/Hr) IV Continuous <Continuous>  dextrose 50% Injectable 25 Gram(s) IV Push once  dextrose 50% Injectable 12.5 Gram(s) IV Push once  dextrose 50% Injectable 25 Gram(s) IV Push once  epoetin reilly-epbx (RETACRIT) Injectable 87052 Unit(s) IV Push <User Schedule>  ferrous    sulfate Liquid 300 milliGRAM(s) Enteral Tube daily  glucagon  Injectable 1 milliGRAM(s) IntraMuscular once  heparin   Injectable 5000 Unit(s) SubCutaneous every 12 hours  hydrALAZINE 75 milliGRAM(s) Oral three times a day  insulin glargine Injectable (LANTUS) 12 Unit(s) SubCutaneous at bedtime  insulin lispro (ADMELOG) corrective regimen sliding scale   SubCutaneous every 6 hours  insulin lispro Injectable (ADMELOG) 6 Unit(s) SubCutaneous every 6 hours  Nephro-noam 1 Tablet(s) Oral daily  pantoprazole   Suspension 40 milliGRAM(s) Oral before breakfast  sodium chloride 0.9% for Nebulization 3 milliLiter(s) Nebulizer every 6 hours      LABS:  03-19    129[L]  |  90[L]  |  55[H]  ----------------------------<  130[H]  3.7   |  24  |  3.11[H]    Ca    9.6      19 Mar 2025 06:59  Phos  3.1     03-19  Mg     2.50     03-19      Creatinine Trend: 3.11 <--, 2.89 <--, 3.72 <--, 3.12 <--, 2.28 <--, 3.18 <--, 2.38 <--    Phosphorus: 3.1 mg/dL (03-19 @ 06:59)  Phosphorus: 3.2 mg/dL (03-18 @ 18:24)                              8.3    11.16 )-----------( 274      ( 19 Mar 2025 06:59 )             25.9     Urine Studies:  Urinalysis - [03-19-25 @ 06:59]      Color  / Appearance  / SG  / pH       Gluc 130 / Ketone   / Bili  / Urobili        Blood  / Protein  / Leuk Est  / Nitrite       RBC  / WBC  / Hyaline  / Gran  / Sq Epi  / Non Sq Epi  / Bacteria       Iron 46, TIBC 142, %sat 32      [02-19-25 @ 11:39]  Ferritin 3601      [02-19-25 @ 11:39]  PTH -- (Ca --)      [03-09-25 @ 05:20]   25  PTH -- (Ca --)      [05-26-24 @ 01:20]   122  TSH 6.52      [03-19-25 @ 06:59]  Lipid: chol --, , HDL --, LDL --      [10-18-24 @ 06:00]    HBsAb 654.8      [02-19-25 @ 19:05]  HBsAg Nonreact      [02-19-25 @ 19:05]  HBcAb Nonreact      [02-19-25 @ 19:05]  HCV 0.19, Nonreact      [02-19-25 @ 19:05]      RADIOLOGY & ADDITIONAL STUDIES:

## 2025-03-19 NOTE — PROCEDURE NOTE - NSPROCNAME_GEN_A_CORE
Midline Insertion
Bronchoscopy
Peripheral Line Insertion
Midline Insertion
Peripheral Line Insertion

## 2025-03-19 NOTE — PROGRESS NOTE ADULT - NS ATTEND AMEND GEN_ALL_CORE FT
74 y.o. male with ESRD on HD via AVF, HTN, DMII, and recent prolonged hospitalization (4-6/2024) for baclofen toxicity and acute ischemic CVA of the left talya/cerebellum c/b acute hypoxemic and hypercapnic respiratory failure s/p ETT intubation/MV with failure to wean from ventilator s/p tracheostomy with hospital course further complicated by aspiration and B cereus bacteremia and RLE DVT followed by citrobacter PNA, GIB i/s/o AOCD, and fistula stenosis s/p fistulogram 6/4 ultimately transferred to acute rehab, decannulated, and discharged home until 10/2024 when pt returned to OSH with aspiration PNA c/b hypoxemic respiratory failure requiring ETT intubation/MV followed by tracheostomy placement and d/c to LTCF ([facility]) 11/2024 now presenting to [facility] on 2/18/25 for RUE fistulogram/angioplasty with vascular sx but with hospital course c/b concern fo recurrent PNA and uncontrolled hyperglycemia admitted to RCU for further medical management.    Per vascular no further intervention, no need to be on full a/c 2/2 to fistula bleeding. Not tolerating full a/c.   Completed course of abx but now with C. auris fungemia (3/12), MRSE bacteremia and enterococcus bacteremia. Repeat Blood cultures 3/13 NGTD. CT with dense consolidation.   Repeat Wound care eval. TTE without signs of endocarditis. Ophtho eval - no e/o endophthalmitis       Plan     - CVA with recent strokes, c/w statins, unable to tolerate full a/c  - holding BB due to bradycardia   - c/w vent, C/W albuterol and NS nebs. PS as tolerated.   - C/W peg tube feeds  - Bleeding AV fistula, per vascular no acute interventions are planned, Recommending against full a/c 2/2 to bleeding.   - Wound care for pressure ulcers  - Per vascular DVT likely chronic  - HD per renal  - Fevers, s/p cultures now with fungemia, bacteremia. Repeat TTE without signs of endocarditis. Repeat blood cultures 3/13 NGTD. c/w caspo and Zosyn. F/U further ID recs. Monitor levels.   - DVT ppx- SQH  - Dispo- Full code. Prognosis overall is poor. patient can be discharged to his nursing facility

## 2025-03-19 NOTE — PROGRESS NOTE ADULT - SUBJECTIVE AND OBJECTIVE BOX
RCU PROGRESS NOTE     CHIEF COMPLAINT: Patient is a 74y old  Male who presents with a chief complaint of 2/18/25 was in cath lab earlier today with vascular surgery for fistulogram and noted to have a WBC of 18 and sent to ED and admitted (04 Mar 2025 12:22)      INTERVAL EVENTS:  - Porter better with holding labetalol and calcium improvement this morning   - Caspo and vanco x 2 weeks   - DISPO planning    REVIEW OF SYSTEMS: Seen by bedside during AM rounds and unable to assess ROS as vented     Mode: AC/ CMV (Assist Control/ Continuous Mandatory Ventilation), RR (machine): 15, TV (machine): 400, FiO2: 40, PEEP: 6, ITime: 0.8, MAP: 9, PIP: 19      OBJECTIVE:  ICU Vital Signs Last 24 Hrs  T(C): 36.6 (16 Mar 2025 04:00), Max: 36.8 (15 Mar 2025 12:00)  T(F): 97.8 (16 Mar 2025 04:00), Max: 98.3 (15 Mar 2025 12:00)  HR: 69 (16 Mar 2025 04:00) (62 - 70)  BP: 129/51 (16 Mar 2025 04:00) (107/47 - 136/52)  BP(mean): 74 (16 Mar 2025 04:00) (64 - 75)  ABP: --  ABP(mean): --  RR: 16 (16 Mar 2025 04:00) (16 - 17)  SpO2: 100% (16 Mar 2025 04:00) (98% - 100%)    O2 Parameters below as of 16 Mar 2025 08:32  Patient On (Oxygen Delivery Method): ventilator          Mode: AC/ CMV (Assist Control/ Continuous Mandatory Ventilation), RR (machine): 15, TV (machine): 400, FiO2: 40, PEEP: 6, ITime: 0.8, MAP: 9, PIP: 19    03-15 @ 07:01  -  03-16 @ 07:00  --------------------------------------------------------  IN: 1660 mL / OUT: 2400 mL / NET: -740 mL      CAPILLARY BLOOD GLUCOSE  POCT Blood Glucose.: 183 mg/dL (16 Mar 2025 04:59)      HOSPITAL MEDICATIONS:  MEDICATIONS  (STANDING):  albuterol    0.083% 2.5 milliGRAM(s) Nebulizer every 6 hours  amLODIPine   Tablet 10 milliGRAM(s) Oral daily  AQUAPHOR (petrolatum Ointment) 1 Application(s) Topical two times a day  atorvastatin 20 milliGRAM(s) Oral at bedtime  caspofungin IVPB 50 milliGRAM(s) IV Intermittent every 24 hours  caspofungin IVPB      chlorhexidine 0.12% Liquid 15 milliLiter(s) Oral Mucosa every 12 hours  chlorhexidine 2% Cloths 1 Application(s) Topical daily  dextrose 5%. 1000 milliLiter(s) (100 mL/Hr) IV Continuous <Continuous>  dextrose 5%. 1000 milliLiter(s) (50 mL/Hr) IV Continuous <Continuous>  dextrose 50% Injectable 25 Gram(s) IV Push once  dextrose 50% Injectable 12.5 Gram(s) IV Push once  dextrose 50% Injectable 25 Gram(s) IV Push once  epoetin reilly-epbx (RETACRIT) Injectable 98988 Unit(s) IV Push <User Schedule>  ferrous    sulfate Liquid 300 milliGRAM(s) Enteral Tube daily  glucagon  Injectable 1 milliGRAM(s) IntraMuscular once  heparin   Injectable 5000 Unit(s) SubCutaneous every 12 hours  hydrALAZINE 75 milliGRAM(s) Oral three times a day  insulin glargine Injectable (LANTUS) 12 Unit(s) SubCutaneous at bedtime  insulin lispro (ADMELOG) corrective regimen sliding scale   SubCutaneous every 6 hours  insulin lispro Injectable (ADMELOG) 6 Unit(s) SubCutaneous every 6 hours  labetalol 100 milliGRAM(s) Oral two times a day  Nephro-noam 1 Tablet(s) Oral daily  pantoprazole   Suspension 40 milliGRAM(s) Oral before breakfast  piperacillin/tazobactam IVPB.. 3.375 Gram(s) IV Intermittent every 12 hours  sodium chloride 0.9% for Nebulization 3 milliLiter(s) Nebulizer every 6 hours    MEDICATIONS  (PRN):  dextrose Oral Gel 15 Gram(s) Oral once PRN Blood Glucose LESS THAN 70 milliGRAM(s)/deciliter  sodium chloride 0.9% Bolus. 100 milliLiter(s) IV Bolus every 5 minutes PRN SBP LESS THAN or EQUAL to 80 mmHg      PHYSICAL EXAMINATION  General: NAD   HEENT: Trach present  Cards: S1/S2, no murmurs   Pulm: Course vent sounds bilaterally and slightly decreased in LLL. No wheezes.   Abdomen: Soft, nondistended and nontender. PEG (+)   Extremities: No pedal edema. THAI of left upper and lower extremities noted and right hemiparesis per baseline with CVA hx.  Neurology: Awakens with eyes open and follows commands, but quickly closes eyes and nods yes and no to simple questions with no acute focal neurological deficits     LABS:                                             8.9    10.90 )-----------( 271      ( 17 Mar 2025 05:11 )             28.7       03-17    130[L]  |  89[L]  |  67[H]  ----------------------------<  151[H]  4.5   |  22  |  3.72[H]    Ca    9.6      17 Mar 2025 05:11  Phos  3.3     03-17  Mg     2.80     03-17          Urinalysis Basic - ( 16 Mar 2025 08:15 )  Color: x / Appearance: x / SG: x / pH: x  Gluc: 125 mg/dL / Ketone: x  / Bili: x / Urobili: x   Blood: x / Protein: x / Nitrite: x   Leuk Esterase: x / RBC: x / WBC x   Sq Epi: x / Non Sq Epi: x / Bacteria: x            PAST MEDICAL & SURGICAL HISTORY:  ESRD on hemodialysis      HTN (hypertension)      Insulin dependent type 2 diabetes mellitus      History of cerebrovascular accident (CVA) with residual deficit      History of DVT of lower extremity      HLD (hyperlipidemia)      CVA (cerebrovascular accident)      Aphasia      Tracheostomy in place      PEG (percutaneous endoscopic gastrostomy) status      GERD (gastroesophageal reflux disease)      Constipation      Pressure ulcer      Arteriovenous fistula      S/P percutaneous endoscopic gastrostomy (PEG) tube placement      History of tracheostomy          FAMILY HISTORY:  FHx: diabetes mellitus (Father, Aunt)        Social History:      RADIOLOGY:  [ ] Reviewed and interpreted by me    PULMONARY FUNCTION TESTS:    EKG: RCU PROGRESS NOTE     CHIEF COMPLAINT: Patient is a 74y old  Male who presents with a chief complaint of 2/18/25 was in cath lab earlier today with vascular surgery for fistulogram and noted to have a WBC of 18 and sent to ED and admitted (04 Mar 2025 12:22)      INTERVAL EVENTS:  - Porter better off labetalol   - Caspo and vanco x 2 weeks   - DISPO today     REVIEW OF SYSTEMS: Seen by bedside during AM rounds and unable to assess ROS as vented     Mode: AC/ CMV (Assist Control/ Continuous Mandatory Ventilation), RR (machine): 15, TV (machine): 400, FiO2: 40, PEEP: 6, ITime: 0.8, MAP: 9, PIP: 19      OBJECTIVE:  ICU Vital Signs Last 24 Hrs  T(C): 36.6 (16 Mar 2025 04:00), Max: 36.8 (15 Mar 2025 12:00)  T(F): 97.8 (16 Mar 2025 04:00), Max: 98.3 (15 Mar 2025 12:00)  HR: 69 (16 Mar 2025 04:00) (62 - 70)  BP: 129/51 (16 Mar 2025 04:00) (107/47 - 136/52)  BP(mean): 74 (16 Mar 2025 04:00) (64 - 75)  ABP: --  ABP(mean): --  RR: 16 (16 Mar 2025 04:00) (16 - 17)  SpO2: 100% (16 Mar 2025 04:00) (98% - 100%)    O2 Parameters below as of 16 Mar 2025 08:32  Patient On (Oxygen Delivery Method): ventilator          Mode: AC/ CMV (Assist Control/ Continuous Mandatory Ventilation), RR (machine): 15, TV (machine): 400, FiO2: 40, PEEP: 6, ITime: 0.8, MAP: 9, PIP: 19    03-15 @ 07:01  -  03-16 @ 07:00  --------------------------------------------------------  IN: 1660 mL / OUT: 2400 mL / NET: -740 mL      CAPILLARY BLOOD GLUCOSE  POCT Blood Glucose.: 183 mg/dL (16 Mar 2025 04:59)      HOSPITAL MEDICATIONS:  MEDICATIONS  (STANDING):  albuterol    0.083% 2.5 milliGRAM(s) Nebulizer every 6 hours  amLODIPine   Tablet 10 milliGRAM(s) Oral daily  AQUAPHOR (petrolatum Ointment) 1 Application(s) Topical two times a day  atorvastatin 20 milliGRAM(s) Oral at bedtime  caspofungin IVPB 50 milliGRAM(s) IV Intermittent every 24 hours  caspofungin IVPB      chlorhexidine 0.12% Liquid 15 milliLiter(s) Oral Mucosa every 12 hours  chlorhexidine 2% Cloths 1 Application(s) Topical daily  dextrose 5%. 1000 milliLiter(s) (100 mL/Hr) IV Continuous <Continuous>  dextrose 5%. 1000 milliLiter(s) (50 mL/Hr) IV Continuous <Continuous>  dextrose 50% Injectable 25 Gram(s) IV Push once  dextrose 50% Injectable 12.5 Gram(s) IV Push once  dextrose 50% Injectable 25 Gram(s) IV Push once  epoetin reilly-epbx (RETACRIT) Injectable 68180 Unit(s) IV Push <User Schedule>  ferrous    sulfate Liquid 300 milliGRAM(s) Enteral Tube daily  glucagon  Injectable 1 milliGRAM(s) IntraMuscular once  heparin   Injectable 5000 Unit(s) SubCutaneous every 12 hours  hydrALAZINE 75 milliGRAM(s) Oral three times a day  insulin glargine Injectable (LANTUS) 12 Unit(s) SubCutaneous at bedtime  insulin lispro (ADMELOG) corrective regimen sliding scale   SubCutaneous every 6 hours  insulin lispro Injectable (ADMELOG) 6 Unit(s) SubCutaneous every 6 hours  labetalol 100 milliGRAM(s) Oral two times a day  Nephro-noam 1 Tablet(s) Oral daily  pantoprazole   Suspension 40 milliGRAM(s) Oral before breakfast  piperacillin/tazobactam IVPB.. 3.375 Gram(s) IV Intermittent every 12 hours  sodium chloride 0.9% for Nebulization 3 milliLiter(s) Nebulizer every 6 hours    MEDICATIONS  (PRN):  dextrose Oral Gel 15 Gram(s) Oral once PRN Blood Glucose LESS THAN 70 milliGRAM(s)/deciliter  sodium chloride 0.9% Bolus. 100 milliLiter(s) IV Bolus every 5 minutes PRN SBP LESS THAN or EQUAL to 80 mmHg      PHYSICAL EXAMINATION  General: NAD   HEENT: Trach present  Cards: S1/S2, no murmurs   Pulm: Course vent sounds bilaterally and slightly decreased in LLL. No wheezes.   Abdomen: Soft, nondistended and nontender. PEG (+)   Extremities: No pedal edema. THAI of left upper and lower extremities noted and right hemiparesis per baseline with CVA hx.  Neurology: Awakens with eyes open and follows commands, but quickly closes eyes and nods yes and no to simple questions with no acute focal neurological deficits     LABS:                                             8.9    10.90 )-----------( 271      ( 17 Mar 2025 05:11 )             28.7       03-17    130[L]  |  89[L]  |  67[H]  ----------------------------<  151[H]  4.5   |  22  |  3.72[H]    Ca    9.6      17 Mar 2025 05:11  Phos  3.3     03-17  Mg     2.80     03-17          Urinalysis Basic - ( 16 Mar 2025 08:15 )  Color: x / Appearance: x / SG: x / pH: x  Gluc: 125 mg/dL / Ketone: x  / Bili: x / Urobili: x   Blood: x / Protein: x / Nitrite: x   Leuk Esterase: x / RBC: x / WBC x   Sq Epi: x / Non Sq Epi: x / Bacteria: x            PAST MEDICAL & SURGICAL HISTORY:  ESRD on hemodialysis      HTN (hypertension)      Insulin dependent type 2 diabetes mellitus      History of cerebrovascular accident (CVA) with residual deficit      History of DVT of lower extremity      HLD (hyperlipidemia)      CVA (cerebrovascular accident)      Aphasia      Tracheostomy in place      PEG (percutaneous endoscopic gastrostomy) status      GERD (gastroesophageal reflux disease)      Constipation      Pressure ulcer      Arteriovenous fistula      S/P percutaneous endoscopic gastrostomy (PEG) tube placement      History of tracheostomy          FAMILY HISTORY:  FHx: diabetes mellitus (Father, Aunt)        Social History:      RADIOLOGY:  [ ] Reviewed and interpreted by me    PULMONARY FUNCTION TESTS:    EKG:

## 2025-03-19 NOTE — CONSULT NOTE ADULT - NS ATTEND AMEND GEN_ALL_CORE FT
c aurius, on vent with trach, agree with above  dti on ankle, viable toes/ palp pulses bilat- offload
as above
74M trach, PEG on bolus feeds, DM2 on insulin.  Adjust to basal insulin daily plus pre-bolus rapid acting insulin q6h.  On discharge ensure bolus insulin given before all boluses.  Will follow.    Lucrecia Sharma MD  Division of Endocrinology  Pager: [phone]    If after 6PM or before 9AM, or on weekends/holidays, please call endocrine answering service for assistance ([phone]).  For nonurgent matters email [email or web address] for assistance.

## 2025-03-19 NOTE — PROGRESS NOTE ADULT - PROBLEM SELECTOR PROBLEM 4
ESRD on dialysis

## 2025-03-19 NOTE — CONSULT NOTE ADULT - ASSESSMENT
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 Patent recently admitted in 6/2024 for left pontine and cerebellar CVA requiring tracheostomy. While at [facility] patient decannulated and passed SS with advanced diet to easy to chew with thin liquids, but complicated COVID requiring transfer to [facility] in 7/2024 and then ultimately discharged home. Per wife patient was doing well at home until 10/2024 when he was found with RLL with concern for aspiration requiring intubation, then tracheostomy, and ultimately discharged to NH with vent dependence in 11/2024. Patient now presented for RUE fistulogram and angioplasty with vascular, however course complicated by leukocytosis with concern for recurrent trachitis vs PNA and UTI, AOCD and hyperglycemia. Admitted to RCU for further management. Found with  PSA trachitis/ PNA and enterococcus faecium UTI. Course complicated by RUE brachial DVT and hypoxia with HD with concern for volume down vs PE? Volume removal held and hypoxia improved. AC given empirically and CTA CHEST with no PE. Case discussed with vascular and since brachial DVT appears old no need for chronic AC . Course further complicated by PSA LLL PNA and completed ABX followed by low grade fever and worsening leukocytosis. Found with MRSE, Candida auris and E. Faecium bacteremia and likely recurrent  PNA. ID following.     Wound Consult requested to assist w/ management of Sacrum stage 2 pressure injury and bilateral ischium Unstageable pressure injury.   Of note, per flowsheet patient admitted with sacrum stage 2 pressure injury and bilateral ischium stage 2 pressure injury and per records deteriorated 2/2 critical illness (skin failure markers; sepsis, mechanical ventilation >72 hours, severe protein calorie malnutrition):                    Nutrition Consult for optimization as tolerated in pt w/ Protein Calorie Malnutrition Inadequate PO intake. Consider MVI & Vit C to promote wound healing      Continue turning and positioning w/ offloading assistive devices as per protocol  Continue w/ Pericare as per protocol  Continue w/ low air loss fluidized bed surface     Care as per medicine, will follow w/ you periodically during hopsital stay. please reconsult earlier if needed     Upon discharge f/u as outpatient at [facility] [address] [facility] [phone]  Seen w/ wound care attng today and D/w team     Thank you for this consult  Alix Mustafa, BIJU-BC, MARCIA (pager #[phone] or available in MS teams)    If after 4PM or before 7:30AM on Mon-Friday or weekend/holiday please contact general surgery for urgent matters.   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 Patent recently admitted in 6/2024 for left pontine and cerebellar CVA requiring tracheostomy. While at [facility] patient decannulated and passed SS with advanced diet to easy to chew with thin liquids, but complicated COVID requiring transfer to [facility] in 7/2024 and then ultimately discharged home. Per wife patient was doing well at home until 10/2024 when he was found with RLL with concern for aspiration requiring intubation, then tracheostomy, and ultimately discharged to NH with vent dependence in 11/2024. Patient now presented for RUE fistulogram and angioplasty with vascular, however course complicated by leukocytosis with concern for recurrent trachitis vs PNA and UTI, AOCD and hyperglycemia. Admitted to RCU for further management. Found with  PSA trachitis/ PNA and enterococcus faecium UTI. Course complicated by RUE brachial DVT and hypoxia with HD with concern for volume down vs PE? Volume removal held and hypoxia improved. AC given empirically and CTA CHEST with no PE. Case discussed with vascular and since brachial DVT appears old no need for chronic AC . Course further complicated by PSA LLL PNA and completed ABX followed by low grade fever and worsening leukocytosis, (+ anemia requiring packed red cells). Found with MRSE, Candida auris and E. Faecium bacteremia and likely recurrent  PNA. ID following. Palliative team consulted for complex decision making in the setting of advanced illness, patient remains full code.     Wound Consult requested to assist w/ management of Sacrum stage 2 pressure injury and bilateral ischium Unstageable pressure injury.   Of note, per flow sheet patient admitted with sacrum stage 2 pressure injury and bilateral ischium stage 2 pressure injury and per records bilateral ischium deteriorated exposing necrotic tissue 2/2 critical illness (skin failure markers; anemia on 2/23 requiring packed red cells, sepsis, mechanical ventilation >72 hours, severe protein calorie malnutrition)  -Overall wounds stable/improving   -Continue use of TRIAD barrier paste/hydrophilic dressing and Medihoney gel to bilateral ischium (autolytic debridement)  -No sharp debridement indicated as remaining slough adherent, risks for bleeding outweighs benefits   -3/13/25: CT of the ABD and pelvis: Multifocal pneumonia. Fluid and debris in the trachea and bronchi, left   greater than right. Aspiration is a consideration.Small bilateral pleural effusions.Small bubble of subcutaneous air in the left anterior thigh. Correlate   for history of recent subcutaneous injection.Enlarged prostate with chronic changes of bladder outlet obstruction.   Correlate with urinalysis if cystitis is of concern.Infiltration of the soft tissues posterior to the bilateral ischia and right greater trochanter, without evidence of underlying drainable fluid collection. Correlate for decubitus changes. Right greater trochanter with area of hyperpigmentation, no open wounds.   -Left lateral heel area of purple maroon discoloration (deep tissue pressure injury), no palpable tissue type changes   -Appreciate nutrition following for malnutrition, patient receiving liquid protein supplement     Topical recommendations:   .Tracheostomy: Cleanse around trach site with NS. Pat dry. Apply Silicone foam dressing without border beneath trach collar, cut mid dressing to "Y" shape. Change foam dressing every shift and prn. Change trach ties every 3 days.   .PEG: Cleanse q shift with NS, apply liquid barrier film beneath jalen disc.  If redness noted under jalen disc bumper apply thin foam  dressing without border (Mepilex Lite)- cut to mid dressing with a 'Y' shape.   Secondary securement to abdomen taping in 'H' fashion with Steri-strips.   .left lateral heel: Apply liquid barrier film twice a day  .Offload heels per hospital protocol   .Sacrum: Cleanse with skin cleanser, pat dry. Apply TRIAD barrier paste twice a day and PRN with incontinent episodes. With episodes of incontinence only remove soiled layer of Triad, then reinforce with thin layer.   .Bilateral ischium: Cleanse with NS, pat dry. Apply Liquid barrier film to periwound skin (allow to dry). Apply Medihoney gel to wound bed, cover with silicone foam with border. Change daily and PRN if soiled.   .While inpatient, continue low air loss bed therapy, continue heel elevation, continue to turn & reposition per protocol, soft pillow between bony prominences, continue moisture management with barrier creams & single breathable pad, continue measures to decrease friction/shear/pressure. Continue with nutritional support as per dietary/orders.     Care as per medicine, will follow w/ you periodically during hopsital stay. please reconsult earlier if needed     Upon discharge f/u as outpatient at [facility] [address] [phone]  Seen w/ wound care attng Eliud tiwari  today and D/w team RITA SOLIMAN     Thank you for this consult  Alix Mustafa, BIJU-BC, CWKAMILA (pager #[phone] or available in MS teams)    If after 4PM or before 7:30AM on Mon-Friday or weekend/holiday please contact general surgery for urgent matters.   Team A- [id number]   Team B- [id number]  For non-urgent matters e-mail [email or web address]    We spent 75 minutes face to face with this patient of which more than 50% of the time was spent counseling & coordinating care of this pt

## 2025-03-19 NOTE — PROGRESS NOTE ADULT - ASSESSMENT
75 yo M with hx trach/vent (last changed 10/23/24), CVA x2 with residual R hemiplegia, COPD, ESRD on HD MWF (last 2/17), pressure ulcer presenting to [facility] ED for leukocytosis. Patient was initially up in the cath lab earlier today with vascular surgery for fistulogram and noted to have a WBC of 18 and sent to ED. Patient nonverbal at baseline, history provided by son. Per wife, patient at normal baseline, somewhat able to make needs known and is not complaining of any pain. Endocrinology was consulted for management of diabetes mellitus type 2 - patient on TF with hyperglycemia.     Type 2 Diabetes Mellitus  - HbA1c 6.4   - home regimen: on basal/bolus and on TF outpatient - as per wife, he was on humalog 2 units TID and flextouch pen (tresiba?) 6 units at bedtime   - from [facility] - please confirm what [facility] has been doing with regards to pt insulin regimen.      Inpatient plan   - FS goal 100-180 mg/dl: FS stable.   - On bolus tube feeds 290 mL every 6 hours  - continue Lantus 12 units at bedtime  - Continue Admelog 6 units every 6 hours.  Hold if tube feeds are on hold.  -Please change abx to be in NS instead of dextrose.   - continue low Admelog correctional scale q6  - FS q6  - Please update endocrine if any changes to tube feeding    Discharge plan  - If on bolus feeds on discharge, continue basal/bolus regimen, doses TBD.    Bradycardia  Yesterday pt HR 50-60 in evening- pt was on labetalol- primary team sent a TSH and it was 6---added on free T4- result still  Labetalol being held and since HR has improved  f/u Free T4 result..........    Hypertension  - BP goal <130/80  - continue norvasc, labetalol and hydralazine  - Management as per primary team    Hyperlipidemia  - LDL goal <70   - continue statin   - management per primary team     d/w RITA Au.     EMILY Greene-BC  Nurse Practitioner  Division of Endocrinology & Diabetes  Available via [employer] Teams  73 yo M with hx trach/vent (last changed 10/23/24), CVA x2 with residual R hemiplegia, COPD, ESRD on HD MWF (last 2/17), pressure ulcer presenting to [facility] ED for leukocytosis. Patient was initially up in the cath lab earlier today with vascular surgery for fistulogram and noted to have a WBC of 18 and sent to ED. Patient nonverbal at baseline, history provided by son. Per wife, patient at normal baseline, somewhat able to make needs known and is not complaining of any pain. Endocrinology was consulted for management of diabetes mellitus type 2 - patient on TF with hyperglycemia.     Type 2 Diabetes Mellitus  - HbA1c 6.4   - home regimen: on basal/bolus and on TF outpatient - as per wife, he was on humalog 2 units TID and flextouch pen (tresiba?) 6 units at bedtime   - from [facility] - please confirm what [facility] has been doing with regards to pt insulin regimen.      Inpatient plan   - FS goal 100-180 mg/dl: FS stable.   - On bolus tube feeds 290 mL every 6 hours  - continue Lantus 12 units at bedtime  - Continue Admelog 6 units every 6 hours.  Hold if tube feeds are on hold.  -Please change abx to be in NS instead of dextrose.   - continue low Admelog correctional scale q6  - FS q6  - Please update endocrine if any changes to tube feeding    Discharge plan  - If on bolus feeds on discharge, continue basal/bolus regimen doses.     Bradycardia  Yesterday pt HR 50-60 in evening- pt was on labetalol- primary team sent a TSH and it was 6---added on free T4- result still  Labetalol being held and since HR has improved  f/u Free T4 result..........    Hypertension  - BP goal <130/80  - continue norvasc, labetalol and hydralazine  - Management as per primary team    Hyperlipidemia  - LDL goal <70   - continue statin   - management per primary team     d/w RITA Au.     EMILY Greene-BC  Nurse Practitioner  Division of Endocrinology & Diabetes  Available via [employer] Teams

## 2025-03-19 NOTE — PROGRESS NOTE ADULT - ASSESSMENT
74-year-old male hxt consulted for dialysis needs    ESRD:  from [facility]  On HD MTTsF via an A-V fistula. s/p fistulogram by vascular 2/18  Continue MWF in hospital  Prolonged bleeding post HD vascular on board no intervention  hd 3/17 tolerated 2.5L. hd today  consent from wife in dialysis unit.  monitor bmp    Hypertension  BP better  continue norvasc 10 daily and hydralazine increased to 50 tid 3/2  C/W hydralazine 75mg TID.  max uf as tolerated  monitor BP    Anemia:  s/p 1 unit PRBC 2/23  epo with hd  iron sat >20  Given 1 unit of PRBC again today.  monitor Hgb  Transfuse for Hg < 7.    leukocytosis  sepsis work up per team  On Zosyn and now vancomycin.  Monitor trough level.    hyponatremia  in setting of esrd and hyperglycemia  optimize dm control  hd per schedule with UF  monitor Na    Hypokalemia  better  Monitor K.    Hypermagnesemia  HD per schedule.  Monitor Mg.    CKD - MBD:  PTH 25 - low for CKD stage.  Received PhosNaK x1 on 3/8.  PO4 improved.  Monitor PO4 and Ca daily.

## 2025-03-19 NOTE — DISCHARGE NOTE NURSING/CASE MANAGEMENT/SOCIAL WORK - PATIENT PORTAL LINK FT
You can access the FollowMyHealth Patient Portal offered by [facility] by registering at the following website: [email or web address]. By joining [facility]’s FollowMyHealth portal, you will also be able to view your health information using other applications (apps) compatible with our system.

## 2025-03-19 NOTE — CONSULT NOTE ADULT - CONSULT REQUESTED DATE/TIME
19-Mar-2025 11:40
20-Feb-2025 13:41
18-Feb-2025 16:45
13-Mar-2025 16:35
04-Mar-2025 11:00
03-Mar-2025 14:00

## 2025-03-19 NOTE — DISCHARGE NOTE PROVIDER - INSTRUCTIONS
Nepro with carb steady with bolus feeds of 290cc every 6 hours and liquid protein supplement two times a day

## 2025-03-19 NOTE — CONSULT NOTE ADULT - SUBJECTIVE AND OBJECTIVE BOX
Wound SURGERY CONSULT NOTE    HPI: 75 yo M with hx trach/vent (last changed 10/23/24), CVA x2 with residual R hemiplegia, COPD, ESRD on HD MWF (last 2/17), pressure ulcer presenting to [facility] ED for leukocytosis. Patient was intially up in the cath lab earlier today with vascular surgery for fistulogram and noted to have a WBC of 18 and sent to ED. Patient nonverbal at baseline, history provided by son. Per son, patient at normal baseline, somewhat able to make needs known and is not complaining of any pain. ED course:Initial vitals T 37.1, HR 74, /68, SpO2 100%. Labs notable for WBC 18, RBC 7.3 (baseline 7.2), , Na 132, K 3.2, bicarb 30, Cr 3.08 (baseline ~3), Alk phos 260, normal LFTs, VBG with pH 7.34, pCO2 65, UA with large leuk esterase, moderate blood, > 6k WBCs, and many bacteria, RVP negative. Patient to be admitted to RCU as he is trach/vented in setting of leukocytosis 2/2 likely UTI with plan for further evaluation/management (18 Feb 2025 18:00)    Wound consult requested to assist w/ management of Sacrum stage 2 pressure injury and bilateral ischium Unstageable pressure injury. Patient was seen by wound care nurse on 2/27 for sacrum stage 2 pressure injury and Unstageable pressure injuries to bilateral ischium. Patient unable to provide subjective information due to mental status, patient alert, not following commands at the time of assessment.       Current Diet: Diet, NPO with Tube Feed:   Tube Feeding Modality: Gastrostomy  Nepro with Carb Steady (NEPRORTH)  Total Volume for 24 Hours (mL): 1160  Bolus  Total Volume of Bolus (mL):  290  Total # of Feeds: 4  Tube Feed Frequency: Every 6 hours   Tube Feed Start Time: 12:00  Bolus Feed Rate (mL per Hour): 290   Bolus Feed Duration (in Hours): 0  Liquid Protein Supplement     Qty per Day:  2 (03-14-25 @ 14:18)    PAST MEDICAL & SURGICAL HISTORY:  ESRD on hemodialysis      HTN (hypertension)      Insulin dependent type 2 diabetes mellitus      History of cerebrovascular accident (CVA) with residual deficit      History of DVT of lower extremity      HLD (hyperlipidemia)      CVA (cerebrovascular accident)      Aphasia      Tracheostomy in place      PEG (percutaneous endoscopic gastrostomy) status      GERD (gastroesophageal reflux disease)      Constipation      Pressure ulcer      Arteriovenous fistula    S/P percutaneous endoscopic gastrostomy (PEG) tube placement    History of tracheostomy    REVIEW OF SYSTEMS: Patient unable to provide subjective information     MEDICATIONS  (STANDING):  albuterol    0.083% 2.5 milliGRAM(s) Nebulizer every 6 hours  amLODIPine   Tablet 10 milliGRAM(s) Oral daily  AQUAPHOR (petrolatum Ointment) 1 Application(s) Topical two times a day  atorvastatin 20 milliGRAM(s) Oral at bedtime  caspofungin IVPB 50 milliGRAM(s) IV Intermittent every 24 hours  caspofungin IVPB      chlorhexidine 0.12% Liquid 15 milliLiter(s) Oral Mucosa every 12 hours  chlorhexidine 2% Cloths 1 Application(s) Topical daily  dextrose 5%. 1000 milliLiter(s) (100 mL/Hr) IV Continuous <Continuous>  dextrose 5%. 1000 milliLiter(s) (50 mL/Hr) IV Continuous <Continuous>  dextrose 50% Injectable 25 Gram(s) IV Push once  dextrose 50% Injectable 12.5 Gram(s) IV Push once  dextrose 50% Injectable 25 Gram(s) IV Push once  epoetin reilly-epbx (RETACRIT) Injectable 24622 Unit(s) IV Push <User Schedule>  ferrous    sulfate Liquid 300 milliGRAM(s) Enteral Tube daily  glucagon  Injectable 1 milliGRAM(s) IntraMuscular once  heparin   Injectable 5000 Unit(s) SubCutaneous every 12 hours  hydrALAZINE 75 milliGRAM(s) Oral three times a day  insulin glargine Injectable (LANTUS) 12 Unit(s) SubCutaneous at bedtime  insulin lispro (ADMELOG) corrective regimen sliding scale   SubCutaneous every 6 hours  insulin lispro Injectable (ADMELOG) 6 Unit(s) SubCutaneous every 6 hours  Nephro-noam 1 Tablet(s) Oral daily  pantoprazole   Suspension 40 milliGRAM(s) Oral before breakfast  sodium chloride 0.9% for Nebulization 3 milliLiter(s) Nebulizer every 6 hours  vancomycin  IVPB 500 milliGRAM(s) IV Intermittent once    MEDICATIONS  (PRN):  dextrose Oral Gel 15 Gram(s) Oral once PRN Blood Glucose LESS THAN 70 milliGRAM(s)/deciliter  sodium chloride 0.9% Bolus. 100 milliLiter(s) IV Bolus every 5 minutes PRN SBP LESS THAN or EQUAL to 80 mmHg    Allergies    No Known Allergies    Intolerances    SOCIAL HISTORY: Resides at [facility] for  Nursing and Rehabilitation, Patient needs assistance with all ADLs.     FAMILY HISTORY:  FHx: diabetes mellitus (Father, Aunt)    PHYSICAL EXAM:  Vital Signs Last 24 Hrs  T(C): 36.1 (19 Mar 2025 10:10), Max: 36.6 (19 Mar 2025 06:50)  T(F): 97 (19 Mar 2025 10:10), Max: 97.8 (19 Mar 2025 06:50)  HR: 79 (19 Mar 2025 11:34) (57 - 79)  BP: 138/64 (19 Mar 2025 10:10) (127/65 - 154/60)  BP(mean): 85 (19 Mar 2025 04:00) (82 - 85)  RR: 15 (19 Mar 2025 10:10) (15 - 17)  SpO2: 100% (19 Mar 2025 11:34) (100% - 100%)    Parameters below as of 19 Mar 2025 10:10  Patient On (Oxygen Delivery Method): ventilator    Weight (kg): 53.8 (19 Feb 2025 12:20)  BMI (kg/m2): 20.3 (19 Feb 2025 12:20)    Constitutional: NAD/Eyes open, not following commands.   cachectic/ frail/muscle wasting   (+) low airloss support surface, (+) fluidized positioning devices, (+) complete cair boots  HEENT:  NC/AT, non icteric, mucosa moist, vent with increase clear secretions, primary RN called in to suction   Cardiovascular: rate regular   Respiratory: Vent, peritubular skin intact   Gastrointestinal soft NT/ND, fecal incontinence of pasty stool, incontinence care provided   : on HD   Neurology: nonverbal, no follow commands/ functional paraplegic  Musculoskeletal: limited aROM, Left knee contracted, no deformities  Vascular: RUE fistula, (+) thrill   BLE equally warm, no cyanosis, clubbing, edema  DP (+2) pulses palpable  no overt  ischemia noted  Left lateral heel non purple maroon discoloration   -1cmx1.8qfn9do   -No tissue type changes palpable   -Periwound skin intact   Skin:    Right trochanter hyperpigmentation, no palpable tissue type changes   Sacrum healing stage 2 pressure injury   -Tissue type exposing 100% pink moist dermis   -Open wound measuring 0.5cmx0.5cmx0.1cm   -Moist base, no drainage, no odor   Left ischium Unstageable pressure injury   -1.4zfl0ylo1.3cm  -Mixed tissue approx 35% yellow slough centrally obscuring depth od wound and remaining pink moist granular tissue    -Scant serosanguinous drainage, no odor   Right ischium Unstageable pressure injury   -1cmx1.3cmx0.1cm   -Tissue type with 90% yellow/gray/tan eschar and remaining pink moist dermis   -Moist base, no drainage, no fluctuance   All wound periwound skin with re-epithelization at wound edges. No increased warmth, no erythema, no induration     LABS/ CULTURES/ RADIOLOGY:                        8.3    11.16 )-----------( 274      ( 19 Mar 2025 06:59 )             25.9       129  |  90  |  55  ----------------------------<  130      [03-19-25 @ 06:59]  3.7   |  24  |  3.11        Ca     9.6     [03-19-25 @ 06:59]      iCa    1.24     [03-19 @ 06:59]      Mg     2.50     [03-19-25 @ 06:59]      Phos  3.1     [03-19-25 @ 06:59]      Culture - Blood (collected 03-14-25 @ 10:15)  Source: Blood Dialysis Catheter  Preliminary Report (03-18-25 @ 17:00):    No growth at 4 days    Culture - Blood (collected 03-14-25 @ 09:45)  Source: Blood Dialysis Catheter  Preliminary Report (03-18-25 @ 17:00):    No growth at 4 days    Culture - Blood (collected 03-13-25 @ 08:51)  Source: Blood Blood-Peripheral  Final Report (03-18-25 @ 13:00):    No growth at 5 days    Culture - Blood (collected 03-13-25 @ 08:30)  Source: Blood Blood-Peripheral  Final Report (03-18-25 @ 13:00):    No growth at 5 days                       Wound SURGERY CONSULT NOTE    HPI: 73 yo M with hx trach/vent (last changed 10/23/24), CVA x2 with residual R hemiplegia, COPD, ESRD on HD MWF (last 2/17), pressure ulcer presenting to [facility] ED for leukocytosis. Patient was intially up in the cath lab earlier today with vascular surgery for fistulogram and noted to have a WBC of 18 and sent to ED. Patient nonverbal at baseline, history provided by son. Per son, patient at normal baseline, somewhat able to make needs known and is not complaining of any pain. ED course:Initial vitals T 37.1, HR 74, /68, SpO2 100%. Labs notable for WBC 18, RBC 7.3 (baseline 7.2), , Na 132, K 3.2, bicarb 30, Cr 3.08 (baseline ~3), Alk phos 260, normal LFTs, VBG with pH 7.34, pCO2 65, UA with large leuk esterase, moderate blood, > 6k WBCs, and many bacteria, RVP negative. Patient to be admitted to RCU as he is trach/vented in setting of leukocytosis 2/2 likely UTI with plan for further evaluation/management (18 Feb 2025 18:00)    Wound consult requested to assist w/ management of Sacrum stage 2 pressure injury and bilateral ischium Unstageable pressure injury. Patient was seen by wound care nurse on 2/27 for sacrum stage 2 pressure injury and Unstageable pressure injuries to bilateral ischium. Patient unable to provide subjective information due to mental status, patient alert, not following commands at the time of assessment.       Current Diet: Diet, NPO with Tube Feed:   Tube Feeding Modality: Gastrostomy  Nepro with Carb Steady (NEPRORTH)  Total Volume for 24 Hours (mL): 1160  Bolus  Total Volume of Bolus (mL):  290  Total # of Feeds: 4  Tube Feed Frequency: Every 6 hours   Tube Feed Start Time: 12:00  Bolus Feed Rate (mL per Hour): 290   Bolus Feed Duration (in Hours): 0  Liquid Protein Supplement     Qty per Day:  2 (03-14-25 @ 14:18)    PAST MEDICAL & SURGICAL HISTORY:  ESRD on hemodialysis      HTN (hypertension)      Insulin dependent type 2 diabetes mellitus      History of cerebrovascular accident (CVA) with residual deficit      History of DVT of lower extremity      HLD (hyperlipidemia)      CVA (cerebrovascular accident)      Aphasia      Tracheostomy in place      PEG (percutaneous endoscopic gastrostomy) status      GERD (gastroesophageal reflux disease)      Constipation      Pressure ulcer      Arteriovenous fistula    S/P percutaneous endoscopic gastrostomy (PEG) tube placement    History of tracheostomy    REVIEW OF SYSTEMS: Patient unable to provide subjective information     MEDICATIONS  (STANDING):  albuterol    0.083% 2.5 milliGRAM(s) Nebulizer every 6 hours  amLODIPine   Tablet 10 milliGRAM(s) Oral daily  AQUAPHOR (petrolatum Ointment) 1 Application(s) Topical two times a day  atorvastatin 20 milliGRAM(s) Oral at bedtime  caspofungin IVPB 50 milliGRAM(s) IV Intermittent every 24 hours  caspofungin IVPB      chlorhexidine 0.12% Liquid 15 milliLiter(s) Oral Mucosa every 12 hours  chlorhexidine 2% Cloths 1 Application(s) Topical daily  dextrose 5%. 1000 milliLiter(s) (100 mL/Hr) IV Continuous <Continuous>  dextrose 5%. 1000 milliLiter(s) (50 mL/Hr) IV Continuous <Continuous>  dextrose 50% Injectable 25 Gram(s) IV Push once  dextrose 50% Injectable 12.5 Gram(s) IV Push once  dextrose 50% Injectable 25 Gram(s) IV Push once  epoetin reilly-epbx (RETACRIT) Injectable 48712 Unit(s) IV Push <User Schedule>  ferrous    sulfate Liquid 300 milliGRAM(s) Enteral Tube daily  glucagon  Injectable 1 milliGRAM(s) IntraMuscular once  heparin   Injectable 5000 Unit(s) SubCutaneous every 12 hours  hydrALAZINE 75 milliGRAM(s) Oral three times a day  insulin glargine Injectable (LANTUS) 12 Unit(s) SubCutaneous at bedtime  insulin lispro (ADMELOG) corrective regimen sliding scale   SubCutaneous every 6 hours  insulin lispro Injectable (ADMELOG) 6 Unit(s) SubCutaneous every 6 hours  Nephro-noam 1 Tablet(s) Oral daily  pantoprazole   Suspension 40 milliGRAM(s) Oral before breakfast  sodium chloride 0.9% for Nebulization 3 milliLiter(s) Nebulizer every 6 hours  vancomycin  IVPB 500 milliGRAM(s) IV Intermittent once    MEDICATIONS  (PRN):  dextrose Oral Gel 15 Gram(s) Oral once PRN Blood Glucose LESS THAN 70 milliGRAM(s)/deciliter  sodium chloride 0.9% Bolus. 100 milliLiter(s) IV Bolus every 5 minutes PRN SBP LESS THAN or EQUAL to 80 mmHg    Allergies    No Known Allergies    Intolerances    SOCIAL HISTORY: Resides at [facility] for  Nursing and Rehabilitation, per records patient is , Patient needs assistance with all ADLs.     FAMILY HISTORY:  FHx: diabetes mellitus (Father, Aunt)    PHYSICAL EXAM:  Vital Signs Last 24 Hrs  T(C): 36.1 (19 Mar 2025 10:10), Max: 36.6 (19 Mar 2025 06:50)  T(F): 97 (19 Mar 2025 10:10), Max: 97.8 (19 Mar 2025 06:50)  HR: 79 (19 Mar 2025 11:34) (57 - 79)  BP: 138/64 (19 Mar 2025 10:10) (127/65 - 154/60)  BP(mean): 85 (19 Mar 2025 04:00) (82 - 85)  RR: 15 (19 Mar 2025 10:10) (15 - 17)  SpO2: 100% (19 Mar 2025 11:34) (100% - 100%)    Parameters below as of 19 Mar 2025 10:10  Patient On (Oxygen Delivery Method): ventilator    Weight (kg): 53.8 (19 Feb 2025 12:20)  BMI (kg/m2): 20.3 (19 Feb 2025 12:20)    Constitutional: NAD/Eyes open, not following commands.   cachectic/ frail/muscle wasting   (+) low airloss support surface, (+) fluidized positioning devices, (+) complete cair boots  HEENT:  NC/AT, non icteric, mucosa moist, vent with increase clear secretions, primary RN called in to suction   Cardiovascular: rate regular   Respiratory: Vent, peritubular skin intact   Gastrointestinal soft NT/ND, fecal incontinence of pasty stool, incontinence care provided   : on HD   Neurology: nonverbal, no follow commands/ functional paraplegic  Musculoskeletal: limited aROM, Left knee contracted, no deformities  Vascular: RUE fistula, (+) thrill   BLE equally warm, no cyanosis, clubbing, edema  DP (+2) pulses palpable  no overt  ischemia noted  Left lateral heel non purple maroon discoloration (new finding)  -1cmx1.6eym6vg   -No tissue type changes palpable   -Periwound skin intact   Skin:    Right trochanter hyperpigmentation, no palpable tissue type changes   Sacrum healing stage 2 pressure injury   -Tissue type exposing 100% pink moist dermis (prev 2.4yjf4cdb7.2cm)   -Open wound measuring 0.5cmx0.5cmx0.1cm (prev   -Moist base, no drainage, no odor   Left ischium Unstageable pressure injury   -1.7tip7xon5.3cm (2.7mrw4nwz1.3cm)  -Mixed tissue approx 35% yellow slough centrally obscuring depth od wound and remaining pink moist granular tissue    -Scant serosanguinous drainage, no odor   Right ischium Unstageable pressure injury   -1cmx1.3cmx0.1cm (prev 1.5cmx1.5cmx0.2cm)  -Tissue type with 90% yellow/gray/tan eschar and remaining pink moist dermis   -Moist base, no drainage, no fluctuance   All wound periwound skin with re-epithelization at wound edges. No increased warmth, no erythema, no induration     LABS/ CULTURES/ RADIOLOGY:                        8.3    11.16 )-----------( 274      ( 19 Mar 2025 06:59 )             25.9       129  |  90  |  55  ----------------------------<  130      [03-19-25 @ 06:59]  3.7   |  24  |  3.11        Ca     9.6     [03-19-25 @ 06:59]      iCa    1.24     [03-19 @ 06:59]      Mg     2.50     [03-19-25 @ 06:59]      Phos  3.1     [03-19-25 @ 06:59]      Culture - Blood (collected 03-14-25 @ 10:15)  Source: Blood Dialysis Catheter  Preliminary Report (03-18-25 @ 17:00):    No growth at 4 days    Culture - Blood (collected 03-14-25 @ 09:45)  Source: Blood Dialysis Catheter  Preliminary Report (03-18-25 @ 17:00):    No growth at 4 days    Culture - Blood (collected 03-13-25 @ 08:51)  Source: Blood Blood-Peripheral  Final Report (03-18-25 @ 13:00):    No growth at 5 days    Culture - Blood (collected 03-13-25 @ 08:30)  Source: Blood Blood-Peripheral  Final Report (03-18-25 @ 13:00):    No growth at 5 days        ACC: [id number] EXAM:  CT ABDOMEN AND PELVIS IC   ORDERED BY: GIO VERGARA     ACC: [id number] EXAM:  CT CHEST IC   ORDERED BY: GIO VERGARA     PROCEDURE DATE:  03/12/2025          INTERPRETATION:  CLINICAL INFORMATION: Fever and leukocytosis.    COMPARISON: CT chest 2/24/2025.    CONTRAST/COMPLICATIONS:  IV Contrast: Omnipaque 350  90 cc administered   10 cc discarded  Oral Contrast: NONE  .    PROCEDURE:  CT of the Chest, Abdomen and Pelvis was performed.  Sagittal and coronal reformats were performed.    FINDINGS:  CHEST:  LUNGS AND LARGE AIRWAYS: Tracheostomy cannula in appropriate position.   Fluid in left the trachea, and bilateral bronchi, left greater than   right. Left lower lobe consolidation and scattered bilateral tree-in-bud   and groundglass opacities, concerning for pneumonia.  PLEURA: Small bilateral pleural effusions, left greater than right.  VESSELS: Within normal limits.  HEART: Heart size is normal. No pericardial effusion.  MEDIASTINUM AND LISA: Increased number of enlarged and nonenlarged   mediastinal lymph nodes largest is precarinal measuring 1.3 x 1.2 cm,   likely reactive.  CHEST WALL AND LOWER NECK: Within normal limits.    ABDOMEN AND PELVIS:  LIVER: Within normal limits.  BILE DUCTS: Normal caliber.  GALLBLADDER: Layering biliary sludge.  SPLEEN: Within normal limits.  PANCREAS: Within normal limits.  ADRENALS: Within normal limits.  KIDNEYS/URETERS: Bilateral punctate nonobstructing renal stones. No   hydronephrosis.    BLADDER: Wall thickening of the bladder.  REPRODUCTIVE ORGANS: Enlarged prostate.    BOWEL: Gastrostomy tube terminates in the stomach. No bowel obstruction.   Appendix is normal.  PERITONEUM/RETROPERITONEUM: Within normal limits.  VESSELS: Atherosclerotic calcifications.  LYMPH NODES: No lymphadenopathy.  ABDOMINAL WALL: Small bubble of subcutaneous air in the left anterior   thigh. Correlate for history of recent subcutaneous injection.   Infiltration of the soft tissues posterior to the bilateral ischia, right   greater than left as well as posterior to the right greater trochanter.   No evidence of underlying drainable fluid collection. Correlate for   decubitus changes.  BONES: Degenerative changes in the spine.    IMPRESSION:  Multifocal pneumonia. Fluid and debris in the trachea and bronchi, left   greater than right. Aspiration is a consideration.    Small bilateral pleural effusions.    Small bubble of subcutaneous air in the left anterior thigh. Correlate   for history of recent subcutaneous injection.    Enlarged prostate with chronic changes of bladder outlet obstruction.   Correlate with urinalysis if cystitis is of concern.    Infiltration of the soft tissues posterior to the bilateral ischia and   right greater trochanter, without evidence of underlying drainable fluid   collection. Correlate for decubitus changes.            --- End of Report ---          SYBIL SAN MD; Resident Radiologist  This document has been electronically signed.  BRAYAN GRIFFIN MD; Attending Radiologist  Thisdocument has been electronically signed. Mar 13 2025  7:33AM

## 2025-03-19 NOTE — PROGRESS NOTE ADULT - SUBJECTIVE AND OBJECTIVE BOX
[facility]  AUGUST Carbajal Y. Patel, S. Shah, G. Casimir  [phone]  ([phone] - weekdays after 5pm and weekends)    Name: JIMMY BLAND  Age/Gender: 74y Male  MRN: [id number]    Interval History:  Notes reviewed.   No concerning overnight events.  Afebrile.   remains on vent via trach    Allergies: No Known Allergies      Objective:  Vitals:   T(F): 97.2 (03-19-25 @ 08:00), Max: 97.8 (03-19-25 @ 06:50)  HR: 76 (03-19-25 @ 08:00) (57 - 76)  BP: 127/65 (03-19-25 @ 08:00) (127/65 - 154/60)  RR: 17 (03-19-25 @ 08:00) (15 - 17)  SpO2: 100% (03-19-25 @ 08:00) (100% - 100%)  Physical Examination:  General: frail appearing  HEENT: anicteric, tracheostomy, on vent  Cardio: S1, S2, normal rate  Resp: breathing comfortably  Abd: Soft. Nondistended. PEG  Ext: no LE edema  Skin: warm, dry    Laboratory Studies:  CBC:                       8.3    11.16 )-----------( 274      ( 19 Mar 2025 06:59 )             25.9     WBC Trend:  11.16 03-19-25 @ 06:59  10.90 03-17-25 @ 05:11  13.13 03-16-25 @ 08:15  12.61 03-15-25 @ 07:14  16.47 03-14-25 @ 09:45  18.65 03-13-25 @ 06:30    CMP: 03-19    129[L]  |  90[L]  |  55[H]  ----------------------------<  130[H]  3.7   |  24  |  3.11[H]    Ca    9.6      19 Mar 2025 06:59  Phos  3.1     03-19  Mg     2.50     03-19          Vancomycin Level, Random: 18.7 ug/mL (03-19-25 @ 06:59)  Vancomycin Level, Random: 14.6 ug/mL (03-17-25 @ 05:11)    Urinalysis Basic - ( 19 Mar 2025 06:59 )    Color: x / Appearance: x / SG: x / pH: x  Gluc: 130 mg/dL / Ketone: x  / Bili: x / Urobili: x   Blood: x / Protein: x / Nitrite: x   Leuk Esterase: x / RBC: x / WBC x   Sq Epi: x / Non Sq Epi: x / Bacteria: x      Microbiology: reviewed     Culture - Sputum (collected 03-16-25 @ 15:45)  Source: Trach Asp Tracheal Aspirate  Gram Stain (03-17-25 @ 03:24):    Moderate polymorphonuclear leukocytes per low power field    Few Squamous epithelial cells per low power field    Few Squamous epithelial cells seen per oil power field    Moderate Gram Negative Rods seen per oil power field    Few Gram positive cocci in pairs seen per oil power field  Final Report (03-18-25 @ 11:32):    Moderate Pseudomonas aeruginosa (Carbapenem Resistant)    Commensal dominick consistent with body site  Organism: Pseudomonas aeruginosa (Carbapenem Resistant) (03-18-25 @ 11:32)  Organism: Pseudomonas aeruginosa (Carbapenem Resistant) (03-18-25 @ 11:32)      Method Type: CarbaR      -  Resistance Gene to Carbapenem: Nondet  Organism: Pseudomonas aeruginosa (Carbapenem Resistant) (03-18-25 @ 11:32)      Method Type: VIDA      -  Aztreonam: I 16      -  Cefepime: I 16      -  Ceftazidime: R >16      -  Ceftazidime/Avibactam: S <=4      -  Ceftolozane/tazobactam: S <=2      -  Ciprofloxacin: S <=0.25      -  Imipenem: R >8      -  Levofloxacin: S <=0.5      -  Meropenem: R >8      -  Piperacillin/Tazobactam: R >64    Culture - Blood (collected 03-14-25 @ 10:15)  Source: Blood Dialysis Catheter  Preliminary Report (03-18-25 @ 17:00):    No growth at 4 days    Culture - Blood (collected 03-14-25 @ 09:45)  Source: Blood Dialysis Catheter  Preliminary Report (03-18-25 @ 17:00):    No growth at 4 days    Culture - Blood (collected 03-13-25 @ 08:51)  Source: Blood Blood-Peripheral  Final Report (03-18-25 @ 13:00):    No growth at 5 days    Culture - Blood (collected 03-13-25 @ 08:30)  Source: Blood Blood-Peripheral  Final Report (03-18-25 @ 13:00):    No growth at 5 days    Culture - Blood (collected 03-12-25 @ 04:52)  Source: Blood Blood-Venous  Gram Stain (03-13-25 @ 06:28):    Growth in anaerobic bottle: Gram Positive Cocci in Clusters and Gram    Positive Cocci in Pairs and Chains  Final Report (03-16-25 @ 11:55):    Growth in anaerobic bottle: Enterococcus faecium    Growth in anaerobic bottle: Staphylococcus epidermidis "Susceptibilities    not performed"    Growth in anaerobic bottle: Candida auris    Direct identification is available within approximately 3-5    hours either by Blood Panel Multiplexed PCR or Direct    MALDI-TOF. Details: [email or web address]  Organism: Blood Culture PCR  Enterococcus faecium  Candida auris (03-16-25 @ 11:55)  Organism: Candida auris (03-16-25 @ 11:55)      Method Type: YSTMIC      -  Anidulafungin: S 0.12 For this antifungal agent, the data are based largely on experience with non-neutropenic patients with candidemia.  The clinical relevance of this antifungal agent in other settings is uncertain.      -  Micafungin: S 0.12 For this antifungal agent, the data are based largely on experience with non-neutropenic patients with candidemia.  The clinical relevance of this antifungal agent in other settings is uncertain.      -  Voriconazole: N/I 1 For this antifungal agent, the data are based largely on experience with non-neutropenic patients with candidemia.  The clinical relevance of this antifungal agent in other settings is uncertain.      -  Posaconazole: N/I 0.12 For Candida species, no interpretation is available for any in vitro susceptibility testing.      -  Fluconazole: R >64 There are no CLSI or FDA breakpoints for Candida auris. The breakpoints provided for specific antifungal drugs are based on tentative CDC breakpoints and have been validated by [facility] [employer] [facility].  Fluconazole: Susceptibility depends on achieving the maximum possible blood level. Doses higher than the standard dosing amount (6mg/kg/day) may be needed in adults with normal renal function and body habitus.  This test was developed and its performance characteristics determined by [facility] [employer] [facility], [place], [place]  It has not been cleared or approved by the U.S. Food and Drug Administration. S - Susceptible; SDD - Susceptible Dose Dependent; I - Intermediate; R - Resistant; N/I - No Interpretation Available      -  Amphotericin B: N/I 2 For Candida species, no interpretation is available for any in vitro susceptibility testing.      -  Caspofungin: S 0.25 For this antifungal agent, the data are based largely on experience with non-neutropenic patients with candidemia.  The clinical relevance of this antifungal agent in other settings is uncertain.  Organism: Enterococcus faecium (03-16-25 @ 11:55)      Method Type: VIDA      -  Ampicillin: R >8 Predicts results to ampicillin/sulbactam, amoxacillin-clavulanate and  piperacillin-tazobactam.      -  Vancomycin: S 0.5      -  Gentamicin synergy: S <=500      -  Streptomycin synergy: S <=1000  Organism: Blood Culture PCR (03-16-25 @ 11:55)      Method Type: PCR      -  Enterococcus faecium: Detec      -  Staphylococcus epidermidis, Methicillin resistant: Detec      -  Candida auris: Detec    Culture - Blood (collected 03-12-25 @ 04:40)  Source: Blood Blood-Venous  Final Report (03-17-25 @ 10:00):    No growth at 5 days    Culture - Blood (collected 03-09-25 @ 05:20)  Source: Blood Blood-Peripheral  Final Report (03-14-25 @ 09:01):    No growth at 5 days    Culture - Blood (collected 03-09-25 @ 05:05)  Source: Blood Blood-Venous  Final Report (03-14-25 @ 09:01):    No growth at 5 days        Radiology: reviewed     Medications:  albuterol    0.083% 2.5 milliGRAM(s) Nebulizer every 6 hours  amLODIPine   Tablet 10 milliGRAM(s) Oral daily  AQUAPHOR (petrolatum Ointment) 1 Application(s) Topical two times a day  atorvastatin 20 milliGRAM(s) Oral at bedtime  caspofungin IVPB 50 milliGRAM(s) IV Intermittent every 24 hours  caspofungin IVPB      chlorhexidine 0.12% Liquid 15 milliLiter(s) Oral Mucosa every 12 hours  chlorhexidine 2% Cloths 1 Application(s) Topical daily  dextrose 5%. 1000 milliLiter(s) IV Continuous <Continuous>  dextrose 5%. 1000 milliLiter(s) IV Continuous <Continuous>  dextrose 50% Injectable 25 Gram(s) IV Push once  dextrose 50% Injectable 12.5 Gram(s) IV Push once  dextrose 50% Injectable 25 Gram(s) IV Push once  dextrose Oral Gel 15 Gram(s) Oral once PRN  epoetin reilly-epbx (RETACRIT) Injectable 70171 Unit(s) IV Push <User Schedule>  ferrous    sulfate Liquid 300 milliGRAM(s) Enteral Tube daily  glucagon  Injectable 1 milliGRAM(s) IntraMuscular once  heparin   Injectable 5000 Unit(s) SubCutaneous every 12 hours  hydrALAZINE 75 milliGRAM(s) Oral three times a day  insulin glargine Injectable (LANTUS) 12 Unit(s) SubCutaneous at bedtime  insulin lispro (ADMELOG) corrective regimen sliding scale   SubCutaneous every 6 hours  insulin lispro Injectable (ADMELOG) 6 Unit(s) SubCutaneous every 6 hours  Nephro-noam 1 Tablet(s) Oral daily  pantoprazole   Suspension 40 milliGRAM(s) Oral before breakfast  sodium chloride 0.9% Bolus. 100 milliLiter(s) IV Bolus every 5 minutes PRN  sodium chloride 0.9% for Nebulization 3 milliLiter(s) Nebulizer every 6 hours  vancomycin  IVPB 500 milliGRAM(s) IV Intermittent once    Antimicrobials:  caspofungin IVPB 50 milliGRAM(s) IV Intermittent every 24 hours  caspofungin IVPB      vancomycin  IVPB 500 milliGRAM(s) IV Intermittent once

## 2025-03-19 NOTE — PROGRESS NOTE ADULT - TIME BILLING
Personal review of data, imaging and discussion with medical team.
Review of patient records, including history, laboratory data, and imaging. Patient evaluation and assessment. Coordination of care. Time excludes teaching or procedures.
Review of patient records, including history, laboratory data, and imaging. Patient evaluation and assessment. Coordination of care. Time excludes teaching or procedures.
Reviewing the EMR, vitals, imaging, medication list, recent labs, prior records and coordinating care with medical providers. This time excludes procedures and teaching.
Review of patient records, including history, laboratory data, and imaging. Patient evaluation and assessment. Coordination of care. Time excludes teaching or procedures.
Review of patient records, including history, laboratory data, and imaging. Patient evaluation and assessment. Coordination of care. Time excludes teaching or procedures.
Reviewing the EMR, vitals, imaging, medication list, recent labs, prior records and coordinating care with medical providers. This time excludes procedures and teaching.
Review of patient records, including history, laboratory data, and imaging. Patient evaluation and assessment. Coordination of care. Time excludes teaching or procedures.
Reviewing the EMR, vitals, imaging, medication list, recent labs, prior records and coordinating care with medical providers. This time excludes procedures and teaching.
Review of patient records, including history, laboratory data, imaging. Patient evaluation and assessment. Coordination of care. Time excludes teaching.
Reviewing the EMR, vitals, imaging, medication list, recent labs, prior records and coordinating care with medical providers. This time excludes procedures and teaching.
Review of patient records, including history, laboratory data, imaging. Patient evaluation and assessment. Coordination of care. Time excludes teaching.
Review of patient records, including history, laboratory data, imaging. Patient evaluation and assessment. Coordination of care. Time excludes teaching.
Reviewing the EMR, vitals, imaging, medication list, recent labs, prior records and coordinating care with medical providers. This time excludes procedures and teaching.

## 2025-03-19 NOTE — DISCHARGE NOTE PROVIDER - NSDCCPCAREPLAN_GEN_ALL_CORE_FT
PRINCIPAL DISCHARGE DIAGNOSIS  Diagnosis: LLL pneumonia  Assessment and Plan of Treatment: - You were noted with course complicated by multiple recurrent pneumonias second to mucous plug in left lower lobe. Continue on airway clearance with albuterol and sodium chloride nebulization with chest physical therapy, IPV two times a day, and suction as needed.   - Rest of care per nursing home medical team.      SECONDARY DISCHARGE DIAGNOSES  Diagnosis: Fungemia  Assessment and Plan of Treatment: - You were noted with course complicated by fevers and found with candida auris fungemia and enterococcus faecium bacteremia. You are recommended to continue on vancomycin 500mg intravenously on hemodialysis (after session) days (MWF) and caspofungin 50mg intravenous every 24 hours through 3/26/2025 to complete 2 week course. RUE brachial 20G x 10cm midline placed on 3/19/2025 for completion of antibiotics at nursing home.   - Rest of care per nursing home medical team.    Diagnosis: End stage renal disease  Assessment and Plan of Treatment: - You presented initially for AV fistula trouble and required a angiogram and angioplasty with vascular surgery   - AV fistula now patent and works well with HD.   - Please continue on HD MWF   - Please continue on EPO MWF with HD as ordered   - Rest of care per nursing home medical team.    Diagnosis: Chronic respiratory failure with hypoxia  Assessment and Plan of Treatment: - You were noted with chronic respiratory failure with tracheostomy and ventilator support. You are able to tolerate some PS trials, however remains on full vent with RR16, , PEEP 6, and FIO2 40.   - Please continue on above airway clearance nebulizers   - Rest of care per nursing home medical team.    Diagnosis: Hypertension  Assessment and Plan of Treatment: - You were noted with hypertension and continued on hydralazine 75mg three times a day, norvasc 10mg daily and labetalol 100mg three times a day, however noted with mild bradycardia and labetalol stopped.   - Continue on hydralazine and norvasc as ordered and increase hydralazine as needed if noted with hypertension.   - Rest of care per nursing home medical team.    Diagnosis: Functional quadriplegia  Assessment and Plan of Treatment: Wound care recommendations:   Topical recommendations:   - Sacrum: Cleanse with skin cleanser, pat dry. Apply TRIAD paste twice a day and PRN with incontinent episodes. With episodes of incontinence only remove soiled layer of Triad, then reinforce with thin layer.   - Bilateral ischium: Cleanse with NS, pat dry. Apply Liquid barrier film to periwound skin (allow to dry). Apply medihoney gel to wound bed, cover with silicone foam with border. Change daily and PRN if soiled.   - Tracheostomy: Cleanse around trach site with NS. Pat dry. Apply Silicone foam dressing without border beneath trach collar, cut mid dressing to "Y" shape. Change foam dressing every shift and prn. Change trach ties every 3 days.   - PEG: Cleanse q shift with NS, apply liquid barrier film beneath jalen disc.  If redness noted under jalen disc bumper apply thin foam  dressing without border (Mepilex Lite)- cut to mid dressing with a 'Y' shape. Secondary securement to abdomen taping in 'H' fashion with Steri-strips.   - While inpatient, continue low air loss bed therapy, continue heel elevation, continue to turn & reposition per protocol, soft pillow between bony prominences, continue moisture management with barrier creams & single breathable pad, continue measures to decrease friction/shear/pressure. Continue with nutritional support as per dietary/orders.   - Continue wound care per nursing home recommendations     PRINCIPAL DISCHARGE DIAGNOSIS  Diagnosis: LLL pneumonia  Assessment and Plan of Treatment: - You were noted with course complicated by multiple recurrent pneumonias second to mucous plug in left lower lobe. Continue on airway clearance with albuterol and sodium chloride nebulization with chest physical therapy, IPV two times a day, and suction as needed.   - Rest of care per nursing home medical team.      SECONDARY DISCHARGE DIAGNOSES  Diagnosis: Fungemia  Assessment and Plan of Treatment: - You were noted with course complicated by fevers and found with candida auris fungemia and enterococcus faecium bacteremia. You are recommended to continue on vancomycin 500mg intravenously on hemodialysis (after session) days (MWF) and caspofungin 50mg intravenous every 24 hours through 3/26/2025 to complete 2 week course. RUE brachial 20G x 10cm midline placed on 3/19/2025 for completion of antibiotics at nursing home.   - Rest of care per nursing home medical team.    Diagnosis: End stage renal disease  Assessment and Plan of Treatment: - You presented initially for AV fistula trouble and required a angiogram and angioplasty with vascular surgery   - AV fistula now patent and works well with HD.   - Please continue on HD MWF   - Please continue on EPO MWF with HD as ordered   - Rest of care per nursing home medical team.    Diagnosis: Chronic respiratory failure with hypoxia  Assessment and Plan of Treatment: - You were noted with chronic respiratory failure with tracheostomy and ventilator support. You are able to tolerate some PS trials, however remains on full vent with RR16, , PEEP 6, and FIO2 40.   - Please continue on above airway clearance nebulizers   - Rest of care per nursing home medical team.    Diagnosis: Hypertension  Assessment and Plan of Treatment: - You were noted with hypertension and continued on hydralazine 75mg three times a day, norvasc 10mg daily and labetalol 100mg three times a day, however noted with mild bradycardia and labetalol stopped.   - Continue on hydralazine and norvasc as ordered and increase hydralazine as needed if noted with hypertension.   - Rest of care per nursing home medical team.    Diagnosis: Functional quadriplegia  Assessment and Plan of Treatment: Wound care recommendations:   Topical recommendations:   - Sacrum: Cleanse with skin cleanser, pat dry. Apply TRIAD paste twice a day and PRN with incontinent episodes. With episodes of incontinence only remove soiled layer of Triad, then reinforce with thin layer.   - Bilateral ischium: Cleanse with NS, pat dry. Apply Liquid barrier film to periwound skin (allow to dry). Apply medihoney gel to wound bed, cover with silicone foam with border. Change daily and PRN if soiled.   - Tracheostomy: Cleanse around trach site with NS. Pat dry. Apply Silicone foam dressing without border beneath trach collar, cut mid dressing to "Y" shape. Change foam dressing every shift and prn. Change trach ties every 3 days.   - PEG: Cleanse q shift with NS, apply liquid barrier film beneath jalen disc.  If redness noted under jalen disc bumper apply thin foam  dressing without border (Mepilex Lite)- cut to mid dressing with a 'Y' shape. Secondary securement to abdomen taping in 'H' fashion with Steri-strips.   -->Left heel purple maroon discoloration: Apply liquid barrier film twice a day, contiue to offload per hopsital protocol   - Continue low air loss bed therapy, continue heel elevation, continue to turn & reposition per protocol, soft pillow between bony prominences, continue moisture management with barrier creams & single breathable pad, continue measures to decrease friction/shear/pressure. Continue with nutritional support as per dietary/orders.   - Continue wound care per nursing home recommendations     PRINCIPAL DISCHARGE DIAGNOSIS  Diagnosis: LLL pneumonia  Assessment and Plan of Treatment: - You were noted with course complicated by multiple recurrent pneumonias second to mucous plug in left lower lobe. Continue on airway clearance with albuterol and sodium chloride nebulization with chest physical therapy, IPV two times a day, and suction as needed.   - Rest of care per nursing home medical team.      SECONDARY DISCHARGE DIAGNOSES  Diagnosis: Fungemia  Assessment and Plan of Treatment: - You were noted with course complicated by fevers and found with candida auris fungemia and enterococcus faecium bacteremia. You are recommended to continue on vancomycin 500mg intravenously on hemodialysis (after session) days (MWF) and caspofungin 50mg intravenous every 24 hours through 3/26/2025 to complete 2 week course. RUE brachial 20G x 10cm midline placed on 3/19/2025 for completion of antibiotics at nursing home.   - Rest of care per nursing home medical team.    Diagnosis: End stage renal disease  Assessment and Plan of Treatment: - You presented initially for AV fistula trouble and required a angiogram and angioplasty with vascular surgery   - AV fistula now patent and works well with HD.   - Please continue on HD MWF   - Please continue on EPO MWF with HD as ordered   - Rest of care per nursing home medical team.    Diagnosis: Chronic respiratory failure with hypoxia  Assessment and Plan of Treatment: - You were noted with chronic respiratory failure with tracheostomy and ventilator support. You are able to tolerate some PS trials, however remains on full vent with RR16, , PEEP 6, and FIO2 40.   - Please continue on above airway clearance nebulizers   - Rest of care per nursing home medical team.    Diagnosis: Hypertension  Assessment and Plan of Treatment: - You were noted with hypertension and continued on hydralazine 75mg three times a day, norvasc 10mg daily and labetalol 100mg three times a day, however noted with mild bradycardia and labetalol stopped with improvement.   - Continue on hydralazine and norvasc as ordered and increase hydralazine as needed if noted with hypertension.   - TSH elevated to 6 with bradycardia work up, however given improved off labetaolol, more likely euthyroid sick syndrome.   - Recommend RPT TSH and free T4 output.   - Rest of care per nursing home medical team.    Diagnosis: Functional quadriplegia  Assessment and Plan of Treatment: Wound care recommendations:   Topical recommendations:   - Sacrum: Cleanse with skin cleanser, pat dry. Apply TRIAD paste twice a day and PRN with incontinent episodes. With episodes of incontinence only remove soiled layer of Triad, then reinforce with thin layer.   - Bilateral ischium: Cleanse with NS, pat dry. Apply Liquid barrier film to periwound skin (allow to dry). Apply medihoney gel to wound bed, cover with silicone foam with border. Change daily and PRN if soiled.   - Tracheostomy: Cleanse around trach site with NS. Pat dry. Apply Silicone foam dressing without border beneath trach collar, cut mid dressing to "Y" shape. Change foam dressing every shift and prn. Change trach ties every 3 days.   - PEG: Cleanse q shift with NS, apply liquid barrier film beneath jalen disc.  If redness noted under jalen disc bumper apply thin foam  dressing without border (Mepilex Lite)- cut to mid dressing with a 'Y' shape. Secondary securement to abdomen taping in 'H' fashion with Steri-strips.   -->Left heel purple maroon discoloration: Apply liquid barrier film twice a day, contiue to offload per hopsital protocol   - Continue low air loss bed therapy, continue heel elevation, continue to turn & reposition per protocol, soft pillow between bony prominences, continue moisture management with barrier creams & single breathable pad, continue measures to decrease friction/shear/pressure. Continue with nutritional support as per dietary/orders.   - Continue wound care per nursing home recommendations

## 2025-03-19 NOTE — DISCHARGE NOTE NURSING/CASE MANAGEMENT/SOCIAL WORK - NSDCPEFALRISK_GEN_ALL_CORE
For information on Fall & Injury Prevention, visit: [email or web address]  [email or web address] OR  https://www.cdc.gov/steadi/patient.html

## 2025-03-19 NOTE — PROGRESS NOTE ADULT - NUTRITIONAL ASSESSMENT
This patient has been assessed with a concern for Malnutrition and has been determined to have a diagnosis/diagnoses of Severe protein-calorie malnutrition and Underweight (BMI < 19).    This patient is being managed with:   Diet NPO with Tube Feed-  Tube Feeding Modality: Gastrostomy  Nepro with Carb Steady (NEPRORTH)  Total Volume for 24 Hours (mL): 1160  Bolus  Total Volume of Bolus (mL):  290  Total # of Feeds: 4  Tube Feed Frequency: Every 6 hours   Tube Feed Start Time: 12:00  Bolus Feed Rate (mL per Hour): 290   Bolus Feed Duration (in Hours): 0  Liquid Protein Supplement     Qty per Day:  1  Entered: Feb 25 2025  1:41PM  
This patient has been assessed with a concern for Malnutrition and has been determined to have a diagnosis/diagnoses of Severe protein-calorie malnutrition and Underweight (BMI < 19).    This patient is being managed with:   Diet NPO with Tube Feed-  Tube Feeding Modality: Gastrostomy  Nepro with Carb Steady (NEPRORTH)  Total Volume for 24 Hours (mL): 1160  Bolus  Total Volume of Bolus (mL):  290  Total # of Feeds: 4  Tube Feed Frequency: Every 6 hours   Tube Feed Start Time: 12:00  Bolus Feed Rate (mL per Hour): 290   Bolus Feed Duration (in Hours): 0  Liquid Protein Supplement     Qty per Day:  2  Entered: Mar 14 2025  2:19PM  
This patient has been assessed with a concern for Malnutrition and has been determined to have a diagnosis/diagnoses of Severe protein-calorie malnutrition and Underweight (BMI < 19).    This patient is being managed with:   Diet NPO with Tube Feed-  Tube Feeding Modality: Gastrostomy  Nepro with Carb Steady (NEPRORTH)  Total Volume for 24 Hours (mL): 1160  Bolus  Total Volume of Bolus (mL):  290  Total # of Feeds: 4  Tube Feed Frequency: Every 6 hours   Tube Feed Start Time: 12:00  Bolus Feed Rate (mL per Hour): 290   Bolus Feed Duration (in Hours): 0  Liquid Protein Supplement     Qty per Day:  2  Entered: Mar 14 2025  2:19PM  
This patient has been assessed with a concern for Malnutrition and has been determined to have a diagnosis/diagnoses of Severe protein-calorie malnutrition and Underweight (BMI < 19).    This patient is being managed with:   Diet NPO with Tube Feed-  Tube Feeding Modality: Gastrostomy  Nepro with Carb Steady (NEPRORTH)  Total Volume for 24 Hours (mL): 1170  Continuous  Starting Tube Feed Rate {mL per Hour}: 25  Increase Tube Feed Rate by (mL): 10     Every 4 hours  Until Goal Tube Feed Rate (mL per Hour): 65  Tube Feed Duration (in Hours): 18  Tube Feed Start Time: 12:00  Tube Feed Stop Time: 06:00  Liquid Protein Supplement     Qty per Day:  1  Entered: Feb 19 2025  2:49PM  
This patient has been assessed with a concern for Malnutrition and has been determined to have a diagnosis/diagnoses of Severe protein-calorie malnutrition and Underweight (BMI < 19).    This patient is being managed with:   Diet NPO with Tube Feed-  Tube Feeding Modality: Gastrostomy  Nepro with Carb Steady (NEPRORTH)  Total Volume for 24 Hours (mL): 1160  Bolus  Total Volume of Bolus (mL):  290  Total # of Feeds: 4  Tube Feed Frequency: Every 6 hours   Tube Feed Start Time: 12:00  Bolus Feed Rate (mL per Hour): 290   Bolus Feed Duration (in Hours): 0  Liquid Protein Supplement     Qty per Day:  1  Entered: Feb 25 2025  1:41PM  
This patient has been assessed with a concern for Malnutrition and has been determined to have a diagnosis/diagnoses of Severe protein-calorie malnutrition and Underweight (BMI < 19).    This patient is being managed with:   Diet NPO with Tube Feed-  Tube Feeding Modality: Gastrostomy  Nepro with Carb Steady (NEPRORTH)  Total Volume for 24 Hours (mL): 1160  Bolus  Total Volume of Bolus (mL):  290  Total # of Feeds: 4  Tube Feed Frequency: Every 6 hours   Tube Feed Start Time: 12:00  Bolus Feed Rate (mL per Hour): 290   Bolus Feed Duration (in Hours): 0  Liquid Protein Supplement     Qty per Day:  1  Entered: Feb 25 2025  1:41PM  
This patient has been assessed with a concern for Malnutrition and has been determined to have a diagnosis/diagnoses of Severe protein-calorie malnutrition and Underweight (BMI < 19).    This patient is being managed with:   Diet NPO with Tube Feed-  Tube Feeding Modality: Gastrostomy  Nepro with Carb Steady (NEPRORTH)  Total Volume for 24 Hours (mL): 1170  Continuous  Starting Tube Feed Rate {mL per Hour}: 25  Increase Tube Feed Rate by (mL): 10     Every 4 hours  Until Goal Tube Feed Rate (mL per Hour): 65  Tube Feed Duration (in Hours): 18  Tube Feed Start Time: 12:00  Tube Feed Stop Time: 06:00  Liquid Protein Supplement     Qty per Day:  1  Entered: Feb 19 2025  2:49PM  
This patient has been assessed with a concern for Malnutrition and has been determined to have a diagnosis/diagnoses of Severe protein-calorie malnutrition and Underweight (BMI < 19).    This patient is being managed with:   Diet NPO with Tube Feed-  Tube Feeding Modality: Gastrostomy  Nepro with Carb Steady (NEPRORTH)  Total Volume for 24 Hours (mL): 1170  Continuous  Starting Tube Feed Rate {mL per Hour}: 25  Increase Tube Feed Rate by (mL): 10     Every 4 hours  Until Goal Tube Feed Rate (mL per Hour): 65  Tube Feed Duration (in Hours): 18  Tube Feed Start Time: 12:00  Tube Feed Stop Time: 06:00  Liquid Protein Supplement     Qty per Day:  1  Entered: Feb 19 2025  2:49PM  
This patient has been assessed with a concern for Malnutrition and has been determined to have a diagnosis/diagnoses of Severe protein-calorie malnutrition and Underweight (BMI < 19).    This patient is being managed with:   Diet NPO with Tube Feed-  Tube Feeding Modality: Gastrostomy  Nepro with Carb Steady (NEPRORTH)  Total Volume for 24 Hours (mL): 1160  Bolus  Total Volume of Bolus (mL):  290  Total # of Feeds: 4  Tube Feed Frequency: Every 6 hours   Tube Feed Start Time: 12:00  Bolus Feed Rate (mL per Hour): 290   Bolus Feed Duration (in Hours): 0  Liquid Protein Supplement     Qty per Day:  1  Entered: Feb 25 2025  1:41PM  
This patient has been assessed with a concern for Malnutrition and has been determined to have a diagnosis/diagnoses of Severe protein-calorie malnutrition and Underweight (BMI < 19).    This patient is being managed with:   Diet NPO with Tube Feed-  Tube Feeding Modality: Gastrostomy  Nepro with Carb Steady (NEPRORTH)  Total Volume for 24 Hours (mL): 1170  Continuous  Starting Tube Feed Rate {mL per Hour}: 25  Increase Tube Feed Rate by (mL): 10     Every 4 hours  Until Goal Tube Feed Rate (mL per Hour): 65  Tube Feed Duration (in Hours): 18  Tube Feed Start Time: 12:00  Tube Feed Stop Time: 06:00  Liquid Protein Supplement     Qty per Day:  1  Entered: Feb 19 2025  2:49PM  
This patient has been assessed with a concern for Malnutrition and has been determined to have a diagnosis/diagnoses of Severe protein-calorie malnutrition and Underweight (BMI < 19).    This patient is being managed with:   Diet NPO with Tube Feed-  Tube Feeding Modality: Gastrostomy  Nepro with Carb Steady (NEPRORTH)  Total Volume for 24 Hours (mL): 1160  Bolus  Total Volume of Bolus (mL):  290  Total # of Feeds: 4  Tube Feed Frequency: Every 6 hours   Tube Feed Start Time: 12:00  Bolus Feed Rate (mL per Hour): 290   Bolus Feed Duration (in Hours): 0  Liquid Protein Supplement     Qty per Day:  1  Entered: Feb 25 2025  1:41PM  
This patient has been assessed with a concern for Malnutrition and has been determined to have a diagnosis/diagnoses of Severe protein-calorie malnutrition and Underweight (BMI < 19).    This patient is being managed with:   Diet NPO with Tube Feed-  Tube Feeding Modality: Gastrostomy  Nepro with Carb Steady (NEPRORTH)  Total Volume for 24 Hours (mL): 1160  Bolus  Total Volume of Bolus (mL):  290  Total # of Feeds: 4  Tube Feed Frequency: Every 6 hours   Tube Feed Start Time: 12:00  Bolus Feed Rate (mL per Hour): 290   Bolus Feed Duration (in Hours): 0  Liquid Protein Supplement     Qty per Day:  2  Entered: Mar 14 2025  2:19PM  
This patient has been assessed with a concern for Malnutrition and has been determined to have a diagnosis/diagnoses of Severe protein-calorie malnutrition and Underweight (BMI < 19).    This patient is being managed with:   Diet NPO with Tube Feed-  Tube Feeding Modality: Gastrostomy  Nepro with Carb Steady (NEPRORTH)  Total Volume for 24 Hours (mL): 1170  Continuous  Starting Tube Feed Rate {mL per Hour}: 25  Increase Tube Feed Rate by (mL): 10     Every 4 hours  Until Goal Tube Feed Rate (mL per Hour): 65  Tube Feed Duration (in Hours): 18  Tube Feed Start Time: 12:00  Tube Feed Stop Time: 06:00  Liquid Protein Supplement     Qty per Day:  1  Entered: Feb 19 2025  2:49PM  
This patient has been assessed with a concern for Malnutrition and has been determined to have a diagnosis/diagnoses of Severe protein-calorie malnutrition and Underweight (BMI < 19).    This patient is being managed with:   Diet NPO with Tube Feed-  Tube Feeding Modality: Gastrostomy  Nepro with Carb Steady (NEPRORTH)  Total Volume for 24 Hours (mL): 1170  Continuous  Starting Tube Feed Rate {mL per Hour}: 25  Increase Tube Feed Rate by (mL): 10     Every 4 hours  Until Goal Tube Feed Rate (mL per Hour): 65  Tube Feed Duration (in Hours): 18  Tube Feed Start Time: 12:00  Tube Feed Stop Time: 06:00  Liquid Protein Supplement     Qty per Day:  1  Entered: Feb 19 2025  2:49PM  
This patient has been assessed with a concern for Malnutrition and has been determined to have a diagnosis/diagnoses of Severe protein-calorie malnutrition and Underweight (BMI < 19).    This patient is being managed with:   Diet NPO with Tube Feed-  Tube Feeding Modality: Gastrostomy  Nepro with Carb Steady (NEPRORTH)  Total Volume for 24 Hours (mL): 1160  Bolus  Total Volume of Bolus (mL):  290  Total # of Feeds: 4  Tube Feed Frequency: Every 6 hours   Tube Feed Start Time: 12:00  Bolus Feed Rate (mL per Hour): 290   Bolus Feed Duration (in Hours): 0  Liquid Protein Supplement     Qty per Day:  1  Entered: Feb 25 2025  1:41PM  
This patient has been assessed with a concern for Malnutrition and has been determined to have a diagnosis/diagnoses of Severe protein-calorie malnutrition and Underweight (BMI < 19).    This patient is being managed with:   Diet NPO with Tube Feed-  Tube Feeding Modality: Gastrostomy  Nepro with Carb Steady (NEPRORTH)  Total Volume for 24 Hours (mL): 1160  Bolus  Total Volume of Bolus (mL):  290  Total # of Feeds: 4  Tube Feed Frequency: Every 6 hours   Tube Feed Start Time: 12:00  Bolus Feed Rate (mL per Hour): 290   Bolus Feed Duration (in Hours): 0  Liquid Protein Supplement     Qty per Day:  1  Entered: Feb 25 2025  1:41PM  
This patient has been assessed with a concern for Malnutrition and has been determined to have a diagnosis/diagnoses of Severe protein-calorie malnutrition and Underweight (BMI < 19).    This patient is being managed with:   Diet NPO with Tube Feed-  Tube Feeding Modality: Gastrostomy  Nepro with Carb Steady (NEPRORTH)  Total Volume for 24 Hours (mL): 1160  Bolus  Total Volume of Bolus (mL):  290  Total # of Feeds: 4  Tube Feed Frequency: Every 6 hours   Tube Feed Start Time: 12:00  Bolus Feed Rate (mL per Hour): 290   Bolus Feed Duration (in Hours): 0  Liquid Protein Supplement     Qty per Day:  2  Entered: Mar 14 2025  2:19PM  
This patient has been assessed with a concern for Malnutrition and has been determined to have a diagnosis/diagnoses of Severe protein-calorie malnutrition and Underweight (BMI < 19).    This patient is being managed with:   Diet NPO with Tube Feed-  Tube Feeding Modality: Gastrostomy  Nepro with Carb Steady (NEPRORTH)  Total Volume for 24 Hours (mL): 1160  Bolus  Total Volume of Bolus (mL):  290  Total # of Feeds: 4  Tube Feed Frequency: Every 6 hours   Tube Feed Start Time: 12:00  Bolus Feed Rate (mL per Hour): 290   Bolus Feed Duration (in Hours): 0  Liquid Protein Supplement     Qty per Day:  1  Entered: Feb 25 2025  1:41PM  
This patient has been assessed with a concern for Malnutrition and has been determined to have a diagnosis/diagnoses of Severe protein-calorie malnutrition and Underweight (BMI < 19).    This patient is being managed with:   Diet NPO with Tube Feed-  Tube Feeding Modality: Gastrostomy  Nepro with Carb Steady (NEPRORTH)  Total Volume for 24 Hours (mL): 1160  Bolus  Total Volume of Bolus (mL):  290  Total # of Feeds: 4  Tube Feed Frequency: Every 6 hours   Tube Feed Start Time: 12:00  Bolus Feed Rate (mL per Hour): 290   Bolus Feed Duration (in Hours): 0  Liquid Protein Supplement     Qty per Day:  1  Entered: Feb 25 2025  1:41PM  
This patient has been assessed with a concern for Malnutrition and has been determined to have a diagnosis/diagnoses of Severe protein-calorie malnutrition and Underweight (BMI < 19).    This patient is being managed with:   Diet NPO with Tube Feed-  Tube Feeding Modality: Gastrostomy  Nepro with Carb Steady (NEPRORTH)  Total Volume for 24 Hours (mL): 1160  Bolus  Total Volume of Bolus (mL):  290  Total # of Feeds: 4  Tube Feed Frequency: Every 6 hours   Tube Feed Start Time: 12:00  Bolus Feed Rate (mL per Hour): 290   Bolus Feed Duration (in Hours): 0  Liquid Protein Supplement     Qty per Day:  2  Entered: Mar 14 2025  2:19PM

## 2025-03-19 NOTE — PROGRESS NOTE ADULT - ASSESSMENT
73 y/o M PMhx trach/vent (last changed 10/23/24), CVA x2 with residual R hemiplegia, COPD, ESRD on HD MWF who presented to ED for leukocytosis when initially planned for fistulogram and noted to have a WBC of 18.   found to have DVT  he was treated for E faecium UTI, and  pseudomonas VAP  hospital course c/b E faecium bacteremia and candida auris candidemia    E faecium bacteremia, candidemia  blood cx from 3/12- cx w/ candida auris and E faecium, MRSE  - E faecium vanc sensitive  - candida auris- caspofungin sensitive  CT C/A/P- Multifocal pneumonia. Fluid and debris in the trachea and bronchi, left greater than right. Aspiration is a consideration. Infiltration of the soft tissues posterior to the bilateral ischia and right greater trochanter, without evidence of underlying drainable fluid collection.  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  [facility]  [phone]  After 5pm on weekdays and all day on weekends - please call [phone]  Available on microsoft teams    Thank you for consulting us and involving us in the management of this patients case. In addition to reviewing history, imaging, documents, labs, microbiology, took into account antibiotic stewardship, local antibiogram and infection control strategies and potential transmission issues.

## 2025-03-19 NOTE — DISCHARGE NOTE NURSING/CASE MANAGEMENT/SOCIAL WORK - FINANCIAL ASSISTANCE
[facility] provides services at a reduced cost to those who are determined to be eligible through [facility]’s financial assistance program. Information regarding [facility]’s financial assistance program can be found by going to [email or web address] or by calling [phone].

## 2025-03-19 NOTE — PROCEDURE NOTE - ADDITIONAL PROCEDURE DETAILS
Patient requiring PIV access for medical management. Prior to procedure, patient was properly identified using name and . Site prepped using chlorhexidine and tourniquet applied. Ultrasound was used to identify a LUE vein, easily compressible, no thrombus, non-pulsatile. A  20G x 10cm Powerglide Midline was introduced into the skin and inserted into the identified vessel. Flash seen in needle housing and placement confirmed on US. Guidewire advanced and catheter advanced over guide wire. Needle withdrawn and placement again confirmed with US visualization and flush portraying active bubbles in brachial vein. IV extension tubing with saline flush attached and placement again confirmed with positive blood return/ flash. IV flushes easily without resistance, and no swelling appreciated at insertion site. Site cleaned with alcohol, Cavilon skin prep applied, and cath secured with central line dressing. Patient tolerated procedure well with no complications and no blood loss. Nursing staff informed and dressing labeled with initials, date and time. Dressing to be changed Qweekly.     JANET Mercedes, PA-C  Department of Medicine/ [facility]  In house [facility] [id number]  In house Medicine [facility] [phone]  Reachable via teams
Patient requiring PIV access for medical management. Prior to procedure, patient was properly identified using name and . Site prepped using chlorhexidine and tourniquet applied. Ultrasound was used to identify a R brachial vein, easily compressible, no thrombus, non-pulsatile. A  20G x 10cm Powerglide Midline was introduced into the skin and inserted into the identified vessel. Flash seen in needle housing and placement confirmed on US. Guidewire advanced and catheter advanced over guide wire. Needle withdrawn and placement again confirmed with US visualization and flush portraying active bubbles in brachial vein. IV extension tubing with saline flush attached and placement again confirmed with positive blood return/ flash. IV flushes easily without resistance, and no swelling appreciated at insertion site. Site cleaned with alcohol, Cavilon skin prep applied, and cath secured with central line dressing. Patient tolerated procedure well with no complications and no blood loss. Nursing staff informed and dressing labeled with initials, date and time. Dressing to be changed Qweekly.     JANET Mercedes, PA-C  Department of Medicine/ [facility]  In house [facility] [id number]  In house Medicine Beeper [phone]  Reachable via teams
Patient requiring PIV access for medical management. Prior to procedure, patient was properly identified using name and . Site prepped using chlorhexidine and tourniquet applied. Ultrasound was used to identify a L brachial vein, easily compressible, no thrombus, non-pulsatile. A  20G x 5.7cm AccR-Healthth Ace Intravascular Catheter was introduced into the skin and inserted into the identified vessel. Flash seen in needle housing and placement confirmed on US. Guidewire advanced and catheter advanced over guide wire. Needle withdrawn and placement again confirmed with US visualization. Clave with saline flush attached and placement again confirmed with positive blood return/ flash. IV flushes easily without resistance, and no swelling appreciated at insertion site. Site cleaned with alcohol, Cavilon skin prep applied, and cath secured with central line dressing. Patient tolerated procedure well with no complications and no blood loss. Nursing staff informed and dressing labeled with initials, date and time. Dressing to be changed Qweekly.     JANET Mercedes, PA-C  Department of Medicine/ R[facility]  In house [facility]CU [facility] [id number]  In house Medicine Beeper [phone]  Reachable via teams
Patient requiring PIV access for medical management. Prior to procedure, patient was properly identified using name and . Site prepped using chlorhexidine and tourniquet applied. Ultrasound was used to identify a LUE easily compressible, no thrombus, non-pulsatile vein. A  20G Peripheral IV was introduced into the skin and inserted into the identified vessel. Flash seen in needle housing and placement confirmed on US. Clave with saline flush attached and placement again confirmed with positive blood return/ flash. IV flushes easily without resistance, and no swelling appreciated at insertion site. Site cleaned with alcohol and PIV secured with clean dressing. Patient tolerated procedure well with no complications and no blood loss. Nursing staff informed.    Adams Rowley PA-C,   Internal Medicine ACP   Respiratory Care Unit   Spectra [phone]

## 2025-03-19 NOTE — DISCHARGE NOTE PROVIDER - NSDCMRMEDTOKEN_GEN_ALL_CORE_FT
acetaminophen 325 mg oral tablet: 2 tab(s) by PEG tube every 6 hours as needed for  mild pain  albuterol 2.5 mg/3 mL (0.083%) inhalation solution: 3 milliliter(s) inhaled every 6 hours  amLODIPine 10 mg oral tablet: 1 tab(s) by PEG tube once a day  aspirin 81 mg oral tablet, chewable: 1 tab(s) orally once a day via peg  atorvastatin 20 mg oral tablet: 1 tab(s) by gastrostomy tube once a day (at bedtime)  caspofungin: 50 milligram(s) intravenous every 24 hours through 3/26  epoetin reilly: 10,000 unit(s) intravenous 3 times a week to be given during HD on [facility]  ferrous sulfate: 220 milligram(s) by PEG tube once a day  hydrALAZINE 25 mg oral tablet: 3 tab(s) by gastrostomy tube 3 times a day  insulin glargine 100 units/mL subcutaneous solution: 12 unit(s) subcutaneous once a day (at bedtime)  insulin lispro 100 units/mL injectable solution: 1 unit(s) subcutaneous every 6 hours 1 Unit(s) if Glucose 151 - 200  2 Unit(s) if Glucose 201 - 250  3 Unit(s) if Glucose 251 - 300  4 Unit(s) if Glucose 301 - 350  5 Unit(s) if Glucose 351 - 400  6 Unit(s) if Glucose Greater Than 400  insulin lispro 100 units/mL injectable solution: 6 unit(s) subcutaneous every 6 hours  Nephro-Kavon oral tablet: 1 tab(s) by PEG tube once a day  omeprazole 40 mg oral delayed release capsule: 1 cap(s) by PEG tube once a day  petrolatum topical ointment: 1 Apply topically to affected area 2 times a day  sodium chloride 0.9% inhalation solution: 3 milliliter(s) inhaled every 6 hours  vancomycin 500 mg intravenous injection: 500 milligram(s) intravenous 3 times a week to be given AFTER HD through 3/26

## 2025-03-19 NOTE — PROGRESS NOTE ADULT - ASSESSMENT
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 Patent recently admitted in 6/2024 for left pontine and cerebellar CVA requiring tracheostomy. While at [facility] patient decannulated and passed SS with advanced diet to easy to chew with thin liquids, but complicated COVID requiring transfer to [facility] in 7/2024 and then ultimately discharged home. Per wife patient was doing well at home until 10/2024 when he was found with RLL with concern for aspiration requiring intubation, then tracheostomy, and ultimately discharged to NH with vent dependence in 11/2024. Patient now presented for RUE fistulogram and angioplasty with vascular, however course complicated by leukocytosis with concern for recurrent trachitis vs PNA and UTI, AOCD and hyperglycemia. Admitted to RCU for further management. Found with  PSA trachitis/ PNA and enterococcus faecium UTI. Course complicated by RUE brachial DVT and hypoxia with HD with concern for volume down vs PE? Volume removal held and hypoxia improved. AC given empirically and CTA CHEST with no PE. Case discussed with vascular and since brachial DVT appears old no need for chronic AC . Course further complicated by PSA LLL PNA and completed ABX followed by low grade fever and worsening leukocytosis. Found with MRSE, Candida auris and E. Faecium bacteremia and likely recurrent  PNA    NEUROLOGY  # Poor mentation second to metabolic encephalopathy vs psychosomatic/ depression   - Hx of L cerebellar and pontene embolic CVA x 2 with residual R hemiplegia   - AOx0, bedbound, and nonverbal at baseline per report, however per exam patient able to open eyes, able to follow commands on left (LUE>LLE) with SEVERE weakness, however noted with R hemiplegia per baseline  - Nods yes and no occasionally however keeps eyes closed likely psychosomatic vs metabolic encephalopathy with infections?   - Last CTH in 10/2024 with no acute findings   - Hold [facility] CTH for now   - Monitor mentation     CARDIOVASCULAR  # Hx of HTN and HLD  - Continue on hydral 75 (increased 3/13), norvasc 10, and labetalol 100 mg BID   - Monitor BP     # Incidental Pericardial effusion   - Incidental small pericardial effusion seen adjacent to right ventricle, however IVC appears flat and LE without edema with low concern for RV failure.   - Prior TTE reviewed from 4/2024 with normal RVLVSF with no pericardial effusion noted at the time.   - TTE 2/23 with EF 65, normal LVRVSF, mild LAD, normal RA, mild pulmonary hypertension, and small pericardial effusion noted adjacent to the anterior right ventricle with no echocardiographic evidence of tamponade  - Monitor hemodynamics     RESPIRATORY  # Respiratory failure second to PNA vs volume down vs PE?   - Hx of COPD with chronic respiratory failure with tracheostomy and vent dependence  - Admitted for angioplasty of RUE AVF, however course complicated by leukocytosis with concern for recurrent trachitis/ PNA.   - Course complicated by hypoxia during HD likely volume down vs migration of RUE brachial DVT with PE?   - Held volume removal, bolus given, and hypoxia improved.   - CTA CHEST without PE  - Course complicated by LLL mucous plug and IPV started with improvement, however recurrent PNA concern and CT with worsening LLL atelectasis.    - Continue on albuterol, NS 0.9 and IPV  - Continue on vent 15/400/6/40 with PS trials as able    HEENT   # Hemoptysis   - Wife reported hemoptysis while at nursing home 2 weeks prior to admission   - No hemoptysis noted on admission   - Hemoptysis returned in house   - CTA CHEST 2/24 without PE  - Bronch 2/26 with no evidence of bleeding   - Bleeding resolved     GI  # Dysphagia with PEG   - Passed SS with advanced diet to easy to chew with thin liquids in 7/2024, however since recurrent aspiration in 10/2024 now with PEG/ vent dependence   - Continue on PEG TF and changed to bolus feeds   - Monitor tolerance     RENAL  # ESRD on HD   # Dehydration   - Resumed on HD with no flow issue from AVF   - Course complicated by hypoxia with concern for volume down given small IVC and elevated lactate on 2/21  - Volume removal held, lactate improved, and hypoxia improved.   - Continue on HD MWF in house per renal   - Return to HD MTRF outpatient   - Monitor renal function, electrolytes and UOP     # AVF trouble   # AVF oozing   - Presented for RUE fistulogram and angioplasty with vascular on 2/18   - Oozing from AVF post HD, surgicell placed, and AC held with improvement.  - Discussed with vascular and no need for AC given DVT as below is chronic   - RPT DOPPLER with again noted DVT in right stented brachial vein, however als noted with significantly elevated flow velocities with turbulence at the anastomotic site. Wall thickening noted at the proximal segment of the outflow vein, which is otherwise patent. Distal segment of the outflow vein appears patent without thrombosis evidence.   - Discussed with vascular and given patent AVF with no HD trouble no need for fistulogram at this time.     # Hyponatremia likely volume down   - Concern for volume issues, however overall appears volume down with serum osmo 307  - Sodium 128 and improved with HD/ bolus during HD.   - Trend sodium with HD for now     # Elevated lactate   - Lactate elevated likely second to volume down?   - Post bolus lactate trending down from 3.3 to 2.7 to 1.3    INFECTIOUS DISEASE  # Candida auris and E. Faecium bacteremia   - WBC rising and low grade fever   - PanCT with worsening LLL atelectasis and BL small PLEFs  - BCx 3/12 with MRSE, candida auris and enterococcus faecium   - BCx 3/13 and 3/14 negative   - TTE 3/13 negative for vegetations   - Optho eval 3/13 without evidence of endophthalmitis  - WOC eval 3/13 with no concern for wound infection  - Prior SCx with  PSA sensitive to zosyn    - Continue on vanco on HD days (3/13, 3/14, 3/17...) and and caspo through 3/26 for 2 week course   - Continue on zosyn (3/12 - 3/18)     # Recurrent LLL PNA   - CXR with LLL mucous plug   - POCUS with LLL consolidation vs atelectasis   - Recent BAL with PSA as below   - FULL RVP negative  - SCx with PSA  - Fevers returned and zosyn (3/3 - 3/11)     # Hemoptysis   - Hemoptysis as above   - BAL with PSA as prior   - No fever and monitor off ABX     # Trachitis vs PNA/ UTI   - Hx of steno and PSA in sputum   - Flu/ COVID negative   - MRSA negative   - UCx with enterococcus   - SCx with  PSA   - Found with leukocytosis and started on cefepime and christiano (2/19) and vanco on HD days (2/21).   - No further steno seen in sputum and continue on cefepime (2/19 - 2/25) and vanco on HD days (2/21, 2/22 - 2/25)   - WBC increased likely reactive to hypoxic event and now improving  - ID following     # PPX   - MRSA PCR negative  - Candida auris PCR POSITIVE  - Continue on isolation precautions per IC    HEME  # AOCD   - Presented for angioplasty and found with anemia to 6.7 s/p 1U PRBC 2/19 and 3/14 and 1/2 PRBC 2/23 and monitoring HH   - Anemia panel with concern for AOCD   - No overt signs of bleeding  - Continue on EPO and Fe     # Heparin resistance?   - PTT persistently low despite increasing heparin GTT   - Resume on heparin GTT and anti Xa level supra therapeutic on but PTT remained low  - Patient ultimately with concern for heparin resistance    VASCULAR  # RUE DVT   - RUE edema noted with age-indeterminate, non-occlusive deep vein thrombosis noted in the right brachial vein.   - Case discussed with vascular who recommends full AC   - CTH from prior with encephalomalcia, however no hx of intracranial bleed   - Prior UGIB while on AC, however discharged on eliquis in 7/2024 and completed 6 month course for RLE DVT   - Continue on heparin GTT, however held for hemoptysis and resistance   - Continued on argatroban with good tolerance and then changed to eliquis   - Case discussed vascular and DVT likely chronic and no need for eliquis     ENDOCRINE  # DM2 A1C 14.0 (1/2024) > 6.4 (2/2025)  - Presented with hyperglycemia and continued on lantus and ISS   - Increased lantus, however FS continues to trend in 200s and changed to NPH with improvement.  - TF interrupted for bronchoscopy and adjustment to bolus feeds and FS dropped.   - NPH stopped and FS improved, however trended back up with continuation of tube feeds.   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 Patent recently admitted in 6/2024 for left pontine and cerebellar CVA requiring tracheostomy. While at [facility] patient decannulated and passed SS with advanced diet to easy to chew with thin liquids, but complicated COVID requiring transfer to [facility] in 7/2024 and then ultimately discharged home. Per wife patient was doing well at home until 10/2024 when he was found with RLL with concern for aspiration requiring intubation, then tracheostomy, and ultimately discharged to NH with vent dependence in 11/2024. Patient now presented for RUE fistulogram and angioplasty with vascular, however course complicated by leukocytosis with concern for recurrent trachitis vs PNA and UTI, AOCD and hyperglycemia. Admitted to RCU for further management. Found with  PSA trachitis/ PNA and enterococcus faecium UTI. Course complicated by RUE brachial DVT and hypoxia with HD with concern for volume down vs PE? Volume removal held and hypoxia improved. AC given empirically and CTA CHEST with no PE. Case discussed with vascular and since brachial DVT appears old no need for chronic AC . Course further complicated by PSA LLL PNA and completed ABX followed by low grade fever and worsening leukocytosis. Found with MRSE, Candida auris and E. Faecium bacteremia and likely recurrent  PNA    NEUROLOGY  # Poor mentation second to metabolic encephalopathy vs psychosomatic/ depression   - Hx of L cerebellar and pontene embolic CVA x 2 with residual R hemiplegia   - AOx0, bedbound, and nonverbal at baseline per report, however per exam patient able to open eyes, able to follow commands on left (LUE>LLE) with SEVERE weakness, however noted with R hemiplegia per baseline  - Nods yes and no occasionally however keeps eyes closed likely psychosomatic vs metabolic encephalopathy with infections?   - Last CTH in 10/2024 with no acute findings   - Hold [facility] CTH for now   - Monitor mentation     CARDIOVASCULAR  # HTN   # Bradycardia   - Continue on hydral 75 (increased 3/13) and norvasc 10    - Continue on labetalol but noted with a vega and turned off with improvement   - Monitor BP and HR    # Incidental Pericardial effusion   - Incidental small pericardial effusion seen adjacent to right ventricle, however IVC appears flat and LE without edema with low concern for RV failure.   - Prior TTE reviewed from 4/2024 with normal RVLVSF with no pericardial effusion noted at the time.   - TTE 2/23 with EF 65, normal LVRVSF, mild LAD, normal RA, mild pulmonary hypertension, and small pericardial effusion noted adjacent to the anterior right ventricle with no echocardiographic evidence of tamponade  - Monitor hemodynamics     RESPIRATORY  # Respiratory failure second to PNA vs volume down vs PE?   - Hx of COPD with chronic respiratory failure with tracheostomy and vent dependence  - Admitted for angioplasty of RUE AVF, however course complicated by leukocytosis with concern for recurrent trachitis/ PNA.   - Course complicated by hypoxia during HD likely volume down vs migration of RUE brachial DVT with PE?   - Held volume removal, bolus given, and hypoxia improved.   - CTA CHEST without PE  - Course complicated by LLL mucous plug and IPV started with improvement, however recurrent PNA concern and CT with worsening LLL atelectasis.    - Continue on albuterol, NS 0.9 and IPV with improvement  - Continue on vent 15/400/6/40     HEENT   # Hemoptysis   - Wife reported hemoptysis while at nursing home 2 weeks prior to admission   - No hemoptysis noted on admission   - Hemoptysis returned in house   - CTA CHEST 2/24 without PE  - Bronch 2/26 with no evidence of bleeding   - Bleeding resolved     GI  # Dysphagia with PEG   - Passed SS with advanced diet to easy to chew with thin liquids in 7/2024, however since recurrent aspiration in 10/2024 now with PEG/ vent dependence   - Continue on PEG TF and changed to bolus feeds   - Monitor tolerance     RENAL  # ESRD on HD   # Dehydration   - Resumed on HD with no flow issue from AVF   - Course complicated by hypoxia with concern for volume down given small IVC and elevated lactate on 2/21  - Volume removal held, lactate improved, and hypoxia improved.   - Continue on HD MWF in house per renal   - Return to HD MTRF outpatient   - Monitor renal function, electrolytes and UOP     # AVF trouble   # AVF oozing   - Presented for RUE fistulogram and angioplasty with vascular on 2/18   - Oozing from AVF post HD, surgicell placed, and AC held with improvement.  - Discussed with vascular and no need for AC given DVT as below is chronic   - RPT DOPPLER with again noted DVT in right stented brachial vein, however als noted with significantly elevated flow velocities with turbulence at the anastomotic site. Wall thickening noted at the proximal segment of the outflow vein, which is otherwise patent. Distal segment of the outflow vein appears patent without thrombosis evidence.   - Discussed with vascular and given patent AVF with no HD trouble no need for fistulogram at this time.     # Hyponatremia likely volume down   - Concern for volume issues, however overall appears volume down with serum osmo 307  - Sodium 128 and improved with HD/ bolus during HD.   - Trend sodium with HD for now     # Elevated lactate   - Lactate elevated likely second to volume down?   - Post bolus lactate trending down from 3.3 to 2.7 to 1.3    INFECTIOUS DISEASE  # Candida auris and E. Faecium bacteremia   - WBC rising and low grade fever   - PanCT with worsening LLL atelectasis and BL small PLEFs  - BCx 3/12 with MRSE, candida auris and enterococcus faecium   - BCx 3/13 and 3/14 negative   - TTE 3/13 negative for vegetations   - Optho eval 3/13 without evidence of endophthalmitis  - WOC eval 3/13 with no concern for wound infection  - Prior SCx with  PSA sensitive to zosyn   - Completed on zosyn (3/12 - 3/18)   - Continue on vanco on HD days (3/13, 3/14, 3/17, 3/19 ...) and and caspo through 3/26 for 2 week course at nursing home     # Recurrent LLL PNA   - CXR with LLL mucous plug   - POCUS with LLL consolidation vs atelectasis   - Recent BAL with PSA as below   - FULL RVP negative  - SCx with PSA  - Fevers returned and zosyn (3/3 - 3/11)     # Hemoptysis   - Hemoptysis as above   - BAL with PSA as prior   - No fever and monitor off ABX     # Trachitis vs PNA/ UTI   - Hx of steno and PSA in sputum   - Flu/ COVID negative   - MRSA negative   - UCx with enterococcus   - SCx with  PSA   - Found with leukocytosis and started on cefepime and christiano (2/19) and vanco on HD days (2/21).   - No further steno seen in sputum and continue on cefepime (2/19 - 2/25) and vanco on HD days (2/21, 2/22 - 2/25)   - WBC increased likely reactive to hypoxic event and now improving  - ID following     # PPX   - MRSA PCR negative  - Candida auris PCR POSITIVE  - Continue on isolation precautions per IC    HEME  # AOCD   - Presented for angioplasty and found with anemia to 6.7 s/p 1U PRBC 2/19 and 3/14 and 1/2 PRBC 2/23 and monitoring HH   - Anemia panel with concern for AOCD   - No overt signs of bleeding  - Continue on EPO and Fe     # Heparin resistance?   - PTT persistently low despite increasing heparin GTT   - Resume on heparin GTT and anti Xa level supra therapeutic on but PTT remained low  - Patient ultimately with concern for heparin resistance    VASCULAR  # RUE DVT   - RUE edema noted with age-indeterminate, non-occlusive deep vein thrombosis noted in the right brachial vein.   - Case discussed with vascular who recommends full AC   - CTH from prior with encephalomalcia, however no hx of intracranial bleed   - Prior UGIB while on AC, however discharged on eliquis in 7/2024 and completed 6 month course for RLE DVT   - Continue on heparin GTT, however held for hemoptysis and resistance   - Continued on argatroban with good tolerance and then changed to eliquis   - Case discussed vascular and DVT likely chronic and no need for eliquis     ENDOCRINE  # DM2 A1C 14.0 (1/2024) > 6.4 (2/2025)  - Presented with hyperglycemia and continued on lantus and ISS   - Increased lantus, however FS continues to trend in 200s and changed to NPH with improvement.  - TF interrupted for bronchoscopy and adjustment to bolus feeds and FS dropped.   - NPH stopped and FS improved, however trended back up with continuation of tube feeds.   - NPH resumed, however FS continues to wax and wane and case discussed with endocrine and changed to lantus 12 and lispro 6 with ISS  - Monitor FS and adjust as needed  - Endocrine following     ETHICS/ GOC    - Palliative discussion on 3/5  - Remains FULL CODE     DISPO - Back to NH today

## 2025-03-19 NOTE — DISCHARGE NOTE PROVIDER - CARE PROVIDER_API CALL
Rest of care per nursing home medical team.,   Phone: (   )    -  Fax: (   )    -  Follow Up Time:

## 2025-03-19 NOTE — PROGRESS NOTE ADULT - NS_MD_PANP_GEN_ALL_CORE

## 2025-03-19 NOTE — PROGRESS NOTE ADULT - PROVIDER SPECIALTY LIST ADULT
Endocrinology
Endocrinology
Infectious Disease
Nephrology
Pulmonology
Vascular Surgery
Vascular Surgery
Endocrinology
Endocrinology
Infectious Disease
Nephrology
Pulmonology
Vascular Surgery
Infectious Disease
Nephrology
Pulmonology
Vascular Surgery
Vascular Surgery
Endocrinology
Endocrinology
Pulmonology
Nephrology
Endocrinology

## 2025-03-19 NOTE — PROGRESS NOTE ADULT - SUBJECTIVE AND OBJECTIVE BOX
Chief Complaint: DM type 2     Interval Events: wife at the bedside. FS stable. Tube feeding ongoing. Yesterday pt HR 50-60 in evening- pt was on labetalol- primary team sent a TSH and it was 6---added on free T4- result still pending.     MEDICATIONS  (STANDING):  albuterol    0.083% 2.5 milliGRAM(s) Nebulizer every 6 hours  amLODIPine   Tablet 10 milliGRAM(s) Oral daily  AQUAPHOR (petrolatum Ointment) 1 Application(s) Topical two times a day  atorvastatin 20 milliGRAM(s) Oral at bedtime  caspofungin IVPB 50 milliGRAM(s) IV Intermittent every 24 hours  caspofungin IVPB      chlorhexidine 0.12% Liquid 15 milliLiter(s) Oral Mucosa every 12 hours  chlorhexidine 2% Cloths 1 Application(s) Topical daily  dextrose 5%. 1000 milliLiter(s) (100 mL/Hr) IV Continuous <Continuous>  dextrose 5%. 1000 milliLiter(s) (50 mL/Hr) IV Continuous <Continuous>  dextrose 50% Injectable 25 Gram(s) IV Push once  dextrose 50% Injectable 12.5 Gram(s) IV Push once  dextrose 50% Injectable 25 Gram(s) IV Push once  epoetin reilly-epbx (RETACRIT) Injectable 79078 Unit(s) IV Push <User Schedule>  ferrous    sulfate Liquid 300 milliGRAM(s) Enteral Tube daily  glucagon  Injectable 1 milliGRAM(s) IntraMuscular once  heparin   Injectable 5000 Unit(s) SubCutaneous every 12 hours  hydrALAZINE 75 milliGRAM(s) Oral three times a day  insulin glargine Injectable (LANTUS) 12 Unit(s) SubCutaneous at bedtime  insulin lispro (ADMELOG) corrective regimen sliding scale   SubCutaneous every 6 hours  insulin lispro Injectable (ADMELOG) 6 Unit(s) SubCutaneous every 6 hours  Nephro-noam 1 Tablet(s) Oral daily  pantoprazole   Suspension 40 milliGRAM(s) Oral before breakfast  sodium chloride 0.9% for Nebulization 3 milliLiter(s) Nebulizer every 6 hours  vancomycin  IVPB 500 milliGRAM(s) IV Intermittent once    MEDICATIONS  (PRN):  dextrose Oral Gel 15 Gram(s) Oral once PRN Blood Glucose LESS THAN 70 milliGRAM(s)/deciliter  sodium chloride 0.9% Bolus. 100 milliLiter(s) IV Bolus every 5 minutes PRN SBP LESS THAN or EQUAL to 80 mmHg      Allergies    No Known Allergies    Intolerances      Review of Systems:    UNABLE TO OBTAIN    PHYSICAL EXAM:  VITALS: T(C): 36.1 (03-19-25 @ 10:10)  T(F): 97 (03-19-25 @ 10:10), Max: 97.8 (03-19-25 @ 06:50)  HR: 79 (03-19-25 @ 11:34) (59 - 79)  BP: 138/64 (03-19-25 @ 10:10) (127/65 - 154/60)  RR:  (15 - 17)  SpO2:  (100% - 100%)  Wt(kg): --  GENERAL: NAD  RESPIRATORY: trache to vent   GI: Soft, non distended  NEURO: A&Ox0       CAPILLARY BLOOD GLUCOSE      POCT Blood Glucose.: 129 mg/dL (19 Mar 2025 11:21)  POCT Blood Glucose.: 141 mg/dL (19 Mar 2025 06:15)  POCT Blood Glucose.: 166 mg/dL (18 Mar 2025 23:08)  POCT Blood Glucose.: 160 mg/dL (18 Mar 2025 17:32)      03-19    129[L]  |  90[L]  |  55[H]  ----------------------------<  130[H]  3.7   |  24  |  3.11[H]    eGFR: 20[L]    Ca    9.6      03-19  Mg     2.50     03-19  Phos  3.1     03-19            Thyroid Function Tests:  03-19 @ 06:59 TSH 6.52 FreeT4 -- T3 -- Anti TPO -- Anti Thyroglobulin Ab -- TSI --        A1C with Estimated Average Glucose Result: 6.4 % (02-19-25 @ 06:45)  A1C with Estimated Average Glucose Result: 4.8 % (10-05-24 @ 08:37)  A1C with Estimated Average Glucose Result: 5.9 % (07-16-24 @ 10:25)  A1C with Estimated Average Glucose Result: 6.9 % (04-30-24 @ 06:03)          Diet, NPO with Tube Feed:   Tube Feeding Modality: Gastrostomy  Nepro with Carb Steady (NEPRORTH)  Total Volume for 24 Hours (mL): 1160  Bolus  Total Volume of Bolus (mL):  290  Total # of Feeds: 4  Tube Feed Frequency: Every 6 hours   Tube Feed Start Time: 12:00  Bolus Feed Rate (mL per Hour): 290   Bolus Feed Duration (in Hours): 0  Liquid Protein Supplement     Qty per Day:  2 (03-14-25 @ 14:18)

## 2025-03-26 LAB
CULTURE RESULTS: SIGNIFICANT CHANGE UP
SPECIMEN SOURCE: SIGNIFICANT CHANGE UP

## 2025-04-03 ENCOUNTER — TRANSCRIPTION ENCOUNTER (OUTPATIENT)
Age: 75
End: 2025-04-03

## 2025-04-08 ENCOUNTER — TRANSCRIPTION ENCOUNTER (OUTPATIENT)
Age: 75
End: 2025-04-08

## 2025-05-13 ENCOUNTER — NON-APPOINTMENT (OUTPATIENT)
Age: 75
End: 2025-05-13

## 2025-05-16 ENCOUNTER — TRANSCRIPTION ENCOUNTER (OUTPATIENT)
Age: 75
End: 2025-05-16

## 2025-05-23 ENCOUNTER — NON-APPOINTMENT (OUTPATIENT)
Age: 75
End: 2025-05-23

## 2025-06-11 NOTE — ED ADULT NURSE NOTE - PRIMARY CARE PROVIDER
Changed order for triple AAA screening to US abdominal aorta.     Татьяна Middleton MD    [facility]  06/11/2025      
Dr Dominguez

## 2025-06-19 NOTE — ED ADULT NURSE NOTE - NS ED NURSE LEVEL OF CONSCIOUSNESS AFFECT
Problem: PAIN - ADULT  Goal: Verbalizes/displays adequate comfort level or baseline comfort level  Description: Interventions:  - Encourage patient to monitor pain and request assistance  - Assess pain using appropriate pain scale  - Administer analgesics as ordered based on type and severity of pain and evaluate response  - Implement non-pharmacological measures as appropriate and evaluate response  - Consider cultural and social influences on pain and pain management  - Notify physician/advanced practitioner if interventions unsuccessful or patient reports new pain  - Educate patient/family on pain management process including their role and importance of  reporting pain   - Provide non-pharmacologic/complimentary pain relief interventions  Outcome: Progressing     Problem: SAFETY ADULT  Goal: Patient will remain free of falls  Description: INTERVENTIONS:  - Educate patient/family on patient safety including physical limitations  - Instruct patient to call for assistance with activity   - Consider consulting OT/PT to assist with strengthening/mobility based on AM PAC & JH-HLM score  - Consult OT/PT to assist with strengthening/mobility   - Keep Call bell within reach  - Keep bed low and locked with side rails adjusted as appropriate  - Keep care items and personal belongings within reach  - Initiate and maintain comfort rounds  - Make Fall Risk Sign visible to staff  - Offer Toileting every 2 Hours, in advance of need  - Initiate/Maintain bed/chair alarm  - Obtain necessary fall risk management equipment  - Apply yellow socks and bracelet for high fall risk patients  - Consider moving patient to room near nurses station  Outcome: Progressing  Goal: Maintains/Returns to pre admission functional level  Description: INTERVENTIONS:  - Perform AM-PAC 6 Click Basic Mobility/ Daily Activity assessment daily.  - Set and communicate daily mobility goal to care team and patient/family/caregiver.   - Collaborate with  rehabilitation services on mobility goals if consulted  - Perform Range of Motion 3 times a day.  - Reposition patient every 3 hours.  - Dangle patient 3 times a day  - Stand patient 3 times a day  - Ambulate patient 3 times a day  - Out of bed to chair 3 times a day   - Out of bed for meals 3 times a day  - Out of bed for toileting  - Record patient progress and toleration of activity level   Outcome: Progressing     Problem: GASTROINTESTINAL - ADULT  Goal: Minimal or absence of nausea and/or vomiting  Description: INTERVENTIONS:  - Administer IV fluids if ordered to ensure adequate hydration  - Maintain NPO status until nausea and vomiting are resolved  - Nasogastric tube if ordered  - Administer ordered antiemetic medications as needed  - Provide nonpharmacologic comfort measures as appropriate  - Advance diet as tolerated, if ordered  - Consider nutrition services referral to assist patient with adequate nutrition and appropriate food choices  Outcome: Progressing  Goal: Maintains or returns to baseline bowel function  Description: INTERVENTIONS:  - Assess bowel function  - Encourage oral fluids to ensure adequate hydration  - Administer IV fluids if ordered to ensure adequate hydration  - Administer ordered medications as needed  - Encourage mobilization and activity  - Consider nutritional services referral to assist patient with adequate nutrition and appropriate food choices  Outcome: Progressing     Problem: SKIN/TISSUE INTEGRITY - ADULT  Goal: Incision(s), wounds(s) or drain site(s) healing without S/S of infection  Description: INTERVENTIONS  - Assess and document dressing, incision, wound bed, drain sites and surrounding tissue  - Provide patient and family education  - Perform skin care/dressing changes  Outcome: Progressing     Problem: COPING  Goal: Pt/Family able to verbalize concerns and demonstrate effective coping strategies  Description: INTERVENTIONS:  - Assist patient/family to identify coping  skills, available support systems and cultural and spiritual values  - Provide emotional support, including active listening and acknowledgement of concerns of patient and caregivers  - Reduce environmental stimuli, as able  - Provide patient education  - Assess for spiritual pain/suffering and initiate spiritual care, including notification of Pastoral Care or ilan based community as needed  - Assess effectiveness of coping strategies  Outcome: Progressing      Calm

## 2025-07-16 ENCOUNTER — EMERGENCY (EMERGENCY)
Facility: HOSPITAL | Age: 75
LOS: 1 days | End: 2025-07-16
Attending: STUDENT IN AN ORGANIZED HEALTH CARE EDUCATION/TRAINING PROGRAM | Admitting: STUDENT IN AN ORGANIZED HEALTH CARE EDUCATION/TRAINING PROGRAM
Payer: MEDICARE

## 2025-07-16 VITALS
RESPIRATION RATE: 14 BRPM | TEMPERATURE: 98 F | OXYGEN SATURATION: 99 % | DIASTOLIC BLOOD PRESSURE: 72 MMHG | SYSTOLIC BLOOD PRESSURE: 145 MMHG | HEART RATE: 71 BPM

## 2025-07-16 DIAGNOSIS — I77.0 ARTERIOVENOUS FISTULA, ACQUIRED: Chronic | ICD-10-CM

## 2025-07-16 DIAGNOSIS — Z98.890 OTHER SPECIFIED POSTPROCEDURAL STATES: Chronic | ICD-10-CM

## 2025-07-16 DIAGNOSIS — Z93.1 GASTROSTOMY STATUS: Chronic | ICD-10-CM

## 2025-07-16 LAB
ALBUMIN SERPL ELPH-MCNC: 3.2 G/DL — LOW (ref 3.3–5)
ALP SERPL-CCNC: 226 U/L — HIGH (ref 40–120)
ALT FLD-CCNC: 13 U/L — SIGNIFICANT CHANGE UP (ref 4–41)
ANION GAP SERPL CALC-SCNC: 15 MMOL/L — HIGH (ref 7–14)
AST SERPL-CCNC: 25 U/L — SIGNIFICANT CHANGE UP (ref 4–40)
BILIRUB SERPL-MCNC: 0.4 MG/DL — SIGNIFICANT CHANGE UP (ref 0.2–1.2)
BUN SERPL-MCNC: 109 MG/DL — HIGH (ref 7–23)
CALCIUM SERPL-MCNC: 10.1 MG/DL — SIGNIFICANT CHANGE UP (ref 8.4–10.5)
CHLORIDE SERPL-SCNC: 87 MMOL/L — LOW (ref 98–107)
CO2 SERPL-SCNC: 26 MMOL/L — SIGNIFICANT CHANGE UP (ref 22–31)
CREAT SERPL-MCNC: 2.96 MG/DL — HIGH (ref 0.5–1.3)
EGFR: 22 ML/MIN/1.73M2 — LOW
EGFR: 22 ML/MIN/1.73M2 — LOW
GLUCOSE SERPL-MCNC: 186 MG/DL — HIGH (ref 70–99)
POTASSIUM SERPL-MCNC: 4.6 MMOL/L — SIGNIFICANT CHANGE UP (ref 3.5–5.3)
POTASSIUM SERPL-SCNC: 4.6 MMOL/L — SIGNIFICANT CHANGE UP (ref 3.5–5.3)
PROT SERPL-MCNC: 7.2 G/DL — SIGNIFICANT CHANGE UP (ref 6–8.3)
SODIUM SERPL-SCNC: 128 MMOL/L — LOW (ref 135–145)

## 2025-07-16 PROCEDURE — 99291 CRITICAL CARE FIRST HOUR: CPT | Mod: FS,GC

## 2025-07-17 VITALS
TEMPERATURE: 98 F | OXYGEN SATURATION: 100 % | SYSTOLIC BLOOD PRESSURE: 174 MMHG | RESPIRATION RATE: 16 BRPM | DIASTOLIC BLOOD PRESSURE: 62 MMHG | HEART RATE: 83 BPM

## 2025-07-17 PROBLEM — L89.90 PRESSURE ULCER OF UNSPECIFIED SITE, UNSPECIFIED STAGE: Chronic | Status: ACTIVE | Noted: 2025-02-18

## 2025-07-17 PROBLEM — Z93.0 TRACHEOSTOMY STATUS: Chronic | Status: ACTIVE | Noted: 2025-02-18

## 2025-07-17 PROBLEM — E78.5 HYPERLIPIDEMIA, UNSPECIFIED: Chronic | Status: ACTIVE | Noted: 2025-02-18

## 2025-07-17 PROBLEM — K59.00 CONSTIPATION, UNSPECIFIED: Chronic | Status: ACTIVE | Noted: 2025-02-18

## 2025-07-17 PROBLEM — Z93.1 GASTROSTOMY STATUS: Chronic | Status: ACTIVE | Noted: 2025-02-18

## 2025-07-17 PROBLEM — R47.01 APHASIA: Chronic | Status: ACTIVE | Noted: 2025-02-18

## 2025-07-17 PROBLEM — I63.9 CEREBRAL INFARCTION, UNSPECIFIED: Chronic | Status: ACTIVE | Noted: 2025-02-18

## 2025-07-17 PROBLEM — K21.9 GASTRO-ESOPHAGEAL REFLUX DISEASE WITHOUT ESOPHAGITIS: Chronic | Status: ACTIVE | Noted: 2025-02-18

## 2025-07-17 LAB
BASOPHILS # BLD AUTO: 0.05 K/UL — SIGNIFICANT CHANGE UP (ref 0–0.2)
BASOPHILS # BLD AUTO: 0.06 K/UL — SIGNIFICANT CHANGE UP (ref 0–0.2)
BASOPHILS NFR BLD AUTO: 0.4 % — SIGNIFICANT CHANGE UP (ref 0–2)
BASOPHILS NFR BLD AUTO: 0.5 % — SIGNIFICANT CHANGE UP (ref 0–2)
BLD GP AB SCN SERPL QL: NEGATIVE — SIGNIFICANT CHANGE UP
EOSINOPHIL # BLD AUTO: 0.93 K/UL — HIGH (ref 0–0.5)
EOSINOPHIL # BLD AUTO: 0.93 K/UL — HIGH (ref 0–0.5)
EOSINOPHIL NFR BLD AUTO: 7 % — HIGH (ref 0–6)
EOSINOPHIL NFR BLD AUTO: 7.2 % — HIGH (ref 0–6)
HCT VFR BLD CALC: 22.7 % — LOW (ref 39–50)
HCT VFR BLD CALC: 26.7 % — LOW (ref 39–50)
HGB BLD-MCNC: 6.7 G/DL — CRITICAL LOW (ref 13–17)
HGB BLD-MCNC: 8.4 G/DL — LOW (ref 13–17)
IMM GRANULOCYTES # BLD AUTO: 0.28 K/UL — HIGH (ref 0–0.07)
IMM GRANULOCYTES # BLD AUTO: 0.29 K/UL — HIGH (ref 0–0.07)
IMM GRANULOCYTES NFR BLD AUTO: 2.1 % — HIGH (ref 0–0.9)
IMM GRANULOCYTES NFR BLD AUTO: 2.2 % — HIGH (ref 0–0.9)
LYMPHOCYTES # BLD AUTO: 0.96 K/UL — LOW (ref 1–3.3)
LYMPHOCYTES # BLD AUTO: 0.97 K/UL — LOW (ref 1–3.3)
LYMPHOCYTES NFR BLD AUTO: 7.2 % — LOW (ref 13–44)
LYMPHOCYTES NFR BLD AUTO: 7.5 % — LOW (ref 13–44)
MCHC RBC-ENTMCNC: 22.6 PG — LOW (ref 27–34)
MCHC RBC-ENTMCNC: 24.1 PG — LOW (ref 27–34)
MCHC RBC-ENTMCNC: 29.5 G/DL — LOW (ref 32–36)
MCHC RBC-ENTMCNC: 31.5 G/DL — LOW (ref 32–36)
MCV RBC AUTO: 76.7 FL — LOW (ref 80–100)
MCV RBC AUTO: 76.7 FL — LOW (ref 80–100)
MONOCYTES # BLD AUTO: 0.85 K/UL — SIGNIFICANT CHANGE UP (ref 0–0.9)
MONOCYTES # BLD AUTO: 0.89 K/UL — SIGNIFICANT CHANGE UP (ref 0–0.9)
MONOCYTES NFR BLD AUTO: 6.4 % — SIGNIFICANT CHANGE UP (ref 2–14)
MONOCYTES NFR BLD AUTO: 6.9 % — SIGNIFICANT CHANGE UP (ref 2–14)
NEUTROPHILS # BLD AUTO: 10.17 K/UL — HIGH (ref 1.8–7.4)
NEUTROPHILS # BLD AUTO: 9.76 K/UL — HIGH (ref 1.8–7.4)
NEUTROPHILS NFR BLD AUTO: 75.8 % — SIGNIFICANT CHANGE UP (ref 43–77)
NEUTROPHILS NFR BLD AUTO: 76.8 % — SIGNIFICANT CHANGE UP (ref 43–77)
NRBC # BLD AUTO: 0.11 K/UL — HIGH (ref 0–0)
NRBC # BLD AUTO: 0.12 K/UL — HIGH (ref 0–0)
NRBC # FLD: 0.11 K/UL — HIGH (ref 0–0)
NRBC # FLD: 0.12 K/UL — HIGH (ref 0–0)
NRBC BLD AUTO-RTO: 0 /100 WBCS — SIGNIFICANT CHANGE UP (ref 0–0)
NRBC BLD AUTO-RTO: 0 /100 WBCS — SIGNIFICANT CHANGE UP (ref 0–0)
PLATELET # BLD AUTO: 282 K/UL — SIGNIFICANT CHANGE UP (ref 150–400)
PLATELET # BLD AUTO: 284 K/UL — SIGNIFICANT CHANGE UP (ref 150–400)
PMV BLD: 11.6 FL — SIGNIFICANT CHANGE UP (ref 7–13)
PMV BLD: 11.9 FL — SIGNIFICANT CHANGE UP (ref 7–13)
RBC # BLD: 2.96 M/UL — LOW (ref 4.2–5.8)
RBC # BLD: 3.48 M/UL — LOW (ref 4.2–5.8)
RBC # FLD: 15.9 % — HIGH (ref 10.3–14.5)
RBC # FLD: 16.1 % — HIGH (ref 10.3–14.5)
RH IG SCN BLD-IMP: POSITIVE — SIGNIFICANT CHANGE UP
WBC # BLD: 12.89 K/UL — HIGH (ref 3.8–10.5)
WBC # BLD: 13.25 K/UL — HIGH (ref 3.8–10.5)
WBC # FLD AUTO: 12.89 K/UL — HIGH (ref 3.8–10.5)
WBC # FLD AUTO: 13.25 K/UL — HIGH (ref 3.8–10.5)

## (undated) DEVICE — POSITIONER STRAP ARMBOARD VELCRO TS-30

## (undated) DEVICE — SOL IRR BAG NS 0.9% 1000ML

## (undated) DEVICE — DRAPE HAND 77" X 146"

## (undated) DEVICE — DURABLE MEDICAL EQUIPMENT: Type: DURABLE MEDICAL EQUIPMENT

## (undated) DEVICE — BAG DECANTER DISP

## (undated) DEVICE — DRAPE TOWEL BLUE 17" X 24"

## (undated) DEVICE — SUT VICRYL 3-0 27" SH UNDYED

## (undated) DEVICE — SUT SILK 3-0 18" TIES

## (undated) DEVICE — ELCTR GROUNDING PAD ADULT COVIDIEN

## (undated) DEVICE — SYR LUER LOK 10CC

## (undated) DEVICE — GEL AQUSNC PACKET 20GR

## (undated) DEVICE — SOL IRR POUR H2O 500ML

## (undated) DEVICE — DRAPE MAYO STAND 23"

## (undated) DEVICE — SUT PROLENE 7-0 24" BV-1

## (undated) DEVICE — DRSG TEGADERM 2.5X3"

## (undated) DEVICE — VESSEL LOOP MINI-BLUE 0.075" X 16"

## (undated) DEVICE — VENODYNE/SCD SLEEVE CALF MEDIUM

## (undated) DEVICE — PACK AV FISTULA

## (undated) DEVICE — CLAMP BULLDOG MIDI 45 DEGREE (GREEN) DISP

## (undated) DEVICE — SUT MONOCRYL 4-0 27" PS-2 UNDYED

## (undated) DEVICE — INFLATOR ENCORE 26

## (undated) DEVICE — SYNOVIS VASCULAR PROBE 1.5MM 15CM

## (undated) DEVICE — FRA-ESU BOVIE FORCE TRIAD T7J19717DX: Type: DURABLE MEDICAL EQUIPMENT

## (undated) DEVICE — DRSG CURITY GAUZE SPONGE 4 X 4" 12-PLY

## (undated) DEVICE — SYR LUER SLIP TIP 30CC

## (undated) DEVICE — NDL HYPO SAFE 25G X 5/8" (ORANGE)

## (undated) DEVICE — WARMING BLANKET LOWER ADULT

## (undated) DEVICE — GOWN LG

## (undated) DEVICE — SUT PROLENE 6-0 24" BV-1

## (undated) DEVICE — DRAPE 3/4 SHEET 52X76"

## (undated) DEVICE — PREP CHLORAPREP HI-LITE ORANGE 26ML

## (undated) DEVICE — SUT SILK 2-0 18" TIES

## (undated) DEVICE — SOL BAG NS 0.9% 1000ML

## (undated) DEVICE — LUBRICATING JELLY ONESHOT 1.25OZ

## (undated) DEVICE — SUT SILK 4-0 17-18"

## (undated) DEVICE — TORQUE DEVICE FOR GUIDEWIRE 0.0100.038"

## (undated) DEVICE — TUBING SUCTION NONCONDUCTIVE 6MM X 12FT